# Patient Record
Sex: MALE | Race: BLACK OR AFRICAN AMERICAN | NOT HISPANIC OR LATINO | Employment: OTHER | URBAN - METROPOLITAN AREA
[De-identification: names, ages, dates, MRNs, and addresses within clinical notes are randomized per-mention and may not be internally consistent; named-entity substitution may affect disease eponyms.]

---

## 2017-01-06 ENCOUNTER — ANTI-COAG VISIT (OUTPATIENT)
Dept: CARDIOLOGY | Facility: CLINIC | Age: 62
End: 2017-01-06
Payer: COMMERCIAL

## 2017-01-06 DIAGNOSIS — I48.91 ATRIAL FIBRILLATION WITH RAPID VENTRICULAR RESPONSE: ICD-10-CM

## 2017-01-06 DIAGNOSIS — Z79.01 LONG TERM (CURRENT) USE OF ANTICOAGULANTS: ICD-10-CM

## 2017-01-06 LAB
CTP QC/QA: NORMAL
INR PPP: 2.3 (ref 2–3)

## 2017-01-06 PROCEDURE — 85610 PROTHROMBIN TIME: CPT | Mod: QW,S$GLB,,

## 2017-01-22 PROBLEM — R79.89 ELEVATED TROPONIN: Status: ACTIVE | Noted: 2017-01-22

## 2017-01-23 ENCOUNTER — OFFICE VISIT (OUTPATIENT)
Dept: INTERNAL MEDICINE | Facility: CLINIC | Age: 62
DRG: 872 | End: 2017-01-23
Payer: COMMERCIAL

## 2017-01-23 ENCOUNTER — HOSPITAL ENCOUNTER (INPATIENT)
Facility: HOSPITAL | Age: 62
LOS: 4 days | Discharge: HOME | DRG: 872 | End: 2017-01-27
Attending: EMERGENCY MEDICINE | Admitting: INTERNAL MEDICINE
Payer: COMMERCIAL

## 2017-01-23 VITALS
HEIGHT: 73 IN | WEIGHT: 250.44 LBS | DIASTOLIC BLOOD PRESSURE: 68 MMHG | HEART RATE: 98 BPM | SYSTOLIC BLOOD PRESSURE: 146 MMHG | OXYGEN SATURATION: 91 % | BODY MASS INDEX: 33.19 KG/M2

## 2017-01-23 DIAGNOSIS — I30.9 ACUTE PERICARDITIS: ICD-10-CM

## 2017-01-23 DIAGNOSIS — M54.6 THORACIC BACK PAIN, UNSPECIFIED BACK PAIN LATERALITY, UNSPECIFIED CHRONICITY: ICD-10-CM

## 2017-01-23 DIAGNOSIS — Z86.79 HISTORY OF PERICARDITIS: ICD-10-CM

## 2017-01-23 DIAGNOSIS — R06.02 SOB (SHORTNESS OF BREATH): ICD-10-CM

## 2017-01-23 DIAGNOSIS — J98.11 MILD BIBASILAR ATELECTASIS: ICD-10-CM

## 2017-01-23 DIAGNOSIS — D72.829 LEUKOCYTOSIS, UNSPECIFIED TYPE: ICD-10-CM

## 2017-01-23 DIAGNOSIS — R79.89 ELEVATED TROPONIN: ICD-10-CM

## 2017-01-23 DIAGNOSIS — R06.02 SHORTNESS OF BREATH: Primary | ICD-10-CM

## 2017-01-23 DIAGNOSIS — M89.8X9 BONE PAIN: ICD-10-CM

## 2017-01-23 DIAGNOSIS — N18.30 CKD (CHRONIC KIDNEY DISEASE) STAGE 3, GFR 30-59 ML/MIN: ICD-10-CM

## 2017-01-23 DIAGNOSIS — Z94.0 S/P KIDNEY TRANSPLANT: ICD-10-CM

## 2017-01-23 DIAGNOSIS — R07.9 CHEST PAIN, UNSPECIFIED TYPE: Primary | ICD-10-CM

## 2017-01-23 DIAGNOSIS — R07.9 CHEST PAIN: ICD-10-CM

## 2017-01-23 PROBLEM — A41.9 SEPSIS: Status: ACTIVE | Noted: 2017-01-23

## 2017-01-23 LAB
ALBUMIN SERPL BCP-MCNC: 3.4 G/DL
ALP SERPL-CCNC: 76 U/L
ALT SERPL W/O P-5'-P-CCNC: 16 U/L
ANION GAP SERPL CALC-SCNC: 12 MMOL/L
AST SERPL-CCNC: 18 U/L
BASOPHILS # BLD AUTO: 0.01 K/UL
BASOPHILS NFR BLD: 0.1 %
BILIRUB SERPL-MCNC: 0.6 MG/DL
BNP SERPL-MCNC: 126 PG/ML
BUN SERPL-MCNC: 16 MG/DL
CALCIUM SERPL-MCNC: 7.5 MG/DL
CHLORIDE SERPL-SCNC: 104 MMOL/L
CK SERPL-CCNC: 227 U/L
CO2 SERPL-SCNC: 21 MMOL/L
CREAT SERPL-MCNC: 1.8 MG/DL
CRP SERPL-MCNC: 171.4 MG/L
DIFFERENTIAL METHOD: ABNORMAL
EOSINOPHIL # BLD AUTO: 0 K/UL
EOSINOPHIL NFR BLD: 0.2 %
ERYTHROCYTE [DISTWIDTH] IN BLOOD BY AUTOMATED COUNT: 14 %
ERYTHROCYTE [SEDIMENTATION RATE] IN BLOOD BY WESTERGREN METHOD: 68 MM/HR
EST. GFR  (AFRICAN AMERICAN): 45.6 ML/MIN/1.73 M^2
EST. GFR  (NON AFRICAN AMERICAN): 39.5 ML/MIN/1.73 M^2
GLUCOSE SERPL-MCNC: 115 MG/DL
HCT VFR BLD AUTO: 38.3 %
HGB BLD-MCNC: 12.5 G/DL
INR PPP: 2.9
LYMPHOCYTES # BLD AUTO: 1.2 K/UL
LYMPHOCYTES NFR BLD: 9.3 %
MCH RBC QN AUTO: 26.4 PG
MCHC RBC AUTO-ENTMCNC: 32.6 %
MCV RBC AUTO: 81 FL
MONOCYTES # BLD AUTO: 1 K/UL
MONOCYTES NFR BLD: 7.4 %
NEUTROPHILS # BLD AUTO: 10.6 K/UL
NEUTROPHILS NFR BLD: 82.5 %
PLATELET # BLD AUTO: 159 K/UL
PMV BLD AUTO: 12 FL
POTASSIUM SERPL-SCNC: 4.2 MMOL/L
PROT SERPL-MCNC: 7.5 G/DL
PROTHROMBIN TIME: 29.3 SEC
RBC # BLD AUTO: 4.74 M/UL
SODIUM SERPL-SCNC: 137 MMOL/L
TROPONIN I SERPL DL<=0.01 NG/ML-MCNC: 0.03 NG/ML
TROPONIN I SERPL DL<=0.01 NG/ML-MCNC: 0.05 NG/ML
WBC # BLD AUTO: 12.8 K/UL

## 2017-01-23 PROCEDURE — 1159F MED LIST DOCD IN RCRD: CPT | Mod: S$GLB,,, | Performed by: INTERNAL MEDICINE

## 2017-01-23 PROCEDURE — 80053 COMPREHEN METABOLIC PANEL: CPT

## 2017-01-23 PROCEDURE — 85025 COMPLETE CBC W/AUTO DIFF WBC: CPT

## 2017-01-23 PROCEDURE — 99999 PR PBB SHADOW E&M-EST. PATIENT-LVL IV: CPT | Mod: PBBFAC,,, | Performed by: INTERNAL MEDICINE

## 2017-01-23 PROCEDURE — 87400 INFLUENZA A/B EACH AG IA: CPT | Mod: 59

## 2017-01-23 PROCEDURE — 99285 EMERGENCY DEPT VISIT HI MDM: CPT | Mod: ,,, | Performed by: EMERGENCY MEDICINE

## 2017-01-23 PROCEDURE — 63600175 PHARM REV CODE 636 W HCPCS: Performed by: STUDENT IN AN ORGANIZED HEALTH CARE EDUCATION/TRAINING PROGRAM

## 2017-01-23 PROCEDURE — 94760 N-INVAS EAR/PLS OXIMETRY 1: CPT

## 2017-01-23 PROCEDURE — 96365 THER/PROPH/DIAG IV INF INIT: CPT

## 2017-01-23 PROCEDURE — 3078F DIAST BP <80 MM HG: CPT | Mod: S$GLB,,, | Performed by: INTERNAL MEDICINE

## 2017-01-23 PROCEDURE — 99285 EMERGENCY DEPT VISIT HI MDM: CPT | Mod: 25

## 2017-01-23 PROCEDURE — 87040 BLOOD CULTURE FOR BACTERIA: CPT

## 2017-01-23 PROCEDURE — 25000003 PHARM REV CODE 250: Performed by: STUDENT IN AN ORGANIZED HEALTH CARE EDUCATION/TRAINING PROGRAM

## 2017-01-23 PROCEDURE — 96375 TX/PRO/DX INJ NEW DRUG ADDON: CPT

## 2017-01-23 PROCEDURE — 93005 ELECTROCARDIOGRAM TRACING: CPT

## 2017-01-23 PROCEDURE — 96361 HYDRATE IV INFUSION ADD-ON: CPT

## 2017-01-23 PROCEDURE — 25000242 PHARM REV CODE 250 ALT 637 W/ HCPCS: Performed by: NURSE PRACTITIONER

## 2017-01-23 PROCEDURE — 99215 OFFICE O/P EST HI 40 MIN: CPT | Mod: S$GLB,,, | Performed by: INTERNAL MEDICINE

## 2017-01-23 PROCEDURE — 12000002 HC ACUTE/MED SURGE SEMI-PRIVATE ROOM

## 2017-01-23 PROCEDURE — 82550 ASSAY OF CK (CPK): CPT

## 2017-01-23 PROCEDURE — 84484 ASSAY OF TROPONIN QUANT: CPT | Mod: 91

## 2017-01-23 PROCEDURE — 83880 ASSAY OF NATRIURETIC PEPTIDE: CPT

## 2017-01-23 PROCEDURE — 25500020 PHARM REV CODE 255: Performed by: EMERGENCY MEDICINE

## 2017-01-23 PROCEDURE — 94640 AIRWAY INHALATION TREATMENT: CPT

## 2017-01-23 PROCEDURE — 96366 THER/PROPH/DIAG IV INF ADDON: CPT | Mod: 59

## 2017-01-23 PROCEDURE — 86140 C-REACTIVE PROTEIN: CPT

## 2017-01-23 PROCEDURE — 27000221 HC OXYGEN, UP TO 24 HOURS

## 2017-01-23 PROCEDURE — 93010 ELECTROCARDIOGRAM REPORT: CPT | Mod: ,,, | Performed by: INTERNAL MEDICINE

## 2017-01-23 PROCEDURE — 25000003 PHARM REV CODE 250: Performed by: EMERGENCY MEDICINE

## 2017-01-23 PROCEDURE — 96376 TX/PRO/DX INJ SAME DRUG ADON: CPT

## 2017-01-23 PROCEDURE — 80197 ASSAY OF TACROLIMUS: CPT

## 2017-01-23 PROCEDURE — 3074F SYST BP LT 130 MM HG: CPT | Mod: S$GLB,,, | Performed by: INTERNAL MEDICINE

## 2017-01-23 PROCEDURE — 85651 RBC SED RATE NONAUTOMATED: CPT

## 2017-01-23 PROCEDURE — 85610 PROTHROMBIN TIME: CPT

## 2017-01-23 PROCEDURE — 63600175 PHARM REV CODE 636 W HCPCS: Performed by: EMERGENCY MEDICINE

## 2017-01-23 RX ORDER — PANTOPRAZOLE SODIUM 40 MG/1
40 TABLET, DELAYED RELEASE ORAL DAILY
Status: DISCONTINUED | OUTPATIENT
Start: 2017-01-24 | End: 2017-01-27 | Stop reason: HOSPADM

## 2017-01-23 RX ORDER — IBUPROFEN 200 MG
16 TABLET ORAL
Status: DISCONTINUED | OUTPATIENT
Start: 2017-01-23 | End: 2017-01-27 | Stop reason: HOSPADM

## 2017-01-23 RX ORDER — ASPIRIN 325 MG
325 TABLET ORAL ONCE
Status: COMPLETED | OUTPATIENT
Start: 2017-01-23 | End: 2017-01-23

## 2017-01-23 RX ORDER — IBUPROFEN 200 MG
24 TABLET ORAL
Status: DISCONTINUED | OUTPATIENT
Start: 2017-01-23 | End: 2017-01-27 | Stop reason: HOSPADM

## 2017-01-23 RX ORDER — PREDNISONE 5 MG/1
5 TABLET ORAL EVERY MORNING
Status: DISCONTINUED | OUTPATIENT
Start: 2017-01-24 | End: 2017-01-24

## 2017-01-23 RX ORDER — ONDANSETRON 2 MG/ML
4 INJECTION INTRAMUSCULAR; INTRAVENOUS EVERY 12 HOURS PRN
Status: DISCONTINUED | OUTPATIENT
Start: 2017-01-23 | End: 2017-01-27 | Stop reason: HOSPADM

## 2017-01-23 RX ORDER — GLUCAGON 1 MG
1 KIT INJECTION
Status: DISCONTINUED | OUTPATIENT
Start: 2017-01-23 | End: 2017-01-27 | Stop reason: HOSPADM

## 2017-01-23 RX ORDER — TACROLIMUS 1 MG/1
4 CAPSULE ORAL EVERY MORNING
Status: DISCONTINUED | OUTPATIENT
Start: 2017-01-24 | End: 2017-01-27

## 2017-01-23 RX ORDER — MOXIFLOXACIN HYDROCHLORIDE 400 MG/250ML
400 INJECTION, SOLUTION INTRAVENOUS
Status: COMPLETED | OUTPATIENT
Start: 2017-01-23 | End: 2017-01-23

## 2017-01-23 RX ORDER — RAMELTEON 8 MG/1
8 TABLET ORAL NIGHTLY PRN
Status: DISCONTINUED | OUTPATIENT
Start: 2017-01-23 | End: 2017-01-27 | Stop reason: HOSPADM

## 2017-01-23 RX ORDER — METOPROLOL TARTRATE 50 MG/1
50 TABLET ORAL 2 TIMES DAILY
Status: DISCONTINUED | OUTPATIENT
Start: 2017-01-23 | End: 2017-01-27 | Stop reason: HOSPADM

## 2017-01-23 RX ORDER — SODIUM CHLORIDE 9 MG/ML
INJECTION, SOLUTION INTRAVENOUS CONTINUOUS
Status: DISCONTINUED | OUTPATIENT
Start: 2017-01-24 | End: 2017-01-23

## 2017-01-23 RX ORDER — ONDANSETRON 8 MG/1
8 TABLET, ORALLY DISINTEGRATING ORAL EVERY 8 HOURS PRN
Status: DISCONTINUED | OUTPATIENT
Start: 2017-01-23 | End: 2017-01-27 | Stop reason: HOSPADM

## 2017-01-23 RX ORDER — MORPHINE SULFATE 2 MG/ML
8 INJECTION, SOLUTION INTRAMUSCULAR; INTRAVENOUS
Status: COMPLETED | OUTPATIENT
Start: 2017-01-23 | End: 2017-01-23

## 2017-01-23 RX ORDER — IBUPROFEN 200 MG
1 TABLET ORAL DAILY
Status: DISCONTINUED | OUTPATIENT
Start: 2017-01-24 | End: 2017-01-27 | Stop reason: HOSPADM

## 2017-01-23 RX ORDER — SODIUM CHLORIDE, SODIUM LACTATE, POTASSIUM CHLORIDE, CALCIUM CHLORIDE 600; 310; 30; 20 MG/100ML; MG/100ML; MG/100ML; MG/100ML
INJECTION, SOLUTION INTRAVENOUS CONTINUOUS
Status: DISCONTINUED | OUTPATIENT
Start: 2017-01-23 | End: 2017-01-24

## 2017-01-23 RX ORDER — IPRATROPIUM BROMIDE AND ALBUTEROL SULFATE 2.5; .5 MG/3ML; MG/3ML
3 SOLUTION RESPIRATORY (INHALATION) ONCE
Status: COMPLETED | OUTPATIENT
Start: 2017-01-23 | End: 2017-01-23

## 2017-01-23 RX ORDER — MYCOPHENOLATE MOFETIL 250 MG/1
250 CAPSULE ORAL 2 TIMES DAILY
Status: DISCONTINUED | OUTPATIENT
Start: 2017-01-23 | End: 2017-01-23

## 2017-01-23 RX ORDER — TACROLIMUS 1 MG/1
3 CAPSULE ORAL EVERY EVENING
Status: DISCONTINUED | OUTPATIENT
Start: 2017-01-23 | End: 2017-01-24

## 2017-01-23 RX ORDER — NIFEDIPINE 30 MG/1
60 TABLET, EXTENDED RELEASE ORAL DAILY
Status: DISCONTINUED | OUTPATIENT
Start: 2017-01-24 | End: 2017-01-27 | Stop reason: HOSPADM

## 2017-01-23 RX ORDER — FUROSEMIDE 20 MG/1
20 TABLET ORAL 2 TIMES DAILY
Status: DISCONTINUED | OUTPATIENT
Start: 2017-01-23 | End: 2017-01-23

## 2017-01-23 RX ORDER — WARFARIN SODIUM 5 MG/1
5 TABLET ORAL DAILY
Status: DISCONTINUED | OUTPATIENT
Start: 2017-01-24 | End: 2017-01-24

## 2017-01-23 RX ORDER — MORPHINE SULFATE 2 MG/ML
4 INJECTION, SOLUTION INTRAMUSCULAR; INTRAVENOUS EVERY 4 HOURS PRN
Status: DISCONTINUED | OUTPATIENT
Start: 2017-01-23 | End: 2017-01-24

## 2017-01-23 RX ORDER — IPRATROPIUM BROMIDE AND ALBUTEROL SULFATE 2.5; .5 MG/3ML; MG/3ML
3 SOLUTION RESPIRATORY (INHALATION) EVERY 4 HOURS PRN
Status: DISCONTINUED | OUTPATIENT
Start: 2017-01-24 | End: 2017-01-27 | Stop reason: HOSPADM

## 2017-01-23 RX ORDER — SPIRONOLACTONE 25 MG/1
25 TABLET ORAL DAILY
Status: DISCONTINUED | OUTPATIENT
Start: 2017-01-24 | End: 2017-01-24

## 2017-01-23 RX ORDER — MORPHINE SULFATE 2 MG/ML
4 INJECTION, SOLUTION INTRAMUSCULAR; INTRAVENOUS
Status: COMPLETED | OUTPATIENT
Start: 2017-01-23 | End: 2017-01-23

## 2017-01-23 RX ORDER — ATORVASTATIN CALCIUM 10 MG/1
10 TABLET, FILM COATED ORAL DAILY
Status: DISCONTINUED | OUTPATIENT
Start: 2017-01-24 | End: 2017-01-24

## 2017-01-23 RX ADMIN — MORPHINE SULFATE 8 MG: 2 INJECTION, SOLUTION INTRAMUSCULAR; INTRAVENOUS at 05:01

## 2017-01-23 RX ADMIN — TACROLIMUS 3 MG: 1 CAPSULE, GELATIN COATED ORAL at 11:01

## 2017-01-23 RX ADMIN — IOHEXOL 100 ML: 350 INJECTION, SOLUTION INTRAVENOUS at 05:01

## 2017-01-23 RX ADMIN — MYCOPHENOLATE MOFETIL 250 MG: 250 CAPSULE ORAL at 10:01

## 2017-01-23 RX ADMIN — ASPIRIN 325 MG ORAL TABLET 325 MG: 325 PILL ORAL at 11:01

## 2017-01-23 RX ADMIN — MORPHINE SULFATE 4 MG: 2 INJECTION, SOLUTION INTRAMUSCULAR; INTRAVENOUS at 03:01

## 2017-01-23 RX ADMIN — SODIUM CHLORIDE, SODIUM LACTATE, POTASSIUM CHLORIDE, AND CALCIUM CHLORIDE 1000 ML: .6; .31; .03; .02 INJECTION, SOLUTION INTRAVENOUS at 05:01

## 2017-01-23 RX ADMIN — METOPROLOL TARTRATE 50 MG: 50 TABLET, FILM COATED ORAL at 08:01

## 2017-01-23 RX ADMIN — SODIUM CHLORIDE, SODIUM LACTATE, POTASSIUM CHLORIDE, AND CALCIUM CHLORIDE: .6; .31; .03; .02 INJECTION, SOLUTION INTRAVENOUS at 08:01

## 2017-01-23 RX ADMIN — IPRATROPIUM BROMIDE AND ALBUTEROL SULFATE 3 ML: .5; 3 SOLUTION RESPIRATORY (INHALATION) at 09:01

## 2017-01-23 RX ADMIN — FUROSEMIDE 20 MG: 20 TABLET ORAL at 08:01

## 2017-01-23 RX ADMIN — MOXIFLOXACIN HYDROCHLORIDE 400 MG: 400 INJECTION, SOLUTION INTRAVENOUS at 08:01

## 2017-01-23 NOTE — IP AVS SNAPSHOT
[facility]  [address]  [facility] [address]  Phone: [phone]           Patient Discharge Instructions     Our goal is to set you up for success. This packet includes information on your condition, medications, and your home care. It will help you to care for yourself so you don't get sicker and need to go back to the hospital.     Please ask your nurse if you have any questions.        There are many details to remember when preparing to leave the hospital. Here is what you will need to do:    1. Take your medicine. If you are prescribed medications, review your Medication List in the following pages. You may have new medications to  at the pharmacy and others that you'll need to stop taking. Review the instructions for how and when to take your medications. Talk with your doctor or nurses if you are unsure of what to do.     2. Go to your follow-up appointments. Specific follow-up information is listed in the following pages. Your may be contacted by a transition nurse or clinical provider about future appointments. Be sure we have all of the phone numbers to reach you, if needed. Please contact your provider's office if you are unable to make an appointment.     3. Watch for warning signs. Your doctor or nurse will give you detailed warning signs to watch for and when to call for assistance. These instructions may also include educational information about your condition. If you experience any of warning signs to your health, call your doctor.               [facility]  Unless otherwise directed by your provider, please contact [facility], our nurse care line that is available for 24/7 assistance.     [phone] (toll-free)    Registered nurses in the [facility] provide clinical advisement, health education, appointment booking, and other advisory services.                    ** Verify the list of medication(s) below is accurate and up  to date. Carry this with you in case of emergency. If your medications have changed, please notify your healthcare provider.             Medication List      START taking these medications        Additional Info    Begin Date AM Noon PM Bedtime    colchicine 0.6 mg tablet   Quantity:  60 tablet   Refills:  2   Dose:  0.6 mg    Last time this was given:  0.6 mg on 1/26/2017  8:50 AM   Instructions:  Take 1 tablet (0.6 mg total) by mouth 2 (two) times daily.                                    CHANGE how you take these medications        Additional Info    Begin Date AM Noon PM Bedtime    atorvastatin 40 MG tablet   Commonly known as:  LIPITOR   Quantity:  90 tablet   Refills:  3   Dose:  40 mg   What changed:    - medication strength  - how much to take    Last time this was given:  40 mg on 1/27/2017  9:01 AM   Instructions:  Take 1 tablet (40 mg total) by mouth once daily.                               * predniSONE 5 MG tablet   Commonly known as:  DELTASONE   Quantity:  90 tablet   Refills:  3   What changed:  Another medication with the same name was added. Make sure you understand how and when to take each.    Last time this was given:  50 mg on 1/27/2017  9:02 AM   Instructions:  TAKE 1 TABLET BY MOUTH EVERY MORNING                               * predniSONE 10 MG tablet   Commonly known as:  DELTASONE   Quantity:  40 tablet   Refills:  0   Dose:  10 mg   What changed:  You were already taking a medication with the same name, and this prescription was added. Make sure you understand how and when to take each.    Last time this was given:  50 mg on 1/27/2017  9:02 AM   Instructions:  Take 1 tablet (10 mg total) by mouth once daily. Please take 4 Pills starting 1/28 to 1/31 Please take 3 Pills starting 2/1 to 2/4 Please take 2 Pills starting 2/5 to 2/8 Please take 1 Pill starting 2/9 to 2/12 Please continue home Prednisone 5 mg PO daily                            tacrolimus 1 MG Cap   Commonly known as:  PROGRAF    Quantity:  240 capsule   Refills:  2   Dose:  4 mg   What changed:    - how much to take  - how to take this  - when to take this  - additional instructions    Last time this was given:  4 mg on 1/27/2017  9:01 AM   Instructions:  Take 4 capsules (4 mg total) by mouth every 12 (twelve) hours.                                  * Notice:  This list has 2 medication(s) that are the same as other medications prescribed for you. Read the directions carefully, and ask your doctor or other care provider to review them with you.      CONTINUE taking these medications        Additional Info    Begin Date AM Noon PM Bedtime    ADULT ASPIRIN EC LOW STRENGTH 81 MG EC tablet   Refills:  0   Dose:  1 tablet   Generic drug:  aspirin    Instructions:  Take 1 tablet by mouth Daily.                               ALLEGRA 60 MG tablet   Refills:  0   Dose:  1 tablet   Generic drug:  fexofenadine    Instructions:  Take 1 tablet by mouth Twice daily as needed.                            cyclobenzaprine 5 MG tablet   Commonly known as:  FLEXERIL   Quantity:  30 tablet   Refills:  0   Dose:  5 mg    Instructions:  Take 1 tablet (5 mg total) by mouth 3 (three) times daily as needed for Muscle spasms.                            doxazosin 4 MG tablet   Commonly known as:  CARDURA   Refills:  0   Dose:  6 mg    Last time this was given:  6 mg on 1/27/2017  9:01 AM   Instructions:  Take 6 mg by mouth every 12 (twelve) hours.                                  fluticasone 50 mcg/actuation nasal spray   Commonly known as:  FLONASE   Quantity:  16 g   Refills:  6   Dose:  1 spray    Instructions:  1 spray by Each Nare route once daily.                               furosemide 40 MG tablet   Commonly known as:  LASIX   Quantity:  180 tablet   Refills:  2   Dose:  20 mg    Last time this was given:  20 mg on 1/23/2017  8:24 PM   Instructions:  Take 0.5 tablets (20 mg total) by mouth 2 (two) times daily.                                  metoprolol  tartrate 50 MG tablet   Commonly known as:  LOPRESSOR   Quantity:  180 tablet   Refills:  3   Dose:  50 mg    Last time this was given:  50 mg on 1/27/2017  9:02 AM   Instructions:  Take 1 tablet (50 mg total) by mouth 2 (two) times daily.                                  multivitamin per tablet   Commonly known as:  THERAGRAN   Refills:  0   Dose:  1 tablet    Instructions:  Take 1 tablet by mouth Daily.                               mycophenolate 250 mg Cap   Commonly known as:  CELLCEPT   Quantity:  360 capsule   Refills:  3    Last time this was given:  500 mg on 1/27/2017  9:01 AM   Instructions:  TAKE 2 CAPSULES BY MOUTH EVERY 12 HOURS TO PREVENT KIDNEY TRANSPLANT REJECTION                                  nifedipine 60 MG Tbsr   Commonly known as:  ADALAT CC   Quantity:  180 tablet   Refills:  1    Instructions:  TAKE 1 TABLET BY MOUTH TWICE DAILY.                               NTS STEP 1 21 mg/24 hr   Quantity:  28 patch   Refills:  6   Generic drug:  nicotine    Instructions:  PLACE 1 PATCH ONTO THE SKIN ONCE DAILY.                               pantoprazole 40 MG tablet   Commonly known as:  PROTONIX   Quantity:  90 tablet   Refills:  3    Last time this was given:  40 mg on 1/27/2017  9:01 AM   Instructions:  TAKE 1 TABLET BY MOUTH DAILY                               paricalcitol 1 MCG capsule   Commonly known as:  ZEMPLAR   Quantity:  90 capsule   Refills:  3    Last time this was given:  1 mcg on 1/27/2017  3:27 PM   Instructions:  One po MWF                            potassium chloride SA 20 MEQ tablet   Commonly known as:  K-DUR,KLOR-CON   Quantity:  90 tablet   Refills:  3    Instructions:  TAKE 1 TABLET BY MOUTH DAILY                               spironolactone 25 MG tablet   Commonly known as:  ALDACTONE   Quantity:  90 tablet   Refills:  3   Dose:  25 mg    Instructions:  Take 1 tablet (25 mg total) by mouth once daily.                               TUMS 200 mg calcium (500 mg) chewable tablet    Refills:  0   Dose:  4 tablet   Generic drug:  calcium carbonate    Instructions:  Take 4 tablets by mouth once daily.                               VITAMIN D-3 ORAL   Refills:  0   Dose:  1000 Units    Instructions:  Take 1,000 Units by mouth 2 (two) times daily.                                  warfarin 5 MG tablet   Commonly known as:  COUMADIN   Quantity:  40 tablet   Refills:  5   Comments:    - Refill request was faxed to [facility] pharmacy ph. # [phone], Fax # [phone]    -     - Dr. Shagufta Blunt    Last time this was given:  5 mg on 1/26/2017  5:41 PM   Instructions:  Take 1 tablet (5mg.) by mouth daily, except 1.5 (7.5mg) on Wednesday,  Or as directed by Coumadin Clinic                                 STOP taking these medications     hydrocodone-acetaminophen 5-325mg 5-325 mg per tablet   Commonly known as:  NORCO            Where to Get Your Medications      These medications were sent to [facility]/pharmacy #[id number] - [place] - [address] [facility]  [address] [facility] [address]     Phone:  [phone]     atorvastatin 40 MG tablet    colchicine 0.6 mg tablet    tacrolimus 1 MG Cap         You can get these medications from any pharmacy     Bring a paper prescription for each of these medications     predniSONE 10 MG tablet                  Please bring to all follow up appointments:    1. A copy of your discharge instructions.  2. All medicines you are currently taking in their original bottles.  3. Identification and insurance card.    Please arrive 15 minutes ahead of scheduled appointment time.    Please call 24 hours in advance if you must reschedule your appointment and/or time.        Your Scheduled Appointments     Feb 03, 2017  8:20 AM CST   Established Patient Visit with MD Rochelle Meyers - Nephrology ([facility]    [address] [facility] [address] [place] [address]   [phone]            Feb 10, 2017  8:00 AM CST   Anticoagulation with Jeffery Corbin, PharmD   [facility]  - Coumadin ([address] )    [address]  [facility] [address]   [phone]            Mar 03, 2017  8:00 AM CST   Physical with Shagufta Blunt MD   [facility] - Cardiology ([facility] )    [address]  [facility] [id number] [phone]              Follow-up Information     Follow up with [facility]- Transplant In 1 week.    Specialty:  Transplant    Why:  S/P kidney transplant 2005, increase in Prograf on discharge     Contact information:    [address]  [facility] [id number] [phone]    Additional information:    [facility] - 1st Floor, Located by clinic tower elevators        Follow up with Scott Miranda Jr, MD In 2 weeks.    Specialty:  Internal Medicine    Why:  Post Hospital Stay     Contact information:    [address]  [facility] [address]  [phone]          Follow up with Shagufta Blunt MD On 3/3/2017.    Specialty:  Cardiology    Why:  8:00am    Contact information:    [address]  [facility] [id number] [phone]          Discharge Instructions     Future Orders    Activity as tolerated     Call MD for:  severe uncontrolled pain     Call MD for:  temperature >100.4     Diet general     Questions:    Total calories:      Fat restriction, if any:      Protein restriction, if any:      Na restriction, if any:      Fluid restriction:      Additional restrictions:          Discharge Instructions       You were admitted to [facility] for Pericarditis.  From the Cardiology team, you are instructed to take the Colchicine 0.6 mg orally twice a day for up to 3 months.  In addition, we have a steroid taper, where your dose of prednisone will decrease to 40 for 4 days, then 30 for 4 days, then 20 for 4 days, then 10 for 4 days, then back to your original 5.  We will prescribe enough Prednisone for the taper with instructions on the bottle.  After you finish the medicines, you can continue to your  "original prednisone 5 daily.  You will also follow up with Cardiology in 4 weeks time.  In addition, we have increased your tacrolimus to 4 mg twice a day, and your Cellcept will remain the same.  You will follow up with them in 1 week to assess your levels.  Finally, we would like you to follow up with your primary care provider in 2 weeks to let him know you were in the hospital.  Thank you for choosing [facility] for your health care.  If you have any questions, please do not hesitate to call the on-call number listed above.        Primary Diagnosis     Your primary diagnosis was:  Inflammation Of The Covering Of The Heart      Admission Information     Date & Time Provider Department CSN    1/23/2017  2:48 PM Christina Campbell MD [facility] [id number]      Care Providers     Provider Role Specialty Primary office phone    Christina Campbell MD Attending Provider Hospitalist [phone]    Christina Campbell MD Team Attending  Hospitalist [phone]    Merlene Colin MD Consulting Physician  Nephrology [phone]    Nelson Webb MD Consulting Physician  Nephrology [phone]    Layne Chairez MD Consulting Physician  Transplant [phone]      Your Vitals Were     BP Pulse Temp Resp Height Weight    148/80 (BP Location: Left arm, Patient Position: Lying, BP Method: Automatic) 65 98.1 °F (36.7 °C) (Oral) 18 6' 1" (1.854 m) 117.6 kg (259 lb 4.2 oz)    SpO2 BMI             100% 34.21 kg/m2         Recent Lab Values        5/2/2006 4/20/2011 9/27/2014 4/30/2015 7/6/2015 2/25/2016 10/7/2016         9:45 AM  8:58 AM  8:20 AM 12:26 AM  8:39 AM 10:58 AM  7:44 AM     A1C 5.6 6.0 6.1 6.1 5.9 5.9 6.2     Comment for A1C at  7:44 AM on 10/7/2016:  According to ADA guidelines, hemoglobin A1C <7.0% represents  optimal control in non-pregnant diabetic patients.  Different  metrics may apply to specific populations.   Standards of Medical Care in Diabetes - 2016.  For the " purpose of screening for the presence of diabetes:  <5.7%     Consistent with the absence of diabetes  5.7-6.4%  Consistent with increasing risk for diabetes   (prediabetes)  >or=6.5%  Consistent with diabetes  Currently no consensus exists for use of hemoglobin A1C  for diagnosis of diabetes for children.        Pending Labs     Order Current Status    Protein electrophoresis, urine In process    Blood Culture #1 **CANNOT BE ORDERED STAT** Preliminary result    Blood Culture #2 **CANNOT BE ORDERED STAT** Preliminary result    Blood culture Preliminary result    Blood culture Preliminary result      Allergies as of 1/27/2017        Reactions    Ace Inhibitors     Other reaction(s): Swelling    Povidone-iodine     Other reaction(s): Itching  Other reaction(s): Itching    Verapamil Other (See Comments)    Other reaction(s): Unknown      Advance Directives     An advance directive is a document which, in the event you are no longer able to make decisions for yourself, tells your healthcare team what kind of treatment you do or do not want to receive, or who you would like to make those decisions for you.  If you do not currently have an advance directive, [facility] encourages you to create one.  For more information call:  [phone] ([phone]), [phone] ([phone]),  or log on to [email or web address].        Smoking Cessation     If you would like to quit smoking:   You may be eligible for free services if you are a [place] resident and started smoking cigarettes before September 1, 1988.  Call the Smoking Cessation Trust ([facility]) toll free at ([phone]) [phone] or [phone] [phone].   Call 4-950-QUIT-NOW if you do not meet the above criteria.            Language Assistance Services     ATTENTION: Language assistance services are available, free of charge. Please call [phone].      ATENCIÓN: Si habla español, tiene a chicas disposición servicios gratuitos de asistencia lingüística. Llame al  [phone].     MARLEY Ý: N?u b?n nói Ti?ng Vi?t, có các d?ch v? h? tr? ngôn ng? mi?n phí dành cho b?n. G?i s? [phone].        Coumadin Discharge Instructions                         Chronic Kindey Disease Education              [facility] complies with applicable Federal civil rights laws and does not discriminate on the basis of race, color, national origin, age, disability, or sex.

## 2017-01-23 NOTE — ED TRIAGE NOTES
Patient referred from PCP for increased SOB and chest pain. Reports being seen here for same but discharged with muscloskeletal assumption. Presents today with increased SOB, worse with deep inhale and lying flat. Pain mainly between shoulder blades, favorably to right, sharp and constant but worse at times

## 2017-01-23 NOTE — PROGRESS NOTES
Subjective:       Patient ID: Ibrahima Phelps is a 61 y.o. male.    Chief Complaint: Chest Pain and Back Pain    HPI   Patient is here after ER visits on Saturday and Sunday am for the same symptoms he comes in her today. He is very uncomfortable and with a severe back pain. This is all new in this Transplant patient. We did note the Wbc count was elevated compared to previous , the troponin slightly elevated and his CXR has bibasilar atelectasis. An old CXR had rib abnormalities? ( may need SPEP)  Review of Systems    Shortness of Breath  Severe Back Pain  Had to keep moving  No pain to touch  Objective:      Physical Exam   Constitutional: He is oriented to person, place, and time. He appears well-developed and well-nourished. He appears distressed.   Neck: Carotid bruit is not present. No thyromegaly present.   Cardiovascular: Normal rate, regular rhythm and normal heart sounds.  PMI is not displaced.    Pulmonary/Chest: Breath sounds normal. He is in respiratory distress.   [place] by his wife earlier and his wife gave him some respiratory treatments at home   Abdominal: Soft. Bowel sounds are normal. He exhibits no distension. There is no tenderness.   Musculoskeletal: He exhibits no edema.   Could not palpate a pain source and to the patient it felt deep   Neurological: He is alert and oriented to person, place, and time.       Assessment:       1. Shortness of breath    2. Bone pain    3. History of pericarditis    4. Elevated troponin    5. Leukocytosis, unspecified type    6. Thoracic back pain, unspecified back pain laterality, unspecified chronicity    7. Mild bibasilar atelectasis        Plan:     Would rule out PE, aneurysm, pneumonia and get a CT to confirm this is ruled out. May need a 2 D Echo for history of pericarditis. He should also have a SPEP with repeat lab. Because his pain has not been controlled if all is negative I would suspect at least OBS is needed.    Called ER, transported on 2 liters of  oxygen  Ibrahima was seen today for chest pain and back pain.    Diagnoses and all orders for this visit:    Shortness of breath  -     Refer to Emergency Dept.    Bone pain  -     Refer to Emergency Dept.    History of pericarditis  -     Refer to Emergency Dept.    Elevated troponin  -     Refer to Emergency Dept.    Leukocytosis, unspecified type  -     Refer to Emergency Dept.    Thoracic back pain, unspecified back pain laterality, unspecified chronicity  -     Refer to Emergency Dept.    Mild bibasilar atelectasis  -     Refer to Emergency Dept.      Will set up follow up    New Prescriptions    No medications on file       Modified Medications    No medications on file       Orders Placed This Encounter   Procedures    Refer to Emergency Dept.       Labs, studies and consults associated with this visit were reviewed

## 2017-01-23 NOTE — ED PROVIDER NOTES
"Encounter Date: 1/23/2017    SCRIBE #1 NOTE: I, Feli Stephens, am scribing for, and in the presence of, Dr. Richard.       History     Chief Complaint   Patient presents with    Chest Pain     seen here sat and sunday for same thing, sent home, sent here from IM clinic per dr Keith to rule out PE     Review of patient's allergies indicates:   Allergen Reactions    Ace inhibitors      Other reaction(s): Swelling    Povidone-iodine      Other reaction(s): Itching  Other reaction(s): Itching    Verapamil Other (See Comments)     Other reaction(s): Unknown     HPI Comments: Time seen by provider: 2:59 PM    This is a 61 y.o. male with history of HTN, HLD, CAD, pericarditis, A-fib, thyroid disease, s/p kidney transplant, coronary angioplasty with stent, cardioversion who presents with complaint of chest pain with associated SOB. The symptoms began two mornings ago waking him up from sleep. He describes soreness across the anterior chest and a "stabbing" pain between the shoulder blades. The pain waxes and wanes in intensity, but is constantly present. The pain worsens with movement and a deep breath. He has tried breath treatments at home without relief in pain or SOB. The pain he experienced with pericarditis was different in quality and more severe than this pain. He has not had pain like this in the past. He reports one episode of nausea today during the EKG and recent constipation. The patient was seen here the past two days for the same complaint. After the first visit, his symptoms were thought to be musculoskeletal pain. He was discharged with Flexeril, which didn't help much. Yesterday, he was seen again with a mildly elevated troponin at 0.034, which was negative at first visit, but cardiology felt he could be discharged. Today, pt followed up with his PCP, who was concerned about the patient's pain and SOB and referred him back to the emergency department. He denies fever, vomiting, abdominal pain, changes " in urinary habits, leg swelling, or leg cramps. He denies any recent complications from his kidney transplant in 2005. He sees cardiology for A-fib and coronary artery stents. He is on warfarin. He denies history of asthma, COPD. He is a current 0.5 pack/day smoker. He is allergic to betadine and ace inhibitors. He has not had a problem with IV contrast in th past. He has history of pre-diabetes, but is not taking any medication for this.    The history is provided by the patient.     Past Medical History   Diagnosis Date    Atrial fibrillation     CAD (coronary artery disease)     Diverticulosis     Hyperlipidemia     Hypertension     Immunodeficiency due to treatment with immunosuppressive medication     Metabolic bone disease     Pericarditis     S/P kidney transplant     Thrombocytopenia     Thyroid disease     Unspecified disorder of kidney and ureter      Stage IIICKD     No past medical history pertinent negatives.  Past Surgical History   Procedure Laterality Date    Cholecystectomy      Coronary angioplasty with stent placement  9/13/2006    Colonoscopy  April 20, 2011     Diverticulosis, repeat recommended in 3 yrs.    Cardioversion  04/30/15    Kidney transplant  2005    Parathyroidectomy       Family History   Problem Relation Age of Onset    No Known Problems Sister     No Known Problems Brother     Thyroid disease Mother      s/p surgery    Heart disease Father      had pacemaker    Heart disease Daughter      enlarged heart    No Known Problems Sister     Kidney failure Sister     Kidney disease Sister     No Known Problems Sister     Kidney disease Brother     Kidney failure Brother      s/p transplant    No Known Problems Brother     Diabetes Mellitus Neg Hx     Stroke Neg Hx     Heart attack Neg Hx     Cancer Neg Hx     Celiac disease Neg Hx     Cirrhosis Neg Hx     Colon cancer Neg Hx     Esophageal cancer Neg Hx     Inflammatory bowel disease Neg Hx      Rectal cancer Neg Hx     Stomach cancer Neg Hx     Ulcerative colitis Neg Hx     Liver disease Neg Hx     Liver cancer Neg Hx     Crohn's disease Neg Hx      Social History   Substance Use Topics    Smoking status: Current Every Day Smoker     Packs/day: 0.50     Years: 40.00     Types: Cigarettes    Smokeless tobacco: Never Used      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    Alcohol use No     Review of Systems   Constitutional: Negative for fever.   HENT: Negative for trouble swallowing.    Respiratory: Positive for shortness of breath. Negative for cough.    Cardiovascular: Positive for chest pain. Negative for leg swelling.   Gastrointestinal: Positive for constipation and nausea (one episode). Negative for abdominal pain and vomiting.   Genitourinary: Negative for decreased urine volume and difficulty urinating.   Musculoskeletal: Positive for back pain.        Negative for leg cramps.   Skin: Negative for rash.   Neurological: Negative for syncope.   Hematological: Bruises/bleeds easily.       Physical Exam   Initial Vitals   BP Pulse Resp Temp SpO2   01/23/17 1440 01/23/17 1440 01/23/17 1440 01/23/17 1440 01/23/17 1440   139/75 90 22 99.7 °F (37.6 °C) 94 %     Physical Exam    Nursing note and vitals reviewed.  Constitutional: He appears well-developed and well-nourished. He appears distressed (moderate).   Pt appears uncomfortable in respiratory distress.   HENT:   Head: Normocephalic and atraumatic.   Right Ear: External ear normal.   Left Ear: External ear normal.   Nose: Nose normal.   Mouth/Throat: Oropharynx is clear and moist.   Eyes: EOM are normal. Pupils are equal, round, and reactive to light.   Neck: Normal range of motion. Neck supple. No tracheal deviation present. No JVD present.   Cardiovascular: Normal rate. An irregular rhythm present.   No murmur heard.  Pulses:       Radial pulses are 2+ on the right side, and 2+ on the left side.    Pulmonary/Chest: No stridor. He is in respiratory distress. He has wheezes. He has rales.   Inspiratory and expiratory rales at both bases  Faint wheezes anteriorly on the left   Abdominal: Soft. He exhibits no distension. There is no tenderness.   Musculoskeletal: Normal range of motion. He exhibits no edema.   No calf tenderness   Neurological: He is alert and oriented to person, place, and time. He has normal strength. No cranial nerve deficit or sensory deficit.   Skin: Skin is warm and dry.   Psychiatric: His behavior is normal. Thought content normal.         ED Course   Procedures  Labs Reviewed   CBC W/ AUTO DIFFERENTIAL - Abnormal; Notable for the following:        Result Value    WBC 12.80 (*)     Hemoglobin 12.5 (*)     Hematocrit 38.3 (*)     MCV 81 (*)     MCH 26.4 (*)     Gran # 10.6 (*)     Gran% 82.5 (*)     Lymph% 9.3 (*)     All other components within normal limits   COMPREHENSIVE METABOLIC PANEL - Abnormal; Notable for the following:     CO2 21 (*)     Glucose 115 (*)     Creatinine 1.8 (*)     Calcium 7.5 (*)     Albumin 3.4 (*)     eGFR if  45.6 (*)     eGFR if non  39.5 (*)     All other components within normal limits   PROTIME-INR - Abnormal; Notable for the following:     Prothrombin Time 29.3 (*)     INR 2.9 (*)     All other components within normal limits   B-TYPE NATRIURETIC PEPTIDE - Abnormal; Notable for the following:      (*)     All other components within normal limits   CULTURE, BLOOD   CULTURE, BLOOD   TROPONIN I    Narrative:     PLEASE REVIEW ORDER START TIME BEFORE MARKING SPECIMEN  COLLECTED.   CK   SEDIMENTATION RATE, MANUAL   C-REACTIVE PROTEIN   TROPONIN I    Narrative:     PLEASE REVIEW ORDER START TIME BEFORE MARKING SPECIMEN  COLLECTED.   SEDIMENTATION RATE, MANUAL    Narrative:     add on sed rate per dr valentine order #[id number]  01/23/2017  18:28      EKG Readings: (Independently Interpreted)   Sinus at 92 with frequent  premature supraventricular complexes. He has T-wave inversions in III, AVF, V5, V6. Criteria for LVH. There is a Q-wave in lead III.          Medical Decision Making:   History:   Old Medical Records: I decided to obtain old medical records.  Old Records Summarized: other records.       <> Summary of Records: Here was seen in here in the emergency department on January 21. At that time, felt to have musculoskeletal pain. Discharged with Flexeril, which didn't help much. Seen in the emergency department yesterday and that time had mildly elevated troponin at 0.034. At first visit, it was negative. Cardiology felt pt could be discharged. Today, pt followed up with his PCP, who was concerned about the patient's pain and SOB and referred him back to the emergency department.  Initial Assessment:   61-year-old man with back pain, chest pain, SOB. Differential diagnosis includes: PE, pneumonia, aortic dissection, ACS. Will check a CT scan, PE protocol, give morphine for pain control. Will check a troponin. I did discuss his EKG with cardiology, as it did show some changes from previous, however, the Q-wave and inverted T-wave in lead III could also be worrisome for a PE.  Independently Interpreted Test(s):   I have ordered and independently interpreted EKG Reading(s) - see prior notes  Clinical Tests:   Lab Tests: Ordered and Reviewed  Radiological Study: Ordered and Reviewed  Medical Tests: Ordered and Reviewed  Other:   I have discussed this case with another health care provider.            Scribe Attestation:   Scribe #1: I performed the above scribed service and the documentation accurately describes the services I performed. I attest to the accuracy of the note.    Attending Attestation:           Physician Attestation for Scribe:  Physician Attestation Statement for Scribe #1: I, Dr. Richard, reviewed documentation, as scribed by Feli Stephens in my presence, and it is both accurate and complete.         Attending  ED Notes:   3:56 PM  Patient has a betadine allergy listed. I was contacted by radiology to question whether he was allergic to IV contrast. Pt denies having allergy to contrast. States that he believes that he has had CT scans with contrast in the past without any issue.    6:33 PM  CT scan, interpreted by radiology, and independently reviewed by me, showed no evidence of PE.  There is some bandlike atelectasis at both bases but no definite infiltrate.  Patient continues to have significant pleuritic chest pain requiring IV morphine for pain control.  The etiology of his chest pain and shortness of breath is unclear.  I discussed the case with Dr. Toro, hospitalist.  He suggested adding on a CPK, ESR, CRP.  I have done this.  We will treat with Avelox for the possibility of pulmonary infection given his elevated white count with a left shift and now a low-grade fever as well as his hypoxia.  Pulse ox has been as low as 91% here.  Blood cultures were sent.  I have advised the patient and his wife of the plan.  Troponin today is negative.  Pericarditis and pleuritis are possibility.          ED Course     Clinical Impression:   The encounter diagnosis was Chest pain, unspecified type.          Blanca Sutton MD  01/23/17 0257

## 2017-01-24 PROBLEM — I30.9 ACUTE PERICARDITIS: Status: ACTIVE | Noted: 2017-01-23

## 2017-01-24 LAB
ALBUMIN SERPL BCP-MCNC: 2.7 G/DL
ANION GAP SERPL CALC-SCNC: 11 MMOL/L
ANION GAP SERPL CALC-SCNC: 9 MMOL/L
ANISOCYTOSIS BLD QL SMEAR: SLIGHT
AORTIC VALVE REGURGITATION: ABNORMAL
APTT BLDCRRT: 33.3 SEC
BACTERIA #/AREA URNS AUTO: NORMAL /HPF
BASOPHILS # BLD AUTO: 0.01 K/UL
BASOPHILS NFR BLD: 0.1 %
BILIRUB UR QL STRIP: NEGATIVE
BUN SERPL-MCNC: 13 MG/DL
BUN SERPL-MCNC: 16 MG/DL
BURR CELLS BLD QL SMEAR: ABNORMAL
CALCIUM SERPL-MCNC: 6.6 MG/DL
CALCIUM SERPL-MCNC: 7 MG/DL
CHLORIDE SERPL-SCNC: 103 MMOL/L
CHLORIDE SERPL-SCNC: 104 MMOL/L
CLARITY UR REFRACT.AUTO: CLEAR
CO2 SERPL-SCNC: 16 MMOL/L
CO2 SERPL-SCNC: 22 MMOL/L
COLOR UR AUTO: YELLOW
CREAT SERPL-MCNC: 1.2 MG/DL
CREAT SERPL-MCNC: 1.9 MG/DL
CREAT UR-MCNC: 200 MG/DL
DIASTOLIC DYSFUNCTION: YES
DIFFERENTIAL METHOD: ABNORMAL
EOSINOPHIL # BLD AUTO: 0 K/UL
EOSINOPHIL NFR BLD: 0.2 %
ERYTHROCYTE [DISTWIDTH] IN BLOOD BY AUTOMATED COUNT: 14.2 %
ERYTHROCYTE [DISTWIDTH] IN BLOOD BY AUTOMATED COUNT: 14.7 %
EST. GFR  (AFRICAN AMERICAN): 42.7 ML/MIN/1.73 M^2
EST. GFR  (AFRICAN AMERICAN): >60 ML/MIN/1.73 M^2
EST. GFR  (NON AFRICAN AMERICAN): 37 ML/MIN/1.73 M^2
EST. GFR  (NON AFRICAN AMERICAN): >60 ML/MIN/1.73 M^2
ESTIMATED PA SYSTOLIC PRESSURE: 29
FACT X PPP CHRO-ACNC: 0.26 IU/ML
FLUAV AG SPEC QL IA: NEGATIVE
FLUBV AG SPEC QL IA: NEGATIVE
GLUCOSE SERPL-MCNC: 105 MG/DL
GLUCOSE SERPL-MCNC: 79 MG/DL
GLUCOSE UR QL STRIP: NEGATIVE
HCT VFR BLD AUTO: 29.8 %
HCT VFR BLD AUTO: 34.7 %
HGB BLD-MCNC: 11.4 G/DL
HGB BLD-MCNC: 9.9 G/DL
HGB UR QL STRIP: ABNORMAL
HYALINE CASTS UR QL AUTO: 0 /LPF
HYPOCHROMIA BLD QL SMEAR: ABNORMAL
INR PPP: 4.5
KETONES UR QL STRIP: NEGATIVE
LEUKOCYTE ESTERASE UR QL STRIP: NEGATIVE
LYMPHOCYTES # BLD AUTO: 1.2 K/UL
LYMPHOCYTES NFR BLD: 7.2 %
MCH RBC QN AUTO: 25.8 PG
MCH RBC QN AUTO: 26.3 PG
MCHC RBC AUTO-ENTMCNC: 32.9 %
MCHC RBC AUTO-ENTMCNC: 33.2 %
MCV RBC AUTO: 78 FL
MCV RBC AUTO: 80 FL
MICROSCOPIC COMMENT: NORMAL
MITRAL VALVE MOBILITY: NORMAL
MONOCYTES # BLD AUTO: 1.2 K/UL
MONOCYTES NFR BLD: 7.3 %
NEUTROPHILS # BLD AUTO: 14.2 K/UL
NEUTROPHILS NFR BLD: 84.7 %
NITRITE UR QL STRIP: NEGATIVE
OVALOCYTES BLD QL SMEAR: ABNORMAL
PH UR STRIP: 6 [PH] (ref 5–8)
PHOSPHATE SERPL-MCNC: 3.3 MG/DL
PHOSPHATE SERPL-MCNC: 4.5 MG/DL
PLATELET # BLD AUTO: 138 K/UL
PLATELET # BLD AUTO: 154 K/UL
PMV BLD AUTO: 12.5 FL
PMV BLD AUTO: ABNORMAL FL
POIKILOCYTOSIS BLD QL SMEAR: SLIGHT
POLYCHROMASIA BLD QL SMEAR: ABNORMAL
POTASSIUM SERPL-SCNC: 4.2 MMOL/L
POTASSIUM SERPL-SCNC: 5.5 MMOL/L
PROT UR QL STRIP: ABNORMAL
PROT UR-MCNC: 62 MG/DL
PROT/CREAT RATIO, UR: 0.31
PROTHROMBIN TIME: 45.8 SEC
RBC # BLD AUTO: 3.84 M/UL
RBC # BLD AUTO: 4.33 M/UL
RBC #/AREA URNS AUTO: 1 /HPF (ref 0–4)
RETIRED EF AND QEF - SEE NOTES: 55 (ref 55–65)
SODIUM SERPL-SCNC: 131 MMOL/L
SODIUM SERPL-SCNC: 134 MMOL/L
SP GR UR STRIP: >=1.03 (ref 1–1.03)
SPECIMEN SOURCE: NORMAL
TACROLIMUS BLD-MCNC: 4.6 NG/ML
TACROLIMUS BLD-MCNC: 5.7 NG/ML
TRICUSPID VALVE REGURGITATION: ABNORMAL
TROPONIN I SERPL DL<=0.01 NG/ML-MCNC: 0.03 NG/ML
TROPONIN I SERPL DL<=0.01 NG/ML-MCNC: 0.03 NG/ML
URN SPEC COLLECT METH UR: ABNORMAL
UROBILINOGEN UR STRIP-ACNC: NEGATIVE EU/DL
WBC # BLD AUTO: 16.74 K/UL
WBC # BLD AUTO: 19.23 K/UL
WBC #/AREA URNS AUTO: 1 /HPF (ref 0–5)

## 2017-01-24 PROCEDURE — 63600175 PHARM REV CODE 636 W HCPCS: Performed by: TRANSPLANT SURGERY

## 2017-01-24 PROCEDURE — 80048 BASIC METABOLIC PNL TOTAL CA: CPT

## 2017-01-24 PROCEDURE — 63600175 PHARM REV CODE 636 W HCPCS: Performed by: INTERNAL MEDICINE

## 2017-01-24 PROCEDURE — 99900035 HC TECH TIME PER 15 MIN (STAT)

## 2017-01-24 PROCEDURE — 85520 HEPARIN ASSAY: CPT

## 2017-01-24 PROCEDURE — 93306 TTE W/DOPPLER COMPLETE: CPT | Mod: 26,,, | Performed by: INTERNAL MEDICINE

## 2017-01-24 PROCEDURE — 25000242 PHARM REV CODE 250 ALT 637 W/ HCPCS: Performed by: INTERNAL MEDICINE

## 2017-01-24 PROCEDURE — 84484 ASSAY OF TROPONIN QUANT: CPT

## 2017-01-24 PROCEDURE — 27000221 HC OXYGEN, UP TO 24 HOURS

## 2017-01-24 PROCEDURE — 94640 AIRWAY INHALATION TREATMENT: CPT

## 2017-01-24 PROCEDURE — 93306 TTE W/DOPPLER COMPLETE: CPT

## 2017-01-24 PROCEDURE — 63600175 PHARM REV CODE 636 W HCPCS: Performed by: STUDENT IN AN ORGANIZED HEALTH CARE EDUCATION/TRAINING PROGRAM

## 2017-01-24 PROCEDURE — 99223 1ST HOSP IP/OBS HIGH 75: CPT | Mod: ,,, | Performed by: SURGERY

## 2017-01-24 PROCEDURE — 20600001 HC STEP DOWN PRIVATE ROOM

## 2017-01-24 PROCEDURE — 63600175 PHARM REV CODE 636 W HCPCS: Performed by: HOSPITALIST

## 2017-01-24 PROCEDURE — 82570 ASSAY OF URINE CREATININE: CPT

## 2017-01-24 PROCEDURE — 81001 URINALYSIS AUTO W/SCOPE: CPT

## 2017-01-24 PROCEDURE — 25000003 PHARM REV CODE 250: Performed by: EMERGENCY MEDICINE

## 2017-01-24 PROCEDURE — 25000003 PHARM REV CODE 250: Performed by: STUDENT IN AN ORGANIZED HEALTH CARE EDUCATION/TRAINING PROGRAM

## 2017-01-24 PROCEDURE — 87086 URINE CULTURE/COLONY COUNT: CPT

## 2017-01-24 PROCEDURE — 85025 COMPLETE CBC W/AUTO DIFF WBC: CPT

## 2017-01-24 PROCEDURE — 84165 PROTEIN E-PHORESIS SERUM: CPT

## 2017-01-24 PROCEDURE — 85610 PROTHROMBIN TIME: CPT

## 2017-01-24 PROCEDURE — 99233 SBSQ HOSP IP/OBS HIGH 50: CPT | Mod: ,,, | Performed by: INTERNAL MEDICINE

## 2017-01-24 PROCEDURE — 63600175 PHARM REV CODE 636 W HCPCS: Performed by: EMERGENCY MEDICINE

## 2017-01-24 PROCEDURE — 80197 ASSAY OF TACROLIMUS: CPT

## 2017-01-24 PROCEDURE — 84165 PROTEIN E-PHORESIS SERUM: CPT | Mod: 26,,, | Performed by: PATHOLOGY

## 2017-01-24 PROCEDURE — 94761 N-INVAS EAR/PLS OXIMETRY MLT: CPT

## 2017-01-24 PROCEDURE — 85730 THROMBOPLASTIN TIME PARTIAL: CPT

## 2017-01-24 PROCEDURE — 80069 RENAL FUNCTION PANEL: CPT

## 2017-01-24 PROCEDURE — 25000003 PHARM REV CODE 250: Performed by: INTERNAL MEDICINE

## 2017-01-24 PROCEDURE — 99222 1ST HOSP IP/OBS MODERATE 55: CPT | Mod: ,,, | Performed by: INTERNAL MEDICINE

## 2017-01-24 PROCEDURE — 25000003 PHARM REV CODE 250: Performed by: HOSPITALIST

## 2017-01-24 PROCEDURE — 36415 COLL VENOUS BLD VENIPUNCTURE: CPT

## 2017-01-24 PROCEDURE — 25000003 PHARM REV CODE 250

## 2017-01-24 PROCEDURE — 84100 ASSAY OF PHOSPHORUS: CPT

## 2017-01-24 PROCEDURE — 85027 COMPLETE CBC AUTOMATED: CPT

## 2017-01-24 RX ORDER — COLCHICINE 0.6 MG/1
0.6 TABLET, FILM COATED ORAL DAILY
Status: DISCONTINUED | OUTPATIENT
Start: 2017-01-24 | End: 2017-01-24

## 2017-01-24 RX ORDER — METHYLPREDNISOLONE SOD SUCC 125 MG
125 VIAL (EA) INJECTION ONCE
Status: COMPLETED | OUTPATIENT
Start: 2017-01-24 | End: 2017-01-24

## 2017-01-24 RX ORDER — CLOPIDOGREL BISULFATE 75 MG/1
75 TABLET ORAL DAILY
Status: DISCONTINUED | OUTPATIENT
Start: 2017-01-24 | End: 2017-01-24

## 2017-01-24 RX ORDER — ASPIRIN 325 MG
975 TABLET ORAL 4 TIMES DAILY
Status: DISCONTINUED | OUTPATIENT
Start: 2017-01-24 | End: 2017-01-24

## 2017-01-24 RX ORDER — DOXAZOSIN 4 MG/1
4 TABLET ORAL EVERY 12 HOURS
COMMUNITY
End: 2018-10-27 | Stop reason: SDUPTHER

## 2017-01-24 RX ORDER — IPRATROPIUM BROMIDE AND ALBUTEROL SULFATE 2.5; .5 MG/3ML; MG/3ML
3 SOLUTION RESPIRATORY (INHALATION) EVERY 4 HOURS
Status: COMPLETED | OUTPATIENT
Start: 2017-01-24 | End: 2017-01-24

## 2017-01-24 RX ORDER — ATORVASTATIN CALCIUM 20 MG/1
80 TABLET, FILM COATED ORAL DAILY
Status: DISCONTINUED | OUTPATIENT
Start: 2017-01-24 | End: 2017-01-24

## 2017-01-24 RX ORDER — HYDROMORPHONE HYDROCHLORIDE 1 MG/ML
1 INJECTION, SOLUTION INTRAMUSCULAR; INTRAVENOUS; SUBCUTANEOUS EVERY 4 HOURS PRN
Status: DISCONTINUED | OUTPATIENT
Start: 2017-01-24 | End: 2017-01-27 | Stop reason: HOSPADM

## 2017-01-24 RX ORDER — NITROGLYCERIN 0.4 MG/1
0.4 TABLET SUBLINGUAL
Status: COMPLETED | OUTPATIENT
Start: 2017-01-24 | End: 2017-01-24

## 2017-01-24 RX ORDER — TACROLIMUS 1 MG/1
3 CAPSULE ORAL EVERY EVENING
Status: DISCONTINUED | OUTPATIENT
Start: 2017-01-25 | End: 2017-01-27

## 2017-01-24 RX ORDER — NITROGLYCERIN 0.4 MG/1
TABLET SUBLINGUAL
Status: DISPENSED
Start: 2017-01-24 | End: 2017-01-24

## 2017-01-24 RX ORDER — COLCHICINE 0.6 MG/1
0.6 TABLET, FILM COATED ORAL 2 TIMES DAILY
Status: DISCONTINUED | OUTPATIENT
Start: 2017-01-24 | End: 2017-01-25

## 2017-01-24 RX ORDER — NAPROXEN SODIUM 220 MG/1
81 TABLET, FILM COATED ORAL DAILY
Status: DISCONTINUED | OUTPATIENT
Start: 2017-01-24 | End: 2017-01-24

## 2017-01-24 RX ORDER — MORPHINE SULFATE 2 MG/ML
1 INJECTION, SOLUTION INTRAMUSCULAR; INTRAVENOUS
Status: COMPLETED | OUTPATIENT
Start: 2017-01-24 | End: 2017-01-24

## 2017-01-24 RX ORDER — HEPARIN SODIUM,PORCINE/D5W 25000/250
17 INTRAVENOUS SOLUTION INTRAVENOUS CONTINUOUS
Status: DISCONTINUED | OUTPATIENT
Start: 2017-01-24 | End: 2017-01-24

## 2017-01-24 RX ORDER — ATORVASTATIN CALCIUM 20 MG/1
40 TABLET, FILM COATED ORAL DAILY
Status: DISCONTINUED | OUTPATIENT
Start: 2017-01-25 | End: 2017-01-27 | Stop reason: HOSPADM

## 2017-01-24 RX ORDER — CLOPIDOGREL 300 MG/1
300 TABLET, FILM COATED ORAL ONCE
Status: COMPLETED | OUTPATIENT
Start: 2017-01-24 | End: 2017-01-24

## 2017-01-24 RX ADMIN — METOPROLOL TARTRATE 50 MG: 50 TABLET, FILM COATED ORAL at 08:01

## 2017-01-24 RX ADMIN — SODIUM CHLORIDE, SODIUM LACTATE, POTASSIUM CHLORIDE, AND CALCIUM CHLORIDE: .6; .31; .03; .02 INJECTION, SOLUTION INTRAVENOUS at 08:01

## 2017-01-24 RX ADMIN — CLOPIDOGREL BISULFATE 300 MG: 300 TABLET, FILM COATED ORAL at 01:01

## 2017-01-24 RX ADMIN — MORPHINE SULFATE 4 MG: 2 INJECTION, SOLUTION INTRAMUSCULAR; INTRAVENOUS at 01:01

## 2017-01-24 RX ADMIN — DOXAZOSIN 6 MG: 4 TABLET ORAL at 08:01

## 2017-01-24 RX ADMIN — ATORVASTATIN CALCIUM 80 MG: 20 TABLET, FILM COATED ORAL at 08:01

## 2017-01-24 RX ADMIN — IPRATROPIUM BROMIDE AND ALBUTEROL SULFATE 3 ML: .5; 3 SOLUTION RESPIRATORY (INHALATION) at 05:01

## 2017-01-24 RX ADMIN — TACROLIMUS 4 MG: 1 CAPSULE, GELATIN COATED ORAL at 08:01

## 2017-01-24 RX ADMIN — MORPHINE SULFATE 1 MG: 2 INJECTION, SOLUTION INTRAMUSCULAR; INTRAVENOUS at 08:01

## 2017-01-24 RX ADMIN — MORPHINE SULFATE 4 MG: 2 INJECTION, SOLUTION INTRAMUSCULAR; INTRAVENOUS at 05:01

## 2017-01-24 RX ADMIN — HEPARIN SODIUM AND DEXTROSE 17 UNITS/KG/HR: 10000; 5 INJECTION INTRAVENOUS at 02:01

## 2017-01-24 RX ADMIN — METHYLPREDNISOLONE SODIUM SUCCINATE 125 MG: 125 INJECTION, POWDER, FOR SOLUTION INTRAMUSCULAR; INTRAVENOUS at 11:01

## 2017-01-24 RX ADMIN — NIFEDIPINE 60 MG: 30 TABLET, FILM COATED, EXTENDED RELEASE ORAL at 08:01

## 2017-01-24 RX ADMIN — ASPIRIN 325 MG ORAL TABLET 975 MG: 325 PILL ORAL at 01:01

## 2017-01-24 RX ADMIN — HYDROMORPHONE HYDROCHLORIDE 1 MG: 1 INJECTION, SOLUTION INTRAMUSCULAR; INTRAVENOUS; SUBCUTANEOUS at 08:01

## 2017-01-24 RX ADMIN — PANTOPRAZOLE SODIUM 40 MG: 40 TABLET, DELAYED RELEASE ORAL at 08:01

## 2017-01-24 RX ADMIN — ASPIRIN 81 MG CHEWABLE TABLET 81 MG: 81 TABLET CHEWABLE at 08:01

## 2017-01-24 RX ADMIN — HYDROMORPHONE HYDROCHLORIDE 1 MG: 1 INJECTION, SOLUTION INTRAMUSCULAR; INTRAVENOUS; SUBCUTANEOUS at 09:01

## 2017-01-24 RX ADMIN — IPRATROPIUM BROMIDE AND ALBUTEROL SULFATE 3 ML: .5; 3 SOLUTION RESPIRATORY (INHALATION) at 09:01

## 2017-01-24 RX ADMIN — NITROGLYCERIN 0.4 MG: 0.4 TABLET SUBLINGUAL at 10:01

## 2017-01-24 RX ADMIN — COLCHICINE 0.6 MG: 0.6 TABLET, FILM COATED ORAL at 01:01

## 2017-01-24 NOTE — PROGRESS NOTES
[facility] Hospital Medicine  Progress Note    Patient Name: Ibrahima Phelps  MRN: [id number]  Patient Class: IP- Inpatient   Admission Date: 1/23/2017  Length of Stay: 1 days  Attending Physician: Christina Campbell MD  Primary Care Provider: Scott Miranda Jr, MD    Hospital Medicine Team: [facility] HOSP MED 3 Jose Santacruz MD    Subjective:     Principal Problem:Acute pericarditis    HPI:  Ibrahima Phelps is a 60yo male with PMH of HTN, HLD, CAD, pericarditis, afib, thyroid disease, s/p kidney transplant 2005, coronary angioplasty with stent 2006, cardioversion, admitted via ED for complaint of chest pain and SOB.  Patient describes the chest pain as a soreness and difficulty moving his chest cavity that started 2 days ago on the R lower parasternal region that spread to the L lower parasternal region as well.  He also states that the pain starts in his back between his scapula and radiates forward.  He denies recent heavy exercise, no trauma.  He reports that the pain is constantly present but waxes and wanes in intensity.  His pain worsens with movement, taking deep breaths, and coughing.  He has breathing treatments at home that has not relieved his pain or his SOB.  He has a history of pericarditis but states that the pericarditis pain is not the same as his current pain.  He denies having had this pain before.  He states that he has had loss of appetite in the past 2 days and attributes it to coughing.  Patient denies fever/chills, headache, sore throat, nausea, vomiting, abd pain, dysuria, diarrhea, weakness/numbness.    Patient was at [facility] ED the past two days for the same complaint.  On the first visit, his symptoms appeared to be musculoskeletal and he was discharged with flexeril which he states helped a little.  On the second visit he had mildly elevated troponin at 0.034 and cardiology stated he was ok for discharge.  On 1/23 patient saw his PCP who sent the patient back to the  ED.      Interval History: Patient in distress this AM.  States that chest pain has remained the same since admission.  Denies fevers, chills, N/V, shortness of breath, or abdominal pains.      Review of Systems   Constitutional: Positive for appetite change. Negative for activity change, chills and fever.   HENT: Negative for sinus pressure, sneezing, sore throat and trouble swallowing.    Eyes: Negative for visual disturbance.   Respiratory: Positive for cough, shortness of breath and wheezing. Negative for choking and stridor.    Cardiovascular: Positive for chest pain. Negative for palpitations and leg swelling.   Gastrointestinal: Negative for abdominal distention, abdominal pain, constipation, diarrhea, nausea and vomiting.   Genitourinary: Negative for dysuria.   Musculoskeletal: Negative for back pain, myalgias, neck pain and neck stiffness.   Skin: Negative for color change, pallor, rash and wound.   Neurological: Negative for weakness, light-headedness, numbness and headaches.   All other systems reviewed and are negative.    Objective:     Vital Signs (Most Recent):  Temp: 98.8 °F (37.1 °C) (01/24/17 0758)  Pulse: 81 (01/24/17 1202)  Resp: (!) 25 (01/24/17 1034)  BP: 124/65 (01/24/17 1202)  SpO2: (!) 93 % (01/24/17 1202) Vital Signs (24h Range):  Temp:  [98.8 °F (37.1 °C)-100.2 °F (37.9 °C)] 98.8 °F (37.1 °C)  Pulse:  [] 81  Resp:  [22-32] 25  SpO2:  [92 %-100 %] 93 %  BP: (111-190)/(58-86) 124/65     Weight: 113.4 kg (250 lb)  Body mass index is 32.98 kg/(m^2).    Intake/Output Summary (Last 24 hours) at 01/24/17 1355  Last data filed at 01/24/17 1100   Gross per 24 hour   Intake                0 ml   Output              625 ml   Net             -625 ml      Physical Exam   Constitutional: He is oriented to person, place, and time. He appears well-developed and well-nourished. No distress.   HENT:   Head: Normocephalic and atraumatic.   Mouth/Throat: Oropharynx is clear and moist. No oropharyngeal  exudate.   Eyes: Conjunctivae and EOM are normal. Pupils are equal, round, and reactive to light. No scleral icterus.   Neck: Normal range of motion. Neck supple. No JVD present. No tracheal deviation present.   Cardiovascular: Regular rhythm, normal heart sounds and intact distal pulses.  Exam reveals no gallop and no friction rub.    No murmur heard.  Tachycardic   Pulmonary/Chest: No stridor. He has wheezes. He has no rales. He exhibits no tenderness.   Labored breathing, tachypneic, wheezing throughout lungs and audible in room, breath sounds decreased at R base.   Abdominal: Soft. Bowel sounds are normal. He exhibits no distension. There is no tenderness.   Musculoskeletal: Normal range of motion. He exhibits no edema or tenderness.   Neurological: He is alert and oriented to person, place, and time.   Skin: Skin is warm and dry. No rash noted. He is not diaphoretic. No erythema.   Psychiatric: He has a normal mood and affect. His behavior is normal. Thought content normal.       Significant Labs:     Recent Results (from the past 24 hour(s))   CBC auto differential    Collection Time: 01/23/17  3:13 PM   Result Value Ref Range    WBC 12.80 (H) 3.90 - 12.70 K/uL    RBC 4.74 4.60 - 6.20 M/uL    Hemoglobin 12.5 (L) 14.0 - 18.0 g/dL    Hematocrit 38.3 (L) 40.0 - 54.0 %    MCV 81 (L) 82 - 98 fL    MCH 26.4 (L) 27.0 - 31.0 pg    MCHC 32.6 32.0 - 36.0 %    RDW 14.0 11.5 - 14.5 %    Platelets 159 150 - 350 K/uL    MPV 12.0 9.2 - 12.9 fL    Gran # 10.6 (H) 1.8 - 7.7 K/uL    Lymph # 1.2 1.0 - 4.8 K/uL    Mono # 1.0 0.3 - 1.0 K/uL    Eos # 0.0 0.0 - 0.5 K/uL    Baso # 0.01 0.00 - 0.20 K/uL    Gran% 82.5 (H) 38.0 - 73.0 %    Lymph% 9.3 (L) 18.0 - 48.0 %    Mono% 7.4 4.0 - 15.0 %    Eosinophil% 0.2 0.0 - 8.0 %    Basophil% 0.1 0.0 - 1.9 %    Differential Method Automated    Comprehensive metabolic panel    Collection Time: 01/23/17  3:13 PM   Result Value Ref Range    Sodium 137 136 - 145 mmol/L    Potassium 4.2 3.5 - 5.1  mmol/L    Chloride 104 95 - 110 mmol/L    CO2 21 (L) 23 - 29 mmol/L    Glucose 115 (H) 70 - 110 mg/dL    BUN, Bld 16 8 - 23 mg/dL    Creatinine 1.8 (H) 0.5 - 1.4 mg/dL    Calcium 7.5 (L) 8.7 - 10.5 mg/dL    Total Protein 7.5 6.0 - 8.4 g/dL    Albumin 3.4 (L) 3.5 - 5.2 g/dL    Total Bilirubin 0.6 0.1 - 1.0 mg/dL    Alkaline Phosphatase 76 55 - 135 U/L    AST 18 10 - 40 U/L    ALT 16 10 - 44 U/L    Anion Gap 12 8 - 16 mmol/L    eGFR if African American 45.6 (A) >60 mL/min/1.73 m^2    eGFR if non  39.5 (A) >60 mL/min/1.73 m^2   Protime-INR    Collection Time: 01/23/17  3:13 PM   Result Value Ref Range    Prothrombin Time 29.3 (H) 9.0 - 12.5 sec    INR 2.9 (H) 0.8 - 1.2   B-Type natriuretic peptide (BNP)    Collection Time: 01/23/17  3:13 PM   Result Value Ref Range     (H) 0 - 99 pg/mL   Sedimentation rate, manual    Collection Time: 01/23/17  3:13 PM   Result Value Ref Range    Sed Rate 68 (H) 0 - 10 mm/Hr   Troponin I    Collection Time: 01/23/17  3:25 PM   Result Value Ref Range    Troponin I 0.026 0.000 - 0.026 ng/mL   Troponin I    Collection Time: 01/23/17  5:41 PM   Result Value Ref Range    Troponin I 0.050 (H) 0.000 - 0.026 ng/mL   CK    Collection Time: 01/23/17  5:41 PM   Result Value Ref Range     (H) 20 - 200 U/L   C-reactive protein    Collection Time: 01/23/17  5:41 PM   Result Value Ref Range    .4 (H) 0.0 - 8.2 mg/L   Blood Culture #1 **CANNOT BE ORDERED STAT**    Collection Time: 01/23/17  7:18 PM   Result Value Ref Range    Blood Culture, Routine No Growth to date    Blood Culture #2 **CANNOT BE ORDERED STAT**    Collection Time: 01/23/17  7:25 PM   Result Value Ref Range    Blood Culture, Routine No Growth to date    Tacrolimus level    Collection Time: 01/23/17 11:35 PM   Result Value Ref Range    Tacrolimus Lvl 4.6 (L) 5.0 - 15.0 ng/mL   Troponin I    Collection Time: 01/23/17 11:35 PM   Result Value Ref Range    Troponin I 0.032 (H) 0.000 - 0.026 ng/mL    Influenza antigen Nasopharyngeal Swab    Collection Time: 01/23/17 11:37 PM   Result Value Ref Range    Influenza A Ag, EIA Negative Negative    Influenza B Ag, EIA Negative Negative    Flu A & B Source Nasopharyngeal Swab    APTT    Collection Time: 01/24/17  1:01 AM   Result Value Ref Range    aPTT 33.3 (H) 21.0 - 32.0 sec   Urinalysis    Collection Time: 01/24/17  1:13 AM   Result Value Ref Range    Specimen UA Urine, Clean Catch     Color, UA Yellow Yellow, Straw, Lily    Appearance, UA Clear Clear    pH, UA 6.0 5.0 - 8.0    Specific Gravity, UA >=1.030 (A) 1.005 - 1.030    Protein, UA 1+ (A) Negative    Glucose, UA Negative Negative    Ketones, UA Negative Negative    Bilirubin (UA) Negative Negative    Occult Blood UA 1+ (A) Negative    Nitrite, UA Negative Negative    Urobilinogen, UA Negative <2.0 EU/dL    Leukocytes, UA Negative Negative   Urinalysis Microscopic    Collection Time: 01/24/17  1:13 AM   Result Value Ref Range    RBC, UA 1 0 - 4 /hpf    WBC, UA 1 0 - 5 /hpf    Bacteria, UA None None-Occ /hpf    Hyaline Casts, UA 0 0-1/lpf /lpf    Microscopic Comment SEE COMMENT    Phosphorus    Collection Time: 01/24/17  4:09 AM   Result Value Ref Range    Phosphorus 3.3 2.7 - 4.5 mg/dL   CBC with Automated Differential    Collection Time: 01/24/17  4:09 AM   Result Value Ref Range    WBC 16.74 (H) 3.90 - 12.70 K/uL    RBC 3.84 (L) 4.60 - 6.20 M/uL    Hemoglobin 9.9 (L) 14.0 - 18.0 g/dL    Hematocrit 29.8 (L) 40.0 - 54.0 %    MCV 78 (L) 82 - 98 fL    MCH 25.8 (L) 27.0 - 31.0 pg    MCHC 33.2 32.0 - 36.0 %    RDW 14.7 (H) 11.5 - 14.5 %    Platelets 138 (L) 150 - 350 K/uL    MPV SEE COMMENT 9.2 - 12.9 fL    Gran # 14.2 (H) 1.8 - 7.7 K/uL    Lymph # 1.2 1.0 - 4.8 K/uL    Mono # 1.2 (H) 0.3 - 1.0 K/uL    Eos # 0.0 0.0 - 0.5 K/uL    Baso # 0.01 0.00 - 0.20 K/uL    Gran% 84.7 (H) 38.0 - 73.0 %    Lymph% 7.2 (L) 18.0 - 48.0 %    Mono% 7.3 4.0 - 15.0 %    Eosinophil% 0.2 0.0 - 8.0 %    Basophil% 0.1 0.0 - 1.9 %     Aniso Slight     Poik Slight     Poly Occasional     Hypo Occasional     Ovalocytes Occasional     Qing Cells Occasional     Differential Method Automated    PT/INR    Collection Time: 01/24/17  4:09 AM   Result Value Ref Range    Prothrombin Time 45.8 (H) 9.0 - 12.5 sec    INR 4.5 (H) 0.8 - 1.2   Basic Metabolic Panel (BMP)    Collection Time: 01/24/17  4:09 AM   Result Value Ref Range    Sodium 131 (L) 136 - 145 mmol/L    Potassium 5.5 (H) 3.5 - 5.1 mmol/L    Chloride 104 95 - 110 mmol/L    CO2 16 (L) 23 - 29 mmol/L    Glucose 79 70 - 110 mg/dL    BUN, Bld 13 8 - 23 mg/dL    Creatinine 1.2 0.5 - 1.4 mg/dL    Calcium 6.6 (LL) 8.7 - 10.5 mg/dL    Anion Gap 11 8 - 16 mmol/L    eGFR if African American >60.0 >60 mL/min/1.73 m^2    eGFR if non African American >60.0 >60 mL/min/1.73 m^2   Tacrolimus level    Collection Time: 01/24/17  4:09 AM   Result Value Ref Range    Tacrolimus Lvl 5.7 5.0 - 15.0 ng/mL   Troponin I    Collection Time: 01/24/17  4:09 AM   Result Value Ref Range    Troponin I 0.028 (H) 0.000 - 0.026 ng/mL   Protein electrophoresis, serum    Collection Time: 01/24/17  9:46 AM   Result Value Ref Range    Protein, Serum 5.7 (L) 6.0 - 8.4 g/dL   Anti-Xa Heparin Monitoring    Collection Time: 01/24/17  9:46 AM   Result Value Ref Range    Heparin Anti-Xa 0.26 (L) 0.30 - 0.70 IU/mL   Renal function panel    Collection Time: 01/24/17  9:46 AM   Result Value Ref Range    Glucose 105 70 - 110 mg/dL    Sodium 134 (L) 136 - 145 mmol/L    Potassium 4.2 3.5 - 5.1 mmol/L    Chloride 103 95 - 110 mmol/L    CO2 22 (L) 23 - 29 mmol/L    BUN, Bld 16 8 - 23 mg/dL    Calcium 7.0 (L) 8.7 - 10.5 mg/dL    Creatinine 1.9 (H) 0.5 - 1.4 mg/dL    Albumin 2.7 (L) 3.5 - 5.2 g/dL    Phosphorus 4.5 2.7 - 4.5 mg/dL    eGFR if  42.7 (A) >60 mL/min/1.73 m^2    eGFR if non  37.0 (A) >60 mL/min/1.73 m^2    Anion Gap 9 8 - 16 mmol/L         Significant Imaging: I have reviewed and interpreted all pertinent  imaging results/findings within the past 24 hours.     CXR 1/24/2017    The cardiomediastinal silhouette is within normal limits.  Visualized airway is unremarkable.  There is no evidence of pneumothorax or large pleural effusion.  There is atelectatic change of the lower lung zones.  Overall, no significant interval change from prior study.    CT Chest non-coronary 1/23/2017    1.  No evidence of pulmonary thromboembolism to the level of the segmental arteries bilaterally.    2.  Dependent secretions in the trachea, suggesting potential aspiration.    3.  Coronary artery atherosclerosis.    4.  Bibasilar atelectasis.    Assessment/Plan:      * Acute pericarditis  -troponins (mildly elevated) and EKG with new T-wave inversions for possible acute coronary syndrome, re-assessed and was determined to have pericarditis   - troponemia likely 2/2 from pericarditis with myocardial inflammation   -CTA reveals no PE, pleural effusions, pneumonia.  Shows bibasilar atelectasis.  -BNP unremarkable, no signs of acute HF  -elevated ,   - per cardiology recommendations, will do aspirin 1000 mg PO QID, predisone 50 mg PO daily, solumedrol 125 mg IV once, and colchicine 0.6 mg PO BID    Sepsis  -SIRS 3/4 on admission  -afebrile throughout stay   -WBC 12.8 trending up since 1/21  -tachycardic, tachypneic on admission  -no clear source, no pneumonia on CTA, patient received IV avelox once in ED  - no indication for infectious etiology at this time, likely from pericarditis  -BCx show NGTD times 1. Urine culture pending       S/P kidney transplant  -transplant in 2005  -continue prograf, prednisone per pericarditis dosing   -daily prograf levels  -hold mycophenolate while admitted  -Cr 1.9, around baseline per previous measurements   -appreciate KMT recommendations     Hypertension  -continue metoprolol, spironolactone, nifedipine, doxazosin  -hold lasix in setting of transplant kidney and contrast    SOB (shortness of  breath)  -wheezing, improved with breathing treatments, continue duonebs q4H PRN  - likely 2/2 to pericarditis and pain, will continue NC oxygen and wean as tolerated       Atrial fibrillation with rapid ventricular response  -continue metoprolol, nifedipine  -1/23 INR 4.5, supratheraputic, hold warfarin for now   -continue to monitor INR daily     VTE Risk Mitigation         Ordered     Medium Risk of VTE  Once      01/23/17 2152     Place sequential compression device  Until discontinued      01/23/17 2152     Place RASHAD hose  Until discontinued      01/23/17 2152          Jose Santacruz MD  Department of Hospital Medicine   [facility]

## 2017-01-24 NOTE — MEDICAL/APP STUDENT
History     Chief Complaint   Patient presents with    Chest Pain     seen here sat and sunday for same thing, sent home, sent here from IM clinic per dr Keith to rule out PE     HPI Comments: 62 yo male h/o HTN, HLD, CAD, pericarditis, A-fib, kidney transplant, angioplasty w/ stent presents with substernal chest pain radiating to back & SOB. Pt seen by his PCP and referred to ED. Pain is sharp, non-radiating, unchanged with pressure, worse with inspiration, & worse with movement. 2 days ago pain began on R chest & gradually spread to left involving entire substernal region. Pain slightly relieved with hydrocodone & morphine in ED. Pt was seen in ED on Saturday & Sunday with same presentation- suspected MSK pathology, given flexaril for pain.        Chest Pain    This is a new problem. The current episode started in the past 7 days. The onset quality is gradual. The problem occurs constantly. The problem has been waxing and waning. The pain is present in the substernal region. The pain is moderate. The quality of the pain is described as sharp. The pain radiates to the mid back. Associated symptoms include shortness of breath. The pain is aggravated by breathing, deep breathing, exertion and movement. He has tried analgesics for the symptoms. Risk factors include male gender and smoking/tobacco exposure.   His past medical history is significant for arrhythmia, CAD, hyperlipidemia and hypertension.       Past Medical History   Diagnosis Date    Atrial fibrillation     CAD (coronary artery disease)     Diverticulosis     Hyperlipidemia     Hypertension     Immunodeficiency due to treatment with immunosuppressive medication     Metabolic bone disease     Pericarditis     S/P kidney transplant     Thrombocytopenia     Thyroid disease     Unspecified disorder of kidney and ureter      Stage IIICKD       Past Surgical History   Procedure Laterality Date    Cholecystectomy      Coronary angioplasty with  "stent placement  9/13/2006    Colonoscopy  April 20, 2011     Diverticulosis, repeat recommended in 3 yrs.    Cardioversion  04/30/15    Kidney transplant  2005    Parathyroidectomy         Family History   Problem Relation Age of Onset    No Known Problems Sister     No Known Problems Brother     Thyroid disease Mother      s/p surgery    Heart disease Father      had pacemaker    Heart disease Daughter      enlarged heart    No Known Problems Sister     Kidney failure Sister     Kidney disease Sister     No Known Problems Sister     Kidney disease Brother     Kidney failure Brother      s/p transplant    No Known Problems Brother     Diabetes Mellitus Neg Hx     Stroke Neg Hx     Heart attack Neg Hx     Cancer Neg Hx     Celiac disease Neg Hx     Cirrhosis Neg Hx     Colon cancer Neg Hx     Esophageal cancer Neg Hx     Inflammatory bowel disease Neg Hx     Rectal cancer Neg Hx     Stomach cancer Neg Hx     Ulcerative colitis Neg Hx     Liver disease Neg Hx     Liver cancer Neg Hx     Crohn's disease Neg Hx        Social History   Substance Use Topics    Smoking status: Current Every Day Smoker     Packs/day: 0.50     Years: 40.00     Types: Cigarettes    Smokeless tobacco: Never Used      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    Alcohol use No       Review of Systems   Respiratory: Positive for shortness of breath.    Cardiovascular: Positive for chest pain.       Physical Exam     Visit Vitals    BP (!) 178/74    Pulse 104    Temp 99.7 °F (37.6 °C) (Oral)    Resp (!) 32    Ht 6' 1" (1.854 m)    Wt 113.4 kg (250 lb)    SpO2 100%    BMI 32.98 kg/m2       Physical Exam    Constitutional: He appears well-developed and well-nourished.   Eyes: Conjunctivae are normal. Pupils are equal, round, and reactive to light.   Cardiovascular: Regular rhythm.   Pulmonary/Chest: He has wheezes. He exhibits no tenderness.   Abdominal: Soft. " There is no tenderness.   Musculoskeletal: He exhibits no tenderness.         ED Course

## 2017-01-24 NOTE — PROGRESS NOTES
Saw patient from cardiac standpoint for suspicion of aortic dissection  bp = in both arms, albeit high (169/75 L, 179/84 R), pulses = bilaterally  Ct chest pe protocol inconclusive (timing of dye)  Trop mildly elevated with inferior st changes suggestive more of acs in patient with prior stent than dissection  Spoke with ed fellow about this on phone and he will have cards consult fellow see patient and provide recs.

## 2017-01-24 NOTE — ASSESSMENT & PLAN NOTE
-transplant in 2005  -continue prograf, prednisone  -daily prograf levels  -hold mycophenolate while admitted  -Cr 1.8, appears to be baseline  -will inform KTM about patient in the morning  -continous IVF 125cc/hr due to patient having received contrast with PE study.

## 2017-01-24 NOTE — PROGRESS NOTES
Cardiology Consult Note    Reason for Consultation: NSTEMI  Attending Requesting: Shannan  SUBJECTIVE:     History of Present Illness:  Patient is a 61 y.o. male with CAD (prior DIEGO to LAD with Dr. Viveros in 2006), AF on coumadin, prior pericarditis, HLD, s/p kidney transplant 2005 and current smoker presents for second time in two days for chest pain. [place] [facility] [place] for chest discomfort, shortness of breath and back pain. He was working Friday and straining a bit more than usual with no difficulty but woke up Saturday morning with substernal chest discomfort, upper back pain and shortness of breath. Pain has not relented since its onset 3 days ago. Pain came on when he in bed, and is worsened with deep inspiration or positional changes such as sitting up in bed. This pain, again, does not relent, and then come back in a few hours with crushing substernal pain. Rather, this pain has been the same severity since its onset 3 days ago, constantly present, and sometimes radiating to the left side but mostly present in the chest and back.    He has not had much of an appetite since the symptoms started and also has not been very active because of the discomfort so he cannot discern whether the pain is worse with exertion. He was discharged from the ED as his pain was felt to be non-cardiac in origin. He saw his PCP with the same symptoms and was sent back to the ER. In the ER, he was given 325mg ASA. A trial of nitroglycerin sublingual did not significantly improve his chest pain ( given by cardiology at bedside). He still has significant discomfort in his chest and upper back.  Cardiology was consulted for evaluation of chest pain.     Specifically denies any recent fevers, malaise, sick contacts, fatigue, dizziness or syncope. He reports two prior episodes of pericarditis; he does not remember what year but notes it was after the kidney transplant. This pain is significantly different from the prior  episodes.     Review of patient's allergies indicates:   Allergen Reactions    Ace inhibitors      Other reaction(s): Swelling    Povidone-iodine      Other reaction(s): Itching  Other reaction(s): Itching    Verapamil Other (See Comments)     Other reaction(s): Unknown       Past Medical History   Diagnosis Date    Atrial fibrillation     CAD (coronary artery disease)     Diverticulosis     Hyperlipidemia     Hypertension     Immunodeficiency due to treatment with immunosuppressive medication     Metabolic bone disease     Pericarditis     S/P kidney transplant     Thrombocytopenia     Thyroid disease     Unspecified disorder of kidney and ureter      Stage IIICKD     Past Surgical History   Procedure Laterality Date    Cholecystectomy      Coronary angioplasty with stent placement  9/13/2006    Colonoscopy  April 20, 2011     Diverticulosis, repeat recommended in 3 yrs.    Cardioversion  04/30/15    Kidney transplant  2005    Parathyroidectomy       Family History   Problem Relation Age of Onset    No Known Problems Sister     No Known Problems Brother     Thyroid disease Mother      s/p surgery    Heart disease Father      had pacemaker    Heart disease Daughter      enlarged heart    No Known Problems Sister     Kidney failure Sister     Kidney disease Sister     No Known Problems Sister     Kidney disease Brother     Kidney failure Brother      s/p transplant    No Known Problems Brother     Diabetes Mellitus Neg Hx     Stroke Neg Hx     Heart attack Neg Hx     Cancer Neg Hx     Celiac disease Neg Hx     Cirrhosis Neg Hx     Colon cancer Neg Hx     Esophageal cancer Neg Hx     Inflammatory bowel disease Neg Hx     Rectal cancer Neg Hx     Stomach cancer Neg Hx     Ulcerative colitis Neg Hx     Liver disease Neg Hx     Liver cancer Neg Hx     Crohn's disease Neg Hx      Social History   Substance Use Topics    Smoking status: Current Every Day Smoker      Packs/day: 0.50     Years: 40.00     Types: Cigarettes    Smokeless tobacco: Never Used      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    Alcohol use No      Current Facility-Administered Medications   Medication Dose Route Frequency Provider Last Rate Last Dose    albuterol-ipratropium 2.5mg-0.5mg/3mL nebulizer solution 3 mL  3 mL Nebulization Q4H PRN Harjinder Cantrell MD        albuterol-ipratropium 2.5mg-0.5mg/3mL nebulizer solution 3 mL  3 mL Nebulization Q4H RUIZ To   3 mL at 01/24/17 0908    aspirin tablet 975 mg  975 mg Oral QID Jose Santacruz MD        [START ON 1/25/2017] atorvastatin tablet 40 mg  40 mg Oral Daily Jose Santacruz MD        colchicine capsule/tablet 0.6 mg  0.6 mg Oral BID RUIZ To        dextrose 50% injection 12.5 g  12.5 g Intravenous PRN Harjinder Cantrell MD        dextrose 50% injection 25 g  25 g Intravenous PRN Harjinder Cantrell MD        doxazosin tablet 6 mg  6 mg Oral Daily Blanca Sutton MD   6 mg at 01/24/17 0840    glucagon (human recombinant) injection 1 mg  1 mg Intramuscular PRN Harjinder Cantrell MD        glucose chewable tablet 16 g  16 g Oral PRN Harjinder Cantrell MD        glucose chewable tablet 24 g  24 g Oral PRN Harjinder Cantrell MD        hydromorphone (PF) injection 1 mg  1 mg Intravenous Q4H PRN RUIZ To   1 mg at 01/24/17 0940    metoprolol tartrate (LOPRESSOR) tablet 50 mg  50 mg Oral BID Blanca Sutton MD   50 mg at 01/24/17 0840    nicotine 14 mg/24 hr 1 patch  1 patch Transdermal Daily Harjinder Cantrell MD   1 patch at 01/24/17 0918    nifedipine 30 MG ORAL TR24 24 hr tablet 60 mg  60 mg Oral Daily Blanca Sutton MD   60 mg at 01/24/17 0840    ondansetron disintegrating tablet 8 mg  8 mg Oral Q8H PRN Harjinder Cantrell MD        ondansetron injection 4 mg  4 mg Intravenous Q12H PRN Harjinder  Estrella Cantrell MD        pantoprazole EC tablet 40 mg  40 mg Oral Daily Blanca WHITEHEADDayan Sutton MD   40 mg at 01/24/17 0839    [START ON 1/25/2017] predniSONE tablet 50 mg  50 mg Oral Daily Christina Campbell MD        ramelteon tablet 8 mg  8 mg Oral Nightly PRN Harjinder Cantrell MD        tacrolimus capsule 3 mg  3 mg Oral Daily Harjinder Cantrell MD   3 mg at 01/23/17 2327    tacrolimus capsule 4 mg  4 mg Oral Daily Harjinder Cantrell MD   4 mg at 01/24/17 0840     Current Outpatient Prescriptions   Medication Sig Dispense Refill    aspirin (ADULT ASPIRIN EC LOW STRENGTH) 81 MG EC tablet Take 1 tablet by mouth Daily.      atorvastatin (LIPITOR) 10 MG tablet Take 1 tablet (10 mg total) by mouth once daily. 90 tablet 3    calcium carbonate (TUMS) 200 mg calcium (500 mg) chewable tablet Take 4 tablets by mouth once daily.       CALCIUM CARBONATE/VITAMIN D3 (VITAMIN D-3 ORAL) Take 1,000 Units by mouth 2 (two) times daily.      cyclobenzaprine (FLEXERIL) 5 MG tablet Take 1 tablet (5 mg total) by mouth 3 (three) times daily as needed for Muscle spasms. 30 tablet 0    doxazosin (CARDURA) 4 MG tablet Take 1.5 tablets (6 mg total) by mouth 2 (two) times daily. 270 tablet 3    fexofenadine (ALLEGRA) 60 MG tablet Take 1 tablet by mouth Twice daily as needed.      fluticasone (FLONASE) 50 mcg/actuation nasal spray 1 spray by Each Nare route once daily. 16 g 6    furosemide (LASIX) 40 MG tablet Take 0.5 tablets (20 mg total) by mouth 2 (two) times daily. 180 tablet 2    hydrocodone-acetaminophen 5-325mg (NORCO) 5-325 mg per tablet Take 1 tablet by mouth every 4 (four) hours as needed. 10 tablet 0    metoprolol tartrate (LOPRESSOR) 50 MG tablet Take 1 tablet (50 mg total) by mouth 2 (two) times daily. 180 tablet 3    multivitamin (THERAGRAN) per tablet Take 1 tablet by mouth Daily.      mycophenolate (CELLCEPT) 250 mg Cap TAKE 2 CAPSULES BY MOUTH EVERY 12 HOURS TO PREVENT KIDNEY TRANSPLANT REJECTION  360 capsule 3    nifedipine (ADALAT CC) 60 MG TbSR TAKE 1 TABLET BY MOUTH TWICE DAILY. 180 tablet 1    NTS STEP 1 21 mg/24 hr PLACE 1 PATCH ONTO THE SKIN ONCE DAILY. 28 patch 6    pantoprazole (PROTONIX) 40 MG tablet TAKE 1 TABLET BY MOUTH DAILY 90 tablet 3    paricalcitol (ZEMPLAR) 1 MCG capsule One po MWF 90 capsule 3    potassium chloride SA (K-DUR,KLOR-CON) 20 MEQ tablet TAKE 1 TABLET BY MOUTH DAILY 90 tablet 3    predniSONE (DELTASONE) 5 MG tablet TAKE 1 TABLET BY MOUTH EVERY MORNING 90 tablet 3    spironolactone (ALDACTONE) 25 MG tablet Take 1 tablet (25 mg total) by mouth once daily. 90 tablet 3    tacrolimus (PROGRAF) 1 MG Cap TAKE 4 CAPSULES BY MOUTH IN THE MORNING AND 3 CAPSULES IN THE EVENING 630 capsule 4    warfarin (COUMADIN) 5 MG tablet Take 1 tablet (5mg.) by mouth daily, except 1.5 (7.5mg) on Wednesday,  Or as directed by [facility] 40 tablet 5     Review of Systems:  Gen: denies fever fatigue weight loss, +chills  HEENT: denies HA, blurry vision, tinnitus, dry mouth  Resp: denies cough, hemoptysis, +SOB  CV: Denies , palps, orthopnea, PND, edema, +CP  GI: denies abd pain, nausea/vomiting, diarrhea, melena, constipation, hematochezia  : denies dysuria, hematuria  MSK: denies arthralgia, myalgia, +back pain  Neuro: denies LOC, weakness, seizure, HA, sensory loss  Psych: denies depression, anxiety, AH/VH  Endo: denies heat or cold intolerance, goiter  Heme: denies bleeding, infection, petechiae       OBJECTIVE:     Vital Signs (Most Recent)  Temp: 98.8 °F (37.1 °C) (01/24/17 0758)  Pulse: 81 (01/24/17 1202)  Resp: (!) 25 (01/24/17 1034)  BP: 124/65 (01/24/17 1202)  SpO2: (!) 93 % (01/24/17 1202)    Vital Signs Range (Last 24H):  Temp:  [98.8 °F (37.1 °C)-100.2 °F (37.9 °C)]   Pulse:  []   Resp:  [22-32]   BP: (111-190)/(58-86)   SpO2:  [91 %-100 %]     Physical Exam:  Gen: in significant distress from pain  HEENT: normocephalic, atraumatic, extraocular movements intact, pupils  equal round reactive to light, moist mucous membranes  Neck: no JVD, no carotid bruits  Resp: tachypneic, in significant distress with shallow breathing due to pain; mild crackles on posterior lung base predominantly on right > left lung   CV: regular rate and rhythm, normal S1/2, no murmurs, rubs, gallops, no S3/4 appreciated; no heaves or lifts noted; no precordial tenderness appreciated   Ext: warm well perfused, no edema, no clubbing or cyanosis   Abdomen: soft, +BS, non-distended   Neuro: A&O x 3. Follows commands. No focal deficits noted.       Laboratory:  Chemistry:  Lab Results   Component Value Date     (L) 01/24/2017    K 4.2 01/24/2017     01/24/2017    CO2 22 (L) 01/24/2017    BUN 16 01/24/2017    CREATININE 1.9 (H) 01/24/2017    GLUCOSE 121 (H) 04/12/2005    CALCIUM 7.0 (L) 01/24/2017     (H) 01/23/2017     Cardiac Markers:  Lab Results   Component Value Date    TROPONINI 0.028 (H) 01/24/2017     Cardiac Markers (Last 3):  Lab Results   Component Value Date    TROPONINI 0.028 (H) 01/24/2017    TROPONINI 0.032 (H) 01/23/2017    TROPONINI 0.050 (H) 01/23/2017     CBC:   Lab Results   Component Value Date    WBC 16.74 (H) 01/24/2017    HGB 9.9 (L) 01/24/2017    HCT 29.8 (L) 01/24/2017    MCV 78 (L) 01/24/2017     (L) 01/24/2017     Lipids:  Lab Results   Component Value Date    CHOL 146 06/11/2015    TRIG 150 06/11/2015    HDL 36 (L) 06/11/2015     Coagulation:   Lab Results   Component Value Date    INR 4.5 (H) 01/24/2017    INR 2.3 01/06/2017    APTT 33.3 (H) 01/24/2017       Diagnostic Results:  EKG -- NSR with LVH and repol abnormalities, inferior TWI, lateral TWI more pronounced compared to prior EKGs    ASSESSMENT/PLAN:     Mr. Phelps is a 61 year old man with CAD s/p DIEGO to LAD 2006, hx of kidney transplant and post-transplant pericarditis presenting with unrelenting chest pain of 3 days duration. His exam and presentation is most consistent with acute pericarditis of  unclear etiology.         Recommendations:     1. Acute pericarditis   - chest pain most likely due to acute pericarditis with unclear etiology; pt has two prior episodes of pericarditis with at least one time also with pericardial effusion that was drained   - unlikely to be due to ACS given that this is constant chest pain worsened with inspiration; pain has never relented since Saturday 3 days ago when it first started   - given ASA 81 mg, Plavix 300 mg once, on heparin gtt; ok to discontinue heparin gtt now   - if considered safe by kidney transplant team, recommend ASA 1000 mg QID   - prednisone 50 mg daily   - solumedrol 125 mg IV once today   - colchicine 0.6 mg twice daily     2. Elevated troponin   - this is unlikely to be secondary to ACS, especially since troponin peaked at 0.05 and quickly trended down   - most likely due to acute pericarditis with possibly some myocardial inflammation   - not planning cardiac cath currently, ok to stop heparin gtt now since chest pain etiology ulikely to be ACS       Discussed with attending Dr. Hein. Staff recommendations to follow.     Ulisses Millan MD   PGY1 Internal Medicine   [facility]   Pager [phone]

## 2017-01-24 NOTE — SUBJECTIVE & OBJECTIVE
Interval History: Patient in distress this AM.  States that chest pain has remained the same since admission.  Denies fevers, chills, N/V, shortness of breath, or abdominal pains.      Review of Systems   Constitutional: Positive for appetite change. Negative for activity change, chills and fever.   HENT: Negative for sinus pressure, sneezing, sore throat and trouble swallowing.    Eyes: Negative for visual disturbance.   Respiratory: Positive for cough, shortness of breath and wheezing. Negative for choking and stridor.    Cardiovascular: Positive for chest pain. Negative for palpitations and leg swelling.   Gastrointestinal: Negative for abdominal distention, abdominal pain, constipation, diarrhea, nausea and vomiting.   Genitourinary: Negative for dysuria.   Musculoskeletal: Negative for back pain, myalgias, neck pain and neck stiffness.   Skin: Negative for color change, pallor, rash and wound.   Neurological: Negative for weakness, light-headedness, numbness and headaches.   All other systems reviewed and are negative.    Objective:     Vital Signs (Most Recent):  Temp: 98.8 °F (37.1 °C) (01/24/17 0758)  Pulse: 81 (01/24/17 1202)  Resp: (!) 25 (01/24/17 1034)  BP: 124/65 (01/24/17 1202)  SpO2: (!) 93 % (01/24/17 1202) Vital Signs (24h Range):  Temp:  [98.8 °F (37.1 °C)-100.2 °F (37.9 °C)] 98.8 °F (37.1 °C)  Pulse:  [] 81  Resp:  [22-32] 25  SpO2:  [92 %-100 %] 93 %  BP: (111-190)/(58-86) 124/65     Weight: 113.4 kg (250 lb)  Body mass index is 32.98 kg/(m^2).    Intake/Output Summary (Last 24 hours) at 01/24/17 1355  Last data filed at 01/24/17 1100   Gross per 24 hour   Intake                0 ml   Output              625 ml   Net             -625 ml      Physical Exam   Constitutional: He is oriented to person, place, and time. He appears well-developed and well-nourished. No distress.   HENT:   Head: Normocephalic and atraumatic.   Mouth/Throat: Oropharynx is clear and moist. No oropharyngeal exudate.    Eyes: Conjunctivae and EOM are normal. Pupils are equal, round, and reactive to light. No scleral icterus.   Neck: Normal range of motion. Neck supple. No JVD present. No tracheal deviation present.   Cardiovascular: Regular rhythm, normal heart sounds and intact distal pulses.  Exam reveals no gallop and no friction rub.    No murmur heard.  Tachycardic   Pulmonary/Chest: No stridor. He has wheezes. He has no rales. He exhibits no tenderness.   Labored breathing, tachypneic, wheezing throughout lungs and audible in room, breath sounds decreased at R base.   Abdominal: Soft. Bowel sounds are normal. He exhibits no distension. There is no tenderness.   Musculoskeletal: Normal range of motion. He exhibits no edema or tenderness.   Neurological: He is alert and oriented to person, place, and time.   Skin: Skin is warm and dry. No rash noted. He is not diaphoretic. No erythema.   Psychiatric: He has a normal mood and affect. His behavior is normal. Thought content normal.       Significant Labs:     Recent Results (from the past 24 hour(s))   CBC auto differential    Collection Time: 01/23/17  3:13 PM   Result Value Ref Range    WBC 12.80 (H) 3.90 - 12.70 K/uL    RBC 4.74 4.60 - 6.20 M/uL    Hemoglobin 12.5 (L) 14.0 - 18.0 g/dL    Hematocrit 38.3 (L) 40.0 - 54.0 %    MCV 81 (L) 82 - 98 fL    MCH 26.4 (L) 27.0 - 31.0 pg    MCHC 32.6 32.0 - 36.0 %    RDW 14.0 11.5 - 14.5 %    Platelets 159 150 - 350 K/uL    MPV 12.0 9.2 - 12.9 fL    Gran # 10.6 (H) 1.8 - 7.7 K/uL    Lymph # 1.2 1.0 - 4.8 K/uL    Mono # 1.0 0.3 - 1.0 K/uL    Eos # 0.0 0.0 - 0.5 K/uL    Baso # 0.01 0.00 - 0.20 K/uL    Gran% 82.5 (H) 38.0 - 73.0 %    Lymph% 9.3 (L) 18.0 - 48.0 %    Mono% 7.4 4.0 - 15.0 %    Eosinophil% 0.2 0.0 - 8.0 %    Basophil% 0.1 0.0 - 1.9 %    Differential Method Automated    Comprehensive metabolic panel    Collection Time: 01/23/17  3:13 PM   Result Value Ref Range    Sodium 137 136 - 145 mmol/L    Potassium 4.2 3.5 - 5.1 mmol/L     Chloride 104 95 - 110 mmol/L    CO2 21 (L) 23 - 29 mmol/L    Glucose 115 (H) 70 - 110 mg/dL    BUN, Bld 16 8 - 23 mg/dL    Creatinine 1.8 (H) 0.5 - 1.4 mg/dL    Calcium 7.5 (L) 8.7 - 10.5 mg/dL    Total Protein 7.5 6.0 - 8.4 g/dL    Albumin 3.4 (L) 3.5 - 5.2 g/dL    Total Bilirubin 0.6 0.1 - 1.0 mg/dL    Alkaline Phosphatase 76 55 - 135 U/L    AST 18 10 - 40 U/L    ALT 16 10 - 44 U/L    Anion Gap 12 8 - 16 mmol/L    eGFR if African American 45.6 (A) >60 mL/min/1.73 m^2    eGFR if non  39.5 (A) >60 mL/min/1.73 m^2   Protime-INR    Collection Time: 01/23/17  3:13 PM   Result Value Ref Range    Prothrombin Time 29.3 (H) 9.0 - 12.5 sec    INR 2.9 (H) 0.8 - 1.2   B-Type natriuretic peptide (BNP)    Collection Time: 01/23/17  3:13 PM   Result Value Ref Range     (H) 0 - 99 pg/mL   Sedimentation rate, manual    Collection Time: 01/23/17  3:13 PM   Result Value Ref Range    Sed Rate 68 (H) 0 - 10 mm/Hr   Troponin I    Collection Time: 01/23/17  3:25 PM   Result Value Ref Range    Troponin I 0.026 0.000 - 0.026 ng/mL   Troponin I    Collection Time: 01/23/17  5:41 PM   Result Value Ref Range    Troponin I 0.050 (H) 0.000 - 0.026 ng/mL   CK    Collection Time: 01/23/17  5:41 PM   Result Value Ref Range     (H) 20 - 200 U/L   C-reactive protein    Collection Time: 01/23/17  5:41 PM   Result Value Ref Range    .4 (H) 0.0 - 8.2 mg/L   Blood Culture #1 **CANNOT BE ORDERED STAT**    Collection Time: 01/23/17  7:18 PM   Result Value Ref Range    Blood Culture, Routine No Growth to date    Blood Culture #2 **CANNOT BE ORDERED STAT**    Collection Time: 01/23/17  7:25 PM   Result Value Ref Range    Blood Culture, Routine No Growth to date    Tacrolimus level    Collection Time: 01/23/17 11:35 PM   Result Value Ref Range    Tacrolimus Lvl 4.6 (L) 5.0 - 15.0 ng/mL   Troponin I    Collection Time: 01/23/17 11:35 PM   Result Value Ref Range    Troponin I 0.032 (H) 0.000 - 0.026 ng/mL   Influenza  antigen Nasopharyngeal Swab    Collection Time: 01/23/17 11:37 PM   Result Value Ref Range    Influenza A Ag, EIA Negative Negative    Influenza B Ag, EIA Negative Negative    Flu A & B Source Nasopharyngeal Swab    APTT    Collection Time: 01/24/17  1:01 AM   Result Value Ref Range    aPTT 33.3 (H) 21.0 - 32.0 sec   Urinalysis    Collection Time: 01/24/17  1:13 AM   Result Value Ref Range    Specimen UA Urine, Clean Catch     Color, UA Yellow Yellow, Straw, Lily    Appearance, UA Clear Clear    pH, UA 6.0 5.0 - 8.0    Specific Gravity, UA >=1.030 (A) 1.005 - 1.030    Protein, UA 1+ (A) Negative    Glucose, UA Negative Negative    Ketones, UA Negative Negative    Bilirubin (UA) Negative Negative    Occult Blood UA 1+ (A) Negative    Nitrite, UA Negative Negative    Urobilinogen, UA Negative <2.0 EU/dL    Leukocytes, UA Negative Negative   Urinalysis Microscopic    Collection Time: 01/24/17  1:13 AM   Result Value Ref Range    RBC, UA 1 0 - 4 /hpf    WBC, UA 1 0 - 5 /hpf    Bacteria, UA None None-Occ /hpf    Hyaline Casts, UA 0 0-1/lpf /lpf    Microscopic Comment SEE COMMENT    Phosphorus    Collection Time: 01/24/17  4:09 AM   Result Value Ref Range    Phosphorus 3.3 2.7 - 4.5 mg/dL   CBC with Automated Differential    Collection Time: 01/24/17  4:09 AM   Result Value Ref Range    WBC 16.74 (H) 3.90 - 12.70 K/uL    RBC 3.84 (L) 4.60 - 6.20 M/uL    Hemoglobin 9.9 (L) 14.0 - 18.0 g/dL    Hematocrit 29.8 (L) 40.0 - 54.0 %    MCV 78 (L) 82 - 98 fL    MCH 25.8 (L) 27.0 - 31.0 pg    MCHC 33.2 32.0 - 36.0 %    RDW 14.7 (H) 11.5 - 14.5 %    Platelets 138 (L) 150 - 350 K/uL    MPV SEE COMMENT 9.2 - 12.9 fL    Gran # 14.2 (H) 1.8 - 7.7 K/uL    Lymph # 1.2 1.0 - 4.8 K/uL    Mono # 1.2 (H) 0.3 - 1.0 K/uL    Eos # 0.0 0.0 - 0.5 K/uL    Baso # 0.01 0.00 - 0.20 K/uL    Gran% 84.7 (H) 38.0 - 73.0 %    Lymph% 7.2 (L) 18.0 - 48.0 %    Mono% 7.3 4.0 - 15.0 %    Eosinophil% 0.2 0.0 - 8.0 %    Basophil% 0.1 0.0 - 1.9 %    Aniso Slight      Poik Slight     Poly Occasional     Hypo Occasional     Ovalocytes Occasional     Qing Cells Occasional     Differential Method Automated    PT/INR    Collection Time: 01/24/17  4:09 AM   Result Value Ref Range    Prothrombin Time 45.8 (H) 9.0 - 12.5 sec    INR 4.5 (H) 0.8 - 1.2   Basic Metabolic Panel (BMP)    Collection Time: 01/24/17  4:09 AM   Result Value Ref Range    Sodium 131 (L) 136 - 145 mmol/L    Potassium 5.5 (H) 3.5 - 5.1 mmol/L    Chloride 104 95 - 110 mmol/L    CO2 16 (L) 23 - 29 mmol/L    Glucose 79 70 - 110 mg/dL    BUN, Bld 13 8 - 23 mg/dL    Creatinine 1.2 0.5 - 1.4 mg/dL    Calcium 6.6 (LL) 8.7 - 10.5 mg/dL    Anion Gap 11 8 - 16 mmol/L    eGFR if African American >60.0 >60 mL/min/1.73 m^2    eGFR if non African American >60.0 >60 mL/min/1.73 m^2   Tacrolimus level    Collection Time: 01/24/17  4:09 AM   Result Value Ref Range    Tacrolimus Lvl 5.7 5.0 - 15.0 ng/mL   Troponin I    Collection Time: 01/24/17  4:09 AM   Result Value Ref Range    Troponin I 0.028 (H) 0.000 - 0.026 ng/mL   Protein electrophoresis, serum    Collection Time: 01/24/17  9:46 AM   Result Value Ref Range    Protein, Serum 5.7 (L) 6.0 - 8.4 g/dL   Anti-Xa Heparin Monitoring    Collection Time: 01/24/17  9:46 AM   Result Value Ref Range    Heparin Anti-Xa 0.26 (L) 0.30 - 0.70 IU/mL   Renal function panel    Collection Time: 01/24/17  9:46 AM   Result Value Ref Range    Glucose 105 70 - 110 mg/dL    Sodium 134 (L) 136 - 145 mmol/L    Potassium 4.2 3.5 - 5.1 mmol/L    Chloride 103 95 - 110 mmol/L    CO2 22 (L) 23 - 29 mmol/L    BUN, Bld 16 8 - 23 mg/dL    Calcium 7.0 (L) 8.7 - 10.5 mg/dL    Creatinine 1.9 (H) 0.5 - 1.4 mg/dL    Albumin 2.7 (L) 3.5 - 5.2 g/dL    Phosphorus 4.5 2.7 - 4.5 mg/dL    eGFR if  42.7 (A) >60 mL/min/1.73 m^2    eGFR if non  37.0 (A) >60 mL/min/1.73 m^2    Anion Gap 9 8 - 16 mmol/L         Significant Imaging: I have reviewed and interpreted all pertinent imaging  results/findings within the past 24 hours.     CXR 1/24/2017    The cardiomediastinal silhouette is within normal limits.  Visualized airway is unremarkable.  There is no evidence of pneumothorax or large pleural effusion.  There is atelectatic change of the lower lung zones.  Overall, no significant interval change from prior study.    CT Chest non-coronary 1/23/2017    1.  No evidence of pulmonary thromboembolism to the level of the segmental arteries bilaterally.    2.  Dependent secretions in the trachea, suggesting potential aspiration.    3.  Coronary artery atherosclerosis.    4.  Bibasilar atelectasis.

## 2017-01-24 NOTE — ASSESSMENT & PLAN NOTE
-SIRS 3/4  -afebrile  -WBC 12.8 trending up since 1/21  -tachycardic, tachypneic  -no clear source, no pneumonia on CTA, patient received IV avelox once in ED.  -BCx drawn in ED, pending  -ordered UA and UCx

## 2017-01-24 NOTE — ASSESSMENT & PLAN NOTE
-troponins (mildly elevated) and EKG with new T-wave inversions for possible acute coronary syndrome, called cardiology, bedside echo in process  -CTA reveals no PE, pleural effusions, pneumonia.  Shows bibasilar atelectasis.  -BNP unremarkable, no signs of acute HF  -elevated ,   -appears to be musculoskeletal as patient states that pain is worsened by movement and appeared to have pain on palpation, possibly viral pleuritis.  -some concern for aortic dissection, hx of aortic atheroma, CTA PE study not adequate to rule out aortic dissection, BP equal in both arms, vascular surgery called, suggested BP control with ICU admission if truly concerning for aortic dissection.    -pain relieved with morphine in ED, morphine PRN  -ordered 1 time aspirin 325  -cardiac monitoring  -2d echo routine

## 2017-01-24 NOTE — ASSESSMENT & PLAN NOTE
-wheezing, improved with breathing treatments, continue duonebs q4H PRN  -satting at 94% on RA, 98% on 2L NC, will keep on NC for tonight

## 2017-01-24 NOTE — PROGRESS NOTES
Name: Ibrahima Phelps  Medical Record Number: [id number]  Date of Service: 01/24/2017  Note By: Brooks Chiu MD    RENAL TRANSPLANT INITIAL CONSULTATION NOTE    Reason for Consultation: immunosuppression management in patient with kidney transplant  Referring Provider: IM 3     History of Present Illness:  Ibrahima Phelps is a 61 y.o. male with history of ESRD requiring HD for 6 months prior to 1st kidney transplant (1992) which failed ~1996. He was back on RRT until undergoing second transplant / DDRT on 4/12/2005. He is on chronic IS with TAC, MMF, and prednisone. His baseline SCr is  ~ 1.5 - 1.8 mg/dL. He also has a history of HTN, CAD, Afib on anticoagulation with warfarin, HLD, and prior pericarditis. He is an active smoker as well. He came to the ED with chief complaints of substernal chest pain/discomfort with radiation to the back, worsened with inspiration and movements since last Saturday. Underwent CTA due to concerns of aortic dissection 1/23/17 (75 cc of Omnipaque 350 IV) but no evidence of the latter; seen by cardiology, ACS less likely (pain unresolved with SLNG, pain present for 3 days) and recommended therapy for pericarditis.       Past Medical History:  Past Medical History   Diagnosis Date    Atrial fibrillation     CAD (coronary artery disease)     Diverticulosis     Hyperlipidemia     Hypertension     Immunodeficiency due to treatment with immunosuppressive medication     Metabolic bone disease     Pericarditis     S/P kidney transplant     Thrombocytopenia     Thyroid disease     Unspecified disorder of kidney and ureter      Stage IIICKD       Past Surgical History:  Past Surgical History   Procedure Laterality Date    Cholecystectomy      Coronary angioplasty with stent placement  9/13/2006    Colonoscopy  April 20, 2011     Diverticulosis, repeat recommended in 3 yrs.    Cardioversion  04/30/15    Kidney transplant  2005    Parathyroidectomy         Family History:  Family  History   Problem Relation Age of Onset    No Known Problems Sister     No Known Problems Brother     Thyroid disease Mother      s/p surgery    Heart disease Father      had pacemaker    Heart disease Daughter      enlarged heart    No Known Problems Sister     Kidney failure Sister     Kidney disease Sister     No Known Problems Sister     Kidney disease Brother     Kidney failure Brother      s/p transplant    No Known Problems Brother     Diabetes Mellitus Neg Hx     Stroke Neg Hx     Heart attack Neg Hx     Cancer Neg Hx     Celiac disease Neg Hx     Cirrhosis Neg Hx     Colon cancer Neg Hx     Esophageal cancer Neg Hx     Inflammatory bowel disease Neg Hx     Rectal cancer Neg Hx     Stomach cancer Neg Hx     Ulcerative colitis Neg Hx     Liver disease Neg Hx     Liver cancer Neg Hx     Crohn's disease Neg Hx        Social History:  Social History     Social History    Marital status:      Spouse name: N/A    Number of children: N/A    Years of education: N/A     Occupational History    Not on file.     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    Alcohol use No    Drug use: No    Sexual activity: Yes     Partners: Female     Other Topics Concern    Not on file     Social History Narrative       Home Medications:     (Not in a hospital admission)    Inpatient Medications:  Scheduled Meds:   albuterol-ipratropium 2.5mg-0.5mg/3mL  3 mL Nebulization Q4H    aspirin  975 mg Oral QID    [START ON 1/25/2017] atorvastatin  40 mg Oral Daily    colchicine  0.6 mg Oral BID    doxazosin  6 mg Oral Daily    metoprolol tartrate  50 mg Oral BID    nicotine  1 patch Transdermal Daily    nifedipine  60 mg Oral Daily    pantoprazole  40 mg Oral Daily    [START ON  1/25/2017] predniSONE  50 mg Oral Daily    tacrolimus  3 mg Oral Daily    tacrolimus  4 mg Oral Daily       Continuous Infusions:       PRN Meds:.albuterol-ipratropium 2.5mg-0.5mg/3mL, dextrose 50%, dextrose 50%, glucagon (human recombinant), glucose, glucose, HYDROmorphone, ondansetron, ondansetron, ramelteon    Allergies:  Ace inhibitors; Povidone-iodine; and Verapamil    Review of Systems:  10 point review of systems was conducted and was negative except as mentioned in the HPI.    Physical Exam:  Vitals:  Vitals:    01/24/17 1202   BP: 124/65   Pulse: 81   Resp:    Temp:      Temp:  [98.8 °F (37.1 °C)-100.2 °F (37.9 °C)] 98.8 °F (37.1 °C)  Pulse:  [] 81  Resp:  [22-32] 25  SpO2:  [92 %-100 %] 93 %  BP: (111-190)/(58-86) 124/65    Exam  General: Mild distress due to chest pain.   HEENT: Normocephalic, atraumatic, EOM's intact bilaterally, moist mucous membranes, no oral ulcerations/lesions  Neck: Supple, symmetrical, trachea midline, no thyromegaly, no JVD  Respiratory: Clear to auscultation bilaterally, respirations unlabored, no rales/rhonchi/wheezing  Cardiovacular: Regular rate and rhythm, S1, S2 normal, KAILASH heard best over LSB  Gastrointestinal: Soft, non-tender, bowel sounds normal, no hepatosplenomegaly  Renal allograft exam: No tenderness, no bruits, normal exam  Musculoskeletal: No knee or ankle joint tenderness or swelling.   Extremities: No clubbing or cyanosis of bilateral upper extremities; no lower extremity edema bilaterally, radial pulses 2+ bilaterally, symmetric  Skin: warm and dry; no rash on exposed skin  Neurologic: CN grossly intact    Labs:  Lab Results   Component Value Date    WBC 16.74 (H) 01/24/2017    HGB 9.9 (L) 01/24/2017    HCT 29.8 (L) 01/24/2017     (L) 01/24/2017    K 4.2 01/24/2017     01/24/2017    CO2 22 (L) 01/24/2017    BUN 16 01/24/2017    CREATININE 1.9 (H) 01/24/2017    EGFRNONAA 37.0 (A) 01/24/2017    GLUCOSE 121 (H) 04/12/2005    CALCIUM 7.0 (L)  01/24/2017    PHOS 4.5 01/24/2017    MG 1.8 05/06/2016    ALBUMIN 2.7 (L) 01/24/2017    AST 18 01/23/2017    ALT 16 01/23/2017       Assessment/Plan:  61 y.o. male with:    # History of ESRD secondary to HTN s/p #2 DDRT on 4/12/2005:   - Baseline SCr ~1.5 - 1.8 mg/dL, currently around baseline. At risk of KATINA given iodinated contrast and now ASA use.   - Will recommend to use alternative to high dose ASA as feasible (DOUGLAS due to iodinated contrast and TAC will lead to renal vasoconstriction). Recommendations discussed with cardiology.   - Strict I/O's, daily weights.       # Immunosuppression Management:  - induction with THYMO and solumderol  - continue Prograf 4 mg am, 3 mg pm. Today's through level adequate. Will plan on holding tonight's dose to avoid further vasoconstriction. Will need to monitor for toxicities; goal [id number] trough level is 4 - 6.   - MMF on hold given concerns of infection. Was on 500 mg bid as outpatient.   - On high dose prednisone for pericarditis treatment; once finished, continue prednisone 5 mg daily.     # Anemia, acute on chronic: Hb currently in the 9 range; previously ~12g/dL.   - Consider iron studies.   - continue to monitor    # HTN: BPs stable  - continue to monitor    # Lytes:  - continue to monitor calcium, potassium, phos, and magnesium    Brooks Chiu M.D.   Transplant Nephrology Fellow   [phone]

## 2017-01-24 NOTE — ASSESSMENT & PLAN NOTE
-continue metoprolol, spironolactone, nifedipine, doxazosin  -hold lasix in setting of transplant kidney and contrast

## 2017-01-24 NOTE — ASSESSMENT & PLAN NOTE
-continue metoprolol, nifedipine  -1/23 INR 4.5, supratheraputic, hold warfarin for now   -continue to monitor INR daily

## 2017-01-24 NOTE — ASSESSMENT & PLAN NOTE
-transplant in 2005  -continue prograf, prednisone per pericarditis dosing   -daily prograf levels  -hold mycophenolate while admitted  -Cr 1.9, around baseline per previous measurements   -appreciate KMT recommendations

## 2017-01-24 NOTE — CONSULTS
INTERVENTIONAL CARDIOLOGY CONSULT      Referring Physician:  Christina Campbell MD  Indication: Chest pain     HPI:  Patient is a 61 y.o. male with CAD (prior DIEGO to LAD with Dr. Viveros in 2006), AF on coumadin, prior pericarditis, HLD, s/p kidney transplant 2005 and current smoker presents to the ER with a 3 day history of chest pain.     He was seen in [facility] ED Saturday for chest discomfort, shortness of breath and back pain. Reports he started having a dry cough that started about the same time that he started having chest discomfort. Reports pain is pleuritic, worse when he coughs or takes a deep breath. Pain does not change with positions. Pain does not worsen with exertion but reports his level of activity is minimal. Reports pain is different from his previous pain when he had his MI. He was working Friday and straining a bit more than usual with no difficulty but woke up Saturday morning with substernal chest discomfort, upper back pain and shortness of breath. He has not had much of an appetite since the symptoms started. He was discharged from the ED as his pain was felt to be non-cardiac in origin. He saw his PCP today with the same symptoms and was sent back to the ER. In the ER, he was given 325mg ASA and is currently still having chest pain when he takes deep breaths or coughs. He still has discomfort in his upper back.     Interventional cardiology consulted to evaluate for LHC.     Past Medical History   Diagnosis Date    Atrial fibrillation     CAD (coronary artery disease)     Diverticulosis     Hyperlipidemia     Hypertension     Immunodeficiency due to treatment with immunosuppressive medication     Metabolic bone disease     Pericarditis     S/P kidney transplant     Thrombocytopenia     Thyroid disease     Unspecified disorder of kidney and ureter      Stage IIICKD     Past Surgical History   Procedure Laterality Date    Cholecystectomy      Coronary angioplasty with stent placement   9/13/2006    Colonoscopy  April 20, 2011     Diverticulosis, repeat recommended in 3 yrs.    Cardioversion  04/30/15    Kidney transplant  2005    Parathyroidectomy       Family History   Problem Relation Age of Onset    No Known Problems Sister     No Known Problems Brother     Thyroid disease Mother      s/p surgery    Heart disease Father      had pacemaker    Heart disease Daughter      enlarged heart    No Known Problems Sister     Kidney failure Sister     Kidney disease Sister     No Known Problems Sister     Kidney disease Brother     Kidney failure Brother      s/p transplant    No Known Problems Brother     Diabetes Mellitus Neg Hx     Stroke Neg Hx     Heart attack Neg Hx     Cancer Neg Hx     Celiac disease Neg Hx     Cirrhosis Neg Hx     Colon cancer Neg Hx     Esophageal cancer Neg Hx     Inflammatory bowel disease Neg Hx     Rectal cancer Neg Hx     Stomach cancer Neg Hx     Ulcerative colitis Neg Hx     Liver disease Neg Hx     Liver cancer Neg Hx     Crohn's disease Neg Hx      Social History   Substance Use Topics    Smoking status: Current Every Day Smoker     Packs/day: 0.50     Years: 40.00     Types: Cigarettes    Smokeless tobacco: Never Used      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    Alcohol use No     Review of patient's allergies indicates:   Allergen Reactions    Ace inhibitors      Other reaction(s): Swelling    Povidone-iodine      Other reaction(s): Itching  Other reaction(s): Itching    Verapamil Other (See Comments)     Other reaction(s): Unknown      aspirin  81 mg Oral Daily    atorvastatin  80 mg Oral Daily    colchicine  0.6 mg Oral Daily    doxazosin  6 mg Oral Daily    metoprolol tartrate  50 mg Oral BID    morphine  1 mg Intravenous ED 1 Time    nicotine  1 patch Transdermal Daily    nifedipine  60 mg Oral Daily    pantoprazole  40 mg Oral Daily    predniSONE  5 mg Oral QAM     tacrolimus  3 mg Oral Daily    tacrolimus  4 mg Oral Daily     OBJECTIVE:   Physical Exam  General: Mild distress  Neuro: CN II - XII intact. No weakness or sensory loss.   HEENT: Visual fields and EOM intact, HIEU, no conjunctival injection, no pallor  Neck: No anterior or posterior lymphadenopathy. No JVD. No carotid bruits.   Cardiac: S1, S2, no murmurs, rubs, or gallops. Pulses 2+, regular. No evidence of pericardial friction rub.  Pulmonary: Diffuse wheezing in BL lungs  Abdominal: Soft, non-tender. No mass or hepatosplenomegaly. No rebound or rigidity. Bowel sounds present.   Extremities: No clubbing, cyanosis, or edema.   Skin: No bruising or rash    Labs:     Recent Labs  Lab 01/22/17  0405 01/23/17  1513 01/24/17  0409   WBC 12.30 12.80* 16.74*   HGB 13.0* 12.5* 9.9*   HCT 40.5 38.3* 29.8*    159 138*   LYMPH 9.9*  1.2 9.3*  1.2 7.2*  1.2   MONO 9.8  1.2* 7.4  1.0 7.3  1.2*   EOSINOPHIL 1.1 0.2 0.2         Recent Labs  Lab 01/21/17  1446 01/23/17  1513 01/24/17  0101 01/24/17  0409   APTT  --   --  33.3*  --    INR 2.6* 2.9*  --  4.5*        Recent Labs  Lab 01/21/17  1446 01/22/17  0405 01/23/17  1513 01/24/17  0409   * 113* 115* 79   CALCIUM 7.6* 7.5* 7.5* 6.6*   ALBUMIN 3.6 3.4* 3.4*  --    PROT 7.3 7.1 7.5  --     138 137 131*   K 4.2 3.7 4.2 5.5*   CO2 20* 22* 21* 16*    105 104 104   BUN 20 19 16 13   CREATININE 1.6* 1.6* 1.8* 1.2   ALKPHOS 86 78 76  --    ALT 21 18 16  --    AST 30 19 18  --    BILITOT 0.4 0.5 0.6  --    PHOS  --   --   --  3.3     Estimated Creatinine Clearance: 84.2 mL/min (based on Cr of 1.2).      Recent Labs  Lab 01/23/17  1513   *     IMAGING:   EKGs:  TWIs in inferior leads and lateral leads.    RODRIGO from 4/20/16  CONCLUSIONS     1 - Left atrium is enlarged.     2 - No visualized thrombus in the left atrial appendage.     3 - Trivial aortic regurgitation.     4 - Normal left ventricular systolic function (EF 60-65%).     5 - Trivial  mitral regurgitation.     6 - Mild tricuspid regurgitation.     7 - Grade 2 atheroma disease of aorta.     Prior Ischemic Evaluation:  Exercise Stress Echo (4/20/15)  CONCLUSIONS     1 - Normal left ventricular systolic function (EF 60-65%).     2 - Normal right ventricular systolic function .     3 - Grade I left ventricular diastolic dysfunction.     No echocardiographic evidence of stress induced myocardial ischemia.  Abnormal ECG response in the setting of a hypertensive response and with a mildly abnormal resting tracing    IMPRESSION:    Patient is a 61 y.o. male with CAD (prior DIEGO to LAD with Dr. Viveros in 2006), AF on coumadin, prior pericarditis, HLD, s/p kidney transplant 2005 and current smoker presents to the ER with a 3 day history of chest pain. Chest pain is pleuritic in nature and worsened with deep breathing and coughing. CRP is elevated. Troponin is minimally elevated and essentially flat., was mildly elevated when he presented a few days ago as well. Reports little relief with NTG, started on colchicine overnight by cardiology consult.   PLAN:   - Patient's sx and lab findings are most consistent with pericarditis rather than ACS  - Agree with continuing colchicine and checking 2D echo w/ CFD  - Would D/C' tx for ACS  - General cardiology following, please call with any questions or concerns.    Michael Sands MD, MPH  PGY-[id number]  Cardiovascular Disease Fellow

## 2017-01-24 NOTE — SUBJECTIVE & OBJECTIVE
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    Cardioversion  04/30/15    Kidney transplant  2005    Parathyroidectomy         Review of patient's allergies indicates:   Allergen Reactions    Ace inhibitors      Other reaction(s): Swelling    Povidone-iodine      Other reaction(s): Itching  Other reaction(s): Itching    Verapamil Other (See Comments)     Other reaction(s): Unknown       No current facility-administered medications on file prior to encounter.      Current Outpatient Prescriptions on File Prior to Encounter   Medication Sig    aspirin (ADULT ASPIRIN EC LOW STRENGTH) 81 MG EC tablet Take 1 tablet by mouth Daily.    atorvastatin (LIPITOR) 10 MG tablet Take 1 tablet (10 mg total) by mouth once daily.    calcium carbonate (TUMS) 200 mg calcium (500 mg) chewable tablet Take 4 tablets by mouth once daily.     CALCIUM CARBONATE/VITAMIN D3 (VITAMIN D-3 ORAL) Take 1,000 Units by mouth 2 (two) times daily.    cyclobenzaprine (FLEXERIL) 5 MG tablet Take 1 tablet (5 mg total) by mouth 3 (three) times daily as needed for Muscle spasms.    doxazosin (CARDURA) 4 MG tablet Take 1.5 tablets (6 mg total) by mouth 2 (two) times daily.    fexofenadine (ALLEGRA) 60 MG tablet Take 1 tablet by mouth Twice daily as needed.    fluticasone (FLONASE) 50 mcg/actuation nasal spray 1 spray by Each Nare route once daily.    furosemide (LASIX) 40  MG tablet Take 0.5 tablets (20 mg total) by mouth 2 (two) times daily.    hydrocodone-acetaminophen 5-325mg (NORCO) 5-325 mg per tablet Take 1 tablet by mouth every 4 (four) hours as needed.    metoprolol tartrate (LOPRESSOR) 50 MG tablet Take 1 tablet (50 mg total) by mouth 2 (two) times daily.    multivitamin (THERAGRAN) per tablet Take 1 tablet by mouth Daily.    mycophenolate (CELLCEPT) 250 mg Cap TAKE 2 CAPSULES BY MOUTH EVERY 12 HOURS TO PREVENT KIDNEY TRANSPLANT REJECTION    nifedipine (ADALAT CC) 60 MG TbSR TAKE 1 TABLET BY MOUTH TWICE DAILY.    NTS STEP 1 21 mg/24 hr PLACE 1 PATCH ONTO THE SKIN ONCE DAILY.    pantoprazole (PROTONIX) 40 MG tablet TAKE 1 TABLET BY MOUTH DAILY    paricalcitol (ZEMPLAR) 1 MCG capsule One po MWF    potassium chloride SA (K-DUR,KLOR-CON) 20 MEQ tablet TAKE 1 TABLET BY MOUTH DAILY    predniSONE (DELTASONE) 5 MG tablet TAKE 1 TABLET BY MOUTH EVERY MORNING    spironolactone (ALDACTONE) 25 MG tablet Take 1 tablet (25 mg total) by mouth once daily.    tacrolimus (PROGRAF) 1 MG Cap TAKE 4 CAPSULES BY MOUTH IN THE MORNING AND 3 CAPSULES IN THE EVENING    warfarin (COUMADIN) 5 MG tablet Take 1 tablet (5mg.) by mouth daily, except 1.5 (7.5mg) on Wednesday,  Or as directed by [facility]     Family History     Problem Relation (Age of Onset)    Heart disease Father, Daughter    Kidney disease Sister, Brother    Kidney failure Sister, Brother    No Known Problems Sister, Brother, Sister, Sister, Brother    Thyroid disease Mother        Social History Main Topics    Smoking status: Current Every Day Smoker     Packs/day: 0.50     Years: 40.00     Types: Cigarettes    Smokeless tobacco: Never Used      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    Alcohol use No    Drug use: No    Sexual activity: Yes     Partners: Female     Review of Systems   Constitutional: Positive for appetite change. Negative for activity change, chills  and fever.   HENT: Negative for sinus pressure, sneezing, sore throat and trouble swallowing.    Eyes: Negative for visual disturbance.   Respiratory: Positive for cough, shortness of breath and wheezing. Negative for choking and stridor.    Cardiovascular: Positive for chest pain. Negative for palpitations and leg swelling.   Gastrointestinal: Negative for abdominal distention, abdominal pain, constipation, diarrhea, nausea and vomiting.   Genitourinary: Negative for dysuria.   Musculoskeletal: Negative for back pain, myalgias, neck pain and neck stiffness.   Skin: Negative for color change, pallor, rash and wound.   Neurological: Negative for weakness, light-headedness, numbness and headaches.     Objective:     Vital Signs (Most Recent):  Temp: 99.7 °F (37.6 °C) (01/23/17 1440)  Pulse: 104 (01/23/17 2134)  Resp: (!) 32 (01/23/17 2126)  BP: (!) 178/74 (01/23/17 2134)  SpO2: 100 % (01/23/17 2134) Vital Signs (24h Range):  Temp:  [99.7 °F (37.6 °C)] 99.7 °F (37.6 °C)  Pulse:  [] 104  Resp:  [22-32] 32  SpO2:  [91 %-100 %] 100 %  BP: (139-190)/(68-86) 178/74     Weight: 113.4 kg (250 lb)  Body mass index is 32.98 kg/(m^2).    Physical Exam   Constitutional: He is oriented to person, place, and time. He appears well-developed and well-nourished. No distress.   HENT:   Head: Normocephalic and atraumatic.   Mouth/Throat: Oropharynx is clear and moist. No oropharyngeal exudate.   Eyes: Conjunctivae and EOM are normal. Pupils are equal, round, and reactive to light. No scleral icterus.   Neck: Normal range of motion. Neck supple. No JVD present. No tracheal deviation present.   Cardiovascular: Regular rhythm, normal heart sounds and intact distal pulses.  Exam reveals no gallop and no friction rub.    No murmur heard.  Tachycardic   Pulmonary/Chest: No stridor. He has wheezes. He has no rales. He exhibits no tenderness.   Labored breathing, tachypneic, wheezing throughout lungs and audible in room, breath sounds  "decreased at R base.   Abdominal: Soft. Bowel sounds are normal. He exhibits no distension. There is no tenderness.   Musculoskeletal: Normal range of motion. He exhibits no edema or tenderness.   On palpation of chest, patient said "ow" and jerked up, however denies pain changing with palpation.   Neurological: He is alert and oriented to person, place, and time.   Skin: Skin is warm and dry. No rash noted. He is not diaphoretic. No erythema.   Psychiatric: He has a normal mood and affect. His behavior is normal. Thought content normal.        Significant Labs:   Blood Culture: No results for input(s): LABBLOO in the last 48 hours.  BMP:   Recent Labs  Lab 01/23/17  1513   *      K 4.2      CO2 21*   BUN 16   CREATININE 1.8*   CALCIUM 7.5*     CBC:   Recent Labs  Lab 01/22/17  0405 01/23/17  1513   WBC 12.30 12.80*   HGB 13.0* 12.5*   HCT 40.5 38.3*    159     CMP:   Recent Labs  Lab 01/22/17  0405 01/23/17  1513    137   K 3.7 4.2    104   CO2 22* 21*   * 115*   BUN 19 16   CREATININE 1.6* 1.8*   CALCIUM 7.5* 7.5*   PROT 7.1 7.5   ALBUMIN 3.4* 3.4*   BILITOT 0.5 0.6   ALKPHOS 78 76   AST 19 18   ALT 18 16   ANIONGAP 11 12   EGFRNONAA 45.5* 39.5*     Cardiac Markers: No results for input(s): CKMB, TROPONINT, MYOGLOBIN in the last 48 hours.  Coagulation:   Recent Labs  Lab 01/23/17  1513   INR 2.9*     Lactic Acid: No results for input(s): LACTATE in the last 48 hours.  Lipase: No results for input(s): LIPASE in the last 48 hours.  Urine Studies: No results for input(s): COLORU, APPEARANCEUA, PHUR, SPECGRAV, PROTEINUA, GLUCUA, KETONESU, BILIRUBINUA, OCCULTUA, NITRITE, UROBILINOGEN, LEUKOCYTESUR, RBCUA, WBCUA, BACTERIA, SQUAMEPITHEL, HYALINECASTS in the last 48 hours.    Invalid input(s): WRIGHTSUR    Significant Imaging:   CT 1/23: 1.  No evidence of pulmonary thromboembolism to the level of the segmental arteries bilaterally.  2.  Dependent secretions in the trachea, " suggesting potential aspiration.  3.  Coronary artery atherosclerosis.  4.  Bibasilar atelectasis.  CXR 1/21: No acute intrathoracic process.  EKG: no ST elevations or depressions

## 2017-01-24 NOTE — MEDICAL/APP STUDENT
"Cardiology  Progress Note                                                              Team: [facility] [place], [place] HOSP MED 3     Patient Name: Ibrahima Phelps   YOB: 1955  Location: ED 10/10    Admit Date: 1/23/2017                     LOS: 1    SUBJECTIVE   Cardiology Consulted to assess chest discomfort    HPI:   " Patient is a 61 y.o. male with CAD (prior DIEGO to LAD with Dr. Viveros in 2006), AF on coumadin, prior pericarditis, HLD, s/p kidney transplant 2005 and current smoker presents for second time in two days for chest pain. He was seen in [facility] [place] [place] ED Saturday for chest discomfort, shortness of breath and back pain. He was working Friday and straining a bit more than usual with no difficulty but woke up Saturday morning with substernal chest discomfort, upper back pain and shortness of breath. The pain is pleuritic in nature but does not change with positions. He has not had much of an appetite since the symptoms started.. He was discharged from the ED as his pain was felt to be non-cardiac in origin. He saw his PCP today with the same symptoms and was sent back to the ER. ."      Primary Cardiologist:     INTERVAL HISTORY:   Pt reports pain has been constant for last 3 days, he has never felt this type of pain before, pain has not improved for worsened over this time, thought it is exacerbated by deep breaths.       REVIEW OF SYSTEMS:  General: No syncope, fatigue, fevers, chills, nausea, or vomiting  HEENT: No HAs; No change in vision or pallor  Cardiac: chest pain, not related to exertion, No angina, palpitations, orthopnea, or PND  Pulmonary: pain on respiration, no hemoptysis    MEDICATIONS:  Scheduled:   aspirin  81 mg Oral Daily    atorvastatin  80 mg Oral Daily    colchicine  0.6 mg Oral Daily    doxazosin  6 mg Oral Daily    metoprolol tartrate  50 mg Oral BID    nicotine  1 patch Transdermal Daily    nifedipine  60 mg Oral Daily    pantoprazole  40 mg Oral Daily    predniSONE  5 mg Oral QAM    " "tacrolimus  3 mg Oral Daily    tacrolimus  4 mg Oral Daily     Continuous:   heparin (porcine) in D5W 17 Units/kg/hr (01/24/17 0235)    lactated ringers 125 mL/hr at 01/23/17 2309     PRN:  albuterol-ipratropium 2.5mg-0.5mg/3mL, dextrose 50%, dextrose 50%, glucagon (human recombinant), glucose, glucose, heparin (PORCINE), heparin (PORCINE), morphine, ondansetron, ondansetron, ramelteon      OBJECTIVE:     VITALS  Most Recent Range (Last 24H)   Visit Vitals    BP (!) 173/81    Pulse 92    Temp 98.8 °F (37.1 °C)    Resp (!) 24    Ht 6' 1" (1.854 m)    Wt 113.4 kg (250 lb)    SpO2 99%  Comment: 4 L NC    BMI 32.98 kg/m2    Temp:  [98.8 °F (37.1 °C)-100.2 °F (37.9 °C)]   Pulse:  []   Resp:  [22-32]   BP: (111-190)/(58-86)   SpO2:  [91 %-100 %]      Intake and Output  BMI     Intake/Output Summary (Last 24 hours) at 01/24/17 0803  Last data filed at 01/23/17 2329   Gross per 24 hour   Intake                0 ml   Output              500 ml   Net             -500 ml       Net I/O since admission: Body mass index is 32.98 kg/(m^2).        PHYSICAL EXAM  General: AAOx3, pt in distress, labored breathing and wincing face  Neck:No JVD   Cardiac: RRR, no murmurs, no tenderness to palpation over chest    Pulmonary: expiritory wheeze diffusely  Extremities: no edema      LABS  CBC/Anemia Labs Coag Labs     Recent Labs  Lab 01/22/17  0405 01/23/17  1513 01/24/17  0409   WBC 12.30 12.80* 16.74*   HGB 13.0* 12.5* 9.9*   HCT 40.5 38.3* 29.8*   MCV 81* 81* 78*    159 138*    Lab Results   Component Value Date    INR 4.5 (H) 01/24/2017    INR 2.9 (H) 01/23/2017    INR 2.6 (H) 01/21/2017        BMP     Recent Labs  Lab 01/22/17  0405 01/23/17  1513 01/24/17  0409   * 115* 79    137 131*   K 3.7 4.2 5.5*    104 104   CO2 22* 21* 16*   BUN 19 16 13   CREATININE 1.6* 1.8* 1.2   CALCIUM 7.5* 7.5* 6.6*   albumin 3.4                    corected calcium 6.9   Mag & Phos LFTs     Recent Labs  Lab " 01/24/17  0409   PHOS 3.3      Recent Labs  Lab 01/21/17  1446 01/22/17  0405 01/23/17  1513   PROT 7.3 7.1 7.5   BILITOT 0.4 0.5 0.6   ALKPHOS 86 78 76   AST 30 19 18   ALT 21 18 16             Cardiac Enzymes Ejection Fractions/BNP     Recent Labs  Lab 01/23/17  1741 01/23/17  2335 01/24/17  0409   *  --   --    TROPONINI 0.050* 0.032* 0.028*    EF   Date Value Ref Range Status   04/30/2015 60 55 - 65    04/20/2015 65 55 - 65    02/12/2014 65         Recent Labs  Lab 01/21/17  1446 01/23/17  1513   * 126*        Lab Results   Component Value Date    HGBA1C 6.2 10/07/2016         Microbiology Results (last 7 days)     Procedure Component Value Units Date/Time    Blood Culture #1 **CANNOT BE ORDERED STAT** [[id number]] Collected:  01/23/17 1918    Order Status:  Completed Specimen:  Blood from Peripheral, Hand, Right Updated:  01/24/17 0315     Blood Culture, Routine No Growth to date    Blood Culture #2 **CANNOT BE ORDERED STAT** [[id number]] Collected:  01/23/17 1925    Order Status:  Completed Specimen:  Blood from Peripheral, Hand, Left Updated:  01/24/17 0315     Blood Culture, Routine No Growth to date    Urine culture [[id number]] Collected:  01/24/17 0113    Order Status:  Sent Specimen:  Urine from Urine, Clean Catch Updated:  01/24/17 0123          INVESTIGATION RESULTS    Imaging Results         X-Ray Chest 1 View (Final result) Result time:  01/24/17 07:23:45    Final result by Екатерина Gaspar MD (01/24/17 07:23:45)    Impression:      As above.      Electronically signed by: ЕКАТЕРИНА GASPAR  Date:     01/24/17  Time:    07:23     Narrative:    Chest AP portable    Indication:Shortness of breath.    Comparison:1/21/2017.    Findings:     The cardiomediastinal silhouette is within normal limits.  Visualized airway is unremarkable.  There is no evidence of pneumothorax or large pleural effusion.  There is atelectatic change of the lower lung zones.  Overall, no significant interval change from prior  study.            CTA Chest Non-Coronary (PE Study) (Final result) Result time:  01/23/17 17:51:31    Final result by Sean Carter MD (01/23/17 17:51:31)    Impression:      1.  No evidence of pulmonary thromboembolism to the level of the segmental arteries bilaterally.    2.  Dependent secretions in the trachea, suggesting potential aspiration.    3.  Coronary artery atherosclerosis.    4.  Bibasilar atelectasis.  ______________________________________     Electronically signed by resident: SEAN CARTER MD  Date:     01/23/17  Time:    17:46            As the supervising and teaching physician, I personally reviewed the images and resident's interpretation and I agree with the findings.          Electronically signed by: KATHYA CHOPRA MD  Date:     01/23/17  Time:    17:51     Narrative:    Technique: The chest was surveyed from the costophrenic angles through the lung apices at 3-mm increments after the administration of 75 cc of Omnipaque 350 intravenous contrast material according to the PE protocol.  Axial, sagittal and coronal maximum intensity projection images were reviewed.    Comparison:Radiograph 1/21/2017.    Findings:   The soft tissue vascular structures at the base of the neck appear unremarkable.    A left-sided aortic arch with 3 branch vessels is seen.  The thoracic aorta maintains normal caliber, contour, and course without significant atherosclerotic calcification throughout its course.  The heart is not enlarged.  No pericardial effusion is seen.  Coronary artery atherosclerosis is noted.    The pulmonary arteries distribute normally.  This study is satisfactory for the evaluation of pulmonary thromboembolism.  No evidence of an intraluminal filling defect is seen to the level of the segmental arteries bilaterally to suggest pulmonary thromboembolism.    The trachea and proximal airways are patent.  Dependent secretions are seen in the proximal trachea, suggesting potential aspiration.  The  lungs are symmetrically expanded.  Calcified granulomas are seen throughout the left upper lobe.  Bandlike opacities are noted at the bilateral lung bases, suggesting platelike atelectasis or scarring.  No mass, consolidation, significant pleural thickening, pneumothorax, or pleural effusion is seen.    No mediastinal or axillary lymph node enlargement is appreciated.    Limited views of the upper abdomen demonstrate no significant normality.    The osseous structures demonstrate no evidence of acute fracture or osseous destructive processes.                ASSESSMENT/PLAN:     Current Problems List:  Active Hospital Problems    Diagnosis  POA    *Chest pain [R07.9]  Yes    Sepsis [A41.9]  Yes    Elevated troponin [R79.89]  Yes    Atrial fibrillation with rapid ventricular response [I48.91]  Yes    SOB (shortness of breath) [R06.02]  Yes    CKD (chronic kidney disease) stage 3, GFR 30-59 ml/min [N18.3]  Yes    S/P kidney transplant [Z94.0]  Not Applicable    Hyperlipidemia [E78.5]  Yes    Hypertension [I10]  Yes    Immunodeficiency due to treatment with immunosuppressive medication [D89.9]  Yes      Resolved Hospital Problems    Diagnosis Date Resolved POA   No resolved problems to display.        ASSESSMENT:   61 y.o. year old male with    DDx:  PE, pericarditis, esophageal spasm, aortic dissection, costochondritis,     Recommendations:     Presumed Pericarditis  - Plavix 300mg once ( 300mg given earlier for a total of 600mg), Asprin 81mg  - prednisone 50 mg daily  - Solumedrol 125 mg IV once  - colchicine 0.6mg BID    Elevated troponin  - elevation is unlikely to be due to ACS due to minimal rise and when correlated with kidney function most probably due to decreased clearance.

## 2017-01-24 NOTE — ASSESSMENT & PLAN NOTE
-wheezing, improved with breathing treatments, continue duonebs q4H PRN  - likely 2/2 to pericarditis and pain, will continue NC oxygen and wean as tolerated

## 2017-01-24 NOTE — ASSESSMENT & PLAN NOTE
-troponins (mildly elevated) and EKG with new T-wave inversions for possible acute coronary syndrome, re-assessed and was determined to have pericarditis   - troponemia likely 2/2 from pericarditis with myocardial inflammation   -CTA reveals no PE, pleural effusions, pneumonia.  Shows bibasilar atelectasis.  -BNP unremarkable, no signs of acute HF  -elevated ,   - per cardiology recommendations, will do aspirin 1000 mg PO QID, predisone 50 mg PO daily, solumedrol 125 mg IV once, and colchicine 0.6 mg PO BID

## 2017-01-24 NOTE — CONSULTS
"Consult Note  Vascular Surgery    Consult Requested By: ED  Reason for Consult: Possible aortic dissection     SUBJECTIVE:     History of Present Illness:  Patient is a 61 y.o.  male with a PMhx of HTN, HLD, Afib (on Coumadin), Prior Kidney TXP (2005), and CAD s/p Coronary stent (2006) who presents with complaints of acute chest pain and shortness of breath. Patient states that the chest pain began two days ago, describes the pain as a chest pressure that radiates to the back. He also c/o new onset shortness of breath which he states is worse with activity and c/o coughing. Patient denies "tearing chest pain", denies abdominal pain, rest pain or claudication.     Upon evaluation in the ED the patient underwent labs noting elevated troponin and ST changes. He underwent a CTA PE protocol which demonstrated no evidence of PE or any evidence of aortic dissection. Cardiology was consulted for ACS and placed patient on heparin gtt. Vascular surgery is being consulted for evaluation of possible dissection despite CTA which is negative for evidence of dissection.     Scheduled Meds:   aspirin  81 mg Oral Daily    atorvastatin  80 mg Oral Daily    colchicine  0.6 mg Oral Daily    doxazosin  6 mg Oral Daily    metoprolol tartrate  50 mg Oral BID    nicotine  1 patch Transdermal Daily    nifedipine  60 mg Oral Daily    pantoprazole  40 mg Oral Daily    predniSONE  5 mg Oral QAM    tacrolimus  3 mg Oral Daily    tacrolimus  4 mg Oral Daily     Continuous Infusions:   heparin (porcine) in D5W 17 Units/kg/hr (01/24/17 0235)    lactated ringers 125 mL/hr at 01/23/17 2309     PRN Meds:albuterol-ipratropium 2.5mg-0.5mg/3mL, dextrose 50%, dextrose 50%, glucagon (human recombinant), glucose, glucose, heparin (PORCINE), heparin (PORCINE), morphine, ondansetron, ondansetron, ramelteon    Review of patient's allergies indicates:   Allergen Reactions    Ace inhibitors      Other reaction(s): Swelling    " Povidone-iodine      Other reaction(s): Itching  Other reaction(s): Itching    Verapamil Other (See Comments)     Other reaction(s): Unknown       Past Medical History   Diagnosis Date    Atrial fibrillation     CAD (coronary artery disease)     Diverticulosis     Hyperlipidemia     Hypertension     Immunodeficiency due to treatment with immunosuppressive medication     Metabolic bone disease     Pericarditis     S/P kidney transplant     Thrombocytopenia     Thyroid disease     Unspecified disorder of kidney and ureter      Stage IIICKD     Past Surgical History   Procedure Laterality Date    Cholecystectomy      Coronary angioplasty with stent placement  9/13/2006    Colonoscopy  April 20, 2011     Diverticulosis, repeat recommended in 3 yrs.    Cardioversion  04/30/15    Kidney transplant  2005    Parathyroidectomy       Family History   Problem Relation Age of Onset    No Known Problems Sister     No Known Problems Brother     Thyroid disease Mother      s/p surgery    Heart disease Father      had pacemaker    Heart disease Daughter      enlarged heart    No Known Problems Sister     Kidney failure Sister     Kidney disease Sister     No Known Problems Sister     Kidney disease Brother     Kidney failure Brother      s/p transplant    No Known Problems Brother     Diabetes Mellitus Neg Hx     Stroke Neg Hx     Heart attack Neg Hx     Cancer Neg Hx     Celiac disease Neg Hx     Cirrhosis Neg Hx     Colon cancer Neg Hx     Esophageal cancer Neg Hx     Inflammatory bowel disease Neg Hx     Rectal cancer Neg Hx     Stomach cancer Neg Hx     Ulcerative colitis Neg Hx     Liver disease Neg Hx     Liver cancer Neg Hx     Crohn's disease Neg Hx      Social History   Substance Use Topics    Smoking status: Current Every Day Smoker     Packs/day: 0.50     Years: 40.00     Types: Cigarettes    Smokeless tobacco: Never Used      Comment: 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 He is not exercising.    Alcohol use No        Review of Systems:  Peripheral Vascular: denies rest pain  Constitutional: no fever or chills  Eyes: no visual changes  Respiratory: + Cough, + Shortness of Breath + Dyspnea on Exertion x 2 days  Cardiovascular: + Chest pain on ED arrival (now resolved)  Gastrointestinal: no nausea or vomiting, negative for abdominal pain  Genitourinary: no hematuria or dysuria  Hematologic/Lymphatic: no easy bruising or lymphadenopathy  Musculoskeletal: no arthralgias or myalgias  Neurological: no seizures or tremors    OBJECTIVE:     Vital Signs (Most Recent)  Temp: 99.7 °F (37.6 °C) (01/23/17 1440)  Pulse: 104 (01/23/17 2134)  Resp: (!) 32 (01/23/17 2126)  BP: (!) 178/74 (01/23/17 2134)  SpO2: 100 % (01/23/17 2134)    Vital Signs Range (Last 24H):  Temp:  [99.7 °F (37.6 °C)-100.2 °F (37.9 °C)]   Pulse:  []   Resp:  [22-32]   BP: (111-190)/(58-86)   SpO2:  [91 %-100 %]     Physical Exam:  General: well developed, well nourished, mild distress  Head: normocephalic, atraumatic  Eyes:  PERRL, EOMI  Lungs:  labored breathing  Heart: + Afib  Abdomen: soft, obese abdomen, non-tender, non-distended, no evidence of AAA  Extremities: no cyanosis or edema, or clubbing and 2+ radial and femoral pulses b/l; nonpalpable right DP, 1+ right PT, 1+ left DP, nonpalpable left PT     Laboratory:  Recent Labs  Lab 01/23/17  1513   *      K 4.2      CO2 21*   BUN 16   CREATININE 1.8*   CALCIUM 7.5*      CBC:   Recent Labs  Lab 01/22/17  0405 01/23/17  1513   WBC 12.30 12.80*   HGB 13.0* 12.5*   HCT 40.5 38.3*    159      CMP:   Recent Labs  Lab 01/22/17  0405 01/23/17  1513    137   K 3.7 4.2    104   CO2 22* 21*   * 115*   BUN 19 16   CREATININE 1.6* 1.8*   CALCIUM 7.5* 7.5*   PROT 7.1 7.5   ALBUMIN 3.4* 3.4*   BILITOT 0.5 0.6   ALKPHOS 78 76   AST 19 18   ALT 18 16   ANIONGAP 11 12   EGFRNONAA 45.5* 39.5*         Diagnostic  Results:  Cta Chest Non-coronary (pe Study)  Result Date: 1/23/2017  Technique: The chest was surveyed from the costophrenic angles through the lung apices at 3-mm increments after the administration of 75 cc of Omnipaque 350 intravenous contrast material according to the PE protocol.  Axial, sagittal and coronal maximum intensity projection images were reviewed. Comparison:Radiograph 1/21/2017. Findings: The soft tissue vascular structures at the base of the neck appear unremarkable. A left-sided aortic arch with 3 branch vessels is seen.  The thoracic aorta maintains normal caliber, contour, and course without significant atherosclerotic calcification throughout its course.  The heart is not enlarged.  No pericardial effusion is seen.  Coronary artery atherosclerosis is noted. The pulmonary arteries distribute normally.  This study is satisfactory for the evaluation of pulmonary thromboembolism.  No evidence of an intraluminal filling defect is seen to the level of the segmental arteries bilaterally to suggest pulmonary thromboembolism. The trachea and proximal airways are patent.  Dependent secretions are seen in the proximal trachea, suggesting potential aspiration.  The lungs are symmetrically expanded.  Calcified granulomas are seen throughout the left upper lobe.  Bandlike opacities are noted at the bilateral lung bases, suggesting platelike atelectasis or scarring.  No mass, consolidation, significant pleural thickening, pneumothorax, or pleural effusion is seen. No mediastinal or axillary lymph node enlargement is appreciated. Limited views of the upper abdomen demonstrate no significant normality. The osseous structures demonstrate no evidence of acute fracture or osseous destructive processes.  1.  No evidence of pulmonary thromboembolism to the level of the segmental arteries bilaterally. 2.  Dependent secretions in the trachea, suggesting potential aspiration. 3.  Coronary artery atherosclerosis. 4.   Bibasilar atelectasis. ______________________________________ Electronically signed by resident: ADAIR ROONEY MD Date:     01/23/17 Time:    17:46 As the supervising and teaching physician, I personally reviewed the images and resident's interpretation and I agree with the findings. Electronically signed by: KATHYA CHOPRA MD Date:     01/23/17 Time:    17:51     X-ray Chest Ap Portable  Result Date: 1/21/2017  [id number] Accession # [id number] Study:  XR CHEST AP PORTABLE Indication: Chest Pain. Comparison: Chest radiograph from 04/30/2015. Findings: XR CHEST AP PORTABLE. Cardiac silhouette is normal in appearance. Pulmonary vasculature is within normal limits.  The lungs are well expanded.  Mild amount of atelectasis of the lower lung zones bilaterally.  A few granulomas in the left upper lung zone. No pleural effusion. Osseous structures demonstrate no significant abnormalities.   No acute intrathoracic process. Electronically signed by: KATHYA CHOPRA MD Date:     01/21/17 Time:    15:44       ASSESSMENT/PLAN:     61 y.o.  male with a PMhx of HTN, HLD, Afib (on Coumadin), Prior Kidney TXP (2005), and CAD s/p Coronary stent (2006) who presents with complaints of acute chest pain and shortness of breath.  - CTA reviewed noting no evidence of Aortic Dissection   - Patient with elevated troponins and inferior S-T changes consistent with ACS  - no acute vascular surgery intervention indicated at this time    Paulina Marc DO   Vascular Surgery Fellow  01/24/2017

## 2017-01-24 NOTE — ED NOTES
Pt resting quietly on stretcher; remains on continuous cardiac and pulse ox monitoring with non-invasive blood pressure to cycle every 30 minutes.  VS stable; NRS noted.  Pt replaced on 28% oxygen per Venturi mask.  Bed locked in lowest position; side rails up and locked x 2; call light, bedside table, and personal belongings within reach.  Pt denies needs or complaints at this time; will continue to monitor pt.

## 2017-01-24 NOTE — ED NOTES
Patient on stretcher, Bed placed in low/locked position, side rails up x 2, call light is within reach of patient and family. Patient placed into position of comfort. Patient in NAD and offers no complaints at this time. Will continue to monitor.

## 2017-01-24 NOTE — H&P
"[facility] Hospital Medicine  History & Physical    Patient Name: Ibrahima Phelps  MRN: [id number]  Admission Date: 1/23/2017  Attending Physician: Christina Campbell MD   Primary Care Provider: Scott Miranda Jr, MD    Hospital Medicine Team: [facility] MED 3 Harjinder Cantrell MD     Patient information was obtained from patient and ER records.     Subjective:     Principal Problem:Chest pain    Chief Complaint:  "chest soreness, hard to move my chest cavity"     HPI: Ibrahima Phelps is a 62yo male with PMH of HTN, HLD, CAD, pericarditis, afib, thyroid disease, s/p kidney transplant 2005, coronary angioplasty with stent 2006, cardioversion, admitted via ED for complaint of chest pain and SOB.  Patient describes the chest pain as a soreness and difficulty moving his chest cavity that started 2 days ago on the R lower parasternal region that spread to the L lower parasternal region as well.  He also states that the pain starts in his back between his scapula and radiates forward.  He denies recent heavy exercise, no trauma.  He reports that the pain is constantly present but waxes and wanes in intensity.  His pain worsens with movement, taking deep breaths, and coughing.  He has breathing treatments at home that has not relieved his pain or his SOB.  He has a history of pericarditis but states that the pericarditis pain is not the same as his current pain.  He denies having had this pain before.  He states that he has had loss of appetite in the past 2 days and attributes it to coughing.  Patient denies fever/chills, headache, sore throat, nausea, vomiting, abd pain, dysuria, diarrhea, weakness/numbness.    Patient was at [facility] ED the past two days for the same complaint.  On the first visit, his symptoms appeared to be musculoskeletal and he was discharged with flexeril which he states helped a little.  On the second visit he had mildly elevated troponin at 0.034 and cardiology stated he was ok for discharge.  " On 1/23 patient saw his PCP who sent the patient back to the ED.      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    Cardioversion  04/30/15    Kidney transplant  2005    Parathyroidectomy         Review of patient's allergies indicates:   Allergen Reactions    Ace inhibitors      Other reaction(s): Swelling    Povidone-iodine      Other reaction(s): Itching  Other reaction(s): Itching    Verapamil Other (See Comments)     Other reaction(s): Unknown       No current facility-administered medications on file prior to encounter.      Current Outpatient Prescriptions on File Prior to Encounter   Medication Sig    aspirin (ADULT ASPIRIN EC LOW STRENGTH) 81 MG EC tablet Take 1 tablet by mouth Daily.    atorvastatin (LIPITOR) 10 MG tablet Take 1 tablet (10 mg total) by mouth once daily.    calcium carbonate (TUMS) 200 mg calcium (500 mg) chewable tablet Take 4 tablets by mouth once daily.     CALCIUM CARBONATE/VITAMIN D3 (VITAMIN D-3 ORAL) Take 1,000 Units by mouth 2 (two) times daily.    cyclobenzaprine (FLEXERIL) 5 MG tablet Take 1 tablet (5 mg total) by mouth 3 (three) times daily as needed for Muscle spasms.    doxazosin (CARDURA) 4 MG tablet Take 1.5 tablets (6 mg total) by mouth 2 (two) times daily.    fexofenadine (ALLEGRA) 60 MG tablet Take 1 tablet by mouth Twice daily as needed.    fluticasone (FLONASE) 50 mcg/actuation nasal  spray 1 spray by Each Nare route once daily.    furosemide (LASIX) 40 MG tablet Take 0.5 tablets (20 mg total) by mouth 2 (two) times daily.    hydrocodone-acetaminophen 5-325mg (NORCO) 5-325 mg per tablet Take 1 tablet by mouth every 4 (four) hours as needed.    metoprolol tartrate (LOPRESSOR) 50 MG tablet Take 1 tablet (50 mg total) by mouth 2 (two) times daily.    multivitamin (THERAGRAN) per tablet Take 1 tablet by mouth Daily.    mycophenolate (CELLCEPT) 250 mg Cap TAKE 2 CAPSULES BY MOUTH EVERY 12 HOURS TO PREVENT KIDNEY TRANSPLANT REJECTION    nifedipine (ADALAT CC) 60 MG TbSR TAKE 1 TABLET BY MOUTH TWICE DAILY.    NTS STEP 1 21 mg/24 hr PLACE 1 PATCH ONTO THE SKIN ONCE DAILY.    pantoprazole (PROTONIX) 40 MG tablet TAKE 1 TABLET BY MOUTH DAILY    paricalcitol (ZEMPLAR) 1 MCG capsule One po MWF    potassium chloride SA (K-DUR,KLOR-CON) 20 MEQ tablet TAKE 1 TABLET BY MOUTH DAILY    predniSONE (DELTASONE) 5 MG tablet TAKE 1 TABLET BY MOUTH EVERY MORNING    spironolactone (ALDACTONE) 25 MG tablet Take 1 tablet (25 mg total) by mouth once daily.    tacrolimus (PROGRAF) 1 MG Cap TAKE 4 CAPSULES BY MOUTH IN THE MORNING AND 3 CAPSULES IN THE EVENING    warfarin (COUMADIN) 5 MG tablet Take 1 tablet (5mg.) by mouth daily, except 1.5 (7.5mg) on Wednesday,  Or as directed by [facility]mad[facility] Clinic     Family History     Problem Relation (Age of Onset)    Heart disease Father, Daughter    Kidney disease Sister, Brother    Kidney failure Sister, Brother    No Known Problems Sister, Brother, Sister, Sister, Brother    Thyroid disease Mother        Social History Main Topics    Smoking status: Current Every Day Smoker     Packs/day: 0.50     Years: 40.00     Types: Cigarettes    Smokeless tobacco: Never Used      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    Alcohol use No    Drug use: No    Sexual activity: Yes     Partners: Female     Review of Systems    Constitutional: Positive for appetite change. Negative for activity change, chills and fever.   HENT: Negative for sinus pressure, sneezing, sore throat and trouble swallowing.    Eyes: Negative for visual disturbance.   Respiratory: Positive for cough, shortness of breath and wheezing. Negative for choking and stridor.    Cardiovascular: Positive for chest pain. Negative for palpitations and leg swelling.   Gastrointestinal: Negative for abdominal distention, abdominal pain, constipation, diarrhea, nausea and vomiting.   Genitourinary: Negative for dysuria.   Musculoskeletal: Negative for back pain, myalgias, neck pain and neck stiffness.   Skin: Negative for color change, pallor, rash and wound.   Neurological: Negative for weakness, light-headedness, numbness and headaches.     Objective:     Vital Signs (Most Recent):  Temp: 99.7 °F (37.6 °C) (01/23/17 1440)  Pulse: 104 (01/23/17 2134)  Resp: (!) 32 (01/23/17 2126)  BP: (!) 178/74 (01/23/17 2134)  SpO2: 100 % (01/23/17 2134) Vital Signs (24h Range):  Temp:  [99.7 °F (37.6 °C)] 99.7 °F (37.6 °C)  Pulse:  [] 104  Resp:  [22-32] 32  SpO2:  [91 %-100 %] 100 %  BP: (139-190)/(68-86) 178/74     Weight: 113.4 kg (250 lb)  Body mass index is 32.98 kg/(m^2).    Physical Exam   Constitutional: He is oriented to person, place, and time. He appears well-developed and well-nourished. No distress.   HENT:   Head: Normocephalic and atraumatic.   Mouth/Throat: Oropharynx is clear and moist. No oropharyngeal exudate.   Eyes: Conjunctivae and EOM are normal. Pupils are equal, round, and reactive to light. No scleral icterus.   Neck: Normal range of motion. Neck supple. No JVD present. No tracheal deviation present.   Cardiovascular: Regular rhythm, normal heart sounds and intact distal pulses.  Exam reveals no gallop and no friction rub.    No murmur heard.  Tachycardic   Pulmonary/Chest: No stridor. He has wheezes. He has no rales. He exhibits no tenderness.   Labored  "breathing, tachypneic, wheezing throughout lungs and audible in room, breath sounds decreased at R base.   Abdominal: Soft. Bowel sounds are normal. He exhibits no distension. There is no tenderness.   Musculoskeletal: Normal range of motion. He exhibits no edema or tenderness.   On palpation of chest, patient said "ow" and jerked up, however denies pain changing with palpation.   Neurological: He is alert and oriented to person, place, and time.   Skin: Skin is warm and dry. No rash noted. He is not diaphoretic. No erythema.   Psychiatric: He has a normal mood and affect. His behavior is normal. Thought content normal.        Significant Labs:   Blood Culture: No results for input(s): LABBLOO in the last 48 hours.  BMP:   Recent Labs  Lab 01/23/17  1513   *      K 4.2      CO2 21*   BUN 16   CREATININE 1.8*   CALCIUM 7.5*     CBC:   Recent Labs  Lab 01/22/17  0405 01/23/17  1513   WBC 12.30 12.80*   HGB 13.0* 12.5*   HCT 40.5 38.3*    159     CMP:   Recent Labs  Lab 01/22/17  0405 01/23/17  1513    137   K 3.7 4.2    104   CO2 22* 21*   * 115*   BUN 19 16   CREATININE 1.6* 1.8*   CALCIUM 7.5* 7.5*   PROT 7.1 7.5   ALBUMIN 3.4* 3.4*   BILITOT 0.5 0.6   ALKPHOS 78 76   AST 19 18   ALT 18 16   ANIONGAP 11 12   EGFRNONAA 45.5* 39.5*     Cardiac Markers: No results for input(s): CKMB, TROPONINT, MYOGLOBIN in the last 48 hours.  Coagulation:   Recent Labs  Lab 01/23/17  1513   INR 2.9*     Lactic Acid: No results for input(s): LACTATE in the last 48 hours.  Lipase: No results for input(s): LIPASE in the last 48 hours.  Urine Studies: No results for input(s): COLORU, APPEARANCEUA, PHUR, SPECGRAV, PROTEINUA, GLUCUA, KETONESU, BILIRUBINUA, OCCULTUA, NITRITE, UROBILINOGEN, LEUKOCYTESUR, RBCUA, WBCUA, BACTERIA, SQUAMEPITHEL, HYALINECASTS in the last 48 hours.    Invalid input(s): UGO    Significant Imaging:   CT 1/23: 1.  No evidence of pulmonary thromboembolism to the level " of the segmental arteries bilaterally.  2.  Dependent secretions in the trachea, suggesting potential aspiration.  3.  Coronary artery atherosclerosis.  4.  Bibasilar atelectasis.  CXR 1/21: No acute intrathoracic process.  EKG: no ST elevations or depressions    Assessment/Plan:     * Chest pain  -troponins (mildly elevated) and EKG with new T-wave inversions for possible acute coronary syndrome, called cardiology, bedside echo in process  -CTA reveals no PE, pleural effusions, pneumonia.  Shows bibasilar atelectasis.  -BNP unremarkable, no signs of acute HF  -elevated ,   -appears to be musculoskeletal as patient states that pain is worsened by movement and appeared to have pain on palpation, possibly viral pleuritis.  -some concern for aortic dissection, hx of aortic atheroma, CTA PE study not adequate to rule out aortic dissection, BP equal in both arms, vascular surgery called, suggested BP control with ICU admission if truly concerning for aortic dissection.    -pain relieved with morphine in ED, morphine PRN  -ordered 1 time aspirin 325  -cardiac monitoring  -2d echo routine    Sepsis  -SIRS 3/4  -afebrile  -WBC 12.8 trending up since 1/21  -tachycardic, tachypneic  -no clear source, no pneumonia on CTA, patient received IV avelox once in ED.  -BCx drawn in ED, pending  -ordered UA and UCx    S/P kidney transplant  -transplant in 2005  -continue prograf, prednisone  -daily prograf levels  -hold mycophenolate while admitted  -Cr 1.8, appears to be baseline  -will inform KTM about patient in the morning  -continous IVF 125cc/hr due to patient having received contrast with PE study.    Hypertension  -continue metoprolol, spironolactone, nifedipine, doxazosin  -hold lasix in setting of transplant kidney and contrast    SOB (shortness of breath)  -wheezing, improved with breathing treatments, continue duonebs q4H PRN  -satting at 94% on RA, 98% on 2L NC, will keep on NC for tonight      Atrial  fibrillation with rapid ventricular response  -continue metoprolol, nifedipine  -1/23 INR 2.9  -continue to monitor INR    VTE Risk Mitigation         Ordered     Medium Risk of VTE  Once      01/23/17 2152     Place sequential compression device  Until discontinued      01/23/17 2152     Place RASHAD hose  Until discontinued      01/23/17 2152        Harjinder Cantrell MD  Department of Hospital Medicine   [facility]    I HAVE PERSONALLY TAKEN THE HISTORY, EXAMINED THIS PATIENT,  AND AGREE WITH THE RESIDENT'S NOTE AS STATED ABOVE WITH THE FOLLOWING EXCEPTIONS:  Patient seen and examined with the intern at 10:50pm on 1/23/17    Mr. Phelps is a 60yo man with a past medical history of renal xpl in 2005, atrial fibrillation, HTN, pericarditis, and CAD.  This is his 3rd visit to the ED for progressively worsening chest pain.  The pain is a constant sharp discomfort substernal below the nipple line.  The pain is worse with movement or inspiration.  The pain started in the morning of 1/21 and awoke him.  He denies any trauma.      Temp:  [99.7 °F (37.6 °C)]   Pulse:  []   Resp:  [22-32]   BP: (139-190)/(68-86)   SpO2:  [91 %-100 %]        Assessment and plan:  Chest pain radiating to back  -?Dissecting aortic aneurysm ?Pleuritis/Pericarditis ?D[place]ler ?Musculoskeletal ?Atypical lung infection ?ACS ?Occult PNA ?Myocarditis  -CTA was a good study for embolism only and negative for PTE  -Place onto telemetry  -Check 2D echo with cfd  -Check rapid flu screen  -I called the radiologist on call to discuss the CTA to see if it were adequate to rule out a dissection.  He reviewed it and felt it was NOT adequate to rule out dissection with the dye administration for PTE study.   -Will discuss with Cardiology to consider RODRIGO and will also check BP in each arm now and call Vascular surgery for evaluation    Abnormal EKG - LVH, flipped T's inferiorly  Elevated troponin level and mild elevation of CPK  -Though the  pain is atypical, given acute elevation of troponin will call cardiology for consult  - x 1 now  -Continue Beta blocker    SIRS  Shortness of breath  -Blood cultures  -Some wheezing, though negative CXR and CTA (except atelectasis) was negative for infiltrate or PTE    Kidney replaced by transplant  -KTM consult  -Given SIRS, hold mycophenolate x 24 hours  -Continue prednisone and Prograf  -Check UA    Essential hypertension  -Continue home medications    Microcytic anemia  -Fe studies    Atrial fibrillation  -Continue Lopressor   -Continue Coumadin and follow inr    Long term use of anticoagulation, Coumadin  -Continue Coumadin    EK2017 14:48:38 [facility]  Sinus rhythm with Premature supraventricular complexes  LVH with repolarization abnormality  Abnormal ECG  When compared with ECG of 2017 06:11,  Premature supraventricular complexes are now Present  Non-specific change in ST segment in Lateral leads  T wave inversion now evident in Inferior leads  T wave inversion less evident in Lateral leads  Confirmed by Melvina COTTO, Shamir ([id number]) on 2017 6:13:32 PM    Results for MARIA L SAUCEDO (MRN [id number]) as of 2017 22:58   Ref. Range 2017 14:46 2017 04:05 2017 15:13   Creatinine Latest Ref Range: 0.5 - 1.4 mg/dL 1.6 (H) 1.6 (H) 1.8 (H)   eGFR if non African American Latest Ref Range: >60 mL/min/1.73 m^2 45.5 (A) 45.5 (A) 39.5 (A)   eGFR if African American Latest Ref Range: >60 mL/min/1.73 m^2 52.6 (A) 52.6 (A) 45.6 (A)     Recent Results (from the past 24 hour(s))   CBC auto differential    Collection Time: 17  3:13 PM   Result Value Ref Range    WBC 12.80 (H) 3.90 - 12.70 K/uL    RBC 4.74 4.60 - 6.20 M/uL    Hemoglobin 12.5 (L) 14.0 - 18.0 g/dL    Hematocrit 38.3 (L) 40.0 - 54.0 %    MCV 81 (L) 82 - 98 fL    MCH 26.4 (L) 27.0 - 31.0 pg    MCHC 32.6 32.0 - 36.0 %    RDW 14.0 11.5 - 14.5 %    Platelets 159 150 - 350 K/uL    MPV 12.0 9.2 - 12.9 fL     Gran # 10.6 (H) 1.8 - 7.7 K/uL    Lymph # 1.2 1.0 - 4.8 K/uL    Mono # 1.0 0.3 - 1.0 K/uL    Eos # 0.0 0.0 - 0.5 K/uL    Baso # 0.01 0.00 - 0.20 K/uL    Gran% 82.5 (H) 38.0 - 73.0 %    Lymph% 9.3 (L) 18.0 - 48.0 %    Mono% 7.4 4.0 - 15.0 %    Eosinophil% 0.2 0.0 - 8.0 %    Basophil% 0.1 0.0 - 1.9 %    Differential Method Automated    Comprehensive metabolic panel    Collection Time: 01/23/17  3:13 PM   Result Value Ref Range    Sodium 137 136 - 145 mmol/L    Potassium 4.2 3.5 - 5.1 mmol/L    Chloride 104 95 - 110 mmol/L    CO2 21 (L) 23 - 29 mmol/L    Glucose 115 (H) 70 - 110 mg/dL    BUN, Bld 16 8 - 23 mg/dL    Creatinine 1.8 (H) 0.5 - 1.4 mg/dL    Calcium 7.5 (L) 8.7 - 10.5 mg/dL    Total Protein 7.5 6.0 - 8.4 g/dL    Albumin 3.4 (L) 3.5 - 5.2 g/dL    Total Bilirubin 0.6 0.1 - 1.0 mg/dL    Alkaline Phosphatase 76 55 - 135 U/L    AST 18 10 - 40 U/L    ALT 16 10 - 44 U/L    Anion Gap 12 8 - 16 mmol/L    eGFR if African American 45.6 (A) >60 mL/min/1.73 m^2    eGFR if non  39.5 (A) >60 mL/min/1.73 m^2   Protime-INR    Collection Time: 01/23/17  3:13 PM   Result Value Ref Range    Prothrombin Time 29.3 (H) 9.0 - 12.5 sec    INR 2.9 (H) 0.8 - 1.2   B-Type natriuretic peptide (BNP)    Collection Time: 01/23/17  3:13 PM   Result Value Ref Range     (H) 0 - 99 pg/mL   Troponin I    Collection Time: 01/23/17  3:25 PM   Result Value Ref Range    Troponin I 0.026 0.000 - 0.026 ng/mL   Troponin I    Collection Time: 01/23/17  5:41 PM   Result Value Ref Range    Troponin I 0.050 (H) 0.000 - 0.026 ng/mL   CK    Collection Time: 01/23/17  5:41 PM   Result Value Ref Range     (H) 20 - 200 U/L   C-reactive protein    Collection Time: 01/23/17  5:41 PM   Result Value Ref Range    .4 (H) 0.0 - 8.2 mg/L

## 2017-01-24 NOTE — ED NOTES
LOC: The patient is awake, alert, and oriented to place, time, situation. Affect is appropriate. Speech is appropriate and clear.       APPEARANCE: Patient resting comfortably in no acute distress. Patient is clean and well groomed.     SKIN: The skin is warm and dry; color consistent with ethnicity. Patient has normal skin turgor and moist mucus membranes. Skin intact; no breakdown or bruising noted.      MUSCULOSKELETAL: Patient moving upper and lower extremities without difficulty. Denies weakness.       RESPIRATORY: Airway is open and patent. Respirations spontaneous, labored. SOB reported. Tachypnic. accessory muscle use noted. Reports cough, auditory wheezing.      CARDIAC: Tachycardic. . No peripheral edema noted. No complaints of chest pain at this time.      ABDOMEN: Soft and non tender. No distention noted.       NEUROLOGIC: Eyes open spontaneously. Behavior appropriate to situation. Follows commands; facial expression symmetrical. Purposeful motor response noted; normal sensation in all extremities.

## 2017-01-24 NOTE — ED NOTES
Patient on stretcher, Bed placed in low/locked position, side rails up x 2, call light is within reach of patient and wife. Patient in NAD and offers no complaints at this time. Will continue to monitor.

## 2017-01-24 NOTE — CONSULTS
Cardiology Consult Note    Reason for Consultation: NSTEMI  Attending Requesting: Shannan  SUBJECTIVE:     History of Present Illness:  Patient is a 61 y.o. male with CAD (prior DIEGO to LAD with Dr. Viveros in 2006), AF on coumadin, prior pericarditis, HLD, s/p kidney transplant 2005 and current smoker presents for second time in two days for chest pain. He was seen in [facility] ED Saturday for chest discomfort, shortness of breath and back pain. He was working Friday and straining a bit more than usual with no difficulty but woke up Saturday morning with substernal chest discomfort, upper back pain and shortness of breath. The pain is pleuritic in nature but does not change with positions. He has not had much of an appetite since the symptoms started and also has not been very active because of the discomfort so he cannot discern whether the pain is worse with exertion. He was discharged from the ED as his pain was felt to be non-cardiac in origin. He saw his PCP today with the same symptoms and was sent back to the ER. In the ER, he was given 325mg ASA and is currently chest pain free. He still has discomfort in his upper back and is taking shallow breaths due to concern for the pain returning. Cardiology called to assess his chest discomfort.    Review of patient's allergies indicates:   Allergen Reactions    Ace inhibitors      Other reaction(s): Swelling    Povidone-iodine      Other reaction(s): Itching  Other reaction(s): Itching    Verapamil Other (See Comments)     Other reaction(s): Unknown       Past Medical History   Diagnosis Date    Atrial fibrillation     CAD (coronary artery disease)     Diverticulosis     Hyperlipidemia     Hypertension     Immunodeficiency due to treatment with immunosuppressive medication     Metabolic bone disease     Pericarditis     S/P kidney transplant     Thrombocytopenia     Thyroid disease     Unspecified disorder of kidney and ureter      Stage IIICKD     Past  Surgical History   Procedure Laterality Date    Cholecystectomy      Coronary angioplasty with stent placement  9/13/2006    Colonoscopy  April 20, 2011     Diverticulosis, repeat recommended in 3 yrs.    Cardioversion  04/30/15    Kidney transplant  2005    Parathyroidectomy       Family History   Problem Relation Age of Onset    No Known Problems Sister     No Known Problems Brother     Thyroid disease Mother      s/p surgery    Heart disease Father      had pacemaker    Heart disease Daughter      enlarged heart    No Known Problems Sister     Kidney failure Sister     Kidney disease Sister     No Known Problems Sister     Kidney disease Brother     Kidney failure Brother      s/p transplant    No Known Problems Brother     Diabetes Mellitus Neg Hx     Stroke Neg Hx     Heart attack Neg Hx     Cancer Neg Hx     Celiac disease Neg Hx     Cirrhosis Neg Hx     Colon cancer Neg Hx     Esophageal cancer Neg Hx     Inflammatory bowel disease Neg Hx     Rectal cancer Neg Hx     Stomach cancer Neg Hx     Ulcerative colitis Neg Hx     Liver disease Neg Hx     Liver cancer Neg Hx     Crohn's disease Neg Hx      Social History   Substance Use Topics    Smoking status: Current Every Day Smoker     Packs/day: 0.50     Years: 40.00     Types: Cigarettes    Smokeless tobacco: Never Used      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    Alcohol use No      Current Facility-Administered Medications   Medication Dose Route Frequency Provider Last Rate Last Dose    albuterol-ipratropium 2.5mg-0.5mg/3mL nebulizer solution 3 mL  3 mL Nebulization Q4H PRN Harjinder Cantrell MD        aspirin chewable tablet 81 mg  81 mg Oral Daily INDIGO Toro MD        atorvastatin tablet 10 mg  10 mg Oral Daily Blanca Sutton MD        dextrose 50% injection 12.5 g  12.5 g Intravenous PRN Harjinder Cantrell MD        dextrose 50% injection 25 g   25 g Intravenous PRN Harjinder Cantrell MD        doxazosin tablet 6 mg  6 mg Oral Daily Blanca ADayan Sutton MD        glucagon (human recombinant) injection 1 mg  1 mg Intramuscular PRN Harjinder Cantrell MD        glucose chewable tablet 16 g  16 g Oral PRN Harjinder Cantrell MD        glucose chewable tablet 24 g  24 g Oral PRN Harjinder Cantrell MD        heparin 25,000 units in dextrose 5% 250 mL (100 units/mL) bolus from bag; ADDITIONAL PRN BOLUS  30 Units/kg Intravenous PRN INDIGO Toro MD        heparin 25,000 units in dextrose 5% 250 mL (100 units/mL) bolus from bag; ADDITIONAL PRN BOLUS  15 Units/kg Intravenous PRN INDIGO Toro MD        heparin 25,000 units in dextrose 5% 250 mL (100 units/mL) bolus from bag; INITIAL BOLUS DOSE  70 Units/kg Intravenous Once INDIGO Toro MD        heparin 25,000 units in dextrose 5% 250 mL (100 units/mL) infusion  17 Units/kg/hr Intravenous Continuous INDIGO Toro MD        lactated ringers infusion   Intravenous Continuous Harjinder Cantrell  mL/hr at 01/23/17 2309      metoprolol tartrate (LOPRESSOR) tablet 50 mg  50 mg Oral BID Blanca WHITEHEAD. Chyna Richard MD   50 mg at 01/23/17 2024    morphine injection 4 mg  4 mg Intravenous Q4H PRN Blanca Sutton MD        nicotine 14 mg/24 hr 1 patch  1 patch Transdermal Daily Harjinder Cantrell MD        nifedipine 30 MG ORAL TR24 24 hr tablet 60 mg  60 mg Oral Daily Blanca ADayan Sutton MD        ondansetron disintegrating tablet 8 mg  8 mg Oral Q8H PRN Harjinder Cantrell MD        ondansetron injection 4 mg  4 mg Intravenous Q12H PRN Harjinder Cantrell MD        pantoprazole EC tablet 40 mg  40 mg Oral Daily Blanca ADayan Sutton MD        predniSONE tablet 5 mg  5 mg Oral QAM Harjinder Cantrell MD        ramelteon tablet 8 mg  8 mg Oral Nightly PRN Harjinder Cantrell MD        tacrolimus capsule 3 mg  3 mg Oral Daily Harjinder Cantrell MD   3 mg  at 01/23/17 2327    tacrolimus capsule 4 mg  4 mg Oral Daily Harjinder Cantrell MD         Current Outpatient Prescriptions   Medication Sig Dispense Refill    aspirin (ADULT ASPIRIN EC LOW STRENGTH) 81 MG EC tablet Take 1 tablet by mouth Daily.      atorvastatin (LIPITOR) 10 MG tablet Take 1 tablet (10 mg total) by mouth once daily. 90 tablet 3    calcium carbonate (TUMS) 200 mg calcium (500 mg) chewable tablet Take 4 tablets by mouth once daily.       CALCIUM CARBONATE/VITAMIN D3 (VITAMIN D-3 ORAL) Take 1,000 Units by mouth 2 (two) times daily.      cyclobenzaprine (FLEXERIL) 5 MG tablet Take 1 tablet (5 mg total) by mouth 3 (three) times daily as needed for Muscle spasms. 30 tablet 0    doxazosin (CARDURA) 4 MG tablet Take 1.5 tablets (6 mg total) by mouth 2 (two) times daily. 270 tablet 3    fexofenadine (ALLEGRA) 60 MG tablet Take 1 tablet by mouth Twice daily as needed.      fluticasone (FLONASE) 50 mcg/actuation nasal spray 1 spray by Each Nare route once daily. 16 g 6    furosemide (LASIX) 40 MG tablet Take 0.5 tablets (20 mg total) by mouth 2 (two) times daily. 180 tablet 2    hydrocodone-acetaminophen 5-325mg (NORCO) 5-325 mg per tablet Take 1 tablet by mouth every 4 (four) hours as needed. 10 tablet 0    metoprolol tartrate (LOPRESSOR) 50 MG tablet Take 1 tablet (50 mg total) by mouth 2 (two) times daily. 180 tablet 3    multivitamin (THERAGRAN) per tablet Take 1 tablet by mouth Daily.      mycophenolate (CELLCEPT) 250 mg Cap TAKE 2 CAPSULES BY MOUTH EVERY 12 HOURS TO PREVENT KIDNEY TRANSPLANT REJECTION 360 capsule 3    nifedipine (ADALAT CC) 60 MG TbSR TAKE 1 TABLET BY MOUTH TWICE DAILY. 180 tablet 1    NTS STEP 1 21 mg/24 hr PLACE 1 PATCH ONTO THE SKIN ONCE DAILY. 28 patch 6    pantoprazole (PROTONIX) 40 MG tablet TAKE 1 TABLET BY MOUTH DAILY 90 tablet 3    paricalcitol (ZEMPLAR) 1 MCG capsule One po MWF 90 capsule 3    potassium chloride SA (K-DUR,KLOR-CON) 20 MEQ tablet TAKE 1  TABLET BY MOUTH DAILY 90 tablet 3    predniSONE (DELTASONE) 5 MG tablet TAKE 1 TABLET BY MOUTH EVERY MORNING 90 tablet 3    spironolactone (ALDACTONE) 25 MG tablet Take 1 tablet (25 mg total) by mouth once daily. 90 tablet 3    tacrolimus (PROGRAF) 1 MG Cap TAKE 4 CAPSULES BY MOUTH IN THE MORNING AND 3 CAPSULES IN THE EVENING 630 capsule 4    warfarin (COUMADIN) 5 MG tablet Take 1 tablet (5mg.) by mouth daily, except 1.5 (7.5mg) on Wednesday,  Or as directed by [facility] 40 tablet 5     Review of Systems:  Gen: denies fever fatigue weight loss, +chills  HEENT: denies HA, blurry vision, tinnitus, dry mouth  Resp: denies cough, hemoptysis, +SOB  CV: Denies , palps, orthopnea, PND, edema, +CP  GI: denies abd pain, nausea/vomiting, diarrhea, melena, constipation, hematochezia  : denies dysuria, hematuria  MSK: denies arthralgia, myalgia, +back pain  Neuro: denies LOC, weakness, seizure, HA, sensory loss  Psych: denies depression, anxiety, AH/VH  Endo: denies heat or cold intolerance, goiter  Heme: denies bleeding, infection, petechiae       OBJECTIVE:     Vital Signs (Most Recent)  Temp: 99.7 °F (37.6 °C) (01/23/17 1440)  Pulse: 89 (01/23/17 2333)  Resp: (!) 32 (01/23/17 2126)  BP: (!) 166/72 (01/23/17 2333)  SpO2: 98 % (01/23/17 2333)    Vital Signs Range (Last 24H):  Temp:  [99.7 °F (37.6 °C)]   Pulse:  []   Resp:  [22-32]   BP: (118-190)/(58-86)   SpO2:  [91 %-100 %]     Physical Exam:  Gen: no acute distress, alert & oriented x 3  HEENT: normocephalic, atraumatic, extraocular movements intact, pupils equal round reactive to light, moist mucous membranes  Neck: no JVD, no carotid bruits  CV: regular rate and rhythm, normal S1/2, no murmurs, rubs, gallops  Resp: clear to auscultation bilaterally, slightly tachypneic  GI: soft, nontender nondistended, normal bowel sounds  Ext: warm well perfused, no edema, no clubbing or cyanosis      Laboratory:  Chemistry:  Lab Results   Component Value Date    NA  137 01/23/2017    K 4.2 01/23/2017     01/23/2017    CO2 21 (L) 01/23/2017    BUN 16 01/23/2017    CREATININE 1.8 (H) 01/23/2017    GLUCOSE 121 (H) 04/12/2005    CALCIUM 7.5 (L) 01/23/2017     (H) 01/23/2017     Cardiac Markers:  Lab Results   Component Value Date    TROPONINI 0.032 (H) 01/23/2017     Cardiac Markers (Last 3):  Lab Results   Component Value Date    TROPONINI 0.032 (H) 01/23/2017    TROPONINI 0.050 (H) 01/23/2017    TROPONINI 0.026 01/23/2017     CBC:   Lab Results   Component Value Date    WBC 12.80 (H) 01/23/2017    HGB 12.5 (L) 01/23/2017    HCT 38.3 (L) 01/23/2017    MCV 81 (L) 01/23/2017     01/23/2017     Lipids:  Lab Results   Component Value Date    CHOL 146 06/11/2015    TRIG 150 06/11/2015    HDL 36 (L) 06/11/2015     Coagulation:   Lab Results   Component Value Date    INR 2.9 (H) 01/23/2017    INR 2.3 01/06/2017    APTT 22.9 04/30/2015       Diagnostic Results:  EKG -- NSR with LVH and repol abnormalities, inferior TWI, lateral TWI more pronounced compared to prior EKGs    ASSESSMENT/PLAN:     Mr. Phelps is a 61 year old man with CAD s/p DIEGO to LAD 2006, prior KTx, AF on coumadin, HTN, HLD, current smoker and hsitory of pericarditis who presents with pleuritic chest discomfort most concerning for NSTEMI vs. Pericarditis. His chest pain is atypical but is associated with elevated troponin and ischemic changes on his EKG (most pronounced when comparing to EKG from 1/22 AM). He was given 325mg ASA in ED. Given his history, elevated troponin and EKG changes, I feel that an assessment from interventional cardiology is warranted with special attention of course given to his history of kidney transplant and Cr of 1.8. In the interim, renally dosed colchicine could be given to see whether this symptoms improve which would suggest this being purely pericarditis.      Plan:   --please keep NPO this morning  --load with ASA, Plavix 300mg  --hold coumadin, start heparin  gtt  --atorvastatin 80mg + continue home metoprolol 50mg BID  --interventional cardiology consult  --therapeutic trial of renally dosed colchicine  --2D echo with CFD    Thank you for this interesting consult. Further recommendations per attending addendum    Sd Reis MD  PGY-[phone])  Cardiology Fellow

## 2017-01-24 NOTE — ED NOTES
Report received. Pt resting comfortably on stretcher locked in lowest position, side rails upx2, call bell within reach. Family at bedisde. No needs at this time, will continue to monitor.

## 2017-01-24 NOTE — ED NOTES
Patient laying on stretcher, AAOX4. Patient is grunting with each breath and c/o pain in chest when he breathes. States he feels more SOB when he sits up.  VSS.  sats 99% on 4L NC, breathing at a rate of 24-26bpm currently.  Requesting more pain medication.  Patient informed that he is not due pain medication until around 0930. Will page Med team 3 regarding more pain medication and cellcept.  IV heparin infusing at a rate of 15 units/kg/hr and LR infusing at rate of 125ml/hr.  Side rails up X 1 per patient request.  Call bell in reach and wife at bedside.  Awaiting call back from med team 3.

## 2017-01-24 NOTE — ASSESSMENT & PLAN NOTE
-SIRS 3/4 on admission  -afebrile throughout stay   -WBC 12.8 trending up since 1/21  -tachycardic, tachypneic on admission  -no clear source, no pneumonia on CTA, patient received IV avelox once in ED  - no indication for infectious etiology at this time, likely from pericarditis  -BCx show NGTD times 1. Urine culture pending

## 2017-01-25 PROBLEM — N17.9 AKI (ACUTE KIDNEY INJURY): Status: ACTIVE | Noted: 2017-01-25

## 2017-01-25 LAB
ALBUMIN SERPL ELPH-MCNC: 2.79 G/DL
ALPHA1 GLOB SERPL ELPH-MCNC: 0.48 G/DL
ALPHA2 GLOB SERPL ELPH-MCNC: 0.95 G/DL
ANION GAP SERPL CALC-SCNC: 12 MMOL/L
ANION GAP SERPL CALC-SCNC: 13 MMOL/L
ANISOCYTOSIS BLD QL SMEAR: SLIGHT
B-GLOBULIN SERPL ELPH-MCNC: 0.74 G/DL
B2 MICROGLOB SERPL-MCNC: 4 UG/ML
BACTERIA UR CULT: NO GROWTH
BASOPHILS # BLD AUTO: 0 K/UL
BASOPHILS NFR BLD: 0 %
BUN SERPL-MCNC: 28 MG/DL
BUN SERPL-MCNC: 31 MG/DL
CALCIUM SERPL-MCNC: 7.1 MG/DL
CALCIUM SERPL-MCNC: 7.2 MG/DL
CHLORIDE SERPL-SCNC: 103 MMOL/L
CHLORIDE SERPL-SCNC: 105 MMOL/L
CO2 SERPL-SCNC: 19 MMOL/L
CO2 SERPL-SCNC: 20 MMOL/L
CREAT SERPL-MCNC: 2.2 MG/DL
CREAT SERPL-MCNC: 2.3 MG/DL
DIFFERENTIAL METHOD: ABNORMAL
EOSINOPHIL # BLD AUTO: 0 K/UL
EOSINOPHIL NFR BLD: 0 %
ERYTHROCYTE [DISTWIDTH] IN BLOOD BY AUTOMATED COUNT: 13.9 %
EST. GFR  (AFRICAN AMERICAN): 33.9 ML/MIN/1.73 M^2
EST. GFR  (AFRICAN AMERICAN): 35.8 ML/MIN/1.73 M^2
EST. GFR  (NON AFRICAN AMERICAN): 29.3 ML/MIN/1.73 M^2
EST. GFR  (NON AFRICAN AMERICAN): 31 ML/MIN/1.73 M^2
GAMMA GLOB SERPL ELPH-MCNC: 0.74 G/DL
GIANT PLATELETS BLD QL SMEAR: PRESENT
GLUCOSE SERPL-MCNC: 125 MG/DL
GLUCOSE SERPL-MCNC: 153 MG/DL
HCT VFR BLD AUTO: 32.8 %
HGB BLD-MCNC: 10.7 G/DL
INR PPP: 4.3
LYMPHOCYTES # BLD AUTO: 0.5 K/UL
LYMPHOCYTES NFR BLD: 2.8 %
MCH RBC QN AUTO: 25.4 PG
MCHC RBC AUTO-ENTMCNC: 32.6 %
MCV RBC AUTO: 78 FL
MONOCYTES # BLD AUTO: 1.2 K/UL
MONOCYTES NFR BLD: 6.5 %
NEUTROPHILS # BLD AUTO: 17 K/UL
NEUTROPHILS NFR BLD: 90.2 %
PLATELET # BLD AUTO: 179 K/UL
PLATELET BLD QL SMEAR: ABNORMAL
PMV BLD AUTO: 12.9 FL
POTASSIUM SERPL-SCNC: 4.1 MMOL/L
POTASSIUM SERPL-SCNC: 4.1 MMOL/L
PROT SERPL-MCNC: 5.7 G/DL
PROTHROMBIN TIME: 43.9 SEC
RBC # BLD AUTO: 4.21 M/UL
SODIUM SERPL-SCNC: 135 MMOL/L
SODIUM SERPL-SCNC: 137 MMOL/L
TACROLIMUS BLD-MCNC: 5.3 NG/ML
WBC # BLD AUTO: 18.8 K/UL

## 2017-01-25 PROCEDURE — 20600001 HC STEP DOWN PRIVATE ROOM

## 2017-01-25 PROCEDURE — 36415 COLL VENOUS BLD VENIPUNCTURE: CPT

## 2017-01-25 PROCEDURE — 63600175 PHARM REV CODE 636 W HCPCS: Performed by: INTERNAL MEDICINE

## 2017-01-25 PROCEDURE — 25000003 PHARM REV CODE 250: Performed by: INTERNAL MEDICINE

## 2017-01-25 PROCEDURE — 25000003 PHARM REV CODE 250

## 2017-01-25 PROCEDURE — 80197 ASSAY OF TACROLIMUS: CPT

## 2017-01-25 PROCEDURE — 85610 PROTHROMBIN TIME: CPT

## 2017-01-25 PROCEDURE — 63600175 PHARM REV CODE 636 W HCPCS: Performed by: STUDENT IN AN ORGANIZED HEALTH CARE EDUCATION/TRAINING PROGRAM

## 2017-01-25 PROCEDURE — 87040 BLOOD CULTURE FOR BACTERIA: CPT | Mod: 59

## 2017-01-25 PROCEDURE — 99232 SBSQ HOSP IP/OBS MODERATE 35: CPT | Mod: ,,, | Performed by: INTERNAL MEDICINE

## 2017-01-25 PROCEDURE — 80048 BASIC METABOLIC PNL TOTAL CA: CPT | Mod: 91

## 2017-01-25 PROCEDURE — 80048 BASIC METABOLIC PNL TOTAL CA: CPT

## 2017-01-25 PROCEDURE — 93010 ELECTROCARDIOGRAM REPORT: CPT | Mod: ,,, | Performed by: INTERNAL MEDICINE

## 2017-01-25 PROCEDURE — 99233 SBSQ HOSP IP/OBS HIGH 50: CPT | Mod: ,,, | Performed by: INTERNAL MEDICINE

## 2017-01-25 PROCEDURE — 83520 IMMUNOASSAY QUANT NOS NONAB: CPT

## 2017-01-25 PROCEDURE — 25000003 PHARM REV CODE 250: Performed by: EMERGENCY MEDICINE

## 2017-01-25 PROCEDURE — 85025 COMPLETE CBC W/AUTO DIFF WBC: CPT

## 2017-01-25 PROCEDURE — 93005 ELECTROCARDIOGRAM TRACING: CPT

## 2017-01-25 PROCEDURE — 25000003 PHARM REV CODE 250: Performed by: STUDENT IN AN ORGANIZED HEALTH CARE EDUCATION/TRAINING PROGRAM

## 2017-01-25 PROCEDURE — 82232 ASSAY OF BETA-2 PROTEIN: CPT

## 2017-01-25 PROCEDURE — 63600175 PHARM REV CODE 636 W HCPCS: Performed by: TRANSPLANT SURGERY

## 2017-01-25 RX ORDER — COLCHICINE 0.6 MG/1
0.6 TABLET, FILM COATED ORAL DAILY
Status: DISCONTINUED | OUTPATIENT
Start: 2017-01-26 | End: 2017-01-27 | Stop reason: HOSPADM

## 2017-01-25 RX ADMIN — ATORVASTATIN CALCIUM 40 MG: 20 TABLET, FILM COATED ORAL at 09:01

## 2017-01-25 RX ADMIN — TACROLIMUS 4 MG: 1 CAPSULE, GELATIN COATED ORAL at 09:01

## 2017-01-25 RX ADMIN — TACROLIMUS 3 MG: 1 CAPSULE, GELATIN COATED ORAL at 05:01

## 2017-01-25 RX ADMIN — DOXAZOSIN 6 MG: 4 TABLET ORAL at 09:01

## 2017-01-25 RX ADMIN — PREDNISONE 50 MG: 20 TABLET ORAL at 09:01

## 2017-01-25 RX ADMIN — VANCOMYCIN HYDROCHLORIDE 1500 MG: 1 INJECTION, POWDER, LYOPHILIZED, FOR SOLUTION INTRAVENOUS at 09:01

## 2017-01-25 RX ADMIN — RAMELTEON 8 MG: 8 TABLET, FILM COATED ORAL at 11:01

## 2017-01-25 RX ADMIN — SODIUM CHLORIDE 500 ML: 0.9 INJECTION, SOLUTION INTRAVENOUS at 02:01

## 2017-01-25 RX ADMIN — HYDROMORPHONE HYDROCHLORIDE 1 MG: 1 INJECTION, SOLUTION INTRAMUSCULAR; INTRAVENOUS; SUBCUTANEOUS at 11:01

## 2017-01-25 RX ADMIN — HYDROMORPHONE HYDROCHLORIDE 1 MG: 1 INJECTION, SOLUTION INTRAMUSCULAR; INTRAVENOUS; SUBCUTANEOUS at 10:01

## 2017-01-25 RX ADMIN — PANTOPRAZOLE SODIUM 40 MG: 40 TABLET, DELAYED RELEASE ORAL at 09:01

## 2017-01-25 RX ADMIN — NICOTINE 1 PATCH: 14 PATCH, EXTENDED RELEASE TRANSDERMAL at 09:01

## 2017-01-25 RX ADMIN — HYDROMORPHONE HYDROCHLORIDE 1 MG: 1 INJECTION, SOLUTION INTRAMUSCULAR; INTRAVENOUS; SUBCUTANEOUS at 05:01

## 2017-01-25 RX ADMIN — METOPROLOL TARTRATE 50 MG: 50 TABLET, FILM COATED ORAL at 10:01

## 2017-01-25 RX ADMIN — NIFEDIPINE 60 MG: 30 TABLET, FILM COATED, EXTENDED RELEASE ORAL at 09:01

## 2017-01-25 RX ADMIN — COLCHICINE 0.6 MG: 0.6 TABLET, FILM COATED ORAL at 09:01

## 2017-01-25 RX ADMIN — METOPROLOL TARTRATE 50 MG: 50 TABLET, FILM COATED ORAL at 09:01

## 2017-01-25 NOTE — ASSESSMENT & PLAN NOTE
-transplant in 2005  -continue prograf, prednisone per pericarditis dosing for now at 50mg but de-escalate likely tomorrow   -daily prograf levels  -hold mycophenolate while admitted and question for GPC bacteremia  -Cr 1.9 on admission today elevated as described in KATINA problem   -KMT following appreciate recommendations

## 2017-01-25 NOTE — PROGRESS NOTES
Cardiology Consult Note    Reason for Consultation: NSTEMI  Attending Requesting: Shannan  SUBJECTIVE:     History of Present Illness:  Patient is a 61 y.o. male with CAD (prior DIEGO to LAD with Dr. Viveros in 2006), AF on coumadin, prior pericarditis, HLD, s/p kidney transplant 2005 and current smoker presents for second time in two days for chest pain. He was seen in [facility] ED Saturday for chest discomfort, shortness of breath and back pain. He was working Friday and straining a bit more than usual with no difficulty but woke up Saturday morning with substernal chest discomfort, upper back pain and shortness of breath. Pain has not relented since its onset 3 days ago. Pain came on when he in bed, and is worsened with deep inspiration or positional changes such as sitting up in bed. This pain, again, does not relent, and then come back in a few hours with crushing substernal pain. Rather, this pain has been the same severity since its onset 3 days ago, constantly present, and sometimes radiating to the left side but mostly present in the chest and back.    He has not had much of an appetite since the symptoms started and also has not been very active because of the discomfort so he cannot discern whether the pain is worse with exertion. He was discharged from the ED as his pain was felt to be non-cardiac in origin. He saw his PCP with the same symptoms and was sent back to the ER. In the ER, he was given 325mg ASA. A trial of nitroglycerin sublingual did not significantly improve his chest pain ( given by cardiology at bedside). He still has significant discomfort in his chest and upper back.  Cardiology was consulted for evaluation of chest pain.     Specifically denies any recent fevers, malaise, sick contacts, fatigue, dizziness or syncope. He reports two prior episodes of pericarditis; he does not remember what year but notes it was after the kidney transplant. This pain is significantly different from the prior  episodes.     Review of patient's allergies indicates:   Allergen Reactions    Ace inhibitors      Other reaction(s): Swelling    Povidone-iodine      Other reaction(s): Itching  Other reaction(s): Itching    Verapamil Other (See Comments)     Other reaction(s): Unknown       Past Medical History   Diagnosis Date    Atrial fibrillation     CAD (coronary artery disease)     Diverticulosis     Hyperlipidemia     Hypertension     Immunodeficiency due to treatment with immunosuppressive medication     Metabolic bone disease     Pericarditis     S/P kidney transplant     Thrombocytopenia     Thyroid disease     Unspecified disorder of kidney and ureter      Stage IIICKD     Past Surgical History   Procedure Laterality Date    Cholecystectomy      Coronary angioplasty with stent placement  9/13/2006    Colonoscopy  April 20, 2011     Diverticulosis, repeat recommended in 3 yrs.    Cardioversion  04/30/15    Kidney transplant  2005    Parathyroidectomy       Family History   Problem Relation Age of Onset    No Known Problems Sister     No Known Problems Brother     Thyroid disease Mother      s/p surgery    Heart disease Father      had pacemaker    Heart disease Daughter      enlarged heart    No Known Problems Sister     Kidney failure Sister     Kidney disease Sister     No Known Problems Sister     Kidney disease Brother     Kidney failure Brother      s/p transplant    No Known Problems Brother     Diabetes Mellitus Neg Hx     Stroke Neg Hx     Heart attack Neg Hx     Cancer Neg Hx     Celiac disease Neg Hx     Cirrhosis Neg Hx     Colon cancer Neg Hx     Esophageal cancer Neg Hx     Inflammatory bowel disease Neg Hx     Rectal cancer Neg Hx     Stomach cancer Neg Hx     Ulcerative colitis Neg Hx     Liver disease Neg Hx     Liver cancer Neg Hx     Crohn's disease Neg Hx      Social History   Substance Use Topics    Smoking status: Current Every Day Smoker      Packs/day: 0.50     Years: 40.00     Types: Cigarettes    Smokeless tobacco: Never Used      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    Alcohol use No      Current Facility-Administered Medications   Medication Dose Route Frequency Provider Last Rate Last Dose    albuterol-ipratropium 2.5mg-0.5mg/3mL nebulizer solution 3 mL  3 mL Nebulization Q4H PRN Harjinder Cantrell MD   Stopped at 01/25/17 0756    atorvastatin tablet 40 mg  40 mg Oral Daily Jose Santacruz MD   40 mg at 01/25/17 0915    colchicine capsule/tablet 0.6 mg  0.6 mg Oral BID RUIZ To   0.6 mg at 01/25/17 0900    dextrose 50% injection 12.5 g  12.5 g Intravenous PRN Harjinder Cantrell MD        dextrose 50% injection 25 g  25 g Intravenous PRN Harjinder Cantrell MD        doxazosin tablet 6 mg  6 mg Oral Daily Blanca Sutton MD   6 mg at 01/25/17 0914    glucagon (human recombinant) injection 1 mg  1 mg Intramuscular PRN Harjinder Cantrell MD        glucose chewable tablet 16 g  16 g Oral PRN Harjinder Cantrell MD        glucose chewable tablet 24 g  24 g Oral PRN Harjinder Cantrell MD        hydromorphone (PF) injection 1 mg  1 mg Intravenous Q4H PRN RUIZ To   1 mg at 01/25/17 1127    influenza vaccine 60 mcg (15 mcg x 4)/0.5 mL (for patients > 36 months) injection  0.5 mL Intramuscular vaccine x 1 dose INDIGO Toro MD        metoprolol tartrate (LOPRESSOR) tablet 50 mg  50 mg Oral BID Blacna Sutton MD   50 mg at 01/25/17 0915    nicotine 14 mg/24 hr 1 patch  1 patch Transdermal Daily Harjinder Cantrell MD   1 patch at 01/25/17 0914    nifedipine 30 MG ORAL TR24 24 hr tablet 60 mg  60 mg Oral Daily lBanca Sutton MD   60 mg at 01/25/17 0915    ondansetron disintegrating tablet 8 mg  8 mg Oral Q8H PRN Harjinder Cantrell MD        ondansetron injection 4 mg  4 mg Intravenous Q12H PRN Harjinder De La Rosa  MD Óscar        pantoprazole EC tablet 40 mg  40 mg Oral Daily Blanca Sutton MD   40 mg at 01/25/17 0915    predniSONE tablet 50 mg  50 mg Oral Daily Christina Campbell MD   50 mg at 01/25/17 0915    ramelteon tablet 8 mg  8 mg Oral Nightly PRN Harjinder Cantrell MD        sodium chloride 0.9% bolus 500 mL  500 mL Intravenous Once RUIZ To        tacrolimus capsule 3 mg  3 mg Oral Daily Brooks Cardona MD        tacrolimus capsule 4 mg  4 mg Oral Daily Harjinder Cantrell MD   4 mg at 01/25/17 0914     Review of Systems:  Gen: denies fever fatigue weight loss, +chills  HEENT: denies HA, blurry vision, tinnitus, dry mouth  Resp: denies cough, hemoptysis, +SOB  CV: Denies , palps, orthopnea, PND, edema, +CP  GI: denies abd pain, nausea/vomiting, diarrhea, melena, constipation, hematochezia  : denies dysuria, hematuria  MSK: denies arthralgia, myalgia, +back pain  Neuro: denies LOC, weakness, seizure, HA, sensory loss  Psych: denies depression, anxiety, AH/VH  Endo: denies heat or cold intolerance, goiter  Heme: denies bleeding, infection, petechiae       OBJECTIVE:     Vital Signs (Most Recent)  Temp: 98 °F (36.7 °C) (01/25/17 1200)  Pulse: 89 (01/25/17 1200)  Resp: 20 (01/25/17 1200)  BP: 131/77 (01/25/17 1200)  SpO2: 95 % (01/25/17 1200)    Vital Signs Range (Last 24H):  Temp:  [97.8 °F (36.6 °C)-98.4 °F (36.9 °C)]   Pulse:  [67-96]   Resp:  [17-28]   BP: (127-155)/(57-86)   SpO2:  [88 %-96 %]     Physical Exam:  Gen: eating breakfast, in no severe distress   HEENT: normocephalic, atraumatic, extraocular movements intact, pupils equal round reactive to light, moist mucous membranes  Resp: no respiratory distress, no accessory mm of respiration used, no crackles/rhonchi/wheezing, CTAB    CV: regular rate and rhythm, normal S1/2, no murmurs, rubs, gallops, no S3/4 appreciated; no heaves or lifts noted; no precordial tenderness appreciated   Ext: warm well perfused, no  edema, no clubbing or cyanosis   Abdomen: soft, +BS, non-distended   Neuro: A&O x 3. Follows commands. No focal deficits noted.       Laboratory:  Chemistry:  Lab Results   Component Value Date     (L) 01/25/2017    K 4.1 01/25/2017     01/25/2017    CO2 20 (L) 01/25/2017    BUN 28 (H) 01/25/2017    CREATININE 2.3 (H) 01/25/2017    GLUCOSE 121 (H) 04/12/2005    CALCIUM 7.1 (L) 01/25/2017     (H) 01/23/2017     Cardiac Markers:  Lab Results   Component Value Date    TROPONINI 0.028 (H) 01/24/2017     Cardiac Markers (Last 3):  Lab Results   Component Value Date    TROPONINI 0.028 (H) 01/24/2017    TROPONINI 0.032 (H) 01/23/2017    TROPONINI 0.050 (H) 01/23/2017     CBC:   Lab Results   Component Value Date    WBC 18.80 (H) 01/25/2017    HGB 10.7 (L) 01/25/2017    HCT 32.8 (L) 01/25/2017    MCV 78 (L) 01/25/2017     01/25/2017     Lipids:  Lab Results   Component Value Date    CHOL 146 06/11/2015    TRIG 150 06/11/2015    HDL 36 (L) 06/11/2015     Coagulation:   Lab Results   Component Value Date    INR 4.3 (H) 01/25/2017    INR 2.3 01/06/2017    APTT 33.3 (H) 01/24/2017       Diagnostic Results:  EKG -- NSR with LVH and repol abnormalities, inferior TWI, lateral TWI more pronounced compared to prior EKGs    ASSESSMENT/PLAN:     Mr. Phelps is a 61 year old man with CAD s/p DIEGO to LAD 2006, hx of kidney transplant and post-transplant pericarditis presenting with unrelenting chest pain of 3 days duration. His exam and presentation is most consistent with acute pericarditis.         Recommendations:     1. Acute pericarditis   - prednisone 50 mg daily    - moderate dose, following by SLOW taper with decrements in prednisone if improving symptoms; -  - although steroids associated with recurrence of pericarditis, this is seen with treatment in INDEX case rather than in subsequent episodes; options are otherwise limited given pt cannot take NSAIDs due to nephrotoxicity     - colchicine 0.6 mg twice  daily for at least 3 months for treatment    - compliance is necessary; if gastrointestinal side effects, recommend sx treatment for side effects rather than discontinuing colchicine   - continue proton pump inhibitor   - f/u in cardiology clinic 1 month from now       Thank you for the consult. Discussed with attending Dr. Hein. Staff recommendations to follow.     Ulisses Millan MD   PGY1 Internal Medicine   [facility]   Pager [phone]

## 2017-01-25 NOTE — PLAN OF CARE
Problem: Patient Care Overview  Goal: Plan of Care Review  Outcome: Ongoing (interventions implemented as appropriate)  Plan of care reviewed with pt. VS stable, remains on 3.5lpm BNC. No acute events at this time. No complaints of SOB. Pt states pain controlled with PRN pain meds. Positive BC result called last night, MD notified, orders to repeat BCx2. Afebrile. Pt remains free from falls or injury. Bed low and locked, call light and personal belongings within reach. Will continue to monitor. Rebecca Valente RN

## 2017-01-25 NOTE — PROGRESS NOTES
[facility] Hospital Medicine  Progress Note    Patient Name: Ibrahima Phelps  MRN: [id number]  Patient Class: IP- Inpatient   Admission Date: 1/23/2017  Length of Stay: 2 days  Attending Physician: Christina Campbell MD  Primary Care Provider: Scott Miranda Jr, MD    Hospital Medicine Team: [facility] 3 RUIZ Siu    Subjective:     Principal Problem:Acute pericarditis    HPI:  Ibrahima Phelps is a 62yo male with PMH of HTN, HLD, CAD, pericarditis, afib, thyroid disease, s/p kidney transplant 2005, coronary angioplasty with stent 2006, cardioversion, admitted via ED for complaint of chest pain and SOB.  Patient describes the chest pain as a soreness and difficulty moving his chest cavity that started 2 days ago on the R lower parasternal region that spread to the L lower parasternal region as well.  He also states that the pain starts in his back between his scapula and radiates forward.  He denies recent heavy exercise, no trauma.  He reports that the pain is constantly present but waxes and wanes in intensity.  His pain worsens with movement, taking deep breaths, and coughing.  He has breathing treatments at home that has not relieved his pain or his SOB.  He has a history of pericarditis but states that the pericarditis pain is not the same as his current pain.  He denies having had this pain before.  He states that he has had loss of appetite in the past 2 days and attributes it to coughing.  Patient denies fever/chills, headache, sore throat, nausea, vomiting, abd pain, dysuria, diarrhea, weakness/numbness.    Patient was at [facility] [place] the past two days for the same complaint.  On the first visit, his symptoms appeared to be musculoskeletal and he was discharged with flexeril which he states helped a little.  On the second visit he had mildly elevated troponin at 0.034 and cardiology stated he was ok for discharge.  On 1/23 patient saw his PCP who sent the patient back to the  ED.      Hospital Course:       Interval History:   CORNELIA. Notes that his chest pain resolved and now only has left back pain. No SOB or palpitations.    He is frustrated with his care because of being started on vancomycin because is might hurt his transplanted kidney.     Review of Systems   Denies any fevers or chills. He denies any abdominal pain, n/v. No lower ext swelling or pain.    Objective:     Vital Signs (Most Recent):  Temp: 98 °F (36.7 °C) (01/25/17 1200)  Pulse: 89 (01/25/17 1200)  Resp: 20 (01/25/17 1200)  BP: 131/77 (01/25/17 1200)  SpO2: 95 % (01/25/17 1200) Vital Signs (24h Range):  Temp:  [97.8 °F (36.6 °C)-98.4 °F (36.9 °C)] 98 °F (36.7 °C)  Pulse:  [67-96] 89  Resp:  [17-28] 20  SpO2:  [88 %-96 %] 95 %  BP: (127-155)/(57-86) 131/77     Weight: 117.6 kg (259 lb 4.2 oz)  Body mass index is 34.21 kg/(m^2).    Intake/Output Summary (Last 24 hours) at 01/25/17 1439  Last data filed at 01/25/17 1127   Gross per 24 hour   Intake              433 ml   Output             1000 ml   Net             -567 ml      Physical Exam  General: well developed, well nourished, NAD  Neck: supple, symmetrical, trachea midline, no JVD  Cardiovascular: Heart: regular rate and rhythm, S1, S2 normal, no murmur, click, no rub or gallop.  Lungs: clear to auscultation bilaterally and normal respiratory effort  Chest Wall: no tenderness  Abdomen/Rectal: Abdomen: Non distended. Soft and lax.  Extremities: no edema, redness or tenderness in the calves or thighs. Pulses: 2+ and symmetric  Skin: Skin color, texture, turgor normal. No rashes or lesions  Musculoskeletal: full range of motion of joints  Lymph Nodes: No cervical or supraclavicular adenopathy  Psych/Behavioral: Normal. Alert and oriented, appropriate affect.    Significant Labs:   Blood Culture:   Recent Labs  Lab 01/23/17 1918 01/23/17 1925   LABBLOO No Growth to date  No Growth to date Gram stain aer bottle: Gram positive cocci in clusters resembling Staph   Results called to and read back by:Rebecca Valente RN 01/24/2017  22:17     CBC:   Recent Labs  Lab 01/24/17  0409 01/24/17  1430 01/25/17  0559   WBC 16.74* 19.23* 18.80*   HGB 9.9* 11.4* 10.7*   HCT 29.8* 34.7* 32.8*   * 154 179     CMP:   Recent Labs  Lab 01/23/17  1513 01/24/17  0409 01/24/17  0946 01/25/17  0559    131* 134* 135*   K 4.2 5.5* 4.2 4.1    104 103 103   CO2 21* 16* 22* 20*   * 79 105 125*   BUN 16 13 16 28*   CREATININE 1.8* 1.2 1.9* 2.3*   CALCIUM 7.5* 6.6* 7.0* 7.1*   PROT 7.5  --   --   --    ALBUMIN 3.4*  --  2.7*  --    BILITOT 0.6  --   --   --    ALKPHOS 76  --   --   --    AST 18  --   --   --    ALT 16  --   --   --    ANIONGAP 12 11 9 12   EGFRNONAA 39.5* >60.0 37.0* 29.3*         Assessment/Plan:      * Acute pericarditis  -troponins (mildly elevated) and EKG with new T-wave inversions for possible acute coronary syndrome, re-assessed and was determined to have pericarditis by cardiology who deferred C. EKG with no signs of pericarditis. Echo and CT scan with no significant pericardial fluid.   - pain much improved after receiving colchicine. Continue daily per renal dosing.  - troponemia likely 2/2 from pericarditis with myocardial inflammation that has down trended.  -CTA reveals no PE, pleural effusions, pericardial effusion, or pneumonia.  Shows bibasilar atelectasis.  -BNP unremarkable, no signs of acute HF  -elevated ,       S/P kidney transplant  -transplant in 2005  -continue prograf, prednisone per pericarditis dosing for now at 50mg but de-escalate likely tomorrow   -daily prograf levels  -hold mycophenolate while admitted and question for GPC bacteremia  -Cr 1.9 on admission today elevated as described in KATINA problem   -KMT following appreciate recommendations     KATINA (acute kidney injury)  Likely secondary to high dose ASA and contras induce nephropathy from CTA done on admission to rule out aortic dissection      Immunodeficiency due  to treatment with immunosuppressive medication  Continue monitoring for infection now on increased dosing of prednisone, holding MMF       Hypertension  -continue metoprolol, nifedipine, doxazosin, hold spironolactone for concurrent KATINA  -hold lasix in setting of transplant kidney and contrast induced nephropathy     Sepsis  -SIRS 3/4 on admission  -afebrile throughout stay   -WBC 12.8 and has been up trending up since 1/21today 18 from 19 with turn around in previous rising trajectory   -tachycardic, tachypneic on admission now resolved   -no clear source, no pneumonia on CTA, patient received IV avelox once in ED  - no indication for infectious etiology at this time, likely from pericarditis  - BCx showed GPC on admission suspicious for contamination but started on vancomycin today over concern for immunosuppressed state, repeat cultures sent today, obtaining vanc random tomorrow and following cultures with hope to stop with concurrent KATINA and transplanted status    CKD (chronic kidney disease) stage 3, GFR 30-59 ml/min  CKD III on transplanted status and now KATINA on top  nephro and cardiac diet  Hold ASA       Atrial fibrillation with rapid ventricular response  -continue metoprolol for rate control   -1/23 INR 4.5, supratheraputic, continue holding warfarin for now   -continue to monitor INR daily     Hyperlipidemia  Decreasing lipitor to 40mg daily      SOB (shortness of breath)  -wheezing, improved with breathing treatments, continue duonebs q4H PRN  - likely 2/2 to pericarditis and pain, will continue NC oxygen and wean as tolerated        VTE Risk Mitigation         Ordered     Medium Risk of VTE  Once      01/23/17 2152     Place sequential compression device  Until discontinued      01/23/17 2152     Place RASHAD hose  Until discontinued      01/23/17 2152          RUIZ Siu  Department of Hospital Medicine   [facility]

## 2017-01-25 NOTE — ASSESSMENT & PLAN NOTE
Likely secondary to high dose ASA and contras induce nephropathy from CTA done on admission to rule out aortic dissection

## 2017-01-25 NOTE — ASSESSMENT & PLAN NOTE
-continue metoprolol, nifedipine, doxazosin, hold spironolactone for concurrent KATINA  -hold lasix in setting of transplant kidney and contrast induced nephropathy

## 2017-01-25 NOTE — SUBJECTIVE & OBJECTIVE
Interval History:   CRISTOPHEREON. Notes that his chest pain resolved and now only has left back pain. No SOB or palpitations.    He is frustrated with his care because of being started on vancomycin because is might hurt his transplanted kidney.     Review of Systems   Denies any fevers or chills. He denies any abdominal pain, n/v. No lower ext swelling or pain.    Objective:     Vital Signs (Most Recent):  Temp: 98 °F (36.7 °C) (01/25/17 1200)  Pulse: 89 (01/25/17 1200)  Resp: 20 (01/25/17 1200)  BP: 131/77 (01/25/17 1200)  SpO2: 95 % (01/25/17 1200) Vital Signs (24h Range):  Temp:  [97.8 °F (36.6 °C)-98.4 °F (36.9 °C)] 98 °F (36.7 °C)  Pulse:  [67-96] 89  Resp:  [17-28] 20  SpO2:  [88 %-96 %] 95 %  BP: (127-155)/(57-86) 131/77     Weight: 117.6 kg (259 lb 4.2 oz)  Body mass index is 34.21 kg/(m^2).    Intake/Output Summary (Last 24 hours) at 01/25/17 1439  Last data filed at 01/25/17 1127   Gross per 24 hour   Intake              433 ml   Output             1000 ml   Net             -567 ml      Physical Exam  General: well developed, well nourished, NAD  Neck: supple, symmetrical, trachea midline, no JVD  Cardiovascular: Heart: regular rate and rhythm, S1, S2 normal, no murmur, click, no rub or gallop.  Lungs: clear to auscultation bilaterally and normal respiratory effort  Chest Wall: no tenderness  Abdomen/Rectal: Abdomen: Non distended. Soft and lax.  Extremities: no edema, redness or tenderness in the calves or thighs. Pulses: 2+ and symmetric  Skin: Skin color, texture, turgor normal. No rashes or lesions  Musculoskeletal: full range of motion of joints  Lymph Nodes: No cervical or supraclavicular adenopathy  Psych/Behavioral: Normal. Alert and oriented, appropriate affect.    Significant Labs:   Blood Culture:   Recent Labs  Lab 01/23/17 1918 01/23/17 1925   LABBLOO No Growth to date  No Growth to date Gram stain aer bottle: Gram positive cocci in clusters resembling Staph  Results called to and read back  by:Rebecca Valente RN 01/24/2017  22:17     CBC:   Recent Labs  Lab 01/24/17  0409 01/24/17  1430 01/25/17  0559   WBC 16.74* 19.23* 18.80*   HGB 9.9* 11.4* 10.7*   HCT 29.8* 34.7* 32.8*   * 154 179     CMP:   Recent Labs  Lab 01/23/17  1513 01/24/17  0409 01/24/17  0946 01/25/17  0559    131* 134* 135*   K 4.2 5.5* 4.2 4.1    104 103 103   CO2 21* 16* 22* 20*   * 79 105 125*   BUN 16 13 16 28*   CREATININE 1.8* 1.2 1.9* 2.3*   CALCIUM 7.5* 6.6* 7.0* 7.1*   PROT 7.5  --   --   --    ALBUMIN 3.4*  --  2.7*  --    BILITOT 0.6  --   --   --    ALKPHOS 76  --   --   --    AST 18  --   --   --    ALT 16  --   --   --    ANIONGAP 12 11 9 12   EGFRNONAA 39.5* >60.0 37.0* 29.3*

## 2017-01-25 NOTE — ASSESSMENT & PLAN NOTE
-SIRS 3/4 on admission  -afebrile throughout stay   -WBC 12.8 and has been up trending up since 1/21today 18 from 19 with turn around in previous rising trajectory   -tachycardic, tachypneic on admission now resolved   -no clear source, no pneumonia on CTA, patient received IV avelox once in ED  - no indication for infectious etiology at this time, likely from pericarditis  - BCx showed GPC on admission suspicious for contamination but started on vancomycin today over concern for immunosuppressed state, repeat cultures sent today, obtaining vanc random tomorrow and following cultures with hope to stop with concurrent KATINA and transplanted status

## 2017-01-25 NOTE — PROGRESS NOTES
Pt c/o of shortness of breath telemetry appears to be in afib, Notified Dr. Garcia. MD ordered EKG STAT, respiratory therapist notified for treatment. Will continue to monitor closely.

## 2017-01-25 NOTE — PROGRESS NOTES
Clarified with Dr. Campbell if she still wanted 24 hour urine; Per MD start collection now. Will continue to monitor.

## 2017-01-25 NOTE — PHYSICIAN QUERY
PT Name: Ibrahima Phelps  MR #: [id number]    Physician Query Form - Atrial Fibrillation and Flutter Specificity     Reviewer  Ext Willow Cote RN, CCDS  [email or web address]  This form is a permanent document in the medical record.     Query Date: January 25, 2017    Dear Provider,  By submitting this query, we are merely seeking further clarification of documentation to reflect the severity of illness of your patient. Please utilize your independent clinical judgment when addressing the question(s) below.     Indicators     Supporting Clinical Findings Location in Medical Record   X Atrial Fibrillation Documented Atrial fibrillation with rvr  Pt c/o of shortness of breath telemetry appears to be in afib, Notified Dr 1/23 h/p  1/25 nurse pn    Atrial Flutter Documented      EKG results     X Medication/Treatment Afib: supratherapeutic INR on coumadin, continue BB 1/24 pn    Other       Please clarify the type of atrial fibrillation:    [  ] Chronic  [  ] Paroxysmal  [  ] Permanent  [  ] Persistent  [  ] Other (please specify type) ____________________________  [x  ] Unspecified    Please clarify the type of atrial flutter:    [  ] Atypical  [  ] Type I  [  ] Type II  [  ] Typical  [  ] Other (please specify type) ____________________________  [x  ] Unspecified

## 2017-01-25 NOTE — ASSESSMENT & PLAN NOTE
-troponins (mildly elevated) and EKG with new T-wave inversions for possible acute coronary syndrome, re-assessed and was determined to have pericarditis by cardiology who deferred C. EKG with no signs of pericarditis. Echo and CT scan with no significant pericardial fluid.   - pain much improved after receiving colchicine. Continue daily per renal dosing.  - troponemia likely 2/2 from pericarditis with myocardial inflammation that has down trended.  -CTA reveals no PE, pleural effusions, pericardial effusion, or pneumonia.  Shows bibasilar atelectasis.  -BNP unremarkable, no signs of acute HF  -elevated ,

## 2017-01-25 NOTE — PLAN OF CARE
Problem: Patient Care Overview  Goal: Plan of Care Review  Outcome: Ongoing (interventions implemented as appropriate)  Pt free of falls/traumas/injuries. Skin remains clean, dry, and intact.  Pt started on IV Vanc, pt given 500 ml bolus. Pt re-educated on importance of measuring accurate intake and out put; pt verbalized and demonstrates understanding. Reviewed plan of care with pt; and pt verbalized understanding.  Pt VSS in no distress will continue to monitor.

## 2017-01-25 NOTE — PROGRESS NOTES
Name: Ibrahima Phelps  Medical Record Number: [id number]  Date of Service: 01/25/2017  Note By: Brooks Chiu MD    RENAL TRANSPLANT PROGRESS NOTE      Reason for Follow-up: Reassessment of kidney transplant recipient and management of immunosuppression.    Summary: 61 y.o. male with history of ESRD requiring HD for 6 months prior to 1st kidney transplant (1992) which failed ~1996. He was back on RRT until undergoing second transplant / DDRT on 4/12/2005. He is on chronic IS with TAC, MMF, and prednisone. His baseline SCr is ~ 1.5 - 1.8 mg/dL. He also has a history of HTN, CAD, Afib on anticoagulation with warfarin, HLD, and prior pericarditis (1st 1995, required window; 2nd 2010 did not require intervention). He is an active smoker as well. He came to the ED with chief complaints of substernal chest pain/discomfort with radiation to the back, worsened with inspiration and movements since last Saturday. Underwent CTA due to concerns of aortic dissection 1/23/17 (75 cc of Omnipaque 350 IV) but no evidence of the latter; seen by cardiology, ACS less likely (pain unresolved with SLNG, pain present for 3 days) and recommended therapy for pericarditis. Started on steroid and colchicine; ASA x 1 given.     Interval History: No acute events overnight. Pain much better / almost resolved today. UOP at least 1.1L/24h. Received NSS bolus today. Follow up BMP from this afternoon with SCr apparently reaching plateau.     PMHx:  Past Medical History   Diagnosis Date    Atrial fibrillation     CAD (coronary artery disease)     Diverticulosis     Hyperlipidemia     Hypertension     Immunodeficiency due to treatment with immunosuppressive medication     Metabolic bone disease     Pericarditis     S/P kidney transplant     Thrombocytopenia     Thyroid disease     Unspecified disorder of kidney and ureter      Stage IIICKD       Medications:   Scheduled Meds:   atorvastatin  40 mg Oral Daily    colchicine  0.6 mg Oral BID     doxazosin  6 mg Oral Daily    metoprolol tartrate  50 mg Oral BID    nicotine  1 patch Transdermal Daily    nifedipine  60 mg Oral Daily    pantoprazole  40 mg Oral Daily    predniSONE  50 mg Oral Daily    sodium chloride 0.9%  500 mL Intravenous Once    tacrolimus  3 mg Oral Daily    tacrolimus  4 mg Oral Daily       Continuous Infusions:     PRN Meds:.albuterol-ipratropium 2.5mg-0.5mg/3mL, dextrose 50%, dextrose 50%, glucagon (human recombinant), glucose, glucose, HYDROmorphone, flu vac [id number] 36mos up(PF), ondansetron, ondansetron, ramelteon    Review of Systems:   10 point review of systems was conducted and was negative except as mentioned in the HPI.    Physical Exam:  Vitals:  Vitals:    01/25/17 1200   BP: 131/77   Pulse: 89   Resp: 20   Temp: 98 °F (36.7 °C)       Temp:  [97.8 °F (36.6 °C)-98.4 °F (36.9 °C)] 98 °F (36.7 °C)  Pulse:  [67-96] 89  Resp:  [17-28] 20  SpO2:  [88 %-96 %] 95 %  BP: (127-155)/(57-86) 131/77        Exam  General: No acute distress. Speaking in full sentences.   HEENT: Normocephalic, atraumatic, EOM's intact bilaterally, moist mucous membranes, no oral ulcerations/lesions  Neck: Supple, symmetrical, trachea midline, no thyromegaly, no JVD  Respiratory: Clear to auscultation bilaterally, respirations unlabored, no rales/rhonchi/wheezing  Cardiovacular: Regular rate and rhythm, S1, S2 normal, KAILASH heard best over LSB  Gastrointestinal: Soft, non-tender, bowel sounds normal, no hepatosplenomegaly  Renal allograft exam: No tenderness, no bruits, normal exam  Musculoskeletal: No knee or ankle joint tenderness or swelling.   Extremities: No clubbing or cyanosis of bilateral upper extremities; no lower extremity edema bilaterally, radial pulses 2+ bilaterally, symmetric  Skin: warm and dry; no rash on exposed skin  Neurologic: CN grossly intact    Labs:  Lab Results   Component Value Date    WBC 18.80 (H) 01/25/2017    HGB 10.7 (L) 01/25/2017    HCT 32.8 (L) 01/25/2017    NA  135 (L) 01/25/2017    K 4.1 01/25/2017     01/25/2017    CO2 20 (L) 01/25/2017    BUN 28 (H) 01/25/2017    CREATININE 2.3 (H) 01/25/2017    EGFRNONAA 29.3 (A) 01/25/2017    GLUCOSE 121 (H) 04/12/2005    CALCIUM 7.1 (L) 01/25/2017    PHOS 4.5 01/24/2017    MG 1.8 05/06/2016    ALBUMIN 2.7 (L) 01/24/2017    AST 18 01/23/2017    ALT 16 01/23/2017       Assessment/Plan:  61 y.o. male with:    # History of ESRD secondary to HTN s/p #2 DDRT on 4/12/2005:   - Baseline SCr ~1.5 - 1.8 mg/dL, currently above baseline. Increase most likely from renal vasoconstriction (ASA, iodinated contrast, etc). Non oliguric with SCr apparently reaching plateau.   - Strict I/O's, daily weights.       # Immunosuppression Management:  - induction with THYMO and solumderol  - continue Prograf 4 mg am, 3 mg pm. Today's through level adequate. Will need to monitor for toxicities; goal [id number] trough level is 4 - 6.   - MMF on hold given concerns of infection. Was on 500 mg bid as outpatient. Will plan on resuming by tomorrow.   - On high dose prednisone for pericarditis treatment; once finished, continue prednisone 5 mg daily.      # Anemia, acute on chronic: Hb currently in the 9 range; previously ~12g/dL.   - Consider iron studies.   - continue to monitor     # HTN: BPs stable  - continue to monitor     # Lytes:  - continue to monitor calcium, potassium, phos, and magnesium     Brooks Chiu M.D.   Transplant Nephrology Fellow   [phone]

## 2017-01-25 NOTE — ASSESSMENT & PLAN NOTE
-continue metoprolol for rate control   -1/23 INR 4.5, supratheraputic, continue holding warfarin for now   -continue to monitor INR daily

## 2017-01-26 LAB
ANION GAP SERPL CALC-SCNC: 12 MMOL/L
BASOPHILS # BLD AUTO: 0 K/UL
BASOPHILS NFR BLD: 0 %
BUN SERPL-MCNC: 36 MG/DL
CA-I BLDV-SCNC: 0.8 MMOL/L
CALCIUM SERPL-MCNC: 6.9 MG/DL
CHLORIDE SERPL-SCNC: 108 MMOL/L
CO2 SERPL-SCNC: 19 MMOL/L
CREAT SERPL-MCNC: 2 MG/DL
CYTOMEGALOVIRUS DNA: NOT DETECTED
CYTOMEGALOVIRUS LOG (IU/ML): <2.4 LOG (10) COPIES/ML
CYTOMEGALOVIRUS PCR, QUANT: <250 COPIES/ML
CYTOMEGALOVIRUS SOURCE: NORMAL
DIFFERENTIAL METHOD: ABNORMAL
EOSINOPHIL # BLD AUTO: 0 K/UL
EOSINOPHIL NFR BLD: 0 %
ERYTHROCYTE [DISTWIDTH] IN BLOOD BY AUTOMATED COUNT: 13.9 %
EST. GFR  (AFRICAN AMERICAN): 40.2 ML/MIN/1.73 M^2
EST. GFR  (NON AFRICAN AMERICAN): 34.7 ML/MIN/1.73 M^2
GLUCOSE SERPL-MCNC: 117 MG/DL
HCT VFR BLD AUTO: 32.1 %
HGB BLD-MCNC: 10.8 G/DL
INR PPP: 2.2
KAPPA LC SER QL IA: 5.67 MG/DL
KAPPA LC/LAMBDA SER IA: 1.76
LAMBDA LC SER QL IA: 3.22 MG/DL
LYMPHOCYTES # BLD AUTO: 1.3 K/UL
LYMPHOCYTES NFR BLD: 7.9 %
MAGNESIUM SERPL-MCNC: 1.9 MG/DL
MCH RBC QN AUTO: 25.4 PG
MCHC RBC AUTO-ENTMCNC: 33.6 %
MCV RBC AUTO: 76 FL
MONOCYTES # BLD AUTO: 0.4 K/UL
MONOCYTES NFR BLD: 2.4 %
NEUTROPHILS # BLD AUTO: 14.8 K/UL
NEUTROPHILS NFR BLD: 89.2 %
PLATELET # BLD AUTO: 206 K/UL
PMV BLD AUTO: 12.2 FL
POCT GLUCOSE: 148 MG/DL (ref 70–110)
POTASSIUM SERPL-SCNC: 4 MMOL/L
PROTHROMBIN TIME: 22.1 SEC
RBC # BLD AUTO: 4.25 M/UL
SODIUM SERPL-SCNC: 139 MMOL/L
TACROLIMUS BLD-MCNC: 5 NG/ML
VANCOMYCIN SERPL-MCNC: 7.5 UG/ML
WBC # BLD AUTO: 16.53 K/UL

## 2017-01-26 PROCEDURE — 63600175 PHARM REV CODE 636 W HCPCS: Performed by: STUDENT IN AN ORGANIZED HEALTH CARE EDUCATION/TRAINING PROGRAM

## 2017-01-26 PROCEDURE — 36415 COLL VENOUS BLD VENIPUNCTURE: CPT

## 2017-01-26 PROCEDURE — 80197 ASSAY OF TACROLIMUS: CPT

## 2017-01-26 PROCEDURE — 63600175 PHARM REV CODE 636 W HCPCS: Performed by: TRANSPLANT SURGERY

## 2017-01-26 PROCEDURE — 99232 SBSQ HOSP IP/OBS MODERATE 35: CPT | Mod: ,,, | Performed by: INTERNAL MEDICINE

## 2017-01-26 PROCEDURE — 63600175 PHARM REV CODE 636 W HCPCS: Performed by: INTERNAL MEDICINE

## 2017-01-26 PROCEDURE — 99233 SBSQ HOSP IP/OBS HIGH 50: CPT | Mod: ,,, | Performed by: INTERNAL MEDICINE

## 2017-01-26 PROCEDURE — 25000003 PHARM REV CODE 250: Performed by: INTERNAL MEDICINE

## 2017-01-26 PROCEDURE — 20600001 HC STEP DOWN PRIVATE ROOM

## 2017-01-26 PROCEDURE — 80202 ASSAY OF VANCOMYCIN: CPT

## 2017-01-26 PROCEDURE — 82330 ASSAY OF CALCIUM: CPT

## 2017-01-26 PROCEDURE — 25000003 PHARM REV CODE 250

## 2017-01-26 PROCEDURE — 25000003 PHARM REV CODE 250: Performed by: STUDENT IN AN ORGANIZED HEALTH CARE EDUCATION/TRAINING PROGRAM

## 2017-01-26 PROCEDURE — 85025 COMPLETE CBC W/AUTO DIFF WBC: CPT

## 2017-01-26 PROCEDURE — 83735 ASSAY OF MAGNESIUM: CPT

## 2017-01-26 PROCEDURE — 85610 PROTHROMBIN TIME: CPT

## 2017-01-26 PROCEDURE — 25000003 PHARM REV CODE 250: Performed by: EMERGENCY MEDICINE

## 2017-01-26 PROCEDURE — 80048 BASIC METABOLIC PNL TOTAL CA: CPT

## 2017-01-26 RX ORDER — MYCOPHENOLATE MOFETIL 250 MG/1
500 CAPSULE ORAL 2 TIMES DAILY
Status: DISCONTINUED | OUTPATIENT
Start: 2017-01-26 | End: 2017-01-27 | Stop reason: HOSPADM

## 2017-01-26 RX ORDER — WARFARIN SODIUM 5 MG/1
5 TABLET ORAL DAILY
Status: DISCONTINUED | OUTPATIENT
Start: 2017-01-26 | End: 2017-01-27 | Stop reason: HOSPADM

## 2017-01-26 RX ADMIN — ATORVASTATIN CALCIUM 40 MG: 20 TABLET, FILM COATED ORAL at 08:01

## 2017-01-26 RX ADMIN — NICOTINE 1 PATCH: 14 PATCH, EXTENDED RELEASE TRANSDERMAL at 08:01

## 2017-01-26 RX ADMIN — MYCOPHENOLATE MOFETIL 500 MG: 250 CAPSULE ORAL at 09:01

## 2017-01-26 RX ADMIN — METOPROLOL TARTRATE 50 MG: 50 TABLET, FILM COATED ORAL at 09:01

## 2017-01-26 RX ADMIN — TACROLIMUS 4 MG: 1 CAPSULE, GELATIN COATED ORAL at 08:01

## 2017-01-26 RX ADMIN — COLCHICINE 0.6 MG: 0.6 TABLET, FILM COATED ORAL at 08:01

## 2017-01-26 RX ADMIN — PREDNISONE 50 MG: 20 TABLET ORAL at 08:01

## 2017-01-26 RX ADMIN — METOPROLOL TARTRATE 50 MG: 50 TABLET, FILM COATED ORAL at 08:01

## 2017-01-26 RX ADMIN — HYDROMORPHONE HYDROCHLORIDE 1 MG: 1 INJECTION, SOLUTION INTRAMUSCULAR; INTRAVENOUS; SUBCUTANEOUS at 09:01

## 2017-01-26 RX ADMIN — NIFEDIPINE 60 MG: 30 TABLET, FILM COATED, EXTENDED RELEASE ORAL at 08:01

## 2017-01-26 RX ADMIN — PANTOPRAZOLE SODIUM 40 MG: 40 TABLET, DELAYED RELEASE ORAL at 08:01

## 2017-01-26 RX ADMIN — DOXAZOSIN 6 MG: 4 TABLET ORAL at 08:01

## 2017-01-26 RX ADMIN — TACROLIMUS 3 MG: 1 CAPSULE, GELATIN COATED ORAL at 05:01

## 2017-01-26 RX ADMIN — WARFARIN SODIUM 5 MG: 5 TABLET ORAL at 05:01

## 2017-01-26 NOTE — PLAN OF CARE
Problem: Patient Care Overview  Goal: Plan of Care Review  Outcome: Ongoing (interventions implemented as appropriate)  Pt free of falls/traumas/injuries. Skin remains clean, dry, and intact.  Pt had a small run of NSVT asymptomatic. 24 hour urine due end 2300. Pt re-educated on importance of measuring accurate intake and out put; pt verbalized and demonstrates understanding. Reviewed plan of care with pt; and pt verbalized understanding.  Pt VSS in no distress will continue to monitor.

## 2017-01-26 NOTE — PROGRESS NOTES
Name: Ibrahima Phelps  Medical Record Number: [id number]  Date of Service: 01/26/2017  Note By: Brooks Chiu MD    RENAL TRANSPLANT PROGRESS NOTE      Reason for Follow-up: Reassessment of kidney transplant recipient and management of immunosuppression.    Summary: 61 y.o. male with history of ESRD requiring HD for 6 months prior to 1st kidney transplant (1992) which failed ~1996. He was back on RRT until undergoing second transplant / DDRT on 4/12/2005. He is on chronic IS with TAC, MMF, and prednisone. His baseline SCr is ~ 1.5 - 1.8 mg/dL. He also has a history of HTN, CAD, Afib on anticoagulation with warfarin, HLD, and prior pericarditis (1st 1995, required window; 2nd 2010 did not require intervention). He is an active smoker as well. He came to the ED with chief complaints of substernal chest pain/discomfort with radiation to the back, worsened with inspiration and movements since last Saturday. Underwent CTA due to concerns of aortic dissection 1/23/17 (75 cc of Omnipaque 350 IV) but no evidence of the latter; seen by cardiology, ACS less likely (pain unresolved with SLNG, pain present for 3 days) and recommended therapy for pericarditis. Started on steroid and colchicine; ASA x 1 given.     Interval History: No acute events overnight. Pain resolved. No SOB. SCr on decreasing trend. UOP adequate.     PMHx:  Past Medical History   Diagnosis Date    Atrial fibrillation     CAD (coronary artery disease)     Diverticulosis     Hyperlipidemia     Hypertension     Immunodeficiency due to treatment with immunosuppressive medication     Metabolic bone disease     Pericarditis     S/P kidney transplant     Thrombocytopenia     Thyroid disease     Unspecified disorder of kidney and ureter      Stage IIICKD       Medications:   Scheduled Meds:   atorvastatin  40 mg Oral Daily    colchicine  0.6 mg Oral Daily    doxazosin  6 mg Oral Daily    metoprolol tartrate  50 mg Oral BID    nicotine  1 patch  Transdermal Daily    nifedipine  60 mg Oral Daily    pantoprazole  40 mg Oral Daily    predniSONE  50 mg Oral Daily    tacrolimus  3 mg Oral Daily    tacrolimus  4 mg Oral Daily       Continuous Infusions:     PRN Meds:.albuterol-ipratropium 2.5mg-0.5mg/3mL, dextrose 50%, dextrose 50%, glucagon (human recombinant), glucose, glucose, HYDROmorphone, flu vac [id number] 36mos up(PF), ondansetron, ondansetron, ramelteon    Review of Systems:   10 point review of systems was conducted and was negative except as mentioned in the HPI.    Physical Exam:  Vitals:  Vitals:    01/26/17 0740   BP: (!) 155/78   Pulse: 84   Resp: 18   Temp: 97.9 °F (36.6 °C)       Temp:  [97.9 °F (36.6 °C)-98.2 °F (36.8 °C)] 97.9 °F (36.6 °C)  Pulse:  [] 84  Resp:  [18-22] 18  SpO2:  [92 %-95 %] 95 %  BP: (128-155)/(61-86) 155/78        Exam  General: No acute distress. Speaking in full sentences.   HEENT: Normocephalic, atraumatic, EOM's intact bilaterally, moist mucous membranes, no oral ulcerations/lesions  Neck: Supple, symmetrical, trachea midline, no thyromegaly, no JVD  Respiratory: Clear to auscultation bilaterally, respirations unlabored, no rales/rhonchi/wheezing  Cardiovacular: Regular rate and rhythm, S1, S2 normal, KAILASH heard best over LSB  Gastrointestinal: Soft, non-tender, bowel sounds normal, no hepatosplenomegaly  Renal allograft exam: No tenderness, no bruits, normal exam  Musculoskeletal: No knee or ankle joint tenderness or swelling.   Extremities: No clubbing or cyanosis of bilateral upper extremities; no lower extremity edema bilaterally, radial pulses 2+ bilaterally, symmetric  Skin: warm and dry; no rash on exposed skin  Neurologic: CN grossly intact    Labs:  Lab Results   Component Value Date    WBC 16.53 (H) 01/26/2017    HGB 10.8 (L) 01/26/2017    HCT 32.1 (L) 01/26/2017     01/26/2017    K 4.0 01/26/2017     01/26/2017    CO2 19 (L) 01/26/2017    BUN 36 (H) 01/26/2017    CREATININE 2.0 (H)  01/26/2017    EGFRNONAA 34.7 (A) 01/26/2017    GLUCOSE 121 (H) 04/12/2005    CALCIUM 6.9 (LL) 01/26/2017    PHOS 4.5 01/24/2017    MG 1.8 05/06/2016    ALBUMIN 2.7 (L) 01/24/2017    AST 18 01/23/2017    ALT 16 01/23/2017       Assessment/Plan:  61 y.o. male with:    # History of ESRD secondary to HTN s/p #2 DDRT on 4/12/2005:   - Baseline SCr ~1.5 - 1.8 mg/dL, currently above baseline. Increase most likely from renal vasoconstriction (ASA, iodinated contrast, etc). Non oliguric with SCr apparently reaching plateau.   - Strict I/O's, daily weights.       # Immunosuppression Management:  - induction with THYMO and solumderol  - continue Prograf 4 mg am, 3 mg pm. Today's through level adequate. Will need to monitor for toxicities; goal [id number] trough level is 4 - 6.   - MMF resumed today, 500 mg bid.   - On high dose prednisone for pericarditis treatment; once finished, continue prednisone 5 mg daily.      # Anemia, acute on chronic: Hb currently in the 9 range; previously ~12g/dL.   - Consider iron studies.   - continue to monitor     # HTN: BPs stable  - continue to monitor     # Lytes:  - For hypocalcemia, consider requesting ionized calcium and if still low replace accordingly.   - continue to monitor calcium, potassium, phos, and magnesium     Brooks Chiu M.D.   Transplant Nephrology Fellow   [phone]

## 2017-01-26 NOTE — PLAN OF CARE
Problem: Patient Care Overview  Goal: Plan of Care Review  Outcome: Ongoing (interventions implemented as appropriate)  24 hour urine collection started overnight. CBG monitored overnight and no PRN insulin indicated. Free of falls/trauma/injury. VSS. Denies any CP, SOB, palpitations, dizziness, pain and discomfort. Turning/repositioning independently in bed. Plan of care reviewed with patient and all questions answered, verbalizes understanding. No acute distress noted. Will continue to monitor.

## 2017-01-27 ENCOUNTER — TELEPHONE (OUTPATIENT)
Dept: NEPHROLOGY | Facility: CLINIC | Age: 62
End: 2017-01-27

## 2017-01-27 VITALS
WEIGHT: 259.25 LBS | BODY MASS INDEX: 34.36 KG/M2 | OXYGEN SATURATION: 100 % | RESPIRATION RATE: 18 BRPM | TEMPERATURE: 98 F | HEART RATE: 65 BPM | HEIGHT: 73 IN | DIASTOLIC BLOOD PRESSURE: 80 MMHG | SYSTOLIC BLOOD PRESSURE: 148 MMHG

## 2017-01-27 PROBLEM — N17.9 AKI (ACUTE KIDNEY INJURY): Status: RESOLVED | Noted: 2017-01-25 | Resolved: 2017-01-27

## 2017-01-27 LAB
ANION GAP SERPL CALC-SCNC: 8 MMOL/L
ANION GAP SERPL CALC-SCNC: 9 MMOL/L
BASOPHILS # BLD AUTO: 0.01 K/UL
BASOPHILS NFR BLD: 0.1 %
BUN SERPL-MCNC: 28 MG/DL
BUN SERPL-MCNC: 29 MG/DL
CALCIUM SERPL-MCNC: 6.5 MG/DL
CALCIUM SERPL-MCNC: 6.9 MG/DL
CHLORIDE SERPL-SCNC: 110 MMOL/L
CHLORIDE SERPL-SCNC: 111 MMOL/L
CO2 SERPL-SCNC: 22 MMOL/L
CO2 SERPL-SCNC: 23 MMOL/L
CREAT SERPL-MCNC: 1.6 MG/DL
CREAT SERPL-MCNC: 1.7 MG/DL
DIFFERENTIAL METHOD: ABNORMAL
EOSINOPHIL # BLD AUTO: 0 K/UL
EOSINOPHIL NFR BLD: 0.3 %
ERYTHROCYTE [DISTWIDTH] IN BLOOD BY AUTOMATED COUNT: 14 %
EST. GFR  (AFRICAN AMERICAN): 48.9 ML/MIN/1.73 M^2
EST. GFR  (AFRICAN AMERICAN): 52.6 ML/MIN/1.73 M^2
EST. GFR  (NON AFRICAN AMERICAN): 42.3 ML/MIN/1.73 M^2
EST. GFR  (NON AFRICAN AMERICAN): 45.5 ML/MIN/1.73 M^2
GLUCOSE SERPL-MCNC: 117 MG/DL
GLUCOSE SERPL-MCNC: 127 MG/DL
HCT VFR BLD AUTO: 31 %
HGB BLD-MCNC: 10.5 G/DL
INR PPP: 1.8
LYMPHOCYTES # BLD AUTO: 1.6 K/UL
LYMPHOCYTES NFR BLD: 13.7 %
MAGNESIUM SERPL-MCNC: 1.9 MG/DL
MCH RBC QN AUTO: 26 PG
MCHC RBC AUTO-ENTMCNC: 33.9 %
MCV RBC AUTO: 77 FL
MONOCYTES # BLD AUTO: 0.6 K/UL
MONOCYTES NFR BLD: 5.1 %
NEUTROPHILS # BLD AUTO: 9.3 K/UL
NEUTROPHILS NFR BLD: 80 %
PLATELET # BLD AUTO: 221 K/UL
PMV BLD AUTO: 12.3 FL
POCT GLUCOSE: 168 MG/DL (ref 70–110)
POTASSIUM SERPL-SCNC: 3.5 MMOL/L
POTASSIUM SERPL-SCNC: 4 MMOL/L
PROT 24H UR-MRATE: 660 MG/SPEC
PROT UR-MCNC: 33 MG/DL
PROTHROMBIN TIME: 17.7 SEC
RBC # BLD AUTO: 4.04 M/UL
SODIUM SERPL-SCNC: 141 MMOL/L
SODIUM SERPL-SCNC: 142 MMOL/L
TACROLIMUS BLD-MCNC: 3.6 NG/ML
URINE COLLECTION DURATION: 24 HR
URINE VOLUME: 2000 ML
WBC # BLD AUTO: 11.67 K/UL

## 2017-01-27 PROCEDURE — 63600175 PHARM REV CODE 636 W HCPCS: Performed by: TRANSPLANT SURGERY

## 2017-01-27 PROCEDURE — 25000003 PHARM REV CODE 250: Performed by: TRANSPLANT SURGERY

## 2017-01-27 PROCEDURE — 80048 BASIC METABOLIC PNL TOTAL CA: CPT

## 2017-01-27 PROCEDURE — 25000003 PHARM REV CODE 250

## 2017-01-27 PROCEDURE — 36415 COLL VENOUS BLD VENIPUNCTURE: CPT

## 2017-01-27 PROCEDURE — 25000003 PHARM REV CODE 250: Performed by: EMERGENCY MEDICINE

## 2017-01-27 PROCEDURE — 99232 SBSQ HOSP IP/OBS MODERATE 35: CPT | Mod: ,,, | Performed by: INTERNAL MEDICINE

## 2017-01-27 PROCEDURE — 63600175 PHARM REV CODE 636 W HCPCS: Performed by: INTERNAL MEDICINE

## 2017-01-27 PROCEDURE — 84156 ASSAY OF PROTEIN URINE: CPT

## 2017-01-27 PROCEDURE — 83735 ASSAY OF MAGNESIUM: CPT

## 2017-01-27 PROCEDURE — 84166 PROTEIN E-PHORESIS/URINE/CSF: CPT

## 2017-01-27 PROCEDURE — 63600175 PHARM REV CODE 636 W HCPCS: Performed by: STUDENT IN AN ORGANIZED HEALTH CARE EDUCATION/TRAINING PROGRAM

## 2017-01-27 PROCEDURE — 99238 HOSP IP/OBS DSCHRG MGMT 30/<: CPT | Mod: ,,, | Performed by: INTERNAL MEDICINE

## 2017-01-27 PROCEDURE — 85025 COMPLETE CBC W/AUTO DIFF WBC: CPT

## 2017-01-27 PROCEDURE — 85610 PROTHROMBIN TIME: CPT

## 2017-01-27 PROCEDURE — 25000003 PHARM REV CODE 250: Performed by: STUDENT IN AN ORGANIZED HEALTH CARE EDUCATION/TRAINING PROGRAM

## 2017-01-27 PROCEDURE — 80048 BASIC METABOLIC PNL TOTAL CA: CPT | Mod: 91

## 2017-01-27 PROCEDURE — 80197 ASSAY OF TACROLIMUS: CPT

## 2017-01-27 PROCEDURE — 84166 PROTEIN E-PHORESIS/URINE/CSF: CPT | Mod: 26,,, | Performed by: PATHOLOGY

## 2017-01-27 RX ORDER — PREDNISONE 10 MG/1
10 TABLET ORAL DAILY
Qty: 40 TABLET | Refills: 0 | Status: SHIPPED | OUTPATIENT
Start: 2017-01-27 | End: 2017-02-06

## 2017-01-27 RX ORDER — CALCIUM CARBONATE 1250 MG/5ML
1000 SUSPENSION ORAL 2 TIMES DAILY WITH MEALS
Status: DISCONTINUED | OUTPATIENT
Start: 2017-01-27 | End: 2017-01-27 | Stop reason: HOSPADM

## 2017-01-27 RX ORDER — CALCIUM CARBONATE 500(1250)
500 TABLET ORAL
Status: DISCONTINUED | OUTPATIENT
Start: 2017-01-27 | End: 2017-01-27

## 2017-01-27 RX ORDER — POTASSIUM CHLORIDE 750 MG/1
30 CAPSULE, EXTENDED RELEASE ORAL
Status: DISCONTINUED | OUTPATIENT
Start: 2017-01-27 | End: 2017-01-27 | Stop reason: SDUPTHER

## 2017-01-27 RX ORDER — CALCIUM CHLORIDE INJECTION 100 MG/ML
1 INJECTION, SOLUTION INTRAVENOUS ONCE
Status: DISCONTINUED | OUTPATIENT
Start: 2017-01-27 | End: 2017-01-27

## 2017-01-27 RX ORDER — PARICALCITOL 1 UG/1
1 CAPSULE, LIQUID FILLED ORAL DAILY
Status: DISCONTINUED | OUTPATIENT
Start: 2017-01-27 | End: 2017-01-27

## 2017-01-27 RX ORDER — ACETAMINOPHEN 325 MG/1
650 TABLET ORAL EVERY 6 HOURS PRN
Status: DISCONTINUED | OUTPATIENT
Start: 2017-01-27 | End: 2017-01-27 | Stop reason: HOSPADM

## 2017-01-27 RX ORDER — PARICALCITOL 1 UG/1
1 CAPSULE, LIQUID FILLED ORAL
Status: DISCONTINUED | OUTPATIENT
Start: 2017-01-27 | End: 2017-01-27 | Stop reason: HOSPADM

## 2017-01-27 RX ORDER — COLCHICINE 0.6 MG/1
0.6 TABLET ORAL 2 TIMES DAILY
Qty: 60 TABLET | Refills: 2 | Status: SHIPPED | OUTPATIENT
Start: 2017-01-27 | End: 2017-02-03 | Stop reason: SINTOL

## 2017-01-27 RX ORDER — ATORVASTATIN CALCIUM 40 MG/1
40 TABLET, FILM COATED ORAL DAILY
Qty: 90 TABLET | Refills: 3 | Status: SHIPPED | OUTPATIENT
Start: 2017-01-27 | End: 2017-02-03 | Stop reason: ALTCHOICE

## 2017-01-27 RX ORDER — TACROLIMUS 1 MG/1
4 CAPSULE ORAL 2 TIMES DAILY
Status: DISCONTINUED | OUTPATIENT
Start: 2017-01-27 | End: 2017-01-27 | Stop reason: HOSPADM

## 2017-01-27 RX ORDER — POTASSIUM CHLORIDE 750 MG/1
30 CAPSULE, EXTENDED RELEASE ORAL
Status: COMPLETED | OUTPATIENT
Start: 2017-01-27 | End: 2017-01-27

## 2017-01-27 RX ORDER — CALCIUM CARBONATE 1250 MG/5ML
1000 SUSPENSION ORAL 2 TIMES DAILY
COMMUNITY
Start: 2017-01-27 | End: 2017-02-03 | Stop reason: SDUPTHER

## 2017-01-27 RX ORDER — TACROLIMUS 1 MG/1
4 CAPSULE ORAL EVERY 12 HOURS
Qty: 240 CAPSULE | Refills: 2 | Status: SHIPPED | OUTPATIENT
Start: 2017-01-27 | End: 2017-03-20 | Stop reason: DRUGHIGH

## 2017-01-27 RX ADMIN — CALCIUM GLUCONATE 1000 MG: 94 INJECTION, SOLUTION INTRAVENOUS at 10:01

## 2017-01-27 RX ADMIN — TACROLIMUS 4 MG: 1 CAPSULE, GELATIN COATED ORAL at 09:01

## 2017-01-27 RX ADMIN — POTASSIUM CHLORIDE 30 MEQ: 750 CAPSULE, EXTENDED RELEASE ORAL at 11:01

## 2017-01-27 RX ADMIN — PANTOPRAZOLE SODIUM 40 MG: 40 TABLET, DELAYED RELEASE ORAL at 09:01

## 2017-01-27 RX ADMIN — DOXAZOSIN 6 MG: 4 TABLET ORAL at 09:01

## 2017-01-27 RX ADMIN — PARICALCITOL 1 MCG: 1 CAPSULE, LIQUID FILLED ORAL at 03:01

## 2017-01-27 RX ADMIN — MYCOPHENOLATE MOFETIL 500 MG: 250 CAPSULE ORAL at 09:01

## 2017-01-27 RX ADMIN — ACETAMINOPHEN 650 MG: 325 TABLET ORAL at 12:01

## 2017-01-27 RX ADMIN — NIFEDIPINE 60 MG: 30 TABLET, FILM COATED, EXTENDED RELEASE ORAL at 09:01

## 2017-01-27 RX ADMIN — Medication 1000 MG: at 03:01

## 2017-01-27 RX ADMIN — PREDNISONE 50 MG: 20 TABLET ORAL at 09:01

## 2017-01-27 RX ADMIN — POTASSIUM CHLORIDE 30 MEQ: 750 CAPSULE, EXTENDED RELEASE ORAL at 09:01

## 2017-01-27 RX ADMIN — ATORVASTATIN CALCIUM 40 MG: 20 TABLET, FILM COATED ORAL at 09:01

## 2017-01-27 RX ADMIN — NICOTINE 1 PATCH: 14 PATCH, EXTENDED RELEASE TRANSDERMAL at 09:01

## 2017-01-27 RX ADMIN — METOPROLOL TARTRATE 50 MG: 50 TABLET, FILM COATED ORAL at 09:01

## 2017-01-27 NOTE — PLAN OF CARE
Problem: Patient Care Overview  Goal: Plan of Care Review  Outcome: Revised  Plan of care discussed with patient. Patient is free of fall/trauma/injury. Denies CP, SOB, or pain/discomfort. All questions addressed. Will continue to monitor

## 2017-01-27 NOTE — PLAN OF CARE
Problem: Patient Care Overview  Goal: Plan of Care Review  Outcome: Ongoing (interventions implemented as appropriate)  24 hour urine collection completed overnight. CBG monitored overnight and no PRN insulin indicated. Free of falls/trauma/injury. VSS. Denies any CP, SOB, palpitations, and dizziness. Pt c/o pain that is relieved by PRN dilaudid. Turning/repositioning independently in bed. Plan of care reviewed with patient and all questions answered, verbalizes understanding. No acute distress noted. Will continue to monitor.

## 2017-01-27 NOTE — ASSESSMENT & PLAN NOTE
- CKD III on transplanted status and now KATINA on top, improving   - nephro and cardiac diet  - Hold ASA

## 2017-01-27 NOTE — PROGRESS NOTES
Patient is ready for discharge. Patient stable alert and oriented. IVs removed. No complaints of pain. Discussed discharge plan. Reviewed medications and side effects, appointments, and answered questions with patient and family. Prednisone RX given to patient.

## 2017-01-27 NOTE — DISCHARGE INSTRUCTIONS
You were admitted to [facility] for Pericarditis.  From the Cardiology team, you are instructed to take the Colchicine 0.6 mg orally twice a day for up to 3 months.  In addition, we have a steroid taper, where your dose of prednisone will decrease to 40 for 4 days, then 30 for 4 days, then 20 for 4 days, then 10 for 4 days, then back to your original 5.  We will prescribe enough Prednisone for the taper with instructions on the bottle.  After you finish the medicines, you can continue to your original prednisone 5 daily.  You will also follow up with Cardiology in 4 weeks time.  In addition, we have increased your tacrolimus to 4 mg twice a day, and your Cellcept will remain the same.  You will follow up with them in 1 week to assess your levels.  Finally, we would like you to follow up with your primary care provider in 2 weeks to let him know you were in the hospital.  Thank you for choosing [facility] for your health care.  If you have any questions, please do not hesitate to call the on-call number listed above.

## 2017-01-27 NOTE — ASSESSMENT & PLAN NOTE
-SIRS 3/4 on admission  -afebrile throughout stay   -WBC down to 16.5 on 1/26, likely 2/2 steroids   -tachycardic, tachypneic on admission now resolved   -no clear source, no pneumonia on CTA, patient received IV avelox once in ED  - no indication for infectious etiology at this time, likely from pericarditis  - BCx showed GPC on admission suspicious for contamination but started on vancomycin today over concern for immunosuppressed state, repeat cultures reveal NGTD, likely not infectious in etiology.  Will hold antibiotics at this time and continue monitoring

## 2017-01-27 NOTE — ASSESSMENT & PLAN NOTE
-transplant in 2005  -continue prograf at home dose, prednisone per pericarditis dosing for now at 50mg .  Per cardiology, plan to taper prednisone dose   -daily prograf levels  -hold mycophenolate while admitted and question for GPC bacteremia  -Cr elevated 2/2 either ASA or IV contrast, but decreasing, currently at 2.0.  Will continue to monitor with daily CMPs  -KMT following appreciate recommendations

## 2017-01-27 NOTE — PROGRESS NOTES
[facility] Hospital Medicine  Progress Note    Patient Name: Ibrahima Phelps  MRN: [id number]  Patient Class: IP- Inpatient   Admission Date: 1/23/2017  Length of Stay: 3 days  Attending Physician: Christina Campbell MD  Primary Care Provider: Scott Miranda Jr, MD    Hospital Medicine Team: [facility] HOSP MED 3 Jose Santacruz MD    Subjective:     Principal Problem:Acute pericarditis    HPI:  Ibrahima Phelps is a 62yo male with PMH of HTN, HLD, CAD, pericarditis, afib, thyroid disease, s/p kidney transplant 2005, coronary angioplasty with stent 2006, cardioversion, admitted via ED for complaint of chest pain and SOB.  Patient describes the chest pain as a soreness and difficulty moving his chest cavity that started 2 days ago on the R lower parasternal region that spread to the L lower parasternal region as well.  He also states that the pain starts in his back between his scapula and radiates forward.  He denies recent heavy exercise, no trauma.  He reports that the pain is constantly present but waxes and wanes in intensity.  His pain worsens with movement, taking deep breaths, and coughing.  He has breathing treatments at home that has not relieved his pain or his SOB.  He has a history of pericarditis but states that the pericarditis pain is not the same as his current pain.  He denies having had this pain before.  He states that he has had loss of appetite in the past 2 days and attributes it to coughing.  Patient denies fever/chills, headache, sore throat, nausea, vomiting, abd pain, dysuria, diarrhea, weakness/numbness.    Patient was at [facility] ED the past two days for the same complaint.  On the first visit, his symptoms appeared to be musculoskeletal and he was discharged with flexeril which he states helped a little.  On the second visit he had mildly elevated troponin at 0.034 and cardiology stated he was ok for discharge.  On 1/23 patient saw his PCP who sent the patient back to the  ED.      Interval History:   Patient reports improvement of chest pain from admission, but still notes pain level above his baseline.  Received dilaudid overnight for episode of pain which relieved pain.  Otherwise, denies fevers, chills, N/V, shortness of breath, or abdominal pain.      Review of Systems     Denies any fevers or chills. He denies any abdominal pain, n/v. No lower ext swelling or pain.    Objective:     Vital Signs (Most Recent):  Temp: 98.7 °F (37.1 °C) (01/26/17 1200)  Pulse: 85 (01/26/17 1700)  Resp: 20 (01/26/17 1200)  BP: 138/80 (01/26/17 1200)  SpO2: 97 % (01/26/17 1200) Vital Signs (24h Range):  Temp:  [97.9 °F (36.6 °C)-98.7 °F (37.1 °C)] 98.7 °F (37.1 °C)  Pulse:  [] 85  Resp:  [18-20] 20  SpO2:  [92 %-97 %] 97 %  BP: (128-155)/(61-80) 138/80     Weight: 117.6 kg (259 lb 4.2 oz)  Body mass index is 34.21 kg/(m^2).    Intake/Output Summary (Last 24 hours) at 01/26/17 1826  Last data filed at 01/26/17 1400   Gross per 24 hour   Intake              638 ml   Output             1400 ml   Net             -762 ml      Physical Exam    General: well developed, well nourished, NAD  Neck: supple, symmetrical, trachea midline, no JVD  Cardiovascular: Heart: regular rate and rhythm, S1, S2 normal, no murmur, click, no rub or gallop.  Lungs: clear to auscultation bilaterally and normal respiratory effort  Chest Wall: no tenderness  Abdomen/Rectal: Abdomen: Non distended. Soft and lax.  Extremities: no edema, redness or tenderness in the calves or thighs. Pulses: 2+ and symmetric  Skin: Skin color, texture, turgor normal. No rashes or lesions  Musculoskeletal: full range of motion of joints  Lymph Nodes: No cervical or supraclavicular adenopathy  Psych/Behavioral: Normal. Alert and oriented, appropriate affect.    Significant Labs:     Recent Results (from the past 24 hour(s))   POCT glucose    Collection Time: 01/25/17 10:30 PM   Result Value Ref Range    POCT Glucose 148 (H) 70 - 110 mg/dL   CBC  with Automated Differential    Collection Time: 01/26/17  4:27 AM   Result Value Ref Range    WBC 16.53 (H) 3.90 - 12.70 K/uL    RBC 4.25 (L) 4.60 - 6.20 M/uL    Hemoglobin 10.8 (L) 14.0 - 18.0 g/dL    Hematocrit 32.1 (L) 40.0 - 54.0 %    MCV 76 (L) 82 - 98 fL    MCH 25.4 (L) 27.0 - 31.0 pg    MCHC 33.6 32.0 - 36.0 %    RDW 13.9 11.5 - 14.5 %    Platelets 206 150 - 350 K/uL    MPV 12.2 9.2 - 12.9 fL    Gran # 14.8 (H) 1.8 - 7.7 K/uL    Lymph # 1.3 1.0 - 4.8 K/uL    Mono # 0.4 0.3 - 1.0 K/uL    Eos # 0.0 0.0 - 0.5 K/uL    Baso # 0.00 0.00 - 0.20 K/uL    Gran% 89.2 (H) 38.0 - 73.0 %    Lymph% 7.9 (L) 18.0 - 48.0 %    Mono% 2.4 (L) 4.0 - 15.0 %    Eosinophil% 0.0 0.0 - 8.0 %    Basophil% 0.0 0.0 - 1.9 %    Differential Method Automated    PT/INR    Collection Time: 01/26/17  4:27 AM   Result Value Ref Range    Prothrombin Time 22.1 (H) 9.0 - 12.5 sec    INR 2.2 (H) 0.8 - 1.2   Basic Metabolic Panel (BMP)    Collection Time: 01/26/17  4:27 AM   Result Value Ref Range    Sodium 139 136 - 145 mmol/L    Potassium 4.0 3.5 - 5.1 mmol/L    Chloride 108 95 - 110 mmol/L    CO2 19 (L) 23 - 29 mmol/L    Glucose 117 (H) 70 - 110 mg/dL    BUN, Bld 36 (H) 8 - 23 mg/dL    Creatinine 2.0 (H) 0.5 - 1.4 mg/dL    Calcium 6.9 (LL) 8.7 - 10.5 mg/dL    Anion Gap 12 8 - 16 mmol/L    eGFR if African American 40.2 (A) >60 mL/min/1.73 m^2    eGFR if non  34.7 (A) >60 mL/min/1.73 m^2   Tacrolimus level    Collection Time: 01/26/17  4:27 AM   Result Value Ref Range    Tacrolimus Lvl 5.0 5.0 - 15.0 ng/mL   Vancomycin, random    Collection Time: 01/26/17  4:27 AM   Result Value Ref Range    Vancomycin, Random 7.5 Not established ug/mL   Magnesium    Collection Time: 01/26/17 11:52 AM   Result Value Ref Range    Magnesium 1.9 1.6 - 2.6 mg/dL         Assessment/Plan:      * Acute pericarditis  -troponins (mildly elevated) and EKG with new T-wave inversions for possible acute coronary syndrome, re-assessed and was determined to have  pericarditis by cardiology who deferred LHC. EKG with no signs of pericarditis. Echo and CT scan with no significant pericardial fluid.   - pain much improved after receiving colchicine and steroids. Continue daily per renal dosing.  - troponemia likely 2/2 from pericarditis with myocardial inflammation that has down trended.  -CTA reveals no PE, pleural effusions, pericardial effusion, or pneumonia.  Shows bibasilar atelectasis.  -BNP unremarkable, no signs of acute HF  -elevated ,       Sepsis  -SIRS 3/4 on admission  -afebrile throughout stay   -WBC down to 16.5 on 1/26, likely 2/2 steroids   -tachycardic, tachypneic on admission now resolved   -no clear source, no pneumonia on CTA, patient received IV avelox once in ED  - no indication for infectious etiology at this time, likely from pericarditis  - BCx showed GPC on admission suspicious for contamination but started on vancomycin today over concern for immunosuppressed state, repeat cultures reveal NGTD, likely not infectious in etiology.  Will hold antibiotics at this time and continue monitoring     S/P kidney transplant  -transplant in 2005  -continue prograf at home dose, prednisone per pericarditis dosing for now at 50mg .  Per cardiology, plan to taper prednisone dose   -daily prograf levels  -hold mycophenolate while admitted and question for GPC bacteremia  -Cr elevated 2/2 either ASA or IV contrast, but decreasing, currently at 2.0.  Will continue to monitor with daily CMPs  -KMT following appreciate recommendations     Immunodeficiency due to treatment with immunosuppressive medication  Continue monitoring for infection now on increased dosing of prednisone, holding MMF       Hypertension  -continue metoprolol, nifedipine, doxazosin, hold spironolactone for concurrent KATINA  -hold lasix in setting of transplant kidney and contrast induced nephropathy     Hyperlipidemia  - Decreasing lipitor to 40mg daily      CKD (chronic kidney disease)  stage 3, GFR 30-59 ml/min  - CKD III on transplanted status and now KATINA on top, improving   - nephro and cardiac diet  - Hold ASA       SOB (shortness of breath)  -wheezing, improved with breathing treatments, continue duonebs q4H PRN  - likely 2/2 to pericarditis and pain, will continue NC oxygen and wean as tolerated        Atrial fibrillation with rapid ventricular response  - continue metoprolol for rate control   -1/26 INR 2.2, will resume warfarin 5 mg PO daily    -continue to monitor INR daily     KATINA (acute kidney injury)  - Likely secondary to high dose ASA and contras induce nephropathy from CTA done on admission to rule out aortic dissection  - resolving, continue daily CMPs      VTE Risk Mitigation         Ordered     Medium Risk of VTE  Once      01/23/17 2152     Place sequential compression device  Until discontinued      01/23/17 2152     Place RASHAD hose  Until discontinued      01/23/17 2152          Jose Santacruz MD  Department of Hospital Medicine   [facility]

## 2017-01-27 NOTE — ASSESSMENT & PLAN NOTE
- Likely secondary to high dose ASA and contras induce nephropathy from CTA done on admission to rule out aortic dissection  - resolving, continue daily CMPs

## 2017-01-27 NOTE — DISCHARGE SUMMARY
[facility] Hospital Medicine  Discharge Summary      Patient Name: Ibrahima Phelps  MRN: [id number]  Admission Date: 1/23/2017  Hospital Length of Stay: 4 days  Discharge Date and Time: 1/27/2017 4:25 PM  Attending Physician: Christina Campbell MD   Discharging Provider: Jose Santacruz MD  Primary Care Provider: Scott Mrianda Jr, MD  [facility] Medicine Team: [facility] 3 Jose Santacruz MD    HPI:   Ibrahima Phelps is a 60yo male with PMH of HTN, HLD, CAD, pericarditis, afib, thyroid disease, s/p kidney transplant 2005, coronary angioplasty with stent 2006, cardioversion, admitted via ED for complaint of chest pain and SOB.  Patient describes the chest pain as a soreness and difficulty moving his chest cavity that started 2 days ago on the R lower parasternal region that spread to the L lower parasternal region as well.  He also states that the pain starts in his back between his scapula and radiates forward.  He denies recent heavy exercise, no trauma.  He reports that the pain is constantly present but waxes and wanes in intensity.  His pain worsens with movement, taking deep breaths, and coughing.  He has breathing treatments at home that has not relieved his pain or his SOB.  He has a history of pericarditis but states that the pericarditis pain is not the same as his current pain.  He denies having had this pain before.  He states that he has had loss of appetite in the past 2 days and attributes it to coughing.  Patient denies fever/chills, headache, sore throat, nausea, vomiting, abd pain, dysuria, diarrhea, weakness/numbness.    Patient was at [facility] the past two days for the same complaint.  On the first visit, his symptoms appeared to be musculoskeletal and he was discharged with flexeril which he states helped a little.  On the second visit he had mildly elevated troponin at 0.034 and cardiology stated he was ok for discharge.  On 1/23 patient saw his PCP who sent the patient back to the  ED.      * No surgery found *      Indwelling Lines/Drains at time of discharge:   Lines/Drains/Airways          No matching active lines, drains, or airways        Hospital Course:   Patient was admitted on 1/23/2017.  Given chest pain, cardiology was consulted, where they started patient on ACS protocol.  It was eventually ruled that the patient has pericarditis, where he was then given colchicine and prednisone.  Patient with noted improvement of chest pain where the patient had resolution of said chest pain.  Per cardiology recommendations, they suggested colchicine 0.6 mg PO BID with a prednisone taper.  KTM was also consulted, where they increased the patients tacrolimus to 4 mg PO BID.  Patient was subsequently discharged on 1/27 in good condition with complete resolution of chest pain.  He will have follow up with KTM in 1 week for transplant follow up, follow up with cardiology in 1 month for pericarditis, and follow up with his PCP in 2 weeks for post hospital care stay.         Review of Systems      Denies any fevers or chills. He denies any abdominal pain, n/v. No lower ext swelling or pain.      Vitals:    01/27/17 1300 01/27/17 1515 01/27/17 1538 01/27/17 1600   BP:  (!) 148/80     BP Location:  Left arm     Patient Position:  Lying     BP Method:  Automatic     Pulse: 62 70 65 65   Resp:  18     Temp:  98.1 °F (36.7 °C)     TempSrc:  Oral     SpO2:  100%     Weight:       Height:           Physical Exam     General: well developed, well nourished, NAD  Neck: supple, symmetrical, trachea midline, no JVD  Cardiovascular: Heart: regular rate and rhythm, S1, S2 normal, no murmur, click, no rub or gallop.  Lungs: clear to auscultation bilaterally and normal respiratory effort  Chest Wall: no tenderness  Abdomen/Rectal: Abdomen: Non distended. Soft and lax.  Extremities: no edema, redness or tenderness in the calves or thighs. Pulses: 2+ and symmetric  Skin: Skin color, texture, turgor normal. No rashes or  lesions  Musculoskeletal: full range of motion of joints  Lymph Nodes: No cervical or supraclavicular adenopathy  Psych/Behavioral: Normal. Alert and oriented, appropriate affect.    Consults:   Consults         Status Ordering Provider     Inpatient consult to Cardiology  Once     Provider:  (Not yet assigned)    Final result STANISLAW NEVES     Inpatient consult to Interventional Cardiology  Once     Provider:  (Not yet assigned)    Final result INDIGO PEÑA     Inpatient consult to Kidney/Pancreas Transplant Medicine  Once     Provider:  (Not yet assigned)    Final result STANISLAW NEVES     Inpatient consult to Vascular Surgery  Once     Provider:  (Not yet assigned)    Final result STANISLAW NEVES          Significant Diagnostic Studies:     Recent Results (from the past 24 hour(s))   Calcium, ionized    Collection Time: 01/26/17  5:39 PM   Result Value Ref Range    Calcium, Ion 0.80 (L) 1.06 - 1.42 mmol/L   POCT glucose    Collection Time: 01/26/17  9:12 PM   Result Value Ref Range    POCT Glucose 168 (H) 70 - 110 mg/dL   Protein, urine, timed    Collection Time: 01/27/17 12:04 AM   Result Value Ref Range    Urine Volume 2000 mL    Urine Collection Duration 24 Hr    Protein, Urine 33 (H) 0 - 15 mg/dL    Urine Protein, Timed 660 (H) 0 - 100 mg/Spec   CBC with Automated Differential    Collection Time: 01/27/17  5:49 AM   Result Value Ref Range    WBC 11.67 3.90 - 12.70 K/uL    RBC 4.04 (L) 4.60 - 6.20 M/uL    Hemoglobin 10.5 (L) 14.0 - 18.0 g/dL    Hematocrit 31.0 (L) 40.0 - 54.0 %    MCV 77 (L) 82 - 98 fL    MCH 26.0 (L) 27.0 - 31.0 pg    MCHC 33.9 32.0 - 36.0 %    RDW 14.0 11.5 - 14.5 %    Platelets 221 150 - 350 K/uL    MPV 12.3 9.2 - 12.9 fL    Gran # 9.3 (H) 1.8 - 7.7 K/uL    Lymph # 1.6 1.0 - 4.8 K/uL    Mono # 0.6 0.3 - 1.0 K/uL    Eos # 0.0 0.0 - 0.5 K/uL    Baso # 0.01 0.00 - 0.20 K/uL    Gran% 80.0 (H) 38.0 - 73.0 %    Lymph% 13.7 (L) 18.0 - 48.0 %    Mono% 5.1 4.0 - 15.0 %     Eosinophil% 0.3 0.0 - 8.0 %    Basophil% 0.1 0.0 - 1.9 %    Differential Method Automated    PT/INR    Collection Time: 01/27/17  5:49 AM   Result Value Ref Range    Prothrombin Time 17.7 (H) 9.0 - 12.5 sec    INR 1.8 (H) 0.8 - 1.2   Basic Metabolic Panel (BMP)    Collection Time: 01/27/17  5:49 AM   Result Value Ref Range    Sodium 142 136 - 145 mmol/L    Potassium 3.5 3.5 - 5.1 mmol/L    Chloride 111 (H) 95 - 110 mmol/L    CO2 22 (L) 23 - 29 mmol/L    Glucose 127 (H) 70 - 110 mg/dL    BUN, Bld 29 (H) 8 - 23 mg/dL    Creatinine 1.6 (H) 0.5 - 1.4 mg/dL    Calcium 6.5 (LL) 8.7 - 10.5 mg/dL    Anion Gap 9 8 - 16 mmol/L    eGFR if African American 52.6 (A) >60 mL/min/1.73 m^2    eGFR if non African American 45.5 (A) >60 mL/min/1.73 m^2   Tacrolimus level    Collection Time: 01/27/17  5:49 AM   Result Value Ref Range    Tacrolimus Lvl 3.6 (L) 5.0 - 15.0 ng/mL   Magnesium    Collection Time: 01/27/17  5:49 AM   Result Value Ref Range    Magnesium 1.9 1.6 - 2.6 mg/dL   Basic metabolic panel    Collection Time: 01/27/17 12:56 PM   Result Value Ref Range    Sodium 141 136 - 145 mmol/L    Potassium 4.0 3.5 - 5.1 mmol/L    Chloride 110 95 - 110 mmol/L    CO2 23 23 - 29 mmol/L    Glucose 117 (H) 70 - 110 mg/dL    BUN, Bld 28 (H) 8 - 23 mg/dL    Creatinine 1.7 (H) 0.5 - 1.4 mg/dL    Calcium 6.9 (LL) 8.7 - 10.5 mg/dL    Anion Gap 8 8 - 16 mmol/L    eGFR if African American 48.9 (A) >60 mL/min/1.73 m^2    eGFR if non  42.3 (A) >60 mL/min/1.73 m^2         Pending Diagnostic Studies:     Procedure Component Value Units Date/Time    PTH, intact [[id number]]     Order Status:  Sent Lab Status:  No result     Specimen:  Blood from Blood     Protein electrophoresis, urine [[id number]] Collected:  01/27/17 0004    Order Status:  Sent Lab Status:  In process Updated:  01/27/17 0047    Specimen:  Urine from Urine, Clean Catch     Narrative:       Collection Start Time->11:37 PM   01/26/17  Collection Stop Time->11:37 PM    01/27/2017    Vitamin D [[id number]]     Order Status:  Sent Lab Status:  No result     Specimen:  Blood from Blood         Final Active Diagnoses:    Diagnosis Date Noted POA    PRINCIPAL PROBLEM:  Acute pericarditis [I30.9] 01/23/2017 Yes    Sepsis [A41.9] 01/23/2017 Yes    S/P kidney transplant [Z94.0]  Not Applicable    Elevated troponin [R79.89] 01/22/2017 Yes    Atrial fibrillation with rapid ventricular response [I48.91] 04/30/2015 Yes    SOB (shortness of breath) [R06.02] 04/30/2015 Yes    CKD (chronic kidney disease) stage 3, GFR 30-59 ml/min [N18.3] 01/17/2014 Yes    Hyperlipidemia [E78.5]  Yes    Hypertension [I10]  Yes    Immunodeficiency due to treatment with immunosuppressive medication [D89.9]  Yes      Problems Resolved During this Admission:    Diagnosis Date Noted Date Resolved POA    KATINA (acute kidney injury) [N17.9] 01/25/2017 01/27/2017 No      * Acute pericarditis  -troponins (mildly elevated) and EKG with new T-wave inversions for possible acute coronary syndrome, re-assessed and was determined to have pericarditis by cardiology who deferred LHC. EKG with no signs of pericarditis. Echo and CT scan with no significant pericardial fluid.   - pain much improved after receiving colchicine and steroids. Continue daily per renal dosing  - troponemia likely 2/2 from pericarditis with myocardial inflammation that has down trended.  -CTA reveals no PE, pleural effusions, pericardial effusion, or pneumonia.  Shows bibasilar atelectasis.  -BNP unremarkable, no signs of acute HF  - will discharge patient with prednisone taper.  Will get prednisone 40 for 4 days, then 30 for 4 days, then 20 for 4 days, then 10 for 4 days, then can resume home prednisone 5  - will prescribe for colchicine 0.6 mg PO BID per cardiology recommendations       Sepsis  -SIRS 3/4 on admission  -afebrile throughout stay   -WBC down to 16.5 on 1/26, likely 2/2 steroids   -tachycardic, tachypneic on admission now resolved    -no clear source, no pneumonia on CTA, patient received IV avelox once in ED  - no indication for infectious etiology at this time, likely from pericarditis  - BCx showed GPC on admission suspicious for contamination but started on vancomycin today over concern for immunosuppressed state, repeat cultures reveal NGTD, likely not infectious in etiology.  Will hold antibiotics at this time and continue monitoring     S/P kidney transplant  -transplant in 2005  -continue prograf at home dose, prednisone per pericarditis dosing for now at 50mg .  Per cardiology, plan to taper prednisone dose   -daily prograf levels  -hold mycophenolate while admitted and question for GPC bacteremia  -Cr elevated 2/2 either ASA or IV contrast, but decreasing, currently baseline at 1.6     -KMT following appreciate recommendations, will discharge on tacrolimus 4 mg PO BID and MMF as previous   - follow up with KMT in 1 week     Immunodeficiency due to treatment with immunosuppressive medication  - will restart prograf and MMF       Hypertension  - continue metoprolol, nifedipine, doxazosin, can restart spironolactone at home given resolution of KATINA  - hold lasix in setting of transplant kidney and contrast induced nephropathy, but restart once at home     Hyperlipidemia  - Decreasing lipitor to 40mg daily      CKD (chronic kidney disease) stage 3, GFR 30-59 ml/min  - CKD III on transplanted status and now KATINA on top, improving   - nephro and cardiac diet  - Hold ASA       SOB (shortness of breath)  -wheezing, improved with breathing treatments, continue duonebs q4H PRN  - likely 2/2 to pericarditis and pain, will continue NC oxygen and wean as tolerated        Atrial fibrillation with rapid ventricular response  - continue metoprolol for rate control   -1/26 INR 1.8, patient to take warfarin 7.5 mg PO dose tonight  -continue to monitoring at home     KATINA (acute kidney injury), resolved as of 1/27/2017  - Likely secondary to high dose ASA  and contras induce nephropathy from CTA done on admission to rule out aortic dissection  - resolving, continue daily CMPs        Discharged Condition: good    Disposition: Home or Self Care    Follow Up:  Follow-up Information     Follow up with Nagi Ameliabhanu- Transplant In 1 week.    Specialty:  Transplant    Why:  S/P kidney transplant 2005, increase in Prograf on discharge     Contact information:    [address]  [facility] [address] [phone]    Additional information:    [facility] - 1st Floor, Located by clinic tower elevators        Follow up with Scott Miranda Jr, MD In 2 weeks.    Specialty:  Internal Medicine    Why:  Post Hospital Stay     Contact information:    [address]  [facility] [address]  [phone]          Follow up with Shagufta Blunt MD On 3/3/2017.    Specialty:  Cardiology    Why:  8:00am    Contact information:    [address]  [facility] [address]  [phone]          Patient Instructions:     Diet general     Activity as tolerated     Call MD for:  severe uncontrolled pain     Call MD for:  temperature >100.4       Medications:  Reconciled Home Medications:   Current Discharge Medication List      START taking these medications    Details   colchicine 0.6 mg tablet Take 1 tablet (0.6 mg total) by mouth 2 (two) times daily.  Qty: 60 tablet, Refills: 2      !! predniSONE (DELTASONE) 10 MG tablet Take 1 tablet (10 mg total) by mouth once daily. Please take 4 Pills starting 1/28 to 1/31  Please take 3 Pills starting 2/1 to 2/4  Please take 2 Pills starting 2/5 to 2/8  Please take 1 Pill starting 2/9 to 2/12  Please continue home Prednisone 5 mg PO daily  Qty: 40 tablet, Refills: 0       !! - Potential duplicate medications found. Please discuss with provider.      CONTINUE these medications which have CHANGED    Details   atorvastatin (LIPITOR) 40 MG tablet Take 1 tablet (40 mg total) by mouth once daily.  Qty: 90 tablet,  Refills: 3      tacrolimus (PROGRAF) 1 MG Cap Take 4 capsules (4 mg total) by mouth every 12 (twelve) hours.  Qty: 240 capsule, Refills: 2         CONTINUE these medications which have NOT CHANGED    Details   aspirin (ADULT ASPIRIN EC LOW STRENGTH) 81 MG EC tablet Take 1 tablet by mouth Daily.      calcium carbonate (TUMS) 200 mg calcium (500 mg) chewable tablet Take 4 tablets by mouth once daily.       CALCIUM CARBONATE/VITAMIN D3 (VITAMIN D-3 ORAL) Take 1,000 Units by mouth 2 (two) times daily.      cyclobenzaprine (FLEXERIL) 5 MG tablet Take 1 tablet (5 mg total) by mouth 3 (three) times daily as needed for Muscle spasms.  Qty: 30 tablet, Refills: 0    Associated Diagnoses: Musculoskeletal strain      doxazosin (CARDURA) 4 MG tablet Take 6 mg by mouth every 12 (twelve) hours.      fexofenadine (ALLEGRA) 60 MG tablet Take 1 tablet by mouth Twice daily as needed.      fluticasone (FLONASE) 50 mcg/actuation nasal spray 1 spray by Each Nare route once daily.  Qty: 16 g, Refills: 6      furosemide (LASIX) 40 MG tablet Take 0.5 tablets (20 mg total) by mouth 2 (two) times daily.  Qty: 180 tablet, Refills: 2      metoprolol tartrate (LOPRESSOR) 50 MG tablet Take 1 tablet (50 mg total) by mouth 2 (two) times daily.  Qty: 180 tablet, Refills: 3      multivitamin (THERAGRAN) per tablet Take 1 tablet by mouth Daily.      mycophenolate (CELLCEPT) 250 mg Cap TAKE 2 CAPSULES BY MOUTH EVERY 12 HOURS TO PREVENT KIDNEY TRANSPLANT REJECTION  Qty: 360 capsule, Refills: 3    Associated Diagnoses: CKD (chronic kidney disease), stage III      nifedipine (ADALAT CC) 60 MG TbSR TAKE 1 TABLET BY MOUTH TWICE DAILY.  Qty: 180 tablet, Refills: 1      NTS STEP 1 21 mg/24 hr PLACE 1 PATCH ONTO THE SKIN ONCE DAILY.  Qty: 28 patch, Refills: 6      pantoprazole (PROTONIX) 40 MG tablet TAKE 1 TABLET BY MOUTH DAILY  Qty: 90 tablet, Refills: 3      paricalcitol (ZEMPLAR) 1 MCG capsule One po MWF  Qty: 90 capsule, Refills: 3      potassium chloride  SA (K-DUR,KLOR-CON) 20 MEQ tablet TAKE 1 TABLET BY MOUTH DAILY  Qty: 90 tablet, Refills: 3      !! predniSONE (DELTASONE) 5 MG tablet TAKE 1 TABLET BY MOUTH EVERY MORNING  Qty: 90 tablet, Refills: 3      spironolactone (ALDACTONE) 25 MG tablet Take 1 tablet (25 mg total) by mouth once daily.  Qty: 90 tablet, Refills: 3      warfarin (COUMADIN) 5 MG tablet Take 1 tablet (5mg.) by mouth daily, except 1.5 (7.5mg) on Wednesday,  Or as directed by [facility]  Qty: 40 tablet, Refills: 5    Comments: Refill request was faxed to [facility] pharmacy ph. # [phone], Fax # [phone]    Dr. Shagufta Blunt  Associated Diagnoses: Long-term (current) use of anticoagulants; Atrial fibrillation with rapid ventricular response       !! - Potential duplicate medications found. Please discuss with provider.      STOP taking these medications       hydrocodone-acetaminophen 5-325mg (NORCO) 5-325 mg per tablet Comments:   Reason for Stopping:             Time spent on the discharge of patient: 30 minutes    Jose Santacruz MD  Department of Hospital Medicine  [facility]

## 2017-01-27 NOTE — ASSESSMENT & PLAN NOTE
- continue metoprolol, nifedipine, doxazosin, can restart spironolactone at home given resolution of KATINA  - hold lasix in setting of transplant kidney and contrast induced nephropathy, but restart once at home

## 2017-01-27 NOTE — PROGRESS NOTES
Name: Ibrahima Phelps  Medical Record Number: [id number]  Date of Service: 01/27/2017  Note By: Brooks Chiu MD    RENAL TRANSPLANT PROGRESS NOTE      Reason for Follow-up: Reassessment of kidney transplant recipient and management of immunosuppression.    Summary: 61 y.o. male with history of ESRD requiring HD for 6 months prior to 1st kidney transplant (1992) which failed ~1996. He was back on RRT until undergoing second transplant / DDRT on 4/12/2005. He is on chronic IS with TAC, MMF, and prednisone. His baseline SCr is ~ 1.5 - 1.8 mg/dL. He also has a history of HTN, CAD, Afib on anticoagulation with warfarin, HLD, parathyroidectomy after 1st transplant, and prior pericarditis (1st 1995, required window; 2nd 2010 did not require intervention). He is an active smoker as well. He came to the ED with chief complaints of substernal chest pain/discomfort with radiation to the back, worsened with inspiration and movements since last Saturday. Underwent CTA due to concerns of aortic dissection 1/23/17 (75 cc of Omnipaque 350 IV) but no evidence of the latter; seen by cardiology, ACS less likely (pain unresolved with SLNG, pain present for 3 days) and recommended therapy for pericarditis. Started on steroid and colchicine; ASA x 1 given.     Interval History: No acute events overnight. Pain resolved. No SOB. SCr on decreasing trend. UOP adequate. Low calcium noted, IV replacement requested.     PMHx:  Past Medical History   Diagnosis Date    Atrial fibrillation     CAD (coronary artery disease)     Diverticulosis     Hyperlipidemia     Hypertension     Immunodeficiency due to treatment with immunosuppressive medication     Metabolic bone disease     Pericarditis     S/P kidney transplant     Thrombocytopenia     Thyroid disease     Unspecified disorder of kidney and ureter      Stage IIICKD       Medications:   Scheduled Meds:   atorvastatin  40 mg Oral Daily    calcium chloride  1 g Intravenous Once     colchicine  0.6 mg Oral Daily    doxazosin  6 mg Oral Daily    metoprolol tartrate  50 mg Oral BID    mycophenolate  500 mg Oral BID    nicotine  1 patch Transdermal Daily    nifedipine  60 mg Oral Daily    pantoprazole  40 mg Oral Daily    potassium chloride  30 mEq Oral Q4H    predniSONE  50 mg Oral Daily    tacrolimus  3 mg Oral Daily    tacrolimus  4 mg Oral Daily    warfarin  5 mg Oral Daily       Continuous Infusions:     PRN Meds:.albuterol-ipratropium 2.5mg-0.5mg/3mL, dextrose 50%, dextrose 50%, glucagon (human recombinant), glucose, glucose, HYDROmorphone, flu vac [id number] 36mos up(PF), ondansetron, ondansetron, ramelteon    Review of Systems:   10 point review of systems was conducted and was negative except as mentioned in the HPI.    Physical Exam:  Vitals:  Vitals:    01/27/17 0730   BP:    Pulse: 64   Resp: 18   Temp: 97.8 °F (36.6 °C)       Temp:  [97.7 °F (36.5 °C)-98.7 °F (37.1 °C)] 97.8 °F (36.6 °C)  Pulse:  [57-90] 64  Resp:  [18-20] 18  SpO2:  [96 %-100 %] 100 %  BP: (138-164)/(79-85) 163/81        Exam  General: No acute distress. Speaking in full sentences.   HEENT: Normocephalic, atraumatic, EOM's intact bilaterally, moist mucous membranes, no oral ulcerations/lesions  Neck: Supple, symmetrical, trachea midline, no thyromegaly, no JVD  Respiratory: Clear to auscultation bilaterally, respirations unlabored, no rales/rhonchi/wheezing  Cardiovacular: Regular rate and rhythm, S1, S2 normal, KAILASH heard best over LSB  Gastrointestinal: Soft, non-tender, bowel sounds normal, no hepatosplenomegaly  Renal allograft exam: No tenderness, no bruits, normal exam  Musculoskeletal: No knee or ankle joint tenderness or swelling.   Extremities: No clubbing or cyanosis of bilateral upper extremities; no lower extremity edema bilaterally, radial pulses 2+ bilaterally, symmetric  Skin: warm and dry; no rash on exposed skin  Neurologic: CN grossly intact    Labs:  Lab Results   Component Value Date     WBC 11.67 01/27/2017    HGB 10.5 (L) 01/27/2017    HCT 31.0 (L) 01/27/2017     01/27/2017    K 3.5 01/27/2017     (H) 01/27/2017    CO2 22 (L) 01/27/2017    BUN 29 (H) 01/27/2017    CREATININE 1.6 (H) 01/27/2017    EGFRNONAA 45.5 (A) 01/27/2017    GLUCOSE 121 (H) 04/12/2005    CALCIUM 6.5 (LL) 01/27/2017    PHOS 4.5 01/24/2017    MG 1.9 01/26/2017    ALBUMIN 2.7 (L) 01/24/2017    AST 18 01/23/2017    ALT 16 01/23/2017       Assessment/Plan:  61 y.o. male with:    # History of ESRD secondary to HTN s/p #2 DDRT on 4/12/2005:   - Baseline SCr ~1.5 - 1.8 mg/dL, currently above baseline. Increase most likely from renal vasoconstriction (ASA, iodinated contrast, etc). Non oliguric with SCr apparently reaching plateau.   - Strict I/O's, daily weights.       # Immunosuppression Management:  - induction with THYMO and solumderol  - continue Prograf 4 mg am, 3 mg pm. Today's through level adequate. Will need to monitor for toxicities; goal [id number] trough level is 4 - 6.   - MMF resumed today, 500 mg bid.   - On high dose prednisone for pericarditis treatment; once finished, continue prednisone 5 mg daily.      # Anemia, acute on chronic: Hb currently in the 9 range; previously ~12g/dL.   - Consider iron studies.   - continue to monitor     # HTN: BPs stable  - continue to monitor     # Lytes:  - For hypocalcemia, will resume outpatient supplements. He referred was not taking tums due to size; will switch to liquid preparation.   - continue to monitor calcium, potassium, phos, and magnesium     Brooks Chiu M.D.   Transplant Nephrology Fellow   [phone]

## 2017-01-27 NOTE — SUBJECTIVE & OBJECTIVE
Interval History:   Patient reports improvement of chest pain from admission, but still notes pain level above his baseline.  Received dilaudid overnight for episode of pain which relieved pain.  Otherwise, denies fevers, chills, N/V, shortness of breath, or abdominal pain.      Review of Systems     Denies any fevers or chills. He denies any abdominal pain, n/v. No lower ext swelling or pain.    Objective:     Vital Signs (Most Recent):  Temp: 98.7 °F (37.1 °C) (01/26/17 1200)  Pulse: 85 (01/26/17 1700)  Resp: 20 (01/26/17 1200)  BP: 138/80 (01/26/17 1200)  SpO2: 97 % (01/26/17 1200) Vital Signs (24h Range):  Temp:  [97.9 °F (36.6 °C)-98.7 °F (37.1 °C)] 98.7 °F (37.1 °C)  Pulse:  [] 85  Resp:  [18-20] 20  SpO2:  [92 %-97 %] 97 %  BP: (128-155)/(61-80) 138/80     Weight: 117.6 kg (259 lb 4.2 oz)  Body mass index is 34.21 kg/(m^2).    Intake/Output Summary (Last 24 hours) at 01/26/17 1826  Last data filed at 01/26/17 1400   Gross per 24 hour   Intake              638 ml   Output             1400 ml   Net             -762 ml      Physical Exam    General: well developed, well nourished, NAD  Neck: supple, symmetrical, trachea midline, no JVD  Cardiovascular: Heart: regular rate and rhythm, S1, S2 normal, no murmur, click, no rub or gallop.  Lungs: clear to auscultation bilaterally and normal respiratory effort  Chest Wall: no tenderness  Abdomen/Rectal: Abdomen: Non distended. Soft and lax.  Extremities: no edema, redness or tenderness in the calves or thighs. Pulses: 2+ and symmetric  Skin: Skin color, texture, turgor normal. No rashes or lesions  Musculoskeletal: full range of motion of joints  Lymph Nodes: No cervical or supraclavicular adenopathy  Psych/Behavioral: Normal. Alert and oriented, appropriate affect.    Significant Labs:     Recent Results (from the past 24 hour(s))   POCT glucose    Collection Time: 01/25/17 10:30 PM   Result Value Ref Range    POCT Glucose 148 (H) 70 - 110 mg/dL   CBC with  Automated Differential    Collection Time: 01/26/17  4:27 AM   Result Value Ref Range    WBC 16.53 (H) 3.90 - 12.70 K/uL    RBC 4.25 (L) 4.60 - 6.20 M/uL    Hemoglobin 10.8 (L) 14.0 - 18.0 g/dL    Hematocrit 32.1 (L) 40.0 - 54.0 %    MCV 76 (L) 82 - 98 fL    MCH 25.4 (L) 27.0 - 31.0 pg    MCHC 33.6 32.0 - 36.0 %    RDW 13.9 11.5 - 14.5 %    Platelets 206 150 - 350 K/uL    MPV 12.2 9.2 - 12.9 fL    Gran # 14.8 (H) 1.8 - 7.7 K/uL    Lymph # 1.3 1.0 - 4.8 K/uL    Mono # 0.4 0.3 - 1.0 K/uL    Eos # 0.0 0.0 - 0.5 K/uL    Baso # 0.00 0.00 - 0.20 K/uL    Gran% 89.2 (H) 38.0 - 73.0 %    Lymph% 7.9 (L) 18.0 - 48.0 %    Mono% 2.4 (L) 4.0 - 15.0 %    Eosinophil% 0.0 0.0 - 8.0 %    Basophil% 0.0 0.0 - 1.9 %    Differential Method Automated    PT/INR    Collection Time: 01/26/17  4:27 AM   Result Value Ref Range    Prothrombin Time 22.1 (H) 9.0 - 12.5 sec    INR 2.2 (H) 0.8 - 1.2   Basic Metabolic Panel (BMP)    Collection Time: 01/26/17  4:27 AM   Result Value Ref Range    Sodium 139 136 - 145 mmol/L    Potassium 4.0 3.5 - 5.1 mmol/L    Chloride 108 95 - 110 mmol/L    CO2 19 (L) 23 - 29 mmol/L    Glucose 117 (H) 70 - 110 mg/dL    BUN, Bld 36 (H) 8 - 23 mg/dL    Creatinine 2.0 (H) 0.5 - 1.4 mg/dL    Calcium 6.9 (LL) 8.7 - 10.5 mg/dL    Anion Gap 12 8 - 16 mmol/L    eGFR if African American 40.2 (A) >60 mL/min/1.73 m^2    eGFR if non  34.7 (A) >60 mL/min/1.73 m^2   Tacrolimus level    Collection Time: 01/26/17  4:27 AM   Result Value Ref Range    Tacrolimus Lvl 5.0 5.0 - 15.0 ng/mL   Vancomycin, random    Collection Time: 01/26/17  4:27 AM   Result Value Ref Range    Vancomycin, Random 7.5 Not established ug/mL   Magnesium    Collection Time: 01/26/17 11:52 AM   Result Value Ref Range    Magnesium 1.9 1.6 - 2.6 mg/dL

## 2017-01-27 NOTE — ASSESSMENT & PLAN NOTE
- continue metoprolol for rate control   -1/26 INR 1.8, patient to take warfarin 7.5 mg PO dose tonight  -continue to monitoring at home

## 2017-01-27 NOTE — ASSESSMENT & PLAN NOTE
- continue metoprolol for rate control   -1/26 INR 2.2, will resume warfarin 5 mg PO daily    -continue to monitor INR daily

## 2017-01-27 NOTE — ASSESSMENT & PLAN NOTE
-troponins (mildly elevated) and EKG with new T-wave inversions for possible acute coronary syndrome, re-assessed and was determined to have pericarditis by cardiology who deferred LHC. EKG with no signs of pericarditis. Echo and CT scan with no significant pericardial fluid.   - pain much improved after receiving colchicine and steroids. Continue daily per renal dosing  - troponemia likely 2/2 from pericarditis with myocardial inflammation that has down trended.  -CTA reveals no PE, pleural effusions, pericardial effusion, or pneumonia.  Shows bibasilar atelectasis.  -BNP unremarkable, no signs of acute HF  - will discharge patient with prednisone taper.  Will get prednisone 40 for 4 days, then 30 for 4 days, then 20 for 4 days, then 10 for 4 days, then can resume home prednisone 5  - will prescribe for colchicine 0.6 mg PO BID per cardiology recommendations

## 2017-01-27 NOTE — ASSESSMENT & PLAN NOTE
-transplant in 2005  -continue prograf at home dose, prednisone per pericarditis dosing for now at 50mg .  Per cardiology, plan to taper prednisone dose   -daily prograf levels  -hold mycophenolate while admitted and question for GPC bacteremia  -Cr elevated 2/2 either ASA or IV contrast, but decreasing, currently baseline at 1.6     -KMT following appreciate recommendations, will discharge on tacrolimus 4 mg PO BID and MMF as previous   - follow up with KMT in 1 week

## 2017-01-27 NOTE — ASSESSMENT & PLAN NOTE
-troponins (mildly elevated) and EKG with new T-wave inversions for possible acute coronary syndrome, re-assessed and was determined to have pericarditis by cardiology who deferred C. EKG with no signs of pericarditis. Echo and CT scan with no significant pericardial fluid.   - pain much improved after receiving colchicine and steroids. Continue daily per renal dosing.  - troponemia likely 2/2 from pericarditis with myocardial inflammation that has down trended.  -CTA reveals no PE, pleural effusions, pericardial effusion, or pneumonia.  Shows bibasilar atelectasis.  -BNP unremarkable, no signs of acute HF  -elevated ,

## 2017-01-28 LAB
BACTERIA BLD CULT: NORMAL

## 2017-01-30 ENCOUNTER — PATIENT OUTREACH (OUTPATIENT)
Dept: ADMINISTRATIVE | Facility: CLINIC | Age: 62
End: 2017-01-30
Payer: COMMERCIAL

## 2017-01-30 LAB
BACTERIA BLD CULT: NORMAL
BACTERIA BLD CULT: NORMAL
PATHOLOGIST INTERPRETATION SPE: NORMAL
PATHOLOGIST INTERPRETATION UPE: NORMAL
PROT PATTERN UR ELPH-IMP: NORMAL

## 2017-01-30 NOTE — PROGRESS NOTES
C3 nurse attempted to contact patient. No answer. The following message was left for the patient to return the call:  Good afternoon I am a nurse calling on behalf of [facility] from the [facility].  This is a Transitional Care Call for Ibrahima Phelps. When you have a moment please contact us at [phone] or [phone] Monday through Friday, between the hours of 8 am to 4 pm. We look forward to speaking with you. On behalf of [facility] have a nice day.    The patient does not have a scheduled HOSFU appointment within 7-14 days post hospital discharge date 01/27/17. Message sent to Physician staff to assist with HOSFU appointment scheduling.

## 2017-01-30 NOTE — PATIENT INSTRUCTIONS
Discharge Instructions for Angina  You have been diagnosed with a type of chest pain called angina. Angina occurs when not enough oxygen reaches the heart muscle. It is most often felt under your breastbone, in your left shoulder, or down your left arm. The pain may even spread to your jaw or back. Exercise, increased activity, emotional upset, or stress can trigger this pain. With proper treatment and lifestyle changes to reduce risk factors, most people with angina are able to maintain a full and active life.  Managing risk factors  Your healthcare provider will work with you to make lifestyle changes as needed. This can help prevent worsening of coronary artery disease, which is likely the cause of your angina.  Coronary artery disease is a narrowing of the blood vessels that supply oxygen and nutrients to the heart muscle. The blood vessels can also spasm and reduce the oxygen reaching the heart muscle from the narrowing inside of the artery. Managing your risk factors may prevent both of these causes of narrowing of your arteries.  Diet  Your healthcare provider will give you information on dietary changes that you may need to make, based on your situation. Your provider may recommend that you see a registered dietitian for help with diet changes. Try these changes to start:    · Reduce fat and cholesterol intake   · Reduce sodium (salt) intake, especially if you have high blood pressure   · Increase your intake of fresh vegetables and fruits   · Eat lean proteins, such as fish, poultry, and legumes (beans and peas) and eat less red meat and processed meats  · Use low-fat dairy products   · Use vegetable and nut oils in limited amounts   · Limit sweets and processed foods such as chips, cookies, and baked goods  · Limit sodas and high calorie drinks  · Limit greasy and fried foods, or those high in saturated fat  · Limit alcohol intake  Physical activity  Your healthcare provider may recommend that you  increase your physical activity if you have not been as active as possible. This may include moderate to vigorous intensity physical activity for at least 30 to 60 minutes each day for at least 5 to 7 days per week. A few examples of moderate to vigorous intensity physical activity include:   · Walking at a brisk pace, about 3 to 4 miles per hour  · Jogging or running  · Swimming or water aerobics  · Hiking  · Dancing  · Martial arts  · Tennis  · Riding a bike  Don't start or increase your activity level without first seeing your healthcare provider.  Weight management  If you are overweight, your healthcare provider will work with you to lose weight and lower your BMI (body mass index) to a normal or near-normal level. Making diet changes and increasing physical activity can help. A healthy and reasonable goal for weight loss is to lose 10% of your current weight per year.  Smoking  If you smoke, break the smoking habit. Enroll in a stop-smoking program to improve your chances of success. You can also join a support group. Talk to your healthcare provider about nicotine replacement products or medicines to help you quit.  Stress  Learn ways to manage stress to help you deal with stress in your home and work life. Your ability to prioritize your health depends on your mental health and focus. Feeling supported in the rest of your life is key to achieving success with your health.  Managing medicines  · Keep a record of your episodes of chest pain. Take these with you when you see your healthcare provider.  · Take your medicines exactly as directed. Dont skip doses. If you miss a dose, call your healthcare provider right away.  · If you have unwanted side effects from your medicine, tell your healthcare provider right away.  Taking nitroglycerin  · Keep your nitroglycerin with you at all times.  · If youre on nitroglycerin, dont take medicines used to treat erectile dysfunction (such as sildenafil) at all. These  can react with nitroglycerin and cause your blood pressure to drop to a dangerous or even life-threatening level.  · If you use nitroglycerin to prevent angina attacks, follow your healthcare providers instructions for your kind of nitroglycerin (pill, spray, or skin patch).  · If you use nitroglycerin to stop an angina attack, follow these steps:  ¨ Sit down (you may become dizzy).  ¨ Place 1 tablet under your tongue, or between your lip and gum, or between your cheek and gum. Let the tablet dissolve completely; do not chew or swallow the tablet.  ¨ If you use a spray, then spray once on or under your tongue. Do not inhale. Close your mouth. Wait a few seconds before you swallow and don't rinse your mouth for 5 to 10 minutes.  ¨ After taking 1 tablet or spraying once, continue sitting for 5 minutes.  ¨ If the angina goes away completely, rest awhile and follow your provider's orders about returning to your normal routine.  ¨ If the chest pain or pressure continues, CALL 911 immediately. Do NOT delay. You may be having a heart attack (acute myocardial infarction, or AMI)!  ¨ You may be told by your doctor to CALL 911 after taking 2 or 3 tables or sprays of nitroglycerin (spaced 5 minutes apart) and the chest pain or pressure is still present 5 minutes after the last dose. Do not take more than 3 tablets, or spray more than 3 times, within 15 minutes.   When to call your doctor  Call your doctor immediately if you have any of these:  · Severe headache  · Severe dizziness, or fainting  · Nausea or vomiting  · Fast heartbeat (higher than 100 beats per minute)  · Swollen ankles  · Weakness  · Angina attacks that last longer, occur more often, or are more severe than in the past  · Angina that occurs at rest, wakes you up out of sleep, or does not resolve   © 3098-4585 [employer]. [address] [place] [address], [place] [address]. All rights reserved. This information is not intended as a substitute for  professional medical care. Always follow your healthcare professional's instructions.

## 2017-02-01 ENCOUNTER — TELEPHONE (OUTPATIENT)
Dept: NEPHROLOGY | Facility: CLINIC | Age: 62
End: 2017-02-01

## 2017-02-01 DIAGNOSIS — Z94.0 S/P KIDNEY TRANSPLANT: Primary | ICD-10-CM

## 2017-02-01 NOTE — TELEPHONE ENCOUNTER
Pt scheduled for hospital f/up this Friday but needs labs prior, to include a Prograf level. Please schedule for him, orders in. Thank you

## 2017-02-02 ENCOUNTER — LAB VISIT (OUTPATIENT)
Dept: LAB | Facility: HOSPITAL | Age: 62
End: 2017-02-02
Payer: COMMERCIAL

## 2017-02-02 DIAGNOSIS — Z94.0 S/P KIDNEY TRANSPLANT: ICD-10-CM

## 2017-02-02 LAB
ALBUMIN SERPL BCP-MCNC: 3 G/DL
ANION GAP SERPL CALC-SCNC: 5 MMOL/L
ANISOCYTOSIS BLD QL SMEAR: SLIGHT
BASOPHILS # BLD AUTO: ABNORMAL K/UL
BASOPHILS NFR BLD: 0 %
BUN SERPL-MCNC: 20 MG/DL
CALCIUM SERPL-MCNC: 7.6 MG/DL
CHLORIDE SERPL-SCNC: 107 MMOL/L
CO2 SERPL-SCNC: 28 MMOL/L
CREAT SERPL-MCNC: 1.4 MG/DL
DIFFERENTIAL METHOD: ABNORMAL
EOSINOPHIL # BLD AUTO: ABNORMAL K/UL
EOSINOPHIL NFR BLD: 1 %
ERYTHROCYTE [DISTWIDTH] IN BLOOD BY AUTOMATED COUNT: 14.9 %
EST. GFR  (AFRICAN AMERICAN): >60 ML/MIN/1.73 M^2
EST. GFR  (NON AFRICAN AMERICAN): 53.5 ML/MIN/1.73 M^2
GLUCOSE SERPL-MCNC: 98 MG/DL
HCT VFR BLD AUTO: 40 %
HGB BLD-MCNC: 12.7 G/DL
LYMPHOCYTES # BLD AUTO: ABNORMAL K/UL
LYMPHOCYTES NFR BLD: 20 %
MCH RBC QN AUTO: 25.8 PG
MCHC RBC AUTO-ENTMCNC: 31.8 %
MCV RBC AUTO: 81 FL
METAMYELOCYTES NFR BLD MANUAL: 1 %
MONOCYTES # BLD AUTO: ABNORMAL K/UL
MONOCYTES NFR BLD: 4 %
MYELOCYTES NFR BLD MANUAL: 1 %
NEUTROPHILS NFR BLD: 72 %
NEUTS BAND NFR BLD MANUAL: 1 %
OVALOCYTES BLD QL SMEAR: ABNORMAL
PHOSPHATE SERPL-MCNC: 3.8 MG/DL
PLATELET # BLD AUTO: 246 K/UL
PMV BLD AUTO: 11.6 FL
POIKILOCYTOSIS BLD QL SMEAR: SLIGHT
POLYCHROMASIA BLD QL SMEAR: ABNORMAL
POTASSIUM SERPL-SCNC: 3.8 MMOL/L
PTH-INTACT SERPL-MCNC: 81 PG/ML
RBC # BLD AUTO: 4.93 M/UL
SODIUM SERPL-SCNC: 140 MMOL/L
TACROLIMUS BLD-MCNC: 4 NG/ML
WBC # BLD AUTO: 11.83 K/UL

## 2017-02-02 PROCEDURE — 36415 COLL VENOUS BLD VENIPUNCTURE: CPT

## 2017-02-02 PROCEDURE — 83970 ASSAY OF PARATHORMONE: CPT

## 2017-02-02 PROCEDURE — 80197 ASSAY OF TACROLIMUS: CPT

## 2017-02-02 PROCEDURE — 85007 BL SMEAR W/DIFF WBC COUNT: CPT

## 2017-02-02 PROCEDURE — 85027 COMPLETE CBC AUTOMATED: CPT

## 2017-02-02 PROCEDURE — 80069 RENAL FUNCTION PANEL: CPT

## 2017-02-03 ENCOUNTER — OFFICE VISIT (OUTPATIENT)
Dept: NEPHROLOGY | Facility: CLINIC | Age: 62
End: 2017-02-03
Payer: COMMERCIAL

## 2017-02-03 ENCOUNTER — HOSPITAL ENCOUNTER (OUTPATIENT)
Dept: CARDIOLOGY | Facility: CLINIC | Age: 62
Discharge: HOME | End: 2017-02-03
Payer: COMMERCIAL

## 2017-02-03 VITALS
BODY MASS INDEX: 32.46 KG/M2 | SYSTOLIC BLOOD PRESSURE: 146 MMHG | DIASTOLIC BLOOD PRESSURE: 78 MMHG | WEIGHT: 246.06 LBS | HEART RATE: 62 BPM | OXYGEN SATURATION: 99 % | TEMPERATURE: 98 F

## 2017-02-03 DIAGNOSIS — D84.821 IMMUNODEFICIENCY DUE TO TREATMENT WITH IMMUNOSUPPRESSIVE MEDICATION: ICD-10-CM

## 2017-02-03 DIAGNOSIS — I31.9 PERICARDITIS, UNSPECIFIED CHRONICITY, UNSPECIFIED TYPE: Primary | ICD-10-CM

## 2017-02-03 DIAGNOSIS — N18.30 CKD (CHRONIC KIDNEY DISEASE) STAGE 3, GFR 30-59 ML/MIN: ICD-10-CM

## 2017-02-03 DIAGNOSIS — Z72.0 TOBACCO USE: ICD-10-CM

## 2017-02-03 DIAGNOSIS — I25.83 CORONARY ARTERY DISEASE DUE TO LIPID RICH PLAQUE: ICD-10-CM

## 2017-02-03 DIAGNOSIS — I10 ESSENTIAL HYPERTENSION: ICD-10-CM

## 2017-02-03 DIAGNOSIS — Z94.0 S/P KIDNEY TRANSPLANT: ICD-10-CM

## 2017-02-03 DIAGNOSIS — I31.9 PERICARDITIS, UNSPECIFIED CHRONICITY, UNSPECIFIED TYPE: ICD-10-CM

## 2017-02-03 DIAGNOSIS — I30.9 ACUTE PERICARDITIS, UNSPECIFIED TYPE: ICD-10-CM

## 2017-02-03 DIAGNOSIS — I25.10 CORONARY ARTERY DISEASE DUE TO LIPID RICH PLAQUE: ICD-10-CM

## 2017-02-03 DIAGNOSIS — N25.81 SECONDARY RENAL HYPERPARATHYROIDISM: ICD-10-CM

## 2017-02-03 DIAGNOSIS — Z79.899 IMMUNODEFICIENCY DUE TO TREATMENT WITH IMMUNOSUPPRESSIVE MEDICATION: ICD-10-CM

## 2017-02-03 PROCEDURE — 99214 OFFICE O/P EST MOD 30 MIN: CPT | Mod: S$GLB,,, | Performed by: INTERNAL MEDICINE

## 2017-02-03 PROCEDURE — 99999 PR PBB SHADOW E&M-EST. PATIENT-LVL II: CPT | Mod: PBBFAC,,, | Performed by: INTERNAL MEDICINE

## 2017-02-03 PROCEDURE — 3078F DIAST BP <80 MM HG: CPT | Mod: S$GLB,,, | Performed by: INTERNAL MEDICINE

## 2017-02-03 PROCEDURE — 93000 ELECTROCARDIOGRAM COMPLETE: CPT | Mod: S$GLB,,, | Performed by: INTERNAL MEDICINE

## 2017-02-03 PROCEDURE — 3077F SYST BP >= 140 MM HG: CPT | Mod: S$GLB,,, | Performed by: INTERNAL MEDICINE

## 2017-02-03 RX ORDER — ATORVASTATIN CALCIUM 10 MG/1
10 TABLET, FILM COATED ORAL DAILY
COMMUNITY
End: 2018-02-12 | Stop reason: SDUPTHER

## 2017-02-03 RX ORDER — NIFEDIPINE 60 MG/1
TABLET, EXTENDED RELEASE ORAL
Qty: 180 TABLET | Refills: 3 | Status: SHIPPED | OUTPATIENT
Start: 2017-02-03 | End: 2018-01-15 | Stop reason: SDUPTHER

## 2017-02-03 RX ORDER — CALCIUM CARBONATE 1250 MG/5ML
1000 SUSPENSION ORAL 2 TIMES DAILY
Qty: 473 ML | Refills: 11 | Status: SHIPPED | OUTPATIENT
Start: 2017-02-03 | End: 2018-06-11

## 2017-02-03 NOTE — PROGRESS NOTES
Subjective:       Patient ID: Ibrahima Phelps is a 61 y.o. Black or  male who presents for follow-up evaluation of Chronic Kidney Disease    HPI     He was hospitalized for about a week for acute pericarditis. He self-discontinued the colchicine due to cramps. He feels great today, no CP and no SOB. No edema. He was given calcium supplementation while in the hospital. Since he has difficulty chewing Tums the liquid was prescribed. No fever. No allograft pain nor problems with urination. Still smoking    Review of Systems   Constitutional: Negative for activity change, appetite change, fatigue and unexpected weight change.   HENT: Negative for facial swelling.    Respiratory: Negative for shortness of breath.    Cardiovascular: Negative for chest pain and leg swelling.   Gastrointestinal: Positive for abdominal pain (from colchicine).   Genitourinary: Negative for difficulty urinating, dysuria and hematuria.   Musculoskeletal: Negative for arthralgias.   Neurological: Negative for weakness.       Objective:      Physical Exam   Constitutional: He is oriented to person, place, and time. He appears well-developed and well-nourished. No distress.   Neck: No JVD present.   Cardiovascular: S1 normal and S2 normal.  Exam reveals no friction rub.    Pulmonary/Chest: Breath sounds normal. He has no wheezes. He has no rales.   Abdominal: Soft.   Musculoskeletal: He exhibits no edema.   Neurological: He is alert and oriented to person, place, and time.   Skin: Skin is warm and dry.   Psychiatric: He has a normal mood and affect.   Vitals reviewed.      Assessment:       1. Pericarditis, unspecified chronicity, unspecified type    2. CKD (chronic kidney disease) stage 3, GFR 30-59 ml/min    3. Essential hypertension    4. S/P kidney transplant    5. Immunodeficiency due to treatment with immunosuppressive medication    6. Tobacco use    7. Secondary renal hyperparathyroidism    8. Coronary artery disease due to  lipid rich plaque    9. Acute pericarditis, unspecified type        Plan:             ESRD s/p kidney transplant and stable allograft function. No change to IS    HTN is controlled at home. 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

## 2017-02-10 ENCOUNTER — OFFICE VISIT (OUTPATIENT)
Dept: CARDIOLOGY | Facility: CLINIC | Age: 62
End: 2017-02-10
Payer: COMMERCIAL

## 2017-02-10 ENCOUNTER — ANTI-COAG VISIT (OUTPATIENT)
Dept: CARDIOLOGY | Facility: CLINIC | Age: 62
End: 2017-02-10
Payer: COMMERCIAL

## 2017-02-10 VITALS
SYSTOLIC BLOOD PRESSURE: 132 MMHG | DIASTOLIC BLOOD PRESSURE: 68 MMHG | HEART RATE: 65 BPM | WEIGHT: 245.13 LBS | HEIGHT: 73 IN | BODY MASS INDEX: 32.49 KG/M2

## 2017-02-10 DIAGNOSIS — I80.9 PHLEBITIS: ICD-10-CM

## 2017-02-10 DIAGNOSIS — Z79.01 LONG TERM (CURRENT) USE OF ANTICOAGULANTS: Primary | ICD-10-CM

## 2017-02-10 DIAGNOSIS — Z94.0 H/O KIDNEY TRANSPLANT: ICD-10-CM

## 2017-02-10 DIAGNOSIS — Z92.29 HX: LONG TERM ANTICOAGULANT USE: ICD-10-CM

## 2017-02-10 DIAGNOSIS — E78.2 MIXED HYPERLIPIDEMIA: ICD-10-CM

## 2017-02-10 DIAGNOSIS — I30.9 ACUTE PERICARDITIS, UNSPECIFIED TYPE: Primary | ICD-10-CM

## 2017-02-10 DIAGNOSIS — I25.10 CORONARY ARTERY DISEASE INVOLVING NATIVE CORONARY ARTERY OF NATIVE HEART WITHOUT ANGINA PECTORIS: ICD-10-CM

## 2017-02-10 DIAGNOSIS — I48.91 ATRIAL FIBRILLATION WITH RAPID VENTRICULAR RESPONSE: ICD-10-CM

## 2017-02-10 PROBLEM — R79.89 ELEVATED TROPONIN: Status: RESOLVED | Noted: 2017-01-22 | Resolved: 2017-02-10

## 2017-02-10 LAB — INR PPP: 3.7 (ref 2–3)

## 2017-02-10 PROCEDURE — 85610 PROTHROMBIN TIME: CPT | Mod: QW,S$GLB,, | Performed by: PHARMACIST

## 2017-02-10 PROCEDURE — 3078F DIAST BP <80 MM HG: CPT | Mod: S$GLB,,, | Performed by: INTERNAL MEDICINE

## 2017-02-10 PROCEDURE — 99211 OFF/OP EST MAY X REQ PHY/QHP: CPT | Mod: 25,S$GLB,, | Performed by: PHARMACIST

## 2017-02-10 PROCEDURE — 99999 PR PBB SHADOW E&M-EST. PATIENT-LVL III: CPT | Mod: PBBFAC,,, | Performed by: INTERNAL MEDICINE

## 2017-02-10 PROCEDURE — 3075F SYST BP GE 130 - 139MM HG: CPT | Mod: S$GLB,,, | Performed by: INTERNAL MEDICINE

## 2017-02-10 PROCEDURE — 99214 OFFICE O/P EST MOD 30 MIN: CPT | Mod: S$GLB,,, | Performed by: INTERNAL MEDICINE

## 2017-02-10 RX ORDER — COLCHICINE 0.6 MG/1
0.6 TABLET ORAL
COMMUNITY
End: 2017-10-02

## 2017-02-10 NOTE — MR AVS SNAPSHOT
[facility] - Cardiology  [address]  [place] [address]  Phone: [phone]                  Ibrahima Phelps   2/10/2017 8:30 AM   Office Visit    Description:  Male : 1955   Provider:  Shagufta Blunt MD   Department:  [facility] - Cardiology           Reason for Visit     Acute pericarditis     Blood Infection           Diagnoses this Visit        Comments    Acute pericarditis, unspecified type    -  Primary     Coronary artery disease involving native coronary artery of native heart without angina pectoris         Phlebitis         Mixed hyperlipidemia         H/O kidney transplant         HX: long term anticoagulant use                To Do List           Future Appointments        Provider Department Dept Phone    2017 9:40 AM Scott Miranda Jr., MD [facility] - Internal Medicine [phone]    2017 8:00 AM Jeffery Corbin, PharmD [facility] Coumadin [phone]    2017 7:00 AM SPECIMEN, MAIN CAMPUS [facility] [phone]    2017 7:05 AM SPECIMEN, MAIN CAMPUS [facility] [phone]    2017 8:20 AM Emre Latif MD [facility] Nephrology [phone]      Goals (5 Years of Data)     None      Follow-Up and Disposition     Return in about 3 months (around 5/10/2017).      [facility]     [facility] Care [facility] - 24/7 Assistance  Registered nurses in the [facility] provide clinical advisement, health education, appointment booking, and other advisory services.  Call for this free service at [phone].             Medications           Message regarding Medications     Verify the changes and/or additions to your medication regime listed below are the same as discussed with your clinician today.  If any of these changes or additions are incorrect, please notify your healthcare provider.             Verify that the below list of medications is an accurate representation of  the medications you are currently taking.  If none reported, the list may be blank. If incorrect, please contact your healthcare provider. Carry this list with you in case of emergency.           Current Medications     aspirin (ADULT ASPIRIN EC LOW STRENGTH) 81 MG EC tablet Take 1 tablet by mouth Daily.    atorvastatin (LIPITOR) 10 MG tablet Take 10 mg by mouth once daily.    calcium carbonate 500 mg/5 mL (1,250 mg/5 mL) Take 10 mLs (1,000 mg total) by mouth 2 (two) times daily.    colchicine 0.6 mg tablet Take 0.6 mg by mouth once daily.    doxazosin (CARDURA) 4 MG tablet Take 4 mg by mouth every 12 (twelve) hours. Patient takine one tablet twice daily    fexofenadine (ALLEGRA) 60 MG tablet Take 1 tablet by mouth Twice daily as needed.    fluticasone (FLONASE) 50 mcg/actuation nasal spray 1 spray by Each Nare route once daily.    furosemide (LASIX) 40 MG tablet Take 0.5 tablets (20 mg total) by mouth 2 (two) times daily.    metoprolol tartrate (LOPRESSOR) 50 MG tablet Take 1 tablet (50 mg total) by mouth 2 (two) times daily.    multivitamin (THERAGRAN) per tablet Take 1 tablet by mouth Daily.    mycophenolate (CELLCEPT) 250 mg Cap TAKE 2 CAPSULES BY MOUTH EVERY 12 HOURS TO PREVENT KIDNEY TRANSPLANT REJECTION    nifedipine (ADALAT CC) 60 MG TbSR TAKE 1 TABLET BY MOUTH TWICE DAILY.    NTS STEP 1 21 mg/24 hr PLACE 1 PATCH ONTO THE SKIN ONCE DAILY.    pantoprazole (PROTONIX) 40 MG tablet TAKE 1 TABLET BY MOUTH DAILY    paricalcitol (ZEMPLAR) 1 MCG capsule One po [facility]    potassium chloride SA (K-DUR,KLOR-CON) 20 MEQ tablet TAKE 1 TABLET BY MOUTH DAILY    predniSONE (DELTASONE) 5 MG tablet TAKE 1 TABLET BY MOUTH EVERY MORNING    spironolactone (ALDACTONE) 25 MG tablet Take 1 tablet (25 mg total) by mouth once daily.    tacrolimus (PROGRAF) 1 MG Cap Take 4 capsules (4 mg total) by mouth every 12 (twelve) hours.    vitamin D 1000 units Tab Take 370 mg by mouth 2 (two) times daily. 2 tablets BID    warfarin (COUMADIN) 5 MG  "tablet Take 1 tablet (5mg.) by mouth daily, except 1.5 (7.5mg) on Wednesday,  Or as directed by Coumadin Clinic           Clinical Reference Information           Your Vitals Were     BP Pulse Height Weight BMI    132/68 (BP Location: Left arm, Patient Position: Sitting, BP Method: Automatic) 65 6' 1" (1.854 m) 111.2 kg (245 lb 2.4 oz) 32.34 kg/m2      Blood Pressure          Most Recent Value    Right Arm BP - Sitting  129/60    Left Arm BP - Sitting  132/68    BP  132/68      Allergies as of 2/10/2017     Ace Inhibitors    Povidone-iodine    Verapamil      Immunizations Administered on Date of Encounter - 2/10/2017     None      Orders Placed During Today's Visit     Future Labs/Procedures Expected by Expires    Lipid panel  2/10/2017 2/10/2018      Maintenance Dialysis History     Patient has no recorded history of maintenance dialysis.      Transplant Information        Txp Date Organ Coordinator Care Team    4/12/2005 Kidney Shavon CAROLINA Olvera RN Surgeon:  Sd Meyer Jr., MD   Current Nephrologist:  Emre Latif MD         Instructions    Continue colchicine 0.6 mg daily.       Smoking Cessation     If you would like to quit smoking:   You may be eligible for free services if you are a [place] resident and started smoking cigarettes before September 1, 1988.  Call the Smoking Cessation Trust ([facility]) toll free at (878) 267-0299 or ([phone] 554-5274.   Call 1-800-QUIT-NOW if you do not meet the above criteria.            Language Assistance Services     ATTENTION: Language assistance services are available, free of charge. Please call [phone].      ATENCIÓN: Si habla español, tiene a chicas disposición servicios gratuitos de asistencia lingüística. Llame al [phone].     [facility] Ý: N?u b?n nói Ti?ng Vi?t, có các d?ch v? h? tr? ngôn ng? mi?n phí dành cho b?n. G?i s? [phone].         [facility] complies with applicable Federal civil rights laws and does not discriminate on the basis " of race, color, national origin, age, disability, or sex.

## 2017-02-10 NOTE — PROGRESS NOTES
Upon interviewing patient, he reports that he was admitted to [facility] on 1/23 through 1/27 for chest pain and shortness of breath. He was diagnosed with pericarditis and on 1/27 discharged with colchicine and prednisone taper (40mg daily x 4 days, 30mg daily x 4 days, 20mg daily x 4 days, 10mg daily x 4 days then 5mg daily. Currently he has one more dose of the 10mg prednisone to take then he starts his 5mg regimen). INR likely high due to prednisone taper. Patient was reminded to always call us when new medications are started. Since he was therapeutic on this dose prior to hospitalization, we will hold coumadin today then re challenge his previous coumadin dose.

## 2017-02-10 NOTE — PROGRESS NOTES
Subjective:   Patient ID:  Ibrahima Phelps is a 61 y.o. male who presents for follow-up of Acute pericarditis (Hospital d/c 17) and Blood Infection      HPI: He was admitted from  to  with acute onset chest pain and was found to have pericarditis. He was placed on a prednisone taper and colchicine which relieved his pain. He has not had any recurrence since his discharge. He did develop significant abdominal cramping with the colchicine and has only been taking one tablet every other day. An echo done during his admission showed an LVEF of 55% and no pericardial effusion. This is his third episode of pericarditis over the past 10 years. No precipitating event was identified.    EC/3/17: NSR, LHV with repolarization abnormality    ARNIE 4/15: No ischemia.    Past Medical History   Diagnosis Date    Atrial fibrillation     CAD (coronary artery disease)     Diverticulosis     Hyperlipidemia     Hypertension     Immunodeficiency due to treatment with immunosuppressive medication     Metabolic bone disease     Pericarditis     S/P kidney transplant     Thrombocytopenia     Thyroid disease     Unspecified disorder of kidney and ureter      Stage IIICKD       Past Surgical History   Procedure Laterality Date    Cholecystectomy      Colonoscopy  2011     Diverticulosis, repeat recommended in 3 yrs.   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    Alcohol use No    Drug use: No    Sexual activity:  Yes     Partners: Female     Other Topics Concern    None     Social History Narrative       Family History   Problem Relation Age of Onset    No Known Problems Sister     No Known Problems Brother     Thyroid disease Mother      s/p surgery    Heart disease Father      had pacemaker    Heart disease Daughter      enlarged heart    No Known Problems Sister     Kidney failure Sister     Kidney disease Sister     No Known Problems Sister     Kidney disease Brother     Kidney failure Brother      s/p transplant    No Known Problems Brother     Diabetes Mellitus Neg Hx     Stroke Neg Hx     Heart attack Neg Hx     Cancer Neg Hx     Celiac disease Neg Hx     Cirrhosis Neg Hx     Colon cancer Neg Hx     Esophageal cancer Neg Hx     Inflammatory bowel disease Neg Hx     Rectal cancer Neg Hx     Stomach cancer Neg Hx     Ulcerative colitis Neg Hx     Liver disease Neg Hx     Liver cancer Neg Hx     Crohn's disease Neg Hx        Patient's Medications   New Prescriptions    No medications on file   Previous Medications    ASPIRIN (ADULT ASPIRIN EC LOW STRENGTH) 81 MG EC TABLET    Take 1 tablet by mouth Daily.    ATORVASTATIN (LIPITOR) 10 MG TABLET    Take 10 mg by mouth once daily.    CALCIUM CARBONATE 500 MG/5 ML (1,250 MG/5 ML)    Take 10 mLs (1,000 mg total) by mouth 2 (two) times daily.    COLCHICINE 0.6 MG TABLET    Take 0.6 mg by mouth once daily.    DOXAZOSIN (CARDURA) 4 MG TABLET    Take 4 mg by mouth every 12 (twelve) hours. Patient takine one tablet twice daily    FEXOFENADINE (ALLEGRA) 60 MG TABLET    Take 1 tablet by mouth Twice daily as needed.    FLUTICASONE (FLONASE) 50 MCG/ACTUATION NASAL SPRAY    1 spray by Each Nare route once daily.    FUROSEMIDE (LASIX) 40 MG TABLET    Take 0.5 tablets (20 mg total) by mouth 2 (two) times daily.    METOPROLOL TARTRATE (LOPRESSOR) 50 MG TABLET    Take 1 tablet (50 mg total) by mouth 2 (two) times daily.    MULTIVITAMIN (THERAGRAN) PER TABLET     Take 1 tablet by mouth Daily.    MYCOPHENOLATE (CELLCEPT) 250 MG CAP    TAKE 2 CAPSULES BY MOUTH EVERY 12 HOURS TO PREVENT KIDNEY TRANSPLANT REJECTION    NIFEDIPINE (ADALAT CC) 60 MG TBSR    TAKE 1 TABLET BY MOUTH TWICE DAILY.    NTS STEP 1 21 MG/24 HR    PLACE 1 PATCH ONTO THE SKIN ONCE DAILY.    PANTOPRAZOLE (PROTONIX) 40 MG TABLET    TAKE 1 TABLET BY MOUTH DAILY    PARICALCITOL (ZEMPLAR) 1 MCG CAPSULE    One po MWF    POTASSIUM CHLORIDE SA (K-DUR,KLOR-CON) 20 MEQ TABLET    TAKE 1 TABLET BY MOUTH DAILY    PREDNISONE (DELTASONE) 5 MG TABLET    TAKE 1 TABLET BY MOUTH EVERY MORNING    SPIRONOLACTONE (ALDACTONE) 25 MG TABLET    Take 1 tablet (25 mg total) by mouth once daily.    TACROLIMUS (PROGRAF) 1 MG CAP    Take 4 capsules (4 mg total) by mouth every 12 (twelve) hours.    VITAMIN D 1000 UNITS TAB    Take 370 mg by mouth 2 (two) times daily. 2 tablets BID    WARFARIN (COUMADIN) 5 MG TABLET    Take 1 tablet (5mg.) by mouth daily, except 1.5 (7.5mg) on Wednesday,  Or as directed by Coumadin Clinic   Modified Medications    No medications on file   Discontinued Medications    No medications on file       Review of Systems   Constitution: Positive for malaise/fatigue. Negative for weakness and weight gain.   HENT: Negative for headaches and hearing loss.    Eyes: Negative for visual disturbance.   Cardiovascular: Positive for leg swelling. Negative for chest pain, claudication, dyspnea on exertion, near-syncope, orthopnea, palpitations, paroxysmal nocturnal dyspnea and syncope.   Respiratory: Negative for cough, shortness of breath, sleep disturbances due to breathing, snoring and wheezing.    Endocrine: Negative for cold intolerance, heat intolerance, polydipsia, polyphagia and polyuria.   Hematologic/Lymphatic: Negative for bleeding problem. Does not bruise/bleed easily.   Skin: Negative for rash and suspicious lesions.   Musculoskeletal: Negative for arthritis, falls, joint pain, muscle weakness and myalgias.  "  Gastrointestinal: Negative for abdominal pain, change in bowel habit, constipation, diarrhea, heartburn, hematochezia, melena and nausea.   Genitourinary: Negative for hematuria and nocturia.   Neurological: Negative for excessive daytime sleepiness, dizziness, light-headedness and loss of balance.   Psychiatric/Behavioral: Negative for depression. The patient is not nervous/anxious.    Allergic/Immunologic: Negative for environmental allergies.       Visit Vitals    /68 (BP Location: Left arm, Patient Position: Sitting, BP Method: Automatic)    Pulse 65    Ht 6' 1" (1.854 m)    Wt 111.2 kg (245 lb 2.4 oz)    BMI 32.34 kg/m2       Objective:   Physical Exam   Constitutional: He is oriented to person, place, and time. He appears well-developed and well-nourished.        HENT:   Head: Normocephalic and atraumatic.   Mouth/Throat: Oropharynx is clear and moist.   Eyes: Conjunctivae and EOM are normal. Pupils are equal, round, and reactive to light. No scleral icterus.   Neck: Normal range of motion. Neck supple. No hepatojugular reflux and no JVD present. No tracheal deviation present. No thyromegaly present.   Cardiovascular: Normal rate, regular rhythm, normal heart sounds and intact distal pulses.  PMI is not displaced.    Pulses:       Carotid pulses are 2+ on the right side, and 2+ on the left side.       Radial pulses are 2+ on the right side, and 1+ on the left side.        Dorsalis pedis pulses are 2+ on the right side, and 2+ on the left side.        Posterior tibial pulses are 2+ on the right side, and 2+ on the left side.   Pulmonary/Chest: Effort normal and breath sounds normal.   Abdominal: Soft. Bowel sounds are normal. He exhibits no distension and no mass. There is no hepatosplenomegaly. There is no tenderness.   Musculoskeletal: He exhibits edema. He exhibits no tenderness.   1+ leg edema   Lymphadenopathy:     He has no cervical adenopathy.   Neurological: He is alert and oriented to " person, place, and time.   Skin: Skin is warm and dry. No rash noted. No cyanosis or erythema. Nails show no clubbing.   Psychiatric: He has a normal mood and affect. His speech is normal and behavior is normal.       Lab Results   Component Value Date     02/02/2017    K 3.8 02/02/2017     02/02/2017    CO2 28 02/02/2017    BUN 20 02/02/2017    CREATININE 1.4 02/02/2017    GLU 98 02/02/2017    HGBA1C 6.2 10/07/2016    MG 1.9 01/27/2017    AST 18 01/23/2017    ALT 16 01/23/2017    ALBUMIN 3.0 (L) 02/02/2017    PROT 7.5 01/23/2017    BILITOT 0.6 01/23/2017    WBC 11.83 02/02/2017    HGB 12.7 (L) 02/02/2017    HCT 40.0 02/02/2017    MCV 81 (L) 02/02/2017     02/02/2017    INR 3.7 (A) 02/10/2017    INR 1.8 (H) 01/27/2017    TSH 1.119 06/11/2015    CHOL 146 06/11/2015    HDL 36 (L) 06/11/2015    LDLCALC 80.0 06/11/2015    TRIG 150 06/11/2015     (H) 01/23/2017       Assessment:     1. Acute pericarditis, unspecified type : His symptoms have resolved with anti-inflammatory treatment. I asked him to take colchicine 0.6 mg once daily for the next three months.   2. Coronary artery disease involving native coronary artery of native heart without angina pectoris : He is asymptomatic with preserved LVEF. I have ordered a FLP to see if I can increase his atorvastati for high intensity therapy. Continue ASA. DSE 4/15 negative for ischemia.   3. HTN: Blood pressure is at goal. I have made no changes.   4. Mixed hyperlipidemia : Continue atorvastatin.   5. H/O kidney transplant    6. HX: long term anticoagulant use        Plan:     Ibrahima was seen today for acute pericarditis and blood infection.    Diagnoses and all orders for this visit:    Acute pericarditis, unspecified type  -     Lipid panel; Future    Coronary artery disease involving native coronary artery of native heart without angina pectoris  -     Lipid panel; Future    Phlebitis  -     Lipid panel; Future    Mixed hyperlipidemia  -     Lipid  panel; Future    H/O kidney transplant  -     Lipid panel; Future    HX: long term anticoagulant use  -     Lipid panel; Future        Thank you for allowing me to participate in this patient's care. Please do not hesitate to contact me with any questions or concerns.

## 2017-02-13 ENCOUNTER — TELEPHONE (OUTPATIENT)
Dept: INTERNAL MEDICINE | Facility: CLINIC | Age: 62
End: 2017-02-13

## 2017-02-13 ENCOUNTER — TELEPHONE (OUTPATIENT)
Dept: CARDIOLOGY | Facility: CLINIC | Age: 62
End: 2017-02-13

## 2017-02-13 NOTE — TELEPHONE ENCOUNTER
----- Message from Nikunj Waterman sent at 2/13/2017  3:27 PM CST -----  Contact: Patti/Spouse   Like to know status of FMLA forms, has it been completed and when can it be pickup.    Please advise pt.

## 2017-02-14 NOTE — TELEPHONE ENCOUNTER
MD Melinda Rawls MA                   Thanks Mónica,     I asked him to take colchicine 0.6 mg daily for the next three as months to reduce his chance of recurrence.     Shagufta            Previous Messages       ----- Message -----      From: Melinda Hyde MA      Sent: 2/7/2017   4:45 PM        To: Shagufta Blunt MD         ----- Message -----      From: Emre Latif MD      Sent: 2/7/2017  12:54 PM        To: Shagufta Blunt MD, [facility] Staff     [facility],     I saw this pt Friday for hospital follow up for KATINA when he had pericarditis. He self stopped his colchicine from side effects. He feels great. I got an EKG Friday but he's not seeing you till early March. OK for him to stay off colchicine?     Thanks

## 2017-02-15 ENCOUNTER — LAB VISIT (OUTPATIENT)
Dept: LAB | Facility: HOSPITAL | Age: 62
End: 2017-02-15
Attending: INTERNAL MEDICINE
Payer: COMMERCIAL

## 2017-02-15 DIAGNOSIS — Z92.29 HX: LONG TERM ANTICOAGULANT USE: ICD-10-CM

## 2017-02-15 DIAGNOSIS — Z94.0 H/O KIDNEY TRANSPLANT: ICD-10-CM

## 2017-02-15 DIAGNOSIS — E78.2 MIXED HYPERLIPIDEMIA: ICD-10-CM

## 2017-02-15 DIAGNOSIS — I30.9 ACUTE PERICARDITIS, UNSPECIFIED TYPE: ICD-10-CM

## 2017-02-15 DIAGNOSIS — I25.10 CORONARY ARTERY DISEASE INVOLVING NATIVE CORONARY ARTERY OF NATIVE HEART WITHOUT ANGINA PECTORIS: ICD-10-CM

## 2017-02-15 DIAGNOSIS — I80.9 PHLEBITIS: ICD-10-CM

## 2017-02-15 LAB
CHOLEST/HDLC SERPL: 4 {RATIO}
HDL/CHOLESTEROL RATIO: 25 %
HDLC SERPL-MCNC: 148 MG/DL
HDLC SERPL-MCNC: 37 MG/DL
LDLC SERPL CALC-MCNC: 76.4 MG/DL
NONHDLC SERPL-MCNC: 111 MG/DL
TRIGL SERPL-MCNC: 173 MG/DL

## 2017-02-15 PROCEDURE — 36415 COLL VENOUS BLD VENIPUNCTURE: CPT

## 2017-02-15 PROCEDURE — 80061 LIPID PANEL: CPT

## 2017-02-16 ENCOUNTER — TELEPHONE (OUTPATIENT)
Dept: CARDIOLOGY | Facility: CLINIC | Age: 62
End: 2017-02-16

## 2017-02-16 NOTE — TELEPHONE ENCOUNTER
----- Message from Shagufta Blunt MD sent at 2/15/2017  1:38 PM CST -----  LDL is at goal. Triglycerides are slightly elevated.

## 2017-02-17 ENCOUNTER — TELEPHONE (OUTPATIENT)
Dept: INTERNAL MEDICINE | Facility: CLINIC | Age: 62
End: 2017-02-17

## 2017-02-17 NOTE — TELEPHONE ENCOUNTER
----- Message from Stella Lima sent at 2/17/2017  2:56 PM CST -----  Contact: Arturo or Stella -[phone] [facility]  Patient is needing clearance from Dr for an Extraction with fillings and if they should stop the Warfin or continue

## 2017-02-21 NOTE — TELEPHONE ENCOUNTER
Spoke with Dr. Rodarte, informed no need to hold Coumadin for teeth cleaning. Verbalized understanding.

## 2017-02-21 NOTE — TELEPHONE ENCOUNTER
Spoke with Dr. Magdaleno. Informed per Dr. Miranda patient should stop Coumadin 3 days before extraction and resume 12 hours after.  states she will contact patient.

## 2017-02-22 ENCOUNTER — TELEPHONE (OUTPATIENT)
Dept: INTERNAL MEDICINE | Facility: CLINIC | Age: 62
End: 2017-02-22

## 2017-02-22 ENCOUNTER — OFFICE VISIT (OUTPATIENT)
Dept: INTERNAL MEDICINE | Facility: CLINIC | Age: 62
End: 2017-02-22
Payer: COMMERCIAL

## 2017-02-22 ENCOUNTER — ANTI-COAG VISIT (OUTPATIENT)
Dept: CARDIOLOGY | Facility: CLINIC | Age: 62
End: 2017-02-22
Payer: COMMERCIAL

## 2017-02-22 ENCOUNTER — OFFICE VISIT (OUTPATIENT)
Dept: TRANSPLANT | Facility: CLINIC | Age: 62
End: 2017-02-22
Payer: COMMERCIAL

## 2017-02-22 VITALS
OXYGEN SATURATION: 100 % | WEIGHT: 245.38 LBS | BODY MASS INDEX: 32.52 KG/M2 | HEART RATE: 75 BPM | SYSTOLIC BLOOD PRESSURE: 145 MMHG | RESPIRATION RATE: 16 BRPM | HEIGHT: 73 IN | TEMPERATURE: 98 F | DIASTOLIC BLOOD PRESSURE: 78 MMHG

## 2017-02-22 VITALS
DIASTOLIC BLOOD PRESSURE: 91 MMHG | BODY MASS INDEX: 32.34 KG/M2 | WEIGHT: 244.06 LBS | SYSTOLIC BLOOD PRESSURE: 138 MMHG | HEART RATE: 67 BPM | HEIGHT: 73 IN

## 2017-02-22 DIAGNOSIS — N18.30 CKD (CHRONIC KIDNEY DISEASE) STAGE 3, GFR 30-59 ML/MIN: ICD-10-CM

## 2017-02-22 DIAGNOSIS — Z94.0 IMMUNOSUPPRESSIVE MANAGEMENT ENCOUNTER FOLLOWING KIDNEY TRANSPLANT: ICD-10-CM

## 2017-02-22 DIAGNOSIS — I48.91 ATRIAL FIBRILLATION WITH RAPID VENTRICULAR RESPONSE: ICD-10-CM

## 2017-02-22 DIAGNOSIS — Z94.0 H/O KIDNEY TRANSPLANT: ICD-10-CM

## 2017-02-22 DIAGNOSIS — Z79.01 LONG TERM (CURRENT) USE OF ANTICOAGULANTS: ICD-10-CM

## 2017-02-22 DIAGNOSIS — E07.9 THYROID DISEASE: ICD-10-CM

## 2017-02-22 DIAGNOSIS — Z92.29 HX: LONG TERM ANTICOAGULANT USE: ICD-10-CM

## 2017-02-22 DIAGNOSIS — I10 ESSENTIAL HYPERTENSION: ICD-10-CM

## 2017-02-22 DIAGNOSIS — Z79.899 IMMUNOSUPPRESSIVE MANAGEMENT ENCOUNTER FOLLOWING KIDNEY TRANSPLANT: ICD-10-CM

## 2017-02-22 DIAGNOSIS — Z79.899 IMMUNODEFICIENCY DUE TO TREATMENT WITH IMMUNOSUPPRESSIVE MEDICATION: ICD-10-CM

## 2017-02-22 DIAGNOSIS — D84.821 IMMUNODEFICIENCY DUE TO TREATMENT WITH IMMUNOSUPPRESSIVE MEDICATION: ICD-10-CM

## 2017-02-22 DIAGNOSIS — Z94.0 H/O KIDNEY TRANSPLANT: Primary | ICD-10-CM

## 2017-02-22 DIAGNOSIS — I10 ESSENTIAL HYPERTENSION: Primary | ICD-10-CM

## 2017-02-22 DIAGNOSIS — Z79.01 LONG TERM (CURRENT) USE OF ANTICOAGULANTS: Primary | ICD-10-CM

## 2017-02-22 DIAGNOSIS — E78.5 HYPERLIPIDEMIA, UNSPECIFIED HYPERLIPIDEMIA TYPE: ICD-10-CM

## 2017-02-22 DIAGNOSIS — J43.8 OTHER EMPHYSEMA: ICD-10-CM

## 2017-02-22 PROBLEM — J44.9 COPD (CHRONIC OBSTRUCTIVE PULMONARY DISEASE): Status: ACTIVE | Noted: 2017-02-22

## 2017-02-22 LAB — INR PPP: 3.9 (ref 2–3)

## 2017-02-22 PROCEDURE — 90670 PCV13 VACCINE IM: CPT | Mod: S$GLB,,, | Performed by: INTERNAL MEDICINE

## 2017-02-22 PROCEDURE — 3080F DIAST BP >= 90 MM HG: CPT | Mod: S$GLB,,, | Performed by: INTERNAL MEDICINE

## 2017-02-22 PROCEDURE — 99211 OFF/OP EST MAY X REQ PHY/QHP: CPT | Mod: 25,S$GLB,, | Performed by: PHARMACIST

## 2017-02-22 PROCEDURE — 3074F SYST BP LT 130 MM HG: CPT | Mod: S$GLB,,, | Performed by: INTERNAL MEDICINE

## 2017-02-22 PROCEDURE — 99999 PR PBB SHADOW E&M-EST. PATIENT-LVL III: CPT | Mod: PBBFAC,,, | Performed by: INTERNAL MEDICINE

## 2017-02-22 PROCEDURE — 3078F DIAST BP <80 MM HG: CPT | Mod: S$GLB,,, | Performed by: INTERNAL MEDICINE

## 2017-02-22 PROCEDURE — 1160F RVW MEDS BY RX/DR IN RCRD: CPT | Mod: S$GLB,,, | Performed by: INTERNAL MEDICINE

## 2017-02-22 PROCEDURE — 85610 PROTHROMBIN TIME: CPT | Mod: QW,S$GLB,, | Performed by: PHARMACIST

## 2017-02-22 PROCEDURE — 99215 OFFICE O/P EST HI 40 MIN: CPT | Mod: S$GLB,,, | Performed by: INTERNAL MEDICINE

## 2017-02-22 PROCEDURE — 99214 OFFICE O/P EST MOD 30 MIN: CPT | Mod: 25,S$GLB,, | Performed by: INTERNAL MEDICINE

## 2017-02-22 PROCEDURE — 90471 IMMUNIZATION ADMIN: CPT | Mod: S$GLB,,, | Performed by: INTERNAL MEDICINE

## 2017-02-22 PROCEDURE — 3075F SYST BP GE 130 - 139MM HG: CPT | Mod: S$GLB,,, | Performed by: INTERNAL MEDICINE

## 2017-02-22 NOTE — MR AVS SNAPSHOT
[facility]  [address]  [facility] [address]  Phone: [phone]                  Ibrahima Phelps   2017 3:00 PM   Office Visit    Description:  Male : 1955   Provider:  Layne Chairez MD   Department:  [facility]           Reason for Visit     Kidney Transplant Reassessment           Diagnoses this Visit        Comments    H/O kidney transplant    -  Primary     Essential hypertension         Atrial fibrillation with rapid ventricular response         Long term (current) use of anticoagulants         Immunodeficiency due to treatment with immunosuppressive medication         Hyperlipidemia, unspecified hyperlipidemia type         CKD (chronic kidney disease) stage 3, GFR 30-59 ml/min         Immunosuppressive management encounter following kidney transplant                To Do List           Future Appointments        Provider Department Dept Phone    3/1/2017 12:30 PM [place] Tee, PharmD [facility] Cou[facility] [phone]    3/1/2017 1:00 PM PULMONARY FUNCTION [facility]monary Lab [phone]    2017 7:00 AM SPECIMEN, MAIN CAMPUS [facility] [phone]    2017 7:05 AM SPE[facility]IMEN[facility] [phone]    2017 8:20 AM Emre Latif MD [facility] Nephrology [phone]      Goals (5 Years of Data)     None      [facility] On [facility]     [facility] Care [facility] - 24/ Assistance  Registered nurses in the [facility] provide clinical advisement, health education, appointment booking, and other advisory services.  Call for this free service at [phone].             Medications           Message regarding Medications     Verify the changes and/or additions to your medication regime listed below are the same as discussed with your clinician today.  If any of these changes or additions are incorrect, please notify your healthcare provider.             Verify that the below  list of medications is an accurate representation of the medications you are currently taking.  If none reported, the list may be blank. If incorrect, please contact your healthcare provider. Carry this list with you in case of emergency.           Current Medications     aspirin (ADULT ASPIRIN EC LOW STRENGTH) 81 MG EC tablet Take 1 tablet by mouth Daily.    atorvastatin (LIPITOR) 10 MG tablet Take 10 mg by mouth once daily.    calcium carbonate 500 mg/5 mL (1,250 mg/5 mL) Take 10 mLs (1,000 mg total) by mouth 2 (two) times daily.    colchicine 0.6 mg tablet Take 0.6 mg by mouth once daily.    doxazosin (CARDURA) 4 MG tablet Take 4 mg by mouth every 12 (twelve) hours. Patient takine one tablet twice daily    fexofenadine (ALLEGRA) 60 MG tablet Take 1 tablet by mouth Twice daily as needed.    fluticasone (FLONASE) 50 mcg/actuation nasal spray 1 spray by Each Nare route once daily.    furosemide (LASIX) 40 MG tablet Take 0.5 tablets (20 mg total) by mouth 2 (two) times daily.    metoprolol tartrate (LOPRESSOR) 50 MG tablet Take 1 tablet (50 mg total) by mouth 2 (two) times daily.    multivitamin (THERAGRAN) per tablet Take 1 tablet by mouth Daily.    mycophenolate (CELLCEPT) 250 mg Cap TAKE 2 CAPSULES BY MOUTH EVERY 12 HOURS TO PREVENT KIDNEY TRANSPLANT REJECTION    nifedipine (ADALAT CC) 60 MG TbSR TAKE 1 TABLET BY MOUTH TWICE DAILY.    NTS STEP 1 21 mg/24 hr PLACE 1 PATCH ONTO THE SKIN ONCE DAILY.    pantoprazole (PROTONIX) 40 MG tablet TAKE 1 TABLET BY MOUTH DAILY    paricalcitol (ZEMPLAR) 1 MCG capsule One po MWF    predniSONE (DELTASONE) 5 MG tablet TAKE 1 TABLET BY MOUTH EVERY MORNING    spironolactone (ALDACTONE) 25 MG tablet Take 1 tablet (25 mg total) by mouth once daily.    tacrolimus (PROGRAF) 1 MG Cap Take 4 capsules (4 mg total) by mouth every 12 (twelve) hours.    vitamin D 1000 units Tab Take 370 mg by mouth 2 (two) times daily. 2 tablets BID    warfarin (COUMADIN) 5 MG tablet Take 1 tablet (5mg.) by  "mouth daily, except 1.5 (7.5mg) on Wednesday,  Or as directed by Coumadin Clinic    potassium chloride SA (K-DUR,KLOR-CON) 20 MEQ tablet TAKE 1 TABLET BY MOUTH DAILY           Clinical Reference Information           Your Vitals Were     BP Pulse Temp Resp Height Weight    145/78 (BP Location: Right arm, Patient Position: Sitting, BP Method: Automatic) 75 98.2 °F (36.8 °C) (Oral) 16 6' 1" (1.854 m) 111.3 kg (245 lb 6 oz)    SpO2 BMI             100% 32.37 kg/m2         Blood Pressure          Most Recent Value    BP  (!)  145/78      Allergies as of 2/22/2017     Ace Inhibitors    Povidone-iodine    Verapamil      Immunizations Administered on Date of Encounter - 2/22/2017     Name Date Dose VIS Date Route    Pneumococcal Conjugate - 13 Valent 2/22/2017 0.5 mL 11/5/2015 Intramuscular      Maintenance Dialysis History     Patient has no recorded history of maintenance dialysis.      Transplant Information        Txp Date Organ Coordinator Care Team    4/12/2005 Kidney Shavon CAROLINA Olvera RN Surgeon:  Sd Meyer Jr., MD   Current Nephrologist:  Emre Latif MD         Instructions    1. Try to stay active   2. We will have dietician call you       Smoking Cessation     If you would like to quit smoking:   You may be eligible for free services if you are a [place] resident and started smoking cigarettes before September 1, 1988.  Call the Smoking Cessation Trust ([facility]) toll free at (354) 243-6850 or (526) 518-1231.   Call 1-800-QUIT-NOW if you do not meet the above criteria.            Language Assistance Services     ATTENTION: Language assistance services are available, free of charge. Please call [phone].      ATENCIÓN: Si habla español, tiene a chicas disposición servicios gratuitos de asistencia lingüística. Llame al [phone].     MARLEY Ý: N?u b?n nói Ti?ng Vi?t, có các d?ch v? h? tr? ngôn ng? mi?n phí dành cho b?n. G?i s? [phone].         [facility] complies with applicable " Federal civil rights laws and does not discriminate on the basis of race, color, national origin, age, disability, or sex.

## 2017-02-22 NOTE — MR AVS SNAPSHOT
[facility] - Internal Medicine  [address]  [facility] [address]  Phone: [phone]  Fax: [phone]                  Ibrahima Phelps   2017 9:40 AM   Office Visit    Description:  Male : 1955   Provider:  Scott Miranda Jr., MD   Department:  [facility] - [facility] Medicine           Reason for Visit     Follow-up           Diagnoses this Visit        Comments    Essential hypertension    -  Primary     Other emphysema         Thyroid disease                To Do List           Future Appointments        Provider Department Dept Phone    2017 10:45 AM Jeffery Corbin, PharmD [facility] Coumadin [phone]    2017 11:10 AM LAB, APPOINTMENT [place] [facility] [phone]    2017 3:00 PM MD [facility] Transplant [phone]    3/1/2017 1:00 PM PULMONARY FUNCTION [facility] Pulmonary Lab [phone]    2017 7:00 AM SPECIMEN, MAIN CAMPUS [facility] [phone]      Goals (5 Years of Data)     None      Follow-Up and Disposition     Return in about 1 year (around 2018).      [facility]     [facility] Care [facility] - / Assistance  Registered nurses in the [facility] provide clinical advisement, health education, appointment booking, and other advisory services.  Call for this free service at [phone].             Medications           Message regarding Medications     Verify the changes and/or additions to your medication regime listed below are the same as discussed with your clinician today.  If any of these changes or additions are incorrect, please notify your healthcare provider.             Verify that the below list of medications is an accurate representation of the medications you are currently taking.  If none reported, the list may be blank. If incorrect, please contact your healthcare provider. Carry this list with you in case of emergency.            Current Medications     aspirin (ADULT ASPIRIN EC LOW STRENGTH) 81 MG EC tablet Take 1 tablet by mouth Daily.    atorvastatin (LIPITOR) 10 MG tablet Take 10 mg by mouth once daily.    calcium carbonate 500 mg/5 mL (1,250 mg/5 mL) Take 10 mLs (1,000 mg total) by mouth 2 (two) times daily.    colchicine 0.6 mg tablet Take 0.6 mg by mouth once daily.    doxazosin (CARDURA) 4 MG tablet Take 4 mg by mouth every 12 (twelve) hours. Patient takine one tablet twice daily    fexofenadine (ALLEGRA) 60 MG tablet Take 1 tablet by mouth Twice daily as needed.    fluticasone (FLONASE) 50 mcg/actuation nasal spray 1 spray by Each Nare route once daily.    furosemide (LASIX) 40 MG tablet Take 0.5 tablets (20 mg total) by mouth 2 (two) times daily.    metoprolol tartrate (LOPRESSOR) 50 MG tablet Take 1 tablet (50 mg total) by mouth 2 (two) times daily.    multivitamin (THERAGRAN) per tablet Take 1 tablet by mouth Daily.    mycophenolate (CELLCEPT) 250 mg Cap TAKE 2 CAPSULES BY MOUTH EVERY 12 HOURS TO PREVENT KIDNEY TRANSPLANT REJECTION    nifedipine (ADALAT CC) 60 MG TbSR TAKE 1 TABLET BY MOUTH TWICE DAILY.    NTS STEP 1 21 mg/24 hr PLACE 1 PATCH ONTO THE SKIN ONCE DAILY.    pantoprazole (PROTONIX) 40 MG tablet TAKE 1 TABLET BY MOUTH DAILY    paricalcitol (ZEMPLAR) 1 MCG capsule One po MWF    predniSONE (DELTASONE) 5 MG tablet TAKE 1 TABLET BY MOUTH EVERY MORNING    spironolactone (ALDACTONE) 25 MG tablet Take 1 tablet (25 mg total) by mouth once daily.    tacrolimus (PROGRAF) 1 MG Cap Take 4 capsules (4 mg total) by mouth every 12 (twelve) hours.    vitamin D 1000 units Tab Take 370 mg by mouth 2 (two) times daily. 2 tablets BID    warfarin (COUMADIN) 5 MG tablet Take 1 tablet (5mg.) by mouth daily, except 1.5 (7.5mg) on Wednesday,  Or as directed by Coumadin Clinic    potassium chloride SA (K-DUR,KLOR-CON) 20 MEQ tablet TAKE 1 TABLET BY MOUTH DAILY           Clinical Reference Information           Your Vitals Were     BP  "Pulse Height Weight BMI    138/91 (BP Location: Right arm, Patient Position: Sitting, BP Method: Automatic) 67 6' 1" (1.854 m) 110.7 kg (244 lb 0.8 oz) 32.2 kg/m2      Blood Pressure          Most Recent Value    BP  (!)  138/91      Allergies as of 2/22/2017     Ace Inhibitors    Povidone-iodine    Verapamil      Immunizations Administered on Date of Encounter - 2/22/2017     Name Date Dose VIS Date Route    Pneumococcal Conjugate - 13 Valent 2/22/2017 0.5 mL 11/5/2015 Intramuscular      Orders Placed During Today's Visit      Normal Orders This Visit    Pneumococcal Conjugate Vaccine (13 Valent) (IM)     Future Labs/Procedures Expected by Expires    T4, free  2/22/2017 5/23/2017    TSH  2/22/2017 2/22/2018    Complete PFT with bronchodilator  As directed 5/23/2017      Maintenance Dialysis History     Patient has no recorded history of maintenance dialysis.      Transplant Information        Txp Date Organ Coordinator Care Team    4/12/2005 Kidney Shavon CAROLINA Olvera RN Surgeon:  Sd Meyer Jr., MD   Current Nephrologist:  Emre Latif MD         Smoking Cessation     If you would like to quit smoking:   You may be eligible for free services if you are a [place] resident and started smoking cigarettes before September 1, 1988.  Call the Smoking Cessation Trust ([facility]) toll free at (276) 725-0068 or (283) 498-7767.   Call 1-800-QUIT-NOW if you do not meet the above criteria.            Language Assistance Services     ATTENTION: Language assistance services are available, free of charge. Please call [phone].      ATENCIÓN: Si habla español, tiene a chicas disposición servicios gratuitos de asistencia lingüística. Llame al [phone].     CHÚ Ý: N?u b?n nói Ti?ng Vi?t, có các d?ch v? h? tr? ngôn ng? mi?n phí dành cho b?n. G?i s? [phone].         [facility] complies with applicable Federal civil rights laws and does not discriminate on the basis of race, color, national " origin, age, disability, or sex.

## 2017-02-22 NOTE — PROGRESS NOTES
Patient denies any changes in diet, medications, or health that would effect warfarin therapy.  No changes to explain why INR continues to hover high. I will have patient hold coumadin today then lower his weekly dose

## 2017-02-22 NOTE — TELEPHONE ENCOUNTER
----- Message from Yuni Powell sent at 2/22/2017 10:57 AM CST -----  Contact: Wife/Patti/[phone]  Pt's wife said that she is calling in regards to pt was seen in the office today and she needs the doctor to give him a note to return to work and a note stating that he needs to take antibiotics before any dental work he needs to have done she stated that they are at [facility] now doing lab work and wants to pick those notes up in a couple of minutes. Please call and advise            Thank you

## 2017-02-22 NOTE — PROGRESS NOTES
Subjective:       Patient ID: Ibrahima Phelps is a 61 y.o. male.    Chief Complaint: Follow-up    HPI the patient is a 61-year-old male comes in for follow-up and a general checkup.  He does request a note to return to work after recent episode of pericarditis.  He was out of work from January 22 and will return on March 6.  The patient reports that he continues to notice mild shortness of breath which was his presenting complaint for the pericarditis.  He denies much in the way of any chest pain.  He continues to smoke about a half a pack of cigarettes per day he denies any wheezing.  He does have occasional slight cough.  Review of Systems   Constitutional: Negative.  Negative for activity change and unexpected weight change.   Respiratory: Positive for shortness of breath. Negative for chest tightness and wheezing.    Cardiovascular: Negative for chest pain and palpitations.       Objective:      Physical Exam   Constitutional: He appears well-developed and well-nourished. No distress.   Neck: No tracheal deviation present. No thyromegaly present.   Cardiovascular: Normal rate, regular rhythm and normal heart sounds.  Exam reveals no gallop and no friction rub.    No murmur heard.  Pulmonary/Chest: Effort normal and breath sounds normal. No respiratory distress. He has no wheezes. He has no rales.   Skin: He is not diaphoretic.   Vitals reviewed.      Assessment:       1. Essential hypertension    2. Other emphysema    3. Thyroid disease    4. CKD (chronic kidney disease) stage 3, GFR 30-59 ml/min    5. H/O kidney transplant    6. HX: long term anticoagulant use    7. Long term (current) use of anticoagulants [V58.61]        Plan:       1.  Follow-up thyroid function studies given previous history of subclinical hyperthyroidism  2.  Pulmonary function studies to better characterize the severity of his COPD as a possible etiology of his shortness of breath  3.  Discontinue cigarettes  4.  Prevnar vaccine  5.  He will  be following up with his cardiologist and nephrologist regularly over the upcoming months  6.  Return to clinic to see me in 1 year

## 2017-02-22 NOTE — PROGRESS NOTES
Kidney Post-Transplant Assessment    Referring Physician:   Current Nephrologist: Emre Latif    ORGAN: LEFT KIDNEY  Donor Type: ?  PHS Increased Risk: ?  Cold Ischemia: 504 mins  Induction Medications: thymoglobulin    Subjective:     CC:  Reassessment of renal allograft function and management of chronic immunosuppression.    Kidney History:  Mr. Phelps is a 61 y.o. year old Black or  male with history of ESRD secondary to unclear etiology who was on dialysis started in ~Jan 1992 until receiving DD[place] in July [place] at [facility] which failed secondary to rejection and he resumed dialysis in April 1996 until receiving DDRT #2 (THYMO x3 induction, CMV D-/R+) on 4/12/05. He had transplant nephrectomy of DDRT #1 (possibly in 1997 vs 2005 around time of second transplant). He has CKD stage 2/3 - GFR 30-89 and his baseline creatinine is between 1.2 to 1.8. He takes mycophenolate mofetil, prednisone and tacrolimus for maintenance immunosuppression.     He has history of A-fib s/p DCCV in 4/2009 on warfarin, pericarditis x3 episodes (last episode in Jan 2017), gout, CAD s/p stent placement to LAD in 2006.    Interval History: Patient hasn't been seen in renal transplant clinic for >3 years but ws recently hospitalized from 1/23/17 to 1/27/17 with SOB and pericarditis. He is not very active; will occasionally go grocery shopping and laundry. Still is working as a . Checks BPs at home occasionally and they will be 130-140s/70-80s.     Review of Systems  Constitutional: +fatigue; Negative for fever, appetite change  HENT: +needs dental work; Negative for hearing loss, sore throat and mouth sores.   Eyes: Negative for photophobia, pain and visual disturbance.   Respiratory: +occasional DAUGHERTY - was able to walk from garage to clinic without symptoms but states at times will get DAUGHERTY with walking; improving since last hospitalization; Negative for cough and wheezing.   Cardiovascular: +occasional  palpitations associated with A-fib; Negative for chest pain and leg swelling.   Gastrointestinal: +intermittent abdominal pain and diarrhea which he reports occurs when he takes colchicine; Negative for nausea, vomiting, constipation, blood in stool and abdominal distention.   Genitourinary: Negative for dysuria, urgency, frequency, hematuria, decreased urine volume, difficulty urinating.   Musculoskeletal: +states he has very seldom gout flares; +arthritis  Skin: Negative for pallor, rash and wound.   Neurological: +right handed tremor for the last 30 years but much worse over the last 3-4 months; he is right handed and at times will drop things - states while placing a cup of coffee on the table he got to shaking so bad that he dropped coffee everywhere - offered referral to neurology for further evaluation since this does not seem to be consistent with Prograf toxicity since it is unilateral and pre-dated his first transplant even; +occasional sinus headaches - relieved with Tylenol;  Negative for dizziness, syncope, weakness, light-headedness  Hematological: +bruises easily - on warfarin for about the last year; Negative for adenopathy.  Psychiatric/Behavioral: Negative for confusion, sleep disturbance and dysphoric mood. The patient is not nervous/anxious.     Medications:  Current Outpatient Prescriptions   Medication Sig Dispense Refill    aspirin (ADULT ASPIRIN EC LOW STRENGTH) 81 MG EC tablet Take 1 tablet by mouth Daily.      atorvastatin (LIPITOR) 10 MG tablet Take 10 mg by mouth once daily.      calcium carbonate 500 mg/5 mL (1,250 mg/5 mL) Take 10 mLs (1,000 mg total) by mouth 2 (two) times daily. 473 mL 11    colchicine 0.6 mg tablet Take 0.6 mg by mouth once daily.      doxazosin (CARDURA) 4 MG tablet Take 4 mg by mouth every 12 (twelve) hours. Patient takine one tablet twice daily      fexofenadine (ALLEGRA) 60 MG tablet Take 1 tablet by mouth Twice daily as needed.      fluticasone (FLONASE)  "50 mcg/actuation nasal spray 1 spray by Each Nare route once daily. 16 g 6    furosemide (LASIX) 40 MG tablet Take 0.5 tablets (20 mg total) by mouth 2 (two) times daily. 180 tablet 2    metoprolol tartrate (LOPRESSOR) 50 MG tablet Take 1 tablet (50 mg total) by mouth 2 (two) times daily. 180 tablet 3    multivitamin (THERAGRAN) per tablet Take 1 tablet by mouth Daily.      mycophenolate (CELLCEPT) 250 mg Cap TAKE 2 CAPSULES BY MOUTH EVERY 12 HOURS TO PREVENT KIDNEY TRANSPLANT REJECTION 360 capsule 3    nifedipine (ADALAT CC) 60 MG TbSR TAKE 1 TABLET BY MOUTH TWICE DAILY. 180 tablet 3    NTS STEP 1 21 mg/24 hr PLACE 1 PATCH ONTO THE SKIN ONCE DAILY. 28 patch 6    pantoprazole (PROTONIX) 40 MG tablet TAKE 1 TABLET BY MOUTH DAILY 90 tablet 3    paricalcitol (ZEMPLAR) 1 MCG capsule One po MWF 90 capsule 3    potassium chloride SA (K-DUR,KLOR-CON) 20 MEQ tablet TAKE 1 TABLET BY MOUTH DAILY 90 tablet 3    predniSONE (DELTASONE) 5 MG tablet TAKE 1 TABLET BY MOUTH EVERY MORNING 90 tablet 3    spironolactone (ALDACTONE) 25 MG tablet Take 1 tablet (25 mg total) by mouth once daily. 90 tablet 3    tacrolimus (PROGRAF) 1 MG Cap Take 4 capsules (4 mg total) by mouth every 12 (twelve) hours. 240 capsule 2    vitamin D 1000 units Tab Take 370 mg by mouth 2 (two) times daily. 2 tablets BID      warfarin (COUMADIN) 5 MG tablet Take 1 tablet (5mg.) by mouth daily, except 1.5 (7.5mg) on Wednesday,  Or as directed by Coumadin Clinic 40 tablet 5     No current facility-administered medications for this visit.          Objective:   Blood pressure (!) 145/78, pulse 75, temperature 98.2 °F (36.8 °C), temperature source Oral, resp. rate 16, height 6' 1" (1.854 m), weight 111.3 kg (245 lb 6 oz), SpO2 100 %.body mass index is 32.37 kg/(m^2).    Physical Exam  General: No acute distress, well groomed, alert and oriented x 3  HEENT: Normocephalic, atraumatic, EOM's intact bilaterally, external inspection of ears and nose normal, " moist mucous membranes, no oral ulcerations/lesions  Neck: Supple, symmetrical, trachea midline, no thyromegaly, no JVD  Respiratory: Clear to auscultation bilaterally, respirations unlabored, no rales/rhonchi/wheezing  Cardiovacular: irregularly irregular, S1, S2 normal, no murmurs, rubs or gallops  Gastrointestinal: Soft, non-tender, bowel sounds normal, no hepatosplenomegaly  LLQ renal allograft exam: No tenderness, no bruits, normal exam  Musculoskeletal: No knee or ankle joint tenderness or swelling.   Extremities: No clubbing or cyanosis of bilateral upper extremities; no lower extremity edema bilaterally, radial pulses 2+ bilaterally, symmetric  Skin: warm and dry; no rash on exposed skin  Neurologic: CN grossly intact      Labs:  Lab Results   Component Value Date    WBC 11.83 02/02/2017    HGB 12.7 (L) 02/02/2017    HCT 40.0 02/02/2017     02/02/2017    K 3.8 02/02/2017     02/02/2017    CO2 28 02/02/2017    BUN 20 02/02/2017    CREATININE 1.4 02/02/2017    EGFRNONAA 53.5 (A) 02/02/2017    GLUCOSE 121 (H) 04/12/2005    CALCIUM 7.6 (L) 02/02/2017    PHOS 3.8 02/02/2017    MG 1.9 01/27/2017    ALBUMIN 3.0 (L) 02/02/2017    AST 18 01/23/2017    ALT 16 01/23/2017       No results found for: EXTANC, EXTWBC, EXTSEGS, EXTPLATELETS, EXTHEMOGLOBI, EXTHEMATOCRI, EXTCREATININ, EXTSODIUM, EXTPOTASSIUM, EXTBUN, EXTCO2, EXTCALCIUM, EXTPHOSPHORU, EXTGLUCOSE, EXTALBUMIN, EXTAST, EXTALT, EXTBILITOTAL, EXTLIPASE, EXTAMYLASE    No results found for: EXTCYCLOSLVL, EXTSIROLIMUS, EXTTACROLVL, EXTPROTCRE, EXTPTHINTACT, EXTPROTEINUA, EXTWBCUA, EXTRBCUA    Labs were reviewed with the patient.    Assessment/Plan:     1. H/O kidney transplant    2. Essential hypertension    3. Atrial fibrillation with rapid ventricular response    4. Long term (current) use of anticoagulants [V58.61]    5. Immunodeficiency due to treatment with immunosuppressive medication    6. Hyperlipidemia, unspecified hyperlipidemia type    7. CKD  (chronic kidney disease) stage 3, GFR 30-59 ml/min    8. Immunosuppressive management encounter following kidney transplant        Mr. Phelps is a 61 y.o. male with:     # History of ESRD secondary to unclear etiology who was on dialysis started in ~Jan 1992 until receiving DDRT in July 1992 at [facility] [place] which failed secondary to rejection and he resumed dialysis in April 1996 until receiving DDRT #2 (THYMO x3 induction, CMV D-/R+) on 4/12/05: baseline Cr 1.2  to 1.8  - last Cr within baseline at 1.4 from 2/2/17  - last UPC 0.71 gm from 2/2/17    # Immunosuppression:   - continue Prograf 4 mg BID, last FK-506 level 4.0 from 2/2/17  - continue  mg BID   - continue prednisone 5 mg daily   - will monitor closely for toxicities    # Infectious Surveillance:   - last CMV negative from 1/24/17  - last BK urine PCR negative from 12/6/12    # HTN: BPs well controlled at home  - continue current anti-hypertensive regimen  - continue with home blood pressure monitoring  - low salt and healthy life discussed with the patient    # Metabolic Bone Disease/Secondary Hyperparathyroidism: last calcium low but phos normal   - continue calcium carbonate, zemplar, and vitamin D - adjustment per general nephrologist   - check ionized calcium   - will monitor PTH, calcium, and phosphorus as per our center protocol    # Anemia of chronic disease: Hb stable at 12.7  - will continue monitoring as per our center guidelines. No indication for acute intervention today    # History of CAD s/p PCI to LAD in 2006  - continue ASA, beta blocker, statin     # History of Pericarditis: patient with 3 episodes of pericarditis; last one was in Jan 2017  - continue colchicine     # A-fib:   - continue metoprolol   - continue K-DUR and calcium supplements to optimized electrolytes   - continue warfarin     # Hypoalbuminemia: albumin low at 3.0   - encouraged patient to eat more protein   - would have dietician call the patient to  him on diet  (higher protein, higher potassium, higher calcium)    Follow-up:   Annual follow-up with kidney transplant clinic with repeat labs, including renal function panel with UA, urine protein/creatinine ratio, and drug trough level q3 months.  Patient also reminded to follow-up with general nephrologist.    Layne Chairez MD       Education:   Material provided to the patient.  Patient reminded to call with any health changes since these can be early signs of significant complications.  Also, I advised the patient to be sure any new medications or changes of old medications are discussed with either a pharmacist or physician knowledgeable with transplant to avoid rejection/drug toxicity related to significant drug interactions.    UNOS Patient Status  Functional Status: 100% - Normal, no complaints, no evidence of disease  Physical Capacity: No Limitations

## 2017-02-22 NOTE — LETTER
February 22, 2017        Emre Latif  [address]  [address]  [facility] [address]  Phone: [phone]  Fax: [phone]             [facility]  [address]  [facility] [address]  Phone: [phone]   Patient: Ibrahima Phelps   MR Number: [id number]   YOB: 1955   Date of Visit: 2/22/2017       Dear Dr. Emre Latif    Thank you for referring Ibrahima Phelps to me for evaluation. Attached you will find relevant portions of my assessment and plan of care.    If you have questions, please do not hesitate to call me. I look forward to following Ibrahima Phelps along with you.    Sincerely,    Layne Chairez MD    Enclosure    If you would like to receive this communication electronically, please contact [email or web address] or [phone] to request Video Passports Link access.    Video Passports Link is a tool which provides read-only access to select patient information with whom you have a relationship. Its easy to use and provides real time access to review your patients record including encounter summaries, notes, results, and demographic information.    If you feel you have received this communication in error or would no longer like to receive these types of communications, please e-mail [email or web address]

## 2017-02-23 ENCOUNTER — TELEPHONE (OUTPATIENT)
Dept: TRANSPLANT | Facility: CLINIC | Age: 62
End: 2017-02-23

## 2017-02-23 DIAGNOSIS — Z94.0 KIDNEY REPLACED BY TRANSPLANT: Primary | ICD-10-CM

## 2017-02-23 DIAGNOSIS — E83.51 HYPOCALCEMIA: ICD-10-CM

## 2017-02-23 NOTE — TELEPHONE ENCOUNTER
Contacted pt and discussed sources of protein and calcium in diet and encouraged pt to increase intake of same.  Pt currently does not consume much dairy but willing to try to consume more cheese.  States he has been eating well.  Provided contact info and encouraged pt to contact RD with any nutrition questions/concerns.    Mandie Lima, MS RD LDN      ----- Message from Shavon Olvera RN sent at 2/23/2017 11:38 AM CST -----  Regarding: call pt- dietary counseling  Please call pt per Dr Layne Chairez's 2/22/17 clinic instructions:  # Hypoalbuminemia: albumin low at 3.0   - encouraged patient to eat more protein   - would have dietician call the patient to  him on diet (higher protein, higher potassium, higher calcium)    Thank you,   Shavon

## 2017-02-27 ENCOUNTER — PATIENT MESSAGE (OUTPATIENT)
Dept: INTERNAL MEDICINE | Facility: CLINIC | Age: 62
End: 2017-02-27

## 2017-03-01 ENCOUNTER — HOSPITAL ENCOUNTER (OUTPATIENT)
Dept: PULMONOLOGY | Facility: CLINIC | Age: 62
Discharge: HOME | End: 2017-03-01
Payer: COMMERCIAL

## 2017-03-01 ENCOUNTER — ANTI-COAG VISIT (OUTPATIENT)
Dept: CARDIOLOGY | Facility: CLINIC | Age: 62
End: 2017-03-01
Payer: COMMERCIAL

## 2017-03-01 ENCOUNTER — LAB VISIT (OUTPATIENT)
Dept: LAB | Facility: HOSPITAL | Age: 62
End: 2017-03-01
Attending: INTERNAL MEDICINE
Payer: COMMERCIAL

## 2017-03-01 DIAGNOSIS — Z94.0 KIDNEY REPLACED BY TRANSPLANT: ICD-10-CM

## 2017-03-01 DIAGNOSIS — I10 ESSENTIAL HYPERTENSION: ICD-10-CM

## 2017-03-01 DIAGNOSIS — Z79.01 LONG TERM (CURRENT) USE OF ANTICOAGULANTS: ICD-10-CM

## 2017-03-01 DIAGNOSIS — E83.51 HYPOCALCEMIA: ICD-10-CM

## 2017-03-01 DIAGNOSIS — E07.9 THYROID DISEASE: ICD-10-CM

## 2017-03-01 DIAGNOSIS — J43.8 OTHER EMPHYSEMA: ICD-10-CM

## 2017-03-01 DIAGNOSIS — I48.91 ATRIAL FIBRILLATION WITH RAPID VENTRICULAR RESPONSE: ICD-10-CM

## 2017-03-01 LAB
ALBUMIN SERPL BCP-MCNC: 3.3 G/DL
ANION GAP SERPL CALC-SCNC: 7 MMOL/L
BUN SERPL-MCNC: 18 MG/DL
CA-I BLDV-SCNC: 0.93 MMOL/L
CALCIUM SERPL-MCNC: 7.6 MG/DL
CHLORIDE SERPL-SCNC: 105 MMOL/L
CO2 SERPL-SCNC: 27 MMOL/L
CREAT SERPL-MCNC: 2 MG/DL
EST. GFR  (AFRICAN AMERICAN): 40.2 ML/MIN/1.73 M^2
EST. GFR  (NON AFRICAN AMERICAN): 34.7 ML/MIN/1.73 M^2
GLUCOSE SERPL-MCNC: 104 MG/DL
INR PPP: 1.3 (ref 2–3)
PHOSPHATE SERPL-MCNC: 3.3 MG/DL
POST FEV1 FVC: 0.67
POST FEV1: 2.87
POST FVC: 4.29
POTASSIUM SERPL-SCNC: 4.4 MMOL/L
PRE FEV1 FVC: 71
PRE FEV1: 2.72
PRE FVC: 3.82
PREDICTED FEV1 FVC: 79
PREDICTED FEV1: 3.68
PREDICTED FVC: 4.58
SODIUM SERPL-SCNC: 139 MMOL/L

## 2017-03-01 PROCEDURE — 36415 COLL VENOUS BLD VENIPUNCTURE: CPT

## 2017-03-01 PROCEDURE — 80069 RENAL FUNCTION PANEL: CPT

## 2017-03-01 PROCEDURE — 82330 ASSAY OF CALCIUM: CPT

## 2017-03-01 PROCEDURE — 94729 DIFFUSING CAPACITY: CPT | Mod: S$GLB,,, | Performed by: INTERNAL MEDICINE

## 2017-03-01 PROCEDURE — 94060 EVALUATION OF WHEEZING: CPT | Mod: S$GLB,,, | Performed by: INTERNAL MEDICINE

## 2017-03-01 PROCEDURE — 99211 OFF/OP EST MAY X REQ PHY/QHP: CPT | Mod: 25,S$GLB,,

## 2017-03-01 PROCEDURE — 85610 PROTHROMBIN TIME: CPT | Mod: QW,S$GLB,,

## 2017-03-01 NOTE — PROGRESS NOTES
Pt confirms dose as 5mg daily. States no changes in diet or medications. INR trending down despite incorrect dose. Will boost and try new dose. I have reviewed the student's initial findings and agree with their assessment. I have personally spoken with and assessed the patient in clinic to devise care plan.

## 2017-03-01 NOTE — PROGRESS NOTES
Cr increased at 2.0 --> his baseline is 1.2 to 1.8; inquire regarding new medications, hypotension, diarrhea, vomiting; anything that can cause KATINA. Encourage hydration. Repeat labs early next week, including Prograf level

## 2017-03-02 ENCOUNTER — TELEPHONE (OUTPATIENT)
Dept: TRANSPLANT | Facility: CLINIC | Age: 62
End: 2017-03-02

## 2017-03-02 NOTE — TELEPHONE ENCOUNTER
Layne Chairez MD  [facility] Post-Kidney Transplant Clinical                     Cr increased at 2.0 --> his baseline is 1.2 to 1.8; inquire regarding new medications, hypotension, diarrhea, vomiting; anything that can cause KATINA. Encourage hydration. Repeat labs early next week, including Prograf level         Spoke to pt, reviewed lab results, pt denies any new medication or symptoms as above.  He reports that he is returning to work next week,  will repeat labs with his scheduled coumadin labs.

## 2017-03-07 ENCOUNTER — PATIENT MESSAGE (OUTPATIENT)
Dept: INTERNAL MEDICINE | Facility: CLINIC | Age: 62
End: 2017-03-07

## 2017-03-08 ENCOUNTER — TELEPHONE (OUTPATIENT)
Dept: INTERNAL MEDICINE | Facility: CLINIC | Age: 62
End: 2017-03-08

## 2017-03-08 NOTE — TELEPHONE ENCOUNTER
Unclear reason he needs antibiotics for Dental surgery. Does he have Valvular Heart Disease. No record in Problem List or last note from Dr. Miranda. Please call patient for clarification.

## 2017-03-08 NOTE — TELEPHONE ENCOUNTER
----- Message from Laurita Frost sent at 3/8/2017 12:49 PM CST -----  Contact: Shea-spouse  [phone]  Pt's spouse states the pt is having dental work done on Friday and he needs antibiotics to be called in to [facility] [phone] (Phone) or [phone] (Fax).

## 2017-03-09 NOTE — TELEPHONE ENCOUNTER
Hi, I am not aware of the practice of using antibiotics prior to dental cleaning in someone who has had pericarditis. We do give antibiotics prior to dental cleaning for patients who have artificial heart valves.  Thank you, Mina Cordon

## 2017-03-09 NOTE — TELEPHONE ENCOUNTER
Spoke to wife and states that they told patient he would need antibiotics prior to teeth cleaning because of pericarditis----pls advise

## 2017-03-10 ENCOUNTER — ANTI-COAG VISIT (OUTPATIENT)
Dept: CARDIOLOGY | Facility: CLINIC | Age: 62
End: 2017-03-10
Payer: COMMERCIAL

## 2017-03-10 ENCOUNTER — LAB VISIT (OUTPATIENT)
Dept: LAB | Facility: HOSPITAL | Age: 62
End: 2017-03-10
Payer: COMMERCIAL

## 2017-03-10 ENCOUNTER — TELEPHONE (OUTPATIENT)
Dept: CARDIOLOGY | Facility: CLINIC | Age: 62
End: 2017-03-10

## 2017-03-10 DIAGNOSIS — I48.91 ATRIAL FIBRILLATION WITH RAPID VENTRICULAR RESPONSE: ICD-10-CM

## 2017-03-10 DIAGNOSIS — Z79.01 LONG TERM (CURRENT) USE OF ANTICOAGULANTS: Primary | ICD-10-CM

## 2017-03-10 DIAGNOSIS — Z94.0 KIDNEY REPLACED BY TRANSPLANT: ICD-10-CM

## 2017-03-10 LAB
ALBUMIN SERPL BCP-MCNC: 3.2 G/DL
ANION GAP SERPL CALC-SCNC: 10 MMOL/L
BASOPHILS # BLD AUTO: 0.01 K/UL
BASOPHILS NFR BLD: 0.1 %
BUN SERPL-MCNC: 18 MG/DL
CALCIUM SERPL-MCNC: 7.4 MG/DL
CHLORIDE SERPL-SCNC: 109 MMOL/L
CO2 SERPL-SCNC: 23 MMOL/L
CREAT SERPL-MCNC: 1.8 MG/DL
DIFFERENTIAL METHOD: ABNORMAL
EOSINOPHIL # BLD AUTO: 0.4 K/UL
EOSINOPHIL NFR BLD: 5.6 %
ERYTHROCYTE [DISTWIDTH] IN BLOOD BY AUTOMATED COUNT: 14.5 %
EST. GFR  (AFRICAN AMERICAN): 45.6 ML/MIN/1.73 M^2
EST. GFR  (NON AFRICAN AMERICAN): 39.5 ML/MIN/1.73 M^2
GLUCOSE SERPL-MCNC: 105 MG/DL
HCT VFR BLD AUTO: 38.8 %
HGB BLD-MCNC: 12.5 G/DL
INR PPP: 2.1 (ref 2–3)
LYMPHOCYTES # BLD AUTO: 1.8 K/UL
LYMPHOCYTES NFR BLD: 27 %
MCH RBC QN AUTO: 25.6 PG
MCHC RBC AUTO-ENTMCNC: 32.2 %
MCV RBC AUTO: 79 FL
MONOCYTES # BLD AUTO: 0.5 K/UL
MONOCYTES NFR BLD: 7.9 %
NEUTROPHILS # BLD AUTO: 4 K/UL
NEUTROPHILS NFR BLD: 59.3 %
PHOSPHATE SERPL-MCNC: 3.8 MG/DL
PLATELET # BLD AUTO: 171 K/UL
PMV BLD AUTO: 11.6 FL
POTASSIUM SERPL-SCNC: 3.8 MMOL/L
RBC # BLD AUTO: 4.89 M/UL
SODIUM SERPL-SCNC: 142 MMOL/L
TACROLIMUS BLD-MCNC: 7.9 NG/ML
WBC # BLD AUTO: 6.75 K/UL

## 2017-03-10 PROCEDURE — 80197 ASSAY OF TACROLIMUS: CPT

## 2017-03-10 PROCEDURE — 36415 COLL VENOUS BLD VENIPUNCTURE: CPT

## 2017-03-10 PROCEDURE — 85025 COMPLETE CBC W/AUTO DIFF WBC: CPT

## 2017-03-10 PROCEDURE — 85610 PROTHROMBIN TIME: CPT | Mod: QW,S$GLB,, | Performed by: PHARMACIST

## 2017-03-10 PROCEDURE — 80069 RENAL FUNCTION PANEL: CPT

## 2017-03-10 PROCEDURE — 99211 OFF/OP EST MAY X REQ PHY/QHP: CPT | Mod: 25,S$GLB,, | Performed by: PHARMACIST

## 2017-03-10 RX ORDER — IBUPROFEN 200 MG
TABLET ORAL
Qty: 28 PATCH | Refills: 6 | Status: SHIPPED | OUTPATIENT
Start: 2017-03-10 | End: 2017-10-24 | Stop reason: SDUPTHER

## 2017-03-10 NOTE — TELEPHONE ENCOUNTER
----- Message from Shagufta Blunt MD sent at 3/10/2017 10:13 AM CST -----  Contact: Patti [phone] pt wife  He doesn't need any antibiotics for his heart.  ----- Message -----     From: Sharifa Tena MA     Sent: 3/10/2017   9:54 AM       To: Shagufta Blunt MD    I cannot tell if he needs it or not.  ----- Message -----     From: Opal Mohan     Sent: 3/9/2017   3:55 PM       To: [facility] Staff    Pt wife wants to know if the pt should take antibiotics before having dental work, please call pt wife.    Thanks

## 2017-03-10 NOTE — PROGRESS NOTES
Patient denies any changes in diet, medications, or health that would effect warfarin therapy.  Patient was re-educated on situations that would require placing a call to the coumadin clinic, including bleeding or unusual bruising issues, changes in health, diet or medications, upcoming procedures that require warfarin interruption, and missed coumadin dose(s). Patient expressed understanding that avoidance of consistency with these parameters could cause fluctuations in INR, leading to more frequent visits and increase risk of adverse events.     Patient refusing to return sooner than 3 weeks despite my recommendation to return sooner. He reportedly understands risks

## 2017-03-10 NOTE — PROGRESS NOTES
Prograf higher than goal - has there been a recent dose change? Let's repeat the level. Hypocalcemia --> please send to general nephrologist to manage

## 2017-03-14 ENCOUNTER — TELEPHONE (OUTPATIENT)
Dept: TRANSPLANT | Facility: CLINIC | Age: 62
End: 2017-03-14

## 2017-03-14 NOTE — TELEPHONE ENCOUNTER
Layne Chairez MD  [facility] Post-Kidney Transplant Clinical                     Prograf higher than goal - has there been a recent dose change? Let's repeat the level. Hypocalcemia --> please send to general nephrologist to manage         Left voicemail message with above information and request to return call to schedule repeat prograf level.

## 2017-03-14 NOTE — TELEPHONE ENCOUNTER
Layne Chairez MD  P [facility] Post-Kidney Transplant Clinical                     Prograf higher than goal - has there been a recent dose change? Let's repeat the level. Hypocalcemia --> please send to general nephrologist to manage              Spoke to pt's wife, she reports that pt has returned to work and cannot get labs during the week, confirmed repeat prograf level scheduled for Saturday 3/18/17.

## 2017-03-15 ENCOUNTER — TELEPHONE (OUTPATIENT)
Dept: NEPHROLOGY | Facility: CLINIC | Age: 62
End: 2017-03-15

## 2017-03-15 NOTE — TELEPHONE ENCOUNTER
I was notified by pt's kidney txp team for a low corrected calcium of 8. Is he feeling well? Also please verify his vitamin D3 dose of 1000u BID and calcium dosing.

## 2017-03-17 NOTE — TELEPHONE ENCOUNTER
Spoke with wife, Shea, who said he is feeling fine.      Is taking calcium liquid to ml BID (but she is not sure he takes it twice a day)  He takes centrum silver  And D3 1000 units once a day and he is talking Zemplar 1 mcg MWF.    He is going to have labs tomorrow to prograf level.

## 2017-03-18 ENCOUNTER — LAB VISIT (OUTPATIENT)
Dept: LAB | Facility: HOSPITAL | Age: 62
End: 2017-03-18
Attending: INTERNAL MEDICINE
Payer: COMMERCIAL

## 2017-03-18 DIAGNOSIS — Z94.0 KIDNEY REPLACED BY TRANSPLANT: ICD-10-CM

## 2017-03-18 LAB — TACROLIMUS BLD-MCNC: 8 NG/ML

## 2017-03-18 PROCEDURE — 80197 ASSAY OF TACROLIMUS: CPT

## 2017-03-18 PROCEDURE — 36415 COLL VENOUS BLD VENIPUNCTURE: CPT

## 2017-03-20 ENCOUNTER — TELEPHONE (OUTPATIENT)
Dept: TRANSPLANT | Facility: CLINIC | Age: 62
End: 2017-03-20

## 2017-03-20 RX ORDER — TACROLIMUS 1 MG/1
CAPSULE ORAL
Qty: 240 CAPSULE | Refills: 2 | Status: SHIPPED | OUTPATIENT
Start: 2017-03-20 | End: 2017-07-19 | Stop reason: SDUPTHER

## 2017-03-20 NOTE — PROGRESS NOTES
Prograf remains higher than goal of 4-6 --> if true 12 hour trough decrease Prograf from 4 mg BID to 4/3. Repeat level in 7-10 days

## 2017-03-20 NOTE — TELEPHONE ENCOUNTER
Layne Chairez MD  P [facility] Post-Kidney Transplant Clinical                     Prograf remains higher than goal of 4-6 --> if true 12 hour trough decrease Prograf from 4 mg BID to 4/3. Repeat level in 7-10 days         Spoke to pt's wife, she confirms that pt is working until 7pm, reviewed labs and above changes, she verbalized understanding and agreed.  He has apt on 3/31/17 with the coumadin clinic at 8:00, will repeat prograf level at 8:30.

## 2017-03-31 ENCOUNTER — LAB VISIT (OUTPATIENT)
Dept: LAB | Facility: HOSPITAL | Age: 62
End: 2017-03-31
Payer: COMMERCIAL

## 2017-03-31 ENCOUNTER — ANTI-COAG VISIT (OUTPATIENT)
Dept: CARDIOLOGY | Facility: CLINIC | Age: 62
End: 2017-03-31
Payer: COMMERCIAL

## 2017-03-31 DIAGNOSIS — Z79.01 LONG TERM (CURRENT) USE OF ANTICOAGULANTS: Primary | ICD-10-CM

## 2017-03-31 DIAGNOSIS — Z94.0 KIDNEY REPLACED BY TRANSPLANT: ICD-10-CM

## 2017-03-31 DIAGNOSIS — I48.91 ATRIAL FIBRILLATION WITH RAPID VENTRICULAR RESPONSE: ICD-10-CM

## 2017-03-31 LAB
INR PPP: 2.9 (ref 2–3)
TACROLIMUS BLD-MCNC: 5.9 NG/ML

## 2017-03-31 PROCEDURE — 85610 PROTHROMBIN TIME: CPT | Mod: QW,S$GLB,, | Performed by: PHARMACIST

## 2017-03-31 PROCEDURE — 36415 COLL VENOUS BLD VENIPUNCTURE: CPT

## 2017-03-31 PROCEDURE — 80197 ASSAY OF TACROLIMUS: CPT

## 2017-04-12 DIAGNOSIS — N18.30 CKD (CHRONIC KIDNEY DISEASE), STAGE III: ICD-10-CM

## 2017-04-12 RX ORDER — MYCOPHENOLATE MOFETIL 250 MG/1
CAPSULE ORAL
Qty: 360 CAPSULE | Refills: 2 | Status: CANCELLED | OUTPATIENT
Start: 2017-04-12

## 2017-04-21 ENCOUNTER — ANTI-COAG VISIT (OUTPATIENT)
Dept: CARDIOLOGY | Facility: CLINIC | Age: 62
End: 2017-04-21
Payer: COMMERCIAL

## 2017-04-21 DIAGNOSIS — I48.91 ATRIAL FIBRILLATION WITH RAPID VENTRICULAR RESPONSE: ICD-10-CM

## 2017-04-21 DIAGNOSIS — Z79.01 LONG TERM (CURRENT) USE OF ANTICOAGULANTS: Primary | ICD-10-CM

## 2017-04-21 LAB — INR PPP: 2.9 (ref 2–3)

## 2017-04-21 PROCEDURE — 85610 PROTHROMBIN TIME: CPT | Mod: QW,S$GLB,, | Performed by: PHARMACIST

## 2017-04-28 DIAGNOSIS — N18.30 CKD (CHRONIC KIDNEY DISEASE), STAGE III: ICD-10-CM

## 2017-04-28 RX ORDER — MYCOPHENOLATE MOFETIL 250 MG/1
CAPSULE ORAL
Qty: 360 CAPSULE | Refills: 3 | Status: SHIPPED | OUTPATIENT
Start: 2017-04-28 | End: 2018-05-04 | Stop reason: SDUPTHER

## 2017-04-28 NOTE — TELEPHONE ENCOUNTER
----- Message from Margarita Mendez sent at 4/28/2017  1:57 PM CDT -----  Contact: [facility] [phone]  Pt is requesting a prescription refill for mycophenolate (CELLCEPT) 250 mg Cap    [facility]  [facility] - [address]   [phone] (Phone)  [phone] (Fax)

## 2017-05-05 ENCOUNTER — HOSPITAL ENCOUNTER (EMERGENCY)
Facility: HOSPITAL | Age: 62
Discharge: HOME | End: 2017-05-06
Attending: EMERGENCY MEDICINE
Payer: COMMERCIAL

## 2017-05-05 DIAGNOSIS — R06.00 DYSPNEA: ICD-10-CM

## 2017-05-05 DIAGNOSIS — I10 ESSENTIAL HYPERTENSION: ICD-10-CM

## 2017-05-05 DIAGNOSIS — I48.91 ATRIAL FIBRILLATION WITH RAPID VENTRICULAR RESPONSE: Primary | ICD-10-CM

## 2017-05-05 DIAGNOSIS — E87.6 HYPOKALEMIA: ICD-10-CM

## 2017-05-05 PROCEDURE — 99285 EMERGENCY DEPT VISIT HI MDM: CPT | Mod: ,,, | Performed by: EMERGENCY MEDICINE

## 2017-05-05 PROCEDURE — 96374 THER/PROPH/DIAG INJ IV PUSH: CPT

## 2017-05-05 PROCEDURE — 99284 EMERGENCY DEPT VISIT MOD MDM: CPT | Mod: 25

## 2017-05-05 NOTE — ED AVS SNAPSHOT
[facility]  [address] [facility] [address]               Ibrahima Phelps   2017 11:52 PM   ED    Description:  Male : 1955   Department:  [facility]           Your Care was Coordinated By:     Provider Role From To    Hernandez Franco MD Attending Provider 17 0006 --      Reason for Visit     Palpitations           Diagnoses this Visit        Comments    Atrial fibrillation with rapid ventricular response    -  Primary     Dyspnea         Essential hypertension         Hypokalemia           ED Disposition     None           To Do List           Follow-up Information     Follow up with Shagufta Blunt MD. Call in 3 days.    Specialty:  Cardiology    Why:  for an appointment in clinic as soon as possible    Contact information:    [address] [facility] [address]  [phone]          [facility]    Specialty:  Emergency Medicine    Why:  If symptoms worsen    Contact information:    [address] [facility] [id number] [phone]      [facility]     [facility] Nurse Care Line -  Assistance  Unless otherwise directed by your provider, please contact [facility], our nurse care line that is available for  assistance.     Registered nurses in the [facility] provide: appointment scheduling, clinical advisement, health education, and other advisory services.  Call: [phone] (toll free)               Medications           Message regarding Medications     Verify the changes and/or additions to your medication regime listed below are the same as discussed with your clinician today.  If any of these changes or additions are incorrect, please notify your healthcare provider.        These medications were administered today        Dose Freq    metoprolol tartrate (LOPRESSOR) tablet 50 mg 50 mg ED 1 Time    Sig: Take 1 tablet (50 mg total) by  mouth ED 1 Time.    Class: Normal    Route: Oral    metoprolol injection 5 mg 5 mg ED 1 Time    Sig: Inject 5 mLs (5 mg total) into the vein ED 1 Time.    Class: Normal    Route: Intravenous    potassium chloride 10% solution 40 mEq 40 mEq ED 1 Time    Sig: Take 30 mLs (40 mEq total) by mouth ED 1 Time.    Class: Normal    Route: Oral           Verify that the below list of medications is an accurate representation of the medications you are currently taking.  If none reported, the list may be blank. If incorrect, please contact your healthcare provider. Carry this list with you in case of emergency.           Current Medications     aspirin (ADULT ASPIRIN EC LOW STRENGTH) 81 MG EC tablet Take 1 tablet by mouth Daily.    atorvastatin (LIPITOR) 10 MG tablet Take 10 mg by mouth once daily.    calcium carbonate 500 mg/5 mL (1,250 mg/5 mL) Take 10 mLs (1,000 mg total) by mouth 2 (two) times daily.    colchicine 0.6 mg tablet Take 0.6 mg by mouth once daily.    doxazosin (CARDURA) 4 MG tablet Take 4 mg by mouth every 12 (twelve) hours. Patient takine one tablet twice daily    fexofenadine (ALLEGRA) 60 MG tablet Take 1 tablet by mouth Twice daily as needed.    fluticasone (FLONASE) 50 mcg/actuation nasal spray 1 spray by Each Nare route once daily.    furosemide (LASIX) 40 MG tablet Take 0.5 tablets (20 mg total) by mouth 2 (two) times daily.    metoprolol tartrate (LOPRESSOR) 50 MG tablet Take 1 tablet (50 mg total) by mouth 2 (two) times daily.    multivitamin (THERAGRAN) per tablet Take 1 tablet by mouth Daily.    mycophenolate (CELLCEPT) 250 mg Cap TAKE 2 CAPSULES BY MOUTH EVERY 12 HOURS TO PREVENT KIDNEY TRANSPLANT REJECTION    nicotine (NTS STEP 1) 21 mg/24 hr PLACE 1 PATCH ONTO THE SKIN ONCE DAILY.    nifedipine (ADALAT CC) 60 MG TbSR TAKE 1 TABLET BY MOUTH TWICE DAILY.    pantoprazole (PROTONIX) 40 MG tablet TAKE 1 TABLET BY MOUTH DAILY    paricalcitol (ZEMPLAR) 1 MCG capsule One po MW    potassium chloride SA  (K-DUR,KLOR-CON) 20 MEQ tablet TAKE 1 TABLET BY MOUTH DAILY    predniSONE (DELTASONE) 5 MG tablet TAKE 1 TABLET BY MOUTH EVERY MORNING    spironolactone (ALDACTONE) 25 MG tablet Take 1 tablet (25 mg total) by mouth once daily.    tacrolimus (PROGRAF) 1 MG Cap Take 4mg every AM and 3mg every PM by mouth.  Z94.0 kidney transplant.    vitamin D 1000 units Tab Take 370 mg by mouth 2 (two) times daily. 2 tablets BID    warfarin (COUMADIN) 5 MG tablet Take 1 tablet (5mg.) by mouth daily, except 1.5 (7.5mg) on Wednesday,  Or as directed by [facility]    metoprolol injection 5 mg Inject 5 mLs (5 mg total) into the vein ED 1 Time.    metoprolol tartrate (LOPRESSOR) tablet 50 mg Take 1 tablet (50 mg total) by mouth ED 1 Time.           Clinical Reference Information           Your Vitals Were     BP Pulse Temp Resp Weight SpO2    124/65 85 98.6 °F (37 °C) 18 99.8 kg (220 lb) 96%    BMI                29.03 kg/m2          Allergies as of 5/6/2017        Reactions    Ace Inhibitors     Other reaction(s): Swelling    Povidone-iodine     Other reaction(s): Itching  Other reaction(s): Itching    Verapamil Other (See Comments)    Other reaction(s): Unknown      Immunizations Administered on Date of Encounter - 5/6/2017     None      ED Micro, Lab, POCT     Start Ordered       Status Ordering Provider    05/06/17 0025 05/06/17 0025  Comprehensive metabolic panel  STAT      Final result     05/06/17 0025 05/06/17 0025  Brain natriuretic peptide  STAT      Final result     05/06/17 0025 05/06/17 0025  CBC auto differential  STAT      Final result     05/06/17 0025 05/06/17 0025  Phosphorus  STAT      Final result     05/06/17 0025 05/06/17 0025  Protime-INR  STAT      Final result     05/06/17 0025 05/06/17 0025  Troponin I  STAT      Final result     05/06/17 0025 05/06/17 0025  Magnesium  STAT      Final result       ED Imaging Orders     Start Ordered       Status Ordering Provider    05/06/17 0026 05/06/17 0025  X-Ray Chest 1  View  1 time imaging      Final result       Discharge References/Attachments     ATRIAL FIBRILLATION, DISCHARGE INSTRUCTIONS FOR (ENGLISH)    HYPOKALEMIA (ENGLISH)      Your Scheduled Appointments     May 26, 2017  7:00 AM CDT   Non-Fasting Lab with SPECIMEN, MA[facility] ([facility])    [address] [facility] [address]   [phone]            May 26, 2017  7:05 AM CDT   Urine with SPECIMEN, MA[facility] ([facility])    [address] [facility] [address]   [phone]            May 26, 2017  8:00 AM CDT   Anticoagulation with Jeffery Corbin PharmD   [facility] - Coumadin ([facility] )    [facility]  [place] [address]   [phone]            Jun 09, 2017  8:20 AM CDT   Established Patient Visit with Emre Latif MD   [facility] ([facility] [place])    [address] [facility] [address] [place] [id number] [phone]              Smoking Cessation     If you would like to quit smoking:   You may be eligible for free services if you are a [place] resident and started smoking cigarettes before September 1, 1988.  Call the Smoking Cessation Trust ([facility]) toll free at (485) [phone] or ([phone].   Call 1-800-QUIT-NOW if you do not meet the above criteria.   Contact us via email: [email or web address]   View our website for more information: [email or web address]         [facility] complies with applicable Federal civil rights laws and does not discriminate on the basis of race, color, national origin, age, disability, or sex.        Language Assistance Services     ATTENTION: Language assistance services are available, free of charge. Please call [phone].      ATENCIÓN: Si habla español, tiene a chicas disposición servicios gratuitos de asistencia lingüística. Llame al [phone].     CHÚ Ý: N?u  b?n nói Ti?ng Vi?t, có các d?ch v? h? tr? ngôn ng? mi?n phí dành cho b?n. G?i s? [phone].

## 2017-05-06 VITALS
SYSTOLIC BLOOD PRESSURE: 154 MMHG | RESPIRATION RATE: 18 BRPM | DIASTOLIC BLOOD PRESSURE: 83 MMHG | TEMPERATURE: 99 F | WEIGHT: 220 LBS | OXYGEN SATURATION: 99 % | HEART RATE: 97 BPM | BODY MASS INDEX: 29.03 KG/M2

## 2017-05-06 LAB
ALBUMIN SERPL BCP-MCNC: 3.1 G/DL
ALP SERPL-CCNC: 76 U/L
ALT SERPL W/O P-5'-P-CCNC: 16 U/L
ANION GAP SERPL CALC-SCNC: 12 MMOL/L
AST SERPL-CCNC: 22 U/L
BASOPHILS # BLD AUTO: 0.02 K/UL
BASOPHILS NFR BLD: 0.3 %
BILIRUB SERPL-MCNC: 0.2 MG/DL
BNP SERPL-MCNC: 133 PG/ML
BUN SERPL-MCNC: 24 MG/DL
CALCIUM SERPL-MCNC: 7.3 MG/DL
CHLORIDE SERPL-SCNC: 108 MMOL/L
CO2 SERPL-SCNC: 21 MMOL/L
CREAT SERPL-MCNC: 1.9 MG/DL
DIFFERENTIAL METHOD: ABNORMAL
EOSINOPHIL # BLD AUTO: 0.2 K/UL
EOSINOPHIL NFR BLD: 3 %
ERYTHROCYTE [DISTWIDTH] IN BLOOD BY AUTOMATED COUNT: 14.6 %
EST. GFR  (AFRICAN AMERICAN): 42.7 ML/MIN/1.73 M^2
EST. GFR  (NON AFRICAN AMERICAN): 37 ML/MIN/1.73 M^2
GLUCOSE SERPL-MCNC: 132 MG/DL
HCT VFR BLD AUTO: 36.3 %
HGB BLD-MCNC: 12 G/DL
INR PPP: 2.9
LYMPHOCYTES # BLD AUTO: 1.9 K/UL
LYMPHOCYTES NFR BLD: 25.2 %
MAGNESIUM SERPL-MCNC: 1.9 MG/DL
MCH RBC QN AUTO: 25.8 PG
MCHC RBC AUTO-ENTMCNC: 33.1 %
MCV RBC AUTO: 78 FL
MONOCYTES # BLD AUTO: 0.6 K/UL
MONOCYTES NFR BLD: 8 %
NEUTROPHILS # BLD AUTO: 4.9 K/UL
NEUTROPHILS NFR BLD: 63.5 %
PHOSPHATE SERPL-MCNC: 3.2 MG/DL
PLATELET # BLD AUTO: 170 K/UL
PLATELET BLD QL SMEAR: ABNORMAL
PMV BLD AUTO: 11.4 FL
POTASSIUM SERPL-SCNC: 3.4 MMOL/L
PROT SERPL-MCNC: 6.2 G/DL
PROTHROMBIN TIME: 29 SEC
RBC # BLD AUTO: 4.65 M/UL
SODIUM SERPL-SCNC: 141 MMOL/L
TROPONIN I SERPL DL<=0.01 NG/ML-MCNC: 0.02 NG/ML
WBC # BLD AUTO: 7.67 K/UL

## 2017-05-06 PROCEDURE — 80053 COMPREHEN METABOLIC PANEL: CPT

## 2017-05-06 PROCEDURE — 83735 ASSAY OF MAGNESIUM: CPT

## 2017-05-06 PROCEDURE — 84484 ASSAY OF TROPONIN QUANT: CPT

## 2017-05-06 PROCEDURE — 25000003 PHARM REV CODE 250: Performed by: EMERGENCY MEDICINE

## 2017-05-06 PROCEDURE — 84100 ASSAY OF PHOSPHORUS: CPT

## 2017-05-06 PROCEDURE — 85610 PROTHROMBIN TIME: CPT

## 2017-05-06 PROCEDURE — 83880 ASSAY OF NATRIURETIC PEPTIDE: CPT

## 2017-05-06 PROCEDURE — 85025 COMPLETE CBC W/AUTO DIFF WBC: CPT

## 2017-05-06 RX ORDER — METOPROLOL TARTRATE 50 MG/1
50 TABLET ORAL
Status: COMPLETED | OUTPATIENT
Start: 2017-05-06 | End: 2017-05-06

## 2017-05-06 RX ORDER — POTASSIUM CHLORIDE 20 MEQ/15ML
40 SOLUTION ORAL
Status: COMPLETED | OUTPATIENT
Start: 2017-05-06 | End: 2017-05-06

## 2017-05-06 RX ORDER — METOPROLOL TARTRATE 1 MG/ML
5 INJECTION, SOLUTION INTRAVENOUS
Status: COMPLETED | OUTPATIENT
Start: 2017-05-06 | End: 2017-05-06

## 2017-05-06 RX ADMIN — POTASSIUM CHLORIDE 40 MEQ: 20 SOLUTION ORAL at 02:05

## 2017-05-06 RX ADMIN — METOPROLOL TARTRATE 50 MG: 50 TABLET, FILM COATED ORAL at 12:05

## 2017-05-06 RX ADMIN — METOPROLOL TARTRATE 5 MG: 5 INJECTION INTRAVENOUS at 12:05

## 2017-05-06 NOTE — ED PROVIDER NOTES
Encounter Date: 5/5/2017    SCRIBE #1 NOTE: I, Duncan Kerr, am scribing for, and in the presence of,  Dr. Franco. I have scribed the following portions of the note - Other sections scribed: HPI, ROS.       History     Chief Complaint   Patient presents with    Palpitations     pt presents to the ed with palpatations hx of afib      Review of patient's allergies indicates:   Allergen Reactions    Ace inhibitors      Other reaction(s): Swelling    Povidone-iodine      Other reaction(s): Itching  Other reaction(s): Itching    Verapamil Other (See Comments)     Other reaction(s): Unknown     HPI Comments: Time seen by provider: 12:18 AM    This is a 61 y.o. male with pertinent PMHx of s/p kidney transplant, CKD stage 3, HTN, metabolic bone disease, CAD, HLD, thrombocytopenia, diverticulosis, atrial fibrillation, and pericarditis, who presents to the ED with a chief complaint of palpitations with associated shortness of breath that started ~8:30 pm tonight. Pt states he had atrial fibrillation 1-1.5 years ago and states this feels similar to that. Pt states that at that time he was cardioverted successfully and was returned to normal rhythm. However his blood thinner was changed from Plavix to warfarin at that time (Plavix was due to being a kidney transplant recipient, unsure why placed on warfarin other than the afib). Pt also takes daily aspirin. Pt states he has been compliant with all medications though he has not yet taken his night medications. Pt denies chest pain, leg swelling, cough, cold symptoms, new medications, appetite loss, nausea, vomiting, diarrhea, abdominal pain, syncope, or recent travel. There are no other associated symptoms or mitigating/aggravating factors reported at this time.     The history is provided by the patient, medical records and the spouse.     Past Medical History:   Diagnosis Date    Atrial fibrillation     CAD (coronary artery disease)     Diverticulosis      Hyperlipidemia     Hypertension     Immunodeficiency due to treatment with immunosuppressive medication     Metabolic bone disease     Pericarditis     S/P kidney transplant     Thrombocytopenia     Thyroid disease     Unspecified disorder of kidney and ureter     Stage IIICKD     Past Surgical History:   Procedure Laterality Date    CARDIOVERSION  04/30/15    CHOLECYSTECTOMY      COLONOSCOPY  April 20, 2011    Diverticulosis, repeat recommended in 3 yrs., repeat colonoscopy 2014 revealed 2 polyps.  He should return in 5 years.    CORONARY ANGIOPLASTY WITH STENT PLACEMENT  9/13/2006    KIDNEY TRANSPLANT  2005    PARATHYROIDECTOMY       Family History   Problem Relation Age of Onset    No Known Problems Sister     No Known Problems Brother     Thyroid disease Mother      s/p surgery    Heart disease Father      had pacemaker    Heart disease Daughter      enlarged heart    No Known Problems Sister     Kidney failure Sister     Kidney disease Sister     No Known Problems Sister     Kidney disease Brother     Kidney failure Brother      s/p transplant    No Known Problems Brother     Diabetes Mellitus Neg Hx     Stroke Neg Hx     Heart attack Neg Hx     Cancer Neg Hx     Celiac disease Neg Hx     Cirrhosis Neg Hx     Colon cancer Neg Hx     Esophageal cancer Neg Hx     Inflammatory bowel disease Neg Hx     Rectal cancer Neg Hx     Stomach cancer Neg Hx     Ulcerative colitis Neg Hx     Liver disease Neg Hx     Liver cancer Neg Hx     Crohn's disease Neg Hx      Social History   Substance Use Topics    Smoking status: Current Every Day Smoker     Packs/day: 0.50     Years: 40.00     Types: Cigarettes    Smokeless tobacco: Never Used      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    Alcohol use No     Review of Systems   Constitutional: Negative for appetite change, chills and fever.   HENT: Negative for congestion, rhinorrhea,  sinus pressure and sneezing.    Eyes: Negative for pain.   Respiratory: Positive for shortness of breath. Negative for cough.    Cardiovascular: Positive for palpitations (and heart racing). Negative for chest pain and leg swelling.   Gastrointestinal: Negative for abdominal pain, diarrhea, nausea and vomiting.   Genitourinary: Negative for difficulty urinating and dysuria.   Musculoskeletal: Negative for back pain and neck pain.   Skin: Negative for rash.   Neurological: Negative for syncope, weakness and headaches.       Physical Exam   Initial Vitals   BP Pulse Resp Temp SpO2   05/05/17 2220 05/05/17 2220 05/05/17 2220 05/05/17 2220 05/05/17 2220   136/85 115 18 98.6 °F (37 °C) 98 %     Physical Exam    Nursing note and vitals reviewed.  Constitutional: He appears well-developed and well-nourished. He is not diaphoretic. No distress.   HENT:   Head: Normocephalic and atraumatic.   Right Ear: External ear normal.   Left Ear: External ear normal.   Mouth/Throat: Oropharynx is clear and moist.   Eyes: Conjunctivae are normal. Pupils are equal, round, and reactive to light. Right eye exhibits no discharge. Left eye exhibits no discharge. No scleral icterus.   Neck: Normal range of motion. Neck supple. No JVD present.   Cardiovascular: Normal heart sounds and intact distal pulses.   No murmur heard.  irreg irreg, rate 110s     Pulmonary/Chest: Breath sounds normal. No stridor. No respiratory distress. He has no wheezes. He has no rales.   Abdominal: Soft. Bowel sounds are normal. He exhibits no distension. There is no tenderness. There is no rebound.   Musculoskeletal: Normal range of motion. He exhibits no edema or tenderness.   Neurological: He is alert and oriented to person, place, and time.   Skin: Skin is warm and dry. No rash noted. No erythema.   Psychiatric: He has a normal mood and affect.         ED Course   Procedures  Labs Reviewed   COMPREHENSIVE METABOLIC PANEL - Abnormal; Notable for the following:         Result Value    Potassium 3.4 (*)     CO2 21 (*)     Glucose 132 (*)     BUN, Bld 24 (*)     Creatinine 1.9 (*)     Calcium 7.3 (*)     Albumin 3.1 (*)     eGFR if  42.7 (*)     eGFR if non  37.0 (*)     All other components within normal limits   B-TYPE NATRIURETIC PEPTIDE - Abnormal; Notable for the following:      (*)     All other components within normal limits   CBC W/ AUTO DIFFERENTIAL - Abnormal; Notable for the following:     Hemoglobin 12.0 (*)     Hematocrit 36.3 (*)     MCV 78 (*)     MCH 25.8 (*)     RDW 14.6 (*)     All other components within normal limits   PROTIME-INR - Abnormal; Notable for the following:     Prothrombin Time 29.0 (*)     INR 2.9 (*)     All other components within normal limits   PHOSPHORUS   TROPONIN I   MAGNESIUM     EKG Readings: (Independently Interpreted)   afib w/ rvr, no acute st elevation       X-Rays:   Independently Interpreted Readings:   Other Readings:  Cxr:  No acute infiltrate, consolidation or effusion.      Medical Decision Making:   History:   Old Medical Records: I decided to obtain old medical records.  Initial Assessment:   Dyspnea w/ palpitations, similar to past episodes of afib, previously converted after dccv.  Aside from the rapid afib, exam unremarkable.  Low suspicion for chf, acs, pna, ptx, pe.  Rate control w/ iv lopressor and evening dose of po meds, labs, cxr, monitor and re-eval.      Mild hypokalemia, repleted.  cxr ok.  Is back in sinus in the 80s and sxs have completely resolved.  Reviewed case with cards fellow - concurs that as already adequately anticoagulated and rate controlled can d/c to keep upcoming f/u in Dr Blunt's clinic in approx 2 wks.  Pt understands to return for new/worse sxs and to continue all meds as prescribed.    Clinical Tests:   Lab Tests: Ordered and Reviewed  Radiological Study: Ordered and Reviewed            Scribe Attestation:   Scribe #1: I performed the above scribed service  and the documentation accurately describes the services I performed. I attest to the accuracy of the note.    Attending Attestation:           Physician Attestation for Scribe:  Physician Attestation Statement for Scribe #1: I, Dr. Franco, reviewed documentation, as scribed by Duncan Kerr in my presence, and it is both accurate and complete.                 ED Course     Clinical Impression:   The primary encounter diagnosis was Atrial fibrillation with rapid ventricular response. Diagnoses of Dyspnea, Essential hypertension, and Hypokalemia were also pertinent to this visit.          Hernandez Franco MD  05/07/17 [id number]

## 2017-05-06 NOTE — ED NOTES
Patient identifiers for Ibrahima Phelps  verified.     APPEARANCE: Pt clean and well groomed with clothes appropriately fastened. No distress is noted.  NEURO: Pt is awake and alert x3, Pt follows commands and affect is appropriate. Pt is moving all extremities well and sensation is intact. Speech is clear.  RESPIRATORY: Respirations are even and unlabored on room air. No accessory muscle use noted. Normal rate and effort is noted. Pt placed on continuous pulse ox with O2 sats noted at 99% on room air.   CARDIAC: Pt placed on cardiac monitor. A-fib with RVR noted. Rate is abnormal. No abnormal heart sounds noted. Radial and dorsalis pedis pulses are palpable and +2. No edema noted. Cap refill is <3 seconds.   GASTRO: Pt denies abdominal pain/tenderness. States bowel movements have been regular. Bowel sounds are present and normal.   : Pt denies any pain or frequency with urination.  SKIN: Skin is warm, dry and intact. Skin color is appropriate for ethnicity. Normal skin turgor noted. Mucous membranes are moist.   MUSCULOSKELETAL: No abnormalities are noted.

## 2017-05-06 NOTE — ED TRIAGE NOTES
Pt presents to ED with c/o palpitations since 2000 tonight. Pt states he thought he might be in a-fib. Pt states last time he went in a-fib was over a year ago. Pt c/o SOB with exertion. Denies chest pain.

## 2017-05-26 ENCOUNTER — LAB VISIT (OUTPATIENT)
Dept: LAB | Facility: HOSPITAL | Age: 62
End: 2017-05-26
Payer: COMMERCIAL

## 2017-05-26 ENCOUNTER — ANTI-COAG VISIT (OUTPATIENT)
Dept: CARDIOLOGY | Facility: CLINIC | Age: 62
End: 2017-05-26
Payer: COMMERCIAL

## 2017-05-26 DIAGNOSIS — Z79.01 LONG TERM (CURRENT) USE OF ANTICOAGULANTS: ICD-10-CM

## 2017-05-26 DIAGNOSIS — I48.91 ATRIAL FIBRILLATION WITH RAPID VENTRICULAR RESPONSE: ICD-10-CM

## 2017-05-26 DIAGNOSIS — N18.30 CKD (CHRONIC KIDNEY DISEASE) STAGE 3, GFR 30-59 ML/MIN: ICD-10-CM

## 2017-05-26 LAB
ALBUMIN SERPL BCP-MCNC: 3.6 G/DL
ALBUMIN SERPL BCP-MCNC: 3.6 G/DL
ALP SERPL-CCNC: 78 U/L
ALT SERPL W/O P-5'-P-CCNC: 14 U/L
ANION GAP SERPL CALC-SCNC: 8 MMOL/L
ANISOCYTOSIS BLD QL SMEAR: SLIGHT
AST SERPL-CCNC: 21 U/L
BASOPHILS # BLD AUTO: 0.01 K/UL
BASOPHILS NFR BLD: 0.1 %
BILIRUB DIRECT SERPL-MCNC: 0.2 MG/DL
BILIRUB SERPL-MCNC: 0.5 MG/DL
BUN SERPL-MCNC: 20 MG/DL
BURR CELLS BLD QL SMEAR: ABNORMAL
CALCIUM SERPL-MCNC: 8.6 MG/DL
CHLORIDE SERPL-SCNC: 108 MMOL/L
CO2 SERPL-SCNC: 26 MMOL/L
CREAT SERPL-MCNC: 1.7 MG/DL
DIFFERENTIAL METHOD: ABNORMAL
EOSINOPHIL # BLD AUTO: 0.3 K/UL
EOSINOPHIL NFR BLD: 3.6 %
ERYTHROCYTE [DISTWIDTH] IN BLOOD BY AUTOMATED COUNT: 14.7 %
EST. GFR  (AFRICAN AMERICAN): 48.9 ML/MIN/1.73 M^2
EST. GFR  (NON AFRICAN AMERICAN): 42.3 ML/MIN/1.73 M^2
ESTIMATED AVG GLUCOSE: 131 MG/DL
GLUCOSE SERPL-MCNC: 97 MG/DL
HBA1C MFR BLD HPLC: 6.2 %
HCT VFR BLD AUTO: 40.7 %
HGB BLD-MCNC: 12.9 G/DL
INR PPP: 2.3 (ref 2–3)
LYMPHOCYTES # BLD AUTO: 2 K/UL
LYMPHOCYTES NFR BLD: 26.1 %
MCH RBC QN AUTO: 25.4 PG
MCHC RBC AUTO-ENTMCNC: 31.7 %
MCV RBC AUTO: 80 FL
MONOCYTES # BLD AUTO: 0.5 K/UL
MONOCYTES NFR BLD: 7.2 %
NEUTROPHILS # BLD AUTO: 4.7 K/UL
NEUTROPHILS NFR BLD: 63 %
PHOSPHATE SERPL-MCNC: 3.5 MG/DL
PLATELET # BLD AUTO: 184 K/UL
PLATELET BLD QL SMEAR: ABNORMAL
PMV BLD AUTO: 11.8 FL
POTASSIUM SERPL-SCNC: 3.7 MMOL/L
PROT SERPL-MCNC: 7.1 G/DL
PTH-INTACT SERPL-MCNC: 63 PG/ML
RBC # BLD AUTO: 5.07 M/UL
SODIUM SERPL-SCNC: 142 MMOL/L
TACROLIMUS BLD-MCNC: 4.9 NG/ML
WBC # BLD AUTO: 7.5 K/UL

## 2017-05-26 PROCEDURE — 85610 PROTHROMBIN TIME: CPT | Mod: QW,S$GLB,,

## 2017-05-26 PROCEDURE — 83036 HEMOGLOBIN GLYCOSYLATED A1C: CPT

## 2017-05-26 PROCEDURE — 80069 RENAL FUNCTION PANEL: CPT

## 2017-05-26 PROCEDURE — 83970 ASSAY OF PARATHORMONE: CPT

## 2017-05-26 PROCEDURE — 84075 ASSAY ALKALINE PHOSPHATASE: CPT

## 2017-05-26 PROCEDURE — 36415 COLL VENOUS BLD VENIPUNCTURE: CPT

## 2017-05-26 PROCEDURE — 80197 ASSAY OF TACROLIMUS: CPT

## 2017-05-26 PROCEDURE — 82247 BILIRUBIN TOTAL: CPT

## 2017-05-26 PROCEDURE — 85025 COMPLETE CBC W/AUTO DIFF WBC: CPT

## 2017-05-30 RX ORDER — PREDNISONE 5 MG/1
5 TABLET ORAL EVERY MORNING
Qty: 90 TABLET | Refills: 3 | Status: SHIPPED | OUTPATIENT
Start: 2017-05-30 | End: 2017-06-01 | Stop reason: SDUPTHER

## 2017-06-02 RX ORDER — PREDNISONE 5 MG/1
5 TABLET ORAL EVERY MORNING
Qty: 90 TABLET | Refills: 3 | Status: SHIPPED | OUTPATIENT
Start: 2017-06-02 | End: 2018-07-08 | Stop reason: SDUPTHER

## 2017-06-12 ENCOUNTER — OFFICE VISIT (OUTPATIENT)
Dept: NEPHROLOGY | Facility: CLINIC | Age: 62
End: 2017-06-12
Payer: COMMERCIAL

## 2017-06-12 VITALS
DIASTOLIC BLOOD PRESSURE: 68 MMHG | OXYGEN SATURATION: 98 % | WEIGHT: 238.13 LBS | HEART RATE: 62 BPM | TEMPERATURE: 97 F | SYSTOLIC BLOOD PRESSURE: 121 MMHG | HEIGHT: 73 IN | RESPIRATION RATE: 18 BRPM | BODY MASS INDEX: 31.56 KG/M2

## 2017-06-12 DIAGNOSIS — I48.91 ATRIAL FIBRILLATION WITH RAPID VENTRICULAR RESPONSE: ICD-10-CM

## 2017-06-12 DIAGNOSIS — N18.9 ANEMIA IN CHRONIC KIDNEY DISEASE(285.21): ICD-10-CM

## 2017-06-12 DIAGNOSIS — D63.1 ANEMIA IN CHRONIC KIDNEY DISEASE(285.21): ICD-10-CM

## 2017-06-12 DIAGNOSIS — Z72.0 TOBACCO USE: ICD-10-CM

## 2017-06-12 DIAGNOSIS — N18.30 CKD (CHRONIC KIDNEY DISEASE) STAGE 3, GFR 30-59 ML/MIN: Primary | ICD-10-CM

## 2017-06-12 DIAGNOSIS — N25.81 SECONDARY RENAL HYPERPARATHYROIDISM: ICD-10-CM

## 2017-06-12 DIAGNOSIS — M89.8X9 METABOLIC BONE DISEASE: ICD-10-CM

## 2017-06-12 DIAGNOSIS — R73.03 PREDIABETES: ICD-10-CM

## 2017-06-12 DIAGNOSIS — I10 ESSENTIAL HYPERTENSION: ICD-10-CM

## 2017-06-12 DIAGNOSIS — Z94.0 H/O KIDNEY TRANSPLANT: ICD-10-CM

## 2017-06-12 PROCEDURE — 99214 OFFICE O/P EST MOD 30 MIN: CPT | Mod: S$GLB,,, | Performed by: INTERNAL MEDICINE

## 2017-06-12 PROCEDURE — 99999 PR PBB SHADOW E&M-EST. PATIENT-LVL III: CPT | Mod: PBBFAC,,, | Performed by: INTERNAL MEDICINE

## 2017-06-20 NOTE — PROGRESS NOTES
Subjective:       Patient ID: Ibrahima Phelps is a 61 y.o. Black or  male who presents for follow-up evaluation of Chronic Kidney Disease and Kidney Transplant    HPI     He had an episode on 5/26 TIA vs ?seizures--he did not seek medical care. On 5/5 he went to ER for SOB, was in a-fib (with history of) but was in RVR. His HR decreased to the 80's and he saw cardiology for follow up.  He is more compliant with the liquid calcium. No allograft pain and no problems with urination. He continues to smoke, 2 packs per week. No new medications. Weight is similar. No recent infectious illness    Review of Systems   Constitutional: Negative for activity change, appetite change, fatigue and unexpected weight change.   HENT: Negative for facial swelling.    Respiratory: Negative for shortness of breath.    Cardiovascular: Negative for chest pain and leg swelling.   Gastrointestinal: Negative for abdominal pain, constipation and diarrhea.   Genitourinary: Negative for difficulty urinating, dysuria, frequency and hematuria.   Neurological: Negative for weakness and headaches.       Objective:      Physical Exam   Constitutional: He is oriented to person, place, and time. He appears well-developed and well-nourished.   Neck: No JVD present.   Cardiovascular: S1 normal and S2 normal.  Exam reveals no friction rub.    Pulmonary/Chest: Breath sounds normal. He has no wheezes. He has no rales.   Abdominal: Soft.   Musculoskeletal: He exhibits no edema.   Neurological: He is alert and oriented to person, place, and time.   Skin: Skin is warm and dry.   Psychiatric: He has a normal mood and affect.   Vitals reviewed.      Assessment:       1. CKD (chronic kidney disease) stage 3, GFR 30-59 ml/min    2. Essential hypertension    3. Secondary renal hyperparathyroidism    4. H/O kidney transplant    5. Prediabetes    6. Tobacco use    7. Anemia in chronic kidney disease    8. Metabolic bone disease    9. Atrial fibrillation  with rapid ventricular response        Plan:             ESRD s/p kidney transplant and resultant CKD stage 3 with stable allograft function. No change to IS    HTN is controlled    MBD--no changes in therapy. Continue liquid calcium    Tobacco use--counseled    PreDM--discussed    Mild anemia--monitor H&H      RTC 4 months with labs prior

## 2017-07-07 ENCOUNTER — ANTI-COAG VISIT (OUTPATIENT)
Dept: CARDIOLOGY | Facility: CLINIC | Age: 62
End: 2017-07-07
Payer: COMMERCIAL

## 2017-07-07 DIAGNOSIS — Z79.01 LONG TERM (CURRENT) USE OF ANTICOAGULANTS: ICD-10-CM

## 2017-07-07 DIAGNOSIS — I48.91 ATRIAL FIBRILLATION WITH RAPID VENTRICULAR RESPONSE: ICD-10-CM

## 2017-07-07 LAB — INR PPP: 2.9 (ref 2–3)

## 2017-07-07 PROCEDURE — 85610 PROTHROMBIN TIME: CPT | Mod: QW,S$GLB,,

## 2017-07-11 RX ORDER — POTASSIUM CHLORIDE 20 MEQ/1
TABLET, EXTENDED RELEASE ORAL
Qty: 90 TABLET | Refills: 3 | Status: ON HOLD | OUTPATIENT
Start: 2017-07-11 | End: 2017-09-25

## 2017-07-16 ENCOUNTER — OFFICE VISIT (OUTPATIENT)
Dept: URGENT CARE | Facility: CLINIC | Age: 62
End: 2017-07-16
Payer: COMMERCIAL

## 2017-07-16 VITALS
TEMPERATURE: 98 F | WEIGHT: 240 LBS | HEIGHT: 73 IN | OXYGEN SATURATION: 97 % | DIASTOLIC BLOOD PRESSURE: 79 MMHG | BODY MASS INDEX: 31.81 KG/M2 | HEART RATE: 57 BPM | RESPIRATION RATE: 16 BRPM | SYSTOLIC BLOOD PRESSURE: 151 MMHG

## 2017-07-16 DIAGNOSIS — R05.9 COUGH: ICD-10-CM

## 2017-07-16 DIAGNOSIS — J40 BRONCHITIS: Primary | ICD-10-CM

## 2017-07-16 PROCEDURE — 99214 OFFICE O/P EST MOD 30 MIN: CPT | Mod: 25,S$GLB,, | Performed by: FAMILY MEDICINE

## 2017-07-16 PROCEDURE — 96372 THER/PROPH/DIAG INJ SC/IM: CPT | Mod: S$GLB,,, | Performed by: FAMILY MEDICINE

## 2017-07-16 RX ORDER — AMOXICILLIN AND CLAVULANATE POTASSIUM 875; 125 MG/1; MG/1
1 TABLET, FILM COATED ORAL 2 TIMES DAILY
Qty: 20 TABLET | Refills: 0 | Status: SHIPPED | OUTPATIENT
Start: 2017-07-16 | End: 2017-07-26

## 2017-07-16 RX ORDER — BENZONATATE 200 MG/1
200 CAPSULE ORAL 3 TIMES DAILY PRN
Qty: 30 CAPSULE | Refills: 0 | Status: SHIPPED | OUTPATIENT
Start: 2017-07-16 | End: 2017-07-26

## 2017-07-16 RX ORDER — BETAMETHASONE SODIUM PHOSPHATE AND BETAMETHASONE ACETATE 3; 3 MG/ML; MG/ML
9 INJECTION, SUSPENSION INTRA-ARTICULAR; INTRALESIONAL; INTRAMUSCULAR; SOFT TISSUE
Status: COMPLETED | OUTPATIENT
Start: 2017-07-16 | End: 2017-07-16

## 2017-07-16 RX ORDER — ALBUTEROL SULFATE 90 UG/1
2 AEROSOL, METERED RESPIRATORY (INHALATION) EVERY 4 HOURS PRN
Qty: 1 INHALER | Refills: 0 | Status: SHIPPED | OUTPATIENT
Start: 2017-07-16 | End: 2017-10-20 | Stop reason: ALTCHOICE

## 2017-07-16 RX ORDER — CEFTRIAXONE 1 G/1
1 INJECTION, POWDER, FOR SOLUTION INTRAMUSCULAR; INTRAVENOUS
Status: COMPLETED | OUTPATIENT
Start: 2017-07-16 | End: 2017-07-16

## 2017-07-16 RX ADMIN — CEFTRIAXONE 1 G: 1 INJECTION, POWDER, FOR SOLUTION INTRAMUSCULAR; INTRAVENOUS at 01:07

## 2017-07-16 RX ADMIN — BETAMETHASONE SODIUM PHOSPHATE AND BETAMETHASONE ACETATE 9 MG: 3; 3 INJECTION, SUSPENSION INTRA-ARTICULAR; INTRALESIONAL; INTRAMUSCULAR; SOFT TISSUE at 01:07

## 2017-07-16 NOTE — PROGRESS NOTES
Subjective:       Patient ID: Ibrahima Phelps is a 61 y.o. male.    Chief Complaint: Chest Congestion and Cough    Cough   This is a new problem. The current episode started yesterday. The problem has been unchanged. The problem occurs constantly. The cough is non-productive. Associated symptoms include nasal congestion, postnasal drip, shortness of breath and wheezing. Pertinent negatives include no chest pain, chills, ear pain, eye redness, fever, headaches, myalgias or sore throat. Nothing aggravates the symptoms. He has tried nothing for the symptoms. The treatment provided no relief.     Review of Systems   Constitution: Negative for chills, fever and malaise/fatigue.   HENT: Positive for congestion, hoarse voice and postnasal drip. Negative for ear pain, headaches and sore throat.    Eyes: Negative for discharge and redness.   Cardiovascular: Negative for chest pain, dyspnea on exertion and leg swelling.   Respiratory: Positive for cough, shortness of breath and wheezing. Negative for sleep disturbances due to breathing and sputum production.    Musculoskeletal: Negative for myalgias.   Gastrointestinal: Negative for abdominal pain and nausea.       Objective:      Physical Exam   Constitutional: He is oriented to person, place, and time. He appears well-developed and well-nourished. He is cooperative.  Non-toxic appearance. He does not appear ill. No distress.   HENT:   Head: Normocephalic and atraumatic.   Right Ear: Hearing, tympanic membrane, external ear and ear canal normal.   Left Ear: Hearing, tympanic membrane, external ear and ear canal normal.   Nose: Nose normal. No mucosal edema, rhinorrhea or nasal deformity. No epistaxis. Right sinus exhibits no maxillary sinus tenderness and no frontal sinus tenderness. Left sinus exhibits no maxillary sinus tenderness and no frontal sinus tenderness.   Mouth/Throat: Uvula is midline, oropharynx is clear and moist and mucous membranes are normal. No trismus in the  jaw. Normal dentition. No uvula swelling. No posterior oropharyngeal erythema.   Eyes: Conjunctivae and lids are normal. No scleral icterus.   Sclera clear bilat   Neck: Trachea normal, full passive range of motion without pain and phonation normal. Neck supple.   Cardiovascular: Normal rate, regular rhythm, normal heart sounds, intact distal pulses and normal pulses.    Pulmonary/Chest: Effort normal and breath sounds normal. No respiratory distress.   Abdominal: Soft. Normal appearance and bowel sounds are normal. He exhibits no distension. There is no tenderness.   Musculoskeletal: Normal range of motion. He exhibits no edema or deformity.   Neurological: He is alert and oriented to person, place, and time. He exhibits normal muscle tone. Coordination normal.   Skin: Skin is warm, dry and intact. He is not diaphoretic. No pallor.   Psychiatric: He has a normal mood and affect. His speech is normal and behavior is normal. Judgment and thought content normal. Cognition and memory are normal.   Nursing note and vitals reviewed.      Assessment:       1. Bronchitis    2. Cough        Plan:         Bronchitis  -     betamethasone acetate-betamethasone sodium phosphate injection 9 mg; Inject 1.5 mLs (9 mg total) into the muscle one time.  -     cefTRIAXone injection 1 g; Inject 1 g into the muscle one time.  -     amoxicillin-clavulanate 875-125mg (AUGMENTIN) 875-125 mg per tablet; Take 1 tablet by mouth 2 (two) times daily.  Dispense: 20 tablet; Refill: 0  -     benzonatate (TESSALON) 200 MG capsule; Take 1 capsule (200 mg total) by mouth 3 (three) times daily as needed for Cough.  Dispense: 30 capsule; Refill: 0  -     albuterol 90 mcg/actuation inhaler; Inhale 2 puffs into the lungs every 4 (four) hours as needed for Wheezing. Rescue  Dispense: 1 Inhaler; Refill: 0    Cough      Follow up with your doctor in a few days as needed.  Return to the urgent care or go to the ER if symptoms get worse.    Joe Magdaleno,  MD

## 2017-07-16 NOTE — PATIENT INSTRUCTIONS
What Is Acute Bronchitis?  Acute or short-term bronchitis last for days or weeks. It occurs when the bronchial tubes (airways in the lungs) are irritated by a virus, bacteria, or allergen. This causes a cough that produces yellow or greenish mucus.  Inside healthy lungs    Air travels in and out of the lungs through the airways. The linings of these airways produce sticky mucus. This mucus traps particles that enter the lungs. Tiny structures called cilia then sweep the particles out of the airways.     Healthy airway: Airways are normally open. Air moves in and out easily.      Healthy cilia: Tiny, hairlike cilia sweep mucus and particles up and out of the airways.   Lungs with bronchitis  Bronchitis often occurs with a cold or the flu virus. The airways become inflamed (red and swollen). There is a deep hacking cough from the extra mucus. Other symptoms may include:  · Wheezing  · Chest discomfort  · Shortness of breath  · Mild fever  A second infection, this time due to bacteria, may then occur. And airways irritated by allergens or smoke are more likely to get infected.        Inflamed airway: Inflammation and extra mucus narrow the airway, causing shortness of breath.      Impaired cilia: Extra mucus impairs cilia, causing congestion and wheezing. Smoking makes the problem worse.   Making a diagnosis  A physical exam, health history, and certain tests help your healthcare provider make the diagnosis.  Health history  Your healthcare provider will ask you about your symptoms.  The exam  Your provider listens to your chest for signs of congestion. He or she may also check your ears, nose, and throat.  Possible tests  · A sputum test for bacteria. This requires a sample of mucus from the lungs.  · A nasal or throat swab for bacterial infection.  · A chest X-ray if your healthcare provider thinks you have pneumonia.  · Tests to check for an underlying condition, such as allergies, asthma, or COPD. You may need  to see a specialist for more lung function testing.  Treating a cough  The main treatment for bronchitis is easing symptoms. Avoiding smoke, allergens, and other things that trigger coughing can often help. If the infection is bacterial, you may be given antibiotics. During the illness, it's important to get plenty of sleep. To ease symptoms:  · Dont smoke, and avoid secondhand smoke.  · Use a humidifier, or breathe in steam from a hot shower. This may help loosen mucus.  · Drink a lot of water and juice. They can soothe the throat and may help thin mucus.  · Sit up or use extra pillows when in bed to help lessen coughing and congestion.  · Ask your provider about using cough medicine, pain and fever medicine, or a decongestant.  Antibiotics  Most cases of bronchitis are caused by cold or flu viruses. Antibiotics dont treat viral illness. Taking antibiotics when they are not needed increases your risk of getting an infection later that is antibiotic-resistant. Your provider will prescribe antibiotics if the infection is caused by bacteria. If they are prescribed:  · Take the medicine until it is used up, even if symptoms have improved. If you dont, the bronchitis may come back.  · Take them as directed. For instance, some medicines should be taken with food.  · Ask your provider or pharmacist what side effects are common, and what to do about them.  Follow-up care  You should see your provider again in 2 to 3 weeks. By this time, symptoms should have improved. An infection that lasts longer may mean you have a more serious problem.  Prevention  · Avoid tobacco smoke. If you smoke, quit. Stay away from smoky places. Ask friends and family not to smoke around you, or in your home or car.  · Get checked for allergies.  · Ask your provider about getting a yearly flu shot, and pneumococcal or pneumonia shots.  · Wash your hands often. This helps reduce the chance of picking up viruses that cause colds and flu.  Call  your healthcare provider if:  · Symptoms worsen, or new symptoms develop.  · Breathing problems worsen or  become severe.  · Symptoms dont get better within a week, or within 3 days of taking antibiotics.   Date Last Reviewed: 6/18/2014 © 2000-2016 [employer]. [address] [place] [id number]. All rights reserved. This information is not intended as a substitute for professional medical care. Always follow your healthcare professional's instructions.      Follow up with your doctor in a few days as needed.  Return to the urgent care or go to the ER if symptoms get worse.    Joe Magdaleno MD

## 2017-07-20 RX ORDER — TACROLIMUS 1 MG/1
CAPSULE ORAL
Qty: 240 CAPSULE | Refills: 11 | Status: SHIPPED | OUTPATIENT
Start: 2017-07-20 | End: 2018-07-23 | Stop reason: SDUPTHER

## 2017-08-05 RX ORDER — PANTOPRAZOLE SODIUM 40 MG/1
TABLET, DELAYED RELEASE ORAL
Qty: 90 TABLET | Refills: 3 | Status: SHIPPED | OUTPATIENT
Start: 2017-08-05 | End: 2018-01-19

## 2017-08-18 ENCOUNTER — ANTI-COAG VISIT (OUTPATIENT)
Dept: CARDIOLOGY | Facility: CLINIC | Age: 62
End: 2017-08-18
Payer: COMMERCIAL

## 2017-08-18 DIAGNOSIS — Z79.01 LONG TERM (CURRENT) USE OF ANTICOAGULANTS: ICD-10-CM

## 2017-08-18 DIAGNOSIS — I48.91 ATRIAL FIBRILLATION WITH RAPID VENTRICULAR RESPONSE: ICD-10-CM

## 2017-08-18 LAB — INR PPP: 2.8 (ref 2–3)

## 2017-08-18 PROCEDURE — 85610 PROTHROMBIN TIME: CPT | Mod: QW,S$GLB,,

## 2017-08-19 ENCOUNTER — OFFICE VISIT (OUTPATIENT)
Dept: URGENT CARE | Facility: CLINIC | Age: 62
End: 2017-08-19
Payer: COMMERCIAL

## 2017-08-19 VITALS
HEART RATE: 71 BPM | OXYGEN SATURATION: 100 % | SYSTOLIC BLOOD PRESSURE: 137 MMHG | TEMPERATURE: 99 F | WEIGHT: 240 LBS | RESPIRATION RATE: 18 BRPM | HEIGHT: 73 IN | DIASTOLIC BLOOD PRESSURE: 77 MMHG | BODY MASS INDEX: 31.81 KG/M2

## 2017-08-19 DIAGNOSIS — M77.11 LATERAL EPICONDYLITIS OF RIGHT ELBOW: Primary | ICD-10-CM

## 2017-08-19 PROCEDURE — 99214 OFFICE O/P EST MOD 30 MIN: CPT | Mod: 25,S$GLB,, | Performed by: PHYSICIAN ASSISTANT

## 2017-08-19 PROCEDURE — 3075F SYST BP GE 130 - 139MM HG: CPT | Mod: S$GLB,,, | Performed by: PHYSICIAN ASSISTANT

## 2017-08-19 PROCEDURE — 96372 THER/PROPH/DIAG INJ SC/IM: CPT | Mod: S$GLB,,, | Performed by: PHYSICIAN ASSISTANT

## 2017-08-19 PROCEDURE — 3008F BODY MASS INDEX DOCD: CPT | Mod: S$GLB,,, | Performed by: PHYSICIAN ASSISTANT

## 2017-08-19 PROCEDURE — 3078F DIAST BP <80 MM HG: CPT | Mod: S$GLB,,, | Performed by: PHYSICIAN ASSISTANT

## 2017-08-19 RX ORDER — ACETAMINOPHEN 500 MG
1000 TABLET ORAL EVERY 6 HOURS PRN
Qty: 60 TABLET | Refills: 1 | Status: SHIPPED | OUTPATIENT
Start: 2017-08-19 | End: 2017-09-18

## 2017-08-19 RX ORDER — BETAMETHASONE SODIUM PHOSPHATE AND BETAMETHASONE ACETATE 3; 3 MG/ML; MG/ML
6 INJECTION, SUSPENSION INTRA-ARTICULAR; INTRALESIONAL; INTRAMUSCULAR; SOFT TISSUE
Status: COMPLETED | OUTPATIENT
Start: 2017-08-19 | End: 2017-08-19

## 2017-08-19 RX ADMIN — BETAMETHASONE SODIUM PHOSPHATE AND BETAMETHASONE ACETATE 6 MG: 3; 3 INJECTION, SUSPENSION INTRA-ARTICULAR; INTRALESIONAL; INTRAMUSCULAR; SOFT TISSUE at 11:08

## 2017-08-19 NOTE — PROGRESS NOTES
"Subjective:       Patient ID: Ibrahima Phelps is a 61 y.o. male.    Vitals:  height is 6' 1" (1.854 m) and weight is 108.9 kg (240 lb). His oral temperature is 98.5 °F (36.9 °C). His blood pressure is 137/77 and his pulse is 71. His respiration is 18 and oxygen saturation is 100%.     Chief Complaint: Elbow Pain    Pt states his right elbow began to hurt on Thursday, pt has  pain and slight swelling, and some swelling in his fingers as well.      Elbow Pain   This is a new problem. The current episode started in the past 7 days. The problem occurs constantly. The problem has been gradually worsening. Associated symptoms include joint swelling. Pertinent negatives include no abdominal pain, chest pain, chills, fever, headaches, nausea, rash, sore throat or vomiting. Nothing aggravates the symptoms. He has tried heat for the symptoms. The treatment provided no relief.     Review of Systems   Constitution: Negative for chills and fever.   HENT: Negative for headaches and sore throat.    Eyes: Negative for blurred vision.   Cardiovascular: Negative for chest pain.   Respiratory: Negative for shortness of breath.    Skin: Negative for rash.   Musculoskeletal: Positive for joint pain and joint swelling. Negative for back pain.   Gastrointestinal: Negative for abdominal pain, diarrhea, nausea and vomiting.   Psychiatric/Behavioral: The patient is not nervous/anxious.        Objective:      Physical Exam   Constitutional: He is oriented to person, place, and time. He appears well-developed and well-nourished. He is cooperative.  Non-toxic appearance. He does not appear ill. No distress.   HENT:   Head: Normocephalic and atraumatic. Head is without abrasion, without contusion and without laceration.   Right Ear: Hearing, tympanic membrane, external ear and ear canal normal. No hemotympanum.   Left Ear: Hearing, tympanic membrane, external ear and ear canal normal. No hemotympanum.   Nose: Nose normal. No mucosal edema, " rhinorrhea or nasal deformity. No epistaxis. Right sinus exhibits no maxillary sinus tenderness and no frontal sinus tenderness. Left sinus exhibits no maxillary sinus tenderness and no frontal sinus tenderness.   Mouth/Throat: Uvula is midline, oropharynx is clear and moist and mucous membranes are normal. No trismus in the jaw. Normal dentition. No uvula swelling. No posterior oropharyngeal erythema.   Eyes: Conjunctivae, EOM and lids are normal. Pupils are equal, round, and reactive to light. Right eye exhibits no discharge. Left eye exhibits no discharge. No scleral icterus.   Sclera clear bilat   Neck: Trachea normal, normal range of motion, full passive range of motion without pain and phonation normal. Neck supple. No spinous process tenderness and no muscular tenderness present. No neck rigidity. No tracheal deviation present.   Cardiovascular: Normal rate, regular rhythm, normal heart sounds, intact distal pulses and normal pulses.    Pulmonary/Chest: Effort normal and breath sounds normal. No respiratory distress.   Abdominal: Soft. Normal appearance and bowel sounds are normal. He exhibits no distension, no pulsatile midline mass and no mass. There is no tenderness.   Musculoskeletal: Normal range of motion. He exhibits no edema or deformity.        Left forearm: He exhibits tenderness and bony tenderness. He exhibits no swelling, no edema, no deformity and no laceration.        Arms:  Neurological: He is alert and oriented to person, place, and time. He has normal strength. No cranial nerve deficit or sensory deficit. He exhibits normal muscle tone. He displays no seizure activity. Coordination normal. GCS eye subscore is 4. GCS verbal subscore is 5. GCS motor subscore is 6.   Skin: Skin is warm, dry and intact. Capillary refill takes less than 2 seconds. No abrasion, no bruising, no burn, no ecchymosis and no laceration noted. He is not diaphoretic. No pallor.   Psychiatric: He has a normal mood and  affect. His speech is normal and behavior is normal. Judgment and thought content normal. Cognition and memory are normal.   Nursing note and vitals reviewed.      Assessment:       1. Lateral epicondylitis of right elbow        Plan:         Lateral epicondylitis of right elbow  -     betamethasone acetate-betamethasone sodium phosphate injection 6 mg; Inject 1 mL (6 mg total) into the muscle one time.  -     acetaminophen (TYLENOL EXTRA STRENGTH) 500 MG tablet; Take 2 tablets (1,000 mg total) by mouth every 6 (six) hours as needed for Pain.  Dispense: 60 tablet; Refill: 1

## 2017-08-19 NOTE — PATIENT INSTRUCTIONS
Understanding Lateral Epicondylitis    Tendons are strong bands of tissue that connect muscles to bones. Lateral epicondylitis affects the tendons that connect muscles in the forearm to the lateral epicondyle. This is the bony knob on the outer side of the elbow. The condition occurs if the extensor tendons of the wrist become red and swollen (irritated). This can cause pain in the elbow, forearm, and wrist. Because the condition is sometimes caused by playing tennis, it is also known as tennis elbow.  How to say it  LA-tuhr-kayleigh [id number]-[id number]-LY-[id number]   What causes lateral epicondylitis?  The condition most often occurs because of overuse. This can be from any activity that repeatedly puts stress on the forearm extensor muscles or tendons and wrist. For instance, playing tennis, lifting weights, cutting meat, painting, and typing can all cause the condition. Wear and tear of the tendons from aging or an injury to the tendons can also cause the condition.  Symptoms of lateral epicondylitis  The most common symptom is pain. You may feel it on the outer side of the elbow and down the back of the forearm. It may be worse when moving or using the elbow, forearm, or wrist. You may also feel pain when gripping or lifting things.  Treatment for lateral epicondylitis  Treatments may include:  · Resting the elbow, forearm, and wrist. Youll need to avoid movements that can make your symptoms worse. You also may need to avoid certain sports and types of work for a time. This helps relieve symptoms and prevent further damage to the tendons.  · Changing the action that caused the problem. For instance, if the tendons were damaged from playing tennis, it may help to change your playing technique or use different equipment. This helps prevent further damage to the tendons.  · Using cold packs. Putting an ice pack on the injured area can help reduce pain and swelling.  · Taking pain medicines. Taking prescription or  over-the-counter pain medicines may help reduce pain and swelling.    · Wearing a brace. This helps reduce strain on the muscles and tendons in the forearm, which may relieve symptoms. It is very important to wear the brace properly.  · Doing exercises and physical therapy. These help improve strength and range of motion in the elbow, forearm, and wrist.  · Getting shots of medicine into the injured area. These may help relieve symptoms for a time.  · Having surgery. This may be an option if other treatments fail to relieve symptoms. In many cases, the surgeon removes the damaged tissue.  Possible complications of lateral epicondylitis  If the tendons involved dont heal properly, symptoms may return or get worse. To help prevent this, follow your treatment plan as directed.  When to call your healthcare provider  Call your healthcare provider right away if you have any of these:  · Fever of 100.4°F (38°C) or higher, or as directed  · Redness, swelling, or warmth in the elbow or forearm that gets worse  · Symptoms that dont get better with treatment, or get worse  · New symptoms   Date Last Reviewed: 3/10/2016  © 2860-0638 [employer]. [address] [place] [address]. All rights reserved. This information is not intended as a substitute for professional medical care. Always follow your healthcare professional's instructions.        Treating Tennis Elbow    Your treatment will depend on how inflamed your tendon is. The goal is to relieve your symptoms and help you regain full use of your elbow.  Rest and medicine  Wearing a tennis elbow splint allows the inflamed tendon to rest. It must be worn properly. It should be placed down the arm past the painful area of the elbow. If it is directly over the inflamed tendon, it can worsen the symptoms. This brace can help the tendon heal. Using your other hand or changing your  also takes stress off the tendon. Oral  nonsteroidal anti-inflammatory medicines (NSAIDs) and/or ice can relieve pain and reduce swelling.  Exercises and therapy  Your healthcare provider may give you an exercise program. He or she may refer you to a therapist. The therapist will teach you to gently stretch and then strengthen the muscles around your elbow.  Anti-inflammatory injections  Your healthcare provider may give you injections of an anti-inflammatory, such as cortisone. This helps reduce swelling. You may have more pain at first. But in a few days, your elbow should feel better.  If surgery is needed  If your symptoms persist for a long time, or other treatments dont work, your healthcare provider may recommend surgery. Surgery repairs the inflamed tendon.   Date Last Reviewed: 9/26/2015  © 7841-1122 [employer]. [address] [place] [address], [place] [address]. All rights reserved. This information is not intended as a substitute for professional medical care. Always follow your healthcare professional's instructions.      Please follow up with your Primary care provider within 2-5 days if your signs ans symptoms have not resolved or worsen.     If your condition worsens or fails to improve we recommend that you receive another evaluation at the emergency room immediately or contact your primary medical clinic to discuss your concerns.   You must understand that you have received an Urgent Care treatment only and that you may be released before all of your medical problems are known or treated. You, the patient, will arrange for follow up care as instructed.

## 2017-09-08 DIAGNOSIS — Z79.01 LONG-TERM (CURRENT) USE OF ANTICOAGULANTS: ICD-10-CM

## 2017-09-08 DIAGNOSIS — I48.91 ATRIAL FIBRILLATION WITH RAPID VENTRICULAR RESPONSE: ICD-10-CM

## 2017-09-08 RX ORDER — WARFARIN SODIUM 5 MG/1
TABLET ORAL
Qty: 96 TABLET | Refills: 2 | Status: SHIPPED | OUTPATIENT
Start: 2017-09-08 | End: 2018-05-07

## 2017-09-24 ENCOUNTER — HOSPITAL ENCOUNTER (OUTPATIENT)
Facility: HOSPITAL | Age: 62
Discharge: HOME | End: 2017-09-25
Attending: EMERGENCY MEDICINE | Admitting: HOSPITALIST
Payer: COMMERCIAL

## 2017-09-24 DIAGNOSIS — R06.02 SOB (SHORTNESS OF BREATH): Primary | ICD-10-CM

## 2017-09-24 DIAGNOSIS — I25.10 CORONARY ARTERY DISEASE INVOLVING NATIVE CORONARY ARTERY OF NATIVE HEART WITHOUT ANGINA PECTORIS: ICD-10-CM

## 2017-09-24 DIAGNOSIS — R07.9 CHEST PAIN: ICD-10-CM

## 2017-09-24 LAB
ALBUMIN SERPL BCP-MCNC: 2.8 G/DL
ALBUMIN SERPL BCP-MCNC: 2.8 G/DL
ALP SERPL-CCNC: 78 U/L
ALP SERPL-CCNC: 78 U/L
ALT SERPL W/O P-5'-P-CCNC: 12 U/L
ALT SERPL W/O P-5'-P-CCNC: 12 U/L
ANION GAP SERPL CALC-SCNC: 10 MMOL/L
ANION GAP SERPL CALC-SCNC: 10 MMOL/L
AST SERPL-CCNC: 18 U/L
AST SERPL-CCNC: 18 U/L
BASOPHILS # BLD AUTO: 0.01 K/UL
BASOPHILS NFR BLD: 0.1 %
BILIRUB SERPL-MCNC: 0.3 MG/DL
BILIRUB SERPL-MCNC: 0.3 MG/DL
BNP SERPL-MCNC: 221 PG/ML
BNP SERPL-MCNC: 221 PG/ML
BUN SERPL-MCNC: 17 MG/DL
BUN SERPL-MCNC: 17 MG/DL
CALCIUM SERPL-MCNC: 6.6 MG/DL
CALCIUM SERPL-MCNC: 6.6 MG/DL
CHLORIDE SERPL-SCNC: 108 MMOL/L
CHLORIDE SERPL-SCNC: 108 MMOL/L
CO2 SERPL-SCNC: 25 MMOL/L
CO2 SERPL-SCNC: 25 MMOL/L
CREAT SERPL-MCNC: 1.8 MG/DL
CREAT SERPL-MCNC: 1.8 MG/DL
DIFFERENTIAL METHOD: ABNORMAL
EOSINOPHIL # BLD AUTO: 0.3 K/UL
EOSINOPHIL NFR BLD: 3.2 %
ERYTHROCYTE [DISTWIDTH] IN BLOOD BY AUTOMATED COUNT: 14.3 %
EST. GFR  (AFRICAN AMERICAN): 45.6 ML/MIN/1.73 M^2
EST. GFR  (AFRICAN AMERICAN): 45.6 ML/MIN/1.73 M^2
EST. GFR  (NON AFRICAN AMERICAN): 39.5 ML/MIN/1.73 M^2
EST. GFR  (NON AFRICAN AMERICAN): 39.5 ML/MIN/1.73 M^2
GLUCOSE SERPL-MCNC: 106 MG/DL
GLUCOSE SERPL-MCNC: 106 MG/DL
HCT VFR BLD AUTO: 35.2 %
HGB BLD-MCNC: 11.3 G/DL
LYMPHOCYTES # BLD AUTO: 2 K/UL
LYMPHOCYTES NFR BLD: 19.8 %
MCH RBC QN AUTO: 25.8 PG
MCHC RBC AUTO-ENTMCNC: 32.1 G/DL
MCV RBC AUTO: 80 FL
MONOCYTES # BLD AUTO: 0.8 K/UL
MONOCYTES NFR BLD: 7.3 %
NEUTROPHILS # BLD AUTO: 7.1 K/UL
NEUTROPHILS NFR BLD: 69.3 %
PLATELET # BLD AUTO: 187 K/UL
PMV BLD AUTO: 11.1 FL
POTASSIUM SERPL-SCNC: 3.7 MMOL/L
POTASSIUM SERPL-SCNC: 3.7 MMOL/L
PROT SERPL-MCNC: 6.2 G/DL
PROT SERPL-MCNC: 6.2 G/DL
RBC # BLD AUTO: 4.38 M/UL
SODIUM SERPL-SCNC: 143 MMOL/L
SODIUM SERPL-SCNC: 143 MMOL/L
TROPONIN I SERPL DL<=0.01 NG/ML-MCNC: 0.02 NG/ML
WBC # BLD AUTO: 10.24 K/UL

## 2017-09-24 PROCEDURE — 80053 COMPREHEN METABOLIC PANEL: CPT

## 2017-09-24 PROCEDURE — 99282 EMERGENCY DEPT VISIT SF MDM: CPT | Mod: ,,, | Performed by: EMERGENCY MEDICINE

## 2017-09-24 PROCEDURE — 85025 COMPLETE CBC W/AUTO DIFF WBC: CPT

## 2017-09-24 PROCEDURE — 83880 ASSAY OF NATRIURETIC PEPTIDE: CPT

## 2017-09-24 PROCEDURE — 93010 ELECTROCARDIOGRAM REPORT: CPT | Mod: ,,, | Performed by: INTERNAL MEDICINE

## 2017-09-24 PROCEDURE — 93005 ELECTROCARDIOGRAM TRACING: CPT

## 2017-09-24 PROCEDURE — 25000003 PHARM REV CODE 250: Performed by: STUDENT IN AN ORGANIZED HEALTH CARE EDUCATION/TRAINING PROGRAM

## 2017-09-24 PROCEDURE — 84484 ASSAY OF TROPONIN QUANT: CPT

## 2017-09-24 PROCEDURE — 99284 EMERGENCY DEPT VISIT MOD MDM: CPT | Mod: 25

## 2017-09-24 PROCEDURE — 85610 PROTHROMBIN TIME: CPT

## 2017-09-24 RX ORDER — ASPIRIN 325 MG
325 TABLET ORAL
Status: COMPLETED | OUTPATIENT
Start: 2017-09-24 | End: 2017-09-24

## 2017-09-24 RX ADMIN — ASPIRIN 325 MG ORAL TABLET 325 MG: 325 PILL ORAL at 11:09

## 2017-09-25 VITALS
OXYGEN SATURATION: 98 % | WEIGHT: 237 LBS | HEIGHT: 73 IN | BODY MASS INDEX: 31.41 KG/M2 | DIASTOLIC BLOOD PRESSURE: 90 MMHG | HEART RATE: 56 BPM | RESPIRATION RATE: 20 BRPM | TEMPERATURE: 99 F | SYSTOLIC BLOOD PRESSURE: 154 MMHG

## 2017-09-25 PROBLEM — I50.30 (HFPEF) HEART FAILURE WITH PRESERVED EJECTION FRACTION: Status: ACTIVE | Noted: 2017-09-25

## 2017-09-25 PROBLEM — Z86.79 HISTORY OF PERICARDITIS: Status: ACTIVE | Noted: 2017-01-23

## 2017-09-25 PROBLEM — S46.811A STRAIN OF RIGHT TRAPEZIUS MUSCLE: Status: ACTIVE | Noted: 2017-09-25

## 2017-09-25 PROBLEM — I48.91 ATRIAL FIBRILLATION: Status: ACTIVE | Noted: 2017-09-25

## 2017-09-25 LAB
ANION GAP SERPL CALC-SCNC: 10 MMOL/L
BUN SERPL-MCNC: 17 MG/DL
CALCIUM SERPL-MCNC: 6.6 MG/DL
CHLORIDE SERPL-SCNC: 111 MMOL/L
CO2 SERPL-SCNC: 22 MMOL/L
CREAT SERPL-MCNC: 1.6 MG/DL
CRP SERPL-MCNC: 19.7 MG/L
EST. GFR  (AFRICAN AMERICAN): 52.6 ML/MIN/1.73 M^2
EST. GFR  (NON AFRICAN AMERICAN): 45.5 ML/MIN/1.73 M^2
GLUCOSE SERPL-MCNC: 86 MG/DL
INR PPP: 2.2
INR PPP: 2.2
MAGNESIUM SERPL-MCNC: 1.7 MG/DL
POTASSIUM SERPL-SCNC: 3.6 MMOL/L
PROTHROMBIN TIME: 21.9 SEC
PROTHROMBIN TIME: 22 SEC
SODIUM SERPL-SCNC: 143 MMOL/L
TROPONIN I SERPL DL<=0.01 NG/ML-MCNC: 0.02 NG/ML

## 2017-09-25 PROCEDURE — 84484 ASSAY OF TROPONIN QUANT: CPT

## 2017-09-25 PROCEDURE — S4991 NICOTINE PATCH NONLEGEND: HCPCS | Performed by: PHYSICIAN ASSISTANT

## 2017-09-25 PROCEDURE — 93306 TTE W/DOPPLER COMPLETE: CPT | Mod: 26,,, | Performed by: INTERNAL MEDICINE

## 2017-09-25 PROCEDURE — 86140 C-REACTIVE PROTEIN: CPT

## 2017-09-25 PROCEDURE — 83735 ASSAY OF MAGNESIUM: CPT

## 2017-09-25 PROCEDURE — 63600175 PHARM REV CODE 636 W HCPCS: Performed by: PHYSICIAN ASSISTANT

## 2017-09-25 PROCEDURE — 99220 PR INITIAL OBSERVATION CARE,LEVL III: CPT | Mod: ,,, | Performed by: PHYSICIAN ASSISTANT

## 2017-09-25 PROCEDURE — G0378 HOSPITAL OBSERVATION PER HR: HCPCS

## 2017-09-25 PROCEDURE — 93306 TTE W/DOPPLER COMPLETE: CPT

## 2017-09-25 PROCEDURE — 25000003 PHARM REV CODE 250: Performed by: PHYSICIAN ASSISTANT

## 2017-09-25 PROCEDURE — 80048 BASIC METABOLIC PNL TOTAL CA: CPT

## 2017-09-25 PROCEDURE — 36415 COLL VENOUS BLD VENIPUNCTURE: CPT

## 2017-09-25 PROCEDURE — 85610 PROTHROMBIN TIME: CPT

## 2017-09-25 RX ORDER — IBUPROFEN 200 MG
24 TABLET ORAL
Status: DISCONTINUED | OUTPATIENT
Start: 2017-09-25 | End: 2017-09-25 | Stop reason: HOSPADM

## 2017-09-25 RX ORDER — POTASSIUM CHLORIDE 20 MEQ/1
20 TABLET, EXTENDED RELEASE ORAL DAILY
Status: DISCONTINUED | OUTPATIENT
Start: 2017-09-25 | End: 2017-09-25 | Stop reason: HOSPADM

## 2017-09-25 RX ORDER — DOXAZOSIN 4 MG/1
4 TABLET ORAL EVERY 12 HOURS
Status: DISCONTINUED | OUTPATIENT
Start: 2017-09-25 | End: 2017-09-25 | Stop reason: HOSPADM

## 2017-09-25 RX ORDER — ONDANSETRON 2 MG/ML
4 INJECTION INTRAMUSCULAR; INTRAVENOUS EVERY 12 HOURS PRN
Status: DISCONTINUED | OUTPATIENT
Start: 2017-09-25 | End: 2017-09-25 | Stop reason: HOSPADM

## 2017-09-25 RX ORDER — NIFEDIPINE 60 MG/1
60 TABLET, EXTENDED RELEASE ORAL DAILY
Status: DISCONTINUED | OUTPATIENT
Start: 2017-09-25 | End: 2017-09-25 | Stop reason: HOSPADM

## 2017-09-25 RX ORDER — GLUCAGON 1 MG
1 KIT INJECTION
Status: DISCONTINUED | OUTPATIENT
Start: 2017-09-25 | End: 2017-09-25 | Stop reason: HOSPADM

## 2017-09-25 RX ORDER — HYDROCODONE BITARTRATE AND ACETAMINOPHEN 5; 325 MG/1; MG/1
1 TABLET ORAL EVERY 4 HOURS PRN
Status: DISCONTINUED | OUTPATIENT
Start: 2017-09-25 | End: 2017-09-25 | Stop reason: HOSPADM

## 2017-09-25 RX ORDER — LIDOCAINE 50 MG/G
1 PATCH TOPICAL
Status: DISCONTINUED | OUTPATIENT
Start: 2017-09-25 | End: 2017-09-25 | Stop reason: HOSPADM

## 2017-09-25 RX ORDER — ATORVASTATIN CALCIUM 10 MG/1
10 TABLET, FILM COATED ORAL DAILY
Status: DISCONTINUED | OUTPATIENT
Start: 2017-09-25 | End: 2017-09-25 | Stop reason: HOSPADM

## 2017-09-25 RX ORDER — ACETAMINOPHEN 325 MG/1
650 TABLET ORAL EVERY 4 HOURS PRN
Status: DISCONTINUED | OUTPATIENT
Start: 2017-09-25 | End: 2017-09-25 | Stop reason: HOSPADM

## 2017-09-25 RX ORDER — CYCLOBENZAPRINE HCL 5 MG
5 TABLET ORAL 3 TIMES DAILY PRN
Status: DISCONTINUED | OUTPATIENT
Start: 2017-09-25 | End: 2017-09-25 | Stop reason: HOSPADM

## 2017-09-25 RX ORDER — ONDANSETRON 4 MG/1
8 TABLET, ORALLY DISINTEGRATING ORAL EVERY 8 HOURS PRN
Status: DISCONTINUED | OUTPATIENT
Start: 2017-09-25 | End: 2017-09-25 | Stop reason: HOSPADM

## 2017-09-25 RX ORDER — FUROSEMIDE 20 MG/1
20 TABLET ORAL 2 TIMES DAILY
Status: DISCONTINUED | OUTPATIENT
Start: 2017-09-25 | End: 2017-09-25 | Stop reason: HOSPADM

## 2017-09-25 RX ORDER — AMOXICILLIN 250 MG
1 CAPSULE ORAL 2 TIMES DAILY
Status: DISCONTINUED | OUTPATIENT
Start: 2017-09-25 | End: 2017-09-25 | Stop reason: HOSPADM

## 2017-09-25 RX ORDER — IPRATROPIUM BROMIDE AND ALBUTEROL SULFATE 2.5; .5 MG/3ML; MG/3ML
3 SOLUTION RESPIRATORY (INHALATION) EVERY 4 HOURS PRN
Status: DISCONTINUED | OUTPATIENT
Start: 2017-09-25 | End: 2017-09-25 | Stop reason: HOSPADM

## 2017-09-25 RX ORDER — IBUPROFEN 200 MG
16 TABLET ORAL
Status: DISCONTINUED | OUTPATIENT
Start: 2017-09-25 | End: 2017-09-25 | Stop reason: HOSPADM

## 2017-09-25 RX ORDER — IBUPROFEN 200 MG
1 TABLET ORAL DAILY
Status: DISCONTINUED | OUTPATIENT
Start: 2017-09-25 | End: 2017-09-25 | Stop reason: HOSPADM

## 2017-09-25 RX ORDER — PREDNISONE 5 MG/1
5 TABLET ORAL EVERY MORNING
Status: DISCONTINUED | OUTPATIENT
Start: 2017-09-25 | End: 2017-09-25 | Stop reason: HOSPADM

## 2017-09-25 RX ORDER — ASPIRIN 81 MG/1
81 TABLET ORAL DAILY
Status: DISCONTINUED | OUTPATIENT
Start: 2017-09-25 | End: 2017-09-25 | Stop reason: HOSPADM

## 2017-09-25 RX ORDER — METOPROLOL TARTRATE 50 MG/1
50 TABLET ORAL 2 TIMES DAILY
Status: DISCONTINUED | OUTPATIENT
Start: 2017-09-25 | End: 2017-09-25 | Stop reason: HOSPADM

## 2017-09-25 RX ORDER — PARICALCITOL 1 UG/1
1 CAPSULE, LIQUID FILLED ORAL
Status: DISCONTINUED | OUTPATIENT
Start: 2017-09-25 | End: 2017-09-25 | Stop reason: HOSPADM

## 2017-09-25 RX ORDER — SPIRONOLACTONE 25 MG/1
25 TABLET ORAL DAILY
Status: DISCONTINUED | OUTPATIENT
Start: 2017-09-25 | End: 2017-09-25 | Stop reason: HOSPADM

## 2017-09-25 RX ORDER — CALCIUM CARBONATE 1250 MG/5ML
1000 SUSPENSION ORAL 2 TIMES DAILY
Status: DISCONTINUED | OUTPATIENT
Start: 2017-09-25 | End: 2017-09-25 | Stop reason: HOSPADM

## 2017-09-25 RX ORDER — TACROLIMUS 1 MG/1
4 CAPSULE ORAL EVERY MORNING
Status: DISCONTINUED | OUTPATIENT
Start: 2017-09-25 | End: 2017-09-25 | Stop reason: HOSPADM

## 2017-09-25 RX ORDER — RAMELTEON 8 MG/1
8 TABLET ORAL NIGHTLY PRN
Status: DISCONTINUED | OUTPATIENT
Start: 2017-09-25 | End: 2017-09-25 | Stop reason: HOSPADM

## 2017-09-25 RX ORDER — MYCOPHENOLATE MOFETIL 250 MG/1
500 CAPSULE ORAL 2 TIMES DAILY
Status: DISCONTINUED | OUTPATIENT
Start: 2017-09-25 | End: 2017-09-25 | Stop reason: HOSPADM

## 2017-09-25 RX ORDER — WARFARIN SODIUM 5 MG/1
5 TABLET ORAL DAILY
Status: DISCONTINUED | OUTPATIENT
Start: 2017-09-25 | End: 2017-09-25 | Stop reason: HOSPADM

## 2017-09-25 RX ORDER — PANTOPRAZOLE SODIUM 40 MG/1
40 TABLET, DELAYED RELEASE ORAL DAILY
Status: DISCONTINUED | OUTPATIENT
Start: 2017-09-25 | End: 2017-09-25 | Stop reason: HOSPADM

## 2017-09-25 RX ORDER — HYDRALAZINE HYDROCHLORIDE 25 MG/1
25 TABLET, FILM COATED ORAL EVERY 8 HOURS PRN
Status: DISCONTINUED | OUTPATIENT
Start: 2017-09-25 | End: 2017-09-25 | Stop reason: HOSPADM

## 2017-09-25 RX ADMIN — NICOTINE 1 PATCH: 14 PATCH, EXTENDED RELEASE TRANSDERMAL at 09:09

## 2017-09-25 RX ADMIN — ASPIRIN 81 MG: 81 TABLET, COATED ORAL at 09:09

## 2017-09-25 RX ADMIN — STANDARDIZED SENNA CONCENTRATE AND DOCUSATE SODIUM 1 TABLET: 8.6; 5 TABLET, FILM COATED ORAL at 09:09

## 2017-09-25 RX ADMIN — METOPROLOL TARTRATE 50 MG: 50 TABLET, FILM COATED ORAL at 09:09

## 2017-09-25 RX ADMIN — PARICALCITOL 1 MCG: 1 CAPSULE, LIQUID FILLED ORAL at 09:09

## 2017-09-25 RX ADMIN — PANTOPRAZOLE SODIUM 40 MG: 40 TABLET, DELAYED RELEASE ORAL at 09:09

## 2017-09-25 RX ADMIN — TACROLIMUS 4 MG: 1 CAPSULE ORAL at 09:09

## 2017-09-25 RX ADMIN — CALCIUM CARBONATE 1000 MG: 1250 SUSPENSION ORAL at 10:09

## 2017-09-25 RX ADMIN — ATORVASTATIN CALCIUM 10 MG: 10 TABLET, FILM COATED ORAL at 09:09

## 2017-09-25 RX ADMIN — PREDNISONE 5 MG: 5 TABLET ORAL at 06:09

## 2017-09-25 RX ADMIN — DOXAZOSIN 4 MG: 4 TABLET ORAL at 09:09

## 2017-09-25 RX ADMIN — FUROSEMIDE 20 MG: 20 TABLET ORAL at 09:09

## 2017-09-25 RX ADMIN — NIFEDIPINE 60 MG: 60 TABLET, FILM COATED, EXTENDED RELEASE ORAL at 09:09

## 2017-09-25 RX ADMIN — POTASSIUM CHLORIDE 20 MEQ: 1500 TABLET, EXTENDED RELEASE ORAL at 09:09

## 2017-09-25 RX ADMIN — SPIRONOLACTONE 25 MG: 25 TABLET, FILM COATED ORAL at 09:09

## 2017-09-25 RX ADMIN — MYCOPHENOLATE MOFETIL 500 MG: 250 CAPSULE ORAL at 09:09

## 2017-09-25 RX ADMIN — LIDOCAINE 1 PATCH: 50 PATCH TOPICAL at 02:09

## 2017-09-25 NOTE — PROGRESS NOTES
D/c instructions reviewed and given. Pt verbalized understanding. PIV removed with catheter intact. Pt waiting at bedside for hosp transport

## 2017-09-25 NOTE — ASSESSMENT & PLAN NOTE
- uncontrolled on admit, max 193/94  - continue home medications: doxazosin 4 mg BID, lasix 20 mg BID, lopressor 50 mg BID, nifedipine 60 mg BID, spironolactone 25 mg daily, add PRN hydralazine  - titrate as necessary

## 2017-09-25 NOTE — PLAN OF CARE
Date: 09/25/2017      Patient Name: Ibrahima Phelps  YOB: 1955  [address] [facility] [address]          Hospital Medicine Dept.  [facility] [address] [facility] [address] [phone] after hours  [phone] fax Ibrahima Phelps has been hospitalized at the [facility] since 9/24/2017.  Please excuse the patient from duties during this past hospitalization.  Patient may return on 9/26/17 with no restrictions.     Please contact me if you have any questions.      __________________________  Nadira Walters MD  09/25/2017

## 2017-09-25 NOTE — ASSESSMENT & PLAN NOTE
- reports similar symptoms when diagnosed with pericarditis and when in afib with RVR  - EKG: Sinus rhythm with occasional Premature ventricular complexes, Nonspecific T wave abnormality, no ST changes  - 100% on RA, no distress  - trend trops 0.020> , pending repeat echo, tele  - current daily smoker: no wheeze, no crackles, good air movement, CTAB  -  and last trip on 09/22: INR therapeutic, but can continue to consider PE  - does not appear in decompensated HF, CXR clear  - check CRP, previously elevated when diagnosed with pericarditis

## 2017-09-25 NOTE — DISCHARGE SUMMARY
DISCHARGE SUMMARY  HOSPITAL MEDICINE    Team: [facility]OSP MED O    Patient Name: Ibrahima Phelps  YOB: 1955    Admit Date: 9/24/2017    Discharge Date: 09/25/2017    LOS: 0    Discharge Attending Physician: Nadira Walters MD     Diagnoses:  Active Hospital Problems    Diagnosis  POA    *DAUGHERTY (dyspnea on exertion) [R06.09]  Yes    (HFpEF) heart failure with preserved ejection fraction [I50.30]  Yes    Strain of right trapezius muscle [S46.811A]  Yes    Atrial fibrillation [I48.91]  Yes    Chronic obstructive pulmonary disease [J44.9]  Yes    Essential hypertension [I10]  Yes    History of pericarditis [Z86.79]  Not Applicable    H/O kidney transplant [Z94.0]  Not Applicable    Long term (current) use of anticoagulants [V58.61] [Z79.01]  Not Applicable    Tobacco use [Z72.0]  Yes    CAD (coronary artery disease) [I25.10]  Yes     Negative stress echo 2009  ? Hx of pericarditis      Hyperlipidemia [E78.5]  Yes    Immunodeficiency due to treatment with immunosuppressive medication [Z79.899]  Not Applicable      Resolved Hospital Problems    Diagnosis Date Resolved POA   No resolved problems to display.       Discharged Condition: Stable    Hospital Course:    Initial Presentation:  Ibrahima Phelps is a 61M with PMHx of HTN, HLD, CAD s/p DIEGO 2006, pericarditis, afib on coumadin, s/p kidney transplant 2005, current tobacco user, COPD who presents for evaluation of acute onset DAUGHERTY associated with neck/scapular/back pain. Symptoms started AM of 09/24, improved with rest, worse when ambulatory. He describes his chest pain as a soreness and worsened with deep inspiration. He reports that his symptoms are similar to when he had pericarditis 01/2017 and about 2 years ago. He reports DAUGHERTY when ambulating around his house, which is new. He has not tried any of his home inhaler nor any pain medication for his back pain. He continues to smoke 4-5 cigarettes a day and he works as a  and his last trip  was on Friday. He denies any trauma to his neck/back or recent exercise.     On admit, trop negative, EKG in NSR without ischemic changes, pain improved s/p ASA, CXR clear.    Course of Principle Problem for Admission:  Dyspnea on exertion  - Reports similar symptoms when diagnosed with pericarditis and when in afib with RVR.  CRP 19, but not as elevated as with previous pericarditis  - EKG: Sinus rhythm with occasional Premature ventricular complexes, Nonspecific T wave abnormality, no ST changes  - Satting 100% on RA  - Troponin remained flat and negative  - 2D echo with no acute pathology  - Wells Score 1.5, low probability of PE and therapeutic on Warfarin  - At the time of discharge, patient says he felt better, although he was not sure why.  He is requesting to go home today.    Other Medical Problems Addressed in the Hospital:  (HFpEF) heart failure with preserved ejection fraction     - last echo 01/2017: EF 55%, +DD  - CXR without edema, BNP near baseline, euvolemic   - repeat echo normal       Chronic obstructive pulmonary disease     - duonebs, not on daily medications  - no wheeze on exam  - tobacco counseling, current smoker       History of pericarditis     - last episode 01/2017, see d/c summary       Atrial fibrillation     - rate controlled, NSR on admit  - continue coumadin and rate meds       Strain of right trapezius muscle     - supportive care       Essential hypertension     - uncontrolled on admit, max 193/94  - continue home medications: doxazosin 4 mg BID, lasix 20 mg BID, lopressor 50 mg BID, nifedipine 60 mg BID, spironolactone 25 mg daily, add PRN hydralazine       H/O kidney transplant     - continue prograf, mycophenolate, prednisone  - renal function at baseline       CAD (coronary artery disease)     - continue asa, statin  - reports DAUGHERTY, trops negative, EKG without ischemic changes         Constitutional: He is oriented to person, place, and time. He appears well-developed and  well-nourished. No distress.   HENT:   Head: Normocephalic and atraumatic.   Eyes: EOM are normal. Pupils are equal, round, and reactive to light.   Neck: Trachea normal, normal range of motion and full passive range of motion without pain. Neck supple. Muscular tenderness (R trap TTP) present.   Cardiovascular: Normal rate. Regular rhythm.  No murmur heard.  Pulmonary/Chest: Effort normal and breath sounds normal. No accessory muscle usage. No tachypnea. No respiratory distress. He has no decreased breath sounds. He has no wheezes. He has no rales.   No chest pain when asked to lean forward   Abdominal: Soft. Bowel sounds are normal. He exhibits no distension. There is no tenderness.   obese   Musculoskeletal: Normal range of motion. He exhibits no edema.   Neurological: He is alert and oriented to person, place, and time. He has normal strength. He displays no tremor.   Skin: He is not diaphoretic. No rashes.  Psychiatric: His behavior is normal. Judgment and thought content normal.   Nursing note and vitals reviewed.    Disposition:  Home    Future Scheduled Appointments:  Future Appointments  Date Time Provider Department Center   10/2/2017 8:30 AM Albania Tee, PharmD [facility] COUMAGNES [facility]   10/2/2017 9:00 AM LAB, SAME DAY [facility]   10/2/2017 11:40 AM Emre Latif MD [facility] NEPHRO [place]       Follow-up Plans from This Hospitalization:  PCP in 1 week    Discharge Medication List:  Reconciled Home Medications:   Discharge Medication List as of 9/25/2017 11:14 AM      CONTINUE these medications which have NOT CHANGED    Details   aspirin (ADULT ASPIRIN EC LOW STRENGTH) 81 MG EC tablet Take 1 tablet by mouth Daily., Starting 6/11/2012, Until Discontinued, Historical Med      atorvastatin (LIPITOR) 10 MG tablet Take 10 mg by mouth once daily., Until Discontinued, Historical Med      doxazosin (CARDURA) 4 MG tablet Take 4 mg by mouth every 12 (twelve) hours. Patient takine one tablet twice  daily, Until Discontinued, Historical Med      furosemide (LASIX) 40 MG tablet Take 0.5 tablets (20 mg total) by mouth 2 (two) times daily., Starting 11/14/2016, Until Discontinued, Normal      metoprolol tartrate (LOPRESSOR) 50 MG tablet Take 1 tablet (50 mg total) by mouth 2 (two) times daily., Starting 12/14/2016, Until Discontinued, Normal      mycophenolate (CELLCEPT) 250 mg Cap TAKE 2 CAPSULES BY MOUTH EVERY 12 HOURS TO PREVENT KIDNEY TRANSPLANT REJECTION, Normal      nifedipine (ADALAT CC) 60 MG TbSR TAKE 1 TABLET BY MOUTH TWICE DAILY., Normal      pantoprazole (PROTONIX) 40 MG tablet TAKE 1 TABLET BY MOUTH DAILY, Normal      paricalcitol (ZEMPLAR) 1 MCG capsule One po MWF, No Print      potassium chloride SA (K-DUR,KLOR-CON) 20 MEQ tablet TAKE 1 TABLET BY MOUTH DAILY, Normal      predniSONE (DELTASONE) 5 MG tablet Take 1 tablet (5 mg total) by mouth every morning., Starting Fri 6/2/2017, Normal      spironolactone (ALDACTONE) 25 MG tablet Take 1 tablet (25 mg total) by mouth once daily., Starting 11/15/2016, Until Discontinued, Normal      tacrolimus (PROGRAF) 1 MG Cap TAKE 4 CAPSULES BY MOUTH IN THE MORNING AND THEN TAKE 3 CAPSULES BY MOUTH IN THE EVENING, Normal      warfarin (COUMADIN) 5 MG tablet TAKE 1 TAB BY MOUTH DAILY EXCEPT ON WEDNESDAYS TAKE 1& 1/2 TABLETS BY MOUTH OR AS DIRECTED BY CLINIC, Normal      albuterol 90 mcg/actuation inhaler Inhale 2 puffs into the lungs every 4 (four) hours as needed for Wheezing. Rescue, Starting Sun 7/16/2017, Until Tue 8/15/2017, Normal      calcium carbonate 500 mg/5 mL (1,250 mg/5 mL) Take 10 mLs (1,000 mg total) by mouth 2 (two) times daily., Starting 2/3/2017, Until Sat 2/3/18, Normal      colchicine 0.6 mg tablet Take 0.6 mg by mouth once daily., Until Discontinued, Historical Med      fexofenadine (ALLEGRA) 60 MG tablet Take 1 tablet by mouth Twice daily as needed., Starting 6/11/2012, Until Discontinued, Historical Med      fluticasone (FLONASE) 50  mcg/actuation nasal spray 1 spray by Each Nare route once daily., Starting 10/7/2016, Until Discontinued, Normal      multivitamin (THERAGRAN) per tablet Take 1 tablet by mouth Daily., Starting 6/11/2012, Until Discontinued, Historical Med      nicotine (NTS STEP 1) 21 mg/24 hr PLACE 1 PATCH ONTO THE SKIN ONCE DAILY., Normal      vitamin D 1000 units Tab Take 370 mg by mouth 2 (two) times daily. 2 tablets BID, Until Discontinued, Historical Med             At the time of discharge, patient was advised to take all medications as directed, and to follow up with all future appointments.  Patient was informed to return to the ER if symptoms worsen or fail to resolve.      Signing Physician:  Nadira Walters MD  [facility]  Cell:  [phone]  Spectra:  [phone]  Pager:  [phone]

## 2017-09-25 NOTE — ED PROVIDER NOTES
Encounter Date: 9/24/2017       History     Chief Complaint   Patient presents with    Shortness of Breath     Since this morning     Patient is a 61-year-old male with PMHx of A. fib and CAD diverticulosis and hyperlipidemia hypertension and kidney transplant and coronary angioplasty with stent placement who presents to the ED complaining of shortness of breath since this morning patient states he awoke with shortness of breath with tightness that radiates from his right scapula to the anterior portion of his right chest patient states that it feels similar to episode when he had pericarditis patient occupation is a  and states his last trip was on Friday.  Patient denies any hemoptysis lower leg swelling dizziness and headache nausea vomiting abdominal pain back pain hematuria          Review of patient's allergies indicates:   Allergen Reactions    Ace inhibitors      Other reaction(s): Swelling    Povidone-iodine      Other reaction(s): Itching  Other reaction(s): Itching    Verapamil Other (See Comments)     Other reaction(s): Unknown     Past Medical History:   Diagnosis Date    Atrial fibrillation     CAD (coronary artery disease)     Diverticulosis     Hyperlipidemia     Hypertension     Immunodeficiency due to treatment with immunosuppressive medication     Metabolic bone disease     Pericarditis     S/P kidney transplant     Thrombocytopenia     Thyroid disease     Unspecified disorder of kidney and ureter     Stage IIICKD     Past Surgical History:   Procedure Laterality Date    CARDIOVERSION  04/30/15    CHOLECYSTECTOMY      COLONOSCOPY  April 20, 2011    Diverticulosis, repeat recommended in 3 yrs., repeat colonoscopy 2014 revealed 2 polyps.  He should return in 5 years.    CORONARY ANGIOPLASTY WITH STENT PLACEMENT  9/13/2006    KIDNEY TRANSPLANT  2005    PARATHYROIDECTOMY       Family History   Problem Relation Age of Onset    No Known Problems Sister     No Known  Problems Brother     Thyroid disease Mother      s/p surgery    Heart disease Father      had pacemaker    Heart disease Daughter      enlarged heart    No Known Problems Sister     Kidney failure Sister     Kidney disease Sister     No Known Problems Sister     Kidney disease Brother     Kidney failure Brother      s/p transplant    No Known Problems Brother     Diabetes Mellitus Neg Hx     Stroke Neg Hx     Heart attack Neg Hx     Cancer Neg Hx     Celiac disease Neg Hx     Cirrhosis Neg Hx     Colon cancer Neg Hx     Esophageal cancer Neg Hx     Inflammatory bowel disease Neg Hx     Rectal cancer Neg Hx     Stomach cancer Neg Hx     Ulcerative colitis Neg Hx     Liver disease Neg Hx     Liver cancer Neg Hx     Crohn's disease Neg Hx      Social History   Substance Use Topics    Smoking status: Current Every Day Smoker     Packs/day: 0.50     Years: 40.00     Types: Cigarettes    Smokeless tobacco: Never Used      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    Alcohol use No     Review of Systems   Constitutional: Negative for fever.   HENT: Negative for congestion.    Eyes: Negative for visual disturbance.   Respiratory: Positive for shortness of breath.    Cardiovascular: Positive for chest pain.   Gastrointestinal: Negative for abdominal distention.   Endocrine: Negative for polyuria.   Genitourinary: Negative for dysuria.     All systems were reviewed/examined and were negative except as noted in the HPI.      Physical Exam     Initial Vitals [09/24/17 2122]   BP Pulse Resp Temp SpO2   (!) 187/87 70 18 98.5 °F (36.9 °C) 100 %      MAP       120.33         Physical Exam    ED Course   Procedures  Labs Reviewed   CBC W/ AUTO DIFFERENTIAL   COMPREHENSIVE METABOLIC PANEL   TROPONIN I   B-TYPE NATRIURETIC PEPTIDE   COMPREHENSIVE METABOLIC PANEL   B-TYPE NATRIURETIC PEPTIDE                               ED Course      Clinical Impression:   The  primary encounter diagnosis was SOB (shortness of breath). Diagnoses of Chest pain and Coronary artery disease involving native coronary artery of native heart without angina pectoris were also pertinent to this visit.          ATTENDING PHYSICIAN ATTESTATION  I have repeated the key portions of the resident's history and physical, reviewed and agree with the resident medical documentation, and supervised and managed the medical care of the patient.  Additionally, I was present for the critical portion of any procedure(s) performed.    Ming Phelps MD, KATHIA, [facility]  Department of Emergency Medicine    All systems were reviewed/examined and were negative except as noted in the HPI.    Medical Decision Making:    This is an emergent evaluation of a patient presenting to the ED.  Nursing notes were reviewed.  I personally reviewed, read, and interpreted the ECG and any monitoring strips.  I reviewed radiology images personally along with interpretations.  I personally reviewed and interpreted the laboratory results.  Communicated with another physician regarding patient's care: hosp med    Ming Phelps MD, KATHIA                   Hernandez Phelps MD  09/26/17 [id number]

## 2017-09-25 NOTE — ASSESSMENT & PLAN NOTE
- rate controlled, NSR on admit  - continue coumadin and rate meds  - previous admissions with SOB, patient was in Afib with RVR

## 2017-09-25 NOTE — NURSING
Uneventful night. No complaints of dyspnea or sob. Denies pain or discomfort. Bed in lowest position, call light in reach.  Spouse at bedside.Notified NP AUTUMN Dominique of critical calcium of 6.6, no orders given.

## 2017-09-25 NOTE — H&P
[facility] Medicine  History & Physical    Patient Name: Ibrahima Phelps  MRN: [id number]  Admission Date: 9/24/2017  Attending Physician: Nadira Walters MD   Primary Care Provider: cSott Miranda Jr, MD    [facility] Medicine Team: Networked reference to record PCT  Otto James PA-C     Patient information was obtained from patient, spouse/SO, past medical records and ER records.     Subjective:     Principal Problem:DAUGHERTY (dyspnea on exertion)    Chief Complaint:   Chief Complaint   Patient presents with    Shortness of Breath     Since this morning        HPI: Ibrahima Phelps is a 61M with PMHx of HTN, HLD, CAD s/p DIEGO 2006, pericarditis, afib on coumadin, s/p kidney transplant 2005, current tobacco user, COPD who presents for evaluation of acute onset DAUGHERTY associated with neck/scapular/back pain. Symptoms started AM of 09/24, improved with rest, worse when ambulatory. He describes his chest pain as a soreness and worsened with deep inspiration. He reports that his symptoms are similar to when he had pericarditis 01/2017 and about 2 years ago. He reports DAUGHERTY when ambulating around his house, which is new. He has not tried any of his home inhaler nor any pain medication for his back pain. He continues to smoke 4-5 cigarettes a day and he works as a  and his last trip was on Friday. He denies any trauma to his neck/back or recent exercise.     On admit, trop negative, EKG in NSR without ischemic changes, pain improved s/p ASA, CXR clear.    Past Medical History:   Diagnosis Date    (HFpEF) heart failure with preserved ejection fraction 9/25/2017    Atrial fibrillation     CAD (coronary artery disease)     Diverticulosis     Hyperlipidemia     Hypertension     Immunodeficiency due to treatment with immunosuppressive medication     Metabolic bone disease     Pericarditis     S/P kidney transplant     Thrombocytopenia     Thyroid disease     Unspecified disorder of  kidney and ureter     Stage IIICKD       Past Surgical History:   Procedure Laterality Date    CARDIOVERSION  04/30/15    CHOLECYSTECTOMY      COLONOSCOPY  April 20, 2011    Diverticulosis, repeat recommended in 3 yrs., repeat colonoscopy 2014 revealed 2 polyps.  He should return in 5 years.    CORONARY ANGIOPLASTY WITH STENT PLACEMENT  9/13/2006    KIDNEY TRANSPLANT  2005    PARATHYROIDECTOMY         Review of patient's allergies indicates:   Allergen Reactions    Ace inhibitors      Other reaction(s): Swelling    Povidone-iodine      Other reaction(s): Itching  Other reaction(s): Itching    Verapamil Other (See Comments)     Other reaction(s): Unknown       No current facility-administered medications on file prior to encounter.      Current Outpatient Prescriptions on File Prior to Encounter   Medication Sig    aspirin (ADULT ASPIRIN EC LOW STRENGTH) 81 MG EC tablet Take 1 tablet by mouth Daily.    atorvastatin (LIPITOR) 10 MG tablet Take 10 mg by mouth once daily.    doxazosin (CARDURA) 4 MG tablet Take 4 mg by mouth every 12 (twelve) hours. Patient takine one tablet twice daily    furosemide (LASIX) 40 MG tablet Take 0.5 tablets (20 mg total) by mouth 2 (two) times daily.    metoprolol tartrate (LOPRESSOR) 50 MG tablet Take 1 tablet (50 mg total) by mouth 2 (two) times daily.    mycophenolate (CELLCEPT) 250 mg Cap TAKE 2 CAPSULES BY MOUTH EVERY 12 HOURS TO PREVENT KIDNEY TRANSPLANT REJECTION    nifedipine (ADALAT CC) 60 MG TbSR TAKE 1 TABLET BY MOUTH TWICE DAILY.    pantoprazole (PROTONIX) 40 MG tablet TAKE 1 TABLET BY MOUTH DAILY    paricalcitol (ZEMPLAR) 1 MCG capsule One po MWF    potassium chloride SA (K-DUR,KLOR-CON) 20 MEQ tablet TAKE 1 TABLET BY MOUTH DAILY    predniSONE (DELTASONE) 5 MG tablet Take 1 tablet (5 mg total) by mouth every morning.    spironolactone (ALDACTONE) 25 MG tablet Take 1 tablet (25 mg total) by mouth once daily.    tacrolimus (PROGRAF) 1 MG Cap TAKE 4 CAPSULES  BY MOUTH IN THE MORNING AND THEN TAKE 3 CAPSULES BY MOUTH IN THE EVENING    warfarin (COUMADIN) 5 MG tablet TAKE 1 TAB BY MOUTH DAILY EXCEPT ON WEDNESDAYS TAKE 1& 1/2 TABLETS BY MOUTH OR AS DIRECTED BY CLINIC    [DISCONTINUED] potassium chloride SA (K-DUR,KLOR-CON) 20 MEQ tablet TAKE 1 TABLET BY MOUTH DAILY    albuterol 90 mcg/actuation inhaler Inhale 2 puffs into the lungs every 4 (four) hours as needed for Wheezing. Rescue    calcium carbonate 500 mg/5 mL (1,250 mg/5 mL) Take 10 mLs (1,000 mg total) by mouth 2 (two) times daily.    colchicine 0.6 mg tablet Take 0.6 mg by mouth once daily.    fexofenadine (ALLEGRA) 60 MG tablet Take 1 tablet by mouth Twice daily as needed.    fluticasone (FLONASE) 50 mcg/actuation nasal spray 1 spray by Each Nare route once daily.    multivitamin (THERAGRAN) per tablet Take 1 tablet by mouth Daily.    nicotine (NTS STEP 1) 21 mg/24 hr PLACE 1 PATCH ONTO THE SKIN ONCE DAILY.    vitamin D 1000 units Tab Take 370 mg by mouth 2 (two) times daily. 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    Alcohol use No    Drug use: No    Sexual activity: Yes     Partners: Female     Review of Systems   Constitutional: Negative for chills, fatigue and fever.   HENT: Negative for sore throat and trouble swallowing.    Eyes: Negative for visual disturbance.   Respiratory: Positive for shortness of breath. Negative for cough, choking, wheezing and stridor.    Cardiovascular: Positive for chest pain. Negative for  palpitations and leg swelling.   Gastrointestinal: Negative for abdominal distention, abdominal pain, constipation, diarrhea, nausea and vomiting.   Genitourinary: Negative for difficulty urinating and dysuria.   Musculoskeletal: Positive for arthralgias, back pain, myalgias and neck pain. Negative for neck stiffness.   Skin: Negative for rash and wound.   Neurological: Negative for dizziness, tremors, weakness, light-headedness, numbness and headaches.   Psychiatric/Behavioral: Negative for agitation and confusion.     Objective:     Vital Signs (Most Recent):  Temp: 98.5 °F (36.9 °C) (09/25/17 0209)  Pulse: 66 (09/25/17 0225)  Resp: 18 (09/25/17 0209)  BP: (!) 180/89 (09/25/17 0133)  SpO2: 100 % (ra) (09/25/17 0129) Vital Signs (24h Range):  Temp:  [98.5 °F (36.9 °C)] 98.5 °F (36.9 °C)  Pulse:  [58-71] 66  Resp:  [14-22] 18  SpO2:  [99 %-100 %] 100 %  BP: (168-193)/(80-94) 180/89     Weight: 108.9 kg (240 lb)  Body mass index is 31.66 kg/m².    Physical Exam   Constitutional: He is oriented to person, place, and time. He appears well-developed and well-nourished. No distress.   HENT:   Head: Normocephalic and atraumatic.   Eyes: EOM are normal. Pupils are equal, round, and reactive to light.   Neck: Trachea normal, normal range of motion and full passive range of motion without pain. Neck supple. Muscular tenderness (R trap TTP) present.   Cardiovascular: Normal rate.   Extrasystoles are present.   No murmur heard.  Pulmonary/Chest: Effort normal and breath sounds normal. No accessory muscle usage. No tachypnea. No respiratory distress. He has no decreased breath sounds. He has no wheezes. He has no rales.   No chest pain when asked to lean forward   Abdominal: Soft. Bowel sounds are normal. He exhibits no distension. There is no tenderness.   obese   Musculoskeletal: Normal range of motion. He exhibits no edema.   Neurological: He is alert and oriented to person, place, and time. He has normal strength. He  displays no tremor. No cranial nerve deficit. GCS eye subscore is 4. GCS verbal subscore is 5. GCS motor subscore is 6.   No focal deficits   Skin: He is not diaphoretic.   Psychiatric: His behavior is normal. Judgment and thought content normal. His affect is blunt.   Nursing note and vitals reviewed.       Significant Labs:   CBC:   Recent Labs  Lab 09/24/17  2311   WBC 10.24   HGB 11.3*   HCT 35.2*        CMP:   Recent Labs  Lab 09/24/17  2311     143   K 3.7  3.7     108   CO2 25  25     106   BUN 17  17   CREATININE 1.8*  1.8*   CALCIUM 6.6*  6.6*   PROT 6.2  6.2   ALBUMIN 2.8*  2.8*   BILITOT 0.3  0.3   ALKPHOS 78  78   AST 18  18   ALT 12  12   ANIONGAP 10  10   EGFRNONAA 39.5*  39.5*     Cardiac Markers:   Recent Labs  Lab 09/24/17  2311   *  221*     Magnesium: No results for input(s): MG in the last 48 hours.  TSH: No results for input(s): TSH in the last 4320 hours.  Urine Studies: No results for input(s): COLORU, APPEARANCEUA, PHUR, SPECGRAV, PROTEINUA, GLUCUA, KETONESU, BILIRUBINUA, OCCULTUA, NITRITE, UROBILINOGEN, LEUKOCYTESUR, RBCUA, WBCUA, BACTERIA, SQUAMEPITHEL, HYALINECASTS in the last 48 hours.    Invalid input(s): WRIGHTSUR    Significant Imaging: CXR: I have reviewed all pertinent results/findings within the past 24 hours and my personal findings are:  no acute findings  EKG: I have reviewed all pertinent results/findings within the past 24 hours and my personal findings are: NSR no ischemic changes    Assessment/Plan:     * DAUGHERTY (dyspnea on exertion)    - reports similar symptoms when diagnosed with pericarditis and when in afib with RVR  - EKG: Sinus rhythm with occasional Premature ventricular complexes, Nonspecific T wave abnormality, no ST changes  - 100% on RA  - trend trops 0.020> , pending repeat echo, tele  - current daily smoker: no wheeze, no crackles, good air movement, CTAB  -  and last trip on 09/22: INR therapeutic, but  can continue to consider PE  - does not appear in decompensated HF, CXR clear  - check CRP, previously elevated when diagnosed with pericarditis         (HFpEF) heart failure with preserved ejection fraction    - last echo 01/2017: EF 55%, +DD  - CXR without edema, BNP near baseline, euvolemic   - repeat echo pending given new symptoms        Chronic obstructive pulmonary disease    - duonebs, not on daily medications  - no wheeze on exam  - tobacco counseling, current smoker        History of pericarditis    - last episode 01/2017, see d/c summary        Atrial fibrillation    - rate controlled, NSR on admit  - continue coumadin and rate meds  - previous admissions with SOB, patient was in Afib with RVR        Strain of right trapezius muscle    - supportive care        Essential hypertension    - uncontrolled on admit, max 193/94  - continue home medications: doxazosin 4 mg BID, lasix 20 mg BID, lopressor 50 mg BID, nifedipine 60 mg BID, spironolactone 25 mg daily, add PRN hydralazine  - titrate as necessary        H/O kidney transplant    - continue prograf, mycophenolate, prednisone  - daily prograf levels  - renal function at baseline        CAD (coronary artery disease)    - continue asa, statin  - reports DAUGHERTY, trops negative, EKG without ischemic changes          VTE Risk Mitigation         Ordered     warfarin (COUMADIN) tablet 5 mg  Daily     Route:  Oral        09/25/17 0226     Medium Risk of VTE  Once      09/25/17 0226     Reason for No Pharmacological VTE Prophylaxis  Once      09/25/17 0226             Otto James PA-C  Department of Hospital Medicine   [facility]

## 2017-09-25 NOTE — HPI
Ibrahima Phelps is a 61M with PMHx of HTN, HLD, CAD s/p DIEGO 2006, pericarditis, afib on coumadin, s/p kidney transplant 2005, current tobacco user, COPD who presents for evaluation of acute onset DAUGHERTY associated with neck/scapular/back pain. Symptoms started AM of 09/24, improved with rest, worse when ambulatory. He describes his chest pain as a soreness and worsened with deep inspiration. He reports that his symptoms are similar to when he had pericarditis 01/2017 and about 2 years ago. He reports DAUGHERTY when ambulating around his house, which is new. He has not tried any of his home inhaler nor any pain medication for his back pain. He continues to smoke 4-5 cigarettes a day and he works as a  and his last trip was on Friday. He denies any trauma to his neck/back or recent exercise.     On admit, trop negative, EKG in NSR without ischemic changes, pain improved s/p ASA, CXR clear.

## 2017-09-25 NOTE — ED NOTES
Patient complaining of SOB that started 730am when he woke up. Denies use of o2. Current smoker. Denies CP. Denies fevers, chills, dizziness.

## 2017-09-25 NOTE — SUBJECTIVE & OBJECTIVE
Past Medical History:   Diagnosis Date    (HFpEF) heart failure with preserved ejection fraction 9/25/2017    Atrial fibrillation     CAD (coronary artery disease)     Diverticulosis     Hyperlipidemia     Hypertension     Immunodeficiency due to treatment with immunosuppressive medication     Metabolic bone disease     Pericarditis     S/P kidney transplant     Thrombocytopenia     Thyroid disease     Unspecified disorder of kidney and ureter     Stage IIICKD       Past Surgical History:   Procedure Laterality Date    CARDIOVERSION  04/30/15    CHOLECYSTECTOMY      COLONOSCOPY  April 20, 2011    Diverticulosis, repeat recommended in 3 yrs., repeat colonoscopy 2014 revealed 2 polyps.  He should return in 5 years.    CORONARY ANGIOPLASTY WITH STENT PLACEMENT  9/13/2006    KIDNEY TRANSPLANT  2005    PARATHYROIDECTOMY         Review of patient's allergies indicates:   Allergen Reactions    Ace inhibitors      Other reaction(s): Swelling    Povidone-iodine      Other reaction(s): Itching  Other reaction(s): Itching    Verapamil Other (See Comments)     Other reaction(s): Unknown       No current facility-administered medications on file prior to encounter.      Current Outpatient Prescriptions on File Prior to Encounter   Medication Sig    aspirin (ADULT ASPIRIN EC LOW STRENGTH) 81 MG EC tablet Take 1 tablet by mouth Daily.    atorvastatin (LIPITOR) 10 MG tablet Take 10 mg by mouth once daily.    doxazosin (CARDURA) 4 MG tablet Take 4 mg by mouth every 12 (twelve) hours. Patient takine one tablet twice daily    furosemide (LASIX) 40 MG tablet Take 0.5 tablets (20 mg total) by mouth 2 (two) times daily.    metoprolol tartrate (LOPRESSOR) 50 MG tablet Take 1 tablet (50 mg total) by mouth 2 (two) times daily.    mycophenolate (CELLCEPT) 250 mg Cap TAKE 2 CAPSULES BY MOUTH EVERY 12 HOURS TO PREVENT KIDNEY TRANSPLANT REJECTION    nifedipine (ADALAT CC) 60 MG TbSR TAKE 1 TABLET BY MOUTH TWICE  DAILY.    pantoprazole (PROTONIX) 40 MG tablet TAKE 1 TABLET BY MOUTH DAILY    paricalcitol (ZEMPLAR) 1 MCG capsule One po MWF    potassium chloride SA (K-DUR,KLOR-CON) 20 MEQ tablet TAKE 1 TABLET BY MOUTH DAILY    predniSONE (DELTASONE) 5 MG tablet Take 1 tablet (5 mg total) by mouth every morning.    spironolactone (ALDACTONE) 25 MG tablet Take 1 tablet (25 mg total) by mouth once daily.    tacrolimus (PROGRAF) 1 MG Cap TAKE 4 CAPSULES BY MOUTH IN THE MORNING AND THEN TAKE 3 CAPSULES BY MOUTH IN THE EVENING    warfarin (COUMADIN) 5 MG tablet TAKE 1 TAB BY MOUTH DAILY EXCEPT ON WEDNESDAYS TAKE 1& 1/2 TABLETS BY MOUTH OR AS DIRECTED BY CLINIC    [DISCONTINUED] potassium chloride SA (K-DUR,KLOR-CON) 20 MEQ tablet TAKE 1 TABLET BY MOUTH DAILY    albuterol 90 mcg/actuation inhaler Inhale 2 puffs into the lungs every 4 (four) hours as needed for Wheezing. Rescue    calcium carbonate 500 mg/5 mL (1,250 mg/5 mL) Take 10 mLs (1,000 mg total) by mouth 2 (two) times daily.    colchicine 0.6 mg tablet Take 0.6 mg by mouth once daily.    fexofenadine (ALLEGRA) 60 MG tablet Take 1 tablet by mouth Twice daily as needed.    fluticasone (FLONASE) 50 mcg/actuation nasal spray 1 spray by Each Nare route once daily.    multivitamin (THERAGRAN) per tablet Take 1 tablet by mouth Daily.    nicotine (NTS STEP 1) 21 mg/24 hr PLACE 1 PATCH ONTO THE SKIN ONCE DAILY.    vitamin D 1000 units Tab Take 370 mg by mouth 2 (two) times daily. 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 He is not exercising.    Alcohol use No    Drug use: No    Sexual activity: Yes     Partners: Female     Review of Systems   Constitutional: Negative for chills, fatigue and fever.   HENT: Negative for sore throat and trouble swallowing.    Eyes: Negative for visual disturbance.   Respiratory: Positive for shortness of breath. Negative for cough, choking, wheezing and stridor.    Cardiovascular: Positive for chest pain. Negative for palpitations and leg swelling.   Gastrointestinal: Negative for abdominal distention, abdominal pain, constipation, diarrhea, nausea and vomiting.   Genitourinary: Negative for difficulty urinating and dysuria.   Musculoskeletal: Positive for arthralgias, back pain, myalgias and neck pain. Negative for neck stiffness.   Skin: Negative for rash and wound.   Neurological: Negative for dizziness, tremors, weakness, light-headedness, numbness and headaches.   Psychiatric/Behavioral: Negative for agitation and confusion.     Objective:     Vital Signs (Most Recent):  Temp: 98.5 °F (36.9 °C) (09/25/17 0209)  Pulse: 66 (09/25/17 0225)  Resp: 18 (09/25/17 0209)  BP: (!) 180/89 (09/25/17 0133)  SpO2: 100 % (ra) (09/25/17 0129) Vital Signs (24h Range):  Temp:  [98.5 °F (36.9 °C)] 98.5 °F (36.9 °C)  Pulse:  [58-71] 66  Resp:  [14-22] 18  SpO2:  [99 %-100 %] 100 %  BP: (168-193)/(80-94) 180/89     Weight: 108.9 kg (240 lb)  Body mass index is 31.66 kg/m².    Physical Exam   Constitutional: He is oriented to person, place, and time. He appears well-developed and well-nourished. No distress.   HENT:   Head: Normocephalic and atraumatic.   Eyes: EOM are normal. Pupils are equal, round, and reactive to light.   Neck: Trachea normal, normal range of motion and full passive range of motion without pain. Neck supple. Muscular tenderness (R trap TTP) present.   Cardiovascular: Normal rate.   Extrasystoles are present.   No murmur heard.  Pulmonary/Chest: Effort normal and breath sounds normal.  No accessory muscle usage. No tachypnea. No respiratory distress. He has no decreased breath sounds. He has no wheezes. He has no rales.   No chest pain when asked to lean forward   Abdominal: Soft. Bowel sounds are normal. He exhibits no distension. There is no tenderness.   obese   Musculoskeletal: Normal range of motion. He exhibits no edema.   Neurological: He is alert and oriented to person, place, and time. He has normal strength. He displays no tremor. No cranial nerve deficit. GCS eye subscore is 4. GCS verbal subscore is 5. GCS motor subscore is 6.   No focal deficits   Skin: He is not diaphoretic.   Psychiatric: His behavior is normal. Judgment and thought content normal. His affect is blunt.   Nursing note and vitals reviewed.       Significant Labs:   CBC:   Recent Labs  Lab 09/24/17 2311   WBC 10.24   HGB 11.3*   HCT 35.2*        CMP:   Recent Labs  Lab 09/24/17 2311     143   K 3.7  3.7     108   CO2 25  25     106   BUN 17  17   CREATININE 1.8*  1.8*   CALCIUM 6.6*  6.6*   PROT 6.2  6.2   ALBUMIN 2.8*  2.8*   BILITOT 0.3  0.3   ALKPHOS 78  78   AST 18  18   ALT 12  12   ANIONGAP 10  10   EGFRNONAA 39.5*  39.5*     Cardiac Markers:   Recent Labs  Lab 09/24/17  2311   *  221*     Magnesium: No results for input(s): MG in the last 48 hours.  TSH: No results for input(s): TSH in the last 4320 hours.  Urine Studies: No results for input(s): COLORU, APPEARANCEUA, PHUR, SPECGRAV, PROTEINUA, GLUCUA, KETONESU, BILIRUBINUA, OCCULTUA, NITRITE, UROBILINOGEN, LEUKOCYTESUR, RBCUA, WBCUA, BACTERIA, SQUAMEPITHEL, HYALINECASTS in the last 48 hours.    Invalid input(s): WRIGHTSUR    Significant Imaging: CXR: I have reviewed all pertinent results/findings within the past 24 hours and my personal findings are:  no acute findings  EKG: I have reviewed all pertinent results/findings within the past 24 hours and my personal findings are: NSR no ischemic changes

## 2017-09-25 NOTE — ASSESSMENT & PLAN NOTE
- last echo 01/2017: EF 55%, +DD  - CXR without edema, BNP near baseline, euvolemic   - repeat echo pending given new symptoms

## 2017-09-25 NOTE — ED NOTES
Patient identifiers verified and correct for Ibrahimastephanie Phelps.    LOC: The patient is awake, alert and aware of environment with an appropriate affect, the patient is oriented x 3 and speaking appropriately.  APPEARANCE: Patient resting comfortably and in no acute distress, patient is clean and well groomed, patient's clothing is properly fastened.  SKIN: The skin is warm and dry, color consistent with ethnicity, patient has normal skin turgor and moist mucus membranes, skin intact, no breakdown or bruising noted.  MUSCULOSKELETAL: Patient moving all extremities spontaneously, no obvious swelling or deformities noted.  RESPIRATORY: Airway is open and patent, respirations are spontaneous, patient respiratory rate, accessory muscle use noted, bilateral breath sounds clear  CARDIAC: Patient has a normal rate and regular rhythm, no periphreal edema noted, capillary refill < 3 seconds.  ABDOMEN: Soft and non tender to palpation, no distention noted, normoactive bowel sounds present in all four quadrants.  NEUROLOGIC: PERRL, 5mm bilaterally, eyes open spontaneously, behavior appropriate to situation, follows commands, facial expression symmetrical, bilateral hand grasp equal and even, purposeful motor response noted, normal sensation in all extremities when touched with a finger.

## 2017-09-25 NOTE — ASSESSMENT & PLAN NOTE
- continue prograf, mycophenolate, prednisone  - daily prograf levels  - renal function at baseline

## 2017-10-02 ENCOUNTER — ANTI-COAG VISIT (OUTPATIENT)
Dept: CARDIOLOGY | Facility: CLINIC | Age: 62
End: 2017-10-02
Payer: COMMERCIAL

## 2017-10-02 ENCOUNTER — LAB VISIT (OUTPATIENT)
Dept: LAB | Facility: HOSPITAL | Age: 62
End: 2017-10-02
Payer: COMMERCIAL

## 2017-10-02 ENCOUNTER — OFFICE VISIT (OUTPATIENT)
Dept: NEPHROLOGY | Facility: CLINIC | Age: 62
End: 2017-10-02
Payer: COMMERCIAL

## 2017-10-02 VITALS
OXYGEN SATURATION: 99 % | SYSTOLIC BLOOD PRESSURE: 136 MMHG | BODY MASS INDEX: 31.32 KG/M2 | WEIGHT: 236.31 LBS | HEART RATE: 63 BPM | DIASTOLIC BLOOD PRESSURE: 64 MMHG | HEIGHT: 73 IN

## 2017-10-02 DIAGNOSIS — N18.30 CKD (CHRONIC KIDNEY DISEASE) STAGE 3, GFR 30-59 ML/MIN: ICD-10-CM

## 2017-10-02 DIAGNOSIS — N25.81 SECONDARY RENAL HYPERPARATHYROIDISM: ICD-10-CM

## 2017-10-02 DIAGNOSIS — E66.9 OBESITY (BMI 30-39.9): ICD-10-CM

## 2017-10-02 DIAGNOSIS — I10 ESSENTIAL HYPERTENSION: ICD-10-CM

## 2017-10-02 DIAGNOSIS — Z94.89 TRANSPLANT RECIPIENT: Primary | ICD-10-CM

## 2017-10-02 DIAGNOSIS — I48.91 ATRIAL FIBRILLATION, UNSPECIFIED TYPE: ICD-10-CM

## 2017-10-02 DIAGNOSIS — Z86.79 HISTORY OF PERICARDITIS: ICD-10-CM

## 2017-10-02 DIAGNOSIS — Z94.0 H/O KIDNEY TRANSPLANT: ICD-10-CM

## 2017-10-02 DIAGNOSIS — R73.03 PREDIABETES: ICD-10-CM

## 2017-10-02 DIAGNOSIS — Z72.0 TOBACCO USE: ICD-10-CM

## 2017-10-02 DIAGNOSIS — E78.5 HYPERLIPIDEMIA, UNSPECIFIED HYPERLIPIDEMIA TYPE: ICD-10-CM

## 2017-10-02 DIAGNOSIS — Z79.01 LONG-TERM (CURRENT) USE OF ANTICOAGULANTS: Primary | ICD-10-CM

## 2017-10-02 PROBLEM — A41.9 SEPSIS: Status: RESOLVED | Noted: 2017-01-23 | Resolved: 2017-10-02

## 2017-10-02 LAB
ALBUMIN SERPL BCP-MCNC: 3.2 G/DL
ANION GAP SERPL CALC-SCNC: 10 MMOL/L
BASOPHILS # BLD AUTO: 0.01 K/UL
BASOPHILS NFR BLD: 0.1 %
BUN SERPL-MCNC: 20 MG/DL
CALCIUM SERPL-MCNC: 7.5 MG/DL
CHLORIDE SERPL-SCNC: 109 MMOL/L
CO2 SERPL-SCNC: 23 MMOL/L
CREAT SERPL-MCNC: 2 MG/DL
DIFFERENTIAL METHOD: ABNORMAL
EOSINOPHIL # BLD AUTO: 0.4 K/UL
EOSINOPHIL NFR BLD: 4.4 %
ERYTHROCYTE [DISTWIDTH] IN BLOOD BY AUTOMATED COUNT: 14.2 %
EST. GFR  (AFRICAN AMERICAN): 40.2 ML/MIN/1.73 M^2
EST. GFR  (NON AFRICAN AMERICAN): 34.7 ML/MIN/1.73 M^2
ESTIMATED AVG GLUCOSE: 120 MG/DL
GLUCOSE SERPL-MCNC: 101 MG/DL
HBA1C MFR BLD HPLC: 5.8 %
HCT VFR BLD AUTO: 38.4 %
HGB BLD-MCNC: 12.1 G/DL
INR PPP: 1.9 (ref 2–3)
LYMPHOCYTES # BLD AUTO: 2 K/UL
LYMPHOCYTES NFR BLD: 24.8 %
MCH RBC QN AUTO: 25.5 PG
MCHC RBC AUTO-ENTMCNC: 31.5 G/DL
MCV RBC AUTO: 81 FL
MONOCYTES # BLD AUTO: 0.6 K/UL
MONOCYTES NFR BLD: 7 %
NEUTROPHILS # BLD AUTO: 5.1 K/UL
NEUTROPHILS NFR BLD: 63.2 %
PHOSPHATE SERPL-MCNC: 2.8 MG/DL
PLATELET # BLD AUTO: 220 K/UL
PMV BLD AUTO: 11.8 FL
POTASSIUM SERPL-SCNC: 4.1 MMOL/L
PTH-INTACT SERPL-MCNC: 79 PG/ML
RBC # BLD AUTO: 4.74 M/UL
SODIUM SERPL-SCNC: 142 MMOL/L
TACROLIMUS BLD-MCNC: 5.6 NG/ML
TSH SERPL DL<=0.005 MIU/L-ACNC: 1.45 UIU/ML
WBC # BLD AUTO: 8.14 K/UL

## 2017-10-02 PROCEDURE — 80197 ASSAY OF TACROLIMUS: CPT

## 2017-10-02 PROCEDURE — 83036 HEMOGLOBIN GLYCOSYLATED A1C: CPT

## 2017-10-02 PROCEDURE — 99214 OFFICE O/P EST MOD 30 MIN: CPT | Mod: S$GLB,,, | Performed by: INTERNAL MEDICINE

## 2017-10-02 PROCEDURE — 83970 ASSAY OF PARATHORMONE: CPT

## 2017-10-02 PROCEDURE — 99211 OFF/OP EST MAY X REQ PHY/QHP: CPT | Mod: 25,S$GLB,,

## 2017-10-02 PROCEDURE — 99999 PR PBB SHADOW E&M-EST. PATIENT-LVL IV: CPT | Mod: PBBFAC,,, | Performed by: INTERNAL MEDICINE

## 2017-10-02 PROCEDURE — 85610 PROTHROMBIN TIME: CPT | Mod: QW,S$GLB,,

## 2017-10-02 PROCEDURE — 85025 COMPLETE CBC W/AUTO DIFF WBC: CPT

## 2017-10-02 PROCEDURE — 36415 COLL VENOUS BLD VENIPUNCTURE: CPT

## 2017-10-02 PROCEDURE — 84443 ASSAY THYROID STIM HORMONE: CPT

## 2017-10-02 PROCEDURE — 80069 RENAL FUNCTION PANEL: CPT

## 2017-10-02 RX ORDER — METHYLPREDNISOLONE 4 MG/1
TABLET ORAL
Qty: 1 PACKAGE | Refills: 6 | Status: SHIPPED | OUTPATIENT
Start: 2017-10-02 | End: 2017-10-20

## 2017-10-02 NOTE — PROGRESS NOTES
Patient confirms dose, but 1 missed dose per calendar. Recent ED visit for DAUGHERTY, no changes in medications.  Will boost and resume. I have reviewed the student's initial findings and agree with their assessment. I have personally spoken with and assessed the patient in clinic to devise care plan.

## 2017-10-03 ENCOUNTER — TELEPHONE (OUTPATIENT)
Dept: NEPHROLOGY | Facility: CLINIC | Age: 62
End: 2017-10-03

## 2017-10-03 ENCOUNTER — PATIENT MESSAGE (OUTPATIENT)
Dept: NEPHROLOGY | Facility: CLINIC | Age: 62
End: 2017-10-03

## 2017-10-03 DIAGNOSIS — N17.9 AKI (ACUTE KIDNEY INJURY): Primary | ICD-10-CM

## 2017-10-04 NOTE — TELEPHONE ENCOUNTER
No answer at either number.  Mailbox full at mobile and fax tone at home.   It appears they have not read the [employer] message.

## 2017-10-05 RX ORDER — PARICALCITOL 1 UG/1
CAPSULE, LIQUID FILLED ORAL
Qty: 90 CAPSULE | Refills: 3 | Status: SHIPPED | OUTPATIENT
Start: 2017-10-05 | End: 2018-02-12 | Stop reason: ALTCHOICE

## 2017-10-09 ENCOUNTER — TELEPHONE (OUTPATIENT)
Dept: NEPHROLOGY | Facility: CLINIC | Age: 62
End: 2017-10-09

## 2017-10-09 DIAGNOSIS — M79.673 PAIN OF FOOT, UNSPECIFIED LATERALITY: Primary | ICD-10-CM

## 2017-10-09 DIAGNOSIS — M54.9 BACK PAIN, UNSPECIFIED BACK LOCATION, UNSPECIFIED BACK PAIN LATERALITY, UNSPECIFIED CHRONICITY: ICD-10-CM

## 2017-10-09 NOTE — TELEPHONE ENCOUNTER
Please refer pt to   1. Podiatry for foot pain    2. Back and Spine for low back pain    Orders in. Thank you

## 2017-10-09 NOTE — PROGRESS NOTES
Subjective:       Patient ID: Ibrahima Phelps is a 61 y.o. Black or  male who presents for follow-up evaluation of Chronic Kidney Disease    HPI     He was hospitalized overnight for SOB and CP which both resolved spontaneously. No recent illness nor fever. No LUTS ot allograft pain. His main complaint to me is his chronic LBP which is worsening. He is considering the [facility] for evaluation. He had one episode of gout otherwise he is doing well    Review of Systems   Constitutional: Negative for activity change, appetite change, fatigue, fever and unexpected weight change.   HENT: Negative for facial swelling.    Respiratory: Negative for shortness of breath.    Cardiovascular: Negative for chest pain and leg swelling.   Gastrointestinal: Negative for abdominal pain.   Genitourinary: Negative for difficulty urinating, dysuria, frequency and hematuria.   Musculoskeletal: Negative for arthralgias.   Neurological: Negative for weakness and headaches.   Hematological: Does not bruise/bleed easily.   Psychiatric/Behavioral: Negative for decreased concentration.       Objective:      Physical Exam   Constitutional: He is oriented to person, place, and time. He appears well-developed and well-nourished.   Neck: No JVD present.   Cardiovascular: S1 normal and S2 normal.  Exam reveals no friction rub.    Pulmonary/Chest: He has wheezes. He has no rales.   Abdominal: Soft.   Musculoskeletal: He exhibits no edema.   Neurological: He is alert and oriented to person, place, and time.   Skin: Skin is warm and dry.   Psychiatric: He has a normal mood and affect.   Vitals reviewed.      Assessment:       1. Transplant recipient    2. CKD (chronic kidney disease) stage 3, GFR 30-59 ml/min    3. Secondary renal hyperparathyroidism    4. Tobacco use    5. Prediabetes    6. Obesity (BMI 30-39.9)    7. H/O kidney transplant    8. History of pericarditis    9. Atrial fibrillation, unspecified type    10.  Hyperlipidemia, unspecified hyperlipidemia type        Plan:             ESRD s/p kidney transplsant with resultant CKD. His creatinine is higher than baseline but prograf is pending. If Prograf not high will need to repeat chemistry to trend. Per hisotry he has no reason to have KATINA    HTN is controlled    PreDiabetes is better    Mineral and Bone disease--continue current treatment    Back pain--no NSAIDs, refer    Refer to podiatry    RTC 4 months with labs prior

## 2017-10-14 ENCOUNTER — LAB VISIT (OUTPATIENT)
Dept: LAB | Facility: HOSPITAL | Age: 62
End: 2017-10-14
Attending: INTERNAL MEDICINE
Payer: COMMERCIAL

## 2017-10-14 DIAGNOSIS — Z94.89 TRANSPLANT RECIPIENT: ICD-10-CM

## 2017-10-14 LAB
ALBUMIN SERPL BCP-MCNC: 3.2 G/DL
ALBUMIN SERPL BCP-MCNC: 3.2 G/DL
ALP SERPL-CCNC: 69 U/L
ALT SERPL W/O P-5'-P-CCNC: 18 U/L
ANION GAP SERPL CALC-SCNC: 12 MMOL/L
AST SERPL-CCNC: 18 U/L
BASOPHILS # BLD AUTO: 0.01 K/UL
BASOPHILS NFR BLD: 0.1 %
BILIRUB DIRECT SERPL-MCNC: 0.1 MG/DL
BILIRUB SERPL-MCNC: 0.3 MG/DL
BUN SERPL-MCNC: 21 MG/DL
CALCIUM SERPL-MCNC: 7.5 MG/DL
CHLORIDE SERPL-SCNC: 109 MMOL/L
CO2 SERPL-SCNC: 22 MMOL/L
CREAT SERPL-MCNC: 1.9 MG/DL
DIFFERENTIAL METHOD: ABNORMAL
EOSINOPHIL # BLD AUTO: 0.4 K/UL
EOSINOPHIL NFR BLD: 5.2 %
ERYTHROCYTE [DISTWIDTH] IN BLOOD BY AUTOMATED COUNT: 14.7 %
EST. GFR  (AFRICAN AMERICAN): 42.7 ML/MIN/1.73 M^2
EST. GFR  (NON AFRICAN AMERICAN): 37 ML/MIN/1.73 M^2
GLUCOSE SERPL-MCNC: 108 MG/DL
HCT VFR BLD AUTO: 38.1 %
HGB BLD-MCNC: 12.1 G/DL
LYMPHOCYTES # BLD AUTO: 1.8 K/UL
LYMPHOCYTES NFR BLD: 26.2 %
MCH RBC QN AUTO: 25.6 PG
MCHC RBC AUTO-ENTMCNC: 31.8 G/DL
MCV RBC AUTO: 81 FL
MONOCYTES # BLD AUTO: 0.7 K/UL
MONOCYTES NFR BLD: 9.8 %
NEUTROPHILS # BLD AUTO: 4 K/UL
NEUTROPHILS NFR BLD: 58.4 %
PHOSPHATE SERPL-MCNC: 3.7 MG/DL
PLATELET # BLD AUTO: 185 K/UL
PMV BLD AUTO: 11.8 FL
POTASSIUM SERPL-SCNC: 4.3 MMOL/L
PROT SERPL-MCNC: 6.7 G/DL
PTH-INTACT SERPL-MCNC: 75 PG/ML
RBC # BLD AUTO: 4.72 M/UL
SODIUM SERPL-SCNC: 143 MMOL/L
WBC # BLD AUTO: 6.87 K/UL

## 2017-10-14 PROCEDURE — 36415 COLL VENOUS BLD VENIPUNCTURE: CPT

## 2017-10-14 PROCEDURE — 84075 ASSAY ALKALINE PHOSPHATASE: CPT

## 2017-10-14 PROCEDURE — 85025 COMPLETE CBC W/AUTO DIFF WBC: CPT

## 2017-10-14 PROCEDURE — 80197 ASSAY OF TACROLIMUS: CPT

## 2017-10-14 PROCEDURE — 80069 RENAL FUNCTION PANEL: CPT

## 2017-10-14 PROCEDURE — 82247 BILIRUBIN TOTAL: CPT

## 2017-10-14 PROCEDURE — 83970 ASSAY OF PARATHORMONE: CPT

## 2017-10-15 LAB — TACROLIMUS BLD-MCNC: 5.3 NG/ML

## 2017-10-16 ENCOUNTER — PATIENT MESSAGE (OUTPATIENT)
Dept: NEPHROLOGY | Facility: CLINIC | Age: 62
End: 2017-10-16

## 2017-10-19 ENCOUNTER — TELEPHONE (OUTPATIENT)
Dept: SPINE | Facility: CLINIC | Age: 62
End: 2017-10-19

## 2017-10-19 DIAGNOSIS — M54.9 BACK PAIN, UNSPECIFIED BACK LOCATION, UNSPECIFIED BACK PAIN LATERALITY, UNSPECIFIED CHRONICITY: Primary | ICD-10-CM

## 2017-10-20 ENCOUNTER — HOSPITAL ENCOUNTER (OUTPATIENT)
Dept: RADIOLOGY | Facility: OTHER | Age: 62
Discharge: HOME | End: 2017-10-20
Attending: PHYSICIAN ASSISTANT
Payer: COMMERCIAL

## 2017-10-20 ENCOUNTER — OFFICE VISIT (OUTPATIENT)
Dept: SPINE | Facility: CLINIC | Age: 62
End: 2017-10-20
Payer: COMMERCIAL

## 2017-10-20 ENCOUNTER — TELEPHONE (OUTPATIENT)
Dept: NEPHROLOGY | Facility: CLINIC | Age: 62
End: 2017-10-20

## 2017-10-20 ENCOUNTER — OFFICE VISIT (OUTPATIENT)
Dept: PODIATRY | Facility: CLINIC | Age: 62
End: 2017-10-20
Payer: COMMERCIAL

## 2017-10-20 VITALS
DIASTOLIC BLOOD PRESSURE: 68 MMHG | SYSTOLIC BLOOD PRESSURE: 140 MMHG | BODY MASS INDEX: 30.75 KG/M2 | WEIGHT: 232 LBS | HEART RATE: 60 BPM | HEIGHT: 73 IN

## 2017-10-20 VITALS
HEIGHT: 73 IN | SYSTOLIC BLOOD PRESSURE: 156 MMHG | DIASTOLIC BLOOD PRESSURE: 80 MMHG | BODY MASS INDEX: 30.75 KG/M2 | HEART RATE: 52 BPM | RESPIRATION RATE: 18 BRPM | WEIGHT: 232 LBS

## 2017-10-20 DIAGNOSIS — M54.9 BACK PAIN, UNSPECIFIED BACK LOCATION, UNSPECIFIED BACK PAIN LATERALITY, UNSPECIFIED CHRONICITY: ICD-10-CM

## 2017-10-20 DIAGNOSIS — M51.37 DDD (DEGENERATIVE DISC DISEASE), LUMBOSACRAL: ICD-10-CM

## 2017-10-20 DIAGNOSIS — M47.819 SPONDYLOSIS WITHOUT MYELOPATHY: ICD-10-CM

## 2017-10-20 DIAGNOSIS — G89.29 CHRONIC LEFT-SIDED LOW BACK PAIN WITH LEFT-SIDED SCIATICA: ICD-10-CM

## 2017-10-20 DIAGNOSIS — M54.14 THORACIC AND LUMBOSACRAL NEURITIS: ICD-10-CM

## 2017-10-20 DIAGNOSIS — M54.17 THORACIC AND LUMBOSACRAL NEURITIS: ICD-10-CM

## 2017-10-20 DIAGNOSIS — M54.42 CHRONIC LEFT-SIDED LOW BACK PAIN WITH LEFT-SIDED SCIATICA: ICD-10-CM

## 2017-10-20 DIAGNOSIS — M72.2 PLANTAR FASCIITIS: Primary | ICD-10-CM

## 2017-10-20 DIAGNOSIS — M54.42 RIGHT-SIDED LOW BACK PAIN WITH LEFT-SIDED SCIATICA, UNSPECIFIED CHRONICITY: ICD-10-CM

## 2017-10-20 PROCEDURE — 99214 OFFICE O/P EST MOD 30 MIN: CPT | Mod: S$GLB,,, | Performed by: PODIATRIST

## 2017-10-20 PROCEDURE — 99999 PR PBB SHADOW E&M-EST. PATIENT-LVL V: CPT | Mod: PBBFAC,,, | Performed by: PHYSICIAN ASSISTANT

## 2017-10-20 PROCEDURE — 99204 OFFICE O/P NEW MOD 45 MIN: CPT | Mod: S$GLB,,, | Performed by: PHYSICIAN ASSISTANT

## 2017-10-20 PROCEDURE — 99999 PR PBB SHADOW E&M-EST. PATIENT-LVL III: CPT | Mod: PBBFAC,,, | Performed by: PODIATRIST

## 2017-10-20 PROCEDURE — 72120 X-RAY BEND ONLY L-S SPINE: CPT | Mod: 26,,, | Performed by: RADIOLOGY

## 2017-10-20 PROCEDURE — 72100 X-RAY EXAM L-S SPINE 2/3 VWS: CPT | Mod: TC

## 2017-10-20 PROCEDURE — 72100 X-RAY EXAM L-S SPINE 2/3 VWS: CPT | Mod: 26,,, | Performed by: RADIOLOGY

## 2017-10-20 NOTE — PROGRESS NOTES
"Subjective:      Patient ID: Ibrahima Phelps is a 61 y.o. male.    Chief Complaint: PCP (Scott Miranda Jr., MD 2/22/17); Foot Problem (left foot pain ); and Heel Pain    Ibrahima is a 61 y.o. male who presents to the clinic complaining of heel pain in the left foot, especially with the first step in the morning. The pain is described as Dull, Tight and Deep. The onset of the pain was sudden and has worsened over the past several days.  He denies a history of trauma. Prior treatments include none.    Review of Systems   Constitution: Negative for chills, decreased appetite and fever.   Cardiovascular: Negative for leg swelling.   Musculoskeletal: Negative for arthritis, joint pain, joint swelling and myalgias.   Gastrointestinal: Negative for nausea and vomiting.   Neurological: Negative for loss of balance, numbness and paresthesias.           Objective:       Vitals:    10/20/17 0940   BP: (!) 156/80   Pulse: (!) 52   Resp: 18   Weight: 105.2 kg (232 lb)   Height: 6' 1" (1.854 m)   PainSc: 0-No pain        Physical Exam   Constitutional: He is oriented to person, place, and time. He appears well-developed and well-nourished.   Cardiovascular: Intact distal pulses.    dorsalis pedis and posterior tibial pulses are palpable bilaterally. Capillary refill time is within normal limits. + pedal hair growth          Musculoskeletal: Normal range of motion. He exhibits no edema or tenderness.   Adequate joint range of motion without pain, limitation, nor crepitation Bilateral feet and ankle joints. Muscle strength is 5/5 in all groups bilaterally.         Pain on palpation of L  plantar heel consistent with location of patient's chief complaint. Negative Tinel's sign. No pain with lateral compression of heels.     Neurological: He is alert and oriented to person, place, and time. He has normal strength. No sensory deficit.   Sorrento-Nikki 5.07 monofilament is intact bilateral feet.      Skin: Skin is warm, dry and " intact. No lesion and no rash noted. No erythema.   Psychiatric: He has a normal mood and affect. His behavior is normal.   Vitals reviewed.            Assessment:       Encounter Diagnosis   Name Primary?    Plantar fasciitis - Left Foot Yes         Plan:       Ibrahima was seen today for pcp, foot problem and heel pain.    Diagnoses and all orders for this visit:    Plantar fasciitis - Left Foot      I counseled the patient on his conditions, their implications and medical management.    Discussed different treatment options for heel pain. including conservative and interventional.  I gave written and verbal instructions on heel cord stretching and this was demonstrated for the patient. Patient expressed understanding. Discussed wearing appropriate shoe gear and avoiding flats, slippers, sandals, barefoot, and sockfeet. Recommended arch supports. My recommendation for OTC supports is Spenco polysorb replacement insoles or patient may elect more aggressive treatment with prescription arch supports. We also discussed cortisone injections and NSAID therapy.    Patient instructed on adequate icing techniques. Patient should ice the affected area at least once per day x 10 minutes for 10 days . I advised the  patient that extra icing would also be beneficial to ensure adequate anti inflammatory effect     Stretching handout dispensed to patient. Instructions on adequate stretching reviewed in clinic      Will defer steroid injection 2/2 pending spine appt today. He would benefit from a medrol dose mihir but will defer pending back evaluation

## 2017-10-20 NOTE — LETTER
October 20, 2017      Emre Latif MD  [address] [facility] [address]  [address]  [facility] [address]           [facility]  [address]  [place] [address]  Phone: [phone]          Patient: Ibrahima Phelps   MR Number: [id number]   YOB: 1955   Date of Visit: 10/20/2017       Dear Dr. Emre Latif:    Thank you for referring Ibrahima Phelps to me for evaluation. Attached you will find relevant portions of my assessment and plan of care.    If you have questions, please do not hesitate to call me. I look forward to following Ibrahima Phelps along with you.    Sincerely,    Rosemary Carrington DPM    Enclosure  CC:  No Recipients    If you would like to receive this communication electronically, please contact [email or web address] or [phone] to request more information on [employer] Link access.    For providers and/or their staff who would like to refer a patient to [facility], please contact us through our one-stop-shop provider referral line, [facility], at [phone].    If you feel you have received this communication in error or would no longer like to receive these types of communications, please e-mail [email or web address]

## 2017-10-20 NOTE — PROGRESS NOTES
"Subjective:      Patient ID: Ibrahima Phelps is a 61 y.o. male.    Chief Complaint: Low-back Pain      HPI     History of ESRD s/p kidney transplsant with resultant CKD, afib, CAD, COPD, HTN, obesity, and prediabetes. Also with history of chronic LBP and history of "2 herniated discs since the 1990s."    Chronic intermittent LBP x years with 2 day history of constant left sided LBP with left lateral leg pain to the knee. No right LBP or right leg pain. LBP > left leg pain. Pain is better with hot shower. Pain is worse with twisting, bending, moving from sitting to standing, and walking. He has intermittent tingling in left leg. No numbness or weakness. He rates his pain as a 5 on a scale of 1-10. Pain is sharp in nature. Recent pain started after he was pulling on some chains.     No NSAIDs per renal. He did PT years ago for his back- he thinks it helped. No ESIs or surgery on his back. He is on chronic prednisone. He is on COUMADIN.     Patient denies chills, night sweats, nausea, vomiting, and weight loss. Patient also denies bowel/bladder dysfunction, sexual dysfunction, and any saddle anesthesia. He felt feverish in last week but did not take temp.       Review of Systems   Constitution: Negative for fever, weakness, malaise/fatigue, night sweats, weight gain and weight loss.   HENT: Positive for tinnitus. Negative for hearing loss, nosebleeds and odynophagia.    Eyes: Negative for blurred vision and double vision.   Cardiovascular: Positive for irregular heartbeat. Negative for chest pain and palpitations.   Respiratory: Positive for shortness of breath. Negative for cough, hemoptysis and wheezing.    Endocrine: Negative for cold intolerance and polydipsia.   Hematologic/Lymphatic: Does not bruise/bleed easily.   Skin: Positive for dry skin. Negative for poor wound healing, rash and suspicious lesions.   Musculoskeletal: Positive for back pain.        See HPI for pertinent positives.   Gastrointestinal: Negative " for bloating, abdominal pain, constipation, diarrhea, hematochezia, melena, nausea and vomiting.   Genitourinary: Negative for bladder incontinence, dysuria, hematuria, hesitancy and incomplete emptying.   Neurological: Positive for paresthesias. Negative for disturbances in coordination, dizziness, focal weakness, headaches, loss of balance, numbness and seizures.   Psychiatric/Behavioral: Negative for depression and hallucinations. The patient is not nervous/anxious.            Objective:        General: Ibrahima is well-developed, well-nourished, appears stated age, in no acute distress, alert and oriented to time, place and person.     General    Vitals reviewed.  Constitutional: He is oriented to person, place, and time. He appears well-developed and well-nourished.   Pulmonary/Chest: Effort normal.   Abdominal: He exhibits no distension.   Neurological: He is alert and oriented to person, place, and time.   Psychiatric: He has a normal mood and affect. His behavior is normal. Judgment and thought content normal.           Gait: normal, tandem walking is normal and he is able to heel/toe stand. He has pain with getting up for seated position.     On exam of the lumbar spine, Inspection of back is normal, mild central lower lumbar tenderness.     Skin in lumbar region is warm to the touch without visible rashes.     muscle tone normal without spasm, limited range of motion with pain  Pain in flexion.    Strength testing of the bilateral LEs shows  Right hip abduction:  +5/5  Left hip abduction:  +5/5  Right hip flexion:  +5/5   Left hip flexion:  +5/5  Right hip extensors:  +5/5  Left hip extensors:  +5/5  Right quadriceps:  +5/5  Left quadriceps:  +5/5  Right hamstring:  +5/5  Left hamstring:  +5/5  Right dorsiflexion:  +5/5  Left dorsiflexion:  +5/5  Right plantar flexion:  +5/5  Left plantar flexion:  +5/5   Right EHL:  +5/5   Left EHL:  +5/5    negative clonus of bilateral LEs.     negative straight leg raise  on bilateral LEs.     DTRs:  Right patellar:  +2     Left patellar:  +2  Right achilles:  +2   Left achilles:  +2    Sensation is grossly intact in L2, L3, L4, L5, and S1 distribution.    Right hip has no pain with IR/ER. Left hip has no pain with IR/ER.     On exam of bilateral UEs, patient has full painfree ROM with no signs of clubbing, laxity, cyanosis, edema, instability, weakness, or tenderness.       XRAY INTERPRETATION:  X-rays of lumbar spine (AP/lat/flex/ext) dated 10/20/17 are personally reviewed and show facet hypertrophy L4-S1 with DDD L5-S1.        Assessment:       1. Spondylosis without myelopathy    2. DDD (degenerative disc disease), lumbosacral    3. Thoracic and lumbosacral neuritis    4. Chronic left-sided low back pain with left-sided sciatica           Plan:       Orders Placed This Encounter    Ambulatory referral to Physical Therapy - Lumbar       Chronic intermittent LBP x years with 2 day history of constant left sided LBP with left lateral leg pain to the knee. No right LBP or right leg pain. LBP > left leg pain. Known facet hypertrophy L4-S1 with DDD L5-S1. Pain likely due to underlying degeneration. Treatment options reviewed with patient along with above lumbar XRs. Following plan made:     - Medications limited by renal transplant/ESRD and COUMADIN. No NSAIDs. He is on chronic prednisone.   - Message sent to renal (Bhavya)to see if we can start neurontin-  Can do either 300mg or 100mg. Will send him message once I hear back.   - PT for lumbar spine with good HEP. External script given.   - If no improvement with above, consider lumbar MRI and possible injections. He would need clearance to stop COUMADIN for any procedures.     Follow-up: Return in about 2 months (around 12/20/2017). If there are any questions prior to this, the patient was instructed to contact the office.

## 2017-10-20 NOTE — TELEPHONE ENCOUNTER
----- Message from Adilia Fernandez PA-C sent at 10/20/2017 11:29 AM CDT -----  I am seeing him for his back pain. Can he take neurontin from your standpoint?  I usually do 300mg tid, but can do 100mg as well.     Thank you,     Adilia Fernandez PA-C  [facility]

## 2017-10-20 NOTE — LETTER
October 20, 2017      Emre Latif MD  [address] [facility] [address]  [address]  [facility] [address]           [facility]  [address], [address]  [facility] [address]  Phone: [phone]  Fax: [phone]          Patient: Ibrahima Phelps   MR Number: [id number]   YOB: 1955   Date of Visit: 10/20/2017       Dear Dr. Emre Latif:    Thank you for referring Ibrahima Phelps to me for evaluation. Attached you will find relevant portions of my assessment and plan of care.    If you have questions, please do not hesitate to call me. I look forward to following Ibrahima Phelps along with you.    Sincerely,    Adilia Fernandez PA-C    Enclosure  CC:  No Recipients    If you would like to receive this communication electronically, please contact [email or web address] or [phone] to request more information on [employer] Link access.    For providers and/or their staff who would like to refer a patient to [facility], please contact us through our one-stop-shop provider referral line, [facility], at [phone].    If you feel you have received this communication in error or would no longer like to receive these types of communications, please e-mail [email or web address]

## 2017-10-23 RX ORDER — GABAPENTIN 300 MG/1
CAPSULE ORAL
Qty: 90 CAPSULE | Refills: 2 | Status: SHIPPED | OUTPATIENT
Start: 2017-10-23 | End: 2018-01-16 | Stop reason: SDUPTHER

## 2017-10-23 RX ORDER — ATORVASTATIN CALCIUM 10 MG/1
10 TABLET, FILM COATED ORAL DAILY
Qty: 90 TABLET | Refills: 3 | Status: SHIPPED | OUTPATIENT
Start: 2017-10-23 | End: 2018-10-04 | Stop reason: SDUPTHER

## 2017-10-24 RX ORDER — IBUPROFEN 200 MG
TABLET ORAL
Qty: 28 PATCH | Refills: 6 | Status: SHIPPED | OUTPATIENT
Start: 2017-10-24 | End: 2018-11-12

## 2017-10-27 ENCOUNTER — TELEPHONE (OUTPATIENT)
Dept: NEPHROLOGY | Facility: CLINIC | Age: 62
End: 2017-10-27

## 2017-11-02 RX ORDER — DOXAZOSIN 4 MG/1
6 TABLET ORAL 2 TIMES DAILY
Qty: 270 TABLET | Refills: 3 | Status: SHIPPED | OUTPATIENT
Start: 2017-11-02 | End: 2018-01-31

## 2017-11-03 ENCOUNTER — ANTI-COAG VISIT (OUTPATIENT)
Dept: CARDIOLOGY | Facility: CLINIC | Age: 62
End: 2017-11-03
Payer: COMMERCIAL

## 2017-11-03 DIAGNOSIS — Z79.01 LONG-TERM (CURRENT) USE OF ANTICOAGULANTS: Primary | ICD-10-CM

## 2017-11-03 LAB — INR PPP: 2.4 (ref 2–3)

## 2017-11-03 PROCEDURE — 85610 PROTHROMBIN TIME: CPT | Mod: QW,S$GLB,,

## 2017-11-06 ENCOUNTER — TELEPHONE (OUTPATIENT)
Dept: INTERNAL MEDICINE | Facility: CLINIC | Age: 62
End: 2017-11-06

## 2017-11-06 NOTE — TELEPHONE ENCOUNTER
----- Message from Michelle Cooper MA sent at 11/6/2017 10:09 AM CST -----  Patient is request to have [employer] paper filed out what's your fax number?

## 2017-11-13 ENCOUNTER — TELEPHONE (OUTPATIENT)
Dept: INTERNAL MEDICINE | Facility: CLINIC | Age: 62
End: 2017-11-13

## 2017-11-13 NOTE — TELEPHONE ENCOUNTER
----- Message from Zahra Camilo sent at 11/13/2017 10:05 AM CST -----  Contact: self/[phone]  Pt called in regard to checking the status of his back and spine paper work is done. The paper work needs to go back to [place].      Please advise

## 2017-11-14 NOTE — TELEPHONE ENCOUNTER
----- Message from Eduar Rutherford sent at 11/14/2017  8:58 AM CST -----  Contact: self/ [phone] cell  Type: Returning a call    Who left a message? Lynn    When did the practice call? 11/13/2017    Comments: Pt returned call and is waiting on a call back.  He would like a call back on the cell number above.  Please call and advise.    Thank you

## 2017-11-16 ENCOUNTER — TELEPHONE (OUTPATIENT)
Dept: SPINE | Facility: CLINIC | Age: 62
End: 2017-11-16

## 2017-11-16 NOTE — TELEPHONE ENCOUNTER
Patient will need to discuss this with work. I do not take patients out of work to do PT. Some PT facilities are open later and on weekends- that may be an option for him.

## 2017-11-16 NOTE — TELEPHONE ENCOUNTER
Called spoke with patient he stated that he been out off work since 10/20.During his office visit he stated that Adilia wanted him to do PT but he explain to her that he is a  and be on the road all the time and in order for him to complete therapy and help his back feel better he would have to be out of work he would not be able to drive for work and complete the necessary therapy that he needs please advise.  ----- Message from Carter Newell sent at 11/16/2017 11:01 AM CST -----  Contact: Ibrahima Phelps  X_  1st Request  _  2nd Request  _  3rd Request        Who: Ibrahima Phelps    Why: Patient returning phone call about [employer] paperwork    What Number to Call Back: [phone]    When to Expect a call back: (Within 24 hours)    Please return the call at earliest convenience. Thanks!

## 2017-11-27 ENCOUNTER — TELEPHONE (OUTPATIENT)
Dept: SPINE | Facility: CLINIC | Age: 62
End: 2017-11-27

## 2017-11-27 NOTE — TELEPHONE ENCOUNTER
Done.patient is requesting to speak with you.  ----- Message from Adilia Fernandez PA-C sent at 11/27/2017 12:04 PM CST -----  I received disability paperwork for him. As we discussed previously, I did not take him out of work and will not do any paperwork/restrictions. Please make sure he understands this.     Thanks.

## 2017-12-13 LAB
AORTIC VALVE REGURGITATION: NORMAL
DIASTOLIC DYSFUNCTION: NO
ESTIMATED PA SYSTOLIC PRESSURE: 39.24
RETIRED EF AND QEF - SEE NOTES: 55 (ref 55–65)
TRICUSPID VALVE REGURGITATION: NORMAL

## 2017-12-13 NOTE — PROGRESS NOTES
"Subjective:      Patient ID: Ibrahima Phelps is a 62 y.o. male.    Chief Complaint: Follow-up (8 weeks)      HPI  ([place])    History of ESRD s/p kidney transplsant with resultant CKD, afib, CAD, COPD, HTN, obesity, and prediabetes. Also with history of chronic LBP and history of "2 herniated discs since the 1990s."    Known facet hypertrophy L4-S1 with DDD L5-S1. Pain likely due to underlying degeneration. Sent to PT at his last visit. Medications limited by renal transplant/ESRD and COUMADIN. No NSAIDs. He is on chronic prednisone. Started on neurontin at last visit after getting clearance from renal ([place]). He is on COUMADIN.     He went to PT and had great relief of his pain with this and the neurontin. Currently he has no pain. He rates his pain as a 0 on a scale of 1-10. 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 He has not been at work since his last visit as he stayed out to go to PT. He needs a note releasing him back to work. He has intermittent twinges of back and left leg pain, but nothing severe. He has neurontin to take at night if needed. He asks for a refill of robaxin to have on hand as well.       Review of Systems   Constitution: Negative for chills, fever, night sweats and weight gain.   Gastrointestinal: Negative for bowel incontinence, nausea and vomiting.   Genitourinary: Negative for bladder incontinence.   Neurological: Negative for disturbances in coordination and loss of balance.           Objective:        General: Ibrahima is well-developed, well-nourished, appears stated age, in no acute distress, alert and oriented to time, place and person.     Ortho/SPM Exam     Patient sits comfortably in the exam room and answers questions appropriately. Grossly patient is able to move bilateral LEs without difficulty. Ambulates normally.         Assessment:       1. Spondylosis without myelopathy    2. DDD (degenerative disc disease), lumbosacral    3. Thoracic and " lumbosacral neuritis    4. Chronic left-sided low back pain with left-sided sciatica           Plan:       Orders Placed This Encounter    methocarbamol (ROBAXIN) 500 MG Tab       Improvement in LBP and left leg pain with PT and neurontin. Currently with no pain. Known facet hypertrophy L4-S1 with DDD L5-S1. Treatment options reviewed with patient along with and following plan made:     - Medications limited by renal transplant/ESRD and COUMADIN. No NSAIDs. He is on chronic prednisone.   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 Refill given on robaxin.   - Continue HEP from PT.   - Given note saying he has no work restrictions from my standpoint.   - If pain returns or becomes severe then, consider lumbar MRI and possible injections. He would need clearance to stop COUMADIN for any procedures.     Follow-up: Return if symptoms worsen or fail to improve. If there are any questions prior to this, the patient was instructed to contact the office.

## 2017-12-15 ENCOUNTER — OFFICE VISIT (OUTPATIENT)
Dept: SPINE | Facility: CLINIC | Age: 62
End: 2017-12-15
Payer: COMMERCIAL

## 2017-12-15 ENCOUNTER — ANTI-COAG VISIT (OUTPATIENT)
Dept: CARDIOLOGY | Facility: CLINIC | Age: 62
End: 2017-12-15
Payer: COMMERCIAL

## 2017-12-15 VITALS
HEART RATE: 73 BPM | WEIGHT: 232 LBS | SYSTOLIC BLOOD PRESSURE: 130 MMHG | HEIGHT: 73 IN | DIASTOLIC BLOOD PRESSURE: 57 MMHG | BODY MASS INDEX: 30.75 KG/M2

## 2017-12-15 DIAGNOSIS — M54.42 CHRONIC LEFT-SIDED LOW BACK PAIN WITH LEFT-SIDED SCIATICA: ICD-10-CM

## 2017-12-15 DIAGNOSIS — Z79.01 LONG-TERM (CURRENT) USE OF ANTICOAGULANTS: ICD-10-CM

## 2017-12-15 DIAGNOSIS — Z79.01 LONG TERM (CURRENT) USE OF ANTICOAGULANTS: Primary | ICD-10-CM

## 2017-12-15 DIAGNOSIS — M54.14 THORACIC AND LUMBOSACRAL NEURITIS: ICD-10-CM

## 2017-12-15 DIAGNOSIS — M51.37 DDD (DEGENERATIVE DISC DISEASE), LUMBOSACRAL: ICD-10-CM

## 2017-12-15 DIAGNOSIS — G89.29 CHRONIC LEFT-SIDED LOW BACK PAIN WITH LEFT-SIDED SCIATICA: ICD-10-CM

## 2017-12-15 DIAGNOSIS — M54.17 THORACIC AND LUMBOSACRAL NEURITIS: ICD-10-CM

## 2017-12-15 DIAGNOSIS — M47.819 SPONDYLOSIS WITHOUT MYELOPATHY: Primary | ICD-10-CM

## 2017-12-15 LAB — INR PPP: 2.5 (ref 2–3)

## 2017-12-15 PROCEDURE — 99999 PR PBB SHADOW E&M-EST. PATIENT-LVL IV: CPT | Mod: PBBFAC,,, | Performed by: PHYSICIAN ASSISTANT

## 2017-12-15 PROCEDURE — 99213 OFFICE O/P EST LOW 20 MIN: CPT | Mod: S$GLB,,, | Performed by: PHYSICIAN ASSISTANT

## 2017-12-15 PROCEDURE — 85610 PROTHROMBIN TIME: CPT | Mod: QW,S$GLB,,

## 2017-12-15 RX ORDER — METHOCARBAMOL 500 MG/1
500 TABLET, FILM COATED ORAL 3 TIMES DAILY PRN
Qty: 90 TABLET | Refills: 0 | Status: SHIPPED | OUTPATIENT
Start: 2017-12-15 | End: 2018-01-14

## 2017-12-20 RX ORDER — METOPROLOL TARTRATE 50 MG/1
50 TABLET ORAL 2 TIMES DAILY
Qty: 180 TABLET | Refills: 3 | Status: SHIPPED | OUTPATIENT
Start: 2017-12-20 | End: 2018-11-27 | Stop reason: SDUPTHER

## 2017-12-20 RX ORDER — SPIRONOLACTONE 25 MG/1
25 TABLET ORAL DAILY
Qty: 90 TABLET | Refills: 3 | Status: SHIPPED | OUTPATIENT
Start: 2017-12-20 | End: 2018-11-27 | Stop reason: SDUPTHER

## 2018-01-15 RX ORDER — NIFEDIPINE 60 MG/1
TABLET, EXTENDED RELEASE ORAL
Qty: 180 TABLET | Refills: 3 | Status: SHIPPED | OUTPATIENT
Start: 2018-01-15 | End: 2019-01-25 | Stop reason: SDUPTHER

## 2018-01-16 DIAGNOSIS — M54.14 THORACIC AND LUMBOSACRAL NEURITIS: ICD-10-CM

## 2018-01-16 DIAGNOSIS — M54.17 THORACIC AND LUMBOSACRAL NEURITIS: ICD-10-CM

## 2018-01-16 DIAGNOSIS — M51.37 DDD (DEGENERATIVE DISC DISEASE), LUMBOSACRAL: ICD-10-CM

## 2018-01-16 DIAGNOSIS — M54.42 RIGHT-SIDED LOW BACK PAIN WITH LEFT-SIDED SCIATICA, UNSPECIFIED CHRONICITY: ICD-10-CM

## 2018-01-16 DIAGNOSIS — M47.819 SPONDYLOSIS WITHOUT MYELOPATHY: ICD-10-CM

## 2018-01-16 RX ORDER — GABAPENTIN 300 MG/1
CAPSULE ORAL
Qty: 90 CAPSULE | Refills: 2 | Status: SHIPPED | OUTPATIENT
Start: 2018-01-16 | End: 2020-02-03 | Stop reason: SDUPTHER

## 2018-01-17 ENCOUNTER — TELEPHONE (OUTPATIENT)
Dept: NEPHROLOGY | Facility: CLINIC | Age: 63
End: 2018-01-17

## 2018-01-17 RX ORDER — FUROSEMIDE 40 MG/1
20 TABLET ORAL 2 TIMES DAILY
Qty: 180 TABLET | Refills: 1 | Status: SHIPPED | OUTPATIENT
Start: 2018-01-17 | End: 2019-02-24 | Stop reason: SDUPTHER

## 2018-01-19 RX ORDER — HYDROGEN PEROXIDE 3 %
20 SOLUTION, NON-ORAL MISCELLANEOUS
Qty: 30 CAPSULE | Refills: 4 | Status: SHIPPED | OUTPATIENT
Start: 2018-01-19 | End: 2018-02-12 | Stop reason: ALTCHOICE

## 2018-01-19 NOTE — TELEPHONE ENCOUNTER
Alternatice requested for pantoprazole from [facility] pharmacy. Insurance prefers, nexium, prilosec or prevacid.

## 2018-01-22 ENCOUNTER — PATIENT MESSAGE (OUTPATIENT)
Dept: NEPHROLOGY | Facility: CLINIC | Age: 63
End: 2018-01-22

## 2018-02-05 ENCOUNTER — PATIENT MESSAGE (OUTPATIENT)
Dept: CARDIOLOGY | Facility: CLINIC | Age: 63
End: 2018-02-05

## 2018-02-09 ENCOUNTER — ANTI-COAG VISIT (OUTPATIENT)
Dept: CARDIOLOGY | Facility: CLINIC | Age: 63
End: 2018-02-09

## 2018-02-09 ENCOUNTER — LAB VISIT (OUTPATIENT)
Dept: LAB | Facility: HOSPITAL | Age: 63
End: 2018-02-09
Attending: INTERNAL MEDICINE
Payer: COMMERCIAL

## 2018-02-09 DIAGNOSIS — Z79.01 LONG TERM (CURRENT) USE OF ANTICOAGULANTS: ICD-10-CM

## 2018-02-09 DIAGNOSIS — N17.9 AKI (ACUTE KIDNEY INJURY): ICD-10-CM

## 2018-02-09 LAB
ANION GAP SERPL CALC-SCNC: 10 MMOL/L
BUN SERPL-MCNC: 25 MG/DL
CALCIUM SERPL-MCNC: 7 MG/DL
CHLORIDE SERPL-SCNC: 108 MMOL/L
CO2 SERPL-SCNC: 24 MMOL/L
CREAT SERPL-MCNC: 1.8 MG/DL
EST. GFR  (AFRICAN AMERICAN): 45.3 ML/MIN/1.73 M^2
EST. GFR  (NON AFRICAN AMERICAN): 39.2 ML/MIN/1.73 M^2
GLUCOSE SERPL-MCNC: 97 MG/DL
INR PPP: 2.9
POTASSIUM SERPL-SCNC: 3.8 MMOL/L
PROTHROMBIN TIME: 28.2 SEC
SODIUM SERPL-SCNC: 142 MMOL/L

## 2018-02-09 PROCEDURE — 80048 BASIC METABOLIC PNL TOTAL CA: CPT

## 2018-02-09 PROCEDURE — 85610 PROTHROMBIN TIME: CPT

## 2018-02-09 PROCEDURE — 36415 COLL VENOUS BLD VENIPUNCTURE: CPT

## 2018-02-12 ENCOUNTER — TELEPHONE (OUTPATIENT)
Dept: NEPHROLOGY | Facility: CLINIC | Age: 63
End: 2018-02-12

## 2018-02-12 ENCOUNTER — OFFICE VISIT (OUTPATIENT)
Dept: NEPHROLOGY | Facility: CLINIC | Age: 63
End: 2018-02-12
Payer: COMMERCIAL

## 2018-02-12 VITALS
DIASTOLIC BLOOD PRESSURE: 76 MMHG | OXYGEN SATURATION: 100 % | HEART RATE: 52 BPM | SYSTOLIC BLOOD PRESSURE: 138 MMHG | BODY MASS INDEX: 31.54 KG/M2 | WEIGHT: 238 LBS | HEIGHT: 73 IN

## 2018-02-12 DIAGNOSIS — R73.03 PREDIABETES: ICD-10-CM

## 2018-02-12 DIAGNOSIS — Z72.0 TOBACCO USE: ICD-10-CM

## 2018-02-12 DIAGNOSIS — D84.821 IMMUNODEFICIENCY DUE TO TREATMENT WITH IMMUNOSUPPRESSIVE MEDICATION: ICD-10-CM

## 2018-02-12 DIAGNOSIS — Z94.0 H/O KIDNEY TRANSPLANT: ICD-10-CM

## 2018-02-12 DIAGNOSIS — Z94.0 KIDNEY TRANSPLANT RECIPIENT: Primary | ICD-10-CM

## 2018-02-12 DIAGNOSIS — I25.10 CORONARY ARTERY DISEASE INVOLVING NATIVE CORONARY ARTERY OF NATIVE HEART WITHOUT ANGINA PECTORIS: ICD-10-CM

## 2018-02-12 DIAGNOSIS — N18.30 CKD (CHRONIC KIDNEY DISEASE) STAGE 3, GFR 30-59 ML/MIN: ICD-10-CM

## 2018-02-12 DIAGNOSIS — E66.9 OBESITY (BMI 30-39.9): ICD-10-CM

## 2018-02-12 DIAGNOSIS — J43.8 OTHER EMPHYSEMA: ICD-10-CM

## 2018-02-12 DIAGNOSIS — N25.81 SECONDARY RENAL HYPERPARATHYROIDISM: ICD-10-CM

## 2018-02-12 DIAGNOSIS — I48.91 ATRIAL FIBRILLATION, UNSPECIFIED TYPE: ICD-10-CM

## 2018-02-12 DIAGNOSIS — I50.30 (HFPEF) HEART FAILURE WITH PRESERVED EJECTION FRACTION: ICD-10-CM

## 2018-02-12 DIAGNOSIS — Z79.899 IMMUNODEFICIENCY DUE TO TREATMENT WITH IMMUNOSUPPRESSIVE MEDICATION: ICD-10-CM

## 2018-02-12 DIAGNOSIS — I10 ESSENTIAL HYPERTENSION: ICD-10-CM

## 2018-02-12 PROCEDURE — 99214 OFFICE O/P EST MOD 30 MIN: CPT | Mod: S$GLB,,, | Performed by: INTERNAL MEDICINE

## 2018-02-12 PROCEDURE — 3008F BODY MASS INDEX DOCD: CPT | Mod: S$GLB,,, | Performed by: INTERNAL MEDICINE

## 2018-02-12 PROCEDURE — 99999 PR PBB SHADOW E&M-EST. PATIENT-LVL III: CPT | Mod: PBBFAC,,, | Performed by: INTERNAL MEDICINE

## 2018-02-25 NOTE — PROGRESS NOTES
Subjective:       Patient ID: Ibrahima Phelps is a 62 y.o. Black or  male who presents for follow-up evaluation of Chronic Kidney Disease and Kidney Transplant    HPI     He saw back and spine, is using gabapentin with some relief. He also reports flu like symptoms for 4-5 days, feels fine now. He continues to smoke a few cigarettes a day. Off PPI for several days and without dyspepsia. No LE edema and no SOB. He feels he stays hydrated but admits again that he is not very adherent with calcium. No allograft pain and no problems with urination    Review of Systems   Constitutional: Negative for activity change, appetite change, fatigue, fever and unexpected weight change.   HENT: Negative for facial swelling.    Respiratory: Negative for shortness of breath.    Cardiovascular: Negative for chest pain and leg swelling.   Gastrointestinal: Negative for abdominal pain.   Genitourinary: Negative for difficulty urinating, dysuria, frequency and hematuria.   Musculoskeletal: Negative for arthralgias.   Neurological: Negative for weakness and headaches.   Hematological: Does not bruise/bleed easily.   Psychiatric/Behavioral: Negative for decreased concentration.       Objective:      Physical Exam   Constitutional: He is oriented to person, place, and time. He appears well-developed and well-nourished.   Neck: No JVD present.   Cardiovascular: S1 normal and S2 normal.  Exam reveals no friction rub.    Pulmonary/Chest: He has wheezes. He has no rales.   Abdominal: Soft.   Musculoskeletal: He exhibits no edema.   Neurological: He is alert and oriented to person, place, and time.   Skin: Skin is warm and dry.   Psychiatric: He has a normal mood and affect.   Vitals reviewed.      Assessment:       1. Kidney transplant recipient    2. CKD (chronic kidney disease) stage 3, GFR 30-59 ml/min    3. H/O kidney transplant    4. Immunodeficiency due to treatment with immunosuppressive medication    5. Essential  hypertension    6. Coronary artery disease involving native coronary artery of native heart without angina pectoris    7. Other emphysema    8. Atrial fibrillation, unspecified type    9. (HFpEF) heart failure with preserved ejection fraction    10. Secondary renal hyperparathyroidism    11. Prediabetes    12. Obesity (BMI 30-39.9)    13. Tobacco use        Plan:             ESRD s/p kidney transplsant with resultant CKD.     HTN is controlled    PreDiabetes is better    Mineral and Bone disease--continue current treatment. Discussed tricks to remember calcium    Back pain--no NSAIDs, OK for gabapentin        RTC 4 months with labs prior

## 2018-03-05 ENCOUNTER — PATIENT MESSAGE (OUTPATIENT)
Dept: NEPHROLOGY | Facility: CLINIC | Age: 63
End: 2018-03-05

## 2018-03-05 ENCOUNTER — LAB VISIT (OUTPATIENT)
Dept: LAB | Facility: HOSPITAL | Age: 63
End: 2018-03-05
Attending: INTERNAL MEDICINE
Payer: COMMERCIAL

## 2018-03-05 DIAGNOSIS — Z94.0 KIDNEY TRANSPLANT RECIPIENT: ICD-10-CM

## 2018-03-05 LAB
ALBUMIN SERPL BCP-MCNC: 3.4 G/DL
ALBUMIN SERPL BCP-MCNC: 3.4 G/DL
ALP SERPL-CCNC: 78 U/L
ALT SERPL W/O P-5'-P-CCNC: 20 U/L
ANION GAP SERPL CALC-SCNC: 8 MMOL/L
AST SERPL-CCNC: 23 U/L
BILIRUB DIRECT SERPL-MCNC: 0.1 MG/DL
BILIRUB SERPL-MCNC: 0.3 MG/DL
BUN SERPL-MCNC: 17 MG/DL
CALCIUM SERPL-MCNC: 8 MG/DL
CHLORIDE SERPL-SCNC: 106 MMOL/L
CO2 SERPL-SCNC: 27 MMOL/L
CREAT SERPL-MCNC: 1.7 MG/DL
EST. GFR  (AFRICAN AMERICAN): 48.5 ML/MIN/1.73 M^2
EST. GFR  (NON AFRICAN AMERICAN): 42 ML/MIN/1.73 M^2
GLUCOSE SERPL-MCNC: 111 MG/DL
PHOSPHATE SERPL-MCNC: 3.4 MG/DL
POTASSIUM SERPL-SCNC: 4.1 MMOL/L
PROT SERPL-MCNC: 6.9 G/DL
PTH-INTACT SERPL-MCNC: 75 PG/ML
SODIUM SERPL-SCNC: 141 MMOL/L
TACROLIMUS BLD-MCNC: 6.1 NG/ML

## 2018-03-05 PROCEDURE — 84075 ASSAY ALKALINE PHOSPHATASE: CPT

## 2018-03-05 PROCEDURE — 83970 ASSAY OF PARATHORMONE: CPT

## 2018-03-05 PROCEDURE — 80197 ASSAY OF TACROLIMUS: CPT

## 2018-03-05 PROCEDURE — 80069 RENAL FUNCTION PANEL: CPT

## 2018-03-05 PROCEDURE — 82247 BILIRUBIN TOTAL: CPT

## 2018-03-05 PROCEDURE — 36415 COLL VENOUS BLD VENIPUNCTURE: CPT

## 2018-03-12 ENCOUNTER — OFFICE VISIT (OUTPATIENT)
Dept: INTERNAL MEDICINE | Facility: CLINIC | Age: 63
End: 2018-03-12
Payer: COMMERCIAL

## 2018-03-12 VITALS
WEIGHT: 236.31 LBS | DIASTOLIC BLOOD PRESSURE: 78 MMHG | HEIGHT: 73 IN | OXYGEN SATURATION: 99 % | HEART RATE: 62 BPM | BODY MASS INDEX: 31.32 KG/M2 | SYSTOLIC BLOOD PRESSURE: 130 MMHG

## 2018-03-12 DIAGNOSIS — I48.91 ATRIAL FIBRILLATION, UNSPECIFIED TYPE: ICD-10-CM

## 2018-03-12 DIAGNOSIS — N18.30 CKD (CHRONIC KIDNEY DISEASE) STAGE 3, GFR 30-59 ML/MIN: ICD-10-CM

## 2018-03-12 DIAGNOSIS — Z72.0 TOBACCO USE: Primary | ICD-10-CM

## 2018-03-12 DIAGNOSIS — I10 ESSENTIAL HYPERTENSION: ICD-10-CM

## 2018-03-12 PROCEDURE — 99999 PR PBB SHADOW E&M-EST. PATIENT-LVL III: CPT | Mod: PBBFAC,,, | Performed by: INTERNAL MEDICINE

## 2018-03-12 PROCEDURE — 90471 IMMUNIZATION ADMIN: CPT | Mod: 59,S$GLB,, | Performed by: INTERNAL MEDICINE

## 2018-03-12 PROCEDURE — 90715 TDAP VACCINE 7 YRS/> IM: CPT | Mod: S$GLB,,, | Performed by: INTERNAL MEDICINE

## 2018-03-12 PROCEDURE — 3075F SYST BP GE 130 - 139MM HG: CPT | Mod: CPTII,S$GLB,, | Performed by: INTERNAL MEDICINE

## 2018-03-12 PROCEDURE — 3078F DIAST BP <80 MM HG: CPT | Mod: CPTII,S$GLB,, | Performed by: INTERNAL MEDICINE

## 2018-03-12 PROCEDURE — 99214 OFFICE O/P EST MOD 30 MIN: CPT | Mod: 25,S$GLB,, | Performed by: INTERNAL MEDICINE

## 2018-03-12 NOTE — PROGRESS NOTES
Subjective:       Patient ID: Ibrahima Phelps is a 62 y.o. male.    Chief Complaint: Follow-up    HPI the patient is a 62-year-old male comes in for an annual checkup.  He reports blood pressures at home under good control around 130/70.  He is not having any chest pain.  He reports his shortness of breath has improved.  He is now smoking a half a pack per day and is using and NicoDerm patch.  I discussed the smoking cessation clinic, but he wishes to defer this at this time.  The patient is not exercising but has recently retired.  He does stay fairly active doing work around his home.  Review of Systems   Constitutional: Negative for activity change, appetite change, fatigue and unexpected weight change.   HENT: Negative for congestion, hearing loss, rhinorrhea, sore throat and trouble swallowing.    Eyes: Negative for discharge and visual disturbance.   Respiratory: Negative for cough, chest tightness, shortness of breath and wheezing.    Cardiovascular: Negative for chest pain and palpitations.   Gastrointestinal: Negative for abdominal distention, abdominal pain, blood in stool, constipation, diarrhea, nausea and vomiting.   Endocrine: Negative for polydipsia and polyuria.   Genitourinary: Negative for difficulty urinating, dysuria, frequency, hematuria and urgency.   Musculoskeletal: Negative for arthralgias, joint swelling, myalgias, neck pain and neck stiffness.   Skin: Negative for rash.   Neurological: Negative for dizziness, weakness and headaches.   Psychiatric/Behavioral: Negative for behavioral problems, confusion, dysphoric mood, hallucinations and self-injury.       Objective:      Physical Exam   Constitutional: He is oriented to person, place, and time. He appears well-developed. No distress.   HENT:   Head: Normocephalic.   Mouth/Throat: No oropharyngeal exudate.   Eyes: Conjunctivae and EOM are normal. Pupils are equal, round, and reactive to light. Right eye exhibits no discharge. Left eye exhibits  no discharge. No scleral icterus.   Fundi benign bilaterally.   Neck: Normal range of motion. No JVD present. No tracheal deviation present. No thyromegaly present.   Cardiovascular: Normal rate, regular rhythm and normal heart sounds.  Exam reveals no gallop and no friction rub.    No murmur heard.  Pulmonary/Chest: Effort normal and breath sounds normal. No respiratory distress. He has no wheezes. He has no rales. He exhibits no tenderness.   Abdominal: Soft. Bowel sounds are normal. He exhibits no distension and no mass. There is no tenderness. There is no rebound and no guarding.   Musculoskeletal: Normal range of motion. He exhibits no edema or tenderness.   Lymphadenopathy:     He has no cervical adenopathy.   Neurological: He is alert and oriented to person, place, and time. He has normal reflexes. He displays normal reflexes. No cranial nerve deficit. He exhibits normal muscle tone. Coordination normal.   Skin: Skin is warm and dry. No rash noted. He is not diaphoretic. No erythema. No pallor.   Psychiatric: He has a normal mood and affect. His behavior is normal. Thought content normal.       Assessment:       1. Tobacco use    2. CKD (chronic kidney disease) stage 3, GFR 30-59 ml/min    3. Essential hypertension    4. Atrial fibrillation, unspecified type        Plan:       1.  Adacel vaccine  2.  Return to clinic in 6 months  3.  Increase physical activity.  4.  Discontinue cigarettes

## 2018-03-14 ENCOUNTER — ANTI-COAG VISIT (OUTPATIENT)
Dept: CARDIOLOGY | Facility: CLINIC | Age: 63
End: 2018-03-14
Payer: COMMERCIAL

## 2018-03-14 DIAGNOSIS — Z79.01 LONG TERM (CURRENT) USE OF ANTICOAGULANTS: Primary | ICD-10-CM

## 2018-03-14 LAB — INR PPP: 2.5 (ref 2–3)

## 2018-03-14 PROCEDURE — 85610 PROTHROMBIN TIME: CPT | Mod: QW,S$GLB,, | Performed by: PHARMACIST

## 2018-03-14 RX ORDER — IBUPROFEN 200 MG
TABLET ORAL
Qty: 28 PATCH | Refills: 4 | Status: SHIPPED | OUTPATIENT
Start: 2018-03-14 | End: 2018-06-04 | Stop reason: SDUPTHER

## 2018-03-14 NOTE — PROGRESS NOTES
INR within normal range today. Patient with bruise on arm from use. Denies any bleeding or other changes. Patient is stable on this dose. He will continue this dose until follow-up in 5 weeks. I advised him to contact us with any changes or problems.

## 2018-03-14 NOTE — PROGRESS NOTES
Pt seen by Mary SHARP. I have reviewed her documentation and agree with her assessment and plan.

## 2018-04-18 ENCOUNTER — ANTI-COAG VISIT (OUTPATIENT)
Dept: CARDIOLOGY | Facility: CLINIC | Age: 63
End: 2018-04-18
Payer: COMMERCIAL

## 2018-04-18 DIAGNOSIS — Z79.01 LONG TERM (CURRENT) USE OF ANTICOAGULANTS: Primary | ICD-10-CM

## 2018-04-18 LAB — INR PPP: 2 (ref 2–3)

## 2018-04-18 PROCEDURE — 85610 PROTHROMBIN TIME: CPT | Mod: QW,S$GLB,, | Performed by: PHARMACIST

## 2018-04-20 ENCOUNTER — TELEPHONE (OUTPATIENT)
Dept: TRANSPLANT | Facility: CLINIC | Age: 63
End: 2018-04-20

## 2018-04-20 NOTE — TELEPHONE ENCOUNTER
Left voicemail message for pt to return call to schedule annual transplant labs and clinic apt due in April.

## 2018-04-25 DIAGNOSIS — Z94.0 KIDNEY REPLACED BY TRANSPLANT: Primary | ICD-10-CM

## 2018-04-30 ENCOUNTER — LAB VISIT (OUTPATIENT)
Dept: LAB | Facility: HOSPITAL | Age: 63
End: 2018-04-30
Payer: COMMERCIAL

## 2018-04-30 DIAGNOSIS — Z94.0 KIDNEY REPLACED BY TRANSPLANT: ICD-10-CM

## 2018-04-30 LAB
ALBUMIN SERPL BCP-MCNC: 3.4 G/DL
ALBUMIN SERPL BCP-MCNC: 3.4 G/DL
ALP SERPL-CCNC: 82 U/L
ALT SERPL W/O P-5'-P-CCNC: 19 U/L
ANION GAP SERPL CALC-SCNC: 8 MMOL/L
AST SERPL-CCNC: 22 U/L
BASOPHILS # BLD AUTO: 0.02 K/UL
BASOPHILS NFR BLD: 0.3 %
BILIRUB DIRECT SERPL-MCNC: 0.1 MG/DL
BILIRUB SERPL-MCNC: 0.4 MG/DL
BUN SERPL-MCNC: 17 MG/DL
CALCIUM SERPL-MCNC: 7.7 MG/DL
CHLORIDE SERPL-SCNC: 109 MMOL/L
CO2 SERPL-SCNC: 25 MMOL/L
CREAT SERPL-MCNC: 1.6 MG/DL
DIFFERENTIAL METHOD: ABNORMAL
EOSINOPHIL # BLD AUTO: 0.3 K/UL
EOSINOPHIL NFR BLD: 4.2 %
ERYTHROCYTE [DISTWIDTH] IN BLOOD BY AUTOMATED COUNT: 14.8 %
EST. GFR  (AFRICAN AMERICAN): 52.2 ML/MIN/1.73 M^2
EST. GFR  (NON AFRICAN AMERICAN): 45.2 ML/MIN/1.73 M^2
GLUCOSE SERPL-MCNC: 103 MG/DL
HCT VFR BLD AUTO: 41.1 %
HGB BLD-MCNC: 12.6 G/DL
IMM GRANULOCYTES # BLD AUTO: 0.03 K/UL
IMM GRANULOCYTES NFR BLD AUTO: 0.4 %
LYMPHOCYTES # BLD AUTO: 1.8 K/UL
LYMPHOCYTES NFR BLD: 25.7 %
MCH RBC QN AUTO: 25.3 PG
MCHC RBC AUTO-ENTMCNC: 30.7 G/DL
MCV RBC AUTO: 83 FL
MONOCYTES # BLD AUTO: 0.6 K/UL
MONOCYTES NFR BLD: 8.9 %
NEUTROPHILS # BLD AUTO: 4.2 K/UL
NEUTROPHILS NFR BLD: 60.5 %
NRBC BLD-RTO: 0 /100 WBC
PHOSPHATE SERPL-MCNC: 3 MG/DL
PLATELET # BLD AUTO: 155 K/UL
PMV BLD AUTO: 12 FL
POTASSIUM SERPL-SCNC: 3.5 MMOL/L
PROT SERPL-MCNC: 7 G/DL
RBC # BLD AUTO: 4.98 M/UL
SODIUM SERPL-SCNC: 142 MMOL/L
TACROLIMUS BLD-MCNC: 7 NG/ML
WBC # BLD AUTO: 6.93 K/UL

## 2018-04-30 PROCEDURE — 36415 COLL VENOUS BLD VENIPUNCTURE: CPT

## 2018-04-30 PROCEDURE — 85025 COMPLETE CBC W/AUTO DIFF WBC: CPT

## 2018-04-30 PROCEDURE — 84075 ASSAY ALKALINE PHOSPHATASE: CPT

## 2018-04-30 PROCEDURE — 80197 ASSAY OF TACROLIMUS: CPT

## 2018-04-30 PROCEDURE — 80069 RENAL FUNCTION PANEL: CPT

## 2018-04-30 NOTE — PROGRESS NOTES
Prograf at goal. Cr stable. Corrected calcium 8.18 - please send lab to his general nephrologist to optimize MBD

## 2018-05-04 DIAGNOSIS — N18.30 CKD (CHRONIC KIDNEY DISEASE), STAGE III: ICD-10-CM

## 2018-05-04 RX ORDER — MYCOPHENOLATE MOFETIL 250 MG/1
CAPSULE ORAL
Qty: 120 CAPSULE | Refills: 1 | Status: SHIPPED | OUTPATIENT
Start: 2018-05-04 | End: 2018-07-23 | Stop reason: SDUPTHER

## 2018-05-07 ENCOUNTER — OFFICE VISIT (OUTPATIENT)
Dept: TRANSPLANT | Facility: CLINIC | Age: 63
End: 2018-05-07
Payer: COMMERCIAL

## 2018-05-07 VITALS
SYSTOLIC BLOOD PRESSURE: 164 MMHG | BODY MASS INDEX: 30.97 KG/M2 | DIASTOLIC BLOOD PRESSURE: 78 MMHG | HEART RATE: 59 BPM | RESPIRATION RATE: 18 BRPM | TEMPERATURE: 98 F | WEIGHT: 233.69 LBS | HEIGHT: 73 IN | OXYGEN SATURATION: 100 %

## 2018-05-07 DIAGNOSIS — F17.200 SMOKING: ICD-10-CM

## 2018-05-07 DIAGNOSIS — M89.8X9 METABOLIC BONE DISEASE: ICD-10-CM

## 2018-05-07 DIAGNOSIS — Z94.0 H/O KIDNEY TRANSPLANT: ICD-10-CM

## 2018-05-07 DIAGNOSIS — Z94.0 IMMUNOSUPPRESSIVE MANAGEMENT ENCOUNTER FOLLOWING KIDNEY TRANSPLANT: ICD-10-CM

## 2018-05-07 DIAGNOSIS — F17.200 SMOKING ADDICTION: ICD-10-CM

## 2018-05-07 DIAGNOSIS — N25.81 SECONDARY RENAL HYPERPARATHYROIDISM: ICD-10-CM

## 2018-05-07 DIAGNOSIS — I50.30 (HFPEF) HEART FAILURE WITH PRESERVED EJECTION FRACTION: ICD-10-CM

## 2018-05-07 DIAGNOSIS — Z79.899 IMMUNOSUPPRESSIVE MANAGEMENT ENCOUNTER FOLLOWING KIDNEY TRANSPLANT: ICD-10-CM

## 2018-05-07 DIAGNOSIS — Z12.83 SKIN CANCER SCREENING: ICD-10-CM

## 2018-05-07 DIAGNOSIS — I10 ESSENTIAL HYPERTENSION: ICD-10-CM

## 2018-05-07 DIAGNOSIS — Z79.01 LONG TERM (CURRENT) USE OF ANTICOAGULANTS: ICD-10-CM

## 2018-05-07 DIAGNOSIS — N18.30 CKD (CHRONIC KIDNEY DISEASE) STAGE 3, GFR 30-59 ML/MIN: Primary | ICD-10-CM

## 2018-05-07 PROCEDURE — 99205 OFFICE O/P NEW HI 60 MIN: CPT | Mod: S$GLB,,, | Performed by: INTERNAL MEDICINE

## 2018-05-07 PROCEDURE — 3008F BODY MASS INDEX DOCD: CPT | Mod: CPTII,S$GLB,, | Performed by: INTERNAL MEDICINE

## 2018-05-07 PROCEDURE — 3078F DIAST BP <80 MM HG: CPT | Mod: CPTII,S$GLB,, | Performed by: INTERNAL MEDICINE

## 2018-05-07 PROCEDURE — 3077F SYST BP >= 140 MM HG: CPT | Mod: CPTII,S$GLB,, | Performed by: INTERNAL MEDICINE

## 2018-05-07 PROCEDURE — 99999 PR PBB SHADOW E&M-EST. PATIENT-LVL V: CPT | Mod: PBBFAC,,, | Performed by: INTERNAL MEDICINE

## 2018-05-07 RX ORDER — WARFARIN SODIUM 5 MG/1
5 TABLET ORAL DAILY
COMMUNITY
End: 2018-08-10 | Stop reason: SDUPTHER

## 2018-05-07 NOTE — PROGRESS NOTES
Kidney Post-Transplant Assessment    Referring Physician:   Current Nephrologist: Emre Latif    ORGAN: LEFT KIDNEY  Donor Type: ?  PHS Increased Risk: ?  Cold Ischemia: 504 mins  Induction Medications: thymoglobulin    Subjective:     CC:  Reassessment of renal allograft function and management of chronic immunosuppression.    Kidney History:  Mr. Phelps is a 62 y.o. year old Black or  male with history of ESRD secondary to unclear etiology who was on dialysis started in ~Jan 1992 until receiving DDRT in July 1992 at [facility] [place] which failed secondary to rejection and he resumed dialysis in April 1996 until receiving DDRT #2 (THYMO x3 induction, CMV D-/R+) on 4/12/05. He had transplant nephrectomy of DDRT #1 (possibly in 1997 vs 2005 around time of second transplant). He has CKD stage 2/3 - GFR 30-89 and his baseline creatinine is between 1.2 to 1.8. He takes mycophenolate mofetil, prednisone and tacrolimus for maintenance immunosuppression.     He has history of A-fib s/p DCCV in 4/2009 on warfarin, pericarditis x3 episodes (last episode in Jan 2017), gout, CAD s/p stent placement to LAD in 2006.    Interval History: Mr Phelps is here in Kidney transplant Clinic He endorses he feels well and has a good appetite nio as a follow. He was last seen in 21/2018 at [facility] transplant kidney. He has had recent hospitalization secondary to SOB episode with preserved EF but he has a Hx of Afib with RVR. His sCr remains stable at 1.6 mg/dL. Today BP is elevated in clinic today 151/89 but in recent clinic visit he has been controlled. He is still smoking and has decline smoking cessation clinic in the past.       Review of Systems  Constitutional: +fatigue; Negative for fever, appetite change  HENT: +needs dental work; Negative for hearing loss, sore throat and mouth sores.   Eyes: Negative for photophobia, pain and visual disturbance.   Respiratory: +occasional DAUGHERTY - was able to walk from garage to clinic  without symptoms but states at times will get DAUGHERTY with walking; improving since last hospitalization; Negative for cough and wheezing.   Cardiovascular: +occasional palpitations associated with A-fib; Negative for chest pain and leg swelling.   Gastrointestinal: +intermittent abdominal pain and diarrhea which he reports occurs when he takes colchicine; Negative for nausea, vomiting, constipation, blood in stool and abdominal distention.   Genitourinary: Negative for dysuria, urgency, frequency, hematuria, decreased urine volume, difficulty urinating.   Musculoskeletal: +states he has very seldom gout flares; +arthritis  Skin: Negative for pallor, rash and wound.   Neurological: +right handed tremor for the last 30 years but much worse over the last 3-4 months; he is right handed and at times will drop things - states while placing a cup of coffee on the table he got to shaking so bad that he dropped coffee everywhere - offered referral to neurology for further evaluation since this does not seem to be consistent with Prograf toxicity since it is unilateral and pre-dated his first transplant even; +occasional sinus headaches - relieved with Tylenol;  Negative for dizziness, syncope, weakness, light-headedness  Hematological: +bruises easily - on warfarin for about the last year; Negative for adenopathy.  Psychiatric/Behavioral: Negative for confusion, sleep disturbance and dysphoric mood. The patient is not nervous/anxious.     Medications:  Current Outpatient Prescriptions   Medication Sig Dispense Refill    aspirin (ADULT ASPIRIN EC LOW STRENGTH) 81 MG EC tablet Take 1 tablet by mouth Daily.      atorvastatin (LIPITOR) 10 MG tablet TAKE 1 TABLET (10 MG TOTAL) BY MOUTH ONCE DAILY. 90 tablet 3    calcium carbonate 500 mg/5 mL (1,250 mg/5 mL) Take 10 mLs (1,000 mg total) by mouth 2 (two) times daily. 473 mL 11    doxazosin (CARDURA) 4 MG tablet Take 4 mg by mouth every 12 (twelve) hours. Patient takine one tablet  "twice daily      fexofenadine (ALLEGRA) 60 MG tablet Take 1 tablet by mouth Twice daily as needed.      furosemide (LASIX) 40 MG tablet TAKE 0.5 TABLETS (20 MG TOTAL) BY MOUTH 2 (TWO) TIMES DAILY. 180 tablet 1    gabapentin (NEURONTIN) 300 MG capsule 1 po q hs x 3 days, then 1 po bid x 3 days, then 1 po tid. 90 capsule 2    metoprolol tartrate (LOPRESSOR) 50 MG tablet TAKE 1 TABLET (50 MG TOTAL) BY MOUTH 2 (TWO) TIMES DAILY. 180 tablet 3    multivitamin (THERAGRAN) per tablet Take 1 tablet by mouth Daily.      mycophenolate (CELLCEPT) 250 mg Cap TAKE 2 CAPSULES BY MOUTH EVERY 12 HOURS 120 capsule 1    nicotine (NICODERM CQ) 21 mg/24 hr PLACE 1 PATCH ONTO THE SKIN ONCE DAILY. 28 patch 4    nicotine (NTS STEP 1) 21 mg/24 hr PLACE 1 PATCH ONTO THE SKIN ONCE DAILY. 28 patch 6    NIFEdipine (ADALAT CC) 60 MG TbSR TAKE 1 TABLET BY MOUTH TWICE DAILY. 180 tablet 3    paricalcitol (ZEMPLAR) 1 MCG capsule One po MWF 90 capsule 3    potassium chloride SA (K-DUR,KLOR-CON) 20 MEQ tablet TAKE 1 TABLET BY MOUTH DAILY 90 tablet 3    predniSONE (DELTASONE) 5 MG tablet Take 1 tablet (5 mg total) by mouth every morning. 90 tablet 3    spironolactone (ALDACTONE) 25 MG tablet TAKE 1 TABLET (25 MG TOTAL) BY MOUTH ONCE DAILY. 90 tablet 3    tacrolimus (PROGRAF) 1 MG Cap TAKE 4 CAPSULES BY MOUTH IN THE MORNING AND THEN TAKE 3 CAPSULES BY MOUTH IN THE EVENING 240 capsule 11    vitamin D 1000 units Tab Take 370 mg by mouth 2 (two) times daily. 2 tablets BID      warfarin (COUMADIN) 5 MG tablet Take 5 mg by mouth Daily.       No current facility-administered medications for this visit.          Objective:   Blood pressure (!) 164/78, pulse (!) 59, temperature 98.3 °F (36.8 °C), temperature source Oral, resp. rate 18, height 6' 1" (1.854 m), weight 106 kg (233 lb 11 oz), SpO2 100 %.body mass index is 30.83 kg/m².    Physical Exam  General: No acute distress, well groomed, alert and oriented x 3  HEENT: Normocephalic, " atraumatic, EOM's intact bilaterally, external inspection of ears and nose normal, moist mucous membranes, no oral ulcerations/lesions  Neck: Supple, symmetrical, trachea midline, no thyromegaly, no JVD  Respiratory: Clear to auscultation bilaterally, respirations unlabored, no rales/rhonchi/wheezing  Cardiovacular: irregularly irregular, S1, S2 normal, no murmurs, rubs or gallops  Gastrointestinal: Soft, non-tender, bowel sounds normal, no hepatosplenomegaly  LLQ renal allograft exam: No tenderness, no bruits, normal exam  Musculoskeletal: No knee or ankle joint tenderness or swelling.   Extremities: No clubbing or cyanosis of bilateral upper extremities; no lower extremity edema bilaterally, radial pulses 2+ bilaterally, symmetric  Skin: warm and dry; no rash on exposed skin  Neurologic: CN grossly intact      Labs:  Lab Results   Component Value Date    WBC 6.93 04/30/2018    HGB 12.6 (L) 04/30/2018    HCT 41.1 04/30/2018     04/30/2018    K 3.5 04/30/2018     04/30/2018    CO2 25 04/30/2018    BUN 17 04/30/2018    CREATININE 1.6 (H) 04/30/2018    EGFRNONAA 45.2 (A) 04/30/2018    GLUCOSE 121 (H) 04/12/2005    CALCIUM 7.7 (L) 04/30/2018    PHOS 3.0 04/30/2018    MG 1.7 09/25/2017    ALBUMIN 3.4 (L) 04/30/2018    ALBUMIN 3.4 (L) 04/30/2018    AST 22 04/30/2018    ALT 19 04/30/2018       No results found for: EXTANC, EXTWBC, EXTSEGS, EXTPLATELETS, EXTHEMOGLOBI, EXTHEMATOCRI, EXTCREATININ, EXTSODIUM, EXTPOTASSIUM, EXTBUN, EXTCO2, EXTCALCIUM, EXTPHOSPHORU, EXTGLUCOSE, EXTALBUMIN, EXTAST, EXTALT, EXTBILITOTAL, EXTLIPASE, EXTAMYLASE    No results found for: EXTCYCLOSLVL, EXTSIROLIMUS, EXTTACROLVL, EXTPROTCRE, EXTPTHINTACT, EXTPROTEINUA, EXTWBCUA, EXTRBCUA    Labs were reviewed with the patient.    Assessment/Plan:     1. CKD (chronic kidney disease) stage 3, GFR 30-59 ml/min    2. (HFpEF) heart failure with preserved ejection fraction    3. Long term (current) use of anticoagulants [V58.61]    4. Secondary  renal hyperparathyroidism    5. Metabolic bone disease    6. H/O kidney transplant    7. Essential hypertension    8. Immunosuppressive management encounter following kidney transplant    9. Skin cancer screening    10. Smoking    11. Smoking addiction        Mr. Phelps is a 62 y.o. male with:     # History of ESRD secondary to unclear etiology who was on dialysis started in ~Jan 1992 until receiving DDRT in July 1992 at [facility] [place] [place] which failed secondary to rejection and he resumed dialysis in April 1996 until receiving DDRT #2 (THYMO x3 induction, CMV D-/R+) on 4/12/05: baseline Cr 1.2  to 1.8  - last Cr within baseline at 1.6 from 4/30/18  - last UPC 0.23 gm from 4/30/18    # Immunosuppression:   - continue Prograf 4/3, last FK-506 level 7.0 from 4/30/2018  - continue  mg BID   - continue prednisone 5 mg daily   - will monitor closely for toxicities    # Infectious Surveillance:   - last CMV negative from 1/24/17  - last BK urine PCR negative from 12/6/12    # HTN: BPs well controlled at home  - continue current anti-hypertensive regimen  - continue with home blood pressure monitoring  - low salt and healthy life discussed with the patient    # Metabolic Bone Disease/Secondary Hyperparathyroidism: last calcium low but phos normal   - continue calcium carbonate, zemplar, and vitamin D - adjustment per general nephrologist   - Co Calcium 8.18  - Defer adjustment to Primary Nephrologist Will cont to monitor per our center protocls.    # Anemia of chronic disease: Hb stable at 12.6  - will continue monitoring as per our center guidelines. No indication for acute intervention today    # History of CAD s/p PCI to LAD in 2006  - continue ASA, beta blocker, statin     # History of Pericarditis: patient with 3 episodes of pericarditis; last one was in Jan 2017  - continue colchicine     # A-fib:   - continue metoprolol   - continue K-DUR and calcium supplements to optimized electrolytes   - continue warfarin     #  Hypoalbuminemia: albumin low at 3.4 but improved from previous 3.0  - encouraged patient to eat more protein and/or protein/caloric drinks    Follow-up:   Annual follow-up with kidney transplant clinic with repeat labs, including renal function panel with UA, urine protein/creatinine ratio, and drug trough level q3 months.  Patient also reminded to follow-up with general nephrologist.    Case discussed with Dr. Contreras Bach MD   Nephrology Fellow    Staff Transplant Nephrology Addendum:    Patient was discussed with Dr. Jefe Bach as outlined in his note. I have personally evaluated Mr. Phelps and agree with the findings listed in Dr. Bach's attached note with additional comments/corrections as below:    62 y.o. AAM with history of ESRD secondary to unclear etiology who was on dialysis started in ~Jan 1992 until receiving DDRT in July 1992 at [facility] [place] which failed secondary to rejection and he resumed dialysis in April 1996 until receiving DDRT #2 (THYMO x3 induction, CMV D-/R+) on 4/12/05. He had transplant nephrectomy of DDRT #1 (possibly in 1997 vs 2005 around time of second transplant). He has CKD stage 2/3 - GFR 30-89 and his baseline creatinine is between 1.2 to 1.8. He has history of A-fib s/p DCCV in 4/2009 on warfarin, pericarditis x3 episodes (last episode in Jan 2017), gout, CAD s/p stent placement to LAD in 2006. He is here for follow-up. He was last seen in transplant nephrology clinic on 2/22/17. He was admitted with DAUGHERTY from 9/24/17 to 9/25/17. He is doing well overall. Cr at baseline at 1.6 from 4/30/18. UPC stable at 0.23 from 4/30/18. Prograf at goal at 7.0 from 4/30/18. Hypocalcemia --> defer to general nephrologist to manage MBD. Continue Prograf 4/3,  mg BID, and prednisone 5 mg daily. Continue to monitor for toxicities from immunosuppressive medications. Counseled patient on smoking cessation and annual dermatology evaluation.     Layne Chairez MD  Renal Transplant  Attending           Education:   Material provided to the patient.  Patient reminded to call with any health changes since these can be early signs of significant complications.  Also, I advised the patient to be sure any new medications or changes of old medications are discussed with either a pharmacist or physician knowledgeable with transplant to avoid rejection/drug toxicity related to significant drug interactions.    UNOS Patient Status  Functional Status: 100% - Normal, no complaints, no evidence of disease  Physical Capacity: No Limitations

## 2018-05-07 NOTE — LETTER
May 10, 2018        Emre Latif  [address]  [address]LO[address]  [facility] [address]  Phone: [phone]  Fax: [phone]             [address]  [address]  [facility] [address]  Phone: [phone]   Patient: Ibrahima Phelps   MR Number: [id number]   YOB: 1955   Date of Visit: 5/7/2018       Dear Dr. Emre Latif    Thank you for referring Ibrahima Phelps to me for evaluation. Attached you will find relevant portions of my assessment and plan of care.    If you have questions, please do not hesitate to call me. I look forward to following Ibrahima Phelps along with you.    Sincerely,    Layne Chairez MD    Enclosure    If you would like to receive this communication electronically, please contact [email or web address] or [phone] to request [employer] access.    [employer] is a tool which provides read-only access to select patient information with whom you have a relationship. Its easy to use and provides real time access to review your patients record including encounter summaries, notes, results, and demographic information.    If you feel you have received this communication in error or would no longer like to receive these types of communications, please e-mail [email or web address]

## 2018-05-07 NOTE — PATIENT INSTRUCTIONS
1. Stop smoking   2. Stay active   3. Stay hydrated   4. Monitor your blood pressures at home and take those values to your general nephrologist  5. See a dermatologist annually

## 2018-05-10 NOTE — PROGRESS NOTES
Staff Transplant Nephrology Addendum:    Patient was discussed with Dr. Jefe Bach as outlined in his note. I have personally evaluated Mr. Phelps and agree with the findings listed in Dr. Bach's attached note with additional comments/corrections as below:    62 y.o. AAM with history of ESRD secondary to unclear etiology who was on dialysis started in ~Jan 1992 until receiving DDRT in July 1992 at [facility] [place] which failed secondary to rejection and he resumed dialysis in April 1996 until receiving DDRT #2 (THYMO x3 induction, CMV D-/R+) on 4/12/05. He had transplant nephrectomy of DDRT #1 (possibly in 1997 vs 2005 around time of second transplant). He has CKD stage 2/3 - GFR 30-89 and his baseline creatinine is between 1.2 to 1.8. He has history of A-fib s/p DCCV in 4/2009 on warfarin, pericarditis x3 episodes (last episode in Jan 2017), gout, CAD s/p stent placement to LAD in 2006. He is here for follow-up. He was last seen in transplant nephrology clinic on 2/22/17. He was admitted with DAUGHERTY from 9/24/17 to 9/25/17. He is doing well overall. Cr at baseline at 1.6 from 4/30/18. UPC stable at 0.23 from 4/30/18. Prograf at goal at 7.0 from 4/30/18. Hypocalcemia --> defer to general nephrologist to manage MBD. Continue Prograf 4/3,  mg BID, and prednisone 5 mg daily. Continue to monitor for toxicities from immunosuppressive medications. Counseled patient on smoking cessation and annual dermatology evaluation.     Layne Chairez MD  Renal Transplant Attending

## 2018-06-04 ENCOUNTER — LAB VISIT (OUTPATIENT)
Dept: LAB | Facility: HOSPITAL | Age: 63
End: 2018-06-04
Attending: INTERNAL MEDICINE
Payer: COMMERCIAL

## 2018-06-04 ENCOUNTER — OFFICE VISIT (OUTPATIENT)
Dept: CARDIOLOGY | Facility: CLINIC | Age: 63
End: 2018-06-04
Payer: COMMERCIAL

## 2018-06-04 VITALS
BODY MASS INDEX: 31.59 KG/M2 | WEIGHT: 238.31 LBS | HEART RATE: 63 BPM | DIASTOLIC BLOOD PRESSURE: 77 MMHG | SYSTOLIC BLOOD PRESSURE: 162 MMHG | HEIGHT: 73 IN

## 2018-06-04 DIAGNOSIS — I48.0 PAROXYSMAL ATRIAL FIBRILLATION: Primary | ICD-10-CM

## 2018-06-04 DIAGNOSIS — Z86.79 HISTORY OF PERICARDITIS: ICD-10-CM

## 2018-06-04 DIAGNOSIS — N18.30 CKD (CHRONIC KIDNEY DISEASE) STAGE 3, GFR 30-59 ML/MIN: ICD-10-CM

## 2018-06-04 DIAGNOSIS — I10 ESSENTIAL HYPERTENSION: ICD-10-CM

## 2018-06-04 DIAGNOSIS — Z94.0 H/O KIDNEY TRANSPLANT: ICD-10-CM

## 2018-06-04 DIAGNOSIS — Z94.0 KIDNEY TRANSPLANT RECIPIENT: ICD-10-CM

## 2018-06-04 DIAGNOSIS — E78.2 MIXED HYPERLIPIDEMIA: ICD-10-CM

## 2018-06-04 DIAGNOSIS — Z72.0 TOBACCO USE: ICD-10-CM

## 2018-06-04 DIAGNOSIS — I25.10 CORONARY ARTERY DISEASE INVOLVING NATIVE CORONARY ARTERY OF NATIVE HEART WITHOUT ANGINA PECTORIS: ICD-10-CM

## 2018-06-04 LAB
ALBUMIN SERPL BCP-MCNC: 3.7 G/DL
ALBUMIN SERPL BCP-MCNC: 3.7 G/DL
ALP SERPL-CCNC: 77 U/L
ALT SERPL W/O P-5'-P-CCNC: 23 U/L
ANION GAP SERPL CALC-SCNC: 11 MMOL/L
AST SERPL-CCNC: 24 U/L
BILIRUB DIRECT SERPL-MCNC: 0.2 MG/DL
BILIRUB SERPL-MCNC: 0.3 MG/DL
BUN SERPL-MCNC: 18 MG/DL
CA-I BLDV-SCNC: 0.87 MMOL/L
CA-I BLDV-SCNC: 0.87 MMOL/L
CALCIUM SERPL-MCNC: 7.2 MG/DL
CHLORIDE SERPL-SCNC: 106 MMOL/L
CO2 SERPL-SCNC: 22 MMOL/L
CREAT SERPL-MCNC: 1.7 MG/DL
EST. GFR  (AFRICAN AMERICAN): 48.5 ML/MIN/1.73 M^2
EST. GFR  (NON AFRICAN AMERICAN): 42 ML/MIN/1.73 M^2
GLUCOSE SERPL-MCNC: 100 MG/DL
MAGNESIUM SERPL-MCNC: 2 MG/DL
PHOSPHATE SERPL-MCNC: 3.3 MG/DL
POTASSIUM SERPL-SCNC: 4 MMOL/L
PROT SERPL-MCNC: 7.2 G/DL
PTH-INTACT SERPL-MCNC: 81 PG/ML
SODIUM SERPL-SCNC: 139 MMOL/L
TACROLIMUS BLD-MCNC: 7 NG/ML

## 2018-06-04 PROCEDURE — 3008F BODY MASS INDEX DOCD: CPT | Mod: CPTII,S$GLB,, | Performed by: INTERNAL MEDICINE

## 2018-06-04 PROCEDURE — 99999 PR PBB SHADOW E&M-EST. PATIENT-LVL III: CPT | Mod: PBBFAC,,, | Performed by: INTERNAL MEDICINE

## 2018-06-04 PROCEDURE — 3077F SYST BP >= 140 MM HG: CPT | Mod: CPTII,S$GLB,, | Performed by: INTERNAL MEDICINE

## 2018-06-04 PROCEDURE — 82247 BILIRUBIN TOTAL: CPT

## 2018-06-04 PROCEDURE — 84075 ASSAY ALKALINE PHOSPHATASE: CPT

## 2018-06-04 PROCEDURE — 82330 ASSAY OF CALCIUM: CPT

## 2018-06-04 PROCEDURE — 83735 ASSAY OF MAGNESIUM: CPT

## 2018-06-04 PROCEDURE — 83970 ASSAY OF PARATHORMONE: CPT

## 2018-06-04 PROCEDURE — 80069 RENAL FUNCTION PANEL: CPT

## 2018-06-04 PROCEDURE — 99214 OFFICE O/P EST MOD 30 MIN: CPT | Mod: S$GLB,,, | Performed by: INTERNAL MEDICINE

## 2018-06-04 PROCEDURE — 36415 COLL VENOUS BLD VENIPUNCTURE: CPT

## 2018-06-04 PROCEDURE — 80197 ASSAY OF TACROLIMUS: CPT

## 2018-06-04 PROCEDURE — 3078F DIAST BP <80 MM HG: CPT | Mod: CPTII,S$GLB,, | Performed by: INTERNAL MEDICINE

## 2018-06-04 NOTE — PROGRESS NOTES
Subjective:   Patient ID:  Ibrahima Phelps is a 62 y.o. male who presents for follow-up of Acute pericarditis, unspecified type      HPI: He has not had any recurrence of his pericarditis. He does report occasional heart palpitations which occur maybe once or twice a month and last a few minutes. He also reports one episode of weakness while cutting the grass. He did not have any chest discomfort, palpitations or shortness of breath. He felt tired the rest of the day. He is still smoking and is not ready to quit. His last echo was 9/17 and showed an LVEF of 55-60% with a pasp of 39. His last ischemia evaluation was 4/15 and was normal.    ECG: (9/24/17): NSR 64 bpm. LVH with repolarization.    Past Medical History:   Diagnosis Date    (HFpEF) heart failure with preserved ejection fraction 9/25/2017    Atrial fibrillation     CAD (coronary artery disease)     Diverticulosis     Hyperlipidemia     Hypertension     Immunodeficiency due to treatment with immunosuppressive medication     Metabolic bone disease     Pericarditis     S/P kidney transplant     Thrombocytopenia     Thyroid disease     Unspecified disorder of kidney and ureter     Stage IIICKD       Past Surgical History:   Procedure Laterality Date    CARDIOVERSION  04/30/15    CHOLECYSTECTOMY      COLONOSCOPY  April 20, 2011    Diverticulosis, repeat recommended in 3 yrs., repeat colonoscopy 2014 revealed 2 polyps.  He should return in 5 years.   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 He is not  exercising.    Alcohol use No    Drug use: No    Sexual activity: Yes     Partners: Female     Other Topics Concern    None     Social History Narrative    None       Family History   Problem Relation Age of Onset    No Known Problems Sister     No Known Problems Brother     Thyroid disease Mother         s/p surgery    Heart disease Father         had pacemaker    Heart disease Daughter         enlarged heart    No Known Problems Sister     Kidney failure Sister     Kidney disease Sister     No Known Problems Sister     Kidney disease Brother     Kidney failure Brother         s/p transplant    No Known Problems Brother     Diabetes Mellitus Neg Hx     Stroke Neg Hx     Heart attack Neg Hx     Cancer Neg Hx     Celiac disease Neg Hx     Cirrhosis Neg Hx     Colon cancer Neg Hx     Esophageal cancer Neg Hx     Inflammatory bowel disease Neg Hx     Rectal cancer Neg Hx     Stomach cancer Neg Hx     Ulcerative colitis Neg Hx     Liver disease Neg Hx     Liver cancer Neg Hx     Crohn's disease Neg Hx        Patient's Medications   New Prescriptions    No medications on file   Previous Medications    ASPIRIN (ADULT ASPIRIN EC LOW STRENGTH) 81 MG EC TABLET    Take 1 tablet by mouth Daily.    ATORVASTATIN (LIPITOR) 10 MG TABLET    TAKE 1 TABLET (10 MG TOTAL) BY MOUTH ONCE DAILY.    CALCIUM CARBONATE 500 MG/5 ML (1,250 MG/5 ML)    Take 10 mLs (1,000 mg total) by mouth 2 (two) times daily.    DOXAZOSIN (CARDURA) 4 MG TABLET    Take 4 mg by mouth every 12 (twelve) hours. Patient takine one tablet twice daily    FEXOFENADINE (ALLEGRA) 60 MG TABLET    Take 1 tablet by mouth Twice daily as needed.    FUROSEMIDE (LASIX) 40 MG TABLET    TAKE 0.5 TABLETS (20 MG TOTAL) BY MOUTH 2 (TWO) TIMES DAILY.    GABAPENTIN (NEURONTIN) 300 MG CAPSULE    1 po q hs x 3 days, then 1 po bid x 3 days, then 1 po tid.    METOPROLOL TARTRATE (LOPRESSOR) 50 MG TABLET    TAKE 1 TABLET (50 MG TOTAL) BY MOUTH 2 (TWO) TIMES  DAILY.    MULTIVITAMIN (THERAGRAN) PER TABLET    Take 1 tablet by mouth Daily.    MYCOPHENOLATE (CELLCEPT) 250 MG CAP    TAKE 2 CAPSULES BY MOUTH EVERY 12 HOURS    NICOTINE (NTS STEP 1) 21 MG/24 HR    PLACE 1 PATCH ONTO THE SKIN ONCE DAILY.    NIFEDIPINE (ADALAT CC) 60 MG TBSR    TAKE 1 TABLET BY MOUTH TWICE DAILY.    PARICALCITOL (ZEMPLAR) 1 MCG CAPSULE    One po MWF    POTASSIUM CHLORIDE SA (K-DUR,KLOR-CON) 20 MEQ TABLET    TAKE 1 TABLET BY MOUTH DAILY    PREDNISONE (DELTASONE) 5 MG TABLET    Take 1 tablet (5 mg total) by mouth every morning.    SPIRONOLACTONE (ALDACTONE) 25 MG TABLET    TAKE 1 TABLET (25 MG TOTAL) BY MOUTH ONCE DAILY.    TACROLIMUS (PROGRAF) 1 MG CAP    TAKE 4 CAPSULES BY MOUTH IN THE MORNING AND THEN TAKE 3 CAPSULES BY MOUTH IN THE EVENING    VITAMIN D 1000 UNITS TAB    Take 370 mg by mouth 2 (two) times daily. 2 tablets BID    WARFARIN (COUMADIN) 5 MG TABLET    Take 5 mg by mouth Daily.   Modified Medications    No medications on file   Discontinued Medications    NICOTINE (NICODERM CQ) 21 MG/24 HR    PLACE 1 PATCH ONTO THE SKIN ONCE DAILY.       Review of Systems   Constitution: Negative for weakness, malaise/fatigue and weight gain.   HENT: Negative for hearing loss.    Eyes: Negative for visual disturbance.   Cardiovascular: Positive for palpitations. Negative for chest pain, claudication, dyspnea on exertion, leg swelling, near-syncope, orthopnea, paroxysmal nocturnal dyspnea and syncope.   Respiratory: Negative for cough, shortness of breath, sleep disturbances due to breathing, snoring and wheezing.    Endocrine: Negative for cold intolerance, heat intolerance, polydipsia, polyphagia and polyuria.   Hematologic/Lymphatic: Negative for bleeding problem. Does not bruise/bleed easily.   Skin: Positive for color change. Negative for rash and suspicious lesions.   Musculoskeletal: Negative for arthritis, falls, joint pain, muscle weakness and myalgias.   Gastrointestinal: Negative for  "abdominal pain, change in bowel habit, constipation, diarrhea, heartburn, hematochezia, melena and nausea.   Genitourinary: Negative for hematuria and nocturia.   Neurological: Negative for excessive daytime sleepiness, dizziness, headaches, light-headedness and loss of balance.   Psychiatric/Behavioral: Negative for depression. The patient is not nervous/anxious.    Allergic/Immunologic: Negative for environmental allergies.       BP (!) 162/77 (BP Location: Right arm, Patient Position: Sitting, BP Method: X-Large (Automatic))   Pulse 63   Ht 6' 1" (1.854 m)   Wt 108.1 kg (238 lb 5.1 oz)   BMI 31.44 kg/m²     Objective:   Physical Exam   Constitutional: He is oriented to person, place, and time. He appears well-developed and well-nourished.        HENT:   Head: Normocephalic and atraumatic.   Mouth/Throat: Oropharynx is clear and moist.   Eyes: Conjunctivae and EOM are normal. Pupils are equal, round, and reactive to light. No scleral icterus.   Neck: Normal range of motion. Neck supple. No hepatojugular reflux and no JVD present. No tracheal deviation present. No thyromegaly present.   Cardiovascular: Normal rate, regular rhythm, normal heart sounds and intact distal pulses.  PMI is not displaced.    Pulses:       Carotid pulses are 2+ on the right side, and 2+ on the left side.       Radial pulses are 2+ on the right side, and 2+ on the left side.        Dorsalis pedis pulses are 2+ on the right side, and 2+ on the left side.        Posterior tibial pulses are 2+ on the right side, and 2+ on the left side.   Pulmonary/Chest: Effort normal and breath sounds normal.   Abdominal: Soft. Bowel sounds are normal. He exhibits no distension and no mass. There is no hepatosplenomegaly. There is no tenderness.   Musculoskeletal: He exhibits no edema or tenderness.   Lymphadenopathy:     He has no cervical adenopathy.   Neurological: He is alert and oriented to person, place, and time.   Skin: Skin is warm and dry. No " rash noted. No cyanosis or erythema. Nails show no clubbing.   Psychiatric: He has a normal mood and affect. His speech is normal and behavior is normal.       Lab Results   Component Value Date     06/04/2018    K 4.0 06/04/2018     06/04/2018    CO2 22 (L) 06/04/2018    BUN 18 06/04/2018    CREATININE 1.7 (H) 06/04/2018     06/04/2018    HGBA1C 5.8 (H) 10/02/2017    MG 2.0 06/04/2018    AST 24 06/04/2018    ALT 23 06/04/2018    ALBUMIN 3.7 06/04/2018    ALBUMIN 3.7 06/04/2018    PROT 7.2 06/04/2018    BILITOT 0.3 06/04/2018    WBC 6.93 04/30/2018    HGB 12.6 (L) 04/30/2018    HCT 41.1 04/30/2018    MCV 83 04/30/2018     04/30/2018    INR 2.0 04/18/2018    INR 2.9 (H) 02/09/2018    TSH 1.454 10/02/2017    CHOL 148 02/15/2017    HDL 37 (L) 02/15/2017    LDLCALC 76.4 02/15/2017    TRIG 173 (H) 02/15/2017     (H) 09/24/2017     (H) 09/24/2017       Assessment:     1. Paroxysmal atrial fibrillation : He has been maintaining sinus rhythm. We discussed ordering an event monitor if his palpations increase or he has any recurrent weak spells. He is anticoagulated with coumadin and follows in coumadin clinic.   2. Coronary artery disease involving native coronary artery of native heart without angina pectoris : He is asymptomatic with preserved LVEF. Continue asa and statin therapy.   3. Essential hypertension: Blood pressure is above goal today. We discussed keeping a log of home blood pressures and notifying the office if it is consistently running above 130/80 mmHg. He has an upcoming appointment with Dr Barbour and will bring his readings in to the visit. We discussed the addition of clonidine patch if he is consistently above goal of < 130/80.   4. History of pericarditis    5. Mixed hyperlipidemia : FLP ordered. Continue pravastatin.   6. CKD (chronic kidney disease) stage 3, GFR 30-59 ml/min    7. H/O kidney transplant    8. Tobacco use : He is not currently motivated to quit.        Plan:     Ibrahima was seen today for acute pericarditis, unspecified type.    Diagnoses and all orders for this visit:    Paroxysmal atrial fibrillation    Coronary artery disease involving native coronary artery of native heart without angina pectoris    Essential hypertension    History of pericarditis    Mixed hyperlipidemia  -     Lipid panel; Future    CKD (chronic kidney disease) stage 3, GFR 30-59 ml/min    H/O kidney transplant    Tobacco use        Thank you for allowing me to participate in this patient's care. Please do not hesitate to contact me with any questions or concerns.

## 2018-06-04 NOTE — PATIENT INSTRUCTIONS
We discussed keeping a log of home blood pressures and notifying the office if it is consistently running above 130/80 mmHg. If blood pressure readings are consistently elevated, consider adding clonidine patch.

## 2018-06-11 ENCOUNTER — OFFICE VISIT (OUTPATIENT)
Dept: NEPHROLOGY | Facility: CLINIC | Age: 63
End: 2018-06-11
Payer: COMMERCIAL

## 2018-06-11 ENCOUNTER — TELEPHONE (OUTPATIENT)
Dept: NEPHROLOGY | Facility: CLINIC | Age: 63
End: 2018-06-11

## 2018-06-11 ENCOUNTER — PATIENT MESSAGE (OUTPATIENT)
Dept: NEPHROLOGY | Facility: CLINIC | Age: 63
End: 2018-06-11

## 2018-06-11 VITALS
OXYGEN SATURATION: 99 % | HEART RATE: 56 BPM | DIASTOLIC BLOOD PRESSURE: 78 MMHG | SYSTOLIC BLOOD PRESSURE: 148 MMHG | BODY MASS INDEX: 31.24 KG/M2 | WEIGHT: 235.69 LBS | HEIGHT: 73 IN

## 2018-06-11 DIAGNOSIS — Z94.0 H/O KIDNEY TRANSPLANT: ICD-10-CM

## 2018-06-11 DIAGNOSIS — I48.0 PAROXYSMAL ATRIAL FIBRILLATION: ICD-10-CM

## 2018-06-11 DIAGNOSIS — Z94.0 KIDNEY TRANSPLANT RECIPIENT: Primary | ICD-10-CM

## 2018-06-11 DIAGNOSIS — R21 RASH: ICD-10-CM

## 2018-06-11 DIAGNOSIS — Z86.79 HISTORY OF PERICARDITIS: ICD-10-CM

## 2018-06-11 DIAGNOSIS — Z79.899 IMMUNODEFICIENCY DUE TO TREATMENT WITH IMMUNOSUPPRESSIVE MEDICATION: ICD-10-CM

## 2018-06-11 DIAGNOSIS — N18.30 ANEMIA IN STAGE 3 CHRONIC KIDNEY DISEASE: ICD-10-CM

## 2018-06-11 DIAGNOSIS — R73.9 HYPERGLYCEMIA: ICD-10-CM

## 2018-06-11 DIAGNOSIS — K57.90 DIVERTICULOSIS OF INTESTINE WITHOUT BLEEDING, UNSPECIFIED INTESTINAL TRACT LOCATION: ICD-10-CM

## 2018-06-11 DIAGNOSIS — N25.81 SECONDARY RENAL HYPERPARATHYROIDISM: ICD-10-CM

## 2018-06-11 DIAGNOSIS — J43.8 OTHER EMPHYSEMA: ICD-10-CM

## 2018-06-11 DIAGNOSIS — R73.03 PREDIABETES: ICD-10-CM

## 2018-06-11 DIAGNOSIS — D84.821 IMMUNODEFICIENCY DUE TO TREATMENT WITH IMMUNOSUPPRESSIVE MEDICATION: ICD-10-CM

## 2018-06-11 DIAGNOSIS — I10 ESSENTIAL HYPERTENSION: ICD-10-CM

## 2018-06-11 DIAGNOSIS — N18.30 CKD (CHRONIC KIDNEY DISEASE) STAGE 3, GFR 30-59 ML/MIN: ICD-10-CM

## 2018-06-11 DIAGNOSIS — D63.1 ANEMIA IN STAGE 3 CHRONIC KIDNEY DISEASE: ICD-10-CM

## 2018-06-11 DIAGNOSIS — Z72.0 TOBACCO USE: ICD-10-CM

## 2018-06-11 DIAGNOSIS — M89.8X9 METABOLIC BONE DISEASE: ICD-10-CM

## 2018-06-11 DIAGNOSIS — Z79.01 LONG TERM (CURRENT) USE OF ANTICOAGULANTS: ICD-10-CM

## 2018-06-11 DIAGNOSIS — I25.10 CORONARY ARTERY DISEASE INVOLVING NATIVE CORONARY ARTERY OF NATIVE HEART WITHOUT ANGINA PECTORIS: ICD-10-CM

## 2018-06-11 PROCEDURE — 3078F DIAST BP <80 MM HG: CPT | Mod: CPTII,S$GLB,, | Performed by: INTERNAL MEDICINE

## 2018-06-11 PROCEDURE — 99999 PR PBB SHADOW E&M-EST. PATIENT-LVL III: CPT | Mod: PBBFAC,,, | Performed by: INTERNAL MEDICINE

## 2018-06-11 PROCEDURE — 3008F BODY MASS INDEX DOCD: CPT | Mod: CPTII,S$GLB,, | Performed by: INTERNAL MEDICINE

## 2018-06-11 PROCEDURE — 3077F SYST BP >= 140 MM HG: CPT | Mod: CPTII,S$GLB,, | Performed by: INTERNAL MEDICINE

## 2018-06-11 PROCEDURE — 99214 OFFICE O/P EST MOD 30 MIN: CPT | Mod: S$GLB,,, | Performed by: INTERNAL MEDICINE

## 2018-06-11 RX ORDER — CALCIUM CARBONATE 500(1250)
TABLET ORAL
Qty: 120 TABLET | Refills: 11 | Status: SHIPPED | OUTPATIENT
Start: 2018-06-11 | End: 2019-02-26 | Stop reason: SDUPTHER

## 2018-06-11 NOTE — TELEPHONE ENCOUNTER
Please link a Prograf level to 6/18 draw    Please refer pt to Derm at main campus    Orders in, thank you

## 2018-06-12 ENCOUNTER — PATIENT MESSAGE (OUTPATIENT)
Dept: NEPHROLOGY | Facility: CLINIC | Age: 63
End: 2018-06-12

## 2018-06-18 ENCOUNTER — PATIENT MESSAGE (OUTPATIENT)
Dept: CARDIOLOGY | Facility: CLINIC | Age: 63
End: 2018-06-18

## 2018-06-18 ENCOUNTER — LAB VISIT (OUTPATIENT)
Dept: LAB | Facility: HOSPITAL | Age: 63
End: 2018-06-18
Attending: INTERNAL MEDICINE
Payer: COMMERCIAL

## 2018-06-18 ENCOUNTER — TELEPHONE (OUTPATIENT)
Dept: NEPHROLOGY | Facility: CLINIC | Age: 63
End: 2018-06-18

## 2018-06-18 DIAGNOSIS — E78.2 MIXED HYPERLIPIDEMIA: ICD-10-CM

## 2018-06-18 DIAGNOSIS — Z94.0 KIDNEY TRANSPLANT RECIPIENT: ICD-10-CM

## 2018-06-18 LAB
CHOLEST SERPL-MCNC: 125 MG/DL
CHOLEST/HDLC SERPL: 3.3 {RATIO}
HDLC SERPL-MCNC: 38 MG/DL
HDLC SERPL: 30.4 %
LDLC SERPL CALC-MCNC: 63.6 MG/DL
NONHDLC SERPL-MCNC: 87 MG/DL
TACROLIMUS BLD-MCNC: 6.5 NG/ML
TRIGL SERPL-MCNC: 117 MG/DL

## 2018-06-18 PROCEDURE — 80061 LIPID PANEL: CPT

## 2018-06-18 PROCEDURE — 80197 ASSAY OF TACROLIMUS: CPT

## 2018-06-18 PROCEDURE — 36415 COLL VENOUS BLD VENIPUNCTURE: CPT

## 2018-06-22 NOTE — PROGRESS NOTES
Subjective:       Patient ID: Ibrahima Phelps is a 62 y.o. Black or  male who presents for follow-up evaluation of Chronic Kidney Disease and Kidney Transplant    HPI     . He continues to smoke a few cigarettes a day. Off PPI for several months now and without dyspepsia. No LE edema and no SOB. He feels he stays hydrated but admits again that he is not very adherent with calcium. No allograft pain and no problems with urination. His wife went back to work which is why she is not present today    Review of Systems   Constitutional: Negative for activity change, appetite change, fatigue, fever and unexpected weight change.   HENT: Negative for facial swelling.    Respiratory: Negative for shortness of breath.    Cardiovascular: Negative for chest pain and leg swelling.   Gastrointestinal: Negative for abdominal pain.   Genitourinary: Negative for difficulty urinating, dysuria, frequency and hematuria.   Musculoskeletal: Negative for arthralgias.   Neurological: Negative for weakness and headaches.   Hematological: Does not bruise/bleed easily.   Psychiatric/Behavioral: Negative for decreased concentration.       Objective:      Physical Exam   Constitutional: He is oriented to person, place, and time. He appears well-developed and well-nourished.   Neck: No JVD present.   Cardiovascular: S1 normal and S2 normal.  Exam reveals no friction rub.    Pulmonary/Chest: He has no wheezes. He has no rales.   Abdominal: Soft.   Musculoskeletal: He exhibits no edema.   Neurological: He is alert and oriented to person, place, and time.   Skin: Skin is warm and dry.   Psychiatric: He has a normal mood and affect.   Vitals reviewed.      Assessment:       1. Kidney transplant recipient    2. Hyperglycemia    3. CKD (chronic kidney disease) stage 3, GFR 30-59 ml/min    4. H/O kidney transplant    5. Secondary renal hyperparathyroidism    6. Metabolic bone disease    7. Prediabetes    8. Tobacco use    9. Coronary artery  disease involving native coronary artery of native heart without angina pectoris    10. Other emphysema    11. Paroxysmal atrial fibrillation    12. Essential hypertension    13. History of pericarditis    14. Immunodeficiency due to treatment with immunosuppressive medication    15. Long term (current) use of anticoagulants [V58.61]    16. Anemia in stage 3 chronic kidney disease    17. Diverticulosis of intestine without bleeding, unspecified intestinal tract location        Plan:             ESRD s/p kidney transplsant with resultant CKD. Stable kidney function. Tac level a little high, will repeat level when he gets his lipids next week    HTN is controlled    PreDiabetes is better and stable    Mineral and Bone disease--continue current treatment will change to calcium tablets to improve adherence (the liquid calcium tastes badly)    Back pain--no NSAIDs, OK for gabapentin    Tobacco use--counseled, again      RTC 4 months with labs prior

## 2018-06-26 ENCOUNTER — OFFICE VISIT (OUTPATIENT)
Dept: DERMATOLOGY | Facility: CLINIC | Age: 63
End: 2018-06-26
Payer: COMMERCIAL

## 2018-06-26 DIAGNOSIS — D22.9 MULTIPLE BENIGN NEVI: ICD-10-CM

## 2018-06-26 DIAGNOSIS — L30.8 ECZEMA CRAQUELE: ICD-10-CM

## 2018-06-26 DIAGNOSIS — L21.9 SEBORRHEIC DERMATITIS: ICD-10-CM

## 2018-06-26 DIAGNOSIS — Z12.83 SCREENING EXAM FOR SKIN CANCER: Primary | ICD-10-CM

## 2018-06-26 PROCEDURE — 99999 PR PBB SHADOW E&M-EST. PATIENT-LVL II: CPT | Mod: PBBFAC,,, | Performed by: DERMATOLOGY

## 2018-06-26 PROCEDURE — 99203 OFFICE O/P NEW LOW 30 MIN: CPT | Mod: S$GLB,,, | Performed by: DERMATOLOGY

## 2018-06-26 RX ORDER — TRIAMCINOLONE ACETONIDE 1 MG/G
CREAM TOPICAL
Qty: 60 G | Refills: 3 | Status: SHIPPED | OUTPATIENT
Start: 2018-06-26 | End: 2019-02-26 | Stop reason: SDUPTHER

## 2018-06-26 NOTE — PROGRESS NOTES
Subjective:       Patient ID:  Ibrahima Phelps is a 62 y.o. male who presents for   Chief Complaint   Patient presents with    Skin Check     UBSE     HPI  61 yo male, history of kidney transplant, here for first skin check.  This is a high risk patient here to check for the development of new lesions.  The patient denies any moles or growths of the skin that are rapidly growing, hurting, itching, bleeding, or changing colors.  Has dandruff of scalp.  Also has dry skin on ankles, itchy.  Has been told in past it is from his boots.    Review of Systems   Constitutional: Negative for fever, chills and fatigue.   Skin: Negative for itching, rash and daily sunscreen use.   Hematologic/Lymphatic: Bruises/bleeds easily.        Objective:    Physical Exam   Constitutional: He appears well-developed and well-nourished. No distress.   Neurological: He is alert and oriented to person, place, and time. He is not disoriented.   Psychiatric: He has a normal mood and affect.   Skin:   Areas Examined (abnormalities noted in diagram):   Scalp / Hair Palpated and Inspected  Head / Face Inspection Performed  Neck Inspection Performed  Chest / Axilla Inspection Performed  Abdomen Inspection Performed  Genitals / Buttocks / Groin Inspection Performed  Back Inspection Performed  RUE Inspected  LUE Inspection Performed  RLE Inspected  LLE Inspection Performed  Nails and Digits Inspection Performed                       Diagram Legend     Erythematous scaling macule/papule c/w actinic keratosis       Vascular papule c/w angioma      Pigmented verrucoid papule/plaque c/w seborrheic keratosis      Yellow umbilicated papule c/w sebaceous hyperplasia      Irregularly shaped tan macule c/w lentigo     1-2 mm smooth white papules consistent with Milia      Movable subcutaneous cyst with punctum c/w epidermal inclusion cyst      Subcutaneous movable cyst c/w pilar cyst      Firm pink to brown papule c/w dermatofibroma      Pedunculated fleshy  papule(s) c/w skin tag(s)      Evenly pigmented macule c/w junctional nevus     Mildly variegated pigmented, slightly irregular-bordered macule c/w mildly atypical nevus      Flesh colored to evenly pigmented papule c/w intradermal nevus       Pink pearly papule/plaque c/w basal cell carcinoma      Erythematous hyperkeratotic cursted plaque c/w SCC      Surgical scar with no sign of skin cancer recurrence      Open and closed comedones      Inflammatory papules and pustules      Verrucoid papule consistent consistent with wart     Erythematous eczematous patches and plaques     Dystrophic onycholytic nail with subungual debris c/w onychomycosis     Umbilicated papule    Erythematous-base heme-crusted tan verrucoid plaque consistent with inflamed seborrheic keratosis     Erythematous Silvery Scaling Plaque c/w Psoriasis     See annotation      Assessment / Plan:        Screening exam for skin cancer  Total body skin examination performed today including at least 12 points as noted in physical examination. No lesions suspicious for malignancy noted.    Patients that have undergone transplants have a significantly higher rate of skin cancer development over time due to immunosuppression.  Total body skin cancer screenings should be performed at least once a year.    We reviewed the importance of proper sun protection, including wearing a hat, sunblock of at least SPF 30, and sun protective clothing.  It is especially important to reapply sunblock for any prolonged sun exposure.      Eczema craquele - shins -   I advised changing to a fragrance free bar soap or body wash such as Dove, for sensitive skin.    -     triamcinolone acetonide 0.1% (KENALOG) 0.1 % cream; AAA bid to rash on ankles  Dispense: 60 g; Refill: 3    Multiple benign nevi  Discussed ABCDE's of nevi.  Monitor for new mole or moles that are becoming bigger, darker, irritated, or developing irregular borders. Brochure provided.    Seborrheic  dermatitis  Patient declines medicated shampoo           Follow-up in about 1 year (around 6/26/2019) for for TBSE.

## 2018-07-08 RX ORDER — PREDNISONE 5 MG/1
5 TABLET ORAL EVERY MORNING
Qty: 90 TABLET | Refills: 0 | Status: SHIPPED | OUTPATIENT
Start: 2018-07-08 | End: 2018-09-03 | Stop reason: SDUPTHER

## 2018-07-15 RX ORDER — POTASSIUM CHLORIDE 20 MEQ/1
TABLET, EXTENDED RELEASE ORAL
Qty: 90 TABLET | Refills: 3 | Status: SHIPPED | OUTPATIENT
Start: 2018-07-15 | End: 2018-07-30

## 2018-07-23 DIAGNOSIS — N18.30 CKD (CHRONIC KIDNEY DISEASE), STAGE III: ICD-10-CM

## 2018-07-23 RX ORDER — TACROLIMUS 1 MG/1
CAPSULE ORAL
Qty: 210 CAPSULE | Refills: 11 | Status: SHIPPED | OUTPATIENT
Start: 2018-07-23 | End: 2019-08-07 | Stop reason: SDUPTHER

## 2018-07-23 RX ORDER — MYCOPHENOLATE MOFETIL 250 MG/1
500 CAPSULE ORAL EVERY 12 HOURS
Qty: 120 CAPSULE | Refills: 1 | Status: SHIPPED | OUTPATIENT
Start: 2018-07-23 | End: 2018-09-10 | Stop reason: SDUPTHER

## 2018-07-23 RX ORDER — TACROLIMUS 1 MG/1
CAPSULE ORAL
Qty: 240 CAPSULE | Refills: 11 | Status: SHIPPED | OUTPATIENT
Start: 2018-07-23 | End: 2018-07-23 | Stop reason: CLARIF

## 2018-07-23 NOTE — TELEPHONE ENCOUNTER
COnfirmed with patient he takes 4 in am and 3 in pm for prograf 1mg    Spoke with Danielle Barriga at pharmacy to clarify # 210 for 30 day supply.

## 2018-07-23 NOTE — TELEPHONE ENCOUNTER
----- Message from Zacarias Barbosa sent at 7/23/2018 11:49 AM CDT -----  Type:  Pharmacy Calling to Clarify an RX    Name of Caller:  Pretty  Pharmacy Name:  [facility]  Prescription Name:  tacrolimus (PROGRAF) 1 MG Cap   What do they need to clarify?:  Directions and quantity don't match  Best Call Back Number:  [phone]  Additional Information:  Please call to advise.

## 2018-07-23 NOTE — TELEPHONE ENCOUNTER
----- Message from Annetta Newman sent at 7/23/2018  9:21 AM CDT -----  Contact: Dean with [facility]  Dean with [facility] pharmacy [phone] called regarding Tacrolimus 1 mg Mycophenolate 250 mgfor above patient. They are requesting one of the following: verbal refills. Thanks!

## 2018-07-30 ENCOUNTER — OFFICE VISIT (OUTPATIENT)
Dept: INTERNAL MEDICINE | Facility: CLINIC | Age: 63
End: 2018-07-30
Payer: COMMERCIAL

## 2018-07-30 VITALS
WEIGHT: 232.81 LBS | SYSTOLIC BLOOD PRESSURE: 134 MMHG | DIASTOLIC BLOOD PRESSURE: 62 MMHG | BODY MASS INDEX: 30.85 KG/M2 | HEIGHT: 73 IN | HEART RATE: 68 BPM | OXYGEN SATURATION: 99 %

## 2018-07-30 DIAGNOSIS — I48.0 PAROXYSMAL ATRIAL FIBRILLATION: ICD-10-CM

## 2018-07-30 DIAGNOSIS — N25.81 SECONDARY RENAL HYPERPARATHYROIDISM: ICD-10-CM

## 2018-07-30 DIAGNOSIS — Z72.0 TOBACCO USE: ICD-10-CM

## 2018-07-30 DIAGNOSIS — D84.821 IMMUNODEFICIENCY DUE TO TREATMENT WITH IMMUNOSUPPRESSIVE MEDICATION: ICD-10-CM

## 2018-07-30 DIAGNOSIS — N18.30 CKD (CHRONIC KIDNEY DISEASE) STAGE 3, GFR 30-59 ML/MIN: ICD-10-CM

## 2018-07-30 DIAGNOSIS — Z94.0 H/O KIDNEY TRANSPLANT: ICD-10-CM

## 2018-07-30 DIAGNOSIS — D69.6 THROMBOCYTOPENIA: ICD-10-CM

## 2018-07-30 DIAGNOSIS — G25.2 INTENTION TREMOR: ICD-10-CM

## 2018-07-30 DIAGNOSIS — Z79.01 LONG TERM (CURRENT) USE OF ANTICOAGULANTS: ICD-10-CM

## 2018-07-30 DIAGNOSIS — R73.03 PREDIABETES: ICD-10-CM

## 2018-07-30 DIAGNOSIS — I13.0 HYPERTENSIVE HEART AND RENAL DISEASE WITH RENAL FAILURE, STAGE 1 THROUGH STAGE 4 OR UNSPECIFIED CHRONIC KIDNEY DISEASE, WITH HEART FAILURE: ICD-10-CM

## 2018-07-30 DIAGNOSIS — I50.30 (HFPEF) HEART FAILURE WITH PRESERVED EJECTION FRACTION: ICD-10-CM

## 2018-07-30 DIAGNOSIS — Z79.899 IMMUNODEFICIENCY DUE TO TREATMENT WITH IMMUNOSUPPRESSIVE MEDICATION: ICD-10-CM

## 2018-07-30 DIAGNOSIS — I25.10 CORONARY ARTERY DISEASE INVOLVING NATIVE CORONARY ARTERY OF NATIVE HEART WITHOUT ANGINA PECTORIS: ICD-10-CM

## 2018-07-30 DIAGNOSIS — E78.2 MIXED HYPERLIPIDEMIA: ICD-10-CM

## 2018-07-30 DIAGNOSIS — Z00.00 VISIT FOR ANNUAL HEALTH EXAMINATION: Primary | ICD-10-CM

## 2018-07-30 DIAGNOSIS — J43.8 OTHER EMPHYSEMA: ICD-10-CM

## 2018-07-30 PROBLEM — I13.10 HYPERTENSIVE HEART AND KIDNEY DISEASE: Status: ACTIVE | Noted: 2017-02-22

## 2018-07-30 PROCEDURE — 3075F SYST BP GE 130 - 139MM HG: CPT | Mod: CPTII,S$GLB,, | Performed by: INTERNAL MEDICINE

## 2018-07-30 PROCEDURE — 99999 PR PBB SHADOW E&M-EST. PATIENT-LVL IV: CPT | Mod: PBBFAC,,, | Performed by: INTERNAL MEDICINE

## 2018-07-30 PROCEDURE — 3078F DIAST BP <80 MM HG: CPT | Mod: CPTII,S$GLB,, | Performed by: INTERNAL MEDICINE

## 2018-07-30 PROCEDURE — 99214 OFFICE O/P EST MOD 30 MIN: CPT | Mod: S$GLB,,, | Performed by: INTERNAL MEDICINE

## 2018-07-30 NOTE — PROGRESS NOTES
INTERNAL MEDICINE INITIAL VISIT NOTE      CHIEF COMPLAINT     Chief Complaint   Patient presents with    Establish Care       HPI     Ibrahima Phelps is a 62 y.o. AA male who presents with HTN c CKD3 and hx renal transplant x2 in 2002 and 2005 and on immunosuppressive meds and secondary hyperPTH, HLD, A fib, CAD c chronic HF c preserved EF, COPD c baseline DAUGHERTY and tob use, thrombocytopenia, anemia, preDM, GERD, former pt of Dr. Miranda, here today to establish care.    Has been taking all meds as rx'ed.  Renal issues managed by Dr. Latif and also sees Dr. Chairez once a year.  Denies issues c voiding.    Also sees Dr. Blunt/Cards.  Reports hx stent in 2006 and on ASA, statin, and BB.  Allergic to ACEi.    Denies cp or sob.  Denies LE edema, PND, or orthopenia.  Has mild DAUGHERTY at baseline which is unchanged.    Still smokes about 5 cig/day, more when under increased stress.  Has been to smoking cessation in the past and on nicotine patch but has not had recent f/u.    Today also c/o tremor in B hands for the past year.  On review of med list and further questioning, has been on Gabapentin for about 2 yrs but says he takes it prn, less than once a week and tremors occur regardless of meds.      Past Medical History:  Past Medical History:   Diagnosis Date    (HFpEF) heart failure with preserved ejection fraction 9/25/2017    Atrial fibrillation     CAD (coronary artery disease)     Diverticulosis     Fever blister     Heart attack 2006    Hyperlipidemia     Hypertension     Immunodeficiency due to treatment with immunosuppressive medication     Keloid cicatrix     Metabolic bone disease     Pericarditis     S/P kidney transplant     Thrombocytopenia     Thyroid disease     Unspecified disorder of kidney and ureter     Stage IIICKD       Past Surgical History:  Past Surgical History:   Procedure Laterality Date    CARDIOVERSION  04/30/15    CHOLECYSTECTOMY      COLONOSCOPY  April 20, 2011     Diverticulosis, repeat recommended in 3 yrs., repeat colonoscopy 2014 revealed 2 polyps.  He should return in 5 years.    CORONARY ANGIOPLASTY WITH STENT PLACEMENT  9/13/2006    KIDNEY TRANSPLANT  2005    PARATHYROIDECTOMY         Home Medications:  Prior to Admission medications    Medication Sig Start Date End Date Taking? Authorizing Provider   aspirin (ADULT ASPIRIN EC LOW STRENGTH) 81 MG EC tablet Take 1 tablet by mouth Daily. 6/11/12  Yes Historical Provider, MD   atorvastatin (LIPITOR) 10 MG tablet TAKE 1 TABLET (10 MG TOTAL) BY MOUTH ONCE DAILY. 10/23/17  Yes Emre Latif MD   calcium carbonate (OS-TRISH) 500 mg calcium (1,250 mg) tablet Two by mouth twice a day 6/11/18  Yes Emre Latif MD   doxazosin (CARDURA) 4 MG tablet Take 4 mg by mouth every 12 (twelve) hours. Patient takine one tablet twice daily   Yes Historical Provider, MD   fexofenadine (ALLEGRA) 60 MG tablet Take 1 tablet by mouth Twice daily as needed. 6/11/12  Yes Historical Provider, MD   furosemide (LASIX) 40 MG tablet TAKE 0.5 TABLETS (20 MG TOTAL) BY MOUTH 2 (TWO) TIMES DAILY. 1/17/18  Yes Emre Latif MD   gabapentin (NEURONTIN) 300 MG capsule 1 po q hs x 3 days, then 1 po bid x 3 days, then 1 po tid.  Patient taking differently: daily as needed. 1 po q hs x 3 days, then 1 po bid x 3 days, then 1 po tid. 1/16/18  Yes Adilia Fernandez PA-C   metoprolol tartrate (LOPRESSOR) 50 MG tablet TAKE 1 TABLET (50 MG TOTAL) BY MOUTH 2 (TWO) TIMES DAILY. 12/20/17  Yes Emre Latif MD   multivitamin (THERAGRAN) per tablet Take 1 tablet by mouth Daily. 6/11/12  Yes Historical Provider, MD   mycophenolate (CELLCEPT) 250 mg Cap Take 2 capsules (500 mg total) by mouth every 12 (twelve) hours. 7/23/18  Yes Emre Latif MD   nicotine (NTS STEP 1) 21 mg/24 hr PLACE 1 PATCH ONTO THE SKIN ONCE DAILY. 10/24/17  Yes Emre Latif MD   NIFEdipine (ADALAT CC) 60 MG TbSR TAKE 1 TABLET BY MOUTH TWICE DAILY. 1/15/18  Yes  Emre Latif MD   paricalcitol (ZEMPLAR) 1 MCG capsule One po MWF 6/6/16  Yes Emre Latif MD   potassium chloride SA (K-DUR,KLOR-CON) 20 MEQ tablet TAKE 1 TABLET BY MOUTH DAILY 6/22/15  Yes Emre Latif MD   predniSONE (DELTASONE) 5 MG tablet TAKE 1 TABLET (5 MG TOTAL) BY MOUTH EVERY MORNING. 7/8/18  Yes Emre Latif MD   ranitidine (ZANTAC) 150 MG tablet Take 1 tablet (150 mg total) by mouth 2 (two) times daily. 6/11/18 6/11/19 Yes Emre Laitf MD   spironolactone (ALDACTONE) 25 MG tablet TAKE 1 TABLET (25 MG TOTAL) BY MOUTH ONCE DAILY. 12/20/17  Yes Emre Latif MD   tacrolimus (PROGRAF) 1 MG Cap TAKE 4 CAPSULES BY MOUTH IN THE MORNING AND THEN TAKE 3 CAPSULES BY MOUTH IN THE EVENING 7/23/18  Yes Emre Latif MD   triamcinolone acetonide 0.1% (KENALOG) 0.1 % cream AAA bid to rash on ankles 6/26/18  Yes Lyric Jovel MD   vitamin D 1000 units Tab Take 370 mg by mouth 2 (two) times daily. 2 tablets BID   Yes Historical Provider, MD   warfarin (COUMADIN) 5 MG tablet Take 5 mg by mouth Daily.   Yes Historical Provider, MD   potassium chloride SA (K-DUR,KLOR-CON) 20 MEQ tablet TAKE 1 TABLET BY MOUTH DAILY 7/15/18 7/30/18  Emre Latif MD       Allergies:  Review of patient's allergies indicates:   Allergen Reactions    Ace inhibitors      Other reaction(s): Swelling    Povidone-iodine      Other reaction(s): Itching  Other reaction(s): Itching    Verapamil Other (See Comments)     Other reaction(s): Unknown       Family History:  Family History   Problem Relation Age of Onset    No Known Problems Sister     No Known Problems Brother     Thyroid disease Mother         s/p surgery    Heart disease Father         had pacemaker    No Known Problems Sister     Kidney failure Sister     Kidney disease Sister     No Known Problems Sister     Kidney disease Brother     Kidney failure Brother         s/p transplant    Diabetes Mellitus Neg Hx      "Stroke Neg Hx     Heart attack Neg Hx     Cancer Neg Hx     Celiac disease Neg Hx     Cirrhosis Neg Hx     Colon cancer Neg Hx     Esophageal cancer Neg Hx     Inflammatory bowel disease Neg Hx     Rectal cancer Neg Hx     Stomach cancer Neg Hx     Ulcerative colitis Neg Hx     Liver disease Neg Hx     Liver cancer Neg Hx     Crohn's disease Neg Hx     Melanoma Neg Hx        Social History:  Social History   Substance Use Topics    Smoking status: Current Every Day Smoker     Packs/day: 0.50     Years: 40.00     Types: Cigarettes    Smokeless tobacco: Never Used      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    Alcohol use No       Review of Systems:  Review of Systems   Constitutional: Negative for appetite change, chills, fatigue, fever and unexpected weight change.   HENT: Negative for congestion, hearing loss and rhinorrhea.    Eyes: Negative for pain and visual disturbance.   Respiratory: Negative for cough, chest tightness, shortness of breath and wheezing.    Cardiovascular: Negative for chest pain, palpitations and leg swelling.   Gastrointestinal: Negative for abdominal distention and abdominal pain.   Endocrine: Negative for polydipsia and polyuria.   Genitourinary: Negative for decreased urine volume, discharge, dysuria and frequency.   Neurological: Negative for dizziness, weakness, numbness and headaches.   Psychiatric/Behavioral: Negative for behavioral problems and confusion.       Health Maintenance:   Immunizations:   Influenza n/a  TDap is up to date 3/2018  Pneumovax 12/2016, Prevnar 13 2/2017, pvax repeat rec at 64  Shingrix rec once back in stock, avoid zostavax    Cancer Screening:  Colonoscopy: is up to date. 9/2014, polyps, hyperplastic and tubular adenoma, rec f/u in 5 yrs    PHYSICAL EXAM     /62 (BP Location: Right arm, Patient Position: Sitting, BP Method: Large (Manual))   Pulse 68   Ht 6' 1" (1.854 m)   Wt 105.6 kg " (232 lb 12.9 oz)   SpO2 99%   BMI 30.71 kg/m²     GEN - A+OX4, NAD   HEENT - PERRL, EOMI, OP clear  Neck - No thyromegaly or cervical LAD. No thyroid masses felt.  CV - RRR, systolic murmur noted at 2nd RIS and LLSB s radiation.  Chest - CTAB, no wheezing, crackles, or rhonchi  Abd - S/NT/ND/+BS.   Ext - 2+BDP and radial pulses. No C/C/E.  Neuro - 5/5 BUE and BLE strength.  Tremor noted in B hands, worse c movement.  LN - No LAD appreciated.  Skin - Normal color and texture, no rash, no skin lesions.      LABS     Lab Results   Component Value Date    WBC 6.93 04/30/2018    HGB 12.6 (L) 04/30/2018    HCT 41.1 04/30/2018    MCV 83 04/30/2018     04/30/2018     Lab Results   Component Value Date    IRON 53 11/06/2014    TIBC 249 (L) 11/06/2014    FERRITIN 834 (H) 11/06/2014     CMP  Sodium   Date Value Ref Range Status   06/04/2018 139 136 - 145 mmol/L Final     Potassium   Date Value Ref Range Status   06/04/2018 4.0 3.5 - 5.1 mmol/L Final     Chloride   Date Value Ref Range Status   06/04/2018 106 95 - 110 mmol/L Final     CO2   Date Value Ref Range Status   06/04/2018 22 (L) 23 - 29 mmol/L Final     Glucose   Date Value Ref Range Status   06/04/2018 100 70 - 110 mg/dL Final     BUN, Bld   Date Value Ref Range Status   06/04/2018 18 8 - 23 mg/dL Final     Creatinine   Date Value Ref Range Status   06/04/2018 1.7 (H) 0.5 - 1.4 mg/dL Final     Calcium   Date Value Ref Range Status   06/04/2018 7.2 (L) 8.7 - 10.5 mg/dL Final     Total Protein   Date Value Ref Range Status   06/04/2018 7.2 6.0 - 8.4 g/dL Final     Albumin   Date Value Ref Range Status   06/04/2018 3.7 3.5 - 5.2 g/dL Final   06/04/2018 3.7 3.5 - 5.2 g/dL Final     Total Bilirubin   Date Value Ref Range Status   06/04/2018 0.3 0.1 - 1.0 mg/dL Final     Comment:     For infants and newborns, interpretation of results should be based  on gestational age, weight and in agreement with clinical  observations.  Premature Infant recommended reference  ranges:  Up to 24 hours.............<8.0 mg/dL  Up to 48 hours............<12.0 mg/dL  3-5 days..................<15.0 mg/dL  6-29 days.................<15.0 mg/dL       Alkaline Phosphatase   Date Value Ref Range Status   06/04/2018 77 55 - 135 U/L Final     AST   Date Value Ref Range Status   06/04/2018 24 10 - 40 U/L Final     ALT   Date Value Ref Range Status   06/04/2018 23 10 - 44 U/L Final     Anion Gap   Date Value Ref Range Status   06/04/2018 11 8 - 16 mmol/L Final     eGFR if    Date Value Ref Range Status   06/04/2018 48.5 (A) >60 mL/min/1.73 m^2 Final     eGFR if non    Date Value Ref Range Status   06/04/2018 42.0 (A) >60 mL/min/1.73 m^2 Final     Comment:     Calculation used to obtain the estimated glomerular filtration  rate (eGFR) is the CKD-EPI equation.        Lab Results   Component Value Date    LDLCALC 63.6 06/18/2018     Lab Results   Component Value Date    HGBA1C 5.8 (H) 10/02/2017     Lab Results   Component Value Date    TSH 1.454 10/02/2017     Lab Results   Component Value Date    INR 2.0 04/18/2018    INR 2.5 03/14/2018    INR 2.9 (H) 02/09/2018         ASSESSMENT/PLAN     Ibrahima Phelps is a 62 y.o. male with  Ibrahima was seen today for establish care.    Diagnoses and all orders for this visit:    Visit for annual health examination  History and physical exam completed.  Health maintenance reviewed as above.    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  Cont meds.  Not on ACEi 2/2 allergy.  Cont f/u c nephro and transplant.    Mixed hyperlipidemia  Lab Results   Component Value Date    LDLCALC 63.6 06/18/2018   at goal on lipitor, cont  meds    (HFpEF) heart failure with preserved ejection fraction  Coronary artery disease involving native coronary artery of native heart without angina pectoris  Paroxysmal atrial fibrillation  Long term (current) use of anticoagulants [V58.61]  Followed by Dr. Merlene PABLO today on exam.  Cont meds as per Cards.  Due for f/u in Coumadin clinic, last INR at goal in April, will schedule at check out desk today.  Last EF wnl and currently euvolemic.    Other emphysema  Tobacco use  Mild obstruction on PFTs 3/2017  Currently asymptomatic.  Discussed potential sx and consider meds if future if needed.  Patient counseled on the need to stop smoking.  Will refer back to smoking cessation for f/u.  -     Ambulatory referral to Smoking Cessation Program    Thrombocytopenia  Normalized on last few checks, no acute issues.    Prediabetes  Lab Results   Component Value Date    HGBA1C 5.8 (H) 10/02/2017     Mild, likely due to prednisone use, cont diet modifications.    Intention tremor  Noted on exam, discussed potential s/e of Gabapentin but sx have occurred even when not on meds  -     Ambulatory referral to Neurology     as above    RTC in 6-12 months, sooner if needed and depending on labs.    Connie Magdaleno MD  Department of Internal Medicine - [facility]  07/30/2018

## 2018-07-31 ENCOUNTER — TELEPHONE (OUTPATIENT)
Dept: INTERNAL MEDICINE | Facility: CLINIC | Age: 63
End: 2018-07-31

## 2018-07-31 NOTE — TELEPHONE ENCOUNTER
----- Message from Arielle Ibanez sent at 7/31/2018 12:55 PM CDT -----  Contact: Self [phone]  Pt states, Do the doctor will like for him to start back taking potassium chloride SA (JORDAN,KLOR-CON) because he haven't take that medication in bout three years.

## 2018-07-31 NOTE — TELEPHONE ENCOUNTER
Spoke with pt and he states he have not taking potassium in over 3 years . Would you like to restart it or d/c

## 2018-08-09 DIAGNOSIS — Z79.01 LONG TERM CURRENT USE OF ANTICOAGULANT THERAPY: ICD-10-CM

## 2018-08-09 DIAGNOSIS — I48.91 ATRIAL FIBRILLATION WITH RAPID VENTRICULAR RESPONSE: ICD-10-CM

## 2018-08-10 RX ORDER — WARFARIN SODIUM 5 MG/1
5 TABLET ORAL DAILY
Qty: 7 TABLET | Refills: 0 | Status: SHIPPED | OUTPATIENT
Start: 2018-08-10 | End: 2018-11-12 | Stop reason: SDUPTHER

## 2018-08-13 ENCOUNTER — ANTI-COAG VISIT (OUTPATIENT)
Dept: CARDIOLOGY | Facility: CLINIC | Age: 63
End: 2018-08-13

## 2018-08-13 ENCOUNTER — LAB VISIT (OUTPATIENT)
Dept: LAB | Facility: HOSPITAL | Age: 63
End: 2018-08-13
Attending: INTERNAL MEDICINE
Payer: COMMERCIAL

## 2018-08-13 DIAGNOSIS — Z79.01 LONG TERM (CURRENT) USE OF ANTICOAGULANTS: ICD-10-CM

## 2018-08-13 LAB
INR PPP: 1.8
PROTHROMBIN TIME: 17.8 SEC

## 2018-08-13 PROCEDURE — 36415 COLL VENOUS BLD VENIPUNCTURE: CPT

## 2018-08-13 PROCEDURE — 85610 PROTHROMBIN TIME: CPT

## 2018-09-03 RX ORDER — PREDNISONE 5 MG/1
5 TABLET ORAL EVERY MORNING
Qty: 90 TABLET | Refills: 1 | Status: SHIPPED | OUTPATIENT
Start: 2018-09-03 | End: 2019-02-26 | Stop reason: SDUPTHER

## 2018-09-04 ENCOUNTER — ANTI-COAG VISIT (OUTPATIENT)
Dept: CARDIOLOGY | Facility: CLINIC | Age: 63
End: 2018-09-04
Payer: COMMERCIAL

## 2018-09-04 DIAGNOSIS — Z79.01 LONG TERM (CURRENT) USE OF ANTICOAGULANTS: Primary | ICD-10-CM

## 2018-09-04 LAB — INR PPP: 3.1 (ref 2–3)

## 2018-09-04 PROCEDURE — 85610 PROTHROMBIN TIME: CPT | Mod: QW,S$GLB,, | Performed by: INTERNAL MEDICINE

## 2018-09-04 PROCEDURE — 99211 OFF/OP EST MAY X REQ PHY/QHP: CPT | Mod: 25,S$GLB,, | Performed by: INTERNAL MEDICINE

## 2018-09-04 NOTE — PROGRESS NOTES
Quick follow up for subtherapeutic INR on 8/13. INR slightly elevated today for no apparent reason. Patient reports occasional bruising from use. He denies any other changes. Decreased dose advised for tonight then resume. Follow up in 3 weeks. Patient reminded to call with questions or concerns. Care plan made with A Mendez Pharm D

## 2018-09-10 DIAGNOSIS — N18.30 CKD (CHRONIC KIDNEY DISEASE), STAGE III: ICD-10-CM

## 2018-09-10 RX ORDER — MYCOPHENOLATE MOFETIL 250 MG/1
CAPSULE ORAL
Qty: 120 CAPSULE | Refills: 0 | Status: SHIPPED | OUTPATIENT
Start: 2018-09-10 | End: 2018-10-10 | Stop reason: SDUPTHER

## 2018-09-21 ENCOUNTER — ANTI-COAG VISIT (OUTPATIENT)
Dept: CARDIOLOGY | Facility: CLINIC | Age: 63
End: 2018-09-21
Payer: COMMERCIAL

## 2018-09-21 DIAGNOSIS — Z79.01 LONG TERM (CURRENT) USE OF ANTICOAGULANTS: Primary | ICD-10-CM

## 2018-09-21 LAB — INR PPP: 2.5 (ref 2–3)

## 2018-09-21 PROCEDURE — 85610 PROTHROMBIN TIME: CPT | Mod: QW,S$GLB,, | Performed by: INTERNAL MEDICINE

## 2018-09-21 NOTE — PROGRESS NOTES
Quick follow-up for elevated INR 9/4. INR within range today  Will maintain and follow up in 4 weeks at [facility] lab.  Patient with bruises from use, denies any bleeding or changes. Advised patient to call with any changes or concerns. BE Mcfarland D assisted with dosing

## 2018-10-04 RX ORDER — ATORVASTATIN CALCIUM 10 MG/1
10 TABLET, FILM COATED ORAL DAILY
Qty: 90 TABLET | Refills: 3 | Status: SHIPPED | OUTPATIENT
Start: 2018-10-04 | End: 2019-02-26 | Stop reason: SDUPTHER

## 2018-10-10 DIAGNOSIS — N18.30 CKD (CHRONIC KIDNEY DISEASE), STAGE III: ICD-10-CM

## 2018-10-10 RX ORDER — MYCOPHENOLATE MOFETIL 250 MG/1
CAPSULE ORAL
Qty: 120 CAPSULE | Refills: 11 | Status: SHIPPED | OUTPATIENT
Start: 2018-10-10 | End: 2018-10-13 | Stop reason: SDUPTHER

## 2018-10-13 DIAGNOSIS — N18.30 CKD (CHRONIC KIDNEY DISEASE), STAGE III: ICD-10-CM

## 2018-10-13 RX ORDER — MYCOPHENOLATE MOFETIL 250 MG/1
CAPSULE ORAL
Qty: 120 CAPSULE | Refills: 11 | Status: SHIPPED | OUTPATIENT
Start: 2018-10-13 | End: 2019-09-25 | Stop reason: SDUPTHER

## 2018-10-15 ENCOUNTER — ANTI-COAG VISIT (OUTPATIENT)
Dept: CARDIOLOGY | Facility: CLINIC | Age: 63
End: 2018-10-15

## 2018-10-15 ENCOUNTER — LAB VISIT (OUTPATIENT)
Dept: LAB | Facility: HOSPITAL | Age: 63
End: 2018-10-15
Attending: INTERNAL MEDICINE
Payer: COMMERCIAL

## 2018-10-15 DIAGNOSIS — Z94.0 KIDNEY TRANSPLANT RECIPIENT: ICD-10-CM

## 2018-10-15 DIAGNOSIS — Z79.01 LONG TERM (CURRENT) USE OF ANTICOAGULANTS: ICD-10-CM

## 2018-10-15 DIAGNOSIS — Z79.01 LONG TERM CURRENT USE OF ANTICOAGULANT THERAPY: ICD-10-CM

## 2018-10-15 DIAGNOSIS — I48.91 ATRIAL FIBRILLATION WITH RAPID VENTRICULAR RESPONSE: ICD-10-CM

## 2018-10-15 DIAGNOSIS — R73.9 HYPERGLYCEMIA: ICD-10-CM

## 2018-10-15 LAB
ALBUMIN SERPL BCP-MCNC: 3.5 G/DL
ALBUMIN SERPL BCP-MCNC: 3.5 G/DL
ALP SERPL-CCNC: 80 U/L
ALT SERPL W/O P-5'-P-CCNC: 19 U/L
ANION GAP SERPL CALC-SCNC: 11 MMOL/L
AST SERPL-CCNC: 23 U/L
BASOPHILS # BLD AUTO: 0.02 K/UL
BASOPHILS NFR BLD: 0.3 %
BILIRUB DIRECT SERPL-MCNC: 0.1 MG/DL
BILIRUB SERPL-MCNC: 0.3 MG/DL
BUN SERPL-MCNC: 21 MG/DL
CALCIUM SERPL-MCNC: 8.5 MG/DL
CHLORIDE SERPL-SCNC: 106 MMOL/L
CO2 SERPL-SCNC: 24 MMOL/L
CREAT SERPL-MCNC: 1.8 MG/DL
DIFFERENTIAL METHOD: ABNORMAL
EOSINOPHIL # BLD AUTO: 0.3 K/UL
EOSINOPHIL NFR BLD: 4.6 %
ERYTHROCYTE [DISTWIDTH] IN BLOOD BY AUTOMATED COUNT: 14.2 %
EST. GFR  (AFRICAN AMERICAN): 45.3 ML/MIN/1.73 M^2
EST. GFR  (NON AFRICAN AMERICAN): 39.2 ML/MIN/1.73 M^2
ESTIMATED AVG GLUCOSE: 117 MG/DL
GLUCOSE SERPL-MCNC: 106 MG/DL
HBA1C MFR BLD HPLC: 5.7 %
HCT VFR BLD AUTO: 40.8 %
HGB BLD-MCNC: 12.8 G/DL
IMM GRANULOCYTES # BLD AUTO: 0.03 K/UL
IMM GRANULOCYTES NFR BLD AUTO: 0.4 %
INR PPP: 2.7
LYMPHOCYTES # BLD AUTO: 1.7 K/UL
LYMPHOCYTES NFR BLD: 23.5 %
MCH RBC QN AUTO: 26.3 PG
MCHC RBC AUTO-ENTMCNC: 31.4 G/DL
MCV RBC AUTO: 84 FL
MONOCYTES # BLD AUTO: 0.7 K/UL
MONOCYTES NFR BLD: 9.3 %
NEUTROPHILS # BLD AUTO: 4.6 K/UL
NEUTROPHILS NFR BLD: 61.9 %
NRBC BLD-RTO: 0 /100 WBC
PHOSPHATE SERPL-MCNC: 2.9 MG/DL
PLATELET # BLD AUTO: 162 K/UL
PMV BLD AUTO: 12.5 FL
POTASSIUM SERPL-SCNC: 3.8 MMOL/L
PROT SERPL-MCNC: 7.3 G/DL
PROTHROMBIN TIME: 25.8 SEC
PTH-INTACT SERPL-MCNC: 54 PG/ML
RBC # BLD AUTO: 4.86 M/UL
SODIUM SERPL-SCNC: 141 MMOL/L
TACROLIMUS BLD-MCNC: 6.5 NG/ML
WBC # BLD AUTO: 7.35 K/UL

## 2018-10-15 PROCEDURE — 82247 BILIRUBIN TOTAL: CPT

## 2018-10-15 PROCEDURE — 36415 COLL VENOUS BLD VENIPUNCTURE: CPT

## 2018-10-15 PROCEDURE — 85025 COMPLETE CBC W/AUTO DIFF WBC: CPT

## 2018-10-15 PROCEDURE — 85610 PROTHROMBIN TIME: CPT

## 2018-10-15 PROCEDURE — 80069 RENAL FUNCTION PANEL: CPT

## 2018-10-15 PROCEDURE — 84155 ASSAY OF PROTEIN SERUM: CPT

## 2018-10-15 PROCEDURE — 80197 ASSAY OF TACROLIMUS: CPT

## 2018-10-15 PROCEDURE — 83970 ASSAY OF PARATHORMONE: CPT

## 2018-10-15 PROCEDURE — 83036 HEMOGLOBIN GLYCOSYLATED A1C: CPT

## 2018-10-27 RX ORDER — DOXAZOSIN 4 MG/1
TABLET ORAL
Qty: 270 TABLET | Refills: 3 | Status: SHIPPED | OUTPATIENT
Start: 2018-10-27 | End: 2019-02-26 | Stop reason: SDUPTHER

## 2018-11-12 ENCOUNTER — OFFICE VISIT (OUTPATIENT)
Dept: NEPHROLOGY | Facility: CLINIC | Age: 63
End: 2018-11-12
Payer: COMMERCIAL

## 2018-11-12 ENCOUNTER — ANTI-COAG VISIT (OUTPATIENT)
Dept: CARDIOLOGY | Facility: CLINIC | Age: 63
End: 2018-11-12
Payer: COMMERCIAL

## 2018-11-12 VITALS
WEIGHT: 229.94 LBS | SYSTOLIC BLOOD PRESSURE: 128 MMHG | BODY MASS INDEX: 30.47 KG/M2 | OXYGEN SATURATION: 98 % | HEART RATE: 66 BPM | DIASTOLIC BLOOD PRESSURE: 60 MMHG | HEIGHT: 73 IN

## 2018-11-12 DIAGNOSIS — D50.8 OTHER IRON DEFICIENCY ANEMIA: ICD-10-CM

## 2018-11-12 DIAGNOSIS — I13.0 HYPERTENSIVE HEART AND RENAL DISEASE WITH RENAL FAILURE, STAGE 1 THROUGH STAGE 4 OR UNSPECIFIED CHRONIC KIDNEY DISEASE, WITH HEART FAILURE: ICD-10-CM

## 2018-11-12 DIAGNOSIS — Z94.0 KIDNEY TRANSPLANT RECIPIENT: Primary | ICD-10-CM

## 2018-11-12 DIAGNOSIS — Z79.899 IMMUNODEFICIENCY DUE TO TREATMENT WITH IMMUNOSUPPRESSIVE MEDICATION: ICD-10-CM

## 2018-11-12 DIAGNOSIS — E55.9 VITAMIN D DEFICIENCY: ICD-10-CM

## 2018-11-12 DIAGNOSIS — K57.90 DIVERTICULOSIS OF INTESTINE WITHOUT BLEEDING, UNSPECIFIED INTESTINAL TRACT LOCATION: ICD-10-CM

## 2018-11-12 DIAGNOSIS — I25.10 CORONARY ARTERY DISEASE INVOLVING NATIVE CORONARY ARTERY OF NATIVE HEART WITHOUT ANGINA PECTORIS: ICD-10-CM

## 2018-11-12 DIAGNOSIS — I48.0 PAROXYSMAL ATRIAL FIBRILLATION: ICD-10-CM

## 2018-11-12 DIAGNOSIS — Z79.01 LONG TERM (CURRENT) USE OF ANTICOAGULANTS: Primary | ICD-10-CM

## 2018-11-12 DIAGNOSIS — J43.8 OTHER EMPHYSEMA: ICD-10-CM

## 2018-11-12 DIAGNOSIS — Z79.01 LONG TERM CURRENT USE OF ANTICOAGULANT THERAPY: ICD-10-CM

## 2018-11-12 DIAGNOSIS — I48.91 ATRIAL FIBRILLATION WITH RAPID VENTRICULAR RESPONSE: ICD-10-CM

## 2018-11-12 DIAGNOSIS — Z72.0 TOBACCO USE: ICD-10-CM

## 2018-11-12 DIAGNOSIS — E66.9 OBESITY (BMI 30-39.9): ICD-10-CM

## 2018-11-12 DIAGNOSIS — N18.30 CKD (CHRONIC KIDNEY DISEASE) STAGE 3, GFR 30-59 ML/MIN: ICD-10-CM

## 2018-11-12 DIAGNOSIS — M89.8X9 METABOLIC BONE DISEASE: ICD-10-CM

## 2018-11-12 DIAGNOSIS — N25.81 SECONDARY RENAL HYPERPARATHYROIDISM: ICD-10-CM

## 2018-11-12 DIAGNOSIS — Z79.01 LONG TERM (CURRENT) USE OF ANTICOAGULANTS: ICD-10-CM

## 2018-11-12 DIAGNOSIS — D84.821 IMMUNODEFICIENCY DUE TO TREATMENT WITH IMMUNOSUPPRESSIVE MEDICATION: ICD-10-CM

## 2018-11-12 DIAGNOSIS — Z94.0 H/O KIDNEY TRANSPLANT: ICD-10-CM

## 2018-11-12 DIAGNOSIS — E78.2 MIXED HYPERLIPIDEMIA: ICD-10-CM

## 2018-11-12 DIAGNOSIS — Z86.79 HISTORY OF PERICARDITIS: ICD-10-CM

## 2018-11-12 DIAGNOSIS — R73.03 PREDIABETES: ICD-10-CM

## 2018-11-12 LAB — INR PPP: 2.7 (ref 2–3)

## 2018-11-12 PROCEDURE — 99211 OFF/OP EST MAY X REQ PHY/QHP: CPT | Mod: 25,S$GLB,, | Performed by: INTERNAL MEDICINE

## 2018-11-12 PROCEDURE — 99214 OFFICE O/P EST MOD 30 MIN: CPT | Mod: S$GLB,,, | Performed by: INTERNAL MEDICINE

## 2018-11-12 PROCEDURE — 3078F DIAST BP <80 MM HG: CPT | Mod: CPTII,S$GLB,, | Performed by: INTERNAL MEDICINE

## 2018-11-12 PROCEDURE — 3008F BODY MASS INDEX DOCD: CPT | Mod: CPTII,S$GLB,, | Performed by: INTERNAL MEDICINE

## 2018-11-12 PROCEDURE — 99999 PR PBB SHADOW E&M-EST. PATIENT-LVL III: CPT | Mod: PBBFAC,,, | Performed by: INTERNAL MEDICINE

## 2018-11-12 PROCEDURE — 85610 PROTHROMBIN TIME: CPT | Mod: QW,S$GLB,, | Performed by: INTERNAL MEDICINE

## 2018-11-12 PROCEDURE — 3074F SYST BP LT 130 MM HG: CPT | Mod: CPTII,S$GLB,, | Performed by: INTERNAL MEDICINE

## 2018-11-12 RX ORDER — WARFARIN SODIUM 5 MG/1
5 TABLET ORAL DAILY
Qty: 7 TABLET | Refills: 0 | Status: CANCELLED | OUTPATIENT
Start: 2018-11-12

## 2018-11-12 RX ORDER — WARFARIN SODIUM 5 MG/1
5 TABLET ORAL DAILY
Qty: 30 TABLET | Refills: 2 | Status: SHIPPED | OUTPATIENT
Start: 2018-11-12 | End: 2019-02-25 | Stop reason: SDUPTHER

## 2018-11-12 RX ORDER — AMOXICILLIN AND CLAVULANATE POTASSIUM 250; 125 MG/1; MG/1
1 TABLET, FILM COATED ORAL EVERY 8 HOURS
Qty: 21 TABLET | Refills: 0 | Status: SHIPPED | OUTPATIENT
Start: 2018-11-12 | End: 2018-11-19

## 2018-11-12 NOTE — PROGRESS NOTES
Pt seen by Allyson SHARP. I have reviewed her initial findings and agree with her assessment and plan

## 2018-11-12 NOTE — PROGRESS NOTES
INR within therapeutic range today.Patient reports cold symptoms since Thursday. He will be seeing his PCP today regarding this change in health. He denies any bleeding, bruising or other changes that would affect warfarin therapy. Will maintain current dose and follow up in 5 weeks. Patient advised to call coumadin clinic with any changes or concerns. Care plan made with J Cordaro Pharm D.

## 2018-11-17 NOTE — PROGRESS NOTES
Subjective:       Patient ID: Ibrahima Phelps is a 63 y.o. Black or  male who presents for follow-up evaluation of Chronic Kidney Disease and Kidney Transplant    HPI     He is getting over an URI, has not 'gone to his chest' but now feeling better. He continues to smoke a few cigarettes a day. Off PPI for several months now and without dyspepsia. No LE edema and no SOB.  He's noticed some allograft 'twinges' but no overt pain but no problems with urination. His wife is still working at [employer] as a manager of housing for residents.     Review of Systems   Constitutional: Negative for activity change, appetite change, fatigue, fever and unexpected weight change.   HENT: Positive for congestion, postnasal drip and rhinorrhea. Negative for facial swelling.    Respiratory: Positive for cough. Negative for shortness of breath.    Cardiovascular: Negative for chest pain and leg swelling.   Gastrointestinal: Negative for abdominal pain (allograft 'twinges').   Genitourinary: Negative for decreased urine volume, difficulty urinating, dysuria, frequency and hematuria.   Musculoskeletal: Positive for arthralgias.   Skin: Negative for rash.   Neurological: Negative for weakness and headaches.   Hematological: Does not bruise/bleed easily.   Psychiatric/Behavioral: Negative for decreased concentration and sleep disturbance.       Objective:      Physical Exam   Constitutional: He is oriented to person, place, and time. He appears well-developed and well-nourished. No distress.   Eyes: No scleral icterus.   Neck: No JVD present.   Cardiovascular: Normal rate, S1 normal and S2 normal. Exam reveals no friction rub.   Pulmonary/Chest: Effort normal. He has no wheezes. He has no rales.   Abdominal: Soft. There is no tenderness.   Musculoskeletal: He exhibits no edema.   Neurological: He is alert and oriented to person, place, and time.   Skin: Skin is warm and dry.   Psychiatric: He has a normal mood and affect.    Nursing note and vitals reviewed.      Assessment:       1. Kidney transplant recipient    2. Vitamin D deficiency    3. Other iron deficiency anemia    4. CKD (chronic kidney disease) stage 3, GFR 30-59 ml/min    5. H/O kidney transplant    6. Immunodeficiency due to treatment with immunosuppressive medication    7. 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 Paroxysmal atrial fibrillation    9. Mixed hyperlipidemia    10. Other emphysema    11. Coronary artery disease involving native coronary artery of native heart without angina pectoris    12. History of pericarditis    13. Long term (current) use of anticoagulants [V58.61]    14. Secondary renal hyperparathyroidism    15. Prediabetes    16. Obesity (BMI 30-39.9)    17. Metabolic bone disease    18. Diverticulosis of intestine without bleeding, unspecified intestinal tract location    19. Tobacco use        Plan:             ESRD s/p kidney transplsant with resultant CKD. Stable kidney function. Tac level at goal. Schedule kidney txp u/s     HTN is controlled    PreDiabetes--better and stable    Mineral and Bone Disease--continue current treatment with calcium tablets, Zemplar and D3    Back pain--no NSAIDs, OK for gabapentin    URI--improving per symptoms. Discussed symptoms to call/urgent care/ER    Tobacco use--counseled, again      RTC 4 months with labs prior

## 2018-11-27 RX ORDER — SPIRONOLACTONE 25 MG/1
25 TABLET ORAL DAILY
Qty: 90 TABLET | Refills: 3 | Status: SHIPPED | OUTPATIENT
Start: 2018-11-27 | End: 2019-02-26 | Stop reason: SDUPTHER

## 2018-11-27 RX ORDER — METOPROLOL TARTRATE 50 MG/1
50 TABLET ORAL 2 TIMES DAILY
Qty: 180 TABLET | Refills: 3 | Status: SHIPPED | OUTPATIENT
Start: 2018-11-27 | End: 2019-02-26 | Stop reason: SDUPTHER

## 2018-12-14 ENCOUNTER — ANTI-COAG VISIT (OUTPATIENT)
Dept: CARDIOLOGY | Facility: CLINIC | Age: 63
End: 2018-12-14
Payer: COMMERCIAL

## 2018-12-14 ENCOUNTER — PATIENT MESSAGE (OUTPATIENT)
Dept: NEPHROLOGY | Facility: CLINIC | Age: 63
End: 2018-12-14

## 2018-12-14 ENCOUNTER — HOSPITAL ENCOUNTER (OUTPATIENT)
Dept: RADIOLOGY | Facility: HOSPITAL | Age: 63
Discharge: HOME | End: 2018-12-14
Attending: INTERNAL MEDICINE
Payer: COMMERCIAL

## 2018-12-14 DIAGNOSIS — Z79.01 LONG TERM (CURRENT) USE OF ANTICOAGULANTS: Primary | ICD-10-CM

## 2018-12-14 DIAGNOSIS — Z94.0 KIDNEY TRANSPLANT RECIPIENT: ICD-10-CM

## 2018-12-14 LAB — INR PPP: 2.2 (ref 2–3)

## 2018-12-14 PROCEDURE — 76776 US EXAM K TRANSPL W/DOPPLER: CPT | Mod: 26,,, | Performed by: RADIOLOGY

## 2018-12-14 PROCEDURE — 99211 OFF/OP EST MAY X REQ PHY/QHP: CPT | Mod: 25,S$GLB,, | Performed by: PHARMACIST

## 2018-12-14 PROCEDURE — 76776 US EXAM K TRANSPL W/DOPPLER: CPT | Mod: TC

## 2018-12-14 PROCEDURE — 85610 PROTHROMBIN TIME: CPT | Mod: QW,S$GLB,, | Performed by: INTERNAL MEDICINE

## 2018-12-14 NOTE — PROGRESS NOTES
INR within therapeutic range today. Patient reports taking Amoxicillin 11/2-11/19.  He denies any bleeding or other changes that would affect warfarin therapy. Will maintain current dose and follow up in 4 weeks. Patient advised to call coumadin clinic with any changes or concerns. Care plan made with J Cordaro Pharm D.

## 2019-01-11 ENCOUNTER — ANTI-COAG VISIT (OUTPATIENT)
Dept: CARDIOLOGY | Facility: CLINIC | Age: 64
End: 2019-01-11
Payer: COMMERCIAL

## 2019-01-11 ENCOUNTER — OFFICE VISIT (OUTPATIENT)
Dept: CARDIOLOGY | Facility: CLINIC | Age: 64
End: 2019-01-11
Payer: COMMERCIAL

## 2019-01-11 ENCOUNTER — HOSPITAL ENCOUNTER (OUTPATIENT)
Dept: CARDIOLOGY | Facility: CLINIC | Age: 64
Discharge: HOME | End: 2019-01-11
Payer: COMMERCIAL

## 2019-01-11 ENCOUNTER — LAB VISIT (OUTPATIENT)
Dept: LAB | Facility: HOSPITAL | Age: 64
End: 2019-01-11
Attending: INTERNAL MEDICINE
Payer: COMMERCIAL

## 2019-01-11 VITALS
DIASTOLIC BLOOD PRESSURE: 80 MMHG | WEIGHT: 228.63 LBS | SYSTOLIC BLOOD PRESSURE: 146 MMHG | BODY MASS INDEX: 30.3 KG/M2 | HEART RATE: 62 BPM | OXYGEN SATURATION: 99 % | HEIGHT: 73 IN

## 2019-01-11 DIAGNOSIS — Z79.899 IMMUNODEFICIENCY DUE TO TREATMENT WITH IMMUNOSUPPRESSIVE MEDICATION: ICD-10-CM

## 2019-01-11 DIAGNOSIS — Z72.0 TOBACCO USE: ICD-10-CM

## 2019-01-11 DIAGNOSIS — I50.30 (HFPEF) HEART FAILURE WITH PRESERVED EJECTION FRACTION: Primary | ICD-10-CM

## 2019-01-11 DIAGNOSIS — Z79.01 LONG TERM (CURRENT) USE OF ANTICOAGULANTS: Primary | ICD-10-CM

## 2019-01-11 DIAGNOSIS — Z94.0 H/O KIDNEY TRANSPLANT: ICD-10-CM

## 2019-01-11 DIAGNOSIS — I25.10 CORONARY ARTERY DISEASE INVOLVING NATIVE CORONARY ARTERY OF NATIVE HEART WITHOUT ANGINA PECTORIS: ICD-10-CM

## 2019-01-11 DIAGNOSIS — R61 NIGHT SWEATS: ICD-10-CM

## 2019-01-11 DIAGNOSIS — D84.821 IMMUNODEFICIENCY DUE TO TREATMENT WITH IMMUNOSUPPRESSIVE MEDICATION: ICD-10-CM

## 2019-01-11 DIAGNOSIS — E78.2 MIXED HYPERLIPIDEMIA: ICD-10-CM

## 2019-01-11 DIAGNOSIS — I48.91 ATRIAL FIBRILLATION WITH RAPID VENTRICULAR RESPONSE: ICD-10-CM

## 2019-01-11 DIAGNOSIS — I48.0 PAROXYSMAL ATRIAL FIBRILLATION: ICD-10-CM

## 2019-01-11 DIAGNOSIS — Z79.01 LONG TERM (CURRENT) USE OF ANTICOAGULANTS: ICD-10-CM

## 2019-01-11 DIAGNOSIS — Z79.01 LONG TERM CURRENT USE OF ANTICOAGULANT THERAPY: ICD-10-CM

## 2019-01-11 LAB — INR PPP: 3.1 (ref 2–3)

## 2019-01-11 PROCEDURE — 36415 COLL VENOUS BLD VENIPUNCTURE: CPT

## 2019-01-11 PROCEDURE — 99999 PR PBB SHADOW E&M-EST. PATIENT-LVL IV: CPT | Mod: PBBFAC,,, | Performed by: INTERNAL MEDICINE

## 2019-01-11 PROCEDURE — 99214 PR OFFICE/OUTPT VISIT, EST, LEVL IV, 30-39 MIN: ICD-10-PCS | Mod: S$GLB,,, | Performed by: INTERNAL MEDICINE

## 2019-01-11 PROCEDURE — 93005 ELECTROCARDIOGRAM TRACING: CPT | Mod: S$GLB,,, | Performed by: INTERNAL MEDICINE

## 2019-01-11 PROCEDURE — 3008F BODY MASS INDEX DOCD: CPT | Mod: CPTII,S$GLB,, | Performed by: INTERNAL MEDICINE

## 2019-01-11 PROCEDURE — 99211 PR OFFICE/OUTPT VISIT, EST, LEVL I: ICD-10-PCS | Mod: 25,S$GLB,, | Performed by: INTERNAL MEDICINE

## 2019-01-11 PROCEDURE — 85610 PROTHROMBIN TIME: CPT | Mod: QW,S$GLB,, | Performed by: INTERNAL MEDICINE

## 2019-01-11 PROCEDURE — 99214 OFFICE O/P EST MOD 30 MIN: CPT | Mod: S$GLB,,, | Performed by: INTERNAL MEDICINE

## 2019-01-11 PROCEDURE — 3077F SYST BP >= 140 MM HG: CPT | Mod: CPTII,S$GLB,, | Performed by: INTERNAL MEDICINE

## 2019-01-11 PROCEDURE — 3077F PR MOST RECENT SYSTOLIC BLOOD PRESSURE >= 140 MM HG: ICD-10-PCS | Mod: CPTII,S$GLB,, | Performed by: INTERNAL MEDICINE

## 2019-01-11 PROCEDURE — 3008F PR BODY MASS INDEX (BMI) DOCUMENTED: ICD-10-PCS | Mod: CPTII,S$GLB,, | Performed by: INTERNAL MEDICINE

## 2019-01-11 PROCEDURE — 93010 ELECTROCARDIOGRAM REPORT: CPT | Mod: S$GLB,,, | Performed by: INTERNAL MEDICINE

## 2019-01-11 PROCEDURE — 99211 OFF/OP EST MAY X REQ PHY/QHP: CPT | Mod: 25,S$GLB,, | Performed by: INTERNAL MEDICINE

## 2019-01-11 PROCEDURE — 93010 EKG 12-LEAD: ICD-10-PCS | Mod: S$GLB,,, | Performed by: INTERNAL MEDICINE

## 2019-01-11 PROCEDURE — 99999 PR PBB SHADOW E&M-EST. PATIENT-LVL IV: ICD-10-PCS | Mod: PBBFAC,,, | Performed by: INTERNAL MEDICINE

## 2019-01-11 PROCEDURE — 3079F DIAST BP 80-89 MM HG: CPT | Mod: CPTII,S$GLB,, | Performed by: INTERNAL MEDICINE

## 2019-01-11 PROCEDURE — 3079F PR MOST RECENT DIASTOLIC BLOOD PRESSURE 80-89 MM HG: ICD-10-PCS | Mod: CPTII,S$GLB,, | Performed by: INTERNAL MEDICINE

## 2019-01-11 PROCEDURE — 87040 BLOOD CULTURE FOR BACTERIA: CPT

## 2019-01-11 PROCEDURE — 93005 EKG 12-LEAD: ICD-10-PCS | Mod: S$GLB,,, | Performed by: INTERNAL MEDICINE

## 2019-01-11 PROCEDURE — 85610 POCT INR: ICD-10-PCS | Mod: QW,S$GLB,, | Performed by: INTERNAL MEDICINE

## 2019-01-11 NOTE — PROGRESS NOTES
Subjective:   Patient ID:  Ibrahima Phelps is a 63 y.o. male who presents for follow-up of Angioedema (fingers on right hand); Shaking (right hand); Headache; Coronary Artery Disease; and Hyperlipidemia      HPI: He is accompanied by his wife. He reports a 3-4 month history of night sweats which are soaking his clothes and bed sheets. He has had a concurrent 10 lb weight loss. His appetite is down. He has been eating one meal per day. He denies fevers or any localized pain. Ibrahima Phelps denies any chest pain, shortness of breath, PND, orthopnea, palpitations, leg edema, or syncope. He has nocturnal swelling in the pads of his first three fingers and reports worsening tremors of his right hand. He is still smoking and is not ready to quit. His last echo was 9/17 and showed an LVEF of 55-60% with a pasp of 39. His last ischemia evaluation was 4/15 and was normal.      Past Medical History:   Diagnosis Date    (HFpEF) heart failure with preserved ejection fraction 9/25/2017    Atrial fibrillation     CAD (coronary artery disease)     Diverticulosis     Fever blister     Heart attack 2006    Hyperlipidemia     Hypertension     Immunodeficiency due to treatment with immunosuppressive medication     Keloid cicatrix     Metabolic bone disease     Pericarditis     S/P kidney transplant     Thrombocytopenia     Thyroid disease     Unspecified disorder of kidney and ureter     Stage IIICKD       Past Surgical History:   Procedure Laterality Date    CARDIOVERSION  04/30/15    CHOLECYSTECTOMY      COLONOSCOPY  April 20, 2011    Diverticulosis, repeat recommended in 3 yrs., repeat colonoscopy 2014 revealed 2 polyps.  He should return in 5 years.    COLONOSCOPY N/A 9/29/2014    Performed by Chon Casper MD at [facility] ([address] [place] [address])    CORONARY ANGIOPLASTY WITH STENT PLACEMENT  9/13/2006    KIDNEY TRANSPLANT  2005    PARATHYROIDECTOMY         Social History     Socioeconomic History    Marital status:       Spouse name: None    Number of children: None    Years of education: None    Highest education level: None   Social Needs    Financial resource strain: None    Food insecurity - worry: None    Food insecurity - inability: None    Transportation needs - medical: None    Transportation needs - non-medical: None   Occupational History    Occupation:    Tobacco Use    Smoking status: Current Every Day Smoker     Packs/day: 0.50     Years: 40.00     Pack years: 20.00     Types: Cigarettes    Smokeless tobacco: Never Used    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   Substance and Sexual Activity    Alcohol use: No    Drug use: No    Sexual activity: Yes     Partners: Female   Other Topics Concern    None   Social History Narrative    Retired        Family History   Problem Relation Age of Onset    No Known Problems Sister     No Known Problems Brother     Thyroid disease Mother         s/p surgery    Heart disease Father         had pacemaker    No Known Problems Sister     Kidney failure Sister     Kidney disease Sister     No Known Problems Sister     Kidney disease Brother     Kidney failure Brother         s/p transplant    Diabetes Mellitus Neg Hx     Stroke Neg Hx     Heart attack Neg Hx     Cancer Neg Hx     Celiac disease Neg Hx     Cirrhosis Neg Hx     Colon cancer Neg Hx     Esophageal cancer Neg Hx     Inflammatory bowel disease Neg Hx     Rectal cancer Neg Hx     Stomach cancer Neg Hx     Ulcerative colitis Neg Hx     Liver disease Neg Hx     Liver cancer Neg Hx     Crohn's disease Neg Hx     Melanoma Neg Hx           Medication List           Accurate as of 1/11/19  2:10 PM. If you have any questions, ask your nurse or doctor.               CHANGE how you take these medications    gabapentin 300 MG capsule  Commonly known as:  NEURONTIN  1 po q hs x 3 days, then 1 po bid x 3 days, then  1 po tid.  What changed:    · when to take this  · reasons to take this  · additional instructions        CONTINUE taking these medications    ADULT ASPIRIN EC LOW STRENGTH 81 MG EC tablet  Generic drug:  aspirin     ALLEGRA 60 MG tablet  Generic drug:  fexofenadine     atorvastatin 10 MG tablet  Commonly known as:  LIPITOR  TAKE 1 TABLET (10 MG TOTAL) BY MOUTH ONCE DAILY.     calcium carbonate 500 mg calcium (1,250 mg) tablet  Commonly known as:  OS-TRISH  Two by mouth twice a day     doxazosin 4 MG tablet  Commonly known as:  CARDURA  TAKE 1.5 TABLETS (6 MG TOTAL) BY MOUTH 2 (TWO) TIMES DAILY.     furosemide 40 MG tablet  Commonly known as:  LASIX  TAKE 0.5 TABLETS (20 MG TOTAL) BY MOUTH 2 (TWO) TIMES DAILY.     metoprolol tartrate 50 MG tablet  Commonly known as:  LOPRESSOR  TAKE 1 TABLET (50 MG TOTAL) BY MOUTH 2 (TWO) TIMES DAILY.     multivitamin per tablet  Commonly known as:  THERAGRAN     mycophenolate 250 mg Cap  Commonly known as:  CELLCEPT  TAKE 2 CAPSULES BY MOUTH EVERY 12 HOURS     NIFEdipine 60 MG Tbsr  Commonly known as:  ADALAT CC  TAKE 1 TABLET BY MOUTH TWICE DAILY.     paricalcitol 1 MCG capsule  Commonly known as:  ZEMPLAR  One po MWF     predniSONE 5 MG tablet  Commonly known as:  DELTASONE  TAKE 1 TABLET (5 MG TOTAL) BY MOUTH EVERY MORNING.     ranitidine 150 MG tablet  Commonly known as:  ZANTAC  Take 1 tablet (150 mg total) by mouth 2 (two) times daily.     spironolactone 25 MG tablet  Commonly known as:  ALDACTONE  TAKE 1 TABLET (25 MG TOTAL) BY MOUTH ONCE DAILY.     tacrolimus 1 MG Cap  Commonly known as:  PROGRAF  TAKE 4 CAPSULES BY MOUTH IN THE MORNING AND THEN TAKE 3 CAPSULES BY MOUTH IN THE EVENING     triamcinolone acetonide 0.1% 0.1 % cream  Commonly known as:  KENALOG  AAA bid to rash on ankles     vitamin D 1000 units Tab  Commonly known as:  VITAMIN D3     warfarin 5 MG tablet  Commonly known as:  COUMADIN  Take 1 tablet (5 mg total) by mouth Daily.            Review of Systems  "  Constitution: Positive for night sweats and weight loss. Negative for weakness, malaise/fatigue and weight gain.   HENT: Negative for hearing loss.    Eyes: Negative for visual disturbance.   Cardiovascular: Negative for chest pain, claudication, dyspnea on exertion, leg swelling, near-syncope, orthopnea, palpitations, paroxysmal nocturnal dyspnea and syncope.   Respiratory: Negative for cough, shortness of breath, sleep disturbances due to breathing, snoring and wheezing.    Endocrine: Negative for cold intolerance, heat intolerance, polydipsia, polyphagia and polyuria.   Hematologic/Lymphatic: Negative for bleeding problem. Does not bruise/bleed easily.   Skin: Negative for rash and suspicious lesions.   Musculoskeletal: Negative for arthritis, falls, joint pain, muscle weakness and myalgias.   Gastrointestinal: Negative for abdominal pain, change in bowel habit, constipation, diarrhea, heartburn, hematochezia, melena and nausea.   Genitourinary: Negative for hematuria and nocturia.   Neurological: Positive for tremors. Negative for excessive daytime sleepiness, dizziness, headaches, light-headedness and loss of balance.   Psychiatric/Behavioral: Negative for depression. The patient is not nervous/anxious.    Allergic/Immunologic: Negative for environmental allergies.       BP (!) 146/80   Pulse 62   Ht 6' 1" (1.854 m)   Wt 103.7 kg (228 lb 9.9 oz)   SpO2 99%   BMI 30.16 kg/m²     Objective:   Physical Exam   Constitutional: He is oriented to person, place, and time. He appears well-developed and well-nourished.        HENT:   Head: Normocephalic and atraumatic.   Mouth/Throat: Oropharynx is clear and moist.   Eyes: Conjunctivae and EOM are normal. Pupils are equal, round, and reactive to light. No scleral icterus.   Neck: Normal range of motion. Neck supple. No hepatojugular reflux and no JVD present. No tracheal deviation present. No thyromegaly present.   Cardiovascular: Normal rate, regular rhythm, " normal heart sounds and intact distal pulses. PMI is not displaced.   Pulses:       Carotid pulses are 2+ on the right side, and 2+ on the left side.       Radial pulses are 2+ on the right side, and 2+ on the left side.        Dorsalis pedis pulses are 2+ on the right side, and 2+ on the left side.        Posterior tibial pulses are 2+ on the right side, and 2+ on the left side.   Pulmonary/Chest: Effort normal and breath sounds normal.   Abdominal: Soft. Bowel sounds are normal. He exhibits no distension and no mass. There is no hepatosplenomegaly. There is no tenderness.   Musculoskeletal: He exhibits no edema or tenderness.   Lymphadenopathy:     He has no cervical adenopathy.   Neurological: He is alert and oriented to person, place, and time. He displays tremor.   Skin: Skin is warm and dry. No rash noted. No cyanosis or erythema. Nails show no clubbing.   Psychiatric: He has a normal mood and affect. His speech is normal and behavior is normal.       Lab Results   Component Value Date     10/15/2018    K 3.8 10/15/2018     10/15/2018    CO2 24 10/15/2018    BUN 21 10/15/2018    CREATININE 1.8 (H) 10/15/2018     10/15/2018    HGBA1C 5.7 (H) 10/15/2018    MG 2.0 06/04/2018    AST 23 10/15/2018    ALT 19 10/15/2018    ALBUMIN 3.5 10/15/2018    ALBUMIN 3.5 10/15/2018    PROT 7.3 10/15/2018    BILITOT 0.3 10/15/2018    WBC 7.35 10/15/2018    HGB 12.8 (L) 10/15/2018    HCT 40.8 10/15/2018    MCV 84 10/15/2018     10/15/2018    INR 3.1 01/11/2019    INR 2.7 (H) 10/15/2018    TSH 1.454 10/02/2017    CHOL 125 06/18/2018    HDL 38 (L) 06/18/2018    LDLCALC 63.6 06/18/2018    TRIG 117 06/18/2018     (H) 09/24/2017     (H) 09/24/2017       Assessment:     1. (HFpEF) heart failure with preserved ejection fraction : He appears euvolemic and well compensated.   2. Paroxysmal atrial fibrillation : He has been maintaining sinus rhythm. He is anticoagulated with coumadin.   3. Coronary  artery disease involving native coronary artery of native heart without angina pectoris : He is asymptomatic with preserved LVEF. Continue asa and statin therapy.   4. Mixed hyperlipidemia : Lipids are at goal. Continue statin therapy.   5. H/O kidney transplant    6. Immunodeficiency due to treatment with immunosuppressive medication    7. Long term (current) use of anticoagulants [V58.61]    8. Tobacco use : Smoking cessation encouraged.   9. Night sweats : He has had night sweats and weight loss and is immunocompromised. I have ordered three sets of blood cultures and asked him to follow up with Minerva Magdaleno and Contreras for further evaluation.       Plan:     Ibrahima was seen today for angioedema, shaking, headache, coronary artery disease and hyperlipidemia.    Diagnoses and all orders for this visit:    (HFpEF) heart failure with preserved ejection fraction    Paroxysmal atrial fibrillation    Coronary artery disease involving native coronary artery of native heart without angina pectoris  -     EKG 12-lead  -     Blood culture; Future    Mixed hyperlipidemia    H/O kidney transplant    Immunodeficiency due to treatment with immunosuppressive medication    Long term (current) use of anticoagulants [V58.61]    Tobacco use    Night sweats        Thank you for allowing me to participate in this patient's care. Please do not hesitate to contact me with any questions or concerns.

## 2019-01-11 NOTE — PATIENT INSTRUCTIONS
We discussed keeping a log of home blood pressures and notifying the office if it is consistently running above 130/80 mmHg. I have asked him to send me his readings via [facility] in 1-2 weeks.

## 2019-01-11 NOTE — Clinical Note
He's been having three months of night sweats and weight loss. I asked him to follow up with you. I've ordered blood cultures.Shagufta

## 2019-01-11 NOTE — PROGRESS NOTES
INR slightly elevated today. Patient reports that his fingers have been swelling> He will be seeing his cardiologist regarding this changes. He denies any bleeding, bruising or other changes. We advised a decreased dose today then resume his weekly dose. Follow up in 4 weeks. Patient was re-educated on situations that would require placing a call to the [facility], including bleeding or unusual bruising issues, changes in health, diet or medications,upcoming procedures that require warfarin interruption, and missed Coumadin dose(s). Patient expressed understanding that avoidance of consistency with these parameters could cause fluctuations in INR, leading to more frequent visits and increase risk of adverse events. Care plan made with MEENA ISBELL.

## 2019-01-14 ENCOUNTER — TELEPHONE (OUTPATIENT)
Dept: INTERNAL MEDICINE | Facility: CLINIC | Age: 64
End: 2019-01-14

## 2019-01-14 NOTE — TELEPHONE ENCOUNTER
----- Message from Connie Magdaleno MD sent at 1/13/2019  9:25 PM CST -----   Please have pt schedule a f/u appt with me for sx of night sweats and weight loss.  Within the next 2 weeks if we have a slot.  Thanks.  ----- Message -----  From: Shagufta Blunt MD  Sent: 1/11/2019   2:16 PM  To: Connie Magdaleno MD    He's been having three months of night sweats and weight loss. I asked him to follow up with you. I've ordered blood cultures.    Shagufta

## 2019-01-15 ENCOUNTER — PATIENT MESSAGE (OUTPATIENT)
Dept: CARDIOLOGY | Facility: CLINIC | Age: 64
End: 2019-01-15

## 2019-01-15 ENCOUNTER — TELEPHONE (OUTPATIENT)
Dept: INTERNAL MEDICINE | Facility: CLINIC | Age: 64
End: 2019-01-15

## 2019-01-15 NOTE — TELEPHONE ENCOUNTER
----- Message from Rachelle Amado sent at 1/15/2019 12:50 PM CST -----  Contact: [phone]  Type: Returning a call    Who left a message? Mary     When did the practice call? 01/15    Comments: please call and advise, thanks

## 2019-01-16 LAB — BACTERIA BLD CULT: NORMAL

## 2019-01-25 RX ORDER — NIFEDIPINE 60 MG/1
TABLET, EXTENDED RELEASE ORAL
Qty: 180 TABLET | Refills: 3 | Status: SHIPPED | OUTPATIENT
Start: 2019-01-25 | End: 2019-02-26 | Stop reason: SDUPTHER

## 2019-01-29 ENCOUNTER — OFFICE VISIT (OUTPATIENT)
Dept: INTERNAL MEDICINE | Facility: CLINIC | Age: 64
End: 2019-01-29
Payer: COMMERCIAL

## 2019-01-29 VITALS
HEIGHT: 73 IN | BODY MASS INDEX: 29.98 KG/M2 | SYSTOLIC BLOOD PRESSURE: 142 MMHG | HEART RATE: 68 BPM | OXYGEN SATURATION: 98 % | TEMPERATURE: 98 F | WEIGHT: 226.19 LBS | DIASTOLIC BLOOD PRESSURE: 68 MMHG

## 2019-01-29 DIAGNOSIS — I25.10 CORONARY ARTERY DISEASE INVOLVING NATIVE CORONARY ARTERY OF NATIVE HEART WITHOUT ANGINA PECTORIS: ICD-10-CM

## 2019-01-29 DIAGNOSIS — R73.03 PREDIABETES: ICD-10-CM

## 2019-01-29 DIAGNOSIS — I48.0 PAROXYSMAL ATRIAL FIBRILLATION: ICD-10-CM

## 2019-01-29 DIAGNOSIS — N18.30 CKD (CHRONIC KIDNEY DISEASE) STAGE 3, GFR 30-59 ML/MIN: ICD-10-CM

## 2019-01-29 DIAGNOSIS — J43.8 OTHER EMPHYSEMA: ICD-10-CM

## 2019-01-29 DIAGNOSIS — D84.821 IMMUNODEFICIENCY DUE TO TREATMENT WITH IMMUNOSUPPRESSIVE MEDICATION: ICD-10-CM

## 2019-01-29 DIAGNOSIS — R61 NIGHT SWEATS: ICD-10-CM

## 2019-01-29 DIAGNOSIS — Z79.899 IMMUNODEFICIENCY DUE TO TREATMENT WITH IMMUNOSUPPRESSIVE MEDICATION: ICD-10-CM

## 2019-01-29 DIAGNOSIS — Z94.0 H/O KIDNEY TRANSPLANT: ICD-10-CM

## 2019-01-29 DIAGNOSIS — R63.4 WEIGHT LOSS: Primary | ICD-10-CM

## 2019-01-29 DIAGNOSIS — I50.30 (HFPEF) HEART FAILURE WITH PRESERVED EJECTION FRACTION: ICD-10-CM

## 2019-01-29 DIAGNOSIS — E78.2 MIXED HYPERLIPIDEMIA: ICD-10-CM

## 2019-01-29 DIAGNOSIS — I13.0 HYPERTENSIVE HEART AND RENAL DISEASE WITH RENAL FAILURE, STAGE 1 THROUGH STAGE 4 OR UNSPECIFIED CHRONIC KIDNEY DISEASE, WITH HEART FAILURE: ICD-10-CM

## 2019-01-29 DIAGNOSIS — Z12.5 SCREENING PSA (PROSTATE SPECIFIC ANTIGEN): ICD-10-CM

## 2019-01-29 DIAGNOSIS — N25.81 SECONDARY RENAL HYPERPARATHYROIDISM: ICD-10-CM

## 2019-01-29 DIAGNOSIS — Z72.0 TOBACCO USE: ICD-10-CM

## 2019-01-29 DIAGNOSIS — Z12.9 SCREENING FOR CANCER: ICD-10-CM

## 2019-01-29 PROBLEM — S46.811A STRAIN OF RIGHT TRAPEZIUS MUSCLE: Status: RESOLVED | Noted: 2017-09-25 | Resolved: 2019-01-29

## 2019-01-29 PROCEDURE — 3008F BODY MASS INDEX DOCD: CPT | Mod: CPTII,S$GLB,, | Performed by: INTERNAL MEDICINE

## 2019-01-29 PROCEDURE — 99999 PR PBB SHADOW E&M-EST. PATIENT-LVL IV: ICD-10-PCS | Mod: PBBFAC,,, | Performed by: INTERNAL MEDICINE

## 2019-01-29 PROCEDURE — 3077F PR MOST RECENT SYSTOLIC BLOOD PRESSURE >= 140 MM HG: ICD-10-PCS | Mod: CPTII,S$GLB,, | Performed by: INTERNAL MEDICINE

## 2019-01-29 PROCEDURE — 3078F DIAST BP <80 MM HG: CPT | Mod: CPTII,S$GLB,, | Performed by: INTERNAL MEDICINE

## 2019-01-29 PROCEDURE — 3008F PR BODY MASS INDEX (BMI) DOCUMENTED: ICD-10-PCS | Mod: CPTII,S$GLB,, | Performed by: INTERNAL MEDICINE

## 2019-01-29 PROCEDURE — 99214 OFFICE O/P EST MOD 30 MIN: CPT | Mod: S$GLB,,, | Performed by: INTERNAL MEDICINE

## 2019-01-29 PROCEDURE — 99214 PR OFFICE/OUTPT VISIT, EST, LEVL IV, 30-39 MIN: ICD-10-PCS | Mod: S$GLB,,, | Performed by: INTERNAL MEDICINE

## 2019-01-29 PROCEDURE — 3077F SYST BP >= 140 MM HG: CPT | Mod: CPTII,S$GLB,, | Performed by: INTERNAL MEDICINE

## 2019-01-29 PROCEDURE — 3078F PR MOST RECENT DIASTOLIC BLOOD PRESSURE < 80 MM HG: ICD-10-PCS | Mod: CPTII,S$GLB,, | Performed by: INTERNAL MEDICINE

## 2019-01-29 PROCEDURE — 99999 PR PBB SHADOW E&M-EST. PATIENT-LVL IV: CPT | Mod: PBBFAC,,, | Performed by: INTERNAL MEDICINE

## 2019-01-29 NOTE — PROGRESS NOTES
INTERNAL MEDICINE ESTABLISHED PATIENT VISIT NOTE    Subjective:     Chief Complaint: Chills; Tremors (hands); and Weight Loss       Patient ID: Ibrahima Phelps is a 63 y.o. male with  HTN c CKD3 and hx renal transplant x2 in 2002 and 2005 and on immunosuppressive meds and secondary hyperPTH, HLD, A fib, CAD c chronic HF c preserved EF, COPD c baseline DAUGHERTY and tob use, thrombocytopenia, anemia, preDM, GERD, last seen by me 7/2018 to [facility] care from Dr. Miranda, here today for f/u and c/o weight loss.    Has lost about 12 lbs since June.  Also c/o chills.  No fever but reports night sweats for the past 3 mos, not every night.  Reports appetite is unchanged but often has a tendency not to eat when he is busy.  Denies cough.  Denies abd pain.  Denies changes in bowel habits or shape of stools.  No blood in stools.  Denies hematuria or dysuria.   Does reports some increased urinary frequency.  Says he sometimes feels the need to void shortly after he has just voided and unsure if he is not emptying completely.  States his stream his good.  No headache, vision changes or focal deficits.  Denies dizziness or lightheadedness.  Denies chest pain.  Mild sob at baseline from COPD, pt states unchanged.      Past Medical History:  Past Medical History:   Diagnosis Date    (HFpEF) heart failure with preserved ejection fraction 9/25/2017    Atrial fibrillation     CAD (coronary artery disease)     Diverticulosis     Fever blister     Heart attack 2006    Hyperlipidemia     Hypertension     Immunodeficiency due to treatment with immunosuppressive medication     Keloid cicatrix     Metabolic bone disease     Pericarditis     S/P kidney transplant     Thrombocytopenia     Thyroid disease     Unspecified disorder of kidney and ureter     Stage IIICKD       Home Medications:  Prior to Admission medications    Medication Sig Start Date End Date Taking? Authorizing Provider   aspirin (ADULT ASPIRIN EC LOW STRENGTH) 81 MG EC  tablet Take 1 tablet by mouth Daily. 6/11/12  Yes Historical Provider, MD   atorvastatin (LIPITOR) 10 MG tablet TAKE 1 TABLET (10 MG TOTAL) BY MOUTH ONCE DAILY. 10/4/18  Yes Emre Latif MD   calcium carbonate (OS-TRISH) 500 mg calcium (1,250 mg) tablet Two by mouth twice a day 6/11/18  Yes Emre Latif MD   doxazosin (CARDURA) 4 MG tablet TAKE 1.5 TABLETS (6 MG TOTAL) BY MOUTH 2 (TWO) TIMES DAILY. 10/27/18  Yes Emre Latif MD   fexofenadine (ALLEGRA) 60 MG tablet Take 1 tablet by mouth Twice daily as needed. 6/11/12  Yes Historical Provider, MD   furosemide (LASIX) 40 MG tablet TAKE 0.5 TABLETS (20 MG TOTAL) BY MOUTH 2 (TWO) TIMES DAILY. 1/17/18  Yes Emre Latif MD   gabapentin (NEURONTIN) 300 MG capsule 1 po q hs x 3 days, then 1 po bid x 3 days, then 1 po tid.  Patient taking differently: daily as needed. 1 po q hs x 3 days, then 1 po bid x 3 days, then 1 po tid. 1/16/18  Yes Adilia Fernandez PA-C   metoprolol tartrate (LOPRESSOR) 50 MG tablet TAKE 1 TABLET (50 MG TOTAL) BY MOUTH 2 (TWO) TIMES DAILY. 11/27/18  Yes Emre Latif MD   multivitamin (THERAGRAN) per tablet Take 1 tablet by mouth Daily. 6/11/12  Yes Historical Provider, MD   mycophenolate (CELLCEPT) 250 mg Cap TAKE 2 CAPSULES BY MOUTH EVERY 12 HOURS 10/13/18  Yes Emre Latif MD   NIFEdipine (ADALAT CC) 60 MG TbSR TAKE 1 TABLET BY MOUTH TWICE DAILY. 1/25/19  Yes Emre Latif MD   paricalcitol (ZEMPLAR) 1 MCG capsule One po MWF 6/6/16  Yes Emre Latif MD   predniSONE (DELTASONE) 5 MG tablet TAKE 1 TABLET (5 MG TOTAL) BY MOUTH EVERY MORNING. 9/3/18  Yes Emre Latif MD   ranitidine (ZANTAC) 150 MG tablet Take 1 tablet (150 mg total) by mouth 2 (two) times daily. 6/11/18 6/11/19 Yes Emre Latif MD   spironolactone (ALDACTONE) 25 MG tablet TAKE 1 TABLET (25 MG TOTAL) BY MOUTH ONCE DAILY. 11/27/18  Yes Emre Latif MD   tacrolimus (PROGRAF) 1 MG Cap TAKE 4 CAPSULES BY  "MOUTH IN THE MORNING AND THEN TAKE 3 CAPSULES BY MOUTH IN THE EVENING 7/23/18  Yes Emre Latif MD   triamcinolone acetonide 0.1% (KENALOG) 0.1 % cream AAA bid to rash on ankles 6/26/18  Yes Lyric Jovel MD   vitamin D 1000 units Tab Take 370 mg by mouth 2 (two) times daily. 2 tablets BID   Yes Historical Provider, MD   warfarin (COUMADIN) 5 MG tablet Take 1 tablet (5 mg total) by mouth Daily. 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   Substance Use Topics    Alcohol use: No    Drug use: No        Review of Systems   Constitutional: Negative for chills, fatigue and fever.   Respiratory: Negative for cough, chest tightness and shortness of breath.    Cardiovascular: Negative for chest pain.   Gastrointestinal: Negative for abdominal pain and blood in stool.   Genitourinary: Negative for dysuria and frequency.         Health Maintenance:     Immunizations:   Influenza declined by pt, Risks and benefits were discussed with patient.  TDap is up to date 3/2018  Pneumovax 12/2016, Prevnar 13 2/2017, pvax repeat rec at 64  Shingrix rec once back in stock, avoid zostavax     Cancer Screening:  Colonoscopy: is up to date. 9/2014, polyps, hyperplastic and tubular adenoma, rec f/u in 5 yrs    Objective:   BP (!) 142/68   Pulse 68   Temp 98.3 °F (36.8 °C)   Ht 6' 1" (1.854 m)   Wt 102.6 kg (226 lb 3.1 oz)   SpO2 98%   BMI 29.84 kg/m²    "     General: AAO x3, no apparent distress  CV: RRR, no m/r/g  Pulm: Lungs CTAB, no crackles, no wheezes  Abd: s/NT/ND +BS  Extremities: no c/c/e    Labs:     Lab Results   Component Value Date    WBC 7.35 10/15/2018    HGB 12.8 (L) 10/15/2018    HCT 40.8 10/15/2018    MCV 84 10/15/2018     10/15/2018     Lab Results   Component Value Date    IRON 53 11/06/2014    TIBC 249 (L) 11/06/2014    FERRITIN 834 (H) 11/06/2014     CMP  Sodium   Date Value Ref Range Status   10/15/2018 141 136 - 145 mmol/L Final     Potassium   Date Value Ref Range Status   10/15/2018 3.8 3.5 - 5.1 mmol/L Final     Chloride   Date Value Ref Range Status   10/15/2018 106 95 - 110 mmol/L Final     CO2   Date Value Ref Range Status   10/15/2018 24 23 - 29 mmol/L Final     Glucose   Date Value Ref Range Status   10/15/2018 106 70 - 110 mg/dL Final     BUN, Bld   Date Value Ref Range Status   10/15/2018 21 8 - 23 mg/dL Final     Creatinine   Date Value Ref Range Status   10/15/2018 1.8 (H) 0.5 - 1.4 mg/dL Final     Calcium   Date Value Ref Range Status   10/15/2018 8.5 (L) 8.7 - 10.5 mg/dL Final     Total Protein   Date Value Ref Range Status   10/15/2018 7.3 6.0 - 8.4 g/dL Final     Albumin   Date Value Ref Range Status   10/15/2018 3.5 3.5 - 5.2 g/dL Final   10/15/2018 3.5 3.5 - 5.2 g/dL Final     Total Bilirubin   Date Value Ref Range Status   10/15/2018 0.3 0.1 - 1.0 mg/dL Final     Comment:     For infants and newborns, interpretation of results should be based  on gestational age, weight and in agreement with clinical  observations.  Premature Infant recommended reference ranges:  Up to 24 hours.............<8.0 mg/dL  Up to 48 hours............<12.0 mg/dL  3-5 days..................<15.0 mg/dL  6-29 days.................<15.0 mg/dL       Alkaline Phosphatase   Date Value Ref Range Status   10/15/2018 80 55 - 135 U/L Final     AST   Date Value Ref Range Status   10/15/2018 23 10 - 40 U/L Final     ALT   Date Value Ref Range Status    10/15/2018 19 10 - 44 U/L Final     Anion Gap   Date Value Ref Range Status   10/15/2018 11 8 - 16 mmol/L Final     eGFR if    Date Value Ref Range Status   10/15/2018 45.3 (A) >60 mL/min/1.73 m^2 Final     eGFR if non    Date Value Ref Range Status   10/15/2018 39.2 (A) >60 mL/min/1.73 m^2 Final     Comment:     Calculation used to obtain the estimated glomerular filtration  rate (eGFR) is the CKD-EPI equation.        Lab Results   Component Value Date    HGBA1C 5.7 (H) 10/15/2018     Lab Results   Component Value Date    LDLCALC 63.6 06/18/2018     Lab Results   Component Value Date    TSH 1.454 10/02/2017         Assessment/Plan     Ibrahima was seen today for chills, tremors and weight loss.    Diagnoses and all orders for this visit:    Weight loss  Night sweats  Sx x 3 mos  Review of most recent labs only significant for AoCD, will repeat all basic labs and also check CT chest based on smoking hx.  No pulm sx other than some baseline sob which is unchanged.  No GI sx.  No Neuro sx.  Recent blood cx from Cards neg.  Some uro sx, prostate slightly enlarged on exam, will check u/a and PSA  Advised close f/u c transplant based on immunocompromised state and risk for opportunistic infections.  Would also be due for f/u csc later this year based on hx polyps  Consider heme/onc eval if sx persist pending initial w/u.  -     CBC auto differential; Future  -     TSH; Future  -     HIV 1/2 Ag/Ab (4th Gen); Future  -     Hepatitis panel, acute; Future  -     Hemoglobin A1c; Future  -     Protein electrophoresis, serum; Future  -     Protein electrophoresis, timed urine; Future  -     BK virus, DNA, quantitative [facility]; Plasma; Future  -     URINALYSIS; Future  -     CT Chest Lung Screening Low Dose; Future    H/O kidney transplant  Immunodeficiency due to treatment with immunosuppressive medication  CKD (chronic kidney disease) stage 3, GFR 30-59 ml/min  Secondary renal  hyperparathyroidism  Cr at baseline on most recent labs in Oct  Cont f/u c Dr. Latif and Dr. Chairez  -     BK virus, DNA, quantitative [facility]; Plasma; Future    Mixed hyperlipidemia  Lab Results   Component Value Date    LDLCALC 63.6 06/18/2018     At goal on meds, can repeat labs at f/u    Hypertensive heart and renal disease with renal failure, stage 1 through stage 4 or unspecified chronic kidney disease, with heart failure  BP a little high today, would consider increase in Nifedipine at f/u if still high and HR will tolerate.        -     Comprehensive metabolic panel; Future  -     BK virus, DNA, quantitative [facility]; Plasma; Future    Screening for cancer  -     CT Chest Lung Screening Low Dose; Future    Screening PSA (prostate specific antigen)  -     PSA, Screening; Future    Prediabetes  Lab Results   Component Value Date    HGBA1C 5.7 (H) 10/15/2018     Will repeat now based on above wt loss.  Pt was counseled on the need to avoid intake of simple sugars and minimize carbohydrates.  Also recommended weight loss and daily exercise.    Tobacco use  Other emphysema  Sx stable  Patient counseled on the need to stop smoking.    Coronary artery disease involving native coronary artery of native heart without angina pectoris  Paroxysmal atrial fibrillation  HF c preserved EF  Followed by Dr. Blunt, no acute issues  On coumadin as per coumadin clinic for stroke prevention  Optimize bp control as above  ldl at goal.      HM as above  RTC in 2 mos for f/u wt loss.  Labs this Saturday.    Connie Magdaleno MD  Department of Internal Medicine - [facility]  01/29/2019

## 2019-02-02 ENCOUNTER — LAB VISIT (OUTPATIENT)
Dept: LAB | Facility: HOSPITAL | Age: 64
End: 2019-02-02
Attending: INTERNAL MEDICINE
Payer: COMMERCIAL

## 2019-02-02 DIAGNOSIS — I13.0 HYPERTENSIVE HEART AND RENAL DISEASE WITH RENAL FAILURE, STAGE 1 THROUGH STAGE 4 OR UNSPECIFIED CHRONIC KIDNEY DISEASE, WITH HEART FAILURE: ICD-10-CM

## 2019-02-02 DIAGNOSIS — Z12.5 SCREENING PSA (PROSTATE SPECIFIC ANTIGEN): ICD-10-CM

## 2019-02-02 DIAGNOSIS — Z79.899 IMMUNODEFICIENCY DUE TO TREATMENT WITH IMMUNOSUPPRESSIVE MEDICATION: ICD-10-CM

## 2019-02-02 DIAGNOSIS — R63.4 WEIGHT LOSS: ICD-10-CM

## 2019-02-02 DIAGNOSIS — D84.821 IMMUNODEFICIENCY DUE TO TREATMENT WITH IMMUNOSUPPRESSIVE MEDICATION: ICD-10-CM

## 2019-02-02 DIAGNOSIS — Z94.0 H/O KIDNEY TRANSPLANT: ICD-10-CM

## 2019-02-02 LAB
ALBUMIN SERPL BCP-MCNC: 3.5 G/DL
ALP SERPL-CCNC: 65 U/L
ALT SERPL W/O P-5'-P-CCNC: 18 U/L
ANION GAP SERPL CALC-SCNC: 10 MMOL/L
AST SERPL-CCNC: 23 U/L
BASOPHILS # BLD AUTO: 0.02 K/UL
BASOPHILS NFR BLD: 0.3 %
BILIRUB SERPL-MCNC: 0.4 MG/DL
BUN SERPL-MCNC: 18 MG/DL
CALCIUM SERPL-MCNC: 8.6 MG/DL
CHLORIDE SERPL-SCNC: 107 MMOL/L
CO2 SERPL-SCNC: 25 MMOL/L
COMPLEXED PSA SERPL-MCNC: 0.4 NG/ML
CREAT SERPL-MCNC: 1.9 MG/DL
DIFFERENTIAL METHOD: ABNORMAL
EOSINOPHIL # BLD AUTO: 0.3 K/UL
EOSINOPHIL NFR BLD: 4 %
ERYTHROCYTE [DISTWIDTH] IN BLOOD BY AUTOMATED COUNT: 14.9 %
EST. GFR  (AFRICAN AMERICAN): 42 ML/MIN/1.73 M^2
EST. GFR  (NON AFRICAN AMERICAN): 36 ML/MIN/1.73 M^2
ESTIMATED AVG GLUCOSE: 120 MG/DL
GLUCOSE SERPL-MCNC: 96 MG/DL
HBA1C MFR BLD HPLC: 5.8 %
HCT VFR BLD AUTO: 40.4 %
HGB BLD-MCNC: 12.3 G/DL
LYMPHOCYTES # BLD AUTO: 1.7 K/UL
LYMPHOCYTES NFR BLD: 25.9 %
MCH RBC QN AUTO: 24.8 PG
MCHC RBC AUTO-ENTMCNC: 30.4 G/DL
MCV RBC AUTO: 82 FL
MONOCYTES # BLD AUTO: 0.6 K/UL
MONOCYTES NFR BLD: 9.5 %
NEUTROPHILS # BLD AUTO: 3.9 K/UL
NEUTROPHILS NFR BLD: 59.8 %
PLATELET # BLD AUTO: 165 K/UL
PMV BLD AUTO: 13 FL
POTASSIUM SERPL-SCNC: 3.3 MMOL/L
PROT SERPL-MCNC: 6.6 G/DL
RBC # BLD AUTO: 4.96 M/UL
SODIUM SERPL-SCNC: 142 MMOL/L
TSH SERPL DL<=0.005 MIU/L-ACNC: 1.43 UIU/ML
WBC # BLD AUTO: 6.44 K/UL

## 2019-02-02 PROCEDURE — 80053 COMPREHEN METABOLIC PANEL: CPT

## 2019-02-02 PROCEDURE — 85025 COMPLETE CBC W/AUTO DIFF WBC: CPT

## 2019-02-02 PROCEDURE — 83036 HEMOGLOBIN GLYCOSYLATED A1C: CPT

## 2019-02-02 PROCEDURE — 80074 ACUTE HEPATITIS PANEL: CPT

## 2019-02-02 PROCEDURE — 84443 ASSAY THYROID STIM HORMONE: CPT

## 2019-02-02 PROCEDURE — 87799 DETECT AGENT NOS DNA QUANT: CPT

## 2019-02-02 PROCEDURE — 86703 HIV-1/HIV-2 1 RESULT ANTBDY: CPT

## 2019-02-02 PROCEDURE — 84153 ASSAY OF PSA TOTAL: CPT

## 2019-02-04 LAB
HAV IGM SERPL QL IA: NEGATIVE
HBV CORE IGM SERPL QL IA: NEGATIVE
HBV SURFACE AG SERPL QL IA: NEGATIVE
HCV AB SERPL QL IA: NEGATIVE
HIV 1+2 AB+HIV1 P24 AG SERPL QL IA: NEGATIVE

## 2019-02-05 ENCOUNTER — HOSPITAL ENCOUNTER (OUTPATIENT)
Dept: RADIOLOGY | Facility: HOSPITAL | Age: 64
Discharge: HOME | End: 2019-02-05
Attending: INTERNAL MEDICINE
Payer: COMMERCIAL

## 2019-02-05 ENCOUNTER — TELEPHONE (OUTPATIENT)
Dept: INTERNAL MEDICINE | Facility: CLINIC | Age: 64
End: 2019-02-05

## 2019-02-05 DIAGNOSIS — Z12.9 SCREENING FOR CANCER: ICD-10-CM

## 2019-02-05 DIAGNOSIS — R63.4 WEIGHT LOSS: ICD-10-CM

## 2019-02-05 DIAGNOSIS — E87.6 HYPOKALEMIA: Primary | ICD-10-CM

## 2019-02-05 PROCEDURE — G0297 CT CHEST LUNG SCREENING LOW DOSE: ICD-10-PCS | Mod: 26,,, | Performed by: RADIOLOGY

## 2019-02-05 PROCEDURE — G0297 LDCT FOR LUNG CA SCREEN: HCPCS | Mod: TC

## 2019-02-05 PROCEDURE — G0297 LDCT FOR LUNG CA SCREEN: HCPCS | Mod: 26,,, | Performed by: RADIOLOGY

## 2019-02-05 RX ORDER — POTASSIUM CHLORIDE 750 MG/1
10 TABLET, EXTENDED RELEASE ORAL DAILY
Qty: 90 TABLET | Refills: 3 | Status: SHIPPED | OUTPATIENT
Start: 2019-02-05 | End: 2019-02-26 | Stop reason: SDUPTHER

## 2019-02-06 LAB
BKV DNA SERPL NAA+PROBE-ACNC: <125 COPIES/ML
BKV DNA SERPL NAA+PROBE-LOG#: <2.1 LOG (10) COPIES/ML
BKV DNA SERPL QL NAA+PROBE: NOT DETECTED

## 2019-02-07 ENCOUNTER — TELEPHONE (OUTPATIENT)
Dept: TRANSPLANT | Facility: CLINIC | Age: 64
End: 2019-02-07

## 2019-02-07 NOTE — TELEPHONE ENCOUNTER
Spoke to pt's wife, she is requesting apt with Dr Chairez, pt is due for annual in march but she is requesting earlier apt. Scheduled 3/15/19 at 10:30, pt already scheduled for labs 3/11/19 per DR Latif.

## 2019-02-24 DIAGNOSIS — Z79.01 LONG TERM CURRENT USE OF ANTICOAGULANT THERAPY: ICD-10-CM

## 2019-02-24 DIAGNOSIS — I48.91 ATRIAL FIBRILLATION WITH RAPID VENTRICULAR RESPONSE: ICD-10-CM

## 2019-02-24 RX ORDER — FUROSEMIDE 40 MG/1
20 TABLET ORAL 2 TIMES DAILY
Qty: 180 TABLET | Refills: 0 | Status: SHIPPED | OUTPATIENT
Start: 2019-02-24 | End: 2019-02-26 | Stop reason: SDUPTHER

## 2019-02-24 RX ORDER — WARFARIN SODIUM 5 MG/1
TABLET ORAL
Qty: 30 TABLET | Refills: 2 | Status: CANCELLED | OUTPATIENT
Start: 2019-02-24

## 2019-02-24 RX ORDER — AMOXICILLIN AND CLAVULANATE POTASSIUM 250; 125 MG/1; MG/1
1 TABLET, FILM COATED ORAL EVERY 8 HOURS
Qty: 21 TABLET | Refills: 0 | OUTPATIENT
Start: 2019-02-24 | End: 2019-03-03

## 2019-02-25 RX ORDER — WARFARIN SODIUM 5 MG/1
5 TABLET ORAL DAILY
Qty: 30 TABLET | Refills: 5 | Status: SHIPPED | OUTPATIENT
Start: 2019-02-25 | End: 2019-07-08 | Stop reason: SDUPTHER

## 2019-02-26 DIAGNOSIS — L30.8 ECZEMA CRAQUELE: ICD-10-CM

## 2019-02-26 DIAGNOSIS — E87.6 HYPOKALEMIA: ICD-10-CM

## 2019-02-26 RX ORDER — FUROSEMIDE 40 MG/1
20 TABLET ORAL 2 TIMES DAILY
Qty: 180 TABLET | Refills: 1 | Status: ON HOLD | OUTPATIENT
Start: 2019-02-26 | End: 2019-06-17 | Stop reason: HOSPADM

## 2019-02-26 RX ORDER — PREDNISONE 5 MG/1
5 TABLET ORAL EVERY MORNING
Qty: 90 TABLET | Refills: 1 | Status: SHIPPED | OUTPATIENT
Start: 2019-02-26 | End: 2019-07-08 | Stop reason: SDUPTHER

## 2019-02-26 RX ORDER — AMOXICILLIN AND CLAVULANATE POTASSIUM 250; 125 MG/1; MG/1
1 TABLET, FILM COATED ORAL EVERY 8 HOURS
Qty: 21 TABLET | Refills: 0 | OUTPATIENT
Start: 2019-02-26 | End: 2019-03-05

## 2019-02-26 RX ORDER — METOPROLOL TARTRATE 50 MG/1
50 TABLET ORAL 2 TIMES DAILY
Qty: 180 TABLET | Refills: 3 | Status: SHIPPED | OUTPATIENT
Start: 2019-02-26 | End: 2019-07-01

## 2019-02-26 RX ORDER — SPIRONOLACTONE 25 MG/1
25 TABLET ORAL DAILY
Qty: 90 TABLET | Refills: 3 | Status: SHIPPED | OUTPATIENT
Start: 2019-02-26 | End: 2019-07-08 | Stop reason: SDUPTHER

## 2019-02-26 RX ORDER — ATORVASTATIN CALCIUM 10 MG/1
10 TABLET, FILM COATED ORAL DAILY
Qty: 90 TABLET | Refills: 3 | Status: SHIPPED | OUTPATIENT
Start: 2019-02-26 | End: 2019-07-08 | Stop reason: SDUPTHER

## 2019-02-26 RX ORDER — POTASSIUM CHLORIDE 750 MG/1
10 TABLET, EXTENDED RELEASE ORAL DAILY
Qty: 90 TABLET | Refills: 3 | Status: SHIPPED | OUTPATIENT
Start: 2019-02-26 | End: 2019-07-06 | Stop reason: SDUPTHER

## 2019-02-26 RX ORDER — CALCIUM CARBONATE 500(1250)
TABLET ORAL
Qty: 120 TABLET | Refills: 11 | Status: SHIPPED | OUTPATIENT
Start: 2019-02-26 | End: 2020-01-20 | Stop reason: SDUPTHER

## 2019-02-26 RX ORDER — TRIAMCINOLONE ACETONIDE 1 MG/G
CREAM TOPICAL
Qty: 60 G | Refills: 3 | Status: SHIPPED | OUTPATIENT
Start: 2019-02-26 | End: 2020-10-19

## 2019-02-26 RX ORDER — DOXAZOSIN 4 MG/1
4 TABLET ORAL EVERY 12 HOURS
Qty: 270 TABLET | Refills: 3 | Status: SHIPPED | OUTPATIENT
Start: 2019-02-26 | End: 2019-07-08 | Stop reason: SDUPTHER

## 2019-02-26 RX ORDER — NIFEDIPINE 60 MG/1
60 TABLET, EXTENDED RELEASE ORAL 2 TIMES DAILY
Qty: 180 TABLET | Refills: 3 | Status: ON HOLD | OUTPATIENT
Start: 2019-02-26 | End: 2019-06-17 | Stop reason: HOSPADM

## 2019-02-27 ENCOUNTER — TELEPHONE (OUTPATIENT)
Dept: TRANSPLANT | Facility: CLINIC | Age: 64
End: 2019-02-27

## 2019-03-04 RX ORDER — PARICALCITOL 1 UG/1
CAPSULE, LIQUID FILLED ORAL
Qty: 90 CAPSULE | Refills: 3
Start: 2019-03-04 | End: 2019-03-11 | Stop reason: SDUPTHER

## 2019-03-11 ENCOUNTER — OFFICE VISIT (OUTPATIENT)
Dept: NEPHROLOGY | Facility: CLINIC | Age: 64
End: 2019-03-11
Payer: COMMERCIAL

## 2019-03-11 ENCOUNTER — LAB VISIT (OUTPATIENT)
Dept: LAB | Facility: HOSPITAL | Age: 64
End: 2019-03-11
Attending: INTERNAL MEDICINE
Payer: COMMERCIAL

## 2019-03-11 VITALS
OXYGEN SATURATION: 98 % | HEIGHT: 73 IN | HEART RATE: 58 BPM | BODY MASS INDEX: 29.98 KG/M2 | WEIGHT: 226.19 LBS | DIASTOLIC BLOOD PRESSURE: 58 MMHG | SYSTOLIC BLOOD PRESSURE: 130 MMHG | RESPIRATION RATE: 18 BRPM

## 2019-03-11 DIAGNOSIS — Z79.01 LONG TERM (CURRENT) USE OF ANTICOAGULANTS: ICD-10-CM

## 2019-03-11 DIAGNOSIS — I13.0 HYPERTENSIVE HEART AND RENAL DISEASE WITH RENAL FAILURE, STAGE 1 THROUGH STAGE 4 OR UNSPECIFIED CHRONIC KIDNEY DISEASE, WITH HEART FAILURE: ICD-10-CM

## 2019-03-11 DIAGNOSIS — N18.30 CKD (CHRONIC KIDNEY DISEASE) STAGE 3, GFR 30-59 ML/MIN: ICD-10-CM

## 2019-03-11 DIAGNOSIS — Z72.0 TOBACCO USE: ICD-10-CM

## 2019-03-11 DIAGNOSIS — Z86.79 HISTORY OF PERICARDITIS: ICD-10-CM

## 2019-03-11 DIAGNOSIS — Z94.0 KIDNEY TRANSPLANT RECIPIENT: ICD-10-CM

## 2019-03-11 DIAGNOSIS — D50.8 OTHER IRON DEFICIENCY ANEMIA: ICD-10-CM

## 2019-03-11 DIAGNOSIS — N25.81 SECONDARY RENAL HYPERPARATHYROIDISM: ICD-10-CM

## 2019-03-11 DIAGNOSIS — I25.10 CORONARY ARTERY DISEASE INVOLVING NATIVE CORONARY ARTERY OF NATIVE HEART WITHOUT ANGINA PECTORIS: ICD-10-CM

## 2019-03-11 DIAGNOSIS — R73.03 PREDIABETES: ICD-10-CM

## 2019-03-11 DIAGNOSIS — E78.2 MIXED HYPERLIPIDEMIA: ICD-10-CM

## 2019-03-11 DIAGNOSIS — E55.9 VITAMIN D DEFICIENCY: ICD-10-CM

## 2019-03-11 DIAGNOSIS — I50.30 (HFPEF) HEART FAILURE WITH PRESERVED EJECTION FRACTION: ICD-10-CM

## 2019-03-11 DIAGNOSIS — M89.8X9 METABOLIC BONE DISEASE: ICD-10-CM

## 2019-03-11 DIAGNOSIS — Z94.0 H/O KIDNEY TRANSPLANT: ICD-10-CM

## 2019-03-11 DIAGNOSIS — Z94.0 KIDNEY TRANSPLANT RECIPIENT: Primary | ICD-10-CM

## 2019-03-11 DIAGNOSIS — I48.0 PAROXYSMAL ATRIAL FIBRILLATION: ICD-10-CM

## 2019-03-11 PROBLEM — J44.9 CHRONIC OBSTRUCTIVE PULMONARY DISEASE: Status: RESOLVED | Noted: 2017-02-22 | Resolved: 2019-03-11

## 2019-03-11 LAB
25(OH)D3+25(OH)D2 SERPL-MCNC: 71 NG/ML
ALBUMIN SERPL BCP-MCNC: 3.4 G/DL
ALBUMIN SERPL BCP-MCNC: 3.4 G/DL
ALP SERPL-CCNC: 90 U/L
ALT SERPL W/O P-5'-P-CCNC: 16 U/L
ANION GAP SERPL CALC-SCNC: 9 MMOL/L
AST SERPL-CCNC: 22 U/L
BASOPHILS # BLD AUTO: 0.03 K/UL
BASOPHILS NFR BLD: 0.4 %
BILIRUB DIRECT SERPL-MCNC: 0.1 MG/DL
BILIRUB SERPL-MCNC: 0.3 MG/DL
BUN SERPL-MCNC: 21 MG/DL
CALCIUM SERPL-MCNC: 8.5 MG/DL
CHLORIDE SERPL-SCNC: 107 MMOL/L
CO2 SERPL-SCNC: 26 MMOL/L
CREAT SERPL-MCNC: 1.8 MG/DL
DIFFERENTIAL METHOD: ABNORMAL
EOSINOPHIL # BLD AUTO: 0.3 K/UL
EOSINOPHIL NFR BLD: 3.9 %
ERYTHROCYTE [DISTWIDTH] IN BLOOD BY AUTOMATED COUNT: 14.7 %
EST. GFR  (AFRICAN AMERICAN): 45 ML/MIN/1.73 M^2
EST. GFR  (NON AFRICAN AMERICAN): 38.9 ML/MIN/1.73 M^2
FERRITIN SERPL-MCNC: 822 NG/ML
GLUCOSE SERPL-MCNC: 100 MG/DL
HCT VFR BLD AUTO: 41.7 %
HGB BLD-MCNC: 12.8 G/DL
IMM GRANULOCYTES # BLD AUTO: 0.04 K/UL
IMM GRANULOCYTES NFR BLD AUTO: 0.5 %
INR PPP: 2.3
IRON SERPL-MCNC: 66 UG/DL
LYMPHOCYTES # BLD AUTO: 2.2 K/UL
LYMPHOCYTES NFR BLD: 29.4 %
MCH RBC QN AUTO: 25.5 PG
MCHC RBC AUTO-ENTMCNC: 30.7 G/DL
MCV RBC AUTO: 83 FL
MONOCYTES # BLD AUTO: 0.8 K/UL
MONOCYTES NFR BLD: 10 %
NEUTROPHILS # BLD AUTO: 4.2 K/UL
NEUTROPHILS NFR BLD: 55.8 %
NRBC BLD-RTO: 0 /100 WBC
PHOSPHATE SERPL-MCNC: 2.9 MG/DL
PLATELET # BLD AUTO: 159 K/UL
PMV BLD AUTO: 12.6 FL
POTASSIUM SERPL-SCNC: 4.1 MMOL/L
PROT SERPL-MCNC: 6.9 G/DL
PROTHROMBIN TIME: 22.4 SEC
PTH-INTACT SERPL-MCNC: 43 PG/ML
RBC # BLD AUTO: 5.02 M/UL
SATURATED IRON: 26 %
SODIUM SERPL-SCNC: 142 MMOL/L
TACROLIMUS BLD-MCNC: 7.6 NG/ML
TOTAL IRON BINDING CAPACITY: 250 UG/DL
TRANSFERRIN SERPL-MCNC: 169 MG/DL
WBC # BLD AUTO: 7.49 K/UL

## 2019-03-11 PROCEDURE — 82728 ASSAY OF FERRITIN: CPT

## 2019-03-11 PROCEDURE — 36415 COLL VENOUS BLD VENIPUNCTURE: CPT

## 2019-03-11 PROCEDURE — 3008F BODY MASS INDEX DOCD: CPT | Mod: CPTII,S$GLB,, | Performed by: INTERNAL MEDICINE

## 2019-03-11 PROCEDURE — 99999 PR PBB SHADOW E&M-EST. PATIENT-LVL IV: CPT | Mod: PBBFAC,,, | Performed by: INTERNAL MEDICINE

## 2019-03-11 PROCEDURE — 80197 ASSAY OF TACROLIMUS: CPT

## 2019-03-11 PROCEDURE — 82306 VITAMIN D 25 HYDROXY: CPT

## 2019-03-11 PROCEDURE — 3075F PR MOST RECENT SYSTOLIC BLOOD PRESS GE 130-139MM HG: ICD-10-PCS | Mod: CPTII,S$GLB,, | Performed by: INTERNAL MEDICINE

## 2019-03-11 PROCEDURE — 99214 OFFICE O/P EST MOD 30 MIN: CPT | Mod: S$GLB,,, | Performed by: INTERNAL MEDICINE

## 2019-03-11 PROCEDURE — 82247 BILIRUBIN TOTAL: CPT

## 2019-03-11 PROCEDURE — 3078F PR MOST RECENT DIASTOLIC BLOOD PRESSURE < 80 MM HG: ICD-10-PCS | Mod: CPTII,S$GLB,, | Performed by: INTERNAL MEDICINE

## 2019-03-11 PROCEDURE — 84075 ASSAY ALKALINE PHOSPHATASE: CPT

## 2019-03-11 PROCEDURE — 99214 PR OFFICE/OUTPT VISIT, EST, LEVL IV, 30-39 MIN: ICD-10-PCS | Mod: S$GLB,,, | Performed by: INTERNAL MEDICINE

## 2019-03-11 PROCEDURE — 85610 PROTHROMBIN TIME: CPT

## 2019-03-11 PROCEDURE — 85025 COMPLETE CBC W/AUTO DIFF WBC: CPT

## 2019-03-11 PROCEDURE — 83970 ASSAY OF PARATHORMONE: CPT

## 2019-03-11 PROCEDURE — 3078F DIAST BP <80 MM HG: CPT | Mod: CPTII,S$GLB,, | Performed by: INTERNAL MEDICINE

## 2019-03-11 PROCEDURE — 99999 PR PBB SHADOW E&M-EST. PATIENT-LVL IV: ICD-10-PCS | Mod: PBBFAC,,, | Performed by: INTERNAL MEDICINE

## 2019-03-11 PROCEDURE — 3008F PR BODY MASS INDEX (BMI) DOCUMENTED: ICD-10-PCS | Mod: CPTII,S$GLB,, | Performed by: INTERNAL MEDICINE

## 2019-03-11 PROCEDURE — 83540 ASSAY OF IRON: CPT

## 2019-03-11 PROCEDURE — 3075F SYST BP GE 130 - 139MM HG: CPT | Mod: CPTII,S$GLB,, | Performed by: INTERNAL MEDICINE

## 2019-03-11 PROCEDURE — 80069 RENAL FUNCTION PANEL: CPT

## 2019-03-11 RX ORDER — PARICALCITOL 1 UG/1
CAPSULE, LIQUID FILLED ORAL
Qty: 90 CAPSULE | Refills: 3 | Status: SHIPPED | OUTPATIENT
Start: 2019-03-11 | End: 2019-07-08 | Stop reason: SDUPTHER

## 2019-03-11 RX ORDER — IBUPROFEN 200 MG
1 TABLET ORAL DAILY
Refills: 4 | COMMUNITY
Start: 2019-02-26 | End: 2019-05-06 | Stop reason: SDUPTHER

## 2019-03-18 ENCOUNTER — ANTI-COAG VISIT (OUTPATIENT)
Dept: CARDIOLOGY | Facility: CLINIC | Age: 64
End: 2019-03-18

## 2019-03-18 DIAGNOSIS — Z79.01 LONG TERM (CURRENT) USE OF ANTICOAGULANTS: ICD-10-CM

## 2019-03-21 NOTE — PROGRESS NOTES
Subjective:       Patient ID: Ibrahima Phelps is a 63 y.o. Black or  male who presents for follow-up evaluation of Chronic Kidney Disease and Kidney Transplant    HPI     He is getting over an URI, has not 'gone to his chest' but now feeling better. He continues to smoke a few cigarettes a day. Off PPI for several months now and without dyspepsia. No LE edema and no SOB.  He's noticed some allograft 'twinges' but no overt pain but no problems with urination. His wife is still working at [employer] as a manager of housing for residents.     Interval history March 2019: he has three concerns:  1. Worsening tremors. Spilling corn and trouble with writing  2. Increased nocturia  3. Cold intolerance    He denies recent illness. No allogarft pain and no LUTS other than nocturia. He has unintentionally lost weight. No new medications. He is doing well off PPI. He is smoking 3 cigarettes to 3/4ppd depending on the day.     Review of Systems   Constitutional: Positive for unexpected weight change. Negative for activity change, appetite change, fatigue and fever.   HENT: Negative for congestion, facial swelling, postnasal drip and rhinorrhea.    Respiratory: Negative for cough and shortness of breath.    Cardiovascular: Negative for chest pain and leg swelling.   Gastrointestinal: Negative for abdominal pain (allograft 'twinges').   Endocrine: Positive for cold intolerance.   Genitourinary: Positive for frequency. Negative for decreased urine volume, difficulty urinating, dysuria and hematuria.   Musculoskeletal: Positive for arthralgias and back pain.   Skin: Negative for rash.   Neurological: Positive for tremors. Negative for weakness and headaches.   Hematological: Does not bruise/bleed easily.   Psychiatric/Behavioral: Negative for decreased concentration and sleep disturbance.       Objective:      Physical Exam   Constitutional: He is oriented to person, place, and time. He appears well-developed and  well-nourished. No distress.   HENT:   Mouth/Throat: Oropharynx is clear and moist.   Eyes: No scleral icterus.   Neck: No JVD present.   Cardiovascular: Normal rate, S1 normal and S2 normal. Exam reveals no friction rub.   Pulmonary/Chest: Effort normal. He has no wheezes. He has no rales.   Abdominal: Soft. There is no tenderness.   Musculoskeletal: He exhibits no edema.   Neurological: He is alert and oriented to person, place, and time.   Skin: Skin is warm and dry.   Psychiatric: He has a normal mood and affect.   Nursing note and vitals reviewed.      Assessment:       1. Kidney transplant recipient    2. CKD (chronic kidney disease) stage 3, GFR 30-59 ml/min    3. H/O kidney transplant    4. Hypertensive heart and renal disease with renal failure, stage 1 through stage 4 or unspecified chronic kidney disease, with heart failure    5. Paroxysmal atrial fibrillation    6. Mixed hyperlipidemia    7. History of pericarditis    8. Coronary artery disease involving native coronary artery of native heart without angina pectoris    9. (HFpEF) heart failure with preserved ejection fraction    10. Secondary renal hyperparathyroidism    11. Prediabetes    12. Tobacco use    13. Metabolic bone disease        Plan:             ESRD s/p kidney transplsant with resultant CKD. Stable kidney function.     HTN is controlled    PreDiabetes--stable    Mineral and Bone Disease--continue current treatment with calcium tablets, Zemplar and D3    Back pain--no NSAIDs, OK for gabapentin    Tobacco use--counseled, again    Tremors--discussed changing to cyclosporine or rapamune. At this point pt prefers to continue with Prograf. I did discuss his trough does tend to run on higher side--will check with kidney transplant for clarification of his specific goal      RTC 4 months with labs prior

## 2019-04-02 ENCOUNTER — OFFICE VISIT (OUTPATIENT)
Dept: INTERNAL MEDICINE | Facility: CLINIC | Age: 64
End: 2019-04-02
Payer: COMMERCIAL

## 2019-04-02 VITALS
HEART RATE: 61 BPM | WEIGHT: 226.88 LBS | SYSTOLIC BLOOD PRESSURE: 150 MMHG | BODY MASS INDEX: 30.07 KG/M2 | DIASTOLIC BLOOD PRESSURE: 72 MMHG | OXYGEN SATURATION: 98 % | HEIGHT: 73 IN

## 2019-04-02 DIAGNOSIS — I25.10 CORONARY ARTERY DISEASE INVOLVING NATIVE CORONARY ARTERY OF NATIVE HEART WITHOUT ANGINA PECTORIS: ICD-10-CM

## 2019-04-02 DIAGNOSIS — I48.0 PAROXYSMAL ATRIAL FIBRILLATION: ICD-10-CM

## 2019-04-02 DIAGNOSIS — N18.30 CKD (CHRONIC KIDNEY DISEASE) STAGE 3, GFR 30-59 ML/MIN: ICD-10-CM

## 2019-04-02 DIAGNOSIS — R73.03 PREDIABETES: ICD-10-CM

## 2019-04-02 DIAGNOSIS — N25.81 SECONDARY RENAL HYPERPARATHYROIDISM: ICD-10-CM

## 2019-04-02 DIAGNOSIS — R61 NIGHT SWEATS: Primary | ICD-10-CM

## 2019-04-02 DIAGNOSIS — D84.821 IMMUNODEFICIENCY DUE TO TREATMENT WITH IMMUNOSUPPRESSIVE MEDICATION: ICD-10-CM

## 2019-04-02 DIAGNOSIS — Z94.0 H/O KIDNEY TRANSPLANT: ICD-10-CM

## 2019-04-02 DIAGNOSIS — Z79.01 LONG TERM (CURRENT) USE OF ANTICOAGULANTS: ICD-10-CM

## 2019-04-02 DIAGNOSIS — R63.4 WEIGHT LOSS: ICD-10-CM

## 2019-04-02 DIAGNOSIS — Z72.0 TOBACCO USE: ICD-10-CM

## 2019-04-02 DIAGNOSIS — Z79.899 IMMUNODEFICIENCY DUE TO TREATMENT WITH IMMUNOSUPPRESSIVE MEDICATION: ICD-10-CM

## 2019-04-02 DIAGNOSIS — I13.0 HYPERTENSIVE HEART AND RENAL DISEASE WITH RENAL FAILURE, STAGE 1 THROUGH STAGE 4 OR UNSPECIFIED CHRONIC KIDNEY DISEASE, WITH HEART FAILURE: ICD-10-CM

## 2019-04-02 PROCEDURE — 3008F PR BODY MASS INDEX (BMI) DOCUMENTED: ICD-10-PCS | Mod: CPTII,S$GLB,, | Performed by: INTERNAL MEDICINE

## 2019-04-02 PROCEDURE — 3077F PR MOST RECENT SYSTOLIC BLOOD PRESSURE >= 140 MM HG: ICD-10-PCS | Mod: CPTII,S$GLB,, | Performed by: INTERNAL MEDICINE

## 2019-04-02 PROCEDURE — 99214 PR OFFICE/OUTPT VISIT, EST, LEVL IV, 30-39 MIN: ICD-10-PCS | Mod: S$GLB,,, | Performed by: INTERNAL MEDICINE

## 2019-04-02 PROCEDURE — 99999 PR PBB SHADOW E&M-EST. PATIENT-LVL III: ICD-10-PCS | Mod: PBBFAC,,, | Performed by: INTERNAL MEDICINE

## 2019-04-02 PROCEDURE — 3008F BODY MASS INDEX DOCD: CPT | Mod: CPTII,S$GLB,, | Performed by: INTERNAL MEDICINE

## 2019-04-02 PROCEDURE — 3077F SYST BP >= 140 MM HG: CPT | Mod: CPTII,S$GLB,, | Performed by: INTERNAL MEDICINE

## 2019-04-02 PROCEDURE — 99214 OFFICE O/P EST MOD 30 MIN: CPT | Mod: S$GLB,,, | Performed by: INTERNAL MEDICINE

## 2019-04-02 PROCEDURE — 3078F PR MOST RECENT DIASTOLIC BLOOD PRESSURE < 80 MM HG: ICD-10-PCS | Mod: CPTII,S$GLB,, | Performed by: INTERNAL MEDICINE

## 2019-04-02 PROCEDURE — 99999 PR PBB SHADOW E&M-EST. PATIENT-LVL III: CPT | Mod: PBBFAC,,, | Performed by: INTERNAL MEDICINE

## 2019-04-02 PROCEDURE — 3078F DIAST BP <80 MM HG: CPT | Mod: CPTII,S$GLB,, | Performed by: INTERNAL MEDICINE

## 2019-04-02 NOTE — PROGRESS NOTES
INTERNAL MEDICINE ESTABLISHED PATIENT VISIT NOTE    Subjective:     Chief Complaint: Follow-up  HTN, wt loss     Patient ID: Ibrahima Phelps is a 63 y.o. male with  HTN c CKD3 and hx renal transplant x2 in 2002 and 2005 and on immunosuppressive meds and secondary hyperPTH, HLD, A fib, CAD c chronic HF c preserved EF, COPD c baseline DAUGHERTY and tob use, thrombocytopenia now resolved, anemia of chronic disease, preDM, GERD, last seen by me in Jan, here today for f/u.    Reports still c occasional night sweats.  No fever.  Sometimes c chills.  Weight down about 2 lbs since last seen in Jan but down about 10-12 lbs since last year.  Admits he often skips meals and describes appetite as average.  To review, had extensive w/u at last appt for evaluation of night sweats and wt loss:  Anemia of chronic disease stable.  preDM close to baseline  Normal TSH, neg HIV and hep panel  Neg blood cultures  PSA wnl  CT chest (hx tob use) unremarkable.  CKD stable.  SPEP and UPEP ordered but never completed.  Csc due later this year and asymptomatic from GI standpoint.    Denies cp or sob.  No cough or hemoptysis.  No h/a or vision changes.  Did not take BP meds yet today.  Still smoking and states he is not yet ready to quit.    Past Medical History:  Past Medical History:   Diagnosis Date    (HFpEF) heart failure with preserved ejection fraction 9/25/2017    Atrial fibrillation     CAD (coronary artery disease)     Diverticulosis     Fever blister     Heart attack 2006    Hyperlipidemia     Hypertension     Immunodeficiency due to treatment with immunosuppressive medication     Keloid cicatrix     Metabolic bone disease     Pericarditis     S/P kidney transplant     Thrombocytopenia     Thyroid disease     Unspecified disorder of kidney and ureter     Stage IIICKD       Home Medications:  Prior to Admission medications    Medication Sig Start Date End Date Taking? Authorizing Provider   aspirin (ADULT ASPIRIN EC LOW  STRENGTH) 81 MG EC tablet Take 1 tablet by mouth Daily. 6/11/12  Yes Historical Provider, MD   atorvastatin (LIPITOR) 10 MG tablet Take 1 tablet (10 mg total) by mouth once daily. 2/26/19  Yes Emre Latif MD   calcium carbonate (OS-TRISH) 500 mg calcium (1,250 mg) tablet Two by mouth twice a day 2/26/19  Yes Emre Latif MD   doxazosin (CARDURA) 4 MG tablet Take 1 tablet (4 mg total) by mouth every 12 (twelve) hours. 2/26/19  Yes Emre Latif MD   fexofenadine (ALLEGRA) 60 MG tablet Take 1 tablet by mouth Twice daily as needed. 6/11/12  Yes Historical Provider, MD   furosemide (LASIX) 40 MG tablet Take 0.5 tablets (20 mg total) by mouth 2 (two) times daily. 2/26/19  Yes Emre Latif MD   gabapentin (NEURONTIN) 300 MG capsule 1 po q hs x 3 days, then 1 po bid x 3 days, then 1 po tid.  Patient taking differently: daily as needed. 1 po q hs x 3 days, then 1 po bid x 3 days, then 1 po tid. 1/16/18  Yes Adilia Fernandez PA-C   metoprolol tartrate (LOPRESSOR) 50 MG tablet Take 1 tablet (50 mg total) by mouth 2 (two) times daily. 2/26/19  Yes Emre Latif MD   multivitamin (THERAGRAN) per tablet Take 1 tablet by mouth Daily. 6/11/12  Yes Historical Provider, MD   mycophenolate (CELLCEPT) 250 mg Cap TAKE 2 CAPSULES BY MOUTH EVERY 12 HOURS 10/13/18  Yes Emre Latif MD   nicotine (NICODERM CQ) 21 mg/24 hr Place 1 patch onto the skin once daily. 2/26/19  Yes Historical Provider, MD   NIFEdipine (ADALAT CC) 60 MG TbSR Take 1 tablet (60 mg total) by mouth 2 (two) times daily. 2/26/19  Yes Emre Latif MD   paricalcitol (ZEMPLAR) 1 MCG capsule One po MWF 3/11/19  Yes Emre Latif MD   potassium chloride (KLOR-CON) 10 MEQ TbSR Take 1 tablet (10 mEq total) by mouth once daily. 2/26/19  Yes Connie Magdaleno MD   predniSONE (DELTASONE) 5 MG tablet Take 1 tablet (5 mg total) by mouth every morning. 2/26/19  Yes Emre Latif MD   ranitidine (ZANTAC) 150 MG tablet Take 1  tablet (150 mg total) by mouth 2 (two) times daily. 6/11/18 6/11/19 Yes Emre Latif MD   spironolactone (ALDACTONE) 25 MG tablet Take 1 tablet (25 mg total) by mouth once daily. 2/26/19  Yes Emre Latif MD   tacrolimus (PROGRAF) 1 MG Cap TAKE 4 CAPSULES BY MOUTH IN THE MORNING AND THEN TAKE 3 CAPSULES BY MOUTH IN THE EVENING 7/23/18  Yes Emre Latif MD   triamcinolone acetonide 0.1% (KENALOG) 0.1 % cream AAA bid to rash on ankles 2/26/19  Yes Lyric Jovel MD   vitamin D 1000 units Tab Take 370 mg by mouth 2 (two) times daily. 2 tablets BID   Yes Historical MD Magnus   warfarin (COUMADIN) 5 MG tablet Take 1 tablet (5 mg total) by mouth Daily. 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   Substance Use Topics    Alcohol use: No    Drug use: No        Review of Systems   Constitutional: Positive for chills and unexpected weight change. Negative for fatigue and fever.   Respiratory: Negative for cough, chest tightness and shortness of breath.    Cardiovascular: Negative for chest pain.   Gastrointestinal: Negative for abdominal pain and blood in stool.   Genitourinary: Negative for dysuria and frequency.         Health Maintenance:     Immunizations:   Influenza declined by pt, Risks and benefits were discussed with patient.  TDap is up to date 3/2018  Pneumovax 12/2016, Prevnar 13  "2/2017, pvax repeat rec at 64  Shingrix rec once back in stock, avoid zostavax     Cancer Screening:  Colonoscopy: is up to date. 9/2014, polyps, hyperplastic and tubular adenoma, rec f/u in 5 yrs, will schedule at f/u    Objective:   BP (!) 150/72   Pulse 61   Ht 6' 1" (1.854 m)   Wt 102.9 kg (226 lb 13.7 oz)   SpO2 98%   BMI 29.93 kg/m²        General: AAO x3, no apparent distress  HEENT: PERRL, OP clear, no cervical LAD appreciated.  CV: irregularly irregular, no m/r/g  Pulm: Lungs CTAB, no crackles, no wheezes  Abd: s/NT/ND +BS  Extremities: no c/c/e    Labs:     Lab Results   Component Value Date    LDLCALC 63.6 06/18/2018     Lab Results   Component Value Date    WBC 7.49 03/11/2019    HGB 12.8 (L) 03/11/2019    HCT 41.7 03/11/2019    MCV 83 03/11/2019     03/11/2019     Lab Results   Component Value Date    IRON 66 03/11/2019    TIBC 250 03/11/2019    FERRITIN 822 (H) 03/11/2019     CMP  Sodium   Date Value Ref Range Status   03/11/2019 142 136 - 145 mmol/L Final     Potassium   Date Value Ref Range Status   03/11/2019 4.1 3.5 - 5.1 mmol/L Final     Chloride   Date Value Ref Range Status   03/11/2019 107 95 - 110 mmol/L Final     CO2   Date Value Ref Range Status   03/11/2019 26 23 - 29 mmol/L Final     Glucose   Date Value Ref Range Status   03/11/2019 100 70 - 110 mg/dL Final     BUN, Bld   Date Value Ref Range Status   03/11/2019 21 8 - 23 mg/dL Final     Creatinine   Date Value Ref Range Status   03/11/2019 1.8 (H) 0.5 - 1.4 mg/dL Final     Calcium   Date Value Ref Range Status   03/11/2019 8.5 (L) 8.7 - 10.5 mg/dL Final     Total Protein   Date Value Ref Range Status   03/11/2019 6.9 6.0 - 8.4 g/dL Final     Albumin   Date Value Ref Range Status   03/11/2019 3.4 (L) 3.5 - 5.2 g/dL Final   03/11/2019 3.4 (L) 3.5 - 5.2 g/dL Final     Total Bilirubin   Date Value Ref Range Status   03/11/2019 0.3 0.1 - 1.0 mg/dL Final     Comment:     For infants and newborns, interpretation of results should be " based  on gestational age, weight and in agreement with clinical  observations.  Premature Infant recommended reference ranges:  Up to 24 hours.............<8.0 mg/dL  Up to 48 hours............<12.0 mg/dL  3-5 days..................<15.0 mg/dL  6-29 days.................<15.0 mg/dL       Alkaline Phosphatase   Date Value Ref Range Status   03/11/2019 90 55 - 135 U/L Final     AST   Date Value Ref Range Status   03/11/2019 22 10 - 40 U/L Final     ALT   Date Value Ref Range Status   03/11/2019 16 10 - 44 U/L Final     Anion Gap   Date Value Ref Range Status   03/11/2019 9 8 - 16 mmol/L Final     eGFR if    Date Value Ref Range Status   03/11/2019 45.0 (A) >60 mL/min/1.73 m^2 Final     eGFR if non    Date Value Ref Range Status   03/11/2019 38.9 (A) >60 mL/min/1.73 m^2 Final     Comment:     Calculation used to obtain the estimated glomerular filtration  rate (eGFR) is the CKD-EPI equation.        Lab Results   Component Value Date    HGBA1C 5.8 (H) 02/02/2019     Lab Results   Component Value Date    TSH 1.434 02/02/2019         Assessment/Plan     Ibrahima was seen today for follow-up.    Diagnoses and all orders for this visit:    Night sweats  Weight loss  As per HPI  W/u neg thus far, need spep/upep done as previously ordered  Consider csc earlier than Sept as part of w/u of wt loss, pt declined at this time and would like to wait until later this year,Risks and benefits were discussed with patient..  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  Advised to cont meds as rx'ed and before all  appts.  Cr stable on last check.  Cont f/u c Nephrology    Coronary artery disease involving native coronary artery of native heart without angina pectoris  Asymptomatic, no issues, BP control as above.  Lab Results   Component Value Date    LDLCALC 63.6 06/18/2018     Chol at goal on lipitor.   -     Lipid panel; Future    Paroxysmal atrial fibrillation  Long term (current) use of anticoagulants [V58.61]  Rate controlled on current regimen.  INR therapeutic on last check, cont f/u c cards and coumadin clinic    Tobacco use  Patient counseled on the need to stop smoking.  Avoiding well      Immunodeficiency due to treatment with immunosuppressive medication  H/o Kidney transplant  No acute issues, cont f/u c transplant team, particularly regarding additional w/u if needed for wt loss and night sweats.  Will have spep and upep scheduled for labs as previously ordered.      HM as above  RTC in 3 mos for f/u, sooner if needed for above sx.    Connie Magdaleno MD  Department of Internal Medicine - [facility]  04/02/2019

## 2019-04-02 NOTE — Clinical Note
I ordered a serum and urine protein electrophoresis back in Jan that never got done.  Can we see if this can be set up with his upcoming lab appt and the urine is a 24 hr urine I believe.

## 2019-04-12 ENCOUNTER — OFFICE VISIT (OUTPATIENT)
Dept: TRANSPLANT | Facility: CLINIC | Age: 64
End: 2019-04-12
Payer: COMMERCIAL

## 2019-04-12 ENCOUNTER — LAB VISIT (OUTPATIENT)
Dept: LAB | Facility: HOSPITAL | Age: 64
End: 2019-04-12
Attending: INTERNAL MEDICINE
Payer: COMMERCIAL

## 2019-04-12 ENCOUNTER — ANTI-COAG VISIT (OUTPATIENT)
Dept: CARDIOLOGY | Facility: CLINIC | Age: 64
End: 2019-04-12
Payer: COMMERCIAL

## 2019-04-12 VITALS
TEMPERATURE: 98 F | RESPIRATION RATE: 20 BRPM | SYSTOLIC BLOOD PRESSURE: 149 MMHG | HEART RATE: 56 BPM | WEIGHT: 225.31 LBS | OXYGEN SATURATION: 99 % | DIASTOLIC BLOOD PRESSURE: 83 MMHG | HEIGHT: 72 IN | BODY MASS INDEX: 30.52 KG/M2

## 2019-04-12 DIAGNOSIS — Z79.899 IMMUNOSUPPRESSIVE MANAGEMENT ENCOUNTER FOLLOWING KIDNEY TRANSPLANT: ICD-10-CM

## 2019-04-12 DIAGNOSIS — I48.91 ATRIAL FIBRILLATION, UNSPECIFIED TYPE: ICD-10-CM

## 2019-04-12 DIAGNOSIS — N18.30 CKD (CHRONIC KIDNEY DISEASE) STAGE 3, GFR 30-59 ML/MIN: Primary | ICD-10-CM

## 2019-04-12 DIAGNOSIS — Z94.0 IMMUNOSUPPRESSIVE MANAGEMENT ENCOUNTER FOLLOWING KIDNEY TRANSPLANT: ICD-10-CM

## 2019-04-12 DIAGNOSIS — M89.8X9 METABOLIC BONE DISEASE: ICD-10-CM

## 2019-04-12 DIAGNOSIS — Z79.01 LONG TERM (CURRENT) USE OF ANTICOAGULANTS: ICD-10-CM

## 2019-04-12 DIAGNOSIS — E78.2 MIXED HYPERLIPIDEMIA: ICD-10-CM

## 2019-04-12 DIAGNOSIS — Z94.0 KIDNEY REPLACED BY TRANSPLANT: Primary | ICD-10-CM

## 2019-04-12 DIAGNOSIS — Z94.0 H/O KIDNEY TRANSPLANT: ICD-10-CM

## 2019-04-12 DIAGNOSIS — I25.10 CORONARY ARTERY DISEASE INVOLVING NATIVE CORONARY ARTERY OF NATIVE HEART WITHOUT ANGINA PECTORIS: ICD-10-CM

## 2019-04-12 DIAGNOSIS — Z94.0 KIDNEY REPLACED BY TRANSPLANT: ICD-10-CM

## 2019-04-12 DIAGNOSIS — Z79.01 LONG TERM (CURRENT) USE OF ANTICOAGULANTS: Primary | ICD-10-CM

## 2019-04-12 DIAGNOSIS — R63.4 WEIGHT LOSS: ICD-10-CM

## 2019-04-12 LAB
ALBUMIN SERPL BCP-MCNC: 3.4 G/DL (ref 3.5–5.2)
ALBUMIN SERPL BCP-MCNC: 3.4 G/DL (ref 3.5–5.2)
ALP SERPL-CCNC: 72 U/L (ref 55–135)
ALT SERPL W/O P-5'-P-CCNC: 15 U/L (ref 10–44)
ANION GAP SERPL CALC-SCNC: 8 MMOL/L (ref 8–16)
AST SERPL-CCNC: 21 U/L (ref 10–40)
BASOPHILS # BLD AUTO: 0.02 K/UL (ref 0–0.2)
BASOPHILS NFR BLD: 0.3 % (ref 0–1.9)
BILIRUB DIRECT SERPL-MCNC: 0.2 MG/DL (ref 0.1–0.3)
BILIRUB SERPL-MCNC: 0.4 MG/DL (ref 0.1–1)
BUN SERPL-MCNC: 23 MG/DL (ref 8–23)
CALCIUM SERPL-MCNC: 8.8 MG/DL (ref 8.7–10.5)
CHLORIDE SERPL-SCNC: 109 MMOL/L (ref 95–110)
CHOLEST SERPL-MCNC: 130 MG/DL (ref 120–199)
CHOLEST/HDLC SERPL: 3.3 {RATIO} (ref 2–5)
CO2 SERPL-SCNC: 26 MMOL/L (ref 23–29)
CREAT SERPL-MCNC: 2 MG/DL (ref 0.5–1.4)
DIFFERENTIAL METHOD: ABNORMAL
EOSINOPHIL # BLD AUTO: 0.2 K/UL (ref 0–0.5)
EOSINOPHIL NFR BLD: 3.4 % (ref 0–8)
ERYTHROCYTE [DISTWIDTH] IN BLOOD BY AUTOMATED COUNT: 14.6 % (ref 11.5–14.5)
EST. GFR  (AFRICAN AMERICAN): 39.6 ML/MIN/1.73 M^2
EST. GFR  (NON AFRICAN AMERICAN): 34.3 ML/MIN/1.73 M^2
GLUCOSE SERPL-MCNC: 97 MG/DL (ref 70–110)
HCT VFR BLD AUTO: 40.2 % (ref 40–54)
HDLC SERPL-MCNC: 39 MG/DL (ref 40–75)
HDLC SERPL: 30 % (ref 20–50)
HGB BLD-MCNC: 12.6 G/DL (ref 14–18)
IMM GRANULOCYTES # BLD AUTO: 0.03 K/UL (ref 0–0.04)
IMM GRANULOCYTES NFR BLD AUTO: 0.4 % (ref 0–0.5)
INR PPP: 2.6 (ref 2–3)
LDLC SERPL CALC-MCNC: 69.4 MG/DL (ref 63–159)
LYMPHOCYTES # BLD AUTO: 1.8 K/UL (ref 1–4.8)
LYMPHOCYTES NFR BLD: 26.6 % (ref 18–48)
MCH RBC QN AUTO: 25.9 PG (ref 27–31)
MCHC RBC AUTO-ENTMCNC: 31.3 G/DL (ref 32–36)
MCV RBC AUTO: 83 FL (ref 82–98)
MONOCYTES # BLD AUTO: 0.6 K/UL (ref 0.3–1)
MONOCYTES NFR BLD: 9.1 % (ref 4–15)
NEUTROPHILS # BLD AUTO: 4 K/UL (ref 1.8–7.7)
NEUTROPHILS NFR BLD: 60.2 % (ref 38–73)
NONHDLC SERPL-MCNC: 91 MG/DL
NRBC BLD-RTO: 0 /100 WBC
PHOSPHATE SERPL-MCNC: 3.4 MG/DL (ref 2.7–4.5)
PLATELET # BLD AUTO: 148 K/UL (ref 150–350)
PMV BLD AUTO: 13.2 FL (ref 9.2–12.9)
POTASSIUM SERPL-SCNC: 3.7 MMOL/L (ref 3.5–5.1)
PROT SERPL-MCNC: 6.8 G/DL (ref 6–8.4)
RBC # BLD AUTO: 4.87 M/UL (ref 4.6–6.2)
SODIUM SERPL-SCNC: 143 MMOL/L (ref 136–145)
TACROLIMUS BLD-MCNC: 25.9 NG/ML (ref 5–15)
TRIGL SERPL-MCNC: 108 MG/DL (ref 30–150)
WBC # BLD AUTO: 6.7 K/UL (ref 3.9–12.7)

## 2019-04-12 PROCEDURE — 99999 PR PBB SHADOW E&M-EST. PATIENT-LVL IV: ICD-10-PCS | Mod: PBBFAC,,, | Performed by: INTERNAL MEDICINE

## 2019-04-12 PROCEDURE — 84165 PROTEIN E-PHORESIS SERUM: CPT | Mod: 26,,, | Performed by: PATHOLOGY

## 2019-04-12 PROCEDURE — 85610 POCT INR: ICD-10-PCS | Mod: QW,S$GLB,, | Performed by: INTERNAL MEDICINE

## 2019-04-12 PROCEDURE — 3077F SYST BP >= 140 MM HG: CPT | Mod: CPTII,S$GLB,, | Performed by: INTERNAL MEDICINE

## 2019-04-12 PROCEDURE — 93793 ANTICOAG MGMT PT WARFARIN: CPT | Mod: S$GLB,,, | Performed by: PHARMACIST

## 2019-04-12 PROCEDURE — 99215 OFFICE O/P EST HI 40 MIN: CPT | Mod: S$GLB,,, | Performed by: INTERNAL MEDICINE

## 2019-04-12 PROCEDURE — 99999 PR PBB SHADOW E&M-EST. PATIENT-LVL IV: CPT | Mod: PBBFAC,,, | Performed by: INTERNAL MEDICINE

## 2019-04-12 PROCEDURE — 80197 ASSAY OF TACROLIMUS: CPT

## 2019-04-12 PROCEDURE — 80069 RENAL FUNCTION PANEL: CPT

## 2019-04-12 PROCEDURE — 84165 PROTEIN E-PHORESIS SERUM: CPT

## 2019-04-12 PROCEDURE — 3008F PR BODY MASS INDEX (BMI) DOCUMENTED: ICD-10-PCS | Mod: CPTII,S$GLB,, | Performed by: INTERNAL MEDICINE

## 2019-04-12 PROCEDURE — 85025 COMPLETE CBC W/AUTO DIFF WBC: CPT

## 2019-04-12 PROCEDURE — 36415 COLL VENOUS BLD VENIPUNCTURE: CPT

## 2019-04-12 PROCEDURE — 93793 PR ANTICOAGULANT MGMT FOR PT TAKING WARFARIN: ICD-10-PCS | Mod: S$GLB,,, | Performed by: PHARMACIST

## 2019-04-12 PROCEDURE — 82247 BILIRUBIN TOTAL: CPT

## 2019-04-12 PROCEDURE — 3008F BODY MASS INDEX DOCD: CPT | Mod: CPTII,S$GLB,, | Performed by: INTERNAL MEDICINE

## 2019-04-12 PROCEDURE — 80061 LIPID PANEL: CPT

## 2019-04-12 PROCEDURE — 84165 PATHOLOGIST INTERPRETATION SPE: ICD-10-PCS | Mod: 26,,, | Performed by: PATHOLOGY

## 2019-04-12 PROCEDURE — 84075 ASSAY ALKALINE PHOSPHATASE: CPT

## 2019-04-12 PROCEDURE — 3079F PR MOST RECENT DIASTOLIC BLOOD PRESSURE 80-89 MM HG: ICD-10-PCS | Mod: CPTII,S$GLB,, | Performed by: INTERNAL MEDICINE

## 2019-04-12 PROCEDURE — 3077F PR MOST RECENT SYSTOLIC BLOOD PRESSURE >= 140 MM HG: ICD-10-PCS | Mod: CPTII,S$GLB,, | Performed by: INTERNAL MEDICINE

## 2019-04-12 PROCEDURE — 85610 PROTHROMBIN TIME: CPT | Mod: QW,S$GLB,, | Performed by: INTERNAL MEDICINE

## 2019-04-12 PROCEDURE — 3079F DIAST BP 80-89 MM HG: CPT | Mod: CPTII,S$GLB,, | Performed by: INTERNAL MEDICINE

## 2019-04-12 PROCEDURE — 99215 PR OFFICE/OUTPT VISIT, EST, LEVL V, 40-54 MIN: ICD-10-PCS | Mod: S$GLB,,, | Performed by: INTERNAL MEDICINE

## 2019-04-12 NOTE — PATIENT INSTRUCTIONS
1. Try to exercise more   2. Check and record your blood pressure especially when you feel lightheaded and take the logs to Dr. Latif  3. Make sure to follow-up with Dr. Latif and Dr. Magdaleno  4. See a dermatologist at least once a year   5. Stop smoking   6. Get labs done tomorrow so we can check what your kidney function and Prograf are   7. Drink more water

## 2019-04-12 NOTE — LETTER
April 13, 2019        Emre Latif  [address]  [address]  [facility] [address]  Phone: [phone]  Fax: [phone]             [facility]  [address]  [facility] [address]  Phone: [phone]   Patient: Ibrahima Phelps   MR Number: [id number]   YOB: 1955   Date of Visit: 4/12/2019       Dear Dr. Emre Latif    Thank you for referring Ibrahima Phelps to me for evaluation. Attached you will find relevant portions of my assessment and plan of care.    If you have questions, please do not hesitate to call me. I look forward to following Ibrahima Phelps along with you.    Sincerely,    Layne Chairez MD    Enclosure    If you would like to receive this communication electronically, please contact [email or web address] or [phone] to request [employer] access.    [employer] is a tool which provides read-only access to select patient information with whom you have a relationship. Its easy to use and provides real time access to review your patients record including encounter summaries, notes, results, and demographic information.    If you feel you have received this communication in error or would no longer like to receive these types of communications, please e-mail [email or web address]

## 2019-04-12 NOTE — PROGRESS NOTES
Pt seen by Allyson SHARP . I have reviewed her initial findings and agree with her assessment and plan

## 2019-04-12 NOTE — PROGRESS NOTES
Patient states he took Prograf prior to labs this morning thus he will repeat tomorrow (states weekend lab is better than weekday lab but this does not need to be followed up by on-call coordinator and attending this weekend unless critical value gets reported)

## 2019-04-12 NOTE — PROGRESS NOTES
Kidney Post-Transplant Assessment    Referring Physician:   Current Nephrologist: Emre Latif    ORGAN: LEFT KIDNEY  Donor Type: ?  PHS Increased Risk: ?  Cold Ischemia: 504 mins  Induction Medications: thymoglobulin    Subjective:     CC:  Reassessment of renal allograft function and management of chronic immunosuppression.    Kidney History:  Mr. Phelps is a 63 y.o. year old Black or  male with history of ESRD secondary to unclear etiology who was on dialysis started in ~Jan 1992 until receiving DDRT in July 1992 at [facility] [place] which failed secondary to rejection and he resumed dialysis in April 1996 until receiving DDRT #2 (THYMO x3 induction, CMV D-/R+) on 4/12/05. He had transplant nephrectomy of DDRT #1 (possibly in 1997 vs 2005 around time of second transplant). He has CKD stage 2/3 - GFR 30-89 and his baseline creatinine is between 1.6 to 1.8. He takes mycophenolate mofetil, prednisone and tacrolimus for maintenance immunosuppression.     He has history of A-fib s/p DCCV in 4/2009 on warfarin, pericarditis x3 episodes (last episode in Jan 2017), gout, CAD s/p stent placement to LAD in 2006.    Interval History: Patient was last seen in transplant nephrology clinic on 5/7/18. Since last visit he denies any hospitalizations but states he went to the ED for A-fib but can't remember when it was. He doesn't exercise but is still is working as a . He hasn't been checking BPs at home. States he is smoking 5 cigs/day     Review of Systems  Constitutional: +stays cold; +poor appetite sometimes; +lost 8 lbs since last transplant clinic visit; Negative for fever  HENT: +needs dental work; Negative for hearing loss, sore throat and mouth sores.   Eyes: Negative for photophobia, pain and visual disturbance.   Respiratory: +occasional DAUGHERTY - was able to walk from garage to clinic without symptoms; Negative for cough and wheezing.   Cardiovascular: +occasional palpitations associated with  A-fib; Negative for chest pain and leg swelling.   Gastrointestinal: Negative for nausea, vomiting, abdominal pain, diarrhea, constipation, blood in stool and abdominal distention.   Genitourinary: +urinary frequency; Negative for dysuria, urgency, frequency, hematuria, decreased urine volume, difficulty urinating.   Musculoskeletal: +arthritis  Skin: Negative for pallor, rash and wound.   Neurological: +right handed tremor for the last 30 years but states it is worse; he is right handed - offered referral to neurology for further evaluation since this does not seem to be consistent with Prograf toxicity since it is unilateral and pre-dated his first transplant even; +occasional sinus headaches; +occasional lightheadedness;  Negative for syncope, weakness  Hematological: +bruises easily - on warfarin; Negative for adenopathy.  Psychiatric/Behavioral: +sleep disturbance; Negative for confusion and dysphoric mood. The patient is not nervous/anxious.     Medications:  Current Outpatient Medications   Medication Sig Dispense Refill    aspirin (ADULT ASPIRIN EC LOW STRENGTH) 81 MG EC tablet Take 1 tablet by mouth Daily.      atorvastatin (LIPITOR) 10 MG tablet Take 1 tablet (10 mg total) by mouth once daily. 90 tablet 3    calcium carbonate (OS-TRISH) 500 mg calcium (1,250 mg) tablet Two by mouth twice a day 120 tablet 11    doxazosin (CARDURA) 4 MG tablet Take 1 tablet (4 mg total) by mouth every 12 (twelve) hours. 270 tablet 3    fexofenadine (ALLEGRA) 60 MG tablet Take 1 tablet by mouth Twice daily as needed.      furosemide (LASIX) 40 MG tablet Take 0.5 tablets (20 mg total) by mouth 2 (two) times daily. 180 tablet 1    gabapentin (NEURONTIN) 300 MG capsule 1 po q hs x 3 days, then 1 po bid x 3 days, then 1 po tid. (Patient taking differently: daily as needed. 1 po q hs x 3 days, then 1 po bid x 3 days, then 1 po tid.) 90 capsule 2    metoprolol tartrate (LOPRESSOR) 50 MG tablet Take 1 tablet (50 mg total) by  "mouth 2 (two) times daily. 180 tablet 3    multivitamin (THERAGRAN) per tablet Take 1 tablet by mouth Daily.      mycophenolate (CELLCEPT) 250 mg Cap TAKE 2 CAPSULES BY MOUTH EVERY 12 HOURS 120 capsule 11    nicotine (NICODERM CQ) 21 mg/24 hr Place 1 patch onto the skin once daily.  4    NIFEdipine (ADALAT CC) 60 MG TbSR Take 1 tablet (60 mg total) by mouth 2 (two) times daily. 180 tablet 3    paricalcitol (ZEMPLAR) 1 MCG capsule One po MWF 90 capsule 3    potassium chloride (KLOR-CON) 10 MEQ TbSR Take 1 tablet (10 mEq total) by mouth once daily. 90 tablet 3    predniSONE (DELTASONE) 5 MG tablet Take 1 tablet (5 mg total) by mouth every morning. 90 tablet 1    ranitidine (ZANTAC) 150 MG tablet Take 1 tablet (150 mg total) by mouth 2 (two) times daily. 180 tablet 6    spironolactone (ALDACTONE) 25 MG tablet Take 1 tablet (25 mg total) by mouth once daily. 90 tablet 3    tacrolimus (PROGRAF) 1 MG Cap TAKE 4 CAPSULES BY MOUTH IN THE MORNING AND THEN TAKE 3 CAPSULES BY MOUTH IN THE EVENING 210 capsule 11    triamcinolone acetonide 0.1% (KENALOG) 0.1 % cream AAA bid to rash on ankles 60 g 3    vitamin D 1000 units Tab Take 370 mg by mouth 2 (two) times daily. 2 tablets BID      warfarin (COUMADIN) 5 MG tablet Take 1 tablet (5 mg total) by mouth Daily. Or as directed by [facility] 30 tablet 5     No current facility-administered medications for this visit.          Objective:   Blood pressure (!) 149/83, pulse (!) 56, temperature 98 °F (36.7 °C), temperature source Oral, resp. rate 20, height 6' 0.36" (1.838 m), weight 102.2 kg (225 lb 5 oz), SpO2 99 %.body mass index is 30.25 kg/m².    Physical Exam  General: No acute distress, well groomed, alert and oriented x 3  HEENT: Normocephalic, atraumatic, EOM's intact bilaterally, external inspection of ears and nose normal, moist mucous membranes, no oral ulcerations/lesions  Neck: Supple, symmetrical, trachea midline, no thyromegaly, no JVD  Respiratory: " Clear to auscultation bilaterally, respirations unlabored, no rales/rhonchi/wheezing  Cardiovacular: irregularly irregular, S1, S2 normal, no murmurs, rubs or gallops  Gastrointestinal: Soft, non-tender, bowel sounds normal, no hepatosplenomegaly  LLQ renal allograft exam: No tenderness, no bruits, normal exam  Musculoskeletal: No knee or ankle joint tenderness or swelling.   Extremities: No clubbing or cyanosis of bilateral upper extremities; no lower extremity edema bilaterally, radial pulses 2+ bilaterally, symmetric  Skin: warm and dry; no rash on exposed skin  Neurologic: CN grossly intact      Labs:  Lab Results   Component Value Date    WBC 6.70 04/12/2019    HGB 12.6 (L) 04/12/2019    HCT 40.2 04/12/2019     04/12/2019    K 3.7 04/12/2019     04/12/2019    CO2 26 04/12/2019    BUN 23 04/12/2019    CREATININE 2.0 (H) 04/12/2019    EGFRNONAA 34.3 (A) 04/12/2019    GLUCOSE 121 (H) 04/12/2005    CALCIUM 8.8 04/12/2019    PHOS 3.4 04/12/2019    MG 2.0 06/04/2018    ALBUMIN 3.4 (L) 04/12/2019    ALBUMIN 3.4 (L) 04/12/2019    AST 21 04/12/2019    ALT 15 04/12/2019       No results found for: EXTANC, EXTWBC, EXTSEGS, EXTPLATELETS, EXTHEMOGLOBI, EXTHEMATOCRI, EXTCREATININ, EXTSODIUM, EXTPOTASSIUM, EXTBUN, EXTCO2, EXTCALCIUM, EXTPHOSPHORU, EXTGLUCOSE, EXTALBUMIN, EXTAST, EXTALT, EXTBILITOTAL, EXTLIPASE, EXTAMYLASE    No results found for: EXTCYCLOSLVL, EXTSIROLIMUS, EXTTACROLVL, EXTPROTCRE, EXTPTHINTACT, EXTPROTEINUA, EXTWBCUA, EXTRBCUA    Labs were reviewed with the patient.    Assessment/Plan:     1. CKD (chronic kidney disease) stage 3, GFR 30-59 ml/min    2. H/O kidney transplant    3. Metabolic bone disease    4. Mixed hyperlipidemia    5. Atrial fibrillation, unspecified type    6. Long term (current) use of anticoagulants [V58.61]    7. Immunosuppressive management encounter following kidney transplant        Mr. Phelps is a 63 y.o. male with:     # History of ESRD secondary to unclear etiology who  was on dialysis started in ~Jan 1992 until receiving DDRT in July 1992 at [facility] [place] which failed secondary to rejection and he resumed dialysis in April 1996 until receiving DDRT #2 (THYMO x3 induction, CMV D-/R+) on 4/12/05: baseline Cr 1.6  to 1.8  - last Cr higher than baseline at 2.0 from 4/12/19 - could be related to CNI toxicity since do not know what his Prograf level truly is (he took meds prior to lab draw on 4/12/19) plus dehydration (serum sodium elevated at 143)  - last UPC 0.30 from 4/12/19  - encouraged patient to drink at least 2L a day  - patient to repeat labs on 4/13/19 - states weekend is better for him since he is still working as a    - advised him to continue following with his general nephrologist    # Immunosuppression:   - continue Prograf 4/3, last FK-506 level on higher end at 7.6 from 3/11/19 - level from 4/12/19 inaccurate since he took meds prior to lab draw  - continue  mg BID   - continue prednisone 5 mg daily   - will monitor closely for toxicities    # Infectious Surveillance:   - last CMV negative from 1/24/17  - last BK serum PCR negative from 2/2/19    # HTN: BP slightly elevated in clinic   - continue current anti-hypertensive regimen - defer adjustments to his general nephrologist   - advised patient to check and record home BPs especially when he feels lightheaded  - low salt and healthy life discussed with the patient    # Metabolic Bone Disease/Secondary Hyperparathyroidism: last calcium/phos normal   - continue calcium carbonate, zemplar, and vitamin D - adjustment per general nephrologist   - will monitor PTH, calcium, and phosphorus as per our center protocol    # Anemia of chronic disease: Hb stable at 12.6  - will continue monitoring as per our center guidelines. No indication for acute intervention today    # History of CAD s/p PCI to LAD in 2006  - continue ASA, beta blocker, statin     # A-fib:   - continue metoprolol   - continue K-DUR and calcium  supplements to optimized electrolytes   - continue warfarin     # Healthcare Maintenance:   - advised patient to see a dermatologist annually given increased risk of skin cancers with immunosuppressive medications  - counseled patient regarding smoking cessation    Follow-up:   Annual follow-up with kidney transplant clinic with repeat labs, including renal function panel with UA, urine protein/creatinine ratio, and drug trough level q3 months.  Patient also reminded to follow-up with general nephrologist.    Layne Chairez MD       Education:   Material provided to the patient.  Patient reminded to call with any health changes since these can be early signs of significant complications.  Also, I advised the patient to be sure any new medications or changes of old medications are discussed with either a pharmacist or physician knowledgeable with transplant to avoid rejection/drug toxicity related to significant drug interactions.    UNOS Patient Status  Functional Status: 100% - Normal, no complaints, no evidence of disease  Physical Capacity: No Limitations

## 2019-04-13 ENCOUNTER — LAB VISIT (OUTPATIENT)
Dept: LAB | Facility: HOSPITAL | Age: 64
End: 2019-04-13
Attending: INTERNAL MEDICINE
Payer: COMMERCIAL

## 2019-04-13 DIAGNOSIS — Z94.0 KIDNEY REPLACED BY TRANSPLANT: ICD-10-CM

## 2019-04-13 LAB
ALBUMIN SERPL BCP-MCNC: 3.5 G/DL (ref 3.5–5.2)
ANION GAP SERPL CALC-SCNC: 11 MMOL/L (ref 8–16)
BUN SERPL-MCNC: 21 MG/DL (ref 8–23)
CALCIUM SERPL-MCNC: 9 MG/DL (ref 8.7–10.5)
CHLORIDE SERPL-SCNC: 107 MMOL/L (ref 95–110)
CO2 SERPL-SCNC: 25 MMOL/L (ref 23–29)
CREAT SERPL-MCNC: 1.8 MG/DL (ref 0.5–1.4)
EST. GFR  (AFRICAN AMERICAN): 45 ML/MIN/1.73 M^2
EST. GFR  (NON AFRICAN AMERICAN): 38.9 ML/MIN/1.73 M^2
GLUCOSE SERPL-MCNC: 97 MG/DL (ref 70–110)
MAGNESIUM SERPL-MCNC: 1.7 MG/DL (ref 1.6–2.6)
PHOSPHATE SERPL-MCNC: 3.7 MG/DL (ref 2.7–4.5)
POTASSIUM SERPL-SCNC: 4 MMOL/L (ref 3.5–5.1)
SODIUM SERPL-SCNC: 143 MMOL/L (ref 136–145)

## 2019-04-13 PROCEDURE — 36415 COLL VENOUS BLD VENIPUNCTURE: CPT

## 2019-04-13 PROCEDURE — 83735 ASSAY OF MAGNESIUM: CPT

## 2019-04-13 PROCEDURE — 80069 RENAL FUNCTION PANEL: CPT

## 2019-04-13 PROCEDURE — 80197 ASSAY OF TACROLIMUS: CPT

## 2019-04-14 LAB — TACROLIMUS BLD-MCNC: 6 NG/ML (ref 5–15)

## 2019-04-15 ENCOUNTER — TELEPHONE (OUTPATIENT)
Dept: TRANSPLANT | Facility: CLINIC | Age: 64
End: 2019-04-15

## 2019-04-15 LAB
ALBUMIN SERPL ELPH-MCNC: 3.63 G/DL (ref 3.35–5.55)
ALPHA1 GLOB SERPL ELPH-MCNC: 0.33 G/DL (ref 0.17–0.41)
ALPHA2 GLOB SERPL ELPH-MCNC: 0.8 G/DL (ref 0.43–0.99)
B-GLOBULIN SERPL ELPH-MCNC: 0.76 G/DL (ref 0.5–1.1)
GAMMA GLOB SERPL ELPH-MCNC: 0.88 G/DL (ref 0.67–1.58)
PATHOLOGIST INTERPRETATION SPE: NORMAL
PROT SERPL-MCNC: 6.4 G/DL (ref 6–8.4)

## 2019-04-15 NOTE — TELEPHONE ENCOUNTER
Left voicemail message for pt, Dr Chairez has reviewed labs, prograf level in therapeutic range, no changes at present time.

## 2019-04-15 NOTE — TELEPHONE ENCOUNTER
----- Message from Layne Chairez MD sent at 4/15/2019  8:11 AM CDT -----  Available results reviewed and require no immediate action.

## 2019-05-06 RX ORDER — IBUPROFEN 200 MG
TABLET ORAL
Qty: 28 PATCH | Refills: 4 | Status: SHIPPED | OUTPATIENT
Start: 2019-05-06 | End: 2019-06-27 | Stop reason: SDUPTHER

## 2019-05-06 RX ORDER — AMOXICILLIN AND CLAVULANATE POTASSIUM 250; 125 MG/1; MG/1
1 TABLET, FILM COATED ORAL EVERY 8 HOURS
Qty: 21 TABLET | Refills: 0 | OUTPATIENT
Start: 2019-05-06 | End: 2019-05-13

## 2019-05-09 ENCOUNTER — ANTI-COAG VISIT (OUTPATIENT)
Dept: CARDIOLOGY | Facility: CLINIC | Age: 64
End: 2019-05-09
Payer: COMMERCIAL

## 2019-05-09 DIAGNOSIS — Z79.01 LONG TERM (CURRENT) USE OF ANTICOAGULANTS: Primary | ICD-10-CM

## 2019-05-09 LAB — INR PPP: 2.9 (ref 2–3)

## 2019-05-09 PROCEDURE — 85610 POCT INR: ICD-10-PCS | Mod: QW,S$GLB,, | Performed by: INTERNAL MEDICINE

## 2019-05-09 PROCEDURE — 85610 PROTHROMBIN TIME: CPT | Mod: QW,S$GLB,, | Performed by: INTERNAL MEDICINE

## 2019-05-09 PROCEDURE — 93793 PR ANTICOAGULANT MGMT FOR PT TAKING WARFARIN: ICD-10-PCS | Mod: S$GLB,,, | Performed by: PHARMACIST

## 2019-05-09 PROCEDURE — 93793 ANTICOAG MGMT PT WARFARIN: CPT | Mod: S$GLB,,, | Performed by: PHARMACIST

## 2019-05-24 RX ORDER — FUROSEMIDE 40 MG/1
20 TABLET ORAL 2 TIMES DAILY
Qty: 180 TABLET | Refills: 3 | Status: SHIPPED | OUTPATIENT
Start: 2019-05-24 | End: 2019-07-08 | Stop reason: SDUPTHER

## 2019-06-11 NOTE — PROGRESS NOTES
Patient cancelled 6/13/19 [facility] appointment [facility] and was rescheduled for 6/17/19 at clinic.

## 2019-06-15 ENCOUNTER — HOSPITAL ENCOUNTER (OUTPATIENT)
Facility: HOSPITAL | Age: 64
Discharge: HOME | End: 2019-06-17
Attending: EMERGENCY MEDICINE | Admitting: EMERGENCY MEDICINE
Payer: COMMERCIAL

## 2019-06-15 DIAGNOSIS — R07.9 CHEST PAIN AT REST: ICD-10-CM

## 2019-06-15 DIAGNOSIS — I50.30 (HFPEF) HEART FAILURE WITH PRESERVED EJECTION FRACTION: ICD-10-CM

## 2019-06-15 DIAGNOSIS — Z94.0 H/O KIDNEY TRANSPLANT: ICD-10-CM

## 2019-06-15 DIAGNOSIS — R07.9 CHEST PAIN: Primary | ICD-10-CM

## 2019-06-15 DIAGNOSIS — E78.2 MIXED HYPERLIPIDEMIA: ICD-10-CM

## 2019-06-15 DIAGNOSIS — E87.6 HYPOKALEMIA: ICD-10-CM

## 2019-06-15 DIAGNOSIS — N18.30 CKD (CHRONIC KIDNEY DISEASE) STAGE 3, GFR 30-59 ML/MIN: ICD-10-CM

## 2019-06-15 DIAGNOSIS — Z79.01 LONG TERM (CURRENT) USE OF ANTICOAGULANTS: ICD-10-CM

## 2019-06-15 DIAGNOSIS — I25.10 CORONARY ARTERY DISEASE INVOLVING NATIVE CORONARY ARTERY OF NATIVE HEART WITHOUT ANGINA PECTORIS: ICD-10-CM

## 2019-06-15 DIAGNOSIS — I48.0 PAROXYSMAL ATRIAL FIBRILLATION: ICD-10-CM

## 2019-06-15 PROCEDURE — 83880 ASSAY OF NATRIURETIC PEPTIDE: CPT

## 2019-06-15 PROCEDURE — 93005 ELECTROCARDIOGRAM TRACING: CPT

## 2019-06-15 PROCEDURE — 85610 PROTHROMBIN TIME: CPT

## 2019-06-15 PROCEDURE — 85025 COMPLETE CBC W/AUTO DIFF WBC: CPT

## 2019-06-15 PROCEDURE — 93010 EKG 12-LEAD: ICD-10-PCS | Mod: ,,, | Performed by: INTERNAL MEDICINE

## 2019-06-15 PROCEDURE — 84484 ASSAY OF TROPONIN QUANT: CPT

## 2019-06-15 PROCEDURE — 80053 COMPREHEN METABOLIC PANEL: CPT

## 2019-06-15 PROCEDURE — 99285 EMERGENCY DEPT VISIT HI MDM: CPT

## 2019-06-15 PROCEDURE — 93010 ELECTROCARDIOGRAM REPORT: CPT | Mod: ,,, | Performed by: INTERNAL MEDICINE

## 2019-06-15 PROCEDURE — 99285 PR EMERGENCY DEPT VISIT,LEVEL V: ICD-10-PCS | Mod: ,,, | Performed by: PHYSICIAN ASSISTANT

## 2019-06-15 PROCEDURE — 99285 EMERGENCY DEPT VISIT HI MDM: CPT | Mod: ,,, | Performed by: PHYSICIAN ASSISTANT

## 2019-06-16 PROBLEM — R07.9 CHEST PAIN: Status: ACTIVE | Noted: 2019-06-16

## 2019-06-16 PROBLEM — E87.6 HYPOKALEMIA: Status: ACTIVE | Noted: 2019-06-16

## 2019-06-16 LAB
ALBUMIN SERPL BCP-MCNC: 3.1 G/DL (ref 3.5–5.2)
ALP SERPL-CCNC: 68 U/L (ref 55–135)
ALT SERPL W/O P-5'-P-CCNC: 16 U/L (ref 10–44)
ANION GAP SERPL CALC-SCNC: 13 MMOL/L (ref 8–16)
ANION GAP SERPL CALC-SCNC: 9 MMOL/L (ref 8–16)
AST SERPL-CCNC: 21 U/L (ref 10–40)
BACTERIA #/AREA URNS AUTO: NORMAL /HPF
BASOPHILS # BLD AUTO: 0.02 K/UL (ref 0–0.2)
BASOPHILS # BLD AUTO: 0.02 K/UL (ref 0–0.2)
BASOPHILS NFR BLD: 0.2 % (ref 0–1.9)
BASOPHILS NFR BLD: 0.3 % (ref 0–1.9)
BILIRUB SERPL-MCNC: 0.3 MG/DL (ref 0.1–1)
BILIRUB UR QL STRIP: NEGATIVE
BNP SERPL-MCNC: 189 PG/ML (ref 0–99)
BUN SERPL-MCNC: 20 MG/DL (ref 8–23)
BUN SERPL-MCNC: 22 MG/DL (ref 8–23)
CALCIUM SERPL-MCNC: 7.7 MG/DL (ref 8.7–10.5)
CALCIUM SERPL-MCNC: 7.9 MG/DL (ref 8.7–10.5)
CHLORIDE SERPL-SCNC: 108 MMOL/L (ref 95–110)
CHLORIDE SERPL-SCNC: 111 MMOL/L (ref 95–110)
CLARITY UR REFRACT.AUTO: CLEAR
CO2 SERPL-SCNC: 20 MMOL/L (ref 23–29)
CO2 SERPL-SCNC: 24 MMOL/L (ref 23–29)
COLOR UR AUTO: ABNORMAL
CREAT SERPL-MCNC: 1.7 MG/DL (ref 0.5–1.4)
CREAT SERPL-MCNC: 1.8 MG/DL (ref 0.5–1.4)
DIFFERENTIAL METHOD: ABNORMAL
DIFFERENTIAL METHOD: ABNORMAL
EOSINOPHIL # BLD AUTO: 0.2 K/UL (ref 0–0.5)
EOSINOPHIL # BLD AUTO: 0.2 K/UL (ref 0–0.5)
EOSINOPHIL NFR BLD: 2.6 % (ref 0–8)
EOSINOPHIL NFR BLD: 2.6 % (ref 0–8)
ERYTHROCYTE [DISTWIDTH] IN BLOOD BY AUTOMATED COUNT: 14.5 % (ref 11.5–14.5)
ERYTHROCYTE [DISTWIDTH] IN BLOOD BY AUTOMATED COUNT: 14.5 % (ref 11.5–14.5)
EST. GFR  (AFRICAN AMERICAN): 45 ML/MIN/1.73 M^2
EST. GFR  (AFRICAN AMERICAN): 48.2 ML/MIN/1.73 M^2
EST. GFR  (NON AFRICAN AMERICAN): 38.9 ML/MIN/1.73 M^2
EST. GFR  (NON AFRICAN AMERICAN): 41.7 ML/MIN/1.73 M^2
GLUCOSE SERPL-MCNC: 103 MG/DL (ref 70–110)
GLUCOSE SERPL-MCNC: 90 MG/DL (ref 70–110)
GLUCOSE UR QL STRIP: NEGATIVE
HCT VFR BLD AUTO: 37.3 % (ref 40–54)
HCT VFR BLD AUTO: 37.7 % (ref 40–54)
HGB BLD-MCNC: 11.5 G/DL (ref 14–18)
HGB BLD-MCNC: 11.9 G/DL (ref 14–18)
HGB UR QL STRIP: NEGATIVE
HYALINE CASTS UR QL AUTO: 0 /LPF
IMM GRANULOCYTES # BLD AUTO: 0.04 K/UL (ref 0–0.04)
IMM GRANULOCYTES # BLD AUTO: 0.04 K/UL (ref 0–0.04)
IMM GRANULOCYTES NFR BLD AUTO: 0.5 % (ref 0–0.5)
IMM GRANULOCYTES NFR BLD AUTO: 0.6 % (ref 0–0.5)
INR PPP: 2 (ref 0.8–1.2)
INR PPP: 2.2 (ref 0.8–1.2)
KETONES UR QL STRIP: NEGATIVE
LEUKOCYTE ESTERASE UR QL STRIP: NEGATIVE
LYMPHOCYTES # BLD AUTO: 1.5 K/UL (ref 1–4.8)
LYMPHOCYTES # BLD AUTO: 1.7 K/UL (ref 1–4.8)
LYMPHOCYTES NFR BLD: 20.8 % (ref 18–48)
LYMPHOCYTES NFR BLD: 21.9 % (ref 18–48)
MAGNESIUM SERPL-MCNC: 1.7 MG/DL (ref 1.6–2.6)
MCH RBC QN AUTO: 25.5 PG (ref 27–31)
MCH RBC QN AUTO: 25.5 PG (ref 27–31)
MCHC RBC AUTO-ENTMCNC: 30.8 G/DL (ref 32–36)
MCHC RBC AUTO-ENTMCNC: 31.6 G/DL (ref 32–36)
MCV RBC AUTO: 81 FL (ref 82–98)
MCV RBC AUTO: 83 FL (ref 82–98)
MICROSCOPIC COMMENT: NORMAL
MONOCYTES # BLD AUTO: 0.6 K/UL (ref 0.3–1)
MONOCYTES # BLD AUTO: 0.8 K/UL (ref 0.3–1)
MONOCYTES NFR BLD: 8.1 % (ref 4–15)
MONOCYTES NFR BLD: 9.6 % (ref 4–15)
NEUTROPHILS # BLD AUTO: 4.7 K/UL (ref 1.8–7.7)
NEUTROPHILS # BLD AUTO: 5.4 K/UL (ref 1.8–7.7)
NEUTROPHILS NFR BLD: 66.3 % (ref 38–73)
NEUTROPHILS NFR BLD: 66.5 % (ref 38–73)
NITRITE UR QL STRIP: NEGATIVE
NRBC BLD-RTO: 0 /100 WBC
NRBC BLD-RTO: 0 /100 WBC
PH UR STRIP: 7 [PH] (ref 5–8)
PLATELET # BLD AUTO: 144 K/UL (ref 150–350)
PLATELET # BLD AUTO: 150 K/UL (ref 150–350)
PMV BLD AUTO: 12.5 FL (ref 9.2–12.9)
PMV BLD AUTO: 12.7 FL (ref 9.2–12.9)
POTASSIUM SERPL-SCNC: 2.9 MMOL/L (ref 3.5–5.1)
POTASSIUM SERPL-SCNC: 3.6 MMOL/L (ref 3.5–5.1)
PROT SERPL-MCNC: 6.1 G/DL (ref 6–8.4)
PROT UR QL STRIP: ABNORMAL
PROTHROMBIN TIME: 19.7 SEC (ref 9–12.5)
PROTHROMBIN TIME: 21.7 SEC (ref 9–12.5)
RBC # BLD AUTO: 4.51 M/UL (ref 4.6–6.2)
RBC # BLD AUTO: 4.66 M/UL (ref 4.6–6.2)
RBC #/AREA URNS AUTO: 0 /HPF (ref 0–4)
SODIUM SERPL-SCNC: 141 MMOL/L (ref 136–145)
SODIUM SERPL-SCNC: 144 MMOL/L (ref 136–145)
SP GR UR STRIP: 1.01 (ref 1–1.03)
TROPONIN I SERPL DL<=0.01 NG/ML-MCNC: 0.04 NG/ML (ref 0–0.03)
TROPONIN I SERPL DL<=0.01 NG/ML-MCNC: 0.24 NG/ML (ref 0–0.03)
TROPONIN I SERPL DL<=0.01 NG/ML-MCNC: 0.24 NG/ML (ref 0–0.03)
TROPONIN I SERPL DL<=0.01 NG/ML-MCNC: 0.31 NG/ML (ref 0–0.03)
URN SPEC COLLECT METH UR: ABNORMAL
WBC # BLD AUTO: 7.04 K/UL (ref 3.9–12.7)
WBC # BLD AUTO: 8.13 K/UL (ref 3.9–12.7)
WBC #/AREA URNS AUTO: 0 /HPF (ref 0–5)

## 2019-06-16 PROCEDURE — 36415 COLL VENOUS BLD VENIPUNCTURE: CPT

## 2019-06-16 PROCEDURE — 25000003 PHARM REV CODE 250: Performed by: PHYSICIAN ASSISTANT

## 2019-06-16 PROCEDURE — 83735 ASSAY OF MAGNESIUM: CPT

## 2019-06-16 PROCEDURE — 85025 COMPLETE CBC W/AUTO DIFF WBC: CPT

## 2019-06-16 PROCEDURE — 99220 PR INITIAL OBSERVATION CARE,LEVL III: CPT | Mod: ,,, | Performed by: PHYSICIAN ASSISTANT

## 2019-06-16 PROCEDURE — 80048 BASIC METABOLIC PNL TOTAL CA: CPT

## 2019-06-16 PROCEDURE — 84484 ASSAY OF TROPONIN QUANT: CPT | Mod: 91

## 2019-06-16 PROCEDURE — 85610 PROTHROMBIN TIME: CPT

## 2019-06-16 PROCEDURE — G0378 HOSPITAL OBSERVATION PER HR: HCPCS

## 2019-06-16 PROCEDURE — 84484 ASSAY OF TROPONIN QUANT: CPT

## 2019-06-16 PROCEDURE — 99220 PR INITIAL OBSERVATION CARE,LEVL III: ICD-10-PCS | Mod: ,,, | Performed by: PHYSICIAN ASSISTANT

## 2019-06-16 PROCEDURE — 25000003 PHARM REV CODE 250: Performed by: NURSE PRACTITIONER

## 2019-06-16 PROCEDURE — 81001 URINALYSIS AUTO W/SCOPE: CPT

## 2019-06-16 PROCEDURE — 63600175 PHARM REV CODE 636 W HCPCS: Performed by: PHYSICIAN ASSISTANT

## 2019-06-16 RX ORDER — TACROLIMUS 1 MG/1
3 CAPSULE ORAL 2 TIMES DAILY
Status: DISCONTINUED | OUTPATIENT
Start: 2019-06-16 | End: 2019-06-17 | Stop reason: HOSPADM

## 2019-06-16 RX ORDER — SPIRONOLACTONE 25 MG/1
25 TABLET ORAL DAILY
Status: DISCONTINUED | OUTPATIENT
Start: 2019-06-16 | End: 2019-06-17 | Stop reason: HOSPADM

## 2019-06-16 RX ORDER — FUROSEMIDE 20 MG/1
20 TABLET ORAL 2 TIMES DAILY
Status: DISCONTINUED | OUTPATIENT
Start: 2019-06-16 | End: 2019-06-17 | Stop reason: HOSPADM

## 2019-06-16 RX ORDER — ACETAMINOPHEN 325 MG/1
650 TABLET ORAL EVERY 4 HOURS PRN
Status: DISCONTINUED | OUTPATIENT
Start: 2019-06-16 | End: 2019-06-17 | Stop reason: HOSPADM

## 2019-06-16 RX ORDER — TACROLIMUS 1 MG/1
1 CAPSULE ORAL EVERY MORNING
Status: DISCONTINUED | OUTPATIENT
Start: 2019-06-16 | End: 2019-06-17 | Stop reason: HOSPADM

## 2019-06-16 RX ORDER — POTASSIUM CHLORIDE 750 MG/1
20 CAPSULE, EXTENDED RELEASE ORAL ONCE
Status: COMPLETED | OUTPATIENT
Start: 2019-06-16 | End: 2019-06-16

## 2019-06-16 RX ORDER — SODIUM CHLORIDE 0.9 % (FLUSH) 0.9 %
10 SYRINGE (ML) INJECTION
Status: DISCONTINUED | OUTPATIENT
Start: 2019-06-16 | End: 2019-06-17 | Stop reason: HOSPADM

## 2019-06-16 RX ORDER — PARICALCITOL 1 UG/1
1 CAPSULE, LIQUID FILLED ORAL
Status: DISCONTINUED | OUTPATIENT
Start: 2019-06-17 | End: 2019-06-17 | Stop reason: HOSPADM

## 2019-06-16 RX ORDER — NIFEDIPINE 30 MG/1
60 TABLET, EXTENDED RELEASE ORAL DAILY
Status: DISCONTINUED | OUTPATIENT
Start: 2019-06-16 | End: 2019-06-17

## 2019-06-16 RX ORDER — PREDNISONE 5 MG/1
5 TABLET ORAL EVERY MORNING
Status: DISCONTINUED | OUTPATIENT
Start: 2019-06-16 | End: 2019-06-17 | Stop reason: HOSPADM

## 2019-06-16 RX ORDER — ASPIRIN 81 MG/1
81 TABLET ORAL DAILY
Status: DISCONTINUED | OUTPATIENT
Start: 2019-06-16 | End: 2019-06-17 | Stop reason: HOSPADM

## 2019-06-16 RX ORDER — ATORVASTATIN CALCIUM 10 MG/1
10 TABLET, FILM COATED ORAL DAILY
Status: DISCONTINUED | OUTPATIENT
Start: 2019-06-16 | End: 2019-06-17 | Stop reason: HOSPADM

## 2019-06-16 RX ORDER — DOXAZOSIN 2 MG/1
4 TABLET ORAL EVERY 12 HOURS
Status: DISCONTINUED | OUTPATIENT
Start: 2019-06-16 | End: 2019-06-17 | Stop reason: HOSPADM

## 2019-06-16 RX ORDER — HYDROCODONE BITARTRATE AND ACETAMINOPHEN 5; 325 MG/1; MG/1
1 TABLET ORAL EVERY 4 HOURS PRN
Status: DISCONTINUED | OUTPATIENT
Start: 2019-06-16 | End: 2019-06-17 | Stop reason: HOSPADM

## 2019-06-16 RX ORDER — CARVEDILOL 3.12 MG/1
3.12 TABLET ORAL 2 TIMES DAILY
Status: DISCONTINUED | OUTPATIENT
Start: 2019-06-16 | End: 2019-06-17

## 2019-06-16 RX ORDER — WARFARIN SODIUM 5 MG/1
5 TABLET ORAL DAILY
Status: DISCONTINUED | OUTPATIENT
Start: 2019-06-16 | End: 2019-06-17 | Stop reason: HOSPADM

## 2019-06-16 RX ORDER — CHOLECALCIFEROL (VITAMIN D3) 25 MCG
2000 TABLET ORAL 2 TIMES DAILY
Status: DISCONTINUED | OUTPATIENT
Start: 2019-06-16 | End: 2019-06-16

## 2019-06-16 RX ORDER — MYCOPHENOLATE MOFETIL 250 MG/1
500 CAPSULE ORAL EVERY 12 HOURS
Status: DISCONTINUED | OUTPATIENT
Start: 2019-06-16 | End: 2019-06-17 | Stop reason: HOSPADM

## 2019-06-16 RX ORDER — ONDANSETRON 8 MG/1
8 TABLET, ORALLY DISINTEGRATING ORAL EVERY 8 HOURS PRN
Status: DISCONTINUED | OUTPATIENT
Start: 2019-06-16 | End: 2019-06-17 | Stop reason: HOSPADM

## 2019-06-16 RX ADMIN — CARVEDILOL 3.12 MG: 3.12 TABLET, FILM COATED ORAL at 09:06

## 2019-06-16 RX ADMIN — ASPIRIN 81 MG: 81 TABLET, COATED ORAL at 09:06

## 2019-06-16 RX ADMIN — NIFEDIPINE 60 MG: 30 TABLET, FILM COATED, EXTENDED RELEASE ORAL at 09:06

## 2019-06-16 RX ADMIN — DOXAZOSIN MESYLATE 4 MG: 2 TABLET ORAL at 09:06

## 2019-06-16 RX ADMIN — TACROLIMUS 3 MG: 1 CAPSULE ORAL at 06:06

## 2019-06-16 RX ADMIN — ATORVASTATIN CALCIUM 10 MG: 10 TABLET, FILM COATED ORAL at 09:06

## 2019-06-16 RX ADMIN — FUROSEMIDE 20 MG: 20 TABLET ORAL at 09:06

## 2019-06-16 RX ADMIN — CARVEDILOL 3.12 MG: 3.12 TABLET, FILM COATED ORAL at 11:06

## 2019-06-16 RX ADMIN — TACROLIMUS 1 MG: 1 CAPSULE ORAL at 09:06

## 2019-06-16 RX ADMIN — POTASSIUM CHLORIDE 20 MEQ: 750 CAPSULE, EXTENDED RELEASE ORAL at 02:06

## 2019-06-16 RX ADMIN — TACROLIMUS 3 MG: 1 CAPSULE ORAL at 09:06

## 2019-06-16 RX ADMIN — POTASSIUM BICARBONATE 50 MEQ: 977.5 TABLET, EFFERVESCENT ORAL at 01:06

## 2019-06-16 RX ADMIN — MYCOPHENOLATE MOFETIL 500 MG: 250 CAPSULE ORAL at 09:06

## 2019-06-16 RX ADMIN — MYCOPHENOLATE MOFETIL 500 MG: 250 CAPSULE ORAL at 08:06

## 2019-06-16 RX ADMIN — PREDNISONE 5 MG: 5 TABLET ORAL at 06:06

## 2019-06-16 NOTE — PROGRESS NOTES
[facility] Hospital Medicine  Progress Note    Patient Name: Ibrahima Phelps  MRN: [id number]  Patient Class: OP- Observation   Admission Date: 6/15/2019  Length of Stay: 0 days  Attending Physician: Lavon Nguyen MD  Primary Care Provider: Connie Magdaleno MD    [facility] Medicine Team: Networked reference to record PCT  Paula Emmanuel NP    Subjective:     Principal Problem:Chest pain      HPI:  Patient is a 63 year old male with PMHx of CAD, Afib on coumadin, HTN, HLD, thyroid disease, CKD stage 3, metabolic bone disease, and hx of kidney transplant in 2005 being admitted to observation for chest pain. He reports having sternal chest pain onset today at 10:00PM at rest. He describes the pain as constant and tightness and rates the pain 10/10. The pain does not radiate. He reports associated nausea and SOB. He has a history of MI with stent placement. Patient reports chest pain relieved with 2 NTG per EMS. Patient reports taking  mg PTA. He denies hx of PE or DVT, recent surgery, trauma, immobilization, initiation of hormone therapy, or active cancer. He denies fever,chills, vomiting, abd pain, dysuria, diarrhea, or constipation. He is a current everyday smoker and denies alcohol use.    In the ED, . Intake labs remarkable for troponin 0.042, , K 2.9, Cr 1.8 (baseline), INR 2.2. EKG with sinus rythym and PVCs. CXR clear.    Overview/Hospital Course:  Admitted to hospital medicine for chest pain rule out.  Troponin's elevated to 0.2-0.3, cards co managed feels he will not be able to walk effectively on the treadmill, and is requesting which ever nuclear exam is available for Monday.  B/p trending up to 189/, will add coreg as he has h/o CAD/ PCI in 2006 at time of his renal tx.     Dispo: f/u with cards to sign up for home digital htn / bp management program and med adjustment    Interval History: patient has been feeling more and more fatigued as of late.  No weight  changes. CP has resolved.  Lasted for about an hour then had NTG, which he does not have at home.     Review of Systems   Constitutional: Positive for fatigue. Negative for activity change and appetite change.        C/o feeling more and more fatigued lately.  No other distinctive qualities   HENT: Negative for congestion, sore throat and trouble swallowing.    Respiratory: Negative for cough and shortness of breath.    Cardiovascular: Negative for chest pain, palpitations and leg swelling.   Gastrointestinal: Negative for abdominal pain, constipation, diarrhea, nausea and vomiting.   Genitourinary: Negative for decreased urine volume, difficulty urinating and hematuria.   Musculoskeletal: Negative for back pain and myalgias.   Skin: Negative for color change, rash and wound.   Neurological: Negative for dizziness, weakness, light-headedness, numbness and headaches.   Hematological: Does not bruise/bleed easily.   Psychiatric/Behavioral: Negative for agitation, decreased concentration and sleep disturbance. The patient is not nervous/anxious.      Objective:     Vital Signs (Most Recent):  Temp: 98.2 °F (36.8 °C) (06/16/19 1107)  Pulse: 63 (06/16/19 1107)  Resp: 18 (06/16/19 1107)  BP: (!) 189/86 (06/16/19 1107)  SpO2: 100 % (06/16/19 1107) Vital Signs (24h Range):  Temp:  [96.6 °F (35.9 °C)-98.2 °F (36.8 °C)] 98.2 °F (36.8 °C)  Pulse:  [] 63  Resp:  [12-22] 18  SpO2:  [98 %-100 %] 100 %  BP: (127-189)/(68-92) 189/86     Weight: 102.5 kg (225 lb 15.5 oz)  Body mass index is 30.65 kg/m².    Intake/Output Summary (Last 24 hours) at 6/16/2019 1147  Last data filed at 6/16/2019 1130  Gross per 24 hour   Intake 90 ml   Output 1600 ml   Net -1510 ml      Physical Exam   Constitutional: He is oriented to person, place, and time. He appears well-developed and well-nourished. No distress.   HENT:   Head: Normocephalic and atraumatic.   Right Ear: External ear normal.   Left Ear: External ear normal.   Nose: Nose  normal.   Eyes: Conjunctivae are normal.   Neck: Normal range of motion. Neck supple. No JVD present.   Cardiovascular: Normal rate, regular rhythm, normal heart sounds and intact distal pulses.   No murmur heard.  Pulmonary/Chest: Effort normal and breath sounds normal. No respiratory distress. He has no wheezes. He has no rales.   Abdominal: Soft. Bowel sounds are normal. He exhibits no distension and no mass. There is no tenderness. There is no guarding.   Musculoskeletal: Normal range of motion. He exhibits no edema.   Neurological: He is alert and oriented to person, place, and time. No cranial nerve deficit or sensory deficit. Coordination normal.   Skin: Skin is warm and dry. No rash noted. He is not diaphoretic. No erythema.   Psychiatric: He has a normal mood and affect. His behavior is normal. Judgment and thought content normal.   Nursing note and vitals reviewed.      Significant Labs:   BMP:   Recent Labs   Lab 06/16/19  0358   GLU 90      K 3.6      CO2 24   BUN 20   CREATININE 1.7*   CALCIUM 7.9*   MG 1.7     CBC:   Recent Labs   Lab 06/15/19  2350 06/16/19  0358   WBC 7.04 8.13   HGB 11.9* 11.5*   HCT 37.7* 37.3*   * 150     Troponin:   Recent Labs   Lab 06/15/19  2350 06/16/19  0358 06/16/19  0659   TROPONINI 0.042* 0.241*  0.237* 0.313*       Significant Imaging: CXR: I have reviewed all pertinent results/findings within the past 24 hours and my personal findings are:  no evidence of pulm process      Assessment/Plan:      * Chest pain  CAD    Patient with history of PCI with stent to LAD in 2006 presented with onset of chest pain around 10pm. EMS noted to be in SVT at 200. He was given 2 SL NTG with improvement in chest pain. HR normalized on arrival to ED.  - EKG without ischemic changes  - initial troponin 0.042, will trend for 3  - continue asa and statin  - NTG PRN  - NPO at MN    Hypokalemia  K of 2.9 on admission now up to 3.6  - replete as needed  - daily BMP    Atrial  fibrillation  Long term use of anticoagulants    - daily INR  - continue coumadin 5mg daily  - consult PharmD    (HFpEF) heart failure with preserved ejection fraction  - appears compensated  -   - continue lasix 20mg BID  - continue spironolactone 25mg daily  - continue metoprolol 50mg BID  - strict Is/Os, daily weights    H/O kidney transplant  - continue prograf 4mg am and 3mg pm  - continue prednisone 5mg daily  - continue cellcept 500mg q12    Long term (current) use of anticoagulants [V58.61]  - daily INR currently 2.0  - continue coumadin 5mg daily  - consult PharmD      CKD (chronic kidney disease) stage 3, GFR 30-59 ml/min  - Baseline Cr 1.8 -1.7   - stable  - daily BMP    Mixed hyperlipidemia  - as above    CAD (coronary artery disease)  Patient with history of PCI in 2006 at time of his renal tx.  He cannot recall what his sx's were but he was in the hospital at the time.    - He is still actively smoking and is refusing to quit  - Presented with onset of chest pain around 10pm. EMS noted to be in SVT at 200. He was given 2 SL NTG with improvement in chest pain. HR normalized on arrival to ED.  - EKG without ischemic changes  - initial troponin 0.042, peaked at 0.3  - cards co managed agreed no LHC, most likely demand but to be on conservative side best to have nuc spect or PET whichever is available  - continue asa and statin  - NTG PRN  - NPO at MN to have nuc stress in am, whichever we are able to schedule in am        VTE Risk Mitigation (From admission, onward)        Ordered     warfarin (COUMADIN) tablet 5 mg  Daily      06/16/19 0212     Place sequential compression device  Until discontinued      06/16/19 0147     Place RASHAD hose  Until discontinued      06/16/19 0147     IP VTE HIGH RISK PATIENT  Once      06/16/19 0147                Paula Emmanuel NP  Department of Hospital Medicine   [facility]

## 2019-06-16 NOTE — HOSPITAL COURSE
Admitted to hospital medicine for chest pain rule out.  Troponin's elevated to 0.2-0.3, cards co managed feels he would not be able to walk effectively on the treadmill, so he had nuclear Spect, which was found to be negative.  During admit his SBP trended up to 189/; coreg added as he has h/o CAD/ PCI in 2006 at time of his renal tx (does not remember if he had symptoms or not).  B/p resolved with addition of medications.  Plan of care reviewed, he will need to enroll in Dig htn program via pcp or other provider.      Dispo: f/u with cards to sign up for home digital htn / bp management program and med adjustment

## 2019-06-16 NOTE — ASSESSMENT & PLAN NOTE
CAD    Patient with history of PCI with stent to LAD in 2006 presented with onset of chest pain around 10pm. EMS noted to be in SVT at 200. He was given 2 SL NTG with improvement in chest pain. HR normalized on arrival to ED.  - EKG without ischemic changes  - initial troponin 0.042, will trend for 3  - continue asa and statin  - NTG PRN  - NPO at MN

## 2019-06-16 NOTE — HPI
Patient is a 63 year old male with PMHx of CAD, Afib on coumadin, HTN, HLD, thyroid disease, CKD stage 3, metabolic bone disease, and hx of kidney transplant in 2005 being admitted to observation for chest pain. He reports having sternal chest pain onset today at 10:00PM at rest. He describes the pain as constant and tightness and rates the pain 10/10. The pain does not radiate. He reports associated nausea and SOB. He has a history of MI with stent placement. Patient reports chest pain relieved with 2 NTG per EMS. Patient reports taking  mg PTA. He denies hx of PE or DVT, recent surgery, trauma, immobilization, initiation of hormone therapy, or active cancer. He denies fever,chills, vomiting, abd pain, dysuria, diarrhea, or constipation. He is a current everyday smoker and denies alcohol use.    In the ED, . Intake labs remarkable for troponin 0.042, , K 2.9, Cr 1.8 (baseline), INR 2.2. EKG with sinus rythym and PVCs. CXR clear.

## 2019-06-16 NOTE — NURSING TRANSFER
Nursing Transfer Note  Pt arrived from the er via stretcher accompanied by wife and transporter.arrived on telemetry and tele maintained,SB at this time.denies chest pain.vss.safety precautions implemented.

## 2019-06-16 NOTE — ASSESSMENT & PLAN NOTE
- appears compensated  -   - continue lasix 20mg BID  - continue spironolactone 25mg daily  - continue metoprolol 50mg BID  - strict Is/Os, daily weights

## 2019-06-16 NOTE — PLAN OF CARE
Problem: Adult Inpatient Plan of Care  Goal: Plan of Care Review  Pt admitted and care plan initiated.denies chest pain.remains on telemetry SB to SR,Troponin elevated at 0.241.will obtain next troponin at 7am.safety precautions implemented.pt free of falls or injuries.NPO.continue plan of care.

## 2019-06-16 NOTE — SUBJECTIVE & OBJECTIVE
Interval History: patient has been feeling more and more fatigued as of late.  No weight changes. CP has resolved.  Lasted for about an hour then had NTG, which he does not have at home.     Review of Systems   Constitutional: Positive for fatigue. Negative for activity change and appetite change.        C/o feeling more and more fatigued lately.  No other distinctive qualities   HENT: Negative for congestion, sore throat and trouble swallowing.    Respiratory: Negative for cough and shortness of breath.    Cardiovascular: Negative for chest pain, palpitations and leg swelling.   Gastrointestinal: Negative for abdominal pain, constipation, diarrhea, nausea and vomiting.   Genitourinary: Negative for decreased urine volume, difficulty urinating and hematuria.   Musculoskeletal: Negative for back pain and myalgias.   Skin: Negative for color change, rash and wound.   Neurological: Negative for dizziness, weakness, light-headedness, numbness and headaches.   Hematological: Does not bruise/bleed easily.   Psychiatric/Behavioral: Negative for agitation, decreased concentration and sleep disturbance. The patient is not nervous/anxious.      Objective:     Vital Signs (Most Recent):  Temp: 98.2 °F (36.8 °C) (06/16/19 1107)  Pulse: 63 (06/16/19 1107)  Resp: 18 (06/16/19 1107)  BP: (!) 189/86 (06/16/19 1107)  SpO2: 100 % (06/16/19 1107) Vital Signs (24h Range):  Temp:  [96.6 °F (35.9 °C)-98.2 °F (36.8 °C)] 98.2 °F (36.8 °C)  Pulse:  [] 63  Resp:  [12-22] 18  SpO2:  [98 %-100 %] 100 %  BP: (127-189)/(68-92) 189/86     Weight: 102.5 kg (225 lb 15.5 oz)  Body mass index is 30.65 kg/m².    Intake/Output Summary (Last 24 hours) at 6/16/2019 1147  Last data filed at 6/16/2019 1130  Gross per 24 hour   Intake 90 ml   Output 1600 ml   Net -1510 ml      Physical Exam   Constitutional: He is oriented to person, place, and time. He appears well-developed and well-nourished. No distress.   HENT:   Head: Normocephalic and  atraumatic.   Right Ear: External ear normal.   Left Ear: External ear normal.   Nose: Nose normal.   Eyes: Conjunctivae are normal.   Neck: Normal range of motion. Neck supple. No JVD present.   Cardiovascular: Normal rate, regular rhythm, normal heart sounds and intact distal pulses.   No murmur heard.  Pulmonary/Chest: Effort normal and breath sounds normal. No respiratory distress. He has no wheezes. He has no rales.   Abdominal: Soft. Bowel sounds are normal. He exhibits no distension and no mass. There is no tenderness. There is no guarding.   Musculoskeletal: Normal range of motion. He exhibits no edema.   Neurological: He is alert and oriented to person, place, and time. No cranial nerve deficit or sensory deficit. Coordination normal.   Skin: Skin is warm and dry. No rash noted. He is not diaphoretic. No erythema.   Psychiatric: He has a normal mood and affect. His behavior is normal. Judgment and thought content normal.   Nursing note and vitals reviewed.      Significant Labs:   BMP:   Recent Labs   Lab 06/16/19  0358   GLU 90      K 3.6      CO2 24   BUN 20   CREATININE 1.7*   CALCIUM 7.9*   MG 1.7     CBC:   Recent Labs   Lab 06/15/19  2350 06/16/19  0358   WBC 7.04 8.13   HGB 11.9* 11.5*   HCT 37.7* 37.3*   * 150     Troponin:   Recent Labs   Lab 06/15/19  2350 06/16/19  0358 06/16/19  0659   TROPONINI 0.042* 0.241*  0.237* 0.313*       Significant Imaging: CXR: I have reviewed all pertinent results/findings within the past 24 hours and my personal findings are:  no evidence of pulm process

## 2019-06-16 NOTE — H&P
[facility] Hospital Medicine  History & Physical    Patient Name: Ibrahima Phelps  MRN: [id number]  Admission Date: 6/15/2019  Attending Physician: Noam Obrien III, MD   Primary Care Provider: Connie Magdaleno MD    [facility] Medicine Team: [facility] MED C Brittany Jerry PA-C     Patient information was obtained from patient, past medical records and ER records.     Subjective:     Principal Problem:Chest pain    Chief Complaint:   Chief Complaint   Patient presents with    Chest Pain     EMS reports patient called due to chest pain 10/10 onset 20 minutes prior to arrival. Pain Free after 2 Nitro. Pt. took 324 Aspirin prior to EMS arrival. Patient HR in 200s, SVT when EMS arrived, converted to A Fib 127.         HPI: Patient is a 63 year old male with PMHx of CAD, Afib on coumadin, HTN, HLD, thyroid disease, CKD stage 3, metabolic bone disease, and hx of kidney transplant in 2005 being admitted to observation for chest pain. He reports having sternal chest pain onset today at 10:00PM at rest. He describes the pain as constant and tightness and rates the pain 10/10. The pain does not radiate. He reports associated nausea and SOB. He has a history of MI with stent placement. Patient reports chest pain relieved with 2 NTG per EMS. Patient reports taking  mg PTA. He denies hx of PE or DVT, recent surgery, trauma, immobilization, initiation of hormone therapy, or active cancer. He denies fever,chills, vomiting, abd pain, dysuria, diarrhea, or constipation. He is a current everyday smoker and denies alcohol use.    In the ED, . Intake labs remarkable for troponin 0.042, , K 2.9, Cr 1.8 (baseline), INR 2.2. EKG with sinus rythym and PVCs. CXR clear.    Past Medical History:   Diagnosis Date    (HFpEF) heart failure with preserved ejection fraction 9/25/2017    Atrial fibrillation     CAD (coronary artery disease)     Diverticulosis     Fever blister     Heart attack 2006     Hyperlipidemia     Hypertension     Immunodeficiency due to treatment with immunosuppressive medication     Keloid cicatrix     Metabolic bone disease     Pericarditis     S/P kidney transplant     Thrombocytopenia     Thyroid disease     Unspecified disorder of kidney and ureter     Stage IIICKD       Past Surgical History:   Procedure Laterality Date    CARDIOVERSION  04/30/15    CHOLECYSTECTOMY      COLONOSCOPY  April 20, 2011    Diverticulosis, repeat recommended in 3 yrs., repeat colonoscopy 2014 revealed 2 polyps.  He should return in 5 years.    COLONOSCOPY N/A 9/29/2014    Performed by Chon Casper [facility] at [facility] ([address] [place] [address])    CORONARY ANGIOPLASTY WITH STENT PLACEMENT  9/13/2006    KIDNEY TRANSPLANT  2005    PARATHYROIDECTOMY         Review of patient's allergies indicates:   Allergen Reactions    Ace inhibitors      Other reaction(s): Swelling    Povidone-iodine      Other reaction(s): Itching  Other reaction(s): Itching    Verapamil Other (See Comments)     Other reaction(s): Unknown       No current facility-administered medications on file prior to encounter.      Current Outpatient Medications on File Prior to Encounter   Medication Sig    aspirin (ADULT ASPIRIN EC LOW STRENGTH) 81 MG EC tablet Take 1 tablet by mouth Daily.    atorvastatin (LIPITOR) 10 MG tablet Take 1 tablet (10 mg total) by mouth once daily.    calcium carbonate (OS-TRISH) 500 mg calcium (1,250 mg) tablet Two by mouth twice a day    doxazosin (CARDURA) 4 MG tablet Take 1 tablet (4 mg total) by mouth every 12 (twelve) hours.    furosemide (LASIX) 40 MG tablet Take 0.5 tablets (20 mg total) by mouth 2 (two) times daily.    metoprolol tartrate (LOPRESSOR) 50 MG tablet Take 1 tablet (50 mg total) by mouth 2 (two) times daily.    multivitamin (THERAGRAN) per tablet Take 1 tablet by mouth Daily.    mycophenolate (CELLCEPT) 250 mg Cap TAKE 2 CAPSULES BY MOUTH EVERY 12 HOURS    NIFEdipine (ADALAT CC) 60 MG TbSR  Take 1 tablet (60 mg total) by mouth 2 (two) times daily.    paricalcitol (ZEMPLAR) 1 MCG capsule One po MWF    potassium chloride (KLOR-CON) 10 MEQ TbSR Take 1 tablet (10 mEq total) by mouth once daily.    predniSONE (DELTASONE) 5 MG tablet Take 1 tablet (5 mg total) by mouth every morning.    spironolactone (ALDACTONE) 25 MG tablet Take 1 tablet (25 mg total) by mouth once daily.    tacrolimus (PROGRAF) 1 MG Cap TAKE 4 CAPSULES BY MOUTH IN THE MORNING AND THEN TAKE 3 CAPSULES BY MOUTH IN THE EVENING    vitamin D 1000 units Tab Take 370 mg by mouth 2 (two) times daily. 2 tablets BID    warfarin (COUMADIN) 5 MG tablet Take 1 tablet (5 mg total) by mouth Daily. Or as directed by [facility]    fexofenadine (ALLEGRA) 60 MG tablet Take 1 tablet by mouth Twice daily as needed.    furosemide (LASIX) 40 MG tablet TAKE 0.5 TABLETS (20 MG TOTAL) BY MOUTH 2 (TWO) TIMES DAILY.    gabapentin (NEURONTIN) 300 MG capsule 1 po q hs x 3 days, then 1 po bid x 3 days, then 1 po tid. (Patient taking differently: daily as needed. 1 po q hs x 3 days, then 1 po bid x 3 days, then 1 po tid.)    nicotine (NICODERM CQ) 21 mg/24 hr PLACE 1 PATCH ONTO THE SKIN ONCE DAILY.*NOT COVERED*    ranitidine (ZANTAC) 150 MG tablet Take 1 tablet (150 mg total) by mouth 2 (two) times daily.   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   Substance and Sexual Activity    Alcohol use: No    Drug use: No     Sexual activity: Yes     Partners: Female     Review of Systems   Constitutional: Negative for activity change, chills and fever.   HENT: Negative for trouble swallowing.    Eyes: Negative for photophobia and visual disturbance.   Respiratory: Positive for chest tightness and shortness of breath. Negative for wheezing.    Cardiovascular: Negative for chest pain, palpitations and leg swelling.   Gastrointestinal: Positive for nausea. Negative for abdominal pain, constipation, diarrhea and vomiting.   Genitourinary: Negative for dysuria, frequency, hematuria and urgency.   Musculoskeletal: Negative for arthralgias, back pain and gait problem.   Skin: Negative for color change and rash.   Neurological: Negative for dizziness, syncope, weakness, light-headedness, numbness and headaches.   Psychiatric/Behavioral: Negative for agitation and confusion. The patient is not nervous/anxious.      Objective:     Vital Signs (Most Recent):  Temp: 96.6 °F (35.9 °C) (06/16/19 0244)  Pulse: 65 (06/16/19 0244)  Resp: 16 (06/16/19 0244)  BP: (!) 174/80 (06/16/19 0244)  SpO2: 99 % (06/16/19 0244) Vital Signs (24h Range):  Temp:  [96.6 °F (35.9 °C)-98.2 °F (36.8 °C)] 96.6 °F (35.9 °C)  Pulse:  [] 65  Resp:  [12-22] 16  SpO2:  [98 %-99 %] 99 %  BP: (127-178)/(77-92) 174/80     Weight: 102.5 kg (225 lb 15.5 oz)  Body mass index is 30.65 kg/m².    Physical Exam   Constitutional: He is oriented to person, place, and time. He appears well-developed and well-nourished. No distress.   HENT:   Head: Normocephalic and atraumatic.   Mouth/Throat: No oropharyngeal exudate.   Eyes: Pupils are equal, round, and reactive to light. Conjunctivae and EOM are normal.   Neck: Normal range of motion. Neck supple.   Cardiovascular: Normal rate, regular rhythm, normal heart sounds and intact distal pulses.   Pulmonary/Chest: Effort normal and breath sounds normal. No respiratory distress. He has no wheezes.   Abdominal: Soft. Bowel sounds are normal.  He exhibits no distension. There is no tenderness.   Musculoskeletal: Normal range of motion. He exhibits no edema or tenderness.   Lymphadenopathy:     He has no cervical adenopathy.   Neurological: He is alert and oriented to person, place, and time. No cranial nerve deficit or sensory deficit. Coordination normal.   Skin: Skin is warm and dry. Capillary refill takes less than 2 seconds. No rash noted.   Psychiatric: He has a normal mood and affect. His behavior is normal. Judgment and thought content normal.   Nursing note and vitals reviewed.        CRANIAL NERVES     CN III, IV, VI   Pupils are equal, round, and reactive to light.  Extraocular motions are normal.        Significant Labs:   BMP:   Recent Labs   Lab 06/15/19  2350         K 2.9*   *   CO2 20*   BUN 22   CREATININE 1.8*   CALCIUM 7.7*     CBC:   Recent Labs   Lab 06/15/19  2350   WBC 7.04   HGB 11.9*   HCT 37.7*   *     Troponin:   Recent Labs   Lab 06/15/19  2350   TROPONINI 0.042*     All pertinent labs within the past 24 hours have been reviewed.    Significant Imaging: I have reviewed all pertinent imaging results/findings within the past 24 hours.    Assessment/Plan:     * Chest pain  CAD    Patient with history of stent in 2006 presented with onset of chest pain around 10pm. EMS noted to be in SVT at 200. He was given 2 SL NTG with improvement in chest pain. HR normalized on arrival to ED.  - EKG without ischemic changes  - initial troponin 0.042, will trend for 3  - continue asa and statin  - NTG PRN  - NPO at MN    (HFpEF) heart failure with preserved ejection fraction  - appears compensated  -   - continue lasix 20mg BID  - continue spironolactone 25mg daily  - continue metoprolol 50mg BID  - strict Is/Os, daily weights    Atrial fibrillation  Long term use of anticoagulants    - daily INR  - continue coumadin 5mg daily  - consult PharmD    H/O kidney transplant  - continue prograf 4mg am and 3mg pm  -  continue prednisone 5mg daily  - continue cellcept 500mg q12    CKD (chronic kidney disease) stage 3, GFR 30-59 ml/min  Baseline Cr 1.8  - stable  - daily BMP    Mixed hyperlipidemia  - as above      VTE Risk Mitigation (From admission, onward)        Ordered     warfarin (COUMADIN) tablet 5 mg  Daily      06/16/19 0212     Place sequential compression device  Until discontinued      06/16/19 0147     Place RASHAD hose  Until discontinued      06/16/19 0147     IP VTE HIGH RISK PATIENT  Once      06/16/19 0147             Brittany Jerry PA-C  Department of Hospital Medicine   [facility]

## 2019-06-16 NOTE — ASSESSMENT & PLAN NOTE
Patient with history of PCI in 2006 at time of his renal tx.  He cannot recall what his sx's were but he was in the hospital at the time.    - He is still actively smoking and is refusing to quit  - Presented with onset of chest pain around 10pm. EMS noted to be in SVT at 200. He was given 2 SL NTG with improvement in chest pain. HR normalized on arrival to ED.  - EKG without ischemic changes  - initial troponin 0.042, peaked at 0.3  - cards co managed agreed no LHC, most likely demand but to be on conservative side best to have nuc spect or PET whichever is available  - continue asa and statin  - NTG PRN  - NPO at MN to have nuc stress in am, whichever we are able to schedule in am

## 2019-06-16 NOTE — ASSESSMENT & PLAN NOTE
- continue prograf 4mg am and 3mg pm  - continue prednisone 5mg daily  - continue cellcept 500mg q12

## 2019-06-16 NOTE — ED NOTES
Telemetry Verification   Patient placed on Telemetry Box  Verified with War Room  Box # [id number]   Monitor Tech chinyere   Rate 60   Rhythm NSR

## 2019-06-16 NOTE — PLAN OF CARE
Discussed case and saw patient this am with CCU staff Dr. Couch regarding whether he can be cleared for an exercise stress test. Given that he does not exercise recommend patient get adenosine stress test or pet stress test.       Vitals:    06/16/19 0909   BP: (!) 152/68   Pulse:    Resp:    Temp:      General:Not distressed,  well developed male, oriented to person, place, and time.     HEENT: Normocephalic and atraumatic.    Cardiovascular: RRR, no murmurs rubs or gallops. DP pulses 2+  Pulmonary: Effort normal and breath sounds normal. No respiratory distress. No wheezes, no rales, no rhonchi.  Abdominal: Normoactive bowel sounds. Exhibits no distension. There is no tenderness. There is no rebound and no guarding.   Extremities: No clubbing, cyanosis, or edema  Skin: Warm and dry, No bruising, rash, ulceration, or jaundice  Neurological: No obvious focal motor or sensory defects, normal muscle tone.    Psychiatric: Normal mood and affect. behavior is normal. Thought content normal.     Vitals , EKG, labs reviewed and patient seen by staff and myself  at 9:45 this am    Please contact us if you have any questions.         Rebecca Rogers MD  [id number]  CCU resident

## 2019-06-16 NOTE — NURSING
Mr. Phelps had a repeat troponin at 0700 and result was 0.313 which I messaged to [facility] PRINCE Michel. The patient is awake alert, no complaints except for hunger, he remains NPO.

## 2019-06-16 NOTE — SUBJECTIVE & OBJECTIVE
Past Medical History:   Diagnosis Date    (HFpEF) heart failure with preserved ejection fraction 9/25/2017    Atrial fibrillation     CAD (coronary artery disease)     Diverticulosis     Fever blister     Heart attack 2006    Hyperlipidemia     Hypertension     Immunodeficiency due to treatment with immunosuppressive medication     Keloid cicatrix     Metabolic bone disease     Pericarditis     S/P kidney transplant     Thrombocytopenia     Thyroid disease     Unspecified disorder of kidney and ureter     Stage IIICKD       Past Surgical History:   Procedure Laterality Date    CARDIOVERSION  04/30/15    CHOLECYSTECTOMY      COLONOSCOPY  April 20, 2011    Diverticulosis, repeat recommended in 3 yrs., repeat colonoscopy 2014 revealed 2 polyps.  He should return in 5 years.    COLONOSCOPY N/A 9/29/2014    [facility]formed by [facility] at [facility] ([facility]    CORONARY ANGIOPLASTY WITH STENT PLACEMENT  9/13/2006    KIDNEY TRANSPLANT  2005    PARATHYROIDECTOMY         Review of patient's allergies indicates:   Allergen Reactions    Ace inhibitors      Other reaction(s): Swelling    Povidone-iodine      Other reaction(s): Itching  Other reaction(s): Itching    Verapamil Other (See Comments)     Other reaction(s): Unknown       No current facility-administered medications on file prior to encounter.      Current Outpatient Medications on File Prior to Encounter   Medication Sig    aspirin (ADULT ASPIRIN EC LOW STRENGTH) 81 MG EC tablet Take 1 tablet by mouth Daily.    atorvastatin (LIPITOR) 10 MG tablet Take 1 tablet (10 mg total) by mouth once daily.    calcium carbonate (OS-TRISH) 500 mg calcium (1,250 mg) tablet Two by mouth twice a day    doxazosin (CARDURA) 4 MG tablet Take 1 tablet (4 mg total) by mouth every 12 (twelve) hours.    furosemide (LASIX) 40 MG tablet Take 0.5 tablets (20 mg total) by mouth 2 (two) times daily.    metoprolol tartrate (LOPRESSOR) 50 MG tablet Take 1 tablet  (50 mg total) by mouth 2 (two) times daily.    multivitamin (THERAGRAN) per tablet Take 1 tablet by mouth Daily.    mycophenolate (CELLCEPT) 250 mg Cap TAKE 2 CAPSULES BY MOUTH EVERY 12 HOURS    NIFEdipine (ADALAT CC) 60 MG TbSR Take 1 tablet (60 mg total) by mouth 2 (two) times daily.    paricalcitol (ZEMPLAR) 1 MCG capsule One po MWF    potassium chloride (KLOR-CON) 10 MEQ TbSR Take 1 tablet (10 mEq total) by mouth once daily.    predniSONE (DELTASONE) 5 MG tablet Take 1 tablet (5 mg total) by mouth every morning.    spironolactone (ALDACTONE) 25 MG tablet Take 1 tablet (25 mg total) by mouth once daily.    tacrolimus (PROGRAF) 1 MG Cap TAKE 4 CAPSULES BY MOUTH IN THE MORNING AND THEN TAKE 3 CAPSULES BY MOUTH IN THE EVENING    vitamin D 1000 units Tab Take 370 mg by mouth 2 (two) times daily. 2 tablets BID    warfarin (COUMADIN) 5 MG tablet Take 1 tablet (5 mg total) by mouth Daily. Or as directed by [facility]    fexofenadine (ALLEGRA) 60 MG tablet Take 1 tablet by mouth Twice daily as needed.    furosemide (LASIX) 40 MG tablet TAKE 0.5 TABLETS (20 MG TOTAL) BY MOUTH 2 (TWO) TIMES DAILY.    gabapentin (NEURONTIN) 300 MG capsule 1 po q hs x 3 days, then 1 po bid x 3 days, then 1 po tid. (Patient taking differently: daily as needed. 1 po q hs x 3 days, then 1 po bid x 3 days, then 1 po tid.)    nicotine (NICODERM CQ) 21 mg/24 hr PLACE 1 PATCH ONTO THE SKIN ONCE DAILY.*NOT COVERED*    ranitidine (ZANTAC) 150 MG tablet Take 1 tablet (150 mg total) by mouth 2 (two) times daily.   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   Substance and Sexual Activity    Alcohol use: No    Drug use: No    Sexual activity: Yes     Partners: Female     Review of Systems   Constitutional: Negative for activity change, chills and fever.   HENT: Negative for trouble swallowing.    Eyes: Negative for photophobia and visual disturbance.   Respiratory: Positive for chest tightness and shortness of breath. Negative for wheezing.    Cardiovascular: Negative for chest pain, palpitations and leg swelling.   Gastrointestinal: Positive for nausea. Negative for abdominal pain, constipation, diarrhea and vomiting.   Genitourinary: Negative for dysuria, frequency, hematuria and urgency.   Musculoskeletal: Negative for arthralgias, back pain and gait problem.   Skin: Negative for color change and rash.   Neurological: Negative for dizziness, syncope, weakness, light-headedness, numbness and headaches.   Psychiatric/Behavioral: Negative for agitation and confusion. The patient is not nervous/anxious.      Objective:     Vital Signs (Most Recent):  Temp: 96.6 °F (35.9 °C) (06/16/19 0244)  Pulse: 65 (06/16/19 0244)  Resp: 16 (06/16/19 0244)  BP: (!) 174/80 (06/16/19 0244)  SpO2: 99 % (06/16/19 0244) Vital Signs (24h Range):  Temp:  [96.6 °F (35.9 °C)-98.2 °F (36.8 °C)] 96.6 °F (35.9 °C)  Pulse:  [] 65  Resp:  [12-22] 16  SpO2:  [98 %-99 %] 99 %  BP: (127-178)/(77-92) 174/80     Weight: 102.5 kg (225 lb 15.5 oz)  Body mass index is 30.65 kg/m².    Physical Exam   Constitutional: He is oriented to person, place, and time. He appears well-developed and well-nourished. No distress.   HENT:   Head: Normocephalic and atraumatic.   Mouth/Throat: No oropharyngeal exudate.   Eyes: Pupils are equal, round, and reactive to light. Conjunctivae and EOM are normal.   Neck: Normal range of motion. Neck supple.    Cardiovascular: Normal rate, regular rhythm, normal heart sounds and intact distal pulses.   Pulmonary/Chest: Effort normal and breath sounds normal. No respiratory distress. He has no wheezes.   Abdominal: Soft. Bowel sounds are normal. He exhibits no distension. There is no tenderness.   Musculoskeletal: Normal range of motion. He exhibits no edema or tenderness.   Lymphadenopathy:     He has no cervical adenopathy.   Neurological: He is alert and oriented to person, place, and time. No cranial nerve deficit or sensory deficit. Coordination normal.   Skin: Skin is warm and dry. Capillary refill takes less than 2 seconds. No rash noted.   Psychiatric: He has a normal mood and affect. His behavior is normal. Judgment and thought content normal.   Nursing note and vitals reviewed.        CRANIAL NERVES     CN III, IV, VI   Pupils are equal, round, and reactive to light.  Extraocular motions are normal.        Significant Labs:   BMP:   Recent Labs   Lab 06/15/19  2350         K 2.9*   *   CO2 20*   BUN 22   CREATININE 1.8*   CALCIUM 7.7*     CBC:   Recent Labs   Lab 06/15/19  2350   WBC 7.04   HGB 11.9*   HCT 37.7*   *     Troponin:   Recent Labs   Lab 06/15/19  2350   TROPONINI 0.042*     All pertinent labs within the past 24 hours have been reviewed.    Significant Imaging: I have reviewed all pertinent imaging results/findings within the past 24 hours.

## 2019-06-16 NOTE — ED PROVIDER NOTES
Encounter Date: 6/15/2019       History     Chief Complaint   Patient presents with    Chest Pain     EMS reports patient called due to chest pain 10/10 onset 20 minutes prior to arrival. Pain Free after 2 Nitro. Pt. took 324 Aspirin prior to EMS arrival. Patient HR in 200s, SVT when EMS arrived, converted to A Fib 127.      Patient is a 63 year old male with PMHx of CAD, Afib on coumadin, HTN, HLD, thyroid disease, CKD stage 3, metabolic bone disease, and hx of kidney transplant in 2005. He presents to the ED via EMS for chest pain. He reports having sternal chest pain onset today at 10:00PM at rest. Describes pain as constant and tightness. Rates pain 10/10. Denies pain radiation. Reports associated nausea and SOB. Reports hx of MI and cardiac stent. Denies home oxygen use. Patient reports chest pain relieved with 2 NTG per EMS. Patient reports taking  mg PTA. He denies hx of PE or DVT, recent surgery, trauma, immobilization, initiation of hormone therapy, or active cancer. He denies fever,chills, vomiting, abd pain, dysuria, diarrhea, or constipation. He is a current everyday smoker and denies alcohol use.    The history is provided by the patient and medical records. No  was used.     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  He should return in 5 years.    COLONOSCOPY N/A 9/29/2014    Performed by Chon [facility]per [facility] at [facility]    CORONARY ANGIOPLASTY WITH STENT PLACEMENT  9/13/2006    KIDNEY TRANSPLANT  2005    PARATHYROIDECTOMY       Family History   Problem Relation Age of Onset    No Known Problems Sister     No Known Problems Brother     Thyroid disease Mother         s/p surgery    Heart disease Father         had pacemaker    No Known Problems Sister     Kidney failure Sister     Kidney disease Sister     No Known Problems Sister     Kidney disease Brother     Kidney failure Brother         s/p transplant    Diabetes Mellitus Neg Hx     Stroke Neg Hx     Heart attack Neg Hx     Cancer Neg Hx     Celiac disease Neg Hx     Cirrhosis Neg Hx     Colon cancer Neg Hx     Esophageal cancer Neg Hx     Inflammatory bowel disease Neg Hx     Rectal cancer Neg Hx     Stomach cancer Neg Hx     Ulcerative colitis Neg Hx     Liver disease Neg Hx     Liver cancer Neg Hx     Crohn's disease Neg Hx     Melanoma Neg Hx      Social History     Tobacco Use    Smoking status: Current Every Day Smoker     Packs/day: 0.50     Years: 40.00     Pack years: 20.00     Types: Cigarettes    Smokeless tobacco: Never Used    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   Substance Use Topics    Alcohol use: No    Drug use: No     Review of Systems   Constitutional: Negative for fever.   HENT: Negative for sore throat.    Respiratory: Positive for shortness of breath.    Cardiovascular: Positive for chest pain.   Gastrointestinal: Positive  for nausea. Negative for abdominal pain and vomiting.   Genitourinary: Negative for dysuria.   Musculoskeletal: Negative for back pain.   Skin: Negative for rash.   Neurological: Negative for weakness.   Hematological: Does not bruise/bleed easily.       Physical Exam     Initial Vitals [06/15/19 2330]   BP Pulse Resp Temp SpO2   127/77 (!) 127 (!) 22 98.2 °F (36.8 °C) 99 %      MAP       --         Physical Exam    Vitals reviewed.  Constitutional: He appears well-developed and well-nourished. No distress.   HENT:   Head: Normocephalic.   Eyes: Conjunctivae are normal.   Neck: Normal range of motion.   Cardiovascular: Normal rate and regular rhythm.   No murmur heard.  Pulses:       Dorsalis pedis pulses are 2+ on the right side, and 2+ on the left side.   Pulmonary/Chest: Breath sounds normal. No respiratory distress. He has no wheezes. He has no rales.   Abdominal: Soft. Bowel sounds are normal. He exhibits no distension. There is no tenderness.   Musculoskeletal: Normal range of motion. He exhibits no edema.   Neurological: He is alert and oriented to person, place, and time.   Skin: Skin is warm and dry. No erythema.         ED Course   Procedures  Labs Reviewed   CBC W/ AUTO DIFFERENTIAL - Abnormal; Notable for the following components:       Result Value    Hemoglobin 11.9 (*)     Hematocrit 37.7 (*)     Mean Corpuscular Volume 81 (*)     Mean Corpuscular Hemoglobin 25.5 (*)     Mean Corpuscular Hemoglobin Conc 31.6 (*)     Platelets 144 (*)     Immature Granulocytes 0.6 (*)     All other components within normal limits   COMPREHENSIVE METABOLIC PANEL - Abnormal; Notable for the following components:    Potassium 2.9 (*)     Chloride 111 (*)     CO2 20 (*)     Creatinine 1.8 (*)     Calcium 7.7 (*)     Albumin 3.1 (*)     eGFR if  45.0 (*)     eGFR if non  38.9 (*)     All other components within normal limits   TROPONIN I - Abnormal; Notable for the following components:     "Troponin I 0.042 (*)     All other components within normal limits   B-TYPE NATRIURETIC PEPTIDE - Abnormal; Notable for the following components:     (*)     All other components within normal limits   PROTIME-INR - Abnormal; Notable for the following components:    Prothrombin Time 21.7 (*)     INR 2.2 (*)     All other components within normal limits     EKG Readings: (Independently Interpreted)   6/15/19 2343    Sinus rhythm with frequent PVCs.  Ventricular rate 105 beats per minute. Normal axis.  Normal QRS and QT intervals.  No ST segment elevation.  Inferolateral ST segment depression.  Normal T-wave morphology.       Imaging Results          X-Ray Chest AP Portable (Final result)  Result time 06/16/19 00:37:05    Final result by Sd Altamirano MD (06/16/19 00:37:05)                 Impression:      No acute cardiopulmonary finding identified on this single view.      Electronically signed by: Sd Altamirano MD  Date:    06/16/2019  Time:    00:37             Narrative:    EXAMINATION:  XR CHEST AP PORTABLE    CLINICAL HISTORY:  Provided history is "Chest Pain;  ".    TECHNIQUE:  One view of the chest.    COMPARISON:  CT chest, 02/05/2019.  Chest radiograph, 09/24/2017.    FINDINGS:  Cardiac wires overlie the chest.  Cardiac silhouette is magnified by portable technique but not felt to be enlarged.  Lungs demonstrate no focal area of consolidation.  No sizable pleural effusion.  No pneumothorax.                                 Medical Decision Making:   History:   Old Medical Records: I decided to obtain old medical records.  Independently Interpreted Test(s):   I have ordered and independently interpreted EKG Reading(s) - see prior notes  Clinical Tests:   Lab Tests: Ordered and Reviewed  Radiological Study: Ordered and Reviewed  Medical Tests: Ordered and Reviewed  Other:   I have discussed this case with another health care provider.       <> Summary of the Discussion: Case discussed with " hospital medicine for admission.    Additional MDM:   Heart Score:    History:          Highly suspicious.  ECG:             Normal  Age:               >65 years  Risk factors: >= 3 risk factors or history of atherosclerotic disease  Troponin:       1-2x normal limit  Final Score: 7        APC / Resident Notes:   Patient is a 63 year old male presents to the ED for emergent evaluation of chest pain.     Will order labs and imaging. Will continue to monitor.     Differential diagnoses include, but are not limited to: ACS, pleural effusion, pneumothorax, cardiac arrhythmia, peptic ulcer disease, or electrolyte imbalance.     No leukocytosis. Hemodynamically stable. Thrombocytopenia. Hypokalemia 2.9. Will replete PO while in ED. Initial troponin elevated 0.042. Minimally elevated . CXR found to have no acute cardiopulmonary process.     Will admit to medicine for observation. Serial troponin pending.     I have discussed and reviewed with my supervising physician.         Attending Attestation:             Attending ED Notes:   I evaluated this patient with the midlevel provider.  I had a face-to-face encounter during which I obtained history from the patient performed a physical examination. The patient presented with chest pain that resolved prior to ED arrival.  Patient has history of cardiovascular disease.  Patient was afebrile.  Blood pressure was elevated.  No tachycardia.  No respiratory distress. Clear breath sounds. EKG negative for acute ischemic changes.  Chest radiograph negative for acute processes.  I agree with the history, examination, assessment, and plan as documented by the MLP.  Noam Obrien III, M.D. - Attending    Clinical Impression:       ICD-10-CM ICD-9-CM   1. Chest pain R07.9 786.50   2. Hypokalemia E87.6 276.8         Disposition:   Disposition: Placed in Observation  Condition: Meera Pollock PA-C  06/16/19 0103       Noam Obrien III,  MD  06/17/19 034

## 2019-06-16 NOTE — NURSING
Troponin 0.241.pt denies chest pain.Dr Mojica notified.will draw next troponin at 7am.remains on tele,NSR.

## 2019-06-17 VITALS
BODY MASS INDEX: 29.82 KG/M2 | DIASTOLIC BLOOD PRESSURE: 63 MMHG | HEIGHT: 73 IN | TEMPERATURE: 98 F | OXYGEN SATURATION: 98 % | HEART RATE: 67 BPM | WEIGHT: 225 LBS | SYSTOLIC BLOOD PRESSURE: 135 MMHG | RESPIRATION RATE: 20 BRPM

## 2019-06-17 PROBLEM — R07.9 CHEST PAIN: Status: RESOLVED | Noted: 2019-06-16 | Resolved: 2019-06-17

## 2019-06-17 LAB
ANION GAP SERPL CALC-SCNC: 11 MMOL/L (ref 8–16)
AV INDEX (PROSTH): 0.61
AV MEAN GRADIENT: 7.63 MMHG
AV PEAK GRADIENT: 13.4 MMHG
AV VALVE AREA: 1.94 CM2
AV VELOCITY RATIO: 0.52
BASOPHILS # BLD AUTO: 0.03 K/UL (ref 0–0.2)
BASOPHILS NFR BLD: 0.5 % (ref 0–1.9)
BSA FOR ECHO PROCEDURE: 2.26 M2
BUN SERPL-MCNC: 20 MG/DL (ref 8–23)
CALCIUM SERPL-MCNC: 7.8 MG/DL (ref 8.7–10.5)
CHLORIDE SERPL-SCNC: 109 MMOL/L (ref 95–110)
CO2 SERPL-SCNC: 22 MMOL/L (ref 23–29)
CREAT SERPL-MCNC: 1.5 MG/DL (ref 0.5–1.4)
CV ECHO LV RWT: 0.32 CM
CV PHARM DOSE: 4 MG
CV STRESS BASE HR: 50 BPM
DIASTOLIC BLOOD PRESSURE: 74 MMHG
DIFFERENTIAL METHOD: ABNORMAL
DOP CALC AO PEAK VEL: 1.83 M/S
DOP CALC AO VTI: 44.93 CM
DOP CALC LVOT AREA: 3.2 CM2
DOP CALC LVOT DIAMETER: 2.02 CM
DOP CALC LVOT PEAK VEL: 0.95 M/S
DOP CALC LVOT STROKE VOLUME: 87.16 CM3
DOP CALCLVOT PEAK VEL VTI: 27.21 CM
E WAVE DECELERATION TIME: 216.4 MSEC
E/A RATIO: 2.06
E/E' RATIO: 11
ECHO LV POSTERIOR WALL: 0.92 CM (ref 0.6–1.1)
EOSINOPHIL # BLD AUTO: 0.4 K/UL (ref 0–0.5)
EOSINOPHIL NFR BLD: 5.5 % (ref 0–8)
ERYTHROCYTE [DISTWIDTH] IN BLOOD BY AUTOMATED COUNT: 14.6 % (ref 11.5–14.5)
EST. GFR  (AFRICAN AMERICAN): 56.1 ML/MIN/1.73 M^2
EST. GFR  (NON AFRICAN AMERICAN): 48.5 ML/MIN/1.73 M^2
FRACTIONAL SHORTENING: 37 % (ref 28–44)
GLUCOSE SERPL-MCNC: 82 MG/DL (ref 70–110)
HCT VFR BLD AUTO: 37 % (ref 40–54)
HGB BLD-MCNC: 11.5 G/DL (ref 14–18)
IMM GRANULOCYTES # BLD AUTO: 0.04 K/UL (ref 0–0.04)
IMM GRANULOCYTES NFR BLD AUTO: 0.6 % (ref 0–0.5)
INR PPP: 2 (ref 0.8–1.2)
INTERVENTRICULAR SEPTUM: 1.03 CM (ref 0.6–1.1)
LA MAJOR: 5.26 CM
LA MINOR: 5.36 CM
LA WIDTH: 4.75 CM
LEFT ATRIUM SIZE: 3.26 CM
LEFT ATRIUM VOLUME INDEX: 31.4 ML/M2
LEFT ATRIUM VOLUME: 69.89 CM3
LEFT INTERNAL DIMENSION IN SYSTOLE: 3.66 CM (ref 2.1–4)
LEFT VENTRICLE DIASTOLIC VOLUME INDEX: 75.15 ML/M2
LEFT VENTRICLE DIASTOLIC VOLUME: 167.36 ML
LEFT VENTRICLE MASS INDEX: 101.6 G/M2
LEFT VENTRICLE SYSTOLIC VOLUME INDEX: 25.5 ML/M2
LEFT VENTRICLE SYSTOLIC VOLUME: 56.7 ML
LEFT VENTRICULAR INTERNAL DIMENSION IN DIASTOLE: 5.81 CM (ref 3.5–6)
LEFT VENTRICULAR MASS: 226.21 G
LV LATERAL E/E' RATIO: 9.43
LV SEPTAL E/E' RATIO: 13.2
LYMPHOCYTES # BLD AUTO: 1.8 K/UL (ref 1–4.8)
LYMPHOCYTES NFR BLD: 27.8 % (ref 18–48)
MAGNESIUM SERPL-MCNC: 1.7 MG/DL (ref 1.6–2.6)
MCH RBC QN AUTO: 26 PG (ref 27–31)
MCHC RBC AUTO-ENTMCNC: 31.1 G/DL (ref 32–36)
MCV RBC AUTO: 84 FL (ref 82–98)
MONOCYTES # BLD AUTO: 0.6 K/UL (ref 0.3–1)
MONOCYTES NFR BLD: 9.1 % (ref 4–15)
MV PEAK A VEL: 0.32 M/S
MV PEAK E VEL: 0.66 M/S
NEUTROPHILS # BLD AUTO: 3.6 K/UL (ref 1.8–7.7)
NEUTROPHILS NFR BLD: 56.5 % (ref 38–73)
NRBC BLD-RTO: 0 /100 WBC
OHS CV CPX 1 MINUTE RECOVERY HEART RATE: 65 BPM
OHS CV CPX 85 PERCENT MAX PREDICTED HEART RATE MALE: 133
OHS CV CPX MAX PREDICTED HEART RATE: 156
OHS CV CPX PATIENT IS FEMALE: 0
OHS CV CPX PATIENT IS MALE: 1
OHS CV CPX PEAK DIASTOLIC BLOOD PRESSURE: 80 MMHG
OHS CV CPX PEAK HEAR RATE: 55 BPM
OHS CV CPX PEAK RATE PRESSURE PRODUCT: 8580
OHS CV CPX PEAK SYSTOLIC BLOOD PRESSURE: 156 MMHG
OHS CV CPX PERCENT MAX PREDICTED HEART RATE ACHIEVED: 35
OHS CV CPX RATE PRESSURE PRODUCT PRESENTING: 6800
PISA TR MAX VEL: 2.79 M/S
PLATELET # BLD AUTO: 153 K/UL (ref 150–350)
PMV BLD AUTO: 12.7 FL (ref 9.2–12.9)
POTASSIUM SERPL-SCNC: 3.9 MMOL/L (ref 3.5–5.1)
PROTHROMBIN TIME: 19.7 SEC (ref 9–12.5)
PULM VEIN S/D RATIO: 1.47
PV PEAK D VEL: 0.45 M/S
PV PEAK S VEL: 0.66 M/S
RA MAJOR: 5.08 CM
RA PRESSURE: 3 MMHG
RA WIDTH: 3.77 CM
RBC # BLD AUTO: 4.43 M/UL (ref 4.6–6.2)
RIGHT VENTRICULAR END-DIASTOLIC DIMENSION: 2.62 CM
SINUS: 3.62 CM
SODIUM SERPL-SCNC: 142 MMOL/L (ref 136–145)
STJ: 3.51 CM
STRESS ECHO TARGET HR: 132.6 BPM
SYSTOLIC BLOOD PRESSURE: 136 MMHG
TDI LATERAL: 0.07
TDI SEPTAL: 0.05
TDI: 0.06
TR MAX PG: 31.14 MMHG
TRICUSPID ANNULAR PLANE SYSTOLIC EXCURSION: 2.26 CM
TROPONIN I SERPL DL<=0.01 NG/ML-MCNC: 0.1 NG/ML (ref 0–0.03)
TV REST PULMONARY ARTERY PRESSURE: 34 MMHG
WBC # BLD AUTO: 6.4 K/UL (ref 3.9–12.7)

## 2019-06-17 PROCEDURE — 94761 N-INVAS EAR/PLS OXIMETRY MLT: CPT

## 2019-06-17 PROCEDURE — 85610 PROTHROMBIN TIME: CPT

## 2019-06-17 PROCEDURE — 36415 COLL VENOUS BLD VENIPUNCTURE: CPT

## 2019-06-17 PROCEDURE — 83735 ASSAY OF MAGNESIUM: CPT

## 2019-06-17 PROCEDURE — 84484 ASSAY OF TROPONIN QUANT: CPT

## 2019-06-17 PROCEDURE — 80048 BASIC METABOLIC PNL TOTAL CA: CPT

## 2019-06-17 PROCEDURE — 85025 COMPLETE CBC W/AUTO DIFF WBC: CPT

## 2019-06-17 PROCEDURE — 99217 PR OBSERVATION CARE DISCHARGE: ICD-10-PCS | Mod: ,,, | Performed by: NURSE PRACTITIONER

## 2019-06-17 PROCEDURE — 63600175 PHARM REV CODE 636 W HCPCS: Performed by: HOSPITALIST

## 2019-06-17 PROCEDURE — 25000003 PHARM REV CODE 250: Performed by: NURSE PRACTITIONER

## 2019-06-17 PROCEDURE — 25000003 PHARM REV CODE 250: Performed by: PHYSICIAN ASSISTANT

## 2019-06-17 PROCEDURE — 63600175 PHARM REV CODE 636 W HCPCS: Performed by: PHYSICIAN ASSISTANT

## 2019-06-17 PROCEDURE — G0378 HOSPITAL OBSERVATION PER HR: HCPCS

## 2019-06-17 PROCEDURE — 99217 PR OBSERVATION CARE DISCHARGE: CPT | Mod: ,,, | Performed by: NURSE PRACTITIONER

## 2019-06-17 RX ORDER — ACETAMINOPHEN 325 MG/1
650 TABLET ORAL EVERY 4 HOURS PRN
Refills: 0 | Status: ON HOLD | COMMUNITY
Start: 2019-06-17 | End: 2020-03-24 | Stop reason: HOSPADM

## 2019-06-17 RX ORDER — REGADENOSON 0.08 MG/ML
0.4 INJECTION, SOLUTION INTRAVENOUS
Status: COMPLETED | OUTPATIENT
Start: 2019-06-17 | End: 2019-06-17

## 2019-06-17 RX ORDER — NIFEDIPINE 90 MG/1
90 TABLET, EXTENDED RELEASE ORAL DAILY
Qty: 90 TABLET | Refills: 0 | Status: SHIPPED | OUTPATIENT
Start: 2019-06-18 | End: 2019-07-01 | Stop reason: SDUPTHER

## 2019-06-17 RX ORDER — NIFEDIPINE 30 MG/1
90 TABLET, EXTENDED RELEASE ORAL DAILY
Status: DISCONTINUED | OUTPATIENT
Start: 2019-06-17 | End: 2019-06-17 | Stop reason: HOSPADM

## 2019-06-17 RX ADMIN — ATORVASTATIN CALCIUM 10 MG: 10 TABLET, FILM COATED ORAL at 07:06

## 2019-06-17 RX ADMIN — DOXAZOSIN MESYLATE 4 MG: 2 TABLET ORAL at 07:06

## 2019-06-17 RX ADMIN — REGADENOSON 0.4 MG: 0.08 INJECTION, SOLUTION INTRAVENOUS at 12:06

## 2019-06-17 RX ADMIN — MYCOPHENOLATE MOFETIL 500 MG: 250 CAPSULE ORAL at 07:06

## 2019-06-17 RX ADMIN — FUROSEMIDE 20 MG: 20 TABLET ORAL at 07:06

## 2019-06-17 RX ADMIN — TACROLIMUS 1 MG: 1 CAPSULE ORAL at 07:06

## 2019-06-17 RX ADMIN — TACROLIMUS 3 MG: 1 CAPSULE ORAL at 07:06

## 2019-06-17 RX ADMIN — PREDNISONE 5 MG: 5 TABLET ORAL at 06:06

## 2019-06-17 RX ADMIN — SPIRONOLACTONE 25 MG: 25 TABLET, FILM COATED ORAL at 07:06

## 2019-06-17 RX ADMIN — ASPIRIN 81 MG: 81 TABLET, COATED ORAL at 07:06

## 2019-06-17 RX ADMIN — NIFEDIPINE 90 MG: 30 TABLET, FILM COATED, EXTENDED RELEASE ORAL at 07:06

## 2019-06-17 RX ADMIN — PARICALCITOL 1 MCG: 1 CAPSULE, LIQUID FILLED ORAL at 07:06

## 2019-06-17 NOTE — PLAN OF CARE
06/17/19 1438   Discharge Assessment   Assessment Type Discharge Planning Assessment   Confirmed/corrected address and phone number on facesheet? Yes   Assessment information obtained from? Patient;Caregiver   Communicated expected length of stay with patient/caregiver no   Prior to hospitilization cognitive status: Alert/Oriented   Prior to hospitalization functional status: Independent   Current cognitive status: Alert/Oriented   Current Functional Status: Independent   Facility Arrived From: home   Lives With spouse   Able to Return to Prior Arrangements yes   Is patient able to care for self after discharge? Yes   Who are your caregiver(s) and their phone number(s)? Shea Phelps(spouse)[phone]   Patient's perception of discharge disposition admitted as an inpatient   Readmission Within the Last 30 Days unable to assess   Patient currently being followed by outpatient case management? No   Patient currently receives any other outside agency services? No   Equipment Currently Used at Home none   Do you have any problems affording any of your prescribed medications? No   Is the patient taking medications as prescribed? yes   Does the patient have transportation home? Yes   Transportation Anticipated family or friend will provide  (spouse)   Does the patient receive services at the [facility]? No   Discharge Plan A Home   Discharge Plan B Home;Home Health   DME Needed Upon Discharge  none   Patient/Family in Agreement with Plan yes   PCP: Dr. Voss    [facility]  [id number] [facility]

## 2019-06-17 NOTE — PLAN OF CARE
Problem: Adult Inpatient Plan of Care  Goal: Plan of Care Review  Outcome: Ongoing (interventions implemented as appropriate)  Discharge summary reviewed with pt and pt's wife. Both verbalized understanding and all questions were answered. IV catheter and heart monitor d/c. Pt waiting on prescription to be delivered to the bedside. Refused transportation. Will ambulate off unit with wife.

## 2019-06-17 NOTE — PROGRESS NOTES
Wife called 06/17/19 to inform us that patient was admitted in ([facility] on 06/15/19. C/S

## 2019-06-17 NOTE — PLAN OF CARE
Problem: Adult Inpatient Plan of Care  Goal: Plan of Care Review  Pt safety maintained this shift. Strict I&O in place. Denies pain or discomfort. C/o restlessness. NPO for scheduled procedure this date. Cardiac monitoring in place. Encouraged to call for assistance as needed. Bed in low position. Call bell in reach. Side rails up x2. Will continue to monitor.

## 2019-06-18 ENCOUNTER — ANTI-COAG VISIT (OUTPATIENT)
Dept: CARDIOLOGY | Facility: CLINIC | Age: 64
End: 2019-06-18
Payer: COMMERCIAL

## 2019-06-18 DIAGNOSIS — Z79.01 LONG TERM (CURRENT) USE OF ANTICOAGULANTS: ICD-10-CM

## 2019-06-18 PROCEDURE — 93793 ANTICOAG MGMT PT WARFARIN: CPT | Mod: S$GLB,,,

## 2019-06-18 PROCEDURE — 93793 PR ANTICOAGULANT MGMT FOR PT TAKING WARFARIN: ICD-10-PCS | Mod: S$GLB,,,

## 2019-06-18 NOTE — PLAN OF CARE
06/18/19 0655   Final Note   Assessment Type Final Discharge Note   Anticipated Discharge Disposition Home   Hospital Follow Up  Appt(s) scheduled? Yes

## 2019-06-19 NOTE — PROGRESS NOTES
Admitted to observation 6/15-6/17 for chest pain. Calendar updated with inpatient warfarin doses/INRs. See calendar for dose and f/u plan.  
Advised pt due to recent hospital d/c [facility] would like a f/u sooner; pt refused f/u appt on 6/24 & request f/u scheduled on 7/1 in [facility] instead.   
PA made aware
PA made aware.

## 2019-06-20 NOTE — ASSESSMENT & PLAN NOTE
Patient with history of PCI in 2006 at time of his renal tx.  He cannot recall what his sx's were but he was in the hospital at the time.    - He is still actively smoking and is refusing to quit  - Presented with onset of chest pain around 10pm. EMS noted to be in SVT at 200. He was given 2 SL NTG with improvement in chest pain. HR normalized on arrival to ED.  - EKG without ischemic changes  - initial troponin 0.042, peaked at 0.3  - cards co managed agreed no LHC, most likely demand but to be on conservative side best to have nuc spect or PET whichever is available  - continue asa and statin  - NTG PRN  - s/p nuc stress negative

## 2019-06-20 NOTE — ASSESSMENT & PLAN NOTE
CAD    Patient with history of PCI with stent to LAD in 2006 presented with onset of chest pain around 10pm. EMS noted to be in SVT at 200. He was given 2 SL NTG with improvement in chest pain. HR normalized on arrival to ED.  - EKG without ischemic changes  - initial troponin 0.042, will trend for 3  - continue asa and statin  - NTG PRN  - Nuc spect negative for ischemia

## 2019-06-20 NOTE — ASSESSMENT & PLAN NOTE
- appears compensated  -   - continue lasix 20mg BID  - continue spironolactone 25mg daily  - continue metoprolol 50mg BID  - strict Is/Os, daily weights   Patient

## 2019-06-20 NOTE — DISCHARGE SUMMARY
[facility] Hospital Medicine  Discharge Summary      Patient Name: Ibrahima Phelps  MRN: [id number]  Admission Date: 6/15/2019  Hospital Length of Stay: 0 days  Discharge Date and Time: 6/17/2019  5:10 PM  Attending Physician: No att. providers found   Discharging Provider: Paula Emmanuel NP  Primary Care Provider: Connie Magdaleno MD  [facility] Medicine Team: [facility] MED  Paula Emmanuel NP    HPI:   Patient is a 63 year old male with PMHx of CAD, Afib on coumadin, HTN, HLD, thyroid disease, CKD stage 3, metabolic bone disease, and hx of kidney transplant in 2005 being admitted to observation for chest pain. He reports having sternal chest pain onset today at 10:00PM at rest. He describes the pain as constant and tightness and rates the pain 10/10. The pain does not radiate. He reports associated nausea and SOB. He has a history of MI with stent placement. Patient reports chest pain relieved with 2 NTG per EMS. Patient reports taking  mg PTA. He denies hx of PE or DVT, recent surgery, trauma, immobilization, initiation of hormone therapy, or active cancer. He denies fever,chills, vomiting, abd pain, dysuria, diarrhea, or constipation. He is a current everyday smoker and denies alcohol use.    In the ED, . Intake labs remarkable for troponin 0.042, , K 2.9, Cr 1.8 (baseline), INR 2.2. EKG with sinus rythym and PVCs. CXR clear.    * No surgery found *      Hospital Course:   Admitted to hospital medicine for chest pain rule out.  Troponin's elevated to 0.2-0.3, cards co managed feels he would not be able to walk effectively on the treadmill, so he had nuclear Spect, which was found to be negative.  During admit his SBP trended up to 189/; Nifedipine increased to 90 and low dose coreg 3.125 bid added as he has h/o CAD/ PCI in 2006 at time of his renal tx (does not remember if he had symptoms or not).  B/p resolved with addition of medications.  Plan of care reviewed, he  will need to enroll in Dig htn program via pcp or other provider.      Dispo: f/u with cards to sign up for home digital htn / bp management program and med adjustment     Consults:     Review of Systems   Constitutional: Positive for fatigue. Negative for activity change and appetite change.        C/o feeling more and more fatigued lately.  No other distinctive qualities   HENT: Negative for congestion, sore throat and trouble swallowing.    Respiratory: Negative for cough and shortness of breath.    Cardiovascular: Negative for chest pain, palpitations and leg swelling.   Gastrointestinal: Negative for abdominal pain, constipation, diarrhea, nausea and vomiting.   Genitourinary: Negative for decreased urine volume, difficulty urinating and hematuria.   Musculoskeletal: Negative for back pain and myalgias.   Skin: Negative for color change, rash and wound.   Neurological: Negative for dizziness, weakness, light-headedness, numbness and headaches.   Hematological: Does not bruise/bleed easily.   Psychiatric/Behavioral: Negative for agitation, decreased concentration and sleep disturbance. The patient is not nervous/anxious.      Physical Exam   Constitutional: He is oriented to person, place, and time. He appears well-developed and well-nourished. No distress.   HENT:   Head: Normocephalic and atraumatic.   Right Ear: External ear normal.   Left Ear: External ear normal.   Nose: Nose normal.   Eyes: Conjunctivae are normal.   Neck: Normal range of motion. Neck supple. No JVD present.   Cardiovascular: Normal rate, regular rhythm, normal heart sounds and intact distal pulses.   No murmur heard.  Pulmonary/Chest: Effort normal and breath sounds normal. No respiratory distress. He has no wheezes. He has no rales.   Abdominal: Soft. Bowel sounds are normal. He exhibits no distension and no mass. There is no tenderness. There is no guarding.   Musculoskeletal: Normal range of motion. He exhibits no edema.    Neurological: He is alert and oriented to person, place, and time. No cranial nerve deficit or sensory deficit. Coordination normal.   Skin: Skin is warm and dry. No rash noted. He is not diaphoretic. No erythema.   Psychiatric: He has a normal mood and affect. His behavior is normal. Judgment and thought content normal.     * Chest pain-resolved as of 6/17/2019  CAD    Patient with history of PCI with stent to LAD in 2006 presented with onset of chest pain around 10pm. EMS noted to be in SVT at 200. He was given 2 SL NTG with improvement in chest pain. HR normalized on arrival to ED.  - EKG without ischemic changes  - initial troponin 0.042, will trend for 3  - continue asa and statin  - NTG PRN  - Nuc spect negative for ischemia    Hypokalemia  K of 2.9 on admission now up to 3.9  - replete as needed  - daily BMP    Atrial fibrillation  Long term use of anticoagulants    - daily INR  - continue coumadin 5mg daily  - consult PharmD    (HFpEF) heart failure with preserved ejection fraction  - appears compensated  -   - continue lasix 20mg BID  - continue spironolactone 25mg daily  - continue metoprolol 50mg BID  - strict Is/Os, daily weights    H/O kidney transplant  - continue prograf 4mg am and 3mg pm  - continue prednisone 5mg daily  - continue cellcept 500mg q12    Long term (current) use of anticoagulants [V58.61]  - daily INR currently 2.0  - continue coumadin 5mg daily  - consult PharmD      CKD (chronic kidney disease) stage 3, GFR 30-59 ml/min  - Baseline Cr 1.8 -1.7, decreased to 1.5 on day of discharge  - stable  - daily BMP    Mixed hyperlipidemia  - as above    CAD (coronary artery disease)  Patient with history of PCI in 2006 at time of his renal tx.  He cannot recall what his sx's were but he was in the hospital at the time.    - He is still actively smoking and is refusing to quit  - Presented with onset of chest pain around 10pm. EMS noted to be in SVT at 200. He was given 2 SL NTG with  improvement in chest pain. HR normalized on arrival to ED.  - EKG without ischemic changes  - initial troponin 0.042, peaked at 0.3  - cards co managed agreed no LHC, most likely demand but to be on conservative side best to have nuc spect or PET whichever is available  - continue asa and statin  - NTG PRN  - s/p nuc stress negative      HTN:  Nifedipine increased to 90 mg and coreg added 3.125 bid, with instructions to hold.  Needs to enroll in digital htn program    Final Active Diagnoses:    Diagnosis Date Noted POA    Hypokalemia [E87.6] 06/16/2019 Yes    Atrial fibrillation [I48.91] 09/25/2017 Yes    (HFpEF) heart failure with preserved ejection fraction [I50.30] 09/25/2017 Yes    H/O kidney transplant [Z94.0] 07/10/2015 Not Applicable    Long term (current) use of anticoagulants [V58.61] [Z79.01] 05/04/2015 Not Applicable    CKD (chronic kidney disease) stage 3, GFR 30-59 ml/min [N18.3] 01/17/2014 Yes    Mixed hyperlipidemia [E78.2]  Yes    CAD (coronary artery disease) [I25.10]  Yes      Problems Resolved During this Admission:    Diagnosis Date Noted Date Resolved POA    PRINCIPAL PROBLEM:  Chest pain [R07.9] 06/16/2019 06/17/2019 Yes       Discharged Condition: good    Disposition: Home or Self Care    Follow Up:  Follow-up Information     Shagufta Blunt MD.    Specialty:  Cardiology  Why:  Dr Blunt's office will contact you to schedule follow up care. Call them on Thursday if you have not heard from them.  Contact information:  [address] [facility] [facility] [address] [phone]                 Patient Instructions:      Ambulatory referral to Cardiology   Referral Priority: Routine Referral Type: Consultation   Referral Reason: Specialty Services Required   Requested Specialty: Cardiology   Number of Visits Requested: 1     Diet Cardiac     Notify your health care provider if you experience any of the following:  temperature >100.4     Notify your health care provider if you experience any  of the following:  persistent nausea and vomiting or diarrhea     Notify your health care provider if you experience any of the following:  severe uncontrolled pain     Notify your health care provider if you experience any of the following:  redness, tenderness, or signs of infection (pain, swelling, redness, odor or green/yellow discharge around incision site)     Notify your health care provider if you experience any of the following:  difficulty breathing or increased cough     Notify your health care provider if you experience any of the following:  severe persistent headache     Notify your health care provider if you experience any of the following:  worsening rash     Notify your health care provider if you experience any of the following:  persistent dizziness, light-headedness, or visual disturbances     Notify your health care provider if you experience any of the following:  increased confusion or weakness     Activity as tolerated       Significant Diagnostic Studies: Labs: All labs within the past 24 hours have been reviewed    Pending Diagnostic Studies:     None         Medications:  Reconciled Home Medications:      Medication List      START taking these medications    acetaminophen 325 MG tablet  Commonly known as:  TYLENOL  Take 2 tablets (650 mg total) by mouth every 4 (four) hours as needed.     NIFEdipine 90 MG (OSM) 24 hr tablet  Commonly known as:  PROCARDIA-XL  Take 1 tablet (90 mg total) by mouth once daily.  Replaces:  NIFEdipine 60 MG Tbsr        CHANGE how you take these medications    furosemide 40 MG tablet  Commonly known as:  LASIX  TAKE 0.5 TABLETS (20 MG TOTAL) BY MOUTH 2 (TWO) TIMES DAILY.  What changed:  Another medication with the same name was removed. Continue taking this medication, and follow the directions you see here.     gabapentin 300 MG capsule  Commonly known as:  NEURONTIN  1 po q hs x 3 days, then 1 po bid x 3 days, then 1 po tid.  What changed:    · when to take  this  · reasons to take this  · additional instructions        CONTINUE taking these medications    ADULT ASPIRIN EC LOW STRENGTH 81 MG EC tablet  Generic drug:  aspirin  Take 1 tablet by mouth Daily.     ALLEGRA 60 MG tablet  Generic drug:  fexofenadine  Take 1 tablet by mouth Twice daily as needed.     atorvastatin 10 MG tablet  Commonly known as:  LIPITOR  Take 1 tablet (10 mg total) by mouth once daily.     calcium carbonate 500 mg calcium (1,250 mg) tablet  Commonly known as:  OS-TRISH  Two by mouth twice a day     doxazosin 4 MG tablet  Commonly known as:  CARDURA  Take 1 tablet (4 mg total) by mouth every 12 (twelve) hours.     metoprolol tartrate 50 MG tablet  Commonly known as:  LOPRESSOR  Take 1 tablet (50 mg total) by mouth 2 (two) times daily.     multivitamin per tablet  Commonly known as:  THERAGRAN  Take 1 tablet by mouth Daily.     mycophenolate 250 mg Cap  Commonly known as:  CELLCEPT  TAKE 2 CAPSULES BY MOUTH EVERY 12 HOURS     nicotine 21 mg/24 hr  Commonly known as:  NICODERM CQ  PLACE 1 PATCH ONTO THE SKIN ONCE DAILY.*NOT COVERED*     paricalcitol 1 MCG capsule  Commonly known as:  ZEMPLAR  One po MWF     potassium chloride 10 MEQ Tbsr  Commonly known as:  KLOR-CON  Take 1 tablet (10 mEq total) by mouth once daily.     predniSONE 5 MG tablet  Commonly known as:  DELTASONE  Take 1 tablet (5 mg total) by mouth every morning.     ranitidine 150 MG tablet  Commonly known as:  ZANTAC  Take 1 tablet (150 mg total) by mouth 2 (two) times daily.     spironolactone 25 MG tablet  Commonly known as:  ALDACTONE  Take 1 tablet (25 mg total) by mouth once daily.     tacrolimus 1 MG Cap  Commonly known as:  PROGRAF  TAKE 4 CAPSULES BY MOUTH IN THE MORNING AND THEN TAKE 3 CAPSULES BY MOUTH IN THE EVENING     triamcinolone acetonide 0.1% 0.1 % cream  Commonly known as:  KENALOG  AAA bid to rash on ankles     vitamin D 1000 units Tab  Commonly known as:  VITAMIN D3  Take 370 mg by mouth 2 (two) times daily. 2  tablets BID     warfarin 5 MG tablet  Commonly known as:  COUMADIN  Take 1 tablet (5 mg total) by mouth Daily. Or as directed by [facility] Clinic        STOP taking these medications    NIFEdipine 60 MG Tbsr  Commonly known as:  ADALAT CC  Replaced by:  NIFEdipine 90 MG (OSM) 24 hr tablet            Indwelling Lines/Drains at time of discharge:   Lines/Drains/Airways          None          Time spent on the discharge of patient: 45 minutes  Patient was seen and examined on the date of discharge and determined to be suitable for discharge.         Paula Emmanuel NP  Department of Hospital Medicine  [facility]

## 2019-06-27 RX ORDER — IBUPROFEN 200 MG
1 TABLET ORAL DAILY
Qty: 28 PATCH | Refills: 4 | Status: SHIPPED | OUTPATIENT
Start: 2019-06-27 | End: 2019-07-08 | Stop reason: SDUPTHER

## 2019-07-01 ENCOUNTER — ANTI-COAG VISIT (OUTPATIENT)
Dept: CARDIOLOGY | Facility: CLINIC | Age: 64
End: 2019-07-01
Payer: COMMERCIAL

## 2019-07-01 ENCOUNTER — OFFICE VISIT (OUTPATIENT)
Dept: CARDIOLOGY | Facility: CLINIC | Age: 64
End: 2019-07-01
Payer: COMMERCIAL

## 2019-07-01 ENCOUNTER — OFFICE VISIT (OUTPATIENT)
Dept: INTERNAL MEDICINE | Facility: CLINIC | Age: 64
End: 2019-07-01
Payer: COMMERCIAL

## 2019-07-01 ENCOUNTER — LAB VISIT (OUTPATIENT)
Dept: LAB | Facility: HOSPITAL | Age: 64
End: 2019-07-01
Attending: INTERNAL MEDICINE
Payer: COMMERCIAL

## 2019-07-01 VITALS
BODY MASS INDEX: 30.21 KG/M2 | HEIGHT: 73 IN | DIASTOLIC BLOOD PRESSURE: 70 MMHG | HEART RATE: 55 BPM | SYSTOLIC BLOOD PRESSURE: 144 MMHG | WEIGHT: 227.94 LBS

## 2019-07-01 DIAGNOSIS — D63.1 ANEMIA DUE TO STAGE 3 CHRONIC KIDNEY DISEASE: ICD-10-CM

## 2019-07-01 DIAGNOSIS — R63.4 WEIGHT LOSS: ICD-10-CM

## 2019-07-01 DIAGNOSIS — Z94.0 KIDNEY TRANSPLANT RECIPIENT: ICD-10-CM

## 2019-07-01 DIAGNOSIS — I48.0 PAROXYSMAL ATRIAL FIBRILLATION: ICD-10-CM

## 2019-07-01 DIAGNOSIS — N25.81 SECONDARY RENAL HYPERPARATHYROIDISM: ICD-10-CM

## 2019-07-01 DIAGNOSIS — Z79.01 LONG TERM (CURRENT) USE OF ANTICOAGULANTS: ICD-10-CM

## 2019-07-01 DIAGNOSIS — Z79.01 LONG TERM (CURRENT) USE OF ANTICOAGULANTS: Primary | ICD-10-CM

## 2019-07-01 DIAGNOSIS — R73.03 PREDIABETES: ICD-10-CM

## 2019-07-01 DIAGNOSIS — I48.91 ATRIAL FIBRILLATION WITH RAPID VENTRICULAR RESPONSE: ICD-10-CM

## 2019-07-01 DIAGNOSIS — R61 NIGHT SWEATS: ICD-10-CM

## 2019-07-01 DIAGNOSIS — I10 HYPERTENSION, UNSPECIFIED TYPE: Primary | ICD-10-CM

## 2019-07-01 DIAGNOSIS — Z79.01 LONG TERM CURRENT USE OF ANTICOAGULANT THERAPY: ICD-10-CM

## 2019-07-01 DIAGNOSIS — I13.0 HYPERTENSIVE HEART AND RENAL DISEASE WITH RENAL FAILURE, STAGE 1 THROUGH STAGE 4 OR UNSPECIFIED CHRONIC KIDNEY DISEASE, WITH HEART FAILURE: Primary | ICD-10-CM

## 2019-07-01 DIAGNOSIS — N18.30 ANEMIA DUE TO STAGE 3 CHRONIC KIDNEY DISEASE: ICD-10-CM

## 2019-07-01 DIAGNOSIS — E78.2 MIXED HYPERLIPIDEMIA: ICD-10-CM

## 2019-07-01 DIAGNOSIS — N18.30 CKD (CHRONIC KIDNEY DISEASE) STAGE 3, GFR 30-59 ML/MIN: ICD-10-CM

## 2019-07-01 DIAGNOSIS — I25.10 CORONARY ARTERY DISEASE INVOLVING NATIVE CORONARY ARTERY OF NATIVE HEART WITHOUT ANGINA PECTORIS: ICD-10-CM

## 2019-07-01 DIAGNOSIS — Z79.899 IMMUNODEFICIENCY DUE TO TREATMENT WITH IMMUNOSUPPRESSIVE MEDICATION: ICD-10-CM

## 2019-07-01 DIAGNOSIS — D84.821 IMMUNODEFICIENCY DUE TO TREATMENT WITH IMMUNOSUPPRESSIVE MEDICATION: ICD-10-CM

## 2019-07-01 DIAGNOSIS — Z94.0 H/O KIDNEY TRANSPLANT: ICD-10-CM

## 2019-07-01 LAB
ALBUMIN SERPL BCP-MCNC: 3.3 G/DL (ref 3.5–5.2)
ANION GAP SERPL CALC-SCNC: 12 MMOL/L (ref 8–16)
BASOPHILS # BLD AUTO: 0.04 K/UL (ref 0–0.2)
BASOPHILS NFR BLD: 0.5 % (ref 0–1.9)
BUN SERPL-MCNC: 26 MG/DL (ref 8–23)
CALCIUM SERPL-MCNC: 8.2 MG/DL (ref 8.7–10.5)
CHLORIDE SERPL-SCNC: 108 MMOL/L (ref 95–110)
CO2 SERPL-SCNC: 22 MMOL/L (ref 23–29)
CREAT SERPL-MCNC: 1.9 MG/DL (ref 0.5–1.4)
DIFFERENTIAL METHOD: ABNORMAL
EOSINOPHIL # BLD AUTO: 0.3 K/UL (ref 0–0.5)
EOSINOPHIL NFR BLD: 3.4 % (ref 0–8)
ERYTHROCYTE [DISTWIDTH] IN BLOOD BY AUTOMATED COUNT: 14.5 % (ref 11.5–14.5)
EST. GFR  (AFRICAN AMERICAN): 42.1 ML/MIN/1.73 M^2
EST. GFR  (NON AFRICAN AMERICAN): 36.4 ML/MIN/1.73 M^2
GLUCOSE SERPL-MCNC: 105 MG/DL (ref 70–110)
HCT VFR BLD AUTO: 40.1 % (ref 40–54)
HGB BLD-MCNC: 12.7 G/DL (ref 14–18)
IMM GRANULOCYTES # BLD AUTO: 0.04 K/UL (ref 0–0.04)
IMM GRANULOCYTES NFR BLD AUTO: 0.5 % (ref 0–0.5)
INR PPP: 2 (ref 2–3)
LYMPHOCYTES # BLD AUTO: 2 K/UL (ref 1–4.8)
LYMPHOCYTES NFR BLD: 26.5 % (ref 18–48)
MAGNESIUM SERPL-MCNC: 1.7 MG/DL (ref 1.6–2.6)
MCH RBC QN AUTO: 26 PG (ref 27–31)
MCHC RBC AUTO-ENTMCNC: 31.7 G/DL (ref 32–36)
MCV RBC AUTO: 82 FL (ref 82–98)
MONOCYTES # BLD AUTO: 0.7 K/UL (ref 0.3–1)
MONOCYTES NFR BLD: 8.9 % (ref 4–15)
NEUTROPHILS # BLD AUTO: 4.6 K/UL (ref 1.8–7.7)
NEUTROPHILS NFR BLD: 60.2 % (ref 38–73)
NRBC BLD-RTO: 0 /100 WBC
PHOSPHATE SERPL-MCNC: 3.1 MG/DL (ref 2.7–4.5)
PLATELET # BLD AUTO: 156 K/UL (ref 150–350)
PMV BLD AUTO: 13.3 FL (ref 9.2–12.9)
POTASSIUM SERPL-SCNC: 3.8 MMOL/L (ref 3.5–5.1)
RBC # BLD AUTO: 4.89 M/UL (ref 4.6–6.2)
SODIUM SERPL-SCNC: 142 MMOL/L (ref 136–145)
TACROLIMUS BLD-MCNC: 11.5 NG/ML (ref 5–15)
WBC # BLD AUTO: 7.62 K/UL (ref 3.9–12.7)

## 2019-07-01 PROCEDURE — 99214 PR OFFICE/OUTPT VISIT, EST, LEVL IV, 30-39 MIN: ICD-10-PCS | Mod: S$GLB,,, | Performed by: INTERNAL MEDICINE

## 2019-07-01 PROCEDURE — 3078F PR MOST RECENT DIASTOLIC BLOOD PRESSURE < 80 MM HG: ICD-10-PCS | Mod: CPTII,S$GLB,, | Performed by: INTERNAL MEDICINE

## 2019-07-01 PROCEDURE — 3077F SYST BP >= 140 MM HG: CPT | Mod: CPTII,S$GLB,, | Performed by: INTERNAL MEDICINE

## 2019-07-01 PROCEDURE — 3008F PR BODY MASS INDEX (BMI) DOCUMENTED: ICD-10-PCS | Mod: CPTII,S$GLB,, | Performed by: INTERNAL MEDICINE

## 2019-07-01 PROCEDURE — 3078F DIAST BP <80 MM HG: CPT | Mod: CPTII,S$GLB,, | Performed by: INTERNAL MEDICINE

## 2019-07-01 PROCEDURE — 3077F PR MOST RECENT SYSTOLIC BLOOD PRESSURE >= 140 MM HG: ICD-10-PCS | Mod: CPTII,S$GLB,, | Performed by: INTERNAL MEDICINE

## 2019-07-01 PROCEDURE — 99999 PR PBB SHADOW E&M-EST. PATIENT-LVL III: ICD-10-PCS | Mod: PBBFAC,,, | Performed by: INTERNAL MEDICINE

## 2019-07-01 PROCEDURE — 99999 PR PBB SHADOW E&M-EST. PATIENT-LVL III: CPT | Mod: PBBFAC,,, | Performed by: INTERNAL MEDICINE

## 2019-07-01 PROCEDURE — 3008F BODY MASS INDEX DOCD: CPT | Mod: CPTII,S$GLB,, | Performed by: INTERNAL MEDICINE

## 2019-07-01 PROCEDURE — 85025 COMPLETE CBC W/AUTO DIFF WBC: CPT

## 2019-07-01 PROCEDURE — 85610 PROTHROMBIN TIME: CPT | Mod: QW,S$GLB,, | Performed by: INTERNAL MEDICINE

## 2019-07-01 PROCEDURE — 85610 POCT INR: ICD-10-PCS | Mod: QW,S$GLB,, | Performed by: INTERNAL MEDICINE

## 2019-07-01 PROCEDURE — 99214 OFFICE O/P EST MOD 30 MIN: CPT | Mod: S$GLB,,, | Performed by: INTERNAL MEDICINE

## 2019-07-01 PROCEDURE — 80197 ASSAY OF TACROLIMUS: CPT

## 2019-07-01 PROCEDURE — 83735 ASSAY OF MAGNESIUM: CPT

## 2019-07-01 PROCEDURE — 36415 COLL VENOUS BLD VENIPUNCTURE: CPT

## 2019-07-01 PROCEDURE — 80069 RENAL FUNCTION PANEL: CPT

## 2019-07-01 RX ORDER — NIFEDIPINE 90 MG/1
90 TABLET, EXTENDED RELEASE ORAL DAILY
Qty: 90 TABLET | Refills: 3 | Status: SHIPPED | OUTPATIENT
Start: 2019-07-01 | End: 2019-07-06 | Stop reason: SDUPTHER

## 2019-07-01 RX ORDER — CARVEDILOL 12.5 MG/1
12.5 TABLET ORAL 2 TIMES DAILY WITH MEALS
Qty: 180 TABLET | Refills: 3 | Status: SHIPPED | OUTPATIENT
Start: 2019-07-01 | End: 2019-07-08 | Stop reason: SDUPTHER

## 2019-07-01 NOTE — PROGRESS NOTES
INR at goal. Medications and chart reviewed. No changes noted to necessitate adjustment of warfarin or follow-up plan. See calendar.  Pt reported to ED related to CAD w/ complaints of chest pain.  He has a cardiology follow up today. Pt denies any other changes.  Will maintain current regimen & follow up w/in 3 weeks.   Patient was re-educated on situations that would require placing a call to the Coumadin Clinic, including bleeding or unusual bruising issues, changes in health, diet or medications,upcoming procedures that require warfarin interruption, and missed Coumadin dose(s). Patient expressed understanding that avoidance of consistency with these parameters could cause fluctuations in INR, leading to more frequent visits and increase risk of adverse events.

## 2019-07-01 NOTE — PROGRESS NOTES
"    Cardiology Clinic Note  Reason for Visit: HTN    HPI:   62 y/o with PMH of CAD s/p PCI in 2006, AF on warfarin, HTN, HLD, CKD stage III, and a h/o kidney transplant in 2005 who was admitted 6/19 for chest pain. An MPI was negative. Supposedly, Coreg 3.125 mg BID was started for CAD. However, the medication was not prescribed, and the patient continues to take metoprolol tartrate 50 mg BID instead. The patient denies SOB, CP and performing exercise. He occasionally has DAUGHERTY at one block, but his DAUGHERTY depends on "what pep I have when I start walking". The patient denies CP on ambulation. He sleeps with one pillow beneath his head and denies PND. The patient denies LE swelling and palpitations.     MPI 6/17/19  1. Scintigraphically negative for ischemia or infarct.  2. The global left ventricular systolic function is normal with an LV ejection fraction of 55 % and mild LV dilatation.  Wall motion is normal.    TTE 6/17/19  · Normal left ventricular systolic function. The estimated ejection fraction is 55%. There is a suggestion of wall motion abnormalities  · Normal right ventricular systolic function.  · Mild tricuspid regurgitation.  · The estimated PA systolic pressure is 34 mm Hg  · Normal LV diastolic function.  · Normal central venous pressure (3 mm Hg).    Review of Systems   Constitution: Negative for chills and fever.   HENT: Negative for ear discharge.    Eyes: Negative for pain and visual disturbance.   Cardiovascular: Positive for dyspnea on exertion. Negative for chest pain, irregular heartbeat, leg swelling, orthopnea, palpitations, paroxysmal nocturnal dyspnea and syncope.   Respiratory: Negative for hemoptysis, shortness of breath and wheezing.    Skin: Negative for rash and suspicious lesions.   Musculoskeletal: Negative for joint pain and muscle weakness.   Gastrointestinal: Negative for abdominal pain, diarrhea, nausea and vomiting.   Genitourinary: Negative for dysuria and frequency. "   Neurological: Negative for focal weakness, headaches and tremors.       PMH:     Past Medical History:   Diagnosis Date    (HFpEF) heart failure with preserved ejection fraction 9/25/2017    Atrial fibrillation     CAD (coronary artery disease)     Diverticulosis     Fever blister     Heart attack 2006    Hyperlipidemia     Hypertension     Immunodeficiency due to treatment with immunosuppressive medication     Keloid cicatrix     Metabolic bone disease     Pericarditis     S/P kidney transplant     Thrombocytopenia     Thyroid disease     Unspecified disorder of kidney and ureter     Stage IIICKD     Past Surgical History:   Procedure Laterality Date    CARDIOVERSION  04/30/15    CHOLECYSTECTOMY      COLONOSCOPY  April 20, 2011    Diverticulosis, repeat recommended in 3 yrs., repeat colonoscopy 2014 revealed 2 polyps.  He should return in 5 years.    COLONOSCOPY N/A 9/29/2014    Performed by [facility] ([facility]    CORONARY ANGIOPLASTY WITH STENT PLACEMENT  9/13/2006    KIDNEY TRANSPLANT  2005    PARATHYROIDECTOMY       Allergies:     Review of patient's allergies indicates:   Allergen Reactions    Ace inhibitors Swelling    Verapamil Other (See Comments)     Other reaction(s): Unknown    Povidone-iodine Itching     Medications:     Current Outpatient Medications on File Prior to Visit   Medication Sig Dispense Refill    acetaminophen (TYLENOL) 325 MG tablet Take 2 tablets (650 mg total) by mouth every 4 (four) hours as needed.  0    aspirin (ADULT ASPIRIN EC LOW STRENGTH) 81 MG EC tablet Take 1 tablet by mouth Daily.      atorvastatin (LIPITOR) 10 MG tablet Take 1 tablet (10 mg total) by mouth once daily. 90 tablet 3    calcium carbonate (OS-TRISH) 500 mg calcium (1,250 mg) tablet Two by mouth twice a day 120 tablet 11    doxazosin (CARDURA) 4 MG tablet Take 1 tablet (4 mg total) by mouth every 12 (twelve) hours. 270 tablet 3    fexofenadine (ALLEGRA) 60 MG  tablet Take 1 tablet by mouth Twice daily as needed.      furosemide (LASIX) 40 MG tablet TAKE 0.5 TABLETS (20 MG TOTAL) BY MOUTH 2 (TWO) TIMES DAILY. 180 tablet 3    gabapentin (NEURONTIN) 300 MG capsule 1 po q hs x 3 days, then 1 po bid x 3 days, then 1 po tid. (Patient taking differently: daily as needed. 1 po q hs x 3 days, then 1 po bid x 3 days, then 1 po tid.) 90 capsule 2    metoprolol tartrate (LOPRESSOR) 50 MG tablet Take 1 tablet (50 mg total) by mouth 2 (two) times daily. 180 tablet 3    multivitamin (THERAGRAN) per tablet Take 1 tablet by mouth Daily.      mycophenolate (CELLCEPT) 250 mg Cap TAKE 2 CAPSULES BY MOUTH EVERY 12 HOURS 120 capsule 11    nicotine (NICODERM CQ) 21 mg/24 hr Place 1 patch onto the skin once daily. 28 patch 4    NIFEdipine (PROCARDIA-XL) 90 MG (OSM) 24 hr tablet Take 1 tablet (90 mg total) by mouth once daily. 90 tablet 3    paricalcitol (ZEMPLAR) 1 MCG capsule One po MWF 90 capsule 3    potassium chloride (KLOR-CON) 10 MEQ TbSR Take 1 tablet (10 mEq total) by mouth once daily. 90 tablet 3    predniSONE (DELTASONE) 5 MG tablet Take 1 tablet (5 mg total) by mouth every morning. 90 tablet 1    spironolactone (ALDACTONE) 25 MG tablet Take 1 tablet (25 mg total) by mouth once daily. 90 tablet 3    tacrolimus (PROGRAF) 1 MG Cap TAKE 4 CAPSULES BY MOUTH IN THE MORNING AND THEN TAKE 3 CAPSULES BY MOUTH IN THE EVENING 210 capsule 11    triamcinolone acetonide 0.1% (KENALOG) 0.1 % cream AAA bid to rash on ankles 60 g 3    vitamin D 1000 units Tab Take 370 mg by mouth 2 (two) times daily. 2 tablets BID      warfarin (COUMADIN) 5 MG tablet Take 1 tablet (5 mg total) by mouth Daily. Or as directed by Coumadin Clinic 30 tablet 5    ranitidine (ZANTAC) 150 MG tablet Take 1 tablet (150 mg total) by mouth 2 (two) times daily. 180 tablet 6    [DISCONTINUED] NIFEdipine (PROCARDIA-XL) 90 MG (OSM) 24 hr tablet Take 1 tablet (90 mg total) by mouth once daily. 90 tablet 0     No  current facility-administered medications on file prior to visit.      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   Substance Use Topics    Alcohol use: No     Family History:     Family History   Problem Relation Age of Onset    No Known Problems Sister     No Known Problems Brother     Thyroid disease Mother         s/p surgery    Heart disease Father         had pacemaker    No Known Problems Sister     Kidney failure Sister     Kidney disease Sister     No Known Problems Sister     Kidney disease Brother     Kidney failure Brother         s/p transplant    Diabetes Mellitus Neg Hx     Stroke Neg Hx     Heart attack Neg Hx     Cancer Neg Hx     Celiac disease Neg Hx     Cirrhosis Neg Hx     Colon cancer Neg Hx     Esophageal cancer Neg Hx     Inflammatory bowel disease Neg Hx     Rectal cancer Neg Hx     Stomach cancer Neg Hx     Ulcerative colitis Neg Hx     Liver disease Neg Hx     Liver cancer Neg Hx     Crohn's disease Neg Hx     Melanoma Neg Hx        Physical Exam   Constitutional: He is oriented to person, place, and time. He appears well-developed and well-nourished.   HENT:   Head: Normocephalic and atraumatic.   Eyes: EOM are normal.   Cardiovascular: Regular rhythm. Bradycardia present. Exam reveals no gallop and no friction rub.   Pulmonary/Chest: Effort normal and breath sounds normal. No stridor. He has no wheezes. He has no rales.   Abdominal: Soft. Bowel sounds are normal. There is no rebound and no guarding.   Musculoskeletal: He exhibits no edema.   Neurological: He is alert and oriented to person, place, and time. No cranial nerve deficit.   Skin: Skin is warm and dry.   Psychiatric: He has a normal mood  "and affect. His behavior is normal.     BP (!) 144/70 (BP Location: Left arm, Patient Position: Sitting, BP Method: Medium (Automatic))   Pulse (!) 55   Ht 6' 1" (1.854 m)   Wt 103.4 kg (227 lb 15.3 oz)   BMI 30.08 kg/m²          Labs:     Lab Results   Component Value Date     07/01/2019    K 3.8 07/01/2019     07/01/2019    CO2 22 (L) 07/01/2019    BUN 26 (H) 07/01/2019    CREATININE 1.9 (H) 07/01/2019    GLUCOSE 121 (H) 04/12/2005    ANIONGAP 12 07/01/2019     Lab Results   Component Value Date    HGBA1C 5.8 (H) 02/02/2019     Lab Results   Component Value Date     (H) 06/15/2019     (H) 09/24/2017     (H) 09/24/2017    Lab Results   Component Value Date    WBC 7.62 07/01/2019    HGB 12.7 (L) 07/01/2019    HCT 40.1 07/01/2019     07/01/2019    GRAN 4.6 07/01/2019    GRAN 60.2 07/01/2019     Lab Results   Component Value Date    CHOL 130 04/12/2019    HDL 39 (L) 04/12/2019    LDLCALC 69.4 04/12/2019    TRIG 108 04/12/2019          No results found for: EF    Assessment and Plan  Ibrahima Phelps is a 64 y/o with PMH of CAD s/p PCI in 2006, AF on warfarin, HTN, HLD, CKD stage III, and a h/o kidney transplant in 2005 who presents for HTN.    HTN  - switch metoprolol to Coreg 12.5 mg BID  - c/w nifedipine and aldactone    HLD  - c/w statin    AF  - on warfarin    Signed:    Lawson Alaniz  "

## 2019-07-01 NOTE — PROGRESS NOTES
INTERNAL MEDICINE ESTABLISHED PATIENT VISIT NOTE    Subjective:     Chief Complaint: Follow-up  discharge f/u  F/u HTN, CAD     Patient ID: Ibrahima Phelps is a 63 y.o. male with HTN c CKD3 and hx renal transplant x2 in 2002 and 2005 and on immunosuppressive meds and secondary hyperPTH, HLD, A fib, CAD c chronic HF c preserved EF, COPD c baseline DAUGHERTY and tob use, thrombocytopenia now resolved, anemia of chronic disease, preDM, GERD, last seen by me in April, here today for f/u.    Has been w/u by me over the past few mos for night sweats and wt loss (see last note for details) c normal labs, neg blood cx, etc.    More recently, was admitted 6/15-6/17 p presented c chest pain.  Per discharge summary, pt had  c EMS, was given NTG and SVT had resolved on arrival to ED.  Had nuclear stress test which was neg for ischemia and was discharged home c higher dose of Nifedipine from 60 to 90 mg daily.    Discharge summary also states to start Coreg 3.125 bid; however, rx was not changed on med list and pt is still taking lopressor 50 bid.    Has not had recurrence of chest pain.  Denies sob or palpitations.    Past Medical History:  Past Medical History:   Diagnosis Date    (HFpEF) heart failure with preserved ejection fraction 9/25/2017    Atrial fibrillation     CAD (coronary artery disease)     Diverticulosis     Fever blister     Heart attack 2006    Hyperlipidemia     Hypertension     Immunodeficiency due to treatment with immunosuppressive medication     Keloid cicatrix     Metabolic bone disease     Pericarditis     S/P kidney transplant     Thrombocytopenia     Thyroid disease     Unspecified disorder of kidney and ureter     Stage IIICKD       Home Medications:  Prior to Admission medications    Medication Sig Start Date End Date Taking? Authorizing Provider   aspirin (ADULT ASPIRIN EC LOW STRENGTH) 81 MG EC tablet Take 1 tablet by mouth Daily. 6/11/12  Yes Historical Provider, MD   atorvastatin  (LIPITOR) 10 MG tablet Take 1 tablet (10 mg total) by mouth once daily. 2/26/19  Yes Emre Latif MD   calcium carbonate (OS-TRISH) 500 mg calcium (1,250 mg) tablet Two by mouth twice a day 2/26/19  Yes Emre Latif MD   doxazosin (CARDURA) 4 MG tablet Take 1 tablet (4 mg total) by mouth every 12 (twelve) hours. 2/26/19  Yes Emre Latif MD   fexofenadine (ALLEGRA) 60 MG tablet Take 1 tablet by mouth Twice daily as needed. 6/11/12  Yes Historical Provider, MD   furosemide (LASIX) 40 MG tablet TAKE 0.5 TABLETS (20 MG TOTAL) BY MOUTH 2 (TWO) TIMES DAILY. 5/24/19  Yes Emre Latif MD   gabapentin (NEURONTIN) 300 MG capsule 1 po q hs x 3 days, then 1 po bid x 3 days, then 1 po tid.  Patient taking differently: daily as needed. 1 po q hs x 3 days, then 1 po bid x 3 days, then 1 po tid. 1/16/18  Yes Adilia Fernandez PA-C   metoprolol tartrate (LOPRESSOR) 50 MG tablet Take 1 tablet (50 mg total) by mouth 2 (two) times daily. 2/26/19  Yes Emre Latif MD   multivitamin (THERAGRAN) per tablet Take 1 tablet by mouth Daily. 6/11/12  Yes Historical Provider, MD   mycophenolate (CELLCEPT) 250 mg Cap TAKE 2 CAPSULES BY MOUTH EVERY 12 HOURS 10/13/18  Yes Emre Latif MD   nicotine (NICODERM CQ) 21 mg/24 hr Place 1 patch onto the skin once daily. 6/27/19  Yes Emre Latif MD   NIFEdipine (PROCARDIA-XL) 90 MG (OSM) 24 hr tablet Take 1 tablet (90 mg total) by mouth once daily. 6/18/19 6/17/20 Yes Paula Emmanuel NP   paricalcitol (ZEMPLAR) 1 MCG capsule One po MWF 3/11/19  Yes Emre Latif MD   potassium chloride (KLOR-CON) 10 MEQ TbSR Take 1 tablet (10 mEq total) by mouth once daily. 2/26/19  Yes Connie Magdaleno MD   spironolactone (ALDACTONE) 25 MG tablet Take 1 tablet (25 mg total) by mouth once daily. 2/26/19  Yes Emre Latif MD   tacrolimus (PROGRAF) 1 MG Cap TAKE 4 CAPSULES BY MOUTH IN THE MORNING AND THEN TAKE 3 CAPSULES BY MOUTH IN THE EVENING 7/23/18  Yes  Emre Latif MD   triamcinolone acetonide 0.1% (KENALOG) 0.1 % cream AAA bid to rash on ankles 2/26/19  Yes Lyric Jovel MD   vitamin D 1000 units Tab Take 370 mg by mouth 2 (two) times daily. 2 tablets BID   Yes Historical Provider, MD   warfarin (COUMADIN) 5 MG tablet Take 1 tablet (5 mg total) by mouth Daily. Or as directed by [facility]mad[facility] Clinic 2/25/19  Yes Shagufta Blunt MD   acetaminophen (TYLENOL) 325 MG tablet Take 2 tablets (650 mg total) by mouth every 4 (four) hours as needed. 6/17/19   Paula Emmanuel NP   predniSONE (DELTASONE) 5 MG tablet Take 1 tablet (5 mg total) by mouth every morning. 2/26/19   Emre Latif MD   ranitidine (ZANTAC) 150 MG tablet Take 1 tablet (150 mg total) by mouth 2 (two) times daily. 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   Substance Use Topics    Alcohol use: No    Drug use: No        Review of Systems   Constitutional: Positive for unexpected weight change. Negative for chills, fatigue and fever.   Respiratory: Negative for cough, chest tightness and shortness of breath.    Cardiovascular: Negative for chest pain.   Gastrointestinal: Negative for abdominal pain and blood in stool.   Genitourinary: Negative for dysuria and frequency.         Health Maintenance:     Immunizations:   Influenza declined by pt, Risks and benefits were discussed with patient.  TDap is up to date 3/2018  Pneumovax 12/2016, Prevnar 13 2/2017, pvax repeat rec at  "65  Shingrix rec once back in stock, avoid zostavax     Cancer Screening:  Colonoscopy: is up to date. 9/2014, polyps, hyperplastic and tubular adenoma, rec f/u in 5 yrs, will schedule at f/u    Objective:   BP (!) 142/78   Pulse 66   Ht 6' 1" (1.854 m)   Wt 103.8 kg (228 lb 13.4 oz)   SpO2 99%   BMI 30.19 kg/m²        General: AAO x3, no apparent distress  CV: RRR, no m/r/g  Pulm: Lungs CTAB, no crackles, no wheezes  Abd: s/NT/ND +BS  Extremities: no c/c/e    Labs:     Lab Results   Component Value Date    HGBA1C 5.8 (H) 02/02/2019     Lab Results   Component Value Date    WBC 7.62 07/01/2019    HGB 12.7 (L) 07/01/2019    HCT 40.1 07/01/2019    MCV 82 07/01/2019     07/01/2019     Lab Results   Component Value Date    IRON 66 03/11/2019    TIBC 250 03/11/2019    FERRITIN 822 (H) 03/11/2019     CMP  Sodium   Date Value Ref Range Status   07/01/2019 142 136 - 145 mmol/L Final     Potassium   Date Value Ref Range Status   07/01/2019 3.8 3.5 - 5.1 mmol/L Final     Chloride   Date Value Ref Range Status   07/01/2019 108 95 - 110 mmol/L Final     CO2   Date Value Ref Range Status   07/01/2019 22 (L) 23 - 29 mmol/L Final     Glucose   Date Value Ref Range Status   07/01/2019 105 70 - 110 mg/dL Final     BUN, Bld   Date Value Ref Range Status   07/01/2019 26 (H) 8 - 23 mg/dL Final     Creatinine   Date Value Ref Range Status   07/01/2019 1.9 (H) 0.5 - 1.4 mg/dL Final     Calcium   Date Value Ref Range Status   07/01/2019 8.2 (L) 8.7 - 10.5 mg/dL Final     Total Protein   Date Value Ref Range Status   06/15/2019 6.1 6.0 - 8.4 g/dL Final     Albumin   Date Value Ref Range Status   07/01/2019 3.3 (L) 3.5 - 5.2 g/dL Final     Total Bilirubin   Date Value Ref Range Status   06/15/2019 0.3 0.1 - 1.0 mg/dL Final     Comment:     For infants and newborns, interpretation of results should be based  on gestational age, weight and in agreement with clinical  observations.  Premature Infant recommended reference " ranges:  Up to 24 hours.............<8.0 mg/dL  Up to 48 hours............<12.0 mg/dL  3-5 days..................<15.0 mg/dL  6-29 days.................<15.0 mg/dL       Alkaline Phosphatase   Date Value Ref Range Status   06/15/2019 68 55 - 135 U/L Final     AST   Date Value Ref Range Status   06/15/2019 21 10 - 40 U/L Final     ALT   Date Value Ref Range Status   06/15/2019 16 10 - 44 U/L Final     Anion Gap   Date Value Ref Range Status   07/01/2019 12 8 - 16 mmol/L Final     eGFR if    Date Value Ref Range Status   07/01/2019 42.1 (A) >60 mL/min/1.73 m^2 Final     eGFR if non    Date Value Ref Range Status   07/01/2019 36.4 (A) >60 mL/min/1.73 m^2 Final     Comment:     Calculation used to obtain the estimated glomerular filtration  rate (eGFR) is the CKD-EPI equation.           Lab Results   Component Value Date    LDLCALC 69.4 04/12/2019     Lab Results   Component Value Date    TSH 1.434 02/02/2019         Assessment/Plan     Ibrahima was seen today for follow-up.    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  Never started coreg as recommended while inpatient.  Has been on Metoprolol 50 bid.  Has f/u c Cards today to discuss which med they prefer him to be on.  Cont nifedipine as per Cards, also on aldactone and lasix.  Keep f/u c Cards today.    Paroxysmal atrial fibrillation  Long term (current) use of anticoagulants [V58.61]  Rate and rhythm controlled on current regimen.  On coumadin for stroke prevention managed by Cards/coumadin clinic.  Cont meds as per Cards.    Night sweats  Weight loss  Unclear etiology.  Now reports night sweats have significantly improved and wt has come up a little .  Will monitor.  W/u thus far has been neg.    H/O kidney transplant  Immunodeficiency due to  treatment with immunosuppressive medication  CKD (chronic kidney disease) stage 3, GFR 30-59 ml/min  Secondary renal hyperparathyroidism  Followed by transplant clinic, Cr stable on last check    Prediabetes  Lab Results   Component Value Date    HGBA1C 5.8 (H) 02/02/2019     Mild, Pt was counseled on the need to avoid intake of simple sugars and minimize carbohydrates.  Also recommended weight loss and daily exercise.    Anemia due to stage 3 chronic kidney disease  Stable, no issues    Mixed hyperlipidemia  Lab Results   Component Value Date    LDLCALC 69.4 04/12/2019     Well controlled on meds, no issues    Coronary artery disease involving native coronary artery of native heart without angina pectoris  As per HPI  Recent stress test neg  no issues, has f/u c Cards today.    HM as above  RTC in 3 mos for f/u    Connie Magdaleno MD  Department of Internal Medicine - [facility]  07/10/2019

## 2019-07-05 DIAGNOSIS — I13.0 HYPERTENSIVE HEART AND RENAL DISEASE WITH RENAL FAILURE, STAGE 1 THROUGH STAGE 4 OR UNSPECIFIED CHRONIC KIDNEY DISEASE, WITH HEART FAILURE: ICD-10-CM

## 2019-07-06 DIAGNOSIS — E87.6 HYPOKALEMIA: ICD-10-CM

## 2019-07-06 DIAGNOSIS — I13.0 HYPERTENSIVE HEART AND RENAL DISEASE WITH RENAL FAILURE, STAGE 1 THROUGH STAGE 4 OR UNSPECIFIED CHRONIC KIDNEY DISEASE, WITH HEART FAILURE: ICD-10-CM

## 2019-07-08 DIAGNOSIS — I50.30 (HFPEF) HEART FAILURE WITH PRESERVED EJECTION FRACTION: ICD-10-CM

## 2019-07-08 DIAGNOSIS — E78.2 MIXED HYPERLIPIDEMIA: ICD-10-CM

## 2019-07-08 DIAGNOSIS — Z72.0 TOBACCO USE: Primary | ICD-10-CM

## 2019-07-08 DIAGNOSIS — I48.91 ATRIAL FIBRILLATION WITH RAPID VENTRICULAR RESPONSE: ICD-10-CM

## 2019-07-08 DIAGNOSIS — I13.0 HYPERTENSIVE HEART AND RENAL DISEASE WITH RENAL FAILURE, STAGE 1 THROUGH STAGE 4 OR UNSPECIFIED CHRONIC KIDNEY DISEASE, WITH HEART FAILURE: ICD-10-CM

## 2019-07-08 DIAGNOSIS — Z79.01 LONG TERM CURRENT USE OF ANTICOAGULANT THERAPY: ICD-10-CM

## 2019-07-08 RX ORDER — WARFARIN SODIUM 5 MG/1
5 TABLET ORAL DAILY
Qty: 30 TABLET | Refills: 5 | Status: SHIPPED | OUTPATIENT
Start: 2019-07-08 | End: 2020-01-21 | Stop reason: SDUPTHER

## 2019-07-08 RX ORDER — PARICALCITOL 1 UG/1
CAPSULE, LIQUID FILLED ORAL
Qty: 90 CAPSULE | Refills: 3 | Status: SHIPPED | OUTPATIENT
Start: 2019-07-08 | End: 2020-01-20 | Stop reason: SDUPTHER

## 2019-07-08 RX ORDER — IBUPROFEN 200 MG
1 TABLET ORAL DAILY
Qty: 28 PATCH | Refills: 4 | Status: SHIPPED | OUTPATIENT
Start: 2019-07-08 | End: 2022-02-08 | Stop reason: SDUPTHER

## 2019-07-08 RX ORDER — POTASSIUM CHLORIDE 750 MG/1
10 TABLET, EXTENDED RELEASE ORAL DAILY
Qty: 90 TABLET | Refills: 3 | Status: SHIPPED | OUTPATIENT
Start: 2019-07-08 | End: 2020-01-20 | Stop reason: SDUPTHER

## 2019-07-08 RX ORDER — NIFEDIPINE 90 MG/1
90 TABLET, EXTENDED RELEASE ORAL DAILY
Qty: 90 TABLET | Refills: 3 | Status: SHIPPED | OUTPATIENT
Start: 2019-07-08 | End: 2020-01-20 | Stop reason: SDUPTHER

## 2019-07-08 RX ORDER — CARVEDILOL 12.5 MG/1
12.5 TABLET ORAL 2 TIMES DAILY WITH MEALS
Qty: 180 TABLET | Refills: 3 | Status: SHIPPED | OUTPATIENT
Start: 2019-07-08 | End: 2019-10-01 | Stop reason: SDUPTHER

## 2019-07-08 RX ORDER — FUROSEMIDE 40 MG/1
20 TABLET ORAL 2 TIMES DAILY
Qty: 180 TABLET | Refills: 3 | Status: SHIPPED | OUTPATIENT
Start: 2019-07-08 | End: 2020-01-20 | Stop reason: SDUPTHER

## 2019-07-08 RX ORDER — NIFEDIPINE 90 MG/1
90 TABLET, EXTENDED RELEASE ORAL DAILY
Qty: 90 TABLET | Refills: 3 | Status: SHIPPED | OUTPATIENT
Start: 2019-07-08 | End: 2019-07-08

## 2019-07-08 RX ORDER — PREDNISONE 5 MG/1
5 TABLET ORAL EVERY MORNING
Qty: 90 TABLET | Refills: 3 | Status: SHIPPED | OUTPATIENT
Start: 2019-07-08 | End: 2020-01-20 | Stop reason: SDUPTHER

## 2019-07-08 RX ORDER — DOXAZOSIN 4 MG/1
4 TABLET ORAL EVERY 12 HOURS
Qty: 270 TABLET | Refills: 3 | Status: SHIPPED | OUTPATIENT
Start: 2019-07-08 | End: 2020-01-20 | Stop reason: SDUPTHER

## 2019-07-08 RX ORDER — WARFARIN SODIUM 5 MG/1
5 TABLET ORAL DAILY
Qty: 30 TABLET | Refills: 5 | Status: CANCELLED | OUTPATIENT
Start: 2019-07-08

## 2019-07-08 RX ORDER — SPIRONOLACTONE 25 MG/1
25 TABLET ORAL DAILY
Qty: 90 TABLET | Refills: 3 | Status: SHIPPED | OUTPATIENT
Start: 2019-07-08 | End: 2020-01-20 | Stop reason: SDUPTHER

## 2019-07-08 RX ORDER — ATORVASTATIN CALCIUM 10 MG/1
10 TABLET, FILM COATED ORAL DAILY
Qty: 90 TABLET | Refills: 3 | Status: SHIPPED | OUTPATIENT
Start: 2019-07-08 | End: 2020-01-20 | Stop reason: SDUPTHER

## 2019-07-08 NOTE — PROGRESS NOTES
Ibrahima Phelps would like a refill of the following medications:         warfarin (COUMADIN) 5 MG tablet [Shagufta Blunt MD]     Preferred pharmacy: Other - [facility]   Delivery method: Pickup

## 2019-07-10 VITALS
WEIGHT: 228.81 LBS | HEIGHT: 73 IN | DIASTOLIC BLOOD PRESSURE: 78 MMHG | OXYGEN SATURATION: 99 % | BODY MASS INDEX: 30.33 KG/M2 | HEART RATE: 66 BPM | SYSTOLIC BLOOD PRESSURE: 142 MMHG

## 2019-07-19 ENCOUNTER — OFFICE VISIT (OUTPATIENT)
Dept: NEPHROLOGY | Facility: CLINIC | Age: 64
End: 2019-07-19
Payer: COMMERCIAL

## 2019-07-19 ENCOUNTER — ANTI-COAG VISIT (OUTPATIENT)
Dept: CARDIOLOGY | Facility: CLINIC | Age: 64
End: 2019-07-19
Payer: COMMERCIAL

## 2019-07-19 VITALS
OXYGEN SATURATION: 99 % | WEIGHT: 231.69 LBS | HEIGHT: 73 IN | BODY MASS INDEX: 30.71 KG/M2 | SYSTOLIC BLOOD PRESSURE: 144 MMHG | HEART RATE: 57 BPM | DIASTOLIC BLOOD PRESSURE: 68 MMHG

## 2019-07-19 DIAGNOSIS — N25.81 SECONDARY RENAL HYPERPARATHYROIDISM: ICD-10-CM

## 2019-07-19 DIAGNOSIS — E11.9 TYPE 2 DIABETES MELLITUS WITHOUT COMPLICATION, WITHOUT LONG-TERM CURRENT USE OF INSULIN: ICD-10-CM

## 2019-07-19 DIAGNOSIS — I50.30 (HFPEF) HEART FAILURE WITH PRESERVED EJECTION FRACTION: ICD-10-CM

## 2019-07-19 DIAGNOSIS — E78.2 MIXED HYPERLIPIDEMIA: ICD-10-CM

## 2019-07-19 DIAGNOSIS — N18.30 CKD (CHRONIC KIDNEY DISEASE) STAGE 3, GFR 30-59 ML/MIN: Primary | ICD-10-CM

## 2019-07-19 DIAGNOSIS — I48.91 ATRIAL FIBRILLATION WITH RAPID VENTRICULAR RESPONSE: ICD-10-CM

## 2019-07-19 DIAGNOSIS — I13.0 HYPERTENSIVE HEART AND RENAL DISEASE WITH RENAL FAILURE, STAGE 1 THROUGH STAGE 4 OR UNSPECIFIED CHRONIC KIDNEY DISEASE, WITH HEART FAILURE: ICD-10-CM

## 2019-07-19 DIAGNOSIS — I25.10 CORONARY ARTERY DISEASE INVOLVING NATIVE CORONARY ARTERY OF NATIVE HEART WITHOUT ANGINA PECTORIS: ICD-10-CM

## 2019-07-19 DIAGNOSIS — Z72.0 TOBACCO USE: ICD-10-CM

## 2019-07-19 DIAGNOSIS — Z94.0 H/O KIDNEY TRANSPLANT: ICD-10-CM

## 2019-07-19 DIAGNOSIS — Z94.0 KIDNEY TRANSPLANT RECIPIENT: ICD-10-CM

## 2019-07-19 DIAGNOSIS — D63.1 ANEMIA DUE TO STAGE 3 CHRONIC KIDNEY DISEASE: ICD-10-CM

## 2019-07-19 DIAGNOSIS — Z86.79 HISTORY OF PERICARDITIS: ICD-10-CM

## 2019-07-19 DIAGNOSIS — I48.0 PAROXYSMAL ATRIAL FIBRILLATION: ICD-10-CM

## 2019-07-19 DIAGNOSIS — Z79.01 LONG TERM (CURRENT) USE OF ANTICOAGULANTS: Primary | ICD-10-CM

## 2019-07-19 DIAGNOSIS — N18.30 ANEMIA DUE TO STAGE 3 CHRONIC KIDNEY DISEASE: ICD-10-CM

## 2019-07-19 LAB — INR PPP: 2.8 (ref 2–3)

## 2019-07-19 PROCEDURE — 85610 PROTHROMBIN TIME: CPT | Mod: QW,S$GLB,, | Performed by: INTERNAL MEDICINE

## 2019-07-19 PROCEDURE — 3077F PR MOST RECENT SYSTOLIC BLOOD PRESSURE >= 140 MM HG: ICD-10-PCS | Mod: CPTII,S$GLB,, | Performed by: INTERNAL MEDICINE

## 2019-07-19 PROCEDURE — 3044F PR MOST RECENT HEMOGLOBIN A1C LEVEL <7.0%: ICD-10-PCS | Mod: CPTII,S$GLB,, | Performed by: INTERNAL MEDICINE

## 2019-07-19 PROCEDURE — 3077F SYST BP >= 140 MM HG: CPT | Mod: CPTII,S$GLB,, | Performed by: INTERNAL MEDICINE

## 2019-07-19 PROCEDURE — 99999 PR PBB SHADOW E&M-EST. PATIENT-LVL IV: CPT | Mod: PBBFAC,,, | Performed by: INTERNAL MEDICINE

## 2019-07-19 PROCEDURE — 3044F HG A1C LEVEL LT 7.0%: CPT | Mod: CPTII,S$GLB,, | Performed by: INTERNAL MEDICINE

## 2019-07-19 PROCEDURE — 99214 OFFICE O/P EST MOD 30 MIN: CPT | Mod: S$GLB,,, | Performed by: INTERNAL MEDICINE

## 2019-07-19 PROCEDURE — 3078F DIAST BP <80 MM HG: CPT | Mod: CPTII,S$GLB,, | Performed by: INTERNAL MEDICINE

## 2019-07-19 PROCEDURE — 85610 POCT INR: ICD-10-PCS | Mod: QW,S$GLB,, | Performed by: INTERNAL MEDICINE

## 2019-07-19 PROCEDURE — 93793 PR ANTICOAGULANT MGMT FOR PT TAKING WARFARIN: ICD-10-PCS | Mod: S$GLB,,,

## 2019-07-19 PROCEDURE — 93793 ANTICOAG MGMT PT WARFARIN: CPT | Mod: S$GLB,,,

## 2019-07-19 PROCEDURE — 99999 PR PBB SHADOW E&M-EST. PATIENT-LVL IV: ICD-10-PCS | Mod: PBBFAC,,, | Performed by: INTERNAL MEDICINE

## 2019-07-19 PROCEDURE — 3078F PR MOST RECENT DIASTOLIC BLOOD PRESSURE < 80 MM HG: ICD-10-PCS | Mod: CPTII,S$GLB,, | Performed by: INTERNAL MEDICINE

## 2019-07-19 PROCEDURE — 3008F BODY MASS INDEX DOCD: CPT | Mod: CPTII,S$GLB,, | Performed by: INTERNAL MEDICINE

## 2019-07-19 PROCEDURE — 99214 PR OFFICE/OUTPT VISIT, EST, LEVL IV, 30-39 MIN: ICD-10-PCS | Mod: S$GLB,,, | Performed by: INTERNAL MEDICINE

## 2019-07-19 PROCEDURE — 3008F PR BODY MASS INDEX (BMI) DOCUMENTED: ICD-10-PCS | Mod: CPTII,S$GLB,, | Performed by: INTERNAL MEDICINE

## 2019-07-19 NOTE — PROGRESS NOTES
Subjective:       Patient ID: Ibrahima Phelps is a 63 y.o. Black or  male who presents for follow-up evaluation of Chronic Kidney Disease and Kidney Transplant    HPI     He is getting over an URI, has not 'gone to his chest' but now feeling better. He continues to smoke a few cigarettes a day. Off PPI for several months now and without dyspepsia. No LE edema and no SOB.  He's noticed some allograft 'twinges' but no overt pain but no problems with urination. His wife is still working at [employer] as a manager of housing for [employer].     Interval history March 2019: he has three concerns:  1. Worsening tremors. Spilling corn and trouble with writing  2. Increased nocturia  3. Cold intolerance    He denies recent illness. No allogarft pain and no LUTS other than nocturia. He has unintentionally lost weight. No new medications. He is doing well off PPI. He is smoking 3 cigarettes to 3/4ppd depending on the day.     Interval history July 2019: he was hospitalized for CP and was found to be in SVT, his potassium was low in ER. He was CP free in ER after X 1 SL NTG and after SVT broke. Since then he feels well. He has lost a little bit of weight. Still smoking     Review of Systems   Constitutional: Positive for unexpected weight change. Negative for activity change, appetite change, fatigue and fever.   HENT: Negative for congestion, facial swelling, postnasal drip and rhinorrhea.    Respiratory: Negative for cough and shortness of breath.    Cardiovascular: Negative for chest pain and leg swelling.   Gastrointestinal: Negative for abdominal pain (allograft 'twinges').   Endocrine: Positive for cold intolerance.   Genitourinary: Positive for frequency. Negative for decreased urine volume, difficulty urinating, dysuria and hematuria.   Musculoskeletal: Positive for arthralgias and back pain.   Skin: Negative for rash.   Neurological: Positive for tremors. Negative for weakness and headaches.   Hematological:  Does not bruise/bleed easily.   Psychiatric/Behavioral: Negative for decreased concentration and sleep disturbance.       Objective:      Physical Exam   Constitutional: He is oriented to person, place, and time. He appears well-developed and well-nourished. No distress.   HENT:   Mouth/Throat: Oropharynx is clear and moist.   Eyes: No scleral icterus.   Neck: No JVD present.   Cardiovascular: Normal rate, S1 normal and S2 normal. Exam reveals no friction rub.   Pulmonary/Chest: Effort normal. He has no wheezes. He has no rales.   Abdominal: Soft. There is no tenderness.   Musculoskeletal: He exhibits no edema.   Neurological: He is alert and oriented to person, place, and time.   Skin: Skin is warm and dry.   Psychiatric: He has a normal mood and affect.   Nursing note and vitals reviewed.      Assessment:       1. CKD (chronic kidney disease) stage 3, GFR 30-59 ml/min    2. Type 2 diabetes mellitus without complication, without long-term current use of insulin    3. Kidney transplant recipient    4. H/O kidney transplant    5. 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 Paroxysmal atrial fibrillation    7. Mixed hyperlipidemia    8. History of pericarditis    9. (HFpEF) heart failure with preserved ejection fraction    10. Coronary artery disease involving native coronary artery of native heart without angina pectoris    11. Secondary renal hyperparathyroidism    12. Anemia due to stage 3 chronic kidney disease    13. Tobacco use        Plan:             ESRD s/p kidney transplsant with resultant CKD. Stable allograft function. Repeat tac level    HTN is controlled    PreDiabetes--stable    Mineral and Bone Disease--continue current treatment with calcium tablets, Zemplar and D3    Back pain--no NSAIDs, OK for gabapentin    Tobacco use--counseled, again      RTC 4 months with labs prior

## 2019-07-26 ENCOUNTER — OFFICE VISIT (OUTPATIENT)
Dept: URGENT CARE | Facility: CLINIC | Age: 64
End: 2019-07-26
Payer: COMMERCIAL

## 2019-07-26 VITALS
TEMPERATURE: 99 F | DIASTOLIC BLOOD PRESSURE: 77 MMHG | BODY MASS INDEX: 30.09 KG/M2 | SYSTOLIC BLOOD PRESSURE: 152 MMHG | OXYGEN SATURATION: 98 % | HEIGHT: 73 IN | RESPIRATION RATE: 19 BRPM | HEART RATE: 64 BPM | WEIGHT: 227 LBS

## 2019-07-26 DIAGNOSIS — J02.9 SORE THROAT: ICD-10-CM

## 2019-07-26 DIAGNOSIS — J06.9 UPPER RESPIRATORY VIRUS: Primary | ICD-10-CM

## 2019-07-26 DIAGNOSIS — H61.23 BILATERAL IMPACTED CERUMEN: ICD-10-CM

## 2019-07-26 LAB
CTP QC/QA: YES
S PYO RRNA THROAT QL PROBE: NEGATIVE

## 2019-07-26 PROCEDURE — 3008F PR BODY MASS INDEX (BMI) DOCUMENTED: ICD-10-PCS | Mod: CPTII,S$GLB,, | Performed by: PHYSICIAN ASSISTANT

## 2019-07-26 PROCEDURE — 87880 STREP A ASSAY W/OPTIC: CPT | Mod: QW,S$GLB,, | Performed by: PHYSICIAN ASSISTANT

## 2019-07-26 PROCEDURE — 3078F DIAST BP <80 MM HG: CPT | Mod: CPTII,S$GLB,, | Performed by: PHYSICIAN ASSISTANT

## 2019-07-26 PROCEDURE — 3077F PR MOST RECENT SYSTOLIC BLOOD PRESSURE >= 140 MM HG: ICD-10-PCS | Mod: CPTII,S$GLB,, | Performed by: PHYSICIAN ASSISTANT

## 2019-07-26 PROCEDURE — 87880 POCT RAPID STREP A: ICD-10-PCS | Mod: QW,S$GLB,, | Performed by: PHYSICIAN ASSISTANT

## 2019-07-26 PROCEDURE — 99214 PR OFFICE/OUTPT VISIT, EST, LEVL IV, 30-39 MIN: ICD-10-PCS | Mod: S$GLB,,, | Performed by: PHYSICIAN ASSISTANT

## 2019-07-26 PROCEDURE — 3078F PR MOST RECENT DIASTOLIC BLOOD PRESSURE < 80 MM HG: ICD-10-PCS | Mod: CPTII,S$GLB,, | Performed by: PHYSICIAN ASSISTANT

## 2019-07-26 PROCEDURE — 3008F BODY MASS INDEX DOCD: CPT | Mod: CPTII,S$GLB,, | Performed by: PHYSICIAN ASSISTANT

## 2019-07-26 PROCEDURE — 99214 OFFICE O/P EST MOD 30 MIN: CPT | Mod: S$GLB,,, | Performed by: PHYSICIAN ASSISTANT

## 2019-07-26 PROCEDURE — 3077F SYST BP >= 140 MM HG: CPT | Mod: CPTII,S$GLB,, | Performed by: PHYSICIAN ASSISTANT

## 2019-07-26 RX ORDER — FLUTICASONE PROPIONATE 50 MCG
1 SPRAY, SUSPENSION (ML) NASAL DAILY
Qty: 18.2 ML | Refills: 0 | Status: ON HOLD | OUTPATIENT
Start: 2019-07-26 | End: 2023-10-20 | Stop reason: SDUPTHER

## 2019-07-26 RX ORDER — LORATADINE 10 MG/1
10 TABLET ORAL DAILY
Qty: 30 TABLET | Refills: 0 | Status: SHIPPED | OUTPATIENT
Start: 2019-07-26 | End: 2019-10-01 | Stop reason: CLARIF

## 2019-07-26 NOTE — PATIENT INSTRUCTIONS
PLEASE READ YOUR DISCHARGE INSTRUCTIONS ENTIRELY AS IT CONTAINS IMPORTANT INFORMATION.  - Rest.    - Drink plenty of fluids.    - Tylenol or Ibuprofen as directed as needed for fever/pain.    - Follow up with your PCP or specialty clinic as directed in the next 1-2 weeks if not improved or as needed.  You can call [phone] to schedule an appointment with the appropriate provider.    - If you were prescribed antibiotics, please take them to completion.  - If you were prescribed a narcotic medication, do not drive or operate heavy equipment or machinery while taking these medications.  - If you  smoke, please stop smoking.  -You must understand that you've received an Urgent Care treatment only and that you may be released before all your medical problems are known or treated. You, the patient, will    arrange for follow up care as instructed.  - Please return to Urgent Care or to the Emergency Department if your symptoms worsen.    Patient aware and verbalized understanding.    Earwax (Treated)    Everyone produces earwax from the lining of the ear canal. It lubricates and protects the ear. The wax that forms in the canal slowly moves toward the outside of the ear and falls out. Sometimes wax can build up in the ear canal. This can cause a blockage and loss of hearing. A buildup of earwax was removed from your ear today.  Home care  If you have a tendency to build up wax in the ear canal, you should clear the wax at home regularly, before it causes discomfort. This should be about once every six months.  · Unless a medicine was prescribed, you may use an over-the-counter product made for clearing earwax. These contain carbamide peroxide and are available over-the-counter in a kit with a small bulb syringe.  · Lie down with the blocked ear facing upward. Apply one dropper full of medicine and wait a few minutes. Grasp the outer ear and wiggle it to help the solution enter the canal.  · Lean over a sink or basin  with the blocked ear turned downward. Use a rubber bulb syringe filled with warm (not hot or cold) water to rinse the ear several times. Use gentle pressure only. You may need to repeat the irrigation several times before the wax flows out.  · If you are having trouble draining all the water out of your ear canal, put a few drops of rubbing alcohol into the ear canal. This will help remove the remaining water.  Don'ts  · Dont use cold water to rinse the ear. This will make you dizzy.  · Dont do this procedure if you have an ear infection. Symptoms include ear pain, fever, or fluid draining from the ear.  · Dont do this procedure if you have a punctured eardrum.  · Dont use cotton swabs, matches, hairpins, keys, or other objects to clean the ear canal. This can cause infection of the ear canal or rupture of the eardrum. Because of their size and shape, cotton swabs can push the earwax deeper into the ear canal instead of removing it.  Follow-up care  Follow up with your healthcare provider, or as advised.  When to seek medical advice  Call your healthcare provider right away if any of these occur:  · Worsening ear pain  · Fever of 100.4°F (38°C) or higher, or as directed by your healthcare provider  · Hearing does not return to normal after three days of treatment  · Fluid drainage or bleeding from the ear canal  · Swelling, redness, or tenderness of the outer ear  · Headache, neck pain, or stiff neck  Date Last Reviewed: 3/22/2015  © 8036-0069 [employer]. [address] [place] [address], [place] [address]. All rights reserved. This information is not intended as a substitute for professional medical care. Always follow your healthcare professional's instructions.        Viral Upper Respiratory Illness (Adult)  You have a viral upper respiratory illness (URI), which is another term for the common cold. This illness is contagious during the first few days. It is spread through the air by coughing and  sneezing. It may also be spread by direct contact (touching the sick person and then touching your own eyes, nose, or mouth). Frequent handwashing will decrease risk of spread. Most viral illnesses go away within 7 to 10 days with rest and simple home remedies. Sometimes the illness may last for several weeks. Antibiotics will not kill a virus, and they are generally not prescribed for this condition.    Home care  · If symptoms are severe, rest at home for the first 2 to 3 days. When you resume activity, don't let yourself get too tired.  · Avoid being exposed to cigarette smoke (yours or others).  · You may use acetaminophen or ibuprofen to control pain and fever, unless another medicine was prescribed. (Note: If you have chronic liver or kidney disease, have ever had a stomach ulcer or gastrointestinal bleeding, or are taking blood-thinning medicines, talk with your healthcare provider before using these medicines.) Aspirin should never be given to anyone under 18 years of age who is ill with a viral infection or fever. It may cause severe liver or brain damage.  · Your appetite may be poor, so a light diet is fine. Avoid dehydration by drinking 6 to 8 glasses of fluids per day (water, soft drinks, juices, tea, or soup). Extra fluids will help loosen secretions in the nose and lungs.  · Over-the-counter cold medicines will not shorten the length of time youre sick, but they may be helpful for the following symptoms: cough, sore throat, and nasal and sinus congestion. (Note: Do not use decongestants if you have high blood pressure.)  Follow-up care  Follow up with your healthcare provider, or as advised.  When to seek medical advice  Call your healthcare provider right away if any of these occur:  · Cough with lots of colored sputum (mucus)  · Severe headache; face, neck, or ear pain  · Difficulty swallowing due to throat pain  · Fever of 100.4°F (38°C)  Call 911, or get immediate medical care  Call emergency  services right away if any of these occur:  · Chest pain, shortness of breath, wheezing, or difficulty breathing  · Coughing up blood  · Inability to swallow due to throat pain  Date Last Reviewed: 9/13/2015  © 5923-4865 [employer]. [address] [place] [address], [place] [address]. All rights reserved. This information is not intended as a substitute for professional medical care. Always follow your healthcare professional's instructions.

## 2019-07-26 NOTE — PROGRESS NOTES
"Subjective:       Patient ID: Ibrahima Phelps is a 63 y.o. male.    Vitals:  height is 6' 1" (1.854 m) and weight is 103 kg (227 lb). His oral temperature is 98.7 °F (37.1 °C). His blood pressure is 152/77 (abnormal) and his pulse is 64. His respiration is 19 and oxygen saturation is 98%.     Chief Complaint: Sore Throat    Mr. Phelps presents for evaluation of 2 days of sore throat, sneezing, PND, cough, congestion, and ear fullness.  He denies any ear pain, fevers, chills, N/V.  He did try debrox for the ear fullness.   He also tried chloraseptic spray for the sore throat.      URI    This is a new problem. The current episode started in the past 7 days (2 days). The problem has been gradually worsening. There has been no fever. Associated symptoms include congestion, sinus pain, sneezing and a sore throat. Pertinent negatives include no abdominal pain, chest pain, coughing, diarrhea, dysuria, ear pain, headaches, joint pain, joint swelling, nausea, neck pain, plugged ear sensation, rash, rhinorrhea, swollen glands, vomiting or wheezing. Associated symptoms comments: Trouble swallowing. Treatments tried: Chloraseptic spray. The treatment provided mild relief.       Constitution: Negative for chills, fatigue and fever.   HENT: Positive for congestion, sinus pain and sore throat. Negative for ear pain.    Neck: Negative for neck pain and painful lymph nodes.   Cardiovascular: Negative for chest pain and leg swelling.   Eyes: Negative for double vision and blurred vision.   Respiratory: Negative for cough, shortness of breath and wheezing.    Gastrointestinal: Negative for abdominal pain, nausea, vomiting and diarrhea.   Genitourinary: Negative for dysuria, frequency and urgency.   Musculoskeletal: Negative for joint pain, joint swelling, muscle cramps and muscle ache.   Skin: Negative for color change, pale and rash.   Allergic/Immunologic: Positive for sneezing. Negative for seasonal allergies.   Neurological: " Negative for dizziness, history of vertigo, light-headedness, passing out and headaches.   Hematologic/Lymphatic: Negative for swollen lymph nodes, easy bruising/bleeding and history of blood clots. Does not bruise/bleed easily.   Psychiatric/Behavioral: Negative for nervous/anxious, sleep disturbance and depression. The patient is not nervous/anxious.        Objective:      Physical Exam   Constitutional: He is oriented to person, place, and time. He appears well-developed and well-nourished. He is cooperative.  Non-toxic appearance. He does not appear ill. No distress.   HENT:   Head: Normocephalic and atraumatic.   Nose: Mucosal edema and rhinorrhea present. No nasal deformity. No epistaxis. Right sinus exhibits no maxillary sinus tenderness and no frontal sinus tenderness. Left sinus exhibits no maxillary sinus tenderness and no frontal sinus tenderness.   Mouth/Throat: Uvula is midline and mucous membranes are normal. No trismus in the jaw. Normal dentition. No uvula swelling. Posterior oropharyngeal erythema present. No tonsillar abscesses. No tonsillar exudate.   Bilateral cerumen impaction.    Eyes: Conjunctivae and lids are normal. No scleral icterus.   Sclera clear bilat   Neck: Trachea normal, full passive range of motion without pain and phonation normal. Neck supple.   Cardiovascular: Normal rate, regular rhythm, normal heart sounds, intact distal pulses and normal pulses.   Pulmonary/Chest: Effort normal and breath sounds normal. No respiratory distress. He has no decreased breath sounds. He has no wheezes. He has no rhonchi. He has no rales.   Abdominal: Soft. Normal appearance and bowel sounds are normal. He exhibits no distension. There is no tenderness.   Musculoskeletal: Normal range of motion. He exhibits no edema or deformity.   Lymphadenopathy:     He has no cervical adenopathy.   Neurological: He is alert and oriented to person, place, and time. He exhibits normal muscle tone. Coordination  normal.   Skin: Skin is warm, dry and intact. He is not diaphoretic. No pallor.   Psychiatric: He has a normal mood and affect. His speech is normal and behavior is normal. Judgment and thought content normal. Cognition and memory are normal.   Nursing note and vitals reviewed.      After cerumen disimpaction, right ear remained impacted after multiple attempts, unable to visualize TM.  Left TM intact without erythema or bulging. +effusion.    Results for orders placed or performed in visit on 07/26/19   POCT rapid strep A   Result Value Ref Range    Rapid Strep A Screen Negative Negative     Acceptable Yes        Assessment:       1. Upper respiratory virus    2. Sore throat    3. Bilateral impacted cerumen        Plan:         Upper respiratory virus    Sore throat  -     POCT rapid strep A    Bilateral impacted cerumen  -     Ear wax removal  -     Ambulatory referral to ENT    Other orders  -     loratadine (CLARITIN) 10 mg tablet; Take 1 tablet (10 mg total) by mouth once daily.  Dispense: 30 tablet; Refill: 0  -     fluticasone propionate (FLONASE) 50 mcg/actuation nasal spray; 1 spray (50 mcg total) by Each Nostril route once daily.  Dispense: 18.2 mL; Refill: 0    Will refer to ENT for cerumen removal.   He is not currently taking Allegra, will try claritin.     Patient Instructions   PLEASE READ YOUR DISCHARGE INSTRUCTIONS ENTIRELY AS IT CONTAINS IMPORTANT INFORMATION.  - Rest.    - Drink plenty of fluids.    - Tylenol or Ibuprofen as directed as needed for fever/pain.    - Follow up with your PCP or specialty clinic as directed in the next 1-2 weeks if not improved or as needed.  You can call [phone] to schedule an appointment with the appropriate provider.    - If you were prescribed antibiotics, please take them to completion.  - If you were prescribed a narcotic medication, do not drive or operate heavy equipment or machinery while taking these medications.  - If you  smoke, please  stop smoking.  -You must understand that you've received an Urgent Care treatment only and that you may be released before all your medical problems are known or treated. You, the patient, will    arrange for follow up care as instructed.  - Please return to Urgent Care or to the Emergency Department if your symptoms worsen.    Patient aware and verbalized understanding.    Earwax (Treated)    Everyone produces earwax from the lining of the ear canal. It lubricates and protects the ear. The wax that forms in the canal slowly moves toward the outside of the ear and falls out. Sometimes wax can build up in the ear canal. This can cause a blockage and loss of hearing. A buildup of earwax was removed from your ear today.  Home care  If you have a tendency to build up wax in the ear canal, you should clear the wax at home regularly, before it causes discomfort. This should be about once every six months.  · Unless a medicine was prescribed, you may use an over-the-counter product made for clearing earwax. These contain carbamide peroxide and are available over-the-counter in a kit with a small bulb syringe.  · Lie down with the blocked ear facing upward. Apply one dropper full of medicine and wait a few minutes. Grasp the outer ear and wiggle it to help the solution enter the canal.  · Lean over a sink or basin with the blocked ear turned downward. Use a rubber bulb syringe filled with warm (not hot or cold) water to rinse the ear several times. Use gentle pressure only. You may need to repeat the irrigation several times before the wax flows out.  · If you are having trouble draining all the water out of your ear canal, put a few drops of rubbing alcohol into the ear canal. This will help remove the remaining water.  Don'ts  · Dont use cold water to rinse the ear. This will make you dizzy.  · Dont do this procedure if you have an ear infection. Symptoms include ear pain, fever, or fluid draining from the ear.  · Dont  do this procedure if you have a punctured eardrum.  · Dont use cotton swabs, matches, hairpins, keys, or other objects to clean the ear canal. This can cause infection of the ear canal or rupture of the eardrum. Because of their size and shape, cotton swabs can push the earwax deeper into the ear canal instead of removing it.  Follow-up care  Follow up with your healthcare provider, or as advised.  When to seek medical advice  Call your healthcare provider right away if any of these occur:  · Worsening ear pain  · Fever of 100.4°F (38°C) or higher, or as directed by your healthcare provider  · Hearing does not return to normal after three days of treatment  · Fluid drainage or bleeding from the ear canal  · Swelling, redness, or tenderness of the outer ear  · Headache, neck pain, or stiff neck  Date Last Reviewed: 3/22/2015  © 3232-3393 [employer]. [address] [place] [address]. All rights reserved. This information is not intended as a substitute for professional medical care. Always follow your healthcare professional's instructions.        Viral Upper Respiratory Illness (Adult)  You have a viral upper respiratory illness (URI), which is another term for the common cold. This illness is contagious during the first few days. It is spread through the air by coughing and sneezing. It may also be spread by direct contact (touching the sick person and then touching your own eyes, nose, or mouth). Frequent handwashing will decrease risk of spread. Most viral illnesses go away within 7 to 10 days with rest and simple home remedies. Sometimes the illness may last for several weeks. Antibiotics will not kill a virus, and they are generally not prescribed for this condition.    Home care  · If symptoms are severe, rest at home for the first 2 to 3 days. When you resume activity, don't let yourself get too tired.  · Avoid being exposed to cigarette smoke (yours or others).  · You may use  acetaminophen or ibuprofen to control pain and fever, unless another medicine was prescribed. (Note: If you have chronic liver or kidney disease, have ever had a stomach ulcer or gastrointestinal bleeding, or are taking blood-thinning medicines, talk with your healthcare provider before using these medicines.) Aspirin should never be given to anyone under 18 years of age who is ill with a viral infection or fever. It may cause severe liver or brain damage.  · Your appetite may be poor, so a light diet is fine. Avoid dehydration by drinking 6 to 8 glasses of fluids per day (water, soft drinks, juices, tea, or soup). Extra fluids will help loosen secretions in the nose and lungs.  · Over-the-counter cold medicines will not shorten the length of time youre sick, but they may be helpful for the following symptoms: cough, sore throat, and nasal and sinus congestion. (Note: Do not use decongestants if you have high blood pressure.)  Follow-up care  Follow up with your healthcare provider, or as advised.  When to seek medical advice  Call your healthcare provider right away if any of these occur:  · Cough with lots of colored sputum (mucus)  · Severe headache; face, neck, or ear pain  · Difficulty swallowing due to throat pain  · Fever of 100.4°F (38°C)  Call 911, or get immediate medical care  Call emergency services right away if any of these occur:  · Chest pain, shortness of breath, wheezing, or difficulty breathing  · Coughing up blood  · Inability to swallow due to throat pain  Date Last Reviewed: 9/13/2015  © 2479-1621 [employer]. [address] [place] [address], [place] [address]. All rights reserved. This information is not intended as a substitute for professional medical care. Always follow your healthcare professional's instructions.

## 2019-08-07 RX ORDER — TACROLIMUS 1 MG/1
CAPSULE ORAL
Qty: 210 CAPSULE | Refills: 6 | Status: SHIPPED | OUTPATIENT
Start: 2019-08-07 | End: 2019-11-27 | Stop reason: SDUPTHER

## 2019-08-09 NOTE — PROGRESS NOTES
Patient called to reschedule today's missed coumadin clinic appointment, it was rescheduled to 8/13/19

## 2019-08-13 ENCOUNTER — OFFICE VISIT (OUTPATIENT)
Dept: OTOLARYNGOLOGY | Facility: CLINIC | Age: 64
End: 2019-08-13
Payer: COMMERCIAL

## 2019-08-13 ENCOUNTER — ANTI-COAG VISIT (OUTPATIENT)
Dept: CARDIOLOGY | Facility: CLINIC | Age: 64
End: 2019-08-13
Payer: COMMERCIAL

## 2019-08-13 VITALS
DIASTOLIC BLOOD PRESSURE: 78 MMHG | BODY MASS INDEX: 30.21 KG/M2 | HEART RATE: 59 BPM | WEIGHT: 229 LBS | SYSTOLIC BLOOD PRESSURE: 155 MMHG

## 2019-08-13 DIAGNOSIS — I48.91 ATRIAL FIBRILLATION WITH RAPID VENTRICULAR RESPONSE: ICD-10-CM

## 2019-08-13 DIAGNOSIS — Z79.01 LONG TERM (CURRENT) USE OF ANTICOAGULANTS: Primary | ICD-10-CM

## 2019-08-13 DIAGNOSIS — H61.21 IMPACTED CERUMEN OF RIGHT EAR: Primary | ICD-10-CM

## 2019-08-13 LAB — INR PPP: 4.1 (ref 2–3)

## 2019-08-13 PROCEDURE — 69210 EAR CERUMEN REMOVAL: ICD-10-PCS | Mod: S$GLB,,, | Performed by: NURSE PRACTITIONER

## 2019-08-13 PROCEDURE — 99499 UNLISTED E&M SERVICE: CPT | Mod: S$GLB,,, | Performed by: NURSE PRACTITIONER

## 2019-08-13 PROCEDURE — 85610 PROTHROMBIN TIME: CPT | Mod: QW,S$GLB,, | Performed by: INTERNAL MEDICINE

## 2019-08-13 PROCEDURE — 93793 ANTICOAG MGMT PT WARFARIN: CPT | Mod: S$GLB,,,

## 2019-08-13 PROCEDURE — 69210 REMOVE IMPACTED EAR WAX UNI: CPT | Mod: S$GLB,,, | Performed by: NURSE PRACTITIONER

## 2019-08-13 PROCEDURE — 99999 PR PBB SHADOW E&M-EST. PATIENT-LVL III: ICD-10-PCS | Mod: PBBFAC,,, | Performed by: NURSE PRACTITIONER

## 2019-08-13 PROCEDURE — 99999 PR PBB SHADOW E&M-EST. PATIENT-LVL III: CPT | Mod: PBBFAC,,, | Performed by: NURSE PRACTITIONER

## 2019-08-13 PROCEDURE — 93793 PR ANTICOAGULANT MGMT FOR PT TAKING WARFARIN: ICD-10-PCS | Mod: S$GLB,,,

## 2019-08-13 PROCEDURE — 85610 POCT INR: ICD-10-PCS | Mod: QW,S$GLB,, | Performed by: INTERNAL MEDICINE

## 2019-08-13 PROCEDURE — 99499 NO LOS: ICD-10-PCS | Mod: S$GLB,,, | Performed by: NURSE PRACTITIONER

## 2019-08-13 NOTE — PROGRESS NOTES
INR not at goal. Medications, chart, and patient findings reviewed. See calendar for adjustments to dose and follow up plan.  Pt findings: Pt started vit b12 about 3 weeks ago & denies any other changes.  Will instruct pt to hold coumadin 8/13 & resume maintenance regimen.  Plan to re-assess in 2 weeks.

## 2019-08-13 NOTE — LETTER
August 13, 2019      Trish Worthy PA-C  [address]  [facility] [address]           [facility] - Otorhinolaryngology  [address]  [facility] [address]  Phone: [phone]  Fax: [phone]          Patient: Ibrahima Phelps   MR Number: [id number]   YOB: 1955   Date of Visit: 8/13/2019       Dear Trish Worthy:    Thank you for referring Ibrahima Phepls to me for evaluation. Attached you will find relevant portions of my assessment and plan of care.    If you have questions, please do not hesitate to call me. I look forward to following Ibrahima Phelps along with you.    Sincerely,    Maris Watson, NP    Enclosure  CC:  No Recipients    If you would like to receive this communication electronically, please contact [email or web address] or [phone] to request more information on [facility] access.    For providers and/or their staff who would like to refer a patient to [facility], please contact us through our one-stop-shop provider referral line, [facility], at [phone].    If you feel you have received this communication in error or would no longer like to receive these types of communications, please e-mail [email or web address]

## 2019-08-13 NOTE — PROCEDURES
Ear Cerumen Removal  Date/Time: 8/13/2019 2:20 PM  Performed by: Maris Watson NP  Authorized by: Maris Watson NP     Location details:  Right ear  Procedure type: curette    Cerumen  Removal Results:  Cerumen completely removed  Patient tolerance:  Patient tolerated the procedure well with no immediate complications     Procedure Note:    Patient was brought to the minor procedure room and using the operating microscope of the right ear canal which was cleaned of ceruminous debris. There was a significant cerumen impaction.  Using a combination of suction, curettes and cup forceps the patient's cerumen impaction was removed. Tympanic membrane intact. Pt tolerated well. There were no complications.

## 2019-08-27 ENCOUNTER — TELEPHONE (OUTPATIENT)
Dept: INTERNAL MEDICINE | Facility: CLINIC | Age: 64
End: 2019-08-27

## 2019-08-27 DIAGNOSIS — M10.379 ACUTE GOUT DUE TO RENAL IMPAIRMENT INVOLVING FOOT, UNSPECIFIED LATERALITY: Primary | ICD-10-CM

## 2019-08-27 RX ORDER — COLCHICINE 0.6 MG/1
TABLET ORAL
Qty: 2 TABLET | Refills: 0 | Status: SHIPPED | OUTPATIENT
Start: 2019-08-27 | End: 2019-11-22

## 2019-08-27 NOTE — TELEPHONE ENCOUNTER
----- Message from Steph Castro sent at 8/27/2019  9:21 AM CDT -----  Contact: self/[phone]  .1 Patient would like to get medical advice.  Symptoms (please be specific): gout  How long has patient had these symptoms: 08/26/19  Pharmacy name and phone#:[id number] - [place], [place] [address] [facility] [phone] (Phone) [phone] (Fax)  Any drug allergies: onfile  Comments: Patient would like to get medical advice.

## 2019-08-27 NOTE — TELEPHONE ENCOUNTER
Spoke with pt, he's having pain in the left ankle,started yesterday. thinks its gout, stated that he got Colchicine for it in the past. Please, send medication.

## 2019-08-27 NOTE — PROGRESS NOTES
Patient called to reschedule today's appointment, reports Gout in L-foot is acting up, can barely walk, reports not taking any medicines for it, has not contacted his Dr., advised patient to call his Dr and if any antibiotics or new medicines are started to call coumadin clinic since last lab was already a bit high, patient verbalized understanding, appointment was rescheduled to 8/29/19

## 2019-08-27 NOTE — TELEPHONE ENCOUNTER
Spoke to pt who reports pain and swelling in his ankle and foot, states it feels similar to previous gout attacks.  Denies trauma or recent rash/insect bites.  States he was rx'ed colchicine in the past which helped.  Based on CrCl, ok to use but c caution and will give at slightly lower dose.  Will give 0.6 mg x1 and then 0.6 mg one hour later.  Risks and benefits discussed c pt.  Discussed increased hydration as well.  If sx fail to improve c tx will need to be assessed c appt.

## 2019-08-29 ENCOUNTER — ANTI-COAG VISIT (OUTPATIENT)
Dept: CARDIOLOGY | Facility: CLINIC | Age: 64
End: 2019-08-29
Payer: COMMERCIAL

## 2019-08-29 DIAGNOSIS — Z79.01 LONG TERM (CURRENT) USE OF ANTICOAGULANTS: Primary | ICD-10-CM

## 2019-08-29 DIAGNOSIS — I48.91 ATRIAL FIBRILLATION WITH RAPID VENTRICULAR RESPONSE: ICD-10-CM

## 2019-08-29 LAB — INR PPP: 4.1 (ref 2–3)

## 2019-08-29 PROCEDURE — 93793 PR ANTICOAGULANT MGMT FOR PT TAKING WARFARIN: ICD-10-PCS | Mod: S$GLB,,,

## 2019-08-29 PROCEDURE — 93793 ANTICOAG MGMT PT WARFARIN: CPT | Mod: S$GLB,,,

## 2019-08-29 PROCEDURE — 85610 POCT INR: ICD-10-PCS | Mod: QW,S$GLB,, | Performed by: INTERNAL MEDICINE

## 2019-08-29 PROCEDURE — 85610 PROTHROMBIN TIME: CPT | Mod: QW,S$GLB,, | Performed by: INTERNAL MEDICINE

## 2019-09-12 ENCOUNTER — ANTI-COAG VISIT (OUTPATIENT)
Dept: CARDIOLOGY | Facility: CLINIC | Age: 64
End: 2019-09-12
Payer: COMMERCIAL

## 2019-09-12 DIAGNOSIS — I48.91 ATRIAL FIBRILLATION WITH RAPID VENTRICULAR RESPONSE: ICD-10-CM

## 2019-09-12 DIAGNOSIS — Z79.01 LONG TERM (CURRENT) USE OF ANTICOAGULANTS: Primary | ICD-10-CM

## 2019-09-12 LAB — INR PPP: 1.2 (ref 2–3)

## 2019-09-12 PROCEDURE — 93793 ANTICOAG MGMT PT WARFARIN: CPT | Mod: S$GLB,,,

## 2019-09-12 PROCEDURE — 85610 PROTHROMBIN TIME: CPT | Mod: QW,S$GLB,, | Performed by: INTERNAL MEDICINE

## 2019-09-12 PROCEDURE — 93793 PR ANTICOAGULANT MGMT FOR PT TAKING WARFARIN: ICD-10-PCS | Mod: S$GLB,,,

## 2019-09-12 PROCEDURE — 85610 POCT INR: ICD-10-PCS | Mod: QW,S$GLB,, | Performed by: INTERNAL MEDICINE

## 2019-09-12 NOTE — PROGRESS NOTES
INR not at goal. Medications, chart, and patient findings reviewed. See calendar for adjustments to dose and follow up plan.  Findings: Pt indicated that he did not follow instructions until he looked at his card this week.  He only took 2.5mg Mon/Thurs & skipped all doses until this week.  See calendar.  He denies any other changes.  Will instruct pt to take 7.5mg 9/12 & 9/13.  Pt to resume reduced dose.  Plan to re-assess in 2 weeks.

## 2019-09-24 ENCOUNTER — PATIENT MESSAGE (OUTPATIENT)
Dept: INTERNAL MEDICINE | Facility: CLINIC | Age: 64
End: 2019-09-24

## 2019-09-25 DIAGNOSIS — N18.30 CKD (CHRONIC KIDNEY DISEASE), STAGE III: ICD-10-CM

## 2019-09-25 RX ORDER — MYCOPHENOLATE MOFETIL 250 MG/1
CAPSULE ORAL
Qty: 120 CAPSULE | Refills: 11 | Status: SHIPPED | OUTPATIENT
Start: 2019-09-25 | End: 2020-01-20 | Stop reason: SDUPTHER

## 2019-09-26 ENCOUNTER — ANTI-COAG VISIT (OUTPATIENT)
Dept: CARDIOLOGY | Facility: CLINIC | Age: 64
End: 2019-09-26
Payer: COMMERCIAL

## 2019-09-26 DIAGNOSIS — Z79.01 LONG TERM (CURRENT) USE OF ANTICOAGULANTS: Primary | ICD-10-CM

## 2019-09-26 DIAGNOSIS — I48.91 ATRIAL FIBRILLATION WITH RAPID VENTRICULAR RESPONSE: ICD-10-CM

## 2019-09-26 LAB — INR PPP: 2.6 (ref 2–3)

## 2019-09-26 PROCEDURE — 85610 PROTHROMBIN TIME: CPT | Mod: QW,S$GLB,, | Performed by: INTERNAL MEDICINE

## 2019-09-26 PROCEDURE — 93793 PR ANTICOAGULANT MGMT FOR PT TAKING WARFARIN: ICD-10-PCS | Mod: S$GLB,,,

## 2019-09-26 PROCEDURE — 85610 POCT INR: ICD-10-PCS | Mod: QW,S$GLB,, | Performed by: INTERNAL MEDICINE

## 2019-09-26 PROCEDURE — 93793 ANTICOAG MGMT PT WARFARIN: CPT | Mod: S$GLB,,,

## 2019-09-26 NOTE — PROGRESS NOTES
INR at goal. Medications and chart reviewed. No changes noted to necessitate adjustment of warfarin or follow-up plan. See calendar.  Findings: Pt states he has been having some congestion & flu like symptoms for which he has been taking Mucinex.  Maintain current regimen & re-assess in 2 weeks.   Patient was re-educated on situations that would require placing a call to the Coumadin Clinic, including bleeding or unusual bruising issues, changes in health, diet or medications,upcoming procedures that require warfarin interruption, and missed Coumadin dose(s). Patient expressed understanding that avoidance of consistency with these parameters could cause fluctuations in INR, leading to more frequent visits and increase risk of adverse events.

## 2019-10-01 ENCOUNTER — OFFICE VISIT (OUTPATIENT)
Dept: INTERNAL MEDICINE | Facility: CLINIC | Age: 64
End: 2019-10-01
Payer: COMMERCIAL

## 2019-10-01 VITALS
DIASTOLIC BLOOD PRESSURE: 82 MMHG | HEART RATE: 62 BPM | SYSTOLIC BLOOD PRESSURE: 140 MMHG | OXYGEN SATURATION: 99 % | TEMPERATURE: 98 F | BODY MASS INDEX: 30.74 KG/M2 | HEIGHT: 73 IN | WEIGHT: 231.94 LBS

## 2019-10-01 DIAGNOSIS — N25.81 SECONDARY RENAL HYPERPARATHYROIDISM: ICD-10-CM

## 2019-10-01 DIAGNOSIS — R63.4 WEIGHT LOSS: ICD-10-CM

## 2019-10-01 DIAGNOSIS — Z12.11 SCREEN FOR COLON CANCER: ICD-10-CM

## 2019-10-01 DIAGNOSIS — N18.30 ANEMIA DUE TO STAGE 3 CHRONIC KIDNEY DISEASE: ICD-10-CM

## 2019-10-01 DIAGNOSIS — R73.03 PREDIABETES: ICD-10-CM

## 2019-10-01 DIAGNOSIS — E78.2 MIXED HYPERLIPIDEMIA: ICD-10-CM

## 2019-10-01 DIAGNOSIS — Z94.0 H/O KIDNEY TRANSPLANT: ICD-10-CM

## 2019-10-01 DIAGNOSIS — D84.821 IMMUNODEFICIENCY DUE TO TREATMENT WITH IMMUNOSUPPRESSIVE MEDICATION: ICD-10-CM

## 2019-10-01 DIAGNOSIS — Z72.0 TOBACCO USE: ICD-10-CM

## 2019-10-01 DIAGNOSIS — D63.1 ANEMIA DUE TO STAGE 3 CHRONIC KIDNEY DISEASE: ICD-10-CM

## 2019-10-01 DIAGNOSIS — Z79.01 LONG TERM (CURRENT) USE OF ANTICOAGULANTS: ICD-10-CM

## 2019-10-01 DIAGNOSIS — I48.0 PAROXYSMAL ATRIAL FIBRILLATION: ICD-10-CM

## 2019-10-01 DIAGNOSIS — N18.30 CKD (CHRONIC KIDNEY DISEASE) STAGE 3, GFR 30-59 ML/MIN: ICD-10-CM

## 2019-10-01 DIAGNOSIS — I50.30 (HFPEF) HEART FAILURE WITH PRESERVED EJECTION FRACTION: ICD-10-CM

## 2019-10-01 DIAGNOSIS — J44.1 COPD WITH ACUTE EXACERBATION: Primary | ICD-10-CM

## 2019-10-01 DIAGNOSIS — I25.10 CORONARY ARTERY DISEASE INVOLVING NATIVE CORONARY ARTERY OF NATIVE HEART WITHOUT ANGINA PECTORIS: ICD-10-CM

## 2019-10-01 DIAGNOSIS — I13.0 HYPERTENSIVE HEART AND RENAL DISEASE WITH RENAL FAILURE, STAGE 1 THROUGH STAGE 4 OR UNSPECIFIED CHRONIC KIDNEY DISEASE, WITH HEART FAILURE: ICD-10-CM

## 2019-10-01 DIAGNOSIS — Z79.899 IMMUNODEFICIENCY DUE TO TREATMENT WITH IMMUNOSUPPRESSIVE MEDICATION: ICD-10-CM

## 2019-10-01 PROBLEM — R61 NIGHT SWEATS: Status: RESOLVED | Noted: 2019-01-11 | Resolved: 2019-10-01

## 2019-10-01 PROCEDURE — 99999 PR PBB SHADOW E&M-EST. PATIENT-LVL III: ICD-10-PCS | Mod: PBBFAC,,, | Performed by: INTERNAL MEDICINE

## 2019-10-01 PROCEDURE — 3077F SYST BP >= 140 MM HG: CPT | Mod: CPTII,S$GLB,, | Performed by: INTERNAL MEDICINE

## 2019-10-01 PROCEDURE — 3079F PR MOST RECENT DIASTOLIC BLOOD PRESSURE 80-89 MM HG: ICD-10-PCS | Mod: CPTII,S$GLB,, | Performed by: INTERNAL MEDICINE

## 2019-10-01 PROCEDURE — 3077F PR MOST RECENT SYSTOLIC BLOOD PRESSURE >= 140 MM HG: ICD-10-PCS | Mod: CPTII,S$GLB,, | Performed by: INTERNAL MEDICINE

## 2019-10-01 PROCEDURE — 99214 OFFICE O/P EST MOD 30 MIN: CPT | Mod: S$GLB,,, | Performed by: INTERNAL MEDICINE

## 2019-10-01 PROCEDURE — 99999 PR PBB SHADOW E&M-EST. PATIENT-LVL III: CPT | Mod: PBBFAC,,, | Performed by: INTERNAL MEDICINE

## 2019-10-01 PROCEDURE — 3079F DIAST BP 80-89 MM HG: CPT | Mod: CPTII,S$GLB,, | Performed by: INTERNAL MEDICINE

## 2019-10-01 PROCEDURE — 99214 PR OFFICE/OUTPT VISIT, EST, LEVL IV, 30-39 MIN: ICD-10-PCS | Mod: S$GLB,,, | Performed by: INTERNAL MEDICINE

## 2019-10-01 PROCEDURE — 3008F BODY MASS INDEX DOCD: CPT | Mod: CPTII,S$GLB,, | Performed by: INTERNAL MEDICINE

## 2019-10-01 PROCEDURE — 3008F PR BODY MASS INDEX (BMI) DOCUMENTED: ICD-10-PCS | Mod: CPTII,S$GLB,, | Performed by: INTERNAL MEDICINE

## 2019-10-01 RX ORDER — CARVEDILOL 25 MG/1
25 TABLET ORAL 2 TIMES DAILY WITH MEALS
Qty: 180 TABLET | Refills: 3 | Status: SHIPPED | OUTPATIENT
Start: 2019-10-01 | End: 2020-01-20 | Stop reason: SDUPTHER

## 2019-10-01 RX ORDER — DOXYCYCLINE 100 MG/1
100 CAPSULE ORAL 2 TIMES DAILY
Qty: 14 CAPSULE | Refills: 0 | Status: SHIPPED | OUTPATIENT
Start: 2019-10-01 | End: 2019-10-08

## 2019-10-01 RX ORDER — ALBUTEROL SULFATE 1.25 MG/3ML
1.25 SOLUTION RESPIRATORY (INHALATION) EVERY 6 HOURS PRN
Qty: 1 BOX | Refills: 3 | Status: SHIPPED | OUTPATIENT
Start: 2019-10-01 | End: 2022-03-28

## 2019-10-01 NOTE — PROGRESS NOTES
"INTERNAL MEDICINE ESTABLISHED PATIENT VISIT NOTE    Subjective:     Chief Complaint: Follow-up; Cough; Shortness of Breath; and Nasal Congestion       Patient ID: Ibrahima Phelps is a 63 y.o. male with HTN c CKD3 and hx renal transplant x2 in 2002 and 2005 and on immunosuppressive meds and secondary hyperPTH, HLD, A fib, hx SVT, CAD c chronic HF c preserved EF, COPD c baseline DAUGHERTY and tob use, thrombocytopenia now resolved, anemia of chronic disease, preDM, GERD, last seen by me in July, here today for f/u.    Renal issues have been followed by Dr. Latif.  Had recent issues c gout and tx'ed c Colchicine which helped.  INR has been followed by Coumadin clinic which was therapeutic as of 9/26.    Today c/o cough x 2 weeks.  Was cutting his grass 2 weeks ago and started sweating and felt very tired and that night started c cough and congestion and felt "rattling" in his chest when he coughs.  Feels like cough is productive but nothing actually coming out.  No fever.  +SOB and wheezing.  Had similar sx in July.    Had taken allegra for allergies in the past but off meds for about a week.  Has been rx'ed flonase in the past but has not been taking it as he was only taking prn in the past and felt it was not helping.      Has also been using prn Alb nebs at home once daily which has really helped and requesting refills.      Past Medical History:  Past Medical History:   Diagnosis Date    (HFpEF) heart failure with preserved ejection fraction 9/25/2017    Atrial fibrillation     CAD (coronary artery disease)     CKD (chronic kidney disease) stage 3, GFR 30-59 ml/min     Dr. Latif    Diverticulosis     Fever blister     Heart attack 2006    Hyperlipidemia     Hypertension     Immunodeficiency due to treatment with immunosuppressive medication     Keloid cicatrix     Metabolic bone disease     Pericarditis     S/P kidney transplant     Thrombocytopenia     Thyroid disease     Unspecified disorder of " kidney and ureter     Stage IIICKD       Home Medications:  Prior to Admission medications    Medication Sig Start Date End Date Taking? Authorizing Provider   acetaminophen (TYLENOL) 325 MG tablet Take 2 tablets (650 mg total) by mouth every 4 (four) hours as needed. 6/17/19   Paula Emmanuel NP   aspirin (ADULT ASPIRIN EC LOW STRENGTH) 81 MG EC tablet Take 1 tablet by mouth Daily. 6/11/12   Historical Provider, MD   atorvastatin (LIPITOR) 10 MG tablet Take 1 tablet (10 mg total) by mouth once daily. 7/8/19   Emre Latif MD   calcium carbonate (OS-TRISH) 500 mg calcium (1,250 mg) tablet Two by mouth twice a day 2/26/19   Emre Latif MD   carvedilol (COREG) 12.5 MG tablet Take 1 tablet (12.5 mg total) by mouth 2 (two) times daily with meals. 7/8/19 7/7/20  Lawson Alaniz MD   colchicine (COLCRYS) 0.6 mg tablet Take one tablet by mouth and then take one tablet my mouth one hour later. 8/27/19   Connie Magdaleno MD   doxazosin (CARDURA) 4 MG tablet Take 1 tablet (4 mg total) by mouth every 12 (twelve) hours. 7/8/19   Emre Latif MD   fexofenadine (ALLEGRA) 60 MG tablet Take 1 tablet by mouth Twice daily as needed. 6/11/12   Historical Provider, MD   fluticasone propionate (FLONASE) 50 mcg/actuation nasal spray 1 spray (50 mcg total) by Each Nostril route once daily. 7/26/19   Trish Worthy PA-C   furosemide (LASIX) 40 MG tablet Take 0.5 tablets (20 mg total) by mouth 2 (two) times daily. 7/8/19   Emre Latif MD   gabapentin (NEURONTIN) 300 MG capsule 1 po q hs x 3 days, then 1 po bid x 3 days, then 1 po tid.  Patient taking differently: daily as needed. 1 po q hs x 3 days, then 1 po bid x 3 days, then 1 po tid. 1/16/18   Adilia Fernandez PA-C   loratadine (CLARITIN) 10 mg tablet Take 1 tablet (10 mg total) by mouth once daily. 7/26/19 8/25/19  Trish Worthy PA-C   multivitamin (THERAGRAN) per tablet Take 1 tablet by mouth Daily. 6/11/12   Historical Provider, MD   mycophenolate  (CELLCEPT) 250 mg Cap TAKE 2 CAPSULES BY MOUTH  EVERY 12 HOURS 9/25/19   Emre Latif MD   nicotine (NICODERM CQ) 21 mg/24 hr Place 1 patch onto the skin once daily. 7/8/19   Emre Latif MD   NIFEdipine (PROCARDIA-XL) 90 MG (OSM) 24 hr tablet Take 1 tablet (90 mg total) by mouth once daily. 7/8/19 7/7/20  Lawson Alnaiz MD   paricalcitol (ZEMPLAR) 1 MCG capsule One po MWF 7/8/19   Emre Latif MD   potassium chloride (KLOR-CON) 10 MEQ TbSR Take 1 tablet (10 mEq total) by mouth once daily. 7/8/19   Connie Magdaleno MD   predniSONE (DELTASONE) 5 MG tablet Take 1 tablet (5 mg total) by mouth every morning. 7/8/19   Emre Latif MD   ranitidine (ZANTAC) 150 MG tablet Take 1 tablet (150 mg total) by mouth 2 (two) times daily. 6/11/18 7/19/19  Emre Latif MD   spironolactone (ALDACTONE) 25 MG tablet Take 1 tablet (25 mg total) by mouth once daily. 7/8/19   Emre Latif MD   tacrolimus (PROGRAF) 1 MG Cap TAKE 4 CAPSULES BY MOUTH EVERY MORNING AND 3 CAPSULES EVERY EVENING 8/7/19   Emre Latif MD   triamcinolone acetonide 0.1% (KENALOG) 0.1 % cream AAA bid to rash on ankles 2/26/19   Lyric Jovel MD   vitamin D 1000 units Tab Take 370 mg by mouth 2 (two) times daily. 2 tablets BID    Historical Provider, MD   warfarin (COUMADIN) 5 MG tablet Take 1 tablet (5 mg total) by mouth Daily. 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   Substance Use Topics  "   Alcohol use: No    Drug use: No        Review of Systems   Constitutional: Negative for chills, fatigue and fever.   Respiratory: Positive for cough, shortness of breath and wheezing. Negative for chest tightness.    Cardiovascular: Negative for chest pain.   Gastrointestinal: Negative for abdominal pain and blood in stool.   Genitourinary: Negative for dysuria and frequency.         Health Maintenance:     Immunizations:   Influenza declined by pt, Risks and benefits were discussed with patient.  TDap is up to date 3/2018  Pneumovax 12/2016, Prevnar 13 2/2017, pvax repeat rec at 64  Shingrix rec once back in stock, avoid zostavax     Cancer Screening:  Colonoscopy: is up to date. 9/2014, polyps, hyperplastic and tubular adenoma, rec f/u in 5 yrs, will schedule at f/u    Objective:   BP (!) 140/82   Pulse 62   Temp 98.1 °F (36.7 °C)   Ht 6' 1" (1.854 m)   Wt 105.2 kg (231 lb 14.8 oz)   SpO2 99%   BMI 30.60 kg/m²        General: AAO x3, no apparent distress  HEENT: PERRL, OP clear  CV: RRR, no m/r/g  Pulm: Lungs CTAB, no crackles, no wheezes  Abd: s/NT/ND +BS  Extremities: no c/c/e    Labs:     Lab Results   Component Value Date    WBC 7.62 07/01/2019    HGB 12.7 (L) 07/01/2019    HCT 40.1 07/01/2019    MCV 82 07/01/2019     07/01/2019     Sodium   Date Value Ref Range Status   07/01/2019 142 136 - 145 mmol/L Final     Potassium   Date Value Ref Range Status   07/01/2019 3.8 3.5 - 5.1 mmol/L Final     Chloride   Date Value Ref Range Status   07/01/2019 108 95 - 110 mmol/L Final     CO2   Date Value Ref Range Status   07/01/2019 22 (L) 23 - 29 mmol/L Final     Glucose   Date Value Ref Range Status   07/01/2019 105 70 - 110 mg/dL Final     BUN, Bld   Date Value Ref Range Status   07/01/2019 26 (H) 8 - 23 mg/dL Final     Creatinine   Date Value Ref Range Status   07/01/2019 1.9 (H) 0.5 - 1.4 mg/dL Final     Calcium   Date Value Ref Range Status   07/01/2019 8.2 (L) 8.7 - 10.5 mg/dL Final     Total " Protein   Date Value Ref Range Status   06/15/2019 6.1 6.0 - 8.4 g/dL Final     Albumin   Date Value Ref Range Status   07/01/2019 3.3 (L) 3.5 - 5.2 g/dL Final     Total Bilirubin   Date Value Ref Range Status   06/15/2019 0.3 0.1 - 1.0 mg/dL Final     Comment:     For infants and newborns, interpretation of results should be based  on gestational age, weight and in agreement with clinical  observations.  Premature Infant recommended reference ranges:  Up to 24 hours.............<8.0 mg/dL  Up to 48 hours............<12.0 mg/dL  3-5 days..................<15.0 mg/dL  6-29 days.................<15.0 mg/dL       Alkaline Phosphatase   Date Value Ref Range Status   06/15/2019 68 55 - 135 U/L Final     AST   Date Value Ref Range Status   06/15/2019 21 10 - 40 U/L Final     ALT   Date Value Ref Range Status   06/15/2019 16 10 - 44 U/L Final     Anion Gap   Date Value Ref Range Status   07/01/2019 12 8 - 16 mmol/L Final     eGFR if    Date Value Ref Range Status   07/01/2019 42.1 (A) >60 mL/min/1.73 m^2 Final     eGFR if non    Date Value Ref Range Status   07/01/2019 36.4 (A) >60 mL/min/1.73 m^2 Final     Comment:     Calculation used to obtain the estimated glomerular filtration  rate (eGFR) is the CKD-EPI equation.        Lab Results   Component Value Date    HGBA1C 5.8 (H) 02/02/2019     Lab Results   Component Value Date    LDLCALC 69.4 04/12/2019     Lab Results   Component Value Date    TSH 1.434 02/02/2019         Assessment/Plan     Ibrahima was seen today for follow-up, cough, shortness of breath and nasal congestion.    Diagnoses and all orders for this visit:    COPD with acute exacerbation  Tobacco use  Will tx exacerbation with doxy, do not feel additional steroids needed at this time.  Refill on albuterol given, improving overall.  Discussed possible need to add spiriva vs advair at f/u depending on sx of DAUGHERTY and cough.  Of note, has also had stress test in June which was neg  for ischemia.  Also advised to resume his home allergy meds: allegra and flonase, discussed use of meds daily  -     doxycycline (VIBRAMYCIN) 100 MG Cap; Take 1 capsule (100 mg total) by mouth 2 (two) times daily. for 7 days  -     albuterol (ACCUNEB) 1.25 mg/3 mL Nebu; Take 3 mLs (1.25 mg total) by nebulization every 6 (six) hours as needed. 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    CKD (chronic kidney disease) stage 3, GFR 30-59 ml/min  H/O kidney transplant  Immunodeficiency due to treatment with immunosuppressive medication  Secondary renal hyperparathyroidism  Cr close to baseline on last check  Sees Dr. Latif, has f/u c all labs in Nov    Coronary artery disease involving native coronary artery of native heart without angina pectoris  Paroxysmal atrial fibrillation  (HFpEF) heart failure with preserved ejection fraction  Long term (current) use of anticoagulants [V58.61]  Euvolemic, followed by Dr. Blunt, cont f/u, rate and rhythm controlled and on coumadin for stroke prevention    Anemia due to stage 3 chronic kidney disease  H/h slightly improved on last check, f/u labs next mo via Nephro    Prediabetes  Mild, cont lifestyle modifications    Mixed hyperlipidemia  LDL 69 at goal on last check in April, can repeat at next visit    Screen for colon cancer  -     Case request GI: COLONOSCOPY    Weight loss  Resolved over last few checks, w/u neg as per last note.      HM as above  RTC in 4 mos for f/u, sooner if needed.    Connie Magdaleno MD  Department of Internal Medicine - [facility]  10/01/2019

## 2019-10-05 ENCOUNTER — HOSPITAL ENCOUNTER (EMERGENCY)
Facility: HOSPITAL | Age: 64
Discharge: HOME | End: 2019-10-05
Attending: EMERGENCY MEDICINE
Payer: COMMERCIAL

## 2019-10-05 VITALS
SYSTOLIC BLOOD PRESSURE: 151 MMHG | DIASTOLIC BLOOD PRESSURE: 70 MMHG | RESPIRATION RATE: 15 BRPM | TEMPERATURE: 98 F | BODY MASS INDEX: 31.81 KG/M2 | WEIGHT: 240 LBS | HEART RATE: 65 BPM | HEIGHT: 73 IN | OXYGEN SATURATION: 100 %

## 2019-10-05 DIAGNOSIS — M79.673 FOOT PAIN: Primary | ICD-10-CM

## 2019-10-05 PROCEDURE — 99283 EMERGENCY DEPT VISIT LOW MDM: CPT | Mod: ,,, | Performed by: EMERGENCY MEDICINE

## 2019-10-05 PROCEDURE — 99283 PR EMERGENCY DEPT VISIT,LEVEL III: ICD-10-PCS | Mod: ,,, | Performed by: EMERGENCY MEDICINE

## 2019-10-05 PROCEDURE — 99283 EMERGENCY DEPT VISIT LOW MDM: CPT | Mod: 25

## 2019-10-05 NOTE — ED PROVIDER NOTES
I received checkout at 7:00 a.m..  Plan is to followup x-ray and discharge with Podiatry follow-up.  No sign of infection      I reviewed x-ray.  No acute fractures or foreign bodies.  Will discharge home.  Recommend follow up with Podiatry     Dario Reyes MD  10/05/19 0733

## 2019-10-05 NOTE — ED PROVIDER NOTES
"Source of History:  Patient    Chief complaint:  Insect Bite (Patient reports that he got bit by a spider a couple of weeks ago on his left ankle. States that his ankle started swelling tonight. Reports "sharp" pain in his ankle. No swelling noted in triage. )    HPI:  Ibrahima Phelps is a 63 y.o. male with history of remote kidney transplant, CAD, pre diabetes, gout presenting to emergency department with complaint of pain and swelling of his left foot.  Patient states that the pain began approximately 2 weeks ago after he thought a bug bit him on his left foot.  The swelling and pain is in the left instep.  Patient states that the foot swelled up slightly, and then it resolved.  This evening the swelling returned.  He does not have pain when he is not moving it.  He has been taking Tylenol as needed for pain with some relief.  He has never had similar issues in the past.  He denies any trauma.  There has been no fever or skin change.  He did not actually see a bug bite him.  He has no pain elsewhere.  The ankle does not hurt.  This does not feel like his previous episodes of gout.    ROS: As per HPI and below:  Review of Systems   Constitutional: Negative for fever.   Musculoskeletal: Negative for falls and joint pain.   Skin: Negative for rash.     Review of patient's allergies indicates:   Allergen Reactions    Ace inhibitors Swelling    Verapamil Other (See Comments)     Other reaction(s): Unknown    Povidone-iodine Itching       No current facility-administered medications on file prior to encounter.      Current Outpatient Medications on File Prior to Encounter   Medication Sig Dispense Refill    acetaminophen (TYLENOL) 325 MG tablet Take 2 tablets (650 mg total) by mouth every 4 (four) hours as needed.  0    albuterol (ACCUNEB) 1.25 mg/3 mL Nebu Take 3 mLs (1.25 mg total) by nebulization every 6 (six) hours as needed. Rescue 1 Box 3    aspirin (ADULT ASPIRIN EC LOW STRENGTH) 81 MG EC tablet Take 1 tablet " by mouth Daily.      atorvastatin (LIPITOR) 10 MG tablet Take 1 tablet (10 mg total) by mouth once daily. 90 tablet 3    calcium carbonate (OS-TRISH) 500 mg calcium (1,250 mg) tablet Two by mouth twice a day 120 tablet 11    carvedilol (COREG) 25 MG tablet Take 1 tablet (25 mg total) by mouth 2 (two) times daily with meals. 180 tablet 3    colchicine (COLCRYS) 0.6 mg tablet Take one tablet by mouth and then take one tablet my mouth one hour later. (Patient not taking: Reported on 10/1/2019) 2 tablet 0    doxazosin (CARDURA) 4 MG tablet Take 1 tablet (4 mg total) by mouth every 12 (twelve) hours. 270 tablet 3    doxycycline (VIBRAMYCIN) 100 MG Cap Take 1 capsule (100 mg total) by mouth 2 (two) times daily. for 7 days 14 capsule 0    fexofenadine (ALLEGRA) 60 MG tablet Take 1 tablet by mouth Twice daily as needed.      fluticasone propionate (FLONASE) 50 mcg/actuation nasal spray 1 spray (50 mcg total) by Each Nostril route once daily. 18.2 mL 0    furosemide (LASIX) 40 MG tablet Take 0.5 tablets (20 mg total) by mouth 2 (two) times daily. 180 tablet 3    gabapentin (NEURONTIN) 300 MG capsule 1 po q hs x 3 days, then 1 po bid x 3 days, then 1 po tid. (Patient taking differently: daily as needed. 1 po q hs x 3 days, then 1 po bid x 3 days, then 1 po tid.) 90 capsule 2    multivitamin (THERAGRAN) per tablet Take 1 tablet by mouth Daily.      mycophenolate (CELLCEPT) 250 mg Cap TAKE 2 CAPSULES BY MOUTH  EVERY 12 HOURS 120 capsule 11    nicotine (NICODERM CQ) 21 mg/24 hr Place 1 patch onto the skin once daily. 28 patch 4    NIFEdipine (PROCARDIA-XL) 90 MG (OSM) 24 hr tablet Take 1 tablet (90 mg total) by mouth once daily. 90 tablet 3    paricalcitol (ZEMPLAR) 1 MCG capsule One po MWF 90 capsule 3    potassium chloride (KLOR-CON) 10 MEQ TbSR Take 1 tablet (10 mEq total) by mouth once daily. 90 tablet 3    predniSONE (DELTASONE) 5 MG tablet Take 1 tablet (5 mg total) by mouth every morning. 90 tablet 3     ranitidine (ZANTAC) 150 MG tablet Take 1 tablet (150 mg total) by mouth 2 (two) times daily. 180 tablet 6    spironolactone (ALDACTONE) 25 MG tablet Take 1 tablet (25 mg total) by mouth once daily. 90 tablet 3    tacrolimus (PROGRAF) 1 MG Cap TAKE 4 CAPSULES BY MOUTH EVERY MORNING AND 3 CAPSULES EVERY EVENING 210 capsule 6    triamcinolone acetonide 0.1% (KENALOG) 0.1 % cream AAA bid to rash on ankles 60 g 3    vitamin D 1000 units Tab Take 370 mg by mouth 2 (two) times daily. 2 tablets BID      warfarin (COUMADIN) 5 MG tablet Take 1 tablet (5 mg total) by mouth Daily. Or as directed by Coumadin Clinic 30 tablet 5       PMH:  As per HPI and below:  Past Medical History:   Diagnosis Date    (HFpEF) heart failure with preserved ejection fraction 9/25/2017    Atrial fibrillation     CAD (coronary artery disease)     CKD (chronic kidney disease) stage 3, GFR 30-59 ml/min     Dr. Latif    Diverticulosis     Fever blister     Heart attack 2006    Hyperlipidemia     Hypertension     Immunodeficiency due to treatment with immunosuppressive medication     Keloid cicatrix     Metabolic bone disease     Pericarditis     S/P kidney transplant     Thrombocytopenia     Thyroid disease     Unspecified disorder of kidney and ureter     Stage IIICKD     Past Surgical History:   Procedure Laterality Date    CARDIOVERSION  04/30/15    CHOLECYSTECTOMY      COLONOSCOPY  April 20, 2011    Diverticulosis, repeat recommended in 3 yrs., repeat colonoscopy 2014 revealed 2 polyps.  He should return in 5 years.    CORONARY ANGIOPLASTY WITH STENT PLACEMENT  9/13/2006    KIDNEY TRANSPLANT  2005    PARATHYROIDECTOMY         Social History     Socioeconomic History    Marital status:      Spouse name: Not on file    Number of children: Not on file    Years of education: Not on file    Highest education level: Not on file   Occupational History    Occupation:    Social Needs    Financial  resource strain: Not on file    Food insecurity:     Worry: Not on file     Inability: Not on file    Transportation needs:     Medical: Not on file     Non-medical: Not on file   Tobacco Use    Smoking status: Current Some Day Smoker     Packs/day: 0.50     Years: 40.00     Pack years: 20.00     Types: Cigarettes    Smokeless tobacco: Never Used    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   Substance and Sexual Activity    Alcohol use: No    Drug use: No    Sexual activity: Yes     Partners: Female   Lifestyle    Physical activity:     Days per week: Not on file     Minutes per session: Not on file    Stress: Not on file   Relationships    Social connections:     Talks on phone: Not on file     Gets together: Not on file     Attends Restorationism service: Not on file     Active member of club or organization: Not on file     Attends meetings of clubs or organizations: Not on file     Relationship status: Not on file   Other Topics Concern    Not on file   Social History Narrative    Retired        Family History   Problem Relation Age of Onset    No Known Problems Sister     No Known Problems Brother     Thyroid disease Mother         s/p surgery    Heart disease Father         had pacemaker    No Known Problems Sister     Kidney failure Sister     Kidney disease Sister     No Known Problems Sister     Kidney disease Brother     Kidney failure Brother         s/p transplant    Diabetes Mellitus Neg Hx     Stroke Neg Hx     Heart attack Neg Hx     Cancer Neg Hx     Celiac disease Neg Hx     Cirrhosis Neg Hx     Colon cancer Neg Hx     Esophageal cancer Neg Hx     Inflammatory bowel disease Neg Hx     Rectal cancer Neg Hx     Stomach cancer Neg Hx     Ulcerative colitis Neg Hx     Liver disease Neg Hx     Liver cancer Neg Hx     Crohn's disease Neg Hx     Melanoma Neg Hx        Physical Exam:    Vitals:    10/05/19  0443   BP: (!) 179/79   Pulse: 62   Resp: 18   Temp: 97.7 °F (36.5 °C)     Gen: No acute distress.  Mental Status:  Alert and oriented x 3.  Appropriate, conversant.  Skin: Warm, dry. No rashes seen.  ENT: Oropharynx clear.    MSK:  No left calf tenderness to palpation.  Full active and passive range of motion of the left knee without tenderness to palpation.  Full active and passive range of motion of the left ankle without tenderness to palpation. No swelling of the joints.  There is a an area of tenderness palpation in the left instep without overlying skin change, fluctuance.  Distal neurovascular exam is intact, patient has intact sensation.  Neuro:  No focal neuro deficit    Laboratory Studies:  Labs Reviewed - No data to display    X-rays (independently interpreted by me):  No fracture noted. No foreign body noted.    Chart reviewed.     Imaging Results          X-Ray Foot Complete Left (In process)                 Medications Given:  Medications - No data to display    MDM:    63 y.o. male with complaint of pain and swelling of the left instep of his foot, which resolved and then recurred.  He is afebrile, stable, nontoxic.  There is no evidence of cellulitis.  No fluctuance consistent with abscess.  There is point tenderness to palpation, will obtain an x-ray to evaluate for any fracture.    There is no ankle involvement.  He has full range of motion, no effusion noted, no overlying skin change.    I do not believe this is consistent with septic joint or gout as it does not seem to actually be affecting the left ankle, but rather a focal area of the left instep.    My review of the x-ray is unremarkable.  Plan for patient to follow up with Podiatry.  Signed out to oncoming physician pending results of x-ray interpretation for final disposition, likely home.    Diagnostic Impression:    1. Foot pain      Patient and/or family understands the plan and is in agreement, verbalized understanding, questions  answered    Osiris Poon MD  Emergency Medicine           Osiris Poon MD  10/05/19 [id number]

## 2019-10-05 NOTE — DISCHARGE INSTRUCTIONS
Xray did not show any broken bones or any other problem.     Take acetaminophen (also called Tylenol) for your pain.   - You may take up to 1,000 mg every 6 hours as needed  - Do not take more than 4,000 mg in 24 hours (1 day) as this may cause liver damage  - Many other medicines include acetaminophen (Tylenol) such as: Norco, Vicodin, Tylenol #3, many cold medicines, etc.  - Please read all labels carefully and do not combine medicines that include acetaminophen.  - If you have a history of liver disease or drink alcohol heavily, do not take acetaminophen (Tylenol) since it can damage your liver    Follow-Up Plan:  - Follow-up with podiatry doctor within 3 - 5 days  - Additional testing and/or evaluation as directed by your primary doctor or podiatrist    Return to the Emergency Department for symptoms including but not limited to: worsening symptoms, shortness of breath or chest pain, vomiting with inability to hold down fluids, fevers greater than 100.4°F, passing out/fainting/unconsciousness, or other concerning symptoms.

## 2019-10-05 NOTE — ED TRIAGE NOTES
Pt thinks he was bitten 2 weeks ago  The foot was fine until tonight increased pain warm tender to touch left foot

## 2019-10-10 RX ORDER — ATORVASTATIN CALCIUM 10 MG/1
10 TABLET, FILM COATED ORAL DAILY
Qty: 30 TABLET | Refills: 0 | Status: SHIPPED | OUTPATIENT
Start: 2019-10-10 | End: 2020-01-21

## 2019-10-11 ENCOUNTER — ANTI-COAG VISIT (OUTPATIENT)
Dept: CARDIOLOGY | Facility: CLINIC | Age: 64
End: 2019-10-11
Payer: COMMERCIAL

## 2019-10-11 DIAGNOSIS — Z79.01 LONG TERM (CURRENT) USE OF ANTICOAGULANTS: Primary | ICD-10-CM

## 2019-10-11 DIAGNOSIS — I48.91 ATRIAL FIBRILLATION WITH RAPID VENTRICULAR RESPONSE: ICD-10-CM

## 2019-10-11 LAB — INR PPP: 2.7 (ref 2–3)

## 2019-10-11 PROCEDURE — 93793 PR ANTICOAGULANT MGMT FOR PT TAKING WARFARIN: ICD-10-PCS | Mod: S$GLB,,,

## 2019-10-11 PROCEDURE — 93793 ANTICOAG MGMT PT WARFARIN: CPT | Mod: S$GLB,,,

## 2019-10-11 PROCEDURE — 85610 PROTHROMBIN TIME: CPT | Mod: QW,S$GLB,, | Performed by: INTERNAL MEDICINE

## 2019-10-11 PROCEDURE — 85610 POCT INR: ICD-10-PCS | Mod: QW,S$GLB,, | Performed by: INTERNAL MEDICINE

## 2019-10-11 NOTE — PROGRESS NOTES
INR at goal. Medications and chart reviewed. No changes noted to necessitate adjustment of warfarin or follow-up plan. See calendar.  Findings: Pt continues to have swelling in his foot after having a spider bite.  He presented to the ED this past weekend where it was x-rayed.  He has been referred to pediatry.  Maintain current regimen & re-assess in 2 weeks.   Patient was re-educated on situations that would require placing a call to the Coumadin Clinic, including bleeding or unusual bruising issues, changes in health, diet or medications,upcoming procedures that require warfarin interruption, and missed Coumadin dose(s). Patient expressed understanding that avoidance of consistency with these parameters could cause fluctuations in INR, leading to more frequent visits and increase risk of adverse events.

## 2019-10-25 ENCOUNTER — ANTI-COAG VISIT (OUTPATIENT)
Dept: CARDIOLOGY | Facility: CLINIC | Age: 64
End: 2019-10-25
Payer: COMMERCIAL

## 2019-10-25 DIAGNOSIS — I48.91 ATRIAL FIBRILLATION WITH RAPID VENTRICULAR RESPONSE: ICD-10-CM

## 2019-10-25 DIAGNOSIS — Z79.01 LONG TERM (CURRENT) USE OF ANTICOAGULANTS: Primary | ICD-10-CM

## 2019-10-25 LAB — INR PPP: 2.2 (ref 2–3)

## 2019-10-25 PROCEDURE — 93793 ANTICOAG MGMT PT WARFARIN: CPT | Mod: S$GLB,,,

## 2019-10-25 PROCEDURE — 85610 PROTHROMBIN TIME: CPT | Mod: QW,S$GLB,, | Performed by: INTERNAL MEDICINE

## 2019-10-25 PROCEDURE — 93793 PR ANTICOAGULANT MGMT FOR PT TAKING WARFARIN: ICD-10-PCS | Mod: S$GLB,,,

## 2019-10-25 PROCEDURE — 85610 POCT INR: ICD-10-PCS | Mod: QW,S$GLB,, | Performed by: INTERNAL MEDICINE

## 2019-10-25 NOTE — PROGRESS NOTES
INR at goal. Medications and chart reviewed. No changes noted to necessitate adjustment of warfarin or follow-up plan. See calendar.  Pt denies any changes.  Maintain current regimen & re-assess in 3 weeks.   Patient was re-educated on situations that would require placing a call to the [facility], including bleeding or unusual bruising issues, changes in health, diet or medications,upcoming procedures that require warfarin interruption, and missed Coumadin dose(s). Patient expressed understanding that avoidance of consistency with these parameters could cause fluctuations in INR, leading to more frequent visits and increase risk of adverse events.

## 2019-11-15 ENCOUNTER — ANTI-COAG VISIT (OUTPATIENT)
Dept: CARDIOLOGY | Facility: CLINIC | Age: 64
End: 2019-11-15
Payer: COMMERCIAL

## 2019-11-15 ENCOUNTER — LAB VISIT (OUTPATIENT)
Dept: LAB | Facility: HOSPITAL | Age: 64
End: 2019-11-15
Attending: INTERNAL MEDICINE
Payer: COMMERCIAL

## 2019-11-15 DIAGNOSIS — I48.91 ATRIAL FIBRILLATION WITH RAPID VENTRICULAR RESPONSE: ICD-10-CM

## 2019-11-15 DIAGNOSIS — Z79.01 LONG TERM (CURRENT) USE OF ANTICOAGULANTS: ICD-10-CM

## 2019-11-15 DIAGNOSIS — E11.9 TYPE 2 DIABETES MELLITUS WITHOUT COMPLICATION, WITHOUT LONG-TERM CURRENT USE OF INSULIN: ICD-10-CM

## 2019-11-15 DIAGNOSIS — N18.30 CKD (CHRONIC KIDNEY DISEASE) STAGE 3, GFR 30-59 ML/MIN: ICD-10-CM

## 2019-11-15 LAB
ALBUMIN SERPL BCP-MCNC: 3.7 G/DL (ref 3.5–5.2)
ANION GAP SERPL CALC-SCNC: 11 MMOL/L (ref 8–16)
BASOPHILS # BLD AUTO: 0.01 K/UL (ref 0–0.2)
BASOPHILS NFR BLD: 0.2 % (ref 0–1.9)
BUN SERPL-MCNC: 28 MG/DL (ref 8–23)
CALCIUM SERPL-MCNC: 9.7 MG/DL (ref 8.7–10.5)
CHLORIDE SERPL-SCNC: 107 MMOL/L (ref 95–110)
CO2 SERPL-SCNC: 25 MMOL/L (ref 23–29)
CREAT SERPL-MCNC: 2.4 MG/DL (ref 0.5–1.4)
DIFFERENTIAL METHOD: ABNORMAL
EOSINOPHIL # BLD AUTO: 0.3 K/UL (ref 0–0.5)
EOSINOPHIL NFR BLD: 4 % (ref 0–8)
ERYTHROCYTE [DISTWIDTH] IN BLOOD BY AUTOMATED COUNT: 14 % (ref 11.5–14.5)
EST. GFR  (AFRICAN AMERICAN): 31.5 ML/MIN/1.73 M^2
EST. GFR  (NON AFRICAN AMERICAN): 27.3 ML/MIN/1.73 M^2
ESTIMATED AVG GLUCOSE: 123 MG/DL (ref 68–131)
GLUCOSE SERPL-MCNC: 101 MG/DL (ref 70–110)
HBA1C MFR BLD HPLC: 5.9 % (ref 4–5.6)
HCT VFR BLD AUTO: 41.3 % (ref 40–54)
HGB BLD-MCNC: 12.2 G/DL (ref 14–18)
IMM GRANULOCYTES # BLD AUTO: 0.03 K/UL (ref 0–0.04)
IMM GRANULOCYTES NFR BLD AUTO: 0.5 % (ref 0–0.5)
INR PPP: 2.1 (ref 0.8–1.2)
LYMPHOCYTES # BLD AUTO: 1.6 K/UL (ref 1–4.8)
LYMPHOCYTES NFR BLD: 23.9 % (ref 18–48)
MAGNESIUM SERPL-MCNC: 1.9 MG/DL (ref 1.6–2.6)
MCH RBC QN AUTO: 25.1 PG (ref 27–31)
MCHC RBC AUTO-ENTMCNC: 29.5 G/DL (ref 32–36)
MCV RBC AUTO: 85 FL (ref 82–98)
MONOCYTES # BLD AUTO: 0.7 K/UL (ref 0.3–1)
MONOCYTES NFR BLD: 9.9 % (ref 4–15)
NEUTROPHILS # BLD AUTO: 4.1 K/UL (ref 1.8–7.7)
NEUTROPHILS NFR BLD: 61.5 % (ref 38–73)
NRBC BLD-RTO: 0 /100 WBC
PHOSPHATE SERPL-MCNC: 4 MG/DL (ref 2.7–4.5)
PLATELET # BLD AUTO: 184 K/UL (ref 150–350)
PMV BLD AUTO: 12.4 FL (ref 9.2–12.9)
POTASSIUM SERPL-SCNC: 4.4 MMOL/L (ref 3.5–5.1)
PROTHROMBIN TIME: 20.4 SEC (ref 9–12.5)
PTH-INTACT SERPL-MCNC: 35 PG/ML (ref 9–77)
RBC # BLD AUTO: 4.86 M/UL (ref 4.6–6.2)
SODIUM SERPL-SCNC: 143 MMOL/L (ref 136–145)
TACROLIMUS BLD-MCNC: 11.4 NG/ML (ref 5–15)
WBC # BLD AUTO: 6.57 K/UL (ref 3.9–12.7)

## 2019-11-15 PROCEDURE — 36415 COLL VENOUS BLD VENIPUNCTURE: CPT

## 2019-11-15 PROCEDURE — 80069 RENAL FUNCTION PANEL: CPT

## 2019-11-15 PROCEDURE — 80197 ASSAY OF TACROLIMUS: CPT

## 2019-11-15 PROCEDURE — 93793 ANTICOAG MGMT PT WARFARIN: CPT | Mod: S$GLB,,, | Performed by: PHARMACIST

## 2019-11-15 PROCEDURE — 85610 PROTHROMBIN TIME: CPT

## 2019-11-15 PROCEDURE — 83735 ASSAY OF MAGNESIUM: CPT

## 2019-11-15 PROCEDURE — 83970 ASSAY OF PARATHORMONE: CPT

## 2019-11-15 PROCEDURE — 85025 COMPLETE CBC W/AUTO DIFF WBC: CPT

## 2019-11-15 PROCEDURE — 83036 HEMOGLOBIN GLYCOSYLATED A1C: CPT

## 2019-11-15 PROCEDURE — 93793 PR ANTICOAGULANT MGMT FOR PT TAKING WARFARIN: ICD-10-PCS | Mod: S$GLB,,, | Performed by: PHARMACIST

## 2019-11-16 ENCOUNTER — TELEPHONE (OUTPATIENT)
Dept: TRANSPLANT | Facility: HOSPITAL | Age: 64
End: 2019-11-16

## 2019-11-16 ENCOUNTER — HOSPITAL ENCOUNTER (EMERGENCY)
Facility: HOSPITAL | Age: 64
Discharge: HOME | End: 2019-11-16
Attending: EMERGENCY MEDICINE
Payer: COMMERCIAL

## 2019-11-16 VITALS
HEART RATE: 50 BPM | HEIGHT: 73 IN | TEMPERATURE: 98 F | RESPIRATION RATE: 20 BRPM | WEIGHT: 240 LBS | BODY MASS INDEX: 31.81 KG/M2 | DIASTOLIC BLOOD PRESSURE: 68 MMHG | SYSTOLIC BLOOD PRESSURE: 135 MMHG | OXYGEN SATURATION: 97 %

## 2019-11-16 DIAGNOSIS — Z94.0 KIDNEY TRANSPLANT RECIPIENT: ICD-10-CM

## 2019-11-16 DIAGNOSIS — M10.9 GOUTY ARTHRITIS: Primary | ICD-10-CM

## 2019-11-16 DIAGNOSIS — N18.9 CHRONIC KIDNEY DISEASE, UNSPECIFIED CKD STAGE: ICD-10-CM

## 2019-11-16 DIAGNOSIS — M79.671 RIGHT FOOT PAIN: ICD-10-CM

## 2019-11-16 LAB
ALBUMIN SERPL BCP-MCNC: 3.7 G/DL (ref 3.5–5.2)
ALP SERPL-CCNC: 75 U/L (ref 55–135)
ALT SERPL W/O P-5'-P-CCNC: 17 U/L (ref 10–44)
ANION GAP SERPL CALC-SCNC: 9 MMOL/L (ref 8–16)
APPEARANCE FLD: NORMAL
AST SERPL-CCNC: 19 U/L (ref 10–40)
BACTERIA #/AREA URNS AUTO: NORMAL /HPF
BASOPHILS # BLD AUTO: 0.02 K/UL (ref 0–0.2)
BASOPHILS NFR BLD: 0.2 % (ref 0–1.9)
BILIRUB SERPL-MCNC: 0.3 MG/DL (ref 0.1–1)
BILIRUB UR QL STRIP: NEGATIVE
BODY FLD TYPE: NORMAL
BODY FLD TYPE: NORMAL
BUN SERPL-MCNC: 32 MG/DL (ref 8–23)
CALCIUM SERPL-MCNC: 8.7 MG/DL (ref 8.7–10.5)
CHLORIDE SERPL-SCNC: 108 MMOL/L (ref 95–110)
CLARITY UR REFRACT.AUTO: CLEAR
CO2 SERPL-SCNC: 25 MMOL/L (ref 23–29)
COLOR FLD: NORMAL
COLOR UR AUTO: YELLOW
CREAT SERPL-MCNC: 2.2 MG/DL (ref 0.5–1.4)
CRP SERPL-MCNC: 7.8 MG/L (ref 0–8.2)
CRYSTALS FLD MICRO: NEGATIVE
DIFFERENTIAL METHOD: ABNORMAL
EOSINOPHIL # BLD AUTO: 0.3 K/UL (ref 0–0.5)
EOSINOPHIL NFR BLD: 3.6 % (ref 0–8)
EOSINOPHIL NFR FLD MANUAL: 6 %
ERYTHROCYTE [DISTWIDTH] IN BLOOD BY AUTOMATED COUNT: 14 % (ref 11.5–14.5)
ERYTHROCYTE [SEDIMENTATION RATE] IN BLOOD BY WESTERGREN METHOD: 65 MM/HR (ref 0–23)
EST. GFR  (AFRICAN AMERICAN): 35 ML/MIN/1.73 M^2
EST. GFR  (NON AFRICAN AMERICAN): 30.3 ML/MIN/1.73 M^2
GLUCOSE SERPL-MCNC: 109 MG/DL (ref 70–110)
GLUCOSE UR QL STRIP: NEGATIVE
GRAM STN SPEC: NORMAL
GRAM STN SPEC: NORMAL
HCT VFR BLD AUTO: 37.5 % (ref 40–54)
HGB BLD-MCNC: 11.5 G/DL (ref 14–18)
HGB UR QL STRIP: NEGATIVE
HYALINE CASTS UR QL AUTO: 0 /LPF
IMM GRANULOCYTES # BLD AUTO: 0.03 K/UL (ref 0–0.04)
IMM GRANULOCYTES NFR BLD AUTO: 0.4 % (ref 0–0.5)
INR PPP: 1.7 (ref 0.8–1.2)
KETONES UR QL STRIP: NEGATIVE
LEUKOCYTE ESTERASE UR QL STRIP: NEGATIVE
LYMPHOCYTES # BLD AUTO: 1.3 K/UL (ref 1–4.8)
LYMPHOCYTES NFR BLD: 16 % (ref 18–48)
MCH RBC QN AUTO: 25.6 PG (ref 27–31)
MCHC RBC AUTO-ENTMCNC: 30.7 G/DL (ref 32–36)
MCV RBC AUTO: 84 FL (ref 82–98)
MICROSCOPIC COMMENT: NORMAL
MONOCYTES # BLD AUTO: 0.7 K/UL (ref 0.3–1)
MONOCYTES NFR BLD: 8.3 % (ref 4–15)
NEUTROPHILS # BLD AUTO: 6 K/UL (ref 1.8–7.7)
NEUTROPHILS NFR BLD: 71.5 % (ref 38–73)
NEUTROPHILS NFR FLD MANUAL: 94 %
NITRITE UR QL STRIP: NEGATIVE
NRBC BLD-RTO: 0 /100 WBC
PATH INTERP FLD-IMP: NORMAL
PH UR STRIP: 5 [PH] (ref 5–8)
PLATELET # BLD AUTO: 184 K/UL (ref 150–350)
PMV BLD AUTO: 12.6 FL (ref 9.2–12.9)
POTASSIUM SERPL-SCNC: 4.4 MMOL/L (ref 3.5–5.1)
PROT SERPL-MCNC: 7.3 G/DL (ref 6–8.4)
PROT UR QL STRIP: ABNORMAL
PROTHROMBIN TIME: 16.2 SEC (ref 9–12.5)
RBC # BLD AUTO: 4.49 M/UL (ref 4.6–6.2)
RBC #/AREA URNS AUTO: 0 /HPF (ref 0–4)
SODIUM SERPL-SCNC: 142 MMOL/L (ref 136–145)
SP GR UR STRIP: 1.01 (ref 1–1.03)
SQUAMOUS #/AREA URNS AUTO: 0 /HPF
URATE SERPL-MCNC: 10 MG/DL (ref 3.4–7)
URN SPEC COLLECT METH UR: ABNORMAL
WBC # BLD AUTO: 8.32 K/UL (ref 3.9–12.7)
WBC # FLD: 4585 /CU MM
WBC #/AREA URNS AUTO: 0 /HPF (ref 0–5)

## 2019-11-16 PROCEDURE — 25000003 PHARM REV CODE 250: Performed by: PHYSICIAN ASSISTANT

## 2019-11-16 PROCEDURE — 86140 C-REACTIVE PROTEIN: CPT

## 2019-11-16 PROCEDURE — 87205 SMEAR GRAM STAIN: CPT

## 2019-11-16 PROCEDURE — 99283 EMERGENCY DEPT VISIT LOW MDM: CPT | Mod: 25

## 2019-11-16 PROCEDURE — 87015 SPECIMEN INFECT AGNT CONCNTJ: CPT

## 2019-11-16 PROCEDURE — 99285 PR EMERGENCY DEPT VISIT,LEVEL V: ICD-10-PCS | Mod: ,,, | Performed by: EMERGENCY MEDICINE

## 2019-11-16 PROCEDURE — 87206 SMEAR FLUORESCENT/ACID STAI: CPT

## 2019-11-16 PROCEDURE — 63600175 PHARM REV CODE 636 W HCPCS: Performed by: PHYSICIAN ASSISTANT

## 2019-11-16 PROCEDURE — 85610 PROTHROMBIN TIME: CPT

## 2019-11-16 PROCEDURE — 89051 BODY FLUID CELL COUNT: CPT

## 2019-11-16 PROCEDURE — 99285 EMERGENCY DEPT VISIT HI MDM: CPT | Mod: ,,, | Performed by: EMERGENCY MEDICINE

## 2019-11-16 PROCEDURE — 20605 DRAIN/INJ JOINT/BURSA W/O US: CPT

## 2019-11-16 PROCEDURE — 80053 COMPREHEN METABOLIC PANEL: CPT

## 2019-11-16 PROCEDURE — 87116 MYCOBACTERIA CULTURE: CPT

## 2019-11-16 PROCEDURE — 85652 RBC SED RATE AUTOMATED: CPT

## 2019-11-16 PROCEDURE — 87075 CULTR BACTERIA EXCEPT BLOOD: CPT

## 2019-11-16 PROCEDURE — 89060 EXAM SYNOVIAL FLUID CRYSTALS: CPT

## 2019-11-16 PROCEDURE — 85025 COMPLETE CBC W/AUTO DIFF WBC: CPT

## 2019-11-16 PROCEDURE — 81001 URINALYSIS AUTO W/SCOPE: CPT

## 2019-11-16 PROCEDURE — 87102 FUNGUS ISOLATION CULTURE: CPT

## 2019-11-16 PROCEDURE — 84550 ASSAY OF BLOOD/URIC ACID: CPT

## 2019-11-16 PROCEDURE — 87070 CULTURE OTHR SPECIMN AEROBIC: CPT

## 2019-11-16 RX ORDER — MORPHINE SULFATE 15 MG/1
15 TABLET ORAL
Status: COMPLETED | OUTPATIENT
Start: 2019-11-16 | End: 2019-11-16

## 2019-11-16 RX ORDER — PREDNISONE 20 MG/1
40 TABLET ORAL DAILY
Qty: 4 TABLET | Refills: 0 | Status: SHIPPED | OUTPATIENT
Start: 2019-11-16 | End: 2019-11-18

## 2019-11-16 RX ORDER — ACETAMINOPHEN 500 MG
1000 TABLET ORAL
Status: COMPLETED | OUTPATIENT
Start: 2019-11-16 | End: 2019-11-16

## 2019-11-16 RX ORDER — PREDNISONE 20 MG/1
40 TABLET ORAL
Status: COMPLETED | OUTPATIENT
Start: 2019-11-16 | End: 2019-11-16

## 2019-11-16 RX ORDER — MORPHINE SULFATE 15 MG/1
15 TABLET ORAL EVERY 4 HOURS PRN
Qty: 7 TABLET | Refills: 0 | Status: SHIPPED | OUTPATIENT
Start: 2019-11-16 | End: 2019-11-19

## 2019-11-16 RX ADMIN — PREDNISONE 40 MG: 20 TABLET ORAL at 03:11

## 2019-11-16 RX ADMIN — ACETAMINOPHEN 1000 MG: 500 TABLET ORAL at 09:11

## 2019-11-16 RX ADMIN — MORPHINE SULFATE 15 MG: 15 TABLET ORAL at 03:11

## 2019-11-16 NOTE — ED PROVIDER NOTES
Encounter Date: 11/16/2019    SCRIBE #1 NOTE: I, Tere Rinaldi, am scribing for, and in the presence of,  Dr. Richard. I have scribed the following portions of the note - the APC attestation.       History     Chief Complaint   Patient presents with    Foot Pain     right foot pain, denies injury- states he has noticed some redness across top of foot- also complains of right ankle pain     64 year old male with medical history of HTN, CKD3, Hx of Renal Tx x2 in 2002 and 2005 and on immunosuppressive meds and secondary hyperPTH, HLD, AFib on Coumadin, CAD, HFrEF, COPD, Tobacco use (1/4 PPD), Anemia of chronic disease, preDM, GERD, Hx of Gout presenting to the ED with the chief complaint of right foot pain. Patient reports having atraumatic intermittent episodes of right foot pain for the past week. He locates the pain over his medial malleolus and the base of his first MTP joint. He reports lightly touching the surface of his skin is more painful than firmly grabbing his foot. He reports worsening pain with walking and has been using a crutch for ambulation assistance. He reports history of gout in his left wrist and elbow. He denies history of gout in his foot, although chart review shows he was recently seen in the ED on 10/5/2019 for left foot pain and sent home with an RX for colchicine. He states the pain has never been this severe or lasted as long with previous episodes of gout. He denies fever, chest pain, SOB, abdominal pain, nausea, vomiting, urinary or bowel movement changes.     The history is provided by the spouse and the patient.     Review of patient's allergies indicates:   Allergen Reactions    Ace inhibitors Swelling    Verapamil Other (See Comments)     Other reaction(s): Unknown    Povidone-iodine Itching     Past Medical History:   Diagnosis Date    (HFpEF) heart failure with preserved ejection fraction 9/25/2017    Atrial fibrillation     CAD (coronary artery disease)     CKD (chronic  kidney disease) stage 3, GFR 30-59 ml/min     Dr. Latif    Diverticulosis     Fever blister     Heart attack 2006    Hyperlipidemia     Hypertension     Immunodeficiency due to treatment with immunosuppressive medication     Keloid cicatrix     Metabolic bone disease     Pericarditis     S/P kidney transplant     Thrombocytopenia     Thyroid disease     Unspecified disorder of kidney and ureter     Stage IIICKD     Past Surgical History:   Procedure Laterality Date    CARDIOVERSION  04/30/15    CHOLECYSTECTOMY      COLONOSCOPY  April 20, 2011    Diverticulosis, repeat recommended in 3 yrs., repeat colonoscopy 2014 revealed 2 polyps.  He should return in 5 years.    CORONARY ANGIOPLASTY WITH STENT PLACEMENT  9/13/2006    KIDNEY TRANSPLANT  2005    PARATHYROIDECTOMY       Family History   Problem Relation Age of Onset    No Known Problems Sister     No Known Problems Brother     Thyroid disease Mother         s/p surgery    Heart disease Father         had pacemaker    No Known Problems Sister     Kidney failure Sister     Kidney disease Sister     No Known Problems Sister     Kidney disease Brother     Kidney failure Brother         s/p transplant    Diabetes Mellitus Neg Hx     Stroke Neg Hx     Heart attack Neg Hx     Cancer Neg Hx     Celiac disease Neg Hx     Cirrhosis Neg Hx     Colon cancer Neg Hx     Esophageal cancer Neg Hx     Inflammatory bowel disease Neg Hx     Rectal cancer Neg Hx     Stomach cancer Neg Hx     Ulcerative colitis Neg Hx     Liver disease Neg Hx     Liver cancer Neg Hx     Crohn's disease Neg Hx     Melanoma Neg Hx      Social History     Tobacco Use    Smoking status: Current Some Day Smoker     Packs/day: 0.50     Years: 40.00     Pack years: 20.00     Types: Cigarettes    Smokeless tobacco: Never Used    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   Substance Use Topics    Alcohol  use: No    Drug use: No     Review of Systems   Constitutional: Negative for chills, diaphoresis and fever.   HENT: Negative for congestion, sore throat and trouble swallowing.    Eyes: Negative for pain and redness.   Respiratory: Negative for cough and shortness of breath.    Cardiovascular: Negative for chest pain.   Gastrointestinal: Negative for abdominal pain, nausea and vomiting.   Genitourinary: Negative for dysuria and hematuria.   Musculoskeletal: Positive for arthralgias. Negative for back pain, neck pain and neck stiffness.   Skin: Negative for rash and wound.   Allergic/Immunologic: Positive for immunocompromised state.   Neurological: Negative for weakness.   Hematological: Does not bruise/bleed easily.       Physical Exam     Initial Vitals [11/16/19 0839]   BP Pulse Resp Temp SpO2   (!) 162/77 77 15 98.1 °F (36.7 °C) 98 %      MAP       --         Physical Exam    Constitutional: He appears well-developed and well-nourished. He is not diaphoretic. No distress.   HENT:   Head: Normocephalic and atraumatic.   Mouth/Throat: Oropharynx is clear and moist. No oropharyngeal exudate.   Eyes: EOM are normal. Pupils are equal, round, and reactive to light.   Neck: Normal range of motion. Neck supple.   Cardiovascular: Normal rate, regular rhythm and intact distal pulses.   +2 DP pulses bilaterally   Pulmonary/Chest: Breath sounds normal. No respiratory distress. He has no wheezes.   Abdominal: Soft. There is no tenderness.   Musculoskeletal:   Erythema and mild edema along medial midfoot of R foot with tenderness to palpation. Limited ROM of R ankle 2/2 pain. No tenderness along MTP joints.   Neurological: He is alert and oriented to person, place, and time. No cranial nerve deficit or sensory deficit.   Skin: Skin is warm and dry.       ED Course   Procedures  Labs Reviewed   CBC W/ AUTO DIFFERENTIAL - Abnormal; Notable for the following components:       Result Value    RBC 4.49 (*)     Hemoglobin 11.5 (*)      Hematocrit 37.5 (*)     Mean Corpuscular Hemoglobin 25.6 (*)     Mean Corpuscular Hemoglobin Conc 30.7 (*)     Lymph% 16.0 (*)     All other components within normal limits   COMPREHENSIVE METABOLIC PANEL - Abnormal; Notable for the following components:    BUN, Bld 32 (*)     Creatinine 2.2 (*)     eGFR if  35.0 (*)     eGFR if non  30.3 (*)     All other components within normal limits   URIC ACID - Abnormal; Notable for the following components:    Uric Acid 10.0 (*)     All other components within normal limits   SEDIMENTATION RATE - Abnormal; Notable for the following components:    Sed Rate 65 (*)     All other components within normal limits   URINALYSIS, REFLEX TO URINE CULTURE - Abnormal; Notable for the following components:    Protein, UA 1+ (*)     All other components within normal limits    Narrative:     Preferred Collection Type->Urine, Clean Catch   PROTIME-INR - Abnormal; Notable for the following components:    Prothrombin Time 16.2 (*)     INR 1.7 (*)     All other components within normal limits   GRAM STAIN   CULTURE, AEROBIC  (SPECIFY SOURCE)   AFB CULTURE & SMEAR   CULTURE, FUNGUS   CULTURE, ANAEROBIC   C-REACTIVE PROTEIN   WBC & DIFF,BODY FLUID   BODY FLUID CRYSTAL   URINALYSIS MICROSCOPIC    Narrative:     Preferred Collection Type->Urine, Clean Catch   PATHOLOGIST REVIEW, BODY FLUID          Imaging Results          X-Ray Foot Complete Right (Final result)  Result time 11/16/19 10:59:16    Final result by Ha Llanes MD (11/16/19 10:59:16)                 Impression:      No acute abnormality.  Probable old injury at tip of medial malleolus.  Mild DJD and calcaneal enthesopathy.      Electronically signed by: Ha Llanes MD  Date:    11/16/2019  Time:    10:59             Narrative:    EXAMINATION:  XR ANKLE COMPLETE 3 VIEW RIGHT; XR FOOT COMPLETE 3 VIEW RIGHT    CLINICAL HISTORY:  Pain in right foot    TECHNIQUE:  AP, lateral, and oblique  images of the right ankle and foot were performed.    COMPARISON:  None    FINDINGS:  Skeletal structures show no dislocation or definite acute fracture.  Small  ossification at the tip of the medial malleolus at the ankle has sclerotic margin indicating this is likely old.  Ankle joint space is satisfactory.  Enthesophytes are present at the posterior and plantar aspects of the calcaneus.  Mild DJD and bunion are present at the 1st MTP joint in the foot.  Fifth PIP joint looks to have been partially resected suggesting hammertoe release.  No erosions are detected.  Vascular calcifications are noted in the soft tissues.  No focal soft tissue swelling is noted.                               X-Ray Ankle Complete Right (Final result)  Result time 11/16/19 10:59:16    Final result by Ha Llanes MD (11/16/19 10:59:16)                 Impression:      No acute abnormality.  Probable old injury at tip of medial malleolus.  Mild DJD and calcaneal enthesopathy.      Electronically signed by: Ha Llanes MD  Date:    11/16/2019  Time:    10:59             Narrative:    EXAMINATION:  XR ANKLE COMPLETE 3 VIEW RIGHT; XR FOOT COMPLETE 3 VIEW RIGHT    CLINICAL HISTORY:  Pain in right foot    TECHNIQUE:  AP, lateral, and oblique images of the right ankle and foot were performed.    COMPARISON:  None    FINDINGS:  Skeletal structures show no dislocation or definite acute fracture.  Small  ossification at the tip of the medial malleolus at the ankle has sclerotic margin indicating this is likely old.  Ankle joint space is satisfactory.  Enthesophytes are present at the posterior and plantar aspects of the calcaneus.  Mild DJD and bunion are present at the 1st MTP joint in the foot.  Fifth PIP joint looks to have been partially resected suggesting hammertoe release.  No erosions are detected.  Vascular calcifications are noted in the soft tissues.  No focal soft tissue swelling is noted.                                  Medical Decision Making:   History:   Old Medical Records: I decided to obtain old medical records.  Old Records Summarized: records from clinic visits.  Clinical Tests:   Lab Tests: Ordered and Reviewed  Radiological Study: Ordered and Reviewed  Other:   I have discussed this case with another health care provider.       <> Summary of the Discussion: Ortho       APC / Resident Notes:   64 year old male with medical history of HTN, CKD3, Hx of Renal Tx x2 in 2002 and 2005 and on immunosuppressive meds and secondary hyperPTH, HLD, AFib on Coumadin, CAD, HFrEF, COPD, Tobacco use (1/4 PPD), Anemia of chronic disease, preDM, GERD, Hx of Gout presenting to the ED c/o atraumatic right foot and ankle pain for 1 week. DDx includes but not limited to gout, septic joint, cellulitis, transplant rejection, osteoarthritis. Will check labs and x-rays.     Work-up shows WBC 8.3, H/H 11/37, Uric acid 10 (BL 7.4-9), ESR 65, CRP 7.8, Crt elevated 2.2 (1.5-1.9 baseline), GFR 35 (42-56 baseline)  X-rays negative for fracture or dislocation    Discussed with ortho who performed arthrocentesis of ankle at bedside. Fluid analysis negative for infection and negative for crystal formation. Presentation most likely gout given his previous history. Discussed patient with KTM regarding recent gout flares and up-trending GFR and Crt. Advised quick outpatient follow-up with Nephrology or hospitalization if patient does not believe he can obtain a timely appointment. Patient states he can call his nephrologist on Monday and should not have problems obtaining an appointment. Will give RX for Prednisone over the next 3 days with short RX for MSIR for breakthrough pain. Advised continuing Tylenol. Patient expresses understanding and agreeable to the plan. Return to ED precautions given for new, worsening, or concerning symptoms. I have discussed the care of this patient with my supervising physician.          Scribe Attestation:    Scribe #1: I performed the above scribed service and the documentation accurately describes the services I performed. I attest to the accuracy of the note.    Attending Attestation:     Physician Attestation Statement for NP/PA:   I have conducted a face to face encounter with this patient in addition to the NP/PA, due to Medical Complexity    Other NP/PA Attestation Additions:    History of Present Illness: 64 y.o. male s/p kidney transplant in  presents complaining of right foot and ankle pain that has been gradually worsening over the past several days. He denies injury. He began walking with crutches yesterday. The pain is worse with weight bearing.    Physical Exam: There is edema of the ankle and mid foot. No tenderness of the MTP joints or pain with movement of these. There is tenderness and erythema of the medial mid foot. There is warmth and tenderness of the ankle, mostly medially and anteriorly. Patient has pain with passive ROM of the ankle.    Medical Decision Makin y.o.transplant patient, also on metformin for diabetes, presents with right ankle swelling, redness, and pain.    Differential diagnoses includes septic ankle, gout, cellulitis, sprain, fracture. This could represent gout; however, my concern is a possibility of septic joint in this immunocompromised patient.     Plan to check inflammatory marker, xray, and discuss with Orthopedics.         Attending ED Notes:   Inflammatory markers were mildly elevated as was uric acid.  Patient was evaluated by Orthopedics who performed an arthrocentesis of the ankle.  Joint fluid was sent for analysis.  This showed no evidence of organisms or significantly elevated cell count concerning for septic joint.  It also did not show any evidence of crystals however orthopedics recommended presumptively treating for gout given this patient's history of frequent gout flares.  We also discussed the case with [facility] staff, Dr. Sampson, who recommended  treating the patient with prednisone for possible gout flare as well as as an anti-inflammatory for nonspecific inflammatory arthritis.  I discussed with the patient the possibility of being admitted to the hospital for further treatment versus being discharged.  Patient prefers to go home and states he has an appointment with his nephrologist already scheduled for November 22nd.  I did recommend he contact his nephrologist in 2 days (today is Saturday) to discuss this ED visit and the fact that his GFR and creatinine have been worsening.  Patient and his wife expressed understanding.                        Clinical Impression:       ICD-10-CM ICD-9-CM   1. Gouty arthritis M10.9 274.00   2. Right foot pain M79.671 729.5   3. Kidney transplant recipient Z94.0 V42.0   4. Chronic kidney disease, unspecified CKD stage N18.9 585.9         Disposition:   Disposition: Discharged  Condition: Stable                     Kalia Manuel PA-C  11/16/19 1747       Blanca Sutton MD  11/17/19 [id number]

## 2019-11-16 NOTE — TELEPHONE ENCOUNTER
Notified by ED physician that he presented with swollen painful joints and difficulty walking.  Seen by Orthopedics, who aspirated the joint and suspect gout.  However, no crystals noted.      I advised if he has gout without evidence of infection, it would be appropriate to give Solu-Medrol or prednisone 40 mg now.  He could take that up to 3 days.      Additionally, somewhat worsened renal function noted.  I asked him to encourage the patient to hydrate well and will notify transplant coordinator to schedule patient for lab ( renal panel, tacrolimus all, CBC) Tuesday 11/19.

## 2019-11-16 NOTE — DISCHARGE INSTRUCTIONS
Take the prescribed Prednisone for the next 2 days  You may use Tylenol for pain control. Use the prescribed Morphine tablets only as needed for breakthrough pain  Call your nephrologist on Monday for further evaluation and management    Our goal in the emergency department is to always give you outstanding care and exceptional service. You may receive a survey by mail or e-mail in the next week regarding your experience in our ED. We would greatly appreciate your completing and returning the survey. Your feedback provides us with a way to recognize our staff who give very good care and it helps us learn how to improve when your experience was below our aspiration of excellence.

## 2019-11-16 NOTE — CONSULTS
Orthopedic Surgery  Consult Note    Ibrahima Phelps  11/16/2019    CC: right ankle pain    HPI: Ibrahima Phelps is a 64 y.o. male with PMHx significant for kidney transplant, HTN, MI, afib, gout who presents with right ankle pain. Patients states that for the past week he has had on and off pain in his right great toe. He states that the pain has moved to his R ankle 1 day ago making it difficult to bear weight on it. Denies any fevers at home On coumadin for blood thinners. Denies other MSK pains or paresthesias.       Past Medical History:   Diagnosis Date    (HFpEF) heart failure with preserved ejection fraction 9/25/2017    Atrial fibrillation     CAD (coronary artery disease)     CKD (chronic kidney disease) stage 3, GFR 30-59 ml/min     Dr. Latif    Diverticulosis     Fever blister     Heart attack 2006    Hyperlipidemia     Hypertension     Immunodeficiency due to treatment with immunosuppressive medication     Keloid cicatrix     Metabolic bone disease     Pericarditis     S/P kidney transplant     Thrombocytopenia     Thyroid disease     Unspecified disorder of kidney and ureter     Stage IIICKD     Past Surgical History:   Procedure Laterality Date    CARDIOVERSION  04/30/15    CHOLECYSTECTOMY      COLONOSCOPY  April 20, 2011    Diverticulosis, repeat recommended in 3 yrs., repeat colonoscopy 2014 revealed 2 polyps.  He should return in 5 years.    CORONARY ANGIOPLASTY WITH STENT PLACEMENT  9/13/2006    KIDNEY TRANSPLANT  2005    PARATHYROIDECTOMY       Family History   Problem Relation Age of Onset    No Known Problems Sister     No Known Problems Brother     Thyroid disease Mother         s/p surgery    Heart disease Father         had pacemaker    No Known Problems Sister     Kidney failure Sister     Kidney disease Sister     No Known Problems Sister     Kidney disease Brother     Kidney failure Brother         s/p transplant    Diabetes Mellitus Neg Hx     Stroke Neg  Hx     Heart attack Neg Hx     Cancer Neg Hx     Celiac disease Neg Hx     Cirrhosis Neg Hx     Colon cancer Neg Hx     Esophageal cancer Neg Hx     Inflammatory bowel disease Neg Hx     Rectal cancer Neg Hx     Stomach cancer Neg Hx     Ulcerative colitis Neg Hx     Liver disease Neg Hx     Liver cancer Neg Hx     Crohn's disease Neg Hx     Melanoma Neg Hx      Social History     Socioeconomic History    Marital status:      Spouse name: Not on file    Number of children: Not on file    Years of education: Not on file    Highest education level: Not on file   Occupational History    Occupation:    Social Needs    Financial resource strain: Not on file    Food insecurity:     Worry: Not on file     Inability: Not on file    Transportation needs:     Medical: Not on file     Non-medical: Not on file   Tobacco Use    Smoking status: Current Some Day Smoker     Packs/day: 0.50     Years: 40.00     Pack years: 20.00     Types: Cigarettes    Smokeless tobacco: Never Used    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   Substance and Sexual Activity    Alcohol use: No    Drug use: No    Sexual activity: Yes     Partners: Female   Lifestyle    Physical activity:     Days per week: Not on file     Minutes per session: Not on file    Stress: Not on file   Relationships    Social connections:     Talks on phone: Not on file     Gets together: Not on file     Attends [employer] service: Not on file     Active member of club or organization: Not on file     Attends meetings of clubs or organizations: Not on file     Relationship status: Not on file   Other Topics Concern    Not on file   Social History Narrative    Retired      No current facility-administered medications on file prior to encounter.      Current Outpatient Medications on File Prior to Encounter   Medication Sig    acetaminophen (TYLENOL) 325 MG  tablet Take 2 tablets (650 mg total) by mouth every 4 (four) hours as needed.    albuterol (ACCUNEB) 1.25 mg/3 mL Nebu Take 3 mLs (1.25 mg total) by nebulization every 6 (six) hours as needed. Rescue    aspirin (ADULT ASPIRIN EC LOW STRENGTH) 81 MG EC tablet Take 1 tablet by mouth Daily.    atorvastatin (LIPITOR) 10 MG tablet Take 1 tablet (10 mg total) by mouth once daily.    atorvastatin (LIPITOR) 10 MG tablet TAKE 1 TABLET (10 MG TOTAL) BY MOUTH ONCE DAILY.    calcium carbonate (OS-TRISH) 500 mg calcium (1,250 mg) tablet Two by mouth twice a day    carvedilol (COREG) 25 MG tablet Take 1 tablet (25 mg total) by mouth 2 (two) times daily with meals.    colchicine (COLCRYS) 0.6 mg tablet Take one tablet by mouth and then take one tablet my mouth one hour later. (Patient not taking: Reported on 10/1/2019)    doxazosin (CARDURA) 4 MG tablet Take 1 tablet (4 mg total) by mouth every 12 (twelve) hours.    fexofenadine (ALLEGRA) 60 MG tablet Take 1 tablet by mouth Twice daily as needed.    fluticasone propionate (FLONASE) 50 mcg/actuation nasal spray 1 spray (50 mcg total) by Each Nostril route once daily.    furosemide (LASIX) 40 MG tablet Take 0.5 tablets (20 mg total) by mouth 2 (two) times daily.    gabapentin (NEURONTIN) 300 MG capsule 1 po q hs x 3 days, then 1 po bid x 3 days, then 1 po tid. (Patient taking differently: daily as needed. 1 po q hs x 3 days, then 1 po bid x 3 days, then 1 po tid.)    multivitamin (THERAGRAN) per tablet Take 1 tablet by mouth Daily.    mycophenolate (CELLCEPT) 250 mg Cap TAKE 2 CAPSULES BY MOUTH  EVERY 12 HOURS    nicotine (NICODERM CQ) 21 mg/24 hr Place 1 patch onto the skin once daily.    NIFEdipine (PROCARDIA-XL) 90 MG (OSM) 24 hr tablet Take 1 tablet (90 mg total) by mouth once daily.    paricalcitol (ZEMPLAR) 1 MCG capsule One po MWF    potassium chloride (KLOR-CON) 10 MEQ TbSR Take 1 tablet (10 mEq total) by mouth once daily.    predniSONE (DELTASONE) 5 MG  tablet Take 1 tablet (5 mg total) by mouth every morning.    ranitidine (ZANTAC) 150 MG tablet Take 1 tablet (150 mg total) by mouth 2 (two) times daily.    spironolactone (ALDACTONE) 25 MG tablet Take 1 tablet (25 mg total) by mouth once daily.    tacrolimus (PROGRAF) 1 MG Cap TAKE 4 CAPSULES BY MOUTH EVERY MORNING AND 3 CAPSULES EVERY EVENING    triamcinolone acetonide 0.1% (KENALOG) 0.1 % cream AAA bid to rash on ankles    vitamin D 1000 units Tab Take 370 mg by mouth 2 (two) times daily. 2 tablets BID    warfarin (COUMADIN) 5 MG tablet Take 1 tablet (5 mg total) by mouth Daily. Or as directed by [facility]         Review of Systems:  Constitutional: negative for fevers  Eyes: negative visual changes  ENT: negative for hearing loss  Respiratory: negative for dyspnea  Cardiovascular: negative for chest pain  Gastrointestinal: negative for abdominal pain  Genitourinary: negative for dysuria  Neurological: negative for headaches  Behavioral/Psych: negative for hallucinations  Endocrine: negative for temperature intolerance      Physical Exam:    Temp:  [98.1 °F (36.7 °C)-98.2 °F (36.8 °C)] 98.2 °F (36.8 °C)  Pulse:  [60-77] 60  Resp:  [15-16] 16  SpO2:  [98 %-99 %] 99 %  BP: (134-162)/(64-77) 134/64    Vitals: Afebrile.  Vital signs stable.  General: No acute distress.  HEENT: Normocephalic. Atraumatic. Sclera anicteric. No tracheal deviation.  Cardio: Regular rate.  Chest: No increased work of breathing.  Abdominal: Nondistended.  Extremities: No cyanosis.  No clubbing.    Skin: No generalized rash.  Neuro: Awake. Alert. Oriented to person, place, time, and situation.  Psych: Normal appearance. Cooperative.  Appropriate mood.  Appropriate affect.      MSK:  RLE:  Skin intact throughout  mild swelling around ankle  No ecchymosis, erythema, signs of cellulitis  TTP globally around  the ankle and dorsum of the foot  No tenderness to palpation of proximal, middle, or distal aspects of femur  No tenderness to  palpation of proximal or middle aspects tibia or fibula  ROM at ankle limited due to pain  Full painless ROM of hip and knee  Compartments soft  SILT Sa/Doe/DP/SP/T  Motor intact EHL/FHL/TA/Gastroc  2+ DP/PT pulses  Brisk capillary refill    All other joints (shoulder/elbow/wrist/hip/knee/ankle) were examined and had full ROM and were non-tender to palpation.        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 No new fracture/dislocaion    Procedure Note: right ankle aspiration  Patient was explained risks, benefits, and alternatives to treatment and verbalized consent to proceed. Time out was performed and patient name, , site, and procedure were confirmed. Skin was sterilely prepared with alcohol solution. 18 gauge needle on a 60 cc syringe was used for aspiration through anterior approach. 2 cc of bloody/straw colored fluid collected. Fluid and cultures were obtained and sent for analysis. Aspiration site was covered with a bandaid.    Joint Fluid Analysis:  WBC: 4585  Segs: 94%  Crystals: neg  Gram Stain: neg        Assessment/Plan:  Ibrahima Phelps is a 64 y.o. male with ankle pain   - R ankle aspirated in ED, fluid analysis returned not suspicious for any infection. Crystals were negative.  - uric acid elevated to 10  - ESR 65, CRP 7.8, WBC 8.3    No orthopedic surgery intervention needed. Recommend empirically treating for gout due to strong history and high uric acid. Recommend Rheumatology f/u. No orthopedic f/u needed.        Kee Mccain MD  Orthopedic Surgery Resident  2019

## 2019-11-16 NOTE — ED TRIAGE NOTES
Pt presents with right foot pain that has been intermittent for the last week and has worsened over the last 24 hours. Pt noticed redness to the anterior right foot yesterday. Pt using a crutch for support.

## 2019-11-18 ENCOUNTER — TELEPHONE (OUTPATIENT)
Dept: NEPHROLOGY | Facility: CLINIC | Age: 64
End: 2019-11-18

## 2019-11-18 DIAGNOSIS — Z94.0 KIDNEY REPLACED BY TRANSPLANT: Primary | ICD-10-CM

## 2019-11-18 NOTE — TELEPHONE ENCOUNTER
----- Message from Jose Manuel Vazquez sent at 11/18/2019  3:45 PM CST -----  Contact: pt  Type:  Patient Returning Call    Who Called:  pt    Best Call Back Number:  [phone]  Additional Information:  Pt was told to call office today by EFREM Figueroa at [facility] on Sat to see if she wanted to move appt sooner than current date of 11/22/19.

## 2019-11-19 ENCOUNTER — LAB VISIT (OUTPATIENT)
Dept: LAB | Facility: HOSPITAL | Age: 64
End: 2019-11-19
Payer: COMMERCIAL

## 2019-11-19 ENCOUNTER — TELEPHONE (OUTPATIENT)
Dept: TRANSPLANT | Facility: CLINIC | Age: 64
End: 2019-11-19

## 2019-11-19 DIAGNOSIS — Z94.0 KIDNEY REPLACED BY TRANSPLANT: ICD-10-CM

## 2019-11-19 LAB
ALBUMIN SERPL BCP-MCNC: 3.5 G/DL (ref 3.5–5.2)
ANION GAP SERPL CALC-SCNC: 10 MMOL/L (ref 8–16)
BACTERIA SPEC AEROBE CULT: NO GROWTH
BASOPHILS # BLD AUTO: 0.01 K/UL (ref 0–0.2)
BASOPHILS NFR BLD: 0.1 % (ref 0–1.9)
BUN SERPL-MCNC: 41 MG/DL (ref 8–23)
CALCIUM SERPL-MCNC: 8.5 MG/DL (ref 8.7–10.5)
CHLORIDE SERPL-SCNC: 109 MMOL/L (ref 95–110)
CO2 SERPL-SCNC: 22 MMOL/L (ref 23–29)
CREAT SERPL-MCNC: 2.2 MG/DL (ref 0.5–1.4)
DIFFERENTIAL METHOD: ABNORMAL
EOSINOPHIL # BLD AUTO: 0 K/UL (ref 0–0.5)
EOSINOPHIL NFR BLD: 0.1 % (ref 0–8)
ERYTHROCYTE [DISTWIDTH] IN BLOOD BY AUTOMATED COUNT: 14.2 % (ref 11.5–14.5)
EST. GFR  (AFRICAN AMERICAN): 35 ML/MIN/1.73 M^2
EST. GFR  (NON AFRICAN AMERICAN): 30.3 ML/MIN/1.73 M^2
GLUCOSE SERPL-MCNC: 115 MG/DL (ref 70–110)
HCT VFR BLD AUTO: 38 % (ref 40–54)
HGB BLD-MCNC: 11.3 G/DL (ref 14–18)
IMM GRANULOCYTES # BLD AUTO: 0.09 K/UL (ref 0–0.04)
IMM GRANULOCYTES NFR BLD AUTO: 0.8 % (ref 0–0.5)
LYMPHOCYTES # BLD AUTO: 1.1 K/UL (ref 1–4.8)
LYMPHOCYTES NFR BLD: 9 % (ref 18–48)
MAGNESIUM SERPL-MCNC: 2 MG/DL (ref 1.6–2.6)
MCH RBC QN AUTO: 25.4 PG (ref 27–31)
MCHC RBC AUTO-ENTMCNC: 29.7 G/DL (ref 32–36)
MCV RBC AUTO: 85 FL (ref 82–98)
MONOCYTES # BLD AUTO: 0.8 K/UL (ref 0.3–1)
MONOCYTES NFR BLD: 6.8 % (ref 4–15)
NEUTROPHILS # BLD AUTO: 9.8 K/UL (ref 1.8–7.7)
NEUTROPHILS NFR BLD: 83.2 % (ref 38–73)
NRBC BLD-RTO: 0 /100 WBC
PHOSPHATE SERPL-MCNC: 4.3 MG/DL (ref 2.7–4.5)
PLATELET # BLD AUTO: 166 K/UL (ref 150–350)
PMV BLD AUTO: 13.8 FL (ref 9.2–12.9)
POTASSIUM SERPL-SCNC: 4.6 MMOL/L (ref 3.5–5.1)
RBC # BLD AUTO: 4.45 M/UL (ref 4.6–6.2)
SODIUM SERPL-SCNC: 141 MMOL/L (ref 136–145)
TACROLIMUS BLD-MCNC: 12.4 NG/ML (ref 5–15)
WBC # BLD AUTO: 11.74 K/UL (ref 3.9–12.7)

## 2019-11-19 PROCEDURE — 80197 ASSAY OF TACROLIMUS: CPT

## 2019-11-19 PROCEDURE — 85025 COMPLETE CBC W/AUTO DIFF WBC: CPT

## 2019-11-19 PROCEDURE — 36415 COLL VENOUS BLD VENIPUNCTURE: CPT

## 2019-11-19 PROCEDURE — 83735 ASSAY OF MAGNESIUM: CPT

## 2019-11-19 PROCEDURE — 80069 RENAL FUNCTION PANEL: CPT

## 2019-11-19 NOTE — PROGRESS NOTES
Cr on higher end but Prograf level elevated --> if true 12 hour trough decrease Prograf from 4/3 to 3/2. Repeat labs next week.    If not true trough then please encourage hydration and arrange for repeat labs this week

## 2019-11-19 NOTE — TELEPHONE ENCOUNTER
Spoke to pt, reviewed labs and Dr Chairez's instructions, pt will decrease to 3/2, pt verbalized understanding and agreed.   Repeat labs scheduled for 11/26/19.  ----- Message from Layne Chairez MD sent at 11/19/2019  4:03 PM CST -----  Cr on higher end but Prograf level elevated --> if true 12 hour trough decrease Prograf from 4/3 to 3/2. Repeat labs next week.    If not true trough then please encourage hydration and arrange for repeat labs this week

## 2019-11-22 ENCOUNTER — OFFICE VISIT (OUTPATIENT)
Dept: NEPHROLOGY | Facility: CLINIC | Age: 64
End: 2019-11-22
Payer: COMMERCIAL

## 2019-11-22 VITALS
WEIGHT: 227.5 LBS | OXYGEN SATURATION: 99 % | HEIGHT: 73 IN | HEART RATE: 58 BPM | DIASTOLIC BLOOD PRESSURE: 78 MMHG | BODY MASS INDEX: 30.15 KG/M2 | SYSTOLIC BLOOD PRESSURE: 136 MMHG

## 2019-11-22 DIAGNOSIS — Z94.0 KIDNEY TRANSPLANT RECIPIENT: ICD-10-CM

## 2019-11-22 DIAGNOSIS — I50.30 HEART FAILURE WITH PRESERVED EJECTION FRACTION, UNSPECIFIED HF CHRONICITY: ICD-10-CM

## 2019-11-22 DIAGNOSIS — Z86.79 HISTORY OF PERICARDITIS: ICD-10-CM

## 2019-11-22 DIAGNOSIS — M10.379 ACUTE GOUT DUE TO RENAL IMPAIRMENT INVOLVING FOOT, UNSPECIFIED LATERALITY: ICD-10-CM

## 2019-11-22 DIAGNOSIS — I13.0 HYPERTENSIVE HEART AND RENAL DISEASE WITH RENAL FAILURE, STAGE 1 THROUGH STAGE 4 OR UNSPECIFIED CHRONIC KIDNEY DISEASE, WITH HEART FAILURE: ICD-10-CM

## 2019-11-22 DIAGNOSIS — I48.0 PAROXYSMAL ATRIAL FIBRILLATION: ICD-10-CM

## 2019-11-22 DIAGNOSIS — N18.30 CKD (CHRONIC KIDNEY DISEASE) STAGE 3, GFR 30-59 ML/MIN: Primary | ICD-10-CM

## 2019-11-22 DIAGNOSIS — I25.10 CORONARY ARTERY DISEASE INVOLVING NATIVE CORONARY ARTERY OF NATIVE HEART WITHOUT ANGINA PECTORIS: ICD-10-CM

## 2019-11-22 DIAGNOSIS — N25.81 SECONDARY RENAL HYPERPARATHYROIDISM: ICD-10-CM

## 2019-11-22 DIAGNOSIS — Z94.0 H/O KIDNEY TRANSPLANT: ICD-10-CM

## 2019-11-22 DIAGNOSIS — E11.9 TYPE 2 DIABETES MELLITUS WITHOUT COMPLICATION, WITHOUT LONG-TERM CURRENT USE OF INSULIN: ICD-10-CM

## 2019-11-22 DIAGNOSIS — E78.2 MIXED HYPERLIPIDEMIA: ICD-10-CM

## 2019-11-22 PROCEDURE — 3078F PR MOST RECENT DIASTOLIC BLOOD PRESSURE < 80 MM HG: ICD-10-PCS | Mod: CPTII,S$GLB,, | Performed by: INTERNAL MEDICINE

## 2019-11-22 PROCEDURE — 3075F PR MOST RECENT SYSTOLIC BLOOD PRESS GE 130-139MM HG: ICD-10-PCS | Mod: CPTII,S$GLB,, | Performed by: INTERNAL MEDICINE

## 2019-11-22 PROCEDURE — 3078F DIAST BP <80 MM HG: CPT | Mod: CPTII,S$GLB,, | Performed by: INTERNAL MEDICINE

## 2019-11-22 PROCEDURE — 1101F PT FALLS ASSESS-DOCD LE1/YR: CPT | Mod: CPTII,S$GLB,, | Performed by: INTERNAL MEDICINE

## 2019-11-22 PROCEDURE — 3008F BODY MASS INDEX DOCD: CPT | Mod: CPTII,S$GLB,, | Performed by: INTERNAL MEDICINE

## 2019-11-22 PROCEDURE — 3044F PR MOST RECENT HEMOGLOBIN A1C LEVEL <7.0%: ICD-10-PCS | Mod: CPTII,S$GLB,, | Performed by: INTERNAL MEDICINE

## 2019-11-22 PROCEDURE — 3008F PR BODY MASS INDEX (BMI) DOCUMENTED: ICD-10-PCS | Mod: CPTII,S$GLB,, | Performed by: INTERNAL MEDICINE

## 2019-11-22 PROCEDURE — 99999 PR PBB SHADOW E&M-EST. PATIENT-LVL III: CPT | Mod: PBBFAC,,, | Performed by: INTERNAL MEDICINE

## 2019-11-22 PROCEDURE — 99214 OFFICE O/P EST MOD 30 MIN: CPT | Mod: S$GLB,,, | Performed by: INTERNAL MEDICINE

## 2019-11-22 PROCEDURE — 3075F SYST BP GE 130 - 139MM HG: CPT | Mod: CPTII,S$GLB,, | Performed by: INTERNAL MEDICINE

## 2019-11-22 PROCEDURE — 3044F HG A1C LEVEL LT 7.0%: CPT | Mod: CPTII,S$GLB,, | Performed by: INTERNAL MEDICINE

## 2019-11-22 PROCEDURE — 99214 PR OFFICE/OUTPT VISIT, EST, LEVL IV, 30-39 MIN: ICD-10-PCS | Mod: S$GLB,,, | Performed by: INTERNAL MEDICINE

## 2019-11-22 PROCEDURE — 1101F PR PT FALLS ASSESS DOC 0-1 FALLS W/OUT INJ PAST YR: ICD-10-PCS | Mod: CPTII,S$GLB,, | Performed by: INTERNAL MEDICINE

## 2019-11-22 PROCEDURE — 99999 PR PBB SHADOW E&M-EST. PATIENT-LVL III: ICD-10-PCS | Mod: PBBFAC,,, | Performed by: INTERNAL MEDICINE

## 2019-11-22 RX ORDER — COLCHICINE 0.6 MG/1
TABLET ORAL
Qty: 30 TABLET | Refills: 11 | Status: SHIPPED | OUTPATIENT
Start: 2019-11-22 | End: 2020-10-19

## 2019-11-22 RX ORDER — AMOXICILLIN 500 MG/1
500 CAPSULE ORAL 2 TIMES DAILY WITH MEALS
Qty: 14 CAPSULE | Refills: 0 | Status: SHIPPED | OUTPATIENT
Start: 2019-11-22 | End: 2019-11-29

## 2019-11-25 LAB — BACTERIA SPEC ANAEROBE CULT: NORMAL

## 2019-11-26 ENCOUNTER — LAB VISIT (OUTPATIENT)
Dept: LAB | Facility: HOSPITAL | Age: 64
End: 2019-11-26
Attending: INTERNAL MEDICINE
Payer: COMMERCIAL

## 2019-11-26 DIAGNOSIS — Z94.0 KIDNEY REPLACED BY TRANSPLANT: ICD-10-CM

## 2019-11-26 LAB
ALBUMIN SERPL BCP-MCNC: 3.3 G/DL (ref 3.5–5.2)
ANION GAP SERPL CALC-SCNC: 8 MMOL/L (ref 8–16)
BASOPHILS # BLD AUTO: 0.03 K/UL (ref 0–0.2)
BASOPHILS NFR BLD: 0.4 % (ref 0–1.9)
BUN SERPL-MCNC: 24 MG/DL (ref 8–23)
CALCIUM SERPL-MCNC: 8.1 MG/DL (ref 8.7–10.5)
CHLORIDE SERPL-SCNC: 108 MMOL/L (ref 95–110)
CO2 SERPL-SCNC: 27 MMOL/L (ref 23–29)
CREAT SERPL-MCNC: 2.3 MG/DL (ref 0.5–1.4)
DIFFERENTIAL METHOD: ABNORMAL
EOSINOPHIL # BLD AUTO: 0.3 K/UL (ref 0–0.5)
EOSINOPHIL NFR BLD: 3.7 % (ref 0–8)
ERYTHROCYTE [DISTWIDTH] IN BLOOD BY AUTOMATED COUNT: 14 % (ref 11.5–14.5)
EST. GFR  (AFRICAN AMERICAN): 33.2 ML/MIN/1.73 M^2
EST. GFR  (NON AFRICAN AMERICAN): 28.7 ML/MIN/1.73 M^2
GLUCOSE SERPL-MCNC: 105 MG/DL (ref 70–110)
HCT VFR BLD AUTO: 36.9 % (ref 40–54)
HGB BLD-MCNC: 11 G/DL (ref 14–18)
IMM GRANULOCYTES # BLD AUTO: 0.07 K/UL (ref 0–0.04)
IMM GRANULOCYTES NFR BLD AUTO: 0.9 % (ref 0–0.5)
LYMPHOCYTES # BLD AUTO: 1.6 K/UL (ref 1–4.8)
LYMPHOCYTES NFR BLD: 21.5 % (ref 18–48)
MCH RBC QN AUTO: 25.5 PG (ref 27–31)
MCHC RBC AUTO-ENTMCNC: 29.8 G/DL (ref 32–36)
MCV RBC AUTO: 85 FL (ref 82–98)
MONOCYTES # BLD AUTO: 0.9 K/UL (ref 0.3–1)
MONOCYTES NFR BLD: 11.9 % (ref 4–15)
NEUTROPHILS # BLD AUTO: 4.7 K/UL (ref 1.8–7.7)
NEUTROPHILS NFR BLD: 61.6 % (ref 38–73)
NRBC BLD-RTO: 0 /100 WBC
PHOSPHATE SERPL-MCNC: 3.4 MG/DL (ref 2.7–4.5)
PLATELET # BLD AUTO: 147 K/UL (ref 150–350)
PMV BLD AUTO: 13.2 FL (ref 9.2–12.9)
POTASSIUM SERPL-SCNC: 4.1 MMOL/L (ref 3.5–5.1)
RBC # BLD AUTO: 4.32 M/UL (ref 4.6–6.2)
SODIUM SERPL-SCNC: 143 MMOL/L (ref 136–145)
TACROLIMUS BLD-MCNC: 7.4 NG/ML (ref 5–15)
WBC # BLD AUTO: 7.63 K/UL (ref 3.9–12.7)

## 2019-11-26 PROCEDURE — 85025 COMPLETE CBC W/AUTO DIFF WBC: CPT

## 2019-11-26 PROCEDURE — 36415 COLL VENOUS BLD VENIPUNCTURE: CPT

## 2019-11-26 PROCEDURE — 80197 ASSAY OF TACROLIMUS: CPT

## 2019-11-26 PROCEDURE — 80069 RENAL FUNCTION PANEL: CPT

## 2019-11-27 ENCOUNTER — TELEPHONE (OUTPATIENT)
Dept: TRANSPLANT | Facility: CLINIC | Age: 64
End: 2019-11-27

## 2019-11-27 DIAGNOSIS — Z94.0 KIDNEY REPLACED BY TRANSPLANT: Primary | ICD-10-CM

## 2019-11-27 RX ORDER — TACROLIMUS 1 MG/1
CAPSULE ORAL
Qty: 150 CAPSULE | Refills: 11 | Status: SHIPPED | OUTPATIENT
Start: 2019-11-27 | End: 2019-12-09 | Stop reason: SDUPTHER

## 2019-11-27 NOTE — TELEPHONE ENCOUNTER
Spoke to pt, reviewed labs and Dr Chairez's instructions, pt confirms 12hr level, also confirms that he is already taking 3/3, will decrease to 3/2 and repeat labs on weds 12/04/19.    ----- Message from Layne Chairez MD sent at 11/27/2019  1:30 PM CST -----  Prograf still on higher end --> if true 12 hour trough decrease Prograf from 4/3 to 3 mg BID. Cr remains on higher end - query whether related to CNI effect. Repeat labs next week and if Cr remains higher than baseline would arrange for renal allograft doppler

## 2019-11-27 NOTE — PROGRESS NOTES
Prograf still on higher end --> if true 12 hour trough decrease Prograf from 4/3 to 3 mg BID. Cr remains on higher end - query whether related to CNI effect. Repeat labs next week and if Cr remains higher than baseline would arrange for renal allograft doppler

## 2019-12-01 ENCOUNTER — PATIENT OUTREACH (OUTPATIENT)
Dept: ADMINISTRATIVE | Facility: OTHER | Age: 64
End: 2019-12-01

## 2019-12-04 ENCOUNTER — OFFICE VISIT (OUTPATIENT)
Dept: CARDIOLOGY | Facility: CLINIC | Age: 64
End: 2019-12-04
Payer: COMMERCIAL

## 2019-12-04 ENCOUNTER — LAB VISIT (OUTPATIENT)
Dept: LAB | Facility: HOSPITAL | Age: 64
End: 2019-12-04
Attending: INTERNAL MEDICINE
Payer: COMMERCIAL

## 2019-12-04 ENCOUNTER — TELEPHONE (OUTPATIENT)
Dept: TRANSPLANT | Facility: CLINIC | Age: 64
End: 2019-12-04

## 2019-12-04 VITALS
WEIGHT: 224.63 LBS | HEART RATE: 58 BPM | SYSTOLIC BLOOD PRESSURE: 151 MMHG | BODY MASS INDEX: 29.77 KG/M2 | HEIGHT: 73 IN | DIASTOLIC BLOOD PRESSURE: 71 MMHG

## 2019-12-04 DIAGNOSIS — Z94.0 H/O KIDNEY TRANSPLANT: ICD-10-CM

## 2019-12-04 DIAGNOSIS — E78.2 MIXED HYPERLIPIDEMIA: ICD-10-CM

## 2019-12-04 DIAGNOSIS — I47.10 SUPRAVENTRICULAR TACHYCARDIA: ICD-10-CM

## 2019-12-04 DIAGNOSIS — I25.10 CORONARY ARTERY DISEASE INVOLVING NATIVE CORONARY ARTERY OF NATIVE HEART WITHOUT ANGINA PECTORIS: ICD-10-CM

## 2019-12-04 DIAGNOSIS — Z94.0 KIDNEY REPLACED BY TRANSPLANT: ICD-10-CM

## 2019-12-04 DIAGNOSIS — Z72.0 TOBACCO USE: ICD-10-CM

## 2019-12-04 DIAGNOSIS — I48.0 PAROXYSMAL ATRIAL FIBRILLATION: ICD-10-CM

## 2019-12-04 DIAGNOSIS — I50.32 CHRONIC HEART FAILURE WITH PRESERVED EJECTION FRACTION: Primary | ICD-10-CM

## 2019-12-04 DIAGNOSIS — N18.30 CKD (CHRONIC KIDNEY DISEASE) STAGE 3, GFR 30-59 ML/MIN: ICD-10-CM

## 2019-12-04 LAB
ALBUMIN SERPL BCP-MCNC: 3.4 G/DL (ref 3.5–5.2)
ANION GAP SERPL CALC-SCNC: 8 MMOL/L (ref 8–16)
BASOPHILS # BLD AUTO: 0.04 K/UL (ref 0–0.2)
BASOPHILS NFR BLD: 0.5 % (ref 0–1.9)
BUN SERPL-MCNC: 29 MG/DL (ref 8–23)
CALCIUM SERPL-MCNC: 8.9 MG/DL (ref 8.7–10.5)
CHLORIDE SERPL-SCNC: 109 MMOL/L (ref 95–110)
CO2 SERPL-SCNC: 26 MMOL/L (ref 23–29)
CREAT SERPL-MCNC: 2 MG/DL (ref 0.5–1.4)
DIFFERENTIAL METHOD: ABNORMAL
EOSINOPHIL # BLD AUTO: 0.7 K/UL (ref 0–0.5)
EOSINOPHIL NFR BLD: 8.3 % (ref 0–8)
ERYTHROCYTE [DISTWIDTH] IN BLOOD BY AUTOMATED COUNT: 14.1 % (ref 11.5–14.5)
EST. GFR  (AFRICAN AMERICAN): 39.3 ML/MIN/1.73 M^2
EST. GFR  (NON AFRICAN AMERICAN): 34 ML/MIN/1.73 M^2
GLUCOSE SERPL-MCNC: 106 MG/DL (ref 70–110)
HCT VFR BLD AUTO: 38.4 % (ref 40–54)
HGB BLD-MCNC: 11.3 G/DL (ref 14–18)
IMM GRANULOCYTES # BLD AUTO: 0.06 K/UL (ref 0–0.04)
IMM GRANULOCYTES NFR BLD AUTO: 0.8 % (ref 0–0.5)
LYMPHOCYTES # BLD AUTO: 1.5 K/UL (ref 1–4.8)
LYMPHOCYTES NFR BLD: 18.7 % (ref 18–48)
MCH RBC QN AUTO: 25.3 PG (ref 27–31)
MCHC RBC AUTO-ENTMCNC: 29.4 G/DL (ref 32–36)
MCV RBC AUTO: 86 FL (ref 82–98)
MONOCYTES # BLD AUTO: 0.7 K/UL (ref 0.3–1)
MONOCYTES NFR BLD: 8.7 % (ref 4–15)
NEUTROPHILS # BLD AUTO: 5 K/UL (ref 1.8–7.7)
NEUTROPHILS NFR BLD: 63 % (ref 38–73)
NRBC BLD-RTO: 0 /100 WBC
PHOSPHATE SERPL-MCNC: 4.3 MG/DL (ref 2.7–4.5)
PLATELET # BLD AUTO: 137 K/UL (ref 150–350)
PMV BLD AUTO: 13.3 FL (ref 9.2–12.9)
POTASSIUM SERPL-SCNC: 3.7 MMOL/L (ref 3.5–5.1)
RBC # BLD AUTO: 4.46 M/UL (ref 4.6–6.2)
SODIUM SERPL-SCNC: 143 MMOL/L (ref 136–145)
TACROLIMUS BLD-MCNC: 5.8 NG/ML (ref 5–15)
WBC # BLD AUTO: 7.97 K/UL (ref 3.9–12.7)

## 2019-12-04 PROCEDURE — 3078F DIAST BP <80 MM HG: CPT | Mod: CPTII,S$GLB,, | Performed by: INTERNAL MEDICINE

## 2019-12-04 PROCEDURE — 3078F PR MOST RECENT DIASTOLIC BLOOD PRESSURE < 80 MM HG: ICD-10-PCS | Mod: CPTII,S$GLB,, | Performed by: INTERNAL MEDICINE

## 2019-12-04 PROCEDURE — 99214 OFFICE O/P EST MOD 30 MIN: CPT | Mod: S$GLB,,, | Performed by: INTERNAL MEDICINE

## 2019-12-04 PROCEDURE — 99999 PR PBB SHADOW E&M-EST. PATIENT-LVL III: CPT | Mod: PBBFAC,,, | Performed by: INTERNAL MEDICINE

## 2019-12-04 PROCEDURE — 3077F PR MOST RECENT SYSTOLIC BLOOD PRESSURE >= 140 MM HG: ICD-10-PCS | Mod: CPTII,S$GLB,, | Performed by: INTERNAL MEDICINE

## 2019-12-04 PROCEDURE — 36415 COLL VENOUS BLD VENIPUNCTURE: CPT

## 2019-12-04 PROCEDURE — 3008F PR BODY MASS INDEX (BMI) DOCUMENTED: ICD-10-PCS | Mod: CPTII,S$GLB,, | Performed by: INTERNAL MEDICINE

## 2019-12-04 PROCEDURE — 1101F PR PT FALLS ASSESS DOC 0-1 FALLS W/OUT INJ PAST YR: ICD-10-PCS | Mod: CPTII,S$GLB,, | Performed by: INTERNAL MEDICINE

## 2019-12-04 PROCEDURE — 3077F SYST BP >= 140 MM HG: CPT | Mod: CPTII,S$GLB,, | Performed by: INTERNAL MEDICINE

## 2019-12-04 PROCEDURE — 3008F BODY MASS INDEX DOCD: CPT | Mod: CPTII,S$GLB,, | Performed by: INTERNAL MEDICINE

## 2019-12-04 PROCEDURE — 99214 PR OFFICE/OUTPT VISIT, EST, LEVL IV, 30-39 MIN: ICD-10-PCS | Mod: S$GLB,,, | Performed by: INTERNAL MEDICINE

## 2019-12-04 PROCEDURE — 99999 PR PBB SHADOW E&M-EST. PATIENT-LVL III: ICD-10-PCS | Mod: PBBFAC,,, | Performed by: INTERNAL MEDICINE

## 2019-12-04 PROCEDURE — 80197 ASSAY OF TACROLIMUS: CPT

## 2019-12-04 PROCEDURE — 85025 COMPLETE CBC W/AUTO DIFF WBC: CPT

## 2019-12-04 PROCEDURE — 80069 RENAL FUNCTION PANEL: CPT

## 2019-12-04 PROCEDURE — 1101F PT FALLS ASSESS-DOCD LE1/YR: CPT | Mod: CPTII,S$GLB,, | Performed by: INTERNAL MEDICINE

## 2019-12-04 NOTE — PROGRESS NOTES
Subjective:   Patient ID:  Ibrahima Phelps is a 64 y.o. male who presents for follow-up of Hypertension, unspecified type (6 months fu)      HPI: He is getting over a cold. He has not had any recurrence of SVT. Ibrahima Phelps denies any chest pain, shortness of breath, PND, orthopnea, palpitations, leg edema, or syncope. He is still smoking. He is active, walking the mall with his wife. He had a SPECT 6/19 which was negative for ischemia. An echo done 6/19 showed an LVEF of 55%.    ECG: (6/17/19) Sinus bradycardia 50 bpm    Past Medical History:   Diagnosis Date    (HFpEF) heart failure with preserved ejection fraction 9/25/2017    Atrial fibrillation     CAD (coronary artery disease)     CKD (chronic kidney disease) stage 3, GFR 30-59 ml/min     Dr. Latif    Diverticulosis     Fever blister     Heart attack 2006    Hyperlipidemia     Hypertension     Immunodeficiency due to treatment with immunosuppressive medication     Keloid cicatrix     Metabolic bone disease     Pericarditis     S/P kidney transplant     Supraventricular tachycardia 06/2019    Thrombocytopenia     Thyroid disease     Unspecified disorder of kidney and ureter     Stage IIICKD       Past Surgical History:   Procedure Laterality Date    CARDIOVERSION  04/30/15    CHOLECYSTECTOMY      COLONOSCOPY  April 20, 2011    Diverticulosis, repeat recommended in 3 yrs., repeat colonoscopy 2014 revealed 2 polyps.  He should return in 5 years.    CORONARY ANGIOPLASTY WITH STENT PLACEMENT  9/13/2006    KIDNEY TRANSPLANT  2005    PARATHYROIDECTOMY         Social History     Socioeconomic History    Marital status:      Spouse name: Not on file    Number of children: Not on file    Years of education: Not on file    Highest education level: Not on file   Occupational History    Occupation:    Social Needs    Financial resource strain: Not on file    Food insecurity:     Worry: Not on file     Inability: Not on  file    Transportation needs:     Medical: Not on file     Non-medical: Not on file   Tobacco Use    Smoking status: Current Some Day Smoker     Packs/day: 0.50     Years: 40.00     Pack years: 20.00     Types: Cigarettes    Smokeless tobacco: Never Used    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   Substance and Sexual Activity    Alcohol use: No    Drug use: No    Sexual activity: Yes     Partners: Female   Lifestyle    Physical activity:     Days per week: Not on file     Minutes per session: Not on file    Stress: Not on file   Relationships    Social connections:     Talks on phone: Not on file     Gets together: Not on file     Attends [employer] service: Not on file     Active member of club or organization: Not on file     Attends meetings of clubs or organizations: Not on file     Relationship status: Not on file   Other Topics Concern    Not on file   Social History Narrative    Retired        Family History   Problem Relation Age of Onset    No Known Problems Sister     No Known Problems Brother     Thyroid disease Mother         s/p surgery    Heart disease Father         had pacemaker    No Known Problems Sister     Kidney failure Sister     Kidney disease Sister     No Known Problems Sister     Kidney disease Brother     Kidney failure Brother         s/p transplant    Diabetes Mellitus Neg Hx     Stroke Neg Hx     Heart attack Neg Hx     Cancer Neg Hx     Celiac disease Neg Hx     Cirrhosis Neg Hx     Colon cancer Neg Hx     Esophageal cancer Neg Hx     Inflammatory bowel disease Neg Hx     Rectal cancer Neg Hx     Stomach cancer Neg Hx     Ulcerative colitis Neg Hx     Liver disease Neg Hx     Liver cancer Neg Hx     Crohn's disease Neg Hx     Melanoma Neg Hx        Patient's Medications   New Prescriptions    No medications on file   Previous Medications    ACETAMINOPHEN (TYLENOL) 325 MG TABLET     Take 2 tablets (650 mg total) by mouth every 4 (four) hours as needed.    ALBUTEROL (ACCUNEB) 1.25 MG/3 ML NEBU    Take 3 mLs (1.25 mg total) by nebulization every 6 (six) hours as needed. Rescue    ASPIRIN (ADULT ASPIRIN EC LOW STRENGTH) 81 MG EC TABLET    Take 1 tablet by mouth Daily.    ATORVASTATIN (LIPITOR) 10 MG TABLET    Take 1 tablet (10 mg total) by mouth once daily.    ATORVASTATIN (LIPITOR) 10 MG TABLET    TAKE 1 TABLET (10 MG TOTAL) BY MOUTH ONCE DAILY.    CALCIUM CARBONATE (OS-TRISH) 500 MG CALCIUM (1,250 MG) TABLET    Two by mouth twice a day    CARVEDILOL (COREG) 25 MG TABLET    Take 1 tablet (25 mg total) by mouth 2 (two) times daily with meals.    COLCHICINE (COLCRYS) 0.6 MG TABLET    One po BID up to three days prn gout flare    DOXAZOSIN (CARDURA) 4 MG TABLET    Take 1 tablet (4 mg total) by mouth every 12 (twelve) hours.    FEXOFENADINE (ALLEGRA) 60 MG TABLET    Take 1 tablet by mouth Twice daily as needed.    FLUTICASONE PROPIONATE (FLONASE) 50 MCG/ACTUATION NASAL SPRAY    1 spray (50 mcg total) by Each Nostril route once daily.    FUROSEMIDE (LASIX) 40 MG TABLET    Take 0.5 tablets (20 mg total) by mouth 2 (two) times daily.    GABAPENTIN (NEURONTIN) 300 MG CAPSULE    1 po q hs x 3 days, then 1 po bid x 3 days, then 1 po tid.    MULTIVITAMIN (THERAGRAN) PER TABLET    Take 1 tablet by mouth Daily.    MYCOPHENOLATE (CELLCEPT) 250 MG CAP    TAKE 2 CAPSULES BY MOUTH  EVERY 12 HOURS    NICOTINE (NICODERM CQ) 21 MG/24 HR    Place 1 patch onto the skin once daily.    NIFEDIPINE (PROCARDIA-XL) 90 MG (OSM) 24 HR TABLET    Take 1 tablet (90 mg total) by mouth once daily.    PARICALCITOL (ZEMPLAR) 1 MCG CAPSULE    One po MWF    POTASSIUM CHLORIDE (KLOR-CON) 10 MEQ TBSR    Take 1 tablet (10 mEq total) by mouth once daily.    PREDNISONE (DELTASONE) 5 MG TABLET    Take 1 tablet (5 mg total) by mouth every morning.    RANITIDINE (ZANTAC) 150 MG TABLET    Take 1 tablet (150 mg total) by mouth 2 (two) times  daily.    SPIRONOLACTONE (ALDACTONE) 25 MG TABLET    Take 1 tablet (25 mg total) by mouth once daily.    TACROLIMUS (PROGRAF) 1 MG CAP    TAKE 3 CAPSULES BY MOUTH EVERY MORNING AND 2 CAPSULES EVERY EVENING    TRIAMCINOLONE ACETONIDE 0.1% (KENALOG) 0.1 % CREAM    AAA bid to rash on ankles    VITAMIN D 1000 UNITS TAB    Take 370 mg by mouth 2 (two) times daily. 2 tablets BID    WARFARIN (COUMADIN) 5 MG TABLET    Take 1 tablet (5 mg total) by mouth Daily. Or as directed by [facility]   Modified Medications    No medications on file   Discontinued Medications    No medications on file       Review of Systems   Constitution: Negative for malaise/fatigue and weight gain.   HENT: Positive for congestion. Negative for hearing loss.    Eyes: Negative for visual disturbance.   Cardiovascular: Negative for chest pain, claudication, dyspnea on exertion, leg swelling, near-syncope, orthopnea, palpitations, paroxysmal nocturnal dyspnea and syncope.   Respiratory: Negative for cough, shortness of breath, sleep disturbances due to breathing, snoring and wheezing.    Endocrine: Negative for cold intolerance, heat intolerance, polydipsia, polyphagia and polyuria.   Hematologic/Lymphatic: Negative for bleeding problem. Does not bruise/bleed easily.   Skin: Negative for rash and suspicious lesions.   Musculoskeletal: Negative for arthritis, falls, joint pain, muscle weakness and myalgias.   Gastrointestinal: Negative for abdominal pain, change in bowel habit, constipation, diarrhea, heartburn, hematochezia, melena and nausea.   Genitourinary: Negative for hematuria and nocturia.   Neurological: Negative for excessive daytime sleepiness, dizziness, headaches, light-headedness, loss of balance and weakness.   Psychiatric/Behavioral: Negative for depression. The patient is not nervous/anxious.    Allergic/Immunologic: Negative for environmental allergies.       BP (!) 151/71 (BP Location: Left arm, Patient Position: Sitting, BP  "Method: Large (Automatic))   Pulse (!) 58   Ht 6' 1" (1.854 m)   Wt 101.9 kg (224 lb 10.4 oz)   BMI 29.64 kg/m²     Objective:   Physical Exam   Constitutional: He is oriented to person, place, and time. He appears well-developed and well-nourished.        HENT:   Head: Normocephalic and atraumatic.   Mouth/Throat: Oropharynx is clear and moist.   Eyes: Pupils are equal, round, and reactive to light. Conjunctivae and EOM are normal. No scleral icterus.   Neck: Normal range of motion. Neck supple. No hepatojugular reflux and no JVD present. No tracheal deviation present. No thyromegaly present.   Cardiovascular: Normal rate, regular rhythm, normal heart sounds and intact distal pulses. Frequent extrasystoles are present. PMI is not displaced.   Pulses:       Carotid pulses are 2+ on the right side, and 2+ on the left side.       Radial pulses are 2+ on the right side, and 0 on the left side.        Dorsalis pedis pulses are 2+ on the right side, and 2+ on the left side.        Posterior tibial pulses are 2+ on the right side, and 2+ on the left side.   Pulmonary/Chest: Effort normal and breath sounds normal.   Abdominal: Soft. Bowel sounds are normal. He exhibits no distension and no mass. There is no hepatosplenomegaly. There is no tenderness.   Musculoskeletal: He exhibits no edema or tenderness.   Lymphadenopathy:     He has no cervical adenopathy.   Neurological: He is alert and oriented to person, place, and time.   Skin: Skin is warm and dry. No rash noted. No cyanosis or erythema. Nails show no clubbing.   Psychiatric: He has a normal mood and affect. His speech is normal and behavior is normal.       Lab Results   Component Value Date     12/04/2019    K 3.7 12/04/2019     12/04/2019    CO2 26 12/04/2019    BUN 29 (H) 12/04/2019    CREATININE 2.0 (H) 12/04/2019     12/04/2019    HGBA1C 5.9 (H) 11/15/2019    MG 2.0 11/19/2019    AST 19 11/16/2019    ALT 17 11/16/2019    ALBUMIN 3.4 (L) " 12/04/2019    PROT 7.3 11/16/2019    BILITOT 0.3 11/16/2019    WBC 7.97 12/04/2019    HGB 11.3 (L) 12/04/2019    HCT 38.4 (L) 12/04/2019    MCV 86 12/04/2019     (L) 12/04/2019    INR 1.7 (H) 11/16/2019    TSH 1.434 02/02/2019    CHOL 130 04/12/2019    HDL 39 (L) 04/12/2019    LDLCALC 69.4 04/12/2019    TRIG 108 04/12/2019     (H) 06/15/2019       Assessment:     1. Chronic heart failure with preserved ejection fraction : He appears well compensated and euvolemic.   2. Paroxysmal atrial fibrillation : CYP1Hb5-Kgzd is 4. He has been maintaining sinus rhythm. He is anticoagulated with coumadin.   3. Coronary artery disease involving native coronary artery of native heart without angina pectoris : He is asymptomatic with preserved LVEF. Continue asa and high intensity statin therapy. Recent stress test was negative for ischemia.   4. Mixed hyperlipidemia : Lipids are at goal. Continue statin therapy.   5. Supraventricular tachycardia : No recurrence. Continue carvedilol.   6. H/O kidney transplant    7. Tobacco use : Smoking cessation recommended.   8. CKD (chronic kidney disease) stage 3, GFR 30-59 ml/min        Plan:     Ibrahima was seen today for hypertension, unspecified type.    Diagnoses and all orders for this visit:    Chronic heart failure with preserved ejection fraction  -     Comprehensive metabolic panel; Future  -     Lipid panel; Future    Paroxysmal atrial fibrillation  -     Comprehensive metabolic panel; Future  -     Lipid panel; Future    Coronary artery disease involving native coronary artery of native heart without angina pectoris  -     Comprehensive metabolic panel; Future  -     Lipid panel; Future    Mixed hyperlipidemia  -     Comprehensive metabolic panel; Future  -     Lipid panel; Future    Supraventricular tachycardia  -     Comprehensive metabolic panel; Future  -     Lipid panel; Future    H/O kidney transplant    Tobacco use    CKD (chronic kidney disease) stage 3, GFR  30-59 ml/min        Thank you for allowing me to participate in this patient's care. Please do not hesitate to contact me with any questions or concerns.

## 2019-12-04 NOTE — TELEPHONE ENCOUNTER
Spoke to pt, reviewed labs, no changes at present time.  Pt confirms that he is taking prograf 2mg BID.  ----- Message from Layne Chairez MD sent at 12/4/2019  2:37 PM CST -----  Available results reviewed and require no immediate action.

## 2019-12-05 ENCOUNTER — PATIENT MESSAGE (OUTPATIENT)
Dept: NEPHROLOGY | Facility: CLINIC | Age: 64
End: 2019-12-05

## 2019-12-09 DIAGNOSIS — Z94.0 KIDNEY REPLACED BY TRANSPLANT: ICD-10-CM

## 2019-12-09 RX ORDER — AZITHROMYCIN 250 MG/1
TABLET, FILM COATED ORAL
Qty: 6 TABLET | Refills: 0 | Status: SHIPPED | OUTPATIENT
Start: 2019-12-09 | End: 2019-12-14

## 2019-12-09 RX ORDER — TACROLIMUS 1 MG/1
2 CAPSULE ORAL EVERY 12 HOURS
Qty: 120 CAPSULE | Refills: 11 | Status: SHIPPED | OUTPATIENT
Start: 2019-12-09 | End: 2020-01-21 | Stop reason: SDUPTHER

## 2019-12-09 NOTE — TELEPHONE ENCOUNTER
Received phone call from patient C/O out of Tacrolimus took his last dose last night and has not received his mail order yet. He sates he spoke with [facility] and his mail orders should arrive tomorrow.  3 day supply of Tacrolimus phone into [facility] per patient request.  Patient advise to keep coordinator posted.

## 2019-12-09 NOTE — TELEPHONE ENCOUNTER
----- Message from Bryn Garcia RN sent at 12/9/2019  8:52 AM CST -----      ----- Message -----  From: Mariel De León  Sent: 12/9/2019   8:22 AM CST  To: [facility] Trans[facility] Clinical    Pt is calling about the status of his medication pt stated where he receives his mail order stating he was suppose to receive the medication Saturday pt still has yet to receive medication       tacrolimus (PROGRAF) 1 MG Cap    Pt contact [phone]

## 2019-12-10 ENCOUNTER — TELEPHONE (OUTPATIENT)
Dept: NEPHROLOGY | Facility: CLINIC | Age: 64
End: 2019-12-10

## 2019-12-10 NOTE — TELEPHONE ENCOUNTER
----- Message from Angela Boyd sent at 12/10/2019  9:31 AM CST -----  Contact: patient  Type: Needs Medical Advice  Who Called:  patient  Best Call Back Number: [phone]  Additional Information: Patient states he dropped off papers to office and would like to know if office has faxed the completed forms to number provided.  Please call to advise.Thanks!

## 2019-12-11 NOTE — TELEPHONE ENCOUNTER
Yes, at his clinic visit I filled out a form declaring he is no longer on dialysis. It was then given to staff to be faxed

## 2019-12-13 ENCOUNTER — ANTI-COAG VISIT (OUTPATIENT)
Dept: CARDIOLOGY | Facility: CLINIC | Age: 64
End: 2019-12-13
Payer: COMMERCIAL

## 2019-12-13 DIAGNOSIS — I47.10 SUPRAVENTRICULAR TACHYCARDIA: ICD-10-CM

## 2019-12-13 DIAGNOSIS — Z79.01 LONG TERM (CURRENT) USE OF ANTICOAGULANTS: Primary | ICD-10-CM

## 2019-12-13 LAB — INR PPP: 1.5 (ref 2–3)

## 2019-12-13 PROCEDURE — 93793 ANTICOAG MGMT PT WARFARIN: CPT | Mod: S$GLB,,,

## 2019-12-13 PROCEDURE — 93793 PR ANTICOAGULANT MGMT FOR PT TAKING WARFARIN: ICD-10-PCS | Mod: S$GLB,,,

## 2019-12-13 PROCEDURE — 85610 POCT INR: ICD-10-PCS | Mod: QW,S$GLB,, | Performed by: INTERNAL MEDICINE

## 2019-12-13 PROCEDURE — 85610 PROTHROMBIN TIME: CPT | Mod: QW,S$GLB,, | Performed by: INTERNAL MEDICINE

## 2019-12-13 NOTE — PROGRESS NOTES
INR not at goal. Medications, chart, and patient findings reviewed. See calendar for adjustments to dose and follow up plan.  Findings: Pt states he missed a dose yesterday; he's had cold-like symptoms for 2 weeks for which he was prescribed Amoxil (course finished); he is still congested; & denies any other changes.  Will instruct pt to take 10mg 12/13 & resume maintenance dose.  Plan to re-assess in 3 weeks.   Patient was re-educated on situations that would require placing a call to the Coumadin Clinic, including bleeding or unusual bruising issues, changes in health, diet or medications,upcoming procedures that require warfarin interruption, and missed Coumadin dose(s). Patient expressed understanding that avoidance of consistency with these parameters could cause fluctuations in INR, leading to more frequent visits and increase risk of adverse events.

## 2019-12-19 LAB — FUNGUS SPEC CULT: NORMAL

## 2020-01-03 ENCOUNTER — ANTI-COAG VISIT (OUTPATIENT)
Dept: CARDIOLOGY | Facility: CLINIC | Age: 65
End: 2020-01-03
Payer: MEDICARE

## 2020-01-03 DIAGNOSIS — I47.10 SUPRAVENTRICULAR TACHYCARDIA: ICD-10-CM

## 2020-01-03 DIAGNOSIS — Z79.01 LONG TERM (CURRENT) USE OF ANTICOAGULANTS: Primary | ICD-10-CM

## 2020-01-03 DIAGNOSIS — Z79.01 LONG TERM CURRENT USE OF ANTICOAGULANT THERAPY: ICD-10-CM

## 2020-01-03 DIAGNOSIS — I48.91 ATRIAL FIBRILLATION WITH RAPID VENTRICULAR RESPONSE: ICD-10-CM

## 2020-01-03 LAB — INR PPP: 2 (ref 2–3)

## 2020-01-03 PROCEDURE — 93793 PR ANTICOAGULANT MGMT FOR PT TAKING WARFARIN: ICD-10-PCS | Mod: S$GLB,,,

## 2020-01-03 PROCEDURE — 93793 ANTICOAG MGMT PT WARFARIN: CPT | Mod: S$GLB,,,

## 2020-01-03 PROCEDURE — 85610 PROTHROMBIN TIME: CPT | Mod: PBBFAC

## 2020-01-18 LAB
ACID FAST MOD KINY STN SPEC: NORMAL
MYCOBACTERIUM SPEC QL CULT: NORMAL

## 2020-01-20 ENCOUNTER — PATIENT MESSAGE (OUTPATIENT)
Dept: NEPHROLOGY | Facility: CLINIC | Age: 65
End: 2020-01-20

## 2020-01-20 DIAGNOSIS — Z94.0 KIDNEY REPLACED BY TRANSPLANT: ICD-10-CM

## 2020-01-20 DIAGNOSIS — E78.2 MIXED HYPERLIPIDEMIA: ICD-10-CM

## 2020-01-20 DIAGNOSIS — I50.30 (HFPEF) HEART FAILURE WITH PRESERVED EJECTION FRACTION: ICD-10-CM

## 2020-01-20 DIAGNOSIS — I13.0 HYPERTENSIVE HEART AND RENAL DISEASE WITH RENAL FAILURE, STAGE 1 THROUGH STAGE 4 OR UNSPECIFIED CHRONIC KIDNEY DISEASE, WITH HEART FAILURE: ICD-10-CM

## 2020-01-20 DIAGNOSIS — E87.6 HYPOKALEMIA: ICD-10-CM

## 2020-01-20 DIAGNOSIS — N18.30 CKD (CHRONIC KIDNEY DISEASE), STAGE III: ICD-10-CM

## 2020-01-20 RX ORDER — CARVEDILOL 25 MG/1
25 TABLET ORAL 2 TIMES DAILY WITH MEALS
Qty: 60 TABLET | Refills: 0 | Status: SHIPPED | OUTPATIENT
Start: 2020-01-20 | End: 2020-02-03 | Stop reason: SDUPTHER

## 2020-01-20 RX ORDER — CARVEDILOL 25 MG/1
25 TABLET ORAL 2 TIMES DAILY WITH MEALS
Qty: 180 TABLET | Refills: 3 | Status: SHIPPED | OUTPATIENT
Start: 2020-01-20 | End: 2020-02-03 | Stop reason: SDUPTHER

## 2020-01-20 RX ORDER — POTASSIUM CHLORIDE 750 MG/1
10 TABLET, EXTENDED RELEASE ORAL DAILY
Qty: 90 TABLET | Refills: 3 | Status: SHIPPED | OUTPATIENT
Start: 2020-01-20 | End: 2020-04-22

## 2020-01-20 NOTE — TELEPHONE ENCOUNTER
----- Message from Lynn Rosen sent at 1/20/2020  2:30 PM CST -----  Type:  RX Refill Request    Who Called:  Patti Phelps  Refill or New Rx:  New due to change in insurance and pharmacy  RX Name and Strength:    furosemide (LASIX) 40 MG tablet  mycophenolate (CELLCEPT) 250 mg Cap  paricalcitol (ZEMPLAR) 1 MCG capsule  predniSONE (DELTASONE) 5 MG tablet  spironolactone (ALDACTONE) 25 MG tablet  doxazosin (CARDURA) 4 MG tablet  Is this a 30 day or 90 day RX:  90  Preferred Pharmacy with phone number:    [id number] [address]  [address] [facility] [place] [address]  Phone: [phone] Fax: [phone]  Local or Mail Order:  local  Ordering Provider:  Mónica Miller Call Back Number:  [phone]  Additional Information:

## 2020-01-20 NOTE — TELEPHONE ENCOUNTER
----- Message from Lynn Rosen sent at 1/20/2020  2:46 PM CST -----  Type:  RX Refill Request    Who Called:  Patti - spouse  Refill or New Rx:  New due to new pharamcy  RX Name and Strength: carvedilol (COREG) 25 MG tablet  How is the patient currently taking it? (ex. 1XDay):  1 x 2 x day  Is this a 30 day or 90 day RX:  90  Preferred Pharmacy with phone number:    [id number] [address]  [address] [place] [address]  Phone: [phone] Fax: [phone]  Local or Mail Order:  Local  Ordering Provider:  Shagufta Miller Call Back Number:  [phone] (home)    Additional Information:

## 2020-01-21 ENCOUNTER — TELEPHONE (OUTPATIENT)
Dept: TRANSPLANT | Facility: CLINIC | Age: 65
End: 2020-01-21

## 2020-01-21 DIAGNOSIS — Z94.0 KIDNEY REPLACED BY TRANSPLANT: ICD-10-CM

## 2020-01-21 RX ORDER — TACROLIMUS 1 MG/1
2 CAPSULE ORAL EVERY 12 HOURS
Qty: 360 CAPSULE | Refills: 3 | Status: SHIPPED | OUTPATIENT
Start: 2020-01-21 | End: 2020-01-28 | Stop reason: SDUPTHER

## 2020-01-21 RX ORDER — PREDNISONE 5 MG/1
5 TABLET ORAL EVERY MORNING
Qty: 90 TABLET | Refills: 3 | Status: SHIPPED | OUTPATIENT
Start: 2020-01-21 | End: 2021-01-27 | Stop reason: SDUPTHER

## 2020-01-21 RX ORDER — MYCOPHENOLATE MOFETIL 250 MG/1
500 CAPSULE ORAL EVERY 12 HOURS
Qty: 120 CAPSULE | Refills: 11 | Status: SHIPPED | OUTPATIENT
Start: 2020-01-21 | End: 2020-01-28 | Stop reason: SDUPTHER

## 2020-01-21 RX ORDER — PARICALCITOL 1 UG/1
CAPSULE, LIQUID FILLED ORAL
Qty: 90 CAPSULE | Refills: 3 | Status: SHIPPED | OUTPATIENT
Start: 2020-01-21 | End: 2020-05-19 | Stop reason: SDUPTHER

## 2020-01-21 RX ORDER — TACROLIMUS 1 MG/1
2 CAPSULE ORAL EVERY 12 HOURS
Qty: 120 CAPSULE | Refills: 11 | Status: CANCELLED | OUTPATIENT
Start: 2020-01-21 | End: 2021-01-20

## 2020-01-21 RX ORDER — FUROSEMIDE 40 MG/1
20 TABLET ORAL 2 TIMES DAILY
Qty: 180 TABLET | Refills: 3 | Status: SHIPPED | OUTPATIENT
Start: 2020-01-21 | End: 2021-01-27 | Stop reason: SDUPTHER

## 2020-01-21 RX ORDER — DOXAZOSIN 4 MG/1
4 TABLET ORAL EVERY 12 HOURS
Qty: 180 TABLET | Refills: 3 | Status: SHIPPED | OUTPATIENT
Start: 2020-01-21 | End: 2021-01-27 | Stop reason: SDUPTHER

## 2020-01-21 RX ORDER — SPIRONOLACTONE 25 MG/1
25 TABLET ORAL DAILY
Qty: 90 TABLET | Refills: 3 | Status: SHIPPED | OUTPATIENT
Start: 2020-01-21 | End: 2021-01-27 | Stop reason: SDUPTHER

## 2020-01-21 RX ORDER — NIFEDIPINE 90 MG/1
90 TABLET, EXTENDED RELEASE ORAL DAILY
Qty: 90 TABLET | Refills: 3 | Status: SHIPPED | OUTPATIENT
Start: 2020-01-21 | End: 2021-01-27 | Stop reason: SDUPTHER

## 2020-01-21 RX ORDER — CALCIUM CARBONATE 500(1250)
TABLET ORAL
Qty: 120 TABLET | Refills: 11 | Status: SHIPPED | OUTPATIENT
Start: 2020-01-21 | End: 2021-01-21

## 2020-01-21 RX ORDER — ATORVASTATIN CALCIUM 10 MG/1
10 TABLET, FILM COATED ORAL DAILY
Qty: 90 TABLET | Refills: 3 | Status: SHIPPED | OUTPATIENT
Start: 2020-01-21 | End: 2021-01-27 | Stop reason: SDUPTHER

## 2020-01-21 RX ORDER — WARFARIN SODIUM 5 MG/1
TABLET ORAL
Qty: 75 TABLET | Refills: 1 | Status: SHIPPED | OUTPATIENT
Start: 2020-01-21 | End: 2020-06-11

## 2020-01-21 RX ORDER — ATORVASTATIN CALCIUM 10 MG/1
10 TABLET, FILM COATED ORAL DAILY
Qty: 90 TABLET | Refills: 3 | Status: SHIPPED | OUTPATIENT
Start: 2020-01-21 | End: 2020-02-03

## 2020-01-21 NOTE — TELEPHONE ENCOUNTER
----- Message from Radha Onofre sent at 1/20/2020  6:15 PM CST -----      ----- Message -----  From: Emigdio Bonner RN  Sent: 1/20/2020   2:57 PM CST  To: [facility] Transplant Clinical        ----- Message -----  From: Lynn Rosen  Sent: 1/20/2020   2:49 PM CST  To: [facility] Staff    Type:  RX Refill Request    Who Called:  Patti - spouse  Refill or New Rx:  New due to new pharamcy  RX Name and Strength:tacrolimus (PROGRAF) 1 MG Cap  How is the patient currently taking it? (ex. 1XDay):  2 every 12 hours  Is this a 30 day or 90 day RX:  90  Preferred Pharmacy with phone number:    [id number] [address]  [address]  [place] [address]  Phone: [phone] Fax: [phone]  Local or Mail Order:  Local  Ordering Provider:  Shagufta Miller Call Back Number:  [phone] (home)    Additional Information:

## 2020-01-23 ENCOUNTER — ANTI-COAG VISIT (OUTPATIENT)
Dept: CARDIOLOGY | Facility: CLINIC | Age: 65
End: 2020-01-23
Payer: MEDICARE

## 2020-01-23 DIAGNOSIS — Z79.01 LONG TERM (CURRENT) USE OF ANTICOAGULANTS: Primary | ICD-10-CM

## 2020-01-23 DIAGNOSIS — I47.10 SUPRAVENTRICULAR TACHYCARDIA: ICD-10-CM

## 2020-01-23 LAB — INR PPP: 2.4 (ref 2–3)

## 2020-01-23 PROCEDURE — 93793 ANTICOAG MGMT PT WARFARIN: CPT | Mod: ,,,

## 2020-01-23 PROCEDURE — 93793 PR ANTICOAGULANT MGMT FOR PT TAKING WARFARIN: ICD-10-PCS | Mod: ,,,

## 2020-01-23 PROCEDURE — 85610 PROTHROMBIN TIME: CPT | Mod: PBBFAC

## 2020-01-23 NOTE — PROGRESS NOTES
INR at goal. Medications and chart reviewed. No changes noted to necessitate adjustment of warfarin or follow-up plan. See calendar.  Findings: Pt has little bruising on his arm due to use & denies any other changes.  Maintain current regimen & re-assess in 4 weeks.   Patient was re-educated on situations that would require placing a call to the Coumadin Clinic, including bleeding or unusual bruising issues, changes in health, diet or medications,upcoming procedures that require warfarin interruption, and missed Coumadin dose(s). Patient expressed understanding that avoidance of consistency with these parameters could cause fluctuations in INR, leading to more frequent visits and increase risk of adverse events.

## 2020-01-28 RX ORDER — MYCOPHENOLATE MOFETIL 250 MG/1
500 CAPSULE ORAL 2 TIMES DAILY
Qty: 360 CAPSULE | Refills: 3 | Status: SHIPPED | OUTPATIENT
Start: 2020-01-28 | End: 2021-01-27 | Stop reason: SDUPTHER

## 2020-01-28 RX ORDER — TACROLIMUS 1 MG/1
2 CAPSULE ORAL EVERY 12 HOURS
Qty: 360 CAPSULE | Refills: 3 | Status: SHIPPED | OUTPATIENT
Start: 2020-01-28 | End: 2021-01-27 | Stop reason: SDUPTHER

## 2020-01-28 NOTE — TELEPHONE ENCOUNTER
I spoke with the patient and he is asking for you to authorize the [employer]cept and prograf for [employer] mail order to see if it's more affordable there.

## 2020-02-03 ENCOUNTER — OFFICE VISIT (OUTPATIENT)
Dept: INTERNAL MEDICINE | Facility: CLINIC | Age: 65
End: 2020-02-03
Payer: MEDICARE

## 2020-02-03 ENCOUNTER — IMMUNIZATION (OUTPATIENT)
Dept: PHARMACY | Facility: CLINIC | Age: 65
End: 2020-02-03
Payer: MEDICARE

## 2020-02-03 VITALS
HEIGHT: 73 IN | SYSTOLIC BLOOD PRESSURE: 122 MMHG | HEART RATE: 59 BPM | OXYGEN SATURATION: 99 % | WEIGHT: 226.88 LBS | BODY MASS INDEX: 30.07 KG/M2 | DIASTOLIC BLOOD PRESSURE: 68 MMHG

## 2020-02-03 DIAGNOSIS — I48.0 PAROXYSMAL ATRIAL FIBRILLATION: ICD-10-CM

## 2020-02-03 DIAGNOSIS — Z94.0 H/O KIDNEY TRANSPLANT: ICD-10-CM

## 2020-02-03 DIAGNOSIS — D69.6 THROMBOCYTOPENIA: ICD-10-CM

## 2020-02-03 DIAGNOSIS — N18.30 ANEMIA DUE TO STAGE 3 CHRONIC KIDNEY DISEASE: ICD-10-CM

## 2020-02-03 DIAGNOSIS — M54.42 RIGHT-SIDED LOW BACK PAIN WITH LEFT-SIDED SCIATICA, UNSPECIFIED CHRONICITY: ICD-10-CM

## 2020-02-03 DIAGNOSIS — F17.210 NICOTINE DEPENDENCE, CIGARETTES, UNCOMPLICATED: ICD-10-CM

## 2020-02-03 DIAGNOSIS — D84.821 IMMUNODEFICIENCY DUE TO TREATMENT WITH IMMUNOSUPPRESSIVE MEDICATION: ICD-10-CM

## 2020-02-03 DIAGNOSIS — N25.81 SECONDARY RENAL HYPERPARATHYROIDISM: ICD-10-CM

## 2020-02-03 DIAGNOSIS — E78.2 MIXED HYPERLIPIDEMIA: Primary | ICD-10-CM

## 2020-02-03 DIAGNOSIS — M54.14 THORACIC AND LUMBOSACRAL NEURITIS: ICD-10-CM

## 2020-02-03 DIAGNOSIS — D63.1 ANEMIA DUE TO STAGE 3 CHRONIC KIDNEY DISEASE: ICD-10-CM

## 2020-02-03 DIAGNOSIS — J43.8 OTHER EMPHYSEMA: ICD-10-CM

## 2020-02-03 DIAGNOSIS — I13.0 HYPERTENSIVE HEART AND RENAL DISEASE WITH RENAL FAILURE, STAGE 1 THROUGH STAGE 4 OR UNSPECIFIED CHRONIC KIDNEY DISEASE, WITH HEART FAILURE: ICD-10-CM

## 2020-02-03 DIAGNOSIS — R73.03 PREDIABETES: ICD-10-CM

## 2020-02-03 DIAGNOSIS — I50.32 CHRONIC HEART FAILURE WITH PRESERVED EJECTION FRACTION: ICD-10-CM

## 2020-02-03 DIAGNOSIS — E87.6 HYPOKALEMIA: ICD-10-CM

## 2020-02-03 DIAGNOSIS — Z72.0 TOBACCO USE: ICD-10-CM

## 2020-02-03 DIAGNOSIS — Z79.899 IMMUNODEFICIENCY DUE TO TREATMENT WITH IMMUNOSUPPRESSIVE MEDICATION: ICD-10-CM

## 2020-02-03 DIAGNOSIS — M54.12 CERVICAL RADICULOPATHY: ICD-10-CM

## 2020-02-03 DIAGNOSIS — R63.4 WEIGHT LOSS: ICD-10-CM

## 2020-02-03 DIAGNOSIS — Z12.9 SCREENING FOR CANCER: ICD-10-CM

## 2020-02-03 DIAGNOSIS — M54.17 THORACIC AND LUMBOSACRAL NEURITIS: ICD-10-CM

## 2020-02-03 DIAGNOSIS — I25.10 CORONARY ARTERY DISEASE INVOLVING NATIVE CORONARY ARTERY OF NATIVE HEART WITHOUT ANGINA PECTORIS: ICD-10-CM

## 2020-02-03 DIAGNOSIS — M51.37 DDD (DEGENERATIVE DISC DISEASE), LUMBOSACRAL: ICD-10-CM

## 2020-02-03 DIAGNOSIS — N18.30 CKD (CHRONIC KIDNEY DISEASE) STAGE 3, GFR 30-59 ML/MIN: ICD-10-CM

## 2020-02-03 DIAGNOSIS — M47.819 SPONDYLOSIS WITHOUT MYELOPATHY: ICD-10-CM

## 2020-02-03 PROCEDURE — 99214 OFFICE O/P EST MOD 30 MIN: CPT | Mod: HCNC,S$GLB,, | Performed by: INTERNAL MEDICINE

## 2020-02-03 PROCEDURE — 99499 UNLISTED E&M SERVICE: CPT | Mod: HCNC,S$GLB,, | Performed by: INTERNAL MEDICINE

## 2020-02-03 PROCEDURE — 3008F BODY MASS INDEX DOCD: CPT | Mod: HCNC,CPTII,S$GLB, | Performed by: INTERNAL MEDICINE

## 2020-02-03 PROCEDURE — 99999 PR PBB SHADOW E&M-EST. PATIENT-LVL IV: CPT | Mod: PBBFAC,HCNC,, | Performed by: INTERNAL MEDICINE

## 2020-02-03 PROCEDURE — 3074F PR MOST RECENT SYSTOLIC BLOOD PRESSURE < 130 MM HG: ICD-10-PCS | Mod: HCNC,CPTII,S$GLB, | Performed by: INTERNAL MEDICINE

## 2020-02-03 PROCEDURE — 1101F PT FALLS ASSESS-DOCD LE1/YR: CPT | Mod: HCNC,CPTII,S$GLB, | Performed by: INTERNAL MEDICINE

## 2020-02-03 PROCEDURE — 1101F PR PT FALLS ASSESS DOC 0-1 FALLS W/OUT INJ PAST YR: ICD-10-PCS | Mod: HCNC,CPTII,S$GLB, | Performed by: INTERNAL MEDICINE

## 2020-02-03 PROCEDURE — 3074F SYST BP LT 130 MM HG: CPT | Mod: HCNC,CPTII,S$GLB, | Performed by: INTERNAL MEDICINE

## 2020-02-03 PROCEDURE — 3078F PR MOST RECENT DIASTOLIC BLOOD PRESSURE < 80 MM HG: ICD-10-PCS | Mod: HCNC,CPTII,S$GLB, | Performed by: INTERNAL MEDICINE

## 2020-02-03 PROCEDURE — 3008F PR BODY MASS INDEX (BMI) DOCUMENTED: ICD-10-PCS | Mod: HCNC,CPTII,S$GLB, | Performed by: INTERNAL MEDICINE

## 2020-02-03 PROCEDURE — 99214 PR OFFICE/OUTPT VISIT, EST, LEVL IV, 30-39 MIN: ICD-10-PCS | Mod: HCNC,S$GLB,, | Performed by: INTERNAL MEDICINE

## 2020-02-03 PROCEDURE — 3078F DIAST BP <80 MM HG: CPT | Mod: HCNC,CPTII,S$GLB, | Performed by: INTERNAL MEDICINE

## 2020-02-03 PROCEDURE — 99499 RISK ADDL DX/OHS AUDIT: ICD-10-PCS | Mod: HCNC,S$GLB,, | Performed by: INTERNAL MEDICINE

## 2020-02-03 PROCEDURE — 99999 PR PBB SHADOW E&M-EST. PATIENT-LVL IV: ICD-10-PCS | Mod: PBBFAC,HCNC,, | Performed by: INTERNAL MEDICINE

## 2020-02-03 RX ORDER — GABAPENTIN 300 MG/1
300 CAPSULE ORAL 2 TIMES DAILY
Qty: 180 CAPSULE | Refills: 2 | Status: SHIPPED | OUTPATIENT
Start: 2020-02-03 | End: 2020-10-26

## 2020-02-03 RX ORDER — CARVEDILOL 25 MG/1
25 TABLET ORAL 2 TIMES DAILY WITH MEALS
Qty: 180 TABLET | Refills: 3 | Status: SHIPPED | OUTPATIENT
Start: 2020-02-03 | End: 2021-01-19

## 2020-02-03 NOTE — PROGRESS NOTES
2/3/2020 received request to clear patient for c-scope. With low CHADSvasc score and no h/o CVA will clear patient to hold warfarin x5 days without bridge. Procedure team and Dr. Blunt notified of plan.

## 2020-02-03 NOTE — PATIENT INSTRUCTIONS
Please call Dr. Latif's office to schedule an appt for April with labs prior.  Please make sure Dr. Latif's clinic adds the labs Dr. Magdaleno ordered (HbA1c and lipid profile).    A referral for a colonoscopy has been placed.  Please call [phone] to schedule.

## 2020-02-03 NOTE — PROGRESS NOTES
INTERNAL MEDICINE ESTABLISHED PATIENT VISIT NOTE    Subjective:     Chief Complaint: Follow-up  HTN, preDM     Patient ID: Ibrahima Phelps is a 64 y.o. male with HTN c CKD3 and hx renal transplant x2 in 2002 and 2005 and on immunosuppressive meds and secondary hyperPTH, HLD, A fib, hx SVT, CAD c chronic HF c preserved EF, COPD c baseline DAUGHERTY and tob use, thrombocytopenia now resolved, anemia of chronic disease, preDM, GERD, last seen by me in Oct, here today for f/u.    At last visit was treated for COPD exacerbation.  Several days later was seen in ED for possible insect bite and R ankle pain.      Went back to the ED in Nov and had Xrays done which showed non-union of medial malleolus c/w past films but no new fx.  Was consulted to ortho and had aspiration of the ankle done which was neg for infection or crystal formation and was treated for prednisone.  Final cx all neg.    Today c c/o R arm pain.  Says pain is on superior aspect of R shoulder and radiating down his upper arm, stopping just above the elbow.  Feels like a toothache, intermittent.  Had issues c his neck in the past requiring injections but states he does not want to do injections at this time.    Also c/o intermittent crampy abdominal pain.  No fever, no diarrhea.  Sx diffuse and not affected by meals.    Past Medical History:  Past Medical History:   Diagnosis Date    (HFpEF) heart failure with preserved ejection fraction 9/25/2017    Atrial fibrillation     CAD (coronary artery disease)     CKD (chronic kidney disease) stage 3, GFR 30-59 ml/min     Dr. Latif    Diverticulosis     Fever blister     Heart attack 2006    Hyperlipidemia     Hypertension     Immunodeficiency due to treatment with immunosuppressive medication     Keloid cicatrix     Metabolic bone disease     Pericarditis     S/P kidney transplant     Supraventricular tachycardia 06/2019    Thrombocytopenia     Thyroid disease     Unspecified disorder of kidney and  ureter     Stage IIICKD       Home Medications:  Prior to Admission medications    Medication Sig Start Date End Date Taking? Authorizing Provider   acetaminophen (TYLENOL) 325 MG tablet Take 2 tablets (650 mg total) by mouth every 4 (four) hours as needed. 6/17/19  Yes Paula Emmanuel NP   albuterol (ACCUNEB) 1.25 mg/3 mL Nebu Take 3 mLs (1.25 mg total) by nebulization every 6 (six) hours as needed. Rescue 10/1/19 9/30/20 Yes Connie Magdaleno MD   aspirin (ADULT ASPIRIN EC LOW STRENGTH) 81 MG EC tablet Take 1 tablet by mouth Daily. 6/11/12  Yes Historical Provider, MD   atorvastatin (LIPITOR) 10 MG tablet Take 1 tablet (10 mg total) by mouth once daily. 1/21/20  Yes Emre Latif MD   atorvastatin (LIPITOR) 10 MG tablet Take 1 tablet (10 mg total) by mouth once daily. 1/21/20  Yes Shagufta Blunt MD   calcium carbonate (OS-TRISH) 500 mg calcium (1,250 mg) tablet Two by mouth twice a day 1/21/20  Yes Emre Latif MD   carvedilol (COREG) 25 MG tablet Take 1 tablet (25 mg total) by mouth 2 (two) times daily with meals. 1/20/20 1/19/21 Yes Connie Magdaleno MD   colchicine (COLCRYS) 0.6 mg tablet One po BID up to three days prn gout flare 11/22/19  Yes Emre Latif MD   doxazosin (CARDURA) 4 MG tablet Take 1 tablet (4 mg total) by mouth every 12 (twelve) hours. 1/21/20  Yes Emre Latif MD   fexofenadine (ALLEGRA) 60 MG tablet Take 1 tablet by mouth Twice daily as needed. 6/11/12  Yes Historical Provider, MD   fluticasone propionate (FLONASE) 50 mcg/actuation nasal spray 1 spray (50 mcg total) by Each Nostril route once daily. 7/26/19  Yes VINICIUS RamírezC   furosemide (LASIX) 40 MG tablet Take 0.5 tablets (20 mg total) by mouth 2 (two) times daily. 1/21/20  Yes Emre Latif MD   gabapentin (NEURONTIN) 300 MG capsule 1 po q hs x 3 days, then 1 po bid x 3 days, then 1 po tid.  Patient taking differently: daily as needed. 1 po q hs x 3 days, then 1 po bid x 3 days, then 1 po tid.  1/16/18  Yes Adilia Fernandez PA-C   multivitamin (THERAGRAN) per tablet Take 1 tablet by mouth Daily. 6/11/12  Yes Historical Provider, MD   mycophenolate (CELLCEPT) 250 mg Cap Take 2 capsules (500 mg total) by mouth 2 (two) times daily. 1/28/20 1/22/21 Yes Emre Latif MD   nicotine (NICODERM CQ) 21 mg/24 hr Place 1 patch onto the skin once daily. 7/8/19  Yes Emre Latif MD   NIFEdipine (PROCARDIA-XL) 90 MG (OSM) 24 hr tablet Take 1 tablet (90 mg total) by mouth once daily. 1/21/20 1/20/21 Yes Shagufta Blunt MD   paricalcitol (ZEMPLAR) 1 MCG capsule One po MWF 1/21/20  Yes Emre Latif MD   potassium chloride (KLOR-CON) 10 MEQ TbSR Take 1 tablet (10 mEq total) by mouth once daily. 1/20/20  Yes Connie Magdaleno MD   predniSONE (DELTASONE) 5 MG tablet Take 1 tablet (5 mg total) by mouth every morning. 1/21/20  Yes Emre Latif MD   ranitidine (ZANTAC) 150 MG tablet Take 1 tablet (150 mg total) by mouth 2 (two) times daily. 6/11/18 11/22/20 Yes Emre Latif MD   spironolactone (ALDACTONE) 25 MG tablet Take 1 tablet (25 mg total) by mouth once daily. 1/21/20  Yes Emre Latif MD   tacrolimus (PROGRAF) 1 MG Cap Take 2 capsules (2 mg total) by mouth every 12 (twelve) hours. TAKE 2 CAPSULES BY MOUTH EVERY MORNING AND 2 CAPSULES EVERY EVENING.  Z94.0 kidney transplant 1/28/20 1/27/21 Yes Emre Latif MD   triamcinolone acetonide 0.1% (KENALOG) 0.1 % cream AAA bid to rash on ankles 2/26/19  Yes Lyric Jovel MD   vitamin D 1000 units Tab Take 370 mg by mouth 2 (two) times daily. 2 tablets BID   Yes Historical Provider, MD   warfarin (COUMADIN) 5 MG tablet Take 0.5-1 pill by mouth daily Or as directed by Coumadin Clinic; #75 pills = 3-month supply 1/21/20  Yes Shagufta Blunt MD   carvedilol (COREG) 25 MG tablet Take 1 tablet (25 mg total) by mouth 2 (two) times daily with meals.  Patient not taking: Reported on 2/3/2020 1/20/20 1/19/21  Connie Magdaleno MD   tacrolimus  "(PROGRAF) 1 MG Cap Take 2 capsules (2 mg total) by mouth every 12 (twelve) hours.  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   Substance Use Topics    Alcohol use: No    Drug use: No        Review of Systems   Constitutional: Negative for chills, fatigue and fever.   Respiratory: Negative for cough, chest tightness and shortness of breath.    Cardiovascular: Negative for chest pain.   Gastrointestinal: Negative for abdominal pain and blood in stool.   Genitourinary: Negative for dysuria and frequency.         Health Maintenance:     Immunizations:   Influenza declined by pt, Risks and benefits were discussed with patient.  TDap is up to date 3/2018  Pneumovax 12/2016, Prevnar 13 2/2017, pvax booster today  Shingrix rec today     Cancer Screening:  Colonoscopy: is up to date. 9/2014, polyps, hyperplastic and tubular adenoma, rec f/u in 5 yrs, will schedule now    Objective:   /68 (BP Location: Right arm, Patient Position: Sitting, BP Method: Large (Manual))   Pulse (!) 59   Ht 6' 1" (1.854 m)   Wt 102.9 kg (226 lb 13.7 oz)   SpO2 99%   BMI 29.93 kg/m²        General: AAO x3, no apparent distress  HEENT: PERRL, OP clear  CV: RRR, no m/r/g  Pulm: Lungs CTAB, no crackles, no wheezes  Abd: s/NT/ND +BS  Extremities: no c/c/e    Labs:     Lab Results   Component Value Date    WBC 7.97 12/04/2019    HGB 11.3 (L) 12/04/2019    HCT 38.4 (L) 12/04/2019    MCV 86 12/04/2019     " (L) 12/04/2019     Sodium   Date Value Ref Range Status   12/04/2019 143 136 - 145 mmol/L Final     Potassium   Date Value Ref Range Status   12/04/2019 3.7 3.5 - 5.1 mmol/L Final     Chloride   Date Value Ref Range Status   12/04/2019 109 95 - 110 mmol/L Final     CO2   Date Value Ref Range Status   12/04/2019 26 23 - 29 mmol/L Final     Glucose   Date Value Ref Range Status   12/04/2019 106 70 - 110 mg/dL Final     BUN, Bld   Date Value Ref Range Status   12/04/2019 29 (H) 8 - 23 mg/dL Final     Creatinine   Date Value Ref Range Status   12/04/2019 2.0 (H) 0.5 - 1.4 mg/dL Final     Calcium   Date Value Ref Range Status   12/04/2019 8.9 8.7 - 10.5 mg/dL Final     Total Protein   Date Value Ref Range Status   11/16/2019 7.3 6.0 - 8.4 g/dL Final     Albumin   Date Value Ref Range Status   12/04/2019 3.4 (L) 3.5 - 5.2 g/dL Final     Total Bilirubin   Date Value Ref Range Status   11/16/2019 0.3 0.1 - 1.0 mg/dL Final     Comment:     For infants and newborns, interpretation of results should be based  on gestational age, weight and in agreement with clinical  observations.  Premature Infant recommended reference ranges:  Up to 24 hours.............<8.0 mg/dL  Up to 48 hours............<12.0 mg/dL  3-5 days..................<15.0 mg/dL  6-29 days.................<15.0 mg/dL       Alkaline Phosphatase   Date Value Ref Range Status   11/16/2019 75 55 - 135 U/L Final     AST   Date Value Ref Range Status   11/16/2019 19 10 - 40 U/L Final     ALT   Date Value Ref Range Status   11/16/2019 17 10 - 44 U/L Final     Anion Gap   Date Value Ref Range Status   12/04/2019 8 8 - 16 mmol/L Final     eGFR if    Date Value Ref Range Status   12/04/2019 39.3 (A) >60 mL/min/1.73 m^2 Final     eGFR if non    Date Value Ref Range Status   12/04/2019 34.0 (A) >60 mL/min/1.73 m^2 Final     Comment:     Calculation used to obtain the estimated glomerular filtration  rate (eGFR) is the CKD-EPI equation.         Lab Results   Component Value Date    HGBA1C 5.9 (H) 11/15/2019     Lab Results   Component Value Date    LDLCALC 69.4 04/12/2019     Lab Results   Component Value Date    TSH 1.434 02/02/2019         Assessment/Plan     Ibrahima was seen today for follow-up.    Diagnoses and all orders for this visit:    Mixed hyperlipidemia  Lab Results   Component Value Date    LDLCALC 69.4 04/12/2019     Well controlled on lipitor, cont meds  -     Lipid panel; Future    Prediabetes  Lab Results   Component Value Date    HGBA1C 5.9 (H) 11/15/2019     Stable on Nov labs. No acute issues, cont lifestyle modifications.       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 Chairez for renal issues c most recent Cr improved.  -     carvediloL (COREG) 25 MG tablet; Take 1 tablet (25 mg total) by mouth 2 (two) times daily with meals.    Spondylosis without myelopathy  DDD (degenerative disc disease), lumbosacral  Thoracic and lumbosacral neuritis  Right-sided low back pain with left-sided sciatica, unspecified chronicity  Cervical radiculopathy  Today c R sided shoulder pain c radiation down.  okk to increase gabapentin to BID  Offered xray c spine and referral to spine clinic which pt declined at this time and would like to try taking meds regularly first.  -     gabapentin (NEURONTIN) 300 MG capsule; Take 1 capsule (300 mg total) by mouth 2 (two) times daily.    Screening for cancer  Nicotine dependence, cigarettes, uncomplicated   Other emphysema  Tobacco use  Due for annual CT, will order.  COPD stable and improved from last visit, cont meds  -     CT Chest Lung Screening Low Dose; Future    Hypokalemia  Chronic, has been on K from Nephrology, ok to fill when needed.    Weight loss  Weight fluctuating some but overall stable since last visit  Will monitor    Paroxysmal atrial  fibrillation  Chronic heart failure with preserved ejection fraction  No acute issues  euvolemic today  Rate controlled.    H/O kidney transplant  Anemia due to stage 3 chronic kidney disease  Secondary renal hyperparathyroidism  CKD (chronic kidney disease) stage 3, GFR 30-59 ml/min  Immunodeficiency due to treatment with immunosuppressive medication  Managed by transplant and Dr. Chairez, cont routine labs and f/u    Coronary artery disease involving native coronary artery of native heart without angina pectoris  Stable, no acute issues    Thrombocytopenia  Likely 2/2 transplant meds, will monitor.    Diffuse Abd pain  rec getting csc which is due  If sx persist after csc, could consider CT      HM as above  RTC in 4 mos for f/u, sooner if needed.  Have have labs done c Dr. Latif's next lab draw    Connie Magdaleno MD  Department of Internal Medicine - [facility]  02/06/2020

## 2020-02-04 ENCOUNTER — TELEPHONE (OUTPATIENT)
Dept: ENDOSCOPY | Facility: HOSPITAL | Age: 65
End: 2020-02-04

## 2020-02-04 DIAGNOSIS — Z12.11 SPECIAL SCREENING FOR MALIGNANT NEOPLASMS, COLON: Primary | ICD-10-CM

## 2020-02-04 RX ORDER — SODIUM, POTASSIUM,MAG SULFATES 17.5-3.13G
1 SOLUTION, RECONSTITUTED, ORAL ORAL DAILY
Qty: 1 KIT | Refills: 0 | Status: SHIPPED | OUTPATIENT
Start: 2020-02-04 | End: 2020-02-06

## 2020-02-04 NOTE — TELEPHONE ENCOUNTER
Sloane Booker, PharmD  Nayla Mathew, [place]; [facility] Staff             We have cleared this patient to hold warfarin x5 days for upcoming c-scope. We are not planning to bridge. Please let us know if you have any questions or concerns otherwise we will proceed as planned.     Thanks   [facility]in Clinic   Ph [phone]

## 2020-02-06 PROBLEM — J43.8 OTHER EMPHYSEMA: Status: ACTIVE | Noted: 2019-10-01

## 2020-02-11 ENCOUNTER — HOSPITAL ENCOUNTER (OUTPATIENT)
Dept: RADIOLOGY | Facility: HOSPITAL | Age: 65
Discharge: HOME | End: 2020-02-11
Attending: INTERNAL MEDICINE
Payer: MEDICARE

## 2020-02-11 DIAGNOSIS — Z12.9 SCREENING FOR CANCER: ICD-10-CM

## 2020-02-11 DIAGNOSIS — F17.210 NICOTINE DEPENDENCE, CIGARETTES, UNCOMPLICATED: ICD-10-CM

## 2020-02-11 PROCEDURE — G0297 CT CHEST LUNG SCREENING LOW DOSE: ICD-10-PCS | Mod: 26,HCNC,, | Performed by: RADIOLOGY

## 2020-02-11 PROCEDURE — G0297 LDCT FOR LUNG CA SCREEN: HCPCS | Mod: 26,HCNC,, | Performed by: RADIOLOGY

## 2020-02-11 PROCEDURE — G0297 LDCT FOR LUNG CA SCREEN: HCPCS | Mod: TC,HCNC

## 2020-02-21 ENCOUNTER — ANTI-COAG VISIT (OUTPATIENT)
Dept: CARDIOLOGY | Facility: CLINIC | Age: 65
End: 2020-02-21
Payer: MEDICARE

## 2020-02-21 DIAGNOSIS — Z79.01 LONG TERM (CURRENT) USE OF ANTICOAGULANTS: Primary | ICD-10-CM

## 2020-02-21 DIAGNOSIS — I47.10 SUPRAVENTRICULAR TACHYCARDIA: ICD-10-CM

## 2020-02-21 LAB — INR PPP: 2.6 (ref 2–3)

## 2020-02-21 PROCEDURE — 85610 PROTHROMBIN TIME: CPT | Mod: QW,HCNC,S$GLB, | Performed by: INTERNAL MEDICINE

## 2020-02-21 PROCEDURE — 93793 ANTICOAG MGMT PT WARFARIN: CPT | Mod: HCNC,S$GLB,,

## 2020-02-21 PROCEDURE — 85610 POCT INR: ICD-10-PCS | Mod: QW,HCNC,S$GLB, | Performed by: INTERNAL MEDICINE

## 2020-02-21 PROCEDURE — 93793 PR ANTICOAGULANT MGMT FOR PT TAKING WARFARIN: ICD-10-PCS | Mod: HCNC,S$GLB,,

## 2020-02-21 NOTE — PROGRESS NOTES
INR at goal. Medications and chart reviewed. No changes noted to necessitate adjustment of warfarin or follow-up plan. See calendar.  Maintain current regimen & re-assess in 2 weeks prior to c-scope.   Patient was re-educated on situations that would require placing a call to the Coumadin Clinic, including bleeding or unusual bruising issues, changes in health, diet or medications,upcoming procedures that require warfarin interruption, and missed Coumadin dose(s). Patient expressed understanding that avoidance of consistency with these parameters could cause fluctuations in INR, leading to more frequent visits and increase risk of adverse events.

## 2020-03-03 ENCOUNTER — ANTI-COAG VISIT (OUTPATIENT)
Dept: CARDIOLOGY | Facility: CLINIC | Age: 65
End: 2020-03-03
Payer: MEDICARE

## 2020-03-03 DIAGNOSIS — Z79.01 LONG TERM (CURRENT) USE OF ANTICOAGULANTS: Primary | ICD-10-CM

## 2020-03-03 DIAGNOSIS — I47.10 SUPRAVENTRICULAR TACHYCARDIA: ICD-10-CM

## 2020-03-03 LAB — INR PPP: 3.2 (ref 2–3)

## 2020-03-03 PROCEDURE — 93793 PR ANTICOAGULANT MGMT FOR PT TAKING WARFARIN: ICD-10-PCS | Mod: HCNC,S$GLB,,

## 2020-03-03 PROCEDURE — 93793 ANTICOAG MGMT PT WARFARIN: CPT | Mod: HCNC,S$GLB,,

## 2020-03-03 PROCEDURE — 85610 PROTHROMBIN TIME: CPT | Mod: QW,HCNC,S$GLB, | Performed by: INTERNAL MEDICINE

## 2020-03-03 PROCEDURE — 85610 POCT INR: ICD-10-PCS | Mod: QW,HCNC,S$GLB, | Performed by: INTERNAL MEDICINE

## 2020-03-03 NOTE — PROGRESS NOTES
INR not at goal. Medications, chart, and patient findings reviewed. See calendar for adjustments to dose and follow up plan.  Pt denies any changes & has c-scope scheduled 3/13.  Instructed to have greens 3/3, hold 5 days prior to procedure (per calendar) & will re-assess 1 week after procedure (unless instructed otherwise by MD).   Patient was re-educated on situations that would require placing a call to the Coumadin Clinic, including bleeding or unusual bruising issues, changes in health, diet or medications,upcoming procedures that require warfarin interruption, and missed Coumadin dose(s). Patient expressed understanding that avoidance of consistency with these parameters could cause fluctuations in INR, leading to more frequent visits and increase risk of adverse events.

## 2020-03-13 ENCOUNTER — ANESTHESIA (OUTPATIENT)
Dept: ENDOSCOPY | Facility: HOSPITAL | Age: 65
End: 2020-03-13
Payer: MEDICARE

## 2020-03-13 ENCOUNTER — HOSPITAL ENCOUNTER (OUTPATIENT)
Facility: HOSPITAL | Age: 65
Discharge: HOME | End: 2020-03-13
Attending: INTERNAL MEDICINE | Admitting: INTERNAL MEDICINE
Payer: MEDICARE

## 2020-03-13 ENCOUNTER — ANESTHESIA EVENT (OUTPATIENT)
Dept: ENDOSCOPY | Facility: HOSPITAL | Age: 65
End: 2020-03-13
Payer: MEDICARE

## 2020-03-13 ENCOUNTER — TELEPHONE (OUTPATIENT)
Dept: ENDOSCOPY | Facility: HOSPITAL | Age: 65
End: 2020-03-13

## 2020-03-13 VITALS
RESPIRATION RATE: 18 BRPM | HEIGHT: 73 IN | TEMPERATURE: 98 F | SYSTOLIC BLOOD PRESSURE: 150 MMHG | DIASTOLIC BLOOD PRESSURE: 63 MMHG | HEART RATE: 56 BPM | WEIGHT: 237 LBS | BODY MASS INDEX: 31.41 KG/M2 | OXYGEN SATURATION: 99 %

## 2020-03-13 DIAGNOSIS — D12.6 COLON ADENOMAS: Primary | ICD-10-CM

## 2020-03-13 DIAGNOSIS — Z86.010 HISTORY OF ADENOMATOUS POLYP OF COLON: ICD-10-CM

## 2020-03-13 PROCEDURE — G0105 COLORECTAL SCRN; HI RISK IND: ICD-10-PCS | Mod: HCNC,,, | Performed by: INTERNAL MEDICINE

## 2020-03-13 PROCEDURE — 37000009 HC ANESTHESIA EA ADD 15 MINS: Mod: HCNC | Performed by: INTERNAL MEDICINE

## 2020-03-13 PROCEDURE — 37000008 HC ANESTHESIA 1ST 15 MINUTES: Mod: HCNC | Performed by: INTERNAL MEDICINE

## 2020-03-13 PROCEDURE — E9220 PRA ENDO ANESTHESIA: ICD-10-PCS | Mod: HCNC,,, | Performed by: NURSE ANESTHETIST, CERTIFIED REGISTERED

## 2020-03-13 PROCEDURE — G0105 COLORECTAL SCRN; HI RISK IND: HCPCS | Mod: HCNC,,, | Performed by: INTERNAL MEDICINE

## 2020-03-13 PROCEDURE — 63600175 PHARM REV CODE 636 W HCPCS: Mod: HCNC | Performed by: NURSE ANESTHETIST, CERTIFIED REGISTERED

## 2020-03-13 PROCEDURE — 63600175 PHARM REV CODE 636 W HCPCS: Mod: HCNC | Performed by: INTERNAL MEDICINE

## 2020-03-13 PROCEDURE — E9220 PRA ENDO ANESTHESIA: HCPCS | Mod: HCNC,,, | Performed by: NURSE ANESTHETIST, CERTIFIED REGISTERED

## 2020-03-13 PROCEDURE — G0105 COLORECTAL SCRN; HI RISK IND: HCPCS | Mod: HCNC | Performed by: INTERNAL MEDICINE

## 2020-03-13 RX ORDER — PROPOFOL 10 MG/ML
VIAL (ML) INTRAVENOUS CONTINUOUS PRN
Status: DISCONTINUED | OUTPATIENT
Start: 2020-03-13 | End: 2020-03-13

## 2020-03-13 RX ORDER — SODIUM CHLORIDE 9 MG/ML
INJECTION, SOLUTION INTRAVENOUS CONTINUOUS
Status: DISCONTINUED | OUTPATIENT
Start: 2020-03-13 | End: 2020-03-13 | Stop reason: HOSPADM

## 2020-03-13 RX ORDER — SODIUM CHLORIDE 0.9 % (FLUSH) 0.9 %
10 SYRINGE (ML) INJECTION
Status: DISCONTINUED | OUTPATIENT
Start: 2020-03-13 | End: 2020-03-13 | Stop reason: HOSPADM

## 2020-03-13 RX ORDER — LIDOCAINE HCL/PF 100 MG/5ML
SYRINGE (ML) INTRAVENOUS
Status: DISCONTINUED | OUTPATIENT
Start: 2020-03-13 | End: 2020-03-13

## 2020-03-13 RX ORDER — PROPOFOL 10 MG/ML
VIAL (ML) INTRAVENOUS
Status: DISCONTINUED | OUTPATIENT
Start: 2020-03-13 | End: 2020-03-13

## 2020-03-13 RX ADMIN — PROPOFOL 80 MG: 10 INJECTION, EMULSION INTRAVENOUS at 02:03

## 2020-03-13 RX ADMIN — PROPOFOL 150 MCG/KG/MIN: 10 INJECTION, EMULSION INTRAVENOUS at 02:03

## 2020-03-13 RX ADMIN — SODIUM CHLORIDE: 0.9 INJECTION, SOLUTION INTRAVENOUS at 02:03

## 2020-03-13 RX ADMIN — Medication 100 MG: at 02:03

## 2020-03-13 NOTE — TRANSFER OF CARE
"Anesthesia Transfer of Care Note    Patient: Ibrahima Phelps    Procedure(s) Performed: Procedure(s) (LRB):  COLONOSCOPY (N/A)    Patient location: GI    Anesthesia Type: general    Transport from OR: Transported from OR on room air with adequate spontaneous ventilation    Post pain: adequate analgesia    Post assessment: no apparent anesthetic complications and tolerated procedure well    Post vital signs: stable    Level of consciousness: awake and alert    Nausea/Vomiting: no nausea/vomiting    Complications: none    Transfer of care protocol was followed      Last vitals:   Visit Vitals  BP (!) 190/94 (BP Location: Right arm, Patient Position: Lying)   Pulse 63   Temp 36.7 °C (98.1 °F) (Temporal)   Resp 18   Ht 6' 1" (1.854 m)   Wt 107.5 kg (237 lb)   SpO2 98%   BMI 31.27 kg/m²     "

## 2020-03-13 NOTE — H&P
[facility]  History & Physical    Subjective:      Chief Complaint/Reason for Admission:     Colonoscopy    Ibrahima Phelps is a 64 y.o. male.    Past Medical History:   Diagnosis Date    (HFpEF) heart failure with preserved ejection fraction 9/25/2017    Atrial fibrillation     CAD (coronary artery disease)     CKD (chronic kidney disease) stage 3, GFR 30-59 ml/min     Dr. Latif    Diverticulosis     Fever blister     Heart attack 2006    Hyperlipidemia     Hypertension     Immunodeficiency due to treatment with immunosuppressive medication     Keloid cicatrix     Metabolic bone disease     Pericarditis     S/P kidney transplant     Supraventricular tachycardia 06/2019    Thrombocytopenia     Thyroid disease     Unspecified disorder of kidney and ureter     Stage IIICKD     Past Surgical History:   Procedure Laterality Date    CARDIOVERSION  04/30/15    CHOLECYSTECTOMY      COLONOSCOPY  April 20, 2011    Diverticulosis, repeat recommended in 3 yrs., repeat colonoscopy 2014 revealed 2 polyps.  He should return in 5 years.    CORONARY ANGIOPLASTY WITH STENT PLACEMENT  9/13/2006    KIDNEY TRANSPLANT  2005    PARATHYROIDECTOMY       Family History   Problem Relation Age of Onset    No Known Problems Sister     No Known Problems Brother     Thyroid disease Mother         s/p surgery    Heart disease Father         had pacemaker    No Known Problems Sister     Kidney failure Sister     Kidney disease Sister     No Known Problems Sister     Kidney disease Brother     Kidney failure Brother         s/p transplant    Diabetes Mellitus Neg Hx     Stroke Neg Hx     Heart attack Neg Hx     Cancer Neg Hx     Celiac disease Neg Hx     Cirrhosis Neg Hx     Colon cancer Neg Hx     Esophageal cancer Neg Hx     Inflammatory bowel disease Neg Hx     Rectal cancer Neg Hx     Stomach cancer Neg Hx     Ulcerative colitis Neg Hx     Liver disease Neg Hx     Liver cancer  Neg Hx     Crohn's disease Neg Hx     Melanoma Neg Hx      Social History     Tobacco Use    Smoking status: Current Some Day Smoker     Packs/day: 0.50     Years: 40.00     Pack years: 20.00     Types: Cigarettes    Smokeless tobacco: Never Used    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   Substance Use Topics    Alcohol use: No    Drug use: No       PTA Medications   Medication Sig    acetaminophen (TYLENOL) 325 MG tablet Take 2 tablets (650 mg total) by mouth every 4 (four) hours as needed.    aspirin (ADULT ASPIRIN EC LOW STRENGTH) 81 MG EC tablet Take 1 tablet by mouth Daily.    atorvastatin (LIPITOR) 10 MG tablet Take 1 tablet (10 mg total) by mouth once daily.    calcium carbonate (OS-TRISH) 500 mg calcium (1,250 mg) tablet Two by mouth twice a day    carvediloL (COREG) 25 MG tablet Take 1 tablet (25 mg total) by mouth 2 (two) times daily with meals.    doxazosin (CARDURA) 4 MG tablet Take 1 tablet (4 mg total) by mouth every 12 (twelve) hours.    furosemide (LASIX) 40 MG tablet Take 0.5 tablets (20 mg total) by mouth 2 (two) times daily.    gabapentin (NEURONTIN) 300 MG capsule Take 1 capsule (300 mg total) by mouth 2 (two) times daily.    multivitamin (THERAGRAN) per tablet Take 1 tablet by mouth Daily.    mycophenolate (CELLCEPT) 250 mg Cap Take 2 capsules (500 mg total) by mouth 2 (two) times daily.    nicotine (NICODERM CQ) 21 mg/24 hr Place 1 patch onto the skin once daily.    NIFEdipine (PROCARDIA-XL) 90 MG (OSM) 24 hr tablet Take 1 tablet (90 mg total) by mouth once daily.    paricalcitol (ZEMPLAR) 1 MCG capsule One po MWF    potassium chloride (KLOR-CON) 10 MEQ TbSR Take 1 tablet (10 mEq total) by mouth once daily.    predniSONE (DELTASONE) 5 MG tablet Take 1 tablet (5 mg total) by mouth every morning.    spironolactone (ALDACTONE) 25 MG tablet Take 1 tablet (25 mg total) by mouth once daily.    tacrolimus (PROGRAF) 1 MG  Cap Take 2 capsules (2 mg total) by mouth every 12 (twelve) hours. TAKE 2 CAPSULES BY MOUTH EVERY MORNING AND 2 CAPSULES EVERY EVENING.  Z94.0 kidney transplant    triamcinolone acetonide 0.1% (KENALOG) 0.1 % cream AAA bid to rash on ankles    vitamin D 1000 units Tab Take 370 mg by mouth 2 (two) times daily. 2 tablets BID    warfarin (COUMADIN) 5 MG tablet Take 0.5-1 pill by mouth daily Or as directed by Coumadin Clinic; #75 pills = 3-month supply    albuterol (ACCUNEB) 1.25 mg/3 mL Nebu Take 3 mLs (1.25 mg total) by nebulization every 6 (six) hours as needed. Rescue    colchicine (COLCRYS) 0.6 mg tablet One po BID up to three days prn gout flare    fexofenadine (ALLEGRA) 60 MG tablet Take 1 tablet by mouth Twice daily as needed.    fluticasone propionate (FLONASE) 50 mcg/actuation nasal spray 1 spray (50 mcg total) by Each Nostril route once daily.    ranitidine (ZANTAC) 150 MG tablet Take 1 tablet (150 mg total) by mouth 2 (two) times daily.     Review of patient's allergies indicates:   Allergen Reactions    Ace inhibitors Swelling    Verapamil Other (See Comments)     Other reaction(s): Unknown    Povidone-iodine Itching        Review of Systems   Constitutional: Negative for chills, fever and weight loss.   Respiratory: Negative for shortness of breath and wheezing.    Cardiovascular: Negative for chest pain.   Gastrointestinal: Negative for abdominal pain, blood in stool and melena.       Objective:      Vital Signs (Most Recent)  Temp: 98.1 °F (36.7 °C) (03/13/20 1352)  Pulse: 63 (03/13/20 1352)  Resp: 18 (03/13/20 1352)  BP: (!) 190/94 (03/13/20 1352)  SpO2: 98 % (03/13/20 1352)    Vital Signs Range (Last 24H):  Temp:  [98.1 °F (36.7 °C)]   Pulse:  [63]   Resp:  [18]   BP: (190)/(94)   SpO2:  [98 %]     Physical Exam   Constitutional: He is oriented to person, place, and time. He appears well-developed and well-nourished.   Cardiovascular: Normal rate.   Pulmonary/Chest: Effort normal.    Abdominal: He exhibits no distension.   Neurological: He is alert and oriented to person, place, and time.   Skin: Skin is warm and dry.   Psychiatric: He has a normal mood and affect. His behavior is normal. Judgment and thought content normal.           Assessment:      Active Hospital Problems    Diagnosis  POA    History of adenomatous polyp of colon [Z86.010]  Not Applicable      Resolved Hospital Problems   No resolved problems to display.       Plan:    Colonoscopy for history of colon adenomas

## 2020-03-13 NOTE — [employer] PATIENT INSTRUCTIONS
Discharge Summary/Instructions after an Endoscopic Procedure  Patient Name: Ibrahima Phelps  Patient MRN: [id number]  Patient YOB: 1955  Friday, March 13, 2020  Chon Casper MD  RESTRICTIONS:  During your procedure today, you received medications for sedation.  These   medications may affect your judgment, balance and coordination.  Therefore,   for 24 hours, you have the following restrictions:   - DO NOT drive a car, operate machinery, make legal/financial decisions,   sign important papers or drink alcohol.    ACTIVITY:  Today: no heavy lifting, straining or running due to procedural   sedation/anesthesia.  The following day: return to full activity including work.  DIET:  Eat and drink normally unless instructed otherwise.     TREATMENT FOR COMMON SIDE EFFECTS:  - Mild abdominal pain, nausea, belching, bloating or excessive gas:  rest,   eat lightly and use a heating pad.  - Sore Throat: treat with throat lozenges and/or gargle with warm salt   water.  - Because air was used during the procedure, expelling large amounts of air   from your rectum or belching is normal.  - If a bowel prep was taken, you may not have a bowel movement for 1-3 days.    This is normal.  SYMPTOMS TO WATCH FOR AND REPORT TO YOUR PHYSICIAN:  1. Abdominal pain or bloating, other than gas cramps.  2. Chest pain.  3. Back pain.  4. Signs of infection such as: chills or fever occurring within 24 hours   after the procedure.  5. Rectal bleeding, which would show as bright red, maroon, or black stools.   (A tablespoon of blood from the rectum is not serious, especially if   hemorrhoids are present.)  6. Vomiting.  7. Weakness or dizziness.  GO DIRECTLY TO THE NEAREST EMERGENCY ROOM IF YOU HAVE ANY OF THE FOLLOWING:      Difficulty breathing              Chills and/or fever over 101 F   Persistent vomiting and/or vomiting blood   Severe abdominal pain   Severe chest pain   Black, tarry stools   Bleeding- more than one  tablespoon   Any other symptom or condition that you feel may need urgent attention  Your doctor recommends these additional instructions:  If any biopsies were taken, your doctors clinic will contact you in 1 to 2   weeks with any results.  - Discharge patient to home.   - Resume Coumadin (warfarin) at prior dose today.  Refer to Coumadin Clinic   for further adjustment of therapy.   - Repeat colonoscopy at the next available appointment because the bowel   preparation was poor.   - The findings and recommendations were discussed with the patient.   - Return to referring physician.  For questions, problems or results please call your physician - Chon Casper MD at Work:  [phone].  [facility], EMERGENCY ROOM PHONE NUMBER: [phone]  IF A COMPLICATION OR EMERGENCY SITUATION ARISES AND YOU ARE UNABLE TO REACH   YOUR PHYSICIAN - GO DIRECTLY TO THE EMERGENCY ROOM.  Chon Casper MD  3/13/2020 3:11:00 PM  This report has been verified and signed electronically.  [employer]

## 2020-03-13 NOTE — ANESTHESIA PREPROCEDURE EVALUATION
03/13/2020  Ibrahima Phelps is a 64 y.o., male with PMH kidney tx 2/2 HTN 2005, CAD, HLD, AFib       NEGATIVE stress test 6/2019    Anesthesia Evaluation    I have reviewed the Patient Summary Reports.    I have reviewed the Nursing Notes.   I have reviewed the Medications.     Review of Systems  Anesthesia Hx:  No problems with previous Anesthesia  History of prior surgery of interest to airway management or planning: Previous anesthesia: General Denies Family Hx of Anesthesia complications.   Denies Personal Hx of Anesthesia complications.   Hematology/Oncology:     Oncology Normal    -- Denies Anemia:   Cardiovascular:   Hypertension CAD asymptomatic Dysrhythmias atrial fibrillation  Denies Angina.    Pulmonary:  Pulmonary Normal    Renal/:   Renal transplant Kidney Function/Disease, Chronic Kidney Disease (CKD) (hx renal transplant x2 in 2002 and 2005 ) , CKD Stage III (GFR 30-59)    Musculoskeletal:  Musculoskeletal Normal    Neurological:  Neurology Normal        Physical Exam  General:  Obesity    Airway/Jaw/Neck:  Airway Findings: Mouth Opening: Normal General Airway Assessment: Adult       Chest/Lungs:  Chest/Lungs Findings: Clear to auscultation, Normal Respiratory Rate     Heart/Vascular:  Heart Findings: Rate: Normal  Rhythm: Regular Rhythm  Sounds: Normal        Mental Status:  Mental Status Findings:  Cooperative, Alert and Oriented         Anesthesia Plan  Type of Anesthesia, risks & benefits discussed:  Anesthesia Type:  general  Patient's Preference: ga  Intra-op Monitoring Plan: standard ASA monitors  Intra-op Monitoring Plan Comments:   Post Op Pain Control Plan: per primary service following discharge from PACU  Post Op Pain Control Plan Comments:   Induction:   IV  Beta Blocker:  Patient is on a Beta-Blocker and has received one dose within the past 24 hours (No further documentation  required).       Informed Consent: Patient understands risks and agrees with Anesthesia plan.  Questions answered. Anesthesia consent signed with patient.  ASA Score: 3     Day of Surgery Review of History & Physical:    H&P update referred to the provider.         Ready For Surgery From Anesthesia Perspective.

## 2020-03-13 NOTE — DISCHARGE INSTRUCTIONS
Colonoscopy     A camera attached to a flexible tube with a viewing lens is used to take video pictures.     Colonoscopy is a test to view the inside of your lower digestive tract (colon and rectum). Sometimes it can show the last part of the small intestine (ileum). During the test, small pieces of tissue may be removed for testing. This is called a biopsy. Small growths, such as polyps, may also be removed.   Why is colonoscopy done?  The test is done to help look for colon cancer. And it can help find the source of abdominal pain, bleeding, and changes in bowel habits. It may be needed once a year, depending on factors such as your:  · Age  · Health history  · Family health history  · Symptoms  · Results from any prior colonoscopy  Risks and possible complications  These include:  · Bleeding               · A puncture or tear in the colon   · Risks of anesthesia  · A cancer lesion not being seen  Getting ready   To prepare for the test:  · Talk with your healthcare provider about the risks of the test (see below). Also ask your healthcare provider about alternatives to the test.  · Tell your healthcare provider about any medicines you take. Also tell him or her about any health conditions you may have.  · Make sure your rectum and colon are empty for the test. Follow the diet and bowel prep instructions exactly. If you dont, the test may need to be rescheduled.  · Plan for a friend or family member to drive you home after the test.     Colonoscopy provides an inside view of the entire colon.     You may discuss the results with your doctor right away or at a future visit.  During the test   The test is usually done in the hospital on an outpatient basis. This means you go home the same day. The procedure takes about 30 minutes. During that time:  · You are given relaxing (sedating) medicine through an IV line. You may be drowsy, or fully asleep.  · The healthcare provider will first give you a physical exam to  check for anal and rectal problems.  · Then the anus is lubricated and the scope inserted.  · If you are awake, you may have a feeling similar to needing to have a bowel movement. You may also feel pressure as air is pumped into the colon. Its OK to pass gas during the procedure.  · Biopsy, polyp removal, or other treatments may be done during the test.  After the test   You may have gas right after the test. It can help to try to pass it to help prevent later bloating. Your healthcare provider may discuss the results with you right away. Or you may need to schedule a follow-up visit to talk about the results. After the test, you can go back to your normal eating and other activities. You may be tired from the sedation and need to rest for a few hours.  Date Last Reviewed: 11/1/2016 © 2000-2017 [employer] [address] [place] [address], [place] [address]. All rights reserved. This information is not intended as a substitute for professional medical care. Always follow your healthcare professional's instructions.

## 2020-03-16 ENCOUNTER — TELEPHONE (OUTPATIENT)
Dept: ENDOSCOPY | Facility: HOSPITAL | Age: 65
End: 2020-03-16

## 2020-03-16 NOTE — TELEPHONE ENCOUNTER
Denia Hooks RN  P [facility] Coumad Provider   Caller: Unspecified (Today, 10:58 AM)             Coumadin Clinic,     Pt needs to be rescheduled for colonoscopy due to poor prep. Pt to be rescheduled May 2020.     Is it ok for Pt to hold coumadin x 5 days prior to procedure per endoscopy protocol?     Thank you          Will await response prior to rescheduling Pt.

## 2020-03-19 ENCOUNTER — TELEPHONE (OUTPATIENT)
Dept: ENDOSCOPY | Facility: HOSPITAL | Age: 65
End: 2020-03-19

## 2020-03-19 NOTE — TELEPHONE ENCOUNTER
Sloane Booker, PharmD 2 days ago         Yes - patient may hold x5 days prior as per previous clearance.         Documentation       You 3 days ago             Denia Hooks RN  P [facility] Coumad Provider    Caller: Unspecified (Today, 10:58 AM)               Coumadin Clinic,     Pt needs to be rescheduled for colonoscopy due to poor prep. Pt to be rescheduled May 2020.     Is it ok for Pt to hold coumadin x 5 days prior to procedure per endoscopy protocol?     Thank you              Will await response prior to rescheduling Pt.          Documentation

## 2020-03-20 ENCOUNTER — TELEPHONE (OUTPATIENT)
Dept: ENDOSCOPY | Facility: HOSPITAL | Age: 65
End: 2020-03-20

## 2020-03-20 ENCOUNTER — ANTI-COAG VISIT (OUTPATIENT)
Dept: CARDIOLOGY | Facility: CLINIC | Age: 65
End: 2020-03-20
Payer: MEDICARE

## 2020-03-20 DIAGNOSIS — Z79.01 LONG TERM (CURRENT) USE OF ANTICOAGULANTS: ICD-10-CM

## 2020-03-20 DIAGNOSIS — I47.10 SUPRAVENTRICULAR TACHYCARDIA: ICD-10-CM

## 2020-03-20 PROCEDURE — 93793 PR ANTICOAGULANT MGMT FOR PT TAKING WARFARIN: ICD-10-PCS | Mod: S$GLB,,, | Performed by: PHARMACIST

## 2020-03-20 PROCEDURE — 93793 ANTICOAG MGMT PT WARFARIN: CPT | Mod: S$GLB,,, | Performed by: PHARMACIST

## 2020-03-23 ENCOUNTER — HOSPITAL ENCOUNTER (INPATIENT)
Facility: HOSPITAL | Age: 65
LOS: 1 days | Discharge: HOME | DRG: 315 | End: 2020-03-24
Attending: EMERGENCY MEDICINE | Admitting: EMERGENCY MEDICINE
Payer: MEDICARE

## 2020-03-23 DIAGNOSIS — R07.9 CHEST PAIN, UNSPECIFIED TYPE: ICD-10-CM

## 2020-03-23 DIAGNOSIS — I30.9 ACUTE PERICARDITIS, UNSPECIFIED TYPE: ICD-10-CM

## 2020-03-23 DIAGNOSIS — R79.1 SUBTHERAPEUTIC INTERNATIONAL NORMALIZED RATIO (INR): ICD-10-CM

## 2020-03-23 DIAGNOSIS — M94.0 COSTOCHONDRITIS: Primary | ICD-10-CM

## 2020-03-23 DIAGNOSIS — Z94.0 IMMUNOSUPPRESSIVE MANAGEMENT ENCOUNTER FOLLOWING KIDNEY TRANSPLANT: ICD-10-CM

## 2020-03-23 DIAGNOSIS — N18.9 CHRONIC KIDNEY DISEASE, UNSPECIFIED CKD STAGE: ICD-10-CM

## 2020-03-23 DIAGNOSIS — Z94.0 HX OF KIDNEY TRANSPLANT: ICD-10-CM

## 2020-03-23 DIAGNOSIS — Z79.899 IMMUNOSUPPRESSIVE MANAGEMENT ENCOUNTER FOLLOWING KIDNEY TRANSPLANT: ICD-10-CM

## 2020-03-23 DIAGNOSIS — I50.30 (HFPEF) HEART FAILURE WITH PRESERVED EJECTION FRACTION: ICD-10-CM

## 2020-03-23 DIAGNOSIS — N18.30 CKD (CHRONIC KIDNEY DISEASE) STAGE 3, GFR 30-59 ML/MIN: ICD-10-CM

## 2020-03-23 DIAGNOSIS — R79.89 ELEVATED TROPONIN: ICD-10-CM

## 2020-03-23 DIAGNOSIS — R07.9 CHEST PAIN: ICD-10-CM

## 2020-03-23 DIAGNOSIS — Z94.0 H/O KIDNEY TRANSPLANT: ICD-10-CM

## 2020-03-23 PROBLEM — I16.1 HYPERTENSIVE EMERGENCY: Status: ACTIVE | Noted: 2020-03-23

## 2020-03-23 LAB
ALBUMIN SERPL BCP-MCNC: 3.5 G/DL (ref 3.5–5.2)
ALP SERPL-CCNC: 68 U/L (ref 55–135)
ALT SERPL W/O P-5'-P-CCNC: 21 U/L (ref 10–44)
ANION GAP SERPL CALC-SCNC: 12 MMOL/L (ref 8–16)
ANISOCYTOSIS BLD QL SMEAR: SLIGHT
ASCENDING AORTA: 3.86 CM
AST SERPL-CCNC: 23 U/L (ref 10–40)
AV INDEX (PROSTH): 0.59
AV MEAN GRADIENT: 8 MMHG
AV PEAK GRADIENT: 13 MMHG
AV VALVE AREA: 1.97 CM2
AV VELOCITY RATIO: 0.48
BACTERIA #/AREA URNS AUTO: NORMAL /HPF
BASOPHILS # BLD AUTO: 0.02 K/UL (ref 0–0.2)
BASOPHILS NFR BLD: 0.2 % (ref 0–1.9)
BILIRUB SERPL-MCNC: 0.3 MG/DL (ref 0.1–1)
BILIRUB UR QL STRIP: NEGATIVE
BNP SERPL-MCNC: 456 PG/ML (ref 0–99)
BSA FOR ECHO PROCEDURE: 2.3 M2
BUN SERPL-MCNC: 25 MG/DL (ref 8–23)
CA-I BLDV-SCNC: 1.06 MMOL/L (ref 1.06–1.42)
CALCIUM SERPL-MCNC: 7.9 MG/DL (ref 8.7–10.5)
CHLORIDE SERPL-SCNC: 107 MMOL/L (ref 95–110)
CLARITY UR REFRACT.AUTO: CLEAR
CO2 SERPL-SCNC: 24 MMOL/L (ref 23–29)
COLOR UR AUTO: ABNORMAL
CREAT SERPL-MCNC: 1.9 MG/DL (ref 0.5–1.4)
CRP SERPL-MCNC: 6.7 MG/L (ref 0–8.2)
CV ECHO LV RWT: 0.49 CM
DIFFERENTIAL METHOD: ABNORMAL
DOP CALC AO PEAK VEL: 1.8 M/S
DOP CALC AO VTI: 37.35 CM
DOP CALC LVOT AREA: 3.3 CM2
DOP CALC LVOT DIAMETER: 2.06 CM
DOP CALC LVOT PEAK VEL: 0.86 M/S
DOP CALC LVOT STROKE VOLUME: 73.69 CM3
DOP CALCLVOT PEAK VEL VTI: 22.12 CM
E WAVE DECELERATION TIME: 262.72 MSEC
E/A RATIO: 1.21
E/E' RATIO: 10.25 M/S
ECHO LV POSTERIOR WALL: 1.15 CM (ref 0.6–1.1)
EOSINOPHIL # BLD AUTO: 0.3 K/UL (ref 0–0.5)
EOSINOPHIL NFR BLD: 3.2 % (ref 0–8)
ERYTHROCYTE [DISTWIDTH] IN BLOOD BY AUTOMATED COUNT: 14.6 % (ref 11.5–14.5)
ERYTHROCYTE [SEDIMENTATION RATE] IN BLOOD BY WESTERGREN METHOD: 23 MM/HR (ref 0–23)
EST. GFR  (AFRICAN AMERICAN): 41.8 ML/MIN/1.73 M^2
EST. GFR  (NON AFRICAN AMERICAN): 36.2 ML/MIN/1.73 M^2
FRACTIONAL SHORTENING: 31 % (ref 28–44)
GLUCOSE SERPL-MCNC: 103 MG/DL (ref 70–110)
GLUCOSE UR QL STRIP: NEGATIVE
HCT VFR BLD AUTO: 39.8 % (ref 40–54)
HGB BLD-MCNC: 12 G/DL (ref 14–18)
HGB UR QL STRIP: NEGATIVE
HYALINE CASTS UR QL AUTO: 1 /LPF
IMM GRANULOCYTES # BLD AUTO: 0.03 K/UL (ref 0–0.04)
IMM GRANULOCYTES NFR BLD AUTO: 0.3 % (ref 0–0.5)
INR PPP: 1.6 (ref 0.8–1.2)
INTERVENTRICULAR SEPTUM: 1.14 CM (ref 0.6–1.1)
KETONES UR QL STRIP: NEGATIVE
LA MAJOR: 6.27 CM
LA MINOR: 4.87 CM
LA WIDTH: 4.25 CM
LEFT ATRIUM SIZE: 3.34 CM
LEFT ATRIUM VOLUME INDEX: 29.2 ML/M2
LEFT ATRIUM VOLUME: 66.14 CM3
LEFT INTERNAL DIMENSION IN SYSTOLE: 3.21 CM (ref 2.1–4)
LEFT VENTRICLE DIASTOLIC VOLUME INDEX: 44.59 ML/M2
LEFT VENTRICLE DIASTOLIC VOLUME: 101.06 ML
LEFT VENTRICLE MASS INDEX: 87 G/M2
LEFT VENTRICLE SYSTOLIC VOLUME INDEX: 18.2 ML/M2
LEFT VENTRICLE SYSTOLIC VOLUME: 41.18 ML
LEFT VENTRICULAR INTERNAL DIMENSION IN DIASTOLE: 4.67 CM (ref 3.5–6)
LEFT VENTRICULAR MASS: 196.37 G
LEUKOCYTE ESTERASE UR QL STRIP: NEGATIVE
LV LATERAL E/E' RATIO: 8.2 M/S
LV SEPTAL E/E' RATIO: 13.67 M/S
LYMPHOCYTES # BLD AUTO: 1.4 K/UL (ref 1–4.8)
LYMPHOCYTES NFR BLD: 16.5 % (ref 18–48)
MAGNESIUM SERPL-MCNC: 1.7 MG/DL (ref 1.6–2.6)
MCH RBC QN AUTO: 25.2 PG (ref 27–31)
MCHC RBC AUTO-ENTMCNC: 30.2 G/DL (ref 32–36)
MCV RBC AUTO: 84 FL (ref 82–98)
MICROSCOPIC COMMENT: NORMAL
MONOCYTES # BLD AUTO: 0.9 K/UL (ref 0.3–1)
MONOCYTES NFR BLD: 9.8 % (ref 4–15)
MV PEAK A VEL: 0.68 M/S
MV PEAK E VEL: 0.82 M/S
NEUTROPHILS # BLD AUTO: 6.1 K/UL (ref 1.8–7.7)
NEUTROPHILS NFR BLD: 70 % (ref 38–73)
NITRITE UR QL STRIP: NEGATIVE
NRBC BLD-RTO: 0 /100 WBC
PH UR STRIP: 5 [PH] (ref 5–8)
PLATELET # BLD AUTO: 139 K/UL (ref 150–350)
PLATELET BLD QL SMEAR: ABNORMAL
PMV BLD AUTO: 12.6 FL (ref 9.2–12.9)
POTASSIUM SERPL-SCNC: 3.6 MMOL/L (ref 3.5–5.1)
PROT SERPL-MCNC: 6.6 G/DL (ref 6–8.4)
PROT UR QL STRIP: ABNORMAL
PROTHROMBIN TIME: 15.6 SEC (ref 9–12.5)
RA MAJOR: 5.16 CM
RA PRESSURE: 8 MMHG
RA WIDTH: 4.49 CM
RBC # BLD AUTO: 4.76 M/UL (ref 4.6–6.2)
RBC #/AREA URNS AUTO: 0 /HPF (ref 0–4)
RIGHT VENTRICULAR END-DIASTOLIC DIMENSION: 3.42 CM
SINUS: 2.89 CM
SODIUM SERPL-SCNC: 143 MMOL/L (ref 136–145)
SP GR UR STRIP: 1.01 (ref 1–1.03)
STJ: 3.31 CM
TACROLIMUS BLD-MCNC: 4.7 NG/ML (ref 5–15)
TDI LATERAL: 0.1 M/S
TDI SEPTAL: 0.06 M/S
TDI: 0.08 M/S
TRICUSPID ANNULAR PLANE SYSTOLIC EXCURSION: 2.46 CM
TROPONIN I SERPL DL<=0.01 NG/ML-MCNC: 0.02 NG/ML (ref 0–0.03)
TROPONIN I SERPL DL<=0.01 NG/ML-MCNC: 0.03 NG/ML (ref 0–0.03)
TROPONIN I SERPL DL<=0.01 NG/ML-MCNC: 0.05 NG/ML (ref 0–0.03)
URN SPEC COLLECT METH UR: ABNORMAL
WBC # BLD AUTO: 8.68 K/UL (ref 3.9–12.7)
WBC #/AREA URNS AUTO: 0 /HPF (ref 0–5)

## 2020-03-23 PROCEDURE — 84484 ASSAY OF TROPONIN QUANT: CPT | Mod: 91,HCNC

## 2020-03-23 PROCEDURE — 81001 URINALYSIS AUTO W/SCOPE: CPT | Mod: HCNC

## 2020-03-23 PROCEDURE — 99285 EMERGENCY DEPT VISIT HI MDM: CPT | Mod: 25,HCNC

## 2020-03-23 PROCEDURE — 25000003 PHARM REV CODE 250: Mod: HCNC | Performed by: PHYSICIAN ASSISTANT

## 2020-03-23 PROCEDURE — 80053 COMPREHEN METABOLIC PANEL: CPT | Mod: HCNC

## 2020-03-23 PROCEDURE — 99285 PR EMERGENCY DEPT VISIT,LEVEL V: ICD-10-PCS | Mod: ,,, | Performed by: EMERGENCY MEDICINE

## 2020-03-23 PROCEDURE — 86140 C-REACTIVE PROTEIN: CPT | Mod: HCNC

## 2020-03-23 PROCEDURE — 93005 ELECTROCARDIOGRAM TRACING: CPT | Mod: HCNC

## 2020-03-23 PROCEDURE — 93010 ELECTROCARDIOGRAM REPORT: CPT | Mod: HCNC,,, | Performed by: INTERNAL MEDICINE

## 2020-03-23 PROCEDURE — 93010 ELECTROCARDIOGRAM REPORT: CPT | Mod: 76,HCNC,, | Performed by: INTERNAL MEDICINE

## 2020-03-23 PROCEDURE — 63700000 PHARM REV CODE 250 ALT 637 W/O HCPCS: Mod: HCNC | Performed by: STUDENT IN AN ORGANIZED HEALTH CARE EDUCATION/TRAINING PROGRAM

## 2020-03-23 PROCEDURE — 83880 ASSAY OF NATRIURETIC PEPTIDE: CPT | Mod: HCNC

## 2020-03-23 PROCEDURE — 85025 COMPLETE CBC W/AUTO DIFF WBC: CPT | Mod: HCNC

## 2020-03-23 PROCEDURE — 63600175 PHARM REV CODE 636 W HCPCS: Mod: HCNC | Performed by: STUDENT IN AN ORGANIZED HEALTH CARE EDUCATION/TRAINING PROGRAM

## 2020-03-23 PROCEDURE — 84484 ASSAY OF TROPONIN QUANT: CPT | Mod: HCNC

## 2020-03-23 PROCEDURE — 96374 THER/PROPH/DIAG INJ IV PUSH: CPT | Mod: HCNC

## 2020-03-23 PROCEDURE — 93005 ELECTROCARDIOGRAM TRACING: CPT | Mod: HCNC | Performed by: INTERNAL MEDICINE

## 2020-03-23 PROCEDURE — 85652 RBC SED RATE AUTOMATED: CPT | Mod: HCNC

## 2020-03-23 PROCEDURE — 93010 EKG 12-LEAD: ICD-10-PCS | Mod: 76,HCNC,, | Performed by: INTERNAL MEDICINE

## 2020-03-23 PROCEDURE — 85610 PROTHROMBIN TIME: CPT | Mod: HCNC

## 2020-03-23 PROCEDURE — 83735 ASSAY OF MAGNESIUM: CPT | Mod: HCNC

## 2020-03-23 PROCEDURE — 99223 PR INITIAL HOSPITAL CARE,LEVL III: ICD-10-PCS | Mod: HCNC,AI,GC, | Performed by: HOSPITALIST

## 2020-03-23 PROCEDURE — 82330 ASSAY OF CALCIUM: CPT | Mod: HCNC

## 2020-03-23 PROCEDURE — 63600175 PHARM REV CODE 636 W HCPCS: Mod: HCNC | Performed by: PHYSICIAN ASSISTANT

## 2020-03-23 PROCEDURE — 93010 EKG 12-LEAD: ICD-10-PCS | Mod: HCNC,,, | Performed by: INTERNAL MEDICINE

## 2020-03-23 PROCEDURE — 99223 1ST HOSP IP/OBS HIGH 75: CPT | Mod: HCNC,AI,GC, | Performed by: HOSPITALIST

## 2020-03-23 PROCEDURE — 80197 ASSAY OF TACROLIMUS: CPT | Mod: HCNC

## 2020-03-23 PROCEDURE — 20600001 HC STEP DOWN PRIVATE ROOM: Mod: HCNC

## 2020-03-23 PROCEDURE — 25000242 PHARM REV CODE 250 ALT 637 W/ HCPCS: Mod: HCNC | Performed by: STUDENT IN AN ORGANIZED HEALTH CARE EDUCATION/TRAINING PROGRAM

## 2020-03-23 PROCEDURE — 25000003 PHARM REV CODE 250: Mod: HCNC | Performed by: STUDENT IN AN ORGANIZED HEALTH CARE EDUCATION/TRAINING PROGRAM

## 2020-03-23 PROCEDURE — 99223 PR INITIAL HOSPITAL CARE,LEVL III: ICD-10-PCS | Mod: HCNC,,, | Performed by: INTERNAL MEDICINE

## 2020-03-23 PROCEDURE — 99223 1ST HOSP IP/OBS HIGH 75: CPT | Mod: HCNC,,, | Performed by: INTERNAL MEDICINE

## 2020-03-23 PROCEDURE — 99285 EMERGENCY DEPT VISIT HI MDM: CPT | Mod: ,,, | Performed by: EMERGENCY MEDICINE

## 2020-03-23 PROCEDURE — 36415 COLL VENOUS BLD VENIPUNCTURE: CPT | Mod: HCNC

## 2020-03-23 RX ORDER — SODIUM CHLORIDE 0.9 % (FLUSH) 0.9 %
10 SYRINGE (ML) INJECTION
Status: DISCONTINUED | OUTPATIENT
Start: 2020-03-23 | End: 2020-03-24 | Stop reason: HOSPADM

## 2020-03-23 RX ORDER — ONDANSETRON 8 MG/1
8 TABLET, ORALLY DISINTEGRATING ORAL EVERY 8 HOURS PRN
Status: DISCONTINUED | OUTPATIENT
Start: 2020-03-23 | End: 2020-03-24 | Stop reason: HOSPADM

## 2020-03-23 RX ORDER — DOXAZOSIN 2 MG/1
4 TABLET ORAL EVERY 12 HOURS
Status: DISCONTINUED | OUTPATIENT
Start: 2020-03-23 | End: 2020-03-24 | Stop reason: HOSPADM

## 2020-03-23 RX ORDER — SENNOSIDES 8.6 MG/1
8.6 TABLET ORAL DAILY PRN
Status: DISCONTINUED | OUTPATIENT
Start: 2020-03-23 | End: 2020-03-24 | Stop reason: HOSPADM

## 2020-03-23 RX ORDER — TACROLIMUS 1 MG/1
2 CAPSULE ORAL 2 TIMES DAILY
Status: DISCONTINUED | OUTPATIENT
Start: 2020-03-23 | End: 2020-03-24 | Stop reason: HOSPADM

## 2020-03-23 RX ORDER — NITROGLYCERIN 0.4 MG/1
0.4 TABLET SUBLINGUAL EVERY 5 MIN PRN
Status: DISCONTINUED | OUTPATIENT
Start: 2020-03-23 | End: 2020-03-24 | Stop reason: HOSPADM

## 2020-03-23 RX ORDER — SODIUM CHLORIDE 0.9 % (FLUSH) 0.9 %
10 SYRINGE (ML) INJECTION
Status: CANCELLED | OUTPATIENT
Start: 2020-03-23

## 2020-03-23 RX ORDER — SPIRONOLACTONE 25 MG/1
25 TABLET ORAL DAILY
Status: DISCONTINUED | OUTPATIENT
Start: 2020-03-23 | End: 2020-03-23

## 2020-03-23 RX ORDER — ASPIRIN 81 MG/1
81 TABLET ORAL DAILY
Status: DISCONTINUED | OUTPATIENT
Start: 2020-03-23 | End: 2020-03-23

## 2020-03-23 RX ORDER — GLUCAGON 1 MG
1 KIT INJECTION
Status: DISCONTINUED | OUTPATIENT
Start: 2020-03-23 | End: 2020-03-24 | Stop reason: HOSPADM

## 2020-03-23 RX ORDER — FUROSEMIDE 10 MG/ML
40 INJECTION INTRAMUSCULAR; INTRAVENOUS ONCE
Status: COMPLETED | OUTPATIENT
Start: 2020-03-23 | End: 2020-03-23

## 2020-03-23 RX ORDER — CALCIUM CARBONATE 500(1250)
1000 TABLET ORAL 2 TIMES DAILY
Status: DISCONTINUED | OUTPATIENT
Start: 2020-03-23 | End: 2020-03-24 | Stop reason: HOSPADM

## 2020-03-23 RX ORDER — ASPIRIN 325 MG
325 TABLET, DELAYED RELEASE (ENTERIC COATED) ORAL
Status: COMPLETED | OUTPATIENT
Start: 2020-03-23 | End: 2020-03-23

## 2020-03-23 RX ORDER — ALBUTEROL SULFATE 90 UG/1
1 AEROSOL, METERED RESPIRATORY (INHALATION) EVERY 6 HOURS PRN
Status: DISCONTINUED | OUTPATIENT
Start: 2020-03-23 | End: 2020-03-24 | Stop reason: HOSPADM

## 2020-03-23 RX ORDER — IBUPROFEN 200 MG
16 TABLET ORAL
Status: DISCONTINUED | OUTPATIENT
Start: 2020-03-23 | End: 2020-03-24 | Stop reason: HOSPADM

## 2020-03-23 RX ORDER — WARFARIN SODIUM 5 MG/1
5 TABLET ORAL DAILY
Status: DISCONTINUED | OUTPATIENT
Start: 2020-03-23 | End: 2020-03-24

## 2020-03-23 RX ORDER — MORPHINE SULFATE 4 MG/ML
4 INJECTION, SOLUTION INTRAMUSCULAR; INTRAVENOUS
Status: COMPLETED | OUTPATIENT
Start: 2020-03-23 | End: 2020-03-23

## 2020-03-23 RX ORDER — TALC
6 POWDER (GRAM) TOPICAL NIGHTLY PRN
Status: DISCONTINUED | OUTPATIENT
Start: 2020-03-23 | End: 2020-03-24 | Stop reason: HOSPADM

## 2020-03-23 RX ORDER — ACETAMINOPHEN 325 MG/1
650 TABLET ORAL EVERY 8 HOURS PRN
Status: DISCONTINUED | OUTPATIENT
Start: 2020-03-23 | End: 2020-03-24 | Stop reason: HOSPADM

## 2020-03-23 RX ORDER — FAMOTIDINE 20 MG/1
20 TABLET, FILM COATED ORAL 2 TIMES DAILY
Status: DISCONTINUED | OUTPATIENT
Start: 2020-03-23 | End: 2020-03-23

## 2020-03-23 RX ORDER — HYDRALAZINE HYDROCHLORIDE 25 MG/1
25 TABLET, FILM COATED ORAL EVERY 8 HOURS
Status: DISCONTINUED | OUTPATIENT
Start: 2020-03-23 | End: 2020-03-24

## 2020-03-23 RX ORDER — COLCHICINE 0.6 MG/1
0.6 TABLET, FILM COATED ORAL
Status: COMPLETED | OUTPATIENT
Start: 2020-03-23 | End: 2020-03-23

## 2020-03-23 RX ORDER — ASPIRIN 81 MG/1
81 TABLET ORAL DAILY
Status: DISCONTINUED | OUTPATIENT
Start: 2020-03-24 | End: 2020-03-24 | Stop reason: HOSPADM

## 2020-03-23 RX ORDER — ACETAMINOPHEN 325 MG/1
650 TABLET ORAL EVERY 4 HOURS PRN
Status: DISCONTINUED | OUTPATIENT
Start: 2020-03-23 | End: 2020-03-24 | Stop reason: HOSPADM

## 2020-03-23 RX ORDER — FLUTICASONE PROPIONATE 50 MCG
1 SPRAY, SUSPENSION (ML) NASAL DAILY
Status: DISCONTINUED | OUTPATIENT
Start: 2020-03-23 | End: 2020-03-24 | Stop reason: HOSPADM

## 2020-03-23 RX ORDER — CARVEDILOL 25 MG/1
25 TABLET ORAL 2 TIMES DAILY WITH MEALS
Status: DISCONTINUED | OUTPATIENT
Start: 2020-03-23 | End: 2020-03-24 | Stop reason: HOSPADM

## 2020-03-23 RX ORDER — NIFEDIPINE 30 MG/1
90 TABLET, EXTENDED RELEASE ORAL DAILY
Status: DISCONTINUED | OUTPATIENT
Start: 2020-03-23 | End: 2020-03-24 | Stop reason: HOSPADM

## 2020-03-23 RX ORDER — MORPHINE SULFATE 2 MG/ML
2 INJECTION, SOLUTION INTRAMUSCULAR; INTRAVENOUS EVERY 6 HOURS PRN
Status: DISCONTINUED | OUTPATIENT
Start: 2020-03-23 | End: 2020-03-24 | Stop reason: HOSPADM

## 2020-03-23 RX ORDER — IBUPROFEN 200 MG
24 TABLET ORAL
Status: DISCONTINUED | OUTPATIENT
Start: 2020-03-23 | End: 2020-03-24 | Stop reason: HOSPADM

## 2020-03-23 RX ORDER — PREDNISONE 5 MG/1
5 TABLET ORAL EVERY MORNING
Status: DISCONTINUED | OUTPATIENT
Start: 2020-03-23 | End: 2020-03-24 | Stop reason: HOSPADM

## 2020-03-23 RX ORDER — ATORVASTATIN CALCIUM 10 MG/1
10 TABLET, FILM COATED ORAL DAILY
Status: DISCONTINUED | OUTPATIENT
Start: 2020-03-23 | End: 2020-03-24 | Stop reason: HOSPADM

## 2020-03-23 RX ORDER — POLYETHYLENE GLYCOL 3350 17 G/17G
17 POWDER, FOR SOLUTION ORAL DAILY PRN
Status: DISCONTINUED | OUTPATIENT
Start: 2020-03-23 | End: 2020-03-24 | Stop reason: HOSPADM

## 2020-03-23 RX ORDER — MYCOPHENOLATE MOFETIL 250 MG/1
500 CAPSULE ORAL 2 TIMES DAILY
Status: DISCONTINUED | OUTPATIENT
Start: 2020-03-23 | End: 2020-03-24 | Stop reason: HOSPADM

## 2020-03-23 RX ORDER — FAMOTIDINE 20 MG/1
20 TABLET, FILM COATED ORAL DAILY
Status: DISCONTINUED | OUTPATIENT
Start: 2020-03-23 | End: 2020-03-24 | Stop reason: HOSPADM

## 2020-03-23 RX ORDER — GABAPENTIN 300 MG/1
300 CAPSULE ORAL 2 TIMES DAILY
Status: DISCONTINUED | OUTPATIENT
Start: 2020-03-23 | End: 2020-03-24 | Stop reason: HOSPADM

## 2020-03-23 RX ORDER — MAGNESIUM SULFATE HEPTAHYDRATE 40 MG/ML
2 INJECTION, SOLUTION INTRAVENOUS ONCE
Status: COMPLETED | OUTPATIENT
Start: 2020-03-23 | End: 2020-03-23

## 2020-03-23 RX ADMIN — TACROLIMUS 2 MG: 1 CAPSULE ORAL at 04:03

## 2020-03-23 RX ADMIN — MYCOPHENOLATE MOFETIL 500 MG: 250 CAPSULE ORAL at 08:03

## 2020-03-23 RX ADMIN — WARFARIN SODIUM 5 MG: 5 TABLET ORAL at 04:03

## 2020-03-23 RX ADMIN — PREDNISONE 5 MG: 5 TABLET ORAL at 09:03

## 2020-03-23 RX ADMIN — FUROSEMIDE 40 MG: 10 INJECTION, SOLUTION INTRAVENOUS at 03:03

## 2020-03-23 RX ADMIN — DOXAZOSIN 4 MG: 2 TABLET ORAL at 10:03

## 2020-03-23 RX ADMIN — CALCIUM 1000 MG: 500 TABLET ORAL at 08:03

## 2020-03-23 RX ADMIN — MAGNESIUM SULFATE HEPTAHYDRATE 2 G: 40 INJECTION, SOLUTION INTRAVENOUS at 09:03

## 2020-03-23 RX ADMIN — COLCHICINE 0.6 MG: 0.6 TABLET, FILM COATED ORAL at 05:03

## 2020-03-23 RX ADMIN — GABAPENTIN 300 MG: 300 CAPSULE ORAL at 10:03

## 2020-03-23 RX ADMIN — GABAPENTIN 300 MG: 300 CAPSULE ORAL at 08:03

## 2020-03-23 RX ADMIN — HYDRALAZINE HYDROCHLORIDE 25 MG: 25 TABLET, FILM COATED ORAL at 08:03

## 2020-03-23 RX ADMIN — FAMOTIDINE 20 MG: 20 TABLET, FILM COATED ORAL at 11:03

## 2020-03-23 RX ADMIN — FLUTICASONE PROPIONATE 50 MCG: 50 SPRAY, METERED NASAL at 09:03

## 2020-03-23 RX ADMIN — NITROGLYCERIN 0.4 MG: 0.4 TABLET SUBLINGUAL at 05:03

## 2020-03-23 RX ADMIN — ATORVASTATIN CALCIUM 10 MG: 10 TABLET, FILM COATED ORAL at 09:03

## 2020-03-23 RX ADMIN — MORPHINE SULFATE 4 MG: 4 INJECTION, SOLUTION INTRAMUSCULAR; INTRAVENOUS at 03:03

## 2020-03-23 RX ADMIN — MORPHINE SULFATE 2 MG: 2 INJECTION, SOLUTION INTRAMUSCULAR; INTRAVENOUS at 08:03

## 2020-03-23 RX ADMIN — CALCIUM 1000 MG: 500 TABLET ORAL at 10:03

## 2020-03-23 RX ADMIN — DOXAZOSIN 4 MG: 2 TABLET ORAL at 08:03

## 2020-03-23 RX ADMIN — ASPIRIN 325 MG: 325 TABLET, COATED ORAL at 03:03

## 2020-03-23 RX ADMIN — NIFEDIPINE 90 MG: 30 TABLET, FILM COATED, EXTENDED RELEASE ORAL at 09:03

## 2020-03-23 RX ADMIN — CARVEDILOL 25 MG: 25 TABLET, FILM COATED ORAL at 09:03

## 2020-03-23 RX ADMIN — MORPHINE SULFATE 4 MG: 4 INJECTION, SOLUTION INTRAMUSCULAR; INTRAVENOUS at 06:03

## 2020-03-23 RX ADMIN — TACROLIMUS 2 MG: 1 CAPSULE ORAL at 09:03

## 2020-03-23 RX ADMIN — CARVEDILOL 25 MG: 25 TABLET, FILM COATED ORAL at 04:03

## 2020-03-23 NOTE — HOSPITAL COURSE
Patient admitted to hospital medicine on 3/23 for chest pain most likely 2/2 HTN emergency vs costochondritis (reproducible)- repeat EKG stable and formal echo demonstrating concentric left ventricular remodeling, EF of 60%, normal right ventricular systolic function, normal LV diastolic function, aortic sclerosis without significant stenosis, and Intermediate central venous pressure (8 mmHg). Patient was diuresed with 40 mg IV Lasix- with good urine output. Re demonstration of chest pain at 1700 on 3/23- negative troponin and repeat EKG wnl. Patient clinically dry on exam on 3/24. Patient with chest pain intermittently throughout the night between 3/23-3/24- continues to be reproducible- well controlled with tylenol and morphine for severe pain. Transitioned to topical Lidoderm patch and discontinued morphine.  KTM consulted in setting of kidney transplant and elevated Cr. UA wnl. KTM agrees that kidney function is at baseline for this patient. Patient's home immunosuppressive prograf, Cellcept and prednisone resumed. HTN not well controlled with home medications. Hydralazine initiated on 3/23 at 25 mg TID. Titrated up to 50 mg TID with better control.     Patient medically stable and ready to DC home. Plan for hospital follow up with PCP and for BP check.

## 2020-03-23 NOTE — SUBJECTIVE & OBJECTIVE
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 The CP has been present since yesterday, is 10/10, and stabbing in nature and associated with SOB. Denies cough, fevers, chills, sick contacts, or recent travel. Pt reports CP is similar to past episodes of CP 2/2 pericarditis. In the ED, EKG and CXR without significant findings and Tn 0.05, trended down to 0.03. Pt received a dose of colchicine in ED for treatment of presumed pericarditis.    Additional KTM PMH:  He had transplant nephrectomy of DDRT #1 (possibly in 1997 vs 2005 around time of second transplant). He has CKD stage 2/3 - GFR 30-89 and his baseline creatinine is between 1.6 to 1.8. He takes mycophenolate mofetil, prednisone and tacrolimus for maintenance immunosuppression.     Past Medical and Surgical History: Mr. Phelps has a past medical history of (HFpEF) heart failure with preserved ejection fraction (9/25/2017), Atrial fibrillation, CAD (coronary artery disease), CKD (chronic kidney disease) stage 3, GFR 30-59 ml/min, Diverticulosis, Fever blister, Heart attack (2006), Hyperlipidemia, Hypertension, Immunodeficiency due to treatment with immunosuppressive medication, Keloid cicatrix, Metabolic bone disease, Pericarditis, S/P kidney transplant, Supraventricular tachycardia (06/2019), Thrombocytopenia, Thyroid disease, and Unspecified disorder of kidney and ureter.  He has a past surgical history that includes Cholecystectomy; Cardioversion (04/30/15); Kidney transplant (2005); Parathyroidectomy; Colonoscopy (April 20, 2011); Coronary angioplasty with stent  (9/13/2006); and Colonoscopy (N/A, 3/13/2020).    Past Social and Family History: Mr. Phelps reports that he has been smoking cigarettes. He has a 20.00 pack-year smoking history. He has never used smokeless tobacco. He reports that he does not drink alcohol or use drugs.His family history includes Heart disease in his father; Kidney disease in his brother and sister; Kidney failure in his brother and sister; No Known Problems in his brother, sister, sister, and sister; Thyroid disease in his mother.    Intake/Output - Last 3 Shifts     None           Review of Systems   Constitutional: Negative for chills and fever.   HENT: Negative for congestion and rhinorrhea.    Eyes: Negative for pain and visual disturbance.   Respiratory: Positive for shortness of breath. Negative for cough.    Cardiovascular: Positive for chest pain. Negative for palpitations and leg swelling.   Gastrointestinal: Negative for abdominal pain, constipation and diarrhea.   Endocrine: Negative for polydipsia and polyuria.   Genitourinary: Negative for dysuria and hematuria.   Musculoskeletal: Negative for arthralgias and myalgias.   Skin: Negative for rash and wound.   Neurological: Negative for dizziness, weakness and headaches.   Psychiatric/Behavioral: Negative for agitation and behavioral problems.     Objective:     Vital Signs (Most Recent):  Temp: 97 °F (36.1 °C) (03/23/20 0742)  Pulse: 63 (03/23/20 0742)  Resp: 18 (03/23/20 0742)  BP: (!) 182/88 (03/23/20 0744)  SpO2: 98 % (03/23/20 0742) Vital Signs (24h Range):  Temp:  [97 °F (36.1 °C)-98.5 °F (36.9 °C)] 97 °F (36.1 °C)  Pulse:  [58-63] 63  Resp:  [17-21] 18  SpO2:  [98 %-100 %] 98 %  BP: (180-212)/(77-90) 182/88     Weight: 102.7 kg (226 lb 8.4 oz)     Body mass index is 29.89 kg/m².    Physical Exam   Constitutional: He is oriented to person, place, and time. He appears well-developed and well-nourished.   HENT:   Head: Normocephalic and atraumatic.   Eyes: Pupils are equal, round,  "and reactive to light. EOM are normal. No scleral icterus.   Neck: Neck supple. No thyromegaly present.   Cardiovascular: Normal rate, regular rhythm and normal heart sounds.   No murmur heard.  L chest wall TTP   Pulmonary/Chest: Effort normal and breath sounds normal. No respiratory distress. He has no wheezes. He has no rales.   Abdominal: Soft. Bowel sounds are normal. He exhibits no distension. There is no tenderness.   Musculoskeletal: He exhibits no edema or deformity.   Lymphadenopathy:     He has no cervical adenopathy.   Neurological: He is alert and oriented to person, place, and time.   Skin: Skin is warm and dry.       Significant Labs:  CBC:   Recent Labs   Lab 03/23/20 0338   WBC 8.68   RBC 4.76   HGB 12.0*   HCT 39.8*   *   MCV 84   MCH 25.2*   MCHC 30.2*     CMP:   Recent Labs   Lab 03/23/20 0338      CALCIUM 7.9*   ALBUMIN 3.5   PROT 6.6      K 3.6   CO2 24      BUN 25*   CREATININE 1.9*   ALKPHOS 68   ALT 21   AST 23       Diagnostics:  Imaging Results          X-Ray Chest AP Portable (Final result)  Result time 03/23/20 04:00:58    Final result by Sd Altamirano MD (03/23/20 04:00:58)                 Impression:      No acute cardiopulmonary finding identified on this single view.      Electronically signed by: Sd Altamirano MD  Date:    03/23/2020  Time:    04:00             Narrative:    EXAMINATION:  XR CHEST AP PORTABLE    CLINICAL HISTORY:  Provided history is "Chest Pain;  ".    TECHNIQUE:  One view of the chest.    COMPARISON:  CT chest, 02/11/2020.  Chest radiograph, 06/16/2019 and 09/24/2017.    FINDINGS:  Cardiac wires overlie the chest.  Lung volumes are relatively low.  Bilateral costophrenic angles are partially excluded from the field of view.  Scattered calcified granulomata are present.  There is no focal consolidation.  No sizable pleural effusion.  No pneumothorax.                                "

## 2020-03-23 NOTE — ED TRIAGE NOTES
Ibrahima Phelps, a 64 y.o. male presents to the ED w/ complaint of chest pain that started at 1400 yesterday. Pt states that pain starts on left side and radiates to the right. Pt states that this has happen to him before, pt has PMH of pericarditis and this feels exactly the same. Pt also having some sob. Pt denies nausea denies vomiting.      Triage note:  Chief Complaint   Patient presents with    Chest Pain     chest soreness radiates to his back onset 3:30pm. denies taking pain medicine. hx of pericarditis and states it feels the same. +sob     Review of patient's allergies indicates:   Allergen Reactions    Ace inhibitors Swelling    Verapamil Other (See Comments)     Other reaction(s): Unknown    Povidone-iodine Itching     Past Medical History:   Diagnosis Date    (HFpEF) heart failure with preserved ejection fraction 9/25/2017    Atrial fibrillation     CAD (coronary artery disease)     CKD (chronic kidney disease) stage 3, GFR 30-59 ml/min     Dr. Latif    Diverticulosis     Fever blister     Heart attack 2006    Hyperlipidemia     Hypertension     Immunodeficiency due to treatment with immunosuppressive medication     Keloid cicatrix     Metabolic bone disease     Pericarditis     S/P kidney transplant     Supraventricular tachycardia 06/2019    Thrombocytopenia     Thyroid disease     Unspecified disorder of kidney and ureter     Stage IIICKD     Patient identifiers verified and correct for Ibrahima Phelps     LOC: The patient is awake, alert and aware of environment with an appropriate affect, the patient is oriented x 3 and speaking appropriately.   APPEARANCE: Patient appears comfortable and in no acute distress, patient is clean and well groomed.  SKIN: The skin is warm and dry, color consistent with ethnicity, patient has normal skin turgor and moist mucus membranes, skin intact, no breakdown or bruising noted.   MUSCULOSKELETAL: Patient moving all extremities spontaneously, no  swelling noted.  RESPIRATORY: Airway is open and patent, respirations are spontaneous, patient has a normal effort and rate, no accessory muscle use noted, pt placed on continuous pulse ox with O2 sats noted at 97% on room air.  CARDIAC: Pt placed on cardiac monitor. Patient has a normal rate and regular rhythm, no edema noted, capillary refill < 3 seconds. Pt has left sided chest pain 10/10 that radiates to right side. Pt also says chest pain worsens while breathing   GASTRO: Soft and non tender to palpation, no distention noted, normoactive bowel sounds present in all four quadrants. Pt states bowel movements have been regular.  : Pt denies any pain or frequency with urination.  NEURO: Pt opens eyes spontaneously, behavior appropriate to situation, follows commands, facial expression symmetrical, bilateral hand grasp equal and even, purposeful motor response noted, normal sensation in all extremities when touched with a finger.

## 2020-03-23 NOTE — PLAN OF CARE
A A O x 4. Free of falls, traumas and injuries. Skin intact. Denies shortness of breath, chest pain, nausea, vomiting. Echo done today. Plan of care reviewed with patient. Vital signs stable. Pain monitored. Will continue to monitor.

## 2020-03-23 NOTE — ASSESSMENT & PLAN NOTE
- Resume home nifedipine, carvedilol and doxazosin  - Held spironolactone in setting of creatinine rise

## 2020-03-23 NOTE — CONSULTS
[facility]  Kidney Transplant  Consult Note    Inpatient consult to Kidney/Pancreas Transplant Medicine  Consult performed by: Adrianna Cummings MD  Consult ordered by: Cherelle Cho DO            Subjective:     History of Present Illness:   63 yro M with PMH of ESRD 2/2 unclear etiology (started RRT~Jan 1992, received DDRT 6/1992 at [facility] [place] which failed 2/2 rejection, resumed RRT 4/1996 until receiving DDRT #2 CMV D-/R+ 4/2005), atrial fibrillation (s/p DCCV in 4/2009 on warfarin), gout, CAD s/p stenting 2006, tobacco abuse, and pericarditis x3 episodes (last episode in Jan 2017) who presents to the ED 3/23/20 with the complaint of CP. The CP has been present since yesterday, is 10/10, and stabbing in nature and associated with SOB. Denies cough, fevers, chills, sick contacts, or recent travel. Pt reports CP is similar to past episodes of CP 2/2 pericarditis. In the ED, EKG and CXR without significant findings and Tn 0.05, trended down to 0.03. Pt received a dose of colchicine in ED for treatment of presumed pericarditis.    Additional KTM PMH:  He had transplant nephrectomy of DDRT #1 (possibly in 1997 vs 2005 around time of second transplant). He has CKD stage 2/3 - GFR 30-89 and his baseline creatinine is between 1.6 to 1.8. He takes mycophenolate mofetil, prednisone and tacrolimus for maintenance immunosuppression.     Past Medical and Surgical History: Mr. Phelps has a past medical history of (HFpEF) heart failure with preserved ejection fraction (9/25/2017), Atrial fibrillation, CAD (coronary artery disease), CKD (chronic kidney disease) stage 3, GFR 30-59 ml/min, Diverticulosis, Fever blister, Heart attack (2006), Hyperlipidemia, Hypertension, Immunodeficiency due to treatment with immunosuppressive medication, Keloid cicatrix, Metabolic bone disease, Pericarditis, S/P kidney transplant, Supraventricular tachycardia (06/2019), Thrombocytopenia, Thyroid disease, and Unspecified  disorder of kidney and ureter.  He has a past surgical history that includes Cholecystectomy; Cardioversion (04/30/15); Kidney transplant (2005); Parathyroidectomy; Colonoscopy (April 20, 2011); Coronary angioplasty with stent (9/13/2006); and Colonoscopy (N/A, 3/13/2020).    Past Social and Family History: Mr. Phelps reports that he has been smoking cigarettes. He has a 20.00 pack-year smoking history. He has never used smokeless tobacco. He reports that he does not drink alcohol or use drugs.His family history includes Heart disease in his father; Kidney disease in his brother and sister; Kidney failure in his brother and sister; No Known Problems in his brother, sister, sister, and sister; Thyroid disease in his mother.    Intake/Output - Last 3 Shifts     None           Review of Systems   Constitutional: Negative for chills and fever.   HENT: Negative for congestion and rhinorrhea.    Eyes: Negative for pain and visual disturbance.   Respiratory: Positive for shortness of breath. Negative for cough.    Cardiovascular: Positive for chest pain. Negative for palpitations and leg swelling.   Gastrointestinal: Negative for abdominal pain, constipation and diarrhea.   Endocrine: Negative for polydipsia and polyuria.   Genitourinary: Negative for dysuria and hematuria.   Musculoskeletal: Negative for arthralgias and myalgias.   Skin: Negative for rash and wound.   Neurological: Negative for dizziness, weakness and headaches.   Psychiatric/Behavioral: Negative for agitation and behavioral problems.     Objective:     Vital Signs (Most Recent):  Temp: 97 °F (36.1 °C) (03/23/20 0742)  Pulse: 63 (03/23/20 0742)  Resp: 18 (03/23/20 0742)  BP: (!) 182/88 (03/23/20 0744)  SpO2: 98 % (03/23/20 0742) Vital Signs (24h Range):  Temp:  [97 °F (36.1 °C)-98.5 °F (36.9 °C)] 97 °F (36.1 °C)  Pulse:  [58-63] 63  Resp:  [17-21] 18  SpO2:  [98 %-100 %] 98 %  BP: (180-212)/(77-90) 182/88     Weight: 102.7 kg (226 lb 8.4 oz)     Body mass  "index is 29.89 kg/m².    Physical Exam   Constitutional: He is oriented to person, place, and time. He appears well-developed and well-nourished.   HENT:   Head: Normocephalic and atraumatic.   Eyes: Pupils are equal, round, and reactive to light. EOM are normal. No scleral icterus.   Neck: Neck supple. No thyromegaly present.   Cardiovascular: Normal rate, regular rhythm and normal heart sounds.   No murmur heard.  L chest wall TTP   Pulmonary/Chest: Effort normal and breath sounds normal. No respiratory distress. He has no wheezes. He has no rales.   Abdominal: Soft. Bowel sounds are normal. He exhibits no distension. There is no tenderness.   Musculoskeletal: He exhibits no edema or deformity.   Lymphadenopathy:     He has no cervical adenopathy.   Neurological: He is alert and oriented to person, place, and time.   Skin: Skin is warm and dry.       Significant Labs:  CBC:   Recent Labs   Lab 03/23/20  0338   WBC 8.68   RBC 4.76   HGB 12.0*   HCT 39.8*   *   MCV 84   MCH 25.2*   MCHC 30.2*     CMP:   Recent Labs   Lab 03/23/20  0338      CALCIUM 7.9*   ALBUMIN 3.5   PROT 6.6      K 3.6   CO2 24      BUN 25*   CREATININE 1.9*   ALKPHOS 68   ALT 21   AST 23       Diagnostics:  Imaging Results          X-Ray Chest AP Portable (Final result)  Result time 03/23/20 04:00:58    Final result by Sd Altamirano MD (03/23/20 04:00:58)                 Impression:      No acute cardiopulmonary finding identified on this single view.      Electronically signed by: Sd Altamirano MD  Date:    03/23/2020  Time:    04:00             Narrative:    EXAMINATION:  XR CHEST AP PORTABLE    CLINICAL HISTORY:  Provided history is "Chest Pain;  ".    TECHNIQUE:  One view of the chest.    COMPARISON:  CT chest, 02/11/2020.  Chest radiograph, 06/16/2019 and 09/24/2017.    FINDINGS:  Cardiac wires overlie the chest.  Lung volumes are relatively low.  Bilateral costophrenic angles are partially excluded from the " field of view.  Scattered calcified granulomata are present.  There is no focal consolidation.  No sizable pleural effusion.  No pneumothorax.                                  Assessment/Plan:     * Chest pain  -suspected pericarditis  -plan per primary team      Immunosuppressive management encounter following kidney transplant  -continue home  BID  -continue home prograf 2/2  -continue home prednisone 5mg   -daily prograf levels  -will monitor for toxicities associated with immunosuppressive              H/O kidney transplant  63 yro M s/p kidney transplant 2015 (ESRD 2/2 unclear etiology; started RRT~Jan 1992, received DDRT 6/1992 at [facility] [place] which failed 2/2 rejection, resumed RRT 4/1996 until receiving DDRT #2 CMV D-/R+ 4/2005), atrial fibrillation, CAD s/p stenting 2006, and pericarditis (last episode in Jan 2017) who presents to the ED 3/23/20 with the complaint of CP similar in quality to past episodes of pericarditis, received colchicine in ED.     -baseline Cr 1.6 - 2.0  -Cr on admission 1.9  -continue home  BID  -continue home prograf 2/2  -continue home prednisone 5mg   -daily prograf levels  -will monitor for toxicities associated with immunosuppressive agents  -Trend RFPs, chart IOs, renally dose medications, avoid nephrotoxic agents  -will continue to follow patient  -Please see attending attestation from Dr Sampson to follow             Adrianna Cummings MD  Kidney Transplant  [facility]

## 2020-03-23 NOTE — ASSESSMENT & PLAN NOTE
- Consult to KTM placed- appreciate assistance   - Resume home prednisone and prograf   - Held cellcept- will check with KTM  - Daily tacro levels ordered

## 2020-03-23 NOTE — HPI
63 yro M with PMH of ESRD 2/2 unclear etiology (started RRT~Jan 1992, received DDRT 6/1992 at [facility] [place] [place] which failed 2/2 rejection, resumed RRT 4/1996 until receiving DDRT #2 CMV D-/R+ 4/2005), atrial fibrillation (s/p DCCV in 4/2009 on warfarin), gout, CAD s/p stenting 2006, tobacco abuse, and pericarditis x3 episodes (last episode in Jan 2017) who presents to the ED 3/23/20 with the complaint of CP. The CP has been present since yesterday, is 10/10, and stabbing in nature and associated with SOB. Denies cough, fevers, chills, sick contacts, or recent travel. Pt reports CP is similar to past episodes of CP 2/2 pericarditis. In the ED, EKG and CXR without significant findings and Tn 0.05, trended down to 0.03. Pt received a dose of colchicine in ED for treatment of presumed pericarditis.    Additional KTM PMH:  He had transplant nephrectomy of DDRT #1 (possibly in 1997 vs 2005 around time of second transplant). He has CKD stage 2/3 - GFR 30-89 and his baseline creatinine is between 1.6 to 1.8. He takes mycophenolate mofetil, prednisone and tacrolimus for maintenance immunosuppression.

## 2020-03-23 NOTE — ASSESSMENT & PLAN NOTE
-continue home  BID  -continue home prograf 2/2  -continue home prednisone 5mg   -daily prograf levels  -will monitor for toxicities associated with immunosuppressive

## 2020-03-23 NOTE — ED PROVIDER NOTES
Encounter Date: 3/23/2020       History     Chief Complaint   Patient presents with    Chest Pain     chest soreness radiates to his back onset 3:30pm. denies taking pain medicine. hx of pericarditis and states it feels the same. +sob     Patient is a 64 year old male with PMHX of CAD, Afib on coumadin, hx of pericarditis, HTN, HLD, CKD stage 3, hx of kidney transplant 2005, and thyroid disease. He presents to the ED for chest pain. He reports having sternal chest pain onset today at 2:00PM at rest. Describes pain as constant and stabbing. Rates pain 10/10. Denies OTC medication use. Reports pain radiation through to back. Hx of similar pain in past with pericarditis. Reports hx of MI and cardiac stents. Reports associated SOB. Denies fever or cough. Denies recent travel out of the state or [place]. Denies known COVID exposures. Denies home oxygen use. He denies hx of PE or DVT, recent surgery, trauma, immobilization, initiation of hormone therapy, or active cancer. He denies fever,chills, nausea, vomiting, abd pain, dysuria, diarrhea, or constipation. He is a current everyday smoker and denies alcohol use.    The history is provided by the patient and medical records. No  was used.     Review of patient's allergies indicates:   Allergen Reactions    Ace inhibitors Swelling    Verapamil Other (See Comments)     Other reaction(s): Unknown    Povidone-iodine Itching     Past Medical History:   Diagnosis Date    (HFpEF) heart failure with preserved ejection fraction 9/25/2017    Atrial fibrillation     CAD (coronary artery disease)     CKD (chronic kidney disease) stage 3, GFR 30-59 ml/min     Dr. Latif    Diverticulosis     Fever blister     Heart attack 2006    Hyperlipidemia     Hypertension     Immunodeficiency due to treatment with immunosuppressive medication     Keloid cicatrix     Metabolic bone disease     Pericarditis     S/P kidney transplant     Supraventricular  tachycardia 06/2019    Thrombocytopenia     Thyroid disease     Unspecified disorder of kidney and ureter     Stage IIICKD     Past Surgical History:   Procedure Laterality Date    CARDIOVERSION  04/30/15    CHOLECYSTECTOMY      COLONOSCOPY  April 20, 2011    Diverticulosis, repeat recommended in 3 yrs., repeat colonoscopy 2014 revealed 2 polyps.  He should return in 5 years.    COLONOSCOPY N/A 3/13/2020    Procedure: COLONOSCOPY;  Surgeon: Chon Casper MD;  Location: [facility] ([address] [place] [address]);  Service: Endoscopy;  Laterality: N/A;  ok to hold coumadin x5days-see telephone encounter 2/4/20-tb    CORONARY ANGIOPLASTY WITH STENT PLACEMENT  9/13/2006    KIDNEY TRANSPLANT  2005    PARATHYROIDECTOMY       Family History   Problem Relation Age of Onset    No Known Problems Sister     No Known Problems Brother     Thyroid disease Mother         s/p surgery    Heart disease Father         had pacemaker    No Known Problems Sister     Kidney failure Sister     Kidney disease Sister     No Known Problems Sister     Kidney disease Brother     Kidney failure Brother         s/p transplant    Diabetes Mellitus Neg Hx     Stroke Neg Hx     Heart attack Neg Hx     Cancer Neg Hx     Celiac disease Neg Hx     Cirrhosis Neg Hx     Colon cancer Neg Hx     Esophageal cancer Neg Hx     Inflammatory bowel disease Neg Hx     Rectal cancer Neg Hx     Stomach cancer Neg Hx     Ulcerative colitis Neg Hx     Liver disease Neg Hx     Liver cancer Neg Hx     Crohn's disease Neg Hx     Melanoma Neg Hx      Social History     Tobacco Use    Smoking status: Current Some Day Smoker     Packs/day: 0.50     Years: 40.00     Pack years: 20.00     Types: Cigarettes    Smokeless tobacco: Never Used    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   Substance Use Topics    Alcohol use: No    Drug use: No     Review of Systems   Constitutional: Negative for fever.    HENT: Negative for sore throat.    Respiratory: Positive for shortness of breath.    Cardiovascular: Positive for chest pain.   Gastrointestinal: Negative for abdominal pain, nausea and vomiting.   Genitourinary: Negative for dysuria.   Musculoskeletal: Negative for back pain.   Skin: Negative for rash.   Neurological: Negative for weakness.   Hematological: Does not bruise/bleed easily.       Physical Exam     Initial Vitals [03/23/20 0311]   BP Pulse Resp Temp SpO2   (!) 184/85 62 (!) 21 98.5 °F (36.9 °C) 98 %      MAP       --         Physical Exam    Vitals reviewed.  Constitutional: He appears well-developed and well-nourished. No distress.   HENT:   Head: Normocephalic.   Eyes: Conjunctivae are normal.   Neck: Normal range of motion.   Cardiovascular: Normal rate and regular rhythm.   No murmur heard.  Pulses:       Radial pulses are 2+ on the right side, and 2+ on the left side.        Dorsalis pedis pulses are 2+ on the right side, and 2+ on the left side.   Pulmonary/Chest: No respiratory distress. He has no wheezes. He has rales.   Pain reproducible with palpation over left chest.   Abdominal: Soft. Bowel sounds are normal. He exhibits no distension. There is no tenderness.   Musculoskeletal: Normal range of motion. He exhibits no edema.   B/l LE symmetric in size without TTP   Neurological: He is alert and oriented to person, place, and time.   Skin: Skin is warm and dry. No erythema.         ED Course   ED US Echo  Date/Time: 3/23/2020 4:06 AM  Performed by: Yudith Pollock PA-C  Authorized by: Wilber Coleman MD   Comments: Small trace pericardial effusion. Negative McConnel's sign. Normal LV output.        Labs Reviewed   CBC W/ AUTO DIFFERENTIAL - Abnormal; Notable for the following components:       Result Value    Hemoglobin 12.0 (*)     Hematocrit 39.8 (*)     Mean Corpuscular Hemoglobin 25.2 (*)     Mean Corpuscular Hemoglobin Conc 30.2 (*)     RDW 14.6 (*)     Platelets 139 (*)      "Lymph% 16.5 (*)     Platelet Estimate Decreased (*)     All other components within normal limits   COMPREHENSIVE METABOLIC PANEL - Abnormal; Notable for the following components:    BUN, Bld 25 (*)     Creatinine 1.9 (*)     Calcium 7.9 (*)     eGFR if  41.8 (*)     eGFR if non  36.2 (*)     All other components within normal limits   TROPONIN I - Abnormal; Notable for the following components:    Troponin I 0.053 (*)     All other components within normal limits   B-TYPE NATRIURETIC PEPTIDE - Abnormal; Notable for the following components:     (*)     All other components within normal limits   PROTIME-INR - Abnormal; Notable for the following components:    Prothrombin Time 15.6 (*)     INR 1.6 (*)     All other components within normal limits   MAGNESIUM   SEDIMENTATION RATE   C-REACTIVE PROTEIN   TROPONIN I   ISTAT CHEM8          Imaging Results          X-Ray Chest AP Portable (Final result)  Result time 03/23/20 04:00:58    Final result by Sd Altamirano MD (03/23/20 04:00:58)                 Impression:      No acute cardiopulmonary finding identified on this single view.      Electronically signed by: Sd Altamirano MD  Date:    03/23/2020  Time:    04:00             Narrative:    EXAMINATION:  XR CHEST AP PORTABLE    CLINICAL HISTORY:  Provided history is "Chest Pain;  ".    TECHNIQUE:  One view of the chest.    COMPARISON:  CT chest, 02/11/2020.  Chest radiograph, 06/16/2019 and 09/24/2017.    FINDINGS:  Cardiac wires overlie the chest.  Lung volumes are relatively low.  Bilateral costophrenic angles are partially excluded from the field of view.  Scattered calcified granulomata are present.  There is no focal consolidation.  No sizable pleural effusion.  No pneumothorax.                                 Medical Decision Making:   History:   Old Medical Records: I decided to obtain old medical records.  Independently Interpreted Test(s):   I have ordered and " independently interpreted X-rays - see summary below.  Clinical Tests:   Lab Tests: Ordered and Reviewed  Radiological Study: Ordered and Reviewed  Medical Tests: Ordered and Reviewed  Other:   I have discussed this case with another health care provider.       <> Summary of the Discussion: Case discussed with hospital medicine for admission       APC / Resident Notes:   Patient is a 64 year old male presents to the ED for emergent evaluation of chest pain.     Will order labs and imaging. Will order ASA and pain medication for symptomatic relief. Will continue to monitor.     Differential diagnoses include, but are not limited to: ACS, pericarditis, pericardial effusion, cardiac arrhythmia, or electrolyte imbalance.     No leukocytosis. Hemodynamically stable. Thrombocytopenia. Elevated BUN 25 and Cr 1.9 patient with known hx of CKD stage 3 s/p kidney transplant. Elevated troponin 0.053. Subtherapeutic INR 1.6. CXR found to have no acute cardiopulmonary process. Will treat as pericarditis with colchicine.     Will admit to medicine for further management given patient hx of immunosuppression, chronic anticoagulation, and elevated cardiac enzyme. Serial troponin pending. Patient to benefit from cardiology and KTM consults and echo during admission.     I have discussed and reviewed with my supervising physician.        Clinical Impression:       ICD-10-CM ICD-9-CM   1. Chest pain, unspecified type R07.9 786.50   2. Chest pain R07.9 786.50   3. Hx of kidney transplant Z94.0 V42.0   4. Subtherapeutic international normalized ratio (INR) R79.1 790.92         Disposition:   Disposition: Admitted  Condition: Fair     ED Disposition Condition    Admit                           Yudith Pollock PA-C  03/23/20 0655

## 2020-03-23 NOTE — PROGRESS NOTES
Pt CO CP; notified team; EKG and Trop performed; NGT X1 given; CP subsided; VSS; Will cont to monitor.

## 2020-03-23 NOTE — ASSESSMENT & PLAN NOTE
63 yro M s/p kidney transplant 2015 (ESRD 2/2 unclear etiology; started RRT~Jan 1992, received DDRT 6/1992 at [facility] [place] which failed 2/2 rejection, resumed RRT 4/1996 until receiving DDRT #2 CMV D-/R+ 4/2005), atrial fibrillation, CAD s/p stenting 2006, and pericarditis (last episode in Jan 2017) who presents to the ED 3/23/20 with the complaint of CP similar in quality to past episodes of pericarditis, received colchicine in ED.     -baseline Cr 1.6 - 2.0  -Cr on admission 1.9  -continue home  BID  -continue home prograf 2/2  -continue home prednisone 5mg   -daily prograf levels  -will monitor for toxicities associated with immunosuppressive agents  -Trend RFPs, chart IOs, renally dose medications, avoid nephrotoxic agents  -will continue to follow patient  -Please see attending attestation from Dr Sampson to follow

## 2020-03-23 NOTE — ASSESSMENT & PLAN NOTE
DDX: HTN Emergency vs  Costochondritis vs ACS/UA vs Shingles (less likely as not dermatomal on exam) vs Aortic Dissection/AAA vs Pericarditis (formal echo without evidence of effusion, inflammatory markers wnl)     - Pending repeat EKG- unclear ST depressions in leads V5 and V6 on 1/2 EKG's in ED   - Troponin down trending on repeat  - Echo completed on 3/23- Concentric left ventricular remodeling. EF 60%. Normal LV diastolic function. Aortic sclerosis without significant stenosis. Intermediate central venous pressure (8 mmHg). No pericardial effusion noted.   - Pain control with prn Tylenol for moderate pain and morphine for severe pain for reproducible chest pain   - Will check blood pressure in both arms

## 2020-03-23 NOTE — H&P
[facility] Hospital Medicine  History & Physical    Patient Name: Ibrahima Phelps  MRN: [id number]  Admission Date: 3/23/2020  Attending Physician: Cielo Hernandez MD   Primary Care Provider: Connie Magdaleno MD    Hospital Medicine Team: [facility] MED 3 Cherelle Cho DO     Patient information was obtained from patient and ER records.     Subjective:     Principal Problem:Chest pain    Chief Complaint:   Chief Complaint   Patient presents with    Chest Pain     chest soreness radiates to his back onset 3:30pm. denies taking pain medicine. hx of pericarditis and states it feels the same. +sob        HPI: The pt is a 63 yo AA M with CAD (s/p stent 2005), paroxysmal atrial fibrillation (on coumadin 5 mg QD except M,Th 2.5 mg), history of pericarditis (2 prior episodes, remote, unrelated to MI), HTN, HLD, CKD III (s/p renal transplant in 2005, on Prograf and Cellcept), MI, and SVT (06/2019) that presents for evaluation of acute chest pain. The patient reports a sternal chest pain which he qualifies as sharp and stabbing in quality- rated 10/10 at onset with radiation to the back between his shoulder blades. Reports chest pain began on 3/22 around noon and was unrelenting throughout the day, unrelated to activity or position, but worse with deep inspiration. Reports associated SOB, no history of lung disease or home oxygen use. He was able to go to bed, but the pain woke him up- which prompted his ED visit. He states it feels similar to prior episode of pericarditis. Denies recent fevers, chills, sick contacts, or GI upset. Reports he has been taking all his home medications as prescribed, no missed doses. Denies history of heart failure, though reports swelling in his legs is greater than usual. Denies any unilateral leg pain, swelling, or skin changes to LE's. He denies any trauma. Recently washed car, unsure if he strained muscles doing so. Has had chickenpox, no shingles vaccination or episodes.  Denies any skin changes over chest.     In the ED, he had EKG's with NSR with PAC's, no STEMI, though unclear ST depressions in leads V5 and V6 on 1/2 EKG's. Troponin at 0.053, repeat down to 0.031. Hgb 12, BUN 25, Cr 1.9 (BL unclear- 1.5-2), INR 1.6 and . CRP and ESR wnl. CXR with no acute cardiopulmonary findings, no consolidation, PTX or pleural effusion. Bedside echo performed with showed small trace pericardial effusion- negative White's sign and normal LV output.     Past Medical History:   Diagnosis Date    (HFpEF) heart failure with preserved ejection fraction 9/25/2017    Atrial fibrillation     CAD (coronary artery disease)     CKD (chronic kidney disease) stage 3, GFR 30-59 ml/min     Dr. Latif    Diverticulosis     Fever blister     Heart attack 2006    Hyperlipidemia     Hypertension     Immunodeficiency due to treatment with immunosuppressive medication     Keloid cicatrix     Metabolic bone disease     Pericarditis     S/P kidney transplant     Supraventricular tachycardia 06/2019    Thrombocytopenia     Thyroid disease     Unspecified disorder of kidney and ureter     Stage IIICKD       Past Surgical History:   Procedure Laterality Date    CARDIOVERSION  04/30/15    CHOLECYSTECTOMY      COLONOSCOPY  April 20, 2011    Diverticulosis, repeat recommended in 3 yrs., repeat colonoscopy 2014 revealed 2 polyps.  He should return in 5 years.    COLONOSCOPY N/A 3/13/2020    Procedure: COLONOSCOPY;  Surgeon: Chon Casper MD;  Location: [facility] ([address] [place] [address]);  Service: Endoscopy;  Laterality: N/A;  ok to hold coumadin x5days-see telephone encounter 2/4/20-tb    CORONARY ANGIOPLASTY WITH STENT PLACEMENT  9/13/2006    KIDNEY TRANSPLANT  2005    PARATHYROIDECTOMY         Review of patient's allergies indicates:   Allergen Reactions    Ace inhibitors Swelling    Verapamil Other (See Comments)     Other reaction(s): Unknown    Povidone-iodine Itching       No current  facility-administered medications on file prior to encounter.      Current Outpatient Medications on File Prior to Encounter   Medication Sig    acetaminophen (TYLENOL) 325 MG tablet Take 2 tablets (650 mg total) by mouth every 4 (four) hours as needed.    albuterol (ACCUNEB) 1.25 mg/3 mL Nebu Take 3 mLs (1.25 mg total) by nebulization every 6 (six) hours as needed. Rescue    aspirin (ADULT ASPIRIN EC LOW STRENGTH) 81 MG EC tablet Take 1 tablet by mouth Daily.    atorvastatin (LIPITOR) 10 MG tablet Take 1 tablet (10 mg total) by mouth once daily.    calcium carbonate (OS-TRISH) 500 mg calcium (1,250 mg) tablet Two by mouth twice a day    carvediloL (COREG) 25 MG tablet Take 1 tablet (25 mg total) by mouth 2 (two) times daily with meals.    colchicine (COLCRYS) 0.6 mg tablet One po BID up to three days prn gout flare    doxazosin (CARDURA) 4 MG tablet Take 1 tablet (4 mg total) by mouth every 12 (twelve) hours.    fexofenadine (ALLEGRA) 60 MG tablet Take 1 tablet by mouth Twice daily as needed.    fluticasone propionate (FLONASE) 50 mcg/actuation nasal spray 1 spray (50 mcg total) by Each Nostril route once daily.    furosemide (LASIX) 40 MG tablet Take 0.5 tablets (20 mg total) by mouth 2 (two) times daily.    gabapentin (NEURONTIN) 300 MG capsule Take 1 capsule (300 mg total) by mouth 2 (two) times daily.    multivitamin (THERAGRAN) per tablet Take 1 tablet by mouth Daily.    mycophenolate (CELLCEPT) 250 mg Cap Take 2 capsules (500 mg total) by mouth 2 (two) times daily.    nicotine (NICODERM CQ) 21 mg/24 hr Place 1 patch onto the skin once daily.    NIFEdipine (PROCARDIA-XL) 90 MG (OSM) 24 hr tablet Take 1 tablet (90 mg total) by mouth once daily.    paricalcitol (ZEMPLAR) 1 MCG capsule One po MW    potassium chloride (KLOR-CON) 10 MEQ TbSR Take 1 tablet (10 mEq total) by mouth once daily.    predniSONE (DELTASONE) 5 MG tablet Take 1 tablet (5 mg total) by mouth every morning.    ranitidine  (ZANTAC) 150 MG tablet Take 1 tablet (150 mg total) by mouth 2 (two) times daily.    spironolactone (ALDACTONE) 25 MG tablet Take 1 tablet (25 mg total) by mouth once daily.    tacrolimus (PROGRAF) 1 MG Cap Take 2 capsules (2 mg total) by mouth every 12 (twelve) hours. TAKE 2 CAPSULES BY MOUTH EVERY MORNING AND 2 CAPSULES EVERY EVENING.  Z94.0 kidney transplant    triamcinolone acetonide 0.1% (KENALOG) 0.1 % cream AAA bid to rash on ankles    vitamin D 1000 units Tab Take 370 mg by mouth 2 (two) times daily. 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   Substance and Sexual Activity    Alcohol use: No    Drug use: No    Sexual activity: Yes     Partners: Female     Review of Systems   Constitutional: Negative for chills and fever.   HENT: Negative for congestion and rhinorrhea.    Respiratory: Positive for shortness of breath. Negative for cough and wheezing.    Cardiovascular: Positive for chest pain and leg swelling. Negative for palpitations.   Gastrointestinal: Negative for abdominal distention, abdominal pain and diarrhea.   Genitourinary: Negative for dysuria, frequency and urgency.   Musculoskeletal: Positive for back pain. Negative for arthralgias.   Skin: Negative for pallor and rash.   Neurological: Negative for dizziness, weakness and light-headedness.   Psychiatric/Behavioral: Negative for agitation and  confusion.     Objective:     Vital Signs (Most Recent):  Temp: 97 °F (36.1 °C) (03/23/20 0742)  Pulse: 63 (03/23/20 0742)  Resp: 18 (03/23/20 0742)  BP: (!) 182/88 (03/23/20 0744)  SpO2: 99 % (03/23/20 0958) Vital Signs (24h Range):  Temp:  [97 °F (36.1 °C)-98.5 °F (36.9 °C)] 97 °F (36.1 °C)  Pulse:  [58-63] 63  Resp:  [17-21] 18  SpO2:  [98 %-100 %] 99 %  BP: (180-212)/(77-90) 182/88     Weight: 102.5 kg (226 lb)  Body mass index is 29.82 kg/m².    Physical Exam   Constitutional: He is oriented to person, place, and time. He appears well-developed and well-nourished. No distress.   HENT:   Head: Normocephalic and atraumatic.   Right Ear: External ear normal.   Left Ear: External ear normal.   Nose: Nose normal.   Eyes: Pupils are equal, round, and reactive to light. EOM are normal. Right eye exhibits no discharge. Left eye exhibits no discharge. No scleral icterus.   Neck: Normal range of motion. Neck supple. No tracheal deviation present.   Cardiovascular: Normal rate, regular rhythm, normal heart sounds and intact distal pulses. Exam reveals no friction rub.   Pulses:       Radial pulses are 2+ on the right side, and 2+ on the left side.   Reproducible chest pain along anterior right costal margin     Pulmonary/Chest: Effort normal. No stridor. No respiratory distress. He has rales (R >L, bibasilar).   On RA    Abdominal: Soft. Bowel sounds are normal. He exhibits no distension and no mass. There is no tenderness. There is no guarding.   No pulsatile abdominal mass    Musculoskeletal: Normal range of motion. He exhibits edema (2+ BLE R>L ).   Neurological: He is alert and oriented to person, place, and time.   Skin: Skin is warm and dry. No rash noted. He is not diaphoretic. No erythema. No pallor.   No clubbing noted to digits    Psychiatric: He has a normal mood and affect. His behavior is normal.   Nursing note and vitals reviewed.        CRANIAL NERVES     CN III, IV, VI   Pupils are equal, round, and  reactive to light.  Extraocular motions are normal.        Significant Labs: All pertinent labs within the past 24 hours have been reviewed.    Significant Imaging: I have reviewed and interpreted all pertinent imaging results/findings within the past 24 hours.    Assessment/Plan:     * Chest pain  DDX: HTN Emergency vs  Costochondritis vs ACS/UA vs Shingles (less likely as not dermatomal on exam) vs Aortic Dissection/AAA vs Pericarditis (formal echo without evidence of effusion, inflammatory markers wnl)     - Pending repeat EKG- unclear ST depressions in leads V5 and V6 on 1/2 EKG's in ED   - Troponin down trending on repeat  - Echo completed on 3/23- Concentric left ventricular remodeling. EF 60%. Normal LV diastolic function. Aortic sclerosis without significant stenosis. Intermediate central venous pressure (8 mmHg). No pericardial effusion noted.   - Pain control with prn Tylenol for moderate pain and morphine for severe pain for reproducible chest pain   - Will check blood pressure in both arms       Hypertensive emergency  - Resume home nifedipine, carvedilol and doxazosin  - Held spironolactone in setting of creatinine rise       Subtherapeutic international normalized ratio (INR)  - Daily INR  - Resume warfarin at 5 mg QD- home dose is 5 mg QD, except M/Th 2.5 mg       Immunosuppressive management encounter following kidney transplant  - See h/o kidney transplant       Atrial fibrillation  - Currently in NSR  - Continue coumadin       H/O kidney transplant  - Consult to KTM placed- appreciate assistance   - Resume home prednisone and prograf   - Held cellcept- will check with KTM  - Daily tacro levels ordered       Long term (current) use of anticoagulants [V58.61]  - INR subtherapeutic  - Coumadin 5 mg QD ordered  - Daily INR       Tobacco use  - Smoking cessation counseling to be provided       Acute on CKD (chronic kidney disease) stage 3, GFR 30-59 ml/min  - Unclear baseline- Cr at 1.9 (as low as 1.5  previously)- could be ATN vs cardiorenal vs 2/2 HTN emergency   - Pending UA   - Held spironolactone       Mixed hyperlipidemia  - Resume home statin       CAD (coronary artery disease)  - Resume statin  - Given loading dose ASA in ED, resume ASA 81 mg QD on 3/24     Immunodeficiency due to treatment with immunosuppressive medication          VTE Risk Mitigation (From admission, onward)         Ordered     warfarin (COUMADIN) tablet 5 mg  Daily      03/23/20 0854     Reason for No Pharmacological VTE Prophylaxis  Once     Question:  Reasons:  Answer:  Physician Provided (leave comment)  Comment:  Will resume home coumadin     03/23/20 0845     IP VTE HIGH RISK PATIENT  Once      03/23/20 0845                   Cherelle Cho DO  Department of Hospital Medicine   [facility]

## 2020-03-23 NOTE — NURSING
Patient transferred to room. Oriented to surroundings. /87, rechecked 182/88. No BP medications ordered. MD paged. Will continue to monitor.

## 2020-03-23 NOTE — SUBJECTIVE & OBJECTIVE
Past Medical History:   Diagnosis Date    (HFpEF) heart failure with preserved ejection fraction 9/25/2017    Atrial fibrillation     CAD (coronary artery disease)     CKD (chronic kidney disease) stage 3, GFR 30-59 ml/min     Dr. Latif    Diverticulosis     Fever blister     Heart attack 2006    Hyperlipidemia     Hypertension     Immunodeficiency due to treatment with immunosuppressive medication     Keloid cicatrix     Metabolic bone disease     Pericarditis     S/P kidney transplant     Supraventricular tachycardia 06/2019    Thrombocytopenia     Thyroid disease     Unspecified disorder of kidney and ureter     Stage IIICKD       Past Surgical History:   Procedure Laterality Date    CARDIOVERSION  04/30/15    CHOLECYSTECTOMY      COLONOSCOPY  April 20, 2011    Diverticulosis, repeat recommended in 3 yrs., repeat colonoscopy 2014 revealed 2 polyps.  He should return in 5 years.    COLONOSCOPY N/A 3/13/2020    Procedure: COLONOSCOPY;  Surgeon: Chon Casper MD;  Location: [facility] ([address] [place] [address]);  Service: Endoscopy;  Laterality: N/A;  ok to hold coumadin x5days-see telephone encounter 2/4/20-tb    CORONARY ANGIOPLASTY WITH STENT PLACEMENT  9/13/2006    KIDNEY TRANSPLANT  2005    PARATHYROIDECTOMY         Review of patient's allergies indicates:   Allergen Reactions    Ace inhibitors Swelling    Verapamil Other (See Comments)     Other reaction(s): Unknown    Povidone-iodine Itching       No current facility-administered medications on file prior to encounter.      Current Outpatient Medications on File Prior to Encounter   Medication Sig    acetaminophen (TYLENOL) 325 MG tablet Take 2 tablets (650 mg total) by mouth every 4 (four) hours as needed.    albuterol (ACCUNEB) 1.25 mg/3 mL Nebu Take 3 mLs (1.25 mg total) by nebulization every 6 (six) hours as needed. Rescue    aspirin (ADULT ASPIRIN EC LOW STRENGTH) 81 MG EC tablet Take 1 tablet by mouth Daily.    atorvastatin  (LIPITOR) 10 MG tablet Take 1 tablet (10 mg total) by mouth once daily.    calcium carbonate (OS-TRISH) 500 mg calcium (1,250 mg) tablet Two by mouth twice a day    carvediloL (COREG) 25 MG tablet Take 1 tablet (25 mg total) by mouth 2 (two) times daily with meals.    colchicine (COLCRYS) 0.6 mg tablet One po BID up to three days prn gout flare    doxazosin (CARDURA) 4 MG tablet Take 1 tablet (4 mg total) by mouth every 12 (twelve) hours.    fexofenadine (ALLEGRA) 60 MG tablet Take 1 tablet by mouth Twice daily as needed.    fluticasone propionate (FLONASE) 50 mcg/actuation nasal spray 1 spray (50 mcg total) by Each Nostril route once daily.    furosemide (LASIX) 40 MG tablet Take 0.5 tablets (20 mg total) by mouth 2 (two) times daily.    gabapentin (NEURONTIN) 300 MG capsule Take 1 capsule (300 mg total) by mouth 2 (two) times daily.    multivitamin (THERAGRAN) per tablet Take 1 tablet by mouth Daily.    mycophenolate (CELLCEPT) 250 mg Cap Take 2 capsules (500 mg total) by mouth 2 (two) times daily.    nicotine (NICODERM CQ) 21 mg/24 hr Place 1 patch onto the skin once daily.    NIFEdipine (PROCARDIA-XL) 90 MG (OSM) 24 hr tablet Take 1 tablet (90 mg total) by mouth once daily.    paricalcitol (ZEMPLAR) 1 MCG capsule One po MWF    potassium chloride (KLOR-CON) 10 MEQ TbSR Take 1 tablet (10 mEq total) by mouth once daily.    predniSONE (DELTASONE) 5 MG tablet Take 1 tablet (5 mg total) by mouth every morning.    ranitidine (ZANTAC) 150 MG tablet Take 1 tablet (150 mg total) by mouth 2 (two) times daily.    spironolactone (ALDACTONE) 25 MG tablet Take 1 tablet (25 mg total) by mouth once daily.    tacrolimus (PROGRAF) 1 MG Cap Take 2 capsules (2 mg total) by mouth every 12 (twelve) hours. TAKE 2 CAPSULES BY MOUTH EVERY MORNING AND 2 CAPSULES EVERY EVENING.  Z94.0 kidney transplant    triamcinolone acetonide 0.1% (KENALOG) 0.1 % cream AAA bid to rash on ankles    vitamin D 1000 units Tab Take 370 mg  by mouth 2 (two) times daily. 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   Substance and Sexual Activity    Alcohol use: No    Drug use: No    Sexual activity: Yes     Partners: Female     Review of Systems   Constitutional: Negative for chills and fever.   HENT: Negative for congestion and rhinorrhea.    Respiratory: Positive for shortness of breath. Negative for cough and wheezing.    Cardiovascular: Positive for chest pain and leg swelling. Negative for palpitations.   Gastrointestinal: Negative for abdominal distention, abdominal pain and diarrhea.   Genitourinary: Negative for dysuria, frequency and urgency.   Musculoskeletal: Positive for back pain. Negative for arthralgias.   Skin: Negative for pallor and rash.   Neurological: Negative for dizziness, weakness and light-headedness.   Psychiatric/Behavioral: Negative for agitation and confusion.     Objective:     Vital Signs (Most Recent):  Temp: 97 °F (36.1 °C) (03/23/20 0742)  Pulse: 63 (03/23/20 0742)  Resp: 18 (03/23/20 0742)  BP: (!) 182/88 (03/23/20 0744)  SpO2: 99 % (03/23/20 0958) Vital Signs (24h Range):  Temp:  [97 °F (36.1 °C)-98.5 °F (36.9 °C)] 97 °F (36.1 °C)  Pulse:  [58-63] 63  Resp:  [17-21] 18  SpO2:  [98 %-100 %] 99 %  BP: (180-212)/(77-90) 182/88     Weight: 102.5 kg (226 lb)  Body mass index is 29.82 kg/m².    Physical Exam   Constitutional: He  is oriented to person, place, and time. He appears well-developed and well-nourished. No distress.   HENT:   Head: Normocephalic and atraumatic.   Right Ear: External ear normal.   Left Ear: External ear normal.   Nose: Nose normal.   Eyes: Pupils are equal, round, and reactive to light. EOM are normal. Right eye exhibits no discharge. Left eye exhibits no discharge. No scleral icterus.   Neck: Normal range of motion. Neck supple. No tracheal deviation present.   Cardiovascular: Normal rate, regular rhythm, normal heart sounds and intact distal pulses. Exam reveals no friction rub.   Pulses:       Radial pulses are 2+ on the right side, and 2+ on the left side.   Reproducible chest pain along anterior right costal margin     Pulmonary/Chest: Effort normal. No stridor. No respiratory distress. He has rales (R >L, bibasilar).   On RA    Abdominal: Soft. Bowel sounds are normal. He exhibits no distension and no mass. There is no tenderness. There is no guarding.   No pulsatile abdominal mass    Musculoskeletal: Normal range of motion. He exhibits edema (2+ BLE R>L ).   Neurological: He is alert and oriented to person, place, and time.   Skin: Skin is warm and dry. No rash noted. He is not diaphoretic. No erythema. No pallor.   No clubbing noted to digits    Psychiatric: He has a normal mood and affect. His behavior is normal.   Nursing note and vitals reviewed.        CRANIAL NERVES     CN III, IV, VI   Pupils are equal, round, and reactive to light.  Extraocular motions are normal.        Significant Labs: All pertinent labs within the past 24 hours have been reviewed.    Significant Imaging: I have reviewed and interpreted all pertinent imaging results/findings within the past 24 hours.

## 2020-03-23 NOTE — ASSESSMENT & PLAN NOTE
- Unclear baseline- Cr at 1.9 (as low as 1.5 previously)- could be ATN vs cardiorenal vs 2/2 HTN emergency   - Pending UA   - Held spironolactone

## 2020-03-23 NOTE — HPI
The pt is a 63 yo AA M with CAD (s/p stent 2005), paroxysmal atrial fibrillation (on coumadin 5 mg QD except M,Th 2.5 mg), history of pericarditis (2 prior episodes, remote, unrelated to MI), HTN, HLD, CKD III (s/p renal transplant in 2005, on Prograf and Cellcept), MI, and SVT (06/2019) that presents for evaluation of acute chest pain. The patient reports a sternal chest pain which he qualifies as sharp and stabbing in quality- rated 10/10 at onset with radiation to the back between his shoulder blades. Reports chest pain began on 3/22 around noon and was unrelenting throughout the day, unrelated to activity or position, but worse with deep inspiration. Reports associated SOB, no history of lung disease or home oxygen use. He was able to go to bed, but the pain woke him up- which prompted his ED visit. He states it feels similar to prior episode of pericarditis. Denies recent fevers, chills, sick contacts, or GI upset. Reports he has been taking all his home medications as prescribed, no missed doses. Denies history of heart failure, though reports swelling in his legs is greater than usual. Denies any unilateral leg pain, swelling, or skin changes to LE's. He denies any trauma. Recently washed car, unsure if he strained muscles doing so. Has had chickenpox, no shingles vaccination or episodes. Denies any skin changes over chest.     In the ED, he had EKG's with NSR with PAC's, no STEMI, though unclear ST depressions in leads V5 and V6 on 1/2 EKG's. Troponin at 0.053, repeat down to 0.031. Hgb 12, BUN 25, Cr 1.9 (BL unclear- 1.5-2), INR 1.6 and . CRP and ESR wnl. CXR with no acute cardiopulmonary findings, no consolidation, PTX or pleural effusion. Bedside echo performed with showed small trace pericardial effusion- negative White's sign and normal LV output.

## 2020-03-24 VITALS
WEIGHT: 224.19 LBS | BODY MASS INDEX: 29.71 KG/M2 | DIASTOLIC BLOOD PRESSURE: 72 MMHG | HEART RATE: 68 BPM | TEMPERATURE: 98 F | RESPIRATION RATE: 18 BRPM | HEIGHT: 73 IN | OXYGEN SATURATION: 93 % | SYSTOLIC BLOOD PRESSURE: 131 MMHG

## 2020-03-24 LAB
ALBUMIN SERPL BCP-MCNC: 3 G/DL (ref 3.5–5.2)
ALP SERPL-CCNC: 53 U/L (ref 55–135)
ALT SERPL W/O P-5'-P-CCNC: 16 U/L (ref 10–44)
ANION GAP SERPL CALC-SCNC: 12 MMOL/L (ref 8–16)
APTT BLDCRRT: 38 SEC (ref 21–32)
AST SERPL-CCNC: 17 U/L (ref 10–40)
BASOPHILS # BLD AUTO: 0.01 K/UL (ref 0–0.2)
BASOPHILS NFR BLD: 0.1 % (ref 0–1.9)
BILIRUB SERPL-MCNC: 0.3 MG/DL (ref 0.1–1)
BUN SERPL-MCNC: 25 MG/DL (ref 8–23)
CALCIUM SERPL-MCNC: 7.4 MG/DL (ref 8.7–10.5)
CHLORIDE SERPL-SCNC: 104 MMOL/L (ref 95–110)
CO2 SERPL-SCNC: 25 MMOL/L (ref 23–29)
CREAT SERPL-MCNC: 1.9 MG/DL (ref 0.5–1.4)
DIFFERENTIAL METHOD: ABNORMAL
DIGOXIN SERPL-MCNC: <0.1 NG/ML (ref 0.8–2)
EOSINOPHIL # BLD AUTO: 0.2 K/UL (ref 0–0.5)
EOSINOPHIL NFR BLD: 2.1 % (ref 0–8)
ERYTHROCYTE [DISTWIDTH] IN BLOOD BY AUTOMATED COUNT: 14.6 % (ref 11.5–14.5)
EST. GFR  (AFRICAN AMERICAN): 41.8 ML/MIN/1.73 M^2
EST. GFR  (NON AFRICAN AMERICAN): 36.2 ML/MIN/1.73 M^2
GLUCOSE SERPL-MCNC: 92 MG/DL (ref 70–110)
HCT VFR BLD AUTO: 35.4 % (ref 40–54)
HGB BLD-MCNC: 10.5 G/DL (ref 14–18)
IMM GRANULOCYTES # BLD AUTO: 0.03 K/UL (ref 0–0.04)
IMM GRANULOCYTES NFR BLD AUTO: 0.3 % (ref 0–0.5)
INR PPP: 2 (ref 0.8–1.2)
LYMPHOCYTES # BLD AUTO: 1.4 K/UL (ref 1–4.8)
LYMPHOCYTES NFR BLD: 14.2 % (ref 18–48)
MAGNESIUM SERPL-MCNC: 2 MG/DL (ref 1.6–2.6)
MCH RBC QN AUTO: 24.6 PG (ref 27–31)
MCHC RBC AUTO-ENTMCNC: 29.7 G/DL (ref 32–36)
MCV RBC AUTO: 83 FL (ref 82–98)
MONOCYTES # BLD AUTO: 1 K/UL (ref 0.3–1)
MONOCYTES NFR BLD: 10.6 % (ref 4–15)
NEUTROPHILS # BLD AUTO: 7.1 K/UL (ref 1.8–7.7)
NEUTROPHILS NFR BLD: 72.7 % (ref 38–73)
NRBC BLD-RTO: 0 /100 WBC
PHOSPHATE SERPL-MCNC: 3.9 MG/DL (ref 2.7–4.5)
PLATELET # BLD AUTO: 119 K/UL (ref 150–350)
PMV BLD AUTO: ABNORMAL FL (ref 9.2–12.9)
POTASSIUM SERPL-SCNC: 3.5 MMOL/L (ref 3.5–5.1)
PROT SERPL-MCNC: 6 G/DL (ref 6–8.4)
PROTHROMBIN TIME: 19.4 SEC (ref 9–12.5)
RBC # BLD AUTO: 4.26 M/UL (ref 4.6–6.2)
SODIUM SERPL-SCNC: 141 MMOL/L (ref 136–145)
TACROLIMUS BLD-MCNC: 5 NG/ML (ref 5–15)
WBC # BLD AUTO: 9.79 K/UL (ref 3.9–12.7)

## 2020-03-24 PROCEDURE — 85730 THROMBOPLASTIN TIME PARTIAL: CPT | Mod: HCNC

## 2020-03-24 PROCEDURE — 85610 PROTHROMBIN TIME: CPT | Mod: HCNC

## 2020-03-24 PROCEDURE — 36415 COLL VENOUS BLD VENIPUNCTURE: CPT | Mod: HCNC

## 2020-03-24 PROCEDURE — 25000003 PHARM REV CODE 250: Mod: HCNC | Performed by: STUDENT IN AN ORGANIZED HEALTH CARE EDUCATION/TRAINING PROGRAM

## 2020-03-24 PROCEDURE — 99232 PR SUBSEQUENT HOSPITAL CARE,LEVL II: ICD-10-PCS | Mod: HCNC,,, | Performed by: INTERNAL MEDICINE

## 2020-03-24 PROCEDURE — 99238 PR HOSPITAL DISCHARGE DAY,<30 MIN: ICD-10-PCS | Mod: HCNC,GC,, | Performed by: HOSPITALIST

## 2020-03-24 PROCEDURE — 80053 COMPREHEN METABOLIC PANEL: CPT | Mod: HCNC

## 2020-03-24 PROCEDURE — 99238 HOSP IP/OBS DSCHRG MGMT 30/<: CPT | Mod: HCNC,GC,, | Performed by: HOSPITALIST

## 2020-03-24 PROCEDURE — 25000242 PHARM REV CODE 250 ALT 637 W/ HCPCS: Mod: HCNC | Performed by: STUDENT IN AN ORGANIZED HEALTH CARE EDUCATION/TRAINING PROGRAM

## 2020-03-24 PROCEDURE — 85025 COMPLETE CBC W/AUTO DIFF WBC: CPT | Mod: HCNC

## 2020-03-24 PROCEDURE — 99232 SBSQ HOSP IP/OBS MODERATE 35: CPT | Mod: HCNC,,, | Performed by: INTERNAL MEDICINE

## 2020-03-24 PROCEDURE — 63600175 PHARM REV CODE 636 W HCPCS: Mod: HCNC | Performed by: STUDENT IN AN ORGANIZED HEALTH CARE EDUCATION/TRAINING PROGRAM

## 2020-03-24 PROCEDURE — 84100 ASSAY OF PHOSPHORUS: CPT | Mod: HCNC

## 2020-03-24 PROCEDURE — 83735 ASSAY OF MAGNESIUM: CPT | Mod: HCNC

## 2020-03-24 PROCEDURE — 80197 ASSAY OF TACROLIMUS: CPT | Mod: HCNC

## 2020-03-24 PROCEDURE — 80162 ASSAY OF DIGOXIN TOTAL: CPT | Mod: HCNC

## 2020-03-24 RX ORDER — WARFARIN 2.5 MG/1
2.5 TABLET ORAL ONCE
Status: DISCONTINUED | OUTPATIENT
Start: 2020-03-24 | End: 2020-03-24 | Stop reason: HOSPADM

## 2020-03-24 RX ORDER — WARFARIN SODIUM 5 MG/1
5 TABLET ORAL DAILY
Status: DISCONTINUED | OUTPATIENT
Start: 2020-03-25 | End: 2020-03-24 | Stop reason: HOSPADM

## 2020-03-24 RX ORDER — HYDRALAZINE HYDROCHLORIDE 50 MG/1
50 TABLET, FILM COATED ORAL EVERY 8 HOURS
Qty: 90 TABLET | Refills: 2 | Status: SHIPPED | OUTPATIENT
Start: 2020-03-24 | End: 2020-05-19 | Stop reason: SDUPTHER

## 2020-03-24 RX ORDER — KETOROLAC TROMETHAMINE 15 MG/ML
15 INJECTION, SOLUTION INTRAMUSCULAR; INTRAVENOUS ONCE AS NEEDED
Status: DISCONTINUED | OUTPATIENT
Start: 2020-03-24 | End: 2020-03-24 | Stop reason: HOSPADM

## 2020-03-24 RX ORDER — LIDOCAINE 50 MG/G
1 PATCH TOPICAL
Status: DISCONTINUED | OUTPATIENT
Start: 2020-03-24 | End: 2020-03-24 | Stop reason: HOSPADM

## 2020-03-24 RX ORDER — POTASSIUM CHLORIDE 20 MEQ/1
20 TABLET, EXTENDED RELEASE ORAL
Status: COMPLETED | OUTPATIENT
Start: 2020-03-24 | End: 2020-03-24

## 2020-03-24 RX ORDER — HYDRALAZINE HYDROCHLORIDE 50 MG/1
50 TABLET, FILM COATED ORAL EVERY 8 HOURS
Status: DISCONTINUED | OUTPATIENT
Start: 2020-03-24 | End: 2020-03-24 | Stop reason: HOSPADM

## 2020-03-24 RX ADMIN — ATORVASTATIN CALCIUM 10 MG: 10 TABLET, FILM COATED ORAL at 08:03

## 2020-03-24 RX ADMIN — POTASSIUM CHLORIDE 20 MEQ: 1500 TABLET, EXTENDED RELEASE ORAL at 08:03

## 2020-03-24 RX ADMIN — POTASSIUM CHLORIDE 20 MEQ: 1500 TABLET, EXTENDED RELEASE ORAL at 11:03

## 2020-03-24 RX ADMIN — GABAPENTIN 300 MG: 300 CAPSULE ORAL at 08:03

## 2020-03-24 RX ADMIN — NIFEDIPINE 90 MG: 30 TABLET, FILM COATED, EXTENDED RELEASE ORAL at 08:03

## 2020-03-24 RX ADMIN — ACETAMINOPHEN 650 MG: 325 TABLET ORAL at 10:03

## 2020-03-24 RX ADMIN — CARVEDILOL 25 MG: 25 TABLET, FILM COATED ORAL at 08:03

## 2020-03-24 RX ADMIN — HYDRALAZINE HYDROCHLORIDE 25 MG: 25 TABLET, FILM COATED ORAL at 05:03

## 2020-03-24 RX ADMIN — FLUTICASONE PROPIONATE 50 MCG: 50 SPRAY, METERED NASAL at 08:03

## 2020-03-24 RX ADMIN — DOXAZOSIN 4 MG: 2 TABLET ORAL at 08:03

## 2020-03-24 RX ADMIN — ASPIRIN 81 MG: 81 TABLET, COATED ORAL at 08:03

## 2020-03-24 RX ADMIN — HYDRALAZINE HYDROCHLORIDE 50 MG: 50 TABLET, FILM COATED ORAL at 01:03

## 2020-03-24 RX ADMIN — ACETAMINOPHEN 650 MG: 325 TABLET ORAL at 04:03

## 2020-03-24 RX ADMIN — CALCIUM 1000 MG: 500 TABLET ORAL at 08:03

## 2020-03-24 RX ADMIN — ALBUTEROL SULFATE 1 PUFF: 90 AEROSOL, METERED RESPIRATORY (INHALATION) at 08:03

## 2020-03-24 RX ADMIN — PREDNISONE 5 MG: 5 TABLET ORAL at 06:03

## 2020-03-24 RX ADMIN — TACROLIMUS 2 MG: 1 CAPSULE ORAL at 08:03

## 2020-03-24 RX ADMIN — LIDOCAINE 1 PATCH: 50 PATCH TOPICAL at 11:03

## 2020-03-24 RX ADMIN — POTASSIUM CHLORIDE 20 MEQ: 1500 TABLET, EXTENDED RELEASE ORAL at 10:03

## 2020-03-24 RX ADMIN — FAMOTIDINE 20 MG: 20 TABLET, FILM COATED ORAL at 08:03

## 2020-03-24 RX ADMIN — MYCOPHENOLATE MOFETIL 500 MG: 250 CAPSULE ORAL at 10:03

## 2020-03-24 NOTE — PLAN OF CARE
Patient remained free from falls and injury throughout shift. No acute events overnight. Patient alert and oriented x4; vital signs stable. Patient denies chest pain and shortness of breath. Safety measures addressed --bed locked and in low position with siderails up x2 and call light/personal items within reach. Plan of care reviewed with patient; all questions and concerns addressed. Patient verbalizes understanding. Will continue to monitor.

## 2020-03-24 NOTE — SUBJECTIVE & OBJECTIVE
Interval History: Pt seen and examined at bedside, reports ongoing CP today. Denies SOB, fevers, URI symptoms. Cr today stable at 1.9 and pt reports good UO. Prograf level today 5.     Subjective:     History of Present Illness:   63 yro M with PMH of ESRD 2/2 unclear etiology (started RRT~Jan 1992, [place] [facility] [place], resumed RRT 4/1996 until receiving DDRT #2 CMV D-/R+ 4/2005), atrial fibrillation (s/p DCCV in 4/2009 on warfarin), gout, CAD s/p stenting 2006, tobacco abuse, and pericarditis x3 episodes (last episode in Jan 2017) who presents to the ED 3/23/20 with the complaint of CP. The CP has been present since yesterday, is 10/10, and stabbing in nature and associated with SOB. Denies cough, fevers, chills, sick contacts, or recent travel. Pt reports CP is similar to past episodes of CP 2/2 pericarditis. In the ED, EKG and CXR without significant findings and Tn 0.05, trended down to 0.03. Pt received a dose of colchicine in ED for treatment of presumed pericarditis.    Additional KTM PMH:  He had transplant nephrectomy of DDRT #1 (possibly in 1997 vs 2005 around time of second transplant). He has CKD stage 2/3 - GFR 30-89 and his baseline creatinine is between 1.6 to 1.8. He takes mycophenolate mofetil, prednisone and tacrolimus for maintenance immunosuppression.     Past Medical and Surgical History: Mr. Phelps has a past medical history of (HFpEF) heart failure with preserved ejection fraction (9/25/2017), Atrial fibrillation, CAD (coronary artery disease), CKD (chronic kidney disease) stage 3, GFR 30-59 ml/min, Diverticulosis, Fever blister, Heart attack (2006), Hyperlipidemia, Hypertension, Immunodeficiency due to treatment with immunosuppressive medication, Keloid cicatrix, Metabolic bone disease, Pericarditis, S/P kidney transplant, Supraventricular tachycardia (06/2019), Thrombocytopenia, Thyroid disease, and Unspecified disorder of kidney and ureter.  He has a past  surgical history that includes Cholecystectomy; Cardioversion (04/30/15); Kidney transplant (2005); Parathyroidectomy; Colonoscopy (April 20, 2011); Coronary angioplasty with stent (9/13/2006); and Colonoscopy (N/A, 3/13/2020).    Past Social and Family History: Mr. Phelps reports that he has been smoking cigarettes. He has a 20.00 pack-year smoking history. He has never used smokeless tobacco. He reports that he does not drink alcohol or use drugs.His family history includes Heart disease in his father; Kidney disease in his brother and sister; Kidney failure in his brother and sister; No Known Problems in his brother, sister, sister, and sister; Thyroid disease in his mother.    Intake/Output - Last 3 Shifts       03/22 0700 - 03/23 0659 03/23 0700 - 03/24 0659 03/24 0700 - 03/25 0659    P.O.  960 240    Total Intake(mL/kg)  960 (9.4) 240 (2.4)    Urine (mL/kg/hr)  2000 (0.8) 400 (1.8)    Total Output  2000 400    Net  -1040 -160                  Review of Systems   Constitutional: Negative for chills and fever.   HENT: Negative for congestion and rhinorrhea.    Eyes: Negative for pain and visual disturbance.   Respiratory: Positive for shortness of breath. Negative for cough.    Cardiovascular: Positive for chest pain. Negative for palpitations and leg swelling.   Gastrointestinal: Negative for abdominal pain, constipation and diarrhea.   Endocrine: Negative for polydipsia and polyuria.   Genitourinary: Negative for dysuria and hematuria.   Musculoskeletal: Negative for arthralgias and myalgias.   Skin: Negative for rash and wound.   Neurological: Negative for dizziness, weakness and headaches.   Psychiatric/Behavioral: Negative for agitation and behavioral problems.     Objective:     Vital Signs (Most Recent):  Temp: 97.9 °F (36.6 °C) (03/24/20 0723)  Pulse: 70 (03/24/20 0844)  Resp: 19 (03/24/20 0844)  BP: (!) 175/79 (03/24/20 0723)  SpO2: 96 % (03/24/20 0844) Vital Signs (24h Range):  Temp:  [97.7 °F (36.5  "°C)-98.6 °F (37 °C)] 97.9 °F (36.6 °C)  Pulse:  [55-74] 70  Resp:  [16-22] 19  SpO2:  [95 %-99 %] 96 %  BP: (142-196)/(64-91) 175/79     Weight: 101.7 kg (224 lb 3.3 oz)  Height: 6' 1" (185.4 cm)  Body mass index is 29.58 kg/m².    Physical Exam   Constitutional: He is oriented to person, place, and time. He appears well-developed and well-nourished.   HENT:   Head: Normocephalic and atraumatic.   Eyes: Pupils are equal, round, and reactive to light. EOM are normal. No scleral icterus.   Neck: Neck supple. No thyromegaly present.   Cardiovascular: Normal rate, regular rhythm and normal heart sounds.   No murmur heard.  L chest wall TTP   Pulmonary/Chest: Effort normal and breath sounds normal. No respiratory distress. He has no wheezes. He has no rales.   Abdominal: Soft. Bowel sounds are normal. He exhibits no distension. There is no tenderness.   Musculoskeletal: He exhibits no edema or deformity.   Lymphadenopathy:     He has no cervical adenopathy.   Neurological: He is alert and oriented to person, place, and time.   Skin: Skin is warm and dry.       Significant Labs:  CBC:   Recent Labs   Lab 03/23/20  0338 03/24/20 0313   WBC 8.68 9.79   RBC 4.76 4.26*   HGB 12.0* 10.5*   HCT 39.8* 35.4*   * 119*   MCV 84 83   MCH 25.2* 24.6*   MCHC 30.2* 29.7*     CMP:   Recent Labs   Lab 03/23/20  0338 03/24/20  0313    92   CALCIUM 7.9* 7.4*   ALBUMIN 3.5 3.0*   PROT 6.6 6.0    141   K 3.6 3.5   CO2 24 25    104   BUN 25* 25*   CREATININE 1.9* 1.9*   ALKPHOS 68 53*   ALT 21 16   AST 23 17       Diagnostics:  Imaging Results          X-Ray Chest AP Portable (Final result)  Result time 03/23/20 04:00:58    Final result by Sd Altamirano MD (03/23/20 04:00:58)                 Impression:      No acute cardiopulmonary finding identified on this single view.      Electronically signed by: Sd Altamirano MD  Date:    03/23/2020  Time:    04:00             Narrative:    EXAMINATION:  XR CHEST AP " "PORTABLE    CLINICAL HISTORY:  Provided history is "Chest Pain;  ".    TECHNIQUE:  One view of the chest.    COMPARISON:  CT chest, 02/11/2020.  Chest radiograph, 06/16/2019 and 09/24/2017.    FINDINGS:  Cardiac wires overlie the chest.  Lung volumes are relatively low.  Bilateral costophrenic angles are partially excluded from the field of view.  Scattered calcified granulomata are present.  There is no focal consolidation.  No sizable pleural effusion.  No pneumothorax.                                "

## 2020-03-24 NOTE — PROGRESS NOTES
[facility]  Kidney Transplant  Progress Note      Reason for Follow-up: Reassessment of Kidney Transplant - 4/12/2005  (#2) recipient and management of immunosuppression.    ORGAN: LEFT KIDNEY      Interval History: Pt seen and examined at bedside, reports ongoing CP today. Denies SOB, fevers, URI symptoms. Cr today stable at 1.9 and pt reports good UO. Prograf level today 5.     Subjective:     History of Present Illness:   63 yro M with PMH of ESRD 2/2 unclear etiology (started RRT~Jan 1992, received DDRT 6/1992 at [facility] which failed 2/2 rejection, resumed RRT 4/1996 until receiving DDRT #2 CMV D-/R+ 4/2005), atrial fibrillation (s/p DCCV in 4/2009 on warfarin), gout, CAD s/p stenting 2006, tobacco abuse, and pericarditis x3 episodes (last episode in Jan 2017) who presents to the ED 3/23/20 with the complaint of CP. The CP has been present since yesterday, is 10/10, and stabbing in nature and associated with SOB. Denies cough, fevers, chills, sick contacts, or recent travel. Pt reports CP is similar to past episodes of CP 2/2 pericarditis. In the ED, EKG and CXR without significant findings and Tn 0.05, trended down to 0.03. Pt received a dose of colchicine in ED for treatment of presumed pericarditis.    Additional KTM PMH:  He had transplant nephrectomy of DDRT #1 (possibly in 1997 vs 2005 around time of second transplant). He has CKD stage 2/3 - GFR 30-89 and his baseline creatinine is between 1.6 to 1.8. He takes mycophenolate mofetil, prednisone and tacrolimus for maintenance immunosuppression.     Past Medical and Surgical History: Mr. Phelps has a past medical history of (HFpEF) heart failure with preserved ejection fraction (9/25/2017), Atrial fibrillation, CAD (coronary artery disease), CKD (chronic kidney disease) stage 3, GFR 30-59 ml/min, Diverticulosis, Fever blister, Heart attack (2006), Hyperlipidemia, Hypertension, Immunodeficiency due to treatment with immunosuppressive  medication, Keloid cicatrix, Metabolic bone disease, Pericarditis, S/P kidney transplant, Supraventricular tachycardia (06/2019), Thrombocytopenia, Thyroid disease, and Unspecified disorder of kidney and ureter.  He has a past surgical history that includes Cholecystectomy; Cardioversion (04/30/15); Kidney transplant (2005); Parathyroidectomy; Colonoscopy (April 20, 2011); Coronary angioplasty with stent (9/13/2006); and Colonoscopy (N/A, 3/13/2020).    Past Social and Family History: Mr. Phelps reports that he has been smoking cigarettes. He has a 20.00 pack-year smoking history. He has never used smokeless tobacco. He reports that he does not drink alcohol or use drugs.His family history includes Heart disease in his father; Kidney disease in his brother and sister; Kidney failure in his brother and sister; No Known Problems in his brother, sister, sister, and sister; Thyroid disease in his mother.    Intake/Output - Last 3 Shifts       03/22 0700 - 03/23 0659 03/23 0700 - 03/24 0659 03/24 0700 - 03/25 0659    P.O.  960 240    Total Intake(mL/kg)  960 (9.4) 240 (2.4)    Urine (mL/kg/hr)  2000 (0.8) 400 (1.8)    Total Output  2000 400    Net  -1040 -160                  Review of Systems   Constitutional: Negative for chills and fever.   HENT: Negative for congestion and rhinorrhea.    Eyes: Negative for pain and visual disturbance.   Respiratory: Positive for shortness of breath. Negative for cough.    Cardiovascular: Positive for chest pain. Negative for palpitations and leg swelling.   Gastrointestinal: Negative for abdominal pain, constipation and diarrhea.   Endocrine: Negative for polydipsia and polyuria.   Genitourinary: Negative for dysuria and hematuria.   Musculoskeletal: Negative for arthralgias and myalgias.   Skin: Negative for rash and wound.   Neurological: Negative for dizziness, weakness and headaches.   Psychiatric/Behavioral: Negative for agitation and behavioral problems.     Objective:     Vital  "Signs (Most Recent):  Temp: 97.9 °F (36.6 °C) (03/24/20 0723)  Pulse: 70 (03/24/20 0844)  Resp: 19 (03/24/20 0844)  BP: (!) 175/79 (03/24/20 0723)  SpO2: 96 % (03/24/20 0844) Vital Signs (24h Range):  Temp:  [97.7 °F (36.5 °C)-98.6 °F (37 °C)] 97.9 °F (36.6 °C)  Pulse:  [55-74] 70  Resp:  [16-22] 19  SpO2:  [95 %-99 %] 96 %  BP: (142-196)/(64-91) 175/79     Weight: 101.7 kg (224 lb 3.3 oz)  Height: 6' 1" (185.4 cm)  Body mass index is 29.58 kg/m².    Physical Exam   Constitutional: He is oriented to person, place, and time. He appears well-developed and well-nourished.   HENT:   Head: Normocephalic and atraumatic.   Eyes: Pupils are equal, round, and reactive to light. EOM are normal. No scleral icterus.   Neck: Neck supple. No thyromegaly present.   Cardiovascular: Normal rate, regular rhythm and normal heart sounds.   No murmur heard.  L chest wall TTP   Pulmonary/Chest: Effort normal and breath sounds normal. No respiratory distress. He has no wheezes. He has no rales.   Abdominal: Soft. Bowel sounds are normal. He exhibits no distension. There is no tenderness.   Musculoskeletal: He exhibits no edema or deformity.   Lymphadenopathy:     He has no cervical adenopathy.   Neurological: He is alert and oriented to person, place, and time.   Skin: Skin is warm and dry.       Significant Labs:  CBC:   Recent Labs   Lab 03/23/20 0338 03/24/20 0313   WBC 8.68 9.79   RBC 4.76 4.26*   HGB 12.0* 10.5*   HCT 39.8* 35.4*   * 119*   MCV 84 83   MCH 25.2* 24.6*   MCHC 30.2* 29.7*     CMP:   Recent Labs   Lab 03/23/20 0338 03/24/20 0313    92   CALCIUM 7.9* 7.4*   ALBUMIN 3.5 3.0*   PROT 6.6 6.0    141   K 3.6 3.5   CO2 24 25    104   BUN 25* 25*   CREATININE 1.9* 1.9*   ALKPHOS 68 53*   ALT 21 16   AST 23 17       Diagnostics:  Imaging Results          X-Ray Chest AP Portable (Final result)  Result time 03/23/20 04:00:58    Final result by Sd Altamirano MD (03/23/20 04:00:58)                 " "Impression:      No acute cardiopulmonary finding identified on this single view.      Electronically signed by: Sd Altamirano MD  Date:    03/23/2020  Time:    04:00             Narrative:    EXAMINATION:  XR CHEST AP PORTABLE    CLINICAL HISTORY:  Provided history is "Chest Pain;  ".    TECHNIQUE:  One view of the chest.    COMPARISON:  CT chest, 02/11/2020.  Chest radiograph, 06/16/2019 and 09/24/2017.    FINDINGS:  Cardiac wires overlie the chest.  Lung volumes are relatively low.  Bilateral costophrenic angles are partially excluded from the field of view.  Scattered calcified granulomata are present.  There is no focal consolidation.  No sizable pleural effusion.  No pneumothorax.                                  Assessment/Plan:     * Chest pain  -ddx includes chostochondritis vs pericarditis  -plan per primary team      Immunosuppressive management encounter following kidney transplant  -continue home  BID  -continue home prograf 2/2  -continue home prednisone 5mg   -daily prograf levels  -will monitor for toxicities associated with immunosuppressive              H/O kidney transplant  63 yro M s/p kidney transplant 2015 (ESRD 2/2 unclear etiology; started RRT~Jan 1992, [place] 6/1992 [place] [facility] [place] which failed 2/2 rejection, resumed RRT 4/1996 until receiving DDRT #2 CMV D-/R+ 4/2005), atrial fibrillation, CAD s/p stenting 2006, and pericarditis (last episode in Jan 2017) who presents to the ED 3/23/20 with the complaint of CP similar in quality to past episodes of pericarditis, received colchicine in ED. CP ddx includes costochondritis, pericarditis.    -baseline Cr 1.6 - 2.0  -Cr on admission 1.9, today stable at 1.9  -continue home  BID  -continue home prograf 2/2  -continue home prednisone 5mg   -daily prograf levels  -will monitor for toxicities associated with immunosuppressive agents  -Trend RFPs, chart IOs, renally dose medications, avoid nephrotoxic agents  -will continue to follow " patient  -Please see attending attestation from Dr Sampson to follow             Adrianna Cummings MD  Kidney Transplant  [facility]

## 2020-03-24 NOTE — NURSING
Discharged to home. Instructions re; diet and medication changes given. Instructed to f/u as scheduled. Sitting at bedside awaiting spouse to get off work no distress noted.

## 2020-03-24 NOTE — NURSING
IM3 paged. To confirm that its  Ok for pt to confirm that pt may take Tylenol for pain. Explained to MD  That AVS stated that pt should stop taking Tylenol. Pt informed that Tylenol can be taken q 6 hours prn. Verbalized understanding.

## 2020-03-24 NOTE — PLAN OF CARE
03/24/20 1033   Discharge Assessment   Assessment Type Discharge Planning Assessment   Confirmed/corrected address and phone number on facesheet? Yes   Assessment information obtained from? Patient   Expected Length of Stay (days) 2   Communicated expected length of stay with patient/caregiver yes   Prior to hospitilization cognitive status: Alert/Oriented   Prior to hospitalization functional status: Independent   Current cognitive status: Alert/Oriented   Current Functional Status: Independent   Lives With spouse   Able to Return to Prior Arrangements yes   Is patient able to care for self after discharge? Yes   Who are your caregiver(s) and their phone number(s)? Shea Phelps, spouse  [phone]   Patient's perception of discharge disposition admitted as an inpatient   Readmission Within the Last 30 Days no previous admission in last 30 days   Patient currently being followed by outpatient case management? No   Patient currently receives any other outside agency services? No   Equipment Currently Used at Home none   Do you have any problems affording any of your prescribed medications? TBD   Is the patient taking medications as prescribed? yes   Does the patient have transportation home? Yes   Transportation Anticipated family or friend will provide   Does the patient receive services at the [facility]? No   Discharge Plan A Home   Discharge Plan B Home;Home Health   DME Needed Upon Discharge  none   Patient/Family in Agreement with Plan yes   Admitted with CP, HTN. Lives with wife and is independent in his ADLs. Plan is to DC home. No DC needs identified.

## 2020-03-24 NOTE — ASSESSMENT & PLAN NOTE
63 yro M s/p kidney transplant 2015 (ESRD 2/2 unclear etiology; started RRT~Jan 1992, [place] 6/[place] [facility] [place] which failed 2/2 rejection, resumed RRT 4/1996 until receiving DDRT #2 CMV D-/R+ 4/2005), atrial fibrillation, CAD s/p stenting 2006, and pericarditis (last episode in Jan 2017) who presents to the ED 3/23/20 with the complaint of CP similar in quality to past episodes of pericarditis, received colchicine in ED. CP ddx includes costochondritis, pericarditis.    -baseline Cr 1.6 - 2.0  -Cr on admission 1.9, today stable at 1.9  -continue home  BID  -continue home prograf 2/2  -continue home prednisone 5mg   -daily prograf levels  -will monitor for toxicities associated with immunosuppressive agents  -Trend RFPs, chart IOs, renally dose medications, avoid nephrotoxic agents  -will continue to follow patient  -Please see attending attestation from Dr Sampson to follow

## 2020-03-24 NOTE — DISCHARGE SUMMARY
[facility] Hospital Medicine  Discharge Summary      Patient Name: Ibrahima Phelps  MRN: [id number]  Admission Date: 3/23/2020  Hospital Length of Stay: 1 days  Discharge Date and Time:  03/24/2020 12:53 PM  Attending Physician: Cielo Hernandez MD   Discharging Provider: Cherelle Cho DO  Primary Care Provider: Connie Magdaleno MD  [facility] Medicine Team: [facility] HOSP MED 3 Cherelle Cho DO    HPI:   The pt is a 65 yo AA M with CAD (s/p stent 2005), paroxysmal atrial fibrillation (on coumadin 5 mg QD except M,Th 2.5 mg), history of pericarditis (2 prior episodes, remote, unrelated to MI), HTN, HLD, CKD III (s/p renal transplant in 2005, on Prograf and Cellcept), MI, and SVT (06/2019) that presents for evaluation of acute chest pain. The patient reports a sternal chest pain which he qualifies as sharp and stabbing in quality- rated 10/10 at onset with radiation to the back between his shoulder blades. Reports chest pain began on 3/22 around noon and was unrelenting throughout the day, unrelated to activity or position, but worse with deep inspiration. Reports associated SOB, no history of lung disease or home oxygen use. He was able to go to bed, but the pain woke him up- which prompted his ED visit. He states it feels similar to prior episode of pericarditis. Denies recent fevers, chills, sick contacts, or GI upset. Reports he has been taking all his home medications as prescribed, no missed doses. Denies history of heart failure, though reports swelling in his legs is greater than usual. Denies any unilateral leg pain, swelling, or skin changes to LE's. He denies any trauma. Recently washed car, unsure if he strained muscles doing so. Has had chickenpox, no shingles vaccination or episodes. Denies any skin changes over chest.     In the ED, he had EKG's with NSR with PAC's, no STEMI, though unclear ST depressions in leads V5 and V6 on 1/2 EKG's. Troponin at 0.053, repeat down to 0.031. Hgb 12, BUN  25, Cr 1.9 (BL unclear- 1.5-2), INR 1.6 and . CRP and ESR wnl. CXR with no acute cardiopulmonary findings, no consolidation, PTX or pleural effusion. Bedside echo performed with showed small trace pericardial effusion- negative White's sign and normal LV output.     * No surgery found *      Hospital Course:   Patient admitted to hospital medicine on 3/23 for chest pain most likely 2/2 HTN emergency vs costochondritis (reproducible)- repeat EKG stable and formal echo demonstrating concentric left ventricular remodeling, EF of 60%, normal right ventricular systolic function, normal LV diastolic function, aortic sclerosis without significant stenosis, and Intermediate central venous pressure (8 mmHg). Patient was diuresed with 40 mg IV Lasix- with good urine output. Re demonstration of chest pain at 1700 on 3/23- negative troponin and repeat EKG wnl. Patient clinically dry on exam on 3/24. Patient with chest pain intermittently throughout the night between 3/23-3/24- continues to be reproducible- well controlled with tylenol and morphine for severe pain. Transitioned to topical Lidoderm patch and discontinued morphine.  KTM consulted in setting of kidney transplant and elevated Cr. UA wnl. KTM agrees that kidney function is at baseline for this patient. Patient's home immunosuppressive prograf, Cellcept and prednisone resumed. HTN not well controlled with home medications. Hydralazine initiated on 3/23 at 25 mg TID. Titrated up to 50 mg TID with better control.     Patient medically stable and ready to DC home. Plan for hospital follow up with PCP and for BP check.  Follow up with coumadin clinic.       Review of Systems   Constitutional: Negative for chills and fever.   HENT: Negative for congestion and rhinorrhea.    Respiratory: Negative for cough and wheezing.    Cardiovascular: Positive for chest pain. Negative for palpitations.   Gastrointestinal: Negative for abdominal distention, abdominal pain and  diarrhea.   Genitourinary: Negative for dysuria, frequency and urgency.   Musculoskeletal: Negative for arthralgias.   Skin: Negative for pallor and rash.   Neurological: Negative for dizziness, weakness and light-headedness.   Psychiatric/Behavioral: Negative for agitation and confusion.      Objective:      Vitals:    03/24/20 1212   BP: (!) 150/74   Pulse: 61   Resp: 19   Temp: 98.2 °F (36.8 °C)        Physical Exam   Constitutional: He is oriented to person, place, and time. He appears well-developed and well-nourished. No distress.   HENT:   Head: Normocephalic and atraumatic.   Right Ear: External ear normal.   Left Ear: External ear normal.   Nose: Nose normal.   Eyes: Pupils are equal, round, and reactive to light. EOM are normal. Right eye exhibits no discharge. Left eye exhibits no discharge. No scleral icterus.   Neck: Normal range of motion. Neck supple. No tracheal deviation present.   Cardiovascular: Normal rate, regular rhythm, normal heart sounds and intact distal pulses. Exam reveals no friction rub.   Pulses:       Radial pulses are 2+ on the right side, and 2+ on the left side.   Reproducible chest pain along anterior right costal margin- radiating to left costal margin as well      Pulmonary/Chest: Effort normal. No stridor. No respiratory distress. No rales.  On RA saturating well   Abdominal: Soft. Bowel sounds are normal. He exhibits no distension and no mass. There is no tenderness. There is no guarding.   No pulsatile abdominal mass    Musculoskeletal: Normal range of motion. He exhibits no edema.   Neurological: He is alert and oriented to person, place, and time.   Skin: Skin is warm and dry. No rash noted. He is not diaphoretic. No erythema. No pallor.   No clubbing noted to digits    Psychiatric: He has a normal mood and affect. His behavior is normal.   Nursing note and vitals reviewed.      Consults:   Consults (From admission, onward)        Status Ordering Provider     Inpatient  consult to Kidney/Pancreas Transplant Medicine  Once     Provider:  (Not yet assigned)    Completed BON QUINTERO          No new Assessment & Plan notes have been filed under this hospital service since the last note was generated.  Service: Hospital Medicine    Final Active Diagnoses:    Diagnosis Date Noted POA    PRINCIPAL PROBLEM:  Chest pain [R07.9] 03/23/2020 Yes    Hypertensive emergency [I16.1] 03/23/2020 Unknown    Subtherapeutic international normalized ratio (INR) [R79.1] 03/23/2020 Unknown    Immunosuppressive management encounter following kidney transplant [Z79.899, Z94.0] 03/23/2020 Not Applicable    Acute pericarditis [I30.9]  Yes    Atrial fibrillation [I48.91] 09/25/2017 Yes    H/O kidney transplant [Z94.0] 07/10/2015 Not Applicable    Long term (current) use of anticoagulants [V58.61] [Z79.01] 05/04/2015 Not Applicable    Tobacco use [Z72.0] 09/05/2014 Yes    Acute on CKD (chronic kidney disease) stage 3, GFR 30-59 ml/min [N18.3] 01/17/2014 Yes    CAD (coronary artery disease) [I25.10]  Yes    Mixed hyperlipidemia [E78.2]  Yes      Problems Resolved During this Admission:       Discharged Condition: stable    Disposition: Home or Self Care    Follow Up:    Patient Instructions:   No discharge procedures on file.    Significant Diagnostic Studies: Labs: All labs within the past 24 hours have been reviewed    Pending Diagnostic Studies:     None         Medications:  Reconciled Home Medications:      Medication List      START taking these medications    hydrALAZINE 50 MG tablet  Commonly known as:  APRESOLINE  Take 1 tablet (50 mg total) by mouth every 8 (eight) hours.        CONTINUE taking these medications    ADULT ASPIRIN EC LOW STRENGTH 81 MG EC tablet  Generic drug:  aspirin  Take 1 tablet by mouth Daily.     albuterol 1.25 mg/3 mL Nebu  Commonly known as:  ACCUNEB  Take 3 mLs (1.25 mg total) by nebulization every 6 (six) hours as needed. Rescue     ALLEGRA 60 MG  tablet  Generic drug:  fexofenadine  Take 1 tablet by mouth Twice daily as needed.     atorvastatin 10 MG tablet  Commonly known as:  LIPITOR  Take 1 tablet (10 mg total) by mouth once daily.     calcium carbonate 500 mg calcium (1,250 mg) tablet  Commonly known as:  OS-TRISH  Two by mouth twice a day     carvediloL 25 MG tablet  Commonly known as:  COREG  Take 1 tablet (25 mg total) by mouth 2 (two) times daily with meals.     colchicine 0.6 mg tablet  Commonly known as:  COLCRYS  One po BID up to three days prn gout flare     doxazosin 4 MG tablet  Commonly known as:  CARDURA  Take 1 tablet (4 mg total) by mouth every 12 (twelve) hours.     fluticasone propionate 50 mcg/actuation nasal spray  Commonly known as:  FLONASE  1 spray (50 mcg total) by Each Nostril route once daily.     furosemide 40 MG tablet  Commonly known as:  LASIX  Take 0.5 tablets (20 mg total) by mouth 2 (two) times daily.     gabapentin 300 MG capsule  Commonly known as:  NEURONTIN  Take 1 capsule (300 mg total) by mouth 2 (two) times daily.     multivitamin per tablet  Commonly known as:  THERAGRAN  Take 1 tablet by mouth Daily.     mycophenolate 250 mg Cap  Commonly known as:  CELLCEPT  Take 2 capsules (500 mg total) by mouth 2 (two) times daily.     nicotine 21 mg/24 hr  Commonly known as:  NICODERM CQ  Place 1 patch onto the skin once daily.     NIFEdipine 90 MG (OSM) 24 hr tablet  Commonly known as:  PROCARDIA-XL  Take 1 tablet (90 mg total) by mouth once daily.     paricalcitoL 1 MCG capsule  Commonly known as:  ZEMPLAR  One po MWF     potassium chloride 10 MEQ Tbsr  Commonly known as:  KLOR-CON  Take 1 tablet (10 mEq total) by mouth once daily.     predniSONE 5 MG tablet  Commonly known as:  DELTASONE  Take 1 tablet (5 mg total) by mouth every morning.     ranitidine 150 MG tablet  Commonly known as:  ZANTAC  Take 1 tablet (150 mg total) by mouth 2 (two) times daily.     spironolactone 25 MG tablet  Commonly known as:  ALDACTONE  Take 1  tablet (25 mg total) by mouth once daily.     tacrolimus 1 MG Cap  Commonly known as:  PROGRAF  Take 2 capsules (2 mg total) by mouth every 12 (twelve) hours. TAKE 2 CAPSULES BY MOUTH EVERY MORNING AND 2 CAPSULES EVERY EVENING.  Z94.0 kidney transplant     triamcinolone acetonide 0.1% 0.1 % cream  Commonly known as:  KENALOG  AAA bid to rash on ankles     vitamin D 1000 units Tab  Commonly known as:  VITAMIN D3  Take 370 mg by mouth 2 (two) times daily. 2 tablets BID     warfarin 5 MG tablet  Commonly known as:  COUMADIN  Take 0.5-1 pill by mouth daily Or as directed by Coumadin Clinic; #75 pills = 3-month supply        STOP taking these medications    acetaminophen 325 MG tablet  Commonly known as:  TYLENOL            Indwelling Lines/Drains at time of discharge:   Lines/Drains/Airways     None                 Time spent on the discharge of patient: 35 minutes  Patient was seen and examined on the date of discharge and determined to be suitable for discharge.         Cherelle Cho DO  Department of Hospital Medicine  [facility]

## 2020-03-24 NOTE — PROGRESS NOTES
03/24/20 0528   Vital Signs   BP (!) 196/91       Hospital medicine 3 paged to notify team of patient BP. In addition to high BP, patient reports headache 7/10. Spoke with Dr. oGrdon--will administer 0600 hydralazine now and reassess.     0615: Reassessed patient 30 minutes after administering 25mg hydralazine, /77 (111).

## 2020-03-25 NOTE — PLAN OF CARE
03/25/20 0640   Final Note   Assessment Type Final Discharge Note   Anticipated Discharge Disposition Home   Discharge planning education complete? Yes

## 2020-03-27 ENCOUNTER — PATIENT OUTREACH (OUTPATIENT)
Dept: ADMINISTRATIVE | Facility: CLINIC | Age: 65
End: 2020-03-27

## 2020-03-27 ENCOUNTER — TELEPHONE (OUTPATIENT)
Dept: INTERNAL MEDICINE | Facility: CLINIC | Age: 65
End: 2020-03-27

## 2020-03-27 NOTE — PATIENT INSTRUCTIONS
Uncertain Causes of Chest Pain    Chest pain can happen for a number of reasons. Sometimes the cause can't be determined. If your condition does not seem serious, and your pain does not appear to be coming from your heart, your healthcare provider may recommend watching it closely. Sometimes the signs of a serious problem take more time to appear. Many problems not related to your heart can cause chest pain.These include:  · Musculoskeletal. Costochondritis, an inflammation of the tissues around the ribs that can occur from trauma or overuse injuries  · Respiratory. Pneumonia, pneumothorax, or pneumonitis (inflammation of the lining of the chest and lungs)  · Gastrointestinal. Esophageal reflux, heartburn, or gallbladder disease  · Anxiety and panic disorders  · Nerve compression and neuritis  · Miscellaneous problems such as aortic aneurysm or pulmonary embolism (a blood clot in the lungs)  Home care  After your visit, follow these recommendations:  · Rest today and avoid strenuous activity.  · Take any prescribed medicine as directed.  · Be aware of any recurrent chest pain and notice any changes  Follow-up care  Follow up with your healthcare provider if you do not start to feel better within 24 hours, or as advised.  Call 911  Call 911 if any of these occur:  · A change in the type of pain: if it feels different, becomes more severe, lasts longer, or begins to spread into your shoulder, arm, neck, jaw or back  · Shortness of breath or increased pain with breathing  · Weakness, dizziness, or fainting  · Rapid heart beat  · Crushing sensation in your chest  When to seek medical advice  Call your healthcare provider right away if any of the following occur:  · Cough with dark colored sputum (phlegm) or blood  · Fever of 100.4ºF (38ºC) or higher, or as directed by your healthcare provider  · Swelling, pain or redness in one leg  · Shortness of breath  Date Last Reviewed: 12/30/2015  © 8642-3343 [facility]  [email or web address]. [address] [place] [address], [place] [address]. All rights reserved. This information is not intended as a substitute for professional medical care. Always follow your healthcare professional's instructions.

## 2020-04-01 ENCOUNTER — OFFICE VISIT (OUTPATIENT)
Dept: URGENT CARE | Facility: CLINIC | Age: 65
End: 2020-04-01
Payer: MEDICARE

## 2020-04-01 VITALS
DIASTOLIC BLOOD PRESSURE: 60 MMHG | WEIGHT: 237 LBS | HEIGHT: 73 IN | OXYGEN SATURATION: 97 % | HEART RATE: 72 BPM | TEMPERATURE: 99 F | SYSTOLIC BLOOD PRESSURE: 126 MMHG | BODY MASS INDEX: 31.41 KG/M2

## 2020-04-01 DIAGNOSIS — M54.2 NECK PAIN: Primary | ICD-10-CM

## 2020-04-01 PROCEDURE — 99214 PR OFFICE/OUTPT VISIT, EST, LEVL IV, 30-39 MIN: ICD-10-PCS | Mod: S$GLB,,, | Performed by: NURSE PRACTITIONER

## 2020-04-01 PROCEDURE — 72040 XR CERVICAL SPINE 2 OR 3 VIEWS: ICD-10-PCS | Mod: FY,S$GLB,, | Performed by: RADIOLOGY

## 2020-04-01 PROCEDURE — 72040 X-RAY EXAM NECK SPINE 2-3 VW: CPT | Mod: FY,S$GLB,, | Performed by: RADIOLOGY

## 2020-04-01 PROCEDURE — 99214 OFFICE O/P EST MOD 30 MIN: CPT | Mod: S$GLB,,, | Performed by: NURSE PRACTITIONER

## 2020-04-01 RX ORDER — METHYLPREDNISOLONE 4 MG/1
TABLET ORAL
Qty: 1 PACKAGE | Refills: 0 | Status: SHIPPED | OUTPATIENT
Start: 2020-04-01 | End: 2020-04-22

## 2020-04-01 NOTE — PROGRESS NOTES
"Subjective:       Patient ID: Ibrahima Phelps is a 64 y.o. male.    Vitals:  height is 6' 1.2" (1.859 m) and weight is 107.5 kg (237 lb). His temperature is 98.9 °F (37.2 °C). His blood pressure is 126/60 and his pulse is 72. His oxygen saturation is 97%.     Chief Complaint: Neck Pain    Neck Pain    This is a new problem. The current episode started yesterday (last night). The problem occurs constantly. The problem has been gradually improving. The pain is associated with nothing. The pain is present in the left side and right side. The quality of the pain is described as aching. The pain is at a severity of 5/10. The pain is mild. Exacerbated by: breathing. The pain is worse during the night. Stiffness is present at night. Pertinent negatives include no chest pain, fever, headaches, leg pain, numbness, pain with swallowing, paresis, photophobia, syncope, tingling, trouble swallowing, visual change, weakness or weight loss. He has tried heat (morphine) for the symptoms. The treatment provided mild relief.       Constitution: Negative for chills, fatigue and fever.   HENT: Negative for congestion, sore throat and trouble swallowing.    Neck: Positive for neck pain. Negative for painful lymph nodes.   Cardiovascular: Negative for chest pain, leg swelling and passing out.   Eyes: Negative for photophobia, double vision and blurred vision.   Respiratory: Negative for cough and shortness of breath.    Gastrointestinal: Negative for nausea, vomiting and diarrhea.   Genitourinary: Negative for dysuria, frequency and urgency.   Musculoskeletal: Negative for joint pain, joint swelling, muscle cramps and muscle ache.   Skin: Negative for color change, pale and rash.   Allergic/Immunologic: Negative for seasonal allergies.   Neurological: Negative for dizziness, history of vertigo, light-headedness, passing out, headaches and numbness.   Hematologic/Lymphatic: Negative for swollen lymph nodes, easy bruising/bleeding and " "history of blood clots. Does not bruise/bleed easily.   Psychiatric/Behavioral: Negative for nervous/anxious, sleep disturbance and depression. The patient is not nervous/anxious.        Objective:      Physical Exam   Constitutional: He is oriented to person, place, and time. Vital signs are normal. He appears well-developed and well-nourished. He is active and cooperative. No distress.   HENT:   Head: Normocephalic and atraumatic.   Nose: Nose normal.   Mouth/Throat: Oropharynx is clear and moist and mucous membranes are normal.   Eyes: Conjunctivae and lids are normal.   Neck: Trachea normal, normal range of motion, full passive range of motion without pain and phonation normal. Neck supple.   Cardiovascular: Normal rate, regular rhythm, normal heart sounds, intact distal pulses and normal pulses.   Pulmonary/Chest: Effort normal and breath sounds normal. No accessory muscle usage or stridor. No apnea, no tachypnea and no bradypnea. No respiratory distress.   Abdominal: Soft. Normal appearance and bowel sounds are normal. He exhibits no abdominal bruit, no pulsatile midline mass and no mass.   Musculoskeletal: Normal range of motion. He exhibits tenderness. He exhibits no edema or deformity.        Cervical back: He exhibits tenderness and pain. He exhibits normal range of motion, no swelling, no edema, no deformity, no laceration, no spasm and normal pulse.   Neurological: He is alert and oriented to person, place, and time. He has normal strength and normal reflexes. No sensory deficit.   Skin: Skin is warm, dry, intact and not diaphoretic.   Psychiatric: He has a normal mood and affect. His speech is normal and behavior is normal. Judgment and thought content normal. Cognition and memory are normal.   Nursing note and vitals reviewed.      X-ray Chest Ap Portable    Result Date: 3/23/2020  EXAMINATION: XR CHEST AP PORTABLE CLINICAL HISTORY: Provided history is "Chest Pain;  ". TECHNIQUE: One view of the chest. " COMPARISON: CT chest, 02/11/2020.  Chest radiograph, 06/16/2019 and 09/24/2017. FINDINGS: Cardiac wires overlie the chest.  Lung volumes are relatively low.  Bilateral costophrenic angles are partially excluded from the field of view.  Scattered calcified granulomata are present.  There is no focal consolidation.  No sizable pleural effusion.  No pneumothorax.     No acute cardiopulmonary finding identified on this single view. Electronically signed by: Sd Altamirano MD Date:    03/23/2020 Time:    04:00    X-ray Cervical Spine 2 Or 3 Views    Result Date: 4/1/2020  EXAMINATION: XR CERVICAL SPINE 2 OR 3 VIEWS CLINICAL HISTORY: Cervicalgia TECHNIQUE: AP, lateral and open mouth views of the cervical spine were performed. COMPARISON: None. FINDINGS: Three views. C7 is partially obscured by soft tissues on lateral view.  Otherwise, lateral imaging demonstrates adequate alignment of the cervical spine without significant vertebral body height loss.  There is multilevel disc space height loss primarily involving C3-C4 and C4-C5..  There is prominent anterior marginal osteophytosis arising from C3, C4, and C5 with marked calcific density anterolateral to the C4 vertebral body.  The facets are aligned.  There is multilevel facet arthropathy.  AP spinal alignment is grossly unremarkable noting patient's head is tilted to the right.  The odontoid is intact.  The lateral masses of C1 are in anatomic relationship with C2.  The visualized upper lung zones are grossly clear noting there may be calcified granulomas projected over the left lung apex.  The paraspinous and hypopharyngeal soft tissues are unremarkable.  The airway is patent.     1. No acute displaced fracture or dislocation of the cervical spine noting prominent degenerative changes as described above. Electronically signed by: Noe Hammond MD Date:    04/01/2020 Time:    12:54  Assessment:       1. Neck pain        Plan:     Reviewed and discussed XR, discussed  plan of care with .     Neck pain  -     X-Ray Cervical Spine 2 or 3 Views; Future; Expected date: 04/01/2020  -     methylPREDNISolone (MEDROL DOSEPACK) 4 mg tablet; use as directed  Dispense: 1 Package; Refill: 0  -     Ambulatory referral/consult to Neurosurgery          Patient Instructions                                Orthopedic Follow up Discharge Instructions    If your condition worsens or fails to improve we recommend that you receive another evaluation at the ER immediately or contact your PCP to discuss your concerns or return here. You must understand that you've received an urgent care treatment only and that you may be released before all your medical problems are known or treated. You the patient will arrange for follouwp care as instructed.   If you were prescribed a narcotic or muscle relaxant do not drive or operate heavy machinery while taking these medications   Tylenol or ibuprofen can also be used as directed for pain unless you have an allergy to them or medical condition such as stomach ulcers, kidney or liver disease or blood thinners etc for which you should not be taking these type of medications.   If you were given a prescription NSAID here do not also take any over the counter NSAID like ibuprofen, aleve, advil, motrin etc   RICE which means rest, ice compression and elevation are helpful.   If you have Low Back Pain and develop bowel or bladder symptoms or increase pain going down your legs go to the ER immediately.   If you were given a splint wear it at all times.   If you were given crutches use them as we instructed. Do not rest your armpits on the foam pad or you risk compressing the nerves and the vessels there   Follow up with the orthopedist in 1 week if you continue with pain.   Sometimes it can take up to 1 week for stress fractures to show up on an X-ray, and may need reimaging or a CT or MRI of the affected area.

## 2020-04-03 ENCOUNTER — PATIENT MESSAGE (OUTPATIENT)
Dept: TRANSPLANT | Facility: CLINIC | Age: 65
End: 2020-04-03

## 2020-04-03 ENCOUNTER — TELEPHONE (OUTPATIENT)
Dept: TRANSPLANT | Facility: CLINIC | Age: 65
End: 2020-04-03

## 2020-04-03 ENCOUNTER — PATIENT MESSAGE (OUTPATIENT)
Dept: INTERNAL MEDICINE | Facility: CLINIC | Age: 65
End: 2020-04-03

## 2020-04-03 ENCOUNTER — LAB VISIT (OUTPATIENT)
Dept: LAB | Facility: HOSPITAL | Age: 65
End: 2020-04-03
Attending: INTERNAL MEDICINE
Payer: MEDICARE

## 2020-04-03 ENCOUNTER — PATIENT MESSAGE (OUTPATIENT)
Dept: NEPHROLOGY | Facility: CLINIC | Age: 65
End: 2020-04-03

## 2020-04-03 ENCOUNTER — ANTI-COAG VISIT (OUTPATIENT)
Dept: CARDIOLOGY | Facility: CLINIC | Age: 65
End: 2020-04-03
Payer: MEDICARE

## 2020-04-03 DIAGNOSIS — I47.10 SUPRAVENTRICULAR TACHYCARDIA: ICD-10-CM

## 2020-04-03 DIAGNOSIS — Z79.01 LONG TERM (CURRENT) USE OF ANTICOAGULANTS: ICD-10-CM

## 2020-04-03 DIAGNOSIS — I48.91 ATRIAL FIBRILLATION WITH RAPID VENTRICULAR RESPONSE: ICD-10-CM

## 2020-04-03 LAB
INR PPP: 2.7 (ref 0.8–1.2)
PROTHROMBIN TIME: 26 SEC (ref 9–12.5)

## 2020-04-03 PROCEDURE — 85610 PROTHROMBIN TIME: CPT | Mod: HCNC

## 2020-04-03 PROCEDURE — 93793 PR ANTICOAGULANT MGMT FOR PT TAKING WARFARIN: ICD-10-PCS | Mod: S$GLB,,, | Performed by: PHARMACIST

## 2020-04-03 PROCEDURE — 36415 COLL VENOUS BLD VENIPUNCTURE: CPT | Mod: HCNC

## 2020-04-03 PROCEDURE — 93793 ANTICOAG MGMT PT WARFARIN: CPT | Mod: S$GLB,,, | Performed by: PHARMACIST

## 2020-04-07 ENCOUNTER — PATIENT OUTREACH (OUTPATIENT)
Dept: ADMINISTRATIVE | Facility: OTHER | Age: 65
End: 2020-04-07

## 2020-04-07 ENCOUNTER — TELEPHONE (OUTPATIENT)
Dept: CARDIOLOGY | Facility: CLINIC | Age: 65
End: 2020-04-07

## 2020-04-07 DIAGNOSIS — E11.9 DIABETES MELLITUS WITHOUT COMPLICATION: Primary | ICD-10-CM

## 2020-04-08 ENCOUNTER — LAB VISIT (OUTPATIENT)
Dept: LAB | Facility: HOSPITAL | Age: 65
End: 2020-04-08
Payer: MEDICARE

## 2020-04-08 ENCOUNTER — TELEPHONE (OUTPATIENT)
Dept: NEPHROLOGY | Facility: CLINIC | Age: 65
End: 2020-04-08

## 2020-04-08 ENCOUNTER — ANTI-COAG VISIT (OUTPATIENT)
Dept: CARDIOLOGY | Facility: CLINIC | Age: 65
End: 2020-04-08
Payer: MEDICARE

## 2020-04-08 ENCOUNTER — OFFICE VISIT (OUTPATIENT)
Dept: CARDIOLOGY | Facility: CLINIC | Age: 65
End: 2020-04-08
Payer: MEDICARE

## 2020-04-08 DIAGNOSIS — I48.0 PAROXYSMAL ATRIAL FIBRILLATION: Primary | ICD-10-CM

## 2020-04-08 DIAGNOSIS — E78.2 MIXED HYPERLIPIDEMIA: ICD-10-CM

## 2020-04-08 DIAGNOSIS — I47.10 SUPRAVENTRICULAR TACHYCARDIA: ICD-10-CM

## 2020-04-08 DIAGNOSIS — I48.91 ATRIAL FIBRILLATION WITH RAPID VENTRICULAR RESPONSE: ICD-10-CM

## 2020-04-08 DIAGNOSIS — Z79.01 LONG TERM (CURRENT) USE OF ANTICOAGULANTS: ICD-10-CM

## 2020-04-08 DIAGNOSIS — R07.9 CHEST PAIN, UNSPECIFIED TYPE: ICD-10-CM

## 2020-04-08 DIAGNOSIS — I13.0 HYPERTENSIVE HEART AND RENAL DISEASE WITH RENAL FAILURE, STAGE 1 THROUGH STAGE 4 OR UNSPECIFIED CHRONIC KIDNEY DISEASE, WITH HEART FAILURE: ICD-10-CM

## 2020-04-08 DIAGNOSIS — I25.10 CORONARY ARTERY DISEASE INVOLVING NATIVE CORONARY ARTERY OF NATIVE HEART WITHOUT ANGINA PECTORIS: ICD-10-CM

## 2020-04-08 DIAGNOSIS — I50.32 CHRONIC DIASTOLIC HEART FAILURE: ICD-10-CM

## 2020-04-08 DIAGNOSIS — Z94.0 KIDNEY REPLACED BY TRANSPLANT: ICD-10-CM

## 2020-04-08 DIAGNOSIS — Z72.0 TOBACCO USE: ICD-10-CM

## 2020-04-08 DIAGNOSIS — Z94.0 H/O KIDNEY TRANSPLANT: ICD-10-CM

## 2020-04-08 LAB
ALBUMIN SERPL BCP-MCNC: 3.2 G/DL (ref 3.5–5.2)
ALBUMIN SERPL BCP-MCNC: 3.2 G/DL (ref 3.5–5.2)
ALP SERPL-CCNC: 72 U/L (ref 55–135)
ALT SERPL W/O P-5'-P-CCNC: 51 U/L (ref 10–44)
ANION GAP SERPL CALC-SCNC: 12 MMOL/L (ref 8–16)
AST SERPL-CCNC: 25 U/L (ref 10–40)
BASOPHILS # BLD AUTO: 0.02 K/UL (ref 0–0.2)
BASOPHILS NFR BLD: 0.2 % (ref 0–1.9)
BILIRUB DIRECT SERPL-MCNC: 0.2 MG/DL (ref 0.1–0.3)
BILIRUB SERPL-MCNC: 0.3 MG/DL (ref 0.1–1)
BUN SERPL-MCNC: 30 MG/DL (ref 8–23)
CALCIUM SERPL-MCNC: 7.1 MG/DL (ref 8.7–10.5)
CHLORIDE SERPL-SCNC: 106 MMOL/L (ref 95–110)
CO2 SERPL-SCNC: 22 MMOL/L (ref 23–29)
CREAT SERPL-MCNC: 2 MG/DL (ref 0.5–1.4)
DIFFERENTIAL METHOD: ABNORMAL
EOSINOPHIL # BLD AUTO: 0.3 K/UL (ref 0–0.5)
EOSINOPHIL NFR BLD: 2.8 % (ref 0–8)
ERYTHROCYTE [DISTWIDTH] IN BLOOD BY AUTOMATED COUNT: 14.7 % (ref 11.5–14.5)
EST. GFR  (AFRICAN AMERICAN): 39.3 ML/MIN/1.73 M^2
EST. GFR  (NON AFRICAN AMERICAN): 34 ML/MIN/1.73 M^2
GLUCOSE SERPL-MCNC: 98 MG/DL (ref 70–110)
HCT VFR BLD AUTO: 35.5 % (ref 40–54)
HGB BLD-MCNC: 10.6 G/DL (ref 14–18)
IMM GRANULOCYTES # BLD AUTO: 0.05 K/UL (ref 0–0.04)
IMM GRANULOCYTES NFR BLD AUTO: 0.5 % (ref 0–0.5)
INR PPP: 2.7 (ref 0.8–1.2)
LYMPHOCYTES # BLD AUTO: 1.6 K/UL (ref 1–4.8)
LYMPHOCYTES NFR BLD: 16.8 % (ref 18–48)
MAGNESIUM SERPL-MCNC: 2.2 MG/DL (ref 1.6–2.6)
MCH RBC QN AUTO: 24.5 PG (ref 27–31)
MCHC RBC AUTO-ENTMCNC: 29.9 G/DL (ref 32–36)
MCV RBC AUTO: 82 FL (ref 82–98)
MONOCYTES # BLD AUTO: 0.7 K/UL (ref 0.3–1)
MONOCYTES NFR BLD: 7.1 % (ref 4–15)
NEUTROPHILS # BLD AUTO: 6.7 K/UL (ref 1.8–7.7)
NEUTROPHILS NFR BLD: 72.6 % (ref 38–73)
NRBC BLD-RTO: 0 /100 WBC
PHOSPHATE SERPL-MCNC: 4.2 MG/DL (ref 2.7–4.5)
PLATELET # BLD AUTO: 244 K/UL (ref 150–350)
PMV BLD AUTO: 12.4 FL (ref 9.2–12.9)
POTASSIUM SERPL-SCNC: 3.8 MMOL/L (ref 3.5–5.1)
PROT SERPL-MCNC: 6.7 G/DL (ref 6–8.4)
PROTHROMBIN TIME: 25.8 SEC (ref 9–12.5)
RBC # BLD AUTO: 4.32 M/UL (ref 4.6–6.2)
SODIUM SERPL-SCNC: 140 MMOL/L (ref 136–145)
TACROLIMUS BLD-MCNC: 4 NG/ML (ref 5–15)
WBC # BLD AUTO: 9.26 K/UL (ref 3.9–12.7)

## 2020-04-08 PROCEDURE — 85025 COMPLETE CBC W/AUTO DIFF WBC: CPT | Mod: HCNC

## 2020-04-08 PROCEDURE — 99499 UNLISTED E&M SERVICE: CPT | Mod: HCNC,95,, | Performed by: INTERNAL MEDICINE

## 2020-04-08 PROCEDURE — 1101F PT FALLS ASSESS-DOCD LE1/YR: CPT | Mod: HCNC,CPTII,95, | Performed by: INTERNAL MEDICINE

## 2020-04-08 PROCEDURE — 99214 PR OFFICE/OUTPT VISIT, EST, LEVL IV, 30-39 MIN: ICD-10-PCS | Mod: HCNC,95,, | Performed by: INTERNAL MEDICINE

## 2020-04-08 PROCEDURE — 80069 RENAL FUNCTION PANEL: CPT | Mod: HCNC

## 2020-04-08 PROCEDURE — 83735 ASSAY OF MAGNESIUM: CPT | Mod: HCNC

## 2020-04-08 PROCEDURE — 85610 PROTHROMBIN TIME: CPT | Mod: HCNC

## 2020-04-08 PROCEDURE — 1101F PR PT FALLS ASSESS DOC 0-1 FALLS W/OUT INJ PAST YR: ICD-10-PCS | Mod: HCNC,CPTII,95, | Performed by: INTERNAL MEDICINE

## 2020-04-08 PROCEDURE — 99499 RISK ADDL DX/OHS AUDIT: ICD-10-PCS | Mod: HCNC,95,, | Performed by: INTERNAL MEDICINE

## 2020-04-08 PROCEDURE — 80197 ASSAY OF TACROLIMUS: CPT | Mod: HCNC

## 2020-04-08 PROCEDURE — 36415 COLL VENOUS BLD VENIPUNCTURE: CPT | Mod: HCNC

## 2020-04-08 PROCEDURE — 84075 ASSAY ALKALINE PHOSPHATASE: CPT | Mod: HCNC

## 2020-04-08 PROCEDURE — 99214 OFFICE O/P EST MOD 30 MIN: CPT | Mod: HCNC,95,, | Performed by: INTERNAL MEDICINE

## 2020-04-08 NOTE — TELEPHONE ENCOUNTER
Pt's labs were drawn today and Prograf will not be resulted until this evening. Please move appt to tomorrow, or when convenient for pt. These labs were ordered by Dr. Chairez. Thank you

## 2020-04-08 NOTE — PROGRESS NOTES
Subjective:   Patient ID:  Ibrahima Phelps is a 64 y.o. male who presents for follow-up of No chief complaint on file.      HPI: The patient location is: home  The chief complaint leading to consultation is: Hospital follow up  Visit type: Virtual visit with synchronous audio and video  Total time spent with patient: 18 minutes  Each patient to whom he or she provides medical services by telemedicine is:  (1) informed of the relationship between the physician and patient and the respective role of any other health care provider with respect to management of the patient; and (2) notified that he or she may decline to receive medical services by telemedicine and may withdraw from such care at any time.    Notes:  Admitted 3/24 for sharp reproducible chest pain and shortness of breath, similar to previous bouts of pericarditis thought to be secondary to costochondritis. Peak troponin 0.5. BNP > 400. Normal inflammatory markers. Echo without pericardial effusion. ECG unchanged. Given 40 mg IV lasix and lidocaine patch for pain. Since discharge, his symptoms have continued. He reports DAUGHERTY, PND, orthopnea and mild LE edema. He does not weigh himself. He completed a steroid taper yesterday given to him by Urgent Care for neck pain. While on steroids, his chest pain did not improve. He denies fever or cough. He was started on hydralazine 50 mg tid and reports symptomatic hypotension since starting the medication.    Echo 3/20:Conclusion     · Concentric left ventricular remodeling.  · Normal left ventricular systolic function. The estimated ejection fraction is 60%.  · Normal right ventricular systolic function.  · Normal LV diastolic function.  · Aortic sclerosis without significant stenosis.  · Intermediate central venous pressure (8 mmHg).     SPECT 6/19:     Impression       1. Scintigraphically negative for ischemia or infarct.  2. The global left ventricular systolic function is normal with an LV ejection fraction of 55  % and mild LV dilatation.  Wall motion is normal.         Past Medical History:   Diagnosis Date    (HFpEF) heart failure with preserved ejection fraction 9/25/2017    Atrial fibrillation     CAD (coronary artery disease)     Chronic diastolic heart failure 4/8/2020    CKD (chronic kidney disease) stage 3, GFR 30-59 ml/min     Dr. Latif    Diverticulosis     Fever blister     Heart attack 2006    Hyperlipidemia     Hypertension     Immunodeficiency due to treatment with immunosuppressive medication     Keloid cicatrix     Metabolic bone disease     Pericarditis     S/P kidney transplant     Supraventricular tachycardia 06/2019    Thrombocytopenia     Thyroid disease     Unspecified disorder of kidney and ureter     Stage IIICKD       Past Surgical History:   Procedure Laterality Date    CARDIOVERSION  04/30/15    CHOLECYSTECTOMY      COLONOSCOPY  April 20, 2011    Diverticulosis, repeat recommended in 3 yrs., repeat colonoscopy 2014 revealed 2 polyps.  He should return in 5 years.    COLONOSCOPY N/A 3/13/2020    Procedure: COLONOSCOPY;  Surgeon: Chon Casper MD;  Location: [facility] ([address] [place] [address]);  Service: Endoscopy;  Laterality: N/A;  ok to hold coumadin x5days-see telephone encounter 2/4/20-tb    CORONARY ANGIOPLASTY WITH STENT PLACEMENT  9/13/2006    KIDNEY TRANSPLANT  2005    PARATHYROIDECTOMY         Social History     Socioeconomic History    Marital status:      Spouse name: Not on file    Number of children: Not on file    Years of education: Not on file    Highest education level: Not on file   Occupational History    Occupation:    Social Needs    Financial resource strain: Not on file    Food insecurity:     Worry: Not on file     Inability: Not on file    Transportation needs:     Medical: Not on file     Non-medical: Not on file   Tobacco Use    Smoking status: Current Some Day Smoker     Packs/day: 0.50     Years: 40.00     Pack years: 20.00      Types: Cigarettes    Smokeless tobacco: Never Used    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   Substance and Sexual Activity    Alcohol use: No    Drug use: No    Sexual activity: Yes     Partners: Female   Lifestyle    Physical activity:     Days per week: Not on file     Minutes per session: Not on file    Stress: Not on file   Relationships    Social connections:     Talks on phone: Not on file     Gets together: Not on file     Attends Voodoo service: Not on file     Active member of club or organization: Not on file     Attends meetings of clubs or organizations: Not on file     Relationship status: Not on file   Other Topics Concern    Not on file   Social History Narrative    Retired        Family History   Problem Relation Age of Onset    No Known Problems Sister     No Known Problems Brother     Thyroid disease Mother         s/p surgery    Heart disease Father         had pacemaker    No Known Problems Sister     Kidney failure Sister     Kidney disease Sister     No Known Problems Sister     Kidney disease Brother     Kidney failure Brother         s/p transplant    Diabetes Mellitus Neg Hx     Stroke Neg Hx     Heart attack Neg Hx     Cancer Neg Hx     Celiac disease Neg Hx     Cirrhosis Neg Hx     Colon cancer Neg Hx     Esophageal cancer Neg Hx     Inflammatory bowel disease Neg Hx     Rectal cancer Neg Hx     Stomach cancer Neg Hx     Ulcerative colitis Neg Hx     Liver disease Neg Hx     Liver cancer Neg Hx     Crohn's disease Neg Hx     Melanoma Neg Hx        Patient's Medications   New Prescriptions    No medications on file   Previous Medications    ALBUTEROL (ACCUNEB) 1.25 MG/3 ML NEBU    Take 3 mLs (1.25 mg total) by nebulization every 6 (six) hours as needed. Rescue    ASPIRIN (ADULT ASPIRIN EC LOW STRENGTH) 81 MG EC TABLET    Take 1 tablet by mouth Daily.    ATORVASTATIN (LIPITOR) 10 MG  TABLET    Take 1 tablet (10 mg total) by mouth once daily.    CALCIUM CARBONATE (OS-TRISH) 500 MG CALCIUM (1,250 MG) TABLET    Two by mouth twice a day    CARVEDILOL (COREG) 25 MG TABLET    Take 1 tablet (25 mg total) by mouth 2 (two) times daily with meals.    COLCHICINE (COLCRYS) 0.6 MG TABLET    One po BID up to three days prn gout flare    DOXAZOSIN (CARDURA) 4 MG TABLET    Take 1 tablet (4 mg total) by mouth every 12 (twelve) hours.    FEXOFENADINE (ALLEGRA) 60 MG TABLET    Take 1 tablet by mouth Twice daily as needed.    FLUTICASONE PROPIONATE (FLONASE) 50 MCG/ACTUATION NASAL SPRAY    1 spray (50 mcg total) by Each Nostril route once daily.    FUROSEMIDE (LASIX) 40 MG TABLET    Take 0.5 tablets (20 mg total) by mouth 2 (two) times daily.    GABAPENTIN (NEURONTIN) 300 MG CAPSULE    Take 1 capsule (300 mg total) by mouth 2 (two) times daily.    HYDRALAZINE (APRESOLINE) 50 MG TABLET    Take 1 tablet (50 mg total) by mouth every 8 (eight) hours.    METHYLPREDNISOLONE (MEDROL DOSEPACK) 4 MG TABLET    use as directed    MULTIVITAMIN (THERAGRAN) PER TABLET    Take 1 tablet by mouth Daily.    MYCOPHENOLATE (CELLCEPT) 250 MG CAP    Take 2 capsules (500 mg total) by mouth 2 (two) times daily.    NICOTINE (NICODERM CQ) 21 MG/24 HR    Place 1 patch onto the skin once daily.    NIFEDIPINE (PROCARDIA-XL) 90 MG (OSM) 24 HR TABLET    Take 1 tablet (90 mg total) by mouth once daily.    PARICALCITOL (ZEMPLAR) 1 MCG CAPSULE    One po MWF    POTASSIUM CHLORIDE (KLOR-CON) 10 MEQ TBSR    Take 1 tablet (10 mEq total) by mouth once daily.    PREDNISONE (DELTASONE) 5 MG TABLET    Take 1 tablet (5 mg total) by mouth every morning.    RANITIDINE (ZANTAC) 150 MG TABLET    Take 1 tablet (150 mg total) by mouth 2 (two) times daily.    SPIRONOLACTONE (ALDACTONE) 25 MG TABLET    Take 1 tablet (25 mg total) by mouth once daily.    TACROLIMUS (PROGRAF) 1 MG CAP    Take 2 capsules (2 mg total) by mouth every 12 (twelve) hours. TAKE 2 CAPSULES BY  MOUTH EVERY MORNING AND 2 CAPSULES EVERY EVENING.  Z94.0 kidney transplant    TRIAMCINOLONE ACETONIDE 0.1% (KENALOG) 0.1 % CREAM    AAA bid to rash on ankles    VITAMIN D 1000 UNITS TAB    Take 370 mg by mouth 2 (two) times daily. 2 tablets BID    WARFARIN (COUMADIN) 5 MG TABLET    Take 0.5-1 pill by mouth daily Or as directed by [facility]; #75 pills = 3-month supply   Modified Medications    No medications on file   Discontinued Medications    No medications on file       ROS    There were no vitals taken for this visit.    Objective:   Physical Exam    Lab Results   Component Value Date     04/08/2020    K 3.8 04/08/2020     04/08/2020    CO2 22 (L) 04/08/2020    BUN 30 (H) 04/08/2020    CREATININE 2.0 (H) 04/08/2020    GLU 98 04/08/2020    HGBA1C 5.9 (H) 11/15/2019    MG 2.2 04/08/2020    AST 25 04/08/2020    ALT 51 (H) 04/08/2020    ALBUMIN 3.2 (L) 04/08/2020    ALBUMIN 3.2 (L) 04/08/2020    PROT 6.7 04/08/2020    BILITOT 0.3 04/08/2020    WBC 9.26 04/08/2020    HGB 10.6 (L) 04/08/2020    HCT 35.5 (L) 04/08/2020    MCV 82 04/08/2020     04/08/2020    INR 2.7 (H) 04/08/2020    TSH 1.434 02/02/2019    CHOL 130 04/12/2019    HDL 39 (L) 04/12/2019    LDLCALC 69.4 04/12/2019    TRIG 108 04/12/2019     (H) 03/23/2020       Assessment:     1. Paroxysmal atrial fibrillation : Continue coumadin.   2. Coronary artery disease involving native coronary artery of native heart without angina pectoris : Continue asa and statin therapy.   3. Mixed hyperlipidemia : Lipids are at goal. Continue statin therapy.   4. 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 Recommend decreasing dose to 25 mg tid.   5. H/O kidney transplant    6. Tobacco use    7. Chronic diastolic heart failure : It sounds like he is still volume overloaded. Recommend  increasing lasix to 40 mg twice daily. BMP next week.   8. Chest pain, unspecified type : No improvement with steroids. Doubt ACS. Will see if symptoms improve with diuresis.       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 Please do not hesitate to contact me with any questions or concerns.

## 2020-04-09 ENCOUNTER — TELEPHONE (OUTPATIENT)
Dept: TRANSPLANT | Facility: CLINIC | Age: 65
End: 2020-04-09

## 2020-04-09 ENCOUNTER — OFFICE VISIT (OUTPATIENT)
Dept: NEPHROLOGY | Facility: CLINIC | Age: 65
End: 2020-04-09
Payer: MEDICARE

## 2020-04-09 DIAGNOSIS — K57.90 DIVERTICULOSIS OF INTESTINE WITHOUT BLEEDING, UNSPECIFIED INTESTINAL TRACT LOCATION: ICD-10-CM

## 2020-04-09 DIAGNOSIS — J43.8 OTHER EMPHYSEMA: ICD-10-CM

## 2020-04-09 DIAGNOSIS — E55.9 VITAMIN D DEFICIENCY: ICD-10-CM

## 2020-04-09 DIAGNOSIS — I13.0 HYPERTENSIVE HEART AND RENAL DISEASE WITH RENAL FAILURE, STAGE 1 THROUGH STAGE 4 OR UNSPECIFIED CHRONIC KIDNEY DISEASE, WITH HEART FAILURE: ICD-10-CM

## 2020-04-09 DIAGNOSIS — D63.1 ANEMIA IN STAGE 3 CHRONIC KIDNEY DISEASE: ICD-10-CM

## 2020-04-09 DIAGNOSIS — E66.9 OBESITY (BMI 30-39.9): ICD-10-CM

## 2020-04-09 DIAGNOSIS — D84.821 IMMUNODEFICIENCY DUE TO TREATMENT WITH IMMUNOSUPPRESSIVE MEDICATION: ICD-10-CM

## 2020-04-09 DIAGNOSIS — N25.81 SECONDARY RENAL HYPERPARATHYROIDISM: ICD-10-CM

## 2020-04-09 DIAGNOSIS — I50.32 CHRONIC HEART FAILURE WITH PRESERVED EJECTION FRACTION: ICD-10-CM

## 2020-04-09 DIAGNOSIS — D50.8 OTHER IRON DEFICIENCY ANEMIA: ICD-10-CM

## 2020-04-09 DIAGNOSIS — I25.10 CORONARY ARTERY DISEASE INVOLVING NATIVE CORONARY ARTERY OF NATIVE HEART WITHOUT ANGINA PECTORIS: ICD-10-CM

## 2020-04-09 DIAGNOSIS — D63.1 ANEMIA DUE TO STAGE 3 CHRONIC KIDNEY DISEASE: ICD-10-CM

## 2020-04-09 DIAGNOSIS — Z94.0 H/O KIDNEY TRANSPLANT: ICD-10-CM

## 2020-04-09 DIAGNOSIS — E11.9 TYPE 2 DIABETES MELLITUS WITHOUT COMPLICATION, WITHOUT LONG-TERM CURRENT USE OF INSULIN: ICD-10-CM

## 2020-04-09 DIAGNOSIS — Z94.0 IMMUNOSUPPRESSIVE MANAGEMENT ENCOUNTER FOLLOWING KIDNEY TRANSPLANT: ICD-10-CM

## 2020-04-09 DIAGNOSIS — N18.30 ANEMIA IN STAGE 3 CHRONIC KIDNEY DISEASE: ICD-10-CM

## 2020-04-09 DIAGNOSIS — N18.30 ANEMIA DUE TO STAGE 3 CHRONIC KIDNEY DISEASE: ICD-10-CM

## 2020-04-09 DIAGNOSIS — N18.30 CKD (CHRONIC KIDNEY DISEASE) STAGE 3, GFR 30-59 ML/MIN: Primary | ICD-10-CM

## 2020-04-09 DIAGNOSIS — Z94.0 KIDNEY TRANSPLANT RECIPIENT: ICD-10-CM

## 2020-04-09 DIAGNOSIS — Z72.0 TOBACCO USE: ICD-10-CM

## 2020-04-09 DIAGNOSIS — I48.0 PAROXYSMAL ATRIAL FIBRILLATION: ICD-10-CM

## 2020-04-09 DIAGNOSIS — Z79.899 IMMUNOSUPPRESSIVE MANAGEMENT ENCOUNTER FOLLOWING KIDNEY TRANSPLANT: ICD-10-CM

## 2020-04-09 DIAGNOSIS — Z86.79 HISTORY OF PERICARDITIS: ICD-10-CM

## 2020-04-09 DIAGNOSIS — E78.2 MIXED HYPERLIPIDEMIA: ICD-10-CM

## 2020-04-09 DIAGNOSIS — I50.30 HEART FAILURE WITH PRESERVED EJECTION FRACTION, UNSPECIFIED HF CHRONICITY: ICD-10-CM

## 2020-04-09 DIAGNOSIS — I10 ESSENTIAL HYPERTENSION: ICD-10-CM

## 2020-04-09 DIAGNOSIS — R73.9 HYPERGLYCEMIA: ICD-10-CM

## 2020-04-09 DIAGNOSIS — R73.03 PREDIABETES: ICD-10-CM

## 2020-04-09 DIAGNOSIS — Z79.899 IMMUNODEFICIENCY DUE TO TREATMENT WITH IMMUNOSUPPRESSIVE MEDICATION: ICD-10-CM

## 2020-04-09 DIAGNOSIS — Z79.01 LONG TERM (CURRENT) USE OF ANTICOAGULANTS: ICD-10-CM

## 2020-04-09 DIAGNOSIS — M89.8X9 METABOLIC BONE DISEASE: ICD-10-CM

## 2020-04-09 DIAGNOSIS — R21 RASH: ICD-10-CM

## 2020-04-09 PROCEDURE — 99499 RISK ADDL DX/OHS AUDIT: ICD-10-PCS | Mod: HCNC,95,, | Performed by: INTERNAL MEDICINE

## 2020-04-09 PROCEDURE — 3044F HG A1C LEVEL LT 7.0%: CPT | Mod: HCNC,CPTII,95, | Performed by: INTERNAL MEDICINE

## 2020-04-09 PROCEDURE — 3044F PR MOST RECENT HEMOGLOBIN A1C LEVEL <7.0%: ICD-10-PCS | Mod: HCNC,CPTII,95, | Performed by: INTERNAL MEDICINE

## 2020-04-09 PROCEDURE — 1101F PT FALLS ASSESS-DOCD LE1/YR: CPT | Mod: HCNC,CPTII,95, | Performed by: INTERNAL MEDICINE

## 2020-04-09 PROCEDURE — 99214 PR OFFICE/OUTPT VISIT, EST, LEVL IV, 30-39 MIN: ICD-10-PCS | Mod: HCNC,95,, | Performed by: INTERNAL MEDICINE

## 2020-04-09 PROCEDURE — 99499 UNLISTED E&M SERVICE: CPT | Mod: HCNC,95,, | Performed by: INTERNAL MEDICINE

## 2020-04-09 PROCEDURE — 1101F PR PT FALLS ASSESS DOC 0-1 FALLS W/OUT INJ PAST YR: ICD-10-PCS | Mod: HCNC,CPTII,95, | Performed by: INTERNAL MEDICINE

## 2020-04-09 PROCEDURE — 99214 OFFICE O/P EST MOD 30 MIN: CPT | Mod: HCNC,95,, | Performed by: INTERNAL MEDICINE

## 2020-04-09 NOTE — TELEPHONE ENCOUNTER
Sent to Dr Latif.    ----- Message from Layne Chairez MD sent at 4/9/2020 11:01 AM CDT -----  Defer management of hypocalcemia to his general nephrologist

## 2020-04-13 ENCOUNTER — PATIENT MESSAGE (OUTPATIENT)
Dept: NEPHROLOGY | Facility: CLINIC | Age: 65
End: 2020-04-13

## 2020-04-13 ENCOUNTER — OFFICE VISIT (OUTPATIENT)
Dept: URGENT CARE | Facility: CLINIC | Age: 65
End: 2020-04-13
Payer: MEDICARE

## 2020-04-13 VITALS
HEIGHT: 73 IN | OXYGEN SATURATION: 100 % | WEIGHT: 237 LBS | RESPIRATION RATE: 19 BRPM | SYSTOLIC BLOOD PRESSURE: 168 MMHG | HEART RATE: 60 BPM | BODY MASS INDEX: 31.41 KG/M2 | DIASTOLIC BLOOD PRESSURE: 74 MMHG | TEMPERATURE: 97 F

## 2020-04-13 DIAGNOSIS — M25.521 RIGHT ELBOW PAIN: ICD-10-CM

## 2020-04-13 DIAGNOSIS — M70.31 BURSITIS OF RIGHT ELBOW, UNSPECIFIED BURSA: Primary | ICD-10-CM

## 2020-04-13 PROCEDURE — 73080 X-RAY EXAM OF ELBOW: CPT | Mod: FY,RT,S$GLB, | Performed by: RADIOLOGY

## 2020-04-13 PROCEDURE — 99214 OFFICE O/P EST MOD 30 MIN: CPT | Mod: 25,S$GLB,, | Performed by: NURSE PRACTITIONER

## 2020-04-13 PROCEDURE — 96372 THER/PROPH/DIAG INJ SC/IM: CPT | Mod: S$GLB,,, | Performed by: NURSE PRACTITIONER

## 2020-04-13 PROCEDURE — 99214 PR OFFICE/OUTPT VISIT, EST, LEVL IV, 30-39 MIN: ICD-10-PCS | Mod: 25,S$GLB,, | Performed by: NURSE PRACTITIONER

## 2020-04-13 PROCEDURE — 96372 PR INJECTION,THERAP/PROPH/DIAG2ST, IM OR SUBCUT: ICD-10-PCS | Mod: S$GLB,,, | Performed by: NURSE PRACTITIONER

## 2020-04-13 PROCEDURE — 73080 XR ELBOW COMPLETE 3 VIEW RIGHT: ICD-10-PCS | Mod: FY,RT,S$GLB, | Performed by: RADIOLOGY

## 2020-04-13 RX ORDER — BETAMETHASONE SODIUM PHOSPHATE AND BETAMETHASONE ACETATE 3; 3 MG/ML; MG/ML
6 INJECTION, SUSPENSION INTRA-ARTICULAR; INTRALESIONAL; INTRAMUSCULAR; SOFT TISSUE
Status: COMPLETED | OUTPATIENT
Start: 2020-04-13 | End: 2020-04-13

## 2020-04-13 RX ORDER — METHYLPREDNISOLONE 4 MG/1
TABLET ORAL
Qty: 1 PACKAGE | Refills: 0 | Status: SHIPPED | OUTPATIENT
Start: 2020-04-13 | End: 2020-04-13

## 2020-04-13 RX ORDER — BETAMETHASONE SODIUM PHOSPHATE AND BETAMETHASONE ACETATE 3; 3 MG/ML; MG/ML
6 INJECTION, SUSPENSION INTRA-ARTICULAR; INTRALESIONAL; INTRAMUSCULAR; SOFT TISSUE ONCE
Qty: 1 ML | Refills: 0 | Status: SHIPPED | OUTPATIENT
Start: 2020-04-13 | End: 2020-04-13 | Stop reason: CLARIF

## 2020-04-13 RX ADMIN — BETAMETHASONE SODIUM PHOSPHATE AND BETAMETHASONE ACETATE 6 MG: 3; 3 INJECTION, SUSPENSION INTRA-ARTICULAR; INTRALESIONAL; INTRAMUSCULAR; SOFT TISSUE at 09:04

## 2020-04-13 NOTE — PROGRESS NOTES
"Subjective:       Patient ID: Ibrahima Phelps is a 64 y.o. male.    Vitals:  height is 6' 1.2" (1.859 m) and weight is 107.5 kg (237 lb). His oral temperature is 97.2 °F (36.2 °C). His blood pressure is 168/74 (abnormal) and his pulse is 60. His respiration is 19 and oxygen saturation is 100%.     Chief Complaint: Elbow Pain    Patient's right elbow pain began Friday night. Patient describes pain as stabbing.    Elbow Pain   This is a new problem. The current episode started in the past 7 days (3 days). The problem occurs constantly. The problem has been unchanged. Pertinent negatives include no abdominal pain, anorexia, arthralgias, change in bowel habit, chest pain, chills, congestion, coughing, diaphoresis, fatigue, fever, headaches, joint swelling, myalgias, nausea, neck pain, numbness, rash, sore throat, swollen glands, urinary symptoms, vertigo, visual change, vomiting or weakness. The symptoms are aggravated by bending. Treatments tried: Steroid cream. The treatment provided no relief.       Constitution: Negative for chills, sweating, fatigue and fever.   HENT: Negative for congestion and sore throat.    Neck: Negative for neck pain and painful lymph nodes.   Cardiovascular: Negative for chest pain and leg swelling.   Eyes: Negative for double vision and blurred vision.   Respiratory: Negative for cough and shortness of breath.    Gastrointestinal: Negative for abdominal pain, nausea, vomiting and diarrhea.   Genitourinary: Negative for dysuria, frequency and urgency.   Musculoskeletal: Negative for joint pain, joint swelling, muscle cramps and muscle ache.   Skin: Negative for color change, pale and rash.   Allergic/Immunologic: Negative for seasonal allergies.   Neurological: Negative for dizziness, history of vertigo, light-headedness, passing out, headaches and numbness.   Hematologic/Lymphatic: Negative for swollen lymph nodes, easy bruising/bleeding and history of blood clots. Does not bruise/bleed " easily.   Psychiatric/Behavioral: Negative for nervous/anxious, sleep disturbance and depression. The patient is not nervous/anxious.        Objective:      Physical Exam   Constitutional: He is oriented to person, place, and time. He appears well-developed and well-nourished.   HENT:   Head: Normocephalic.   Right Ear: External ear normal.   Left Ear: External ear normal.   Nose: Nose normal.   Mouth/Throat: Oropharynx is clear and moist.   Eyes: Pupils are equal, round, and reactive to light. Lids are normal. Right conjunctiva is injected.   Cardiovascular: Normal rate, regular rhythm and normal heart sounds.   Pulmonary/Chest: Effort normal and breath sounds normal.   Musculoskeletal: He exhibits edema and tenderness. He exhibits no deformity.        Right elbow: He exhibits decreased range of motion and swelling. He exhibits no effusion, no deformity and no laceration. Tenderness found. No radial head, no medial epicondyle, no lateral epicondyle and no olecranon process tenderness noted.   Neurological: He is alert and oriented to person, place, and time.   Nursing note and vitals reviewed.      Xr Elbow Complete 3 View Right    Result Date: 4/13/2020  EXAMINATION: XR ELBOW COMPLETE 3 VIEW RIGHT CLINICAL HISTORY: . Pain in right elbow TECHNIQUE: AP, lateral, and oblique views of the right elbow were performed. COMPARISON: None FINDINGS: No acute fracture or bony destructive process is seen.  There is no elevation of the fat pads about the elbow to suggest joint effusion.  There are degenerative changes with narrowing of the articulation between the proximal radius and ulna and the distal humerus.  There is spur formation.  There are some soft tissue calcifications anterior to the distal humerus.  Alignment is preserved.     As above. Electronically signed by: Mandie Zapata MD Date:    04/13/2020 Time:    09:38    X-ray Chest Ap Portable    Result Date: 3/23/2020  EXAMINATION: XR CHEST AP PORTABLE CLINICAL  "HISTORY: Provided history is "Chest Pain;  ". TECHNIQUE: One view of the chest. COMPARISON: CT chest, 02/11/2020.  Chest radiograph, 06/16/2019 and 09/24/2017. FINDINGS: Cardiac wires overlie the chest.  Lung volumes are relatively low.  Bilateral costophrenic angles are partially excluded from the field of view.  Scattered calcified granulomata are present.  There is no focal consolidation.  No sizable pleural effusion.  No pneumothorax.     No acute cardiopulmonary finding identified on this single view. Electronically signed by: Sd Altamirano MD Date:    03/23/2020 Time:    04:00    X-ray Cervical Spine 2 Or 3 Views    Result Date: 4/1/2020  EXAMINATION: XR CERVICAL SPINE 2 OR 3 VIEWS CLINICAL HISTORY: Cervicalgia TECHNIQUE: AP, lateral and open mouth views of the cervical spine were performed. COMPARISON: None. FINDINGS: Three views. C7 is partially obscured by soft tissues on lateral view.  Otherwise, lateral imaging demonstrates adequate alignment of the cervical spine without significant vertebral body height loss.  There is multilevel disc space height loss primarily involving C3-C4 and C4-C5..  There is prominent anterior marginal osteophytosis arising from C3, C4, and C5 with marked calcific density anterolateral to the C4 vertebral body.  The facets are aligned.  There is multilevel facet arthropathy.  AP spinal alignment is grossly unremarkable noting patient's head is tilted to the right.  The odontoid is intact.  The lateral masses of C1 are in anatomic relationship with C2.  The visualized upper lung zones are grossly clear noting there may be calcified granulomas projected over the left lung apex.  The paraspinous and hypopharyngeal soft tissues are unremarkable.  The airway is patent.     1. No acute displaced fracture or dislocation of the cervical spine noting prominent degenerative changes as described above. Electronically signed by: Noe Hammond MD Date:    04/01/2020 " Time:    12:54  Assessment:       1. Bursitis of right elbow, unspecified bursa    2. Right elbow pain        Plan:     Reviewed and discussed XR with patient.   Pt requesting steroid injection instead of oral. Reviewed side effects. Pt understands and would like to proceed. Discussed patient and plan of care with Dr. Abdi who agrees with plan.     Bursitis of right elbow, unspecified bursa  -     Discontinue: methylPREDNISolone (MEDROL DOSEPACK) 4 mg tablet; use as directed  Dispense: 1 Package; Refill: 0  -     Discontinue: betamethasone acetate-betamethasone sodium phosphate (CELESTONE) 6 mg/mL injection; Inject 1 mL (6 mg total) into the muscle once. for 1 dose  Dispense: 1 mL; Refill: 0  -     Ambulatory referral/consult to Orthopedics  -     betamethasone acetate-betamethasone sodium phosphate injection 6 mg    Right elbow pain  -     XR ELBOW COMPLETE 3 VIEW RIGHT; Future; Expected date: 04/13/2020  -     Discontinue: betamethasone acetate-betamethasone sodium phosphate (CELESTONE) 6 mg/mL injection; Inject 1 mL (6 mg total) into the muscle once. for 1 dose  Dispense: 1 mL; Refill: 0  -     Ambulatory referral/consult to Orthopedics  -     betamethasone acetate-betamethasone sodium phosphate injection 6 mg         Patient Instructions                                Orthopedic Follow up Discharge Instructions    If your condition worsens or fails to improve we recommend that you receive another evaluation at the ER immediately or contact your PCP to discuss your concerns or return here. You must understand that you've received an urgent care treatment only and that you may be released before all your medical problems are known or treated. You the patient will arrange for follouwp care as instructed.   If you were prescribed a narcotic or muscle relaxant do not drive or operate heavy machinery while taking these medications   Tylenol or ibuprofen can also be used as directed for pain unless you have an  allergy to them or medical condition such as stomach ulcers, kidney or liver disease or blood thinners etc for which you should not be taking these type of medications.   If you were given a prescription NSAID here do not also take any over the counter NSAID like ibuprofen, aleve, advil, motrin etc   RICE which means rest, ice compression and elevation are helpful.   If you have Low Back Pain and develop bowel or bladder symptoms or increase pain going down your legs go to the ER immediately.   If you were given a splint wear it at all times.   If you were given crutches use them as we instructed. Do not rest your armpits on the foam pad or you risk compressing the nerves and the vessels there   Follow up with the orthopedist in 1 week if you continue with pain.   Sometimes it can take up to 1 week for stress fractures to show up on an X-ray, and may need reimaging or a CT or MRI of the affected area.

## 2020-04-14 ENCOUNTER — TELEPHONE (OUTPATIENT)
Dept: TRANSPLANT | Facility: CLINIC | Age: 65
End: 2020-04-14

## 2020-04-14 RX ORDER — FAMOTIDINE 20 MG/1
20 TABLET, FILM COATED ORAL 2 TIMES DAILY
Qty: 180 TABLET | Refills: 1 | Status: SHIPPED | OUTPATIENT
Start: 2020-04-14 | End: 2020-10-05 | Stop reason: SDUPTHER

## 2020-04-14 NOTE — TELEPHONE ENCOUNTER
Please advise, what is best for this pt to take for heart burn. He states he can not get Ranitidine or Zantac.

## 2020-04-14 NOTE — TELEPHONE ENCOUNTER
Spoke to pt, reviewed labs and instructions below, p/c ratio added to next labs scheduled 5/13/20.    ----- Message from Merlene Colin MD sent at 4/13/2020  5:14 PM CDT -----  Results reviewed.  UA shows more proteinuria than in past.  Please repeat p/c ratio in a couple of weeks.

## 2020-04-15 ENCOUNTER — OFFICE VISIT (OUTPATIENT)
Dept: TRANSPLANT | Facility: CLINIC | Age: 65
End: 2020-04-15
Payer: MEDICARE

## 2020-04-15 DIAGNOSIS — Z79.899 IMMUNOSUPPRESSIVE MANAGEMENT ENCOUNTER FOLLOWING KIDNEY TRANSPLANT: ICD-10-CM

## 2020-04-15 DIAGNOSIS — M89.8X9 METABOLIC BONE DISEASE: ICD-10-CM

## 2020-04-15 DIAGNOSIS — Z94.0 H/O KIDNEY TRANSPLANT: Primary | ICD-10-CM

## 2020-04-15 DIAGNOSIS — D63.1 ANEMIA DUE TO STAGE 3 CHRONIC KIDNEY DISEASE: ICD-10-CM

## 2020-04-15 DIAGNOSIS — N18.30 CKD (CHRONIC KIDNEY DISEASE) STAGE 3, GFR 30-59 ML/MIN: ICD-10-CM

## 2020-04-15 DIAGNOSIS — Z79.01 LONG TERM (CURRENT) USE OF ANTICOAGULANTS: ICD-10-CM

## 2020-04-15 DIAGNOSIS — Z94.0 IMMUNOSUPPRESSIVE MANAGEMENT ENCOUNTER FOLLOWING KIDNEY TRANSPLANT: ICD-10-CM

## 2020-04-15 DIAGNOSIS — N18.30 ANEMIA DUE TO STAGE 3 CHRONIC KIDNEY DISEASE: ICD-10-CM

## 2020-04-15 PROCEDURE — 99499 RISK ADDL DX/OHS AUDIT: ICD-10-PCS | Mod: HCNC,95,, | Performed by: INTERNAL MEDICINE

## 2020-04-15 PROCEDURE — 1101F PT FALLS ASSESS-DOCD LE1/YR: CPT | Mod: HCNC,CPTII,95, | Performed by: INTERNAL MEDICINE

## 2020-04-15 PROCEDURE — 99499 UNLISTED E&M SERVICE: CPT | Mod: HCNC,95,, | Performed by: INTERNAL MEDICINE

## 2020-04-15 PROCEDURE — 99215 OFFICE O/P EST HI 40 MIN: CPT | Mod: HCNC,95,, | Performed by: INTERNAL MEDICINE

## 2020-04-15 PROCEDURE — 1101F PR PT FALLS ASSESS DOC 0-1 FALLS W/OUT INJ PAST YR: ICD-10-PCS | Mod: HCNC,CPTII,95, | Performed by: INTERNAL MEDICINE

## 2020-04-15 PROCEDURE — 99215 PR OFFICE/OUTPT VISIT, EST, LEVL V, 40-54 MIN: ICD-10-PCS | Mod: HCNC,95,, | Performed by: INTERNAL MEDICINE

## 2020-04-15 NOTE — PROGRESS NOTES
Kidney Post-Transplant Assessment    The patient location is: his home  The chief complaint leading to consultation is: post-kidney transplant follow-up visit  Visit type: audiovisual  Total time spent with patient: ~22 minutes  Each patient to whom he or she provides medical services by telemedicine is:  (1) informed of the relationship between the physician and patient and the respective role of any other health care provider with respect to management of the patient; and (2) notified that he or she may decline to receive medical services by telemedicine and may withdraw from such care at any time.    Notes: as below    Referring Physician:   Current Nephrologist: Emre Latif    ORGAN: LEFT KIDNEY  Donor Type: ?  PHS Increased Risk: ?  Cold Ischemia: 504 mins  Induction Medications: thymoglobulin    Subjective:     CC:  Reassessment of renal allograft function and management of chronic immunosuppression.    Kidney History:  Mr. Phelps is a 64 y.o. year old Black or  male with history of ESRD secondary to unclear etiology who was on dialysis started in ~Jan 1992 until receiving DDRT in July 1992 at [facility] which failed secondary to rejection and he resumed dialysis in April 1996 until receiving DDRT #2 (THYMO x3 induction, CMV D-/R+) on 4/12/05. He had transplant nephrectomy of DDRT #1 (possibly in 1997 vs 2005 around time of second transplant). He has CKD stage 3 - GFR 30-59 and his baseline creatinine is between 1.8 to 2.2. He takes mycophenolate mofetil, prednisone and tacrolimus for maintenance immunosuppression.     He has history of A-fib s/p DCCV in 4/2009 on warfarin, recurrent issues with pericarditis, gout, CAD s/p stent placement to LAD in 2006.    Interval History: Patient was last seen in transplant nephrology clinic on 4/12/19. He was recently admitted from 3/23/20 to 3/24/20 for chest pain felt secondary to hypertensive crisis vs costochondritis. States he denies any chest pain  since discharge but breathing didn't improve until about a week ago - states TRISHA Blunt and Dr. Latif are adjusting his diuretics. States home BPs are in the 130-140s/70-80s. States he is smoking 3-5 cigs/day     Review of Systems  Constitutional: +poor appetite sometimes - states it is his normal; Negative for fever, fatigue  HENT: +chronic tinnitus; Negative for hearing loss, sore throat and mouth sores.   Eyes: Negative for photophobia, pain and visual disturbance.   Respiratory: Negative for cough, SOB, and wheezing.   Cardiovascular: Negative for chest pain, palpitations, and leg swelling.   Gastrointestinal: Negative for nausea, vomiting, abdominal pain, diarrhea, constipation, blood in stool and abdominal distention.   Genitourinary: +urinary frequency when he was taking lasix 40 mg BID but states when he decreased to 40/20 alternating with 40 mg BID it is much better; Negative for dysuria, urgency, frequency, hematuria, decreased urine volume, difficulty urinating.   Musculoskeletal: +arthritis  Skin: Negative for pallor, rash and wound.   Neurological: +right handed tremor for the last 30 years but states it is actually better; +occasional lightheadedness;  Negative for syncope, weakness, headaches, dizziness/lightheadedness  Hematological: Negative for adenopathy or easy bruising  Psychiatric/Behavioral: Negative for confusion, sleep disturbance, and dysphoric mood. The patient is not nervous/anxious.     Medications:  Current Outpatient Medications   Medication Sig Dispense Refill    albuterol (ACCUNEB) 1.25 mg/3 mL Nebu Take 3 mLs (1.25 mg total) by nebulization every 6 (six) hours as needed. Rescue 1 Box 3    aspirin (ADULT ASPIRIN EC LOW STRENGTH) 81 MG EC tablet Take 1 tablet by mouth Daily.      atorvastatin (LIPITOR) 10 MG tablet Take 1 tablet (10 mg total) by mouth once daily. 90 tablet 3    calcium carbonate (OS-TRISH) 500 mg calcium (1,250 mg) tablet Two by mouth twice a day 120 tablet 11     carvediloL (COREG) 25 MG tablet Take 1 tablet (25 mg total) by mouth 2 (two) times daily with meals. 180 tablet 3    colchicine (COLCRYS) 0.6 mg tablet One po BID up to three days prn gout flare 30 tablet 11    doxazosin (CARDURA) 4 MG tablet Take 1 tablet (4 mg total) by mouth every 12 (twelve) hours. 180 tablet 3    famotidine (PEPCID) 20 MG tablet Take 1 tablet (20 mg total) by mouth 2 (two) times daily. 180 tablet 1    fexofenadine (ALLEGRA) 60 MG tablet Take 1 tablet by mouth Twice daily as needed.      fluticasone propionate (FLONASE) 50 mcg/actuation nasal spray 1 spray (50 mcg total) by Each Nostril route once daily. 18.2 mL 0    furosemide (LASIX) 40 MG tablet Take 0.5 tablets (20 mg total) by mouth 2 (two) times daily. 180 tablet 3    gabapentin (NEURONTIN) 300 MG capsule Take 1 capsule (300 mg total) by mouth 2 (two) times daily. 180 capsule 2    hydrALAZINE (APRESOLINE) 50 MG tablet Take 1 tablet (50 mg total) by mouth every 8 (eight) hours. (Patient taking differently: Take 25 mg by mouth every 8 (eight) hours. taking half of 50mg hydralazine tablet TID) 90 tablet 2    methylPREDNISolone (MEDROL DOSEPACK) 4 mg tablet use as directed (Patient not taking: Reported on 4/8/2020) 1 Package 0    multivitamin (THERAGRAN) per tablet Take 1 tablet by mouth Daily.      mycophenolate (CELLCEPT) 250 mg Cap Take 2 capsules (500 mg total) by mouth 2 (two) times daily. 360 capsule 3    nicotine (NICODERM CQ) 21 mg/24 hr Place 1 patch onto the skin once daily. 28 patch 4    NIFEdipine (PROCARDIA-XL) 90 MG (OSM) 24 hr tablet Take 1 tablet (90 mg total) by mouth once daily. 90 tablet 3    paricalcitol (ZEMPLAR) 1 MCG capsule One po MWF 90 capsule 3    potassium chloride (KLOR-CON) 10 MEQ TbSR Take 1 tablet (10 mEq total) by mouth once daily. 90 tablet 3    predniSONE (DELTASONE) 5 MG tablet Take 1 tablet (5 mg total) by mouth every morning. 90 tablet 3    spironolactone (ALDACTONE) 25 MG tablet Take 1  tablet (25 mg total) by mouth once daily. 90 tablet 3    tacrolimus (PROGRAF) 1 MG Cap Take 2 capsules (2 mg total) by mouth every 12 (twelve) hours. TAKE 2 CAPSULES BY MOUTH EVERY MORNING AND 2 CAPSULES EVERY EVENING.  Z94.0 kidney transplant 360 capsule 3    triamcinolone acetonide 0.1% (KENALOG) 0.1 % cream AAA bid to rash on ankles (Patient not taking: Reported on 4/13/2020) 60 g 3    vitamin D 1000 units Tab Take 370 mg by mouth 2 (two) times daily. 2 tablets BID      warfarin (COUMADIN) 5 MG tablet Take 0.5-1 pill by mouth daily Or as directed by [facility]; #75 pills = 3-month supply 75 tablet 1     No current facility-administered medications for this visit.          Objective:   There were no vitals taken for this visit.body mass index is unknown because there is no height or weight on file.    Physical Exam  Unable to perform physical exam secondary to being a video visit       Labs:  Lab Results   Component Value Date    WBC 9.26 04/08/2020    HGB 10.6 (L) 04/08/2020    HCT 35.5 (L) 04/08/2020     04/08/2020    K 3.8 04/08/2020     04/08/2020    CO2 22 (L) 04/08/2020    BUN 30 (H) 04/08/2020    CREATININE 2.0 (H) 04/08/2020    EGFRNONAA 34.0 (A) 04/08/2020    GLUCOSE 121 (H) 04/12/2005    CALCIUM 7.1 (L) 04/08/2020    PHOS 4.2 04/08/2020    MG 2.2 04/08/2020    ALBUMIN 3.2 (L) 04/08/2020    ALBUMIN 3.2 (L) 04/08/2020    AST 25 04/08/2020    ALT 51 (H) 04/08/2020       No results found for: EXTANC, EXTWBC, EXTSEGS, EXTPLATELETS, EXTHEMOGLOBI, EXTHEMATOCRI, EXTCREATININ, EXTSODIUM, EXTPOTASSIUM, EXTBUN, EXTCO2, EXTCALCIUM, EXTPHOSPHORU, EXTGLUCOSE, EXTALBUMIN, EXTAST, EXTALT, EXTBILITOTAL, EXTLIPASE, EXTAMYLASE    No results found for: EXTCYCLOSLVL, EXTSIROLIMUS, EXTTACROLVL, EXTPROTCRE, EXTPTHINTACT, EXTPROTEINUA, EXTWBCUA, EXTRBCUA    Labs were reviewed with the patient.    Assessment/Plan:     1. H/O kidney transplant    2. Metabolic bone disease    3. Immunosuppressive management  encounter following kidney transplant    4. Anemia due to stage 3 chronic kidney disease    5. CKD (chronic kidney disease) stage 3, GFR 30-59 ml/min    6. Long term (current) use of anticoagulants [V58.61]        Mr. Phelps is a 64 y.o. male with:     # History of ESRD secondary to unclear etiology who was on dialysis started in ~Jan 1992 until receiving DDRT in July 1992 at [facility] which failed secondary to rejection and he resumed dialysis in April 1996 until receiving DDRT #2 (THYMO x3 induction, CMV D-/R+) on 4/12/05: baseline Cr 1.8  to 2.2  - last Cr within his more recent baseline at 2.0 from 4/8/20  - discussed with patient regarding how his baseline Cr has slightly increased over the years and that given his transplant was from April 2005 this is likely progression of CKD   - last UPC increased from 0.39 (7/1/19) --> 1.29 (4/8/20) - the rise in proteinuria could be related to CAN vs other etiology --> would arrange for repeat UA and UPC   - depending on repeat value will determine need for further work-up investigation vs optimization of CKD and BP control by his general nephrologist   - encouraged patient to drink at least 2L a day  - advised him to continue following with his general nephrologist    # Immunosuppression:   - continue Prograf 2 mg BID, last FK-506 level 4.0 from 4/8/20; goal trough 4-6  - continue  mg BID   - continue prednisone 5 mg daily   - will monitor closely for toxicities    # Infectious Surveillance:   - last CMV negative from 1/24/17  - last BK serum PCR negative from 2/2/19    # HTN: BP stable per patient   - continue current anti-hypertensive regimen - defer adjustments to his general nephrologist   - advised patient to check and record home BPs especially when he feels lightheaded  - low salt and healthy life discussed with the patient    # Metabolic Bone Disease/Secondary Hyperparathyroidism: last calcium low but phos normal   - continue calcium carbonate, zemplar, and  vitamin D - adjustment per general nephrologist   - will monitor PTH, calcium, and phosphorus as per our center protocol    # Anemia of chronic disease: Hb 10.6 from 4/8/20  - will continue monitoring as per our center guidelines. No indication for acute intervention today    # History of CAD s/p PCI to LAD in 2006  - continue ASA, beta blocker, statin     # A-fib:   - continue metoprolol   - continue K-DUR and calcium supplements to optimized electrolytes   - continue warfarin     # Healthcare Maintenance:   - advised patient to see a dermatologist annually given increased risk of skin cancers with immunosuppressive medications  - counseled patient regarding smoking cessation    Follow-up:   Annual follow-up with kidney transplant clinic with repeat labs, including renal function panel with UA, urine protein/creatinine ratio, and drug trough level q3 months.  Patient also reminded to follow-up with general nephrologist.    Layne Chairez MD       Education:   Material provided to the patient.  Patient reminded to call with any health changes since these can be early signs of significant complications.  Also, I advised the patient to be sure any new medications or changes of old medications are discussed with either a pharmacist or physician knowledgeable with transplant to avoid rejection/drug toxicity related to significant drug interactions.    UNOS Patient Status  Functional Status: 100% - Normal, no complaints, no evidence of disease  Physical Capacity: No Limitations

## 2020-04-15 NOTE — LETTER
April 15, 2020        Emre Latif  [address]  [address]LO[address]  [facility] [address]  Phone: [phone]  Fax: [phone]             [address]  [address]  [place] [address]  Phone: [phone]   Patient: Ibrahima Phelps   MR Number: [id number]   YOB: 1955   Date of Visit: 4/15/2020       Dear Dr. Emre Latif    Thank you for referring Ibrahima Phelps to me for evaluation. Attached you will find relevant portions of my assessment and plan of care.    If you have questions, please do not hesitate to call me. I look forward to following Ibrahima Phelps along with you.    Sincerely,    Layne Chairez MD    Enclosure    If you would like to receive this communication electronically, please contact [email or web address] or [phone] to request [employer] access.    [employer] is a tool which provides read-only access to select patient information with whom you have a relationship. Its easy to use and provides real time access to review your patients record including encounter summaries, notes, results, and demographic information.    If you feel you have received this communication in error or would no longer like to receive these types of communications, please e-mail [email or web address]

## 2020-04-16 ENCOUNTER — TELEPHONE (OUTPATIENT)
Dept: URGENT CARE | Facility: CLINIC | Age: 65
End: 2020-04-16

## 2020-04-16 ENCOUNTER — TELEPHONE (OUTPATIENT)
Dept: ORTHOPEDICS | Facility: CLINIC | Age: 65
End: 2020-04-16

## 2020-04-16 NOTE — TELEPHONE ENCOUNTER
Spoke to patient in regards to scheduling his referral appointment. Patient stated that he would like to cancel his appointment. He do not want to reschedule. Stated to patient that I will cancel the appointment. Stated thank you.

## 2020-04-16 NOTE — TELEPHONE ENCOUNTER
4/16/20 Spoke with patient regarding status of his right elbow bursitis. He realized today from Pharmacist that a Prednisone 4 mg pack had been prescribed. His symptoms are resolving and he needed to know if he should take these. He had been given a Celestone 6 mg injection and he is on a daily regiment of steroids 5 mg. I contacted Nicolette Kimball at [facility] who recommended NOT to begin the new pack. Apply cool compresses and an ace if needed. He may contact original provider Lianne Smalls NP on Saturday for any further questions.

## 2020-04-16 NOTE — PATIENT INSTRUCTIONS
1. Stop smoking   2. We will arrange for repeat urine studies   3. Continue working with Dr. Latif to optimize your chronic kidney disease management as well as blood pressure management   4. Practice good hand hygiene and social distancing   5. See a dermatologist annually when safe to do so  6. Check and records your blood pressures at home especially when you feel lightheaded and take the logs to your kidney doctor, PCP, or cardiologist

## 2020-04-18 NOTE — PROGRESS NOTES
Subjective:       Patient ID: Ibrahima Phelps is a 64 y.o. Black or  male who presents for follow-up evaluation of Chronic Kidney Disease and Kidney Transplant    HPI     He is getting over an URI, has not 'gone to his chest' but now feeling better. He continues to smoke a few cigarettes a day. Off PPI for several months now and without dyspepsia. No LE edema and no SOB.  He's noticed some allograft 'twinges' but no overt pain but no problems with urination. His wife is still working at [employer] as a manager of housing for [employer].     Interval history March 2019: he has three concerns:  1. Worsening tremors. Spilling corn and trouble with writing  2. Increased nocturia  3. Cold intolerance    He denies recent illness. No allogarft pain and no LUTS other than nocturia. He has unintentionally lost weight. No new medications. He is doing well off PPI. He is smoking 3 cigarettes to 3/4ppd depending on the day.     Interval history July 2019: he was hospitalized for CP and was found to be in SVT, his potassium was low in ER. He was CP free in ER after X 1 SL NTG and after SVT broke. Since then he feels well. He has lost a little bit of weight. Still smoking     Interval history Nov 2019: feels poorly--has URI and lost voice, no sick contacts. Kidney txp is managing Prograf--Cr is higher than baseline but Prograf is running high. No LUTS and no allograft pain.     Interval history April 2020:  The patient location is: Home  The chief complaint leading to consultation is: CKD and kidney transplant recioient  Visit type: audiovisual  Total time spent with patient:  28 minutes  Each patient to whom he or she provides medical services by telemedicine is:  (1) informed of the relationship between the physician and patient and the respective role of any other health care provider with respect to management of the patient; and (2) notified that he or she may decline to receive medical services by telemedicine and  may withdraw from such care at any time.    Notes: He is doing well. He stays home, his wife does the shopping and errands. He was admitted to the hospital in late March for chest pain, felt to be musculoskeletal. They started hydralazine for BP, lowered too much at home with symptoms and Cards advised to cut in half. No LE edema. No allograft pain and no LUTS. He still takes his calcium as instructed.       Review of Systems   Constitutional: Positive for unexpected weight change. Negative for activity change, appetite change, fatigue and fever.   HENT: Negative for congestion, facial swelling, postnasal drip and rhinorrhea.    Respiratory: Negative for cough and shortness of breath.    Cardiovascular: Negative for chest pain and leg swelling.   Gastrointestinal: Negative for abdominal pain (allograft 'twinges').   Endocrine: Positive for cold intolerance.   Genitourinary: Positive for frequency. Negative for decreased urine volume, difficulty urinating, dysuria and hematuria.   Musculoskeletal: Positive for arthralgias and back pain.   Skin: Negative for rash.   Neurological: Positive for tremors. Negative for weakness and headaches.   Hematological: Does not bruise/bleed easily.   Psychiatric/Behavioral: Negative for decreased concentration and sleep disturbance.       Objective:      Physical Exam   Constitutional: He is oriented to person, place, and time. He appears well-nourished. No distress.   HENT:   Mouth/Throat: Oropharynx is clear and moist.   Eyes: No scleral icterus.   Pulmonary/Chest: No respiratory distress.   Neurological: He is alert and oriented to person, place, and time.   Psychiatric: He has a normal mood and affect.   Nursing note and vitals reviewed.      Assessment:       1. CKD (chronic kidney disease) stage 3, GFR 30-59 ml/min    2. H/O kidney transplant    3. Immunosuppressive management encounter following kidney transplant    4. Type 2 diabetes mellitus without complication, without  long-term current use of insulin    5. 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 Kidney transplant recipient    7. Paroxysmal atrial fibrillation    8. Heart failure with preserved ejection fraction, unspecified HF chronicity    9. Mixed hyperlipidemia    10. Secondary renal hyperparathyroidism    11. Coronary artery disease involving native coronary artery of native heart without angina pectoris    12. History of pericarditis    13. Anemia due to stage 3 chronic kidney disease    14. Chronic heart failure with preserved ejection fraction    15. Prediabetes    16. Metabolic bone disease    17. Vitamin D deficiency    18. Tobacco use    19. Other iron deficiency anemia    20. Immunodeficiency due to treatment with immunosuppressive medication    21. Other emphysema    22. Obesity (BMI 30-39.9)    23. Diverticulosis of intestine without bleeding, unspecified intestinal tract location    24. Essential hypertension    25. Hyperglycemia    26. Rash    27. Long term (current) use of anticoagulants [V58.61]    28. Anemia in stage 3 chronic kidney disease        Plan:             ESRD s/p kidney transplsant with resultant CKD. Stable allograft function. Tac level at goal (4-6)    CKD with subnephrotic proteinuria--increased level, may be due to increased BP. Will have him recheck when 'safe'     HTN is controlled, continue to monitor on addition of hydralazine    DM2 post transplant, steroid induced/PreDiabetes--stable    Mineral and Bone Disease--continue current treatment with calcium supplements, Zemplar and D3    Back pain--no NSAIDs, OK for gabapentin    Tobacco use--counseled          RTC 4 months with labs prior

## 2020-04-21 ENCOUNTER — PATIENT MESSAGE (OUTPATIENT)
Dept: INTERNAL MEDICINE | Facility: CLINIC | Age: 65
End: 2020-04-21

## 2020-04-21 DIAGNOSIS — E87.6 HYPOKALEMIA: ICD-10-CM

## 2020-04-22 ENCOUNTER — TELEPHONE (OUTPATIENT)
Dept: CARDIOLOGY | Facility: CLINIC | Age: 65
End: 2020-04-22

## 2020-04-22 ENCOUNTER — LAB VISIT (OUTPATIENT)
Dept: LAB | Facility: HOSPITAL | Age: 65
End: 2020-04-22
Attending: INTERNAL MEDICINE
Payer: MEDICARE

## 2020-04-22 ENCOUNTER — ANTI-COAG VISIT (OUTPATIENT)
Dept: CARDIOLOGY | Facility: CLINIC | Age: 65
End: 2020-04-22
Payer: MEDICARE

## 2020-04-22 DIAGNOSIS — I50.32 CHRONIC DIASTOLIC HEART FAILURE: ICD-10-CM

## 2020-04-22 DIAGNOSIS — Z79.01 LONG TERM (CURRENT) USE OF ANTICOAGULANTS: ICD-10-CM

## 2020-04-22 DIAGNOSIS — I48.91 ATRIAL FIBRILLATION WITH RAPID VENTRICULAR RESPONSE: ICD-10-CM

## 2020-04-22 DIAGNOSIS — I47.10 SUPRAVENTRICULAR TACHYCARDIA: ICD-10-CM

## 2020-04-22 LAB
ANION GAP SERPL CALC-SCNC: 12 MMOL/L (ref 8–16)
BUN SERPL-MCNC: 33 MG/DL (ref 8–23)
CALCIUM SERPL-MCNC: 8.3 MG/DL (ref 8.7–10.5)
CHLORIDE SERPL-SCNC: 108 MMOL/L (ref 95–110)
CO2 SERPL-SCNC: 21 MMOL/L (ref 23–29)
CREAT SERPL-MCNC: 2.4 MG/DL (ref 0.5–1.4)
EST. GFR  (AFRICAN AMERICAN): 31.5 ML/MIN/1.73 M^2
EST. GFR  (NON AFRICAN AMERICAN): 27.3 ML/MIN/1.73 M^2
GLUCOSE SERPL-MCNC: 112 MG/DL (ref 70–110)
INR PPP: 2.4 (ref 0.8–1.2)
POTASSIUM SERPL-SCNC: 3.7 MMOL/L (ref 3.5–5.1)
PROTHROMBIN TIME: 22.5 SEC (ref 9–12.5)
SODIUM SERPL-SCNC: 141 MMOL/L (ref 136–145)

## 2020-04-22 PROCEDURE — 80048 BASIC METABOLIC PNL TOTAL CA: CPT | Mod: HCNC

## 2020-04-22 PROCEDURE — 85610 PROTHROMBIN TIME: CPT | Mod: HCNC

## 2020-04-22 PROCEDURE — 36415 COLL VENOUS BLD VENIPUNCTURE: CPT | Mod: HCNC

## 2020-04-22 PROCEDURE — 93793 ANTICOAG MGMT PT WARFARIN: CPT | Mod: S$GLB,,, | Performed by: PHARMACIST

## 2020-04-22 PROCEDURE — 93793 PR ANTICOAGULANT MGMT FOR PT TAKING WARFARIN: ICD-10-PCS | Mod: S$GLB,,, | Performed by: PHARMACIST

## 2020-04-22 RX ORDER — POTASSIUM CHLORIDE 750 MG/1
TABLET, FILM COATED, EXTENDED RELEASE ORAL
Qty: 90 TABLET | Refills: 3 | Status: SHIPPED | OUTPATIENT
Start: 2020-04-22 | End: 2021-03-17 | Stop reason: SDUPTHER

## 2020-04-22 NOTE — TELEPHONE ENCOUNTER
----- Message from Shagufta Blunt MD sent at 4/22/2020  8:28 AM CDT -----  Creatinine mildly increased on lasix 40 mg bid dose. Recommend decreasing back down to 40 mg once daily and taking a second dose for weight gain > 3 lbs in 24 hours or 5 lbs in one week.

## 2020-05-13 ENCOUNTER — LAB VISIT (OUTPATIENT)
Dept: LAB | Facility: HOSPITAL | Age: 65
End: 2020-05-13
Attending: INTERNAL MEDICINE
Payer: MEDICARE

## 2020-05-13 ENCOUNTER — ANTI-COAG VISIT (OUTPATIENT)
Dept: CARDIOLOGY | Facility: CLINIC | Age: 65
End: 2020-05-13
Payer: MEDICARE

## 2020-05-13 DIAGNOSIS — Z79.01 LONG TERM (CURRENT) USE OF ANTICOAGULANTS: ICD-10-CM

## 2020-05-13 DIAGNOSIS — I48.91 ATRIAL FIBRILLATION WITH RAPID VENTRICULAR RESPONSE: ICD-10-CM

## 2020-05-13 DIAGNOSIS — I47.10 SUPRAVENTRICULAR TACHYCARDIA: ICD-10-CM

## 2020-05-13 LAB
INR PPP: 1.9 (ref 0.8–1.2)
PROTHROMBIN TIME: 18.7 SEC (ref 9–12.5)

## 2020-05-13 PROCEDURE — 93793 PR ANTICOAGULANT MGMT FOR PT TAKING WARFARIN: ICD-10-PCS | Mod: S$GLB,,, | Performed by: PHARMACIST

## 2020-05-13 PROCEDURE — 36415 COLL VENOUS BLD VENIPUNCTURE: CPT | Mod: HCNC

## 2020-05-13 PROCEDURE — 93793 ANTICOAG MGMT PT WARFARIN: CPT | Mod: S$GLB,,, | Performed by: PHARMACIST

## 2020-05-13 PROCEDURE — 85610 PROTHROMBIN TIME: CPT | Mod: HCNC

## 2020-05-18 ENCOUNTER — PATIENT MESSAGE (OUTPATIENT)
Dept: NEPHROLOGY | Facility: CLINIC | Age: 65
End: 2020-05-18

## 2020-05-19 RX ORDER — PARICALCITOL 1 UG/1
CAPSULE, LIQUID FILLED ORAL
Qty: 90 CAPSULE | Refills: 3 | Status: SHIPPED | OUTPATIENT
Start: 2020-05-19 | End: 2021-07-03

## 2020-06-03 ENCOUNTER — LAB VISIT (OUTPATIENT)
Dept: LAB | Facility: HOSPITAL | Age: 65
End: 2020-06-03
Attending: INTERNAL MEDICINE
Payer: MEDICARE

## 2020-06-03 ENCOUNTER — ANTI-COAG VISIT (OUTPATIENT)
Dept: CARDIOLOGY | Facility: CLINIC | Age: 65
End: 2020-06-03
Payer: MEDICARE

## 2020-06-03 DIAGNOSIS — I48.91 ATRIAL FIBRILLATION WITH RAPID VENTRICULAR RESPONSE: ICD-10-CM

## 2020-06-03 DIAGNOSIS — I47.10 SUPRAVENTRICULAR TACHYCARDIA: ICD-10-CM

## 2020-06-03 DIAGNOSIS — Z79.01 LONG TERM (CURRENT) USE OF ANTICOAGULANTS: ICD-10-CM

## 2020-06-03 LAB
INR PPP: 1.4 (ref 0.8–1.2)
PROTHROMBIN TIME: 13.8 SEC (ref 9–12.5)

## 2020-06-03 PROCEDURE — 93793 PR ANTICOAGULANT MGMT FOR PT TAKING WARFARIN: ICD-10-PCS | Mod: S$GLB,,, | Performed by: PHARMACIST

## 2020-06-03 PROCEDURE — 85610 PROTHROMBIN TIME: CPT | Mod: HCNC

## 2020-06-03 PROCEDURE — 36415 COLL VENOUS BLD VENIPUNCTURE: CPT | Mod: HCNC

## 2020-06-03 PROCEDURE — 93793 ANTICOAG MGMT PT WARFARIN: CPT | Mod: S$GLB,,, | Performed by: PHARMACIST

## 2020-06-09 ENCOUNTER — PATIENT OUTREACH (OUTPATIENT)
Dept: ADMINISTRATIVE | Facility: OTHER | Age: 65
End: 2020-06-09

## 2020-06-09 ENCOUNTER — OFFICE VISIT (OUTPATIENT)
Dept: INTERNAL MEDICINE | Facility: CLINIC | Age: 65
End: 2020-06-09
Payer: MEDICARE

## 2020-06-09 VITALS
SYSTOLIC BLOOD PRESSURE: 130 MMHG | HEART RATE: 59 BPM | OXYGEN SATURATION: 99 % | DIASTOLIC BLOOD PRESSURE: 68 MMHG | HEIGHT: 73 IN | BODY MASS INDEX: 29.31 KG/M2 | WEIGHT: 221.13 LBS

## 2020-06-09 DIAGNOSIS — Z79.899 IMMUNOSUPPRESSIVE MANAGEMENT ENCOUNTER FOLLOWING KIDNEY TRANSPLANT: ICD-10-CM

## 2020-06-09 DIAGNOSIS — R79.9 ABNORMAL FINDING OF BLOOD CHEMISTRY, UNSPECIFIED: ICD-10-CM

## 2020-06-09 DIAGNOSIS — D63.1 ANEMIA DUE TO STAGE 3 CHRONIC KIDNEY DISEASE: ICD-10-CM

## 2020-06-09 DIAGNOSIS — R63.4 WEIGHT LOSS: ICD-10-CM

## 2020-06-09 DIAGNOSIS — N25.81 SECONDARY RENAL HYPERPARATHYROIDISM: ICD-10-CM

## 2020-06-09 DIAGNOSIS — R73.03 PREDIABETES: ICD-10-CM

## 2020-06-09 DIAGNOSIS — N18.30 CKD (CHRONIC KIDNEY DISEASE) STAGE 3, GFR 30-59 ML/MIN: ICD-10-CM

## 2020-06-09 DIAGNOSIS — Z79.899 IMMUNODEFICIENCY DUE TO TREATMENT WITH IMMUNOSUPPRESSIVE MEDICATION: ICD-10-CM

## 2020-06-09 DIAGNOSIS — D84.821 IMMUNODEFICIENCY DUE TO TREATMENT WITH IMMUNOSUPPRESSIVE MEDICATION: ICD-10-CM

## 2020-06-09 DIAGNOSIS — Z79.01 LONG TERM (CURRENT) USE OF ANTICOAGULANTS: ICD-10-CM

## 2020-06-09 DIAGNOSIS — I50.32 CHRONIC HEART FAILURE WITH PRESERVED EJECTION FRACTION: ICD-10-CM

## 2020-06-09 DIAGNOSIS — I13.0 HYPERTENSIVE HEART AND RENAL DISEASE WITH RENAL FAILURE, STAGE 1 THROUGH STAGE 4 OR UNSPECIFIED CHRONIC KIDNEY DISEASE, WITH HEART FAILURE: Primary | ICD-10-CM

## 2020-06-09 DIAGNOSIS — Z13.220 LIPID SCREENING: ICD-10-CM

## 2020-06-09 DIAGNOSIS — J43.8 OTHER EMPHYSEMA: ICD-10-CM

## 2020-06-09 DIAGNOSIS — R91.8 MULTIPLE LUNG NODULES ON CT: ICD-10-CM

## 2020-06-09 DIAGNOSIS — I48.0 PAROXYSMAL ATRIAL FIBRILLATION: ICD-10-CM

## 2020-06-09 DIAGNOSIS — Z94.0 IMMUNOSUPPRESSIVE MANAGEMENT ENCOUNTER FOLLOWING KIDNEY TRANSPLANT: ICD-10-CM

## 2020-06-09 DIAGNOSIS — E78.2 MIXED HYPERLIPIDEMIA: ICD-10-CM

## 2020-06-09 DIAGNOSIS — R53.83 FATIGUE, UNSPECIFIED TYPE: ICD-10-CM

## 2020-06-09 DIAGNOSIS — N18.30 ANEMIA DUE TO STAGE 3 CHRONIC KIDNEY DISEASE: ICD-10-CM

## 2020-06-09 DIAGNOSIS — Z86.010 HISTORY OF ADENOMATOUS POLYP OF COLON: ICD-10-CM

## 2020-06-09 DIAGNOSIS — Z72.0 TOBACCO USE: ICD-10-CM

## 2020-06-09 DIAGNOSIS — Z94.0 H/O KIDNEY TRANSPLANT: ICD-10-CM

## 2020-06-09 PROCEDURE — 3075F PR MOST RECENT SYSTOLIC BLOOD PRESS GE 130-139MM HG: ICD-10-PCS | Mod: HCNC,CPTII,S$GLB, | Performed by: INTERNAL MEDICINE

## 2020-06-09 PROCEDURE — 99999 PR PBB SHADOW E&M-EST. PATIENT-LVL III: CPT | Mod: PBBFAC,HCNC,, | Performed by: INTERNAL MEDICINE

## 2020-06-09 PROCEDURE — 99999 PR PBB SHADOW E&M-EST. PATIENT-LVL III: ICD-10-PCS | Mod: PBBFAC,HCNC,, | Performed by: INTERNAL MEDICINE

## 2020-06-09 PROCEDURE — 1101F PR PT FALLS ASSESS DOC 0-1 FALLS W/OUT INJ PAST YR: ICD-10-PCS | Mod: HCNC,CPTII,S$GLB, | Performed by: INTERNAL MEDICINE

## 2020-06-09 PROCEDURE — 99214 PR OFFICE/OUTPT VISIT, EST, LEVL IV, 30-39 MIN: ICD-10-PCS | Mod: HCNC,S$GLB,, | Performed by: INTERNAL MEDICINE

## 2020-06-09 PROCEDURE — 3008F PR BODY MASS INDEX (BMI) DOCUMENTED: ICD-10-PCS | Mod: HCNC,CPTII,S$GLB, | Performed by: INTERNAL MEDICINE

## 2020-06-09 PROCEDURE — 1101F PT FALLS ASSESS-DOCD LE1/YR: CPT | Mod: HCNC,CPTII,S$GLB, | Performed by: INTERNAL MEDICINE

## 2020-06-09 PROCEDURE — 3078F PR MOST RECENT DIASTOLIC BLOOD PRESSURE < 80 MM HG: ICD-10-PCS | Mod: HCNC,CPTII,S$GLB, | Performed by: INTERNAL MEDICINE

## 2020-06-09 PROCEDURE — 99214 OFFICE O/P EST MOD 30 MIN: CPT | Mod: HCNC,S$GLB,, | Performed by: INTERNAL MEDICINE

## 2020-06-09 PROCEDURE — 3078F DIAST BP <80 MM HG: CPT | Mod: HCNC,CPTII,S$GLB, | Performed by: INTERNAL MEDICINE

## 2020-06-09 PROCEDURE — 3075F SYST BP GE 130 - 139MM HG: CPT | Mod: HCNC,CPTII,S$GLB, | Performed by: INTERNAL MEDICINE

## 2020-06-09 PROCEDURE — 3008F BODY MASS INDEX DOCD: CPT | Mod: HCNC,CPTII,S$GLB, | Performed by: INTERNAL MEDICINE

## 2020-06-09 NOTE — PROGRESS NOTES
Chart reviewed.   Immunizations: updated  Orders placed: n/a  Upcoming appts to satisfy MARTINEZ topics: Hemoglobin A1c 6/17

## 2020-06-09 NOTE — PROGRESS NOTES
INTERNAL MEDICINE ESTABLISHED PATIENT VISIT NOTE    Subjective:     Chief Complaint: Follow-up  HTN, CKD     Patient ID: Ibrahima Phelps is a 64 y.o. male with HTN c CKD3 and hx renal transplant x2 in 2002 and 2005 and on immunosuppressive meds and secondary hyperPTH, HLD, A fib, hx SVT, CAD c chronic HF c preserved EF, COPD c baseline DAUGHERTY and tob use, thrombocytopenia now resolved, anemia of chronic disease, preDM, GERD, lung nodule on CT 2/2020 c plan to repeat screening CT in one year, last seen by me in Feb, here today for f/u.    At last appt c/o abd pain (see last note for details) so was seen by GI and scheduled for csc but apparently had poor prep so was told he needed to schedule repeat at next available but has not done this yet.  States he currently does not have any abd pain.    Was also admitted overnight in March for chest pain.  Had some subtle EKG findings on one of his EKGs while inpt (ST dep V5 and V6 per discharge summary) and echo c small trace pericardial effusion.  Was apparently diuresed and lasix dose increased to 40 bid at his virtual visit c Dr. Blunt in Feb but states they have since decreased the dose to half a tablet bid.    At that discharge, was also started on Hydralazine to optimize BP control but states the dose had to be decreased due to low bp and weakness (currently taking 25 mg bid, although rx says TID).    Currently c c/o feeling tired intermittently over the past month and a half.  Also feels cold at times which pt states is only over his L chest and L arm but again is intermittent.    Denies LE edema or sob.  Denies cp currently.    Past Medical History:  Past Medical History:   Diagnosis Date    (HFpEF) heart failure with preserved ejection fraction 9/25/2017    Atrial fibrillation     CAD (coronary artery disease)     Chronic diastolic heart failure 4/8/2020    CKD (chronic kidney disease) stage 3, GFR 30-59 ml/min     Dr. Latif    Diverticulosis     Fever blister      Heart attack 2006    Hyperlipidemia     Hypertension     Immunodeficiency due to treatment with immunosuppressive medication     Keloid cicatrix     Metabolic bone disease     Pericarditis     S/P kidney transplant     Supraventricular tachycardia 06/2019    Thrombocytopenia     Thyroid disease     Unspecified disorder of kidney and ureter     Stage IIICKD       Home Medications:  Prior to Admission medications    Medication Sig Start Date End Date Taking? Authorizing Provider   albuterol (ACCUNEB) 1.25 mg/3 mL Nebu Take 3 mLs (1.25 mg total) by nebulization every 6 (six) hours as needed. Rescue 10/1/19 9/30/20 Yes Connie Magdaleno MD   aspirin (ADULT ASPIRIN EC LOW STRENGTH) 81 MG EC tablet Take 1 tablet by mouth Daily. 6/11/12  Yes Historical Provider, MD   atorvastatin (LIPITOR) 10 MG tablet Take 1 tablet (10 mg total) by mouth once daily. 1/21/20  Yes Shagufta Blunt MD   calcium carbonate (OS-TRISH) 500 mg calcium (1,250 mg) tablet Two by mouth twice a day 1/21/20  Yes Emre Latif MD   carvediloL (COREG) 25 MG tablet Take 1 tablet (25 mg total) by mouth 2 (two) times daily with meals. 2/3/20 2/2/21 Yes Connie Magdaleno MD   colchicine (COLCRYS) 0.6 mg tablet One po BID up to three days prn gout flare 11/22/19  Yes Emre Latif MD   doxazosin (CARDURA) 4 MG tablet Take 1 tablet (4 mg total) by mouth every 12 (twelve) hours. 1/21/20  Yes Emre Latif MD   famotidine (PEPCID) 20 MG tablet Take 1 tablet (20 mg total) by mouth 2 (two) times daily. 4/14/20 4/14/21 Yes Emre Latif MD   fexofenadine (ALLEGRA) 60 MG tablet Take 1 tablet by mouth Twice daily as needed. 6/11/12  Yes Historical Provider, MD   fluticasone propionate (FLONASE) 50 mcg/actuation nasal spray 1 spray (50 mcg total) by Each Nostril route once daily. 7/26/19  Yes Trish Worthy PA-C   furosemide (LASIX) 40 MG tablet Take 0.5 tablets (20 mg total) by mouth 2 (two) times daily. 1/21/20  Yes Emre Latif,  MD   gabapentin (NEURONTIN) 300 MG capsule Take 1 capsule (300 mg total) by mouth 2 (two) times daily. 2/3/20  Yes Connie Magdaleno MD   INV hydrALAZINE (APRESOLINE) 25 MG Tab Take 1 tablet (25 mg total) by mouth 3 (three) times daily. 5/19/20 5/19/21 Yes Emre Latif MD   KLOR-CON 10 10 mEq TbSR TAKE 1 TABLET (10 MEQ TOTAL) BY MOUTH ONCE DAILY. 4/22/20  Yes Kerri Norwood MD   multivitamin (THERAGRAN) per tablet Take 1 tablet by mouth Daily. 6/11/12  Yes Rob Agudelo MD   mycophenolate (CELLCEPT) 250 mg Cap Take 2 capsules (500 mg total) by mouth 2 (two) times daily. 1/28/20 1/22/21 Yes Emre Latif MD   nicotine (NICODERM CQ) 21 mg/24 hr Place 1 patch onto the skin once daily. 7/8/19  Yes Emre Latif MD   NIFEdipine (PROCARDIA-XL) 90 MG (OSM) 24 hr tablet Take 1 tablet (90 mg total) by mouth once daily. 1/21/20 1/20/21 Yes Shagufta Blunt MD   paricalcitoL (ZEMPLAR) 1 MCG capsule One po MWF 5/19/20  Yes Emre Latif MD   predniSONE (DELTASONE) 5 MG tablet Take 1 tablet (5 mg total) by mouth every morning. 1/21/20  Yes Emre Latif MD   spironolactone (ALDACTONE) 25 MG tablet Take 1 tablet (25 mg total) by mouth once daily. 1/21/20  Yes Emre Latif MD   tacrolimus (PROGRAF) 1 MG Cap Take 2 capsules (2 mg total) by mouth every 12 (twelve) hours. TAKE 2 CAPSULES BY MOUTH EVERY MORNING AND 2 CAPSULES EVERY EVENING.  Z94.0 kidney transplant 1/28/20 1/27/21 Yes Emre Latif MD   triamcinolone acetonide 0.1% (KENALOG) 0.1 % cream AAA bid to rash on ankles 2/26/19  Yes Lyric Jovel MD   vitamin D 1000 units Tab Take 370 mg by mouth 2 (two) times daily. 2 tablets BID   Yes Historical Provider, MD   warfarin (COUMADIN) 5 MG tablet Take 0.5-1 pill by mouth daily Or as directed by [facility]; #75 pills = 3-month supply 1/21/20  Yes Shagufta Blunt MD       Allergies:  Review of patient's allergies indicates:   Allergen Reactions    Ace inhibitors  "Swelling    Verapamil Other (See Comments)     Other reaction(s): Unknown    Povidone-iodine Itching       Social History:  Social History     Tobacco Use    Smoking status: Current Some Day Smoker     Packs/day: 0.50     Years: 40.00     Pack years: 20.00     Types: Cigarettes    Smokeless tobacco: Never Used    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   Substance Use Topics    Alcohol use: No    Drug use: No        Review of Systems   Constitutional: Negative for activity change and unexpected weight change.   HENT: Negative for hearing loss, rhinorrhea and trouble swallowing.    Eyes: Negative for discharge and visual disturbance.   Respiratory: Negative for chest tightness.    Cardiovascular: Negative for chest pain.   Gastrointestinal: Negative for blood in stool, constipation, diarrhea and vomiting.   Endocrine: Negative for polydipsia and polyuria.   Genitourinary: Positive for urgency. Negative for difficulty urinating and hematuria.   Musculoskeletal: Positive for joint swelling and neck pain. Negative for arthralgias.   Neurological: Negative for weakness and headaches.   Psychiatric/Behavioral: Negative for confusion and dysphoric mood.         Health Maintenance:     Immunizations:   Influenza declined by pt, Risks and benefits were discussed with patient.  TDap is up to date 3/2018  Pneumovax 12/2016, Prevnar 13 2/2017, pvax booster 2/2020  Shingrix rec today but declined by pt, can reassess at f/u     Cancer Screening:  Colonoscopy: is up to date. 9/2014, polyps, hyperplastic and tubular adenoma, rec f/u in 5 yrs, repeat csc attempted 3/2020 but had poor prep, advised to reschedule.  Can discuss AAA screen at f/u    Objective:   /68 (BP Location: Right arm, Patient Position: Sitting, BP Method: Large (Manual))   Pulse (!) 59   Ht 6' 1" (1.854 m)   Wt 100.3 kg (221 lb 1.9 oz)   SpO2 99%   BMI 29.17 kg/m²        General: AAO x3, no " apparent distress  HEENT: PERRL, OP clear  CV: RRR, no m/r/g  Pulm: Lungs CTAB, no crackles, no wheezes  Abd: s/NT/ND +BS  Extremities: no c/c/e    Labs:     Lab Results   Component Value Date    WBC 9.26 04/08/2020    HGB 10.6 (L) 04/08/2020    HCT 35.5 (L) 04/08/2020    MCV 82 04/08/2020     04/08/2020     Sodium   Date Value Ref Range Status   04/22/2020 141 136 - 145 mmol/L Final     Potassium   Date Value Ref Range Status   04/22/2020 3.7 3.5 - 5.1 mmol/L Final     Chloride   Date Value Ref Range Status   04/22/2020 108 95 - 110 mmol/L Final     CO2   Date Value Ref Range Status   04/22/2020 21 (L) 23 - 29 mmol/L Final     Glucose   Date Value Ref Range Status   04/22/2020 112 (H) 70 - 110 mg/dL Final     BUN, Bld   Date Value Ref Range Status   04/22/2020 33 (H) 8 - 23 mg/dL Final     Creatinine   Date Value Ref Range Status   04/22/2020 2.4 (H) 0.5 - 1.4 mg/dL Final     Calcium   Date Value Ref Range Status   04/22/2020 8.3 (L) 8.7 - 10.5 mg/dL Final     Total Protein   Date Value Ref Range Status   04/08/2020 6.7 6.0 - 8.4 g/dL Final     Albumin   Date Value Ref Range Status   04/08/2020 3.2 (L) 3.5 - 5.2 g/dL Final   04/08/2020 3.2 (L) 3.5 - 5.2 g/dL Final     Total Bilirubin   Date Value Ref Range Status   04/08/2020 0.3 0.1 - 1.0 mg/dL Final     Comment:     For infants and newborns, interpretation of results should be based  on gestational age, weight and in agreement with clinical  observations.  Premature Infant recommended reference ranges:  Up to 24 hours.............<8.0 mg/dL  Up to 48 hours............<12.0 mg/dL  3-5 days..................<15.0 mg/dL  6-29 days.................<15.0 mg/dL       Alkaline Phosphatase   Date Value Ref Range Status   04/08/2020 72 55 - 135 U/L Final     AST   Date Value Ref Range Status   04/08/2020 25 10 - 40 U/L Final     ALT   Date Value Ref Range Status   04/08/2020 51 (H) 10 - 44 U/L Final     Anion Gap   Date Value Ref Range Status   04/22/2020 12 8 - 16  mmol/L Final     eGFR if    Date Value Ref Range Status   04/22/2020 31.5 (A) >60 mL/min/1.73 m^2 Final     eGFR if non    Date Value Ref Range Status   04/22/2020 27.3 (A) >60 mL/min/1.73 m^2 Final     Comment:     Calculation used to obtain the estimated glomerular filtration  rate (eGFR) is the CKD-EPI equation.        Lab Results   Component Value Date    HGBA1C 5.9 (H) 11/15/2019     Lab Results   Component Value Date    LDLCALC 69.4 04/12/2019     Lab Results   Component Value Date    TSH 1.434 02/02/2019         Assessment/Plan     Ibrahima was seen today for follow-up.    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  -     Comprehensive metabolic panel; Future    Chronic heart failure with preserved ejection fraction  Currently euvolemic, lasix mgmt as per Dr. Blunt    Paroxysmal atrial fibrillation  Long term (current) use of anticoagulants [V58.61]  Sinus loan today, on coumadin as per Cards, no acute issues.      Anemia due to stage 3 chronic kidney disease  Last two checks slightly lower than baseline, will repeat now c repeat iron levels.  Lab Results   Component Value Date    WBC 9.26 04/08/2020    HGB 10.6 (L) 04/08/2020    HCT 35.5 (L) 04/08/2020    MCV 82 04/08/2020     04/08/2020       Lab Results   Component Value Date    IRON 66 03/11/2019    TIBC 250 03/11/2019    FERRITIN 822 (H) 03/11/2019     -     CBC auto differential; Future  -     Iron and TIBC; Future  -     Ferritin; Future    Prediabetes  Lab Results    Component Value Date    HGBA1C 5.9 (H) 11/15/2019     Close to baseline on last check in Nov, will repeat now.  -     Hemoglobin A1C; Future  -     TSH; Future    Fatigue, unspecified type  Unclear etiology, repeat basic labs c TSH and A1c  Also snores at night; if initial w/u unrevealing would consider sleep study.  meds also reviewed, gabapentin could be contributing but states he only takes it at bedtime and gets a sufficient amount of sleep each night.    Other emphysema  Baseline mild cough  CT scan done c nodules that did not look concerning, will monitor    Mixed hyperlipidemia  Stable on lipitor, cont meds  Lab Results   Component Value Date    LDLCALC 69.4 04/12/2019       History of adenomatous polyp of colon  Due for f/u csc due to recent poor prep, pt advised to reschedule.    H/O kidney transplant  Immunodeficiency due to treatment with immunosuppressive medication  Immunosuppressive management encounter following kidney transplant  Secondary renal hyperparathyroidism  Cont f/u in Transplant clinic as well as c nephrology, cr as above, repeating today.    Tobacco use  Patient counseled on the need to stop smoking.    Weight loss  Fluctuating, w/u unrevealing thus far and has been fluctuating, will monitor.    Multiple lung nodules on CT  Does not appear concerning per report, plan for f/u screen in one year.      HM as above  RTC in 4 mos, sooner if needed.    Connie Magdaleno MD  Department of Internal Medicine - [facility]  06/09/2020

## 2020-06-11 ENCOUNTER — OFFICE VISIT (OUTPATIENT)
Dept: CARDIOLOGY | Facility: CLINIC | Age: 65
End: 2020-06-11
Payer: MEDICARE

## 2020-06-11 VITALS
OXYGEN SATURATION: 99 % | SYSTOLIC BLOOD PRESSURE: 145 MMHG | BODY MASS INDEX: 29.69 KG/M2 | DIASTOLIC BLOOD PRESSURE: 70 MMHG | HEIGHT: 73 IN | HEART RATE: 59 BPM | WEIGHT: 224 LBS

## 2020-06-11 DIAGNOSIS — I25.10 CORONARY ARTERY DISEASE INVOLVING NATIVE CORONARY ARTERY OF NATIVE HEART WITHOUT ANGINA PECTORIS: ICD-10-CM

## 2020-06-11 DIAGNOSIS — N18.30 CKD (CHRONIC KIDNEY DISEASE) STAGE 3, GFR 30-59 ML/MIN: ICD-10-CM

## 2020-06-11 DIAGNOSIS — I50.32 CHRONIC DIASTOLIC HEART FAILURE: ICD-10-CM

## 2020-06-11 DIAGNOSIS — Z94.0 H/O KIDNEY TRANSPLANT: ICD-10-CM

## 2020-06-11 DIAGNOSIS — E78.2 MIXED HYPERLIPIDEMIA: ICD-10-CM

## 2020-06-11 DIAGNOSIS — I48.0 PAROXYSMAL ATRIAL FIBRILLATION: ICD-10-CM

## 2020-06-11 DIAGNOSIS — R06.09 DOE (DYSPNEA ON EXERTION): ICD-10-CM

## 2020-06-11 DIAGNOSIS — I50.32 CHRONIC HEART FAILURE WITH PRESERVED EJECTION FRACTION: Primary | ICD-10-CM

## 2020-06-11 DIAGNOSIS — Z72.0 TOBACCO USE: ICD-10-CM

## 2020-06-11 PROBLEM — I16.1 HYPERTENSIVE EMERGENCY: Status: RESOLVED | Noted: 2020-03-23 | Resolved: 2020-06-11

## 2020-06-11 PROBLEM — I50.30 (HFPEF) HEART FAILURE WITH PRESERVED EJECTION FRACTION: Status: RESOLVED | Noted: 2017-09-25 | Resolved: 2020-06-11

## 2020-06-11 PROCEDURE — 3078F DIAST BP <80 MM HG: CPT | Mod: HCNC,CPTII,S$GLB, | Performed by: INTERNAL MEDICINE

## 2020-06-11 PROCEDURE — 99214 OFFICE O/P EST MOD 30 MIN: CPT | Mod: HCNC,S$GLB,, | Performed by: INTERNAL MEDICINE

## 2020-06-11 PROCEDURE — 99999 PR PBB SHADOW E&M-EST. PATIENT-LVL IV: ICD-10-PCS | Mod: PBBFAC,HCNC,, | Performed by: INTERNAL MEDICINE

## 2020-06-11 PROCEDURE — 3077F PR MOST RECENT SYSTOLIC BLOOD PRESSURE >= 140 MM HG: ICD-10-PCS | Mod: HCNC,CPTII,S$GLB, | Performed by: INTERNAL MEDICINE

## 2020-06-11 PROCEDURE — 3008F BODY MASS INDEX DOCD: CPT | Mod: HCNC,CPTII,S$GLB, | Performed by: INTERNAL MEDICINE

## 2020-06-11 PROCEDURE — 1101F PT FALLS ASSESS-DOCD LE1/YR: CPT | Mod: HCNC,CPTII,S$GLB, | Performed by: INTERNAL MEDICINE

## 2020-06-11 PROCEDURE — 99999 PR PBB SHADOW E&M-EST. PATIENT-LVL IV: CPT | Mod: PBBFAC,HCNC,, | Performed by: INTERNAL MEDICINE

## 2020-06-11 PROCEDURE — 99214 PR OFFICE/OUTPT VISIT, EST, LEVL IV, 30-39 MIN: ICD-10-PCS | Mod: HCNC,S$GLB,, | Performed by: INTERNAL MEDICINE

## 2020-06-11 PROCEDURE — 1101F PR PT FALLS ASSESS DOC 0-1 FALLS W/OUT INJ PAST YR: ICD-10-PCS | Mod: HCNC,CPTII,S$GLB, | Performed by: INTERNAL MEDICINE

## 2020-06-11 PROCEDURE — 3078F PR MOST RECENT DIASTOLIC BLOOD PRESSURE < 80 MM HG: ICD-10-PCS | Mod: HCNC,CPTII,S$GLB, | Performed by: INTERNAL MEDICINE

## 2020-06-11 PROCEDURE — 3077F SYST BP >= 140 MM HG: CPT | Mod: HCNC,CPTII,S$GLB, | Performed by: INTERNAL MEDICINE

## 2020-06-11 PROCEDURE — 3008F PR BODY MASS INDEX (BMI) DOCUMENTED: ICD-10-PCS | Mod: HCNC,CPTII,S$GLB, | Performed by: INTERNAL MEDICINE

## 2020-06-11 NOTE — LETTER
June 11, 2020      Aniya Carvajal MD  [address] [facility]  8th [address]  [facility] [address]           [facility]  [address]  [place] [address]  Phone: [phone]          Patient: Ibrahima Phelps   MR Number: [id number]   YOB: 1955   Date of Visit: 6/11/2020       Dear Dr. Aniya Carvajal:    Thank you for referring Ibrahima Phelps to me for evaluation. Attached you will find relevant portions of my assessment and plan of care.    If you have questions, please do not hesitate to call me. I look forward to following Ibrahima Phelps along with you.    Sincerely,    Shagufta Blunt MD    Enclosure  CC:  No Recipients    If you would like to receive this communication electronically, please contact [email or web address] or [phone] to request more information on [employer] Link access.    For providers and/or their staff who would like to refer a patient to [facility], please contact us through our one-stop-shop provider referral line, [facility], at [phone].    If you feel you have received this communication in error or would no longer like to receive these types of communications, please e-mail [email or web address]

## 2020-06-11 NOTE — PROGRESS NOTES
Subjective:   Patient ID:  Ibrahima Phelps is a 64 y.o. male who presents for follow-up of Hypertension      HPI: His chest discomfort has resolved. He does report worsening DAUGHERTY with his normal activities over the past few months. No PND or orthopnea. His weight has been stable. No improvement when we tried increasing his lasix dose. He is still smoking. He cut his hydralazine dose down to 25 mg bid due to symptomatic hypotension. He would like to change his coumadin to a DOAC. He has intermittent palpitations, mainly at night, but nothing sustained.  His last echo was 3/20 and showed his LVEF was 60%. Last spect was 6/19 and was negative for ischemia.    ECG 3/23/20): NSR with PAC and PVC's. LVH    Past Medical History:   Diagnosis Date    (HFpEF) heart failure with preserved ejection fraction 9/25/2017    Atrial fibrillation     CAD (coronary artery disease)     CHF (congestive heart failure)     Chronic diastolic heart failure 4/8/2020    CKD (chronic kidney disease) stage 3, GFR 30-59 ml/min     Dr. Latif    Diverticulosis     Fever blister     Heart attack 2006    Hyperlipidemia     Hypertension     Immunodeficiency due to treatment with immunosuppressive medication     Keloid cicatrix     Metabolic bone disease     Pericarditis     S/P kidney transplant     Supraventricular tachycardia 06/2019    Thrombocytopenia     Thyroid disease     Unspecified disorder of kidney and ureter     Stage IIICKD       Past Surgical History:   Procedure Laterality Date    CARDIOVERSION  04/30/15    CHOLECYSTECTOMY      COLONOSCOPY  April 20, 2011    Diverticulosis, repeat recommended in 3 yrs., repeat colonoscopy 2014 revealed 2 polyps.  He should return in 5 years.    COLONOSCOPY N/A 3/13/2020    Procedure: COLONOSCOPY;  Surgeon: Chon Casper MD;  Location: [facility] ([address] [place] [address]);  Service: Endoscopy;  Laterality: N/A;  ok to hold coumadin x5days-see telephone encounter 2/4/20-tb    CORONARY  ANGIOPLASTY WITH STENT PLACEMENT  9/13/2006    KIDNEY TRANSPLANT  2005    PARATHYROIDECTOMY         Social History     Socioeconomic History    Marital status:      Spouse name: Not on file    Number of children: Not on file    Years of education: Not on file    Highest education level: Not on file   Occupational History    Occupation:    Social Needs    Financial resource strain: Not on file    Food insecurity:     Worry: Not on file     Inability: Not on file    Transportation needs:     Medical: Not on file     Non-medical: Not on file   Tobacco Use    Smoking status: Current Some Day Smoker     Packs/day: 0.50     Years: 40.00     Pack years: 20.00     Types: Cigarettes    Smokeless tobacco: Never Used    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   Substance and Sexual Activity    Alcohol use: No    Drug use: No    Sexual activity: Yes     Partners: Female   Lifestyle    Physical activity:     Days per week: Not on file     Minutes per session: Not on file    Stress: Not on file   Relationships    Social connections:     Talks on phone: Not on file     Gets together: Not on file     Attends Sikh service: Not on file     Active member of club or organization: Not on file     Attends meetings of clubs or organizations: Not on file     Relationship status: Not on file   Other Topics Concern    Not on file   Social History Narrative    Retired        Family History   Problem Relation Age of Onset    No Known Problems Sister     No Known Problems Brother     Thyroid disease Mother         s/p surgery    Heart disease Father         had pacemaker    No Known Problems Sister     Kidney failure Sister     Kidney disease Sister     No Known Problems Sister     Kidney disease Brother     Kidney failure Brother         s/p transplant    Diabetes Mellitus Neg Hx     Stroke Neg Hx     Heart attack Neg Hx      Cancer Neg Hx     Celiac disease Neg Hx     Cirrhosis Neg Hx     Colon cancer Neg Hx     Esophageal cancer Neg Hx     Inflammatory bowel disease Neg Hx     Rectal cancer Neg Hx     Stomach cancer Neg Hx     Ulcerative colitis Neg Hx     Liver disease Neg Hx     Liver cancer Neg Hx     Crohn's disease Neg Hx     Melanoma Neg Hx        Patient's Medications   New Prescriptions    APIXABAN (ELIQUIS) 5 MG TAB    Take 1 tablet (5 mg total) by mouth 2 (two) times daily.   Previous Medications    ALBUTEROL (ACCUNEB) 1.25 MG/3 ML NEBU    Take 3 mLs (1.25 mg total) by nebulization every 6 (six) hours as needed. Rescue    ASPIRIN (ADULT ASPIRIN EC LOW STRENGTH) 81 MG EC TABLET    Take 1 tablet by mouth Daily.    ATORVASTATIN (LIPITOR) 10 MG TABLET    Take 1 tablet (10 mg total) by mouth once daily.    CALCIUM CARBONATE (OS-TRISH) 500 MG CALCIUM (1,250 MG) TABLET    Two by mouth twice a day    CARVEDILOL (COREG) 25 MG TABLET    Take 1 tablet (25 mg total) by mouth 2 (two) times daily with meals.    COLCHICINE (COLCRYS) 0.6 MG TABLET    One po BID up to three days prn gout flare    DOXAZOSIN (CARDURA) 4 MG TABLET    Take 1 tablet (4 mg total) by mouth every 12 (twelve) hours.    FAMOTIDINE (PEPCID) 20 MG TABLET    Take 1 tablet (20 mg total) by mouth 2 (two) times daily.    FEXOFENADINE (ALLEGRA) 60 MG TABLET    Take 1 tablet by mouth Twice daily as needed.    FLUTICASONE PROPIONATE (FLONASE) 50 MCG/ACTUATION NASAL SPRAY    1 spray (50 mcg total) by Each Nostril route once daily.    FUROSEMIDE (LASIX) 40 MG TABLET    Take 0.5 tablets (20 mg total) by mouth 2 (two) times daily.    GABAPENTIN (NEURONTIN) 300 MG CAPSULE    Take 1 capsule (300 mg total) by mouth 2 (two) times daily.    INV HYDRALAZINE (APRESOLINE) 25 MG TAB    Take 1 tablet (25 mg total) by mouth 3 (three) times daily.    KLOR-CON 10 10 MEQ TBSR    TAKE 1 TABLET (10 MEQ TOTAL) BY MOUTH ONCE DAILY.    MULTIVITAMIN (THERAGRAN) PER TABLET    Take 1  tablet by mouth Daily.    MYCOPHENOLATE (CELLCEPT) 250 MG CAP    Take 2 capsules (500 mg total) by mouth 2 (two) times daily.    NICOTINE (NICODERM CQ) 21 MG/24 HR    Place 1 patch onto the skin once daily.    NIFEDIPINE (PROCARDIA-XL) 90 MG (OSM) 24 HR TABLET    Take 1 tablet (90 mg total) by mouth once daily.    PARICALCITOL (ZEMPLAR) 1 MCG CAPSULE    One po MWF    PREDNISONE (DELTASONE) 5 MG TABLET    Take 1 tablet (5 mg total) by mouth every morning.    SPIRONOLACTONE (ALDACTONE) 25 MG TABLET    Take 1 tablet (25 mg total) by mouth once daily.    TACROLIMUS (PROGRAF) 1 MG CAP    Take 2 capsules (2 mg total) by mouth every 12 (twelve) hours. TAKE 2 CAPSULES BY MOUTH EVERY MORNING AND 2 CAPSULES EVERY EVENING.  Z94.0 kidney transplant    TRIAMCINOLONE ACETONIDE 0.1% (KENALOG) 0.1 % CREAM    AAA bid to rash on ankles    VITAMIN D 1000 UNITS TAB    Take 370 mg by mouth 2 (two) times daily. 2 tablets BID   Modified Medications    No medications on file   Discontinued Medications    WARFARIN (COUMADIN) 5 MG TABLET    Take 0.5-1 pill by mouth daily Or as directed by [facility]; #75 pills = 3-month supply       Review of Systems   Constitution: Negative for malaise/fatigue and weight gain.   HENT: Negative for hearing loss.    Eyes: Negative for visual disturbance.   Cardiovascular: Positive for dyspnea on exertion and palpitations. Negative for chest pain, claudication, leg swelling, near-syncope, orthopnea, paroxysmal nocturnal dyspnea and syncope.   Respiratory: Negative for cough, shortness of breath, sleep disturbances due to breathing, snoring and wheezing.    Endocrine: Negative for cold intolerance, heat intolerance, polydipsia, polyphagia and polyuria.   Hematologic/Lymphatic: Negative for bleeding problem. Does not bruise/bleed easily.   Skin: Negative for rash and suspicious lesions.   Musculoskeletal: Negative for arthritis, falls, joint pain, muscle weakness and myalgias.   Gastrointestinal: Negative  "for abdominal pain, change in bowel habit, constipation, diarrhea, heartburn, hematochezia, melena and nausea.   Genitourinary: Negative for hematuria and nocturia.   Neurological: Negative for excessive daytime sleepiness, dizziness, headaches, light-headedness, loss of balance and weakness.   Psychiatric/Behavioral: Negative for depression. The patient is not nervous/anxious.    Allergic/Immunologic: Negative for environmental allergies.       BP (!) 145/70   Pulse (!) 59   Ht 6' 1" (1.854 m)   Wt 101.6 kg (223 lb 15.8 oz)   SpO2 99%   BMI 29.55 kg/m²     Objective:   Physical Exam   Constitutional: He is oriented to person, place, and time. He appears well-developed and well-nourished.        HENT:   Head: Normocephalic and atraumatic.   Mouth/Throat: Oropharynx is clear and moist.   Eyes: Pupils are equal, round, and reactive to light. Conjunctivae and EOM are normal. No scleral icterus.   Neck: Normal range of motion. Neck supple. No hepatojugular reflux and no JVD present. No tracheal deviation present. No thyromegaly present.   Cardiovascular: Regular rhythm, normal heart sounds and intact distal pulses. Frequent extrasystoles are present. Bradycardia present. PMI is not displaced.   Pulses:       Carotid pulses are 2+ on the right side, and 2+ on the left side.       Radial pulses are 2+ on the right side, and 2+ on the left side.        Dorsalis pedis pulses are 2+ on the right side, and 2+ on the left side.        Posterior tibial pulses are 2+ on the right side, and 2+ on the left side.   Pulmonary/Chest: Effort normal and breath sounds normal.   Abdominal: Soft. Bowel sounds are normal. He exhibits no distension and no mass. There is no hepatosplenomegaly. There is no tenderness.   Musculoskeletal: He exhibits no edema or tenderness.   Lymphadenopathy:     He has no cervical adenopathy.   Neurological: He is alert and oriented to person, place, and time.   Skin: Skin is warm and dry. No rash noted. " No cyanosis or erythema. Nails show no clubbing.   Psychiatric: He has a normal mood and affect. His speech is normal and behavior is normal.       Lab Results   Component Value Date     04/22/2020    K 3.7 04/22/2020     04/22/2020    CO2 21 (L) 04/22/2020    BUN 33 (H) 04/22/2020    CREATININE 2.4 (H) 04/22/2020     (H) 04/22/2020    HGBA1C 5.9 (H) 11/15/2019    MG 2.2 04/08/2020    AST 25 04/08/2020    ALT 51 (H) 04/08/2020    ALBUMIN 3.2 (L) 04/08/2020    ALBUMIN 3.2 (L) 04/08/2020    PROT 6.7 04/08/2020    BILITOT 0.3 04/08/2020    WBC 9.26 04/08/2020    HGB 10.6 (L) 04/08/2020    HCT 35.5 (L) 04/08/2020    MCV 82 04/08/2020     04/08/2020    INR 1.4 (H) 06/03/2020    TSH 1.434 02/02/2019    CHOL 130 04/12/2019    HDL 39 (L) 04/12/2019    LDLCALC 69.4 04/12/2019    TRIG 108 04/12/2019     (H) 03/23/2020       Assessment:     1. Chronic heart failure with preserved ejection fraction : He appears well compensated and euvolemic.   2. Paroxysmal atrial fibrillation : Will change coumadin to eliquis 5 mg bid to start once his INR < 2.0. Continue carvedilol.   3. Coronary artery disease involving native coronary artery of native heart without angina pectoris : Continue atorvastatin. SPECT ordered for worsening DAUGHERTY.   4. Chronic diastolic heart failure    5. Mixed hyperlipidemia : Continue atorvastatin. FLP ordered.   6. CKD (chronic kidney disease) stage 3, GFR 30-59 ml/min    7. H/O kidney transplant    8. DAUGHERTY (dyspnea on exertion)    9. Tobacco use : Smoking cessation encouraged.       Plan:     Ibrahima was seen today for hypertension.    Diagnoses and all orders for this visit:    Chronic heart failure with preserved ejection fraction    Paroxysmal atrial fibrillation  -     Protime-INR; Future    Coronary artery disease involving native coronary artery of native heart without angina pectoris  -     Nuclear Stress - Cardiology Interpreted; Future    Chronic diastolic heart failure  -      Nuclear Stress - Cardiology Interpreted; Future    Mixed hyperlipidemia    CKD (chronic kidney disease) stage 3, GFR 30-59 ml/min    H/O kidney transplant    DAUGHERTY (dyspnea on exertion)  -     Nuclear Stress - Cardiology Interpreted; Future    Tobacco use    Other orders  -     apixaban (ELIQUIS) 5 mg Tab; Take 1 tablet (5 mg total) by mouth 2 (two) times daily.        Thank you for allowing me to participate in this patient's care. Please do not hesitate to contact me with any questions or concerns.

## 2020-06-11 NOTE — PATIENT INSTRUCTIONS
Increase hydralazine to 25 mg three times daily.    Start eliquis 5 mg twice daily once your INR is less than 2.0.

## 2020-06-12 NOTE — PROGRESS NOTES
Patient reports he has not yet picked up his eliquis prescription but plans to do so in the next few days. He already has an INR scheduled for 6/17. He may start his Eliquis when his INR is less than 2. He will hold coumadin 6/15-6/16 and based on 6/17 INR we will instruct him on the transition of coumadin to eliquis.

## 2020-06-17 ENCOUNTER — ANTI-COAG VISIT (OUTPATIENT)
Dept: CARDIOLOGY | Facility: CLINIC | Age: 65
End: 2020-06-17
Payer: MEDICARE

## 2020-06-17 ENCOUNTER — LAB VISIT (OUTPATIENT)
Dept: LAB | Facility: HOSPITAL | Age: 65
End: 2020-06-17
Attending: INTERNAL MEDICINE
Payer: MEDICARE

## 2020-06-17 DIAGNOSIS — Z79.01 LONG TERM (CURRENT) USE OF ANTICOAGULANTS: ICD-10-CM

## 2020-06-17 DIAGNOSIS — I48.91 ATRIAL FIBRILLATION WITH RAPID VENTRICULAR RESPONSE: ICD-10-CM

## 2020-06-17 DIAGNOSIS — R73.03 PREDIABETES: ICD-10-CM

## 2020-06-17 DIAGNOSIS — R53.83 FATIGUE, UNSPECIFIED TYPE: ICD-10-CM

## 2020-06-17 DIAGNOSIS — D63.1 ANEMIA DUE TO STAGE 3 CHRONIC KIDNEY DISEASE: ICD-10-CM

## 2020-06-17 DIAGNOSIS — I13.0 HYPERTENSIVE HEART AND RENAL DISEASE WITH RENAL FAILURE, STAGE 1 THROUGH STAGE 4 OR UNSPECIFIED CHRONIC KIDNEY DISEASE, WITH HEART FAILURE: ICD-10-CM

## 2020-06-17 DIAGNOSIS — I47.10 SUPRAVENTRICULAR TACHYCARDIA: ICD-10-CM

## 2020-06-17 DIAGNOSIS — N18.30 ANEMIA DUE TO STAGE 3 CHRONIC KIDNEY DISEASE: ICD-10-CM

## 2020-06-17 DIAGNOSIS — N18.30 CKD (CHRONIC KIDNEY DISEASE) STAGE 3, GFR 30-59 ML/MIN: ICD-10-CM

## 2020-06-17 DIAGNOSIS — I48.0 PAROXYSMAL ATRIAL FIBRILLATION: ICD-10-CM

## 2020-06-17 DIAGNOSIS — E78.2 MIXED HYPERLIPIDEMIA: ICD-10-CM

## 2020-06-17 LAB
ALBUMIN SERPL BCP-MCNC: 3.4 G/DL (ref 3.5–5.2)
ALP SERPL-CCNC: 70 U/L (ref 55–135)
ALT SERPL W/O P-5'-P-CCNC: 13 U/L (ref 10–44)
ANION GAP SERPL CALC-SCNC: 10 MMOL/L (ref 8–16)
AST SERPL-CCNC: 20 U/L (ref 10–40)
BASOPHILS # BLD AUTO: 0.03 K/UL (ref 0–0.2)
BASOPHILS NFR BLD: 0.5 % (ref 0–1.9)
BILIRUB SERPL-MCNC: 0.3 MG/DL (ref 0.1–1)
BUN SERPL-MCNC: 26 MG/DL (ref 8–23)
CALCIUM SERPL-MCNC: 8.7 MG/DL (ref 8.7–10.5)
CHLORIDE SERPL-SCNC: 109 MMOL/L (ref 95–110)
CHOLEST SERPL-MCNC: 137 MG/DL (ref 120–199)
CHOLEST/HDLC SERPL: 2.8 {RATIO} (ref 2–5)
CO2 SERPL-SCNC: 25 MMOL/L (ref 23–29)
CREAT SERPL-MCNC: 2.1 MG/DL (ref 0.5–1.4)
DIFFERENTIAL METHOD: ABNORMAL
EOSINOPHIL # BLD AUTO: 0.3 K/UL (ref 0–0.5)
EOSINOPHIL NFR BLD: 4.4 % (ref 0–8)
ERYTHROCYTE [DISTWIDTH] IN BLOOD BY AUTOMATED COUNT: 16 % (ref 11.5–14.5)
EST. GFR  (AFRICAN AMERICAN): 37.1 ML/MIN/1.73 M^2
EST. GFR  (NON AFRICAN AMERICAN): 32.1 ML/MIN/1.73 M^2
ESTIMATED AVG GLUCOSE: 114 MG/DL (ref 68–131)
FERRITIN SERPL-MCNC: 777 NG/ML (ref 20–300)
GLUCOSE SERPL-MCNC: 113 MG/DL (ref 70–110)
HBA1C MFR BLD HPLC: 5.6 % (ref 4–5.6)
HCT VFR BLD AUTO: 37.5 % (ref 40–54)
HDLC SERPL-MCNC: 49 MG/DL (ref 40–75)
HDLC SERPL: 35.8 % (ref 20–50)
HGB BLD-MCNC: 11.4 G/DL (ref 14–18)
IMM GRANULOCYTES # BLD AUTO: 0.03 K/UL (ref 0–0.04)
IMM GRANULOCYTES NFR BLD AUTO: 0.5 % (ref 0–0.5)
INR PPP: 1.2 (ref 0.8–1.2)
INR PPP: 1.2 (ref 0.8–1.2)
IRON SERPL-MCNC: 62 UG/DL (ref 45–160)
LDLC SERPL CALC-MCNC: 59.4 MG/DL (ref 63–159)
LYMPHOCYTES # BLD AUTO: 1.6 K/UL (ref 1–4.8)
LYMPHOCYTES NFR BLD: 25 % (ref 18–48)
MCH RBC QN AUTO: 24.9 PG (ref 27–31)
MCHC RBC AUTO-ENTMCNC: 30.4 G/DL (ref 32–36)
MCV RBC AUTO: 82 FL (ref 82–98)
MONOCYTES # BLD AUTO: 0.7 K/UL (ref 0.3–1)
MONOCYTES NFR BLD: 11.1 % (ref 4–15)
NEUTROPHILS # BLD AUTO: 3.8 K/UL (ref 1.8–7.7)
NEUTROPHILS NFR BLD: 58.5 % (ref 38–73)
NONHDLC SERPL-MCNC: 88 MG/DL
NRBC BLD-RTO: 0 /100 WBC
PLATELET # BLD AUTO: 158 K/UL (ref 150–350)
PMV BLD AUTO: 13.2 FL (ref 9.2–12.9)
POTASSIUM SERPL-SCNC: 3.9 MMOL/L (ref 3.5–5.1)
PROT SERPL-MCNC: 6.8 G/DL (ref 6–8.4)
PROTHROMBIN TIME: 12.2 SEC (ref 9–12.5)
PROTHROMBIN TIME: 12.2 SEC (ref 9–12.5)
RBC # BLD AUTO: 4.57 M/UL (ref 4.6–6.2)
SATURATED IRON: 24 % (ref 20–50)
SODIUM SERPL-SCNC: 144 MMOL/L (ref 136–145)
TOTAL IRON BINDING CAPACITY: 256 UG/DL (ref 250–450)
TRANSFERRIN SERPL-MCNC: 173 MG/DL (ref 200–375)
TRIGL SERPL-MCNC: 143 MG/DL (ref 30–150)
TSH SERPL DL<=0.005 MIU/L-ACNC: 1.69 UIU/ML (ref 0.4–4)
WBC # BLD AUTO: 6.4 K/UL (ref 3.9–12.7)

## 2020-06-17 PROCEDURE — 83540 ASSAY OF IRON: CPT | Mod: HCNC

## 2020-06-17 PROCEDURE — 83036 HEMOGLOBIN GLYCOSYLATED A1C: CPT | Mod: HCNC

## 2020-06-17 PROCEDURE — 80061 LIPID PANEL: CPT | Mod: HCNC

## 2020-06-17 PROCEDURE — 93793 PR ANTICOAGULANT MGMT FOR PT TAKING WARFARIN: ICD-10-PCS | Mod: S$GLB,,, | Performed by: PHARMACIST

## 2020-06-17 PROCEDURE — 80053 COMPREHEN METABOLIC PANEL: CPT | Mod: HCNC

## 2020-06-17 PROCEDURE — 85610 PROTHROMBIN TIME: CPT | Mod: HCNC

## 2020-06-17 PROCEDURE — 36415 COLL VENOUS BLD VENIPUNCTURE: CPT | Mod: HCNC

## 2020-06-17 PROCEDURE — 93793 ANTICOAG MGMT PT WARFARIN: CPT | Mod: S$GLB,,, | Performed by: PHARMACIST

## 2020-06-17 PROCEDURE — 85025 COMPLETE CBC W/AUTO DIFF WBC: CPT | Mod: HCNC

## 2020-06-17 PROCEDURE — 82728 ASSAY OF FERRITIN: CPT | Mod: HCNC

## 2020-06-17 PROCEDURE — 84443 ASSAY THYROID STIM HORMONE: CPT | Mod: HCNC

## 2020-06-17 NOTE — PROGRESS NOTES
DR Blunt advised pt to hold SUN-TUES to start Eliquis;  Pt already pick meds up  [place] MON  NO other changes    Patient will be discharged from the coumadin clinic

## 2020-06-25 ENCOUNTER — TELEPHONE (OUTPATIENT)
Dept: CARDIOLOGY | Facility: CLINIC | Age: 65
End: 2020-06-25

## 2020-06-29 ENCOUNTER — CLINICAL SUPPORT (OUTPATIENT)
Dept: CARDIOLOGY | Facility: CLINIC | Age: 65
End: 2020-06-29
Attending: INTERNAL MEDICINE
Payer: MEDICARE

## 2020-06-29 VITALS — HEIGHT: 73 IN | WEIGHT: 223 LBS | BODY MASS INDEX: 29.55 KG/M2

## 2020-06-29 DIAGNOSIS — R06.09 DOE (DYSPNEA ON EXERTION): ICD-10-CM

## 2020-06-29 DIAGNOSIS — I50.32 CHRONIC DIASTOLIC HEART FAILURE: ICD-10-CM

## 2020-06-29 DIAGNOSIS — I25.10 CORONARY ARTERY DISEASE INVOLVING NATIVE CORONARY ARTERY OF NATIVE HEART WITHOUT ANGINA PECTORIS: ICD-10-CM

## 2020-06-29 LAB
CV PHARM DOSE: 0.4 MG
CV STRESS BASE HR: 53 BPM
DIASTOLIC BLOOD PRESSURE: 65 MMHG
END DIASTOLIC INDEX-HIGH: 170 ML/M2
END SYSTOLIC INDEX-HIGH: 70 ML/M2
NUC REST DIASTOLIC VOLUME INDEX: 125
NUC REST EJECTION FRACTION: 63
NUC REST SYSTOLIC VOLUME INDEX: 46
NUC STRESS DIASTOLIC VOLUME INDEX: 153
NUC STRESS EJECTION FRACTION: 67 %
NUC STRESS SYSTOLIC VOLUME INDEX: 50
OHS CV CPX 85 PERCENT MAX PREDICTED HEART RATE MALE: 132
OHS CV CPX MAX PREDICTED HEART RATE: 155
OHS CV CPX PATIENT IS FEMALE: 0
OHS CV CPX PATIENT IS MALE: 1
OHS CV CPX PEAK DIASTOLIC BLOOD PRESSURE: 74 MMHG
OHS CV CPX PEAK HEAR RATE: 63 BPM
OHS CV CPX PEAK RATE PRESSURE PRODUCT: 9387
OHS CV CPX PEAK SYSTOLIC BLOOD PRESSURE: 149 MMHG
OHS CV CPX PERCENT MAX PREDICTED HEART RATE ACHIEVED: 41
OHS CV CPX RATE PRESSURE PRODUCT PRESENTING: 7579
RETIRED EF AND QEF - SEE NOTES: 51 %
SYSTOLIC BLOOD PRESSURE: 143 MMHG

## 2020-06-29 PROCEDURE — 78452 HT MUSCLE IMAGE SPECT MULT: CPT | Mod: HCNC,S$GLB,, | Performed by: INTERNAL MEDICINE

## 2020-06-29 PROCEDURE — 93015 STRESS TEST WITH MYOCARDIAL PERFUSION (CUPID ONLY): ICD-10-PCS | Mod: HCNC,S$GLB,, | Performed by: INTERNAL MEDICINE

## 2020-06-29 PROCEDURE — 78452 STRESS TEST WITH MYOCARDIAL PERFUSION (CUPID ONLY): ICD-10-PCS | Mod: HCNC,S$GLB,, | Performed by: INTERNAL MEDICINE

## 2020-06-29 PROCEDURE — A9502 TC99M TETROFOSMIN: HCPCS | Mod: HCNC,S$GLB,, | Performed by: INTERNAL MEDICINE

## 2020-06-29 PROCEDURE — A9502 STRESS TEST WITH MYOCARDIAL PERFUSION (CUPID ONLY): ICD-10-PCS | Mod: HCNC,S$GLB,, | Performed by: INTERNAL MEDICINE

## 2020-06-29 PROCEDURE — 99999 PR PBB SHADOW E&M-EST. PATIENT-LVL II: CPT | Mod: PBBFAC,HCNC,,

## 2020-06-29 PROCEDURE — 93015 CV STRESS TEST SUPVJ I&R: CPT | Mod: HCNC,S$GLB,, | Performed by: INTERNAL MEDICINE

## 2020-06-29 PROCEDURE — 99999 PR PBB SHADOW E&M-EST. PATIENT-LVL II: ICD-10-PCS | Mod: PBBFAC,HCNC,,

## 2020-06-29 RX ORDER — REGADENOSON 0.08 MG/ML
0.4 INJECTION, SOLUTION INTRAVENOUS
Status: COMPLETED | OUTPATIENT
Start: 2020-06-29 | End: 2020-06-29

## 2020-06-29 RX ADMIN — REGADENOSON 0.4 MG: 0.08 INJECTION, SOLUTION INTRAVENOUS at 09:06

## 2020-07-17 ENCOUNTER — TELEPHONE (OUTPATIENT)
Dept: ENDOSCOPY | Facility: HOSPITAL | Age: 65
End: 2020-07-17

## 2020-07-17 NOTE — TELEPHONE ENCOUNTER
Hi,    Patient needs to be scheduled for an Colonoscopy. He is currently on Eliquis. Can hold his Eliquis prior to procedure per our Endoscopy Protocol? Please advise.    Thanks,  RUSH Nguyen

## 2020-07-22 ENCOUNTER — TELEPHONE (OUTPATIENT)
Dept: ENDOSCOPY | Facility: HOSPITAL | Age: 65
End: 2020-07-22

## 2020-08-03 ENCOUNTER — PATIENT OUTREACH (OUTPATIENT)
Dept: ADMINISTRATIVE | Facility: OTHER | Age: 65
End: 2020-08-03

## 2020-08-03 NOTE — PROGRESS NOTES
Chart reviewed.   Immunizations: updated  Orders placed: n/a  Upcoming appts to satisfy MARTINEZ topics: Optometry 8/05

## 2020-08-05 ENCOUNTER — OFFICE VISIT (OUTPATIENT)
Dept: OPTOMETRY | Facility: CLINIC | Age: 65
End: 2020-08-05
Payer: COMMERCIAL

## 2020-08-05 DIAGNOSIS — H52.4 HYPEROPIA OF BOTH EYES WITH REGULAR ASTIGMATISM AND PRESBYOPIA: ICD-10-CM

## 2020-08-05 DIAGNOSIS — H52.03 HYPEROPIA OF BOTH EYES WITH REGULAR ASTIGMATISM AND PRESBYOPIA: ICD-10-CM

## 2020-08-05 DIAGNOSIS — H52.223 HYPEROPIA OF BOTH EYES WITH REGULAR ASTIGMATISM AND PRESBYOPIA: ICD-10-CM

## 2020-08-05 DIAGNOSIS — Z01.00 EYE EXAM, ROUTINE: Primary | ICD-10-CM

## 2020-08-05 PROCEDURE — 99999 PR PBB SHADOW E&M-EST. PATIENT-LVL IV: ICD-10-PCS | Mod: PBBFAC,,, | Performed by: OPTOMETRIST

## 2020-08-05 PROCEDURE — 92015 PR REFRACTION: ICD-10-PCS | Mod: S$GLB,,, | Performed by: OPTOMETRIST

## 2020-08-05 PROCEDURE — 92004 PR EYE EXAM, NEW PATIENT,COMPREHESV: ICD-10-PCS | Mod: S$GLB,,, | Performed by: OPTOMETRIST

## 2020-08-05 PROCEDURE — 99999 PR PBB SHADOW E&M-EST. PATIENT-LVL IV: CPT | Mod: PBBFAC,,, | Performed by: OPTOMETRIST

## 2020-08-05 PROCEDURE — 92004 COMPRE OPH EXAM NEW PT 1/>: CPT | Mod: S$GLB,,, | Performed by: OPTOMETRIST

## 2020-08-05 PROCEDURE — 92015 DETERMINE REFRACTIVE STATE: CPT | Mod: S$GLB,,, | Performed by: OPTOMETRIST

## 2020-08-05 NOTE — PROGRESS NOTES
HPI     Pt states his last eye exam was sometime last year. Pt states at night   time while watching tv his OS would burn some. Pt denies any floaters,   flashes or diplopia. Pt states he only wears his prescription glasses to   read.     [facility]  Meds:  No GTTS    Last edited by Tim Fernandez, OD on 8/5/2020  9:52 AM. (History)            Assessment /Plan     For exam results, see Encounter Report.    Eye exam, routine  -[facility]    Hyperopia of both eyes with regular astigmatism and presbyopia  Eyeglass Final Rx     Eyeglass Final Rx       Sphere Cylinder Axis Dist VA Add    Right +0.50 +1.00 180 20/20 +2.50    Left +0.50 +1.25 175 20/20 +2.50    Type: PAL    Expiration Date: 8/6/2021                  RTC 1 yr

## 2020-08-18 ENCOUNTER — LAB VISIT (OUTPATIENT)
Dept: LAB | Facility: HOSPITAL | Age: 65
End: 2020-08-18
Attending: INTERNAL MEDICINE
Payer: MEDICARE

## 2020-08-18 DIAGNOSIS — E55.9 VITAMIN D DEFICIENCY: ICD-10-CM

## 2020-08-18 DIAGNOSIS — R73.03 PREDIABETES: ICD-10-CM

## 2020-08-18 DIAGNOSIS — Z94.0 H/O KIDNEY TRANSPLANT: ICD-10-CM

## 2020-08-18 DIAGNOSIS — N18.30 CKD (CHRONIC KIDNEY DISEASE) STAGE 3, GFR 30-59 ML/MIN: ICD-10-CM

## 2020-08-18 LAB
25(OH)D3+25(OH)D2 SERPL-MCNC: 57 NG/ML (ref 30–96)
ALBUMIN SERPL BCP-MCNC: 3.6 G/DL (ref 3.5–5.2)
ALBUMIN SERPL BCP-MCNC: 3.6 G/DL (ref 3.5–5.2)
ALP SERPL-CCNC: 63 U/L (ref 55–135)
ALT SERPL W/O P-5'-P-CCNC: 15 U/L (ref 10–44)
ANION GAP SERPL CALC-SCNC: 10 MMOL/L (ref 8–16)
AST SERPL-CCNC: 20 U/L (ref 10–40)
BASOPHILS # BLD AUTO: 0.04 K/UL (ref 0–0.2)
BASOPHILS NFR BLD: 0.5 % (ref 0–1.9)
BILIRUB DIRECT SERPL-MCNC: 0.1 MG/DL (ref 0.1–0.3)
BILIRUB SERPL-MCNC: 0.2 MG/DL (ref 0.1–1)
BUN SERPL-MCNC: 22 MG/DL (ref 8–23)
CALCIUM SERPL-MCNC: 8.4 MG/DL (ref 8.7–10.5)
CHLORIDE SERPL-SCNC: 108 MMOL/L (ref 95–110)
CO2 SERPL-SCNC: 24 MMOL/L (ref 23–29)
CREAT SERPL-MCNC: 2 MG/DL (ref 0.5–1.4)
DIFFERENTIAL METHOD: ABNORMAL
EOSINOPHIL # BLD AUTO: 0.4 K/UL (ref 0–0.5)
EOSINOPHIL NFR BLD: 4.7 % (ref 0–8)
ERYTHROCYTE [DISTWIDTH] IN BLOOD BY AUTOMATED COUNT: 14.7 % (ref 11.5–14.5)
EST. GFR  (AFRICAN AMERICAN): 39.3 ML/MIN/1.73 M^2
EST. GFR  (NON AFRICAN AMERICAN): 34 ML/MIN/1.73 M^2
ESTIMATED AVG GLUCOSE: 111 MG/DL (ref 68–131)
GLUCOSE SERPL-MCNC: 98 MG/DL (ref 70–110)
HBA1C MFR BLD HPLC: 5.5 % (ref 4–5.6)
HCT VFR BLD AUTO: 39 % (ref 40–54)
HGB BLD-MCNC: 11.6 G/DL (ref 14–18)
IMM GRANULOCYTES # BLD AUTO: 0.02 K/UL (ref 0–0.04)
IMM GRANULOCYTES NFR BLD AUTO: 0.3 % (ref 0–0.5)
LYMPHOCYTES # BLD AUTO: 2 K/UL (ref 1–4.8)
LYMPHOCYTES NFR BLD: 26.7 % (ref 18–48)
MAGNESIUM SERPL-MCNC: 2.1 MG/DL (ref 1.6–2.6)
MCH RBC QN AUTO: 25.1 PG (ref 27–31)
MCHC RBC AUTO-ENTMCNC: 29.7 G/DL (ref 32–36)
MCV RBC AUTO: 84 FL (ref 82–98)
MONOCYTES # BLD AUTO: 0.8 K/UL (ref 0.3–1)
MONOCYTES NFR BLD: 10.3 % (ref 4–15)
NEUTROPHILS # BLD AUTO: 4.3 K/UL (ref 1.8–7.7)
NEUTROPHILS NFR BLD: 57.5 % (ref 38–73)
NRBC BLD-RTO: 0 /100 WBC
PHOSPHATE SERPL-MCNC: 3.5 MG/DL (ref 2.7–4.5)
PLATELET # BLD AUTO: 166 K/UL (ref 150–350)
PMV BLD AUTO: 12.9 FL (ref 9.2–12.9)
POTASSIUM SERPL-SCNC: 4.1 MMOL/L (ref 3.5–5.1)
PROT SERPL-MCNC: 6.7 G/DL (ref 6–8.4)
PTH-INTACT SERPL-MCNC: 66 PG/ML (ref 9–77)
RBC # BLD AUTO: 4.63 M/UL (ref 4.6–6.2)
SODIUM SERPL-SCNC: 142 MMOL/L (ref 136–145)
TACROLIMUS BLD-MCNC: 7.9 NG/ML (ref 5–15)
URATE SERPL-MCNC: 8.3 MG/DL (ref 3.4–7)
WBC # BLD AUTO: 7.45 K/UL (ref 3.9–12.7)

## 2020-08-18 PROCEDURE — 85025 COMPLETE CBC W/AUTO DIFF WBC: CPT | Mod: HCNC

## 2020-08-18 PROCEDURE — 84550 ASSAY OF BLOOD/URIC ACID: CPT | Mod: HCNC

## 2020-08-18 PROCEDURE — 80069 RENAL FUNCTION PANEL: CPT | Mod: HCNC

## 2020-08-18 PROCEDURE — 82306 VITAMIN D 25 HYDROXY: CPT | Mod: HCNC

## 2020-08-18 PROCEDURE — 80197 ASSAY OF TACROLIMUS: CPT | Mod: HCNC

## 2020-08-18 PROCEDURE — 84075 ASSAY ALKALINE PHOSPHATASE: CPT | Mod: HCNC

## 2020-08-18 PROCEDURE — 83970 ASSAY OF PARATHORMONE: CPT | Mod: HCNC

## 2020-08-18 PROCEDURE — 83735 ASSAY OF MAGNESIUM: CPT | Mod: HCNC

## 2020-08-18 PROCEDURE — 36415 COLL VENOUS BLD VENIPUNCTURE: CPT | Mod: HCNC

## 2020-08-18 PROCEDURE — 83036 HEMOGLOBIN GLYCOSYLATED A1C: CPT | Mod: HCNC

## 2020-08-19 ENCOUNTER — OFFICE VISIT (OUTPATIENT)
Dept: NEPHROLOGY | Facility: CLINIC | Age: 65
End: 2020-08-19
Payer: MEDICARE

## 2020-08-19 VITALS
DIASTOLIC BLOOD PRESSURE: 70 MMHG | SYSTOLIC BLOOD PRESSURE: 138 MMHG | WEIGHT: 233.69 LBS | OXYGEN SATURATION: 97 % | BODY MASS INDEX: 30.83 KG/M2 | HEART RATE: 64 BPM

## 2020-08-19 DIAGNOSIS — Z86.79 HISTORY OF PERICARDITIS: ICD-10-CM

## 2020-08-19 DIAGNOSIS — R73.03 PREDIABETES: ICD-10-CM

## 2020-08-19 DIAGNOSIS — I10 ESSENTIAL HYPERTENSION: ICD-10-CM

## 2020-08-19 DIAGNOSIS — I25.10 CORONARY ARTERY DISEASE INVOLVING NATIVE CORONARY ARTERY OF NATIVE HEART WITHOUT ANGINA PECTORIS: ICD-10-CM

## 2020-08-19 DIAGNOSIS — J43.8 OTHER EMPHYSEMA: ICD-10-CM

## 2020-08-19 DIAGNOSIS — Z72.0 TOBACCO USE: ICD-10-CM

## 2020-08-19 DIAGNOSIS — D84.821 IMMUNODEFICIENCY DUE TO TREATMENT WITH IMMUNOSUPPRESSIVE MEDICATION: ICD-10-CM

## 2020-08-19 DIAGNOSIS — I13.0 HYPERTENSIVE HEART AND RENAL DISEASE WITH RENAL FAILURE, STAGE 1 THROUGH STAGE 4 OR UNSPECIFIED CHRONIC KIDNEY DISEASE, WITH HEART FAILURE: ICD-10-CM

## 2020-08-19 DIAGNOSIS — Z94.0 H/O KIDNEY TRANSPLANT: ICD-10-CM

## 2020-08-19 DIAGNOSIS — N18.30 ANEMIA IN STAGE 3 CHRONIC KIDNEY DISEASE: ICD-10-CM

## 2020-08-19 DIAGNOSIS — K57.90 DIVERTICULOSIS OF INTESTINE WITHOUT BLEEDING, UNSPECIFIED INTESTINAL TRACT LOCATION: ICD-10-CM

## 2020-08-19 DIAGNOSIS — R73.9 HYPERGLYCEMIA: ICD-10-CM

## 2020-08-19 DIAGNOSIS — D50.8 OTHER IRON DEFICIENCY ANEMIA: ICD-10-CM

## 2020-08-19 DIAGNOSIS — N25.81 SECONDARY RENAL HYPERPARATHYROIDISM: ICD-10-CM

## 2020-08-19 DIAGNOSIS — I48.0 PAROXYSMAL ATRIAL FIBRILLATION: ICD-10-CM

## 2020-08-19 DIAGNOSIS — R91.8 MULTIPLE LUNG NODULES ON CT: ICD-10-CM

## 2020-08-19 DIAGNOSIS — E66.9 OBESITY (BMI 30-39.9): ICD-10-CM

## 2020-08-19 DIAGNOSIS — M89.8X9 METABOLIC BONE DISEASE: ICD-10-CM

## 2020-08-19 DIAGNOSIS — I50.30 HEART FAILURE WITH PRESERVED EJECTION FRACTION, UNSPECIFIED HF CHRONICITY: ICD-10-CM

## 2020-08-19 DIAGNOSIS — R21 RASH: ICD-10-CM

## 2020-08-19 DIAGNOSIS — D63.1 ANEMIA DUE TO STAGE 3 CHRONIC KIDNEY DISEASE: ICD-10-CM

## 2020-08-19 DIAGNOSIS — E78.2 MIXED HYPERLIPIDEMIA: ICD-10-CM

## 2020-08-19 DIAGNOSIS — E11.9 TYPE 2 DIABETES MELLITUS WITHOUT COMPLICATION, WITHOUT LONG-TERM CURRENT USE OF INSULIN: ICD-10-CM

## 2020-08-19 DIAGNOSIS — Z79.899 IMMUNOSUPPRESSIVE MANAGEMENT ENCOUNTER FOLLOWING KIDNEY TRANSPLANT: ICD-10-CM

## 2020-08-19 DIAGNOSIS — E55.9 VITAMIN D DEFICIENCY: ICD-10-CM

## 2020-08-19 DIAGNOSIS — R09.89 CAROTID BRUIT, UNSPECIFIED LATERALITY: ICD-10-CM

## 2020-08-19 DIAGNOSIS — N18.30 CKD (CHRONIC KIDNEY DISEASE) STAGE 3, GFR 30-59 ML/MIN: Primary | ICD-10-CM

## 2020-08-19 DIAGNOSIS — Z94.0 KIDNEY TRANSPLANT RECIPIENT: ICD-10-CM

## 2020-08-19 DIAGNOSIS — Z79.899 IMMUNODEFICIENCY DUE TO TREATMENT WITH IMMUNOSUPPRESSIVE MEDICATION: ICD-10-CM

## 2020-08-19 DIAGNOSIS — N18.30 ANEMIA DUE TO STAGE 3 CHRONIC KIDNEY DISEASE: ICD-10-CM

## 2020-08-19 DIAGNOSIS — I50.32 CHRONIC HEART FAILURE WITH PRESERVED EJECTION FRACTION: ICD-10-CM

## 2020-08-19 DIAGNOSIS — D63.1 ANEMIA IN STAGE 3 CHRONIC KIDNEY DISEASE: ICD-10-CM

## 2020-08-19 DIAGNOSIS — Z79.01 LONG TERM (CURRENT) USE OF ANTICOAGULANTS: ICD-10-CM

## 2020-08-19 DIAGNOSIS — Z94.0 IMMUNOSUPPRESSIVE MANAGEMENT ENCOUNTER FOLLOWING KIDNEY TRANSPLANT: ICD-10-CM

## 2020-08-19 PROCEDURE — 99499 UNLISTED E&M SERVICE: CPT | Mod: HCNC,S$GLB,, | Performed by: INTERNAL MEDICINE

## 2020-08-19 PROCEDURE — 3008F PR BODY MASS INDEX (BMI) DOCUMENTED: ICD-10-PCS | Mod: HCNC,CPTII,S$GLB, | Performed by: INTERNAL MEDICINE

## 2020-08-19 PROCEDURE — 99999 PR PBB SHADOW E&M-EST. PATIENT-LVL IV: CPT | Mod: PBBFAC,HCNC,, | Performed by: INTERNAL MEDICINE

## 2020-08-19 PROCEDURE — 3008F BODY MASS INDEX DOCD: CPT | Mod: HCNC,CPTII,S$GLB, | Performed by: INTERNAL MEDICINE

## 2020-08-19 PROCEDURE — 3078F DIAST BP <80 MM HG: CPT | Mod: HCNC,CPTII,S$GLB, | Performed by: INTERNAL MEDICINE

## 2020-08-19 PROCEDURE — 3078F PR MOST RECENT DIASTOLIC BLOOD PRESSURE < 80 MM HG: ICD-10-PCS | Mod: HCNC,CPTII,S$GLB, | Performed by: INTERNAL MEDICINE

## 2020-08-19 PROCEDURE — 3044F PR MOST RECENT HEMOGLOBIN A1C LEVEL <7.0%: ICD-10-PCS | Mod: HCNC,CPTII,S$GLB, | Performed by: INTERNAL MEDICINE

## 2020-08-19 PROCEDURE — 3075F SYST BP GE 130 - 139MM HG: CPT | Mod: HCNC,CPTII,S$GLB, | Performed by: INTERNAL MEDICINE

## 2020-08-19 PROCEDURE — 99214 OFFICE O/P EST MOD 30 MIN: CPT | Mod: HCNC,S$GLB,, | Performed by: INTERNAL MEDICINE

## 2020-08-19 PROCEDURE — 99214 PR OFFICE/OUTPT VISIT, EST, LEVL IV, 30-39 MIN: ICD-10-PCS | Mod: HCNC,S$GLB,, | Performed by: INTERNAL MEDICINE

## 2020-08-19 PROCEDURE — 3075F PR MOST RECENT SYSTOLIC BLOOD PRESS GE 130-139MM HG: ICD-10-PCS | Mod: HCNC,CPTII,S$GLB, | Performed by: INTERNAL MEDICINE

## 2020-08-19 PROCEDURE — 99999 PR PBB SHADOW E&M-EST. PATIENT-LVL IV: ICD-10-PCS | Mod: PBBFAC,HCNC,, | Performed by: INTERNAL MEDICINE

## 2020-08-19 PROCEDURE — 99499 RISK ADDL DX/OHS AUDIT: ICD-10-PCS | Mod: HCNC,S$GLB,, | Performed by: INTERNAL MEDICINE

## 2020-08-19 PROCEDURE — 1101F PT FALLS ASSESS-DOCD LE1/YR: CPT | Mod: HCNC,CPTII,S$GLB, | Performed by: INTERNAL MEDICINE

## 2020-08-19 PROCEDURE — 1101F PR PT FALLS ASSESS DOC 0-1 FALLS W/OUT INJ PAST YR: ICD-10-PCS | Mod: HCNC,CPTII,S$GLB, | Performed by: INTERNAL MEDICINE

## 2020-08-19 PROCEDURE — 3044F HG A1C LEVEL LT 7.0%: CPT | Mod: HCNC,CPTII,S$GLB, | Performed by: INTERNAL MEDICINE

## 2020-08-19 NOTE — PROGRESS NOTES
Subjective:       Patient ID: Ibrahima Phelps is a 64 y.o. Black or  male who presents for follow-up evaluation of Chronic Kidney Disease    HPI     He is getting over an URI, has not 'gone to his chest' but now feeling better. He continues to smoke a few cigarettes a day. Off PPI for several months now and without dyspepsia. No LE edema and no SOB.  He's noticed some allograft 'twinges' but no overt pain but no problems with urination. His wife is still working at [employer] as a manager of housing for [employer].     Interval history March 2019: he has three concerns:  1. Worsening tremors. Spilling corn and trouble with writing  2. Increased nocturia  3. Cold intolerance    He denies recent illness. No allogarft pain and no LUTS other than nocturia. He has unintentionally lost weight. No new medications. He is doing well off PPI. He is smoking 3 cigarettes to 3/4ppd depending on the day.     Interval history July 2019: he was hospitalized for CP and was found to be in SVT, his potassium was low in ER. He was CP free in ER after X 1 SL NTG and after SVT broke. Since then he feels well. He has lost a little bit of weight. Still smoking     Interval history Nov 2019: feels poorly--has URI and lost voice, no sick contacts. Kidney txp is managing Prograf--Cr is higher than baseline but Prograf is running high. No LUTS and no allograft pain.     Interval history April 2020:  The patient location is: Home  The chief complaint leading to consultation is: CKD and kidney transplant recioient  Visit type: audiovisual  Total time spent with patient:  28 minutes  Each patient to whom he or she provides medical services by telemedicine is:  (1) informed of the relationship between the physician and patient and the respective role of any other health care provider with respect to management of the patient; and (2) notified that he or she may decline to receive medical services by telemedicine and may withdraw from such  care at any time.  Notes: He is doing well. He stays home, his wife does the shopping and errands. He was admitted to the hospital in late March for chest pain, felt to be musculoskeletal. They started hydralazine for BP, lowered too much at home with symptoms and Cards advised to cut in half. No LE edema. No allograft pain and no LUTS. He still takes his calcium as instructed.     Interval history Aug 2020:  He feels well. No hospital stays since last visit. He notes a skin lesion to right wrist, not pruritic, he noted similar spots when he was on coumadin but not as many when he was on Eliquis, it is actually improving. It  doesn't justice. He had one day of allograft tenderness but no LUTS associated with it and no recurrence. No new medications.       Review of Systems   Constitutional: Positive for unexpected weight change. Negative for activity change, appetite change, fatigue and fever.   HENT: Negative for congestion, facial swelling, postnasal drip, rhinorrhea and voice change.    Eyes: Negative for photophobia, itching and visual disturbance.   Respiratory: Negative for cough and shortness of breath.    Cardiovascular: Negative for chest pain and leg swelling.   Gastrointestinal: Negative for abdominal distention, abdominal pain (allograft discomfort as in HPI), blood in stool, constipation, diarrhea, nausea and vomiting.   Endocrine: Positive for cold intolerance. Negative for heat intolerance, polydipsia and polyuria.   Genitourinary: Positive for frequency. Negative for decreased urine volume, difficulty urinating, dysuria, hematuria and urgency.   Musculoskeletal: Positive for arthralgias and back pain. Negative for joint swelling.   Skin: Negative for rash.   Allergic/Immunologic: Positive for environmental allergies and immunocompromised state.   Neurological: Positive for tremors. Negative for dizziness, seizures, speech difficulty, weakness, light-headedness and headaches.   Hematological: Does not  bruise/bleed easily.   Psychiatric/Behavioral: Negative for decreased concentration and sleep disturbance. The patient is not nervous/anxious.        Objective:      Physical Exam  Vitals signs and nursing note reviewed.   Constitutional:       General: He is not in acute distress.     Appearance: Normal appearance. He is normal weight.   HENT:      Head: Atraumatic.      Nose: No congestion.   Eyes:      General: No scleral icterus.  Cardiovascular:      Rate and Rhythm: Normal rate.      Heart sounds: No friction rub.   Pulmonary:      Effort: No respiratory distress.      Breath sounds: No wheezing or rales.   Abdominal:      Palpations: Abdomen is soft.   Musculoskeletal:      Right lower leg: No edema.      Left lower leg: No edema.   Skin:     General: Skin is warm and dry.      Coloration: Skin is not jaundiced.   Neurological:      General: No focal deficit present.      Mental Status: He is alert and oriented to person, place, and time. Mental status is at baseline.   Psychiatric:         Mood and Affect: Mood normal.         Behavior: Behavior normal.         Thought Content: Thought content normal.         Assessment:       1. CKD (chronic kidney disease) stage 3, GFR 30-59 ml/min    2. H/O kidney transplant    3. Immunosuppressive management encounter following kidney transplant    4. Metabolic bone disease    5. Secondary renal hyperparathyroidism    6. Prediabetes    7. Type 2 diabetes mellitus without complication, without long-term current use of insulin    8. Hypertensive heart and renal disease with renal failure, stage 1 through stage 4 or unspecified chronic kidney disease, with heart failure    9. Kidney transplant recipient    10. Heart failure with preserved ejection fraction, unspecified HF chronicity    11. Paroxysmal atrial fibrillation    12. Mixed hyperlipidemia    13. Coronary artery disease involving native coronary artery of native heart without angina pectoris    14. Anemia due to  stage 3 chronic kidney disease    15. History of pericarditis    16. Chronic heart failure with preserved ejection fraction    17. Vitamin D deficiency    18. Tobacco use    19. Immunodeficiency due to treatment with immunosuppressive medication    20. Other iron deficiency anemia    21. Other emphysema    22. Obesity (BMI 30-39.9)    23. Essential hypertension    24. Diverticulosis of intestine without bleeding, unspecified intestinal tract location    25. Hyperglycemia    26. Rash    27. Anemia in stage 3 chronic kidney disease    28. Long term (current) use of anticoagulants [V58.61]    29. Carotid bruit, unspecified laterality    30. Multiple lung nodules on CT        Plan:             ESRD s/p kidney transplsant with resultant CKD. Stable allograft function. Tac level at goal (4-6) Repeat level    CKD with subnephrotic proteinuria--increased level, may be due to increased BP. Will have him recheck when 'safe'     HTN is controlled, continue to monitor on addition of hydralazine    DM2 post transplant, steroid induced/PreDiabetes--stable/improved    Mineral and Bone Disease--continue current treatment with calcium supplements, Zemplar and D3    Back pain--no NSAIDs, OK for gabapentin    Tobacco use--counseled          RTC 4 months with labs prior

## 2020-08-29 ENCOUNTER — TELEPHONE (OUTPATIENT)
Dept: NEPHROLOGY | Facility: CLINIC | Age: 65
End: 2020-08-29

## 2020-08-29 DIAGNOSIS — N18.30 CKD (CHRONIC KIDNEY DISEASE) STAGE 3, GFR 30-59 ML/MIN: Primary | ICD-10-CM

## 2020-08-29 DIAGNOSIS — Z94.0 H/O KIDNEY TRANSPLANT: ICD-10-CM

## 2020-08-29 PROBLEM — R79.1 SUBTHERAPEUTIC INTERNATIONAL NORMALIZED RATIO (INR): Status: RESOLVED | Noted: 2020-03-23 | Resolved: 2020-08-29

## 2020-08-29 NOTE — TELEPHONE ENCOUNTER
Please schedule urine and Prograf/tacrolimus labs for pt--he will let us know time and day. Thank you

## 2020-09-02 ENCOUNTER — LAB VISIT (OUTPATIENT)
Dept: LAB | Facility: HOSPITAL | Age: 65
End: 2020-09-02
Attending: INTERNAL MEDICINE
Payer: MEDICARE

## 2020-09-02 DIAGNOSIS — N18.30 CKD (CHRONIC KIDNEY DISEASE) STAGE 3, GFR 30-59 ML/MIN: ICD-10-CM

## 2020-09-02 DIAGNOSIS — Z94.0 H/O KIDNEY TRANSPLANT: ICD-10-CM

## 2020-09-02 LAB — TACROLIMUS BLD-MCNC: 9.6 NG/ML (ref 5–15)

## 2020-09-02 PROCEDURE — 36415 COLL VENOUS BLD VENIPUNCTURE: CPT | Mod: HCNC

## 2020-09-02 PROCEDURE — 80197 ASSAY OF TACROLIMUS: CPT | Mod: HCNC

## 2020-09-05 ENCOUNTER — TELEPHONE (OUTPATIENT)
Dept: NEPHROLOGY | Facility: CLINIC | Age: 65
End: 2020-09-05

## 2020-09-05 DIAGNOSIS — Z79.899 IMMUNOSUPPRESSIVE MANAGEMENT ENCOUNTER FOLLOWING KIDNEY TRANSPLANT: ICD-10-CM

## 2020-09-05 DIAGNOSIS — Z94.0 IMMUNOSUPPRESSIVE MANAGEMENT ENCOUNTER FOLLOWING KIDNEY TRANSPLANT: ICD-10-CM

## 2020-09-05 DIAGNOSIS — Z94.0 H/O KIDNEY TRANSPLANT: Primary | ICD-10-CM

## 2020-09-05 NOTE — TELEPHONE ENCOUNTER
Pt's last two Prograf levels are too high, 7.9 and 8.6. It should be 4-6. Please instruct him to decrease Prograf from 2/2 to 2/1. (2mg am & 2mg pm to 2mg am & 1mg pm) Will repeat Prograf trough in one week, please schedule his Prograf level in morning 12 hours after his evening dose. Order in, thank you

## 2020-09-14 ENCOUNTER — LAB VISIT (OUTPATIENT)
Dept: LAB | Facility: HOSPITAL | Age: 65
End: 2020-09-14
Attending: INTERNAL MEDICINE
Payer: MEDICARE

## 2020-09-14 DIAGNOSIS — Z94.0 H/O KIDNEY TRANSPLANT: ICD-10-CM

## 2020-09-14 DIAGNOSIS — Z94.0 IMMUNOSUPPRESSIVE MANAGEMENT ENCOUNTER FOLLOWING KIDNEY TRANSPLANT: ICD-10-CM

## 2020-09-14 DIAGNOSIS — Z79.899 IMMUNOSUPPRESSIVE MANAGEMENT ENCOUNTER FOLLOWING KIDNEY TRANSPLANT: ICD-10-CM

## 2020-09-14 LAB — TACROLIMUS BLD-MCNC: 4.9 NG/ML (ref 5–15)

## 2020-09-14 PROCEDURE — 80197 ASSAY OF TACROLIMUS: CPT | Mod: HCNC

## 2020-09-14 PROCEDURE — 36415 COLL VENOUS BLD VENIPUNCTURE: CPT | Mod: HCNC

## 2020-09-17 ENCOUNTER — TELEPHONE (OUTPATIENT)
Dept: NEPHROLOGY | Facility: CLINIC | Age: 65
End: 2020-09-17

## 2020-10-08 ENCOUNTER — IMMUNIZATION (OUTPATIENT)
Dept: PHARMACY | Facility: CLINIC | Age: 65
End: 2020-10-08
Payer: MEDICARE

## 2020-10-08 ENCOUNTER — IMMUNIZATION (OUTPATIENT)
Dept: PHARMACY | Facility: CLINIC | Age: 65
End: 2020-10-08

## 2020-10-13 ENCOUNTER — PATIENT MESSAGE (OUTPATIENT)
Dept: CARDIOLOGY | Facility: CLINIC | Age: 65
End: 2020-10-13

## 2020-10-19 ENCOUNTER — OFFICE VISIT (OUTPATIENT)
Dept: INTERNAL MEDICINE | Facility: CLINIC | Age: 65
End: 2020-10-19
Payer: MEDICARE

## 2020-10-19 VITALS
BODY MASS INDEX: 31.18 KG/M2 | OXYGEN SATURATION: 99 % | SYSTOLIC BLOOD PRESSURE: 132 MMHG | DIASTOLIC BLOOD PRESSURE: 80 MMHG | WEIGHT: 235.25 LBS | HEART RATE: 59 BPM | HEIGHT: 73 IN

## 2020-10-19 DIAGNOSIS — I48.0 PAROXYSMAL ATRIAL FIBRILLATION: ICD-10-CM

## 2020-10-19 DIAGNOSIS — D84.821 IMMUNODEFICIENCY DUE TO TREATMENT WITH IMMUNOSUPPRESSIVE MEDICATION: ICD-10-CM

## 2020-10-19 DIAGNOSIS — D63.1 ANEMIA DUE TO STAGE 3 CHRONIC KIDNEY DISEASE, UNSPECIFIED WHETHER STAGE 3A OR 3B CKD: ICD-10-CM

## 2020-10-19 DIAGNOSIS — Z94.0 H/O KIDNEY TRANSPLANT: ICD-10-CM

## 2020-10-19 DIAGNOSIS — Z72.0 TOBACCO USE: ICD-10-CM

## 2020-10-19 DIAGNOSIS — E78.2 MIXED HYPERLIPIDEMIA: ICD-10-CM

## 2020-10-19 DIAGNOSIS — I50.32 CHRONIC DIASTOLIC HEART FAILURE: ICD-10-CM

## 2020-10-19 DIAGNOSIS — I25.10 CORONARY ARTERY DISEASE INVOLVING NATIVE CORONARY ARTERY OF NATIVE HEART WITHOUT ANGINA PECTORIS: ICD-10-CM

## 2020-10-19 DIAGNOSIS — N18.30 ANEMIA DUE TO STAGE 3 CHRONIC KIDNEY DISEASE, UNSPECIFIED WHETHER STAGE 3A OR 3B CKD: ICD-10-CM

## 2020-10-19 DIAGNOSIS — Z12.11 SCREEN FOR COLON CANCER: ICD-10-CM

## 2020-10-19 DIAGNOSIS — R91.8 MULTIPLE LUNG NODULES ON CT: ICD-10-CM

## 2020-10-19 DIAGNOSIS — Z79.01 LONG TERM (CURRENT) USE OF ANTICOAGULANTS: ICD-10-CM

## 2020-10-19 DIAGNOSIS — Z13.6 SCREENING FOR AAA (ABDOMINAL AORTIC ANEURYSM): ICD-10-CM

## 2020-10-19 DIAGNOSIS — Z79.899 IMMUNOSUPPRESSIVE MANAGEMENT ENCOUNTER FOLLOWING KIDNEY TRANSPLANT: ICD-10-CM

## 2020-10-19 DIAGNOSIS — I13.0 HYPERTENSIVE HEART AND RENAL DISEASE WITH RENAL FAILURE, STAGE 1 THROUGH STAGE 4 OR UNSPECIFIED CHRONIC KIDNEY DISEASE, WITH HEART FAILURE: Primary | ICD-10-CM

## 2020-10-19 DIAGNOSIS — Z94.0 IMMUNOSUPPRESSIVE MANAGEMENT ENCOUNTER FOLLOWING KIDNEY TRANSPLANT: ICD-10-CM

## 2020-10-19 DIAGNOSIS — Z79.899 IMMUNODEFICIENCY DUE TO TREATMENT WITH IMMUNOSUPPRESSIVE MEDICATION: ICD-10-CM

## 2020-10-19 DIAGNOSIS — J43.8 OTHER EMPHYSEMA: ICD-10-CM

## 2020-10-19 DIAGNOSIS — Z12.5 SCREENING PSA (PROSTATE SPECIFIC ANTIGEN): ICD-10-CM

## 2020-10-19 DIAGNOSIS — R73.03 PREDIABETES: ICD-10-CM

## 2020-10-19 PROBLEM — R63.4 WEIGHT LOSS: Status: RESOLVED | Noted: 2019-04-02 | Resolved: 2020-10-19

## 2020-10-19 PROBLEM — R07.9 CHEST PAIN: Status: RESOLVED | Noted: 2020-03-23 | Resolved: 2020-10-19

## 2020-10-19 PROCEDURE — 99999 PR PBB SHADOW E&M-EST. PATIENT-LVL V: ICD-10-PCS | Mod: PBBFAC,HCNC,, | Performed by: INTERNAL MEDICINE

## 2020-10-19 PROCEDURE — 99214 OFFICE O/P EST MOD 30 MIN: CPT | Mod: HCNC,S$GLB,, | Performed by: INTERNAL MEDICINE

## 2020-10-19 PROCEDURE — 3008F PR BODY MASS INDEX (BMI) DOCUMENTED: ICD-10-PCS | Mod: HCNC,CPTII,S$GLB, | Performed by: INTERNAL MEDICINE

## 2020-10-19 PROCEDURE — 99499 UNLISTED E&M SERVICE: CPT | Mod: S$GLB,,, | Performed by: INTERNAL MEDICINE

## 2020-10-19 PROCEDURE — 3079F PR MOST RECENT DIASTOLIC BLOOD PRESSURE 80-89 MM HG: ICD-10-PCS | Mod: HCNC,CPTII,S$GLB, | Performed by: INTERNAL MEDICINE

## 2020-10-19 PROCEDURE — 99499 RISK ADDL DX/OHS AUDIT: ICD-10-PCS | Mod: S$GLB,,, | Performed by: INTERNAL MEDICINE

## 2020-10-19 PROCEDURE — 1101F PT FALLS ASSESS-DOCD LE1/YR: CPT | Mod: HCNC,CPTII,S$GLB, | Performed by: INTERNAL MEDICINE

## 2020-10-19 PROCEDURE — 3075F SYST BP GE 130 - 139MM HG: CPT | Mod: HCNC,CPTII,S$GLB, | Performed by: INTERNAL MEDICINE

## 2020-10-19 PROCEDURE — 99214 PR OFFICE/OUTPT VISIT, EST, LEVL IV, 30-39 MIN: ICD-10-PCS | Mod: HCNC,S$GLB,, | Performed by: INTERNAL MEDICINE

## 2020-10-19 PROCEDURE — 99999 PR PBB SHADOW E&M-EST. PATIENT-LVL V: CPT | Mod: PBBFAC,HCNC,, | Performed by: INTERNAL MEDICINE

## 2020-10-19 PROCEDURE — 3075F PR MOST RECENT SYSTOLIC BLOOD PRESS GE 130-139MM HG: ICD-10-PCS | Mod: HCNC,CPTII,S$GLB, | Performed by: INTERNAL MEDICINE

## 2020-10-19 PROCEDURE — 1101F PR PT FALLS ASSESS DOC 0-1 FALLS W/OUT INJ PAST YR: ICD-10-PCS | Mod: HCNC,CPTII,S$GLB, | Performed by: INTERNAL MEDICINE

## 2020-10-19 PROCEDURE — 3079F DIAST BP 80-89 MM HG: CPT | Mod: HCNC,CPTII,S$GLB, | Performed by: INTERNAL MEDICINE

## 2020-10-19 PROCEDURE — 3008F BODY MASS INDEX DOCD: CPT | Mod: HCNC,CPTII,S$GLB, | Performed by: INTERNAL MEDICINE

## 2020-10-19 NOTE — PROGRESS NOTES
INTERNAL MEDICINE ESTABLISHED PATIENT VISIT NOTE    Subjective:     Chief Complaint: Follow-up  HTN, CKD     Patient ID: Ibrahima Phelps is a 64 y.o. male with HTN c CKD3 and hx renal transplant x2 in 2002 and 2005 and on immunosuppressive meds and secondary hyperPTH, HLD, A fib, hx SVT, CAD c chronic HF c preserved EF, COPD c baseline DAUGHERTY and tob use, thrombocytopenia now resolved, anemia of chronic disease, preDM, GERD, lung nodule on CT 2/2020 c plan to repeat screening CT in one year, last seen by me in June, here today for f/u.    Today s complaints.  Taking all meds as rx'ed.  Presents c his wife Patti.  Denies cp or sob.  Hx intermittent LE edema but none currently.  Denies blood in stools.    Past Medical History:  Past Medical History:   Diagnosis Date    (HFpEF) heart failure with preserved ejection fraction 9/25/2017    Atrial fibrillation     CAD (coronary artery disease)     CHF (congestive heart failure)     Chronic diastolic heart failure 4/8/2020    CKD (chronic kidney disease) stage 3, GFR 30-59 ml/min     Dr. Latif    Diverticulosis     Fever blister     Heart attack 2006    Hyperlipidemia     Hypertension     Immunodeficiency due to treatment with immunosuppressive medication     Keloid cicatrix     Metabolic bone disease     Pericarditis     S/P kidney transplant     Supraventricular tachycardia 06/2019    Thrombocytopenia     Thyroid disease     Unspecified disorder of kidney and ureter     Stage IIICKD       Home Medications:  Prior to Admission medications    Medication Sig Start Date End Date Taking? Authorizing Provider   apixaban (ELIQUIS) 5 mg Tab Take 1 tablet (5 mg total) by mouth 2 (two) times daily. 6/11/20  Yes Shagufta Blunt MD   aspirin (ADULT ASPIRIN EC LOW STRENGTH) 81 MG EC tablet Take 1 tablet by mouth Daily. 6/11/12  Yes Historical Provider   atorvastatin (LIPITOR) 10 MG tablet Take 1 tablet (10 mg total) by mouth once daily. 1/21/20  Yes Shagufta PENG  MD Merlene   calcium carbonate (OS-TRISH) 500 mg calcium (1,250 mg) tablet Two by mouth twice a day 1/21/20  Yes Emre Latif MD   carvediloL (COREG) 25 MG tablet Take 1 tablet (25 mg total) by mouth 2 (two) times daily with meals. 2/3/20 2/2/21 Yes Connie Magdaleno MD   doxazosin (CARDURA) 4 MG tablet Take 1 tablet (4 mg total) by mouth every 12 (twelve) hours. 1/21/20  Yes Emre Latif MD   famotidine (PEPCID) 20 MG tablet TAKE 1 TABLET BY MOUTH TWICE A DAY 10/6/20  Yes Emre Latif MD   fexofenadine (ALLEGRA) 60 MG tablet Take 1 tablet by mouth Twice daily as needed. 6/11/12  Yes Historical Provider   fluticasone propionate (FLONASE) 50 mcg/actuation nasal spray 1 spray (50 mcg total) by Each Nostril route once daily. 7/26/19  Yes Trish Worthy PA-C   furosemide (LASIX) 40 MG tablet Take 0.5 tablets (20 mg total) by mouth 2 (two) times daily. 1/21/20  Yes Emre Latif MD   gabapentin (NEURONTIN) 300 MG capsule Take 1 capsule (300 mg total) by mouth 2 (two) times daily. 2/3/20  Yes Connie Magdaleno MD   hydrALAZINE (APRESOLINE) 25 MG tablet Take 1 tablet (25 mg total) by mouth every 12 (twelve) hours. 8/12/20  Yes Emre Latif MD   KLOR-CON 10 10 mEq TbSR TAKE 1 TABLET (10 MEQ TOTAL) BY MOUTH ONCE DAILY. 4/22/20  Yes Kerri Norwood MD   multivitamin (THERAGRAN) per tablet Take 1 tablet by mouth Daily. 6/11/12  Yes Historical Provider   mycophenolate (CELLCEPT) 250 mg Cap Take 2 capsules (500 mg total) by mouth 2 (two) times daily. 1/28/20 1/22/21 Yes Emre Latif MD   nicotine (NICODERM CQ) 21 mg/24 hr Place 1 patch onto the skin once daily. 7/8/19  Yes Emre Latif MD   NIFEdipine (PROCARDIA-XL) 90 MG (OSM) 24 hr tablet Take 1 tablet (90 mg total) by mouth once daily. 1/21/20 1/20/21 Yes Shagufta Blunt MD   paricalcitoL (ZEMPLAR) 1 MCG capsule One po MWF 5/19/20  Yes Emre Latif MD   predniSONE (DELTASONE) 5 MG tablet Take 1 tablet (5 mg total) by mouth  every morning. 1/21/20  Yes Emre Latif MD   spironolactone (ALDACTONE) 25 MG tablet Take 1 tablet (25 mg total) by mouth once daily. 1/21/20  Yes Emre Latif MD   tacrolimus (PROGRAF) 1 MG Cap Take 2 capsules (2 mg total) by mouth every 12 (twelve) hours. TAKE 2 CAPSULES BY MOUTH EVERY MORNING AND 2 CAPSULES EVERY EVENING.  Z94.0 kidney transplant 1/28/20 1/27/21 Yes Emre Latif MD   vitamin D 1000 units Tab Take 370 mg by mouth 2 (two) times daily. 2 tablets BID   Yes Historical Provider   albuterol (ACCUNEB) 1.25 mg/3 mL Nebu Take 3 mLs (1.25 mg total) by nebulization every 6 (six) hours as needed. Rescue 10/1/19 9/30/20  Connie Magdaleno MD   colchicine (COLCRYS) 0.6 mg tablet One po BID up to three days prn gout flare 11/22/19   Emre Latif MD   famotidine (PEPCID) 20 MG tablet Take 1 tablet (20 mg total) by mouth 2 (two) times daily.  Patient not taking: Reported on 10/19/2020 10/6/20 10/6/21  Emre Latif MD   triamcinolone acetonide 0.1% (KENALOG) 0.1 % cream AAA bid to rash on ankles 2/26/19   Lyric Jovel MD       Allergies:  Review of patient's allergies indicates:   Allergen Reactions    Ace inhibitors Swelling    Verapamil Other (See Comments)     Other reaction(s): Unknown    Povidone-iodine Itching       Social History:  Social History     Tobacco Use    Smoking status: Current Some Day Smoker     Packs/day: 0.50     Years: 40.00     Pack years: 20.00     Types: Cigarettes    Smokeless tobacco: Never Used    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   Substance Use Topics    Alcohol use: No    Drug use: No        Review of Systems   Constitutional: Negative for appetite change, chills, fatigue, fever and unexpected weight change.   HENT: Negative for congestion, hearing loss and rhinorrhea.    Eyes: Negative for pain and visual disturbance.   Respiratory: Negative for cough, chest tightness, shortness  "of breath and wheezing.    Cardiovascular: Negative for chest pain, palpitations and leg swelling.   Gastrointestinal: Negative for abdominal distention and abdominal pain.   Endocrine: Negative for polydipsia and polyuria.   Genitourinary: Negative for decreased urine volume, discharge, dysuria and frequency.   Neurological: Negative for dizziness, weakness, numbness and headaches.   Psychiatric/Behavioral: Negative for behavioral problems and confusion.         Health Maintenance:     Immunizations:   Influenza 10/2020  TDap is up to date 3/2018  Pneumovax 12/2016, Prevnar 13 2/2017, pvax booster 2/2020  Shingrix 10/8/2020, repeat in 2-6 mos.     Cancer Screening:  Colonoscopy: is up to date. 9/2014, polyps, hyperplastic and tubular adenoma, rec f/u in 5 yrs, repeat csc attempted 3/2020 but had poor prep, advised to reschedule.  PSA now, Risks and benefits were discussed with patient.  AAA u/s now, Risks and benefits were discussed with patient.       Objective:   /80   Pulse (!) 59   Ht 6' 1" (1.854 m)   Wt 106.7 kg (235 lb 3.7 oz)   SpO2 99%   BMI 31.03 kg/m²        General: AAO x3, no apparent distress  HEENT: PERRL, OP clear  CV: RRR, no m/r/g  Pulm: Lungs CTAB, no crackles, no wheezes  Abd: s/NT/ND +BS  Extremities: no c/c/e    Labs:     Lab Results   Component Value Date    WBC 7.45 08/18/2020    HGB 11.6 (L) 08/18/2020    HCT 39.0 (L) 08/18/2020    MCV 84 08/18/2020     08/18/2020     Sodium   Date Value Ref Range Status   08/18/2020 142 136 - 145 mmol/L Final     Potassium   Date Value Ref Range Status   08/18/2020 4.1 3.5 - 5.1 mmol/L Final     Chloride   Date Value Ref Range Status   08/18/2020 108 95 - 110 mmol/L Final     CO2   Date Value Ref Range Status   08/18/2020 24 23 - 29 mmol/L Final     Glucose   Date Value Ref Range Status   08/18/2020 98 70 - 110 mg/dL Final     BUN, Bld   Date Value Ref Range Status   08/18/2020 22 8 - 23 mg/dL Final     Creatinine   Date Value Ref Range " Status   08/18/2020 2.0 (H) 0.5 - 1.4 mg/dL Final     Calcium   Date Value Ref Range Status   08/18/2020 8.4 (L) 8.7 - 10.5 mg/dL Final     Total Protein   Date Value Ref Range Status   08/18/2020 6.7 6.0 - 8.4 g/dL Final     Albumin   Date Value Ref Range Status   08/18/2020 3.6 3.5 - 5.2 g/dL Final   08/18/2020 3.6 3.5 - 5.2 g/dL Final     Total Bilirubin   Date Value Ref Range Status   08/18/2020 0.2 0.1 - 1.0 mg/dL Final     Comment:     For infants and newborns, interpretation of results should be based  on gestational age, weight and in agreement with clinical  observations.  Premature Infant recommended reference ranges:  Up to 24 hours.............<8.0 mg/dL  Up to 48 hours............<12.0 mg/dL  3-5 days..................<15.0 mg/dL  6-29 days.................<15.0 mg/dL       Alkaline Phosphatase   Date Value Ref Range Status   08/18/2020 63 55 - 135 U/L Final     AST   Date Value Ref Range Status   08/18/2020 20 10 - 40 U/L Final     ALT   Date Value Ref Range Status   08/18/2020 15 10 - 44 U/L Final     Anion Gap   Date Value Ref Range Status   08/18/2020 10 8 - 16 mmol/L Final     eGFR if    Date Value Ref Range Status   08/18/2020 39.3 (A) >60 mL/min/1.73 m^2 Final     eGFR if non    Date Value Ref Range Status   08/18/2020 34.0 (A) >60 mL/min/1.73 m^2 Final     Comment:     Calculation used to obtain the estimated glomerular filtration  rate (eGFR) is the CKD-EPI equation.        Lab Results   Component Value Date    HGBA1C 5.5 08/18/2020     Lab Results   Component Value Date    LDLCALC 59.4 (L) 06/17/2020     Lab Results   Component Value Date    TSH 1.690 06/17/2020         Assessment/Plan     Ibrahima was seen today for follow-up.    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  Sees Dr. Latif for transplant/renal issues, rec routine f/u in Dec c labs prior, pt to call Dr. Latif's office to see if additional labs can be ordered.  -     Comprehensive Metabolic Panel; Future    Paroxysmal atrial fibrillation  Long term (current) use of anticoagulants [V58.61]  No acute issues, rate and rhythm controlled  Now on Eliquis instead of coumadin.    Anemia due to stage 3 chronic kidney disease, unspecified whether stage 3a or 3b CKD  Stable, no issues  -     CBC auto differential; Future    Coronary artery disease involving native coronary artery of native heart without angina pectoris  Chronic diastolic heart failure  Asymptomatic  Euvolemic, on lasix    Mixed hyperlipidemia  Lab Results   Component Value Date    LDLCALC 59.4 (L) 06/17/2020     Well controlled, no issues  -     Lipid Panel; Future    Prediabetes  Lab Results   Component Value Date    HGBA1C 5.5 08/18/2020     Normalized on last check, can repeat annually    Other emphysema  Tobacco use  Patient counseled on the need to stop smoking.  Asymptomatic  -     CV AAA Screening; Future    Multiple lung nodules on CT  Repeat CT due in Feb, can order at f/u    Screening PSA (prostate specific antigen)  -     PSA, Screening; Future    Screening for AAA (abdominal aortic aneurysm)  -     CV AAA Screening; Future    Screen for colon cancer  Advised to schedule, pt requesting covid testing be done at [facility], pt can ask Endoscopy  -     Case request GI: COLONOSCOPY      HM as above  RTC in 4 mos, sooner if needed.    Connie Magdaleno MD  Department of Internal Medicine - [facility]  10/19/2020

## 2020-11-02 ENCOUNTER — PATIENT MESSAGE (OUTPATIENT)
Dept: NEPHROLOGY | Facility: CLINIC | Age: 65
End: 2020-11-02

## 2020-11-03 ENCOUNTER — PATIENT MESSAGE (OUTPATIENT)
Dept: NEPHROLOGY | Facility: CLINIC | Age: 65
End: 2020-11-03

## 2020-11-03 RX ORDER — FAMOTIDINE 20 MG/1
20 TABLET, FILM COATED ORAL 2 TIMES DAILY
Qty: 180 TABLET | Refills: 1 | Status: SHIPPED | OUTPATIENT
Start: 2020-11-03 | End: 2021-03-17 | Stop reason: SDUPTHER

## 2020-11-28 ENCOUNTER — OFFICE VISIT (OUTPATIENT)
Dept: URGENT CARE | Facility: CLINIC | Age: 65
End: 2020-11-28
Payer: MEDICARE

## 2020-11-28 VITALS
HEIGHT: 73 IN | SYSTOLIC BLOOD PRESSURE: 159 MMHG | OXYGEN SATURATION: 97 % | HEART RATE: 64 BPM | BODY MASS INDEX: 31.14 KG/M2 | DIASTOLIC BLOOD PRESSURE: 64 MMHG | TEMPERATURE: 98 F | WEIGHT: 235 LBS

## 2020-11-28 DIAGNOSIS — M25.521 RIGHT ELBOW PAIN: ICD-10-CM

## 2020-11-28 DIAGNOSIS — M25.421 ELBOW EFFUSION, RIGHT: Primary | ICD-10-CM

## 2020-11-28 PROCEDURE — 73080 X-RAY EXAM OF ELBOW: CPT | Mod: FY,RT,S$GLB, | Performed by: RADIOLOGY

## 2020-11-28 PROCEDURE — 99214 PR OFFICE/OUTPT VISIT, EST, LEVL IV, 30-39 MIN: ICD-10-PCS | Mod: S$GLB,,, | Performed by: NURSE PRACTITIONER

## 2020-11-28 PROCEDURE — 99214 OFFICE O/P EST MOD 30 MIN: CPT | Mod: S$GLB,,, | Performed by: NURSE PRACTITIONER

## 2020-11-28 PROCEDURE — 3008F BODY MASS INDEX DOCD: CPT | Mod: CPTII,S$GLB,, | Performed by: NURSE PRACTITIONER

## 2020-11-28 PROCEDURE — 3008F PR BODY MASS INDEX (BMI) DOCUMENTED: ICD-10-PCS | Mod: CPTII,S$GLB,, | Performed by: NURSE PRACTITIONER

## 2020-11-28 PROCEDURE — 73080 XR ELBOW COMPLETE 3 VIEW RIGHT: ICD-10-PCS | Mod: FY,RT,S$GLB, | Performed by: RADIOLOGY

## 2020-11-28 NOTE — PATIENT INSTRUCTIONS
General Discharge Instructions   If you were prescribed a narcotic or controlled medication, do not drive or operate heavy equipment or machinery while taking these medications.  If you were prescribed antibiotics, please take them to completion.  You must understand that you've received an Urgent Care treatment only and that you may be released before all your medical problems are known or treated. You, the patient, will arrange for follow up care as instructed.  Follow up with your PCP or specialty clinic as directed in the next 1-2 weeks if not improved or as needed.  You can call [phone] to schedule an appointment with the appropriate provider.  If your condition worsens we recommend that you receive another evaluation at the emergency room immediately or contact your primary medical clinics after hours call service to discuss your concerns.  Please return here or go to the Emergency Department for any concerns or worsening of condition.

## 2020-11-28 NOTE — PROGRESS NOTES
"Subjective:       Patient ID: Ibrahiam Phelps is a 65 y.o. male.    Vitals:  height is 6' 1" (1.854 m) and weight is 106.6 kg (235 lb). His temperature is 97.6 °F (36.4 °C). His blood pressure is 159/64 (abnormal) and his pulse is 64. His oxygen saturation is 97%.     Chief Complaint: Elbow Pain    Pt denies injury to the limb. Reports moderate pain to his right elbow. Pt reports that the pain radiates down to his finger tips and up to his shoulder. Pt states reports inability to move elbow due to pain.    Elbow Pain  This is a new problem. The current episode started in the past 7 days (thursday). The problem occurs constantly. The problem has been unchanged. Pertinent negatives include no abdominal pain, anorexia, arthralgias, change in bowel habit, chest pain, chills, congestion, coughing, diaphoresis, fatigue, fever, headaches, joint swelling, myalgias, nausea, neck pain, numbness, rash, sore throat, swollen glands, urinary symptoms, vertigo, visual change, vomiting or weakness. Nothing aggravates the symptoms. He has tried acetaminophen for the symptoms. The treatment provided mild relief.       Constitution: Negative for chills, sweating, fatigue and fever.   HENT: Negative for congestion and sore throat.    Neck: Negative for neck pain and painful lymph nodes.   Cardiovascular: Negative for chest pain and leg swelling.   Eyes: Negative for double vision and blurred vision.   Respiratory: Negative for cough and shortness of breath.    Gastrointestinal: Negative for abdominal pain, nausea, vomiting and diarrhea.   Genitourinary: Negative for dysuria, frequency and urgency.   Musculoskeletal: Positive for pain and arthritis. Negative for joint pain, joint swelling, gout, muscle cramps and muscle ache.   Skin: Negative for color change, pale and rash.   Allergic/Immunologic: Negative for seasonal allergies.   Neurological: Negative for dizziness, history of vertigo, light-headedness, passing out, headaches and " numbness.   Hematologic/Lymphatic: Negative for swollen lymph nodes, easy bruising/bleeding and history of blood clots. Does not bruise/bleed easily.   Psychiatric/Behavioral: Negative for nervous/anxious, sleep disturbance and depression. The patient is not nervous/anxious.        Objective:      Physical Exam   Constitutional: normal  HENT:   Head: Normocephalic.   Ears:   Right Ear: External ear normal.   Left Ear: External ear normal.   Cardiovascular: Normal pulses.   Pulmonary/Chest: Effort normal.   Abdominal: Normal appearance.   Musculoskeletal:         General: Swelling and tenderness present. No deformity or signs of injury.      Comments: Mild swelling and tenderness  Pain with extension  Denies injury   Neurological: He is alert.   Skin: Skin is warm.   Nursing note and vitals reviewed.      Xr Elbow Complete 3 View Right    Result Date: 11/28/2020  EXAMINATION: XR ELBOW COMPLETE 3 VIEW RIGHT CLINICAL HISTORY: . Pain in right elbow TECHNIQUE: AP, lateral, and oblique views of the right elbow were performed. COMPARISON: 04/13/2020 FINDINGS: Three views right elbow. There is displacement of the anterior and posterior elbow fat pads.  The anterior humeral line and radiocapitellar line are in appropriate orientation.  Degenerative changes are noted of the elbow.  There is vascular calcification.  No convincing acute displaced fracture dislocation of the elbow.     1. Joint effusion, nonspecific, no convincing radiographically apparent acute displaced fracture or dislocation of the elbow noting significant degenerative change.  Correlation is advised. Electronically signed by: Noe aHmmond MD Date:    11/28/2020 Time:    14:33  Assessment:       1. Elbow effusion, right    2. Right elbow pain        Plan:       Right joint effusion of elbow. No fracture or dislocation. Pt on anticoagulation therapy so NSAIDS contraindicated. Right elbow wrapped and compressed with ace bandage. ICE and Tylenol. Follow up  as needed.   Elbow effusion, right  -     BANDAGE ELASTIC 4IN ACE NS    Right elbow pain  -     XR ELBOW COMPLETE 3 VIEW RIGHT; Future; Expected date: 11/28/2020         Patient Instructions   General Discharge Instructions   If you were prescribed a narcotic or controlled medication, do not drive or operate heavy equipment or machinery while taking these medications.  If you were prescribed antibiotics, please take them to completion.  You must understand that you've received an Urgent Care treatment only and that you may be released before all your medical problems are known or treated. You, the patient, will arrange for follow up care as instructed.  Follow up with your PCP or specialty clinic as directed in the next 1-2 weeks if not improved or as needed.  You can call [phone] to schedule an appointment with the appropriate provider.  If your condition worsens we recommend that you receive another evaluation at the emergency room immediately or contact your primary medical clinics after hours call service to discuss your concerns.  Please return here or go to the Emergency Department for any concerns or worsening of condition.

## 2020-11-29 ENCOUNTER — HOSPITAL ENCOUNTER (EMERGENCY)
Facility: HOSPITAL | Age: 65
Discharge: HOME | End: 2020-11-29
Attending: EMERGENCY MEDICINE
Payer: MEDICARE

## 2020-11-29 VITALS
WEIGHT: 235 LBS | BODY MASS INDEX: 31.14 KG/M2 | HEIGHT: 73 IN | SYSTOLIC BLOOD PRESSURE: 147 MMHG | OXYGEN SATURATION: 97 % | HEART RATE: 70 BPM | RESPIRATION RATE: 18 BRPM | DIASTOLIC BLOOD PRESSURE: 67 MMHG | TEMPERATURE: 98 F

## 2020-11-29 DIAGNOSIS — M10.9 ACUTE GOUT OF RIGHT ELBOW, UNSPECIFIED CAUSE: Primary | ICD-10-CM

## 2020-11-29 PROBLEM — M25.521 RIGHT ELBOW PAIN: Status: ACTIVE | Noted: 2020-11-29

## 2020-11-29 LAB
ALBUMIN SERPL BCP-MCNC: 3.4 G/DL (ref 3.5–5.2)
ALP SERPL-CCNC: 60 U/L (ref 55–135)
ALT SERPL W/O P-5'-P-CCNC: 12 U/L (ref 10–44)
ANION GAP SERPL CALC-SCNC: 10 MMOL/L (ref 8–16)
APPEARANCE FLD: NORMAL
AST SERPL-CCNC: 17 U/L (ref 10–40)
BASOPHILS # BLD AUTO: 0.01 K/UL (ref 0–0.2)
BASOPHILS NFR BLD: 0.1 % (ref 0–1.9)
BILIRUB SERPL-MCNC: 0.4 MG/DL (ref 0.1–1)
BODY FLD TYPE: ABNORMAL
BODY FLD TYPE: NORMAL
BUN SERPL-MCNC: 20 MG/DL (ref 8–23)
CALCIUM SERPL-MCNC: 9.1 MG/DL (ref 8.7–10.5)
CHLORIDE SERPL-SCNC: 105 MMOL/L (ref 95–110)
CO2 SERPL-SCNC: 26 MMOL/L (ref 23–29)
COLOR FLD: YELLOW
CREAT SERPL-MCNC: 2.5 MG/DL (ref 0.5–1.4)
CRP SERPL-MCNC: 86.2 MG/L (ref 0–8.2)
CRYSTALS FLD MICRO: POSITIVE
CTP QC/QA: YES
DIFFERENTIAL METHOD: ABNORMAL
EOSINOPHIL # BLD AUTO: 0.3 K/UL (ref 0–0.5)
EOSINOPHIL NFR BLD: 4.5 % (ref 0–8)
ERYTHROCYTE [DISTWIDTH] IN BLOOD BY AUTOMATED COUNT: 14.6 % (ref 11.5–14.5)
ERYTHROCYTE [SEDIMENTATION RATE] IN BLOOD BY WESTERGREN METHOD: 83 MM/HR (ref 0–23)
EST. GFR  (AFRICAN AMERICAN): 29.8 ML/MIN/1.73 M^2
EST. GFR  (NON AFRICAN AMERICAN): 25.8 ML/MIN/1.73 M^2
GLUCOSE SERPL-MCNC: 107 MG/DL (ref 70–110)
GRAM STN SPEC: NORMAL
GRAM STN SPEC: NORMAL
HCT VFR BLD AUTO: 38.3 % (ref 40–54)
HGB BLD-MCNC: 11.7 G/DL (ref 14–18)
IMM GRANULOCYTES # BLD AUTO: 0.04 K/UL (ref 0–0.04)
IMM GRANULOCYTES NFR BLD AUTO: 0.5 % (ref 0–0.5)
LACTATE SERPL-SCNC: 1.2 MMOL/L (ref 0.5–2.2)
LYMPHOCYTES # BLD AUTO: 1.2 K/UL (ref 1–4.8)
LYMPHOCYTES NFR BLD: 16.1 % (ref 18–48)
LYMPHOCYTES NFR FLD MANUAL: 1 %
MCH RBC QN AUTO: 25.1 PG (ref 27–31)
MCHC RBC AUTO-ENTMCNC: 30.5 G/DL (ref 32–36)
MCV RBC AUTO: 82 FL (ref 82–98)
MONOCYTES # BLD AUTO: 1.1 K/UL (ref 0.3–1)
MONOCYTES NFR BLD: 13.9 % (ref 4–15)
MONOS+MACROS NFR FLD MANUAL: 6 %
NEUTROPHILS # BLD AUTO: 4.9 K/UL (ref 1.8–7.7)
NEUTROPHILS NFR BLD: 64.9 % (ref 38–73)
NEUTROPHILS NFR FLD MANUAL: 93 %
NRBC BLD-RTO: 0 /100 WBC
PLATELET # BLD AUTO: 146 K/UL (ref 150–350)
PMV BLD AUTO: ABNORMAL FL (ref 9.2–12.9)
POTASSIUM SERPL-SCNC: 4.6 MMOL/L (ref 3.5–5.1)
PROT SERPL-MCNC: 7.1 G/DL (ref 6–8.4)
RBC # BLD AUTO: 4.66 M/UL (ref 4.6–6.2)
SARS-COV-2 RDRP RESP QL NAA+PROBE: NEGATIVE
SODIUM SERPL-SCNC: 141 MMOL/L (ref 136–145)
WBC # BLD AUTO: 7.62 K/UL (ref 3.9–12.7)
WBC # FLD: NORMAL /CU MM

## 2020-11-29 PROCEDURE — 85025 COMPLETE CBC W/AUTO DIFF WBC: CPT | Mod: HCNC

## 2020-11-29 PROCEDURE — 89051 BODY FLUID CELL COUNT: CPT | Mod: HCNC

## 2020-11-29 PROCEDURE — 87205 SMEAR GRAM STAIN: CPT | Mod: HCNC

## 2020-11-29 PROCEDURE — 89060 EXAM SYNOVIAL FLUID CRYSTALS: CPT | Mod: HCNC

## 2020-11-29 PROCEDURE — 83605 ASSAY OF LACTIC ACID: CPT | Mod: HCNC

## 2020-11-29 PROCEDURE — 99284 EMERGENCY DEPT VISIT MOD MDM: CPT | Mod: 25,HCNC

## 2020-11-29 PROCEDURE — 87102 FUNGUS ISOLATION CULTURE: CPT | Mod: HCNC

## 2020-11-29 PROCEDURE — 63600175 PHARM REV CODE 636 W HCPCS: Mod: HCNC | Performed by: PHYSICIAN ASSISTANT

## 2020-11-29 PROCEDURE — 20605 DRAIN/INJ JOINT/BURSA W/O US: CPT | Mod: HCNC,RT

## 2020-11-29 PROCEDURE — 85652 RBC SED RATE AUTOMATED: CPT | Mod: HCNC

## 2020-11-29 PROCEDURE — 86140 C-REACTIVE PROTEIN: CPT | Mod: HCNC

## 2020-11-29 PROCEDURE — 87070 CULTURE OTHR SPECIMN AEROBIC: CPT | Mod: HCNC,59

## 2020-11-29 PROCEDURE — 87040 BLOOD CULTURE FOR BACTERIA: CPT | Mod: HCNC

## 2020-11-29 PROCEDURE — 99285 PR EMERGENCY DEPT VISIT,LEVEL V: ICD-10-PCS | Mod: HCNC,,, | Performed by: PHYSICIAN ASSISTANT

## 2020-11-29 PROCEDURE — 99285 EMERGENCY DEPT VISIT HI MDM: CPT | Mod: HCNC,,, | Performed by: PHYSICIAN ASSISTANT

## 2020-11-29 PROCEDURE — 87116 MYCOBACTERIA CULTURE: CPT | Mod: HCNC

## 2020-11-29 PROCEDURE — U0002 COVID-19 LAB TEST NON-CDC: HCPCS | Mod: HCNC | Performed by: EMERGENCY MEDICINE

## 2020-11-29 PROCEDURE — 80053 COMPREHEN METABOLIC PANEL: CPT | Mod: HCNC

## 2020-11-29 PROCEDURE — 87075 CULTR BACTERIA EXCEPT BLOOD: CPT | Mod: HCNC

## 2020-11-29 PROCEDURE — 87206 SMEAR FLUORESCENT/ACID STAI: CPT | Mod: HCNC

## 2020-11-29 PROCEDURE — 96374 THER/PROPH/DIAG INJ IV PUSH: CPT | Mod: HCNC

## 2020-11-29 RX ORDER — MORPHINE SULFATE 4 MG/ML
4 INJECTION, SOLUTION INTRAMUSCULAR; INTRAVENOUS
Status: COMPLETED | OUTPATIENT
Start: 2020-11-29 | End: 2020-11-29

## 2020-11-29 RX ORDER — TRAMADOL HYDROCHLORIDE 50 MG/1
50 TABLET ORAL EVERY 6 HOURS PRN
Qty: 12 TABLET | Refills: 0 | Status: SHIPPED | OUTPATIENT
Start: 2020-11-29 | End: 2020-12-02

## 2020-11-29 RX ADMIN — MORPHINE SULFATE 4 MG: 4 INJECTION, SOLUTION INTRAMUSCULAR; INTRAVENOUS at 03:11

## 2020-11-29 NOTE — HPI
Ibrahima Phelps is a RHD 65 y.o. male with PMH significant for HF, CAD (on Eliquis), Kidney transplant in 2005 (on Cellcept, Pred, and Tacro) presenting with R elbow pain. Atraumatic pain for 4 days over posterolateral aspect. Endorses bug bite that began to become more erythematous. ROM decreased of R elbow and pain w any ROM. Endorses fever of 100.4 when pain began, but has been afebrile since. Presented to urgent care yesterday, Xray was negative and sent home w/out any intervention. Presents to ER today bc it hasn't improved. Symptoms haven't worsened also. Patient denies numbness and tingling. Denies any other musculoskeletal pain or injuries. No known history of prior RUE injury or surgery. Walks w/out assisted devices at baseline. Endorses no hx of infected joints, but does endorse hx of gout in both ankles diagnosed clinically (unkown when last flare was) that doesn't feel like this.  Never had gout in elbow. Last ate had black coffee and honeybun at 10:45 this AM.  Patient is retired.

## 2020-11-29 NOTE — ED TRIAGE NOTES
"Pt reports to ED today w/ complaints of " bug bite" to right elbow. Pt reports going to UC yesterday and having X-rays taken and sent home w/ ace bandage. Pt reports pain w/ movement and difficulty moving extremity   "

## 2020-11-29 NOTE — SUBJECTIVE & OBJECTIVE
Past Medical History:   Diagnosis Date    (HFpEF) heart failure with preserved ejection fraction 9/25/2017    Atrial fibrillation     CAD (coronary artery disease)     CHF (congestive heart failure)     Chronic diastolic heart failure 4/8/2020    CKD (chronic kidney disease) stage 3, GFR 30-59 ml/min     Dr. Latif    Diverticulosis     Fever blister     Heart attack 2006    Hyperlipidemia     Hypertension     Immunodeficiency due to treatment with immunosuppressive medication     Keloid cicatrix     Metabolic bone disease     Pericarditis     S/P kidney transplant     Supraventricular tachycardia 06/2019    Thrombocytopenia     Thyroid disease     Unspecified disorder of kidney and ureter     Stage IIICKD       Past Surgical History:   Procedure Laterality Date    CARDIOVERSION  04/30/15    CHOLECYSTECTOMY      COLONOSCOPY  April 20, 2011    Diverticulosis, repeat recommended in 3 yrs., repeat colonoscopy 2014 revealed 2 polyps.  He should return in 5 years.    COLONOSCOPY N/A 3/13/2020    Procedure: COLONOSCOPY;  Surgeon: Chon Casper MD;  Location: [address]);  Service: Endoscopy;  Laterality: N/A;  ok to hold coumadin x5days-see telephone encounter 2/4/20-tb    CORONARY ANGIOPLASTY WITH STENT PLACEMENT  9/13/2006    KIDNEY TRANSPLANT  2005    PARATHYROIDECTOMY         Review of patient's allergies indicates:   Allergen Reactions    Ace inhibitors Swelling    Verapamil Other (See Comments)     Other reaction(s): Unknown    Povidone-iodine Itching       No current facility-administered medications for this encounter.      Current Outpatient Medications   Medication Sig    albuterol (ACCUNEB) 1.25 mg/3 mL Nebu Take 3 mLs (1.25 mg total) by nebulization every 6 (six) hours as needed. Rescue    apixaban (ELIQUIS) 5 mg Tab Take 1 tablet (5 mg total) by mouth 2 (two) times daily.    aspirin (ADULT ASPIRIN EC LOW STRENGTH) 81 MG EC tablet Take 1 tablet by mouth Daily.     atorvastatin (LIPITOR) 10 MG tablet Take 1 tablet (10 mg total) by mouth once daily.    calcium carbonate (OS-TRISH) 500 mg calcium (1,250 mg) tablet Two by mouth twice a day    carvediloL (COREG) 25 MG tablet Take 1 tablet (25 mg total) by mouth 2 (two) times daily with meals.    doxazosin (CARDURA) 4 MG tablet Take 1 tablet (4 mg total) by mouth every 12 (twelve) hours.    famotidine (PEPCID) 20 MG tablet Take 1 tablet (20 mg total) by mouth 2 (two) times daily.    fexofenadine (ALLEGRA) 60 MG tablet Take 1 tablet by mouth Twice daily as needed.    fluticasone propionate (FLONASE) 50 mcg/actuation nasal spray 1 spray (50 mcg total) by Each Nostril route once daily.    furosemide (LASIX) 40 MG tablet Take 0.5 tablets (20 mg total) by mouth 2 (two) times daily.    gabapentin (NEURONTIN) 300 MG capsule TAKE 1 CAPSULE BY MOUTH TWICE A DAY    hydrALAZINE (APRESOLINE) 25 MG tablet Take 1 tablet (25 mg total) by mouth every 12 (twelve) hours.    KLOR-CON 10 10 mEq TbSR TAKE 1 TABLET (10 MEQ TOTAL) BY MOUTH ONCE DAILY.    multivitamin (THERAGRAN) per tablet Take 1 tablet by mouth Daily.    mycophenolate (CELLCEPT) 250 mg Cap Take 2 capsules (500 mg total) by mouth 2 (two) times daily.    nicotine (NICODERM CQ) 21 mg/24 hr Place 1 patch onto the skin once daily.    NIFEdipine (PROCARDIA-XL) 90 MG (OSM) 24 hr tablet Take 1 tablet (90 mg total) by mouth once daily.    paricalcitoL (ZEMPLAR) 1 MCG capsule One po MWF    predniSONE (DELTASONE) 5 MG tablet Take 1 tablet (5 mg total) by mouth every morning.    spironolactone (ALDACTONE) 25 MG tablet Take 1 tablet (25 mg total) by mouth once daily.    tacrolimus (PROGRAF) 1 MG Cap Take 2 capsules (2 mg total) by mouth every 12 (twelve) hours. TAKE 2 CAPSULES BY MOUTH EVERY MORNING AND 2 CAPSULES EVERY EVENING.  Z94.0 kidney transplant    vitamin D 1000 units Tab Take 370 mg by mouth 2 (two) times daily. 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   Substance and Sexual Activity    Alcohol use: No    Drug use: No    Sexual activity: Yes     Partners: Female     ROS   Per ER 11/29/20  Objective:     Vital Signs (Most Recent):  Temp: 98.3 °F (36.8 °C) (11/29/20 1625)  Pulse: 70 (11/29/20 1625)  Resp: 18 (11/29/20 1625)  BP: (!) 147/67 (11/29/20 1625)  SpO2: 97 % (11/29/20 1625) Vital Signs (24h Range):  Temp:  [98.3 °F (36.8 °C)-99.1 °F (37.3 °C)] 98.3 °F (36.8 °C)  Pulse:  [70-71] 70  Resp:  [18] 18  SpO2:  [97 %-99 %] 97 %  BP: (147-149)/(63-67) 147/67     Weight: 106.6 kg (235 lb)  Height: 6' 1" (185.4 cm)  Body mass index is 31 kg/m².    No intake or output data in the 24 hours ending 11/29/20 1640    Ortho/SPM Exam   Gen:  No acute distress  CV:  Peripherally well-perfused.    Lungs:  Normal respiratory effort.  Head/Neck:  Normocephalic.  Atraumatic.    MSK:  RUE:  - No open wounds/abrasions  - Swelling, erythema and warmth over posterolateral elbow. This area is TTP and includes proximal forearm  - TTP over lateral and medial epicondyle of humerus  - NonTTP over olecranon or anterior elbow joint line  - NonTTP throughout rest of extremity  - ROM of elbow limited from  deg flex/ext 2/2 to pain  - No pain from 30 deg pronation to 30 deg supination, but pain over posterolateral elbow with extremes past that point  - AROM and PROM of the shoulder, wrist, and hand intact without pain  - Axillary/AIN/PIN/Radial/Median/Ulnar Nerves assessed in isolation without deficit  - SILT throughout  - Compartments " soft  - Radial artery palpated   - Capillary Refill <3s    All other joints (shoulder/elbow/wrist/hip/knee/ankle) were examined and had full ROM and were non-tender to palpation.    Significant Labs:   CBC:   Recent Labs   Lab 11/29/20  1443   WBC 7.62   HGB 11.7*   HCT 38.3*   *     CMP:   Recent Labs   Lab 11/29/20  1602      K 4.6      CO2 26      BUN 20   CREATININE 2.5*   CALCIUM 9.1   PROT 7.1   ALBUMIN 3.4*   BILITOT 0.4   ALKPHOS 60   AST 17   ALT 12   ANIONGAP 10   EGFRNONAA 25.8*     CRP:   Recent Labs   Lab 11/29/20  1602   CRP 86.2*      ESR: 83    All pertinent labs within the past 24 hours have been reviewed.    Significant Imaging: I have reviewed all pertinent imaging results/findings.   Xray right elbow without any acute fxs or dislocations or without any posterior/anterior fat pad sign

## 2020-11-29 NOTE — ASSESSMENT & PLAN NOTE
Ibrahima Phelps is a 65 y.o. male with complex PMH sig for immunosuppression presents w R elbow pain. ESR/CRP elevated, but no leukocytosis. Endorses fever 4 days ago, but afebrile here. Ortho consulted for septic elbow. Joint was aspiration at the bedside.   - Aspirate results: [id number] (93% segs), negative GS, and positive crystals, further labs pending and will f/u  - No concern for joint infection and patient has gout. Gout can mimic joint infection characteristics clinically.   - Offered patient interarticular CSI n patient deferred.   - [facility] medical treatment for gout (complicated 2/2 to renal transplant)  - No acute orthopedic intervention at this time.     Procedure Note: Right elbow aspiration  Patient was explained risks, benefits, and alternatives to treatment and verbalized consent to proceed. Time out was performed and patient name, , site, and procedure were confirmed. Skin was sterilely prepared with alcohol solution. 22 gauge needle on a 60 cc syringe was used for aspiration through lateral approach. Interarticular placement of needle was confirmed under C-arm. 7 cc of straw colored fluid collected. Fluid and cultures were obtained and sent for analysis. Aspiration site was covered with a bandaid.

## 2020-11-29 NOTE — ED PROVIDER NOTES
Encounter Date: 11/29/2020       History     Chief Complaint   Patient presents with    Elbow Pain     pain and swelling to r elbow [facility] at  [facility], kidney xplant 2005     Mr Phelps is a 65yoM who presents with painful right elbow swelling from:  Pertinent PMH history of kidney transplant on chronic immunosuppression, gout, osteoarthritis, CAD, heart failure, AFib, HLD, HTN.  Patient reports onset 4 days ago of painful swelling in right elbow with associated erythema and decreased range of motion.  Symptoms started about 2 days after he felt a bug bite on his right elbow.  He was seen in urgent care yesterday, where an x-ray was taken that was unremarkable, he was given an Ace wrap and sent home.  Reports continued significant pain despite over-the-counter pain medication at home.  The first day of swelling, he had fever of 100.4 at home.  Does have history of gout, though states this is much more painful, and he has never experienced gout in his elbows.  No trauma.  Denies chills, decreased appetite, nausea/vomiting, numbness or weakness in right upper extremity.  The patients available PMH, PSH, Social History, medications, allergies, and triage vital signs were reviewed just prior to their medical evaluation.  A ten point review of systems was completed and is negative except as documented above.  Patient denies any other acute medical complaint.    Please be advised this text was dictated with [employer] software and may contain errors due to translation.           Review of patient's allergies indicates:   Allergen Reactions    Ace inhibitors Swelling    Verapamil Other (See Comments)     Other reaction(s): Unknown    Povidone-iodine Itching     Past Medical History:   Diagnosis Date    (HFpEF) heart failure with preserved ejection fraction 9/25/2017    Atrial fibrillation     CAD (coronary artery disease)     CHF (congestive heart failure)     Chronic diastolic heart failure 4/8/2020    CKD (chronic  kidney disease) stage 3, GFR 30-59 ml/min     Dr. Latif    Diverticulosis     Fever blister     Heart attack 2006    Hyperlipidemia     Hypertension     Immunodeficiency due to treatment with immunosuppressive medication     Keloid cicatrix     Metabolic bone disease     Pericarditis     S/P kidney transplant     Supraventricular tachycardia 06/2019    Thrombocytopenia     Thyroid disease     Unspecified disorder of kidney and ureter     Stage IIICKD     Past Surgical History:   Procedure Laterality Date    CARDIOVERSION  04/30/15    CHOLECYSTECTOMY      COLONOSCOPY  April 20, 2011    Diverticulosis, repeat recommended in 3 yrs., repeat colonoscopy 2014 revealed 2 polyps.  He should return in 5 years.    COLONOSCOPY N/A 3/13/2020    Procedure: COLONOSCOPY;  Surgeon: Chon Casper MD;  Location: [facility] ([address] [place] [address]);  Service: Endoscopy;  Laterality: N/A;  ok to hold coumadin x5days-see telephone encounter 2/4/20-tb    CORONARY ANGIOPLASTY WITH STENT PLACEMENT  9/13/2006    KIDNEY TRANSPLANT  2005    PARATHYROIDECTOMY       Family History   Problem Relation Age of Onset    No Known Problems Sister     No Known Problems Brother     Thyroid disease Mother         s/p surgery    Heart disease Father         had pacemaker    No Known Problems Sister     Kidney failure Sister     Kidney disease Sister     No Known Problems Sister     Kidney disease Brother     Kidney failure Brother         s/p transplant    Diabetes Mellitus Neg Hx     Stroke Neg Hx     Heart attack Neg Hx     Cancer Neg Hx     Celiac disease Neg Hx     Cirrhosis Neg Hx     Colon cancer Neg Hx     Esophageal cancer Neg Hx     Inflammatory bowel disease Neg Hx     Rectal cancer Neg Hx     Stomach cancer Neg Hx     Ulcerative colitis Neg Hx     Liver disease Neg Hx     Liver cancer Neg Hx     Crohn's disease Neg Hx     Melanoma Neg Hx      Social History     Tobacco Use    Smoking status: Current Some  Day Smoker     Packs/day: 0.50     Years: 40.00     Pack years: 20.00     Types: Cigarettes    Smokeless tobacco: Never Used    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   Substance Use Topics    Alcohol use: No    Drug use: No     Review of Systems   Constitutional: Positive for fever (day 1 of symptoms). Negative for chills.   HENT: Negative for sore throat.    Respiratory: Negative for shortness of breath.    Cardiovascular: Negative for chest pain.   Gastrointestinal: Negative for abdominal pain and nausea.   Genitourinary: Negative for dysuria.   Musculoskeletal: Positive for arthralgias. Negative for back pain and myalgias.   Skin: Positive for color change. Negative for rash.   Neurological: Negative for weakness and numbness.   Hematological: Does not bruise/bleed easily.       Physical Exam     Initial Vitals [11/29/20 1320]   BP Pulse Resp Temp SpO2   (!) 149/63 71 18 99.1 °F (37.3 °C) 99 %      MAP       --         Physical Exam    Nursing note and vitals reviewed.  Constitutional: He appears well-developed and well-nourished. He is not diaphoretic. No distress.   HENT:   Head: Normocephalic and atraumatic.   Right Ear: External ear normal.   Left Ear: External ear normal.   Mouth/Throat: Oropharynx is clear and moist.   Eyes: Conjunctivae and EOM are normal. No scleral icterus.   Cardiovascular: Normal rate, regular rhythm and intact distal pulses.   Pulmonary/Chest: Breath sounds normal. No respiratory distress. He has no wheezes. He has no rhonchi. He has no rales.   Abdominal: Soft. Bowel sounds are normal. There is no abdominal tenderness. There is no rebound and no guarding.   Musculoskeletal: Normal range of motion. Tenderness and edema present.      Comments: Moderate right elbow effusion that is warm, overlying erythema, decreased range of motion secondary to severe pain  Pain reproducible with supination pronation of forearm  Distal RUE  NV intact   Neurological: He is alert and oriented to person, place, and time.   Skin: Skin is warm and dry. Capillary refill takes less than 2 seconds.   Psychiatric: He has a normal mood and affect. His behavior is normal. Judgment and thought content normal.         ED Course   Procedures  Labs Reviewed   CBC W/ AUTO DIFFERENTIAL - Abnormal; Notable for the following components:       Result Value    Hemoglobin 11.7 (*)     Hematocrit 38.3 (*)     MCH 25.1 (*)     MCHC 30.5 (*)     RDW 14.6 (*)     Platelets 146 (*)     Mono # 1.1 (*)     Lymph % 16.1 (*)     All other components within normal limits   SEDIMENTATION RATE - Abnormal; Notable for the following components:    Sed Rate 83 (*)     All other components within normal limits   BODY FLUID CRYSTAL - Abnormal; Notable for the following components:    Body Fluid Crystal Positive (*)     All other components within normal limits   COMPREHENSIVE METABOLIC PANEL - Abnormal; Notable for the following components:    Creatinine 2.5 (*)     Albumin 3.4 (*)     eGFR if  29.8 (*)     eGFR if non  25.8 (*)     All other components within normal limits   C-REACTIVE PROTEIN - Abnormal; Notable for the following components:    CRP 86.2 (*)     All other components within normal limits   CULTURE, BLOOD   CULTURE, BLOOD   AFB CULTURE & SMEAR    Narrative:     RIGHT ELBOW   CULTURE, FUNGUS    Narrative:     RIGHT ELBOW   GRAM STAIN    Narrative:     RIGHT ELBOW   CULTURE, ANAEROBIC    Narrative:     RIGHT ELBOW   CULTURE, AEROBIC  (SPECIFY SOURCE)    Narrative:     RIGHT ELBOW   WBC & DIFF,BODY FLUID    Narrative:     RIGHT ELBOW   LACTIC ACID, PLASMA   PATHOLOGIST REVIEW, BODY FLUID   SARS-COV-2 RDRP GENE    Narrative:     This test utilizes isothermal nucleic acid amplification   technology to detect the SARS-CoV-2 RdRp nucleic acid segment.   The analytical sensitivity (limit of detection) is 125 genome   equivalents/mL.   A POSITIVE  result implies infection with the SARS-CoV-2 virus;   the patient is presumed to be contagious.     A NEGATIVE result means that SARS-CoV-2 nucleic acids are not   present above the limit of detection. A NEGATIVE result should be   treated as presumptive. It does not rule out the possibility of   COVID-19 and should not be the sole basis for treatment decisions.   If COVID-19 is strongly suspected based on clinical and exposure   history, re-testing using an alternate molecular assay should be   considered.   This test is only for use under the Food and Drug   Administration s Emergency Use Authorization (EUA).   Commercial kits are provided by [facility].   Performance characteristics of the EUA have been independently   verified by [facility] Department of   Pathology and Laboratory Medicine.   _________________________________________________________________   The authorized Fact Sheet for Healthcare Providers and the authorized Fact   Sheet for Patients of the ID NOW COVID-19 are available on the FDA   website:     https://www.fda.gov/media/033834/download  https://www.fda.gov/media/219471/download              Imaging Results    None          Medical Decision Making:   History:   Old Medical Records: I decided to obtain old medical records.  Old Records Summarized: records from clinic visits and records from previous admission(s).  Initial Assessment:   Patient presents with painful right swelling of right elbow, chronic immunosuppression, fever during first day of symptoms.  VSS, afebrile  Differential Diagnosis:   DDx septic arthritis, gouty arthritis, OA. Physical exam and history taking lower clinical suspicion for septic shock, bursitis.  Clinical Tests:   Lab Tests: Ordered  Radiological Study: Ordered  ED Management:  Joint will require arthrocentesis, discussed with orthopedics who will see patient at bedside.  Signed out at shift change pending workup and orthopedic recommendations.  Patient agreed to plan of care and voiced understanding.    Melinda Bunch PA-C  12/02/2020    I discussed the following case, diagnosis and plan of care with attending physician.    Other:   I have discussed this case with another health care provider.              Attending Attestation:     Physician Attestation Statement for NP/PA:   I discussed this assessment and plan of this patient with the NP/PA, but I did not personally examine the patient. The face to face encounter was performed by the NP/PA.    Other NP/PA Attestation Additions:    History of Present Illness: The patient is a 65 y.o male with PMHx of chronic immunosuppression s/p kidney transplant who presents with worsening right elbow pain with associated swelling.                             Clinical Impression:     ICD-10-CM ICD-9-CM   1. Acute gout of right elbow, unspecified cause  M10.9 274.01                          ED Disposition Condition    Discharge Stable        ED Prescriptions     Medication Sig Dispense Start Date End Date Auth. Provider    traMADoL (ULTRAM) 50 mg tablet (Expires today) Take 1 tablet (50 mg total) by mouth every 6 (six) hours as needed for Pain. 12 tablet 11/29/2020 12/2/2020 Amy Briceño PA-C        Follow-up Information     Follow up With Specialties Details Why Contact Info    [facility] Emergency Medicine Go to  If symptoms worsen [address] [facility] [id number] [phone]    Connie Magdaleno MD Internal Medicine Schedule an appointment as soon as possible for a visit in 3 days  [address]  [place] [address]  [phone]                                         Melinda Bunch PA-C  12/02/20 0904

## 2020-11-30 ENCOUNTER — PATIENT MESSAGE (OUTPATIENT)
Dept: INTERNAL MEDICINE | Facility: CLINIC | Age: 65
End: 2020-11-30

## 2020-11-30 LAB — PATH INTERP FLD-IMP: NORMAL

## 2020-11-30 NOTE — DISCHARGE INSTRUCTIONS
You have a gout infection of your elbow.  Please take medications as needed for pain.  Do not take anti-inflammatories due to your kidney transplant.  Your creatinine is slightly elevated today.  Please contact your transplant coordinator for repeat labs in the next 3 days.  Drink plenty of fluids at home and maintain hydration.  Follow-up with your PCP or return to the emergency department for any new or worsening symptoms.

## 2020-11-30 NOTE — CONSULTS
[facility]  Orthopedics  Consult Note    Patient Name: Ibrahima Phelps  MRN: [id number]  Admission Date: 11/29/2020  Hospital Length of Stay: 0 days  Attending Provider: Martin Kramer MD  Primary Care Provider: Connie Magdaleno MD    Inpatient consult to Orthopedic Surgery  Consult performed by: Lukas Ricks MD  Consult ordered by: Melinda Bunch PA-C        Subjective:     Principal Problem:Right elbow pain    Chief Complaint:   Chief Complaint   Patient presents with    Elbow Pain     pain and swelling to r elbow seen at  [facility], kidney xplant 2005        HPI: Ibrahima Phelps is a RHD 65 y.o. male with PMH significant for HF, CAD (on Eliquis), Kidney transplant in 2005 (on Cellcept, Pred, and Tacro) presenting with R elbow pain. Atraumatic pain for 4 days over posterolateral aspect. Endorses bug bite that began to become more erythematous. ROM decreased of R elbow and pain w any ROM. Endorses fever of 100.4 when pain began, but has been afebrile since. Presented to urgent care yesterday, Xray was negative and sent home w/out any intervention. Presents to ER today bc it hasn't improved. Symptoms haven't worsened also. Patient denies numbness and tingling. Denies any other musculoskeletal pain or injuries. No known history of prior RUE injury or surgery. Walks w/out assisted devices at baseline. Endorses no hx of infected joints, but does endorse hx of gout in both ankles diagnosed clinically (unkown when last flare was) that doesn't feel like this.  Never had gout in elbow. Last ate had black coffee and honeybun at 10:45 this AM.  Patient is retired.     Past Medical History:   Diagnosis Date    (HFpEF) heart failure with preserved ejection fraction 9/25/2017    Atrial fibrillation     CAD (coronary artery disease)     CHF (congestive heart failure)     Chronic diastolic heart failure 4/8/2020    CKD (chronic kidney disease) stage 3, GFR 30-59 ml/min     Dr. Latif     Diverticulosis     Fever blister     Heart attack 2006    Hyperlipidemia     Hypertension     Immunodeficiency due to treatment with immunosuppressive medication     Keloid cicatrix     Metabolic bone disease     Pericarditis     S/P kidney transplant     Supraventricular tachycardia 06/2019    Thrombocytopenia     Thyroid disease     Unspecified disorder of kidney and ureter     Stage IIICKD       Past Surgical History:   Procedure Laterality Date    CARDIOVERSION  04/30/15    CHOLECYSTECTOMY      COLONOSCOPY  April 20, 2011    Diverticulosis, repeat recommended in 3 yrs., repeat colonoscopy 2014 revealed 2 polyps.  He should return in 5 years.    COLONOSCOPY N/A 3/13/2020    Procedure: COLONOSCOPY;  Surgeon: Chon Casper MD;  Location: [facility] ([address] [place] [address]);  Service: Endoscopy;  Laterality: N/A;  ok to hold coumadin x5days-see telephone encounter 2/4/20-tb    CORONARY ANGIOPLASTY WITH STENT PLACEMENT  9/13/2006    KIDNEY TRANSPLANT  2005    PARATHYROIDECTOMY         Review of patient's allergies indicates:   Allergen Reactions    Ace inhibitors Swelling    Verapamil Other (See Comments)     Other reaction(s): Unknown    Povidone-iodine Itching       No current facility-administered medications for this encounter.      Current Outpatient Medications   Medication Sig    albuterol (ACCUNEB) 1.25 mg/3 mL Nebu Take 3 mLs (1.25 mg total) by nebulization every 6 (six) hours as needed. Rescue    apixaban (ELIQUIS) 5 mg Tab Take 1 tablet (5 mg total) by mouth 2 (two) times daily.    aspirin (ADULT ASPIRIN EC LOW STRENGTH) 81 MG EC tablet Take 1 tablet by mouth Daily.    atorvastatin (LIPITOR) 10 MG tablet Take 1 tablet (10 mg total) by mouth once daily.    calcium carbonate (OS-TRISH) 500 mg calcium (1,250 mg) tablet Two by mouth twice a day    carvediloL (COREG) 25 MG tablet Take 1 tablet (25 mg total) by mouth 2 (two) times daily with meals.    doxazosin (CARDURA) 4 MG tablet Take 1 tablet  (4 mg total) by mouth every 12 (twelve) hours.    famotidine (PEPCID) 20 MG tablet Take 1 tablet (20 mg total) by mouth 2 (two) times daily.    fexofenadine (ALLEGRA) 60 MG tablet Take 1 tablet by mouth Twice daily as needed.    fluticasone propionate (FLONASE) 50 mcg/actuation nasal spray 1 spray (50 mcg total) by Each Nostril route once daily.    furosemide (LASIX) 40 MG tablet Take 0.5 tablets (20 mg total) by mouth 2 (two) times daily.    gabapentin (NEURONTIN) 300 MG capsule TAKE 1 CAPSULE BY MOUTH TWICE A DAY    hydrALAZINE (APRESOLINE) 25 MG tablet Take 1 tablet (25 mg total) by mouth every 12 (twelve) hours.    KLOR-CON 10 10 mEq TbSR TAKE 1 TABLET (10 MEQ TOTAL) BY MOUTH ONCE DAILY.    multivitamin (THERAGRAN) per tablet Take 1 tablet by mouth Daily.    mycophenolate (CELLCEPT) 250 mg Cap Take 2 capsules (500 mg total) by mouth 2 (two) times daily.    nicotine (NICODERM CQ) 21 mg/24 hr Place 1 patch onto the skin once daily.    NIFEdipine (PROCARDIA-XL) 90 MG (OSM) 24 hr tablet Take 1 tablet (90 mg total) by mouth once daily.    paricalcitoL (ZEMPLAR) 1 MCG capsule One po MWF    predniSONE (DELTASONE) 5 MG tablet Take 1 tablet (5 mg total) by mouth every morning.    spironolactone (ALDACTONE) 25 MG tablet Take 1 tablet (25 mg total) by mouth once daily.    tacrolimus (PROGRAF) 1 MG Cap Take 2 capsules (2 mg total) by mouth every 12 (twelve) hours. TAKE 2 CAPSULES BY MOUTH EVERY MORNING AND 2 CAPSULES EVERY EVENING.  Z94.0 kidney transplant    vitamin D 1000 units Tab Take 370 mg by mouth 2 (two) times daily. 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   Substance and Sexual Activity    Alcohol use: No    Drug use: No    Sexual activity: Yes     Partners: Female     ROS   Per ER 11/29/20  Objective:     Vital Signs (Most Recent):  Temp: 98.3 °F (36.8 °C) (11/29/20 1625)  Pulse: 70 (11/29/20 1625)  Resp: 18 (11/29/20 1625)  BP: (!) 147/67 (11/29/20 1625)  SpO2: 97 % (11/29/20 1625) Vital Signs (24h Range):  Temp:  [98.3 °F (36.8 °C)-99.1 °F (37.3 °C)] 98.3 °F (36.8 °C)  Pulse:  [70-71] 70  Resp:  [18] 18  SpO2:  [97 %-99 %] 97 %  BP: (147-149)/(63-67) 147/67     Weight: 106.6 kg (235 lb)  Height: 6' 1" (185.4 cm)  Body mass index is 31 kg/m².    No intake or output data in the 24 hours ending 11/29/20 1640    Ortho/SPM Exam   Gen:  No acute distress  CV:  Peripherally well-perfused.    Lungs:  Normal respiratory effort.  Head/Neck:  Normocephalic.  Atraumatic.    MSK:  RUE:  - No open wounds/abrasions  - Swelling, erythema and warmth over posterolateral elbow. This area is TTP and includes proximal forearm  - TTP over lateral and medial epicondyle of humerus  - NonTTP over olecranon or anterior elbow joint line  - NonTTP throughout rest of extremity  - ROM of elbow limited from  deg flex/ext 2/2 to pain  - No pain from 30 deg pronation to 30 deg supination, but pain over posterolateral elbow with extremes past that point  - AROM and PROM of the shoulder, wrist, and hand intact without pain  - Axillary/AIN/PIN/Radial/Median/Ulnar Nerves assessed in isolation without deficit  - SILT throughout  - Compartments soft  - Radial artery palpated   - Capillary Refill <3s    All other joints (shoulder/elbow/wrist/hip/knee/ankle) were examined and had full ROM and were non-tender to palpation.    Significant Labs:   CBC:   Recent Labs   Lab 11/29/20  1443   WBC 7.62   HGB 11.7*   HCT 38.3*   *     CMP:   Recent Labs   Lab " 20  1602      K 4.6      CO2 26      BUN 20   CREATININE 2.5*   CALCIUM 9.1   PROT 7.1   ALBUMIN 3.4*   BILITOT 0.4   ALKPHOS 60   AST 17   ALT 12   ANIONGAP 10   EGFRNONAA 25.8*     CRP:   Recent Labs   Lab 20  1602   CRP 86.2*      ESR: 83    All pertinent labs within the past 24 hours have been reviewed.    Significant Imaging: I have reviewed all pertinent imaging results/findings.   Xray right elbow without any acute fxs or dislocations or without any posterior/anterior fat pad sign    Assessment/Plan:     * Right elbow pain  Ibrahima Phelps is a 65 y.o. male with complex PMH sig for immunosuppression presents w R elbow pain. ESR/CRP elevated, but no leukocytosis. Endorses fever 4 days ago, but afebrile here. Ortho consulted for septic elbow. Joint was aspiration at the bedside.   - Aspirate results: [id number] (93% segs), negative GS, and positive crystals, further labs pending and will f/u  - No concern for joint infection and patient has gout. Gout can mimic joint infection characteristics clinically.   - Offered patient interarticular CSI n patient deferred.   - [facility] medical treatment for gout (complicated 2/2 to renal transplant)  - No acute orthopedic intervention at this time.     Procedure Note: Right elbow aspiration  Patient was explained risks, benefits, and alternatives to treatment and verbalized consent to proceed. Time out was performed and patient name, , site, and procedure were confirmed. Skin was sterilely prepared with alcohol solution. 22 gauge needle on a 60 cc syringe was used for aspiration through lateral approach. Interarticular placement of needle was confirmed under C-arm. 7 cc of straw colored fluid collected. Fluid and cultures were obtained and sent for analysis. Aspiration site was covered with a bandaid.          Lukas Ricks MD  Orthopedics  [facility]

## 2020-11-30 NOTE — PROVIDER PROGRESS NOTES - EMERGENCY DEPT.
Encounter Date: 11/29/2020    ED Physician Progress Notes           ED Physician Hand-off Note:    ED Course: I assumed care of patient from off-going ED physician team. Briefly, Patient is a 65-year-old male with past medical history of kidney transplant 2005 who presents to the ED with complaints of right elbow pain and swelling that began four days ago. Reports initially having a fever when swelling started but has been afebrile since. Has a history of gout however never with gout to his elbow.     At the time of signout plan was pending orthopedic consult for septic arthritis.    Medications given in the ED:    Medications   morphine injection 4 mg (4 mg Intravenous Given 11/29/20 1531)       Imaging Results    None         Disposition: discharge   Orthopedic surgery has evaluated the patient and performed aspiration. Labs consistent with gout. No evidence of septic joint. No recommendations for admission, antibiotics.   Patient denies any pain at this time. Creatinine slightly elevated from baseline. Recommend oral hydration and for patient to contact transplant coordinator to schedule repeat labs within 3 days. Will discharge with tramadol for pain. Avoid NSAIDs. Strict return precautions given. Patient voices understanding. All questions answered.   Patient comfortable with discharge. Patient counseled regarding exam, results, diagnosis, treatment, and plan.    Impression:     Final diagnoses:  [M10.9] Acute gout of right elbow, unspecified cause (Primary)

## 2020-12-01 ENCOUNTER — TELEPHONE (OUTPATIENT)
Dept: ENDOSCOPY | Facility: HOSPITAL | Age: 65
End: 2020-12-01

## 2020-12-02 ENCOUNTER — TELEPHONE (OUTPATIENT)
Dept: ENDOSCOPY | Facility: HOSPITAL | Age: 65
End: 2020-12-02

## 2020-12-02 NOTE — TELEPHONE ENCOUNTER
Shagufta Blunt MD Yesterday (3:55 PM)        Eliquis can be held for 48 hours prior to the planned procedure and restarted asap.         Documentation        You routed conversation to Shagufta Blunt MD Yesterday (1:25 PM)      You Yesterday (1:25 PM)        Can Pt hold Eliquis x 2 days prior to colonoscopy  per endoscopy protocol?   Please advise.     Thank you            Documentation

## 2020-12-03 LAB — BACTERIA SPEC AEROBE CULT: NO GROWTH

## 2020-12-04 LAB
BACTERIA BLD CULT: NORMAL
BACTERIA BLD CULT: NORMAL
BACTERIA SPEC ANAEROBE CULT: NORMAL

## 2020-12-09 ENCOUNTER — HOSPITAL ENCOUNTER (OUTPATIENT)
Dept: CARDIOLOGY | Facility: HOSPITAL | Age: 65
Discharge: HOME | End: 2020-12-09
Attending: INTERNAL MEDICINE
Payer: MEDICARE

## 2020-12-09 ENCOUNTER — IMMUNIZATION (OUTPATIENT)
Dept: PHARMACY | Facility: CLINIC | Age: 65
End: 2020-12-09
Payer: MEDICARE

## 2020-12-09 DIAGNOSIS — Z72.0 TOBACCO USE: ICD-10-CM

## 2020-12-09 DIAGNOSIS — Z13.6 SCREENING FOR AAA (ABDOMINAL AORTIC ANEURYSM): ICD-10-CM

## 2020-12-09 LAB
ABDOMINAL IMA AP: 1.78 CM
ABDOMINAL IMA ED VEL: 0 CM/S
ABDOMINAL IMA PS VEL: 104 CM/S
ABDOMINAL IMA TRANS: 1.73 CM
ABDOMINAL INFRARENAL AORTA AP: 1.82 CM
ABDOMINAL INFRARENAL AORTA ED VEL: 0 CM/S
ABDOMINAL INFRARENAL AORTA PS VEL: 73 CM/S
ABDOMINAL INFRARENAL AORTA TRANS: 1.82 CM
ABDOMINAL JUXTARENAL AORTA AP: 1.96 CM
ABDOMINAL JUXTARENAL AORTA ED VEL: 0 CM/S
ABDOMINAL JUXTARENAL AORTA PS VEL: 58 CM/S
ABDOMINAL JUXTARENAL AORTA TRANS: 2.14 CM
ABDOMINAL LT COM ILIAC AP: 1 CM
ABDOMINAL LT COM ILIAC TRANS: 1.3 CM
ABDOMINAL LT COM ILIAC VEL: 78 CM/S
ABDOMINAL LT COM ILLIAC ED VEL: 0 CM/S
ABDOMINAL RT COM ILIAC AP: 1 CM
ABDOMINAL RT COM ILIAC TRANS: 1 CM
ABDOMINAL RT COM ILIAC VEL: 95 CM/S
ABDOMINAL RT COM ILLIAC ED VEL: 0 CM/S
ABDOMINAL SUPRARENAL AORTA AP: 2.59 CM
ABDOMINAL SUPRARENAL AORTA ED VEL: 0 CM/S
ABDOMINAL SUPRARENAL AORTA PS VEL: 69 CM/S
ABDOMINAL SUPRARENAL AORTA TRANS: 2.19 CM

## 2020-12-09 PROCEDURE — 93978 VASCULAR STUDY: CPT | Mod: 26,HCNC,, | Performed by: INTERNAL MEDICINE

## 2020-12-09 PROCEDURE — 93978 CV US ABDOMINAL AORTA EVALUATION (CUPID ONLY): ICD-10-PCS | Mod: 26,HCNC,, | Performed by: INTERNAL MEDICINE

## 2020-12-09 PROCEDURE — 93978 VASCULAR STUDY: CPT | Mod: HCNC

## 2020-12-11 ENCOUNTER — PATIENT MESSAGE (OUTPATIENT)
Dept: INTERNAL MEDICINE | Facility: CLINIC | Age: 65
End: 2020-12-11

## 2020-12-11 ENCOUNTER — PATIENT MESSAGE (OUTPATIENT)
Dept: NEPHROLOGY | Facility: CLINIC | Age: 65
End: 2020-12-11

## 2020-12-11 DIAGNOSIS — R51.9 HEAD ACHE: ICD-10-CM

## 2020-12-11 RX ORDER — AZITHROMYCIN 250 MG/1
TABLET, FILM COATED ORAL
Qty: 6 TABLET | Refills: 0 | Status: SHIPPED | OUTPATIENT
Start: 2020-12-11 | End: 2020-12-16

## 2020-12-13 ENCOUNTER — TELEPHONE (OUTPATIENT)
Dept: NEPHROLOGY | Facility: CLINIC | Age: 65
End: 2020-12-13

## 2020-12-13 ENCOUNTER — PATIENT MESSAGE (OUTPATIENT)
Dept: NEPHROLOGY | Facility: CLINIC | Age: 65
End: 2020-12-13

## 2020-12-13 DIAGNOSIS — N18.32 STAGE 3B CHRONIC KIDNEY DISEASE: ICD-10-CM

## 2020-12-13 DIAGNOSIS — Z79.899 IMMUNOSUPPRESSIVE MANAGEMENT ENCOUNTER FOLLOWING KIDNEY TRANSPLANT: ICD-10-CM

## 2020-12-13 DIAGNOSIS — Z94.0 IMMUNOSUPPRESSIVE MANAGEMENT ENCOUNTER FOLLOWING KIDNEY TRANSPLANT: ICD-10-CM

## 2020-12-13 DIAGNOSIS — Z94.0 H/O KIDNEY TRANSPLANT: Primary | ICD-10-CM

## 2020-12-13 NOTE — TELEPHONE ENCOUNTER
Please reschedule pt--needs labs for his appt. Orders in to include 12 hour trough Prograf level.  Thank you

## 2020-12-15 ENCOUNTER — PATIENT MESSAGE (OUTPATIENT)
Dept: OTHER | Facility: OTHER | Age: 65
End: 2020-12-15

## 2020-12-23 ENCOUNTER — TELEPHONE (OUTPATIENT)
Dept: ORTHOPEDICS | Facility: CLINIC | Age: 65
End: 2020-12-23

## 2020-12-23 NOTE — TELEPHONE ENCOUNTER
----- Message from Abhinav Phelps sent at 12/23/2020  8:19 AM CST -----  Regarding: My Chart Request  Message    Appointment Request From: Ibrahima Phelps    With Provider: dr. Adilia Fernandez    Preferred Date Range: 12/23/2020 - 12/28/2020    Preferred Times: Any Time    Reason for visit: Severe Back Pain    Comments:  The severe pain in my back

## 2020-12-28 LAB — FUNGUS SPEC CULT: NORMAL

## 2021-01-05 ENCOUNTER — LAB VISIT (OUTPATIENT)
Dept: LAB | Facility: HOSPITAL | Age: 66
End: 2021-01-05
Attending: INTERNAL MEDICINE
Payer: MEDICARE

## 2021-01-05 DIAGNOSIS — Z94.0 IMMUNOSUPPRESSIVE MANAGEMENT ENCOUNTER FOLLOWING KIDNEY TRANSPLANT: ICD-10-CM

## 2021-01-05 DIAGNOSIS — Z79.899 IMMUNOSUPPRESSIVE MANAGEMENT ENCOUNTER FOLLOWING KIDNEY TRANSPLANT: ICD-10-CM

## 2021-01-05 DIAGNOSIS — N18.32 STAGE 3B CHRONIC KIDNEY DISEASE: ICD-10-CM

## 2021-01-05 DIAGNOSIS — Z94.0 H/O KIDNEY TRANSPLANT: ICD-10-CM

## 2021-01-05 LAB
ALBUMIN SERPL BCP-MCNC: 3.7 G/DL (ref 3.5–5.2)
ALBUMIN SERPL BCP-MCNC: 3.7 G/DL (ref 3.5–5.2)
ALP SERPL-CCNC: 75 U/L (ref 55–135)
ALT SERPL W/O P-5'-P-CCNC: 14 U/L (ref 10–44)
ANION GAP SERPL CALC-SCNC: 12 MMOL/L (ref 8–16)
AST SERPL-CCNC: 23 U/L (ref 10–40)
BASOPHILS # BLD AUTO: 0.02 K/UL (ref 0–0.2)
BASOPHILS NFR BLD: 0.3 % (ref 0–1.9)
BILIRUB DIRECT SERPL-MCNC: 0.2 MG/DL (ref 0.1–0.3)
BILIRUB SERPL-MCNC: 0.3 MG/DL (ref 0.1–1)
BUN SERPL-MCNC: 30 MG/DL (ref 8–23)
CALCIUM SERPL-MCNC: 9.2 MG/DL (ref 8.7–10.5)
CHLORIDE SERPL-SCNC: 104 MMOL/L (ref 95–110)
CO2 SERPL-SCNC: 24 MMOL/L (ref 23–29)
CREAT SERPL-MCNC: 2.5 MG/DL (ref 0.5–1.4)
DIFFERENTIAL METHOD: ABNORMAL
EOSINOPHIL # BLD AUTO: 0.5 K/UL (ref 0–0.5)
EOSINOPHIL NFR BLD: 7.1 % (ref 0–8)
ERYTHROCYTE [DISTWIDTH] IN BLOOD BY AUTOMATED COUNT: 14.5 % (ref 11.5–14.5)
EST. GFR  (AFRICAN AMERICAN): 29.8 ML/MIN/1.73 M^2
EST. GFR  (NON AFRICAN AMERICAN): 25.8 ML/MIN/1.73 M^2
GLUCOSE SERPL-MCNC: 100 MG/DL (ref 70–110)
HCT VFR BLD AUTO: 40.1 % (ref 40–54)
HGB BLD-MCNC: 11.8 G/DL (ref 14–18)
IMM GRANULOCYTES # BLD AUTO: 0.04 K/UL (ref 0–0.04)
IMM GRANULOCYTES NFR BLD AUTO: 0.6 % (ref 0–0.5)
LYMPHOCYTES # BLD AUTO: 1.6 K/UL (ref 1–4.8)
LYMPHOCYTES NFR BLD: 21.5 % (ref 18–48)
MAGNESIUM SERPL-MCNC: 1.8 MG/DL (ref 1.6–2.6)
MCH RBC QN AUTO: 24.6 PG (ref 27–31)
MCHC RBC AUTO-ENTMCNC: 29.4 G/DL (ref 32–36)
MCV RBC AUTO: 84 FL (ref 82–98)
MONOCYTES # BLD AUTO: 0.8 K/UL (ref 0.3–1)
MONOCYTES NFR BLD: 11.1 % (ref 4–15)
NEUTROPHILS # BLD AUTO: 4.3 K/UL (ref 1.8–7.7)
NEUTROPHILS NFR BLD: 59.4 % (ref 38–73)
NRBC BLD-RTO: 0 /100 WBC
PHOSPHATE SERPL-MCNC: 4.9 MG/DL (ref 2.7–4.5)
PLATELET # BLD AUTO: 154 K/UL (ref 150–350)
PMV BLD AUTO: 13.5 FL (ref 9.2–12.9)
POTASSIUM SERPL-SCNC: 4.4 MMOL/L (ref 3.5–5.1)
PROT SERPL-MCNC: 7.1 G/DL (ref 6–8.4)
PTH-INTACT SERPL-MCNC: 23 PG/ML (ref 9–77)
RBC # BLD AUTO: 4.79 M/UL (ref 4.6–6.2)
SODIUM SERPL-SCNC: 140 MMOL/L (ref 136–145)
TACROLIMUS BLD-MCNC: 5.4 NG/ML (ref 5–15)
WBC # BLD AUTO: 7.22 K/UL (ref 3.9–12.7)

## 2021-01-05 PROCEDURE — 83970 ASSAY OF PARATHORMONE: CPT

## 2021-01-05 PROCEDURE — 36415 COLL VENOUS BLD VENIPUNCTURE: CPT

## 2021-01-05 PROCEDURE — 85025 COMPLETE CBC W/AUTO DIFF WBC: CPT

## 2021-01-05 PROCEDURE — 80197 ASSAY OF TACROLIMUS: CPT

## 2021-01-05 PROCEDURE — 84450 TRANSFERASE (AST) (SGOT): CPT

## 2021-01-05 PROCEDURE — 80069 RENAL FUNCTION PANEL: CPT

## 2021-01-05 PROCEDURE — 83735 ASSAY OF MAGNESIUM: CPT

## 2021-01-05 PROCEDURE — 84075 ASSAY ALKALINE PHOSPHATASE: CPT

## 2021-01-07 ENCOUNTER — HOSPITAL ENCOUNTER (EMERGENCY)
Facility: HOSPITAL | Age: 66
Discharge: HOME | End: 2021-01-07
Attending: EMERGENCY MEDICINE
Payer: MEDICARE

## 2021-01-07 VITALS
RESPIRATION RATE: 16 BRPM | HEIGHT: 73 IN | HEART RATE: 55 BPM | TEMPERATURE: 98 F | SYSTOLIC BLOOD PRESSURE: 176 MMHG | DIASTOLIC BLOOD PRESSURE: 88 MMHG | BODY MASS INDEX: 31.41 KG/M2 | OXYGEN SATURATION: 98 % | WEIGHT: 237 LBS

## 2021-01-07 DIAGNOSIS — M54.32 SCIATICA OF LEFT SIDE: Primary | ICD-10-CM

## 2021-01-07 DIAGNOSIS — M54.31 SCIATICA OF RIGHT SIDE: ICD-10-CM

## 2021-01-07 LAB
ANION GAP SERPL CALC-SCNC: 11 MMOL/L (ref 8–16)
BASOPHILS # BLD AUTO: 0.02 K/UL (ref 0–0.2)
BASOPHILS NFR BLD: 0.3 % (ref 0–1.9)
BILIRUB UR QL STRIP: NEGATIVE
BUN SERPL-MCNC: 26 MG/DL (ref 8–23)
CALCIUM SERPL-MCNC: 9 MG/DL (ref 8.7–10.5)
CHLORIDE SERPL-SCNC: 104 MMOL/L (ref 95–110)
CLARITY UR REFRACT.AUTO: CLEAR
CO2 SERPL-SCNC: 25 MMOL/L (ref 23–29)
COLOR UR AUTO: YELLOW
CREAT SERPL-MCNC: 2.6 MG/DL (ref 0.5–1.4)
DIFFERENTIAL METHOD: ABNORMAL
EOSINOPHIL # BLD AUTO: 0.4 K/UL (ref 0–0.5)
EOSINOPHIL NFR BLD: 5.6 % (ref 0–8)
ERYTHROCYTE [DISTWIDTH] IN BLOOD BY AUTOMATED COUNT: 14.6 % (ref 11.5–14.5)
EST. GFR  (AFRICAN AMERICAN): 28.4 ML/MIN/1.73 M^2
EST. GFR  (NON AFRICAN AMERICAN): 24.6 ML/MIN/1.73 M^2
GLUCOSE SERPL-MCNC: 110 MG/DL (ref 70–110)
GLUCOSE UR QL STRIP: NEGATIVE
HCT VFR BLD AUTO: 39 % (ref 40–54)
HGB BLD-MCNC: 11.8 G/DL (ref 14–18)
HGB UR QL STRIP: NEGATIVE
IMM GRANULOCYTES # BLD AUTO: 0.02 K/UL (ref 0–0.04)
IMM GRANULOCYTES NFR BLD AUTO: 0.3 % (ref 0–0.5)
KETONES UR QL STRIP: NEGATIVE
LEUKOCYTE ESTERASE UR QL STRIP: NEGATIVE
LYMPHOCYTES # BLD AUTO: 1.4 K/UL (ref 1–4.8)
LYMPHOCYTES NFR BLD: 23.1 % (ref 18–48)
MCH RBC QN AUTO: 25.5 PG (ref 27–31)
MCHC RBC AUTO-ENTMCNC: 30.3 G/DL (ref 32–36)
MCV RBC AUTO: 84 FL (ref 82–98)
MONOCYTES # BLD AUTO: 0.7 K/UL (ref 0.3–1)
MONOCYTES NFR BLD: 11.1 % (ref 4–15)
NEUTROPHILS # BLD AUTO: 3.7 K/UL (ref 1.8–7.7)
NEUTROPHILS NFR BLD: 59.6 % (ref 38–73)
NITRITE UR QL STRIP: NEGATIVE
NRBC BLD-RTO: 0 /100 WBC
PH UR STRIP: 6 [PH] (ref 5–8)
PLATELET # BLD AUTO: 147 K/UL (ref 150–350)
PMV BLD AUTO: 13.5 FL (ref 9.2–12.9)
POTASSIUM SERPL-SCNC: 4.4 MMOL/L (ref 3.5–5.1)
PROT UR QL STRIP: NEGATIVE
RBC # BLD AUTO: 4.62 M/UL (ref 4.6–6.2)
SODIUM SERPL-SCNC: 140 MMOL/L (ref 136–145)
SP GR UR STRIP: 1.01 (ref 1–1.03)
URN SPEC COLLECT METH UR: NORMAL
WBC # BLD AUTO: 6.23 K/UL (ref 3.9–12.7)

## 2021-01-07 PROCEDURE — 96374 THER/PROPH/DIAG INJ IV PUSH: CPT

## 2021-01-07 PROCEDURE — 85025 COMPLETE CBC W/AUTO DIFF WBC: CPT

## 2021-01-07 PROCEDURE — 81003 URINALYSIS AUTO W/O SCOPE: CPT

## 2021-01-07 PROCEDURE — 99285 EMERGENCY DEPT VISIT HI MDM: CPT | Mod: ,,, | Performed by: EMERGENCY MEDICINE

## 2021-01-07 PROCEDURE — 99285 PR EMERGENCY DEPT VISIT,LEVEL V: ICD-10-PCS | Mod: ,,, | Performed by: EMERGENCY MEDICINE

## 2021-01-07 PROCEDURE — 99285 EMERGENCY DEPT VISIT HI MDM: CPT | Mod: 25

## 2021-01-07 PROCEDURE — 63600175 PHARM REV CODE 636 W HCPCS: Performed by: EMERGENCY MEDICINE

## 2021-01-07 PROCEDURE — 80048 BASIC METABOLIC PNL TOTAL CA: CPT

## 2021-01-07 RX ORDER — PREDNISONE 50 MG/1
50 TABLET ORAL DAILY
Qty: 5 TABLET | Refills: 0 | Status: SHIPPED | OUTPATIENT
Start: 2021-01-07 | End: 2021-03-23

## 2021-01-07 RX ORDER — PREDNISONE 50 MG/1
50 TABLET ORAL DAILY
Qty: 5 TABLET | Refills: 0 | Status: SHIPPED | OUTPATIENT
Start: 2021-01-07 | End: 2021-01-07 | Stop reason: SDUPTHER

## 2021-01-07 RX ORDER — MORPHINE SULFATE 4 MG/ML
4 INJECTION, SOLUTION INTRAMUSCULAR; INTRAVENOUS
Status: COMPLETED | OUTPATIENT
Start: 2021-01-07 | End: 2021-01-07

## 2021-01-07 RX ADMIN — MORPHINE SULFATE 4 MG: 4 INJECTION INTRAVENOUS at 01:01

## 2021-01-08 ENCOUNTER — TELEPHONE (OUTPATIENT)
Dept: ORTHOPEDICS | Facility: CLINIC | Age: 66
End: 2021-01-08
Payer: MEDICARE

## 2021-01-08 ENCOUNTER — PATIENT MESSAGE (OUTPATIENT)
Dept: TRANSPLANT | Facility: CLINIC | Age: 66
End: 2021-01-08

## 2021-01-08 DIAGNOSIS — Z12.11 SPECIAL SCREENING FOR MALIGNANT NEOPLASMS, COLON: Primary | ICD-10-CM

## 2021-01-08 DIAGNOSIS — Z01.818 PRE-OP TESTING: ICD-10-CM

## 2021-01-08 DIAGNOSIS — M51.36 DDD (DEGENERATIVE DISC DISEASE), LUMBAR: Primary | ICD-10-CM

## 2021-01-08 RX ORDER — POLYETHYLENE GLYCOL 3350, SODIUM SULFATE ANHYDROUS, SODIUM BICARBONATE, SODIUM CHLORIDE, POTASSIUM CHLORIDE 236; 22.74; 6.74; 5.86; 2.97 G/4L; G/4L; G/4L; G/4L; G/4L
4 POWDER, FOR SOLUTION ORAL ONCE
Qty: 4000 ML | Refills: 0 | Status: SHIPPED | OUTPATIENT
Start: 2021-01-08 | End: 2021-01-12

## 2021-01-12 ENCOUNTER — OFFICE VISIT (OUTPATIENT)
Dept: NEPHROLOGY | Facility: CLINIC | Age: 66
End: 2021-01-12
Payer: MEDICARE

## 2021-01-12 DIAGNOSIS — Z94.0 IMMUNOSUPPRESSIVE MANAGEMENT ENCOUNTER FOLLOWING KIDNEY TRANSPLANT: ICD-10-CM

## 2021-01-12 DIAGNOSIS — N25.81 SECONDARY RENAL HYPERPARATHYROIDISM: ICD-10-CM

## 2021-01-12 DIAGNOSIS — Z94.0 KIDNEY TRANSPLANT RECIPIENT: ICD-10-CM

## 2021-01-12 DIAGNOSIS — Z79.899 IMMUNODEFICIENCY DUE TO TREATMENT WITH IMMUNOSUPPRESSIVE MEDICATION: ICD-10-CM

## 2021-01-12 DIAGNOSIS — Z79.01 LONG TERM (CURRENT) USE OF ANTICOAGULANTS: ICD-10-CM

## 2021-01-12 DIAGNOSIS — K57.90 DIVERTICULOSIS: ICD-10-CM

## 2021-01-12 DIAGNOSIS — N18.32 STAGE 3B CHRONIC KIDNEY DISEASE: Primary | ICD-10-CM

## 2021-01-12 DIAGNOSIS — R73.9 HYPERGLYCEMIA: ICD-10-CM

## 2021-01-12 DIAGNOSIS — R09.89 CAROTID BRUIT, UNSPECIFIED LATERALITY: ICD-10-CM

## 2021-01-12 DIAGNOSIS — I50.30 HEART FAILURE WITH PRESERVED EJECTION FRACTION, UNSPECIFIED HF CHRONICITY: ICD-10-CM

## 2021-01-12 DIAGNOSIS — E66.9 OBESITY (BMI 30-39.9): ICD-10-CM

## 2021-01-12 DIAGNOSIS — I50.32 CHRONIC DIASTOLIC HEART FAILURE: ICD-10-CM

## 2021-01-12 DIAGNOSIS — Z79.899 IMMUNOSUPPRESSIVE MANAGEMENT ENCOUNTER FOLLOWING KIDNEY TRANSPLANT: ICD-10-CM

## 2021-01-12 DIAGNOSIS — R73.03 PREDIABETES: ICD-10-CM

## 2021-01-12 DIAGNOSIS — I47.10 SUPRAVENTRICULAR TACHYCARDIA: ICD-10-CM

## 2021-01-12 DIAGNOSIS — Z94.0 H/O KIDNEY TRANSPLANT: ICD-10-CM

## 2021-01-12 DIAGNOSIS — E55.9 VITAMIN D DEFICIENCY: ICD-10-CM

## 2021-01-12 DIAGNOSIS — E78.2 MIXED HYPERLIPIDEMIA: ICD-10-CM

## 2021-01-12 DIAGNOSIS — Z72.0 TOBACCO USE: ICD-10-CM

## 2021-01-12 DIAGNOSIS — Z86.79 HISTORY OF PERICARDITIS: ICD-10-CM

## 2021-01-12 DIAGNOSIS — R91.8 MULTIPLE LUNG NODULES ON CT: ICD-10-CM

## 2021-01-12 DIAGNOSIS — I48.0 PAROXYSMAL ATRIAL FIBRILLATION: ICD-10-CM

## 2021-01-12 DIAGNOSIS — I25.10 CORONARY ARTERY DISEASE INVOLVING NATIVE CORONARY ARTERY OF NATIVE HEART WITHOUT ANGINA PECTORIS: ICD-10-CM

## 2021-01-12 DIAGNOSIS — M89.8X9 METABOLIC BONE DISEASE: ICD-10-CM

## 2021-01-12 DIAGNOSIS — D84.821 IMMUNODEFICIENCY DUE TO TREATMENT WITH IMMUNOSUPPRESSIVE MEDICATION: ICD-10-CM

## 2021-01-12 DIAGNOSIS — I13.0 HYPERTENSIVE HEART AND RENAL DISEASE WITH RENAL FAILURE, STAGE 1 THROUGH STAGE 4 OR UNSPECIFIED CHRONIC KIDNEY DISEASE, WITH HEART FAILURE: ICD-10-CM

## 2021-01-12 PROCEDURE — 99215 PR OFFICE/OUTPT VISIT, EST, LEVL V, 40-54 MIN: ICD-10-PCS | Mod: 95,,, | Performed by: INTERNAL MEDICINE

## 2021-01-12 PROCEDURE — 99499 RISK ADDL DX/OHS AUDIT: ICD-10-PCS | Mod: 95,,, | Performed by: INTERNAL MEDICINE

## 2021-01-12 PROCEDURE — 99215 OFFICE O/P EST HI 40 MIN: CPT | Mod: 95,,, | Performed by: INTERNAL MEDICINE

## 2021-01-12 PROCEDURE — 99499 UNLISTED E&M SERVICE: CPT | Mod: 95,,, | Performed by: INTERNAL MEDICINE

## 2021-01-12 PROCEDURE — 1159F PR MEDICATION LIST DOCUMENTED IN MEDICAL RECORD: ICD-10-PCS | Mod: 95,,, | Performed by: INTERNAL MEDICINE

## 2021-01-12 PROCEDURE — 1159F MED LIST DOCD IN RCRD: CPT | Mod: 95,,, | Performed by: INTERNAL MEDICINE

## 2021-01-15 ENCOUNTER — PATIENT MESSAGE (OUTPATIENT)
Dept: NEPHROLOGY | Facility: CLINIC | Age: 66
End: 2021-01-15

## 2021-01-19 ENCOUNTER — HOSPITAL ENCOUNTER (OUTPATIENT)
Dept: RADIOLOGY | Facility: HOSPITAL | Age: 66
Discharge: HOME | End: 2021-01-19
Attending: ORTHOPAEDIC SURGERY
Payer: MEDICARE

## 2021-01-19 ENCOUNTER — OFFICE VISIT (OUTPATIENT)
Dept: ORTHOPEDICS | Facility: CLINIC | Age: 66
End: 2021-01-19
Payer: MEDICARE

## 2021-01-19 ENCOUNTER — OFFICE VISIT (OUTPATIENT)
Dept: CARDIOLOGY | Facility: CLINIC | Age: 66
End: 2021-01-19
Payer: MEDICARE

## 2021-01-19 VITALS — BODY MASS INDEX: 30.71 KG/M2 | HEIGHT: 73 IN | WEIGHT: 231.69 LBS

## 2021-01-19 VITALS
WEIGHT: 231.69 LBS | HEART RATE: 64 BPM | HEIGHT: 73 IN | DIASTOLIC BLOOD PRESSURE: 74 MMHG | SYSTOLIC BLOOD PRESSURE: 166 MMHG | BODY MASS INDEX: 30.71 KG/M2

## 2021-01-19 DIAGNOSIS — E78.2 MIXED HYPERLIPIDEMIA: ICD-10-CM

## 2021-01-19 DIAGNOSIS — Z72.0 TOBACCO USE: ICD-10-CM

## 2021-01-19 DIAGNOSIS — M51.36 DDD (DEGENERATIVE DISC DISEASE), LUMBAR: Primary | ICD-10-CM

## 2021-01-19 DIAGNOSIS — I50.32 CHRONIC DIASTOLIC HEART FAILURE: ICD-10-CM

## 2021-01-19 DIAGNOSIS — I48.0 PAROXYSMAL ATRIAL FIBRILLATION: Primary | ICD-10-CM

## 2021-01-19 DIAGNOSIS — N18.32 STAGE 3B CHRONIC KIDNEY DISEASE: ICD-10-CM

## 2021-01-19 DIAGNOSIS — M51.36 DDD (DEGENERATIVE DISC DISEASE), LUMBAR: ICD-10-CM

## 2021-01-19 DIAGNOSIS — I25.10 CORONARY ARTERY DISEASE INVOLVING NATIVE CORONARY ARTERY OF NATIVE HEART WITHOUT ANGINA PECTORIS: ICD-10-CM

## 2021-01-19 DIAGNOSIS — I13.0 HYPERTENSIVE HEART AND RENAL DISEASE WITH RENAL FAILURE, STAGE 1 THROUGH STAGE 4 OR UNSPECIFIED CHRONIC KIDNEY DISEASE, WITH HEART FAILURE: ICD-10-CM

## 2021-01-19 DIAGNOSIS — M54.16 LUMBAR RADICULOPATHY: ICD-10-CM

## 2021-01-19 DIAGNOSIS — Z94.0 H/O KIDNEY TRANSPLANT: ICD-10-CM

## 2021-01-19 PROCEDURE — 1125F AMNT PAIN NOTED PAIN PRSNT: CPT | Mod: S$GLB,,, | Performed by: PHYSICIAN ASSISTANT

## 2021-01-19 PROCEDURE — 99999 PR PBB SHADOW E&M-EST. PATIENT-LVL V: CPT | Mod: PBBFAC,,, | Performed by: INTERNAL MEDICINE

## 2021-01-19 PROCEDURE — 72120 X-RAY BEND ONLY L-S SPINE: CPT | Mod: TC

## 2021-01-19 PROCEDURE — 1126F PR PAIN SEVERITY QUANTIFIED, NO PAIN PRESENT: ICD-10-PCS | Mod: S$GLB,,, | Performed by: INTERNAL MEDICINE

## 2021-01-19 PROCEDURE — 99999 PR PBB SHADOW E&M-EST. PATIENT-LVL V: ICD-10-PCS | Mod: PBBFAC,,, | Performed by: INTERNAL MEDICINE

## 2021-01-19 PROCEDURE — 3078F DIAST BP <80 MM HG: CPT | Mod: CPTII,S$GLB,, | Performed by: INTERNAL MEDICINE

## 2021-01-19 PROCEDURE — 72120 X-RAY BEND ONLY L-S SPINE: CPT | Mod: 26,,, | Performed by: RADIOLOGY

## 2021-01-19 PROCEDURE — 99204 PR OFFICE/OUTPT VISIT, NEW, LEVL IV, 45-59 MIN: ICD-10-PCS | Mod: S$GLB,,, | Performed by: PHYSICIAN ASSISTANT

## 2021-01-19 PROCEDURE — 1159F MED LIST DOCD IN RCRD: CPT | Mod: S$GLB,,, | Performed by: INTERNAL MEDICINE

## 2021-01-19 PROCEDURE — 72100 X-RAY EXAM L-S SPINE 2/3 VWS: CPT | Mod: 26,,, | Performed by: RADIOLOGY

## 2021-01-19 PROCEDURE — 99204 OFFICE O/P NEW MOD 45 MIN: CPT | Mod: S$GLB,,, | Performed by: PHYSICIAN ASSISTANT

## 2021-01-19 PROCEDURE — 3008F PR BODY MASS INDEX (BMI) DOCUMENTED: ICD-10-PCS | Mod: CPTII,S$GLB,, | Performed by: PHYSICIAN ASSISTANT

## 2021-01-19 PROCEDURE — 3008F PR BODY MASS INDEX (BMI) DOCUMENTED: ICD-10-PCS | Mod: CPTII,S$GLB,, | Performed by: INTERNAL MEDICINE

## 2021-01-19 PROCEDURE — 99214 PR OFFICE/OUTPT VISIT, EST, LEVL IV, 30-39 MIN: ICD-10-PCS | Mod: S$GLB,,, | Performed by: INTERNAL MEDICINE

## 2021-01-19 PROCEDURE — 1159F MED LIST DOCD IN RCRD: CPT | Mod: S$GLB,,, | Performed by: PHYSICIAN ASSISTANT

## 2021-01-19 PROCEDURE — 3008F BODY MASS INDEX DOCD: CPT | Mod: CPTII,S$GLB,, | Performed by: PHYSICIAN ASSISTANT

## 2021-01-19 PROCEDURE — 1159F PR MEDICATION LIST DOCUMENTED IN MEDICAL RECORD: ICD-10-PCS | Mod: S$GLB,,, | Performed by: INTERNAL MEDICINE

## 2021-01-19 PROCEDURE — 3077F PR MOST RECENT SYSTOLIC BLOOD PRESSURE >= 140 MM HG: ICD-10-PCS | Mod: CPTII,S$GLB,, | Performed by: INTERNAL MEDICINE

## 2021-01-19 PROCEDURE — 3008F BODY MASS INDEX DOCD: CPT | Mod: CPTII,S$GLB,, | Performed by: INTERNAL MEDICINE

## 2021-01-19 PROCEDURE — 72120 XR LUMBAR SPINE AP AND LAT WITH FLEX/EXT: ICD-10-PCS | Mod: 26,,, | Performed by: RADIOLOGY

## 2021-01-19 PROCEDURE — 99214 OFFICE O/P EST MOD 30 MIN: CPT | Mod: S$GLB,,, | Performed by: INTERNAL MEDICINE

## 2021-01-19 PROCEDURE — 99999 PR PBB SHADOW E&M-EST. PATIENT-LVL IV: CPT | Mod: PBBFAC,,, | Performed by: PHYSICIAN ASSISTANT

## 2021-01-19 PROCEDURE — 72100 XR LUMBAR SPINE AP AND LAT WITH FLEX/EXT: ICD-10-PCS | Mod: 26,,, | Performed by: RADIOLOGY

## 2021-01-19 PROCEDURE — 99999 PR PBB SHADOW E&M-EST. PATIENT-LVL IV: ICD-10-PCS | Mod: PBBFAC,,, | Performed by: PHYSICIAN ASSISTANT

## 2021-01-19 PROCEDURE — 3077F SYST BP >= 140 MM HG: CPT | Mod: CPTII,S$GLB,, | Performed by: INTERNAL MEDICINE

## 2021-01-19 PROCEDURE — 1126F AMNT PAIN NOTED NONE PRSNT: CPT | Mod: S$GLB,,, | Performed by: INTERNAL MEDICINE

## 2021-01-19 PROCEDURE — 3078F PR MOST RECENT DIASTOLIC BLOOD PRESSURE < 80 MM HG: ICD-10-PCS | Mod: CPTII,S$GLB,, | Performed by: INTERNAL MEDICINE

## 2021-01-19 PROCEDURE — 1125F PR PAIN SEVERITY QUANTIFIED, PAIN PRESENT: ICD-10-PCS | Mod: S$GLB,,, | Performed by: PHYSICIAN ASSISTANT

## 2021-01-19 PROCEDURE — 1159F PR MEDICATION LIST DOCUMENTED IN MEDICAL RECORD: ICD-10-PCS | Mod: S$GLB,,, | Performed by: PHYSICIAN ASSISTANT

## 2021-01-21 ENCOUNTER — TELEPHONE (OUTPATIENT)
Dept: NEPHROLOGY | Facility: CLINIC | Age: 66
End: 2021-01-21

## 2021-01-21 RX ORDER — CALCIUM CARBONATE 500(1250)
TABLET ORAL
Qty: 360 TABLET | Refills: 3 | Status: SHIPPED | OUTPATIENT
Start: 2021-01-21 | End: 2021-01-27 | Stop reason: SDUPTHER

## 2021-01-22 ENCOUNTER — PATIENT MESSAGE (OUTPATIENT)
Dept: NEPHROLOGY | Facility: CLINIC | Age: 66
End: 2021-01-22

## 2021-01-26 ENCOUNTER — PATIENT MESSAGE (OUTPATIENT)
Dept: NEPHROLOGY | Facility: CLINIC | Age: 66
End: 2021-01-26

## 2021-01-27 DIAGNOSIS — I13.0 HYPERTENSIVE HEART AND RENAL DISEASE WITH RENAL FAILURE, STAGE 1 THROUGH STAGE 4 OR UNSPECIFIED CHRONIC KIDNEY DISEASE, WITH HEART FAILURE: ICD-10-CM

## 2021-01-27 RX ORDER — CARVEDILOL 25 MG/1
25 TABLET ORAL 2 TIMES DAILY WITH MEALS
Qty: 180 TABLET | Refills: 3 | Status: SHIPPED | OUTPATIENT
Start: 2021-01-27 | End: 2021-03-17 | Stop reason: SDUPTHER

## 2021-01-31 LAB
ACID FAST MOD KINY STN SPEC: NORMAL
MYCOBACTERIUM SPEC QL CULT: NORMAL

## 2021-02-01 ENCOUNTER — LAB VISIT (OUTPATIENT)
Dept: INTERNAL MEDICINE | Facility: CLINIC | Age: 66
End: 2021-02-01
Payer: MEDICARE

## 2021-02-01 DIAGNOSIS — Z01.818 PRE-OP TESTING: ICD-10-CM

## 2021-02-01 LAB — SARS-COV-2 RNA RESP QL NAA+PROBE: NOT DETECTED

## 2021-02-01 PROCEDURE — U0003 INFECTIOUS AGENT DETECTION BY NUCLEIC ACID (DNA OR RNA); SEVERE ACUTE RESPIRATORY SYNDROME CORONAVIRUS 2 (SARS-COV-2) (CORONAVIRUS DISEASE [COVID-19]), AMPLIFIED PROBE TECHNIQUE, MAKING USE OF HIGH THROUGHPUT TECHNOLOGIES AS DESCRIBED BY CMS-2020-01-R: HCPCS

## 2021-02-04 ENCOUNTER — HOSPITAL ENCOUNTER (OUTPATIENT)
Facility: HOSPITAL | Age: 66
Discharge: HOME | End: 2021-02-04
Attending: INTERNAL MEDICINE | Admitting: INTERNAL MEDICINE
Payer: MEDICARE

## 2021-02-04 ENCOUNTER — TELEPHONE (OUTPATIENT)
Dept: ENDOSCOPY | Facility: HOSPITAL | Age: 66
End: 2021-02-04

## 2021-02-04 ENCOUNTER — ANESTHESIA EVENT (OUTPATIENT)
Dept: ENDOSCOPY | Facility: HOSPITAL | Age: 66
End: 2021-02-04
Payer: MEDICARE

## 2021-02-04 ENCOUNTER — ANESTHESIA (OUTPATIENT)
Dept: ENDOSCOPY | Facility: HOSPITAL | Age: 66
End: 2021-02-04
Payer: MEDICARE

## 2021-02-04 VITALS
WEIGHT: 235 LBS | TEMPERATURE: 98 F | DIASTOLIC BLOOD PRESSURE: 82 MMHG | RESPIRATION RATE: 18 BRPM | OXYGEN SATURATION: 99 % | SYSTOLIC BLOOD PRESSURE: 180 MMHG | HEART RATE: 56 BPM | BODY MASS INDEX: 31.14 KG/M2 | HEIGHT: 73 IN

## 2021-02-04 DIAGNOSIS — Z01.818 PRE-OP TESTING: Primary | ICD-10-CM

## 2021-02-04 DIAGNOSIS — D12.6 COLON ADENOMA: ICD-10-CM

## 2021-02-04 DIAGNOSIS — Z86.010 HISTORY OF ADENOMATOUS POLYP OF COLON: Primary | ICD-10-CM

## 2021-02-04 DIAGNOSIS — Z12.11 COLON CANCER SCREENING: Primary | ICD-10-CM

## 2021-02-04 DIAGNOSIS — Z79.01 CURRENT USE OF LONG TERM ANTICOAGULATION: ICD-10-CM

## 2021-02-04 PROCEDURE — G0105 COLORECTAL SCRN; HI RISK IND: HCPCS | Performed by: INTERNAL MEDICINE

## 2021-02-04 PROCEDURE — 37000008 HC ANESTHESIA 1ST 15 MINUTES: Performed by: INTERNAL MEDICINE

## 2021-02-04 PROCEDURE — 25000003 PHARM REV CODE 250: Performed by: NURSE ANESTHETIST, CERTIFIED REGISTERED

## 2021-02-04 PROCEDURE — 25000003 PHARM REV CODE 250: Performed by: INTERNAL MEDICINE

## 2021-02-04 PROCEDURE — G0105 COLORECTAL SCRN; HI RISK IND: HCPCS | Mod: ,,, | Performed by: INTERNAL MEDICINE

## 2021-02-04 PROCEDURE — G0105 COLORECTAL SCRN; HI RISK IND: ICD-10-PCS | Mod: ,,, | Performed by: INTERNAL MEDICINE

## 2021-02-04 PROCEDURE — E9220 PRA ENDO ANESTHESIA: ICD-10-PCS | Mod: ,,, | Performed by: NURSE ANESTHETIST, CERTIFIED REGISTERED

## 2021-02-04 PROCEDURE — 63600175 PHARM REV CODE 636 W HCPCS: Performed by: NURSE ANESTHETIST, CERTIFIED REGISTERED

## 2021-02-04 PROCEDURE — 37000009 HC ANESTHESIA EA ADD 15 MINS: Performed by: INTERNAL MEDICINE

## 2021-02-04 PROCEDURE — E9220 PRA ENDO ANESTHESIA: HCPCS | Mod: ,,, | Performed by: NURSE ANESTHETIST, CERTIFIED REGISTERED

## 2021-02-04 RX ORDER — POLYETHYLENE GLYCOL 3350, SODIUM SULFATE ANHYDROUS, SODIUM BICARBONATE, SODIUM CHLORIDE, POTASSIUM CHLORIDE 236; 22.74; 6.74; 5.86; 2.97 G/4L; G/4L; G/4L; G/4L; G/4L
4 POWDER, FOR SOLUTION ORAL ONCE
Qty: 4000 ML | Refills: 0 | Status: SHIPPED | OUTPATIENT
Start: 2021-02-04 | End: 2021-02-05

## 2021-02-04 RX ORDER — LIDOCAINE HYDROCHLORIDE 20 MG/ML
INJECTION INTRAVENOUS
Status: DISCONTINUED | OUTPATIENT
Start: 2021-02-04 | End: 2021-02-04

## 2021-02-04 RX ORDER — SODIUM CHLORIDE 9 MG/ML
INJECTION, SOLUTION INTRAVENOUS CONTINUOUS
Status: DISCONTINUED | OUTPATIENT
Start: 2021-02-04 | End: 2021-02-04 | Stop reason: HOSPADM

## 2021-02-04 RX ORDER — PROPOFOL 10 MG/ML
VIAL (ML) INTRAVENOUS
Status: DISCONTINUED | OUTPATIENT
Start: 2021-02-04 | End: 2021-02-04

## 2021-02-04 RX ADMIN — PROPOFOL 100 MG: 10 INJECTION, EMULSION INTRAVENOUS at 10:02

## 2021-02-04 RX ADMIN — PROPOFOL 50 MG: 10 INJECTION, EMULSION INTRAVENOUS at 10:02

## 2021-02-04 RX ADMIN — SODIUM CHLORIDE: 0.9 INJECTION, SOLUTION INTRAVENOUS at 09:02

## 2021-02-04 RX ADMIN — LIDOCAINE HYDROCHLORIDE 50 MG: 20 INJECTION, SOLUTION INTRAVENOUS at 10:02

## 2021-02-10 ENCOUNTER — PATIENT MESSAGE (OUTPATIENT)
Dept: INTERNAL MEDICINE | Facility: CLINIC | Age: 66
End: 2021-02-10

## 2021-02-28 ENCOUNTER — LAB VISIT (OUTPATIENT)
Dept: URGENT CARE | Facility: CLINIC | Age: 66
End: 2021-02-28
Payer: MEDICARE

## 2021-02-28 DIAGNOSIS — Z01.818 PRE-OP TESTING: ICD-10-CM

## 2021-02-28 PROCEDURE — U0003 INFECTIOUS AGENT DETECTION BY NUCLEIC ACID (DNA OR RNA); SEVERE ACUTE RESPIRATORY SYNDROME CORONAVIRUS 2 (SARS-COV-2) (CORONAVIRUS DISEASE [COVID-19]), AMPLIFIED PROBE TECHNIQUE, MAKING USE OF HIGH THROUGHPUT TECHNOLOGIES AS DESCRIBED BY CMS-2020-01-R: HCPCS

## 2021-02-28 PROCEDURE — U0005 INFEC AGEN DETEC AMPLI PROBE: HCPCS

## 2021-03-01 LAB — SARS-COV-2 RNA RESP QL NAA+PROBE: NOT DETECTED

## 2021-03-03 ENCOUNTER — ANESTHESIA EVENT (OUTPATIENT)
Dept: ENDOSCOPY | Facility: HOSPITAL | Age: 66
End: 2021-03-03
Payer: MEDICARE

## 2021-03-03 ENCOUNTER — HOSPITAL ENCOUNTER (OUTPATIENT)
Facility: HOSPITAL | Age: 66
Discharge: HOME | End: 2021-03-03
Attending: INTERNAL MEDICINE | Admitting: INTERNAL MEDICINE
Payer: MEDICARE

## 2021-03-03 ENCOUNTER — ANESTHESIA (OUTPATIENT)
Dept: ENDOSCOPY | Facility: HOSPITAL | Age: 66
End: 2021-03-03
Payer: MEDICARE

## 2021-03-03 VITALS
TEMPERATURE: 98 F | HEART RATE: 63 BPM | OXYGEN SATURATION: 97 % | BODY MASS INDEX: 30.75 KG/M2 | DIASTOLIC BLOOD PRESSURE: 78 MMHG | HEIGHT: 73 IN | RESPIRATION RATE: 17 BRPM | WEIGHT: 232 LBS | SYSTOLIC BLOOD PRESSURE: 161 MMHG

## 2021-03-03 DIAGNOSIS — K63.5 COLON POLYPS: ICD-10-CM

## 2021-03-03 PROCEDURE — 88305 TISSUE EXAM BY PATHOLOGIST: CPT | Mod: 26,,, | Performed by: PATHOLOGY

## 2021-03-03 PROCEDURE — 37000009 HC ANESTHESIA EA ADD 15 MINS: Performed by: INTERNAL MEDICINE

## 2021-03-03 PROCEDURE — 37000008 HC ANESTHESIA 1ST 15 MINUTES: Performed by: INTERNAL MEDICINE

## 2021-03-03 PROCEDURE — 88305 TISSUE EXAM BY PATHOLOGIST: ICD-10-PCS | Mod: 26,,, | Performed by: PATHOLOGY

## 2021-03-03 PROCEDURE — G0105 COLORECTAL SCRN; HI RISK IND: HCPCS | Performed by: INTERNAL MEDICINE

## 2021-03-03 PROCEDURE — E9220 PRA ENDO ANESTHESIA: HCPCS | Mod: ,,, | Performed by: NURSE ANESTHETIST, CERTIFIED REGISTERED

## 2021-03-03 PROCEDURE — 88305 TISSUE EXAM BY PATHOLOGIST: CPT | Performed by: PATHOLOGY

## 2021-03-03 PROCEDURE — E9220 PRA ENDO ANESTHESIA: ICD-10-PCS | Mod: ,,, | Performed by: NURSE ANESTHETIST, CERTIFIED REGISTERED

## 2021-03-03 PROCEDURE — G0105 COLORECTAL SCRN; HI RISK IND: HCPCS | Mod: ,,, | Performed by: INTERNAL MEDICINE

## 2021-03-03 PROCEDURE — 63600175 PHARM REV CODE 636 W HCPCS: Performed by: NURSE ANESTHETIST, CERTIFIED REGISTERED

## 2021-03-03 PROCEDURE — G0105 COLORECTAL SCRN; HI RISK IND: ICD-10-PCS | Mod: ,,, | Performed by: INTERNAL MEDICINE

## 2021-03-03 PROCEDURE — 25000003 PHARM REV CODE 250: Performed by: NURSE ANESTHETIST, CERTIFIED REGISTERED

## 2021-03-03 PROCEDURE — 25000003 PHARM REV CODE 250: Performed by: INTERNAL MEDICINE

## 2021-03-03 RX ORDER — SODIUM CHLORIDE 9 MG/ML
INJECTION, SOLUTION INTRAVENOUS CONTINUOUS
Status: DISCONTINUED | OUTPATIENT
Start: 2021-03-03 | End: 2021-03-03 | Stop reason: HOSPADM

## 2021-03-03 RX ORDER — LIDOCAINE HYDROCHLORIDE 20 MG/ML
INJECTION INTRAVENOUS
Status: DISCONTINUED | OUTPATIENT
Start: 2021-03-03 | End: 2021-03-03

## 2021-03-03 RX ORDER — PROPOFOL 10 MG/ML
VIAL (ML) INTRAVENOUS
Status: DISCONTINUED | OUTPATIENT
Start: 2021-03-03 | End: 2021-03-03

## 2021-03-03 RX ORDER — PROPOFOL 10 MG/ML
VIAL (ML) INTRAVENOUS CONTINUOUS PRN
Status: DISCONTINUED | OUTPATIENT
Start: 2021-03-03 | End: 2021-03-03

## 2021-03-03 RX ADMIN — LIDOCAINE HYDROCHLORIDE 100 MG: 20 INJECTION, SOLUTION INTRAVENOUS at 01:03

## 2021-03-03 RX ADMIN — GLYCOPYRROLATE 0.1 MG: 0.2 INJECTION, SOLUTION INTRAMUSCULAR; INTRAVITREAL at 01:03

## 2021-03-03 RX ADMIN — SODIUM CHLORIDE: 0.9 INJECTION, SOLUTION INTRAVENOUS at 12:03

## 2021-03-03 RX ADMIN — PROPOFOL 200 MCG/KG/MIN: 10 INJECTION, EMULSION INTRAVENOUS at 01:03

## 2021-03-03 RX ADMIN — PROPOFOL 80 MG: 10 INJECTION, EMULSION INTRAVENOUS at 01:03

## 2021-03-05 LAB
FINAL PATHOLOGIC DIAGNOSIS: NORMAL
GROSS: NORMAL
Lab: NORMAL

## 2021-03-09 ENCOUNTER — PES CALL (OUTPATIENT)
Dept: ADMINISTRATIVE | Facility: CLINIC | Age: 66
End: 2021-03-09

## 2021-03-17 ENCOUNTER — PATIENT MESSAGE (OUTPATIENT)
Dept: INTERNAL MEDICINE | Facility: CLINIC | Age: 66
End: 2021-03-17

## 2021-03-17 DIAGNOSIS — I13.0 HYPERTENSIVE HEART AND RENAL DISEASE WITH RENAL FAILURE, STAGE 1 THROUGH STAGE 4 OR UNSPECIFIED CHRONIC KIDNEY DISEASE, WITH HEART FAILURE: ICD-10-CM

## 2021-03-17 DIAGNOSIS — E87.6 HYPOKALEMIA: ICD-10-CM

## 2021-03-17 RX ORDER — POTASSIUM CHLORIDE 750 MG/1
10 TABLET, EXTENDED RELEASE ORAL DAILY
Qty: 30 TABLET | Refills: 0 | Status: SHIPPED | OUTPATIENT
Start: 2021-03-17 | End: 2021-06-25

## 2021-03-18 RX ORDER — CARVEDILOL 25 MG/1
25 TABLET ORAL 2 TIMES DAILY WITH MEALS
Qty: 180 TABLET | Refills: 3 | Status: SHIPPED | OUTPATIENT
Start: 2021-03-18 | End: 2021-09-16

## 2021-03-23 ENCOUNTER — OFFICE VISIT (OUTPATIENT)
Dept: INTERNAL MEDICINE | Facility: CLINIC | Age: 66
End: 2021-03-23
Payer: MEDICARE

## 2021-03-23 VITALS
DIASTOLIC BLOOD PRESSURE: 68 MMHG | BODY MASS INDEX: 29.68 KG/M2 | OXYGEN SATURATION: 99 % | SYSTOLIC BLOOD PRESSURE: 122 MMHG | WEIGHT: 223.94 LBS | HEIGHT: 73 IN | HEART RATE: 52 BPM

## 2021-03-23 DIAGNOSIS — R73.03 PREDIABETES: ICD-10-CM

## 2021-03-23 DIAGNOSIS — Z12.2 ENCOUNTER FOR SCREENING FOR MALIGNANT NEOPLASM OF RESPIRATORY ORGANS: ICD-10-CM

## 2021-03-23 DIAGNOSIS — N18.32 STAGE 3B CHRONIC KIDNEY DISEASE: ICD-10-CM

## 2021-03-23 DIAGNOSIS — Z72.0 TOBACCO USE: ICD-10-CM

## 2021-03-23 DIAGNOSIS — R91.8 MULTIPLE LUNG NODULES ON CT: ICD-10-CM

## 2021-03-23 DIAGNOSIS — I25.10 CORONARY ARTERY DISEASE INVOLVING NATIVE CORONARY ARTERY OF NATIVE HEART WITHOUT ANGINA PECTORIS: ICD-10-CM

## 2021-03-23 DIAGNOSIS — E66.9 OBESITY (BMI 30-39.9): ICD-10-CM

## 2021-03-23 DIAGNOSIS — Z94.0 H/O KIDNEY TRANSPLANT: ICD-10-CM

## 2021-03-23 DIAGNOSIS — Z94.0 IMMUNOSUPPRESSIVE MANAGEMENT ENCOUNTER FOLLOWING KIDNEY TRANSPLANT: ICD-10-CM

## 2021-03-23 DIAGNOSIS — I50.32 CHRONIC DIASTOLIC HEART FAILURE: ICD-10-CM

## 2021-03-23 DIAGNOSIS — J43.8 OTHER EMPHYSEMA: ICD-10-CM

## 2021-03-23 DIAGNOSIS — I77.9 CAROTID ARTERY DISEASE, UNSPECIFIED LATERALITY, UNSPECIFIED TYPE: ICD-10-CM

## 2021-03-23 DIAGNOSIS — N25.81 SECONDARY RENAL HYPERPARATHYROIDISM: ICD-10-CM

## 2021-03-23 DIAGNOSIS — I48.0 PAROXYSMAL ATRIAL FIBRILLATION: ICD-10-CM

## 2021-03-23 DIAGNOSIS — Z79.899 IMMUNOSUPPRESSIVE MANAGEMENT ENCOUNTER FOLLOWING KIDNEY TRANSPLANT: ICD-10-CM

## 2021-03-23 DIAGNOSIS — I13.0 HYPERTENSIVE HEART AND RENAL DISEASE WITH RENAL FAILURE, STAGE 1 THROUGH STAGE 4 OR UNSPECIFIED CHRONIC KIDNEY DISEASE, WITH HEART FAILURE: Primary | ICD-10-CM

## 2021-03-23 DIAGNOSIS — I47.10 SUPRAVENTRICULAR TACHYCARDIA: ICD-10-CM

## 2021-03-23 DIAGNOSIS — D63.1 ANEMIA DUE TO STAGE 3B CHRONIC KIDNEY DISEASE: ICD-10-CM

## 2021-03-23 DIAGNOSIS — E78.2 MIXED HYPERLIPIDEMIA: ICD-10-CM

## 2021-03-23 DIAGNOSIS — N18.32 ANEMIA DUE TO STAGE 3B CHRONIC KIDNEY DISEASE: ICD-10-CM

## 2021-03-23 PROCEDURE — 3074F SYST BP LT 130 MM HG: CPT | Mod: CPTII,S$GLB,, | Performed by: INTERNAL MEDICINE

## 2021-03-23 PROCEDURE — 99214 PR OFFICE/OUTPT VISIT, EST, LEVL IV, 30-39 MIN: ICD-10-PCS | Mod: S$GLB,,, | Performed by: INTERNAL MEDICINE

## 2021-03-23 PROCEDURE — 3008F BODY MASS INDEX DOCD: CPT | Mod: CPTII,S$GLB,, | Performed by: INTERNAL MEDICINE

## 2021-03-23 PROCEDURE — 1159F PR MEDICATION LIST DOCUMENTED IN MEDICAL RECORD: ICD-10-PCS | Mod: S$GLB,,, | Performed by: INTERNAL MEDICINE

## 2021-03-23 PROCEDURE — 1159F MED LIST DOCD IN RCRD: CPT | Mod: S$GLB,,, | Performed by: INTERNAL MEDICINE

## 2021-03-23 PROCEDURE — 3074F PR MOST RECENT SYSTOLIC BLOOD PRESSURE < 130 MM HG: ICD-10-PCS | Mod: CPTII,S$GLB,, | Performed by: INTERNAL MEDICINE

## 2021-03-23 PROCEDURE — 99214 OFFICE O/P EST MOD 30 MIN: CPT | Mod: S$GLB,,, | Performed by: INTERNAL MEDICINE

## 2021-03-23 PROCEDURE — 1126F AMNT PAIN NOTED NONE PRSNT: CPT | Mod: S$GLB,,, | Performed by: INTERNAL MEDICINE

## 2021-03-23 PROCEDURE — 99999 PR PBB SHADOW E&M-EST. PATIENT-LVL V: CPT | Mod: PBBFAC,,, | Performed by: INTERNAL MEDICINE

## 2021-03-23 PROCEDURE — 1126F PR PAIN SEVERITY QUANTIFIED, NO PAIN PRESENT: ICD-10-PCS | Mod: S$GLB,,, | Performed by: INTERNAL MEDICINE

## 2021-03-23 PROCEDURE — 3078F PR MOST RECENT DIASTOLIC BLOOD PRESSURE < 80 MM HG: ICD-10-PCS | Mod: CPTII,S$GLB,, | Performed by: INTERNAL MEDICINE

## 2021-03-23 PROCEDURE — 3008F PR BODY MASS INDEX (BMI) DOCUMENTED: ICD-10-PCS | Mod: CPTII,S$GLB,, | Performed by: INTERNAL MEDICINE

## 2021-03-23 PROCEDURE — 99999 PR PBB SHADOW E&M-EST. PATIENT-LVL V: ICD-10-PCS | Mod: PBBFAC,,, | Performed by: INTERNAL MEDICINE

## 2021-03-23 PROCEDURE — 99499 UNLISTED E&M SERVICE: CPT | Mod: S$GLB,,, | Performed by: INTERNAL MEDICINE

## 2021-03-23 PROCEDURE — 3078F DIAST BP <80 MM HG: CPT | Mod: CPTII,S$GLB,, | Performed by: INTERNAL MEDICINE

## 2021-03-23 PROCEDURE — 99499 RISK ADDL DX/OHS AUDIT: ICD-10-PCS | Mod: S$GLB,,, | Performed by: INTERNAL MEDICINE

## 2021-03-25 DIAGNOSIS — Z94.0 KIDNEY REPLACED BY TRANSPLANT: Primary | ICD-10-CM

## 2021-04-05 ENCOUNTER — PATIENT MESSAGE (OUTPATIENT)
Dept: ADMINISTRATIVE | Facility: HOSPITAL | Age: 66
End: 2021-04-05

## 2021-04-05 NOTE — TELEPHONE ENCOUNTER
Dr. Milena Magdaleno called back, states that patient needs a lot of work done. Would like to know if patient also needs to stop Coumadin for teeth cleaning. Please advise.   no

## 2021-04-19 ENCOUNTER — TELEPHONE (OUTPATIENT)
Dept: TRANSPLANT | Facility: CLINIC | Age: 66
End: 2021-04-19

## 2021-04-19 ENCOUNTER — LAB VISIT (OUTPATIENT)
Dept: LAB | Facility: HOSPITAL | Age: 66
End: 2021-04-19
Attending: INTERNAL MEDICINE
Payer: MEDICARE

## 2021-04-19 DIAGNOSIS — Z94.0 KIDNEY REPLACED BY TRANSPLANT: ICD-10-CM

## 2021-04-19 LAB
ALBUMIN SERPL BCP-MCNC: 3.3 G/DL (ref 3.5–5.2)
ALBUMIN SERPL BCP-MCNC: 3.3 G/DL (ref 3.5–5.2)
ALP SERPL-CCNC: 71 U/L (ref 55–135)
ALT SERPL W/O P-5'-P-CCNC: 15 U/L (ref 10–44)
ANION GAP SERPL CALC-SCNC: 7 MMOL/L (ref 8–16)
AST SERPL-CCNC: 20 U/L (ref 10–40)
BASOPHILS # BLD AUTO: 0.02 K/UL (ref 0–0.2)
BASOPHILS NFR BLD: 0.3 % (ref 0–1.9)
BILIRUB DIRECT SERPL-MCNC: 0.1 MG/DL (ref 0.1–0.3)
BILIRUB SERPL-MCNC: 0.2 MG/DL (ref 0.1–1)
BUN SERPL-MCNC: 21 MG/DL (ref 8–23)
CALCIUM SERPL-MCNC: 8.8 MG/DL (ref 8.7–10.5)
CHLORIDE SERPL-SCNC: 106 MMOL/L (ref 95–110)
CO2 SERPL-SCNC: 25 MMOL/L (ref 23–29)
CREAT SERPL-MCNC: 2.4 MG/DL (ref 0.5–1.4)
DIFFERENTIAL METHOD: ABNORMAL
EOSINOPHIL # BLD AUTO: 0.5 K/UL (ref 0–0.5)
EOSINOPHIL NFR BLD: 7 % (ref 0–8)
ERYTHROCYTE [DISTWIDTH] IN BLOOD BY AUTOMATED COUNT: 14.7 % (ref 11.5–14.5)
EST. GFR  (AFRICAN AMERICAN): 31.3 ML/MIN/1.73 M^2
EST. GFR  (NON AFRICAN AMERICAN): 27.1 ML/MIN/1.73 M^2
GLUCOSE SERPL-MCNC: 105 MG/DL (ref 70–110)
HCT VFR BLD AUTO: 34.1 % (ref 40–54)
HGB BLD-MCNC: 10.5 G/DL (ref 14–18)
IMM GRANULOCYTES # BLD AUTO: 0.02 K/UL (ref 0–0.04)
IMM GRANULOCYTES NFR BLD AUTO: 0.3 % (ref 0–0.5)
LYMPHOCYTES # BLD AUTO: 1.3 K/UL (ref 1–4.8)
LYMPHOCYTES NFR BLD: 19.7 % (ref 18–48)
MAGNESIUM SERPL-MCNC: 1.8 MG/DL (ref 1.6–2.6)
MCH RBC QN AUTO: 25.1 PG (ref 27–31)
MCHC RBC AUTO-ENTMCNC: 30.8 G/DL (ref 32–36)
MCV RBC AUTO: 81 FL (ref 82–98)
MONOCYTES # BLD AUTO: 0.7 K/UL (ref 0.3–1)
MONOCYTES NFR BLD: 11.1 % (ref 4–15)
NEUTROPHILS # BLD AUTO: 4 K/UL (ref 1.8–7.7)
NEUTROPHILS NFR BLD: 61.6 % (ref 38–73)
NRBC BLD-RTO: 0 /100 WBC
PHOSPHATE SERPL-MCNC: 4 MG/DL (ref 2.7–4.5)
PLATELET # BLD AUTO: 159 K/UL (ref 150–450)
PMV BLD AUTO: 13.3 FL (ref 9.2–12.9)
POTASSIUM SERPL-SCNC: 4 MMOL/L (ref 3.5–5.1)
PROT SERPL-MCNC: 6.8 G/DL (ref 6–8.4)
RBC # BLD AUTO: 4.19 M/UL (ref 4.6–6.2)
SODIUM SERPL-SCNC: 138 MMOL/L (ref 136–145)
TACROLIMUS BLD-MCNC: 5.6 NG/ML (ref 5–15)
WBC # BLD AUTO: 6.41 K/UL (ref 3.9–12.7)

## 2021-04-19 PROCEDURE — 80197 ASSAY OF TACROLIMUS: CPT | Performed by: NURSE PRACTITIONER

## 2021-04-19 PROCEDURE — 84075 ASSAY ALKALINE PHOSPHATASE: CPT | Performed by: NURSE PRACTITIONER

## 2021-04-19 PROCEDURE — 36415 COLL VENOUS BLD VENIPUNCTURE: CPT | Performed by: NURSE PRACTITIONER

## 2021-04-19 PROCEDURE — 80069 RENAL FUNCTION PANEL: CPT | Performed by: NURSE PRACTITIONER

## 2021-04-19 PROCEDURE — 84460 ALANINE AMINO (ALT) (SGPT): CPT | Performed by: NURSE PRACTITIONER

## 2021-04-19 PROCEDURE — 83735 ASSAY OF MAGNESIUM: CPT | Performed by: NURSE PRACTITIONER

## 2021-04-19 PROCEDURE — 85025 COMPLETE CBC W/AUTO DIFF WBC: CPT | Performed by: NURSE PRACTITIONER

## 2021-04-26 ENCOUNTER — OFFICE VISIT (OUTPATIENT)
Dept: TRANSPLANT | Facility: CLINIC | Age: 66
End: 2021-04-26
Payer: MEDICARE

## 2021-04-26 VITALS
HEART RATE: 61 BPM | DIASTOLIC BLOOD PRESSURE: 65 MMHG | OXYGEN SATURATION: 100 % | RESPIRATION RATE: 16 BRPM | HEIGHT: 73 IN | SYSTOLIC BLOOD PRESSURE: 136 MMHG | BODY MASS INDEX: 29.95 KG/M2 | TEMPERATURE: 99 F | WEIGHT: 226 LBS

## 2021-04-26 DIAGNOSIS — Z94.0 KIDNEY REPLACED BY TRANSPLANT: Primary | ICD-10-CM

## 2021-04-26 DIAGNOSIS — I48.91 ATRIAL FIBRILLATION, UNSPECIFIED TYPE: ICD-10-CM

## 2021-04-26 DIAGNOSIS — R25.1 TREMOR OF RIGHT HAND: ICD-10-CM

## 2021-04-26 DIAGNOSIS — N25.81 SECONDARY RENAL HYPERPARATHYROIDISM: ICD-10-CM

## 2021-04-26 DIAGNOSIS — I25.10 CORONARY ARTERY DISEASE INVOLVING NATIVE CORONARY ARTERY OF NATIVE HEART WITHOUT ANGINA PECTORIS: ICD-10-CM

## 2021-04-26 DIAGNOSIS — D63.1 ANEMIA DUE TO STAGE 3 CHRONIC KIDNEY DISEASE, UNSPECIFIED WHETHER STAGE 3A OR 3B CKD: ICD-10-CM

## 2021-04-26 DIAGNOSIS — E66.9 OBESITY (BMI 30-39.9): ICD-10-CM

## 2021-04-26 DIAGNOSIS — Z94.0 IMMUNOSUPPRESSIVE MANAGEMENT ENCOUNTER FOLLOWING KIDNEY TRANSPLANT: ICD-10-CM

## 2021-04-26 DIAGNOSIS — Z79.899 IMMUNOSUPPRESSIVE MANAGEMENT ENCOUNTER FOLLOWING KIDNEY TRANSPLANT: ICD-10-CM

## 2021-04-26 DIAGNOSIS — G62.9 NEUROPATHY: ICD-10-CM

## 2021-04-26 DIAGNOSIS — N18.30 ANEMIA DUE TO STAGE 3 CHRONIC KIDNEY DISEASE, UNSPECIFIED WHETHER STAGE 3A OR 3B CKD: ICD-10-CM

## 2021-04-26 DIAGNOSIS — I13.0 HYPERTENSIVE HEART AND RENAL DISEASE WITH RENAL FAILURE, STAGE 1 THROUGH STAGE 4 OR UNSPECIFIED CHRONIC KIDNEY DISEASE, WITH HEART FAILURE: ICD-10-CM

## 2021-04-26 PROCEDURE — 1126F PR PAIN SEVERITY QUANTIFIED, NO PAIN PRESENT: ICD-10-PCS | Mod: S$GLB,,, | Performed by: NURSE PRACTITIONER

## 2021-04-26 PROCEDURE — 99215 OFFICE O/P EST HI 40 MIN: CPT | Mod: S$GLB,,, | Performed by: NURSE PRACTITIONER

## 2021-04-26 PROCEDURE — 1159F MED LIST DOCD IN RCRD: CPT | Mod: S$GLB,,, | Performed by: NURSE PRACTITIONER

## 2021-04-26 PROCEDURE — 1126F AMNT PAIN NOTED NONE PRSNT: CPT | Mod: S$GLB,,, | Performed by: NURSE PRACTITIONER

## 2021-04-26 PROCEDURE — 3075F SYST BP GE 130 - 139MM HG: CPT | Mod: CPTII,S$GLB,, | Performed by: NURSE PRACTITIONER

## 2021-04-26 PROCEDURE — 99215 PR OFFICE/OUTPT VISIT, EST, LEVL V, 40-54 MIN: ICD-10-PCS | Mod: S$GLB,,, | Performed by: NURSE PRACTITIONER

## 2021-04-26 PROCEDURE — 3008F PR BODY MASS INDEX (BMI) DOCUMENTED: ICD-10-PCS | Mod: CPTII,S$GLB,, | Performed by: NURSE PRACTITIONER

## 2021-04-26 PROCEDURE — 99999 PR PBB SHADOW E&M-EST. PATIENT-LVL V: CPT | Mod: PBBFAC,,, | Performed by: NURSE PRACTITIONER

## 2021-04-26 PROCEDURE — 3075F PR MOST RECENT SYSTOLIC BLOOD PRESS GE 130-139MM HG: ICD-10-PCS | Mod: CPTII,S$GLB,, | Performed by: NURSE PRACTITIONER

## 2021-04-26 PROCEDURE — 99499 UNLISTED E&M SERVICE: CPT | Mod: S$GLB,,, | Performed by: NURSE PRACTITIONER

## 2021-04-26 PROCEDURE — 99999 PR PBB SHADOW E&M-EST. PATIENT-LVL V: ICD-10-PCS | Mod: PBBFAC,,, | Performed by: NURSE PRACTITIONER

## 2021-04-26 PROCEDURE — 3078F PR MOST RECENT DIASTOLIC BLOOD PRESSURE < 80 MM HG: ICD-10-PCS | Mod: CPTII,S$GLB,, | Performed by: NURSE PRACTITIONER

## 2021-04-26 PROCEDURE — 3078F DIAST BP <80 MM HG: CPT | Mod: CPTII,S$GLB,, | Performed by: NURSE PRACTITIONER

## 2021-04-26 PROCEDURE — 99499 RISK ADDL DX/OHS AUDIT: ICD-10-PCS | Mod: S$GLB,,, | Performed by: NURSE PRACTITIONER

## 2021-04-26 PROCEDURE — 1159F PR MEDICATION LIST DOCUMENTED IN MEDICAL RECORD: ICD-10-PCS | Mod: S$GLB,,, | Performed by: NURSE PRACTITIONER

## 2021-04-26 PROCEDURE — 3008F BODY MASS INDEX DOCD: CPT | Mod: CPTII,S$GLB,, | Performed by: NURSE PRACTITIONER

## 2021-05-27 ENCOUNTER — PATIENT MESSAGE (OUTPATIENT)
Dept: NEPHROLOGY | Facility: CLINIC | Age: 66
End: 2021-05-27

## 2021-06-25 DIAGNOSIS — E87.6 HYPOKALEMIA: ICD-10-CM

## 2021-06-25 RX ORDER — POTASSIUM CHLORIDE 750 MG/1
TABLET, FILM COATED, EXTENDED RELEASE ORAL
Qty: 90 TABLET | Refills: 4 | Status: SHIPPED | OUTPATIENT
Start: 2021-06-25 | End: 2022-03-09 | Stop reason: SDUPTHER

## 2021-07-06 ENCOUNTER — PATIENT MESSAGE (OUTPATIENT)
Dept: ADMINISTRATIVE | Facility: HOSPITAL | Age: 66
End: 2021-07-06

## 2021-07-13 ENCOUNTER — LAB VISIT (OUTPATIENT)
Dept: LAB | Facility: HOSPITAL | Age: 66
End: 2021-07-13
Attending: INTERNAL MEDICINE
Payer: MEDICARE

## 2021-07-13 DIAGNOSIS — N18.32 STAGE 3B CHRONIC KIDNEY DISEASE: ICD-10-CM

## 2021-07-13 DIAGNOSIS — Z94.0 H/O KIDNEY TRANSPLANT: ICD-10-CM

## 2021-07-13 DIAGNOSIS — E55.9 VITAMIN D DEFICIENCY: ICD-10-CM

## 2021-07-13 DIAGNOSIS — R73.9 HYPERGLYCEMIA: ICD-10-CM

## 2021-07-13 LAB
25(OH)D3+25(OH)D2 SERPL-MCNC: 73 NG/ML (ref 30–96)
ALBUMIN SERPL BCP-MCNC: 3.9 G/DL (ref 3.5–5.2)
ALBUMIN SERPL BCP-MCNC: 3.9 G/DL (ref 3.5–5.2)
ALP SERPL-CCNC: 78 U/L (ref 55–135)
ALT SERPL W/O P-5'-P-CCNC: 20 U/L (ref 10–44)
ANION GAP SERPL CALC-SCNC: 15 MMOL/L (ref 8–16)
AST SERPL-CCNC: 22 U/L (ref 10–40)
BASOPHILS # BLD AUTO: 0.04 K/UL (ref 0–0.2)
BASOPHILS NFR BLD: 0.5 % (ref 0–1.9)
BILIRUB DIRECT SERPL-MCNC: 0.2 MG/DL (ref 0.1–0.3)
BILIRUB SERPL-MCNC: 0.4 MG/DL (ref 0.1–1)
BUN SERPL-MCNC: 26 MG/DL (ref 8–23)
CALCIUM SERPL-MCNC: 9.4 MG/DL (ref 8.7–10.5)
CHLORIDE SERPL-SCNC: 105 MMOL/L (ref 95–110)
CO2 SERPL-SCNC: 25 MMOL/L (ref 23–29)
CREAT SERPL-MCNC: 2.3 MG/DL (ref 0.5–1.4)
DIFFERENTIAL METHOD: ABNORMAL
EOSINOPHIL # BLD AUTO: 0.3 K/UL (ref 0–0.5)
EOSINOPHIL NFR BLD: 4.3 % (ref 0–8)
ERYTHROCYTE [DISTWIDTH] IN BLOOD BY AUTOMATED COUNT: 15.4 % (ref 11.5–14.5)
EST. GFR  (AFRICAN AMERICAN): 33 ML/MIN/1.73 M^2
EST. GFR  (NON AFRICAN AMERICAN): 28.5 ML/MIN/1.73 M^2
ESTIMATED AVG GLUCOSE: 111 MG/DL (ref 68–131)
GLUCOSE SERPL-MCNC: 93 MG/DL (ref 70–110)
HBA1C MFR BLD: 5.5 % (ref 4–5.6)
HCT VFR BLD AUTO: 41.8 % (ref 40–54)
HGB BLD-MCNC: 12.9 G/DL (ref 14–18)
IMM GRANULOCYTES # BLD AUTO: 0.03 K/UL (ref 0–0.04)
IMM GRANULOCYTES NFR BLD AUTO: 0.4 % (ref 0–0.5)
LYMPHOCYTES # BLD AUTO: 1.9 K/UL (ref 1–4.8)
LYMPHOCYTES NFR BLD: 23.9 % (ref 18–48)
MAGNESIUM SERPL-MCNC: 2 MG/DL (ref 1.6–2.6)
MCH RBC QN AUTO: 25.4 PG (ref 27–31)
MCHC RBC AUTO-ENTMCNC: 30.9 G/DL (ref 32–36)
MCV RBC AUTO: 82 FL (ref 82–98)
MONOCYTES # BLD AUTO: 0.6 K/UL (ref 0.3–1)
MONOCYTES NFR BLD: 8.2 % (ref 4–15)
NEUTROPHILS # BLD AUTO: 4.9 K/UL (ref 1.8–7.7)
NEUTROPHILS NFR BLD: 62.7 % (ref 38–73)
NRBC BLD-RTO: 0 /100 WBC
PHOSPHATE SERPL-MCNC: 4.1 MG/DL (ref 2.7–4.5)
PLATELET # BLD AUTO: 152 K/UL (ref 150–450)
PMV BLD AUTO: 13.5 FL (ref 9.2–12.9)
POTASSIUM SERPL-SCNC: 3.7 MMOL/L (ref 3.5–5.1)
PROT SERPL-MCNC: 7.8 G/DL (ref 6–8.4)
PTH-INTACT SERPL-MCNC: 41 PG/ML (ref 9–77)
RBC # BLD AUTO: 5.08 M/UL (ref 4.6–6.2)
SODIUM SERPL-SCNC: 145 MMOL/L (ref 136–145)
TACROLIMUS BLD-MCNC: 4.5 NG/ML (ref 5–15)
TACROLIMUS, NORMALIZED: 4.2 NG/ML (ref 5–15)
URATE SERPL-MCNC: 9.3 MG/DL (ref 3.4–7)
WBC # BLD AUTO: 7.82 K/UL (ref 3.9–12.7)

## 2021-07-13 PROCEDURE — 83735 ASSAY OF MAGNESIUM: CPT | Performed by: INTERNAL MEDICINE

## 2021-07-13 PROCEDURE — 84550 ASSAY OF BLOOD/URIC ACID: CPT | Performed by: INTERNAL MEDICINE

## 2021-07-13 PROCEDURE — 85025 COMPLETE CBC W/AUTO DIFF WBC: CPT | Performed by: INTERNAL MEDICINE

## 2021-07-13 PROCEDURE — 82306 VITAMIN D 25 HYDROXY: CPT | Performed by: INTERNAL MEDICINE

## 2021-07-13 PROCEDURE — 83970 ASSAY OF PARATHORMONE: CPT | Performed by: INTERNAL MEDICINE

## 2021-07-13 PROCEDURE — 80069 RENAL FUNCTION PANEL: CPT | Performed by: INTERNAL MEDICINE

## 2021-07-13 PROCEDURE — 82247 BILIRUBIN TOTAL: CPT | Performed by: INTERNAL MEDICINE

## 2021-07-13 PROCEDURE — 84460 ALANINE AMINO (ALT) (SGPT): CPT | Performed by: INTERNAL MEDICINE

## 2021-07-13 PROCEDURE — 83036 HEMOGLOBIN GLYCOSYLATED A1C: CPT | Performed by: INTERNAL MEDICINE

## 2021-07-13 PROCEDURE — 80197 ASSAY OF TACROLIMUS: CPT | Performed by: INTERNAL MEDICINE

## 2021-07-13 PROCEDURE — 36415 COLL VENOUS BLD VENIPUNCTURE: CPT | Performed by: INTERNAL MEDICINE

## 2021-07-13 PROCEDURE — 84075 ASSAY ALKALINE PHOSPHATASE: CPT | Performed by: INTERNAL MEDICINE

## 2021-07-20 ENCOUNTER — OFFICE VISIT (OUTPATIENT)
Dept: NEPHROLOGY | Facility: CLINIC | Age: 66
End: 2021-07-20
Payer: MEDICARE

## 2021-07-20 VITALS
WEIGHT: 233.44 LBS | SYSTOLIC BLOOD PRESSURE: 138 MMHG | HEIGHT: 73 IN | HEART RATE: 55 BPM | BODY MASS INDEX: 30.94 KG/M2 | OXYGEN SATURATION: 98 % | DIASTOLIC BLOOD PRESSURE: 78 MMHG

## 2021-07-20 DIAGNOSIS — E55.9 VITAMIN D DEFICIENCY: ICD-10-CM

## 2021-07-20 DIAGNOSIS — N18.32 ANEMIA DUE TO STAGE 3B CHRONIC KIDNEY DISEASE: ICD-10-CM

## 2021-07-20 DIAGNOSIS — R73.9 HYPERGLYCEMIA: ICD-10-CM

## 2021-07-20 DIAGNOSIS — Z94.0 H/O KIDNEY TRANSPLANT: ICD-10-CM

## 2021-07-20 DIAGNOSIS — N25.81 SECONDARY RENAL HYPERPARATHYROIDISM: ICD-10-CM

## 2021-07-20 DIAGNOSIS — K57.90 DIVERTICULOSIS: ICD-10-CM

## 2021-07-20 DIAGNOSIS — R73.03 PREDIABETES: ICD-10-CM

## 2021-07-20 DIAGNOSIS — Z94.0 KIDNEY REPLACED BY TRANSPLANT: ICD-10-CM

## 2021-07-20 DIAGNOSIS — E66.9 OBESITY (BMI 30-39.9): ICD-10-CM

## 2021-07-20 DIAGNOSIS — I48.0 PAROXYSMAL ATRIAL FIBRILLATION: ICD-10-CM

## 2021-07-20 DIAGNOSIS — I50.32 CHRONIC HEART FAILURE WITH PRESERVED EJECTION FRACTION: ICD-10-CM

## 2021-07-20 DIAGNOSIS — I50.30 HEART FAILURE WITH PRESERVED EJECTION FRACTION, UNSPECIFIED HF CHRONICITY: ICD-10-CM

## 2021-07-20 DIAGNOSIS — D50.8 OTHER IRON DEFICIENCY ANEMIA: ICD-10-CM

## 2021-07-20 DIAGNOSIS — R09.89 CAROTID BRUIT, UNSPECIFIED LATERALITY: ICD-10-CM

## 2021-07-20 DIAGNOSIS — R91.8 MULTIPLE LUNG NODULES ON CT: ICD-10-CM

## 2021-07-20 DIAGNOSIS — Z86.79 HISTORY OF PERICARDITIS: ICD-10-CM

## 2021-07-20 DIAGNOSIS — I50.32 CHRONIC DIASTOLIC HEART FAILURE: ICD-10-CM

## 2021-07-20 DIAGNOSIS — N18.32 STAGE 3B CHRONIC KIDNEY DISEASE: Primary | ICD-10-CM

## 2021-07-20 DIAGNOSIS — I25.10 CORONARY ARTERY DISEASE INVOLVING NATIVE CORONARY ARTERY OF NATIVE HEART WITHOUT ANGINA PECTORIS: ICD-10-CM

## 2021-07-20 DIAGNOSIS — M89.8X9 METABOLIC BONE DISEASE: ICD-10-CM

## 2021-07-20 DIAGNOSIS — E78.2 MIXED HYPERLIPIDEMIA: ICD-10-CM

## 2021-07-20 DIAGNOSIS — Z94.0 IMMUNOSUPPRESSIVE MANAGEMENT ENCOUNTER FOLLOWING KIDNEY TRANSPLANT: ICD-10-CM

## 2021-07-20 DIAGNOSIS — D63.1 ANEMIA DUE TO STAGE 3B CHRONIC KIDNEY DISEASE: ICD-10-CM

## 2021-07-20 DIAGNOSIS — I47.10 SUPRAVENTRICULAR TACHYCARDIA: ICD-10-CM

## 2021-07-20 DIAGNOSIS — Z79.01 LONG TERM (CURRENT) USE OF ANTICOAGULANTS: ICD-10-CM

## 2021-07-20 DIAGNOSIS — Z79.899 IMMUNOSUPPRESSIVE MANAGEMENT ENCOUNTER FOLLOWING KIDNEY TRANSPLANT: ICD-10-CM

## 2021-07-20 DIAGNOSIS — I13.0 HYPERTENSIVE HEART AND RENAL DISEASE WITH RENAL FAILURE, STAGE 1 THROUGH STAGE 4 OR UNSPECIFIED CHRONIC KIDNEY DISEASE, WITH HEART FAILURE: ICD-10-CM

## 2021-07-20 DIAGNOSIS — E11.9 TYPE 2 DIABETES MELLITUS WITHOUT COMPLICATION, WITHOUT LONG-TERM CURRENT USE OF INSULIN: ICD-10-CM

## 2021-07-20 DIAGNOSIS — Z72.0 TOBACCO USE: ICD-10-CM

## 2021-07-20 PROCEDURE — 1159F PR MEDICATION LIST DOCUMENTED IN MEDICAL RECORD: ICD-10-PCS | Mod: CPTII,S$GLB,, | Performed by: INTERNAL MEDICINE

## 2021-07-20 PROCEDURE — 99999 PR PBB SHADOW E&M-EST. PATIENT-LVL V: CPT | Mod: PBBFAC,,, | Performed by: INTERNAL MEDICINE

## 2021-07-20 PROCEDURE — 3008F BODY MASS INDEX DOCD: CPT | Mod: CPTII,S$GLB,, | Performed by: INTERNAL MEDICINE

## 2021-07-20 PROCEDURE — 1125F AMNT PAIN NOTED PAIN PRSNT: CPT | Mod: CPTII,S$GLB,, | Performed by: INTERNAL MEDICINE

## 2021-07-20 PROCEDURE — 99215 OFFICE O/P EST HI 40 MIN: CPT | Mod: S$GLB,,, | Performed by: INTERNAL MEDICINE

## 2021-07-20 PROCEDURE — 3075F SYST BP GE 130 - 139MM HG: CPT | Mod: CPTII,S$GLB,, | Performed by: INTERNAL MEDICINE

## 2021-07-20 PROCEDURE — 99215 PR OFFICE/OUTPT VISIT, EST, LEVL V, 40-54 MIN: ICD-10-PCS | Mod: S$GLB,,, | Performed by: INTERNAL MEDICINE

## 2021-07-20 PROCEDURE — 3044F HG A1C LEVEL LT 7.0%: CPT | Mod: CPTII,S$GLB,, | Performed by: INTERNAL MEDICINE

## 2021-07-20 PROCEDURE — 1159F MED LIST DOCD IN RCRD: CPT | Mod: CPTII,S$GLB,, | Performed by: INTERNAL MEDICINE

## 2021-07-20 PROCEDURE — 99499 RISK ADDL DX/OHS AUDIT: ICD-10-PCS | Mod: S$GLB,,, | Performed by: INTERNAL MEDICINE

## 2021-07-20 PROCEDURE — 3008F PR BODY MASS INDEX (BMI) DOCUMENTED: ICD-10-PCS | Mod: CPTII,S$GLB,, | Performed by: INTERNAL MEDICINE

## 2021-07-20 PROCEDURE — 3078F DIAST BP <80 MM HG: CPT | Mod: CPTII,S$GLB,, | Performed by: INTERNAL MEDICINE

## 2021-07-20 PROCEDURE — 1125F PR PAIN SEVERITY QUANTIFIED, PAIN PRESENT: ICD-10-PCS | Mod: CPTII,S$GLB,, | Performed by: INTERNAL MEDICINE

## 2021-07-20 PROCEDURE — 3044F PR MOST RECENT HEMOGLOBIN A1C LEVEL <7.0%: ICD-10-PCS | Mod: CPTII,S$GLB,, | Performed by: INTERNAL MEDICINE

## 2021-07-20 PROCEDURE — 3288F FALL RISK ASSESSMENT DOCD: CPT | Mod: CPTII,S$GLB,, | Performed by: INTERNAL MEDICINE

## 2021-07-20 PROCEDURE — 3078F PR MOST RECENT DIASTOLIC BLOOD PRESSURE < 80 MM HG: ICD-10-PCS | Mod: CPTII,S$GLB,, | Performed by: INTERNAL MEDICINE

## 2021-07-20 PROCEDURE — 99999 PR PBB SHADOW E&M-EST. PATIENT-LVL V: ICD-10-PCS | Mod: PBBFAC,,, | Performed by: INTERNAL MEDICINE

## 2021-07-20 PROCEDURE — 1101F PT FALLS ASSESS-DOCD LE1/YR: CPT | Mod: CPTII,S$GLB,, | Performed by: INTERNAL MEDICINE

## 2021-07-20 PROCEDURE — 99499 UNLISTED E&M SERVICE: CPT | Mod: S$GLB,,, | Performed by: INTERNAL MEDICINE

## 2021-07-20 PROCEDURE — 3075F PR MOST RECENT SYSTOLIC BLOOD PRESS GE 130-139MM HG: ICD-10-PCS | Mod: CPTII,S$GLB,, | Performed by: INTERNAL MEDICINE

## 2021-07-20 PROCEDURE — 3288F PR FALLS RISK ASSESSMENT DOCUMENTED: ICD-10-PCS | Mod: CPTII,S$GLB,, | Performed by: INTERNAL MEDICINE

## 2021-07-20 PROCEDURE — 1101F PR PT FALLS ASSESS DOC 0-1 FALLS W/OUT INJ PAST YR: ICD-10-PCS | Mod: CPTII,S$GLB,, | Performed by: INTERNAL MEDICINE

## 2021-07-20 RX ORDER — TACROLIMUS 1 MG/1
CAPSULE ORAL
Qty: 270 CAPSULE | Refills: 3 | Status: SHIPPED | OUTPATIENT
Start: 2021-07-20 | End: 2022-02-24

## 2021-07-20 RX ORDER — TACROLIMUS 1 MG/1
CAPSULE ORAL
Qty: 270 CAPSULE | Refills: 3
Start: 2021-07-20 | End: 2021-07-20 | Stop reason: SDUPTHER

## 2021-07-20 RX ORDER — HYDRALAZINE HYDROCHLORIDE 50 MG/1
50 TABLET, FILM COATED ORAL 3 TIMES DAILY
Qty: 270 TABLET | Refills: 1 | Status: SHIPPED | OUTPATIENT
Start: 2021-07-20 | End: 2022-02-01

## 2021-08-04 ENCOUNTER — PATIENT MESSAGE (OUTPATIENT)
Dept: NEPHROLOGY | Facility: CLINIC | Age: 66
End: 2021-08-04

## 2021-08-15 ENCOUNTER — HOSPITAL ENCOUNTER (EMERGENCY)
Facility: HOSPITAL | Age: 66
Discharge: HOME | End: 2021-08-15
Attending: EMERGENCY MEDICINE
Payer: MEDICARE

## 2021-08-15 VITALS
WEIGHT: 240 LBS | OXYGEN SATURATION: 100 % | RESPIRATION RATE: 15 BRPM | SYSTOLIC BLOOD PRESSURE: 175 MMHG | HEART RATE: 56 BPM | BODY MASS INDEX: 31.66 KG/M2 | TEMPERATURE: 98 F | DIASTOLIC BLOOD PRESSURE: 81 MMHG

## 2021-08-15 DIAGNOSIS — R06.09 DYSPNEA ON EXERTION: ICD-10-CM

## 2021-08-15 DIAGNOSIS — R79.89 ELEVATED TROPONIN: Primary | ICD-10-CM

## 2021-08-15 DIAGNOSIS — I10 HYPERTENSION, UNSPECIFIED TYPE: ICD-10-CM

## 2021-08-15 DIAGNOSIS — R06.02 SOB (SHORTNESS OF BREATH): ICD-10-CM

## 2021-08-15 DIAGNOSIS — I13.0 HYPERTENSIVE HEART AND RENAL DISEASE WITH RENAL FAILURE, STAGE 1 THROUGH STAGE 4 OR UNSPECIFIED CHRONIC KIDNEY DISEASE, WITH HEART FAILURE: ICD-10-CM

## 2021-08-15 LAB
ALBUMIN SERPL BCP-MCNC: 3.2 G/DL (ref 3.5–5.2)
ALP SERPL-CCNC: 63 U/L (ref 55–135)
ALT SERPL W/O P-5'-P-CCNC: 16 U/L (ref 10–44)
ANION GAP SERPL CALC-SCNC: 9 MMOL/L (ref 8–16)
AST SERPL-CCNC: 20 U/L (ref 10–40)
BASOPHILS # BLD AUTO: 0.02 K/UL (ref 0–0.2)
BASOPHILS NFR BLD: 0.3 % (ref 0–1.9)
BILIRUB SERPL-MCNC: 0.3 MG/DL (ref 0.1–1)
BNP SERPL-MCNC: 227 PG/ML (ref 0–99)
BUN SERPL-MCNC: 19 MG/DL (ref 8–23)
CALCIUM SERPL-MCNC: 8.2 MG/DL (ref 8.7–10.5)
CHLORIDE SERPL-SCNC: 106 MMOL/L (ref 95–110)
CO2 SERPL-SCNC: 25 MMOL/L (ref 23–29)
CREAT SERPL-MCNC: 2 MG/DL (ref 0.5–1.4)
CTP QC/QA: YES
DIFFERENTIAL METHOD: ABNORMAL
EOSINOPHIL # BLD AUTO: 0.3 K/UL (ref 0–0.5)
EOSINOPHIL NFR BLD: 5.2 % (ref 0–8)
ERYTHROCYTE [DISTWIDTH] IN BLOOD BY AUTOMATED COUNT: 14.8 % (ref 11.5–14.5)
EST. GFR  (AFRICAN AMERICAN): 39 ML/MIN/1.73 M^2
EST. GFR  (NON AFRICAN AMERICAN): 33.8 ML/MIN/1.73 M^2
GLUCOSE SERPL-MCNC: 92 MG/DL (ref 70–110)
HCT VFR BLD AUTO: 37.3 % (ref 40–54)
HGB BLD-MCNC: 11.6 G/DL (ref 14–18)
IMM GRANULOCYTES # BLD AUTO: 0.03 K/UL (ref 0–0.04)
IMM GRANULOCYTES NFR BLD AUTO: 0.5 % (ref 0–0.5)
LYMPHOCYTES # BLD AUTO: 1.4 K/UL (ref 1–4.8)
LYMPHOCYTES NFR BLD: 22.1 % (ref 18–48)
MCH RBC QN AUTO: 25.1 PG (ref 27–31)
MCHC RBC AUTO-ENTMCNC: 31.1 G/DL (ref 32–36)
MCV RBC AUTO: 81 FL (ref 82–98)
MONOCYTES # BLD AUTO: 0.7 K/UL (ref 0.3–1)
MONOCYTES NFR BLD: 10.7 % (ref 4–15)
NEUTROPHILS # BLD AUTO: 3.9 K/UL (ref 1.8–7.7)
NEUTROPHILS NFR BLD: 61.2 % (ref 38–73)
NRBC BLD-RTO: 0 /100 WBC
PLATELET # BLD AUTO: 178 K/UL (ref 150–450)
PMV BLD AUTO: 12.2 FL (ref 9.2–12.9)
POTASSIUM SERPL-SCNC: 3.7 MMOL/L (ref 3.5–5.1)
PROT SERPL-MCNC: 6.2 G/DL (ref 6–8.4)
RBC # BLD AUTO: 4.63 M/UL (ref 4.6–6.2)
SARS-COV-2 RDRP RESP QL NAA+PROBE: NEGATIVE
SODIUM SERPL-SCNC: 140 MMOL/L (ref 136–145)
TROPONIN I SERPL DL<=0.01 NG/ML-MCNC: 0.05 NG/ML (ref 0–0.03)
TROPONIN I SERPL DL<=0.01 NG/ML-MCNC: 0.06 NG/ML (ref 0–0.03)
WBC # BLD AUTO: 6.34 K/UL (ref 3.9–12.7)

## 2021-08-15 PROCEDURE — 25000003 PHARM REV CODE 250: Performed by: EMERGENCY MEDICINE

## 2021-08-15 PROCEDURE — 93010 ELECTROCARDIOGRAM REPORT: CPT | Mod: ,,, | Performed by: INTERNAL MEDICINE

## 2021-08-15 PROCEDURE — 80053 COMPREHEN METABOLIC PANEL: CPT | Performed by: STUDENT IN AN ORGANIZED HEALTH CARE EDUCATION/TRAINING PROGRAM

## 2021-08-15 PROCEDURE — 99285 PR EMERGENCY DEPT VISIT,LEVEL V: ICD-10-PCS | Mod: GC,CS,, | Performed by: EMERGENCY MEDICINE

## 2021-08-15 PROCEDURE — 93005 ELECTROCARDIOGRAM TRACING: CPT

## 2021-08-15 PROCEDURE — U0002 COVID-19 LAB TEST NON-CDC: HCPCS | Performed by: EMERGENCY MEDICINE

## 2021-08-15 PROCEDURE — 85025 COMPLETE CBC W/AUTO DIFF WBC: CPT | Performed by: STUDENT IN AN ORGANIZED HEALTH CARE EDUCATION/TRAINING PROGRAM

## 2021-08-15 PROCEDURE — 83880 ASSAY OF NATRIURETIC PEPTIDE: CPT | Performed by: STUDENT IN AN ORGANIZED HEALTH CARE EDUCATION/TRAINING PROGRAM

## 2021-08-15 PROCEDURE — 63600175 PHARM REV CODE 636 W HCPCS: Performed by: STUDENT IN AN ORGANIZED HEALTH CARE EDUCATION/TRAINING PROGRAM

## 2021-08-15 PROCEDURE — 93010 EKG 12-LEAD: ICD-10-PCS | Mod: ,,, | Performed by: INTERNAL MEDICINE

## 2021-08-15 PROCEDURE — 99285 EMERGENCY DEPT VISIT HI MDM: CPT

## 2021-08-15 PROCEDURE — 84484 ASSAY OF TROPONIN QUANT: CPT | Mod: 91 | Performed by: STUDENT IN AN ORGANIZED HEALTH CARE EDUCATION/TRAINING PROGRAM

## 2021-08-15 PROCEDURE — 99285 EMERGENCY DEPT VISIT HI MDM: CPT | Mod: GC,CS,, | Performed by: EMERGENCY MEDICINE

## 2021-08-15 RX ORDER — NIFEDIPINE 60 MG/1
120 TABLET, EXTENDED RELEASE ORAL DAILY
Qty: 60 TABLET | Refills: 11 | Status: SHIPPED | OUTPATIENT
Start: 2021-08-15 | End: 2022-04-21 | Stop reason: SDUPTHER

## 2021-08-15 RX ORDER — TACROLIMUS 1 MG/1
2 CAPSULE ORAL ONCE
Status: COMPLETED | OUTPATIENT
Start: 2021-08-15 | End: 2021-08-15

## 2021-08-15 RX ORDER — ASPIRIN 325 MG
325 TABLET ORAL
Status: COMPLETED | OUTPATIENT
Start: 2021-08-15 | End: 2021-08-15

## 2021-08-15 RX ORDER — MYCOPHENOLATE MOFETIL 250 MG/1
500 CAPSULE ORAL
Status: COMPLETED | OUTPATIENT
Start: 2021-08-15 | End: 2021-08-15

## 2021-08-15 RX ADMIN — MYCOPHENOLATE MOFETIL 500 MG: 250 CAPSULE ORAL at 12:08

## 2021-08-15 RX ADMIN — ASPIRIN 325 MG ORAL TABLET 325 MG: 325 PILL ORAL at 05:08

## 2021-08-15 RX ADMIN — TACROLIMUS 2 MG: 1 CAPSULE ORAL at 12:08

## 2021-08-26 ENCOUNTER — PATIENT MESSAGE (OUTPATIENT)
Dept: INTERNAL MEDICINE | Facility: CLINIC | Age: 66
End: 2021-08-26

## 2021-09-16 ENCOUNTER — OFFICE VISIT (OUTPATIENT)
Dept: NEUROLOGY | Facility: CLINIC | Age: 66
End: 2021-09-16
Payer: MEDICARE

## 2021-09-16 ENCOUNTER — LAB VISIT (OUTPATIENT)
Dept: LAB | Facility: HOSPITAL | Age: 66
End: 2021-09-16
Attending: PSYCHIATRY & NEUROLOGY
Payer: MEDICARE

## 2021-09-16 VITALS — HEIGHT: 73 IN | WEIGHT: 233.69 LBS | BODY MASS INDEX: 30.97 KG/M2

## 2021-09-16 DIAGNOSIS — G60.3 IDIOPATHIC PROGRESSIVE NEUROPATHY: ICD-10-CM

## 2021-09-16 DIAGNOSIS — G62.9 POLYNEUROPATHY: ICD-10-CM

## 2021-09-16 DIAGNOSIS — G25.2 POSTURAL TREMOR: Primary | ICD-10-CM

## 2021-09-16 DIAGNOSIS — Z79.899 IMMUNOSUPPRESSIVE MANAGEMENT ENCOUNTER FOLLOWING KIDNEY TRANSPLANT: ICD-10-CM

## 2021-09-16 DIAGNOSIS — G25.2 POSTURAL TREMOR: ICD-10-CM

## 2021-09-16 DIAGNOSIS — Z94.0 KIDNEY REPLACED BY TRANSPLANT: ICD-10-CM

## 2021-09-16 DIAGNOSIS — Z94.0 IMMUNOSUPPRESSIVE MANAGEMENT ENCOUNTER FOLLOWING KIDNEY TRANSPLANT: ICD-10-CM

## 2021-09-16 DIAGNOSIS — G62.9 NEUROPATHY: ICD-10-CM

## 2021-09-16 DIAGNOSIS — R25.1 TREMOR OF RIGHT HAND: ICD-10-CM

## 2021-09-16 LAB
FOLATE SERPL-MCNC: 13.4 NG/ML (ref 4–24)
T4 FREE SERPL-MCNC: 0.96 NG/DL (ref 0.71–1.51)
TSH SERPL DL<=0.005 MIU/L-ACNC: 1.26 UIU/ML (ref 0.4–4)
VIT B12 SERPL-MCNC: 407 PG/ML (ref 210–950)

## 2021-09-16 PROCEDURE — 3288F PR FALLS RISK ASSESSMENT DOCUMENTED: ICD-10-PCS | Mod: CPTII,S$GLB,, | Performed by: PSYCHIATRY & NEUROLOGY

## 2021-09-16 PROCEDURE — 3066F NEPHROPATHY DOC TX: CPT | Mod: CPTII,S$GLB,, | Performed by: PSYCHIATRY & NEUROLOGY

## 2021-09-16 PROCEDURE — 1101F PR PT FALLS ASSESS DOC 0-1 FALLS W/OUT INJ PAST YR: ICD-10-PCS | Mod: CPTII,S$GLB,, | Performed by: PSYCHIATRY & NEUROLOGY

## 2021-09-16 PROCEDURE — 82746 ASSAY OF FOLIC ACID SERUM: CPT | Performed by: PSYCHIATRY & NEUROLOGY

## 2021-09-16 PROCEDURE — 1159F MED LIST DOCD IN RCRD: CPT | Mod: CPTII,S$GLB,, | Performed by: PSYCHIATRY & NEUROLOGY

## 2021-09-16 PROCEDURE — 99999 PR PBB SHADOW E&M-EST. PATIENT-LVL III: CPT | Mod: PBBFAC,,, | Performed by: PSYCHIATRY & NEUROLOGY

## 2021-09-16 PROCEDURE — 3044F PR MOST RECENT HEMOGLOBIN A1C LEVEL <7.0%: ICD-10-PCS | Mod: CPTII,S$GLB,, | Performed by: PSYCHIATRY & NEUROLOGY

## 2021-09-16 PROCEDURE — 3288F FALL RISK ASSESSMENT DOCD: CPT | Mod: CPTII,S$GLB,, | Performed by: PSYCHIATRY & NEUROLOGY

## 2021-09-16 PROCEDURE — 84439 ASSAY OF FREE THYROXINE: CPT | Performed by: PSYCHIATRY & NEUROLOGY

## 2021-09-16 PROCEDURE — 1159F PR MEDICATION LIST DOCUMENTED IN MEDICAL RECORD: ICD-10-PCS | Mod: CPTII,S$GLB,, | Performed by: PSYCHIATRY & NEUROLOGY

## 2021-09-16 PROCEDURE — 3008F PR BODY MASS INDEX (BMI) DOCUMENTED: ICD-10-PCS | Mod: CPTII,S$GLB,, | Performed by: PSYCHIATRY & NEUROLOGY

## 2021-09-16 PROCEDURE — 36415 COLL VENOUS BLD VENIPUNCTURE: CPT | Performed by: PSYCHIATRY & NEUROLOGY

## 2021-09-16 PROCEDURE — 1126F AMNT PAIN NOTED NONE PRSNT: CPT | Mod: CPTII,S$GLB,, | Performed by: PSYCHIATRY & NEUROLOGY

## 2021-09-16 PROCEDURE — 99499 UNLISTED E&M SERVICE: CPT | Mod: S$GLB,,, | Performed by: PSYCHIATRY & NEUROLOGY

## 2021-09-16 PROCEDURE — 99205 OFFICE O/P NEW HI 60 MIN: CPT | Mod: S$GLB,,, | Performed by: PSYCHIATRY & NEUROLOGY

## 2021-09-16 PROCEDURE — 99205 PR OFFICE/OUTPT VISIT, NEW, LEVL V, 60-74 MIN: ICD-10-PCS | Mod: S$GLB,,, | Performed by: PSYCHIATRY & NEUROLOGY

## 2021-09-16 PROCEDURE — 1126F PR PAIN SEVERITY QUANTIFIED, NO PAIN PRESENT: ICD-10-PCS | Mod: CPTII,S$GLB,, | Performed by: PSYCHIATRY & NEUROLOGY

## 2021-09-16 PROCEDURE — 82607 VITAMIN B-12: CPT | Performed by: PSYCHIATRY & NEUROLOGY

## 2021-09-16 PROCEDURE — 99499 RISK ADDL DX/OHS AUDIT: ICD-10-PCS | Mod: S$GLB,,, | Performed by: PSYCHIATRY & NEUROLOGY

## 2021-09-16 PROCEDURE — 84443 ASSAY THYROID STIM HORMONE: CPT | Performed by: PSYCHIATRY & NEUROLOGY

## 2021-09-16 PROCEDURE — 3066F PR DOCUMENTATION OF TREATMENT FOR NEPHROPATHY: ICD-10-PCS | Mod: CPTII,S$GLB,, | Performed by: PSYCHIATRY & NEUROLOGY

## 2021-09-16 PROCEDURE — 3008F BODY MASS INDEX DOCD: CPT | Mod: CPTII,S$GLB,, | Performed by: PSYCHIATRY & NEUROLOGY

## 2021-09-16 PROCEDURE — 1101F PT FALLS ASSESS-DOCD LE1/YR: CPT | Mod: CPTII,S$GLB,, | Performed by: PSYCHIATRY & NEUROLOGY

## 2021-09-16 PROCEDURE — 99999 PR PBB SHADOW E&M-EST. PATIENT-LVL III: ICD-10-PCS | Mod: PBBFAC,,, | Performed by: PSYCHIATRY & NEUROLOGY

## 2021-09-16 PROCEDURE — 3044F HG A1C LEVEL LT 7.0%: CPT | Mod: CPTII,S$GLB,, | Performed by: PSYCHIATRY & NEUROLOGY

## 2021-09-16 PROCEDURE — 83921 ORGANIC ACID SINGLE QUANT: CPT | Performed by: PSYCHIATRY & NEUROLOGY

## 2021-09-21 ENCOUNTER — PATIENT MESSAGE (OUTPATIENT)
Dept: NEUROLOGY | Facility: CLINIC | Age: 66
End: 2021-09-21

## 2021-09-21 LAB — METHYLMALONATE SERPL-SCNC: 0.38 UMOL/L

## 2021-10-04 ENCOUNTER — PATIENT MESSAGE (OUTPATIENT)
Dept: ADMINISTRATIVE | Facility: HOSPITAL | Age: 66
End: 2021-10-04

## 2021-10-13 DIAGNOSIS — E11.9 TYPE 2 DIABETES MELLITUS WITHOUT COMPLICATION: ICD-10-CM

## 2021-10-15 ENCOUNTER — TELEPHONE (OUTPATIENT)
Dept: NEPHROLOGY | Facility: CLINIC | Age: 66
End: 2021-10-15

## 2021-10-20 NOTE — PROGRESS NOTES
INR not at goal. Medications, chart, and patient findings reviewed. See calendar for adjustments to dose and follow up plan.  Findings: Pt continues to have a little pain from his gout & denies any other changes.  Pt remains supratherapuetic this week.  Will instruct pt to hold coumadin 8/29 & decrease maintenance regimen.  Plan to re-assess in 2 weeks.   Patient was re-educated on situations that would require placing a call to the Coumadin Clinic, including bleeding or unusual bruising issues, changes in health, diet or medications,upcoming procedures that require warfarin interruption, and missed Coumadin dose(s). Patient expressed understanding that avoidance of consistency with these parameters could cause fluctuations in INR, leading to more frequent visits and increase risk of adverse events.        
7 to 9

## 2021-10-25 ENCOUNTER — PATIENT MESSAGE (OUTPATIENT)
Dept: NEPHROLOGY | Facility: CLINIC | Age: 66
End: 2021-10-25
Payer: MEDICARE

## 2021-10-28 RX ORDER — FAMOTIDINE 20 MG/1
20 TABLET, FILM COATED ORAL 2 TIMES DAILY
Qty: 180 TABLET | Refills: 1 | Status: SHIPPED | OUTPATIENT
Start: 2021-10-28 | End: 2022-02-01

## 2021-11-10 ENCOUNTER — TELEPHONE (OUTPATIENT)
Dept: NEPHROLOGY | Facility: CLINIC | Age: 66
End: 2021-11-10
Payer: MEDICARE

## 2021-11-10 ENCOUNTER — PATIENT MESSAGE (OUTPATIENT)
Dept: NEPHROLOGY | Facility: CLINIC | Age: 66
End: 2021-11-10
Payer: MEDICARE

## 2021-11-23 ENCOUNTER — IMMUNIZATION (OUTPATIENT)
Dept: INTERNAL MEDICINE | Facility: CLINIC | Age: 66
End: 2021-11-23
Payer: MEDICARE

## 2021-11-23 PROCEDURE — 90694 FLU VACCINE - QUADRIVALENT - ADJUVANTED: ICD-10-PCS | Mod: S$GLB,,, | Performed by: INTERNAL MEDICINE

## 2021-11-23 PROCEDURE — G0008 FLU VACCINE - QUADRIVALENT - ADJUVANTED: ICD-10-PCS | Mod: S$GLB,,, | Performed by: INTERNAL MEDICINE

## 2021-11-23 PROCEDURE — 90694 VACC AIIV4 NO PRSRV 0.5ML IM: CPT | Mod: S$GLB,,, | Performed by: INTERNAL MEDICINE

## 2021-11-23 PROCEDURE — G0008 ADMIN INFLUENZA VIRUS VAC: HCPCS | Mod: S$GLB,,, | Performed by: INTERNAL MEDICINE

## 2022-01-03 ENCOUNTER — LAB VISIT (OUTPATIENT)
Dept: LAB | Facility: HOSPITAL | Age: 67
End: 2022-01-03
Attending: INTERNAL MEDICINE
Payer: MEDICARE

## 2022-01-03 ENCOUNTER — PATIENT MESSAGE (OUTPATIENT)
Dept: NEPHROLOGY | Facility: CLINIC | Age: 67
End: 2022-01-03
Payer: MEDICARE

## 2022-01-03 DIAGNOSIS — Z94.0 KIDNEY REPLACED BY TRANSPLANT: ICD-10-CM

## 2022-01-03 DIAGNOSIS — R73.03 PREDIABETES: ICD-10-CM

## 2022-01-03 LAB
ALBUMIN SERPL BCP-MCNC: 3.4 G/DL (ref 3.5–5.2)
ALBUMIN SERPL BCP-MCNC: 3.4 G/DL (ref 3.5–5.2)
ALP SERPL-CCNC: 64 U/L (ref 55–135)
ALT SERPL W/O P-5'-P-CCNC: 24 U/L (ref 10–44)
ANION GAP SERPL CALC-SCNC: 10 MMOL/L (ref 8–16)
AST SERPL-CCNC: 25 U/L (ref 10–40)
BASOPHILS # BLD AUTO: 0.03 K/UL (ref 0–0.2)
BASOPHILS NFR BLD: 0.5 % (ref 0–1.9)
BILIRUB DIRECT SERPL-MCNC: 0.2 MG/DL (ref 0.1–0.3)
BILIRUB SERPL-MCNC: 0.5 MG/DL (ref 0.1–1)
BUN SERPL-MCNC: 25 MG/DL (ref 8–23)
CALCIUM SERPL-MCNC: 9 MG/DL (ref 8.7–10.5)
CHLORIDE SERPL-SCNC: 106 MMOL/L (ref 95–110)
CO2 SERPL-SCNC: 25 MMOL/L (ref 23–29)
CREAT SERPL-MCNC: 2.3 MG/DL (ref 0.5–1.4)
DIFFERENTIAL METHOD: ABNORMAL
EOSINOPHIL # BLD AUTO: 0.4 K/UL (ref 0–0.5)
EOSINOPHIL NFR BLD: 5.8 % (ref 0–8)
ERYTHROCYTE [DISTWIDTH] IN BLOOD BY AUTOMATED COUNT: 15.3 % (ref 11.5–14.5)
EST. GFR  (AFRICAN AMERICAN): 32.7 ML/MIN/1.73 M^2
EST. GFR  (NON AFRICAN AMERICAN): 28.3 ML/MIN/1.73 M^2
ESTIMATED AVG GLUCOSE: 105 MG/DL (ref 68–131)
GLUCOSE SERPL-MCNC: 94 MG/DL (ref 70–110)
HBA1C MFR BLD: 5.3 % (ref 4–5.6)
HCT VFR BLD AUTO: 40.3 % (ref 40–54)
HGB BLD-MCNC: 12.3 G/DL (ref 14–18)
IMM GRANULOCYTES # BLD AUTO: 0.02 K/UL (ref 0–0.04)
IMM GRANULOCYTES NFR BLD AUTO: 0.3 % (ref 0–0.5)
LYMPHOCYTES # BLD AUTO: 1.5 K/UL (ref 1–4.8)
LYMPHOCYTES NFR BLD: 25.2 % (ref 18–48)
MCH RBC QN AUTO: 24.9 PG (ref 27–31)
MCHC RBC AUTO-ENTMCNC: 30.5 G/DL (ref 32–36)
MCV RBC AUTO: 82 FL (ref 82–98)
MONOCYTES # BLD AUTO: 0.7 K/UL (ref 0.3–1)
MONOCYTES NFR BLD: 10.7 % (ref 4–15)
NEUTROPHILS # BLD AUTO: 3.5 K/UL (ref 1.8–7.7)
NEUTROPHILS NFR BLD: 57.5 % (ref 38–73)
NRBC BLD-RTO: 0 /100 WBC
PHOSPHATE SERPL-MCNC: 4.3 MG/DL (ref 2.7–4.5)
PLATELET # BLD AUTO: 169 K/UL (ref 150–450)
PMV BLD AUTO: 12.2 FL (ref 9.2–12.9)
POTASSIUM SERPL-SCNC: 4.2 MMOL/L (ref 3.5–5.1)
PROT SERPL-MCNC: 6.2 G/DL (ref 6–8.4)
PTH-INTACT SERPL-MCNC: 43 PG/ML (ref 9–77)
RBC # BLD AUTO: 4.93 M/UL (ref 4.6–6.2)
SODIUM SERPL-SCNC: 141 MMOL/L (ref 136–145)
TACROLIMUS BLD-MCNC: 4.4 NG/ML (ref 5–15)
URATE SERPL-MCNC: 8.7 MG/DL (ref 3.4–7)
WBC # BLD AUTO: 6.07 K/UL (ref 3.9–12.7)

## 2022-01-03 PROCEDURE — 83036 HEMOGLOBIN GLYCOSYLATED A1C: CPT | Performed by: INTERNAL MEDICINE

## 2022-01-03 PROCEDURE — 36415 COLL VENOUS BLD VENIPUNCTURE: CPT | Performed by: INTERNAL MEDICINE

## 2022-01-03 PROCEDURE — 85025 COMPLETE CBC W/AUTO DIFF WBC: CPT | Performed by: INTERNAL MEDICINE

## 2022-01-03 PROCEDURE — 84550 ASSAY OF BLOOD/URIC ACID: CPT | Performed by: INTERNAL MEDICINE

## 2022-01-03 PROCEDURE — 80069 RENAL FUNCTION PANEL: CPT | Performed by: INTERNAL MEDICINE

## 2022-01-03 PROCEDURE — 84075 ASSAY ALKALINE PHOSPHATASE: CPT | Performed by: INTERNAL MEDICINE

## 2022-01-03 PROCEDURE — 80197 ASSAY OF TACROLIMUS: CPT | Performed by: INTERNAL MEDICINE

## 2022-01-03 PROCEDURE — 83970 ASSAY OF PARATHORMONE: CPT | Performed by: INTERNAL MEDICINE

## 2022-01-18 ENCOUNTER — PATIENT MESSAGE (OUTPATIENT)
Dept: ADMINISTRATIVE | Facility: HOSPITAL | Age: 67
End: 2022-01-18
Payer: MEDICARE

## 2022-01-24 ENCOUNTER — OFFICE VISIT (OUTPATIENT)
Dept: NEPHROLOGY | Facility: CLINIC | Age: 67
End: 2022-01-24
Payer: MEDICARE

## 2022-01-24 VITALS
OXYGEN SATURATION: 99 % | BODY MASS INDEX: 31.44 KG/M2 | SYSTOLIC BLOOD PRESSURE: 140 MMHG | HEIGHT: 73 IN | DIASTOLIC BLOOD PRESSURE: 78 MMHG | WEIGHT: 237.19 LBS | HEART RATE: 82 BPM

## 2022-01-24 DIAGNOSIS — I13.0 HYPERTENSIVE HEART AND RENAL DISEASE WITH RENAL FAILURE, STAGE 1 THROUGH STAGE 4 OR UNSPECIFIED CHRONIC KIDNEY DISEASE, WITH HEART FAILURE: ICD-10-CM

## 2022-01-24 DIAGNOSIS — I50.30 HEART FAILURE WITH PRESERVED EJECTION FRACTION, UNSPECIFIED HF CHRONICITY: ICD-10-CM

## 2022-01-24 DIAGNOSIS — E55.9 VITAMIN D DEFICIENCY: ICD-10-CM

## 2022-01-24 DIAGNOSIS — N18.32 ANEMIA DUE TO STAGE 3B CHRONIC KIDNEY DISEASE: ICD-10-CM

## 2022-01-24 DIAGNOSIS — K57.90 DIVERTICULOSIS: ICD-10-CM

## 2022-01-24 DIAGNOSIS — R91.8 MULTIPLE LUNG NODULES ON CT: ICD-10-CM

## 2022-01-24 DIAGNOSIS — Z94.0 IMMUNOSUPPRESSIVE MANAGEMENT ENCOUNTER FOLLOWING KIDNEY TRANSPLANT: ICD-10-CM

## 2022-01-24 DIAGNOSIS — D63.1 ANEMIA DUE TO STAGE 3B CHRONIC KIDNEY DISEASE: ICD-10-CM

## 2022-01-24 DIAGNOSIS — Z94.0 H/O KIDNEY TRANSPLANT: ICD-10-CM

## 2022-01-24 DIAGNOSIS — E11.9 TYPE 2 DIABETES MELLITUS WITHOUT COMPLICATION, WITHOUT LONG-TERM CURRENT USE OF INSULIN: ICD-10-CM

## 2022-01-24 DIAGNOSIS — I48.0 PAROXYSMAL ATRIAL FIBRILLATION: ICD-10-CM

## 2022-01-24 DIAGNOSIS — E78.2 MIXED HYPERLIPIDEMIA: ICD-10-CM

## 2022-01-24 DIAGNOSIS — R73.03 PREDIABETES: ICD-10-CM

## 2022-01-24 DIAGNOSIS — Z86.79 HISTORY OF PERICARDITIS: ICD-10-CM

## 2022-01-24 DIAGNOSIS — I50.32 CHRONIC DIASTOLIC HEART FAILURE: ICD-10-CM

## 2022-01-24 DIAGNOSIS — R73.9 HYPERGLYCEMIA: ICD-10-CM

## 2022-01-24 DIAGNOSIS — I47.10 SUPRAVENTRICULAR TACHYCARDIA: ICD-10-CM

## 2022-01-24 DIAGNOSIS — M89.8X9 METABOLIC BONE DISEASE: ICD-10-CM

## 2022-01-24 DIAGNOSIS — E66.9 OBESITY (BMI 30-39.9): ICD-10-CM

## 2022-01-24 DIAGNOSIS — I25.10 CORONARY ARTERY DISEASE INVOLVING NATIVE CORONARY ARTERY OF NATIVE HEART WITHOUT ANGINA PECTORIS: ICD-10-CM

## 2022-01-24 DIAGNOSIS — N25.81 SECONDARY RENAL HYPERPARATHYROIDISM: ICD-10-CM

## 2022-01-24 DIAGNOSIS — N18.32 STAGE 3B CHRONIC KIDNEY DISEASE: Primary | ICD-10-CM

## 2022-01-24 DIAGNOSIS — D50.8 OTHER IRON DEFICIENCY ANEMIA: ICD-10-CM

## 2022-01-24 DIAGNOSIS — D84.821 IMMUNODEFICIENCY DUE TO TREATMENT WITH IMMUNOSUPPRESSIVE MEDICATION: ICD-10-CM

## 2022-01-24 DIAGNOSIS — Z79.899 IMMUNODEFICIENCY DUE TO TREATMENT WITH IMMUNOSUPPRESSIVE MEDICATION: ICD-10-CM

## 2022-01-24 DIAGNOSIS — Z94.0 KIDNEY REPLACED BY TRANSPLANT: ICD-10-CM

## 2022-01-24 DIAGNOSIS — Z79.899 IMMUNOSUPPRESSIVE MANAGEMENT ENCOUNTER FOLLOWING KIDNEY TRANSPLANT: ICD-10-CM

## 2022-01-24 PROCEDURE — 99499 RISK ADDL DX/OHS AUDIT: ICD-10-PCS | Mod: S$GLB,,, | Performed by: INTERNAL MEDICINE

## 2022-01-24 PROCEDURE — 3288F FALL RISK ASSESSMENT DOCD: CPT | Mod: CPTII,S$GLB,, | Performed by: INTERNAL MEDICINE

## 2022-01-24 PROCEDURE — 3078F DIAST BP <80 MM HG: CPT | Mod: CPTII,S$GLB,, | Performed by: INTERNAL MEDICINE

## 2022-01-24 PROCEDURE — 3044F HG A1C LEVEL LT 7.0%: CPT | Mod: CPTII,S$GLB,, | Performed by: INTERNAL MEDICINE

## 2022-01-24 PROCEDURE — 1101F PT FALLS ASSESS-DOCD LE1/YR: CPT | Mod: CPTII,S$GLB,, | Performed by: INTERNAL MEDICINE

## 2022-01-24 PROCEDURE — 1101F PR PT FALLS ASSESS DOC 0-1 FALLS W/OUT INJ PAST YR: ICD-10-PCS | Mod: CPTII,S$GLB,, | Performed by: INTERNAL MEDICINE

## 2022-01-24 PROCEDURE — 1126F AMNT PAIN NOTED NONE PRSNT: CPT | Mod: CPTII,S$GLB,, | Performed by: INTERNAL MEDICINE

## 2022-01-24 PROCEDURE — 99999 PR PBB SHADOW E&M-EST. PATIENT-LVL III: ICD-10-PCS | Mod: PBBFAC,,, | Performed by: INTERNAL MEDICINE

## 2022-01-24 PROCEDURE — 99999 PR PBB SHADOW E&M-EST. PATIENT-LVL III: CPT | Mod: PBBFAC,,, | Performed by: INTERNAL MEDICINE

## 2022-01-24 PROCEDURE — 3078F PR MOST RECENT DIASTOLIC BLOOD PRESSURE < 80 MM HG: ICD-10-PCS | Mod: CPTII,S$GLB,, | Performed by: INTERNAL MEDICINE

## 2022-01-24 PROCEDURE — 3077F SYST BP >= 140 MM HG: CPT | Mod: CPTII,S$GLB,, | Performed by: INTERNAL MEDICINE

## 2022-01-24 PROCEDURE — 3066F NEPHROPATHY DOC TX: CPT | Mod: CPTII,S$GLB,, | Performed by: INTERNAL MEDICINE

## 2022-01-24 PROCEDURE — 3008F PR BODY MASS INDEX (BMI) DOCUMENTED: ICD-10-PCS | Mod: CPTII,S$GLB,, | Performed by: INTERNAL MEDICINE

## 2022-01-24 PROCEDURE — 99499 UNLISTED E&M SERVICE: CPT | Mod: S$GLB,,, | Performed by: INTERNAL MEDICINE

## 2022-01-24 PROCEDURE — 99215 OFFICE O/P EST HI 40 MIN: CPT | Mod: S$GLB,,, | Performed by: INTERNAL MEDICINE

## 2022-01-24 PROCEDURE — 3288F PR FALLS RISK ASSESSMENT DOCUMENTED: ICD-10-PCS | Mod: CPTII,S$GLB,, | Performed by: INTERNAL MEDICINE

## 2022-01-24 PROCEDURE — 3008F BODY MASS INDEX DOCD: CPT | Mod: CPTII,S$GLB,, | Performed by: INTERNAL MEDICINE

## 2022-01-24 PROCEDURE — 3044F PR MOST RECENT HEMOGLOBIN A1C LEVEL <7.0%: ICD-10-PCS | Mod: CPTII,S$GLB,, | Performed by: INTERNAL MEDICINE

## 2022-01-24 PROCEDURE — 3066F PR DOCUMENTATION OF TREATMENT FOR NEPHROPATHY: ICD-10-PCS | Mod: CPTII,S$GLB,, | Performed by: INTERNAL MEDICINE

## 2022-01-24 PROCEDURE — 99215 PR OFFICE/OUTPT VISIT, EST, LEVL V, 40-54 MIN: ICD-10-PCS | Mod: S$GLB,,, | Performed by: INTERNAL MEDICINE

## 2022-01-24 PROCEDURE — 1126F PR PAIN SEVERITY QUANTIFIED, NO PAIN PRESENT: ICD-10-PCS | Mod: CPTII,S$GLB,, | Performed by: INTERNAL MEDICINE

## 2022-01-24 PROCEDURE — 3077F PR MOST RECENT SYSTOLIC BLOOD PRESSURE >= 140 MM HG: ICD-10-PCS | Mod: CPTII,S$GLB,, | Performed by: INTERNAL MEDICINE

## 2022-01-24 RX ORDER — INFLUENZA VACCINE, ADJUVANTED 15; 15; 15; 15 UG/.5ML; UG/.5ML; UG/.5ML; UG/.5ML
INJECTION, SUSPENSION INTRAMUSCULAR
COMMUNITY
Start: 2021-11-23 | End: 2022-04-21

## 2022-01-24 NOTE — PROGRESS NOTES
Subjective:       Patient ID: Ibrahima Phelps is a 66 y.o. Black or  male who presents for follow-up evaluation of Chronic Renal Failure and Kidney Transplant    HPI   He is getting over an URI, has not 'gone to his chest' but now feeling better. He continues to smoke a few cigarettes a day. Off PPI for several months now and without dyspepsia. No LE edema and no SOB.  He's noticed some allograft 'twinges' but no overt pain but no problems with urination. His wife is still working at [employer] as a manager of housing for [employer].     Interval history March 2019: he has three concerns:  1. Worsening tremors. Spilling corn and trouble with writing  2. Increased nocturia  3. Cold intolerance    He denies recent illness. No allogarft pain and no LUTS other than nocturia. He has unintentionally lost weight. No new medications. He is doing well off PPI. He is smoking 3 cigarettes to 3/4ppd depending on the day.     Interval history July 2019: he was hospitalized for CP and was found to be in SVT, his potassium was low in ER. He was CP free in ER after X 1 SL NTG and after SVT broke. Since then he feels well. He has lost a little bit of weight. Still smoking     Interval history Nov 2019: feels poorly--has URI and lost voice, no sick contacts. Kidney txp is managing Prograf--Cr is higher than baseline but Prograf is running high. No LUTS and no allograft pain.     Interval history April 2020:  The patient location is: Home  The chief complaint leading to consultation is: CKD and kidney transplant recioient  Visit type: audiovisual  Total time spent with patient:  28 minutes  Each patient to whom he or she provides medical services by telemedicine is:  (1) informed of the relationship between the physician and patient and the respective role of any other health care provider with respect to management of the patient; and (2) notified that he or she may decline to receive medical services by telemedicine and may  withdraw from such care at any time.  Notes: He is doing well. He stays home, his wife does the shopping and errands. He was admitted to the hospital in late March for chest pain, felt to be musculoskeletal. They started hydralazine for BP, lowered too much at home with symptoms and Cards advised to cut in half. No LE edema. No allograft pain and no LUTS. He still takes his calcium as instructed.   Interval lhistory Aug 2020:  He feels well. No hospital stays since last visit. He notes a skin lesion to right wrist, not pruritic, he noted similar spots when he was on coumadin but not as many when he was on Eliquis, it is actually improving. It  doesn't justice. He had one day of allograft tenderness but no LUTS associated with it and no recurrence. No new medications.     Interval history Jan 2021:   The patient location is: Home  The chief complaint leading to consultation is: CKD and kidney transplant   Visit type: audiovisual  Face to Face time with patient: 32 minutes  51 minutes of total time spent on the encounter, which includes face to face time and non-face to face time preparing to see the patient (eg, review of tests), Obtaining and/or reviewing separately obtained history, Documenting clinical information in the electronic or other health record, Independently interpreting results (not separately reported) and communicating results to the patient/family/caregiver, or Care coordination (not separately reported).   Each patient to whom he or she provides medical services by telemedicine is:  (1) informed of the relationship between the physician and patient and the respective role of any other health care provider with respect to management of the patient; and (2) notified that he or she may decline to receive medical services by telemedicine and may withdraw from such care at any time.  Notes: He notes BP is high. He feels well. He remains feeling cold most of the time. No new medications.     Interval  history July 2021: He is doing well. Brings a BP log with the average above goal. No CP     Interval History Jan 2022: He is feeling well. No allograft pain. No recent ER visits. Tremors about the same--saw Neuro       Review of Systems   Constitutional: Negative for activity change, appetite change, fatigue, fever and unexpected weight change.   HENT: Negative for facial swelling.    Respiratory: Negative for cough and shortness of breath.    Cardiovascular: Negative for chest pain and leg swelling.   Gastrointestinal: Negative for abdominal pain.   Endocrine: Negative for polyuria.   Genitourinary: Positive for frequency. Negative for decreased urine volume, difficulty urinating, dysuria and hematuria.   Musculoskeletal: Positive for arthralgias and back pain. Negative for joint swelling.   Allergic/Immunologic: Positive for environmental allergies and immunocompromised state.   Neurological: Positive for tremors. Negative for dizziness and seizures.   Hematological: Does not bruise/bleed easily.   Psychiatric/Behavioral: Negative for confusion, decreased concentration and sleep disturbance. The patient is not nervous/anxious.        Objective:      Physical Exam  Vitals and nursing note reviewed.   Constitutional:       General: He is not in acute distress.     Appearance: Normal appearance. He is normal weight. He is not ill-appearing.   HENT:      Head: Atraumatic.      Nose: No congestion.   Eyes:      General: No scleral icterus.  Cardiovascular:      Rate and Rhythm: Normal rate.      Heart sounds: No friction rub.   Pulmonary:      Effort: No respiratory distress.   Abdominal:      General: There is distension.   Musculoskeletal:      Right lower leg: No edema.      Left lower leg: No edema.   Skin:     Coloration: Skin is not jaundiced.   Neurological:      General: No focal deficit present.      Mental Status: He is alert and oriented to person, place, and time. Mental status is at baseline.    Psychiatric:         Mood and Affect: Mood normal.         Behavior: Behavior normal.         Thought Content: Thought content normal.         Judgment: Judgment normal.         Assessment:       1. Stage 3b chronic kidney disease    2. H/O kidney transplant    3. Kidney replaced by transplant    4. Vitamin D deficiency    5. Secondary renal hyperparathyroidism    6. Immunosuppressive management encounter following kidney transplant    7. Metabolic bone disease    8. Prediabetes    9. Heart failure with preserved ejection fraction, unspecified HF chronicity    10. Diverticulosis    11. Mixed hyperlipidemia    12. Hypertensive heart and renal disease with renal failure, stage 1 through stage 4 or unspecified chronic kidney disease, with heart failure    13. Supraventricular tachycardia    14. Coronary artery disease involving native coronary artery of native heart without angina pectoris    15. Paroxysmal atrial fibrillation    16. Chronic diastolic heart failure    17. Type 2 diabetes mellitus without complication, without long-term current use of insulin    18. Multiple lung nodules on CT    19. Anemia due to stage 3b chronic kidney disease    20. Other iron deficiency anemia    21. Obesity (BMI 30-39.9)    22. Hyperglycemia    23. History of pericarditis    24. Immunodeficiency due to treatment with immunosuppressive medication        Plan:             ESRD s/p kidney transplsant with resultant CKD. Stable allograft function with new baseline creatinine 2.5. Tac level at goal (4-6) Repeat level    CKD with subnephrotic proteinuria--stable    HTN--controlled    DM2 post transplant, steroid induced/PreDiabetes--stable/improved    Mineral and Bone Disease--continue current treatment with calcium supplements, Zemplar and D3      RTC 4 months with labs prior    51 minutes of total time spent on the encounter, which includes face to face time and non-face to face time preparing to see the patient (eg, review of  tests), Obtaining and/or reviewing separately obtained history, Documenting clinical information in the electronic or other health record, Independently interpreting results (not separately reported) and communicating results to the patient/family/caregiver, or Care coordination (not separately reported).

## 2022-01-26 DIAGNOSIS — E11.9 TYPE 2 DIABETES MELLITUS WITHOUT COMPLICATION, UNSPECIFIED WHETHER LONG TERM INSULIN USE: ICD-10-CM

## 2022-01-31 ENCOUNTER — OFFICE VISIT (OUTPATIENT)
Dept: INTERNAL MEDICINE | Facility: CLINIC | Age: 67
End: 2022-01-31
Payer: MEDICARE

## 2022-01-31 VITALS
SYSTOLIC BLOOD PRESSURE: 124 MMHG | BODY MASS INDEX: 31.3 KG/M2 | HEART RATE: 96 BPM | HEIGHT: 73 IN | DIASTOLIC BLOOD PRESSURE: 70 MMHG | OXYGEN SATURATION: 98 % | WEIGHT: 236.13 LBS

## 2022-01-31 DIAGNOSIS — I50.32 CHRONIC HEART FAILURE WITH PRESERVED EJECTION FRACTION: ICD-10-CM

## 2022-01-31 DIAGNOSIS — I48.0 PAROXYSMAL ATRIAL FIBRILLATION: ICD-10-CM

## 2022-01-31 DIAGNOSIS — E78.2 MIXED HYPERLIPIDEMIA: ICD-10-CM

## 2022-01-31 DIAGNOSIS — Z12.5 SCREENING PSA (PROSTATE SPECIFIC ANTIGEN): ICD-10-CM

## 2022-01-31 DIAGNOSIS — J43.8 OTHER EMPHYSEMA: ICD-10-CM

## 2022-01-31 DIAGNOSIS — R91.8 MULTIPLE LUNG NODULES ON CT: Primary | ICD-10-CM

## 2022-01-31 DIAGNOSIS — L98.9 FOOT LESION: ICD-10-CM

## 2022-01-31 DIAGNOSIS — N18.4 CHRONIC KIDNEY DISEASE, STAGE 4 (SEVERE): ICD-10-CM

## 2022-01-31 DIAGNOSIS — R06.09 DOE (DYSPNEA ON EXERTION): ICD-10-CM

## 2022-01-31 DIAGNOSIS — Z72.0 TOBACCO USE: ICD-10-CM

## 2022-01-31 DIAGNOSIS — I13.0 HYPERTENSIVE HEART AND RENAL DISEASE WITH RENAL FAILURE, STAGE 1 THROUGH STAGE 4 OR UNSPECIFIED CHRONIC KIDNEY DISEASE, WITH HEART FAILURE: ICD-10-CM

## 2022-01-31 DIAGNOSIS — I77.9 CAROTID ARTERY DISEASE, UNSPECIFIED LATERALITY, UNSPECIFIED TYPE: ICD-10-CM

## 2022-01-31 DIAGNOSIS — Z87.891 PERSONAL HISTORY OF NICOTINE DEPENDENCE: ICD-10-CM

## 2022-01-31 PROCEDURE — 3074F PR MOST RECENT SYSTOLIC BLOOD PRESSURE < 130 MM HG: ICD-10-PCS | Mod: CPTII,S$GLB,, | Performed by: INTERNAL MEDICINE

## 2022-01-31 PROCEDURE — 1159F PR MEDICATION LIST DOCUMENTED IN MEDICAL RECORD: ICD-10-PCS | Mod: CPTII,S$GLB,, | Performed by: INTERNAL MEDICINE

## 2022-01-31 PROCEDURE — 99214 OFFICE O/P EST MOD 30 MIN: CPT | Mod: S$GLB,,, | Performed by: INTERNAL MEDICINE

## 2022-01-31 PROCEDURE — 3078F PR MOST RECENT DIASTOLIC BLOOD PRESSURE < 80 MM HG: ICD-10-PCS | Mod: CPTII,S$GLB,, | Performed by: INTERNAL MEDICINE

## 2022-01-31 PROCEDURE — 3008F PR BODY MASS INDEX (BMI) DOCUMENTED: ICD-10-PCS | Mod: CPTII,S$GLB,, | Performed by: INTERNAL MEDICINE

## 2022-01-31 PROCEDURE — 1126F AMNT PAIN NOTED NONE PRSNT: CPT | Mod: CPTII,S$GLB,, | Performed by: INTERNAL MEDICINE

## 2022-01-31 PROCEDURE — 1101F PT FALLS ASSESS-DOCD LE1/YR: CPT | Mod: CPTII,S$GLB,, | Performed by: INTERNAL MEDICINE

## 2022-01-31 PROCEDURE — 3044F PR MOST RECENT HEMOGLOBIN A1C LEVEL <7.0%: ICD-10-PCS | Mod: CPTII,S$GLB,, | Performed by: INTERNAL MEDICINE

## 2022-01-31 PROCEDURE — 3066F PR DOCUMENTATION OF TREATMENT FOR NEPHROPATHY: ICD-10-PCS | Mod: CPTII,S$GLB,, | Performed by: INTERNAL MEDICINE

## 2022-01-31 PROCEDURE — 1159F MED LIST DOCD IN RCRD: CPT | Mod: CPTII,S$GLB,, | Performed by: INTERNAL MEDICINE

## 2022-01-31 PROCEDURE — 3288F FALL RISK ASSESSMENT DOCD: CPT | Mod: CPTII,S$GLB,, | Performed by: INTERNAL MEDICINE

## 2022-01-31 PROCEDURE — 1126F PR PAIN SEVERITY QUANTIFIED, NO PAIN PRESENT: ICD-10-PCS | Mod: CPTII,S$GLB,, | Performed by: INTERNAL MEDICINE

## 2022-01-31 PROCEDURE — 3044F HG A1C LEVEL LT 7.0%: CPT | Mod: CPTII,S$GLB,, | Performed by: INTERNAL MEDICINE

## 2022-01-31 PROCEDURE — 1101F PR PT FALLS ASSESS DOC 0-1 FALLS W/OUT INJ PAST YR: ICD-10-PCS | Mod: CPTII,S$GLB,, | Performed by: INTERNAL MEDICINE

## 2022-01-31 PROCEDURE — 3078F DIAST BP <80 MM HG: CPT | Mod: CPTII,S$GLB,, | Performed by: INTERNAL MEDICINE

## 2022-01-31 PROCEDURE — 3066F NEPHROPATHY DOC TX: CPT | Mod: CPTII,S$GLB,, | Performed by: INTERNAL MEDICINE

## 2022-01-31 PROCEDURE — 99499 UNLISTED E&M SERVICE: CPT | Mod: S$GLB,,, | Performed by: INTERNAL MEDICINE

## 2022-01-31 PROCEDURE — 3288F PR FALLS RISK ASSESSMENT DOCUMENTED: ICD-10-PCS | Mod: CPTII,S$GLB,, | Performed by: INTERNAL MEDICINE

## 2022-01-31 PROCEDURE — 99499 RISK ADDL DX/OHS AUDIT: ICD-10-PCS | Mod: S$GLB,,, | Performed by: INTERNAL MEDICINE

## 2022-01-31 PROCEDURE — 99999 PR PBB SHADOW E&M-EST. PATIENT-LVL V: ICD-10-PCS | Mod: PBBFAC,,, | Performed by: INTERNAL MEDICINE

## 2022-01-31 PROCEDURE — 1160F RVW MEDS BY RX/DR IN RCRD: CPT | Mod: CPTII,S$GLB,, | Performed by: INTERNAL MEDICINE

## 2022-01-31 PROCEDURE — 99214 PR OFFICE/OUTPT VISIT, EST, LEVL IV, 30-39 MIN: ICD-10-PCS | Mod: S$GLB,,, | Performed by: INTERNAL MEDICINE

## 2022-01-31 PROCEDURE — 3008F BODY MASS INDEX DOCD: CPT | Mod: CPTII,S$GLB,, | Performed by: INTERNAL MEDICINE

## 2022-01-31 PROCEDURE — 1160F PR REVIEW ALL MEDS BY PRESCRIBER/CLIN PHARMACIST DOCUMENTED: ICD-10-PCS | Mod: CPTII,S$GLB,, | Performed by: INTERNAL MEDICINE

## 2022-01-31 PROCEDURE — 3074F SYST BP LT 130 MM HG: CPT | Mod: CPTII,S$GLB,, | Performed by: INTERNAL MEDICINE

## 2022-01-31 PROCEDURE — 99999 PR PBB SHADOW E&M-EST. PATIENT-LVL V: CPT | Mod: PBBFAC,,, | Performed by: INTERNAL MEDICINE

## 2022-01-31 RX ORDER — DOXAZOSIN 4 MG/1
4 TABLET ORAL EVERY 12 HOURS
Qty: 180 TABLET | Refills: 3 | Status: SHIPPED | OUTPATIENT
Start: 2022-01-31 | End: 2022-04-08

## 2022-01-31 RX ORDER — FUROSEMIDE 40 MG/1
20 TABLET ORAL 2 TIMES DAILY
Qty: 180 TABLET | Refills: 3 | Status: SHIPPED | OUTPATIENT
Start: 2022-01-31 | End: 2022-07-30 | Stop reason: SDUPTHER

## 2022-01-31 RX ORDER — ATORVASTATIN CALCIUM 10 MG/1
10 TABLET, FILM COATED ORAL DAILY
Qty: 90 TABLET | Refills: 3 | Status: SHIPPED | OUTPATIENT
Start: 2022-01-31 | End: 2022-03-08

## 2022-01-31 RX ORDER — SPIRONOLACTONE 25 MG/1
25 TABLET ORAL DAILY
Qty: 90 TABLET | Refills: 3 | Status: SHIPPED | OUTPATIENT
Start: 2022-01-31 | End: 2022-10-11

## 2022-01-31 NOTE — PROGRESS NOTES
INTERNAL MEDICINE ESTABLISHED PATIENT VISIT NOTE    Subjective:     Chief Complaint: Follow-up  HTN, CAD     Patient ID: Ibrahima Phelps is a 66 y.o. male with HTN c CKD3 and hx renal transplant x2 in 2002 and 2005 and on immunosuppressive meds and secondary hyperPTH, HLD, A fib, hx SVT, CAD c chronic HF c preserved EF, COPD c baseline DAUGHERTY and tob use, thrombocytopenia now resolved, anemia of chronic disease, preDM, GERD, lung nodule on CT 2/2020 c plan to repeat screening CT in one year, last seen by me 3/2021, here today for f/u.    Has had some baseline SOB but states sx are chronic.  Denies cp.  Some productive cough but sx intermittent.  Denies wheezing.    Past Medical History:  Past Medical History:   Diagnosis Date    (HFpEF) heart failure with preserved ejection fraction 9/25/2017    Atrial fibrillation     CAD (coronary artery disease)     CHF (congestive heart failure)     Chronic diastolic heart failure 4/8/2020    CKD (chronic kidney disease) stage 3, GFR 30-59 ml/min     Dr. Latif    Diverticulosis     Fever blister     Heart attack 2006    Hyperlipidemia     Hypertension     Immunodeficiency due to treatment with immunosuppressive medication     Keloid cicatrix     Metabolic bone disease     Pericarditis     S/P kidney transplant     Supraventricular tachycardia 06/2019    Thrombocytopenia     Thyroid disease     Unspecified disorder of kidney and ureter     Stage IIICKD       Home Medications:  Prior to Admission medications    Medication Sig Start Date End Date Taking? Authorizing Provider   albuterol (ACCUNEB) 1.25 mg/3 mL Nebu Take 3 mLs (1.25 mg total) by nebulization every 6 (six) hours as needed. Rescue 10/1/19 9/16/21 Yes Connie Magdaleno MD   apixaban (ELIQUIS) 5 mg Tab Take 1 tablet (5 mg total) by mouth 2 (two) times daily. 7/1/21  Yes Shagufta Blunt MD   aspirin (ECOTRIN) 81 MG EC tablet Take 1 tablet by mouth Daily. 6/11/12  Yes Historical Provider   atorvastatin  (LIPITOR) 10 MG tablet Take 1 tablet (10 mg total) by mouth once daily. 3/17/21  Yes Emre Latif MD   calcium carbonate (OS-TRISH) 500 mg calcium (1,250 mg) tablet TAKE 2 TABLETS BY MOUTH TWICE A DAY 1/18/22  Yes Emre Latif MD   doxazosin (CARDURA) 4 MG tablet Take 1 tablet (4 mg total) by mouth every 12 (twelve) hours. 3/17/21  Yes Emre Latif MD   famotidine (PEPCID) 20 MG tablet Take 1 tablet (20 mg total) by mouth 2 (two) times daily. 10/28/21  Yes Emre Latif MD   fexofenadine (ALLEGRA) 60 MG tablet Take 1 tablet by mouth Twice daily as needed. 6/11/12  Yes Historical Provider   fluticasone propionate (FLONASE) 50 mcg/actuation nasal spray 1 spray (50 mcg total) by Each Nostril route once daily. 7/26/19  Yes Trish Worthy PA-C   furosemide (LASIX) 40 MG tablet Take 0.5 tablets (20 mg total) by mouth 2 (two) times daily. 1/27/21  Yes Emre Latif MD   gabapentin (NEURONTIN) 300 MG capsule TAKE 1 CAPSULE BY MOUTH TWICE A DAY 10/25/21  Yes Connie Magdaleno MD   hydrALAZINE (APRESOLINE) 50 MG tablet Take 1 tablet (50 mg total) by mouth 3 (three) times daily. 7/20/21  Yes Emre Latif MD   KLOR-CON 10 10 mEq TbSR TAKE 1 TABLET BY MOUTH EVERY DAY 6/25/21  Yes Alberta Loredo NP   multivitamin (THERAGRAN) per tablet Take 1 tablet by mouth Daily. 6/11/12  Yes Historical Provider   nicotine (NICODERM CQ) 21 mg/24 hr Place 1 patch onto the skin once daily. 7/8/19  Yes Emre Latif MD   NIFEdipine (PROCARDIA-XL) 60 MG (OSM) 24 hr tablet Take 2 tablets (120 mg total) by mouth once daily.  Patient taking differently: Take 120 mg by mouth 2 (two) times a day. 8/15/21 8/15/22 Yes Indra Lopez MD   paricalcitoL (ZEMPLAR) 1 MCG capsule TAKE 1 TABLET BY MOUTH ON MONDAY, WEDNESDAY, & FRIDAY 7/3/21  Yes Emre Latif MD   predniSONE (DELTASONE) 5 MG tablet Take 1 tablet (5 mg total) by mouth every morning. 3/17/21  Yes Emre Latif MD   spironolactone  (ALDACTONE) 25 MG tablet Take 1 tablet (25 mg total) by mouth once daily. 3/17/21  Yes Emre Latif MD   tacrolimus (PROGRAF) 1 MG Cap TAKE 2 CAPSULES BY MOUTH EVERY MORNING AND 1 CAPSULE EVERY EVENING.  Z94.0 kidney transplant 7/20/21  Yes Emre Latif MD   vitamin D 1000 units Tab Take 370 mg by mouth 2 (two) times daily. 2 tablets BID   Yes Historical Provider   FLUAD QUAD 2021-22,65Y UP,,PF, 60 mcg (15 mcg x 4)/0.5 mL Syrg  11/23/21   Historical Provider   mycophenolate (CELLCEPT) 250 mg Cap Take 2 capsules (500 mg total) by mouth 2 (two) times daily. 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   Substance Use Topics    Alcohol use: No    Drug use: No        Review of Systems   Constitutional: Negative for appetite change, chills, fatigue, fever and unexpected weight change.   HENT: Negative for congestion, hearing loss and rhinorrhea.    Eyes: Negative for pain and visual disturbance.   Respiratory: Positive for shortness of breath. Negative for cough, chest tightness and wheezing.    Cardiovascular: Negative for chest pain, palpitations and leg swelling.   Gastrointestinal: Negative for abdominal distention and abdominal pain.   Endocrine: Negative for polydipsia and polyuria.   Genitourinary: Negative for decreased urine volume, dysuria, frequency and penile discharge.   Neurological: Negative for dizziness, weakness, numbness and headaches.   Psychiatric/Behavioral: Negative for  "behavioral problems and confusion.         Health Maintenance:     Immunizations:   Influenza 11/2021  TDap is up to date 3/2018  Pneumovax 12/2016, Prevnar 13 2/2017, pvax booster 2/2020  Shingrix 10/8/2020, 12/2020  COVID vaccine 2/11/21 Pfizer, 3/5/21, 8/2021     Cancer Screening:  Colonoscopy: is up to date. 9/2014, polyps, hyperplastic and tubular adenoma, rec f/u in 5 yrs, most recent csc done 3/2021 c area of patchy erythema, neg for colitis or dysplasia, f/u 2 yrs per report.  PSA 12/2020 wnl, will schedule repeat.  AAA u/s 12/2020 no AAA    Objective:   BP (!) 140/68 (BP Location: Left arm, Patient Position: Sitting, BP Method: Large (Manual))   Pulse 96   Ht 6' 1" (1.854 m)   Wt 107.1 kg (236 lb 1.8 oz)   SpO2 98%   BMI 31.15 kg/m²        General: AAO x3, no apparent distress  HEENT: PERRL, OP clear  CV:  irregularly irregular.  Pulm: Lungs CTAB, no crackles, no wheezes  Abd: s/NT/ND +BS  Extremities: no c/c/e  Foot: small lesion on lateral aspect of foot, rounded, no surrounded erythema.    Labs:     Lab Results   Component Value Date    WBC 6.07 01/03/2022    HGB 12.3 (L) 01/03/2022    HCT 40.3 01/03/2022    MCV 82 01/03/2022     01/03/2022     Sodium   Date Value Ref Range Status   01/03/2022 141 136 - 145 mmol/L Final     Potassium   Date Value Ref Range Status   01/03/2022 4.2 3.5 - 5.1 mmol/L Final     Chloride   Date Value Ref Range Status   01/03/2022 106 95 - 110 mmol/L Final     CO2   Date Value Ref Range Status   01/03/2022 25 23 - 29 mmol/L Final     Glucose   Date Value Ref Range Status   01/03/2022 94 70 - 110 mg/dL Final     BUN   Date Value Ref Range Status   01/03/2022 25 (H) 8 - 23 mg/dL Final     Creatinine   Date Value Ref Range Status   01/03/2022 2.3 (H) 0.5 - 1.4 mg/dL Final     Calcium   Date Value Ref Range Status   01/03/2022 9.0 8.7 - 10.5 mg/dL Final     Total Protein   Date Value Ref Range Status   01/03/2022 6.2 6.0 - 8.4 g/dL Final     Albumin   Date Value Ref " Range Status   01/03/2022 3.4 (L) 3.5 - 5.2 g/dL Final   01/03/2022 3.4 (L) 3.5 - 5.2 g/dL Final     Total Bilirubin   Date Value Ref Range Status   01/03/2022 0.5 0.1 - 1.0 mg/dL Final     Comment:     For infants and newborns, interpretation of results should be based  on gestational age, weight and in agreement with clinical  observations.    Premature Infant recommended reference ranges:  Up to 24 hours.............<8.0 mg/dL  Up to 48 hours............<12.0 mg/dL  3-5 days..................<15.0 mg/dL  6-29 days.................<15.0 mg/dL       Alkaline Phosphatase   Date Value Ref Range Status   01/03/2022 64 55 - 135 U/L Final     AST   Date Value Ref Range Status   01/03/2022 25 10 - 40 U/L Final     ALT   Date Value Ref Range Status   01/03/2022 24 10 - 44 U/L Final     Anion Gap   Date Value Ref Range Status   01/03/2022 10 8 - 16 mmol/L Final     eGFR if    Date Value Ref Range Status   01/03/2022 32.7 (A) >60 mL/min/1.73 m^2 Final     eGFR if non    Date Value Ref Range Status   01/03/2022 28.3 (A) >60 mL/min/1.73 m^2 Final     Comment:     Calculation used to obtain the estimated glomerular filtration  rate (eGFR) is the CKD-EPI equation.        Lab Results   Component Value Date    HGBA1C 5.3 01/03/2022     Lab Results   Component Value Date    LDLCALC 54.2 (L) 12/09/2020     Lab Results   Component Value Date    TSH 1.260 09/16/2021     PSA, Screen (ng/mL)   Date Value   12/09/2020 0.51         Assessment/Plan     Ibrahima was seen today for follow-up.    Diagnoses and all orders for this visit:    Multiple lung nodules on CT  Stable on last scan in 2020, due for f/u CT, will order.    Mixed hyperlipidemia  Lab Results   Component Value Date    LDLCALC 54.2 (L) 12/09/2020     Needs updated labs  Well controlled on last check  Cont lipitor.  -     atorvastatin (LIPITOR) 10 MG tablet; Take 1 tablet (10 mg total) by mouth once daily.  -     Lipid Panel; Future    (HFpEF)  heart failure with preserved ejection fraction  -     furosemide (LASIX) 40 MG tablet; Take 0.5 tablets (20 mg total) by mouth 2 (two) times daily.  -     Ambulatory referral/consult to Cardiology; Future    Hypertensive heart and renal disease with renal failure, stage 1 through stage 4 or unspecified chronic kidney disease, with heart failure  Euvolemic  Due for annual f/u c Cards, will refer.  bp at goal on repeat manual  -     doxazosin (CARDURA) 4 MG tablet; Take 1 tablet (4 mg total) by mouth every 12 (twelve) hours.    Tobacco use  Patient counseled on the need to stop smoking.  Declined referral for smoking cessation at this time.  Risks discussed.  -     CT Chest Lung Screening Low Dose; Future    DAUGHERTY (dyspnea on exertion)  Will repeat PFTs since none recent, CT chest  Could also be 2/2 A fib, see mgmt below.  -     Complete PFT w/ bronchodilator; Future    Screening PSA (prostate specific antigen)  -     PSA, Screening; Future    Foot lesion  Suspect wart  -     Ambulatory referral/consult to Podiatry; Future    Paroxysmal atrial fibrillation  rec eval by EP  Rate controlled  Cont Eliquis for now.       Ambulatory referral/consult to Electrophysiology; Future    Personal history of nicotine dependence   -     CT Chest Lung Screening Low Dose; Future    Other orders  -     spironolactone (ALDACTONE) 25 MG tablet; Take 1 tablet (25 mg total) by mouth once daily.      HM as above  RTC in 6 mos, sooner if needed.  Labs in May c Transplant labs, recent labs reviewed and acceptable.      Connie Magdaleno MD  Department of Internal Medicine - [facility]  01/31/2022

## 2022-02-01 ENCOUNTER — OFFICE VISIT (OUTPATIENT)
Dept: PODIATRY | Facility: CLINIC | Age: 67
End: 2022-02-01
Payer: MEDICARE

## 2022-02-01 VITALS
BODY MASS INDEX: 31.38 KG/M2 | WEIGHT: 236.75 LBS | HEART RATE: 110 BPM | HEIGHT: 73 IN | DIASTOLIC BLOOD PRESSURE: 81 MMHG | SYSTOLIC BLOOD PRESSURE: 144 MMHG | RESPIRATION RATE: 18 BRPM

## 2022-02-01 DIAGNOSIS — L98.8 ACQUIRED POROKERATOSIS: Primary | ICD-10-CM

## 2022-02-01 DIAGNOSIS — L98.9 FOOT LESION: ICD-10-CM

## 2022-02-01 DIAGNOSIS — M79.671 FOOT PAIN, BILATERAL: ICD-10-CM

## 2022-02-01 DIAGNOSIS — M79.672 FOOT PAIN, BILATERAL: ICD-10-CM

## 2022-02-01 DIAGNOSIS — L84 CORN OR CALLUS: ICD-10-CM

## 2022-02-01 PROCEDURE — 99999 PR PBB SHADOW E&M-EST. PATIENT-LVL V: ICD-10-PCS | Mod: PBBFAC,,, | Performed by: PODIATRIST

## 2022-02-01 PROCEDURE — 3044F HG A1C LEVEL LT 7.0%: CPT | Mod: CPTII,S$GLB,, | Performed by: PODIATRIST

## 2022-02-01 PROCEDURE — 3077F PR MOST RECENT SYSTOLIC BLOOD PRESSURE >= 140 MM HG: ICD-10-PCS | Mod: CPTII,S$GLB,, | Performed by: PODIATRIST

## 2022-02-01 PROCEDURE — 99203 PR OFFICE/OUTPT VISIT, NEW, LEVL III, 30-44 MIN: ICD-10-PCS | Mod: S$GLB,,, | Performed by: PODIATRIST

## 2022-02-01 PROCEDURE — 3008F PR BODY MASS INDEX (BMI) DOCUMENTED: ICD-10-PCS | Mod: CPTII,S$GLB,, | Performed by: PODIATRIST

## 2022-02-01 PROCEDURE — 1126F PR PAIN SEVERITY QUANTIFIED, NO PAIN PRESENT: ICD-10-PCS | Mod: CPTII,S$GLB,, | Performed by: PODIATRIST

## 2022-02-01 PROCEDURE — 3008F BODY MASS INDEX DOCD: CPT | Mod: CPTII,S$GLB,, | Performed by: PODIATRIST

## 2022-02-01 PROCEDURE — 3079F PR MOST RECENT DIASTOLIC BLOOD PRESSURE 80-89 MM HG: ICD-10-PCS | Mod: CPTII,S$GLB,, | Performed by: PODIATRIST

## 2022-02-01 PROCEDURE — 99203 OFFICE O/P NEW LOW 30 MIN: CPT | Mod: S$GLB,,, | Performed by: PODIATRIST

## 2022-02-01 PROCEDURE — 3079F DIAST BP 80-89 MM HG: CPT | Mod: CPTII,S$GLB,, | Performed by: PODIATRIST

## 2022-02-01 PROCEDURE — 1159F MED LIST DOCD IN RCRD: CPT | Mod: CPTII,S$GLB,, | Performed by: PODIATRIST

## 2022-02-01 PROCEDURE — 3077F SYST BP >= 140 MM HG: CPT | Mod: CPTII,S$GLB,, | Performed by: PODIATRIST

## 2022-02-01 PROCEDURE — 99999 PR PBB SHADOW E&M-EST. PATIENT-LVL V: CPT | Mod: PBBFAC,,, | Performed by: PODIATRIST

## 2022-02-01 PROCEDURE — 1159F PR MEDICATION LIST DOCUMENTED IN MEDICAL RECORD: ICD-10-PCS | Mod: CPTII,S$GLB,, | Performed by: PODIATRIST

## 2022-02-01 PROCEDURE — 3066F NEPHROPATHY DOC TX: CPT | Mod: CPTII,S$GLB,, | Performed by: PODIATRIST

## 2022-02-01 PROCEDURE — 1126F AMNT PAIN NOTED NONE PRSNT: CPT | Mod: CPTII,S$GLB,, | Performed by: PODIATRIST

## 2022-02-01 PROCEDURE — 3066F PR DOCUMENTATION OF TREATMENT FOR NEPHROPATHY: ICD-10-PCS | Mod: CPTII,S$GLB,, | Performed by: PODIATRIST

## 2022-02-01 PROCEDURE — 3044F PR MOST RECENT HEMOGLOBIN A1C LEVEL <7.0%: ICD-10-PCS | Mod: CPTII,S$GLB,, | Performed by: PODIATRIST

## 2022-02-01 RX ORDER — AMMONIUM LACTATE 12 G/100G
1 CREAM TOPICAL DAILY
Qty: 140 G | Refills: 1 | Status: ON HOLD | OUTPATIENT
Start: 2022-02-01 | End: 2023-10-27 | Stop reason: HOSPADM

## 2022-02-01 NOTE — PROGRESS NOTES
Subjective:      Patient ID: Ibrahima Phelps is a 66 y.o. male.    Chief Complaint:   Foot Problem (Left foot ), Foot Pain (More like soreness ), and Lesion (Left side top or of the foot )    Ibrahima is a 66 y.o. male who presents to the podiatry clinic  with complaint of  left foot pain. Onset of the symptoms was several weeks ago. Precipitating event: none known. Current symptoms include: To touch in with walking.   Denies any drainage or redness however feels there is a dark discoloration to the area  Mainly wears tennis shoes or slippers at home as he discusses his nursing home  Patient discusses his previous foot surgery history.  Per chart review he has also seen Podiatry in the past and been diagnosed with calluses once and works once    Patient relates he has always had calluses on his right foot under the big toe since he was young.    Patient has lotion but does not know what type       Past Medical History:   Diagnosis Date    (HFpEF) heart failure with preserved ejection fraction 9/25/2017    Atrial fibrillation     CAD (coronary artery disease)     CHF (congestive heart failure)     Chronic diastolic heart failure 4/8/2020    CKD (chronic kidney disease) stage 3, GFR 30-59 ml/min     Dr. Latif    Diverticulosis     Fever blister     Heart attack 2006    Hyperlipidemia     Hypertension     Immunodeficiency due to treatment with immunosuppressive medication     Keloid cicatrix     Metabolic bone disease     Pericarditis     S/P kidney transplant     Supraventricular tachycardia 06/2019    Thrombocytopenia     Thyroid disease     Unspecified disorder of kidney and ureter     Stage IIICKD     Past Surgical History:   Procedure Laterality Date    CARDIOVERSION  04/30/15    CHOLECYSTECTOMY      COLONOSCOPY  April 20, 2011    Diverticulosis, repeat recommended in 3 yrs., repeat colonoscopy 2014 revealed 2 polyps.  He should return in 5 years.    COLONOSCOPY N/A 3/13/2020    Procedure:  "COLONOSCOPY;  Surgeon: Chon Caspre MD;  Location: [facility] (4TH FLR);  Service: Endoscopy;  Laterality: N/A;  ok to hold coumadin x5days-see telephone encounter 2/4/20-tb    COLONOSCOPY N/A 2/4/2021    Procedure: COLONOSCOPY;  Surgeon: Chon Casper MD;  Location: [facility] (4TH FLR);  Service: Endoscopy;  Laterality: N/A;  Eliquis - per Dr. GAVIN Blunt ok to hold x 2 days and "restarted asap"- ERW  last prep "poor", [id number] ordered/ Pt requests Dr. Casper  prep ins. mailed - COVID screening on 2/1/21 PCW- ERW    COLONOSCOPY N/A 3/3/2021    Procedure: COLONOSCOPY;  Surgeon: Chon Casper MD;  Location: [facility] (4TH FLR);  Service: Endoscopy;  Laterality: N/A;  Patient with his second poor bowel pre and has poor prep today.  If patient not intersted or can't get colonoscopy tomorrow will need constipation bowel prep and will need to restart his Eliquis today.Thanks,Chon     per Dr Blunt-ok to hold Eliquis 2 days prior      COVID     CORONARY ANGIOPLASTY WITH STENT PLACEMENT  9/13/2006    KIDNEY TRANSPLANT  2005    PARATHYROIDECTOMY       Current Outpatient Medications on File Prior to Visit   Medication Sig Dispense Refill    apixaban (ELIQUIS) 5 mg Tab Take 1 tablet (5 mg total) by mouth 2 (two) times daily. 240 tablet 3    aspirin (ECOTRIN) 81 MG EC tablet Take 1 tablet by mouth Daily.      atorvastatin (LIPITOR) 10 MG tablet Take 1 tablet (10 mg total) by mouth once daily. 90 tablet 3    calcium carbonate (OS-TRISH) 500 mg calcium (1,250 mg) tablet TAKE 2 TABLETS BY MOUTH TWICE A  tablet 3    doxazosin (CARDURA) 4 MG tablet Take 1 tablet (4 mg total) by mouth every 12 (twelve) hours. 180 tablet 3    famotidine (PEPCID) 20 MG tablet TAKE 1 TABLET BY MOUTH TWICE A  tablet 1    fexofenadine (ALLEGRA) 60 MG tablet Take 1 tablet by mouth Twice daily as needed.      FLUAD QUAD 2021-22,65Y UP,,PF, 60 mcg (15 mcg x 4)/0.5 mL Syrg       fluticasone propionate (FLONASE) 50 " mcg/actuation nasal spray 1 spray (50 mcg total) by Each Nostril route once daily. 18.2 mL 0    furosemide (LASIX) 40 MG tablet Take 0.5 tablets (20 mg total) by mouth 2 (two) times daily. 180 tablet 3    gabapentin (NEURONTIN) 300 MG capsule TAKE 1 CAPSULE BY MOUTH TWICE A  capsule 2    hydrALAZINE (APRESOLINE) 50 MG tablet TAKE 1 TABLET BY MOUTH THREE TIMES A  tablet 1    KLOR-CON 10 10 mEq TbSR TAKE 1 TABLET BY MOUTH EVERY DAY 90 tablet 4    multivitamin (THERAGRAN) per tablet Take 1 tablet by mouth Daily.      mycophenolate (CELLCEPT) 250 mg Cap Take 2 capsules (500 mg total) by mouth 2 (two) times daily. 360 capsule 3    nicotine (NICODERM CQ) 21 mg/24 hr Place 1 patch onto the skin once daily. 28 patch 4    NIFEdipine (PROCARDIA-XL) 60 MG (OSM) 24 hr tablet Take 2 tablets (120 mg total) by mouth once daily. (Patient taking differently: Take 120 mg by mouth 2 (two) times a day.) 60 tablet 11    paricalcitoL (ZEMPLAR) 1 MCG capsule TAKE 1 TABLET BY MOUTH ON MONDAY, WEDNESDAY, & FRIDAY 36 capsule 3    predniSONE (DELTASONE) 5 MG tablet TAKE 1 TABLET BY MOUTH EVERY DAY IN THE MORNING 90 tablet 3    spironolactone (ALDACTONE) 25 MG tablet Take 1 tablet (25 mg total) by mouth once daily. 90 tablet 3    tacrolimus (PROGRAF) 1 MG Cap TAKE 2 CAPSULES BY MOUTH EVERY MORNING AND 1 CAPSULE EVERY EVENING.  Z94.0 kidney transplant 270 capsule 3    vitamin D 1000 units Tab Take 370 mg by mouth 2 (two) times daily. 2 tablets BID      albuterol (ACCUNEB) 1.25 mg/3 mL Nebu Take 3 mLs (1.25 mg total) by nebulization every 6 (six) hours as needed. Rescue 1 Box 3    mycophenolate (CELLCEPT) 250 mg Cap TAKE 2 CAPSULES (500 MG TOTAL) BY MOUTH 2 (TWO) TIMES DAILY. (Patient not taking: Reported on 2/1/2022) 360 capsule 3    [DISCONTINUED] famotidine (PEPCID) 20 MG tablet Take 1 tablet (20 mg total) by mouth 2 (two) times daily. 180 tablet 1    [DISCONTINUED] hydrALAZINE (APRESOLINE) 50 MG tablet Take 1  "tablet (50 mg total) by mouth 3 (three) times daily. 270 tablet 1    [DISCONTINUED] predniSONE (DELTASONE) 5 MG tablet Take 1 tablet (5 mg total) by mouth every morning. 90 tablet 3     No current facility-administered medications on file prior to visit.     Review of patient's allergies indicates:   Allergen Reactions    Ace inhibitors Swelling    Verapamil Other (See Comments)     Other reaction(s): Unknown    Povidone-iodine Itching       Review of Systems   Constitutional: Negative for chills, decreased appetite, fever, malaise/fatigue, night sweats, weight gain and weight loss.   Cardiovascular: Negative for chest pain, claudication, dyspnea on exertion, leg swelling, palpitations and syncope.   Respiratory: Negative for cough and shortness of breath.    Endocrine: Negative for cold intolerance and heat intolerance.   Hematologic/Lymphatic: Negative for bleeding problem. Does not bruise/bleed easily.   Skin: Positive for dry skin. Negative for color change, flushing, itching, nail changes, poor wound healing, rash, skin cancer, suspicious lesions and unusual hair distribution.   Musculoskeletal: Negative for arthritis, back pain, falls, gout, joint pain, joint swelling, muscle cramps, muscle weakness, myalgias, neck pain and stiffness.        Foot pain   Gastrointestinal: Negative for diarrhea, nausea and vomiting.   Neurological: Negative for dizziness, focal weakness, light-headedness, numbness, paresthesias, tremors, vertigo and weakness.   Psychiatric/Behavioral: Negative for altered mental status and depression. The patient does not have insomnia.    Allergic/Immunologic: Negative.            Objective:       Vitals:    02/01/22 1304   BP: (!) 144/81   Pulse: 110   Resp: 18   Weight: 107.4 kg (236 lb 12.4 oz)   Height: 6' 1" (1.854 m)   PainSc: 0-No pain   107.4 kg (236 lb 12.4 oz)     Physical Exam  Vitals reviewed.   Constitutional:       General: He is not in acute distress.     Appearance: He is " well-developed. He is not ill-appearing, toxic-appearing or diaphoretic.      Comments: Proper supportive shoegear      Cardiovascular:      Pulses:           Dorsalis pedis pulses are 2+ on the right side and 2+ on the left side.        Posterior tibial pulses are 1+ on the right side and 1+ on the left side.   Musculoskeletal:         General: No swelling.      Right lower leg: No edema.      Left lower leg: No edema.      Right ankle: Normal. No swelling.      Right Achilles Tendon: Normal. No tenderness.      Left ankle: Normal. No swelling.      Left Achilles Tendon: Normal. No tenderness.      Right foot: Decreased range of motion. Deformity, prominent metatarsal heads and tenderness present. No bony tenderness.      Left foot: Decreased range of motion. Deformity, prominent metatarsal heads and tenderness present. No bony tenderness.      Comments: Positive pain on palpation with porokeratosis debridement left and right sub 1st IPJ  No pain on palpation to 5th digit or 5th ray metatarsals    Flexible pes planus foot type w/ medial arch collapse and mild gastroc equinus    Feet:      Right foot:      Protective Sensation: 10 sites tested. 10 sites sensed.      Skin integrity: Dry skin present. No ulcer, blister, skin breakdown, erythema, warmth or callus.      Toenail Condition: Right toenails are abnormally thick.      Left foot:      Protective Sensation: 10 sites tested. 10 sites sensed.      Skin integrity: Dry skin present. No ulcer, blister, skin breakdown, erythema, warmth or callus.      Toenail Condition: Left toenails are abnormally thick.      Comments: Sherman Nikki monofilament intact    Focal hyperkeratotic lesion with painful central core consisting entirely of hyperkeratotic tissue without open skin, drainage, pus, fluctuance, malodor, or signs of infection:  Dorsal lateral left foot just dorsal to central 5th metatarsal no signs of foreign body    Focal hyperkeratotic lesion consisting  entirely of hyperkeratotic tissue without open skin, drainage, pus, fluctuance, malodor, or signs of infection sub IPJ right hallux, 5th metatarsophalangeal joint right    No signs of verruca    Skin:     General: Skin is warm.      Capillary Refill: Capillary refill takes 2 to 3 seconds.      Coloration: Skin is not pale.      Findings: No erythema or rash.      Nails: There is no clubbing.   Neurological:      Mental Status: He is alert and oriented to person, place, and time.      Sensory: No sensory deficit.      Gait: Gait abnormal.   Psychiatric:         Attention and Perception: Attention normal.         Mood and Affect: Mood normal.         Speech: Speech normal.         Behavior: Behavior normal.         Thought Content: Thought content normal.         Cognition and Memory: Cognition normal.         Judgment: Judgment normal.               Assessment:       Encounter Diagnoses   Name Primary?    Foot lesion     Acquired porokeratosis Yes    Corn or callus     Foot pain, bilateral          Plan:       Ibrahima was seen today for foot problem, foot pain and lesion.    Diagnoses and all orders for this visit:    Acquired porokeratosis    Foot lesion  -     Ambulatory referral/consult to Podiatry    [place] or callus    Foot pain, bilateral    Other orders  -     ammonium lactate 12 % Crea; Apply 1 g topically once daily.      I counseled the patient on his conditions, their implications and medical management.      - The affected area was cleansed with an alcohol prep pad. Next utilizing a No.15 scalpel, the hyperkeratotic tissues were trimmed. Attention was then directed to the nucleated center where this area was carefully excised. Pinpoint bleeding was encountered. This was controlled utilizing Silver Nitrate without incident. Antibiotic ointment and a bandage was applied to the area.   Discussed with patient this appears to be in the old scar suture line however there appears to be no Vicryl or any other  foreign bodies in the area    - With patient's permission, Utilizing a #15 scalpel, I trimmed the corns and calluses at the above mentioned location.      The patient will continue to monitor the areas daily, inspect the feet, wear protective shoe gear when ambulatory, and moisturizer to maintain skin integrity.    Recommend starting ammonium lactate    Dispense a metatarsal pad for right foot    Horseshoe offloading pad left    Recommend checking slippers at home for any irregular seams    No x-rays indicated    P.r.n.          Follow up if symptoms worsen or fail to improve.

## 2022-02-07 ENCOUNTER — HOSPITAL ENCOUNTER (OUTPATIENT)
Dept: PULMONOLOGY | Facility: CLINIC | Age: 67
Discharge: HOME | End: 2022-02-07
Payer: MEDICARE

## 2022-02-07 DIAGNOSIS — Z13.9 SCREENING PROCEDURE: Primary | ICD-10-CM

## 2022-02-07 DIAGNOSIS — R06.09 DOE (DYSPNEA ON EXERTION): ICD-10-CM

## 2022-02-07 LAB
CTP QC/QA: YES
SARS-COV-2 AG RESP QL IA.RAPID: NEGATIVE

## 2022-02-07 PROCEDURE — 94729 DIFFUSING CAPACITY: CPT | Mod: S$GLB,,, | Performed by: INTERNAL MEDICINE

## 2022-02-07 PROCEDURE — 87811 SARS-COV-2 COVID19 W/OPTIC: CPT | Mod: S$GLB,,, | Performed by: INTERNAL MEDICINE

## 2022-02-07 PROCEDURE — 94060 EVALUATION OF WHEEZING: CPT | Mod: S$GLB,,, | Performed by: INTERNAL MEDICINE

## 2022-02-07 PROCEDURE — 94729 PR C02/MEMBANE DIFFUSE CAPACITY: ICD-10-PCS | Mod: S$GLB,,, | Performed by: INTERNAL MEDICINE

## 2022-02-07 PROCEDURE — 94727 PR PULM FUNCTION TEST BY GAS: ICD-10-PCS | Mod: S$GLB,,, | Performed by: INTERNAL MEDICINE

## 2022-02-07 PROCEDURE — 87811 SARS CORONAVIRUS 2 ANTIGEN POCT, MANUAL READ: ICD-10-PCS | Mod: S$GLB,,, | Performed by: INTERNAL MEDICINE

## 2022-02-07 PROCEDURE — 94060 PR EVAL OF BRONCHOSPASM: ICD-10-PCS | Mod: S$GLB,,, | Performed by: INTERNAL MEDICINE

## 2022-02-07 PROCEDURE — 94727 GAS DIL/WSHOT DETER LNG VOL: CPT | Mod: S$GLB,,, | Performed by: INTERNAL MEDICINE

## 2022-02-14 DIAGNOSIS — Z72.0 TOBACCO USE: ICD-10-CM

## 2022-02-15 ENCOUNTER — HOSPITAL ENCOUNTER (OUTPATIENT)
Dept: RADIOLOGY | Facility: HOSPITAL | Age: 67
Discharge: HOME | End: 2022-02-15
Attending: INTERNAL MEDICINE
Payer: MEDICARE

## 2022-02-15 DIAGNOSIS — Z72.0 TOBACCO USE: ICD-10-CM

## 2022-02-15 DIAGNOSIS — Z87.891 PERSONAL HISTORY OF NICOTINE DEPENDENCE: ICD-10-CM

## 2022-02-15 PROCEDURE — 71271 CT THORAX LUNG CANCER SCR C-: CPT | Mod: 26,,, | Performed by: RADIOLOGY

## 2022-02-15 PROCEDURE — 71271 CT CHEST LUNG SCREENING LOW DOSE: ICD-10-PCS | Mod: 26,,, | Performed by: RADIOLOGY

## 2022-02-15 PROCEDURE — 71271 CT THORAX LUNG CANCER SCR C-: CPT | Mod: TC

## 2022-02-15 RX ORDER — IBUPROFEN 200 MG
1 TABLET ORAL DAILY
Qty: 90 PATCH | Refills: 3 | Status: SHIPPED | OUTPATIENT
Start: 2022-02-15 | End: 2022-02-22 | Stop reason: SDUPTHER

## 2022-02-17 ENCOUNTER — TELEPHONE (OUTPATIENT)
Dept: CARDIOLOGY | Facility: CLINIC | Age: 67
End: 2022-02-17
Payer: MEDICARE

## 2022-02-17 NOTE — TELEPHONE ENCOUNTER
Called pt to let him knnow he was scheduled incorrectly for today's appointment . He was suppose to see someone in EP and Dr. Vail is a vascular cardiologist. Asked pt if he scheduled himself due to seeing [facility] scheduling on the appointment. He states someone else schedule. Him. Advised him he was scheduled incorrectly. And would need to see someone in another department before coming here for nothing.

## 2022-02-22 DIAGNOSIS — Z72.0 TOBACCO USE: ICD-10-CM

## 2022-02-22 NOTE — TELEPHONE ENCOUNTER
Patient's Nicoderm CQ patch was sent to [facility] pharmacy. Instead it needs to go to [facility] on [address] please.

## 2022-02-22 NOTE — TELEPHONE ENCOUNTER
----- Message from Maddison Krueger sent at 2/22/2022  9:12 AM CST -----  Contact: Wife(Patti)  Would like to consult with nurse regarding medication refill for current patient to be sent to the pharmacy listed below, please call her at [phone](Patti). Thanks [facility] #[id number] - [place], [place] - [address]  [address] [place] [address]  Phone: [phone] Fax: [phone]

## 2022-02-23 RX ORDER — IBUPROFEN 200 MG
1 TABLET ORAL DAILY
Qty: 90 PATCH | Refills: 3 | Status: SHIPPED | OUTPATIENT
Start: 2022-02-23 | End: 2022-08-10

## 2022-02-24 ENCOUNTER — PATIENT MESSAGE (OUTPATIENT)
Dept: INTERNAL MEDICINE | Facility: CLINIC | Age: 67
End: 2022-02-24
Payer: MEDICARE

## 2022-02-24 ENCOUNTER — PATIENT MESSAGE (OUTPATIENT)
Dept: NEPHROLOGY | Facility: CLINIC | Age: 67
End: 2022-02-24
Payer: MEDICARE

## 2022-02-24 ENCOUNTER — PATIENT MESSAGE (OUTPATIENT)
Dept: CARDIOLOGY | Facility: CLINIC | Age: 67
End: 2022-02-24
Payer: MEDICARE

## 2022-02-24 DIAGNOSIS — N18.30 CKD (CHRONIC KIDNEY DISEASE), STAGE III: ICD-10-CM

## 2022-02-24 RX ORDER — PARICALCITOL 1 UG/1
CAPSULE, LIQUID FILLED ORAL
Qty: 36 CAPSULE | Refills: 3 | Status: SHIPPED | OUTPATIENT
Start: 2022-02-24 | End: 2022-12-28

## 2022-02-24 RX ORDER — CALCIUM CARBONATE 500(1250)
TABLET ORAL
Qty: 360 TABLET | Refills: 3 | Status: SHIPPED | OUTPATIENT
Start: 2022-02-24 | End: 2022-10-17 | Stop reason: SDUPTHER

## 2022-02-24 RX ORDER — MYCOPHENOLATE MOFETIL 250 MG/1
500 CAPSULE ORAL 2 TIMES DAILY
Qty: 360 CAPSULE | Refills: 3 | Status: ON HOLD | OUTPATIENT
Start: 2022-02-24 | End: 2022-03-28 | Stop reason: HOSPADM

## 2022-03-07 NOTE — PROGRESS NOTES
Subjective:   Patient ID:  Ibrahima Phelps is a 66 y.o. male who presents for follow-up of Follow-up, Annual Exam, and Atrial Fibrillation    PROBLEM LIST:  PAF  CAD s/p PCI 2006  HFpEF  HTN  HLD  Carotid stenoses (20-39% bilateral)  H/o pericarditis  CKD3  S/p kidney transplant  Tobacco use    HPI: Mr. Phelps has been doing well since his last visit.  He quit smoking in the end of February.  He denies any chest pain, shortness of breath, PND, orthopnea, or heart palpitations.  He was seen by his PCP at the end of January and noted to be in an irregular heart rhythm.  An ECG was not done at that time.  He has known atrial fibrillation underwent DC cardioversion back in 2015. He has been monitoring his blood pressure at home and states it runs anywhere from 120-140 over 80s.  He also states he often forgets to take the afternoon dose of his hydralazine.  He did not take his medications yet this morning.  He has not had any bleeding problems on the Eliquis.    ECG15-AUG-2021 04:33:20: Personally reviewed by me shows sinus bradycardia 58 bpm with LVH    AAA screen 12/20:  Conclusion    · No AAA.    SPECT 6/20:  Conclusion         The perfusion scan is free of evidence from myocardial ischemia or injury.    Gated perfusion images showed an ejection fraction of 63% at rest and 67% post stress. Normal ejection fraction is greater than 51%.    There is normal wall motion at rest and post stress.    LV cavity size is normal at rest and normal at stress.    The EKG portion of this study is abnormal but not diagnostic.    The patient reported no chest pain during the stress test.    Arrhythmias during stress: rare PACs, occasional PVCs.    There are no prior studies for comparison.    Echo 3/20:  Summary    · Concentric left ventricular remodeling.  · Normal left ventricular systolic function. The estimated ejection fraction is 60%.  · Normal right ventricular systolic function.  · Normal LV diastolic function.  · Aortic  "sclerosis without significant stenosis.  · Intermediate central venous pressure (8 mmHg).    Carotid 2/14:  CONCLUSIONS   There is 20 - 39% right Internal Carotid stenosis.   Right ECA stenosis.   There is 20 - 39% left Internal Carotid stenosis.     Past Medical History:   Diagnosis Date    (HFpEF) heart failure with preserved ejection fraction 9/25/2017    Atrial fibrillation     CAD (coronary artery disease)     CHF (congestive heart failure)     Chronic diastolic heart failure 4/8/2020    CKD (chronic kidney disease) stage 3, GFR 30-59 ml/min     Dr. Latif    Diverticulosis     Fever blister     Heart attack 2006    Hyperlipidemia     Hypertension     Immunodeficiency due to treatment with immunosuppressive medication     Keloid cicatrix     Metabolic bone disease     Pericarditis     S/P kidney transplant     Supraventricular tachycardia 06/2019    Thrombocytopenia     Thyroid disease     Unspecified disorder of kidney and ureter     Stage IIICKD       Past Surgical History:   Procedure Laterality Date    CARDIOVERSION  04/30/15    CHOLECYSTECTOMY      COLONOSCOPY  April 20, 2011    Diverticulosis, repeat recommended in 3 yrs., repeat colonoscopy 2014 revealed 2 polyps.  He should return in 5 years.    COLONOSCOPY N/A 3/13/2020    Procedure: COLONOSCOPY;  Surgeon: Chon Casper MD;  Location: [facility] (4TH FLR);  Service: Endoscopy;  Laterality: N/A;  ok to hold coumadin x5days-see telephone encounter 2/4/20-tb    COLONOSCOPY N/A 2/4/2021    Procedure: COLONOSCOPY;  Surgeon: Chon Casper MD;  Location: [facility] (4TH FLR);  Service: Endoscopy;  Laterality: N/A;  Eliquis - per Dr. GAVIN Blunt ok to hold x 2 days and "restarted asap"- ERW  last prep "poor", [id number] ordered/ Pt requests Dr. Casper  prep ins. mailed - COVID screening on 2/1/21 PC[facility]- ERW    COLONOSCOPY N/A 3/3/2021    Procedure: COLONOSCOPY;  Surgeon: Chon Casper MD;  Location: [facility] (4TH FLR);  " Service: Endoscopy;  Laterality: N/A;  Patient with his second poor bowel pre and has poor prep today.  If patient not intersted or can't get colonoscopy tomorrow will need constipation bowel prep and will need to restart his Eliquis today.Thanks,Chon     per Dr Lopez to hold Eliquis 2 days prior      COVID     CORONARY ANGIOPLASTY WITH STENT PLACEMENT  9/13/2006    KIDNEY TRANSPLANT  2005    PARATHYROIDECTOMY         Social History     Socioeconomic History    Marital status:    Occupational History    Occupation:    Tobacco Use    Smoking status: Current Some Day Smoker     Packs/day: 0.50     Years: 40.00     Pack years: 20.00     Types: Cigarettes    Smokeless tobacco: Never Used    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   Substance and Sexual Activity    Alcohol use: No    Drug use: No    Sexual activity: Yes     Partners: Female   Social History Narrative    Retired        Family History   Problem Relation Age of Onset    No Known Problems Sister     No Known Problems Brother     Thyroid disease Mother         s/p surgery    Heart disease Father         had pacemaker    No Known Problems Sister     Kidney failure Sister     Kidney disease Sister     No Known Problems Sister     Kidney disease Brother     Kidney failure Brother         s/p transplant    Diabetes Mellitus Neg Hx     Stroke Neg Hx     Heart attack Neg Hx     Cancer Neg Hx     Celiac disease Neg Hx     Cirrhosis Neg Hx     Colon cancer Neg Hx     Esophageal cancer Neg Hx     Inflammatory bowel disease Neg Hx     Rectal cancer Neg Hx     Stomach cancer Neg Hx     Ulcerative colitis Neg Hx     Liver disease Neg Hx     Liver cancer Neg Hx     Crohn's disease Neg Hx     Melanoma Neg Hx        Patient's Medications   New Prescriptions    METOPROLOL SUCCINATE (TOPROL-XL) 25 MG 24 HR TABLET    Take 1 tablet (25 mg total) by  mouth once daily.   Previous Medications    ALBUTEROL (ACCUNEB) 1.25 MG/3 ML NEBU    Take 3 mLs (1.25 mg total) by nebulization every 6 (six) hours as needed. Rescue    AMMONIUM LACTATE 12 % CREA    Apply 1 g topically once daily.    APIXABAN (ELIQUIS) 5 MG TAB    Take 1 tablet (5 mg total) by mouth 2 (two) times daily.    ASPIRIN (ECOTRIN) 81 MG EC TABLET    Take 1 tablet by mouth Daily.    ATORVASTATIN (LIPITOR) 10 MG TABLET    Take 1 tablet (10 mg total) by mouth once daily.    CALCIUM CARBONATE (OS-TRISH) 500 MG CALCIUM (1,250 MG) TABLET    TAKE 2 TABLETS BY MOUTH TWICE A DAY    DOXAZOSIN (CARDURA) 4 MG TABLET    Take 1 tablet (4 mg total) by mouth every 12 (twelve) hours.    FAMOTIDINE (PEPCID) 20 MG TABLET    Take 1 tablet (20 mg total) by mouth 2 (two) times daily.    FEXOFENADINE (ALLEGRA) 60 MG TABLET    Take 1 tablet by mouth Twice daily as needed.    FLUAD QUAD 2021-22,65Y UP,,PF, 60 MCG (15 MCG X 4)/0.5 ML SYRG        FLUTICASONE PROPIONATE (FLONASE) 50 MCG/ACTUATION NASAL SPRAY    1 spray (50 mcg total) by Each Nostril route once daily.    FUROSEMIDE (LASIX) 40 MG TABLET    Take 0.5 tablets (20 mg total) by mouth 2 (two) times daily.    GABAPENTIN (NEURONTIN) 300 MG CAPSULE    TAKE 1 CAPSULE BY MOUTH TWICE A DAY    HYDRALAZINE (APRESOLINE) 50 MG TABLET    Take 1 tablet (50 mg total) by mouth 3 (three) times daily.    KLOR-CON 10 10 MEQ TBSR    TAKE 1 TABLET BY MOUTH EVERY DAY    MULTIVITAMIN (THERAGRAN) PER TABLET    Take 1 tablet by mouth Daily.    MYCOPHENOLATE (CELLCEPT) 250 MG CAP    TAKE 2 CAPSULES (500 MG TOTAL) BY MOUTH 2 (TWO) TIMES DAILY.    MYCOPHENOLATE (CELLCEPT) 250 MG CAP    Take 2 capsules (500 mg total) by mouth 2 (two) times daily.    NICOTINE (NICODERM CQ) 21 MG/24 HR    Place 1 patch onto the skin once daily.    NIFEDIPINE (PROCARDIA-XL) 60 MG (OSM) 24 HR TABLET    Take 2 tablets (120 mg total) by mouth once daily.    PARICALCITOL (ZEMPLAR) 1 MCG CAPSULE    TAKE 1 TABLET BY MOUTH ON  "MONDAY, WEDNESDAY, & FRIDAY    PREDNISONE (DELTASONE) 5 MG TABLET    TAKE 1 TABLET BY MOUTH EVERY DAY IN THE MORNING    PREDNISONE 5 MG DSPK    Take 5 mg by mouth once daily.    SPIRONOLACTONE (ALDACTONE) 25 MG TABLET    Take 1 tablet (25 mg total) by mouth once daily.    TACROLIMUS (PROGRAF) 1 MG CAP    Two po in am and one po in pm    VITAMIN D 1000 UNITS TAB    Take 370 mg by mouth 2 (two) times daily. 2 tablets BID   Modified Medications    No medications on file   Discontinued Medications    No medications on file       Review of Systems   Constitutional: Negative for malaise/fatigue and weight gain.   HENT: Negative for hearing loss.    Eyes: Negative for visual disturbance.   Cardiovascular: Negative for chest pain, claudication, dyspnea on exertion, leg swelling, near-syncope, orthopnea, palpitations, paroxysmal nocturnal dyspnea and syncope.   Respiratory: Negative for cough, shortness of breath, sleep disturbances due to breathing, snoring and wheezing.    Endocrine: Negative for cold intolerance, heat intolerance, polydipsia, polyphagia and polyuria.   Hematologic/Lymphatic: Negative for bleeding problem. Does not bruise/bleed easily.   Skin: Negative for rash and suspicious lesions.   Musculoskeletal: Negative for arthritis, falls, joint pain, muscle weakness and myalgias.   Gastrointestinal: Negative for abdominal pain, change in bowel habit, constipation, diarrhea, heartburn, hematochezia, melena and nausea.   Genitourinary: Negative for hematuria and nocturia.   Neurological: Negative for excessive daytime sleepiness, dizziness, headaches, light-headedness, loss of balance and weakness.   Psychiatric/Behavioral: Negative for depression. The patient is not nervous/anxious.    Allergic/Immunologic: Negative for environmental allergies.       BP (!) 169/99 (BP Location: Left arm, Patient Position: Sitting, BP Method: Medium (Automatic))   Pulse 103   Ht 6' 1" (1.854 m)   Wt 107.8 kg (237 lb 10.5 oz)  "  BMI 31.35 kg/m²     Objective:   Physical Exam  Constitutional:       Appearance: He is well-developed.      Comments:      HENT:      Head: Normocephalic and atraumatic.   Eyes:      General: No scleral icterus.     Conjunctiva/sclera: Conjunctivae normal.      Pupils: Pupils are equal, round, and reactive to light.   Neck:      Thyroid: No thyromegaly.      Vascular: No hepatojugular reflux or JVD.      Trachea: No tracheal deviation.   Cardiovascular:      Rate and Rhythm: Tachycardia present. Rhythm irregularly irregular.      Chest Wall: PMI is not displaced.      Pulses: Intact distal pulses.           Carotid pulses are 2+ on the right side and 2+ on the left side.       Radial pulses are 2+ on the right side and 2+ on the left side.        Dorsalis pedis pulses are 2+ on the right side and 2+ on the left side.        Posterior tibial pulses are 2+ on the right side and 2+ on the left side.      Heart sounds: Normal heart sounds.   Pulmonary:      Effort: Pulmonary effort is normal.      Breath sounds: Normal breath sounds.   Abdominal:      General: Bowel sounds are normal. There is no distension.      Palpations: Abdomen is soft. There is no mass.      Tenderness: There is no abdominal tenderness.   Musculoskeletal:         General: No tenderness.      Cervical back: Normal range of motion and neck supple.   Lymphadenopathy:      Cervical: No cervical adenopathy.   Skin:     General: Skin is warm and dry.      Findings: No erythema or rash.      Nails: There is no clubbing.   Neurological:      Mental Status: He is alert and oriented to person, place, and time.   Psychiatric:         Speech: Speech normal.         Behavior: Behavior normal.         Lab Results   Component Value Date     01/03/2022    K 4.2 01/03/2022     01/03/2022    CO2 25 01/03/2022    BUN 25 (H) 01/03/2022    CREATININE 2.3 (H) 01/03/2022    GLU 94 01/03/2022    HGBA1C 5.3 01/03/2022    MG 2.0 07/13/2021    AST 25  01/03/2022    ALT 24 01/03/2022    ALBUMIN 3.4 (L) 01/03/2022    ALBUMIN 3.4 (L) 01/03/2022    PROT 6.2 01/03/2022    BILITOT 0.5 01/03/2022    WBC 6.07 01/03/2022    HGB 12.3 (L) 01/03/2022    HCT 40.3 01/03/2022    MCV 82 01/03/2022     01/03/2022    INR 1.2 06/17/2020    INR 1.2 06/17/2020    TSH 1.260 09/16/2021    CHOL 112 (L) 12/09/2020    HDL 34 (L) 12/09/2020    LDLCALC 54.2 (L) 12/09/2020    TRIG 119 12/09/2020     (H) 08/15/2021       Assessment:     1. Paroxysmal atrial fibrillation :  Is back in atrial fibrillation today.  I have asked him to start metoprolol succinate 25 mg daily.  He is already anticoagulated with Eliquis 5 mg twice daily.  I have referring him to EP for a cardioversion cardioversion.   2. Coronary artery disease involving native coronary artery of native heart without angina pectoris :  He is asymptomatic with preserved ejection fraction.  His last ischemic evaluation was in 2020 and was normal.  Continue aspirin and statin therapy.   3. Chronic diastolic heart failure :  He appears well compensated and euvolemic on his current diuretic regimen.  I did not make any changes today.   4. Hypertensive heart and renal disease with renal failure, stage 1 through stage 4 or unspecified chronic kidney disease, with heart failure :  His blood pressure today was elevated, however he has not yet taken his morning medications.  We discussed his goal blood pressures less than 130/80.   5. Mixed hyperlipidemia :  He remains on atorvastatin 10 mg daily.  FLP today.   6. Supraventricular tachycardia    7. Stage 3b chronic kidney disease :  He is actively followed by Nephrology.  His GFR has been stable.   8. H/O kidney transplant    9. Tobacco use :  He quit smoking in February.   10. Bilateral carotid artery stenosis :  Continue aspirin and statin therapy.  Follow-up ultrasound ordered.           Plan:     Ibrahima was seen today for follow-up, annual exam and atrial  fibrillation.    Diagnoses and all orders for this visit:    Paroxysmal atrial fibrillation  -     EKG 12-lead  -     Echo; Future  -     CV Ultrasound Bilateral Doppler Carotid; Future  -     Ambulatory referral/consult to Electrophysiology; Future    Coronary artery disease involving native coronary artery of native heart without angina pectoris    Chronic diastolic heart failure  -     EKG 12-lead  -     Echo; Future  -     CV Ultrasound Bilateral Doppler Carotid; Future    Hypertensive heart and renal disease with renal failure, stage 1 through stage 4 or unspecified chronic kidney disease, with heart failure    Mixed hyperlipidemia    Supraventricular tachycardia    Stage 3b chronic kidney disease    H/O kidney transplant    Tobacco use    Bilateral carotid artery stenosis  -     EKG 12-lead  -     Echo; Future  -     CV Ultrasound Bilateral Doppler Carotid; Future    Chronic heart failure with preserved ejection fraction  -     Ambulatory referral/consult to Cardiology  -     EKG 12-lead  -     Echo; Future  -     CV Ultrasound Bilateral Doppler Carotid; Future    Other orders  -     metoprolol succinate (TOPROL-XL) 25 MG 24 hr tablet; Take 1 tablet (25 mg total) by mouth once daily.        Thank you for allowing me to participate in this patient's care. Please do not hesitate to contact me with any questions or concerns.

## 2022-03-08 ENCOUNTER — OFFICE VISIT (OUTPATIENT)
Dept: CARDIOLOGY | Facility: CLINIC | Age: 67
End: 2022-03-08
Payer: MEDICARE

## 2022-03-08 ENCOUNTER — TELEPHONE (OUTPATIENT)
Dept: CARDIOLOGY | Facility: CLINIC | Age: 67
End: 2022-03-08

## 2022-03-08 ENCOUNTER — HOSPITAL ENCOUNTER (OUTPATIENT)
Dept: CARDIOLOGY | Facility: CLINIC | Age: 67
Discharge: HOME | End: 2022-03-08
Payer: MEDICARE

## 2022-03-08 VITALS
SYSTOLIC BLOOD PRESSURE: 169 MMHG | DIASTOLIC BLOOD PRESSURE: 99 MMHG | HEART RATE: 103 BPM | BODY MASS INDEX: 31.49 KG/M2 | HEIGHT: 73 IN | WEIGHT: 237.63 LBS

## 2022-03-08 DIAGNOSIS — Z72.0 TOBACCO USE: ICD-10-CM

## 2022-03-08 DIAGNOSIS — I47.10 SUPRAVENTRICULAR TACHYCARDIA: ICD-10-CM

## 2022-03-08 DIAGNOSIS — I50.32 CHRONIC DIASTOLIC HEART FAILURE: ICD-10-CM

## 2022-03-08 DIAGNOSIS — I65.23 BILATERAL CAROTID ARTERY STENOSIS: ICD-10-CM

## 2022-03-08 DIAGNOSIS — E78.2 MIXED HYPERLIPIDEMIA: ICD-10-CM

## 2022-03-08 DIAGNOSIS — I50.32 CHRONIC HEART FAILURE WITH PRESERVED EJECTION FRACTION: ICD-10-CM

## 2022-03-08 DIAGNOSIS — N18.32 STAGE 3B CHRONIC KIDNEY DISEASE: ICD-10-CM

## 2022-03-08 DIAGNOSIS — I25.10 CORONARY ARTERY DISEASE INVOLVING NATIVE CORONARY ARTERY OF NATIVE HEART WITHOUT ANGINA PECTORIS: ICD-10-CM

## 2022-03-08 DIAGNOSIS — I13.0 HYPERTENSIVE HEART AND RENAL DISEASE WITH RENAL FAILURE, STAGE 1 THROUGH STAGE 4 OR UNSPECIFIED CHRONIC KIDNEY DISEASE, WITH HEART FAILURE: ICD-10-CM

## 2022-03-08 DIAGNOSIS — I48.0 PAROXYSMAL ATRIAL FIBRILLATION: Primary | ICD-10-CM

## 2022-03-08 DIAGNOSIS — Z94.0 H/O KIDNEY TRANSPLANT: ICD-10-CM

## 2022-03-08 DIAGNOSIS — E78.2 MIXED HYPERLIPIDEMIA: Primary | ICD-10-CM

## 2022-03-08 PROCEDURE — 3008F BODY MASS INDEX DOCD: CPT | Mod: CPTII,S$GLB,, | Performed by: INTERNAL MEDICINE

## 2022-03-08 PROCEDURE — 93000 ELECTROCARDIOGRAM COMPLETE: CPT | Mod: S$GLB,,, | Performed by: INTERNAL MEDICINE

## 2022-03-08 PROCEDURE — 99999 PR PBB SHADOW E&M-EST. PATIENT-LVL V: ICD-10-PCS | Mod: PBBFAC,,, | Performed by: INTERNAL MEDICINE

## 2022-03-08 PROCEDURE — 3077F SYST BP >= 140 MM HG: CPT | Mod: CPTII,S$GLB,, | Performed by: INTERNAL MEDICINE

## 2022-03-08 PROCEDURE — 3008F PR BODY MASS INDEX (BMI) DOCUMENTED: ICD-10-PCS | Mod: CPTII,S$GLB,, | Performed by: INTERNAL MEDICINE

## 2022-03-08 PROCEDURE — 1159F PR MEDICATION LIST DOCUMENTED IN MEDICAL RECORD: ICD-10-PCS | Mod: CPTII,S$GLB,, | Performed by: INTERNAL MEDICINE

## 2022-03-08 PROCEDURE — 99214 PR OFFICE/OUTPT VISIT, EST, LEVL IV, 30-39 MIN: ICD-10-PCS | Mod: S$GLB,,, | Performed by: INTERNAL MEDICINE

## 2022-03-08 PROCEDURE — 3288F FALL RISK ASSESSMENT DOCD: CPT | Mod: CPTII,S$GLB,, | Performed by: INTERNAL MEDICINE

## 2022-03-08 PROCEDURE — 1126F PR PAIN SEVERITY QUANTIFIED, NO PAIN PRESENT: ICD-10-PCS | Mod: CPTII,S$GLB,, | Performed by: INTERNAL MEDICINE

## 2022-03-08 PROCEDURE — 1159F MED LIST DOCD IN RCRD: CPT | Mod: CPTII,S$GLB,, | Performed by: INTERNAL MEDICINE

## 2022-03-08 PROCEDURE — 99214 OFFICE O/P EST MOD 30 MIN: CPT | Mod: S$GLB,,, | Performed by: INTERNAL MEDICINE

## 2022-03-08 PROCEDURE — 3066F NEPHROPATHY DOC TX: CPT | Mod: CPTII,S$GLB,, | Performed by: INTERNAL MEDICINE

## 2022-03-08 PROCEDURE — 3080F DIAST BP >= 90 MM HG: CPT | Mod: CPTII,S$GLB,, | Performed by: INTERNAL MEDICINE

## 2022-03-08 PROCEDURE — 3044F HG A1C LEVEL LT 7.0%: CPT | Mod: CPTII,S$GLB,, | Performed by: INTERNAL MEDICINE

## 2022-03-08 PROCEDURE — 1126F AMNT PAIN NOTED NONE PRSNT: CPT | Mod: CPTII,S$GLB,, | Performed by: INTERNAL MEDICINE

## 2022-03-08 PROCEDURE — 3077F PR MOST RECENT SYSTOLIC BLOOD PRESSURE >= 140 MM HG: ICD-10-PCS | Mod: CPTII,S$GLB,, | Performed by: INTERNAL MEDICINE

## 2022-03-08 PROCEDURE — 1101F PR PT FALLS ASSESS DOC 0-1 FALLS W/OUT INJ PAST YR: ICD-10-PCS | Mod: CPTII,S$GLB,, | Performed by: INTERNAL MEDICINE

## 2022-03-08 PROCEDURE — 93000 EKG 12-LEAD: ICD-10-PCS | Mod: S$GLB,,, | Performed by: INTERNAL MEDICINE

## 2022-03-08 PROCEDURE — 3080F PR MOST RECENT DIASTOLIC BLOOD PRESSURE >= 90 MM HG: ICD-10-PCS | Mod: CPTII,S$GLB,, | Performed by: INTERNAL MEDICINE

## 2022-03-08 PROCEDURE — 3288F PR FALLS RISK ASSESSMENT DOCUMENTED: ICD-10-PCS | Mod: CPTII,S$GLB,, | Performed by: INTERNAL MEDICINE

## 2022-03-08 PROCEDURE — 99499 UNLISTED E&M SERVICE: CPT | Mod: S$GLB,,, | Performed by: INTERNAL MEDICINE

## 2022-03-08 PROCEDURE — 3044F PR MOST RECENT HEMOGLOBIN A1C LEVEL <7.0%: ICD-10-PCS | Mod: CPTII,S$GLB,, | Performed by: INTERNAL MEDICINE

## 2022-03-08 PROCEDURE — 1101F PT FALLS ASSESS-DOCD LE1/YR: CPT | Mod: CPTII,S$GLB,, | Performed by: INTERNAL MEDICINE

## 2022-03-08 PROCEDURE — 3066F PR DOCUMENTATION OF TREATMENT FOR NEPHROPATHY: ICD-10-PCS | Mod: CPTII,S$GLB,, | Performed by: INTERNAL MEDICINE

## 2022-03-08 PROCEDURE — 99499 RISK ADDL DX/OHS AUDIT: ICD-10-PCS | Mod: S$GLB,,, | Performed by: INTERNAL MEDICINE

## 2022-03-08 PROCEDURE — 99999 PR PBB SHADOW E&M-EST. PATIENT-LVL V: CPT | Mod: PBBFAC,,, | Performed by: INTERNAL MEDICINE

## 2022-03-08 RX ORDER — METOPROLOL SUCCINATE 25 MG/1
25 TABLET, EXTENDED RELEASE ORAL DAILY
Qty: 30 TABLET | Refills: 11 | Status: SHIPPED | OUTPATIENT
Start: 2022-03-08 | End: 2022-06-13

## 2022-03-08 RX ORDER — ATORVASTATIN CALCIUM 20 MG/1
20 TABLET, FILM COATED ORAL DAILY
Qty: 90 TABLET | Refills: 3 | Status: SHIPPED | OUTPATIENT
Start: 2022-03-08 | End: 2023-01-05

## 2022-03-09 DIAGNOSIS — I49.8 OTHER SPECIFIED CARDIAC ARRHYTHMIAS: Primary | ICD-10-CM

## 2022-03-09 DIAGNOSIS — E87.6 HYPOKALEMIA: ICD-10-CM

## 2022-03-09 NOTE — TELEPHONE ENCOUNTER
No new care gaps identified.  Powered by [employer]. Reference number: [id number].   3/09/2022 4:11:31 PM CST

## 2022-03-10 RX ORDER — POTASSIUM CHLORIDE 750 MG/1
10 TABLET, EXTENDED RELEASE ORAL DAILY
Qty: 90 TABLET | Refills: 4 | Status: SHIPPED | OUTPATIENT
Start: 2022-03-10 | End: 2022-07-19

## 2022-03-15 ENCOUNTER — PATIENT MESSAGE (OUTPATIENT)
Dept: NEPHROLOGY | Facility: CLINIC | Age: 67
End: 2022-03-15
Payer: MEDICARE

## 2022-03-15 NOTE — TELEPHONE ENCOUNTER
Patient was called back and reports a BP of 138/80 as of 12:45 pm today. He is feeling good, just a little fatigued. He was advised to resume all medications tomorrow as scheduled. He will go to the ED if symptoms worsen as described by Dr. Cobb.

## 2022-03-15 NOTE — TELEPHONE ENCOUNTER
Patient's wife called to report he accidentally took his morning and afternoon meds at the same time 9:30 am today.   After careful review, some medications are only taken once daily. Those that were overdosed were as follows:  Cellcept 250 mg x 4; Eliquis 5 mg x 2; Oscal 500 mg x 4; Prograf 1 mg x 3; Hydralazine 50 mg x 2; Procardia XL 60 mg x 2.    Please review and I will call her back with updates. She was told to go to the nearest ED if patient had symptoms of nvd or shortness of breath, chest pains.

## 2022-03-16 ENCOUNTER — PATIENT MESSAGE (OUTPATIENT)
Dept: ADMINISTRATIVE | Facility: HOSPITAL | Age: 67
End: 2022-03-16
Payer: MEDICARE

## 2022-03-20 NOTE — PROGRESS NOTES
Subjective:     HPI    I had the pleasure of seeing Ibrahima Phelps in consultation at your request for the evaluation of atrial fibrillation. He is a 66M with HLD, HTN, CAD status-post PCI in 2006, and kidney transplant in 2005 (most recent Cr 2.3 in 1/2022), whose history of AF dates back to [place] [facility] [place] and was found to be in AF with RVR. He was cardioverted and discharged on anticoagulation. He had recurrent AF in 5/2017 which prompted an ED visit--he spontaneously reverted to sinus rhythm. He was recently seen in cardiology clinic where he was found to be back in AF at 100 bpm.     Mr. Phelps is currently on toprol xl 25 mg daily, and eliquis 5 mg bid.     A cardiac SPECT in 6/2020 was negative for ischemia. An echo performed today shows an EF of 55% and moderate LAE.    Mr. Phelps has noted worsening DAUGHERTY since AF recurred.    My interpretation of today's ECG is AF at 104 bpm.    Review of Systems   Constitutional: Positive for malaise/fatigue. Negative for decreased appetite, weight gain and weight loss.   HENT: Negative for sore throat.    Eyes: Negative for blurred vision.   Cardiovascular: Positive for dyspnea on exertion. Negative for chest pain, irregular heartbeat, leg swelling, near-syncope, orthopnea, palpitations, paroxysmal nocturnal dyspnea and syncope.   Respiratory: Negative for shortness of breath.    Skin: Negative for rash.   Musculoskeletal: Negative for arthritis.   Gastrointestinal: Negative for abdominal pain.   Neurological: Negative for focal weakness.   Psychiatric/Behavioral: Negative for altered mental status.        Objective:    Physical Exam  Vitals and nursing note reviewed.   Constitutional:       General: He is not in acute distress.     Appearance: He is well-developed.   HENT:      Head: Normocephalic and atraumatic.   Eyes:      General: No scleral icterus.     Pupils: Pupils are equal, round, and reactive to light.   Neck:       Thyroid: No thyromegaly.   Cardiovascular:      Rate and Rhythm: Rhythm irregular.      Pulses: Normal pulses.      Heart sounds: Normal heart sounds. No murmur heard.    No friction rub. No gallop.   Pulmonary:      Effort: Pulmonary effort is normal.      Breath sounds: Normal breath sounds.   Abdominal:      General: Bowel sounds are normal. There is no distension.      Palpations: Abdomen is soft.      Tenderness: There is no abdominal tenderness.   Musculoskeletal:      Cervical back: Neck supple.   Skin:     General: Skin is warm and dry.      Findings: No rash.   Neurological:      Mental Status: He is alert and oriented to person, place, and time.   Psychiatric:         Behavior: Behavior normal.           Assessment:       1. Supraventricular tachycardia    2. Atrial fibrillation, unspecified type    3. H/O kidney transplant    4. Mixed hyperlipidemia         Plan:       In summary, Ibrahima Phelps is a 66M with a history of PAF. His [id number] Score is 3 (age, HTN, CAD) so he should continue eliquis at current dosing.    At this time antiarrhythmic options are limited in light of ongoing tacrolimus/MMF use as well as baseline CRI with recent Cr of 2.3 (CrCl 40). Plan is to start flecainide 100 mg bid, and perform DCCV 3 days later. Risks/benefits discussed and he has agreed to proceed.    Thank you for allowing me to participate in the care of this patient. Please do not hesitate to call me with any questions or concerns.

## 2022-03-21 ENCOUNTER — HOSPITAL ENCOUNTER (OUTPATIENT)
Dept: CARDIOLOGY | Facility: CLINIC | Age: 67
Discharge: HOME | End: 2022-03-21
Payer: MEDICARE

## 2022-03-21 ENCOUNTER — HOSPITAL ENCOUNTER (OUTPATIENT)
Dept: CARDIOLOGY | Facility: HOSPITAL | Age: 67
Discharge: HOME | End: 2022-03-21
Attending: INTERNAL MEDICINE
Payer: MEDICARE

## 2022-03-21 ENCOUNTER — OFFICE VISIT (OUTPATIENT)
Dept: ELECTROPHYSIOLOGY | Facility: CLINIC | Age: 67
End: 2022-03-21
Payer: MEDICARE

## 2022-03-21 VITALS
DIASTOLIC BLOOD PRESSURE: 82 MMHG | HEART RATE: 104 BPM | DIASTOLIC BLOOD PRESSURE: 90 MMHG | BODY MASS INDEX: 31.41 KG/M2 | HEIGHT: 73 IN | SYSTOLIC BLOOD PRESSURE: 130 MMHG | BODY MASS INDEX: 31.12 KG/M2 | WEIGHT: 234.81 LBS | WEIGHT: 237 LBS | HEART RATE: 94 BPM | SYSTOLIC BLOOD PRESSURE: 128 MMHG | HEIGHT: 73 IN

## 2022-03-21 DIAGNOSIS — I48.0 PAROXYSMAL ATRIAL FIBRILLATION: ICD-10-CM

## 2022-03-21 DIAGNOSIS — I50.32 CHRONIC DIASTOLIC HEART FAILURE: ICD-10-CM

## 2022-03-21 DIAGNOSIS — I48.91 ATRIAL FIBRILLATION, UNSPECIFIED TYPE: Primary | ICD-10-CM

## 2022-03-21 DIAGNOSIS — Z94.0 H/O KIDNEY TRANSPLANT: ICD-10-CM

## 2022-03-21 DIAGNOSIS — I49.8 OTHER SPECIFIED CARDIAC ARRHYTHMIAS: ICD-10-CM

## 2022-03-21 DIAGNOSIS — I47.10 SUPRAVENTRICULAR TACHYCARDIA: Primary | ICD-10-CM

## 2022-03-21 DIAGNOSIS — I48.20 CHRONIC ATRIAL FIBRILLATION, UNSPECIFIED: ICD-10-CM

## 2022-03-21 DIAGNOSIS — Z01.818 PRE-OP TESTING: ICD-10-CM

## 2022-03-21 DIAGNOSIS — I50.32 CHRONIC HEART FAILURE WITH PRESERVED EJECTION FRACTION: ICD-10-CM

## 2022-03-21 DIAGNOSIS — I65.23 BILATERAL CAROTID ARTERY STENOSIS: ICD-10-CM

## 2022-03-21 DIAGNOSIS — I48.91 ATRIAL FIBRILLATION, UNSPECIFIED TYPE: ICD-10-CM

## 2022-03-21 DIAGNOSIS — E78.2 MIXED HYPERLIPIDEMIA: ICD-10-CM

## 2022-03-21 LAB
ASCENDING AORTA: 3.15 CM
AV INDEX (PROSTH): 0.47
AV MEAN GRADIENT: 13 MMHG
AV PEAK GRADIENT: 27 MMHG
AV REGURGITATION PRESSURE HALF TIME: 675 MS
AV VALVE AREA: 1.84 CM2
AV VELOCITY RATIO: 0.41
BSA FOR ECHO PROCEDURE: 2.35 M2
CV ECHO LV RWT: 0.47 CM
DOP CALC AO PEAK VEL: 2.6 M/S
DOP CALC AO VTI: 48.57 CM
DOP CALC LVOT AREA: 3.9 CM2
DOP CALC LVOT DIAMETER: 2.22 CM
DOP CALC LVOT PEAK VEL: 1.06 M/S
DOP CALC LVOT STROKE VOLUME: 89.21 CM3
DOP CALCLVOT PEAK VEL VTI: 23.06 CM
E WAVE DECELERATION TIME: 162.11 MSEC
E/A RATIO: 4.36
E/E' RATIO: 12 M/S
ECHO LV POSTERIOR WALL: 1.07 CM (ref 0.6–1.1)
EJECTION FRACTION: 55 %
FRACTIONAL SHORTENING: 21 % (ref 28–44)
INTERVENTRICULAR SEPTUM: 1.3 CM (ref 0.6–1.1)
LA MAJOR: 5.77 CM
LA MINOR: 5.7 CM
LA WIDTH: 4.79 CM
LEFT ATRIUM SIZE: 4.14 CM
LEFT ATRIUM VOLUME INDEX MOD: 36.3 ML/M2
LEFT ATRIUM VOLUME INDEX: 42 ML/M2
LEFT ATRIUM VOLUME MOD: 83.52 CM3
LEFT ATRIUM VOLUME: 96.67 CM3
LEFT CBA DIAS: 8 CM/S
LEFT CBA SYS: 43 CM/S
LEFT CCA DIST DIAS: 10 CM/S
LEFT CCA DIST SYS: 52 CM/S
LEFT CCA MID DIAS: 14 CM/S
LEFT CCA MID SYS: 47 CM/S
LEFT CCA PROX DIAS: 10 CM/S
LEFT CCA PROX SYS: 62 CM/S
LEFT ECA DIAS: 10 CM/S
LEFT ECA SYS: 127 CM/S
LEFT ICA DIST DIAS: 18 CM/S
LEFT ICA DIST SYS: 62 CM/S
LEFT ICA MID DIAS: 15 CM/S
LEFT ICA MID SYS: 56 CM/S
LEFT ICA PROX DIAS: 25 CM/S
LEFT ICA PROX SYS: 84 CM/S
LEFT INTERNAL DIMENSION IN SYSTOLE: 3.64 CM (ref 2.1–4)
LEFT VENTRICLE DIASTOLIC VOLUME INDEX: 46.34 ML/M2
LEFT VENTRICLE DIASTOLIC VOLUME: 106.59 ML
LEFT VENTRICLE MASS INDEX: 88 G/M2
LEFT VENTRICLE SYSTOLIC VOLUME INDEX: 24.3 ML/M2
LEFT VENTRICLE SYSTOLIC VOLUME: 55.9 ML
LEFT VENTRICULAR INTERNAL DIMENSION IN DIASTOLE: 4.6 CM (ref 3.5–6)
LEFT VENTRICULAR MASS: 201.34 G
LEFT VERTEBRAL DIAS: 7 CM/S
LEFT VERTEBRAL SYS: 33 CM/S
LV LATERAL E/E' RATIO: 9.6 M/S
LV SEPTAL E/E' RATIO: 16 M/S
MV PEAK A VEL: 0.22 M/S
MV PEAK E VEL: 0.96 M/S
MV STENOSIS PRESSURE HALF TIME: 47.01 MS
MV VALVE AREA P 1/2 METHOD: 4.68 CM2
OHS CV CAROTID RIGHT ICA EDV HIGHEST: 26
OHS CV CAROTID ULTRASOUND LEFT ICA/CCA RATIO: 1.62
OHS CV CAROTID ULTRASOUND RIGHT ICA/CCA RATIO: 0.95
OHS CV PV CAROTID LEFT HIGHEST CCA: 62
OHS CV PV CAROTID LEFT HIGHEST ICA: 84
OHS CV PV CAROTID RIGHT HIGHEST CCA: 85
OHS CV PV CAROTID RIGHT HIGHEST ICA: 81
OHS CV US CAROTID LEFT HIGHEST EDV: 25
PISA TR MAX VEL: 2.35 M/S
PULM VEIN S/D RATIO: 0.52
PV PEAK D VEL: 0.52 M/S
PV PEAK S VEL: 0.27 M/S
RA MAJOR: 5.77 CM
RA PRESSURE: 3 MMHG
RA WIDTH: 4.5 CM
RIGHT ARM DIASTOLIC BLOOD PRESSURE: 82 MMHG
RIGHT ARM SYSTOLIC BLOOD PRESSURE: 130 MMHG
RIGHT CBA DIAS: 9 CM/S
RIGHT CBA SYS: 75 CM/S
RIGHT CCA DIST DIAS: 14 CM/S
RIGHT CCA DIST SYS: 85 CM/S
RIGHT CCA MID DIAS: 8 CM/S
RIGHT CCA MID SYS: 43 CM/S
RIGHT CCA PROX DIAS: 8 CM/S
RIGHT CCA PROX SYS: 65 CM/S
RIGHT ECA DIAS: 6 CM/S
RIGHT ECA SYS: 180 CM/S
RIGHT ICA DIST DIAS: 26 CM/S
RIGHT ICA DIST SYS: 81 CM/S
RIGHT ICA MID DIAS: 19 CM/S
RIGHT ICA MID SYS: 59 CM/S
RIGHT ICA PROX DIAS: 12 CM/S
RIGHT ICA PROX SYS: 50 CM/S
RIGHT VENTRICULAR END-DIASTOLIC DIMENSION: 4 CM
RIGHT VERTEBRAL DIAS: 11 CM/S
RIGHT VERTEBRAL SYS: 35 CM/S
RV TISSUE DOPPLER FREE WALL SYSTOLIC VELOCITY 1 (APICAL 4 CHAMBER VIEW): 9.84 CM/S
SINUS: 3.35 CM
STJ: 2.9 CM
TDI LATERAL: 0.1 M/S
TDI SEPTAL: 0.06 M/S
TDI: 0.08 M/S
TR MAX PG: 22 MMHG
TRICUSPID ANNULAR PLANE SYSTOLIC EXCURSION: 1.49 CM
TV REST PULMONARY ARTERY PRESSURE: 25 MMHG

## 2022-03-21 PROCEDURE — 3066F NEPHROPATHY DOC TX: CPT | Mod: CPTII,S$GLB,, | Performed by: INTERNAL MEDICINE

## 2022-03-21 PROCEDURE — 3008F BODY MASS INDEX DOCD: CPT | Mod: CPTII,S$GLB,, | Performed by: INTERNAL MEDICINE

## 2022-03-21 PROCEDURE — 93005 ELECTROCARDIOGRAM TRACING: CPT | Mod: S$GLB,,, | Performed by: INTERNAL MEDICINE

## 2022-03-21 PROCEDURE — 3080F DIAST BP >= 90 MM HG: CPT | Mod: CPTII,S$GLB,, | Performed by: INTERNAL MEDICINE

## 2022-03-21 PROCEDURE — 3044F PR MOST RECENT HEMOGLOBIN A1C LEVEL <7.0%: ICD-10-PCS | Mod: CPTII,S$GLB,, | Performed by: INTERNAL MEDICINE

## 2022-03-21 PROCEDURE — 93010 ELECTROCARDIOGRAM REPORT: CPT | Mod: S$GLB,,, | Performed by: INTERNAL MEDICINE

## 2022-03-21 PROCEDURE — 3044F HG A1C LEVEL LT 7.0%: CPT | Mod: CPTII,S$GLB,, | Performed by: INTERNAL MEDICINE

## 2022-03-21 PROCEDURE — 3080F PR MOST RECENT DIASTOLIC BLOOD PRESSURE >= 90 MM HG: ICD-10-PCS | Mod: CPTII,S$GLB,, | Performed by: INTERNAL MEDICINE

## 2022-03-21 PROCEDURE — 93880 EXTRACRANIAL BILAT STUDY: CPT | Mod: 26,,, | Performed by: INTERNAL MEDICINE

## 2022-03-21 PROCEDURE — 3008F PR BODY MASS INDEX (BMI) DOCUMENTED: ICD-10-PCS | Mod: CPTII,S$GLB,, | Performed by: INTERNAL MEDICINE

## 2022-03-21 PROCEDURE — 1159F MED LIST DOCD IN RCRD: CPT | Mod: CPTII,S$GLB,, | Performed by: INTERNAL MEDICINE

## 2022-03-21 PROCEDURE — 99999 PR PBB SHADOW E&M-EST. PATIENT-LVL IV: ICD-10-PCS | Mod: PBBFAC,,, | Performed by: INTERNAL MEDICINE

## 2022-03-21 PROCEDURE — 1159F PR MEDICATION LIST DOCUMENTED IN MEDICAL RECORD: ICD-10-PCS | Mod: CPTII,S$GLB,, | Performed by: INTERNAL MEDICINE

## 2022-03-21 PROCEDURE — 3074F PR MOST RECENT SYSTOLIC BLOOD PRESSURE < 130 MM HG: ICD-10-PCS | Mod: CPTII,S$GLB,, | Performed by: INTERNAL MEDICINE

## 2022-03-21 PROCEDURE — 1126F PR PAIN SEVERITY QUANTIFIED, NO PAIN PRESENT: ICD-10-PCS | Mod: CPTII,S$GLB,, | Performed by: INTERNAL MEDICINE

## 2022-03-21 PROCEDURE — 93306 TTE W/DOPPLER COMPLETE: CPT

## 2022-03-21 PROCEDURE — 1126F AMNT PAIN NOTED NONE PRSNT: CPT | Mod: CPTII,S$GLB,, | Performed by: INTERNAL MEDICINE

## 2022-03-21 PROCEDURE — 93306 TTE W/DOPPLER COMPLETE: CPT | Mod: 26,,, | Performed by: INTERNAL MEDICINE

## 2022-03-21 PROCEDURE — 93880 EXTRACRANIAL BILAT STUDY: CPT

## 2022-03-21 PROCEDURE — 1101F PT FALLS ASSESS-DOCD LE1/YR: CPT | Mod: CPTII,S$GLB,, | Performed by: INTERNAL MEDICINE

## 2022-03-21 PROCEDURE — 99205 PR OFFICE/OUTPT VISIT, NEW, LEVL V, 60-74 MIN: ICD-10-PCS | Mod: S$GLB,,, | Performed by: INTERNAL MEDICINE

## 2022-03-21 PROCEDURE — 3066F PR DOCUMENTATION OF TREATMENT FOR NEPHROPATHY: ICD-10-PCS | Mod: CPTII,S$GLB,, | Performed by: INTERNAL MEDICINE

## 2022-03-21 PROCEDURE — 99999 PR PBB SHADOW E&M-EST. PATIENT-LVL IV: CPT | Mod: PBBFAC,,, | Performed by: INTERNAL MEDICINE

## 2022-03-21 PROCEDURE — 93306 ECHO (CUPID ONLY): ICD-10-PCS | Mod: 26,,, | Performed by: INTERNAL MEDICINE

## 2022-03-21 PROCEDURE — 1101F PR PT FALLS ASSESS DOC 0-1 FALLS W/OUT INJ PAST YR: ICD-10-PCS | Mod: CPTII,S$GLB,, | Performed by: INTERNAL MEDICINE

## 2022-03-21 PROCEDURE — 99205 OFFICE O/P NEW HI 60 MIN: CPT | Mod: S$GLB,,, | Performed by: INTERNAL MEDICINE

## 2022-03-21 PROCEDURE — 93005 RHYTHM STRIP: ICD-10-PCS | Mod: S$GLB,,, | Performed by: INTERNAL MEDICINE

## 2022-03-21 PROCEDURE — 93010 RHYTHM STRIP: ICD-10-PCS | Mod: S$GLB,,, | Performed by: INTERNAL MEDICINE

## 2022-03-21 PROCEDURE — 3288F PR FALLS RISK ASSESSMENT DOCUMENTED: ICD-10-PCS | Mod: CPTII,S$GLB,, | Performed by: INTERNAL MEDICINE

## 2022-03-21 PROCEDURE — 93880 CV US DOPPLER CAROTID (CUPID ONLY): ICD-10-PCS | Mod: 26,,, | Performed by: INTERNAL MEDICINE

## 2022-03-21 PROCEDURE — 3074F SYST BP LT 130 MM HG: CPT | Mod: CPTII,S$GLB,, | Performed by: INTERNAL MEDICINE

## 2022-03-21 PROCEDURE — 3288F FALL RISK ASSESSMENT DOCD: CPT | Mod: CPTII,S$GLB,, | Performed by: INTERNAL MEDICINE

## 2022-03-21 RX ORDER — FLECAINIDE ACETATE 100 MG/1
100 TABLET ORAL EVERY 12 HOURS
Qty: 60 TABLET | Refills: 11 | Status: SHIPPED | OUTPATIENT
Start: 2022-03-21 | End: 2022-06-13

## 2022-03-21 RX ORDER — FLECAINIDE ACETATE 100 MG/1
100 TABLET ORAL EVERY 12 HOURS
Qty: 60 TABLET | Refills: 11 | Status: ON HOLD | OUTPATIENT
Start: 2022-03-21 | End: 2022-03-28 | Stop reason: HOSPADM

## 2022-03-22 ENCOUNTER — PATIENT MESSAGE (OUTPATIENT)
Dept: ELECTROPHYSIOLOGY | Facility: CLINIC | Age: 67
End: 2022-03-22
Payer: MEDICARE

## 2022-03-22 DIAGNOSIS — I48.91 ATRIAL FIBRILLATION, UNSPECIFIED TYPE: Primary | ICD-10-CM

## 2022-03-25 ENCOUNTER — TELEPHONE (OUTPATIENT)
Dept: ELECTROPHYSIOLOGY | Facility: CLINIC | Age: 67
End: 2022-03-25
Payer: MEDICARE

## 2022-03-25 NOTE — TELEPHONE ENCOUNTER
Attempted to reach pt , no answer. LVM CONFIRMing procedure arrival time of 3/28/2022 at 10:00am    Reiterated instructions including:  -Directions to check in desk  -NPO after midnight night prior to procedure  -Advised Flecainide needed to be started today.  -Pre-procedure LABS completed and reviewed. Baseline creatinine at 2.5  -COVID vaccines documented.  -Reviewed current visitor policy    Left number for pt to return call to with any questions.    Patient verbalizes understanding of above and appreciates call.

## 2022-03-28 ENCOUNTER — ANESTHESIA EVENT (OUTPATIENT)
Dept: MEDSURG UNIT | Facility: HOSPITAL | Age: 67
End: 2022-03-28
Payer: MEDICARE

## 2022-03-28 ENCOUNTER — ANESTHESIA (OUTPATIENT)
Dept: MEDSURG UNIT | Facility: HOSPITAL | Age: 67
End: 2022-03-28
Payer: MEDICARE

## 2022-03-28 ENCOUNTER — HOSPITAL ENCOUNTER (OUTPATIENT)
Facility: HOSPITAL | Age: 67
Discharge: HOME | End: 2022-03-28
Attending: INTERNAL MEDICINE | Admitting: INTERNAL MEDICINE
Payer: MEDICARE

## 2022-03-28 VITALS
HEART RATE: 56 BPM | DIASTOLIC BLOOD PRESSURE: 75 MMHG | SYSTOLIC BLOOD PRESSURE: 137 MMHG | OXYGEN SATURATION: 100 % | BODY MASS INDEX: 31.14 KG/M2 | TEMPERATURE: 98 F | HEIGHT: 73 IN | WEIGHT: 235 LBS | RESPIRATION RATE: 24 BRPM

## 2022-03-28 DIAGNOSIS — I48.91 ATRIAL FIBRILLATION: ICD-10-CM

## 2022-03-28 DIAGNOSIS — I48.0 PAF (PAROXYSMAL ATRIAL FIBRILLATION): ICD-10-CM

## 2022-03-28 PROCEDURE — D9220A PRA ANESTHESIA: Mod: CRNA,,, | Performed by: NURSE ANESTHETIST, CERTIFIED REGISTERED

## 2022-03-28 PROCEDURE — 25000003 PHARM REV CODE 250: Performed by: NURSE ANESTHETIST, CERTIFIED REGISTERED

## 2022-03-28 PROCEDURE — 92960 CARDIOVERSION ELECTRIC EXT: CPT | Performed by: INTERNAL MEDICINE

## 2022-03-28 PROCEDURE — 37000008 HC ANESTHESIA 1ST 15 MINUTES: Performed by: INTERNAL MEDICINE

## 2022-03-28 PROCEDURE — 92960 CARDIOVERSION ELECTRIC EXT: CPT | Mod: ,,, | Performed by: INTERNAL MEDICINE

## 2022-03-28 PROCEDURE — 93005 ELECTROCARDIOGRAM TRACING: CPT

## 2022-03-28 PROCEDURE — D9220A PRA ANESTHESIA: ICD-10-PCS | Mod: ANES,,, | Performed by: ANESTHESIOLOGY

## 2022-03-28 PROCEDURE — 92960 PR CARDIOVERSION, ELECTIVE;EXTERN: ICD-10-PCS | Mod: ,,, | Performed by: INTERNAL MEDICINE

## 2022-03-28 PROCEDURE — 37000009 HC ANESTHESIA EA ADD 15 MINS: Performed by: INTERNAL MEDICINE

## 2022-03-28 PROCEDURE — 93005 ELECTROCARDIOGRAM TRACING: CPT | Mod: 59

## 2022-03-28 PROCEDURE — 93010 ELECTROCARDIOGRAM REPORT: CPT | Mod: ,,, | Performed by: INTERNAL MEDICINE

## 2022-03-28 PROCEDURE — 93010 EKG 12-LEAD: ICD-10-PCS | Mod: ,,, | Performed by: INTERNAL MEDICINE

## 2022-03-28 PROCEDURE — D9220A PRA ANESTHESIA: ICD-10-PCS | Mod: CRNA,,, | Performed by: NURSE ANESTHETIST, CERTIFIED REGISTERED

## 2022-03-28 PROCEDURE — 25000003 PHARM REV CODE 250: Performed by: NURSE PRACTITIONER

## 2022-03-28 PROCEDURE — 63600175 PHARM REV CODE 636 W HCPCS: Performed by: NURSE ANESTHETIST, CERTIFIED REGISTERED

## 2022-03-28 PROCEDURE — D9220A PRA ANESTHESIA: Mod: ANES,,, | Performed by: ANESTHESIOLOGY

## 2022-03-28 PROCEDURE — 27000221 HC OXYGEN, UP TO 24 HOURS

## 2022-03-28 RX ORDER — FENTANYL CITRATE 50 UG/ML
25 INJECTION, SOLUTION INTRAMUSCULAR; INTRAVENOUS EVERY 5 MIN PRN
Status: DISCONTINUED | OUTPATIENT
Start: 2022-03-28 | End: 2022-03-28 | Stop reason: HOSPADM

## 2022-03-28 RX ORDER — LIDOCAINE HYDROCHLORIDE 20 MG/ML
INJECTION INTRAVENOUS
Status: DISCONTINUED | OUTPATIENT
Start: 2022-03-28 | End: 2022-03-28

## 2022-03-28 RX ORDER — FENTANYL CITRATE 50 UG/ML
25 INJECTION, SOLUTION INTRAMUSCULAR; INTRAVENOUS EVERY 5 MIN PRN
Status: CANCELLED | OUTPATIENT
Start: 2022-03-28

## 2022-03-28 RX ORDER — HYDROMORPHONE HYDROCHLORIDE 1 MG/ML
0.2 INJECTION, SOLUTION INTRAMUSCULAR; INTRAVENOUS; SUBCUTANEOUS EVERY 5 MIN PRN
Status: DISCONTINUED | OUTPATIENT
Start: 2022-03-28 | End: 2022-03-28 | Stop reason: HOSPADM

## 2022-03-28 RX ORDER — SODIUM CHLORIDE 0.9 % (FLUSH) 0.9 %
3 SYRINGE (ML) INJECTION
Status: CANCELLED | OUTPATIENT
Start: 2022-03-28

## 2022-03-28 RX ORDER — PROCHLORPERAZINE EDISYLATE 5 MG/ML
5 INJECTION INTRAMUSCULAR; INTRAVENOUS EVERY 30 MIN PRN
Status: CANCELLED | OUTPATIENT
Start: 2022-03-28

## 2022-03-28 RX ORDER — HYDROMORPHONE HYDROCHLORIDE 1 MG/ML
0.2 INJECTION, SOLUTION INTRAMUSCULAR; INTRAVENOUS; SUBCUTANEOUS EVERY 5 MIN PRN
Status: CANCELLED | OUTPATIENT
Start: 2022-03-28

## 2022-03-28 RX ORDER — HALOPERIDOL 5 MG/ML
0.5 INJECTION INTRAMUSCULAR EVERY 10 MIN PRN
Status: CANCELLED | OUTPATIENT
Start: 2022-03-28

## 2022-03-28 RX ORDER — PHENYLEPHRINE HYDROCHLORIDE 10 MG/ML
INJECTION INTRAVENOUS
Status: DISCONTINUED | OUTPATIENT
Start: 2022-03-28 | End: 2022-03-28

## 2022-03-28 RX ORDER — PROPOFOL 10 MG/ML
VIAL (ML) INTRAVENOUS
Status: DISCONTINUED | OUTPATIENT
Start: 2022-03-28 | End: 2022-03-28

## 2022-03-28 RX ORDER — DIPHENHYDRAMINE HYDROCHLORIDE 50 MG/ML
25 INJECTION INTRAMUSCULAR; INTRAVENOUS EVERY 6 HOURS PRN
Status: CANCELLED | OUTPATIENT
Start: 2022-03-28

## 2022-03-28 RX ORDER — ONDANSETRON 2 MG/ML
4 INJECTION INTRAMUSCULAR; INTRAVENOUS ONCE AS NEEDED
Status: DISCONTINUED | OUTPATIENT
Start: 2022-03-28 | End: 2022-03-28 | Stop reason: HOSPADM

## 2022-03-28 RX ADMIN — LIDOCAINE HYDROCHLORIDE 50 MG: 20 INJECTION, SOLUTION INTRAVENOUS at 12:03

## 2022-03-28 RX ADMIN — SODIUM CHLORIDE: 0.9 INJECTION, SOLUTION INTRAVENOUS at 12:03

## 2022-03-28 RX ADMIN — PROPOFOL 90 MG: 10 INJECTION, EMULSION INTRAVENOUS at 12:03

## 2022-03-28 RX ADMIN — PHENYLEPHRINE HYDROCHLORIDE 100 MCG: 10 INJECTION, SOLUTION INTRAMUSCULAR; INTRAVENOUS; SUBCUTANEOUS at 12:03

## 2022-03-28 NOTE — ASSESSMENT & PLAN NOTE
- Anticoagulated on eliquis. Denies any missed doses. Defer RODRIGO.  - Proceed with DCCV only.     - Anesthesia for sedation prior to procedure.   - Extensive discussion with patient regarding risks and benefits of DCCV, and patient would like to proceed.  The patient voices understanding and all questions have been answered. No further questions/concerns voiced at this time.

## 2022-03-28 NOTE — HOSPITAL COURSE
Mr. Phelps underwent DCCV 200 J x 1 shock, converting from AF to sinus rhythm. He tolerated the procedure without any acute complications.  Post-DCCV ECG revealing of sinus bradycardia. Plan to continue PTA medications including flecainide and eliquis.Instructed to return in 1 week for ECG and in 4 weeks for clinic follow up.   Mr. Phelps was assessed at bedside prior to discharge. He reported feeling well and denied chest discomfort, shortness of breath, palpitations, lightheadedness, or any other acute symptoms. Discharge instructions were discussed and all questions were answered. Mr. Phelps was discharged home in stable condition.

## 2022-03-28 NOTE — PLAN OF CARE
Patient arrived to room. PIV placed. Admit assessment completed. Plan of care discussed with patient. Will monitor. Call light within reach, instructed in use.

## 2022-03-28 NOTE — HPI
Ibrahima Phelps presents to short stay cardiac unit on 03/28/2022 for planned cardioversion with Dr. Blunt.     Mr. Phelps is a 66 y.o. male with HLD, HTN, CAD status-post PCI in 2006, and kidney transplant in 2005 (most recent Cr 2.3 in 1/2022), whose history of AF dates back to 4/2015 when he presented to [facility] with sudden onset palpitations and was found to be in AF with RVR. He was cardioverted and discharged on anticoagulation. He had recurrent AF in 5/2017 which prompted an ED visit--he spontaneously reverted to sinus rhythm. He was recently seen in cardiology clinic where he was found to be back in AF at 100 bpm.      Mr. Phelps is treated with toprol xl 25 mg daily, and eliquis 5 mg bid. A cardiac SPECT in 6/2020 was negative for ischemia. An echo from 3/2022 showed an EF of 55% and moderate LAE. Mr. Phelps reports worsening DAUGHERTY since AF recurred. At visit with Dr. Blunt he remained in AF with RVR. Given ongoing treatment with tacrolimus/MMF, as well as baseline renal impairment with Cr 2.3 (CrCl 40), AAD therapy options are limited. Recommend starting flecainide 100 mg BID and then perform DCCV 3 days later. Mr. Phelps is anticoagulated on eliquis and he denies any missed doses.     EKG:  My independent visualization of most recent EKG is AF at 77bpm

## 2022-03-28 NOTE — TRANSFER OF CARE
"Anesthesia Transfer of Care Note    Patient: Ibrahima Phelps    Procedure(s) Performed: Procedure(s) (LRB):  CARDIOVERSION (N/A)    Patient location: Other: EP PACU    Anesthesia Type: general    Transport from OR: Transported from OR on 6-10 L/min O2 by face mask with adequate spontaneous ventilation    Post pain: adequate analgesia    Post assessment: no apparent anesthetic complications and tolerated procedure well    Post vital signs: stable    Level of consciousness: lethargic and responds to stimulation    Nausea/Vomiting: no nausea/vomiting    Complications: none    Transfer of care protocol was followed      Last vitals:   Visit Vitals  BP (!) 179/81 (BP Location: Right arm, Patient Position: Lying)   Pulse 78   Temp 36.7 °C (98.1 °F) (Temporal)   Resp 16   Ht 6' 1" (1.854 m)   Wt 106.6 kg (235 lb)   SpO2 96%   BMI 31.00 kg/m²     "

## 2022-03-28 NOTE — PROGRESS NOTES
Pt discharged to home. Discharge instructions given to pt and spouse who voiced understanding.  IV removed catheter intact. Denies pain or discomfort. Respirations even and unlabored. No distress noted. Pt stable.  Left unit in wheelchair with staff at side.

## 2022-03-28 NOTE — SUBJECTIVE & OBJECTIVE
"Past Medical History:   Diagnosis Date    (HFpEF) heart failure with preserved ejection fraction 9/25/2017    Atrial fibrillation     CAD (coronary artery disease)     CHF (congestive heart failure)     Chronic diastolic heart failure 4/8/2020    CKD (chronic kidney disease) stage 3, GFR 30-59 ml/min     Dr. Latif    Diverticulosis     Fever blister     Heart attack 2006    Hyperlipidemia     Hypertension     Immunodeficiency due to treatment with immunosuppressive medication     Keloid cicatrix     Metabolic bone disease     Pericarditis     S/P kidney transplant     Supraventricular tachycardia 06/2019    Thrombocytopenia     Thyroid disease     Unspecified disorder of kidney and ureter     Stage IIICKD       Past Surgical History:   Procedure Laterality Date    CARDIOVERSION  04/30/15    CHOLECYSTECTOMY      COLONOSCOPY  April 20, 2011    Diverticulosis, repeat recommended in 3 yrs., repeat colonoscopy 2014 revealed 2 polyps.  He should return in 5 years.    COLONOSCOPY N/A 3/13/2020    Procedure: COLONOSCOPY;  Surgeon: Chon Casper MD;  [facility]  Service: Endoscopy;  Laterality: N/A;  ok to hold coumadin x5days-see telephone encounter 2/4/20-tb    COLONOSCOPY N/A 2/4/2021    Procedure: COLONOSCOPY;  Surgeon: Chon Casper MD;  Location: [facility] (4TH FLR);  Service: Endoscopy;  Laterality: N/A;  Eliquis - per Dr. GAVIN chua to hold x 2 days and "restarted asap"- ERW  last prep "poor", [id number] ordered/ Pt requests Dr. Casper  prep ins. mailed - COVID screening on 2/1/21 PCW- ERW    COLONOSCOPY N/A 3/3/2021    Procedure: COLONOSCOPY;  Surgeon: Chon Casper MD;  [facility]  Service: Endoscopy;  Laterality: N/A;  Patient with his second poor bowel pre and has poor prep today.  If patient not intersted or can't get colonoscopy tomorrow will need constipation bowel prep and will need to restart his Eliquis today.Thanks,Chon Blunt-ok to hold " Eliquis 2 days prior      COVID     CORONARY ANGIOPLASTY WITH STENT PLACEMENT  9/13/2006    KIDNEY TRANSPLANT  2005    PARATHYROIDECTOMY         Review of patient's allergies indicates:   Allergen Reactions    Ace inhibitors Swelling    Verapamil Other (See Comments)     Other reaction(s): Unknown    Povidone-iodine Itching       No current facility-administered medications on file prior to encounter.     Current Outpatient Medications on File Prior to Encounter   Medication Sig    albuterol (ACCUNEB) 1.25 mg/3 mL Nebu Take 3 mLs (1.25 mg total) by nebulization every 6 (six) hours as needed. Rescue    ammonium lactate 12 % Crea Apply 1 g topically once daily.    apixaban (ELIQUIS) 5 mg Tab Take 1 tablet (5 mg total) by mouth 2 (two) times daily.    aspirin (ECOTRIN) 81 MG EC tablet Take 1 tablet by mouth Daily.    atorvastatin (LIPITOR) 20 MG tablet Take 1 tablet (20 mg total) by mouth once daily.    calcium carbonate (OS-TRISH) 500 mg calcium (1,250 mg) tablet TAKE 2 TABLETS BY MOUTH TWICE A DAY    doxazosin (CARDURA) 4 MG tablet Take 1 tablet (4 mg total) by mouth every 12 (twelve) hours.    famotidine (PEPCID) 20 MG tablet Take 1 tablet (20 mg total) by mouth 2 (two) times daily.    fexofenadine (ALLEGRA) 60 MG tablet Take 1 tablet by mouth Twice daily as needed.    flecainide (TAMBOCOR) 100 MG Tab Take 1 tablet (100 mg total) by mouth every 12 (twelve) hours. Start 3 days before cardioversion    flecainide (TAMBOCOR) 100 MG Tab Take 1 tablet (100 mg total) by mouth every 12 (twelve) hours.    FLUAD QUAD 2021-22,65Y UP,,PF, 60 mcg (15 mcg x 4)/0.5 mL Syrg     fluticasone propionate (FLONASE) 50 mcg/actuation nasal spray 1 spray (50 mcg total) by Each Nostril route once daily.    furosemide (LASIX) 40 MG tablet Take 0.5 tablets (20 mg total) by mouth 2 (two) times daily.    gabapentin (NEURONTIN) 300 MG capsule TAKE 1 CAPSULE BY MOUTH TWICE A DAY    hydrALAZINE (APRESOLINE) 50 MG tablet Take 1 tablet (50 mg total)  by mouth 3 (three) times daily.    metoprolol succinate (TOPROL-XL) 25 MG 24 hr tablet Take 1 tablet (25 mg total) by mouth once daily.    multivitamin (THERAGRAN) per tablet Take 1 tablet by mouth Daily.    mycophenolate (CELLCEPT) 250 mg Cap TAKE 2 CAPSULES (500 MG TOTAL) BY MOUTH 2 (TWO) TIMES DAILY.    mycophenolate (CELLCEPT) 250 mg Cap Take 2 capsules (500 mg total) by mouth 2 (two) times daily.    nicotine (NICODERM CQ) 21 mg/24 hr Place 1 patch onto the skin once daily.    NIFEdipine (PROCARDIA-XL) 60 MG (OSM) 24 hr tablet Take 2 tablets (120 mg total) by mouth once daily. (Patient taking differently: Take 120 mg by mouth 2 (two) times a day.)    paricalcitoL (ZEMPLAR) 1 MCG capsule TAKE 1 TABLET BY MOUTH ON MONDAY, WEDNESDAY, & FRIDAY    potassium chloride (KLOR-CON 10) 10 MEQ TbSR Take 1 tablet (10 mEq total) by mouth once daily.    predniSONE (DELTASONE) 5 MG tablet TAKE 1 TABLET BY MOUTH EVERY DAY IN THE MORNING    prednisone 5 mg DsPk Take 5 mg by mouth once daily.    spironolactone (ALDACTONE) 25 MG tablet Take 1 tablet (25 mg total) by mouth once daily.    tacrolimus (PROGRAF) 1 MG Cap Two po in am and one po in pm    vitamin D 1000 units Tab Take 370 mg by mouth 2 (two) times daily. 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   Substance and Sexual Activity    Alcohol use: No    Drug use: No    Sexual activity: Yes     Partners: Female     Review of Systems   Constitutional: Positive for malaise/fatigue.   Cardiovascular:  Positive  for dyspnea on exertion. Negative for chest pain, irregular heartbeat, leg swelling, palpitations and syncope.   Hematologic/Lymphatic: Negative for bleeding problem.   Neurological:  Negative for light-headedness.   Objective:     Vital Signs (Most Recent):    Vital Signs (24h Range):             There is no height or weight on file to calculate BMI.            Physical Exam  Vitals reviewed.   Constitutional:       Appearance: He is well-developed.   Cardiovascular:      Rate and Rhythm: Normal rate. Rhythm irregularly irregular.      Pulses: Intact distal pulses.      Heart sounds: Normal heart sounds, S1 normal and S2 normal.   Pulmonary:      Effort: Pulmonary effort is normal.      Breath sounds: Normal breath sounds.   Musculoskeletal:         General: Normal range of motion.   Skin:     General: Skin is warm and dry.   Neurological:      Mental Status: He is alert and oriented to person, place, and time.

## 2022-03-28 NOTE — H&P
[facility] - Short Stay Cardiac Unit  Cardiac Electrophysiology  History and Physical     Admission Date: 3/28/2022  Code Status: Prior   Attending Provider: Migue Blunt MD   Principal Problem:<principal problem not specified>    Subjective:     Chief Complaint:  Persistent AF     HPI:  Ibrahima Phelps presents to short stay cardiac unit on 03/28/2022 for planned cardioversion with Dr. Blunt.     Mr. Phelps is a 66 y.o. male with HLD, HTN, CAD status-post PCI in 2006, and kidney transplant in 2005 (most recent Cr 2.3 in 1/2022), whose history of AF dates back to 4/2015 when he presented to [facility] with sudden onset palpitations and was found to be in AF with RVR. He was cardioverted and discharged on anticoagulation. He had recurrent AF in 5/2017 which prompted an ED visit--he spontaneously reverted to sinus rhythm. He was recently seen in cardiology clinic where he was found to be back in AF at 100 bpm.      Mr. Phelps is treated with toprol xl 25 mg daily, and eliquis 5 mg bid. A cardiac SPECT in 6/2020 was negative for ischemia. An echo from 3/2022 showed an EF of 55% and moderate LAE. Mr. Phelps reports worsening DAUGHERTY since AF recurred. At visit with Dr. Blunt he remained in AF with RVR. Given ongoing treatment with tacrolimus/MMF, as well as baseline renal impairment with Cr 2.3 (CrCl 40), AAD therapy options are limited. Recommend starting flecainide 100 mg BID and then perform DCCV 3 days later. Mr. Phelps is anticoagulated on eliquis and he denies any missed doses.     EKG:  My independent visualization of most recent EKG is AF at 77bpm          Past Medical History:   Diagnosis Date    (HFpEF) heart failure with preserved ejection fraction 9/25/2017    Atrial fibrillation     CAD (coronary artery disease)     CHF (congestive heart failure)     Chronic diastolic heart failure 4/8/2020    CKD (chronic kidney disease) stage 3, GFR 30-59 ml/min     Dr. Latif    Diverticulosis     Fever blister     Heart  "attack 2006    Hyperlipidemia     Hypertension     Immunodeficiency due to treatment with immunosuppressive medication     Keloid cicatrix     Metabolic bone disease     Pericarditis     S/P kidney transplant     Supraventricular tachycardia 06/2019    Thrombocytopenia     Thyroid disease     Unspecified disorder of kidney and ureter     Stage IIICKD       Past Surgical History:   Procedure Laterality Date    CARDIOVERSION  04/30/15    CHOLECYSTECTOMY      COLONOSCOPY  April 20, 2011    Diverticulosis, repeat recommended in 3 yrs., repeat colonoscopy 2014 revealed 2 polyps.  He should return in 5 years.    COLONOSCOPY N/A 3/13/2020    Procedure: COLONOSCOPY;  Surgeon: Chon Casper MD;  Location: [facility] (4TH FLR);  Service: Endoscopy;  Laterality: N/A;  ok to hold coumadin x5days-see telephone encounter 2/4/20-tb    COLONOSCOPY N/A 2/4/2021    Procedure: COLONOSCOPY;  Surgeon: Chon Casper MD;  Location: [facility] (4TH FLR);  Service: Endoscopy;  Laterality: N/A;  Eliquis - per Dr. GAVIN Blunt ok to hold x 2 days and "restarted asap"- ERW  last prep "poor", [id number] ordered/ Pt requests Dr. Casper  prep ins. mailed - COVID screening on 2/1/21 PCW- ERW    COLONOSCOPY N/A 3/3/2021    Procedure: COLONOSCOPY;  Surgeon: Chon Casper MD;  Location: [facility] (4TH FLR);  Service: Endoscopy;  Laterality: N/A;  Patient with his second poor bowel pre and has poor prep today.  If patient not intersted or can't get colonoscopy tomorrow will need constipation bowel prep and will need to restart his Eliquis today.Thanks,Chon Blunt-ok to hold Eliquis 2 days prior      COVID     CORONARY ANGIOPLASTY WITH STENT PLACEMENT  9/13/2006    KIDNEY TRANSPLANT  2005    PARATHYROIDECTOMY         Review of patient's allergies indicates:   Allergen Reactions    Ace inhibitors Swelling    Verapamil Other (See Comments)     Other reaction(s): Unknown    Povidone-iodine Itching       No current " facility-administered medications on file prior to encounter.     Current Outpatient Medications on File Prior to Encounter   Medication Sig    albuterol (ACCUNEB) 1.25 mg/3 mL Nebu Take 3 mLs (1.25 mg total) by nebulization every 6 (six) hours as needed. Rescue    ammonium lactate 12 % Crea Apply 1 g topically once daily.    apixaban (ELIQUIS) 5 mg Tab Take 1 tablet (5 mg total) by mouth 2 (two) times daily.    aspirin (ECOTRIN) 81 MG EC tablet Take 1 tablet by mouth Daily.    atorvastatin (LIPITOR) 20 MG tablet Take 1 tablet (20 mg total) by mouth once daily.    calcium carbonate (OS-TRISH) 500 mg calcium (1,250 mg) tablet TAKE 2 TABLETS BY MOUTH TWICE A DAY    doxazosin (CARDURA) 4 MG tablet Take 1 tablet (4 mg total) by mouth every 12 (twelve) hours.    famotidine (PEPCID) 20 MG tablet Take 1 tablet (20 mg total) by mouth 2 (two) times daily.    fexofenadine (ALLEGRA) 60 MG tablet Take 1 tablet by mouth Twice daily as needed.    flecainide (TAMBOCOR) 100 MG Tab Take 1 tablet (100 mg total) by mouth every 12 (twelve) hours. Start 3 days before cardioversion    flecainide (TAMBOCOR) 100 MG Tab Take 1 tablet (100 mg total) by mouth every 12 (twelve) hours.    FLUAD QUAD 2021-22,65Y UP,,PF, 60 mcg (15 mcg x 4)/0.5 mL Syrg     fluticasone propionate (FLONASE) 50 mcg/actuation nasal spray 1 spray (50 mcg total) by Each Nostril route once daily.    furosemide (LASIX) 40 MG tablet Take 0.5 tablets (20 mg total) by mouth 2 (two) times daily.    gabapentin (NEURONTIN) 300 MG capsule TAKE 1 CAPSULE BY MOUTH TWICE A DAY    hydrALAZINE (APRESOLINE) 50 MG tablet Take 1 tablet (50 mg total) by mouth 3 (three) times daily.    metoprolol succinate (TOPROL-XL) 25 MG 24 hr tablet Take 1 tablet (25 mg total) by mouth once daily.    multivitamin (THERAGRAN) per tablet Take 1 tablet by mouth Daily.    mycophenolate (CELLCEPT) 250 mg Cap TAKE 2 CAPSULES (500 MG TOTAL) BY MOUTH 2 (TWO) TIMES DAILY.    mycophenolate  (CELLCEPT) 250 mg Cap Take 2 capsules (500 mg total) by mouth 2 (two) times daily.    nicotine (NICODERM CQ) 21 mg/24 hr Place 1 patch onto the skin once daily.    NIFEdipine (PROCARDIA-XL) 60 MG (OSM) 24 hr tablet Take 2 tablets (120 mg total) by mouth once daily. (Patient taking differently: Take 120 mg by mouth 2 (two) times a day.)    paricalcitoL (ZEMPLAR) 1 MCG capsule TAKE 1 TABLET BY MOUTH ON MONDAY, WEDNESDAY, & FRIDAY    potassium chloride (KLOR-CON 10) 10 MEQ TbSR Take 1 tablet (10 mEq total) by mouth once daily.    predniSONE (DELTASONE) 5 MG tablet TAKE 1 TABLET BY MOUTH EVERY DAY IN THE MORNING    prednisone 5 mg DsPk Take 5 mg by mouth once daily.    spironolactone (ALDACTONE) 25 MG tablet Take 1 tablet (25 mg total) by mouth once daily.    tacrolimus (PROGRAF) 1 MG Cap Two po in am and one po in pm    vitamin D 1000 units Tab Take 370 mg by mouth 2 (two) times daily. 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   Substance and Sexual Activity    Alcohol use: No    Drug use: No    Sexual activity: Yes     Partners: Female     Review of Systems   Constitutional: Positive for malaise/fatigue.   Cardiovascular:  Positive for dyspnea on exertion. Negative for chest pain, irregular heartbeat, leg swelling, palpitations and syncope.   Hematologic/Lymphatic: Negative for bleeding problem.   Neurological:  Negative for light-headedness.   Objective:     Vital Signs (Most Recent):    Vital Signs (24h Range):             There is  no height or weight on file to calculate BMI.            Physical Exam  Vitals reviewed.   Constitutional:       Appearance: He is well-developed.   Cardiovascular:      Rate and Rhythm: Normal rate. Rhythm irregularly irregular.      Pulses: Intact distal pulses.      Heart sounds: Normal heart sounds, S1 normal and S2 normal.   Pulmonary:      Effort: Pulmonary effort is normal.      Breath sounds: Normal breath sounds.   Musculoskeletal:         General: Normal range of motion.   Skin:     General: Skin is warm and dry.   Neurological:      Mental Status: He is alert and oriented to person, place, and time.       Assessment and Plan:     Atrial fibrillation  - Anticoagulated on eliquis. Denies any missed doses. Defer RODRIGO.  - Proceed with DCCV only.     - Anesthesia for sedation prior to procedure.   - Extensive discussion with patient regarding risks and benefits of DCCV, and patient would like to proceed.  The patient voices understanding and all questions have been answered. No further questions/concerns voiced at this time.                Tere Dillon NP  Cardiac Electrophysiology  [facility] - Short Stay Cardiac Unit

## 2022-03-28 NOTE — ANESTHESIA POSTPROCEDURE EVALUATION
Anesthesia Post Evaluation    Patient: Ibrahima Phelps    Procedure(s) Performed: Procedure(s) (LRB):  CARDIOVERSION (N/A)    Final Anesthesia Type: general      Patient location during evaluation: PACU  Patient participation: Yes- Able to Participate  Level of consciousness: awake and alert  Post-procedure vital signs: reviewed and stable  Pain management: adequate  Airway patency: patent    PONV status at discharge: No PONV  Anesthetic complications: no      Cardiovascular status: hemodynamically stable  Respiratory status: unassisted  Follow-up not needed.          Vitals Value Taken Time   /75 03/28/22 1316   Temp 36.7 °C (98.1 °F) 03/28/22 1315   Pulse 64 03/28/22 1331   Resp 30 03/28/22 1330   SpO2 100 % 03/28/22 1331   Vitals shown include unvalidated device data.      No case tracking events are documented in the log.      Pain/Stephane Score: Stephane Score: 10 (3/28/2022  1:15 PM)

## 2022-03-28 NOTE — ANESTHESIA PREPROCEDURE EVALUATION
03/28/2022  Pre-operative evaluation for Procedure(s) (LRB):  CARDIOVERSION (N/A)    Ibrahima Phelps is a 66 y.o. male     Patient Active Problem List   Diagnosis    Immunodeficiency due to treatment with immunosuppressive medication    Metabolic bone disease    CAD (coronary artery disease)    Mixed hyperlipidemia    Diverticulosis    CKD (chronic kidney disease) stage 3, GFR 30-59 ml/min    Carotid artery bruit    Secondary renal hyperparathyroidism    Anemia due to stage 3 chronic kidney disease    Tobacco use    History of adenomatous polyp of colon    Calcific tendinitis of left shoulder    Left shoulder pain    Left rotator cuff tear    Obesity (BMI 30-39.9)    Impingement syndrome of left shoulder    DAUGHERTY (dyspnea on exertion)    Supraventricular tachycardia    Prediabetes    Long term (current) use of anticoagulants [V58.61]    H/O kidney transplant    Elevated troponin    History of pericarditis    Hypertensive heart and kidney disease    Atrial fibrillation    Hypokalemia    Other emphysema    Immunosuppressive management encounter following kidney transplant    Chronic diastolic heart failure    Multiple lung nodules on CT    Right elbow pain    Colon adenoma    Colon polyps    Carotid artery disease    Kidney replaced by transplant    Postural tremor    Polyneuropathy    Chronic kidney disease, stage 4 (severe)    Screening procedure       Review of patient's allergies indicates:   Allergen Reactions    Ace inhibitors Swelling    Verapamil Other (See Comments)     Other reaction(s): Unknown    Povidone-iodine Itching       No current facility-administered medications on file prior to encounter.     Current Outpatient Medications on File Prior to Encounter   Medication Sig Dispense Refill    apixaban (ELIQUIS) 5 mg Tab Take 1 tablet (5 mg total) by mouth 2  (two) times daily. 180 tablet 0    aspirin (ECOTRIN) 81 MG EC tablet Take 1 tablet by mouth Daily.      atorvastatin (LIPITOR) 20 MG tablet Take 1 tablet (20 mg total) by mouth once daily. 90 tablet 3    calcium carbonate (OS-TRISH) 500 mg calcium (1,250 mg) tablet TAKE 2 TABLETS BY MOUTH TWICE A  tablet 3    doxazosin (CARDURA) 4 MG tablet Take 1 tablet (4 mg total) by mouth every 12 (twelve) hours. 180 tablet 3    famotidine (PEPCID) 20 MG tablet Take 1 tablet (20 mg total) by mouth 2 (two) times daily. 180 tablet 3    fexofenadine (ALLEGRA) 60 MG tablet Take 1 tablet by mouth Twice daily as needed.      flecainide (TAMBOCOR) 100 MG Tab Take 1 tablet (100 mg total) by mouth every 12 (twelve) hours. 60 tablet 11    fluticasone propionate (FLONASE) 50 mcg/actuation nasal spray 1 spray (50 mcg total) by Each Nostril route once daily. 18.2 mL 0    furosemide (LASIX) 40 MG tablet Take 0.5 tablets (20 mg total) by mouth 2 (two) times daily. 180 tablet 3    gabapentin (NEURONTIN) 300 MG capsule TAKE 1 CAPSULE BY MOUTH TWICE A  capsule 2    hydrALAZINE (APRESOLINE) 50 MG tablet Take 1 tablet (50 mg total) by mouth 3 (three) times daily. 270 tablet 3    metoprolol succinate (TOPROL-XL) 25 MG 24 hr tablet Take 1 tablet (25 mg total) by mouth once daily. 30 tablet 11    multivitamin (THERAGRAN) per tablet Take 1 tablet by mouth Daily.      mycophenolate (CELLCEPT) 250 mg Cap TAKE 2 CAPSULES (500 MG TOTAL) BY MOUTH 2 (TWO) TIMES DAILY. 360 capsule 3    nicotine (NICODERM CQ) 21 mg/24 hr Place 1 patch onto the skin once daily. 90 patch 3    NIFEdipine (PROCARDIA-XL) 60 MG (OSM) 24 hr tablet Take 2 tablets (120 mg total) by mouth once daily. (Patient taking differently: Take 120 mg by mouth 2 (two) times a day.) 60 tablet 11    paricalcitoL (ZEMPLAR) 1 MCG capsule TAKE 1 TABLET BY MOUTH ON MONDAY, WEDNESDAY, & FRIDAY 36 capsule 3    potassium chloride (KLOR-CON 10) 10 MEQ TbSR Take 1 tablet (10 mEq  "total) by mouth once daily. 90 tablet 4    predniSONE (DELTASONE) 5 MG tablet TAKE 1 TABLET BY MOUTH EVERY DAY IN THE MORNING 90 tablet 3    spironolactone (ALDACTONE) 25 MG tablet Take 1 tablet (25 mg total) by mouth once daily. 90 tablet 3    tacrolimus (PROGRAF) 1 MG Cap Two po in am and one po in pm 270 capsule 3    vitamin D 1000 units Tab Take 370 mg by mouth 2 (two) times daily. 2 tablets BID      ammonium lactate 12 % Crea Apply 1 g topically once daily. 140 g 1    FLUAD QUAD 2021-22,65Y UP,,PF, 60 mcg (15 mcg x 4)/0.5 mL Syrg       [DISCONTINUED] albuterol (ACCUNEB) 1.25 mg/3 mL Nebu Take 3 mLs (1.25 mg total) by nebulization every 6 (six) hours as needed. Rescue 1 Box 3       Past Surgical History:   Procedure Laterality Date    CARDIOVERSION  04/30/15    CHOLECYSTECTOMY      COLONOSCOPY  April 20, 2011    Diverticulosis, repeat recommended in 3 yrs., repeat colonoscopy 2014 revealed 2 polyps.  He should return in 5 years.    COLONOSCOPY N/A 3/13/2020    Procedure: COLONOSCOPY;  Surgeon: Chon Casper MD;  Location: [facility] (4TH FLR);  Service: Endoscopy;  Laterality: N/A;  ok to hold coumadin x5days-see telephone encounter 2/4/20-tb    COLONOSCOPY N/A 2/4/2021    Procedure: COLONOSCOPY;  Surgeon: Chon Casper MD;  Location: [facility] (4TH FLR);  Service: Endoscopy;  Laterality: N/A;  Eliquis - per Dr. GAVIN Blunt ok to hold x 2 days and "restarted asap"- ERW  last prep "poor", [id number] ordered/ Pt requests Dr. Casper  prep ins. mailed - COVID screening on 2/1/21 PCW- ERW    COLONOSCOPY N/A 3/3/2021    Procedure: COLONOSCOPY;  Surgeon: Chon Casper MD;  Location: [facility] (4TH FLR);  Service: Endoscopy;  Laterality: N/A;  Patient with his second poor bowel pre and has poor prep today.  If patient not intersted or can't get colonoscopy tomorrow will need constipation bowel prep and will need to restart his Eliquis today.Thanks,Chon     per Dr Blunt-ok to hold Eliquis 2 days " prior      COVID     CORONARY ANGIOPLASTY WITH STENT PLACEMENT  2006    KIDNEY TRANSPLANT  2005    PARATHYROIDECTOMY         Social History     Socioeconomic History    Marital status:    Occupational History    Occupation:    Tobacco Use    Smoking status: Former Smoker     Packs/day: 0.50     Years: 40.00     Pack years: 20.00     Types: Cigarettes     Quit date: 2022     Years since quittin.0    Smokeless tobacco: Never Used    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   Substance and Sexual Activity    Alcohol use: No    Drug use: No    Sexual activity: Yes     Partners: Female   Social History Narrative    Retired            2D Echo:  Results for orders placed or performed during the hospital encounter of 17   2D echo with color flow doppler   Result Value Ref Range    EF + QEF 55 55 - 65    Diastolic Dysfunction No     Aortic Valve Regurgitation TRIVIAL     Est. PA Systolic Pressure 39.24     Tricuspid Valve Regurgitation MILD            Pre-op Assessment    I have reviewed the Patient Summary Reports.     I have reviewed the Nursing Notes. I have reviewed the NPO Status.      Review of Systems  Anesthesia Hx:  No problems with previous Anesthesia    Cardiovascular:   Hypertension CAD   CHF DAUGHERTY    Pulmonary:   COPD    Renal/:   Chronic Renal Disease, CRI    Neurological:   Neuromuscular Disease,        Physical Exam  General: Well nourished    Airway:  Mallampati: II   Mouth Opening: Normal  Tongue: Normal    Chest/Lungs:  Normal Respiratory Rate    Heart:  Rate: Normal        Anesthesia Plan  Type of Anesthesia, risks & benefits discussed:    Anesthesia Type: Gen Natural Airway  Intra-op Monitoring Plan: Standard ASA Monitors  Post Op Pain Control Plan: multimodal analgesia  Informed Consent: Informed consent signed with the Patient and all parties understand the risks and agree with  anesthesia plan.  All questions answered.   ASA Score: 3    Ready For Surgery From Anesthesia Perspective.     .

## 2022-03-28 NOTE — PROGRESS NOTES
Report received from EP lab. Patient s/p DCCV. Vss. Patient connected to bedside monitor and cont tele. Vss.

## 2022-03-28 NOTE — ASSESSMENT & PLAN NOTE
s/p DCCV with restoration of sinus rhythm    Plan:  - Continue PTA medications  - AAD: continue flecainide 100 mg BID  - Follow up with ECG in 1 week (4/4/22)  - Follow up with Dr. PRASAD Blunt ~ 4 weeks  - Discharge plans/instructions discussed with patient who verbalized understanding and agreement of plans of care. No further questions or concerns  voiced at this time.

## 2022-04-04 ENCOUNTER — HOSPITAL ENCOUNTER (OUTPATIENT)
Dept: CARDIOLOGY | Facility: CLINIC | Age: 67
Discharge: HOME | End: 2022-04-04
Payer: MEDICARE

## 2022-04-04 DIAGNOSIS — I48.91 ATRIAL FIBRILLATION, UNSPECIFIED TYPE: ICD-10-CM

## 2022-04-04 PROCEDURE — 93005 ELECTROCARDIOGRAM TRACING: CPT | Mod: S$GLB,,, | Performed by: INTERNAL MEDICINE

## 2022-04-04 PROCEDURE — 93010 EKG 12-LEAD: ICD-10-PCS | Mod: S$GLB,,, | Performed by: INTERNAL MEDICINE

## 2022-04-04 PROCEDURE — 93010 ELECTROCARDIOGRAM REPORT: CPT | Mod: S$GLB,,, | Performed by: INTERNAL MEDICINE

## 2022-04-04 PROCEDURE — 93005 EKG 12-LEAD: ICD-10-PCS | Mod: S$GLB,,, | Performed by: INTERNAL MEDICINE

## 2022-04-07 ENCOUNTER — PATIENT MESSAGE (OUTPATIENT)
Dept: INTERNAL MEDICINE | Facility: CLINIC | Age: 67
End: 2022-04-07
Payer: MEDICARE

## 2022-04-18 ENCOUNTER — PATIENT MESSAGE (OUTPATIENT)
Dept: INTERNAL MEDICINE | Facility: CLINIC | Age: 67
End: 2022-04-18
Payer: MEDICARE

## 2022-04-18 DIAGNOSIS — Z94.0 KIDNEY REPLACED BY TRANSPLANT: Primary | ICD-10-CM

## 2022-04-18 DIAGNOSIS — J43.8 OTHER EMPHYSEMA: Primary | ICD-10-CM

## 2022-04-18 RX ORDER — ALBUTEROL SULFATE 90 UG/1
2 AEROSOL, METERED RESPIRATORY (INHALATION) EVERY 6 HOURS PRN
Qty: 18 G | Refills: 3 | Status: SHIPPED | OUTPATIENT
Start: 2022-04-18

## 2022-04-19 ENCOUNTER — PATIENT MESSAGE (OUTPATIENT)
Dept: INTERNAL MEDICINE | Facility: CLINIC | Age: 67
End: 2022-04-19
Payer: MEDICARE

## 2022-04-19 ENCOUNTER — TELEPHONE (OUTPATIENT)
Dept: TRANSPLANT | Facility: CLINIC | Age: 67
End: 2022-04-19
Payer: MEDICARE

## 2022-04-21 ENCOUNTER — OFFICE VISIT (OUTPATIENT)
Dept: INTERNAL MEDICINE | Facility: CLINIC | Age: 67
End: 2022-04-21
Payer: MEDICARE

## 2022-04-21 VITALS
DIASTOLIC BLOOD PRESSURE: 70 MMHG | SYSTOLIC BLOOD PRESSURE: 136 MMHG | HEART RATE: 52 BPM | BODY MASS INDEX: 32.34 KG/M2 | OXYGEN SATURATION: 99 % | WEIGHT: 244.06 LBS | HEIGHT: 73 IN

## 2022-04-21 DIAGNOSIS — N18.4 CHRONIC KIDNEY DISEASE, STAGE 4 (SEVERE): ICD-10-CM

## 2022-04-21 DIAGNOSIS — R73.03 PREDIABETES: ICD-10-CM

## 2022-04-21 DIAGNOSIS — Z94.0 H/O KIDNEY TRANSPLANT: ICD-10-CM

## 2022-04-21 DIAGNOSIS — I13.0 HYPERTENSIVE HEART AND RENAL DISEASE WITH RENAL FAILURE, STAGE 1 THROUGH STAGE 4 OR UNSPECIFIED CHRONIC KIDNEY DISEASE, WITH HEART FAILURE: ICD-10-CM

## 2022-04-21 DIAGNOSIS — N18.32 ANEMIA DUE TO STAGE 3B CHRONIC KIDNEY DISEASE: ICD-10-CM

## 2022-04-21 DIAGNOSIS — I50.32 CHRONIC DIASTOLIC HEART FAILURE: ICD-10-CM

## 2022-04-21 DIAGNOSIS — D84.821 IMMUNODEFICIENCY DUE TO TREATMENT WITH IMMUNOSUPPRESSIVE MEDICATION: ICD-10-CM

## 2022-04-21 DIAGNOSIS — I48.0 PAROXYSMAL ATRIAL FIBRILLATION: ICD-10-CM

## 2022-04-21 DIAGNOSIS — Z79.01 LONG TERM (CURRENT) USE OF ANTICOAGULANTS: ICD-10-CM

## 2022-04-21 DIAGNOSIS — J43.8 OTHER EMPHYSEMA: ICD-10-CM

## 2022-04-21 DIAGNOSIS — R91.8 MULTIPLE LUNG NODULES ON CT: ICD-10-CM

## 2022-04-21 DIAGNOSIS — Z79.899 IMMUNODEFICIENCY DUE TO TREATMENT WITH IMMUNOSUPPRESSIVE MEDICATION: ICD-10-CM

## 2022-04-21 DIAGNOSIS — R06.09 DOE (DYSPNEA ON EXERTION): Primary | ICD-10-CM

## 2022-04-21 DIAGNOSIS — E78.2 MIXED HYPERLIPIDEMIA: ICD-10-CM

## 2022-04-21 DIAGNOSIS — Z12.5 SCREENING PSA (PROSTATE SPECIFIC ANTIGEN): ICD-10-CM

## 2022-04-21 DIAGNOSIS — D63.1 ANEMIA DUE TO STAGE 3B CHRONIC KIDNEY DISEASE: ICD-10-CM

## 2022-04-21 PROCEDURE — 1101F PT FALLS ASSESS-DOCD LE1/YR: CPT | Mod: CPTII,S$GLB,, | Performed by: INTERNAL MEDICINE

## 2022-04-21 PROCEDURE — 3044F HG A1C LEVEL LT 7.0%: CPT | Mod: CPTII,S$GLB,, | Performed by: INTERNAL MEDICINE

## 2022-04-21 PROCEDURE — 1126F PR PAIN SEVERITY QUANTIFIED, NO PAIN PRESENT: ICD-10-PCS | Mod: CPTII,S$GLB,, | Performed by: INTERNAL MEDICINE

## 2022-04-21 PROCEDURE — 1159F PR MEDICATION LIST DOCUMENTED IN MEDICAL RECORD: ICD-10-PCS | Mod: CPTII,S$GLB,, | Performed by: INTERNAL MEDICINE

## 2022-04-21 PROCEDURE — 99999 PR PBB SHADOW E&M-EST. PATIENT-LVL V: ICD-10-PCS | Mod: PBBFAC,,, | Performed by: INTERNAL MEDICINE

## 2022-04-21 PROCEDURE — 1126F AMNT PAIN NOTED NONE PRSNT: CPT | Mod: CPTII,S$GLB,, | Performed by: INTERNAL MEDICINE

## 2022-04-21 PROCEDURE — 99214 PR OFFICE/OUTPT VISIT, EST, LEVL IV, 30-39 MIN: ICD-10-PCS | Mod: S$GLB,,, | Performed by: INTERNAL MEDICINE

## 2022-04-21 PROCEDURE — 1101F PR PT FALLS ASSESS DOC 0-1 FALLS W/OUT INJ PAST YR: ICD-10-PCS | Mod: CPTII,S$GLB,, | Performed by: INTERNAL MEDICINE

## 2022-04-21 PROCEDURE — 3288F PR FALLS RISK ASSESSMENT DOCUMENTED: ICD-10-PCS | Mod: CPTII,S$GLB,, | Performed by: INTERNAL MEDICINE

## 2022-04-21 PROCEDURE — 1159F MED LIST DOCD IN RCRD: CPT | Mod: CPTII,S$GLB,, | Performed by: INTERNAL MEDICINE

## 2022-04-21 PROCEDURE — 1160F PR REVIEW ALL MEDS BY PRESCRIBER/CLIN PHARMACIST DOCUMENTED: ICD-10-PCS | Mod: CPTII,S$GLB,, | Performed by: INTERNAL MEDICINE

## 2022-04-21 PROCEDURE — 3078F PR MOST RECENT DIASTOLIC BLOOD PRESSURE < 80 MM HG: ICD-10-PCS | Mod: CPTII,S$GLB,, | Performed by: INTERNAL MEDICINE

## 2022-04-21 PROCEDURE — 3078F DIAST BP <80 MM HG: CPT | Mod: CPTII,S$GLB,, | Performed by: INTERNAL MEDICINE

## 2022-04-21 PROCEDURE — 1160F RVW MEDS BY RX/DR IN RCRD: CPT | Mod: CPTII,S$GLB,, | Performed by: INTERNAL MEDICINE

## 2022-04-21 PROCEDURE — 3066F NEPHROPATHY DOC TX: CPT | Mod: CPTII,S$GLB,, | Performed by: INTERNAL MEDICINE

## 2022-04-21 PROCEDURE — 3066F PR DOCUMENTATION OF TREATMENT FOR NEPHROPATHY: ICD-10-PCS | Mod: CPTII,S$GLB,, | Performed by: INTERNAL MEDICINE

## 2022-04-21 PROCEDURE — 3044F PR MOST RECENT HEMOGLOBIN A1C LEVEL <7.0%: ICD-10-PCS | Mod: CPTII,S$GLB,, | Performed by: INTERNAL MEDICINE

## 2022-04-21 PROCEDURE — 3008F PR BODY MASS INDEX (BMI) DOCUMENTED: ICD-10-PCS | Mod: CPTII,S$GLB,, | Performed by: INTERNAL MEDICINE

## 2022-04-21 PROCEDURE — 3008F BODY MASS INDEX DOCD: CPT | Mod: CPTII,S$GLB,, | Performed by: INTERNAL MEDICINE

## 2022-04-21 PROCEDURE — 3075F PR MOST RECENT SYSTOLIC BLOOD PRESS GE 130-139MM HG: ICD-10-PCS | Mod: CPTII,S$GLB,, | Performed by: INTERNAL MEDICINE

## 2022-04-21 PROCEDURE — 3288F FALL RISK ASSESSMENT DOCD: CPT | Mod: CPTII,S$GLB,, | Performed by: INTERNAL MEDICINE

## 2022-04-21 PROCEDURE — 99214 OFFICE O/P EST MOD 30 MIN: CPT | Mod: S$GLB,,, | Performed by: INTERNAL MEDICINE

## 2022-04-21 PROCEDURE — 3075F SYST BP GE 130 - 139MM HG: CPT | Mod: CPTII,S$GLB,, | Performed by: INTERNAL MEDICINE

## 2022-04-21 PROCEDURE — 99999 PR PBB SHADOW E&M-EST. PATIENT-LVL V: CPT | Mod: PBBFAC,,, | Performed by: INTERNAL MEDICINE

## 2022-04-21 RX ORDER — NIFEDIPINE 60 MG/1
60 TABLET, EXTENDED RELEASE ORAL 2 TIMES DAILY
Qty: 60 TABLET | Refills: 0 | Status: SHIPPED | OUTPATIENT
Start: 2022-04-21 | End: 2022-05-02 | Stop reason: SDUPTHER

## 2022-04-21 NOTE — Clinical Note
Vikas Blunt, I saw Mr. Phelps today for c/o DAUGHERTY, seems chronic but he finds that lately he has to take a deep breath and gets winded when he exerts himself.   We ordered PFTs in Feb: sprirometry normal.  Not much relief clinically c Albuterol. CT chest: stable findings, calcified granulomas, nothing remarkable. On Flecainide c scheduled CV for A fib done in March with f/u EKG showing SR, his rhythm still sounds normal today. Vitals look good with exception of mild bradycardia but he's on BB and CCB. Euvolemic on exam. His last nuclear stress test was in 2020, neg for ischemia. Let me know if you have any other thoughts regarding his symptoms. I did say this may be multifactorial but that I would run it by you to see if you have any other thoughts. Thanks, Connie

## 2022-04-21 NOTE — PROGRESS NOTES
INTERNAL MEDICINE ESTABLISHED PATIENT VISIT NOTE    Subjective:     Chief Complaint: Shortness of Breath       Patient ID: Ibrahima Phelps is a 66 y.o. male with HTN c CKD3 and hx renal transplant x2 in 2002 and 2005 and on immunosuppressive meds and secondary hyperPTH, HLD, A fib, hx SVT, CAD c chronic HF c preserved EF, COPD c baseline DAUGHERTY and tob use, thrombocytopenia now resolved, anemia of chronic disease, preDM, GERD, lung nodule on CT 2/2020 c plan to repeat screening CT in one year, last seen by me in Jan, here today for f/u SOB.    Has been followed by Cardiology and has had A fib c RVR but medical tx options limited due to his transplant meds and renal impairment so was started on Flecainide and underwent scheduled CV on 3/28 and was back in sinus rhythm post procedure.  Is still on both Flecainide and Eliquis as rx'ed.    Had f/u EKG done on 4/4 which showed SR c PACs.    Today on further questioning, states SOB has been chronic but feels he has had more exertional dyspnea.  Denies cp.  Denies cough.  Occasional wheezing.  No PND or LE edema.  Has been on Lasix and Aldactone which has been unchanged.  Denies palpitations.  At visit c me in Jan, CT chest done for lung CA screen c lung opacities previously noted, likely calcified granulomas.  No findings that required further evaluation.  Also had PFTs done 2/2022 that showed normal spirometry but improved c bronchodilator.  Taking all meds as rx'ed.    Past Medical History:  Past Medical History:   Diagnosis Date    (HFpEF) heart failure with preserved ejection fraction 9/25/2017    Atrial fibrillation     CAD (coronary artery disease)     CHF (congestive heart failure)     Chronic diastolic heart failure 4/8/2020    CKD (chronic kidney disease) stage 3, GFR 30-59 ml/min     Dr. Latif    Diverticulosis     Fever blister     Heart attack 2006    Hyperlipidemia     Hypertension     Immunodeficiency due to treatment with immunosuppressive  medication     Keloid cicatrix     Metabolic bone disease     Pericarditis     S/P kidney transplant     Supraventricular tachycardia 06/2019    Thrombocytopenia     Thyroid disease     Unspecified disorder of kidney and ureter     Stage IIICKD       Home Medications:  Prior to Admission medications    Medication Sig Start Date End Date Taking? Authorizing Provider   albuterol (VENTOLIN HFA) 90 mcg/actuation inhaler Inhale 2 puffs into the lungs every 6 (six) hours as needed for Wheezing or Shortness of Breath. Rescue 4/18/22  Yes Connie Magdaleno MD   ammonium lactate 12 % Crea Apply 1 g topically once daily. 2/1/22  Yes Denia Kauffman DPM   apixaban (ELIQUIS) 5 mg Tab Take 1 tablet (5 mg total) by mouth 2 (two) times daily. 2/24/22  Yes Shagufta Blunt MD   aspirin (ECOTRIN) 81 MG EC tablet Take 1 tablet by mouth Daily. 6/11/12  Yes Historical Provider   atorvastatin (LIPITOR) 20 MG tablet Take 1 tablet (20 mg total) by mouth once daily. 3/8/22  Yes Shagufta Blunt MD   calcium carbonate (OS-TRISH) 500 mg calcium (1,250 mg) tablet TAKE 2 TABLETS BY MOUTH TWICE A DAY 2/24/22  Yes Emre Latif MD   doxazosin (CARDURA) 4 MG tablet TAKE 1 TABLET (4 MG TOTAL) BY MOUTH EVERY 12 (TWELVE) HOURS. 4/8/22  Yes Emre Latif MD   famotidine (PEPCID) 20 MG tablet Take 1 tablet (20 mg total) by mouth 2 (two) times daily. 2/24/22  Yes Emre Latif MD   fexofenadine (ALLEGRA) 60 MG tablet Take 1 tablet by mouth Twice daily as needed. 6/11/12  Yes Historical Provider   flecainide (TAMBOCOR) 100 MG Tab Take 1 tablet (100 mg total) by mouth every 12 (twelve) hours. 3/21/22 3/21/23 Yes Migue Blunt MD   FLUAD QUAD 2021-22,65Y UP,,PF, 60 mcg (15 mcg x 4)/0.5 mL Syrg  11/23/21  Yes Historical Provider   fluticasone propionate (FLONASE) 50 mcg/actuation nasal spray 1 spray (50 mcg total) by Each Nostril route once daily. 7/26/19  Yes Trish Worthy PA-C   furosemide (LASIX) 40 MG tablet Take 0.5 tablets (20  mg total) by mouth 2 (two) times daily. 1/31/22  Yes Connie Magdaleno MD   gabapentin (NEURONTIN) 300 MG capsule TAKE 1 CAPSULE BY MOUTH TWICE A DAY 10/25/21  Yes Connie Magdaleno MD   hydrALAZINE (APRESOLINE) 50 MG tablet Take 1 tablet (50 mg total) by mouth 3 (three) times daily. 2/24/22  Yes Emre Latif MD   metoprolol succinate (TOPROL-XL) 25 MG 24 hr tablet Take 1 tablet (25 mg total) by mouth once daily. 3/8/22 3/8/23 Yes Shagufta Blunt MD   multivitamin (THERAGRAN) per tablet Take 1 tablet by mouth Daily. 6/11/12  Yes Historical Provider   mycophenolate (CELLCEPT) 250 mg Cap Take 2 capsules (500 mg total) by mouth 2 (two) times daily. 4/7/22 4/2/23 Yes Emre Latif MD   nicotine (NICODERM CQ) 21 mg/24 hr Place 1 patch onto the skin once daily. 2/23/22  Yes Emre Latif MD   NIFEdipine (PROCARDIA-XL) 60 MG (OSM) 24 hr tablet Take 2 tablets (120 mg total) by mouth once daily.  Patient taking differently: Take 120 mg by mouth 2 (two) times a day. 8/15/21 8/15/22 Yes Indra Lopez MD   paricalcitoL (ZEMPLAR) 1 MCG capsule TAKE 1 TABLET BY MOUTH ON MONDAY, WEDNESDAY, & FRIDAY 2/24/22  Yes Emre Latif MD   potassium chloride (KLOR-CON 10) 10 MEQ TbSR Take 1 tablet (10 mEq total) by mouth once daily. 3/10/22  Yes Connie Magdaleno MD   predniSONE (DELTASONE) 5 MG tablet TAKE 1 TABLET BY MOUTH EVERY DAY IN THE MORNING 2/1/22  Yes Emre Latif MD   spironolactone (ALDACTONE) 25 MG tablet Take 1 tablet (25 mg total) by mouth once daily. 1/31/22  Yes Connie Magdaleno MD   tacrolimus (PROGRAF) 1 MG Cap Two po in am and one po in pm 2/24/22  Yes Emre Latif MD   vitamin D 1000 units Tab Take 370 mg by mouth 2 (two) times daily. 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   Substance Use Topics    Alcohol use: No    Drug use: No        Review of Systems   Constitutional: Negative for chills, fatigue and fever.   Respiratory: Positive for shortness of breath. Negative for cough and chest tightness.    Cardiovascular: Negative for chest pain.   Gastrointestinal: Negative for abdominal pain and blood in stool.   Genitourinary: Negative for dysuria and frequency.         Health Maintenance:     Immunizations:   Influenza 2021  TDap is up to date 3/2018  Pneumovax 2016, Prevnar 13 2017, pvax booster 2020  Shingrix 10/8/2020, 2020  COVID vaccine 21 Pfizer, 3/5/21, 2021     Cancer Screening:  Colonoscopy: is up to date. 2014, polyps, hyperplastic and tubular adenoma, rec f/u in 5 yrs, most recent csc done 3/2021 c area of patchy erythema, neg for colitis or dysplasia, f/u 2 yrs per report.  PSA 2020 wnl, will schedule repeat.  AAA u/s 2020 no AAA    Objective:   /70 (BP Location: Right arm, Patient Position: Sitting, BP Method: Large (Manual))   Pulse (!) 52   Ht 6' 1" (1.854 m)   Wt 110.7 kg (244 lb 0.8 oz)   SpO2 99%   BMI 32.20 kg/m²        General: AAO x3, no apparent distress  HEENT: PERRL, OP clear  CV: RRR, no m/r/g  Pulm: Lungs CTAB, no crackles, no wheezes  Abd: s/NT/ND +BS  Extremities: no c/c/e     Labs:     Lab Results   Component Value Date    WBC 6.36 2022    HGB 12.8 (L) 2022    HCT 43.0 2022    MCV 83 2022     2022     Sodium   Date Value Ref Range Status   2022 144 136 - 145 mmol/L Final     Potassium   Date Value Ref Range Status   2022 4.3 3.5 - " 5.1 mmol/L Final     Chloride   Date Value Ref Range Status   03/21/2022 105 95 - 110 mmol/L Final     CO2   Date Value Ref Range Status   03/21/2022 28 23 - 29 mmol/L Final     Glucose   Date Value Ref Range Status   03/21/2022 95 70 - 110 mg/dL Final     BUN   Date Value Ref Range Status   03/21/2022 27 (H) 8 - 23 mg/dL Final     Creatinine   Date Value Ref Range Status   03/21/2022 2.5 (H) 0.5 - 1.4 mg/dL Final     Calcium   Date Value Ref Range Status   03/21/2022 9.6 8.7 - 10.5 mg/dL Final     Total Protein   Date Value Ref Range Status   01/03/2022 6.2 6.0 - 8.4 g/dL Final     Albumin   Date Value Ref Range Status   01/03/2022 3.4 (L) 3.5 - 5.2 g/dL Final   01/03/2022 3.4 (L) 3.5 - 5.2 g/dL Final     Total Bilirubin   Date Value Ref Range Status   01/03/2022 0.5 0.1 - 1.0 mg/dL Final     Comment:     For infants and newborns, interpretation of results should be based  on gestational age, weight and in agreement with clinical  observations.    Premature Infant recommended reference ranges:  Up to 24 hours.............<8.0 mg/dL  Up to 48 hours............<12.0 mg/dL  3-5 days..................<15.0 mg/dL  6-29 days.................<15.0 mg/dL       Alkaline Phosphatase   Date Value Ref Range Status   01/03/2022 64 55 - 135 U/L Final     AST   Date Value Ref Range Status   01/03/2022 25 10 - 40 U/L Final     ALT   Date Value Ref Range Status   01/03/2022 24 10 - 44 U/L Final     Anion Gap   Date Value Ref Range Status   03/21/2022 11 8 - 16 mmol/L Final     eGFR if    Date Value Ref Range Status   03/21/2022 29.6 (A) >60 mL/min/1.73 m^2 Final     eGFR if non    Date Value Ref Range Status   03/21/2022 25.6 (A) >60 mL/min/1.73 m^2 Final     Comment:     Calculation used to obtain the estimated glomerular filtration  rate (eGFR) is the CKD-EPI equation.        Lab Results   Component Value Date    HGBA1C 5.3 01/03/2022     Lab Results   Component Value Date    LDLCALC 70.2  03/08/2022     Lab Results   Component Value Date    TSH 1.260 09/16/2021     PSA, Screen (ng/mL)   Date Value   12/09/2020 0.51         Assessment/Plan     Ibrahima was seen today for shortness of breath.    Diagnoses and all orders for this visit:    DAUGHERTY (dyspnea on exertion)  Likely multifactorial.  Hx tobacco use c emphysematous changes noted on CT but recent PFTs c normal spirometry.  Mult cardiac issues including A fib and HF could be contributing but currently clinically euvolemic and has remained in SR since CV (rhythm normal on exam today).  Additionally, had nuclear stress test in 2020 that was neg for ischemia.  Has f/u c Cardiology and EP in June which he was advised to keep, sooner if DAUGHERTY progresses.  Can use Albuterol prn sparingly if wheezing.    Paroxysmal atrial fibrillation  Long term (current) use of anticoagulants [V58.61]  SR since CV and on Flecainide  Pt to discuss sx and meds c Cards.  Cont current regimen for now.    Multiple lung nodules on CT  Stable on last CT in Feb, c/w calcified granulomas, can repeat CT next year for routine lung CA screening based on smoking hx    Other emphysema  As above, no acute issues, can cont Albuterol prn    Hypertensive heart and renal disease with renal failure, stage 1 through stage 4 or unspecified chronic kidney disease, with heart failure  At goal, okay to refill procardia  Cont meds  -     NIFEdipine (PROCARDIA-XL) 60 MG (OSM) 24 hr tablet; Take 1 tablet (60 mg total) by mouth 2 (two) times a day.    Chronic kidney disease, stage 4 (severe)  H/O kidney transplant  Immunodeficiency due to treatment with immunosuppressive medication  Stable on last check, cont f/u c transplant    Anemia due to stage 3b chronic kidney disease  Stable, no issues    Chronic diastolic heart failure  No acute issues, appears euvolemic on current regimen.    Prediabetes  Lab Results   Component Value Date    HGBA1C 5.3 01/03/2022     Normal on last check, can repeat annually, no  issues    Mixed hyperlipidemia  Lab Results   Component Value Date    LDLCALC 70.2 03/08/2022     at goal.  Cont current medications.    Screening PSA (prostate specific antigen)  -     PSA, Screening; Future      HM as above  RTC in Aug as previously scheduled, sooner if needed.    Connie Magdaleno MD  Department of Internal Medicine - [facility]  04/21/2022

## 2022-04-25 ENCOUNTER — TELEPHONE (OUTPATIENT)
Dept: CARDIOLOGY | Facility: CLINIC | Age: 67
End: 2022-04-25
Payer: MEDICARE

## 2022-04-25 DIAGNOSIS — R06.02 SHORTNESS OF BREATH: ICD-10-CM

## 2022-04-25 DIAGNOSIS — I25.10 CORONARY ARTERY DISEASE INVOLVING NATIVE CORONARY ARTERY OF NATIVE HEART WITHOUT ANGINA PECTORIS: Primary | ICD-10-CM

## 2022-04-25 NOTE — TELEPHONE ENCOUNTER
----- Message from Connie Magdaleno MD sent at 4/21/2022 11:51 AM CDT -----  Hi Dr. Blunt,  I saw Mr. Phelps today for c/o DAUGHERTY, seems chronic but he finds that lately he has to take a deep breath and gets winded when he exerts himself.    We ordered PFTs in Feb: sprirometry normal.  Not much relief clinically c Albuterol.  CT chest: stable findings, calcified granulomas, nothing remarkable.  On Flecainide c scheduled CV for A fib done in March with f/u EKG showing SR, his rhythm still sounds normal today.  Vitals look good with exception of mild bradycardia but he's on BB and CCB.  Euvolemic on exam.  His last nuclear stress test was in 2020, neg for ischemia.  Let me know if you have any other thoughts regarding his symptoms.  I did say this may be multifactorial but that I would run it by you to see if you have any other thoughts.  Thanks,  Connie

## 2022-04-25 NOTE — TELEPHONE ENCOUNTER
I was hoping he would feel better after his cardioversion. His LVEF and filling pressures were normal on his recent echo, so if he is still having DAUGHERTY, I will set him up for a repeat ischemic evaluation with a pharm SPECT.

## 2022-04-26 ENCOUNTER — LAB VISIT (OUTPATIENT)
Dept: LAB | Facility: HOSPITAL | Age: 67
End: 2022-04-26
Payer: MEDICARE

## 2022-04-26 DIAGNOSIS — Z94.0 KIDNEY REPLACED BY TRANSPLANT: ICD-10-CM

## 2022-04-26 LAB
ALBUMIN SERPL BCP-MCNC: 3.5 G/DL (ref 3.5–5.2)
ALBUMIN SERPL BCP-MCNC: 3.5 G/DL (ref 3.5–5.2)
ALP SERPL-CCNC: 58 U/L (ref 55–135)
ALT SERPL W/O P-5'-P-CCNC: 20 U/L (ref 10–44)
ANION GAP SERPL CALC-SCNC: 13 MMOL/L (ref 8–16)
AST SERPL-CCNC: 17 U/L (ref 10–40)
BASOPHILS # BLD AUTO: 0.02 K/UL (ref 0–0.2)
BASOPHILS NFR BLD: 0.3 % (ref 0–1.9)
BILIRUB DIRECT SERPL-MCNC: 0.3 MG/DL (ref 0.1–0.3)
BILIRUB SERPL-MCNC: 0.6 MG/DL (ref 0.1–1)
BUN SERPL-MCNC: 38 MG/DL (ref 8–23)
CALCIUM SERPL-MCNC: 9 MG/DL (ref 8.7–10.5)
CHLORIDE SERPL-SCNC: 103 MMOL/L (ref 95–110)
CO2 SERPL-SCNC: 26 MMOL/L (ref 23–29)
CREAT SERPL-MCNC: 3.5 MG/DL (ref 0.5–1.4)
DIFFERENTIAL METHOD: ABNORMAL
EOSINOPHIL # BLD AUTO: 0.3 K/UL (ref 0–0.5)
EOSINOPHIL NFR BLD: 4 % (ref 0–8)
ERYTHROCYTE [DISTWIDTH] IN BLOOD BY AUTOMATED COUNT: 14.9 % (ref 11.5–14.5)
EST. GFR  (AFRICAN AMERICAN): 19.7 ML/MIN/1.73 M^2
EST. GFR  (NON AFRICAN AMERICAN): 17 ML/MIN/1.73 M^2
GLUCOSE SERPL-MCNC: 103 MG/DL (ref 70–110)
HCT VFR BLD AUTO: 36.5 % (ref 40–54)
HGB BLD-MCNC: 11.1 G/DL (ref 14–18)
IMM GRANULOCYTES # BLD AUTO: 0.03 K/UL (ref 0–0.04)
IMM GRANULOCYTES NFR BLD AUTO: 0.4 % (ref 0–0.5)
LYMPHOCYTES # BLD AUTO: 1.2 K/UL (ref 1–4.8)
LYMPHOCYTES NFR BLD: 16.5 % (ref 18–48)
MAGNESIUM SERPL-MCNC: 2 MG/DL (ref 1.6–2.6)
MCH RBC QN AUTO: 24.6 PG (ref 27–31)
MCHC RBC AUTO-ENTMCNC: 30.4 G/DL (ref 32–36)
MCV RBC AUTO: 81 FL (ref 82–98)
MONOCYTES # BLD AUTO: 0.8 K/UL (ref 0.3–1)
MONOCYTES NFR BLD: 11 % (ref 4–15)
NEUTROPHILS # BLD AUTO: 5 K/UL (ref 1.8–7.7)
NEUTROPHILS NFR BLD: 67.8 % (ref 38–73)
NRBC BLD-RTO: 0 /100 WBC
PHOSPHATE SERPL-MCNC: 4.5 MG/DL (ref 2.7–4.5)
PLATELET # BLD AUTO: 167 K/UL (ref 150–450)
PMV BLD AUTO: 12.7 FL (ref 9.2–12.9)
POTASSIUM SERPL-SCNC: 4 MMOL/L (ref 3.5–5.1)
PROT SERPL-MCNC: 6.5 G/DL (ref 6–8.4)
RBC # BLD AUTO: 4.52 M/UL (ref 4.6–6.2)
SODIUM SERPL-SCNC: 142 MMOL/L (ref 136–145)
TACROLIMUS BLD-MCNC: 2.7 NG/ML (ref 5–15)
WBC # BLD AUTO: 7.44 K/UL (ref 3.9–12.7)

## 2022-04-26 PROCEDURE — 83735 ASSAY OF MAGNESIUM: CPT | Performed by: NURSE PRACTITIONER

## 2022-04-26 PROCEDURE — 80197 ASSAY OF TACROLIMUS: CPT | Performed by: NURSE PRACTITIONER

## 2022-04-26 PROCEDURE — 84075 ASSAY ALKALINE PHOSPHATASE: CPT | Performed by: NURSE PRACTITIONER

## 2022-04-26 PROCEDURE — 36415 COLL VENOUS BLD VENIPUNCTURE: CPT | Performed by: NURSE PRACTITIONER

## 2022-04-26 PROCEDURE — 85025 COMPLETE CBC W/AUTO DIFF WBC: CPT | Performed by: NURSE PRACTITIONER

## 2022-04-26 PROCEDURE — 80069 RENAL FUNCTION PANEL: CPT | Performed by: NURSE PRACTITIONER

## 2022-04-27 DIAGNOSIS — Z94.0 KIDNEY REPLACED BY TRANSPLANT: ICD-10-CM

## 2022-04-27 NOTE — TELEPHONE ENCOUNTER
Spoke with patient.  Confirmed Tacrolimus level from 4/26/22 is a true 12 hour trough.  Advised patient to then increase Tacrolimus dosing to 2mg in am and 2mg in pm.  Verbalized understanding.  Also emphasized need to increase hydration.  Also verbalized understanding.  Attempted to schedule repeat labs.  Patient stated he would prefer for us to contact him back next week after he has a stress test in am (scheduled by Dr. Blunt) because he wants to see how that turns out.  Advised he would be making his appointments next week.    ----- Message from Mandie Lomeli NP sent at 4/26/2022  1:23 PM CDT -----  Result reviewed, Action needed.   Confirm 12 hour trough , level is subtherapeutic, should be taking prograf 2/1  If a true trough increase prograf 2/2  Encourage adequate hydration,  and scr elevated from baseline ~ 2.2  Repeat labs ~ 2 weeks  Should be following with general nephrology for CKD care   If no improvement with repeat labs--schedule kidney allograft US

## 2022-04-28 ENCOUNTER — HOSPITAL ENCOUNTER (OUTPATIENT)
Dept: CARDIOLOGY | Facility: HOSPITAL | Age: 67
Discharge: HOME | End: 2022-04-28
Attending: INTERNAL MEDICINE
Payer: MEDICARE

## 2022-04-28 VITALS
WEIGHT: 244 LBS | BODY MASS INDEX: 32.34 KG/M2 | HEIGHT: 73 IN | DIASTOLIC BLOOD PRESSURE: 59 MMHG | HEART RATE: 58 BPM | SYSTOLIC BLOOD PRESSURE: 163 MMHG

## 2022-04-28 DIAGNOSIS — I25.10 CORONARY ARTERY DISEASE INVOLVING NATIVE CORONARY ARTERY OF NATIVE HEART WITHOUT ANGINA PECTORIS: ICD-10-CM

## 2022-04-28 DIAGNOSIS — R06.02 SHORTNESS OF BREATH: ICD-10-CM

## 2022-04-28 LAB
CV STRESS BASE HR: 55 BPM
DIASTOLIC BLOOD PRESSURE: 65 MMHG
EJECTION FRACTION- HIGH: 65 %
END DIASTOLIC INDEX-HIGH: 153 ML/M2
END DIASTOLIC INDEX-LOW: 93 ML/M2
END SYSTOLIC INDEX-HIGH: 71 ML/M2
END SYSTOLIC INDEX-LOW: 31 ML/M2
OHS CV CPX 1 MINUTE RECOVERY HEART RATE: 61 BPM
OHS CV CPX 85 PERCENT MAX PREDICTED HEART RATE MALE: 130
OHS CV CPX MAX PREDICTED HEART RATE: 153
OHS CV CPX PATIENT IS FEMALE: 0
OHS CV CPX PATIENT IS MALE: 1
OHS CV CPX PEAK HEAR RATE: 57 BPM
OHS CV CPX PERCENT MAX PREDICTED HEART RATE ACHIEVED: 37
OHS CV CPX RATE PRESSURE PRODUCT PRESENTING: 6160
RETIRED EF AND QEF - SEE NOTES: 53 %
SYSTOLIC BLOOD PRESSURE: 112 MMHG

## 2022-04-28 PROCEDURE — 93018 CV STRESS TEST I&R ONLY: CPT | Mod: ,,, | Performed by: INTERNAL MEDICINE

## 2022-04-28 PROCEDURE — 63600175 PHARM REV CODE 636 W HCPCS: Performed by: INTERNAL MEDICINE

## 2022-04-28 PROCEDURE — 78452 STRESS TEST WITH MYOCARDIAL PERFUSION (CUPID ONLY): ICD-10-PCS | Mod: 26,,, | Performed by: INTERNAL MEDICINE

## 2022-04-28 PROCEDURE — 78452 HT MUSCLE IMAGE SPECT MULT: CPT | Mod: 26,,, | Performed by: INTERNAL MEDICINE

## 2022-04-28 PROCEDURE — A9502 TC99M TETROFOSMIN: HCPCS

## 2022-04-28 PROCEDURE — 93016 CV STRESS TEST SUPVJ ONLY: CPT | Mod: ,,, | Performed by: INTERNAL MEDICINE

## 2022-04-28 PROCEDURE — 93018 STRESS TEST WITH MYOCARDIAL PERFUSION (CUPID ONLY): ICD-10-PCS | Mod: ,,, | Performed by: INTERNAL MEDICINE

## 2022-04-28 PROCEDURE — 93016 STRESS TEST WITH MYOCARDIAL PERFUSION (CUPID ONLY): ICD-10-PCS | Mod: ,,, | Performed by: INTERNAL MEDICINE

## 2022-04-28 RX ORDER — REGADENOSON 0.08 MG/ML
0.4 INJECTION, SOLUTION INTRAVENOUS ONCE
Status: COMPLETED | OUTPATIENT
Start: 2022-04-28 | End: 2022-04-28

## 2022-04-28 RX ORDER — AMINOPHYLLINE 25 MG/ML
75 INJECTION, SOLUTION INTRAVENOUS ONCE
Status: COMPLETED | OUTPATIENT
Start: 2022-04-28 | End: 2022-04-28

## 2022-04-28 RX ORDER — TACROLIMUS 1 MG/1
CAPSULE ORAL
Qty: 270 CAPSULE | Refills: 3
Start: 2022-04-28 | End: 2022-10-11

## 2022-04-28 RX ADMIN — AMINOPHYLLINE 75 MG: 25 INJECTION, SOLUTION INTRAVENOUS at 09:04

## 2022-04-28 RX ADMIN — REGADENOSON 0.4 MG: 0.08 INJECTION, SOLUTION INTRAVENOUS at 09:04

## 2022-05-02 DIAGNOSIS — I13.0 HYPERTENSIVE HEART AND RENAL DISEASE WITH RENAL FAILURE, STAGE 1 THROUGH STAGE 4 OR UNSPECIFIED CHRONIC KIDNEY DISEASE, WITH HEART FAILURE: ICD-10-CM

## 2022-05-02 NOTE — TELEPHONE ENCOUNTER
No new care gaps identified.  Powered by [employer]. Reference number: [id number].   5/02/2022 4:40:38 PM CDT

## 2022-05-03 RX ORDER — NIFEDIPINE 60 MG/1
60 TABLET, EXTENDED RELEASE ORAL 2 TIMES DAILY
Qty: 180 TABLET | Refills: 3 | Status: SHIPPED | OUTPATIENT
Start: 2022-05-03 | End: 2022-07-20 | Stop reason: SDUPTHER

## 2022-05-04 ENCOUNTER — OFFICE VISIT (OUTPATIENT)
Dept: TRANSPLANT | Facility: CLINIC | Age: 67
End: 2022-05-04
Payer: MEDICARE

## 2022-05-04 VITALS
RESPIRATION RATE: 16 BRPM | SYSTOLIC BLOOD PRESSURE: 179 MMHG | HEIGHT: 73 IN | OXYGEN SATURATION: 96 % | HEART RATE: 65 BPM | BODY MASS INDEX: 32.29 KG/M2 | TEMPERATURE: 97 F | WEIGHT: 243.63 LBS | DIASTOLIC BLOOD PRESSURE: 81 MMHG

## 2022-05-04 DIAGNOSIS — E78.2 MIXED HYPERLIPIDEMIA: ICD-10-CM

## 2022-05-04 DIAGNOSIS — I25.10 CORONARY ARTERY DISEASE INVOLVING NATIVE CORONARY ARTERY OF NATIVE HEART WITHOUT ANGINA PECTORIS: ICD-10-CM

## 2022-05-04 DIAGNOSIS — Z79.01 LONG TERM (CURRENT) USE OF ANTICOAGULANTS: ICD-10-CM

## 2022-05-04 DIAGNOSIS — Z79.899 IMMUNODEFICIENCY DUE TO TREATMENT WITH IMMUNOSUPPRESSIVE MEDICATION: ICD-10-CM

## 2022-05-04 DIAGNOSIS — I48.0 PAROXYSMAL ATRIAL FIBRILLATION: ICD-10-CM

## 2022-05-04 DIAGNOSIS — Z94.0 KIDNEY REPLACED BY TRANSPLANT: Primary | ICD-10-CM

## 2022-05-04 DIAGNOSIS — D84.821 IMMUNODEFICIENCY DUE TO TREATMENT WITH IMMUNOSUPPRESSIVE MEDICATION: ICD-10-CM

## 2022-05-04 PROCEDURE — 1101F PT FALLS ASSESS-DOCD LE1/YR: CPT | Mod: CPTII,S$GLB,, | Performed by: NURSE PRACTITIONER

## 2022-05-04 PROCEDURE — 99999 PR PBB SHADOW E&M-EST. PATIENT-LVL V: ICD-10-PCS | Mod: PBBFAC,,, | Performed by: NURSE PRACTITIONER

## 2022-05-04 PROCEDURE — 1159F MED LIST DOCD IN RCRD: CPT | Mod: CPTII,S$GLB,, | Performed by: NURSE PRACTITIONER

## 2022-05-04 PROCEDURE — 3044F PR MOST RECENT HEMOGLOBIN A1C LEVEL <7.0%: ICD-10-PCS | Mod: CPTII,S$GLB,, | Performed by: NURSE PRACTITIONER

## 2022-05-04 PROCEDURE — 3079F PR MOST RECENT DIASTOLIC BLOOD PRESSURE 80-89 MM HG: ICD-10-PCS | Mod: CPTII,S$GLB,, | Performed by: NURSE PRACTITIONER

## 2022-05-04 PROCEDURE — 99215 OFFICE O/P EST HI 40 MIN: CPT | Mod: S$GLB,,, | Performed by: NURSE PRACTITIONER

## 2022-05-04 PROCEDURE — 1126F AMNT PAIN NOTED NONE PRSNT: CPT | Mod: CPTII,S$GLB,, | Performed by: NURSE PRACTITIONER

## 2022-05-04 PROCEDURE — 3079F DIAST BP 80-89 MM HG: CPT | Mod: CPTII,S$GLB,, | Performed by: NURSE PRACTITIONER

## 2022-05-04 PROCEDURE — 3288F PR FALLS RISK ASSESSMENT DOCUMENTED: ICD-10-PCS | Mod: CPTII,S$GLB,, | Performed by: NURSE PRACTITIONER

## 2022-05-04 PROCEDURE — 99999 PR PBB SHADOW E&M-EST. PATIENT-LVL V: CPT | Mod: PBBFAC,,, | Performed by: NURSE PRACTITIONER

## 2022-05-04 PROCEDURE — 3008F BODY MASS INDEX DOCD: CPT | Mod: CPTII,S$GLB,, | Performed by: NURSE PRACTITIONER

## 2022-05-04 PROCEDURE — 1126F PR PAIN SEVERITY QUANTIFIED, NO PAIN PRESENT: ICD-10-PCS | Mod: CPTII,S$GLB,, | Performed by: NURSE PRACTITIONER

## 2022-05-04 PROCEDURE — 3077F PR MOST RECENT SYSTOLIC BLOOD PRESSURE >= 140 MM HG: ICD-10-PCS | Mod: CPTII,S$GLB,, | Performed by: NURSE PRACTITIONER

## 2022-05-04 PROCEDURE — 3008F PR BODY MASS INDEX (BMI) DOCUMENTED: ICD-10-PCS | Mod: CPTII,S$GLB,, | Performed by: NURSE PRACTITIONER

## 2022-05-04 PROCEDURE — 3077F SYST BP >= 140 MM HG: CPT | Mod: CPTII,S$GLB,, | Performed by: NURSE PRACTITIONER

## 2022-05-04 PROCEDURE — 99215 PR OFFICE/OUTPT VISIT, EST, LEVL V, 40-54 MIN: ICD-10-PCS | Mod: S$GLB,,, | Performed by: NURSE PRACTITIONER

## 2022-05-04 PROCEDURE — 3066F PR DOCUMENTATION OF TREATMENT FOR NEPHROPATHY: ICD-10-PCS | Mod: CPTII,S$GLB,, | Performed by: NURSE PRACTITIONER

## 2022-05-04 PROCEDURE — 3066F NEPHROPATHY DOC TX: CPT | Mod: CPTII,S$GLB,, | Performed by: NURSE PRACTITIONER

## 2022-05-04 PROCEDURE — 3044F HG A1C LEVEL LT 7.0%: CPT | Mod: CPTII,S$GLB,, | Performed by: NURSE PRACTITIONER

## 2022-05-04 PROCEDURE — 1159F PR MEDICATION LIST DOCUMENTED IN MEDICAL RECORD: ICD-10-PCS | Mod: CPTII,S$GLB,, | Performed by: NURSE PRACTITIONER

## 2022-05-04 PROCEDURE — 3288F FALL RISK ASSESSMENT DOCD: CPT | Mod: CPTII,S$GLB,, | Performed by: NURSE PRACTITIONER

## 2022-05-04 PROCEDURE — 1101F PR PT FALLS ASSESS DOC 0-1 FALLS W/OUT INJ PAST YR: ICD-10-PCS | Mod: CPTII,S$GLB,, | Performed by: NURSE PRACTITIONER

## 2022-05-04 NOTE — PROGRESS NOTES
Kidney Post-Transplant Assessment    Referring Physician:   Current Nephrologist: Emre Latif    ORGAN: LEFT KIDNEY  Donor Type:    PHS Increased Risk:    Cold Ischemia: 504 mins  Induction Medications: thymoglobulin    Subjective:     CC:  Reassessment of renal allograft function and management of chronic immunosuppression.     Kidney History:  Mr. Phelps is a 64 y.o. year old Black or  male with history of ESRD secondary to unclear etiology who was on dialysis started in ~Jan 1992 [place] [facility] [place] to [place] and he resumed dialysis in April 1996 until receiving DDRT #2 (THYMO x3 induction, CMV D-/R+) on 4/12/05. He had transplant nephrectomy of DDRT #1 (possibly in 1997 vs 2005 around time of second transplant). He has CKD stage 3 - GFR 30-59 and his baseline creatinine is between 2-2.5. He takes mycophenolate mofetil, prednisone and tacrolimus for maintenance immunosuppression.      He has history of A-fib s/p DCCV in 4/2009 on warfarin, recurrent issues with pericarditis, gout, CAD s/p stent placement to LAD in 2006.  Last cardioversion 3/28/22    Interval HX:  Last cardioversion 3/28/22. Has been following with Dr. Loera--patient reports progression of CKD was discussed with her. Only new medication was flecainide. He is drinking 2 quart of water and reports good UOP.       Lab /diagnostic results reviewed with patient today.   All questions answered    Review of Systems   Constitutional: Negative for activity change, appetite change, chills, fatigue, fever and unexpected weight change.   HENT: Negative for congestion, facial swelling, postnasal drip, rhinorrhea, sinus pressure, sore throat and trouble swallowing.          +chronic tinnitus   Eyes: Negative for pain, redness and visual disturbance.   Respiratory: Negative for cough, chest tightness, shortness of breath and wheezing.    Cardiovascular: Positive for palpitations and  "leg swelling. Negative for chest pain.        Hx Afib   Gastrointestinal: Negative for abdominal pain, diarrhea, nausea and vomiting.   Genitourinary: Negative for dysuria, flank pain and urgency.   Musculoskeletal: Negative for gait problem, neck pain and neck stiffness.   Skin: Negative for rash.   Allergic/Immunologic: Positive for immunocompromised state. Negative for environmental allergies and food allergies.   Neurological: Positive for tremors. Negative for dizziness, weakness, light-headedness and headaches.         +right handed tremor for the last 30 years   Hematological: Bruises/bleeds easily.        Eliquis and ASA   Psychiatric/Behavioral: Negative for agitation and confusion. The patient is not nervous/anxious.        Objective:   Blood pressure (!) 179/81, pulse 65, temperature 97.3 °F (36.3 °C), temperature source Temporal, resp. rate 16, height 6' 1" (1.854 m), weight 110.5 kg (243 lb 9.7 oz), SpO2 96 %.body mass index is 32.14 kg/m².    Physical Exam  Vitals reviewed.   Constitutional:       Appearance: Normal appearance. He is well-developed.   HENT:      Head: Normocephalic.   Eyes:      Pupils: Pupils are equal, round, and reactive to light.   Cardiovascular:      Rate and Rhythm: Normal rate and regular rhythm.      Heart sounds: Murmur heard.    Systolic murmur is present with a grade of 1/6.   No diastolic murmur is present.  Pulmonary:      Effort: Pulmonary effort is normal.      Breath sounds: Normal breath sounds.   Abdominal:      General: Bowel sounds are normal.      Palpations: Abdomen is soft.       Musculoskeletal:         General: Normal range of motion.      Cervical back: Normal range of motion and neck supple.   Skin:     General: Skin is warm and dry.   Neurological:      Mental Status: He is alert and oriented to person, place, and time.      Motor: No abnormal muscle tone.   Psychiatric:         Behavior: Behavior normal.         Labs:  Lab Results   Component Value Date    " WBC 7.44 04/26/2022    HGB 11.1 (L) 04/26/2022    HCT 36.5 (L) 04/26/2022     04/26/2022    K 4.0 04/26/2022     04/26/2022    CO2 26 04/26/2022    BUN 38 (H) 04/26/2022    CREATININE 3.5 (H) 04/26/2022    EGFRNONAA 17.0 (A) 04/26/2022    GLUCOSE 121 (H) 04/12/2005    CALCIUM 9.0 04/26/2022    PHOS 4.5 04/26/2022    MG 2.0 04/26/2022    ALBUMIN 3.5 04/26/2022    ALBUMIN 3.5 04/26/2022    AST 17 04/26/2022    ALT 20 04/26/2022       No results found for: EXTANC, EXTWBC, EXTSEGS, EXTPLATELETS, EXTHEMOGLOBI, EXTHEMATOCRI, EXTCREATININ, EXTSODIUM, EXTPOTASSIUM, EXTBUN, EXTCO2, EXTCALCIUM, EXTPHOSPHORU, EXTGLUCOSE, EXTALBUMIN, EXTAST, EXTALT, EXTBILITOTAL, EXTLIPASE, EXTAMYLASE    No results found for: EXTCYCLOSLVL, EXTSIROLIMUS, EXTTACROLVL, EXTPROTCRE, EXTPTHINTACT, EXTPROTEINUA, EXTWBCUA, EXTRBCUA    Labs were reviewed with the patient.    Assessment:     1. Kidney replaced by transplant    2. Immunodeficiency due to treatment with immunosuppressive medication    3. Coronary artery disease involving native coronary artery of native heart without angina pectoris    4. Mixed hyperlipidemia    5. Paroxysmal atrial fibrillation    6. Long term (current) use of anticoagulants [V58.61]        Plan:     Continue to follow with PCP (health maintenance and health screening) and General Nephrologist (CKD care)  Continue current IS therapy regimen  Needs repeat labs and renal TXP US        Allograft function assessment  -CKD , remains stable.   Recent Labs   Lab 01/03/22  0834 03/21/22  1059 04/26/22  0815   Creatinine 2.3 H 2.5 H 3.5 H   eGFR if non  28.3 A 25.6 A 17.0 A   eGFR if  32.7 A 29.6 A 19.7 A      Lab Results   Component Value Date    GLUCOSE 121 (H) 04/12/2005    AMYLASE 76 05/13/2010    LIPASE 39 05/13/2010         Encounter for Monitoring Immunosuppression post Transplant  Recent Labs   Lab 07/13/21  0723 01/03/22  0834 04/26/22  0815   Tacrolimus Lvl 4.5 L 4.4 L 2.7 L    -Target level for Ibrahima is 4-6  -Recheck as per guidelines.  -Monitor for side effects and toxicities, given narrow therapeutic window and significant risk of AE       Evaluation for bone marrow suppression (r/t immunosuppression toxicity or infection)  Lab Results   Component Value Date    WBC 7.44 04/26/2022    HGB 11.1 (L) 04/26/2022    HCT 36.5 (L) 04/26/2022     04/26/2022       Renal hypertension--within target, watch  BP Readings from Last 3 Encounters:   05/04/22 (!) 179/81   04/28/22 (!) 163/59   04/21/22 136/70        Metabolic bone disease [Secondary hyperparathyroidism, Phosphorus metabolism disorders]  Recent Labs   Lab 01/05/21  0808 01/07/21  1300 04/19/21  0755 07/13/21  0723 08/15/21  0455 01/03/22  0834 03/21/22  1059 04/26/22  0815   Albumin 3.7  3.7  --  3.3 L  3.3 L 3.9  3.9 3.2 L 3.4 L  3.4 L  --  3.5  3.5   Calcium 9.2   < > 8.8 9.4 8.2 L 9.0 9.6 9.0   Phosphorus 4.9 H  --  4.0 4.1  --  4.3  --  4.5   Magnesium 1.8  --  1.8 2.0  --   --   --  2.0   PTH, Intact 23.0  --   --  41.0  --  43.0  --   --     < > = values in this interval not displayed.        Assessment of electrolytes  Lab Results   Component Value Date     04/26/2022    K 4.0 04/26/2022     04/26/2022    CO2 26 04/26/2022    MG 2.0 04/26/2022     Screening for proteinuria & urinary abnormalities  Recent Labs   Lab 07/13/21  0730 01/03/22  0821 04/26/22  0801   Protein, UA 1+ A  --  2+ A   Glucose, UA Negative  --  Negative   Occult Blood UA Negative  --  Negative   Leukocytes, UA Negative  --  Negative   Prot/Creat Ratio, Urine 0.47 H 1.55 H 0.58 H   - Urine p/c ratio acceptable      Management of kidney disease and related issues (HTN, anemia, SPTH, metabolic bone disease) should continue by community nephrologist.     Follow-up:   Annual follow-up with kidney transplant clinic per written guidelines.  Patient also reminded to follow-up with general nephrologist.    Labs: since patient remains at high  risk for rejection and drug-related complications that warrant close monitoring, labs will be ordered as follows: continue twice weekly CBC, renal panel, and drug level for first month; then same labs once weekly through 3rd month post-transplant.  Urine for UA and protein/creatinine ratio monthly.  Serum BK - PCR at 1-, 3-, 6-, 9-, 12-, 18-, 24-, 36-, 48-, and 60 months post-transplant.  Hepatic panel at 1-, 2-, 3-, 6-, 9-, 12-, 18-, 24-, and 36- months post-transplant.    Mary Kidd NP       Education:   Material provided to the patient.  Patient reminded to call with any health changes since these can be early signs of significant complications.  Also, I advised the patient to be sure any new medications or changes of old medications are discussed with either a pharmacist or physician knowledgeable with transplant to avoid rejection/drug toxicity related to significant drug interactions.    UNOS Patient Status  Functional Status: 80% - Normal activity with effort: some symptoms of disease  Physical Capacity: No Limitations

## 2022-05-04 NOTE — LETTER
May 4, 2022        Emre Latif  [address] [place] [address]  Phone: [phone]  Fax: [phone]             [facility] Trans[facility] 1st Fl  [address]  [facility] [address]  Phone: [phone]   Patient: Ibrahima Phelps   MR Number: [id number]   YOB: 1955   Date of Visit: 5/4/2022       Dear Dr. Emre Latif    Thank you for referring Ibrahima Phelps to me for evaluation. Attached you will find relevant portions of my assessment and plan of care.    If you have questions, please do not hesitate to call me. I look forward to following Ibrahima Phelps along with you.    Sincerely,    Mary Kidd, NP    Enclosure    If you would like to receive this communication electronically, please contact [email or web address] or [phone] to request [facility] [employer] access.    [facility] [employer] is a tool which provides read-only access to select patient information with whom you have a relationship. Its easy to use and provides real time access to review your patients record including encounter summaries, notes, results, and demographic information.    If you feel you have received this communication in error or would no longer like to receive these types of communications, please e-mail [email or web address]

## 2022-05-05 DIAGNOSIS — Z94.0 KIDNEY REPLACED BY TRANSPLANT: Primary | ICD-10-CM

## 2022-05-09 ENCOUNTER — TELEPHONE (OUTPATIENT)
Dept: TRANSPLANT | Facility: CLINIC | Age: 67
End: 2022-05-09
Payer: MEDICARE

## 2022-05-11 ENCOUNTER — PATIENT MESSAGE (OUTPATIENT)
Dept: RESEARCH | Facility: CLINIC | Age: 67
End: 2022-05-11
Payer: MEDICARE

## 2022-05-16 ENCOUNTER — PATIENT MESSAGE (OUTPATIENT)
Dept: NEPHROLOGY | Facility: CLINIC | Age: 67
End: 2022-05-16
Payer: MEDICARE

## 2022-05-16 DIAGNOSIS — Z94.0 KIDNEY REPLACED BY TRANSPLANT: Primary | ICD-10-CM

## 2022-05-20 ENCOUNTER — HOSPITAL ENCOUNTER (OUTPATIENT)
Dept: RADIOLOGY | Facility: HOSPITAL | Age: 67
Discharge: HOME | End: 2022-05-20
Attending: NURSE PRACTITIONER
Payer: MEDICARE

## 2022-05-20 ENCOUNTER — PATIENT MESSAGE (OUTPATIENT)
Dept: NEPHROLOGY | Facility: CLINIC | Age: 67
End: 2022-05-20
Payer: MEDICARE

## 2022-05-20 DIAGNOSIS — Z94.0 KIDNEY REPLACED BY TRANSPLANT: ICD-10-CM

## 2022-05-20 PROCEDURE — 76776 US TRANSPLANT KIDNEY WITH DOPPLER: ICD-10-PCS | Mod: 26,,, | Performed by: STUDENT IN AN ORGANIZED HEALTH CARE EDUCATION/TRAINING PROGRAM

## 2022-05-20 PROCEDURE — 76776 US EXAM K TRANSPL W/DOPPLER: CPT | Mod: TC

## 2022-05-20 PROCEDURE — 76776 US EXAM K TRANSPL W/DOPPLER: CPT | Mod: 26,,, | Performed by: STUDENT IN AN ORGANIZED HEALTH CARE EDUCATION/TRAINING PROGRAM

## 2022-05-20 NOTE — TELEPHONE ENCOUNTER
Patient is scheduled for June 29. He was called and reported being on a new medication called  Flecainide 100 mg after his cardioversion in March. He had his second covid booster and had mild shortness of breath after. Otherwise, he denies any illnesses.  He is scheduled for labs today for Dr. Lomeli. Please advise if new labs are needed before his return visit.

## 2022-05-30 ENCOUNTER — PATIENT MESSAGE (OUTPATIENT)
Dept: ADMINISTRATIVE | Facility: HOSPITAL | Age: 67
End: 2022-05-30
Payer: MEDICARE

## 2022-06-04 ENCOUNTER — HOSPITAL ENCOUNTER (OUTPATIENT)
Facility: HOSPITAL | Age: 67
Discharge: HOME | End: 2022-06-05
Attending: EMERGENCY MEDICINE | Admitting: EMERGENCY MEDICINE
Payer: MEDICARE

## 2022-06-04 DIAGNOSIS — R10.12 LUQ ABDOMINAL PAIN: Primary | ICD-10-CM

## 2022-06-04 DIAGNOSIS — Z94.0 H/O KIDNEY TRANSPLANT: ICD-10-CM

## 2022-06-04 DIAGNOSIS — D84.821 IMMUNODEFICIENCY DUE TO TREATMENT WITH IMMUNOSUPPRESSIVE MEDICATION: ICD-10-CM

## 2022-06-04 DIAGNOSIS — R07.9 CHEST PAIN: ICD-10-CM

## 2022-06-04 DIAGNOSIS — R79.89 ELEVATED TROPONIN: ICD-10-CM

## 2022-06-04 DIAGNOSIS — Z79.899 IMMUNODEFICIENCY DUE TO TREATMENT WITH IMMUNOSUPPRESSIVE MEDICATION: ICD-10-CM

## 2022-06-04 LAB
ALBUMIN SERPL BCP-MCNC: 3.5 G/DL (ref 3.5–5.2)
ALP SERPL-CCNC: 78 U/L (ref 55–135)
ALT SERPL W/O P-5'-P-CCNC: 20 U/L (ref 10–44)
ANION GAP SERPL CALC-SCNC: 15 MMOL/L (ref 8–16)
AST SERPL-CCNC: 19 U/L (ref 10–40)
BASOPHILS # BLD AUTO: 0.02 K/UL (ref 0–0.2)
BASOPHILS NFR BLD: 0.2 % (ref 0–1.9)
BILIRUB SERPL-MCNC: 0.4 MG/DL (ref 0.1–1)
BUN SERPL-MCNC: 33 MG/DL (ref 6–30)
BUN SERPL-MCNC: 35 MG/DL (ref 8–23)
CALCIUM SERPL-MCNC: 8.6 MG/DL (ref 8.7–10.5)
CHLORIDE SERPL-SCNC: 103 MMOL/L (ref 95–110)
CHLORIDE SERPL-SCNC: 104 MMOL/L (ref 95–110)
CO2 SERPL-SCNC: 22 MMOL/L (ref 23–29)
CREAT SERPL-MCNC: 2.9 MG/DL (ref 0.5–1.4)
CREAT SERPL-MCNC: 3.3 MG/DL (ref 0.5–1.4)
DIFFERENTIAL METHOD: ABNORMAL
EOSINOPHIL # BLD AUTO: 0.1 K/UL (ref 0–0.5)
EOSINOPHIL NFR BLD: 1.2 % (ref 0–8)
ERYTHROCYTE [DISTWIDTH] IN BLOOD BY AUTOMATED COUNT: 14.5 % (ref 11.5–14.5)
EST. GFR  (AFRICAN AMERICAN): 24.7 ML/MIN/1.73 M^2
EST. GFR  (NON AFRICAN AMERICAN): 21.4 ML/MIN/1.73 M^2
GLUCOSE SERPL-MCNC: 119 MG/DL (ref 70–110)
GLUCOSE SERPL-MCNC: 121 MG/DL (ref 70–110)
HCT VFR BLD AUTO: 31.1 % (ref 40–54)
HCT VFR BLD CALC: 29 %PCV (ref 36–54)
HGB BLD-MCNC: 9.7 G/DL (ref 14–18)
IMM GRANULOCYTES # BLD AUTO: 0.04 K/UL (ref 0–0.04)
IMM GRANULOCYTES NFR BLD AUTO: 0.4 % (ref 0–0.5)
LACTATE SERPL-SCNC: 1.1 MMOL/L (ref 0.5–2.2)
LIPASE SERPL-CCNC: 22 U/L (ref 4–60)
LYMPHOCYTES # BLD AUTO: 1.1 K/UL (ref 1–4.8)
LYMPHOCYTES NFR BLD: 10.1 % (ref 18–48)
MAGNESIUM SERPL-MCNC: 2.1 MG/DL (ref 1.6–2.6)
MCH RBC QN AUTO: 24.4 PG (ref 27–31)
MCHC RBC AUTO-ENTMCNC: 31.2 G/DL (ref 32–36)
MCV RBC AUTO: 78 FL (ref 82–98)
MONOCYTES # BLD AUTO: 1 K/UL (ref 0.3–1)
MONOCYTES NFR BLD: 9 % (ref 4–15)
NEUTROPHILS # BLD AUTO: 8.4 K/UL (ref 1.8–7.7)
NEUTROPHILS NFR BLD: 79.1 % (ref 38–73)
NRBC BLD-RTO: 0 /100 WBC
PHOSPHATE SERPL-MCNC: 3.6 MG/DL (ref 2.7–4.5)
PLATELET # BLD AUTO: 176 K/UL (ref 150–450)
PMV BLD AUTO: 12.2 FL (ref 9.2–12.9)
POC IONIZED CALCIUM: 1 MMOL/L (ref 1.06–1.42)
POC TCO2 (MEASURED): 26 MMOL/L (ref 23–29)
POTASSIUM BLD-SCNC: 4.5 MMOL/L (ref 3.5–5.1)
POTASSIUM SERPL-SCNC: 3.9 MMOL/L (ref 3.5–5.1)
PROT SERPL-MCNC: 7.2 G/DL (ref 6–8.4)
RBC # BLD AUTO: 3.98 M/UL (ref 4.6–6.2)
SAMPLE: ABNORMAL
SODIUM BLD-SCNC: 142 MMOL/L (ref 136–145)
SODIUM SERPL-SCNC: 141 MMOL/L (ref 136–145)
TROPONIN I SERPL DL<=0.01 NG/ML-MCNC: 0.03 NG/ML (ref 0–0.03)
WBC # BLD AUTO: 10.66 K/UL (ref 3.9–12.7)

## 2022-06-04 PROCEDURE — 63600175 PHARM REV CODE 636 W HCPCS: Performed by: STUDENT IN AN ORGANIZED HEALTH CARE EDUCATION/TRAINING PROGRAM

## 2022-06-04 PROCEDURE — 80053 COMPREHEN METABOLIC PANEL: CPT | Performed by: STUDENT IN AN ORGANIZED HEALTH CARE EDUCATION/TRAINING PROGRAM

## 2022-06-04 PROCEDURE — 86803 HEPATITIS C AB TEST: CPT | Performed by: EMERGENCY MEDICINE

## 2022-06-04 PROCEDURE — 83690 ASSAY OF LIPASE: CPT | Performed by: STUDENT IN AN ORGANIZED HEALTH CARE EDUCATION/TRAINING PROGRAM

## 2022-06-04 PROCEDURE — 83605 ASSAY OF LACTIC ACID: CPT | Performed by: STUDENT IN AN ORGANIZED HEALTH CARE EDUCATION/TRAINING PROGRAM

## 2022-06-04 PROCEDURE — 25000003 PHARM REV CODE 250: Performed by: STUDENT IN AN ORGANIZED HEALTH CARE EDUCATION/TRAINING PROGRAM

## 2022-06-04 PROCEDURE — 96375 TX/PRO/DX INJ NEW DRUG ADDON: CPT

## 2022-06-04 PROCEDURE — 84100 ASSAY OF PHOSPHORUS: CPT | Performed by: STUDENT IN AN ORGANIZED HEALTH CARE EDUCATION/TRAINING PROGRAM

## 2022-06-04 PROCEDURE — 83735 ASSAY OF MAGNESIUM: CPT | Performed by: STUDENT IN AN ORGANIZED HEALTH CARE EDUCATION/TRAINING PROGRAM

## 2022-06-04 PROCEDURE — 99285 PR EMERGENCY DEPT VISIT,LEVEL V: ICD-10-PCS | Mod: GC,CS,, | Performed by: EMERGENCY MEDICINE

## 2022-06-04 PROCEDURE — 82330 ASSAY OF CALCIUM: CPT

## 2022-06-04 PROCEDURE — 84484 ASSAY OF TROPONIN QUANT: CPT | Performed by: STUDENT IN AN ORGANIZED HEALTH CARE EDUCATION/TRAINING PROGRAM

## 2022-06-04 PROCEDURE — 99285 EMERGENCY DEPT VISIT HI MDM: CPT | Mod: 25,CS

## 2022-06-04 PROCEDURE — 80047 BASIC METABLC PNL IONIZED CA: CPT

## 2022-06-04 PROCEDURE — 85025 COMPLETE CBC W/AUTO DIFF WBC: CPT | Performed by: STUDENT IN AN ORGANIZED HEALTH CARE EDUCATION/TRAINING PROGRAM

## 2022-06-04 PROCEDURE — 96374 THER/PROPH/DIAG INJ IV PUSH: CPT

## 2022-06-04 PROCEDURE — 80197 ASSAY OF TACROLIMUS: CPT | Performed by: STUDENT IN AN ORGANIZED HEALTH CARE EDUCATION/TRAINING PROGRAM

## 2022-06-04 PROCEDURE — 99285 EMERGENCY DEPT VISIT HI MDM: CPT | Mod: GC,CS,, | Performed by: EMERGENCY MEDICINE

## 2022-06-04 RX ORDER — MORPHINE SULFATE 2 MG/ML
6 INJECTION, SOLUTION INTRAMUSCULAR; INTRAVENOUS
Status: COMPLETED | OUTPATIENT
Start: 2022-06-04 | End: 2022-06-04

## 2022-06-04 RX ORDER — ACETAMINOPHEN 500 MG
1000 TABLET ORAL
Status: COMPLETED | OUTPATIENT
Start: 2022-06-04 | End: 2022-06-04

## 2022-06-04 RX ORDER — ONDANSETRON 2 MG/ML
4 INJECTION INTRAMUSCULAR; INTRAVENOUS
Status: COMPLETED | OUTPATIENT
Start: 2022-06-04 | End: 2022-06-04

## 2022-06-04 RX ADMIN — ONDANSETRON 4 MG: 2 INJECTION, SOLUTION INTRAMUSCULAR; INTRAVENOUS at 09:06

## 2022-06-04 RX ADMIN — MORPHINE SULFATE 6 MG: 2 INJECTION, SOLUTION INTRAMUSCULAR; INTRAVENOUS at 09:06

## 2022-06-04 RX ADMIN — ACETAMINOPHEN 1000 MG: 500 TABLET ORAL at 09:06

## 2022-06-05 VITALS
WEIGHT: 242.94 LBS | BODY MASS INDEX: 32.2 KG/M2 | TEMPERATURE: 97 F | RESPIRATION RATE: 18 BRPM | HEART RATE: 55 BPM | HEIGHT: 73 IN | OXYGEN SATURATION: 95 % | SYSTOLIC BLOOD PRESSURE: 109 MMHG | DIASTOLIC BLOOD PRESSURE: 56 MMHG

## 2022-06-05 PROBLEM — R10.32 LEFT LOWER QUADRANT ABDOMINAL PAIN: Status: ACTIVE | Noted: 2022-06-05

## 2022-06-05 PROBLEM — D63.1 ANEMIA DUE TO STAGE 4 CHRONIC KIDNEY DISEASE: Status: ACTIVE | Noted: 2022-06-05

## 2022-06-05 PROBLEM — N18.4 ANEMIA DUE TO STAGE 4 CHRONIC KIDNEY DISEASE: Status: ACTIVE | Noted: 2022-06-05

## 2022-06-05 LAB
ALBUMIN SERPL BCP-MCNC: 3.5 G/DL (ref 3.5–5.2)
ALP SERPL-CCNC: 75 U/L (ref 55–135)
ALT SERPL W/O P-5'-P-CCNC: 23 U/L (ref 10–44)
ANION GAP SERPL CALC-SCNC: 13 MMOL/L (ref 8–16)
AST SERPL-CCNC: 22 U/L (ref 10–40)
BACTERIA #/AREA URNS AUTO: NORMAL /HPF
BASOPHILS # BLD AUTO: 0.02 K/UL (ref 0–0.2)
BASOPHILS NFR BLD: 0.2 % (ref 0–1.9)
BILIRUB SERPL-MCNC: 0.4 MG/DL (ref 0.1–1)
BILIRUB UR QL STRIP: NEGATIVE
BUN SERPL-MCNC: 35 MG/DL (ref 8–23)
CALCIUM SERPL-MCNC: 8.4 MG/DL (ref 8.7–10.5)
CHLORIDE SERPL-SCNC: 107 MMOL/L (ref 95–110)
CLARITY UR REFRACT.AUTO: CLEAR
CO2 SERPL-SCNC: 21 MMOL/L (ref 23–29)
COLOR UR AUTO: YELLOW
CREAT SERPL-MCNC: 2.8 MG/DL (ref 0.5–1.4)
CTP QC/QA: YES
DIFFERENTIAL METHOD: ABNORMAL
EOSINOPHIL # BLD AUTO: 0.1 K/UL (ref 0–0.5)
EOSINOPHIL NFR BLD: 0.9 % (ref 0–8)
ERYTHROCYTE [DISTWIDTH] IN BLOOD BY AUTOMATED COUNT: 14.8 % (ref 11.5–14.5)
EST. GFR  (AFRICAN AMERICAN): 25.8 ML/MIN/1.73 M^2
EST. GFR  (NON AFRICAN AMERICAN): 22.3 ML/MIN/1.73 M^2
ESTIMATED AVG GLUCOSE: 123 MG/DL (ref 68–131)
GLUCOSE SERPL-MCNC: 109 MG/DL (ref 70–110)
GLUCOSE UR QL STRIP: NEGATIVE
HBA1C MFR BLD: 5.9 % (ref 4–5.6)
HCT VFR BLD AUTO: 34.5 % (ref 40–54)
HGB BLD-MCNC: 10.8 G/DL (ref 14–18)
HGB UR QL STRIP: NEGATIVE
HYALINE CASTS UR QL AUTO: 0 /LPF
IMM GRANULOCYTES # BLD AUTO: 0.03 K/UL (ref 0–0.04)
IMM GRANULOCYTES NFR BLD AUTO: 0.3 % (ref 0–0.5)
KETONES UR QL STRIP: NEGATIVE
LEUKOCYTE ESTERASE UR QL STRIP: NEGATIVE
LYMPHOCYTES # BLD AUTO: 1.5 K/UL (ref 1–4.8)
LYMPHOCYTES NFR BLD: 12.9 % (ref 18–48)
MAGNESIUM SERPL-MCNC: 2.5 MG/DL (ref 1.6–2.6)
MCH RBC QN AUTO: 25.1 PG (ref 27–31)
MCHC RBC AUTO-ENTMCNC: 31.3 G/DL (ref 32–36)
MCV RBC AUTO: 80 FL (ref 82–98)
MICROSCOPIC COMMENT: NORMAL
MONOCYTES # BLD AUTO: 1 K/UL (ref 0.3–1)
MONOCYTES NFR BLD: 8.6 % (ref 4–15)
NEUTROPHILS # BLD AUTO: 8.8 K/UL (ref 1.8–7.7)
NEUTROPHILS NFR BLD: 77.1 % (ref 38–73)
NITRITE UR QL STRIP: NEGATIVE
NRBC BLD-RTO: 0 /100 WBC
PH UR STRIP: 6 [PH] (ref 5–8)
PHOSPHATE SERPL-MCNC: 4.5 MG/DL (ref 2.7–4.5)
PLATELET # BLD AUTO: 161 K/UL (ref 150–450)
PMV BLD AUTO: 13 FL (ref 9.2–12.9)
POCT GLUCOSE: 104 MG/DL (ref 70–110)
POCT GLUCOSE: 105 MG/DL (ref 70–110)
POCT GLUCOSE: 107 MG/DL (ref 70–110)
POTASSIUM SERPL-SCNC: 4.3 MMOL/L (ref 3.5–5.1)
PROT SERPL-MCNC: 6.9 G/DL (ref 6–8.4)
PROT UR QL STRIP: ABNORMAL
RBC # BLD AUTO: 4.3 M/UL (ref 4.6–6.2)
RBC #/AREA URNS AUTO: 2 /HPF (ref 0–4)
SARS-COV-2 RDRP RESP QL NAA+PROBE: NEGATIVE
SODIUM SERPL-SCNC: 141 MMOL/L (ref 136–145)
SP GR UR STRIP: 1.01 (ref 1–1.03)
TACROLIMUS BLD-MCNC: 4.2 NG/ML (ref 5–15)
TACROLIMUS BLD-MCNC: 5.1 NG/ML (ref 5–15)
TROPONIN I SERPL DL<=0.01 NG/ML-MCNC: 0.04 NG/ML (ref 0–0.03)
TROPONIN I SERPL DL<=0.01 NG/ML-MCNC: 0.05 NG/ML (ref 0–0.03)
URN SPEC COLLECT METH UR: ABNORMAL
WBC # BLD AUTO: 11.36 K/UL (ref 3.9–12.7)
WBC #/AREA URNS AUTO: 1 /HPF (ref 0–5)

## 2022-06-05 PROCEDURE — 93005 ELECTROCARDIOGRAM TRACING: CPT

## 2022-06-05 PROCEDURE — 84484 ASSAY OF TROPONIN QUANT: CPT | Mod: 91 | Performed by: PHYSICIAN ASSISTANT

## 2022-06-05 PROCEDURE — G0378 HOSPITAL OBSERVATION PER HR: HCPCS

## 2022-06-05 PROCEDURE — 81001 URINALYSIS AUTO W/SCOPE: CPT | Performed by: STUDENT IN AN ORGANIZED HEALTH CARE EDUCATION/TRAINING PROGRAM

## 2022-06-05 PROCEDURE — 84100 ASSAY OF PHOSPHORUS: CPT | Performed by: PHYSICIAN ASSISTANT

## 2022-06-05 PROCEDURE — 25000003 PHARM REV CODE 250: Performed by: PHYSICIAN ASSISTANT

## 2022-06-05 PROCEDURE — U0002 COVID-19 LAB TEST NON-CDC: HCPCS | Performed by: STUDENT IN AN ORGANIZED HEALTH CARE EDUCATION/TRAINING PROGRAM

## 2022-06-05 PROCEDURE — 80053 COMPREHEN METABOLIC PANEL: CPT | Performed by: PHYSICIAN ASSISTANT

## 2022-06-05 PROCEDURE — 83735 ASSAY OF MAGNESIUM: CPT | Performed by: PHYSICIAN ASSISTANT

## 2022-06-05 PROCEDURE — 80197 ASSAY OF TACROLIMUS: CPT | Performed by: PHYSICIAN ASSISTANT

## 2022-06-05 PROCEDURE — 93010 EKG 12-LEAD: ICD-10-PCS | Mod: ,,, | Performed by: INTERNAL MEDICINE

## 2022-06-05 PROCEDURE — 63600175 PHARM REV CODE 636 W HCPCS: Performed by: PHYSICIAN ASSISTANT

## 2022-06-05 PROCEDURE — 85025 COMPLETE CBC W/AUTO DIFF WBC: CPT | Performed by: PHYSICIAN ASSISTANT

## 2022-06-05 PROCEDURE — 83036 HEMOGLOBIN GLYCOSYLATED A1C: CPT | Performed by: PHYSICIAN ASSISTANT

## 2022-06-05 PROCEDURE — 99236 HOSP IP/OBS SAME DATE HI 85: CPT | Mod: ,,, | Performed by: PHYSICIAN ASSISTANT

## 2022-06-05 PROCEDURE — 63600175 PHARM REV CODE 636 W HCPCS: Performed by: EMERGENCY MEDICINE

## 2022-06-05 PROCEDURE — 36415 COLL VENOUS BLD VENIPUNCTURE: CPT | Performed by: PHYSICIAN ASSISTANT

## 2022-06-05 PROCEDURE — 96376 TX/PRO/DX INJ SAME DRUG ADON: CPT

## 2022-06-05 PROCEDURE — 93010 ELECTROCARDIOGRAM REPORT: CPT | Mod: ,,, | Performed by: INTERNAL MEDICINE

## 2022-06-05 PROCEDURE — 99236 PR OBSERV/HOSP SAME DATE,LEVL V: ICD-10-PCS | Mod: ,,, | Performed by: PHYSICIAN ASSISTANT

## 2022-06-05 RX ORDER — GABAPENTIN 300 MG/1
300 CAPSULE ORAL 2 TIMES DAILY
Status: DISCONTINUED | OUTPATIENT
Start: 2022-06-05 | End: 2022-06-05 | Stop reason: HOSPADM

## 2022-06-05 RX ORDER — ONDANSETRON 2 MG/ML
4 INJECTION INTRAMUSCULAR; INTRAVENOUS EVERY 8 HOURS PRN
Status: DISCONTINUED | OUTPATIENT
Start: 2022-06-05 | End: 2022-06-05 | Stop reason: HOSPADM

## 2022-06-05 RX ORDER — DOXAZOSIN 2 MG/1
4 TABLET ORAL EVERY 12 HOURS
Status: DISCONTINUED | OUTPATIENT
Start: 2022-06-05 | End: 2022-06-05 | Stop reason: HOSPADM

## 2022-06-05 RX ORDER — METOPROLOL SUCCINATE 25 MG/1
25 TABLET, EXTENDED RELEASE ORAL DAILY
Status: DISCONTINUED | OUTPATIENT
Start: 2022-06-05 | End: 2022-06-05 | Stop reason: HOSPADM

## 2022-06-05 RX ORDER — SODIUM CHLORIDE 0.9 % (FLUSH) 0.9 %
10 SYRINGE (ML) INJECTION
Status: DISCONTINUED | OUTPATIENT
Start: 2022-06-05 | End: 2022-06-05 | Stop reason: HOSPADM

## 2022-06-05 RX ORDER — TALC
6 POWDER (GRAM) TOPICAL NIGHTLY PRN
Status: DISCONTINUED | OUTPATIENT
Start: 2022-06-05 | End: 2022-06-05 | Stop reason: HOSPADM

## 2022-06-05 RX ORDER — PROCHLORPERAZINE EDISYLATE 5 MG/ML
5 INJECTION INTRAMUSCULAR; INTRAVENOUS EVERY 6 HOURS PRN
Status: DISCONTINUED | OUTPATIENT
Start: 2022-06-05 | End: 2022-06-05 | Stop reason: HOSPADM

## 2022-06-05 RX ORDER — MORPHINE SULFATE 2 MG/ML
6 INJECTION, SOLUTION INTRAMUSCULAR; INTRAVENOUS
Status: COMPLETED | OUTPATIENT
Start: 2022-06-05 | End: 2022-06-05

## 2022-06-05 RX ORDER — HYDRALAZINE HYDROCHLORIDE 50 MG/1
50 TABLET, FILM COATED ORAL 3 TIMES DAILY
Status: DISCONTINUED | OUTPATIENT
Start: 2022-06-05 | End: 2022-06-05 | Stop reason: HOSPADM

## 2022-06-05 RX ORDER — METHOCARBAMOL 500 MG/1
500 TABLET, FILM COATED ORAL 3 TIMES DAILY PRN
Status: DISCONTINUED | OUTPATIENT
Start: 2022-06-05 | End: 2022-06-05 | Stop reason: HOSPADM

## 2022-06-05 RX ORDER — FAMOTIDINE 20 MG/1
20 TABLET, FILM COATED ORAL 2 TIMES DAILY
Status: DISCONTINUED | OUTPATIENT
Start: 2022-06-05 | End: 2022-06-05 | Stop reason: HOSPADM

## 2022-06-05 RX ORDER — FLECAINIDE ACETATE 100 MG/1
100 TABLET ORAL EVERY 12 HOURS
Status: DISCONTINUED | OUTPATIENT
Start: 2022-06-05 | End: 2022-06-05 | Stop reason: HOSPADM

## 2022-06-05 RX ORDER — NIFEDIPINE 30 MG/1
60 TABLET, EXTENDED RELEASE ORAL 2 TIMES DAILY
Status: DISCONTINUED | OUTPATIENT
Start: 2022-06-05 | End: 2022-06-05 | Stop reason: HOSPADM

## 2022-06-05 RX ORDER — IBUPROFEN 200 MG
16 TABLET ORAL
Status: DISCONTINUED | OUTPATIENT
Start: 2022-06-05 | End: 2022-06-05 | Stop reason: HOSPADM

## 2022-06-05 RX ORDER — IPRATROPIUM BROMIDE AND ALBUTEROL SULFATE 2.5; .5 MG/3ML; MG/3ML
3 SOLUTION RESPIRATORY (INHALATION) EVERY 4 HOURS PRN
Status: DISCONTINUED | OUTPATIENT
Start: 2022-06-05 | End: 2022-06-05 | Stop reason: HOSPADM

## 2022-06-05 RX ORDER — HYDROCODONE BITARTRATE AND ACETAMINOPHEN 5; 325 MG/1; MG/1
1 TABLET ORAL EVERY 6 HOURS PRN
Status: DISCONTINUED | OUTPATIENT
Start: 2022-06-05 | End: 2022-06-05 | Stop reason: HOSPADM

## 2022-06-05 RX ORDER — MYCOPHENOLATE MOFETIL 250 MG/1
500 CAPSULE ORAL 2 TIMES DAILY
Status: DISCONTINUED | OUTPATIENT
Start: 2022-06-05 | End: 2022-06-05 | Stop reason: HOSPADM

## 2022-06-05 RX ORDER — TACROLIMUS 1 MG/1
2 CAPSULE ORAL 2 TIMES DAILY
Status: DISCONTINUED | OUTPATIENT
Start: 2022-06-05 | End: 2022-06-05 | Stop reason: HOSPADM

## 2022-06-05 RX ORDER — PARICALCITOL 1 UG/1
1 CAPSULE, LIQUID FILLED ORAL
Status: DISCONTINUED | OUTPATIENT
Start: 2022-06-06 | End: 2022-06-05 | Stop reason: HOSPADM

## 2022-06-05 RX ORDER — POLYETHYLENE GLYCOL 3350 17 G/17G
17 POWDER, FOR SOLUTION ORAL DAILY PRN
Status: DISCONTINUED | OUTPATIENT
Start: 2022-06-05 | End: 2022-06-05 | Stop reason: HOSPADM

## 2022-06-05 RX ORDER — SPIRONOLACTONE 25 MG/1
25 TABLET ORAL DAILY
Status: DISCONTINUED | OUTPATIENT
Start: 2022-06-05 | End: 2022-06-05

## 2022-06-05 RX ORDER — IBUPROFEN 200 MG
24 TABLET ORAL
Status: DISCONTINUED | OUTPATIENT
Start: 2022-06-05 | End: 2022-06-05 | Stop reason: HOSPADM

## 2022-06-05 RX ORDER — BISACODYL 10 MG
10 SUPPOSITORY, RECTAL RECTAL DAILY PRN
Status: DISCONTINUED | OUTPATIENT
Start: 2022-06-05 | End: 2022-06-05 | Stop reason: HOSPADM

## 2022-06-05 RX ORDER — PREDNISONE 5 MG/1
5 TABLET ORAL DAILY
Status: DISCONTINUED | OUTPATIENT
Start: 2022-06-05 | End: 2022-06-05 | Stop reason: HOSPADM

## 2022-06-05 RX ORDER — ACETAMINOPHEN 325 MG/1
650 TABLET ORAL EVERY 4 HOURS PRN
Status: DISCONTINUED | OUTPATIENT
Start: 2022-06-05 | End: 2022-06-05 | Stop reason: HOSPADM

## 2022-06-05 RX ORDER — GLUCAGON 1 MG
1 KIT INJECTION
Status: DISCONTINUED | OUTPATIENT
Start: 2022-06-05 | End: 2022-06-05 | Stop reason: HOSPADM

## 2022-06-05 RX ORDER — HEPARIN SODIUM 5000 [USP'U]/ML
5000 INJECTION, SOLUTION INTRAVENOUS; SUBCUTANEOUS EVERY 8 HOURS
Status: DISCONTINUED | OUTPATIENT
Start: 2022-06-05 | End: 2022-06-05

## 2022-06-05 RX ORDER — HYDROCODONE BITARTRATE AND ACETAMINOPHEN 7.5; 325 MG/1; MG/1
1 TABLET ORAL EVERY 6 HOURS PRN
Status: DISCONTINUED | OUTPATIENT
Start: 2022-06-05 | End: 2022-06-05 | Stop reason: HOSPADM

## 2022-06-05 RX ORDER — ATORVASTATIN CALCIUM 20 MG/1
20 TABLET, FILM COATED ORAL DAILY
Status: DISCONTINUED | OUTPATIENT
Start: 2022-06-05 | End: 2022-06-05 | Stop reason: HOSPADM

## 2022-06-05 RX ADMIN — MORPHINE SULFATE 6 MG: 2 INJECTION, SOLUTION INTRAMUSCULAR; INTRAVENOUS at 01:06

## 2022-06-05 RX ADMIN — APIXABAN 5 MG: 5 TABLET, FILM COATED ORAL at 08:06

## 2022-06-05 RX ADMIN — TACROLIMUS 2 MG: 1 CAPSULE ORAL at 08:06

## 2022-06-05 RX ADMIN — METHOCARBAMOL 500 MG: 500 TABLET ORAL at 04:06

## 2022-06-05 RX ADMIN — FAMOTIDINE 20 MG: 20 TABLET, FILM COATED ORAL at 08:06

## 2022-06-05 RX ADMIN — METOPROLOL SUCCINATE 25 MG: 25 TABLET, EXTENDED RELEASE ORAL at 08:06

## 2022-06-05 RX ADMIN — DOXAZOSIN 4 MG: 2 TABLET ORAL at 08:06

## 2022-06-05 RX ADMIN — NIFEDIPINE 60 MG: 30 TABLET, FILM COATED, EXTENDED RELEASE ORAL at 03:06

## 2022-06-05 RX ADMIN — MYCOPHENOLATE MOFETIL 500 MG: 250 CAPSULE ORAL at 08:06

## 2022-06-05 RX ADMIN — PREDNISONE 5 MG: 5 TABLET ORAL at 08:06

## 2022-06-05 RX ADMIN — ONDANSETRON 4 MG: 2 INJECTION INTRAMUSCULAR; INTRAVENOUS at 03:06

## 2022-06-05 RX ADMIN — ATORVASTATIN CALCIUM 20 MG: 20 TABLET, FILM COATED ORAL at 08:06

## 2022-06-05 RX ADMIN — GABAPENTIN 300 MG: 300 CAPSULE ORAL at 08:06

## 2022-06-05 RX ADMIN — NIFEDIPINE 60 MG: 30 TABLET, FILM COATED, EXTENDED RELEASE ORAL at 08:06

## 2022-06-05 RX ADMIN — FLECAINIDE ACETATE 100 MG: 100 TABLET ORAL at 08:06

## 2022-06-05 RX ADMIN — HYDROCODONE BITARTRATE AND ACETAMINOPHEN 1 TABLET: 7.5; 325 TABLET ORAL at 04:06

## 2022-06-05 RX ADMIN — HYDRALAZINE HYDROCHLORIDE 50 MG: 50 TABLET ORAL at 08:06

## 2022-06-05 NOTE — ASSESSMENT & PLAN NOTE
Chronic kidney disease, stage 4 (severe)  - Cr 2.9, baseline ~2.5,butrecently uptrending and around 3.3. suspect progression of CKD  - gentle IVFs  - follow up UA  - avoid nephrotoxins, renally dose meds

## 2022-06-05 NOTE — ASSESSMENT & PLAN NOTE
- Trop 0.029, improved from baseline ~0.05  - no reported chest pain  - EKG without acute ST/T wave changes  - recent nuclear stress (4/2022) negative for ischemia  - trend trop

## 2022-06-05 NOTE — ASSESSMENT & PLAN NOTE
- Trop 0.029> 0.05,baseline ~0.05  - no reported chest pain  - EKG without acute ST/T wave changes  - recent nuclear stress (4/2022) negative for ischemia  - no evidence of ACS

## 2022-06-05 NOTE — RESPIRATORY THERAPY
RAPID RESPONSE RESPIRATORY CHART CHECK       Chart check completed, call [phone] for further concerns or assistance.

## 2022-06-05 NOTE — HOSPITAL COURSE
Mr. Phelps was admitted to observation for LLQ abdominal pain. Afebrile without leukocytosis. Cr 2.9 on admit, given small IVF with repeat Cr 2.8, appears close to baseline and likely progression of CKD. KTM consulted, no evidence of transplant rejection. Abdominal pain resolved without intervention, unclear cause. Troponin 0.02>0.05, at baseline, EKG without acute ischemia. Patient endorsing fatigue and DAUGHERTY since starting flecainide. No evidence of hypervolemia, CXR clear, no oxygen requirements. Has follow up with cardiology in 3 days. Patient discharged, return instructions given. Patient and wife verbalized understanding. All questions answered.

## 2022-06-05 NOTE — ASSESSMENT & PLAN NOTE
- afebrile without leukocytosis  - LFTs, lipase, lactate WNL  - CTRSS without etiology of symptoms  - presentation possible due to transplant rejection?  - KTS consulted; appreciate recs  - follow UA  - IVFs  - pain control  - PRN anti-emetics

## 2022-06-05 NOTE — DISCHARGE SUMMARY
"[facility] - Telemetry Stepdown ([place] [address])  [facility] Medicine  Discharge Summary      Patient Name: Ibrahima Phelps  MRN: [id number]  Patient Class: OP- Observation  Admission Date: 6/4/2022  Hospital Length of Stay: 0 days  Discharge Date and Time: 6/5/2022  4:58 PM  Attending Physician: Nadira Walters MD   Discharging Provider: Elvia Snell PA-C  Primary Care Provider: Connie Magdaleno MD  [facility] Medicine Team: [facility] E Elvia Snell PA-C    HPI:   Ibrahima Phelps is a 66 y.o. male with a PMHx of kidney transplant (2005), atrial fibrillation, CHF, CAD s/p stents who presented to the ED complaining of abdominal pain. Patient reports constant left upper and lower "achy" abdominal pain began earlier today. Pain does not radiate. No aggravating or alleviating factors. Denies fever, chills, nausea, vomiting, diarrhea, constipation, dysuria, hematura, or dark/bloody stools. His last bowel movement was earlier today and normal. Endorses compliance with tacrolimus. He also reports decreased urine output today without the urge to urinate.     ED: AFVSS. No leukocytosis. Labs notable for KATINA (Cr 2.9 from baseline ~2.5). CTRSS without etiology of symptoms. US transplanted kidney shows elevated arterial resistive indices may be seen in setting of transplant rejection, drug toxicity or medical renal disease.          * No surgery found *      Hospital Course:   Mr. Phelps was admitted to observation for LLQ abdominal pain. Afebrile without leukocytosis. Cr 2.9 on admit, given small IVF with repeat Cr 2.8, appears close to baseline and likely progression of CKD. KTM consulted, no evidence of transplant rejection. Abdominal pain resolved without intervention, unclear cause. Troponin 0.02>0.05, at baseline, EKG without acute ischemia. Patient endorsing fatigue and DAUGHERTY since starting flecainide. No evidence of hypervolemia, CXR clear, no oxygen requirements. Has follow up with cardiology in 3 days. Patient discharged, " return instructions given. Patient and wife verbalized understanding. All questions answered.       Goals of Care Treatment Preferences:  Code Status: Full Code      Consults:   Consults (From admission, onward)          Status Ordering Provider     Inpatient consult to Kidney/Pancreas Transplant Medicine  Once        Provider:  (Not yet assigned)    Acknowledged MACO BRIONES            * Left lower quadrant abdominal pain  - afebrile without leukocytosis  - LFTs, lipase, lactate WNL  - CTRSS without etiology of symptoms  - presentation possible due to transplant rejection?  - KTS consulted; appreciate recs--> no evidence of transplant rejection   - UA negative for infection  - IVFs  - abdominal pain resolved, unclear etiology  - return precautions given    KATINA (acute kidney injury)  Chronic kidney disease, stage 4 (severe)  - Cr 2.9, baseline ~2.5,butrecently uptrending and around 3.3. suspect progression of CKD  - gentle IVFs  - follow up UA  - avoid nephrotoxins, renally dose meds    Elevated troponin  - Trop 0.029> 0.05,baseline ~0.05  - no reported chest pain  - EKG without acute ST/T wave changes  - recent nuclear stress (4/2022) negative for ischemia  - no evidence of ACS      Final Active Diagnoses:    Diagnosis Date Noted POA    PRINCIPAL PROBLEM:  Left lower quadrant abdominal pain [R10.32] 06/05/2022 Yes    Anemia due to stage 4 chronic kidney disease [N18.4, D63.1] 06/05/2022 Yes    Chronic kidney disease, stage 4 (severe) [N18.4] 01/31/2022 Yes    Kidney replaced by transplant [Z94.0] 04/26/2021 Not Applicable    Chronic diastolic heart failure [I50.32] 04/08/2020 Yes    Atrial fibrillation [I48.91] 09/25/2017 Yes    KATINA (acute kidney injury) [N17.9] 01/25/2017 Yes    Elevated troponin [R77.8] 01/22/2017 Yes    Immunodeficiency due to treatment with immunosuppressive medication [D84.821, Z79.899]  Not Applicable    Mixed hyperlipidemia [E78.2]  Yes    Hypertension [I10]  Yes      Problems  Resolved During this Admission:       Discharged Condition: good    Disposition: Home or Self Care    Follow Up:   Follow-up Information       Connie Magdaleno MD Follow up in 1 week(s).    Specialty: Internal Medicine  Contact information:  [address]  [facility] [address] [phone]                           Patient Instructions:      Ambulatory referral/consult to Neurology   Standing Status: Future   Referral Priority: Routine Referral Type: Consultation   Referral Reason: Specialty Services Required   Requested Specialty: Neurology   Number of Visits Requested: 1     Diet Cardiac     Notify your health care provider if you experience any of the following:  temperature >100.4     Notify your health care provider if you experience any of the following:  severe uncontrolled pain     Notify your health care provider if you experience any of the following:  persistent nausea and vomiting or diarrhea     Activity as tolerated       Significant Diagnostic Studies: Labs:   CMP   Recent Labs   Lab 06/04/22 2112 06/05/22 0310    141   K 3.9 4.3    107   CO2 22* 21*   * 109   BUN 35* 35*   CREATININE 2.9* 2.8*   CALCIUM 8.6* 8.4*   PROT 7.2 6.9   ALBUMIN 3.5 3.5   BILITOT 0.4 0.4   ALKPHOS 78 75   AST 19 22   ALT 20 23   ANIONGAP 15 13   ESTGFRAFRICA 24.7* 25.8*   EGFRNONAA 21.4* 22.3*    and CBC   Recent Labs   Lab 06/04/22 2112 06/04/22 2120 06/05/22 0310   WBC 10.66  --  11.36   HGB 9.7*  --  10.8*   HCT 31.1*   < > 34.5*     --  161    < > = values in this interval not displayed.       Pending Diagnostic Studies:       Procedure Component Value Units Date/Time    Hepatitis C Antibody [[id number]] Collected: 06/04/22 2112    Order Status: Sent Lab Status: In process Updated: 06/04/22 2119    Specimen: Blood            Medications:  Reconciled Home Medications:      Medication List        CONTINUE taking these medications      albuterol 90 mcg/actuation inhaler  Commonly known as:  VENTOLIN HFA  Inhale 2 puffs into the lungs every 6 (six) hours as needed for Wheezing or Shortness of Breath. Rescue     ammonium lactate 12 % Crea  Apply 1 g topically once daily.     apixaban 5 mg Tab  Commonly known as: ELIQUIS  Take 1 tablet (5 mg total) by mouth 2 (two) times daily.     aspirin 81 MG EC tablet  Commonly known as: ECOTRIN  Take 1 tablet by mouth Daily.     atorvastatin 20 MG tablet  Commonly known as: LIPITOR  Take 1 tablet (20 mg total) by mouth once daily.     calcium carbonate 500 mg calcium (1,250 mg) tablet  Commonly known as: OS-TRISH  TAKE 2 TABLETS BY MOUTH TWICE A DAY     doxazosin 4 MG tablet  Commonly known as: CARDURA  TAKE 1 TABLET (4 MG TOTAL) BY MOUTH EVERY 12 (TWELVE) HOURS.     famotidine 20 MG tablet  Commonly known as: PEPCID  Take 1 tablet (20 mg total) by mouth 2 (two) times daily.     fexofenadine 60 MG tablet  Commonly known as: ALLEGRA  Take 1 tablet by mouth Twice daily as needed.     flecainide 100 MG Tab  Commonly known as: TAMBOCOR  Take 1 tablet (100 mg total) by mouth every 12 (twelve) hours.     fluticasone propionate 50 mcg/actuation nasal spray  Commonly known as: FLONASE  1 spray (50 mcg total) by Each Nostril route once daily.     furosemide 40 MG tablet  Commonly known as: LASIX  Take 0.5 tablets (20 mg total) by mouth 2 (two) times daily.     gabapentin 300 MG capsule  Commonly known as: NEURONTIN  TAKE 1 CAPSULE BY MOUTH TWICE A DAY     hydrALAZINE 50 MG tablet  Commonly known as: APRESOLINE  Take 1 tablet (50 mg total) by mouth 3 (three) times daily.     metoprolol succinate 25 MG 24 hr tablet  Commonly known as: TOPROL-XL  Take 1 tablet (25 mg total) by mouth once daily.     multivitamin per tablet  Commonly known as: THERAGRAN  Take 1 tablet by mouth Daily.     mycophenolate 250 mg Cap  Commonly known as: CELLCEPT  Take 2 capsules (500 mg total) by mouth 2 (two) times daily.     nicotine 21 mg/24 hr  Commonly known as: NICODERM CQ  Place 1 patch onto the  skin once daily.     NIFEdipine 60 MG (OSM) 24 hr tablet  Commonly known as: PROCARDIA-XL  Take 1 tablet (60 mg total) by mouth 2 (two) times a day.     paricalcitoL 1 MCG capsule  Commonly known as: ZEMPLAR  TAKE 1 TABLET BY MOUTH ON MONDAY, WEDNESDAY, & FRIDAY     potassium chloride 10 MEQ Tbsr  Commonly known as: KLOR-CON 10  Take 1 tablet (10 mEq total) by mouth once daily.     predniSONE 5 MG tablet  Commonly known as: DELTASONE  TAKE 1 TABLET BY MOUTH EVERY DAY IN THE MORNING     spironolactone 25 MG tablet  Commonly known as: ALDACTONE  Take 1 tablet (25 mg total) by mouth once daily.     tacrolimus 1 MG Cap  Commonly known as: PROGRAF  Two po in am and two po in pm     vitamin D 1000 units Tab  Commonly known as: VITAMIN D3  Take 370 mg by mouth 2 (two) times daily. 2 tablets BID              Indwelling Lines/Drains at time of discharge:   Lines/Drains/Airways       None                   Time spent on the discharge of patient: 36 minutes         Elvia Snell PA-C  Department of Hospital Medicine  [facility] - Telemetry Stepdown ([place]-[facility])

## 2022-06-05 NOTE — SUBJECTIVE & OBJECTIVE
"Past Medical History:   Diagnosis Date    (HFpEF) heart failure with preserved ejection fraction 9/25/2017    Atrial fibrillation     CAD (coronary artery disease)     CHF (congestive heart failure)     Chronic diastolic heart failure 4/8/2020    CKD (chronic kidney disease) stage 3, GFR 30-59 ml/min     Dr. Latif    Diverticulosis     Fever blister     Heart attack 2006    Hyperlipidemia     Hypertension     Immunodeficiency due to treatment with immunosuppressive medication     Keloid cicatrix     Metabolic bone disease     Pericarditis     S/P kidney transplant     Supraventricular tachycardia 06/2019    Thrombocytopenia     Thyroid disease     Unspecified disorder of kidney and ureter     Stage IIICKD       Past Surgical History:   Procedure Laterality Date    CARDIOVERSION  04/30/15    CHOLECYSTECTOMY      COLONOSCOPY  April 20, 2011    Diverticulosis, repeat recommended in 3 yrs., repeat colonoscopy 2014 revealed 2 polyps.  He should return in 5 years.    COLONOSCOPY N/A 3/13/2020    Procedure: COLONOSCOPY;  Surgeon: Chon Casper MD;  [facility]  Service: Endoscopy;  Laterality: N/A;  ok to hold coumadin x5days-see telephone encounter 2/4/20-tb    COLONOSCOPY N/A 2/4/2021    Procedure: COLONOSCOPY;  Surgeon: Chon Casper MD;  Location: [facility] (4TH FLR);  Service: Endoscopy;  Laterality: N/A;  Eliquis - per Dr. GAVIN chua to hold x 2 days and "restarted asap"- ERW  last prep "poor", [id number] ordered/ Pt requests Dr. Casper  prep ins. mailed - COVID screening on 2/1/21 PCW- ERW    COLONOSCOPY N/A 3/3/2021    Procedure: COLONOSCOPY;  Surgeon: Chon Casper MD;  [facility]  Service: Endoscopy;  Laterality: N/A;  Patient with his second poor bowel pre and has poor prep today.  If patient not intersted or can't get colonoscopy tomorrow will need constipation bowel prep and will need to restart his Eliquis today.Thanks,Chon Blunt-ok to hold " Eliquis 2 days prior      COVID     CORONARY ANGIOPLASTY WITH STENT PLACEMENT  9/13/2006    KIDNEY TRANSPLANT  2005    PARATHYROIDECTOMY      TREATMENT OF CARDIAC ARRHYTHMIA N/A 3/28/2022    Procedure: CARDIOVERSION;  Surgeon: Migue Blunt MD;  Location[facility];  Service: Cardiology;  Laterality: N/A;  AF, DCC, MAC, GP, 3 PREP*RODRIGO deferred pt 100% compliant*       Review of patient's allergies indicates:   Allergen Reactions    Ace inhibitors Swelling    Verapamil Other (See Comments)     Other reaction(s): Unknown    Povidone-iodine Itching       No current facility-administered medications on file prior to encounter.     Current Outpatient Medications on File Prior to Encounter   Medication Sig    albuterol (VENTOLIN HFA) 90 mcg/actuation inhaler Inhale 2 puffs into the lungs every 6 (six) hours as needed for Wheezing or Shortness of Breath. Rescue    ammonium lactate 12 % Crea Apply 1 g topically once daily.    apixaban (ELIQUIS) 5 mg Tab Take 1 tablet (5 mg total) by mouth 2 (two) times daily.    aspirin (ECOTRIN) 81 MG EC tablet Take 1 tablet by mouth Daily.    atorvastatin (LIPITOR) 20 MG tablet Take 1 tablet (20 mg total) by mouth once daily.    calcium carbonate (OS-TRISH) 500 mg calcium (1,250 mg) tablet TAKE 2 TABLETS BY MOUTH TWICE A DAY    doxazosin (CARDURA) 4 MG tablet TAKE 1 TABLET (4 MG TOTAL) BY MOUTH EVERY 12 (TWELVE) HOURS.    famotidine (PEPCID) 20 MG tablet Take 1 tablet (20 mg total) by mouth 2 (two) times daily.    fexofenadine (ALLEGRA) 60 MG tablet Take 1 tablet by mouth Twice daily as needed.    flecainide (TAMBOCOR) 100 MG Tab Take 1 tablet (100 mg total) by mouth every 12 (twelve) hours.    fluticasone propionate (FLONASE) 50 mcg/actuation nasal spray 1 spray (50 mcg total) by Each Nostril route once daily.    furosemide (LASIX) 40 MG tablet Take 0.5 tablets (20 mg total) by mouth 2 (two) times daily.    gabapentin (NEURONTIN) 300 MG capsule TAKE 1 CAPSULE BY MOUTH TWICE A DAY     hydrALAZINE (APRESOLINE) 50 MG tablet Take 1 tablet (50 mg total) by mouth 3 (three) times daily.    metoprolol succinate (TOPROL-XL) 25 MG 24 hr tablet Take 1 tablet (25 mg total) by mouth once daily.    multivitamin (THERAGRAN) per tablet Take 1 tablet by mouth Daily.    mycophenolate (CELLCEPT) 250 mg Cap Take 2 capsules (500 mg total) by mouth 2 (two) times daily.    nicotine (NICODERM CQ) 21 mg/24 hr Place 1 patch onto the skin once daily.    NIFEdipine (PROCARDIA-XL) 60 MG (OSM) 24 hr tablet Take 1 tablet (60 mg total) by mouth 2 (two) times a day.    paricalcitoL (ZEMPLAR) 1 MCG capsule TAKE 1 TABLET BY MOUTH ON MONDAY, WEDNESDAY, & FRIDAY    potassium chloride (KLOR-CON 10) 10 MEQ TbSR Take 1 tablet (10 mEq total) by mouth once daily.    predniSONE (DELTASONE) 5 MG tablet TAKE 1 TABLET BY MOUTH EVERY DAY IN THE MORNING    spironolactone (ALDACTONE) 25 MG tablet Take 1 tablet (25 mg total) by mouth once daily.    tacrolimus (PROGRAF) 1 MG Cap Two po in am and two po in pm    vitamin D 1000 units Tab Take 370 mg by mouth 2 (two) times daily. 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   Substance and Sexual Activity    Alcohol use: No    Drug use: No    Sexual activity: Yes     Partners: Female     Review of Systems   Constitutional:  Negative for activity change, chills and fever.   HENT:  Negative for congestion, trouble swallowing and voice change.    Eyes:  Negative for photophobia and visual  disturbance.   Respiratory:  Negative for chest tightness, shortness of breath and wheezing.    Cardiovascular:  Negative for chest pain, palpitations and leg swelling.   Gastrointestinal:  Positive for abdominal pain. Negative for constipation, diarrhea, nausea and vomiting.   Genitourinary:  Positive for flank pain. Negative for dysuria, frequency, hematuria and urgency.   Musculoskeletal:  Negative for arthralgias, back pain and gait problem.   Skin:  Negative for color change and rash.   Neurological:  Negative for dizziness, syncope, weakness, light-headedness, numbness and headaches.   Psychiatric/Behavioral:  Negative for agitation and confusion. The patient is not nervous/anxious.    Objective:     Vital Signs (Most Recent):  Temp: 98.5 °F (36.9 °C) (06/04/22 2044)  Pulse: (!) 59 (06/04/22 2300)  Resp: 17 (06/05/22 0104)  BP: (!) 145/67 (06/04/22 2300)  SpO2: 95 % (06/04/22 2300)   Vital Signs (24h Range):  Temp:  [98.5 °F (36.9 °C)] 98.5 °F (36.9 °C)  Pulse:  [59-66] 59  Resp:  [17-20] 17  SpO2:  [95 %-99 %] 95 %  BP: (145-148)/(67) 145/67     Weight: 110.2 kg (243 lb)  Body mass index is 32.06 kg/m².    Physical Exam  Vitals and nursing note reviewed.   Constitutional:       General: He is not in acute distress.     Appearance: He is well-developed. He is obese.   HENT:      Head: Normocephalic and atraumatic.      Mouth/Throat:      Pharynx: No oropharyngeal exudate.   Eyes:      General: No scleral icterus.     Extraocular Movements: Extraocular movements intact.      Conjunctiva/sclera: Conjunctivae normal.   Cardiovascular:      Rate and Rhythm: Normal rate and regular rhythm.      Heart sounds: Normal heart sounds.   Pulmonary:      Effort: Pulmonary effort is normal. No respiratory distress.      Breath sounds: Normal breath sounds. No wheezing.   Abdominal:      General: Bowel sounds are normal. There is no distension.      Palpations: Abdomen is soft.      Tenderness: There is abdominal tenderness  in the left lower quadrant.   Musculoskeletal:         General: No tenderness. Normal range of motion.      Cervical back: Normal range of motion and neck supple.   Lymphadenopathy:      Cervical: No cervical adenopathy.   Skin:     General: Skin is warm and dry.      Capillary Refill: Capillary refill takes less than 2 seconds.      Findings: No rash.   Neurological:      Mental Status: He is alert and oriented to person, place, and time.      Cranial Nerves: No cranial nerve deficit.      Sensory: No sensory deficit.      Coordination: Coordination normal.   Psychiatric:         Behavior: Behavior normal.         Thought Content: Thought content normal.         Judgment: Judgment normal.           Significant Labs: All pertinent labs within the past 24 hours have been reviewed.  BMP:   Recent Labs   Lab 06/04/22 2112   *      K 3.9      CO2 22*   BUN 35*   CREATININE 2.9*   CALCIUM 8.6*   MG 2.1     CBC:   Recent Labs   Lab 06/04/22 2112 06/04/22 2120   WBC 10.66  --    HGB 9.7*  --    HCT 31.1* 29*     --        Significant Imaging: I have reviewed all pertinent imaging results/findings within the past 24 hours.  US Transplant Kidney With Doppler  Narrative: EXAMINATION:  US TRANSPLANT KIDNEY WITH DOPPLER    CLINICAL HISTORY:  LUQ/LLQ severe pain, hx transplant;    TECHNIQUE:  Real time gray scale and doppler ultrasound was performed over the patient's renal allograft.    COMPARISON:  CT renal stone study 06/04/2022    Renal transplant ultrasound 05/20/2022    FINDINGS:  Renal allograft in the left lower quadrant.  The allograft measures 11.5 cm. Normal perfusion. No hydronephrosis.    No fluid collections.    Vasculature:    Resistive indices are as follow:    Interlobar artery: 1.0 (previously 0.8)    Upper segmental: 0.83 (previously 0.81)    Mid segmental: 0.83 (previously 0.88)    Lower segmental: 0.84 (previously 0.83)    Main renal artery peak systolic velocity: 173cm/sec with  normal waveform, (previously 200cm/sec).    Renal artery/iliac ratio: 1.0.    The main renal vein is patent.  Impression: Elevated arterial resistive indices may be seen in setting of transplant rejection, drug toxicity or medical renal disease.    Electronically signed by resident: Afsaneh Hanna  Date:    06/04/2022  Time:    23:59    Electronically signed by: Chilo Ward MD  Date:    06/05/2022  Time:    00:40

## 2022-06-05 NOTE — ASSESSMENT & PLAN NOTE
Chronic kidney disease, stage 4 (severe)  - Cr 2.9, baseline ~2.5  - likely prerenal from hypovolemia vs transplant rejection  - gentle IVFs  - follow up UA  - avoid nephrotoxins, renally dose meds

## 2022-06-05 NOTE — ASSESSMENT & PLAN NOTE
- afebrile without leukocytosis  - LFTs, lipase, lactate WNL  - CTRSS without etiology of symptoms  - presentation possible due to transplant rejection?  - KTS consulted; appreciate recs--> no evidence of transplant rejection   - UA negative for infection  - IVFs  - abdominal pain resolved, unclear etiology  - return precautions given

## 2022-06-05 NOTE — HPI
"Ibrahima Phelps is a 66 y.o. male with a PMHx of kidney transplant (2005), atrial fibrillation, CHF, CAD s/p stents who presented to the ED complaining of abdominal pain. Patient reports constant left upper and lower "achy" abdominal pain began earlier today. Pain does not radiate. No aggravating or alleviating factors. Denies fever, chills, nausea, vomiting, diarrhea, constipation, dysuria, hematura, or dark/bloody stools. His last bowel movement was earlier today and normal. Endorses compliance with tacrolimus. He also reports decreased urine output today without the urge to urinate.     ED: AFVSS. No leukocytosis. Labs notable for KATINA (Cr 2.9 from baseline ~2.5). CTRSS without etiology of symptoms. US transplanted kidney shows elevated arterial resistive indices may be seen in setting of transplant rejection, drug toxicity or medical renal disease.      "

## 2022-06-05 NOTE — ASSESSMENT & PLAN NOTE
- hold aldactone given KATINA  - continue nifedipine 60mg BID, hydralazine 50mg TID, and MTP 25mg daily

## 2022-06-05 NOTE — PLAN OF CARE
Problem: Infection  Goal: Absence of Infection Signs and Symptoms  Outcome: Ongoing, Progressing     Problem: Adult Inpatient Plan of Care  Goal: Plan of Care Review  Outcome: Ongoing, Progressing  Goal: Patient-Specific Goal (Individualized)  Outcome: Ongoing, Progressing  Goal: Absence of Hospital-Acquired Illness or Injury  Outcome: Ongoing, Progressing  Goal: Optimal Comfort and Wellbeing  Outcome: Ongoing, Progressing  Goal: Readiness for Transition of Care  Outcome: Ongoing, Progressing     Problem: Fluid and Electrolyte Imbalance (Acute Kidney Injury/Impairment)  Goal: Fluid and Electrolyte Balance  Outcome: Ongoing, Progressing     Problem: Oral Intake Inadequate (Acute Kidney Injury/Impairment)  Goal: Optimal Nutrition Intake  Outcome: Ongoing, Progressing     Problem: Renal Function Impairment (Acute Kidney Injury/Impairment)  Goal: Effective Renal Function  Outcome: Ongoing, Progressing

## 2022-06-05 NOTE — ED PROVIDER NOTES
"Encounter Date: 6/4/2022       History     Chief Complaint   Patient presents with    Abdominal Pain     Pt reports abdominal pain that began this morning. Pt states the pain is in his LLQ. Hx of kidney transplant in 2005. Pt states he feels that the pain is where his transplant is.      66-year-old male with PMH of kidney transplant (2005), atrial fibrillation, CHF, CAD s/p stents presents with abdominal pain. Abdominal pain is LUQ/LLQ, 8/10, "achey", constant, progressive, without known aggravating or relieving factors, and without associated nausea, vomiting, fever, chills, diarrhea, constipation, dysuria, hematuria, and black/bloody stools. Pt did not take anything for pain PTA. He denies associated symptoms. He denies tearing/ripping pain. It does not radiate to back. Denies alcohol and tobacco use. Had an US 2 weeks ago but hasn't been updated with the results. ROS otherwise negative. Reports compliance with tacrolimus. Last BM was earlier today and normal.     The history is provided by the patient.     Review of patient's allergies indicates:   Allergen Reactions    Ace inhibitors Swelling    Verapamil Other (See Comments)     Other reaction(s): Unknown    Povidone-iodine Itching     Past Medical History:   Diagnosis Date    (HFpEF) heart failure with preserved ejection fraction 9/25/2017    Atrial fibrillation     CAD (coronary artery disease)     CHF (congestive heart failure)     Chronic diastolic heart failure 4/8/2020    CKD (chronic kidney disease) stage 3, GFR 30-59 ml/min     Dr. Latif    Diverticulosis     Fever blister     Heart attack 2006    Hyperlipidemia     Hypertension     Immunodeficiency due to treatment with immunosuppressive medication     Keloid cicatrix     Metabolic bone disease     Pericarditis     S/P kidney transplant     Supraventricular tachycardia 06/2019    Thrombocytopenia     Thyroid disease     Unspecified disorder of kidney and ureter     Stage " "IIICKD     Past Surgical History:   Procedure Laterality Date    CARDIOVERSION  04/30/15    CHOLECYSTECTOMY      COLONOSCOPY  April 20, 2011    Diverticulosis, repeat recommended in 3 yrs., repeat colonoscopy 2014 revealed 2 polyps.  He should return in 5 years.    COLONOSCOPY N/A 3/13/2020    Procedure: COLONOSCOPY;  Surgeon: Chon Casper MD;  Location: [facility] (4TH FLR);  Service: Endoscopy;  Laterality: N/A;  ok to hold coumadin x5days-see telephone encounter 2/4/20-tb    COLONOSCOPY N/A 2/4/2021    Procedure: COLONOSCOPY;  Surgeon: Chon Casper MD;  Location: [facility] (4TH FLR);  Service: Endoscopy;  Laterality: N/A;  Eliquis - per Dr. GAVIN Blunt ok to hold x 2 days and "restarted asap"- ERW  last prep "poor", [id number] ordered/ Pt requests Dr. Casper  prep ins. mailed - COVID screening on 2/1/21 PCW- ERW    COLONOSCOPY N/A 3/3/2021    Procedure: COLONOSCOPY;  Surgeon: Chon Casper MD;  Location: [facility] (4TH FLR);  Service: Endoscopy;  Laterality: N/A;  Patient with his second poor bowel pre and has poor prep today.  If patient not intersted or can't get colonoscopy tomorrow will need constipation bowel prep and will need to restart his Eliquis today.Thanks,Chon Blunt-ok to hold Eliquis 2 days prior      COVID     CORONARY ANGIOPLASTY WITH STENT PLACEMENT  9/13/2006    KIDNEY TRANSPLANT  2005    PARATHYROIDECTOMY      TREATMENT OF CARDIAC ARRHYTHMIA N/A 3/28/2022    Procedure: CARDIOVERSION;  Surgeon: Migue Blunt MD;  Location: [facility] EP LAB;  Service: Cardiology;  Laterality: N/A;  AF, DCC, MAC, GP, 3 PREP*RODRIGO deferred pt 100% compliant*     Family History   Problem Relation Age of Onset    No Known Problems Sister     No Known Problems Brother     Thyroid disease Mother         s/p surgery    Heart disease Father         had pacemaker    No Known Problems Sister     Kidney failure Sister     Kidney disease Sister     No Known Problems Sister     Kidney disease " Brother     Kidney failure Brother         s/p transplant    Diabetes Mellitus Neg Hx     Stroke Neg Hx     Heart attack Neg Hx     Cancer Neg Hx     Celiac disease Neg Hx     Cirrhosis Neg Hx     Colon cancer Neg Hx     Esophageal cancer Neg Hx     Inflammatory bowel disease Neg Hx     Rectal cancer Neg Hx     Stomach cancer Neg Hx     Ulcerative colitis Neg Hx     Liver disease Neg Hx     Liver cancer Neg Hx     Crohn's disease Neg Hx     Melanoma Neg Hx      Social History     Tobacco Use    Smoking status: Former Smoker     Packs/day: 0.50     Years: 40.00     Pack years: 20.00     Types: Cigarettes     Quit date: 2022     Years since quittin.2    Smokeless tobacco: Never Used    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   Substance Use Topics    Alcohol use: No    Drug use: No     Review of Systems   Constitutional: Negative for chills and fever.   HENT: Negative for congestion and sore throat.    Eyes: Negative for pain and visual disturbance.   Respiratory: Negative for cough and shortness of breath.    Cardiovascular: Negative for chest pain and leg swelling.   Gastrointestinal: Positive for abdominal pain. Negative for blood in stool, constipation, diarrhea, nausea and vomiting.   Endocrine: Negative for polydipsia and polyuria.   Genitourinary: Negative for dysuria and hematuria.   Musculoskeletal: Negative for arthralgias and back pain.   Skin: Negative for color change and rash.   Neurological: Negative for dizziness, syncope, weakness and headaches.       Physical Exam     Initial Vitals [22]   BP Pulse Resp Temp SpO2   (!) 148/67 66 18 98.5 °F (36.9 °C) 99 %      MAP       --         Physical Exam    Nursing note and vitals reviewed.  Constitutional: He appears well-developed and well-nourished. He is not diaphoretic. No distress.   Appears slightly uncomfortable but is in no acute distress   HENT:   Head:  Normocephalic and atraumatic.   Eyes: Conjunctivae and EOM are normal. Pupils are equal, round, and reactive to light.   Neck: Neck supple.   Normal range of motion.  Cardiovascular: Normal rate, regular rhythm, normal heart sounds and intact distal pulses.   No murmur heard.  Pulmonary/Chest: Breath sounds normal. No respiratory distress. He has no wheezes. He has no rhonchi. He has no rales.   No increased WOB or tachypnea   Abdominal:   Soft but distended abdomen with notable TTP that is worst in the LUQ and LLQ, no rebound or guarding.    Musculoskeletal:         General: No tenderness or edema.      Cervical back: Normal range of motion and neck supple.     Neurological: He is alert and oriented to person, place, and time.   Skin: Skin is warm and dry. Capillary refill takes less than 2 seconds.         ED Course   Procedures  Labs Reviewed   CBC W/ AUTO DIFFERENTIAL - Abnormal; Notable for the following components:       Result Value    RBC 3.98 (*)     Hemoglobin 9.7 (*)     Hematocrit 31.1 (*)     MCV 78 (*)     MCH 24.4 (*)     MCHC 31.2 (*)     Gran # (ANC) 8.4 (*)     Gran % 79.1 (*)     Lymph % 10.1 (*)     All other components within normal limits    Narrative:     Release to patient->Immediate   COMPREHENSIVE METABOLIC PANEL - Abnormal; Notable for the following components:    CO2 22 (*)     Glucose 119 (*)     BUN 35 (*)     Creatinine 2.9 (*)     Calcium 8.6 (*)     eGFR if  24.7 (*)     eGFR if non  21.4 (*)     All other components within normal limits    Narrative:     Release to patient->Immediate   URINALYSIS, REFLEX TO URINE CULTURE - Abnormal; Notable for the following components:    Protein, UA 2+ (*)     All other components within normal limits    Narrative:     Specimen Source->Urine   TROPONIN I - Abnormal; Notable for the following components:    Troponin I 0.029 (*)     All other components within normal limits    Narrative:     Release to  patient->Immediate   ISTAT PROCEDURE - Abnormal; Notable for the following components:    POC Glucose 121 (*)     POC BUN 33 (*)     POC Creatinine 3.3 (*)     POC Ionized Calcium 1.00 (*)     POC Hematocrit 29 (*)     All other components within normal limits   LIPASE    Narrative:     Release to patient->Immediate   LACTIC ACID, PLASMA    Narrative:     Release to patient->Immediate   MAGNESIUM    Narrative:     Release to patient->Immediate   PHOSPHORUS    Narrative:     Release to patient->Immediate   URINALYSIS MICROSCOPIC    Narrative:     Specimen Source->Urine   HEPATITIS C ANTIBODY   TACROLIMUS LEVEL   TROPONIN I   TACROLIMUS LEVEL   SARS-COV-2 RDRP GENE   ISTAT CHEM8          Imaging Results          US Transplant Kidney With Doppler (Final result)  Result time 06/05/22 00:40:46    Final result by Chilo Ward MD (06/05/22 00:40:46)                 Impression:      Elevated arterial resistive indices may be seen in setting of transplant rejection, drug toxicity or medical renal disease.    Electronically signed by resident: Afsaneh Hanna  Date:    06/04/2022  Time:    23:59    Electronically signed by: Chilo Ward MD  Date:    06/05/2022  Time:    00:40             Narrative:    EXAMINATION:  US TRANSPLANT KIDNEY WITH DOPPLER    CLINICAL HISTORY:  LUQ/LLQ severe pain, hx transplant;    TECHNIQUE:  Real time gray scale and doppler ultrasound was performed over the patient's renal allograft.    COMPARISON:  CT renal stone study 06/04/2022    Renal transplant ultrasound 05/20/2022    FINDINGS:  Renal allograft in the left lower quadrant.  The allograft measures 11.5 cm. Normal perfusion. No hydronephrosis.    No fluid collections.    Vasculature:    Resistive indices are as follow:    Interlobar artery: 1.0 (previously 0.8)    Upper segmental: 0.83 (previously 0.81)    Mid segmental: 0.83 (previously 0.88)    Lower segmental: 0.84 (previously 0.83)    Main renal artery peak systolic velocity:  173cm/sec with normal waveform, (previously 200cm/sec).    Renal artery/iliac ratio: 1.0.    The main renal vein is patent.                                CT Renal Stone Study ABD Pelvis WO (Final result)  Result time 06/04/22 23:32:10    Final result by Chilo Ward MD (06/04/22 23:32:10)                 Impression:      1. No evidence of obstructive nephrolithiasis involving the native kidneys or renal allograft.  Multi-cystic atrophic native kidneys bilaterally.  Left lower quadrant renal transplant.  2. Hepatomegaly.  3. Right anterolateral bladder diverticulum.  4. Colonic diverticulosis without evidence of diverticulitis.  5. Stable 0.3 cm pulmonary micronodule in the right middle lobe.  See prior CT chest lung screening report 02/15/2022 for recommendations regarding lung nodule follow-up.  6. Prostatomegaly.  Suggest correlation with PSA.  7. Additional findings as above.  This report was flagged in Epic as abnormal.    Electronically signed by resident: Afsaneh Hanna  Date:    06/04/2022  Time:    22:15    Electronically signed by: Chilo Ward MD  Date:    06/04/2022  Time:    23:32             Narrative:    EXAMINATION:  CT RENAL STONE STUDY ABD PELVIS WO    CLINICAL HISTORY:  Flank pain, kidney stone suspected;    TECHNIQUE:  5.0 mm axial CT images of the abdomen and pelvis were obtained via helical, multi detector CT technique.  No intravenous contrast material was administered.    COMPARISON:  Renal transplant Doppler ultrasound 05/20/2022    CT chest lung screen 02/15/2022    Lumbar spine radiograph 01/19/2022    FINDINGS:  Heart: Coronary calcific atherosclerosis in a multi vessel distribution.  Partially visualized LAD stent.  Aortic leaflet calcification.    Lung Bases: Centrilobular emphysematous changes.  Bibasilar linear bandlike atelectasis.    Stable 0.3 cm pulmonary micronodule in the right middle lobe (series 2 image 23).    Liver: Liver is enlarged measuring 19 cm.  No focal  hepatic abnormality.    Gallbladder: Surgically absent.    Bile Ducts: No intra or extrahepatic biliary ductal dilation.    Pancreas: Mild fatty atrophy.  No pancreatic mass lesion or peripancreatic inflammatory change.    Spleen: Unremarkable.    Adrenals: Unremarkable.    Genitourinary: Multi-cystic atrophic native kidneys are in the expected location.  Numerous cortical calcifications bilaterally.  Left peripelvic cysts.    Renal allograft in the left lower abdomen.  Normal in size.  No evidence of nephrolithiasis or hydroureteronephrosis.    Bladder demonstrates smooth contours without bladder wall thickening.  Right anterolateral bladder diverticulum.    Reproductive organs: Prostate is enlarged.    GI tract/Mesentery: Small hiatal hernia.  Visualized loops of small and large bowel are normal in caliber without evidence for inflammation or obstruction.  Colonic diverticulosis without evidence of diverticulitis.    No abdominopelvic ascites or intraperitoneal free air.    No significant adenopathy.    Abdominal wall: Unremarkable.    Vasculature: Abdominal aorta is normal in caliber with mild calcific atherosclerosis.    Bones: Stable degenerative changes.  No acute fracture or bony destructive process.                                 Medications   sodium chloride 0.9% flush 10 mL (has no administration in time range)   melatonin tablet 6 mg (has no administration in time range)   albuterol-ipratropium 2.5 mg-0.5 mg/3 mL nebulizer solution 3 mL (has no administration in time range)   polyethylene glycol packet 17 g (has no administration in time range)   bisacodyL suppository 10 mg (has no administration in time range)   acetaminophen tablet 650 mg (has no administration in time range)   glucose chewable tablet 16 g (has no administration in time range)   glucose chewable tablet 24 g (has no administration in time range)   glucagon (human recombinant) injection 1 mg (has no administration in time range)    dextrose 10% bolus 125 mL (has no administration in time range)   dextrose 10% bolus 250 mL (has no administration in time range)   ondansetron injection 4 mg (4 mg Intravenous Given 6/5/22 0302)   prochlorperazine injection Soln 5 mg (has no administration in time range)   apixaban tablet 5 mg (has no administration in time range)   atorvastatin tablet 20 mg (has no administration in time range)   doxazosin tablet 4 mg (has no administration in time range)   famotidine tablet 20 mg (has no administration in time range)   flecainide tablet 100 mg (has no administration in time range)   gabapentin capsule 300 mg (has no administration in time range)   hydrALAZINE tablet 50 mg (has no administration in time range)   metoprolol succinate (TOPROL-XL) 24 hr tablet 25 mg (has no administration in time range)   mycophenolate capsule 500 mg (has no administration in time range)   NIFEdipine 24 hr tablet 60 mg (60 mg Oral Given 6/5/22 0301)   paricalcitoL capsule 1 mcg (has no administration in time range)   predniSONE tablet 5 mg (has no administration in time range)   tacrolimus capsule 2 mg (has no administration in time range)   HYDROcodone-acetaminophen 5-325 mg per tablet 1 tablet (has no administration in time range)   HYDROcodone-acetaminophen 7.5-325 mg per tablet 1 tablet (has no administration in time range)   methocarbamoL tablet 500 mg (has no administration in time range)   morphine injection 6 mg (6 mg Intravenous Given 6/4/22 2113)   ondansetron injection 4 mg (4 mg Intravenous Given 6/4/22 2115)   acetaminophen tablet 1,000 mg (1,000 mg Oral Given 6/4/22 2142)   morphine injection 6 mg (6 mg Intravenous Given 6/5/22 0104)     Medical Decision Making:   History:   Old Medical Records: I decided to obtain old medical records.  Initial Assessment:   66M hx of kidney tx ('05) w/1-day of abdominal pain without associated symptoms  - HDS and afebrile  - labs  - US  - morphine for pain  Differential Diagnosis:    Transplant rejection, nephrolithiasis, diverticulitis, PUD/gastritis, pancreatitis, UTI, pyelonephritis, ACS  Clinical Tests:   Lab Tests: Ordered and Reviewed  Radiological Study: Ordered and Reviewed  Medical Tests: Ordered and Reviewed  ED Management:  See ED course            Attending Attestation:   Physician Attestation Statement for Resident:  As the supervising MD   Physician Attestation Statement: I have personally seen and examined this patient.   I agree with the above history. -:   As the supervising MD I agree with the above PE.    As the supervising MD I agree with the above treatment, course, plan, and disposition.            Attending ED Notes:   66-year-old gentleman presenting with left lower quadrant abdominal pain.  Pain localized over the transplanted kidney.  Reports the pain is fairly severe.  He reports having similar severity of pain the last time he had rejection of a prior kidney.  Emergency department workup largely unremarkable.  CT scan does not indicate a possible etiology for the patient's pain.  An ultrasound of the transplanted kidney is concerning for possible rejection.  Kidney transplant medicine is recommending admission to the hospital for further evaluation.  The at the time of admission, a urinalysis is pending to evaluate for infection.  The patient reports that he has been having difficulty with urinating for the last 2 weeks.  He states that he does does not have the urge to go and has had decreased urine output.      ED Course as of 06/05/22 0405   Sat Jun 04, 2022 2138 CBC W/ AUTO DIFFERENTIAL(!)  CBC largely unremarkable without leukocytosis or decreased platelets. Mild anemia with hgb 9.7 [BD]   2138 ISTAT PROCEDURE(!)  Chem8 shows baseline Cr of 3.3 without significant electrolyte derangement [BD]   2145 Lactate, Horace: 1.1  Sepsis and/or mesenteric ischemia are unlikely [BD]   2158 Troponin I(!): 0.029  Elevated but less than prior. Low suspicion for for ACS [BD]    2159 Comp. Metabolic Panel(!)  CMP appears baseline with Cr 2.9 and without electrolyte abnormality or elevated LFTs. Transplant rejection is less likely [BD]   2159 Lipase: 22  Pancreatitis is unlikely [BD]   2200 Magnesium: 2.1 [BD]   2200 Phosphorus: 3.6 [BD]   2200 Resident patient care handed off to Dr. Maciel. Imaging and UA still pending. Dispo also pending results.  [BD]      ED Course User Index  [BD] Indra Lopez MD             Clinical Impression:   Final diagnoses:  [R10.12] LUQ abdominal pain (Primary)  [D84.821, Z79.899] Immunodeficiency due to treatment with immunosuppressive medication  [Z94.0] H/O kidney transplant          ED Disposition Condition    Observation               Indra Lopez MD  Resident  06/04/22 2201       Sonja Navarro MD  06/05/22 9113

## 2022-06-05 NOTE — ASSESSMENT & PLAN NOTE
- Hgb 9.7, baseline ~11-12  - slight drop in Hgb likely due to KATINA on CKD 4  - iron/anemia panel in AM  - trend CBC  - consider GI consult in AM if H/H drops

## 2022-06-05 NOTE — H&P
"[facility] - Emergency Dept  [facility] Medicine  History & Physical    Patient Name: Ibrahima Phelps  MRN: [id number]  Patient Class: OP- Observation  Admission Date: 6/4/2022  Attending Physician: Sonja Navarro MD   Primary Care Provider: Connie Magdaleno MD         Patient information was obtained from patient, past medical records and ER records.     Subjective:     Principal Problem:Left lower quadrant abdominal pain    Chief Complaint:   Chief Complaint   Patient presents with    Abdominal Pain     Pt reports abdominal pain that began this morning. Pt states the pain is in his LLQ. Hx of kidney transplant in 2005. Pt states he feels that the pain is where his transplant is.         HPI: Ibrahima Phelps is a 66 y.o. male with a PMHx of kidney transplant (2005), atrial fibrillation, CHF, CAD s/p stents who presented to the ED complaining of abdominal pain. Patient reports constant left upper and lower "achy" abdominal pain began earlier today. Pain does not radiate. No aggravating or alleviating factors. Denies fever, chills, nausea, vomiting, diarrhea, constipation, dysuria, hematura, or dark/bloody stools. His last bowel movement was earlier today and normal. Endorses compliance with tacrolimus. He also reports decreased urine output today without the urge to urinate.     ED: AFVSS. No leukocytosis. Labs notable for KATINA (Cr 2.9 from baseline ~2.5). CTRSS without etiology of symptoms. US transplanted kidney shows elevated arterial resistive indices may be seen in setting of transplant rejection, drug toxicity or medical renal disease.          Past Medical History:   Diagnosis Date    (HFpEF) heart failure with preserved ejection fraction 9/25/2017    Atrial fibrillation     CAD (coronary artery disease)     CHF (congestive heart failure)     Chronic diastolic heart failure 4/8/2020    CKD (chronic kidney disease) stage 3, GFR 30-59 ml/min     Dr. Latif    Diverticulosis     Fever blister     Heart attack " "2006    Hyperlipidemia     Hypertension     Immunodeficiency due to treatment with immunosuppressive medication     Keloid cicatrix     Metabolic bone disease     Pericarditis     S/P kidney transplant     Supraventricular tachycardia 06/2019    Thrombocytopenia     Thyroid disease     Unspecified disorder of kidney and ureter     Stage IIICKD       Past Surgical History:   Procedure Laterality Date    CARDIOVERSION  04/30/15    CHOLECYSTECTOMY      COLONOSCOPY  April 20, 2011    Diverticulosis, repeat recommended in 3 yrs., repeat colonoscopy 2014 revealed 2 polyps.  He should return in 5 years.    COLONOSCOPY N/A 3/13/2020    Procedure: COLONOSCOPY;  Surgeon: Chon Casper MD;  Location: [facility] (4TH FLR);  Service: Endoscopy;  Laterality: N/A;  ok to hold coumadin x5days-see telephone encounter 2/4/20-tb    COLONOSCOPY N/A 2/4/2021    Procedure: COLONOSCOPY;  Surgeon: Chon Casper MD;  Location: [facility] (4TH FLR);  Service: Endoscopy;  Laterality: N/A;  Eliquis - per Dr. GAVIN Blunt ok to hold x 2 days and "restarted asap"- ERW  last prep "poor", [id number] ordered/ Pt requests Dr. Casper  prep ins. mailed - COVID screening on 2/1/21 PCW- ERW    COLONOSCOPY N/A 3/3/2021    Procedure: COLONOSCOPY;  Surgeon: Chon Casper MD;  Location: [facility] (4TH FLR);  Service: Endoscopy;  Laterality: N/A;  Patient with his second poor bowel pre and has poor prep today.  If patient not intersted or can't get colonoscopy tomorrow will need constipation bowel prep and will need to restart his Eliquis today.Thanks,Chon     per Dr Blunt-ok to hold Eliquis 2 days prior      COVID     CORONARY ANGIOPLASTY WITH STENT PLACEMENT  9/13/2006    KIDNEY TRANSPLANT  2005    PARATHYROIDECTOMY      TREATMENT OF CARDIAC ARRHYTHMIA N/A 3/28/2022    Procedure: CARDIOVERSION;  Surgeon: Migue Blunt MD;  Location: [facility] EP LAB;  Service: Cardiology;  Laterality: N/A;  AF, DCC, MAC, GP, 3 PREP*RODRIGO deferred pt " 100% compliant*       Review of patient's allergies indicates:   Allergen Reactions    Ace inhibitors Swelling    Verapamil Other (See Comments)     Other reaction(s): Unknown    Povidone-iodine Itching       No current facility-administered medications on file prior to encounter.     Current Outpatient Medications on File Prior to Encounter   Medication Sig    albuterol (VENTOLIN HFA) 90 mcg/actuation inhaler Inhale 2 puffs into the lungs every 6 (six) hours as needed for Wheezing or Shortness of Breath. Rescue    ammonium lactate 12 % Crea Apply 1 g topically once daily.    apixaban (ELIQUIS) 5 mg Tab Take 1 tablet (5 mg total) by mouth 2 (two) times daily.    aspirin (ECOTRIN) 81 MG EC tablet Take 1 tablet by mouth Daily.    atorvastatin (LIPITOR) 20 MG tablet Take 1 tablet (20 mg total) by mouth once daily.    calcium carbonate (OS-TRISH) 500 mg calcium (1,250 mg) tablet TAKE 2 TABLETS BY MOUTH TWICE A DAY    doxazosin (CARDURA) 4 MG tablet TAKE 1 TABLET (4 MG TOTAL) BY MOUTH EVERY 12 (TWELVE) HOURS.    famotidine (PEPCID) 20 MG tablet Take 1 tablet (20 mg total) by mouth 2 (two) times daily.    fexofenadine (ALLEGRA) 60 MG tablet Take 1 tablet by mouth Twice daily as needed.    flecainide (TAMBOCOR) 100 MG Tab Take 1 tablet (100 mg total) by mouth every 12 (twelve) hours.    fluticasone propionate (FLONASE) 50 mcg/actuation nasal spray 1 spray (50 mcg total) by Each Nostril route once daily.    furosemide (LASIX) 40 MG tablet Take 0.5 tablets (20 mg total) by mouth 2 (two) times daily.    gabapentin (NEURONTIN) 300 MG capsule TAKE 1 CAPSULE BY MOUTH TWICE A DAY    hydrALAZINE (APRESOLINE) 50 MG tablet Take 1 tablet (50 mg total) by mouth 3 (three) times daily.    metoprolol succinate (TOPROL-XL) 25 MG 24 hr tablet Take 1 tablet (25 mg total) by mouth once daily.    multivitamin (THERAGRAN) per tablet Take 1 tablet by mouth Daily.    mycophenolate (CELLCEPT) 250 mg Cap Take 2 capsules (500 mg  total) by mouth 2 (two) times daily.    nicotine (NICODERM CQ) 21 mg/24 hr Place 1 patch onto the skin once daily.    NIFEdipine (PROCARDIA-XL) 60 MG (OSM) 24 hr tablet Take 1 tablet (60 mg total) by mouth 2 (two) times a day.    paricalcitoL (ZEMPLAR) 1 MCG capsule TAKE 1 TABLET BY MOUTH ON MONDAY, WEDNESDAY, & FRIDAY    potassium chloride (KLOR-CON 10) 10 MEQ TbSR Take 1 tablet (10 mEq total) by mouth once daily.    predniSONE (DELTASONE) 5 MG tablet TAKE 1 TABLET BY MOUTH EVERY DAY IN THE MORNING    spironolactone (ALDACTONE) 25 MG tablet Take 1 tablet (25 mg total) by mouth once daily.    tacrolimus (PROGRAF) 1 MG Cap Two po in am and two po in pm    vitamin D 1000 units Tab Take 370 mg by mouth 2 (two) times daily. 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   Substance and Sexual Activity    Alcohol use: No    Drug use: No    Sexual activity: Yes     Partners: Female     Review of Systems   Constitutional:  Negative for activity change, chills and fever.   HENT:  Negative for congestion, trouble swallowing and voice change.    Eyes:  Negative for photophobia and visual disturbance.   Respiratory:  Negative for chest tightness, shortness of breath and wheezing.    Cardiovascular:  Negative for chest pain, palpitations and leg swelling.   Gastrointestinal:  Positive for abdominal pain. Negative for constipation, diarrhea, nausea and vomiting.   Genitourinary:  Positive for flank pain.  Negative for dysuria, frequency, hematuria and urgency.   Musculoskeletal:  Negative for arthralgias, back pain and gait problem.   Skin:  Negative for color change and rash.   Neurological:  Negative for dizziness, syncope, weakness, light-headedness, numbness and headaches.   Psychiatric/Behavioral:  Negative for agitation and confusion. The patient is not nervous/anxious.    Objective:     Vital Signs (Most Recent):  Temp: 98.5 °F (36.9 °C) (06/04/22 2044)  Pulse: (!) 59 (06/04/22 2300)  Resp: 17 (06/05/22 0104)  BP: (!) 145/67 (06/04/22 2300)  SpO2: 95 % (06/04/22 2300)   Vital Signs (24h Range):  Temp:  [98.5 °F (36.9 °C)] 98.5 °F (36.9 °C)  Pulse:  [59-66] 59  Resp:  [17-20] 17  SpO2:  [95 %-99 %] 95 %  BP: (145-148)/(67) 145/67     Weight: 110.2 kg (243 lb)  Body mass index is 32.06 kg/m².    Physical Exam  Vitals and nursing note reviewed.   Constitutional:       General: He is not in acute distress.     Appearance: He is well-developed. He is obese.   HENT:      Head: Normocephalic and atraumatic.      Mouth/Throat:      Pharynx: No oropharyngeal exudate.   Eyes:      General: No scleral icterus.     Extraocular Movements: Extraocular movements intact.      Conjunctiva/sclera: Conjunctivae normal.   Cardiovascular:      Rate and Rhythm: Normal rate and regular rhythm.      Heart sounds: Normal heart sounds.   Pulmonary:      Effort: Pulmonary effort is normal. No respiratory distress.      Breath sounds: Normal breath sounds. No wheezing.   Abdominal:      General: Bowel sounds are normal. There is no distension.      Palpations: Abdomen is soft.      Tenderness: There is abdominal tenderness in the left lower quadrant.   Musculoskeletal:         General: No tenderness. Normal range of motion.      Cervical back: Normal range of motion and neck supple.   Lymphadenopathy:      Cervical: No cervical adenopathy.   Skin:     General: Skin is warm and dry.      Capillary Refill: Capillary refill takes less than  2 seconds.      Findings: No rash.   Neurological:      Mental Status: He is alert and oriented to person, place, and time.      Cranial Nerves: No cranial nerve deficit.      Sensory: No sensory deficit.      Coordination: Coordination normal.   Psychiatric:         Behavior: Behavior normal.         Thought Content: Thought content normal.         Judgment: Judgment normal.           Significant Labs: All pertinent labs within the past 24 hours have been reviewed.  BMP:   Recent Labs   Lab 06/04/22 2112   *      K 3.9      CO2 22*   BUN 35*   CREATININE 2.9*   CALCIUM 8.6*   MG 2.1     CBC:   Recent Labs   Lab 06/04/22 2112 06/04/22 2120   WBC 10.66  --    HGB 9.7*  --    HCT 31.1* 29*     --        Significant Imaging: I have reviewed all pertinent imaging results/findings within the past 24 hours.  US Transplant Kidney With Doppler  Narrative: EXAMINATION:  US TRANSPLANT KIDNEY WITH DOPPLER    CLINICAL HISTORY:  LUQ/LLQ severe pain, hx transplant;    TECHNIQUE:  Real time gray scale and doppler ultrasound was performed over the patient's renal allograft.    COMPARISON:  CT renal stone study 06/04/2022    Renal transplant ultrasound 05/20/2022    FINDINGS:  Renal allograft in the left lower quadrant.  The allograft measures 11.5 cm. Normal perfusion. No hydronephrosis.    No fluid collections.    Vasculature:    Resistive indices are as follow:    Interlobar artery: 1.0 (previously 0.8)    Upper segmental: 0.83 (previously 0.81)    Mid segmental: 0.83 (previously 0.88)    Lower segmental: 0.84 (previously 0.83)    Main renal artery peak systolic velocity: 173cm/sec with normal waveform, (previously 200cm/sec).    Renal artery/iliac ratio: 1.0.    The main renal vein is patent.  Impression: Elevated arterial resistive indices may be seen in setting of transplant rejection, drug toxicity or medical renal disease.    Electronically signed by resident: Afsaneh  Cyndi  Date:    06/04/2022  Time:    23:59    Electronically signed by: Chilo Ward MD  Date:    06/05/2022  Time:    00:40      Assessment/Plan:     * Left lower quadrant abdominal pain  - afebrile without leukocytosis  - LFTs, lipase, lactate WNL  - CTRSS without etiology of symptoms  - presentation possible due to transplant rejection?  - KTS consulted; appreciate recs  - follow UA  - IVFs  - pain control  - PRN anti-emetics    Anemia due to stage 4 chronic kidney disease  - Hgb 9.7, baseline ~11-12  - slight drop in Hgb likely due to KATINA on CKD 4  - iron/anemia panel in AM  - trend CBC  - consider GI consult in AM if H/H drops    Chronic diastolic heart failure  - appears euvolemic vs dry  - hold lasix  - monitor volume status closely s/p IVFs    Atrial fibrillation  - continue flecainide, MTP, and eliquis     KATINA (acute kidney injury)  Chronic kidney disease, stage 4 (severe)  - Cr 2.9, baseline ~2.5  - likely prerenal from hypovolemia vs transplant rejection  - gentle IVFs  - follow up UA  - avoid nephrotoxins, renally dose meds    Elevated troponin  - Trop 0.029, improved from baseline ~0.05  - no reported chest pain  - EKG without acute ST/T wave changes  - recent nuclear stress (4/2022) negative for ischemia  - trend trop    Mixed hyperlipidemia  - continue ASA, statin     Hypertension  - hold aldactone given KATINA  - continue nifedipine 60mg BID, hydralazine 50mg TID, and MTP 25mg daily     Immunodeficiency due to treatment with immunosuppressive medication  Kidney replaced by transplant  - continue tacro and cellcept  - appreciate KTS assistance      VTE Risk Mitigation (From admission, onward)         Ordered     apixaban tablet 5 mg  2 times daily         06/05/22 0209     IP VTE HIGH RISK PATIENT  Once         06/05/22 0200     Place sequential compression device  Until discontinued         06/05/22 0200                   Verenice La PA-C  Department of Hospital Medicine   [facility] -  Emergency Dept

## 2022-06-05 NOTE — HPI
"Ibrahima is a 66 y.o. male with kidney transplant 2005 who was admitted via ED after presented with abdominal pain and found to have some KATINA (2.5-->2.9). Patient reported LUQ and LLQ "achy" non radiatingabdominal pain began earlier on day of admission. No fever or LUTS reported, but decreased UO noted.     Creatinine trend reviewed:    Creat appear labile, but baseline seems to be low-mid 2s, higher since April.      Imaging from 6/5/22:   US allograft:  allograft measures 11.5 cm. Normal perfusion. No hydronephrosis. Elevated RIs, nonspecific.  CT a/p:no left sided findings to explain LUQ and LLQ pain. Renal allograft in the left lower abdomen.  Normal in size.  No evidence of nephrolithiasis or hydroureteronephrosis.  "

## 2022-06-06 LAB — HCV AB SERPL QL IA: NEGATIVE

## 2022-06-07 ENCOUNTER — PATIENT MESSAGE (OUTPATIENT)
Dept: INTERNAL MEDICINE | Facility: CLINIC | Age: 67
End: 2022-06-07
Payer: MEDICARE

## 2022-06-07 DIAGNOSIS — R10.84 GENERALIZED ABDOMINAL PAIN: Primary | ICD-10-CM

## 2022-06-08 ENCOUNTER — OFFICE VISIT (OUTPATIENT)
Dept: CARDIOLOGY | Facility: CLINIC | Age: 67
End: 2022-06-08
Payer: MEDICARE

## 2022-06-08 VITALS
OXYGEN SATURATION: 96 % | DIASTOLIC BLOOD PRESSURE: 70 MMHG | WEIGHT: 240.31 LBS | BODY MASS INDEX: 31.85 KG/M2 | HEIGHT: 73 IN | SYSTOLIC BLOOD PRESSURE: 150 MMHG | HEART RATE: 66 BPM

## 2022-06-08 DIAGNOSIS — I10 PRIMARY HYPERTENSION: Primary | ICD-10-CM

## 2022-06-08 DIAGNOSIS — E78.2 MIXED HYPERLIPIDEMIA: ICD-10-CM

## 2022-06-08 DIAGNOSIS — I50.32 CHRONIC DIASTOLIC HEART FAILURE: ICD-10-CM

## 2022-06-08 DIAGNOSIS — R53.83 FATIGUE, UNSPECIFIED TYPE: ICD-10-CM

## 2022-06-08 DIAGNOSIS — I48.0 PAROXYSMAL ATRIAL FIBRILLATION: ICD-10-CM

## 2022-06-08 DIAGNOSIS — I25.10 CORONARY ARTERY DISEASE INVOLVING NATIVE CORONARY ARTERY OF NATIVE HEART WITHOUT ANGINA PECTORIS: ICD-10-CM

## 2022-06-08 DIAGNOSIS — Z94.0 H/O KIDNEY TRANSPLANT: ICD-10-CM

## 2022-06-08 PROCEDURE — 99999 PR PBB SHADOW E&M-EST. PATIENT-LVL V: CPT | Mod: PBBFAC,GC,, | Performed by: STUDENT IN AN ORGANIZED HEALTH CARE EDUCATION/TRAINING PROGRAM

## 2022-06-08 PROCEDURE — 99999 PR PBB SHADOW E&M-EST. PATIENT-LVL V: ICD-10-PCS | Mod: PBBFAC,GC,, | Performed by: STUDENT IN AN ORGANIZED HEALTH CARE EDUCATION/TRAINING PROGRAM

## 2022-06-08 PROCEDURE — 99214 OFFICE O/P EST MOD 30 MIN: CPT | Mod: GC,S$GLB,, | Performed by: STUDENT IN AN ORGANIZED HEALTH CARE EDUCATION/TRAINING PROGRAM

## 2022-06-08 PROCEDURE — 99214 PR OFFICE/OUTPT VISIT, EST, LEVL IV, 30-39 MIN: ICD-10-PCS | Mod: GC,S$GLB,, | Performed by: STUDENT IN AN ORGANIZED HEALTH CARE EDUCATION/TRAINING PROGRAM

## 2022-06-08 NOTE — PROGRESS NOTES
PCP - Connie Magdaleno MD  Referring Physician:     Subjective:   Patient ID:    Ibrahima Phelps is a 66 y.o. male who presents for follow-up of Atrial Fibrillation     PROBLEM LIST:  PAF  CAD s/p PCI   HFpEF  HTN  HLD  Carotid stenoses (20-39% bilateral)  H/o pericarditis  CKD3  S/p kidney transplant  Former smoker     HPI: Patient was in AF in his last clinic visit 3/8/22 and has since been cardioverted back to NSR. After DCCV, he was started on Flecainide and reports increased fatigue, daytime sleepiness and Insomnia. He comes today requesting a change in medication to help with symptoms. He quit smoking in the end of February.  He denies any chest pain, shortness of breath, PND, orthopnea, or heart palpitations.      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   Substance Use Topics    Alcohol use: No     Family History   Problem Relation Age of Onset    No Known Problems Sister     No Known Problems Brother     Thyroid disease Mother         s/p surgery    Heart disease Father         had pacemaker    No Known Problems Sister     Kidney failure Sister     Kidney disease Sister     No Known Problems Sister     Kidney disease Brother     Kidney failure Brother         s/p transplant    Diabetes Mellitus Neg Hx     Stroke Neg Hx     Heart attack Neg Hx     Cancer Neg Hx     Celiac disease Neg Hx     Cirrhosis Neg Hx     Colon cancer Neg Hx     Esophageal cancer Neg Hx     Inflammatory bowel disease Neg Hx     Rectal cancer Neg Hx     Stomach cancer Neg Hx     Ulcerative colitis Neg Hx     Liver disease Neg Hx     Liver cancer Neg Hx     Crohn's disease Neg Hx     Melanoma Neg Hx         Meds:     Review of patient's allergies indicates:   Allergen Reactions    Ace inhibitors Swelling    Verapamil Other (See Comments)     Other reaction(s): Unknown    Povidone-iodine Itching       Current Outpatient Medications:     albuterol (VENTOLIN HFA) 90 mcg/actuation inhaler, Inhale 2 puffs into the lungs every 6 (six) hours as needed for Wheezing or Shortness of Breath. Rescue, Disp: 18 g, Rfl: 3    ammonium lactate 12 % Crea, Apply 1 g topically once daily., Disp: 140 g, Rfl: 1    apixaban (ELIQUIS) 5 mg Tab, Take 1 tablet (5 mg total) by mouth 2 (two) times daily., Disp: 180 tablet, Rfl: 0    aspirin (ECOTRIN) 81 MG EC tablet, Take 1 tablet by mouth Daily., Disp: , Rfl:     atorvastatin (LIPITOR) 20 MG tablet, Take 1 tablet (20 mg total) by mouth once daily., Disp: 90 tablet, Rfl: 3    calcium carbonate (OS-TRISH) 500 mg calcium (1,250 mg) tablet, TAKE 2 TABLETS BY MOUTH TWICE A DAY, Disp: 360 tablet, Rfl: 3    doxazosin (CARDURA) 4 MG tablet, TAKE 1 TABLET (4 MG TOTAL) BY MOUTH EVERY 12 (TWELVE) HOURS., Disp: 180 tablet, Rfl: 3    famotidine (PEPCID) 20 MG tablet, Take 1 tablet (20 mg total) by mouth 2 (two) times daily., Disp: 180 tablet, Rfl: 3    fexofenadine (ALLEGRA) 60 MG tablet, Take 1 tablet by mouth Twice daily as needed., Disp: , Rfl:     flecainide (TAMBOCOR) 100 MG Tab, Take 1 tablet (100 mg total) by mouth every 12 (twelve) hours., Disp: 60 tablet, Rfl: 11    fluticasone propionate (FLONASE) 50 mcg/actuation nasal spray, 1 spray (50 mcg total) by Each Nostril route once daily., Disp: 18.2 mL, Rfl: 0    furosemide (LASIX) 40 MG tablet, Take 0.5 tablets (20 mg total) by mouth 2 (two) times daily., Disp: 180 tablet, Rfl: 3    gabapentin (NEURONTIN) 300 MG capsule, TAKE 1 CAPSULE BY MOUTH TWICE A DAY, Disp: 180 capsule, Rfl: 2    hydrALAZINE (APRESOLINE) 50 MG tablet, Take 1 tablet (50 mg total) by mouth 3 (three) times daily., Disp: 270 tablet, Rfl: 3    metoprolol  "succinate (TOPROL-XL) 25 MG 24 hr tablet, Take 1 tablet (25 mg total) by mouth once daily., Disp: 30 tablet, Rfl: 11    multivitamin (THERAGRAN) per tablet, Take 1 tablet by mouth Daily., Disp: , Rfl:     mycophenolate (CELLCEPT) 250 mg Cap, Take 2 capsules (500 mg total) by mouth 2 (two) times daily., Disp: 360 capsule, Rfl: 3    nicotine (NICODERM CQ) 21 mg/24 hr, Place 1 patch onto the skin once daily., Disp: 90 patch, Rfl: 3    NIFEdipine (PROCARDIA-XL) 60 MG (OSM) 24 hr tablet, Take 1 tablet (60 mg total) by mouth 2 (two) times a day., Disp: 180 tablet, Rfl: 3    paricalcitoL (ZEMPLAR) 1 MCG capsule, TAKE 1 TABLET BY MOUTH ON MONDAY, WEDNESDAY, & FRIDAY, Disp: 36 capsule, Rfl: 3    potassium chloride (KLOR-CON 10) 10 MEQ TbSR, Take 1 tablet (10 mEq total) by mouth once daily., Disp: 90 tablet, Rfl: 4    predniSONE (DELTASONE) 5 MG tablet, TAKE 1 TABLET BY MOUTH EVERY DAY IN THE MORNING, Disp: 90 tablet, Rfl: 3    spironolactone (ALDACTONE) 25 MG tablet, Take 1 tablet (25 mg total) by mouth once daily., Disp: 90 tablet, Rfl: 3    tacrolimus (PROGRAF) 1 MG Cap, Two po in am and two po in pm, Disp: 270 capsule, Rfl: 3    vitamin D 1000 units Tab, Take 370 mg by mouth 2 (two) times daily. 2 tablets BID, Disp: , Rfl:     Review of Systems   Constitutional: Positive for malaise/fatigue.   Eyes: Negative.    Respiratory: Negative for cough and shortness of breath.    Cardiovascular: Negative for chest pain, palpitations, orthopnea, claudication, leg swelling and PND.   Gastrointestinal: Negative.    Genitourinary: Negative.    Neurological: Negative.    Psychiatric/Behavioral: The patient has insomnia.    All other systems reviewed and are negative.      Objective:   BP (!) 150/70 (BP Location: Left arm, Patient Position: Sitting, BP Method: Large (Automatic))   Pulse 66   Ht 6' 1" (1.854 m)   Wt 109 kg (240 lb 4.8 oz)   SpO2 96%   BMI 31.70 kg/m²   Physical Exam  Vitals and nursing note reviewed. "   Constitutional:       Appearance: Normal appearance.   HENT:      Head: Normocephalic.   Cardiovascular:      Rate and Rhythm: Normal rate and regular rhythm.      Pulses: Normal pulses.      Heart sounds: Normal heart sounds. No murmur heard.  Pulmonary:      Effort: Pulmonary effort is normal. No respiratory distress.      Breath sounds: Normal breath sounds.   Musculoskeletal:      Cervical back: Normal range of motion and neck supple.      Right lower leg: No edema.      Left lower leg: No edema.   Skin:     General: Skin is warm.   Neurological:      General: No focal deficit present.      Mental Status: He is alert.         Labs:     Lab Results   Component Value Date     06/05/2022    K 4.3 06/05/2022     06/05/2022    CO2 21 (L) 06/05/2022    BUN 35 (H) 06/05/2022    CREATININE 2.8 (H) 06/05/2022    GLUCOSE 121 (H) 04/12/2005    ANIONGAP 13 06/05/2022     Lab Results   Component Value Date    HGBA1C 5.9 (H) 06/05/2022     Lab Results   Component Value Date     (H) 08/15/2021     (H) 03/23/2020     (H) 06/15/2019       Lab Results   Component Value Date    WBC 11.36 06/05/2022    HGB 10.8 (L) 06/05/2022    HCT 34.5 (L) 06/05/2022    HCT 29 (L) 06/04/2022     06/05/2022    GRAN 8.8 (H) 06/05/2022    GRAN 77.1 (H) 06/05/2022     Lab Results   Component Value Date    CHOL 145 03/08/2022    HDL 56 03/08/2022    LDLCALC 70.2 03/08/2022    TRIG 94 03/08/2022       Lab Results   Component Value Date     06/05/2022    K 4.3 06/05/2022     06/05/2022    CO2 21 (L) 06/05/2022    BUN 35 (H) 06/05/2022    CREATININE 2.8 (H) 06/05/2022    GLUCOSE 121 (H) 04/12/2005    ANIONGAP 13 06/05/2022     Lab Results   Component Value Date    HGBA1C 5.9 (H) 06/05/2022     Lab Results   Component Value Date     (H) 08/15/2021     (H) 03/23/2020     (H) 06/15/2019    Lab Results   Component Value Date    WBC 11.36 06/05/2022    HGB 10.8 (L) 06/05/2022    HCT  34.5 (L) 06/05/2022    HCT 29 (L) 06/04/2022     06/05/2022    GRAN 8.8 (H) 06/05/2022    GRAN 77.1 (H) 06/05/2022     Lab Results   Component Value Date    CHOL 145 03/08/2022    HDL 56 03/08/2022    LDLCALC 70.2 03/08/2022    TRIG 94 03/08/2022                Cardiovascular Imaging:     spect 4/25/22    Normal myocardial perfusion scan. There is no evidence of myocardial ischemia or infarction.    The visually estimated ejection fraction is normal at rest and normal during stress.    There is normal wall motion at rest and post stress.    LV cavity size is normal at rest and normal at stress.    The EKG portion of this study is abnormal but not diagnostic.    The patient reported no chest pain during the stress test.    There were no arrhythmias during stress.    When compared to the previous study from 6/29/2020, there are no significant changes.    Echo 3/21/22  · Moderate left atrial enlargement.  · The left ventricle is normal in size with concentric remodeling and normal systolic function.  · The estimated ejection fraction is 55%.  · There are segmental left ventricular wall motion abnormalities : akinetic basal inferior wall.  · Indeterminate left ventricular diastolic function.  · Mild right atrial enlargement.  · Normal right ventricular size with normal right ventricular systolic function.  · There is mild aortic valve stenosis.  · Aortic valve area is 1.84 cm2; peak velocity is 2.60 m/s; mean gradient is 13 mmHg.  · Mild aortic regurgitation.  · Mild pulmonic regurgitation.  · The estimated PA systolic pressure is 25 mmHg.  · Normal central venous pressure (3 mmHg).    Assessment & Plan:   Mr. Ibrahima Phelps is a 66 y.o. male with a history of PAD, CAD s/p PCI 2006, HFpEF, S/P kidney transplant on immunosuppressive therapy with Tacrolimus, mycophenolate and prednisone recently started on AAD with Flecainide after undergoing a DCCV, now presenting with fatigue, daytime sleepiness and  insomnia which I suspect may be a side effect of AAD. Unfortunately, given his history of CAD and immunosuppressive therapy, AAD options are limited. Sotalol and Amiodarone appear to be 2 other options that can be used; however given the side effect profile patient decided to hold off on changing medications at this time. We discussed extensively the option of a PVI ablation however patient was not enthusiastic about the idea and would also like to defer at this time.        1. Primary hypertension    2. Paroxysmal atrial fibrillation    3. Coronary artery disease involving native coronary artery of native heart without angina pectoris    4. Mixed hyperlipidemia    5. Chronic diastolic heart failure    6. H/O kidney transplant    7. Fatigue, unspecified type      #PAF  - continue Eliquis 5mg bid for cardioembolic protection  - continue Flecainide for now  - Continue Toprol XL 25 daily for rate control  - he does have a follow up appt with Dr. Migue Blunt in a few days where he can discuss further options at that time.     #CAD  - He is asymptomatic with preserved ejection fraction.    - His last ischemic evaluation was in 2020 and was normal.  Continue aspirin and statin therapy.    #HFpEF  - He appears well compensated and euvolemic on his current diuretic regimen.      #HLD  - He remains on atorvastatin 10 mg daily.    #History of kidney transplant  - on prednisone, tacrolimus and mycophenolate    #Bilateral carotid artery stenosis :    - Continue aspirin and statin therapy.  Follow-up ultrasound ordered.    #Hypertensive   - continue current regimen    Signed:  Anuja Valladares M.D.  Page # [phone]  Cardiovascular Fellow  [facility]

## 2022-06-08 NOTE — PROGRESS NOTES
I have reviewed the notes, assessments, and/or procedures performed by the fellow, I concur with Dr. Valladares's documentation of Ibrahima Phelps.

## 2022-06-09 NOTE — PROGRESS NOTES
Subjective:     HPI    I had the pleasure of seeing Ibrahima Phelps in follow-up for his history of atrial fibrillation. He last saw me in 3/2022. He is a 66M with HLD, HTN, CAD status-post PCI in 2006, and kidney transplant in 2005 (most recent Cr 2.3 in 1/2022), whose history of AF dates back to 4/2015 when he presented to [facility] [place] with sudden onset palpitations and was found to be in AF with RVR. He was cardioverted and discharged on anticoagulation. He had recurrent AF in 5/2017 which prompted an ED visit--he spontaneously reverted to sinus rhythm. He was recently seen in cardiology clinic where he was found to be back in AF at 100 bpm.     Mr. Phelps is currently on toprol xl 25 mg daily, and eliquis 5 mg bid.     A cardiac SPECT in 6/2020 was negative for ischemia. An echo performed in 3/2022 showed an EF of 55% and moderate LAE.    At his 3/2022 office visit Mr. Phelps noted worsening DAUGHERTY since AF recurred. At that time antiarrhythmic options were limited in light of ongoing tacrolimus/MMF use as well as baseline CRI with recent Cr of 2.3 (CrCl 40). Flecainide 100 mg bid was started, and DCCV performed 3 days later.    Mr. Phelps is complaining of worsening fatigue which started after his cardioversion.     My interpretation of today's ECG is sinus rhythm at 78 bpm with QRSd 114 ms.    Review of Systems   Constitutional: Positive for malaise/fatigue. Negative for decreased appetite, weight gain and weight loss.   HENT: Negative for sore throat.    Eyes: Negative for blurred vision.   Cardiovascular: Positive for dyspnea on exertion. Negative for chest pain, irregular heartbeat, leg swelling, near-syncope, orthopnea, palpitations, paroxysmal nocturnal dyspnea and syncope.   Respiratory: Negative for shortness of breath.    Skin: Negative for rash.   Musculoskeletal: Negative for arthritis.   Gastrointestinal: Negative for abdominal pain.   Neurological: Negative for focal weakness.   Psychiatric/Behavioral: Negative for  altered mental status.        Objective:    Physical Exam  Vitals and nursing note reviewed.   Constitutional:       General: He is not in acute distress.     Appearance: He is well-developed.   HENT:      Head: Normocephalic and atraumatic.   Eyes:      General: No scleral icterus.     Pupils: Pupils are equal, round, and reactive to light.   Neck:      Thyroid: No thyromegaly.   Cardiovascular:      Rate and Rhythm: Regular rhythm.      Pulses: Normal pulses.      Heart sounds: Normal heart sounds. No murmur heard.    No friction rub. No gallop.   Pulmonary:      Effort: Pulmonary effort is normal.      Breath sounds: Normal breath sounds.   Abdominal:      General: Bowel sounds are normal. There is no distension.      Palpations: Abdomen is soft.      Tenderness: There is no abdominal tenderness.   Musculoskeletal:      Cervical back: Neck supple.   Skin:     General: Skin is warm and dry.      Findings: No rash.   Neurological:      Mental Status: He is alert and oriented to person, place, and time.   Psychiatric:         Behavior: Behavior normal.           Assessment:       1. Supraventricular tachycardia    2. Atrial fibrillation, unspecified type    3. Primary hypertension    4. H/O kidney transplant         Plan:       In summary, Ibrahima Phelps is a 66M with a history of PAF. His [id number] Score is 3 (age, HTN, CAD) so he should continue eliquis at current dosing.    Mr. Phelps is complaining of worsening fatigue and DAUGHERTY. Plan is to reduce flecainide to 50 mg bid (CrCl <35), reduce toprol xl to 12.5 mg daily, follow-up 6 months.    Thank you for allowing me to participate in the care of this patient. Please do not hesitate to call me with any questions or concerns.

## 2022-06-13 ENCOUNTER — OFFICE VISIT (OUTPATIENT)
Dept: ELECTROPHYSIOLOGY | Facility: CLINIC | Age: 67
End: 2022-06-13
Payer: MEDICARE

## 2022-06-13 ENCOUNTER — HOSPITAL ENCOUNTER (OUTPATIENT)
Dept: CARDIOLOGY | Facility: CLINIC | Age: 67
Discharge: HOME | End: 2022-06-13
Payer: MEDICARE

## 2022-06-13 VITALS
DIASTOLIC BLOOD PRESSURE: 50 MMHG | BODY MASS INDEX: 31.3 KG/M2 | WEIGHT: 236.13 LBS | HEART RATE: 78 BPM | SYSTOLIC BLOOD PRESSURE: 115 MMHG | HEIGHT: 73 IN

## 2022-06-13 DIAGNOSIS — I48.91 ATRIAL FIBRILLATION, UNSPECIFIED TYPE: ICD-10-CM

## 2022-06-13 DIAGNOSIS — I47.10 SUPRAVENTRICULAR TACHYCARDIA: Primary | ICD-10-CM

## 2022-06-13 DIAGNOSIS — Z94.0 H/O KIDNEY TRANSPLANT: ICD-10-CM

## 2022-06-13 DIAGNOSIS — I49.8 OTHER SPECIFIED CARDIAC ARRHYTHMIAS: ICD-10-CM

## 2022-06-13 DIAGNOSIS — I10 PRIMARY HYPERTENSION: ICD-10-CM

## 2022-06-13 PROCEDURE — 99999 PR PBB SHADOW E&M-EST. PATIENT-LVL III: CPT | Mod: PBBFAC,,, | Performed by: INTERNAL MEDICINE

## 2022-06-13 PROCEDURE — 3078F DIAST BP <80 MM HG: CPT | Mod: CPTII,S$GLB,, | Performed by: INTERNAL MEDICINE

## 2022-06-13 PROCEDURE — 3078F PR MOST RECENT DIASTOLIC BLOOD PRESSURE < 80 MM HG: ICD-10-PCS | Mod: CPTII,S$GLB,, | Performed by: INTERNAL MEDICINE

## 2022-06-13 PROCEDURE — 3074F SYST BP LT 130 MM HG: CPT | Mod: CPTII,S$GLB,, | Performed by: INTERNAL MEDICINE

## 2022-06-13 PROCEDURE — 93010 ELECTROCARDIOGRAM REPORT: CPT | Mod: S$GLB,,, | Performed by: INTERNAL MEDICINE

## 2022-06-13 PROCEDURE — 99214 OFFICE O/P EST MOD 30 MIN: CPT | Mod: S$GLB,,, | Performed by: INTERNAL MEDICINE

## 2022-06-13 PROCEDURE — 1101F PR PT FALLS ASSESS DOC 0-1 FALLS W/OUT INJ PAST YR: ICD-10-PCS | Mod: CPTII,S$GLB,, | Performed by: INTERNAL MEDICINE

## 2022-06-13 PROCEDURE — 3074F PR MOST RECENT SYSTOLIC BLOOD PRESSURE < 130 MM HG: ICD-10-PCS | Mod: CPTII,S$GLB,, | Performed by: INTERNAL MEDICINE

## 2022-06-13 PROCEDURE — 1159F MED LIST DOCD IN RCRD: CPT | Mod: CPTII,S$GLB,, | Performed by: INTERNAL MEDICINE

## 2022-06-13 PROCEDURE — 3288F FALL RISK ASSESSMENT DOCD: CPT | Mod: CPTII,S$GLB,, | Performed by: INTERNAL MEDICINE

## 2022-06-13 PROCEDURE — 3044F HG A1C LEVEL LT 7.0%: CPT | Mod: CPTII,S$GLB,, | Performed by: INTERNAL MEDICINE

## 2022-06-13 PROCEDURE — 93005 ELECTROCARDIOGRAM TRACING: CPT | Mod: S$GLB,,, | Performed by: INTERNAL MEDICINE

## 2022-06-13 PROCEDURE — 1101F PT FALLS ASSESS-DOCD LE1/YR: CPT | Mod: CPTII,S$GLB,, | Performed by: INTERNAL MEDICINE

## 2022-06-13 PROCEDURE — 3288F PR FALLS RISK ASSESSMENT DOCUMENTED: ICD-10-PCS | Mod: CPTII,S$GLB,, | Performed by: INTERNAL MEDICINE

## 2022-06-13 PROCEDURE — 3008F PR BODY MASS INDEX (BMI) DOCUMENTED: ICD-10-PCS | Mod: CPTII,S$GLB,, | Performed by: INTERNAL MEDICINE

## 2022-06-13 PROCEDURE — 3008F BODY MASS INDEX DOCD: CPT | Mod: CPTII,S$GLB,, | Performed by: INTERNAL MEDICINE

## 2022-06-13 PROCEDURE — 1159F PR MEDICATION LIST DOCUMENTED IN MEDICAL RECORD: ICD-10-PCS | Mod: CPTII,S$GLB,, | Performed by: INTERNAL MEDICINE

## 2022-06-13 PROCEDURE — 3066F NEPHROPATHY DOC TX: CPT | Mod: CPTII,S$GLB,, | Performed by: INTERNAL MEDICINE

## 2022-06-13 PROCEDURE — 1126F PR PAIN SEVERITY QUANTIFIED, NO PAIN PRESENT: ICD-10-PCS | Mod: CPTII,S$GLB,, | Performed by: INTERNAL MEDICINE

## 2022-06-13 PROCEDURE — 93005 RHYTHM STRIP: ICD-10-PCS | Mod: S$GLB,,, | Performed by: INTERNAL MEDICINE

## 2022-06-13 PROCEDURE — 99214 PR OFFICE/OUTPT VISIT, EST, LEVL IV, 30-39 MIN: ICD-10-PCS | Mod: S$GLB,,, | Performed by: INTERNAL MEDICINE

## 2022-06-13 PROCEDURE — 3044F PR MOST RECENT HEMOGLOBIN A1C LEVEL <7.0%: ICD-10-PCS | Mod: CPTII,S$GLB,, | Performed by: INTERNAL MEDICINE

## 2022-06-13 PROCEDURE — 1126F AMNT PAIN NOTED NONE PRSNT: CPT | Mod: CPTII,S$GLB,, | Performed by: INTERNAL MEDICINE

## 2022-06-13 PROCEDURE — 3066F PR DOCUMENTATION OF TREATMENT FOR NEPHROPATHY: ICD-10-PCS | Mod: CPTII,S$GLB,, | Performed by: INTERNAL MEDICINE

## 2022-06-13 PROCEDURE — 93010 RHYTHM STRIP: ICD-10-PCS | Mod: S$GLB,,, | Performed by: INTERNAL MEDICINE

## 2022-06-13 PROCEDURE — 99999 PR PBB SHADOW E&M-EST. PATIENT-LVL III: ICD-10-PCS | Mod: PBBFAC,,, | Performed by: INTERNAL MEDICINE

## 2022-06-13 RX ORDER — METOPROLOL SUCCINATE 25 MG/1
12.5 TABLET, EXTENDED RELEASE ORAL DAILY
Qty: 45 TABLET | Refills: 3 | Status: SHIPPED | OUTPATIENT
Start: 2022-06-13 | End: 2022-08-10 | Stop reason: SDUPTHER

## 2022-06-13 RX ORDER — FLECAINIDE ACETATE 50 MG/1
50 TABLET ORAL EVERY 12 HOURS
Qty: 180 TABLET | Refills: 3 | Status: ON HOLD | OUTPATIENT
Start: 2022-06-13 | End: 2023-03-06 | Stop reason: HOSPADM

## 2022-06-13 RX ORDER — BNT162B2 0.23 MG/2.25ML
INJECTION, SUSPENSION INTRAMUSCULAR
COMMUNITY
Start: 2022-04-11 | End: 2022-11-17 | Stop reason: ALTCHOICE

## 2022-06-15 ENCOUNTER — PATIENT MESSAGE (OUTPATIENT)
Dept: CARDIOLOGY | Facility: CLINIC | Age: 67
End: 2022-06-15
Payer: MEDICARE

## 2022-06-16 ENCOUNTER — TELEPHONE (OUTPATIENT)
Dept: NEUROLOGY | Facility: CLINIC | Age: 67
End: 2022-06-16

## 2022-06-16 NOTE — TELEPHONE ENCOUNTER
Spoke with pt and cancelled appt on 7/7 with Lily. He is aware our nurse navigator will reach out to schedule him with general neurology.

## 2022-06-17 ENCOUNTER — TELEPHONE (OUTPATIENT)
Dept: NEUROLOGY | Facility: CLINIC | Age: 67
End: 2022-06-17

## 2022-06-20 ENCOUNTER — OFFICE VISIT (OUTPATIENT)
Dept: OTOLARYNGOLOGY | Facility: CLINIC | Age: 67
End: 2022-06-20
Payer: MEDICARE

## 2022-06-20 DIAGNOSIS — H81.11 BENIGN PAROXYSMAL POSITIONAL VERTIGO OF RIGHT EAR: Primary | ICD-10-CM

## 2022-06-20 DIAGNOSIS — H61.23 BILATERAL IMPACTED CERUMEN: ICD-10-CM

## 2022-06-20 PROCEDURE — 3066F PR DOCUMENTATION OF TREATMENT FOR NEPHROPATHY: ICD-10-PCS | Mod: CPTII,S$GLB,ICN, | Performed by: NURSE PRACTITIONER

## 2022-06-20 PROCEDURE — 1159F MED LIST DOCD IN RCRD: CPT | Mod: CPTII,S$GLB,ICN, | Performed by: NURSE PRACTITIONER

## 2022-06-20 PROCEDURE — 99999 PR PBB SHADOW E&M-EST. PATIENT-LVL IV: CPT | Mod: PBBFAC,,, | Performed by: NURSE PRACTITIONER

## 2022-06-20 PROCEDURE — 1126F PR PAIN SEVERITY QUANTIFIED, NO PAIN PRESENT: ICD-10-PCS | Mod: CPTII,S$GLB,ICN, | Performed by: NURSE PRACTITIONER

## 2022-06-20 PROCEDURE — 1160F PR REVIEW ALL MEDS BY PRESCRIBER/CLIN PHARMACIST DOCUMENTED: ICD-10-PCS | Mod: CPTII,S$GLB,ICN, | Performed by: NURSE PRACTITIONER

## 2022-06-20 PROCEDURE — 95992 CANALITH REPOSITIONING PROC: CPT | Mod: S$GLB,ICN,, | Performed by: NURSE PRACTITIONER

## 2022-06-20 PROCEDURE — 3288F FALL RISK ASSESSMENT DOCD: CPT | Mod: CPTII,S$GLB,ICN, | Performed by: NURSE PRACTITIONER

## 2022-06-20 PROCEDURE — 99214 OFFICE O/P EST MOD 30 MIN: CPT | Mod: 25,S$GLB,ICN, | Performed by: NURSE PRACTITIONER

## 2022-06-20 PROCEDURE — 99214 PR OFFICE/OUTPT VISIT, EST, LEVL IV, 30-39 MIN: ICD-10-PCS | Mod: 25,S$GLB,ICN, | Performed by: NURSE PRACTITIONER

## 2022-06-20 PROCEDURE — 1101F PT FALLS ASSESS-DOCD LE1/YR: CPT | Mod: CPTII,S$GLB,ICN, | Performed by: NURSE PRACTITIONER

## 2022-06-20 PROCEDURE — 1126F AMNT PAIN NOTED NONE PRSNT: CPT | Mod: CPTII,S$GLB,ICN, | Performed by: NURSE PRACTITIONER

## 2022-06-20 PROCEDURE — 1160F RVW MEDS BY RX/DR IN RCRD: CPT | Mod: CPTII,S$GLB,ICN, | Performed by: NURSE PRACTITIONER

## 2022-06-20 PROCEDURE — 69210 EAR CERUMEN REMOVAL: ICD-10-PCS | Mod: S$GLB,ICN,, | Performed by: NURSE PRACTITIONER

## 2022-06-20 PROCEDURE — 95992 PR CANALITH REPOSITIONING PROCEDURE, PER DAY: ICD-10-PCS | Mod: S$GLB,ICN,, | Performed by: NURSE PRACTITIONER

## 2022-06-20 PROCEDURE — 3044F PR MOST RECENT HEMOGLOBIN A1C LEVEL <7.0%: ICD-10-PCS | Mod: CPTII,S$GLB,ICN, | Performed by: NURSE PRACTITIONER

## 2022-06-20 PROCEDURE — 99999 PR PBB SHADOW E&M-EST. PATIENT-LVL IV: ICD-10-PCS | Mod: PBBFAC,,, | Performed by: NURSE PRACTITIONER

## 2022-06-20 PROCEDURE — 69210 REMOVE IMPACTED EAR WAX UNI: CPT | Mod: S$GLB,ICN,, | Performed by: NURSE PRACTITIONER

## 2022-06-20 PROCEDURE — 3044F HG A1C LEVEL LT 7.0%: CPT | Mod: CPTII,S$GLB,ICN, | Performed by: NURSE PRACTITIONER

## 2022-06-20 PROCEDURE — 1159F PR MEDICATION LIST DOCUMENTED IN MEDICAL RECORD: ICD-10-PCS | Mod: CPTII,S$GLB,ICN, | Performed by: NURSE PRACTITIONER

## 2022-06-20 PROCEDURE — 1101F PR PT FALLS ASSESS DOC 0-1 FALLS W/OUT INJ PAST YR: ICD-10-PCS | Mod: CPTII,S$GLB,ICN, | Performed by: NURSE PRACTITIONER

## 2022-06-20 PROCEDURE — 3288F PR FALLS RISK ASSESSMENT DOCUMENTED: ICD-10-PCS | Mod: CPTII,S$GLB,ICN, | Performed by: NURSE PRACTITIONER

## 2022-06-20 PROCEDURE — 3066F NEPHROPATHY DOC TX: CPT | Mod: CPTII,S$GLB,ICN, | Performed by: NURSE PRACTITIONER

## 2022-06-20 NOTE — PROCEDURES
Ear Cerumen Removal    Date/Time: 6/20/2022 1:30 PM  Performed by: Maris Watson DNP, FNP-C  Authorized by: Maris Watson DNP, FNP-C     Consent Done?:  Yes (Verbal)    Local anesthetic:  None  Location details:  Both ears  Procedure type: curette    Cerumen  Removal Results:  Cerumen completely removed  Patient tolerance:  Patient tolerated the procedure well with no immediate complications     Procedure Note:    The patient was brought to the minor procedure room and placed under the operating microscope of the left ear canal which was cleaned of ceruminous debris. Using a combination of suction, curettes and cup forceps the patient's cerumen impaction was removed. The tympanic membrane was evaluated and was unremarkable. The patient tolerated the procedure well. There were no complications.    Procedure Note:    Patient was brought to the minor procedure room and using the operating microscope of the right ear canal which was cleaned of ceruminous debris. There was a significant cerumen impaction.  Using a combination of suction, curettes and cup forceps the patient's cerumen impaction was removed. Tympanic membrane intact. Pt tolerated well. There were no complications.

## 2022-06-20 NOTE — PATIENT INSTRUCTIONS
"Patient Instruction After Office Treatments (Epley or Semont maneuvers)    After the maneuver is performed wait 10 minutes before going home to avoid quick spins. It takes time for the debris to settle in the correct location (think of a snow globe). Avoid driving yourself home.        Sleep semi-recumbent for the next 24-48 hours.  Sleep at a 45 degree angle (eg. Chair).  Stay vertical during the day.  Do not got to the dentist or hairdresser.       No exercise for a week.    For 24-48 hours, avoid up and down head movements (this includes nodding) and avoid bending over or bending down. If you drop something and are unable to squat down to reach it, then have someone pick it up for you or leave it there. You may turn your head left and right slowly, but avoid sudden head movements left or right for 24-48 hours.  Use a couple of pillows when you sleep for the next 24-48 hours.  Avoid sleeping on the "bad" side.    Wait at least 2 days before doing the Bautista-Tara exercise or home epley maneuver unless otherwise instructed by your physician. If you are symptomatic, you may do the exercise 3 times to try to alleviate symptoms. If you are corrected today in office and are no longer having vertigo with head movements, then do the exercise once 2 times daily for 1 week prophylactically. Complete the exercises 3 times before bed that way if you are dizzy symptoms can resolve while sleeping. Repeat every night for a week.    At one week post-treatment, put yourself in the position that usually makes you dizzy under conditions in which you can't fall or hurt yourself. Let your doctor know how you did.      Helpful Websites and Links:    Home Epley Maneuver for BPPV    Video Link:  [email or web address]  ([facility])            "

## 2022-06-20 NOTE — PROGRESS NOTES
Subjective:      Ibrahima Phelps is a 66 y.o. male who was self-referred for aural fullness.    Mr. Phelps reports having fulness in both ears. He denies ear pain or drainage. He also reports having vertigo that he describes as spinning which occurs when he turns his head to the right when laying down. He denies hearing loss or change in hearing. No change in vision or speech.  There is not a family history of hearing loss at a young age.  There is not a prior history of ear surgery.  There is not a prior history of ear infections .  He denies a history of significant noise exposure.  He does not wear hearing aids currently.  He has not had a hearing test recently.     Past Medical History  He has a past medical history of (HFpEF) heart failure with preserved ejection fraction, Atrial fibrillation, CAD (coronary artery disease), CHF (congestive heart failure), Chronic diastolic heart failure, CKD (chronic kidney disease) stage 3, GFR 30-59 ml/min, Diverticulosis, Fever blister, Heart attack, Hyperlipidemia, Hypertension, Immunodeficiency due to treatment with immunosuppressive medication, Keloid cicatrix, Metabolic bone disease, Pericarditis, S/P kidney transplant, Supraventricular tachycardia, Thrombocytopenia, Thyroid disease, and Unspecified disorder of kidney and ureter.    Past Surgical History  He has a past surgical history that includes Cholecystectomy; Cardioversion (04/30/15); Kidney transplant (2005); Parathyroidectomy; Colonoscopy (April 20, 2011); Colonoscopy (N/A, 3/13/2020); Colonoscopy (N/A, 2/4/2021); Colonoscopy (N/A, 3/3/2021); Coronary angioplasty with stent (9/13/2006); and Treatment of cardiac arrhythmia (N/A, 3/28/2022).    Family History  His family history includes Heart disease in his father; Kidney disease in his brother and sister; Kidney failure in his brother and sister; No Known Problems in his brother, sister, sister, and sister; Thyroid disease in his mother.    Social History  He  reports that he quit smoking about 3 months ago. His smoking use included cigarettes. He has a 20.00 pack-year smoking history. He has never used smokeless tobacco. He reports that he does not drink alcohol and does not use drugs.    Allergies  He is allergic to ace inhibitors, verapamil, and povidone-iodine.    Medications  He has a current medication list which includes the following prescription(s): albuterol, ammonium lactate, apixaban, aspirin, atorvastatin, calcium carbonate, doxazosin, famotidine, fexofenadine, flecainide, fluticasone propionate, furosemide, gabapentin, hydralazine, metoprolol succinate, multivitamin, mycophenolate, nicotine, nifedipine, paricalcitol, pfizer covid-19 seymour vaccn(pf), potassium chloride, prednisone, spironolactone, tacrolimus, and vitamin d.    Review of Systems   Constitutional: Positive for chills. Negative for fatigue and fever.   HENT: Positive for sinus pressure and voice change. Negative for congestion, facial swelling, nosebleeds, postnasal drip, rhinorrhea, sinus pain, sneezing, sore throat and tinnitus.    Eyes: Positive for itching. Negative for photophobia, redness and visual disturbance.   Respiratory: Positive for shortness of breath and wheezing. Negative for apnea, cough and stridor.    Cardiovascular: Negative.  Negative for chest pain and palpitations.   Gastrointestinal: Positive for constipation. Negative for diarrhea, nausea and vomiting.   Endocrine: Negative.    Genitourinary: Negative.  Negative for decreased urine volume, dysuria and frequency.   Musculoskeletal: Positive for back pain. Negative for arthralgias, myalgias and neck stiffness.   Skin: Negative.  Negative for rash and wound.   Allergic/Immunologic: Negative.  Negative for environmental allergies, food allergies and immunocompromised state.   Neurological: Positive for dizziness and headaches. Negative for syncope, weakness and light-headedness.   Hematological: Negative for adenopathy.  Bruises/bleeds easily.   Psychiatric/Behavioral: Negative.  Negative for confusion, decreased concentration and sleep disturbance.          Objective:     There were no vitals taken for this visit.     Constitutional:   Vital signs are normal. He appears well-developed and well-nourished.     Head:  Normocephalic and atraumatic.     Ears:    Right Ear: No lacerations. No drainage, swelling or tenderness. No foreign bodies. No mastoid tenderness. Tympanic membrane is not injected, not scarred, not perforated, not erythematous, not retracted and not bulging. Tympanic membrane mobility is normal. No middle ear effusion. No hemotympanum.   Left Ear: No lacerations. No drainage, swelling or tenderness. No foreign bodies. No mastoid tenderness. Tympanic membrane is not injected, not scarred, not perforated, not erythematous, not retracted and not bulging. Tympanic membrane mobility is normal.  No middle ear effusion. No hemotympanum.   Ears:    [place]-hallpike test left: negative  Blaise-hallpike test right: positive      Nose:  Nose normal including turbinates, nasal mucosa, sinuses and nasal septum.     Mouth/Throat  Lips, teeth, and gums normal and oropharynx normal.     Neck:  Neck normal without thyromegaly masses, asymmetry, normal tracheal structure, crepitus, and tenderness and no adenopathy.     Psychiatric:   He has a normal mood and affect.       Procedure    Cerumen removal performed.  See procedure note.        Data Reviewed    WBC (K/uL)   Date Value   06/05/2022 11.36     Platelets (K/uL)   Date Value   06/05/2022 161      Creatinine (mg/dL)   Date Value   06/05/2022 2.8 (H)     TSH (uIU/mL)   Date Value   09/16/2021 1.260     Glucose (mg/dL)   Date Value   06/05/2022 109     Hemoglobin A1C (%)   Date Value   06/05/2022 5.9 (H)              Assessment:     1. Benign paroxysmal positional vertigo of right ear    2. 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  We discussed the pathology of BPPV including the displacement of crystals (canaliths) within the inner ear.  Epley maneuver can be performed in-office, but may require multiple treatments for success.         -     Right Canalith Repositioning procedure performed in clinic.        -     Provided him with at home Epley maneuver instructions.        -     2 week follow-up if vertigo persistents.    Bilateral impacted cerumen  -     Ear Cerumen Removal      Follow up if symptoms worsen or fail to improve.      36 minutes of total time spent on the encounter, which includes face to face time and non-face to face time preparing to see the patient (eg, review of tests), Obtaining and/or reviewing separately obtained history, Documenting clinical information in the electronic or other health record, Independently interpreting results (not separately reported) and communicating results to the patient/family/caregiver, or Care coordination (not separately reported).

## 2022-06-21 ENCOUNTER — TELEPHONE (OUTPATIENT)
Dept: TRANSPLANT | Facility: CLINIC | Age: 67
End: 2022-06-21
Payer: MEDICARE

## 2022-06-21 DIAGNOSIS — Z94.0 KIDNEY REPLACED BY TRANSPLANT: Primary | ICD-10-CM

## 2022-06-21 NOTE — TELEPHONE ENCOUNTER
[employer] message sent with below instructions.     ----- Message from Amy Bryant MD sent at 6/21/2022  3:08 PM CDT -----  Regarding: RE: greater than 5 year patient  I agree with CKD management:    Please check lab one more time in 2 weeks.   continue good hydration and good BP control.     Any proteinuria?    ----- Message -----  From: Mandie Lomeli NP  Sent: 6/21/2022   2:16 PM CDT  To: Amy Bryant MD, Siobhan Kidd NP, #  Subject: RE: greater than 5 year patient                    Amy please review and see what else may be needed.     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 He recently had a slight elevation. Scr increased to ~ 3.0 but after repeat and hydration he is now  scr 2.8    5/2022 allograft US   Impression:  Elevated arterial resistive indices which may represent transplant rejection, calcineurin inhibitor toxicity, or medical renal disease    I'm leaning more t/w CKD care and Gen nephrology f/u  since its been 17 + years ....  or biopsy?     Mandie     ----- Message -----  From: Bella Carrasco RN  Sent: 6/21/2022   1:47 PM CDT  To: Siobhan Kidd NP, Mandie Lomeli NP  Subject: greater than 5 year patient                      Ghanshyam de la torre,   I just got Mr. Phelps from [facility]. He saw Siobhan in may and it looks like his creatinine had gone up to the high 2s low 3s. He got a transplant ultrasound after his visit with siobhan and repeat labs. What other follow up do we need to do for him? Or do we just make sure he's following with general nephrology and close his transplant episode.     Thank you,  Bella

## 2022-06-22 ENCOUNTER — PATIENT MESSAGE (OUTPATIENT)
Dept: TRANSPLANT | Facility: CLINIC | Age: 67
End: 2022-06-22
Payer: MEDICARE

## 2022-06-27 ENCOUNTER — TELEPHONE (OUTPATIENT)
Dept: NEPHROLOGY | Facility: CLINIC | Age: 67
End: 2022-06-27
Payer: MEDICARE

## 2022-06-27 RX ORDER — COLCHICINE 0.6 MG/1
CAPSULE ORAL
Qty: 15 CAPSULE | Refills: 11 | Status: SHIPPED | OUTPATIENT
Start: 2022-06-27 | End: 2023-07-11

## 2022-06-27 NOTE — TELEPHONE ENCOUNTER
----- Message from Kurtis Perez sent at 6/27/2022  8:39 AM CDT -----  Contact: wife/ ms young  Type: Needs Medical Advice  Who Called: Mrs. Young/wife  Pharmacy name and phone #:   [id number] - [place] - [address]  [address]  [place] [address]  Phone: [phone] Fax: [phone]  Best Call Back Number: [phone] (home)   Additional Information: Patients wife wants to know could she get a call back from nurse mcdaniel about Pt refill on a prescription called Colcrys pertaining to his Gout. Pt wife just wants half refilled. The medication is no longer on his medication chart that's why she's calling to get that request in. Plz call pt back to get confirmed.

## 2022-06-27 NOTE — TELEPHONE ENCOUNTER
Patient was called back and reported having seafood last weekend. He has a bad flare up of gout and would like Colcrys. He cannot afford a 30 day supply so please only send a 1/2 Rx.

## 2022-06-28 ENCOUNTER — LAB VISIT (OUTPATIENT)
Dept: PRIMARY CARE CLINIC | Facility: CLINIC | Age: 67
End: 2022-06-28
Attending: NURSE PRACTITIONER
Payer: MEDICARE

## 2022-06-28 DIAGNOSIS — Z94.0 KIDNEY REPLACED BY TRANSPLANT: ICD-10-CM

## 2022-06-28 LAB
ALBUMIN SERPL BCP-MCNC: 3.2 G/DL (ref 3.5–5.2)
ANION GAP SERPL CALC-SCNC: 8 MMOL/L (ref 8–16)
BUN SERPL-MCNC: 32 MG/DL (ref 8–23)
CALCIUM SERPL-MCNC: 8.9 MG/DL (ref 8.7–10.5)
CHLORIDE SERPL-SCNC: 105 MMOL/L (ref 95–110)
CO2 SERPL-SCNC: 26 MMOL/L (ref 23–29)
CREAT SERPL-MCNC: 2.8 MG/DL (ref 0.5–1.4)
EST. GFR  (AFRICAN AMERICAN): 25.8 ML/MIN/1.73 M^2
EST. GFR  (NON AFRICAN AMERICAN): 22.3 ML/MIN/1.73 M^2
GLUCOSE SERPL-MCNC: 97 MG/DL (ref 70–110)
PHOSPHATE SERPL-MCNC: 4.3 MG/DL (ref 2.7–4.5)
POTASSIUM SERPL-SCNC: 4.4 MMOL/L (ref 3.5–5.1)
SODIUM SERPL-SCNC: 139 MMOL/L (ref 136–145)

## 2022-06-28 PROCEDURE — 80069 RENAL FUNCTION PANEL: CPT | Performed by: NURSE PRACTITIONER

## 2022-06-29 ENCOUNTER — TELEPHONE (OUTPATIENT)
Dept: NEPHROLOGY | Facility: CLINIC | Age: 67
End: 2022-06-29
Payer: MEDICARE

## 2022-06-29 ENCOUNTER — OFFICE VISIT (OUTPATIENT)
Dept: NEPHROLOGY | Facility: CLINIC | Age: 67
End: 2022-06-29
Payer: MEDICARE

## 2022-06-29 ENCOUNTER — TELEPHONE (OUTPATIENT)
Dept: TRANSPLANT | Facility: CLINIC | Age: 67
End: 2022-06-29
Payer: MEDICARE

## 2022-06-29 VITALS
BODY MASS INDEX: 31.28 KG/M2 | HEART RATE: 59 BPM | WEIGHT: 236 LBS | SYSTOLIC BLOOD PRESSURE: 122 MMHG | DIASTOLIC BLOOD PRESSURE: 60 MMHG | HEIGHT: 73 IN | OXYGEN SATURATION: 96 %

## 2022-06-29 DIAGNOSIS — Z94.0 KIDNEY REPLACED BY TRANSPLANT: ICD-10-CM

## 2022-06-29 DIAGNOSIS — R06.02 SOB (SHORTNESS OF BREATH): Primary | ICD-10-CM

## 2022-06-29 DIAGNOSIS — Z87.891 FORMER SMOKER: ICD-10-CM

## 2022-06-29 DIAGNOSIS — Z94.0 IMMUNOSUPPRESSIVE MANAGEMENT ENCOUNTER FOLLOWING KIDNEY TRANSPLANT: ICD-10-CM

## 2022-06-29 DIAGNOSIS — D50.8 OTHER IRON DEFICIENCY ANEMIA: ICD-10-CM

## 2022-06-29 DIAGNOSIS — M89.8X9 METABOLIC BONE DISEASE: ICD-10-CM

## 2022-06-29 DIAGNOSIS — D63.1 ANEMIA DUE TO STAGE 3B CHRONIC KIDNEY DISEASE: ICD-10-CM

## 2022-06-29 DIAGNOSIS — M79.671 RIGHT FOOT PAIN: ICD-10-CM

## 2022-06-29 DIAGNOSIS — E66.9 OBESITY (BMI 30-39.9): ICD-10-CM

## 2022-06-29 DIAGNOSIS — Z86.79 HISTORY OF PERICARDITIS: ICD-10-CM

## 2022-06-29 DIAGNOSIS — I48.0 PAROXYSMAL ATRIAL FIBRILLATION: ICD-10-CM

## 2022-06-29 DIAGNOSIS — I73.9 PAD (PERIPHERAL ARTERY DISEASE): ICD-10-CM

## 2022-06-29 DIAGNOSIS — M79.671 RIGHT FOOT PAIN: Primary | ICD-10-CM

## 2022-06-29 DIAGNOSIS — E78.2 MIXED HYPERLIPIDEMIA: ICD-10-CM

## 2022-06-29 DIAGNOSIS — R60.0 BILATERAL LEG EDEMA: ICD-10-CM

## 2022-06-29 DIAGNOSIS — N25.81 SECONDARY RENAL HYPERPARATHYROIDISM: ICD-10-CM

## 2022-06-29 DIAGNOSIS — I13.0 HYPERTENSIVE HEART AND RENAL DISEASE WITH RENAL FAILURE, STAGE 1 THROUGH STAGE 4 OR UNSPECIFIED CHRONIC KIDNEY DISEASE, WITH HEART FAILURE: ICD-10-CM

## 2022-06-29 DIAGNOSIS — I25.10 CORONARY ARTERY DISEASE INVOLVING NATIVE CORONARY ARTERY OF NATIVE HEART WITHOUT ANGINA PECTORIS: ICD-10-CM

## 2022-06-29 DIAGNOSIS — R73.03 PREDIABETES: ICD-10-CM

## 2022-06-29 DIAGNOSIS — I47.10 SUPRAVENTRICULAR TACHYCARDIA: ICD-10-CM

## 2022-06-29 DIAGNOSIS — I50.30 HEART FAILURE WITH PRESERVED EJECTION FRACTION, UNSPECIFIED HF CHRONICITY: ICD-10-CM

## 2022-06-29 DIAGNOSIS — I50.32 CHRONIC DIASTOLIC HEART FAILURE: ICD-10-CM

## 2022-06-29 DIAGNOSIS — R60.0 BILATERAL LEG EDEMA: Primary | ICD-10-CM

## 2022-06-29 DIAGNOSIS — N18.32 ANEMIA DUE TO STAGE 3B CHRONIC KIDNEY DISEASE: ICD-10-CM

## 2022-06-29 DIAGNOSIS — Z79.899 IMMUNOSUPPRESSIVE MANAGEMENT ENCOUNTER FOLLOWING KIDNEY TRANSPLANT: ICD-10-CM

## 2022-06-29 DIAGNOSIS — E55.9 VITAMIN D DEFICIENCY: ICD-10-CM

## 2022-06-29 DIAGNOSIS — N18.4 CKD (CHRONIC KIDNEY DISEASE) STAGE 4, GFR 15-29 ML/MIN: Primary | ICD-10-CM

## 2022-06-29 PROCEDURE — 99499 RISK ADDL DX/OHS AUDIT: ICD-10-PCS | Mod: S$GLB,,, | Performed by: INTERNAL MEDICINE

## 2022-06-29 PROCEDURE — 3008F PR BODY MASS INDEX (BMI) DOCUMENTED: ICD-10-PCS | Mod: CPTII,S$GLB,, | Performed by: INTERNAL MEDICINE

## 2022-06-29 PROCEDURE — 1159F PR MEDICATION LIST DOCUMENTED IN MEDICAL RECORD: ICD-10-PCS | Mod: CPTII,S$GLB,, | Performed by: INTERNAL MEDICINE

## 2022-06-29 PROCEDURE — 3288F FALL RISK ASSESSMENT DOCD: CPT | Mod: CPTII,S$GLB,, | Performed by: INTERNAL MEDICINE

## 2022-06-29 PROCEDURE — 1126F AMNT PAIN NOTED NONE PRSNT: CPT | Mod: CPTII,S$GLB,, | Performed by: INTERNAL MEDICINE

## 2022-06-29 PROCEDURE — 3044F HG A1C LEVEL LT 7.0%: CPT | Mod: CPTII,S$GLB,, | Performed by: INTERNAL MEDICINE

## 2022-06-29 PROCEDURE — 1101F PT FALLS ASSESS-DOCD LE1/YR: CPT | Mod: CPTII,S$GLB,, | Performed by: INTERNAL MEDICINE

## 2022-06-29 PROCEDURE — 3078F PR MOST RECENT DIASTOLIC BLOOD PRESSURE < 80 MM HG: ICD-10-PCS | Mod: CPTII,S$GLB,, | Performed by: INTERNAL MEDICINE

## 2022-06-29 PROCEDURE — 3008F BODY MASS INDEX DOCD: CPT | Mod: CPTII,S$GLB,, | Performed by: INTERNAL MEDICINE

## 2022-06-29 PROCEDURE — 1101F PR PT FALLS ASSESS DOC 0-1 FALLS W/OUT INJ PAST YR: ICD-10-PCS | Mod: CPTII,S$GLB,, | Performed by: INTERNAL MEDICINE

## 2022-06-29 PROCEDURE — 99215 PR OFFICE/OUTPT VISIT, EST, LEVL V, 40-54 MIN: ICD-10-PCS | Mod: S$GLB,,, | Performed by: INTERNAL MEDICINE

## 2022-06-29 PROCEDURE — 3066F PR DOCUMENTATION OF TREATMENT FOR NEPHROPATHY: ICD-10-PCS | Mod: CPTII,S$GLB,, | Performed by: INTERNAL MEDICINE

## 2022-06-29 PROCEDURE — 3074F PR MOST RECENT SYSTOLIC BLOOD PRESSURE < 130 MM HG: ICD-10-PCS | Mod: CPTII,S$GLB,, | Performed by: INTERNAL MEDICINE

## 2022-06-29 PROCEDURE — 99215 OFFICE O/P EST HI 40 MIN: CPT | Mod: S$GLB,,, | Performed by: INTERNAL MEDICINE

## 2022-06-29 PROCEDURE — 1159F MED LIST DOCD IN RCRD: CPT | Mod: CPTII,S$GLB,, | Performed by: INTERNAL MEDICINE

## 2022-06-29 PROCEDURE — 99499 UNLISTED E&M SERVICE: CPT | Mod: S$GLB,,, | Performed by: INTERNAL MEDICINE

## 2022-06-29 PROCEDURE — 3288F PR FALLS RISK ASSESSMENT DOCUMENTED: ICD-10-PCS | Mod: CPTII,S$GLB,, | Performed by: INTERNAL MEDICINE

## 2022-06-29 PROCEDURE — 3066F NEPHROPATHY DOC TX: CPT | Mod: CPTII,S$GLB,, | Performed by: INTERNAL MEDICINE

## 2022-06-29 PROCEDURE — 3074F SYST BP LT 130 MM HG: CPT | Mod: CPTII,S$GLB,, | Performed by: INTERNAL MEDICINE

## 2022-06-29 PROCEDURE — 99999 PR PBB SHADOW E&M-EST. PATIENT-LVL V: CPT | Mod: PBBFAC,,, | Performed by: INTERNAL MEDICINE

## 2022-06-29 PROCEDURE — 3078F DIAST BP <80 MM HG: CPT | Mod: CPTII,S$GLB,, | Performed by: INTERNAL MEDICINE

## 2022-06-29 PROCEDURE — 3044F PR MOST RECENT HEMOGLOBIN A1C LEVEL <7.0%: ICD-10-PCS | Mod: CPTII,S$GLB,, | Performed by: INTERNAL MEDICINE

## 2022-06-29 PROCEDURE — 99999 PR PBB SHADOW E&M-EST. PATIENT-LVL V: ICD-10-PCS | Mod: PBBFAC,,, | Performed by: INTERNAL MEDICINE

## 2022-06-29 PROCEDURE — 1126F PR PAIN SEVERITY QUANTIFIED, NO PAIN PRESENT: ICD-10-PCS | Mod: CPTII,S$GLB,, | Performed by: INTERNAL MEDICINE

## 2022-06-29 NOTE — LETTER
June 29, 2022    Ibrahima Phelps  [address] [facility] [address] [facility] [address]      Congratulations on the continued success of your kidney transplant!   Your transplant team has determined that the care and monitoring of your kidney can now be maintained by your local nephrologist and health care team.     Please Continue to:  ? Drink adequate amounts of water  ? Keep your prescriptions current  ? Take medication on time and dont miss any doses  ? See your local providers routinely  ? Keep up with health maintenance that your primary care provider recommends  ? Report problems to your local doctors in a timely manner  ? Follow your providers recommendations for any health concerns or emergencies    Medication:  ? All Prescriptions, including your transplant medication, will be filled by your local provider     Lab and Office Visits:  ? Routine lab and office visits with transplant are no longer needed  ? We recommend that your local nephrologist continue routine monitoring of your kidney function by checking your bloodwork regularly, including immunosuppression levels  ? Continue to see your local nephrologist, primary care and other providers regularly to quickly address any problems which may arise    We are here for you:  ? The health of you and your kidney are very important to us  ? We are always available for questions that you or your providers may have    Wishing you many more years of health and wellness!  Sincerely,    [facility]  [facility] [place] [address] [phone]

## 2022-06-29 NOTE — TELEPHONE ENCOUNTER
done    ----- Message from Amy Bryant MD sent at 6/21/2022  3:10 PM CDT -----  Regarding: RE: greater than 5 year patient  Agree. One more time lab to make sure it will not be KATINA on CKD.   ----- Message -----  From: Siobhan Kidd NP  Sent: 6/21/2022   2:29 PM CDT  To: Amy Bryant MD, Mandie Lomeli NP, #  Subject: RE: greater than 5 year patient                  Dr. Latif has been following and has upcoming appointment on 6/29/22. This past year he has been in Afib and SVT. Needing cardioversion and now on flecainide. Since then his baseline Cr is 2.5. When I saw him in May his Cr was 3.5. We obtain repeat labs and TXP US. US was acceptable. He most recent Cr 2.8. I would say this is progression of CKD.      ----- Message -----  From: Mandie Lomeli NP  Sent: 6/21/2022   2:16 PM CDT  To: Amy Bryant MD, Siobhan Kidd NP, #  Subject: RE: greater than 5 year patient                    Amy please review and see what else may be needed.     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 He recently had a slight elevation. Scr increased to ~ 3.0 but after repeat and hydration he is now  scr 2.8    5/2022 allograft US   Impression:  Elevated arterial resistive indices which may represent transplant rejection, calcineurin inhibitor toxicity, or medical renal disease    I'm leaning more t/w CKD care and Gen nephrology f/u  since its been 17 + years ....  or biopsy?     Mandie     ----- Message -----  From: Bella Carrasco RN  Sent: 6/21/2022   1:47 PM CDT  To: Siobhan Kidd NP, Mandie Lomeli NP  Subject: greater than 5 year patient                      Ghanshyam de la torre,   I just got Mr. Phelps from [facility]. He saw Siobhan in may and it looks like his creatinine had gone up to the high 2s low 3s. He got a transplant ultrasound after his visit with siobhan and repeat labs. What other follow up do we need to do for him? Or do we  just make sure he's following with general nephrology and close his transplant episode.     Thank you,  eBlla

## 2022-06-29 NOTE — PROGRESS NOTES
Subjective:       Patient ID: Ibrahima Phelps is a 66 y.o. Black or  male who presents for follow-up evaluation of Chronic Kidney Disease and Kidney Transplant    HPI   He is getting over an URI, has not 'gone to his chest' but now feeling better. He continues to smoke a few cigarettes a day. Off PPI for several months now and without dyspepsia. No LE edema and no SOB.  He's noticed some allograft 'twinges' but no overt pain but no problems with urination. His wife is still working at [employer] as a manager of housing for [employer].     Interval history March 2019: he has three concerns:  1. Worsening tremors. Spilling corn and trouble with writing  2. Increased nocturia  3. Cold intolerance    He denies recent illness. No allogarft pain and no LUTS other than nocturia. He has unintentionally lost weight. No new medications. He is doing well off PPI. He is smoking 3 cigarettes to 3/4ppd depending on the day.     Interval history July 2019: he was hospitalized for CP and was found to be in SVT, his potassium was low in ER. He was CP free in ER after X 1 SL NTG and after SVT broke. Since then he feels well. He has lost a little bit of weight. Still smoking     Interval history Nov 2019: feels poorly--has URI and lost voice, no sick contacts. Kidney txp is managing Prograf--Cr is higher than baseline but Prograf is running high. No LUTS and no allograft pain.     Interval history April 2020:  The patient location is: Home  The chief complaint leading to consultation is: CKD and kidney transplant recioient  Visit type: audiovisual  Total time spent with patient:  28 minutes  Each patient to whom he or she provides medical services by telemedicine is:  (1) informed of the relationship between the physician and patient and the respective role of any other health care provider with respect to management of the patient; and (2) notified that he or she may decline to receive medical services by telemedicine and  may withdraw from such care at any time.  Notes: He is doing well. He stays home, his wife does the shopping and errands. He was admitted to the hospital in late March for chest pain, felt to be musculoskeletal. They started hydralazine for BP, lowered too much at home with symptoms and Cards advised to cut in half. No LE edema. No allograft pain and no LUTS. He still takes his calcium as instructed.   Interval lhistory Aug 2020:  He feels well. No hospital stays since last visit. He notes a skin lesion to right wrist, not pruritic, he noted similar spots when he was on coumadin but not as many when he was on Eliquis, it is actually improving. It  doesn't justice. He had one day of allograft tenderness but no LUTS associated with it and no recurrence. No new medications.     Interval history Jan 2021:   The patient location is: Home  The chief complaint leading to consultation is: CKD and kidney transplant   Visit type: audiovisual  Face to Face time with patient: 32 minutes  51 minutes of total time spent on the encounter, which includes face to face time and non-face to face time preparing to see the patient (eg, review of tests), Obtaining and/or reviewing separately obtained history, Documenting clinical information in the electronic or other health record, Independently interpreting results (not separately reported) and communicating results to the patient/family/caregiver, or Care coordination (not separately reported).   Each patient to whom he or she provides medical services by telemedicine is:  (1) informed of the relationship between the physician and patient and the respective role of any other health care provider with respect to management of the patient; and (2) notified that he or she may decline to receive medical services by telemedicine and may withdraw from such care at any time.  Notes: He notes BP is high. He feels well. He remains feeling cold most of the time. No new medications.     Interval  history July 2021: He is doing well. Brings a BP log with the average above goal. No CP     Interval History Jan 2022: He is feeling well. No allograft pain. No recent ER visits. Tremors about the same--saw Neuro     Interval History June 2022: He saw ENT and diagnosed with BPV, iproving. New baseline allograft function, has seen txp very closely. RLE edema. Has history of smoking, QUIT in Feb 2022       Review of Systems   Constitutional: Negative for activity change, appetite change, fatigue, fever and unexpected weight change.   HENT: Negative for facial swelling.    Respiratory: Negative for cough and shortness of breath.    Cardiovascular: Negative for chest pain and leg swelling.   Gastrointestinal: Negative for abdominal pain.   Endocrine: Negative for polyuria.   Genitourinary: Positive for frequency. Negative for decreased urine volume, difficulty urinating, dysuria and hematuria.   Musculoskeletal: Positive for arthralgias and back pain. Negative for joint swelling.   Allergic/Immunologic: Positive for environmental allergies and immunocompromised state.   Neurological: Positive for tremors. Negative for dizziness and seizures.   Hematological: Does not bruise/bleed easily.   Psychiatric/Behavioral: Negative for confusion, decreased concentration and sleep disturbance. The patient is not nervous/anxious.        Objective:      Physical Exam  Vitals and nursing note reviewed.   Constitutional:       General: He is not in acute distress.     Appearance: Normal appearance. He is normal weight. He is not ill-appearing.   HENT:      Head: Atraumatic.      Nose: No congestion.   Eyes:      General: No scleral icterus.  Cardiovascular:      Rate and Rhythm: Normal rate.      Heart sounds:     No friction rub.   Pulmonary:      Effort: No respiratory distress.   Abdominal:      General: There is distension.   Musculoskeletal:      Right lower leg: No edema.      Left lower leg: No edema.   Skin:     Coloration:  Skin is not jaundiced.   Neurological:      General: No focal deficit present.      Mental Status: He is alert and oriented to person, place, and time. Mental status is at baseline.   Psychiatric:         Mood and Affect: Mood normal.         Behavior: Behavior normal.         Thought Content: Thought content normal.         Judgment: Judgment normal.         Assessment:       1. CKD (chronic kidney disease) stage 4, GFR 15-29 ml/min    2. Kidney replaced by transplant    3. Bilateral leg edema    4. Right foot pain    5. Former smoker    6. Immunosuppressive management encounter following kidney transplant    7. Vitamin D deficiency    8. Prediabetes    9. Metabolic bone disease    10. Secondary renal hyperparathyroidism    11. Heart failure with preserved ejection fraction, unspecified HF chronicity    12. Supraventricular tachycardia    13. Chronic diastolic heart failure    14. Mixed hyperlipidemia    15. Paroxysmal atrial fibrillation    16. Coronary artery disease involving native coronary artery of native heart without angina pectoris    17. Anemia due to stage 3b chronic kidney disease    18. History of pericarditis    19. Other iron deficiency anemia    20. Obesity (BMI 30-39.9)    21. 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 Stable allograft function with new baseline creatinine 2.5-2.9mg/dL. Tac level at goal (4-6)     CKD with subnephrotic proteinuria--stable    HTN--controlled    Hyperglycemia post transplant, steroid induced/PreDiabetes--stable/improved    Mineral and Bone Disease--continue current treatment with calcium supplements, Zemplar and D3    Refer to Pulmonary for SOB in former smoker  Refer to Podiatry for painful R ankle, get u/s and XRAY    RTC 4 months with labs prior    59 minutes of total time spent on the encounter,  which includes face to face time and non-face to face time preparing to see the patient (eg, review of tests), Obtaining and/or reviewing separately obtained history, Documenting clinical information in the electronic or other health record, Independently interpreting results (not separately reported) and communicating results to the patient/family/caregiver, or Care coordination (not separately reported).

## 2022-06-29 NOTE — TELEPHONE ENCOUNTER
Letter sent. Transplant episode closed      ----- Message from Lorena Baron NP sent at 6/28/2022  3:43 PM CDT -----  17 years post transplant. Ensure he is following with general nephrology for CKD care

## 2022-06-30 ENCOUNTER — PATIENT MESSAGE (OUTPATIENT)
Dept: NEPHROLOGY | Facility: CLINIC | Age: 67
End: 2022-06-30
Payer: MEDICARE

## 2022-06-30 ENCOUNTER — TELEPHONE (OUTPATIENT)
Dept: PULMONOLOGY | Facility: CLINIC | Age: 67
End: 2022-06-30
Payer: MEDICARE

## 2022-06-30 ENCOUNTER — PATIENT MESSAGE (OUTPATIENT)
Dept: PULMONOLOGY | Facility: CLINIC | Age: 67
End: 2022-06-30
Payer: MEDICARE

## 2022-06-30 DIAGNOSIS — R06.02 SOB (SHORTNESS OF BREATH): Primary | ICD-10-CM

## 2022-06-30 NOTE — TELEPHONE ENCOUNTER
Patient needs an appointment for pulmonology for shortness of breath. Please contact due to private schedule.

## 2022-06-30 NOTE — TELEPHONE ENCOUNTER
Patient scheduled in August for Pulmonology. Podiatry for 7/12. All other testing ordered as well.

## 2022-06-30 NOTE — TELEPHONE ENCOUNTER
Patient scheduled for podiatry next month. All ultrasound and xrays are scheduled as well. Pulmonology appointment pending due to private schedule.

## 2022-06-30 NOTE — TELEPHONE ENCOUNTER
Please schedule 3 radiology exams: LE us of arteries, LE of veins and Xray of R foot, orders in. Please refer to Podiatry, Ty

## 2022-07-04 PROBLEM — Z87.891 FORMER SMOKER: Status: ACTIVE | Noted: 2022-07-04

## 2022-07-05 ENCOUNTER — HOSPITAL ENCOUNTER (OUTPATIENT)
Dept: RADIOLOGY | Facility: OTHER | Age: 67
Discharge: HOME | End: 2022-07-05
Attending: INTERNAL MEDICINE
Payer: MEDICARE

## 2022-07-05 DIAGNOSIS — M79.671 RIGHT FOOT PAIN: ICD-10-CM

## 2022-07-05 DIAGNOSIS — R60.0 BILATERAL LEG EDEMA: ICD-10-CM

## 2022-07-05 PROCEDURE — 93970 US LOWER EXTREMITY VEINS BILATERAL: ICD-10-PCS | Mod: 26,,, | Performed by: RADIOLOGY

## 2022-07-05 PROCEDURE — 73630 X-RAY EXAM OF FOOT: CPT | Mod: 26,RT,, | Performed by: RADIOLOGY

## 2022-07-05 PROCEDURE — 93970 EXTREMITY STUDY: CPT | Mod: TC

## 2022-07-05 PROCEDURE — 73630 X-RAY EXAM OF FOOT: CPT | Mod: TC,FY,RT

## 2022-07-05 PROCEDURE — 73630 XR FOOT COMPLETE 3 VIEW RIGHT: ICD-10-PCS | Mod: 26,RT,, | Performed by: RADIOLOGY

## 2022-07-05 PROCEDURE — 93970 EXTREMITY STUDY: CPT | Mod: 26,,, | Performed by: RADIOLOGY

## 2022-07-12 ENCOUNTER — OFFICE VISIT (OUTPATIENT)
Dept: PODIATRY | Facility: CLINIC | Age: 67
End: 2022-07-12
Payer: MEDICARE

## 2022-07-12 VITALS
HEART RATE: 59 BPM | SYSTOLIC BLOOD PRESSURE: 165 MMHG | HEIGHT: 73 IN | RESPIRATION RATE: 18 BRPM | BODY MASS INDEX: 31.28 KG/M2 | WEIGHT: 236 LBS | DIASTOLIC BLOOD PRESSURE: 71 MMHG

## 2022-07-12 DIAGNOSIS — M79.671 RIGHT FOOT PAIN: ICD-10-CM

## 2022-07-12 DIAGNOSIS — M10.9 GOUT OF RIGHT ANKLE, UNSPECIFIED CAUSE, UNSPECIFIED CHRONICITY: Primary | ICD-10-CM

## 2022-07-12 PROCEDURE — 1159F PR MEDICATION LIST DOCUMENTED IN MEDICAL RECORD: ICD-10-PCS | Mod: CPTII,S$GLB,, | Performed by: PODIATRIST

## 2022-07-12 PROCEDURE — 1160F PR REVIEW ALL MEDS BY PRESCRIBER/CLIN PHARMACIST DOCUMENTED: ICD-10-PCS | Mod: CPTII,S$GLB,, | Performed by: PODIATRIST

## 2022-07-12 PROCEDURE — 1159F MED LIST DOCD IN RCRD: CPT | Mod: CPTII,S$GLB,, | Performed by: PODIATRIST

## 2022-07-12 PROCEDURE — 99999 PR PBB SHADOW E&M-EST. PATIENT-LVL V: ICD-10-PCS | Mod: PBBFAC,,, | Performed by: PODIATRIST

## 2022-07-12 PROCEDURE — 3044F HG A1C LEVEL LT 7.0%: CPT | Mod: CPTII,S$GLB,, | Performed by: PODIATRIST

## 2022-07-12 PROCEDURE — 3066F PR DOCUMENTATION OF TREATMENT FOR NEPHROPATHY: ICD-10-PCS | Mod: CPTII,S$GLB,, | Performed by: PODIATRIST

## 2022-07-12 PROCEDURE — 99999 PR PBB SHADOW E&M-EST. PATIENT-LVL V: CPT | Mod: PBBFAC,,, | Performed by: PODIATRIST

## 2022-07-12 PROCEDURE — 3078F DIAST BP <80 MM HG: CPT | Mod: CPTII,S$GLB,, | Performed by: PODIATRIST

## 2022-07-12 PROCEDURE — 1126F AMNT PAIN NOTED NONE PRSNT: CPT | Mod: CPTII,S$GLB,, | Performed by: PODIATRIST

## 2022-07-12 PROCEDURE — 3008F PR BODY MASS INDEX (BMI) DOCUMENTED: ICD-10-PCS | Mod: CPTII,S$GLB,, | Performed by: PODIATRIST

## 2022-07-12 PROCEDURE — 3077F SYST BP >= 140 MM HG: CPT | Mod: CPTII,S$GLB,, | Performed by: PODIATRIST

## 2022-07-12 PROCEDURE — 1160F RVW MEDS BY RX/DR IN RCRD: CPT | Mod: CPTII,S$GLB,, | Performed by: PODIATRIST

## 2022-07-12 PROCEDURE — 3078F PR MOST RECENT DIASTOLIC BLOOD PRESSURE < 80 MM HG: ICD-10-PCS | Mod: CPTII,S$GLB,, | Performed by: PODIATRIST

## 2022-07-12 PROCEDURE — 1126F PR PAIN SEVERITY QUANTIFIED, NO PAIN PRESENT: ICD-10-PCS | Mod: CPTII,S$GLB,, | Performed by: PODIATRIST

## 2022-07-12 PROCEDURE — 99213 OFFICE O/P EST LOW 20 MIN: CPT | Mod: S$GLB,,, | Performed by: PODIATRIST

## 2022-07-12 PROCEDURE — 3066F NEPHROPATHY DOC TX: CPT | Mod: CPTII,S$GLB,, | Performed by: PODIATRIST

## 2022-07-12 PROCEDURE — 3077F PR MOST RECENT SYSTOLIC BLOOD PRESSURE >= 140 MM HG: ICD-10-PCS | Mod: CPTII,S$GLB,, | Performed by: PODIATRIST

## 2022-07-12 PROCEDURE — 99213 PR OFFICE/OUTPT VISIT, EST, LEVL III, 20-29 MIN: ICD-10-PCS | Mod: S$GLB,,, | Performed by: PODIATRIST

## 2022-07-12 PROCEDURE — 3044F PR MOST RECENT HEMOGLOBIN A1C LEVEL <7.0%: ICD-10-PCS | Mod: CPTII,S$GLB,, | Performed by: PODIATRIST

## 2022-07-12 PROCEDURE — 3008F BODY MASS INDEX DOCD: CPT | Mod: CPTII,S$GLB,, | Performed by: PODIATRIST

## 2022-07-12 NOTE — PROGRESS NOTES
"Subjective:      Patient ID: Ibrahima Phelps is a 66 y.o. male.    Chief Complaint:   Foot Problem (Foot swelling no more pain , had GOUT last week  )    Ibrahima is a 66 y.o. male who presents to the podiatry clinic  with complaint of  right foot issue. States he has hx of gout that flared up about 1 year ago and 3 weeks ago he ate boiled seafood which caused a severe flare up of redness, pain and his ankle/foot felt like a taser was being used on it. He had Colcrys at home and took 3 tablets and the swelling and pain went away completely. Has had steroid injections in the past as well for gout. Overall states he is doing better except for his skin is darker and dry where he had most swelling. No other complaints at this time.     Vitals:    07/12/22 1547   BP: (!) 165/71   Pulse: (!) 59   Resp: 18   Weight: 107 kg (236 lb)   Height: 6' 1" (1.854 m)   PainSc: 0-No pain        Past Medical History:   Diagnosis Date    (HFpEF) heart failure with preserved ejection fraction 9/25/2017    Atrial fibrillation     CAD (coronary artery disease)     CHF (congestive heart failure)     Chronic diastolic heart failure 4/8/2020    CKD (chronic kidney disease) stage 3, GFR 30-59 ml/min     Dr. Latif    CKD (chronic kidney disease) stage 3, GFR 30-59 ml/min     Diverticulosis     Fever blister     Heart attack 2006    Hyperlipidemia     Hypertension     Immunodeficiency due to treatment with immunosuppressive medication     Keloid cicatrix     Metabolic bone disease     Pericarditis     S/P kidney transplant     Supraventricular tachycardia 06/2019    Thrombocytopenia     Thyroid disease     Tobacco use 9/5/2014    Unspecified disorder of kidney and ureter     Stage IIICKD     Past Surgical History:   Procedure Laterality Date    CARDIOVERSION  04/30/15    CHOLECYSTECTOMY      COLONOSCOPY  April 20, 2011    Diverticulosis, repeat recommended in 3 yrs., repeat colonoscopy 2014 revealed 2 polyps.  He should " "return in 5 years.    COLONOSCOPY N/A 3/13/2020    Procedure: COLONOSCOPY;  Surgeon: Chon Casper MD;  Location: [facility] (4TH FLR);  Service: Endoscopy;  Laterality: N/A;  ok to hold coumadin x5days-see telephone encounter 2/4/20-tb    COLONOSCOPY N/A 2/4/2021    Procedure: COLONOSCOPY;  Surgeon: Chon Casper MD;  Location: [facility] (4TH FLR);  Service: Endoscopy;  Laterality: N/A;  Eliquis - per Dr. GAVIN Blunt ok to hold x 2 days and "restarted asap"- ERW  last prep "poor", [id number] ordered/ Pt requests Dr. Casper  prep ins. mailed - COVID screening on 2/1/21 PCW- ERW    COLONOSCOPY N/A 3/3/2021    Procedure: COLONOSCOPY;  Surgeon: Chon Casper MD;  Location: [facility] (4TH FLR);  Service: Endoscopy;  Laterality: N/A;  Patient with his second poor bowel pre and has poor prep today.  If patient not intersted or can't get colonoscopy tomorrow will need constipation bowel prep and will need to restart his Eliquis today.Thanks,Chon     per Dr Blunt-ok to hold Eliquis 2 days prior      COVID     CORONARY ANGIOPLASTY WITH STENT PLACEMENT  9/13/2006    KIDNEY TRANSPLANT  2005    PARATHYROIDECTOMY      TREATMENT OF CARDIAC ARRHYTHMIA N/A 3/28/2022    Procedure: CARDIOVERSION;  Surgeon: Migue Blunt MD;  Location: [facility] EP LAB;  Service: Cardiology;  Laterality: N/A;  AF, DCC, MAC, GP, 3 PREP*RODRIGO deferred pt 100% compliant*     Current Outpatient Medications on File Prior to Visit   Medication Sig Dispense Refill    albuterol (VENTOLIN HFA) 90 mcg/actuation inhaler Inhale 2 puffs into the lungs every 6 (six) hours as needed for Wheezing or Shortness of Breath. Rescue 18 g 3    ammonium lactate 12 % Crea Apply 1 g topically once daily. 140 g 1    apixaban (ELIQUIS) 5 mg Tab Take 1 tablet (5 mg total) by mouth 2 (two) times daily. 180 tablet 0    aspirin (ECOTRIN) 81 MG EC tablet Take 1 tablet by mouth Daily.      atorvastatin (LIPITOR) 20 MG tablet Take 1 tablet (20 mg total) by mouth once " daily. 90 tablet 3    calcium carbonate (OS-TRISH) 500 mg calcium (1,250 mg) tablet TAKE 2 TABLETS BY MOUTH TWICE A  tablet 3    colchicine (MITIGARE) 0.6 mg Cap One po BID prn gout flare up to 3 days 15 capsule 11    doxazosin (CARDURA) 4 MG tablet TAKE 1 TABLET (4 MG TOTAL) BY MOUTH EVERY 12 (TWELVE) HOURS. 180 tablet 3    famotidine (PEPCID) 20 MG tablet Take 1 tablet (20 mg total) by mouth 2 (two) times daily. 180 tablet 3    fexofenadine (ALLEGRA) 60 MG tablet Take 1 tablet by mouth Twice daily as needed.      flecainide (TAMBOCOR) 50 MG Tab Take 1 tablet (50 mg total) by mouth every 12 (twelve) hours. 180 tablet 3    fluticasone propionate (FLONASE) 50 mcg/actuation nasal spray 1 spray (50 mcg total) by Each Nostril route once daily. 18.2 mL 0    furosemide (LASIX) 40 MG tablet Take 0.5 tablets (20 mg total) by mouth 2 (two) times daily. 180 tablet 3    gabapentin (NEURONTIN) 300 MG capsule TAKE 1 CAPSULE TWICE DAILY 180 capsule 2    hydrALAZINE (APRESOLINE) 50 MG tablet Take 1 tablet (50 mg total) by mouth 3 (three) times daily. 270 tablet 3    metoprolol succinate (TOPROL-XL) 25 MG 24 hr tablet Take 0.5 tablets (12.5 mg total) by mouth once daily. 45 tablet 3    multivitamin (THERAGRAN) per tablet Take 1 tablet by mouth Daily.      mycophenolate (CELLCEPT) 250 mg Cap Take 2 capsules (500 mg total) by mouth 2 (two) times daily. 360 capsule 3    nicotine (NICODERM CQ) 21 mg/24 hr Place 1 patch onto the skin once daily. 90 patch 3    NIFEdipine (PROCARDIA-XL) 60 MG (OSM) 24 hr tablet Take 1 tablet (60 mg total) by mouth 2 (two) times a day. 180 tablet 3    paricalcitoL (ZEMPLAR) 1 MCG capsule TAKE 1 TABLET BY MOUTH ON MONDAY, WEDNESDAY, & FRIDAY 36 capsule 3    PFIZER COVID-19 FREDO VACCN,PF, 30 mcg/0.3 mL injection       potassium chloride (KLOR-CON 10) 10 MEQ TbSR Take 1 tablet (10 mEq total) by mouth once daily. 90 tablet 4    predniSONE (DELTASONE) 5 MG tablet TAKE 1 TABLET BY MOUTH  "EVERY DAY IN THE MORNING 90 tablet 3    spironolactone (ALDACTONE) 25 MG tablet Take 1 tablet (25 mg total) by mouth once daily. 90 tablet 3    tacrolimus (PROGRAF) 1 MG Cap Two po in am and two po in pm 270 capsule 3    vitamin D 1000 units Tab Take 370 mg by mouth 2 (two) times daily. 2 tablets BID       No current facility-administered medications on file prior to visit.     Review of patient's allergies indicates:   Allergen Reactions    Ace inhibitors Swelling    Verapamil Other (See Comments)     Other reaction(s): Unknown    Povidone-iodine Itching       Review of Systems   Constitutional: Negative for chills and fever.   Cardiovascular: Negative for chest pain, claudication and leg swelling.   Respiratory: Negative for cough and shortness of breath.    Skin: Positive for color change and dry skin. Negative for nail changes.   Musculoskeletal: Positive for gout.   Gastrointestinal: Negative for nausea and vomiting.   Neurological: Negative for numbness and paresthesias.   Psychiatric/Behavioral: Negative for altered mental status.           Objective:       Vitals:    07/12/22 1547   BP: (!) 165/71   Pulse: (!) 59   Resp: 18   Weight: 107 kg (236 lb)   Height: 6' 1" (1.854 m)   PainSc: 0-No pain   107 kg (236 lb)     Physical Exam  Vitals reviewed.   Constitutional:       General: He is not in acute distress.     Appearance: He is well-developed. He is not ill-appearing, toxic-appearing or diaphoretic.      Comments: Proper supportive shoegear      Cardiovascular:      Pulses:           Dorsalis pedis pulses are 2+ on the right side and 2+ on the left side.        Posterior tibial pulses are 1+ on the right side and 1+ on the left side.   Musculoskeletal:         General: No swelling.      Right lower leg: No edema.      Left lower leg: No edema.      Right ankle: Normal. No swelling.      Right Achilles Tendon: Normal. No tenderness.      Left ankle: Normal. No swelling.      Left Achilles Tendon: " Normal. No tenderness.      Right foot: Normal range of motion. Tenderness present. No deformity, prominent metatarsal heads or bony tenderness.      Left foot: Normal range of motion. Tenderness present. No deformity, prominent metatarsal heads or bony tenderness.      Comments: No pain on palpation or ROM of R ankle/foot joints. No swelling or deformity noted to R ankle/foot.    Feet:      Right foot:      Skin integrity: Dry skin present. No ulcer, blister, skin breakdown, erythema, warmth or callus.      Toenail Condition: Right toenails are normal.      Left foot:      Skin integrity: Dry skin present. No ulcer, blister, skin breakdown, erythema, warmth or callus.      Toenail Condition: Left toenails are normal.      Comments: [place] monofilament intact    Focal hyperkeratotic lesion with painful central core consisting entirely of hyperkeratotic tissue without open skin, drainage, pus, fluctuance, malodor, or signs of infection:  Dorsal lateral left foot just dorsal to central 5th metatarsal no signs of foreign body    Focal hyperkeratotic lesion consisting entirely of hyperkeratotic tissue without open skin, drainage, pus, fluctuance, malodor, or signs of infection sub IPJ right hallux, 5th metatarsophalangeal joint right    No signs of verruca    Skin:     General: Skin is warm.      Capillary Refill: Capillary refill takes 2 to 3 seconds.      Coloration: Skin is not pale.      Findings: No erythema or rash.      Nails: There is no clubbing.      Comments: Hyperpigmentation noted to anterior ankle R with associated xerosis. No blistering, open wound, ecchymosis or bruising noted to R foot.    Neurological:      Mental Status: He is alert and oriented to person, place, and time.      Sensory: No sensory deficit.      Gait: Gait abnormal.   Psychiatric:         Attention and Perception: Attention normal.         Mood and Affect: Mood normal.         Speech: Speech normal.         Behavior: Behavior  normal.         Thought Content: Thought content normal.         Cognition and Memory: Cognition normal.         Judgment: Judgment normal.               Assessment:       Encounter Diagnoses   Name Primary?    Right foot pain     Gout of right ankle, unspecified cause, unspecified chronicity Yes         Plan:       Ibrahima was seen today for foot problem.    Diagnoses and all orders for this visit:    Gout of right ankle, unspecified cause, unspecified chronicity    Right foot pain  -     Ambulatory referral/consult to Podiatry      I counseled the patient on his conditions, their implications and medical management.    The nature of gout is fully explained, including dietary relationship, acute and interval phase and treatment of both. Long term complications such as kidney stones, tophi and arthritis are discussed. Avoidance of alcohol recommended, and written literature is given along with a low purine diet. Indications for the use of allopurinol for prophylaxis and the use of colchicine to prevent or treat flare-ups is also discussed. Proper use of indomethacin for acute attacks discussed, and its side effects. Call if further attacks occur, or this one does not resolve promptly.    Ok to take Colcrys as needed/directed for flare ups.     Gout diet discussed.     Discussed regular and routine moisturizer to skin of both feet to help improve dry skin. Advised to apply twice daily until resolution of symptoms. Avoid between toes.     Follow up as needed. Consider steroid if flare up occurs.

## 2022-07-14 ENCOUNTER — HOSPITAL ENCOUNTER (OUTPATIENT)
Dept: RADIOLOGY | Facility: OTHER | Age: 67
Discharge: HOME | End: 2022-07-14
Attending: INTERNAL MEDICINE
Payer: MEDICARE

## 2022-07-14 ENCOUNTER — HOSPITAL ENCOUNTER (EMERGENCY)
Facility: OTHER | Age: 67
Discharge: HOME | End: 2022-07-14
Attending: EMERGENCY MEDICINE
Payer: MEDICARE

## 2022-07-14 VITALS
HEIGHT: 73 IN | RESPIRATION RATE: 20 BRPM | TEMPERATURE: 99 F | WEIGHT: 235 LBS | BODY MASS INDEX: 31.14 KG/M2 | OXYGEN SATURATION: 100 % | DIASTOLIC BLOOD PRESSURE: 71 MMHG | HEART RATE: 58 BPM | SYSTOLIC BLOOD PRESSURE: 150 MMHG

## 2022-07-14 DIAGNOSIS — R60.0 BILATERAL LEG EDEMA: ICD-10-CM

## 2022-07-14 DIAGNOSIS — M79.671 RIGHT FOOT PAIN: ICD-10-CM

## 2022-07-14 DIAGNOSIS — M10.9 ACUTE GOUT OF RIGHT ELBOW, UNSPECIFIED CAUSE: Primary | ICD-10-CM

## 2022-07-14 DIAGNOSIS — I73.9 PAD (PERIPHERAL ARTERY DISEASE): ICD-10-CM

## 2022-07-14 PROCEDURE — 93925 LOWER EXTREMITY STUDY: CPT | Mod: TC

## 2022-07-14 PROCEDURE — 63600175 PHARM REV CODE 636 W HCPCS: Performed by: EMERGENCY MEDICINE

## 2022-07-14 PROCEDURE — 93925 LOWER EXTREMITY STUDY: CPT | Mod: 26,,, | Performed by: RADIOLOGY

## 2022-07-14 PROCEDURE — 93925 US LOWER EXTREMITY ARTERIES BILATERAL: ICD-10-PCS | Mod: 26,,, | Performed by: RADIOLOGY

## 2022-07-14 PROCEDURE — 96372 THER/PROPH/DIAG INJ SC/IM: CPT | Performed by: EMERGENCY MEDICINE

## 2022-07-14 PROCEDURE — 99284 EMERGENCY DEPT VISIT MOD MDM: CPT | Mod: 25

## 2022-07-14 RX ORDER — PREDNISONE 50 MG/1
50 TABLET ORAL DAILY
Qty: 5 TABLET | Refills: 0 | Status: SHIPPED | OUTPATIENT
Start: 2022-07-14 | End: 2022-08-10

## 2022-07-14 RX ORDER — TRIAMCINOLONE ACETONIDE 40 MG/ML
40 INJECTION, SUSPENSION INTRA-ARTICULAR; INTRAMUSCULAR
Status: COMPLETED | OUTPATIENT
Start: 2022-07-14 | End: 2022-07-14

## 2022-07-14 RX ORDER — COLCHICINE 0.6 MG/1
TABLET ORAL
Qty: 3 TABLET | Refills: 0 | Status: SHIPPED | OUTPATIENT
Start: 2022-07-14 | End: 2022-08-10

## 2022-07-14 RX ADMIN — TRIAMCINOLONE ACETONIDE 40 MG: 40 INJECTION, SUSPENSION INTRA-ARTICULAR; INTRAMUSCULAR at 09:07

## 2022-07-14 NOTE — ED TRIAGE NOTES
Pt A&O x4. Even and unlabored respirations. Ambulatory with steady gait. Pt reports right elbow pain 10/10 hx of gout. Swelling noted to right elbow. GCS 15. VSS.

## 2022-07-14 NOTE — DISCHARGE INSTRUCTIONS
We have prescribed you medication for gout. Please fill and take as directed.    Please return to the ER if you have chest pain, difficulty breathing, fevers, altered mental status, dizziness, weakness, or any other concerns.      Follow up with your primary care physician.

## 2022-07-14 NOTE — ED PROVIDER NOTES
Encounter Date: 7/14/2022    SCRIBE #1 NOTE: I, Savannah Uribe, am scribing for, and in the presence of, Kemi Pryor MD.       History     Chief Complaint   Patient presents with    Gout     Pt c/o gout to R elbow, X 1 day, full ROM, +radial pulses.      Time seen by provider: 9:13 AM    This is a 66 y.o. male with PMHx of CAD, HTN, HLD, A-Fib, CHF, CKD who presents with complaint of right elbow pain x 1 day. Patient states that he has a hx of gout and has previously had gout flare ups in the elbow that this feels similar to.  Denies redness.  Denies numbness/tingling.  He admits that he ate boiled seafood last week. He denies taking any gout preventative medications. He denies PMHx of DM. This is the extent of the patient's complaints at this time.    The history is provided by the patient.     Review of patient's allergies indicates:   Allergen Reactions    Ace inhibitors Swelling    Verapamil Other (See Comments)     Other reaction(s): Unknown    Povidone-iodine Itching     Past Medical History:   Diagnosis Date    (HFpEF) heart failure with preserved ejection fraction 9/25/2017    Atrial fibrillation     CAD (coronary artery disease)     CHF (congestive heart failure)     Chronic diastolic heart failure 4/8/2020    CKD (chronic kidney disease) stage 3, GFR 30-59 ml/min     Dr. Latif    CKD (chronic kidney disease) stage 3, GFR 30-59 ml/min     Diverticulosis     Fever blister     Heart attack 2006    Hyperlipidemia     Hypertension     Immunodeficiency due to treatment with immunosuppressive medication     Keloid cicatrix     Metabolic bone disease     Pericarditis     S/P kidney transplant     Supraventricular tachycardia 06/2019    Thrombocytopenia     Thyroid disease     Tobacco use 9/5/2014    Unspecified disorder of kidney and ureter     Stage IIICKD     Past Surgical History:   Procedure Laterality Date    CARDIOVERSION  04/30/15    CHOLECYSTECTOMY      COLONOSCOPY  April  "20, 2011    Diverticulosis, repeat recommended in 3 yrs., repeat colonoscopy 2014 revealed 2 polyps.  He should return in 5 years.    COLONOSCOPY N/A 3/13/2020    Procedure: COLONOSCOPY;  Surgeon: Chon Casper MD;  Location: [facility] (4TH FLR);  Service: Endoscopy;  Laterality: N/A;  ok to hold coumadin x5days-see telephone encounter 2/4/20-tb    COLONOSCOPY N/A 2/4/2021    Procedure: COLONOSCOPY;  Surgeon: Chon Casper MD;  Location: [facility] (4TH FLR);  Service: Endoscopy;  Laterality: N/A;  Eliquis - per Dr. GAVIN Blunt ok to hold x 2 days and "restarted asap"- ERW  last prep "poor", [id number] ordered/ Pt requests Dr. Casper  prep ins. mailed - COVID screening on 2/1/21 PCW- ERW    COLONOSCOPY N/A 3/3/2021    Procedure: COLONOSCOPY;  Surgeon: Chon Casper MD;  [facility]  Service: Endoscopy;  Laterality: N/A;  Patient with his second poor bowel pre and has poor prep today.  If patient not intersted or can't get colonoscopy tomorrow will need constipation bowel prep and will need to restart his Eliquis today.Thanks,Chon Blunt-ok to hold Eliquis 2 days prior      COVID     CORONARY ANGIOPLASTY WITH STENT PLACEMENT  9/13/2006    KIDNEY TRANSPLANT  2005    PARATHYROIDECTOMY      TREATMENT OF CARDIAC ARRHYTHMIA N/A 3/28/2022    Procedure: CARDIOVERSION;  Surgeon: Migue Blunt MD;  Location: [facility] EP LAB;  Service: Cardiology;  Laterality: N/A;  AF, DCC, MAC, GP, 3 PREP*RODRIGO deferred pt 100% compliant*     Family History   Problem Relation Age of Onset    No Known Problems Sister     No Known Problems Brother     Thyroid disease Mother         s/p surgery    Heart disease Father         had pacemaker    No Known Problems Sister     Kidney failure Sister     Kidney disease Sister     No Known Problems Sister     Kidney disease Brother     Kidney failure Brother         s/p transplant    Diabetes Mellitus Neg Hx     Stroke Neg Hx     Heart attack Neg Hx  "    Cancer Neg Hx     Celiac disease Neg Hx     Cirrhosis Neg Hx     Colon cancer Neg Hx     Esophageal cancer Neg Hx     Inflammatory bowel disease Neg Hx     Rectal cancer Neg Hx     Stomach cancer Neg Hx     Ulcerative colitis Neg Hx     Liver disease Neg Hx     Liver cancer Neg Hx     Crohn's disease Neg Hx     Melanoma Neg Hx      Social History     Tobacco Use    Smoking status: Former Smoker     Packs/day: 0.50     Years: 40.00     Pack years: 20.00     Types: Cigarettes     Quit date: 2022     Years since quittin.3    Smokeless tobacco: Never Used    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   Substance Use Topics    Alcohol use: No    Drug use: No     Review of Systems   Constitutional: Negative for chills and fever.   Gastrointestinal: Negative for nausea and vomiting.   Musculoskeletal: Positive for joint swelling. Negative for back pain and neck pain.        +Right elbow pain.   Skin: Positive for color change. Negative for wound.   Neurological: Negative for dizziness and headaches.       Physical Exam     Initial Vitals [22 0837]   BP Pulse Resp Temp SpO2   (!) 167/72 74 (!) 24 98.1 °F (36.7 °C) 100 %      MAP       --         Physical Exam    Nursing note and vitals reviewed.  Constitutional: He appears well-developed and well-nourished.   HENT:   Head: Normocephalic and atraumatic.   Eyes: Conjunctivae are normal.   Neck:   Normal range of motion.  Pulmonary/Chest: No respiratory distress.   Musculoskeletal:         General: Tenderness and edema present.      Cervical back: Normal range of motion.      Comments: Right elbow: Swollen, warm to touch, no erythema noted.  Diffusely tender. Difficulty with full extension secondary to pain. 2+ radial pulse.      Neurological: He is alert and oriented to person, place, and time.   Ambulatory with a steady gait.    Skin: Skin is warm and dry. Capillary refill takes less  than 2 seconds.         ED Course   Procedures  Labs Reviewed - No data to display       Imaging Results    None          Medications   triamcinolone acetonide injection 40 mg (40 mg Intramuscular Given 7/14/22 0939)     Medical Decision Making:   History:   Old Medical Records: I decided to obtain old medical records.  Old Records Summarized: other records and records from clinic visits.  Initial Assessment:   9:13AM:  Patient is a 66-year-old male who presents to the emergency department with right elbow pain.  Patient appears well, nontoxic.  Patient has diffuse swelling, warmth to touch but no erythema.  Patient states that this feels like his typical gout flare.  Patient does not have diabetes.  However he is a renal transplant patient.  Therefore will hold off on NSAIDs and plan for colchicine and steroids.  I do not feel that further work up in the ED is indicated at this time.  I updated pt regarding results and I counseled pt regarding supportive care measures.  I have discussed with the pt ED return warnings and need for close PCP f/u.  Pt agreeable to plan and all questions answered.  I feel that pt is stable for discharge and management as an outpatient and no further intervention is needed at this time.  Pt is comfortable returning to the ED if needed.  Will DC home in stable condition.            Scribe Attestation:   Scribe #1: I performed the above scribed service and the documentation accurately describes the services I performed. I attest to the accuracy of the note.              Physician Attestation for Scribe: I, Kemi Pryor, reviewed documentation as scribed in my presence, which is both accurate and complete.      Clinical Impression:   Final diagnoses:  [M10.9] Acute gout of right elbow, unspecified cause (Primary)          ED Disposition Condition    Discharge Stable        ED Prescriptions     Medication Sig Dispense Start Date End Date Auth. Provider    predniSONE (DELTASONE) 50 MG Tab  Take 1 tablet (50 mg total) by mouth once daily. 5 tablet 7/14/2022  Kemi Pryor MD    colchicine (COLCRYS) 0.6 mg tablet Take 2 tablets at once. Take 1 tablet one hour later. 3 tablet 7/14/2022  Kemi Pryor MD        Follow-up Information     Follow up With Specialties Details Why Contact Info    Connie Magdaleno MD Internal Medicine   [address] [place] [id number]  [phone]             Kemi Pryor MD  07/14/22 0207

## 2022-07-20 DIAGNOSIS — Z94.0 KIDNEY REPLACED BY TRANSPLANT: ICD-10-CM

## 2022-07-20 RX ORDER — TACROLIMUS 1 MG/1
CAPSULE ORAL
Qty: 270 CAPSULE | Refills: 3 | Status: CANCELLED
Start: 2022-07-20

## 2022-07-22 DIAGNOSIS — Z94.0 KIDNEY REPLACED BY TRANSPLANT: ICD-10-CM

## 2022-07-22 RX ORDER — TACROLIMUS 1 MG/1
CAPSULE ORAL
Qty: 270 CAPSULE | Refills: 0 | OUTPATIENT
Start: 2022-07-22

## 2022-07-30 ENCOUNTER — HOSPITAL ENCOUNTER (EMERGENCY)
Facility: HOSPITAL | Age: 67
Discharge: HOME | End: 2022-07-30
Attending: STUDENT IN AN ORGANIZED HEALTH CARE EDUCATION/TRAINING PROGRAM
Payer: MEDICARE

## 2022-07-30 VITALS
DIASTOLIC BLOOD PRESSURE: 84 MMHG | OXYGEN SATURATION: 100 % | HEART RATE: 63 BPM | WEIGHT: 240 LBS | RESPIRATION RATE: 22 BRPM | BODY MASS INDEX: 31.81 KG/M2 | TEMPERATURE: 98 F | SYSTOLIC BLOOD PRESSURE: 191 MMHG | HEIGHT: 73 IN

## 2022-07-30 DIAGNOSIS — I50.32 CHRONIC HEART FAILURE WITH PRESERVED EJECTION FRACTION: ICD-10-CM

## 2022-07-30 DIAGNOSIS — R06.02 SHORTNESS OF BREATH: ICD-10-CM

## 2022-07-30 LAB
ALBUMIN SERPL BCP-MCNC: 3.5 G/DL (ref 3.5–5.2)
ALLENS TEST: ABNORMAL
ALP SERPL-CCNC: 65 U/L (ref 55–135)
ALT SERPL W/O P-5'-P-CCNC: 28 U/L (ref 10–44)
ANION GAP SERPL CALC-SCNC: 11 MMOL/L (ref 8–16)
AST SERPL-CCNC: 21 U/L (ref 10–40)
BASOPHILS # BLD AUTO: 0.01 K/UL (ref 0–0.2)
BASOPHILS NFR BLD: 0.1 % (ref 0–1.9)
BILIRUB SERPL-MCNC: 0.5 MG/DL (ref 0.1–1)
BNP SERPL-MCNC: 577 PG/ML (ref 0–99)
BUN SERPL-MCNC: 34 MG/DL (ref 8–23)
CALCIUM SERPL-MCNC: 7.9 MG/DL (ref 8.7–10.5)
CHLORIDE SERPL-SCNC: 108 MMOL/L (ref 95–110)
CO2 SERPL-SCNC: 20 MMOL/L (ref 23–29)
CREAT SERPL-MCNC: 2.8 MG/DL (ref 0.5–1.4)
DIFFERENTIAL METHOD: ABNORMAL
EOSINOPHIL # BLD AUTO: 0.1 K/UL (ref 0–0.5)
EOSINOPHIL NFR BLD: 1.4 % (ref 0–8)
ERYTHROCYTE [DISTWIDTH] IN BLOOD BY AUTOMATED COUNT: 15.7 % (ref 11.5–14.5)
EST. GFR  (AFRICAN AMERICAN): 25.8 ML/MIN/1.73 M^2
EST. GFR  (NON AFRICAN AMERICAN): 22.3 ML/MIN/1.73 M^2
GLUCOSE SERPL-MCNC: 107 MG/DL (ref 70–110)
HCO3 UR-SCNC: 22.6 MMOL/L (ref 24–28)
HCT VFR BLD AUTO: 28.7 % (ref 40–54)
HGB BLD-MCNC: 8.8 G/DL (ref 14–18)
IMM GRANULOCYTES # BLD AUTO: 0.03 K/UL (ref 0–0.04)
IMM GRANULOCYTES NFR BLD AUTO: 0.4 % (ref 0–0.5)
LYMPHOCYTES # BLD AUTO: 0.8 K/UL (ref 1–4.8)
LYMPHOCYTES NFR BLD: 9.6 % (ref 18–48)
MCH RBC QN AUTO: 25 PG (ref 27–31)
MCHC RBC AUTO-ENTMCNC: 30.7 G/DL (ref 32–36)
MCV RBC AUTO: 82 FL (ref 82–98)
MONOCYTES # BLD AUTO: 0.7 K/UL (ref 0.3–1)
MONOCYTES NFR BLD: 7.9 % (ref 4–15)
NEUTROPHILS # BLD AUTO: 6.7 K/UL (ref 1.8–7.7)
NEUTROPHILS NFR BLD: 80.6 % (ref 38–73)
NRBC BLD-RTO: 0 /100 WBC
PCO2 BLDA: 37.8 MMHG (ref 35–45)
PH SMN: 7.38 [PH] (ref 7.35–7.45)
PLATELET # BLD AUTO: 187 K/UL (ref 150–450)
PMV BLD AUTO: 12 FL (ref 9.2–12.9)
PO2 BLDA: 36 MMHG (ref 40–60)
POC BE: -2 MMOL/L
POC SATURATED O2: 69 % (ref 95–100)
POC TCO2: 24 MMOL/L (ref 24–29)
POTASSIUM SERPL-SCNC: 4.5 MMOL/L (ref 3.5–5.1)
PROT SERPL-MCNC: 7.1 G/DL (ref 6–8.4)
RBC # BLD AUTO: 3.52 M/UL (ref 4.6–6.2)
SAMPLE: ABNORMAL
SARS-COV-2 RDRP RESP QL NAA+PROBE: NEGATIVE
SITE: ABNORMAL
SODIUM SERPL-SCNC: 139 MMOL/L (ref 136–145)
TROPONIN I SERPL DL<=0.01 NG/ML-MCNC: 0.03 NG/ML (ref 0–0.03)
WBC # BLD AUTO: 8.33 K/UL (ref 3.9–12.7)

## 2022-07-30 PROCEDURE — 99900035 HC TECH TIME PER 15 MIN (STAT)

## 2022-07-30 PROCEDURE — 96375 TX/PRO/DX INJ NEW DRUG ADDON: CPT | Mod: 59

## 2022-07-30 PROCEDURE — 80053 COMPREHEN METABOLIC PANEL: CPT | Performed by: EMERGENCY MEDICINE

## 2022-07-30 PROCEDURE — 25000003 PHARM REV CODE 250: Performed by: STUDENT IN AN ORGANIZED HEALTH CARE EDUCATION/TRAINING PROGRAM

## 2022-07-30 PROCEDURE — 82803 BLOOD GASES ANY COMBINATION: CPT

## 2022-07-30 PROCEDURE — 83880 ASSAY OF NATRIURETIC PEPTIDE: CPT | Performed by: EMERGENCY MEDICINE

## 2022-07-30 PROCEDURE — 96374 THER/PROPH/DIAG INJ IV PUSH: CPT

## 2022-07-30 PROCEDURE — U0002 COVID-19 LAB TEST NON-CDC: HCPCS | Performed by: STUDENT IN AN ORGANIZED HEALTH CARE EDUCATION/TRAINING PROGRAM

## 2022-07-30 PROCEDURE — 99285 PR EMERGENCY DEPT VISIT,LEVEL V: ICD-10-PCS | Mod: CS,,, | Performed by: STUDENT IN AN ORGANIZED HEALTH CARE EDUCATION/TRAINING PROGRAM

## 2022-07-30 PROCEDURE — 93010 ELECTROCARDIOGRAM REPORT: CPT | Mod: ,,, | Performed by: INTERNAL MEDICINE

## 2022-07-30 PROCEDURE — 63600175 PHARM REV CODE 636 W HCPCS: Performed by: STUDENT IN AN ORGANIZED HEALTH CARE EDUCATION/TRAINING PROGRAM

## 2022-07-30 PROCEDURE — 99285 EMERGENCY DEPT VISIT HI MDM: CPT | Mod: 25

## 2022-07-30 PROCEDURE — 99285 EMERGENCY DEPT VISIT HI MDM: CPT | Mod: CS,,, | Performed by: STUDENT IN AN ORGANIZED HEALTH CARE EDUCATION/TRAINING PROGRAM

## 2022-07-30 PROCEDURE — 84484 ASSAY OF TROPONIN QUANT: CPT | Performed by: EMERGENCY MEDICINE

## 2022-07-30 PROCEDURE — 93010 EKG 12-LEAD: ICD-10-PCS | Mod: ,,, | Performed by: INTERNAL MEDICINE

## 2022-07-30 PROCEDURE — 63600175 PHARM REV CODE 636 W HCPCS: Performed by: EMERGENCY MEDICINE

## 2022-07-30 PROCEDURE — 85025 COMPLETE CBC W/AUTO DIFF WBC: CPT | Performed by: EMERGENCY MEDICINE

## 2022-07-30 PROCEDURE — 93005 ELECTROCARDIOGRAM TRACING: CPT

## 2022-07-30 RX ORDER — FUROSEMIDE 40 MG/1
40 TABLET ORAL 2 TIMES DAILY
Qty: 10 TABLET | Refills: 0 | Status: SHIPPED | OUTPATIENT
Start: 2022-07-30 | End: 2023-02-06

## 2022-07-30 RX ORDER — HYDRALAZINE HYDROCHLORIDE 20 MG/ML
10 INJECTION INTRAMUSCULAR; INTRAVENOUS
Status: COMPLETED | OUTPATIENT
Start: 2022-07-30 | End: 2022-07-30

## 2022-07-30 RX ORDER — NIFEDIPINE 30 MG/1
60 TABLET, EXTENDED RELEASE ORAL DAILY
Status: DISCONTINUED | OUTPATIENT
Start: 2022-07-30 | End: 2022-07-30 | Stop reason: HOSPADM

## 2022-07-30 RX ORDER — FUROSEMIDE 10 MG/ML
80 INJECTION INTRAMUSCULAR; INTRAVENOUS
Status: COMPLETED | OUTPATIENT
Start: 2022-07-30 | End: 2022-07-30

## 2022-07-30 RX ADMIN — HYDRALAZINE HYDROCHLORIDE 10 MG: 20 INJECTION, SOLUTION INTRAMUSCULAR; INTRAVENOUS at 06:07

## 2022-07-30 RX ADMIN — NIFEDIPINE 60 MG: 30 TABLET, FILM COATED, EXTENDED RELEASE ORAL at 07:07

## 2022-07-30 RX ADMIN — FUROSEMIDE 80 MG: 10 INJECTION, SOLUTION INTRAMUSCULAR; INTRAVENOUS at 07:07

## 2022-07-30 NOTE — ED NOTES
LOC: The patient is awake, alert and aware of environment with an appropriate affect, the patient is oriented x 3 and speaking appropriately.  APPEARANCE: patient is clean and well groomed, patient's clothing is properly fastened.  SKIN: The skin is warm and dry, color consistent with ethnicity  MUSCULOSKELETAL: Patient moving all extremities spontaneously, no obvious swelling or deformities noted.  RESPIRATORY: Airway is open and patent, respirations are spontaneous, patient has a increased effort and rate  ABDOMEN: Soft and non tender to palpation, no distention noted  NEUROLOGIC:  facial expression is symmetrical, patient moving all extremities spontaneously, normal sensation in all extremities when touched with a finger.  Follows all commands appropriately.      Patient arrives from home with the complaint of sob. Patient has auditory wheezing noted with expiratory, patient states he has been sob for the last two days. Patient placed on cardiac monitoring, no acute d distress noted, will continue to monitor

## 2022-07-30 NOTE — ED PROVIDER NOTES
"Encounter Date: 7/30/2022       History     Chief Complaint   Patient presents with    Shortness of Breath     "Short winded and wheezing" x 2 days. Kidney txp in 1992 and 2005.      66-year-old male with history of congestive heart failure, CAD, CHF, kidney transplant on Prograf and CellCept, presents to the ED for shortness of breath.  Patient reports that he started having worsening shortness of breath for last 2 days, associated with cough and wheezing.  Patient feels congested but unable to cough up anything.  He thinks his legs getting bigger, even though, he has been taking his Lasix as normal.  Patient has history of AFib, on Eliquis, no history of DVT or PE. Patient denies fever, chill, chest pain, abdominal pain or dysuria.        Review of patient's allergies indicates:   Allergen Reactions    Ace inhibitors Swelling    Verapamil Other (See Comments)     Other reaction(s): Unknown    Povidone-iodine Itching     Past Medical History:   Diagnosis Date    (HFpEF) heart failure with preserved ejection fraction 9/25/2017    Atrial fibrillation     CAD (coronary artery disease)     CHF (congestive heart failure)     Chronic diastolic heart failure 4/8/2020    CKD (chronic kidney disease) stage 3, GFR 30-59 ml/min     Dr. Latif    CKD (chronic kidney disease) stage 3, GFR 30-59 ml/min     Diverticulosis     Fever blister     Heart attack 2006    Hyperlipidemia     Hypertension     Immunodeficiency due to treatment with immunosuppressive medication     Keloid cicatrix     Metabolic bone disease     Pericarditis     S/P kidney transplant     Supraventricular tachycardia 06/2019    Thrombocytopenia     Thyroid disease     Tobacco use 9/5/2014    Unspecified disorder of kidney and ureter     Stage IIICKD     Past Surgical History:   Procedure Laterality Date    CARDIOVERSION  04/30/15    CHOLECYSTECTOMY      COLONOSCOPY  April 20, 2011    Diverticulosis, repeat recommended in 3 yrs., " "repeat colonoscopy 2014 revealed 2 polyps.  He should return in 5 years.    COLONOSCOPY N/A 3/13/2020    Procedure: COLONOSCOPY;  Surgeon: Chon Casper MD;  Location: [facility] (4TH FLR);  Service: Endoscopy;  Laterality: N/A;  ok to hold coumadin x5days-see telephone encounter 2/4/20-tb    COLONOSCOPY N/A 2/4/2021    Procedure: COLONOSCOPY;  Surgeon: Chon Casper MD;  Location: [facility] (4TH FLR);  Service: Endoscopy;  Laterality: N/A;  Eliquis - per Dr. GAVIN Blunt ok to hold x 2 days and "restarted asap"- ERW  last prep "poor", [id number] ordered/ Pt requests Dr. Casper  prep ins. mailed - COVID screening on 2/1/21 PCW- ERW    COLONOSCOPY N/A 3/3/2021    Procedure: COLONOSCOPY;  Surgeon: Chon Casper MD;  Location: [facility] (4TH FLR);  Service: Endoscopy;  Laterality: N/A;  Patient with his second poor bowel pre and has poor prep today.  If patient not intersted or can't get colonoscopy tomorrow will need constipation bowel prep and will need to restart his Eliquis today.Thanks,Chon     per Dr Blunt-ok to hold Eliquis 2 days prior      COVID     CORONARY ANGIOPLASTY WITH STENT PLACEMENT  9/13/2006    KIDNEY TRANSPLANT  2005    PARATHYROIDECTOMY      TREATMENT OF CARDIAC ARRHYTHMIA N/A 3/28/2022    Procedure: CARDIOVERSION;  Surgeon: Migue Blunt MD;  Location: [facility];  Service: Cardiology;  Laterality: N/A;  AF, DCC, MAC, GP, 3 PREP*RODRIGO deferred pt 100% compliant*     Family History   Problem Relation Age of Onset    No Known Problems Sister     No Known Problems Brother     Thyroid disease Mother         s/p surgery    Heart disease Father         had pacemaker    No Known Problems Sister     Kidney failure Sister     Kidney disease Sister     No Known Problems Sister     Kidney disease Brother     Kidney failure Brother         s/p transplant    Diabetes Mellitus Neg Hx     Stroke Neg Hx     Heart attack Neg Hx     Cancer Neg Hx     Celiac disease Neg Hx     Cirrhosis " Neg Hx     Colon cancer Neg Hx     Esophageal cancer Neg Hx     Inflammatory bowel disease Neg Hx     Rectal cancer Neg Hx     Stomach cancer Neg Hx     Ulcerative colitis Neg Hx     Liver disease Neg Hx     Liver cancer Neg Hx     Crohn's disease Neg Hx     Melanoma Neg Hx      Social History     Tobacco Use    Smoking status: Former Smoker     Packs/day: 0.50     Years: 40.00     Pack years: 20.00     Types: Cigarettes     Quit date: 2022     Years since quittin.4    Smokeless tobacco: Never Used    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   Substance Use Topics    Alcohol use: No    Drug use: No     Review of Systems   Constitutional: Negative for chills and fever.   HENT: Positive for congestion. Negative for sore throat.    Eyes: Negative for discharge and redness.   Respiratory: Positive for cough and shortness of breath.    Cardiovascular: Positive for leg swelling. Negative for chest pain.   Gastrointestinal: Negative for abdominal pain, nausea and vomiting.   Genitourinary: Negative for dysuria and flank pain.   Musculoskeletal: Negative for back pain and neck pain.   Skin: Negative for rash.   Neurological: Negative for weakness and headaches.   Psychiatric/Behavioral: Negative for behavioral problems and confusion.       Physical Exam     Initial Vitals [22 1750]   BP Pulse Resp Temp SpO2   (!) 178/81 72 (!) 24 98 °F (36.7 °C) 98 %      MAP       --         Physical Exam    Constitutional: He appears well-developed. No distress.   HENT:   Head: Normocephalic and atraumatic.   Mouth/Throat: Oropharynx is clear and moist.   Eyes: Conjunctivae and EOM are normal.   Neck: No JVD present.   Normal range of motion.  Cardiovascular: Normal rate, regular rhythm, normal heart sounds and intact distal pulses.   Pulmonary/Chest: Breath sounds normal. No respiratory distress.   Tachypnea   Abdominal: Abdomen is soft. He exhibits no  distension.   Musculoskeletal:         General: Edema (Trace bilateral lower extremity edema) present. No tenderness. Normal range of motion.      Cervical back: Normal range of motion.     Neurological: He is alert and oriented to person, place, and time. He has normal strength.   Skin: Skin is warm and dry. Capillary refill takes less than 2 seconds.         ED Course   Procedures  Labs Reviewed   CBC W/ AUTO DIFFERENTIAL - Abnormal; Notable for the following components:       Result Value    RBC 3.52 (*)     Hemoglobin 8.8 (*)     Hematocrit 28.7 (*)     MCH 25.0 (*)     MCHC 30.7 (*)     RDW 15.7 (*)     Lymph # 0.8 (*)     Gran % 80.6 (*)     Lymph % 9.6 (*)     All other components within normal limits   COMPREHENSIVE METABOLIC PANEL - Abnormal; Notable for the following components:    CO2 20 (*)     BUN 34 (*)     Creatinine 2.8 (*)     Calcium 7.9 (*)     eGFR if  25.8 (*)     eGFR if non  22.3 (*)     All other components within normal limits   B-TYPE NATRIURETIC PEPTIDE - Abnormal; Notable for the following components:     (*)     All other components within normal limits   TROPONIN I - Abnormal; Notable for the following components:    Troponin I 0.034 (*)     All other components within normal limits   ISTAT PROCEDURE - Abnormal; Notable for the following components:    POC PO2 36 (*)     POC HCO3 22.6 (*)     POC SATURATED O2 69 (*)     All other components within normal limits   SARS-COV-2 RNA AMPLIFICATION, QUAL     EKG Readings: (Independently Interpreted)   Initial Reading: No STEMI. Rhythm: Normal Sinus Rhythm. Heart Rate: 62. Ectopy: No Ectopy. Conduction: Normal. ST Segments: Normal ST Segments. T Waves: Normal. Axis: Normal. Clinical Impression: Normal Sinus Rhythm with PVCs       Imaging Results          X-Ray Chest 1 View (Final result)  Result time 07/30/22 19:22:41    Final result by Alton Carranza DO (07/30/22 19:22:41)                 Impression:       Mild perihilar and bibasilar interstitial opacities which may represent edema or an atypical infectious process.      Electronically signed by: Alton Carranza  Date:    07/30/2022  Time:    19:22             Narrative:    EXAMINATION:  XR CHEST 1 VIEW    CLINICAL HISTORY:  shortness of breath;    TECHNIQUE:  Single frontal view of the chest was performed.    COMPARISON:  Chest radiograph from 06/05/2022.    FINDINGS:  The lungs are hyperexpanded.    There is mild perihilar and bibasilar interstitial opacities which may represent edema or an atypical infectious process.  The pleural spaces are clear.  There is no large focal consolidation.    The cardiac silhouette is enlarged, unchanged.  There are atherosclerotic calcifications of the aortic arch.    The visualized osseous structures demonstrate degenerative changes.                                 Medications   NIFEdipine 24 hr tablet 60 mg (60 mg Oral Given 7/30/22 1947)   hydrALAZINE injection 10 mg (10 mg Intravenous Given 7/30/22 1844)   furosemide injection 80 mg (80 mg Intravenous Given 7/30/22 1947)     Medical Decision Making:   History:   Old Medical Records: I decided to obtain old medical records.  Old Records Summarized: records from clinic visits.       <> Summary of Records: Echo on 3/8/22  · Moderate left atrial enlargement.  · The left ventricle is normal in size with concentric remodeling and normal systolic function.  · The estimated ejection fraction is 55%.  · There are segmental left ventricular wall motion abnormalities : akinetic basal inferior wall.  · Indeterminate left ventricular diastolic function.  · Mild right atrial enlargement.  · Normal right ventricular size with normal right ventricular systolic function.  · There is mild aortic valve stenosis.  · Aortic valve area is 1.84 cm2; peak velocity is 2.60 m/s; mean gradient is 13 mmHg.  · Mild aortic regurgitation.  · Mild pulmonic regurgitation.  · The estimated PA systolic  pressure is 25 mmHg.  · Normal central venous pressure (3 mmHg).      Initial Assessment:   66 y.o. with history of CHF, CAD, kidney transplant, presents for shortness of breath. Patient is alert and oriented, afebrile, tachypnea but no hypoxia, trace bilateral lower extremities edema.  Point of care ultrasound, no diffuse B lines or pleural effusion.  Differential Diagnosis:   CHF exacerbation, fluid retention, pneumonia, ACS, hypertensive emergency.  Clinical Tests:   Lab Tests: Ordered and Reviewed  Radiological Study: Ordered and Reviewed  Medical Tests: Ordered and Reviewed             ED Course as of 07/30/22 2113   Sat Jul 30, 2022 1922 Troponin I(!): 0.034 [NC]   1923 BNP(!): 577 [NC]   1923 WBC: 8.33 [NC]   1923 Hemoglobin(!): 8.8 [NC]   1923 Platelets: 187 [NC]   1923 Sodium: 139 [NC]   1923 Potassium: 4.5 [NC]   1923 BUN(!): 34 [NC]   1923 Creatinine(!): 2.8 [NC]   2110 Update: patient is feeling improved after one dose of lasix. He urinated 500cc. The patient no longer has SOB. Labs including trop and cr are at the patients baseline. BNP is mildly elevated. Hgb is mildly low. Patient denies any BRBPR or dark tarry stools. We discussed admission for continued diuresis and BP management. The patient and his wife discussed and would prefer to go home and increase lasix dose at home. They will call his PCP Monday to see them in clinic for adjustments to medications. We discussed strict return precautions including worsening SOB, CP, evidence of GI bleeding, increasing BP or any other concerning symptoms. The patient and wife agree.     Gilda Gilbert MD  Emergency Medicine Staff   Critical Care Fellow  9:13 PM   [AE]      ED Course User Index  [AE] Gilda Gilbert MD  [NC] Abilio Shaffer MD          ATTENDING PHYSICIAN ATTESTATION   I have repeated the key portions of the resident's history and physical face to face with the patient, reviewed and agree with the resident medical documentation, and  supervised and managed the medical care of the patient.   I independently reviewed the triage note, vitals, and all ordered labs/rads.  Agree with resident note and plan of care. The resident and I discussed the case and disposition.      Gilda Gilbert MD  Emergency Medicine Staff   Critical Care Fellow  9:13 PM      Clinical Impression:   Final diagnoses:  [R06.02] Shortness of breath          ED Disposition Condition    Discharge Stable        ED Prescriptions     Medication Sig Dispense Start Date End Date Auth. Provider    furosemide (LASIX) 40 MG tablet Take 1 tablet (40 mg total) by mouth 2 (two) times daily. 10 tablet 7/30/2022  Gilda Gilbert MD        Follow-up Information     Follow up With Specialties Details Why Contact Info    Connie Magdaleno MD Internal Medicine Schedule an appointment as soon as possible for a visit in 2 days to discuss your visit today- your blood pressure [address]  [place] [address]  [phone]      [facility] - Emergency Dept Emergency Medicine  As needed, If symptoms worsen [address] [facility] [id number] [phone]           Gilda Gilbert MD  07/30/22 0105

## 2022-07-31 NOTE — DISCHARGE INSTRUCTIONS
"Please take 40mg of lasix twice daily for the next 5 days until you are at your "dry weight". Please call your primary care doctor on Monday to discuss this plan and to address your blood pressure. You may need to increase your doses.     If you develop any chest pain, worsening shortness of breath, or any other concerning symptoms please return to the ED.  "

## 2022-08-10 ENCOUNTER — OFFICE VISIT (OUTPATIENT)
Dept: INTERNAL MEDICINE | Facility: CLINIC | Age: 67
End: 2022-08-10
Payer: MEDICARE

## 2022-08-10 ENCOUNTER — LAB VISIT (OUTPATIENT)
Dept: LAB | Facility: HOSPITAL | Age: 67
End: 2022-08-10
Attending: INTERNAL MEDICINE
Payer: MEDICARE

## 2022-08-10 VITALS
WEIGHT: 238.13 LBS | HEIGHT: 73 IN | BODY MASS INDEX: 31.56 KG/M2 | DIASTOLIC BLOOD PRESSURE: 64 MMHG | SYSTOLIC BLOOD PRESSURE: 140 MMHG | OXYGEN SATURATION: 98 % | HEART RATE: 58 BPM

## 2022-08-10 DIAGNOSIS — Z94.0 H/O KIDNEY TRANSPLANT: ICD-10-CM

## 2022-08-10 DIAGNOSIS — R73.03 PREDIABETES: ICD-10-CM

## 2022-08-10 DIAGNOSIS — Z94.0 KIDNEY REPLACED BY TRANSPLANT: ICD-10-CM

## 2022-08-10 DIAGNOSIS — R06.09 DOE (DYSPNEA ON EXERTION): ICD-10-CM

## 2022-08-10 DIAGNOSIS — D84.821 IMMUNODEFICIENCY DUE TO TREATMENT WITH IMMUNOSUPPRESSIVE MEDICATION: ICD-10-CM

## 2022-08-10 DIAGNOSIS — Z79.01 LONG TERM (CURRENT) USE OF ANTICOAGULANTS: ICD-10-CM

## 2022-08-10 DIAGNOSIS — R41.3 MEMORY CHANGES: ICD-10-CM

## 2022-08-10 DIAGNOSIS — N25.81 SECONDARY RENAL HYPERPARATHYROIDISM: ICD-10-CM

## 2022-08-10 DIAGNOSIS — I13.0 HYPERTENSIVE HEART AND RENAL DISEASE WITH RENAL FAILURE, STAGE 1 THROUGH STAGE 4 OR UNSPECIFIED CHRONIC KIDNEY DISEASE, WITH HEART FAILURE: ICD-10-CM

## 2022-08-10 DIAGNOSIS — I50.32 CHRONIC DIASTOLIC HEART FAILURE: ICD-10-CM

## 2022-08-10 DIAGNOSIS — I50.32 CHRONIC DIASTOLIC HEART FAILURE: Primary | ICD-10-CM

## 2022-08-10 DIAGNOSIS — I25.10 CORONARY ARTERY DISEASE INVOLVING NATIVE CORONARY ARTERY OF NATIVE HEART WITHOUT ANGINA PECTORIS: ICD-10-CM

## 2022-08-10 DIAGNOSIS — E78.2 MIXED HYPERLIPIDEMIA: ICD-10-CM

## 2022-08-10 DIAGNOSIS — Z79.899 IMMUNODEFICIENCY DUE TO TREATMENT WITH IMMUNOSUPPRESSIVE MEDICATION: ICD-10-CM

## 2022-08-10 DIAGNOSIS — N18.4 ANEMIA DUE TO STAGE 4 CHRONIC KIDNEY DISEASE: ICD-10-CM

## 2022-08-10 DIAGNOSIS — D63.1 ANEMIA DUE TO STAGE 4 CHRONIC KIDNEY DISEASE: ICD-10-CM

## 2022-08-10 DIAGNOSIS — I48.0 PAROXYSMAL ATRIAL FIBRILLATION: ICD-10-CM

## 2022-08-10 PROBLEM — N17.9 AKI (ACUTE KIDNEY INJURY): Status: RESOLVED | Noted: 2017-01-25 | Resolved: 2022-08-10

## 2022-08-10 LAB
ANION GAP SERPL CALC-SCNC: 13 MMOL/L (ref 8–16)
BASOPHILS # BLD AUTO: 0.02 K/UL (ref 0–0.2)
BASOPHILS NFR BLD: 0.3 % (ref 0–1.9)
BNP SERPL-MCNC: 358 PG/ML (ref 0–99)
BUN SERPL-MCNC: 32 MG/DL (ref 8–23)
CALCIUM SERPL-MCNC: 8.4 MG/DL (ref 8.7–10.5)
CHLORIDE SERPL-SCNC: 105 MMOL/L (ref 95–110)
CO2 SERPL-SCNC: 23 MMOL/L (ref 23–29)
CREAT SERPL-MCNC: 2.8 MG/DL (ref 0.5–1.4)
DIFFERENTIAL METHOD: ABNORMAL
EOSINOPHIL # BLD AUTO: 0.1 K/UL (ref 0–0.5)
EOSINOPHIL NFR BLD: 1.1 % (ref 0–8)
ERYTHROCYTE [DISTWIDTH] IN BLOOD BY AUTOMATED COUNT: 15.2 % (ref 11.5–14.5)
EST. GFR  (NO RACE VARIABLE): 24 ML/MIN/1.73 M^2
GLUCOSE SERPL-MCNC: 107 MG/DL (ref 70–110)
HCT VFR BLD AUTO: 32.9 % (ref 40–54)
HGB BLD-MCNC: 10 G/DL (ref 14–18)
IMM GRANULOCYTES # BLD AUTO: 0.03 K/UL (ref 0–0.04)
IMM GRANULOCYTES NFR BLD AUTO: 0.4 % (ref 0–0.5)
LYMPHOCYTES # BLD AUTO: 0.9 K/UL (ref 1–4.8)
LYMPHOCYTES NFR BLD: 12.6 % (ref 18–48)
MCH RBC QN AUTO: 25.1 PG (ref 27–31)
MCHC RBC AUTO-ENTMCNC: 30.4 G/DL (ref 32–36)
MCV RBC AUTO: 83 FL (ref 82–98)
MONOCYTES # BLD AUTO: 0.5 K/UL (ref 0.3–1)
MONOCYTES NFR BLD: 7.1 % (ref 4–15)
NEUTROPHILS # BLD AUTO: 5.6 K/UL (ref 1.8–7.7)
NEUTROPHILS NFR BLD: 78.5 % (ref 38–73)
NRBC BLD-RTO: 0 /100 WBC
PLATELET # BLD AUTO: 218 K/UL (ref 150–450)
PMV BLD AUTO: 11.8 FL (ref 9.2–12.9)
POTASSIUM SERPL-SCNC: 4.6 MMOL/L (ref 3.5–5.1)
RBC # BLD AUTO: 3.98 M/UL (ref 4.6–6.2)
SODIUM SERPL-SCNC: 141 MMOL/L (ref 136–145)
TSH SERPL DL<=0.005 MIU/L-ACNC: 1.21 UIU/ML (ref 0.4–4)
VIT B12 SERPL-MCNC: 494 PG/ML (ref 210–950)
WBC # BLD AUTO: 7.08 K/UL (ref 3.9–12.7)

## 2022-08-10 PROCEDURE — 3288F FALL RISK ASSESSMENT DOCD: CPT | Mod: CPTII,S$GLB,, | Performed by: INTERNAL MEDICINE

## 2022-08-10 PROCEDURE — 3066F PR DOCUMENTATION OF TREATMENT FOR NEPHROPATHY: ICD-10-PCS | Mod: CPTII,S$GLB,, | Performed by: INTERNAL MEDICINE

## 2022-08-10 PROCEDURE — 3008F PR BODY MASS INDEX (BMI) DOCUMENTED: ICD-10-PCS | Mod: CPTII,S$GLB,, | Performed by: INTERNAL MEDICINE

## 2022-08-10 PROCEDURE — 3066F NEPHROPATHY DOC TX: CPT | Mod: CPTII,S$GLB,, | Performed by: INTERNAL MEDICINE

## 2022-08-10 PROCEDURE — 84443 ASSAY THYROID STIM HORMONE: CPT | Performed by: INTERNAL MEDICINE

## 2022-08-10 PROCEDURE — 1101F PT FALLS ASSESS-DOCD LE1/YR: CPT | Mod: CPTII,S$GLB,, | Performed by: INTERNAL MEDICINE

## 2022-08-10 PROCEDURE — 1159F MED LIST DOCD IN RCRD: CPT | Mod: CPTII,S$GLB,, | Performed by: INTERNAL MEDICINE

## 2022-08-10 PROCEDURE — 3077F PR MOST RECENT SYSTOLIC BLOOD PRESSURE >= 140 MM HG: ICD-10-PCS | Mod: CPTII,S$GLB,, | Performed by: INTERNAL MEDICINE

## 2022-08-10 PROCEDURE — 99999 PR PBB SHADOW E&M-EST. PATIENT-LVL V: ICD-10-PCS | Mod: PBBFAC,,, | Performed by: INTERNAL MEDICINE

## 2022-08-10 PROCEDURE — 1126F PR PAIN SEVERITY QUANTIFIED, NO PAIN PRESENT: ICD-10-PCS | Mod: CPTII,S$GLB,, | Performed by: INTERNAL MEDICINE

## 2022-08-10 PROCEDURE — 85025 COMPLETE CBC W/AUTO DIFF WBC: CPT | Performed by: INTERNAL MEDICINE

## 2022-08-10 PROCEDURE — 3078F DIAST BP <80 MM HG: CPT | Mod: CPTII,S$GLB,, | Performed by: INTERNAL MEDICINE

## 2022-08-10 PROCEDURE — 36415 COLL VENOUS BLD VENIPUNCTURE: CPT | Performed by: INTERNAL MEDICINE

## 2022-08-10 PROCEDURE — 82607 VITAMIN B-12: CPT | Performed by: INTERNAL MEDICINE

## 2022-08-10 PROCEDURE — 99214 PR OFFICE/OUTPT VISIT, EST, LEVL IV, 30-39 MIN: ICD-10-PCS | Mod: S$GLB,,, | Performed by: INTERNAL MEDICINE

## 2022-08-10 PROCEDURE — 3044F HG A1C LEVEL LT 7.0%: CPT | Mod: CPTII,S$GLB,, | Performed by: INTERNAL MEDICINE

## 2022-08-10 PROCEDURE — 83880 ASSAY OF NATRIURETIC PEPTIDE: CPT | Performed by: INTERNAL MEDICINE

## 2022-08-10 PROCEDURE — 1126F AMNT PAIN NOTED NONE PRSNT: CPT | Mod: CPTII,S$GLB,, | Performed by: INTERNAL MEDICINE

## 2022-08-10 PROCEDURE — 1159F PR MEDICATION LIST DOCUMENTED IN MEDICAL RECORD: ICD-10-PCS | Mod: CPTII,S$GLB,, | Performed by: INTERNAL MEDICINE

## 2022-08-10 PROCEDURE — 99999 PR PBB SHADOW E&M-EST. PATIENT-LVL V: CPT | Mod: PBBFAC,,, | Performed by: INTERNAL MEDICINE

## 2022-08-10 PROCEDURE — 1101F PR PT FALLS ASSESS DOC 0-1 FALLS W/OUT INJ PAST YR: ICD-10-PCS | Mod: CPTII,S$GLB,, | Performed by: INTERNAL MEDICINE

## 2022-08-10 PROCEDURE — 3078F PR MOST RECENT DIASTOLIC BLOOD PRESSURE < 80 MM HG: ICD-10-PCS | Mod: CPTII,S$GLB,, | Performed by: INTERNAL MEDICINE

## 2022-08-10 PROCEDURE — 99214 OFFICE O/P EST MOD 30 MIN: CPT | Mod: S$GLB,,, | Performed by: INTERNAL MEDICINE

## 2022-08-10 PROCEDURE — 99499 UNLISTED E&M SERVICE: CPT | Mod: S$GLB,,, | Performed by: INTERNAL MEDICINE

## 2022-08-10 PROCEDURE — 3077F SYST BP >= 140 MM HG: CPT | Mod: CPTII,S$GLB,, | Performed by: INTERNAL MEDICINE

## 2022-08-10 PROCEDURE — 1160F RVW MEDS BY RX/DR IN RCRD: CPT | Mod: CPTII,S$GLB,, | Performed by: INTERNAL MEDICINE

## 2022-08-10 PROCEDURE — 3008F BODY MASS INDEX DOCD: CPT | Mod: CPTII,S$GLB,, | Performed by: INTERNAL MEDICINE

## 2022-08-10 PROCEDURE — 80048 BASIC METABOLIC PNL TOTAL CA: CPT | Performed by: INTERNAL MEDICINE

## 2022-08-10 PROCEDURE — 3288F PR FALLS RISK ASSESSMENT DOCUMENTED: ICD-10-PCS | Mod: CPTII,S$GLB,, | Performed by: INTERNAL MEDICINE

## 2022-08-10 PROCEDURE — 1160F PR REVIEW ALL MEDS BY PRESCRIBER/CLIN PHARMACIST DOCUMENTED: ICD-10-PCS | Mod: CPTII,S$GLB,, | Performed by: INTERNAL MEDICINE

## 2022-08-10 PROCEDURE — 99499 RISK ADDL DX/OHS AUDIT: ICD-10-PCS | Mod: S$GLB,,, | Performed by: INTERNAL MEDICINE

## 2022-08-10 PROCEDURE — 3044F PR MOST RECENT HEMOGLOBIN A1C LEVEL <7.0%: ICD-10-PCS | Mod: CPTII,S$GLB,, | Performed by: INTERNAL MEDICINE

## 2022-08-10 RX ORDER — METOPROLOL SUCCINATE 25 MG/1
25 TABLET, EXTENDED RELEASE ORAL DAILY
Qty: 90 TABLET | Refills: 3 | Status: SHIPPED | OUTPATIENT
Start: 2022-08-10 | End: 2023-08-19 | Stop reason: SDUPTHER

## 2022-08-10 NOTE — PROGRESS NOTES
INTERNAL MEDICINE ESTABLISHED PATIENT VISIT NOTE    Subjective:     Chief Complaint: Follow-up  HTN, HF     Patient ID: Ibrahima Phelps is a 66 y.o. male with HTN c CKD3 and hx renal transplant x2 in 2002 and 2005 and on immunosuppressive meds and secondary hyperPTH, HLD, A fib, hx SVT, CAD c chronic HF c preserved EF, COPD c baseline DAUGHERTY and tob use, thrombocytopenia now resolved, anemia of chronic disease, preDM, GERD, lung nodule on CT 2/2020 c plan to repeat screening CT in one year, last seen by me in April, here today for f/u.    Was admitted at [facility] in early June p presenting c sx of LLQ abd pain.  Noted to have KATINA and treated c IVFs.      CT renal study showed stable pulm micronodule, coronary athersclerosis and prostatomegaly; otherwise unremarkable.      Abd pain resolved s intervention and pt discharged home.    Also had some dyspnea (which he has had at baseline) and CXR showed now acute findings.    Still followed by Cardiology clinic for A fib, still on Flecainide and Toprol XL.  At his recent visit c Dr. Blunt, since he had sx of progressive fatigue and DAUGHERTY, Flecainide was reduced to 50 bid and Toprol was also reduced to 12.5 daily c plan to f/u in 6 mos.  However, pt states he only decreased the Flecainide.  Still taking a full 25 mg tablet of Toprol as he states he didn't know he was supposed to back down on the dose.    Seen by Nephrology in late June who referred him to Pulm for sob.  Of note, PFTs done 2/2022 c normal spirometry but improved c bronchodilator.  CT chest in Jan c stable likely calcified granulomas.    Had ED visit last mo for foot pain/suspected gout.  Also had gout in his R elbow which was treated c Colchicine and steroids.  States that has since resolved.    Had a separate ED visit in late July for SOB/wheezing x 2 days.  CXR at that time showed mild perihilar and bibasilar interstitial opacities suggestive or edema or atypical infectious process.  BNP at that time elevated  at 577 and weight up 5 lbs from his previous baseline.  Was treated c one dose of Lasix and discharged home on Lasix bid for 5 days which he completed and is feeling overall better.  Not as SOB as previous but expresses concern regarding recent memory issues.    Says he has been more forgetful and the other night woke up in the middle of the night and felt disoriented and couldn't remember who his wife was, which scared him.  Had a neuro appt scheduled previously but states it was canceled by their office and pt was unsure why.    Presents today c his wife Shea.      Past Medical History:  Past Medical History:   Diagnosis Date    (HFpEF) heart failure with preserved ejection fraction 9/25/2017    Atrial fibrillation     CAD (coronary artery disease)     CHF (congestive heart failure)     Chronic diastolic heart failure 4/8/2020    CKD (chronic kidney disease) stage 3, GFR 30-59 ml/min     Dr. Latif    CKD (chronic kidney disease) stage 3, GFR 30-59 ml/min     Diverticulosis     Fever blister     Heart attack 2006    Hyperlipidemia     Hypertension     Immunodeficiency due to treatment with immunosuppressive medication     Keloid cicatrix     Metabolic bone disease     Pericarditis     S/P kidney transplant     Supraventricular tachycardia 06/2019    Thrombocytopenia     Thyroid disease     Tobacco use 9/5/2014    Unspecified disorder of kidney and ureter     Stage IIICKD       Home Medications:  Prior to Admission medications    Medication Sig Start Date End Date Taking? Authorizing Provider   albuterol (VENTOLIN HFA) 90 mcg/actuation inhaler Inhale 2 puffs into the lungs every 6 (six) hours as needed for Wheezing or Shortness of Breath. Rescue 4/18/22   Connie Magdaleno MD   ammonium lactate 12 % Crea Apply 1 g topically once daily. 2/1/22   Denia Kauffman DPM   apixaban (ELIQUIS) 5 mg Tab Take 1 tablet (5 mg total) by mouth 2 (two) times daily. 6/16/22   Shagufta Blunt MD   aspirin  (ECOTRIN) 81 MG EC tablet Take 1 tablet by mouth Daily. 6/11/12   Historical Provider   atorvastatin (LIPITOR) 20 MG tablet Take 1 tablet (20 mg total) by mouth once daily. 3/8/22   Shagufta Blunt MD   calcium carbonate (OS-TRISH) 500 mg calcium (1,250 mg) tablet TAKE 2 TABLETS BY MOUTH TWICE A DAY 2/24/22   Emre Latif MD   colchicine (COLCRYS) 0.6 mg tablet Take 2 tablets at once. Take 1 tablet one hour later. 7/14/22   Kemi Pryor MD   colchicine (MITIGARE) 0.6 mg Cap One po BID prn gout flare up to 3 days 6/27/22   Emre Latif MD   doxazosin (CARDURA) 4 MG tablet TAKE 1 TABLET (4 MG TOTAL) BY MOUTH EVERY 12 (TWELVE) HOURS. 4/8/22   Emre Latif MD   famotidine (PEPCID) 20 MG tablet Take 1 tablet (20 mg total) by mouth 2 (two) times daily. 2/24/22   Emre Latif MD   fexofenadine (ALLEGRA) 60 MG tablet Take 1 tablet by mouth Twice daily as needed. 6/11/12   Historical Provider   flecainide (TAMBOCOR) 50 MG Tab Take 1 tablet (50 mg total) by mouth every 12 (twelve) hours. 6/13/22 6/13/23  Migue Blunt MD   fluticasone propionate (FLONASE) 50 mcg/actuation nasal spray 1 spray (50 mcg total) by Each Nostril route once daily. 7/26/19   Trish Worthy PA-C   furosemide (LASIX) 40 MG tablet Take 1 tablet (40 mg total) by mouth 2 (two) times daily. 7/30/22   Gilda Gilbert MD   gabapentin (NEURONTIN) 300 MG capsule TAKE 1 CAPSULE TWICE DAILY 6/30/22   Alberta Loredo NP   hydrALAZINE (APRESOLINE) 50 MG tablet Take 1 tablet (50 mg total) by mouth 3 (three) times daily. 2/24/22   Emre Latif MD   metoprolol succinate (TOPROL-XL) 25 MG 24 hr tablet Take 0.5 tablets (12.5 mg total) by mouth once daily. 6/13/22 6/13/23  Migue Blunt MD   multivitamin (THERAGRAN) per tablet Take 1 tablet by mouth Daily. 6/11/12   Historical Provider   mycophenolate (CELLCEPT) 250 mg Cap Take 2 capsules (500 mg total) by mouth 2 (two) times daily. 7/21/22 7/16/23  Emre FIGUEROA  MD Bhavya   nicotine (NICODERM CQ) 21 mg/24 hr Place 1 patch onto the skin once daily. 22   Emre Latif MD   NIFEdipine (PROCARDIA-XL) 60 MG (OSM) 24 hr tablet Take 1 tablet (60 mg total) by mouth 2 (two) times a day. 22  Connie Magdaleno MD   paricalcitoL (ZEMPLAR) 1 MCG capsule TAKE 1 TABLET BY MOUTH ON MONDAY, WEDNESDAY, & 22   Emre Latif MD   PFIZER COVID-19 FREDO VACCN,PF, 30 mcg/0.3 mL injection  22   Historical Provider   potassium chloride (KLOR-CON) 10 MEQ TbSR TAKE 1 TABLET EVERY DAY 22   Alberta Loredo NP   predniSONE (DELTASONE) 5 MG tablet TAKE 1 TABLET BY MOUTH EVERY DAY IN THE MORNING 22   Emre Latif MD   predniSONE (DELTASONE) 50 MG Tab Take 1 tablet (50 mg total) by mouth once daily. 22   Kemi Pryor MD   spironolactone (ALDACTONE) 25 MG tablet Take 1 tablet (25 mg total) by mouth once daily. 22   Connie Magdaleno MD   tacrolimus (PROGRAF) 1 MG Cap Two po in am and two po in pm 22   Mandie Lomeli NP   vitamin D 1000 units Tab Take 370 mg by mouth 2 (two) times daily. 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   Substance Use Topics    Alcohol use: No    Drug use: No        Review of Systems   Constitutional: Negative for chills, fatigue and fever.   Respiratory: Negative for cough, chest tightness and shortness of breath.    Cardiovascular: Negative for chest  "pain.   Gastrointestinal: Negative for abdominal pain and blood in stool.   Genitourinary: Negative for dysuria and frequency.         Health Maintenance:     Immunizations:   Influenza 11/2021  TDap is up to date 3/2018  Pneumovax 12/2016, Prevnar 13 2/2017, pvax booster 2/2020  Shingrix 10/8/2020, 12/2020  COVID vaccine 2/11/21 Pfizer, 3/5/21, 8/2021, 4/11/22      Cancer Screening:  Colonoscopy: is up to date. 9/2014, polyps, hyperplastic and tubular adenoma, rec f/u in 5 yrs, most recent csc done 3/2021 c area of patchy erythema, neg for colitis or dysplasia, f/u 2 yrs per report (3/2023)  PSA 12/2020 wnl, can order repeat at f/u (previously ordered but not completed)  AAA u/s 12/2020 no AAA    Objective:   BP (!) 140/64   Pulse (!) 58   Ht 6' 1" (1.854 m)   Wt 108 kg (238 lb 1.6 oz)   SpO2 98%   BMI 31.41 kg/m²        General: AAO x3, no apparent distress  HEENT: PERRL, OP clear  CV: RRR c occasional irregular beats, no m/r/g  Pulm: Lungs CTAB, no crackles, no wheezes  Abd: s/NT/ND +BS  Extremities: no c/c/e    Labs:     Lab Results   Component Value Date    WBC 8.33 07/30/2022    HGB 8.8 (L) 07/30/2022    HCT 28.7 (L) 07/30/2022    MCV 82 07/30/2022     07/30/2022     Lab Results   Component Value Date    IRON 62 06/17/2020    TIBC 256 06/17/2020    FERRITIN 777 (H) 06/17/2020       Sodium   Date Value Ref Range Status   07/30/2022 139 136 - 145 mmol/L Final     Potassium   Date Value Ref Range Status   07/30/2022 4.5 3.5 - 5.1 mmol/L Final     Chloride   Date Value Ref Range Status   07/30/2022 108 95 - 110 mmol/L Final     CO2   Date Value Ref Range Status   07/30/2022 20 (L) 23 - 29 mmol/L Final     Glucose   Date Value Ref Range Status   07/30/2022 107 70 - 110 mg/dL Final     BUN   Date Value Ref Range Status   07/30/2022 34 (H) 8 - 23 mg/dL Final     Creatinine   Date Value Ref Range Status   07/30/2022 2.8 (H) 0.5 - 1.4 mg/dL Final     Calcium   Date Value Ref Range Status   07/30/2022 7.9 " (L) 8.7 - 10.5 mg/dL Final     Total Protein   Date Value Ref Range Status   07/30/2022 7.1 6.0 - 8.4 g/dL Final     Albumin   Date Value Ref Range Status   07/30/2022 3.5 3.5 - 5.2 g/dL Final     Total Bilirubin   Date Value Ref Range Status   07/30/2022 0.5 0.1 - 1.0 mg/dL Final     Comment:     For infants and newborns, interpretation of results should be based  on gestational age, weight and in agreement with clinical  observations.    Premature Infant recommended reference ranges:  Up to 24 hours.............<8.0 mg/dL  Up to 48 hours............<12.0 mg/dL  3-5 days..................<15.0 mg/dL  6-29 days.................<15.0 mg/dL       Alkaline Phosphatase   Date Value Ref Range Status   07/30/2022 65 55 - 135 U/L Final     AST   Date Value Ref Range Status   07/30/2022 21 10 - 40 U/L Final     ALT   Date Value Ref Range Status   07/30/2022 28 10 - 44 U/L Final     Anion Gap   Date Value Ref Range Status   07/30/2022 11 8 - 16 mmol/L Final     eGFR if    Date Value Ref Range Status   07/30/2022 25.8 (A) >60 mL/min/1.73 m^2 Final     eGFR if non    Date Value Ref Range Status   07/30/2022 22.3 (A) >60 mL/min/1.73 m^2 Final     Comment:     Calculation used to obtain the estimated glomerular filtration  rate (eGFR) is the CKD-EPI equation.        Lab Results   Component Value Date    HGBA1C 5.9 (H) 06/05/2022     Lab Results   Component Value Date    LDLCALC 70.2 03/08/2022     Lab Results   Component Value Date    TSH 1.260 09/16/2021         Assessment/Plan     Ibrahima was seen today for follow-up.    Diagnoses and all orders for this visit:    Chronic diastolic heart failure  Appears euvolemic on clinical exam today, weight still up 2-3 lbs from previous baseline but down since last ER visit.  Completed course of Furosemide from the ED.  Still on Aldactone (old med).  Will cont current regimen and repeat labs today.  Will not resume Furosemide unless recurrent volume  overload.      metoprolol succinate (TOPROL-XL) 25 MG 24 hr tablet; Take 1 tablet (25 mg total) by mouth once daily.  -     B-TYPE NATRIURETIC PEPTIDE; Future    DAUGHERTY (dyspnea on exertion)  Suspect multifactorial, most recently due to volume overload which has since been treated c improvement of sx.  If recurrent fluid, could consider low dose Lasix daily  Will monitor.  Also hx smoker but last PFTs c normal spirometry, referred to pulm by Dr. Latif    Paroxysmal atrial fibrillation  Long term (current) use of anticoagulants [V58.61]  No acute issues, will cont meds as per Cards, Flecainide decreased by Cards.  Pt was still taking a whole tablet of Metoprolol.  Based on today's bp readings, will cont a whole tablet daily.    Memory changes  As per HPI  Unsure if worse p recent dose of increased prednisone for gout.  Was supposed to be evaluated by Neuro but appt was canceled.  Will have checkout reschedule.  Add B12, TSH to labs.       TSH; Future  -     Vitamin B12; Future  -     Ambulatory referral/consult to Neurology; Future    Hypertensive heart and renal disease with renal failure, stage 1 through stage 4 or unspecified chronic kidney disease, with heart failure  Close to goal, consider increase in Hydralazine if elevation persists        Basic Metabolic Panel; Future    Coronary artery disease involving native coronary artery of native heart without angina pectoris  No issues, followed by Cardiology    H/O kidney transplant  Kidney replaced by transplant  Immunodeficiency due to treatment with immunosuppressive medication  Secondary renal hyperparathyroidism  Will repeat BMP since on recent diuretics.  Cont routine f/u    Anemia due to stage 4 chronic kidney diseaseRecent drop in h/h on last labs, will repeat now.  -     CBC Auto Differential; Future    Prediabetes  Lab Results   Component Value Date    HGBA1C 5.9 (H) 06/05/2022     Mild  Pt was counseled on the need to avoid intake of simple sugars and  minimize carbohydrates.     Mixed hyperlipidemia  Lab Results   Component Value Date    LDLCALC 70.2 03/08/2022     at goal.  Cont current medications.        HM as above  RTC in 3-4 mos, sooner if needed.    Connie Magdaleno MD  Department of Internal Medicine - [facility]  08/10/2022

## 2022-08-24 ENCOUNTER — PATIENT MESSAGE (OUTPATIENT)
Dept: PULMONOLOGY | Facility: CLINIC | Age: 67
End: 2022-08-24
Payer: MEDICARE

## 2022-08-25 ENCOUNTER — PATIENT OUTREACH (OUTPATIENT)
Dept: ADMINISTRATIVE | Facility: HOSPITAL | Age: 67
End: 2022-08-25
Payer: MEDICARE

## 2022-09-08 ENCOUNTER — LAB VISIT (OUTPATIENT)
Dept: LAB | Facility: HOSPITAL | Age: 67
End: 2022-09-08
Attending: INTERNAL MEDICINE
Payer: MEDICARE

## 2022-09-08 ENCOUNTER — PATIENT MESSAGE (OUTPATIENT)
Dept: NEPHROLOGY | Facility: CLINIC | Age: 67
End: 2022-09-08
Payer: MEDICARE

## 2022-09-08 DIAGNOSIS — Z94.0 KIDNEY REPLACED BY TRANSPLANT: ICD-10-CM

## 2022-09-08 DIAGNOSIS — Z94.0 KIDNEY REPLACED BY TRANSPLANT: Primary | ICD-10-CM

## 2022-09-08 DIAGNOSIS — Z79.899 IMMUNOSUPPRESSIVE MANAGEMENT ENCOUNTER FOLLOWING KIDNEY TRANSPLANT: ICD-10-CM

## 2022-09-08 DIAGNOSIS — N18.4 CKD (CHRONIC KIDNEY DISEASE) STAGE 4, GFR 15-29 ML/MIN: ICD-10-CM

## 2022-09-08 DIAGNOSIS — E55.9 VITAMIN D DEFICIENCY: ICD-10-CM

## 2022-09-08 DIAGNOSIS — Z94.0 IMMUNOSUPPRESSIVE MANAGEMENT ENCOUNTER FOLLOWING KIDNEY TRANSPLANT: ICD-10-CM

## 2022-09-08 LAB
25(OH)D3+25(OH)D2 SERPL-MCNC: 75 NG/ML (ref 30–96)
ALBUMIN SERPL BCP-MCNC: 3.5 G/DL (ref 3.5–5.2)
ALBUMIN SERPL BCP-MCNC: 3.5 G/DL (ref 3.5–5.2)
ALP SERPL-CCNC: 70 U/L (ref 55–135)
ALT SERPL W/O P-5'-P-CCNC: 19 U/L (ref 10–44)
ANION GAP SERPL CALC-SCNC: 11 MMOL/L (ref 8–16)
AST SERPL-CCNC: 20 U/L (ref 10–40)
BASOPHILS # BLD AUTO: 0.04 K/UL (ref 0–0.2)
BASOPHILS NFR BLD: 0.5 % (ref 0–1.9)
BILIRUB DIRECT SERPL-MCNC: 0.2 MG/DL (ref 0.1–0.3)
BILIRUB SERPL-MCNC: 0.4 MG/DL (ref 0.1–1)
BUN SERPL-MCNC: 27 MG/DL (ref 8–23)
CALCIUM SERPL-MCNC: 8.4 MG/DL (ref 8.7–10.5)
CHLORIDE SERPL-SCNC: 106 MMOL/L (ref 95–110)
CO2 SERPL-SCNC: 24 MMOL/L (ref 23–29)
CREAT SERPL-MCNC: 2.5 MG/DL (ref 0.5–1.4)
DIFFERENTIAL METHOD: ABNORMAL
EOSINOPHIL # BLD AUTO: 0.3 K/UL (ref 0–0.5)
EOSINOPHIL NFR BLD: 3.3 % (ref 0–8)
ERYTHROCYTE [DISTWIDTH] IN BLOOD BY AUTOMATED COUNT: 14.8 % (ref 11.5–14.5)
EST. GFR  (NO RACE VARIABLE): 27.5 ML/MIN/1.73 M^2
ESTIMATED AVG GLUCOSE: 108 MG/DL (ref 68–131)
GLUCOSE SERPL-MCNC: 95 MG/DL (ref 70–110)
HBA1C MFR BLD: 5.4 % (ref 4–5.6)
HCT VFR BLD AUTO: 34.6 % (ref 40–54)
HGB BLD-MCNC: 10.4 G/DL (ref 14–18)
IMM GRANULOCYTES # BLD AUTO: 0.04 K/UL (ref 0–0.04)
IMM GRANULOCYTES NFR BLD AUTO: 0.5 % (ref 0–0.5)
LYMPHOCYTES # BLD AUTO: 1.5 K/UL (ref 1–4.8)
LYMPHOCYTES NFR BLD: 19.1 % (ref 18–48)
MAGNESIUM SERPL-MCNC: 2 MG/DL (ref 1.6–2.6)
MCH RBC QN AUTO: 24.6 PG (ref 27–31)
MCHC RBC AUTO-ENTMCNC: 30.1 G/DL (ref 32–36)
MCV RBC AUTO: 82 FL (ref 82–98)
MONOCYTES # BLD AUTO: 0.9 K/UL (ref 0.3–1)
MONOCYTES NFR BLD: 11.5 % (ref 4–15)
NEUTROPHILS # BLD AUTO: 5 K/UL (ref 1.8–7.7)
NEUTROPHILS NFR BLD: 65.1 % (ref 38–73)
NRBC BLD-RTO: 0 /100 WBC
PHOSPHATE SERPL-MCNC: 4.2 MG/DL (ref 2.7–4.5)
PLATELET # BLD AUTO: 230 K/UL (ref 150–450)
PMV BLD AUTO: 12.9 FL (ref 9.2–12.9)
POTASSIUM SERPL-SCNC: 4 MMOL/L (ref 3.5–5.1)
PROT SERPL-MCNC: 7 G/DL (ref 6–8.4)
PTH-INTACT SERPL-MCNC: 71.8 PG/ML (ref 9–77)
RBC # BLD AUTO: 4.22 M/UL (ref 4.6–6.2)
SODIUM SERPL-SCNC: 141 MMOL/L (ref 136–145)
TACROLIMUS BLD-MCNC: 2.8 NG/ML (ref 5–15)
WBC # BLD AUTO: 7.63 K/UL (ref 3.9–12.7)

## 2022-09-08 PROCEDURE — 36415 COLL VENOUS BLD VENIPUNCTURE: CPT | Performed by: INTERNAL MEDICINE

## 2022-09-08 PROCEDURE — 82306 VITAMIN D 25 HYDROXY: CPT | Performed by: INTERNAL MEDICINE

## 2022-09-08 PROCEDURE — 80069 RENAL FUNCTION PANEL: CPT | Performed by: INTERNAL MEDICINE

## 2022-09-08 PROCEDURE — 84075 ASSAY ALKALINE PHOSPHATASE: CPT | Performed by: INTERNAL MEDICINE

## 2022-09-08 PROCEDURE — 83735 ASSAY OF MAGNESIUM: CPT | Performed by: INTERNAL MEDICINE

## 2022-09-08 PROCEDURE — 85025 COMPLETE CBC W/AUTO DIFF WBC: CPT | Performed by: INTERNAL MEDICINE

## 2022-09-08 PROCEDURE — 80197 ASSAY OF TACROLIMUS: CPT | Performed by: INTERNAL MEDICINE

## 2022-09-08 PROCEDURE — 83036 HEMOGLOBIN GLYCOSYLATED A1C: CPT | Performed by: INTERNAL MEDICINE

## 2022-09-08 PROCEDURE — 83970 ASSAY OF PARATHORMONE: CPT | Performed by: INTERNAL MEDICINE

## 2022-09-09 ENCOUNTER — PATIENT MESSAGE (OUTPATIENT)
Dept: NEPHROLOGY | Facility: CLINIC | Age: 67
End: 2022-09-09
Payer: MEDICARE

## 2022-09-12 ENCOUNTER — PATIENT MESSAGE (OUTPATIENT)
Dept: NEPHROLOGY | Facility: CLINIC | Age: 67
End: 2022-09-12
Payer: MEDICARE

## 2022-09-12 ENCOUNTER — LAB VISIT (OUTPATIENT)
Dept: LAB | Facility: HOSPITAL | Age: 67
End: 2022-09-12
Attending: INTERNAL MEDICINE
Payer: MEDICARE

## 2022-09-12 DIAGNOSIS — Z94.0 KIDNEY REPLACED BY TRANSPLANT: ICD-10-CM

## 2022-09-12 DIAGNOSIS — Z94.0 KIDNEY REPLACED BY TRANSPLANT: Primary | ICD-10-CM

## 2022-09-12 LAB — TACROLIMUS BLD-MCNC: 3.8 NG/ML (ref 5–15)

## 2022-09-12 PROCEDURE — 80197 ASSAY OF TACROLIMUS: CPT | Performed by: INTERNAL MEDICINE

## 2022-09-12 PROCEDURE — 36415 COLL VENOUS BLD VENIPUNCTURE: CPT | Performed by: INTERNAL MEDICINE

## 2022-09-13 ENCOUNTER — PATIENT MESSAGE (OUTPATIENT)
Dept: NEPHROLOGY | Facility: CLINIC | Age: 67
End: 2022-09-13
Payer: MEDICARE

## 2022-09-13 RX ORDER — TACROLIMUS 0.5 MG/1
CAPSULE ORAL
Qty: 30 CAPSULE | Refills: 11 | Status: SHIPPED | OUTPATIENT
Start: 2022-09-13 | End: 2022-10-07

## 2022-09-15 ENCOUNTER — OFFICE VISIT (OUTPATIENT)
Dept: NEPHROLOGY | Facility: CLINIC | Age: 67
End: 2022-09-15
Payer: MEDICARE

## 2022-09-15 VITALS
WEIGHT: 244 LBS | HEART RATE: 66 BPM | SYSTOLIC BLOOD PRESSURE: 122 MMHG | BODY MASS INDEX: 32.34 KG/M2 | DIASTOLIC BLOOD PRESSURE: 60 MMHG | HEIGHT: 73 IN | OXYGEN SATURATION: 98 %

## 2022-09-15 DIAGNOSIS — E78.2 MIXED HYPERLIPIDEMIA: ICD-10-CM

## 2022-09-15 DIAGNOSIS — Z94.0 KIDNEY REPLACED BY TRANSPLANT: ICD-10-CM

## 2022-09-15 DIAGNOSIS — Z94.0 H/O KIDNEY TRANSPLANT: ICD-10-CM

## 2022-09-15 DIAGNOSIS — D63.1 ANEMIA DUE TO STAGE 3B CHRONIC KIDNEY DISEASE: ICD-10-CM

## 2022-09-15 DIAGNOSIS — R73.03 PREDIABETES: ICD-10-CM

## 2022-09-15 DIAGNOSIS — I47.10 SUPRAVENTRICULAR TACHYCARDIA: ICD-10-CM

## 2022-09-15 DIAGNOSIS — I73.9 PAD (PERIPHERAL ARTERY DISEASE): ICD-10-CM

## 2022-09-15 DIAGNOSIS — I48.0 PAROXYSMAL ATRIAL FIBRILLATION: ICD-10-CM

## 2022-09-15 DIAGNOSIS — Z87.891 FORMER SMOKER: ICD-10-CM

## 2022-09-15 DIAGNOSIS — E66.9 OBESITY (BMI 30-39.9): ICD-10-CM

## 2022-09-15 DIAGNOSIS — N25.81 SECONDARY RENAL HYPERPARATHYROIDISM: ICD-10-CM

## 2022-09-15 DIAGNOSIS — I25.10 CORONARY ARTERY DISEASE INVOLVING NATIVE CORONARY ARTERY OF NATIVE HEART WITHOUT ANGINA PECTORIS: ICD-10-CM

## 2022-09-15 DIAGNOSIS — K57.90 DIVERTICULOSIS: ICD-10-CM

## 2022-09-15 DIAGNOSIS — D63.1 ANEMIA DUE TO STAGE 4 CHRONIC KIDNEY DISEASE: ICD-10-CM

## 2022-09-15 DIAGNOSIS — D84.821 IMMUNODEFICIENCY DUE TO TREATMENT WITH IMMUNOSUPPRESSIVE MEDICATION: ICD-10-CM

## 2022-09-15 DIAGNOSIS — R06.02 SOB (SHORTNESS OF BREATH): ICD-10-CM

## 2022-09-15 DIAGNOSIS — Z94.0 IMMUNOSUPPRESSIVE MANAGEMENT ENCOUNTER FOLLOWING KIDNEY TRANSPLANT: ICD-10-CM

## 2022-09-15 DIAGNOSIS — N18.4 CKD (CHRONIC KIDNEY DISEASE) STAGE 4, GFR 15-29 ML/MIN: Primary | ICD-10-CM

## 2022-09-15 DIAGNOSIS — N18.4 ANEMIA DUE TO STAGE 4 CHRONIC KIDNEY DISEASE: ICD-10-CM

## 2022-09-15 DIAGNOSIS — I50.32 CHRONIC DIASTOLIC HEART FAILURE: ICD-10-CM

## 2022-09-15 DIAGNOSIS — M89.8X9 METABOLIC BONE DISEASE: ICD-10-CM

## 2022-09-15 DIAGNOSIS — Z79.899 IMMUNODEFICIENCY DUE TO TREATMENT WITH IMMUNOSUPPRESSIVE MEDICATION: ICD-10-CM

## 2022-09-15 DIAGNOSIS — I13.0 HYPERTENSIVE HEART AND RENAL DISEASE WITH RENAL FAILURE, STAGE 1 THROUGH STAGE 4 OR UNSPECIFIED CHRONIC KIDNEY DISEASE, WITH HEART FAILURE: ICD-10-CM

## 2022-09-15 DIAGNOSIS — Z79.899 IMMUNOSUPPRESSIVE MANAGEMENT ENCOUNTER FOLLOWING KIDNEY TRANSPLANT: ICD-10-CM

## 2022-09-15 DIAGNOSIS — R91.8 MULTIPLE LUNG NODULES ON CT: ICD-10-CM

## 2022-09-15 DIAGNOSIS — I50.30 HEART FAILURE WITH PRESERVED EJECTION FRACTION, UNSPECIFIED HF CHRONICITY: ICD-10-CM

## 2022-09-15 DIAGNOSIS — N18.32 ANEMIA DUE TO STAGE 3B CHRONIC KIDNEY DISEASE: ICD-10-CM

## 2022-09-15 DIAGNOSIS — R73.9 HYPERGLYCEMIA: ICD-10-CM

## 2022-09-15 DIAGNOSIS — D50.8 OTHER IRON DEFICIENCY ANEMIA: ICD-10-CM

## 2022-09-15 DIAGNOSIS — E55.9 VITAMIN D DEFICIENCY: ICD-10-CM

## 2022-09-15 PROCEDURE — 3044F PR MOST RECENT HEMOGLOBIN A1C LEVEL <7.0%: ICD-10-PCS | Mod: CPTII,S$GLB,, | Performed by: INTERNAL MEDICINE

## 2022-09-15 PROCEDURE — 3074F SYST BP LT 130 MM HG: CPT | Mod: CPTII,S$GLB,, | Performed by: INTERNAL MEDICINE

## 2022-09-15 PROCEDURE — 1159F MED LIST DOCD IN RCRD: CPT | Mod: CPTII,S$GLB,, | Performed by: INTERNAL MEDICINE

## 2022-09-15 PROCEDURE — 3288F PR FALLS RISK ASSESSMENT DOCUMENTED: ICD-10-PCS | Mod: CPTII,S$GLB,, | Performed by: INTERNAL MEDICINE

## 2022-09-15 PROCEDURE — 1125F AMNT PAIN NOTED PAIN PRSNT: CPT | Mod: CPTII,S$GLB,, | Performed by: INTERNAL MEDICINE

## 2022-09-15 PROCEDURE — 99215 PR OFFICE/OUTPT VISIT, EST, LEVL V, 40-54 MIN: ICD-10-PCS | Mod: S$GLB,,, | Performed by: INTERNAL MEDICINE

## 2022-09-15 PROCEDURE — 99499 RISK ADDL DX/OHS AUDIT: ICD-10-PCS | Mod: S$GLB,,, | Performed by: INTERNAL MEDICINE

## 2022-09-15 PROCEDURE — 3066F NEPHROPATHY DOC TX: CPT | Mod: CPTII,S$GLB,, | Performed by: INTERNAL MEDICINE

## 2022-09-15 PROCEDURE — 3066F PR DOCUMENTATION OF TREATMENT FOR NEPHROPATHY: ICD-10-PCS | Mod: CPTII,S$GLB,, | Performed by: INTERNAL MEDICINE

## 2022-09-15 PROCEDURE — 3008F PR BODY MASS INDEX (BMI) DOCUMENTED: ICD-10-PCS | Mod: CPTII,S$GLB,, | Performed by: INTERNAL MEDICINE

## 2022-09-15 PROCEDURE — 1125F PR PAIN SEVERITY QUANTIFIED, PAIN PRESENT: ICD-10-PCS | Mod: CPTII,S$GLB,, | Performed by: INTERNAL MEDICINE

## 2022-09-15 PROCEDURE — 3288F FALL RISK ASSESSMENT DOCD: CPT | Mod: CPTII,S$GLB,, | Performed by: INTERNAL MEDICINE

## 2022-09-15 PROCEDURE — 99999 PR PBB SHADOW E&M-EST. PATIENT-LVL IV: ICD-10-PCS | Mod: PBBFAC,,, | Performed by: INTERNAL MEDICINE

## 2022-09-15 PROCEDURE — 3078F DIAST BP <80 MM HG: CPT | Mod: CPTII,S$GLB,, | Performed by: INTERNAL MEDICINE

## 2022-09-15 PROCEDURE — 99499 UNLISTED E&M SERVICE: CPT | Mod: S$GLB,,, | Performed by: INTERNAL MEDICINE

## 2022-09-15 PROCEDURE — 99215 OFFICE O/P EST HI 40 MIN: CPT | Mod: S$GLB,,, | Performed by: INTERNAL MEDICINE

## 2022-09-15 PROCEDURE — 1159F PR MEDICATION LIST DOCUMENTED IN MEDICAL RECORD: ICD-10-PCS | Mod: CPTII,S$GLB,, | Performed by: INTERNAL MEDICINE

## 2022-09-15 PROCEDURE — 3074F PR MOST RECENT SYSTOLIC BLOOD PRESSURE < 130 MM HG: ICD-10-PCS | Mod: CPTII,S$GLB,, | Performed by: INTERNAL MEDICINE

## 2022-09-15 PROCEDURE — 99999 PR PBB SHADOW E&M-EST. PATIENT-LVL IV: CPT | Mod: PBBFAC,,, | Performed by: INTERNAL MEDICINE

## 2022-09-15 PROCEDURE — 3008F BODY MASS INDEX DOCD: CPT | Mod: CPTII,S$GLB,, | Performed by: INTERNAL MEDICINE

## 2022-09-15 PROCEDURE — 3078F PR MOST RECENT DIASTOLIC BLOOD PRESSURE < 80 MM HG: ICD-10-PCS | Mod: CPTII,S$GLB,, | Performed by: INTERNAL MEDICINE

## 2022-09-15 PROCEDURE — 1101F PR PT FALLS ASSESS DOC 0-1 FALLS W/OUT INJ PAST YR: ICD-10-PCS | Mod: CPTII,S$GLB,, | Performed by: INTERNAL MEDICINE

## 2022-09-15 PROCEDURE — 3044F HG A1C LEVEL LT 7.0%: CPT | Mod: CPTII,S$GLB,, | Performed by: INTERNAL MEDICINE

## 2022-09-15 PROCEDURE — 1101F PT FALLS ASSESS-DOCD LE1/YR: CPT | Mod: CPTII,S$GLB,, | Performed by: INTERNAL MEDICINE

## 2022-09-18 NOTE — PROGRESS NOTES
Subjective:       Patient ID: Ibrahima Phelps is a 66 y.o. Black or  male who presents for follow-up evaluation of Chronic Kidney Disease    HPI   He is getting over an URI, has not 'gone to his chest' but now feeling better. He continues to smoke a few cigarettes a day. Off PPI for several months now and without dyspepsia. No LE edema and no SOB.  He's noticed some allograft 'twinges' but no overt pain but no problems with urination. His wife is still working at [employer] as a manager of housing for [employer].     Interval history March 2019: he has three concerns:  1. Worsening tremors. Spilling corn and trouble with writing  2. Increased nocturia  3. Cold intolerance    He denies recent illness. No allogarft pain and no LUTS other than nocturia. He has unintentionally lost weight. No new medications. He is doing well off PPI. He is smoking 3 cigarettes to 3/4ppd depending on the day.     Interval history July 2019: he was hospitalized for CP and was found to be in SVT, his potassium was low in ER. He was CP free in ER after X 1 SL NTG and after SVT broke. Since then he feels well. He has lost a little bit of weight. Still smoking     Interval history Nov 2019: feels poorly--has URI and lost voice, no sick contacts. Kidney txp is managing Prograf--Cr is higher than baseline but Prograf is running high. No LUTS and no allograft pain.     Interval history April 2020:  The patient location is: Home  The chief complaint leading to consultation is: CKD and kidney transplant recioient  Visit type: audiovisual  Total time spent with patient:  28 minutes  Each patient to whom he or she provides medical services by telemedicine is:  (1) informed of the relationship between the physician and patient and the respective role of any other health care provider with respect to management of the patient; and (2) notified that he or she may decline to receive medical services by telemedicine and may withdraw from such  care at any time.  Notes: He is doing well. He stays home, his wife does the shopping and errands. He was admitted to the hospital in late March for chest pain, felt to be musculoskeletal. They started hydralazine for BP, lowered too much at home with symptoms and Cards advised to cut in half. No LE edema. No allograft pain and no LUTS. He still takes his calcium as instructed.   Interval lhistory Aug 2020:  He feels well. No hospital stays since last visit. He notes a skin lesion to right wrist, not pruritic, he noted similar spots when he was on coumadin but not as many when he was on Eliquis, it is actually improving. It  doesn't justice. He had one day of allograft tenderness but no LUTS associated with it and no recurrence. No new medications.     Interval history Jan 2021:   The patient location is: Home  The chief complaint leading to consultation is: CKD and kidney transplant   Visit type: audiovisual  Face to Face time with patient: 32 minutes  51 minutes of total time spent on the encounter, which includes face to face time and non-face to face time preparing to see the patient (eg, review of tests), Obtaining and/or reviewing separately obtained history, Documenting clinical information in the electronic or other health record, Independently interpreting results (not separately reported) and communicating results to the patient/family/caregiver, or Care coordination (not separately reported).   Each patient to whom he or she provides medical services by telemedicine is:  (1) informed of the relationship between the physician and patient and the respective role of any other health care provider with respect to management of the patient; and (2) notified that he or she may decline to receive medical services by telemedicine and may withdraw from such care at any time.  Notes: He notes BP is high. He feels well. He remains feeling cold most of the time. No new medications.     Interval history July 2021: He  is doing well. Brings a BP log with the average above goal. No CP     Interval History Jan 2022: He is feeling well. No allograft pain. No recent ER visits. Tremors about the same--saw Neuro     Interval History June 2022: He saw ENT and diagnosed with BPV, iproving. New baseline allograft function, has seen txp very closely. RLE edema. Has history of smoking, QUIT in Feb 2022     Sep 2022: He is feeling well. ER visit for gout of elbow and ER visit for SOB improved with Lasix IV dose.       Review of Systems   Constitutional:  Negative for activity change, appetite change, fatigue, fever and unexpected weight change.   HENT:  Negative for facial swelling.    Respiratory:  Negative for cough and shortness of breath.    Cardiovascular:  Negative for chest pain and leg swelling.   Gastrointestinal:  Negative for abdominal pain.   Endocrine: Negative for polyuria.   Genitourinary:  Positive for frequency. Negative for decreased urine volume, difficulty urinating, dysuria and hematuria.   Musculoskeletal:  Positive for arthralgias and back pain. Negative for joint swelling.   Allergic/Immunologic: Positive for environmental allergies and immunocompromised state.   Neurological:  Positive for tremors. Negative for dizziness and seizures.   Hematological:  Does not bruise/bleed easily.   Psychiatric/Behavioral:  Negative for confusion, decreased concentration and sleep disturbance. The patient is not nervous/anxious.      Objective:      Physical Exam  Vitals and nursing note reviewed.   Constitutional:       General: He is not in acute distress.     Appearance: Normal appearance. He is normal weight. He is not ill-appearing.   HENT:      Head: Atraumatic.      Nose: No congestion.   Eyes:      General: No scleral icterus.  Cardiovascular:      Rate and Rhythm: Normal rate.      Heart sounds:     No friction rub.   Pulmonary:      Effort: No respiratory distress.   Abdominal:      General: There is no distension.    Musculoskeletal:      Right lower leg: No edema.      Left lower leg: No edema.   Skin:     Coloration: Skin is not jaundiced.   Neurological:      General: No focal deficit present.      Mental Status: He is alert and oriented to person, place, and time. Mental status is at baseline.   Psychiatric:         Mood and Affect: Mood normal.         Behavior: Behavior normal.         Thought Content: Thought content normal.         Judgment: Judgment normal.       Assessment:       1. CKD (chronic kidney disease) stage 4, GFR 15-29 ml/min    2. Kidney replaced by transplant    3. Immunosuppressive management encounter following kidney transplant    4. Anemia due to stage 4 chronic kidney disease    5. Prediabetes    6. Secondary renal hyperparathyroidism    7. Former smoker    8. SOB (shortness of breath)    9. Vitamin D deficiency    10. Metabolic bone disease    11. Supraventricular tachycardia    12. PAD (peripheral artery disease)    13. Mixed hyperlipidemia    14. Chronic diastolic heart failure    15. Heart failure with preserved ejection fraction, unspecified HF chronicity    16. Paroxysmal atrial fibrillation    17. Coronary artery disease involving native coronary artery of native heart without angina pectoris    18. Anemia due to stage 3b chronic kidney disease    19. Other iron deficiency anemia    20. Obesity (BMI 30-39.9)    21. 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 Hyperglycemia    23. H/O kidney transplant    24. Diverticulosis    25. Multiple lung nodules on CT    26. Immunodeficiency due to treatment with immunosuppressive medication          Plan:             ESRD s/p kidney transplsant with resultant CKD. Stable allograft function with new baseline creatinine 2.5-2.9mg/dL. Repeat tac level pending, added 0.5mg for pm dose    CKD with subnephrotic  proteinuria--stable    HTN--controlled    Hyperglycemia post transplant, steroid induced/PreDiabetes--stable/improved    Mineral and Bone Disease--continue current treatment with calcium supplements, Zemplar and D3      RTC 4 months with labs prior    59 minutes of total time spent on the encounter, which includes face to face time and non-face to face time preparing to see the patient (eg, review of tests), Obtaining and/or reviewing separately obtained history, Documenting clinical information in the electronic or other health record, Independently interpreting results (not separately reported) and communicating results to the patient/family/caregiver, or Care coordination (not separately reported).

## 2022-09-20 ENCOUNTER — LAB VISIT (OUTPATIENT)
Dept: PRIMARY CARE CLINIC | Facility: CLINIC | Age: 67
End: 2022-09-20
Attending: INTERNAL MEDICINE
Payer: MEDICARE

## 2022-09-20 ENCOUNTER — PATIENT MESSAGE (OUTPATIENT)
Dept: NEPHROLOGY | Facility: CLINIC | Age: 67
End: 2022-09-20
Payer: MEDICARE

## 2022-09-20 DIAGNOSIS — Z94.0 KIDNEY REPLACED BY TRANSPLANT: ICD-10-CM

## 2022-09-20 DIAGNOSIS — Z94.0 KIDNEY REPLACED BY TRANSPLANT: Primary | ICD-10-CM

## 2022-09-20 LAB — TACROLIMUS BLD-MCNC: 3.8 NG/ML (ref 5–15)

## 2022-09-20 PROCEDURE — 80197 ASSAY OF TACROLIMUS: CPT | Performed by: INTERNAL MEDICINE

## 2022-09-29 DIAGNOSIS — J43.8 OTHER EMPHYSEMA: ICD-10-CM

## 2022-09-29 DIAGNOSIS — R91.8 MULTIPLE LUNG NODULES ON CT: Primary | ICD-10-CM

## 2022-09-30 ENCOUNTER — HOSPITAL ENCOUNTER (OUTPATIENT)
Dept: PULMONOLOGY | Facility: CLINIC | Age: 67
Discharge: HOME | End: 2022-09-30
Payer: MEDICARE

## 2022-09-30 ENCOUNTER — OFFICE VISIT (OUTPATIENT)
Dept: PULMONOLOGY | Facility: CLINIC | Age: 67
End: 2022-09-30
Payer: MEDICARE

## 2022-09-30 VITALS
BODY MASS INDEX: 32.63 KG/M2 | WEIGHT: 240.94 LBS | OXYGEN SATURATION: 98 % | DIASTOLIC BLOOD PRESSURE: 70 MMHG | HEART RATE: 65 BPM | SYSTOLIC BLOOD PRESSURE: 130 MMHG | HEIGHT: 72 IN

## 2022-09-30 DIAGNOSIS — I70.0 AORTIC ATHEROSCLEROSIS: ICD-10-CM

## 2022-09-30 DIAGNOSIS — J43.8 OTHER EMPHYSEMA: Primary | ICD-10-CM

## 2022-09-30 DIAGNOSIS — R06.02 SOB (SHORTNESS OF BREATH): ICD-10-CM

## 2022-09-30 DIAGNOSIS — R06.83 SNORING: ICD-10-CM

## 2022-09-30 DIAGNOSIS — R91.8 MULTIPLE LUNG NODULES ON CT: ICD-10-CM

## 2022-09-30 PROCEDURE — 1126F PR PAIN SEVERITY QUANTIFIED, NO PAIN PRESENT: ICD-10-PCS | Mod: CPTII,S$GLB,, | Performed by: INTERNAL MEDICINE

## 2022-09-30 PROCEDURE — 99499 UNLISTED E&M SERVICE: CPT | Mod: S$GLB,,, | Performed by: INTERNAL MEDICINE

## 2022-09-30 PROCEDURE — 94727 GAS DIL/WSHOT DETER LNG VOL: CPT | Mod: S$GLB,,, | Performed by: INTERNAL MEDICINE

## 2022-09-30 PROCEDURE — 1101F PR PT FALLS ASSESS DOC 0-1 FALLS W/OUT INJ PAST YR: ICD-10-PCS | Mod: CPTII,S$GLB,, | Performed by: INTERNAL MEDICINE

## 2022-09-30 PROCEDURE — 3044F PR MOST RECENT HEMOGLOBIN A1C LEVEL <7.0%: ICD-10-PCS | Mod: CPTII,S$GLB,, | Performed by: INTERNAL MEDICINE

## 2022-09-30 PROCEDURE — 94729 PR C02/MEMBANE DIFFUSE CAPACITY: ICD-10-PCS | Mod: S$GLB,,, | Performed by: INTERNAL MEDICINE

## 2022-09-30 PROCEDURE — 99204 PR OFFICE/OUTPT VISIT, NEW, LEVL IV, 45-59 MIN: ICD-10-PCS | Mod: 25,S$GLB,, | Performed by: INTERNAL MEDICINE

## 2022-09-30 PROCEDURE — 99204 OFFICE O/P NEW MOD 45 MIN: CPT | Mod: 25,S$GLB,, | Performed by: INTERNAL MEDICINE

## 2022-09-30 PROCEDURE — 94010 BREATHING CAPACITY TEST: CPT | Mod: S$GLB,,, | Performed by: INTERNAL MEDICINE

## 2022-09-30 PROCEDURE — 1126F AMNT PAIN NOTED NONE PRSNT: CPT | Mod: CPTII,S$GLB,, | Performed by: INTERNAL MEDICINE

## 2022-09-30 PROCEDURE — 3288F PR FALLS RISK ASSESSMENT DOCUMENTED: ICD-10-PCS | Mod: CPTII,S$GLB,, | Performed by: INTERNAL MEDICINE

## 2022-09-30 PROCEDURE — 1101F PT FALLS ASSESS-DOCD LE1/YR: CPT | Mod: CPTII,S$GLB,, | Performed by: INTERNAL MEDICINE

## 2022-09-30 PROCEDURE — 3066F PR DOCUMENTATION OF TREATMENT FOR NEPHROPATHY: ICD-10-PCS | Mod: CPTII,S$GLB,, | Performed by: INTERNAL MEDICINE

## 2022-09-30 PROCEDURE — 99999 PR PBB SHADOW E&M-EST. PATIENT-LVL V: CPT | Mod: PBBFAC,,, | Performed by: INTERNAL MEDICINE

## 2022-09-30 PROCEDURE — 94010 BREATHING CAPACITY TEST: ICD-10-PCS | Mod: S$GLB,,, | Performed by: INTERNAL MEDICINE

## 2022-09-30 PROCEDURE — 99999 PR PBB SHADOW E&M-EST. PATIENT-LVL V: ICD-10-PCS | Mod: PBBFAC,,, | Performed by: INTERNAL MEDICINE

## 2022-09-30 PROCEDURE — 3078F PR MOST RECENT DIASTOLIC BLOOD PRESSURE < 80 MM HG: ICD-10-PCS | Mod: CPTII,S$GLB,, | Performed by: INTERNAL MEDICINE

## 2022-09-30 PROCEDURE — 3078F DIAST BP <80 MM HG: CPT | Mod: CPTII,S$GLB,, | Performed by: INTERNAL MEDICINE

## 2022-09-30 PROCEDURE — 3075F PR MOST RECENT SYSTOLIC BLOOD PRESS GE 130-139MM HG: ICD-10-PCS | Mod: CPTII,S$GLB,, | Performed by: INTERNAL MEDICINE

## 2022-09-30 PROCEDURE — 3288F FALL RISK ASSESSMENT DOCD: CPT | Mod: CPTII,S$GLB,, | Performed by: INTERNAL MEDICINE

## 2022-09-30 PROCEDURE — 94729 DIFFUSING CAPACITY: CPT | Mod: S$GLB,,, | Performed by: INTERNAL MEDICINE

## 2022-09-30 PROCEDURE — 3075F SYST BP GE 130 - 139MM HG: CPT | Mod: CPTII,S$GLB,, | Performed by: INTERNAL MEDICINE

## 2022-09-30 PROCEDURE — 3066F NEPHROPATHY DOC TX: CPT | Mod: CPTII,S$GLB,, | Performed by: INTERNAL MEDICINE

## 2022-09-30 PROCEDURE — 94727 PR PULM FUNCTION TEST BY GAS: ICD-10-PCS | Mod: S$GLB,,, | Performed by: INTERNAL MEDICINE

## 2022-09-30 PROCEDURE — 3044F HG A1C LEVEL LT 7.0%: CPT | Mod: CPTII,S$GLB,, | Performed by: INTERNAL MEDICINE

## 2022-09-30 RX ORDER — FLUTICASONE PROPIONATE AND SALMETEROL XINAFOATE 115; 21 UG/1; UG/1
2 AEROSOL, METERED RESPIRATORY (INHALATION) EVERY 12 HOURS
Qty: 12 G | Refills: 5 | Status: ON HOLD | OUTPATIENT
Start: 2022-09-30 | End: 2023-11-26

## 2022-09-30 NOTE — PROGRESS NOTES
Subjective:       Patient ID: Ibrahima Phelps is a 66 y.o. male.    Chief Complaint: Shortness of Breath    HPI:   Ibrahima Phelps is a 66 y.o. male new to me w/ a h/o ESRD s/p kid tx who presents for evaluation of dyspnea.     Dyspnea- noticed it after the cardioversion. Notices it when he bends over and then stands back up. Does not feel limited with walking. Feels dyspneic with a flight of stairs. Improves w/ albuterol. Recurrent URI.     Reports chronic coryza, denies rhinorrhea. Uses afrin.     Denies chest pain, orthopnea, PND, LE edema, palpitations, syncope/presyncope    Denies GERD sx, dysphagia. Uses famotidine nightly    Denies f/c/ns, weight loss, malaise,    + snoring, daytime fatigue and witnessed apneas.     Denies rashes, myalgias, arthralgias, hair/nail changes,eye changes, raynauds, mucosal ulcerations    Exposures:  Work-   Tobacco- Smoked up to 1ppd for 40 years, quit 2/2022  Inhalational Agents- Denies  Mold- Denies  Pets- Denies  Birds- Denies    Childhood history of Lung Disease: Yes, denies hospitalizations, grew out of it.    Review of Systems   Constitutional:  Positive for fatigue. Negative for fever, chills, weight loss, activity change, appetite change, night sweats and weakness.   HENT:  Positive for congestion. Negative for postnasal drip, sinus pressure and voice change.    Eyes:  Negative for redness.   Respiratory:  Positive for shortness of breath and dyspnea on extertion. Negative for cough, sputum production, wheezing, previous hospitialization due to pulmonary problems, asthma nighttime symptoms and somnolence.    Cardiovascular:  Negative for chest pain, palpitations and leg swelling.   Musculoskeletal:  Negative for joint swelling and myalgias.   Skin:  Negative for rash.   Gastrointestinal:  Positive for acid reflux.   Neurological:  Negative for syncope and weakness.   Psychiatric/Behavioral:  Negative for sleep disturbance.        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     Patient is currently retired   Substance Use Topics    Alcohol use: No       Review of patient's allergies indicates:   Allergen Reactions    Ace inhibitors Swelling    Verapamil Other (See Comments)     Other reaction(s): Unknown    Povidone-iodine Itching     Past Medical History:   Diagnosis Date    (HFpEF) heart failure with preserved ejection fraction 2017    Atrial fibrillation     CAD (coronary artery disease)     CHF (congestive heart failure)     Chronic diastolic heart failure 2020    CKD (chronic kidney disease) stage 3, GFR 30-59 ml/min     Dr. Latif    CKD (chronic kidney disease) stage 3, GFR 30-59 ml/min     Diverticulosis     Fever blister     Heart attack     Hyperlipidemia     Hypertension     Immunodeficiency due to treatment with immunosuppressive medication     Keloid cicatrix     Metabolic bone disease     Pericarditis     S/P kidney transplant     Supraventricular tachycardia 2019    Thrombocytopenia     Thyroid disease     Tobacco use 2014    Unspecified disorder of kidney and ureter     Stage IIICKD     Past Surgical History:   Procedure Laterality Date    CARDIOVERSION  04/30/15    CHOLECYSTECTOMY      COLONOSCOPY  2011    Diverticulosis, repeat recommended in 3 yrs., repeat colonoscopy  revealed 2 polyps.  He should return in 5 years.    COLONOSCOPY N/A 3/13/2020    Procedure: COLONOSCOPY;  Surgeon: Chon Casper MD;  Location: [facility] ([address] [place] [address]);  Service: Endoscopy;  Laterality: N/A;  ok to hold coumadin x5days-see telephone encounter 20-tb    COLONOSCOPY N/A 2021    Procedure: COLONOSCOPY;  Surgeon: Chon Casper MD;  Location: [facility] ([facility]  "FLR);  Service: Endoscopy;  Laterality: N/A;  Eliquis - per Dr. GAVIN Blunt ok to hold x 2 days and "restarted asap"- ERW  last prep "poor", [id number] ordered/ Pt requests Dr. Casper  prep ins. mailed - COVID screening on 2/1/21 PCW- ERW    COLONOSCOPY N/A 3/3/2021    Procedure: COLONOSCOPY;  Surgeon: Chon Casper MD;  Location: [facility] (4TH FLR);  Service: Endoscopy;  Laterality: N/A;  Patient with his second poor bowel pre and has poor prep today.  If patient not intersted or can't get colonoscopy tomorrow will need constipation bowel prep and will need to restart his Eliquis today.Thanks,Chon Lopez to hold Eliquis 2 days prior      COVID     CORONARY ANGIOPLASTY WITH STENT PLACEMENT  9/13/2006    KIDNEY TRANSPLANT  2005    PARATHYROIDECTOMY      TREATMENT OF CARDIAC ARRHYTHMIA N/A 3/28/2022    Procedure: CARDIOVERSION;  Surgeon: Migue Blunt MD;  Location: [facility] EP [facility];  Service: Cardiology;  Laterality: N/A;  AF, DCC, MAC, GP, 3 PREP*RODRIGO deferred pt 100% compliant*     Current Outpatient Medications on File Prior to Visit   Medication Sig    albuterol (VENTOLIN HFA) 90 mcg/actuation inhaler Inhale 2 puffs into the lungs every 6 (six) hours as needed for Wheezing or Shortness of Breath. Rescue    ammonium lactate 12 % Crea Apply 1 g topically once daily.    aspirin (ECOTRIN) 81 MG EC tablet Take 1 tablet by mouth Daily.    atorvastatin (LIPITOR) 20 MG tablet Take 1 tablet (20 mg total) by mouth once daily.    colchicine (MITIGARE) 0.6 mg Cap One po BID prn gout flare up to 3 days    doxazosin (CARDURA) 4 MG tablet TAKE 1 TABLET (4 MG TOTAL) BY MOUTH EVERY 12 (TWELVE) HOURS.    famotidine (PEPCID) 20 MG tablet Take 1 tablet (20 mg total) by mouth 2 (two) times daily.    fexofenadine (ALLEGRA) 60 MG tablet Take 1 tablet by mouth Twice daily as needed.    flecainide (TAMBOCOR) 50 MG Tab Take 1 tablet (50 mg total) by mouth every 12 (twelve) hours.    fluticasone propionate (FLONASE) 50 " mcg/actuation nasal spray 1 spray (50 mcg total) by Each Nostril route once daily.    furosemide (LASIX) 40 MG tablet Take 1 tablet (40 mg total) by mouth 2 (two) times daily.    hydrALAZINE (APRESOLINE) 50 MG tablet Take 1 tablet (50 mg total) by mouth 3 (three) times daily.    metoprolol succinate (TOPROL-XL) 25 MG 24 hr tablet Take 1 tablet (25 mg total) by mouth once daily.    multivitamin (THERAGRAN) per tablet Take 1 tablet by mouth Daily.    mycophenolate (CELLCEPT) 250 mg Cap Take 2 capsules (500 mg total) by mouth 2 (two) times daily.    NIFEdipine (PROCARDIA-XL) 60 MG (OSM) 24 hr tablet Take 1 tablet (60 mg total) by mouth 2 (two) times a day.    paricalcitoL (ZEMPLAR) 1 MCG capsule TAKE 1 TABLET BY MOUTH ON MONDAY, WEDNESDAY, & FRIDAY    PFIZER COVID-19 FREDO VACCN,PF, 30 mcg/0.3 mL injection     predniSONE (DELTASONE) 5 MG tablet TAKE 1 TABLET BY MOUTH EVERY DAY IN THE MORNING    vitamin D 1000 units Tab Take 370 mg by mouth 2 (two) times daily. 2 tablets BID     No current facility-administered medications on file prior to visit.       Objective:      Vitals:    09/30/22 1334   BP: 130/70   BP Location: Right arm   Patient Position: Sitting   Pulse: 65   SpO2: 98%   Weight: 109.3 kg (240 lb 15.4 oz)   Height: 6' (1.829 m)     Physical Exam   Constitutional: He is oriented to person, place, and time. He appears well-developed and well-nourished.   HENT:   Nose: No mucosal edema.   Mouth/Throat: Oropharynx is clear and moist. Mallampati Score: I.   Neck: No tracheal deviation present.   Cardiovascular: Normal rate, regular rhythm and intact distal pulses.   Pulmonary/Chest: Normal expansion, symmetric chest wall expansion, effort normal and breath sounds normal. No respiratory distress. He has no wheezes. He has no rhonchi. He has no rales.   Abdominal: He exhibits no distension.   Musculoskeletal:         General: No edema.      Cervical back: Neck supple.   Lymphadenopathy: No supraclavicular adenopathy  is present.     He has no cervical adenopathy.   Neurological: He is alert and oriented to person, place, and time.   Skin: Skin is warm and dry. No cyanosis or erythema. Nails show no clubbing.   Psychiatric: He has a normal mood and affect.   Nursing note and vitals reviewed.    Personal Diagnostic Review    Laboratory:  9/8/22   Eosinophils- 0.3  Bicarb- 24    CT Chest 2/15/22- Images personally reviewed and compared to priors. I agree w/ the radiologist who notes;  Lungs: There are no abnormal opacities that require further evaluation.  The largest opacity in the right lung appears solid and measures 0.3 cm on series 4, image 154.  There are no new left-sided opacities.  The lungs show mild upper lobe predominant centrilobular emphysema.  Multiple bilateral calcified nodules, likely calcified granulomas.     Pleura:   No effusion..     Heart and pericardium: LAD stent.  Moderate bilateral calcification of the coronary arteries. Normal size without effusion.     Aorta and vasculature: Aortic valve calcification.  Severe calcific disease of the coronary arteries and mild calcific disease of the aorta noted..     Chest wall and skeletal structures: Surgical material in the thyroid bed.  Mild bilateral gynecomastia. Calcified hilar lymph nodes. Unremarkable except age-appropriate degenerative changes.    CXR 7/30/22- Images personally reviewed. I agree w/ the radiologist who notes  Mild perihilar and bibasilar interstitial opacities which may represent edema or an atypical infectious process.     PFTs 9/30/22- pending    Nuclear Stress Test 4/28/22    Normal myocardial perfusion scan. There is no evidence of myocardial ischemia or infarction.    The visually estimated ejection fraction is normal at rest and normal during stress.    There is normal wall motion at rest and post stress.    LV cavity size is normal at rest and normal at stress.    The EKG portion of this study is abnormal but not diagnostic.    The  patient reported no chest pain during the stress test.    There were no arrhythmias during stress.    When compared to the previous study from 6/29/2020, there are no significant changes.    TTE 3/21/22  Moderate left atrial enlargement.  The left ventricle is normal in size with concentric remodeling and normal systolic function.  The estimated ejection fraction is 55%.  There are segmental left ventricular wall motion abnormalities : akinetic basal inferior wall.  Indeterminate left ventricular diastolic function.  Mild right atrial enlargement.  Normal right ventricular size with normal right ventricular systolic function.  There is mild aortic valve stenosis.  Aortic valve area is 1.84 cm2; peak velocity is 2.60 m/s; mean gradient is 13 mmHg.  Mild aortic regurgitation.  Mild pulmonic regurgitation.  The estimated PA systolic pressure is 25 mmHg.  Normal central venous pressure (3 mmHg).  No flowsheet data found.        Assessment:     Problem List Items Addressed This Visit          Pulmonary    Other emphysema - Primary     COPD could be contributing to dyspnea. Trial of ICS/LABA, ANKITA         Relevant Medications    fluticasone propion-salmeterol 115-21 mcg/dose (ADVAIR HFA) 115-21 mcg/actuation HFAA inhaler    Multiple lung nodules on CT     Stable. Cont yearly CTs to screen for lung ca given his high risk status. Next is 2/2023            Cardiac/Vascular    Aortic atherosclerosis     Noted on CT Chest. On a statin. Mnmt per PCP            Other    Snoring     +snoring w/ daytime fatigue and BMI 32. Referral to sleep placed         Relevant Orders    Ambulatory referral/consult to Sleep Disorders       45 minutes of total time spent on the encounter, which includes face to face time and non-face to face time preparing to see the patient (eg, review of tests), Obtaining and/or reviewing separately obtained history, Documenting clinical information in the electronic or other health record, Independently  interpreting results (not separately reported) and communicating results to the patient/family/caregiver, or Care coordination (not separately reported).

## 2022-10-19 ENCOUNTER — PATIENT MESSAGE (OUTPATIENT)
Dept: NEPHROLOGY | Facility: CLINIC | Age: 67
End: 2022-10-19
Payer: MEDICARE

## 2022-10-23 PROBLEM — I70.0 AORTIC ATHEROSCLEROSIS: Status: ACTIVE | Noted: 2022-10-23

## 2022-10-23 PROBLEM — R06.83 SNORING: Status: ACTIVE | Noted: 2022-10-23

## 2022-10-31 ENCOUNTER — OFFICE VISIT (OUTPATIENT)
Dept: NEUROLOGY | Facility: CLINIC | Age: 67
End: 2022-10-31
Payer: MEDICARE

## 2022-10-31 VITALS
HEART RATE: 88 BPM | SYSTOLIC BLOOD PRESSURE: 170 MMHG | BODY MASS INDEX: 30.94 KG/M2 | DIASTOLIC BLOOD PRESSURE: 77 MMHG | TEMPERATURE: 97 F | WEIGHT: 233.44 LBS | HEIGHT: 73 IN | OXYGEN SATURATION: 99 %

## 2022-10-31 DIAGNOSIS — G25.2 POSTURAL TREMOR: ICD-10-CM

## 2022-10-31 DIAGNOSIS — R41.3 MEMORY CHANGES: ICD-10-CM

## 2022-10-31 DIAGNOSIS — R25.1 TREMOR OF RIGHT HAND: Primary | ICD-10-CM

## 2022-10-31 PROCEDURE — 99215 PR OFFICE/OUTPT VISIT, EST, LEVL V, 40-54 MIN: ICD-10-PCS | Mod: S$GLB,,, | Performed by: NEUROLOGICAL SURGERY

## 2022-10-31 PROCEDURE — 1159F PR MEDICATION LIST DOCUMENTED IN MEDICAL RECORD: ICD-10-PCS | Mod: CPTII,S$GLB,, | Performed by: NEUROLOGICAL SURGERY

## 2022-10-31 PROCEDURE — 3066F PR DOCUMENTATION OF TREATMENT FOR NEPHROPATHY: ICD-10-PCS | Mod: CPTII,S$GLB,, | Performed by: NEUROLOGICAL SURGERY

## 2022-10-31 PROCEDURE — 99999 PR PBB SHADOW E&M-EST. PATIENT-LVL V: ICD-10-PCS | Mod: PBBFAC,,, | Performed by: NEUROLOGICAL SURGERY

## 2022-10-31 PROCEDURE — 3288F PR FALLS RISK ASSESSMENT DOCUMENTED: ICD-10-PCS | Mod: CPTII,S$GLB,, | Performed by: NEUROLOGICAL SURGERY

## 2022-10-31 PROCEDURE — 3066F NEPHROPATHY DOC TX: CPT | Mod: CPTII,S$GLB,, | Performed by: NEUROLOGICAL SURGERY

## 2022-10-31 PROCEDURE — 1101F PR PT FALLS ASSESS DOC 0-1 FALLS W/OUT INJ PAST YR: ICD-10-PCS | Mod: CPTII,S$GLB,, | Performed by: NEUROLOGICAL SURGERY

## 2022-10-31 PROCEDURE — 3044F PR MOST RECENT HEMOGLOBIN A1C LEVEL <7.0%: ICD-10-PCS | Mod: CPTII,S$GLB,, | Performed by: NEUROLOGICAL SURGERY

## 2022-10-31 PROCEDURE — 1159F MED LIST DOCD IN RCRD: CPT | Mod: CPTII,S$GLB,, | Performed by: NEUROLOGICAL SURGERY

## 2022-10-31 PROCEDURE — 1101F PT FALLS ASSESS-DOCD LE1/YR: CPT | Mod: CPTII,S$GLB,, | Performed by: NEUROLOGICAL SURGERY

## 2022-10-31 PROCEDURE — 3077F PR MOST RECENT SYSTOLIC BLOOD PRESSURE >= 140 MM HG: ICD-10-PCS | Mod: CPTII,S$GLB,, | Performed by: NEUROLOGICAL SURGERY

## 2022-10-31 PROCEDURE — 3078F DIAST BP <80 MM HG: CPT | Mod: CPTII,S$GLB,, | Performed by: NEUROLOGICAL SURGERY

## 2022-10-31 PROCEDURE — 1126F AMNT PAIN NOTED NONE PRSNT: CPT | Mod: CPTII,S$GLB,, | Performed by: NEUROLOGICAL SURGERY

## 2022-10-31 PROCEDURE — 3077F SYST BP >= 140 MM HG: CPT | Mod: CPTII,S$GLB,, | Performed by: NEUROLOGICAL SURGERY

## 2022-10-31 PROCEDURE — 1126F PR PAIN SEVERITY QUANTIFIED, NO PAIN PRESENT: ICD-10-PCS | Mod: CPTII,S$GLB,, | Performed by: NEUROLOGICAL SURGERY

## 2022-10-31 PROCEDURE — 3078F PR MOST RECENT DIASTOLIC BLOOD PRESSURE < 80 MM HG: ICD-10-PCS | Mod: CPTII,S$GLB,, | Performed by: NEUROLOGICAL SURGERY

## 2022-10-31 PROCEDURE — 99215 OFFICE O/P EST HI 40 MIN: CPT | Mod: S$GLB,,, | Performed by: NEUROLOGICAL SURGERY

## 2022-10-31 PROCEDURE — 3044F HG A1C LEVEL LT 7.0%: CPT | Mod: CPTII,S$GLB,, | Performed by: NEUROLOGICAL SURGERY

## 2022-10-31 PROCEDURE — 3288F FALL RISK ASSESSMENT DOCD: CPT | Mod: CPTII,S$GLB,, | Performed by: NEUROLOGICAL SURGERY

## 2022-10-31 PROCEDURE — 99999 PR PBB SHADOW E&M-EST. PATIENT-LVL V: CPT | Mod: PBBFAC,,, | Performed by: NEUROLOGICAL SURGERY

## 2022-10-31 RX ORDER — CLONAZEPAM 0.5 MG/1
0.5 TABLET ORAL 2 TIMES DAILY PRN
Qty: 60 TABLET | Refills: 0 | Status: ON HOLD | OUTPATIENT
Start: 2022-10-31 | End: 2023-03-06 | Stop reason: HOSPADM

## 2022-10-31 NOTE — PROGRESS NOTES
Chief Complaint   Patient presents with    Tremors    Memory Loss     Ibrahima Phelps is a 66 y.o. male with a history of multiple medical diagnoses as listed below that presents for evaluation management of tremor and memory loss.  He is accompanied by his wife who helps provide some history.  He has had a longstanding history of tremor that has been present mainly in his right arm.  He says that this has been increasingly bothersome.  Recently after having something to drink he was trying to set his cup on to a table.  The tremors became so intense that she was unable to control his head arm causing all of the ice from the cup to fall out of the glass and onto the floor.  He says that he would like to discuss medication to keep this under control.  His wife also comments that he has been having some short-term memory loss mainly at identified as word-finding difficulties.  The level of functioning around the home seems to be relatively well preserved.  His wife is always care for most of the cooking, housekeeping, and finances.  He does maintain the lawn and does most of the driving as his wife is currently without a car.  He has not had any difficulty with any of his activities of daily living.     PAST MEDICAL HISTORY:  Past Medical History:   Diagnosis Date    (HFpEF) heart failure with preserved ejection fraction 9/25/2017    Atrial fibrillation     CAD (coronary artery disease)     CHF (congestive heart failure)     Chronic diastolic heart failure 4/8/2020    CKD (chronic kidney disease) stage 3, GFR 30-59 ml/min     Dr. Latif    CKD (chronic kidney disease) stage 3, GFR 30-59 ml/min     Diverticulosis     Fever blister     Heart attack 2006    Hyperlipidemia     Hypertension     Immunodeficiency due to treatment with immunosuppressive medication     Keloid cicatrix     Metabolic bone disease     Pericarditis     S/P kidney transplant     Supraventricular tachycardia 06/2019    Thrombocytopenia     Thyroid  "disease     Tobacco use 9/5/2014    Unspecified disorder of kidney and ureter     Stage IIICKD       PAST SURGICAL HISTORY:  Past Surgical History:   Procedure Laterality Date    CARDIOVERSION  04/30/15    CHOLECYSTECTOMY      COLONOSCOPY  April 20, 2011    Diverticulosis, repeat recommended in 3 yrs., repeat colonoscopy 2014 revealed 2 polyps.  He should return in 5 years.    COLONOSCOPY N/A 3/13/2020    Procedure: COLONOSCOPY;  Surgeon: Chon Casper MD;  Location: [facility] (4TH FLR);  Service: Endoscopy;  Laterality: N/A;  ok to hold coumadin x5days-see telephone encounter 2/4/20-tb    COLONOSCOPY N/A 2/4/2021    Procedure: COLONOSCOPY;  Surgeon: Chon Casper MD;  Location: [facility] (4TH FLR);  Service: Endoscopy;  Laterality: N/A;  Eliquis - per Dr. GAVIN Blunt ok to hold x 2 days and "restarted asap"- ERW  last prep "poor", [id number] ordered/ Pt requests Dr. Casper  prep ins. mailed - COVID screening on 2/1/21 PCW- ERW    COLONOSCOPY N/A 3/3/2021    Procedure: COLONOSCOPY;  Surgeon: Chon Casper MD;  Location: [facility] (4TH FLR);  Service: Endoscopy;  Laterality: N/A;  Patient with his second poor bowel pre and has poor prep today.  If patient not intersted or can't get colonoscopy tomorrow will need constipation bowel prep and will need to restart his Eliquis today.Thanks,Chon Blunt-ok to hold Eliquis 2 days prior      COVID     CORONARY ANGIOPLASTY WITH STENT PLACEMENT  9/13/2006    KIDNEY TRANSPLANT  2005    PARATHYROIDECTOMY      TREATMENT OF CARDIAC ARRHYTHMIA N/A 3/28/2022    Procedure: CARDIOVERSION;  Surgeon: Migue Blunt MD;  Location: [facility] EP LAB;  Service: Cardiology;  Laterality: N/A;  AF, DCC, MAC, GP, 3 PREP*RODRIGO deferred pt 100% compliant*       SOCIAL HISTORY:  Social History     Socioeconomic History    Marital status:    Occupational History    Occupation:    Tobacco Use    Smoking status: Former     Packs/day: 0.50     Years: 40.00     Pack years: " 20.00     Types: Cigarettes     Quit date: 2022     Years since quittin.6    Smokeless tobacco: Never    Tobacco comments:     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     Patient is currently retired   Substance and Sexual Activity    Alcohol use: No    Drug use: No    Sexual activity: Yes     Partners: Female   Social History Narrative    Retired        FAMILY HISTORY:  Family History   Problem Relation Age of Onset    No Known Problems Sister     No Known Problems Brother     Thyroid disease Mother         s/p surgery    Heart disease Father         had pacemaker    No Known Problems Sister     Kidney failure Sister     Kidney disease Sister     No Known Problems Sister     Kidney disease Brother     Kidney failure Brother         s/p transplant    Diabetes Mellitus Neg Hx     Stroke Neg Hx     Heart attack Neg Hx     Cancer Neg Hx     Celiac disease Neg Hx     Cirrhosis Neg Hx     Colon cancer Neg Hx     Esophageal cancer Neg Hx     Inflammatory bowel disease Neg Hx     Rectal cancer Neg Hx     Stomach cancer Neg Hx     Ulcerative colitis Neg Hx     Liver disease Neg Hx     Liver cancer Neg Hx     Crohn's disease Neg Hx     Melanoma Neg Hx        ALLERGIES AND MEDICATIONS: updated and reviewed.  Review of patient's allergies indicates:   Allergen Reactions    Ace inhibitors Swelling    Verapamil Other (See Comments)     Other reaction(s): Unknown    Povidone-iodine Itching     Current Outpatient Medications   Medication Sig Dispense Refill    ammonium lactate 12 % Crea Apply 1 g topically once daily. 140 g 1    aspirin (ECOTRIN) 81 MG EC tablet Take 1 tablet by mouth Daily.      atorvastatin (LIPITOR) 20 MG tablet Take 1 tablet (20 mg total) by mouth once daily. 90 tablet 3    colchicine (MITIGARE) 0.6 mg Cap One po BID prn gout flare up to 3 days 15 capsule 11    doxazosin (CARDURA) 4 MG tablet TAKE 1 TABLET (4 MG TOTAL) BY MOUTH EVERY 12 (TWELVE)  HOURS. 180 tablet 3    ELIQUIS 5 mg Tab TAKE 1 TABLET BY MOUTH TWICE A  tablet 3    famotidine (PEPCID) 20 MG tablet Take 1 tablet (20 mg total) by mouth 2 (two) times daily. 180 tablet 3    fluticasone propion-salmeterol 115-21 mcg/dose (ADVAIR HFA) 115-21 mcg/actuation HFAA inhaler Inhale 2 puffs into the lungs every 12 (twelve) hours. Controller 12 g 5    fluticasone propionate (FLONASE) 50 mcg/actuation nasal spray 1 spray (50 mcg total) by Each Nostril route once daily. 18.2 mL 0    furosemide (LASIX) 40 MG tablet Take 1 tablet (40 mg total) by mouth 2 (two) times daily. 10 tablet 0    gabapentin (NEURONTIN) 300 MG capsule TAKE 1 CAPSULE BY MOUTH TWICE A  capsule 2    hydrALAZINE (APRESOLINE) 50 MG tablet TAKE 1 TABLET (50 MG TOTAL) BY MOUTH 3 (THREE) TIMES DAILY. 270 tablet 3    KLOR-CON 10 10 mEq TbSR TAKE 1 TABLET BY MOUTH EVERY DAY 90 tablet 4    metoprolol succinate (TOPROL-XL) 25 MG 24 hr tablet Take 1 tablet (25 mg total) by mouth once daily. 90 tablet 3    multivitamin (THERAGRAN) per tablet Take 1 tablet by mouth Daily.      mycophenolate (CELLCEPT) 250 mg Cap Take 2 capsules (500 mg total) by mouth 2 (two) times daily. 360 capsule 3    NIFEdipine (PROCARDIA-XL) 60 MG (OSM) 24 hr tablet Take 1 tablet (60 mg total) by mouth 2 (two) times a day. 14 tablet 0    paricalcitoL (ZEMPLAR) 1 MCG capsule TAKE 1 TABLET BY MOUTH ON MONDAY, WEDNESDAY, & FRIDAY 36 capsule 3    predniSONE (DELTASONE) 5 MG tablet TAKE 1 TABLET BY MOUTH EVERY DAY IN THE MORNING 90 tablet 3    spironolactone (ALDACTONE) 25 MG tablet TAKE 1 TABLET BY MOUTH EVERY DAY 90 tablet 3    tacrolimus (PROGRAF) 1 MG Cap TAKE 2 CAPSULES BY MOUTH EVERY MORNING AND 1 CAPSULE EVERY EVENING. Z94.0 KIDNEY TRANSPLANT 270 capsule 3    vitamin D 1000 units Tab Take 370 mg by mouth 2 (two) times daily. 2 tablets BID      albuterol (VENTOLIN HFA) 90 mcg/actuation inhaler Inhale 2 puffs into the lungs every 6 (six) hours as needed for Wheezing  or Shortness of Breath. Rescue 18 g 3    atorvastatin (LIPITOR) 10 MG tablet TAKE 1 TABLET BY MOUTH EVERY DAY (Patient not taking: Reported on 10/31/2022) 90 tablet 1    calcium carbonate (OS-TRISH) 500 mg calcium (1,250 mg) tablet TAKE 2 TABLETS BY MOUTH TWICE A DAY (Patient not taking: Reported on 10/31/2022) 360 tablet 3    clonazePAM (KLONOPIN) 0.5 MG tablet Take 1 tablet (0.5 mg total) by mouth 2 (two) times daily as needed for Anxiety. 60 tablet 0    fexofenadine (ALLEGRA) 60 MG tablet Take 1 tablet by mouth Twice daily as needed.      flecainide (TAMBOCOR) 50 MG Tab Take 1 tablet (50 mg total) by mouth every 12 (twelve) hours. 180 tablet 3    PFIZER COVID-19 FREDO VACCN,PF, 30 mcg/0.3 mL injection       tacrolimus (PROGRAF) 0.5 MG Cap TAKE 1 CAPSULE BY MOUTH EVERY EVENING. (TAKE WITH 2 1MG CAPSULES) (Patient not taking: Reported on 10/31/2022) 90 capsule 3    tacrolimus (PROGRAF) 1 MG Cap Two po in am and two po in pm 270 capsule 3     No current facility-administered medications for this visit.       Review of Systems   Constitutional:  Negative for activity change, fatigue and unexpected weight change.   HENT:  Negative for trouble swallowing and voice change.    Eyes:  Negative for photophobia, pain and visual disturbance.   Respiratory:  Negative for apnea and shortness of breath.    Cardiovascular:  Negative for chest pain and palpitations.   Gastrointestinal:  Negative for constipation, nausea and vomiting.   Genitourinary:  Negative for difficulty urinating.   Musculoskeletal:  Negative for arthralgias, back pain, gait problem, myalgias and neck pain.   Skin:  Negative for color change and rash.   Neurological:  Positive for tremors. Negative for dizziness, seizures, syncope, speech difficulty, weakness, light-headedness, numbness and headaches.   Psychiatric/Behavioral:  Positive for decreased concentration and dysphoric mood. Negative for agitation, behavioral problems and confusion.      Neurologic  Exam     Mental Status   Oriented to person, place, and time.   Registration: recalls 3 of 3 objects.   Attention: normal. Concentration: normal.   Speech: speech is normal   Level of consciousness: alert  Knowledge: good.     Cranial Nerves     CN II   Right visual field deficit: none  Left visual field deficit: none     CN III, IV, VI   Pupils are equal, round, and reactive to light.  Extraocular motions are normal.   Right pupil: Size: 3 mm. Shape: regular.   Left pupil: Size: 3 mm. Shape: regular.   CN III: no CN III palsy  CN VI: no CN VI palsy  Nystagmus: none   Diplopia: none  Ophthalmoparesis: none  Upgaze: normal  Downgaze: normal  Conjugate gaze: present    CN VII   Facial expression full, symmetric.   Right facial weakness: none  Left facial weakness: none    CN VIII   CN VIII normal.     CN XI   CN XI normal.     CN XII   CN XII normal.   Tongue deviation: none    Motor Exam   Muscle bulk: normal  Overall muscle tone: normal  Right arm tone: normal  Left arm tone: normal  Right leg tone: normal  Left leg tone: normal    Gait, Coordination, and Reflexes     Gait  Gait: normal    Coordination   Finger to nose coordination: normal    Tremor   Resting tremor: absent  Intention tremor: present  Action tremor: right arm    Physical Exam  Constitutional:       Appearance: He is well-developed.   HENT:      Head: Normocephalic and atraumatic.   Eyes:      Extraocular Movements: EOM normal.      Pupils: Pupils are equal, round, and reactive to light.   Pulmonary:      Effort: Pulmonary effort is normal. No respiratory distress.   Musculoskeletal:         General: Normal range of motion.   Neurological:      Mental Status: He is alert and oriented to person, place, and time.      Coordination: Finger-Nose-Finger Test normal.      Gait: Gait is intact.   Psychiatric:         Speech: Speech normal.         Behavior: Behavior normal.       Vitals:    10/31/22 0826   BP: (!) 170/77   BP Location: Right arm   Patient  "Position: Sitting   BP Method: Large (Automatic)   Pulse: 88   Temp: 97 °F (36.1 °C)   SpO2: 99%   Weight: 105.9 kg (233 lb 7.5 oz)   Height: 6' 1" (1.854 m)       Assessment & Plan:    Problem List Items Addressed This Visit       Postural tremor    Overview     Worsening tremor over the last several months.  Patient would like to discuss medication options to keep his symptoms under control.  No evidence of parkinsonism causing his symptoms.         Relevant Medications    clonazePAM (KLONOPIN) 0.5 MG tablet    Memory changes    Overview     Memory changes possibly reflect changes from mild cognitive impairment however given the patient's multiple medical problems very likely this could relate to underlying depression.          Other Visit Diagnoses       Tremor of right hand    -  Primary    Relevant Medications    clonazePAM (KLONOPIN) 0.5 MG tablet          More than 50% of this 50 minute encounter was spent in counseling and coordinating care of tremor and memory loss.    Follow-up: No follow-ups on file.    This note was done with the assistance of voice recognition software. Some errors may be present after proofreading.          "

## 2022-11-03 ENCOUNTER — HOSPITAL ENCOUNTER (EMERGENCY)
Facility: HOSPITAL | Age: 67
Discharge: HOME | End: 2022-11-03
Attending: EMERGENCY MEDICINE
Payer: MEDICARE

## 2022-11-03 VITALS
BODY MASS INDEX: 31.81 KG/M2 | HEIGHT: 73 IN | SYSTOLIC BLOOD PRESSURE: 150 MMHG | DIASTOLIC BLOOD PRESSURE: 67 MMHG | WEIGHT: 240 LBS | OXYGEN SATURATION: 99 % | HEART RATE: 63 BPM | TEMPERATURE: 98 F | RESPIRATION RATE: 16 BRPM

## 2022-11-03 DIAGNOSIS — E87.20 METABOLIC ACIDOSIS: ICD-10-CM

## 2022-11-03 DIAGNOSIS — E83.51 HYPOCALCEMIA: ICD-10-CM

## 2022-11-03 DIAGNOSIS — R10.9 ABDOMINAL CRAMPING: Primary | ICD-10-CM

## 2022-11-03 LAB
ALBUMIN SERPL BCP-MCNC: 3 G/DL (ref 3.5–5.2)
ALP SERPL-CCNC: 75 U/L (ref 55–135)
ALT SERPL W/O P-5'-P-CCNC: 18 U/L (ref 10–44)
ANION GAP SERPL CALC-SCNC: 15 MMOL/L (ref 8–16)
AST SERPL-CCNC: 21 U/L (ref 10–40)
BACTERIA #/AREA URNS AUTO: NORMAL /HPF
BASOPHILS # BLD AUTO: 0.02 K/UL (ref 0–0.2)
BASOPHILS NFR BLD: 0.2 % (ref 0–1.9)
BILIRUB SERPL-MCNC: 0.2 MG/DL (ref 0.1–1)
BILIRUB UR QL STRIP: NEGATIVE
BUN SERPL-MCNC: 25 MG/DL (ref 8–23)
BUN SERPL-MCNC: 31 MG/DL (ref 6–30)
CALCIUM SERPL-MCNC: 6.5 MG/DL (ref 8.7–10.5)
CHLORIDE SERPL-SCNC: 109 MMOL/L (ref 95–110)
CHLORIDE SERPL-SCNC: 110 MMOL/L (ref 95–110)
CLARITY UR REFRACT.AUTO: CLEAR
CO2 SERPL-SCNC: 17 MMOL/L (ref 23–29)
COLOR UR AUTO: YELLOW
CREAT SERPL-MCNC: 2.8 MG/DL (ref 0.5–1.4)
CREAT SERPL-MCNC: 2.9 MG/DL (ref 0.5–1.4)
DIFFERENTIAL METHOD: ABNORMAL
EOSINOPHIL # BLD AUTO: 0.2 K/UL (ref 0–0.5)
EOSINOPHIL NFR BLD: 2.7 % (ref 0–8)
ERYTHROCYTE [DISTWIDTH] IN BLOOD BY AUTOMATED COUNT: 14.6 % (ref 11.5–14.5)
EST. GFR  (NO RACE VARIABLE): 24 ML/MIN/1.73 M^2
GLUCOSE SERPL-MCNC: 103 MG/DL (ref 70–110)
GLUCOSE SERPL-MCNC: 108 MG/DL (ref 70–110)
GLUCOSE UR QL STRIP: NEGATIVE
HCT VFR BLD AUTO: 35.6 % (ref 40–54)
HCT VFR BLD CALC: 34 %PCV (ref 36–54)
HGB BLD-MCNC: 11.1 G/DL (ref 14–18)
HGB UR QL STRIP: NEGATIVE
HIV 1+2 AB+HIV1 P24 AG SERPL QL IA: NORMAL
HYALINE CASTS UR QL AUTO: 1 /LPF
IMM GRANULOCYTES # BLD AUTO: 0.11 K/UL (ref 0–0.04)
IMM GRANULOCYTES NFR BLD AUTO: 1.3 % (ref 0–0.5)
KETONES UR QL STRIP: NEGATIVE
LEUKOCYTE ESTERASE UR QL STRIP: NEGATIVE
LIPASE SERPL-CCNC: 36 U/L (ref 4–60)
LYMPHOCYTES # BLD AUTO: 0.9 K/UL (ref 1–4.8)
LYMPHOCYTES NFR BLD: 11.5 % (ref 18–48)
MAGNESIUM SERPL-MCNC: 2.2 MG/DL (ref 1.6–2.6)
MCH RBC QN AUTO: 25.1 PG (ref 27–31)
MCHC RBC AUTO-ENTMCNC: 31.2 G/DL (ref 32–36)
MCV RBC AUTO: 80 FL (ref 82–98)
MICROSCOPIC COMMENT: NORMAL
MONOCYTES # BLD AUTO: 0.9 K/UL (ref 0.3–1)
MONOCYTES NFR BLD: 11.1 % (ref 4–15)
NEUTROPHILS # BLD AUTO: 6 K/UL (ref 1.8–7.7)
NEUTROPHILS NFR BLD: 73.2 % (ref 38–73)
NITRITE UR QL STRIP: NEGATIVE
NRBC BLD-RTO: 0 /100 WBC
PH UR STRIP: 5 [PH] (ref 5–8)
PLATELET # BLD AUTO: 249 K/UL (ref 150–450)
PMV BLD AUTO: 11 FL (ref 9.2–12.9)
POC IONIZED CALCIUM: 0.77 MMOL/L (ref 1.06–1.42)
POC TCO2 (MEASURED): 21 MMOL/L (ref 23–29)
POTASSIUM BLD-SCNC: 4.3 MMOL/L (ref 3.5–5.1)
POTASSIUM SERPL-SCNC: 3.8 MMOL/L (ref 3.5–5.1)
PROT SERPL-MCNC: 6.3 G/DL (ref 6–8.4)
PROT UR QL STRIP: ABNORMAL
RBC # BLD AUTO: 4.43 M/UL (ref 4.6–6.2)
RBC #/AREA URNS AUTO: 2 /HPF (ref 0–4)
SAMPLE: ABNORMAL
SODIUM BLD-SCNC: 141 MMOL/L (ref 136–145)
SODIUM SERPL-SCNC: 142 MMOL/L (ref 136–145)
SP GR UR STRIP: 1.01 (ref 1–1.03)
SQUAMOUS #/AREA URNS AUTO: 0 /HPF
TACROLIMUS BLD-MCNC: 5.8 NG/ML (ref 5–15)
URN SPEC COLLECT METH UR: ABNORMAL
WBC # BLD AUTO: 8.18 K/UL (ref 3.9–12.7)
WBC #/AREA URNS AUTO: 3 /HPF (ref 0–5)

## 2022-11-03 PROCEDURE — 99284 EMERGENCY DEPT VISIT MOD MDM: CPT | Mod: ,,, | Performed by: PHYSICIAN ASSISTANT

## 2022-11-03 PROCEDURE — 81001 URINALYSIS AUTO W/SCOPE: CPT | Performed by: PHYSICIAN ASSISTANT

## 2022-11-03 PROCEDURE — 85025 COMPLETE CBC W/AUTO DIFF WBC: CPT | Performed by: PHYSICIAN ASSISTANT

## 2022-11-03 PROCEDURE — 99284 PR EMERGENCY DEPT VISIT,LEVEL IV: ICD-10-PCS | Mod: ,,, | Performed by: PHYSICIAN ASSISTANT

## 2022-11-03 PROCEDURE — 83690 ASSAY OF LIPASE: CPT | Performed by: PHYSICIAN ASSISTANT

## 2022-11-03 PROCEDURE — 80053 COMPREHEN METABOLIC PANEL: CPT | Performed by: PHYSICIAN ASSISTANT

## 2022-11-03 PROCEDURE — 80197 ASSAY OF TACROLIMUS: CPT | Performed by: PHYSICIAN ASSISTANT

## 2022-11-03 PROCEDURE — 25000003 PHARM REV CODE 250: Performed by: PHYSICIAN ASSISTANT

## 2022-11-03 PROCEDURE — 83735 ASSAY OF MAGNESIUM: CPT | Performed by: PHYSICIAN ASSISTANT

## 2022-11-03 PROCEDURE — 96365 THER/PROPH/DIAG IV INF INIT: CPT

## 2022-11-03 PROCEDURE — 99284 EMERGENCY DEPT VISIT MOD MDM: CPT | Mod: 25

## 2022-11-03 PROCEDURE — 87389 HIV-1 AG W/HIV-1&-2 AB AG IA: CPT | Performed by: PHYSICIAN ASSISTANT

## 2022-11-03 RX ORDER — DICYCLOMINE HYDROCHLORIDE 10 MG/1
20 CAPSULE ORAL
Status: COMPLETED | OUTPATIENT
Start: 2022-11-03 | End: 2022-11-03

## 2022-11-03 RX ORDER — CALCIUM CHLORIDE INJECTION 100 MG/ML
1 INJECTION, SOLUTION INTRAVENOUS ONCE
Status: DISCONTINUED | OUTPATIENT
Start: 2022-11-03 | End: 2022-11-03

## 2022-11-03 RX ORDER — CALCIUM GLUCONATE 20 MG/ML
1 INJECTION, SOLUTION INTRAVENOUS
Status: COMPLETED | OUTPATIENT
Start: 2022-11-03 | End: 2022-11-03

## 2022-11-03 RX ORDER — DICYCLOMINE HYDROCHLORIDE 20 MG/1
20 TABLET ORAL 2 TIMES DAILY
Qty: 20 TABLET | Refills: 0 | Status: SHIPPED | OUTPATIENT
Start: 2022-11-03 | End: 2022-12-03

## 2022-11-03 RX ADMIN — CALCIUM GLUCONATE 1 G: 20 INJECTION, SOLUTION INTRAVENOUS at 03:11

## 2022-11-03 RX ADMIN — DICYCLOMINE HYDROCHLORIDE 20 MG: 10 CAPSULE ORAL at 01:11

## 2022-11-03 NOTE — ED PROVIDER NOTES
Encounter Date: 11/3/2022       History     Chief Complaint   Patient presents with    Abdominal Cramping    Diarrhea     Sx for 2 weeks, no antibiotic use     This is a 66 y.o. year old male with a PMH of HTN, CKD3 and hx renal transplant x 2 in 2002 and 2005 and on immunosuppressive meds and secondary hyperparathyroidism, HLD, A fib, hx SVT, CAD with chronic HF and preserved EF, COPD with baseline DAUGHERTY and tob use, thrombocytopenia now resolved, anemia of chronic disease, prediabetes, GERD who presents to the ED with a chief complaint of abdominal cramping. Patient reports a 14 day history of symptoms including diarrhea.  Diarrhea x 3 days a week ago described as black and loose.  The pain is described as intermittent cramping.  Attempted treatment includes pepto and kaopectate with improvement.  Pt reports similar episode after first kidney transplant.  No recent antibiotic usage. Pt denies fever, chills, nasal congestion, cough, chest pain, shortness of breath, nausea, vomiting, melena, blood in stool, dysuria, hematuria, flank pain, joint swelling or rashes. Patient is vaccinated against COVID and denies known exposure to a COVID-19 patient.  No recent travel.  Pt denies NSAID use.  Pt does not drink alcohol.  No sick contacts.      Review of patient's allergies indicates:   Allergen Reactions    Ace inhibitors Swelling    Verapamil Other (See Comments)     Other reaction(s): Unknown    Povidone-iodine Itching     Past Medical History:   Diagnosis Date    (HFpEF) heart failure with preserved ejection fraction 9/25/2017    Atrial fibrillation     CAD (coronary artery disease)     CHF (congestive heart failure)     Chronic diastolic heart failure 4/8/2020    CKD (chronic kidney disease) stage 3, GFR 30-59 ml/min     Dr. Latif    CKD (chronic kidney disease) stage 3, GFR 30-59 ml/min     Diverticulosis     Fever blister     Heart attack 2006    Hyperlipidemia     Hypertension     Immunodeficiency due to  "treatment with immunosuppressive medication     Keloid cicatrix     Metabolic bone disease     Pericarditis     S/P kidney transplant     Supraventricular tachycardia 06/2019    Thrombocytopenia     Thyroid disease     Tobacco use 9/5/2014    Unspecified disorder of kidney and ureter     Stage IIICKD     Past Surgical History:   Procedure Laterality Date    CARDIOVERSION  04/30/15    CHOLECYSTECTOMY      COLONOSCOPY  April 20, 2011    Diverticulosis, repeat recommended in 3 yrs., repeat colonoscopy 2014 revealed 2 polyps.  He should return in 5 years.    COLONOSCOPY N/A 3/13/2020    Procedure: COLONOSCOPY;  Surgeon: Chon Casper MD;  Location: [facility] (4TH FLR);  Service: Endoscopy;  Laterality: N/A;  ok to hold coumadin x5days-see telephone encounter 2/4/20-tb    COLONOSCOPY N/A 2/4/2021    Procedure: COLONOSCOPY;  Surgeon: Chon Casper MD;  [facility]  Service: Endoscopy;  Laterality: N/A;  Eliquis - per Dr. GAVIN Blunt ok to hold x 2 days and "restarted asap"- ERW  last prep "poor", [id number] ordered/ Pt requests Dr. Casper  prep ins. mailed - COVID screening on 2/1/21 PCW- ERW    COLONOSCOPY N/A 3/3/2021    Procedure: COLONOSCOPY;  Surgeon: Chon Capser MD;  [facility]  Service: Endoscopy;  Laterality: N/A;  Patient with his second poor bowel pre and has poor prep today.  If patient not intersted or can't get colonoscopy tomorrow will need constipation bowel prep and will need to restart his Eliquis today.Thanks,Chon     per Dr Blunt-ok to hold Eliquis 2 days prior      COVID     CORONARY ANGIOPLASTY WITH STENT PLACEMENT  9/13/2006    KIDNEY TRANSPLANT  2005    PARATHYROIDECTOMY      TREATMENT OF CARDIAC ARRHYTHMIA N/A 3/28/2022    Procedure: CARDIOVERSION;  Surgeon: Migue Blunt MD;  Location: [facility] EP LAB;  Service: Cardiology;  Laterality: N/A;  AF, DCC, MAC, GP, 3 PREP*RODRIGO deferred pt 100% compliant*     Family History   Problem Relation Age of Onset    " No Known Problems Sister     No Known Problems Brother     Thyroid disease Mother         s/p surgery    Heart disease Father         had pacemaker    No Known Problems Sister     Kidney failure Sister     Kidney disease Sister     No Known Problems Sister     Kidney disease Brother     Kidney failure Brother         s/p transplant    Diabetes Mellitus Neg Hx     Stroke Neg Hx     Heart attack Neg Hx     Cancer Neg Hx     Celiac disease Neg Hx     Cirrhosis Neg Hx     Colon cancer Neg Hx     Esophageal cancer Neg Hx     Inflammatory bowel disease Neg Hx     Rectal cancer Neg Hx     Stomach cancer Neg Hx     Ulcerative colitis Neg Hx     Liver disease Neg Hx     Liver cancer Neg Hx     Crohn's disease Neg Hx     Melanoma Neg Hx      Social History     Tobacco Use    Smoking status: Former     Packs/day: 0.50     Years: 40.00     Pack years: 20.00     Types: Cigarettes     Quit date: 2022     Years since quittin.6    Smokeless tobacco: Never    Tobacco comments:     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     Patient is currently retired   Substance Use Topics    Alcohol use: No    Drug use: No     Review of Systems   Constitutional:  Negative for diaphoresis, fatigue, fever and unexpected weight change.   HENT:  Negative for sore throat, trouble swallowing and voice change.    Eyes:  Negative for photophobia, redness and visual disturbance.   Respiratory:  Negative for cough, shortness of breath and wheezing.    Cardiovascular:  Negative for chest pain, palpitations and leg swelling.   Gastrointestinal:  Positive for abdominal pain and diarrhea. Negative for anal bleeding, blood in stool, constipation, nausea and vomiting.   Endocrine: Negative for polydipsia, polyphagia and polyuria.   Genitourinary:  Negative for dysuria, flank pain and frequency.   Musculoskeletal:  Negative for arthralgias, back pain and myalgias.   Skin:  Negative for rash and wound.    Neurological:  Negative for dizziness, tremors, syncope, weakness, light-headedness and headaches.   Hematological:  Negative for adenopathy. Does not bruise/bleed easily.   Psychiatric/Behavioral:  Negative for confusion and dysphoric mood. The patient is not nervous/anxious.      Physical Exam     Initial Vitals [11/03/22 1028]   BP Pulse Resp Temp SpO2   131/60 80 18 98.3 °F (36.8 °C) 96 %      MAP       --         Physical Exam    Nursing note and vitals reviewed.  Constitutional: He appears well-developed and well-nourished. He is not diaphoretic. No distress.   HENT:   Head: Normocephalic and atraumatic.   Right Ear: External ear normal.   Left Ear: External ear normal.   Eyes: Conjunctivae and EOM are normal. Pupils are equal, round, and reactive to light.   Neck: Neck supple.   Normal range of motion.  Cardiovascular:  Normal rate and regular rhythm.           Pulmonary/Chest: Breath sounds normal. No stridor.   Abdominal: Abdomen is soft. Bowel sounds are normal. There is abdominal tenderness in the right upper quadrant and left upper quadrant.   No right CVA tenderness.  No left CVA tenderness. There is no rebound and no guarding.   Genitourinary: Rectum:      Guaiac result negative.   Guaiac negative stool.   Musculoskeletal:         General: No tenderness or edema. Normal range of motion.      Cervical back: Normal range of motion and neck supple.     Neurological: He is alert and oriented to person, place, and time.   Negative Chvostek and trousseau's sign   Skin: Skin is warm and dry.   Psychiatric: He has a normal mood and affect.       ED Course   Procedures  Labs Reviewed   CBC W/ AUTO DIFFERENTIAL - Abnormal; Notable for the following components:       Result Value    RBC 4.43 (*)     Hemoglobin 11.1 (*)     Hematocrit 35.6 (*)     MCV 80 (*)     MCH 25.1 (*)     MCHC 31.2 (*)     RDW 14.6 (*)     Immature Granulocytes 1.3 (*)     Immature Grans (Abs) 0.11 (*)     Lymph # 0.9 (*)     Gran % 73.2  (*)     Lymph % 11.5 (*)     All other components within normal limits   COMPREHENSIVE METABOLIC PANEL - Abnormal; Notable for the following components:    CO2 17 (*)     BUN 25 (*)     Creatinine 2.8 (*)     Calcium 6.5 (*)     Albumin 3.0 (*)     eGFR 24.0 (*)     All other components within normal limits   URINALYSIS, REFLEX TO URINE CULTURE - Abnormal; Notable for the following components:    Protein, UA 1+ (*)     All other components within normal limits    Narrative:     Specimen Source->Urine   ISTAT PROCEDURE - Abnormal; Notable for the following components:    POC BUN 31 (*)     POC Creatinine 2.9 (*)     POC TCO2 (MEASURED) 21 (*)     POC Ionized Calcium 0.77 (*)     POC Hematocrit 34 (*)     All other components within normal limits   HIV 1 / 2 ANTIBODY    Narrative:     Release to patient->Immediate   LIPASE   TACROLIMUS LEVEL    Narrative:     ADD ON MG ORD#[id number] PER MD 11/03/22 @1322   MAGNESIUM   MAGNESIUM    Narrative:     ADD ON MG ORD#[id number] PER MD 11/03/22 @1322   URINALYSIS MICROSCOPIC    Narrative:     Specimen Source->Urine   ISTAT CHEM8          Imaging Results    None          Medications   calcium gluconate 1 g in NS IVPB (premixed) (1 g Intravenous New Bag 11/3/22 1511)   dicyclomine capsule 20 mg (20 mg Oral Given 11/3/22 1353)     Medical Decision Making:   Clinical Tests:   Lab Tests: Ordered  ED Management:  66 y.o. year old male presenting with abdominal cramping with intermittent diarrhea (now resolved).  He had similar compliant during admission 06/2022.  On exam patient is afebrile and nontoxic. HEENT exam normal. Heart rate and rhythm are regular. Lungs with clear breath sounds throughout. Abdomen is soft, mild tenderness RUQ, LUQ, no guadring, no CVA tenderness.  No edema.    DDx includes but is not limited to electrolyte abnormalities, gastroenteritis, KATINA.  No suspicion for C Diff at this time.    ED workup reveals Cr 2.8 (baseline 2.5-2.8), hypocalcemia corrected to  7.3, mild acidosis.  Lipase WNL.  Hgb at baseline and no leukocytosis.    Plan discussed case with nephrology and kidney function has been stable, Cr 2.8 is new baseline.  Recommended calcium chloride IV (pharmacy recommended changing to calcium gluconate due to side effects, staff agreeable).  Continue bicarb and paricalcitol at home.  Contact transplant coordinator and repeat CMP in 3 days.  Follow up nephrology outpatient.  Placed referral again to GI.  Discharge with bentyl.    Discussed findings and plan with patient who verbalized understanding and agrees with the plan and course of treatment. Return to ED precautions discussed. Patient is stable for discharge. I discussed the care of this patient with my supervising physician.                           Clinical Impression:   Final diagnoses:  [R10.9] Abdominal cramping (Primary)  [E83.51] Hypocalcemia  [E87.20] Metabolic acidosis      ED Disposition Condition    Discharge Stable          ED Prescriptions       Medication Sig Dispense Start Date End Date Auth. Provider    dicyclomine (BENTYL) 20 mg tablet Take 1 tablet (20 mg total) by mouth 2 (two) times daily. 20 tablet 11/3/2022 12/3/2022 Ashlee Neal PA-C          Follow-up Information    None          Ashlee Neal PA-C  11/03/22 1532

## 2022-11-03 NOTE — ED NOTES
Patient reports spasms and cramping in abdomen x 1 week, states got better then worse, kidney transplant 2005

## 2022-11-03 NOTE — ED NOTES
Patient identifiers verified and correct for Mr Phelps  C/C:Abd pain SEE NN  APPEARANCE: awake and alert in NAD.  SKIN: warm, dry and intact. No breakdown or bruising.  MUSCULOSKELETAL: Patient moving all extremities spontaneously, no obvious swelling or deformities noted. Ambulates independently.  RESPIRATORY: Denies shortness of breath.Respirations unlabored. Denies fevers  CARDIAC: Denies CP, 2+ distal pulses; no peripheral edema  ABDOMEN: ABdomen soft, paint o upper abdomen, denies nausea, vomiting, diarrhea x3 yeaterday   : voids spontaneously, denies difficulty  Neurologic: AAO x 4; follows commands equal strength in all extremities; denies numbness/tingling. Denies dizziness  Denies new weakness

## 2022-11-03 NOTE — ED NOTES
I-STAT Chem-8+ Results:   Value Reference Range   Sodium 141 136-145 mmol/L   Potassium  4.3 3.5-5.1 mmol/L   Chloride 109  mmol/L   Ionized Calcium 0.77 1.06-1.42 mmol/L   CO2 (measured) 21 23-29 mmol/L   Glucose 108  mg/dL   BUN 31 6-30 mg/dL   Creatinine 2.9 0.5-1.4 mg/dL   Hematocrit 34 36-54%

## 2022-11-05 ENCOUNTER — PATIENT MESSAGE (OUTPATIENT)
Dept: INTERNAL MEDICINE | Facility: CLINIC | Age: 67
End: 2022-11-05
Payer: MEDICARE

## 2022-11-06 ENCOUNTER — HOSPITAL ENCOUNTER (EMERGENCY)
Facility: HOSPITAL | Age: 67
Discharge: HOME | End: 2022-11-06
Attending: EMERGENCY MEDICINE
Payer: MEDICARE

## 2022-11-06 VITALS
SYSTOLIC BLOOD PRESSURE: 140 MMHG | RESPIRATION RATE: 16 BRPM | BODY MASS INDEX: 31.81 KG/M2 | HEIGHT: 73 IN | DIASTOLIC BLOOD PRESSURE: 66 MMHG | OXYGEN SATURATION: 98 % | TEMPERATURE: 98 F | HEART RATE: 68 BPM | WEIGHT: 240 LBS

## 2022-11-06 DIAGNOSIS — R10.9 ABDOMINAL CRAMPING: Primary | ICD-10-CM

## 2022-11-06 LAB
ALBUMIN SERPL BCP-MCNC: 3 G/DL (ref 3.5–5.2)
ALP SERPL-CCNC: 63 U/L (ref 55–135)
ALT SERPL W/O P-5'-P-CCNC: 20 U/L (ref 10–44)
ANION GAP SERPL CALC-SCNC: 15 MMOL/L (ref 8–16)
AST SERPL-CCNC: 26 U/L (ref 10–40)
BACTERIA #/AREA URNS AUTO: NORMAL /HPF
BASOPHILS # BLD AUTO: 0.02 K/UL (ref 0–0.2)
BASOPHILS NFR BLD: 0.2 % (ref 0–1.9)
BILIRUB SERPL-MCNC: 0.3 MG/DL (ref 0.1–1)
BILIRUB UR QL STRIP: NEGATIVE
BUN SERPL-MCNC: 28 MG/DL (ref 8–23)
CALCIUM SERPL-MCNC: 7.3 MG/DL (ref 8.7–10.5)
CHLORIDE SERPL-SCNC: 107 MMOL/L (ref 95–110)
CLARITY UR REFRACT.AUTO: CLEAR
CO2 SERPL-SCNC: 21 MMOL/L (ref 23–29)
COLOR UR AUTO: YELLOW
CREAT SERPL-MCNC: 2.5 MG/DL (ref 0.5–1.4)
DIFFERENTIAL METHOD: ABNORMAL
EOSINOPHIL # BLD AUTO: 0.1 K/UL (ref 0–0.5)
EOSINOPHIL NFR BLD: 1.3 % (ref 0–8)
ERYTHROCYTE [DISTWIDTH] IN BLOOD BY AUTOMATED COUNT: 14.4 % (ref 11.5–14.5)
EST. GFR  (NO RACE VARIABLE): 27.5 ML/MIN/1.73 M^2
GLUCOSE SERPL-MCNC: 100 MG/DL (ref 70–110)
GLUCOSE UR QL STRIP: NEGATIVE
HCT VFR BLD AUTO: 38.6 % (ref 40–54)
HGB BLD-MCNC: 11.5 G/DL (ref 14–18)
HGB UR QL STRIP: NEGATIVE
HYALINE CASTS UR QL AUTO: 1 /LPF
IMM GRANULOCYTES # BLD AUTO: 0.04 K/UL (ref 0–0.04)
IMM GRANULOCYTES NFR BLD AUTO: 0.4 % (ref 0–0.5)
KETONES UR QL STRIP: NEGATIVE
LEUKOCYTE ESTERASE UR QL STRIP: NEGATIVE
LIPASE SERPL-CCNC: 21 U/L (ref 4–60)
LYMPHOCYTES # BLD AUTO: 0.7 K/UL (ref 1–4.8)
LYMPHOCYTES NFR BLD: 7.4 % (ref 18–48)
MCH RBC QN AUTO: 24.1 PG (ref 27–31)
MCHC RBC AUTO-ENTMCNC: 29.8 G/DL (ref 32–36)
MCV RBC AUTO: 81 FL (ref 82–98)
MICROSCOPIC COMMENT: NORMAL
MONOCYTES # BLD AUTO: 0.8 K/UL (ref 0.3–1)
MONOCYTES NFR BLD: 8.4 % (ref 4–15)
NEUTROPHILS # BLD AUTO: 8.1 K/UL (ref 1.8–7.7)
NEUTROPHILS NFR BLD: 82.3 % (ref 38–73)
NITRITE UR QL STRIP: NEGATIVE
NRBC BLD-RTO: 0 /100 WBC
PH UR STRIP: 5 [PH] (ref 5–8)
PLATELET # BLD AUTO: 282 K/UL (ref 150–450)
PMV BLD AUTO: 11.1 FL (ref 9.2–12.9)
POTASSIUM SERPL-SCNC: 4.4 MMOL/L (ref 3.5–5.1)
PROT SERPL-MCNC: 6.5 G/DL (ref 6–8.4)
PROT UR QL STRIP: ABNORMAL
RBC # BLD AUTO: 4.77 M/UL (ref 4.6–6.2)
RBC #/AREA URNS AUTO: 2 /HPF (ref 0–4)
SODIUM SERPL-SCNC: 143 MMOL/L (ref 136–145)
SP GR UR STRIP: 1.01 (ref 1–1.03)
URN SPEC COLLECT METH UR: ABNORMAL
WBC # BLD AUTO: 9.81 K/UL (ref 3.9–12.7)
WBC #/AREA URNS AUTO: 4 /HPF (ref 0–5)

## 2022-11-06 PROCEDURE — 99284 EMERGENCY DEPT VISIT MOD MDM: CPT | Mod: ,,, | Performed by: PHYSICIAN ASSISTANT

## 2022-11-06 PROCEDURE — 99284 PR EMERGENCY DEPT VISIT,LEVEL IV: ICD-10-PCS | Mod: ,,, | Performed by: PHYSICIAN ASSISTANT

## 2022-11-06 PROCEDURE — 99284 EMERGENCY DEPT VISIT MOD MDM: CPT | Mod: 25

## 2022-11-06 PROCEDURE — 80053 COMPREHEN METABOLIC PANEL: CPT | Performed by: PHYSICIAN ASSISTANT

## 2022-11-06 PROCEDURE — 85025 COMPLETE CBC W/AUTO DIFF WBC: CPT | Performed by: PHYSICIAN ASSISTANT

## 2022-11-06 PROCEDURE — 81001 URINALYSIS AUTO W/SCOPE: CPT | Performed by: PHYSICIAN ASSISTANT

## 2022-11-06 PROCEDURE — 36000 PLACE NEEDLE IN VEIN: CPT

## 2022-11-06 PROCEDURE — 83690 ASSAY OF LIPASE: CPT | Performed by: PHYSICIAN ASSISTANT

## 2022-11-06 RX ORDER — HYOSCYAMINE SULFATE 0.12 MG/1
0.12 TABLET SUBLINGUAL EVERY 4 HOURS PRN
Qty: 12 TABLET | Refills: 0 | Status: SHIPPED | OUTPATIENT
Start: 2022-11-06 | End: 2022-11-17 | Stop reason: ALTCHOICE

## 2022-11-06 RX ORDER — SUCRALFATE 1 G/1
1 TABLET ORAL 4 TIMES DAILY PRN
Qty: 20 TABLET | Refills: 0 | Status: SHIPPED | OUTPATIENT
Start: 2022-11-06 | End: 2022-12-17

## 2022-11-06 RX ORDER — OMEPRAZOLE 20 MG/1
20 CAPSULE, DELAYED RELEASE ORAL DAILY
Qty: 14 CAPSULE | Refills: 0 | Status: ON HOLD | OUTPATIENT
Start: 2022-11-06 | End: 2023-10-20 | Stop reason: SDUPTHER

## 2022-11-06 NOTE — ED TRIAGE NOTES
Pt reports having abdominal spasms in the umbilical region for two weeks. Pt states he was seen here Thursday and was given dicyclomine to help with the stomach spasms but has not been having any relief. Pt reports hx of A-fib, HTN, and Kidney disease. Pt AAOx4.

## 2022-11-06 NOTE — ED PROVIDER NOTES
"Encounter Date: 11/6/2022       History     Chief Complaint   Patient presents with    Abdominal Pain    Abdominal Cramping     Seen thurs, still having bad cramps     66-year-old male with history of kidney transplant x2 (1992 and 2005), hypertension, hyperlipidemia, CAD, AFib on Eliquis, CHF presents to the ED complaining of abdominal pain.  The pain has been intermittent, present for a few weeks "cramping", worse with any sort of pressure on the abdomen.  He has tried Pepto-Bismol, Kaopectate, Gas-X with no improvement of his symptoms.  He denies any current pain but on the drive over here had to pull over on the side of the road due to severe abdominal pain.  He reports nausea, occasional vomiting, lightheadedness, decreased appetite.  He did have diarrhea at the onset of symptoms for a week but that has since resolved.  He denies fever, chills, chest pain, shortness of breath, dysuria, headache.    The history is provided by the patient.   Review of patient's allergies indicates:   Allergen Reactions    Ace inhibitors Swelling    Verapamil Other (See Comments)     Other reaction(s): Unknown    Povidone-iodine Itching     Past Medical History:   Diagnosis Date    (HFpEF) heart failure with preserved ejection fraction 9/25/2017    Atrial fibrillation     CAD (coronary artery disease)     CHF (congestive heart failure)     Chronic diastolic heart failure 4/8/2020    CKD (chronic kidney disease) stage 3, GFR 30-59 ml/min     Dr. Latif    CKD (chronic kidney disease) stage 3, GFR 30-59 ml/min     Diverticulosis     Fever blister     Heart attack 2006    Hyperlipidemia     Hypertension     Immunodeficiency due to treatment with immunosuppressive medication     Keloid cicatrix     Metabolic bone disease     Pericarditis     S/P kidney transplant     Supraventricular tachycardia 06/2019    Thrombocytopenia     Thyroid disease     Tobacco use 9/5/2014    Unspecified disorder of kidney and ureter     Stage IIICKD " "    Past Surgical History:   Procedure Laterality Date    CARDIOVERSION  04/30/15    CHOLECYSTECTOMY      COLONOSCOPY  April 20, 2011    Diverticulosis, repeat recommended in 3 yrs., repeat colonoscopy 2014 revealed 2 polyps.  He should return in 5 years.    COLONOSCOPY N/A 3/13/2020    Procedure: COLONOSCOPY;  Surgeon: Chon Casper MD;  Location: [facility] (4TH FLR);  Service: Endoscopy;  Laterality: N/A;  ok to hold coumadin x5days-see telephone encounter 2/4/20-tb    COLONOSCOPY N/A 2/4/2021    Procedure: COLONOSCOPY;  Surgeon: Chon Casper MD;  Location: [facility] (4TH FLR);  Service: Endoscopy;  Laterality: N/A;  Eliquis - per Dr. GAVIN Blunt ok to hold x 2 days and "restarted asap"- ERW  last prep "poor", [id number] ordered/ Pt requests Dr. Casper  prep ins. mailed - COVID screening on 2/1/21 PCW- ERW    COLONOSCOPY N/A 3/3/2021    Procedure: COLONOSCOPY;  Surgeon: Chon Casper MD;  Location: [facility] (4TH FLR);  Service: Endoscopy;  Laterality: N/A;  Patient with his second poor bowel pre and has poor prep today.  If patient not intersted or can't get colonoscopy tomorrow will need constipation bowel prep and will need to restart his Eliquis today.Thanks,Chon Blunt-ok to hold Eliquis 2 days prior      COVID     CORONARY ANGIOPLASTY WITH STENT PLACEMENT  9/13/2006    KIDNEY TRANSPLANT  2005    PARATHYROIDECTOMY      TREATMENT OF CARDIAC ARRHYTHMIA N/A 3/28/2022    Procedure: CARDIOVERSION;  Surgeon: Migue Blunt MD;  Location: [facility] EP LAB;  Service: Cardiology;  Laterality: N/A;  AF, DCC, MAC, GP, 3 PREP*RODRIGO deferred pt 100% compliant*     Family History   Problem Relation Age of Onset    No Known Problems Sister     No Known Problems Brother     Thyroid disease Mother         s/p surgery    Heart disease Father         had pacemaker    No Known Problems Sister     Kidney failure Sister     Kidney disease Sister     No Known Problems Sister     Kidney disease Brother     Kidney " failure Brother         s/p transplant    Diabetes Mellitus Neg Hx     Stroke Neg Hx     Heart attack Neg Hx     Cancer Neg Hx     Celiac disease Neg Hx     Cirrhosis Neg Hx     Colon cancer Neg Hx     Esophageal cancer Neg Hx     Inflammatory bowel disease Neg Hx     Rectal cancer Neg Hx     Stomach cancer Neg Hx     Ulcerative colitis Neg Hx     Liver disease Neg Hx     Liver cancer Neg Hx     Crohn's disease Neg Hx     Melanoma Neg Hx      Social History     Tobacco Use    Smoking status: Former     Packs/day: 0.50     Years: 40.00     Pack years: 20.00     Types: Cigarettes     Quit date: 2022     Years since quittin.6    Smokeless tobacco: Never    Tobacco comments:     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     Patient is currently retired   Substance Use Topics    Alcohol use: No    Drug use: No     Review of Systems   Constitutional:  Positive for appetite change (decreased). Negative for chills and fever.   HENT:  Negative for congestion and sore throat.    Respiratory:  Negative for cough and shortness of breath.    Cardiovascular:  Negative for chest pain.   Gastrointestinal:  Positive for abdominal pain, diarrhea, nausea and vomiting. Negative for constipation.   Genitourinary:  Negative for dysuria and hematuria.   Musculoskeletal:  Negative for back pain.   Skin:  Negative for rash.   Neurological:  Positive for light-headedness. Negative for weakness, numbness and headaches.   Psychiatric/Behavioral:  Negative for confusion.      Physical Exam     Initial Vitals [22 1049]   BP Pulse Resp Temp SpO2   130/60 74 18 98.3 °F (36.8 °C) 99 %      MAP       --         Physical Exam    Nursing note and vitals reviewed.  Constitutional: He appears well-developed and well-nourished. He is not diaphoretic. No distress.   HENT:   Head: Normocephalic and atraumatic.   Neck: Neck supple.   Normal range of motion.  Cardiovascular:  Normal rate, regular rhythm and  normal heart sounds.     Exam reveals no gallop and no friction rub.       No murmur heard.  No LE edema   Pulmonary/Chest: Breath sounds normal. He has no wheezes. He has no rhonchi. He has no rales.   Abdominal: Abdomen is soft. Bowel sounds are normal. There is abdominal tenderness (R mid abdomen and periumbilical region).   Multiple abdominal surgical scars noted.  No tenderness in the left lower quadrant over transplanted kidney. There is no rebound and no guarding.   Musculoskeletal:         General: Normal range of motion.      Cervical back: Normal range of motion and neck supple.     Neurological: He is alert and oriented to person, place, and time.   Skin: Skin is warm and dry. No rash noted. No erythema.   Psychiatric: He has a normal mood and affect.       ED Course   Procedures  Labs Reviewed   CBC W/ AUTO DIFFERENTIAL - Abnormal; Notable for the following components:       Result Value    Hemoglobin 11.5 (*)     Hematocrit 38.6 (*)     MCV 81 (*)     MCH 24.1 (*)     MCHC 29.8 (*)     Gran # (ANC) 8.1 (*)     Lymph # 0.7 (*)     Gran % 82.3 (*)     Lymph % 7.4 (*)     All other components within normal limits   COMPREHENSIVE METABOLIC PANEL - Abnormal; Notable for the following components:    CO2 21 (*)     BUN 28 (*)     Creatinine 2.5 (*)     Calcium 7.3 (*)     Albumin 3.0 (*)     eGFR 27.5 (*)     All other components within normal limits   URINALYSIS, REFLEX TO URINE CULTURE - Abnormal; Notable for the following components:    Protein, UA 2+ (*)     All other components within normal limits    Narrative:     Specimen Source->Urine   LIPASE   URINALYSIS MICROSCOPIC    Narrative:     Specimen Source->Urine          Imaging Results              CT Abdomen Pelvis  Without Contrast (Final result)  Result time 11/06/22 13:09:51      Final result by Scott Damico MD (11/06/22 13:09:51)                   Impression:      No acute abdominopelvic abnormality.    Hepatomegaly.    Multicystic atrophic  native kidneys bilaterally.  Left lower quadrant renal transplant    Diverticulosis without evidence of diverticulitis.    Additional findings as above.    Electronically signed by resident: Julian Recinos  Date:    11/06/2022  Time:    12:44    Electronically signed by: Scott Damico MD  Date:    11/06/2022  Time:    13:09               Narrative:    EXAMINATION:  CT ABDOMEN PELVIS WITHOUT CONTRAST    CLINICAL HISTORY:  Abdominal pain, acute, nonlocalized;    TECHNIQUE:  Low dose axial images, sagittal and coronal reformations were obtained from the lung bases to the pubic symphysis, Oral contrast was not administered.    COMPARISON:  CT 06/04/2022    FINDINGS:  Heart: Normal in size. No pericardial effusion.    Lung Bases: Left lower lobe subsegmental atelectasis.    Liver: Enlarged measuring 19.6 cm.  No focal lesions.    Gallbladder: Surgically absent    Bile Ducts: No evidence of dilated ducts.    Pancreas: No mass or peripancreatic fat stranding.    Spleen: Unremarkable.    Adrenals: Nodular thickening of the left adrenal gland.    Kidneys/ Ureters: Multicystic atrophic native kidneys with numerous parenchymal calcifications.  Some of cysts measure slightly higher than expected for simple cyst.  No hydronephrosis    Left lower quadrant allograft.  There is vascular calcifications.  No hydronephrosis or nephrolithiasis.    Bladder: No evidence of wall thickening.    Reproductive organs: Unremarkable.    GI Tract/Mesentery: Small-sized hiatal hernia.  Colonic diverticulosis without evidence of diverticulitis.  Appendix is unremarkable.  No evidence of high-grade obstruction or inflammation.    Peritoneal Space: No ascites. No free air.    Retroperitoneum: No significant adenopathy.    Abdominal wall: Unremarkable.    Vasculature: Moderate atherosclerotic calcifications of the infrarenal aorta and iliacs.    Bones: Stable degenerative spine changes.  No acute fracture.                                        Medications - No data to display  Medical Decision Making:   History:   Old Medical Records: I decided to obtain old medical records.  Old Records Summarized: records from clinic visits and records from previous admission(s).       <> Summary of Records: ED visit 11/3 with the same complaint.  Labs done.  Discharged with a prescription for Bentyl  Clinical Tests:   Lab Tests: Ordered and Reviewed  Radiological Study: Reviewed and Ordered     APC / Resident Notes:   66-year-old male with history of kidney transplant x2 (1992 and 2005), hypertension, hyperlipidemia, CAD, AFib on Eliquis, CHF presents to the ED complaining of abdominal pain.  Vital signs stable.  Well-appearing.  Abdomen is soft, tender to the right mid abdomen and periumbilical region.  There is no tenderness in the left lower quadrant over the transplanted kidney.  No lower extremity edema.  He denies any current abdominal pain.  Differential diagnosis includes but is not limited to dehydration, acute kidney injury, SBO, colitis, gastritis, intra-abdominal mass.  Will get labs, CT abdomen and pelvis.  Since he has a kidney transplant patient, will get dry CT.    UA with no infection. No leukocytosis. Cr at baseline. Ca improved. Lipase normal.    CT abdomen/pelvis with no acute abnormalities.    Unclear etiology of abdominal pain. Denies any current pain. I do not feel that he needs any further labs or imaging at this time. Stable for discharge.    He was discharged with prescriptions for prilosec, carafate, levsin.  He will follow up with his PCP and GI.  Strict ED return precautions given.  All of the patient's questions were answered.  I reviewed the patient's chart, labs, and imaging and discussed the case with my supervising physician.          Attending Attestation:     Physician Attestation Statement for NP/PA:   I discussed this assessment and plan of this patient with the NP/PA, but I did not personally examine the patient. The face to  face encounter was performed by the NP/PA.                         Clinical Impression:   Final diagnoses:  [R10.9] Abdominal cramping (Primary)      ED Disposition Condition    Discharge Stable          ED Prescriptions       Medication Sig Dispense Start Date End Date Auth. Provider    omeprazole (PRILOSEC) 20 MG capsule Take 1 capsule (20 mg total) by mouth once daily. 14 capsule 11/6/2022 11/6/2023 Nora Hawkins PA-C    sucralfate (CARAFATE) 1 gram tablet Take 1 tablet (1 g total) by mouth 4 (four) times daily as needed (abdominal pain). 20 tablet 11/6/2022 -- Nora Hawkins PA-C    hyoscyamine (LEVSIN/SL) 0.125 mg Subl Place 1 tablet (0.125 mg total) under the tongue every 4 (four) hours as needed (abdominal cramping). 12 tablet 11/6/2022 -- Nora Hawkins PA-C          Follow-up Information       Follow up With Specialties Details Why Contact Info Additional Information    Connie Magdaleno MD Internal Medicine   [address]  [place] [address]  [phone]       [facility] [address] Gastroenter[facility]   [address] [facility] [id number] [phone] [facility] - Main Building, 4th Floor Please park in [facility] and take [facility] elevator             Nora Hawkins PA-C  11/06/22 [id number]       Jerome Stallworth Jr., MD  11/06/22 [id number]

## 2022-11-07 ENCOUNTER — TELEPHONE (OUTPATIENT)
Dept: INTERNAL MEDICINE | Facility: CLINIC | Age: 67
End: 2022-11-07
Payer: MEDICARE

## 2022-11-07 NOTE — TELEPHONE ENCOUNTER
Left pt a voicemail instructing him to call back so that he can be assisted with scheduling.    Keegan RICO

## 2022-11-07 NOTE — TELEPHONE ENCOUNTER
----- Message from Misael Worthy sent at 11/5/2022  1:56 PM CDT -----  Regarding: Appt  Contact: [phone]  Patient is calling to schedule appointment for Hospital Follow up no dates in epic.please contact pt

## 2022-11-08 ENCOUNTER — TELEPHONE (OUTPATIENT)
Dept: GASTROENTEROLOGY | Facility: CLINIC | Age: 67
End: 2022-11-08
Payer: MEDICARE

## 2022-11-08 ENCOUNTER — PATIENT MESSAGE (OUTPATIENT)
Dept: INTERNAL MEDICINE | Facility: CLINIC | Age: 67
End: 2022-11-08
Payer: MEDICARE

## 2022-11-08 NOTE — TELEPHONE ENCOUNTER
----- Message from Bassam Ramirez sent at 11/8/2022 12:40 PM CST -----  Contact: Patti-wife  Pt wife requesting call back RE: would like to schedule appt from referral for Abdominal cramping, nothing available until 1/17. And pt would like to be seen sooner. Please call      Confirmed contact below:  Contact Name:Ibrahima Phelps  Phone Number: [phone]

## 2022-11-08 NOTE — TELEPHONE ENCOUNTER
Spoke with patient.  Scheduled to see  on 1/9/2023 for 3:00.   Confirmation mailed.   Patient has also been placed on the cancellation list.   Crystal

## 2022-11-17 ENCOUNTER — OFFICE VISIT (OUTPATIENT)
Dept: INTERNAL MEDICINE | Facility: CLINIC | Age: 67
End: 2022-11-17
Attending: FAMILY MEDICINE
Payer: MEDICARE

## 2022-11-17 VITALS
BODY MASS INDEX: 31.14 KG/M2 | SYSTOLIC BLOOD PRESSURE: 118 MMHG | DIASTOLIC BLOOD PRESSURE: 60 MMHG | HEIGHT: 73 IN | WEIGHT: 235 LBS | HEART RATE: 65 BPM | OXYGEN SATURATION: 98 %

## 2022-11-17 DIAGNOSIS — I25.10 CORONARY ARTERY DISEASE INVOLVING NATIVE CORONARY ARTERY OF NATIVE HEART WITHOUT ANGINA PECTORIS: ICD-10-CM

## 2022-11-17 DIAGNOSIS — N18.4 ANEMIA DUE TO STAGE 4 CHRONIC KIDNEY DISEASE: ICD-10-CM

## 2022-11-17 DIAGNOSIS — Z94.0 IMMUNOSUPPRESSIVE MANAGEMENT ENCOUNTER FOLLOWING KIDNEY TRANSPLANT: ICD-10-CM

## 2022-11-17 DIAGNOSIS — R19.7 DIARRHEA, UNSPECIFIED TYPE: ICD-10-CM

## 2022-11-17 DIAGNOSIS — N18.4 CKD (CHRONIC KIDNEY DISEASE) STAGE 4, GFR 15-29 ML/MIN: ICD-10-CM

## 2022-11-17 DIAGNOSIS — Z79.01 LONG TERM (CURRENT) USE OF ANTICOAGULANTS: ICD-10-CM

## 2022-11-17 DIAGNOSIS — R06.09 DOE (DYSPNEA ON EXERTION): ICD-10-CM

## 2022-11-17 DIAGNOSIS — R53.83 FATIGUE, UNSPECIFIED TYPE: Primary | ICD-10-CM

## 2022-11-17 DIAGNOSIS — D63.1 ANEMIA DUE TO STAGE 4 CHRONIC KIDNEY DISEASE: ICD-10-CM

## 2022-11-17 DIAGNOSIS — Z94.0 H/O KIDNEY TRANSPLANT: ICD-10-CM

## 2022-11-17 DIAGNOSIS — I48.91 ATRIAL FIBRILLATION, UNSPECIFIED TYPE: ICD-10-CM

## 2022-11-17 DIAGNOSIS — R10.9 ABDOMINAL PAIN, UNSPECIFIED ABDOMINAL LOCATION: ICD-10-CM

## 2022-11-17 DIAGNOSIS — Z79.899 IMMUNOSUPPRESSIVE MANAGEMENT ENCOUNTER FOLLOWING KIDNEY TRANSPLANT: ICD-10-CM

## 2022-11-17 PROBLEM — R10.32 LEFT LOWER QUADRANT ABDOMINAL PAIN: Status: RESOLVED | Noted: 2022-06-05 | Resolved: 2022-11-17

## 2022-11-17 PROCEDURE — 1126F PR PAIN SEVERITY QUANTIFIED, NO PAIN PRESENT: ICD-10-PCS | Mod: CPTII,S$GLB,, | Performed by: FAMILY MEDICINE

## 2022-11-17 PROCEDURE — 3078F PR MOST RECENT DIASTOLIC BLOOD PRESSURE < 80 MM HG: ICD-10-PCS | Mod: CPTII,S$GLB,, | Performed by: FAMILY MEDICINE

## 2022-11-17 PROCEDURE — 3008F PR BODY MASS INDEX (BMI) DOCUMENTED: ICD-10-PCS | Mod: CPTII,S$GLB,, | Performed by: FAMILY MEDICINE

## 2022-11-17 PROCEDURE — 1126F AMNT PAIN NOTED NONE PRSNT: CPT | Mod: CPTII,S$GLB,, | Performed by: FAMILY MEDICINE

## 2022-11-17 PROCEDURE — 99499 UNLISTED E&M SERVICE: CPT | Mod: S$GLB,,, | Performed by: FAMILY MEDICINE

## 2022-11-17 PROCEDURE — 1159F PR MEDICATION LIST DOCUMENTED IN MEDICAL RECORD: ICD-10-PCS | Mod: CPTII,S$GLB,, | Performed by: FAMILY MEDICINE

## 2022-11-17 PROCEDURE — 3044F HG A1C LEVEL LT 7.0%: CPT | Mod: CPTII,S$GLB,, | Performed by: FAMILY MEDICINE

## 2022-11-17 PROCEDURE — 99214 PR OFFICE/OUTPT VISIT, EST, LEVL IV, 30-39 MIN: ICD-10-PCS | Mod: S$GLB,,, | Performed by: FAMILY MEDICINE

## 2022-11-17 PROCEDURE — 3066F PR DOCUMENTATION OF TREATMENT FOR NEPHROPATHY: ICD-10-PCS | Mod: CPTII,S$GLB,, | Performed by: FAMILY MEDICINE

## 2022-11-17 PROCEDURE — 1160F PR REVIEW ALL MEDS BY PRESCRIBER/CLIN PHARMACIST DOCUMENTED: ICD-10-PCS | Mod: CPTII,S$GLB,, | Performed by: FAMILY MEDICINE

## 2022-11-17 PROCEDURE — 1101F PT FALLS ASSESS-DOCD LE1/YR: CPT | Mod: CPTII,S$GLB,, | Performed by: FAMILY MEDICINE

## 2022-11-17 PROCEDURE — 99999 PR PBB SHADOW E&M-EST. PATIENT-LVL V: CPT | Mod: PBBFAC,,, | Performed by: FAMILY MEDICINE

## 2022-11-17 PROCEDURE — 1101F PR PT FALLS ASSESS DOC 0-1 FALLS W/OUT INJ PAST YR: ICD-10-PCS | Mod: CPTII,S$GLB,, | Performed by: FAMILY MEDICINE

## 2022-11-17 PROCEDURE — 3008F BODY MASS INDEX DOCD: CPT | Mod: CPTII,S$GLB,, | Performed by: FAMILY MEDICINE

## 2022-11-17 PROCEDURE — 3288F PR FALLS RISK ASSESSMENT DOCUMENTED: ICD-10-PCS | Mod: CPTII,S$GLB,, | Performed by: FAMILY MEDICINE

## 2022-11-17 PROCEDURE — 3044F PR MOST RECENT HEMOGLOBIN A1C LEVEL <7.0%: ICD-10-PCS | Mod: CPTII,S$GLB,, | Performed by: FAMILY MEDICINE

## 2022-11-17 PROCEDURE — 3074F SYST BP LT 130 MM HG: CPT | Mod: CPTII,S$GLB,, | Performed by: FAMILY MEDICINE

## 2022-11-17 PROCEDURE — 1159F MED LIST DOCD IN RCRD: CPT | Mod: CPTII,S$GLB,, | Performed by: FAMILY MEDICINE

## 2022-11-17 PROCEDURE — 3078F DIAST BP <80 MM HG: CPT | Mod: CPTII,S$GLB,, | Performed by: FAMILY MEDICINE

## 2022-11-17 PROCEDURE — 99999 PR PBB SHADOW E&M-EST. PATIENT-LVL V: ICD-10-PCS | Mod: PBBFAC,,, | Performed by: FAMILY MEDICINE

## 2022-11-17 PROCEDURE — 3066F NEPHROPATHY DOC TX: CPT | Mod: CPTII,S$GLB,, | Performed by: FAMILY MEDICINE

## 2022-11-17 PROCEDURE — 3074F PR MOST RECENT SYSTOLIC BLOOD PRESSURE < 130 MM HG: ICD-10-PCS | Mod: CPTII,S$GLB,, | Performed by: FAMILY MEDICINE

## 2022-11-17 PROCEDURE — 1160F RVW MEDS BY RX/DR IN RCRD: CPT | Mod: CPTII,S$GLB,, | Performed by: FAMILY MEDICINE

## 2022-11-17 PROCEDURE — 3288F FALL RISK ASSESSMENT DOCD: CPT | Mod: CPTII,S$GLB,, | Performed by: FAMILY MEDICINE

## 2022-11-17 PROCEDURE — 99214 OFFICE O/P EST MOD 30 MIN: CPT | Mod: S$GLB,,, | Performed by: FAMILY MEDICINE

## 2022-11-17 PROCEDURE — 99499 RISK ADDL DX/OHS AUDIT: ICD-10-PCS | Mod: S$GLB,,, | Performed by: FAMILY MEDICINE

## 2022-11-17 NOTE — PROGRESS NOTES
"Subjective:       Patient ID: Ibrahima Phelps is a 67 y.o. male.    Chief Complaint: Follow-up (Not feeling well x3 weeks)    New patient, same day urgent care presents - presents with wife concerns for fatigue for several weeks or longer, abdominal cramping that has resolved.  Primary complaint today is fatigue.  States he turns 67 earlier in the week and could not get out of bed that day.  Denied chest pain or new or worsening dyspnea, stating that he has chronic dyspnea that has not changed.  States this fatigue started after cardioversion which was approximately March 21, 2022.    Two emergency room visit follow-ups, November 3rd and November 6th.  Had laboratory performed and a CT scan on 11/06.  No acute findings on CT scan.  History of renal transplant, multiple medical diagnoses including kidney transplant x2 (1992 and 2005), hypertension, hyperlipidemia, CAD, AFib on Eliquis. Abdominal pain had been intermittent, present for a few weeks "cramping", worse with any sort of pressure on the abdomen.  He has tried Pepto-Bismol, Kaopectate, Gas-X with no improvement of his symptoms.  He denies any current pain but on the drive over here had to pull over on the side of the road due to severe abdominal pain.  He reports nausea, occasional vomiting, lightheadedness, decreased appetite.  He did have diarrhea at the onset of symptoms for a week but that has since resolved.  He denies fever, chills, chest pain, shortness of breath, dysuria, headache.    He has a follow-up GI appointment scheduled in January.  No follow-up scheduled with nephrologist, transplant.  PCP next month.    Review of Systems   Constitutional:  Positive for fatigue. Negative for appetite change, chills, diaphoresis and fever.   HENT:  Negative for congestion, postnasal drip, rhinorrhea, sore throat and trouble swallowing.    Eyes:  Negative for visual disturbance.   Respiratory:  Positive for shortness of breath. Negative for cough, choking, chest " tightness and wheezing.    Cardiovascular:  Negative for chest pain and leg swelling.   Gastrointestinal:  Negative for abdominal distention, abdominal pain, diarrhea, nausea and vomiting.   Genitourinary:  Negative for difficulty urinating and hematuria.   Musculoskeletal:  Negative for arthralgias and myalgias.   Skin:  Negative for rash.   Neurological:  Negative for weakness, light-headedness and headaches.   Psychiatric/Behavioral:  Positive for decreased concentration. Negative for confusion and dysphoric mood.      Objective:      Physical Exam  Vitals and nursing note reviewed.   Constitutional:       General: He is not in acute distress.     Appearance: Normal appearance. He is well-developed. He is not ill-appearing, toxic-appearing or diaphoretic.   HENT:      Head: Normocephalic and atraumatic.   Eyes:      General: No scleral icterus.     Conjunctiva/sclera: Conjunctivae normal.   Neck:      Vascular: No carotid bruit.   Cardiovascular:      Rate and Rhythm: Normal rate and regular rhythm.      Heart sounds: Heart sounds are distant. No murmur heard.    No friction rub. No gallop.   Pulmonary:      Effort: Pulmonary effort is normal. No respiratory distress.      Breath sounds: Normal breath sounds. No wheezing or rales.   Abdominal:      General: There is no distension.      Tenderness: There is no abdominal tenderness. There is no guarding or rebound.   Musculoskeletal:         General: No deformity.      Cervical back: Normal range of motion and neck supple.   Skin:     General: Skin is warm and dry.      Findings: No erythema or rash.   Neurological:      Mental Status: He is alert and oriented to person, place, and time.      Cranial Nerves: No cranial nerve deficit.      Motor: No tremor.      Coordination: Coordination normal.      Gait: Gait normal.   Psychiatric:         Behavior: Behavior normal.         Thought Content: Thought content normal.         Judgment: Judgment normal.        Assessment:       1. Fatigue, unspecified type    2. Abdominal pain, unspecified abdominal location    3. Diarrhea, unspecified type    4. Anemia due to stage 4 chronic kidney disease    5. CKD (chronic kidney disease) stage 4, GFR 15-29 ml/min    6. Atrial fibrillation, unspecified type    7. Coronary artery disease involving native coronary artery of native heart without angina pectoris    8. DAUGHERTY (dyspnea on exertion)    9. H/O kidney transplant    10. Immunosuppressive management encounter following kidney transplant    11. Long term (current) use of anticoagulants [V58.61]          Plan:     Medication List with Changes/Refills   Current Medications    ALBUTEROL (VENTOLIN HFA) 90 MCG/ACTUATION INHALER    Inhale 2 puffs into the lungs every 6 (six) hours as needed for Wheezing or Shortness of Breath. Rescue    AMMONIUM LACTATE 12 % CREA    Apply 1 g topically once daily.    ASPIRIN (ECOTRIN) 81 MG EC TABLET    Take 1 tablet by mouth Daily.    ATORVASTATIN (LIPITOR) 20 MG TABLET    Take 1 tablet (20 mg total) by mouth once daily.    CALCIUM CARBONATE (OS-TRISH) 500 MG CALCIUM (1,250 MG) TABLET    TAKE 2 TABLETS BY MOUTH TWICE A DAY    CLONAZEPAM (KLONOPIN) 0.5 MG TABLET    Take 1 tablet (0.5 mg total) by mouth 2 (two) times daily as needed for Anxiety.    COLCHICINE (MITIGARE) 0.6 MG CAP    One po BID prn gout flare up to 3 days    DICYCLOMINE (BENTYL) 20 MG TABLET    Take 1 tablet (20 mg total) by mouth 2 (two) times daily.    DOXAZOSIN (CARDURA) 4 MG TABLET    TAKE 1 TABLET (4 MG TOTAL) BY MOUTH EVERY 12 (TWELVE) HOURS.    ELIQUIS 5 MG TAB    TAKE 1 TABLET BY MOUTH TWICE A DAY    FAMOTIDINE (PEPCID) 20 MG TABLET    Take 1 tablet (20 mg total) by mouth 2 (two) times daily.    FEXOFENADINE (ALLEGRA) 60 MG TABLET    Take 1 tablet by mouth Twice daily as needed.    FLECAINIDE (TAMBOCOR) 50 MG TAB    Take 1 tablet (50 mg total) by mouth every 12 (twelve) hours.    FLUTICASONE PROPION-SALMETEROL 115-21 MCG/DOSE  (ADVAIR HFA) 115-21 MCG/ACTUATION HFAA INHALER    Inhale 2 puffs into the lungs every 12 (twelve) hours. Controller    FLUTICASONE PROPIONATE (FLONASE) 50 MCG/ACTUATION NASAL SPRAY    1 spray (50 mcg total) by Each Nostril route once daily.    FUROSEMIDE (LASIX) 40 MG TABLET    Take 1 tablet (40 mg total) by mouth 2 (two) times daily.    GABAPENTIN (NEURONTIN) 300 MG CAPSULE    TAKE 1 CAPSULE BY MOUTH TWICE A DAY    HYDRALAZINE (APRESOLINE) 50 MG TABLET    TAKE 1 TABLET (50 MG TOTAL) BY MOUTH 3 (THREE) TIMES DAILY.    KLOR-CON 10 10 MEQ TBSR    TAKE 1 TABLET BY MOUTH EVERY DAY    METOPROLOL SUCCINATE (TOPROL-XL) 25 MG 24 HR TABLET    Take 1 tablet (25 mg total) by mouth once daily.    MULTIVITAMIN (THERAGRAN) PER TABLET    Take 1 tablet by mouth Daily.    MYCOPHENOLATE (CELLCEPT) 250 MG CAP    Take 2 capsules (500 mg total) by mouth 2 (two) times daily.    NIFEDIPINE (PROCARDIA-XL) 60 MG (OSM) 24 HR TABLET    Take 1 tablet (60 mg total) by mouth 2 (two) times a day.    OMEPRAZOLE (PRILOSEC) 20 MG CAPSULE    Take 1 capsule (20 mg total) by mouth once daily.    PARICALCITOL (ZEMPLAR) 1 MCG CAPSULE    TAKE 1 TABLET BY MOUTH ON MONDAY, WEDNESDAY, & FRIDAY    PREDNISONE (DELTASONE) 5 MG TABLET    TAKE 1 TABLET BY MOUTH EVERY DAY IN THE MORNING    SPIRONOLACTONE (ALDACTONE) 25 MG TABLET    TAKE 1 TABLET BY MOUTH EVERY DAY    SUCRALFATE (CARAFATE) 1 GRAM TABLET    Take 1 tablet (1 g total) by mouth 4 (four) times daily as needed (abdominal pain).    TACROLIMUS (PROGRAF) 0.5 MG CAP    TAKE 1 CAPSULE BY MOUTH EVERY EVENING. (TAKE WITH 2 1MG CAPSULES)    TACROLIMUS (PROGRAF) 1 MG CAP    Two po in am and two po in pm    VITAMIN D 1000 UNITS TAB    Take 370 mg by mouth 2 (two) times daily. 2 tablets BID   Discontinued Medications    ATORVASTATIN (LIPITOR) 10 MG TABLET    TAKE 1 TABLET BY MOUTH EVERY DAY    HYOSCYAMINE (LEVSIN/SL) 0.125 MG SUBL    Place 1 tablet (0.125 mg total) under the tongue every 4 (four) hours as needed  (abdominal cramping).    PFIZER COVID-19 FREDO VACCN,PF, 30 MCG/0.3 ML INJECTION        TACROLIMUS (PROGRAF) 1 MG CAP    TAKE 2 CAPSULES BY MOUTH EVERY MORNING AND 1 CAPSULE EVERY EVENING. Z94.0 KIDNEY TRANSPLANT     1. Fatigue, unspecified type  -     CBC Auto Differential; Future; Expected date: 11/17/2022  -     Basic Metabolic Panel; Future; Expected date: 11/17/2022  -     TSH; Future; Expected date: 11/17/2022  -     Ambulatory referral/consult to Nephrology; Future; Expected date: 11/24/2022    2. Abdominal pain, unspecified abdominal location  Comments:  improved/resolved    3. Diarrhea, unspecified type  Comments:  improved/resolved    4. Anemia due to stage 4 chronic kidney disease    5. CKD (chronic kidney disease) stage 4, GFR 15-29 ml/min  -     Ambulatory referral/consult to Nephrology; Future; Expected date: 11/24/2022    6. Atrial fibrillation, unspecified type    7. Coronary artery disease involving native coronary artery of native heart without angina pectoris  Overview:  Negative stress echo 2009  ? Hx of pericarditis      8. DAUGHERTY (dyspnea on exertion)  Assessment & Plan:  Chronic and not changed      9. H/O kidney transplant  -     Ambulatory referral/consult to Nephrology; Future; Expected date: 11/24/2022    10. Immunosuppressive management encounter following kidney transplant    11. Long term (current) use of anticoagulants [V58.61]    See meds, orders, follow up, routing and instructions sections of encounter and AVS. Discussed with patient and provided on AVS.    Lab Results   Component Value Date     11/06/2022    K 4.4 11/06/2022     11/06/2022    BUN 28 (H) 11/06/2022    CREATININE 2.5 (H) 11/06/2022     11/06/2022    HGBA1C 5.4 09/08/2022    MG 2.2 11/03/2022    AST 26 11/06/2022    ALT 20 11/06/2022    ALBUMIN 3.0 (L) 11/06/2022    PROT 6.5 11/06/2022    BILITOT 0.3 11/06/2022    CHOL 145 03/08/2022    HDL 56 03/08/2022    LDLCALC 70.2 03/08/2022    TRIG 94 03/08/2022     WBC 9.81 11/06/2022    HGB 11.5 (L) 11/06/2022    HCT 38.6 (L) 11/06/2022    HCT 34 (L) 11/03/2022     11/06/2022    PSA 0.51 12/09/2020    TSH 1.212 08/10/2022     (H) 08/10/2022    URICACID 8.7 (H) 01/03/2022           Diabetes Management Status    Statin: Taking  ACE/ARB: Not taking    Screening or Prevention Patient's value Goal Complete/Controlled?   HgA1C Testing and Control   Lab Results   Component Value Date    HGBA1C 5.4 09/08/2022      Annually/Less than 8% Yes     Lipid profile : 03/08/2022 Annually Yes     LDL control Lab Results   Component Value Date    LDLCALC 70.2 03/08/2022    Annually/Less than 100 mg/dl  Yes     Nephropathy screening No results found for: LABMICR  Lab Results   Component Value Date    PROTEINUA 2+ (A) 11/06/2022     Lab Results   Component Value Date    UTPCR 2.12 (H) 09/08/2022      Annually Yes     Blood pressure BP Readings from Last 1 Encounters:   11/17/22 118/60    Less than 140/90 Yes     Dilated retinal exam : 08/05/2020 Annually No     Foot exam   : 02/01/2022 Annually Yes           Patient does not appear ill today, reports no fever.  Reports resolution of abdominal pain.  Fatigue is chronic.  Check basic laboratory, courtesy copy transplant and PCP.  Schedule urgent care symptoms recur.  Considerations include medication effect, chronic illness.  Nothing on exam or history today to suggest an acute infectious disease complication at this time.

## 2022-11-23 ENCOUNTER — TELEPHONE (OUTPATIENT)
Dept: GASTROENTEROLOGY | Facility: CLINIC | Age: 67
End: 2022-11-23
Payer: MEDICARE

## 2022-11-23 NOTE — TELEPHONE ENCOUNTER
----- Message from Monique Dumont MA sent at 11/23/2022  2:54 PM CST -----  Regarding: FW: Sooner appointment  Contact: Shea/wife [phone]    ----- Message -----  From: Nereyda Bravo  Sent: 11/23/2022   1:50 PM CST  To: [facility] Staff  Subject: Sooner appointment                               Calling to schedule a sooner appointment due to patient's increased abdominal pains. Please call and schedule.

## 2022-11-23 NOTE — TELEPHONE ENCOUNTER
Return call and spoke with patient wife Shea, inform of the provider schedule. Offered to go to another location Shea declined. Will check the patient portal for appointment details.

## 2022-11-30 ENCOUNTER — OFFICE VISIT (OUTPATIENT)
Dept: ORTHOPEDICS | Facility: CLINIC | Age: 67
End: 2022-11-30
Payer: MEDICARE

## 2022-11-30 ENCOUNTER — HOSPITAL ENCOUNTER (OUTPATIENT)
Dept: RADIOLOGY | Facility: HOSPITAL | Age: 67
Discharge: HOME | End: 2022-11-30
Attending: NURSE PRACTITIONER
Payer: MEDICARE

## 2022-11-30 VITALS — WEIGHT: 235 LBS | BODY MASS INDEX: 31.14 KG/M2 | HEIGHT: 73 IN

## 2022-11-30 DIAGNOSIS — M79.671 BILATERAL FOOT PAIN: ICD-10-CM

## 2022-11-30 DIAGNOSIS — M79.671 BILATERAL FOOT PAIN: Primary | ICD-10-CM

## 2022-11-30 DIAGNOSIS — M79.672 BILATERAL FOOT PAIN: ICD-10-CM

## 2022-11-30 DIAGNOSIS — M79.672 FOOT PAIN, BILATERAL: Primary | ICD-10-CM

## 2022-11-30 DIAGNOSIS — M79.671 FOOT PAIN, BILATERAL: Primary | ICD-10-CM

## 2022-11-30 DIAGNOSIS — M79.672 BILATERAL FOOT PAIN: Primary | ICD-10-CM

## 2022-11-30 PROCEDURE — 3008F BODY MASS INDEX DOCD: CPT | Mod: CPTII,S$GLB,, | Performed by: NURSE PRACTITIONER

## 2022-11-30 PROCEDURE — 3288F FALL RISK ASSESSMENT DOCD: CPT | Mod: CPTII,S$GLB,, | Performed by: NURSE PRACTITIONER

## 2022-11-30 PROCEDURE — 73630 X-RAY EXAM OF FOOT: CPT | Mod: TC,50

## 2022-11-30 PROCEDURE — 73630 XR FOOT COMPLETE 3 VIEW BILATERAL: ICD-10-PCS | Mod: 26,50,, | Performed by: RADIOLOGY

## 2022-11-30 PROCEDURE — 73630 X-RAY EXAM OF FOOT: CPT | Mod: 26,50,, | Performed by: RADIOLOGY

## 2022-11-30 PROCEDURE — 3066F PR DOCUMENTATION OF TREATMENT FOR NEPHROPATHY: ICD-10-PCS | Mod: CPTII,S$GLB,, | Performed by: NURSE PRACTITIONER

## 2022-11-30 PROCEDURE — 1101F PR PT FALLS ASSESS DOC 0-1 FALLS W/OUT INJ PAST YR: ICD-10-PCS | Mod: CPTII,S$GLB,, | Performed by: NURSE PRACTITIONER

## 2022-11-30 PROCEDURE — 3288F PR FALLS RISK ASSESSMENT DOCUMENTED: ICD-10-PCS | Mod: CPTII,S$GLB,, | Performed by: NURSE PRACTITIONER

## 2022-11-30 PROCEDURE — 1125F AMNT PAIN NOTED PAIN PRSNT: CPT | Mod: CPTII,S$GLB,, | Performed by: NURSE PRACTITIONER

## 2022-11-30 PROCEDURE — 99999 PR PBB SHADOW E&M-EST. PATIENT-LVL IV: CPT | Mod: PBBFAC,,, | Performed by: NURSE PRACTITIONER

## 2022-11-30 PROCEDURE — 99214 OFFICE O/P EST MOD 30 MIN: CPT | Mod: S$GLB,,, | Performed by: NURSE PRACTITIONER

## 2022-11-30 PROCEDURE — 99214 PR OFFICE/OUTPT VISIT, EST, LEVL IV, 30-39 MIN: ICD-10-PCS | Mod: S$GLB,,, | Performed by: NURSE PRACTITIONER

## 2022-11-30 PROCEDURE — 1159F MED LIST DOCD IN RCRD: CPT | Mod: CPTII,S$GLB,, | Performed by: NURSE PRACTITIONER

## 2022-11-30 PROCEDURE — 1160F RVW MEDS BY RX/DR IN RCRD: CPT | Mod: CPTII,S$GLB,, | Performed by: NURSE PRACTITIONER

## 2022-11-30 PROCEDURE — 1160F PR REVIEW ALL MEDS BY PRESCRIBER/CLIN PHARMACIST DOCUMENTED: ICD-10-PCS | Mod: CPTII,S$GLB,, | Performed by: NURSE PRACTITIONER

## 2022-11-30 PROCEDURE — 99999 PR PBB SHADOW E&M-EST. PATIENT-LVL IV: ICD-10-PCS | Mod: PBBFAC,,, | Performed by: NURSE PRACTITIONER

## 2022-11-30 PROCEDURE — 1159F PR MEDICATION LIST DOCUMENTED IN MEDICAL RECORD: ICD-10-PCS | Mod: CPTII,S$GLB,, | Performed by: NURSE PRACTITIONER

## 2022-11-30 PROCEDURE — 1101F PT FALLS ASSESS-DOCD LE1/YR: CPT | Mod: CPTII,S$GLB,, | Performed by: NURSE PRACTITIONER

## 2022-11-30 PROCEDURE — 3044F HG A1C LEVEL LT 7.0%: CPT | Mod: CPTII,S$GLB,, | Performed by: NURSE PRACTITIONER

## 2022-11-30 PROCEDURE — 3008F PR BODY MASS INDEX (BMI) DOCUMENTED: ICD-10-PCS | Mod: CPTII,S$GLB,, | Performed by: NURSE PRACTITIONER

## 2022-11-30 PROCEDURE — 3044F PR MOST RECENT HEMOGLOBIN A1C LEVEL <7.0%: ICD-10-PCS | Mod: CPTII,S$GLB,, | Performed by: NURSE PRACTITIONER

## 2022-11-30 PROCEDURE — 1125F PR PAIN SEVERITY QUANTIFIED, PAIN PRESENT: ICD-10-PCS | Mod: CPTII,S$GLB,, | Performed by: NURSE PRACTITIONER

## 2022-11-30 PROCEDURE — 3066F NEPHROPATHY DOC TX: CPT | Mod: CPTII,S$GLB,, | Performed by: NURSE PRACTITIONER

## 2022-11-30 NOTE — PROGRESS NOTES
SUBJECTIVE:     Chief Complaint & History of Present Illness:  Ibrahima Phelps is a New 67 y.o. year old male patient here for intermittent bilateral foot/ankle pain which has been present for 1 week.  There is a history of injury.  He reports he fell from a sitting position to the floor and hurt his left pinky toe.  He reports he had part of his bone removed in his left foot years ago due to callus formation.  On the right, he reports intermittent shooting pain through his ankle to his foot.  Additionally he states his right foot is slipping when walking.  He denies any foot drop.  He states this slapping seems to come and go.  States the pain wakes him up at night.  He is currently on Neurontin but was told by his PCP not to use the medication.  He is also a kidney transplant patient currently on immunosuppressants followed by the kidney transplant team.    Patient states he comes in today hoping that x-rays which showed that he did not fracture anything in either foot.    Chart review he was seen by Podiatry back in July for suspected gout in the right ankle.    Review of patient's allergies indicates:   Allergen Reactions    Ace inhibitors Swelling    Verapamil Other (See Comments)     Other reaction(s): Unknown    Povidone-iodine Itching         Current Outpatient Medications   Medication Sig Dispense Refill    albuterol (VENTOLIN HFA) 90 mcg/actuation inhaler Inhale 2 puffs into the lungs every 6 (six) hours as needed for Wheezing or Shortness of Breath. Rescue 18 g 3    ammonium lactate 12 % Crea Apply 1 g topically once daily. 140 g 1    aspirin (ECOTRIN) 81 MG EC tablet Take 1 tablet by mouth Daily.      atorvastatin (LIPITOR) 20 MG tablet Take 1 tablet (20 mg total) by mouth once daily. 90 tablet 3    calcium carbonate (OS-TRISH) 500 mg calcium (1,250 mg) tablet TAKE 2 TABLETS BY MOUTH TWICE A  tablet 3    clonazePAM (KLONOPIN) 0.5 MG tablet Take 1 tablet (0.5 mg total) by mouth 2 (two) times  daily as needed for Anxiety. 60 tablet 0    colchicine (MITIGARE) 0.6 mg Cap One po BID prn gout flare up to 3 days 15 capsule 11    dicyclomine (BENTYL) 20 mg tablet Take 1 tablet (20 mg total) by mouth 2 (two) times daily. 20 tablet 0    doxazosin (CARDURA) 4 MG tablet TAKE 1 TABLET (4 MG TOTAL) BY MOUTH EVERY 12 (TWELVE) HOURS. 180 tablet 3    ELIQUIS 5 mg Tab TAKE 1 TABLET BY MOUTH TWICE A  tablet 3    famotidine (PEPCID) 20 MG tablet Take 1 tablet (20 mg total) by mouth 2 (two) times daily. 180 tablet 3    fexofenadine (ALLEGRA) 60 MG tablet Take 1 tablet by mouth Twice daily as needed.      flecainide (TAMBOCOR) 50 MG Tab Take 1 tablet (50 mg total) by mouth every 12 (twelve) hours. 180 tablet 3    fluticasone propion-salmeterol 115-21 mcg/dose (ADVAIR HFA) 115-21 mcg/actuation HFAA inhaler Inhale 2 puffs into the lungs every 12 (twelve) hours. Controller 12 g 5    fluticasone propionate (FLONASE) 50 mcg/actuation nasal spray 1 spray (50 mcg total) by Each Nostril route once daily. 18.2 mL 0    furosemide (LASIX) 40 MG tablet Take 1 tablet (40 mg total) by mouth 2 (two) times daily. 10 tablet 0    gabapentin (NEURONTIN) 300 MG capsule TAKE 1 CAPSULE BY MOUTH TWICE A  capsule 2    hydrALAZINE (APRESOLINE) 50 MG tablet TAKE 1 TABLET (50 MG TOTAL) BY MOUTH 3 (THREE) TIMES DAILY. 270 tablet 3    KLOR-CON 10 10 mEq TbSR TAKE 1 TABLET BY MOUTH EVERY DAY 90 tablet 4    metoprolol succinate (TOPROL-XL) 25 MG 24 hr tablet Take 1 tablet (25 mg total) by mouth once daily. 90 tablet 3    multivitamin (THERAGRAN) per tablet Take 1 tablet by mouth Daily.      mycophenolate (CELLCEPT) 250 mg Cap Take 2 capsules (500 mg total) by mouth 2 (two) times daily. 360 capsule 3    NIFEdipine (PROCARDIA-XL) 60 MG (OSM) 24 hr tablet Take 1 tablet (60 mg total) by mouth 2 (two) times a day. 14 tablet 0    omeprazole (PRILOSEC) 20 MG capsule Take 1 capsule (20 mg total) by mouth once daily. 14 capsule 0    paricalcitoL  (ZEMPLAR) 1 MCG capsule TAKE 1 TABLET BY MOUTH ON MONDAY, WEDNESDAY, & FRIDAY 36 capsule 3    predniSONE (DELTASONE) 5 MG tablet TAKE 1 TABLET BY MOUTH EVERY DAY IN THE MORNING 90 tablet 3    spironolactone (ALDACTONE) 25 MG tablet TAKE 1 TABLET EVERY DAY 90 tablet 3    sucralfate (CARAFATE) 1 gram tablet Take 1 tablet (1 g total) by mouth 4 (four) times daily as needed (abdominal pain). 20 tablet 0    tacrolimus (PROGRAF) 0.5 MG Cap TAKE 1 CAPSULE BY MOUTH EVERY EVENING. (TAKE WITH 2 1MG CAPSULES) 90 capsule 3    tacrolimus (PROGRAF) 1 MG Cap Two po in am and two po in pm 270 capsule 3    vitamin D 1000 units Tab Take 370 mg by mouth 2 (two) times daily. 2 tablets BID       No current facility-administered medications for this visit.       Past Medical History:   Diagnosis Date    (HFpEF) heart failure with preserved ejection fraction 9/25/2017    Atrial fibrillation     CAD (coronary artery disease)     CHF (congestive heart failure)     Chronic diastolic heart failure 4/8/2020    CKD (chronic kidney disease) stage 3, GFR 30-59 ml/min     Dr. Latif    CKD (chronic kidney disease) stage 3, GFR 30-59 ml/min     Diverticulosis     Fever blister     Heart attack 2006    Hyperlipidemia     Hypertension     Immunodeficiency due to treatment with immunosuppressive medication     Keloid cicatrix     Left lower quadrant abdominal pain 6/5/2022    Metabolic bone disease     Pericarditis     S/P kidney transplant     Supraventricular tachycardia 06/2019    Thrombocytopenia     Thyroid disease     Tobacco use 9/5/2014    Unspecified disorder of kidney and ureter     Stage IIICKD       Past Surgical History:   Procedure Laterality Date    CARDIOVERSION  04/30/15    CHOLECYSTECTOMY      COLONOSCOPY  April 20, 2011    Diverticulosis, repeat recommended in 3 yrs., repeat colonoscopy 2014 revealed 2 polyps.  He should return in 5 years.    COLONOSCOPY N/A 3/13/2020    Procedure: COLONOSCOPY;  Surgeon: Chon Casper MD;   "[facility]  Service: Endoscopy;  Laterality: N/A;  ok to hold coumadin x5days-see telephone encounter 2/4/20-tb    COLONOSCOPY N/A 2/4/2021    Procedure: COLONOSCOPY;  Surgeon: Chon Casper MD;  [facility]  Service: Endoscopy;  Laterality: N/A;  Eliquis - per Dr. GAVIN Blunt ok to hold x 2 days and "restarted asap"- ERW  last prep "poor", [id number] ordered/ Pt requests Dr. Casper  prep ins. mailed - COVID screening on 2/1/21 PCW- ERW    COLONOSCOPY N/A 3/3/2021    Procedure: COLONOSCOPY;  Surgeon: Chon Casper MD;  Location: [facility] ([address] [place] [address]);  Service: Endoscopy;  Laterality: N/A;  Patient with his second poor bowel pre and has poor prep today.  If patient not intersted or can't get colonoscopy tomorrow will need constipation bowel prep and will need to restart his Eliquis today.Thanks,Chon     per Dr Blunt-ok to hold Eliquis 2 days prior      COVID     CORONARY ANGIOPLASTY WITH STENT PLACEMENT  9/13/2006    KIDNEY TRANSPLANT  2005    PARATHYROIDECTOMY      TREATMENT OF CARDIAC ARRHYTHMIA N/A 3/28/2022    Procedure: CARDIOVERSION;  Surgeon: Migue Blunt MD;  Location: [facility];  Service: Cardiology;  Laterality: N/A;  AF, DCC, MAC, GP, 3 PREP*RODRIGO deferred pt 100% compliant*       Family History   Problem Relation Age of Onset    No Known Problems Sister     No Known Problems Brother     Thyroid disease Mother         s/p surgery    Heart disease Father         had pacemaker    No Known Problems Sister     Kidney failure Sister     Kidney disease Sister     No Known Problems Sister     Kidney disease Brother     Kidney failure Brother         s/p transplant    Diabetes Mellitus Neg Hx     Stroke Neg Hx     Heart attack Neg Hx     Cancer Neg Hx     Celiac disease Neg Hx     Cirrhosis Neg Hx     Colon cancer Neg Hx     Esophageal cancer Neg Hx     Inflammatory bowel disease Neg Hx     Rectal cancer Neg Hx     Stomach cancer Neg Hx     Ulcerative colitis Neg Hx     " "Liver disease Neg Hx     Liver cancer Neg Hx     Crohn's disease Neg Hx     Melanoma Neg Hx        Review of Systems:  ROS:  Constitutional: no fever or chills  Eyes: no visual changes  ENT: no nasal congestion or sore throat  Respiratory: no cough or shortness of breath  Cardiovascular: no chest pain or palpitations  Gastrointestinal: no nausea or vomiting, tolerating diet  Genitourinary: no hematuria or dysuria  Integument/Breast: no rash or pruritis  Hematologic/Lymphatic: no easy bruising or lymphadenopathy  Musculoskeletal:  Bilateral feet pain  Neurological: no seizures or tremors  Behavioral/Psych: no auditory or visual hallucinations  Endocrine: no heat or cold intolerance      OBJECTIVE:     PHYSICAL EXAM:  Vital Signs (Most Recent)  There were no vitals filed for this visit.  Height: 6' 1" (185.4 cm) Weight: 106.6 kg (235 lb 0.2 oz),   Estimated body mass index is 31.01 kg/m² as calculated from the following:    Height as of this encounter: 6' 1" (1.854 m).    Weight as of this encounter: 106.6 kg (235 lb 0.2 oz).   General Appearance: Well nourished, well developed, in no acute distress.  HENT: Normal cephalic, oropharynx pink and moist  Eyes: PERRLA bilaterally and EOM x 4  Respiratory: Even and unlabored  Skin: Warm and Dry.   Psychiatric: AAO x 4, Mood & affect are normal.    bilateral  Foot/Ankle    General appearance: no acute distress, alert/oriented x3, appropriate mood and affect, looks stated age, and well nourished  The examination was performed out of splint/cast  Skin: normal  Swelling: none  Warmth: no warmth  Tenderness: diffuse  ROM: normal  Strength: normal  Gait: normal  Stability: stable to testing and Cotton test: negative  Crepitus: no  Neurological Exam: normal  Vascular Exam: normal and pulse present    normal exam, no swelling, tenderness, instability; ligaments intact, full range of motion of all ankle/foot joints    On the right patient is able to curl and flex his toes.  " Bilateral feet show normal range of motion in dorsiflexion plantar flexion and with internal and external rotation.    RADIOGRAPHS:  X-ray of his bilateral feet were obtained, findings show no acute fractures.  On the left there is a partial osteotomy at the 5th proximal metatarsal.  He has calcaneus spurs on both feet with left foot being worse than right.  And he has flatfoot deformity.  All radiographs were personally reviewed by me.    ASSESSMENT/PLAN:       ICD-10-CM ICD-9-CM   1. Foot pain, bilateral  M79.671 729.5    M79.672        Plan:  -Ibrahima Phelps presents to clinic today with c/c bilaterally foot/ankle pain for the past week.  -X-ray as above.  -Recommend RICE therapy.  -I recommend he continue to weight bear as tolerated range of motion as tolerated.  I discussed the x-ray findings with the patient.  Given his shooting pain in his right foot this appears to be neuropathic.  He is currently prescribed gabapentin 300 mg twice a day.  He can discuss with his PCP or the transplant nephrologist regarding additional treatment plans or he can follow-up with podiatrist.  I do not believe he has a drop foot on the right as he has normal range of motion on the right.  -Follow up PRN.

## 2022-12-12 ENCOUNTER — OFFICE VISIT (OUTPATIENT)
Dept: INTERNAL MEDICINE | Facility: CLINIC | Age: 67
End: 2022-12-12
Payer: MEDICARE

## 2022-12-12 VITALS
DIASTOLIC BLOOD PRESSURE: 60 MMHG | SYSTOLIC BLOOD PRESSURE: 130 MMHG | HEIGHT: 73 IN | HEART RATE: 69 BPM | BODY MASS INDEX: 30.21 KG/M2 | WEIGHT: 227.94 LBS | OXYGEN SATURATION: 98 %

## 2022-12-12 DIAGNOSIS — E78.2 MIXED HYPERLIPIDEMIA: ICD-10-CM

## 2022-12-12 DIAGNOSIS — I10 PRIMARY HYPERTENSION: ICD-10-CM

## 2022-12-12 DIAGNOSIS — N25.81 SECONDARY RENAL HYPERPARATHYROIDISM: ICD-10-CM

## 2022-12-12 DIAGNOSIS — Z94.0 IMMUNOSUPPRESSIVE MANAGEMENT ENCOUNTER FOLLOWING KIDNEY TRANSPLANT: ICD-10-CM

## 2022-12-12 DIAGNOSIS — Z12.5 SCREENING PSA (PROSTATE SPECIFIC ANTIGEN): ICD-10-CM

## 2022-12-12 DIAGNOSIS — D84.821 IMMUNODEFICIENCY DUE TO TREATMENT WITH IMMUNOSUPPRESSIVE MEDICATION: ICD-10-CM

## 2022-12-12 DIAGNOSIS — R91.8 MULTIPLE LUNG NODULES ON CT: ICD-10-CM

## 2022-12-12 DIAGNOSIS — Z94.0 KIDNEY REPLACED BY TRANSPLANT: ICD-10-CM

## 2022-12-12 DIAGNOSIS — Z94.0 H/O KIDNEY TRANSPLANT: ICD-10-CM

## 2022-12-12 DIAGNOSIS — N18.4 ANEMIA DUE TO STAGE 4 CHRONIC KIDNEY DISEASE: ICD-10-CM

## 2022-12-12 DIAGNOSIS — I70.0 AORTIC ATHEROSCLEROSIS: ICD-10-CM

## 2022-12-12 DIAGNOSIS — I25.10 CORONARY ARTERY DISEASE INVOLVING NATIVE CORONARY ARTERY OF NATIVE HEART WITHOUT ANGINA PECTORIS: ICD-10-CM

## 2022-12-12 DIAGNOSIS — R41.3 MEMORY CHANGES: ICD-10-CM

## 2022-12-12 DIAGNOSIS — J43.8 OTHER EMPHYSEMA: ICD-10-CM

## 2022-12-12 DIAGNOSIS — Z79.899 IMMUNODEFICIENCY DUE TO TREATMENT WITH IMMUNOSUPPRESSIVE MEDICATION: ICD-10-CM

## 2022-12-12 DIAGNOSIS — I13.0 HYPERTENSIVE HEART AND RENAL DISEASE WITH RENAL FAILURE, STAGE 1 THROUGH STAGE 4 OR UNSPECIFIED CHRONIC KIDNEY DISEASE, WITH HEART FAILURE: ICD-10-CM

## 2022-12-12 DIAGNOSIS — Z87.891 FORMER SMOKER: ICD-10-CM

## 2022-12-12 DIAGNOSIS — Z79.01 LONG TERM (CURRENT) USE OF ANTICOAGULANTS: ICD-10-CM

## 2022-12-12 DIAGNOSIS — Z79.899 IMMUNOSUPPRESSIVE MANAGEMENT ENCOUNTER FOLLOWING KIDNEY TRANSPLANT: ICD-10-CM

## 2022-12-12 DIAGNOSIS — N18.4 CKD (CHRONIC KIDNEY DISEASE) STAGE 4, GFR 15-29 ML/MIN: ICD-10-CM

## 2022-12-12 DIAGNOSIS — I48.0 PAROXYSMAL ATRIAL FIBRILLATION: ICD-10-CM

## 2022-12-12 DIAGNOSIS — G25.2 POSTURAL TREMOR: ICD-10-CM

## 2022-12-12 DIAGNOSIS — M10.321 ACUTE GOUT DUE TO RENAL IMPAIRMENT INVOLVING RIGHT ELBOW: ICD-10-CM

## 2022-12-12 DIAGNOSIS — R73.03 PREDIABETES: ICD-10-CM

## 2022-12-12 DIAGNOSIS — I65.23 BILATERAL CAROTID ARTERY STENOSIS: ICD-10-CM

## 2022-12-12 DIAGNOSIS — R06.09 DYSPNEA ON EXERTION: Primary | ICD-10-CM

## 2022-12-12 DIAGNOSIS — I50.32 CHRONIC DIASTOLIC HEART FAILURE: ICD-10-CM

## 2022-12-12 DIAGNOSIS — D63.1 ANEMIA DUE TO STAGE 4 CHRONIC KIDNEY DISEASE: ICD-10-CM

## 2022-12-12 PROCEDURE — 99214 PR OFFICE/OUTPT VISIT, EST, LEVL IV, 30-39 MIN: ICD-10-PCS | Mod: S$GLB,,, | Performed by: INTERNAL MEDICINE

## 2022-12-12 PROCEDURE — 3078F PR MOST RECENT DIASTOLIC BLOOD PRESSURE < 80 MM HG: ICD-10-PCS | Mod: CPTII,S$GLB,, | Performed by: INTERNAL MEDICINE

## 2022-12-12 PROCEDURE — 99999 PR PBB SHADOW E&M-EST. PATIENT-LVL V: CPT | Mod: PBBFAC,,, | Performed by: INTERNAL MEDICINE

## 2022-12-12 PROCEDURE — 99214 OFFICE O/P EST MOD 30 MIN: CPT | Mod: S$GLB,,, | Performed by: INTERNAL MEDICINE

## 2022-12-12 PROCEDURE — 1126F PR PAIN SEVERITY QUANTIFIED, NO PAIN PRESENT: ICD-10-PCS | Mod: CPTII,S$GLB,, | Performed by: INTERNAL MEDICINE

## 2022-12-12 PROCEDURE — 1159F MED LIST DOCD IN RCRD: CPT | Mod: CPTII,S$GLB,, | Performed by: INTERNAL MEDICINE

## 2022-12-12 PROCEDURE — 99999 PR PBB SHADOW E&M-EST. PATIENT-LVL V: ICD-10-PCS | Mod: PBBFAC,,, | Performed by: INTERNAL MEDICINE

## 2022-12-12 PROCEDURE — 1160F PR REVIEW ALL MEDS BY PRESCRIBER/CLIN PHARMACIST DOCUMENTED: ICD-10-PCS | Mod: CPTII,S$GLB,, | Performed by: INTERNAL MEDICINE

## 2022-12-12 PROCEDURE — 3288F FALL RISK ASSESSMENT DOCD: CPT | Mod: CPTII,S$GLB,, | Performed by: INTERNAL MEDICINE

## 2022-12-12 PROCEDURE — 3288F PR FALLS RISK ASSESSMENT DOCUMENTED: ICD-10-PCS | Mod: CPTII,S$GLB,, | Performed by: INTERNAL MEDICINE

## 2022-12-12 PROCEDURE — 3075F SYST BP GE 130 - 139MM HG: CPT | Mod: CPTII,S$GLB,, | Performed by: INTERNAL MEDICINE

## 2022-12-12 PROCEDURE — 1126F AMNT PAIN NOTED NONE PRSNT: CPT | Mod: CPTII,S$GLB,, | Performed by: INTERNAL MEDICINE

## 2022-12-12 PROCEDURE — 1160F RVW MEDS BY RX/DR IN RCRD: CPT | Mod: CPTII,S$GLB,, | Performed by: INTERNAL MEDICINE

## 2022-12-12 PROCEDURE — 1101F PT FALLS ASSESS-DOCD LE1/YR: CPT | Mod: CPTII,S$GLB,, | Performed by: INTERNAL MEDICINE

## 2022-12-12 PROCEDURE — 3075F PR MOST RECENT SYSTOLIC BLOOD PRESS GE 130-139MM HG: ICD-10-PCS | Mod: CPTII,S$GLB,, | Performed by: INTERNAL MEDICINE

## 2022-12-12 PROCEDURE — 3008F PR BODY MASS INDEX (BMI) DOCUMENTED: ICD-10-PCS | Mod: CPTII,S$GLB,, | Performed by: INTERNAL MEDICINE

## 2022-12-12 PROCEDURE — 3078F DIAST BP <80 MM HG: CPT | Mod: CPTII,S$GLB,, | Performed by: INTERNAL MEDICINE

## 2022-12-12 PROCEDURE — 3008F BODY MASS INDEX DOCD: CPT | Mod: CPTII,S$GLB,, | Performed by: INTERNAL MEDICINE

## 2022-12-12 PROCEDURE — 1101F PR PT FALLS ASSESS DOC 0-1 FALLS W/OUT INJ PAST YR: ICD-10-PCS | Mod: CPTII,S$GLB,, | Performed by: INTERNAL MEDICINE

## 2022-12-12 PROCEDURE — 1159F PR MEDICATION LIST DOCUMENTED IN MEDICAL RECORD: ICD-10-PCS | Mod: CPTII,S$GLB,, | Performed by: INTERNAL MEDICINE

## 2022-12-12 RX ORDER — PREDNISONE 20 MG/1
40 TABLET ORAL DAILY
Qty: 10 TABLET | Refills: 0 | Status: SHIPPED | OUTPATIENT
Start: 2022-12-12 | End: 2022-12-17

## 2022-12-12 NOTE — PROGRESS NOTES
"INTERNAL MEDICINE ESTABLISHED PATIENT VISIT NOTE    Subjective:     Chief Complaint: Gout, Shortness of Breath, and Abdominal Pain       Patient ID: Ibrahima Phelps is a 67 y.o. male with HTN c CKD3 and hx renal transplant x2 in 2002 and 2005 and on immunosuppressive meds and secondary hyperPTH, HLD, A fib, hx SVT, CAD c chronic HF c preserved EF, COPD c baseline DAUGHERTY and tob use, thrombocytopenia now resolved, anemia of chronic disease, preDM, GERD, lung nodule, last seen by me 8/2022, here today for f/u.    Has had issues c chronic lightheadedness and DAUGHERTY since prior to last visit in Aug.  At that time, Cardiology had decreased his Flecainide and Toprol but sx had persisted.  States he was unhappy with his last visit c Dr. Nick Blunt and wanted to est c a new provider.    Was also referred to pulm for DAUGHERTY given smoking hx.  Of note, PFTs c normal spirometry.  CT chest 2/2022 c lung opacities c rec to repeat in a year.  Was seen by pulm who added Advair which pt only took for two weeks and stopped meds since he did not feel like they helped.    Still c DAUGHERTY and intermittent lightheadedness.    Had ED visit in Nov x2 for abd pain and cramping c diarrhea.  Had CT abd/pelvis which was unremarkable.  States diarrhea resolved but still gets occasional cramping.  Of note, csc utd c repeat due in March.  Has appt c GI pending for Jan.    No recent issues c LE edema.    Today also c/o recurrent gout in his R elbow.  Took a Colchicine last night which he thinks helped a little.  Did not take anything this morning and states elbow feels very tender to touch.    Was also seen by Neuro for a tremor and memory issues and was Klonopin which pt states "made him feel like a zombie" and states he no longer wishes to take it.    Past Medical History:  Past Medical History:   Diagnosis Date    (HFpEF) heart failure with preserved ejection fraction 9/25/2017    Atrial fibrillation     CAD (coronary artery disease)     CHF (congestive " heart failure)     Chronic diastolic heart failure 4/8/2020    CKD (chronic kidney disease) stage 3, GFR 30-59 ml/min     Dr. Latif    CKD (chronic kidney disease) stage 3, GFR 30-59 ml/min     Diverticulosis     Fever blister     Heart attack 2006    Hyperlipidemia     Hypertension     Immunodeficiency due to treatment with immunosuppressive medication     Keloid cicatrix     Left lower quadrant abdominal pain 6/5/2022    Metabolic bone disease     Pericarditis     S/P kidney transplant     Supraventricular tachycardia 06/2019    Thrombocytopenia     Thyroid disease     Tobacco use 9/5/2014    Unspecified disorder of kidney and ureter     Stage IIICKD       Home Medications:  Prior to Admission medications    Medication Sig Start Date End Date Taking? Authorizing Provider   albuterol (VENTOLIN HFA) 90 mcg/actuation inhaler Inhale 2 puffs into the lungs every 6 (six) hours as needed for Wheezing or Shortness of Breath. Rescue 4/18/22   Connie Magdaleno MD   ammonium lactate 12 % Crea Apply 1 g topically once daily. 2/1/22   Denia Kauffman DPM   aspirin (ECOTRIN) 81 MG EC tablet Take 1 tablet by mouth Daily. 6/11/12   Historical Provider   atorvastatin (LIPITOR) 20 MG tablet Take 1 tablet (20 mg total) by mouth once daily. 3/8/22   Shagufta Blunt MD   calcium carbonate (OS-TRISH) 500 mg calcium (1,250 mg) tablet TAKE 2 TABLETS BY MOUTH TWICE A DAY 10/17/22   Emre Latif MD   clonazePAM (KLONOPIN) 0.5 MG tablet Take 1 tablet (0.5 mg total) by mouth 2 (two) times daily as needed for Anxiety. 10/31/22 10/31/23  Darien Bernard MD   colchicine (MITIGARE) 0.6 mg Cap One po BID prn gout flare up to 3 days 6/27/22   Emre Latif MD   doxazosin (CARDURA) 4 MG tablet TAKE 1 TABLET (4 MG TOTAL) BY MOUTH EVERY 12 (TWELVE) HOURS. 4/8/22   Emre Latif MD   ELIQUIS 5 mg Tab TAKE 1 TABLET BY MOUTH TWICE A DAY 10/10/22   James Huerta III, MD   famotidine (PEPCID) 20 MG tablet Take 1 tablet (20 mg total)  by mouth 2 (two) times daily. 2/24/22   Emre Latif MD   fexofenadine (ALLEGRA) 60 MG tablet Take 1 tablet by mouth Twice daily as needed. 6/11/12   Historical Provider   flecainide (TAMBOCOR) 50 MG Tab Take 1 tablet (50 mg total) by mouth every 12 (twelve) hours. 6/13/22 6/13/23  Migue Blunt MD   fluticasone propion-salmeterol 115-21 mcg/dose (ADVAIR HFA) 115-21 mcg/actuation HFAA inhaler Inhale 2 puffs into the lungs every 12 (twelve) hours. Controller 9/30/22 9/30/23  Sean Hdez MD   fluticasone propionate (FLONASE) 50 mcg/actuation nasal spray 1 spray (50 mcg total) by Each Nostril route once daily. 7/26/19   Trish Worthy PA-C   furosemide (LASIX) 40 MG tablet Take 1 tablet (40 mg total) by mouth 2 (two) times daily. 7/30/22   Gilda Gilbert MD   gabapentin (NEURONTIN) 300 MG capsule TAKE 1 CAPSULE BY MOUTH TWICE A DAY 10/11/22   Connie Magdaleno MD   hydrALAZINE (APRESOLINE) 50 MG tablet TAKE 1 TABLET (50 MG TOTAL) BY MOUTH 3 (THREE) TIMES DAILY. 10/24/22   Emre Latif MD   KLOR-CON 10 10 mEq TbSR TAKE 1 TABLET BY MOUTH EVERY DAY 10/11/22   Alberta Loredo NP   metoprolol succinate (TOPROL-XL) 25 MG 24 hr tablet Take 1 tablet (25 mg total) by mouth once daily. 8/10/22 8/10/23  Connie Magdaleno MD   multivitamin (THERAGRAN) per tablet Take 1 tablet by mouth Daily. 6/11/12   Historical Provider   mycophenolate (CELLCEPT) 250 mg Cap Take 2 capsules (500 mg total) by mouth 2 (two) times daily. 7/21/22 7/16/23  Emre Latif MD   NIFEdipine (PROCARDIA-XL) 60 MG (OSM) 24 hr tablet Take 1 tablet (60 mg total) by mouth 2 (two) times a day. 7/21/22 7/21/23  Connie Magdaleno MD   omeprazole (PRILOSEC) 20 MG capsule Take 1 capsule (20 mg total) by mouth once daily. 11/6/22 11/6/23  Nora Hawkins PA-C   paricalcitoL (ZEMPLAR) 1 MCG capsule TAKE 1 TABLET BY MOUTH ON MONDAY, WEDNESDAY, & FRIDAY 2/24/22   Emre Latif MD   predniSONE (DELTASONE) 5 MG tablet TAKE 1 TABLET BY MOUTH  EVERY DAY IN THE MORNING 22   Emre Latif MD   spironolactone (ALDACTONE) 25 MG tablet TAKE 1 TABLET EVERY DAY 22   Alberta Loredo NP   sucralfate (CARAFATE) 1 gram tablet Take 1 tablet (1 g total) by mouth 4 (four) times daily as needed (abdominal pain). 22   Nora Hawkins PA-C   tacrolimus (PROGRAF) 0.5 MG Cap TAKE 1 CAPSULE BY MOUTH EVERY EVENING. (TAKE WITH 2 1MG CAPSULES) 10/7/22   Emre Latif MD   tacrolimus (PROGRAF) 1 MG Cap Two po in am and two po in pm 10/14/22   Emre Latif MD   vitamin D 1000 units Tab Take 370 mg by mouth 2 (two) times daily. 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     Patient is currently retired   Substance Use Topics    Alcohol use: No    Drug use: No        Review of Systems   Constitutional:  Negative for chills, fatigue and fever.   Respiratory:  Positive for shortness of breath. Negative for cough and chest tightness.    Cardiovascular:  Negative for chest pain.   Gastrointestinal:  Negative for abdominal pain and blood in stool.   Genitourinary:  Negative for dysuria and frequency.   Musculoskeletal:  Positive for arthralgias and joint swelling.   Neurological:  Positive for light-headedness.       Health Maintenance:     Immunizations:   Influenza 10/2022  TDap is up to date 3/2018  Pneumovax 2016, Prevnar 13 2017, pvax booster 2020  Shingrix 10/8/2020, 2020  COVID vaccine 21  "Pfizer, 3/5/21, 8/2021, 4/11/22, 10/2022     Cancer Screening:  Colonoscopy: is up to date. 9/2014, polyps, hyperplastic and tubular adenoma, rec f/u in 5 yrs, most recent csc done 3/2021 c area of patchy erythema, neg for colitis or dysplasia, f/u 2 yrs per report (3/2023)  PSA 12/2020 wnl, can order repeat  AAA u/s 12/2020 no AAA    Objective:   /60 (BP Location: Left arm, Patient Position: Sitting, BP Method: Medium (Manual))   Pulse 69   Ht 6' 1" (1.854 m)   Wt 103.4 kg (227 lb 15.3 oz)   SpO2 98%   BMI 30.08 kg/m²        General: AAO x3, no apparent distress  HEENT: PERRL, OP clear  CV: RRR, no m/r/g  Pulm: Lungs CTAB, no crackles, no wheezes  Abd: s/NT/ND +BS  Extremities: no c/c/e on LE, R elbow ttp and slightly warm to touch, minimal swelling, no erythema.    Labs:     Lab Results   Component Value Date    WBC 9.81 11/06/2022    HGB 11.5 (L) 11/06/2022    HCT 31 (L) 12/25/2022    MCV 81 (L) 11/06/2022     11/06/2022     Lab Results   Component Value Date    IRON 62 06/17/2020    TRANSFERRIN 173 (L) 06/17/2020    TIBC 256 06/17/2020    FESATURATED 24 06/17/2020      Lab Results   Component Value Date    FERRITIN 777 (H) 06/17/2020       Sodium   Date Value Ref Range Status   11/06/2022 143 136 - 145 mmol/L Final     Potassium   Date Value Ref Range Status   11/06/2022 4.4 3.5 - 5.1 mmol/L Final     Chloride   Date Value Ref Range Status   11/06/2022 107 95 - 110 mmol/L Final     CO2   Date Value Ref Range Status   11/06/2022 21 (L) 23 - 29 mmol/L Final     Glucose   Date Value Ref Range Status   11/06/2022 100 70 - 110 mg/dL Final     BUN   Date Value Ref Range Status   11/06/2022 28 (H) 8 - 23 mg/dL Final     Creatinine   Date Value Ref Range Status   11/06/2022 2.5 (H) 0.5 - 1.4 mg/dL Final     Calcium   Date Value Ref Range Status   11/06/2022 7.3 (L) 8.7 - 10.5 mg/dL Final     Total Protein   Date Value Ref Range Status   11/06/2022 6.5 6.0 - 8.4 g/dL Final     Albumin   Date Value Ref " Range Status   11/06/2022 3.0 (L) 3.5 - 5.2 g/dL Final     Total Bilirubin   Date Value Ref Range Status   11/06/2022 0.3 0.1 - 1.0 mg/dL Final     Comment:     For infants and newborns, interpretation of results should be based  on gestational age, weight and in agreement with clinical  observations.    Premature Infant recommended reference ranges:  Up to 24 hours.............<8.0 mg/dL  Up to 48 hours............<12.0 mg/dL  3-5 days..................<15.0 mg/dL  6-29 days.................<15.0 mg/dL       Alkaline Phosphatase   Date Value Ref Range Status   11/06/2022 63 55 - 135 U/L Final     AST   Date Value Ref Range Status   11/06/2022 26 10 - 40 U/L Final     Comment:     *Result may be interfered by visible hemolysis     ALT   Date Value Ref Range Status   11/06/2022 20 10 - 44 U/L Final     Anion Gap   Date Value Ref Range Status   11/06/2022 15 8 - 16 mmol/L Final     eGFR if    Date Value Ref Range Status   07/30/2022 25.8 (A) >60 mL/min/1.73 m^2 Final     eGFR if non    Date Value Ref Range Status   07/30/2022 22.3 (A) >60 mL/min/1.73 m^2 Final     Comment:     Calculation used to obtain the estimated glomerular filtration  rate (eGFR) is the CKD-EPI equation.        Lab Results   Component Value Date    HGBA1C 5.4 09/08/2022     Lab Results   Component Value Date    LDLCALC 70.2 03/08/2022     Lab Results   Component Value Date    TSH 1.212 08/10/2022         Assessment/Plan     Ibrahima was seen today for gout, shortness of breath and abdominal pain.    Diagnoses and all orders for this visit:    Dyspnea on exertion  As per HPI  Likely multifactorial  Unsure if related to possible cardiac etiology vs med s/e.  Rec f/u c Cardiology and EP  Seen by Pulm for similar sx in Sept who noted COPD could also be contributing but did not have improvement in sx c additional of Advair.  Last PFTs normal.  Can f/u c Pulm as well.  Next CT lung due in Feb for f/u of mult lung  nodules.  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    Acute gout due to renal impairment involving right elbow  Will avoid NSAIDS due to CKD issues  -     predniSONE (DELTASONE) 20 MG tablet; Take 2 tablets (40 mg total) by mouth once daily. for 5 days    Prediabetes  Lab Results   Component Value Date    HGBA1C 5.4 09/08/2022     Normal on recent labs.  Can repeat annually  -     Microalbumin/Creatinine Ratio, Urine; Future    Mixed hyperlipidemia  Lab Results   Component Value Date    LDLCALC 70.2 03/08/2022     at goal.  Cont current medications.    Postural tremor  Memory changes  Seen by Neuro as per HPI  Did not tolerate s/e of Klonopin okay to hold    Multiple lung nodules on CT  Former smoker  Plan for f/u CT in Feb.    Bilateral carotid artery stenosis  Last carotid u/s in March c less than 50% stenosis B.  Cont bp and lipid control    Aortic atherosclerosis  Bp and lipid control as above    Kidney replaced by transplant  H/O kidney transplant  Immunosuppressive management encounter following kidney transplant  Immunodeficiency due to treatment with immunosuppressive  medication  Anemia due to stage 4 chronic kidney disease  Secondary renal hyperparathyroidism  Cr stable, cont f/u c transplant and Nephrology  Anemia stable    Screening PSA (prostate specific antigen)  -     PSA, SCREENING; Future    HM as above  RTC in 3 mos, sooner if needed.  Labs scheduled in Jan.    Connie Magdaleno MD  Department of Internal Medicine - [facility]  01/02/2023

## 2022-12-15 ENCOUNTER — OFFICE VISIT (OUTPATIENT)
Dept: ELECTROPHYSIOLOGY | Facility: CLINIC | Age: 67
End: 2022-12-15
Payer: MEDICARE

## 2022-12-15 VITALS
DIASTOLIC BLOOD PRESSURE: 60 MMHG | WEIGHT: 231.25 LBS | HEART RATE: 65 BPM | HEIGHT: 73 IN | BODY MASS INDEX: 30.65 KG/M2 | SYSTOLIC BLOOD PRESSURE: 110 MMHG

## 2022-12-15 DIAGNOSIS — E66.9 OBESITY (BMI 30-39.9): ICD-10-CM

## 2022-12-15 DIAGNOSIS — Z94.0 H/O KIDNEY TRANSPLANT: ICD-10-CM

## 2022-12-15 DIAGNOSIS — Z79.01 LONG TERM (CURRENT) USE OF ANTICOAGULANTS: ICD-10-CM

## 2022-12-15 DIAGNOSIS — E03.9 HYPOTHYROIDISM, UNSPECIFIED TYPE: ICD-10-CM

## 2022-12-15 DIAGNOSIS — Z87.891 HISTORY OF TOBACCO USE: ICD-10-CM

## 2022-12-15 DIAGNOSIS — I70.0 AORTIC ATHEROSCLEROSIS: ICD-10-CM

## 2022-12-15 DIAGNOSIS — Z79.899 IMMUNODEFICIENCY DUE TO TREATMENT WITH IMMUNOSUPPRESSIVE MEDICATION: ICD-10-CM

## 2022-12-15 DIAGNOSIS — I25.10 CORONARY ARTERY DISEASE INVOLVING NATIVE CORONARY ARTERY OF NATIVE HEART WITHOUT ANGINA PECTORIS: ICD-10-CM

## 2022-12-15 DIAGNOSIS — D84.821 IMMUNODEFICIENCY DUE TO TREATMENT WITH IMMUNOSUPPRESSIVE MEDICATION: ICD-10-CM

## 2022-12-15 DIAGNOSIS — I48.0 PAROXYSMAL ATRIAL FIBRILLATION: Primary | ICD-10-CM

## 2022-12-15 DIAGNOSIS — N25.81 SECONDARY RENAL HYPERPARATHYROIDISM: ICD-10-CM

## 2022-12-15 DIAGNOSIS — E78.2 MIXED HYPERLIPIDEMIA: ICD-10-CM

## 2022-12-15 DIAGNOSIS — K21.9 GASTROESOPHAGEAL REFLUX DISEASE, UNSPECIFIED WHETHER ESOPHAGITIS PRESENT: ICD-10-CM

## 2022-12-15 DIAGNOSIS — I50.32 CHRONIC HEART FAILURE WITH PRESERVED EJECTION FRACTION: ICD-10-CM

## 2022-12-15 DIAGNOSIS — K57.90 DIVERTICULOSIS: ICD-10-CM

## 2022-12-15 DIAGNOSIS — I10 PRIMARY HYPERTENSION: ICD-10-CM

## 2022-12-15 DIAGNOSIS — I48.91 ATRIAL FIBRILLATION, UNSPECIFIED TYPE: ICD-10-CM

## 2022-12-15 DIAGNOSIS — Z95.5 HISTORY OF CORONARY ANGIOPLASTY WITH INSERTION OF STENT: ICD-10-CM

## 2022-12-15 DIAGNOSIS — R06.09 DYSPNEA ON EXERTION: ICD-10-CM

## 2022-12-15 DIAGNOSIS — E74.39 GLUCOSE INTOLERANCE: ICD-10-CM

## 2022-12-15 DIAGNOSIS — J44.9 CHRONIC OBSTRUCTIVE PULMONARY DISEASE, UNSPECIFIED COPD TYPE: ICD-10-CM

## 2022-12-15 DIAGNOSIS — I48.91 ATRIAL FIBRILLATION, UNSPECIFIED TYPE: Primary | ICD-10-CM

## 2022-12-15 DIAGNOSIS — N18.4 CKD (CHRONIC KIDNEY DISEASE), STAGE IV: ICD-10-CM

## 2022-12-15 PROCEDURE — 1126F AMNT PAIN NOTED NONE PRSNT: CPT | Mod: CPTII,S$GLB,, | Performed by: STUDENT IN AN ORGANIZED HEALTH CARE EDUCATION/TRAINING PROGRAM

## 2022-12-15 PROCEDURE — 3008F BODY MASS INDEX DOCD: CPT | Mod: CPTII,S$GLB,, | Performed by: STUDENT IN AN ORGANIZED HEALTH CARE EDUCATION/TRAINING PROGRAM

## 2022-12-15 PROCEDURE — 3288F FALL RISK ASSESSMENT DOCD: CPT | Mod: CPTII,S$GLB,, | Performed by: STUDENT IN AN ORGANIZED HEALTH CARE EDUCATION/TRAINING PROGRAM

## 2022-12-15 PROCEDURE — 1159F MED LIST DOCD IN RCRD: CPT | Mod: CPTII,S$GLB,, | Performed by: STUDENT IN AN ORGANIZED HEALTH CARE EDUCATION/TRAINING PROGRAM

## 2022-12-15 PROCEDURE — 3008F PR BODY MASS INDEX (BMI) DOCUMENTED: ICD-10-PCS | Mod: CPTII,S$GLB,, | Performed by: STUDENT IN AN ORGANIZED HEALTH CARE EDUCATION/TRAINING PROGRAM

## 2022-12-15 PROCEDURE — 99999 PR PBB SHADOW E&M-EST. PATIENT-LVL II: ICD-10-PCS | Mod: PBBFAC,,, | Performed by: STUDENT IN AN ORGANIZED HEALTH CARE EDUCATION/TRAINING PROGRAM

## 2022-12-15 PROCEDURE — 99999 PR PBB SHADOW E&M-EST. PATIENT-LVL II: CPT | Mod: PBBFAC,,, | Performed by: STUDENT IN AN ORGANIZED HEALTH CARE EDUCATION/TRAINING PROGRAM

## 2022-12-15 PROCEDURE — 3044F PR MOST RECENT HEMOGLOBIN A1C LEVEL <7.0%: ICD-10-PCS | Mod: CPTII,S$GLB,, | Performed by: STUDENT IN AN ORGANIZED HEALTH CARE EDUCATION/TRAINING PROGRAM

## 2022-12-15 PROCEDURE — 93005 ELECTROCARDIOGRAM TRACING: CPT | Mod: S$GLB,,, | Performed by: STUDENT IN AN ORGANIZED HEALTH CARE EDUCATION/TRAINING PROGRAM

## 2022-12-15 PROCEDURE — 3078F PR MOST RECENT DIASTOLIC BLOOD PRESSURE < 80 MM HG: ICD-10-PCS | Mod: CPTII,S$GLB,, | Performed by: STUDENT IN AN ORGANIZED HEALTH CARE EDUCATION/TRAINING PROGRAM

## 2022-12-15 PROCEDURE — 1101F PT FALLS ASSESS-DOCD LE1/YR: CPT | Mod: CPTII,S$GLB,, | Performed by: STUDENT IN AN ORGANIZED HEALTH CARE EDUCATION/TRAINING PROGRAM

## 2022-12-15 PROCEDURE — 1159F PR MEDICATION LIST DOCUMENTED IN MEDICAL RECORD: ICD-10-PCS | Mod: CPTII,S$GLB,, | Performed by: STUDENT IN AN ORGANIZED HEALTH CARE EDUCATION/TRAINING PROGRAM

## 2022-12-15 PROCEDURE — 93010 ELECTROCARDIOGRAM REPORT: CPT | Mod: S$GLB,,, | Performed by: INTERNAL MEDICINE

## 2022-12-15 PROCEDURE — 3074F PR MOST RECENT SYSTOLIC BLOOD PRESSURE < 130 MM HG: ICD-10-PCS | Mod: CPTII,S$GLB,, | Performed by: STUDENT IN AN ORGANIZED HEALTH CARE EDUCATION/TRAINING PROGRAM

## 2022-12-15 PROCEDURE — 3288F PR FALLS RISK ASSESSMENT DOCUMENTED: ICD-10-PCS | Mod: CPTII,S$GLB,, | Performed by: STUDENT IN AN ORGANIZED HEALTH CARE EDUCATION/TRAINING PROGRAM

## 2022-12-15 PROCEDURE — 1101F PR PT FALLS ASSESS DOC 0-1 FALLS W/OUT INJ PAST YR: ICD-10-PCS | Mod: CPTII,S$GLB,, | Performed by: STUDENT IN AN ORGANIZED HEALTH CARE EDUCATION/TRAINING PROGRAM

## 2022-12-15 PROCEDURE — 3066F PR DOCUMENTATION OF TREATMENT FOR NEPHROPATHY: ICD-10-PCS | Mod: CPTII,S$GLB,, | Performed by: STUDENT IN AN ORGANIZED HEALTH CARE EDUCATION/TRAINING PROGRAM

## 2022-12-15 PROCEDURE — 3044F HG A1C LEVEL LT 7.0%: CPT | Mod: CPTII,S$GLB,, | Performed by: STUDENT IN AN ORGANIZED HEALTH CARE EDUCATION/TRAINING PROGRAM

## 2022-12-15 PROCEDURE — 93010 RHYTHM STRIP: ICD-10-PCS | Mod: S$GLB,,, | Performed by: INTERNAL MEDICINE

## 2022-12-15 PROCEDURE — 99214 OFFICE O/P EST MOD 30 MIN: CPT | Mod: S$GLB,,, | Performed by: STUDENT IN AN ORGANIZED HEALTH CARE EDUCATION/TRAINING PROGRAM

## 2022-12-15 PROCEDURE — 1126F PR PAIN SEVERITY QUANTIFIED, NO PAIN PRESENT: ICD-10-PCS | Mod: CPTII,S$GLB,, | Performed by: STUDENT IN AN ORGANIZED HEALTH CARE EDUCATION/TRAINING PROGRAM

## 2022-12-15 PROCEDURE — 3078F DIAST BP <80 MM HG: CPT | Mod: CPTII,S$GLB,, | Performed by: STUDENT IN AN ORGANIZED HEALTH CARE EDUCATION/TRAINING PROGRAM

## 2022-12-15 PROCEDURE — 3066F NEPHROPATHY DOC TX: CPT | Mod: CPTII,S$GLB,, | Performed by: STUDENT IN AN ORGANIZED HEALTH CARE EDUCATION/TRAINING PROGRAM

## 2022-12-15 PROCEDURE — 93005 RHYTHM STRIP: ICD-10-PCS | Mod: S$GLB,,, | Performed by: STUDENT IN AN ORGANIZED HEALTH CARE EDUCATION/TRAINING PROGRAM

## 2022-12-15 PROCEDURE — 3074F SYST BP LT 130 MM HG: CPT | Mod: CPTII,S$GLB,, | Performed by: STUDENT IN AN ORGANIZED HEALTH CARE EDUCATION/TRAINING PROGRAM

## 2022-12-15 PROCEDURE — 99214 PR OFFICE/OUTPT VISIT, EST, LEVL IV, 30-39 MIN: ICD-10-PCS | Mod: S$GLB,,, | Performed by: STUDENT IN AN ORGANIZED HEALTH CARE EDUCATION/TRAINING PROGRAM

## 2022-12-15 NOTE — PROGRESS NOTES
Electrophysiology Clinic Note    Reason for follow-up patient visit: Ongoing evaluation and recommendations regarding paroxysmal atrial fibrillation.     PRESENTING HISTORY:     History of Present Illness:  Mr. Ibrahima Phelps is a darshan 67-year-old gentleman who returns to clinic today for ongoing evaluation and recommendations regarding paroxysmal atrial fibrillation. He has a past medical history significant for paroxysmal atrial fibrillation, heart failure with preserved ejection fraction with most recent LVEF 55%, coronary artery disease with PCI in 2006, hypertension, hyperlipidemia, aortic atherosclerosis, glucose intolerance, CKD stage IV with a history of prior renal transplant x2 in 2002 and 2005 with chronic immunosuppressive medication, secondary hyperparathyroidism, diverticulosis, COPD, GERD, hypothyroidism, gout, a history of tobacco use, and obesity.     He was previously followed in EP with Dr. Blunt in regards to his atrial fibrillation, but requests to transition his care to a new provider. He was previously diagnosed with paroxysmal atrial fibrillation with RVR and underwent a successful cardioversion on 3/28/2022. He was the initiated on antiarrhythmic therapy with flecainide 50mg po BID - this dose was decreased to the 50mg po BID dosing in the setting of reported fatigue. He has remained on oral anticoagulation with apixaban with no adverse bleeding events reported.     Mr. Phelps presents to clinic today with his wife. In discussion with Mr. Phelps today, he tells me that he is feeling overall quite well. He denies any episodes of dizziness, lightheadedness, syncope/presyncope, chest pain or chest discomfort, palpitations, nausea or vomiting, orthopnea, or PND. He reports that occasionally he has lower extremity edema, and has remained compliant with his home lasix dose. He has not been weighing himself, but he was encouraged to frequently weigh himself in an effort to monitor his fluid status.  He has started wearing compression stockings and reports that this is helpful in preventing dependent edema. He reports baseline mild shortness of breath and dyspnea with exertion. He can climb one flight of stairs, but is limited based on hip, knee, and back osteoarthritis. He denies any recurrent atrial fibrillation per his report.     Review of Systems:  Review of Systems   Constitutional:  Negative for activity change.   HENT:  Positive for postnasal drip and rhinorrhea. Negative for nasal congestion, nosebleeds, sinus pressure/congestion, sneezing and sore throat.    Respiratory:  Positive for cough and shortness of breath. Negative for apnea, chest tightness and wheezing.    Cardiovascular:  Positive for leg swelling. Negative for chest pain and palpitations.   Gastrointestinal:  Negative for abdominal distention, abdominal pain, blood in stool, change in bowel habit, constipation, diarrhea, nausea, vomiting and change in bowel habit.   Genitourinary:  Negative for dysuria and hematuria.   Musculoskeletal:  Positive for arthralgias and back pain. Negative for gait problem.   Neurological:  Negative for dizziness, seizures, syncope, weakness, light-headedness, headaches, coordination difficulties and coordination difficulties.      PAST HISTORY:     Past Medical History:   Diagnosis Date    (HFpEF) heart failure with preserved ejection fraction 9/25/2017    Atrial fibrillation     CAD (coronary artery disease)     CHF (congestive heart failure)     Chronic diastolic heart failure 4/8/2020    CKD (chronic kidney disease) stage 3, GFR 30-59 ml/min     Dr. 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  He should return in 5 years.    COLONOSCOPY N/A 3/13/2020    Procedure: COLONOSCOPY;  Surgeon: Chon Casper MD;  Location: [facility] (4TH FLR);  Service: Endoscopy;  Laterality: N/A;  ok to hold coumadin x5days-see telephone encounter 2/4/20-tb    COLONOSCOPY N/A 2/4/2021    Procedure: COLONOSCOPY;  Surgeon: Chon Casper MD;  Location: [facility] (4TH FLR);  Service: Endoscopy;  Laterality: N/A;  Eliquis - per Dr. GAVIN Blunt ok to hold x 2 days and "restarted asap"- ERW  last prep "poor", [id number] ordered/ Pt requests Dr. Casper  prep ins. mailed - COVID screening on 2/1/21 PCW- ERW    COLONOSCOPY N/A 3/3/2021    Procedure: COLONOSCOPY;  Surgeon: Chon Casper MD;  Location: [facility] (4TH FLR);  Service: Endoscopy;  Laterality: N/A;  Patient with his second poor bowel pre and has poor prep today.  If patient not intersted or can't get colonoscopy tomorrow will need constipation bowel prep and will need to restart his Eliquis today.Thanks,Chon Blunt-ok to hold Eliquis 2 days prior      COVID     CORONARY ANGIOPLASTY WITH STENT PLACEMENT  9/13/2006    KIDNEY TRANSPLANT  2005    PARATHYROIDECTOMY      TREATMENT OF CARDIAC ARRHYTHMIA N/A 3/28/2022    Procedure: CARDIOVERSION;  Surgeon: Migue Blunt MD;  Location: [facility] EP LAB;  Service: Cardiology;  Laterality: N/A;  AF, DCC, MAC, GP, 3 PREP*RODRIGO deferred pt 100% compliant*       Family History:  Family History   Problem Relation Age of Onset    No Known Problems Sister     No Known Problems Brother     Thyroid disease Mother         s/p surgery "    Heart disease Father         had pacemaker    No Known Problems Sister     Kidney failure Sister     Kidney disease Sister     No Known Problems Sister     Kidney disease Brother     Kidney failure Brother         s/p transplant    Diabetes Mellitus Neg Hx     Stroke Neg Hx     Heart attack Neg Hx     Cancer Neg Hx     Celiac disease Neg Hx     Cirrhosis Neg Hx     Colon cancer Neg Hx     Esophageal cancer Neg Hx     Inflammatory bowel disease Neg Hx     Rectal cancer Neg Hx     Stomach cancer Neg Hx     Ulcerative colitis Neg Hx     Liver disease Neg Hx     Liver cancer Neg Hx     Crohn's disease Neg Hx     Melanoma Neg Hx        Social History:  He  reports that he quit smoking about 9 months ago. His smoking use included cigarettes. He has a 20.00 pack-year smoking history. He has never used smokeless tobacco. He reports that he does not drink alcohol and does not use drugs.      MEDICATIONS & ALLERGIES:     Review of patient's allergies indicates:   Allergen Reactions    Ace inhibitors Swelling    Verapamil Other (See Comments)     Other reaction(s): Unknown    Povidone-iodine Itching       Current Outpatient Medications on File Prior to Visit   Medication Sig Dispense Refill    albuterol (VENTOLIN HFA) 90 mcg/actuation inhaler Inhale 2 puffs into the lungs every 6 (six) hours as needed for Wheezing or Shortness of Breath. Rescue 18 g 3    ammonium lactate 12 % Crea Apply 1 g topically once daily. 140 g 1    aspirin (ECOTRIN) 81 MG EC tablet Take 1 tablet by mouth Daily.      atorvastatin (LIPITOR) 20 MG tablet Take 1 tablet (20 mg total) by mouth once daily. 90 tablet 3    calcium carbonate (OS-TRISH) 500 mg calcium (1,250 mg) tablet TAKE 2 TABLETS BY MOUTH TWICE A  tablet 3    clonazePAM (KLONOPIN) 0.5 MG tablet Take 1 tablet (0.5 mg total) by mouth 2 (two) times daily as needed for Anxiety. (Patient not taking: Reported on 12/12/2022) 60 tablet 0    colchicine (MITIGARE) 0.6 mg Cap One po BID prn gout  flare up to 3 days 15 capsule 11    doxazosin (CARDURA) 4 MG tablet TAKE 1 TABLET (4 MG TOTAL) BY MOUTH EVERY 12 (TWELVE) HOURS. 180 tablet 3    ELIQUIS 5 mg Tab TAKE 1 TABLET BY MOUTH TWICE A  tablet 3    famotidine (PEPCID) 20 MG tablet Take 1 tablet (20 mg total) by mouth 2 (two) times daily. 180 tablet 3    fexofenadine (ALLEGRA) 60 MG tablet Take 1 tablet by mouth Twice daily as needed.      flecainide (TAMBOCOR) 50 MG Tab Take 1 tablet (50 mg total) by mouth every 12 (twelve) hours. 180 tablet 3    fluticasone propion-salmeterol 115-21 mcg/dose (ADVAIR HFA) 115-21 mcg/actuation HFAA inhaler Inhale 2 puffs into the lungs every 12 (twelve) hours. Controller 12 g 5    fluticasone propionate (FLONASE) 50 mcg/actuation nasal spray 1 spray (50 mcg total) by Each Nostril route once daily. 18.2 mL 0    furosemide (LASIX) 40 MG tablet Take 1 tablet (40 mg total) by mouth 2 (two) times daily. 10 tablet 0    gabapentin (NEURONTIN) 300 MG capsule TAKE 1 CAPSULE BY MOUTH TWICE A  capsule 2    hydrALAZINE (APRESOLINE) 50 MG tablet TAKE 1 TABLET (50 MG TOTAL) BY MOUTH 3 (THREE) TIMES DAILY. 270 tablet 3    KLOR-CON 10 10 mEq TbSR TAKE 1 TABLET BY MOUTH EVERY DAY 90 tablet 4    metoprolol succinate (TOPROL-XL) 25 MG 24 hr tablet Take 1 tablet (25 mg total) by mouth once daily. 90 tablet 3    multivitamin (THERAGRAN) per tablet Take 1 tablet by mouth Daily.      mycophenolate (CELLCEPT) 250 mg Cap Take 2 capsules (500 mg total) by mouth 2 (two) times daily. 360 capsule 3    NIFEdipine (PROCARDIA-XL) 60 MG (OSM) 24 hr tablet Take 1 tablet (60 mg total) by mouth 2 (two) times a day. 14 tablet 0    omeprazole (PRILOSEC) 20 MG capsule Take 1 capsule (20 mg total) by mouth once daily. 14 capsule 0    paricalcitoL (ZEMPLAR) 1 MCG capsule TAKE 1 TABLET BY MOUTH ON MONDAY, WEDNESDAY, & FRIDAY 36 capsule 3    predniSONE (DELTASONE) 20 MG tablet Take 2 tablets (40 mg total) by mouth once daily. for 5 days 10 tablet 0     "predniSONE (DELTASONE) 5 MG tablet TAKE 1 TABLET BY MOUTH EVERY DAY IN THE MORNING 90 tablet 3    spironolactone (ALDACTONE) 25 MG tablet TAKE 1 TABLET EVERY DAY 90 tablet 3    sucralfate (CARAFATE) 1 gram tablet Take 1 tablet (1 g total) by mouth 4 (four) times daily as needed (abdominal pain). 20 tablet 0    tacrolimus (PROGRAF) 0.5 MG Cap TAKE 1 CAPSULE BY MOUTH EVERY EVENING. (TAKE WITH 2 1MG CAPSULES) 90 capsule 3    tacrolimus (PROGRAF) 1 MG Cap Two po in am and two po in pm 270 capsule 3    vitamin D 1000 units Tab Take 370 mg by mouth 2 (two) times daily. 2 tablets BID       No current facility-administered medications on file prior to visit.        OBJECTIVE:     Vital Signs:  /60   Pulse 65   Ht 6' 1" (1.854 m)   Wt 104.9 kg (231 lb 4.2 oz)   BMI 30.51 kg/m²     Physical Exam:  Physical Exam  Constitutional:       General: He is not in acute distress.     Appearance: Normal appearance. He is obese. He is not ill-appearing or diaphoretic.      Comments: Well-appearing man in NAD.   HENT:      Head: Normocephalic and atraumatic.      Comments: Mask worn in clinic in the setting of COVID precautions.   Eyes:      Pupils: Pupils are equal, round, and reactive to light.   Cardiovascular:      Rate and Rhythm: Normal rate and regular rhythm.      Pulses: Normal pulses.      Heart sounds: Normal heart sounds. No murmur heard.    No friction rub. No gallop.   Pulmonary:      Effort: Pulmonary effort is normal. No respiratory distress.      Breath sounds: Normal breath sounds. No wheezing, rhonchi or rales.   Chest:      Chest wall: No tenderness.   Abdominal:      General: There is no distension.      Palpations: Abdomen is soft.      Tenderness: There is no abdominal tenderness.   Musculoskeletal:         General: No swelling or tenderness.      Cervical back: Normal range of motion.      Right lower leg: Edema present.      Left lower leg: Edema present.      Comments: Trace bilateral pedal edema, " wearing compression stockings in clinic.    Skin:     General: Skin is warm and dry.      Findings: No erythema, lesion or rash.   Neurological:      General: No focal deficit present.      Mental Status: He is alert and oriented to person, place, and time. Mental status is at baseline.      Motor: No weakness.      Gait: Gait normal.   Psychiatric:         Mood and Affect: Mood normal.         Behavior: Behavior normal.        Laboratory Data:  Lab Results   Component Value Date    WBC 9.81 11/06/2022    HGB 11.5 (L) 11/06/2022    HCT 38.6 (L) 11/06/2022    MCV 81 (L) 11/06/2022     11/06/2022     Lab Results   Component Value Date     11/06/2022     11/06/2022    K 4.4 11/06/2022     11/06/2022    CO2 21 (L) 11/06/2022    BUN 28 (H) 11/06/2022    CREATININE 2.5 (H) 11/06/2022    CALCIUM 7.3 (L) 11/06/2022    MG 2.2 11/03/2022     Lab Results   Component Value Date    INR 1.1 03/21/2022    INR 1.2 06/17/2020    INR 1.2 06/17/2020       Pertinent Cardiac Data:  ECG: Normal sinus rhythm with rate of 65 bpm,  ms, LVH with repolarization changes  ms, QT/QTc 458/476 ms.     Resting 2D Transthoracic Echocardiogram - 3/23/2020:  Concentric left ventricular remodeling.  Normal left ventricular systolic function. The estimated ejection fraction is 60%.  Normal right ventricular systolic function.  Normal LV diastolic function.  Aortic sclerosis without significant stenosis.  Intermediate central venous pressure (8 mmHg).    Pharmacologic Nuclear Cardiac Stress Test - SPECT - 6/29/2020:    The perfusion scan is free of evidence from myocardial ischemia or injury.    Gated perfusion images showed an ejection fraction of 63% at rest and 67% post stress. Normal ejection fraction is greater than 51%.    There is normal wall motion at rest and post stress.    LV cavity size is normal at rest and normal at stress.    The EKG portion of this study is abnormal but not diagnostic.    The patient  reported no chest pain during the stress test.    Arrhythmias during stress: rare PACs, occasional PVCs.    There are no prior studies for comparison.    Resting 2D Transthoracic Echocardiogram - 3/21/2022:  Moderate left atrial enlargement.  The left ventricle is normal in size with concentric remodeling and normal systolic function.  The estimated ejection fraction is 55%.  There are segmental left ventricular wall motion abnormalities : akinetic basal inferior wall.  Indeterminate left ventricular diastolic function.  Mild right atrial enlargement.  Normal right ventricular size with normal right ventricular systolic function.  There is mild aortic valve stenosis.  Aortic valve area is 1.84 cm2; peak velocity is 2.60 m/s; mean gradient is 13 mmHg.  Mild aortic regurgitation.  Mild pulmonic regurgitation.  The estimated PA systolic pressure is 25 mmHg.  Normal central venous pressure (3 mmHg).    Pharmacologic Nuclear Cardiac Stress Test - SPECT - 4/28/2022:    Normal myocardial perfusion scan. There is no evidence of myocardial ischemia or infarction.    The visually estimated ejection fraction is normal at rest and normal during stress.    There is normal wall motion at rest and post stress.    LV cavity size is normal at rest and normal at stress.    The EKG portion of this study is abnormal but not diagnostic.    The patient reported no chest pain during the stress test.    There were no arrhythmias during stress.    When compared to the previous study from 6/29/2020, there are no significant changes.      ASSESSMENT & PLAN:   Mr. Ibrahima Phelps is a darshan 67-year-old gentleman who returns to clinic today for ongoing evaluation and recommendations regarding paroxysmal atrial fibrillation. He has a past medical history significant for paroxysmal atrial fibrillation, heart failure with preserved ejection fraction with most recent LVEF 55%, coronary artery disease with PCI in 2006, hypertension, hyperlipidemia,  aortic atherosclerosis, glucose intolerance, CKD stage IV with a history of prior renal transplant x2 in 2002 and 2005 with chronic immunosuppressive medication, secondary hyperparathyroidism, diverticulosis, COPD, GERD, hypothyroidism, gout, a history of tobacco use, and obesity.     - We discussed the pathophysiology of atrial fibrillation; specifically, we discussed paroxysmal atrial fibrillation and the concept that some patients may experience paroxysms of atrial fibrillation interrupting periods of sinus rhythm. We reviewed that there is an increased risk of CVA with atrial fibrillation.  - He remains on flecainide 50mg po BID with excellent control of his atrial fibrillation. He remains in sinus rhythm today and reports that he may have had two brief episodes of palpitations over the prior year. We reviewed the results of his prior pharmacologic nuclear stress SPECT that revealed no evidence of ischemia, with no evidence of structural heart disease appreciated on echocardiogram.   - Flecainide displays use-dependency, and he remains on metoprolol succinate 25mg po daily.  - His [id number] is 4 (HFpEF, HTN, aortic atherosclerosis, male gender, age 65-74), portending an annual adjusted risk of CVA of 4%. He remains on uninterrupted apixaban therapy with no bleeding events reported.   - We discussed the possibility of catheter ablation with pulmonary vein isolation should he continue to have symptoms and AF despite AAD therapy. He voices understanding, although he would like to remain on rhythm control with medication at this time.   - Possible underlying drivers of atrial fibrillation were addressed at this appointment, including recommendations for weight loss - now a class I recommendation. Review of laboratory data revealed stable and acceptable TSH at 1.212. A request for a sleep study was deferred by the patient today.       This patient will return to clinic with me in six months with continued  evaluation with his nephrologist, PCP, and general cardiology team in the interim. All questions and concerns were addressed at this encounter.     Signing Physician:       CHARLEY Parmar MD  Electrophysiology Attending

## 2022-12-17 PROBLEM — I48.0 PAROXYSMAL ATRIAL FIBRILLATION: Status: ACTIVE | Noted: 2017-09-25

## 2022-12-25 ENCOUNTER — HOSPITAL ENCOUNTER (EMERGENCY)
Facility: HOSPITAL | Age: 67
Discharge: HOME | End: 2022-12-25
Attending: EMERGENCY MEDICINE
Payer: MEDICARE

## 2022-12-25 VITALS
TEMPERATURE: 98 F | OXYGEN SATURATION: 98 % | HEART RATE: 60 BPM | WEIGHT: 237 LBS | RESPIRATION RATE: 18 BRPM | BODY MASS INDEX: 31.27 KG/M2 | SYSTOLIC BLOOD PRESSURE: 142 MMHG | DIASTOLIC BLOOD PRESSURE: 65 MMHG

## 2022-12-25 DIAGNOSIS — M54.41 ACUTE RIGHT-SIDED LOW BACK PAIN WITH RIGHT-SIDED SCIATICA: Primary | ICD-10-CM

## 2022-12-25 LAB
BUN SERPL-MCNC: 29 MG/DL (ref 6–30)
CHLORIDE SERPL-SCNC: 105 MMOL/L (ref 95–110)
CREAT SERPL-MCNC: 3 MG/DL (ref 0.5–1.4)
GLUCOSE SERPL-MCNC: 109 MG/DL (ref 70–110)
HCT VFR BLD CALC: 31 %PCV (ref 36–54)
POC IONIZED CALCIUM: 0.77 MMOL/L (ref 1.06–1.42)
POC TCO2 (MEASURED): 22 MMOL/L (ref 23–29)
POTASSIUM BLD-SCNC: 3.9 MMOL/L (ref 3.5–5.1)
SAMPLE: ABNORMAL
SODIUM BLD-SCNC: 139 MMOL/L (ref 136–145)

## 2022-12-25 PROCEDURE — 80047 BASIC METABLC PNL IONIZED CA: CPT

## 2022-12-25 PROCEDURE — 96372 THER/PROPH/DIAG INJ SC/IM: CPT | Performed by: EMERGENCY MEDICINE

## 2022-12-25 PROCEDURE — 63600175 PHARM REV CODE 636 W HCPCS: Performed by: EMERGENCY MEDICINE

## 2022-12-25 PROCEDURE — 99285 PR EMERGENCY DEPT VISIT,LEVEL V: ICD-10-PCS | Mod: ,,, | Performed by: EMERGENCY MEDICINE

## 2022-12-25 PROCEDURE — 25000003 PHARM REV CODE 250: Performed by: EMERGENCY MEDICINE

## 2022-12-25 PROCEDURE — 99284 EMERGENCY DEPT VISIT MOD MDM: CPT

## 2022-12-25 PROCEDURE — 99285 EMERGENCY DEPT VISIT HI MDM: CPT | Mod: ,,, | Performed by: EMERGENCY MEDICINE

## 2022-12-25 RX ORDER — ACETAMINOPHEN 500 MG
1000 TABLET ORAL
Status: COMPLETED | OUTPATIENT
Start: 2022-12-25 | End: 2022-12-25

## 2022-12-25 RX ORDER — LIDOCAINE 50 MG/G
1 PATCH TOPICAL DAILY PRN
Qty: 15 PATCH | Refills: 0 | Status: SHIPPED | OUTPATIENT
Start: 2022-12-25 | End: 2023-06-20

## 2022-12-25 RX ORDER — LIDOCAINE 50 MG/G
1 PATCH TOPICAL
Status: DISCONTINUED | OUTPATIENT
Start: 2022-12-25 | End: 2022-12-25 | Stop reason: HOSPADM

## 2022-12-25 RX ORDER — MORPHINE SULFATE 2 MG/ML
2 INJECTION, SOLUTION INTRAMUSCULAR; INTRAVENOUS
Status: COMPLETED | OUTPATIENT
Start: 2022-12-25 | End: 2022-12-25

## 2022-12-25 RX ORDER — ACETAMINOPHEN 500 MG
500 TABLET ORAL EVERY 6 HOURS PRN
Qty: 20 TABLET | Refills: 0 | Status: SHIPPED | OUTPATIENT
Start: 2022-12-25

## 2022-12-25 RX ADMIN — LIDOCAINE 1 PATCH: 50 PATCH CUTANEOUS at 10:12

## 2022-12-25 RX ADMIN — MORPHINE SULFATE 2 MG: 2 INJECTION, SOLUTION INTRAMUSCULAR; INTRAVENOUS at 11:12

## 2022-12-25 RX ADMIN — ACETAMINOPHEN 1000 MG: 500 TABLET ORAL at 10:12

## 2022-12-25 NOTE — ED NOTES
"Increasing lumbar back pain starting yesterday. RX Gabapentin at home, not relieving pain, last took yesterday.     Patient identifiers for Ibrahima Phelps 67 y.o. male checked and correct.  Chief Complaint   Patient presents with    Back Pain     "Inflamed disc" in lower lumbar area. Pt ambulatory in triage. Took Gabapentin without relief      Past Medical History:   Diagnosis Date    (HFpEF) heart failure with preserved ejection fraction 9/25/2017    Atrial fibrillation     CAD (coronary artery disease)     CHF (congestive heart failure)     Chronic diastolic heart failure 4/8/2020    CKD (chronic kidney disease) stage 3, GFR 30-59 ml/min     Dr. Latif    CKD (chronic kidney disease) stage 3, GFR 30-59 ml/min     Diverticulosis     Fever blister     Heart attack 2006    Hyperlipidemia     Hypertension     Immunodeficiency due to treatment with immunosuppressive medication     Keloid cicatrix     Left lower quadrant abdominal pain 6/5/2022    Metabolic bone disease     Pericarditis     S/P kidney transplant     Supraventricular tachycardia 06/2019    Thrombocytopenia     Thyroid disease     Tobacco use 9/5/2014    Unspecified disorder of kidney and ureter     Stage IIICKD     Allergies reported:   Review of patient's allergies indicates:   Allergen Reactions    Ace inhibitors Swelling    Verapamil Other (See Comments)     Other reaction(s): Unknown    Povidone-iodine Itching       Appearance: Pt awake, alert & oriented to person, place & time. Pt in no acute distress at present time. Pt is clean and well groomed with clothes appropriately fastened.   Skin: Skin warm, dry & intact. Color consistent with ethnicity. Mucous membranes moist. No breakdown or brusing noted.   Musculoskeletal: Patient moving all extremities well, no obvious swelling or deformities noted.   Respiratory: Respirations spontaneous, even, and non-labored. Visible chest rise noted. Airway is open and patent. No accessory muscle use noted. "   Neurologic: Sensation is intact. Speech is clear and appropriate. Eyes open spontaneously, behavior appropriate to situation, follows commands, facial expression symmetrical, bilateral hand grasp equal and even, purposeful motor response noted.  Cardiac: All peripheral pulses present. No Bilateral lower extremity edema. Cap refill is <3 seconds.  Abdomen: Abdomen soft, non distended, non tender to palpation.   : Pt voids independently, denies dysuria, hematuria, frequency.

## 2022-12-25 NOTE — ED PROVIDER NOTES
"Encounter Date: 12/25/2022       History     Chief Complaint   Patient presents with    Back Pain     "Inflamed disc" in lower lumbar area. Pt ambulatory in triage. Took Gabapentin without relief      68 yo male presenting with right low back pain.    PMH:  CHF, CAD, CKD, status post kidney transplant on immunosuppressant therapy, hyperlipidemia, AFib on Eliquis, SVT, anemia of chronic disease    Context:  Patient reports ongoing issues with his back and that he has herniated discs.  He reports pain began yesterday.  He denies any trauma or heavy lifting.  The pain is in his right lower back and radiates down his right leg.  Onset:  Gradual   Location: right low back, radiates down right leg when he lies flat   Duration:  Since yesterday   Modifiers:  Did not try OTC medications prior to arrival   Associated Symptoms:  Denies fevers, incontinence, urinary retention, weakness, numbness (including SA), h/o cancer       The history is provided by the patient and medical records. No  was used.   Review of patient's allergies indicates:   Allergen Reactions    Ace inhibitors Swelling    Verapamil Other (See Comments)     Other reaction(s): Unknown    Povidone-iodine Itching     Past Medical History:   Diagnosis Date    (HFpEF) heart failure with preserved ejection fraction 9/25/2017    Atrial fibrillation     CAD (coronary artery disease)     CHF (congestive heart failure)     Chronic diastolic heart failure 4/8/2020    CKD (chronic kidney disease) stage 3, GFR 30-59 ml/min     Dr. Latif    CKD (chronic kidney disease) stage 3, GFR 30-59 ml/min     Diverticulosis     Fever blister     Heart attack 2006    Hyperlipidemia     Hypertension     Immunodeficiency due to treatment with immunosuppressive medication     Keloid cicatrix     Left lower quadrant abdominal pain 6/5/2022    Metabolic bone disease     Pericarditis     S/P kidney transplant     Supraventricular tachycardia 06/2019    " "Thrombocytopenia     Thyroid disease     Tobacco use 9/5/2014    Unspecified disorder of kidney and ureter     Stage IIICKD     Past Surgical History:   Procedure Laterality Date    CARDIOVERSION  04/30/15    CHOLECYSTECTOMY      COLONOSCOPY  April 20, 2011    Diverticulosis, repeat recommended in 3 yrs., repeat colonoscopy 2014 revealed 2 polyps.  He should return in 5 years.    COLONOSCOPY N/A 3/13/2020    Procedure: COLONOSCOPY;  Surgeon: Chon Casper MD;  Location: [facility] ENDO (4TH FLR);  Service: Endoscopy;  Laterality: N/A;  ok to hold coumadin x5days-see telephone encounter 2/4/20-tb    COLONOSCOPY N/A 2/4/2021    Procedure: COLONOSCOPY;  Surgeon: Chon Casper MD;  Location: [facility] ENDO (4TH FLR);  Service: Endoscopy;  Laterality: N/A;  Eliquis - per Dr. GAVIN Blunt ok to hold x 2 days and "restarted asap"- ERW  last prep "poor", [id number] ordered/ Pt requests Dr. Casper  prep ins. mailed - COVID screening on 2/1/21 PCW- ERW    COLONOSCOPY N/A 3/3/2021    Procedure: COLONOSCOPY;  Surgeon: Chon Casper MD;  Location: [facility] ENDO (4TH FLR);  Service: Endoscopy;  Laterality: N/A;  Patient with his second poor bowel pre and has poor prep today.  If patient not intersted or can't get colonoscopy tomorrow will need constipation bowel prep and will need to restart his Eliquis today.Thanks,Chon Blunt-ok to hold Eliquis 2 days prior      COVID     CORONARY ANGIOPLASTY WITH STENT PLACEMENT  9/13/2006    KIDNEY TRANSPLANT  2005    PARATHYROIDECTOMY      TREATMENT OF CARDIAC ARRHYTHMIA N/A 3/28/2022    Procedure: CARDIOVERSION;  Surgeon: Migue Blunt MD;  Location: [facility] EP LAB;  Service: Cardiology;  Laterality: N/A;  AF, DCC, MAC, GP, 3 PREP*RODRIGO deferred pt 100% compliant*     Family History   Problem Relation Age of Onset    No Known Problems Sister     No Known Problems Brother     Thyroid disease Mother         s/p surgery    Heart disease Father         had pacemaker    No Known Problems Sister  "    Kidney failure Sister     Kidney disease Sister     No Known Problems Sister     Kidney disease Brother     Kidney failure Brother         s/p transplant    Diabetes Mellitus Neg Hx     Stroke Neg Hx     Heart attack Neg Hx     Cancer Neg Hx     Celiac disease Neg Hx     Cirrhosis Neg Hx     Colon cancer Neg Hx     Esophageal cancer Neg Hx     Inflammatory bowel disease Neg Hx     Rectal cancer Neg Hx     Stomach cancer Neg Hx     Ulcerative colitis Neg Hx     Liver disease Neg Hx     Liver cancer Neg Hx     Crohn's disease Neg Hx     Melanoma Neg Hx      Social History     Tobacco Use    Smoking status: Former     Packs/day: 0.50     Years: 40.00     Pack years: 20.00     Types: Cigarettes     Quit date: 2022     Years since quittin.8    Smokeless tobacco: Never    Tobacco comments:     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     Patient is currently retired   Substance Use Topics    Alcohol use: No    Drug use: No     Review of Systems   Constitutional:  Negative for fever.   HENT:  Negative for trouble swallowing.    Respiratory:  Negative for shortness of breath.    Cardiovascular:  Negative for chest pain.   Gastrointestinal:  Negative for abdominal pain.   Genitourinary:  Negative for dysuria.   Musculoskeletal:  Positive for back pain.   Skin:  Negative for rash.   Allergic/Immunologic: Positive for immunocompromised state.   Hematological:  Bruises/bleeds easily.     Physical Exam     Initial Vitals [22 0939]   BP Pulse Resp Temp SpO2   (!) 170/81 92 20 98 °F (36.7 °C) 99 %      MAP       --         Physical Exam    Nursing note and vitals reviewed.  Constitutional: He is not diaphoretic. No distress.   Appears uncomfortable 2/2 pain    HENT:   Head: Normocephalic and atraumatic.   Eyes: Right eye exhibits no discharge. Left eye exhibits no discharge.   Neck: Neck supple. No tracheal deviation present.   Cardiovascular:  Normal rate and regular rhythm.            Pulmonary/Chest: Breath sounds normal. No respiratory distress.   Abdominal: Abdomen is soft. There is no abdominal tenderness.   Musculoskeletal:      Cervical back: Neck supple.      Comments: No lumbar spinal TTP  Right lumbar paraspinal TTP with muscle spasm     5/5 hip flexion, knee extension/flexion, dorsi/plantar flexion bilaterally       Neurological: He is alert and oriented to person, place, and time.   Skin: Skin is warm. No rash noted.   Psychiatric: He has a normal mood and affect. His behavior is normal.       ED Course   Procedures  Labs Reviewed   ISTAT PROCEDURE - Abnormal; Notable for the following components:       Result Value    POC Creatinine 3.0 (*)     POC TCO2 (MEASURED) 22 (*)     POC Ionized Calcium 0.77 (*)     POC Hematocrit 31 (*)     All other components within normal limits          Imaging Results    None          Medications   acetaminophen tablet 1,000 mg (1,000 mg Oral Given 12/25/22 1037)   morphine injection 2 mg (2 mg Intramuscular Given 12/25/22 1120)     Medical Decision Making:   History:   Old Medical Records: I decided to obtain old medical records.  Old Records Summarized: other records.       <> Summary of Records: MRI from 1/2021:  Multilevel lumbar spondylosis as detailed above.  Of note, there is a right extraforaminal zone disc protrusion with adjacent edema, likely contacting the L3 nerve root.     Initial Assessment:   68 yo male presenting with atraumatic back pain.  On exam, he appears uncomfortable 2/2 pain.  Afebrile, nonfocal neurologic exam.   Differential Diagnosis:   Including, but not limited to:    Sciatica  Lumbar radiculopathy  No s/sx concerning for cauda equina, doubt spinal abscess or hematoma  Muscle spasm   Osteomyelitis/discitis     Clinical Tests:   Lab Tests: Ordered and Reviewed       <> Summary of Lab: Creatinine 3, at approximate baseline   ED Management:  Favor muscle spasm with sciatica. No spinal TTP.   Doubt infectious  pathology, no fevers.  Pain improved significantly in the ED and patient ambulatory without assistance.  Do not believe advanced spinal imaging is indicated today (and patient would like to leave given the holiday), but we discussed strict return precautions, including fever, urinary retention, incontinence, numbness, weakness, any new or concerning sx (kimberly given his risk factors of immune compromise).  Advised PCP f/u 3 days for repeat blood pressure check, f/u today's visit.  Spine referral placed.  All questions answered prior to discharge.  Return precautions given.  Patient understands and agrees with the plan.            ED Course as of 12/25/22 1500   Sun Dec 25, 2022   1059 Reassessment:  appears more comfortable, able to sit in recliner. Requesting a shot of some pain medication. [AB]      ED Course User Index  [AB] Nato Portillo MD                 Clinical Impression:   Final diagnoses:  [M54.41] Acute right-sided low back pain with right-sided sciatica (Primary)        ED Disposition Condition    Discharge Stable          ED Prescriptions       Medication Sig Dispense Start Date End Date Auth. Provider    acetaminophen (TYLENOL) 500 MG tablet Take 1 tablet (500 mg total) by mouth every 6 (six) hours as needed for Pain. 20 tablet 12/25/2022 -- Nato Portillo MD    LIDOcaine (LIDODERM) 5 % Place 1 patch onto the skin daily as needed (back pain). Remove & Discard patch within 12 hours or as directed by MD 15 patch 12/25/2022 -- Nato Portillo MD          Follow-up Information       Follow up With Specialties Details Why Contact Info Additional Information    Connie Magdaleno MD Internal Medicine In 1 week  [address]  [place] [address]  [phone]       Connie Magdaleno MD Internal Medicine In 3 days  [address]  [place] [address]  [phone]       [facility] - Emergency Dept Emergency Medicine  As needed, If symptoms worsen [address] [facility]  [id number] [phone]     [id number] week  [address] [facility] [id number] [phone] [facility] - Main Building, 5th Floor [facility] in [facility] and use Atrium elevator                        Nato Portillo MD  12/26/22 0182

## 2022-12-25 NOTE — ED NOTES
Patient identifiers verified and correct for Ibrahima Phelps  LOC: The patient is awake, alert and aware of environment with an appropriate affect, the patient is oriented x 3 and speaking appropriately.   APPEARANCE: Patient appears comfortable and in no acute distress, patient is clean and well groomed.  SKIN: The skin is warm and dry, color consistent with ethnicity, patient has normal skin turgor and moist mucus membranes, skin intact, no breakdown or bruising noted.   MUSCULOSKELETAL: Patient moving all extremities spontaneously, no swelling noted. Pt c/o back pain  RESPIRATORY: Airway is open and patent, respirations are spontaneous, patient has a normal effort and rate, no accessory muscle use noted, pt placed on continuous pulse ox with O2 sats noted at 98% on room air.  CARDIAC: Patient has a normal rate, no edema noted, capillary refill < 3 seconds.   GASTRO: Soft and non tender to palpation, no distention noted, normoactive bowel sounds present in all four quadrants. Pt states bowel movements have been regular.  : Pt denies any pain or frequency with urination.  NEURO: Pt opens eyes spontaneously, behavior appropriate to situation, follows commands, facial expression symmetrical, bilateral hand grasp equal and even, purposeful motor response noted, normal sensation in all extremities when touched with a finger.

## 2022-12-25 NOTE — ED NOTES
I-STAT Chem-8+ Results:   Value Reference Range   Sodium 139 136-145 mmol/L   Potassium  3.9 3.5-5.1 mmol/L   Chloride 105  mmol/L   Ionized Calcium 0.77 1.06-1.42 mmol/L   CO2 (measured) 22 23-29 mmol/L   Glucose 109  mg/dL   BUN 29 6-30 mg/dL   Creatinine 3.0 0.5-1.4 mg/dL   Hematocrit 31 36-54%

## 2022-12-28 ENCOUNTER — PATIENT MESSAGE (OUTPATIENT)
Dept: INTERNAL MEDICINE | Facility: CLINIC | Age: 67
End: 2022-12-28
Payer: MEDICARE

## 2022-12-28 DIAGNOSIS — M54.50 ACUTE LOW BACK PAIN, UNSPECIFIED BACK PAIN LATERALITY, UNSPECIFIED WHETHER SCIATICA PRESENT: Primary | ICD-10-CM

## 2022-12-28 RX ORDER — TIZANIDINE 4 MG/1
4 TABLET ORAL EVERY 8 HOURS PRN
Qty: 30 TABLET | Refills: 0 | Status: SHIPPED | OUTPATIENT
Start: 2022-12-28 | End: 2023-01-05

## 2023-01-03 NOTE — PROGRESS NOTES
Patient, Ibrahima Phelps (MRN #[id number]), presented with a recent Estimated Glumerular Filtration Rate (EGFR) between 15 and 29 consistent with the definition of chronic kidney disease stage 4 (ICD10 - N18.4).    eGFR if    Date Value Ref Range Status   07/30/2022 25.8 (A) >60 mL/min/1.73 m^2 Final       The patient's chronic kidney disease stage 4 was monitored, evaluated, addressed and/or treated. This addendum to the medical record is made on 01/02/2023.

## 2023-01-04 ENCOUNTER — LAB VISIT (OUTPATIENT)
Dept: PRIMARY CARE CLINIC | Facility: CLINIC | Age: 68
End: 2023-01-04
Payer: MEDICARE

## 2023-01-04 DIAGNOSIS — Z12.5 SCREENING PSA (PROSTATE SPECIFIC ANTIGEN): ICD-10-CM

## 2023-01-04 DIAGNOSIS — Z94.0 KIDNEY REPLACED BY TRANSPLANT: ICD-10-CM

## 2023-01-04 DIAGNOSIS — E78.2 MIXED HYPERLIPIDEMIA: ICD-10-CM

## 2023-01-04 DIAGNOSIS — R73.03 PREDIABETES: ICD-10-CM

## 2023-01-04 DIAGNOSIS — N18.4 CKD (CHRONIC KIDNEY DISEASE) STAGE 4, GFR 15-29 ML/MIN: ICD-10-CM

## 2023-01-04 LAB
ALBUMIN SERPL BCP-MCNC: 3.2 G/DL (ref 3.5–5.2)
ANION GAP SERPL CALC-SCNC: 14 MMOL/L (ref 8–16)
BACTERIA #/AREA URNS AUTO: ABNORMAL /HPF
BASOPHILS # BLD AUTO: 0.01 K/UL (ref 0–0.2)
BASOPHILS NFR BLD: 0.1 % (ref 0–1.9)
BILIRUB UR QL STRIP: NEGATIVE
BUN SERPL-MCNC: 31 MG/DL (ref 8–23)
CALCIUM SERPL-MCNC: 7.7 MG/DL (ref 8.7–10.5)
CHLORIDE SERPL-SCNC: 107 MMOL/L (ref 95–110)
CHOLEST SERPL-MCNC: 152 MG/DL (ref 120–199)
CHOLEST/HDLC SERPL: 3.2 {RATIO} (ref 2–5)
CLARITY UR REFRACT.AUTO: CLEAR
CO2 SERPL-SCNC: 21 MMOL/L (ref 23–29)
COLOR UR AUTO: YELLOW
COMPLEXED PSA SERPL-MCNC: 0.69 NG/ML (ref 0–4)
CREAT SERPL-MCNC: 2.6 MG/DL (ref 0.5–1.4)
CREAT UR-MCNC: 109 MG/DL (ref 23–375)
DIFFERENTIAL METHOD: ABNORMAL
EOSINOPHIL # BLD AUTO: 0.2 K/UL (ref 0–0.5)
EOSINOPHIL NFR BLD: 2.7 % (ref 0–8)
ERYTHROCYTE [DISTWIDTH] IN BLOOD BY AUTOMATED COUNT: 15.5 % (ref 11.5–14.5)
EST. GFR  (NO RACE VARIABLE): 26 ML/MIN/1.73 M^2
ESTIMATED AVG GLUCOSE: 111 MG/DL (ref 68–131)
GLUCOSE SERPL-MCNC: 89 MG/DL (ref 70–110)
GLUCOSE UR QL STRIP: NEGATIVE
HBA1C MFR BLD: 5.5 % (ref 4–5.6)
HCT VFR BLD AUTO: 32.8 % (ref 40–54)
HDLC SERPL-MCNC: 47 MG/DL (ref 40–75)
HDLC SERPL: 30.9 % (ref 20–50)
HGB BLD-MCNC: 9.7 G/DL (ref 14–18)
HGB UR QL STRIP: NEGATIVE
HYALINE CASTS UR QL AUTO: 5 /LPF
HYALINE CASTS UR QL AUTO: 7 /LPF
IMM GRANULOCYTES # BLD AUTO: 0.04 K/UL (ref 0–0.04)
IMM GRANULOCYTES NFR BLD AUTO: 0.6 % (ref 0–0.5)
KETONES UR QL STRIP: NEGATIVE
LDLC SERPL CALC-MCNC: 87.4 MG/DL (ref 63–159)
LEUKOCYTE ESTERASE UR QL STRIP: NEGATIVE
LYMPHOCYTES # BLD AUTO: 1.3 K/UL (ref 1–4.8)
LYMPHOCYTES NFR BLD: 18.6 % (ref 18–48)
MCH RBC QN AUTO: 24.4 PG (ref 27–31)
MCHC RBC AUTO-ENTMCNC: 29.6 G/DL (ref 32–36)
MCV RBC AUTO: 82 FL (ref 82–98)
MICROSCOPIC COMMENT: ABNORMAL
MICROSCOPIC COMMENT: ABNORMAL
MONOCYTES # BLD AUTO: 0.7 K/UL (ref 0.3–1)
MONOCYTES NFR BLD: 10.8 % (ref 4–15)
NEUTROPHILS # BLD AUTO: 4.5 K/UL (ref 1.8–7.7)
NEUTROPHILS NFR BLD: 67.2 % (ref 38–73)
NITRITE UR QL STRIP: NEGATIVE
NON-SQ EPI CELLS #/AREA URNS AUTO: <1 /HPF
NONHDLC SERPL-MCNC: 105 MG/DL
NRBC BLD-RTO: 0 /100 WBC
PH UR STRIP: 6 [PH] (ref 5–8)
PHOSPHATE SERPL-MCNC: 5.1 MG/DL (ref 2.7–4.5)
PLATELET # BLD AUTO: 230 K/UL (ref 150–450)
PMV BLD AUTO: 12.8 FL (ref 9.2–12.9)
POTASSIUM SERPL-SCNC: 4.3 MMOL/L (ref 3.5–5.1)
PROT UR QL STRIP: ABNORMAL
PROT UR-MCNC: 109 MG/DL (ref 0–15)
PROT/CREAT UR: 1 MG/G{CREAT} (ref 0–0.2)
PTH-INTACT SERPL-MCNC: 90.7 PG/ML (ref 9–77)
RBC # BLD AUTO: 3.98 M/UL (ref 4.6–6.2)
RBC #/AREA URNS AUTO: 1 /HPF (ref 0–4)
RBC #/AREA URNS AUTO: 2 /HPF (ref 0–4)
SODIUM SERPL-SCNC: 142 MMOL/L (ref 136–145)
SP GR UR STRIP: 1.01 (ref 1–1.03)
SQUAMOUS #/AREA URNS AUTO: 0 /HPF
SQUAMOUS #/AREA URNS AUTO: 2 /HPF
TRIGL SERPL-MCNC: 88 MG/DL (ref 30–150)
URN SPEC COLLECT METH UR: ABNORMAL
WBC # BLD AUTO: 6.73 K/UL (ref 3.9–12.7)
WBC #/AREA URNS AUTO: 6 /HPF (ref 0–5)
WBC #/AREA URNS AUTO: 9 /HPF (ref 0–5)

## 2023-01-04 PROCEDURE — 83970 ASSAY OF PARATHORMONE: CPT | Performed by: INTERNAL MEDICINE

## 2023-01-04 PROCEDURE — 80197 ASSAY OF TACROLIMUS: CPT | Performed by: INTERNAL MEDICINE

## 2023-01-04 PROCEDURE — 83036 HEMOGLOBIN GLYCOSYLATED A1C: CPT | Performed by: INTERNAL MEDICINE

## 2023-01-04 PROCEDURE — 85025 COMPLETE CBC W/AUTO DIFF WBC: CPT | Performed by: INTERNAL MEDICINE

## 2023-01-04 PROCEDURE — 80061 LIPID PANEL: CPT | Performed by: INTERNAL MEDICINE

## 2023-01-04 PROCEDURE — 80069 RENAL FUNCTION PANEL: CPT | Performed by: INTERNAL MEDICINE

## 2023-01-04 PROCEDURE — 82570 ASSAY OF URINE CREATININE: CPT | Performed by: INTERNAL MEDICINE

## 2023-01-04 PROCEDURE — 81001 URINALYSIS AUTO W/SCOPE: CPT | Performed by: INTERNAL MEDICINE

## 2023-01-04 PROCEDURE — 84153 ASSAY OF PSA TOTAL: CPT | Performed by: INTERNAL MEDICINE

## 2023-01-04 PROCEDURE — 36415 COLL VENOUS BLD VENIPUNCTURE: CPT | Performed by: INTERNAL MEDICINE

## 2023-01-05 ENCOUNTER — OFFICE VISIT (OUTPATIENT)
Dept: NEPHROLOGY | Facility: CLINIC | Age: 68
End: 2023-01-05
Payer: MEDICARE

## 2023-01-05 VITALS
SYSTOLIC BLOOD PRESSURE: 134 MMHG | OXYGEN SATURATION: 98 % | BODY MASS INDEX: 30.75 KG/M2 | HEART RATE: 65 BPM | HEIGHT: 73 IN | DIASTOLIC BLOOD PRESSURE: 60 MMHG | WEIGHT: 232 LBS

## 2023-01-05 DIAGNOSIS — R53.83 FATIGUE, UNSPECIFIED TYPE: ICD-10-CM

## 2023-01-05 DIAGNOSIS — R73.03 PREDIABETES: ICD-10-CM

## 2023-01-05 DIAGNOSIS — Z94.0 KIDNEY REPLACED BY TRANSPLANT: ICD-10-CM

## 2023-01-05 DIAGNOSIS — Z79.899 IMMUNOSUPPRESSIVE MANAGEMENT ENCOUNTER FOLLOWING KIDNEY TRANSPLANT: ICD-10-CM

## 2023-01-05 DIAGNOSIS — E55.9 VITAMIN D DEFICIENCY: ICD-10-CM

## 2023-01-05 DIAGNOSIS — N18.4 CKD (CHRONIC KIDNEY DISEASE) STAGE 4, GFR 15-29 ML/MIN: Primary | ICD-10-CM

## 2023-01-05 DIAGNOSIS — D63.1 ANEMIA DUE TO STAGE 4 CHRONIC KIDNEY DISEASE: ICD-10-CM

## 2023-01-05 DIAGNOSIS — Z94.0 H/O KIDNEY TRANSPLANT: ICD-10-CM

## 2023-01-05 DIAGNOSIS — I25.10 CORONARY ARTERY DISEASE INVOLVING NATIVE CORONARY ARTERY OF NATIVE HEART WITHOUT ANGINA PECTORIS: ICD-10-CM

## 2023-01-05 DIAGNOSIS — N25.81 SECONDARY RENAL HYPERPARATHYROIDISM: ICD-10-CM

## 2023-01-05 DIAGNOSIS — I10 PRIMARY HYPERTENSION: ICD-10-CM

## 2023-01-05 DIAGNOSIS — I48.0 PAROXYSMAL ATRIAL FIBRILLATION: ICD-10-CM

## 2023-01-05 DIAGNOSIS — N18.4 ANEMIA DUE TO STAGE 4 CHRONIC KIDNEY DISEASE: ICD-10-CM

## 2023-01-05 DIAGNOSIS — I50.32 CHRONIC DIASTOLIC HEART FAILURE: ICD-10-CM

## 2023-01-05 DIAGNOSIS — Z94.0 IMMUNOSUPPRESSIVE MANAGEMENT ENCOUNTER FOLLOWING KIDNEY TRANSPLANT: ICD-10-CM

## 2023-01-05 LAB — TACROLIMUS BLD-MCNC: 11.2 NG/ML (ref 5–15)

## 2023-01-05 PROCEDURE — 3075F PR MOST RECENT SYSTOLIC BLOOD PRESS GE 130-139MM HG: ICD-10-PCS | Mod: CPTII,S$GLB,, | Performed by: INTERNAL MEDICINE

## 2023-01-05 PROCEDURE — 3044F HG A1C LEVEL LT 7.0%: CPT | Mod: CPTII,S$GLB,, | Performed by: INTERNAL MEDICINE

## 2023-01-05 PROCEDURE — 1101F PR PT FALLS ASSESS DOC 0-1 FALLS W/OUT INJ PAST YR: ICD-10-PCS | Mod: CPTII,S$GLB,, | Performed by: INTERNAL MEDICINE

## 2023-01-05 PROCEDURE — 1125F PR PAIN SEVERITY QUANTIFIED, PAIN PRESENT: ICD-10-PCS | Mod: CPTII,S$GLB,, | Performed by: INTERNAL MEDICINE

## 2023-01-05 PROCEDURE — 3288F FALL RISK ASSESSMENT DOCD: CPT | Mod: CPTII,S$GLB,, | Performed by: INTERNAL MEDICINE

## 2023-01-05 PROCEDURE — 99215 PR OFFICE/OUTPT VISIT, EST, LEVL V, 40-54 MIN: ICD-10-PCS | Mod: S$GLB,,, | Performed by: INTERNAL MEDICINE

## 2023-01-05 PROCEDURE — 3066F NEPHROPATHY DOC TX: CPT | Mod: CPTII,S$GLB,, | Performed by: INTERNAL MEDICINE

## 2023-01-05 PROCEDURE — 1159F MED LIST DOCD IN RCRD: CPT | Mod: CPTII,S$GLB,, | Performed by: INTERNAL MEDICINE

## 2023-01-05 PROCEDURE — 99499 UNLISTED E&M SERVICE: CPT | Mod: S$GLB,,, | Performed by: INTERNAL MEDICINE

## 2023-01-05 PROCEDURE — 3078F PR MOST RECENT DIASTOLIC BLOOD PRESSURE < 80 MM HG: ICD-10-PCS | Mod: CPTII,S$GLB,, | Performed by: INTERNAL MEDICINE

## 2023-01-05 PROCEDURE — 3008F PR BODY MASS INDEX (BMI) DOCUMENTED: ICD-10-PCS | Mod: CPTII,S$GLB,, | Performed by: INTERNAL MEDICINE

## 2023-01-05 PROCEDURE — 1159F PR MEDICATION LIST DOCUMENTED IN MEDICAL RECORD: ICD-10-PCS | Mod: CPTII,S$GLB,, | Performed by: INTERNAL MEDICINE

## 2023-01-05 PROCEDURE — 3075F SYST BP GE 130 - 139MM HG: CPT | Mod: CPTII,S$GLB,, | Performed by: INTERNAL MEDICINE

## 2023-01-05 PROCEDURE — 99215 OFFICE O/P EST HI 40 MIN: CPT | Mod: S$GLB,,, | Performed by: INTERNAL MEDICINE

## 2023-01-05 PROCEDURE — 3066F PR DOCUMENTATION OF TREATMENT FOR NEPHROPATHY: ICD-10-PCS | Mod: CPTII,S$GLB,, | Performed by: INTERNAL MEDICINE

## 2023-01-05 PROCEDURE — 3044F PR MOST RECENT HEMOGLOBIN A1C LEVEL <7.0%: ICD-10-PCS | Mod: CPTII,S$GLB,, | Performed by: INTERNAL MEDICINE

## 2023-01-05 PROCEDURE — 3008F BODY MASS INDEX DOCD: CPT | Mod: CPTII,S$GLB,, | Performed by: INTERNAL MEDICINE

## 2023-01-05 PROCEDURE — 99499 RISK ADDL DX/OHS AUDIT: ICD-10-PCS | Mod: S$GLB,,, | Performed by: INTERNAL MEDICINE

## 2023-01-05 PROCEDURE — 1101F PT FALLS ASSESS-DOCD LE1/YR: CPT | Mod: CPTII,S$GLB,, | Performed by: INTERNAL MEDICINE

## 2023-01-05 PROCEDURE — 99999 PR PBB SHADOW E&M-EST. PATIENT-LVL V: CPT | Mod: PBBFAC,,, | Performed by: INTERNAL MEDICINE

## 2023-01-05 PROCEDURE — 3288F PR FALLS RISK ASSESSMENT DOCUMENTED: ICD-10-PCS | Mod: CPTII,S$GLB,, | Performed by: INTERNAL MEDICINE

## 2023-01-05 PROCEDURE — 3078F DIAST BP <80 MM HG: CPT | Mod: CPTII,S$GLB,, | Performed by: INTERNAL MEDICINE

## 2023-01-05 PROCEDURE — 1125F AMNT PAIN NOTED PAIN PRSNT: CPT | Mod: CPTII,S$GLB,, | Performed by: INTERNAL MEDICINE

## 2023-01-05 PROCEDURE — 99999 PR PBB SHADOW E&M-EST. PATIENT-LVL V: ICD-10-PCS | Mod: PBBFAC,,, | Performed by: INTERNAL MEDICINE

## 2023-01-05 RX ORDER — ATORVASTATIN CALCIUM 10 MG/1
10 TABLET, FILM COATED ORAL
COMMUNITY
Start: 2022-12-21 | End: 2023-06-05

## 2023-01-05 RX ORDER — BNT162B2 ORIGINAL AND OMICRON BA.4/BA.5 .1125; .1125 MG/2.25ML; MG/2.25ML
INJECTION, SUSPENSION INTRAMUSCULAR
Status: ON HOLD | COMMUNITY
Start: 2022-10-13 | End: 2023-02-10 | Stop reason: HOSPADM

## 2023-01-05 RX ORDER — INFLUENZA A VIRUS A/VICTORIA/2570/2019 IVR-215 (H1N1) ANTIGEN (FORMALDEHYDE INACTIVATED), INFLUENZA A VIRUS A/DARWIN/6/2021 IVR-227 (H3N2) ANTIGEN (FORMALDEHYDE INACTIVATED), INFLUENZA B VIRUS B/AUSTRIA/1359417/2021 BVR-26 ANTIGEN (FORMALDEHYDE INACTIVATED), INFLUENZA B VIRUS B/PHUKET/3073/2013 BVR-1B ANTIGEN (FORMALDEHYDE INACTIVATED) 15; 15; 15; 15 UG/.5ML; UG/.5ML; UG/.5ML; UG/.5ML
INJECTION, SUSPENSION INTRAMUSCULAR
Status: ON HOLD | COMMUNITY
Start: 2022-10-13 | End: 2023-02-10 | Stop reason: HOSPADM

## 2023-01-05 RX ORDER — CYCLOBENZAPRINE HCL 5 MG
5 TABLET ORAL 2 TIMES DAILY PRN
Qty: 28 TABLET | Refills: 1 | Status: SHIPPED | OUTPATIENT
Start: 2023-01-05 | End: 2023-01-15

## 2023-01-06 ENCOUNTER — HOSPITAL ENCOUNTER (OUTPATIENT)
Dept: CARDIOLOGY | Facility: CLINIC | Age: 68
Discharge: HOME | End: 2023-01-06
Payer: MEDICARE

## 2023-01-06 ENCOUNTER — PATIENT MESSAGE (OUTPATIENT)
Dept: NEPHROLOGY | Facility: CLINIC | Age: 68
End: 2023-01-06
Payer: MEDICARE

## 2023-01-06 ENCOUNTER — OFFICE VISIT (OUTPATIENT)
Dept: ELECTROPHYSIOLOGY | Facility: CLINIC | Age: 68
End: 2023-01-06
Payer: MEDICARE

## 2023-01-06 ENCOUNTER — LAB VISIT (OUTPATIENT)
Dept: LAB | Facility: HOSPITAL | Age: 68
End: 2023-01-06
Attending: INTERNAL MEDICINE
Payer: MEDICARE

## 2023-01-06 VITALS
SYSTOLIC BLOOD PRESSURE: 194 MMHG | WEIGHT: 232.38 LBS | HEIGHT: 73 IN | DIASTOLIC BLOOD PRESSURE: 83 MMHG | HEART RATE: 58 BPM | BODY MASS INDEX: 30.8 KG/M2

## 2023-01-06 DIAGNOSIS — D84.821 IMMUNODEFICIENCY DUE TO TREATMENT WITH IMMUNOSUPPRESSIVE MEDICATION: ICD-10-CM

## 2023-01-06 DIAGNOSIS — I47.10 SUPRAVENTRICULAR TACHYCARDIA: ICD-10-CM

## 2023-01-06 DIAGNOSIS — R53.83 FATIGUE, UNSPECIFIED TYPE: ICD-10-CM

## 2023-01-06 DIAGNOSIS — I10 PRIMARY HYPERTENSION: ICD-10-CM

## 2023-01-06 DIAGNOSIS — Z79.899 IMMUNODEFICIENCY DUE TO TREATMENT WITH IMMUNOSUPPRESSIVE MEDICATION: ICD-10-CM

## 2023-01-06 DIAGNOSIS — E66.9 OBESITY (BMI 30-39.9): ICD-10-CM

## 2023-01-06 DIAGNOSIS — I25.10 CORONARY ARTERY DISEASE INVOLVING NATIVE CORONARY ARTERY OF NATIVE HEART WITHOUT ANGINA PECTORIS: ICD-10-CM

## 2023-01-06 DIAGNOSIS — E03.9 HYPOTHYROIDISM, UNSPECIFIED TYPE: ICD-10-CM

## 2023-01-06 DIAGNOSIS — K21.9 GASTROESOPHAGEAL REFLUX DISEASE, UNSPECIFIED WHETHER ESOPHAGITIS PRESENT: ICD-10-CM

## 2023-01-06 DIAGNOSIS — I50.32 CHRONIC HEART FAILURE WITH PRESERVED EJECTION FRACTION: ICD-10-CM

## 2023-01-06 DIAGNOSIS — Z94.0 H/O KIDNEY TRANSPLANT: ICD-10-CM

## 2023-01-06 DIAGNOSIS — N25.81 SECONDARY RENAL HYPERPARATHYROIDISM: ICD-10-CM

## 2023-01-06 DIAGNOSIS — I48.91 ATRIAL FIBRILLATION, UNSPECIFIED TYPE: ICD-10-CM

## 2023-01-06 DIAGNOSIS — N18.4 CKD (CHRONIC KIDNEY DISEASE), STAGE IV: ICD-10-CM

## 2023-01-06 DIAGNOSIS — K57.90 DIVERTICULOSIS: ICD-10-CM

## 2023-01-06 DIAGNOSIS — N18.4 CKD (CHRONIC KIDNEY DISEASE) STAGE 4, GFR 15-29 ML/MIN: ICD-10-CM

## 2023-01-06 DIAGNOSIS — Z95.5 HISTORY OF CORONARY ANGIOPLASTY WITH INSERTION OF STENT: ICD-10-CM

## 2023-01-06 DIAGNOSIS — R07.89 MUSCULOSKELETAL CHEST PAIN: ICD-10-CM

## 2023-01-06 DIAGNOSIS — Z79.01 LONG TERM (CURRENT) USE OF ANTICOAGULANTS: ICD-10-CM

## 2023-01-06 DIAGNOSIS — E78.2 MIXED HYPERLIPIDEMIA: ICD-10-CM

## 2023-01-06 DIAGNOSIS — J44.9 CHRONIC OBSTRUCTIVE PULMONARY DISEASE, UNSPECIFIED COPD TYPE: ICD-10-CM

## 2023-01-06 DIAGNOSIS — Z94.0 H/O KIDNEY TRANSPLANT: Primary | ICD-10-CM

## 2023-01-06 DIAGNOSIS — Z87.891 HISTORY OF TOBACCO USE: ICD-10-CM

## 2023-01-06 DIAGNOSIS — I48.0 PAROXYSMAL ATRIAL FIBRILLATION: Primary | ICD-10-CM

## 2023-01-06 DIAGNOSIS — I70.0 AORTIC ATHEROSCLEROSIS: ICD-10-CM

## 2023-01-06 DIAGNOSIS — E74.39 GLUCOSE INTOLERANCE: ICD-10-CM

## 2023-01-06 LAB
BASOPHILS # BLD AUTO: 0.03 K/UL (ref 0–0.2)
BASOPHILS NFR BLD: 0.4 % (ref 0–1.9)
DIFFERENTIAL METHOD: ABNORMAL
EOSINOPHIL # BLD AUTO: 0.2 K/UL (ref 0–0.5)
EOSINOPHIL NFR BLD: 3.2 % (ref 0–8)
ERYTHROCYTE [DISTWIDTH] IN BLOOD BY AUTOMATED COUNT: 15 % (ref 11.5–14.5)
FERRITIN SERPL-MCNC: 952 NG/ML (ref 20–300)
HCT VFR BLD AUTO: 34.3 % (ref 40–54)
HGB BLD-MCNC: 9.8 G/DL (ref 14–18)
IMM GRANULOCYTES # BLD AUTO: 0.05 K/UL (ref 0–0.04)
IMM GRANULOCYTES NFR BLD AUTO: 0.7 % (ref 0–0.5)
IRON SERPL-MCNC: 43 UG/DL (ref 45–160)
LYMPHOCYTES # BLD AUTO: 1.4 K/UL (ref 1–4.8)
LYMPHOCYTES NFR BLD: 19.3 % (ref 18–48)
MCH RBC QN AUTO: 23.8 PG (ref 27–31)
MCHC RBC AUTO-ENTMCNC: 28.6 G/DL (ref 32–36)
MCV RBC AUTO: 83 FL (ref 82–98)
MONOCYTES # BLD AUTO: 0.6 K/UL (ref 0.3–1)
MONOCYTES NFR BLD: 8.6 % (ref 4–15)
NEUTROPHILS # BLD AUTO: 4.9 K/UL (ref 1.8–7.7)
NEUTROPHILS NFR BLD: 67.8 % (ref 38–73)
NRBC BLD-RTO: 0 /100 WBC
PLATELET # BLD AUTO: 248 K/UL (ref 150–450)
PMV BLD AUTO: 11.6 FL (ref 9.2–12.9)
RBC # BLD AUTO: 4.12 M/UL (ref 4.6–6.2)
SATURATED IRON: 18 % (ref 20–50)
TACROLIMUS BLD-MCNC: 2.8 NG/ML (ref 5–15)
TOTAL IRON BINDING CAPACITY: 234 UG/DL (ref 250–450)
TRANSFERRIN SERPL-MCNC: 158 MG/DL (ref 200–375)
WBC # BLD AUTO: 7.25 K/UL (ref 3.9–12.7)

## 2023-01-06 PROCEDURE — 93005 RHYTHM STRIP: ICD-10-PCS | Mod: S$GLB,,, | Performed by: STUDENT IN AN ORGANIZED HEALTH CARE EDUCATION/TRAINING PROGRAM

## 2023-01-06 PROCEDURE — 1101F PT FALLS ASSESS-DOCD LE1/YR: CPT | Mod: CPTII,S$GLB,, | Performed by: STUDENT IN AN ORGANIZED HEALTH CARE EDUCATION/TRAINING PROGRAM

## 2023-01-06 PROCEDURE — 3288F FALL RISK ASSESSMENT DOCD: CPT | Mod: CPTII,S$GLB,, | Performed by: STUDENT IN AN ORGANIZED HEALTH CARE EDUCATION/TRAINING PROGRAM

## 2023-01-06 PROCEDURE — 3288F PR FALLS RISK ASSESSMENT DOCUMENTED: ICD-10-PCS | Mod: CPTII,S$GLB,, | Performed by: STUDENT IN AN ORGANIZED HEALTH CARE EDUCATION/TRAINING PROGRAM

## 2023-01-06 PROCEDURE — 3044F PR MOST RECENT HEMOGLOBIN A1C LEVEL <7.0%: ICD-10-PCS | Mod: CPTII,S$GLB,, | Performed by: STUDENT IN AN ORGANIZED HEALTH CARE EDUCATION/TRAINING PROGRAM

## 2023-01-06 PROCEDURE — 99499 UNLISTED E&M SERVICE: CPT | Mod: S$GLB,,, | Performed by: STUDENT IN AN ORGANIZED HEALTH CARE EDUCATION/TRAINING PROGRAM

## 2023-01-06 PROCEDURE — 93010 RHYTHM STRIP: ICD-10-PCS | Mod: S$GLB,,, | Performed by: INTERNAL MEDICINE

## 2023-01-06 PROCEDURE — 3066F PR DOCUMENTATION OF TREATMENT FOR NEPHROPATHY: ICD-10-PCS | Mod: CPTII,S$GLB,, | Performed by: STUDENT IN AN ORGANIZED HEALTH CARE EDUCATION/TRAINING PROGRAM

## 2023-01-06 PROCEDURE — 1125F AMNT PAIN NOTED PAIN PRSNT: CPT | Mod: CPTII,S$GLB,, | Performed by: STUDENT IN AN ORGANIZED HEALTH CARE EDUCATION/TRAINING PROGRAM

## 2023-01-06 PROCEDURE — 36415 COLL VENOUS BLD VENIPUNCTURE: CPT | Performed by: INTERNAL MEDICINE

## 2023-01-06 PROCEDURE — 3044F HG A1C LEVEL LT 7.0%: CPT | Mod: CPTII,S$GLB,, | Performed by: STUDENT IN AN ORGANIZED HEALTH CARE EDUCATION/TRAINING PROGRAM

## 2023-01-06 PROCEDURE — 85025 COMPLETE CBC W/AUTO DIFF WBC: CPT | Performed by: INTERNAL MEDICINE

## 2023-01-06 PROCEDURE — 3079F PR MOST RECENT DIASTOLIC BLOOD PRESSURE 80-89 MM HG: ICD-10-PCS | Mod: CPTII,S$GLB,, | Performed by: STUDENT IN AN ORGANIZED HEALTH CARE EDUCATION/TRAINING PROGRAM

## 2023-01-06 PROCEDURE — 82728 ASSAY OF FERRITIN: CPT | Performed by: INTERNAL MEDICINE

## 2023-01-06 PROCEDURE — 3077F PR MOST RECENT SYSTOLIC BLOOD PRESSURE >= 140 MM HG: ICD-10-PCS | Mod: CPTII,S$GLB,, | Performed by: STUDENT IN AN ORGANIZED HEALTH CARE EDUCATION/TRAINING PROGRAM

## 2023-01-06 PROCEDURE — 99214 OFFICE O/P EST MOD 30 MIN: CPT | Mod: S$GLB,,, | Performed by: STUDENT IN AN ORGANIZED HEALTH CARE EDUCATION/TRAINING PROGRAM

## 2023-01-06 PROCEDURE — 93010 ELECTROCARDIOGRAM REPORT: CPT | Mod: S$GLB,,, | Performed by: INTERNAL MEDICINE

## 2023-01-06 PROCEDURE — 3066F NEPHROPATHY DOC TX: CPT | Mod: CPTII,S$GLB,, | Performed by: STUDENT IN AN ORGANIZED HEALTH CARE EDUCATION/TRAINING PROGRAM

## 2023-01-06 PROCEDURE — 99999 PR PBB SHADOW E&M-EST. PATIENT-LVL II: ICD-10-PCS | Mod: PBBFAC,,, | Performed by: STUDENT IN AN ORGANIZED HEALTH CARE EDUCATION/TRAINING PROGRAM

## 2023-01-06 PROCEDURE — 84466 ASSAY OF TRANSFERRIN: CPT | Performed by: INTERNAL MEDICINE

## 2023-01-06 PROCEDURE — 99499 RISK ADDL DX/OHS AUDIT: ICD-10-PCS | Mod: S$GLB,,, | Performed by: STUDENT IN AN ORGANIZED HEALTH CARE EDUCATION/TRAINING PROGRAM

## 2023-01-06 PROCEDURE — 99999 PR PBB SHADOW E&M-EST. PATIENT-LVL II: CPT | Mod: PBBFAC,,, | Performed by: STUDENT IN AN ORGANIZED HEALTH CARE EDUCATION/TRAINING PROGRAM

## 2023-01-06 PROCEDURE — 3077F SYST BP >= 140 MM HG: CPT | Mod: CPTII,S$GLB,, | Performed by: STUDENT IN AN ORGANIZED HEALTH CARE EDUCATION/TRAINING PROGRAM

## 2023-01-06 PROCEDURE — 3079F DIAST BP 80-89 MM HG: CPT | Mod: CPTII,S$GLB,, | Performed by: STUDENT IN AN ORGANIZED HEALTH CARE EDUCATION/TRAINING PROGRAM

## 2023-01-06 PROCEDURE — 99214 PR OFFICE/OUTPT VISIT, EST, LEVL IV, 30-39 MIN: ICD-10-PCS | Mod: S$GLB,,, | Performed by: STUDENT IN AN ORGANIZED HEALTH CARE EDUCATION/TRAINING PROGRAM

## 2023-01-06 PROCEDURE — 3008F PR BODY MASS INDEX (BMI) DOCUMENTED: ICD-10-PCS | Mod: CPTII,S$GLB,, | Performed by: STUDENT IN AN ORGANIZED HEALTH CARE EDUCATION/TRAINING PROGRAM

## 2023-01-06 PROCEDURE — 1101F PR PT FALLS ASSESS DOC 0-1 FALLS W/OUT INJ PAST YR: ICD-10-PCS | Mod: CPTII,S$GLB,, | Performed by: STUDENT IN AN ORGANIZED HEALTH CARE EDUCATION/TRAINING PROGRAM

## 2023-01-06 PROCEDURE — 1125F PR PAIN SEVERITY QUANTIFIED, PAIN PRESENT: ICD-10-PCS | Mod: CPTII,S$GLB,, | Performed by: STUDENT IN AN ORGANIZED HEALTH CARE EDUCATION/TRAINING PROGRAM

## 2023-01-06 PROCEDURE — 3008F BODY MASS INDEX DOCD: CPT | Mod: CPTII,S$GLB,, | Performed by: STUDENT IN AN ORGANIZED HEALTH CARE EDUCATION/TRAINING PROGRAM

## 2023-01-06 PROCEDURE — 80197 ASSAY OF TACROLIMUS: CPT | Performed by: INTERNAL MEDICINE

## 2023-01-06 PROCEDURE — 93005 ELECTROCARDIOGRAM TRACING: CPT | Mod: S$GLB,,, | Performed by: STUDENT IN AN ORGANIZED HEALTH CARE EDUCATION/TRAINING PROGRAM

## 2023-01-06 NOTE — PROGRESS NOTES
Electrophysiology Clinic Note    Reason for follow-up patient visit: Ongoing evaluation and recommendations regarding paroxysmal atrial fibrillation with a recent episode of musculoskeletal chest pain.    PRESENTING HISTORY:     History of Present Illness:  Mr. Ibrahima Phelps is a darshan 67-year-old gentleman who returns to clinic today for ongoing evaluation and recommendations regarding paroxysmal atrial fibrillation with a recent episode of musculoskeletal chest pain. He has a past medical history significant for paroxysmal atrial fibrillation, heart failure with preserved ejection fraction with most recent LVEF 55%, coronary artery disease with PCI in 2006, hypertension, hyperlipidemia, aortic atherosclerosis, glucose intolerance, CKD stage IV with a history of prior renal transplant x2 in 2002 and 2005 with chronic immunosuppressive medication, secondary hyperparathyroidism, diverticulosis, COPD, GERD, hypothyroidism, gout, a history of tobacco use, and obesity.     He was previously followed in EP with Dr. Blunt in regards to his atrial fibrillation, but requests to transition his care to a new provider. He was previously diagnosed with paroxysmal atrial fibrillation with RVR and underwent a successful cardioversion on 3/28/2022. He was the initiated on antiarrhythmic therapy with flecainide 50mg po BID - this dose was decreased to the 50mg po BID dosing in the setting of reported fatigue. He has remained on oral anticoagulation with apixaban with no adverse bleeding events reported. He recently reported an episode of musculoskeletal chest pain with arm movement of the left arm that lasted approximately 5 seconds before spontaneously resolving.     Mr. Phelps presents to clinic today with his wife. In discussion with Mr. Phelps today, he tells me that he is feeling overall quite well. He denies any episodes of dizziness, lightheadedness, syncope/presyncope, recurrent chest pain or chest discomfort, palpitations,  nausea or vomiting, orthopnea, or PND. He reports that occasionally he has lower extremity edema, and has remained compliant with his home lasix dose. He has not been weighing himself, but he was encouraged to frequently weigh himself in an effort to monitor his fluid status, and was recently evaluated by his nephrology team earlier this week. He has started wearing compression stockings and reports that this is helpful in preventing dependent edema. He reports baseline mild shortness of breath and dyspnea with exertion. He can climb one flight of stairs, but is limited based on hip, knee, and back osteoarthritis. He denies any recurrent atrial fibrillation per his report.     Review of Systems:  Review of Systems   Constitutional:  Negative for activity change.   HENT:  Positive for postnasal drip and rhinorrhea. Negative for nasal congestion, nosebleeds, sinus pressure/congestion, sneezing and sore throat.    Respiratory:  Positive for cough and shortness of breath. Negative for apnea, chest tightness and wheezing.    Cardiovascular:  Positive for leg swelling. Negative for chest pain and palpitations.   Gastrointestinal:  Negative for abdominal distention, abdominal pain, blood in stool, change in bowel habit, constipation, diarrhea, nausea, vomiting and change in bowel habit.   Genitourinary:  Negative for dysuria and hematuria.   Musculoskeletal:  Positive for arthralgias and back pain. Negative for gait problem.   Neurological:  Negative for dizziness, seizures, syncope, weakness, light-headedness, headaches, coordination difficulties and coordination difficulties.      PAST HISTORY:     Past Medical History:   Diagnosis Date    (HFpEF) heart failure with preserved ejection fraction 9/25/2017    Atrial fibrillation     CAD (coronary artery disease)     CHF (congestive heart failure)     Chronic diastolic heart failure 4/8/2020    CKD (chronic kidney disease) stage 3, GFR 30-59 ml/min     Dr. Latif    CKD  "(chronic kidney disease) stage 3, GFR 30-59 ml/min     Diverticulosis     Fever blister     Heart attack 2006    Hyperlipidemia     Hypertension     Immunodeficiency due to treatment with immunosuppressive medication     Keloid cicatrix     Left lower quadrant abdominal pain 6/5/2022    Metabolic bone disease     Pericarditis     S/P kidney transplant     Supraventricular tachycardia 06/2019    Thrombocytopenia     Thyroid disease     Tobacco use 9/5/2014    Unspecified disorder of kidney and ureter     Stage IIICKD       Past Surgical History:   Procedure Laterality Date    CARDIOVERSION  04/30/15    CHOLECYSTECTOMY      COLONOSCOPY  April 20, 2011    Diverticulosis, repeat recommended in 3 yrs., repeat colonoscopy 2014 revealed 2 polyps.  He should return in 5 years.    COLONOSCOPY N/A 3/13/2020    Procedure: COLONOSCOPY;  Surgeon: Chon Casper MD;  Location: [facility] (4TH FLR);  Service: Endoscopy;  Laterality: N/A;  ok to hold coumadin x5days-see telephone encounter 2/4/20-tb    COLONOSCOPY N/A 2/4/2021    Procedure: COLONOSCOPY;  Surgeon: Chon Casper MD;  Location: [facility] (4TH FLR);  Service: Endoscopy;  Laterality: N/A;  Eliquis - per Dr. GAVIN Blunt ok to hold x 2 days and "restarted asap"- ERW  last prep "poor", [id number] ordered/ Pt requests Dr. Casper  prep ins. mailed - COVID screening on 2/1/21 PCW- ERW    COLONOSCOPY N/A 3/3/2021    Procedure: COLONOSCOPY;  Surgeon: Chon Casper MD;  Location: [facility] (4TH FLR);  Service: Endoscopy;  Laterality: N/A;  Patient with his second poor bowel pre and has poor prep today.  If patient not intersted or can't get colonoscopy tomorrow will need constipation bowel prep and will need to restart his Eliquis today.Thanks,Chon     per Dr Blunt-ok to hold Eliquis 2 days prior      COVID     CORONARY ANGIOPLASTY WITH STENT PLACEMENT  9/13/2006    KIDNEY TRANSPLANT  2005    PARATHYROIDECTOMY      TREATMENT OF CARDIAC ARRHYTHMIA N/A 3/28/2022    " Procedure: CARDIOVERSION;  Surgeon: Migue Blunt MD;  Location: [facility] EP LAB;  Service: Cardiology;  Laterality: N/A;  AF, DCC, MAC, GP, 3 PREP*RODRIGO deferred pt 100% compliant*       Family History:  Family History   Problem Relation Age of Onset    No Known Problems Sister     No Known Problems Brother     Thyroid disease Mother         s/p surgery    Heart disease Father         had pacemaker    No Known Problems Sister     Kidney failure Sister     Kidney disease Sister     No Known Problems Sister     Kidney disease Brother     Kidney failure Brother         s/p transplant    Diabetes Mellitus Neg Hx     Stroke Neg Hx     Heart attack Neg Hx     Cancer Neg Hx     Celiac disease Neg Hx     Cirrhosis Neg Hx     Colon cancer Neg Hx     Esophageal cancer Neg Hx     Inflammatory bowel disease Neg Hx     Rectal cancer Neg Hx     Stomach cancer Neg Hx     Ulcerative colitis Neg Hx     Liver disease Neg Hx     Liver cancer Neg Hx     Crohn's disease Neg Hx     Melanoma Neg Hx        Social History:  He  reports that he quit smoking about 10 months ago. His smoking use included cigarettes. He has a 20.00 pack-year smoking history. He has never used smokeless tobacco. He reports that he does not drink alcohol and does not use drugs.      MEDICATIONS & ALLERGIES:     Review of patient's allergies indicates:   Allergen Reactions    Ace inhibitors Swelling    Verapamil Other (See Comments)     Other reaction(s): Unknown    Povidone-iodine Itching       Current Outpatient Medications on File Prior to Visit   Medication Sig Dispense Refill    acetaminophen (TYLENOL) 500 MG tablet Take 1 tablet (500 mg total) by mouth every 6 (six) hours as needed for Pain. 20 tablet 0    albuterol (VENTOLIN HFA) 90 mcg/actuation inhaler Inhale 2 puffs into the lungs every 6 (six) hours as needed for Wheezing or Shortness of Breath. Rescue 18 g 3    ammonium lactate 12 % Crea Apply 1 g topically once daily. 140 g 1    aspirin (ECOTRIN) 81 MG  EC tablet Take 1 tablet by mouth Daily.      atorvastatin (LIPITOR) 10 MG tablet Take 10 mg by mouth.      calcium carbonate (CALCIUM 600 ORAL) Take by mouth. Pt taking 1200 daily      clonazePAM (KLONOPIN) 0.5 MG tablet Take 1 tablet (0.5 mg total) by mouth 2 (two) times daily as needed for Anxiety. (Patient not taking: Reported on 1/5/2023) 60 tablet 0    colchicine (MITIGARE) 0.6 mg Cap One po BID prn gout flare up to 3 days 15 capsule 11    cyclobenzaprine (FLEXERIL) 5 MG tablet Take 1 tablet (5 mg total) by mouth 2 (two) times daily as needed for Muscle spasms. 28 tablet 1    doxazosin (CARDURA) 4 MG tablet TAKE 1 TABLET (4 MG TOTAL) BY MOUTH EVERY 12 (TWELVE) HOURS. 180 tablet 3    ELIQUIS 5 mg Tab TAKE 1 TABLET BY MOUTH TWICE A  tablet 3    famotidine (PEPCID) 20 MG tablet Take 1 tablet (20 mg total) by mouth 2 (two) times daily. 180 tablet 3    fexofenadine (ALLEGRA) 60 MG tablet Take 1 tablet by mouth Twice daily as needed.      flecainide (TAMBOCOR) 50 MG Tab Take 1 tablet (50 mg total) by mouth every 12 (twelve) hours. 180 tablet 3    FLUAD QUAD 2022-23,65Y UP,,PF, 60 mcg (15 mcg x 4)/0.5 mL Syrg       fluticasone propion-salmeterol 115-21 mcg/dose (ADVAIR HFA) 115-21 mcg/actuation HFAA inhaler Inhale 2 puffs into the lungs every 12 (twelve) hours. Controller 12 g 5    fluticasone propionate (FLONASE) 50 mcg/actuation nasal spray 1 spray (50 mcg total) by Each Nostril route once daily. 18.2 mL 0    furosemide (LASIX) 40 MG tablet Take 1 tablet (40 mg total) by mouth 2 (two) times daily. 10 tablet 0    gabapentin (NEURONTIN) 300 MG capsule TAKE 1 CAPSULE BY MOUTH TWICE A  capsule 2    hydrALAZINE (APRESOLINE) 50 MG tablet TAKE 1 TABLET (50 MG TOTAL) BY MOUTH 3 (THREE) TIMES DAILY. 270 tablet 3    KLOR-CON 10 10 mEq TbSR TAKE 1 TABLET BY MOUTH EVERY DAY 90 tablet 4    LIDOcaine (LIDODERM) 5 % Place 1 patch onto the skin daily as needed (back pain). Remove & Discard patch within 12 hours or as  "directed by MD 15 patch 0    metoprolol succinate (TOPROL-XL) 25 MG 24 hr tablet Take 1 tablet (25 mg total) by mouth once daily. 90 tablet 3    multivitamin (THERAGRAN) per tablet Take 1 tablet by mouth Daily.      mycophenolate (CELLCEPT) 250 mg Cap Take 2 capsules (500 mg total) by mouth 2 (two) times daily. 360 capsule 3    NIFEdipine (PROCARDIA-XL) 60 MG (OSM) 24 hr tablet Take 1 tablet (60 mg total) by mouth 2 (two) times a day. 14 tablet 0    omeprazole (PRILOSEC) 20 MG capsule Take 1 capsule (20 mg total) by mouth once daily. 14 capsule 0    paricalcitoL (ZEMPLAR) 1 MCG capsule TAKE 1 CAPSULE ON MONDAY, WEDNESDAY AND FRIDAY 36 capsule 3    PFIZER COVID BIVAL,12Y UP,,PF, 30 mcg/0.3 mL injection       predniSONE (DELTASONE) 5 MG tablet TAKE 1 TABLET BY MOUTH EVERY DAY IN THE MORNING 90 tablet 3    spironolactone (ALDACTONE) 25 MG tablet TAKE 1 TABLET EVERY DAY 90 tablet 3    tacrolimus (PROGRAF) 1 MG Cap Two po in am and two po in pm 270 capsule 3    vitamin D 1000 units Tab Take 370 mg by mouth 2 (two) times daily. 2 tablets BID       No current facility-administered medications on file prior to visit.        OBJECTIVE:     Vital Signs:  BP (!) 194/83   Pulse (!) 58   Ht 6' 1" (1.854 m)   Wt 105.4 kg (232 lb 5.8 oz)   BMI 30.66 kg/m²     Physical Exam:  Physical Exam  Constitutional:       General: He is not in acute distress.     Appearance: Normal appearance. He is obese. He is not ill-appearing or diaphoretic.      Comments: Well-appearing man in NAD.   HENT:      Head: Normocephalic and atraumatic.      Comments: Mask worn in clinic in the setting of COVID precautions.   Eyes:      Pupils: Pupils are equal, round, and reactive to light.   Cardiovascular:      Rate and Rhythm: Regular rhythm. Bradycardia present.      Pulses: Normal pulses.      Heart sounds: Normal heart sounds. No murmur heard.    No friction rub. No gallop.   Pulmonary:      Effort: Pulmonary effort is normal. No respiratory " distress.      Breath sounds: Normal breath sounds. No wheezing, rhonchi or rales.   Chest:      Chest wall: No tenderness.   Abdominal:      General: There is no distension.      Palpations: Abdomen is soft.      Tenderness: There is no abdominal tenderness.   Musculoskeletal:         General: No swelling or tenderness.      Cervical back: Normal range of motion.      Right lower leg: Edema present.      Left lower leg: Edema present.      Comments: Trace bilateral pedal edema, wearing compression stockings in clinic.    Skin:     General: Skin is warm and dry.      Findings: No erythema, lesion or rash.   Neurological:      General: No focal deficit present.      Mental Status: He is alert and oriented to person, place, and time. Mental status is at baseline.      Motor: No weakness.      Gait: Gait normal.   Psychiatric:         Mood and Affect: Mood normal.         Behavior: Behavior normal.        Laboratory Data:  Lab Results   Component Value Date    WBC 6.73 01/04/2023    HGB 9.7 (L) 01/04/2023    HCT 32.8 (L) 01/04/2023    MCV 82 01/04/2023     01/04/2023     Lab Results   Component Value Date    GLU 89 01/04/2023     01/04/2023    K 4.3 01/04/2023     01/04/2023    CO2 21 (L) 01/04/2023    BUN 31 (H) 01/04/2023    CREATININE 2.6 (H) 01/04/2023    CALCIUM 7.7 (L) 01/04/2023    MG 2.2 11/03/2022     Lab Results   Component Value Date    INR 1.1 03/21/2022    INR 1.2 06/17/2020    INR 1.2 06/17/2020       Pertinent Cardiac Data:  ECG: Sinus bradycardia with rate of 58 bpm,  ms, LVH with repolarization changes  ms, QT/QTc 460/451 ms.     Resting 2D Transthoracic Echocardiogram - 3/23/2020:  Concentric left ventricular remodeling.  Normal left ventricular systolic function. The estimated ejection fraction is 60%.  Normal right ventricular systolic function.  Normal LV diastolic function.  Aortic sclerosis without significant stenosis.  Intermediate central venous pressure (8  mmHg).    Pharmacologic Nuclear Cardiac Stress Test - SPECT - 6/29/2020:    The perfusion scan is free of evidence from myocardial ischemia or injury.    Gated perfusion images showed an ejection fraction of 63% at rest and 67% post stress. Normal ejection fraction is greater than 51%.    There is normal wall motion at rest and post stress.    LV cavity size is normal at rest and normal at stress.    The EKG portion of this study is abnormal but not diagnostic.    The patient reported no chest pain during the stress test.    Arrhythmias during stress: rare PACs, occasional PVCs.    There are no prior studies for comparison.    Resting 2D Transthoracic Echocardiogram - 3/21/2022:  Moderate left atrial enlargement.  The left ventricle is normal in size with concentric remodeling and normal systolic function.  The estimated ejection fraction is 55%.  There are segmental left ventricular wall motion abnormalities : akinetic basal inferior wall.  Indeterminate left ventricular diastolic function.  Mild right atrial enlargement.  Normal right ventricular size with normal right ventricular systolic function.  There is mild aortic valve stenosis.  Aortic valve area is 1.84 cm2; peak velocity is 2.60 m/s; mean gradient is 13 mmHg.  Mild aortic regurgitation.  Mild pulmonic regurgitation.  The estimated PA systolic pressure is 25 mmHg.  Normal central venous pressure (3 mmHg).    Pharmacologic Nuclear Cardiac Stress Test - SPECT - 4/28/2022:    Normal myocardial perfusion scan. There is no evidence of myocardial ischemia or infarction.    The visually estimated ejection fraction is normal at rest and normal during stress.    There is normal wall motion at rest and post stress.    LV cavity size is normal at rest and normal at stress.    The EKG portion of this study is abnormal but not diagnostic.    The patient reported no chest pain during the stress test.    There were no arrhythmias during stress.    When compared to the  previous study from 6/29/2020, there are no significant changes.      ASSESSMENT & PLAN:   Mr. Ibrahima Phelps is a darshan 67-year-old gentleman who returns to clinic today for ongoing evaluation and recommendations regarding paroxysmal atrial fibrillation with a recent episode of musculoskeletal chest pain. He has a past medical history significant for paroxysmal atrial fibrillation, heart failure with preserved ejection fraction with most recent LVEF 55%, coronary artery disease with PCI in 2006, hypertension, hyperlipidemia, aortic atherosclerosis, glucose intolerance, CKD stage IV with a history of prior renal transplant x2 in 2002 and 2005 with chronic immunosuppressive medication, secondary hyperparathyroidism, diverticulosis, COPD, GERD, hypothyroidism, gout, a history of tobacco use, and obesity.     - We discussed the pathophysiology of atrial fibrillation; specifically, we discussed paroxysmal atrial fibrillation and the concept that some patients may experience paroxysms of atrial fibrillation interrupting periods of sinus rhythm. We reviewed that there is an increased risk of CVA with atrial fibrillation.  - He remains on flecainide 50mg po BID with excellent control of his atrial fibrillation. He remains in sinus rhythm today and reports that he may have had two brief episodes of palpitations over the prior year. We reviewed the results of his prior pharmacologic nuclear stress SPECT that revealed no evidence of ischemia, with no evidence of structural heart disease appreciated on echocardiogram.   - Flecainide displays use-dependency, and he remains on metoprolol succinate 25mg po daily.  - His [id number] is 4 (HFpEF, HTN, aortic atherosclerosis, male gender, age 65-74), portending an annual adjusted risk of CVA of 4%. He remains on uninterrupted apixaban therapy with no bleeding events reported.   - We discussed the possibility of catheter ablation with pulmonary vein isolation should he continue to  have symptoms and AF despite AAD therapy. He voices understanding, although he would like to remain on rhythm control with medication at this time.   - Possible underlying drivers of atrial fibrillation were addressed at this appointment, including recommendations for weight loss - now a class I recommendation. Review of laboratory data revealed stable and acceptable TSH at 1.212. A request for a sleep study was deferred by the patient today.       This patient will return to clinic with me in six months with continued evaluation with his nephrologist, PCP, and general cardiology team in the interim. All questions and concerns were addressed at this encounter.     Signing Physician:       CHARLEY Parmar MD  Electrophysiology Attending

## 2023-01-09 ENCOUNTER — OFFICE VISIT (OUTPATIENT)
Dept: GASTROENTEROLOGY | Facility: CLINIC | Age: 68
End: 2023-01-09
Payer: MEDICARE

## 2023-01-09 VITALS
HEART RATE: 69 BPM | BODY MASS INDEX: 30.27 KG/M2 | DIASTOLIC BLOOD PRESSURE: 81 MMHG | WEIGHT: 228.38 LBS | HEIGHT: 73 IN | SYSTOLIC BLOOD PRESSURE: 156 MMHG

## 2023-01-09 DIAGNOSIS — R10.9 ABDOMINAL CRAMPING: ICD-10-CM

## 2023-01-09 DIAGNOSIS — R10.84 GENERALIZED ABDOMINAL PAIN: ICD-10-CM

## 2023-01-09 PROCEDURE — 99999 PR PBB SHADOW E&M-EST. PATIENT-LVL V: CPT | Mod: PBBFAC,GC,, | Performed by: STUDENT IN AN ORGANIZED HEALTH CARE EDUCATION/TRAINING PROGRAM

## 2023-01-09 PROCEDURE — 3288F FALL RISK ASSESSMENT DOCD: CPT | Mod: CPTII,GC,S$GLB, | Performed by: STUDENT IN AN ORGANIZED HEALTH CARE EDUCATION/TRAINING PROGRAM

## 2023-01-09 PROCEDURE — 3008F BODY MASS INDEX DOCD: CPT | Mod: CPTII,GC,S$GLB, | Performed by: STUDENT IN AN ORGANIZED HEALTH CARE EDUCATION/TRAINING PROGRAM

## 2023-01-09 PROCEDURE — 3079F PR MOST RECENT DIASTOLIC BLOOD PRESSURE 80-89 MM HG: ICD-10-PCS | Mod: CPTII,GC,S$GLB, | Performed by: STUDENT IN AN ORGANIZED HEALTH CARE EDUCATION/TRAINING PROGRAM

## 2023-01-09 PROCEDURE — 3079F DIAST BP 80-89 MM HG: CPT | Mod: CPTII,GC,S$GLB, | Performed by: STUDENT IN AN ORGANIZED HEALTH CARE EDUCATION/TRAINING PROGRAM

## 2023-01-09 PROCEDURE — 3066F PR DOCUMENTATION OF TREATMENT FOR NEPHROPATHY: ICD-10-PCS | Mod: CPTII,GC,S$GLB, | Performed by: STUDENT IN AN ORGANIZED HEALTH CARE EDUCATION/TRAINING PROGRAM

## 2023-01-09 PROCEDURE — 1101F PR PT FALLS ASSESS DOC 0-1 FALLS W/OUT INJ PAST YR: ICD-10-PCS | Mod: CPTII,GC,S$GLB, | Performed by: STUDENT IN AN ORGANIZED HEALTH CARE EDUCATION/TRAINING PROGRAM

## 2023-01-09 PROCEDURE — 3044F HG A1C LEVEL LT 7.0%: CPT | Mod: CPTII,GC,S$GLB, | Performed by: STUDENT IN AN ORGANIZED HEALTH CARE EDUCATION/TRAINING PROGRAM

## 2023-01-09 PROCEDURE — 3288F PR FALLS RISK ASSESSMENT DOCUMENTED: ICD-10-PCS | Mod: CPTII,GC,S$GLB, | Performed by: STUDENT IN AN ORGANIZED HEALTH CARE EDUCATION/TRAINING PROGRAM

## 2023-01-09 PROCEDURE — 3066F NEPHROPATHY DOC TX: CPT | Mod: CPTII,GC,S$GLB, | Performed by: STUDENT IN AN ORGANIZED HEALTH CARE EDUCATION/TRAINING PROGRAM

## 2023-01-09 PROCEDURE — 1101F PT FALLS ASSESS-DOCD LE1/YR: CPT | Mod: CPTII,GC,S$GLB, | Performed by: STUDENT IN AN ORGANIZED HEALTH CARE EDUCATION/TRAINING PROGRAM

## 2023-01-09 PROCEDURE — 3077F SYST BP >= 140 MM HG: CPT | Mod: CPTII,GC,S$GLB, | Performed by: STUDENT IN AN ORGANIZED HEALTH CARE EDUCATION/TRAINING PROGRAM

## 2023-01-09 PROCEDURE — 99999 PR PBB SHADOW E&M-EST. PATIENT-LVL V: ICD-10-PCS | Mod: PBBFAC,GC,, | Performed by: STUDENT IN AN ORGANIZED HEALTH CARE EDUCATION/TRAINING PROGRAM

## 2023-01-09 PROCEDURE — 1159F PR MEDICATION LIST DOCUMENTED IN MEDICAL RECORD: ICD-10-PCS | Mod: CPTII,GC,S$GLB, | Performed by: STUDENT IN AN ORGANIZED HEALTH CARE EDUCATION/TRAINING PROGRAM

## 2023-01-09 PROCEDURE — 1159F MED LIST DOCD IN RCRD: CPT | Mod: CPTII,GC,S$GLB, | Performed by: STUDENT IN AN ORGANIZED HEALTH CARE EDUCATION/TRAINING PROGRAM

## 2023-01-09 PROCEDURE — 99214 OFFICE O/P EST MOD 30 MIN: CPT | Mod: GC,S$GLB,, | Performed by: STUDENT IN AN ORGANIZED HEALTH CARE EDUCATION/TRAINING PROGRAM

## 2023-01-09 PROCEDURE — 3008F PR BODY MASS INDEX (BMI) DOCUMENTED: ICD-10-PCS | Mod: CPTII,GC,S$GLB, | Performed by: STUDENT IN AN ORGANIZED HEALTH CARE EDUCATION/TRAINING PROGRAM

## 2023-01-09 PROCEDURE — 99214 PR OFFICE/OUTPT VISIT, EST, LEVL IV, 30-39 MIN: ICD-10-PCS | Mod: GC,S$GLB,, | Performed by: STUDENT IN AN ORGANIZED HEALTH CARE EDUCATION/TRAINING PROGRAM

## 2023-01-09 PROCEDURE — 3044F PR MOST RECENT HEMOGLOBIN A1C LEVEL <7.0%: ICD-10-PCS | Mod: CPTII,GC,S$GLB, | Performed by: STUDENT IN AN ORGANIZED HEALTH CARE EDUCATION/TRAINING PROGRAM

## 2023-01-09 PROCEDURE — 3077F PR MOST RECENT SYSTOLIC BLOOD PRESSURE >= 140 MM HG: ICD-10-PCS | Mod: CPTII,GC,S$GLB, | Performed by: STUDENT IN AN ORGANIZED HEALTH CARE EDUCATION/TRAINING PROGRAM

## 2023-01-09 RX ORDER — HYOSCYAMINE SULFATE 0.12 MG/1
0.12 TABLET SUBLINGUAL EVERY 4 HOURS PRN
Qty: 90 TABLET | Refills: 0 | Status: ON HOLD | OUTPATIENT
Start: 2023-01-09 | End: 2023-03-06 | Stop reason: HOSPADM

## 2023-01-09 NOTE — PROGRESS NOTES
[facility]    Reason for visit: Diagnoses of Generalized abdominal pain and Abdominal cramping were pertinent to this visit.  Referring Provider/PCP: Connie Magdaleno MD    History of Present Illness:  Ibrahima Phelps is a 67 y.o. male with a history of kidney transplant (on tacro, MMF, prednisone) HTN, HLD, CAD, paroxysmal Afib (on eliquis) who is presenting for initial evaluation of abdominal pain. Describes bilateral periumbilical, sharp cramping abdominal pain starting in Nov 2022. Associated with some constipation at the time. Was present for a few days before going to ED twice within 3 days in Nov 2022. CT and labs were unremarkable. Was given PPI, carafate, and levsin. Reports the levsin helped dramatically and helped pain resolve the next day. Also noted he had some loose stools when pain did resolve. Now back at baseline daily, formed stools. Not taking PPI or carafate. Has 1-2 pills left levsin but has not needed them as pain has not recurred since. Denies blood in stool. Denies heartburn, regurgitation. Takes daily H2 blocker for years. Denies any prior episodes of abdominal pain.    Had abnormal MRI/MRCP in 2014, done for abdominal pain, showing indeterminate intraluminal lesion within duodenum in region of ampulla with mild dilation of duodenum proximally. EGD/EUS was recommended but patient declined. He has since had CT A/P w contrast in 11/2021 that showed normal biliary ducts, pancreas, unremarkable small bowel.      PEndoHx:  Colonoscopy 03/2021  Impression:           - The examined portion of the ileum was normal.                         - Non-bleeding internal hemorrhoids.                         - Diverticulosis in the recto-sigmoid colon, in the                         sigmoid colon, in the descending colon, in the                         transverse colon and in the ascending colon.                         - Erythematous mucosa in the recto-sigmoid colon,                          in the sigmoid colon and in the descending colon.                         - No specimens collected.     EGD 2008 - medium sized hiatal hernia            Review of Systems   Constitutional:  Negative for chills and fever.   HENT:  Negative for congestion and sore throat.    Eyes:  Negative for blurred vision and double vision.   Respiratory:  Negative for cough and shortness of breath.    Cardiovascular:  Negative for chest pain and palpitations.   Gastrointestinal:         See HPI   Genitourinary:  Negative for frequency and urgency.   Musculoskeletal:  Negative for joint pain and myalgias.   Skin:  Negative for itching and rash.   Neurological:  Negative for sensory change and focal weakness.     Medical History:  Past Medical History:   Diagnosis Date    (HFpEF) heart failure with preserved ejection fraction 9/25/2017    Atrial fibrillation     CAD (coronary artery disease)     CHF (congestive heart failure)     Chronic diastolic heart failure 4/8/2020    CKD (chronic kidney disease) stage 3, GFR 30-59 ml/min     Dr. Latif    CKD (chronic kidney disease) stage 3, GFR 30-59 ml/min     Diverticulosis     Fever blister     Heart attack 2006    Hyperlipidemia     Hypertension     Immunodeficiency due to treatment with immunosuppressive medication     Keloid cicatrix     Left lower quadrant abdominal pain 6/5/2022    Metabolic bone disease     Pericarditis     S/P kidney transplant     Supraventricular tachycardia 06/2019    Thrombocytopenia     Thyroid disease     Tobacco use 9/5/2014    Unspecified disorder of kidney and ureter     Stage IIICKD       Past Surgical History:   Procedure Laterality Date    CARDIOVERSION  04/30/15    CHOLECYSTECTOMY      COLONOSCOPY  April 20, 2011    Diverticulosis, repeat recommended in 3 yrs., repeat colonoscopy 2014 revealed 2 polyps.  He should return in 5 years.    COLONOSCOPY N/A 3/13/2020    Procedure: COLONOSCOPY;  Surgeon: Chon Casper MD;  Location: [address]  "[facility]);  Service: Endoscopy;  Laterality: N/A;  ok to hold coumadin x5days-see telephone encounter 2/4/20-tb    COLONOSCOPY N/A 2/4/2021    Procedure: COLONOSCOPY;  Surgeon: Chon Casper MD;  Location: [facility] ([address] [place] [address]);  Service: Endoscopy;  Laterality: N/A;  Eliquis - per Dr. GAVIN Blunt ok to hold x 2 days and "restarted asap"- ERW  last prep "poor", [id number] ordered/ Pt requests Dr. Casper  prep ins. mailed - COVID screening on 2/1/21 PCW- ERW    COLONOSCOPY N/A 3/3/2021    Procedure: COLONOSCOPY;  Surgeon: Chon Casper MD;  Location: [facility] ([address] [place] [address]);  Service: Endoscopy;  Laterality: N/A;  Patient with his second poor bowel pre and has poor prep today.  If patient not intersted or can't get colonoscopy tomorrow will need constipation bowel prep and will need to restart his Eliquis today.Thanks,Chon     per Dr Blunt-ok to hold Eliquis 2 days prior      COVID     CORONARY ANGIOPLASTY WITH STENT PLACEMENT  9/13/2006    KIDNEY TRANSPLANT  2005    PARATHYROIDECTOMY      TREATMENT OF CARDIAC ARRHYTHMIA N/A 3/28/2022    Procedure: CARDIOVERSION;  Surgeon: Migue Blunt MD;  Location: [facility];  Service: Cardiology;  Laterality: N/A;  AF, DCC, MAC, GP, 3 PREP*RODRIGO deferred pt 100% compliant*       Family History   Problem Relation Age of Onset    No Known Problems Sister     No Known Problems Brother     Thyroid disease Mother         s/p surgery    Heart disease Father         had pacemaker    No Known Problems Sister     Kidney failure Sister     Kidney disease Sister     No Known Problems Sister     Kidney disease Brother     Kidney failure Brother         s/p transplant    Diabetes Mellitus Neg Hx     Stroke Neg Hx     Heart attack Neg Hx     Cancer Neg Hx     Celiac disease Neg Hx     Cirrhosis Neg Hx     Colon cancer Neg Hx     Esophageal cancer Neg Hx     Inflammatory bowel disease Neg Hx     Rectal cancer Neg Hx     Stomach cancer Neg Hx     Ulcerative colitis Neg Hx     Liver disease Neg Hx     " Liver cancer Neg Hx     Crohn's disease Neg Hx     Melanoma Neg Hx        Social History     Socioeconomic History    Marital status:    Occupational History    Occupation:    Tobacco Use    Smoking status: Former     Packs/day: 0.50     Years: 40.00     Pack years: 20.00     Types: Cigarettes     Quit date: 2022     Years since quittin.8    Smokeless tobacco: Never    Tobacco comments:     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     Patient is currently retired   Substance and Sexual Activity    Alcohol use: No    Drug use: No    Sexual activity: Yes     Partners: Female   Social History Narrative    Retired        Current Outpatient Medications on File Prior to Visit   Medication Sig Dispense Refill    acetaminophen (TYLENOL) 500 MG tablet Take 1 tablet (500 mg total) by mouth every 6 (six) hours as needed for Pain. 20 tablet 0    albuterol (VENTOLIN HFA) 90 mcg/actuation inhaler Inhale 2 puffs into the lungs every 6 (six) hours as needed for Wheezing or Shortness of Breath. Rescue 18 g 3    ammonium lactate 12 % Crea Apply 1 g topically once daily. 140 g 1    aspirin (ECOTRIN) 81 MG EC tablet Take 1 tablet by mouth Daily.      atorvastatin (LIPITOR) 10 MG tablet Take 10 mg by mouth.      calcium carbonate (CALCIUM 600 ORAL) Take by mouth. Pt taking 1200 daily      colchicine (MITIGARE) 0.6 mg Cap One po BID prn gout flare up to 3 days 15 capsule 11    cyclobenzaprine (FLEXERIL) 5 MG tablet Take 1 tablet (5 mg total) by mouth 2 (two) times daily as needed for Muscle spasms. 28 tablet 1    doxazosin (CARDURA) 4 MG tablet TAKE 1 TABLET (4 MG TOTAL) BY MOUTH EVERY 12 (TWELVE) HOURS. 180 tablet 3    ELIQUIS 5 mg Tab TAKE 1 TABLET BY MOUTH TWICE A  tablet 3    famotidine (PEPCID) 20 MG tablet Take 1 tablet (20 mg total) by mouth 2 (two) times daily. 180 tablet 3    fexofenadine (ALLEGRA) 60 MG tablet Take 1 tablet by mouth Twice daily  as needed.      flecainide (TAMBOCOR) 50 MG Tab Take 1 tablet (50 mg total) by mouth every 12 (twelve) hours. 180 tablet 3    fluticasone propionate (FLONASE) 50 mcg/actuation nasal spray 1 spray (50 mcg total) by Each Nostril route once daily. 18.2 mL 0    furosemide (LASIX) 40 MG tablet Take 1 tablet (40 mg total) by mouth 2 (two) times daily. 10 tablet 0    gabapentin (NEURONTIN) 300 MG capsule TAKE 1 CAPSULE BY MOUTH TWICE A  capsule 2    hydrALAZINE (APRESOLINE) 50 MG tablet TAKE 1 TABLET (50 MG TOTAL) BY MOUTH 3 (THREE) TIMES DAILY. 270 tablet 3    LIDOcaine (LIDODERM) 5 % Place 1 patch onto the skin daily as needed (back pain). Remove & Discard patch within 12 hours or as directed by MD 15 patch 0    metoprolol succinate (TOPROL-XL) 25 MG 24 hr tablet Take 1 tablet (25 mg total) by mouth once daily. 90 tablet 3    multivitamin (THERAGRAN) per tablet Take 1 tablet by mouth Daily.      mycophenolate (CELLCEPT) 250 mg Cap Take 2 capsules (500 mg total) by mouth 2 (two) times daily. 360 capsule 3    NIFEdipine (PROCARDIA-XL) 60 MG (OSM) 24 hr tablet Take 1 tablet (60 mg total) by mouth 2 (two) times a day. 14 tablet 0    omeprazole (PRILOSEC) 20 MG capsule Take 1 capsule (20 mg total) by mouth once daily. 14 capsule 0    paricalcitoL (ZEMPLAR) 1 MCG capsule TAKE 1 CAPSULE ON MONDAY, WEDNESDAY AND FRIDAY 36 capsule 3    PFIZER COVID BIVAL,12Y UP,,PF, 30 mcg/0.3 mL injection       predniSONE (DELTASONE) 5 MG tablet TAKE 1 TABLET BY MOUTH EVERY DAY IN THE MORNING 90 tablet 3    spironolactone (ALDACTONE) 25 MG tablet TAKE 1 TABLET EVERY DAY 90 tablet 3    tacrolimus (PROGRAF) 1 MG Cap Two po in am and two po in pm 270 capsule 3    vitamin D 1000 units Tab Take 370 mg by mouth 2 (two) times daily. 2 tablets BID      clonazePAM (KLONOPIN) 0.5 MG tablet Take 1 tablet (0.5 mg total) by mouth 2 (two) times daily as needed for Anxiety. (Patient not taking: Reported on 1/5/2023) 60 tablet 0    FLUAD QUAD 2022-23,65Y  UP,,PF, 60 mcg (15 mcg x 4)/0.5 mL Syrg       fluticasone propion-salmeterol 115-21 mcg/dose (ADVAIR HFA) 115-21 mcg/actuation HFAA inhaler Inhale 2 puffs into the lungs every 12 (twelve) hours. Controller 12 g 5    KLOR-CON 10 10 mEq TbSR TAKE 1 TABLET BY MOUTH EVERY DAY 90 tablet 4     No current facility-administered medications on file prior to visit.       Review of patient's allergies indicates:   Allergen Reactions    Ace inhibitors Swelling    Verapamil Other (See Comments)     Other reaction(s): Unknown    Povidone-iodine Itching       Physical Exam:  Constitutional:  not in acute distress and well developed  HENT: Head: Normal, normocephalic, atraumatic.  Eyes: conjunctiva clear and sclera nonicteric  Cardiovascular: regular rate and rhythm and no murmur  Respiratory: normal chest expansion & respiratory effort   and no accessory muscle use  GI: soft, non-tender, without masses or organomegaly  Musculoskeletal: no muscular tenderness noted  Skin: normal color  Neurological: alert, oriented x3  Psychiatric: mood and affect are within normal limits, pt is a good historian; no memory problems were noted    Laboratory:  Reviewed labs ordered by another provider: CBC, CMP, lipase, iron studies. Notable for baseline Cr, normal LFTs, chronic normocytic anemia. Iron studies c/w AoCD/inflammation.    Imaging:  Imaging reviewed: CT A/P w contrast. Interpretation: no acute abnormalities, normal pancreas, normal bile ducts, small hiatal hernia, colonic diverticulosis          Assessment:  Ibrahima Phelps is a 67 y.o. male who is presenting for initial evaluation of acute periumbilical abdominal pain, since resolved with levsin, did have some association with bowel habit changes. No prior history of IBS but may be playing a role. Up to date on colonoscopy. Noted that he did have abnormal MRI/MRCP in 2014 showing possible intraluminal duodenal lesion near ampulla, EGD/EUS recommended at that time but declined. Not seen on  recent CT. LFTs have been wnl. Will evaluate with EGD as patient is now amenable.    Problems:  Abdominal pain, resolved  Abnormal MRI/MRCP (2014)      Plan:  Continue levsin 0.125 SL q4h prn  Schedule for EGD to evaluate for possible duodenal/ampullary lesion. If abnormal, may require EUS at later date.  Follow up if symptoms worsen or fail to improve.    Ruy Lyons MD  Gastroenterology Fellow    Orders Placed This Encounter   Procedures    Ambulatory referral/consult to Endo Procedure

## 2023-01-12 ENCOUNTER — HOSPITAL ENCOUNTER (EMERGENCY)
Facility: HOSPITAL | Age: 68
Discharge: HOME | End: 2023-01-12
Attending: EMERGENCY MEDICINE
Payer: MEDICARE

## 2023-01-12 VITALS
HEIGHT: 73 IN | DIASTOLIC BLOOD PRESSURE: 67 MMHG | HEART RATE: 66 BPM | SYSTOLIC BLOOD PRESSURE: 151 MMHG | BODY MASS INDEX: 30.27 KG/M2 | TEMPERATURE: 99 F | RESPIRATION RATE: 22 BRPM | WEIGHT: 228.38 LBS | OXYGEN SATURATION: 97 %

## 2023-01-12 DIAGNOSIS — U07.1 COVID-19 VIRUS INFECTION: Primary | ICD-10-CM

## 2023-01-12 DIAGNOSIS — U07.1 COVID-19 VIRUS DETECTED: ICD-10-CM

## 2023-01-12 DIAGNOSIS — R09.81 NASAL CONGESTION WITH RHINORRHEA: ICD-10-CM

## 2023-01-12 DIAGNOSIS — R06.02 SHORTNESS OF BREATH: ICD-10-CM

## 2023-01-12 DIAGNOSIS — J06.9 VIRAL UPPER RESPIRATORY TRACT INFECTION: ICD-10-CM

## 2023-01-12 DIAGNOSIS — J34.89 NASAL CONGESTION WITH RHINORRHEA: ICD-10-CM

## 2023-01-12 LAB
ALBUMIN SERPL BCP-MCNC: 3.1 G/DL (ref 3.5–5.2)
ALBUMIN SERPL BCP-MCNC: 3.1 G/DL (ref 3.5–5.2)
ALP SERPL-CCNC: 69 U/L (ref 55–135)
ALT SERPL W/O P-5'-P-CCNC: 17 U/L (ref 10–44)
ANION GAP SERPL CALC-SCNC: 10 MMOL/L (ref 8–16)
ANION GAP SERPL CALC-SCNC: 10 MMOL/L (ref 8–16)
AST SERPL-CCNC: 21 U/L (ref 10–40)
BASOPHILS # BLD AUTO: 0.01 K/UL (ref 0–0.2)
BASOPHILS NFR BLD: 0.2 % (ref 0–1.9)
BILIRUB SERPL-MCNC: 0.3 MG/DL (ref 0.1–1)
BNP SERPL-MCNC: 619 PG/ML (ref 0–99)
BUN SERPL-MCNC: 26 MG/DL (ref 8–23)
BUN SERPL-MCNC: 26 MG/DL (ref 8–23)
CALCIUM SERPL-MCNC: 7.5 MG/DL (ref 8.7–10.5)
CALCIUM SERPL-MCNC: 7.5 MG/DL (ref 8.7–10.5)
CHLORIDE SERPL-SCNC: 105 MMOL/L (ref 95–110)
CHLORIDE SERPL-SCNC: 105 MMOL/L (ref 95–110)
CO2 SERPL-SCNC: 22 MMOL/L (ref 23–29)
CO2 SERPL-SCNC: 22 MMOL/L (ref 23–29)
CREAT SERPL-MCNC: 2.8 MG/DL (ref 0.5–1.4)
CREAT SERPL-MCNC: 2.8 MG/DL (ref 0.5–1.4)
DIFFERENTIAL METHOD: ABNORMAL
EOSINOPHIL # BLD AUTO: 0 K/UL (ref 0–0.5)
EOSINOPHIL NFR BLD: 0.3 % (ref 0–8)
ERYTHROCYTE [DISTWIDTH] IN BLOOD BY AUTOMATED COUNT: 15 % (ref 11.5–14.5)
EST. GFR  (NO RACE VARIABLE): 23.8 ML/MIN/1.73 M^2
EST. GFR  (NO RACE VARIABLE): 23.8 ML/MIN/1.73 M^2
GLUCOSE SERPL-MCNC: 102 MG/DL (ref 70–110)
GLUCOSE SERPL-MCNC: 102 MG/DL (ref 70–110)
HCT VFR BLD AUTO: 30.8 % (ref 40–54)
HCV AB SERPL QL IA: NORMAL
HGB BLD-MCNC: 9.1 G/DL (ref 14–18)
IMM GRANULOCYTES # BLD AUTO: 0.07 K/UL (ref 0–0.04)
IMM GRANULOCYTES NFR BLD AUTO: 1.1 % (ref 0–0.5)
INFLUENZA A, MOLECULAR: NOT DETECTED
INFLUENZA B, MOLECULAR: NOT DETECTED
LYMPHOCYTES # BLD AUTO: 0.5 K/UL (ref 1–4.8)
LYMPHOCYTES NFR BLD: 7.6 % (ref 18–48)
MCH RBC QN AUTO: 23.9 PG (ref 27–31)
MCHC RBC AUTO-ENTMCNC: 29.5 G/DL (ref 32–36)
MCV RBC AUTO: 81 FL (ref 82–98)
MONOCYTES # BLD AUTO: 1.1 K/UL (ref 0.3–1)
MONOCYTES NFR BLD: 16.8 % (ref 4–15)
NEUTROPHILS # BLD AUTO: 4.9 K/UL (ref 1.8–7.7)
NEUTROPHILS NFR BLD: 74 % (ref 38–73)
NRBC BLD-RTO: 0 /100 WBC
PHOSPHATE SERPL-MCNC: 4.2 MG/DL (ref 2.7–4.5)
PLATELET # BLD AUTO: 246 K/UL (ref 150–450)
PMV BLD AUTO: 12.5 FL (ref 9.2–12.9)
POTASSIUM SERPL-SCNC: 4.4 MMOL/L (ref 3.5–5.1)
POTASSIUM SERPL-SCNC: 4.4 MMOL/L (ref 3.5–5.1)
PROT SERPL-MCNC: 6.7 G/DL (ref 6–8.4)
RBC # BLD AUTO: 3.81 M/UL (ref 4.6–6.2)
RSV AG BY MOLECULAR METHOD: NOT DETECTED
SARS-COV-2 RNA RESP QL NAA+PROBE: DETECTED
SODIUM SERPL-SCNC: 137 MMOL/L (ref 136–145)
SODIUM SERPL-SCNC: 137 MMOL/L (ref 136–145)
TROPONIN I SERPL DL<=0.01 NG/ML-MCNC: 0.07 NG/ML (ref 0–0.03)
TROPONIN I SERPL DL<=0.01 NG/ML-MCNC: 0.07 NG/ML (ref 0–0.03)
WBC # BLD AUTO: 6.61 K/UL (ref 3.9–12.7)

## 2023-01-12 PROCEDURE — 63600175 PHARM REV CODE 636 W HCPCS: Performed by: EMERGENCY MEDICINE

## 2023-01-12 PROCEDURE — 93005 ELECTROCARDIOGRAM TRACING: CPT

## 2023-01-12 PROCEDURE — 99284 EMERGENCY DEPT VISIT MOD MDM: CPT | Mod: CR,,, | Performed by: EMERGENCY MEDICINE

## 2023-01-12 PROCEDURE — 93010 EKG 12-LEAD: ICD-10-PCS | Mod: ,,, | Performed by: INTERNAL MEDICINE

## 2023-01-12 PROCEDURE — 84484 ASSAY OF TROPONIN QUANT: CPT | Performed by: EMERGENCY MEDICINE

## 2023-01-12 PROCEDURE — 99284 PR EMERGENCY DEPT VISIT,LEVEL IV: ICD-10-PCS | Mod: CR,,, | Performed by: EMERGENCY MEDICINE

## 2023-01-12 PROCEDURE — 85025 COMPLETE CBC W/AUTO DIFF WBC: CPT | Performed by: EMERGENCY MEDICINE

## 2023-01-12 PROCEDURE — 86803 HEPATITIS C AB TEST: CPT | Performed by: PHYSICIAN ASSISTANT

## 2023-01-12 PROCEDURE — 80197 ASSAY OF TACROLIMUS: CPT | Performed by: EMERGENCY MEDICINE

## 2023-01-12 PROCEDURE — 93010 ELECTROCARDIOGRAM REPORT: CPT | Mod: ,,, | Performed by: INTERNAL MEDICINE

## 2023-01-12 PROCEDURE — 84100 ASSAY OF PHOSPHORUS: CPT | Performed by: EMERGENCY MEDICINE

## 2023-01-12 PROCEDURE — 80053 COMPREHEN METABOLIC PANEL: CPT | Performed by: EMERGENCY MEDICINE

## 2023-01-12 PROCEDURE — 83880 ASSAY OF NATRIURETIC PEPTIDE: CPT | Performed by: EMERGENCY MEDICINE

## 2023-01-12 PROCEDURE — 0241U SARS-COV2 (COVID) WITH FLU/RSV BY PCR: CPT | Performed by: EMERGENCY MEDICINE

## 2023-01-12 PROCEDURE — 99285 EMERGENCY DEPT VISIT HI MDM: CPT | Mod: 25

## 2023-01-12 PROCEDURE — 96374 THER/PROPH/DIAG INJ IV PUSH: CPT

## 2023-01-12 RX ORDER — DEXAMETHASONE SODIUM PHOSPHATE 4 MG/ML
6 INJECTION, SOLUTION INTRA-ARTICULAR; INTRALESIONAL; INTRAMUSCULAR; INTRAVENOUS; SOFT TISSUE
Status: COMPLETED | OUTPATIENT
Start: 2023-01-12 | End: 2023-01-12

## 2023-01-12 RX ADMIN — DEXAMETHASONE SODIUM PHOSPHATE 6 MG: 4 INJECTION INTRA-ARTICULAR; INTRALESIONAL; INTRAMUSCULAR; INTRAVENOUS; SOFT TISSUE at 04:01

## 2023-01-12 NOTE — DISCHARGE INSTRUCTIONS
Future Appointments   Date Time Provider Department Center   1/19/2023  9:30 AM LAB, [facility]   3/13/2023  2:30 PM PRE-ADMIT, END[facility]

## 2023-01-12 NOTE — ED PROVIDER NOTES
Encounter Date: 1/12/2023       History     Chief Complaint   Patient presents with    Multiple Complaints     Kidney transplant pt. Decrease in urine output. Also reports cold, body aches and cough     HPI  66 Y/O immunocompromised male several years status post kidney transplant C/O several days of gradual onset generalized malaise, nasal congestion and cough productive of clear sputum with no associated HA, dizziness, neck pain/stiffnes, dyspnea or DAUGHERTY. He affirms nasal congestion with non-purulent discharge and pressure to maxillary and frontal region. Despite mild sore throat initially, he denies change in voice, drooling or dysphagia to solids or liquids and reports a sore throat practically gone. He assures no hemoptysis, sick contacts, recent travel and no Hx of TB or exposure to TB. No N/V, abd/back/chest pain despite intermittent cough.  No chest pain on exertion, dyspnea on exertion, orthopnea decreased exercise tolerance reported.  At triage she reported decreased urine output, which he did report slight decreased, but still existing.  He did report decreased p.o. intake fluids and solids secondary to general malaise.  Otherwise, no change in PO intake and no diarrhea/change in BMs.      Review of patient's allergies indicates:   Allergen Reactions    Ace inhibitors Swelling    Verapamil Other (See Comments)     Other reaction(s): Unknown    Povidone-iodine Itching     Past Medical History:   Diagnosis Date    (HFpEF) heart failure with preserved ejection fraction 9/25/2017    Atrial fibrillation     CAD (coronary artery disease)     CHF (congestive heart failure)     Chronic diastolic heart failure 4/8/2020    CKD (chronic kidney disease) stage 3, GFR 30-59 ml/min     Dr. Latif    CKD (chronic kidney disease) stage 3, GFR 30-59 ml/min     Diverticulosis     Fever blister     Heart attack 2006    Hyperlipidemia     Hypertension     Immunodeficiency due to treatment with immunosuppressive medication   "   Keloid cicatrix     Left lower quadrant abdominal pain 6/5/2022    Metabolic bone disease     Pericarditis     S/P kidney transplant     Supraventricular tachycardia 06/2019    Thrombocytopenia     Thyroid disease     Tobacco use 9/5/2014    Unspecified disorder of kidney and ureter     Stage IIICKD     Past Surgical History:   Procedure Laterality Date    CARDIOVERSION  04/30/15    CHOLECYSTECTOMY      COLONOSCOPY  April 20, 2011    Diverticulosis, repeat recommended in 3 yrs., repeat colonoscopy 2014 revealed 2 polyps.  He should return in 5 years.    COLONOSCOPY N/A 3/13/2020    Procedure: COLONOSCOPY;  Surgeon: Chon Casper MD;  Location: [facility] (4TH FLR);  Service: Endoscopy;  Laterality: N/A;  ok to hold coumadin x5days-see telephone encounter 2/4/20-tb    COLONOSCOPY N/A 2/4/2021    Procedure: COLONOSCOPY;  Surgeon: Chon Casper MD;  Location: [facility] (4TH FLR);  Service: Endoscopy;  Laterality: N/A;  Eliquis - per Dr. GAVIN Blunt ok to hold x 2 days and "restarted asap"- ERW  last prep "poor", [id number] ordered/ Pt requests Dr. Casper  prep ins. mailed - COVID screening on 2/1/21 [facility]- [facility]    COLONOSCOPY N/A 3/3/2021    Procedure: COLONOSCOPY;  Surgeon: Chon Casper MD;  Location: [facility] (4TH FLR);  Service: Endoscopy;  Laterality: N/A;  Patient with his second poor bowel pre and has poor prep today.  If patient not intersted or can't get colonoscopy tomorrow will need constipation bowel prep and will need to restart his Eliquis today.Thanks,Chon     per Dr Blunt-ok to hold Eliquis 2 days prior      COVID     CORONARY ANGIOPLASTY WITH STENT PLACEMENT  9/13/2006    KIDNEY TRANSPLANT  2005    PARATHYROIDECTOMY      TREATMENT OF CARDIAC ARRHYTHMIA N/A 3/28/2022    Procedure: CARDIOVERSION;  Surgeon: Migue Blunt MD;  Location: [facility] EP LAB;  Service: Cardiology;  Laterality: N/A;  AF, DCC, MAC, GP, 3 PREP*RODRIGO deferred pt 100% compliant*     Family History   Problem Relation Age of Onset "    No Known Problems Sister     No Known Problems Brother     Thyroid disease Mother         s/p surgery    Heart disease Father         had pacemaker    No Known Problems Sister     Kidney failure Sister     Kidney disease Sister     No Known Problems Sister     Kidney disease Brother     Kidney failure Brother         s/p transplant    Diabetes Mellitus Neg Hx     Stroke Neg Hx     Heart attack Neg Hx     Cancer Neg Hx     Celiac disease Neg Hx     Cirrhosis Neg Hx     Colon cancer Neg Hx     Esophageal cancer Neg Hx     Inflammatory bowel disease Neg Hx     Rectal cancer Neg Hx     Stomach cancer Neg Hx     Ulcerative colitis Neg Hx     Liver disease Neg Hx     Liver cancer Neg Hx     Crohn's disease Neg Hx     Melanoma Neg Hx      Social History     Tobacco Use    Smoking status: Former     Packs/day: 0.50     Years: 40.00     Pack years: 20.00     Types: Cigarettes     Quit date: 2022     Years since quittin.9    Smokeless tobacco: Never    Tobacco comments:     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     Patient is currently retired   Substance Use Topics    Alcohol use: No    Drug use: No     Review of Systems  HEENT: + Sore throat, ear pain and non-purulent rhinorrhea, but no headache, blurry vision or change in vision, eye pain, otorrhea, tooth pain, swelling, or voice changes.  NECK: No pain or stiffness, masses, trauma, or redness.  HEART: No pain, palpitations, diaphoresis, nausea, vomiting, or radiation of any pain symptoms.  LUNG: + Cough, but no SOB, DAUGHERTY or other complaints.  ABDOMEN: No pain, nausea, vomiting, diarrhea, constipation, or flank pain.  : + dec output. No discharge, dysuria, urgency, hesitancy, frequency, lesions, rashes, masses, or sores.  EXTREMITIES: FROM and no swelling, redness, trauma, lesions, sores, weakness, numbness, or tingling.  SKIN: No lesions, rashes, trauma or other complaints.  NEURO: No dizziness, weakness, fatigue,  tremors, nystagmus, headache, change in vision or disturbances of balance or coordination.    Physical Exam     Initial Vitals [01/12/23 1048]   BP Pulse Resp Temp SpO2   (!) 107/58 76 18 98.8 °F (37.1 °C) 98 %      MAP       --         Physical Exam  CONSTITUTIONAL: Calm. Cooperative. Non-toxic appearance. Well-developed and nourished. Sitting on chair/stretcher in no acute distress.  HEAD: NC/AT.  Right EAR: External ear normal. No TTP of Pinna/Tragus. Hearing and external ear normal. No lacerations. No drainage, swelling or tenderness. No foreign bodies.No mastoid TTPercussion. No middle ear effusion. No decreased hearing is noted.  Left EAR: External ear normal. No TTP of Pinna/Tragus. Hearing and external ear normal. No lacerations. No drainage, swelling or tenderness. No foreign bodies. No mastoid TTPercussion. No middle ear effusion. No decreased hearing is noted.  NOSE: + B/L Edematous Mucosal Turbinates with clear non-purulent rhinorrhea. No nose lacerations, nasal deformity, septal deviation or nasal septal hematoma. No epistaxis. No foreign bodies. Right sinus exhibits no maxillary or frontal sinus tenderness to percussion. Left sinus exhibits no maxillary or frontal sinus tenderness to percussion.  MOUTH/THROAT: Speaking Full Sentences with no drooling, hoarseness/muffled voice. Oropharynx is clear and moist and mucous membranes are normal. No uvular swelling or deviation. + Mild posterior oropharyngeal erythema, but No oropharyngeal asymmetry, exudates, posterior oropharyngeal edema, or tonsillar/peritonsilar abscesses. No trismus in the jaw. No sublingual elevation, rigidity or TTP.  EYES: Anicteric. PERRL. Conjunctivae normal and EOM are normal. Right eye exhibits  no chemosis, no discharge and no exudate. Left eye exhibits no chemosis, no discharge  and no exudate. No scleral icterus.  NECK: Supple with Trachea normal, normal range of motion, full passive range of motion without pain and phonation  normal. No muscular tenderness present. No rigidity. No tracheal deviation and normal range of motion present.  HEART: RRR, normal heart sounds, no murmurs and normal pulses.  PULMONARY/CHEST: No Tachypnea, Effort normal and breath sounds normal. No accessory muscle usage. No respiratory distress. Lungs CTA B/L with No W/R/R.  ABDOMEN: + fully healed scars, Soft. ND. No TTP to all quadrants no rigidity or involuntary guarding.  MUSCULOSKELETAL: FROM.  NEURO: AAOx3. Answering Questions Appropriately.  SKIN: Skin is warm, dry and intact. No rash noted.    ED Course   Procedures  Labs Reviewed   SARS-COV2 (COVID) WITH FLU/RSV BY PCR - Abnormal; Notable for the following components:       Result Value    SARS-CoV2 (COVID-19) Qualitative PCR Detected (*)     All other components within normal limits   CBC W/ AUTO DIFFERENTIAL - Abnormal; Notable for the following components:    RBC 3.81 (*)     Hemoglobin 9.1 (*)     Hematocrit 30.8 (*)     MCV 81 (*)     MCH 23.9 (*)     MCHC 29.5 (*)     RDW 15.0 (*)     Immature Granulocytes 1.1 (*)     Immature Grans (Abs) 0.07 (*)     Lymph # 0.5 (*)     Mono # 1.1 (*)     Gran % 74.0 (*)     Lymph % 7.6 (*)     Mono % 16.8 (*)     All other components within normal limits   COMPREHENSIVE METABOLIC PANEL - Abnormal; Notable for the following components:    CO2 22 (*)     BUN 26 (*)     Creatinine 2.8 (*)     Calcium 7.5 (*)     Albumin 3.1 (*)     eGFR 23.8 (*)     All other components within normal limits   TROPONIN I - Abnormal; Notable for the following components:    Troponin I 0.071 (*)     All other components within normal limits   B-TYPE NATRIURETIC PEPTIDE - Abnormal; Notable for the following components:     (*)     All other components within normal limits   RENAL FUNCTION PANEL - Abnormal; Notable for the following components:    CO2 22 (*)     BUN 26 (*)     Calcium 7.5 (*)     Creatinine 2.8 (*)     Albumin 3.1 (*)     eGFR 23.8 (*)     All other components  within normal limits   TROPONIN I - Abnormal; Notable for the following components:    Troponin I 0.075 (*)     All other components within normal limits   HEPATITIS C ANTIBODY    Narrative:     Release to patient->Immediate   TACROLIMUS LEVEL        ECG Results              EKG 12-lead (Final result)  Result time 01/12/23 14:05:19      Final result by Interface, Lab In [facility] (01/12/23 14:05:19)                   Narrative:    Test Reason : R06.02,    Vent. Rate : 063 BPM     Atrial Rate : 063 BPM     P-R Int : 180 ms          QRS Dur : 098 ms      QT Int : 426 ms       P-R-T Axes : 056 064 -07 degrees     QTc Int : 435 ms    Normal sinus rhythm  Nonspecific ST and T wave abnormality  Abnormal ECG  When compared with ECG of 06-JAN-2023 08:26,  T wave inversion now evident in Inferior leads  T wave inversion less evident in Lateral leads  Confirmed by Hernandez Millan MD ([id number]) on 1/12/2023 2:05:11 PM    Referred By: [email or web address]   SELF           Confirmed By:Hernandez Millan MD                                  Imaging Results              X-Ray Chest AP Portable (Final result)  Result time 01/12/23 14:26:50      Final result by Antonio Maya MD (01/12/23 14:26:50)                   Impression:      No active CHF process or acute change.      Electronically signed by: Antonio Maya MD  Date:    01/12/2023  Time:    14:26               Narrative:    EXAMINATION:  XR CHEST AP PORTABLE    CLINICAL HISTORY:  CHF;    TECHNIQUE:  Single frontal view of the chest was performed.    COMPARISON:  07/30/2022, 06/05/2022 chest and CT chest abdomen 11/16/2022    FINDINGS:  Top-normal size heart.  Lungs remain clear.  Pulmonary vascular tree intact.  3 tiny calcific granulomas lateral segment left upper lobe.                                       Medications   dexAMETHasone injection 6 mg (6 mg Intravenous Given 1/12/23 1620)     Medical Decision Making:   History:   Old Medical Records: I decided to obtain old medical  records.  Old Records Summarized: records from previous admission(s).       <> Summary of Records: · Moderate left atrial enlargement.  · The left ventricle is normal in size with concentric remodeling and normal systolic function.  · The estimated ejection fraction is 55%.  · There are segmental left ventricular wall motion abnormalities : akinetic basal inferior wall.  · Indeterminate left ventricular diastolic function.  · Mild right atrial enlargement.  · Normal right ventricular size with normal right ventricular systolic function.  · There is mild aortic valve stenosis.  · Aortic valve area is 1.84 cm2; peak velocity is 2.60 m/s; mean gradient is 13 mmHg.  · Mild aortic regurgitation.  · Mild pulmonic regurgitation.  · The estimated PA systolic pressure is 25 mmHg.  · Normal central venous pressure (3 mmHg).  Initial Assessment:   Afebrile here in the emergency department, but reporting fever T-max 101° at home, atraumatic and hemodynamically stable immunocompromised male 17 years status post last renal transplant presents with signs and symptoms of viral upper respiratory tract infection and decreased urine output warranting renal function studies and electrolyte evaluation.  No airway or respiratory instability appreciated with lungs clear to auscultation despite distant breath sounds likely secondary to his long-time smoking history, for which he stopped approximately 1 year ago.  Will obtain chest x-ray labs and closely monitor while in the emergency department.  No reported chest pain, chest pain on exertion, decreased exercise tolerance or other symptoms that would indicate acute coronary syndrome in the last week and consistent with no evidence of right heart failure on exam.  ____________________  Reji Presley MD, FAAEM  Emergency Medicine Staff  12:09 PM 1/12/2023    F/U:  Patient has continued with no respiratory stress distress, hypoxia or up appreciated work of breathing.  Ambulated/road tested on  multiple occasions with no concerning work of breathing or respiratory distress.  Troponin flat unlikely myocardial infarction.  Discussed benefit: Harm of Paxlovid and other COVID antiviral medications, which patient refused.  Discussed symptoms warranting ED return, which he understood.  Patient and wife, who also has COVID already have a pulse oximeter at home.  ____________________  Reji Presley MD, [facility]  Emergency Medicine Staff  5:41 PM 1/12/2023    Clinical Tests:   Lab Tests: Ordered and Reviewed  Radiological Study: Ordered and Reviewed  Medical Tests: Ordered and Reviewed                        Clinical Impression:   Final diagnoses:  [R06.02] Shortness of breath  [U07.1] COVID-19 virus infection (Primary)  [R09.81, J34.89] Nasal congestion with rhinorrhea  [J06.9] Viral upper respiratory tract infection        ED Disposition Condition    Discharge Stable          ED Prescriptions    None       Follow-up Information       Follow up With Specialties Details Why Contact Info    [facility] - Emergency Dept Emergency Medicine  If you develop any shortness of breath, persistent vomiting and unable to drink or eat, chest pain or any other concerns. [address]  [facility] [id number]  [phone]             Kumar Presley MD  01/26/23 0549

## 2023-01-13 ENCOUNTER — PATIENT MESSAGE (OUTPATIENT)
Dept: NEPHROLOGY | Facility: CLINIC | Age: 68
End: 2023-01-13
Payer: MEDICARE

## 2023-01-13 LAB — TACROLIMUS BLD-MCNC: 6 NG/ML (ref 5–15)

## 2023-01-18 ENCOUNTER — PATIENT MESSAGE (OUTPATIENT)
Dept: NEPHROLOGY | Facility: CLINIC | Age: 68
End: 2023-01-18
Payer: MEDICARE

## 2023-01-18 DIAGNOSIS — E11.9 TYPE 2 DIABETES MELLITUS WITHOUT COMPLICATION: ICD-10-CM

## 2023-01-26 ENCOUNTER — PATIENT MESSAGE (OUTPATIENT)
Dept: NEPHROLOGY | Facility: CLINIC | Age: 68
End: 2023-01-26
Payer: MEDICARE

## 2023-01-26 DIAGNOSIS — N18.4 CKD (CHRONIC KIDNEY DISEASE) STAGE 4, GFR 15-29 ML/MIN: Primary | ICD-10-CM

## 2023-01-26 DIAGNOSIS — E55.9 VITAMIN D DEFICIENCY: ICD-10-CM

## 2023-01-27 ENCOUNTER — PATIENT MESSAGE (OUTPATIENT)
Dept: INTERNAL MEDICINE | Facility: CLINIC | Age: 68
End: 2023-01-27
Payer: MEDICARE

## 2023-01-27 ENCOUNTER — LAB VISIT (OUTPATIENT)
Dept: LAB | Facility: HOSPITAL | Age: 68
End: 2023-01-27
Attending: INTERNAL MEDICINE
Payer: MEDICARE

## 2023-01-27 DIAGNOSIS — N18.4 CKD (CHRONIC KIDNEY DISEASE) STAGE 4, GFR 15-29 ML/MIN: ICD-10-CM

## 2023-01-27 LAB
25(OH)D3+25(OH)D2 SERPL-MCNC: 72 NG/ML (ref 30–96)
ALBUMIN SERPL BCP-MCNC: 3 G/DL (ref 3.5–5.2)
ANION GAP SERPL CALC-SCNC: 9 MMOL/L (ref 8–16)
BASOPHILS # BLD AUTO: 0.01 K/UL (ref 0–0.2)
BASOPHILS NFR BLD: 0.2 % (ref 0–1.9)
BUN SERPL-MCNC: 27 MG/DL (ref 8–23)
CALCIUM SERPL-MCNC: 7 MG/DL (ref 8.7–10.5)
CHLORIDE SERPL-SCNC: 110 MMOL/L (ref 95–110)
CO2 SERPL-SCNC: 20 MMOL/L (ref 23–29)
CREAT SERPL-MCNC: 2.7 MG/DL (ref 0.5–1.4)
DIFFERENTIAL METHOD: ABNORMAL
EOSINOPHIL # BLD AUTO: 0.1 K/UL (ref 0–0.5)
EOSINOPHIL NFR BLD: 1.4 % (ref 0–8)
ERYTHROCYTE [DISTWIDTH] IN BLOOD BY AUTOMATED COUNT: 15.4 % (ref 11.5–14.5)
EST. GFR  (NO RACE VARIABLE): 24.9 ML/MIN/1.73 M^2
GLUCOSE SERPL-MCNC: 97 MG/DL (ref 70–110)
HCT VFR BLD AUTO: 32.7 % (ref 40–54)
HGB BLD-MCNC: 9.6 G/DL (ref 14–18)
IMM GRANULOCYTES # BLD AUTO: 0.03 K/UL (ref 0–0.04)
IMM GRANULOCYTES NFR BLD AUTO: 0.7 % (ref 0–0.5)
LYMPHOCYTES # BLD AUTO: 0.6 K/UL (ref 1–4.8)
LYMPHOCYTES NFR BLD: 15 % (ref 18–48)
MAGNESIUM SERPL-MCNC: 2.1 MG/DL (ref 1.6–2.6)
MCH RBC QN AUTO: 24 PG (ref 27–31)
MCHC RBC AUTO-ENTMCNC: 29.4 G/DL (ref 32–36)
MCV RBC AUTO: 82 FL (ref 82–98)
MONOCYTES # BLD AUTO: 0.4 K/UL (ref 0.3–1)
MONOCYTES NFR BLD: 9.4 % (ref 4–15)
NEUTROPHILS # BLD AUTO: 3.1 K/UL (ref 1.8–7.7)
NEUTROPHILS NFR BLD: 73.3 % (ref 38–73)
NRBC BLD-RTO: 0 /100 WBC
PHOSPHATE SERPL-MCNC: 5.4 MG/DL (ref 2.7–4.5)
PLATELET # BLD AUTO: 167 K/UL (ref 150–450)
PMV BLD AUTO: 12 FL (ref 9.2–12.9)
POTASSIUM SERPL-SCNC: 4.1 MMOL/L (ref 3.5–5.1)
PTH-INTACT SERPL-MCNC: 72.1 PG/ML (ref 9–77)
RBC # BLD AUTO: 4 M/UL (ref 4.6–6.2)
SODIUM SERPL-SCNC: 139 MMOL/L (ref 136–145)
WBC # BLD AUTO: 4.26 K/UL (ref 3.9–12.7)

## 2023-01-27 PROCEDURE — 82306 VITAMIN D 25 HYDROXY: CPT | Performed by: INTERNAL MEDICINE

## 2023-01-27 PROCEDURE — 36415 COLL VENOUS BLD VENIPUNCTURE: CPT | Performed by: INTERNAL MEDICINE

## 2023-01-27 PROCEDURE — 80069 RENAL FUNCTION PANEL: CPT | Performed by: INTERNAL MEDICINE

## 2023-01-27 PROCEDURE — 80197 ASSAY OF TACROLIMUS: CPT | Performed by: INTERNAL MEDICINE

## 2023-01-27 PROCEDURE — 83735 ASSAY OF MAGNESIUM: CPT | Performed by: INTERNAL MEDICINE

## 2023-01-27 PROCEDURE — 83970 ASSAY OF PARATHORMONE: CPT | Performed by: INTERNAL MEDICINE

## 2023-01-27 PROCEDURE — 85025 COMPLETE CBC W/AUTO DIFF WBC: CPT | Performed by: INTERNAL MEDICINE

## 2023-01-28 LAB — TACROLIMUS BLD-MCNC: 3.2 NG/ML (ref 5–15)

## 2023-01-30 ENCOUNTER — PATIENT MESSAGE (OUTPATIENT)
Dept: INTERNAL MEDICINE | Facility: CLINIC | Age: 68
End: 2023-01-30
Payer: MEDICARE

## 2023-01-30 RX ORDER — ALBUTEROL SULFATE 0.83 MG/ML
2.5 SOLUTION RESPIRATORY (INHALATION) EVERY 6 HOURS PRN
Qty: 9 ML | Refills: 6 | Status: ON HOLD | OUTPATIENT
Start: 2023-01-30 | End: 2023-11-26 | Stop reason: HOSPADM

## 2023-01-30 NOTE — TELEPHONE ENCOUNTER
No new care gaps identified.  [facility] Embedded Care Gaps. Reference number: [id number]. 1/30/2023   9:38:53 AM CST

## 2023-02-06 ENCOUNTER — PATIENT MESSAGE (OUTPATIENT)
Dept: INTERNAL MEDICINE | Facility: CLINIC | Age: 68
End: 2023-02-06
Payer: MEDICARE

## 2023-02-07 ENCOUNTER — HOSPITAL ENCOUNTER (INPATIENT)
Facility: HOSPITAL | Age: 68
LOS: 3 days | Discharge: HOME | DRG: 178 | End: 2023-02-10
Attending: EMERGENCY MEDICINE | Admitting: STUDENT IN AN ORGANIZED HEALTH CARE EDUCATION/TRAINING PROGRAM
Payer: MEDICARE

## 2023-02-07 ENCOUNTER — PATIENT MESSAGE (OUTPATIENT)
Dept: NEPHROLOGY | Facility: CLINIC | Age: 68
End: 2023-02-07
Payer: MEDICARE

## 2023-02-07 DIAGNOSIS — N17.9 ACUTE RENAL FAILURE SUPERIMPOSED ON STAGE 4 CHRONIC KIDNEY DISEASE, UNSPECIFIED ACUTE RENAL FAILURE TYPE: ICD-10-CM

## 2023-02-07 DIAGNOSIS — R06.09 DOE (DYSPNEA ON EXERTION): ICD-10-CM

## 2023-02-07 DIAGNOSIS — E87.6 HYPOKALEMIA: ICD-10-CM

## 2023-02-07 DIAGNOSIS — Z00.00 ENCOUNTER FOR MEDICARE ANNUAL WELLNESS EXAM: ICD-10-CM

## 2023-02-07 DIAGNOSIS — R53.1 GENERALIZED WEAKNESS: ICD-10-CM

## 2023-02-07 DIAGNOSIS — D63.1 ANEMIA DUE TO STAGE 4 CHRONIC KIDNEY DISEASE: ICD-10-CM

## 2023-02-07 DIAGNOSIS — E87.5 HYPERKALEMIA: ICD-10-CM

## 2023-02-07 DIAGNOSIS — U07.1 COVID-19 VIRUS INFECTION: ICD-10-CM

## 2023-02-07 DIAGNOSIS — N18.4 ANEMIA DUE TO STAGE 4 CHRONIC KIDNEY DISEASE: ICD-10-CM

## 2023-02-07 DIAGNOSIS — I50.32 CHRONIC HEART FAILURE WITH PRESERVED EJECTION FRACTION: ICD-10-CM

## 2023-02-07 DIAGNOSIS — R68.89 MULTIPLE COMPLAINTS: ICD-10-CM

## 2023-02-07 DIAGNOSIS — R06.02 SHORTNESS OF BREATH: ICD-10-CM

## 2023-02-07 DIAGNOSIS — N18.4 ACUTE RENAL FAILURE SUPERIMPOSED ON STAGE 4 CHRONIC KIDNEY DISEASE, UNSPECIFIED ACUTE RENAL FAILURE TYPE: ICD-10-CM

## 2023-02-07 DIAGNOSIS — I50.32 CHRONIC DIASTOLIC HEART FAILURE: Primary | ICD-10-CM

## 2023-02-07 DIAGNOSIS — N17.9 AKI (ACUTE KIDNEY INJURY): ICD-10-CM

## 2023-02-07 LAB
ALBUMIN SERPL BCP-MCNC: 3 G/DL (ref 3.5–5.2)
ALP SERPL-CCNC: 73 U/L (ref 55–135)
ALT SERPL W/O P-5'-P-CCNC: 25 U/L (ref 10–44)
ANION GAP SERPL CALC-SCNC: 14 MMOL/L (ref 8–16)
APTT BLDCRRT: 32.5 SEC (ref 21–32)
AST SERPL-CCNC: 29 U/L (ref 10–40)
BACTERIA #/AREA URNS AUTO: ABNORMAL /HPF
BASOPHILS # BLD AUTO: 0.01 K/UL (ref 0–0.2)
BASOPHILS NFR BLD: 0.2 % (ref 0–1.9)
BILIRUB SERPL-MCNC: 0.3 MG/DL (ref 0.1–1)
BILIRUB UR QL STRIP: NEGATIVE
BNP SERPL-MCNC: 184 PG/ML (ref 0–99)
BUN SERPL-MCNC: 37 MG/DL (ref 8–23)
CALCIUM SERPL-MCNC: 7.2 MG/DL (ref 8.7–10.5)
CHLORIDE SERPL-SCNC: 110 MMOL/L (ref 95–110)
CK SERPL-CCNC: 113 U/L (ref 20–200)
CLARITY UR REFRACT.AUTO: CLEAR
CO2 SERPL-SCNC: 15 MMOL/L (ref 23–29)
COLOR UR AUTO: YELLOW
CREAT SERPL-MCNC: 3.5 MG/DL (ref 0.5–1.4)
D DIMER PPP IA.FEU-MCNC: 0.4 MG/L FEU
DIFFERENTIAL METHOD: ABNORMAL
EOSINOPHIL # BLD AUTO: 0 K/UL (ref 0–0.5)
EOSINOPHIL NFR BLD: 0.9 % (ref 0–8)
ERYTHROCYTE [DISTWIDTH] IN BLOOD BY AUTOMATED COUNT: 15.5 % (ref 11.5–14.5)
EST. GFR  (NO RACE VARIABLE): 18.2 ML/MIN/1.73 M^2
FERRITIN SERPL-MCNC: 2130 NG/ML (ref 20–300)
GLUCOSE SERPL-MCNC: 107 MG/DL (ref 70–110)
GLUCOSE UR QL STRIP: NEGATIVE
HCT VFR BLD AUTO: 32.3 % (ref 40–54)
HGB BLD-MCNC: 9.7 G/DL (ref 14–18)
HGB UR QL STRIP: NEGATIVE
HYALINE CASTS UR QL AUTO: 3 /LPF
IMM GRANULOCYTES # BLD AUTO: 0.06 K/UL (ref 0–0.04)
IMM GRANULOCYTES NFR BLD AUTO: 1.4 % (ref 0–0.5)
INFLUENZA A, MOLECULAR: NOT DETECTED
INFLUENZA B, MOLECULAR: NOT DETECTED
INR PPP: 1.2 (ref 0.8–1.2)
KETONES UR QL STRIP: NEGATIVE
LACTATE SERPL-SCNC: 1.3 MMOL/L (ref 0.5–2.2)
LEUKOCYTE ESTERASE UR QL STRIP: ABNORMAL
LYMPHOCYTES # BLD AUTO: 0.3 K/UL (ref 1–4.8)
LYMPHOCYTES NFR BLD: 7.4 % (ref 18–48)
MCH RBC QN AUTO: 23.4 PG (ref 27–31)
MCHC RBC AUTO-ENTMCNC: 30 G/DL (ref 32–36)
MCV RBC AUTO: 78 FL (ref 82–98)
MICROSCOPIC COMMENT: ABNORMAL
MONOCYTES # BLD AUTO: 0.4 K/UL (ref 0.3–1)
MONOCYTES NFR BLD: 8.1 % (ref 4–15)
NEUTROPHILS # BLD AUTO: 3.6 K/UL (ref 1.8–7.7)
NEUTROPHILS NFR BLD: 82 % (ref 38–73)
NITRITE UR QL STRIP: NEGATIVE
NRBC BLD-RTO: 0 /100 WBC
PH UR STRIP: 5 [PH] (ref 5–8)
PLATELET # BLD AUTO: 152 K/UL (ref 150–450)
PMV BLD AUTO: 11.1 FL (ref 9.2–12.9)
POTASSIUM SERPL-SCNC: 5 MMOL/L (ref 3.5–5.1)
PROT SERPL-MCNC: 6.5 G/DL (ref 6–8.4)
PROT UR QL STRIP: ABNORMAL
PROTHROMBIN TIME: 12.4 SEC (ref 9–12.5)
RBC # BLD AUTO: 4.15 M/UL (ref 4.6–6.2)
RBC #/AREA URNS AUTO: 1 /HPF (ref 0–4)
RSV AG BY MOLECULAR METHOD: NOT DETECTED
SARS-COV-2 RNA RESP QL NAA+PROBE: DETECTED
SODIUM SERPL-SCNC: 139 MMOL/L (ref 136–145)
SP GR UR STRIP: 1.01 (ref 1–1.03)
TROPONIN I SERPL DL<=0.01 NG/ML-MCNC: 0.04 NG/ML (ref 0–0.03)
TROPONIN I SERPL DL<=0.01 NG/ML-MCNC: 0.06 NG/ML (ref 0–0.03)
URN SPEC COLLECT METH UR: ABNORMAL
WBC # BLD AUTO: 4.44 K/UL (ref 3.9–12.7)
WBC #/AREA URNS AUTO: 17 /HPF (ref 0–5)

## 2023-02-07 PROCEDURE — 99223 PR INITIAL HOSPITAL CARE,LEVL III: ICD-10-PCS | Mod: ,,,

## 2023-02-07 PROCEDURE — 93005 ELECTROCARDIOGRAM TRACING: CPT

## 2023-02-07 PROCEDURE — 83605 ASSAY OF LACTIC ACID: CPT | Performed by: EMERGENCY MEDICINE

## 2023-02-07 PROCEDURE — 84484 ASSAY OF TROPONIN QUANT: CPT | Mod: 91 | Performed by: EMERGENCY MEDICINE

## 2023-02-07 PROCEDURE — 63600175 PHARM REV CODE 636 W HCPCS: Performed by: EMERGENCY MEDICINE

## 2023-02-07 PROCEDURE — 25000003 PHARM REV CODE 250: Performed by: EMERGENCY MEDICINE

## 2023-02-07 PROCEDURE — 96361 HYDRATE IV INFUSION ADD-ON: CPT

## 2023-02-07 PROCEDURE — 93010 EKG 12-LEAD: ICD-10-PCS | Mod: ,,, | Performed by: INTERNAL MEDICINE

## 2023-02-07 PROCEDURE — 85025 COMPLETE CBC W/AUTO DIFF WBC: CPT | Performed by: EMERGENCY MEDICINE

## 2023-02-07 PROCEDURE — 63600175 PHARM REV CODE 636 W HCPCS

## 2023-02-07 PROCEDURE — 83880 ASSAY OF NATRIURETIC PEPTIDE: CPT | Performed by: EMERGENCY MEDICINE

## 2023-02-07 PROCEDURE — 85379 FIBRIN DEGRADATION QUANT: CPT

## 2023-02-07 PROCEDURE — 0241U SARS-COV2 (COVID) WITH FLU/RSV BY PCR: CPT | Performed by: EMERGENCY MEDICINE

## 2023-02-07 PROCEDURE — 96365 THER/PROPH/DIAG IV INF INIT: CPT

## 2023-02-07 PROCEDURE — 82728 ASSAY OF FERRITIN: CPT

## 2023-02-07 PROCEDURE — 25000003 PHARM REV CODE 250

## 2023-02-07 PROCEDURE — 12000002 HC ACUTE/MED SURGE SEMI-PRIVATE ROOM

## 2023-02-07 PROCEDURE — 82550 ASSAY OF CK (CPK): CPT

## 2023-02-07 PROCEDURE — 87086 URINE CULTURE/COLONY COUNT: CPT

## 2023-02-07 PROCEDURE — 99285 EMERGENCY DEPT VISIT HI MDM: CPT | Mod: 25

## 2023-02-07 PROCEDURE — 27000207 HC ISOLATION

## 2023-02-07 PROCEDURE — 99223 1ST HOSP IP/OBS HIGH 75: CPT | Mod: ,,,

## 2023-02-07 PROCEDURE — 81001 URINALYSIS AUTO W/SCOPE: CPT

## 2023-02-07 PROCEDURE — 80053 COMPREHEN METABOLIC PANEL: CPT | Performed by: EMERGENCY MEDICINE

## 2023-02-07 PROCEDURE — 25000242 PHARM REV CODE 250 ALT 637 W/ HCPCS: Performed by: EMERGENCY MEDICINE

## 2023-02-07 PROCEDURE — 99285 PR EMERGENCY DEPT VISIT,LEVEL V: ICD-10-PCS | Mod: CR,CS,, | Performed by: EMERGENCY MEDICINE

## 2023-02-07 PROCEDURE — 94761 N-INVAS EAR/PLS OXIMETRY MLT: CPT

## 2023-02-07 PROCEDURE — 87040 BLOOD CULTURE FOR BACTERIA: CPT | Performed by: EMERGENCY MEDICINE

## 2023-02-07 PROCEDURE — 84484 ASSAY OF TROPONIN QUANT: CPT

## 2023-02-07 PROCEDURE — 99285 EMERGENCY DEPT VISIT HI MDM: CPT | Mod: CR,CS,, | Performed by: EMERGENCY MEDICINE

## 2023-02-07 PROCEDURE — 85730 THROMBOPLASTIN TIME PARTIAL: CPT

## 2023-02-07 PROCEDURE — 93010 ELECTROCARDIOGRAM REPORT: CPT | Mod: ,,, | Performed by: INTERNAL MEDICINE

## 2023-02-07 PROCEDURE — 85610 PROTHROMBIN TIME: CPT

## 2023-02-07 PROCEDURE — 94640 AIRWAY INHALATION TREATMENT: CPT

## 2023-02-07 RX ORDER — CYCLOBENZAPRINE HCL 5 MG
5 TABLET ORAL 3 TIMES DAILY PRN
Status: DISCONTINUED | OUTPATIENT
Start: 2023-02-07 | End: 2023-02-10 | Stop reason: HOSPADM

## 2023-02-07 RX ORDER — ALBUTEROL SULFATE 90 UG/1
2 AEROSOL, METERED RESPIRATORY (INHALATION) EVERY 6 HOURS
Status: DISCONTINUED | OUTPATIENT
Start: 2023-02-07 | End: 2023-02-08

## 2023-02-07 RX ORDER — FLECAINIDE ACETATE 50 MG/1
50 TABLET ORAL EVERY 12 HOURS
Status: DISCONTINUED | OUTPATIENT
Start: 2023-02-07 | End: 2023-02-10 | Stop reason: HOSPADM

## 2023-02-07 RX ORDER — CYCLOBENZAPRINE HCL 5 MG
5 TABLET ORAL 2 TIMES DAILY PRN
Status: DISCONTINUED | OUTPATIENT
Start: 2023-02-07 | End: 2023-02-07

## 2023-02-07 RX ORDER — TACROLIMUS 1 MG/1
2 CAPSULE ORAL 2 TIMES DAILY
Status: DISCONTINUED | OUTPATIENT
Start: 2023-02-07 | End: 2023-02-10 | Stop reason: HOSPADM

## 2023-02-07 RX ORDER — DOXAZOSIN 2 MG/1
4 TABLET ORAL EVERY 12 HOURS
Status: DISCONTINUED | OUTPATIENT
Start: 2023-02-07 | End: 2023-02-10 | Stop reason: HOSPADM

## 2023-02-07 RX ORDER — HYDRALAZINE HYDROCHLORIDE 25 MG/1
50 TABLET, FILM COATED ORAL 3 TIMES DAILY
Status: DISCONTINUED | OUTPATIENT
Start: 2023-02-07 | End: 2023-02-07

## 2023-02-07 RX ORDER — CALCIUM CARBONATE 200(500)MG
500 TABLET,CHEWABLE ORAL DAILY PRN
Status: DISCONTINUED | OUTPATIENT
Start: 2023-02-07 | End: 2023-02-10 | Stop reason: HOSPADM

## 2023-02-07 RX ORDER — ASCORBIC ACID 500 MG
500 TABLET ORAL 2 TIMES DAILY
Status: DISCONTINUED | OUTPATIENT
Start: 2023-02-07 | End: 2023-02-10 | Stop reason: HOSPADM

## 2023-02-07 RX ORDER — FLUTICASONE FUROATE AND VILANTEROL 100; 25 UG/1; UG/1
1 POWDER RESPIRATORY (INHALATION) DAILY
Status: DISCONTINUED | OUTPATIENT
Start: 2023-02-08 | End: 2023-02-10 | Stop reason: HOSPADM

## 2023-02-07 RX ORDER — CHOLECALCIFEROL (VITAMIN D3) 25 MCG
1000 TABLET ORAL 2 TIMES DAILY
Status: DISCONTINUED | OUTPATIENT
Start: 2023-02-07 | End: 2023-02-07

## 2023-02-07 RX ORDER — GABAPENTIN 300 MG/1
300 CAPSULE ORAL 2 TIMES DAILY
Status: DISCONTINUED | OUTPATIENT
Start: 2023-02-07 | End: 2023-02-10 | Stop reason: HOSPADM

## 2023-02-07 RX ORDER — FLUTICASONE PROPIONATE 50 MCG
1 SPRAY, SUSPENSION (ML) NASAL DAILY
Status: DISCONTINUED | OUTPATIENT
Start: 2023-02-08 | End: 2023-02-10 | Stop reason: HOSPADM

## 2023-02-07 RX ORDER — TALC
6 POWDER (GRAM) TOPICAL NIGHTLY PRN
Status: DISCONTINUED | OUTPATIENT
Start: 2023-02-07 | End: 2023-02-10 | Stop reason: HOSPADM

## 2023-02-07 RX ORDER — TACROLIMUS 0.5 MG/1
CAPSULE ORAL
Status: ON HOLD | COMMUNITY
Start: 2023-01-21 | End: 2023-03-06 | Stop reason: HOSPADM

## 2023-02-07 RX ORDER — IBUPROFEN 200 MG
16 TABLET ORAL
Status: DISCONTINUED | OUTPATIENT
Start: 2023-02-07 | End: 2023-02-10 | Stop reason: HOSPADM

## 2023-02-07 RX ORDER — SODIUM CHLORIDE 0.9 % (FLUSH) 0.9 %
10 SYRINGE (ML) INJECTION
Status: DISCONTINUED | OUTPATIENT
Start: 2023-02-07 | End: 2023-02-10 | Stop reason: HOSPADM

## 2023-02-07 RX ORDER — CETIRIZINE HYDROCHLORIDE 10 MG/1
10 TABLET ORAL DAILY
Status: DISCONTINUED | OUTPATIENT
Start: 2023-02-08 | End: 2023-02-08

## 2023-02-07 RX ORDER — IPRATROPIUM BROMIDE AND ALBUTEROL SULFATE 2.5; .5 MG/3ML; MG/3ML
3 SOLUTION RESPIRATORY (INHALATION)
Status: COMPLETED | OUTPATIENT
Start: 2023-02-07 | End: 2023-02-07

## 2023-02-07 RX ORDER — GLUCAGON 1 MG
1 KIT INJECTION
Status: DISCONTINUED | OUTPATIENT
Start: 2023-02-07 | End: 2023-02-10 | Stop reason: HOSPADM

## 2023-02-07 RX ORDER — ATORVASTATIN CALCIUM 10 MG/1
10 TABLET, FILM COATED ORAL DAILY
Status: DISCONTINUED | OUTPATIENT
Start: 2023-02-07 | End: 2023-02-10 | Stop reason: HOSPADM

## 2023-02-07 RX ORDER — ASPIRIN 81 MG/1
81 TABLET ORAL DAILY
Status: DISCONTINUED | OUTPATIENT
Start: 2023-02-07 | End: 2023-02-10 | Stop reason: HOSPADM

## 2023-02-07 RX ORDER — CYCLOBENZAPRINE HCL 5 MG
5 TABLET ORAL 2 TIMES DAILY PRN
Status: ON HOLD | COMMUNITY
Start: 2023-01-25 | End: 2023-03-06 | Stop reason: HOSPADM

## 2023-02-07 RX ORDER — SPIRONOLACTONE 25 MG/1
25 TABLET ORAL DAILY
Status: DISCONTINUED | OUTPATIENT
Start: 2023-02-08 | End: 2023-02-08

## 2023-02-07 RX ORDER — FAMOTIDINE 20 MG/1
20 TABLET, FILM COATED ORAL DAILY
Status: DISCONTINUED | OUTPATIENT
Start: 2023-02-08 | End: 2023-02-10 | Stop reason: HOSPADM

## 2023-02-07 RX ORDER — NIFEDIPINE 30 MG/1
60 TABLET, EXTENDED RELEASE ORAL 2 TIMES DAILY
Status: DISCONTINUED | OUTPATIENT
Start: 2023-02-07 | End: 2023-02-10 | Stop reason: HOSPADM

## 2023-02-07 RX ORDER — PREDNISONE 5 MG/1
5 TABLET ORAL DAILY
Status: DISCONTINUED | OUTPATIENT
Start: 2023-02-08 | End: 2023-02-07

## 2023-02-07 RX ORDER — IBUPROFEN 200 MG
24 TABLET ORAL
Status: DISCONTINUED | OUTPATIENT
Start: 2023-02-07 | End: 2023-02-10 | Stop reason: HOSPADM

## 2023-02-07 RX ORDER — HYOSCYAMINE SULFATE 0.12 MG/1
0.12 TABLET SUBLINGUAL EVERY 4 HOURS PRN
Status: DISCONTINUED | OUTPATIENT
Start: 2023-02-07 | End: 2023-02-10 | Stop reason: HOSPADM

## 2023-02-07 RX ORDER — PARICALCITOL 1 UG/1
1 CAPSULE, LIQUID FILLED ORAL
Status: DISCONTINUED | OUTPATIENT
Start: 2023-02-08 | End: 2023-02-08

## 2023-02-07 RX ORDER — BENZONATATE 100 MG/1
100 CAPSULE ORAL 3 TIMES DAILY PRN
Status: DISCONTINUED | OUTPATIENT
Start: 2023-02-07 | End: 2023-02-10 | Stop reason: HOSPADM

## 2023-02-07 RX ORDER — PROCHLORPERAZINE MALEATE 10 MG
10 TABLET ORAL EVERY 6 HOURS PRN
Status: DISCONTINUED | OUTPATIENT
Start: 2023-02-07 | End: 2023-02-10 | Stop reason: HOSPADM

## 2023-02-07 RX ORDER — SODIUM CHLORIDE 0.9 % (FLUSH) 0.9 %
10 SYRINGE (ML) INJECTION
Status: DISCONTINUED | OUTPATIENT
Start: 2023-02-07 | End: 2023-02-07

## 2023-02-07 RX ORDER — TALC
6 POWDER (GRAM) TOPICAL NIGHTLY PRN
Status: DISCONTINUED | OUTPATIENT
Start: 2023-02-07 | End: 2023-02-07

## 2023-02-07 RX ORDER — ACETAMINOPHEN 325 MG/1
650 TABLET ORAL EVERY 6 HOURS PRN
Status: DISCONTINUED | OUTPATIENT
Start: 2023-02-07 | End: 2023-02-10 | Stop reason: HOSPADM

## 2023-02-07 RX ORDER — FUROSEMIDE 20 MG/1
20 TABLET ORAL 2 TIMES DAILY
Status: DISCONTINUED | OUTPATIENT
Start: 2023-02-07 | End: 2023-02-08

## 2023-02-07 RX ORDER — POLYETHYLENE GLYCOL 3350 17 G/17G
17 POWDER, FOR SOLUTION ORAL DAILY
Status: DISCONTINUED | OUTPATIENT
Start: 2023-02-07 | End: 2023-02-10 | Stop reason: HOSPADM

## 2023-02-07 RX ADMIN — FUROSEMIDE 20 MG: 20 TABLET ORAL at 08:02

## 2023-02-07 RX ADMIN — Medication 500 MG: at 08:02

## 2023-02-07 RX ADMIN — SODIUM CHLORIDE 2397 ML: 9 INJECTION, SOLUTION INTRAVENOUS at 12:02

## 2023-02-07 RX ADMIN — TACROLIMUS 2 MG: 1 CAPSULE ORAL at 08:02

## 2023-02-07 RX ADMIN — IPRATROPIUM BROMIDE AND ALBUTEROL SULFATE 3 ML: .5; 3 SOLUTION RESPIRATORY (INHALATION) at 12:02

## 2023-02-07 RX ADMIN — ASPIRIN 81 MG: 81 TABLET, COATED ORAL at 04:02

## 2023-02-07 RX ADMIN — PIPERACILLIN SODIUM AND TAZOBACTAM SODIUM 4.5 G: 4; .5 INJECTION, POWDER, FOR SOLUTION INTRAVENOUS at 12:02

## 2023-02-07 RX ADMIN — DOXAZOSIN 4 MG: 2 TABLET ORAL at 08:02

## 2023-02-07 RX ADMIN — DEXAMETHASONE 6 MG: 4 TABLET ORAL at 04:02

## 2023-02-07 RX ADMIN — NIFEDIPINE 60 MG: 30 TABLET, FILM COATED, EXTENDED RELEASE ORAL at 08:02

## 2023-02-07 RX ADMIN — GABAPENTIN 300 MG: 300 CAPSULE ORAL at 08:02

## 2023-02-07 RX ADMIN — APIXABAN 5 MG: 5 TABLET, FILM COATED ORAL at 08:02

## 2023-02-07 NOTE — ED PROVIDER NOTES
Encounter Date: 2/7/2023       History     Chief Complaint   Patient presents with    Shortness of Breath     Covid last month, hx afib     The patient is a 67-year-old male who presents to the emergency department with shortness of breath.  He states that he had COVID 1 month ago.  Since then, he states that his shortness of breath has been constant.  It is associated with some coughing.  He has also had some intermittent fevers, although none recently.  He denies any associated chest pain or palpitations.  He does report associated generalized weakness in a decrease in appetite.  He is unclear of the etiology.  The wife does report that nebulizer treatments have been successful in improving his symptoms periodically.  He has no other complaints.    Review of patient's allergies indicates:   Allergen Reactions    Ace inhibitors Swelling    Verapamil Other (See Comments)     Other reaction(s): Unknown    Povidone-iodine Itching     Past Medical History:   Diagnosis Date    (HFpEF) heart failure with preserved ejection fraction 9/25/2017    Atrial fibrillation     CAD (coronary artery disease)     CHF (congestive heart failure)     Chronic diastolic heart failure 4/8/2020    CKD (chronic kidney disease) stage 3, GFR 30-59 ml/min     Dr. Latif    CKD (chronic kidney disease) stage 3, GFR 30-59 ml/min     Diverticulosis     Fever blister     Heart attack 2006    Hyperlipidemia     Hypertension     Immunodeficiency due to treatment with immunosuppressive medication     Keloid cicatrix     Left lower quadrant abdominal pain 6/5/2022    Metabolic bone disease     Pericarditis     S/P kidney transplant     Supraventricular tachycardia 06/2019    Thrombocytopenia     Thyroid disease     Tobacco use 9/5/2014    Unspecified disorder of kidney and ureter     Stage IIICKD     Past Surgical History:   Procedure Laterality Date    CARDIOVERSION  04/30/15    CHOLECYSTECTOMY      COLONOSCOPY  April 20, 2011    Diverticulosis,  "repeat recommended in 3 yrs., repeat colonoscopy 2014 revealed 2 polyps.  He should return in 5 years.    COLONOSCOPY N/A 3/13/2020    Procedure: COLONOSCOPY;  Surgeon: Chon Casper MD;  Location: [facility] (4TH FLR);  Service: Endoscopy;  Laterality: N/A;  ok to hold coumadin x5days-see telephone encounter 2/4/20-tb    COLONOSCOPY N/A 2/4/2021    Procedure: COLONOSCOPY;  Surgeon: Chon Casper MD;  Location: [facility] (4TH FLR);  Service: Endoscopy;  Laterality: N/A;  Eliquis - per Dr. GAVIN Blunt ok to hold x 2 days and "restarted asap"- ERW  last prep "poor", [id number] ordered/ Pt requests Dr. Casper  prep ins. mailed - COVID screening on 2/1/21 PCW- ERW    COLONOSCOPY N/A 3/3/2021    Procedure: COLONOSCOPY;  Surgeon: Chon Casper MD;  Location: [facility] (4TH FLR);  Service: Endoscopy;  Laterality: N/A;  Patient with his second poor bowel pre and has poor prep today.  If patient not intersted or can't get colonoscopy tomorrow will need constipation bowel prep and will need to restart his Eliquis today.Thanks,Chon Blunt-ok to hold Eliquis 2 days prior      COVID     CORONARY ANGIOPLASTY WITH STENT PLACEMENT  9/13/2006    KIDNEY TRANSPLANT  2005    PARATHYROIDECTOMY      TREATMENT OF CARDIAC ARRHYTHMIA N/A 3/28/2022    Procedure: CARDIOVERSION;  Surgeon: Migue Blunt MD;  Location: [facility];  Service: Cardiology;  Laterality: N/A;  AF, DCC, MAC, GP, 3 PREP*RODRIGO deferred pt 100% compliant*     Family History   Problem Relation Age of Onset    No Known Problems Sister     No Known Problems Brother     Thyroid disease Mother         s/p surgery    Heart disease Father         had pacemaker    No Known Problems Sister     Kidney failure Sister     Kidney disease Sister     No Known Problems Sister     Kidney disease Brother     Kidney failure Brother         s/p transplant    Diabetes Mellitus Neg Hx     Stroke Neg Hx     Heart attack Neg Hx     Cancer Neg Hx     Celiac disease Neg Hx     " Cirrhosis Neg Hx     Colon cancer Neg Hx     Esophageal cancer Neg Hx     Inflammatory bowel disease Neg Hx     Rectal cancer Neg Hx     Stomach cancer Neg Hx     Ulcerative colitis Neg Hx     Liver disease Neg Hx     Liver cancer Neg Hx     Crohn's disease Neg Hx     Melanoma Neg Hx      Social History     Tobacco Use    Smoking status: Former     Packs/day: 0.50     Years: 40.00     Pack years: 20.00     Types: Cigarettes     Quit date: 2022     Years since quittin.9    Smokeless tobacco: Never    Tobacco comments:     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     Patient is currently retired   Substance Use Topics    Alcohol use: No    Drug use: No     Review of Systems  General:  Reports intermittent fever.  Denies chills.  Reports generalized weakness.  HENT: Denies sore throat.  Denies earache.  Denies rhinorrhea.  Eyes: Denies visual changes.  Denies eye pain.  Denies drainage.  Cardiovascular: Denies chest pain.  Reports shortness of breath.  Denies orthopnea.  Reports dyspnea on exertion.  Respiratory:  Reports shortness of breath.  Denies wheezing.  Reports coughing.  GI: Denies abdominal pain.  Denies nausea.  Denies vomiting.  Denies diarrhea.  Denies constipation.  Denies melena.  Denies hematochezia.  Reports decrease in appetite.  : Denies dysuria.  Denies hematuria.  Denies pelvic pain.  Denies swelling.  Skin: Denies rashes.  Denies lesions.  Denies pallor.  Neuro: Denies headache.  Denies head trauma.  Denies numbness.  Denies focal weakness.  Musculoskeletal: Denies neck pain.  Denies back pain.  Denies extremity pain.  Denies extremity swelling.        Physical Exam     Initial Vitals [23 1102]   BP Pulse Resp Temp SpO2   (!) 105/58 101 (!) 32 98.3 °F (36.8 °C) 95 %      MAP       --         Physical Exam  General: No apparent distress.  Well-nourished.  Well-developed.  Alert and oriented x3.  HENT:  Dry mucous membranes.  Normocephalic  atraumatic.  Oropharynx clear.    Eyes: Pupils equally round and reactive to light.  Extraocular movements intact.  No scleral icterus.  No conjunctival pallor.  Cardiovascular:  Mild tachycardia noted.  No murmurs, rubs, or gallops.  Brisk capillary fill.  2+ distal pulses.  Respiratory: Clear to auscultation bilaterally.  No wheezes, rales, or rhonchi.  Mild respiratory distress with tachypnea.  No accessory muscle use noted.  Abdomen: Soft.  Nontender.  Nondistended.  No guarding.  No rebound.  No masses.  No abdominal bruit auscultated.  Skin: No rashes.  No lesions.  No pallor.  No jaundice.  Neuro: Cranial nerves II through XII grossly intact.  Moving all extremities equally.  No sensory deficits.  Strength 5 out of 5 in all 4 extremities.  Musculoskeletal: Neck supple.  No extremity tenderness.  Moving all extremities without pain.  No back tenderness.  No neck tenderness.  Negative Homans sign.  No palpable cord.  No calf tenderness.      ED Course   Procedures  Labs Reviewed   CBC W/ AUTO DIFFERENTIAL - Abnormal; Notable for the following components:       Result Value    RBC 4.15 (*)     Hemoglobin 9.7 (*)     Hematocrit 32.3 (*)     MCV 78 (*)     MCH 23.4 (*)     MCHC 30.0 (*)     RDW 15.5 (*)     Immature Granulocytes 1.4 (*)     Immature Grans (Abs) 0.06 (*)     Lymph # 0.3 (*)     Gran % 82.0 (*)     Lymph % 7.4 (*)     All other components within normal limits   COMPREHENSIVE METABOLIC PANEL - Abnormal; Notable for the following components:    CO2 15 (*)     BUN 37 (*)     Creatinine 3.5 (*)     Calcium 7.2 (*)     Albumin 3.0 (*)     eGFR 18.2 (*)     All other components within normal limits   TROPONIN I - Abnormal; Notable for the following components:    Troponin I 0.062 (*)     All other components within normal limits   B-TYPE NATRIURETIC PEPTIDE - Abnormal; Notable for the following components:     (*)     All other components within normal limits   SARS-COV2 (COVID) WITH FLU/RSV BY PCR  - Abnormal; Notable for the following components:    SARS-CoV2 (COVID-19) Qualitative PCR Detected (*)     All other components within normal limits   CULTURE, BLOOD   CULTURE, BLOOD   LACTIC ACID, PLASMA        ECG Results              EKG 12-lead (Final result)  Result time 02/07/23 12:21:18      Final result by Interface, Lab In [facility] (02/07/23 12:21:18)                   Narrative:    Test Reason : R68.89,    Vent. Rate : 083 BPM     Atrial Rate : 083 BPM     P-R Int : 194 ms          QRS Dur : 096 ms      QT Int : 406 ms       P-R-T Axes : 080 046 202 degrees     QTc Int : 477 ms    Sinus rhythm with Premature atrial complexes  LVH with repolarization abnormality ( [place] product )  Abnormal ECG  When compared with ECG of 12-JAN-2023 12:15,  Premature atrial complexes are now Present  QT has lengthened  Confirmed by FELY ZEPEDA MD ([id number]) on 2/7/2023 12:21:10 PM    Referred By:             Confirmed By:FELY ZEPEDA MD                                  Imaging Results              X-Ray Chest PA And Lateral (Final result)  Result time 02/07/23 12:55:06      Final result by Bryon Leroy MD (02/07/23 12:55:06)                   Impression:      No significant intrathoracic abnormality.  Allowing for a poorer inspiratory depth level on the current examination, there has been no significant detrimental interval change in the appearance of the chest since the examinations referenced above.      Electronically signed by: Bryon Leroy MD  Date:    02/07/2023  Time:    12:55               Narrative:    EXAMINATION:  XR CHEST PA AND LATERAL    CLINICAL HISTORY:  Chest Pain;    TECHNIQUE:  Two views of the chest were obtained, with PA and lateral projections submitted.    COMPARISON:  Comparison is made to 01/12/2023 and 06/05/2022.  Clinical information of shortness of breath.    FINDINGS:  Heart size is no larger than upper limit of normal, and the appearance of the cardiomediastinal silhouette has not changed  appreciably since the examinations referenced above.  Pulmonary vascularity is within normal limits, and there are no findings indicating current cardiac decompensation.  Lung zones appear clear, and are free of significant airspace consolidation or volume loss.  No pleural fluid of any substantial volume is seen on either side.  No abnormal mediastinal widening.  No pneumothorax.  Minor deformity relating to the lateral aspect of the left 7th rib has the appearance of an old healed fracture.                                       Medications   albuterol-ipratropium 2.5 mg-0.5 mg/3 mL nebulizer solution 3 mL (3 mLs Nebulization Given 2/7/23 1235)   sodium chloride 0.9% bolus 2,397 mL 2,397 mL (0 mLs Intravenous Stopped 2/7/23 1434)     Medical Decision Making:   Initial Assessment:   This is an emergent evaluation of a critically ill patient.  The patient is tachypneic, tachycardic, and mildly hypotensive with intermittent fever and shortness of breath.  Although he has no fever currently, I do have concern for pneumonia and sepsis.  Laboratory studies, chest x-ray, EKG, and tests for COVID, RSV, and flu have been ordered.  Because of the patient's vital sign abnormalities and symptoms, I will also provide broad-spectrum antibiotics and IV fluid resuscitation.  Nebulizer treatment has been ordered as well.  I have significant concern for the patient's clinical status.  I will reassess.  ED Management:  1:41 p.m.  The patient states that his shortness of breath improved some with the nebulizer treatment.  He is noted to have acute kidney injury.  He is receiving IV fluids.  I believe that this may be due to dehydration.  He also has a mildly elevated troponin.  However, it is consistent with previous troponin results.  The BNP is mildly elevated.  I do not know the significance of either of these lab results.  COVID, RSV, and flu are pending.  Because of the patient's significant generalized weakness and KATINA, I do  feel that he will benefit from observation in the hospital.  Troponin can also be trended during his stay.  Once his laboratory studies fully result, I will discuss with Hospital Medicine.    2:32 p.m.  The patient is COVID positive.  Broad-spectrum antibiotics have been stopped.  IV fluids have also been stopped after the patient received a large amount of it.  The patient continues to appear very weak.  I will discuss with Hospital Medicine.    2:44 p.m.   The kidney transplant medicine service has been paged to discuss whether the patient meets criteria for antivirals secondary to his acute kidney injury on top of his chronic renal insufficiency and previous history of kidney transplant.  Hospital Medicine has agreed to evaluate and admit the patient.    2:51 p.m.  I discussed this case with kidney transplant medicine.  They state that they would like Infectious Disease to be consulted to make a decision on antivirals, as the patient is very complicated and has had COVID for over 1 month.                        Clinical Impression:   Final diagnoses:  [R68.89] Multiple complaints  [R06.02] Shortness of breath  [U07.1] COVID-19 virus infection  [N17.9] KATINA (acute kidney injury) (Primary)  [R53.1] Generalized weakness        ED Disposition Condition    Admit Fair                Cristopher Martin MD  02/07/23 [id number]

## 2023-02-07 NOTE — ASSESSMENT & PLAN NOTE
- continue daily asa, statin  - trop 0.062 (baseline); no c/o chest pain   - EKG without concern for ischemia

## 2023-02-07 NOTE — ASSESSMENT & PLAN NOTE
Cr 3.5, baseline ~2.8   · Likely pre-renal from dehydration and volume losses in setting of COVID and diarrhea  · If no improvement with conservative measures overnight can further work up  · Consider urine lytes and renal ultrasound  · Strict I&Os  ·  IVF  · Avoid Nephrotoxic agents  · KTM consulted, appreciate assistance

## 2023-02-07 NOTE — ASSESSMENT & PLAN NOTE
Long term AC     Patient with Paroxysmal (<7 days) atrial fibrillation which is controlled currently with Beta Blocker and flecainide. Patient is currently in sinus rhythm.[id number] Score: 3.  Anticoagulation indicated. Anticoagulation done with eliquis; will continue  - tele

## 2023-02-07 NOTE — ASSESSMENT & PLAN NOTE
Immunosuppression therapy     - KTM consulted, appreciate assistance   - continue tacrolimus  - hold prednisone while on dexamethasone. Hold cellcept.   - check tacro level

## 2023-02-07 NOTE — ASSESSMENT & PLAN NOTE
Endorses diarrhea since covid infection 1 month ago   - check stool studies   - fluid repletion in ED  - check daily bmp, pohs and mag

## 2023-02-07 NOTE — SUBJECTIVE & OBJECTIVE
"Past Medical History:   Diagnosis Date    (HFpEF) heart failure with preserved ejection fraction 9/25/2017    Atrial fibrillation     CAD (coronary artery disease)     CHF (congestive heart failure)     Chronic diastolic heart failure 4/8/2020    CKD (chronic kidney disease) stage 3, GFR 30-59 ml/min     Dr. Latif    CKD (chronic kidney disease) stage 3, GFR 30-59 ml/min     Diverticulosis     Fever blister     Heart attack 2006    Hyperlipidemia     Hypertension     Immunodeficiency due to treatment with immunosuppressive medication     Keloid cicatrix     Left lower quadrant abdominal pain 6/5/2022    Metabolic bone disease     Pericarditis     S/P kidney transplant     Supraventricular tachycardia 06/2019    Thrombocytopenia     Thyroid disease     Tobacco use 9/5/2014    Unspecified disorder of kidney and ureter     Stage IIICKD       Past Surgical History:   Procedure Laterality Date    CARDIOVERSION  04/30/15    CHOLECYSTECTOMY      COLONOSCOPY  April 20, 2011    Diverticulosis, repeat recommended in 3 yrs., repeat colonoscopy 2014 revealed 2 polyps.  He should return in 5 years.    COLONOSCOPY N/A 3/13/2020    Procedure: COLONOSCOPY;  Surgeon: Chon Casper MD;  Location: [facility] (4TH FLR);  Service: Endoscopy;  Laterality: N/A;  ok to hold coumadin x5days-see telephone encounter 2/4/20-tb    COLONOSCOPY N/A 2/4/2021    Procedure: COLONOSCOPY;  Surgeon: Chon Casper MD;  Location: [facility] (4TH FLR);  Service: Endoscopy;  Laterality: N/A;  Eliquis - per Dr. GAVIN Blunt ok to hold x 2 days and "restarted asap"- ERW  last prep "poor", [id number] ordered/ Pt requests Dr. Casper  prep ins. mailed - COVID screening on 2/1/21 PCW- ERW    COLONOSCOPY N/A 3/3/2021    Procedure: COLONOSCOPY;  Surgeon: Chon Casper MD;  Location: [facility] (4TH FLR);  Service: Endoscopy;  Laterality: N/A;  Patient with his second poor bowel pre and has poor prep today.  If patient not intersted or can't get colonoscopy " tomorrow will need constipation bowel prep and will need to restart his Eliquis today.Thanks,Chon     per Dr Blunt-ok to hold Eliquis 2 days prior      COVID     CORONARY ANGIOPLASTY WITH STENT PLACEMENT  9/13/2006    KIDNEY TRANSPLANT  2005    PARATHYROIDECTOMY      TREATMENT OF CARDIAC ARRHYTHMIA N/A 3/28/2022    Procedure: CARDIOVERSION;  Surgeon: Migue Blunt MD;  Location: [facility];  Service: Cardiology;  Laterality: N/A;  AF, DCC, MAC, GP, 3 PREP*RODRIGO deferred pt 100% compliant*       Review of patient's allergies indicates:   Allergen Reactions    Ace inhibitors Swelling    Verapamil Other (See Comments)     Other reaction(s): Unknown    Povidone-iodine Itching       No current facility-administered medications on file prior to encounter.     Current Outpatient Medications on File Prior to Encounter   Medication Sig    acetaminophen (TYLENOL) 500 MG tablet Take 1 tablet (500 mg total) by mouth every 6 (six) hours as needed for Pain.    albuterol (PROVENTIL) 2.5 mg /3 mL (0.083 %) nebulizer solution Take 3 mLs (2.5 mg total) by nebulization every 6 (six) hours as needed for Wheezing. Rescue    albuterol (VENTOLIN HFA) 90 mcg/actuation inhaler Inhale 2 puffs into the lungs every 6 (six) hours as needed for Wheezing or Shortness of Breath. Rescue    ammonium lactate 12 % Crea Apply 1 g topically once daily.    aspirin (ECOTRIN) 81 MG EC tablet Take 1 tablet by mouth Daily.    atorvastatin (LIPITOR) 10 MG tablet Take 10 mg by mouth.    calcium carbonate (CALCIUM 600 ORAL) Take by mouth. Pt taking 1200 daily    clonazePAM (KLONOPIN) 0.5 MG tablet Take 1 tablet (0.5 mg total) by mouth 2 (two) times daily as needed for Anxiety. (Patient not taking: Reported on 1/5/2023)    colchicine (MITIGARE) 0.6 mg Cap One po BID prn gout flare up to 3 days    cyclobenzaprine (FLEXERIL) 5 MG tablet Take 5 mg by mouth 2 (two) times daily as needed.    doxazosin (CARDURA) 4 MG tablet TAKE 1 TABLET (4 MG TOTAL) BY MOUTH EVERY  12 (TWELVE) HOURS.    ELIQUIS 5 mg Tab TAKE 1 TABLET BY MOUTH TWICE A DAY    famotidine (PEPCID) 20 MG tablet TAKE 1 TABLET TWICE DAILY    fexofenadine (ALLEGRA) 60 MG tablet Take 1 tablet by mouth Twice daily as needed.    flecainide (TAMBOCOR) 50 MG Tab Take 1 tablet (50 mg total) by mouth every 12 (twelve) hours.    FLUAD QUAD 2022-23,65Y UP,,PF, 60 mcg (15 mcg x 4)/0.5 mL Syrg     fluticasone propion-salmeterol 115-21 mcg/dose (ADVAIR HFA) 115-21 mcg/actuation HFAA inhaler Inhale 2 puffs into the lungs every 12 (twelve) hours. Controller    fluticasone propionate (FLONASE) 50 mcg/actuation nasal spray 1 spray (50 mcg total) by Each Nostril route once daily.    furosemide (LASIX) 40 MG tablet TAKE 1/2 TABLET TWICE DAILY    gabapentin (NEURONTIN) 300 MG capsule TAKE 1 CAPSULE BY MOUTH TWICE A DAY    hydrALAZINE (APRESOLINE) 50 MG tablet TAKE 1 TABLET (50 MG TOTAL) BY MOUTH 3 (THREE) TIMES DAILY.    hyoscyamine (LEVSIN/SL) 0.125 mg Subl Place 1 tablet (0.125 mg total) under the tongue every 4 (four) hours as needed.    KLOR-CON 10 10 mEq TbSR TAKE 1 TABLET BY MOUTH EVERY DAY    LIDOcaine (LIDODERM) 5 % Place 1 patch onto the skin daily as needed (back pain). Remove & Discard patch within 12 hours or as directed by MD    metoprolol succinate (TOPROL-XL) 25 MG 24 hr tablet Take 1 tablet (25 mg total) by mouth once daily.    multivitamin (THERAGRAN) per tablet Take 1 tablet by mouth Daily.    mycophenolate (CELLCEPT) 250 mg Cap Take 2 capsules (500 mg total) by mouth 2 (two) times daily.    NIFEdipine (PROCARDIA-XL) 60 MG (OSM) 24 hr tablet Take 1 tablet (60 mg total) by mouth 2 (two) times a day.    omeprazole (PRILOSEC) 20 MG capsule Take 1 capsule (20 mg total) by mouth once daily.    paricalcitoL (ZEMPLAR) 1 MCG capsule TAKE 1 CAPSULE ON MONDAY, WEDNESDAY AND FRIDAY    PFIZER COVID BIVAL,12Y UP,,PF, 30 mcg/0.3 mL injection     predniSONE (DELTASONE) 5 MG tablet TAKE 1 TABLET BY MOUTH EVERY DAY IN THE MORNING     spironolactone (ALDACTONE) 25 MG tablet TAKE 1 TABLET EVERY DAY    tacrolimus (PROGRAF) 0.5 MG Cap Take by mouth.    tacrolimus (PROGRAF) 1 MG Cap Two po in am and two po in pm    vitamin D 1000 units Tab Take 370 mg by mouth 2 (two) times daily. 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     Patient is currently retired   Substance and Sexual Activity    Alcohol use: No    Drug use: No    Sexual activity: Yes     Partners: Female     Review of Systems   Constitutional:  Positive for activity change, appetite change and fever. Negative for chills, diaphoresis and fatigue.   HENT:  Positive for voice change. Negative for congestion, facial swelling, rhinorrhea, sore throat and trouble swallowing.    Eyes:  Negative for photophobia, itching and visual disturbance.   Respiratory:  Positive for shortness of breath. Negative for cough, chest tightness and wheezing.    Cardiovascular:  Negative for chest pain, palpitations and leg swelling.   Gastrointestinal:  Positive for diarrhea. Negative for abdominal distention, abdominal pain, blood in stool, constipation, nausea and vomiting.   Genitourinary:  Negative for difficulty urinating, dysuria, frequency, hematuria and urgency.   Musculoskeletal:  Positive for back pain. Negative for arthralgias, gait problem and neck stiffness.   Skin:  Negative for color change and rash.   Neurological:  Negative for dizziness, tremors, seizures, syncope, weakness, light-headedness,  numbness and headaches.   Psychiatric/Behavioral:  Negative for agitation, confusion and hallucinations. The patient is not nervous/anxious.    Objective:     Vital Signs (Most Recent):  Temp: 98.3 °F (36.8 °C) (02/07/23 1102)  Pulse: 79 (02/07/23 1505)  Resp: 16 (02/07/23 1505)  BP: 139/66 (02/07/23 1505)  SpO2: 99 % (02/07/23 1505) Vital Signs (24h Range):  Temp:  [98.3 °F (36.8 °C)] 98.3 °F (36.8 °C)  Pulse:  [] 79  Resp:  [10-32] 16  SpO2:  [95 %-99 %] 99 %  BP: (105-147)/(58-68) 139/66     Weight: 107.5 kg (237 lb)  Body mass index is 31.27 kg/m².    Physical Exam  Vitals and nursing note reviewed.   Constitutional:       General: He is not in acute distress.     Appearance: He is well-developed. He is ill-appearing.   HENT:      Head: Normocephalic and atraumatic.      Nose: No congestion or rhinorrhea.      Mouth/Throat:      Mouth: Mucous membranes are moist.      Pharynx: Oropharynx is clear. No oropharyngeal exudate.   Eyes:      Conjunctiva/sclera: Conjunctivae normal.      Pupils: Pupils are equal, round, and reactive to light.   Cardiovascular:      Rate and Rhythm: Normal rate and regular rhythm.      Heart sounds: Normal heart sounds.   Pulmonary:      Effort: Pulmonary effort is normal. No respiratory distress.      Breath sounds: Normal breath sounds. No wheezing.      Comments: Lungs clear, comfortable on room air at rest   Abdominal:      General: Bowel sounds are normal. There is no distension.      Palpations: Abdomen is soft.      Tenderness: There is no abdominal tenderness.   Musculoskeletal:         General: No tenderness. Normal range of motion.      Cervical back: Normal range of motion and neck supple.      Right lower leg: No edema.      Left lower leg: No edema.   Lymphadenopathy:      Cervical: No cervical adenopathy.   Skin:     General: Skin is warm and dry.      Capillary Refill: Capillary refill takes less than 2 seconds.      Findings: No rash.   Neurological:      Mental  Status: He is alert and oriented to person, place, and time. Mental status is at baseline.      Cranial Nerves: No cranial nerve deficit.      Sensory: No sensory deficit.      Coordination: Coordination normal.      Comments: tremulous   Psychiatric:         Behavior: Behavior normal.         Thought Content: Thought content normal.         Judgment: Judgment normal.         CRANIAL NERVES     CN III, IV, VI   Pupils are equal, round, and reactive to light.     Significant Labs: All pertinent labs within the past 24 hours have been reviewed.  CBC:   Recent Labs   Lab 02/07/23  1217   WBC 4.44   HGB 9.7*   HCT 32.3*        CMP:   Recent Labs   Lab 02/07/23  1217      K 5.0      CO2 15*      BUN 37*   CREATININE 3.5*   CALCIUM 7.2*   PROT 6.5   ALBUMIN 3.0*   BILITOT 0.3   ALKPHOS 73   AST 29   ALT 25   ANIONGAP 14     Lactic Acid:   Recent Labs   Lab 02/07/23  1217   LACTATE 1.3       Significant Imaging: I have reviewed all pertinent imaging results/findings within the past 24 hours.    Imaging Results              X-Ray Chest PA And Lateral (Final result)  Result time 02/07/23 12:55:06      Final result by Bryon Leroy MD (02/07/23 12:55:06)                   Impression:      No significant intrathoracic abnormality.  Allowing for a poorer inspiratory depth level on the current examination, there has been no significant detrimental interval change in the appearance of the chest since the examinations referenced above.      Electronically signed by: Bryon Leroy MD  Date:    02/07/2023  Time:    12:55               Narrative:    EXAMINATION:  XR CHEST PA AND LATERAL    CLINICAL HISTORY:  Chest Pain;    TECHNIQUE:  Two views of the chest were obtained, with PA and lateral projections submitted.    COMPARISON:  Comparison is made to 01/12/2023 and 06/05/2022.  Clinical information of shortness of breath.    FINDINGS:  Heart size is no larger than upper limit of normal, and the appearance of  the cardiomediastinal silhouette has not changed appreciably since the examinations referenced above.  Pulmonary vascularity is within normal limits, and there are no findings indicating current cardiac decompensation.  Lung zones appear clear, and are free of significant airspace consolidation or volume loss.  No pleural fluid of any substantial volume is seen on either side.  No abnormal mediastinal widening.  No pneumothorax.  Minor deformity relating to the lateral aspect of the left 7th rib has the appearance of an old healed fracture.

## 2023-02-07 NOTE — ASSESSMENT & PLAN NOTE
Patient's anemia is currently controlled. Baseline.   Current CBC reviewed-   Lab Results   Component Value Date    HGB 9.7 (L) 02/07/2023    HCT 32.3 (L) 02/07/2023     Monitor serial CBC and transfuse if patient becomes hemodynamically unstable, symptomatic or H/H drops below 7/21.

## 2023-02-07 NOTE — H&P
[facility] - Emergency Dept  [facility] Medicine  History & Physical    Patient Name: Ibrahima Phelps  MRN: [id number]  Patient Class: IP- Inpatient  Admission Date: 2/7/2023  Attending Physician: Viola Portillo MD   Primary Care Provider: Connie Magdaleno MD         Patient information was obtained from patient, spouse/SO and ER records.     Subjective:     Principal Problem:Acute renal failure superimposed on stage 4 chronic kidney disease    Chief Complaint:   Chief Complaint   Patient presents with    Shortness of Breath     Covid last month, hx afib        HPI: Ibrahima Phelps is a 68 yo male with HTN, HLD, CAD, CHF, CKD4 s/o kidney transplant on chronic immunosuppressive therapy, who presents to the emergency department with persistent DAUGHERTY since having COVID 1 month ago. He came to the ED at that time, but was discharge home. Since then, he becomes severely fatigued and SOB after walking minimal distances. He endorses associated coughing, intermittent headaches, intermittent hoarseness.  Endorses daily diarrhea for the past month, and decreased appetite. He has also had some intermittent fevers, although none recently.  He denies any associated chest pain or palpitations, wheezing, congestion, abdominal pain, N/V, lightheadedness, weakness, falls, urinary symptoms. The wife does report that nebulizer treatments have been successful in improving his symptoms.     In the ED: , otherwise VSS. Maintaining sats on room air. CBC with stable chronic anemia. CMP with bicarb 15 (anion gap wnl), BUN 37/ Cr 3.5 (baseline 2.8). , trop 0.062 - both baseline. EKG without ischemia. Lactate 1.3. Positive for covid. CXR without acute process. Given duo-nebs x1, 30cc/kg NS resuscitation and IV zosyn.       Past Medical History:   Diagnosis Date    (HFpEF) heart failure with preserved ejection fraction 9/25/2017    Atrial fibrillation     CAD (coronary artery disease)     CHF (congestive heart failure)     Chronic diastolic  "heart failure 4/8/2020    CKD (chronic kidney disease) stage 3, GFR 30-59 ml/min     Dr. Latif    CKD (chronic kidney disease) stage 3, GFR 30-59 ml/min     Diverticulosis     Fever blister     Heart attack 2006    Hyperlipidemia     Hypertension     Immunodeficiency due to treatment with immunosuppressive medication     Keloid cicatrix     Left lower quadrant abdominal pain 6/5/2022    Metabolic bone disease     Pericarditis     S/P kidney transplant     Supraventricular tachycardia 06/2019    Thrombocytopenia     Thyroid disease     Tobacco use 9/5/2014    Unspecified disorder of kidney and ureter     Stage IIICKD       Past Surgical History:   Procedure Laterality Date    CARDIOVERSION  04/30/15    CHOLECYSTECTOMY      COLONOSCOPY  April 20, 2011    Diverticulosis, repeat recommended in 3 yrs., repeat colonoscopy 2014 revealed 2 polyps.  He should return in 5 years.    COLONOSCOPY N/A 3/13/2020    Procedure: COLONOSCOPY;  Surgeon: Chon Casper MD;  Location: [facility] (4TH FLR);  Service: Endoscopy;  Laterality: N/A;  ok to hold coumadin x5days-see telephone encounter 2/4/20-tb    COLONOSCOPY N/A 2/4/2021    Procedure: COLONOSCOPY;  Surgeon: Chon Casper MD;  Location: [facility] (4TH FLR);  Service: Endoscopy;  Laterality: N/A;  Eliquis - per Dr. GAVIN Blunt ok to hold x 2 days and "restarted asap"- ERW  last prep "poor", [id number] ordered/ Pt requests Dr. Casper  prep ins. mailed - COVID screening on 2/1/21 PCW- ERW    COLONOSCOPY N/A 3/3/2021    Procedure: COLONOSCOPY;  Surgeon: Chon Casper MD;  Location: [facility] (4TH FLR);  Service: Endoscopy;  Laterality: N/A;  Patient with his second poor bowel pre and has poor prep today.  If patient not intersted or can't get colonoscopy tomorrow will need constipation bowel prep and will need to restart his Eliquis today.Thanks,Chon     per Dr Blunt-ok to hold Eliquis 2 days prior      COVID     CORONARY ANGIOPLASTY WITH STENT " PLACEMENT  9/13/2006    KIDNEY TRANSPLANT  2005    PARATHYROIDECTOMY      TREATMENT OF CARDIAC ARRHYTHMIA N/A 3/28/2022    Procedure: CARDIOVERSION;  Surgeon: Migue Blunt MD;  Location: [facility];  Service: Cardiology;  Laterality: N/A;  AF, DCC, MAC, GP, 3 PREP*RODRIGO deferred pt 100% compliant*       Review of patient's allergies indicates:   Allergen Reactions    Ace inhibitors Swelling    Verapamil Other (See Comments)     Other reaction(s): Unknown    Povidone-iodine Itching       No current facility-administered medications on file prior to encounter.     Current Outpatient Medications on File Prior to Encounter   Medication Sig    acetaminophen (TYLENOL) 500 MG tablet Take 1 tablet (500 mg total) by mouth every 6 (six) hours as needed for Pain.    albuterol (PROVENTIL) 2.5 mg /3 mL (0.083 %) nebulizer solution Take 3 mLs (2.5 mg total) by nebulization every 6 (six) hours as needed for Wheezing. Rescue    albuterol (VENTOLIN HFA) 90 mcg/actuation inhaler Inhale 2 puffs into the lungs every 6 (six) hours as needed for Wheezing or Shortness of Breath. Rescue    ammonium lactate 12 % Crea Apply 1 g topically once daily.    aspirin (ECOTRIN) 81 MG EC tablet Take 1 tablet by mouth Daily.    atorvastatin (LIPITOR) 10 MG tablet Take 10 mg by mouth.    calcium carbonate (CALCIUM 600 ORAL) Take by mouth. Pt taking 1200 daily    clonazePAM (KLONOPIN) 0.5 MG tablet Take 1 tablet (0.5 mg total) by mouth 2 (two) times daily as needed for Anxiety. (Patient not taking: Reported on 1/5/2023)    colchicine (MITIGARE) 0.6 mg Cap One po BID prn gout flare up to 3 days    cyclobenzaprine (FLEXERIL) 5 MG tablet Take 5 mg by mouth 2 (two) times daily as needed.    doxazosin (CARDURA) 4 MG tablet TAKE 1 TABLET (4 MG TOTAL) BY MOUTH EVERY 12 (TWELVE) HOURS.    ELIQUIS 5 mg Tab TAKE 1 TABLET BY MOUTH TWICE A DAY    famotidine (PEPCID) 20 MG tablet TAKE 1 TABLET TWICE DAILY    fexofenadine (ALLEGRA) 60 MG tablet  Take 1 tablet by mouth Twice daily as needed.    flecainide (TAMBOCOR) 50 MG Tab Take 1 tablet (50 mg total) by mouth every 12 (twelve) hours.    FLUAD QUAD 2022-23,65Y UP,,PF, 60 mcg (15 mcg x 4)/0.5 mL Syrg     fluticasone propion-salmeterol 115-21 mcg/dose (ADVAIR HFA) 115-21 mcg/actuation HFAA inhaler Inhale 2 puffs into the lungs every 12 (twelve) hours. Controller    fluticasone propionate (FLONASE) 50 mcg/actuation nasal spray 1 spray (50 mcg total) by Each Nostril route once daily.    furosemide (LASIX) 40 MG tablet TAKE 1/2 TABLET TWICE DAILY    gabapentin (NEURONTIN) 300 MG capsule TAKE 1 CAPSULE BY MOUTH TWICE A DAY    hydrALAZINE (APRESOLINE) 50 MG tablet TAKE 1 TABLET (50 MG TOTAL) BY MOUTH 3 (THREE) TIMES DAILY.    hyoscyamine (LEVSIN/SL) 0.125 mg Subl Place 1 tablet (0.125 mg total) under the tongue every 4 (four) hours as needed.    KLOR-CON 10 10 mEq TbSR TAKE 1 TABLET BY MOUTH EVERY DAY    LIDOcaine (LIDODERM) 5 % Place 1 patch onto the skin daily as needed (back pain). Remove & Discard patch within 12 hours or as directed by MD    metoprolol succinate (TOPROL-XL) 25 MG 24 hr tablet Take 1 tablet (25 mg total) by mouth once daily.    multivitamin (THERAGRAN) per tablet Take 1 tablet by mouth Daily.    mycophenolate (CELLCEPT) 250 mg Cap Take 2 capsules (500 mg total) by mouth 2 (two) times daily.    NIFEdipine (PROCARDIA-XL) 60 MG (OSM) 24 hr tablet Take 1 tablet (60 mg total) by mouth 2 (two) times a day.    omeprazole (PRILOSEC) 20 MG capsule Take 1 capsule (20 mg total) by mouth once daily.    paricalcitoL (ZEMPLAR) 1 MCG capsule TAKE 1 CAPSULE ON MONDAY, WEDNESDAY AND FRIDAY    PFIZER COVID BIVAL,12Y UP,,PF, 30 mcg/0.3 mL injection     predniSONE (DELTASONE) 5 MG tablet TAKE 1 TABLET BY MOUTH EVERY DAY IN THE MORNING    spironolactone (ALDACTONE) 25 MG tablet TAKE 1 TABLET EVERY DAY    tacrolimus (PROGRAF) 0.5 MG Cap Take by mouth.    tacrolimus (PROGRAF) 1 MG Cap Two po in  am and two po in pm    vitamin D 1000 units Tab Take 370 mg by mouth 2 (two) times daily. 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     Patient is currently retired   Substance and Sexual Activity    Alcohol use: No    Drug use: No    Sexual activity: Yes     Partners: Female     Review of Systems   Constitutional:  Positive for activity change, appetite change and fever. Negative for chills, diaphoresis and fatigue.   HENT:  Positive for voice change. Negative for congestion, facial swelling, rhinorrhea, sore throat and trouble swallowing.    Eyes:  Negative for photophobia, itching and visual disturbance.   Respiratory:  Positive for shortness of breath. Negative for cough, chest tightness and wheezing.    Cardiovascular:  Negative for chest pain, palpitations and leg swelling.   Gastrointestinal:  Positive for diarrhea. Negative for abdominal distention, abdominal pain, blood in stool, constipation, nausea and vomiting.   Genitourinary:  Negative for difficulty urinating, dysuria, frequency, hematuria and urgency.   Musculoskeletal:  Positive for back pain. Negative for arthralgias, gait problem and neck stiffness.   Skin:  Negative for color change and rash.   Neurological:  Negative for dizziness, tremors, seizures, syncope, weakness, light-headedness, numbness and headaches.   Psychiatric/Behavioral:  Negative for agitation, confusion and hallucinations. The patient is not nervous/anxious.     Objective:     Vital Signs (Most Recent):  Temp: 98.3 °F (36.8 °C) (02/07/23 1102)  Pulse: 79 (02/07/23 1505)  Resp: 16 (02/07/23 1505)  BP: 139/66 (02/07/23 1505)  SpO2: 99 % (02/07/23 1505) Vital Signs (24h Range):  Temp:  [98.3 °F (36.8 °C)] 98.3 °F (36.8 °C)  Pulse:  [] 79  Resp:  [10-32] 16  SpO2:  [95 %-99 %] 99 %  BP: (105-147)/(58-68) 139/66     Weight: 107.5 kg (237 lb)  Body mass index is 31.27 kg/m².    Physical Exam  Vitals and nursing note reviewed.   Constitutional:       General: He is not in acute distress.     Appearance: He is well-developed. He is ill-appearing.   HENT:      Head: Normocephalic and atraumatic.      Nose: No congestion or rhinorrhea.      Mouth/Throat:      Mouth: Mucous membranes are moist.      Pharynx: Oropharynx is clear. No oropharyngeal exudate.   Eyes:      Conjunctiva/sclera: Conjunctivae normal.      Pupils: Pupils are equal, round, and reactive to light.   Cardiovascular:      Rate and Rhythm: Normal rate and regular rhythm.      Heart sounds: Normal heart sounds.   Pulmonary:      Effort: Pulmonary effort is normal. No respiratory distress.      Breath sounds: Normal breath sounds. No wheezing.      Comments: Lungs clear, comfortable on room air at rest   Abdominal:      General: Bowel sounds are normal. There is no distension.      Palpations: Abdomen is soft.      Tenderness: There is no abdominal tenderness.   Musculoskeletal:         General: No tenderness. Normal range of motion.      Cervical back: Normal range of motion and neck supple.      Right lower leg: No edema.      Left lower leg: No edema.   Lymphadenopathy:      Cervical: No cervical adenopathy.   Skin:     General: Skin is warm and dry.      Capillary Refill: Capillary refill takes less than 2 seconds.      Findings: No rash.   Neurological:      Mental Status: He is alert and oriented to person, place, and time. Mental status is at baseline.      Cranial Nerves: No cranial nerve deficit.       Sensory: No sensory deficit.      Coordination: Coordination normal.      Comments: tremulous   Psychiatric:         Behavior: Behavior normal.         Thought Content: Thought content normal.         Judgment: Judgment normal.         CRANIAL NERVES     CN III, IV, VI   Pupils are equal, round, and reactive to light.     Significant Labs: All pertinent labs within the past 24 hours have been reviewed.  CBC:   Recent Labs   Lab 02/07/23  1217   WBC 4.44   HGB 9.7*   HCT 32.3*        CMP:   Recent Labs   Lab 02/07/23  1217      K 5.0      CO2 15*      BUN 37*   CREATININE 3.5*   CALCIUM 7.2*   PROT 6.5   ALBUMIN 3.0*   BILITOT 0.3   ALKPHOS 73   AST 29   ALT 25   ANIONGAP 14     Lactic Acid:   Recent Labs   Lab 02/07/23  1217   LACTATE 1.3       Significant Imaging: I have reviewed all pertinent imaging results/findings within the past 24 hours.    Imaging Results              X-Ray Chest PA And Lateral (Final result)  Result time 02/07/23 12:55:06      Final result by Bryon Leroy MD (02/07/23 12:55:06)                   Impression:      No significant intrathoracic abnormality.  Allowing for a poorer inspiratory depth level on the current examination, there has been no significant detrimental interval change in the appearance of the chest since the examinations referenced above.      Electronically signed by: Bryon Leroy MD  Date:    02/07/2023  Time:    12:55               Narrative:    EXAMINATION:  XR CHEST PA AND LATERAL    CLINICAL HISTORY:  Chest Pain;    TECHNIQUE:  Two views of the chest were obtained, with PA and lateral projections submitted.    COMPARISON:  Comparison is made to 01/12/2023 and 06/05/2022.  Clinical information of shortness of breath.    FINDINGS:  Heart size is no larger than upper limit of normal, and the appearance of the cardiomediastinal silhouette has not changed appreciably since the examinations referenced above.  Pulmonary vascularity is within normal  limits, and there are no findings indicating current cardiac decompensation.  Lung zones appear clear, and are free of significant airspace consolidation or volume loss.  No pleural fluid of any substantial volume is seen on either side.  No abnormal mediastinal widening.  No pneumothorax.  Minor deformity relating to the lateral aspect of the left 7th rib has the appearance of an old healed fracture.                                        Assessment/Plan:     * Acute renal failure superimposed on stage 4 chronic kidney disease   Cr 3.5, baseline ~2.8   · Likely pre-renal from dehydration and volume losses in setting of COVID and diarrhea  · If no improvement with conservative measures overnight can further work up  · Consider urine lytes and renal ultrasound  · Strict I&Os  ·  IVF  · Avoid Nephrotoxic agents  · KTM consulted, appreciate assistance               COVID-19  Patient is identified as High risk for severe complications of COVID 19 based on COVID risk score of 9   Initiate standard COVID protocols; COVID-19 testing ,Infection Control notification  and isolation- respiratory, contact and droplet per protocol    Diagnostics: (leukopenia, hyponatremia, hyperferritinemia, elevated troponin, elevated d-dimer, age, and comorbidities are significant predictors of poor clinical outcome)  CBC, CMP, Ferritin, CRP, Portable CXR, UA and culture, PTT and INR   - CXR reviewed - clear     Management: Initiate targeted therapy with Remdesivir, 200mg IV x1, followed by 100mg IV daily x5 days total and Dexamethasone PO/IV 6mg daily x10 days, Inhaled bronchodilators as needed for shortness of breath. and Continuous cardiac monitoring.   - Patient tested positive for covid 1/12/23, and tested positive again today 2/7/23.   - ID consulted for assistance with management given immunosuppression and prolonged symptoms.   - concern for sepsis in ED - given IVF and zosyn x1. Will hold off on continuing abx for now.   - check UA    - follow blood cultures     Anemia due to stage 4 chronic kidney disease  Patient's anemia is currently controlled. Baseline.   Current CBC reviewed-   Lab Results   Component Value Date    HGB 9.7 (L) 02/07/2023    HCT 32.3 (L) 02/07/2023     Monitor serial CBC and transfuse if patient becomes hemodynamically unstable, symptomatic or H/H drops below 7/21.         Kidney replaced by transplant  Immunosuppression therapy     - KTM consulted, appreciate assistance   - continue tacrolimus  - hold prednisone while on dexamethasone. Hold cellcept.   - check tacro level       Chronic diastolic heart failure  -  (baseline). No evidence of volume overload on exam. CXR clear.   - last echo reviewed    Results for orders placed during the hospital encounter of 03/21/22    Echo    Interpretation Summary  · Moderate left atrial enlargement.  · The left ventricle is normal in size with concentric remodeling and normal systolic function.  · The estimated ejection fraction is 55%.  · There are segmental left ventricular wall motion abnormalities : akinetic basal inferior wall.  · Indeterminate left ventricular diastolic function.  · Mild right atrial enlargement.  · Normal right ventricular size with normal right ventricular systolic function.  · There is mild aortic valve stenosis.  · Aortic valve area is 1.84 cm2; peak velocity is 2.60 m/s; mean gradient is 13 mmHg.  · Mild aortic regurgitation.  · Mild pulmonic regurgitation.  · The estimated PA systolic pressure is 25 mmHg.  · Normal central venous pressure (3 mmHg).        Paroxysmal atrial fibrillation  Long term AC     Patient with Paroxysmal (<7 days) atrial fibrillation which is controlled currently with Beta Blocker and flecainide. Patient is currently in sinus rhythm.[id number] Score: 3.  Anticoagulation indicated. Anticoagulation done with eliquis; will continue  - tele     Obesity (BMI 30-39.9)  Body mass index is 31.27 kg/m². Morbid obesity complicates all  aspects of disease management from diagnostic modalities to treatment. Weight loss encouraged and health benefits explained to patient.         Diarrhea  Endorses diarrhea since covid infection 1 month ago   - check stool studies   - fluid repletion in ED  - check daily bmp, pohs and mag     Mixed hyperlipidemia  - continue statin    CAD (coronary artery disease)  - continue daily asa, statin  - trop 0.062 (baseline); no c/o chest pain   - EKG without concern for ischemia       Hypertension  - BP stable   - continue toprol, aldactone, lasix, nifedipine, hydralazine         VTE Risk Mitigation (From admission, onward)         Ordered     apixaban tablet 5 mg  2 times daily         02/07/23 1505     IP VTE HIGH RISK PATIENT  Once         02/07/23 1505     Place sequential compression device  Until discontinued         02/07/23 1505     Place RASHAD hose  Until discontinued         02/07/23 1505                   Catherine Sarmiento PA-C  Department of Hospital Medicine   [facility] - Emergency Dept

## 2023-02-07 NOTE — HPI
Ibrahima Phelps is a 66 yo male with HTN, HLD, CAD, CHF, CKD4 s/o kidney transplant on chronic immunosuppressive therapy, who presents to the emergency department with persistent DAUGHERTY since having COVID 1 month ago. He came to the ED at that time, but was discharge home. Since then, he becomes severely fatigued and SOB after walking minimal distances. He endorses associated coughing, intermittent headaches, intermittent hoarseness.  Endorses daily diarrhea for the past month, and decreased appetite. He has also had some intermittent fevers, although none recently.  He denies any associated chest pain or palpitations, wheezing, congestion, abdominal pain, N/V, lightheadedness, weakness, falls, urinary symptoms. The wife does report that nebulizer treatments have been successful in improving his symptoms.     In the ED: , otherwise VSS. Maintaining sats on room air. CBC with stable chronic anemia. CMP with bicarb 15 (anion gap wnl), BUN 37/ Cr 3.5 (baseline 2.8). , trop 0.062 - both baseline. EKG without ischemia. Lactate 1.3. Positive for covid. CXR without acute process. Given duo-nebs x1, 30cc/kg NS resuscitation and IV zosyn.

## 2023-02-07 NOTE — ASSESSMENT & PLAN NOTE
Patient is identified as High risk for severe complications of COVID 19 based on COVID risk score of 9   Initiate standard COVID protocols; COVID-19 testing ,Infection Control notification  and isolation- respiratory, contact and droplet per protocol    Diagnostics: (leukopenia, hyponatremia, hyperferritinemia, elevated troponin, elevated d-dimer, age, and comorbidities are significant predictors of poor clinical outcome)  CBC, CMP, Ferritin, CRP, Portable CXR, UA and culture, PTT and INR   - CXR reviewed - clear     Management: Initiate targeted therapy with Remdesivir, 200mg IV x1, followed by 100mg IV daily x5 days total and Dexamethasone PO/IV 6mg daily x10 days, Inhaled bronchodilators as needed for shortness of breath. and Continuous cardiac monitoring.   - Patient tested positive for covid 1/12/23, and tested positive again today 2/7/23.   - ID consulted for assistance with management given immunosuppression and prolonged symptoms.   - concern for sepsis in ED - given IVF and zosyn x1. Will hold off on continuing abx for now.   - check UA   - follow blood cultures

## 2023-02-07 NOTE — ASSESSMENT & PLAN NOTE
-  (baseline). No evidence of volume overload on exam. CXR clear.   - last echo reviewed    Results for orders placed during the hospital encounter of 03/21/22    Echo    Interpretation Summary  · Moderate left atrial enlargement.  · The left ventricle is normal in size with concentric remodeling and normal systolic function.  · The estimated ejection fraction is 55%.  · There are segmental left ventricular wall motion abnormalities : akinetic basal inferior wall.  · Indeterminate left ventricular diastolic function.  · Mild right atrial enlargement.  · Normal right ventricular size with normal right ventricular systolic function.  · There is mild aortic valve stenosis.  · Aortic valve area is 1.84 cm2; peak velocity is 2.60 m/s; mean gradient is 13 mmHg.  · Mild aortic regurgitation.  · Mild pulmonic regurgitation.  · The estimated PA systolic pressure is 25 mmHg.  · Normal central venous pressure (3 mmHg).

## 2023-02-07 NOTE — ASSESSMENT & PLAN NOTE
Body mass index is 31.27 kg/m². Morbid obesity complicates all aspects of disease management from diagnostic modalities to treatment. Weight loss encouraged and health benefits explained to patient.

## 2023-02-07 NOTE — Clinical Note
Diagnosis: COVID-19 virus infection [[id number]]  Admitting Provider:: JULIANO PICKENS [[id number]]  Future Attending Provider: JULIANO PICKENS [[id number]]  Reason for IP Medical Treatment  (Clinical interventions that can only be accomplished in the IP setting?  ) :: COVID infection with KATINA s/p kidney transplant  Estimated Length of Stay:: 2 midnights  I certify that Inpatient services for greater than or equal to 2 midnights are medically necessary:: Yes  Plans for Post-Acute care--if anticipated (pick the  single best option):: A. No post acute care anticipated at this time

## 2023-02-07 NOTE — ED NOTES
Patient identifiers for Ibrahima Phelps 67 y.o. male checked and correct.  Chief Complaint   Patient presents with    Shortness of Breath     Covid last month, hx afib     Past Medical History:   Diagnosis Date    (HFpEF) heart failure with preserved ejection fraction 9/25/2017    Atrial fibrillation     CAD (coronary artery disease)     CHF (congestive heart failure)     Chronic diastolic heart failure 4/8/2020    CKD (chronic kidney disease) stage 3, GFR 30-59 ml/min     Dr. Latif    CKD (chronic kidney disease) stage 3, GFR 30-59 ml/min     Diverticulosis     Fever blister     Heart attack 2006    Hyperlipidemia     Hypertension     Immunodeficiency due to treatment with immunosuppressive medication     Keloid cicatrix     Left lower quadrant abdominal pain 6/5/2022    Metabolic bone disease     Pericarditis     S/P kidney transplant     Supraventricular tachycardia 06/2019    Thrombocytopenia     Thyroid disease     Tobacco use 9/5/2014    Unspecified disorder of kidney and ureter     Stage IIICKD     Allergies reported:   Review of patient's allergies indicates:   Allergen Reactions    Ace inhibitors Swelling    Verapamil Other (See Comments)     Other reaction(s): Unknown    Povidone-iodine Itching       Appearance: Pt awake, alert & oriented to person, place & time. Pt in no acute distress at present time. Pt is clean and well groomed with clothes appropriately fastened.   Skin: Skin warm, dry & intact. Color consistent with ethnicity. Mucous membranes dry. No breakdown or brusing noted.   Musculoskeletal: Patient moving all extremities well, no obvious swelling or deformities noted.   Respiratory: Respirations spontaneous and labored. Visible chest rise noted. Airway is open and patent. Some accessory muscle use noted.   Neurologic: Sensation is intact. Speech is clear and appropriate. Eyes open spontaneously, behavior appropriate to situation, follows commands, facial expression symmetrical, bilateral hand  grasp equal and even, purposeful motor response noted.  Cardiac: All peripheral pulses present. No Bilateral lower extremity edema. Cap refill is <3 seconds. Denies CP or palpitations.  Abdomen: Abdomen soft, non distended, non tender to palpation.   : Pt voids independently, denies dysuria, hematuria, frequency.

## 2023-02-07 NOTE — PROGRESS NOTES
Pharmacist Renal Dose Adjustment Note    Ibrahima Phelps is a 67 y.o. male being treated with famotidine 20 mg twice daily (home med)    Patient Data:    Vital Signs (Most Recent):  Temp: 98.3 °F (36.8 °C) (02/07/23 1102)  Pulse: 79 (02/07/23 1505)  Resp: 16 (02/07/23 1505)  BP: 139/66 (02/07/23 1505)  SpO2: 99 % (02/07/23 1505) Vital Signs (72h Range):  Temp:  [98.3 °F (36.8 °C)]   Pulse:  []   Resp:  [10-32]   BP: (105-147)/(58-68)   SpO2:  [95 %-99 %]      Recent Labs   Lab 02/07/23  1217   CREATININE 3.5*     Serum creatinine: 3.5 mg/dL (H) 02/07/23 1217  Estimated creatinine clearance: 26 mL/min (A)    Adjusted to   Famotidine 20 mg every 24 hours, per pharmacy protocol     Pharmacist's Name: Naz Wolf  Pharmacist's Extension: [phone]

## 2023-02-08 PROBLEM — E83.51 HYPOCALCEMIA: Status: ACTIVE | Noted: 2023-02-08

## 2023-02-08 PROBLEM — Z29.89 PROPHYLACTIC IMMUNOTHERAPY: Status: ACTIVE | Noted: 2023-02-08

## 2023-02-08 PROBLEM — E87.20 METABOLIC ACIDOSIS: Status: ACTIVE | Noted: 2023-02-08

## 2023-02-08 LAB
ALBUMIN SERPL BCP-MCNC: 2.5 G/DL (ref 3.5–5.2)
ALBUMIN/CREAT UR: 517.5 UG/MG (ref 0–30)
ALP SERPL-CCNC: 63 U/L (ref 55–135)
ALT SERPL W/O P-5'-P-CCNC: 20 U/L (ref 10–44)
ANION GAP SERPL CALC-SCNC: 11 MMOL/L (ref 8–16)
ANION GAP SERPL CALC-SCNC: 12 MMOL/L (ref 8–16)
ANION GAP SERPL CALC-SCNC: 15 MMOL/L (ref 8–16)
AST SERPL-CCNC: 23 U/L (ref 10–40)
BASOPHILS # BLD AUTO: 0 K/UL (ref 0–0.2)
BASOPHILS # BLD AUTO: 0 K/UL (ref 0–0.2)
BASOPHILS NFR BLD: 0 % (ref 0–1.9)
BASOPHILS NFR BLD: 0 % (ref 0–1.9)
BILIRUB SERPL-MCNC: 0.2 MG/DL (ref 0.1–1)
BUN SERPL-MCNC: 37 MG/DL (ref 8–23)
BUN SERPL-MCNC: 39 MG/DL (ref 8–23)
BUN SERPL-MCNC: 39 MG/DL (ref 8–23)
CA-I BLDV-SCNC: 0.83 MMOL/L (ref 1.06–1.42)
CALCIUM SERPL-MCNC: 6.6 MG/DL (ref 8.7–10.5)
CALCIUM SERPL-MCNC: 6.8 MG/DL (ref 8.7–10.5)
CALCIUM SERPL-MCNC: 6.8 MG/DL (ref 8.7–10.5)
CHLORIDE SERPL-SCNC: 109 MMOL/L (ref 95–110)
CHLORIDE SERPL-SCNC: 113 MMOL/L (ref 95–110)
CHLORIDE SERPL-SCNC: 113 MMOL/L (ref 95–110)
CHLORIDE UR-SCNC: 34 MMOL/L (ref 25–200)
CO2 SERPL-SCNC: 13 MMOL/L (ref 23–29)
CO2 SERPL-SCNC: 13 MMOL/L (ref 23–29)
CO2 SERPL-SCNC: 15 MMOL/L (ref 23–29)
CREAT SERPL-MCNC: 3.2 MG/DL (ref 0.5–1.4)
CREAT SERPL-MCNC: 3.2 MG/DL (ref 0.5–1.4)
CREAT SERPL-MCNC: 3.4 MG/DL (ref 0.5–1.4)
CREAT UR-MCNC: 137 MG/DL (ref 23–375)
D DIMER PPP IA.FEU-MCNC: 0.34 MG/L FEU
DIFFERENTIAL METHOD: ABNORMAL
DIFFERENTIAL METHOD: ABNORMAL
EOSINOPHIL # BLD AUTO: 0 K/UL (ref 0–0.5)
EOSINOPHIL # BLD AUTO: 0 K/UL (ref 0–0.5)
EOSINOPHIL NFR BLD: 0 % (ref 0–8)
EOSINOPHIL NFR BLD: 0 % (ref 0–8)
ERYTHROCYTE [DISTWIDTH] IN BLOOD BY AUTOMATED COUNT: 15.3 % (ref 11.5–14.5)
ERYTHROCYTE [DISTWIDTH] IN BLOOD BY AUTOMATED COUNT: 15.4 % (ref 11.5–14.5)
EST. GFR  (NO RACE VARIABLE): 18.9 ML/MIN/1.73 M^2
EST. GFR  (NO RACE VARIABLE): 20.3 ML/MIN/1.73 M^2
EST. GFR  (NO RACE VARIABLE): 20.3 ML/MIN/1.73 M^2
GLUCOSE SERPL-MCNC: 107 MG/DL (ref 70–110)
GLUCOSE SERPL-MCNC: 127 MG/DL (ref 70–110)
GLUCOSE SERPL-MCNC: 137 MG/DL (ref 70–110)
HCT VFR BLD AUTO: 26.5 % (ref 40–54)
HCT VFR BLD AUTO: 28.7 % (ref 40–54)
HGB BLD-MCNC: 8.1 G/DL (ref 14–18)
HGB BLD-MCNC: 8.6 G/DL (ref 14–18)
IMM GRANULOCYTES # BLD AUTO: 0.06 K/UL (ref 0–0.04)
IMM GRANULOCYTES # BLD AUTO: 0.07 K/UL (ref 0–0.04)
IMM GRANULOCYTES NFR BLD AUTO: 1.9 % (ref 0–0.5)
IMM GRANULOCYTES NFR BLD AUTO: 2.7 % (ref 0–0.5)
LYMPHOCYTES # BLD AUTO: 0.3 K/UL (ref 1–4.8)
LYMPHOCYTES # BLD AUTO: 0.4 K/UL (ref 1–4.8)
LYMPHOCYTES NFR BLD: 12.1 % (ref 18–48)
LYMPHOCYTES NFR BLD: 9.6 % (ref 18–48)
MCH RBC QN AUTO: 24 PG (ref 27–31)
MCH RBC QN AUTO: 24.2 PG (ref 27–31)
MCHC RBC AUTO-ENTMCNC: 30 G/DL (ref 32–36)
MCHC RBC AUTO-ENTMCNC: 30.6 G/DL (ref 32–36)
MCV RBC AUTO: 78 FL (ref 82–98)
MCV RBC AUTO: 81 FL (ref 82–98)
MICROALBUMIN UR DL<=1MG/L-MCNC: 709 UG/ML
MONOCYTES # BLD AUTO: 0.1 K/UL (ref 0.3–1)
MONOCYTES # BLD AUTO: 0.2 K/UL (ref 0.3–1)
MONOCYTES NFR BLD: 4.2 % (ref 4–15)
MONOCYTES NFR BLD: 7.7 % (ref 4–15)
NEUTROPHILS # BLD AUTO: 2.2 K/UL (ref 1.8–7.7)
NEUTROPHILS # BLD AUTO: 2.5 K/UL (ref 1.8–7.7)
NEUTROPHILS NFR BLD: 78.3 % (ref 38–73)
NEUTROPHILS NFR BLD: 83.5 % (ref 38–73)
NRBC BLD-RTO: 0 /100 WBC
NRBC BLD-RTO: 0 /100 WBC
OSMOLALITY UR: 349 MOSM/KG (ref 50–1200)
PLATELET # BLD AUTO: 141 K/UL (ref 150–450)
PLATELET # BLD AUTO: 163 K/UL (ref 150–450)
PMV BLD AUTO: 11.3 FL (ref 9.2–12.9)
PMV BLD AUTO: 12 FL (ref 9.2–12.9)
POTASSIUM SERPL-SCNC: 4.6 MMOL/L (ref 3.5–5.1)
POTASSIUM SERPL-SCNC: 5.5 MMOL/L (ref 3.5–5.1)
POTASSIUM SERPL-SCNC: 6.4 MMOL/L (ref 3.5–5.1)
PROT SERPL-MCNC: 5.7 G/DL (ref 6–8.4)
PROT UR-MCNC: 98 MG/DL (ref 0–15)
PROT/CREAT UR: 0.72 MG/G{CREAT} (ref 0–0.2)
RBC # BLD AUTO: 3.38 M/UL (ref 4.6–6.2)
RBC # BLD AUTO: 3.56 M/UL (ref 4.6–6.2)
SODIUM SERPL-SCNC: 137 MMOL/L (ref 136–145)
SODIUM SERPL-SCNC: 138 MMOL/L (ref 136–145)
SODIUM SERPL-SCNC: 139 MMOL/L (ref 136–145)
SODIUM UR-SCNC: 30 MMOL/L (ref 20–250)
TACROLIMUS BLD-MCNC: 7 NG/ML (ref 5–15)
UUN UR-MCNC: 474 MG/DL (ref 140–1050)
WBC # BLD AUTO: 2.6 K/UL (ref 3.9–12.7)
WBC # BLD AUTO: 3.13 K/UL (ref 3.9–12.7)

## 2023-02-08 PROCEDURE — 12000002 HC ACUTE/MED SURGE SEMI-PRIVATE ROOM

## 2023-02-08 PROCEDURE — 85025 COMPLETE CBC W/AUTO DIFF WBC: CPT | Mod: 91 | Performed by: STUDENT IN AN ORGANIZED HEALTH CARE EDUCATION/TRAINING PROGRAM

## 2023-02-08 PROCEDURE — 25000003 PHARM REV CODE 250: Performed by: STUDENT IN AN ORGANIZED HEALTH CARE EDUCATION/TRAINING PROGRAM

## 2023-02-08 PROCEDURE — 85025 COMPLETE CBC W/AUTO DIFF WBC: CPT

## 2023-02-08 PROCEDURE — 85379 FIBRIN DEGRADATION QUANT: CPT | Performed by: STUDENT IN AN ORGANIZED HEALTH CARE EDUCATION/TRAINING PROGRAM

## 2023-02-08 PROCEDURE — 84156 ASSAY OF PROTEIN URINE: CPT | Performed by: STUDENT IN AN ORGANIZED HEALTH CARE EDUCATION/TRAINING PROGRAM

## 2023-02-08 PROCEDURE — 99900035 HC TECH TIME PER 15 MIN (STAT)

## 2023-02-08 PROCEDURE — 93010 EKG 12-LEAD: ICD-10-PCS | Mod: ,,, | Performed by: INTERNAL MEDICINE

## 2023-02-08 PROCEDURE — 82330 ASSAY OF CALCIUM: CPT | Performed by: STUDENT IN AN ORGANIZED HEALTH CARE EDUCATION/TRAINING PROGRAM

## 2023-02-08 PROCEDURE — 63600175 PHARM REV CODE 636 W HCPCS: Performed by: STUDENT IN AN ORGANIZED HEALTH CARE EDUCATION/TRAINING PROGRAM

## 2023-02-08 PROCEDURE — 84300 ASSAY OF URINE SODIUM: CPT | Performed by: STUDENT IN AN ORGANIZED HEALTH CARE EDUCATION/TRAINING PROGRAM

## 2023-02-08 PROCEDURE — 36415 COLL VENOUS BLD VENIPUNCTURE: CPT | Performed by: STUDENT IN AN ORGANIZED HEALTH CARE EDUCATION/TRAINING PROGRAM

## 2023-02-08 PROCEDURE — 80053 COMPREHEN METABOLIC PANEL: CPT | Performed by: EMERGENCY MEDICINE

## 2023-02-08 PROCEDURE — 27000207 HC ISOLATION

## 2023-02-08 PROCEDURE — 84540 ASSAY OF URINE/UREA-N: CPT | Performed by: STUDENT IN AN ORGANIZED HEALTH CARE EDUCATION/TRAINING PROGRAM

## 2023-02-08 PROCEDURE — 94640 AIRWAY INHALATION TREATMENT: CPT

## 2023-02-08 PROCEDURE — 99223 1ST HOSP IP/OBS HIGH 75: CPT | Mod: ,,, | Performed by: INTERNAL MEDICINE

## 2023-02-08 PROCEDURE — 80048 BASIC METABOLIC PNL TOTAL CA: CPT | Mod: 91,XB | Performed by: STUDENT IN AN ORGANIZED HEALTH CARE EDUCATION/TRAINING PROGRAM

## 2023-02-08 PROCEDURE — 82570 ASSAY OF URINE CREATININE: CPT | Performed by: STUDENT IN AN ORGANIZED HEALTH CARE EDUCATION/TRAINING PROGRAM

## 2023-02-08 PROCEDURE — 99232 PR SUBSEQUENT HOSPITAL CARE,LEVL II: ICD-10-PCS | Mod: ,,, | Performed by: STUDENT IN AN ORGANIZED HEALTH CARE EDUCATION/TRAINING PROGRAM

## 2023-02-08 PROCEDURE — 94664 DEMO&/EVAL PT USE INHALER: CPT

## 2023-02-08 PROCEDURE — 63600175 PHARM REV CODE 636 W HCPCS

## 2023-02-08 PROCEDURE — 94761 N-INVAS EAR/PLS OXIMETRY MLT: CPT

## 2023-02-08 PROCEDURE — 99223 PR INITIAL HOSPITAL CARE,LEVL III: ICD-10-PCS | Mod: ,,, | Performed by: INTERNAL MEDICINE

## 2023-02-08 PROCEDURE — 25000003 PHARM REV CODE 250

## 2023-02-08 PROCEDURE — 27000646 HC AEROBIKA DEVICE

## 2023-02-08 PROCEDURE — 36415 COLL VENOUS BLD VENIPUNCTURE: CPT | Performed by: EMERGENCY MEDICINE

## 2023-02-08 PROCEDURE — 63600175 PHARM REV CODE 636 W HCPCS: Mod: TB | Performed by: STUDENT IN AN ORGANIZED HEALTH CARE EDUCATION/TRAINING PROGRAM

## 2023-02-08 PROCEDURE — 82436 ASSAY OF URINE CHLORIDE: CPT | Performed by: STUDENT IN AN ORGANIZED HEALTH CARE EDUCATION/TRAINING PROGRAM

## 2023-02-08 PROCEDURE — 80197 ASSAY OF TACROLIMUS: CPT | Performed by: STUDENT IN AN ORGANIZED HEALTH CARE EDUCATION/TRAINING PROGRAM

## 2023-02-08 PROCEDURE — 83935 ASSAY OF URINE OSMOLALITY: CPT | Performed by: STUDENT IN AN ORGANIZED HEALTH CARE EDUCATION/TRAINING PROGRAM

## 2023-02-08 PROCEDURE — 93010 ELECTROCARDIOGRAM REPORT: CPT | Mod: ,,, | Performed by: INTERNAL MEDICINE

## 2023-02-08 PROCEDURE — 93005 ELECTROCARDIOGRAM TRACING: CPT

## 2023-02-08 PROCEDURE — 25000242 PHARM REV CODE 250 ALT 637 W/ HCPCS

## 2023-02-08 PROCEDURE — 99232 SBSQ HOSP IP/OBS MODERATE 35: CPT | Mod: ,,, | Performed by: STUDENT IN AN ORGANIZED HEALTH CARE EDUCATION/TRAINING PROGRAM

## 2023-02-08 RX ORDER — MUPIROCIN 20 MG/G
OINTMENT TOPICAL 2 TIMES DAILY
Status: DISCONTINUED | OUTPATIENT
Start: 2023-02-08 | End: 2023-02-10 | Stop reason: HOSPADM

## 2023-02-08 RX ORDER — ALBUTEROL SULFATE 90 UG/1
2 AEROSOL, METERED RESPIRATORY (INHALATION)
Status: DISCONTINUED | OUTPATIENT
Start: 2023-02-09 | End: 2023-02-10 | Stop reason: HOSPADM

## 2023-02-08 RX ORDER — FUROSEMIDE 10 MG/ML
80 INJECTION INTRAMUSCULAR; INTRAVENOUS ONCE
Status: COMPLETED | OUTPATIENT
Start: 2023-02-08 | End: 2023-02-08

## 2023-02-08 RX ORDER — FUROSEMIDE 10 MG/ML
20 INJECTION INTRAMUSCULAR; INTRAVENOUS 2 TIMES DAILY
Status: DISCONTINUED | OUTPATIENT
Start: 2023-02-08 | End: 2023-02-09

## 2023-02-08 RX ORDER — CETIRIZINE HYDROCHLORIDE 5 MG/1
5 TABLET ORAL DAILY
Status: DISCONTINUED | OUTPATIENT
Start: 2023-02-08 | End: 2023-02-10 | Stop reason: HOSPADM

## 2023-02-08 RX ORDER — PARICALCITOL 1 UG/1
1 CAPSULE, LIQUID FILLED ORAL DAILY
Status: DISCONTINUED | OUTPATIENT
Start: 2023-02-09 | End: 2023-02-10 | Stop reason: HOSPADM

## 2023-02-08 RX ORDER — ALBUTEROL SULFATE 90 UG/1
2 AEROSOL, METERED RESPIRATORY (INHALATION)
Status: DISCONTINUED | OUTPATIENT
Start: 2023-02-09 | End: 2023-02-08

## 2023-02-08 RX ORDER — CALCIUM CARBONATE 200(500)MG
1000 TABLET,CHEWABLE ORAL 2 TIMES DAILY
Status: DISCONTINUED | OUTPATIENT
Start: 2023-02-08 | End: 2023-02-10 | Stop reason: HOSPADM

## 2023-02-08 RX ADMIN — REMDESIVIR 200 MG: 100 INJECTION, POWDER, LYOPHILIZED, FOR SOLUTION INTRAVENOUS at 12:02

## 2023-02-08 RX ADMIN — GABAPENTIN 300 MG: 300 CAPSULE ORAL at 08:02

## 2023-02-08 RX ADMIN — DOXAZOSIN 4 MG: 2 TABLET ORAL at 08:02

## 2023-02-08 RX ADMIN — TACROLIMUS 2 MG: 1 CAPSULE ORAL at 05:02

## 2023-02-08 RX ADMIN — FUROSEMIDE 20 MG: 10 INJECTION, SOLUTION INTRAMUSCULAR; INTRAVENOUS at 08:02

## 2023-02-08 RX ADMIN — CHLOROTHIAZIDE SODIUM 250 MG: 500 INJECTION, POWDER, LYOPHILIZED, FOR SOLUTION INTRAVENOUS at 09:02

## 2023-02-08 RX ADMIN — GABAPENTIN 300 MG: 300 CAPSULE ORAL at 09:02

## 2023-02-08 RX ADMIN — FLECAINIDE ACETATE 50 MG: 50 TABLET ORAL at 08:02

## 2023-02-08 RX ADMIN — FLECAINIDE ACETATE 50 MG: 50 TABLET ORAL at 09:02

## 2023-02-08 RX ADMIN — DEXAMETHASONE 6 MG: 4 TABLET ORAL at 09:02

## 2023-02-08 RX ADMIN — ATORVASTATIN CALCIUM 10 MG: 10 TABLET, FILM COATED ORAL at 09:02

## 2023-02-08 RX ADMIN — DOXAZOSIN 4 MG: 2 TABLET ORAL at 09:02

## 2023-02-08 RX ADMIN — FLUTICASONE FUROATE AND VILANTEROL TRIFENATATE 1 PUFF: 100; 25 POWDER RESPIRATORY (INHALATION) at 09:02

## 2023-02-08 RX ADMIN — CALCIUM CARBONATE (ANTACID) CHEW TAB 500 MG 1000 MG: 500 CHEW TAB at 08:02

## 2023-02-08 RX ADMIN — TACROLIMUS 2 MG: 1 CAPSULE ORAL at 09:02

## 2023-02-08 RX ADMIN — ALBUTEROL SULFATE 2 PUFF: 108 INHALANT RESPIRATORY (INHALATION) at 02:02

## 2023-02-08 RX ADMIN — NIFEDIPINE 60 MG: 30 TABLET, FILM COATED, EXTENDED RELEASE ORAL at 08:02

## 2023-02-08 RX ADMIN — MUPIROCIN: 20 OINTMENT TOPICAL at 08:02

## 2023-02-08 RX ADMIN — Medication 500 MG: at 09:02

## 2023-02-08 RX ADMIN — APIXABAN 5 MG: 5 TABLET, FILM COATED ORAL at 09:02

## 2023-02-08 RX ADMIN — METOPROLOL SUCCINATE 25 MG: 25 TABLET, EXTENDED RELEASE ORAL at 09:02

## 2023-02-08 RX ADMIN — APIXABAN 5 MG: 5 TABLET, FILM COATED ORAL at 08:02

## 2023-02-08 RX ADMIN — MUPIROCIN: 20 OINTMENT TOPICAL at 09:02

## 2023-02-08 RX ADMIN — PARICALCITOL 1 MCG: 1 CAPSULE, LIQUID FILLED ORAL at 09:02

## 2023-02-08 RX ADMIN — POLYETHYLENE GLYCOL 3350 17 G: 17 POWDER, FOR SOLUTION ORAL at 09:02

## 2023-02-08 RX ADMIN — CETIRIZINE HYDROCHLORIDE 5 MG: 10 TABLET, FILM COATED ORAL at 09:02

## 2023-02-08 RX ADMIN — SODIUM BICARBONATE: 84 INJECTION, SOLUTION INTRAVENOUS at 10:02

## 2023-02-08 RX ADMIN — FAMOTIDINE 20 MG: 20 TABLET ORAL at 09:02

## 2023-02-08 RX ADMIN — Medication 500 MG: at 08:02

## 2023-02-08 RX ADMIN — NIFEDIPINE 60 MG: 30 TABLET, FILM COATED, EXTENDED RELEASE ORAL at 09:02

## 2023-02-08 RX ADMIN — FLUTICASONE PROPIONATE 50 MCG: 50 SPRAY, METERED NASAL at 09:02

## 2023-02-08 RX ADMIN — THERA TABS 1 TABLET: TAB at 09:02

## 2023-02-08 RX ADMIN — FUROSEMIDE 80 MG: 10 INJECTION, SOLUTION INTRAMUSCULAR; INTRAVENOUS at 09:02

## 2023-02-08 RX ADMIN — ALBUTEROL SULFATE 2 PUFF: 108 INHALANT RESPIRATORY (INHALATION) at 12:02

## 2023-02-08 RX ADMIN — ASPIRIN 81 MG: 81 TABLET, COATED ORAL at 09:02

## 2023-02-08 RX ADMIN — ALBUTEROL SULFATE 2 PUFF: 108 INHALANT RESPIRATORY (INHALATION) at 08:02

## 2023-02-08 RX ADMIN — ALBUTEROL SULFATE 2 PUFF: 108 INHALANT RESPIRATORY (INHALATION) at 09:02

## 2023-02-08 NOTE — ASSESSMENT & PLAN NOTE
- BP stable   - continue toprol, nifedipine,   -Will continue to monitor blood pressure trend closely and adjust antihypertensive regimen as clinically indicated and tolerated.

## 2023-02-08 NOTE — NURSING
Nurses Note -- 4 Eyes      2/8/2023   1:05 AM      Skin assessed during: Admit      [x] No Pressure Injuries Present    []Prevention Measures Documented      [] Yes- Altered Skin Integrity Present or Discovered   [] LDA Added if Not in Epic (Describe Wound)   [] New Altered Skin Integrity was Present on Admit and Documented in LDA   [] Wound Image Taken    Wound Care Consulted? No    Attending Nurse:  Ayo Shoemaker LPN     Second RN/Staff Member:  Renetta Devries RN

## 2023-02-08 NOTE — ASSESSMENT & PLAN NOTE
Most likely due to historical parathyroidectomy  While here increased paricalcitol to daily and calcium carbonate 1000 BID  Resume home doses when he is discharged for Dr. Latif to manage

## 2023-02-08 NOTE — ASSESSMENT & PLAN NOTE
Endorses diarrhea since covid infection 1 month ago   - check stool studies if return; patient notes no further diarrhea in ED or hospital

## 2023-02-08 NOTE — AI DETERIORATION ALERT
Evaluation of Early Detection of Sepsis Risk     Patient Name: Ibrahima Phelps  MRN:  [id number]  [place] [facility]  Date of Admission:  2/7/2023     Principal Problem:  Acute renal failure superimposed on stage 4 chronic kidney disease   Date of Review: 02/08/2023    Patient reviewed for elevated sepsis risk score. Sepsis Screen Negative  Will continue to monitor for signs and symptoms of new or worsening infection and screen as needed. Please notify Rapid Response Team for any hypotension or decompensation.    COVID +    Sepsis Screen Results     IP Sepsis Screen (most recent)       Sepsis Screen (IP) - 02/08/23 0838       Is the patient's history or complaint suggestive of a possible infection? Yes  -SS    Are there at least two of the following signs and symptoms present? No  -SS    Sepsis signs/symptoms - WBC WBC < 4,000 or WBC > 12,000  -SS    Are any of the following organ dysfunction criteria present and not considered to be due to a chronic condition? Yes  -SS    Organ Dysfunction Criteria Creatinine > 2.0  -SS    Initiate Sepsis Protocol No  -SS              User Key  (r) = Recorded By, (t) = Taken By, (c) = Cosigned By      Initials Name    SS CORI Chávez PA-C  Sepsis

## 2023-02-08 NOTE — PLAN OF CARE
SW unable to complete assessment at this time.  Pt off floor for ultrasound.  SW will attempt to visit pt later to complete assessment.  SW attempted to telephone pt's wife, Shea Phelps, at [phone]. Number disconnected.      Meredith Marks LMSW  PRN-  [facility]  Ext. [phone]

## 2023-02-08 NOTE — ASSESSMENT & PLAN NOTE
At home on tac 2/2, /500, pred 5  Hold MMF for covid infection  Continue home tac 2/2 and pred 5  Monitor for toxicity

## 2023-02-08 NOTE — ASSESSMENT & PLAN NOTE
Hyperkalemia   Metabolic acidosis  Hypocalcemia     Cr 3.5 on admission, baseline ~2.8   · Potentially pre-renal from dehydration and volume losses in setting of COVID and diarrhea at home  · Per KTM recommendations, continue IV fluids with isotonic bicarb; IV Lasix and IV thiazide  · urine lytes and transplant renal ultrasound  · Trend metabolic panel  · Strict I&Os  · Avoid Nephrotoxic agents  · KTM consulted, appreciate assistance

## 2023-02-08 NOTE — SUBJECTIVE & OBJECTIVE
Interval History:   Patient seen and examined at bedside.    No acute events overnight.  Patient has no acute complaints this morning.  Remains with shortness of breath on exertion.  Cough is intermittent and dry.  Notes ongoing malaise.  Also endorses decreasing urine output at home.  Notes no diarrhea since being at home.  Patient updated regarding care plan.    Review of Systems   Constitutional:  Positive for appetite change, chills and fatigue. Negative for fever.   HENT:  Positive for congestion (Nasal). Negative for sneezing, sore throat and trouble swallowing.    Eyes:  Negative for visual disturbance.   Respiratory:  Positive for cough and shortness of breath (on exertion).    Cardiovascular:  Negative for chest pain, palpitations and leg swelling.   Gastrointestinal:  Negative for abdominal pain, constipation, diarrhea, nausea and vomiting.   Genitourinary:  Positive for decreased urine volume. Negative for dysuria.   Musculoskeletal:  Negative for arthralgias and myalgias.   Skin:  Negative for rash and wound.   Neurological:  Positive for dizziness, tremors, weakness and light-headedness. Negative for headaches.   Psychiatric/Behavioral:  Negative for confusion and decreased concentration.    Objective:     Vital Signs (Most Recent):  Temp: 98.2 °F (36.8 °C) (02/08/23 1132)  Pulse: 73 (02/08/23 1437)  Resp: 20 (02/08/23 1410)  BP: (!) 146/68 (02/08/23 1132)  SpO2: 96 % (02/08/23 1132)   Vital Signs (24h Range):  Temp:  [97.5 °F (36.4 °C)-98.9 °F (37.2 °C)] 98.2 °F (36.8 °C)  Pulse:  [62-85] 73  Resp:  [15-20] 20  SpO2:  [95 %-100 %] 96 %  BP: (124-148)/(60-75) 146/68     Weight: 101.9 kg (224 lb 10.4 oz)  Body mass index is 29.64 kg/m².  No intake or output data in the 24 hours ending 02/08/23 1452     Physical Exam  Vitals and nursing note reviewed.   Constitutional:       General: He is not in acute distress.     Appearance: He is well-developed. He is not toxic-appearing or diaphoretic.   HENT:       Head: Normocephalic and atraumatic.      Right Ear: External ear normal.      Left Ear: External ear normal.      Nose: No congestion or rhinorrhea.      Mouth/Throat:      Mouth: Mucous membranes are moist.      Pharynx: Oropharynx is clear. No oropharyngeal exudate.   Eyes:      General: No scleral icterus.        Right eye: No discharge.         Left eye: No discharge.   Cardiovascular:      Rate and Rhythm: Normal rate and regular rhythm.      Heart sounds: Normal heart sounds.   Pulmonary:      Effort: Pulmonary effort is normal. No respiratory distress.      Breath sounds: Normal breath sounds. No wheezing.      Comments: Lungs clear, comfortable on room air at rest   Abdominal:      General: Bowel sounds are normal. There is no distension.      Palpations: Abdomen is soft.      Tenderness: There is no abdominal tenderness.   Musculoskeletal:         General: No tenderness. Normal range of motion.      Cervical back: Normal range of motion and neck supple.      Right lower leg: No edema.      Left lower leg: No edema.   Lymphadenopathy:      Cervical: No cervical adenopathy.   Skin:     General: Skin is warm and dry.      Capillary Refill: Capillary refill takes less than 2 seconds.      Findings: No rash.   Neurological:      Mental Status: He is alert and oriented to person, place, and time.   Psychiatric:         Behavior: Behavior normal.       Significant Labs: All pertinent labs within the past 24 hours have been reviewed.    Recent Results (from the past 24 hour(s))   PT/INR    Collection Time: 02/07/23  3:16 PM   Result Value Ref Range    Prothrombin Time 12.4 9.0 - 12.5 sec    INR 1.2 0.8 - 1.2   PTT    Collection Time: 02/07/23  3:16 PM   Result Value Ref Range    aPTT 32.5 (H) 21.0 - 32.0 sec   CK    Collection Time: 02/07/23  3:16 PM   Result Value Ref Range     20 - 200 U/L   Ferritin    Collection Time: 02/07/23  3:16 PM   Result Value Ref Range    Ferritin 2,130 (H) 20.0 - 300.0 ng/mL    Troponin I    Collection Time: 02/07/23  3:16 PM   Result Value Ref Range    Troponin I 0.043 (H) 0.000 - 0.026 ng/mL   D-Dimer, Quantitative    Collection Time: 02/07/23  3:16 PM   Result Value Ref Range    D-Dimer 0.40 <0.50 mg/L FEU   Urinalysis, Reflex to Urine Culture Urine, Clean Catch    Collection Time: 02/07/23  5:00 PM    Specimen: Urine   Result Value Ref Range    Specimen UA Urine, Clean Catch     Color, UA Yellow Yellow, Straw, Illy    Appearance, UA Clear Clear    pH, UA 5.0 5.0 - 8.0    Specific Gravity, UA 1.015 1.005 - 1.030    Protein, UA 2+ (A) Negative    Glucose, UA Negative Negative    Ketones, UA Negative Negative    Bilirubin (UA) Negative Negative    Occult Blood UA Negative Negative    Nitrite, UA Negative Negative    Leukocytes, UA Trace (A) Negative   Urinalysis Microscopic    Collection Time: 02/07/23  5:00 PM   Result Value Ref Range    RBC, UA 1 0 - 4 /hpf    WBC, UA 17 (H) 0 - 5 /hpf    Bacteria Rare None-Occ /hpf    Hyaline Casts, UA 3 (A) 0-1/lpf /lpf    Microscopic Comment SEE COMMENT    Comprehensive metabolic panel    Collection Time: 02/08/23  3:31 AM   Result Value Ref Range    Sodium 138 136 - 145 mmol/L    Potassium 6.4 (HH) 3.5 - 5.1 mmol/L    Chloride 113 (H) 95 - 110 mmol/L    CO2 13 (L) 23 - 29 mmol/L    Glucose 127 (H) 70 - 110 mg/dL    BUN 37 (H) 8 - 23 mg/dL    Creatinine 3.4 (H) 0.5 - 1.4 mg/dL    Calcium 6.8 (LL) 8.7 - 10.5 mg/dL    Total Protein 5.7 (L) 6.0 - 8.4 g/dL    Albumin 2.5 (L) 3.5 - 5.2 g/dL    Total Bilirubin 0.2 0.1 - 1.0 mg/dL    Alkaline Phosphatase 63 55 - 135 U/L    AST 23 10 - 40 U/L    ALT 20 10 - 44 U/L    Anion Gap 12 8 - 16 mmol/L    eGFR 18.9 (A) >60 mL/min/1.73 m^2   CBC with Automated Differential    Collection Time: 02/08/23  3:31 AM   Result Value Ref Range    WBC 2.60 (L) 3.90 - 12.70 K/uL    RBC 3.56 (L) 4.60 - 6.20 M/uL    Hemoglobin 8.6 (L) 14.0 - 18.0 g/dL    Hematocrit 28.7 (L) 40.0 - 54.0 %    MCV 81 (L) 82 - 98 fL    MCH 24.2 (L)  27.0 - 31.0 pg    MCHC 30.0 (L) 32.0 - 36.0 g/dL    RDW 15.3 (H) 11.5 - 14.5 %    Platelets 163 150 - 450 K/uL    MPV 12.0 9.2 - 12.9 fL    Immature Granulocytes 2.7 (H) 0.0 - 0.5 %    Gran # (ANC) 2.2 1.8 - 7.7 K/uL    Immature Grans (Abs) 0.07 (H) 0.00 - 0.04 K/uL    Lymph # 0.3 (L) 1.0 - 4.8 K/uL    Mono # 0.1 (L) 0.3 - 1.0 K/uL    Eos # 0.0 0.0 - 0.5 K/uL    Baso # 0.00 0.00 - 0.20 K/uL    nRBC 0 0 /100 WBC    Gran % 83.5 (H) 38.0 - 73.0 %    Lymph % 9.6 (L) 18.0 - 48.0 %    Mono % 4.2 4.0 - 15.0 %    Eosinophil % 0.0 0.0 - 8.0 %    Basophil % 0.0 0.0 - 1.9 %    Differential Method Automated    CBC auto differential    Collection Time: 02/08/23  7:02 AM   Result Value Ref Range    WBC 3.13 (L) 3.90 - 12.70 K/uL    RBC 3.38 (L) 4.60 - 6.20 M/uL    Hemoglobin 8.1 (L) 14.0 - 18.0 g/dL    Hematocrit 26.5 (L) 40.0 - 54.0 %    MCV 78 (L) 82 - 98 fL    MCH 24.0 (L) 27.0 - 31.0 pg    MCHC 30.6 (L) 32.0 - 36.0 g/dL    RDW 15.4 (H) 11.5 - 14.5 %    Platelets 141 (L) 150 - 450 K/uL    MPV 11.3 9.2 - 12.9 fL    Immature Granulocytes 1.9 (H) 0.0 - 0.5 %    Gran # (ANC) 2.5 1.8 - 7.7 K/uL    Immature Grans (Abs) 0.06 (H) 0.00 - 0.04 K/uL    Lymph # 0.4 (L) 1.0 - 4.8 K/uL    Mono # 0.2 (L) 0.3 - 1.0 K/uL    Eos # 0.0 0.0 - 0.5 K/uL    Baso # 0.00 0.00 - 0.20 K/uL    nRBC 0 0 /100 WBC    Gran % 78.3 (H) 38.0 - 73.0 %    Lymph % 12.1 (L) 18.0 - 48.0 %    Mono % 7.7 4.0 - 15.0 %    Eosinophil % 0.0 0.0 - 8.0 %    Basophil % 0.0 0.0 - 1.9 %    Differential Method Automated    D dimer, quantitative    Collection Time: 02/08/23  7:02 AM   Result Value Ref Range    D-Dimer 0.34 <0.50 mg/L FEU   Tacrolimus Level    Collection Time: 02/08/23  7:02 AM   Result Value Ref Range    Tacrolimus Lvl 7.0 5.0 - 15.0 ng/mL   Basic metabolic panel    Collection Time: 02/08/23  8:05 AM   Result Value Ref Range    Sodium 137 136 - 145 mmol/L    Potassium 5.5 (H) 3.5 - 5.1 mmol/L    Chloride 113 (H) 95 - 110 mmol/L    CO2 13 (L) 23 - 29 mmol/L     Glucose 107 70 - 110 mg/dL    BUN 39 (H) 8 - 23 mg/dL    Creatinine 3.2 (H) 0.5 - 1.4 mg/dL    Calcium 6.8 (LL) 8.7 - 10.5 mg/dL    Anion Gap 11 8 - 16 mmol/L    eGFR 20.3 (A) >60 mL/min/1.73 m^2   Microalbumin/Creatinine Ratio, Urine    Collection Time: 02/08/23 10:52 AM   Result Value Ref Range    Microalbumin, Urine 709.0 ug/mL    Creatinine, Urine 137.0 23.0 - 375.0 mg/dL    Microalb/Creat Ratio 517.5 (H) 0.0 - 30.0 ug/mg   Osmolality, Urine    Collection Time: 02/08/23 10:52 AM   Result Value Ref Range    Osmolality, Urine 349 50 - 1200 mOsm/kg   Chloride, Random Urine    Collection Time: 02/08/23 10:52 AM   Result Value Ref Range    Chloride, Urine 34 25 - 200 mmol/L   Urea Nitrogen, Random Urine    Collection Time: 02/08/23 10:52 AM   Result Value Ref Range    Urine Urea Nitrogen 474 140 - 1050 mg/dL   Sodium, Random Urine    Collection Time: 02/08/23 10:52 AM   Result Value Ref Range    Sodium, Urine 30 20 - 250 mmol/L   Creatinine, Random Urine    Collection Time: 02/08/23 10:52 AM   Result Value Ref Range    Creatinine, Urine 137.0 23.0 - 375.0 mg/dL   Protein/Creatinine Ratio, Urine    Collection Time: 02/08/23 10:52 AM   Result Value Ref Range    Protein, Urine Random 98 (H) 0 - 15 mg/dL    Creatinine, Urine 137.0 23.0 - 375.0 mg/dL    Prot/Creat Ratio, Urine 0.72 (H) 0.00 - 0.20         Significant Imaging: I have reviewed all pertinent imaging results/findings within the past 24 hours.    Imaging Results              X-Ray Chest PA And Lateral (Final result)  Result time 02/07/23 12:55:06      Final result by Bryon Leroy MD (02/07/23 12:55:06)                   Impression:      No significant intrathoracic abnormality.  Allowing for a poorer inspiratory depth level on the current examination, there has been no significant detrimental interval change in the appearance of the chest since the examinations referenced above.      Electronically signed by: Bryon Leroy MD  Date:    02/07/2023  Time:    12:55                Narrative:    EXAMINATION:  XR CHEST PA AND LATERAL    CLINICAL HISTORY:  Chest Pain;    TECHNIQUE:  Two views of the chest were obtained, with PA and lateral projections submitted.    COMPARISON:  Comparison is made to 01/12/2023 and 06/05/2022.  Clinical information of shortness of breath.    FINDINGS:  Heart size is no larger than upper limit of normal, and the appearance of the cardiomediastinal silhouette has not changed appreciably since the examinations referenced above.  Pulmonary vascularity is within normal limits, and there are no findings indicating current cardiac decompensation.  Lung zones appear clear, and are free of significant airspace consolidation or volume loss.  No pleural fluid of any substantial volume is seen on either side.  No abnormal mediastinal widening.  No pneumothorax.  Minor deformity relating to the lateral aspect of the left 7th rib has the appearance of an old healed fracture.

## 2023-02-08 NOTE — ASSESSMENT & PLAN NOTE
Most likely due to volume depletion from diarrhea and decreased oral intake  BL sCr 2.2-2.7  Admit sCr 3.5, with hyperkalemia and hypocalcemia and metabolic acidosis  UA with no nitrites and trace leuk est, 17 WBC  UPCR 0.7 and at baseline  KUS with no hydro, adequate velocity, no collections  Please give IV fluids with isotonic bicarb; added furo and thiazide for kaliuresis

## 2023-02-08 NOTE — ASSESSMENT & PLAN NOTE
Per KTM, likely due to historical parathyroidectomy  Increase paricalcitol and calcium carbonate as per KTM  Resume home doses when he is discharged for Dr. Latif to manage

## 2023-02-08 NOTE — ASSESSMENT & PLAN NOTE
Immunosuppression therapy     - KTM consulted, appreciate assistance   - continue tacrolimus  - hold prednisone while on dexamethasone. Hold cellcept.   - check tacro level daily

## 2023-02-08 NOTE — HPI
67 year old male with KT 2005 who presented to the ED from home after not having improvement of SOB, diarrhea from diagnosis of covid 1 month ago. Reports visiting our ED and testing positive for covid, but being stable enough to go home. No interventions were provided at that time. He remained on all immunosuppressants. After going home he did not improve and did home testing kits were negative. He had periodic every other day large volume diarrhea, poor appetite, fatigue, SBOE. Fevers after initial diagnosis resolved relatively quickly. Also reports worsening tremor.   Denies syncope, chest pain, abdominal pain, nausea, vomiting, changes in urination, hematuria, hematochezia, melena

## 2023-02-08 NOTE — PROGRESS NOTES
[facility] - [facility] ([address] Medicine  Progress Note    Patient Name: Ibrahima Phelps  MRN: [id number]  Patient Class: IP- Inpatient   Admission Date: 2/7/2023  Length of Stay: 1 days  Attending Physician: Viola Portillo MD  Primary Care Provider: Connie Magdaleno MD        Subjective:     Principal Problem:Acute renal failure superimposed on stage 4 chronic kidney disease        HPI:  Ibrahima Phelps is a 66 yo male with HTN, HLD, CAD, CHF, CKD4 s/o kidney transplant on chronic immunosuppressive therapy, who presents to the emergency department with persistent DAUGHERTY since having COVID 1 month ago. He came to the ED at that time, but was discharge home. Since then, he becomes severely fatigued and SOB after walking minimal distances. He endorses associated coughing, intermittent headaches, intermittent hoarseness.  Endorses daily diarrhea for the past month, and decreased appetite. He has also had some intermittent fevers, although none recently.  He denies any associated chest pain or palpitations, wheezing, congestion, abdominal pain, N/V, lightheadedness, weakness, falls, urinary symptoms. The wife does report that nebulizer treatments have been successful in improving his symptoms.     In the ED: , otherwise VSS. Maintaining sats on room air. CBC with stable chronic anemia. CMP with bicarb 15 (anion gap wnl), BUN 37/ Cr 3.5 (baseline 2.8). , trop 0.062 - both baseline. EKG without ischemia. Lactate 1.3. Positive for covid. CXR without acute process. Given duo-nebs x1, 30cc/kg NS resuscitation and IV zosyn.       Overview/Hospital Course:  Transplant nephrology consulted on admission.  KATINA with little improvement following IV fluid resuscitation.  Patient developed hyperkalemia 2/8.  Patient initiated on IV fluids with isotonic bicarb in addition to IV Lasix and IV thiazide for kaliuresis.  Given patient high risk for COVID progression, initiated on remdesivir.      Interval History:    Patient seen and examined at bedside.    No acute events overnight.  Patient has no acute complaints this morning.  Remains with shortness of breath on exertion.  Cough is intermittent and dry.  Notes ongoing malaise.  Also endorses decreasing urine output at home.  Notes no diarrhea since being at home.  Patient updated regarding care plan.    Review of Systems   Constitutional:  Positive for appetite change, chills and fatigue. Negative for fever.   HENT:  Positive for congestion (Nasal). Negative for sneezing, sore throat and trouble swallowing.    Eyes:  Negative for visual disturbance.   Respiratory:  Positive for cough and shortness of breath (on exertion).    Cardiovascular:  Negative for chest pain, palpitations and leg swelling.   Gastrointestinal:  Negative for abdominal pain, constipation, diarrhea, nausea and vomiting.   Genitourinary:  Positive for decreased urine volume. Negative for dysuria.   Musculoskeletal:  Negative for arthralgias and myalgias.   Skin:  Negative for rash and wound.   Neurological:  Positive for dizziness, tremors, weakness and light-headedness. Negative for headaches.   Psychiatric/Behavioral:  Negative for confusion and decreased concentration.    Objective:     Vital Signs (Most Recent):  Temp: 98.2 °F (36.8 °C) (02/08/23 1132)  Pulse: 73 (02/08/23 1437)  Resp: 20 (02/08/23 1410)  BP: (!) 146/68 (02/08/23 1132)  SpO2: 96 % (02/08/23 1132)   Vital Signs (24h Range):  Temp:  [97.5 °F (36.4 °C)-98.9 °F (37.2 °C)] 98.2 °F (36.8 °C)  Pulse:  [62-85] 73  Resp:  [15-20] 20  SpO2:  [95 %-100 %] 96 %  BP: (124-148)/(60-75) 146/68     Weight: 101.9 kg (224 lb 10.4 oz)  Body mass index is 29.64 kg/m².  No intake or output data in the 24 hours ending 02/08/23 1452     Physical Exam  Vitals and nursing note reviewed.   Constitutional:       General: He is not in acute distress.     Appearance: He is well-developed. He is not toxic-appearing or diaphoretic.   HENT:      Head: Normocephalic  and atraumatic.      Right Ear: External ear normal.      Left Ear: External ear normal.      Nose: No congestion or rhinorrhea.      Mouth/Throat:      Mouth: Mucous membranes are moist.      Pharynx: Oropharynx is clear. No oropharyngeal exudate.   Eyes:      General: No scleral icterus.        Right eye: No discharge.         Left eye: No discharge.   Cardiovascular:      Rate and Rhythm: Normal rate and regular rhythm.      Heart sounds: Normal heart sounds.   Pulmonary:      Effort: Pulmonary effort is normal. No respiratory distress.      Breath sounds: Normal breath sounds. No wheezing.      Comments: Lungs clear, comfortable on room air at rest   Abdominal:      General: Bowel sounds are normal. There is no distension.      Palpations: Abdomen is soft.      Tenderness: There is no abdominal tenderness.   Musculoskeletal:         General: No tenderness. Normal range of motion.      Cervical back: Normal range of motion and neck supple.      Right lower leg: No edema.      Left lower leg: No edema.   Lymphadenopathy:      Cervical: No cervical adenopathy.   Skin:     General: Skin is warm and dry.      Capillary Refill: Capillary refill takes less than 2 seconds.      Findings: No rash.   Neurological:      Mental Status: He is alert and oriented to person, place, and time.   Psychiatric:         Behavior: Behavior normal.       Significant Labs: All pertinent labs within the past 24 hours have been reviewed.    Recent Results (from the past 24 hour(s))   PT/INR    Collection Time: 02/07/23  3:16 PM   Result Value Ref Range    Prothrombin Time 12.4 9.0 - 12.5 sec    INR 1.2 0.8 - 1.2   PTT    Collection Time: 02/07/23  3:16 PM   Result Value Ref Range    aPTT 32.5 (H) 21.0 - 32.0 sec   CK    Collection Time: 02/07/23  3:16 PM   Result Value Ref Range     20 - 200 U/L   Ferritin    Collection Time: 02/07/23  3:16 PM   Result Value Ref Range    Ferritin 2,130 (H) 20.0 - 300.0 ng/mL   Troponin I     Collection Time: 02/07/23  3:16 PM   Result Value Ref Range    Troponin I 0.043 (H) 0.000 - 0.026 ng/mL   D-Dimer, Quantitative    Collection Time: 02/07/23  3:16 PM   Result Value Ref Range    D-Dimer 0.40 <0.50 mg/L FEU   Urinalysis, Reflex to Urine Culture Urine, Clean Catch    Collection Time: 02/07/23  5:00 PM    Specimen: Urine   Result Value Ref Range    Specimen UA Urine, Clean Catch     Color, UA Yellow Yellow, Straw, Lily    Appearance, UA Clear Clear    pH, UA 5.0 5.0 - 8.0    Specific Gravity, UA 1.015 1.005 - 1.030    Protein, UA 2+ (A) Negative    Glucose, UA Negative Negative    Ketones, UA Negative Negative    Bilirubin (UA) Negative Negative    Occult Blood UA Negative Negative    Nitrite, UA Negative Negative    Leukocytes, UA Trace (A) Negative   Urinalysis Microscopic    Collection Time: 02/07/23  5:00 PM   Result Value Ref Range    RBC, UA 1 0 - 4 /hpf    WBC, UA 17 (H) 0 - 5 /hpf    Bacteria Rare None-Occ /hpf    Hyaline Casts, UA 3 (A) 0-1/lpf /lpf    Microscopic Comment SEE COMMENT    Comprehensive metabolic panel    Collection Time: 02/08/23  3:31 AM   Result Value Ref Range    Sodium 138 136 - 145 mmol/L    Potassium 6.4 (HH) 3.5 - 5.1 mmol/L    Chloride 113 (H) 95 - 110 mmol/L    CO2 13 (L) 23 - 29 mmol/L    Glucose 127 (H) 70 - 110 mg/dL    BUN 37 (H) 8 - 23 mg/dL    Creatinine 3.4 (H) 0.5 - 1.4 mg/dL    Calcium 6.8 (LL) 8.7 - 10.5 mg/dL    Total Protein 5.7 (L) 6.0 - 8.4 g/dL    Albumin 2.5 (L) 3.5 - 5.2 g/dL    Total Bilirubin 0.2 0.1 - 1.0 mg/dL    Alkaline Phosphatase 63 55 - 135 U/L    AST 23 10 - 40 U/L    ALT 20 10 - 44 U/L    Anion Gap 12 8 - 16 mmol/L    eGFR 18.9 (A) >60 mL/min/1.73 m^2   CBC with Automated Differential    Collection Time: 02/08/23  3:31 AM   Result Value Ref Range    WBC 2.60 (L) 3.90 - 12.70 K/uL    RBC 3.56 (L) 4.60 - 6.20 M/uL    Hemoglobin 8.6 (L) 14.0 - 18.0 g/dL    Hematocrit 28.7 (L) 40.0 - 54.0 %    MCV 81 (L) 82 - 98 fL    MCH 24.2 (L) 27.0 - 31.0 pg     MCHC 30.0 (L) 32.0 - 36.0 g/dL    RDW 15.3 (H) 11.5 - 14.5 %    Platelets 163 150 - 450 K/uL    MPV 12.0 9.2 - 12.9 fL    Immature Granulocytes 2.7 (H) 0.0 - 0.5 %    Gran # (ANC) 2.2 1.8 - 7.7 K/uL    Immature Grans (Abs) 0.07 (H) 0.00 - 0.04 K/uL    Lymph # 0.3 (L) 1.0 - 4.8 K/uL    Mono # 0.1 (L) 0.3 - 1.0 K/uL    Eos # 0.0 0.0 - 0.5 K/uL    Baso # 0.00 0.00 - 0.20 K/uL    nRBC 0 0 /100 WBC    Gran % 83.5 (H) 38.0 - 73.0 %    Lymph % 9.6 (L) 18.0 - 48.0 %    Mono % 4.2 4.0 - 15.0 %    Eosinophil % 0.0 0.0 - 8.0 %    Basophil % 0.0 0.0 - 1.9 %    Differential Method Automated    CBC auto differential    Collection Time: 02/08/23  7:02 AM   Result Value Ref Range    WBC 3.13 (L) 3.90 - 12.70 K/uL    RBC 3.38 (L) 4.60 - 6.20 M/uL    Hemoglobin 8.1 (L) 14.0 - 18.0 g/dL    Hematocrit 26.5 (L) 40.0 - 54.0 %    MCV 78 (L) 82 - 98 fL    MCH 24.0 (L) 27.0 - 31.0 pg    MCHC 30.6 (L) 32.0 - 36.0 g/dL    RDW 15.4 (H) 11.5 - 14.5 %    Platelets 141 (L) 150 - 450 K/uL    MPV 11.3 9.2 - 12.9 fL    Immature Granulocytes 1.9 (H) 0.0 - 0.5 %    Gran # (ANC) 2.5 1.8 - 7.7 K/uL    Immature Grans (Abs) 0.06 (H) 0.00 - 0.04 K/uL    Lymph # 0.4 (L) 1.0 - 4.8 K/uL    Mono # 0.2 (L) 0.3 - 1.0 K/uL    Eos # 0.0 0.0 - 0.5 K/uL    Baso # 0.00 0.00 - 0.20 K/uL    nRBC 0 0 /100 WBC    Gran % 78.3 (H) 38.0 - 73.0 %    Lymph % 12.1 (L) 18.0 - 48.0 %    Mono % 7.7 4.0 - 15.0 %    Eosinophil % 0.0 0.0 - 8.0 %    Basophil % 0.0 0.0 - 1.9 %    Differential Method Automated    D dimer, quantitative    Collection Time: 02/08/23  7:02 AM   Result Value Ref Range    D-Dimer 0.34 <0.50 mg/L FEU   Tacrolimus Level    Collection Time: 02/08/23  7:02 AM   Result Value Ref Range    Tacrolimus Lvl 7.0 5.0 - 15.0 ng/mL   Basic metabolic panel    Collection Time: 02/08/23  8:05 AM   Result Value Ref Range    Sodium 137 136 - 145 mmol/L    Potassium 5.5 (H) 3.5 - 5.1 mmol/L    Chloride 113 (H) 95 - 110 mmol/L    CO2 13 (L) 23 - 29 mmol/L    Glucose 107 70 -  110 mg/dL    BUN 39 (H) 8 - 23 mg/dL    Creatinine 3.2 (H) 0.5 - 1.4 mg/dL    Calcium 6.8 (LL) 8.7 - 10.5 mg/dL    Anion Gap 11 8 - 16 mmol/L    eGFR 20.3 (A) >60 mL/min/1.73 m^2   Microalbumin/Creatinine Ratio, Urine    Collection Time: 02/08/23 10:52 AM   Result Value Ref Range    Microalbumin, Urine 709.0 ug/mL    Creatinine, Urine 137.0 23.0 - 375.0 mg/dL    Microalb/Creat Ratio 517.5 (H) 0.0 - 30.0 ug/mg   Osmolality, Urine    Collection Time: 02/08/23 10:52 AM   Result Value Ref Range    Osmolality, Urine 349 50 - 1200 mOsm/kg   Chloride, Random Urine    Collection Time: 02/08/23 10:52 AM   Result Value Ref Range    Chloride, Urine 34 25 - 200 mmol/L   Urea Nitrogen, Random Urine    Collection Time: 02/08/23 10:52 AM   Result Value Ref Range    Urine Urea Nitrogen 474 140 - 1050 mg/dL   Sodium, Random Urine    Collection Time: 02/08/23 10:52 AM   Result Value Ref Range    Sodium, Urine 30 20 - 250 mmol/L   Creatinine, Random Urine    Collection Time: 02/08/23 10:52 AM   Result Value Ref Range    Creatinine, Urine 137.0 23.0 - 375.0 mg/dL   Protein/Creatinine Ratio, Urine    Collection Time: 02/08/23 10:52 AM   Result Value Ref Range    Protein, Urine Random 98 (H) 0 - 15 mg/dL    Creatinine, Urine 137.0 23.0 - 375.0 mg/dL    Prot/Creat Ratio, Urine 0.72 (H) 0.00 - 0.20         Significant Imaging: I have reviewed all pertinent imaging results/findings within the past 24 hours.    Imaging Results              X-Ray Chest PA And Lateral (Final result)  Result time 02/07/23 12:55:06      Final result by Bryon Leroy MD (02/07/23 12:55:06)                   Impression:      No significant intrathoracic abnormality.  Allowing for a poorer inspiratory depth level on the current examination, there has been no significant detrimental interval change in the appearance of the chest since the examinations referenced above.      Electronically signed by: Bryon Leroy MD  Date:    02/07/2023  Time:    12:55                Narrative:    EXAMINATION:  XR CHEST PA AND LATERAL    CLINICAL HISTORY:  Chest Pain;    TECHNIQUE:  Two views of the chest were obtained, with PA and lateral projections submitted.    COMPARISON:  Comparison is made to 01/12/2023 and 06/05/2022.  Clinical information of shortness of breath.    FINDINGS:  Heart size is no larger than upper limit of normal, and the appearance of the cardiomediastinal silhouette has not changed appreciably since the examinations referenced above.  Pulmonary vascularity is within normal limits, and there are no findings indicating current cardiac decompensation.  Lung zones appear clear, and are free of significant airspace consolidation or volume loss.  No pleural fluid of any substantial volume is seen on either side.  No abnormal mediastinal widening.  No pneumothorax.  Minor deformity relating to the lateral aspect of the left 7th rib has the appearance of an old healed fracture.                                          Assessment/Plan:      * Acute renal failure superimposed on stage 4 chronic kidney disease  Hyperkalemia   Metabolic acidosis  Hypocalcemia     Cr 3.5 on admission, baseline ~2.8   · Potentially pre-renal from dehydration and volume losses in setting of COVID and diarrhea at home  · Per KTM recommendations, continue IV fluids with isotonic bicarb; IV Lasix and IV thiazide  · urine lytes and transplant renal ultrasound  · Trend metabolic panel  · Strict I&Os  · Avoid Nephrotoxic agents  · KTM consulted, appreciate assistance       COVID-19  Patient is identified as High risk for severe complications of COVID 19 based on COVID risk score of 9   Initiate standard COVID protocols; COVID-19 testing ,Infection Control notification  and isolation- respiratory, contact and droplet per protocol    Diagnostics: (leukopenia, hyponatremia, hyperferritinemia, elevated troponin, elevated d-dimer, age, and comorbidities are significant predictors of poor clinical outcome)  CBC,  CMP, Ferritin, CRP, Portable CXR, UA and culture, PTT and INR   - CXR reviewed - clear     Management: Initiate targeted therapy with Remdesivir, 200mg IV x1, followed by 100mg IV daily x5 days total and Dexamethasone PO/IV 6mg daily x10 days, Inhaled bronchodilators as needed for shortness of breath. and Continuous cardiac monitoring.   - Patient tested positive for covid 1/12/23, and tested positive again today 2/7/23.   -on room air    Kidney replaced by transplant  Immunosuppression therapy     - KTM consulted, appreciate assistance   - continue tacrolimus  - hold prednisone while on dexamethasone. Hold cellcept.   - check tacro level daily      Diarrhea  Endorses diarrhea since covid infection 1 month ago   - check stool studies if return; patient notes no further diarrhea in ED or hospital    Hypocalcemia  Per KTM, likely due to historical parathyroidectomy  Increase paricalcitol and calcium carbonate as per KTM  Resume home doses when he is discharged for Dr. Latif to manage    Anemia due to stage 4 chronic kidney disease  Patient's anemia is currently controlled. Baseline.   Current CBC reviewed-   Lab Results   Component Value Date    HGB 8.1 (L) 02/08/2023    HCT 26.5 (L) 02/08/2023     Monitor serial CBC and transfuse if patient becomes hemodynamically unstable, symptomatic or H/H drops below 7/21.   No signs to suggest acute GI bleed.        Chronic diastolic heart failure  -  (baseline). No evidence of volume overload on exam. CXR clear.   - last echo reviewed    Results for orders placed during the hospital encounter of 03/21/22    Echo    Interpretation Summary  · Moderate left atrial enlargement.  · The left ventricle is normal in size with concentric remodeling and normal systolic function.  · The estimated ejection fraction is 55%.  · There are segmental left ventricular wall motion abnormalities : akinetic basal inferior wall.  · Indeterminate left ventricular diastolic function.  · Mild  right atrial enlargement.  · Normal right ventricular size with normal right ventricular systolic function.  · There is mild aortic valve stenosis.  · Aortic valve area is 1.84 cm2; peak velocity is 2.60 m/s; mean gradient is 13 mmHg.  · Mild aortic regurgitation.  · Mild pulmonic regurgitation.  · The estimated PA systolic pressure is 25 mmHg.  · Normal central venous pressure (3 mmHg).        Paroxysmal atrial fibrillation  Long term AC     Patient with Paroxysmal (<7 days) atrial fibrillation which is controlled currently with Beta Blocker and flecainide. Patient is currently in sinus rhythm.[id number] Score: 3.  Anticoagulation indicated. Anticoagulation done with eliquis; will continue  - tele     Obesity (BMI 30-39.9)  Body mass index is 31.27 kg/m². Morbid obesity complicates all aspects of disease management from diagnostic modalities to treatment. Weight loss encouraged and health benefits explained to patient.         Mixed hyperlipidemia  - continue statin    CAD (coronary artery disease)  - continue daily asa, statin  - trop 0.062 (baseline); no c/o chest pain   - EKG without concern for ischemia       Hypertension  - BP stable   - continue toprol, nifedipine,   -Will continue to monitor blood pressure trend closely and adjust antihypertensive regimen as clinically indicated and tolerated.          VTE Risk Mitigation (From admission, onward)         Ordered     apixaban tablet 5 mg  2 times daily         02/07/23 1505     IP VTE HIGH RISK PATIENT  Once         02/07/23 1505     Place sequential compression device  Until discontinued         02/07/23 1505     Place RASHAD hose  Until discontinued         02/07/23 1505                Discharge Planning   RAMU: 2/10/2023     Code Status: Full Code   Is the patient medically ready for discharge?: No    Reason for patient still in hospital (select all that apply): Patient trending condition, Laboratory test, Treatment, Imaging, Consult recommendations and  Pending disposition                     Viola Portillo MD  Department of Hospital Medicine   [facility] - Intensive Care ([place]-[facility])

## 2023-02-08 NOTE — ASSESSMENT & PLAN NOTE
Patient is identified as High risk for severe complications of COVID 19 based on COVID risk score of 9   Initiate standard COVID protocols; COVID-19 testing ,Infection Control notification  and isolation- respiratory, contact and droplet per protocol    Diagnostics: (leukopenia, hyponatremia, hyperferritinemia, elevated troponin, elevated d-dimer, age, and comorbidities are significant predictors of poor clinical outcome)  CBC, CMP, Ferritin, CRP, Portable CXR, UA and culture, PTT and INR   - CXR reviewed - clear     Management: Initiate targeted therapy with Remdesivir, 200mg IV x1, followed by 100mg IV daily x5 days total and Dexamethasone PO/IV 6mg daily x10 days, Inhaled bronchodilators as needed for shortness of breath. and Continuous cardiac monitoring.   - Patient tested positive for covid 1/12/23, and tested positive again today 2/7/23.   -on room air

## 2023-02-08 NOTE — CONSULTS
[facility] - [facility] ([place] [address])  Kidney Transplant  Consult Note    Inpatient consult to Kidney/Pancreas Transplant Medicine  Consult performed by: Jeff Diaz Jr., MD  Consult ordered by: Catherine Sarmiento PA-C  Reason for consult: ktm  Assessment/Recommendations: See note for details            Subjective:     History of Present Illness:   67 year old male with KT 2005 who presented to the ED from home after not having improvement of SOB, diarrhea from diagnosis of covid 1 month ago. Reports visiting our ED and testing positive for covid, but being stable enough to go home. No interventions were provided at that time. He remained on all immunosuppressants. After going home he did not improve and did home testing kits were negative. He had periodic every other day large volume diarrhea, poor appetite, fatigue, SBOE. Fevers after initial diagnosis resolved relatively quickly. Also reports worsening tremor.   Denies syncope, chest pain, abdominal pain, nausea, vomiting, changes in urination, hematuria, hematochezia, melena        Past Medical and Surgical History: Mr. Phelps has a past medical history of (HFpEF) heart failure with preserved ejection fraction (9/25/2017), Atrial fibrillation, CAD (coronary artery disease), CHF (congestive heart failure), Chronic diastolic heart failure (4/8/2020), CKD (chronic kidney disease) stage 3, GFR 30-59 ml/min, CKD (chronic kidney disease) stage 3, GFR 30-59 ml/min, Diverticulosis, Fever blister, Heart attack (2006), Hyperlipidemia, Hypertension, Immunodeficiency due to treatment with immunosuppressive medication, Keloid cicatrix, Left lower quadrant abdominal pain (6/5/2022), Metabolic bone disease, Pericarditis, S/P kidney transplant, Supraventricular tachycardia (06/2019), Thrombocytopenia, Thyroid disease, Tobacco use (9/5/2014), and Unspecified disorder of kidney and ureter.  He has a past surgical history that includes Cholecystectomy; Cardioversion  "(04/30/15); Kidney transplant (2005); Parathyroidectomy; Colonoscopy (April 20, 2011); Colonoscopy (N/A, 3/13/2020); Colonoscopy (N/A, 2/4/2021); Colonoscopy (N/A, 3/3/2021); Coronary angioplasty with stent (9/13/2006); and Treatment of cardiac arrhythmia (N/A, 3/28/2022).    Past Social and Family History: Mr. Phelps reports that he quit smoking about a year ago. His smoking use included cigarettes. He has a 20.00 pack-year smoking history. He has never used smokeless tobacco. He reports that he does not drink alcohol and does not use drugs.His family history includes Heart disease in his father; Kidney disease in his brother and sister; Kidney failure in his brother and sister; No Known Problems in his brother, sister, sister, and sister; Thyroid disease in his mother.    Intake/Output - Last 3 Shifts         02/06 0700  02/07 0659 02/07 0700  02/08 0659 02/08 0700  02/09 0659           Urine Occurrence   2 x             Review of Systems   Constitutional:  Positive for fatigue.   HENT: Negative.     Eyes: Negative.    Respiratory:  Positive for cough and shortness of breath.    Cardiovascular: Negative.    Gastrointestinal:  Positive for diarrhea.   Endocrine: Negative.    Genitourinary: Negative.    Musculoskeletal: Negative.    Skin: Negative.    Neurological:  Positive for tremors and weakness.   Psychiatric/Behavioral: Negative.     Objective:     Vital Signs (Most Recent):  Temp: 98.2 °F (36.8 °C) (02/08/23 1132)  Pulse: 72 (02/08/23 1132)  Resp: 15 (02/08/23 1132)  BP: (!) 146/68 (02/08/23 1132)  SpO2: 96 % (02/08/23 1132)   Vital Signs (24h Range):  Temp:  [97.5 °F (36.4 °C)-98.9 °F (37.2 °C)] 98.2 °F (36.8 °C)  Pulse:  [62-85] 72  Resp:  [15-20] 15  SpO2:  [95 %-100 %] 96 %  BP: (124-148)/(60-75) 146/68     Weight: 101.9 kg (224 lb 10.4 oz)  Height: 6' 1" (185.4 cm)  Body mass index is 29.64 kg/m².    Physical Exam  Vitals and nursing note reviewed.   Constitutional:       General: He is not in acute " distress.     Appearance: He is ill-appearing.   Eyes:      General: No scleral icterus.  Cardiovascular:      Rate and Rhythm: Normal rate.   Pulmonary:      Effort: Pulmonary effort is normal. No respiratory distress.   Abdominal:      General: There is no distension.      Palpations: Abdomen is soft.   Musculoskeletal:      Right lower leg: No edema.      Left lower leg: No edema.   Skin:     General: Skin is dry.      Coloration: Skin is not jaundiced.   Neurological:      Mental Status: He is alert.       Significant Labs:  Labs within the past 24 hours have been reviewed.    Diagnostics:  Chest X-Ray: No results found. However, due to the size of the patient record, not all encounters were searched. Please check Results Review for a complete set of results.  US - Kidney: Results for orders placed during the hospital encounter of 06/04/22    US Transplant Kidney With Doppler    Narrative  EXAMINATION:  US TRANSPLANT KIDNEY WITH DOPPLER    CLINICAL HISTORY:  LUQ/LLQ severe pain, hx transplant;    TECHNIQUE:  Real time gray scale and doppler ultrasound was performed over the patient's renal allograft.    COMPARISON:  CT renal stone study 06/04/2022    Renal transplant ultrasound 05/20/2022    FINDINGS:  Renal allograft in the left lower quadrant.  The allograft measures 11.5 cm. Normal perfusion. No hydronephrosis.    No fluid collections.    Vasculature:    Resistive indices are as follow:    Interlobar artery: 1.0 (previously 0.8)    Upper segmental: 0.83 (previously 0.81)    Mid segmental: 0.83 (previously 0.88)    Lower segmental: 0.84 (previously 0.83)    Main renal artery peak systolic velocity: 173cm/sec with normal waveform, (previously 200cm/sec).    Renal artery/iliac ratio: 1.0.    The main renal vein is patent.    Impression  Elevated arterial resistive indices may be seen in setting of transplant rejection, drug toxicity or medical renal disease.    Electronically signed by resident: Afsaneh  "Cyndi  Date:    06/04/2022  Time:    23:59    Electronically signed by: Chilo Ward MD  Date:    06/05/2022  Time:    00:40    Assessment/Plan:     * Acute renal failure superimposed on stage 4 chronic kidney disease  Most likely due to volume depletion from diarrhea and decreased oral intake  BL sCr 2.2-2.7  Admit sCr 3.5, with hyperkalemia and hypocalcemia and metabolic acidosis  UA with no nitrites and trace leuk est, 17 WBC  UPCR 0.7 and at baseline  KUS with no hydro, adequate velocity, no collections  Please give IV fluids with isotonic bicarb; added furo and thiazide for kaliuresis     Prophylactic immunotherapy  Continue immunosuppression as per problem "Long-term use of immunosuppressant medication"        Hypocalcemia  Most likely due to historical parathyroidectomy  While here increased paricalcitol to daily and calcium carbonate 1000 BID  Resume home doses when he is discharged for Dr. 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 Latif  BL sCr 2.2-2.7  Continue immunosuppression as per problem "Long-term use of immunosuppressant medication"      Diarrhea  Agree with infectious stool studies      Long-term use of immunosuppressant medication  At home on tac 2/2, /500, pred 5  Hold MMF for covid infection  Continue home tac 2/2 and pred 5  Monitor for toxicity              Jeff Diaz Jr., MD  Kidney Transplant  [facility] - Intensive Care ([place] [address])  "

## 2023-02-08 NOTE — ASSESSMENT & PLAN NOTE
Patient's anemia is currently controlled. Baseline.   Current CBC reviewed-   Lab Results   Component Value Date    HGB 8.1 (L) 02/08/2023    HCT 26.5 (L) 02/08/2023     Monitor serial CBC and transfuse if patient becomes hemodynamically unstable, symptomatic or H/H drops below 7/21.   No signs to suggest acute GI bleed.

## 2023-02-08 NOTE — HOSPITAL COURSE
Transplant nephrology consulted on admission.  KATINA with slow improvement following IV fluid resuscitation, ultimately back to baseline which is 2.2-2.7 per nephrology.  Patient developed hyperkalemia on 2/8.  Patient initiated on IV fluids with isotonic bicarb in addition to IV Lasix and IV thiazide for kaliuresis. Potassium normalized. Given continued dyspnea on exertion and patient high risk for COVID progression, initiated on remdesivir. Pt remained on room air so decadron discontinued and home prednisone started. Echo revealed preserved EF, grade II diastolic dysfunction, LAE, mild AS, and pulmonary HTN. Since previous echo, pulmonary HTN is a new finding and LAE/diastolic dysfunction seems to have progressed. These new findings may be related to recent COVID infection. Pt also has symptoms consistent with TONYA and should undergo outpatient sleep study. Pt complained of some head and facial congestion with some tenderness to maxillary sinus. CT sinuses revealed sinusitis and due to immunocompromised state, ENT consulted. Endoscopy performed with no evidence of allergic fungal or invasive fungal. Augmentin started per ENT recs. The DAUGHERTY and overall fatigue/malaise likely multifactorial due to COVID, pHTN, possible TONYA, acute on possibly chronic sinusitis and anemia of chronic disease with Hgb lower than previous baseline. At this time, pt has medically improved and has reached maximum benefit of inpatient hospitalization. Walking O2 saturations checked and pt did not qualify for home oxygen. Pt is stable for discharge to follow up with repeat labs, PCP, Nephrology, ENT and Cardiology.     Of note, Pt with history of atrial fibrillation but underwent ablation last year and as far as he knows has remained in NSR since. Telemetry reviewed with no irregular rhythms reported. Discontinued telemetry during hospitalization at pt request.

## 2023-02-08 NOTE — SUBJECTIVE & OBJECTIVE
Subjective:     History of Present Illness:   67 year old male with KT 2005 who presented to the ED from home after not having improvement of SOB, diarrhea from diagnosis of covid 1 month ago. Reports visiting our ED and testing positive for covid, but being stable enough to go home. No interventions were provided at that time. He remained on all immunosuppressants. After going home he did not improve and did home testing kits were negative. He had periodic every other day large volume diarrhea, poor appetite, fatigue, SBOE. Fevers after initial diagnosis resolved relatively quickly. Also reports worsening tremor.   Denies syncope, chest pain, abdominal pain, nausea, vomiting, changes in urination, hematuria, hematochezia, melena        Past Medical and Surgical History: Mr. Phelps has a past medical history of (HFpEF) heart failure with preserved ejection fraction (9/25/2017), Atrial fibrillation, CAD (coronary artery disease), CHF (congestive heart failure), Chronic diastolic heart failure (4/8/2020), CKD (chronic kidney disease) stage 3, GFR 30-59 ml/min, CKD (chronic kidney disease) stage 3, GFR 30-59 ml/min, Diverticulosis, Fever blister, Heart attack (2006), Hyperlipidemia, Hypertension, Immunodeficiency due to treatment with immunosuppressive medication, Keloid cicatrix, Left lower quadrant abdominal pain (6/5/2022), Metabolic bone disease, Pericarditis, S/P kidney transplant, Supraventricular tachycardia (06/2019), Thrombocytopenia, Thyroid disease, Tobacco use (9/5/2014), and Unspecified disorder of kidney and ureter.  He has a past surgical history that includes Cholecystectomy; Cardioversion (04/30/15); Kidney transplant (2005); Parathyroidectomy; Colonoscopy (April 20, 2011); Colonoscopy (N/A, 3/13/2020); Colonoscopy (N/A, 2/4/2021); Colonoscopy (N/A, 3/3/2021); Coronary angioplasty with stent (9/13/2006); and Treatment of cardiac arrhythmia (N/A, 3/28/2022).    Past Social and Family History:   "Lenin reports that he quit smoking about a year ago. His smoking use included cigarettes. He has a 20.00 pack-year smoking history. He has never used smokeless tobacco. He reports that he does not drink alcohol and does not use drugs.His family history includes Heart disease in his father; Kidney disease in his brother and sister; Kidney failure in his brother and sister; No Known Problems in his brother, sister, sister, and sister; Thyroid disease in his mother.    Intake/Output - Last 3 Shifts         02/06 0700  02/07 0659 02/07 0700  02/08 0659 02/08 0700  02/09 0659           Urine Occurrence   2 x             Review of Systems   Constitutional:  Positive for fatigue.   HENT: Negative.     Eyes: Negative.    Respiratory:  Positive for cough and shortness of breath.    Cardiovascular: Negative.    Gastrointestinal:  Positive for diarrhea.   Endocrine: Negative.    Genitourinary: Negative.    Musculoskeletal: Negative.    Skin: Negative.    Neurological:  Positive for tremors and weakness.   Psychiatric/Behavioral: Negative.     Objective:     Vital Signs (Most Recent):  Temp: 98.2 °F (36.8 °C) (02/08/23 1132)  Pulse: 72 (02/08/23 1132)  Resp: 15 (02/08/23 1132)  BP: (!) 146/68 (02/08/23 1132)  SpO2: 96 % (02/08/23 1132)   Vital Signs (24h Range):  Temp:  [97.5 °F (36.4 °C)-98.9 °F (37.2 °C)] 98.2 °F (36.8 °C)  Pulse:  [62-85] 72  Resp:  [15-20] 15  SpO2:  [95 %-100 %] 96 %  BP: (124-148)/(60-75) 146/68     Weight: 101.9 kg (224 lb 10.4 oz)  Height: 6' 1" (185.4 cm)  Body mass index is 29.64 kg/m².    Physical Exam  Vitals and nursing note reviewed.   Constitutional:       General: He is not in acute distress.     Appearance: He is ill-appearing.   Eyes:      General: No scleral icterus.  Cardiovascular:      Rate and Rhythm: Normal rate.   Pulmonary:      Effort: Pulmonary effort is normal. No respiratory distress.   Abdominal:      General: There is no distension.      Palpations: Abdomen is soft. "   Musculoskeletal:      Right lower leg: No edema.      Left lower leg: No edema.   Skin:     General: Skin is dry.      Coloration: Skin is not jaundiced.   Neurological:      Mental Status: He is alert.       Significant Labs:  Labs within the past 24 hours have been reviewed.    Diagnostics:  Chest X-Ray: No results found. However, due to the size of the patient record, not all encounters were searched. Please check Results Review for a complete set of results.  US - Kidney: Results for orders placed during the hospital encounter of 06/04/22    US Transplant Kidney With Doppler    Narrative  EXAMINATION:  US TRANSPLANT KIDNEY WITH DOPPLER    CLINICAL HISTORY:  LUQ/LLQ severe pain, hx transplant;    TECHNIQUE:  Real time gray scale and doppler ultrasound was performed over the patient's renal allograft.    COMPARISON:  CT renal stone study 06/04/2022    Renal transplant ultrasound 05/20/2022    FINDINGS:  Renal allograft in the left lower quadrant.  The allograft measures 11.5 cm. Normal perfusion. No hydronephrosis.    No fluid collections.    Vasculature:    Resistive indices are as follow:    Interlobar artery: 1.0 (previously 0.8)    Upper segmental: 0.83 (previously 0.81)    Mid segmental: 0.83 (previously 0.88)    Lower segmental: 0.84 (previously 0.83)    Main renal artery peak systolic velocity: 173cm/sec with normal waveform, (previously 200cm/sec).    Renal artery/iliac ratio: 1.0.    The main renal vein is patent.    Impression  Elevated arterial resistive indices may be seen in setting of transplant rejection, drug toxicity or medical renal disease.    Electronically signed by resident: Afsaneh Hanna  Date:    06/04/2022  Time:    23:59    Electronically signed by: Chilo Ward MD  Date:    06/05/2022  Time:    00:40

## 2023-02-08 NOTE — ASSESSMENT & PLAN NOTE
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 Latif  BL sCr 2.2-2.7  Continue immunosuppression as per problem "Long-term use of immunosuppressant medication"    "

## 2023-02-09 DIAGNOSIS — Z00.00 ENCOUNTER FOR MEDICARE ANNUAL WELLNESS EXAM: ICD-10-CM

## 2023-02-09 PROBLEM — R09.81 SINUS CONGESTION: Status: ACTIVE | Noted: 2023-02-09

## 2023-02-09 LAB
ABO + RH BLD: NORMAL
ABO + RH BLD: NORMAL
ANION GAP SERPL CALC-SCNC: 13 MMOL/L (ref 8–16)
BACTERIA UR CULT: NORMAL
BACTERIA UR CULT: NORMAL
BASOPHILS # BLD AUTO: 0 K/UL (ref 0–0.2)
BASOPHILS # BLD AUTO: 0 K/UL (ref 0–0.2)
BASOPHILS NFR BLD: 0 % (ref 0–1.9)
BASOPHILS NFR BLD: 0 % (ref 0–1.9)
BLD GP AB SCN CELLS X3 SERPL QL: NORMAL
BUN SERPL-MCNC: 43 MG/DL (ref 8–23)
CALCIUM SERPL-MCNC: 6.7 MG/DL (ref 8.7–10.5)
CHLORIDE SERPL-SCNC: 104 MMOL/L (ref 95–110)
CO2 SERPL-SCNC: 23 MMOL/L (ref 23–29)
CREAT SERPL-MCNC: 2.9 MG/DL (ref 0.5–1.4)
D DIMER PPP IA.FEU-MCNC: 0.39 MG/L FEU
DIFFERENTIAL METHOD: ABNORMAL
DIFFERENTIAL METHOD: ABNORMAL
EOSINOPHIL # BLD AUTO: 0 K/UL (ref 0–0.5)
EOSINOPHIL # BLD AUTO: 0 K/UL (ref 0–0.5)
EOSINOPHIL NFR BLD: 0 % (ref 0–8)
EOSINOPHIL NFR BLD: 0 % (ref 0–8)
ERYTHROCYTE [DISTWIDTH] IN BLOOD BY AUTOMATED COUNT: 15.2 % (ref 11.5–14.5)
ERYTHROCYTE [DISTWIDTH] IN BLOOD BY AUTOMATED COUNT: 15.3 % (ref 11.5–14.5)
EST. GFR  (NO RACE VARIABLE): 22.8 ML/MIN/1.73 M^2
FERRITIN SERPL-MCNC: 2586 NG/ML (ref 20–300)
GLUCOSE SERPL-MCNC: 120 MG/DL (ref 70–110)
HCT VFR BLD AUTO: 21.5 % (ref 40–54)
HCT VFR BLD AUTO: 26.7 % (ref 40–54)
HGB BLD-MCNC: 6.7 G/DL (ref 14–18)
HGB BLD-MCNC: 8.2 G/DL (ref 14–18)
IMM GRANULOCYTES # BLD AUTO: 0.1 K/UL (ref 0–0.04)
IMM GRANULOCYTES # BLD AUTO: 0.12 K/UL (ref 0–0.04)
IMM GRANULOCYTES NFR BLD AUTO: 2 % (ref 0–0.5)
IMM GRANULOCYTES NFR BLD AUTO: 2.8 % (ref 0–0.5)
LYMPHOCYTES # BLD AUTO: 0.4 K/UL (ref 1–4.8)
LYMPHOCYTES # BLD AUTO: 0.6 K/UL (ref 1–4.8)
LYMPHOCYTES NFR BLD: 11.2 % (ref 18–48)
LYMPHOCYTES NFR BLD: 8.2 % (ref 18–48)
MCH RBC QN AUTO: 23.8 PG (ref 27–31)
MCH RBC QN AUTO: 23.9 PG (ref 27–31)
MCHC RBC AUTO-ENTMCNC: 30.7 G/DL (ref 32–36)
MCHC RBC AUTO-ENTMCNC: 31.2 G/DL (ref 32–36)
MCV RBC AUTO: 77 FL (ref 82–98)
MCV RBC AUTO: 77 FL (ref 82–98)
MONOCYTES # BLD AUTO: 0.4 K/UL (ref 0.3–1)
MONOCYTES # BLD AUTO: 0.5 K/UL (ref 0.3–1)
MONOCYTES NFR BLD: 10.2 % (ref 4–15)
MONOCYTES NFR BLD: 8.7 % (ref 4–15)
NEUTROPHILS # BLD AUTO: 3.4 K/UL (ref 1.8–7.7)
NEUTROPHILS # BLD AUTO: 3.8 K/UL (ref 1.8–7.7)
NEUTROPHILS NFR BLD: 76.6 % (ref 38–73)
NEUTROPHILS NFR BLD: 80.3 % (ref 38–73)
NRBC BLD-RTO: 0 /100 WBC
NRBC BLD-RTO: 0 /100 WBC
PLATELET # BLD AUTO: 138 K/UL (ref 150–450)
PLATELET # BLD AUTO: 170 K/UL (ref 150–450)
PMV BLD AUTO: 11.2 FL (ref 9.2–12.9)
PMV BLD AUTO: 12 FL (ref 9.2–12.9)
POTASSIUM SERPL-SCNC: 3.9 MMOL/L (ref 3.5–5.1)
RBC # BLD AUTO: 2.8 M/UL (ref 4.6–6.2)
RBC # BLD AUTO: 3.45 M/UL (ref 4.6–6.2)
SODIUM SERPL-SCNC: 140 MMOL/L (ref 136–145)
TACROLIMUS BLD-MCNC: 6.4 NG/ML (ref 5–15)
WBC # BLD AUTO: 4.25 K/UL (ref 3.9–12.7)
WBC # BLD AUTO: 4.99 K/UL (ref 3.9–12.7)
WBC #/AREA STL HPF: NORMAL /[HPF]

## 2023-02-09 PROCEDURE — 80048 BASIC METABOLIC PNL TOTAL CA: CPT | Performed by: FAMILY MEDICINE

## 2023-02-09 PROCEDURE — 63600175 PHARM REV CODE 636 W HCPCS: Performed by: STUDENT IN AN ORGANIZED HEALTH CARE EDUCATION/TRAINING PROGRAM

## 2023-02-09 PROCEDURE — 63600175 PHARM REV CODE 636 W HCPCS: Mod: TB | Performed by: STUDENT IN AN ORGANIZED HEALTH CARE EDUCATION/TRAINING PROGRAM

## 2023-02-09 PROCEDURE — 25000003 PHARM REV CODE 250: Performed by: FAMILY MEDICINE

## 2023-02-09 PROCEDURE — 87046 STOOL CULTR AEROBIC BACT EA: CPT | Mod: 59

## 2023-02-09 PROCEDURE — 80197 ASSAY OF TACROLIMUS: CPT | Performed by: STUDENT IN AN ORGANIZED HEALTH CARE EDUCATION/TRAINING PROGRAM

## 2023-02-09 PROCEDURE — 94664 DEMO&/EVAL PT USE INHALER: CPT

## 2023-02-09 PROCEDURE — 85025 COMPLETE CBC W/AUTO DIFF WBC: CPT

## 2023-02-09 PROCEDURE — 25000003 PHARM REV CODE 250: Performed by: STUDENT IN AN ORGANIZED HEALTH CARE EDUCATION/TRAINING PROGRAM

## 2023-02-09 PROCEDURE — 36415 COLL VENOUS BLD VENIPUNCTURE: CPT | Performed by: STUDENT IN AN ORGANIZED HEALTH CARE EDUCATION/TRAINING PROGRAM

## 2023-02-09 PROCEDURE — 85379 FIBRIN DEGRADATION QUANT: CPT

## 2023-02-09 PROCEDURE — 87045 FECES CULTURE AEROBIC BACT: CPT

## 2023-02-09 PROCEDURE — 87427 SHIGA-LIKE TOXIN AG IA: CPT

## 2023-02-09 PROCEDURE — 63600175 PHARM REV CODE 636 W HCPCS

## 2023-02-09 PROCEDURE — 63600175 PHARM REV CODE 636 W HCPCS: Performed by: FAMILY MEDICINE

## 2023-02-09 PROCEDURE — 99233 PR SUBSEQUENT HOSPITAL CARE,LEVL III: ICD-10-PCS | Mod: CR,,, | Performed by: FAMILY MEDICINE

## 2023-02-09 PROCEDURE — 86900 BLOOD TYPING SEROLOGIC ABO: CPT | Mod: 91 | Performed by: FAMILY MEDICINE

## 2023-02-09 PROCEDURE — 25000242 PHARM REV CODE 250 ALT 637 W/ HCPCS: Performed by: STUDENT IN AN ORGANIZED HEALTH CARE EDUCATION/TRAINING PROGRAM

## 2023-02-09 PROCEDURE — 89055 LEUKOCYTE ASSESSMENT FECAL: CPT

## 2023-02-09 PROCEDURE — 85025 COMPLETE CBC W/AUTO DIFF WBC: CPT | Mod: 91 | Performed by: FAMILY MEDICINE

## 2023-02-09 PROCEDURE — 27000207 HC ISOLATION

## 2023-02-09 PROCEDURE — 25000003 PHARM REV CODE 250

## 2023-02-09 PROCEDURE — 36415 COLL VENOUS BLD VENIPUNCTURE: CPT | Performed by: FAMILY MEDICINE

## 2023-02-09 PROCEDURE — 82728 ASSAY OF FERRITIN: CPT

## 2023-02-09 PROCEDURE — 94761 N-INVAS EAR/PLS OXIMETRY MLT: CPT

## 2023-02-09 PROCEDURE — 12000002 HC ACUTE/MED SURGE SEMI-PRIVATE ROOM

## 2023-02-09 PROCEDURE — 99233 SBSQ HOSP IP/OBS HIGH 50: CPT | Mod: CR,,, | Performed by: FAMILY MEDICINE

## 2023-02-09 PROCEDURE — 83993 ASSAY FOR CALPROTECTIN FECAL: CPT

## 2023-02-09 PROCEDURE — 99233 PR SUBSEQUENT HOSPITAL CARE,LEVL III: ICD-10-PCS | Mod: ,,, | Performed by: INTERNAL MEDICINE

## 2023-02-09 PROCEDURE — 99900035 HC TECH TIME PER 15 MIN (STAT)

## 2023-02-09 PROCEDURE — 94640 AIRWAY INHALATION TREATMENT: CPT

## 2023-02-09 PROCEDURE — 27100098 HC SPACER

## 2023-02-09 PROCEDURE — 86900 BLOOD TYPING SEROLOGIC ABO: CPT | Performed by: FAMILY MEDICINE

## 2023-02-09 PROCEDURE — 99233 SBSQ HOSP IP/OBS HIGH 50: CPT | Mod: ,,, | Performed by: INTERNAL MEDICINE

## 2023-02-09 PROCEDURE — 36415 COLL VENOUS BLD VENIPUNCTURE: CPT

## 2023-02-09 PROCEDURE — 87425 ROTAVIRUS AG IA: CPT

## 2023-02-09 RX ORDER — SODIUM CHLORIDE 9 MG/ML
INJECTION, SOLUTION INTRAVENOUS CONTINUOUS
Status: DISCONTINUED | OUTPATIENT
Start: 2023-02-09 | End: 2023-02-10 | Stop reason: HOSPADM

## 2023-02-09 RX ORDER — PREDNISONE 5 MG/1
5 TABLET ORAL DAILY
Status: DISCONTINUED | OUTPATIENT
Start: 2023-02-09 | End: 2023-02-10 | Stop reason: HOSPADM

## 2023-02-09 RX ADMIN — ASPIRIN 81 MG: 81 TABLET, COATED ORAL at 09:02

## 2023-02-09 RX ADMIN — TACROLIMUS 2 MG: 1 CAPSULE ORAL at 09:02

## 2023-02-09 RX ADMIN — Medication 500 MG: at 09:02

## 2023-02-09 RX ADMIN — ALBUTEROL SULFATE 2 PUFF: 108 INHALANT RESPIRATORY (INHALATION) at 03:02

## 2023-02-09 RX ADMIN — REMDESIVIR 100 MG: 100 INJECTION, POWDER, LYOPHILIZED, FOR SOLUTION INTRAVENOUS at 08:02

## 2023-02-09 RX ADMIN — FLUTICASONE PROPIONATE 50 MCG: 50 SPRAY, METERED NASAL at 09:02

## 2023-02-09 RX ADMIN — MUPIROCIN: 20 OINTMENT TOPICAL at 09:02

## 2023-02-09 RX ADMIN — PARICALCITOL 1 MCG: 1 CAPSULE, LIQUID FILLED ORAL at 09:02

## 2023-02-09 RX ADMIN — TACROLIMUS 2 MG: 1 CAPSULE ORAL at 06:02

## 2023-02-09 RX ADMIN — NIFEDIPINE 60 MG: 30 TABLET, FILM COATED, EXTENDED RELEASE ORAL at 09:02

## 2023-02-09 RX ADMIN — APIXABAN 5 MG: 5 TABLET, FILM COATED ORAL at 09:02

## 2023-02-09 RX ADMIN — CALCIUM CARBONATE (ANTACID) CHEW TAB 500 MG 1000 MG: 500 CHEW TAB at 09:02

## 2023-02-09 RX ADMIN — ALBUTEROL SULFATE 2 PUFF: 108 INHALANT RESPIRATORY (INHALATION) at 08:02

## 2023-02-09 RX ADMIN — METOPROLOL SUCCINATE 25 MG: 25 TABLET, EXTENDED RELEASE ORAL at 09:02

## 2023-02-09 RX ADMIN — ATORVASTATIN CALCIUM 10 MG: 10 TABLET, FILM COATED ORAL at 09:02

## 2023-02-09 RX ADMIN — DOXAZOSIN 4 MG: 2 TABLET ORAL at 09:02

## 2023-02-09 RX ADMIN — GABAPENTIN 300 MG: 300 CAPSULE ORAL at 09:02

## 2023-02-09 RX ADMIN — FAMOTIDINE 20 MG: 20 TABLET ORAL at 09:02

## 2023-02-09 RX ADMIN — SODIUM CHLORIDE: 9 INJECTION, SOLUTION INTRAVENOUS at 05:02

## 2023-02-09 RX ADMIN — PREDNISONE 5 MG: 5 TABLET ORAL at 09:02

## 2023-02-09 RX ADMIN — FUROSEMIDE 20 MG: 10 INJECTION, SOLUTION INTRAMUSCULAR; INTRAVENOUS at 09:02

## 2023-02-09 RX ADMIN — CETIRIZINE HYDROCHLORIDE 5 MG: 10 TABLET, FILM COATED ORAL at 09:02

## 2023-02-09 RX ADMIN — FLUTICASONE FUROATE AND VILANTEROL TRIFENATATE 1 PUFF: 100; 25 POWDER RESPIRATORY (INHALATION) at 09:02

## 2023-02-09 RX ADMIN — POLYETHYLENE GLYCOL 3350 17 G: 17 POWDER, FOR SOLUTION ORAL at 09:02

## 2023-02-09 RX ADMIN — THERA TABS 1 TABLET: TAB at 09:02

## 2023-02-09 RX ADMIN — FLECAINIDE ACETATE 50 MG: 50 TABLET ORAL at 09:02

## 2023-02-09 RX ADMIN — SODIUM BICARBONATE: 84 INJECTION, SOLUTION INTRAVENOUS at 08:02

## 2023-02-09 NOTE — PLAN OF CARE
[facility] - [facility] ([place] [address])  Initial Discharge Assessment       Primary Care Provider: Connie Magdaleno MD    Admission Diagnosis: Shortness of breath [R06.02]  Multiple complaints [R68.89]  KATINA (acute kidney injury) [N17.9]  Generalized weakness [R53.1]  COVID-19 virus infection [U07.1]    Admission Date: 2/7/2023  Expected Discharge Date: 2/10/2023    Discharge Barriers Identified: None    Payor: [employer] MANAGED MEDICARE / Plan: [employer] MEDICARE HMO / Product Type: Capitation /     Extended Emergency Contact Information  Primary Emergency Contact: Shea Phelps  Address: [address] [place] [address] [facility]  Home Phone: [phone]  Mobile Phone: [phone]  Relation: Spouse  Preferred language: English    Discharge Plan A: Home, Home with family, Other (referred for OPCM)  Discharge Plan B: Home, Home with family      [id number] - [place], [place] - [address]  [address]  [facility] [address]  Phone: [phone] Fax: [phone]    [facility] - [facility] [address]  [address] [facility] [address]  Phone: [phone] Fax: [phone]      Initial Assessment (most recent)       Adult Discharge Assessment - 02/09/23 1506          Discharge Assessment    Assessment Type Discharge Planning Assessment     Confirmed/corrected address, phone number and insurance Yes     Confirmed Demographics Correct on Facesheet     Source of Information patient     When was your last doctors appointment? --   One month ago.    Communicated RAMU with patient/caregiver Yes     Reason For Admission Acute renal failure superimposed on stage 4 chronic kidney disease     People in Home spouse     Do you expect to return to your current living situation? Yes     Do you have help at home or someone to help you manage your care at home? Yes     Who are your caregiver(s) and their phone number(s)? Shea  [phone]     Prior to hospitilization cognitive status: Alert/Oriented     Current cognitive status: Alert/Oriented     Equipment Currently Used at Home none     Readmission within 30 days? No     Patient currently being followed by outpatient case management? No     Do you currently have service(s) that help you manage your care at home? No     Do you take prescription medications? Yes     Do you have prescription coverage? Yes     Coverage [employer]     Do you have any problems affording any of your prescribed medications? No     Is the patient taking medications as prescribed? yes     Who is going to help you get home at discharge? Pt's wife will provide  transportation home.     How do you get to doctors appointments? car, drives self     Are you on dialysis? No     Do you take coumadin? No     Discharge Plan A Home;Home with family;Other   referred for OPCM    Discharge Plan B Home;Home with family     DME Needed Upon Discharge  none     Discharge Plan discussed with: Patient     Discharge Barriers Identified None        Financial Resource Strain    How hard is it for you to pay for the very basics like food, housing, medical care, and heating? Not hard at all        Housing Stability    In the last 12 months, was there a time when you were not able to pay the mortgage or rent on time? No     In the last 12 months, was there a time when you did not have a steady place to sleep or slept in a shelter (including now)? No        Transportation Needs    In the past 12 months, has lack of transportation kept you from medical appointments or from getting medications? No     In the past 12 months, has lack of transportation kept you from meetings, work, or from getting things needed for daily living? No        Food Insecurity    Within the past 12 months, the food you bought just didn't last and you didn't have money to get more. Never true        Stress    Do you feel stress - tense,  restless, nervous, or anxious, or unable to sleep at night because your mind is troubled all the time - these days? Only a little        Social Connections    In a typical week, how many times do you talk on the phone with family, friends, or neighbors? More than three times a week     How often do you get together with friends or relatives? More than three times a week        OTHER    Name(s) of People in Home Shea Phelps-Spouse.                      SW completed assessment with pt.  No hospital readmissions within the last 30 days. Pt's wife will provide transportation home.  Referral to OPCM.    Pt lives with his wife in a [facility] with one step for entry.  Pt reported he is independent with ADLs and mobility.  Pt reported he does not use any DME at home.  Pt reported his wife is available to provide help at home.      Pt does not receive dialysis, HH, or coumadin.  Pt reported he currently takes Eliquis.  Pt reported he is compliant with his medications and does not have an issue paying for meds.      Disp:  Home with referral fo OPCM.    Meredith Marks LMSW  PRN-  [facility]  Ext. [phone]

## 2023-02-09 NOTE — ASSESSMENT & PLAN NOTE
-Patient's Hb is currently stable  -Monitor serial CBC and transfuse if patient becomes hemodynamically unstable, symptomatic or H/H drops below 7/21.   -No signs to suggest acute GI bleed.

## 2023-02-09 NOTE — SUBJECTIVE & OBJECTIVE
Interval History: Pt seen and examined resting comfortably in bed. Pt just took a shower and does report continued dyspnea on exertion. Denies any SOB at rest, chest pain, fevers/chills. Cough has resolved. He complains of frontal head/sinus congestion with mild maxillary sinus tenderness. He denies rhinorrhea, ear pain/fullness/discharge, or sore throat. Mr. Phelps mentions that he was diagnosed with COVID 1 month ago and DAUGHERTY has persisted since then. Pt has chronic rt hand intention tremor that has worsened since COVID diagnosis.     Review of Systems   Constitutional:  Positive for fatigue. Negative for chills and fever.   HENT:  Positive for congestion and sinus pain. Negative for ear discharge, ear pain, rhinorrhea and sore throat.    Respiratory:  Negative for cough, chest tightness and shortness of breath (on exertion).    Cardiovascular:  Negative for chest pain, palpitations and leg swelling.   Gastrointestinal:  Negative for abdominal pain and nausea.   Neurological:  Positive for tremors (chronic, right hand). Negative for dizziness and weakness.   Objective:     Vital Signs (Most Recent):  Temp: 97.8 °F (36.6 °C) (02/09/23 1154)  Pulse: 62 (02/09/23 1447)  Resp: 18 (02/09/23 1154)  BP: (!) 150/70 (02/09/23 1154)  SpO2: (!) 93 % (02/09/23 1154)   Vital Signs (24h Range):  Temp:  [97.5 °F (36.4 °C)-98.5 °F (36.9 °C)] 97.8 °F (36.6 °C)  Pulse:  [60-84] 62  Resp:  [15-20] 18  SpO2:  [93 %-97 %] 93 %  BP: (130-150)/(64-70) 150/70     Weight: 101.9 kg (224 lb 10.4 oz)  Body mass index is 29.64 kg/m².    Intake/Output Summary (Last 24 hours) at 2/9/2023 1506  Last data filed at 2/9/2023 0725  Gross per 24 hour   Intake 120 ml   Output 1900 ml   Net -1780 ml      Physical Exam  Vitals and nursing note reviewed.   Constitutional:       General: He is not in acute distress.     Appearance: He is well-developed.   HENT:      Head:      Comments: Mild TTP of BL maxillary sinus     Mouth/Throat:      Mouth: Mucous  membranes are moist.   Eyes:      Conjunctiva/sclera: Conjunctivae normal.   Cardiovascular:      Rate and Rhythm: Normal rate and regular rhythm.   Pulmonary:      Effort: Pulmonary effort is normal.      Breath sounds: Normal breath sounds.   Abdominal:      Palpations: Abdomen is soft.      Tenderness: There is no abdominal tenderness.   Skin:     General: Skin is warm and dry.   Neurological:      Mental Status: He is alert.      Motor: Tremor (intermittent rt hand tremor noted) present.   Psychiatric:         Behavior: Behavior normal.       Significant Labs: All pertinent labs within the past 24 hours have been reviewed.  Recent Lab Results         02/09/23  1015   02/09/23  0853   02/09/23  0218   02/08/23  1551        Anion Gap 13       15       Baso # 0.00     0.00         Basophil % 0.0     0.0         BUN 43       39       Calcium 6.7  Comment: *Critical value notification by __ with confirmation of receipt to  paul velasquez rn___ at  Date_02/09___Time_11:17am___         6.6  Comment: *Critical value notification by   with confirmation of receipt to  Simba Guan RN  at  [id number]:20         Ionized Calcium       0.83       Chloride 104       109       CO2 23       15       Creatinine 2.9       3.2       Differential Method Automated     Automated         eGFR 22.8       20.3       Eos # 0.0     0.0         Eosinophil % 0.0     0.0         Glucose 120       137       Gran # (ANC) 3.8     3.4         Gran % 76.6     80.3         Group & Rh B POS   B POS           Hematocrit 26.7     21.5         Hemoglobin 8.2     6.7         Immature Grans (Abs) 0.10  Comment: Mild elevation in immature granulocytes is non specific and   can be seen in a variety of conditions including stress response,   acute inflammation, trauma and pregnancy. Correlation with other   laboratory and clinical findings is essential.       0.12  Comment: Mild elevation in immature granulocytes is non specific and   can be seen  in a variety of conditions including stress response,   acute inflammation, trauma and pregnancy. Correlation with other   laboratory and clinical findings is essential.           Immature Granulocytes 2.0     2.8         INDIRECT HONORIO   NEG           Lymph # 0.6     0.4         Lymph % 11.2     8.2         MCH 23.8     23.9         MCHC 30.7     31.2         MCV 77     77         Mono # 0.5     0.4         Mono % 10.2     8.7         MPV 11.2     12.0         nRBC 0     0         Platelets 170     138         Potassium 3.9       4.6       RBC 3.45     2.80         RDW 15.3     15.2         Sodium 140       139       Tacrolimus Lvl     6.4  Comment: Testing performed by a chemiluminescent microparticle   immunoassay on the [employer].    CAUTION: No firm therapeutic range exists for tacrolimus in whole   blood. The   complexity of the clinical state, individual differences in   sensitivity to   immunosuppressive and nephrotoxic effects of tacrolimus,   co-administration   of other immunosuppressants, type of transplant, time post-transplant   and a   number of other factors contribute to different requirements for   optimal   blood levels of tacrolimus. Therefore, individual tacrolimus values   cannot   be used as the sole indicator for making changes in treatment regimen   and   each patient should be thoroughly evaluated clinically before changes   in   treatment regimens are made. Each user must establish his or her own   ranges   based on clinical experience.  Therapeutic ranges vary according to the commercial test used, and   therefore   should be established for each commercial test. Values obtained with   different assay methods cannot be used interchangeably due to   differences in   assay methods and cross-reactivity with metabolites, nor should   correction   factors be applied. Therefore, consistent use of one assay for   individual   patients is recommended.           WBC 4.99     4.25                  Significant Imaging: I have reviewed all pertinent imaging results/findings within the past 24 hours.    Renal US with doppler 2/8/23:  Impression:       Elevated arterial resistive indices, overall similar to prior, nonspecific finding which may reflect rejection, drug toxicity, medical renal disease.     Otherwise, satisfactory gray scale and doppler evaluation of renal allograft.

## 2023-02-09 NOTE — PLAN OF CARE
Problem: Fluid and Electrolyte Imbalance (Acute Kidney Injury/Impairment)  Goal: Fluid and Electrolyte Balance  Outcome: Ongoing, Progressing     Problem: Oral Intake Inadequate (Acute Kidney Injury/Impairment)  Goal: Optimal Nutrition Intake  Outcome: Ongoing, Progressing   No BM noted. Receiving cont Sodium bicarb at 100mL/hr. Iv abx given. IV diuril administered. Free from pain. Bed locked and in lowest position. Call light in reach.

## 2023-02-09 NOTE — ASSESSMENT & PLAN NOTE
-  (baseline). No evidence of volume overload on exam. CXR clear.   - last echo reviewed, repeat echo pending    Results for orders placed during the hospital encounter of 03/21/22    Echo    Interpretation Summary  · Moderate left atrial enlargement.  · The left ventricle is normal in size with concentric remodeling and normal systolic function.  · The estimated ejection fraction is 55%.  · There are segmental left ventricular wall motion abnormalities : akinetic basal inferior wall.  · Indeterminate left ventricular diastolic function.  · Mild right atrial enlargement.  · Normal right ventricular size with normal right ventricular systolic function.  · There is mild aortic valve stenosis.  · Aortic valve area is 1.84 cm2; peak velocity is 2.60 m/s; mean gradient is 13 mmHg.  · Mild aortic regurgitation.  · Mild pulmonic regurgitation.  · The estimated PA systolic pressure is 25 mmHg.  · Normal central venous pressure (3 mmHg).

## 2023-02-09 NOTE — PROGRESS NOTES
[facility] - Intensive Care ([address] Medicine  Progress Note    Patient Name: Ibrahima Phelps  MRN: [id number]  Patient Class: IP- Inpatient   Admission Date: 2/7/2023  Length of Stay: 2 days  Attending Physician: Kt Luna III,*  Primary Care Provider: Connie Magdaleno MD        Subjective:     Principal Problem:Acute renal failure superimposed on stage 4 chronic kidney disease        HPI:  Ibrahima Phelps is a 66 yo male with HTN, HLD, CAD, CHF, CKD4 s/o kidney transplant on chronic immunosuppressive therapy, who presents to the emergency department with persistent DAUGHERTY since having COVID 1 month ago. He came to the ED at that time, but was discharge home. Since then, he becomes severely fatigued and SOB after walking minimal distances. He endorses associated coughing, intermittent headaches, intermittent hoarseness.  Endorses daily diarrhea for the past month, and decreased appetite. He has also had some intermittent fevers, although none recently.  He denies any associated chest pain or palpitations, wheezing, congestion, abdominal pain, N/V, lightheadedness, weakness, falls, urinary symptoms. The wife does report that nebulizer treatments have been successful in improving his symptoms.     In the ED: , otherwise VSS. Maintaining sats on room air. CBC with stable chronic anemia. CMP with bicarb 15 (anion gap wnl), BUN 37/ Cr 3.5 (baseline 2.8). , trop 0.062 - both baseline. EKG without ischemia. Lactate 1.3. Positive for covid. CXR without acute process. Given duo-nebs x1, 30cc/kg NS resuscitation and IV zosyn.       Overview/Hospital Course:  Transplant nephrology consulted on admission.  KATINA with slow improvement following IV fluid resuscitation, previous Cr baseline 2.2-2.7 per nephrology.  Patient developed hyperkalemia 2/8.  Patient initiated on IV fluids with isotonic bicarb in addition to IV Lasix and IV thiazide for kaliuresis. Potassium normalized. Given patient high risk for  COVID progression, initiated on remdesivir. Pt has remained on room air so decadron discontinued and home prednisone started. Pt complained of some head and facial congestion with some tenderness to maxillary sinus, CT sinuses ordered. Pt does have history of atrial fibrillation but underwent ablation last year and as far as he knows has remained in NSR since. Telemetry reviewed with no irregular rhythms reported. Discontinued telemetry at pt request.        Interval History: Pt seen and examined resting comfortably in bed. Pt just took a shower and does report continued dyspnea on exertion. Denies any SOB at rest, chest pain, fevers/chills. Cough has resolved. He complains of frontal head/sinus congestion with mild maxillary sinus tenderness. He denies rhinorrhea, ear pain/fullness/discharge, or sore throat. Mr. Phelps mentions that he was diagnosed with COVID 1 month ago and DAUGHERTY has persisted since then. Pt has chronic rt hand intention tremor that has worsened since COVID diagnosis.     Review of Systems   Constitutional:  Positive for fatigue. Negative for chills and fever.   HENT:  Positive for congestion and sinus pain. Negative for ear discharge, ear pain, rhinorrhea and sore throat.    Respiratory:  Negative for cough, chest tightness and shortness of breath (on exertion).    Cardiovascular:  Negative for chest pain, palpitations and leg swelling.   Gastrointestinal:  Negative for abdominal pain and nausea.   Neurological:  Positive for tremors (chronic, right hand). Negative for dizziness and weakness.   Objective:     Vital Signs (Most Recent):  Temp: 97.8 °F (36.6 °C) (02/09/23 1154)  Pulse: 62 (02/09/23 1447)  Resp: 18 (02/09/23 1154)  BP: (!) 150/70 (02/09/23 1154)  SpO2: (!) 93 % (02/09/23 1154)   Vital Signs (24h Range):  Temp:  [97.5 °F (36.4 °C)-98.5 °F (36.9 °C)] 97.8 °F (36.6 °C)  Pulse:  [60-84] 62  Resp:  [15-20] 18  SpO2:  [93 %-97 %] 93 %  BP: (130-150)/(64-70) 150/70     Weight: 101.9 kg (224  lb 10.4 oz)  Body mass index is 29.64 kg/m².    Intake/Output Summary (Last 24 hours) at 2/9/2023 1506  Last data filed at 2/9/2023 0725  Gross per 24 hour   Intake 120 ml   Output 1900 ml   Net -1780 ml      Physical Exam  Vitals and nursing note reviewed.   Constitutional:       General: He is not in acute distress.     Appearance: He is well-developed.   HENT:      Head:      Comments: Mild TTP of BL maxillary sinus     Mouth/Throat:      Mouth: Mucous membranes are moist.   Eyes:      Conjunctiva/sclera: Conjunctivae normal.   Cardiovascular:      Rate and Rhythm: Normal rate and regular rhythm.   Pulmonary:      Effort: Pulmonary effort is normal.      Breath sounds: Normal breath sounds.   Abdominal:      Palpations: Abdomen is soft.      Tenderness: There is no abdominal tenderness.   Skin:     General: Skin is warm and dry.   Neurological:      Mental Status: He is alert.      Motor: Tremor (intermittent rt hand tremor noted) present.   Psychiatric:         Behavior: Behavior normal.       Significant Labs: All pertinent labs within the past 24 hours have been reviewed.  Recent Lab Results         02/09/23  1015   02/09/23  0853   02/09/23  0218   02/08/23  1551        Anion Gap 13       15       Baso # 0.00     0.00         Basophil % 0.0     0.0         BUN 43       39       Calcium 6.7  Comment: *Critical value notification by __ with confirmation of receipt to  paul velasquez rn___ at  Date_02/09___Time_11:17am___         6.6  Comment: *Critical value notification by   with confirmation of receipt to  Simba Guan RN  at  [id number]:20         Ionized Calcium       0.83       Chloride 104       109       CO2 23       15       Creatinine 2.9       3.2       Differential Method Automated     Automated         eGFR 22.8       20.3       Eos # 0.0     0.0         Eosinophil % 0.0     0.0         Glucose 120       137       Gran # (ANC) 3.8     3.4         Gran % 76.6     80.3         Group & Rh B  POS   B POS           Hematocrit 26.7     21.5         Hemoglobin 8.2     6.7         Immature Grans (Abs) 0.10  Comment: Mild elevation in immature granulocytes is non specific and   can be seen in a variety of conditions including stress response,   acute inflammation, trauma and pregnancy. Correlation with other   laboratory and clinical findings is essential.       0.12  Comment: Mild elevation in immature granulocytes is non specific and   can be seen in a variety of conditions including stress response,   acute inflammation, trauma and pregnancy. Correlation with other   laboratory and clinical findings is essential.           Immature Granulocytes 2.0     2.8         INDIRECT HONORIO   NEG           Lymph # 0.6     0.4         Lymph % 11.2     8.2         MCH 23.8     23.9         MCHC 30.7     31.2         MCV 77     77         Mono # 0.5     0.4         Mono % 10.2     8.7         MPV 11.2     12.0         nRBC 0     0         Platelets 170     138         Potassium 3.9       4.6       RBC 3.45     2.80         RDW 15.3     15.2         Sodium 140       139       Tacrolimus Lvl     6.4  Comment: Testing performed by a chemiluminescent microparticle   immunoassay on the [employer] System.    CAUTION: No firm therapeutic range exists for tacrolimus in whole   blood. The   complexity of the clinical state, individual differences in   sensitivity to   immunosuppressive and nephrotoxic effects of tacrolimus,   co-administration   of other immunosuppressants, type of transplant, time post-transplant   and a   number of other factors contribute to different requirements for   optimal   blood levels of tacrolimus. Therefore, individual tacrolimus values   cannot   be used as the sole indicator for making changes in treatment regimen   and   each patient should be thoroughly evaluated clinically before changes   in   treatment regimens are made. Each user must establish his or her own   ranges   based on  clinical experience.  Therapeutic ranges vary according to the commercial test used, and   therefore   should be established for each commercial test. Values obtained with   different assay methods cannot be used interchangeably due to   differences in   assay methods and cross-reactivity with metabolites, nor should   correction   factors be applied. Therefore, consistent use of one assay for   individual   patients is recommended.           WBC 4.99     4.25                 Significant Imaging: I have reviewed all pertinent imaging results/findings within the past 24 hours.    Renal US with doppler 2/8/23:  Impression:       Elevated arterial resistive indices, overall similar to prior, nonspecific finding which may reflect rejection, drug toxicity, medical renal disease.     Otherwise, satisfactory gray scale and doppler evaluation of renal allograft.        Assessment/Plan:      * Acute renal failure superimposed on stage 4 chronic kidney disease  Hyperkalemia, resolved   Metabolic acidosis, resolved     Cr 3.5 on admission, baseline ~2.2-2.7 per nephrology; improving  Potentially pre-renal from dehydration and volume losses in setting of COVID and diarrhea at home  Appreciate KTM assistance;  Discussed with transplant neph and bicarb dc'd in favor of NS. IV lasix discontinued.  Renal US reviewed  HyperK+ and metabolic acidosis resolved with diuresis and bicarb.  Trend metabolic panel  Strict I&Os  Avoid Nephrotoxic agents       COVID-19  DYSPNEA ON EXERTION    - Patient tested positive for covid 1/12/23 and tested positive again on admission 2/7/23. Unclear if this is new infection or residual positive from previous infection.    -DAUGHERTY possibly long COVID syndrome  -Initiate standard COVID protocols and isolation. Cont to intermittently monitor inflammatory markers. Cont Remdesivir. Decadron initially started but has been discontinued as pt remains on room air. Home prednisone started. Bronchodilators as needed  for SOB.   - CXR reviewed - clear   -repeat echo pending  -six min walk to assess ambulatory o2 sats pending.      Sinus congestion  -CT sinus pending  -cont Flonase/antihistamines      Hypocalcemia  -Per KTM, likely due to historical parathyroidectomy  -Increase paricalcitol and calcium carbonate as per KTM  -Resume home doses when he is discharged for Dr. Latif to manage    Anemia due to stage 4 chronic kidney disease  -Patient's Hb is currently stable  -Monitor serial CBC and transfuse if patient becomes hemodynamically unstable, symptomatic or H/H drops below 7/21.   -No signs to suggest acute GI bleed.        Kidney replaced by transplant  Immunosuppression therapy     - KTM consulted, appreciate assistance   - continue home tacrolimus and pred; hold MMF  - check tacro level daily      Chronic diastolic heart failure  -  (baseline). No evidence of volume overload on exam. CXR clear.   - last echo reviewed, repeat echo pending    Results for orders placed during the hospital encounter of 03/21/22    Echo    Interpretation Summary  · Moderate left atrial enlargement.  · The left ventricle is normal in size with concentric remodeling and normal systolic function.  · The estimated ejection fraction is 55%.  · There are segmental left ventricular wall motion abnormalities : akinetic basal inferior wall.  · Indeterminate left ventricular diastolic function.  · Mild right atrial enlargement.  · Normal right ventricular size with normal right ventricular systolic function.  · There is mild aortic valve stenosis.  · Aortic valve area is 1.84 cm2; peak velocity is 2.60 m/s; mean gradient is 13 mmHg.  · Mild aortic regurgitation.  · Mild pulmonic regurgitation.  · The estimated PA systolic pressure is 25 mmHg.  · Normal central venous pressure (3 mmHg).        Paroxysmal atrial fibrillation  Long term AC     -Patient with paroxysmal atrial fibrillation which is controlled currently with Beta Blocker and  flecainide. S/P ablation in 2022. Patient is currently in sinus rhythm.[id number] Score: 3.  Anticoagulation with Eilquis  - tele reviewed with no dysrhythmias reported. Tele discontinued at pt request.  -Resume tele if pt experiences tachyc, palpitations, irreg rhythm    Obesity (BMI 30-39.9)  Body mass index is 31.27 kg/m². Morbid obesity complicates all aspects of disease management from diagnostic modalities to treatment. Weight loss encouraged and health benefits explained to patient.         Diarrhea  -Diarrhea resolved since admission  -stool studies pending    Mixed hyperlipidemia  - continue statin    CAD (coronary artery disease)  - continue daily asa, statin  - trop 0.062 (baseline); no c/o chest pain   - EKG without concern for ischemia       Hypertension  - BP stable   - continue toprol, nifedipine,   -Will continue to monitor blood pressure trend closely and adjust antihypertensive regimen as clinically indicated and tolerated.        Long-term use of immunosuppressant medication  -holding MMF per KTM with pos COVID.  -Cont home tac and home prednisone        VTE Risk Mitigation (From admission, onward)           Ordered     apixaban tablet 5 mg  2 times daily         02/07/23 1505     IP VTE HIGH RISK PATIENT  Once         02/07/23 1505     Place sequential compression device  Until discontinued         02/07/23 1505     Place RASHAD hose  Until discontinued         02/07/23 1505                    Discharge Planning   RAMU: 2/10/2023     Code Status: Full Code   Is the patient medically ready for discharge?: No    Reason for patient still in hospital (select all that apply): Patient trending condition  Discharge Plan A: Home, Home with family, Other (referred for OPCM)        ADDENDUM:    Right hand tremor, chronic  -seems worse with movement, after exertion and since covid diagnosis  -Limit caffeine and meds that could worsen. Pt is on chronic prednisone s/p kidney tranplant.  -follow up 6 min  walk  -currently on metoprolol for par AFib, could consider propranolol. Will defer to PCP/cardiology.  -Avoid primidone due to interaction with lukas Luna III, MD  Department of Hospital Medicine   [facility] - Intensive Care ([place]-)

## 2023-02-09 NOTE — ASSESSMENT & PLAN NOTE
DYSPNEA ON EXERTION    - Patient tested positive for covid 1/12/23 and tested positive again on admission 2/7/23. Unclear if this is new infection or residual positive from previous infection.    -DAUGHERTY possibly long COVID syndrome  -Initiate standard COVID protocols and isolation. Cont to intermittently monitor inflammatory markers. Cont Remdesivir. Decadron initially started but has been discontinued as pt remains on room air. Home prednisone started. Bronchodilators as needed for SOB.   - CXR reviewed - clear   -repeat echo pending  -six min walk to assess ambulatory o2 sats pending.

## 2023-02-09 NOTE — PLAN OF CARE
Problem: Adult Inpatient Plan of Care  Goal: Plan of Care Review  Outcome: Ongoing, Progressing  Goal: Patient-Specific Goal (Individualized)  Outcome: Ongoing, Progressing  Goal: Absence of Hospital-Acquired Illness or Injury  Outcome: Ongoing, Progressing  Goal: Optimal Comfort and Wellbeing  Outcome: Ongoing, Progressing  Goal: Readiness for Transition of Care  Outcome: Ongoing, Progressing     Problem: Skin Injury Risk Increased  Goal: Skin Health and Integrity  Outcome: Ongoing, Progressing     Problem: Fluid and Electrolyte Imbalance (Acute Kidney Injury/Impairment)  Goal: Fluid and Electrolyte Balance  Outcome: Ongoing, Progressing    Pt aaox4. Remains on isolation for Covid. Continues with sodium bicarb infusion. No c/o pain/discomfort. He remains free of any falls/injuries. Pt turns and ambulates independently. Safety measures maintained.

## 2023-02-09 NOTE — ASSESSMENT & PLAN NOTE
-Per KTM, likely due to historical parathyroidectomy  -Increase paricalcitol and calcium carbonate as per KTM  -Resume home doses when he is discharged for Dr. Latif to manage

## 2023-02-09 NOTE — ASSESSMENT & PLAN NOTE
Immunosuppression therapy     - KTM consulted, appreciate assistance   - continue home tacrolimus and pred; hold MMF  - check tacro level daily

## 2023-02-09 NOTE — ASSESSMENT & PLAN NOTE
Hyperkalemia, resolved   Metabolic acidosis, resolved     Cr 3.5 on admission, baseline ~2.2-2.7 per nephrology; improving  · Potentially pre-renal from dehydration and volume losses in setting of COVID and diarrhea at home  · Appreciate KTM assistance;  Discussed with transplant neph and bicarb dc'd in favor of NS. IV lasix discontinued.  · Renal US reviewed  · HyperK+ and metabolic acidosis resolved with diuresis and bicarb.  · Trend metabolic panel  · Strict I&Os  · Avoid Nephrotoxic agents

## 2023-02-09 NOTE — ASSESSMENT & PLAN NOTE
Long term AC     -Patient with paroxysmal atrial fibrillation which is controlled currently with Beta Blocker and flecainide. S/P ablation in 2022. Patient is currently in sinus rhythm.[id number] Score: 3.  Anticoagulation with Eilquis  - tele reviewed with no dysrhythmias reported. Tele discontinued at pt request.  -Resume tele if pt experiences tachyc, palpitations, irreg rhythm

## 2023-02-10 ENCOUNTER — PATIENT MESSAGE (OUTPATIENT)
Dept: ADMINISTRATIVE | Facility: CLINIC | Age: 68
End: 2023-02-10
Payer: MEDICARE

## 2023-02-10 VITALS
HEIGHT: 73 IN | RESPIRATION RATE: 18 BRPM | OXYGEN SATURATION: 96 % | HEART RATE: 73 BPM | SYSTOLIC BLOOD PRESSURE: 121 MMHG | BODY MASS INDEX: 29.69 KG/M2 | WEIGHT: 224 LBS | TEMPERATURE: 98 F | DIASTOLIC BLOOD PRESSURE: 69 MMHG

## 2023-02-10 PROBLEM — N17.9 ACUTE RENAL FAILURE SUPERIMPOSED ON STAGE 4 CHRONIC KIDNEY DISEASE: Status: RESOLVED | Noted: 2022-01-31 | Resolved: 2023-02-10

## 2023-02-10 PROBLEM — E87.20 METABOLIC ACIDOSIS: Status: RESOLVED | Noted: 2023-02-08 | Resolved: 2023-02-10

## 2023-02-10 PROBLEM — J01.90 ACUTE SINUSITIS: Status: ACTIVE | Noted: 2023-02-09

## 2023-02-10 PROBLEM — I27.20 PULMONARY HTN: Status: ACTIVE | Noted: 2023-02-10

## 2023-02-10 PROBLEM — N18.4 ACUTE RENAL FAILURE SUPERIMPOSED ON STAGE 4 CHRONIC KIDNEY DISEASE: Status: RESOLVED | Noted: 2022-01-31 | Resolved: 2023-02-10

## 2023-02-10 LAB
ANION GAP SERPL CALC-SCNC: 12 MMOL/L (ref 8–16)
ASCENDING AORTA: 3.44 CM
AV INDEX (PROSTH): 0.44
AV MEAN GRADIENT: 15 MMHG
AV PEAK GRADIENT: 33 MMHG
AV VALVE AREA: 1.84 CM2
AV VELOCITY RATIO: 0.38
BASOPHILS # BLD AUTO: 0.02 K/UL (ref 0–0.2)
BASOPHILS NFR BLD: 0.3 % (ref 0–1.9)
BSA FOR ECHO PROCEDURE: 2.29 M2
BUN SERPL-MCNC: 49 MG/DL (ref 8–23)
CALCIUM SERPL-MCNC: 6.5 MG/DL (ref 8.7–10.5)
CHLORIDE SERPL-SCNC: 106 MMOL/L (ref 95–110)
CO2 SERPL-SCNC: 20 MMOL/L (ref 23–29)
CREAT SERPL-MCNC: 2.6 MG/DL (ref 0.5–1.4)
CV ECHO LV RWT: 0.4 CM
DIFFERENTIAL METHOD: ABNORMAL
DOP CALC AO PEAK VEL: 2.87 M/S
DOP CALC AO VTI: 61.34 CM
DOP CALC LVOT AREA: 4.2 CM2
DOP CALC LVOT DIAMETER: 2.3 CM
DOP CALC LVOT PEAK VEL: 1.08 M/S
DOP CALC LVOT STROKE VOLUME: 113.12 CM3
DOP CALCLVOT PEAK VEL VTI: 27.24 CM
E COLI SXT1 STL QL IA: NEGATIVE
E COLI SXT2 STL QL IA: NEGATIVE
E WAVE DECELERATION TIME: 166.8 MSEC
E/A RATIO: 0.97
E/E' RATIO: 13.29 M/S
ECHO LV POSTERIOR WALL: 1.1 CM (ref 0.6–1.1)
EJECTION FRACTION: 65 %
EOSINOPHIL # BLD AUTO: 0 K/UL (ref 0–0.5)
EOSINOPHIL NFR BLD: 0.5 % (ref 0–8)
ERYTHROCYTE [DISTWIDTH] IN BLOOD BY AUTOMATED COUNT: 15.3 % (ref 11.5–14.5)
EST. GFR  (NO RACE VARIABLE): 26 ML/MIN/1.73 M^2
FRACTIONAL SHORTENING: 32 % (ref 28–44)
GLUCOSE SERPL-MCNC: 94 MG/DL (ref 70–110)
HCT VFR BLD AUTO: 27 % (ref 40–54)
HGB BLD-MCNC: 8.2 G/DL (ref 14–18)
IMM GRANULOCYTES # BLD AUTO: 0.21 K/UL (ref 0–0.04)
IMM GRANULOCYTES NFR BLD AUTO: 3.3 % (ref 0–0.5)
INTERVENTRICULAR SEPTUM: 1.07 CM (ref 0.6–1.1)
IRON SERPL-MCNC: 54 UG/DL (ref 45–160)
LA MAJOR: 6.67 CM
LA MINOR: 6.84 CM
LA WIDTH: 4.68 CM
LEFT ATRIUM SIZE: 4.27 CM
LEFT ATRIUM VOLUME INDEX MOD: 49.8 ML/M2
LEFT ATRIUM VOLUME INDEX: 50.8 ML/M2
LEFT ATRIUM VOLUME MOD: 112.61 CM3
LEFT ATRIUM VOLUME: 114.72 CM3
LEFT INTERNAL DIMENSION IN SYSTOLE: 3.79 CM (ref 2.1–4)
LEFT VENTRICLE DIASTOLIC VOLUME INDEX: 66.62 ML/M2
LEFT VENTRICLE DIASTOLIC VOLUME: 150.56 ML
LEFT VENTRICLE MASS INDEX: 107 G/M2
LEFT VENTRICLE SYSTOLIC VOLUME INDEX: 27.2 ML/M2
LEFT VENTRICLE SYSTOLIC VOLUME: 61.41 ML
LEFT VENTRICULAR INTERNAL DIMENSION IN DIASTOLE: 5.55 CM (ref 3.5–6)
LEFT VENTRICULAR MASS: 241.17 G
LV LATERAL E/E' RATIO: 11.63 M/S
LV SEPTAL E/E' RATIO: 15.5 M/S
LYMPHOCYTES # BLD AUTO: 0.8 K/UL (ref 1–4.8)
LYMPHOCYTES NFR BLD: 12.1 % (ref 18–48)
MAGNESIUM SERPL-MCNC: 1.8 MG/DL (ref 1.6–2.6)
MCH RBC QN AUTO: 23.8 PG (ref 27–31)
MCHC RBC AUTO-ENTMCNC: 30.4 G/DL (ref 32–36)
MCV RBC AUTO: 79 FL (ref 82–98)
MONOCYTES # BLD AUTO: 0.6 K/UL (ref 0.3–1)
MONOCYTES NFR BLD: 10.2 % (ref 4–15)
MV A" WAVE DURATION": 13.32 MSEC
MV PEAK A VEL: 0.96 M/S
MV PEAK E VEL: 0.93 M/S
MV STENOSIS PRESSURE HALF TIME: 48.37 MS
MV VALVE AREA P 1/2 METHOD: 4.55 CM2
NEUTROPHILS # BLD AUTO: 4.6 K/UL (ref 1.8–7.7)
NEUTROPHILS NFR BLD: 73.6 % (ref 38–73)
NRBC BLD-RTO: 0 /100 WBC
PISA MRMAX VEL: 0.06 M/S
PISA TR MAX VEL: 3.2 M/S
PLATELET # BLD AUTO: 166 K/UL (ref 150–450)
PMV BLD AUTO: 11.4 FL (ref 9.2–12.9)
POTASSIUM SERPL-SCNC: 4.1 MMOL/L (ref 3.5–5.1)
PULM VEIN S/D RATIO: 1.11
PV PEAK D VEL: 0.47 M/S
PV PEAK S VEL: 0.52 M/S
RA MAJOR: 6.49 CM
RA WIDTH: 4.05 CM
RBC # BLD AUTO: 3.44 M/UL (ref 4.6–6.2)
RIGHT VENTRICULAR END-DIASTOLIC DIMENSION: 4.07 CM
RV AG STL QL IA.RAPID: NEGATIVE
SATURATED IRON: 38 % (ref 20–50)
SINUS: 3.56 CM
SODIUM SERPL-SCNC: 138 MMOL/L (ref 136–145)
STJ: 2.93 CM
TACROLIMUS BLD-MCNC: 5.9 NG/ML (ref 5–15)
TDI LATERAL: 0.08 M/S
TDI SEPTAL: 0.06 M/S
TDI: 0.07 M/S
TOTAL IRON BINDING CAPACITY: 142 UG/DL (ref 250–450)
TR MAX PG: 41 MMHG
TRANSFERRIN SERPL-MCNC: 96 MG/DL (ref 200–375)
TRICUSPID ANNULAR PLANE SYSTOLIC EXCURSION: 2.31 CM
WBC # BLD AUTO: 6.29 K/UL (ref 3.9–12.7)

## 2023-02-10 PROCEDURE — 94640 AIRWAY INHALATION TREATMENT: CPT

## 2023-02-10 PROCEDURE — 94761 N-INVAS EAR/PLS OXIMETRY MLT: CPT

## 2023-02-10 PROCEDURE — 99900035 HC TECH TIME PER 15 MIN (STAT)

## 2023-02-10 PROCEDURE — 84466 ASSAY OF TRANSFERRIN: CPT | Performed by: FAMILY MEDICINE

## 2023-02-10 PROCEDURE — 83735 ASSAY OF MAGNESIUM: CPT | Performed by: FAMILY MEDICINE

## 2023-02-10 PROCEDURE — 85025 COMPLETE CBC W/AUTO DIFF WBC: CPT

## 2023-02-10 PROCEDURE — 99233 PR SUBSEQUENT HOSPITAL CARE,LEVL III: ICD-10-PCS | Mod: ,,, | Performed by: INTERNAL MEDICINE

## 2023-02-10 PROCEDURE — 99233 SBSQ HOSP IP/OBS HIGH 50: CPT | Mod: CR,,, | Performed by: FAMILY MEDICINE

## 2023-02-10 PROCEDURE — 94664 DEMO&/EVAL PT USE INHALER: CPT

## 2023-02-10 PROCEDURE — 25000003 PHARM REV CODE 250: Performed by: STUDENT IN AN ORGANIZED HEALTH CARE EDUCATION/TRAINING PROGRAM

## 2023-02-10 PROCEDURE — 63600175 PHARM REV CODE 636 W HCPCS: Performed by: FAMILY MEDICINE

## 2023-02-10 PROCEDURE — 25000003 PHARM REV CODE 250

## 2023-02-10 PROCEDURE — 99233 PR SUBSEQUENT HOSPITAL CARE,LEVL III: ICD-10-PCS | Mod: CR,,, | Performed by: FAMILY MEDICINE

## 2023-02-10 PROCEDURE — 80197 ASSAY OF TACROLIMUS: CPT | Performed by: STUDENT IN AN ORGANIZED HEALTH CARE EDUCATION/TRAINING PROGRAM

## 2023-02-10 PROCEDURE — 99233 SBSQ HOSP IP/OBS HIGH 50: CPT | Mod: ,,, | Performed by: INTERNAL MEDICINE

## 2023-02-10 PROCEDURE — 80048 BASIC METABOLIC PNL TOTAL CA: CPT | Performed by: FAMILY MEDICINE

## 2023-02-10 PROCEDURE — 94799 UNLISTED PULMONARY SVC/PX: CPT

## 2023-02-10 PROCEDURE — 63600175 PHARM REV CODE 636 W HCPCS

## 2023-02-10 PROCEDURE — 36415 COLL VENOUS BLD VENIPUNCTURE: CPT | Performed by: FAMILY MEDICINE

## 2023-02-10 PROCEDURE — 63600175 PHARM REV CODE 636 W HCPCS: Mod: TB | Performed by: STUDENT IN AN ORGANIZED HEALTH CARE EDUCATION/TRAINING PROGRAM

## 2023-02-10 PROCEDURE — 25000003 PHARM REV CODE 250: Performed by: FAMILY MEDICINE

## 2023-02-10 RX ORDER — AMOXICILLIN AND CLAVULANATE POTASSIUM 875; 125 MG/1; MG/1
1 TABLET, FILM COATED ORAL EVERY 12 HOURS
Status: DISCONTINUED | OUTPATIENT
Start: 2023-02-10 | End: 2023-02-10 | Stop reason: HOSPADM

## 2023-02-10 RX ORDER — AMOXICILLIN AND CLAVULANATE POTASSIUM 875; 125 MG/1; MG/1
1 TABLET, FILM COATED ORAL EVERY 12 HOURS
Qty: 13 TABLET | Refills: 0 | Status: SHIPPED | OUTPATIENT
Start: 2023-02-10 | End: 2023-02-17

## 2023-02-10 RX ORDER — FUROSEMIDE 40 MG/1
20 TABLET ORAL DAILY PRN
Qty: 90 TABLET | Refills: 0 | Status: ON HOLD | OUTPATIENT
Start: 2023-02-10 | End: 2023-03-06 | Stop reason: HOSPADM

## 2023-02-10 RX ORDER — HYDRALAZINE HYDROCHLORIDE 25 MG/1
50 TABLET, FILM COATED ORAL EVERY 8 HOURS
Status: DISCONTINUED | OUTPATIENT
Start: 2023-02-10 | End: 2023-02-10 | Stop reason: HOSPADM

## 2023-02-10 RX ORDER — POTASSIUM CHLORIDE 750 MG/1
10 TABLET, EXTENDED RELEASE ORAL DAILY PRN
Qty: 90 TABLET | Refills: 0 | Status: ON HOLD | OUTPATIENT
Start: 2023-02-10 | End: 2023-03-06 | Stop reason: HOSPADM

## 2023-02-10 RX ADMIN — CETIRIZINE HYDROCHLORIDE 5 MG: 10 TABLET, FILM COATED ORAL at 08:02

## 2023-02-10 RX ADMIN — PREDNISONE 5 MG: 5 TABLET ORAL at 08:02

## 2023-02-10 RX ADMIN — FLECAINIDE ACETATE 50 MG: 50 TABLET ORAL at 08:02

## 2023-02-10 RX ADMIN — PARICALCITOL 1 MCG: 1 CAPSULE, LIQUID FILLED ORAL at 08:02

## 2023-02-10 RX ADMIN — FAMOTIDINE 20 MG: 20 TABLET ORAL at 08:02

## 2023-02-10 RX ADMIN — CALCIUM CARBONATE (ANTACID) CHEW TAB 500 MG 1000 MG: 500 CHEW TAB at 08:02

## 2023-02-10 RX ADMIN — ALBUTEROL SULFATE 2 PUFF: 108 INHALANT RESPIRATORY (INHALATION) at 09:02

## 2023-02-10 RX ADMIN — ATORVASTATIN CALCIUM 10 MG: 10 TABLET, FILM COATED ORAL at 08:02

## 2023-02-10 RX ADMIN — AMOXICILLIN AND CLAVULANATE POTASSIUM 1 TABLET: 875; 125 TABLET, FILM COATED ORAL at 08:02

## 2023-02-10 RX ADMIN — SODIUM CHLORIDE: 9 INJECTION, SOLUTION INTRAVENOUS at 03:02

## 2023-02-10 RX ADMIN — REMDESIVIR 100 MG: 100 INJECTION, POWDER, LYOPHILIZED, FOR SOLUTION INTRAVENOUS at 09:02

## 2023-02-10 RX ADMIN — APIXABAN 5 MG: 5 TABLET, FILM COATED ORAL at 08:02

## 2023-02-10 RX ADMIN — DOXAZOSIN 4 MG: 2 TABLET ORAL at 10:02

## 2023-02-10 RX ADMIN — TACROLIMUS 2 MG: 1 CAPSULE ORAL at 08:02

## 2023-02-10 RX ADMIN — HYDRALAZINE HYDROCHLORIDE 50 MG: 25 TABLET, FILM COATED ORAL at 09:02

## 2023-02-10 RX ADMIN — GABAPENTIN 300 MG: 300 CAPSULE ORAL at 08:02

## 2023-02-10 RX ADMIN — FLUTICASONE PROPIONATE 50 MCG: 50 SPRAY, METERED NASAL at 09:02

## 2023-02-10 RX ADMIN — Medication 500 MG: at 08:02

## 2023-02-10 RX ADMIN — FLUTICASONE FUROATE AND VILANTEROL TRIFENATATE 1 PUFF: 100; 25 POWDER RESPIRATORY (INHALATION) at 09:02

## 2023-02-10 RX ADMIN — ASPIRIN 81 MG: 81 TABLET, COATED ORAL at 08:02

## 2023-02-10 RX ADMIN — MUPIROCIN: 20 OINTMENT TOPICAL at 09:02

## 2023-02-10 RX ADMIN — NIFEDIPINE 60 MG: 30 TABLET, FILM COATED, EXTENDED RELEASE ORAL at 08:02

## 2023-02-10 RX ADMIN — POLYETHYLENE GLYCOL 3350 17 G: 17 POWDER, FOR SOLUTION ORAL at 08:02

## 2023-02-10 RX ADMIN — THERA TABS 1 TABLET: TAB at 08:02

## 2023-02-10 RX ADMIN — METOPROLOL SUCCINATE 25 MG: 25 TABLET, EXTENDED RELEASE ORAL at 08:02

## 2023-02-10 RX ADMIN — ALBUTEROL SULFATE 2 PUFF: 108 INHALANT RESPIRATORY (INHALATION) at 01:02

## 2023-02-10 NOTE — ASSESSMENT & PLAN NOTE
-  (baseline). No evidence of volume overload on exam. CXR clear.   - last echo reviewed, repeat echo as in hospital course    Results for orders placed during the hospital encounter of 03/21/22    Echo    Interpretation Summary  · Moderate left atrial enlargement.  · The left ventricle is normal in size with concentric remodeling and normal systolic function.  · The estimated ejection fraction is 55%.  · There are segmental left ventricular wall motion abnormalities : akinetic basal inferior wall.  · Indeterminate left ventricular diastolic function.  · Mild right atrial enlargement.  · Normal right ventricular size with normal right ventricular systolic function.  · There is mild aortic valve stenosis.  · Aortic valve area is 1.84 cm2; peak velocity is 2.60 m/s; mean gradient is 13 mmHg.  · Mild aortic regurgitation.  · Mild pulmonic regurgitation.  · The estimated PA systolic pressure is 25 mmHg.  · Normal central venous pressure (3 mmHg).

## 2023-02-10 NOTE — ASSESSMENT & PLAN NOTE
CT scan and endoscopy suggestive of sinusitis  On augmentin and will follow up with ENT  He is to hold his MMF while on abx  Discussed he should notify us or his nephrologist for further plan if he is to remain on ABX long term

## 2023-02-10 NOTE — PLAN OF CARE
SSC met with patient/family at bedside. Patient experience rounding completed and reviewed the following.     Do you know your discharge plan? Yes  If yes, what is the plan? (Home, w/spouse)    If you are discharging home, do you have help at home? Yes spouse  Do you think you will need help at home at discharge? No     Have you discussed your needs and preferences with your SW/CM? Yes     Assigned SW/CM notified of any patient/family needs or concerns. No needs/concerns

## 2023-02-10 NOTE — SUBJECTIVE & OBJECTIVE
Subjective:   History of Present Illness:  67 year old male with KT 2005 who presented to the ED from home after not having improvement of SOB, diarrhea from diagnosis of covid 1 month ago. Reports visiting our ED and testing positive for covid, but being stable enough to go home. No interventions were provided at that time. He remained on all immunosuppressants. After going home he did not improve and did home testing kits were negative. He had periodic every other day large volume diarrhea, poor appetite, fatigue, SBOE. Fevers after initial diagnosis resolved relatively quickly. Also reports worsening tremor.   Denies syncope, chest pain, abdominal pain, nausea, vomiting, changes in urination, hematuria, hematochezia, melena      Mr. Phelps is a 67 y.o. year old male who is status post Kidney Transplant - 4/12/2005 - Not Followed  (#2).    His maintenance immunosuppression consists of:   Immunosuppressants (From admission, onward)      Start     Stop Route Frequency Ordered    02/07/23 1800  tacrolimus capsule 2 mg         -- Oral 2 times daily 02/07/23 1505            Hospital Course:  No notes on file    Interval History:  Patient says he feels better. Had endoscopy last night and CT scan showing sinusitis. Now on augmentin.     Past Medical, Surgical, Family, and Social History:   Unchanged from H&P.    Scheduled Meds:   albuterol  2 puff Inhalation Q6H WAKE    amoxicillin-clavulanate 875-125mg  1 tablet Oral Q12H    apixaban  5 mg Oral BID    ascorbic acid (vitamin C)  500 mg Oral BID    aspirin  81 mg Oral Daily    atorvastatin  10 mg Oral Daily    calcium carbonate  1,000 mg Oral BID    cetirizine  5 mg Oral Daily    doxazosin  4 mg Oral Q12H    famotidine  20 mg Oral Daily    flecainide  50 mg Oral Q12H    fluticasone furoate-vilanteroL  1 puff Inhalation Daily    fluticasone propionate  1 spray Each Nostril Daily    gabapentin  300 mg Oral BID    hydrALAZINE  50 mg Oral Q8H    metoprolol succinate  25 mg  "Oral Daily    multivitamin  1 tablet Oral Daily    mupirocin   Nasal BID    NIFEdipine  60 mg Oral BID    paricalcitoL  1 mcg Oral Daily    polyethylene glycol  17 g Oral Daily    predniSONE  5 mg Oral Daily    tacrolimus  2 mg Oral BID     Continuous Infusions:   sodium chloride 0.9% 20 mL/hr at 02/10/23 1350     PRN Meds:acetaminophen, benzonatate, calcium carbonate, cyclobenzaprine, dextrose 10%, dextrose 10%, glucagon (human recombinant), glucose, glucose, hyoscyamine, melatonin, prochlorperazine, sodium chloride 0.9%    Intake/Output - Last 3 Shifts         02/08 0700 02/09 0659 02/09 0700  02/10 0659 02/10 0700 02/11 0659    P.O. 120  480    I.V. (mL/kg)  3402.6 (33.5) 761.6 (7.5)    IV Piggyback  241.8 247.8    Total Intake(mL/kg) 120 (1.2) 3644.3 (35.9) 1489.4 (14.7)    Urine (mL/kg/hr) 1400 (0.6) 500 (0.2) 1750 (1.8)    Emesis/NG output   0    Other   0    Stool   0    Total Output [phone]    Net -1280 +3144.3 -260.6           Urine Occurrence 2 x  2 x    Stool Occurrence  1 x 0 x    Emesis Occurrence   0 x             Review of Systems   Constitutional:  Positive for fatigue.   HENT:  Positive for postnasal drip and sinus pressure.    Eyes: Negative.    Respiratory:  Positive for cough and shortness of breath.    Cardiovascular: Negative.    Gastrointestinal:  Positive for diarrhea.   Endocrine: Negative.    Genitourinary: Negative.    Musculoskeletal: Negative.    Skin: Negative.    Neurological:  Positive for tremors and weakness.   Psychiatric/Behavioral: Negative.      Objective:     Vital Signs (Most Recent):  Temp: 97.7 °F (36.5 °C) (02/10/23 1403)  Pulse: 82 (02/10/23 1437)  Resp: 18 (02/10/23 1403)  BP: (!) 111/59 (02/10/23 1437)  SpO2: 96 % (02/10/23 1403)   Vital Signs (24h Range):  Temp:  [97.7 °F (36.5 °C)-98.3 °F (36.8 °C)] 97.7 °F (36.5 °C)  Pulse:  [63-82] 82  Resp:  [18-20] 18  SpO2:  [82 %-98 %] 96 %  BP: (111-174)/(59-79) 111/59     Weight: 101.6 kg (224 lb)  Height: 6' 1" (185.4 " cm)  Body mass index is 29.55 kg/m².    Physical Exam  Vitals and nursing note reviewed.   Constitutional:       General: He is not in acute distress.     Appearance: He is ill-appearing.   Eyes:      General: No scleral icterus.  Cardiovascular:      Rate and Rhythm: Normal rate.   Pulmonary:      Effort: Pulmonary effort is normal. No respiratory distress.   Abdominal:      General: There is no distension.      Palpations: Abdomen is soft.   Musculoskeletal:      Right lower leg: No edema.      Left lower leg: No edema.   Skin:     General: Skin is dry.      Coloration: Skin is not jaundiced.   Neurological:      Mental Status: He is alert.       Laboratory:  Labs within the past 24 hours have been reviewed.    Diagnostic Results:  None   66

## 2023-02-10 NOTE — SUBJECTIVE & OBJECTIVE
Medications:  Continuous Infusions:   sodium chloride 0.9% 100 mL/hr at 02/09/23 1810     Scheduled Meds:   albuterol  2 puff Inhalation Q6H WAKE    apixaban  5 mg Oral BID    ascorbic acid (vitamin C)  500 mg Oral BID    aspirin  81 mg Oral Daily    atorvastatin  10 mg Oral Daily    calcium carbonate  1,000 mg Oral BID    cetirizine  5 mg Oral Daily    doxazosin  4 mg Oral Q12H    famotidine  20 mg Oral Daily    flecainide  50 mg Oral Q12H    fluticasone furoate-vilanteroL  1 puff Inhalation Daily    fluticasone propionate  1 spray Each Nostril Daily    gabapentin  300 mg Oral BID    metoprolol succinate  25 mg Oral Daily    multivitamin  1 tablet Oral Daily    mupirocin   Nasal BID    NIFEdipine  60 mg Oral BID    paricalcitoL  1 mcg Oral Daily    polyethylene glycol  17 g Oral Daily    predniSONE  5 mg Oral Daily    remdesivir infusion  100 mg Intravenous Daily    tacrolimus  2 mg Oral BID     PRN Meds:acetaminophen, benzonatate, calcium carbonate, cyclobenzaprine, dextrose 10%, dextrose 10%, glucagon (human recombinant), glucose, glucose, hyoscyamine, melatonin, prochlorperazine, sodium chloride 0.9%     No current facility-administered medications on file prior to encounter.     Current Outpatient Medications on File Prior to Encounter   Medication Sig    aspirin (ECOTRIN) 81 MG EC tablet Take 1 tablet by mouth Daily.    atorvastatin (LIPITOR) 10 MG tablet Take 10 mg by mouth.    colchicine (MITIGARE) 0.6 mg Cap One po BID prn gout flare up to 3 days    cyclobenzaprine (FLEXERIL) 5 MG tablet Take 5 mg by mouth 2 (two) times daily as needed.    doxazosin (CARDURA) 4 MG tablet TAKE 1 TABLET (4 MG TOTAL) BY MOUTH EVERY 12 (TWELVE) HOURS.    ELIQUIS 5 mg Tab TAKE 1 TABLET BY MOUTH TWICE A DAY    famotidine (PEPCID) 20 MG tablet TAKE 1 TABLET TWICE DAILY    flecainide (TAMBOCOR) 50 MG Tab Take 1 tablet (50 mg total) by mouth every 12 (twelve) hours.    furosemide (LASIX) 40 MG tablet TAKE 1/2 TABLET TWICE DAILY     gabapentin (NEURONTIN) 300 MG capsule TAKE 1 CAPSULE BY MOUTH TWICE A DAY    hydrALAZINE (APRESOLINE) 50 MG tablet TAKE 1 TABLET (50 MG TOTAL) BY MOUTH 3 (THREE) TIMES DAILY.    hyoscyamine (LEVSIN/SL) 0.125 mg Subl Place 1 tablet (0.125 mg total) under the tongue every 4 (four) hours as needed.    metoprolol succinate (TOPROL-XL) 25 MG 24 hr tablet Take 1 tablet (25 mg total) by mouth once daily.    multivitamin (THERAGRAN) per tablet Take 1 tablet by mouth Daily.    mycophenolate (CELLCEPT) 250 mg Cap Take 2 capsules (500 mg total) by mouth 2 (two) times daily.    NIFEdipine (PROCARDIA-XL) 60 MG (OSM) 24 hr tablet Take 1 tablet (60 mg total) by mouth 2 (two) times a day.    paricalcitoL (ZEMPLAR) 1 MCG capsule TAKE 1 CAPSULE ON MONDAY, WEDNESDAY AND FRIDAY    predniSONE (DELTASONE) 5 MG tablet TAKE 1 TABLET BY MOUTH EVERY DAY IN THE MORNING    spironolactone (ALDACTONE) 25 MG tablet TAKE 1 TABLET EVERY DAY    tacrolimus (PROGRAF) 0.5 MG Cap Take by mouth.    acetaminophen (TYLENOL) 500 MG tablet Take 1 tablet (500 mg total) by mouth every 6 (six) hours as needed for Pain.    albuterol (PROVENTIL) 2.5 mg /3 mL (0.083 %) nebulizer solution Take 3 mLs (2.5 mg total) by nebulization every 6 (six) hours as needed for Wheezing. Rescue    albuterol (VENTOLIN HFA) 90 mcg/actuation inhaler Inhale 2 puffs into the lungs every 6 (six) hours as needed for Wheezing or Shortness of Breath. Rescue    ammonium lactate 12 % Crea Apply 1 g topically once daily.    calcium carbonate (CALCIUM 600 ORAL) Take by mouth. Pt taking 1200 daily    clonazePAM (KLONOPIN) 0.5 MG tablet Take 1 tablet (0.5 mg total) by mouth 2 (two) times daily as needed for Anxiety. (Patient not taking: Reported on 1/5/2023)    fexofenadine (ALLEGRA) 60 MG tablet Take 1 tablet by mouth Twice daily as needed.    FLUAD QUAD 2022-23,65Y UP,,PF, 60 mcg (15 mcg x 4)/0.5 mL Syrg     fluticasone propion-salmeterol 115-21 mcg/dose (ADVAIR HFA) 115-21 mcg/actuation HFAA  inhaler Inhale 2 puffs into the lungs every 12 (twelve) hours. Controller    fluticasone propionate (FLONASE) 50 mcg/actuation nasal spray 1 spray (50 mcg total) by Each Nostril route once daily.    KLOR-CON 10 10 mEq TbSR TAKE 1 TABLET BY MOUTH EVERY DAY    LIDOcaine (LIDODERM) 5 % Place 1 patch onto the skin daily as needed (back pain). Remove & Discard patch within 12 hours or as directed by MD    omeprazole (PRILOSEC) 20 MG capsule Take 1 capsule (20 mg total) by mouth once daily.    PFIZER COVID BIVAL,12Y UP,,PF, 30 mcg/0.3 mL injection     tacrolimus (PROGRAF) 1 MG Cap Two po in am and two po in pm    vitamin D 1000 units Tab Take 370 mg by mouth 2 (two) times daily. 2 tablets BID       Review of patient's allergies indicates:   Allergen Reactions    Ace inhibitors Swelling    Verapamil Other (See Comments)     Other reaction(s): Unknown    Povidone-iodine Itching       Past Medical History:   Diagnosis Date    (HFpEF) heart failure with preserved ejection fraction 9/25/2017    Atrial fibrillation     CAD (coronary artery disease)     CHF (congestive heart failure)     Chronic diastolic heart failure 4/8/2020    CKD (chronic kidney disease) stage 3, GFR 30-59 ml/min     Dr. Latif    CKD (chronic kidney disease) stage 3, GFR 30-59 ml/min     Diverticulosis     Fever blister     Heart attack 2006    Hyperlipidemia     Hypertension     Immunodeficiency due to treatment with immunosuppressive medication     Keloid cicatrix     Left lower quadrant abdominal pain 6/5/2022    Metabolic bone disease     Pericarditis     S/P kidney transplant     Supraventricular tachycardia 06/2019    Thrombocytopenia     Thyroid disease     Tobacco use 9/5/2014    Unspecified disorder of kidney and ureter     Stage IIICKD     Past Surgical History:   Procedure Laterality Date    CARDIOVERSION  04/30/15    CHOLECYSTECTOMY      COLONOSCOPY  April 20, 2011    Diverticulosis, repeat recommended in 3 yrs., repeat colonoscopy 2014  "revealed 2 polyps.  He should return in 5 years.    COLONOSCOPY N/A 3/13/2020    Procedure: COLONOSCOPY;  Surgeon: Chon Casper MD;  [facility]  Service: Endoscopy;  Laterality: N/A;  ok to hold coumadin x5days-see telephone encounter 20-tb    COLONOSCOPY N/A 2021    Procedure: COLONOSCOPY;  Surgeon: Chon Casper MD;  [facility]  Service: Endoscopy;  Laterality: N/A;  Eliquis - per Dr. GAVIN Blunt ok to hold x 2 days and "restarted asap"- ERW  last prep "poor", [id number] ordered/ Pt requests Dr. Casper  prep ins. mailed - COVID screening on 21 PCW- ERW    COLONOSCOPY N/A 3/3/2021    Procedure: COLONOSCOPY;  Surgeon: Chon Casper MD;  [facility]  Service: Endoscopy;  Laterality: N/A;  Patient with his second poor bowel pre and has poor prep today.  If patient not intersted or can't get colonoscopy tomorrow will need constipation bowel prep and will need to restart his Eliquis today.Thanks,Chon     per Dr Blunt-ok to hold Eliquis 2 days prior      COVID     CORONARY ANGIOPLASTY WITH STENT PLACEMENT  2006    KIDNEY TRANSPLANT  2005    PARATHYROIDECTOMY      TREATMENT OF CARDIAC ARRHYTHMIA N/A 3/28/2022    Procedure: CARDIOVERSION;  Surgeon: Migue Blunt MD;  Location: [facility];  Service: Cardiology;  Laterality: N/A;  AF, DCC, MAC, GP, 3 PREP*RODRIGO deferred pt 100% compliant*     Family History       Problem Relation (Age of Onset)    Heart disease Father    Kidney disease Sister, Brother    Kidney failure Sister, Brother    No Known Problems Sister, Brother, Sister, Sister    Thyroid disease Mother          Tobacco Use    Smoking status: Former     Packs/day: 0.50     Years: 40.00     Pack years: 20.00     Types: Cigarettes     Quit date: 2022     Years since quittin.9    Smokeless tobacco: Never    Tobacco comments:     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     Patient is " currently retired   Substance and Sexual Activity    Alcohol use: No    Drug use: No    Sexual activity: Yes     Partners: Female     Review of Systems  Objective:     Vital Signs (Most Recent):  Temp: 98.3 °F (36.8 °C) (02/09/23 1629)  Pulse: 67 (02/09/23 1629)  Resp: 18 (02/09/23 1629)  BP: 136/67 (02/09/23 1629)  SpO2: 97 % (02/09/23 1629) Vital Signs (24h Range):  Temp:  [97.5 °F (36.4 °C)-98.5 °F (36.9 °C)] 98.3 °F (36.8 °C)  Pulse:  [60-84] 67  Resp:  [16-20] 18  SpO2:  [93 %-97 %] 97 %  BP: (130-150)/(64-70) 136/67     Weight: 101.9 kg (224 lb 10.4 oz)  Body mass index is 29.64 kg/m².    Date 02/09/23 0700 - 02/10/23 0659   Shift 5590-1140 5958-6515 1394-4779 24 Hour Total   INTAKE   I.V.(mL/kg)  2224.7(21.8)  2224.7(21.8)   IV Piggyback  241.8  241.8   Shift Total(mL/kg)  2466.5(24.2)  2466.5(24.2)   OUTPUT   Urine(mL/kg/hr) 500(0.6)   500   Shift Total(mL/kg) 500(4.9)   500(4.9)   Weight (kg) 101.9 101.9 101.9 101.9       Physical Exam  Constitutional:       General: He is not in acute distress.     Appearance: Normal appearance.   HENT:      Head: Normocephalic and atraumatic.      Nose:      Comments: See rhinoscopy  Eyes:      Extraocular Movements: Extraocular movements intact.   Neurological:      Mental Status: He is alert.     Procedure: Flexible laryngoscopy  After explaining the procedure and obtaining verbal consent,the flexible laryngoscope was inserted into the nare and advanced to visualize the nasal cavity bilaterally.    OVERALL FINDINGS  Left- Middle turbinate with healthy appearing tissue. No obvious drainage from middle meatus. No polyps or polypoid changes.   Right- Middle turbinate with healthy appearing tissue. No obvious drainage from middle meatus. No polyps or polypoid changes.     Significant Labs:  CBC:   Recent Labs   Lab 02/09/23  1015   WBC 4.99   RBC 3.45*   HGB 8.2*   HCT 26.7*      MCV 77*   MCH 23.8*   MCHC 30.7*       Significant Diagnostics:  CT: I have reviewed all  pertinent results/findings within the past 24 hours and my personal findings are:  Bilateral maxillary, ethmoid, frontal and sphenoid opacification. Heterogeneity on soft tissue window.

## 2023-02-10 NOTE — ASSESSMENT & PLAN NOTE
DYSPNEA ON EXERTION    - Patient tested positive for covid 1/12/23 and tested positive again on admission 2/7/23. Unclear if this is new infection or residual positive from previous infection.    -DAUGHERTY possibly long COVID syndrome  -Initiated standard COVID protocols and isolation. Cont to intermittently monitor inflammatory markers. Cont Remdesivir. Decadron initially started but has been discontinued as pt remains on room air. Home prednisone started. Bronchodilators as needed for SOB.   - CXR reviewed - clear   -repeat echo reviewed and mentioned in hospital course  -six min walk to assess ambulatory o2 sats neg

## 2023-02-10 NOTE — DISCHARGE INSTRUCTIONS
Seek medical attention for worsening sob on exertion or other developing sx such as chest pain, shortness of breath, palpitations, abdominal pain, nausea/vomiting, fevers/chills.     Avoid tobacco products, alcohol, illicit substances.    Obtain a pulse oximeter. If oxygen saturation remains continuously less than 90%, seek medical attention.     Stop taking lasix and potassium as previously prescribed for the time being. Proceed with a 2 L fluid restriction, 2 g sodium restriction, and check your weight daily. If you notice 3 or more lb weight gain in 24 hours, or 5 or more lb weight gain in 1 week, take 20mg of Lasix. Take potassium 10mEq if and/or when you do need the lasix. Hold the Spironolactone for now. This is because dehydration due to diarrhea and diuretics likely led to kidney dysfunction. I have ordered for labs to be drawn 2/15 to monitor kidney function and electrolytes. Schedule appt with PCP in 1 week to evaluate meds and labs.    The echocardiogram was the study done of your heart that shows how the heart functions overall and the health of your valves. There was a new finding of pulmonary HTN which is increased pressure in the vessels of the lungs. This is probably due to recent COVID infection and possible TONYA (see below). Additionally there is narrowing of the aortic valve which has been seen on previous echo. Recommend follow up with cardiologist for continued monitoring and treatment.     The symptoms of loud snoring and gasping for air at night could be an indication that you have a condition called obstructive sleep apnea (TONYA). Untreated TONYA can lead to resistant systemic HTN and pulmonary HTN. Recommend a sleep study which will need to be scheduled by PCP.     The CT done of the sinuses showed significant sinusitis possibly representing a bacterial infection. Augmentin has been started. Complete 7 day course and follow up with PCP for resolution. While you are on Augmentin, hold CellCept.  Discuss further with PCP/kidney transplant doctor. Continue Prograf and prednisone. Management of these medications was discussed with on call kidney transplant physician.    The tremors you are experiencing in the right hand possibly represent essential tremor. Discuss medication adjustments with PCP/Cardiology as Propranolol could be considered in place of metoprolol.     You tested positive for orthostatic hypotension which would explain the dizziness you experience when standing too quickly sometimes. This means that your blood pressure dropped significantly when you change positions from lying or sitting to standing. This can be caused by multiple medications that you are prescribed or could be related to dehydration. If symptoms persist, recommend discussing meds with PCP/cardiology to see if any of these can be held. When you stand up, recommend being in a position where you can hold on to something and you can sit back down if you feel dizzy or faint. Wait 3-5 minutes before moving forward. Recommend appropriate hydration. Recommend compression stockings as this intervention has been shown to decrease the symptoms of orthostatic hypotension.     The shortness of breath on exertion and overall fatigue/malaise you have been experiencing is likely caused by multitude of conditions. COVID has been shown to cause lasting issues with dyspnea and fatigue. It likely has contributed to pulmonary HTN while there is also a possibility of sleep apnea as an underlying pathology. These issues plus acute on chronic sinusitis (possibly ongoing since COVID) and lower blood levels than you have had in the past (anemia of chronic disease), have ultimately been what has brought you to the hospital. In addition to the dehydration from the frequent diarrhea that you experienced prior to admission. The post transplant medications you are on inhibit your body's ability to fight off infections. It is important to follow up with  repeat labs, PCP, Nephrology, ENT and Cardiology to be sure these issues continue to be managed appropriately and continue to improve.

## 2023-02-10 NOTE — SUBJECTIVE & OBJECTIVE
Subjective:   History of Present Illness:  67 year old male with KT 2005 who presented to the ED from home after not having improvement of SOB, diarrhea from diagnosis of covid 1 month ago. Reports visiting our ED and testing positive for covid, but being stable enough to go home. No interventions were provided at that time. He remained on all immunosuppressants. After going home he did not improve and did home testing kits were negative. He had periodic every other day large volume diarrhea, poor appetite, fatigue, SBOE. Fevers after initial diagnosis resolved relatively quickly. Also reports worsening tremor.   Denies syncope, chest pain, abdominal pain, nausea, vomiting, changes in urination, hematuria, hematochezia, melena      Mr. Phelps is a 67 y.o. year old male who is status post Kidney Transplant - 4/12/2005 - Not Followed  (#2).    His maintenance immunosuppression consists of:   Immunosuppressants (From admission, onward)      Start     Stop Route Frequency Ordered    02/07/23 1800  tacrolimus capsule 2 mg         -- Oral 2 times daily 02/07/23 1505            Hospital Course:  No notes on file    Interval History:  Laying flat wihtout shortness of breath . sCr increased. Better clarifies symptoms as sinus related.    Past Medical, Surgical, Family, and Social History:   Unchanged from H&P.    Scheduled Meds:   albuterol  2 puff Inhalation Q6H WAKE    apixaban  5 mg Oral BID    ascorbic acid (vitamin C)  500 mg Oral BID    aspirin  81 mg Oral Daily    atorvastatin  10 mg Oral Daily    calcium carbonate  1,000 mg Oral BID    cetirizine  5 mg Oral Daily    doxazosin  4 mg Oral Q12H    famotidine  20 mg Oral Daily    flecainide  50 mg Oral Q12H    fluticasone furoate-vilanteroL  1 puff Inhalation Daily    fluticasone propionate  1 spray Each Nostril Daily    gabapentin  300 mg Oral BID    metoprolol succinate  25 mg Oral Daily    multivitamin  1 tablet Oral Daily    mupirocin   Nasal BID    NIFEdipine  60  "mg Oral BID    paricalcitoL  1 mcg Oral Daily    polyethylene glycol  17 g Oral Daily    predniSONE  5 mg Oral Daily    remdesivir infusion  100 mg Intravenous Daily    tacrolimus  2 mg Oral BID     Continuous Infusions:   sodium chloride 0.9% 100 mL/hr at 02/09/23 1727     PRN Meds:acetaminophen, benzonatate, calcium carbonate, cyclobenzaprine, dextrose 10%, dextrose 10%, glucagon (human recombinant), glucose, glucose, hyoscyamine, melatonin, prochlorperazine, sodium chloride 0.9%    Intake/Output - Last 3 Shifts         02/07 0700  02/08 0659 02/08 0700 02/09 0659 02/09 0700  02/10 0659    P.O.  120     Total Intake(mL/kg)  120 (1.2)     Urine (mL/kg/hr)  1400 (0.6) 500 (0.4)    Total Output  1400 500    Net  -1280 -500           Urine Occurrence  2 x              Review of Systems   Constitutional:  Positive for fatigue.   HENT: Negative.     Eyes: Negative.    Respiratory:  Positive for cough and shortness of breath.    Cardiovascular: Negative.    Gastrointestinal:  Positive for diarrhea.   Endocrine: Negative.    Genitourinary: Negative.    Musculoskeletal: Negative.    Skin: Negative.    Neurological:  Positive for tremors and weakness.   Psychiatric/Behavioral: Negative.      Objective:     Vital Signs (Most Recent):  Temp: 98.3 °F (36.8 °C) (02/09/23 1629)  Pulse: 67 (02/09/23 1629)  Resp: 18 (02/09/23 1629)  BP: 136/67 (02/09/23 1629)  SpO2: 97 % (02/09/23 1629) Vital Signs (24h Range):  Temp:  [97.5 °F (36.4 °C)-98.5 °F (36.9 °C)] 98.3 °F (36.8 °C)  Pulse:  [60-84] 67  Resp:  [16-20] 18  SpO2:  [93 %-97 %] 97 %  BP: (130-150)/(64-70) 136/67     Weight: 101.9 kg (224 lb 10.4 oz)  Height: 6' 1" (185.4 cm)  Body mass index is 29.64 kg/m².    Physical Exam  Vitals and nursing note reviewed.   Constitutional:       General: He is not in acute distress.     Appearance: He is ill-appearing.   Eyes:      General: No scleral icterus.  Cardiovascular:      Rate and Rhythm: Normal rate.   Pulmonary:      Effort: " Pulmonary effort is normal. No respiratory distress.   Abdominal:      General: There is no distension.      Palpations: Abdomen is soft.   Musculoskeletal:      Right lower leg: No edema.      Left lower leg: No edema.   Skin:     General: Skin is dry.      Coloration: Skin is not jaundiced.   Neurological:      Mental Status: He is alert.       Laboratory:  Labs within the past 24 hours have been reviewed.    Diagnostic Results:  Chest X-Ray: No results found. However, due to the size of the patient record, not all encounters were searched. Please check Results Review for a complete set of results.

## 2023-02-10 NOTE — DISCHARGE SUMMARY
[facility] - Intensive Care ([place] [address])  [facility] Medicine  Discharge Summary      Patient Name: Ibrahima Phelps  MRN: [id number]  [facility] [place]: [id number]  Patient Class: IP- Inpatient  Admission Date: 2/7/2023  Hospital Length of Stay: 3 days  Discharge Date and Time:  02/10/2023 11:38 AM  Attending Physician: Kt Luna III,*   Discharging Provider: Kt Luna III, MD  Primary Care Provider: Connie Magdaleno MD  [facility] Medicine Team: [facility] MED D Kt Luna III, MD  Primary Care Team: [facility] D    HPI:   Ibrahima Phelps is a 68 yo male with HTN, HLD, CAD, CHF, CKD4 s/o kidney transplant on chronic immunosuppressive therapy, who presents to the emergency department with persistent DAUGHERTY since having COVID 1 month ago. He came to the ED at that time, but was discharge home. Since then, he becomes severely fatigued and SOB after walking minimal distances. He endorses associated coughing, intermittent headaches, intermittent hoarseness.  Endorses daily diarrhea for the past month, and decreased appetite. He has also had some intermittent fevers, although none recently.  He denies any associated chest pain or palpitations, wheezing, congestion, abdominal pain, N/V, lightheadedness, weakness, falls, urinary symptoms. The wife does report that nebulizer treatments have been successful in improving his symptoms.     In the ED: , otherwise VSS. Maintaining sats on room air. CBC with stable chronic anemia. CMP with bicarb 15 (anion gap wnl), BUN 37/ Cr 3.5 (baseline 2.8). , trop 0.062 - both baseline. EKG without ischemia. Lactate 1.3. Positive for covid. CXR without acute process. Given duo-nebs x1, 30cc/kg NS resuscitation and IV zosyn.       * No surgery found *      Hospital Course:   Transplant nephrology consulted on admission.  KATINA with slow improvement following IV fluid resuscitation, ultimately back to baseline which is 2.2-2.7 per nephrology.  Patient developed hyperkalemia on 2/8.   Patient initiated on IV fluids with isotonic bicarb in addition to IV Lasix and IV thiazide for kaliuresis. Potassium normalized. Given continued dyspnea on exertion and patient high risk for COVID progression, initiated on remdesivir. Pt remained on room air so decadron discontinued and home prednisone started. Echo revealed preserved EF, grade II diastolic dysfunction, LAE, mild AS, and pulmonary HTN. Since previous echo, pulmonary HTN is a new finding and LAE/diastolic dysfunction seems to have progressed. These new findings may be related to recent COVID infection. Pt also has symptoms consistent with TONYA and should undergo outpatient sleep study. Pt complained of some head and facial congestion with some tenderness to maxillary sinus. CT sinuses revealed sinusitis and due to immunocompromised state, ENT consulted. Endoscopy performed with no evidence of allergic fungal or invasive fungal. Augmentin started per ENT recs. The DAUGHERTY and overall fatigue/malaise likely multifactorial due to COVID, pHTN, possible TONYA, acute on possibly chronic sinusitis and anemia of chronic disease with Hgb lower than previous baseline. At this time, pt has medically improved and has reached maximum benefit of inpatient hospitalization. Walking O2 saturations checked and pt did not qualify for home oxygen. Pt is stable for discharge to follow up with repeat labs, PCP, Nephrology, ENT and Cardiology.     Of note, Pt with history of atrial fibrillation but underwent ablation last year and as far as he knows has remained in NSR since. Telemetry reviewed with no irregular rhythms reported. Discontinued telemetry during hospitalization at pt request.         Goals of Care Treatment Preferences:  Code Status: Full Code      Consults:   Consults (From admission, onward)        Status Ordering Provider     Inpatient consult to ENT  Once        Provider:  Nickie John MD    Completed LLOYD MEYERS III     Inpatient consult to  Kidney/Pancreas Transplant Medicine  Once        Provider:  (Not yet assigned)    Completed KRISTEL GUERRERO          COVID-19  DYSPNEA ON EXERTION    - Patient tested positive for covid 1/12/23 and tested positive again on admission 2/7/23. Unclear if this is new infection or residual positive from previous infection.    -DAUGHERTY possibly long COVID syndrome  -Initiated standard COVID protocols and isolation. Cont to intermittently monitor inflammatory markers. Cont Remdesivir. Decadron initially started but has been discontinued as pt remains on room air. Home prednisone started. Bronchodilators as needed for SOB.   - CXR reviewed - clear   -repeat echo reviewed and mentioned in hospital course  -six min walk to assess ambulatory o2 sats neg      Acute sinusitis  -Augmentin as recommended by ENT. Follow up with ENT outpt  -cont Flonase/antihistamines      Kidney replaced by transplant  Immunosuppression therapy     - KTM consulted, appreciate assistance. Follow up outpatient  - continue home tacrolimus and pred; hold MMF  - check tacro level daily while inpt    Chronic diastolic heart failure  -  (baseline). No evidence of volume overload on exam. CXR clear.   - last echo reviewed, repeat echo as in hospital course    Results for orders placed during the hospital encounter of 03/21/22    Echo    Interpretation Summary  · Moderate left atrial enlargement.  · The left ventricle is normal in size with concentric remodeling and normal systolic function.  · The estimated ejection fraction is 55%.  · There are segmental left ventricular wall motion abnormalities : akinetic basal inferior wall.  · Indeterminate left ventricular diastolic function.  · Mild right atrial enlargement.  · Normal right ventricular size with normal right ventricular systolic function.  · There is mild aortic valve stenosis.  · Aortic valve area is 1.84 cm2; peak velocity is 2.60 m/s; mean gradient is 13 mmHg.  · Mild aortic  regurgitation.  · Mild pulmonic regurgitation.  · The estimated PA systolic pressure is 25 mmHg.  · Normal central venous pressure (3 mmHg).          Final Active Diagnoses:    Diagnosis Date Noted POA    DAUGHERTY (dyspnea on exertion) [R06.09] 04/30/2015 Yes    COVID-19 [U07.1] 02/07/2023 Yes    Acute sinusitis [J01.90] 02/09/2023 Clinically Undetermined    Pulmonary HTN [I27.20] 02/10/2023 Clinically Undetermined    Hypocalcemia [E83.51] 02/08/2023 Yes    Prophylactic immunotherapy [Z29.8] 02/08/2023 Not Applicable    Anemia due to stage 4 chronic kidney disease [N18.4, D63.1] 06/05/2022 Yes    Kidney replaced by transplant [Z94.0] 04/26/2021 Not Applicable    Chronic diastolic heart failure [I50.32] 04/08/2020 Yes    Paroxysmal atrial fibrillation [I48.0] 09/25/2017 Yes    Long term (current) use of anticoagulants [V58.61] [Z79.01] 05/04/2015 Not Applicable    Obesity (BMI 30-39.9) [E66.9] 01/22/2015 Yes    Long-term use of immunosuppressant medication [Z79.60]  Not Applicable    Hypertension [I10]  Yes    CAD (coronary artery disease) [I25.10]  Yes    Mixed hyperlipidemia [E78.2]  Yes      Problems Resolved During this Admission:    Diagnosis Date Noted Date Resolved POA    PRINCIPAL PROBLEM:  Acute renal failure superimposed on stage 4 chronic kidney disease [N17.9, N18.4] 01/31/2022 02/10/2023 Yes    Metabolic acidosis [E87.20] 02/08/2023 02/10/2023 Yes    Diarrhea [R19.7] 11/06/2014 02/10/2023 Yes       Discharged Condition: stable    Disposition:     Follow Up:   Follow-up Information     Dalton Valentino MD Follow up in 4 week(s).    Specialty: Otolaryngology  Why: For evaluation of your sinuses  Contact information:  [address]  [facility] [address]  [phone]             Connie Magdaleno MD. Schedule an appointment as soon as possible for a visit in 1 week(s).    Specialty: Internal Medicine  Why: for evaluation of kidney function and review medication adjustments  Contact  information:  [address]  [facility] [address]  [phone]             Emre Latif MD. Schedule an appointment as soon as possible for a visit in 2 week(s).    Specialty: Nephrology  Why: Follow up KATINA on CKD and further mgmt of hypocalcemia  Contact information:  [address]  [place] [id number] [phone]                       Patient Instructions:      BASIC METABOLIC PANEL   Standing Status: Future Standing Exp. Date: 04/10/24     CBC Without Differential   Standing Status: Future Standing Exp. Date: 04/10/24     Ambulatory referral/consult to Outpatient Case Management   Referral Priority: Routine Referral Type: Consultation   Referral Reason: Specialty Services Required   Number of Visits Requested: 1       Significant Diagnostic Studies: Labs:    Pending Diagnostic Studies:     Procedure Component Value Units Date/Time    Calprotectin, Stool [[id number]] Collected: 02/09/23 1422    Order Status: Sent Lab Status: In process Updated: 02/09/23 1655    Specimen: Stool     Echo [[id number]]     Order Status: Sent Lab Status: No result     Lactate dehydrogenase [[id number]]     Order Status: Sent Lab Status: No result     Specimen: Blood     Rotavirus antigen, stool [[id number]] Collected: 02/09/23 1422    Order Status: Sent Lab Status: In process Updated: 02/09/23 1656    Specimen: Stool     Tacrolimus level [[id number]]     Order Status: Sent Lab Status: No result     Specimen: Blood          Medications:  Reconciled Home Medications:      Medication List      START taking these medications    amoxicillin-clavulanate 875-125mg 875-125 mg per tablet  Commonly known as: AUGMENTIN  Take 1 tablet by mouth every 12 (twelve) hours. for 7 days        CHANGE how you take these medications    furosemide 40 MG tablet  Commonly known as: LASIX  Take 0.5 tablets (20 mg total) by mouth daily as needed (wt gain of 3 or more pounds in 1 day or 5 or more pounds in a week).  What changed:   · when to take  this  · reasons to take this     potassium chloride 10 MEQ Tbsr  Commonly known as: KLOR-CON 10  Take 1 tablet (10 mEq total) by mouth daily as needed (to be taken with lasix).  What changed:   · how much to take  · when to take this  · reasons to take this        CONTINUE taking these medications    acetaminophen 500 MG tablet  Commonly known as: TYLENOL  Take 1 tablet (500 mg total) by mouth every 6 (six) hours as needed for Pain.     ADVAIR -21 mcg/actuation Hfaa inhaler  Generic drug: fluticasone propion-salmeterol 115-21 mcg/dose  Inhale 2 puffs into the lungs every 12 (twelve) hours. Controller     * albuterol 90 mcg/actuation inhaler  Commonly known as: VENTOLIN HFA  Inhale 2 puffs into the lungs every 6 (six) hours as needed for Wheezing or Shortness of Breath. Rescue     * albuterol 2.5 mg /3 mL (0.083 %) nebulizer solution  Commonly known as: PROVENTIL  Take 3 mLs (2.5 mg total) by nebulization every 6 (six) hours as needed for Wheezing. Rescue     ammonium lactate 12 % Crea  Apply 1 g topically once daily.     aspirin 81 MG EC tablet  Commonly known as: ECOTRIN  Take 1 tablet by mouth Daily.     atorvastatin 10 MG tablet  Commonly known as: LIPITOR  Take 10 mg by mouth.     CALCIUM 600 ORAL  Take by mouth. Pt taking 1200 daily     colchicine 0.6 mg Cap  Commonly known as: MITIGARE  One po BID prn gout flare up to 3 days     cyclobenzaprine 5 MG tablet  Commonly known as: FLEXERIL  Take 5 mg by mouth 2 (two) times daily as needed.     doxazosin 4 MG tablet  Commonly known as: CARDURA  TAKE 1 TABLET (4 MG TOTAL) BY MOUTH EVERY 12 (TWELVE) HOURS.     ELIQUIS 5 mg Tab  Generic drug: apixaban  TAKE 1 TABLET BY MOUTH TWICE A DAY     famotidine 20 MG tablet  Commonly known as: PEPCID  TAKE 1 TABLET TWICE DAILY     fexofenadine 60 MG tablet  Commonly known as: ALLEGRA  Take 1 tablet by mouth Twice daily as needed.     flecainide 50 MG Tab  Commonly known as: TAMBOCOR  Take 1 tablet (50 mg total) by mouth  every 12 (twelve) hours.     fluticasone propionate 50 mcg/actuation nasal spray  Commonly known as: FLONASE  1 spray (50 mcg total) by Each Nostril route once daily.     gabapentin 300 MG capsule  Commonly known as: NEURONTIN  TAKE 1 CAPSULE BY MOUTH TWICE A DAY     hydrALAZINE 50 MG tablet  Commonly known as: APRESOLINE  TAKE 1 TABLET (50 MG TOTAL) BY MOUTH 3 (THREE) TIMES DAILY.     hyoscyamine 0.125 mg Subl  Commonly known as: LEVSIN/SL  Place 1 tablet (0.125 mg total) under the tongue every 4 (four) hours as needed.     LIDOcaine 5 %  Commonly known as: LIDODERM  Place 1 patch onto the skin daily as needed (back pain). Remove & Discard patch within 12 hours or as directed by MD     metoprolol succinate 25 MG 24 hr tablet  Commonly known as: TOPROL-XL  Take 1 tablet (25 mg total) by mouth once daily.     multivitamin per tablet  Commonly known as: THERAGRAN  Take 1 tablet by mouth Daily.     NIFEdipine 60 MG (OSM) 24 hr tablet  Commonly known as: PROCARDIA-XL  Take 1 tablet (60 mg total) by mouth 2 (two) times a day.     omeprazole 20 MG capsule  Commonly known as: PRILOSEC  Take 1 capsule (20 mg total) by mouth once daily.     paricalcitoL 1 MCG capsule  Commonly known as: ZEMPLAR  TAKE 1 CAPSULE ON MONDAY, WEDNESDAY AND FRIDAY     predniSONE 5 MG tablet  Commonly known as: DELTASONE  TAKE 1 TABLET BY MOUTH EVERY DAY IN THE MORNING     * tacrolimus 1 MG Cap  Commonly known as: PROGRAF  Two po in am and two po in pm     * tacrolimus 0.5 MG Cap  Commonly known as: PROGRAF  Take by mouth.     vitamin D 1000 units Tab  Commonly known as: VITAMIN D3  Take 370 mg by mouth 2 (two) times daily. 2 tablets BID         * This list has 4 medication(s) that are the same as other medications prescribed for you. Read the directions carefully, and ask your doctor or other care provider to review them with you.            STOP taking these medications    FLUAD QUAD 2022-23(65Y UP)(PF) 60 mcg (15 mcg x 4)/0.5 mL Syrg  Generic  drug: flu vac 2022 [id number](PF)     mycophenolate 250 mg Cap  Commonly known as: CELLCEPT     PFIZER COVID BIVAL(12Y UP)(PF) 30 mcg/0.3 mL injection  Generic drug: sars-cov-2 (covid-19 pfizer omicron)     spironolactone 25 MG tablet  Commonly known as: ALDACTONE        ASK your doctor about these medications    clonazePAM 0.5 MG tablet  Commonly known as: KlonoPIN  Take 1 tablet (0.5 mg total) by mouth 2 (two) times daily as needed for Anxiety.            Indwelling Lines/Drains at time of discharge:   Lines/Drains/Airways     None                 Time spent on the discharge of patient: 60 minutes         Kt uLna III, MD  Department of Hospital Medicine  [facility] - Intensive Care ([place])

## 2023-02-10 NOTE — PROGRESS NOTES
Home Oxygen Evaluation    Date Performed: 2/10/2023    1) Patient's Home O2 Sat on room air, while at rest: 98%        If O2 sats on room air at rest are 88% or below, patient qualifies. No additional testing needed. Document N/A in steps 2 and 3. If 89% or above, complete steps 2.      2) Patient's O2 Sat on room air while exercisin%        If O2 sats on room air while exercising remain 89% or above patient does not qualify, no further testing needed Document N/A in step 3. If O2 sats on room air while exercising are 88% or below, continue to step 3.      3) Patient's O2 Sat while exercising on O2:  at  LPM. N/A         (Must show improvement from #2 for patients to qualify)    If O2 sats improve on oxygen, patient qualifies for portable oxygen. If not, the patient does not qualify.

## 2023-02-10 NOTE — PLAN OF CARE
[facility] - Intensive Care ([place] [address])  Discharge Final Note    Primary Care Provider: Connie Magdaleno MD    Expected Discharge Date: 2/10/2023      Future Appointments   Date Time Provider Department Center   2/24/2023 10:00 AM Alberta Loredo NP [facility] IM [facility]   3/13/2023  2:30 PM PRE-ADMIT, ENDO -[facility]   6/20/2023  9:40 AM Marcos Brunson MD [facility] PAM EPI [facility]          Final Discharge Note (most recent)       Final Note - 02/10/23 1345          Final Note    Assessment Type Final Discharge Note     Anticipated Discharge Disposition Home or Self Care     What phone number can be called within the next 1-3 days to see how you are doing after discharge? [phone]     Hospital Resources/Appts/Education Provided Appointments scheduled and added to AVS   Ambulatory referral sent to [facility].       Post-Acute Status    Post-Acute Authorization Other     Other Status No Post-Acute Service Needs     Discharge Delays None known at this time                     Important Message from Medicare             Contact Info       Dalton Valentino MD   Specialty: Otolaryngology    [address] [address]   Phone: [phone]       Next Steps: Follow up in 4 week(s)    Instructions: For evaluation of your sinuses    Connie Magdaleno MD   Specialty: Internal Medicine   Relationship: PCP - General    [address] [id number]   Phone: [phone]       Next Steps: Schedule an appointment as soon as possible for a visit in 1 week(s)    Instructions: for evaluation of kidney function and review medication adjustments    Emre Latif MD   Specialty: Nephrology   Relationship: Consulting Physician    [address] [place] [address]   Phone: [phone]       Next Steps: Schedule an appointment as soon as possible for a visit in 2 week(s)    Instructions: Follow up KATINA on CKD and further mgmt of hypocalcemia          Chica Young RN  Ext  [phone]

## 2023-02-10 NOTE — CONSULTS
[facility] - Intensive Care ([place] [address])  Otorhinolaryngology-Head & Neck Surgery  Consult Note    Patient Name: Ibrahima Phelps  MRN: [id number]  Code Status: Full Code  Admission Date: 2/7/2023  Hospital Length of Stay: 2 days  Attending Physician: Kt Luna III,*  Primary Care Provider: Connie Magdaleno MD    Patient information was obtained from patient and ER records.     Inpatient consult to ENT  Consult performed by: Nickie John MD  Consult ordered by: Kt Luna III, MD        Subjective:     Chief Complaint/Reason for Admission:  Sinusitis     History of Present Illness: 67 y.o M with hx of CKD and hx of kidney transplant on immunosuppressive therapy admitted for SOB since COVID infection. Patient was also reporting sinus pressure/pain per primary team and CT sinus was ordered which showed pansinus opacification prompting ENT consultation. Patient denies any issues with his sinuses but reports difficulty breathing through his nose from prior nasal fracture. Denies prior sinonasal surgery. Denies vision changes or numbness.       Medications:  Continuous Infusions:   sodium chloride 0.9% 100 mL/hr at 02/09/23 1810     Scheduled Meds:   albuterol  2 puff Inhalation Q6H WAKE    apixaban  5 mg Oral BID    ascorbic acid (vitamin C)  500 mg Oral BID    aspirin  81 mg Oral Daily    atorvastatin  10 mg Oral Daily    calcium carbonate  1,000 mg Oral BID    cetirizine  5 mg Oral Daily    doxazosin  4 mg Oral Q12H    famotidine  20 mg Oral Daily    flecainide  50 mg Oral Q12H    fluticasone furoate-vilanteroL  1 puff Inhalation Daily    fluticasone propionate  1 spray Each Nostril Daily    gabapentin  300 mg Oral BID    metoprolol succinate  25 mg Oral Daily    multivitamin  1 tablet Oral Daily    mupirocin   Nasal BID    NIFEdipine  60 mg Oral BID    paricalcitoL  1 mcg Oral Daily    polyethylene glycol  17 g Oral Daily    predniSONE  5 mg Oral Daily    remdesivir infusion   100 mg Intravenous Daily    tacrolimus  2 mg Oral BID     PRN Meds:acetaminophen, benzonatate, calcium carbonate, cyclobenzaprine, dextrose 10%, dextrose 10%, glucagon (human recombinant), glucose, glucose, hyoscyamine, melatonin, prochlorperazine, sodium chloride 0.9%     No current facility-administered medications on file prior to encounter.     Current Outpatient Medications on File Prior to Encounter   Medication Sig    aspirin (ECOTRIN) 81 MG EC tablet Take 1 tablet by mouth Daily.    atorvastatin (LIPITOR) 10 MG tablet Take 10 mg by mouth.    colchicine (MITIGARE) 0.6 mg Cap One po BID prn gout flare up to 3 days    cyclobenzaprine (FLEXERIL) 5 MG tablet Take 5 mg by mouth 2 (two) times daily as needed.    doxazosin (CARDURA) 4 MG tablet TAKE 1 TABLET (4 MG TOTAL) BY MOUTH EVERY 12 (TWELVE) HOURS.    ELIQUIS 5 mg Tab TAKE 1 TABLET BY MOUTH TWICE A DAY    famotidine (PEPCID) 20 MG tablet TAKE 1 TABLET TWICE DAILY    flecainide (TAMBOCOR) 50 MG Tab Take 1 tablet (50 mg total) by mouth every 12 (twelve) hours.    furosemide (LASIX) 40 MG tablet TAKE 1/2 TABLET TWICE DAILY    gabapentin (NEURONTIN) 300 MG capsule TAKE 1 CAPSULE BY MOUTH TWICE A DAY    hydrALAZINE (APRESOLINE) 50 MG tablet TAKE 1 TABLET (50 MG TOTAL) BY MOUTH 3 (THREE) TIMES DAILY.    hyoscyamine (LEVSIN/SL) 0.125 mg Subl Place 1 tablet (0.125 mg total) under the tongue every 4 (four) hours as needed.    metoprolol succinate (TOPROL-XL) 25 MG 24 hr tablet Take 1 tablet (25 mg total) by mouth once daily.    multivitamin (THERAGRAN) per tablet Take 1 tablet by mouth Daily.    mycophenolate (CELLCEPT) 250 mg Cap Take 2 capsules (500 mg total) by mouth 2 (two) times daily.    NIFEdipine (PROCARDIA-XL) 60 MG (OSM) 24 hr tablet Take 1 tablet (60 mg total) by mouth 2 (two) times a day.    paricalcitoL (ZEMPLAR) 1 MCG capsule TAKE 1 CAPSULE ON MONDAY, WEDNESDAY AND FRIDAY    predniSONE (DELTASONE) 5 MG tablet TAKE 1 TABLET BY MOUTH  EVERY DAY IN THE MORNING    spironolactone (ALDACTONE) 25 MG tablet TAKE 1 TABLET EVERY DAY    tacrolimus (PROGRAF) 0.5 MG Cap Take by mouth.    acetaminophen (TYLENOL) 500 MG tablet Take 1 tablet (500 mg total) by mouth every 6 (six) hours as needed for Pain.    albuterol (PROVENTIL) 2.5 mg /3 mL (0.083 %) nebulizer solution Take 3 mLs (2.5 mg total) by nebulization every 6 (six) hours as needed for Wheezing. Rescue    albuterol (VENTOLIN HFA) 90 mcg/actuation inhaler Inhale 2 puffs into the lungs every 6 (six) hours as needed for Wheezing or Shortness of Breath. Rescue    ammonium lactate 12 % Crea Apply 1 g topically once daily.    calcium carbonate (CALCIUM 600 ORAL) Take by mouth. Pt taking 1200 daily    clonazePAM (KLONOPIN) 0.5 MG tablet Take 1 tablet (0.5 mg total) by mouth 2 (two) times daily as needed for Anxiety. (Patient not taking: Reported on 1/5/2023)    fexofenadine (ALLEGRA) 60 MG tablet Take 1 tablet by mouth Twice daily as needed.    FLUAD QUAD 2022-23,65Y UP,,PF, 60 mcg (15 mcg x 4)/0.5 mL Syrg     fluticasone propion-salmeterol 115-21 mcg/dose (ADVAIR HFA) 115-21 mcg/actuation HFAA inhaler Inhale 2 puffs into the lungs every 12 (twelve) hours. Controller    fluticasone propionate (FLONASE) 50 mcg/actuation nasal spray 1 spray (50 mcg total) by Each Nostril route once daily.    KLOR-CON 10 10 mEq TbSR TAKE 1 TABLET BY MOUTH EVERY DAY    LIDOcaine (LIDODERM) 5 % Place 1 patch onto the skin daily as needed (back pain). Remove & Discard patch within 12 hours or as directed by MD    omeprazole (PRILOSEC) 20 MG capsule Take 1 capsule (20 mg total) by mouth once daily.    PFIZER COVID BIVAL,12Y UP,,PF, 30 mcg/0.3 mL injection     tacrolimus (PROGRAF) 1 MG Cap Two po in am and two po in pm    vitamin D 1000 units Tab Take 370 mg by mouth 2 (two) times daily. 2 tablets BID       Review of patient's allergies indicates:   Allergen Reactions    Ace inhibitors Swelling    Verapamil Other  "(See Comments)     Other reaction(s): Unknown    Povidone-iodine Itching       Past Medical History:   Diagnosis Date    (HFpEF) heart failure with preserved ejection fraction 9/25/2017    Atrial fibrillation     CAD (coronary artery disease)     CHF (congestive heart failure)     Chronic diastolic heart failure 4/8/2020    CKD (chronic kidney disease) stage 3, GFR 30-59 ml/min     Dr. Latif    CKD (chronic kidney disease) stage 3, GFR 30-59 ml/min     Diverticulosis     Fever blister     Heart attack 2006    Hyperlipidemia     Hypertension     Immunodeficiency due to treatment with immunosuppressive medication     Keloid cicatrix     Left lower quadrant abdominal pain 6/5/2022    Metabolic bone disease     Pericarditis     S/P kidney transplant     Supraventricular tachycardia 06/2019    Thrombocytopenia     Thyroid disease     Tobacco use 9/5/2014    Unspecified disorder of kidney and ureter     Stage IIICKD     Past Surgical History:   Procedure Laterality Date    CARDIOVERSION  04/30/15    CHOLECYSTECTOMY      COLONOSCOPY  April 20, 2011    Diverticulosis, repeat recommended in 3 yrs., repeat colonoscopy 2014 revealed 2 polyps.  He should return in 5 years.    COLONOSCOPY N/A 3/13/2020    Procedure: COLONOSCOPY;  Surgeon: Chon Casper MD;  Location: [facility] ([address] [place] [address]);  Service: Endoscopy;  Laterality: N/A;  ok to hold coumadin x5days-see telephone encounter 2/4/20-tb    COLONOSCOPY N/A 2/4/2021    Procedure: COLONOSCOPY;  Surgeon: Chon Casper MD;  [facility]  Service: Endoscopy;  Laterality: N/A;  Eliquis - per Dr. GAVIN Blunt ok to hold x 2 days and "restarted asap"- ERW  last prep "poor", [id number] ordered/ Pt requests Dr. Casper  prep ins. mailed - COVID screening on 2/1/21 PCW- ERW    COLONOSCOPY N/A 3/3/2021    Procedure: COLONOSCOPY;  Surgeon: Chon Casper MD;  Location: [facility] (4TH FLR);  Service: Endoscopy;  Laterality: N/A;  Patient " with his second poor bowel pre and has poor prep today.  If patient not intersted or can't get colonoscopy tomorrow will need constipation bowel prep and will need to restart his Eliquis today.Thanks,Chon     per Dr Blunt-ok to hold Eliquis 2 days prior      COVID     CORONARY ANGIOPLASTY WITH STENT PLACEMENT  2006    KIDNEY TRANSPLANT  2005    PARATHYROIDECTOMY      TREATMENT OF CARDIAC ARRHYTHMIA N/A 3/28/2022    Procedure: CARDIOVERSION;  Surgeon: Migue Blunt MD;  Location: [facility] EP LAB;  Service: Cardiology;  Laterality: N/A;  AF, DCC, MAC, GP, 3 PREP*RODRIGO deferred pt 100% compliant*     Family History       Problem Relation (Age of Onset)    Heart disease Father    Kidney disease Sister, Brother    Kidney failure Sister, Brother    No Known Problems Sister, Brother, Sister, Sister    Thyroid disease Mother          Tobacco Use    Smoking status: Former     Packs/day: 0.50     Years: 40.00     Pack years: 20.00     Types: Cigarettes     Quit date: 2022     Years since quittin.9    Smokeless tobacco: Never    Tobacco comments:     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     Patient is currently retired   Substance and Sexual Activity    Alcohol use: No    Drug use: No    Sexual activity: Yes     Partners: Female     Review of Systems  Objective:     Vital Signs (Most Recent):  Temp: 98.3 °F (36.8 °C) (23 162)  Pulse: 67 (23)  Resp: 18 (23)  BP: 136/67 (23)  SpO2: 97 % (23) Vital Signs (24h Range):  Temp:  [97.5 °F (36.4 °C)-98.5 °F (36.9 °C)] 98.3 °F (36.8 °C)  Pulse:  [60-84] 67  Resp:  [16-20] 18  SpO2:  [93 %-97 %] 97 %  BP: (130-150)/(64-70) 136/67     Weight: 101.9 kg (224 lb 10.4 oz)  Body mass index is 29.64 kg/m².    Date 23 0700 - 02/10/23 0659   Shift 6908-9470 3608-8428 0950-6382 24 Hour Total   INTAKE   I.V.(mL/kg)  2224.7(21.8)  2224.7(21.8)   IV Piggyback  241.8  241.8   Shift  Total(mL/kg)  2466.5(24.2)  2466.5(24.2)   OUTPUT   Urine(mL/kg/hr) 500(0.6)   500   Shift Total(mL/kg) 500(4.9)   500(4.9)   Weight (kg) 101.9 101.9 101.9 101.9       Physical Exam  Constitutional:       General: He is not in acute distress.     Appearance: Normal appearance.   HENT:      Head: Normocephalic and atraumatic.      Nose:      Comments: See rhinoscopy  Eyes:      Extraocular Movements: Extraocular movements intact.   Neurological:      Mental Status: He is alert.     Procedure: Flexible laryngoscopy  After explaining the procedure and obtaining verbal consent,the flexible laryngoscope was inserted into the nare and advanced to visualize the nasal cavity bilaterally.    OVERALL FINDINGS  Left- Middle turbinate with healthy appearing tissue. No obvious drainage from middle meatus. No polyps or polypoid changes.   Right- Middle turbinate with healthy appearing tissue. No obvious drainage from middle meatus. No polyps or polypoid changes.     Significant Labs:  CBC:   Recent Labs   Lab 02/09/23  1015   WBC 4.99   RBC 3.45*   HGB 8.2*   HCT 26.7*      MCV 77*   MCH 23.8*   MCHC 30.7*       Significant Diagnostics:  CT: I have reviewed all pertinent results/findings within the past 24 hours and my personal findings are:  Bilateral maxillary, ethmoid, frontal and sphenoid opacification. Heterogeneity on soft tissue window.        Assessment/Plan:     Sinus congestion  67 y.o M with hx of kidney transplant currently on immunosuppressive therapy with sinusitis. Based on CT findings, likely allergic fungal but no obvious evidence of this on endoscopy. No concern for invasive component. No drainage to culture.     - Recommend initiation of augmentin   - Sinus rinses TID while inpatient   - If unavailable, have patient use saline flushes   - Could consider steroids but given medical history, unclear if benefit worth side effects. Will defer to primary.   - No other ENT interventiosn necessary  - Patient can  follow up with Dr. Valentino on outpatient basis for evaluation once medically stable       VTE Risk Mitigation (From admission, onward)         Ordered     apixaban tablet 5 mg  2 times daily         02/07/23 1505     IP VTE HIGH RISK PATIENT  Once         02/07/23 1505     Place sequential compression device  Until discontinued         02/07/23 1505     Place RASHAD hose  Until discontinued         02/07/23 1505              Nickie John MD  Otorhinolaryngology-Head & Neck Surgery  [facility] - Intensive Care ([place]-16)

## 2023-02-10 NOTE — ASSESSMENT & PLAN NOTE
Improving   Most likely due to volume depletion from diarrhea and decreased oral intake  BL sCr 2.2-2.7  Admit sCr 3.5, with hyperkalemia and hypocalcemia and metabolic acidosis  UA with no nitrites and trace leuk est, 17 WBC  UPCR 0.7 and at baseline  KUS with no hydro, adequate velocity, no collections  Please give IV fluids

## 2023-02-10 NOTE — PROGRESS NOTES
[facility] - [facility]ensive [facility] ([facility]  Kidney Transplant  Progress Note      Reason for Follow-up: Reassessment of Kidney Transplant - 4/12/2005 - Not Followed  (#2) recipient and management of immunosuppression.    ORGAN: LEFT KIDNEY    Donor Type:           Subjective:   History of Present Illness:  67 year old male with KT 2005 who presented to the ED from home after not having improvement of SOB, diarrhea from diagnosis of covid 1 month ago. Reports visiting our ED and testing positive for covid, but being stable enough to go home. No interventions were provided at that time. He remained on all immunosuppressants. After going home he did not improve and did home testing kits were negative. He had periodic every other day large volume diarrhea, poor appetite, fatigue, SBOE. Fevers after initial diagnosis resolved relatively quickly. Also reports worsening tremor.   Denies syncope, chest pain, abdominal pain, nausea, vomiting, changes in urination, hematuria, hematochezia, melena      Mr. Phelps is a 67 y.o. year old male who is status post Kidney Transplant - 4/12/2005 - Not Followed  (#2).    His maintenance immunosuppression consists of:   Immunosuppressants (From admission, onward)      Start     Stop Route Frequency Ordered    02/07/23 1800  tacrolimus capsule 2 mg         -- Oral 2 times daily 02/07/23 1505            Hospital Course:  No notes on file    Interval History:  Patient says he feels better. Had endoscopy last night and CT scan showing sinusitis. Now on augmentin.     Past Medical, Surgical, Family, and Social History:   Unchanged from H&P.    Scheduled Meds:   albuterol  2 puff Inhalation Q6H WAKE    amoxicillin-clavulanate 875-125mg  1 tablet Oral Q12H    apixaban  5 mg Oral BID    ascorbic acid (vitamin C)  500 mg Oral BID    aspirin  81 mg Oral Daily    atorvastatin  10 mg Oral Daily    calcium carbonate  1,000 mg Oral BID    cetirizine  5 mg Oral Daily    doxazosin  4 mg Oral  Q12H    famotidine  20 mg Oral Daily    flecainide  50 mg Oral Q12H    fluticasone furoate-vilanteroL  1 puff Inhalation Daily    fluticasone propionate  1 spray Each Nostril Daily    gabapentin  300 mg Oral BID    hydrALAZINE  50 mg Oral Q8H    metoprolol succinate  25 mg Oral Daily    multivitamin  1 tablet Oral Daily    mupirocin   Nasal BID    NIFEdipine  60 mg Oral BID    paricalcitoL  1 mcg Oral Daily    polyethylene glycol  17 g Oral Daily    predniSONE  5 mg Oral Daily    tacrolimus  2 mg Oral BID     Continuous Infusions:   sodium chloride 0.9% 20 mL/hr at 02/10/23 1350     PRN Meds:acetaminophen, benzonatate, calcium carbonate, cyclobenzaprine, dextrose 10%, dextrose 10%, glucagon (human recombinant), glucose, glucose, hyoscyamine, melatonin, prochlorperazine, sodium chloride 0.9%    Intake/Output - Last 3 Shifts         02/08 0700  02/09 0659 02/09 0700  02/10 0659 02/10 0700  02/11 0659    P.O. 120  480    I.V. (mL/kg)  3402.6 (33.5) 761.6 (7.5)    IV Piggyback  241.8 247.8    Total Intake(mL/kg) 120 (1.2) 3644.3 (35.9) 1489.4 (14.7)    Urine (mL/kg/hr) 1400 (0.6) 500 (0.2) 1750 (1.8)    Emesis/NG output   0    Other   0    Stool   0    Total Output 9648 384 2872    Net -1280 +3144.3 -260.6           Urine Occurrence 2 x  2 x    Stool Occurrence  1 x 0 x    Emesis Occurrence   0 x             Review of Systems   Constitutional:  Positive for fatigue.   HENT:  Positive for postnasal drip and sinus pressure.    Eyes: Negative.    Respiratory:  Positive for cough and shortness of breath.    Cardiovascular: Negative.    Gastrointestinal:  Positive for diarrhea.   Endocrine: Negative.    Genitourinary: Negative.    Musculoskeletal: Negative.    Skin: Negative.    Neurological:  Positive for tremors and weakness.   Psychiatric/Behavioral: Negative.      Objective:     Vital Signs (Most Recent):  Temp: 97.7 °F (36.5 °C) (02/10/23 1403)  Pulse: 82 (02/10/23 1437)  Resp: 18 (02/10/23 1403)  BP: (!)  "111/59 (02/10/23 1437)  SpO2: 96 % (02/10/23 1403)   Vital Signs (24h Range):  Temp:  [97.7 °F (36.5 °C)-98.3 °F (36.8 °C)] 97.7 °F (36.5 °C)  Pulse:  [63-82] 82  Resp:  [18-20] 18  SpO2:  [82 %-98 %] 96 %  BP: (111-174)/(59-79) 111/59     Weight: 101.6 kg (224 lb)  Height: 6' 1" (185.4 cm)  Body mass index is 29.55 kg/m².    Physical Exam  Vitals and nursing note reviewed.   Constitutional:       General: He is not in acute distress.     Appearance: He is ill-appearing.   Eyes:      General: No scleral icterus.  Cardiovascular:      Rate and Rhythm: Normal rate.   Pulmonary:      Effort: Pulmonary effort is normal. No respiratory distress.   Abdominal:      General: There is no distension.      Palpations: Abdomen is soft.   Musculoskeletal:      Right lower leg: No edema.      Left lower leg: No edema.   Skin:     General: Skin is dry.      Coloration: Skin is not jaundiced.   Neurological:      Mental Status: He is alert.       Laboratory:  Labs within the past 24 hours have been reviewed.    Diagnostic Results:  None    Assessment/Plan:     Acute sinusitis  CT scan and endoscopy suggestive of sinusitis  On augmentin and will follow up with ENT  He is to hold his MMF while on abx  Discussed he should notify us or his nephrologist for further plan if he is to remain on ABX long term      Prophylactic immunotherapy  Continue immunosuppression as per problem "Long-term use of immunosuppressant medication"        Hypocalcemia  Most likely due to historical parathyroidectomy  While here increased paricalcitol to daily and calcium carbonate 1000 BID  Resume home doses when he is discharged for Dr. 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 Latif  BL sCr 2.2-2.7  Continue immunosuppression as per problem "Long-term use of immunosuppressant medication"      Long-term use of immunosuppressant medication  At home on tac 2/2, MMF " 500/500, pred 5  Hold MMF for sinusitis  He is to hold his MMF while on abx  Discussed he should notify us or his nephrologist for further plan if he is to remain on ABX longer term (more than 2-3 weeks)  Continue home tac 2/2 and pred 5  Monitor for toxicity            Jeff Diaz Jr., MD  Kidney Transplant  [facility] - Intensive Care ([place]-[facility])

## 2023-02-10 NOTE — ASSESSMENT & PLAN NOTE
Immunosuppression therapy     - KTM consulted, appreciate assistance. Follow up outpatient  - continue home tacrolimus and pred; hold MMF  - check tacro level daily while inpt

## 2023-02-10 NOTE — PROGRESS NOTES
[facility] - Intensive Care ([facility])  Kidney Transplant  Progress Note      Reason for Follow-up: Reassessment of Kidney Transplant - 4/12/2005 - Not Followed  (#2) recipient and management of immunosuppression.    ORGAN: LEFT KIDNEY    Donor Type:         Subjective:   History of Present Illness:  67 year old male with KT 2005 who presented to the ED from home after not having improvement of SOB, diarrhea from diagnosis of covid 1 month ago. Reports visiting our ED and testing positive for covid, but being stable enough to go home. No interventions were provided at that time. He remained on all immunosuppressants. After going home he did not improve and did home testing kits were negative. He had periodic every other day large volume diarrhea, poor appetite, fatigue, SBOE. Fevers after initial diagnosis resolved relatively quickly. Also reports worsening tremor.   Denies syncope, chest pain, abdominal pain, nausea, vomiting, changes in urination, hematuria, hematochezia, melena      Mr. Phelps is a 67 y.o. year old male who is status post Kidney Transplant - 4/12/2005 - Not Followed  (#2).    His maintenance immunosuppression consists of:   Immunosuppressants (From admission, onward)      Start     Stop Route Frequency Ordered    02/07/23 1800  tacrolimus capsule 2 mg         -- Oral 2 times daily 02/07/23 1505            Hospital Course:  No notes on file    Interval History:  Laying flat wihtout shortness of breath . sCr increased. Better clarifies symptoms as sinus related.    Past Medical, Surgical, Family, and Social History:   Unchanged from H&P.    Scheduled Meds:   albuterol  2 puff Inhalation Q6H WAKE    apixaban  5 mg Oral BID    ascorbic acid (vitamin C)  500 mg Oral BID    aspirin  81 mg Oral Daily    atorvastatin  10 mg Oral Daily    calcium carbonate  1,000 mg Oral BID    cetirizine  5 mg Oral Daily    doxazosin  4 mg Oral Q12H    famotidine  20 mg Oral Daily    flecainide  50 mg Oral  "Q12H    fluticasone furoate-vilanteroL  1 puff Inhalation Daily    fluticasone propionate  1 spray Each Nostril Daily    gabapentin  300 mg Oral BID    metoprolol succinate  25 mg Oral Daily    multivitamin  1 tablet Oral Daily    mupirocin   Nasal BID    NIFEdipine  60 mg Oral BID    paricalcitoL  1 mcg Oral Daily    polyethylene glycol  17 g Oral Daily    predniSONE  5 mg Oral Daily    remdesivir infusion  100 mg Intravenous Daily    tacrolimus  2 mg Oral BID     Continuous Infusions:   sodium chloride 0.9% 100 mL/hr at 02/09/23 1727     PRN Meds:acetaminophen, benzonatate, calcium carbonate, cyclobenzaprine, dextrose 10%, dextrose 10%, glucagon (human recombinant), glucose, glucose, hyoscyamine, melatonin, prochlorperazine, sodium chloride 0.9%    Intake/Output - Last 3 Shifts         02/07 0700  02/08 0659 02/08 0700 02/09 0659 02/09 0700  02/10 0659    P.O.  120     Total Intake(mL/kg)  120 (1.2)     Urine (mL/kg/hr)  1400 (0.6) 500 (0.4)    Total Output  1400 500    Net  -1280 -500           Urine Occurrence  2 x              Review of Systems   Constitutional:  Positive for fatigue.   HENT: Negative.     Eyes: Negative.    Respiratory:  Positive for cough and shortness of breath.    Cardiovascular: Negative.    Gastrointestinal:  Positive for diarrhea.   Endocrine: Negative.    Genitourinary: Negative.    Musculoskeletal: Negative.    Skin: Negative.    Neurological:  Positive for tremors and weakness.   Psychiatric/Behavioral: Negative.      Objective:     Vital Signs (Most Recent):  Temp: 98.3 °F (36.8 °C) (02/09/23 1629)  Pulse: 67 (02/09/23 1629)  Resp: 18 (02/09/23 1629)  BP: 136/67 (02/09/23 1629)  SpO2: 97 % (02/09/23 1629) Vital Signs (24h Range):  Temp:  [97.5 °F (36.4 °C)-98.5 °F (36.9 °C)] 98.3 °F (36.8 °C)  Pulse:  [60-84] 67  Resp:  [16-20] 18  SpO2:  [93 %-97 %] 97 %  BP: (130-150)/(64-70) 136/67     Weight: 101.9 kg (224 lb 10.4 oz)  Height: 6' 1" (185.4 cm)  Body mass index is " "29.64 kg/m².    Physical Exam  Vitals and nursing note reviewed.   Constitutional:       General: He is not in acute distress.     Appearance: He is ill-appearing.   Eyes:      General: No scleral icterus.  Cardiovascular:      Rate and Rhythm: Normal rate.   Pulmonary:      Effort: Pulmonary effort is normal. No respiratory distress.   Abdominal:      General: There is no distension.      Palpations: Abdomen is soft.   Musculoskeletal:      Right lower leg: No edema.      Left lower leg: No edema.   Skin:     General: Skin is dry.      Coloration: Skin is not jaundiced.   Neurological:      Mental Status: He is alert.       Laboratory:  Labs within the past 24 hours have been reviewed.    Diagnostic Results:  Chest X-Ray: No results found. However, due to the size of the patient record, not all encounters were searched. Please check Results Review for a complete set of results.    Assessment/Plan:     * Acute renal failure superimposed on stage 4 chronic kidney disease  Improving   Most likely due to volume depletion from diarrhea and decreased oral intake  BL sCr 2.2-2.7  Admit sCr 3.5, with hyperkalemia and hypocalcemia and metabolic acidosis  UA with no nitrites and trace leuk est, 17 WBC  UPCR 0.7 and at baseline  KUS with no hydro, adequate velocity, no collections  Please give IV fluids    Sinus congestion  CT scan       Prophylactic immunotherapy  Continue immunosuppression as per problem "Long-term use of immunosuppressant medication"        Hypocalcemia  Most likely due to historical parathyroidectomy  While here increased paricalcitol to daily and calcium carbonate 1000 BID  Resume home doses when he is discharged for Dr. 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 Latif  BL sCr 2.2-2.7  Continue immunosuppression as per problem "Long-term use of immunosuppressant medication"      Diarrhea  Improved   Agree with " infectious stool studies      Long-term use of immunosuppressant medication  At home on tac 2/2, /500, pred 5  Hold MMF for covid infection  Continue home tac 2/2 and pred 5  Monitor for toxicity            Jeff Diaz Jr., MD  Kidney Transplant  [facility] - Intensive Care ([facility]

## 2023-02-10 NOTE — PROGRESS NOTES
Patient requested six minute walk test to be done in the AM; ENT at the bedside and patient states he is tired.

## 2023-02-10 NOTE — HPI
67 y.o M with hx of CKD and hx of kidney transplant on immunosuppressive therapy admitted for SOB since COVID infection. Patient was also reporting sinus pressure/pain per primary team and CT sinus was ordered which showed pansinus opacification prompting ENT consultation. Patient denies any issues with his sinuses but reports difficulty breathing through his nose from prior nasal fracture. Denies prior sinonasal surgery. Denies vision changes or numbness.

## 2023-02-10 NOTE — PLAN OF CARE
Problem: Adult Inpatient Plan of Care  Goal: Plan of Care Review  Outcome: Ongoing, Progressing  Flowsheets (Taken 2/9/2023 1841)  Plan of Care Reviewed With: patient  Goal: Patient-Specific Goal (Individualized)  Outcome: Ongoing, Progressing  Goal: Absence of Hospital-Acquired Illness or Injury  Outcome: Ongoing, Progressing  Intervention: Identify and Manage Fall Risk  Flowsheets (Taken 2/9/2023 1841)  Safety Promotion/Fall Prevention:   assistive device/personal item within reach   side rails raised x 2  Intervention: Prevent Skin Injury  Flowsheets (Taken 2/9/2023 1841)  Body Position: sitting up in bed  Skin Protection: tubing/devices free from skin contact  Intervention: Prevent and Manage VTE (Venous Thromboembolism) Risk  Flowsheets (Taken 2/9/2023 1841)  Activity Management:   Ambulated to bathroom - L4   Ambulated in gray - L4  VTE Prevention/Management: bleeding risk assessed     Patient alert and oriented x4; care plan discussed with patient; free from falls/injuries during the shift; call light and personal belongings within reach; patient showered and bed linens changed; sodium bicarbonate order d/c'd; NS at 100 mL/hr initiated; tele order d/c'd; 6 MWT pending, pt asked that this be done in the AM; patient able to express needs, no needs at this time; will continue with poc.

## 2023-02-10 NOTE — NURSING
Patient given d/c instructions; IV removed with catheter intact; RX delivered to bedside; patient transported via wheelchair to private vehicle.

## 2023-02-10 NOTE — ASSESSMENT & PLAN NOTE
67 y.o M with hx of kidney transplant currently on immunosuppressive therapy with sinusitis. Based on CT findings, likely allergic fungal but no obvious evidence of this on endoscopy. No concern for invasive component. No drainage to culture.     - Recommend initiation of augmentin   - Sinus rinses TID while inpatient   - If unavailable, have patient use saline flushes   - Could consider steroids but given medical history, unclear if benefit worth side effects. Will defer to primary.   - No other ENT interventiosn necessary  - Patient can follow up with Dr. Valentino on outpatient basis for evaluation once medically stable    Plan   1. CBC WITH AUTOMATED DIFFERENTIAL; Status:Active; Requested for:[id number];    2. SGOT/AST; Status:Active; Requested for:[id number];    3. SGPT/ALT; Status:Active; Requested for:[id number];    4. Leflunomide 10 MG Oral Tablet; TAKE 1 TABLET DAILY    Discussion/Summary  Discuss labs with patient.. ALT normalizes, AST only minimally elevated, improved a lot.   Will start Leflunomide as discussed.  Blood test in 3 weeks to monitor for toxicity.  Patient expressed understanding and agreed.    Follow up in 2 months      Signatures   Electronically signed by : VIKI HOFFMAN M.D.; Jun 29 2017  2:47PM CST

## 2023-02-10 NOTE — ASSESSMENT & PLAN NOTE
At home on tac 2/2, /500, pred 5  Hold MMF for sinusitis  He is to hold his MMF while on abx  Discussed he should notify us or his nephrologist for further plan if he is to remain on ABX longer term (more than 2-3 weeks)  Continue home tac 2/2 and pred 5  Monitor for toxicity

## 2023-02-11 ENCOUNTER — NURSE TRIAGE (OUTPATIENT)
Dept: ADMINISTRATIVE | Facility: CLINIC | Age: 68
End: 2023-02-11
Payer: MEDICARE

## 2023-02-11 NOTE — TELEPHONE ENCOUNTER
Pt called for covid surveillance enrollment #1 and no answer I did leave VM and sent previous my chart message and told to call back as needed. Pt will be tried again this afternoon. Pt left OOC number to reach out if needed.            Reason for Disposition   Message left on unidentified voice mail.  Phone number verified.    Protocols used: No Contact or Duplicate Contact Call-A-AH

## 2023-02-12 ENCOUNTER — NURSE TRIAGE (OUTPATIENT)
Dept: ADMINISTRATIVE | Facility: CLINIC | Age: 68
End: 2023-02-12
Payer: MEDICARE

## 2023-02-12 ENCOUNTER — PATIENT MESSAGE (OUTPATIENT)
Dept: ADMINISTRATIVE | Facility: CLINIC | Age: 68
End: 2023-02-12
Payer: MEDICARE

## 2023-02-12 LAB
BACTERIA BLD CULT: NORMAL
BACTERIA BLD CULT: NORMAL

## 2023-02-12 NOTE — TELEPHONE ENCOUNTER
Expert [employer] message sent.    2nd attempt to reach Pt for enrollment into cc program conducted.    VM left for Pt.    Another attempt will be made to contact Pt for enrollment into cc program at another time.    Encounter routed to pcp/staff.  Reason for Disposition   Message left on unidentified voice mail.  Phone number verified.    Protocols used: No Contact or Duplicate Contact Call-A-AH

## 2023-02-12 NOTE — TELEPHONE ENCOUNTER
Covid Surveillance Program Enrollment Attempt #3:     Pt called for covid surveillance enrollment. Program overview provided. Pt expressed interest in program but is resting and now is not a good time to go through enrollment. Pt requests additional call back later. One additional attempt to be made. Pt advised directions are in his my chart for him to try to self enroll and if he does not someone will reach out tomorrow. Pt advised to reach out to OOC with concerns or worsening symptoms.       Reason for Disposition   [1] Follow-up call to recent contact AND [2] information only call, no triage required    Additional Information   Negative: RN needs further essential information from caller in order to complete triage   Negative: Requesting regular office appointment   Negative: [1] Caller requesting NON-URGENT health information AND [2] PCP's office is the best resource   Negative: Health Information question, no triage required and triager able to answer question    Protocols used: Information Only Call - No Triage-A-

## 2023-02-13 ENCOUNTER — PATIENT MESSAGE (OUTPATIENT)
Dept: ADMINISTRATIVE | Facility: OTHER | Age: 68
End: 2023-02-13
Payer: MEDICARE

## 2023-02-13 ENCOUNTER — NURSE TRIAGE (OUTPATIENT)
Dept: ADMINISTRATIVE | Facility: CLINIC | Age: 68
End: 2023-02-13
Payer: MEDICARE

## 2023-02-13 ENCOUNTER — PATIENT OUTREACH (OUTPATIENT)
Dept: ADMINISTRATIVE | Facility: CLINIC | Age: 68
End: 2023-02-13
Payer: MEDICARE

## 2023-02-13 LAB — BACTERIA STL CULT: NORMAL

## 2023-02-13 NOTE — TELEPHONE ENCOUNTER
"Kidney transplant pt.Pt. Escalated in COVID-19 Surveillance Program due to reporting 2/5 SOB at rest 3/5 SOB with activity. Pt declined triage this time. ,"I dont need any help at this time." Pt advised to call back with further concerns.  Reason for Disposition   Information only question and nurse able to answer    Protocols used: Information Only Call - No Triage-A-OH    "

## 2023-02-13 NOTE — TELEPHONE ENCOUNTER
Spoke to pt and he stated he as in the hospital last week on the 10 for covid.   Patient stated he's doing fine declined SOB or any other symptoms.

## 2023-02-13 NOTE — PROGRESS NOTES
C3 nurse spoke with Ibrahima SUKHI Phelps and wife for a TCC post hospital discharge follow up call. The patient has a scheduled HOSFU appointment with Alberta Loredo NP on 2/24/23 @ 10am. Routed message to please contact pt if sooner appt becomes available.

## 2023-02-14 ENCOUNTER — PATIENT MESSAGE (OUTPATIENT)
Dept: ADMINISTRATIVE | Facility: OTHER | Age: 68
End: 2023-02-14
Payer: MEDICARE

## 2023-02-14 LAB — CALPROTECTIN STL-MCNT: 98.1 MCG/G

## 2023-02-15 ENCOUNTER — NURSE TRIAGE (OUTPATIENT)
Dept: ADMINISTRATIVE | Facility: CLINIC | Age: 68
End: 2023-02-15
Payer: MEDICARE

## 2023-02-15 ENCOUNTER — PATIENT MESSAGE (OUTPATIENT)
Dept: ADMINISTRATIVE | Facility: OTHER | Age: 68
End: 2023-02-15
Payer: MEDICARE

## 2023-02-15 ENCOUNTER — PATIENT MESSAGE (OUTPATIENT)
Dept: ADMINISTRATIVE | Facility: CLINIC | Age: 68
End: 2023-02-15
Payer: MEDICARE

## 2023-02-15 DIAGNOSIS — E11.9 TYPE 2 DIABETES MELLITUS WITHOUT COMPLICATION, UNSPECIFIED WHETHER LONG TERM INSULIN USE: ICD-10-CM

## 2023-02-15 NOTE — TELEPHONE ENCOUNTER
[email or web address] message sent for no response to cc push for vs.    Pt called for no response to cc push for vs.    Foot swollen on left side, left foot is blue and swollen compared to the other side, Pt said he just woke up this morning and it was like that. Ankle swelling protocol used and Pt instructed to go to the ER now for immediate medical attention and if he cannot make it there for any reason or he becomes worse to call 911 now instead. Alert and oriented, Pt agrees to follow dispo.  Reason for Disposition   Entire foot is cool or blue in comparison to other side    Additional Information   Negative: Followed an ankle injury   Negative: Chest pain   Negative: Followed a bee sting and has localized swelling (e.g., small area of puffy or swollen skin)   Negative: Followed an insect bite and has localized swelling (e.g., small area of puffy or swollen skin)   Negative: Swelling of both ankles (i.e., pedal edema)   Negative: Ankle pain is main symptom   Negative: Swelling of calf or leg is main symptom   Negative: Difficulty breathing    Protocols used: Ankle Swelling-A-OH

## 2023-02-15 NOTE — TELEPHONE ENCOUNTER
[employer] message sent for nor response to cc push.    Pt called to put in vs for cc push. Pt said he did put his vs in. Education provided on need for another set. Reinforced to Pt that he needs to go into the ER now for prior dispo of ER now for swollen foot blue compared to the other. Pt said he did not go into the ER earlier to get his foot looked at. Reinforced Pt to go in to the ER now to get his foot examined. Pt agreed to going to the ER now.     Encounter routed to pcp/staff as high priority.  Reason for Disposition   Entire foot is cool or blue in comparison to other side    Protocols used: Ankle Swelling-A-OH

## 2023-02-16 ENCOUNTER — PATIENT MESSAGE (OUTPATIENT)
Dept: ADMINISTRATIVE | Facility: CLINIC | Age: 68
End: 2023-02-16
Payer: MEDICARE

## 2023-02-16 ENCOUNTER — NURSE TRIAGE (OUTPATIENT)
Dept: ADMINISTRATIVE | Facility: CLINIC | Age: 68
End: 2023-02-16
Payer: MEDICARE

## 2023-02-16 ENCOUNTER — HOSPITAL ENCOUNTER (EMERGENCY)
Facility: HOSPITAL | Age: 68
Discharge: HOME | End: 2023-02-16
Attending: EMERGENCY MEDICINE
Payer: MEDICARE

## 2023-02-16 VITALS
SYSTOLIC BLOOD PRESSURE: 127 MMHG | RESPIRATION RATE: 20 BRPM | TEMPERATURE: 98 F | OXYGEN SATURATION: 98 % | HEART RATE: 73 BPM | DIASTOLIC BLOOD PRESSURE: 74 MMHG

## 2023-02-16 DIAGNOSIS — L03.90 CELLULITIS, UNSPECIFIED CELLULITIS SITE: ICD-10-CM

## 2023-02-16 DIAGNOSIS — M79.672 FOOT PAIN, LEFT: Primary | ICD-10-CM

## 2023-02-16 PROCEDURE — 99283 EMERGENCY DEPT VISIT LOW MDM: CPT

## 2023-02-16 PROCEDURE — 99284 PR EMERGENCY DEPT VISIT,LEVEL IV: ICD-10-PCS | Mod: ,,, | Performed by: EMERGENCY MEDICINE

## 2023-02-16 PROCEDURE — 99284 EMERGENCY DEPT VISIT MOD MDM: CPT | Mod: ,,, | Performed by: EMERGENCY MEDICINE

## 2023-02-16 RX ORDER — CEPHALEXIN 500 MG/1
500 CAPSULE ORAL 4 TIMES DAILY
Qty: 28 CAPSULE | Refills: 0 | Status: ON HOLD | OUTPATIENT
Start: 2023-02-17 | End: 2023-03-06 | Stop reason: HOSPADM

## 2023-02-16 RX ORDER — CEPHALEXIN 500 MG/1
500 CAPSULE ORAL
Status: DISCONTINUED | OUTPATIENT
Start: 2023-02-16 | End: 2023-02-16

## 2023-02-16 NOTE — TELEPHONE ENCOUNTER
Spoke to wife and readvised  of need to present to the ed as this can be a serious problem. Wife verbalized understanding.

## 2023-02-16 NOTE — DISCHARGE INSTRUCTIONS
Please take antibiotic as instructed starting tomorrow February 17th.  Apply warm compresses to the left foot.  Elevate foot as much as possible.      Please return to the emergency department if you experience increased pain, coolness to the touch, or blue discoloration.

## 2023-02-16 NOTE — ED PROVIDER NOTES
Encounter Date: 2/16/2023       History     Chief Complaint   Patient presents with    Foot Pain     Hard to move, started yesterday      67-year-old male with past medical history of hypertension, hyperlipidemia, CAD, AFib on Eliquis, CHF, CKD 4 status post kidney transplant on immunosuppressive therapy who presents with left foot pain.  Of note, patient recently hospitalized and treated for COVID pneumonia.  He states that he woke up yesterday morning with pain to the dorsal aspect of his left foot.  Patient reports that the pain is worse with walking but manageable at rest.  He denies trauma but has not taken medications to alleviate his pain.  He spoke with his physician who advised him to come to the ED due to concern for blood clot.  He denies radiation of pain and numbness/paresthesias.  He denies chest pain, shortness of breath, nausea, vomiting, diarrhea, headaches, lightheadedness, abdominal pain.    Review of patient's allergies indicates:   Allergen Reactions    Ace inhibitors Swelling    Verapamil Other (See Comments)     Other reaction(s): Unknown    Povidone-iodine Itching     Past Medical History:   Diagnosis Date    (HFpEF) heart failure with preserved ejection fraction 9/25/2017    Atrial fibrillation     CAD (coronary artery disease)     CHF (congestive heart failure)     Chronic diastolic heart failure 4/8/2020    CKD (chronic kidney disease) stage 3, GFR 30-59 ml/min     Dr. Latif    CKD (chronic kidney disease) stage 3, GFR 30-59 ml/min     Diverticulosis     Fever blister     Heart attack 2006    Hyperlipidemia     Hypertension     Immunodeficiency due to treatment with immunosuppressive medication     Keloid cicatrix     Left lower quadrant abdominal pain 6/5/2022    Metabolic bone disease     Pericarditis     S/P kidney transplant     Supraventricular tachycardia 06/2019    Thrombocytopenia     Thyroid disease     Tobacco use 9/5/2014    Unspecified disorder of kidney and ureter     Stage  "IIICKD     Past Surgical History:   Procedure Laterality Date    CARDIOVERSION  04/30/15    CHOLECYSTECTOMY      COLONOSCOPY  April 20, 2011    Diverticulosis, repeat recommended in 3 yrs., repeat colonoscopy 2014 revealed 2 polyps.  He should return in 5 years.    COLONOSCOPY N/A 3/13/2020    Procedure: COLONOSCOPY;  Surgeon: Chon Casper MD;  Location: [facility] (4TH FLR);  Service: Endoscopy;  Laterality: N/A;  ok to hold coumadin x5days-see telephone encounter 2/4/20-tb    COLONOSCOPY N/A 2/4/2021    Procedure: COLONOSCOPY;  Surgeon: Chon Casper MD;  Location: [facility] (4TH FLR);  Service: Endoscopy;  Laterality: N/A;  Eliquis - per Dr. GAVIN Blunt ok to hold x 2 days and "restarted asap"- ERW  last prep "poor", [id number] ordered/ Pt requests Dr. Casper  prep ins. mailed - COVID screening on 2/1/21 PCW- ERW    COLONOSCOPY N/A 3/3/2021    Procedure: COLONOSCOPY;  Surgeon: Chon Casper MD;  Location: [facility] (4TH FLR);  Service: Endoscopy;  Laterality: N/A;  Patient with his second poor bowel pre and has poor prep today.  If patient not intersted or can't get colonoscopy tomorrow will need constipation bowel prep and will need to restart his Eliquis today.Thanks,Chon Blunt-ok to hold Eliquis 2 days prior      COVID     CORONARY ANGIOPLASTY WITH STENT PLACEMENT  9/13/2006    KIDNEY TRANSPLANT  2005    PARATHYROIDECTOMY      TREATMENT OF CARDIAC ARRHYTHMIA N/A 3/28/2022    Procedure: CARDIOVERSION;  Surgeon: Migue Blunt MD;  Location: [facility] EP LAB;  Service: Cardiology;  Laterality: N/A;  AF, DCC, MAC, GP, 3 PREP*RODRIGO deferred pt 100% compliant*     Family History   Problem Relation Age of Onset    No Known Problems Sister     No Known Problems Brother     Thyroid disease Mother         s/p surgery    Heart disease Father         had pacemaker    No Known Problems Sister     Kidney failure Sister     Kidney disease Sister     No Known Problems Sister     Kidney disease Brother     Kidney " failure Brother         s/p transplant    Diabetes Mellitus Neg Hx     Stroke Neg Hx     Heart attack Neg Hx     Cancer Neg Hx     Celiac disease Neg Hx     Cirrhosis Neg Hx     Colon cancer Neg Hx     Esophageal cancer Neg Hx     Inflammatory bowel disease Neg Hx     Rectal cancer Neg Hx     Stomach cancer Neg Hx     Ulcerative colitis Neg Hx     Liver disease Neg Hx     Liver cancer Neg Hx     Crohn's disease Neg Hx     Melanoma Neg Hx      Social History     Tobacco Use    Smoking status: Former     Packs/day: 0.50     Years: 40.00     Pack years: 20.00     Types: Cigarettes     Quit date: 2022     Years since quittin.9    Smokeless tobacco: Never    Tobacco comments:     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     Patient is currently retired   Substance Use Topics    Alcohol use: No    Drug use: No     Review of Systems   Constitutional:  Positive for fatigue. Negative for chills and fever.   HENT:  Negative for congestion and sinus pain.    Eyes:  Negative for photophobia and visual disturbance.   Respiratory:  Negative for chest tightness.    Cardiovascular:  Negative for chest pain and leg swelling.   Gastrointestinal:  Negative for abdominal distention, abdominal pain, constipation, diarrhea, nausea and vomiting.   Genitourinary:  Negative for difficulty urinating and dysuria.   Musculoskeletal:  Positive for arthralgias (Foot pain). Negative for neck pain.   Skin:  Positive for rash and wound (Questionable wound on dorsal aspect of left foot).   Neurological:  Positive for weakness. Negative for dizziness, light-headedness and headaches.   Psychiatric/Behavioral:  Negative for confusion and decreased concentration.      Physical Exam     Initial Vitals [23 1227]   BP Pulse Resp Temp SpO2   (!) 155/67 85 18 98 °F (36.7 °C) 98 %      MAP       --         Physical Exam    Constitutional: He appears well-developed and well-nourished. He is not diaphoretic.  No distress.   HENT:   Head: Normocephalic and atraumatic.   Eyes: EOM are normal.   Neck: Neck supple.   Normal range of motion.  Cardiovascular:  Normal rate, regular rhythm, normal heart sounds and intact distal pulses.           No murmur heard.  Dorsalis pedis and posterior tibial pulses palpable bilateral lower extremity.   Pulmonary/Chest: Breath sounds normal. No respiratory distress.   Abdominal: Abdomen is soft. Bowel sounds are normal. He exhibits no distension. There is no abdominal tenderness. There is no rebound and no guarding.   Musculoskeletal:         General: Tenderness (Tenderness and pain with movement and palpation of the left foot.) present.      Cervical back: Normal range of motion and neck supple.     Neurological: He is alert and oriented to person, place, and time.   Skin: Skin is warm and dry. There is erythema (Mild erythema noted to dorsal aspect left foot.).   Psychiatric: He has a normal mood and affect. His behavior is normal. Judgment and thought content normal.       ED Course   Procedures  Labs Reviewed - No data to display       Imaging Results    None          Medications - No data to display  Medical Decision Making:   Initial Assessment:   67-year-old male with extensive past medical history presenting with pain of left foot.  Patient is mildly hypertensive but otherwise afebrile and hemodynamically stable.  Patient is on immunosuppressive therapy status post kidney transplant, but he does not appear to be in apparent distress.  Differential Diagnosis:   Cellulitis   PAD   Superficial venous clot  PVD  DVT   Spider bite    ED Management:  Patient determined to have intact distal pulses on physical exam.  Patient afebrile and hemodynamically stable.  Patient will be sent home with 1 week of Keflex.  He was instructed to use warm compresses and elevate his leg above his butt.  Patient instructed to return to the emergency department if he experiences worsening pain, blue  discoloration, or cool extremities  Other:   I have discussed this case with another health care provider.       <> Summary of the Discussion: Discussed case with Dr. Medina and patient to be discharged with 1 week of Keflex.  Strict return precautions given to patient.                        Clinical Impression:   Final diagnoses:  [M79.672] Foot pain, left (Primary)  [L03.90] Cellulitis, unspecified cellulitis site        ED Disposition Condition    Discharge Stable          ED Prescriptions       Medication Sig Dispense Start Date End Date Auth. Provider    cephALEXin (KEFLEX) 500 MG capsule Take 1 capsule (500 mg total) by mouth 4 (four) times daily. for 7 days 28 capsule 2/17/2023 2/24/2023 Rudy Ohara MD          Follow-up Information       Follow up With Specialties Details Why Contact Info    Connie Magdaleno MD Internal Medicine   [address]  [place] [address]  [phone]               Rudy Ohara MD  Resident  02/16/23 [id number]

## 2023-02-16 NOTE — TELEPHONE ENCOUNTER
"[employer] message sent for no response to cc push.    Pt called for no response to cc push for vs.    Person who answered said she was "trying to get him ready because we are going to the hospital."    Encounter routed to pcp/staff as high priority.  Reason for Disposition   Patient already left for the hospital/clinic    Protocols used: No Contact or Duplicate Contact Call-A-OH    "

## 2023-02-17 ENCOUNTER — NURSE TRIAGE (OUTPATIENT)
Dept: ADMINISTRATIVE | Facility: CLINIC | Age: 68
End: 2023-02-17
Payer: MEDICARE

## 2023-02-17 ENCOUNTER — PATIENT MESSAGE (OUTPATIENT)
Dept: ADMINISTRATIVE | Facility: CLINIC | Age: 68
End: 2023-02-17
Payer: MEDICARE

## 2023-02-17 NOTE — TELEPHONE ENCOUNTER
Pt called for no response for covid surveillance and hasnt put in vitals in several days and will put in [id number] and remove orders. I left Vm to reach out to oOc if any problems or concerns.       Reason for Disposition   Message left on unidentified voice mail. Phone number verified.    Protocols used: No Contact or Duplicate Contact Call-A-OH

## 2023-02-18 ENCOUNTER — PATIENT MESSAGE (OUTPATIENT)
Dept: ADMINISTRATIVE | Facility: CLINIC | Age: 68
End: 2023-02-18
Payer: MEDICARE

## 2023-02-18 ENCOUNTER — PATIENT MESSAGE (OUTPATIENT)
Dept: ADMINISTRATIVE | Facility: OTHER | Age: 68
End: 2023-02-18
Payer: MEDICARE

## 2023-02-18 ENCOUNTER — NURSE TRIAGE (OUTPATIENT)
Dept: ADMINISTRATIVE | Facility: CLINIC | Age: 68
End: 2023-02-18
Payer: MEDICARE

## 2023-02-18 NOTE — TELEPHONE ENCOUNTER
Pt called in response to message that he was unable to find place to submit vitals. On first chart review- appears pt had not submitted consent, therefore would not have been able to submit. However, pt had previously been submitting. Pt called- on chart review, consent has just been singed and submitted and states he is now able to see where to submit. Will submit shortly. Pt states he has no concerns or questions at this time. Advised to call OOC with needs. Verbalized understanding. Will monitor for VS.   Reason for Disposition   [1] Follow-up call to recent contact AND [2] information only call, no triage required    Additional Information   Negative: RN needs further essential information from caller in order to complete triage   Negative: Requesting regular office appointment   Negative: [1] Caller requesting NON-URGENT health information AND [2] PCP's office is the best resource    Protocols used: Information Only [facility] - [facility]

## 2023-02-19 ENCOUNTER — NURSE TRIAGE (OUTPATIENT)
Dept: ADMINISTRATIVE | Facility: CLINIC | Age: 68
End: 2023-02-19
Payer: MEDICARE

## 2023-02-19 ENCOUNTER — PATIENT MESSAGE (OUTPATIENT)
Dept: ADMINISTRATIVE | Facility: OTHER | Age: 68
End: 2023-02-19
Payer: MEDICARE

## 2023-02-19 NOTE — TELEPHONE ENCOUNTER
"Pt contacted for Surveillance escalation - Kidney transplant X 2 1996 / 2005. PO2=94%. On retake PO2=93% after deep breathing. Pt reports feeling increase sob with ambulation. Denies any swelling to lower ext. Covid 19 protocol used and pt advised to see provider with in 4 hours. Pt refused Nt calling PCP,   Dr. Voss. [facility] offered and declined.   NT did speak with Dr. PAMELA Bryant on call transplant provider. States " I am not the surveillance doctor I am the kidney doctor. If pt is having sob he needs to go to ED. "  Pt advised as recommended per provider.   Pt reports he will continue to monitor and if SOB increases he will go to ED.   Pt instructed to call OOC for worsening of symptoms or health questions.   Reason for Disposition   MILD difficulty breathing (e.g., minimal/no SOB at rest, SOB with walking, pulse <100)    Additional Information   Negative: SEVERE difficulty breathing (e.g., struggling for each breath, speaks in single words)   Negative: Difficult to awaken or acting confused (e.g., disoriented, slurred speech)   Negative: Bluish (or gray) lips or face now   Negative: Shock suspected (e.g., cold/pale/clammy skin, too weak to stand, low BP, rapid pulse)   Negative: Sounds like a life-threatening emergency to the triager   Negative: SEVERE or constant chest pain or pressure  (Exception: Mild central chest pain, present only when coughing.)   Negative: MODERATE difficulty breathing (e.g., speaks in phrases, SOB even at rest, pulse 100-120)   Negative: [1] Headache AND [2] stiff neck (can't touch chin to chest)   Negative: Oxygen level (e.g., pulse oximetry) 90 percent or lower   Negative: Chest pain or pressure  (Exception: MILD central chest pain, present only when coughing)   Negative: Patient sounds very sick or weak to the triager    Protocols used: Coronavirus (COVID-19) Diagnosed or [id number]    "

## 2023-02-20 ENCOUNTER — PATIENT MESSAGE (OUTPATIENT)
Dept: ADMINISTRATIVE | Facility: HOSPITAL | Age: 68
End: 2023-02-20
Payer: MEDICARE

## 2023-02-20 ENCOUNTER — PATIENT MESSAGE (OUTPATIENT)
Dept: ADMINISTRATIVE | Facility: OTHER | Age: 68
End: 2023-02-20
Payer: MEDICARE

## 2023-02-20 ENCOUNTER — HOSPITAL ENCOUNTER (INPATIENT)
Facility: HOSPITAL | Age: 68
LOS: 14 days | Discharge: HOME | DRG: 698 | End: 2023-03-06
Attending: EMERGENCY MEDICINE | Admitting: HOSPITALIST
Payer: MEDICARE

## 2023-02-20 ENCOUNTER — TELEPHONE (OUTPATIENT)
Dept: PULMONOLOGY | Facility: CLINIC | Age: 68
End: 2023-02-20
Payer: MEDICARE

## 2023-02-20 ENCOUNTER — TELEPHONE (OUTPATIENT)
Dept: INTERNAL MEDICINE | Facility: CLINIC | Age: 68
End: 2023-02-20
Payer: MEDICARE

## 2023-02-20 ENCOUNTER — NURSE TRIAGE (OUTPATIENT)
Dept: ADMINISTRATIVE | Facility: CLINIC | Age: 68
End: 2023-02-20
Payer: MEDICARE

## 2023-02-20 DIAGNOSIS — Z71.89 ADVANCE CARE PLANNING: ICD-10-CM

## 2023-02-20 DIAGNOSIS — I48.91 ATRIAL FIBRILLATION: ICD-10-CM

## 2023-02-20 DIAGNOSIS — Z51.5 PALLIATIVE CARE ENCOUNTER: ICD-10-CM

## 2023-02-20 DIAGNOSIS — I47.20 V-TACH: ICD-10-CM

## 2023-02-20 DIAGNOSIS — R09.02 HYPOXIA: ICD-10-CM

## 2023-02-20 DIAGNOSIS — F17.210 CIGARETTE NICOTINE DEPENDENCE WITHOUT COMPLICATION: Primary | ICD-10-CM

## 2023-02-20 DIAGNOSIS — Z94.0 KIDNEY REPLACED BY TRANSPLANT: ICD-10-CM

## 2023-02-20 DIAGNOSIS — U07.1 COVID-19: ICD-10-CM

## 2023-02-20 DIAGNOSIS — R53.81 DEBILITY: ICD-10-CM

## 2023-02-20 DIAGNOSIS — Z71.89 GOALS OF CARE, COUNSELING/DISCUSSION: ICD-10-CM

## 2023-02-20 DIAGNOSIS — N17.9 ACUTE KIDNEY INJURY SUPERIMPOSED ON CKD: ICD-10-CM

## 2023-02-20 DIAGNOSIS — I50.32 CHRONIC HEART FAILURE WITH PRESERVED EJECTION FRACTION: ICD-10-CM

## 2023-02-20 DIAGNOSIS — Z94.0 H/O KIDNEY TRANSPLANT: ICD-10-CM

## 2023-02-20 DIAGNOSIS — R06.02 SOB (SHORTNESS OF BREATH): ICD-10-CM

## 2023-02-20 DIAGNOSIS — R06.02 SHORTNESS OF BREATH: ICD-10-CM

## 2023-02-20 DIAGNOSIS — Z79.60 LONG-TERM USE OF IMMUNOSUPPRESSANT MEDICATION: Primary | ICD-10-CM

## 2023-02-20 DIAGNOSIS — N18.9 ACUTE KIDNEY INJURY SUPERIMPOSED ON CKD: ICD-10-CM

## 2023-02-20 PROBLEM — L03.119 CELLULITIS OF FOOT: Status: ACTIVE | Noted: 2023-02-20

## 2023-02-20 LAB
ALBUMIN SERPL BCP-MCNC: 2.4 G/DL (ref 3.5–5.2)
ALP SERPL-CCNC: 72 U/L (ref 55–135)
ALT SERPL W/O P-5'-P-CCNC: 22 U/L (ref 10–44)
ANION GAP SERPL CALC-SCNC: 13 MMOL/L (ref 8–16)
AST SERPL-CCNC: 22 U/L (ref 10–40)
BASOPHILS # BLD AUTO: 0.01 K/UL (ref 0–0.2)
BASOPHILS NFR BLD: 0.1 % (ref 0–1.9)
BILIRUB SERPL-MCNC: 0.5 MG/DL (ref 0.1–1)
BNP SERPL-MCNC: 759 PG/ML (ref 0–99)
BUN SERPL-MCNC: 34 MG/DL (ref 8–23)
CALCIUM SERPL-MCNC: 7.3 MG/DL (ref 8.7–10.5)
CHLORIDE SERPL-SCNC: 104 MMOL/L (ref 95–110)
CK SERPL-CCNC: 150 U/L (ref 20–200)
CO2 SERPL-SCNC: 18 MMOL/L (ref 23–29)
CREAT SERPL-MCNC: 3.8 MG/DL (ref 0.5–1.4)
CRP SERPL-MCNC: 108 MG/L (ref 0–8.2)
DIFFERENTIAL METHOD: ABNORMAL
EOSINOPHIL # BLD AUTO: 0.1 K/UL (ref 0–0.5)
EOSINOPHIL NFR BLD: 1.2 % (ref 0–8)
ERYTHROCYTE [DISTWIDTH] IN BLOOD BY AUTOMATED COUNT: 15.6 % (ref 11.5–14.5)
EST. GFR  (NO RACE VARIABLE): 16.5 ML/MIN/1.73 M^2
FERRITIN SERPL-MCNC: 1911 NG/ML (ref 20–300)
GLUCOSE SERPL-MCNC: 110 MG/DL (ref 70–110)
HCT VFR BLD AUTO: 25 % (ref 40–54)
HGB BLD-MCNC: 7.6 G/DL (ref 14–18)
IMM GRANULOCYTES # BLD AUTO: 0.08 K/UL (ref 0–0.04)
IMM GRANULOCYTES NFR BLD AUTO: 1 % (ref 0–0.5)
LACTATE SERPL-SCNC: 1.9 MMOL/L (ref 0.5–2.2)
LYMPHOCYTES # BLD AUTO: 0.7 K/UL (ref 1–4.8)
LYMPHOCYTES NFR BLD: 8.4 % (ref 18–48)
MCH RBC QN AUTO: 25.2 PG (ref 27–31)
MCHC RBC AUTO-ENTMCNC: 30.4 G/DL (ref 32–36)
MCV RBC AUTO: 83 FL (ref 82–98)
MONOCYTES # BLD AUTO: 0.9 K/UL (ref 0.3–1)
MONOCYTES NFR BLD: 10.4 % (ref 4–15)
NEUTROPHILS # BLD AUTO: 6.6 K/UL (ref 1.8–7.7)
NEUTROPHILS NFR BLD: 78.9 % (ref 38–73)
NRBC BLD-RTO: 0 /100 WBC
PLATELET # BLD AUTO: 213 K/UL (ref 150–450)
PMV BLD AUTO: 11.4 FL (ref 9.2–12.9)
POTASSIUM SERPL-SCNC: 4.5 MMOL/L (ref 3.5–5.1)
PROT SERPL-MCNC: 7 G/DL (ref 6–8.4)
RBC # BLD AUTO: 3.01 M/UL (ref 4.6–6.2)
SODIUM SERPL-SCNC: 135 MMOL/L (ref 136–145)
TROPONIN I SERPL DL<=0.01 NG/ML-MCNC: 0.05 NG/ML (ref 0–0.03)
WBC # BLD AUTO: 8.34 K/UL (ref 3.9–12.7)

## 2023-02-20 PROCEDURE — 93010 EKG 12-LEAD: ICD-10-PCS | Mod: ,,, | Performed by: INTERNAL MEDICINE

## 2023-02-20 PROCEDURE — 25000003 PHARM REV CODE 250: Performed by: HOSPITALIST

## 2023-02-20 PROCEDURE — 86140 C-REACTIVE PROTEIN: CPT | Performed by: EMERGENCY MEDICINE

## 2023-02-20 PROCEDURE — 82550 ASSAY OF CK (CPK): CPT | Performed by: EMERGENCY MEDICINE

## 2023-02-20 PROCEDURE — 99222 1ST HOSP IP/OBS MODERATE 55: CPT | Mod: AI,,, | Performed by: HOSPITALIST

## 2023-02-20 PROCEDURE — 27000207 HC ISOLATION

## 2023-02-20 PROCEDURE — 82728 ASSAY OF FERRITIN: CPT | Performed by: EMERGENCY MEDICINE

## 2023-02-20 PROCEDURE — 93010 ELECTROCARDIOGRAM REPORT: CPT | Mod: ,,, | Performed by: INTERNAL MEDICINE

## 2023-02-20 PROCEDURE — 12000002 HC ACUTE/MED SURGE SEMI-PRIVATE ROOM

## 2023-02-20 PROCEDURE — 83605 ASSAY OF LACTIC ACID: CPT | Performed by: EMERGENCY MEDICINE

## 2023-02-20 PROCEDURE — 85025 COMPLETE CBC W/AUTO DIFF WBC: CPT | Performed by: EMERGENCY MEDICINE

## 2023-02-20 PROCEDURE — 99285 EMERGENCY DEPT VISIT HI MDM: CPT | Mod: 25

## 2023-02-20 PROCEDURE — 93005 ELECTROCARDIOGRAM TRACING: CPT

## 2023-02-20 PROCEDURE — 99222 PR INITIAL HOSPITAL CARE,LEVL II: ICD-10-PCS | Mod: AI,,, | Performed by: HOSPITALIST

## 2023-02-20 PROCEDURE — 80053 COMPREHEN METABOLIC PANEL: CPT | Performed by: EMERGENCY MEDICINE

## 2023-02-20 PROCEDURE — 83880 ASSAY OF NATRIURETIC PEPTIDE: CPT | Performed by: EMERGENCY MEDICINE

## 2023-02-20 PROCEDURE — 99285 PR EMERGENCY DEPT VISIT,LEVEL V: ICD-10-PCS | Mod: ,,, | Performed by: EMERGENCY MEDICINE

## 2023-02-20 PROCEDURE — 84484 ASSAY OF TROPONIN QUANT: CPT | Performed by: EMERGENCY MEDICINE

## 2023-02-20 PROCEDURE — 99285 EMERGENCY DEPT VISIT HI MDM: CPT | Mod: ,,, | Performed by: EMERGENCY MEDICINE

## 2023-02-20 PROCEDURE — 63600175 PHARM REV CODE 636 W HCPCS: Performed by: HOSPITALIST

## 2023-02-20 RX ORDER — ACETAMINOPHEN 500 MG
1000 TABLET ORAL ONCE
Status: COMPLETED | OUTPATIENT
Start: 2023-02-20 | End: 2023-02-20

## 2023-02-20 RX ORDER — NIFEDIPINE 30 MG/1
60 TABLET, EXTENDED RELEASE ORAL 2 TIMES DAILY
Status: DISCONTINUED | OUTPATIENT
Start: 2023-02-20 | End: 2023-03-06 | Stop reason: HOSPADM

## 2023-02-20 RX ORDER — PARICALCITOL 1 UG/1
1 CAPSULE, LIQUID FILLED ORAL DAILY
Status: DISCONTINUED | OUTPATIENT
Start: 2023-02-21 | End: 2023-03-06 | Stop reason: HOSPADM

## 2023-02-20 RX ORDER — CLONAZEPAM 0.5 MG/1
0.5 TABLET ORAL 2 TIMES DAILY PRN
Status: DISCONTINUED | OUTPATIENT
Start: 2023-02-20 | End: 2023-03-06 | Stop reason: HOSPADM

## 2023-02-20 RX ORDER — SODIUM CHLORIDE 9 MG/ML
INJECTION, SOLUTION INTRAVENOUS CONTINUOUS
Status: DISCONTINUED | OUTPATIENT
Start: 2023-02-20 | End: 2023-02-22

## 2023-02-20 RX ORDER — SODIUM CHLORIDE 0.9 % (FLUSH) 0.9 %
10 SYRINGE (ML) INJECTION
Status: CANCELLED | OUTPATIENT
Start: 2023-02-20

## 2023-02-20 RX ORDER — TACROLIMUS 1 MG/1
2 CAPSULE ORAL 2 TIMES DAILY
Status: DISCONTINUED | OUTPATIENT
Start: 2023-02-20 | End: 2023-02-25

## 2023-02-20 RX ORDER — CEPHALEXIN 500 MG/1
500 CAPSULE ORAL EVERY 8 HOURS
Status: DISCONTINUED | OUTPATIENT
Start: 2023-02-20 | End: 2023-02-27

## 2023-02-20 RX ORDER — ASPIRIN 81 MG/1
81 TABLET ORAL DAILY
Status: DISCONTINUED | OUTPATIENT
Start: 2023-02-21 | End: 2023-03-06 | Stop reason: HOSPADM

## 2023-02-20 RX ORDER — DOXAZOSIN 2 MG/1
4 TABLET ORAL EVERY 12 HOURS
Status: DISCONTINUED | OUTPATIENT
Start: 2023-02-20 | End: 2023-03-06 | Stop reason: HOSPADM

## 2023-02-20 RX ORDER — FLECAINIDE ACETATE 50 MG/1
50 TABLET ORAL EVERY 12 HOURS
Status: DISCONTINUED | OUTPATIENT
Start: 2023-02-20 | End: 2023-02-26

## 2023-02-20 RX ORDER — TALC
6 POWDER (GRAM) TOPICAL NIGHTLY PRN
Status: CANCELLED | OUTPATIENT
Start: 2023-02-20

## 2023-02-20 RX ORDER — ALBUTEROL SULFATE 90 UG/1
2 AEROSOL, METERED RESPIRATORY (INHALATION) EVERY 6 HOURS PRN
Status: DISCONTINUED | OUTPATIENT
Start: 2023-02-20 | End: 2023-03-06 | Stop reason: HOSPADM

## 2023-02-20 RX ORDER — PREDNISONE 5 MG/1
5 TABLET ORAL DAILY
Status: DISCONTINUED | OUTPATIENT
Start: 2023-02-21 | End: 2023-03-06 | Stop reason: HOSPADM

## 2023-02-20 RX ORDER — ATORVASTATIN CALCIUM 20 MG/1
40 TABLET, FILM COATED ORAL NIGHTLY
Status: DISCONTINUED | OUTPATIENT
Start: 2023-02-20 | End: 2023-03-06 | Stop reason: HOSPADM

## 2023-02-20 RX ORDER — GABAPENTIN 300 MG/1
300 CAPSULE ORAL 2 TIMES DAILY
Status: DISCONTINUED | OUTPATIENT
Start: 2023-02-20 | End: 2023-02-27

## 2023-02-20 RX ORDER — HYDRALAZINE HYDROCHLORIDE 25 MG/1
50 TABLET, FILM COATED ORAL 3 TIMES DAILY
Status: DISCONTINUED | OUTPATIENT
Start: 2023-02-20 | End: 2023-03-06 | Stop reason: HOSPADM

## 2023-02-20 RX ORDER — FLUTICASONE FUROATE AND VILANTEROL 100; 25 UG/1; UG/1
1 POWDER RESPIRATORY (INHALATION) DAILY
Status: DISCONTINUED | OUTPATIENT
Start: 2023-02-21 | End: 2023-03-06 | Stop reason: HOSPADM

## 2023-02-20 RX ORDER — FAMOTIDINE 20 MG/1
20 TABLET, FILM COATED ORAL DAILY
Status: DISCONTINUED | OUTPATIENT
Start: 2023-02-21 | End: 2023-03-06 | Stop reason: HOSPADM

## 2023-02-20 RX ADMIN — SODIUM CHLORIDE: 9 INJECTION, SOLUTION INTRAVENOUS at 06:02

## 2023-02-20 RX ADMIN — GABAPENTIN 300 MG: 300 CAPSULE ORAL at 09:02

## 2023-02-20 RX ADMIN — ATORVASTATIN CALCIUM 40 MG: 20 TABLET, FILM COATED ORAL at 09:02

## 2023-02-20 RX ADMIN — HYDRALAZINE HYDROCHLORIDE 50 MG: 25 TABLET, FILM COATED ORAL at 09:02

## 2023-02-20 RX ADMIN — TACROLIMUS 2 MG: 1 CAPSULE ORAL at 06:02

## 2023-02-20 RX ADMIN — APIXABAN 5 MG: 5 TABLET, FILM COATED ORAL at 09:02

## 2023-02-20 RX ADMIN — NIFEDIPINE 60 MG: 30 TABLET, FILM COATED, EXTENDED RELEASE ORAL at 09:02

## 2023-02-20 RX ADMIN — ACETAMINOPHEN 1000 MG: 500 TABLET ORAL at 09:02

## 2023-02-20 RX ADMIN — DOXAZOSIN 4 MG: 2 TABLET ORAL at 09:02

## 2023-02-20 RX ADMIN — FLECAINIDE ACETATE 50 MG: 50 TABLET ORAL at 09:02

## 2023-02-20 RX ADMIN — CEPHALEXIN 500 MG: 500 CAPSULE ORAL at 09:02

## 2023-02-20 NOTE — HPI
67-year-old black male transplant kidney patient being admitted for KATINA.  Patient was just discharged from a hospitalization for COVID pneumonia on 02/10/2023.  He also paid in ED visit a few days ago for what could have been a mild cellulitis/folliculitis on his foot for which he is almost done with his Keflex regimen.    Patient was instructed to come to the ED this time by the COVID surveillance nurses due to desaturation down to 88%.  When I talked with the patient and his wife in detail, they affirmed that he was not having any new or worsening shortness of breath since his discharge.  His wife did report that once the patient woke up/sat up his desaturation spontaneously resolved.  Patient denies any steven current shortness of breath or chest pain.  No nausea vomiting or flu fever like feeling.  In my discussion with the ED provider who was noted that the patient was complaining of shortness of breath but there was a concern that this likely was anxiety triggered.  Patient seems to be saturating well on room air here in the ED and chest x-ray which I personally reviewed is clear bilaterally.  Wife reports patient has been compliant with his medications.    The blood work did demonstrate an acutely elevated creatinine of 3.8 whereas his baseline is in the 2s.

## 2023-02-20 NOTE — ED TRIAGE NOTES
+ COVID 14 days ago. Reports SOB w/exertion. Denies c/p, abdominal pain, HA.     LOC: The patient is awake, alert and aware of environment with an appropriate affect, the patient is oriented x 3 and speaking appropriately.  APPEARANCE: Patient resting comfortably and in no acute distress, patient is clean and well groomed, patient's clothing is properly fastened.  SKIN: The skin is warm and dry, color consistent with ethnicity, patient has normal skin turgor and moist mucus membranes, skin intact, no breakdown or bruising noted.  MUSCULOSKELETAL: Patient moving all extremities spontaneously, no obvious swelling or deformities noted.  RESPIRATORY: Airway is open and patent, respirations are spontaneous, patient has a normal effort and rate, no accessory muscle use noted,  CARDIAC: Patient has a normal rate and regular rhythm, no periphreal edema noted, capillary refill < 3 seconds.  ABDOMEN: Soft and non tender to palpation, no distention noted, normoactive bowel sounds present in all four quadrants.  NEUROLOGIC:  facial expression is symmetrical, patient moving all extremities spontaneously, normal sensation in all extremities when touched with a finger.  Follows all commands appropriately.

## 2023-02-20 NOTE — ED PROVIDER NOTES
Chief Complaint   Shortness of Breath (Reports home O2 sat 88% COVID + 13 days ago.)      History Of Present Illness   Ibrahima Phelps is a 67 y.o. male presenting with shortness of breath that worsened this morning but has been present for the last few days.  The patient was diagnosed with COVID-19 on the 7th of this month, approximately 2 weeks ago.  His sats have been in the 94's for the past few days.  The COVID monitoring program told the patient and his wife that he needed to come to the hospital.    History obtained from: Patient and wife    Review of patient's allergies indicates:   Allergen Reactions    Ace inhibitors Swelling    Verapamil Other (See Comments)     Other reaction(s): Unknown    Povidone-iodine Itching       No current facility-administered medications on file prior to encounter.     Current Outpatient Medications on File Prior to Encounter   Medication Sig Dispense Refill    acetaminophen (TYLENOL) 500 MG tablet Take 1 tablet (500 mg total) by mouth every 6 (six) hours as needed for Pain. 20 tablet 0    albuterol (PROVENTIL) 2.5 mg /3 mL (0.083 %) nebulizer solution Take 3 mLs (2.5 mg total) by nebulization every 6 (six) hours as needed for Wheezing. Rescue 9 mL 6    albuterol (VENTOLIN HFA) 90 mcg/actuation inhaler Inhale 2 puffs into the lungs every 6 (six) hours as needed for Wheezing or Shortness of Breath. Rescue 18 g 3    ammonium lactate 12 % Crea Apply 1 g topically once daily. 140 g 1    aspirin (ECOTRIN) 81 MG EC tablet Take 1 tablet by mouth Daily.      atorvastatin (LIPITOR) 10 MG tablet Take 10 mg by mouth.      calcium carbonate (CALCIUM 600 ORAL) Take by mouth. Pt taking 1200 daily      cephALEXin (KEFLEX) 500 MG capsule Take 1 capsule (500 mg total) by mouth 4 (four) times daily. for 7 days 28 capsule 0    clonazePAM (KLONOPIN) 0.5 MG tablet Take 1 tablet (0.5 mg total) by mouth 2 (two) times daily as needed for Anxiety. (Patient not taking: Reported on 1/5/2023) 60 tablet 0     colchicine (MITIGARE) 0.6 mg Cap One po BID prn gout flare up to 3 days 15 capsule 11    cyclobenzaprine (FLEXERIL) 5 MG tablet Take 5 mg by mouth 2 (two) times daily as needed.      doxazosin (CARDURA) 4 MG tablet TAKE 1 TABLET EVERY 12 HOURS 180 tablet 3    ELIQUIS 5 mg Tab TAKE 1 TABLET BY MOUTH TWICE A  tablet 3    famotidine (PEPCID) 20 MG tablet TAKE 1 TABLET TWICE DAILY 180 tablet 3    fexofenadine (ALLEGRA) 60 MG tablet Take 1 tablet by mouth Twice daily as needed.      flecainide (TAMBOCOR) 50 MG Tab Take 1 tablet (50 mg total) by mouth every 12 (twelve) hours. 180 tablet 3    fluticasone propion-salmeterol 115-21 mcg/dose (ADVAIR HFA) 115-21 mcg/actuation HFAA inhaler Inhale 2 puffs into the lungs every 12 (twelve) hours. Controller 12 g 5    fluticasone propionate (FLONASE) 50 mcg/actuation nasal spray 1 spray (50 mcg total) by Each Nostril route once daily. 18.2 mL 0    furosemide (LASIX) 40 MG tablet Take 0.5 tablets (20 mg total) by mouth daily as needed (wt gain of 3 or more pounds in 1 day or 5 or more pounds in a week). 90 tablet 0    gabapentin (NEURONTIN) 300 MG capsule TAKE 1 CAPSULE BY MOUTH TWICE A  capsule 2    hydrALAZINE (APRESOLINE) 50 MG tablet TAKE 1 TABLET (50 MG TOTAL) BY MOUTH 3 (THREE) TIMES DAILY. 270 tablet 3    hyoscyamine (LEVSIN/SL) 0.125 mg Subl Place 1 tablet (0.125 mg total) under the tongue every 4 (four) hours as needed. (Patient not taking: Reported on 2/13/2023) 90 tablet 0    LIDOcaine (LIDODERM) 5 % Place 1 patch onto the skin daily as needed (back pain). Remove & Discard patch within 12 hours or as directed by MD 15 patch 0    metoprolol succinate (TOPROL-XL) 25 MG 24 hr tablet Take 1 tablet (25 mg total) by mouth once daily. 90 tablet 3    multivitamin (THERAGRAN) per tablet Take 1 tablet by mouth Daily.      NIFEdipine (PROCARDIA-XL) 60 MG (OSM) 24 hr tablet Take 1 tablet (60 mg total) by mouth 2 (two) times a day. 14 tablet 0    omeprazole (PRILOSEC) 20  MG capsule Take 1 capsule (20 mg total) by mouth once daily. 14 capsule 0    paricalcitoL (ZEMPLAR) 1 MCG capsule TAKE 1 CAPSULE ON MONDAY, WEDNESDAY AND FRIDAY 36 capsule 3    potassium chloride (KLOR-CON 10) 10 MEQ TbSR Take 1 tablet (10 mEq total) by mouth daily as needed (to be taken with lasix). 90 tablet 0    predniSONE (DELTASONE) 5 MG tablet TAKE 1 TABLET BY MOUTH EVERY DAY IN THE MORNING 90 tablet 3    pulse oximeter (PULSE OXIMETER) device by Apply Externally route 2 (two) times a day. Use twice daily at 8 AM and 3 PM and record the value in [employer] [place] [employer] as directed. 1 each 0    tacrolimus (PROGRAF) 0.5 MG Cap Take by mouth.      tacrolimus (PROGRAF) 1 MG Cap Two po in am and two po in pm 270 capsule 3    vitamin D 1000 units Tab Take 370 mg by mouth 2 (two) times daily. 2 tablets BID         Past History   As per HPI and below:  Past Medical History:   Diagnosis Date    (HFpEF) heart failure with preserved ejection fraction 9/25/2017    Atrial fibrillation     CAD (coronary artery disease)     CHF (congestive heart failure)     Chronic diastolic heart failure 4/8/2020    CKD (chronic kidney disease) stage 3, GFR 30-59 ml/min     Dr. Latif    CKD (chronic kidney disease) stage 3, GFR 30-59 ml/min     Diverticulosis     Fever blister     Heart attack 2006    Hyperlipidemia     Hypertension     Immunodeficiency due to treatment with immunosuppressive medication     Keloid cicatrix     Left lower quadrant abdominal pain 6/5/2022    Metabolic bone disease     Pericarditis     S/P kidney transplant     Supraventricular tachycardia 06/2019    Thrombocytopenia     Thyroid disease     Tobacco use 9/5/2014    Unspecified disorder of kidney and ureter     Stage IIICKD     Past Surgical History:   Procedure Laterality Date    CARDIOVERSION  04/30/15    CHOLECYSTECTOMY      COLONOSCOPY  April 20, 2011    Diverticulosis, repeat recommended in 3 yrs., repeat colonoscopy 2014 revealed 2 polyps.  He should return in 5  "years.    COLONOSCOPY N/A 3/13/2020    Procedure: COLONOSCOPY;  Surgeon: Chon Casper MD;  Location: [facility] (4TH FLR);  Service: Endoscopy;  Laterality: N/A;  ok to hold coumadin x5days-see telephone encounter 20-tb    COLONOSCOPY N/A 2021    Procedure: COLONOSCOPY;  Surgeon: Chon Casper MD;  Location: [facility] (4TH FLR);  Service: Endoscopy;  Laterality: N/A;  Eliquis - per Dr. GAVIN Blunt ok to hold x 2 days and "restarted asap"- ERW  last prep "poor", [id number] ordered/ Pt requests Dr. Casper  prep ins. mailed - COVID screening on 21 PCW- ERW    COLONOSCOPY N/A 3/3/2021    Procedure: COLONOSCOPY;  Surgeon: Chon Casper MD;  [facility]  Service: Endoscopy;  Laterality: N/A;  Patient with his second poor bowel pre and has poor prep today.  If patient not intersted or can't get colonoscopy tomorrow will need constipation bowel prep and will need to restart his Eliquis today.Thanks,Chon Blunt-ok to hold Eliquis 2 days prior      COVID     CORONARY ANGIOPLASTY WITH STENT PLACEMENT  2006    KIDNEY TRANSPLANT  2005    PARATHYROIDECTOMY      TREATMENT OF CARDIAC ARRHYTHMIA N/A 3/28/2022    Procedure: CARDIOVERSION;  Surgeon: Migue Blunt MD;  Location: 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     Patient is currently retired   Substance and Sexual Activity    Alcohol use: No    Drug use: No    Sexual activity: Yes     Partners: Female   Social History Narrative " "   Retired      Social Determinants of Health     Financial Resource Strain: Low Risk     Difficulty of Paying Living Expenses: Not hard at all   Food Insecurity: Unknown    Ran Out of Food in the Last Year: Never true   Transportation Needs: No Transportation Needs    Lack of Transportation (Medical): No    Lack of Transportation (Non-Medical): No   Stress: No Stress Concern Present    Feeling of Stress : Only a little   Social Connections: Unknown    Frequency of Communication with Friends and Family: More than three times a week    Frequency of Social Gatherings with Friends and Family: More than three times a week   Housing Stability: Unknown    Unable to Pay for Housing in the Last Year: No    Unstable Housing in the Last Year: No       Family History   Problem Relation Age of Onset    No Known Problems Sister     No Known Problems Brother     Thyroid disease Mother         s/p surgery    Heart disease Father         had pacemaker    No Known Problems Sister     Kidney failure Sister     Kidney disease Sister     No Known Problems Sister     Kidney disease Brother     Kidney failure Brother         s/p transplant    Diabetes Mellitus Neg Hx     Stroke Neg Hx     Heart attack Neg Hx     Cancer Neg Hx     Celiac disease Neg Hx     Cirrhosis Neg Hx     Colon cancer Neg Hx     Esophageal cancer Neg Hx     Inflammatory bowel disease Neg Hx     Rectal cancer Neg Hx     Stomach cancer Neg Hx     Ulcerative colitis Neg Hx     Liver disease Neg Hx     Liver cancer Neg Hx     Crohn's disease Neg Hx     Melanoma Neg Hx        Physical Exam     Vitals:    02/20/23 1158   BP: 137/77   Pulse: 87   Resp: (!) 26   Temp: 98.2 °F (36.8 °C)   TempSrc: Oral   SpO2: 95%  Comment: ambulatory   Weight: 101.6 kg (224 lb)   Height: 6' 1" (1.854 m)     Appearance:  Breathing heavily.  Skin:  No rashes.  Good turgor.  Head: Normocephalic.  Atraumatic.    Eyes: No conjunctival injection.  No swelling.  Chest: Increased work of " breathing.  No retractions.  No stridor.  Cardiovascular: Regular rhythm.  No appearance of shock.  No edema.  Abdomen:  No distention.    Musculoskeletal:  No deformities.  Normal range of motion.    Neurologic:  Normal movement and ambulation.  Mental Status:  Alert and oriented x 3.  Appropriate, conversant.      Initial MDM   COVID-19, 13 days since diagnosis, now with worsening shortness of breath.  Sats are 100 in the room when on room air when I saw him, but he was 88 earlier today.  Will do shortness of breath workup, given sats, will likely admit.    Medications Given   Medications - No data to display    Results and Course     Labs Reviewed   CBC W/ AUTO DIFFERENTIAL - Abnormal; Notable for the following components:       Result Value    RBC 3.01 (*)     Hemoglobin 7.6 (*)     Hematocrit 25.0 (*)     MCH 25.2 (*)     MCHC 30.4 (*)     RDW 15.6 (*)     Immature Granulocytes 1.0 (*)     Immature Grans (Abs) 0.08 (*)     Lymph # 0.7 (*)     Gran % 78.9 (*)     Lymph % 8.4 (*)     All other components within normal limits   COMPREHENSIVE METABOLIC PANEL - Abnormal; Notable for the following components:    Sodium 135 (*)     CO2 18 (*)     BUN 34 (*)     Creatinine 3.8 (*)     Calcium 7.3 (*)     Albumin 2.4 (*)     eGFR 16.5 (*)     All other components within normal limits    Narrative:     add on tacro per dr lee ann tabares /order#[id number] @ 02/20/2023  13:56    C-REACTIVE PROTEIN - Abnormal; Notable for the following components:    .0 (*)     All other components within normal limits   FERRITIN - Abnormal; Notable for the following components:    Ferritin 1,911 (*)     All other components within normal limits    Narrative:     add on tacro per dr lee ann tabares /order#[id number] @ 02/20/2023  13:56    TROPONIN I - Abnormal; Notable for the following components:    Troponin I 0.049 (*)     All other components within normal limits   B-TYPE NATRIURETIC PEPTIDE - Abnormal; Notable for the following  components:     (*)     All other components within normal limits   CK   LACTIC ACID, PLASMA   TACROLIMUS LEVEL   TACROLIMUS LEVEL       Imaging Results              X-Ray Chest AP Portable (Final result)  Result time 02/20/23 12:35:47      Final result by Bryon Nguyen MD (02/20/23 12:35:47)                   Impression:      See above      Electronically signed by: Bryon Nguyen MD  Date:    02/20/2023  Time:    12:35               Narrative:    EXAMINATION:  XR CHEST AP PORTABLE    CLINICAL HISTORY:  COVID-19;    TECHNIQUE:  Single frontal view of the chest was performed.    COMPARISON:  No 02/07/2023 ne    FINDINGS:  Heart size normal.  No significant airspace consolidation or pleural effusion identified                                      ED Course as of 02/20/23 1540   Mon Feb 20, 2023   1349 Lactate, Horace: 1.9 [DC]   1349 BNP(!): 759 [DC]   1349 WBC: 8.34 [DC]   1349 Hemoglobin(!): 7.6  Similar to previous [DC]   1349 Platelets: 213 [DC]   1407 Troponin I(!): 0.049  Similar to previous [DC]   1407 CRP(!): 108.0 [DC]   1408 Creatinine(!): 3.8  Increased from previous [DC]   1453 Ferritin(!): 1,911 [DC]      ED Course User Index  [DC] Lavon Disla MD           MDM, Impression and Plan   67 y.o. male with shortness of breath and hypoxia at home in the setting of ongoing COVID infection.  Also with KATINA, history of renal transplant.  Discussed with hospital medicine for admission.         Final diagnoses:  [R06.02] SOB (shortness of breath)  [U07.1] COVID-19        ED Disposition Condition    Admit Stable                  Lavon Disla MD  02/20/23 1540

## 2023-02-20 NOTE — TELEPHONE ENCOUNTER
----- Message from Amberly Bashir RN sent at 2/16/2023  3:52 PM CST -----  Regarding: LDCT  Hi,  This patient is due for annual LDCT scan.  Please place an order so pt can be scheduled.        Thanks  Amberly Bashir  LDCT Navigator

## 2023-02-20 NOTE — TELEPHONE ENCOUNTER
Called to schedule follow-up LDCT, spoke with pt's wife and confirmed schedulling of appointment 2/22 @ 3:30 pm.  Instructions given on where to go with verbalized understanding.

## 2023-02-20 NOTE — TELEPHONE ENCOUNTER
Escalation call, sat 94% listed in questionnaire. -       VM left for Pt explaining need to reach Pt for triage for abnormal vs listed on questionnaire and to call ooc at 8  for triage or if having a medical emergency to call 911 or go to the nearest ER.    [employer] message sent.    Pt was called back and he answered then put spouse on phone.  Pt c/o dizziness, sat 88%. Wife said he was really dizzy. Instructed her to have him sit up and call 911 at this time for sat 88% and dizziness. Covid protocol followed and pt's spouse advised to call 911 now for immediate medical attention. Spouse agrees to call 911 now at this time. Encounter routed to PCP/staff as high priority.   Reason for Disposition   Sounds like a life-threatening emergency to the triager    Protocols used: Coronavirus (COVID-19) Diagnosed or [id number]

## 2023-02-20 NOTE — H&P
[facility] - Emergency Dept  [facility] Medicine  History & Physical    Patient Name: Ibrahima Phelps  MRN: [id number]  Patient Class: IP- Inpatient  Admission Date: 2/20/2023  Attending Physician: Russ Vizcaino MD   Primary Care Provider: Connie Magdaleno MD         Patient information was obtained from patient and ER records.     Subjective:     Principal Problem:Acute-on-chronic kidney injury    Chief Complaint:   Chief Complaint   Patient presents with    Shortness of Breath     Reports home O2 sat 88% COVID + 13 days ago.        HPI: 67-year-old black male transplant kidney patient being admitted for KATINA.  Patient was just discharged from a hospitalization for COVID pneumonia on 02/10/2023.  He also paid in ED visit a few days ago for what could have been a mild cellulitis/folliculitis on his foot for which he is almost done with his Keflex regimen.    Patient was instructed to come to the ED this time by the COVID surveillance nurses due to desaturation down to 88%.  When I talked with the patient and his wife in detail, they affirmed that he was not having any new or worsening shortness of breath since his discharge.  His wife did report that once the patient woke up/sat up his desaturation spontaneously resolved.  Patient denies any steven current shortness of breath or chest pain.  No nausea vomiting or flu fever like feeling.  In my discussion with the ED provider who was noted that the patient was complaining of shortness of breath but there was a concern that this likely was anxiety triggered.  Patient seems to be saturating well on room air here in the ED and chest x-ray which I personally reviewed is clear bilaterally.  Wife reports patient has been compliant with his medications.    The blood work did demonstrate an acutely elevated creatinine of 3.8 whereas his baseline is in the 2s.      Past Medical History:   Diagnosis Date    (HFpEF) heart failure with preserved ejection fraction 9/25/2017    Atrial  "fibrillation     CAD (coronary artery disease)     CHF (congestive heart failure)     Chronic diastolic heart failure 4/8/2020    CKD (chronic kidney disease) stage 3, GFR 30-59 ml/min     Dr. Latif    CKD (chronic kidney disease) stage 3, GFR 30-59 ml/min     Diverticulosis     Fever blister     Heart attack 2006    Hyperlipidemia     Hypertension     Immunodeficiency due to treatment with immunosuppressive medication     Keloid cicatrix     Left lower quadrant abdominal pain 6/5/2022    Metabolic bone disease     Pericarditis     S/P kidney transplant     Supraventricular tachycardia 06/2019    Thrombocytopenia     Thyroid disease     Tobacco use 9/5/2014    Unspecified disorder of kidney and ureter     Stage IIICKD       Past Surgical History:   Procedure Laterality Date    CARDIOVERSION  04/30/15    CHOLECYSTECTOMY      COLONOSCOPY  April 20, 2011    Diverticulosis, repeat recommended in 3 yrs., repeat colonoscopy 2014 revealed 2 polyps.  He should return in 5 years.    COLONOSCOPY N/A 3/13/2020    Procedure: COLONOSCOPY;  Surgeon: Chon Casper MD;  Location: [facility] ([facility] FLR);  Service: Endoscopy;  Laterality: N/A;  ok to hold coumadin x5days-see telephone encounter 2/4/20-tb    COLONOSCOPY N/A 2/4/2021    Procedure: COLONOSCOPY;  Surgeon: Chon Casper MD;  Location: [facility] (4TH FLR);  Service: Endoscopy;  Laterality: N/A;  Eliquis - per Dr. GAVIN Blunt ok to hold x 2 days and "restarted asap"- ERW  last prep "poor", [id number] ordered/ Pt requests Dr. Casper  prep ins. mailed - COVID screening on 2/1/21 PCW- ERW    COLONOSCOPY N/A 3/3/2021    Procedure: COLONOSCOPY;  Surgeon: Chon Casper MD;  Location: [facility] (4TH FLR);  Service: Endoscopy;  Laterality: N/A;  Patient with his second poor bowel pre and has poor prep today.  If patient not intersted or can't get colonoscopy tomorrow will need constipation bowel prep and will need to restart his Eliquis " today.Thanks,Chon     per Dr Blunt-ok to hold Eliquis 2 days prior      COVID     CORONARY ANGIOPLASTY WITH STENT PLACEMENT  9/13/2006    KIDNEY TRANSPLANT  2005    PARATHYROIDECTOMY      TREATMENT OF CARDIAC ARRHYTHMIA N/A 3/28/2022    Procedure: CARDIOVERSION;  Surgeon: Migue Blunt MD;  Location: [facility] EP LAB;  Service: Cardiology;  Laterality: N/A;  AF, DCC, MAC, GP, 3 PREP*RODRIGO deferred pt 100% compliant*       Review of patient's allergies indicates:   Allergen Reactions    Ace inhibitors Swelling    Verapamil Other (See Comments)     Other reaction(s): Unknown    Povidone-iodine Itching       No current facility-administered medications on file prior to encounter.     Current Outpatient Medications on File Prior to Encounter   Medication Sig    acetaminophen (TYLENOL) 500 MG tablet Take 1 tablet (500 mg total) by mouth every 6 (six) hours as needed for Pain.    albuterol (PROVENTIL) 2.5 mg /3 mL (0.083 %) nebulizer solution Take 3 mLs (2.5 mg total) by nebulization every 6 (six) hours as needed for Wheezing. Rescue    albuterol (VENTOLIN HFA) 90 mcg/actuation inhaler Inhale 2 puffs into the lungs every 6 (six) hours as needed for Wheezing or Shortness of Breath. Rescue    ammonium lactate 12 % Crea Apply 1 g topically once daily.    aspirin (ECOTRIN) 81 MG EC tablet Take 1 tablet by mouth Daily.    atorvastatin (LIPITOR) 10 MG tablet Take 10 mg by mouth.    calcium carbonate (CALCIUM 600 ORAL) Take by mouth. Pt taking 1200 daily    cephALEXin (KEFLEX) 500 MG capsule Take 1 capsule (500 mg total) by mouth 4 (four) times daily. for 7 days    clonazePAM (KLONOPIN) 0.5 MG tablet Take 1 tablet (0.5 mg total) by mouth 2 (two) times daily as needed for Anxiety. (Patient not taking: Reported on 1/5/2023)    colchicine (MITIGARE) 0.6 mg Cap One po BID prn gout flare up to 3 days    cyclobenzaprine (FLEXERIL) 5 MG tablet Take 5 mg by mouth 2 (two) times daily as needed.    doxazosin (CARDURA) 4 MG  tablet TAKE 1 TABLET EVERY 12 HOURS    ELIQUIS 5 mg Tab TAKE 1 TABLET BY MOUTH TWICE A DAY    famotidine (PEPCID) 20 MG tablet TAKE 1 TABLET TWICE DAILY    fexofenadine (ALLEGRA) 60 MG tablet Take 1 tablet by mouth Twice daily as needed.    flecainide (TAMBOCOR) 50 MG Tab Take 1 tablet (50 mg total) by mouth every 12 (twelve) hours.    fluticasone propion-salmeterol 115-21 mcg/dose (ADVAIR HFA) 115-21 mcg/actuation HFAA inhaler Inhale 2 puffs into the lungs every 12 (twelve) hours. Controller    fluticasone propionate (FLONASE) 50 mcg/actuation nasal spray 1 spray (50 mcg total) by Each Nostril route once daily.    furosemide (LASIX) 40 MG tablet Take 0.5 tablets (20 mg total) by mouth daily as needed (wt gain of 3 or more pounds in 1 day or 5 or more pounds in a week).    gabapentin (NEURONTIN) 300 MG capsule TAKE 1 CAPSULE BY MOUTH TWICE A DAY    hydrALAZINE (APRESOLINE) 50 MG tablet TAKE 1 TABLET (50 MG TOTAL) BY MOUTH 3 (THREE) TIMES DAILY.    hyoscyamine (LEVSIN/SL) 0.125 mg Subl Place 1 tablet (0.125 mg total) under the tongue every 4 (four) hours as needed. (Patient not taking: Reported on 2/13/2023)    LIDOcaine (LIDODERM) 5 % Place 1 patch onto the skin daily as needed (back pain). Remove & Discard patch within 12 hours or as directed by MD    metoprolol succinate (TOPROL-XL) 25 MG 24 hr tablet Take 1 tablet (25 mg total) by mouth once daily.    multivitamin (THERAGRAN) per tablet Take 1 tablet by mouth Daily.    NIFEdipine (PROCARDIA-XL) 60 MG (OSM) 24 hr tablet Take 1 tablet (60 mg total) by mouth 2 (two) times a day.    omeprazole (PRILOSEC) 20 MG capsule Take 1 capsule (20 mg total) by mouth once daily.    paricalcitoL (ZEMPLAR) 1 MCG capsule TAKE 1 CAPSULE ON MONDAY, WEDNESDAY AND FRIDAY    potassium chloride (KLOR-CON 10) 10 MEQ TbSR Take 1 tablet (10 mEq total) by mouth daily as needed (to be taken with lasix).    predniSONE (DELTASONE) 5 MG tablet TAKE 1 TABLET BY MOUTH EVERY DAY  IN THE MORNING    pulse oximeter (PULSE OXIMETER) device by Apply Externally route 2 (two) times a day. Use twice daily at 8 AM and 3 PM and record the value in MyChart as directed.    tacrolimus (PROGRAF) 0.5 MG Cap Take by mouth.    tacrolimus (PROGRAF) 1 MG Cap Two po in am and two po in pm    vitamin D 1000 units Tab Take 370 mg by mouth 2 (two) times daily. 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     Patient is currently retired   Substance and Sexual Activity    Alcohol use: No    Drug use: No    Sexual activity: Yes     Partners: Female     Review of Systems  Objective:     Vital Signs (Most Recent):  Temp: 98 °F (36.7 °C) (23 1632)  Pulse: 69 (23 1632)  Resp: (!) 22 (23 1632)  BP: 118/64 (23 1632)  SpO2: 95 % (23 1632)   Vital Signs (24h Range):  Temp:  [98 °F (36.7 °C)-98.2 °F (36.8 °C)] 98 °F (36.7 °C)  Pulse:  [69-87] 69  Resp:  [22-26] 22  SpO2:  [95 %] 95 %  BP: (118-137)/(64-77) 118/64     Weight: 101.6 kg (224 lb)  Body mass index is 29.55 kg/m².    Physical Exam  Vitals reviewed.          General: well nourished, nontoxic appearing  Skin: Warm and dry  Eyes: No conjunctivitis, no scleral icterus  ENT: No nasal bleeding, no obvious discharge  Cardiovascular: Regular rate and rhythm. No obvious JVD  Pulmonary/Chest: No accessory muscle use. Clear to auscultation bilaterally, N95 mask in place, no cyanosis  Gastrointestinal: Abdomen soft, NT,  ND.  Extremities: no clubbing, cyanosis or edema  Musculoskeletal: appropriate muscle bulk, intact movement of extremities  Neurological: no facial droop, no slurred speech  Psychiatric: appropriate mood and affect, insight intact          Assessment/Plan:     * Acute-on-chronic kidney injury  Baseline seems to be a creatinine in the 2s   Gentle IV fluids, recheck in a.m.  See below for transplant treatment      H/O kidney transplant  K transplant service consulted   Taking Prograf level in a.m.   Continue home Prograf 2 mg b.i.d.  Continue home prednisone      Cellulitis of foot  Finish regimen of cephalex in a few days      Anemia due to stage 4 chronic kidney disease  Stable, monitor      Chronic diastolic heart failure  Clinically euvolemic   Hold off on home dosing of p.r.n. Lasix given KATINA    Paroxysmal atrial fibrillation  Continue home Eliquis, flecainide, metoprolol        CAD (coronary artery disease)  History of stenting years ago   Continue home aspirin and statin, Toprol      Hypertension  Continue home metoprolol, nifedipine, hydralazine      VTE Risk Mitigation (From admission, onward)         Ordered     apixaban tablet 5 mg  2 times daily         02/20/23 1720              I don't suspect acute covid; patient has no sustained ongoing respiratory symptoms; no need for isolation. 13 days out.      Russ Vizcaino MD  Department of Hospital Medicine   [facility] - Emergency Dept

## 2023-02-20 NOTE — ASSESSMENT & PLAN NOTE
Baseline seems to be a creatinine in the 2s   Gentle IV fluids, recheck in a.m.  See below for transplant treatment

## 2023-02-20 NOTE — ASSESSMENT & PLAN NOTE
K transplant service consulted   Taking Prograf level in a.m.   Continue home Prograf 2 mg b.i.d.  Continue home prednisone

## 2023-02-20 NOTE — SUBJECTIVE & OBJECTIVE
"Past Medical History:   Diagnosis Date    (HFpEF) heart failure with preserved ejection fraction 9/25/2017    Atrial fibrillation     CAD (coronary artery disease)     CHF (congestive heart failure)     Chronic diastolic heart failure 4/8/2020    CKD (chronic kidney disease) stage 3, GFR 30-59 ml/min     Dr. Latif    CKD (chronic kidney disease) stage 3, GFR 30-59 ml/min     Diverticulosis     Fever blister     Heart attack 2006    Hyperlipidemia     Hypertension     Immunodeficiency due to treatment with immunosuppressive medication     Keloid cicatrix     Left lower quadrant abdominal pain 6/5/2022    Metabolic bone disease     Pericarditis     S/P kidney transplant     Supraventricular tachycardia 06/2019    Thrombocytopenia     Thyroid disease     Tobacco use 9/5/2014    Unspecified disorder of kidney and ureter     Stage IIICKD       Past Surgical History:   Procedure Laterality Date    CARDIOVERSION  04/30/15    CHOLECYSTECTOMY      COLONOSCOPY  April 20, 2011    Diverticulosis, repeat recommended in 3 yrs., repeat colonoscopy 2014 revealed 2 polyps.  He should return in 5 years.    COLONOSCOPY N/A 3/13/2020    Procedure: COLONOSCOPY;  Surgeon: Chon Casper MD;  Location: [facility] (4TH FLR);  Service: Endoscopy;  Laterality: N/A;  ok to hold coumadin x5days-see telephone encounter 2/4/20-tb    COLONOSCOPY N/A 2/4/2021    Procedure: COLONOSCOPY;  Surgeon: Chon Casper MD;  Location: [facility] (4TH FLR);  Service: Endoscopy;  Laterality: N/A;  Eliquis - per Dr. GAVIN Blunt ok to hold x 2 days and "restarted asap"- ERW  last prep "poor", [id number] ordered/ Pt requests Dr. Casper  prep ins. mailed - COVID screening on 2/1/21 PCW- ERW    COLONOSCOPY N/A 3/3/2021    Procedure: COLONOSCOPY;  Surgeon: Chon Casper MD;  Location: [facility] (4TH FLR);  Service: Endoscopy;  Laterality: N/A;  Patient with his second poor bowel pre and has poor prep today.  If patient not intersted or can't get colonoscopy " tomorrow will need constipation bowel prep and will need to restart his Eliquis today.Thanks,Chon     per Dr Blunt-ok to hold Eliquis 2 days prior      COVID     CORONARY ANGIOPLASTY WITH STENT PLACEMENT  9/13/2006    KIDNEY TRANSPLANT  2005    PARATHYROIDECTOMY      TREATMENT OF CARDIAC ARRHYTHMIA N/A 3/28/2022    Procedure: CARDIOVERSION;  Surgeon: Migue Blunt MD;  Location: [facility] LAB;  Service: Cardiology;  Laterality: N/A;  AF, DCC, MAC, GP, 3 PREP*RODRIGO deferred pt 100% compliant*       Review of patient's allergies indicates:   Allergen Reactions    Ace inhibitors Swelling    Verapamil Other (See Comments)     Other reaction(s): Unknown    Povidone-iodine Itching       No current facility-administered medications on file prior to encounter.     Current Outpatient Medications on File Prior to Encounter   Medication Sig    acetaminophen (TYLENOL) 500 MG tablet Take 1 tablet (500 mg total) by mouth every 6 (six) hours as needed for Pain.    albuterol (PROVENTIL) 2.5 mg /3 mL (0.083 %) nebulizer solution Take 3 mLs (2.5 mg total) by nebulization every 6 (six) hours as needed for Wheezing. Rescue    albuterol (VENTOLIN HFA) 90 mcg/actuation inhaler Inhale 2 puffs into the lungs every 6 (six) hours as needed for Wheezing or Shortness of Breath. Rescue    ammonium lactate 12 % Crea Apply 1 g topically once daily.    aspirin (ECOTRIN) 81 MG EC tablet Take 1 tablet by mouth Daily.    atorvastatin (LIPITOR) 10 MG tablet Take 10 mg by mouth.    calcium carbonate (CALCIUM 600 ORAL) Take by mouth. Pt taking 1200 daily    cephALEXin (KEFLEX) 500 MG capsule Take 1 capsule (500 mg total) by mouth 4 (four) times daily. for 7 days    clonazePAM (KLONOPIN) 0.5 MG tablet Take 1 tablet (0.5 mg total) by mouth 2 (two) times daily as needed for Anxiety. (Patient not taking: Reported on 1/5/2023)    colchicine (MITIGARE) 0.6 mg Cap One po BID prn gout flare up to 3 days    cyclobenzaprine (FLEXERIL) 5 MG tablet Take 5 mg by mouth  2 (two) times daily as needed.    doxazosin (CARDURA) 4 MG tablet TAKE 1 TABLET EVERY 12 HOURS    ELIQUIS 5 mg Tab TAKE 1 TABLET BY MOUTH TWICE A DAY    famotidine (PEPCID) 20 MG tablet TAKE 1 TABLET TWICE DAILY    fexofenadine (ALLEGRA) 60 MG tablet Take 1 tablet by mouth Twice daily as needed.    flecainide (TAMBOCOR) 50 MG Tab Take 1 tablet (50 mg total) by mouth every 12 (twelve) hours.    fluticasone propion-salmeterol 115-21 mcg/dose (ADVAIR HFA) 115-21 mcg/actuation HFAA inhaler Inhale 2 puffs into the lungs every 12 (twelve) hours. Controller    fluticasone propionate (FLONASE) 50 mcg/actuation nasal spray 1 spray (50 mcg total) by Each Nostril route once daily.    furosemide (LASIX) 40 MG tablet Take 0.5 tablets (20 mg total) by mouth daily as needed (wt gain of 3 or more pounds in 1 day or 5 or more pounds in a week).    gabapentin (NEURONTIN) 300 MG capsule TAKE 1 CAPSULE BY MOUTH TWICE A DAY    hydrALAZINE (APRESOLINE) 50 MG tablet TAKE 1 TABLET (50 MG TOTAL) BY MOUTH 3 (THREE) TIMES DAILY.    hyoscyamine (LEVSIN/SL) 0.125 mg Subl Place 1 tablet (0.125 mg total) under the tongue every 4 (four) hours as needed. (Patient not taking: Reported on 2/13/2023)    LIDOcaine (LIDODERM) 5 % Place 1 patch onto the skin daily as needed (back pain). Remove & Discard patch within 12 hours or as directed by MD    metoprolol succinate (TOPROL-XL) 25 MG 24 hr tablet Take 1 tablet (25 mg total) by mouth once daily.    multivitamin (THERAGRAN) per tablet Take 1 tablet by mouth Daily.    NIFEdipine (PROCARDIA-XL) 60 MG (OSM) 24 hr tablet Take 1 tablet (60 mg total) by mouth 2 (two) times a day.    omeprazole (PRILOSEC) 20 MG capsule Take 1 capsule (20 mg total) by mouth once daily.    paricalcitoL (ZEMPLAR) 1 MCG capsule TAKE 1 CAPSULE ON MONDAY, WEDNESDAY AND FRIDAY    potassium chloride (KLOR-CON 10) 10 MEQ TbSR Take 1 tablet (10 mEq total) by mouth daily as needed (to be taken with lasix).    predniSONE (DELTASONE) 5 MG  tablet TAKE 1 TABLET BY MOUTH EVERY DAY IN THE MORNING    pulse oximeter (PULSE OXIMETER) device by Apply Externally route 2 (two) times a day. Use twice daily at 8 AM and 3 PM and record the value in MyChart as directed.    tacrolimus (PROGRAF) 0.5 MG Cap Take by mouth.    tacrolimus (PROGRAF) 1 MG Cap Two po in am and two po in pm    vitamin D 1000 units Tab Take 370 mg by mouth 2 (two) times daily. 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     Patient is currently retired   Substance and Sexual Activity    Alcohol use: No    Drug use: No    Sexual activity: Yes     Partners: Female     Review of Systems  Objective:     Vital Signs (Most Recent):  Temp: 98 °F (36.7 °C) (23 1632)  Pulse: 69 (23 1632)  Resp: (!) 22 (23 1632)  BP: 118/64 (23 1632)  SpO2: 95 % (23 1632)   Vital Signs (24h Range):  Temp:  [98 °F (36.7 °C)-98.2 °F (36.8 °C)] 98 °F (36.7 °C)  Pulse:  [69-87] 69  Resp:  [22-26] 22  SpO2:  [95 %] 95 %  BP: (118-137)/(64-77) 118/64     Weight: 101.6 kg (224 lb)  Body mass index is 29.55 kg/m².    Physical Exam  Vitals reviewed.          General: well nourished, nontoxic appearing  Skin: Warm and dry  Eyes: No conjunctivitis, no scleral icterus  ENT: No nasal bleeding, no obvious discharge  Cardiovascular: Regular rate and rhythm. No obvious JVD  Pulmonary/Chest: No accessory muscle use. Clear to auscultation bilaterally, N95 mask in place, no cyanosis  Gastrointestinal:  Abdomen soft, NT, ND.  Extremities: no clubbing, cyanosis or edema  Musculoskeletal: appropriate muscle bulk, intact movement of extremities  Neurological: no facial droop, no slurred speech  Psychiatric: appropriate mood and affect, insight intact

## 2023-02-20 NOTE — PROGRESS NOTES
Pharmacist Renal Dose Adjustment Note    Ibrahima Phelps is a 67 y.o. male being treated with famotidine 20 mg daily ( home med)    Patient Data:    Vital Signs (Most Recent):  Temp: 98 °F (36.7 °C) (02/20/23 1632)  Pulse: 69 (02/20/23 1632)  Resp: (!) 22 (02/20/23 1632)  BP: 118/64 (02/20/23 1632)  SpO2: 95 % (02/20/23 1632)   Vital Signs (72h Range):  Temp:  [98 °F (36.7 °C)-98.2 °F (36.8 °C)]   Pulse:  [69-87]   Resp:  [22-26]   BP: (118-137)/(64-77)   SpO2:  [95 %]      Recent Labs   Lab 02/20/23  1233   CREATININE 3.8*     Serum creatinine: 3.8 mg/dL (H) 02/20/23 1233  Estimated creatinine clearance: 23.3 mL/min (A)    Adjusted to   Famotidine 20 mg daily, per pharmacy protocol    Pharmacist's Name: Naz Wolf  Pharmacist's Extension: [id number]

## 2023-02-21 PROBLEM — R06.09 CHRONIC DYSPNEA: Status: ACTIVE | Noted: 2023-02-21

## 2023-02-21 LAB
ANION GAP SERPL CALC-SCNC: 10 MMOL/L (ref 8–16)
BACTERIA #/AREA URNS AUTO: NORMAL /HPF
BASOPHILS # BLD AUTO: 0.01 K/UL (ref 0–0.2)
BASOPHILS NFR BLD: 0.1 % (ref 0–1.9)
BILIRUB UR QL STRIP: NEGATIVE
BUN SERPL-MCNC: 38 MG/DL (ref 8–23)
CALCIUM SERPL-MCNC: 6.9 MG/DL (ref 8.7–10.5)
CHLORIDE SERPL-SCNC: 107 MMOL/L (ref 95–110)
CLARITY UR REFRACT.AUTO: CLEAR
CO2 SERPL-SCNC: 20 MMOL/L (ref 23–29)
COLOR UR AUTO: YELLOW
CREAT SERPL-MCNC: 3.7 MG/DL (ref 0.5–1.4)
DIFFERENTIAL METHOD: ABNORMAL
EOSINOPHIL # BLD AUTO: 0.1 K/UL (ref 0–0.5)
EOSINOPHIL NFR BLD: 1.7 % (ref 0–8)
ERYTHROCYTE [DISTWIDTH] IN BLOOD BY AUTOMATED COUNT: 15.4 % (ref 11.5–14.5)
EST. GFR  (NO RACE VARIABLE): 17.1 ML/MIN/1.73 M^2
GLUCOSE SERPL-MCNC: 85 MG/DL (ref 70–110)
GLUCOSE UR QL STRIP: NEGATIVE
HCT VFR BLD AUTO: 25.2 % (ref 40–54)
HGB BLD-MCNC: 7.6 G/DL (ref 14–18)
HGB UR QL STRIP: NEGATIVE
HYALINE CASTS UR QL AUTO: 0 /LPF
IMM GRANULOCYTES # BLD AUTO: 0.06 K/UL (ref 0–0.04)
IMM GRANULOCYTES NFR BLD AUTO: 0.8 % (ref 0–0.5)
KETONES UR QL STRIP: NEGATIVE
LEUKOCYTE ESTERASE UR QL STRIP: NEGATIVE
LYMPHOCYTES # BLD AUTO: 0.7 K/UL (ref 1–4.8)
LYMPHOCYTES NFR BLD: 10.4 % (ref 18–48)
MCH RBC QN AUTO: 24.1 PG (ref 27–31)
MCHC RBC AUTO-ENTMCNC: 30.2 G/DL (ref 32–36)
MCV RBC AUTO: 80 FL (ref 82–98)
MICROSCOPIC COMMENT: NORMAL
MONOCYTES # BLD AUTO: 0.7 K/UL (ref 0.3–1)
MONOCYTES NFR BLD: 9.1 % (ref 4–15)
NEUTROPHILS # BLD AUTO: 5.5 K/UL (ref 1.8–7.7)
NEUTROPHILS NFR BLD: 77.9 % (ref 38–73)
NITRITE UR QL STRIP: NEGATIVE
NRBC BLD-RTO: 0 /100 WBC
PH UR STRIP: 6 [PH] (ref 5–8)
PLATELET # BLD AUTO: 198 K/UL (ref 150–450)
PMV BLD AUTO: 11.3 FL (ref 9.2–12.9)
POTASSIUM SERPL-SCNC: 4.3 MMOL/L (ref 3.5–5.1)
PROT UR QL STRIP: ABNORMAL
RBC # BLD AUTO: 3.16 M/UL (ref 4.6–6.2)
RBC #/AREA URNS AUTO: 1 /HPF (ref 0–4)
SODIUM SERPL-SCNC: 137 MMOL/L (ref 136–145)
SP GR UR STRIP: 1.01 (ref 1–1.03)
SQUAMOUS #/AREA URNS AUTO: 0 /HPF
TACROLIMUS BLD-MCNC: 6.7 NG/ML (ref 5–15)
TACROLIMUS BLD-MCNC: 6.8 NG/ML (ref 5–15)
URN SPEC COLLECT METH UR: ABNORMAL
WBC # BLD AUTO: 7.11 K/UL (ref 3.9–12.7)
WBC #/AREA URNS AUTO: 1 /HPF (ref 0–5)

## 2023-02-21 PROCEDURE — 25000242 PHARM REV CODE 250 ALT 637 W/ HCPCS: Performed by: HOSPITALIST

## 2023-02-21 PROCEDURE — 80197 ASSAY OF TACROLIMUS: CPT | Mod: 91 | Performed by: HOSPITALIST

## 2023-02-21 PROCEDURE — 25000003 PHARM REV CODE 250: Performed by: HOSPITALIST

## 2023-02-21 PROCEDURE — 80048 BASIC METABOLIC PNL TOTAL CA: CPT | Performed by: HOSPITALIST

## 2023-02-21 PROCEDURE — 85025 COMPLETE CBC W/AUTO DIFF WBC: CPT | Performed by: HOSPITALIST

## 2023-02-21 PROCEDURE — 99233 SBSQ HOSP IP/OBS HIGH 50: CPT | Mod: 95,,, | Performed by: HOSPITALIST

## 2023-02-21 PROCEDURE — 12000002 HC ACUTE/MED SURGE SEMI-PRIVATE ROOM

## 2023-02-21 PROCEDURE — 36415 COLL VENOUS BLD VENIPUNCTURE: CPT | Performed by: HOSPITALIST

## 2023-02-21 PROCEDURE — 99233 PR SUBSEQUENT HOSPITAL CARE,LEVL III: ICD-10-PCS | Mod: 95,,, | Performed by: HOSPITALIST

## 2023-02-21 PROCEDURE — 80197 ASSAY OF TACROLIMUS: CPT | Performed by: EMERGENCY MEDICINE

## 2023-02-21 PROCEDURE — 81001 URINALYSIS AUTO W/SCOPE: CPT | Performed by: INTERNAL MEDICINE

## 2023-02-21 PROCEDURE — 63600175 PHARM REV CODE 636 W HCPCS: Performed by: HOSPITALIST

## 2023-02-21 RX ADMIN — HYDRALAZINE HYDROCHLORIDE 50 MG: 25 TABLET, FILM COATED ORAL at 09:02

## 2023-02-21 RX ADMIN — FLECAINIDE ACETATE 50 MG: 50 TABLET ORAL at 09:02

## 2023-02-21 RX ADMIN — GABAPENTIN 300 MG: 300 CAPSULE ORAL at 09:02

## 2023-02-21 RX ADMIN — PARICALCITOL 1 MCG: 1 CAPSULE, LIQUID FILLED ORAL at 09:02

## 2023-02-21 RX ADMIN — ATORVASTATIN CALCIUM 40 MG: 20 TABLET, FILM COATED ORAL at 09:02

## 2023-02-21 RX ADMIN — HYDRALAZINE HYDROCHLORIDE 50 MG: 25 TABLET, FILM COATED ORAL at 03:02

## 2023-02-21 RX ADMIN — CLONAZEPAM 0.5 MG: 0.5 TABLET ORAL at 09:02

## 2023-02-21 RX ADMIN — DOXAZOSIN 4 MG: 2 TABLET ORAL at 09:02

## 2023-02-21 RX ADMIN — APIXABAN 5 MG: 5 TABLET, FILM COATED ORAL at 09:02

## 2023-02-21 RX ADMIN — NIFEDIPINE 60 MG: 30 TABLET, FILM COATED, EXTENDED RELEASE ORAL at 09:02

## 2023-02-21 RX ADMIN — CEPHALEXIN 500 MG: 500 CAPSULE ORAL at 03:02

## 2023-02-21 RX ADMIN — TACROLIMUS 2 MG: 1 CAPSULE ORAL at 09:02

## 2023-02-21 RX ADMIN — METOPROLOL SUCCINATE 25 MG: 25 TABLET, EXTENDED RELEASE ORAL at 09:02

## 2023-02-21 RX ADMIN — FAMOTIDINE 20 MG: 20 TABLET ORAL at 09:02

## 2023-02-21 RX ADMIN — CEPHALEXIN 500 MG: 500 CAPSULE ORAL at 05:02

## 2023-02-21 RX ADMIN — CEPHALEXIN 500 MG: 500 CAPSULE ORAL at 09:02

## 2023-02-21 RX ADMIN — SODIUM CHLORIDE: 9 INJECTION, SOLUTION INTRAVENOUS at 10:02

## 2023-02-21 RX ADMIN — FLUTICASONE FUROATE AND VILANTEROL TRIFENATATE 1 PUFF: 100; 25 POWDER RESPIRATORY (INHALATION) at 09:02

## 2023-02-21 RX ADMIN — ASPIRIN 81 MG: 81 TABLET, COATED ORAL at 09:02

## 2023-02-21 RX ADMIN — PREDNISONE 5 MG: 5 TABLET ORAL at 09:02

## 2023-02-21 RX ADMIN — SODIUM CHLORIDE: 9 INJECTION, SOLUTION INTRAVENOUS at 08:02

## 2023-02-21 RX ADMIN — TACROLIMUS 2 MG: 1 CAPSULE ORAL at 07:02

## 2023-02-21 NOTE — NURSING
Pt arrived to the unit at 1800 via stretcher. Wife is at bedside. Pt is able to transfer from stretcher to bed indep. Pt is A&Ox4 and able to make needs known. VSS. Oriented to the room. Dinner tray arrived and pt ate dinner. C/o headache. RN notified MD to debora fpr analgesics. Tyl to be ordered per MD.

## 2023-02-21 NOTE — ASSESSMENT & PLAN NOTE
K transplant service following  Routine prograf levels  Continue home Prograf 2 mg b.i.d.  Continue home prednisone

## 2023-02-21 NOTE — HOSPITAL COURSE
67-year-old renal transplant black male was admitted for acute on chronic kidney injury.  Patient was just discharged from hospitalization for COVID pneumonia on 02/10/2023.  Presented to the ED per recommendations of the COVID surveillance team.  Did not have any definitive acute respiratory findings to indicate acute ongoing infectious COVID, chest x-ray was clear.  However creatinine was significantly elevated up to 3.8 whereas baseline was in the 2s.  Was started on IV fluids with trivial improvement.  CT chest was obtained due to his persistent unchanged severe dyspnea which showed some small bilateral effusions, not enough to drain via thoracentesis.  Patient was started on diuresis with improvement in shortness of breath.  Creatinine seemed to plateau around 3.9.  Patient did get delirious and was started on Depakote. Switched from GGT to IV lasix; and then switched to po lasix. Cr flatlined around 3.9-4.2. Pt did initially pass voiding trial but had some partial retention upwards of 600+mls. Hamilton was re-inserted to be maintained at discharge to minimize any worsening renal failure from any obstructive uropathy with a f/u recommended with urology outpt for a voiding trial.  Patient did have some intermittent delirium including the day of discharge but he was calm and cooperative and following instructions.  He was able to demonstrate ambulation on his own without assist.  There were no acute neuro focal deficits throughout his hospital stay including the day of discharge.  Wife verbalized understanding my explanation that his confusion may be intermittent and may actually become more prevalent in the near future due to his worsening renal function. Patient has met maximum benefit from hospitalization and is clinically stable for discharge.      Patient had affirmed during his hospital stay that he will not go on dialysis. Family was connected with palliative Care to follow up with them as an outpatient.   I  informed the patient's wife to have the patient come back should there be any concerns of any stroke-like deficits which she was educated on.

## 2023-02-21 NOTE — ASSESSMENT & PLAN NOTE
Baseline seems to be a creatinine in the 2s   Minimal improvement w/ IVFs, continue  transplant ordering US  Recheck levels in AM

## 2023-02-21 NOTE — PLAN OF CARE
Problem: Adult Inpatient Plan of Care  Goal: Plan of Care Review  Outcome: Ongoing, Progressing     Problem: Fluid and Electrolyte Imbalance (Acute Kidney Injury/Impairment)  Goal: Fluid and Electrolyte Balance  Outcome: Ongoing, Progressing     Problem: Oral Intake Inadequate (Acute Kidney Injury/Impairment)  Goal: Optimal Nutrition Intake  Outcome: Ongoing, Progressing     Problem: Renal Function Impairment (Acute Kidney Injury/Impairment)  Goal: Effective Renal Function  Outcome: Ongoing, Progressing    Pt AOx4. No acute events noted during shift. Vitals remained stable. No c/o pain or discomfort noted, scheduled meds given per MD orders. Q1-2hr safety checks completed. Bed remained low, locked, with all personal items in reach.

## 2023-02-21 NOTE — AI DETERIORATION ALERT
"RAPID RESPONSE NURSE AI ALERT       AI alert received.    Chart Reviewed: 02/21/2023, 3:55 PM    MRN: [id number]  Bed: 23253/67229 A    Dx: Acute-on-chronic kidney injury    Ibrahima Phelps has a past medical history of (HFpEF) heart failure with preserved ejection fraction, Atrial fibrillation, CAD (coronary artery disease), CHF (congestive heart failure), Chronic diastolic heart failure, CKD (chronic kidney disease) stage 3, GFR 30-59 ml/min, CKD (chronic kidney disease) stage 3, GFR 30-59 ml/min, Diverticulosis, Fever blister, Heart attack, Hyperlipidemia, Hypertension, Immunodeficiency due to treatment with immunosuppressive medication, Keloid cicatrix, Left lower quadrant abdominal pain, Metabolic bone disease, Pericarditis, S/P kidney transplant, Supraventricular tachycardia, Thrombocytopenia, Thyroid disease, Tobacco use, and Unspecified disorder of kidney and ureter.    Last VS: BP (!) 140/67 (BP Location: Left arm)   Pulse 74   Temp 98.4 °F (36.9 °C) (Oral)   Resp 18   Ht 6' 1" (1.854 m)   Wt 101.6 kg (224 lb)   SpO2 97%   BMI 29.55 kg/m²     24H Vital Sign Range:  Temp:  [96 °F (35.6 °C)-98.8 °F (37.1 °C)]   Pulse:  [69-85]   Resp:  [16-22]   BP: (118-163)/(64-72)   SpO2:  [88 %-97 %]     Level of Consciousness (AVPU): alert    Recent Labs     02/20/23  1233 02/21/23  0938   WBC 8.34 7.11   HGB 7.6* 7.6*   HCT 25.0* 25.2*    198       Recent Labs     02/20/23  1233 02/21/23  0938   * 137   K 4.5 4.3    107   CO2 18* 20*   CREATININE 3.8* 3.7*    85        No results for input(s): PH, PCO2, PO2, HCO3, POCSATURATED, BE in the last 72 hours.     OXYGEN:  Flow (L/min): 2          MEWS score: 1    Patient screened for sepsis; screen negative. Charge RNBryan  contacted. No concerns verbalized at this time. Instructed to call [phone] for further concerns or assistance.    Natalia Ahn RN        "

## 2023-02-21 NOTE — SUBJECTIVE & OBJECTIVE
Interval History:  Patient denies any changes to his shortness of breath, reports/affirms that this is chronic and unchanged since his COVID hospitalization.  No chest pain, tolerating p.o. intake.  Nurse reports patient is saturating in the high 80s at night presumably when he sleeping.  Minimal creatinine improvement from 3.8-3.6.    Review of Systems  Objective:     Vital Signs (Most Recent):  Temp: 98.4 °F (36.9 °C) (02/21/23 1536)  Pulse: 74 (02/21/23 1536)  Resp: 18 (02/21/23 1536)  BP: (!) 140/67 (02/21/23 1536)  SpO2: 97 % (02/21/23 1536)   Vital Signs (24h Range):  Temp:  [96 °F (35.6 °C)-98.8 °F (37.1 °C)] 98.4 °F (36.9 °C)  Pulse:  [69-85] 74  Resp:  [16-22] 18  SpO2:  [88 %-97 %] 97 %  BP: (118-163)/(64-72) 140/67     Weight: 101.6 kg (224 lb)  Body mass index is 29.55 kg/m².    Intake/Output Summary (Last 24 hours) at 2/21/2023 1614  Last data filed at 2/21/2023 0900  Gross per 24 hour   Intake 240 ml   Output 350 ml   Net -110 ml      Physical Exam    Clear lungs bilaterally, unlabored breathing   Laying down, on nasal cannula, no cyanosis  No obvious lower extremity edema  Sleeping, easily woken, no acute distress  Does have some chest wall tenderness to palpation diffusely which he affirms is some of his present a chest discomfort, denies any deep chest pain

## 2023-02-21 NOTE — PROGRESS NOTES
[facility] - [facility] Care ([address] Medicine  Progress Note    Patient Name: Ibrahima Phelps  MRN: [id number]  Patient Class: IP- Inpatient   Admission Date: 2/20/2023  Length of Stay: 1 days  Attending Physician: Russ Vizcaino MD  Primary Care Provider: Connie Magdaleno MD        Subjective:     Principal Problem:Acute-on-chronic kidney injury        HPI:  67-year-old black male transplant kidney patient being admitted for KATINA.  Patient was just discharged from a hospitalization for COVID pneumonia on 02/10/2023.  He also paid in ED visit a few days ago for what could have been a mild cellulitis/folliculitis on his foot for which he is almost done with his Keflex regimen.    Patient was instructed to come to the ED this time by the COVID surveillance nurses due to desaturation down to 88%.  When I talked with the patient and his wife in detail, they affirmed that he was not having any new or worsening shortness of breath since his discharge.  His wife did report that once the patient woke up/sat up his desaturation spontaneously resolved.  Patient denies any steven current shortness of breath or chest pain.  No nausea vomiting or flu fever like feeling.  In my discussion with the ED provider who was noted that the patient was complaining of shortness of breath but there was a concern that this likely was anxiety triggered.  Patient seems to be saturating well on room air here in the ED and chest x-ray which I personally reviewed is clear bilaterally.  Wife reports patient has been compliant with his medications.    The blood work did demonstrate an acutely elevated creatinine of 3.8 whereas his baseline is in the 2s.      Overview/Hospital Course:  67-year-old renal transplant black male was admitted for acute on chronic kidney injury.  Patient was just discharged from hospitalization for COVID pneumonia on 02/10/2023.  Presented to the ED per recommendations of the COVID surveillance team.  Did not  have any definitive acute respiratory findings to indicate acute ongoing infectious COVID, chest x-ray was clear.  However creatinine was significantly elevated up to 3.8 whereas baseline was in the 2s.  Was started on IV fluids with trivial improvement.      Interval History:  Patient denies any changes to his shortness of breath, reports/affirms that this is chronic and unchanged since his COVID hospitalization.  No chest pain, tolerating p.o. intake.  Nurse reports patient is saturating in the high 80s at night presumably when he sleeping.  Minimal creatinine improvement from 3.8-3.6.    Review of Systems  Objective:     Vital Signs (Most Recent):  Temp: 98.4 °F (36.9 °C) (02/21/23 1536)  Pulse: 74 (02/21/23 1536)  Resp: 18 (02/21/23 1536)  BP: (!) 140/67 (02/21/23 1536)  SpO2: 97 % (02/21/23 1536)   Vital Signs (24h Range):  Temp:  [96 °F (35.6 °C)-98.8 °F (37.1 °C)] 98.4 °F (36.9 °C)  Pulse:  [69-85] 74  Resp:  [16-22] 18  SpO2:  [88 %-97 %] 97 %  BP: (118-163)/(64-72) 140/67     Weight: 101.6 kg (224 lb)  Body mass index is 29.55 kg/m².    Intake/Output Summary (Last 24 hours) at 2/21/2023 1614  Last data filed at 2/21/2023 0900  Gross per 24 hour   Intake 240 ml   Output 350 ml   Net -110 ml      Physical Exam    Clear lungs bilaterally, unlabored breathing   Laying down, on nasal cannula, no cyanosis  No obvious lower extremity edema  Sleeping, easily woken, no acute distress  Does have some chest wall tenderness to palpation diffusely which he affirms is some of his present a chest discomfort, denies any deep chest pain          Assessment/Plan:      67-year-old black male admitted for acute on chronic transplant kidney injury    * Acute-on-chronic kidney injury  Baseline seems to be a creatinine in the 2s   Minimal improvement w/ IVFs, continue  transplant ordering US  Recheck levels in AM      H/O kidney transplant  K transplant service following  Routine prograf levels  Continue home Prograf 2 mg  b.i.d.  Continue home prednisone      Chronic dyspnea  Unchanged  CXR neg  CT chest w/o contrast ordered  Don't suspect acute covid      Cellulitis of foot  Finish regimen of cephalex in a few days      Anemia due to stage 4 chronic kidney disease  Stable, monitor      Chronic diastolic heart failure  Clinically euvolemic   Hold off on home dosing of p.r.n. Lasix given KATINA    Paroxysmal atrial fibrillation  Continue home Eliquis, flecainide, metoprolol        CAD (coronary artery disease)  History of stenting years ago   Continue home aspirin and statin, Toprol      Hypertension  Continue home metoprolol, nifedipine, hydralazine        VTE Risk Mitigation (From admission, onward)         Ordered     apixaban tablet 5 mg  2 times daily         02/20/23 1720                Discharge Planning   RAMU:      Code Status: Prior   Is the patient medically ready for discharge?:     Reason for patient still in hospital (select all that apply): Treatment                     Russ Vizcaino MD  Department of Hospital Medicine   [facility] - Intensive Care ([place]-16)

## 2023-02-21 NOTE — PROGRESS NOTES
Transplant Nephrology Note    Consult for transplant nehrology received. Attempted to see patient but unable to as patient was at US. Care discussed with wife as well as primary. Full note to follow tomorrow    Tasia Conroy DO  Transplant Nephrology

## 2023-02-22 PROBLEM — R06.2 WHEEZING: Status: ACTIVE | Noted: 2023-02-22

## 2023-02-22 PROBLEM — D63.1 ANEMIA OF RENAL DISEASE: Status: ACTIVE | Noted: 2023-02-22

## 2023-02-22 PROBLEM — N18.9 ANEMIA OF RENAL DISEASE: Status: ACTIVE | Noted: 2023-02-22

## 2023-02-22 LAB
ABO + RH BLD: NORMAL
ANION GAP SERPL CALC-SCNC: 12 MMOL/L (ref 8–16)
BASOPHILS # BLD AUTO: 0.01 K/UL (ref 0–0.2)
BASOPHILS NFR BLD: 0.1 % (ref 0–1.9)
BLD GP AB SCN CELLS X3 SERPL QL: NORMAL
BLD PROD TYP BPU: NORMAL
BLOOD UNIT EXPIRATION DATE: NORMAL
BLOOD UNIT TYPE CODE: 7300
BLOOD UNIT TYPE: NORMAL
BUN SERPL-MCNC: 39 MG/DL (ref 8–23)
CA-I BLDV-SCNC: 0.81 MMOL/L (ref 1.06–1.42)
CALCIUM SERPL-MCNC: 6.5 MG/DL (ref 8.7–10.5)
CHLORIDE SERPL-SCNC: 107 MMOL/L (ref 95–110)
CMV DNA SPEC QL NAA+PROBE: NOT DETECTED
CO2 SERPL-SCNC: 16 MMOL/L (ref 23–29)
CODING SYSTEM: NORMAL
CREAT SERPL-MCNC: 3.8 MG/DL (ref 0.5–1.4)
CREAT UR-MCNC: 162 MG/DL (ref 23–375)
CROSSMATCH INTERPRETATION: NORMAL
CYTOMEGALOVIRUS LOG (IU/ML): NOT DETECTED LOGIU/ML
CYTOMEGALOVIRUS PCR, QUANT: NOT DETECTED IU/ML
DIFFERENTIAL METHOD: ABNORMAL
DISPENSE STATUS: NORMAL
EOSINOPHIL # BLD AUTO: 0.1 K/UL (ref 0–0.5)
EOSINOPHIL NFR BLD: 1.5 % (ref 0–8)
ERYTHROCYTE [DISTWIDTH] IN BLOOD BY AUTOMATED COUNT: 15.7 % (ref 11.5–14.5)
EST. GFR  (NO RACE VARIABLE): 16.5 ML/MIN/1.73 M^2
GLUCOSE SERPL-MCNC: 75 MG/DL (ref 70–110)
HCT VFR BLD AUTO: 21.7 % (ref 40–54)
HGB BLD-MCNC: 6.5 G/DL (ref 14–18)
IMM GRANULOCYTES # BLD AUTO: 0.09 K/UL (ref 0–0.04)
IMM GRANULOCYTES NFR BLD AUTO: 1 % (ref 0–0.5)
LACTATE SERPL-SCNC: 1.5 MMOL/L (ref 0.5–2.2)
LYMPHOCYTES # BLD AUTO: 0.7 K/UL (ref 1–4.8)
LYMPHOCYTES NFR BLD: 8.6 % (ref 18–48)
MCH RBC QN AUTO: 24.6 PG (ref 27–31)
MCHC RBC AUTO-ENTMCNC: 30 G/DL (ref 32–36)
MCV RBC AUTO: 82 FL (ref 82–98)
MONOCYTES # BLD AUTO: 0.7 K/UL (ref 0.3–1)
MONOCYTES NFR BLD: 7.9 % (ref 4–15)
NEUTROPHILS # BLD AUTO: 7 K/UL (ref 1.8–7.7)
NEUTROPHILS NFR BLD: 80.9 % (ref 38–73)
NRBC BLD-RTO: 0 /100 WBC
NUM UNITS TRANS PACKED RBC: NORMAL
PLATELET # BLD AUTO: 200 K/UL (ref 150–450)
PMV BLD AUTO: 12 FL (ref 9.2–12.9)
POTASSIUM SERPL-SCNC: 4.4 MMOL/L (ref 3.5–5.1)
PROT UR-MCNC: 40 MG/DL (ref 0–15)
PROT/CREAT UR: 0.25 MG/G{CREAT} (ref 0–0.2)
RBC # BLD AUTO: 2.64 M/UL (ref 4.6–6.2)
SODIUM SERPL-SCNC: 135 MMOL/L (ref 136–145)
TACROLIMUS BLD-MCNC: 5.7 NG/ML (ref 5–15)
WBC # BLD AUTO: 8.6 K/UL (ref 3.9–12.7)

## 2023-02-22 PROCEDURE — 86900 BLOOD TYPING SEROLOGIC ABO: CPT | Performed by: HOSPITALIST

## 2023-02-22 PROCEDURE — 86833 HLA CLASS II HIGH DEFIN QUAL: CPT | Performed by: INTERNAL MEDICINE

## 2023-02-22 PROCEDURE — 86920 COMPATIBILITY TEST SPIN: CPT | Performed by: HOSPITALIST

## 2023-02-22 PROCEDURE — 99232 SBSQ HOSP IP/OBS MODERATE 35: CPT | Mod: ,,, | Performed by: INTERNAL MEDICINE

## 2023-02-22 PROCEDURE — 85025 COMPLETE CBC W/AUTO DIFF WBC: CPT | Performed by: HOSPITALIST

## 2023-02-22 PROCEDURE — 36430 TRANSFUSION BLD/BLD COMPNT: CPT

## 2023-02-22 PROCEDURE — 99233 PR SUBSEQUENT HOSPITAL CARE,LEVL III: ICD-10-PCS | Mod: 95,,, | Performed by: HOSPITALIST

## 2023-02-22 PROCEDURE — 99900035 HC TECH TIME PER 15 MIN (STAT)

## 2023-02-22 PROCEDURE — 86832 HLA CLASS I HIGH DEFIN QUAL: CPT | Performed by: INTERNAL MEDICINE

## 2023-02-22 PROCEDURE — P9016 RBC LEUKOCYTES REDUCED: HCPCS | Performed by: HOSPITALIST

## 2023-02-22 PROCEDURE — 80048 BASIC METABOLIC PNL TOTAL CA: CPT | Performed by: HOSPITALIST

## 2023-02-22 PROCEDURE — 27000221 HC OXYGEN, UP TO 24 HOURS

## 2023-02-22 PROCEDURE — 94761 N-INVAS EAR/PLS OXIMETRY MLT: CPT

## 2023-02-22 PROCEDURE — 80197 ASSAY OF TACROLIMUS: CPT | Performed by: HOSPITALIST

## 2023-02-22 PROCEDURE — 25000003 PHARM REV CODE 250: Performed by: INTERNAL MEDICINE

## 2023-02-22 PROCEDURE — 25000242 PHARM REV CODE 250 ALT 637 W/ HCPCS: Performed by: HOSPITALIST

## 2023-02-22 PROCEDURE — 12000002 HC ACUTE/MED SURGE SEMI-PRIVATE ROOM

## 2023-02-22 PROCEDURE — 99233 SBSQ HOSP IP/OBS HIGH 50: CPT | Mod: 95,,, | Performed by: HOSPITALIST

## 2023-02-22 PROCEDURE — 83605 ASSAY OF LACTIC ACID: CPT | Performed by: HOSPITALIST

## 2023-02-22 PROCEDURE — 99232 PR SUBSEQUENT HOSPITAL CARE,LEVL II: ICD-10-PCS | Mod: ,,, | Performed by: INTERNAL MEDICINE

## 2023-02-22 PROCEDURE — 63600175 PHARM REV CODE 636 W HCPCS: Performed by: HOSPITALIST

## 2023-02-22 PROCEDURE — 94640 AIRWAY INHALATION TREATMENT: CPT

## 2023-02-22 PROCEDURE — 25000003 PHARM REV CODE 250: Performed by: STUDENT IN AN ORGANIZED HEALTH CARE EDUCATION/TRAINING PROGRAM

## 2023-02-22 PROCEDURE — 86977 RBC SERUM PRETX INCUBJ/INHIB: CPT | Performed by: INTERNAL MEDICINE

## 2023-02-22 PROCEDURE — 82330 ASSAY OF CALCIUM: CPT | Performed by: INTERNAL MEDICINE

## 2023-02-22 PROCEDURE — 82570 ASSAY OF URINE CREATININE: CPT | Performed by: INTERNAL MEDICINE

## 2023-02-22 PROCEDURE — 25000003 PHARM REV CODE 250: Performed by: HOSPITALIST

## 2023-02-22 PROCEDURE — 36415 COLL VENOUS BLD VENIPUNCTURE: CPT | Performed by: HOSPITALIST

## 2023-02-22 RX ORDER — SODIUM BICARBONATE 650 MG/1
1300 TABLET ORAL EVERY 8 HOURS
Status: DISCONTINUED | OUTPATIENT
Start: 2023-02-22 | End: 2023-03-01

## 2023-02-22 RX ORDER — MUPIROCIN 20 MG/G
OINTMENT TOPICAL 2 TIMES DAILY
Status: COMPLETED | OUTPATIENT
Start: 2023-02-22 | End: 2023-02-26

## 2023-02-22 RX ORDER — TALC
6 POWDER (GRAM) TOPICAL NIGHTLY PRN
Status: DISCONTINUED | OUTPATIENT
Start: 2023-02-22 | End: 2023-03-06 | Stop reason: HOSPADM

## 2023-02-22 RX ORDER — HYDROCODONE BITARTRATE AND ACETAMINOPHEN 500; 5 MG/1; MG/1
TABLET ORAL
Status: DISCONTINUED | OUTPATIENT
Start: 2023-02-22 | End: 2023-03-04

## 2023-02-22 RX ADMIN — NIFEDIPINE 60 MG: 30 TABLET, FILM COATED, EXTENDED RELEASE ORAL at 08:02

## 2023-02-22 RX ADMIN — FAMOTIDINE 20 MG: 20 TABLET ORAL at 08:02

## 2023-02-22 RX ADMIN — HYDRALAZINE HYDROCHLORIDE 50 MG: 25 TABLET, FILM COATED ORAL at 08:02

## 2023-02-22 RX ADMIN — ATORVASTATIN CALCIUM 40 MG: 20 TABLET, FILM COATED ORAL at 08:02

## 2023-02-22 RX ADMIN — CLONAZEPAM 0.5 MG: 0.5 TABLET ORAL at 03:02

## 2023-02-22 RX ADMIN — Medication 6 MG: at 08:02

## 2023-02-22 RX ADMIN — PREDNISONE 5 MG: 5 TABLET ORAL at 08:02

## 2023-02-22 RX ADMIN — MUPIROCIN: 20 OINTMENT TOPICAL at 08:02

## 2023-02-22 RX ADMIN — METOPROLOL SUCCINATE 25 MG: 25 TABLET, EXTENDED RELEASE ORAL at 08:02

## 2023-02-22 RX ADMIN — APIXABAN 5 MG: 5 TABLET, FILM COATED ORAL at 08:02

## 2023-02-22 RX ADMIN — FLUTICASONE FUROATE AND VILANTEROL TRIFENATATE 1 PUFF: 100; 25 POWDER RESPIRATORY (INHALATION) at 08:02

## 2023-02-22 RX ADMIN — DOXAZOSIN 4 MG: 2 TABLET ORAL at 08:02

## 2023-02-22 RX ADMIN — ASPIRIN 81 MG: 81 TABLET, COATED ORAL at 08:02

## 2023-02-22 RX ADMIN — GABAPENTIN 300 MG: 300 CAPSULE ORAL at 08:02

## 2023-02-22 RX ADMIN — FLECAINIDE ACETATE 50 MG: 50 TABLET ORAL at 08:02

## 2023-02-22 RX ADMIN — TACROLIMUS 2 MG: 1 CAPSULE ORAL at 06:02

## 2023-02-22 RX ADMIN — GABAPENTIN 300 MG: 300 CAPSULE ORAL at 07:02

## 2023-02-22 RX ADMIN — CEPHALEXIN 500 MG: 500 CAPSULE ORAL at 03:02

## 2023-02-22 RX ADMIN — SODIUM BICARBONATE 1300 MG: 650 TABLET ORAL at 08:02

## 2023-02-22 RX ADMIN — CEPHALEXIN 500 MG: 500 CAPSULE ORAL at 06:02

## 2023-02-22 RX ADMIN — HYDRALAZINE HYDROCHLORIDE 50 MG: 25 TABLET, FILM COATED ORAL at 03:02

## 2023-02-22 RX ADMIN — SODIUM BICARBONATE 1300 MG: 650 TABLET ORAL at 03:02

## 2023-02-22 RX ADMIN — Medication 6 MG: at 03:02

## 2023-02-22 RX ADMIN — CLONAZEPAM 0.5 MG: 0.5 TABLET ORAL at 08:02

## 2023-02-22 RX ADMIN — TACROLIMUS 2 MG: 1 CAPSULE ORAL at 08:02

## 2023-02-22 RX ADMIN — PARICALCITOL 1 MCG: 1 CAPSULE, LIQUID FILLED ORAL at 08:02

## 2023-02-22 RX ADMIN — ALBUTEROL SULFATE 2 PUFF: 108 INHALANT RESPIRATORY (INHALATION) at 08:02

## 2023-02-22 RX ADMIN — CEPHALEXIN 500 MG: 500 CAPSULE ORAL at 08:02

## 2023-02-22 NOTE — CONSULTS
[facility] - Intensive Care ([place] [address])  Kidney Transplant  Consult Note    Consults      Subjective:     History of Present Illness:   67 year old man with past medical history of kidney transplant times 2 first one in 1992 and then again in 2005. Has CKD with baseline creatinine high 2s. Recently admitted earlier this month with COVID. Was admitted with KATINA with cr 3.8 on admit. Last admission when he was admitted with COVID his cr did peak at 3.8 but when he was on 2/10 his cr was 2.6 at baseline. Patient denies any diarrhea after discharge from hospital. Says that he has a hard time urinating but no dysuria, no hematuria , no flank pain , no fevers. Was on keflex for mild cellulitis. Denies NSAID use. This morning patient was wheezing and had some SOB. He was started on IVF NS with no improvement in scr this morning was 3.8    Mr. Phelps is a 67 y.o. year old male who is status post Kidney Transplant - 4/12/2005 - Not Followed  (#2).      Immunosuppressants (From admission, onward)      Start     Stop Route Frequency Ordered    02/20/23 1830  tacrolimus capsule 2 mg         -- Oral 2 times daily 02/20/23 1725                Past Medical and Surgical History: Mr. Phelps has a past medical history of (HFpEF) heart failure with preserved ejection fraction (9/25/2017), Atrial fibrillation, CAD (coronary artery disease), CHF (congestive heart failure), Chronic diastolic heart failure (4/8/2020), CKD (chronic kidney disease) stage 3, GFR 30-59 ml/min, CKD (chronic kidney disease) stage 3, GFR 30-59 ml/min, Diverticulosis, Fever blister, Heart attack (2006), Hyperlipidemia, Hypertension, Immunodeficiency due to treatment with immunosuppressive medication, Keloid cicatrix, Left lower quadrant abdominal pain (6/5/2022), Metabolic bone disease, Pericarditis, S/P kidney transplant, Supraventricular tachycardia (06/2019), Thrombocytopenia, Thyroid disease, Tobacco use (9/5/2014), and Unspecified disorder of kidney and  "ureter.  He has a past surgical history that includes Cholecystectomy; Cardioversion (04/30/15); Kidney transplant (2005); Parathyroidectomy; Colonoscopy (April 20, 2011); Colonoscopy (N/A, 3/13/2020); Colonoscopy (N/A, 2/4/2021); Colonoscopy (N/A, 3/3/2021); Coronary angioplasty with stent (9/13/2006); and Treatment of cardiac arrhythmia (N/A, 3/28/2022).    Past Social and Family History: Mr. Phelps reports that he quit smoking about a year ago. His smoking use included cigarettes. He has a 20.00 pack-year smoking history. He has never used smokeless tobacco. He reports that he does not drink alcohol and does not use drugs.His family history includes Heart disease in his father; Kidney disease in his brother and sister; Kidney failure in his brother and sister; No Known Problems in his brother, sister, sister, and sister; Thyroid disease in his mother.    Intake/Output - Last 3 Shifts         02/20 0700  02/21 0659 02/21 0700 02/22 0659 02/22 0700 02/23 0659    P.O.  720     I.V. (mL/kg)   2190 (21.6)    Total Intake(mL/kg)  720 (7.1) 2190 (21.6)    Urine (mL/kg/hr)  650 (0.3) 200 (1.1)    Total Output  650 200    Net  +70 +1990           Urine Occurrence 2 x               Review of Systems   All other systems reviewed and are negative.  Objective:     Vital Signs (Most Recent):  Temp: 98.9 °F (37.2 °C) (02/22/23 0749)  Pulse: 81 (02/22/23 0749)  Resp: 18 (02/22/23 0749)  BP: 139/64 (02/22/23 0749)  SpO2: 95 % (02/22/23 0749) Vital Signs (24h Range):  Temp:  [98.4 °F (36.9 °C)-98.9 °F (37.2 °C)] 98.9 °F (37.2 °C)  Pulse:  [74-85] 81  Resp:  [18-20] 18  SpO2:  [84 %-97 %] 95 %  BP: (137-150)/(64-72) 139/64     Weight: 101.6 kg (224 lb)  Height: 6' 1" (185.4 cm)  Body mass index is 29.55 kg/m².    Physical Exam  Vitals reviewed.   Constitutional:       Appearance: Normal appearance.   HENT:      Head: Normocephalic and atraumatic.      Mouth/Throat:      Mouth: Mucous membranes are moist.   Eyes:      " Conjunctiva/sclera: Conjunctivae normal.      Pupils: Pupils are equal, round, and reactive to light.   Cardiovascular:      Rate and Rhythm: Normal rate and regular rhythm.      Pulses: Normal pulses.      Heart sounds: Normal heart sounds.   Pulmonary:      Breath sounds: Wheezing present. No rales.   Abdominal:      General: Bowel sounds are normal.      Palpations: Abdomen is soft.   Musculoskeletal:         General: Normal range of motion.   Skin:     General: Skin is warm.      Capillary Refill: Capillary refill takes less than 2 seconds.   Neurological:      General: No focal deficit present.      Mental Status: He is alert and oriented to person, place, and time.   Psychiatric:         Mood and Affect: Mood normal.       Significant Labs:  CBC:   Recent Labs   Lab 02/20/23  1233 02/21/23  0938 02/22/23  0448   WBC 8.34 7.11 8.60   RBC 3.01* 3.16* 2.64*   HGB 7.6* 7.6* 6.5*   HCT 25.0* 25.2* 21.7*    198 200   MCV 83 80* 82   MCH 25.2* 24.1* 24.6*   MCHC 30.4* 30.2* 30.0*     BMP:   Recent Labs   Lab 02/20/23  1233 02/21/23  0938 02/22/23  0448    85 75   * 137 135*   K 4.5 4.3 4.4    107 107   CO2 18* 20* 16*   BUN 34* 38* 39*   CREATININE 3.8* 3.7* 3.8*   CALCIUM 7.3* 6.9* 6.5*     CMP:   Recent Labs   Lab 02/20/23  1233 02/21/23  0938 02/22/23  0448    85 75   CALCIUM 7.3* 6.9* 6.5*   ALBUMIN 2.4*  --   --    PROT 7.0  --   --    * 137 135*   K 4.5 4.3 4.4   CO2 18* 20* 16*    107 107   BUN 34* 38* 39*   CREATININE 3.8* 3.7* 3.8*   ALKPHOS 72  --   --    ALT 22  --   --    AST 22  --   --        Diagnostics:  Reviewed     Assessment/Plan:     Pulmonary  Wheezing And SOB   CT chest ordered by primary team   Management per primary ??Nebs       * Acute-on-chronic kidney injury  See kidney transplant       Hypocalcemia  Ordered ionized calcium   Replete calcium per primary       H/O kidney transplant  trasnsplant kidney times 2 first one was in 1992 and second one  was 2005 patient on prograf 2 mg BID and prednisone 5 mg po qday   KATINA on CKD scr at 3.8 and baseline cr is high 2.6-2.8   Non oliguric   UA bland   Transplant renal US no hydro   Patient having difficult time urinating   No improvement with IVF cr same today at 3.8   Bicarb 16 c/w acidosis in the setting of KATINA   Euvolemic on exam   Can not rule out underlying rejection will order DSA  Unlikely ATN no hemodynamic instability nor nephrotoxic agents to cause ATN . Also since didn't improve with IVF unlikely prerenal. Possible obstructive uropathy patient having difficult time emptying bladder.     Recs:   Stop IVF   [place] for possible retention  Ordered DSA  Check UPCR   Check RFP daily   Monitor prograf levels goal 4-5 pending today    Start po bicarb 1300 TID   Avoid nephrotoxins   Renally dose all meds           Cellulitis of foot  On keflex per primary team         Anemia of renal disease  hgb 6.5 today   Patient denies any bleeding no melena nor bleeding per rectum   Being transfused today per primary team   Management per primary team       Long-term use of immunosuppressant medication  On FK 2 mg bid   Levels pending today goal 4-5   On prednisone 5 mg po qday               Billy Sarabia MD  Kidney Transplant  [facility] - Intensive Care ([place]-16)

## 2023-02-22 NOTE — ASSESSMENT & PLAN NOTE
trasnsplant kidney times 2 first one was in 1992 and second one was 2005 patient on prograf 2 mg BID and prednisone 5 mg po qday   KATINA on CKD scr at 3.8 and baseline cr is high 2.6-2.8   Non oliguric   UA bland   Transplant renal US no hydro   Patient having difficult time urinating   No improvement with IVF cr same today at 3.8   Bicarb 16 c/w acidosis in the setting of KATINA   Euvolemic on exam   Can not rule out underlying rejection   Unlikely ATN no hemodynamic instability nor nephrotoxic agents to cause ATN . Also since didn't improve with IVF unlikely prerenal. Possible obstructive uropathy     Recs:   Stop IVF   Hamilton for possible retention  Check UPCR   Check RFP daily   Monitor prograf levels goal 4-5 pending today    Start po bicarb 1300 TID   Avoid nephrotoxins   Renally dose all meds

## 2023-02-22 NOTE — PROGRESS NOTES
[facility] - [facility] ([address] Medicine  Progress Note    Patient Name: Ibrahima Phelps  MRN: [id number]  Patient Class: IP- Inpatient   Admission Date: 2/20/2023  Length of Stay: 2 days  Attending Physician: Russ Vizcaino MD  Primary Care Provider: Connie Magdaleno MD        Subjective:     Principal Problem:Acute-on-chronic kidney injury        HPI:  67-year-old black male transplant kidney patient being admitted for KATINA.  Patient was just discharged from a hospitalization for COVID pneumonia on 02/10/2023.  He also paid in ED visit a few days ago for what could have been a mild cellulitis/folliculitis on his foot for which he is almost done with his Keflex regimen.    Patient was instructed to come to the ED this time by the COVID surveillance nurses due to desaturation down to 88%.  When I talked with the patient and his wife in detail, they affirmed that he was not having any new or worsening shortness of breath since his discharge.  His wife did report that once the patient woke up/sat up his desaturation spontaneously resolved.  Patient denies any steven current shortness of breath or chest pain.  No nausea vomiting or flu fever like feeling.  In my discussion with the ED provider who was noted that the patient was complaining of shortness of breath but there was a concern that this likely was anxiety triggered.  Patient seems to be saturating well on room air here in the ED and chest x-ray which I personally reviewed is clear bilaterally.  Wife reports patient has been compliant with his medications.    The blood work did demonstrate an acutely elevated creatinine of 3.8 whereas his baseline is in the 2s.      Overview/Hospital Course:  67-year-old renal transplant black male was admitted for acute on chronic kidney injury.  Patient was just discharged from hospitalization for COVID pneumonia on 02/10/2023.  Presented to the ED per recommendations of the COVID surveillance team.  Did not  have any definitive acute respiratory findings to indicate acute ongoing infectious COVID, chest x-ray was clear.  However creatinine was significantly elevated up to 3.8 whereas baseline was in the 2s.  Was started on IV fluids with trivial improvement.      Interval History:  Patient reports unchanged shortness of breath.  When asked in detail he denies any worsening of shortness of breath, no new or worsening chest pain.  It was reported that he was wheezing but when I see him he is just breathing heavily, on auscultation he has no wheezing.  His saturations were 92% on room air yesterday when sitting up.  Hemoglobin dropped down into the 60s today.  No obvious source of bleeding.  Did have some fair amount of urinary retention yesterday and urinated last night with a significant delay.  Creatinine still holding elevated at 3.8.    Review of Systems  Objective:     Vital Signs (Most Recent):  Temp: 98 °F (36.7 °C) (02/22/23 1549)  Pulse: 72 (02/22/23 1549)  Resp: 18 (02/22/23 1549)  BP: (!) 148/72 (02/22/23 1549)  SpO2: (!) 91 % (02/22/23 1549)   Vital Signs (24h Range):  Temp:  [98 °F (36.7 °C)-98.9 °F (37.2 °C)] 98 °F (36.7 °C)  Pulse:  [71-82] 72  Resp:  [18-20] 18  SpO2:  [84 %-95 %] 91 %  BP: (137-148)/(64-72) 148/72     Weight: 101.6 kg (224 lb)  Body mass index is 29.55 kg/m².    Intake/Output Summary (Last 24 hours) at 2/22/2023 1604  Last data filed at 2/22/2023 1242  Gross per 24 hour   Intake 2429.99 ml   Output 800 ml   Net 1629.99 ml      Physical Exam  Slightly diminished breath sounds in the bases, otherwise unlabored breathing, clear lungs bilaterally, mouth breathing, no steven wheezing heard on lung fields   Heart sounds indicate a regular rate and rhythm   No obvious lower extremity edema   Sleeping, easily woken, no acute distress, on nasal cannula, no cyanosis          Assessment/Plan:      68 y/o BM admitted for w/ Acute on chronic transplanted kidney injury    * Acute-on-chronic kidney  injury  Baseline seems to be a creatinine in the 2s   Cr still elevated unchanged at 3.8 despite IVs  US no overly revealing; Transplant working up rejection        H/O kidney transplant  K transplant service following  Routine prograf levels  Continue home Prograf 2 mg b.i.d.  Continue home prednisone      Chronic dyspnea  -Unchanged prior to admission; Don't suspect acute covid  -CT chest w/o contrast showing BL pleural effusions; d/w IR no need to hold anticoagulants; thoracentesis order; shows infiltrates but I believe these are older post covid inflammatory findings  -tx underlying anemia - see below  -covid + on 2/7/23; believe isolation is done; no steven hypoxia when pt sits up, 92 on RA on day of admission and on day 2 of hospital stay    Anemia of renal disease  Acute anemia  1 unit prbc ordered      Cellulitis of foot  Finish regimen of cephalex in a few days      Anemia due to stage 4 chronic kidney disease  Stable, monitor      Chronic diastolic heart failure  Clinically euvolemic   Hold off on home dosing of p.r.n. Lasix given KATINA    Paroxysmal atrial fibrillation  Continue home Eliquis, flecainide, metoprolol        CAD (coronary artery disease)  History of stenting years ago   Continue home aspirin and statin, Toprol      Hypertension  Continue home metoprolol, nifedipine, hydralazine      VTE Risk Mitigation (From admission, onward)         Ordered     apixaban tablet 5 mg  2 times daily         02/22/23 1607                Discharge Planning   RAMU:      Code Status: Prior   Is the patient medically ready for discharge?:     Reason for patient still in hospital (select all that apply): Treatment                     Russ Vizcaino MD  Department of Hospital Medicine   [facility] - Intensive Care ([place]-[facility])

## 2023-02-22 NOTE — SUBJECTIVE & OBJECTIVE
Subjective:     History of Present Illness:   67 year old man with past medical history of kidney transplant times 2 first one in 1992 and then again in 2005. Has CKD with baseline creatinine high 2s. Recently admitted earlier this month with COVID. Was admitted with KATINA with cr 3.8 on admit. Last admission when he was admitted with COVID his cr did peak at 3.8 but when he was on 2/10 his cr was 2.6 at baseline. Patient denies any diarrhea after discharge from hospital. Says that he has a hard time urinating but no dysuria, no hematuria , no flank pain , no fevers. Was on keflex for mild cellulitis. Denies NSAID use. This morning patient was wheezing and had some SOB. He was started on IVF NS with no improvement in scr this morning was 3.8    Mr. Phelps is a 67 y.o. year old male who is status post Kidney Transplant - 4/12/2005 - Not Followed  (#2).      Immunosuppressants (From admission, onward)      Start     Stop Route Frequency Ordered    02/20/23 1830  tacrolimus capsule 2 mg         -- Oral 2 times daily 02/20/23 1725                Past Medical and Surgical History: Mr. Phelps has a past medical history of (HFpEF) heart failure with preserved ejection fraction (9/25/2017), Atrial fibrillation, CAD (coronary artery disease), CHF (congestive heart failure), Chronic diastolic heart failure (4/8/2020), CKD (chronic kidney disease) stage 3, GFR 30-59 ml/min, CKD (chronic kidney disease) stage 3, GFR 30-59 ml/min, Diverticulosis, Fever blister, Heart attack (2006), Hyperlipidemia, Hypertension, Immunodeficiency due to treatment with immunosuppressive medication, Keloid cicatrix, Left lower quadrant abdominal pain (6/5/2022), Metabolic bone disease, Pericarditis, S/P kidney transplant, Supraventricular tachycardia (06/2019), Thrombocytopenia, Thyroid disease, Tobacco use (9/5/2014), and Unspecified disorder of kidney and ureter.  He has a past surgical history that includes Cholecystectomy; Cardioversion  "(04/30/15); Kidney transplant (2005); Parathyroidectomy; Colonoscopy (April 20, 2011); Colonoscopy (N/A, 3/13/2020); Colonoscopy (N/A, 2/4/2021); Colonoscopy (N/A, 3/3/2021); Coronary angioplasty with stent (9/13/2006); and Treatment of cardiac arrhythmia (N/A, 3/28/2022).    Past Social and Family History: Mr. Phelps reports that he quit smoking about a year ago. His smoking use included cigarettes. He has a 20.00 pack-year smoking history. He has never used smokeless tobacco. He reports that he does not drink alcohol and does not use drugs.His family history includes Heart disease in his father; Kidney disease in his brother and sister; Kidney failure in his brother and sister; No Known Problems in his brother, sister, sister, and sister; Thyroid disease in his mother.    Intake/Output - Last 3 Shifts         02/20 0700  02/21 0659 02/21 0700 02/22 0659 02/22 0700 02/23 0659    P.O.  720     I.V. (mL/kg)   2190 (21.6)    Total Intake(mL/kg)  720 (7.1) 2190 (21.6)    Urine (mL/kg/hr)  650 (0.3) 200 (1.1)    Total Output  650 200    Net  +70 +1990           Urine Occurrence 2 x               Review of Systems   All other systems reviewed and are negative.  Objective:     Vital Signs (Most Recent):  Temp: 98.9 °F (37.2 °C) (02/22/23 0749)  Pulse: 81 (02/22/23 0749)  Resp: 18 (02/22/23 0749)  BP: 139/64 (02/22/23 0749)  SpO2: 95 % (02/22/23 0749) Vital Signs (24h Range):  Temp:  [98.4 °F (36.9 °C)-98.9 °F (37.2 °C)] 98.9 °F (37.2 °C)  Pulse:  [74-85] 81  Resp:  [18-20] 18  SpO2:  [84 %-97 %] 95 %  BP: (137-150)/(64-72) 139/64     Weight: 101.6 kg (224 lb)  Height: 6' 1" (185.4 cm)  Body mass index is 29.55 kg/m².    Physical Exam  Vitals reviewed.   Constitutional:       Appearance: Normal appearance.   HENT:      Head: Normocephalic and atraumatic.      Mouth/Throat:      Mouth: Mucous membranes are moist.   Eyes:      Conjunctiva/sclera: Conjunctivae normal.      Pupils: Pupils are equal, round, and reactive to " light.   Cardiovascular:      Rate and Rhythm: Normal rate and regular rhythm.      Pulses: Normal pulses.      Heart sounds: Normal heart sounds.   Pulmonary:      Breath sounds: Wheezing present. No rales.   Abdominal:      General: Bowel sounds are normal.      Palpations: Abdomen is soft.   Musculoskeletal:         General: Normal range of motion.   Skin:     General: Skin is warm.      Capillary Refill: Capillary refill takes less than 2 seconds.   Neurological:      General: No focal deficit present.      Mental Status: He is alert and oriented to person, place, and time.   Psychiatric:         Mood and Affect: Mood normal.       Significant Labs:  CBC:   Recent Labs   Lab 02/20/23  1233 02/21/23  0938 02/22/23 0448   WBC 8.34 7.11 8.60   RBC 3.01* 3.16* 2.64*   HGB 7.6* 7.6* 6.5*   HCT 25.0* 25.2* 21.7*    198 200   MCV 83 80* 82   MCH 25.2* 24.1* 24.6*   MCHC 30.4* 30.2* 30.0*     BMP:   Recent Labs   Lab 02/20/23  1233 02/21/23  0938 02/22/23  0448    85 75   * 137 135*   K 4.5 4.3 4.4    107 107   CO2 18* 20* 16*   BUN 34* 38* 39*   CREATININE 3.8* 3.7* 3.8*   CALCIUM 7.3* 6.9* 6.5*     CMP:   Recent Labs   Lab 02/20/23  1233 02/21/23  0938 02/22/23  0448    85 75   CALCIUM 7.3* 6.9* 6.5*   ALBUMIN 2.4*  --   --    PROT 7.0  --   --    * 137 135*   K 4.5 4.3 4.4   CO2 18* 20* 16*    107 107   BUN 34* 38* 39*   CREATININE 3.8* 3.7* 3.8*   ALKPHOS 72  --   --    ALT 22  --   --    AST 22  --   --        Diagnostics:  Reviewed

## 2023-02-22 NOTE — PROGRESS NOTES
Patient bladder scan volume was 418 mL. Patient refused pearson, states he will attempt to void. WCTM.

## 2023-02-22 NOTE — ASSESSMENT & PLAN NOTE
-Unchanged prior to admission; Don't suspect acute covid  -CT chest w/o contrast showing BL pleural effusions; d/w IR no need to hold anticoagulants; thoracentesis order; shows infiltrates but I believe these are older post covid inflammatory findings  -tx underlying anemia - see below  -covid + on 2/7/23; believe isolation is done; no steven hypoxia when pt sits up, 92 on RA on day of admission and on day 2 of hospital stay

## 2023-02-22 NOTE — ASSESSMENT & PLAN NOTE
On FK 2 mg bid   Levels pending today goal 4-5   On prednisone 5 mg po qday      We hope that we have addressed all of your medical concerns. The examination and treatment you received in the Emergency Department were for an emergent problem and were not intended as complete care. It is important that you follow up with your healthcare provider(s) for ongoing care. If your symptoms worsen or do not improve as expected, and you are unable to reach your usual health care provider(s), you should return to the Emergency Department. Today's healthcare is undergoing tremendous change, and patient satisfaction surveys are one of the many tools to assess the quality of medical care. You may receive a survey from the [employer] regarding your experience in the Emergency Department. I hope that your experience has been completely positive, particularly the medical care that I provided. As such, please participate in the survey; anything less than excellent does not meet my expectations or intentions. Thank you for allowing us to provide you with medical care today. We realize that you have many choices for your emergency care needs. Please choose us in the future for any continued health care needs. Georgia Llamas, [facility]: [phone]            No results found for this or any previous visit (from the past 24 hour(s)). Xr Knee Lt 3 V    Result Date: 11/28/2017  EXAM:  XR KNEE LT 3 V Clinical history: Knee pain post trauma INDICATION:   knee pain. COMPARISON: None. FINDINGS: Three views of the left knee demonstrate no fracture or other acute osseous or articular abnormality. There is a moderate joint effusion. IMPRESSION:  Moderate joint effusion. No fracture. Tevin Alston

## 2023-02-22 NOTE — PLAN OF CARE
Problem: Adult Inpatient Plan of Care  Goal: Plan of Care Review  Outcome: Ongoing, Progressing     Problem: Fluid and Electrolyte Imbalance (Acute Kidney Injury/Impairment)  Goal: Fluid and Electrolyte Balance  Outcome: Ongoing, Progressing     Problem: Oral Intake Inadequate (Acute Kidney Injury/Impairment)  Goal: Optimal Nutrition Intake  Outcome: Ongoing, Progressing     Problem: Renal Function Impairment (Acute Kidney Injury/Impairment)  Goal: Effective Renal Function  Outcome: Ongoing, Progressing     Pt AOx4. No acute events noted during shift. Vitals remained stable. No c/o pain or discomfort noted, scheduled meds given per MD orders. Pt remains on 3L of o2 at 91-93%. Q1-2hr safety checks completed. Bed remained low, locked, with all personal items in reach.

## 2023-02-22 NOTE — ASSESSMENT & PLAN NOTE
hgb 6.5 today   Patient denies any bleeding no melena nor bleeding per rectum   Being transfused today per primary team   Management per primary team

## 2023-02-22 NOTE — ASSESSMENT & PLAN NOTE
Baseline seems to be a creatinine in the 2s   Cr still elevated unchanged at 3.8 despite IVs  US no overly revealing; Transplant working up rejection

## 2023-02-22 NOTE — HPI
67 year old man with past medical history of kidney transplant times 2 first one in 1992 and then again in 2005. Has CKD with baseline creatinine high 2s. Recently admitted earlier this month with COVID. Was admitted with KATINA with cr 3.8 on admit. Last admission when he was admitted with COVID his cr did peak at 3.8 but when he was on 2/10 his cr was 2.6 at baseline. Patient denies any diarrhea after discharge from hospital. Says that he has a hard time urinating but no dysuria, no hematuria , no flank pain , no fevers. Was on keflex for mild cellulitis. Denies NSAID use. This morning patient was wheezing and had some SOB. He was started on IVF NS with no improvement in scr this morning was 3.8

## 2023-02-22 NOTE — SUBJECTIVE & OBJECTIVE
Interval History:  Patient reports unchanged shortness of breath.  When asked in detail he denies any worsening of shortness of breath, no new or worsening chest pain.  It was reported that he was wheezing but when I see him he is just breathing heavily, on auscultation he has no wheezing.  His saturations were 92% on room air yesterday when sitting up.  Hemoglobin dropped down into the 60s today.  No obvious source of bleeding.  Did have some fair amount of urinary retention yesterday and urinated last night with a significant delay.  Creatinine still holding elevated at 3.8.    Review of Systems  Objective:     Vital Signs (Most Recent):  Temp: 98 °F (36.7 °C) (02/22/23 1549)  Pulse: 72 (02/22/23 1549)  Resp: 18 (02/22/23 1549)  BP: (!) 148/72 (02/22/23 1549)  SpO2: (!) 91 % (02/22/23 1549)   Vital Signs (24h Range):  Temp:  [98 °F (36.7 °C)-98.9 °F (37.2 °C)] 98 °F (36.7 °C)  Pulse:  [71-82] 72  Resp:  [18-20] 18  SpO2:  [84 %-95 %] 91 %  BP: (137-148)/(64-72) 148/72     Weight: 101.6 kg (224 lb)  Body mass index is 29.55 kg/m².    Intake/Output Summary (Last 24 hours) at 2/22/2023 1604  Last data filed at 2/22/2023 1242  Gross per 24 hour   Intake 2429.99 ml   Output 800 ml   Net 1629.99 ml      Physical Exam  Slightly diminished breath sounds in the bases, otherwise unlabored breathing, clear lungs bilaterally, mouth breathing, no steven wheezing heard on lung fields   Heart sounds indicate a regular rate and rhythm   No obvious lower extremity edema   Sleeping, easily woken, no acute distress, on nasal cannula, no cyanosis

## 2023-02-22 NOTE — PLAN OF CARE
Problem: Adult Inpatient Plan of Care  Goal: Plan of Care Review  Outcome: Ongoing, Progressing  Flowsheets (Taken 2/21/2023 1856)  Plan of Care Reviewed With:   patient   spouse  Goal: Patient-Specific Goal (Individualized)  Outcome: Ongoing, Progressing  Goal: Absence of Hospital-Acquired Illness or Injury  Outcome: Ongoing, Progressing  Intervention: Identify and Manage Fall Risk  Flowsheets (Taken 2/21/2023 1856)  Safety Promotion/Fall Prevention:   assistive device/personal item within reach   side rails raised x 2  Intervention: Prevent Skin Injury  Flowsheets (Taken 2/21/2023 1856)  Body Position: position maintained  Intervention: Prevent and Manage VTE (Venous Thromboembolism) Risk  Flowsheets (Taken 2/21/2023 1856)  VTE Prevention/Management:   bleeding risk assessed   bleeding precations maintained

## 2023-02-23 LAB
ALBUMIN SERPL BCP-MCNC: 2.1 G/DL (ref 3.5–5.2)
ANION GAP SERPL CALC-SCNC: 11 MMOL/L (ref 8–16)
BASOPHILS # BLD AUTO: 0.01 K/UL (ref 0–0.2)
BASOPHILS NFR BLD: 0.1 % (ref 0–1.9)
BUN SERPL-MCNC: 41 MG/DL (ref 8–23)
CALCIUM SERPL-MCNC: 6.7 MG/DL (ref 8.7–10.5)
CHLORIDE SERPL-SCNC: 106 MMOL/L (ref 95–110)
CO2 SERPL-SCNC: 18 MMOL/L (ref 23–29)
CREAT SERPL-MCNC: 4 MG/DL (ref 0.5–1.4)
DIFFERENTIAL METHOD: ABNORMAL
EOSINOPHIL # BLD AUTO: 0.2 K/UL (ref 0–0.5)
EOSINOPHIL NFR BLD: 2 % (ref 0–8)
ERYTHROCYTE [DISTWIDTH] IN BLOOD BY AUTOMATED COUNT: 15.7 % (ref 11.5–14.5)
EST. GFR  (NO RACE VARIABLE): 15.5 ML/MIN/1.73 M^2
GLUCOSE SERPL-MCNC: 109 MG/DL (ref 70–110)
HCT VFR BLD AUTO: 24 % (ref 40–54)
HGB BLD-MCNC: 7.3 G/DL (ref 14–18)
IMM GRANULOCYTES # BLD AUTO: 0.07 K/UL (ref 0–0.04)
IMM GRANULOCYTES NFR BLD AUTO: 0.9 % (ref 0–0.5)
LYMPHOCYTES # BLD AUTO: 0.7 K/UL (ref 1–4.8)
LYMPHOCYTES NFR BLD: 9.3 % (ref 18–48)
MCH RBC QN AUTO: 24 PG (ref 27–31)
MCHC RBC AUTO-ENTMCNC: 30.4 G/DL (ref 32–36)
MCV RBC AUTO: 79 FL (ref 82–98)
MONOCYTES # BLD AUTO: 0.6 K/UL (ref 0.3–1)
MONOCYTES NFR BLD: 8.1 % (ref 4–15)
NEUTROPHILS # BLD AUTO: 6 K/UL (ref 1.8–7.7)
NEUTROPHILS NFR BLD: 79.6 % (ref 38–73)
NRBC BLD-RTO: 0 /100 WBC
PHOSPHATE SERPL-MCNC: 6.1 MG/DL (ref 2.7–4.5)
PLATELET # BLD AUTO: 190 K/UL (ref 150–450)
PMV BLD AUTO: 10.3 FL (ref 9.2–12.9)
POTASSIUM SERPL-SCNC: 4.3 MMOL/L (ref 3.5–5.1)
RBC # BLD AUTO: 3.04 M/UL (ref 4.6–6.2)
SODIUM SERPL-SCNC: 135 MMOL/L (ref 136–145)
TACROLIMUS BLD-MCNC: 6.1 NG/ML (ref 5–15)
WBC # BLD AUTO: 7.56 K/UL (ref 3.9–12.7)

## 2023-02-23 PROCEDURE — 99233 PR SUBSEQUENT HOSPITAL CARE,LEVL III: ICD-10-PCS | Mod: ,,, | Performed by: INTERNAL MEDICINE

## 2023-02-23 PROCEDURE — 12000002 HC ACUTE/MED SURGE SEMI-PRIVATE ROOM

## 2023-02-23 PROCEDURE — 85025 COMPLETE CBC W/AUTO DIFF WBC: CPT | Performed by: INTERNAL MEDICINE

## 2023-02-23 PROCEDURE — 63600175 PHARM REV CODE 636 W HCPCS: Performed by: INTERNAL MEDICINE

## 2023-02-23 PROCEDURE — 25000242 PHARM REV CODE 250 ALT 637 W/ HCPCS: Performed by: HOSPITALIST

## 2023-02-23 PROCEDURE — 51798 US URINE CAPACITY MEASURE: CPT

## 2023-02-23 PROCEDURE — 25000003 PHARM REV CODE 250: Performed by: HOSPITALIST

## 2023-02-23 PROCEDURE — 25000003 PHARM REV CODE 250: Performed by: INTERNAL MEDICINE

## 2023-02-23 PROCEDURE — 63600175 PHARM REV CODE 636 W HCPCS: Performed by: HOSPITALIST

## 2023-02-23 PROCEDURE — 94640 AIRWAY INHALATION TREATMENT: CPT

## 2023-02-23 PROCEDURE — 36415 COLL VENOUS BLD VENIPUNCTURE: CPT | Performed by: HOSPITALIST

## 2023-02-23 PROCEDURE — 99233 SBSQ HOSP IP/OBS HIGH 50: CPT | Mod: ,,, | Performed by: INTERNAL MEDICINE

## 2023-02-23 PROCEDURE — 94761 N-INVAS EAR/PLS OXIMETRY MLT: CPT

## 2023-02-23 PROCEDURE — 27000221 HC OXYGEN, UP TO 24 HOURS

## 2023-02-23 PROCEDURE — 80069 RENAL FUNCTION PANEL: CPT | Performed by: INTERNAL MEDICINE

## 2023-02-23 PROCEDURE — 80197 ASSAY OF TACROLIMUS: CPT | Performed by: HOSPITALIST

## 2023-02-23 PROCEDURE — 99900035 HC TECH TIME PER 15 MIN (STAT)

## 2023-02-23 PROCEDURE — 25000003 PHARM REV CODE 250: Performed by: STUDENT IN AN ORGANIZED HEALTH CARE EDUCATION/TRAINING PROGRAM

## 2023-02-23 PROCEDURE — 36415 COLL VENOUS BLD VENIPUNCTURE: CPT | Performed by: INTERNAL MEDICINE

## 2023-02-23 PROCEDURE — 99231 SBSQ HOSP IP/OBS SF/LOW 25: CPT | Mod: ,,, | Performed by: INTERNAL MEDICINE

## 2023-02-23 PROCEDURE — 99231 PR SUBSEQUENT HOSPITAL CARE,LEVL I: ICD-10-PCS | Mod: ,,, | Performed by: INTERNAL MEDICINE

## 2023-02-23 RX ORDER — FUROSEMIDE 10 MG/ML
40 INJECTION INTRAMUSCULAR; INTRAVENOUS
Status: COMPLETED | OUTPATIENT
Start: 2023-02-23 | End: 2023-02-23

## 2023-02-23 RX ADMIN — CALCIUM GLUCONATE 1 G: 98 INJECTION, SOLUTION INTRAVENOUS at 11:02

## 2023-02-23 RX ADMIN — GABAPENTIN 300 MG: 300 CAPSULE ORAL at 08:02

## 2023-02-23 RX ADMIN — ATORVASTATIN CALCIUM 40 MG: 20 TABLET, FILM COATED ORAL at 08:02

## 2023-02-23 RX ADMIN — FLECAINIDE ACETATE 50 MG: 50 TABLET ORAL at 08:02

## 2023-02-23 RX ADMIN — FLUTICASONE FUROATE AND VILANTEROL TRIFENATATE 1 PUFF: 100; 25 POWDER RESPIRATORY (INHALATION) at 09:02

## 2023-02-23 RX ADMIN — FAMOTIDINE 20 MG: 20 TABLET ORAL at 09:02

## 2023-02-23 RX ADMIN — CLONAZEPAM 0.5 MG: 0.5 TABLET ORAL at 08:02

## 2023-02-23 RX ADMIN — HYDRALAZINE HYDROCHLORIDE 50 MG: 25 TABLET, FILM COATED ORAL at 04:02

## 2023-02-23 RX ADMIN — ASPIRIN 81 MG: 81 TABLET, COATED ORAL at 09:02

## 2023-02-23 RX ADMIN — MUPIROCIN: 20 OINTMENT TOPICAL at 09:02

## 2023-02-23 RX ADMIN — SODIUM BICARBONATE 1300 MG: 650 TABLET ORAL at 04:02

## 2023-02-23 RX ADMIN — CEPHALEXIN 500 MG: 500 CAPSULE ORAL at 04:02

## 2023-02-23 RX ADMIN — FUROSEMIDE 40 MG: 10 INJECTION, SOLUTION INTRAMUSCULAR; INTRAVENOUS at 02:02

## 2023-02-23 RX ADMIN — PARICALCITOL 1 MCG: 1 CAPSULE, LIQUID FILLED ORAL at 09:02

## 2023-02-23 RX ADMIN — FLECAINIDE ACETATE 50 MG: 50 TABLET ORAL at 09:02

## 2023-02-23 RX ADMIN — FUROSEMIDE 40 MG: 10 INJECTION, SOLUTION INTRAMUSCULAR; INTRAVENOUS at 11:02

## 2023-02-23 RX ADMIN — HYDRALAZINE HYDROCHLORIDE 50 MG: 25 TABLET, FILM COATED ORAL at 08:02

## 2023-02-23 RX ADMIN — TACROLIMUS 2 MG: 1 CAPSULE ORAL at 09:02

## 2023-02-23 RX ADMIN — DOXAZOSIN 4 MG: 2 TABLET ORAL at 08:02

## 2023-02-23 RX ADMIN — CALCIUM GLUCONATE 1 G: 98 INJECTION, SOLUTION INTRAVENOUS at 09:02

## 2023-02-23 RX ADMIN — SODIUM BICARBONATE 1300 MG: 650 TABLET ORAL at 05:02

## 2023-02-23 RX ADMIN — TACROLIMUS 2 MG: 1 CAPSULE ORAL at 06:02

## 2023-02-23 RX ADMIN — CEPHALEXIN 500 MG: 500 CAPSULE ORAL at 05:02

## 2023-02-23 RX ADMIN — APIXABAN 5 MG: 5 TABLET, FILM COATED ORAL at 08:02

## 2023-02-23 RX ADMIN — PREDNISONE 5 MG: 5 TABLET ORAL at 09:02

## 2023-02-23 RX ADMIN — METOPROLOL SUCCINATE 25 MG: 25 TABLET, EXTENDED RELEASE ORAL at 09:02

## 2023-02-23 RX ADMIN — DOXAZOSIN 4 MG: 2 TABLET ORAL at 09:02

## 2023-02-23 RX ADMIN — SODIUM BICARBONATE 1300 MG: 650 TABLET ORAL at 09:02

## 2023-02-23 RX ADMIN — NIFEDIPINE 60 MG: 30 TABLET, FILM COATED, EXTENDED RELEASE ORAL at 08:02

## 2023-02-23 RX ADMIN — CEPHALEXIN 500 MG: 500 CAPSULE ORAL at 09:02

## 2023-02-23 RX ADMIN — Medication 6 MG: at 08:02

## 2023-02-23 RX ADMIN — APIXABAN 5 MG: 5 TABLET, FILM COATED ORAL at 09:02

## 2023-02-23 RX ADMIN — NIFEDIPINE 60 MG: 30 TABLET, FILM COATED, EXTENDED RELEASE ORAL at 09:02

## 2023-02-23 RX ADMIN — HYDRALAZINE HYDROCHLORIDE 50 MG: 25 TABLET, FILM COATED ORAL at 09:02

## 2023-02-23 RX ADMIN — GABAPENTIN 300 MG: 300 CAPSULE ORAL at 09:02

## 2023-02-23 NOTE — PROGRESS NOTES
[facility] - Intensive Care ([facility]-)  Kidney Transplant  Progress Note      Reason for Follow-up: Reassessment of Kidney Transplant - 4/12/2005 - Not Followed  (#2) recipient and management of immunosuppression.        Subjective:   History of Present Illness:  67 year old man with past medical history of kidney transplant times 2 first one in 1992 and then again in 2005. Has CKD with baseline creatinine high 2s. Recently admitted earlier this month with COVID. Was admitted with KATINA with cr 3.8 on admit. Last admission when he was admitted with COVID his cr did peak at 3.8 but when he was on 2/10 his cr was 2.6 at baseline. Patient denies any diarrhea after discharge from hospital. Says that he has a hard time urinating but no dysuria, no hematuria , no flank pain , no fevers. Was on keflex for mild cellulitis. Denies NSAID use. This morning patient was wheezing and had some SOB. He was started on IVF NS with no improvement in scr this morning was 3.8    Mr. Phelps is a 67 y.o. year old male who is status post Kidney Transplant - 4/12/2005 - Not Followed  (#2).    His maintenance immunosuppression consists of:   Immunosuppressants (From admission, onward)      Start     Stop Route Frequency Ordered    02/20/23 1830  tacrolimus capsule 2 mg         -- Oral 2 times daily 02/20/23 1725            Interval History:patient feel better today. Wheezing resolved, had pearson in yesterday but was painful and removed per patient request.       Past Medical, Surgical, Family, and Social History:   Unchanged from H&P.    Scheduled Meds:   apixaban  5 mg Oral BID    aspirin  81 mg Oral Daily    atorvastatin  40 mg Oral QHS    calcium gluconate IVPB  1 g Intravenous Q1H    cephALEXin  500 mg Oral Q8H    doxazosin  4 mg Oral Q12H    famotidine  20 mg Oral Daily    flecainide  50 mg Oral Q12H    fluticasone furoate-vilanteroL  1 puff Inhalation Daily    furosemide (LASIX) injection  40 mg Intravenous Q12H    gabapentin  " 300 mg Oral BID    hydrALAZINE  50 mg Oral TID    metoprolol succinate  25 mg Oral Daily    mupirocin   Nasal BID    NIFEdipine  60 mg Oral BID    paricalcitoL  1 mcg Oral Daily    predniSONE  5 mg Oral Daily    sodium bicarbonate  1,300 mg Oral Q8H    tacrolimus  2 mg Oral BID     Continuous Infusions:  PRN Meds:sodium chloride, albuterol, clonazePAM, melatonin    Intake/Output - Last 3 Shifts         02/21 0700 02/22 0659 02/22 0700 02/23 0659 02/23 0700 02/24 0659    P.O. 720 150     I.V. (mL/kg)  2190 (21.6)     Blood  298.8     Total Intake(mL/kg) 720 (7.1) 2638.7 (26)     Urine (mL/kg/hr) 650 (0.3) 600 (0.2)     Total Output 650 600     Net +70 +2038.7            Urine Occurrence  1 x              Review of Systems   All other systems reviewed and are negative.   Objective:     Vital Signs (Most Recent):  Temp: 98.7 °F (37.1 °C) (02/23/23 0830)  Pulse: 84 (02/23/23 0914)  Resp: 20 (02/23/23 0914)  BP: (!) 157/75 (02/23/23 0830)  SpO2: 97 % (02/23/23 0914)   Vital Signs (24h Range):  Temp:  [97.8 °F (36.6 °C)-98.7 °F (37.1 °C)] 98.7 °F (37.1 °C)  Pulse:  [69-84] 84  Resp:  [18-20] 20  SpO2:  [89 %-97 %] 97 %  BP: (133-157)/(65-75) 157/75     Weight: 101.6 kg (224 lb)  Height: 6' 1" (185.4 cm)  Body mass index is 29.55 kg/m².    Physical Exam  HENT:      Head: Normocephalic and atraumatic.      Mouth/Throat:      Mouth: Mucous membranes are moist.   Eyes:      Conjunctiva/sclera: Conjunctivae normal.      Pupils: Pupils are equal, round, and reactive to light.   Cardiovascular:      Rate and Rhythm: Normal rate and regular rhythm.      Pulses: Normal pulses.      Heart sounds: Normal heart sounds.   Pulmonary:      Effort: Pulmonary effort is normal.      Breath sounds: No wheezing or rales.      Comments: Prolonged expiratory phase   Abdominal:      General: Bowel sounds are normal.      Palpations: Abdomen is soft.   Musculoskeletal:         General: Normal range of motion.   Skin:     General: " Skin is warm.      Capillary Refill: Capillary refill takes less than 2 seconds.   Neurological:      General: No focal deficit present.      Mental Status: He is alert and oriented to person, place, and time.   Psychiatric:         Mood and Affect: Mood normal.       Laboratory:  CBC:   Recent Labs   Lab 02/21/23  0938 02/22/23 0448 02/23/23  0808   WBC 7.11 8.60 7.56   RBC 3.16* 2.64* 3.04*   HGB 7.6* 6.5* 7.3*   HCT 25.2* 21.7* 24.0*    200 190   MCV 80* 82 79*   MCH 24.1* 24.6* 24.0*   MCHC 30.2* 30.0* 30.4*     BMP:   Recent Labs   Lab 02/21/23 0938 02/22/23 0448 02/23/23  0808   GLU 85 75 109    135* 135*   K 4.3 4.4 4.3    107 106   CO2 20* 16* 18*   BUN 38* 39* 41*   CREATININE 3.7* 3.8* 4.0*   CALCIUM 6.9* 6.5* 6.7*       Diagnostic Results:  Reviewed     Assessment/Plan:     * Acute-on-chronic kidney injury  See kidney transplant       Anemia of renal disease  hgb 6.5 today   Patient denies any bleeding no melena nor bleeding per rectum   Being transfused today per primary team   Management per primary team       Wheezing  CT chest ordered by primary team   Management per primary ??Nebs       Cellulitis of foot  On keflex per primary team       Hypocalcemia  Replete calcium per primary       H/O kidney transplant  trasnsplant kidney times 2 first one was in 1992 and second one was 2005 patient on prograf 2 mg BID and prednisone 5 mg po qday   KATINA on CKD scr at 40 today worsening from 3.8 yesterday and baseline cr is high 2.6-2.8.   Non oliguric   UA bland   Transplant renal US no hydro   Patient having difficult time urinating   No improvement with IVF cr same today at 3.8   Bicarb 16 c/w acidosis in the setting of KATINA   Euvolemic on exam   Can not rule out underlying rejection   Unlikely ATN no hemodynamic instability nor nephrotoxic agents to cause ATN . Also since didn't improve with IVF unlikely prerenal. CT chest with B/L pleural effusions     Recs:   IV lasix 40 mg BID today  then will reassess tomorrow given has B/L effusions on ct chest. Also patient was taking lasix at home and was held here   Check RFP daily   po bicarb 1300 TID bicarb 18   Avoid nephrotoxins   Renally dose all meds         Long-term use of immunosuppressant medication  On FK 2 mg bid. Level 6.1 today at goal    On prednisone 5 mg po qday               Billy Sarabia MD  Kidney Transplant  [facility] - Intensive Care ([place]-)

## 2023-02-23 NOTE — ASSESSMENT & PLAN NOTE
Clinically euvolemic   Initially held home dosing of p.r.n. Lasix given KATINA and did trial of IVF but no improvement in Scr  - nephrology following   - started trial of IV lasix on 2/13. followup BMP

## 2023-02-23 NOTE — PLAN OF CARE
Provider assessed patient and found no fluid for thoracentesis and/or labs. Patient AAOx3, no distress noted, respirations even and unlabored. Allergies reviewed. Patient to return to room. Patient stable for transfer.   Vitals:    02/23/23 1103   BP: (!) 147/65   Pulse: 78   Resp: 18   Temp:

## 2023-02-23 NOTE — PLAN OF CARE
[facility] - Intensive Care ([place] [address])  Initial Discharge Assessment       Primary Care Provider: Connie Magdaleno MD    Admission Diagnosis: SOB (shortness of breath) [R06.02]  COVID-19 [U07.1]    Admission Date: 2/20/2023  Expected Discharge Date: 2/24/2023         Payor: [employer] MANAGED MEDICARE / Plan: [employer] MEDICARE HMO / Product Type: Capitation /     Extended Emergency Contact Information  Primary Emergency Contact: PhelpsShea  Address: [address] [place] [address] [address]  Home Phone: [phone]  Mobile Phone: [phone]  Relation: Spouse  Preferred language: English    Discharge Plan A: (P) Home with family         [id number] - [place], [place] - [address]  [address]  [facility] [address]  Phone: [phone] Fax: [phone]    [facility] - [facility] [address] [facility] [facility]  [address] [facility] [address]  Phone: [phone] Fax: [phone]      Initial Assessment (most recent)       Adult Discharge Assessment - 02/23/23 1255          Discharge Assessment    Assessment Type Discharge Planning Assessment (P)      Confirmed/corrected address, phone number and insurance Yes (P)      Confirmed Demographics Correct on Facesheet (P)      Source of Information patient;family (P)      Reason For Admission sob (P)      People in Home spouse (P)      Do you expect to return to your current living situation? Yes (P)      Do you have help at home or someone to help you manage your care at home? Yes (P)      Who are your caregiver(s) and their phone number(s)? spouse (P)      Prior to hospitilization cognitive status: Alert/Oriented (P)      Current cognitive status: Alert/Oriented (P)      Equipment Currently Used at Home none (P)      Readmission within 30 days? No (P)      Patient currently being followed by outpatient case management? No (P)      Do you currently have service(s) that help you manage your care at  home? No (P)      Do you take prescription medications? Yes (P)      Do you have prescription coverage? Yes (P)      Coverage [employer] (P)      Do you have any problems affording any of your prescribed medications? No (P)      Is the patient taking medications as prescribed? yes (P)      Who is going to help you get home at discharge? wife (P)      How do you get to doctors appointments? car, drives self;family or friend will provide (P)      Are you on dialysis? No (P)      Do you take coumadin? No (P)      Discharge Plan A Home with family (P)      DME Needed Upon Discharge  none (P)      Discharge Plan discussed with: Spouse/sig other;Patient (P)                         Spoke with pt and wife at bedside pt states he is going home at discharge he does not use any DME no HH, house is one level with a single step into the house, wife transports,

## 2023-02-23 NOTE — PLAN OF CARE
Pt arrived to MPU 2 for thoracentesis.  Name verified using two identifiers.  Allergies verified. VSS.  Will continue to monitor.

## 2023-02-23 NOTE — ASSESSMENT & PLAN NOTE
trasnsplant kidney times 2 first one was in 1992 and second one was 2005 patient on prograf 2 mg BID and prednisone 5 mg po qday   KATINA on CKD scr at 40 today worsening from 3.8 yesterday and baseline cr is high 2.6-2.8.   Non oliguric   UA bland   Transplant renal US no hydro   Patient having difficult time urinating   No improvement with IVF cr same today at 3.8   Bicarb 16 c/w acidosis in the setting of KATINA   Euvolemic on exam   Can not rule out underlying rejection   Unlikely ATN no hemodynamic instability nor nephrotoxic agents to cause ATN . Also since didn't improve with IVF unlikely prerenal. CT chest with B/L pleural effusions     Recs:   IV lasix 40 mg BID today then will reassess tomorrow given has B/L effusions on ct chest. Also patient was taking lasix at home and was held here   Check RFP daily   po bicarb 1300 TID bicarb 18   Avoid nephrotoxins   Renally dose all meds

## 2023-02-23 NOTE — PROGRESS NOTES
During ultrasound evaluation, insufficient pleural effusion identified for safe thoracentesis No thoracentesis performed.     Kayla Burgess PA-C  Interventional Radiology  [phone]

## 2023-02-23 NOTE — PROGRESS NOTES
Patient c/o pain with pearson catheter insertion - attempted to use a smaller catheter - coude, still patient states it was too painful and requested that it be removed. Catheter removed; MD notified. Monitor patients U/O, encourage patient to stand and void.

## 2023-02-23 NOTE — ASSESSMENT & PLAN NOTE
-Unchanged prior to admission; Don't suspect acute covid  -CT chest w/o contrast showing BL pleural effusions; d/w IR no need to hold anticoagulants; thoracentesis order; shows infiltrates but I believe these are older post covid inflammatory findings  -tx underlying anemia - see below  - weaned from 4 L NC to 2 L NC  -covid + on 2/7/23; believe isolation is done; no steven hypoxia when pt sits up, 92 on RA on day of admission and on day 2 of hospital stay  - IMPROVING

## 2023-02-23 NOTE — SUBJECTIVE & OBJECTIVE
Interval History: Patient lying in bed, no acute distress. Wife at bedside. No acute events overnight. Patient reports mild improvement in SOB and has occasional cough. Weaned from 4 L NC to 2 L NC. Patient also notes urinary incontinence since indwelling pearson was attempted. No other complaints. Patient evaluated by pulmonary for possible thoracentesis but it could not be performed due to no safe fluid pocket being identified. Per nephrology, following up DSA and performing trial of IV lasix to continue management of KATINA.       Review of Systems   Constitutional:  Positive for activity change and fatigue. Negative for chills and fever.   HENT:  Positive for voice change. Negative for congestion and trouble swallowing.    Eyes:  Negative for visual disturbance.   Respiratory:  Positive for cough and shortness of breath.    Cardiovascular:  Negative for chest pain and leg swelling.   Gastrointestinal:  Negative for abdominal distention, abdominal pain, nausea and vomiting.   Endocrine: Negative for cold intolerance, heat intolerance, polydipsia and polyuria.   Genitourinary:  Negative for difficulty urinating and dysuria.        Urinary incontinence   Musculoskeletal:  Negative for back pain and myalgias.   Skin:  Negative for rash and wound.   Neurological:  Positive for weakness. Negative for dizziness and light-headedness.   Hematological:  Negative for adenopathy. Does not bruise/bleed easily.   Psychiatric/Behavioral:  Negative for confusion and sleep disturbance.      Objective:     Vital Signs (Most Recent):  Temp: 98 °F (36.7 °C) (02/23/23 1604)  Pulse: 69 (02/23/23 1604)  Resp: 20 (02/23/23 1604)  BP: (!) 144/72 (02/23/23 1604)  SpO2: 95 % (02/23/23 1630)   Vital Signs (24h Range):  Temp:  [97.8 °F (36.6 °C)-98.7 °F (37.1 °C)] 98 °F (36.7 °C)  Pulse:  [69-84] 69  Resp:  [18-20] 20  SpO2:  [90 %-97 %] 95 %  BP: (141-157)/(65-75) 144/72     Weight: 101.6 kg (224 lb)  Body mass index is 29.55  kg/m².    Intake/Output Summary (Last 24 hours) at 2/23/2023 1750  Last data filed at 2/23/2023 1718  Gross per 24 hour   Intake 788.75 ml   Output 100 ml   Net 688.75 ml      Physical Exam  Constitutional:       Appearance: He is well-developed.   HENT:      Head: Normocephalic and atraumatic.      Mouth/Throat:      Mouth: Mucous membranes are moist.      Comments: hoarseness  Eyes:      General: No scleral icterus.     Extraocular Movements: Extraocular movements intact.      Pupils: Pupils are equal, round, and reactive to light.   Neck:      Vascular: No JVD.   Cardiovascular:      Rate and Rhythm: Normal rate and regular rhythm.      Heart sounds: No murmur heard.    No friction rub. No gallop.   Pulmonary:      Effort: Pulmonary effort is normal. No respiratory distress.      Breath sounds: Wheezing and rhonchi present. No rales.   Abdominal:      General: Bowel sounds are normal. There is no distension.      Palpations: Abdomen is soft.      Tenderness: There is no abdominal tenderness.   Musculoskeletal:         General: No deformity. Normal range of motion.      Cervical back: Neck supple.   Lymphadenopathy:      Cervical: No cervical adenopathy.   Skin:     General: Skin is warm and dry.      Capillary Refill: Capillary refill takes less than 2 seconds.      Findings: No erythema or rash.   Neurological:      General: No focal deficit present.      Mental Status: He is alert and oriented to person, place, and time.      Cranial Nerves: No cranial nerve deficit.      Sensory: No sensory deficit.   Psychiatric:         Mood and Affect: Mood normal.       Significant Labs: A1C:   Recent Labs   Lab 09/08/22  0909 01/04/23  0819   HGBA1C 5.4 5.5     CBC:   Recent Labs   Lab 02/22/23  0448 02/23/23  0808   WBC 8.60 7.56   HGB 6.5* 7.3*   HCT 21.7* 24.0*    190     CMP:   Recent Labs   Lab 02/22/23  0448 02/23/23  0808   * 135*   K 4.4 4.3    106   CO2 16* 18*   GLU 75 109   BUN 39* 41*    CREATININE 3.8* 4.0*   CALCIUM 6.5* 6.7*   ALBUMIN  --  2.1*   ANIONGAP 12 11     Coagulation: No results for input(s): PT, INR, APTT in the last 48 hours.  Lactic Acid:   Recent Labs   Lab 02/22/23 2031   LACTATE 1.5       Urine Studies:   Recent Labs   Lab 02/21/23  1854   COLORU Yellow   APPEARANCEUA Clear   PHUR 6.0   SPECGRAV 1.015   PROTEINUA 1+*   GLUCUA Negative   KETONESU Negative   BILIRUBINUA Negative   OCCULTUA Negative   NITRITE Negative   LEUKOCYTESUR Negative   RBCUA 1   WBCUA 1   BACTERIA Rare   SQUAMEPITHEL 0   HYALINECASTS 0       Significant Imaging: I have reviewed all pertinent imaging results/findings within the past 24 hours.

## 2023-02-23 NOTE — ASSESSMENT & PLAN NOTE
Baseline seems to be a creatinine in the 2s   Cr still elevated unchanged at 3.8 despite IVs  US no overly revealing; Transplant working up rejection  - nephrology consulted. apprec recs  -- followup DSA  -- off IVF. Ordered IV lasix 40 mg BID x 2 doses on 2/23  -- BMP daily

## 2023-02-23 NOTE — SUBJECTIVE & OBJECTIVE
Subjective:   History of Present Illness:  67 year old man with past medical history of kidney transplant times 2 first one in 1992 and then again in 2005. Has CKD with baseline creatinine high 2s. Recently admitted earlier this month with COVID. Was admitted with KATINA with cr 3.8 on admit. Last admission when he was admitted with COVID his cr did peak at 3.8 but when he was on 2/10 his cr was 2.6 at baseline. Patient denies any diarrhea after discharge from hospital. Says that he has a hard time urinating but no dysuria, no hematuria , no flank pain , no fevers. Was on keflex for mild cellulitis. Denies NSAID use. This morning patient was wheezing and had some SOB. He was started on IVF NS with no improvement in scr this morning was 3.8    Mr. Phelps is a 67 y.o. year old male who is status post Kidney Transplant - 4/12/2005 - Not Followed  (#2).    His maintenance immunosuppression consists of:   Immunosuppressants (From admission, onward)      Start     Stop Route Frequency Ordered    02/20/23 1830  tacrolimus capsule 2 mg         -- Oral 2 times daily 02/20/23 1725            Interval History:patient feel better today. Wheezing resolved, had pearson in yesterday but was painful and removed per patient request.       Past Medical, Surgical, Family, and Social History:   Unchanged from H&P.    Scheduled Meds:   apixaban  5 mg Oral BID    aspirin  81 mg Oral Daily    atorvastatin  40 mg Oral QHS    calcium gluconate IVPB  1 g Intravenous Q1H    cephALEXin  500 mg Oral Q8H    doxazosin  4 mg Oral Q12H    famotidine  20 mg Oral Daily    flecainide  50 mg Oral Q12H    fluticasone furoate-vilanteroL  1 puff Inhalation Daily    furosemide (LASIX) injection  40 mg Intravenous Q12H    gabapentin  300 mg Oral BID    hydrALAZINE  50 mg Oral TID    metoprolol succinate  25 mg Oral Daily    mupirocin   Nasal BID    NIFEdipine  60 mg Oral BID    paricalcitoL  1 mcg Oral Daily    predniSONE  5 mg Oral Daily    sodium bicarbonate   "1,300 mg Oral Q8H    tacrolimus  2 mg Oral BID     Continuous Infusions:  PRN Meds:sodium chloride, albuterol, clonazePAM, melatonin    Intake/Output - Last 3 Shifts         02/21 0700 02/22 0659 02/22 0700 02/23 0659 02/23 0700 02/24 0659    P.O. 720 150     I.V. (mL/kg)  2190 (21.6)     Blood  298.8     Total Intake(mL/kg) 720 (7.1) 2638.7 (26)     Urine (mL/kg/hr) 650 (0.3) 600 (0.2)     Total Output 650 600     Net +70 +2038.7            Urine Occurrence  1 x              Review of Systems   All other systems reviewed and are negative.   Objective:     Vital Signs (Most Recent):  Temp: 98.7 °F (37.1 °C) (02/23/23 0830)  Pulse: 84 (02/23/23 0914)  Resp: 20 (02/23/23 0914)  BP: (!) 157/75 (02/23/23 0830)  SpO2: 97 % (02/23/23 0914)   Vital Signs (24h Range):  Temp:  [97.8 °F (36.6 °C)-98.7 °F (37.1 °C)] 98.7 °F (37.1 °C)  Pulse:  [69-84] 84  Resp:  [18-20] 20  SpO2:  [89 %-97 %] 97 %  BP: (133-157)/(65-75) 157/75     Weight: 101.6 kg (224 lb)  Height: 6' 1" (185.4 cm)  Body mass index is 29.55 kg/m².    Physical Exam  HENT:      Head: Normocephalic and atraumatic.      Mouth/Throat:      Mouth: Mucous membranes are moist.   Eyes:      Conjunctiva/sclera: Conjunctivae normal.      Pupils: Pupils are equal, round, and reactive to light.   Cardiovascular:      Rate and Rhythm: Normal rate and regular rhythm.      Pulses: Normal pulses.      Heart sounds: Normal heart sounds.   Pulmonary:      Effort: Pulmonary effort is normal.      Breath sounds: No wheezing or rales.      Comments: Prolonged expiratory phase   Abdominal:      General: Bowel sounds are normal.      Palpations: Abdomen is soft.   Musculoskeletal:         General: Normal range of motion.   Skin:     General: Skin is warm.      Capillary Refill: Capillary refill takes less than 2 seconds.   Neurological:      General: No focal deficit present.      Mental Status: He is alert and oriented to person, place, and time.   Psychiatric:         Mood and " Affect: Mood normal.       Laboratory:  CBC:   Recent Labs   Lab 02/21/23  0938 02/22/23  0448 02/23/23  0808   WBC 7.11 8.60 7.56   RBC 3.16* 2.64* 3.04*   HGB 7.6* 6.5* 7.3*   HCT 25.2* 21.7* 24.0*    200 190   MCV 80* 82 79*   MCH 24.1* 24.6* 24.0*   MCHC 30.2* 30.0* 30.4*     BMP:   Recent Labs   Lab 02/21/23  0938 02/22/23  0448 02/23/23  0808   GLU 85 75 109    135* 135*   K 4.3 4.4 4.3    107 106   CO2 20* 16* 18*   BUN 38* 39* 41*   CREATININE 3.7* 3.8* 4.0*   CALCIUM 6.9* 6.5* 6.7*       Diagnostic Results:  Reviewed

## 2023-02-23 NOTE — PROGRESS NOTES
[facility] - Intensive Care ([address] Medicine  Progress Note    Patient Name: Ibrahima Phelps  MRN: [id number]  Patient Class: IP- Inpatient   Admission Date: 2/20/2023  Length of Stay: 3 days  Attending Physician: Morris Payan MD  Primary Care Provider: Connie Magdaleno MD        Subjective:     Principal Problem:Acute-on-chronic kidney injury        HPI:  67-year-old black male transplant kidney patient being admitted for KATINA.  Patient was just discharged from a hospitalization for COVID pneumonia on 02/10/2023.  He also paid in ED visit a few days ago for what could have been a mild cellulitis/folliculitis on his foot for which he is almost done with his Keflex regimen.    Patient was instructed to come to the ED this time by the COVID surveillance nurses due to desaturation down to 88%.  When I talked with the patient and his wife in detail, they affirmed that he was not having any new or worsening shortness of breath since his discharge.  His wife did report that once the patient woke up/sat up his desaturation spontaneously resolved.  Patient denies any steven current shortness of breath or chest pain.  No nausea vomiting or flu fever like feeling.  In my discussion with the ED provider who was noted that the patient was complaining of shortness of breath but there was a concern that this likely was anxiety triggered.  Patient seems to be saturating well on room air here in the ED and chest x-ray which I personally reviewed is clear bilaterally.  Wife reports patient has been compliant with his medications.    The blood work did demonstrate an acutely elevated creatinine of 3.8 whereas his baseline is in the 2s.      Overview/Hospital Course:  67-year-old renal transplant black male was admitted for acute on chronic kidney injury.  Patient was just discharged from hospitalization for COVID pneumonia on 02/10/2023.  Presented to the ED per recommendations of the COVID surveillance team.  Did not  have any definitive acute respiratory findings to indicate acute ongoing infectious COVID, chest x-ray was clear.  However creatinine was significantly elevated up to 3.8 whereas baseline was in the 2s.  Was started on IV fluids with trivial improvement.      Interval History: Patient lying in bed, no acute distress. Wife at bedside. No acute events overnight. Patient reports mild improvement in SOB and has occasional cough. Weaned from 4 L NC to 2 L NC. Patient also notes urinary incontinence since indwelling pearson was attempted. No other complaints. Patient evaluated by pulmonary for possible thoracentesis but it could not be performed due to no safe fluid pocket being identified. Per nephrology, following up DSA and performing trial of IV lasix to continue management of KATINA.       Review of Systems   Constitutional:  Positive for activity change and fatigue. Negative for chills and fever.   HENT:  Positive for voice change. Negative for congestion and trouble swallowing.    Eyes:  Negative for visual disturbance.   Respiratory:  Positive for cough and shortness of breath.    Cardiovascular:  Negative for chest pain and leg swelling.   Gastrointestinal:  Negative for abdominal distention, abdominal pain, nausea and vomiting.   Endocrine: Negative for cold intolerance, heat intolerance, polydipsia and polyuria.   Genitourinary:  Negative for difficulty urinating and dysuria.        Urinary incontinence   Musculoskeletal:  Negative for back pain and myalgias.   Skin:  Negative for rash and wound.   Neurological:  Positive for weakness. Negative for dizziness and light-headedness.   Hematological:  Negative for adenopathy. Does not bruise/bleed easily.   Psychiatric/Behavioral:  Negative for confusion and sleep disturbance.      Objective:     Vital Signs (Most Recent):  Temp: 98 °F (36.7 °C) (02/23/23 1604)  Pulse: 69 (02/23/23 1604)  Resp: 20 (02/23/23 1604)  BP: (!) 144/72 (02/23/23 1604)  SpO2: 95 % (02/23/23  1630)   Vital Signs (24h Range):  Temp:  [97.8 °F (36.6 °C)-98.7 °F (37.1 °C)] 98 °F (36.7 °C)  Pulse:  [69-84] 69  Resp:  [18-20] 20  SpO2:  [90 %-97 %] 95 %  BP: (141-157)/(65-75) 144/72     Weight: 101.6 kg (224 lb)  Body mass index is 29.55 kg/m².    Intake/Output Summary (Last 24 hours) at 2/23/2023 1750  Last data filed at 2/23/2023 1718  Gross per 24 hour   Intake 788.75 ml   Output 100 ml   Net 688.75 ml      Physical Exam  Constitutional:       Appearance: He is well-developed.   HENT:      Head: Normocephalic and atraumatic.      Mouth/Throat:      Mouth: Mucous membranes are moist.      Comments: hoarseness  Eyes:      General: No scleral icterus.     Extraocular Movements: Extraocular movements intact.      Pupils: Pupils are equal, round, and reactive to light.   Neck:      Vascular: No JVD.   Cardiovascular:      Rate and Rhythm: Normal rate and regular rhythm.      Heart sounds: No murmur heard.    No friction rub. No gallop.   Pulmonary:      Effort: Pulmonary effort is normal. No respiratory distress.      Breath sounds: Wheezing and rhonchi present. No rales.   Abdominal:      General: Bowel sounds are normal. There is no distension.      Palpations: Abdomen is soft.      Tenderness: There is no abdominal tenderness.   Musculoskeletal:         General: No deformity. Normal range of motion.      Cervical back: Neck supple.   Lymphadenopathy:      Cervical: No cervical adenopathy.   Skin:     General: Skin is warm and dry.      Capillary Refill: Capillary refill takes less than 2 seconds.      Findings: No erythema or rash.   Neurological:      General: No focal deficit present.      Mental Status: He is alert and oriented to person, place, and time.      Cranial Nerves: No cranial nerve deficit.      Sensory: No sensory deficit.   Psychiatric:         Mood and Affect: Mood normal.       Significant Labs: A1C:   Recent Labs   Lab 09/08/22  0909 01/04/23  0819   HGBA1C 5.4 5.5     CBC:   Recent Labs    Lab 02/22/23  0448 02/23/23  0808   WBC 8.60 7.56   HGB 6.5* 7.3*   HCT 21.7* 24.0*    190     CMP:   Recent Labs   Lab 02/22/23  0448 02/23/23  0808   * 135*   K 4.4 4.3    106   CO2 16* 18*   GLU 75 109   BUN 39* 41*   CREATININE 3.8* 4.0*   CALCIUM 6.5* 6.7*   ALBUMIN  --  2.1*   ANIONGAP 12 11     Coagulation: No results for input(s): PT, INR, APTT in the last 48 hours.  Lactic Acid:   Recent Labs   Lab 02/22/23  2031   LACTATE 1.5       Urine Studies:   Recent Labs   Lab 02/21/23  1854   COLORU Yellow   APPEARANCEUA Clear   PHUR 6.0   SPECGRAV 1.015   PROTEINUA 1+*   GLUCUA Negative   KETONESU Negative   BILIRUBINUA Negative   OCCULTUA Negative   NITRITE Negative   LEUKOCYTESUR Negative   RBCUA 1   WBCUA 1   BACTERIA Rare   SQUAMEPITHEL 0   HYALINECASTS 0       Significant Imaging: I have reviewed all pertinent imaging results/findings within the past 24 hours.      Assessment/Plan:      * Acute-on-chronic kidney injury  Baseline seems to be a creatinine in the 2s   Cr still elevated unchanged at 3.8 despite IVs  US no overly revealing; Transplant working up rejection  - nephrology consulted. apprec recs  -- followup DSA  -- off IVF. Ordered IV lasix 40 mg BID x 2 doses on 2/23  -- BMP daily           Anemia of renal disease  Acute anemia  1 unit prbc ordered on 2/22  - H/H stable      Wheezing  - albuterol inhaler prn      Chronic dyspnea  -Unchanged prior to admission; Don't suspect acute covid  -CT chest w/o contrast showing BL pleural effusions; d/w IR no need to hold anticoagulants; thoracentesis order; shows infiltrates but I believe these are older post covid inflammatory findings  -tx underlying anemia - see below  - weaned from 4 L NC to 2 L NC  -covid + on 2/7/23; believe isolation is done; no steven hypoxia when pt sits up, 92 on RA on day of admission and on day 2 of hospital stay  - IMPROVING    Cellulitis of foot  Finish regimen of cephalex in a few days      Hypocalcemia  -  replete prn      Anemia due to stage 4 chronic kidney disease  Stable, monitor      Chronic diastolic heart failure  Clinically euvolemic   Initially held home dosing of p.r.n. Lasix given KATINA and did trial of IVF but no improvement in Scr  - nephrology following   - started trial of IV lasix on 2/13. followup BMP    Paroxysmal atrial fibrillation  Continue home Eliquis, flecainide, metoprolol        H/O kidney transplant  [facility] transplant service following  Routine prograf levels  Continue home Prograf 2 mg b.i.d.  Continue home prednisone      CAD (coronary artery disease)  History of stenting years ago   Continue home aspirin and statin, Toprol      Hypertension  Continue home metoprolol, nifedipine, hydralazine        VTE Risk Mitigation (From admission, onward)         Ordered     apixaban tablet 5 mg  2 times daily         02/22/23 1607                Discharge Planning   RAMU: 2/24/2023     Code Status: Prior   Is the patient medically ready for discharge?: No    Reason for patient still in hospital (select all that apply): Patient trending condition, Laboratory test, Treatment and Consult recommendations  Discharge Plan A: Home with family          Time spent in care of patient: > 35 minutes           Morris Payan MD  Department of Hospital Medicine   [facility] - Intensive Care ([place])

## 2023-02-24 LAB
ALBUMIN SERPL BCP-MCNC: 2.2 G/DL (ref 3.5–5.2)
ANION GAP SERPL CALC-SCNC: 12 MMOL/L (ref 8–16)
BASOPHILS # BLD AUTO: 0.01 K/UL (ref 0–0.2)
BASOPHILS NFR BLD: 0.1 % (ref 0–1.9)
BUN SERPL-MCNC: 41 MG/DL (ref 8–23)
CALCIUM SERPL-MCNC: 7.2 MG/DL (ref 8.7–10.5)
CHLORIDE SERPL-SCNC: 106 MMOL/L (ref 95–110)
CO2 SERPL-SCNC: 19 MMOL/L (ref 23–29)
CREAT SERPL-MCNC: 3.8 MG/DL (ref 0.5–1.4)
DIFFERENTIAL METHOD: ABNORMAL
EOSINOPHIL # BLD AUTO: 0.2 K/UL (ref 0–0.5)
EOSINOPHIL NFR BLD: 2.8 % (ref 0–8)
ERYTHROCYTE [DISTWIDTH] IN BLOOD BY AUTOMATED COUNT: 15.8 % (ref 11.5–14.5)
EST. GFR  (NO RACE VARIABLE): 16.5 ML/MIN/1.73 M^2
GLUCOSE SERPL-MCNC: 95 MG/DL (ref 70–110)
HCT VFR BLD AUTO: 24.6 % (ref 40–54)
HGB BLD-MCNC: 7.6 G/DL (ref 14–18)
IMM GRANULOCYTES # BLD AUTO: 0.08 K/UL (ref 0–0.04)
IMM GRANULOCYTES NFR BLD AUTO: 1 % (ref 0–0.5)
LYMPHOCYTES # BLD AUTO: 0.7 K/UL (ref 1–4.8)
LYMPHOCYTES NFR BLD: 9 % (ref 18–48)
MCH RBC QN AUTO: 25.2 PG (ref 27–31)
MCHC RBC AUTO-ENTMCNC: 30.9 G/DL (ref 32–36)
MCV RBC AUTO: 82 FL (ref 82–98)
MONOCYTES # BLD AUTO: 0.7 K/UL (ref 0.3–1)
MONOCYTES NFR BLD: 8.7 % (ref 4–15)
NEUTROPHILS # BLD AUTO: 6.2 K/UL (ref 1.8–7.7)
NEUTROPHILS NFR BLD: 78.4 % (ref 38–73)
NRBC BLD-RTO: 0 /100 WBC
PHOSPHATE SERPL-MCNC: 6.4 MG/DL (ref 2.7–4.5)
PLATELET # BLD AUTO: 207 K/UL (ref 150–450)
PMV BLD AUTO: 10.5 FL (ref 9.2–12.9)
POTASSIUM SERPL-SCNC: 4.1 MMOL/L (ref 3.5–5.1)
RBC # BLD AUTO: 3.01 M/UL (ref 4.6–6.2)
SODIUM SERPL-SCNC: 137 MMOL/L (ref 136–145)
TACROLIMUS BLD-MCNC: 5.6 NG/ML (ref 5–15)
WBC # BLD AUTO: 7.93 K/UL (ref 3.9–12.7)

## 2023-02-24 PROCEDURE — 12000002 HC ACUTE/MED SURGE SEMI-PRIVATE ROOM

## 2023-02-24 PROCEDURE — 99231 SBSQ HOSP IP/OBS SF/LOW 25: CPT | Mod: ,,, | Performed by: INTERNAL MEDICINE

## 2023-02-24 PROCEDURE — 36415 COLL VENOUS BLD VENIPUNCTURE: CPT | Performed by: INTERNAL MEDICINE

## 2023-02-24 PROCEDURE — 25000003 PHARM REV CODE 250: Performed by: STUDENT IN AN ORGANIZED HEALTH CARE EDUCATION/TRAINING PROGRAM

## 2023-02-24 PROCEDURE — 94640 AIRWAY INHALATION TREATMENT: CPT

## 2023-02-24 PROCEDURE — 99231 PR SUBSEQUENT HOSPITAL CARE,LEVL I: ICD-10-PCS | Mod: ,,, | Performed by: INTERNAL MEDICINE

## 2023-02-24 PROCEDURE — 63600175 PHARM REV CODE 636 W HCPCS: Performed by: HOSPITALIST

## 2023-02-24 PROCEDURE — 25000003 PHARM REV CODE 250: Performed by: HOSPITALIST

## 2023-02-24 PROCEDURE — 80197 ASSAY OF TACROLIMUS: CPT | Performed by: HOSPITALIST

## 2023-02-24 PROCEDURE — 36415 COLL VENOUS BLD VENIPUNCTURE: CPT | Performed by: HOSPITALIST

## 2023-02-24 PROCEDURE — 99900035 HC TECH TIME PER 15 MIN (STAT)

## 2023-02-24 PROCEDURE — 51798 US URINE CAPACITY MEASURE: CPT

## 2023-02-24 PROCEDURE — 85025 COMPLETE CBC W/AUTO DIFF WBC: CPT | Performed by: INTERNAL MEDICINE

## 2023-02-24 PROCEDURE — 99233 SBSQ HOSP IP/OBS HIGH 50: CPT | Mod: ,,, | Performed by: INTERNAL MEDICINE

## 2023-02-24 PROCEDURE — 99233 PR SUBSEQUENT HOSPITAL CARE,LEVL III: ICD-10-PCS | Mod: ,,, | Performed by: INTERNAL MEDICINE

## 2023-02-24 PROCEDURE — 94761 N-INVAS EAR/PLS OXIMETRY MLT: CPT

## 2023-02-24 PROCEDURE — 27000221 HC OXYGEN, UP TO 24 HOURS

## 2023-02-24 PROCEDURE — 80069 RENAL FUNCTION PANEL: CPT | Performed by: INTERNAL MEDICINE

## 2023-02-24 PROCEDURE — 25000003 PHARM REV CODE 250: Performed by: INTERNAL MEDICINE

## 2023-02-24 RX ORDER — FUROSEMIDE 20 MG/1
40 TABLET ORAL 2 TIMES DAILY
Status: DISCONTINUED | OUTPATIENT
Start: 2023-02-24 | End: 2023-02-26

## 2023-02-24 RX ORDER — OXYBUTYNIN CHLORIDE 5 MG/1
5 TABLET ORAL ONCE
Status: COMPLETED | OUTPATIENT
Start: 2023-02-24 | End: 2023-02-24

## 2023-02-24 RX ADMIN — TACROLIMUS 2 MG: 1 CAPSULE ORAL at 05:02

## 2023-02-24 RX ADMIN — HYDRALAZINE HYDROCHLORIDE 50 MG: 25 TABLET, FILM COATED ORAL at 02:02

## 2023-02-24 RX ADMIN — MUPIROCIN: 20 OINTMENT TOPICAL at 09:02

## 2023-02-24 RX ADMIN — TACROLIMUS 2 MG: 1 CAPSULE ORAL at 09:02

## 2023-02-24 RX ADMIN — CLONAZEPAM 0.5 MG: 0.5 TABLET ORAL at 09:02

## 2023-02-24 RX ADMIN — FLECAINIDE ACETATE 50 MG: 50 TABLET ORAL at 09:02

## 2023-02-24 RX ADMIN — PARICALCITOL 1 MCG: 1 CAPSULE, LIQUID FILLED ORAL at 09:02

## 2023-02-24 RX ADMIN — HYDRALAZINE HYDROCHLORIDE 50 MG: 25 TABLET, FILM COATED ORAL at 09:02

## 2023-02-24 RX ADMIN — Medication 6 MG: at 09:02

## 2023-02-24 RX ADMIN — METOPROLOL SUCCINATE 25 MG: 25 TABLET, EXTENDED RELEASE ORAL at 09:02

## 2023-02-24 RX ADMIN — NIFEDIPINE 60 MG: 30 TABLET, FILM COATED, EXTENDED RELEASE ORAL at 09:02

## 2023-02-24 RX ADMIN — FUROSEMIDE 40 MG: 20 TABLET ORAL at 05:02

## 2023-02-24 RX ADMIN — FLUTICASONE FUROATE AND VILANTEROL TRIFENATATE 1 PUFF: 100; 25 POWDER RESPIRATORY (INHALATION) at 11:02

## 2023-02-24 RX ADMIN — FAMOTIDINE 20 MG: 20 TABLET ORAL at 09:02

## 2023-02-24 RX ADMIN — GABAPENTIN 300 MG: 300 CAPSULE ORAL at 09:02

## 2023-02-24 RX ADMIN — ASPIRIN 81 MG: 81 TABLET, COATED ORAL at 09:02

## 2023-02-24 RX ADMIN — APIXABAN 5 MG: 5 TABLET, FILM COATED ORAL at 09:02

## 2023-02-24 RX ADMIN — SODIUM BICARBONATE 1300 MG: 650 TABLET ORAL at 09:02

## 2023-02-24 RX ADMIN — SODIUM BICARBONATE 1300 MG: 650 TABLET ORAL at 06:02

## 2023-02-24 RX ADMIN — CEPHALEXIN 500 MG: 500 CAPSULE ORAL at 09:02

## 2023-02-24 RX ADMIN — PREDNISONE 5 MG: 5 TABLET ORAL at 09:02

## 2023-02-24 RX ADMIN — CEPHALEXIN 500 MG: 500 CAPSULE ORAL at 06:02

## 2023-02-24 RX ADMIN — SODIUM BICARBONATE 1300 MG: 650 TABLET ORAL at 02:02

## 2023-02-24 RX ADMIN — DOXAZOSIN 4 MG: 2 TABLET ORAL at 09:02

## 2023-02-24 RX ADMIN — OXYBUTYNIN CHLORIDE 5 MG: 5 TABLET ORAL at 09:02

## 2023-02-24 RX ADMIN — ATORVASTATIN CALCIUM 40 MG: 20 TABLET, FILM COATED ORAL at 09:02

## 2023-02-24 RX ADMIN — CEPHALEXIN 500 MG: 500 CAPSULE ORAL at 02:02

## 2023-02-24 NOTE — PLAN OF CARE
Problem: Adult Inpatient Plan of Care  Goal: Plan of Care Review  Outcome: Ongoing, Progressing  Flowsheets (Taken 2/24/2023 1654)  Plan of Care Reviewed With:   patient   spouse     Problem: Fluid and Electrolyte Imbalance (Acute Kidney Injury/Impairment)  Goal: Fluid and Electrolyte Balance  Outcome: Ongoing, Progressing  Intervention: Monitor and Manage Fluid and Electrolyte Balance  Flowsheets (Taken 2/24/2023 0941)  Fluid/Electrolyte Management: (labs monitored) other (see comments)     Problem: Infection  Goal: Absence of Infection Signs and Symptoms  Outcome: Ongoing, Progressing  Intervention: Prevent or Manage Infection  Flowsheets (Taken 2/24/2023 0941)  Infection Management: aseptic technique maintained     Problem: Fall Injury Risk  Goal: Absence of Fall and Fall-Related Injury  Outcome: Ongoing, Progressing  Intervention: Identify and Manage Contributors  Flowsheets (Taken 2/24/2023 0941)  Self-Care Promotion:   independence encouraged   BADL personal objects within reach   BADL personal routines maintained

## 2023-02-24 NOTE — PLAN OF CARE
Problem: Adult Inpatient Plan of Care  Goal: Plan of Care Review  Outcome: Ongoing, Not Progressing  Goal: Optimal Comfort and Wellbeing  Outcome: Ongoing, Not Progressing     Problem: Infection  Goal: Absence of Infection Signs and Symptoms  Outcome: Ongoing, Not Progressing     AAOx4. Continues with iv diuretic. 02 titrated from 4 to 2L per nc. Calcium supplemented; level 6.7.

## 2023-02-24 NOTE — ASSESSMENT & PLAN NOTE
Baseline seems to be a creatinine in the 2s   Cr still elevated unchanged at 3.8 despite IVF. Scr increased to 4.0 --> 3.8  US no overly revealing; Transplant working up rejection  - nephrology consulted. apprec recs  -- followup DSA  -- off IVF. Ordered IV lasix 40 mg BID x 2 doses on 2/23. Transitioned to oral lasix 40 mg BID  -- BMP daily   - IMPROVING

## 2023-02-24 NOTE — PROGRESS NOTES
[facility] - Intensive Care ([facility]-)  Kidney Transplant  Progress Note      Reason for Follow-up: Reassessment of Kidney Transplant - 4/12/2005 - Not Followed  (#2) recipient and management of immunosuppression.    ORGAN: LEFT KIDNEY    Donor Type:           Subjective:   History of Present Illness:  67 year old man with past medical history of kidney transplant times 2 first one in 1992 and then again in 2005. Has CKD with baseline creatinine high 2s. Recently admitted earlier this month with COVID. Was admitted with KATINA with cr 3.8 on admit. Last admission when he was admitted with COVID his cr did peak at 3.8 but when he was on 2/10 his cr was 2.6 at baseline. Patient denies any diarrhea after discharge from hospital. Says that he has a hard time urinating but no dysuria, no hematuria , no flank pain , no fevers. Was on keflex for mild cellulitis. Denies NSAID use. This morning patient was wheezing and had some SOB. He was started on IVF NS with no improvement in scr this morning was 3.8    Mr. Phelps is a 67 y.o. year old male who is status post Kidney Transplant - 4/12/2005 - Not Followed  (#2).    His maintenance immunosuppression consists of:   Immunosuppressants (From admission, onward)      Start     Stop Route Frequency Ordered    02/20/23 1830  tacrolimus capsule 2 mg         -- Oral 2 times daily 02/20/23 1725            Interval History:  Patient feel ok this morning. Has chronic SOB at baseline. Got lasix 40 mg IV BID yesterday and made 1.4 L in last 24 hours net neg one liter cr 3.8 from 4 yesterday     Past Medical, Surgical, Family, and Social History:   Unchanged from H&P.    Scheduled Meds:   apixaban  5 mg Oral BID    aspirin  81 mg Oral Daily    atorvastatin  40 mg Oral QHS    cephALEXin  500 mg Oral Q8H    doxazosin  4 mg Oral Q12H    famotidine  20 mg Oral Daily    flecainide  50 mg Oral Q12H    fluticasone furoate-vilanteroL  1 puff Inhalation Daily    gabapentin  300 mg Oral BID  "   hydrALAZINE  50 mg Oral TID    metoprolol succinate  25 mg Oral Daily    mupirocin   Nasal BID    NIFEdipine  60 mg Oral BID    paricalcitoL  1 mcg Oral Daily    predniSONE  5 mg Oral Daily    sodium bicarbonate  1,300 mg Oral Q8H    tacrolimus  2 mg Oral BID     Continuous Infusions:  PRN Meds:sodium chloride, albuterol, clonazePAM, melatonin    Intake/Output - Last 3 Shifts         02/22 0700 02/23 0659 02/23 0700 02/24 0659 02/24 0700 02/25 0659    P.O. 150 340     I.V. (mL/kg) 2190 (21.6)      Blood 298.8      Total Intake(mL/kg) 2638.7 (26) 340 (3.3)     Urine (mL/kg/hr) 600 (0.2) 1400 (0.6)     Total Output 600 1400     Net +2038.7 -1060            Urine Occurrence 1 x 1 x              Review of Systems   All other systems reviewed and are negative.   Objective:     Vital Signs (Most Recent):  Temp: 98.6 °F (37 °C) (02/24/23 0806)  Pulse: 74 (02/24/23 0806)  Resp: 19 (02/24/23 0806)  BP: (!) 155/72 (02/24/23 0806)  SpO2: (!) 91 % (02/24/23 0806)   Vital Signs (24h Range):  Temp:  [97.9 °F (36.6 °C)-98.6 °F (37 °C)] 98.6 °F (37 °C)  Pulse:  [69-84] 74  Resp:  [18-20] 19  SpO2:  [91 %-96 %] 91 %  BP: (140-155)/(64-72) 155/72     Weight: 101.6 kg (224 lb)  Height: 6' 1" (185.4 cm)  Body mass index is 29.55 kg/m².    Physical Exam  Vitals reviewed.   Constitutional:       Appearance: Normal appearance.   HENT:      Head: Normocephalic and atraumatic.      Mouth/Throat:      Mouth: Mucous membranes are moist.   Eyes:      Conjunctiva/sclera: Conjunctivae normal.      Pupils: Pupils are equal, round, and reactive to light.   Cardiovascular:      Rate and Rhythm: Normal rate and regular rhythm.      Pulses: Normal pulses.      Heart sounds: Normal heart sounds.   Pulmonary:      Effort: Pulmonary effort is normal.      Breath sounds: Wheezing present.   Abdominal:      General: Bowel sounds are normal.      Palpations: Abdomen is soft.   Musculoskeletal:         General: Normal range of motion. "   Skin:     General: Skin is warm.      Capillary Refill: Capillary refill takes less than 2 seconds.   Neurological:      General: No focal deficit present.      Mental Status: He is alert and oriented to person, place, and time.   Psychiatric:         Mood and Affect: Mood normal.       Laboratory:  CBC:   Recent Labs   Lab 02/22/23 0448 02/23/23  0808 02/24/23  0819   WBC 8.60 7.56 7.93   RBC 2.64* 3.04* 3.01*   HGB 6.5* 7.3* 7.6*   HCT 21.7* 24.0* 24.6*    190 207   MCV 82 79* 82   MCH 24.6* 24.0* 25.2*   MCHC 30.0* 30.4* 30.9*     BMP:   Recent Labs   Lab 02/22/23 0448 02/23/23  0808 02/24/23  0819   GLU 75 109 95   * 135* 137   K 4.4 4.3 4.1    106 106   CO2 16* 18* 19*   BUN 39* 41* 41*   CREATININE 3.8* 4.0* 3.8*   CALCIUM 6.5* 6.7* 7.2*       Diagnostic Results:  Reviewed     Assessment/Plan:     * Acute-on-chronic kidney injury  See kidney transplant       Anemia of renal disease  hgb 6.5 today   Patient denies any bleeding no melena nor bleeding per rectum   Being transfused today per primary team   Management per primary team       Wheezing  CT chest ordered by primary team   Management per primary ??Nebs       Cellulitis of foot  On keflex per primary team       Hypocalcemia  Improved   Replete calcium per primary       H/O kidney transplant  trasnsplant kidney times 2 first one was in 1992 and second one was 2005 patient on prograf 2 mg BID and prednisone 5 mg po qday   KATINA on CKD scr at 3.8  today down from 4 yesterday and baseline cr is high 2.6-2.8.   Non oliguric   UA bland   Transplant renal US no hydro   Patient having difficult time urinating   No improvement with IVF cr same today at 3.8   Bicarb 16 c/w acidosis in the setting of KATINA   Euvolemic on exam   Can not rule out underlying rejection   Unlikely ATN no hemodynamic instability nor nephrotoxic agents to cause ATN . Also since didn't improve with IVF unlikely prerenal. CT chest with B/L pleural effusions     Recs:    S/p lasix 40 mg BID yesterday 1.4L urine output and net neg 1L. Scr 3.8 today from 4 yesterday can start po lasix 40 mg bid. Patient did not get thoracentesis yesterday per IR not enough fluid to perform safe thoracentesis   Check RFP daily   po bicarb 1300 TID   Avoid nephrotoxins   Renally dose all meds         Long-term use of immunosuppressant medication  On FK 2 mg bid. Level 5.6  today at goal    On prednisone 5 mg po qday               Billy Sarabia MD  Kidney Transplant  [facility] - Intensive Care ([facility]-)

## 2023-02-24 NOTE — SUBJECTIVE & OBJECTIVE
Subjective:   History of Present Illness:  67 year old man with past medical history of kidney transplant times 2 first one in 1992 and then again in 2005. Has CKD with baseline creatinine high 2s. Recently admitted earlier this month with COVID. Was admitted with KATINA with cr 3.8 on admit. Last admission when he was admitted with COVID his cr did peak at 3.8 but when he was on 2/10 his cr was 2.6 at baseline. Patient denies any diarrhea after discharge from hospital. Says that he has a hard time urinating but no dysuria, no hematuria , no flank pain , no fevers. Was on keflex for mild cellulitis. Denies NSAID use. This morning patient was wheezing and had some SOB. He was started on IVF NS with no improvement in scr this morning was 3.8    Mr. Phelps is a 67 y.o. year old male who is status post Kidney Transplant - 4/12/2005 - Not Followed  (#2).    His maintenance immunosuppression consists of:   Immunosuppressants (From admission, onward)      Start     Stop Route Frequency Ordered    02/20/23 1830  tacrolimus capsule 2 mg         -- Oral 2 times daily 02/20/23 1725            Interval History:  Patient feel ok this morning. Has chronic SOB at baseline. Got lasix 40 mg IV BID yesterday and made 1.4 L in last 24 hours net neg one liter cr 3.8 from 4 yesterday     Past Medical, Surgical, Family, and Social History:   Unchanged from H&P.    Scheduled Meds:   apixaban  5 mg Oral BID    aspirin  81 mg Oral Daily    atorvastatin  40 mg Oral QHS    cephALEXin  500 mg Oral Q8H    doxazosin  4 mg Oral Q12H    famotidine  20 mg Oral Daily    flecainide  50 mg Oral Q12H    fluticasone furoate-vilanteroL  1 puff Inhalation Daily    gabapentin  300 mg Oral BID    hydrALAZINE  50 mg Oral TID    metoprolol succinate  25 mg Oral Daily    mupirocin   Nasal BID    NIFEdipine  60 mg Oral BID    paricalcitoL  1 mcg Oral Daily    predniSONE  5 mg Oral Daily    sodium bicarbonate  1,300 mg Oral Q8H    tacrolimus  2 mg Oral BID  "    Continuous Infusions:  PRN Meds:sodium chloride, albuterol, clonazePAM, melatonin    Intake/Output - Last 3 Shifts         02/22 0700 02/23 0659 02/23 0700 02/24 0659 02/24 0700 02/25 0659    P.O. 150 340     I.V. (mL/kg) 2190 (21.6)      Blood 298.8      Total Intake(mL/kg) 2638.7 (26) 340 (3.3)     Urine (mL/kg/hr) 600 (0.2) 1400 (0.6)     Total Output 600 1400     Net +2038.7 -1060            Urine Occurrence 1 x 1 x              Review of Systems   All other systems reviewed and are negative.   Objective:     Vital Signs (Most Recent):  Temp: 98.6 °F (37 °C) (02/24/23 0806)  Pulse: 74 (02/24/23 0806)  Resp: 19 (02/24/23 0806)  BP: (!) 155/72 (02/24/23 0806)  SpO2: (!) 91 % (02/24/23 0806)   Vital Signs (24h Range):  Temp:  [97.9 °F (36.6 °C)-98.6 °F (37 °C)] 98.6 °F (37 °C)  Pulse:  [69-84] 74  Resp:  [18-20] 19  SpO2:  [91 %-96 %] 91 %  BP: (140-155)/(64-72) 155/72     Weight: 101.6 kg (224 lb)  Height: 6' 1" (185.4 cm)  Body mass index is 29.55 kg/m².    Physical Exam  Vitals reviewed.   Constitutional:       Appearance: Normal appearance.   HENT:      Head: Normocephalic and atraumatic.      Mouth/Throat:      Mouth: Mucous membranes are moist.   Eyes:      Conjunctiva/sclera: Conjunctivae normal.      Pupils: Pupils are equal, round, and reactive to light.   Cardiovascular:      Rate and Rhythm: Normal rate and regular rhythm.      Pulses: Normal pulses.      Heart sounds: Normal heart sounds.   Pulmonary:      Effort: Pulmonary effort is normal.      Breath sounds: Wheezing present.   Abdominal:      General: Bowel sounds are normal.      Palpations: Abdomen is soft.   Musculoskeletal:         General: Normal range of motion.   Skin:     General: Skin is warm.      Capillary Refill: Capillary refill takes less than 2 seconds.   Neurological:      General: No focal deficit present.      Mental Status: He is alert and oriented to person, place, and time.   Psychiatric:         Mood and Affect: Mood " normal.       Laboratory:  CBC:   Recent Labs   Lab 02/22/23 0448 02/23/23  0808 02/24/23  0819   WBC 8.60 7.56 7.93   RBC 2.64* 3.04* 3.01*   HGB 6.5* 7.3* 7.6*   HCT 21.7* 24.0* 24.6*    190 207   MCV 82 79* 82   MCH 24.6* 24.0* 25.2*   MCHC 30.0* 30.4* 30.9*     BMP:   Recent Labs   Lab 02/22/23 0448 02/23/23  0808 02/24/23  0819   GLU 75 109 95   * 135* 137   K 4.4 4.3 4.1    106 106   CO2 16* 18* 19*   BUN 39* 41* 41*   CREATININE 3.8* 4.0* 3.8*   CALCIUM 6.5* 6.7* 7.2*       Diagnostic Results:  Reviewed

## 2023-02-24 NOTE — ASSESSMENT & PLAN NOTE
Stable, monitor     Carac Counseling:  I discussed with the patient the risks of Carac including but not limited to erythema, scaling, itching, weeping, crusting, and pain.

## 2023-02-24 NOTE — ASSESSMENT & PLAN NOTE
trasnsplant kidney times 2 first one was in 1992 and second one was 2005 patient on prograf 2 mg BID and prednisone 5 mg po qday   KATINA on CKD scr at 3.8  today down from 4 yesterday and baseline cr is high 2.6-2.8.   Non oliguric   UA bland   Transplant renal US no hydro   Patient having difficult time urinating   No improvement with IVF cr same today at 3.8   Bicarb 16 c/w acidosis in the setting of KATINA   Euvolemic on exam   Can not rule out underlying rejection   Unlikely ATN no hemodynamic instability nor nephrotoxic agents to cause ATN . Also since didn't improve with IVF unlikely prerenal. CT chest with B/L pleural effusions     Recs:   S/p lasix 40 mg BID yesterday 1.4L urine output and net neg 1L. Scr 3.8 today from 4 yesterday can start po lasix 40 mg bid. Patient did not get thoracentesis yesterday per IR not enough fluid to perform safe thoracentesis   Check RFP daily   po bicarb 1300 TID   Avoid nephrotoxins   Renally dose all meds

## 2023-02-24 NOTE — ASSESSMENT & PLAN NOTE
- Initially held home dosing of p.r.n. Lasix given KATINA and did trial of IVF but no improvement in Scr  - nephrology following   - started trial of IV lasix on 2/23 with improvement in Scr. Transitioned to oral lasix 40 mg BID  - continue home metoprolol  - followup BMP

## 2023-02-24 NOTE — PROGRESS NOTES
[facility] - Intensive Care ([address] Medicine  Progress Note    Patient Name: Ibrahima Phelps  MRN: [id number]  Patient Class: IP- Inpatient   Admission Date: 2/20/2023  Length of Stay: 4 days  Attending Physician: Morris Payan MD  Primary Care Provider: Connie Magdaleno MD        Subjective:     Principal Problem:Acute-on-chronic kidney injury        HPI:  67-year-old black male transplant kidney patient being admitted for KATINA.  Patient was just discharged from a hospitalization for COVID pneumonia on 02/10/2023.  He also paid in ED visit a few days ago for what could have been a mild cellulitis/folliculitis on his foot for which he is almost done with his Keflex regimen.    Patient was instructed to come to the ED this time by the COVID surveillance nurses due to desaturation down to 88%.  When I talked with the patient and his wife in detail, they affirmed that he was not having any new or worsening shortness of breath since his discharge.  His wife did report that once the patient woke up/sat up his desaturation spontaneously resolved.  Patient denies any steven current shortness of breath or chest pain.  No nausea vomiting or flu fever like feeling.  In my discussion with the ED provider who was noted that the patient was complaining of shortness of breath but there was a concern that this likely was anxiety triggered.  Patient seems to be saturating well on room air here in the ED and chest x-ray which I personally reviewed is clear bilaterally.  Wife reports patient has been compliant with his medications.    The blood work did demonstrate an acutely elevated creatinine of 3.8 whereas his baseline is in the 2s.      Overview/Hospital Course:  67-year-old renal transplant black male was admitted for acute on chronic kidney injury.  Patient was just discharged from hospitalization for COVID pneumonia on 02/10/2023.  Presented to the ED per recommendations of the COVID surveillance team.  Did not  have any definitive acute respiratory findings to indicate acute ongoing infectious COVID, chest x-ray was clear.  However creatinine was significantly elevated up to 3.8 whereas baseline was in the 2s.  Was started on IV fluids with trivial improvement.      Interval History: Patient lying in bed, no acute distress. Wife at bedside. No acute events overnight. Patient reports steady improvement in SOB and cough. Good urin output with condom cath in place. Unable to perform thoracentesis on 2/23 due to insufficient fluid to perform safe thoracentesis. Per nephrology, following up DSA and transitioning to oral lasix given good response to IV lasix and Scr downtrending. Continuing on home prograf and prednisone. On 3 L NC. Weaning O2 as tolerated with supportive care.      Intake/Output Summary (Last 24 hours) at 2/24/2023 1312  Last data filed at 2/24/2023 0641  Gross per 24 hour   Intake 222 ml   Output 1400 ml   Net -1178 ml         Review of Systems   Constitutional:  Positive for activity change and fatigue. Negative for chills and fever.   HENT:  Positive for voice change. Negative for congestion and trouble swallowing.    Eyes:  Negative for visual disturbance.   Respiratory:  Positive for cough and shortness of breath.    Cardiovascular:  Negative for chest pain and leg swelling.   Gastrointestinal:  Negative for abdominal distention, abdominal pain, nausea and vomiting.   Endocrine: Negative for cold intolerance, heat intolerance, polydipsia and polyuria.   Genitourinary:  Negative for difficulty urinating and dysuria.        Urinary incontinence   Musculoskeletal:  Negative for back pain and myalgias.   Skin:  Negative for rash and wound.   Neurological:  Positive for weakness. Negative for dizziness and light-headedness.   Hematological:  Negative for adenopathy. Does not bruise/bleed easily.   Psychiatric/Behavioral:  Negative for confusion and sleep disturbance.      Objective:     Vital Signs (Most  Recent):  Temp: 98.1 °F (36.7 °C) (02/24/23 1130)  Pulse: 70 (02/24/23 1130)  Resp: 18 (02/24/23 1130)  BP: (!) 141/66 (02/24/23 1130)  SpO2: (!) 94 % (02/24/23 1123)   Vital Signs (24h Range):  Temp:  [97.9 °F (36.6 °C)-98.6 °F (37 °C)] 98.1 °F (36.7 °C)  Pulse:  [69-84] 70  Resp:  [18-20] 18  SpO2:  [91 %-96 %] 94 %  BP: (140-155)/(64-72) 141/66     Weight: 101.6 kg (224 lb)  Body mass index is 29.55 kg/m².    Intake/Output Summary (Last 24 hours) at 2/24/2023 1310  Last data filed at 2/24/2023 0641  Gross per 24 hour   Intake 222 ml   Output 1400 ml   Net -1178 ml        Physical Exam  Constitutional:       Appearance: He is well-developed.   HENT:      Head: Normocephalic and atraumatic.      Mouth/Throat:      Mouth: Mucous membranes are moist.      Comments: hoarseness  Eyes:      General: No scleral icterus.     Extraocular Movements: Extraocular movements intact.      Pupils: Pupils are equal, round, and reactive to light.   Neck:      Vascular: No JVD.   Cardiovascular:      Rate and Rhythm: Normal rate and regular rhythm.      Heart sounds: No murmur heard.    No friction rub. No gallop.   Pulmonary:      Effort: Pulmonary effort is normal. No respiratory distress.      Breath sounds: Examination of the right-lower field reveals decreased breath sounds. Examination of the left-lower field reveals decreased breath sounds. Decreased breath sounds, wheezing and rhonchi present. No rales.   Abdominal:      General: Bowel sounds are normal. There is no distension.      Palpations: Abdomen is soft.      Tenderness: There is no abdominal tenderness.   Musculoskeletal:         General: No deformity. Normal range of motion.      Cervical back: Neck supple.   Lymphadenopathy:      Cervical: No cervical adenopathy.   Skin:     General: Skin is warm and dry.      Capillary Refill: Capillary refill takes less than 2 seconds.      Findings: No erythema or rash.   Neurological:      General: No focal deficit present.       Mental Status: He is alert and oriented to person, place, and time.      Cranial Nerves: No cranial nerve deficit.      Sensory: No sensory deficit.   Psychiatric:         Mood and Affect: Mood normal.       Significant Labs: A1C:   Recent Labs   Lab 09/08/22  0909 01/04/23  0819   HGBA1C 5.4 5.5       CBC:   Recent Labs   Lab 02/23/23  0808 02/24/23  0819   WBC 7.56 7.93   HGB 7.3* 7.6*   HCT 24.0* 24.6*    207       CMP:   Recent Labs   Lab 02/23/23  0808 02/24/23  0819   * 137   K 4.3 4.1    106   CO2 18* 19*    95   BUN 41* 41*   CREATININE 4.0* 3.8*   CALCIUM 6.7* 7.2*   ALBUMIN 2.1* 2.2*   ANIONGAP 11 12       Coagulation: No results for input(s): PT, INR, APTT in the last 48 hours.  Lactic Acid:   Recent Labs   Lab 02/22/23 2031   LACTATE 1.5             Significant Imaging: I have reviewed all pertinent imaging results/findings within the past 24 hours.      Assessment/Plan:      * Acute-on-chronic kidney injury  Baseline seems to be a creatinine in the 2s   Cr still elevated unchanged at 3.8 despite IVF. Scr increased to 4.0 --> 3.8  US no overly revealing; Transplant working up rejection  - nephrology consulted. apprec recs  -- followup DSA  -- off IVF. Ordered IV lasix 40 mg BID x 2 doses on 2/23. Transitioned to oral lasix 40 mg BID  -- BMP daily   - IMPROVING          Anemia of renal disease  Acute anemia  1 unit prbc ordered on 2/22  - H/H stable      Wheezing  - albuterol inhaler prn      Chronic dyspnea  -Unchanged prior to admission; Don't suspect acute covid  -CT chest w/o contrast showing BL pleural effusions; d/w IR no need to hold anticoagulants; thoracentesis order; shows infiltrates but I believe these are older post covid inflammatory findings  -tx underlying anemia - see below  - weaned from 4 L NC to 2 L NC and now up to 3 L NC  - continue supportive care: albuterol prn, incentive spirometry, anti-tussive meds, chest PT, acapella, OOB to chair  -covid + on  2/7/23  - IMPROVING    Cellulitis of foot  Finish regimen of cephalex in a few days      Hypocalcemia  - replete prn      Anemia due to stage 4 chronic kidney disease  Stable, monitor      Chronic diastolic heart failure  - Initially held home dosing of p.r.n. Lasix given KATINA and did trial of IVF but no improvement in Scr  - nephrology following   - started trial of IV lasix on 2/23 with improvement in Scr. Transitioned to oral lasix 40 mg BID  - continue home metoprolol  - followup BMP    Paroxysmal atrial fibrillation  Continue home Eliquis, flecainide, metoprolol        H/O kidney transplant  K transplant service following  Routine prograf levels  Continue home Prograf 2 mg b.i.d.  Continue home prednisone      CAD (coronary artery disease)  History of stenting years ago   Continue home aspirin and statin, Toprol      Hypertension  Continue home metoprolol, nifedipine, hydralazine        VTE Risk Mitigation (From admission, onward)         Ordered     apixaban tablet 5 mg  2 times daily         02/22/23 1607                Discharge Planning   RAMU: 2/24/2023     Code Status: Prior   Is the patient medically ready for discharge?: No    Reason for patient still in hospital (select all that apply): Patient unstable, Patient trending condition, Laboratory test, Treatment and Consult recommendations  Discharge Plan A: Home with family            Time spent in care of patient: > 35 minutes       Morris Payan MD  Department of Hospital Medicine   [facility] - Intensive Care ([place]-[facility])

## 2023-02-24 NOTE — ASSESSMENT & PLAN NOTE
-Unchanged prior to admission; Don't suspect acute covid  -CT chest w/o contrast showing BL pleural effusions; d/w IR no need to hold anticoagulants; thoracentesis order; shows infiltrates but I believe these are older post covid inflammatory findings  -tx underlying anemia - see below  - weaned from 4 L NC to 2 L NC and now up to 3 L NC  - continue supportive care: albuterol prn, incentive spirometry, anti-tussive meds, chest PT, acapella, OOB to chair  -covid + on 2/7/23  - IMPROVING

## 2023-02-24 NOTE — SUBJECTIVE & OBJECTIVE
Interval History: Patient lying in bed, no acute distress. Wife at bedside. No acute events overnight. Patient reports steady improvement in SOB and cough. Good urin output with condom cath in place. Unable to perform thoracentesis on 2/23 due to insufficient fluid to perform safe thoracentesis. Per nephrology, following up DSA and transitioning to oral lasix given good response to IV lasix and Scr downtrending. Continuing on home prograf and prednisone. On 3 L NC. Weaning O2 as tolerated with supportive care.      Intake/Output Summary (Last 24 hours) at 2/24/2023 1312  Last data filed at 2/24/2023 0641  Gross per 24 hour   Intake 222 ml   Output 1400 ml   Net -1178 ml         Review of Systems   Constitutional:  Positive for activity change and fatigue. Negative for chills and fever.   HENT:  Positive for voice change. Negative for congestion and trouble swallowing.    Eyes:  Negative for visual disturbance.   Respiratory:  Positive for cough and shortness of breath.    Cardiovascular:  Negative for chest pain and leg swelling.   Gastrointestinal:  Negative for abdominal distention, abdominal pain, nausea and vomiting.   Endocrine: Negative for cold intolerance, heat intolerance, polydipsia and polyuria.   Genitourinary:  Negative for difficulty urinating and dysuria.        Urinary incontinence   Musculoskeletal:  Negative for back pain and myalgias.   Skin:  Negative for rash and wound.   Neurological:  Positive for weakness. Negative for dizziness and light-headedness.   Hematological:  Negative for adenopathy. Does not bruise/bleed easily.   Psychiatric/Behavioral:  Negative for confusion and sleep disturbance.      Objective:     Vital Signs (Most Recent):  Temp: 98.1 °F (36.7 °C) (02/24/23 1130)  Pulse: 70 (02/24/23 1130)  Resp: 18 (02/24/23 1130)  BP: (!) 141/66 (02/24/23 1130)  SpO2: (!) 94 % (02/24/23 1123)   Vital Signs (24h Range):  Temp:  [97.9 °F (36.6 °C)-98.6 °F (37 °C)] 98.1 °F (36.7 °C)  Pulse:   [69-84] 70  Resp:  [18-20] 18  SpO2:  [91 %-96 %] 94 %  BP: (140-155)/(64-72) 141/66     Weight: 101.6 kg (224 lb)  Body mass index is 29.55 kg/m².    Intake/Output Summary (Last 24 hours) at 2/24/2023 1310  Last data filed at 2/24/2023 0641  Gross per 24 hour   Intake 222 ml   Output 1400 ml   Net -1178 ml        Physical Exam  Constitutional:       Appearance: He is well-developed.   HENT:      Head: Normocephalic and atraumatic.      Mouth/Throat:      Mouth: Mucous membranes are moist.      Comments: hoarseness  Eyes:      General: No scleral icterus.     Extraocular Movements: Extraocular movements intact.      Pupils: Pupils are equal, round, and reactive to light.   Neck:      Vascular: No JVD.   Cardiovascular:      Rate and Rhythm: Normal rate and regular rhythm.      Heart sounds: No murmur heard.    No friction rub. No gallop.   Pulmonary:      Effort: Pulmonary effort is normal. No respiratory distress.      Breath sounds: Examination of the right-lower field reveals decreased breath sounds. Examination of the left-lower field reveals decreased breath sounds. Decreased breath sounds, wheezing and rhonchi present. No rales.   Abdominal:      General: Bowel sounds are normal. There is no distension.      Palpations: Abdomen is soft.      Tenderness: There is no abdominal tenderness.   Musculoskeletal:         General: No deformity. Normal range of motion.      Cervical back: Neck supple.   Lymphadenopathy:      Cervical: No cervical adenopathy.   Skin:     General: Skin is warm and dry.      Capillary Refill: Capillary refill takes less than 2 seconds.      Findings: No erythema or rash.   Neurological:      General: No focal deficit present.      Mental Status: He is alert and oriented to person, place, and time.      Cranial Nerves: No cranial nerve deficit.      Sensory: No sensory deficit.   Psychiatric:         Mood and Affect: Mood normal.       Significant Labs: A1C:   Recent Labs   Lab  09/08/22  0909 01/04/23  0819   HGBA1C 5.4 5.5       CBC:   Recent Labs   Lab 02/23/23  0808 02/24/23  0819   WBC 7.56 7.93   HGB 7.3* 7.6*   HCT 24.0* 24.6*    207       CMP:   Recent Labs   Lab 02/23/23  0808 02/24/23  0819   * 137   K 4.3 4.1    106   CO2 18* 19*    95   BUN 41* 41*   CREATININE 4.0* 3.8*   CALCIUM 6.7* 7.2*   ALBUMIN 2.1* 2.2*   ANIONGAP 11 12       Coagulation: No results for input(s): PT, INR, APTT in the last 48 hours.  Lactic Acid:   Recent Labs   Lab 02/22/23 2031   LACTATE 1.5             Significant Imaging: I have reviewed all pertinent imaging results/findings within the past 24 hours.

## 2023-02-25 LAB
ANION GAP SERPL CALC-SCNC: 11 MMOL/L (ref 8–16)
BUN SERPL-MCNC: 46 MG/DL (ref 8–23)
CALCIUM SERPL-MCNC: 7.3 MG/DL (ref 8.7–10.5)
CHLORIDE SERPL-SCNC: 103 MMOL/L (ref 95–110)
CO2 SERPL-SCNC: 21 MMOL/L (ref 23–29)
CREAT SERPL-MCNC: 3.6 MG/DL (ref 0.5–1.4)
ERYTHROCYTE [DISTWIDTH] IN BLOOD BY AUTOMATED COUNT: 15.9 % (ref 11.5–14.5)
EST. GFR  (NO RACE VARIABLE): 17.6 ML/MIN/1.73 M^2
GLUCOSE SERPL-MCNC: 113 MG/DL (ref 70–110)
HCT VFR BLD AUTO: 23.1 % (ref 40–54)
HGB BLD-MCNC: 7 G/DL (ref 14–18)
MAGNESIUM SERPL-MCNC: 2.4 MG/DL (ref 1.6–2.6)
MCH RBC QN AUTO: 23.9 PG (ref 27–31)
MCHC RBC AUTO-ENTMCNC: 30.3 G/DL (ref 32–36)
MCV RBC AUTO: 79 FL (ref 82–98)
PHOSPHATE SERPL-MCNC: 6.4 MG/DL (ref 2.7–4.5)
PLATELET # BLD AUTO: 213 K/UL (ref 150–450)
PMV BLD AUTO: 10.6 FL (ref 9.2–12.9)
POTASSIUM SERPL-SCNC: 4.8 MMOL/L (ref 3.5–5.1)
RBC # BLD AUTO: 2.93 M/UL (ref 4.6–6.2)
SODIUM SERPL-SCNC: 135 MMOL/L (ref 136–145)
TACROLIMUS BLD-MCNC: 3 NG/ML (ref 5–15)
WBC # BLD AUTO: 8.11 K/UL (ref 3.9–12.7)

## 2023-02-25 PROCEDURE — 25000003 PHARM REV CODE 250: Performed by: INTERNAL MEDICINE

## 2023-02-25 PROCEDURE — 99233 PR SUBSEQUENT HOSPITAL CARE,LEVL III: ICD-10-PCS | Mod: ,,, | Performed by: INTERNAL MEDICINE

## 2023-02-25 PROCEDURE — 85027 COMPLETE CBC AUTOMATED: CPT | Performed by: INTERNAL MEDICINE

## 2023-02-25 PROCEDURE — 25000003 PHARM REV CODE 250: Performed by: HOSPITALIST

## 2023-02-25 PROCEDURE — 94640 AIRWAY INHALATION TREATMENT: CPT

## 2023-02-25 PROCEDURE — 84100 ASSAY OF PHOSPHORUS: CPT | Performed by: INTERNAL MEDICINE

## 2023-02-25 PROCEDURE — 80197 ASSAY OF TACROLIMUS: CPT | Performed by: HOSPITALIST

## 2023-02-25 PROCEDURE — 83735 ASSAY OF MAGNESIUM: CPT | Performed by: INTERNAL MEDICINE

## 2023-02-25 PROCEDURE — 12000002 HC ACUTE/MED SURGE SEMI-PRIVATE ROOM

## 2023-02-25 PROCEDURE — 36415 COLL VENOUS BLD VENIPUNCTURE: CPT | Performed by: INTERNAL MEDICINE

## 2023-02-25 PROCEDURE — 63600175 PHARM REV CODE 636 W HCPCS: Performed by: HOSPITALIST

## 2023-02-25 PROCEDURE — 99233 SBSQ HOSP IP/OBS HIGH 50: CPT | Mod: ,,, | Performed by: INTERNAL MEDICINE

## 2023-02-25 PROCEDURE — 36415 COLL VENOUS BLD VENIPUNCTURE: CPT | Performed by: HOSPITALIST

## 2023-02-25 PROCEDURE — 25000003 PHARM REV CODE 250: Performed by: STUDENT IN AN ORGANIZED HEALTH CARE EDUCATION/TRAINING PROGRAM

## 2023-02-25 PROCEDURE — 27000221 HC OXYGEN, UP TO 24 HOURS

## 2023-02-25 PROCEDURE — 63600175 PHARM REV CODE 636 W HCPCS: Performed by: INTERNAL MEDICINE

## 2023-02-25 PROCEDURE — 99900035 HC TECH TIME PER 15 MIN (STAT)

## 2023-02-25 PROCEDURE — 94668 MNPJ CHEST WALL SBSQ: CPT

## 2023-02-25 PROCEDURE — 94761 N-INVAS EAR/PLS OXIMETRY MLT: CPT

## 2023-02-25 PROCEDURE — 80048 BASIC METABOLIC PNL TOTAL CA: CPT | Performed by: INTERNAL MEDICINE

## 2023-02-25 RX ORDER — CALCIUM ACETATE 667 MG/1
1334 CAPSULE ORAL
Status: DISCONTINUED | OUTPATIENT
Start: 2023-02-25 | End: 2023-03-06 | Stop reason: HOSPADM

## 2023-02-25 RX ADMIN — ASPIRIN 81 MG: 81 TABLET, COATED ORAL at 09:02

## 2023-02-25 RX ADMIN — PREDNISONE 5 MG: 5 TABLET ORAL at 09:02

## 2023-02-25 RX ADMIN — GABAPENTIN 300 MG: 300 CAPSULE ORAL at 09:02

## 2023-02-25 RX ADMIN — NIFEDIPINE 60 MG: 30 TABLET, FILM COATED, EXTENDED RELEASE ORAL at 09:02

## 2023-02-25 RX ADMIN — PARICALCITOL 1 MCG: 1 CAPSULE, LIQUID FILLED ORAL at 09:02

## 2023-02-25 RX ADMIN — APIXABAN 5 MG: 5 TABLET, FILM COATED ORAL at 10:02

## 2023-02-25 RX ADMIN — ALBUTEROL SULFATE 2 PUFF: 108 INHALANT RESPIRATORY (INHALATION) at 09:02

## 2023-02-25 RX ADMIN — HYDRALAZINE HYDROCHLORIDE 50 MG: 25 TABLET, FILM COATED ORAL at 10:02

## 2023-02-25 RX ADMIN — DOXAZOSIN 4 MG: 2 TABLET ORAL at 10:02

## 2023-02-25 RX ADMIN — CEPHALEXIN 500 MG: 500 CAPSULE ORAL at 02:02

## 2023-02-25 RX ADMIN — HYDRALAZINE HYDROCHLORIDE 50 MG: 25 TABLET, FILM COATED ORAL at 02:02

## 2023-02-25 RX ADMIN — MUPIROCIN: 20 OINTMENT TOPICAL at 10:02

## 2023-02-25 RX ADMIN — MUPIROCIN: 20 OINTMENT TOPICAL at 02:02

## 2023-02-25 RX ADMIN — Medication 6 MG: at 10:02

## 2023-02-25 RX ADMIN — CALCIUM ACETATE 1334 MG: 667 CAPSULE ORAL at 05:02

## 2023-02-25 RX ADMIN — APIXABAN 5 MG: 5 TABLET, FILM COATED ORAL at 09:02

## 2023-02-25 RX ADMIN — CLONAZEPAM 0.5 MG: 0.5 TABLET ORAL at 10:02

## 2023-02-25 RX ADMIN — SODIUM BICARBONATE 1300 MG: 650 TABLET ORAL at 02:02

## 2023-02-25 RX ADMIN — FUROSEMIDE 40 MG: 20 TABLET ORAL at 09:02

## 2023-02-25 RX ADMIN — FAMOTIDINE 20 MG: 20 TABLET ORAL at 09:02

## 2023-02-25 RX ADMIN — DOXAZOSIN 4 MG: 2 TABLET ORAL at 09:02

## 2023-02-25 RX ADMIN — TACROLIMUS 2 MG: 1 CAPSULE ORAL at 09:02

## 2023-02-25 RX ADMIN — SODIUM BICARBONATE 1300 MG: 650 TABLET ORAL at 05:02

## 2023-02-25 RX ADMIN — CEPHALEXIN 500 MG: 500 CAPSULE ORAL at 10:02

## 2023-02-25 RX ADMIN — FLECAINIDE ACETATE 50 MG: 50 TABLET ORAL at 09:02

## 2023-02-25 RX ADMIN — FLUTICASONE FUROATE AND VILANTEROL TRIFENATATE 1 PUFF: 100; 25 POWDER RESPIRATORY (INHALATION) at 09:02

## 2023-02-25 RX ADMIN — SODIUM BICARBONATE 1300 MG: 650 TABLET ORAL at 10:02

## 2023-02-25 RX ADMIN — GABAPENTIN 300 MG: 300 CAPSULE ORAL at 10:02

## 2023-02-25 RX ADMIN — CEPHALEXIN 500 MG: 500 CAPSULE ORAL at 05:02

## 2023-02-25 RX ADMIN — ATORVASTATIN CALCIUM 40 MG: 20 TABLET, FILM COATED ORAL at 10:02

## 2023-02-25 RX ADMIN — METOPROLOL SUCCINATE 25 MG: 25 TABLET, EXTENDED RELEASE ORAL at 09:02

## 2023-02-25 RX ADMIN — HYDRALAZINE HYDROCHLORIDE 50 MG: 25 TABLET, FILM COATED ORAL at 09:02

## 2023-02-25 RX ADMIN — TACROLIMUS 2.5 MG: 1 CAPSULE ORAL at 05:02

## 2023-02-25 RX ADMIN — CLONAZEPAM 0.5 MG: 0.5 TABLET ORAL at 05:02

## 2023-02-25 RX ADMIN — NIFEDIPINE 60 MG: 30 TABLET, FILM COATED, EXTENDED RELEASE ORAL at 10:02

## 2023-02-25 RX ADMIN — FUROSEMIDE 40 MG: 20 TABLET ORAL at 05:02

## 2023-02-25 NOTE — PROGRESS NOTES
[facility] ([address] Medicine  Progress Note    Patient Name: Ibrahima Phelps  MRN: [id number]  Patient Class: IP- Inpatient   Admission Date: 2/20/2023  Length of Stay: 5 days  Attending Physician: Morris Payan MD  Primary Care Provider: Connie Magdaleno MD        Subjective:     Principal Problem:Acute-on-chronic kidney injury        HPI:  67-year-old black male transplant kidney patient being admitted for KATINA.  Patient was just discharged from a hospitalization for COVID pneumonia on 02/10/2023.  He also paid in ED visit a few days ago for what could have been a mild cellulitis/folliculitis on his foot for which he is almost done with his Keflex regimen.    Patient was instructed to come to the ED this time by the COVID surveillance nurses due to desaturation down to 88%.  When I talked with the patient and his wife in detail, they affirmed that he was not having any new or worsening shortness of breath since his discharge.  His wife did report that once the patient woke up/sat up his desaturation spontaneously resolved.  Patient denies any steven current shortness of breath or chest pain.  No nausea vomiting or flu fever like feeling.  In my discussion with the ED provider who was noted that the patient was complaining of shortness of breath but there was a concern that this likely was anxiety triggered.  Patient seems to be saturating well on room air here in the ED and chest x-ray which I personally reviewed is clear bilaterally.  Wife reports patient has been compliant with his medications.    The blood work did demonstrate an acutely elevated creatinine of 3.8 whereas his baseline is in the 2s.      Overview/Hospital Course:  67-year-old renal transplant black male was admitted for acute on chronic kidney injury.  Patient was just discharged from hospitalization for COVID pneumonia on 02/10/2023.  Presented to the ED per recommendations of the COVID surveillance team.  Did not  have any definitive acute respiratory findings to indicate acute ongoing infectious COVID, chest x-ray was clear.  However creatinine was significantly elevated up to 3.8 whereas baseline was in the 2s.  Was started on IV fluids with trivial improvement.      Interval History: Patient lying in bed, no acute distress. Wife at bedside. Patient reports continued improvement in SOB and cough. Tolerating oral lasix well with adequate UOP response. Nephrology following and contiuing to monitor Scr. On 2.5 L NC and continuing supportive care. DSA pending.       Intake/Output Summary (Last 24 hours) at 2/25/2023 1133  Last data filed at 2/25/2023 0512  Gross per 24 hour   Intake --   Output 875 ml   Net -875 ml           Review of Systems   Constitutional:  Positive for activity change and fatigue. Negative for chills and fever.   HENT:  Positive for voice change. Negative for congestion and trouble swallowing.    Eyes:  Negative for visual disturbance.   Respiratory:  Positive for cough and shortness of breath.    Cardiovascular:  Negative for chest pain and leg swelling.   Gastrointestinal:  Negative for abdominal distention, abdominal pain, nausea and vomiting.   Endocrine: Negative for cold intolerance, heat intolerance, polydipsia and polyuria.   Genitourinary:  Negative for difficulty urinating and dysuria.        Urinary incontinence   Musculoskeletal:  Negative for back pain and myalgias.   Skin:  Negative for rash and wound.   Neurological:  Positive for weakness. Negative for dizziness and light-headedness.   Hematological:  Negative for adenopathy. Does not bruise/bleed easily.   Psychiatric/Behavioral:  Negative for confusion and sleep disturbance.      Objective:     Vital Signs (Most Recent):  Temp: 98 °F (36.7 °C) (02/25/23 1130)  Pulse: 72 (02/25/23 1130)  Resp: 18 (02/25/23 1130)  BP: (!) 154/78 (02/25/23 1130)  SpO2: (!) 93 % (02/25/23 1130)   Vital Signs (24h Range):  Temp:  [97.7 °F (36.5 °C)-98.7 °F  (37.1 °C)] 98 °F (36.7 °C)  Pulse:  [67-86] 72  Resp:  [16-25] 18  SpO2:  [91 %-96 %] 93 %  BP: (132-182)/(63-78) 154/78     Weight: 101.6 kg (224 lb)  Body mass index is 29.55 kg/m².    Intake/Output Summary (Last 24 hours) at 2/25/2023 1133  Last data filed at 2/25/2023 0512  Gross per 24 hour   Intake --   Output 875 ml   Net -875 ml        Physical Exam  Constitutional:       Appearance: He is well-developed.   HENT:      Head: Normocephalic and atraumatic.      Mouth/Throat:      Mouth: Mucous membranes are moist.      Comments: hoarseness  Eyes:      General: No scleral icterus.     Extraocular Movements: Extraocular movements intact.      Pupils: Pupils are equal, round, and reactive to light.   Neck:      Vascular: No JVD.   Cardiovascular:      Rate and Rhythm: Normal rate and regular rhythm.      Heart sounds: No murmur heard.    No friction rub. No gallop.   Pulmonary:      Effort: Pulmonary effort is normal. No respiratory distress.      Breath sounds: Examination of the right-lower field reveals decreased breath sounds. Examination of the left-lower field reveals decreased breath sounds. Decreased breath sounds, wheezing and rhonchi present. No rales.   Abdominal:      General: Bowel sounds are normal. There is no distension.      Palpations: Abdomen is soft.      Tenderness: There is no abdominal tenderness.   Musculoskeletal:         General: No deformity. Normal range of motion.      Cervical back: Neck supple.   Lymphadenopathy:      Cervical: No cervical adenopathy.   Skin:     General: Skin is warm and dry.      Capillary Refill: Capillary refill takes less than 2 seconds.      Findings: No erythema or rash.   Neurological:      General: No focal deficit present.      Mental Status: He is alert and oriented to person, place, and time.      Cranial Nerves: No cranial nerve deficit.      Sensory: No sensory deficit.   Psychiatric:         Mood and Affect: Mood normal.       Significant Labs: A1C:    Recent Labs   Lab 09/08/22  0909 01/04/23  0819   HGBA1C 5.4 5.5       CBC:   Recent Labs   Lab 02/24/23  0819   WBC 7.93   HGB 7.6*   HCT 24.6*          CMP:   Recent Labs   Lab 02/24/23  0819      K 4.1      CO2 19*   GLU 95   BUN 41*   CREATININE 3.8*   CALCIUM 7.2*   ALBUMIN 2.2*   ANIONGAP 12       Coagulation: No results for input(s): PT, INR, APTT in the last 48 hours.  Lactic Acid:   No results for input(s): LACTATE in the last 48 hours.        Significant Imaging: I have reviewed all pertinent imaging results/findings within the past 24 hours.      Assessment/Plan:      * Acute-on-chronic kidney injury  Baseline seems to be a creatinine in the 2s   Cr still elevated unchanged at 3.8 despite IVF. Scr increased to 4.0 --> 3.8  US no overly revealing; Transplant working up rejection  - nephrology consulted. apprec recs  -- followup DSA  -- off IVF. Ordered IV lasix 40 mg BID x 2 doses on 2/23. Transitioned to oral lasix 40 mg BID  -- BMP daily   - IMPROVING          Anemia of renal disease  Acute anemia  1 unit prbc ordered on 2/22  - H/H stable      Wheezing  - albuterol inhaler prn      Chronic dyspnea  -Unchanged prior to admission; Don't suspect acute covid  -CT chest w/o contrast showing BL pleural effusions; d/w IR no need to hold anticoagulants; thoracentesis order; shows infiltrates but I believe these are older post covid inflammatory findings  -tx underlying anemia - see below  - weaned from 4 L NC to 2 L NC and now up to 3 L NC  - continue supportive care: albuterol prn, incentive spirometry, anti-tussive meds, chest PT, acapella, OOB to chair  -covid + on 2/7/23  - IMPROVING    Cellulitis of foot  Finish regimen of cephalex in a few days      Hypocalcemia  - replete prn      Anemia due to stage 4 chronic kidney disease  Stable, monitor      Chronic diastolic heart failure  - Initially held home dosing of p.r.n. Lasix given KATINA and did trial of IVF but no improvement in Scr  -  nephrology following   - started trial of IV lasix on 2/23 with improvement in Scr. Transitioned to oral lasix 40 mg BID  - continue home metoprolol  - followup BMP    Paroxysmal atrial fibrillation  Continue home Eliquis, flecainide, metoprolol        H/O kidney transplant  K transplant service following  Routine prograf levels  Continue home Prograf 2 mg b.i.d.  Continue home prednisone      CAD (coronary artery disease)  History of stenting years ago   Continue home aspirin and statin, Toprol      Hypertension  Continue home metoprolol, nifedipine, hydralazine        VTE Risk Mitigation (From admission, onward)         Ordered     apixaban tablet 5 mg  2 times daily         02/22/23 1607                Discharge Planning   RAMU: 2/27/2023     Code Status: Prior   Is the patient medically ready for discharge?: No    Reason for patient still in hospital (select all that apply): Patient unstable, Patient trending condition, Laboratory test, Treatment and Consult recommendations  Discharge Plan A: Home with family          Time spent in care of patient: > 35 minutes           Morris Payan MD  Department of Hospital Medicine   [facility] - Intensive Care ([place])

## 2023-02-25 NOTE — SUBJECTIVE & OBJECTIVE
Interval History: Patient lying in bed, no acute distress. Wife at bedside. Patient reports continued improvement in SOB and cough. Tolerating oral lasix well with adequate UOP response. Nephrology following and contiuing to monitor Scr. On 2.5 L NC and continuing supportive care. DSA pending.       Intake/Output Summary (Last 24 hours) at 2/25/2023 1133  Last data filed at 2/25/2023 0512  Gross per 24 hour   Intake --   Output 875 ml   Net -875 ml           Review of Systems   Constitutional:  Positive for activity change and fatigue. Negative for chills and fever.   HENT:  Positive for voice change. Negative for congestion and trouble swallowing.    Eyes:  Negative for visual disturbance.   Respiratory:  Positive for cough and shortness of breath.    Cardiovascular:  Negative for chest pain and leg swelling.   Gastrointestinal:  Negative for abdominal distention, abdominal pain, nausea and vomiting.   Endocrine: Negative for cold intolerance, heat intolerance, polydipsia and polyuria.   Genitourinary:  Negative for difficulty urinating and dysuria.        Urinary incontinence   Musculoskeletal:  Negative for back pain and myalgias.   Skin:  Negative for rash and wound.   Neurological:  Positive for weakness. Negative for dizziness and light-headedness.   Hematological:  Negative for adenopathy. Does not bruise/bleed easily.   Psychiatric/Behavioral:  Negative for confusion and sleep disturbance.      Objective:     Vital Signs (Most Recent):  Temp: 98 °F (36.7 °C) (02/25/23 1130)  Pulse: 72 (02/25/23 1130)  Resp: 18 (02/25/23 1130)  BP: (!) 154/78 (02/25/23 1130)  SpO2: (!) 93 % (02/25/23 1130)   Vital Signs (24h Range):  Temp:  [97.7 °F (36.5 °C)-98.7 °F (37.1 °C)] 98 °F (36.7 °C)  Pulse:  [67-86] 72  Resp:  [16-25] 18  SpO2:  [91 %-96 %] 93 %  BP: (132-182)/(63-78) 154/78     Weight: 101.6 kg (224 lb)  Body mass index is 29.55 kg/m².    Intake/Output Summary (Last 24 hours) at 2/25/2023 1133  Last data filed at  2/25/2023 0512  Gross per 24 hour   Intake --   Output 875 ml   Net -875 ml        Physical Exam  Constitutional:       Appearance: He is well-developed.   HENT:      Head: Normocephalic and atraumatic.      Mouth/Throat:      Mouth: Mucous membranes are moist.      Comments: hoarseness  Eyes:      General: No scleral icterus.     Extraocular Movements: Extraocular movements intact.      Pupils: Pupils are equal, round, and reactive to light.   Neck:      Vascular: No JVD.   Cardiovascular:      Rate and Rhythm: Normal rate and regular rhythm.      Heart sounds: No murmur heard.    No friction rub. No gallop.   Pulmonary:      Effort: Pulmonary effort is normal. No respiratory distress.      Breath sounds: Examination of the right-lower field reveals decreased breath sounds. Examination of the left-lower field reveals decreased breath sounds. Decreased breath sounds, wheezing and rhonchi present. No rales.   Abdominal:      General: Bowel sounds are normal. There is no distension.      Palpations: Abdomen is soft.      Tenderness: There is no abdominal tenderness.   Musculoskeletal:         General: No deformity. Normal range of motion.      Cervical back: Neck supple.   Lymphadenopathy:      Cervical: No cervical adenopathy.   Skin:     General: Skin is warm and dry.      Capillary Refill: Capillary refill takes less than 2 seconds.      Findings: No erythema or rash.   Neurological:      General: No focal deficit present.      Mental Status: He is alert and oriented to person, place, and time.      Cranial Nerves: No cranial nerve deficit.      Sensory: No sensory deficit.   Psychiatric:         Mood and Affect: Mood normal.       Significant Labs: A1C:   Recent Labs   Lab 09/08/22  0909 01/04/23  0819   HGBA1C 5.4 5.5       CBC:   Recent Labs   Lab 02/24/23  0819   WBC 7.93   HGB 7.6*   HCT 24.6*          CMP:   Recent Labs   Lab 02/24/23  0819      K 4.1      CO2 19*   GLU 95   BUN 41*    CREATININE 3.8*   CALCIUM 7.2*   ALBUMIN 2.2*   ANIONGAP 12       Coagulation: No results for input(s): PT, INR, APTT in the last 48 hours.  Lactic Acid:   No results for input(s): LACTATE in the last 48 hours.        Significant Imaging: I have reviewed all pertinent imaging results/findings within the past 24 hours.

## 2023-02-26 PROBLEM — I50.33 ACUTE ON CHRONIC DIASTOLIC HEART FAILURE: Status: ACTIVE | Noted: 2020-04-08

## 2023-02-26 PROBLEM — R09.02 HYPOXIA: Status: ACTIVE | Noted: 2023-02-26

## 2023-02-26 PROBLEM — M25.571 RIGHT ANKLE PAIN: Status: ACTIVE | Noted: 2023-02-26

## 2023-02-26 LAB
ANION GAP SERPL CALC-SCNC: 13 MMOL/L (ref 8–16)
BUN SERPL-MCNC: 48 MG/DL (ref 8–23)
CALCIUM SERPL-MCNC: 7.3 MG/DL (ref 8.7–10.5)
CHLORIDE SERPL-SCNC: 104 MMOL/L (ref 95–110)
CO2 SERPL-SCNC: 18 MMOL/L (ref 23–29)
CREAT SERPL-MCNC: 3.7 MG/DL (ref 0.5–1.4)
CRP SERPL-MCNC: 113.8 MG/L (ref 0–8.2)
ERYTHROCYTE [DISTWIDTH] IN BLOOD BY AUTOMATED COUNT: 16 % (ref 11.5–14.5)
ERYTHROCYTE [SEDIMENTATION RATE] IN BLOOD BY PHOTOMETRIC METHOD: 118 MM/HR (ref 0–23)
EST. GFR  (NO RACE VARIABLE): 17.1 ML/MIN/1.73 M^2
GLUCOSE SERPL-MCNC: 80 MG/DL (ref 70–110)
HCT VFR BLD AUTO: 25.3 % (ref 40–54)
HGB BLD-MCNC: 7.3 G/DL (ref 14–18)
MAGNESIUM SERPL-MCNC: 2.3 MG/DL (ref 1.6–2.6)
MCH RBC QN AUTO: 23.9 PG (ref 27–31)
MCHC RBC AUTO-ENTMCNC: 28.9 G/DL (ref 32–36)
MCV RBC AUTO: 83 FL (ref 82–98)
PHOSPHATE SERPL-MCNC: 6.2 MG/DL (ref 2.7–4.5)
PLATELET # BLD AUTO: 232 K/UL (ref 150–450)
PMV BLD AUTO: 11.6 FL (ref 9.2–12.9)
POTASSIUM SERPL-SCNC: 4.1 MMOL/L (ref 3.5–5.1)
PROCALCITONIN SERPL IA-MCNC: 0.49 NG/ML
RBC # BLD AUTO: 3.05 M/UL (ref 4.6–6.2)
SODIUM SERPL-SCNC: 135 MMOL/L (ref 136–145)
TACROLIMUS BLD-MCNC: 4.5 NG/ML (ref 5–15)
URATE SERPL-MCNC: 14.5 MG/DL (ref 3.4–7)
WBC # BLD AUTO: 8.01 K/UL (ref 3.9–12.7)

## 2023-02-26 PROCEDURE — 12000002 HC ACUTE/MED SURGE SEMI-PRIVATE ROOM

## 2023-02-26 PROCEDURE — 25000003 PHARM REV CODE 250: Performed by: INTERNAL MEDICINE

## 2023-02-26 PROCEDURE — 99223 PR INITIAL HOSPITAL CARE,LEVL III: ICD-10-PCS | Mod: GC,,, | Performed by: STUDENT IN AN ORGANIZED HEALTH CARE EDUCATION/TRAINING PROGRAM

## 2023-02-26 PROCEDURE — 86140 C-REACTIVE PROTEIN: CPT | Performed by: INTERNAL MEDICINE

## 2023-02-26 PROCEDURE — 93005 ELECTROCARDIOGRAM TRACING: CPT

## 2023-02-26 PROCEDURE — 80197 ASSAY OF TACROLIMUS: CPT | Performed by: HOSPITALIST

## 2023-02-26 PROCEDURE — 99232 SBSQ HOSP IP/OBS MODERATE 35: CPT | Mod: ,,, | Performed by: INTERNAL MEDICINE

## 2023-02-26 PROCEDURE — 27000646 HC AEROBIKA DEVICE

## 2023-02-26 PROCEDURE — 85027 COMPLETE CBC AUTOMATED: CPT | Performed by: INTERNAL MEDICINE

## 2023-02-26 PROCEDURE — 99233 PR SUBSEQUENT HOSPITAL CARE,LEVL III: ICD-10-PCS | Mod: ,,, | Performed by: INTERNAL MEDICINE

## 2023-02-26 PROCEDURE — 94664 DEMO&/EVAL PT USE INHALER: CPT

## 2023-02-26 PROCEDURE — 84100 ASSAY OF PHOSPHORUS: CPT | Performed by: INTERNAL MEDICINE

## 2023-02-26 PROCEDURE — 63600175 PHARM REV CODE 636 W HCPCS: Performed by: HOSPITALIST

## 2023-02-26 PROCEDURE — 36415 COLL VENOUS BLD VENIPUNCTURE: CPT | Performed by: HOSPITALIST

## 2023-02-26 PROCEDURE — 94640 AIRWAY INHALATION TREATMENT: CPT

## 2023-02-26 PROCEDURE — 99232 PR SUBSEQUENT HOSPITAL CARE,LEVL II: ICD-10-PCS | Mod: ,,, | Performed by: INTERNAL MEDICINE

## 2023-02-26 PROCEDURE — 83735 ASSAY OF MAGNESIUM: CPT | Performed by: INTERNAL MEDICINE

## 2023-02-26 PROCEDURE — 27000221 HC OXYGEN, UP TO 24 HOURS

## 2023-02-26 PROCEDURE — 84145 PROCALCITONIN (PCT): CPT | Performed by: INTERNAL MEDICINE

## 2023-02-26 PROCEDURE — 99233 SBSQ HOSP IP/OBS HIGH 50: CPT | Mod: ,,, | Performed by: INTERNAL MEDICINE

## 2023-02-26 PROCEDURE — 63600175 PHARM REV CODE 636 W HCPCS: Performed by: INTERNAL MEDICINE

## 2023-02-26 PROCEDURE — 94799 UNLISTED PULMONARY SVC/PX: CPT

## 2023-02-26 PROCEDURE — 99223 1ST HOSP IP/OBS HIGH 75: CPT | Mod: GC,,, | Performed by: STUDENT IN AN ORGANIZED HEALTH CARE EDUCATION/TRAINING PROGRAM

## 2023-02-26 PROCEDURE — 94761 N-INVAS EAR/PLS OXIMETRY MLT: CPT

## 2023-02-26 PROCEDURE — 94668 MNPJ CHEST WALL SBSQ: CPT

## 2023-02-26 PROCEDURE — 93010 ELECTROCARDIOGRAM REPORT: CPT | Mod: ,,, | Performed by: INTERNAL MEDICINE

## 2023-02-26 PROCEDURE — 80048 BASIC METABOLIC PNL TOTAL CA: CPT | Performed by: INTERNAL MEDICINE

## 2023-02-26 PROCEDURE — 84550 ASSAY OF BLOOD/URIC ACID: CPT | Performed by: INTERNAL MEDICINE

## 2023-02-26 PROCEDURE — 25000003 PHARM REV CODE 250: Performed by: STUDENT IN AN ORGANIZED HEALTH CARE EDUCATION/TRAINING PROGRAM

## 2023-02-26 PROCEDURE — 85652 RBC SED RATE AUTOMATED: CPT | Performed by: INTERNAL MEDICINE

## 2023-02-26 PROCEDURE — 36415 COLL VENOUS BLD VENIPUNCTURE: CPT | Performed by: INTERNAL MEDICINE

## 2023-02-26 PROCEDURE — 25000242 PHARM REV CODE 250 ALT 637 W/ HCPCS: Performed by: INTERNAL MEDICINE

## 2023-02-26 PROCEDURE — 99900035 HC TECH TIME PER 15 MIN (STAT)

## 2023-02-26 PROCEDURE — 25000003 PHARM REV CODE 250: Performed by: HOSPITALIST

## 2023-02-26 PROCEDURE — 93010 EKG 12-LEAD: ICD-10-PCS | Mod: ,,, | Performed by: INTERNAL MEDICINE

## 2023-02-26 RX ORDER — IPRATROPIUM BROMIDE AND ALBUTEROL SULFATE 2.5; .5 MG/3ML; MG/3ML
3 SOLUTION RESPIRATORY (INHALATION)
Status: DISCONTINUED | OUTPATIENT
Start: 2023-02-26 | End: 2023-03-06 | Stop reason: HOSPADM

## 2023-02-26 RX ORDER — DIVALPROEX SODIUM 250 MG/1
500 TABLET, DELAYED RELEASE ORAL NIGHTLY
Status: DISCONTINUED | OUTPATIENT
Start: 2023-02-26 | End: 2023-02-28

## 2023-02-26 RX ORDER — DIVALPROEX SODIUM 250 MG/1
250 TABLET, DELAYED RELEASE ORAL EVERY MORNING
Status: DISCONTINUED | OUTPATIENT
Start: 2023-02-27 | End: 2023-02-28

## 2023-02-26 RX ORDER — FUROSEMIDE 10 MG/ML
40 INJECTION INTRAMUSCULAR; INTRAVENOUS 2 TIMES DAILY
Status: DISCONTINUED | OUTPATIENT
Start: 2023-02-26 | End: 2023-02-27

## 2023-02-26 RX ORDER — IPRATROPIUM BROMIDE AND ALBUTEROL SULFATE 2.5; .5 MG/3ML; MG/3ML
3 SOLUTION RESPIRATORY (INHALATION) EVERY 4 HOURS PRN
Status: DISCONTINUED | OUTPATIENT
Start: 2023-02-26 | End: 2023-03-06 | Stop reason: HOSPADM

## 2023-02-26 RX ORDER — OXYBUTYNIN CHLORIDE 5 MG/1
5 TABLET ORAL ONCE
Status: COMPLETED | OUTPATIENT
Start: 2023-02-26 | End: 2023-02-26

## 2023-02-26 RX ADMIN — CEPHALEXIN 500 MG: 500 CAPSULE ORAL at 06:02

## 2023-02-26 RX ADMIN — FLUTICASONE FUROATE AND VILANTEROL TRIFENATATE 1 PUFF: 100; 25 POWDER RESPIRATORY (INHALATION) at 08:02

## 2023-02-26 RX ADMIN — CALCIUM ACETATE 1334 MG: 667 CAPSULE ORAL at 05:02

## 2023-02-26 RX ADMIN — COLCHICINE 0.3 MG: 0.6 TABLET, FILM COATED ORAL at 06:02

## 2023-02-26 RX ADMIN — METOPROLOL SUCCINATE 25 MG: 25 TABLET, EXTENDED RELEASE ORAL at 08:02

## 2023-02-26 RX ADMIN — Medication 6 MG: at 09:02

## 2023-02-26 RX ADMIN — GABAPENTIN 300 MG: 300 CAPSULE ORAL at 08:02

## 2023-02-26 RX ADMIN — APIXABAN 5 MG: 5 TABLET, FILM COATED ORAL at 08:02

## 2023-02-26 RX ADMIN — ALBUTEROL SULFATE 2 PUFF: 108 INHALANT RESPIRATORY (INHALATION) at 08:02

## 2023-02-26 RX ADMIN — CALCIUM ACETATE 1334 MG: 667 CAPSULE ORAL at 08:02

## 2023-02-26 RX ADMIN — SODIUM BICARBONATE 1300 MG: 650 TABLET ORAL at 02:02

## 2023-02-26 RX ADMIN — ALBUTEROL SULFATE 2 PUFF: 108 INHALANT RESPIRATORY (INHALATION) at 11:02

## 2023-02-26 RX ADMIN — PARICALCITOL 1 MCG: 1 CAPSULE, LIQUID FILLED ORAL at 08:02

## 2023-02-26 RX ADMIN — NIFEDIPINE 60 MG: 30 TABLET, FILM COATED, EXTENDED RELEASE ORAL at 08:02

## 2023-02-26 RX ADMIN — TACROLIMUS 2.5 MG: 1 CAPSULE ORAL at 05:02

## 2023-02-26 RX ADMIN — CEPHALEXIN 500 MG: 500 CAPSULE ORAL at 02:02

## 2023-02-26 RX ADMIN — CALCIUM ACETATE 1334 MG: 667 CAPSULE ORAL at 12:02

## 2023-02-26 RX ADMIN — SODIUM BICARBONATE 1300 MG: 650 TABLET ORAL at 06:02

## 2023-02-26 RX ADMIN — PREDNISONE 5 MG: 5 TABLET ORAL at 08:02

## 2023-02-26 RX ADMIN — TACROLIMUS 2.5 MG: 1 CAPSULE ORAL at 08:02

## 2023-02-26 RX ADMIN — ASPIRIN 81 MG: 81 TABLET, COATED ORAL at 08:02

## 2023-02-26 RX ADMIN — OXYBUTYNIN CHLORIDE 5 MG: 5 TABLET ORAL at 06:02

## 2023-02-26 RX ADMIN — FAMOTIDINE 20 MG: 20 TABLET ORAL at 08:02

## 2023-02-26 RX ADMIN — FUROSEMIDE 40 MG: 10 INJECTION, SOLUTION INTRAMUSCULAR; INTRAVENOUS at 05:02

## 2023-02-26 RX ADMIN — GABAPENTIN 300 MG: 300 CAPSULE ORAL at 09:02

## 2023-02-26 RX ADMIN — FUROSEMIDE 40 MG: 20 TABLET ORAL at 08:02

## 2023-02-26 RX ADMIN — IPRATROPIUM BROMIDE AND ALBUTEROL SULFATE 3 ML: 2.5; .5 SOLUTION RESPIRATORY (INHALATION) at 08:02

## 2023-02-26 RX ADMIN — FLECAINIDE ACETATE 50 MG: 50 TABLET ORAL at 08:02

## 2023-02-26 RX ADMIN — NIFEDIPINE 60 MG: 30 TABLET, FILM COATED, EXTENDED RELEASE ORAL at 09:02

## 2023-02-26 RX ADMIN — SODIUM BICARBONATE 1300 MG: 650 TABLET ORAL at 09:02

## 2023-02-26 RX ADMIN — DOXAZOSIN 4 MG: 2 TABLET ORAL at 09:02

## 2023-02-26 RX ADMIN — MUPIROCIN: 20 OINTMENT TOPICAL at 09:02

## 2023-02-26 RX ADMIN — HYDRALAZINE HYDROCHLORIDE 50 MG: 25 TABLET, FILM COATED ORAL at 09:02

## 2023-02-26 RX ADMIN — DIVALPROEX SODIUM 500 MG: 250 TABLET, DELAYED RELEASE ORAL at 09:02

## 2023-02-26 RX ADMIN — HYDRALAZINE HYDROCHLORIDE 50 MG: 25 TABLET, FILM COATED ORAL at 08:02

## 2023-02-26 RX ADMIN — CEPHALEXIN 500 MG: 500 CAPSULE ORAL at 09:02

## 2023-02-26 RX ADMIN — DOXAZOSIN 4 MG: 2 TABLET ORAL at 08:02

## 2023-02-26 RX ADMIN — ATORVASTATIN CALCIUM 40 MG: 20 TABLET, FILM COATED ORAL at 09:02

## 2023-02-26 RX ADMIN — CLONAZEPAM 0.5 MG: 0.5 TABLET ORAL at 09:02

## 2023-02-26 RX ADMIN — HYDRALAZINE HYDROCHLORIDE 50 MG: 25 TABLET, FILM COATED ORAL at 02:02

## 2023-02-26 RX ADMIN — MUPIROCIN: 20 OINTMENT TOPICAL at 08:02

## 2023-02-26 RX ADMIN — APIXABAN 5 MG: 5 TABLET, FILM COATED ORAL at 09:02

## 2023-02-26 RX ADMIN — ERYTHROPOIETIN 20000 UNITS: 10000 INJECTION, SOLUTION INTRAVENOUS; SUBCUTANEOUS at 03:02

## 2023-02-26 NOTE — PROGRESS NOTES
[facility] ([place] [address])  Nephrology  Progress Note    Patient Name: Ibrahima hPelps  MRN: [id number]  Admission Date: 2/20/2023  Hospital Length of Stay: 6 days  Attending Provider: Morris Payan MD   Primary Care Physician: Connie Magdaleno MD  Principal Problem:Acute-on-chronic kidney injury    Consults  Subjective:     Interval History: patient seen and noted to be sleeping peacefully. Care discussed with wife as well as patient's nurse. Noted to have significant deconditioning when walking even to the restroom.    Review of patient's allergies indicates:   Allergen Reactions    Ace inhibitors Swelling    Verapamil Other (See Comments)     Other reaction(s): Unknown    Povidone-iodine Itching     Current Facility-Administered Medications   Medication Frequency    0.9%  NaCl infusion (for blood administration) Q24H PRN    albuterol inhaler 2 puff Q6H PRN    apixaban tablet 5 mg BID    aspirin EC tablet 81 mg Daily    atorvastatin tablet 40 mg QHS    calcium acetate(phosphat bind) capsule 1,334 mg TID WM    cephALEXin capsule 500 mg Q8H    clonazePAM tablet 0.5 mg BID PRN    doxazosin tablet 4 mg Q12H    famotidine tablet 20 mg Daily    fluticasone furoate-vilanteroL 100-25 mcg/dose diskus inhaler 1 puff Daily    furosemide tablet 40 mg BID    gabapentin capsule 300 mg BID    hydrALAZINE tablet 50 mg TID    melatonin tablet 6 mg Nightly PRN    metoprolol succinate (TOPROL-XL) 24 hr split tablet 25 mg Daily    mupirocin 2 % ointment BID    NIFEdipine 24 hr tablet 60 mg BID    paricalcitoL capsule 1 mcg Daily    predniSONE tablet 5 mg Daily    sodium bicarbonate tablet 1,300 mg Q8H    tacrolimus capsule 2.5 mg BID       Objective:     Vital Signs (Most Recent):  Temp: 98 °F (36.7 °C) (02/26/23 0736)  Pulse: 74 (02/26/23 1110)  Resp: (!) 28 (02/26/23 1110)  BP: (!) 168/72 (02/26/23 0736)  SpO2: 97 % (02/26/23 1110)   Vital Signs (24h Range):  Temp:  [72 °F (22.2 °C)-98.6 °F (37 °C)] 98 °F (36.7  °C)  Pulse:  [60-77] 74  Resp:  [16-28] 28  SpO2:  [90 %-97 %] 97 %  BP: (129-168)/(63-88) 168/72     Weight: 101.6 kg (224 lb) (02/20/23 1158)  Body mass index is 29.55 kg/m².  Body surface area is 2.29 meters squared.    I/O last 3 completed shifts:  In: 720 [P.O.:720]  Out: 875 [Urine:875]    Physical Exam  Vitals reviewed.   Constitutional:       General: He is not in acute distress.     Appearance: Normal appearance. He is not ill-appearing or toxic-appearing.      Comments: Appears stated age   HENT:      Head: Normocephalic and atraumatic.      Right Ear: External ear normal.      Left Ear: External ear normal.      Nose: Nose normal.   Eyes:      General: No scleral icterus.     Conjunctiva/sclera: Conjunctivae normal.   Cardiovascular:      Rate and Rhythm: Normal rate and regular rhythm.      Pulses: Normal pulses.      Heart sounds: Normal heart sounds. No murmur heard.    No friction rub.   Pulmonary:      Effort: Pulmonary effort is normal. No respiratory distress.      Breath sounds: Normal breath sounds. No wheezing or rhonchi.      Comments: Coarse breath sounds noted   Abdominal:      General: Bowel sounds are normal. There is no distension.      Palpations: Abdomen is soft.      Tenderness: There is no abdominal tenderness. There is no guarding.   Musculoskeletal:         General: No swelling or deformity. Normal range of motion.      Cervical back: Normal range of motion and neck supple. No tenderness.      Right lower leg: No edema.      Left lower leg: No edema.   Skin:     General: Skin is warm and dry.      Coloration: Skin is not jaundiced.      Findings: No erythema or rash.   Neurological:      General: No focal deficit present.      Mental Status: He is alert and oriented to person, place, and time.      Motor: No weakness.   Psychiatric:         Mood and Affect: Mood normal.         Behavior: Behavior normal.         Assessment/Plan:   67 yr old AAM with ESRD secondary to unknown cause  s/p a second  donor kidney transplant in  who was admitted for hypoxia as well as elevated creatinine of 3.8 from baseline of 2.0    1) KATINA of transplanted kidney:   - UA and US kidney transplant unremarkable.   - mild improvement noted with diuresis. Switch lasix 40 mg BID to lasix 40 mg IV daily. Strict I and O's   - DSA pending but discussed with patient's wife and primary at length regarding the increased risk and significant harm with performing a kidney biopsy in a patient with recent COVID infection, on Eliquis in setting of transplanted kidney of 17 yrs with significant chronic changes   - FK level increasing. Cont on FK 2.5 mg BID   - can restart on  BID at the time of discharge   - cont on prednisone 5 mg daily  2) Respiratory failure:   - post COVID. CT chest reviewed and noted to have B pleural effusions but unable to obtain thoracentesis.   - obtain CXR. Can consider pulm consult if patient desaturates  3) metabolic acidosis:   - cont on sodium bicarb 1300 q 8  4) Anemia:   - s/p 1 unit of PRBC  - iron replete and start on Epogen  5) Hyperphosphatemia:   - increase phoslo    - renal diet   6) AFIB:   - seen by TA and maria a on hold   - on Eliquis      Tasia Conroy DO  Nephrology  [facility] ([facility]

## 2023-02-26 NOTE — ASSESSMENT & PLAN NOTE
- Initially held home dosing of p.r.n. Lasix given KATINA and did trial of IVF but no improvement in Scr  - nephrology following   - started trial of IV lasix on 2/23 with improvement in Scr. Transitioned to oral lasix 40 mg BID and now on IV lasix 40 mg BID  - continue home metoprolol  - followup BMP

## 2023-02-26 NOTE — CARE UPDATE
Requesting from team, aerosol therapy in place of inhalation due to wheezing unchanged with inhalers     02/26/23 1110   Patient Assessment/Suction   Level of Consciousness (AVPU) alert   Respiratory Effort Labored;Short of breath  (cant speak full sentences)   Expansion/Accessory Muscles/Retractions abdominal muscle use   All Lung Fields Breath Sounds Anterior:;Lateral:;wheezes, expiratory;equal bilaterally   Cough Frequency no cough   Cough Type dry   Skin Integrity   $ Wound Care Tech Time 15 min   Area Observed Behind ear;Cheek;Upper lip;Nares   PRE-TX-O2   Device (Oxygen Therapy) nasal cannula with humidification   $ Is the patient on Low Flow Oxygen? Yes   Flow (L/min) (S)  2  (reduced to 2.0 lpm from 2.5)   Oxygen Concentration (%) 28   SpO2 97 %   Pulse Oximetry Type Continuous   $ Pulse Oximetry - Multiple Charge Pulse Oximetry - Multiple   Pulse 74   Resp (!) 28   Positioning   Body Position position changed independently  (sitting up in bed\)   Head of Bed (HOB) Positioning HOB at 60-90 degrees   Inhaler   $ Inhaler Charges MDI (Metered Dose Inahler) Treatment;Given With Spacer   Daily Review of Necessity (Inhaler) completed   Respiratory Treatment Status (Inhaler) given   Treatment Route (Inhaler) mouthpiece;spacer/holding chamber   Patient Position (Inhaler)   (up in bed)   Post Treatment Assessment (Inhaler) breath sounds unchanged   Signs of Intolerance (Inhaler) none   Breath Sounds Post-Respiratory Treatment   Throughout All Fields Post-Treatment All Fields   Throughout All Fields Post-Treatment no change   Post-treatment Heart Rate (beats/min) 74   Post-treatment Resp Rate (breaths/min) 24   Incentive Spirometer   $ Incentive Spirometer Charges done with encouragement   Administration (IS) instruction provided, initial   Number of Repetitions (IS) 8   Level Incentive Spirometer (mL) 1500   Patient Tolerance (IS) good;no adverse signs/symptoms present   Vibratory PEP Therapy   $ Vibratory PEP  Charges Aerobika Therapy   $ Vibratory PEP Equipment Aerobika Equipment   $ Vibratory PEP Tech Time Charges 15 min   Daily Review of Necessity (PEP Therapy) completed   Type (PEP Therapy) vibratory/oscillatory   Device (PEP Therapy) flutter  (aerobika)   Route (PEP Therapy) mouthpiece   Breaths per Cycle (PEP Therapy) 15   Settings (PEP Therapy) PEP 5   Post Treatment Assessment (PEP) breath sounds unchanged   Signs of Intolerance (PEP Therapy) none   Equipment Change   $ RT Equipment incentive spirometer

## 2023-02-26 NOTE — SUBJECTIVE & OBJECTIVE
Interval History: Patient lying in bed, no acute distress. Wife at bedside. Patient reports continued improvement in SOB and cough. He does note sleep disturbances and some confusion. Patient also reports new onset right ankle pain. Ordered gout workup and started tramadol prn. Started depakote to help with suspected hospital acquired delirium. Scr increased from 3.6 to 3.7. CXR showed pulmonary edema and pleural effusions. Weaned from 3 L NC to 2 L NC. Per nephrology, suspect that there is some element of transplant rejection but patient high risk for biopsy given recent COVID infection and on Eliquis. transitioned from oral to IV lasix. Continue pearson. Ordered respiratory infection panel. DSA pending. Per EP cardiology, holding home flecainide due to KATINA. PT/OT ordered.       Intake/Output Summary (Last 24 hours) at 2/26/2023 1650  Last data filed at 2/25/2023 1837  Gross per 24 hour   Intake 240 ml   Output --   Net 240 ml           Review of Systems   Constitutional:  Positive for activity change and fatigue. Negative for chills and fever.   HENT:  Positive for voice change. Negative for congestion and trouble swallowing.    Eyes:  Negative for visual disturbance.   Respiratory:  Positive for cough and shortness of breath.    Cardiovascular:  Negative for chest pain and leg swelling.   Gastrointestinal:  Negative for abdominal distention, abdominal pain, nausea and vomiting.   Endocrine: Negative for cold intolerance, heat intolerance, polydipsia and polyuria.   Genitourinary:  Negative for difficulty urinating and dysuria.        Urinary incontinence   Musculoskeletal:  Positive for arthralgias (right ankle). Negative for back pain and myalgias.   Skin:  Negative for rash and wound.   Neurological:  Positive for weakness. Negative for dizziness and light-headedness.   Hematological:  Negative for adenopathy. Does not bruise/bleed easily.   Psychiatric/Behavioral:  Negative for confusion and sleep disturbance.       Objective:     Vital Signs (Most Recent):  Temp: 98 °F (36.7 °C) (02/26/23 1535)  Pulse: 77 (02/26/23 1535)  Resp: 18 (02/26/23 1535)  BP: (!) 148/68 (02/26/23 1535)  SpO2: 95 % (02/26/23 1535)   Vital Signs (24h Range):  Temp:  [72 °F (22.2 °C)-98.6 °F (37 °C)] 98 °F (36.7 °C)  Pulse:  [60-77] 77  Resp:  [16-28] 18  SpO2:  [90 %-99 %] 95 %  BP: (129-176)/(63-88) 148/68     Weight: 101.6 kg (224 lb)  Body mass index is 29.55 kg/m².    Intake/Output Summary (Last 24 hours) at 2/26/2023 1650  Last data filed at 2/25/2023 1837  Gross per 24 hour   Intake 240 ml   Output --   Net 240 ml        Physical Exam  Constitutional:       Appearance: He is well-developed.   HENT:      Head: Normocephalic and atraumatic.      Mouth/Throat:      Mouth: Mucous membranes are moist.      Comments: hoarseness  Eyes:      General: No scleral icterus.     Extraocular Movements: Extraocular movements intact.      Pupils: Pupils are equal, round, and reactive to light.   Neck:      Vascular: No JVD.   Cardiovascular:      Rate and Rhythm: Normal rate and regular rhythm.      Heart sounds: No murmur heard.    No friction rub. No gallop.   Pulmonary:      Effort: Pulmonary effort is normal. No respiratory distress.      Breath sounds: Examination of the right-lower field reveals decreased breath sounds. Examination of the left-lower field reveals decreased breath sounds. Decreased breath sounds, wheezing and rhonchi present. No rales.   Abdominal:      General: Bowel sounds are normal. There is no distension.      Palpations: Abdomen is soft.      Tenderness: There is no abdominal tenderness.   Musculoskeletal:         General: Tenderness (right ankle) present. No deformity. Normal range of motion.      Cervical back: Neck supple.   Lymphadenopathy:      Cervical: No cervical adenopathy.   Skin:     General: Skin is warm and dry.      Capillary Refill: Capillary refill takes less than 2 seconds.      Findings: No erythema or rash.    Neurological:      General: No focal deficit present.      Mental Status: He is alert and oriented to person, place, and time.      Cranial Nerves: No cranial nerve deficit.      Sensory: No sensory deficit.      Motor: Weakness present.   Psychiatric:         Mood and Affect: Mood normal.       Significant Labs: A1C:   Recent Labs   Lab 09/08/22  0909 01/04/23  0819   HGBA1C 5.4 5.5       CBC:   Recent Labs   Lab 02/25/23  1402 02/26/23  0532   WBC 8.11 8.01   HGB 7.0* 7.3*   HCT 23.1* 25.3*    232       CMP:   Recent Labs   Lab 02/25/23  1402 02/26/23  0532   * 135*   K 4.8 4.1    104   CO2 21* 18*   * 80   BUN 46* 48*   CREATININE 3.6* 3.7*   CALCIUM 7.3* 7.3*   ANIONGAP 11 13       Coagulation: No results for input(s): PT, INR, APTT in the last 48 hours.  Lactic Acid:   No results for input(s): LACTATE in the last 48 hours.        Significant Imaging: I have reviewed all pertinent imaging results/findings within the past 24 hours.

## 2023-02-26 NOTE — ASSESSMENT & PLAN NOTE
Tele reviewed and pt noted to be in NSR with HR in 70s     -Given kidney function, can hold flecainide at this time   -His [id number] is 4 (HFpEF, HTN, aortic atherosclerosis, male gender, age 65-73), thus would continue Eliquis at this time

## 2023-02-26 NOTE — CARE UPDATE
Patient sob and becomes dizzy when turning.  Patient chelsea therapy   02/26/23 0736   Patient Assessment/Suction   Level of Consciousness (AVPU) alert   Respiratory Effort Labored;Short of breath   Expansion/Accessory Muscles/Retractions accessory muscle use   All Lung Fields Breath Sounds Anterior:;Lateral:   FRED Breath Sounds clear   LLL Breath Sounds diminished   RUL Breath Sounds clear   RML Breath Sounds diminished   RLL Breath Sounds diminished   Rhythm/Pattern, Respiratory shortness of breath   Cough Frequency frequent   Cough Type dry   Skin Integrity   $ Wound Care Tech Time 15 min   Area Observed Behind ear;Upper lip;Cheek;Nares   Skin Appearance without discoloration   PRE-TX-O2   Device (Oxygen Therapy) nasal cannula with humidification   $ Is the patient on Low Flow Oxygen? Yes   Flow (L/min) 2.5   Oxygen Concentration (%) 30   SpO2 (!) 90 %   Pulse Oximetry Type Continuous   $ Pulse Oximetry - Multiple Charge Pulse Oximetry - Multiple   Pulse 73   Resp 18   Temp 98 °F (36.7 °C)   BP (!) 168/72   Positioning   Body Position position changed independently   Head of Bed (HOB) Positioning HOB at 30 degrees   Inhaler   $ Inhaler Charges MDI (Metered Dose Inahler) Treatment;Given With Spacer   Daily Review of Necessity (Inhaler) completed   Respiratory Treatment Status (Inhaler) given   Treatment Route (Inhaler) mouthpiece;spacer/holding chamber   Patient Position (Inhaler) semi-Jackson's   Post Treatment Assessment (Inhaler) breath sounds unchanged   Signs of Intolerance (Inhaler) none   Breath Sounds Post-Respiratory Treatment   Throughout All Fields Post-Treatment All Fields   Throughout All Fields Post-Treatment no change   Post-treatment Heart Rate (beats/min) 74   Post-treatment Resp Rate (breaths/min) 20   Chest Physiotherapy   $ Chest Physiotherapy Charges Subsequent   Daily Review of Necessity (CPT) completed   Type (CPT) percussion   Method (CPT) by hand   Patient Position (CPT) HOB flat;semiprone on  left side;semiprone on right side   Lung Fields (CPT) Posterior:;FRED (left upper lobe);LLL (left lower lobe);RUL (right upper lobe);RML (right middle lobe);RLL (right lower lobe)   Duration per Field (CPT) 2 mins   Duration Left Side (CPT) 10 mins   Duration Right Side (CPT) 10 mins   CPT Total Time (Min) 20   Post Treatment Assessment (CPT) breath sounds unchanged   Signs of Intolerance (CPT) none

## 2023-02-26 NOTE — ASSESSMENT & PLAN NOTE
K transplant service following  Routine prograf levels  Continue home Prograf 2 mg b.i.d.  Continue home prednisone  - can restart on  BID at the time of discharge

## 2023-02-26 NOTE — ASSESSMENT & PLAN NOTE
"- continue IV diuresis   - weaning supplemental O2 as tolerated  - see "acute on chronic diastolic heart failure"    "

## 2023-02-26 NOTE — CONSULTS
[facility] - Intensive Care ([place] [address])  Cardiac Electrophysiology  Consult Note    Admission Date: 2/20/2023  Code Status: Prior   Attending Provider: Morris Payan MD  Consulting Provider: Ruy Garíca MD  Principal Problem:Acute-on-chronic kidney injury    Inpatient consult to Electrophysiology  Consult performed by: Ruy García MD  Consult ordered by: Morris Payan MD        Subjective:     Chief Complaint:  AF      HPI:   Ibrahima Phelps is a 66 y.o. male who presents for follow-up of Atrial Fibrillation     PROBLEM LIST:  PAF on Flecainide   CAD s/p PCI 2006  HFpEF  HTN  HLD  Carotid stenoses (20-39% bilateral)  H/o pericarditis  CKD3  S/p kidney transplant  Former smoker     HPI:Last seen in clinic by Dr. Parmar on 1/6/23. At that time pt was noted to be in NSR and continued on Flecainide at that time. Admitted for acute on chronic kidney injury.  Patient was just discharged from hospitalization for COVID pneumonia on 02/10/2023.  Presented to the ED per recommendations of the COVID surveillance team.  Did not have any definitive acute respiratory findings to indicate acute ongoing infectious COVID, chest x-ray was clear.  However creatinine was significantly elevated up to 3.8 whereas baseline was in the 2s.  Was started on IV fluids with trivial improvement. Cr 3.7 today     2/10/23 TTE   The left ventricle is normal in size with normal systolic function. The estimated ejection fraction is 65%.  Normal right ventricular size with normal right ventricular systolic function.  Grade II left ventricular diastolic dysfunction.  Severe left atrial enlargement.  There is mild aortic valve stenosis. Aortic valve area is 1.8 cm2; peak velocity is 2.9 m/s; mean gradient is 15 mmHg.  Pulmonary HTN - the estimated PA systolic pressure is > 41mm Hg. (The IVC was not well visualized.)         Past Medical History:   Diagnosis Date    (HFpEF) heart failure with preserved ejection fraction 9/25/2017     "Atrial fibrillation     CAD (coronary artery disease)     CHF (congestive heart failure)     Chronic diastolic heart failure 4/8/2020    CKD (chronic kidney disease) stage 3, GFR 30-59 ml/min     Dr. Latif    CKD (chronic kidney disease) stage 3, GFR 30-59 ml/min     Diverticulosis     Fever blister     Heart attack 2006    Hyperlipidemia     Hypertension     Immunodeficiency due to treatment with immunosuppressive medication     Keloid cicatrix     Left lower quadrant abdominal pain 6/5/2022    Metabolic bone disease     Pericarditis     S/P kidney transplant     Supraventricular tachycardia 06/2019    Thrombocytopenia     Thyroid disease     Tobacco use 9/5/2014    Unspecified disorder of kidney and ureter     Stage IIICKD       Past Surgical History:   Procedure Laterality Date    CARDIOVERSION  04/30/15    CHOLECYSTECTOMY      COLONOSCOPY  April 20, 2011    Diverticulosis, repeat recommended in 3 yrs., repeat colonoscopy 2014 revealed 2 polyps.  He should return in 5 years.    COLONOSCOPY N/A 3/13/2020    Procedure: COLONOSCOPY;  Surgeon: Chon Casper MD;  Location: [facility] (4TH FLR);  Service: Endoscopy;  Laterality: N/A;  ok to hold coumadin x5days-see telephone encounter 2/4/20-tb    COLONOSCOPY N/A 2/4/2021    Procedure: COLONOSCOPY;  Surgeon: Chon Casper MD;  Location: [facility] (4TH FLR);  Service: Endoscopy;  Laterality: N/A;  Eliquis - per Dr. GAVIN Blunt ok to hold x 2 days and "restarted asap"- ERW  last prep "poor", [id number] ordered/ Pt requests Dr. Casper  prep ins. mailed - COVID screening on 2/1/21 PCW- ERW    COLONOSCOPY N/A 3/3/2021    Procedure: COLONOSCOPY;  Surgeon: Chon Casper MD;  Location: [facility] (4TH FLR);  Service: Endoscopy;  Laterality: N/A;  Patient with his second poor bowel pre and has poor prep today.  If patient not intersted or can't get colonoscopy tomorrow will need constipation bowel prep and will need to restart his Eliquis today.Thanks,Chon nunez Dr" Merlene-ok to hold Eliquis 2 days prior      COVID     CORONARY ANGIOPLASTY WITH STENT PLACEMENT  9/13/2006    KIDNEY TRANSPLANT  2005    PARATHYROIDECTOMY      TREATMENT OF CARDIAC ARRHYTHMIA N/A 3/28/2022    Procedure: CARDIOVERSION;  Surgeon: Migue Blunt MD;  Location: [facility];  Service: Cardiology;  Laterality: N/A;  AF, DCC, MAC, GP, 3 PREP*RODRIGO deferred pt 100% compliant*       Review of patient's allergies indicates:   Allergen Reactions    Ace inhibitors Swelling    Verapamil Other (See Comments)     Other reaction(s): Unknown    Povidone-iodine Itching       No current facility-administered medications on file prior to encounter.     Current Outpatient Medications on File Prior to Encounter   Medication Sig    acetaminophen (TYLENOL) 500 MG tablet Take 1 tablet (500 mg total) by mouth every 6 (six) hours as needed for Pain.    albuterol (PROVENTIL) 2.5 mg /3 mL (0.083 %) nebulizer solution Take 3 mLs (2.5 mg total) by nebulization every 6 (six) hours as needed for Wheezing. Rescue    albuterol (VENTOLIN HFA) 90 mcg/actuation inhaler Inhale 2 puffs into the lungs every 6 (six) hours as needed for Wheezing or Shortness of Breath. Rescue    ammonium lactate 12 % Crea Apply 1 g topically once daily.    aspirin (ECOTRIN) 81 MG EC tablet Take 1 tablet by mouth Daily.    atorvastatin (LIPITOR) 10 MG tablet Take 10 mg by mouth.    calcium carbonate (CALCIUM 600 ORAL) Take by mouth. Pt taking 1200 daily    clonazePAM (KLONOPIN) 0.5 MG tablet Take 1 tablet (0.5 mg total) by mouth 2 (two) times daily as needed for Anxiety. (Patient not taking: Reported on 1/5/2023)    colchicine (MITIGARE) 0.6 mg Cap One po BID prn gout flare up to 3 days    cyclobenzaprine (FLEXERIL) 5 MG tablet Take 5 mg by mouth 2 (two) times daily as needed.    doxazosin (CARDURA) 4 MG tablet TAKE 1 TABLET EVERY 12 HOURS    ELIQUIS 5 mg Tab TAKE 1 TABLET BY MOUTH TWICE A DAY    famotidine (PEPCID) 20 MG tablet TAKE 1 TABLET TWICE DAILY     fexofenadine (ALLEGRA) 60 MG tablet Take 1 tablet by mouth Twice daily as needed.    flecainide (TAMBOCOR) 50 MG Tab Take 1 tablet (50 mg total) by mouth every 12 (twelve) hours.    fluticasone propion-salmeterol 115-21 mcg/dose (ADVAIR HFA) 115-21 mcg/actuation HFAA inhaler Inhale 2 puffs into the lungs every 12 (twelve) hours. Controller    fluticasone propionate (FLONASE) 50 mcg/actuation nasal spray 1 spray (50 mcg total) by Each Nostril route once daily.    furosemide (LASIX) 40 MG tablet Take 0.5 tablets (20 mg total) by mouth daily as needed (wt gain of 3 or more pounds in 1 day or 5 or more pounds in a week).    gabapentin (NEURONTIN) 300 MG capsule TAKE 1 CAPSULE BY MOUTH TWICE A DAY    hydrALAZINE (APRESOLINE) 50 MG tablet TAKE 1 TABLET (50 MG TOTAL) BY MOUTH 3 (THREE) TIMES DAILY.    hyoscyamine (LEVSIN/SL) 0.125 mg Subl Place 1 tablet (0.125 mg total) under the tongue every 4 (four) hours as needed. (Patient not taking: Reported on 2/13/2023)    LIDOcaine (LIDODERM) 5 % Place 1 patch onto the skin daily as needed (back pain). Remove & Discard patch within 12 hours or as directed by MD    metoprolol succinate (TOPROL-XL) 25 MG 24 hr tablet Take 1 tablet (25 mg total) by mouth once daily.    multivitamin (THERAGRAN) per tablet Take 1 tablet by mouth Daily.    NIFEdipine (PROCARDIA-XL) 60 MG (OSM) 24 hr tablet Take 1 tablet (60 mg total) by mouth 2 (two) times a day.    omeprazole (PRILOSEC) 20 MG capsule Take 1 capsule (20 mg total) by mouth once daily.    paricalcitoL (ZEMPLAR) 1 MCG capsule TAKE 1 CAPSULE ON MONDAY, WEDNESDAY AND FRIDAY    potassium chloride (KLOR-CON 10) 10 MEQ TbSR Take 1 tablet (10 mEq total) by mouth daily as needed (to be taken with lasix).    predniSONE (DELTASONE) 5 MG tablet TAKE 1 TABLET BY MOUTH EVERY DAY IN THE MORNING    pulse oximeter (PULSE OXIMETER) device by Apply Externally route 2 (two) times a day. Use twice daily at 8 AM and 3 PM and record the value in AF83 as  directed.    tacrolimus (PROGRAF) 0.5 MG Cap Take by mouth.    tacrolimus (PROGRAF) 1 MG Cap Two po in am and two po in pm    vitamin D 1000 units Tab Take 370 mg by mouth 2 (two) times daily. 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     Patient is currently retired   Substance and Sexual Activity    Alcohol use: No    Drug use: No    Sexual activity: Yes     Partners: Female     Review of Systems   Constitutional: Negative for fever and malaise/fatigue.   Eyes:  Negative for blurred vision and pain.   Cardiovascular:  Negative for chest pain, dyspnea on exertion, leg swelling, orthopnea, palpitations and paroxysmal nocturnal dyspnea.   Respiratory:  Negative for cough, shortness of breath, sputum production and wheezing.    Hematologic/Lymphatic: Negative for adenopathy and bleeding problem.   Skin:  Negative for rash.   Musculoskeletal:  Negative for back pain and neck pain.   Gastrointestinal:  Negative for abdominal pain, constipation, diarrhea, nausea and vomiting.   Genitourinary:  Negative for dysuria.   Neurological:  Negative for dizziness, headaches, light-headedness and weakness.   Objective:     Vital Signs (Most Recent):  Temp: 98 °F (36.7 °C) (23 0736)  Pulse: 77 (23 0810)  Resp: 17 (23 0810)  BP: (!) 168/72 (23 0736)  SpO2: (!) 92 % (23 0810)   Vital Signs (24h Range):  Temp:  [72 °F (22.2 °C)-98.6 °F (37 °C)] 98 °F (36.7 °C)  Pulse:  [60-77] 77  Resp:  [16-20]  17  SpO2:  [90 %-97 %] 92 %  BP: (129-168)/(63-88) 168/72       Weight: 101.6 kg (224 lb)  Body mass index is 29.55 kg/m².    SpO2: (!) 92 %       Physical Exam  Constitutional:       General: He is not in acute distress.     Appearance: Normal appearance. He is not ill-appearing, toxic-appearing or diaphoretic.   HENT:      Head: Normocephalic and atraumatic.      Nose: Nose normal.   Eyes:      Extraocular Movements: Extraocular movements intact.      Pupils: Pupils are equal, round, and reactive to light.   Cardiovascular:      Rate and Rhythm: Normal rate and regular rhythm.      Heart sounds: No murmur heard.    No friction rub. No gallop.   Pulmonary:      Effort: Pulmonary effort is normal. No respiratory distress.      Breath sounds: Normal breath sounds. No wheezing or rales.   Abdominal:      General: Abdomen is flat. There is no distension.      Palpations: Abdomen is soft. There is no mass.      Tenderness: There is no abdominal tenderness.   Musculoskeletal:         General: No swelling. Normal range of motion.      Cervical back: Normal range of motion. No rigidity.      Right lower leg: No edema.      Left lower leg: No edema.   Skin:     General: Skin is warm and dry.      Coloration: Skin is not jaundiced.      Findings: No bruising.   Neurological:      General: No focal deficit present.      Mental Status: He is alert and oriented to person, place, and time.      Cranial Nerves: No cranial nerve deficit.      Motor: No weakness.       Significant Labs: BMP:   Recent Labs   Lab 02/25/23  1402 02/26/23  0532   * 80   * 135*   K 4.8 4.1    104   CO2 21* 18*   BUN 46* 48*   CREATININE 3.6* 3.7*   CALCIUM 7.3* 7.3*   MG 2.4 2.3    and CBC:   Recent Labs   Lab 02/25/23  1402 02/26/23  0532   WBC 8.11 8.01   HGB 7.0* 7.3*   HCT 23.1* 25.3*    232       Significant Imaging: Reviewed               Assessment and Plan:     Paroxysmal atrial fibrillation  Tele reviewed and pt noted  to be in NSR with HR in 70s     -Given kidney function, can hold flecainide at this time   -His [id number] is 4 (HFpEF, HTN, aortic atherosclerosis, male gender, age 65-74), thus would continue Eliquis at this time   -Continue Metoprolol 25 mg daily       Thank you for your consult. I will follow-up with patient. Please contact us if you have any additional questions.    Ruy García MD  Cardiac Electrophysiology  [facility] - Intensive Care ([place]

## 2023-02-26 NOTE — PLAN OF CARE
Problem: Adult Inpatient Plan of Care  Goal: Optimal Comfort and Wellbeing  Outcome: Ongoing, Progressing   Witness pt ambulate to restroom and back to bed. SOB noted on exertion. Pt ambulated with O2 per NC @2LPM. Pt positioned comfortably back in bed with HOB elevated.  Readjusted pearson, pt c/o pain/spasm to bladder. MD contacted, new order for oxybutin given. Wife at bs with patient at this time. Breathing unlabored, items within reach. All other needs met at this time.

## 2023-02-26 NOTE — SUBJECTIVE & OBJECTIVE
"Past Medical History:   Diagnosis Date    (HFpEF) heart failure with preserved ejection fraction 9/25/2017    Atrial fibrillation     CAD (coronary artery disease)     CHF (congestive heart failure)     Chronic diastolic heart failure 4/8/2020    CKD (chronic kidney disease) stage 3, GFR 30-59 ml/min     Dr. Latif    CKD (chronic kidney disease) stage 3, GFR 30-59 ml/min     Diverticulosis     Fever blister     Heart attack 2006    Hyperlipidemia     Hypertension     Immunodeficiency due to treatment with immunosuppressive medication     Keloid cicatrix     Left lower quadrant abdominal pain 6/5/2022    Metabolic bone disease     Pericarditis     S/P kidney transplant     Supraventricular tachycardia 06/2019    Thrombocytopenia     Thyroid disease     Tobacco use 9/5/2014    Unspecified disorder of kidney and ureter     Stage IIICKD       Past Surgical History:   Procedure Laterality Date    CARDIOVERSION  04/30/15    CHOLECYSTECTOMY      COLONOSCOPY  April 20, 2011    Diverticulosis, repeat recommended in 3 yrs., repeat colonoscopy 2014 revealed 2 polyps.  He should return in 5 years.    COLONOSCOPY N/A 3/13/2020    Procedure: COLONOSCOPY;  Surgeon: Chon Casper MD;  Location: [facility] (4TH FLR);  Service: Endoscopy;  Laterality: N/A;  ok to hold coumadin x5days-see telephone encounter 2/4/20-tb    COLONOSCOPY N/A 2/4/2021    Procedure: COLONOSCOPY;  Surgeon: Chon Casper MD;  Location: [facility] (4TH FLR);  Service: Endoscopy;  Laterality: N/A;  Eliquis - per Dr. GAVIN Blunt ok to hold x 2 days and "restarted asap"- ERW  last prep "poor", [id number] ordered/ Pt requests Dr. Casper  prep ins. mailed - COVID screening on 2/1/21 PCW- ERW    COLONOSCOPY N/A 3/3/2021    Procedure: COLONOSCOPY;  Surgeon: Chon Casper MD;  Location: [facility] (4TH FLR);  Service: Endoscopy;  Laterality: N/A;  Patient with his second poor bowel pre and has poor prep today.  If patient not intersted or can't get colonoscopy " tomorrow will need constipation bowel prep and will need to restart his Eliquis today.Thanks,Chon     per Dr Blunt-ok to hold Eliquis 2 days prior      COVID     CORONARY ANGIOPLASTY WITH STENT PLACEMENT  9/13/2006    KIDNEY TRANSPLANT  2005    PARATHYROIDECTOMY      TREATMENT OF CARDIAC ARRHYTHMIA N/A 3/28/2022    Procedure: CARDIOVERSION;  Surgeon: Migue Blunt MD;  Location: [facility];  Service: Cardiology;  Laterality: N/A;  AF, DCC, MAC, GP, 3 PREP*RODRIGO deferred pt 100% compliant*       Review of patient's allergies indicates:   Allergen Reactions    Ace inhibitors Swelling    Verapamil Other (See Comments)     Other reaction(s): Unknown    Povidone-iodine Itching       No current facility-administered medications on file prior to encounter.     Current Outpatient Medications on File Prior to Encounter   Medication Sig    acetaminophen (TYLENOL) 500 MG tablet Take 1 tablet (500 mg total) by mouth every 6 (six) hours as needed for Pain.    albuterol (PROVENTIL) 2.5 mg /3 mL (0.083 %) nebulizer solution Take 3 mLs (2.5 mg total) by nebulization every 6 (six) hours as needed for Wheezing. Rescue    albuterol (VENTOLIN HFA) 90 mcg/actuation inhaler Inhale 2 puffs into the lungs every 6 (six) hours as needed for Wheezing or Shortness of Breath. Rescue    ammonium lactate 12 % Crea Apply 1 g topically once daily.    aspirin (ECOTRIN) 81 MG EC tablet Take 1 tablet by mouth Daily.    atorvastatin (LIPITOR) 10 MG tablet Take 10 mg by mouth.    calcium carbonate (CALCIUM 600 ORAL) Take by mouth. Pt taking 1200 daily    clonazePAM (KLONOPIN) 0.5 MG tablet Take 1 tablet (0.5 mg total) by mouth 2 (two) times daily as needed for Anxiety. (Patient not taking: Reported on 1/5/2023)    colchicine (MITIGARE) 0.6 mg Cap One po BID prn gout flare up to 3 days    cyclobenzaprine (FLEXERIL) 5 MG tablet Take 5 mg by mouth 2 (two) times daily as needed.    doxazosin (CARDURA) 4 MG tablet TAKE 1 TABLET EVERY 12 HOURS    ELIQUIS 5  mg Tab TAKE 1 TABLET BY MOUTH TWICE A DAY    famotidine (PEPCID) 20 MG tablet TAKE 1 TABLET TWICE DAILY    fexofenadine (ALLEGRA) 60 MG tablet Take 1 tablet by mouth Twice daily as needed.    flecainide (TAMBOCOR) 50 MG Tab Take 1 tablet (50 mg total) by mouth every 12 (twelve) hours.    fluticasone propion-salmeterol 115-21 mcg/dose (ADVAIR HFA) 115-21 mcg/actuation HFAA inhaler Inhale 2 puffs into the lungs every 12 (twelve) hours. Controller    fluticasone propionate (FLONASE) 50 mcg/actuation nasal spray 1 spray (50 mcg total) by Each Nostril route once daily.    furosemide (LASIX) 40 MG tablet Take 0.5 tablets (20 mg total) by mouth daily as needed (wt gain of 3 or more pounds in 1 day or 5 or more pounds in a week).    gabapentin (NEURONTIN) 300 MG capsule TAKE 1 CAPSULE BY MOUTH TWICE A DAY    hydrALAZINE (APRESOLINE) 50 MG tablet TAKE 1 TABLET (50 MG TOTAL) BY MOUTH 3 (THREE) TIMES DAILY.    hyoscyamine (LEVSIN/SL) 0.125 mg Subl Place 1 tablet (0.125 mg total) under the tongue every 4 (four) hours as needed. (Patient not taking: Reported on 2/13/2023)    LIDOcaine (LIDODERM) 5 % Place 1 patch onto the skin daily as needed (back pain). Remove & Discard patch within 12 hours or as directed by MD    metoprolol succinate (TOPROL-XL) 25 MG 24 hr tablet Take 1 tablet (25 mg total) by mouth once daily.    multivitamin (THERAGRAN) per tablet Take 1 tablet by mouth Daily.    NIFEdipine (PROCARDIA-XL) 60 MG (OSM) 24 hr tablet Take 1 tablet (60 mg total) by mouth 2 (two) times a day.    omeprazole (PRILOSEC) 20 MG capsule Take 1 capsule (20 mg total) by mouth once daily.    paricalcitoL (ZEMPLAR) 1 MCG capsule TAKE 1 CAPSULE ON MONDAY, WEDNESDAY AND FRIDAY    potassium chloride (KLOR-CON 10) 10 MEQ TbSR Take 1 tablet (10 mEq total) by mouth daily as needed (to be taken with lasix).    predniSONE (DELTASONE) 5 MG tablet TAKE 1 TABLET BY MOUTH EVERY DAY IN THE MORNING    pulse oximeter (PULSE OXIMETER) device by Apply  Externally route 2 (two) times a day. Use twice daily at 8 AM and 3 PM and record the value in MyChart as directed.    tacrolimus (PROGRAF) 0.5 MG Cap Take by mouth.    tacrolimus (PROGRAF) 1 MG Cap Two po in am and two po in pm    vitamin D 1000 units Tab Take 370 mg by mouth 2 (two) times daily. 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     Patient is currently retired   Substance and Sexual Activity    Alcohol use: No    Drug use: No    Sexual activity: Yes     Partners: Female     Review of Systems   Constitutional: Negative for fever and malaise/fatigue.   Eyes:  Negative for blurred vision and pain.   Cardiovascular:  Negative for chest pain, dyspnea on exertion, leg swelling, orthopnea, palpitations and paroxysmal nocturnal dyspnea.   Respiratory:  Negative for cough, shortness of breath, sputum production and wheezing.    Hematologic/Lymphatic: Negative for adenopathy and bleeding problem.   Skin:  Negative for rash.   Musculoskeletal:  Negative for back pain and neck pain.   Gastrointestinal:  Negative for abdominal pain, constipation, diarrhea, nausea and vomiting.   Genitourinary:  Negative for dysuria.   Neurological:  Negative for dizziness, headaches, light-headedness and weakness.   Objective:     Vital Signs (Most Recent):  Temp: 98 °F (36.7 °C) (23)  Pulse: 77 (2310)  Resp: 17 (23)  BP: (!) 168/72 (23 0736)  SpO2: (!) 92 % (23)   Vital Signs (24h  Range):  Temp:  [72 °F (22.2 °C)-98.6 °F (37 °C)] 98 °F (36.7 °C)  Pulse:  [60-77] 77  Resp:  [16-20] 17  SpO2:  [90 %-97 %] 92 %  BP: (129-168)/(63-88) 168/72       Weight: 101.6 kg (224 lb)  Body mass index is 29.55 kg/m².    SpO2: (!) 92 %       Physical Exam  Constitutional:       General: He is not in acute distress.     Appearance: Normal appearance. He is not ill-appearing, toxic-appearing or diaphoretic.   HENT:      Head: Normocephalic and atraumatic.      Nose: Nose normal.   Eyes:      Extraocular Movements: Extraocular movements intact.      Pupils: Pupils are equal, round, and reactive to light.   Cardiovascular:      Rate and Rhythm: Normal rate and regular rhythm.      Heart sounds: No murmur heard.    No friction rub. No gallop.   Pulmonary:      Effort: Pulmonary effort is normal. No respiratory distress.      Breath sounds: Normal breath sounds. No wheezing or rales.   Abdominal:      General: Abdomen is flat. There is no distension.      Palpations: Abdomen is soft. There is no mass.      Tenderness: There is no abdominal tenderness.   Musculoskeletal:         General: No swelling. Normal range of motion.      Cervical back: Normal range of motion. No rigidity.      Right lower leg: No edema.      Left lower leg: No edema.   Skin:     General: Skin is warm and dry.      Coloration: Skin is not jaundiced.      Findings: No bruising.   Neurological:      General: No focal deficit present.      Mental Status: He is alert and oriented to person, place, and time.      Cranial Nerves: No cranial nerve deficit.      Motor: No weakness.       Significant Labs: BMP:   Recent Labs   Lab 02/25/23  1402 02/26/23  0532   * 80   * 135*   K 4.8 4.1    104   CO2 21* 18*   BUN 46* 48*   CREATININE 3.6* 3.7*   CALCIUM 7.3* 7.3*   MG 2.4 2.3    and CBC:   Recent Labs   Lab 02/25/23  1402 02/26/23  0532   WBC 8.11 8.01   HGB 7.0* 7.3*   HCT 23.1* 25.3*    232       Significant Imaging:  Reviewed    Mohs Case Number (Optional): [id number] Mohs Case Number (Optional): [id number]

## 2023-02-26 NOTE — HPI
Ibrahima Phelps is a 66 y.o. male who presents for follow-up of Atrial Fibrillation     PROBLEM LIST:  PAF on Flecainide   CAD s/p PCI 2006  HFpEF  HTN  HLD  Carotid stenoses (20-39% bilateral)  H/o pericarditis  CKD3  S/p kidney transplant  Former smoker     HPI:Last seen in clinic by Dr. Parmar on 1/6/23. At that time pt was noted to be in NSR and continued on Flecainide at that time. Admitted for acute on chronic kidney injury.  Patient was just discharged from hospitalization for COVID pneumonia on 02/10/2023.  Presented to the ED per recommendations of the COVID surveillance team.  Did not have any definitive acute respiratory findings to indicate acute ongoing infectious COVID, chest x-ray was clear.  However creatinine was significantly elevated up to 3.8 whereas baseline was in the 2s.  Was started on IV fluids with trivial improvement. Cr 3.7 today     2/10/23 TTE   The left ventricle is normal in size with normal systolic function. The estimated ejection fraction is 65%.  Normal right ventricular size with normal right ventricular systolic function.  Grade II left ventricular diastolic dysfunction.  Severe left atrial enlargement.  There is mild aortic valve stenosis. Aortic valve area is 1.8 cm2; peak velocity is 2.9 m/s; mean gradient is 15 mmHg.  Pulmonary HTN - the estimated PA systolic pressure is > 41mm Hg. (The IVC was not well visualized.)

## 2023-02-26 NOTE — ASSESSMENT & PLAN NOTE
- suspect may be 2/2 gout flare  - h/o gout   - followup Xray right ankle and uric acid level   - started colchicine 0.3 mg daily due to KATINA  - continue to monitor

## 2023-02-26 NOTE — ASSESSMENT & PLAN NOTE
Baseline seems to be a creatinine in the 2s   Cr still elevated unchanged at 3.8 despite IVF. Scr increased to 4.0 --> 3.8  US no overly revealing; Transplant working up rejection  - nephrology consulted. apprec recs  -- followup DSA  -- off IVF. Ordered IV lasix 40 mg BID x 2 doses on 2/23. Started oral lasix 40 mg BID and now transitioning to IV lasix 40 mg BID  -- BMP daily

## 2023-02-26 NOTE — PROGRESS NOTES
[facility] - Intensive Care ([address] Medicine  Progress Note    Patient Name: Ibrahima Phelps  MRN: [id number]  Patient Class: IP- Inpatient   Admission Date: 2/20/2023  Length of Stay: 6 days  Attending Physician: Morris Payan MD  Primary Care Provider: Connie Magdaleno MD        Subjective:     Principal Problem:Acute kidney injury superimposed on CKD        HPI:  67-year-old black male transplant kidney patient being admitted for KATINA.  Patient was just discharged from a hospitalization for COVID pneumonia on 02/10/2023.  He also paid in ED visit a few days ago for what could have been a mild cellulitis/folliculitis on his foot for which he is almost done with his Keflex regimen.    Patient was instructed to come to the ED this time by the COVID surveillance nurses due to desaturation down to 88%.  When I talked with the patient and his wife in detail, they affirmed that he was not having any new or worsening shortness of breath since his discharge.  His wife did report that once the patient woke up/sat up his desaturation spontaneously resolved.  Patient denies any steven current shortness of breath or chest pain.  No nausea vomiting or flu fever like feeling.  In my discussion with the ED provider who was noted that the patient was complaining of shortness of breath but there was a concern that this likely was anxiety triggered.  Patient seems to be saturating well on room air here in the ED and chest x-ray which I personally reviewed is clear bilaterally.  Wife reports patient has been compliant with his medications.    The blood work did demonstrate an acutely elevated creatinine of 3.8 whereas his baseline is in the 2s.      Overview/Hospital Course:  67-year-old renal transplant black male was admitted for acute on chronic kidney injury.  Patient was just discharged from hospitalization for COVID pneumonia on 02/10/2023.  Presented to the ED per recommendations of the COVID surveillance team.   Did not have any definitive acute respiratory findings to indicate acute ongoing infectious COVID, chest x-ray was clear.  However creatinine was significantly elevated up to 3.8 whereas baseline was in the 2s.  Was started on IV fluids with trivial improvement.      Interval History: Patient lying in bed, no acute distress. Wife at bedside. Patient reports continued improvement in SOB and cough. He does note sleep disturbances and some confusion. Patient also reports new onset right ankle pain. Ordered gout workup and started tramadol prn. Started depakote to help with suspected hospital acquired delirium. Scr increased from 3.6 to 3.7. CXR showed pulmonary edema and pleural effusions. Weaned from 3 L NC to 2 L NC. Per nephrology, suspect that there is some element of transplant rejection but patient high risk for biopsy given recent COVID infection and on Eliquis. transitioned from oral to IV lasix. Continue pearson. Ordered respiratory infection panel. DSA pending. Per EP cardiology, holding home flecainide due to KATINA. PT/OT ordered.       Intake/Output Summary (Last 24 hours) at 2/26/2023 1650  Last data filed at 2/25/2023 1837  Gross per 24 hour   Intake 240 ml   Output --   Net 240 ml           Review of Systems   Constitutional:  Positive for activity change and fatigue. Negative for chills and fever.   HENT:  Positive for voice change. Negative for congestion and trouble swallowing.    Eyes:  Negative for visual disturbance.   Respiratory:  Positive for cough and shortness of breath.    Cardiovascular:  Negative for chest pain and leg swelling.   Gastrointestinal:  Negative for abdominal distention, abdominal pain, nausea and vomiting.   Endocrine: Negative for cold intolerance, heat intolerance, polydipsia and polyuria.   Genitourinary:  Negative for difficulty urinating and dysuria.        Urinary incontinence   Musculoskeletal:  Positive for arthralgias (right ankle). Negative for back pain and myalgias.    Skin:  Negative for rash and wound.   Neurological:  Positive for weakness. Negative for dizziness and light-headedness.   Hematological:  Negative for adenopathy. Does not bruise/bleed easily.   Psychiatric/Behavioral:  Negative for confusion and sleep disturbance.      Objective:     Vital Signs (Most Recent):  Temp: 98 °F (36.7 °C) (02/26/23 1535)  Pulse: 77 (02/26/23 1535)  Resp: 18 (02/26/23 1535)  BP: (!) 148/68 (02/26/23 1535)  SpO2: 95 % (02/26/23 1535)   Vital Signs (24h Range):  Temp:  [72 °F (22.2 °C)-98.6 °F (37 °C)] 98 °F (36.7 °C)  Pulse:  [60-77] 77  Resp:  [16-28] 18  SpO2:  [90 %-99 %] 95 %  BP: (129-176)/(63-88) 148/68     Weight: 101.6 kg (224 lb)  Body mass index is 29.55 kg/m².    Intake/Output Summary (Last 24 hours) at 2/26/2023 1650  Last data filed at 2/25/2023 1837  Gross per 24 hour   Intake 240 ml   Output --   Net 240 ml        Physical Exam  Constitutional:       Appearance: He is well-developed.   HENT:      Head: Normocephalic and atraumatic.      Mouth/Throat:      Mouth: Mucous membranes are moist.      Comments: hoarseness  Eyes:      General: No scleral icterus.     Extraocular Movements: Extraocular movements intact.      Pupils: Pupils are equal, round, and reactive to light.   Neck:      Vascular: No JVD.   Cardiovascular:      Rate and Rhythm: Normal rate and regular rhythm.      Heart sounds: No murmur heard.    No friction rub. No gallop.   Pulmonary:      Effort: Pulmonary effort is normal. No respiratory distress.      Breath sounds: Examination of the right-lower field reveals decreased breath sounds. Examination of the left-lower field reveals decreased breath sounds. Decreased breath sounds, wheezing and rhonchi present. No rales.   Abdominal:      General: Bowel sounds are normal. There is no distension.      Palpations: Abdomen is soft.      Tenderness: There is no abdominal tenderness.   Musculoskeletal:         General: Tenderness (right ankle) present. No  deformity. Normal range of motion.      Cervical back: Neck supple.   Lymphadenopathy:      Cervical: No cervical adenopathy.   Skin:     General: Skin is warm and dry.      Capillary Refill: Capillary refill takes less than 2 seconds.      Findings: No erythema or rash.   Neurological:      General: No focal deficit present.      Mental Status: He is alert and oriented to person, place, and time.      Cranial Nerves: No cranial nerve deficit.      Sensory: No sensory deficit.      Motor: Weakness present.   Psychiatric:         Mood and Affect: Mood normal.       Significant Labs: A1C:   Recent Labs   Lab 09/08/22  0909 01/04/23  0819   HGBA1C 5.4 5.5       CBC:   Recent Labs   Lab 02/25/23  1402 02/26/23  0532   WBC 8.11 8.01   HGB 7.0* 7.3*   HCT 23.1* 25.3*    232       CMP:   Recent Labs   Lab 02/25/23  1402 02/26/23  0532   * 135*   K 4.8 4.1    104   CO2 21* 18*   * 80   BUN 46* 48*   CREATININE 3.6* 3.7*   CALCIUM 7.3* 7.3*   ANIONGAP 11 13       Coagulation: No results for input(s): PT, INR, APTT in the last 48 hours.  Lactic Acid:   No results for input(s): LACTATE in the last 48 hours.        Significant Imaging: I have reviewed all pertinent imaging results/findings within the past 24 hours.      Assessment/Plan:      * Acute kidney injury superimposed on CKD  Baseline seems to be a creatinine in the 2s   Cr still elevated unchanged at 3.8 despite IVF. Scr increased to 4.0 --> 3.8  US no overly revealing; Transplant working up rejection  - nephrology consulted. apprec recs  -- followup DSA  -- off IVF. Ordered IV lasix 40 mg BID x 2 doses on 2/23. Started oral lasix 40 mg BID and now transitioning to IV lasix 40 mg BID  -- BMP daily           Right ankle pain  - suspect may be 2/2 gout flare  - h/o gout   - followup Xray right ankle and uric acid level   - started colchicine 0.3 mg daily due to KATINA  - continue to monitor       Hypoxia  - continue IV diuresis   - weaning  "supplemental O2 as tolerated  - see "acute on chronic diastolic heart failure"      Anemia of renal disease  Acute anemia  1 unit prbc ordered on 2/22  - H/H stable      Wheezing  - albuterol inhaler prn      Chronic dyspnea  -Unchanged prior to admission; Don't suspect acute covid  -CT chest w/o contrast showing BL pleural effusions; d/w IR no need to hold anticoagulants; thoracentesis order; shows infiltrates but I believe these are older post covid inflammatory findings  -tx underlying anemia - see below  - weaned from 4 L NC to 2 L NC and now up to 3 L NC  - continue supportive care: albuterol prn, incentive spirometry, anti-tussive meds, chest PT, acapella, OOB to chair  -covid + on 2/7/23  - IMPROVING    Cellulitis of foot  Finish regimen of cephalex in a few days      Hypocalcemia  - replete prn      Anemia due to stage 4 chronic kidney disease  Stable, monitor      Acute on chronic diastolic heart failure  - Initially held home dosing of p.r.n. Lasix given KATINA and did trial of IVF but no improvement in Scr  - nephrology following   - started trial of IV lasix on 2/23 with improvement in Scr. Transitioned to oral lasix 40 mg BID and now on IV lasix 40 mg BID  - continue home metoprolol  - followup BMP    Paroxysmal atrial fibrillation  Continue home Eliquis, flecainide, metoprolol        H/O kidney transplant  [facility] transplant service following  Routine prograf levels  Continue home Prograf 2 mg b.i.d.  Continue home prednisone  - can restart on  BID at the time of discharge      CAD (coronary artery disease)  History of stenting years ago   Continue home aspirin and statin, Toprol      Hypertension  Continue home metoprolol, nifedipine, hydralazine      Long-term use of immunosuppressant medication  - see "kidney transplant"        VTE Risk Mitigation (From admission, onward)         Ordered     apixaban tablet 5 mg  2 times daily         02/22/23 1607                Discharge Planning   RAMU: 2/28/2023    "  Code Status: Prior   Is the patient medically ready for discharge?: No    Reason for patient still in hospital (select all that apply): Patient unstable, Patient new problem, Patient trending condition, Laboratory test, Treatment, Imaging, Consult recommendations, PT / OT recommendations and Pending disposition  Discharge Plan A: Home with family            Time spent in care of patient: > 35 minutes         Morris Payan MD  Department of Hospital Medicine   [facility] - Intensive Care ([place])

## 2023-02-27 LAB
ABO + RH BLD: NORMAL
ALBUMIN SERPL BCP-MCNC: 2.2 G/DL (ref 3.5–5.2)
ALLENS TEST: ABNORMAL
ALLENS TEST: NORMAL
ALLENS TEST: NORMAL
ALP SERPL-CCNC: 78 U/L (ref 55–135)
ALT SERPL W/O P-5'-P-CCNC: 20 U/L (ref 10–44)
ANION GAP SERPL CALC-SCNC: 12 MMOL/L (ref 8–16)
ANION GAP SERPL CALC-SCNC: 12 MMOL/L (ref 8–16)
AST SERPL-CCNC: 25 U/L (ref 10–40)
BASOPHILS # BLD AUTO: 0.01 K/UL (ref 0–0.2)
BASOPHILS NFR BLD: 0.1 % (ref 0–1.9)
BILIRUB SERPL-MCNC: 0.4 MG/DL (ref 0.1–1)
BLD GP AB SCN CELLS X3 SERPL QL: NORMAL
BLD PROD TYP BPU: NORMAL
BLOOD UNIT EXPIRATION DATE: NORMAL
BLOOD UNIT TYPE CODE: 7300
BLOOD UNIT TYPE: NORMAL
BNP SERPL-MCNC: 761 PG/ML (ref 0–99)
BUN SERPL-MCNC: 46 MG/DL (ref 8–23)
BUN SERPL-MCNC: 50 MG/DL (ref 8–23)
CALCIUM SERPL-MCNC: 7.6 MG/DL (ref 8.7–10.5)
CALCIUM SERPL-MCNC: 7.7 MG/DL (ref 8.7–10.5)
CHLORIDE SERPL-SCNC: 102 MMOL/L (ref 95–110)
CHLORIDE SERPL-SCNC: 104 MMOL/L (ref 95–110)
CO2 SERPL-SCNC: 22 MMOL/L (ref 23–29)
CO2 SERPL-SCNC: 22 MMOL/L (ref 23–29)
CODING SYSTEM: NORMAL
CREAT SERPL-MCNC: 3.8 MG/DL (ref 0.5–1.4)
CREAT SERPL-MCNC: 3.9 MG/DL (ref 0.5–1.4)
CROSSMATCH INTERPRETATION: NORMAL
DIFFERENTIAL METHOD: ABNORMAL
DISPENSE STATUS: NORMAL
EOSINOPHIL # BLD AUTO: 0.1 K/UL (ref 0–0.5)
EOSINOPHIL NFR BLD: 0.5 % (ref 0–8)
ERYTHROCYTE [DISTWIDTH] IN BLOOD BY AUTOMATED COUNT: 16 % (ref 11.5–14.5)
ERYTHROCYTE [DISTWIDTH] IN BLOOD BY AUTOMATED COUNT: 16 % (ref 11.5–14.5)
EST. GFR  (NO RACE VARIABLE): 16 ML/MIN/1.73 M^2
EST. GFR  (NO RACE VARIABLE): 16.5 ML/MIN/1.73 M^2
GLUCOSE SERPL-MCNC: 115 MG/DL (ref 70–110)
GLUCOSE SERPL-MCNC: 89 MG/DL (ref 70–110)
HCO3 UR-SCNC: 23.7 MMOL/L (ref 24–28)
HCO3 UR-SCNC: 24.4 MMOL/L (ref 24–28)
HCO3 UR-SCNC: 26.2 MMOL/L (ref 24–28)
HCT VFR BLD AUTO: 23.1 % (ref 40–54)
HCT VFR BLD AUTO: 23.3 % (ref 40–54)
HCT VFR BLD CALC: 20 %PCV (ref 36–54)
HCT VFR BLD CALC: 22 %PCV (ref 36–54)
HCT VFR BLD CALC: 23 %PCV (ref 36–54)
HGB BLD-MCNC: 6.6 G/DL (ref 14–18)
HGB BLD-MCNC: 6.8 G/DL (ref 14–18)
IMM GRANULOCYTES # BLD AUTO: 0.07 K/UL (ref 0–0.04)
IMM GRANULOCYTES NFR BLD AUTO: 0.6 % (ref 0–0.5)
INFLUENZA A, MOLECULAR: NOT DETECTED
INFLUENZA B, MOLECULAR: NOT DETECTED
LDH SERPL L TO P-CCNC: 0.47 MMOL/L (ref 0.36–1.25)
LDH SERPL L TO P-CCNC: 0.6 MMOL/L (ref 0.36–1.25)
LDH SERPL L TO P-CCNC: 1.33 MMOL/L (ref 0.36–1.25)
LYMPHOCYTES # BLD AUTO: 0.5 K/UL (ref 1–4.8)
LYMPHOCYTES NFR BLD: 4.6 % (ref 18–48)
MAGNESIUM SERPL-MCNC: 2.3 MG/DL (ref 1.6–2.6)
MAGNESIUM SERPL-MCNC: 2.5 MG/DL (ref 1.6–2.6)
MCH RBC QN AUTO: 23.5 PG (ref 27–31)
MCH RBC QN AUTO: 23.7 PG (ref 27–31)
MCHC RBC AUTO-ENTMCNC: 28.6 G/DL (ref 32–36)
MCHC RBC AUTO-ENTMCNC: 29.2 G/DL (ref 32–36)
MCV RBC AUTO: 81 FL (ref 82–98)
MCV RBC AUTO: 83 FL (ref 82–98)
MONOCYTES # BLD AUTO: 0.7 K/UL (ref 0.3–1)
MONOCYTES NFR BLD: 6.9 % (ref 4–15)
NEUTROPHILS # BLD AUTO: 9.4 K/UL (ref 1.8–7.7)
NEUTROPHILS NFR BLD: 87.3 % (ref 38–73)
NRBC BLD-RTO: 0 /100 WBC
NUM UNITS TRANS PACKED RBC: NORMAL
PCO2 BLDA: 31.1 MMHG (ref 35–45)
PCO2 BLDA: 32.5 MMHG (ref 35–45)
PCO2 BLDA: 35.9 MMHG (ref 35–45)
PH SMN: 7.47 [PH] (ref 7.35–7.45)
PH SMN: 7.48 [PH] (ref 7.35–7.45)
PH SMN: 7.49 [PH] (ref 7.35–7.45)
PHOSPHATE SERPL-MCNC: 5.9 MG/DL (ref 2.7–4.5)
PHOSPHATE SERPL-MCNC: 6.2 MG/DL (ref 2.7–4.5)
PLATELET # BLD AUTO: 223 K/UL (ref 150–450)
PLATELET # BLD AUTO: 230 K/UL (ref 150–450)
PMV BLD AUTO: 10.7 FL (ref 9.2–12.9)
PMV BLD AUTO: 11.9 FL (ref 9.2–12.9)
PO2 BLDA: 118 MMHG (ref 80–100)
PO2 BLDA: 44 MMHG (ref 80–100)
PO2 BLDA: 47 MMHG (ref 80–100)
POC BE: 0 MMOL/L
POC BE: 1 MMOL/L
POC BE: 3 MMOL/L
POC IONIZED CALCIUM: 0.98 MMOL/L (ref 1.06–1.42)
POC IONIZED CALCIUM: 1 MMOL/L (ref 1.06–1.42)
POC IONIZED CALCIUM: 1.01 MMOL/L (ref 1.06–1.42)
POC SATURATED O2: 84 % (ref 95–100)
POC SATURATED O2: 86 % (ref 95–100)
POC SATURATED O2: 99 % (ref 95–100)
POC TCO2: 25 MMOL/L (ref 23–27)
POC TCO2: 25 MMOL/L (ref 23–27)
POC TCO2: 27 MMOL/L (ref 23–27)
POTASSIUM BLD-SCNC: 3.8 MMOL/L (ref 3.5–5.1)
POTASSIUM BLD-SCNC: 4.3 MMOL/L (ref 3.5–5.1)
POTASSIUM BLD-SCNC: 4.3 MMOL/L (ref 3.5–5.1)
POTASSIUM SERPL-SCNC: 4 MMOL/L (ref 3.5–5.1)
POTASSIUM SERPL-SCNC: 4.7 MMOL/L (ref 3.5–5.1)
PROCALCITONIN SERPL IA-MCNC: 0.47 NG/ML
PROT SERPL-MCNC: 7.3 G/DL (ref 6–8.4)
RBC # BLD AUTO: 2.78 M/UL (ref 4.6–6.2)
RBC # BLD AUTO: 2.89 M/UL (ref 4.6–6.2)
RSV AG BY MOLECULAR METHOD: NOT DETECTED
SAMPLE: ABNORMAL
SAMPLE: NORMAL
SAMPLE: NORMAL
SARS-COV-2 RNA RESP QL NAA+PROBE: NOT DETECTED
SITE: ABNORMAL
SITE: NORMAL
SITE: NORMAL
SODIUM BLD-SCNC: 135 MMOL/L (ref 136–145)
SODIUM BLD-SCNC: 136 MMOL/L (ref 136–145)
SODIUM BLD-SCNC: 138 MMOL/L (ref 136–145)
SODIUM SERPL-SCNC: 136 MMOL/L (ref 136–145)
SODIUM SERPL-SCNC: 138 MMOL/L (ref 136–145)
TACROLIMUS BLD-MCNC: 5.5 NG/ML (ref 5–15)
WBC # BLD AUTO: 10.35 K/UL (ref 3.9–12.7)
WBC # BLD AUTO: 10.8 K/UL (ref 3.9–12.7)

## 2023-02-27 PROCEDURE — 84100 ASSAY OF PHOSPHORUS: CPT | Mod: 91 | Performed by: INTERNAL MEDICINE

## 2023-02-27 PROCEDURE — 12000002 HC ACUTE/MED SURGE SEMI-PRIVATE ROOM

## 2023-02-27 PROCEDURE — 99900035 HC TECH TIME PER 15 MIN (STAT)

## 2023-02-27 PROCEDURE — 25000003 PHARM REV CODE 250: Performed by: HOSPITALIST

## 2023-02-27 PROCEDURE — 94664 DEMO&/EVAL PT USE INHALER: CPT

## 2023-02-27 PROCEDURE — 99291 CRITICAL CARE FIRST HOUR: CPT | Mod: ,,, | Performed by: STUDENT IN AN ORGANIZED HEALTH CARE EDUCATION/TRAINING PROGRAM

## 2023-02-27 PROCEDURE — 25000003 PHARM REV CODE 250: Performed by: INTERNAL MEDICINE

## 2023-02-27 PROCEDURE — 80048 BASIC METABOLIC PNL TOTAL CA: CPT | Mod: XB | Performed by: INTERNAL MEDICINE

## 2023-02-27 PROCEDURE — 94668 MNPJ CHEST WALL SBSQ: CPT

## 2023-02-27 PROCEDURE — 97162 PT EVAL MOD COMPLEX 30 MIN: CPT

## 2023-02-27 PROCEDURE — 99233 SBSQ HOSP IP/OBS HIGH 50: CPT | Mod: GC,,, | Performed by: INTERNAL MEDICINE

## 2023-02-27 PROCEDURE — 97165 OT EVAL LOW COMPLEX 30 MIN: CPT

## 2023-02-27 PROCEDURE — 97530 THERAPEUTIC ACTIVITIES: CPT

## 2023-02-27 PROCEDURE — 36415 COLL VENOUS BLD VENIPUNCTURE: CPT | Performed by: HOSPITALIST

## 2023-02-27 PROCEDURE — 99233 SBSQ HOSP IP/OBS HIGH 50: CPT | Mod: ,,, | Performed by: INTERNAL MEDICINE

## 2023-02-27 PROCEDURE — 94660 CPAP INITIATION&MGMT: CPT

## 2023-02-27 PROCEDURE — 27000221 HC OXYGEN, UP TO 24 HOURS

## 2023-02-27 PROCEDURE — 99291 PR CRITICAL CARE, E/M 30-74 MINUTES: ICD-10-PCS | Mod: ,,, | Performed by: STUDENT IN AN ORGANIZED HEALTH CARE EDUCATION/TRAINING PROGRAM

## 2023-02-27 PROCEDURE — 99233 PR SUBSEQUENT HOSPITAL CARE,LEVL III: ICD-10-PCS | Mod: ,,, | Performed by: INTERNAL MEDICINE

## 2023-02-27 PROCEDURE — 25000242 PHARM REV CODE 250 ALT 637 W/ HCPCS: Performed by: INTERNAL MEDICINE

## 2023-02-27 PROCEDURE — 36430 TRANSFUSION BLD/BLD COMPNT: CPT

## 2023-02-27 PROCEDURE — 63600175 PHARM REV CODE 636 W HCPCS: Performed by: INTERNAL MEDICINE

## 2023-02-27 PROCEDURE — P9016 RBC LEUKOCYTES REDUCED: HCPCS | Performed by: INTERNAL MEDICINE

## 2023-02-27 PROCEDURE — 85025 COMPLETE CBC W/AUTO DIFF WBC: CPT | Performed by: INTERNAL MEDICINE

## 2023-02-27 PROCEDURE — 0241U SARS-COV2 (COVID) WITH FLU/RSV BY PCR: CPT | Performed by: INTERNAL MEDICINE

## 2023-02-27 PROCEDURE — 94761 N-INVAS EAR/PLS OXIMETRY MLT: CPT

## 2023-02-27 PROCEDURE — 27000190 HC CPAP FULL FACE MASK W/VALVE

## 2023-02-27 PROCEDURE — 94799 UNLISTED PULMONARY SVC/PX: CPT

## 2023-02-27 PROCEDURE — 83880 ASSAY OF NATRIURETIC PEPTIDE: CPT | Performed by: INTERNAL MEDICINE

## 2023-02-27 PROCEDURE — 99233 PR SUBSEQUENT HOSPITAL CARE,LEVL III: ICD-10-PCS | Mod: ,,, | Performed by: STUDENT IN AN ORGANIZED HEALTH CARE EDUCATION/TRAINING PROGRAM

## 2023-02-27 PROCEDURE — 84145 PROCALCITONIN (PCT): CPT | Performed by: INTERNAL MEDICINE

## 2023-02-27 PROCEDURE — 97535 SELF CARE MNGMENT TRAINING: CPT

## 2023-02-27 PROCEDURE — 63600175 PHARM REV CODE 636 W HCPCS: Performed by: HOSPITALIST

## 2023-02-27 PROCEDURE — 36415 COLL VENOUS BLD VENIPUNCTURE: CPT | Performed by: INTERNAL MEDICINE

## 2023-02-27 PROCEDURE — 86900 BLOOD TYPING SEROLOGIC ABO: CPT | Performed by: INTERNAL MEDICINE

## 2023-02-27 PROCEDURE — 94640 AIRWAY INHALATION TREATMENT: CPT

## 2023-02-27 PROCEDURE — 97112 NEUROMUSCULAR REEDUCATION: CPT

## 2023-02-27 PROCEDURE — 99233 SBSQ HOSP IP/OBS HIGH 50: CPT | Mod: ,,, | Performed by: STUDENT IN AN ORGANIZED HEALTH CARE EDUCATION/TRAINING PROGRAM

## 2023-02-27 PROCEDURE — 99233 PR SUBSEQUENT HOSPITAL CARE,LEVL III: ICD-10-PCS | Mod: GC,,, | Performed by: INTERNAL MEDICINE

## 2023-02-27 PROCEDURE — 80197 ASSAY OF TACROLIMUS: CPT | Performed by: HOSPITALIST

## 2023-02-27 PROCEDURE — 80053 COMPREHEN METABOLIC PANEL: CPT | Performed by: INTERNAL MEDICINE

## 2023-02-27 PROCEDURE — 83735 ASSAY OF MAGNESIUM: CPT | Performed by: INTERNAL MEDICINE

## 2023-02-27 PROCEDURE — 86920 COMPATIBILITY TEST SPIN: CPT | Performed by: INTERNAL MEDICINE

## 2023-02-27 PROCEDURE — 85027 COMPLETE CBC AUTOMATED: CPT | Performed by: INTERNAL MEDICINE

## 2023-02-27 RX ORDER — HYDROCODONE BITARTRATE AND ACETAMINOPHEN 500; 5 MG/1; MG/1
TABLET ORAL
Status: DISCONTINUED | OUTPATIENT
Start: 2023-02-27 | End: 2023-03-04

## 2023-02-27 RX ORDER — CEPHALEXIN 500 MG/1
500 CAPSULE ORAL EVERY 8 HOURS
Status: COMPLETED | OUTPATIENT
Start: 2023-02-27 | End: 2023-02-27

## 2023-02-27 RX ORDER — FUROSEMIDE 10 MG/ML
80 INJECTION INTRAMUSCULAR; INTRAVENOUS ONCE
Status: COMPLETED | OUTPATIENT
Start: 2023-02-27 | End: 2023-02-27

## 2023-02-27 RX ORDER — GABAPENTIN 100 MG/1
100 CAPSULE ORAL NIGHTLY
Status: DISCONTINUED | OUTPATIENT
Start: 2023-02-27 | End: 2023-03-06 | Stop reason: HOSPADM

## 2023-02-27 RX ORDER — ACETAMINOPHEN 500 MG
1000 TABLET ORAL 3 TIMES DAILY
Status: DISCONTINUED | OUTPATIENT
Start: 2023-02-27 | End: 2023-03-06 | Stop reason: HOSPADM

## 2023-02-27 RX ADMIN — SODIUM BICARBONATE 1300 MG: 650 TABLET ORAL at 09:02

## 2023-02-27 RX ADMIN — TACROLIMUS 2.5 MG: 1 CAPSULE ORAL at 06:02

## 2023-02-27 RX ADMIN — HYDRALAZINE HYDROCHLORIDE 50 MG: 25 TABLET, FILM COATED ORAL at 09:02

## 2023-02-27 RX ADMIN — IPRATROPIUM BROMIDE AND ALBUTEROL SULFATE 3 ML: 2.5; .5 SOLUTION RESPIRATORY (INHALATION) at 03:02

## 2023-02-27 RX ADMIN — ACETAMINOPHEN 1000 MG: 500 TABLET ORAL at 12:02

## 2023-02-27 RX ADMIN — FAMOTIDINE 20 MG: 20 TABLET ORAL at 09:02

## 2023-02-27 RX ADMIN — SODIUM BICARBONATE 1300 MG: 650 TABLET ORAL at 06:02

## 2023-02-27 RX ADMIN — CALCIUM ACETATE 1334 MG: 667 CAPSULE ORAL at 09:02

## 2023-02-27 RX ADMIN — IPRATROPIUM BROMIDE AND ALBUTEROL SULFATE 3 ML: 2.5; .5 SOLUTION RESPIRATORY (INHALATION) at 07:02

## 2023-02-27 RX ADMIN — FLUTICASONE FUROATE AND VILANTEROL TRIFENATATE 1 PUFF: 100; 25 POWDER RESPIRATORY (INHALATION) at 08:02

## 2023-02-27 RX ADMIN — TRAMADOL HYDROCHLORIDE 25 MG: 50 TABLET, COATED ORAL at 10:02

## 2023-02-27 RX ADMIN — METOPROLOL SUCCINATE 25 MG: 25 TABLET, EXTENDED RELEASE ORAL at 09:02

## 2023-02-27 RX ADMIN — TACROLIMUS 2.5 MG: 1 CAPSULE ORAL at 09:02

## 2023-02-27 RX ADMIN — HYDRALAZINE HYDROCHLORIDE 50 MG: 25 TABLET, FILM COATED ORAL at 04:02

## 2023-02-27 RX ADMIN — COLCHICINE 0.3 MG: 0.6 TABLET, FILM COATED ORAL at 09:02

## 2023-02-27 RX ADMIN — CALCIUM ACETATE 1334 MG: 667 CAPSULE ORAL at 04:02

## 2023-02-27 RX ADMIN — FUROSEMIDE 80 MG: 10 INJECTION, SOLUTION INTRAMUSCULAR; INTRAVENOUS at 12:02

## 2023-02-27 RX ADMIN — PARICALCITOL 1 MCG: 1 CAPSULE, LIQUID FILLED ORAL at 09:02

## 2023-02-27 RX ADMIN — CEPHALEXIN 500 MG: 500 CAPSULE ORAL at 09:02

## 2023-02-27 RX ADMIN — CEPHALEXIN 500 MG: 500 CAPSULE ORAL at 06:02

## 2023-02-27 RX ADMIN — IPRATROPIUM BROMIDE AND ALBUTEROL SULFATE 3 ML: 2.5; .5 SOLUTION RESPIRATORY (INHALATION) at 11:02

## 2023-02-27 RX ADMIN — NIFEDIPINE 60 MG: 30 TABLET, FILM COATED, EXTENDED RELEASE ORAL at 09:02

## 2023-02-27 RX ADMIN — GABAPENTIN 100 MG: 100 CAPSULE ORAL at 09:02

## 2023-02-27 RX ADMIN — ASPIRIN 81 MG: 81 TABLET, COATED ORAL at 09:02

## 2023-02-27 RX ADMIN — ATORVASTATIN CALCIUM 40 MG: 20 TABLET, FILM COATED ORAL at 09:02

## 2023-02-27 RX ADMIN — DOXAZOSIN 4 MG: 2 TABLET ORAL at 09:02

## 2023-02-27 RX ADMIN — SODIUM BICARBONATE 1300 MG: 650 TABLET ORAL at 04:02

## 2023-02-27 RX ADMIN — ACETAMINOPHEN 1000 MG: 500 TABLET ORAL at 09:02

## 2023-02-27 RX ADMIN — DIVALPROEX SODIUM 500 MG: 250 TABLET, DELAYED RELEASE ORAL at 09:02

## 2023-02-27 RX ADMIN — PREDNISONE 5 MG: 5 TABLET ORAL at 09:02

## 2023-02-27 RX ADMIN — IPRATROPIUM BROMIDE AND ALBUTEROL SULFATE 3 ML: 2.5; .5 SOLUTION RESPIRATORY (INHALATION) at 08:02

## 2023-02-27 RX ADMIN — CALCIUM ACETATE 1334 MG: 667 CAPSULE ORAL at 12:02

## 2023-02-27 RX ADMIN — DIVALPROEX SODIUM 250 MG: 250 TABLET, DELAYED RELEASE ORAL at 06:02

## 2023-02-27 RX ADMIN — FUROSEMIDE 10 MG/HR: 10 INJECTION, SOLUTION INTRAMUSCULAR; INTRAVENOUS at 04:02

## 2023-02-27 RX ADMIN — CEPHALEXIN 500 MG: 500 CAPSULE ORAL at 04:02

## 2023-02-27 NOTE — AI DETERIORATION ALERT
Evaluation of Early Detection of Sepsis Risk     Patient Name: Ibrahima Phelps  MRN:  [id number]  Primary Care Team: [facility]  Date of Admission:  2/20/2023     Principal Problem:  Acute kidney injury superimposed on CKD   Date of Review: 02/27/2023    Patient reviewed for elevated sepsis risk score. Sepsis Screen Negative  Will continue to monitor for signs and symptoms of new or worsening infection and screen as needed. Please notify Rapid Response Team for any hypotension or decompensation.    Sepsis Screen Results     IP Sepsis Screen (most recent)       Sepsis Screen (IP) - 02/27/23 1430       Is the patient's history or complaint suggestive of a possible infection? Yes  -SS    Are there at least two of the following signs and symptoms present? No  -SS    Are any of the following organ dysfunction criteria present and not considered to be due to a chronic condition? Yes  -SS    Organ Dysfunction Criteria Creatinine > 2.0  -SS    Initiate Sepsis Protocol No  -SS              User Key  (r) = Recorded By, (t) = Taken By, (c) = Cosigned By      Initials Name    SS Stephen Saenz, PA-C Stephen Saenz, PA-C [facility]

## 2023-02-27 NOTE — SIGNIFICANT EVENT
[facility] Medicine Rapid Response Note      Admit Date: 2/20/2023  LOS: 7  Code Status: Prior   CC: Acute kidney injury superimposed on CKD  Date of Consult: 02/27/2023  RRT Indication(s): Respiratory Distress  Attending Physician: Morris Payan MD  Primary Service: [facility]    SUBJECTIVE:     Interval History: Notified by bedside RN that patient had become acutely dyspneic with increased respiratory rate. SpO2 briefly noted to be in 60s with improvement to 80s after breathing treatment.    Upon arrival, patient found to be tachypneic to the 30s with shallow breathing.      OBJECTIVE:     Vital Signs (Most Recent):  Temp: 98.1 °F (36.7 °C) (02/26/23 2312)  Pulse: 79 (02/27/23 0100)  Resp: (!) 28 (02/27/23 0100)  BP: (!) 148/67 (02/26/23 2312)  SpO2: 97 % (02/27/23 0100)   Vital Signs (24h Range):  Temp:  [97.4 °F (36.3 °C)-98.6 °F (37 °C)] 98.1 °F (36.7 °C)  Pulse:  [70-90] 79  Resp:  [16-28] 28  SpO2:  [80 %-99 %] 97 %  BP: (137-176)/(63-79) 148/67     I & O (24h):    Intake/Output Summary (Last 24 hours) at 2/27/2023 0242  Last data filed at 2/26/2023 1834  Gross per 24 hour   Intake 720 ml   Output 400 ml   Net 320 ml        Physical Exam: Physical Exam   Constitutional: He appears distressed.   Patient found to be in respiratory distress as he was tachypneic with increased respiratory rate. Unable to speak in full sentences given tachypnea. Cooperative with examination. He appears ill.   HENT:   Head: Normocephalic and atraumatic.   Right Ear: External ear normal.   Left Ear: External ear normal.   Nose: Nose normal.   Mouth/Throat: Mucous membranes are moist. No oropharyngeal exudate or posterior oropharyngeal erythema. Oropharynx is clear.   Eyes: Right eye exhibits no discharge. Left eye exhibits no discharge. No scleral icterus.   Cardiovascular: Regular rhythm and normal pulses. Tachycardia present. Exam reveals no gallop and no friction rub.   No murmur heard.Pulmonary:      Effort: Respiratory  "distress present.      Breath sounds: No stridor. Wheezing, rhonchi and rales present.      Comments: Anterior breath sounds coarse bilaterally.  Chest:      Chest wall: No tenderness.     Abdominal: There is no abdominal tenderness.   Genitourinary:    Genitourinary Comments: Hamilton catheter noted with no urine in catheter bag.     Musculoskeletal:         General: Normal range of motion.      Cervical back: Neck supple.      Right lower leg: No edema.      Left lower leg: No edema.   Neurological: He is alert.   Skin: He is diaphoretic.   Nursing note and vitals reviewed.    Lines/Drains/Airway:          Urethral Catheter 02/24/23 1915 Latex 12 Fr. (Active)   Site Assessment Clean;Intact 02/26/23 1047   Collection Container Standard drainage bag 02/26/23 1047   Securement Method secured to top of thigh w/ adhesive device 02/26/23 1047   Catheter Care Performed yes 02/26/23 1047   Reason for Continuing Urinary Catheterization Urinary retention 02/26/23 1047   CAUTI Prevention Bundle Securement Device in place with 1" slack;Intact seal between catheter & drainage tubing;Drainage bag/urimeter off the floor;Sheeting clip in use;No dependent loops or kinks;Drainage bag/urimeter not overfilled (<2/3 full);Drainage bag/urimeter below bladder 02/26/23 1047   Output (mL) 400 mL 02/26/23 1655        Nutrition:  Current Diet Order   Procedures    Diet renal         Labs/Imaging- All pertinent labs within the past 24 hours have been reviewed.  ABGs:   Recent Labs   Lab 02/27/23  0023   PH 7.490*   PCO2 31.1*   HCO3 23.7*   POCSATURATED 84*   BE 0   PO2 44*     CBC:   Recent Labs   Lab 02/25/23  1402 02/26/23  0532 02/27/23  0023 02/27/23  0051   WBC 8.11 8.01  --  10.80   HGB 7.0* 7.3*  --  6.8*   HCT 23.1* 25.3* 23* 23.3*    232  --  223     CMP:   Recent Labs   Lab 02/25/23  1402 02/26/23  0532 02/27/23  0051   * 135* 136   K 4.8 4.1 4.0    104 102   CO2 21* 18* 22*   * 80 115*   BUN 46* 48* 50* "   CREATININE 3.6* 3.7* 3.9*   CALCIUM 7.3* 7.3* 7.6*   PROT  --   --  7.3   ALBUMIN  --   --  2.2*   BILITOT  --   --  0.4   ALKPHOS  --   --  78   AST  --   --  25   ALT  --   --  20   ANIONGAP 11 13 12       Diagnostics/Interventions during Rapid response     ABG, CXR  CBC, CMP, Mg, Phos, BNP  IV furosemide 80 mg x1  BiPAP    ASSESSMENT/PLAN:     Active Hospital Problems    Diagnosis    *Acute kidney injury superimposed on CKD    Hypoxia    Right ankle pain    Wheezing    Anemia of renal disease    Chronic dyspnea    Cellulitis of foot    Hypocalcemia    Anemia due to stage 4 chronic kidney disease    Acute on chronic diastolic heart failure     TTE (2/9/23):  The left ventricle is normal in size with normal systolic function. The estimated ejection fraction is 65%.  Normal right ventricular size with normal right ventricular systolic function.  Grade II left ventricular diastolic dysfunction.  Severe left atrial enlargement.  There is mild aortic valve stenosis. Aortic valve area is 1.8 cm2; peak velocity is 2.9 m/s; mean gradient is 15 mmHg.  Pulmonary HTN - the estimated PA systolic pressure is > 41mm Hg. (The IVC was not well visualized.)          Paroxysmal atrial fibrillation    H/O kidney transplant    Hypertension    CAD (coronary artery disease)     Negative stress echo 2009  ? Hx of pericarditis      Long-term use of immunosuppressant medication          Acute respiratory distress thought to be secondary to volume overload. BNP elevated >700 with chest radiograph notable for worsening bilateral edema (right>left) on my read. Patient with coarse breath sounds bilaterally. PaO2 44 on ABG with patient on 5 liters of oxygen via nasal cannula.    IV furosemide 80 mg given for additional diuresis. Patient placed on BiPAP for support and found to be more comfortable.    Will plan for repeat ABG and chest radiograph at 3 AM to re-evaluate respiratory status. If little additional output with IV furosemide, can  transition to furosemide gtt.    Chilo Koroma MD  Department of Hospital Medicine    Critical Care Time: 40 minutes    84728 (first 30-74 minutes) [id number] (each additional 30 minutes)      Critical care was time spent personally by me on the following activities: evaluating this patient's organ dysfunction, development of treatment plan, discussing treatment plan with patient or surrogate and bedside caregivers, discussions with consultants, evaluation of patient's response to treatment, examination of patient, ordering and performing treatments and interventions, ordering and review of laboratory studies, ordering and review of radiographic studies, re-evaluation of patient's condition. This critical care time did not overlap with that of any other provider or involve time for any separately billed procedures    Diagnosis requiring critical care: Patient has a condition that poses threat to life and bodily function: Severe Respiratory Distress

## 2023-02-27 NOTE — NURSING
Wife at bedside with patient notified bedside nurse, Viola of increased anxiety in patient. Bedside Nurse Viola, noted increased anxiety in patient and decreased O2 sats, SOB, and tachypnea. Patient was previously given Klonopin during med pass for anxiety. HOB raised to 45 degrees.  Nurse notified Dr. Koroma, Respiratory, and OC Laney of change in patient's condition. Patient O2 continued to decrease to 66% on 2LPM. HOB increased to 50 and O2 raised to 3LPM. O2 sats increased to 90%. Pt still appeared uncomfortable, Rapid Response team contacted. RRT and Dr. Ovalles responded to bedside. Lasix 80mg administered at bedside, ABG drawn, C-Xray ordered stat, and Pt started on Bipap. Pt appears comfortable and is resting. Wife remains at bedside. Will update MD on output throughout night.

## 2023-02-27 NOTE — PROGRESS NOTES
[facility] - Intensive Care ([facility]-16)  Kidney Transplant  Progress Note      Reason for Follow-up: Reassessment of Kidney Transplant - 4/12/2005 - Not Followed  (#2) recipient and management of immunosuppression.    ORGAN: LEFT KIDNEY    Donor Type:           Subjective:   History of Present Illness:  67 year old man with past medical history of kidney transplant times 2 first one in 1992 and then again in 2005. Has CKD with baseline creatinine high 2s. Recently admitted earlier this month with COVID. Was admitted with KATINA with cr 3.8 on admit. Last admission when he was admitted with COVID his cr did peak at 3.8 but when he was on 2/10 his cr was 2.6 at baseline. Patient denies any diarrhea after discharge from hospital. Says that he has a hard time urinating but no dysuria, no hematuria , no flank pain , no fevers. Was on keflex for mild cellulitis. Denies NSAID use. This morning patient was wheezing and had some SOB. He was started on IVF NS with no improvement in scr this morning was 3.8    Mr. Phelps is a 67 y.o. year old male who is status post Kidney Transplant - 4/12/2005 - Not Followed  (#2).    His maintenance immunosuppression consists of:   Immunosuppressants (From admission, onward)      Start     Stop Route Frequency Ordered    02/25/23 1800  tacrolimus capsule 2.5 mg         -- Oral 2 times daily 02/25/23 1017            Hospital Course:  No notes on file    Interval History:  Kidney function unchanged during hospital stay. Patient confused this morning. Complaining of right ankle pain.    Past Medical, Surgical, Family, and Social History:   Unchanged from H&P.    Scheduled Meds:   acetaminophen  1,000 mg Oral TID    albuterol-ipratropium  3 mL Nebulization Q4H WAKE    aspirin  81 mg Oral Daily    atorvastatin  40 mg Oral QHS    calcium acetate(phosphat bind)  1,334 mg Oral TID WM    cephALEXin  500 mg Oral Q8H    colchicine  0.3 mg Oral Daily    divalproex  500 mg Oral QHS    And     "divalproex  250 mg Oral QAM    doxazosin  4 mg Oral Q12H    epoetin samara (PROCRIT) injection  20,000 Units Subcutaneous Q7 Days    famotidine  20 mg Oral Daily    fluticasone furoate-vilanteroL  1 puff Inhalation Daily    gabapentin  100 mg Oral QHS    hydrALAZINE  50 mg Oral TID    metoprolol succinate  25 mg Oral Daily    NIFEdipine  60 mg Oral BID    paricalcitoL  1 mcg Oral Daily    predniSONE  5 mg Oral Daily    sodium bicarbonate  1,300 mg Oral Q8H    tacrolimus  2.5 mg Oral BID     Continuous Infusions:   furosemide (LASIX) 10 mg/mL infusion (non-titrating) 10 mg/hr (02/27/23 0416)     PRN Meds:sodium chloride, sodium chloride, albuterol, albuterol-ipratropium, clonazePAM, melatonin, traMADoL    Intake/Output - Last 3 Shifts         02/25 0700  02/26 0659 02/26 0700 02/27 0659 02/27 0700  02/28 0659    P.O. 720 720 200    Total Intake(mL/kg) 720 (7.1) 720 (6.6) 200 (1.8)    Urine (mL/kg/hr)  800 (0.3) 400 (0.4)    Total Output  800 400    Net +720 -80 -200           Stool Occurrence 0 x 0 x              Review of Systems   Unable to perform ROS: Mental status change    Objective:     Vital Signs (Most Recent):  Temp: 98.7 °F (37.1 °C) (02/27/23 1330)  Pulse: 74 (02/27/23 1526)  Resp: 18 (02/27/23 1526)  BP: (!) 156/74 (02/27/23 1330)  SpO2: 96 % (02/27/23 1527)   Vital Signs (24h Range):  Temp:  [97 °F (36.1 °C)-98.7 °F (37.1 °C)] 98.7 °F (37.1 °C)  Pulse:  [61-90] 74  Resp:  [18-28] 18  SpO2:  [80 %-100 %] 96 %  BP: (134-159)/(67-74) 156/74     Weight: 108.4 kg (238 lb 15.7 oz)  Height: 6' 1" (185.4 cm)  Body mass index is 31.53 kg/m².    Physical Exam  Vitals and nursing note reviewed.   Constitutional:       General: He is not in acute distress.     Appearance: He is obese. He is ill-appearing.   Eyes:      General: No scleral icterus.  Cardiovascular:      Rate and Rhythm: Normal rate.   Pulmonary:      Effort: Pulmonary effort is normal. No respiratory distress.   Abdominal:      General: " There is no distension.      Palpations: Abdomen is soft.   Musculoskeletal:      Right lower leg: No edema.      Left lower leg: No edema.   Skin:     Coloration: Skin is not jaundiced.   Neurological:      Mental Status: He is alert.       Laboratory:  Labs within the past 24 hours have been reviewed.    Diagnostic Results:  Chest X-Ray: No results found. However, due to the size of the patient record, not all encounters were searched. Please check Results Review for a complete set of results.    Assessment/Plan:     * Acute kidney injury superimposed on CKD  See kidney transplant       Anemia of renal disease  Patient denies any bleeding no melena nor bleeding per rectum   Being transfused today per primary team   Management per primary team       Wheezing  CT chest with pulmonary edema and possible pneumonia  Management per primary    Cellulitis of foot  On keflex per primary team       Hypocalcemia  Improved   Replete calcium per primary       H/O kidney transplant  trasnsplant kidney times 2 first one was in 1992 and second one was 2005 patient on prograf 2 mg BID and prednisone 5 mg po qday   KATINA on CKD   baseline cr is high 2.6-2.8.   Non oliguric   UA bland   Transplant renal US no hydro   Patient having difficult time urinating   No improvement with IVF cr same today at 3.8   Bicarb 16 c/w acidosis in the setting of KATINA   Euvolemic on exam   Can not rule out underlying rejection and DSA pending, but patient unlikely to benefit from biopsy at this time  Ok to continue diuresis for now, consider echo for cvp eval    Long-term use of immunosuppressant medication  On FK 2.5 mg bid.  On prednisone 5 mg po qday   Monitor for toxicity        SUKHI Diaz Jr., MD  Kidney Transplant  [facility] - Intensive Care ([facility]-)

## 2023-02-27 NOTE — PT/OT/SLP EVAL
Occupational Therapy   Evaluation    Name: Ibrahima Phelps  MRN: [id number]  Admitting Diagnosis: Acute kidney injury superimposed on CKD  Recent Surgery: * No surgery found *      Recommendations:     Discharge Recommendations: nursing facility, skilled  Discharge Equipment Recommendations:  walker, rolling, bedside commode  Barriers to discharge:  None    Assessment:     Ibrahima Phelps is a 67 y.o. male with a medical diagnosis of Acute kidney injury superimposed on CKD.  He presents with decreased speech production 2/2 previous COVID diagnosis (1 week ago). Pt managed bed mobility with increased (A) to prevent pain exacerbation (RLE). Pt attempted STS x 2 trials with full stand archived on second trial 2/2 RLE pain (gout). Pt brushed teeth / donned socks at Eob with CGA. Performance deficits affecting function: weakness, impaired endurance, impaired self care skills, impaired functional mobility, gait instability, decreased lower extremity function, pain.      Rehab Prognosis: Good; patient would benefit from acute skilled OT services to address these deficits and reach maximum level of function.       Plan:     Patient to be seen 3 x/week to address the above listed problems via self-care/home management, neuromuscular re-education, therapeutic activities, therapeutic exercises  Plan of Care Expires: 03/26/23  Plan of Care Reviewed with: patient, spouse    Subjective     Chief Complaint: Pt c/o immense RLE pain  Patient/Family Comments/goals: None    Occupational Profile:  Living Environment: Patient lives with spouse in [facility] with 1 LOBO, tub/shower combo.   Previous level of function: patients level of function was independent, denies falls in past 3mo.   Roles and Routines:   Equipment Used at Home: none  Assistance upon Discharge: Family    Pain/Comfort:  Pain Rating 1: 7/10  Location - Side 1: Right  Location - Orientation 1: lower  Location 1: leg  Pain Addressed 1: Reposition, Distraction, Nurse  notified    Patients cultural, spiritual, [employer]alism conflicts given the current situation: no    Objective:     Communicated with: nurse prior to session.  Patient found HOB elevated with telemetry, oxygen, pearson catheter, peripheral IV upon OT entry to room.    General Precautions: Standard, fall  Orthopedic Precautions: N/A  Braces: N/A  Respiratory Status: Nasal cannula, flow 3 L/min    Occupational Performance:    Bed Mobility:    Patient completed Rolling/Turning to Left with  minimum assistance  Patient completed Rolling/Turning to Right with minimum assistance  Patient completed Supine to Sit with maximal assistance    Functional Mobility/Transfers:  Patient completed Sit <> Stand Transfer with maximal assistance and Contact guard  with  rolling walker   Functional Mobility: Pt required Max (A) with therapist (A), unable to stand 2/2 pain. Pt attempted standing without (A) and archived full upright posture.     Activities of Daily Living:  Grooming: contact guard assistance Brushed teeth at EOb  Lower Body Dressing: contact guard assistance Donned socks at EOB    Cognitive/Visual Perceptual:  Cognitive/Psychosocial Skills:     -       Oriented to: Person, Place, Time, and Situation   -       Follows Commands/attention:Follows multistep  commands  -       Safety awareness/insight to disability: intact   -       Mood/Affect/Coping skills/emotional control: Happy    Physical Exam:  Balance:    -       Good overall balance impacted by RLE pain  Postural examination/scapula alignment:    -       No postural abnormalities identified  Edema:  Mild RLE  Sensation:    -       Intact  Upper Extremity Range of Motion:     -       Right Upper Extremity: WFL  -       Left Upper Extremity: WFL  Upper Extremity Strength:    -       Right Upper Extremity: WFL  -       Left Upper Extremity: WFL   Strength:    -       Right Upper Extremity: WFL  -       Left Upper Extremity: WFL    AMPAC 6 Click ADL:  AMPAC Total Score:  19    Treatment & Education:  Pt educated on role of OT/POC  Pt. Educated on safety precautions   Pt provided self-care/ADL re-education  Pt reviewed bed mobility/functional mobility    Patient left HOB elevated with all lines intact, call button in reach, and nurse notified    GOALS:   Multidisciplinary Problems       Occupational Therapy Goals          Problem: Occupational Therapy    Goal Priority Disciplines Outcome Interventions   Occupational Therapy Goal     OT, PT/OT Ongoing, Progressing    Description: Goals to be met by: 03/13     Patient will increase functional independence with ADLs by performing:    UE Dressing with Stand-by Assistance.  LE Dressing with Minimal Assistance.  Grooming while standing at sink with Contact Guard Assistance.  Toilet transfer to toilet with Contact Guard Assistance.                         History:     Past Medical History:   Diagnosis Date    (HFpEF) heart failure with preserved ejection fraction 9/25/2017    Atrial fibrillation     CAD (coronary artery disease)     CHF (congestive heart failure)     Chronic diastolic heart failure 4/8/2020    CKD (chronic kidney disease) stage 3, GFR 30-59 ml/min     Dr. Latif    CKD (chronic kidney disease) stage 3, GFR 30-59 ml/min     Diverticulosis     Fever blister     Heart attack 2006    Hyperlipidemia     Hypertension     Immunodeficiency due to treatment with immunosuppressive medication     Keloid cicatrix     Left lower quadrant abdominal pain 6/5/2022    Metabolic bone disease     Pericarditis     S/P kidney transplant     Supraventricular tachycardia 06/2019    Thrombocytopenia     Thyroid disease     Tobacco use 9/5/2014    Unspecified disorder of kidney and ureter     Stage IIICKD         Past Surgical History:   Procedure Laterality Date    CARDIOVERSION  04/30/15    CHOLECYSTECTOMY      COLONOSCOPY  April 20, 2011    Diverticulosis, repeat recommended in 3 yrs., repeat colonoscopy 2014 revealed 2 polyps.  He should  "return in 5 years.    COLONOSCOPY N/A 3/13/2020    Procedure: COLONOSCOPY;  Surgeon: Chon Casper MD;  Location: [facility] (4TH FLR);  Service: Endoscopy;  Laterality: N/A;  ok to hold coumadin x5days-see telephone encounter 2/4/20-tb    COLONOSCOPY N/A 2/4/2021    Procedure: COLONOSCOPY;  Surgeon: Chon Casper MD;  Location: [facility] (4TH FLR);  Service: Endoscopy;  Laterality: N/A;  Eliquis - per Dr. GAVIN Blunt ok to hold x 2 days and "restarted asap"- ERW  last prep "poor", [id number] ordered/ Pt requests Dr. Casper  prep ins. mailed - COVID screening on 2/1/21 PCW- ERW    COLONOSCOPY N/A 3/3/2021    Procedure: COLONOSCOPY;  Surgeon: Chon Casper MD;  Location: [facility] (4TH FLR);  Service: Endoscopy;  Laterality: N/A;  Patient with his second poor bowel pre and has poor prep today.  If patient not intersted or can't get colonoscopy tomorrow will need constipation bowel prep and will need to restart his Eliquis today.Thanks,Chon     per Dr Blunt-ok to hold Eliquis 2 days prior      COVID     CORONARY ANGIOPLASTY WITH STENT PLACEMENT  9/13/2006    KIDNEY TRANSPLANT  2005    PARATHYROIDECTOMY      TREATMENT OF CARDIAC ARRHYTHMIA N/A 3/28/2022    Procedure: CARDIOVERSION;  Surgeon: Migue Blunt MD;  Location: [facility] EP LAB;  Service: Cardiology;  Laterality: N/A;  AF, DCC, MAC, GP, 3 PREP*RODRIGO deferred pt 100% compliant*       Time Tracking:     OT Date of Treatment: 02/27/23  OT Start Time: 1006  OT Stop Time: 1038  OT Total Time (min): 32 min    Billable Minutes:Evaluation 8  Self Care/Home Management 24    2/27/2023  "

## 2023-02-27 NOTE — SUBJECTIVE & OBJECTIVE
Interval History: overnight pt with episode of worsening hypoxia that was attributed to hypervolemia. S/p IV lasix. Placed on bipap at night and now on 2L NC.out 800 cc overnight. Hgb 6.6 this AM.   In NSR with no ectopy.      Review of Systems   Constitutional: Negative for fever and malaise/fatigue.   Eyes:  Negative for blurred vision and pain.   Cardiovascular:  Negative for chest pain, dyspnea on exertion, leg swelling, orthopnea, palpitations and paroxysmal nocturnal dyspnea.   Respiratory:  Negative for cough, shortness of breath, sputum production and wheezing.    Hematologic/Lymphatic: Negative for adenopathy and bleeding problem.   Skin:  Negative for rash.   Musculoskeletal:  Negative for back pain and neck pain.   Gastrointestinal:  Negative for abdominal pain, constipation, diarrhea, nausea and vomiting.   Genitourinary:  Negative for dysuria.   Neurological:  Negative for dizziness, headaches, light-headedness and weakness.   Objective:     Vital Signs (Most Recent):  Temp: 97.6 °F (36.4 °C) (02/27/23 0456)  Pulse: 85 (02/27/23 0722)  Resp: 18 (02/27/23 0456)  BP: (!) 148/67 (02/26/23 2312)  SpO2: (!) 93 % (02/27/23 0722)   Vital Signs (24h Range):  Temp:  [97.4 °F (36.3 °C)-98.5 °F (36.9 °C)] 97.6 °F (36.4 °C)  Pulse:  [63-90] 85  Resp:  [16-28] 18  SpO2:  [80 %-99 %] 93 %  BP: (148-176)/(67-79) 148/67       Weight: 108.4 kg (238 lb 15.7 oz)  Body mass index is 31.53 kg/m².    SpO2: (!) 93 %       Physical Exam  Constitutional:       General: He is not in acute distress.     Appearance: Normal appearance. He is not ill-appearing, toxic-appearing or diaphoretic.   HENT:      Head: Normocephalic and atraumatic.      Nose: Nose normal.   Eyes:      Extraocular Movements: Extraocular movements intact.      Pupils: Pupils are equal, round, and reactive to light.   Cardiovascular:      Rate and Rhythm: Normal rate and regular rhythm.      Heart sounds: No murmur heard.    No friction rub. No gallop.    Pulmonary:      Effort: Pulmonary effort is normal. No respiratory distress.      Breath sounds: Normal breath sounds. No wheezing or rales.   Abdominal:      General: Abdomen is flat. There is no distension.      Palpations: Abdomen is soft. There is no mass.      Tenderness: There is no abdominal tenderness.   Musculoskeletal:         General: No swelling. Normal range of motion.      Cervical back: Normal range of motion. No rigidity.      Right lower leg: No edema.      Left lower leg: No edema.   Skin:     General: Skin is warm and dry.      Coloration: Skin is not jaundiced.      Findings: No bruising.   Neurological:      General: No focal deficit present.      Mental Status: He is alert and oriented to person, place, and time.      Cranial Nerves: No cranial nerve deficit.      Motor: No weakness.       Significant Labs: BMP:   Recent Labs   Lab 02/26/23  0532 02/27/23  0051 02/27/23 0246   GLU 80 115* 89   * 136 138   K 4.1 4.0 4.7    102 104   CO2 18* 22* 22*   BUN 48* 50* 46*   CREATININE 3.7* 3.9* 3.8*   CALCIUM 7.3* 7.6* 7.7*   MG 2.3 2.3 2.5      and CBC:   Recent Labs   Lab 02/26/23  0532 02/27/23  0023 02/27/23  0051 02/27/23  0246 02/27/23  0549 02/27/23  0623   WBC 8.01  --  10.80 10.35  --   --    HGB 7.3*  --  6.8* 6.6*  --   --    HCT 25.3*   < > 23.3* 23.1*   < > 22*     --  223 230  --   --     < > = values in this interval not displayed.         Significant Imaging: Reviewed

## 2023-02-27 NOTE — SUBJECTIVE & OBJECTIVE
Subjective:   History of Present Illness:  67 year old man with past medical history of kidney transplant times 2 first one in 1992 and then again in 2005. Has CKD with baseline creatinine high 2s. Recently admitted earlier this month with COVID. Was admitted with KATINA with cr 3.8 on admit. Last admission when he was admitted with COVID his cr did peak at 3.8 but when he was on 2/10 his cr was 2.6 at baseline. Patient denies any diarrhea after discharge from hospital. Says that he has a hard time urinating but no dysuria, no hematuria , no flank pain , no fevers. Was on keflex for mild cellulitis. Denies NSAID use. This morning patient was wheezing and had some SOB. He was started on IVF NS with no improvement in scr this morning was 3.8    Mr. Phelps is a 67 y.o. year old male who is status post Kidney Transplant - 4/12/2005 - Not Followed  (#2).    His maintenance immunosuppression consists of:   Immunosuppressants (From admission, onward)      Start     Stop Route Frequency Ordered    02/25/23 1800  tacrolimus capsule 2.5 mg         -- Oral 2 times daily 02/25/23 1017            Hospital Course:  No notes on file    Interval History:  Kidney function unchanged during hospital stay. Patient confused this morning. Complaining of right ankle pain.    Past Medical, Surgical, Family, and Social History:   Unchanged from H&P.    Scheduled Meds:   acetaminophen  1,000 mg Oral TID    albuterol-ipratropium  3 mL Nebulization Q4H WAKE    aspirin  81 mg Oral Daily    atorvastatin  40 mg Oral QHS    calcium acetate(phosphat bind)  1,334 mg Oral TID WM    cephALEXin  500 mg Oral Q8H    colchicine  0.3 mg Oral Daily    divalproex  500 mg Oral QHS    And    divalproex  250 mg Oral QAM    doxazosin  4 mg Oral Q12H    epoetin samara (PROCRIT) injection  20,000 Units Subcutaneous Q7 Days    famotidine  20 mg Oral Daily    fluticasone furoate-vilanteroL  1 puff Inhalation Daily    gabapentin  100 mg Oral QHS    hydrALAZINE  50 mg  "Oral TID    metoprolol succinate  25 mg Oral Daily    NIFEdipine  60 mg Oral BID    paricalcitoL  1 mcg Oral Daily    predniSONE  5 mg Oral Daily    sodium bicarbonate  1,300 mg Oral Q8H    tacrolimus  2.5 mg Oral BID     Continuous Infusions:   furosemide (LASIX) 10 mg/mL infusion (non-titrating) 10 mg/hr (02/27/23 0416)     PRN Meds:sodium chloride, sodium chloride, albuterol, albuterol-ipratropium, clonazePAM, melatonin, traMADoL    Intake/Output - Last 3 Shifts         02/25 0700 02/26 0659 02/26 0700 02/27 0659 02/27 0700 02/28 0659    P.O. 720 720 200    Total Intake(mL/kg) 720 (7.1) 720 (6.6) 200 (1.8)    Urine (mL/kg/hr)  800 (0.3) 400 (0.4)    Total Output  800 400    Net +720 -80 -200           Stool Occurrence 0 x 0 x              Review of Systems   Unable to perform ROS: Mental status change    Objective:     Vital Signs (Most Recent):  Temp: 98.7 °F (37.1 °C) (02/27/23 1330)  Pulse: 74 (02/27/23 1526)  Resp: 18 (02/27/23 1526)  BP: (!) 156/74 (02/27/23 1330)  SpO2: 96 % (02/27/23 1527)   Vital Signs (24h Range):  Temp:  [97 °F (36.1 °C)-98.7 °F (37.1 °C)] 98.7 °F (37.1 °C)  Pulse:  [61-90] 74  Resp:  [18-28] 18  SpO2:  [80 %-100 %] 96 %  BP: (134-159)/(67-74) 156/74     Weight: 108.4 kg (238 lb 15.7 oz)  Height: 6' 1" (185.4 cm)  Body mass index is 31.53 kg/m².    Physical Exam  Vitals and nursing note reviewed.   Constitutional:       General: He is not in acute distress.     Appearance: He is obese. He is ill-appearing.   Eyes:      General: No scleral icterus.  Cardiovascular:      Rate and Rhythm: Normal rate.   Pulmonary:      Effort: Pulmonary effort is normal. No respiratory distress.   Abdominal:      General: There is no distension.      Palpations: Abdomen is soft.   Musculoskeletal:      Right lower leg: No edema.      Left lower leg: No edema.   Skin:     Coloration: Skin is not jaundiced.   Neurological:      Mental Status: He is alert.       Laboratory:  Labs within the past 24 hours " have been reviewed.    Diagnostic Results:  Chest X-Ray: No results found. However, due to the size of the patient record, not all encounters were searched. Please check Results Review for a complete set of results.

## 2023-02-27 NOTE — ASSESSMENT & PLAN NOTE
Tele reviewed and pt noted to be in NSR with HR in 70s     -Given kidney function, continue to hold flecainide   -[id number] is 4 (HFpEF, HTN, aortic atherosclerosis, male gender, age 65-74), thus would continue Eliquis at this time   - EP will sign off  Please do not hesitate to reach back with any questions or concerns.

## 2023-02-27 NOTE — PLAN OF CARE
Problem: Occupational Therapy  Goal: Occupational Therapy Goal  Description: Goals to be met by: 03/13     Patient will increase functional independence with ADLs by performing:    UE Dressing with Stand-by Assistance.  LE Dressing with Minimal Assistance.  Grooming while standing at sink with Contact Guard Assistance.  Toilet transfer to toilet with Contact Guard Assistance.    Outcome: Ongoing, Progressing

## 2023-02-27 NOTE — CODE/ RAPID DOCUMENTATION
RAPID RESPONSE NURSE NOTE        Admit Date: 2023  LOS: 7  Code Status: Prior   Date of Consult: 2023  : 1955  Age: 67 y.o.  Weight:   Wt Readings from Last 1 Encounters:   23 101.6 kg (224 lb)     Sex: male  Race: Black or    Bed: 24369/82947 A:   MRN: [id number]  Time Rapid Response Team page Received: 0024  Time Rapid Response Team at Bedside: 0030  Time Rapid Response Team left Bedside: 0055  Was the patient discharged from an ICU this admission? No   Was the patient discharged from a PACU within last 24 hours? No   Did the patient receive conscious sedation/general anesthesia in last 24 hours? No  Was the patient in the ED within the past 24 hours? No  Was the patient on NIPPV within the past 24 hours? No   Did this progress into an ARC or CPA: no  Attending Physician: Morris Payan MD  Primary Service: [facility] MED R       SITUATION    Notified by charge RN via phone call.  Reason for alert: Hypoxia  Called to evaluate the patient for Respiratory    BACKGROUND     Why is the patient in the hospital?: Acute kidney injury superimposed on CKD    Patient has a past medical history of (HFpEF) heart failure with preserved ejection fraction, Atrial fibrillation, CAD (coronary artery disease), CHF (congestive heart failure), Chronic diastolic heart failure, CKD (chronic kidney disease) stage 3, GFR 30-59 ml/min, CKD (chronic kidney disease) stage 3, GFR 30-59 ml/min, Diverticulosis, Fever blister, Heart attack, Hyperlipidemia, Hypertension, Immunodeficiency due to treatment with immunosuppressive medication, Keloid cicatrix, Left lower quadrant abdominal pain, Metabolic bone disease, Pericarditis, S/P kidney transplant, Supraventricular tachycardia, Thrombocytopenia, Thyroid disease, Tobacco use, and Unspecified disorder of kidney and ureter.    Last Vitals:  Temp: 98.1 °F (36.7 °C) (2312)  Pulse: 79 (100)  Resp: 28 (100)  BP: 148/67 (2312)  SpO2: 97  % (02/27 0100)    24 Hours Vitals Range:  Temp:  [97.4 °F (36.3 °C)-98.6 °F (37 °C)]   Pulse:  [70-90]   Resp:  [16-28]   BP: (137-176)/(63-79)   SpO2:  [80 %-99 %]     Labs:  Recent Labs     02/25/23  1402 02/26/23  0532 02/27/23  0023 02/27/23  0051   WBC 8.11 8.01  --  10.80   HGB 7.0* 7.3*  --  6.8*   HCT 23.1* 25.3* 23* 23.3*    232  --  223       Recent Labs     02/25/23  1402 02/26/23  0532 02/27/23  0051   * 135* 136   K 4.8 4.1 4.0    104 102   CO2 21* 18* 22*   CREATININE 3.6* 3.7* 3.9*   * 80 115*   PHOS 6.4* 6.2* 5.9*   MG 2.4 2.3 2.3        Recent Labs     02/27/23  0023   PH 7.490*   PCO2 31.1*   PO2 44*   HCO3 23.7*   POCSATURATED 84*   BE 0        ASSESSMENT    Physical Exam  Constitutional:       General: He is in acute distress.   HENT:      Mouth/Throat:      Mouth: Mucous membranes are dry.   Eyes:      Pupils: Pupils are equal, round, and reactive to light.   Cardiovascular:      Rate and Rhythm: Normal rate and regular rhythm.   Pulmonary:      Effort: Tachypnea and respiratory distress present.      Breath sounds: Decreased air movement present. Examination of the right-upper field reveals decreased breath sounds and rales. Examination of the left-upper field reveals decreased breath sounds. Examination of the right-middle field reveals decreased breath sounds and rales. Examination of the left-middle field reveals decreased breath sounds and rales. Examination of the right-lower field reveals decreased breath sounds and rales. Examination of the left-lower field reveals decreased breath sounds and rales. Decreased breath sounds and rales present.   Abdominal:      General: There is distension.   Skin:     General: Skin is warm and dry.   Neurological:      Mental Status: He is lethargic and disoriented.      GCS: GCS eye subscore is 4. GCS verbal subscore is 4. GCS motor subscore is 5.      Motor: Weakness present.       INTERVENTIONS    The patient was seen for  Respiratory problem. Staff concerns included tachypnea, new onset of difficulty breathing, oxygen saturation < 90% despite supplemental oxygen, increased WOB, and increased oxygen requirements. The following interventions were performed: supplemental oxygen, Bipap, POCT arterial blood gas , portable chest x-ray, continued pulse ox monitoring continued, continued cardiac monitoring continued, and 80mg IVP lasix, PIV started, STAT labs.    RECOMMENDATIONS    We recommend: QHS BIPAP w/O2 weaning as tolerated to maintain SpO2 >90%. Repeat ABG and CXR @ 0400 to assess respiratory status.     PROVIDER ESCALATION    Orders received and case discussed with Dr. Chilo Koroma MD  .    Primary team arrival time: 0030      Disposition: Remain in room 54933.    FOLLOW UP    bedside RNRenetta  updated on plan of care. Instructed to call the Rapid Response Nurse, Noe Perez RN at [phone] for additional questions or concerns.        RRN IV START       IV started during emergency event. 20g placed to left posterior hand.   Please call Rapid Response RNNoe, RN with any questions or concerns at [phone].

## 2023-02-27 NOTE — PLAN OF CARE
Problem: Physical Therapy  Goal: Physical Therapy Goal  Description: Goals to be met by: 3/20/2023      Patient will increase functional independence with mobility by performin. Supine to sit with Supervision  2. Sit to supine with Supervision  3. Sit to stand transfer with Stand-by Assistance  4. Bed to chair transfer with Stand-by Assistance using Rolling Walker  5. Gait  x 100 feet with Stand-by Assistance using Rolling Walker.   6. Ascend/Descend 6 inch curb step with Contact Guard Assistance using Rolling Walker.    Outcome: Ongoing, Progressing   Eval completed, goals appropriate at this time

## 2023-02-27 NOTE — CARE UPDATE
RAPID RESPONSE NURSE PROACTIVE ROUNDING NOTE       Time of Visit: 812    Admit Date: 2023  LOS: 7  Code Status: Prior   Date of Visit: 2023  : 1955  Age: 67 y.o.  Sex: male  Race: Black or   Bed: 27396/57869 A:   MRN: [id number]  Was the patient discharged from an ICU this admission? No   Was the patient discharged from a PACU within last 24 hours? No   Did the patient receive conscious sedation/general anesthesia in last 24 hours? No  Was the patient in the ED within the past 24 hours? No  Was the patient on NIPPV within the past 24 hours? No   Attending Physician: Morris Payan MD  Primary Service: [facility] MED    Time spent at the bedside: < 15 min    SITUATION    Notified by previous RRN during handoff.  Reason for alert: increasing oxygen requirement overnight  Called to evaluate the patient for Respiratory    BACKGROUND     Why is the patient in the hospital?: Acute kidney injury superimposed on CKD    Patient has a past medical history of (HFpEF) heart failure with preserved ejection fraction, Atrial fibrillation, CAD (coronary artery disease), CHF (congestive heart failure), Chronic diastolic heart failure, CKD (chronic kidney disease) stage 3, GFR 30-59 ml/min, CKD (chronic kidney disease) stage 3, GFR 30-59 ml/min, Diverticulosis, Fever blister, Heart attack, Hyperlipidemia, Hypertension, Immunodeficiency due to treatment with immunosuppressive medication, Keloid cicatrix, Left lower quadrant abdominal pain, Metabolic bone disease, Pericarditis, S/P kidney transplant, Supraventricular tachycardia, Thrombocytopenia, Thyroid disease, Tobacco use, and Unspecified disorder of kidney and ureter.    Last Vitals:  Temp: 97.6 °F (36.4 °C) ( 0456)  Pulse: 63 (812)  Resp: 18 (812)  BP: 136/72 (812)  SpO2: 97 % (812)    24 Hours Vitals Range:  Temp:  [97.4 °F (36.3 °C)-98.5 °F (36.9 °C)]   Pulse:  [61-90]   Resp:  [16-28]   BP: (136-176)/(67-79)    SpO2:  [80 %-100 %]     Labs:  Recent Labs     02/26/23  0532 02/27/23  0023 02/27/23  0051 02/27/23  0246 02/27/23  0549 02/27/23  0623   WBC 8.01  --  10.80 10.35  --   --    HGB 7.3*  --  6.8* 6.6*  --   --    HCT 25.3*   < > 23.3* 23.1* 20* 22*     --  223 230  --   --     < > = values in this interval not displayed.       Recent Labs     02/26/23  0532 02/27/23  0051 02/27/23 0246   * 136 138   K 4.1 4.0 4.7    102 104   CO2 18* 22* 22*   CREATININE 3.7* 3.9* 3.8*   GLU 80 115* 89   PHOS 6.2* 5.9* 6.2*   MG 2.3 2.3 2.5        Recent Labs     02/27/23  0023 02/27/23 0549 02/27/23 0623   PH 7.490* 7.483* 7.472*   PCO2 31.1* 32.5* 35.9   PO2 44* 47* 118*   HCO3 23.7* 24.4 26.2   POCSATURATED 84* 86* 99   BE 0 1 3        ASSESSMENT    Physical Exam  Vitals and nursing note reviewed.   Constitutional:       General: He is sleeping. He is not in acute distress.     Interventions: Nasal cannula in place.   Cardiovascular:      Rate and Rhythm: Normal rate.   Pulmonary:      Effort: Pulmonary effort is normal. No respiratory distress.      Comments: Seen on 5L NC, looks comfortable, SpO2 100%. Weaned to 3L NC per RT. Receiving scheduled breathing tx.   Neurological:      Mental Status: He is easily aroused.       INTERVENTIONS    The patient was seen for Respiratory problem. Staff concerns included increased WOB and increased oxygen requirements. The following interventions were performed: supplemental oxygen, continued pulse ox monitoring continued, and continued cardiac monitoring continued.    RECOMMENDATIONS    Continue monitoring per unit protocol     PROVIDER ESCALATION    Yes/No  no    Orders received and case discussed with NA.    Disposition: Remain in room 33049.    FOLLOW-UP    Charge Nicki BEVERLY  updated on plan of care. Instructed to call the Rapid Response Nurse, Nadira Devries RN at [phone] for additional questions or concerns.

## 2023-02-27 NOTE — RESPIRATORY THERAPY
RAPID RESPONSE RESPIRATORY THERAPY PROACTIVE NOTE           Time of visit: 825     Code Status: Prior   : 1955  Bed: 59534/24539 A:   MRN: [id number]  Time spent at the bedside: < 15 min    SITUATION    Evaluated patient for: Continuous BiPAP    BACKGROUND    Why is the patient in the hospital?: Acute kidney injury superimposed on CKD    Patient has a past medical history of (HFpEF) heart failure with preserved ejection fraction, Atrial fibrillation, CAD (coronary artery disease), CHF (congestive heart failure), Chronic diastolic heart failure, CKD (chronic kidney disease) stage 3, GFR 30-59 ml/min, CKD (chronic kidney disease) stage 3, GFR 30-59 ml/min, Diverticulosis, Fever blister, Heart attack, Hyperlipidemia, Hypertension, Immunodeficiency due to treatment with immunosuppressive medication, Keloid cicatrix, Left lower quadrant abdominal pain, Metabolic bone disease, Pericarditis, S/P kidney transplant, Supraventricular tachycardia, Thrombocytopenia, Thyroid disease, Tobacco use, and Unspecified disorder of kidney and ureter.    24 Hours Vitals Range:  Temp:  [97.4 °F (36.3 °C)-98.5 °F (36.9 °C)]   Pulse:  [61-90]   Resp:  [16-28]   BP: (134-176)/(67-79)   SpO2:  [80 %-100 %]     Labs:    Recent Labs     23  0532 23  0051 23  0246   * 136 138   K 4.1 4.0 4.7    102 104   CO2 18* 22* 22*   CREATININE 3.7* 3.9* 3.8*   GLU 80 115* 89   PHOS 6.2* 5.9* 6.2*   MG 2.3 2.3 2.5        Recent Labs     23  0023 23  0549 23  0623   PH 7.490* 7.483* 7.472*   PCO2 31.1* 32.5* 35.9   PO2 44* 47* 118*   HCO3 23.7* 24.4 26.2   POCSATURATED 84* 86* 99   BE 0 1 3       ASSESSMENT/INTERVENTIONS  Patient resting comfortably. No respiratory concerns at this time.    Last VS   Temp: 97.4 °F (36.3 °C) (840)  Pulse: 66 (840)  Resp: 20 (840)  BP: 134/70 (840)  SpO2: 96 % (840)    Level of Consciousness: Level of Consciousness (AVPU):  alert  Respiratory Effort: Respiratory Effort: Pursed lips, Labored, Short of breath Expansion/Accessory Muscle Usage: Expansion/Accessory Muscles/Retractions: accessory muscle use  All Lung Field Breath Sounds: All Lung Fields Breath Sounds: crackles  FRED Breath Sounds: clear  LLL Breath Sounds: diminished  RUL Breath Sounds: clear  RML Breath Sounds: diminished  RLL Breath Sounds: diminished  O2 Device/Concentration: 3L NC  NIPPV: No Surgical airway: No  ETCO2 monitored:    Ambu at bedside: Ambu bag with the patient?: Yes, Adult Ambu    Active Orders   Respiratory Care    ACAPELLA TREATMENT Q4H     Frequency: Q4H     Number of Occurrences: Until Specified    Chest physiotherapy Q4H     Frequency: Q4H     Number of Occurrences: Until Specified     Order Questions:      Indications: ATELECTASIS NONRESPONSIVE TO TX    Inhalation Treatment Q4H PRN     Frequency: Q4H PRN     Number of Occurrences: Until Specified    Inhalation Treatment Q4H WAKE     Frequency: Q4H WAKE     Number of Occurrences: Until Specified    Oxygen Continuous     Frequency: Continuous     Number of Occurrences: Until Specified     Order Questions:      Device type: Low flow      Device: Nasal Cannula (1- 5 Liters)      LPM: 2      Titrate O2 per Oxygen Titration Protocol: Yes      To maintain SpO2 goal of: >= 90%      Notify MD of: Inability to achieve desired SpO2; Sudden change in patient status and requires 20% increase in FiO2; Patient requires >60% FiO2       RECOMMENDATIONS    We recommend: RRT Recs: Continue POC per primary team.    FOLLOW-UP    Please call back the Rapid Response RT, Meli Phelps, RRT at x [phone] for any questions or concerns.

## 2023-02-27 NOTE — MED STUDENT ASSESSMENT & PLAN
# Acute kidney injury superimposed on CKD  Mr. Phelps is a 68 yo man with a kidney transplant currently on immunosuppressants and CKD4, HTN, CAD, pAF, HFpEF, anemia of renal disease, recently discharge 2/2 COVID pneumonia 02/10. Presented with oxygen desaturation to 88%, but was not having worsened dyspnea or SOB.  - recent labs show elevated BUN and Cr (48/3.7 both consistent with previous CMP), average 2.8  - anemia of renal disease evident H/H 7.3/24.1 --> improvements see 02-MAR 8.6/25.7  - After discussion with kidney transplant, aim to hav 3-4 L UOP within 24 hours, increase Lasix drip to 15 mg/h and add 500 mg BID chlorothiazide. Potentially push the kidney if this is due to fluid overload; otherwise, patient might be looking at dialysis  - UOP 01-Mar 5.9 L on 15 mg/h Lasix and 500 mg BID Diuril --> Diuril d/c and Lasix dec to 10mg/h  - UOP 02-MAR 5.4L on 10mg/h lasix still above out goal output, but no change to Cr between 01 and 02 MAR, maybe maintain.  - Switched From IV to Injection or PO meds, including 80mg Lasix injection BID --> UOP 02-03MAR 1,450mL  - Seen by palliative care, [facility] were had, patient has stated he does not want to be on dialysis. Palliative will be continuing to follow  - Cr rise noted 3.9 to 4.1 (03MAR) inc of 5.1%  - switched to oral meds    PLAN:  [  ] continue home immunosuppressant meds  [  ] Epoetin samara injection 20,000 U Q7d  [  ] Palliative and Kidney transplant following  [  ] starting Dispo plans - Patient and wife prefer Home Health, patient is off IV meds/switched to PO, possibly ready for discharge    # Delirium  - RESOLVED, no issues overnight 02-03MAR    # Fall overnight  - No issues, besides the one event  - notes indicate the patient had an unwitnessed fall. Both the patient and his wife say it was not a fall, he got tangled in the wires when trying to get out of bed.  - CT Head non con ordered - NORMAL    # hypoxic event overnight  - No issues at the moment, patient on  Room Air    - patient oxygen sat dropped into the 60s, placed on breathing treatment per RR, Sat O2 80s (26-27 Feb)  - suspected fluid overload to be the cause of event, CXR showing decreased R hemidiaphragm angle, possible pleural effusion  - 27 Feb ABG AM pH 7.472, pCO2 35.9, PO2 118, pHCo3 26.2 (improved compared to yesterday 7.483/32.5/47/24.4)  - CT Abdomen/Pelvis shows bilateral pleural effusion    # H/o Kidney transplant  - see KATINA on CKD  - Consulted palliative care - patient wishes for no dialysis    # Wheezing  - albuterol PRN    # anemia of renal disease  - see KATINA on CKD  - H/H improving    # HFpEF  - daily weight, I/Os, continue home metoprolol  - 80mg BID Lasix injection  - CT Abdomen/Pelvis shows bilateral pleural effusion    # pAF  - home meds eliquis, metoprolol    # CAD  - home statin and aspirin, metoprolol    # HTN  - continue metoprolol, nifedipine, and hydralazine    # DVT Ppx  - Eliquis 5 mg restarted following normal (no bleeds) CT Abdomen and pelvis

## 2023-02-27 NOTE — CARE UPDATE
RAPID RESPONSE NURSE FOLLOW-UP NOTE       Followed up with patient for a rapid response.  No acute issues at this time. Reviewed plan of care with bedside RNViola .  Pt remains on BIPAP on 30% FiO2 sating 100%, CXR, ABG ordered and pending collection. Continuous lasix gtt ordered per primary team.      Team will continue to follow.  Please call Rapid Response RN, Noe Perez RN with any questions or concerns at [phone].

## 2023-02-27 NOTE — ASSESSMENT & PLAN NOTE
trasnsplant kidney times 2 first one was in 1992 and second one was 2005 patient on prograf 2 mg BID and prednisone 5 mg po qday   KATINA on CKD   baseline cr is high 2.6-2.8.   Non oliguric   UA bland   Transplant renal US no hydro   Patient having difficult time urinating   No improvement with IVF cr same today at 3.8   Bicarb 16 c/w acidosis in the setting of KATINA   Euvolemic on exam   Can not rule out underlying rejection and DSA pending, but patient unlikely to benefit from biopsy at this time  Ok to continue diuresis for now, consider echo for cvp eval

## 2023-02-27 NOTE — PROGRESS NOTES
[facility] - Intensive Care ([place] [id number])  Cardiac Electrophysiology  Progress Note    Admission Date: 2/20/2023  Code Status: Prior   Attending Physician: Morris Payan MD   Expected Discharge Date: 2/28/2023  Principal Problem:Acute kidney injury superimposed on CKD    Subjective:     Interval History: overnight pt with episode of worsening hypoxia that was attributed to hypervolemia. S/p IV lasix. Placed on bipap at night and now on 2L NC.out 800 cc overnight. Hgb 6.6 this AM.   In NSR with no ectopy.      Review of Systems   Constitutional: Negative for fever and malaise/fatigue.   Eyes:  Negative for blurred vision and pain.   Cardiovascular:  Negative for chest pain, dyspnea on exertion, leg swelling, orthopnea, palpitations and paroxysmal nocturnal dyspnea.   Respiratory:  Negative for cough, shortness of breath, sputum production and wheezing.    Hematologic/Lymphatic: Negative for adenopathy and bleeding problem.   Skin:  Negative for rash.   Musculoskeletal:  Negative for back pain and neck pain.   Gastrointestinal:  Negative for abdominal pain, constipation, diarrhea, nausea and vomiting.   Genitourinary:  Negative for dysuria.   Neurological:  Negative for dizziness, headaches, light-headedness and weakness.   Objective:     Vital Signs (Most Recent):  Temp: 97.6 °F (36.4 °C) (02/27/23 0456)  Pulse: 85 (02/27/23 0722)  Resp: 18 (02/27/23 0456)  BP: (!) 148/67 (02/26/23 2312)  SpO2: (!) 93 % (02/27/23 0722)   Vital Signs (24h Range):  Temp:  [97.4 °F (36.3 °C)-98.5 °F (36.9 °C)] 97.6 °F (36.4 °C)  Pulse:  [63-90] 85  Resp:  [16-28] 18  SpO2:  [80 %-99 %] 93 %  BP: (148-176)/(67-79) 148/67       Weight: 108.4 kg (238 lb 15.7 oz)  Body mass index is 31.53 kg/m².    SpO2: (!) 93 %       Physical Exam  Constitutional:       General: He is not in acute distress.     Appearance: Normal appearance. He is not ill-appearing, toxic-appearing or diaphoretic.   HENT:      Head: Normocephalic and atraumatic.       Nose: Nose normal.   Eyes:      Extraocular Movements: Extraocular movements intact.      Pupils: Pupils are equal, round, and reactive to light.   Cardiovascular:      Rate and Rhythm: Normal rate and regular rhythm.      Heart sounds: No murmur heard.    No friction rub. No gallop.   Pulmonary:      Effort: Pulmonary effort is normal. No respiratory distress.      Breath sounds: Normal breath sounds. No wheezing or rales.   Abdominal:      General: Abdomen is flat. There is no distension.      Palpations: Abdomen is soft. There is no mass.      Tenderness: There is no abdominal tenderness.   Musculoskeletal:         General: No swelling. Normal range of motion.      Cervical back: Normal range of motion. No rigidity.      Right lower leg: No edema.      Left lower leg: No edema.   Skin:     General: Skin is warm and dry.      Coloration: Skin is not jaundiced.      Findings: No bruising.   Neurological:      General: No focal deficit present.      Mental Status: He is alert and oriented to person, place, and time.      Cranial Nerves: No cranial nerve deficit.      Motor: No weakness.       Significant Labs: BMP:   Recent Labs   Lab 02/26/23  0532 02/27/23  0051 02/27/23  0246   GLU 80 115* 89   * 136 138   K 4.1 4.0 4.7    102 104   CO2 18* 22* 22*   BUN 48* 50* 46*   CREATININE 3.7* 3.9* 3.8*   CALCIUM 7.3* 7.6* 7.7*   MG 2.3 2.3 2.5      and CBC:   Recent Labs   Lab 02/26/23  0532 02/27/23  0023 02/27/23  0051 02/27/23  0246 02/27/23  0549 02/27/23  0623   WBC 8.01  --  10.80 10.35  --   --    HGB 7.3*  --  6.8* 6.6*  --   --    HCT 25.3*   < > 23.3* 23.1*   < > 22*     --  223 230  --   --     < > = values in this interval not displayed.         Significant Imaging: Reviewed     Assessment and Plan:     Paroxysmal atrial fibrillation  Tele reviewed and pt noted to be in NSR with HR in 70s     -Given kidney function, continue to hold flecainide   -[id number] is 4 (HFpEF, HTN, aortic  atherosclerosis, male gender, age 65-74), thus would continue Eliquis at this time   - EP will sign off  Please do not hesitate to reach back with any questions or concerns.         Erasmo Navarro MD  Cardiac Electrophysiology  [facility] - Intensive Care ([place])

## 2023-02-27 NOTE — PT/OT/SLP EVAL
"Physical Therapy Co-Evaluation and Co-Treatment    Co-Tx with OT due to decreased activity tolerance and anticipated level of skilled assistance required for functional mobility    Patient Name:  Ibrahima Phelps   MRN:  [id number]    Recommendations:     Discharge Recommendations: nursing facility, skilled (May progress to HHPT)   Discharge Equipment Recommendations:  (pending progress in PT POC)   Barriers to discharge: Inaccessible home and Decreased caregiver support    Assessment:     Ibrahima Phelps is a 67 y.o. male admitted with a medical diagnosis of Acute kidney injury superimposed on CKD.  He presents with the following impairments/functional limitations: weakness, impaired endurance, impaired functional mobility, gait instability, decreased lower extremity function, pain, decreased ROM, impaired cardiopulmonary response to activity.    Patient very motivated to participate in PT eval, spouse at bedside. Patient primarily limited by pain 2/2 gout exacerbation in R foot with 10/10 pain in weight bearing, associated R force production deficit at eval. Pt requires MAXAx1 sup>sit, CGA to stand at EOB (second trial), unable to weight shift in order to tolerate gait training today. Patient presents below PLOF and would benefit from acute care skilled PT to address deficits detailed above and promote safe and functional independence.    Rehab Prognosis: Good; patient would benefit from acute skilled PT services to address these deficits and reach maximum level of function.    Recent Surgery: * No surgery found *      Plan:     During this hospitalization, patient to be seen 3 x/week to address the identified rehab impairments via gait training, therapeutic activities, therapeutic exercises, neuromuscular re-education and progress toward the following goals:    Plan of Care Expires:  03/20/23    Subjective     Chief Complaint: "I need to do this (stand at EOB)"  Patient/Family Comments/goals: Walk   Pain/Comfort:  Pain " Rating 1: 7/10  Location - Side 1: Right  Location - Orientation 1: lower  Location 1: leg (and foot)  Pain Addressed 1: Reposition, Cessation of Activity, Distraction, Pre-medicate for activity  Pain Rating Post-Intervention 1: 0/10 (Increased to 10/10 with weight bearing, 0/10 supine in NWB)    Patients cultural, spiritual, Congregation conflicts given the current situation: no    Living Environment:  Patient lives with spouse in [facility] with 1 LOBO, tub/shower combo.   Prior to admission, patients level of function was independent, denies falls in past 3mo.  Equipment used at home: none.  DME owned (not currently used): none.  Upon discharge, patient will have assistance from spouse.    Objective:     Communicated with RN, OT prior to session.  Patient found supine with telemetry, oxygen, pearson catheter, peripheral IV  upon PT entry to room.    General Precautions: Standard, fall  Orthopedic Precautions:N/A   Braces: N/A  Respiratory Status: Nasal cannula, flow 3 L/min  Vitals with activity: SaO2 97% on 3lpm NC, HR 71bpm    Exams:  Cognitive Exam:  Patient is oriented to Person, Place, Time, and Situation  Sensation:    -       Impaired  analgesia/hypersensitivity R lower LE and foot  Skin Integrity/Edema:      -       Skin integrity: Visible skin intact  RLE ROM: WNL  RLE Strength: Deficits: grossly 3+/5 2/2 pain with external pressure RLE  LLE ROM: WFL  LLE Strength: WFL    Functional Mobility:  Bed Mobility:     Scooting: SBA  X5 lateral scoots to the R (HOB) with 0% VC and adequate clearance of hips from surface  Supine to Sit: maximal assistance  For RLE mgmt and trunk control  Sit to Supine: maximal assistance  Transfers:     Sit to Stand:  contact guard assistance with no AD  2 trials  First trial: partial stand requires MAXAx2 without AD limited by pain, improved participation in task 2nd trial from EOB  Gait:Attempted, however unable to weight shift 2/2 R foot pain  Balance: seated EOB: Sup with intermittent  UE support  Static stand: CGA without UE support  Dynamic stand: unable      AM-PAC 6 CLICK MOBILITY  Total Score:11       Treatment & Education:  Performed functional mobility training detailed above.     Patient and spouse educated on role of physical therapy, PT POC, importance of OOB activity, safe functional mobility techniques, use of call light, presence of staff for all out of bed mobility at this time. Allotted time for all questions to be answered. Patient verbalizes understanding of education provided.     Patient left HOB elevated with all lines intact, call button in reach, and spouse present.    GOALS:   Multidisciplinary Problems       Physical Therapy Goals          Problem: Physical Therapy    Goal Priority Disciplines Outcome Goal Variances Interventions   Physical Therapy Goal     PT, PT/OT Ongoing, Progressing     Description: Goals to be met by: 3/20/2023      Patient will increase functional independence with mobility by performin. Supine to sit with Supervision  2. Sit to supine with Supervision  3. Sit to stand transfer with Stand-by Assistance  4. Bed to chair transfer with Stand-by Assistance using Rolling Walker  5. Gait  x 100 feet with Stand-by Assistance using Rolling Walker.   6. Ascend/Descend 6 inch curb step with Contact Guard Assistance using Rolling Walker.                         History:     Past Medical History:   Diagnosis Date    (HFpEF) heart failure with preserved ejection fraction 2017    Atrial fibrillation     CAD (coronary artery disease)     CHF (congestive heart failure)     Chronic diastolic heart failure 2020    CKD (chronic kidney disease) stage 3, GFR 30-59 ml/min     Dr. Latif    CKD (chronic kidney disease) stage 3, GFR 30-59 ml/min     Diverticulosis     Fever blister     Heart attack     Hyperlipidemia     Hypertension     Immunodeficiency due to treatment with immunosuppressive medication     Keloid cicatrix     Left lower quadrant  "abdominal pain 6/5/2022    Metabolic bone disease     Pericarditis     S/P kidney transplant     Supraventricular tachycardia 06/2019    Thrombocytopenia     Thyroid disease     Tobacco use 9/5/2014    Unspecified disorder of kidney and ureter     Stage IIICKD       Past Surgical History:   Procedure Laterality Date    CARDIOVERSION  04/30/15    CHOLECYSTECTOMY      COLONOSCOPY  April 20, 2011    Diverticulosis, repeat recommended in 3 yrs., repeat colonoscopy 2014 revealed 2 polyps.  He should return in 5 years.    COLONOSCOPY N/A 3/13/2020    Procedure: COLONOSCOPY;  Surgeon: Chon Casper MD;  Location: [facility] (4TH FLR);  Service: Endoscopy;  Laterality: N/A;  ok to hold coumadin x5days-see telephone encounter 2/4/20-tb    COLONOSCOPY N/A 2/4/2021    Procedure: COLONOSCOPY;  Surgeon: Chon Casper MD;  [facility]  Service: Endoscopy;  Laterality: N/A;  Eliquis - per Dr. GAVIN Blunt ok to hold x 2 days and "restarted asap"- ERW  last prep "poor", [id number] ordered/ Pt requests Dr. Casper  prep ins. mailed - COVID screening on 2/1/21 PCW- ERW    COLONOSCOPY N/A 3/3/2021    Procedure: COLONOSCOPY;  Surgeon: Chon Casper MD;  [facility]  Service: Endoscopy;  Laterality: N/A;  Patient with his second poor bowel pre and has poor prep today.  If patient not intersted or can't get colonoscopy tomorrow will need constipation bowel prep and will need to restart his Eliquis today.Thanks,Chon     per Dr Blunt-ok to hold Eliquis 2 days prior      COVID     CORONARY ANGIOPLASTY WITH STENT PLACEMENT  9/13/2006    KIDNEY TRANSPLANT  2005    PARATHYROIDECTOMY      TREATMENT OF CARDIAC ARRHYTHMIA N/A 3/28/2022    Procedure: CARDIOVERSION;  Surgeon: Migue Blunt MD;  Location: [facility] EP LAB;  Service: Cardiology;  Laterality: N/A;  AF, DCC, MAC, GP, 3 PREP*RODRIGO deferred pt 100% compliant*       Time Tracking:     PT Received On: 02/27/23  PT Start Time: 1005     PT Stop Time: " 1037  PT Total Time (min): 32 min     Billable Minutes: Evaluation 9 and Therapeutic Activity 23 02/27/2023

## 2023-02-27 NOTE — PLAN OF CARE
Problem: Adult Inpatient Plan of Care  Goal: Plan of Care Review  Outcome: Ongoing, Progressing     Problem: Fluid and Electrolyte Imbalance (Acute Kidney Injury/Impairment)  Goal: Fluid and Electrolyte Balance  Outcome: Ongoing, Progressing     Problem: Oral Intake Inadequate (Acute Kidney Injury/Impairment)  Goal: Optimal Nutrition Intake  Outcome: Ongoing, Progressing     Problem: Renal Function Impairment (Acute Kidney Injury/Impairment)  Goal: Effective Renal Function  Outcome: Ongoing, Progressing     Problem: Infection  Goal: Absence of Infection Signs and Symptoms  Outcome: Ongoing, Progressing     Problem: Fall Injury Risk  Goal: Absence of Fall and Fall-Related Injury  Outcome: Ongoing, Progressing

## 2023-02-27 NOTE — ASSESSMENT & PLAN NOTE
Patient denies any bleeding no melena nor bleeding per rectum   Being transfused today per primary team   Management per primary team

## 2023-02-27 NOTE — MEDICAL/APP STUDENT
Progress Note  Hospital Medicine    Patient Name: Ibrahima Phelps  YOB: 1955    Admit Date: 2/20/2023                     LOS: 7    SUBJECTIVE:     Reason for Admission:  Acute kidney injury superimposed on CKD    HPI:  67-year-old black male transplant kidney patient being admitted for KATINA.  Patient was just discharged from a hospitalization for COVID pneumonia on 02/10/2023.  He also paid in ED visit a few days ago for what could have been a mild cellulitis/folliculitis on his foot for which he is almost done with his Keflex regimen.    Patient was instructed to come to the ED this time by the COVID surveillance nurses due to desaturation down to 88%.  When I talked with the patient and his wife in detail, they affirmed that he was not having any new or worsening shortness of breath since his discharge.  His wife did report that once the patient woke up/sat up his desaturation spontaneously resolved.  Patient denies any steven current shortness of breath or chest pain.  No nausea vomiting or flu fever like feeling.  In my discussion with the ED provider who was noted that the patient was complaining of shortness of breath but there was a concern that this likely was anxiety triggered.  Patient seems to be saturating well on room air here in the ED and chest x-ray which I personally reviewed is clear bilaterally.  Wife reports patient has been compliant with his medications.    The blood work did demonstrate an acutely elevated creatinine of 3.8 whereas his baseline is in the 2s.    Hospital Course:  67-year-old renal transplant black male was admitted for acute on chronic kidney injury.  Patient was just discharged from hospitalization for COVID pneumonia on 02/10/2023.  Presented to the ED per recommendations of the COVID surveillance team.  Did not have any definitive acute respiratory findings to indicate acute ongoing infectious COVID, chest x-ray was clear.  However creatinine was significantly  elevated up to 3.8 whereas baseline was in the 2s.  Was started on IV fluids with trivial improvement.    Interval history: Severe dyspenic episode overnight, rapid response called, and patient was started on breathing treatment/BiPAP --> SatO2 improved to 80s from 60s. Patient was suspected of fluid overload causing this event, given 80 mg Lasix, started on infusion of 500 mg Lasix at 10 mg/h dose, rate of 1 mL/h. Patient was sleeping comfortably, on BiPAP. Resp team was in the room asking if we can discontinue the BiPAP as no orders were placed (may need orders for QHS per RR team overnight). Spoke to patient's wife, who said he did wake up earlier and was going better.    Review of Systems:  Review of Systems   Unable to perform ROS    OBJECTIVE:     Vital Signs (Latest):  @FLOWLABEL[6,8,9,5,10]    Vital Signs Range (Last 24H):  Temp:  [97.4 °F (36.3 °C)-98.5 °F (36.9 °C)]   Pulse:  [61-90]   Resp:  [16-28]   BP: (134-176)/(67-79)   SpO2:  [80 %-100 %]     Body mass index is 31.53 kg/m².    Wt Readings from Last 3 Encounters:   02/27/23 0648 108.4 kg (238 lb 15.7 oz)   02/20/23 1158 101.6 kg (224 lb)   02/10/23 0513 101.6 kg (224 lb)   02/07/23 2305 101.9 kg (224 lb 10.4 oz)   02/07/23 1102 107.5 kg (237 lb)   01/12/23 1048 103.6 kg (228 lb 6.3 oz)       Physical Exam:  Physical Exam    I & O (Last 24H):  Intake/Output Summary (Last 24 hours) at 2/27/2023 0916  Last data filed at 2/27/2023 0519  Gross per 24 hour   Intake 720 ml   Output 800 ml   Net -80 ml       Significant Labs:    CBC  Recent Labs   Lab 02/26/23  0532 02/27/23  0023 02/27/23  0051 02/27/23  0246 02/27/23  0549 02/27/23  0623   WBC 8.01  --  10.80 10.35  --   --    HGB 7.3*  --  6.8* 6.6*  --   --    HCT 25.3*   < > 23.3* 23.1* 20* 22*     --  223 230  --   --     < > = values in this interval not displayed.       CMP  Recent Labs   Lab 02/20/23  1233 02/21/23  0938 02/23/23  0808 02/24/23  0819 02/25/23  1402 02/26/23  0532  02/27/23  0051 02/27/23  0246   *   < > 135* 137   < > 135* 136 138   K 4.5   < > 4.3 4.1   < > 4.1 4.0 4.7      < > 106 106   < > 104 102 104   CO2 18*   < > 18* 19*   < > 18* 22* 22*   ANIONGAP 13   < > 11 12   < > 13 12 12   BUN 34*   < > 41* 41*   < > 48* 50* 46*   CREATININE 3.8*   < > 4.0* 3.8*   < > 3.7* 3.9* 3.8*      < > 109 95   < > 80 115* 89   CALCIUM 7.3*   < > 6.7* 7.2*   < > 7.3* 7.6* 7.7*   MG  --   --   --   --    < > 2.3 2.3 2.5   PHOS  --    < > 6.1* 6.4*   < > 6.2* 5.9* 6.2*   ALKPHOS 72  --   --   --   --   --  78  --    ALT 22  --   --   --   --   --  20  --    AST 22  --   --   --   --   --  25  --    ALBUMIN 2.4*  --  2.1* 2.2*  --   --  2.2*  --    PROT 7.0  --   --   --   --   --  7.3  --    BILITOT 0.5  --   --   --   --   --  0.4  --     < > = values in this interval not displayed.      Lab Results   Component Value Date    INR 1.2 02/07/2023    INR 1.1 03/21/2022    INR 1.2 06/17/2020    INR 1.2 06/17/2020     Lab Results   Component Value Date    HGBA1C 5.5 01/04/2023     No results for input(s): POCTGLUCOSE in the last 72 hours.    Urinalysis  No results for input(s): COLORU, CLARITYU, SPECGRAV, PHUR, PROTEINUA, GLUCOSEU, BILIRUBINCON, BLOODU, WBCU, RBCU, BACTERIA, MUCUS, NITRITE, LEUKOCYTESUR, UROBILINOGEN, HYALINECASTS in the last 24 hours.     Microbiology  Microbiology Results (last 7 days)       Procedure Component Value Units Date/Time    Respiratory Infection Panel (PCR), Nasopharyngeal [[id number]]     Order Status: No result Specimen: Nasopharyngeal Swab     Aerobic culture [[id number]]     Order Status: No result Specimen: Pleural Fluid     Culture, Anaerobic [[id number]]     Order Status: No result Specimen: Pleural Fluid     Gram stain [[id number]]     Order Status: No result Specimen: Pleural Fluid     AFB Culture & Smear [[id number]]     Order Status: No result Specimen: Pleural Fluid              Imaging  Imaging Results              X-Ray Chest AP Portable  (Final result)  Result time 02/20/23 12:35:47      Final result by Bryon Nguyen MD (02/20/23 12:35:47)                   Impression:      See above      Electronically signed by: Bryon Nguyen MD  Date:    02/20/2023  Time:    12:35               Narrative:    EXAMINATION:  XR CHEST AP PORTABLE    CLINICAL HISTORY:  COVID-19;    TECHNIQUE:  Single frontal view of the chest was performed.    COMPARISON:  No 02/07/2023 ne    FINDINGS:  Heart size normal.  No significant airspace consolidation or pleural effusion identified                                         ASSESSMENT/PLAN:     Mr. Ibrahima Phelps is a 67 y.o. male with a kidney transplant currently on immunosuppressants and CKD4, HTN, CAD, pAF, HFpEF, anemia of renal disease, recently discharge 2/2 COVID pneumonia 02/10. Presented with oxygen desaturation to 88%, but was not having worsened dyspnea or SOB.    1. Long-term use of immunosuppressant medication    2. SOB (shortness of breath)    3. COVID-19    4. Atrial fibrillation    5. H/O kidney transplant    6. Hypoxia    7. Acute kidney injury superimposed on CKD    8. Shortness of breath        # Acute kidney injury superimposed on CKD  Mr. Phelps is a 68 yo man with a kidney transplant currently on immunosuppressants and CKD4, HTN, CAD, pAF, HFpEF, anemia of renal disease, recently discharge 2/2 COVID pneumonia 02/10. Presented with oxygen desaturation to 88%, but was not having worsened dyspnea or SOB.  - recent labs show elevated BUN and Cr (46/3.8 both consistent with previous CMP)  - anemia of renal disease evident H/H 6.6/23.1    PLAN:  [  ] continue IVF  [  ] daily BMP  [  ] continue home immunosuppressant meds  [  ] Epoetin samara injection 20,000 U Q7d  [  ] Lasix 80 mg now infusion 10 mg/h (500 mg/24h)    # hypoxic event overnight  - patient oxygen sat dropped into the 60s, placed on breathing treatment per RR, Sat O2 80s  - suspected fluid overload to be the cause of event, CXR showing decreased R  hemidiaphragm angle, possible pleural effusion  - ABG AM pH 7.472, pCO2 35.9, PO2 118, pHCo3 26.2 (improved compared to yesterday 7.483/32.5/47/24.4)    PLAN:  [  ] Lasix 80 mg now infusion 10 mg/h (500 mg/24h)  [  ] BiPAP QHS NEEDS orders placed  [  ] repeat ABG if dyspneic episode occurs  [  ] CXR daily    # H/o Kidney transplant  - see KATINA on CKD    # Wheezing  - albuterol PRN    # anemia of renal disease  - see KATINA on CKD    # HFpEF  - daily weight, I/Os, continue home metoprolol, daily BNP  - Lasix 80 mg now infusion 10 mg/h (500 mg/24h)    # pAF  - home meds eliquis, flecainide, metoprolol    # CAD  - home statin and aspirin, Toprol    # HTN  - continue metoprolol, nifedipine, and hydralazine    # DVT Ppx  - Eliquis 5 mg      VTE Risk Mitigation (From admission, onward)      None            DISCHARGE PLANNING:  Discharge Planning   RAMU: 2/28/2023     Code Status: Prior   Is the patient medically ready for discharge?: No    Reason for patient still in hospital (select all that apply): Patient unstable, Patient trending condition, Laboratory test, Treatment, Consult recommendations, PT / OT recommendations, and Pending disposition  Discharge Plan A: Home with family          Signing Physician:  Malik Welch, MS4  [facility]

## 2023-02-28 PROBLEM — J96.01 ACUTE HYPOXEMIC RESPIRATORY FAILURE: Status: ACTIVE | Noted: 2023-02-28

## 2023-02-28 PROBLEM — R41.0 DELIRIUM: Status: ACTIVE | Noted: 2023-02-28

## 2023-02-28 PROBLEM — R53.81 DEBILITY: Status: ACTIVE | Noted: 2022-06-08

## 2023-02-28 LAB
ANION GAP SERPL CALC-SCNC: 14 MMOL/L (ref 8–16)
BUN SERPL-MCNC: 48 MG/DL (ref 8–23)
CALCIUM SERPL-MCNC: 7.8 MG/DL (ref 8.7–10.5)
CHLORIDE SERPL-SCNC: 104 MMOL/L (ref 95–110)
CLASS I ANTIBODY COMMENTS - LUMINEX: NORMAL
CLASS II ANTIBODY COMMENTS - LUMINEX: NORMAL
CO2 SERPL-SCNC: 22 MMOL/L (ref 23–29)
CREAT SERPL-MCNC: 3.7 MG/DL (ref 0.5–1.4)
DSA1 TESTING DATE: NORMAL
DSA12 TESTING DATE: NORMAL
DSA2 TESTING DATE: NORMAL
ERYTHROCYTE [DISTWIDTH] IN BLOOD BY AUTOMATED COUNT: 15.9 % (ref 11.5–14.5)
EST. GFR  (NO RACE VARIABLE): 17.1 ML/MIN/1.73 M^2
FOLATE SERPL-MCNC: 8.8 NG/ML (ref 4–24)
GLUCOSE SERPL-MCNC: 84 MG/DL (ref 70–110)
HCT VFR BLD AUTO: 24.1 % (ref 40–54)
HGB BLD-MCNC: 7.3 G/DL (ref 14–18)
MCH RBC QN AUTO: 24.7 PG (ref 27–31)
MCHC RBC AUTO-ENTMCNC: 30.3 G/DL (ref 32–36)
MCV RBC AUTO: 82 FL (ref 82–98)
PLATELET # BLD AUTO: 231 K/UL (ref 150–450)
PMV BLD AUTO: 11 FL (ref 9.2–12.9)
POTASSIUM SERPL-SCNC: 4.2 MMOL/L (ref 3.5–5.1)
RBC # BLD AUTO: 2.95 M/UL (ref 4.6–6.2)
SERUM COLLECTION DT - LUMINEX CLASS I: NORMAL
SERUM COLLECTION DT - LUMINEX CLASS II: NORMAL
SODIUM SERPL-SCNC: 140 MMOL/L (ref 136–145)
TACROLIMUS BLD-MCNC: 7.4 NG/ML (ref 5–15)
VIT B12 SERPL-MCNC: 983 PG/ML (ref 210–950)
WBC # BLD AUTO: 8.76 K/UL (ref 3.9–12.7)

## 2023-02-28 PROCEDURE — 99900035 HC TECH TIME PER 15 MIN (STAT)

## 2023-02-28 PROCEDURE — 93010 EKG 12-LEAD: ICD-10-PCS | Mod: ,,, | Performed by: INTERNAL MEDICINE

## 2023-02-28 PROCEDURE — 94761 N-INVAS EAR/PLS OXIMETRY MLT: CPT

## 2023-02-28 PROCEDURE — 63600175 PHARM REV CODE 636 W HCPCS: Performed by: HOSPITALIST

## 2023-02-28 PROCEDURE — 63600175 PHARM REV CODE 636 W HCPCS: Performed by: INTERNAL MEDICINE

## 2023-02-28 PROCEDURE — 99233 PR SUBSEQUENT HOSPITAL CARE,LEVL III: ICD-10-PCS | Mod: ,,, | Performed by: INTERNAL MEDICINE

## 2023-02-28 PROCEDURE — 80197 ASSAY OF TACROLIMUS: CPT | Performed by: HOSPITALIST

## 2023-02-28 PROCEDURE — 25000003 PHARM REV CODE 250: Performed by: HOSPITALIST

## 2023-02-28 PROCEDURE — 93010 ELECTROCARDIOGRAM REPORT: CPT | Mod: ,,, | Performed by: INTERNAL MEDICINE

## 2023-02-28 PROCEDURE — 85027 COMPLETE CBC AUTOMATED: CPT | Performed by: INTERNAL MEDICINE

## 2023-02-28 PROCEDURE — 87799 DETECT AGENT NOS DNA QUANT: CPT | Performed by: STUDENT IN AN ORGANIZED HEALTH CARE EDUCATION/TRAINING PROGRAM

## 2023-02-28 PROCEDURE — 82746 ASSAY OF FOLIC ACID SERUM: CPT | Performed by: STUDENT IN AN ORGANIZED HEALTH CARE EDUCATION/TRAINING PROGRAM

## 2023-02-28 PROCEDURE — 82607 VITAMIN B-12: CPT | Performed by: STUDENT IN AN ORGANIZED HEALTH CARE EDUCATION/TRAINING PROGRAM

## 2023-02-28 PROCEDURE — 80048 BASIC METABOLIC PNL TOTAL CA: CPT | Performed by: INTERNAL MEDICINE

## 2023-02-28 PROCEDURE — 99233 SBSQ HOSP IP/OBS HIGH 50: CPT | Mod: ,,, | Performed by: INTERNAL MEDICINE

## 2023-02-28 PROCEDURE — 25000003 PHARM REV CODE 250: Performed by: INTERNAL MEDICINE

## 2023-02-28 PROCEDURE — 12000002 HC ACUTE/MED SURGE SEMI-PRIVATE ROOM

## 2023-02-28 PROCEDURE — 97116 GAIT TRAINING THERAPY: CPT | Mod: CQ

## 2023-02-28 PROCEDURE — 27000221 HC OXYGEN, UP TO 24 HOURS

## 2023-02-28 PROCEDURE — 94640 AIRWAY INHALATION TREATMENT: CPT

## 2023-02-28 PROCEDURE — 93005 ELECTROCARDIOGRAM TRACING: CPT

## 2023-02-28 PROCEDURE — 94799 UNLISTED PULMONARY SVC/PX: CPT

## 2023-02-28 PROCEDURE — 25000242 PHARM REV CODE 250 ALT 637 W/ HCPCS: Performed by: INTERNAL MEDICINE

## 2023-02-28 PROCEDURE — 97530 THERAPEUTIC ACTIVITIES: CPT | Mod: CQ

## 2023-02-28 PROCEDURE — 36415 COLL VENOUS BLD VENIPUNCTURE: CPT | Performed by: HOSPITALIST

## 2023-02-28 PROCEDURE — 94664 DEMO&/EVAL PT USE INHALER: CPT

## 2023-02-28 RX ORDER — DIVALPROEX SODIUM 250 MG/1
250 TABLET, DELAYED RELEASE ORAL NIGHTLY
Status: DISCONTINUED | OUTPATIENT
Start: 2023-02-28 | End: 2023-03-01

## 2023-02-28 RX ADMIN — IPRATROPIUM BROMIDE AND ALBUTEROL SULFATE 3 ML: 2.5; .5 SOLUTION RESPIRATORY (INHALATION) at 07:02

## 2023-02-28 RX ADMIN — ASPIRIN 81 MG: 81 TABLET, COATED ORAL at 10:02

## 2023-02-28 RX ADMIN — FAMOTIDINE 20 MG: 20 TABLET ORAL at 10:02

## 2023-02-28 RX ADMIN — TACROLIMUS 2.5 MG: 1 CAPSULE ORAL at 06:02

## 2023-02-28 RX ADMIN — DIVALPROEX SODIUM 250 MG: 250 TABLET, DELAYED RELEASE ORAL at 09:02

## 2023-02-28 RX ADMIN — SODIUM BICARBONATE 1300 MG: 650 TABLET ORAL at 09:02

## 2023-02-28 RX ADMIN — HYDRALAZINE HYDROCHLORIDE 50 MG: 25 TABLET, FILM COATED ORAL at 09:02

## 2023-02-28 RX ADMIN — COLCHICINE 0.3 MG: 0.6 TABLET, FILM COATED ORAL at 12:02

## 2023-02-28 RX ADMIN — HYDRALAZINE HYDROCHLORIDE 50 MG: 25 TABLET, FILM COATED ORAL at 10:02

## 2023-02-28 RX ADMIN — TRAMADOL HYDROCHLORIDE 25 MG: 50 TABLET, COATED ORAL at 02:02

## 2023-02-28 RX ADMIN — FLUTICASONE FUROATE AND VILANTEROL TRIFENATATE 1 PUFF: 100; 25 POWDER RESPIRATORY (INHALATION) at 07:02

## 2023-02-28 RX ADMIN — CHLOROTHIAZIDE SODIUM 500 MG: 500 INJECTION INTRAVENOUS at 10:02

## 2023-02-28 RX ADMIN — SODIUM BICARBONATE 1300 MG: 650 TABLET ORAL at 02:02

## 2023-02-28 RX ADMIN — DOXAZOSIN 4 MG: 2 TABLET ORAL at 09:02

## 2023-02-28 RX ADMIN — PREDNISONE 5 MG: 5 TABLET ORAL at 10:02

## 2023-02-28 RX ADMIN — APIXABAN 5 MG: 5 TABLET, FILM COATED ORAL at 09:02

## 2023-02-28 RX ADMIN — CALCIUM ACETATE 1334 MG: 667 CAPSULE ORAL at 06:02

## 2023-02-28 RX ADMIN — GABAPENTIN 100 MG: 100 CAPSULE ORAL at 09:02

## 2023-02-28 RX ADMIN — CHLOROTHIAZIDE SODIUM 500 MG: 500 INJECTION INTRAVENOUS at 09:02

## 2023-02-28 RX ADMIN — SODIUM BICARBONATE 1300 MG: 650 TABLET ORAL at 06:02

## 2023-02-28 RX ADMIN — METOPROLOL SUCCINATE 25 MG: 25 TABLET, EXTENDED RELEASE ORAL at 10:02

## 2023-02-28 RX ADMIN — DOXAZOSIN 4 MG: 2 TABLET ORAL at 10:02

## 2023-02-28 RX ADMIN — DIVALPROEX SODIUM 250 MG: 250 TABLET, DELAYED RELEASE ORAL at 06:02

## 2023-02-28 RX ADMIN — IPRATROPIUM BROMIDE AND ALBUTEROL SULFATE 3 ML: 2.5; .5 SOLUTION RESPIRATORY (INHALATION) at 03:02

## 2023-02-28 RX ADMIN — NIFEDIPINE 60 MG: 30 TABLET, FILM COATED, EXTENDED RELEASE ORAL at 10:02

## 2023-02-28 RX ADMIN — ACETAMINOPHEN 1000 MG: 500 TABLET ORAL at 02:02

## 2023-02-28 RX ADMIN — CALCIUM ACETATE 1334 MG: 667 CAPSULE ORAL at 10:02

## 2023-02-28 RX ADMIN — PARICALCITOL 1 MCG: 1 CAPSULE, LIQUID FILLED ORAL at 10:02

## 2023-02-28 RX ADMIN — ACETAMINOPHEN 1000 MG: 500 TABLET ORAL at 10:02

## 2023-02-28 RX ADMIN — CALCIUM ACETATE 1334 MG: 667 CAPSULE ORAL at 12:02

## 2023-02-28 RX ADMIN — ATORVASTATIN CALCIUM 40 MG: 20 TABLET, FILM COATED ORAL at 09:02

## 2023-02-28 RX ADMIN — TACROLIMUS 2.5 MG: 1 CAPSULE ORAL at 10:02

## 2023-02-28 RX ADMIN — IPRATROPIUM BROMIDE AND ALBUTEROL SULFATE 3 ML: 2.5; .5 SOLUTION RESPIRATORY (INHALATION) at 09:02

## 2023-02-28 RX ADMIN — NIFEDIPINE 60 MG: 30 TABLET, FILM COATED, EXTENDED RELEASE ORAL at 09:02

## 2023-02-28 RX ADMIN — HYDRALAZINE HYDROCHLORIDE 50 MG: 25 TABLET, FILM COATED ORAL at 02:02

## 2023-02-28 NOTE — ASSESSMENT & PLAN NOTE
trasnsplant kidney times 2 first one was in 1992 and second one was 2005 patient on prograf 2 mg BID and prednisone 5 mg po qday   KATINA on CKD, most consistent with progressive failing kidney transplant  baseline cr is high 2.6-2.8.   Non oliguric   UA bland   Transplant renal US no hydro   No plan for kidney biopsy  Initially in hospital stay did not improved with IV fluids and became hypoxic so now transitioning to aggressive IV diuresis  If creatinine improves with continue diuresis, consistently worsens, can reduced

## 2023-02-28 NOTE — PT/OT/SLP PROGRESS
Physical Therapy Treatment    Patient Name:  Ibrahima Phelps   MRN:  [id number]    Recommendations:     Discharge Recommendations: nursing facility, skilled (may progress to  PT)  Discharge Equipment Recommendations:  (pending progress in PT POC)  Barriers to discharge: Inaccessible home and Decreased caregiver support    Assessment:     Ibrahima Phelps is a 67 y.o. male admitted with a medical diagnosis of Acute kidney injury superimposed on CKD.  He presents with the following impairments/functional limitations: weakness, impaired endurance, impaired self care skills, impaired functional mobility, gait instability, impaired balance, decreased upper extremity function, decreased lower extremity function, decreased safety awareness, pain requiring moderate to light assistance and verbal cues for transfers and gait to prevent falls due to weakness, instability, pain, fatigue.   In light of pt's current functional level and deficits, it is anticipated that pt will need to participate in an intense rehab program consisting of PT and OT in order to achieve full rehab potential to return to previous level of function and roles.  Pt remains motivated to participate in PT session and will cont to benefit from skilled PT intervention..    Rehab Prognosis: Fair; patient would benefit from acute skilled PT services to address these deficits and reach maximum level of function.    Recent Surgery: * No surgery found *      Plan:     During this hospitalization, patient to be seen 3 x/week to address the identified rehab impairments via gait training, therapeutic activities, therapeutic exercises, neuromuscular re-education and progress toward the following goals:    Plan of Care Expires:  03/20/23    Subjective     Chief Complaint: fatigue  Patient/Family Comments/goals: walk without AD  Pain/Comfort:  Pain Rating 1: 7/10  Location - Side 1: Right  Location 1: foot  Pain Addressed 1: Pre-medicate for activity, Distraction, Cessation  of Activity, Nurse notified  Pain Rating Post-Intervention 1:  (no rating provided)      Objective:     2 attempts for tx session 2* pt with c/o 7/10 pain level at 1:43 pm    Communicated with nurse (Mady) prior to session.  Patient found HOB elevated with bed alarm, pearson catheter, oxygen, peripheral IV, pulse ox (continuous), telemetry ([facility]) upon PT entry to room.     General Precautions: Standard, fall  Orthopedic Precautions: N/A  Braces: N/A  Respiratory Status: Nasal cannula, flow 2 L/min     Functional Mobility:  Bed Mobility:     Rolling Right: stand by assistance  Supine to Sit: stand by assistance  Sit to Supine: stand by assistance  Transfers:     Sit to Stand:  contact guard assistance with IV pole for support from EOB/toilet seat  Gait: with use of IV pole for support ( pt refuses to use RW) CGA for balance and safety 14ft x2 trials  Balance: static standing while washing hands at sink with no AD CGA      AM-PAC 6 CLICK MOBILITY  Turning over in bed (including adjusting bedclothes, sheets and blankets)?: 3  Sitting down on and standing up from a chair with arms (e.g., wheelchair, bedside commode, etc.): 3  Moving from lying on back to sitting on the side of the bed?: 3  Moving to and from a bed to a chair (including a wheelchair)?: 3  Need to walk in hospital room?: 3  Climbing 3-5 steps with a railing?: 1  Basic Mobility Total Score: 16       Treatment & Education:  Patient provided with daily orientation and goals of this PT session. They were educated to call for assistance and to transfer with hospital staff only.  Also, pt was educated on the effects of prolonged immobility and the importance of performing OOB activity and exercises to promote healing and reduce recovery time    Patient left HOB elevated with all lines intact, call button in reach, bed alarm on, and nurse notified..    GOALS:   Multidisciplinary Problems       Physical Therapy Goals          Problem: Physical Therapy    Goal  Priority Disciplines Outcome Goal Variances Interventions   Physical Therapy Goal     PT, PT/OT Ongoing, Progressing     Description: Goals to be met by: 3/20/2023      Patient will increase functional independence with mobility by performin. Supine to sit with Supervision  2. Sit to supine with Supervision  3. Sit to stand transfer with Stand-by Assistance  4. Bed to chair transfer with Stand-by Assistance using Rolling Walker  5. Gait  x 100 feet with Stand-by Assistance using Rolling Walker.   6. Ascend/Descend 6 inch curb step with Contact Guard Assistance using Rolling Walker.                         Time Tracking:     PT Received On: 23  PT Start Time: 1459     PT Stop Time: 1525  PT Total Time (min): 26 min     Billable Minutes: Gait Training 15 and Therapeutic Activity 11    Treatment Type: Treatment  PT/PTA: PTA     PTA Visit Number: 1     2023

## 2023-02-28 NOTE — ASSESSMENT & PLAN NOTE
Baseline seems to be a creatinine in the 2s   Cr still elevated unchanged at 3.8 despite IVF. Scr increased to 4.0 --> 3.8  US no overly revealing; Transplant working up rejection  - nephrology consulted. apprec recs  -- followup DSA  -- off IVF. Ordered IV lasix 40 mg BID x 2 doses on 2/23. Increased to lasix gtt on 2/27  -- BMP daily

## 2023-02-28 NOTE — PROGRESS NOTES
[facility] - Intensive Care ([address] Medicine  Progress Note    Patient Name: Ibrahima Phelps  MRN: [id number]  Patient Class: IP- Inpatient   Admission Date: 2/20/2023  Length of Stay: 8 days  Attending Physician: Morris Payan MD  Primary Care Provider: Connie Magdaleno MD        Subjective:     Principal Problem:Acute kidney injury superimposed on CKD        HPI:  67-year-old black male transplant kidney patient being admitted for KATINA.  Patient was just discharged from a hospitalization for COVID pneumonia on 02/10/2023.  He also paid in ED visit a few days ago for what could have been a mild cellulitis/folliculitis on his foot for which he is almost done with his Keflex regimen.    Patient was instructed to come to the ED this time by the COVID surveillance nurses due to desaturation down to 88%.  When I talked with the patient and his wife in detail, they affirmed that he was not having any new or worsening shortness of breath since his discharge.  His wife did report that once the patient woke up/sat up his desaturation spontaneously resolved.  Patient denies any steven current shortness of breath or chest pain.  No nausea vomiting or flu fever like feeling.  In my discussion with the ED provider who was noted that the patient was complaining of shortness of breath but there was a concern that this likely was anxiety triggered.  Patient seems to be saturating well on room air here in the ED and chest x-ray which I personally reviewed is clear bilaterally.  Wife reports patient has been compliant with his medications.    The blood work did demonstrate an acutely elevated creatinine of 3.8 whereas his baseline is in the 2s.      Overview/Hospital Course:  67-year-old renal transplant black male was admitted for acute on chronic kidney injury.  Patient was just discharged from hospitalization for COVID pneumonia on 02/10/2023.  Presented to the ED per recommendations of the COVID surveillance team.   Did not have any definitive acute respiratory findings to indicate acute ongoing infectious COVID, chest x-ray was clear.  However creatinine was significantly elevated up to 3.8 whereas baseline was in the 2s.  Was started on IV fluids with trivial improvement.      Interval History: Patient lying in bed, no acute distress. Wife at bedside. Overnight, patient developed worsening hypoxia requiring BiPAP. ABG showed 7.49/31/44/24 prior to BiPAP. CXR showed pulmonary edema and patient was given IV lasix 80 mg bolus and started on lasix gtt. Once pO2 reached 118, BiPAP was removed after 5 hours and he remained stable. Hb 6.6 so patient given 1 unit pRBC. CT A/P ordered to evaluate for bleed and Eliquis held for now.       Intake/Output Summary (Last 24 hours) at 2/27/2023 2023  Last data filed at 2/27/2023 1333  Gross per 24 hour   Intake 200 ml   Output 1300 ml   Net -1100 ml           Review of Systems   Constitutional:  Positive for activity change and fatigue. Negative for chills and fever.   HENT:  Positive for voice change. Negative for congestion and trouble swallowing.    Eyes:  Negative for visual disturbance.   Respiratory:  Positive for cough and shortness of breath.    Cardiovascular:  Negative for chest pain and leg swelling.   Gastrointestinal:  Negative for abdominal distention, abdominal pain, nausea and vomiting.   Endocrine: Negative for cold intolerance, heat intolerance, polydipsia and polyuria.   Genitourinary:  Negative for difficulty urinating and dysuria.        Urinary incontinence   Musculoskeletal:  Positive for arthralgias (right ankle). Negative for back pain and myalgias.   Skin:  Negative for rash and wound.   Neurological:  Positive for weakness. Negative for dizziness and light-headedness.   Hematological:  Negative for adenopathy. Does not bruise/bleed easily.   Psychiatric/Behavioral:  Negative for confusion and sleep disturbance.      Objective:     Vital Signs (Most Recent):  Temp:  98 °F (36.7 °C) (02/27/23 1952)  Pulse: 74 (02/27/23 2000)  Resp: 20 (02/27/23 1958)  BP: (!) 150/70 (02/27/23 1952)  SpO2: 95 % (02/27/23 2000)   Vital Signs (24h Range):  Temp:  [97 °F (36.1 °C)-98.8 °F (37.1 °C)] 98 °F (36.7 °C)  Pulse:  [61-90] 74  Resp:  [18-28] 20  SpO2:  [80 %-100 %] 95 %  BP: (134-159)/(67-74) 150/70     Weight: 108.4 kg (238 lb 15.7 oz)  Body mass index is 31.53 kg/m².    Intake/Output Summary (Last 24 hours) at 2/27/2023 2023  Last data filed at 2/27/2023 1333  Gross per 24 hour   Intake 200 ml   Output 1300 ml   Net -1100 ml        Physical Exam  Constitutional:       Appearance: He is well-developed.   HENT:      Head: Normocephalic and atraumatic.      Mouth/Throat:      Mouth: Mucous membranes are moist.      Comments: hoarseness  Eyes:      General: No scleral icterus.     Extraocular Movements: Extraocular movements intact.      Pupils: Pupils are equal, round, and reactive to light.   Neck:      Vascular: No JVD.   Cardiovascular:      Rate and Rhythm: Normal rate and regular rhythm.      Heart sounds: No murmur heard.    No friction rub. No gallop.   Pulmonary:      Effort: Pulmonary effort is normal. No respiratory distress.      Breath sounds: Examination of the right-lower field reveals decreased breath sounds. Examination of the left-lower field reveals decreased breath sounds. Decreased breath sounds, wheezing and rhonchi present. No rales.   Abdominal:      General: Bowel sounds are normal. There is no distension.      Palpations: Abdomen is soft.      Tenderness: There is no abdominal tenderness.   Musculoskeletal:         General: Tenderness (right ankle) present. No deformity. Normal range of motion.      Cervical back: Neck supple.   Lymphadenopathy:      Cervical: No cervical adenopathy.   Skin:     General: Skin is warm and dry.      Capillary Refill: Capillary refill takes less than 2 seconds.      Findings: No erythema or rash.   Neurological:      General: No focal  deficit present.      Mental Status: He is alert and oriented to person, place, and time.      Cranial Nerves: No cranial nerve deficit.      Sensory: No sensory deficit.      Motor: Weakness present.   Psychiatric:         Mood and Affect: Mood normal.       Significant Labs: A1C:   Recent Labs   Lab 09/08/22  0909 01/04/23  0819   HGBA1C 5.4 5.5       CBC:   Recent Labs   Lab 02/26/23  0532 02/27/23  0023 02/27/23  0051 02/27/23  0246 02/27/23  0549 02/27/23  0623   WBC 8.01  --  10.80 10.35  --   --    HGB 7.3*  --  6.8* 6.6*  --   --    HCT 25.3*   < > 23.3* 23.1* 20* 22*     --  223 230  --   --     < > = values in this interval not displayed.       CMP:   Recent Labs   Lab 02/26/23  0532 02/27/23  0051 02/27/23  0246   * 136 138   K 4.1 4.0 4.7    102 104   CO2 18* 22* 22*   GLU 80 115* 89   BUN 48* 50* 46*   CREATININE 3.7* 3.9* 3.8*   CALCIUM 7.3* 7.6* 7.7*   PROT  --  7.3  --    ALBUMIN  --  2.2*  --    BILITOT  --  0.4  --    ALKPHOS  --  78  --    AST  --  25  --    ALT  --  20  --    ANIONGAP 13 12 12       Coagulation: No results for input(s): PT, INR, APTT in the last 48 hours.  Lactic Acid:   No results for input(s): LACTATE in the last 48 hours.        Significant Imaging: I have reviewed all pertinent imaging results/findings within the past 24 hours.      Assessment/Plan:      * Acute kidney injury superimposed on CKD  Baseline seems to be a creatinine in the 2s   Cr still elevated unchanged at 3.8 despite IVF. Scr increased to 4.0 --> 3.8  US no overly revealing; Transplant working up rejection  - nephrology consulted. apprec recs  -- followup DSA  -- off IVF. Ordered IV lasix 40 mg BID x 2 doses on 2/23. Increased to lasix gtt on 2/27  -- BMP daily           Right ankle pain  - suspect may be 2/2 gout flare  - h/o gout   - followup Xray right ankle negative   - uric acid elevated at 14.5  - colchicine 0.3 mg daily due to KATINA  - continue to monitor       Hypoxia  - continue IV  "diuresis   - weaning supplemental O2 as tolerated  - see "acute on chronic diastolic heart failure"      Anemia of renal disease  - see anemia of chronic kidney disease      Wheezing  - albuterol inhaler prn      Chronic dyspnea  -Unchanged prior to admission; Don't suspect acute covid  -CT chest w/o contrast showing BL pleural effusions; unable to perform thoracentesis due to insufficient fluid for safe procedure   - required BiPAP on 2/27 for pO2 of 44. Increased to lasix gtt  -tx underlying anemia - see below  - continue supportive care: albuterol prn, incentive spirometry, anti-tussive meds, chest PT, acapella, OOB to chair  -covid + on 2/7/23      Cellulitis of foot  - completed 7 days of Keflex (end date: 2/27)    Hypocalcemia  - replete prn      Anemia due to stage 4 chronic kidney disease  - required transfusion on 2/22 and 2/27  - followup CT A/P to evaluate for bleed  - holding home Eliquis for now. Resume if no signs of bleeding  - followup stool occult  - CBC daily       Acute on chronic diastolic heart failure  - Initially held home dosing of p.r.n. Lasix given KATINA and did trial of IVF but no improvement in Scr  - nephrology following   - started trial of IV lasix on 2/23 with improvement in Scr. Started on lasix gtt on 2/27  - continue home metoprolol  - followup BMP    Paroxysmal atrial fibrillation  Continue home Eliquis, flecainide, metoprolol        H/O kidney transplant  [facility] transplant service following  Routine prograf levels  Continue home Prograf 2 mg b.i.d.  Continue home prednisone  - can restart on  BID at the time of discharge      CAD (coronary artery disease)  History of stenting years ago   Continue home aspirin and statin, Toprol      Hypertension  Continue home metoprolol, nifedipine, hydralazine      Long-term use of immunosuppressant medication  - see "kidney transplant"        VTE Risk Mitigation (From admission, onward)         Ordered     Place sequential compression device  " Until discontinued         02/27/23 1212                Discharge Planning   RAMU: 2/28/2023     Code Status: Full Code   Is the patient medically ready for discharge?: No    Reason for patient still in hospital (select all that apply): Patient unstable, Patient trending condition, Laboratory test, Treatment, Consult recommendations, PT / OT recommendations and Pending disposition  Discharge Plan A: Home with family          Time spent in care of patient: > 35 minutes         Morris Payan MD  Department of Hospital Medicine   [facility] - Intensive Care ([id number])

## 2023-02-28 NOTE — PROGRESS NOTES
[facility] - Intensive Care ([facility]-)  Kidney Transplant  Progress Note      Reason for Follow-up: Reassessment of Kidney Transplant - 4/12/2005 - Not Followed  (#2) recipient and management of immunosuppression.    ORGAN: LEFT KIDNEY    Donor Type:           Subjective:   History of Present Illness:  67 year old man with past medical history of kidney transplant times 2 first one in 1992 and then again in 2005. Has CKD with baseline creatinine high 2s. Recently admitted earlier this month with COVID. Was admitted with KATINA with cr 3.8 on admit. Last admission when he was admitted with COVID his cr did peak at 3.8 but when he was on 2/10 his cr was 2.6 at baseline. Patient denies any diarrhea after discharge from hospital. Says that he has a hard time urinating but no dysuria, no hematuria , no flank pain , no fevers. Was on keflex for mild cellulitis. Denies NSAID use. This morning patient was wheezing and had some SOB. He was started on IVF NS with no improvement in scr this morning was 3.8    Mr. Phelps is a 67 y.o. year old male who is status post Kidney Transplant - 4/12/2005 - Not Followed  (#2).    His maintenance immunosuppression consists of:   Immunosuppressants (From admission, onward)      Start     Stop Route Frequency Ordered    02/25/23 1800  tacrolimus capsule 2.5 mg         -- Oral 2 times daily 02/25/23 1017            Hospital Course:  No notes on file    Interval History:  Mentation improved today. Creatinine stable. Discussed that kidney is failing and that he would not benefit from biopsy. Discussed progression to dialysis in future and patient did not want dialysis.    Past Medical, Surgical, Family, and Social History:   Unchanged from H&P.    Scheduled Meds:   acetaminophen  1,000 mg Oral TID    albuterol-ipratropium  3 mL Nebulization Q4H WAKE    aspirin  81 mg Oral Daily    atorvastatin  40 mg Oral QHS    calcium acetate(phosphat bind)  1,334 mg Oral TID WM    chlorothiazide  (DIURIL) IVPB  500 mg Intravenous BID    colchicine  0.3 mg Oral Daily    colchicine  0.3 mg Oral Daily    divalproex  250 mg Oral QHS    doxazosin  4 mg Oral Q12H    epoetin samara (PROCRIT) injection  20,000 Units Subcutaneous Q7 Days    famotidine  20 mg Oral Daily    fluticasone furoate-vilanteroL  1 puff Inhalation Daily    gabapentin  100 mg Oral QHS    hydrALAZINE  50 mg Oral TID    metoprolol succinate  25 mg Oral Daily    NIFEdipine  60 mg Oral BID    paricalcitoL  1 mcg Oral Daily    predniSONE  5 mg Oral Daily    sodium bicarbonate  1,300 mg Oral Q8H    tacrolimus  2.5 mg Oral BID     Continuous Infusions:   furosemide (LASIX) 10 mg/mL infusion (non-titrating) 15 mg/hr (02/28/23 1016)     PRN Meds:sodium chloride, sodium chloride, albuterol, albuterol-ipratropium, clonazePAM, melatonin, traMADoL    Intake/Output - Last 3 Shifts         02/26 0700 02/27 0659 02/27 0700 02/28 0659 02/28 0700 03/01 0659    P.O. 720 560     Total Intake(mL/kg) 720 (6.6) 560 (5.2)     Urine (mL/kg/hr) 800 (0.3) 1600 (0.6)     Stool  0     Total Output 800 1600     Net -80 -1040            Stool Occurrence 0 x 0 x              Review of Systems   Constitutional:  Positive for fatigue.   HENT: Negative.     Eyes: Negative.    Respiratory: Negative.     Cardiovascular: Negative.    Gastrointestinal: Negative.    Endocrine: Negative.    Genitourinary: Negative.    Musculoskeletal: Negative.    Skin: Negative.    Neurological:  Positive for tremors and weakness.   Psychiatric/Behavioral: Negative.      Objective:     Vital Signs (Most Recent):  Temp: 98.6 °F (37 °C) (02/28/23 1246)  Pulse: 84 (02/28/23 1246)  Resp: 20 (02/28/23 1246)  BP: (!) 142/66 (02/28/23 1246)  SpO2: 95 % (02/28/23 1246)   Vital Signs (24h Range):  Temp:  [97.5 °F (36.4 °C)-98.8 °F (37.1 °C)] 98.6 °F (37 °C)  Pulse:  [66-86] 84  Resp:  [18-20] 20  SpO2:  [92 %-98 %] 95 %  BP: (127-154)/(58-74) 142/66     Weight: 108.4 kg (238 lb 15.7  "oz)  Height: 6' 1" (185.4 cm)  Body mass index is 31.53 kg/m².    Physical Exam  Vitals and nursing note reviewed.   Constitutional:       General: He is not in acute distress.     Appearance: He is obese. He is ill-appearing.   Eyes:      General: No scleral icterus.  Cardiovascular:      Rate and Rhythm: Normal rate.   Pulmonary:      Effort: Pulmonary effort is normal. No respiratory distress.   Abdominal:      General: There is no distension.      Palpations: Abdomen is soft.   Musculoskeletal:      Right lower leg: No edema.      Left lower leg: No edema.   Skin:     Coloration: Skin is not jaundiced.   Neurological:      Mental Status: He is alert.       Laboratory:  Labs within the past 24 hours have been reviewed.    Diagnostic Results:  None    Assessment/Plan:     * Acute kidney injury superimposed on CKD  See kidney transplant       Anemia of renal disease  Patient denies any bleeding no melena nor bleeding per rectum   Being transfused today per primary team   On epo weekly now      Wheezing  CT chest with pulmonary edema and possible pneumonia  Management per primary    Cellulitis of foot  On keflex per primary team       Hypocalcemia  Improved   Replete calcium per primary       H/O kidney transplant  trasnsplant kidney times 2 first one was in 1992 and second one was 2005 patient on prograf 2 mg BID and prednisone 5 mg po qday   KATINA on CKD, most consistent with progressive failing kidney transplant  baseline cr is high 2.6-2.8.   Non oliguric   UA bland   Transplant renal US no hydro   No plan for kidney biopsy  Initially in hospital stay did not improved with IV fluids and became hypoxic so now transitioning to aggressive IV diuresis  If creatinine improves with continue diuresis, consistently worsens, can reduced    Long-term use of immunosuppressant medication  On FK 2.5 mg bid.  On prednisone 5 mg po qday   Monitor for toxicity        Please consult palliative care given patient wishes to not ever " want to be on dialysis again and his high risk of needing dialysis in near future.    Jeff Diaz Jr., MD  Kidney Transplant  [facility] - Intensive Care ([facility]

## 2023-02-28 NOTE — SUBJECTIVE & OBJECTIVE
Interval History: Patient lying in bed, no acute distress. Wife at bedside. Overnight, patient developed worsening hypoxia requiring BiPAP. ABG showed 7.49/31/44/24 prior to BiPAP. CXR showed pulmonary edema and patient was given IV lasix 80 mg bolus and started on lasix gtt. Once pO2 reached 118, BiPAP was removed after 5 hours and he remained stable. Hb 6.6 so patient given 1 unit pRBC. CT A/P ordered to evaluate for bleed and Eliquis held for now.       Intake/Output Summary (Last 24 hours) at 2/27/2023 2023  Last data filed at 2/27/2023 1333  Gross per 24 hour   Intake 200 ml   Output 1300 ml   Net -1100 ml           Review of Systems   Constitutional:  Positive for activity change and fatigue. Negative for chills and fever.   HENT:  Positive for voice change. Negative for congestion and trouble swallowing.    Eyes:  Negative for visual disturbance.   Respiratory:  Positive for cough and shortness of breath.    Cardiovascular:  Negative for chest pain and leg swelling.   Gastrointestinal:  Negative for abdominal distention, abdominal pain, nausea and vomiting.   Endocrine: Negative for cold intolerance, heat intolerance, polydipsia and polyuria.   Genitourinary:  Negative for difficulty urinating and dysuria.        Urinary incontinence   Musculoskeletal:  Positive for arthralgias (right ankle). Negative for back pain and myalgias.   Skin:  Negative for rash and wound.   Neurological:  Positive for weakness. Negative for dizziness and light-headedness.   Hematological:  Negative for adenopathy. Does not bruise/bleed easily.   Psychiatric/Behavioral:  Negative for confusion and sleep disturbance.      Objective:     Vital Signs (Most Recent):  Temp: 98 °F (36.7 °C) (02/27/23 1952)  Pulse: 74 (02/27/23 2000)  Resp: 20 (02/27/23 1958)  BP: (!) 150/70 (02/27/23 1952)  SpO2: 95 % (02/27/23 2000)   Vital Signs (24h Range):  Temp:  [97 °F (36.1 °C)-98.8 °F (37.1 °C)] 98 °F (36.7 °C)  Pulse:  [61-90] 74  Resp:  [18-28]  20  SpO2:  [80 %-100 %] 95 %  BP: (134-159)/(67-74) 150/70     Weight: 108.4 kg (238 lb 15.7 oz)  Body mass index is 31.53 kg/m².    Intake/Output Summary (Last 24 hours) at 2/27/2023 2023  Last data filed at 2/27/2023 1333  Gross per 24 hour   Intake 200 ml   Output 1300 ml   Net -1100 ml        Physical Exam  Constitutional:       Appearance: He is well-developed.   HENT:      Head: Normocephalic and atraumatic.      Mouth/Throat:      Mouth: Mucous membranes are moist.      Comments: hoarseness  Eyes:      General: No scleral icterus.     Extraocular Movements: Extraocular movements intact.      Pupils: Pupils are equal, round, and reactive to light.   Neck:      Vascular: No JVD.   Cardiovascular:      Rate and Rhythm: Normal rate and regular rhythm.      Heart sounds: No murmur heard.    No friction rub. No gallop.   Pulmonary:      Effort: Pulmonary effort is normal. No respiratory distress.      Breath sounds: Examination of the right-lower field reveals decreased breath sounds. Examination of the left-lower field reveals decreased breath sounds. Decreased breath sounds, wheezing and rhonchi present. No rales.   Abdominal:      General: Bowel sounds are normal. There is no distension.      Palpations: Abdomen is soft.      Tenderness: There is no abdominal tenderness.   Musculoskeletal:         General: Tenderness (right ankle) present. No deformity. Normal range of motion.      Cervical back: Neck supple.   Lymphadenopathy:      Cervical: No cervical adenopathy.   Skin:     General: Skin is warm and dry.      Capillary Refill: Capillary refill takes less than 2 seconds.      Findings: No erythema or rash.   Neurological:      General: No focal deficit present.      Mental Status: He is alert and oriented to person, place, and time.      Cranial Nerves: No cranial nerve deficit.      Sensory: No sensory deficit.      Motor: Weakness present.   Psychiatric:         Mood and Affect: Mood normal.        Significant Labs: A1C:   Recent Labs   Lab 09/08/22  0909 01/04/23  0819   HGBA1C 5.4 5.5       CBC:   Recent Labs   Lab 02/26/23  0532 02/27/23  0023 02/27/23  0051 02/27/23  0246 02/27/23  0549 02/27/23  0623   WBC 8.01  --  10.80 10.35  --   --    HGB 7.3*  --  6.8* 6.6*  --   --    HCT 25.3*   < > 23.3* 23.1* 20* 22*     --  223 230  --   --     < > = values in this interval not displayed.       CMP:   Recent Labs   Lab 02/26/23  0532 02/27/23 0051 02/27/23  0246   * 136 138   K 4.1 4.0 4.7    102 104   CO2 18* 22* 22*   GLU 80 115* 89   BUN 48* 50* 46*   CREATININE 3.7* 3.9* 3.8*   CALCIUM 7.3* 7.6* 7.7*   PROT  --  7.3  --    ALBUMIN  --  2.2*  --    BILITOT  --  0.4  --    ALKPHOS  --  78  --    AST  --  25  --    ALT  --  20  --    ANIONGAP 13 12 12       Coagulation: No results for input(s): PT, INR, APTT in the last 48 hours.  Lactic Acid:   No results for input(s): LACTATE in the last 48 hours.        Significant Imaging: I have reviewed all pertinent imaging results/findings within the past 24 hours.

## 2023-02-28 NOTE — ASSESSMENT & PLAN NOTE
Patient denies any bleeding no melena nor bleeding per rectum   Being transfused today per primary team   On epo weekly now

## 2023-02-28 NOTE — NURSING
Notified by pt wife that patient fell trying to get out of bed to have BM, pt noted to be holding onto rail with knees on ground, wife stated he had not hit his head, VSS,[id number], md notified, CT of head ordered.

## 2023-02-28 NOTE — SUBJECTIVE & OBJECTIVE
Subjective:   History of Present Illness:  67 year old man with past medical history of kidney transplant times 2 first one in 1992 and then again in 2005. Has CKD with baseline creatinine high 2s. Recently admitted earlier this month with COVID. Was admitted with KATINA with cr 3.8 on admit. Last admission when he was admitted with COVID his cr did peak at 3.8 but when he was on 2/10 his cr was 2.6 at baseline. Patient denies any diarrhea after discharge from hospital. Says that he has a hard time urinating but no dysuria, no hematuria , no flank pain , no fevers. Was on keflex for mild cellulitis. Denies NSAID use. This morning patient was wheezing and had some SOB. He was started on IVF NS with no improvement in scr this morning was 3.8    Mr. Phelps is a 67 y.o. year old male who is status post Kidney Transplant - 4/12/2005 - Not Followed  (#2).    His maintenance immunosuppression consists of:   Immunosuppressants (From admission, onward)      Start     Stop Route Frequency Ordered    02/25/23 1800  tacrolimus capsule 2.5 mg         -- Oral 2 times daily 02/25/23 1017            Hospital Course:  No notes on file    Interval History:  Mentation improved today. Creatinine stable. Discussed that kidney is failing and that he would not benefit from biopsy. Discussed progression to dialysis in future and patient did not want dialysis.    Past Medical, Surgical, Family, and Social History:   Unchanged from H&P.    Scheduled Meds:   acetaminophen  1,000 mg Oral TID    albuterol-ipratropium  3 mL Nebulization Q4H WAKE    aspirin  81 mg Oral Daily    atorvastatin  40 mg Oral QHS    calcium acetate(phosphat bind)  1,334 mg Oral TID WM    chlorothiazide (DIURIL) IVPB  500 mg Intravenous BID    colchicine  0.3 mg Oral Daily    colchicine  0.3 mg Oral Daily    divalproex  250 mg Oral QHS    doxazosin  4 mg Oral Q12H    epoetin samara (PROCRIT) injection  20,000 Units Subcutaneous Q7 Days    famotidine  20 mg Oral Daily     "fluticasone furoate-vilanteroL  1 puff Inhalation Daily    gabapentin  100 mg Oral QHS    hydrALAZINE  50 mg Oral TID    metoprolol succinate  25 mg Oral Daily    NIFEdipine  60 mg Oral BID    paricalcitoL  1 mcg Oral Daily    predniSONE  5 mg Oral Daily    sodium bicarbonate  1,300 mg Oral Q8H    tacrolimus  2.5 mg Oral BID     Continuous Infusions:   furosemide (LASIX) 10 mg/mL infusion (non-titrating) 15 mg/hr (02/28/23 1016)     PRN Meds:sodium chloride, sodium chloride, albuterol, albuterol-ipratropium, clonazePAM, melatonin, traMADoL    Intake/Output - Last 3 Shifts         02/26 0700  02/27 0659 02/27 0700 02/28 0659 02/28 0700 03/01 0659    P.O. 720 560     Total Intake(mL/kg) 720 (6.6) 560 (5.2)     Urine (mL/kg/hr) 800 (0.3) 1600 (0.6)     Stool  0     Total Output 800 1600     Net -80 -1040            Stool Occurrence 0 x 0 x              Review of Systems   Constitutional:  Positive for fatigue.   HENT: Negative.     Eyes: Negative.    Respiratory: Negative.     Cardiovascular: Negative.    Gastrointestinal: Negative.    Endocrine: Negative.    Genitourinary: Negative.    Musculoskeletal: Negative.    Skin: Negative.    Neurological:  Positive for tremors and weakness.   Psychiatric/Behavioral: Negative.      Objective:     Vital Signs (Most Recent):  Temp: 98.6 °F (37 °C) (02/28/23 1246)  Pulse: 84 (02/28/23 1246)  Resp: 20 (02/28/23 1246)  BP: (!) 142/66 (02/28/23 1246)  SpO2: 95 % (02/28/23 1246)   Vital Signs (24h Range):  Temp:  [97.5 °F (36.4 °C)-98.8 °F (37.1 °C)] 98.6 °F (37 °C)  Pulse:  [66-86] 84  Resp:  [18-20] 20  SpO2:  [92 %-98 %] 95 %  BP: (127-154)/(58-74) 142/66     Weight: 108.4 kg (238 lb 15.7 oz)  Height: 6' 1" (185.4 cm)  Body mass index is 31.53 kg/m².    Physical Exam  Vitals and nursing note reviewed.   Constitutional:       General: He is not in acute distress.     Appearance: He is obese. He is ill-appearing.   Eyes:      General: No scleral icterus.  Cardiovascular:      Rate " and Rhythm: Normal rate.   Pulmonary:      Effort: Pulmonary effort is normal. No respiratory distress.   Abdominal:      General: There is no distension.      Palpations: Abdomen is soft.   Musculoskeletal:      Right lower leg: No edema.      Left lower leg: No edema.   Skin:     Coloration: Skin is not jaundiced.   Neurological:      Mental Status: He is alert.       Laboratory:  Labs within the past 24 hours have been reviewed.    Diagnostic Results:  None

## 2023-02-28 NOTE — MEDICAL/APP STUDENT
Progress Note  Hospital Medicine    Patient Name: Ibrahima Phelps  YOB: 1955    Admit Date: 2/20/2023                     LOS: 8    SUBJECTIVE:     Reason for Admission:  Acute kidney injury superimposed on CKD    HPI:  67-year-old black male transplant kidney patient being admitted for KATINA.  Patient was just discharged from a hospitalization for COVID pneumonia on 02/10/2023.  He also paid in ED visit a few days ago for what could have been a mild cellulitis/folliculitis on his foot for which he is almost done with his Keflex regimen.    Patient was instructed to come to the ED this time by the COVID surveillance nurses due to desaturation down to 88%.  When I talked with the patient and his wife in detail, they affirmed that he was not having any new or worsening shortness of breath since his discharge.  His wife did report that once the patient woke up/sat up his desaturation spontaneously resolved.  Patient denies any steven current shortness of breath or chest pain.  No nausea vomiting or flu fever like feeling.  In my discussion with the ED provider who was noted that the patient was complaining of shortness of breath but there was a concern that this likely was anxiety triggered.  Patient seems to be saturating well on room air here in the ED and chest x-ray which I personally reviewed is clear bilaterally.  Wife reports patient has been compliant with his medications.    The blood work did demonstrate an acutely elevated creatinine of 3.8 whereas his baseline is in the 2s.    Hospital Course:  67-year-old renal transplant black male was admitted for acute on chronic kidney injury.  Patient was just discharged from hospitalization for COVID pneumonia on 02/10/2023.  Presented to the ED per recommendations of the COVID surveillance team.  Did not have any definitive acute respiratory findings to indicate acute ongoing infectious COVID, chest x-ray was clear.  However creatinine was significantly  elevated up to 3.8 whereas baseline was in the 2s.  Was started on IV fluids with trivial improvement.    Interval history: According to the overnight note, the patient experienced an unwitnessed fall. However, botht he patient and wife state he did not fall but rather got tangled in the wires. CT head non con was ordered for the AM, still waiting for CT Abdo/pelvis non con from 27-Feb. PT/OT consulted recommend 3x week therapy while in hospital. Transplant consults, recommend continuing home meds, pushing up the diuresis (15 mg/hr Lasix drip and 500 mg BID chlorothiazide), started this morning.    Review of Systems:  Review of Systems   Constitutional:  Positive for activity change (difficulty ambulating) and fatigue (staying in bed mostly). Negative for appetite change and fever.   Respiratory:  Negative for apnea, cough, chest tightness, shortness of breath and wheezing.    Cardiovascular:  Negative for chest pain, palpitations, leg swelling and claudication.   Gastrointestinal:  Negative for abdominal distention, abdominal pain, constipation, diarrhea, nausea and vomiting.   Genitourinary:  Negative for decreased urine volume, difficulty urinating and dysuria.   Musculoskeletal:  Positive for joint swelling (Joint pain as well R ankle, but improved compared to yesterday).   Neurological:  Negative for dizziness, light-headedness and headaches.   Psychiatric/Behavioral:  Negative for agitation, behavioral problems, confusion and decreased concentration. The patient is not nervous/anxious.      OBJECTIVE:     Vitals:    02/28/23 0331 02/28/23 0523 02/28/23 0734 02/28/23 0835   BP:  (!) 127/58  (!) 154/74   BP Location:       Patient Position:    Lying   Pulse: 66  74 78   Resp:  19 18 20   Temp:  97.6 °F (36.4 °C)  97.5 °F (36.4 °C)   TempSrc:  Oral  Oral   SpO2:   (!) 94% (!) 92%   Weight:       Height:         Was Stating 98% O2 on 2L NC when I was in the room  HR 73, RR 18      Vital Signs Range (Last  24H):  Temp:  [97 °F (36.1 °C)-98.8 °F (37.1 °C)]   Pulse:  [66-86]   Resp:  [18-20]   BP: (127-159)/(58-74)   SpO2:  [92 %-98 %]     Body mass index is 31.53 kg/m².    Wt Readings from Last 3 Encounters:   02/27/23 0648 108.4 kg (238 lb 15.7 oz)   02/20/23 1158 101.6 kg (224 lb)   02/10/23 0513 101.6 kg (224 lb)   02/07/23 2305 101.9 kg (224 lb 10.4 oz)   02/07/23 1102 107.5 kg (237 lb)   01/12/23 1048 103.6 kg (228 lb 6.3 oz)       Physical Exam:  Physical Exam  Vitals and nursing note reviewed. Exam conducted with a chaperone present.   Constitutional:       Appearance: He is obese.      Interventions: Nasal cannula in place.      Comments: 2L O2   HENT:      Head: Normocephalic.   Eyes:      Extraocular Movements: Extraocular movements intact.      Pupils: Pupils are equal, round, and reactive to light.   Neck:      Vascular: No JVD.   Cardiovascular:      Rate and Rhythm: Normal rate and regular rhythm.      Pulses: Normal pulses.      Heart sounds: Normal heart sounds.   Pulmonary:      Effort: Pulmonary effort is normal.      Breath sounds: Examination of the right-lower field reveals decreased breath sounds. Decreased breath sounds present.   Abdominal:      General: Bowel sounds are normal.      Palpations: Abdomen is soft.      Tenderness: There is no abdominal tenderness.   Musculoskeletal:      Right lower leg: No edema.      Left lower leg: No edema.      Right ankle: Tenderness present.      Comments: Slight flapping tremors both hands   Neurological:      General: No focal deficit present.      Mental Status: He is alert and oriented to person, place, and time.   Psychiatric:         Mood and Affect: Mood normal.         Behavior: Behavior normal.         Judgment: Judgment normal.       I & O (Last 24H):  Intake/Output Summary (Last 24 hours) at 2/28/2023 0854  Last data filed at 2/28/2023 0524  Gross per 24 hour   Intake 560 ml   Output 1100 ml   Net -540 ml       Significant Labs:    CBC  Recent  Labs   Lab 02/27/23  0051 02/27/23  0246 02/27/23  0549 02/27/23  0623 02/28/23  0440   WBC 10.80 10.35  --   --  8.76   HGB 6.8* 6.6*  --   --  7.3*   HCT 23.3* 23.1* 20* 22* 24.1*    230  --   --  231       CMP  Recent Labs   Lab 02/23/23  0808 02/24/23  0819 02/25/23  1402 02/26/23  0532 02/27/23  0051 02/27/23  0246 02/28/23  0440   * 137   < > 135* 136 138 140   K 4.3 4.1   < > 4.1 4.0 4.7 4.2    106   < > 104 102 104 104   CO2 18* 19*   < > 18* 22* 22* 22*   ANIONGAP 11 12   < > 13 12 12 14   BUN 41* 41*   < > 48* 50* 46* 48*   CREATININE 4.0* 3.8*   < > 3.7* 3.9* 3.8* 3.7*    95   < > 80 115* 89 84   CALCIUM 6.7* 7.2*   < > 7.3* 7.6* 7.7* 7.8*   MG  --   --    < > 2.3 2.3 2.5  --    PHOS 6.1* 6.4*   < > 6.2* 5.9* 6.2*  --    ALKPHOS  --   --   --   --  78  --   --    ALT  --   --   --   --  20  --   --    AST  --   --   --   --  25  --   --    ALBUMIN 2.1* 2.2*  --   --  2.2*  --   --    PROT  --   --   --   --  7.3  --   --    BILITOT  --   --   --   --  0.4  --   --     < > = values in this interval not displayed.      Lab Results   Component Value Date    INR 1.2 02/07/2023    INR 1.1 03/21/2022    INR 1.2 06/17/2020    INR 1.2 06/17/2020     Lab Results   Component Value Date    HGBA1C 5.5 01/04/2023     No results for input(s): POCTGLUCOSE in the last 72 hours.    Urinalysis  No results for input(s): COLORU, CLARITYU, SPECGRAV, PHUR, PROTEINUA, GLUCOSEU, BILIRUBINCON, BLOODU, WBCU, RBCU, BACTERIA, MUCUS, NITRITE, LEUKOCYTESUR, UROBILINOGEN, HYALINECASTS in the last 24 hours.     Microbiology  Microbiology Results (last 7 days)       Procedure Component Value Units Date/Time    Respiratory Infection Panel (PCR), Nasopharyngeal [[id number]]     Order Status: No result Specimen: Nasopharyngeal Swab     Aerobic culture [[id number]]     Order Status: No result Specimen: Pleural Fluid     Culture, Anaerobic [[id number]]     Order Status: No result Specimen: Pleural Fluid     Gram stain  [[id number]]     Order Status: No result Specimen: Pleural Fluid     AFB Culture & Smear [[id number]]     Order Status: No result Specimen: Pleural Fluid              Imaging  Imaging Results              X-Ray Chest AP Portable (Final result)  Result time 02/20/23 12:35:47      Final result by Byron Nguyen MD (02/20/23 12:35:47)                   Impression:      See above      Electronically signed by: Bryon Nguyen MD  Date:    02/20/2023  Time:    12:35               Narrative:    EXAMINATION:  XR CHEST AP PORTABLE    CLINICAL HISTORY:  COVID-19;    TECHNIQUE:  Single frontal view of the chest was performed.    COMPARISON:  No 02/07/2023 ne    FINDINGS:  Heart size normal.  No significant airspace consolidation or pleural effusion identified                                         ASSESSMENT/PLAN:     Mr. Ibrahima Phelps is a 67 y.o. male with a kidney transplant currently on immunosuppressants and CKD4, HTN, CAD, pAF, HFpEF, anemia of renal disease, recently discharge 2/2 COVID pneumonia 02/10. Presented with oxygen desaturation to 88%, but was not having worsened dyspnea or SOB.    1. Long-term use of immunosuppressant medication    2. SOB (shortness of breath)    3. COVID-19    4. Atrial fibrillation    5. H/O kidney transplant    6. Hypoxia    7. Acute kidney injury superimposed on CKD    8. Shortness of breath        # Acute kidney injury superimposed on CKD  Mr. Phelps is a 66 yo man with a kidney transplant currently on immunosuppressants and CKD4, HTN, CAD, pAF, HFpEF, anemia of renal disease, recently discharge 2/2 COVID pneumonia 02/10. Presented with oxygen desaturation to 88%, but was not having worsened dyspnea or SOB.  - recent labs show elevated BUN and Cr (48/3.7 both consistent with previous CMP), average 2.8  - anemia of renal disease evident H/H 7.3/24.1  - After discussion with kidney transplant, aim to hav 3-4 L UOP within 24 hours, increase Lasix drip to 15 mg/h and add 500 mg BID  chlorothiazide. Potentially push the kidney if this is due to fluid overload; otherwise, patient might be looking at dialysis     PLAN:  [  ] continue IVF  [  ] daily BMP  [  ] continue home immunosuppressant meds  [  ] Epoetin samara injection 20,000 U Q7d  [  ] Lasix drip 15 mg/h  [  ] chlorothiazide 500 mg BID  [  ] figure out UOP tomorrow morning, and further discuss the need for dialysis?      # Fall overnight  - notes indicate the patient had an unwitnessed fall. Both the patient and his wife say it was not a fall, he got tangled in the wires when trying to get out of bed.  - CT Head non con ordered    # hypoxic event overnight  - patient oxygen sat dropped into the 60s, placed on breathing treatment per RR, Sat O2 80s (26-27 Feb)  - suspected fluid overload to be the cause of event, CXR showing decreased R hemidiaphragm angle, possible pleural effusion  - 27 Feb ABG AM pH 7.472, pCO2 35.9, PO2 118, pHCo3 26.2 (improved compared to yesterday 7.483/32.5/47/24.4)    PLAN:  [  ] Lasix drip 15 mg/h (500 mg/24h)    # H/o Kidney transplant  - see KATINA on CKD    # Wheezing  - albuterol PRN    # anemia of renal disease  - see KATINA on CKD  - assess for potential underlying bleed, due to low H/H, CT Abdomen and pelvis ordered    # HFpEF  - daily weight, I/Os, continue home metoprolol, daily BNP  - Lasix 80 mg now infusion 10 mg/h (500 mg/24h)    # pAF  - home meds eliquis, metoprolol    # CAD  - home statin and aspirin, metoprolol    # HTN  - continue metoprolol, nifedipine, and hydralazine    # DVT Ppx  - Eliquis 5 mg      VTE Risk Mitigation (From admission, onward)           Ordered     Place sequential compression device  Until discontinued         02/27/23 1212                      DISCHARGE PLANNING:  Discharge Planning   RAMU: 2/28/2023     Code Status: Full Code   Is the patient medically ready for discharge?: No    Reason for patient still in hospital (select all that apply): Patient trending condition, Laboratory  test, Imaging, Consult recommendations, and PT / OT recommendations  Discharge Plan A: Home with family          Signing Physician:  Malik Welch MS4  [facility] [place] [facility]OSP MED R

## 2023-02-28 NOTE — ASSESSMENT & PLAN NOTE
- suspect may be 2/2 gout flare  - h/o gout   - followup Xray right ankle negative   - uric acid elevated at 14.5  - colchicine 0.3 mg daily due to KATINA  - continue to monitor

## 2023-02-28 NOTE — ASSESSMENT & PLAN NOTE
-Unchanged prior to admission; Don't suspect acute covid  -CT chest w/o contrast showing BL pleural effusions; unable to perform thoracentesis due to insufficient fluid for safe procedure   - required BiPAP on 2/27 for pO2 of 44. Increased to lasix gtt  -tx underlying anemia - see below  - continue supportive care: albuterol prn, incentive spirometry, anti-tussive meds, chest PT, acapella, OOB to chair  -covid + on 2/7/23

## 2023-02-28 NOTE — ASSESSMENT & PLAN NOTE
- Initially held home dosing of p.r.n. Lasix given KTAINA and did trial of IVF but no improvement in Scr  - nephrology following   - started trial of IV lasix on 2/23 with improvement in Scr. Started on lasix gtt on 2/27  - continue home metoprolol  - followup BMP

## 2023-03-01 PROBLEM — Z51.5 PALLIATIVE CARE ENCOUNTER: Status: ACTIVE | Noted: 2023-03-01

## 2023-03-01 PROBLEM — Z71.89 GOALS OF CARE, COUNSELING/DISCUSSION: Status: ACTIVE | Noted: 2023-03-01

## 2023-03-01 PROBLEM — Z71.89 ADVANCE CARE PLANNING: Status: ACTIVE | Noted: 2023-03-01

## 2023-03-01 LAB
ANION GAP SERPL CALC-SCNC: 12 MMOL/L (ref 8–16)
BUN SERPL-MCNC: 51 MG/DL (ref 8–23)
CALCIUM SERPL-MCNC: 8.1 MG/DL (ref 8.7–10.5)
CHLORIDE SERPL-SCNC: 102 MMOL/L (ref 95–110)
CO2 SERPL-SCNC: 28 MMOL/L (ref 23–29)
CREAT SERPL-MCNC: 3.9 MG/DL (ref 0.5–1.4)
ERYTHROCYTE [DISTWIDTH] IN BLOOD BY AUTOMATED COUNT: 15.9 % (ref 11.5–14.5)
EST. GFR  (NO RACE VARIABLE): 16 ML/MIN/1.73 M^2
GLUCOSE SERPL-MCNC: 83 MG/DL (ref 70–110)
HCT VFR BLD AUTO: 25.7 % (ref 40–54)
HGB BLD-MCNC: 7.9 G/DL (ref 14–18)
MCH RBC QN AUTO: 24.6 PG (ref 27–31)
MCHC RBC AUTO-ENTMCNC: 30.7 G/DL (ref 32–36)
MCV RBC AUTO: 80 FL (ref 82–98)
OB PNL STL: NEGATIVE
PLATELET # BLD AUTO: 259 K/UL (ref 150–450)
PMV BLD AUTO: 11.6 FL (ref 9.2–12.9)
POTASSIUM SERPL-SCNC: 3.7 MMOL/L (ref 3.5–5.1)
RBC # BLD AUTO: 3.21 M/UL (ref 4.6–6.2)
SODIUM SERPL-SCNC: 142 MMOL/L (ref 136–145)
TACROLIMUS BLD-MCNC: 9.5 NG/ML (ref 5–15)
WBC # BLD AUTO: 7.38 K/UL (ref 3.9–12.7)

## 2023-03-01 PROCEDURE — 80197 ASSAY OF TACROLIMUS: CPT | Performed by: HOSPITALIST

## 2023-03-01 PROCEDURE — 94640 AIRWAY INHALATION TREATMENT: CPT

## 2023-03-01 PROCEDURE — 25000003 PHARM REV CODE 250: Performed by: HOSPITALIST

## 2023-03-01 PROCEDURE — 12000002 HC ACUTE/MED SURGE SEMI-PRIVATE ROOM

## 2023-03-01 PROCEDURE — 25000242 PHARM REV CODE 250 ALT 637 W/ HCPCS: Performed by: HOSPITALIST

## 2023-03-01 PROCEDURE — 63600175 PHARM REV CODE 636 W HCPCS: Performed by: INTERNAL MEDICINE

## 2023-03-01 PROCEDURE — 85027 COMPLETE CBC AUTOMATED: CPT | Performed by: INTERNAL MEDICINE

## 2023-03-01 PROCEDURE — 25000003 PHARM REV CODE 250: Performed by: INTERNAL MEDICINE

## 2023-03-01 PROCEDURE — 99233 SBSQ HOSP IP/OBS HIGH 50: CPT | Mod: ,,, | Performed by: INTERNAL MEDICINE

## 2023-03-01 PROCEDURE — 99233 PR SUBSEQUENT HOSPITAL CARE,LEVL III: ICD-10-PCS | Mod: ,,, | Performed by: INTERNAL MEDICINE

## 2023-03-01 PROCEDURE — 94664 DEMO&/EVAL PT USE INHALER: CPT

## 2023-03-01 PROCEDURE — 36415 COLL VENOUS BLD VENIPUNCTURE: CPT | Performed by: INTERNAL MEDICINE

## 2023-03-01 PROCEDURE — 82272 OCCULT BLD FECES 1-3 TESTS: CPT | Performed by: INTERNAL MEDICINE

## 2023-03-01 PROCEDURE — 94761 N-INVAS EAR/PLS OXIMETRY MLT: CPT

## 2023-03-01 PROCEDURE — 25000242 PHARM REV CODE 250 ALT 637 W/ HCPCS: Performed by: INTERNAL MEDICINE

## 2023-03-01 PROCEDURE — 99900035 HC TECH TIME PER 15 MIN (STAT)

## 2023-03-01 PROCEDURE — 97530 THERAPEUTIC ACTIVITIES: CPT

## 2023-03-01 PROCEDURE — 99223 PR INITIAL HOSPITAL CARE,LEVL III: ICD-10-PCS | Mod: ,,, | Performed by: CLINICAL NURSE SPECIALIST

## 2023-03-01 PROCEDURE — 94668 MNPJ CHEST WALL SBSQ: CPT

## 2023-03-01 PROCEDURE — 97535 SELF CARE MNGMENT TRAINING: CPT

## 2023-03-01 PROCEDURE — 80048 BASIC METABOLIC PNL TOTAL CA: CPT | Performed by: INTERNAL MEDICINE

## 2023-03-01 PROCEDURE — 63600175 PHARM REV CODE 636 W HCPCS: Performed by: HOSPITALIST

## 2023-03-01 PROCEDURE — 25000003 PHARM REV CODE 250: Performed by: STUDENT IN AN ORGANIZED HEALTH CARE EDUCATION/TRAINING PROGRAM

## 2023-03-01 PROCEDURE — 99223 1ST HOSP IP/OBS HIGH 75: CPT | Mod: ,,, | Performed by: CLINICAL NURSE SPECIALIST

## 2023-03-01 RX ORDER — SODIUM BICARBONATE 650 MG/1
650 TABLET ORAL 2 TIMES DAILY
Status: DISCONTINUED | OUTPATIENT
Start: 2023-03-02 | End: 2023-03-01

## 2023-03-01 RX ORDER — TACROLIMUS 1 MG/1
2 CAPSULE ORAL 2 TIMES DAILY
Status: DISCONTINUED | OUTPATIENT
Start: 2023-03-01 | End: 2023-03-06 | Stop reason: HOSPADM

## 2023-03-01 RX ORDER — DIVALPROEX SODIUM 250 MG/1
500 TABLET, DELAYED RELEASE ORAL EVERY EVENING
Status: DISCONTINUED | OUTPATIENT
Start: 2023-03-01 | End: 2023-03-02

## 2023-03-01 RX ORDER — DIVALPROEX SODIUM 250 MG/1
250 TABLET, DELAYED RELEASE ORAL 2 TIMES DAILY
Status: DISCONTINUED | OUTPATIENT
Start: 2023-03-01 | End: 2023-03-01

## 2023-03-01 RX ORDER — DIVALPROEX SODIUM 250 MG/1
250 TABLET, DELAYED RELEASE ORAL EVERY MORNING
Status: DISCONTINUED | OUTPATIENT
Start: 2023-03-01 | End: 2023-03-02

## 2023-03-01 RX ADMIN — GABAPENTIN 100 MG: 100 CAPSULE ORAL at 09:03

## 2023-03-01 RX ADMIN — CALCIUM ACETATE 1334 MG: 667 CAPSULE ORAL at 09:03

## 2023-03-01 RX ADMIN — ACETAMINOPHEN 1000 MG: 500 TABLET ORAL at 05:03

## 2023-03-01 RX ADMIN — NIFEDIPINE 60 MG: 30 TABLET, FILM COATED, EXTENDED RELEASE ORAL at 09:03

## 2023-03-01 RX ADMIN — IPRATROPIUM BROMIDE AND ALBUTEROL SULFATE 3 ML: 2.5; .5 SOLUTION RESPIRATORY (INHALATION) at 12:03

## 2023-03-01 RX ADMIN — APIXABAN 5 MG: 5 TABLET, FILM COATED ORAL at 09:03

## 2023-03-01 RX ADMIN — SODIUM BICARBONATE 1300 MG: 650 TABLET ORAL at 06:03

## 2023-03-01 RX ADMIN — TACROLIMUS 2.5 MG: 1 CAPSULE ORAL at 09:03

## 2023-03-01 RX ADMIN — ACETAMINOPHEN 1000 MG: 500 TABLET ORAL at 09:03

## 2023-03-01 RX ADMIN — CALCIUM ACETATE 1334 MG: 667 CAPSULE ORAL at 05:03

## 2023-03-01 RX ADMIN — PARICALCITOL 1 MCG: 1 CAPSULE, LIQUID FILLED ORAL at 09:03

## 2023-03-01 RX ADMIN — HYDRALAZINE HYDROCHLORIDE 50 MG: 25 TABLET, FILM COATED ORAL at 09:03

## 2023-03-01 RX ADMIN — COLCHICINE 0.3 MG: 0.6 TABLET, FILM COATED ORAL at 09:03

## 2023-03-01 RX ADMIN — HYDRALAZINE HYDROCHLORIDE 50 MG: 25 TABLET, FILM COATED ORAL at 05:03

## 2023-03-01 RX ADMIN — IPRATROPIUM BROMIDE AND ALBUTEROL SULFATE 3 ML: 2.5; .5 SOLUTION RESPIRATORY (INHALATION) at 08:03

## 2023-03-01 RX ADMIN — DOXAZOSIN 4 MG: 2 TABLET ORAL at 09:03

## 2023-03-01 RX ADMIN — ATORVASTATIN CALCIUM 40 MG: 20 TABLET, FILM COATED ORAL at 09:03

## 2023-03-01 RX ADMIN — CALCIUM ACETATE 1334 MG: 667 CAPSULE ORAL at 11:03

## 2023-03-01 RX ADMIN — TACROLIMUS 2 MG: 1 CAPSULE ORAL at 05:03

## 2023-03-01 RX ADMIN — FUROSEMIDE 15 MG/HR: 10 INJECTION, SOLUTION INTRAMUSCULAR; INTRAVENOUS at 01:03

## 2023-03-01 RX ADMIN — CLONAZEPAM 0.5 MG: 0.5 TABLET ORAL at 09:03

## 2023-03-01 RX ADMIN — METOPROLOL SUCCINATE 25 MG: 25 TABLET, EXTENDED RELEASE ORAL at 09:03

## 2023-03-01 RX ADMIN — FLUTICASONE FUROATE AND VILANTEROL TRIFENATATE 1 PUFF: 100; 25 POWDER RESPIRATORY (INHALATION) at 08:03

## 2023-03-01 RX ADMIN — DIVALPROEX SODIUM 500 MG: 250 TABLET, DELAYED RELEASE ORAL at 05:03

## 2023-03-01 RX ADMIN — IPRATROPIUM BROMIDE AND ALBUTEROL SULFATE 3 ML: 2.5; .5 SOLUTION RESPIRATORY (INHALATION) at 03:03

## 2023-03-01 RX ADMIN — PREDNISONE 5 MG: 5 TABLET ORAL at 09:03

## 2023-03-01 RX ADMIN — DIVALPROEX SODIUM 250 MG: 250 TABLET, DELAYED RELEASE ORAL at 09:03

## 2023-03-01 RX ADMIN — ASPIRIN 81 MG: 81 TABLET, COATED ORAL at 09:03

## 2023-03-01 RX ADMIN — Medication 6 MG: at 09:03

## 2023-03-01 RX ADMIN — FAMOTIDINE 20 MG: 20 TABLET ORAL at 09:03

## 2023-03-01 NOTE — ASSESSMENT & PLAN NOTE
- required transfusion on 2/22 and 2/27  - CT A/P negative for intra-abdominal bleeding  - holding home Eliquis for now. Resumed AC on 2/28 since no evidence of bleeding  - followup stool occult  - CBC daily

## 2023-03-01 NOTE — ASSESSMENT & PLAN NOTE
Impression: Pt is a 68 y/o male with renal transplant who was admitted for acute on chronic kidney injury. Pt had kidney transplant times 2- first one in 1992 and then again in 2005. Pt is alert, oriented to person, place, and situation for most part. Pt has sitter cam at bedside. Pt is a full code.     Reason for consult: GOC/ACP. Pt has KATINA on CKD. Pt does not want dialysis if needed in future.     Goals of care/ACP:   Met with pt and pt's wife. Pt and wife aware pt having acute kidney injury on CKD. Per pt, he would never want to go back on dialysis if needed in future. Pt's wife is aware. Pt concerned his wishes may not be honored.  Explained to pt and wife, that is pt's goal is not to have dialysis, his wishes would be honored. Discussed with pt and wife, in future if pt needs dialysis and pt does not want it, focus would change to comfort/QOL. Hospice care discussed.     At this time, plan is to continue medical management. Pal care will follow along   MPOA: Pt's wife is NOK.     Symptom management:     Debility:   PT/Ot seeing pt.   SNF recommended.     Delirium  Pt is alert and oriented to person, place, and situation for most part.   - started depakote. Weaned down to depakote 250 mg nightly   - delirium precautions    Pt denies pain, anxiety.     Plan:  Pal care will continue to follow for ACP.

## 2023-03-01 NOTE — ASSESSMENT & PLAN NOTE
Baseline seems to be a creatinine in the 2s   Cr still elevated unchanged at 3.8 despite IVF. Scr increased to 4.0 --> 3.8  US no overly revealing; Transplant working up rejection  - nephrology consulted. apprec recs  -- followup DSA  -- off IVF. Ordered IV lasix 40 mg BID x 2 doses on 2/23. Increased to lasix gtt on 2/27  -- BMP daily   - palliative care consulted. followup recs  - patient expressed desire to not proceed with dialysis if it is indicated in the future. Will need GOC discussion and advance care planning in the event that aggressive diuresis does not improve his renal function

## 2023-03-01 NOTE — PROGRESS NOTES
[facility] - Int[facility] ([address] Medicine  Progress Note    Patient Name: Ibrahima Phelps  MRN: [id number]  Patient Class: IP- Inpatient   Admission Date: 2/20/2023  Length of Stay: 8 days  Attending Physician: Morris Payan MD  Primary Care Provider: Connie Magdaleno MD        Subjective:     Principal Problem:Acute kidney injury superimposed on CKD        HPI:  67-year-old black male transplant kidney patient being admitted for KATINA.  Patient was just discharged from a hospitalization for COVID pneumonia on 02/10/2023.  He also paid in ED visit a few days ago for what could have been a mild cellulitis/folliculitis on his foot for which he is almost done with his Keflex regimen.    Patient was instructed to come to the ED this time by the COVID surveillance nurses due to desaturation down to 88%.  When I talked with the patient and his wife in detail, they affirmed that he was not having any new or worsening shortness of breath since his discharge.  His wife did report that once the patient woke up/sat up his desaturation spontaneously resolved.  Patient denies any steven current shortness of breath or chest pain.  No nausea vomiting or flu fever like feeling.  In my discussion with the ED provider who was noted that the patient was complaining of shortness of breath but there was a concern that this likely was anxiety triggered.  Patient seems to be saturating well on room air here in the ED and chest x-ray which I personally reviewed is clear bilaterally.  Wife reports patient has been compliant with his medications.    The blood work did demonstrate an acutely elevated creatinine of 3.8 whereas his baseline is in the 2s.      Overview/Hospital Course:  67-year-old renal transplant black male was admitted for acute on chronic kidney injury.  Patient was just discharged from hospitalization for COVID pneumonia on 02/10/2023.  Presented to the ED per recommendations of the COVID surveillance team.   Did not have any definitive acute respiratory findings to indicate acute ongoing infectious COVID, chest x-ray was clear.  However creatinine was significantly elevated up to 3.8 whereas baseline was in the 2s.  Was started on IV fluids with trivial improvement.      Interval History: Patient lying in bed, no acute distress. Wife at bedside. Overnight, patient had mechanical fall by tripping over the tele wires and didn't hit his head. CTH negative for acute changes. Diuresing well on IV lasix gtt. Per nephrology, increased lasix gtt to 15 mg/hr and added IV diuril 500 mg BID. SOB improving and weaned from 2.5 to 2 L NC. CT A/P negative for bleed so resumed home eliquis. Hb improved from 6.3 to 7.3 today after blood transfusion on 2/27. Scr improved from 3.8 to 3.7. Nephrology discussed POC with patient and spouse including potential need for dialysis if renal function does not improve with aggressive diuresis. At this time, patient prefers not to pursue dialysis if it is recommended in the future, so palliative care consulted to assist with GOC discussion and advance care planning.     Intake/Output Summary (Last 24 hours) at 2/28/2023 1903  Last data filed at 2/28/2023 1644  Gross per 24 hour   Intake 660 ml   Output 2900 ml   Net -2240 ml           Review of Systems   Constitutional:  Positive for activity change and fatigue. Negative for chills and fever.   HENT:  Positive for voice change. Negative for congestion and trouble swallowing.    Eyes:  Negative for visual disturbance.   Respiratory:  Positive for cough and shortness of breath.    Cardiovascular:  Negative for chest pain and leg swelling.   Gastrointestinal:  Negative for abdominal distention, abdominal pain, nausea and vomiting.   Endocrine: Negative for cold intolerance, heat intolerance, polydipsia and polyuria.   Genitourinary:  Negative for difficulty urinating and dysuria.        Urinary incontinence   Musculoskeletal:  Positive for arthralgias  (right ankle). Negative for back pain and myalgias.   Skin:  Negative for rash and wound.   Neurological:  Positive for weakness. Negative for dizziness and light-headedness.   Hematological:  Negative for adenopathy. Does not bruise/bleed easily.   Psychiatric/Behavioral:  Negative for confusion and sleep disturbance.      Objective:     Vital Signs (Most Recent):  Temp: 98.5 °F (36.9 °C) (02/28/23 1707)  Pulse: 77 (02/28/23 1707)  Resp: 20 (02/28/23 1707)  BP: (!) 160/72 (02/28/23 1707)  SpO2: 98 % (02/28/23 1707)   Vital Signs (24h Range):  Temp:  [97.5 °F (36.4 °C)-98.6 °F (37 °C)] 98.5 °F (36.9 °C)  Pulse:  [66-86] 77  Resp:  [18-20] 20  SpO2:  [92 %-99 %] 98 %  BP: (127-160)/(58-74) 160/72     Weight: 108.4 kg (238 lb 15.7 oz)  Body mass index is 31.53 kg/m².    Intake/Output Summary (Last 24 hours) at 2/28/2023 1903  Last data filed at 2/28/2023 1644  Gross per 24 hour   Intake 660 ml   Output 2900 ml   Net -2240 ml        Physical Exam  Constitutional:       Appearance: He is well-developed.   HENT:      Head: Normocephalic and atraumatic.      Mouth/Throat:      Mouth: Mucous membranes are moist.      Comments: hoarseness  Eyes:      General: No scleral icterus.     Extraocular Movements: Extraocular movements intact.      Pupils: Pupils are equal, round, and reactive to light.   Neck:      Vascular: No JVD.   Cardiovascular:      Rate and Rhythm: Normal rate and regular rhythm.      Heart sounds: No murmur heard.    No friction rub. No gallop.   Pulmonary:      Effort: Pulmonary effort is normal. No respiratory distress.      Breath sounds: Examination of the right-lower field reveals decreased breath sounds. Examination of the left-lower field reveals decreased breath sounds. Decreased breath sounds, wheezing and rhonchi present. No rales.   Abdominal:      General: Bowel sounds are normal. There is no distension.      Palpations: Abdomen is soft.      Tenderness: There is no abdominal tenderness.    Musculoskeletal:         General: Tenderness (right ankle) present. No deformity. Normal range of motion.      Cervical back: Neck supple.   Lymphadenopathy:      Cervical: No cervical adenopathy.   Skin:     General: Skin is warm and dry.      Capillary Refill: Capillary refill takes less than 2 seconds.      Findings: No erythema or rash.   Neurological:      General: No focal deficit present.      Mental Status: He is alert and oriented to person, place, and time.      Cranial Nerves: No cranial nerve deficit.      Sensory: No sensory deficit.      Motor: Weakness present.   Psychiatric:         Mood and Affect: Mood normal.       Significant Labs: A1C:   Recent Labs   Lab 09/08/22  0909 01/04/23  0819   HGBA1C 5.4 5.5       CBC:   Recent Labs   Lab 02/27/23  0051 02/27/23  0246 02/27/23  0549 02/27/23  0623 02/28/23  0440   WBC 10.80 10.35  --   --  8.76   HGB 6.8* 6.6*  --   --  7.3*   HCT 23.3* 23.1* 20* 22* 24.1*    230  --   --  231       CMP:   Recent Labs   Lab 02/27/23  0051 02/27/23  0246 02/28/23  0440    138 140   K 4.0 4.7 4.2    104 104   CO2 22* 22* 22*   * 89 84   BUN 50* 46* 48*   CREATININE 3.9* 3.8* 3.7*   CALCIUM 7.6* 7.7* 7.8*   PROT 7.3  --   --    ALBUMIN 2.2*  --   --    BILITOT 0.4  --   --    ALKPHOS 78  --   --    AST 25  --   --    ALT 20  --   --    ANIONGAP 12 12 14       Coagulation: No results for input(s): PT, INR, APTT in the last 48 hours.  Lactic Acid:   No results for input(s): LACTATE in the last 48 hours.        Significant Imaging: I have reviewed all pertinent imaging results/findings within the past 24 hours.      Assessment/Plan:      * Acute kidney injury superimposed on CKD  Baseline seems to be a creatinine in the 2s   Cr still elevated unchanged at 3.8 despite IVF. Scr increased to 4.0 --> 3.8  US no overly revealing; Transplant working up rejection  - nephrology consulted. apprec recs  -- followup DSA  -- off IVF. Ordered IV lasix 40 mg  "BID x 2 doses on 2/23. Increased to lasix gtt on 2/27  -- BMP daily   - palliative care consulted. followup recs  - patient expressed desire to not proceed with dialysis if it is indicated in the future. Will need GOC discussion and advance care planning in the event that aggressive diuresis does not improve his renal function          Delirium  - started depakote. Weaned down to depakote 250 mg nightly   - delirium precautions      Acute hypoxemic respiratory failure  Patient with Hypoxic Respiratory failure which is Acute.  he is not on home oxygen. Supplemental oxygen was provided and noted-  .   Signs/symptoms of respiratory failure include- tachypnea, increased work of breathing, respiratory distress, wheezing and lethargy. Contributing diagnoses includes - CHF, Obesity Hypoventilation and Pleural effusion Labs and images were reviewed. Patient Has recent ABG, which has been reviewed. Will treat underlying causes and adjust management of respiratory failure.    PLAN:  - h/o COVID 2/7/2023  - continue scheduled and prn duonebs  - continue supportive care: chest PT, acapella, IS, OOB  - continue CHF and KATINA management   - continue IV diuresis   - see "chronic dyspnea"  - IMPROVING    Right ankle pain  - suspect may be 2/2 gout flare  - h/o gout   - followup Xray right ankle negative   - uric acid elevated at 14.5  - colchicine 0.3 mg daily due to KATINA  - continue to monitor   - IMPROVING      Hypoxia  - continue IV diuresis   - weaning supplemental O2 as tolerated  - see "acute hypoxic respiratory failure"      Anemia of renal disease  - see anemia of chronic kidney disease      Wheezing  - albuterol inhaler prn  - see "acute hypoxic respiratory failure"      Chronic dyspnea  Acute hypoxic respiratory failure  -Unchanged prior to admission; Don't suspect acute covid  -CT chest w/o contrast showing BL pleural effusions; unable to perform thoracentesis due to insufficient fluid for safe procedure   - required BiPAP " "on 2/27 for pO2 of 44. Increased to lasix gtt  -tx underlying anemia - see below  - continue supportive care: albuterol prn, incentive spirometry, anti-tussive meds, chest PT, acapella, OOB to chair  -covid + on 2/7/23  - IMPROVING      Cellulitis of foot  - completed 7 days of Keflex (end date: 2/27)    Hypocalcemia  - replete prn      Debility  - PT/OT recommend SNF      Anemia due to stage 4 chronic kidney disease  - required transfusion on 2/22 and 2/27  - CT A/P negative for intra-abdominal bleeding  - holding home Eliquis for now. Resumed AC on 2/28 since no evidence of bleeding  - followup stool occult  - CBC daily       Acute on chronic diastolic heart failure  - Initially held home dosing of p.r.n. Lasix given KATINA and did trial of IVF but no improvement in Scr  - nephrology following   - started trial of IV lasix on 2/23 with improvement in Scr. Started on lasix gtt on 2/27. Added IV diuril BID on 2/28 per nephro recs  - continue home metoprolol  - followup BMP    Paroxysmal atrial fibrillation  Continue home Eliquis, metoprolol  - EP cardiology consulted, apprec recs  -- holding home  Flecainide due to KATINA        H/O kidney transplant  K transplant service following  Routine prograf levels  Continue home Prograf 2 mg b.i.d.  Continue home prednisone  - can restart on  BID at the time of discharge      CAD (coronary artery disease)  History of stenting years ago   Continue home aspirin and statin, Toprol      Hypertension  Continue home metoprolol, nifedipine, hydralazine      Long-term use of immunosuppressant medication  - see "kidney transplant"        VTE Risk Mitigation (From admission, onward)         Ordered     apixaban tablet 5 mg  2 times daily         02/28/23 1904     Place sequential compression device  Until discontinued         02/27/23 1212                Discharge Planning   RAMU: 3/3/2023     Code Status: Full Code   Is the patient medically ready for discharge?: No    Reason for " patient still in hospital (select all that apply): Patient unstable, Patient trending condition, Laboratory test, Treatment, Consult recommendations, PT / OT recommendations and Pending disposition  Discharge Plan A: Home with family            Time spent in care of patient: > 35 minutes         Morris Payan MD  Department of Hospital Medicine   [facility] - Intensive Care ([place]-[facility])

## 2023-03-01 NOTE — PT/OT/SLP PROGRESS
"Occupational Therapy   Treatment    Name: Ibrahima Phelps  MRN: [id number]  Admitting Diagnosis:  Acute kidney injury superimposed on CKD       Recommendations:     Discharge Recommendations: nursing facility, skilled  Discharge Equipment Recommendations:  bedside commode, walker, rolling, shower chair  Barriers to discharge:  None    Assessment:     Ibrahima Phelps is a 67 y.o. male with a medical diagnosis of Acute kidney injury superimposed on CKD.  He presents with the following performance deficits affecting function:  weakness, impaired endurance, impaired self care skills, impaired functional mobility, gait instability, impaired balance, decreased upper extremity function, decreased lower extremity function, pain, decreased safety awareness, impaired cardiopulmonary response to activity.     Pt agreeable to therapy and tolerated the session well. Pt ambulated into the bathroom where he attempted to have a BM. Pt was unsuccessful and was c/o dizziness. Pt ambulated back to the bed where BP was taken at 158/72 and symptoms resolved after laying supine. Overall, Pt has made progress towards his functional goals. Pt would benefit from continued skilled acute OT services in order to maximize independence and safety with ADLs and functional mobility to ensure safe return to PLOF in the least restrictive environment. OT recommending SNF once pt is medically appropriate for d/c.     Rehab Prognosis:  Good; patient would benefit from acute skilled OT services to address these deficits and reach maximum level of function.       Plan:     Patient to be seen 3 x/week to address the above listed problems via self-care/home management, therapeutic activities, therapeutic exercises, neuromuscular re-education  Plan of Care Expires: 03/26/23  Plan of Care Reviewed with: patient, spouse    Subjective     "I need to use the bathroom"     Pain/Comfort:  Pain Rating 1: other (see comments) (not rated)  Location - Orientation 1: " generalized  Location 1: penis (from pearson)  Pain Addressed 1: Reposition, Distraction, Nurse notified  Pain Rating Post-Intervention 1: other (see comments) (not rated)    Objective:     Communicated with: RN prior to session.  Patient found HOB elevated with pulse ox (continuous), telemetry, peripheral IV, pearson catheter upon OT entry to room.    General Precautions: Standard, fall    Orthopedic Precautions:N/A  Braces: N/A  Respiratory Status: Room air     Occupational Performance:     Bed Mobility:    Patient completed Rolling/Turning to Right with stand by assistance  Patient completed Scooting/Bridging with stand by assistance  Patient completed Supine to Sit with stand by assistance  Patient completed Sit to Supine with stand by assistance     Functional Mobility/Transfers:  Patient completed Sit <> Stand Transfer with moderate assistance  with  rolling walker   Patient completed Toilet Transfer Step Transfer technique with moderate assistance with  rolling walker  Functional Mobility: Pt engaging in functional mobility to simulate household/community distances approx 10+10 ft with Min A and utilizing IV pole to bathroom and RW on the way back in order to maximize functional activity tolerance and standing balance required for engagement in occupations of choice.  Cues for safety awareness     Activities of Daily Living:  Toileting: maximal assistance : For an unsuccessful BM on the toilet. Therapist provided assistance for posterior hygiene while Pt participated in the stand.     [facility] 6 Click ADL: 17    Treatment & Education:  Therapist provided facilitation and instruction of proper body mechanics and fall prevention strategies during tasks listed above.  Instructed patient to sit in bedside chair daily to increase OOB/activity tolerance.  Instructed patient to use call light to have nursing staff assist with needs/transfers.  Discussed OT POC and answered all questions within OT scope of  practice.  Whiteboard updated       Patient left HOB elevated with all lines intact, call button in reach, and wife present    GOALS:   Multidisciplinary Problems       Occupational Therapy Goals          Problem: Occupational Therapy    Goal Priority Disciplines Outcome Interventions   Occupational Therapy Goal     OT, PT/OT Ongoing, Progressing    Description: Goals to be met by: 03/13     Patient will increase functional independence with ADLs by performing:    UE Dressing with Stand-by Assistance.  LE Dressing with Minimal Assistance.  Grooming while standing at sink with Contact Guard Assistance.  Toilet transfer to toilet with Contact Guard Assistance.                         Time Tracking:     OT Date of Treatment: 03/01/23  OT Start Time: 0930  OT Stop Time: 0955  OT Total Time (min): 25 min    Billable Minutes:Self Care/Home Management 15  Therapeutic Activity 10    OT/CONRAD: OT     CONRAD Visit Number: 0    3/1/2023

## 2023-03-01 NOTE — MEDICAL/APP STUDENT
Progress Note  Hospital Medicine    Patient Name: Ibraihma Phelps  YOB: 1955    Admit Date: 2/20/2023                     LOS: 9    SUBJECTIVE:     Reason for Admission:  Acute kidney injury superimposed on CKD    HPI:  67-year-old black male transplant kidney patient being admitted for KATINA.  Patient was just discharged from a hospitalization for COVID pneumonia on 02/10/2023.  He also paid in ED visit a few days ago for what could have been a mild cellulitis/folliculitis on his foot for which he is almost done with his Keflex regimen.    Patient was instructed to come to the ED this time by the COVID surveillance nurses due to desaturation down to 88%.  When I talked with the patient and his wife in detail, they affirmed that he was not having any new or worsening shortness of breath since his discharge.  His wife did report that once the patient woke up/sat up his desaturation spontaneously resolved.  Patient denies any steven current shortness of breath or chest pain.  No nausea vomiting or flu fever like feeling.  In my discussion with the ED provider who was noted that the patient was complaining of shortness of breath but there was a concern that this likely was anxiety triggered.  Patient seems to be saturating well on room air here in the ED and chest x-ray which I personally reviewed is clear bilaterally.  Wife reports patient has been compliant with his medications.    The blood work did demonstrate an acutely elevated creatinine of 3.8 whereas his baseline is in the 2s.    Hospital Course:  67-year-old renal transplant black male was admitted for acute on chronic kidney injury.  Patient was just discharged from hospitalization for COVID pneumonia on 02/10/2023.  Presented to the ED per recommendations of the COVID surveillance team.  Did not have any definitive acute respiratory findings to indicate acute ongoing infectious COVID, chest x-ray was clear.  However creatinine was significantly  elevated up to 3.8 whereas baseline was in the 2s.  Was started on IV fluids with trivial improvement.    Interval history: Patient was seen by PT/OT, PMR, and Kidney transplant. PT/OT will be working with the patient 3x a week. PMR is recommending SNF for dispo but HH is possible. Kidney transplant saw the patient and said the patient may need dialysis in the future (within 6 months), patient stated he did not want dialysis, recs on consulting palliative care.    Overnight the patient experienced delirium and halluncinations, per wife. We had decreased the dose of the divalproex EC to nightly 250 mg. Otherwise, the patient and wife mentioned no concerns. Meeting the patient this morning, he was off oxygen, but in somewhat uncomfortable position in bed.    Review of Systems:  Review of Systems   Constitutional:  Positive for activity change (difficulty ambulating) and fatigue (staying in bed mostly). Negative for appetite change and fever.   Respiratory:  Negative for apnea, cough, chest tightness, shortness of breath and wheezing.    Cardiovascular:  Negative for chest pain, palpitations, leg swelling and claudication.   Gastrointestinal:  Negative for abdominal distention, abdominal pain, constipation, diarrhea, nausea and vomiting.   Genitourinary:  Negative for decreased urine volume, difficulty urinating and dysuria.   Musculoskeletal:  Negative for joint swelling (did not mention anything about this during the disucssion).   Neurological:  Negative for dizziness, light-headedness and headaches.   Psychiatric/Behavioral:  Positive for confusion (overnight, although seemed off this morning), decreased concentration and hallucinations (per wife). Negative for agitation and behavioral problems. The patient is not nervous/anxious.      OBJECTIVE:     Vitals:    02/28/23 2344 03/01/23 0318 03/01/23 0442 03/01/23 0710   BP: (!) 147/67  (!) 152/74    Patient Position: Lying  Lying    Pulse: 70 75 76 72   Resp: 20  18     Temp: 97.7 °F (36.5 °C)  97.4 °F (36.3 °C)    TempSrc: Oral  Oral    SpO2: (!) 93% 97% (!) 93%    Weight:       Height:         Vital Signs Range (Last 24H):  Temp:  [97.4 °F (36.3 °C)-98.6 °F (37 °C)]   Pulse:  [69-84]   Resp:  [18-20]   BP: (142-160)/(66-74)   SpO2:  [92 %-100 %]     Body mass index is 31.53 kg/m².    Wt Readings from Last 3 Encounters:   02/27/23 0648 108.4 kg (238 lb 15.7 oz)   02/20/23 1158 101.6 kg (224 lb)   02/10/23 0513 101.6 kg (224 lb)   02/07/23 2305 101.9 kg (224 lb 10.4 oz)   02/07/23 1102 107.5 kg (237 lb)   01/12/23 1048 103.6 kg (228 lb 6.3 oz)       Physical Exam:  Physical Exam  Vitals and nursing note reviewed. Exam conducted with a chaperone present.   Constitutional:       General: He is awake.      Appearance: He is obese. He is ill-appearing.      Comments: Room air   HENT:      Head: Normocephalic.   Eyes:      Extraocular Movements: Extraocular movements intact.      Pupils: Pupils are equal, round, and reactive to light.   Neck:      Vascular: No JVD.   Cardiovascular:      Rate and Rhythm: Normal rate. Rhythm irregularly irregular.      Pulses: Normal pulses.      Heart sounds: Normal heart sounds.   Pulmonary:      Effort: Pulmonary effort is normal.      Breath sounds: Examination of the right-lower field reveals decreased breath sounds and rales. Decreased breath sounds and rales present.      Comments: Seems to be improved compared to yesterday  Abdominal:      General: Bowel sounds are normal.      Palpations: Abdomen is soft.      Tenderness: There is no abdominal tenderness.   Musculoskeletal:      Right lower leg: No edema.      Left lower leg: No edema.      Right ankle: No tenderness.      Comments: Slight flapping tremors both hands   Neurological:      Mental Status: He is disoriented and confused.   Psychiatric:         Attention and Perception: He is inattentive.         Mood and Affect: Mood normal. Affect is flat.         Behavior: Behavior is  uncooperative and withdrawn.         Judgment: Judgment normal.      Comments: Patient seemed delirious during the conversation, worse than yesterday.       I & O (Last 24H):  Intake/Output Summary (Last 24 hours) at 3/1/2023 0802  Last data filed at 3/1/2023 0604  Gross per 24 hour   Intake 780 ml   Output 5300 ml   Net -4520 ml     Intake/Output - Last 3 Shifts         02/27 0700 02/28 0659 02/28 0700 03/01 0659 03/01 0700 03/02 0659    P.O. 560 780     Total Intake(mL/kg) 560 (5.2) 780 (7.2)     Urine (mL/kg/hr) 1600 (0.6) 5900 (2.3)     Stool 0 0     Total Output 1600 5900     Net -1040 -5120            Stool Occurrence 0 x 0 x            Nearly 6L off    Significant Labs:    CBC  Recent Labs   Lab 02/27/23  0246 02/27/23  0549 02/27/23  0623 02/28/23  0440 03/01/23  0224   WBC 10.35  --   --  8.76 7.38   HGB 6.6*  --   --  7.3* 7.9*   HCT 23.1*   < > 22* 24.1* 25.7*     --   --  231 259    < > = values in this interval not displayed.       CMP  Recent Labs   Lab 02/23/23  0808 02/24/23  0819 02/25/23  1402 02/26/23  0532 02/27/23  0051 02/27/23  0246 02/28/23  0440 03/01/23  0224   * 137   < > 135* 136 138 140 142   K 4.3 4.1   < > 4.1 4.0 4.7 4.2 3.7    106   < > 104 102 104 104 102   CO2 18* 19*   < > 18* 22* 22* 22* 28   ANIONGAP 11 12   < > 13 12 12 14 12   BUN 41* 41*   < > 48* 50* 46* 48* 51*   CREATININE 4.0* 3.8*   < > 3.7* 3.9* 3.8* 3.7* 3.9*    95   < > 80 115* 89 84 83   CALCIUM 6.7* 7.2*   < > 7.3* 7.6* 7.7* 7.8* 8.1*   MG  --   --    < > 2.3 2.3 2.5  --   --    PHOS 6.1* 6.4*   < > 6.2* 5.9* 6.2*  --   --    ALKPHOS  --   --   --   --  78  --   --   --    ALT  --   --   --   --  20  --   --   --    AST  --   --   --   --  25  --   --   --    ALBUMIN 2.1* 2.2*  --   --  2.2*  --   --   --    PROT  --   --   --   --  7.3  --   --   --    BILITOT  --   --   --   --  0.4  --   --   --     < > = values in this interval not displayed.      Lab Results   Component Value Date     INR 1.2 02/07/2023    INR 1.1 03/21/2022    INR 1.2 06/17/2020    INR 1.2 06/17/2020     Lab Results   Component Value Date    HGBA1C 5.5 01/04/2023     No results for input(s): POCTGLUCOSE in the last 72 hours.    Urinalysis  No results for input(s): COLORU, CLARITYU, SPECGRAV, PHUR, PROTEINUA, GLUCOSEU, BILIRUBINCON, BLOODU, WBCU, RBCU, BACTERIA, MUCUS, NITRITE, LEUKOCYTESUR, UROBILINOGEN, HYALINECASTS in the last 24 hours.     Microbiology  Microbiology Results (last 7 days)       Procedure Component Value Units Date/Time    Respiratory Infection Panel (PCR), Nasopharyngeal [[id number]]     Order Status: No result Specimen: Nasopharyngeal Swab     Aerobic culture [[id number]]     Order Status: No result Specimen: Pleural Fluid     Culture, Anaerobic [[id number]]     Order Status: No result Specimen: Pleural Fluid     Gram stain [[id number]]     Order Status: No result Specimen: Pleural Fluid     AFB Culture & Smear [[id number]]     Order Status: No result Specimen: Pleural Fluid              Imaging  Imaging Results              X-Ray Chest AP Portable (Final result)  Result time 02/20/23 12:35:47      Final result by Bryon Nguyen MD (02/20/23 12:35:47)                   Impression:      See above      Electronically signed by: Bryon Nguyen MD  Date:    02/20/2023  Time:    12:35               Narrative:    EXAMINATION:  XR CHEST AP PORTABLE    CLINICAL HISTORY:  COVID-19;    TECHNIQUE:  Single frontal view of the chest was performed.    COMPARISON:  No 02/07/2023 ne    FINDINGS:  Heart size normal.  No significant airspace consolidation or pleural effusion identified                                     CT Head Without Contrast    Result Date: 2/28/2023  Allowing for artifacts from motion no definite acute intracranial findings specifically without evidence for acute intracranial hemorrhage or sulcal effacement to suggest large territory recent infarction.  Clinical correlation and further evaluation with repeat  attempts for imaging as warranted when patient better able to tolerate scanning advised. Electronically signed by: Reji Rae DO Date:    02/28/2023 Time:    12:59    CT Abdomen Pelvis  Without Contrast    Result Date: 2/28/2023  No convincing retroperitoneal nor abdominal wall hematoma.. Bilateral pleural effusion with compression atelectasis. End-stage renal disease with renal transplant. Cardiomegaly. Hepatomegaly with mildly nodular contour, which is often seen in cirrhosis. Additional findings as above. Electronically signed by resident: Laura Smith Date:    02/28/2023 Time:    10:10 Electronically signed by: Mart Chambers MD Date:    02/28/2023 Time:    12:47      ASSESSMENT/PLAN:     Mr. Ibrahima Phelps is a 67 y.o. male with a kidney transplant currently on immunosuppressants and CKD4, HTN, CAD, pAF, HFpEF, anemia of renal disease, recently discharge 2/2 COVID pneumonia 02/10. Presented with oxygen desaturation to 88%, but was not having worsened dyspnea or SOB.    1. Long-term use of immunosuppressant medication    2. SOB (shortness of breath)    3. COVID-19    4. Atrial fibrillation    5. H/O kidney transplant    6. Hypoxia    7. Acute kidney injury superimposed on CKD    8. Shortness of breath        # Acute kidney injury superimposed on CKD  Mr. Phelps is a 66 yo man with a kidney transplant currently on immunosuppressants and CKD4, HTN, CAD, pAF, HFpEF, anemia of renal disease, recently discharge 2/2 COVID pneumonia 02/10. Presented with oxygen desaturation to 88%, but was not having worsened dyspnea or SOB.  - recent labs show elevated BUN and Cr (48/3.7 both consistent with previous CMP), average 2.8  - anemia of renal disease evident H/H 7.3/24.1  - After discussion with kidney transplant, aim to hav 3-4 L UOP within 24 hours, increase Lasix drip to 15 mg/h and add 500 mg BID chlorothiazide. Potentially push the kidney if this is due to fluid overload; otherwise, patient might be looking at dialysis  -  UOP 01-Mar 5.9 L on 15 mg/h Lasix and 500 mg BID Diuril    PLAN:  [  ] continue IVF  [  ] daily BMP  [  ] continue home immunosuppressant meds  [  ] Epoetin samara injection 20,000 U Q7d  [  ] cont Lasix drip 15 mg/h  [  ] cont chlorothiazide 500 mg BID  [  ] further discuss the need for dialysis?    # Delirium  - overnight patient was delirious per wife  - had reduced the dose of divalproex EC    PLAN:  [  ] delirium protocol for the patient  [  ] return the dose of divalproex 250 mg BID (AM and PM)    # Fall overnight  - notes indicate the patient had an unwitnessed fall. Both the patient and his wife say it was not a fall, he got tangled in the wires when trying to get out of bed.  - CT Head non con ordered - NORMAL    # hypoxic event overnight  - patient oxygen sat dropped into the 60s, placed on breathing treatment per RR, Sat O2 80s (26-27 Feb)  - suspected fluid overload to be the cause of event, CXR showing decreased R hemidiaphragm angle, possible pleural effusion  - 27 Feb ABG AM pH 7.472, pCO2 35.9, PO2 118, pHCo3 26.2 (improved compared to yesterday 7.483/32.5/47/24.4)  - CT Abdomen/Pelvis shows bilateral pleural effusion    PLAN:  [  ] Lasix drip 15 mg/h (500 mg/24h)    # H/o Kidney transplant  - see KATINA on CKD  - possibility of the HD in the near future, consulted palliative care    # Wheezing  - albuterol PRN    # anemia of renal disease  - see KATINA on CKD  - assess for potential underlying bleed, due to low H/H, CT Abdomen and pelvis ordered    # HFpEF  - daily weight, I/Os, continue home metoprolol, daily BNP  - Drip 15 mg/h (500 mg/24h)  - CT Abdomen/Pelvis shows bilateral pleural effusion    # pAF  - home meds eliquis, metoprolol    # CAD  - home statin and aspirin, metoprolol    # HTN  - continue metoprolol, nifedipine, and hydralazine    # DVT Ppx  - Eliquis 5 mg restarted following normal (no bleeds) CT Abdomen and pelvis      VTE Risk Mitigation (From admission, onward)           Ordered      apixaban tablet 5 mg  2 times daily         02/28/23 1904     Place sequential compression device  Until discontinued         02/27/23 1212                    DISCHARGE PLANNING:  Discharge Planning   RAMU: 3/6/2023     Code Status: Full Code   Is the patient medically ready for discharge?: No    Reason for patient still in hospital (select all that apply): Patient trending condition, Laboratory test, Treatment, Consult recommendations, and PT / OT recommendations  Discharge Plan A: Home with family          Signing Physician:  SATYA Breen  [facility] MED R

## 2023-03-01 NOTE — ASSESSMENT & PLAN NOTE
"Patient with Hypoxic Respiratory failure which is Acute.  he is not on home oxygen. Supplemental oxygen was provided and noted-  .   Signs/symptoms of respiratory failure include- tachypnea, increased work of breathing, respiratory distress, wheezing and lethargy. Contributing diagnoses includes - CHF, Obesity Hypoventilation and Pleural effusion Labs and images were reviewed. Patient Has recent ABG, which has been reviewed. Will treat underlying causes and adjust management of respiratory failure.    PLAN:  - h/o COVID 2/7/2023  - continue scheduled and prn duonebs  - continue supportive care: chest PT, acapella, IS, OOB  - continue CHF and KATINA management   - continue IV diuresis   - see "chronic dyspnea"  - IMPROVING  "

## 2023-03-01 NOTE — ASSESSMENT & PLAN NOTE
Continue home Eliquis, metoprolol  - EP cardiology consulted, apprec recs  -- holding home  Flecainide due to KATINA

## 2023-03-01 NOTE — NURSING
Pt was c/o some burning from pearson catheter. Stabilization device had come off and I placed a new one but there is some swelling around the head of the penis. MD notified

## 2023-03-01 NOTE — HPI
Pt is a 67-year-old black male transplant kidney patient being admitted for KATINA.  Patient was just discharged from a hospitalization for COVID pneumonia on 02/10/2023.  He also paid in ED visit a few days ago for what could have been a mild cellulitis/folliculitis on his foot for which he is almost done with his Keflex regimen.      Per chart review, patient was instructed to come to the ED this time by the COVID surveillance nurses due to desaturation down to 88%.  When I talked with the patient and his wife in detail, they affirmed that he was not having any new or worsening shortness of breath since his discharge.  His wife did report that once the patient woke up/sat up his desaturation spontaneously resolved.  Patient denies any steven current shortness of breath or chest pain.  No nausea vomiting or flu fever like feeling.  In my discussion with the ED provider who was noted that the patient was complaining of shortness of breath but there was a concern that this likely was anxiety triggered.  Patient seems to be saturating well on room air here in the ED and chest x-ray which I personally reviewed is clear bilaterally.  Wife reports patient has been compliant with his medications.    The blood work did demonstrate an acutely elevated creatinine of 3.8 whereas his baseline

## 2023-03-01 NOTE — PLAN OF CARE
KTM Chart Review    Intake/Output Summary (Last 24 hours) at 3/1/2023 0955  Last data filed at 3/1/2023 0604  Gross per 24 hour   Intake 780 ml   Output 5300 ml   Net -4520 ml     Vitals:    03/01/23 0442 03/01/23 0710 03/01/23 0812 03/01/23 0828   BP: (!) 152/74   (!) 140/63   BP Location:    Left arm   Patient Position: Lying   Lying   Pulse: 76 72 76 78   Resp: 18 18 18   Temp: 97.4 °F (36.3 °C)   98.1 °F (36.7 °C)   TempSrc: Oral   Oral   SpO2: (!) 93%  99% 100%   Weight:       Height:         Recent Labs   Lab 02/26/23  0532 02/27/23  0051 02/27/23  0246 02/28/23  0440 03/01/23  0224   * 136 138 140 142   K 4.1 4.0 4.7 4.2 3.7    102 104 104 102   CO2 18* 22* 22* 22* 28   BUN 48* 50* 46* 48* 51*   CREATININE 3.7* 3.9* 3.8* 3.7* 3.9*   CALCIUM 7.3* 7.6* 7.7* 7.8* 8.1*   PHOS 6.2* 5.9* 6.2*  --   --      Immunosuppression Level - 9.5 (~9.5 hours)    Stop Diuril as patient with 5.9 liters UOP in the last 24 hours. Serum bicarbonate 28 this morning for which sodium bicarbonate stopped. Reduced dose of Prograf from 2.5 mg BID to 2 mg BID. No other related issues identified. Please call KTM as needed. We will continue to follow.    Víctor Enamorado MD  Nephrology

## 2023-03-01 NOTE — ASSESSMENT & PLAN NOTE
"- continue IV diuresis   - weaning supplemental O2 as tolerated  - see "acute hypoxic respiratory failure"    "

## 2023-03-01 NOTE — ASSESSMENT & PLAN NOTE
Acute hypoxic respiratory failure  -Unchanged prior to admission; Don't suspect acute covid  -CT chest w/o contrast showing BL pleural effusions; unable to perform thoracentesis due to insufficient fluid for safe procedure   - required BiPAP on 2/27 for pO2 of 44. Increased to lasix gtt  -tx underlying anemia - see below  - continue supportive care: albuterol prn, incentive spirometry, anti-tussive meds, chest PT, acapella, OOB to chair  -covid + on 2/7/23  - IMPROVING

## 2023-03-01 NOTE — PLAN OF CARE
Problem: Adult Inpatient Plan of Care  Goal: Plan of Care Review  Outcome: Ongoing, Progressing  Goal: Patient-Specific Goal (Individualized)  Outcome: Ongoing, Progressing  Goal: Absence of Hospital-Acquired Illness or Injury  Outcome: Ongoing, Progressing  Goal: Optimal Comfort and Wellbeing  Outcome: Ongoing, Progressing  Goal: Readiness for Transition of Care  Outcome: Ongoing, Progressing     Problem: Fluid and Electrolyte Imbalance (Acute Kidney Injury/Impairment)  Goal: Fluid and Electrolyte Balance  Outcome: Ongoing, Progressing     Problem: Oral Intake Inadequate (Acute Kidney Injury/Impairment)  Goal: Optimal Nutrition Intake  Outcome: Ongoing, Progressing     Problem: Renal Function Impairment (Acute Kidney Injury/Impairment)  Goal: Effective Renal Function  Outcome: Ongoing, Progressing     Problem: Infection  Goal: Absence of Infection Signs and Symptoms  Outcome: Ongoing, Progressing     Problem: Fall Injury Risk  Goal: Absence of Fall and Fall-Related Injury  Outcome: Ongoing, Progressing     Problem: Coping Ineffective  Goal: Effective Coping  Outcome: Ongoing, Progressing

## 2023-03-01 NOTE — SUBJECTIVE & OBJECTIVE
"Interval History:  Pt renal transplant x 2.     Past Medical History:   Diagnosis Date    (HFpEF) heart failure with preserved ejection fraction 9/25/2017    Atrial fibrillation     CAD (coronary artery disease)     CHF (congestive heart failure)     Chronic diastolic heart failure 4/8/2020    CKD (chronic kidney disease) stage 3, GFR 30-59 ml/min     Dr. Latif    CKD (chronic kidney disease) stage 3, GFR 30-59 ml/min     Diverticulosis     Fever blister     Heart attack 2006    Hyperlipidemia     Hypertension     Immunodeficiency due to treatment with immunosuppressive medication     Keloid cicatrix     Left lower quadrant abdominal pain 6/5/2022    Metabolic bone disease     Pericarditis     S/P kidney transplant     Supraventricular tachycardia 06/2019    Thrombocytopenia     Thyroid disease     Tobacco use 9/5/2014    Unspecified disorder of kidney and ureter     Stage IIICKD       Past Surgical History:   Procedure Laterality Date    CARDIOVERSION  04/30/15    CHOLECYSTECTOMY      COLONOSCOPY  April 20, 2011    Diverticulosis, repeat recommended in 3 yrs., repeat colonoscopy 2014 revealed 2 polyps.  He should return in 5 years.    COLONOSCOPY N/A 3/13/2020    Procedure: COLONOSCOPY;  Surgeon: Chon Casper MD;  Location: [facility] (4TH FLR);  Service: Endoscopy;  Laterality: N/A;  ok to hold coumadin x5days-see telephone encounter 2/4/20-tb    COLONOSCOPY N/A 2/4/2021    Procedure: COLONOSCOPY;  Surgeon: Chon Casper MD;  Location: [facility] (4TH FLR);  Service: Endoscopy;  Laterality: N/A;  Eliquis - per Dr. GAVIN Blunt ok to hold x 2 days and "restarted asap"- ERW  last prep "poor", [id number] ordered/ Pt requests Dr. Casper  prep ins. mailed - COVID screening on 2/1/21 PCW- ERW    COLONOSCOPY N/A 3/3/2021    Procedure: COLONOSCOPY;  Surgeon: Chon Casper MD;  Location: [facility] (4TH FLR);  Service: Endoscopy;  Laterality: N/A;  Patient with his second poor bowel pre and has poor prep today.  If " patient not intersted or can't get colonoscopy tomorrow will need constipation bowel prep and will need to restart his Eliquis today.Thanks,Chon     per Dr Blunt-ok to hold Eliquis 2 days prior      COVID     CORONARY ANGIOPLASTY WITH STENT PLACEMENT  9/13/2006    KIDNEY TRANSPLANT  2005    PARATHYROIDECTOMY      TREATMENT OF CARDIAC ARRHYTHMIA N/A 3/28/2022    Procedure: CARDIOVERSION;  Surgeon: Migue Blunt MD;  Location: [facility] EP [facility];  Service: Cardiology;  Laterality: N/A;  AF, DCC, MAC, GP, 3 PREP*RODRIGO deferred pt 100% compliant*       Review of patient's allergies indicates:   Allergen Reactions    Ace inhibitors Swelling    Verapamil Other (See Comments)     Other reaction(s): Unknown    Povidone-iodine Itching       Medications:  Continuous Infusions:   furosemide (LASIX) 10 mg/mL infusion (non-titrating) 15 mg/hr (03/01/23 0156)     Scheduled Meds:   acetaminophen  1,000 mg Oral TID    albuterol-ipratropium  3 mL Nebulization Q4H WAKE    apixaban  5 mg Oral BID    aspirin  81 mg Oral Daily    atorvastatin  40 mg Oral QHS    calcium acetate(phosphat bind)  1,334 mg Oral TID WM    colchicine  0.3 mg Oral Daily    divalproex  250 mg Oral BID    doxazosin  4 mg Oral Q12H    epoetin samara (PROCRIT) injection  20,000 Units Subcutaneous Q7 Days    famotidine  20 mg Oral Daily    fluticasone furoate-vilanteroL  1 puff Inhalation Daily    gabapentin  100 mg Oral QHS    hydrALAZINE  50 mg Oral TID    metoprolol succinate  25 mg Oral Daily    NIFEdipine  60 mg Oral BID    paricalcitoL  1 mcg Oral Daily    predniSONE  5 mg Oral Daily    tacrolimus  2 mg Oral BID     PRN Meds:sodium chloride, sodium chloride, albuterol, albuterol-ipratropium, clonazePAM, melatonin, traMADoL    Family History       Problem Relation (Age of Onset)    Heart disease Father    Kidney disease Sister, Brother    Kidney failure Sister, Brother    No Known Problems Sister, Brother, Sister, Sister    Thyroid disease Mother          Tobacco Use     Smoking status: Former     Packs/day: 0.50     Years: 40.00     Pack years: 20.00     Types: Cigarettes     Quit date: 2022     Years since quittin.0    Smokeless tobacco: Never    Tobacco comments:     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     Patient is currently retired   Substance and Sexual Activity    Alcohol use: No    Drug use: No    Sexual activity: Yes     Partners: Female       Review of Systems   Constitutional:  Positive for activity change and fatigue.   Respiratory:  Positive for shortness of breath.    Gastrointestinal:  Negative for abdominal distention.   Neurological:  Positive for weakness.   Psychiatric/Behavioral:  Positive for decreased concentration.    Objective:     Vital Signs (Most Recent):  Temp: 98.1 °F (36.7 °C) (23)  Pulse: 78 (23)  Resp: 18 (23)  BP: (!) 140/63 (23)  SpO2: 100 % (23)   Vital Signs (24h Range):  Temp:  [97.4 °F (36.3 °C)-98.6 °F (37 °C)] 98.1 °F (36.7 °C)  Pulse:  [69-84] 78  Resp:  [18-20] 18  SpO2:  [93 %-100 %] 100 %  BP: (140-160)/(63-74) 140/63     Weight: 108.4 kg (238 lb 15.7 oz)  Body mass index is 31.53 kg/m².    Physical Exam  Constitutional:       General: He is not in acute distress.     Appearance: He is well-developed.      Comments: Sitter cam at bedside   HENT:      Head: Normocephalic and atraumatic.   Eyes:      General: Lids are normal.      Conjunctiva/sclera: Conjunctivae normal.   Cardiovascular:      Rate and Rhythm: Normal rate and regular rhythm.   Pulmonary:      Effort: Pulmonary effort is normal. No respiratory distress.   Abdominal:      Tenderness: There is no abdominal tenderness.   Musculoskeletal:      Cervical back: Full passive range of motion without pain and normal range of motion.      Comments: Normal ROM   Skin:     General: Skin is warm and dry.   Neurological:      Mental Status: He is alert and oriented to person, place,  and time.   Psychiatric:         Attention and Perception: He is inattentive.       Review of Symptoms      Symptom Assessment (ESAS 0-10 Scale)  Pain:  0  Dyspnea:  0  Anxiety:  0  Nausea:  0  Depression:  0  Anorexia:  0  Fatigue:  0  Insomnia:  0  Restlessness:  0  Agitation:  0         Performance Status:  60    Living Arrangements:  Lives with family    Psychosocial/Cultural:   See Palliative Psychosocial Note: No  Pt  to spouse.  Pt had renal transplant x 2. Pt wife has been caregiver.   **Primary  to Follow**  Palliative Care  Consult: No      Advance Care Planning   Advance Directives:   Living Will: No    Do Not Resuscitate Status: No    Medical Power of : No    Agent's Name:  Pt's wife is NOK    Decision Making:  Patient answered questions and Family answered questions  Goals of Care: The patient and family endorses that what is most important right now is to focus on improvement in condition but with limits to invasive therapies    Accordingly, we have decided that the best plan to meet the patient's goals includes continuing with treatment       Significant Labs: All pertinent labs within the past 24 hours have been reviewed.  CBC:   Recent Labs   Lab 03/01/23 0224   WBC 7.38   HGB 7.9*   HCT 25.7*   MCV 80*        BMP:  Recent Labs   Lab 03/01/23 0224   GLU 83      K 3.7      CO2 28   BUN 51*   CREATININE 3.9*   CALCIUM 8.1*     LFT:  Lab Results   Component Value Date    AST 25 02/27/2023    ALKPHOS 78 02/27/2023    BILITOT 0.4 02/27/2023     Albumin:   Albumin   Date Value Ref Range Status   02/27/2023 2.2 (L) 3.5 - 5.2 g/dL Final     Protein:   Total Protein   Date Value Ref Range Status   02/27/2023 7.3 6.0 - 8.4 g/dL Final     Lactic acid:   Lab Results   Component Value Date    LACTATE 1.5 02/22/2023    LACTATE 1.9 02/20/2023       Significant Imaging: I have reviewed all pertinent imaging results/findings within the past 24 hours.

## 2023-03-01 NOTE — ASSESSMENT & PLAN NOTE
- Initially held home dosing of p.r.n. Lasix given KATINA and did trial of IVF but no improvement in Scr  - nephrology following   - started trial of IV lasix on 2/23 with improvement in Scr. Started on lasix gtt on 2/27. Added IV diuril BID on 2/28 per nephro recs  - continue home metoprolol  - followup BMP

## 2023-03-01 NOTE — CONSULTS
[facility] - Intensive Care ([place] [id number])  Palliative Medicine  Consult Note    Patient Name: Ibrahima Phelps  MRN: [id number]  Admission Date: 2/20/2023  Hospital Length of Stay: 9 days  Code Status: Full Code   Attending Provider: Morris Payan MD  Consulting Provider: TRUDI Garzon  Primary Care Physician: Connie Magdaleno MD  Principal Problem:Acute kidney injury superimposed on CKD    Patient information was obtained from patient, spouse/SO and primary team.      Inpatient consult to Palliative Care  Consult performed by: TRUDI Collazo  Consult ordered by: Morris Payan MD        Assessment/Plan:     Palliative Care  Palliative care encounter  Impression: Pt is a 66 y/o male with renal transplant who was admitted for acute on chronic kidney injury. Pt had kidney transplant times 2- first one in 1992 and then again in 2005. Pt is alert, oriented to person, place, and situation for most part. Pt has sitter cam at bedside. Pt is a full code.     Reason for consult: GOC/ACP. Pt has KATINA on CKD. Pt does not want dialysis if needed in future.     Goals of care/ACP:   Met with pt and pt's wife. Pt and wife aware pt having acute kidney injury on CKD. Per pt, he would never want to go back on dialysis if needed in future. Pt's wife is aware. Pt concerned his wishes may not be honored.  Explained to pt and wife, that is pt's goal is not to have dialysis, his wishes would be honored. Discussed with pt and wife, in future if pt needs dialysis and pt does not want it, focus would change to comfort/QOL. Hospice care discussed.     At this time, plan is to continue medical management. Pal care will follow along   MPOA: Pt's wife is NOK.     Symptom management:     Debility:   PT/Ot seeing pt.   SNF recommended.     Delirium  Pt is alert and oriented to person, place, and situation for most part.   - started depakote. Weaned down to depakote 250 mg nightly   - delirium precautions    Pt denies pain,  anxiety.     Plan:  Pal care will continue to follow for ACP.                     Thank you for your consult. I will follow-up with patient. Please contact us if you have any additional questions.    Subjective:     HPI:   Pt is a 67-year-old black male transplant kidney patient being admitted for KATINA.  Patient was just discharged from a hospitalization for COVID pneumonia on 02/10/2023.  He also paid in ED visit a few days ago for what could have been a mild cellulitis/folliculitis on his foot for which he is almost done with his Keflex regimen.      Per chart review, patient was instructed to come to the ED this time by the COVID surveillance nurses due to desaturation down to 88%.  When I talked with the patient and his wife in detail, they affirmed that he was not having any new or worsening shortness of breath since his discharge.  His wife did report that once the patient woke up/sat up his desaturation spontaneously resolved.  Patient denies any steven current shortness of breath or chest pain.  No nausea vomiting or flu fever like feeling.  In my discussion with the ED provider who was noted that the patient was complaining of shortness of breath but there was a concern that this likely was anxiety triggered.  Patient seems to be saturating well on room air here in the ED and chest x-ray which I personally reviewed is clear bilaterally.  Wife reports patient has been compliant with his medications.    The blood work did demonstrate an acutely elevated creatinine of 3.8 whereas his baseline       Hospital Course:  No notes on file    Interval History:  Pt renal transplant x 2.     Past Medical History:   Diagnosis Date    (HFpEF) heart failure with preserved ejection fraction 9/25/2017    Atrial fibrillation     CAD (coronary artery disease)     CHF (congestive heart failure)     Chronic diastolic heart failure 4/8/2020    CKD (chronic kidney disease) stage 3, GFR 30-59 ml/min     Dr. Latif    CKD  "(chronic kidney disease) stage 3, GFR 30-59 ml/min     Diverticulosis     Fever blister     Heart attack 2006    Hyperlipidemia     Hypertension     Immunodeficiency due to treatment with immunosuppressive medication     Keloid cicatrix     Left lower quadrant abdominal pain 6/5/2022    Metabolic bone disease     Pericarditis     S/P kidney transplant     Supraventricular tachycardia 06/2019    Thrombocytopenia     Thyroid disease     Tobacco use 9/5/2014    Unspecified disorder of kidney and ureter     Stage IIICKD       Past Surgical History:   Procedure Laterality Date    CARDIOVERSION  04/30/15    CHOLECYSTECTOMY      COLONOSCOPY  April 20, 2011    Diverticulosis, repeat recommended in 3 yrs., repeat colonoscopy 2014 revealed 2 polyps.  He should return in 5 years.    COLONOSCOPY N/A 3/13/2020    Procedure: COLONOSCOPY;  Surgeon: Chon Casper MD;  Location: [facility] (4TH FLR);  Service: Endoscopy;  Laterality: N/A;  ok to hold coumadin x5days-see telephone encounter 2/4/20-tb    COLONOSCOPY N/A 2/4/2021    Procedure: COLONOSCOPY;  Surgeon: Chon Casper MD;  Location: [facility] (4TH FLR);  Service: Endoscopy;  Laterality: N/A;  Eliquis - per Dr. GAVIN Blunt ok to hold x 2 days and "restarted asap"- ERW  last prep "poor", [id number] ordered/ Pt requests Dr. Casper  prep ins. mailed - COVID screening on 2/1/21 PCW- ERW    COLONOSCOPY N/A 3/3/2021    Procedure: COLONOSCOPY;  Surgeon: Chon Casper MD;  Location: [facility] (4TH FLR);  Service: Endoscopy;  Laterality: N/A;  Patient with his second poor bowel pre and has poor prep today.  If patient not intersted or can't get colonoscopy tomorrow will need constipation bowel prep and will need to restart his Eliquis today.Thanks,Chon     per Dr Blunt-ok to hold Eliquis 2 days prior      COVID     CORONARY ANGIOPLASTY WITH STENT PLACEMENT  9/13/2006    KIDNEY TRANSPLANT  2005    PARATHYROIDECTOMY      TREATMENT OF CARDIAC ARRHYTHMIA " N/A 3/28/2022    Procedure: CARDIOVERSION;  Surgeon: Migue Blunt MD;  Location: [facility];  Service: Cardiology;  Laterality: N/A;  AF, DCC, MAC, GP, 3 PREP*RODRIGO deferred pt 100% compliant*       Review of patient's allergies indicates:   Allergen Reactions    Ace inhibitors Swelling    Verapamil Other (See Comments)     Other reaction(s): Unknown    Povidone-iodine Itching       Medications:  Continuous Infusions:   furosemide (LASIX) 10 mg/mL infusion (non-titrating) 15 mg/hr (23 0156)     Scheduled Meds:   acetaminophen  1,000 mg Oral TID    albuterol-ipratropium  3 mL Nebulization Q4H WAKE    apixaban  5 mg Oral BID    aspirin  81 mg Oral Daily    atorvastatin  40 mg Oral QHS    calcium acetate(phosphat bind)  1,334 mg Oral TID WM    colchicine  0.3 mg Oral Daily    divalproex  250 mg Oral BID    doxazosin  4 mg Oral Q12H    epoetin samara (PROCRIT) injection  20,000 Units Subcutaneous Q7 Days    famotidine  20 mg Oral Daily    fluticasone furoate-vilanteroL  1 puff Inhalation Daily    gabapentin  100 mg Oral QHS    hydrALAZINE  50 mg Oral TID    metoprolol succinate  25 mg Oral Daily    NIFEdipine  60 mg Oral BID    paricalcitoL  1 mcg Oral Daily    predniSONE  5 mg Oral Daily    tacrolimus  2 mg Oral BID     PRN Meds:sodium chloride, sodium chloride, albuterol, albuterol-ipratropium, clonazePAM, melatonin, traMADoL    Family History       Problem Relation (Age of Onset)    Heart disease Father    Kidney disease Sister, Brother    Kidney failure Sister, Brother    No Known Problems Sister, Brother, Sister, Sister    Thyroid disease Mother          Tobacco Use    Smoking status: Former     Packs/day: 0.50     Years: 40.00     Pack years: 20.00     Types: Cigarettes     Quit date: 2022     Years since quittin.0    Smokeless tobacco: Never    Tobacco comments:     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     Patient is currently  retired   Substance and Sexual Activity    Alcohol use: No    Drug use: No    Sexual activity: Yes     Partners: Female       Review of Systems   Constitutional:  Positive for activity change and fatigue.   Respiratory:  Positive for shortness of breath.    Gastrointestinal:  Negative for abdominal distention.   Neurological:  Positive for weakness.   Psychiatric/Behavioral:  Positive for decreased concentration.    Objective:     Vital Signs (Most Recent):  Temp: 98.1 °F (36.7 °C) (03/01/23 0828)  Pulse: 78 (03/01/23 0828)  Resp: 18 (03/01/23 0828)  BP: (!) 140/63 (03/01/23 0828)  SpO2: 100 % (03/01/23 0828)   Vital Signs (24h Range):  Temp:  [97.4 °F (36.3 °C)-98.6 °F (37 °C)] 98.1 °F (36.7 °C)  Pulse:  [69-84] 78  Resp:  [18-20] 18  SpO2:  [93 %-100 %] 100 %  BP: (140-160)/(63-74) 140/63     Weight: 108.4 kg (238 lb 15.7 oz)  Body mass index is 31.53 kg/m².    Physical Exam  Constitutional:       General: He is not in acute distress.     Appearance: He is well-developed.      Comments: Sitter cam at bedside   HENT:      Head: Normocephalic and atraumatic.   Eyes:      General: Lids are normal.      Conjunctiva/sclera: Conjunctivae normal.   Cardiovascular:      Rate and Rhythm: Normal rate and regular rhythm.   Pulmonary:      Effort: Pulmonary effort is normal. No respiratory distress.   Abdominal:      Tenderness: There is no abdominal tenderness.   Musculoskeletal:      Cervical back: Full passive range of motion without pain and normal range of motion.      Comments: Normal ROM   Skin:     General: Skin is warm and dry.   Neurological:      Mental Status: He is alert and oriented to person, place, and time.   Psychiatric:         Attention and Perception: He is inattentive.       Review of Symptoms      Symptom Assessment (ESAS 0-10 Scale)  Pain:  0  Dyspnea:  0  Anxiety:  0  Nausea:  0  Depression:  0  Anorexia:  0  Fatigue:  0  Insomnia:  0  Restlessness:  0  Agitation:  0         Performance Status:   60    Living Arrangements:  Lives with family    Psychosocial/Cultural:   See Palliative Psychosocial Note: No  Pt  to spouse.  Pt had renal transplant x 2. Pt wife has been caregiver.   **Primary  to Follow**  Palliative Care  Consult: No      Advance Care Planning   Advance Directives:   Living Will: No    Do Not Resuscitate Status: No    Medical Power of : No    Agent's Name:  Pt's wife is NOK    Decision Making:  Patient answered questions and Family answered questions  Goals of Care: The patient and family endorses that what is most important right now is to focus on improvement in condition but with limits to invasive therapies    Accordingly, we have decided that the best plan to meet the patient's goals includes continuing with treatment       Significant Labs: All pertinent labs within the past 24 hours have been reviewed.  CBC:   Recent Labs   Lab 03/01/23 0224   WBC 7.38   HGB 7.9*   HCT 25.7*   MCV 80*        BMP:  Recent Labs   Lab 03/01/23 0224   GLU 83      K 3.7      CO2 28   BUN 51*   CREATININE 3.9*   CALCIUM 8.1*     LFT:  Lab Results   Component Value Date    AST 25 02/27/2023    ALKPHOS 78 02/27/2023    BILITOT 0.4 02/27/2023     Albumin:   Albumin   Date Value Ref Range Status   02/27/2023 2.2 (L) 3.5 - 5.2 g/dL Final     Protein:   Total Protein   Date Value Ref Range Status   02/27/2023 7.3 6.0 - 8.4 g/dL Final     Lactic acid:   Lab Results   Component Value Date    LACTATE 1.5 02/22/2023    LACTATE 1.9 02/20/2023       Significant Imaging: I have reviewed all pertinent imaging results/findings within the past 24 hours.        > 50% of 75 min visit spent in chart review, face to face discussion of goals of care,  symptom assessment, coordination of care and emotional support.    Lindsey Skelton, CNS  Palliative Medicine  [facility] ([place] [id number])

## 2023-03-01 NOTE — ASSESSMENT & PLAN NOTE
- suspect may be 2/2 gout flare  - h/o gout   - followup Xray right ankle negative   - uric acid elevated at 14.5  - colchicine 0.3 mg daily due to KATINA  - continue to monitor   - IMPROVING

## 2023-03-01 NOTE — SUBJECTIVE & OBJECTIVE
Interval History: Patient lying in bed, no acute distress. Wife at bedside. Overnight, patient had mechanical fall by tripping over the tele wires and didn't hit his head. CTH negative for acute changes. Diuresing well on IV lasix gtt. Per nephrology, increased lasix gtt to 15 mg/hr and added IV diuril 500 mg BID. SOB improving and weaned from 2.5 to 2 L NC. CT A/P negative for bleed so resumed home eliquis. Hb improved from 6.3 to 7.3 today after blood transfusion on 2/27. Scr improved from 3.8 to 3.7. Nephrology discussed POC with patient and spouse including potential need for dialysis if renal function does not improve with aggressive diuresis. At this time, patient prefers not to pursue dialysis if it is recommended in the future, so palliative care consulted to assist with GOC discussion and advance care planning.     Intake/Output Summary (Last 24 hours) at 2/28/2023 1903  Last data filed at 2/28/2023 1644  Gross per 24 hour   Intake 660 ml   Output 2900 ml   Net -2240 ml           Review of Systems   Constitutional:  Positive for activity change and fatigue. Negative for chills and fever.   HENT:  Positive for voice change. Negative for congestion and trouble swallowing.    Eyes:  Negative for visual disturbance.   Respiratory:  Positive for cough and shortness of breath.    Cardiovascular:  Negative for chest pain and leg swelling.   Gastrointestinal:  Negative for abdominal distention, abdominal pain, nausea and vomiting.   Endocrine: Negative for cold intolerance, heat intolerance, polydipsia and polyuria.   Genitourinary:  Negative for difficulty urinating and dysuria.        Urinary incontinence   Musculoskeletal:  Positive for arthralgias (right ankle). Negative for back pain and myalgias.   Skin:  Negative for rash and wound.   Neurological:  Positive for weakness. Negative for dizziness and light-headedness.   Hematological:  Negative for adenopathy. Does not bruise/bleed easily.    Psychiatric/Behavioral:  Negative for confusion and sleep disturbance.      Objective:     Vital Signs (Most Recent):  Temp: 98.5 °F (36.9 °C) (02/28/23 1707)  Pulse: 77 (02/28/23 1707)  Resp: 20 (02/28/23 1707)  BP: (!) 160/72 (02/28/23 1707)  SpO2: 98 % (02/28/23 1707)   Vital Signs (24h Range):  Temp:  [97.5 °F (36.4 °C)-98.6 °F (37 °C)] 98.5 °F (36.9 °C)  Pulse:  [66-86] 77  Resp:  [18-20] 20  SpO2:  [92 %-99 %] 98 %  BP: (127-160)/(58-74) 160/72     Weight: 108.4 kg (238 lb 15.7 oz)  Body mass index is 31.53 kg/m².    Intake/Output Summary (Last 24 hours) at 2/28/2023 1903  Last data filed at 2/28/2023 1644  Gross per 24 hour   Intake 660 ml   Output 2900 ml   Net -2240 ml        Physical Exam  Constitutional:       Appearance: He is well-developed.   HENT:      Head: Normocephalic and atraumatic.      Mouth/Throat:      Mouth: Mucous membranes are moist.      Comments: hoarseness  Eyes:      General: No scleral icterus.     Extraocular Movements: Extraocular movements intact.      Pupils: Pupils are equal, round, and reactive to light.   Neck:      Vascular: No JVD.   Cardiovascular:      Rate and Rhythm: Normal rate and regular rhythm.      Heart sounds: No murmur heard.    No friction rub. No gallop.   Pulmonary:      Effort: Pulmonary effort is normal. No respiratory distress.      Breath sounds: Examination of the right-lower field reveals decreased breath sounds. Examination of the left-lower field reveals decreased breath sounds. Decreased breath sounds, wheezing and rhonchi present. No rales.   Abdominal:      General: Bowel sounds are normal. There is no distension.      Palpations: Abdomen is soft.      Tenderness: There is no abdominal tenderness.   Musculoskeletal:         General: Tenderness (right ankle) present. No deformity. Normal range of motion.      Cervical back: Neck supple.   Lymphadenopathy:      Cervical: No cervical adenopathy.   Skin:     General: Skin is warm and dry.       Capillary Refill: Capillary refill takes less than 2 seconds.      Findings: No erythema or rash.   Neurological:      General: No focal deficit present.      Mental Status: He is alert and oriented to person, place, and time.      Cranial Nerves: No cranial nerve deficit.      Sensory: No sensory deficit.      Motor: Weakness present.   Psychiatric:         Mood and Affect: Mood normal.       Significant Labs: A1C:   Recent Labs   Lab 09/08/22  0909 01/04/23  0819   HGBA1C 5.4 5.5       CBC:   Recent Labs   Lab 02/27/23  0051 02/27/23  0246 02/27/23  0549 02/27/23  0623 02/28/23  0440   WBC 10.80 10.35  --   --  8.76   HGB 6.8* 6.6*  --   --  7.3*   HCT 23.3* 23.1* 20* 22* 24.1*    230  --   --  231       CMP:   Recent Labs   Lab 02/27/23 0051 02/27/23  0246 02/28/23  0440    138 140   K 4.0 4.7 4.2    104 104   CO2 22* 22* 22*   * 89 84   BUN 50* 46* 48*   CREATININE 3.9* 3.8* 3.7*   CALCIUM 7.6* 7.7* 7.8*   PROT 7.3  --   --    ALBUMIN 2.2*  --   --    BILITOT 0.4  --   --    ALKPHOS 78  --   --    AST 25  --   --    ALT 20  --   --    ANIONGAP 12 12 14       Coagulation: No results for input(s): PT, INR, APTT in the last 48 hours.  Lactic Acid:   No results for input(s): LACTATE in the last 48 hours.        Significant Imaging: I have reviewed all pertinent imaging results/findings within the past 24 hours.

## 2023-03-02 PROBLEM — D63.1 ANEMIA DUE TO STAGE 4 CHRONIC KIDNEY DISEASE: Status: RESOLVED | Noted: 2022-06-05 | Resolved: 2023-03-02

## 2023-03-02 PROBLEM — N18.4 ANEMIA DUE TO STAGE 4 CHRONIC KIDNEY DISEASE: Status: RESOLVED | Noted: 2022-06-05 | Resolved: 2023-03-02

## 2023-03-02 PROBLEM — J96.01 ACUTE HYPOXEMIC RESPIRATORY FAILURE: Status: RESOLVED | Noted: 2023-02-28 | Resolved: 2023-03-02

## 2023-03-02 PROBLEM — E83.51 HYPOCALCEMIA: Status: RESOLVED | Noted: 2023-02-08 | Resolved: 2023-03-02

## 2023-03-02 PROBLEM — N18.9 ANEMIA OF RENAL DISEASE: Status: RESOLVED | Noted: 2023-02-22 | Resolved: 2023-03-02

## 2023-03-02 PROBLEM — L03.119 CELLULITIS OF FOOT: Status: RESOLVED | Noted: 2023-02-20 | Resolved: 2023-03-02

## 2023-03-02 PROBLEM — R06.2 WHEEZING: Status: RESOLVED | Noted: 2023-02-22 | Resolved: 2023-03-02

## 2023-03-02 PROBLEM — R09.02 HYPOXIA: Status: RESOLVED | Noted: 2023-02-26 | Resolved: 2023-03-02

## 2023-03-02 PROBLEM — R53.81 DEBILITY: Status: RESOLVED | Noted: 2022-06-08 | Resolved: 2023-03-02

## 2023-03-02 PROBLEM — D63.1 ANEMIA OF RENAL DISEASE: Status: RESOLVED | Noted: 2023-02-22 | Resolved: 2023-03-02

## 2023-03-02 LAB
ANION GAP SERPL CALC-SCNC: 11 MMOL/L (ref 8–16)
BILIRUB UR QL STRIP: NEGATIVE
BUN SERPL-MCNC: 45 MG/DL (ref 8–23)
C DIFF GDH STL QL: NEGATIVE
C DIFF TOX A+B STL QL IA: NEGATIVE
CALCIUM SERPL-MCNC: 8.7 MG/DL (ref 8.7–10.5)
CHLORIDE SERPL-SCNC: 97 MMOL/L (ref 95–110)
CLARITY UR REFRACT.AUTO: CLEAR
CO2 SERPL-SCNC: 33 MMOL/L (ref 23–29)
COLOR UR AUTO: COLORLESS
CREAT SERPL-MCNC: 3.9 MG/DL (ref 0.5–1.4)
ERYTHROCYTE [DISTWIDTH] IN BLOOD BY AUTOMATED COUNT: 16.2 % (ref 11.5–14.5)
EST. GFR  (NO RACE VARIABLE): 16 ML/MIN/1.73 M^2
GLUCOSE SERPL-MCNC: 63 MG/DL (ref 70–110)
GLUCOSE UR QL STRIP: NEGATIVE
HCT VFR BLD AUTO: 28.5 % (ref 40–54)
HGB BLD-MCNC: 8.6 G/DL (ref 14–18)
HGB UR QL STRIP: ABNORMAL
KETONES UR QL STRIP: ABNORMAL
LEUKOCYTE ESTERASE UR QL STRIP: ABNORMAL
MCH RBC QN AUTO: 24.4 PG (ref 27–31)
MCHC RBC AUTO-ENTMCNC: 30.2 G/DL (ref 32–36)
MCV RBC AUTO: 81 FL (ref 82–98)
MICROSCOPIC COMMENT: ABNORMAL
NITRITE UR QL STRIP: NEGATIVE
PH UR STRIP: 7 [PH] (ref 5–8)
PLATELET # BLD AUTO: 282 K/UL (ref 150–450)
PMV BLD AUTO: 11.2 FL (ref 9.2–12.9)
POCT GLUCOSE: 79 MG/DL (ref 70–110)
POTASSIUM SERPL-SCNC: 3.3 MMOL/L (ref 3.5–5.1)
PROT UR QL STRIP: ABNORMAL
RBC # BLD AUTO: 3.53 M/UL (ref 4.6–6.2)
RBC #/AREA URNS AUTO: 29 /HPF (ref 0–4)
SODIUM SERPL-SCNC: 141 MMOL/L (ref 136–145)
SP GR UR STRIP: 1.01 (ref 1–1.03)
TACROLIMUS BLD-MCNC: 6.3 NG/ML (ref 5–15)
URN SPEC COLLECT METH UR: ABNORMAL
WBC # BLD AUTO: 6.89 K/UL (ref 3.9–12.7)
WBC #/AREA URNS AUTO: 3 /HPF (ref 0–5)

## 2023-03-02 PROCEDURE — 63600175 PHARM REV CODE 636 W HCPCS: Performed by: INTERNAL MEDICINE

## 2023-03-02 PROCEDURE — 99233 PR SUBSEQUENT HOSPITAL CARE,LEVL III: ICD-10-PCS | Mod: GC,,, | Performed by: INTERNAL MEDICINE

## 2023-03-02 PROCEDURE — 97530 THERAPEUTIC ACTIVITIES: CPT | Mod: CQ

## 2023-03-02 PROCEDURE — 87449 NOS EACH ORGANISM AG IA: CPT | Performed by: HOSPITALIST

## 2023-03-02 PROCEDURE — 94640 AIRWAY INHALATION TREATMENT: CPT

## 2023-03-02 PROCEDURE — 99232 PR SUBSEQUENT HOSPITAL CARE,LEVL II: ICD-10-PCS | Mod: ,,, | Performed by: HOSPITALIST

## 2023-03-02 PROCEDURE — 99233 SBSQ HOSP IP/OBS HIGH 50: CPT | Mod: GC,,, | Performed by: INTERNAL MEDICINE

## 2023-03-02 PROCEDURE — 99233 SBSQ HOSP IP/OBS HIGH 50: CPT | Mod: ,,, | Performed by: CLINICAL NURSE SPECIALIST

## 2023-03-02 PROCEDURE — 94761 N-INVAS EAR/PLS OXIMETRY MLT: CPT

## 2023-03-02 PROCEDURE — 63600175 PHARM REV CODE 636 W HCPCS: Performed by: HOSPITALIST

## 2023-03-02 PROCEDURE — 99232 SBSQ HOSP IP/OBS MODERATE 35: CPT | Mod: ,,, | Performed by: HOSPITALIST

## 2023-03-02 PROCEDURE — 97110 THERAPEUTIC EXERCISES: CPT | Mod: CQ

## 2023-03-02 PROCEDURE — 81001 URINALYSIS AUTO W/SCOPE: CPT | Performed by: INTERNAL MEDICINE

## 2023-03-02 PROCEDURE — 12000002 HC ACUTE/MED SURGE SEMI-PRIVATE ROOM

## 2023-03-02 PROCEDURE — 27000646 HC AEROBIKA DEVICE

## 2023-03-02 PROCEDURE — 25000242 PHARM REV CODE 250 ALT 637 W/ HCPCS: Performed by: INTERNAL MEDICINE

## 2023-03-02 PROCEDURE — 80197 ASSAY OF TACROLIMUS: CPT | Performed by: HOSPITALIST

## 2023-03-02 PROCEDURE — 85027 COMPLETE CBC AUTOMATED: CPT | Performed by: INTERNAL MEDICINE

## 2023-03-02 PROCEDURE — 80048 BASIC METABOLIC PNL TOTAL CA: CPT | Performed by: INTERNAL MEDICINE

## 2023-03-02 PROCEDURE — 94668 MNPJ CHEST WALL SBSQ: CPT

## 2023-03-02 PROCEDURE — 25000003 PHARM REV CODE 250: Performed by: INTERNAL MEDICINE

## 2023-03-02 PROCEDURE — 25000003 PHARM REV CODE 250: Performed by: HOSPITALIST

## 2023-03-02 PROCEDURE — 27000207 HC ISOLATION

## 2023-03-02 PROCEDURE — 99900035 HC TECH TIME PER 15 MIN (STAT)

## 2023-03-02 PROCEDURE — 99233 PR SUBSEQUENT HOSPITAL CARE,LEVL III: ICD-10-PCS | Mod: ,,, | Performed by: CLINICAL NURSE SPECIALIST

## 2023-03-02 PROCEDURE — 94664 DEMO&/EVAL PT USE INHALER: CPT

## 2023-03-02 PROCEDURE — 36415 COLL VENOUS BLD VENIPUNCTURE: CPT | Performed by: INTERNAL MEDICINE

## 2023-03-02 RX ORDER — OLANZAPINE 10 MG/2ML
2.5 INJECTION, POWDER, FOR SOLUTION INTRAMUSCULAR ONCE AS NEEDED
Status: DISCONTINUED | OUTPATIENT
Start: 2023-03-02 | End: 2023-03-06 | Stop reason: HOSPADM

## 2023-03-02 RX ORDER — FUROSEMIDE 10 MG/ML
80 INJECTION INTRAMUSCULAR; INTRAVENOUS
Status: DISCONTINUED | OUTPATIENT
Start: 2023-03-02 | End: 2023-03-05

## 2023-03-02 RX ADMIN — IPRATROPIUM BROMIDE AND ALBUTEROL SULFATE 3 ML: 2.5; .5 SOLUTION RESPIRATORY (INHALATION) at 03:03

## 2023-03-02 RX ADMIN — HYDRALAZINE HYDROCHLORIDE 50 MG: 25 TABLET, FILM COATED ORAL at 09:03

## 2023-03-02 RX ADMIN — ATORVASTATIN CALCIUM 40 MG: 20 TABLET, FILM COATED ORAL at 09:03

## 2023-03-02 RX ADMIN — PREDNISONE 5 MG: 5 TABLET ORAL at 10:03

## 2023-03-02 RX ADMIN — ASPIRIN 81 MG: 81 TABLET, COATED ORAL at 10:03

## 2023-03-02 RX ADMIN — IPRATROPIUM BROMIDE AND ALBUTEROL SULFATE 3 ML: 2.5; .5 SOLUTION RESPIRATORY (INHALATION) at 07:03

## 2023-03-02 RX ADMIN — ACETAMINOPHEN 1000 MG: 500 TABLET ORAL at 09:03

## 2023-03-02 RX ADMIN — HYDRALAZINE HYDROCHLORIDE 50 MG: 25 TABLET, FILM COATED ORAL at 10:03

## 2023-03-02 RX ADMIN — DOXAZOSIN 4 MG: 2 TABLET ORAL at 10:03

## 2023-03-02 RX ADMIN — GABAPENTIN 100 MG: 100 CAPSULE ORAL at 09:03

## 2023-03-02 RX ADMIN — TACROLIMUS 2 MG: 1 CAPSULE ORAL at 04:03

## 2023-03-02 RX ADMIN — APIXABAN 5 MG: 5 TABLET, FILM COATED ORAL at 10:03

## 2023-03-02 RX ADMIN — DOXAZOSIN 4 MG: 2 TABLET ORAL at 09:03

## 2023-03-02 RX ADMIN — IPRATROPIUM BROMIDE AND ALBUTEROL SULFATE 3 ML: 2.5; .5 SOLUTION RESPIRATORY (INHALATION) at 10:03

## 2023-03-02 RX ADMIN — CALCIUM ACETATE 1334 MG: 667 CAPSULE ORAL at 10:03

## 2023-03-02 RX ADMIN — ACETAMINOPHEN 1000 MG: 500 TABLET ORAL at 10:03

## 2023-03-02 RX ADMIN — NIFEDIPINE 60 MG: 30 TABLET, FILM COATED, EXTENDED RELEASE ORAL at 09:03

## 2023-03-02 RX ADMIN — TACROLIMUS 2 MG: 1 CAPSULE ORAL at 10:03

## 2023-03-02 RX ADMIN — FAMOTIDINE 20 MG: 20 TABLET ORAL at 10:03

## 2023-03-02 RX ADMIN — COLCHICINE 0.3 MG: 0.6 TABLET, FILM COATED ORAL at 10:03

## 2023-03-02 RX ADMIN — FUROSEMIDE 10 MG/HR: 10 INJECTION, SOLUTION INTRAMUSCULAR; INTRAVENOUS at 01:03

## 2023-03-02 RX ADMIN — OLANZAPINE 10 MG: 7.5 TABLET, FILM COATED ORAL at 09:03

## 2023-03-02 RX ADMIN — HYDRALAZINE HYDROCHLORIDE 50 MG: 25 TABLET, FILM COATED ORAL at 04:03

## 2023-03-02 RX ADMIN — FUROSEMIDE 80 MG: 10 INJECTION, SOLUTION INTRAMUSCULAR; INTRAVENOUS at 06:03

## 2023-03-02 RX ADMIN — METOPROLOL SUCCINATE 25 MG: 25 TABLET, EXTENDED RELEASE ORAL at 10:03

## 2023-03-02 RX ADMIN — CALCIUM ACETATE 1334 MG: 667 CAPSULE ORAL at 04:03

## 2023-03-02 RX ADMIN — PARICALCITOL 1 MCG: 1 CAPSULE, LIQUID FILLED ORAL at 10:03

## 2023-03-02 RX ADMIN — NIFEDIPINE 60 MG: 30 TABLET, FILM COATED, EXTENDED RELEASE ORAL at 10:03

## 2023-03-02 RX ADMIN — DIVALPROEX SODIUM 250 MG: 250 TABLET, DELAYED RELEASE ORAL at 06:03

## 2023-03-02 RX ADMIN — FLUTICASONE FUROATE AND VILANTEROL TRIFENATATE 1 PUFF: 100; 25 POWDER RESPIRATORY (INHALATION) at 07:03

## 2023-03-02 RX ADMIN — APIXABAN 5 MG: 5 TABLET, FILM COATED ORAL at 09:03

## 2023-03-02 NOTE — ASSESSMENT & PLAN NOTE
"Patient with Hypoxic Respiratory failure which is Acute.  he is not on home oxygen. Supplemental oxygen was provided and noted-  .   Signs/symptoms of respiratory failure include- tachypnea, increased work of breathing, respiratory distress, wheezing and lethargy. Contributing diagnoses includes - CHF, Obesity Hypoventilation and Pleural effusion Labs and images were reviewed. Patient Has recent ABG, which has been reviewed. Will treat underlying causes and adjust management of respiratory failure.    PLAN:  - h/o COVID 2/7/2023  - continue scheduled and prn duonebs  - weaned to room air  - continue supportive care: chest PT, acapella, IS, OOB  - continue CHF and KATINA management   - continue IV diuresis   - see "chronic dyspnea"  - IMPROVING  "

## 2023-03-02 NOTE — SUBJECTIVE & OBJECTIVE
Interval History: Patient lying in bed, no acute distress. Wife at bedside. Overnight, patient's wife noted that he was more anxious and restless. Resumed prior depakote dose to assist with suspected delirium. Increased UOP with IV lasix gtt. Scr increased slightly from 3.7 to 3.9. Diuril discontinued and lasix gtt reduced to 10 mg/hr. Patient weaned to room air. Right ankle pain improving from gout flare. KTM and Palliative care following.     Intake/Output Summary (Last 24 hours) at 3/2/2023 0750  Last data filed at 3/2/2023 0529  Gross per 24 hour   Intake --   Output 5400 ml   Net -5400 ml           Review of Systems   Constitutional:  Positive for activity change and fatigue. Negative for chills and fever.   HENT:  Negative for congestion and trouble swallowing.    Eyes:  Negative for visual disturbance.   Respiratory:  Positive for cough and shortness of breath.    Cardiovascular:  Negative for chest pain and leg swelling.   Gastrointestinal:  Negative for abdominal distention, abdominal pain, nausea and vomiting.   Endocrine: Negative for cold intolerance, heat intolerance, polydipsia and polyuria.   Genitourinary:  Negative for difficulty urinating and dysuria.        Urinary incontinence   Musculoskeletal:  Positive for arthralgias (right ankle). Negative for back pain and myalgias.   Skin:  Negative for rash and wound.   Neurological:  Positive for weakness. Negative for dizziness and light-headedness.   Hematological:  Negative for adenopathy. Does not bruise/bleed easily.   Psychiatric/Behavioral:  Negative for confusion and sleep disturbance.      Objective:     Vital Signs (Most Recent):  Temp: 98.1 °F (36.7 °C) (03/02/23 0404)  Pulse: 81 (03/02/23 0656)  Resp: 17 (03/02/23 0404)  BP: (!) 147/70 (03/02/23 0404)  SpO2: (!) 90 % (03/02/23 0404)   Vital Signs (24h Range):  Temp:  [97.7 °F (36.5 °C)-98.2 °F (36.8 °C)] 98.1 °F (36.7 °C)  Pulse:  [68-82] 81  Resp:  [16-22] 17  SpO2:  [90 %-100 %] 90 %  BP:  (124-152)/(59-72) 147/70     Weight: 108.4 kg (238 lb 15.7 oz)  Body mass index is 31.53 kg/m².    Intake/Output Summary (Last 24 hours) at 3/2/2023 0746  Last data filed at 3/2/2023 0529  Gross per 24 hour   Intake --   Output 5400 ml   Net -5400 ml        Physical Exam  Constitutional:       Appearance: He is well-developed.   HENT:      Head: Normocephalic and atraumatic.      Mouth/Throat:      Mouth: Mucous membranes are moist.      Comments: hoarseness  Eyes:      General: No scleral icterus.     Extraocular Movements: Extraocular movements intact.      Pupils: Pupils are equal, round, and reactive to light.   Neck:      Vascular: No JVD.   Cardiovascular:      Rate and Rhythm: Normal rate and regular rhythm.      Heart sounds: No murmur heard.    No friction rub. No gallop.   Pulmonary:      Effort: Pulmonary effort is normal. No respiratory distress.      Breath sounds: Examination of the right-lower field reveals decreased breath sounds. Examination of the left-lower field reveals decreased breath sounds. Decreased breath sounds, wheezing and rhonchi present. No rales.   Abdominal:      General: Bowel sounds are normal. There is no distension.      Palpations: Abdomen is soft.      Tenderness: There is no abdominal tenderness.   Musculoskeletal:         General: Tenderness (right ankle) present. No deformity. Normal range of motion.      Cervical back: Neck supple.   Lymphadenopathy:      Cervical: No cervical adenopathy.   Skin:     General: Skin is warm and dry.      Capillary Refill: Capillary refill takes less than 2 seconds.      Findings: No erythema or rash.   Neurological:      General: No focal deficit present.      Mental Status: He is alert and oriented to person, place, and time.      Cranial Nerves: No cranial nerve deficit.      Sensory: No sensory deficit.      Motor: Weakness present.   Psychiatric:         Mood and Affect: Mood normal.       Significant Labs: A1C:   Recent Labs   Lab  09/08/22  0909 01/04/23  0819   HGBA1C 5.4 5.5       CBC:   Recent Labs   Lab 03/01/23 0224 03/02/23  0453   WBC 7.38 6.89   HGB 7.9* 8.6*   HCT 25.7* 28.5*    282       CMP:   Recent Labs   Lab 03/01/23 0224 03/02/23  0453    141   K 3.7 3.3*    97   CO2 28 33*   GLU 83 63*   BUN 51* 45*   CREATININE 3.9* 3.9*   CALCIUM 8.1* 8.7   ANIONGAP 12 11       Coagulation: No results for input(s): PT, INR, APTT in the last 48 hours.  Lactic Acid:   No results for input(s): LACTATE in the last 48 hours.        Significant Imaging: I have reviewed all pertinent imaging results/findings within the past 24 hours.

## 2023-03-02 NOTE — ASSESSMENT & PLAN NOTE
Acute hypoxic respiratory failure  -Unchanged prior to admission; Don't suspect acute covid  - seems resolved; on RA now  -CT chest w/o contrast showing BL pleural effusions; unable to perform thoracentesis due to insufficient fluid for safe procedure   -improved w/ aggressive diuresis

## 2023-03-02 NOTE — PROGRESS NOTES
[facility] - Intensive Care ([address] Medicine  Progress Note    Patient Name: Ibrahima Phelps  MRN: [id number]  Patient Class: IP- Inpatient   Admission Date: 2/20/2023  Length of Stay: 10 days  Attending Physician: Russ Vizcaino MD  Primary Care Provider: Connie Magdaleno MD        Subjective:     Principal Problem:Acute kidney injury superimposed on CKD        HPI:  67-year-old black male transplant kidney patient being admitted for KATINA.  Patient was just discharged from a hospitalization for COVID pneumonia on 02/10/2023.  He also paid in ED visit a few days ago for what could have been a mild cellulitis/folliculitis on his foot for which he is almost done with his Keflex regimen.    Patient was instructed to come to the ED this time by the COVID surveillance nurses due to desaturation down to 88%.  When I talked with the patient and his wife in detail, they affirmed that he was not having any new or worsening shortness of breath since his discharge.  His wife did report that once the patient woke up/sat up his desaturation spontaneously resolved.  Patient denies any steven current shortness of breath or chest pain.  No nausea vomiting or flu fever like feeling.  In my discussion with the ED provider who was noted that the patient was complaining of shortness of breath but there was a concern that this likely was anxiety triggered.  Patient seems to be saturating well on room air here in the ED and chest x-ray which I personally reviewed is clear bilaterally.  Wife reports patient has been compliant with his medications.    The blood work did demonstrate an acutely elevated creatinine of 3.8 whereas his baseline is in the 2s.      Overview/Hospital Course:  67-year-old renal transplant black male was admitted for acute on chronic kidney injury.  Patient was just discharged from hospitalization for COVID pneumonia on 02/10/2023.  Presented to the ED per recommendations of the COVID surveillance team.   Did not have any definitive acute respiratory findings to indicate acute ongoing infectious COVID, chest x-ray was clear.  However creatinine was significantly elevated up to 3.8 whereas baseline was in the 2s.  Was started on IV fluids with trivial improvement.  CT chest was obtained due to his persistent unchanged severe dyspnea which showed some small bilateral effusions, not enough to drain via thoracentesis.  Patient was started on diuresis with improvement in shortness of breath.  Creatinine seemed to plateau around 3.9.  Patient did get delirious and was started on Depakote.      Interval History:  No acute issues noted overnight, afebrile.  Wife thinks patient is much better since the time of admission.  Patient denies any steven shortness of breath that is getting worse and that is new today.  No chest pain.  Creatinine holding steady around 3.94.0, maintaining good urinary output..  Successfully voiding after Hamilton removed per nursing.      Review of Systems  Objective:     Vital Signs (Most Recent):  Temp: 99.1 °F (37.3 °C) (03/02/23 1523)  Pulse: 81 (03/02/23 1546)  Resp: 18 (03/02/23 1546)  BP: (!) 164/74 (03/02/23 1523)  SpO2: 96 % (03/02/23 1546)   Vital Signs (24h Range):  Temp:  [97.7 °F (36.5 °C)-99.1 °F (37.3 °C)] 99.1 °F (37.3 °C)  Pulse:  [70-86] 81  Resp:  [17-22] 18  SpO2:  [90 %-98 %] 96 %  BP: (124-164)/(59-78) 164/74     Weight: 110.5 kg (243 lb 9.7 oz)  Body mass index is 32.14 kg/m².    Intake/Output Summary (Last 24 hours) at 3/2/2023 1602  Last data filed at 3/2/2023 1023  Gross per 24 hour   Intake --   Output 4300 ml   Net -4300 ml      Physical Exam  Clear lungs bilaterally, unlabored breathing, on room air, no cyanosis   Heart sounds indicate a regular rate and rhythm   No obvious lower extremity edema   Sleeping, easily woken there appears to be drowsy, no acute distress  No facial droop, no slurred speech   Follows motor commands   Moves all 4 extremities         Assessment/Plan:       67-year-old white male admitted for acute kidney injury superimposed on CKD on transplanted kidney    * Acute kidney injury superimposed on CKD  Baseline seems to be a creatinine in the 2s   Cr still elevated unchanged at 3.8 despite IVF. Scr increased to 4.0 --> 3.8  US no overly revealing; Transplant working up rejection  - maintain urinary output but creatinine seems to be plateauing now at 3.8-4.0.  Patient affirms he does not want dialysis.    -switching from Lasix drip to bolus IV Lasix push per Nephrology          H/O kidney transplant  K transplant service following  Routine prograf levels  Continue home Prograf 2 mg b.i.d.  Continue home prednisone  - can restart on  BID at the time of discharge      Chronic dyspnea  Acute hypoxic respiratory failure  -Unchanged prior to admission; Don't suspect acute covid  - seems resolved; on RA now  -CT chest w/o contrast showing BL pleural effusions; unable to perform thoracentesis due to insufficient fluid for safe procedure   -improved w/ aggressive diuresis      Goals of care, counseling/discussion  Advance Care Planning     [facility]  I engaged the patient and family in a conversation about advance care planning and we specifically addressed what the goals of care would be moving forward, in light of the patient's change in clinical status, specifically acute renal failure in the setting of suspected chronic rejection.  We did specifically address the patient's likely prognosis, which is fair .  We explored the patient's values and preferences for future care.  The patient and family endorses that what is most important right now is to focus on spending time at home, remaining as independent as possible, symptom/pain control and quality of life, even if it means sacrificing a little time    Accordingly, we have decided that the best plan to meet the patient's goals includes continuing with treatment    I did explain the role for hospice care at this stage of  "the patient's illness, including its ability to help the patient live with the best quality of life possible.  We will not be making a hospice referral at this time. Will continue GOC discussions.    I spent a total of 45 minutes engaging the patient in this advance care planning discussion.           Advance care planning  - patient expressed that he would not like to pursue dialysis if renal failure progressed   - Palliative care consulted. apprec recs      Palliative care encounter  - see "goals of care discussion and advance care planning"      Delirium  - too drowsy on depakote; stopping it; zyprexa as needed and scheduled      Right ankle pain  - suspect may be 2/2 gout flare  - h/o gout   - followup Xray right ankle negative   - uric acid elevated at 14.5  - colchicine 0.3 mg daily due to KATINA  - continue to monitor   - IMPROVING      Acute on chronic diastolic heart failure  - Initially held home dosing of p.r.n. Lasix given KATINA and did trial of IVF but no improvement in Scr  - nephrology following   - started trial of IV lasix on 2/23 with improvement in Scr. Started on lasix gtt on 2/27. Added IV diuril BID on 2/28 per nephro recs  - switching over from lasix ggt to lasix IV bid    Paroxysmal atrial fibrillation  Continue home Eliquis, metoprolol  - EP cardiology consulted, apprec recs  -- holding home  Flecainide due to KATINA        CAD (coronary artery disease)  History of stenting years ago   Continue home aspirin and statin, Toprol      Hypertension  Continue home metoprolol, nifedipine, hydralazine      Long-term use of immunosuppressant medication  - see "kidney transplant"        Discussed with wife who affirmed that the patient was carrying his own weight and she feels comfortable taking him home as opposed to sniff placement.    VTE Risk Mitigation (From admission, onward)         Ordered     apixaban tablet 5 mg  2 times daily         02/28/23 1904     Place sequential compression device  Until " discontinued         02/27/23 1212                Discharge Planning   RAMU: 3/2/2023     Code Status: Full Code   Is the patient medically ready for discharge?: No    Reason for patient still in hospital (select all that apply): Pending disposition  Discharge Plan A: Home with family                  Russ Vizcaino MD  Department of Hospital Medicine   [facility] - [facility] ([address]

## 2023-03-02 NOTE — PT/OT/SLP PROGRESS
"Physical Therapy Treatment    Patient Name:  Ibrahima Phelps   MRN:  [id number]    Recommendations:     Discharge Recommendations: nursing facility, skilled  Discharge Equipment Recommendations: bedside commode, walker, rolling, shower chair  Barriers to discharge: Inaccessible home and Decreased caregiver support    Assessment:     Ibrahima Phelps is a 67 y.o. male admitted with a medical diagnosis of Acute kidney injury superimposed on CKD.  He presents with the following impairments/functional limitations: weakness, impaired balance, impaired endurance, impaired self care skills, impaired functional mobility, gait instability, impaired cardiopulmonary response to activity, decreased lower extremity function, decreased upper extremity function, decreased safety awareness, pain. Pt appeared lethargic and drowsy throughout therapy. Pt required pant perineal care during TE. Pt responded to simple commands <50%. Pt required verbal and tactile cues to perform therapeutic exercises in bed. Pt tolerated sitting @ EOB however closed eyes and loses sitting balance. Pt will cont to benefit from skilled acute PT to improve mobility and strength.    Rehab Prognosis: Fair; patient would benefit from acute skilled PT services to address these deficits and reach maximum level of function.    Recent Surgery: * No surgery found *      Plan:     During this hospitalization, patient to be seen 3 x/week to address the identified rehab impairments via gait training, therapeutic activities, therapeutic exercises, neuromuscular re-education and progress toward the following goals:    Plan of Care Expires:  03/20/23    Subjective     Chief Complaint: None  Patient/Family Comments/goals: Pt wife stated "whenever he gets those medication this is how he sleeps all day".  Pain/Comfort:  Pain Rating 1:  (not rated)  Location - Side 1: Bilateral  Location - Orientation 1: generalized  Location 1: penis  Pain Addressed 1: Cessation of Activity, " Reposition      Objective:     Communicated with nurse prior to session.  Patient found HOB elevated with telemetry, pulse ox (continuous), peripheral IV, pearson catheter upon PT entry to room.     General Precautions: Standard, fall  Orthopedic Precautions: N/A  Braces: N/A  Respiratory Status: Room air     Functional Mobility:  Bed Mobility:     Rolling Left:  stand by assistance and minimum assistance  Rolling Right: minimum assistance  Scooting: minimum assistance  Supine to Sit: minimum assistance  Sit to Supine: contact guard assistance  Transfers: unsafe due to symptoms of drowsiness and lethargic  Balance: Fair sitting balance @ EOB @ CGA      AM-PAC 6 CLICK MOBILITY  Turning over in bed (including adjusting bedclothes, sheets and blankets)?: 3  Sitting down on and standing up from a chair with arms (e.g., wheelchair, bedside commode, etc.): 3  Moving from lying on back to sitting on the side of the bed?: 3  Moving to and from a bed to a chair (including a wheelchair)?: 3  Need to walk in hospital room?: 3  Climbing 3-5 steps with a railing?: 1  Basic Mobility Total Score: 16       Treatment & Education:  Therapeutic exercises: hip abd/add, hip flexion, AP's x 10 reps AAROM  Pt's wife was educated on pt's progress towards PT goals.    Patient left right sidelying with all lines intact, call button in reach, bed alarm on, nurse notified, and wife present..    GOALS:   Multidisciplinary Problems       Physical Therapy Goals          Problem: Physical Therapy    Goal Priority Disciplines Outcome Goal Variances Interventions   Physical Therapy Goal     PT, PT/OT Ongoing, Progressing     Description: Goals to be met by: 3/20/2023      Patient will increase functional independence with mobility by performin. Supine to sit with Supervision  2. Sit to supine with Supervision  3. Sit to stand transfer with Stand-by Assistance  4. Bed to chair transfer with Stand-by Assistance using Rolling Walker  5. Gait  x  100 feet with Stand-by Assistance using Rolling Walker.   6. Ascend/Descend 6 inch curb step with Contact Guard Assistance using Rolling Walker.                         Time Tracking:     PT Received On: 03/02/23  PT Start Time: 1117     PT Stop Time: 1140  PT Total Time (min): 23 min     Billable Minutes: Therapeutic Activity 13 and Therapeutic Exercise 10    Treatment Type: Treatment  PT/PTA: PTA     PTA Visit Number: 2     03/02/2023

## 2023-03-02 NOTE — PROGRESS NOTES
[facility] - [facility] ([address] Medicine  Progress Note    Patient Name: Ibrahima Phelps  MRN: [id number]  Patient Class: IP- Inpatient   Admission Date: 2/20/2023  Length of Stay: 10 days  Attending Physician: Russ Vizcaino MD  Primary Care Provider: Connie Magdaleno MD        Subjective:     Principal Problem:Acute kidney injury superimposed on CKD        HPI:  67-year-old black male transplant kidney patient being admitted for KATINA.  Patient was just discharged from a hospitalization for COVID pneumonia on 02/10/2023.  He also paid in ED visit a few days ago for what could have been a mild cellulitis/folliculitis on his foot for which he is almost done with his Keflex regimen.    Patient was instructed to come to the ED this time by the COVID surveillance nurses due to desaturation down to 88%.  When I talked with the patient and his wife in detail, they affirmed that he was not having any new or worsening shortness of breath since his discharge.  His wife did report that once the patient woke up/sat up his desaturation spontaneously resolved.  Patient denies any steven current shortness of breath or chest pain.  No nausea vomiting or flu fever like feeling.  In my discussion with the ED provider who was noted that the patient was complaining of shortness of breath but there was a concern that this likely was anxiety triggered.  Patient seems to be saturating well on room air here in the ED and chest x-ray which I personally reviewed is clear bilaterally.  Wife reports patient has been compliant with his medications.    The blood work did demonstrate an acutely elevated creatinine of 3.8 whereas his baseline is in the 2s.      Overview/Hospital Course:  67-year-old renal transplant black male was admitted for acute on chronic kidney injury.  Patient was just discharged from hospitalization for COVID pneumonia on 02/10/2023.  Presented to the ED per recommendations of the COVID surveillance team.   Did not have any definitive acute respiratory findings to indicate acute ongoing infectious COVID, chest x-ray was clear.  However creatinine was significantly elevated up to 3.8 whereas baseline was in the 2s.  Was started on IV fluids with trivial improvement.      Interval History: Patient lying in bed, no acute distress. Wife at bedside. Overnight, patient's wife noted that he was more anxious and restless. Resumed prior depakote dose to assist with suspected delirium. Increased UOP with IV lasix gtt. Scr increased slightly from 3.7 to 3.9. Diuril discontinued and lasix gtt reduced to 10 mg/hr. Patient weaned to room air. Right ankle pain improving from gout flare. KTM and Palliative care following.     Intake/Output Summary (Last 24 hours) at 3/2/2023 0757  Last data filed at 3/2/2023 0583  Gross per 24 hour   Intake --   Output 5400 ml   Net -5400 ml           Review of Systems   Constitutional:  Positive for activity change and fatigue. Negative for chills and fever.   HENT:  Negative for congestion and trouble swallowing.    Eyes:  Negative for visual disturbance.   Respiratory:  Positive for cough and shortness of breath.    Cardiovascular:  Negative for chest pain and leg swelling.   Gastrointestinal:  Negative for abdominal distention, abdominal pain, nausea and vomiting.   Endocrine: Negative for cold intolerance, heat intolerance, polydipsia and polyuria.   Genitourinary:  Negative for difficulty urinating and dysuria.        Urinary incontinence   Musculoskeletal:  Positive for arthralgias (right ankle). Negative for back pain and myalgias.   Skin:  Negative for rash and wound.   Neurological:  Positive for weakness. Negative for dizziness and light-headedness.   Hematological:  Negative for adenopathy. Does not bruise/bleed easily.   Psychiatric/Behavioral:  Negative for confusion and sleep disturbance.      Objective:     Vital Signs (Most Recent):  Temp: 98.1 °F (36.7 °C) (03/02/23 0404)  Pulse: 81  (03/02/23 0656)  Resp: 17 (03/02/23 0404)  BP: (!) 147/70 (03/02/23 0404)  SpO2: (!) 90 % (03/02/23 0404)   Vital Signs (24h Range):  Temp:  [97.7 °F (36.5 °C)-98.2 °F (36.8 °C)] 98.1 °F (36.7 °C)  Pulse:  [68-82] 81  Resp:  [16-22] 17  SpO2:  [90 %-100 %] 90 %  BP: (124-152)/(59-72) 147/70     Weight: 108.4 kg (238 lb 15.7 oz)  Body mass index is 31.53 kg/m².    Intake/Output Summary (Last 24 hours) at 3/2/2023 0746  Last data filed at 3/2/2023 0529  Gross per 24 hour   Intake --   Output 5400 ml   Net -5400 ml        Physical Exam  Constitutional:       Appearance: He is well-developed.   HENT:      Head: Normocephalic and atraumatic.      Mouth/Throat:      Mouth: Mucous membranes are moist.      Comments: hoarseness  Eyes:      General: No scleral icterus.     Extraocular Movements: Extraocular movements intact.      Pupils: Pupils are equal, round, and reactive to light.   Neck:      Vascular: No JVD.   Cardiovascular:      Rate and Rhythm: Normal rate and regular rhythm.      Heart sounds: No murmur heard.    No friction rub. No gallop.   Pulmonary:      Effort: Pulmonary effort is normal. No respiratory distress.      Breath sounds: Examination of the right-lower field reveals decreased breath sounds. Examination of the left-lower field reveals decreased breath sounds. Decreased breath sounds, wheezing and rhonchi present. No rales.   Abdominal:      General: Bowel sounds are normal. There is no distension.      Palpations: Abdomen is soft.      Tenderness: There is no abdominal tenderness.   Musculoskeletal:         General: Tenderness (right ankle) present. No deformity. Normal range of motion.      Cervical back: Neck supple.   Lymphadenopathy:      Cervical: No cervical adenopathy.   Skin:     General: Skin is warm and dry.      Capillary Refill: Capillary refill takes less than 2 seconds.      Findings: No erythema or rash.   Neurological:      General: No focal deficit present.      Mental Status: He is  alert and oriented to person, place, and time.      Cranial Nerves: No cranial nerve deficit.      Sensory: No sensory deficit.      Motor: Weakness present.   Psychiatric:         Mood and Affect: Mood normal.       Significant Labs: A1C:   Recent Labs   Lab 09/08/22  0909 01/04/23  0819   HGBA1C 5.4 5.5       CBC:   Recent Labs   Lab 03/01/23 0224 03/02/23  0453   WBC 7.38 6.89   HGB 7.9* 8.6*   HCT 25.7* 28.5*    282       CMP:   Recent Labs   Lab 03/01/23 0224 03/02/23 0453    141   K 3.7 3.3*    97   CO2 28 33*   GLU 83 63*   BUN 51* 45*   CREATININE 3.9* 3.9*   CALCIUM 8.1* 8.7   ANIONGAP 12 11       Coagulation: No results for input(s): PT, INR, APTT in the last 48 hours.  Lactic Acid:   No results for input(s): LACTATE in the last 48 hours.        Significant Imaging: I have reviewed all pertinent imaging results/findings within the past 24 hours.      Assessment/Plan:      * Acute kidney injury superimposed on CKD  Baseline seems to be a creatinine in the 2s   Cr still elevated unchanged at 3.8 despite IVF. Scr increased to 4.0 --> 3.8  US no overly revealing; Transplant working up rejection  - nephrology consulted. apprec recs  -- followup DSA  -- off IVF. Ordered IV lasix 40 mg BID x 2 doses on 2/23. Increased to lasix gtt on 2/27  -- BMP daily   - palliative care consulted. followup recs  - patient expressed desire to not proceed with dialysis if it is indicated in the future. Will need GOC discussion and advance care planning in the event that aggressive diuresis does not improve his renal function          Goals of care, counseling/discussion  Advance Care Planning     GOC  I engaged the patient and family in a conversation about advance care planning and we specifically addressed what the goals of care would be moving forward, in light of the patient's change in clinical status, specifically acute renal failure in the setting of suspected chronic rejection.  We did specifically  "address the patient's likely prognosis, which is fair .  We explored the patient's values and preferences for future care.  The patient and family endorses that what is most important right now is to focus on spending time at home, remaining as independent as possible, symptom/pain control and quality of life, even if it means sacrificing a little time    Accordingly, we have decided that the best plan to meet the patient's goals includes continuing with treatment    I did explain the role for hospice care at this stage of the patient's illness, including its ability to help the patient live with the best quality of life possible.  We will not be making a hospice referral at this time. Will continue GOC discussions.    I spent a total of 45 minutes engaging the patient in this advance care planning discussion.           Advance care planning  - patient expressed that he would not like to pursue dialysis if renal failure progressed   - Palliative care consulted. apprec recs      Palliative care encounter  - see "goals of care discussion and advance care planning"      Delirium  - started depakote. Weaned down to depakote 250 mg nightly   - delirium precautions      Acute hypoxemic respiratory failure  Patient with Hypoxic Respiratory failure which is Acute.  he is not on home oxygen. Supplemental oxygen was provided and noted-  .   Signs/symptoms of respiratory failure include- tachypnea, increased work of breathing, respiratory distress, wheezing and lethargy. Contributing diagnoses includes - CHF, Obesity Hypoventilation and Pleural effusion Labs and images were reviewed. Patient Has recent ABG, which has been reviewed. Will treat underlying causes and adjust management of respiratory failure.    PLAN:  - h/o COVID 2/7/2023  - continue scheduled and prn duonebs  - weaned to room air  - continue supportive care: chest PT, acapella, IS, OOB  - continue CHF and KATINA management   - continue IV diuresis   - see "chronic " "dyspnea"  - IMPROVING    Right ankle pain  - suspect may be 2/2 gout flare  - h/o gout   - followup Xray right ankle negative   - uric acid elevated at 14.5  - colchicine 0.3 mg daily due to KATINA  - continue to monitor   - IMPROVING      Hypoxia  - continue IV diuresis   - weaning supplemental O2 as tolerated  - see "acute hypoxic respiratory failure"      Anemia of renal disease  - see anemia of chronic kidney disease      Wheezing  - albuterol inhaler prn  - see "acute hypoxic respiratory failure"      Chronic dyspnea  Acute hypoxic respiratory failure  -Unchanged prior to admission; Don't suspect acute covid  -CT chest w/o contrast showing BL pleural effusions; unable to perform thoracentesis due to insufficient fluid for safe procedure   - required BiPAP on 2/27 for pO2 of 44. Increased to lasix gtt  -tx underlying anemia - see below  - continue supportive care: albuterol prn, incentive spirometry, anti-tussive meds, chest PT, acapella, OOB to chair  -covid + on 2/7/23  - IMPROVING      Cellulitis of foot  - completed 7 days of Keflex (end date: 2/27)    Hypocalcemia  - replete prn      Debility  - PT/OT recommend SNF      Anemia due to stage 4 chronic kidney disease  - required transfusion on 2/22 and 2/27  - CT A/P negative for intra-abdominal bleeding  - holding home Eliquis for now. Resumed AC on 2/28 since no evidence of bleeding  - followup stool occult  - CBC daily       Acute on chronic diastolic heart failure  - Initially held home dosing of p.r.n. Lasix given KATINA and did trial of IVF but no improvement in Scr  - nephrology following   - started trial of IV lasix on 2/23 with improvement in Scr. Started on lasix gtt on 2/27. Added IV diuril BID on 2/28 per nephro recs  - continue home metoprolol  - followup BMP    Paroxysmal atrial fibrillation  Continue home Eliquis, metoprolol  - EP cardiology consulted, apprec recs  -- holding home  Flecainide due to KATINA        H/O kidney transplant  K transplant " "service following  Routine prograf levels  Continue home Prograf 2 mg b.i.d.  Continue home prednisone  - can restart on  BID at the time of discharge      CAD (coronary artery disease)  History of stenting years ago   Continue home aspirin and statin, Toprol      Hypertension  Continue home metoprolol, nifedipine, hydralazine      Long-term use of immunosuppressant medication  - see "kidney transplant"        VTE Risk Mitigation (From admission, onward)         Ordered     apixaban tablet 5 mg  2 times daily         02/28/23 1904     Place sequential compression device  Until discontinued         02/27/23 1212                Discharge Planning   RAMU: 3/6/2023     Code Status: Full Code   Is the patient medically ready for discharge?: No    Reason for patient still in hospital (select all that apply): Patient unstable, Patient trending condition, Laboratory test, Treatment and Consult recommendations  Discharge Plan A: Home with family            Time spent in care of patient: > 35 minutes         Morris Payan MD  Department of Hospital Medicine   [facility] - Intensive Care ([place] [id number])    "

## 2023-03-02 NOTE — PLAN OF CARE
CHW scheduled pcp f/u appt   Future Appointments   Date Time Provider Department Center   3/9/2023  8:30 AM HAO Thomas II, MD [facility] PCW   3/13/2023  2:30 PM PRE-ADMIT, ENDO -[facility] ENDOPP4 [facility]   6/20/2023  9:40 AM Marcos Brunson MD [facility] PAM EPI [facility]

## 2023-03-02 NOTE — ASSESSMENT & PLAN NOTE
- outpatient regimen consists of tacrolimus 2.5 mg BID and prednisone 5 mg daily  - will continue with tacrolimus 2 mg BID and prednisone 5 mg daily  - goal Prograf trough (4-6), near goal today with value of 6.3 (~12 hour trough)  - daily tacrolimus troughs  - continue to monitor for toxicity

## 2023-03-02 NOTE — ASSESSMENT & PLAN NOTE
Impression: Pt is a 66 y/o male with renal transplant who was admitted for acute on chronic kidney injury. Pt had kidney transplant times 2- first one in 1992 and then again in 2005. Pt is alert, oriented to person, place, and situation for most part. Pt has sitter cam at bedside. Pt is a full code.     Reason for consult: GOC/ACP. Pt has KATINA on CKD. Pt does not want dialysis if needed in future.     Goals of care/ACP:    Today: Pt sleeping. Sitter cam at bedside. Pal care will continue to follow along.      3/1/23  Met with pt and pt's wife. Pt and wife aware pt having acute kidney injury on CKD. Per pt, he would never want to go back on dialysis if needed in future. Pt's wife is aware. Pt concerned his wishes may not be honored.  Explained to pt and wife, that is pt's goal is not to have dialysis, his wishes would be honored. Discussed with pt and wife, in future if pt needs dialysis and pt does not want it, focus would change to comfort/QOL. Hospice care discussed.     At this time, plan is to continue medical management. Pal care will follow along   MPOA: Pt's wife is NOK.     Symptom management:     Debility:   PT/Ot seeing pt.   SNF recommended.     Delirium  Pt is alert and oriented to person, place, and situation for most part.   - started depakote. Weaned down to depakote 250 mg nightly   - delirium precautions    Pt appears comfortable. No signs of distress noted.     Plan:  Pal care will continue to follow for ACP.

## 2023-03-02 NOTE — SUBJECTIVE & OBJECTIVE
"Interval History:  Pt renal transplant x 2.     Past Medical History:   Diagnosis Date    (HFpEF) heart failure with preserved ejection fraction 9/25/2017    Atrial fibrillation     CAD (coronary artery disease)     CHF (congestive heart failure)     Chronic diastolic heart failure 4/8/2020    CKD (chronic kidney disease) stage 3, GFR 30-59 ml/min     Dr. Latif    CKD (chronic kidney disease) stage 3, GFR 30-59 ml/min     Diverticulosis     Fever blister     Heart attack 2006    Hyperlipidemia     Hypertension     Immunodeficiency due to treatment with immunosuppressive medication     Keloid cicatrix     Left lower quadrant abdominal pain 6/5/2022    Metabolic bone disease     Pericarditis     S/P kidney transplant     Supraventricular tachycardia 06/2019    Thrombocytopenia     Thyroid disease     Tobacco use 9/5/2014    Unspecified disorder of kidney and ureter     Stage IIICKD       Past Surgical History:   Procedure Laterality Date    CARDIOVERSION  04/30/15    CHOLECYSTECTOMY      COLONOSCOPY  April 20, 2011    Diverticulosis, repeat recommended in 3 yrs., repeat colonoscopy 2014 revealed 2 polyps.  He should return in 5 years.    COLONOSCOPY N/A 3/13/2020    Procedure: COLONOSCOPY;  Surgeon: Chon Casper MD;  Location: [facility] (4TH FLR);  Service: Endoscopy;  Laterality: N/A;  ok to hold coumadin x5days-see telephone encounter 2/4/20-tb    COLONOSCOPY N/A 2/4/2021    Procedure: COLONOSCOPY;  Surgeon: Chon Casper MD;  Location: [facility] (4TH FLR);  Service: Endoscopy;  Laterality: N/A;  Eliquis - per Dr. GAVIN Blunt ok to hold x 2 days and "restarted asap"- ERW  last prep "poor", [id number] ordered/ Pt requests Dr. Casper  prep ins. mailed - COVID screening on 2/1/21 PCW- ERW    COLONOSCOPY N/A 3/3/2021    Procedure: COLONOSCOPY;  Surgeon: Chon Casper MD;  Location: [facility] (4TH FLR);  Service: Endoscopy;  Laterality: N/A;  Patient with his second poor bowel pre and has poor prep today.  If " patient not intersted or can't get colonoscopy tomorrow will need constipation bowel prep and will need to restart his Eliquis today.Thanks,Chon     per Dr Blunt-ok to hold Eliquis 2 days prior      COVID     CORONARY ANGIOPLASTY WITH STENT PLACEMENT  9/13/2006    KIDNEY TRANSPLANT  2005    PARATHYROIDECTOMY      TREATMENT OF CARDIAC ARRHYTHMIA N/A 3/28/2022    Procedure: CARDIOVERSION;  Surgeon: Migue Blunt MD;  Location: [facility];  Service: Cardiology;  Laterality: N/A;  AF, DCC, MAC, GP, 3 PREP*RODRIGO deferred pt 100% compliant*       Review of patient's allergies indicates:   Allergen Reactions    Ace inhibitors Swelling    Verapamil Other (See Comments)     Other reaction(s): Unknown    Povidone-iodine Itching       Medications:  Continuous Infusions:   furosemide (LASIX) 10 mg/mL infusion (non-titrating) 10 mg/hr (03/01/23 1326)     Scheduled Meds:   acetaminophen  1,000 mg Oral TID    albuterol-ipratropium  3 mL Nebulization Q4H WAKE    apixaban  5 mg Oral BID    aspirin  81 mg Oral Daily    atorvastatin  40 mg Oral QHS    calcium acetate(phosphat bind)  1,334 mg Oral TID WM    divalproex  500 mg Oral Daily PM    And    divalproex  250 mg Oral QAM    doxazosin  4 mg Oral Q12H    epoetin samara (PROCRIT) injection  20,000 Units Subcutaneous Q7 Days    famotidine  20 mg Oral Daily    fluticasone furoate-vilanteroL  1 puff Inhalation Daily    gabapentin  100 mg Oral QHS    hydrALAZINE  50 mg Oral TID    metoprolol succinate  25 mg Oral Daily    NIFEdipine  60 mg Oral BID    paricalcitoL  1 mcg Oral Daily    predniSONE  5 mg Oral Daily    tacrolimus  2 mg Oral BID     PRN Meds:sodium chloride, sodium chloride, albuterol, albuterol-ipratropium, clonazePAM, melatonin, traMADoL    Family History       Problem Relation (Age of Onset)    Heart disease Father    Kidney disease Sister, Brother    Kidney failure Sister, Brother    No Known Problems Sister, Brother, Sister, Sister    Thyroid disease Mother           Tobacco Use    Smoking status: Former     Packs/day: 0.50     Years: 40.00     Pack years: 20.00     Types: Cigarettes     Quit date: 2022     Years since quittin.0    Smokeless tobacco: Never    Tobacco comments:     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     Patient is currently retired   Substance and Sexual Activity    Alcohol use: No    Drug use: No    Sexual activity: Yes     Partners: Female       Review of Systems   Constitutional:  Positive for activity change and fatigue.   Respiratory:  Positive for shortness of breath.    Gastrointestinal:  Negative for abdominal distention.   Neurological:  Positive for weakness.   Psychiatric/Behavioral:  Positive for decreased concentration.    Objective:     Vital Signs (Most Recent):  Temp: 98.2 °F (36.8 °C) (23 0753)  Pulse: 81 (23 1032)  Resp: 18 (23 0754)  BP: (!) 143/64 (23 0753)  SpO2: 97 % (23 0754)   Vital Signs (24h Range):  Temp:  [97.7 °F (36.5 °C)-98.2 °F (36.8 °C)] 98.2 °F (36.8 °C)  Pulse:  [68-86] 81  Resp:  [16-22] 18  SpO2:  [90 %-99 %] 97 %  BP: (124-152)/(59-72) 143/64     Weight: 110.5 kg (243 lb 9.7 oz)  Body mass index is 32.14 kg/m².    Physical Exam  Constitutional:       General: He is not in acute distress.     Appearance: He is well-developed.      Comments: Sitter cam at bedside   HENT:      Head: Normocephalic and atraumatic.   Eyes:      General: Lids are normal.      Conjunctiva/sclera: Conjunctivae normal.   Cardiovascular:      Rate and Rhythm: Normal rate and regular rhythm.   Pulmonary:      Effort: Pulmonary effort is normal. No respiratory distress.   Abdominal:      Tenderness: There is no abdominal tenderness.   Musculoskeletal:      Cervical back: Full passive range of motion without pain and normal range of motion.      Comments: Normal ROM   Skin:     General: Skin is warm and dry.   Neurological:      Mental Status: He is alert and oriented to  person, place, and time.   Psychiatric:         Attention and Perception: He is inattentive.       Review of Symptoms      Symptom Assessment (ESAS 0-10 Scale)  Pain:  0  Dyspnea:  0  Anxiety:  0  Nausea:  0  Depression:  0  Anorexia:  0  Fatigue:  0  Insomnia:  0  Restlessness:  0  Agitation:  0         Performance Status:  60    Living Arrangements:  Lives with family    Psychosocial/Cultural:   See Palliative Psychosocial Note: No  Pt  to spouse.  Pt had renal transplant x 2. Pt wife has been caregiver.   **Primary  to Follow**  Palliative Care  Consult: No      Advance Care Planning   Advance Directives:   Living Will: No    Do Not Resuscitate Status: No    Medical Power of : No    Agent's Name:  Pt's wife is NOK    Decision Making:  Patient answered questions and Family answered questions  Goals of Care: What is most important right now is to focus on spending time at home, remaining as independent as possible, symptom/pain control, quality of life, even if it means sacrificing a little time. Accordingly, we have decided that the best plan to meet the patient's goals includes continuing with treatment.       Significant Labs: All pertinent labs within the past 24 hours have been reviewed.  CBC:   Recent Labs   Lab 03/02/23 0453   WBC 6.89   HGB 8.6*   HCT 28.5*   MCV 81*          BMP:  Recent Labs   Lab 03/02/23 0453   GLU 63*      K 3.3*   CL 97   CO2 33*   BUN 45*   CREATININE 3.9*   CALCIUM 8.7       LFT:  Lab Results   Component Value Date    AST 25 02/27/2023    ALKPHOS 78 02/27/2023    BILITOT 0.4 02/27/2023     Albumin:   Albumin   Date Value Ref Range Status   02/27/2023 2.2 (L) 3.5 - 5.2 g/dL Final     Protein:   Total Protein   Date Value Ref Range Status   02/27/2023 7.3 6.0 - 8.4 g/dL Final     Lactic acid:   Lab Results   Component Value Date    LACTATE 1.5 02/22/2023    LACTATE 1.9 02/20/2023       Significant Imaging: I have reviewed all  pertinent imaging results/findings within the past 24 hours.

## 2023-03-02 NOTE — SUBJECTIVE & OBJECTIVE
Subjective:   History of Present Illness:  67 year old man with past medical history of kidney transplant times 2 first one in 1992 and then again in 2005. Has CKD with baseline creatinine high 2s. Recently admitted earlier this month with COVID. Was admitted with KATINA with cr 3.8 on admit. Last admission when he was admitted with COVID his cr did peak at 3.8 but when he was on 2/10 his cr was 2.6 at baseline. Patient denies any diarrhea after discharge from hospital. Says that he has a hard time urinating but no dysuria, no hematuria , no flank pain , no fevers. Was on keflex for mild cellulitis. Denies NSAID use. This morning patient was wheezing and had some SOB. He was started on IVF NS with no improvement in scr this morning was 3.8    Mr. Phelps is a 67 y.o. year old male who is status post Kidney Transplant - 4/12/2005 - Not Followed  (#2).    His maintenance immunosuppression consists of:   Immunosuppressants (From admission, onward)      Start     Stop Route Frequency Ordered    03/01/23 1800  tacrolimus capsule 2 mg         -- Oral 2 times daily 03/01/23 1151          Interval History: Patient seen and examined this AM. No acute events overnight. Afebrile with pulse ranging from  80-70s bpm. Systolic blood pressures ranging from 150-130s mmHg. He is saturating +90% on room air with documented UOP of 5.4 liters in the last 24 hours (net negative ~10.7 liters since admission). Serum potassium 3.3, bicarbonate 33, BUN 45 and creatinine 3.9. Tacrolimus trough this morning 6.3 (~12 hours).    Past Medical, Surgical, Family, and Social History:   Unchanged from H&P.    Scheduled Meds:   acetaminophen  1,000 mg Oral TID    albuterol-ipratropium  3 mL Nebulization Q4H WAKE    apixaban  5 mg Oral BID    aspirin  81 mg Oral Daily    atorvastatin  40 mg Oral QHS    calcium acetate(phosphat bind)  1,334 mg Oral TID WM    colchicine  0.3 mg Oral Daily    divalproex  500 mg Oral Daily PM    And    divalproex  250 mg  "Oral QAM    doxazosin  4 mg Oral Q12H    epoetin samara (PROCRIT) injection  20,000 Units Subcutaneous Q7 Days    famotidine  20 mg Oral Daily    fluticasone furoate-vilanteroL  1 puff Inhalation Daily    gabapentin  100 mg Oral QHS    hydrALAZINE  50 mg Oral TID    metoprolol succinate  25 mg Oral Daily    NIFEdipine  60 mg Oral BID    paricalcitoL  1 mcg Oral Daily    predniSONE  5 mg Oral Daily    tacrolimus  2 mg Oral BID     Continuous Infusions:   furosemide (LASIX) 10 mg/mL infusion (non-titrating) 10 mg/hr (03/01/23 1326)     PRN Meds:sodium chloride, sodium chloride, albuterol, albuterol-ipratropium, clonazePAM, melatonin, traMADoL    Intake/Output - Last 3 Shifts         02/28 0700  03/01 0659 03/01 0700 03/02 0659 03/02 0700 03/03 0659    P.O. 780      Total Intake(mL/kg) 780 (7.2)      Urine (mL/kg/hr) 5900 (2.3) 5400 (2.1)     Stool 0 0     Total Output 5900 5400     Net -5120 -5400            Stool Occurrence 0 x 2 x            Objective:     Vital Signs (Most Recent):  Temp: 98.2 °F (36.8 °C) (03/02/23 0753)  Pulse: 81 (03/02/23 0754)  Resp: 18 (03/02/23 0754)  BP: (!) 143/64 (03/02/23 0753)  SpO2: 97 % (03/02/23 0754)   Vital Signs (24h Range):  Temp:  [97.7 °F (36.5 °C)-98.2 °F (36.8 °C)] 98.2 °F (36.8 °C)  Pulse:  [68-86] 81  Resp:  [16-22] 18  SpO2:  [90 %-99 %] 97 %  BP: (124-152)/(59-72) 143/64     Weight: 108.4 kg (238 lb 15.7 oz)  Height: 6' 1" (185.4 cm)  Body mass index is 31.53 kg/m².    Physical Exam  Vitals and nursing note reviewed.   Constitutional:       General: He is sleeping. He is not in acute distress.     Appearance: He is well-developed. He is obese. He is ill-appearing. He is not diaphoretic.   HENT:      Head: Normocephalic and atraumatic.      Right Ear: External ear normal.      Left Ear: External ear normal.      Nose: Nose normal.      Mouth/Throat:      Comments: hoarseness  Eyes:      General: No scleral icterus.        Right eye: No discharge.         Left eye: No " discharge.      Extraocular Movements: Extraocular movements intact.      Conjunctiva/sclera: Conjunctivae normal.   Neck:      Vascular: No JVD.   Cardiovascular:      Rate and Rhythm: Normal rate.      Heart sounds: No murmur heard.    No friction rub. No gallop.   Pulmonary:      Effort: Pulmonary effort is normal. No respiratory distress.      Breath sounds: Rhonchi present. No wheezing or rales.      Comments: Diminished breath sounds bilaterally.  Abdominal:      General: Bowel sounds are normal. There is no distension.      Palpations: Abdomen is soft.      Tenderness: There is no abdominal tenderness.   Genitourinary:     Comments: Condom catheter in place.  Musculoskeletal:      Cervical back: Neck supple.      Right lower leg: No edema.      Left lower leg: No edema.   Skin:     General: Skin is warm and dry.      Coloration: Skin is not jaundiced.   Neurological:      Mental Status: He is easily aroused. Mental status is at baseline.      Cranial Nerves: No cranial nerve deficit.      Motor: Weakness present.   Psychiatric:         Mood and Affect: Mood normal.         Behavior: Behavior normal.       Laboratory:  CBC:   Recent Labs   Lab 02/28/23  0440 03/01/23 0224 03/02/23 0453   WBC 8.76 7.38 6.89   RBC 2.95* 3.21* 3.53*   HGB 7.3* 7.9* 8.6*   HCT 24.1* 25.7* 28.5*    259 282   MCV 82 80* 81*   MCH 24.7* 24.6* 24.4*   MCHC 30.3* 30.7* 30.2*     BMP:   Recent Labs   Lab 02/28/23  0440 03/01/23 0224 03/02/23 0453   GLU 84 83 63*    142 141   K 4.2 3.7 3.3*    102 97   CO2 22* 28 33*   BUN 48* 51* 45*   CREATININE 3.7* 3.9* 3.9*   CALCIUM 7.8* 8.1* 8.7     CMP:   Recent Labs   Lab 02/24/23  0819 02/25/23  1402 02/27/23  0051 02/27/23  0246 02/28/23  0440 03/01/23 0224 03/02/23 0453   GLU 95   < > 115*   < > 84 83 63*   CALCIUM 7.2*   < > 7.6*   < > 7.8* 8.1* 8.7   ALBUMIN 2.2*  --  2.2*  --   --   --   --    PROT  --   --  7.3  --   --   --   --       < > 136   < > 140 142  141   K 4.1   < > 4.0   < > 4.2 3.7 3.3*   CO2 19*   < > 22*   < > 22* 28 33*      < > 102   < > 104 102 97   BUN 41*   < > 50*   < > 48* 51* 45*   CREATININE 3.8*   < > 3.9*   < > 3.7* 3.9* 3.9*   ALKPHOS  --   --  78  --   --   --   --    ALT  --   --  20  --   --   --   --    AST  --   --  25  --   --   --   --     < > = values in this interval not displayed.     Recent Labs   Lab 02/27/23  0023 02/27/23  0549 02/27/23  0623   PH 7.490* 7.483* 7.472*   PCO2 31.1* 32.5* 35.9   HCO3 23.7* 24.4 26.2   POCSATURATED 84* 86* 99   BE 0 1 3     Diagnostic Results:  I have reviewed all imagining in the last 24 hours.

## 2023-03-02 NOTE — ASSESSMENT & PLAN NOTE
Baseline seems to be a creatinine in the 2s   Cr still elevated unchanged at 3.8 despite IVF. Scr increased to 4.0 --> 3.8  US no overly revealing; Transplant working up rejection  - maintain urinary output but creatinine seems to be plateauing now at 3.8-4.0.  Patient affirms he does not want dialysis.    -switching from Lasix drip to bolus IV Lasix push per Nephrology

## 2023-03-02 NOTE — PROGRESS NOTES
[facility] - Intensive Care ([facility]-)  Kidney Transplant  Progress Note      Reason for Follow-up: Reassessment of Kidney Transplant - 4/12/2005 - Not Followed  (#2) recipient and management of immunosuppression.      Subjective:   History of Present Illness:  67 year old man with past medical history of kidney transplant times 2 first one in 1992 and then again in 2005. Has CKD with baseline creatinine high 2s. Recently admitted earlier this month with COVID. Was admitted with KATINA with cr 3.8 on admit. Last admission when he was admitted with COVID his cr did peak at 3.8 but when he was on 2/10 his cr was 2.6 at baseline. Patient denies any diarrhea after discharge from hospital. Says that he has a hard time urinating but no dysuria, no hematuria , no flank pain , no fevers. Was on keflex for mild cellulitis. Denies NSAID use. This morning patient was wheezing and had some SOB. He was started on IVF NS with no improvement in scr this morning was 3.8    Mr. Phelps is a 67 y.o. year old male who is status post Kidney Transplant - 4/12/2005 - Not Followed  (#2).    His maintenance immunosuppression consists of:   Immunosuppressants (From admission, onward)      Start     Stop Route Frequency Ordered    03/01/23 1800  tacrolimus capsule 2 mg         -- Oral 2 times daily 03/01/23 1151          Interval History: Patient seen and examined this AM. No acute events overnight. Afebrile with pulse ranging from  80-70s bpm. Systolic blood pressures ranging from 150-130s mmHg. He is saturating +90% on room air with documented UOP of 5.4 liters in the last 24 hours (net negative ~10.7 liters since admission). Serum potassium 3.3, bicarbonate 33, BUN 45 and creatinine 3.9. Tacrolimus trough this morning 6.3 (~12 hours).    Past Medical, Surgical, Family, and Social History:   Unchanged from H&P.    Scheduled Meds:   acetaminophen  1,000 mg Oral TID    albuterol-ipratropium  3 mL Nebulization Q4H WAKE    apixaban  5 mg Oral  "BID    aspirin  81 mg Oral Daily    atorvastatin  40 mg Oral QHS    calcium acetate(phosphat bind)  1,334 mg Oral TID WM    colchicine  0.3 mg Oral Daily    divalproex  500 mg Oral Daily PM    And    divalproex  250 mg Oral QAM    doxazosin  4 mg Oral Q12H    epoetin samara (PROCRIT) injection  20,000 Units Subcutaneous Q7 Days    famotidine  20 mg Oral Daily    fluticasone furoate-vilanteroL  1 puff Inhalation Daily    gabapentin  100 mg Oral QHS    hydrALAZINE  50 mg Oral TID    metoprolol succinate  25 mg Oral Daily    NIFEdipine  60 mg Oral BID    paricalcitoL  1 mcg Oral Daily    predniSONE  5 mg Oral Daily    tacrolimus  2 mg Oral BID     Continuous Infusions:   furosemide (LASIX) 10 mg/mL infusion (non-titrating) 10 mg/hr (03/01/23 1326)     PRN Meds:sodium chloride, sodium chloride, albuterol, albuterol-ipratropium, clonazePAM, melatonin, traMADoL    Intake/Output - Last 3 Shifts         02/28 0700  03/01 0659 03/01 0700  03/02 0659 03/02 0700  03/03 0659    P.O. 780      Total Intake(mL/kg) 780 (7.2)      Urine (mL/kg/hr) 5900 (2.3) 5400 (2.1)     Stool 0 0     Total Output 5900 5400     Net -5120 -5400            Stool Occurrence 0 x 2 x            Objective:     Vital Signs (Most Recent):  Temp: 98.2 °F (36.8 °C) (03/02/23 0753)  Pulse: 81 (03/02/23 0754)  Resp: 18 (03/02/23 0754)  BP: (!) 143/64 (03/02/23 0753)  SpO2: 97 % (03/02/23 0754)   Vital Signs (24h Range):  Temp:  [97.7 °F (36.5 °C)-98.2 °F (36.8 °C)] 98.2 °F (36.8 °C)  Pulse:  [68-86] 81  Resp:  [16-22] 18  SpO2:  [90 %-99 %] 97 %  BP: (124-152)/(59-72) 143/64     Weight: 108.4 kg (238 lb 15.7 oz)  Height: 6' 1" (185.4 cm)  Body mass index is 31.53 kg/m².    Physical Exam  Vitals and nursing note reviewed.   Constitutional:       General: He is sleeping. He is not in acute distress.     Appearance: He is well-developed. He is obese. He is ill-appearing. He is not diaphoretic.   HENT:      Head: Normocephalic and atraumatic.      " Right Ear: External ear normal.      Left Ear: External ear normal.      Nose: Nose normal.      Mouth/Throat:      Comments: hoarseness  Eyes:      General: No scleral icterus.        Right eye: No discharge.         Left eye: No discharge.      Extraocular Movements: Extraocular movements intact.      Conjunctiva/sclera: Conjunctivae normal.   Neck:      Vascular: No JVD.   Cardiovascular:      Rate and Rhythm: Normal rate.      Heart sounds: No murmur heard.    No friction rub. No gallop.   Pulmonary:      Effort: Pulmonary effort is normal. No respiratory distress.      Breath sounds: Rhonchi present. No wheezing or rales.      Comments: Diminished breath sounds bilaterally.  Abdominal:      General: Bowel sounds are normal. There is no distension.      Palpations: Abdomen is soft.      Tenderness: There is no abdominal tenderness.   Genitourinary:     Comments: Condom catheter in place.  Musculoskeletal:      Cervical back: Neck supple.      Right lower leg: No edema.      Left lower leg: No edema.   Skin:     General: Skin is warm and dry.      Coloration: Skin is not jaundiced.   Neurological:      Mental Status: He is easily aroused. Mental status is at baseline.      Cranial Nerves: No cranial nerve deficit.      Motor: Weakness present.   Psychiatric:         Mood and Affect: Mood normal.         Behavior: Behavior normal.       Laboratory:  CBC:   Recent Labs   Lab 02/28/23  0440 03/01/23 0224 03/02/23 0453   WBC 8.76 7.38 6.89   RBC 2.95* 3.21* 3.53*   HGB 7.3* 7.9* 8.6*   HCT 24.1* 25.7* 28.5*    259 282   MCV 82 80* 81*   MCH 24.7* 24.6* 24.4*   MCHC 30.3* 30.7* 30.2*     BMP:   Recent Labs   Lab 02/28/23  0440 03/01/23  0224 03/02/23 0453   GLU 84 83 63*    142 141   K 4.2 3.7 3.3*    102 97   CO2 22* 28 33*   BUN 48* 51* 45*   CREATININE 3.7* 3.9* 3.9*   CALCIUM 7.8* 8.1* 8.7     CMP:   Recent Labs   Lab 02/24/23  0819 02/25/23  1402 02/27/23  0051 02/27/23  0246 02/28/23  0440  03/01/23  0224 03/02/23  0453   GLU 95   < > 115*   < > 84 83 63*   CALCIUM 7.2*   < > 7.6*   < > 7.8* 8.1* 8.7   ALBUMIN 2.2*  --  2.2*  --   --   --   --    PROT  --   --  7.3  --   --   --   --       < > 136   < > 140 142 141   K 4.1   < > 4.0   < > 4.2 3.7 3.3*   CO2 19*   < > 22*   < > 22* 28 33*      < > 102   < > 104 102 97   BUN 41*   < > 50*   < > 48* 51* 45*   CREATININE 3.8*   < > 3.9*   < > 3.7* 3.9* 3.9*   ALKPHOS  --   --  78  --   --   --   --    ALT  --   --  20  --   --   --   --    AST  --   --  25  --   --   --   --     < > = values in this interval not displayed.     Recent Labs   Lab 02/27/23  0023 02/27/23  0549 02/27/23  0623   PH 7.490* 7.483* 7.472*   PCO2 31.1* 32.5* 35.9   HCO3 23.7* 24.4 26.2   POCSATURATED 84* 86* 99   BE 0 1 3     Diagnostic Results:  I have reviewed all imagining in the last 24 hours.    Assessment/Plan:     * Acute kidney injury superimposed on CKD  H/O kidney transplant  - trasnsplant kidney 2x, first one was in 1992 and second one was 2005 patient on Prograf 2.5 mg BID and prednisone 5 mg PO daily as outpatient   - KATINA on CKD (baseline creatinine ~2.6-2.8), most consistent with progressive failing kidney transplant  - UA bland without evidence of hydronephrosis of renal graft noted with ultrasound, defer renal biopsy at this time  - would transition from Lasix infusion to IV boluses at 80 mg BID for next 24 hours and assess urinary output/response   - can continue calcium acetate 1,334 mg TIDWM and paricalitol 1 mcg daily for now  - renal diet when not NPO  - daily RFPs & magnesium  - daily weights and strict I/Os  - renally dose medications to eGFR  - avoid nephrotoxins as much as possible (i.e. supra-therapeutic vancomycin troughs, NSAIDs, intra-arterial contrast, etc.)    Long-term use of immunosuppressant medication  - outpatient regimen consists of tacrolimus 2.5 mg BID and prednisone 5 mg daily  - will continue with tacrolimus 2 mg BID and  prednisone 5 mg daily  - goal Prograf trough (4-6), near goal today with value of 6.3 (~12 hour trough)  - daily tacrolimus troughs  - continue to monitor for toxicity    Anemia due to stage 4 chronic kidney disease  - hemoglobin improving now with value of 8.6  - continue epogen 20,000 units once weekly     Hypertension  - management per primary  - currently on hydralazine 50 mg TID, Toprol-XL 25 mg daily, nifedipine 60 mg BID in addition to doxazosin 4 mg Q12H    Cellulitis of foot  - management per primary team    Víctor Enamorado MD  Kidney Transplant  [facility] - Intensive Care ([facility]

## 2023-03-02 NOTE — ASSESSMENT & PLAN NOTE
- trasnsplant kidney 2x, first one was in 1992 and second one was 2005 patient on Prograf 2.5 mg BID and prednisone 5 mg PO daily as outpatient   - KATINA on CKD (baseline creatinine ~2.6-2.8), most consistent with progressive failing kidney transplant  - UA bland without evidence of hydronephrosis of renal graft noted with ultrasound, defer renal biopsy at this time  - would transition from Lasix infusion to IV boluses at 80 mg BID for next 24 hours and assess urinary output/response   - can continue calcium acetate 1,334 mg TIDWM and paricalitol 1 mcg daily for now  - renal diet when not NPO  - daily RFPs & magnesium  - daily weights and strict I/Os  - renally dose medications to eGFR  - avoid nephrotoxins as much as possible (i.e. supra-therapeutic vancomycin troughs, NSAIDs, intra-arterial contrast, etc.)

## 2023-03-02 NOTE — ASSESSMENT & PLAN NOTE
- patient expressed that he would not like to pursue dialysis if renal failure progressed   - Palliative care consulted. apprec recs

## 2023-03-02 NOTE — PROGRESS NOTES
[facility] ([place] [address])  Palliative Medicine  Progress Note    Patient Name: Ibrahima Phelps  MRN: [id number]  Admission Date: 2/20/2023  Hospital Length of Stay: 10 days  Code Status: Full Code   Attending Provider: Russ Vizcaino MD  Consulting Provider: TRUDI Garzon  Primary Care Physician: Connie Magdaleno MD  Principal Problem:Acute kidney injury superimposed on CKD    Patient information was obtained from patient and primary team.      Assessment/Plan:     Palliative Care  Palliative care encounter  Impression: Pt is a 66 y/o male with renal transplant who was admitted for acute on chronic kidney injury. Pt had kidney transplant times 2- first one in 1992 and then again in 2005. Pt is alert, oriented to person, place, and situation for most part. Pt has sitter cam at bedside. Pt is a full code.     Reason for consult: GOC/ACP. Pt has KATINA on CKD. Pt does not want dialysis if needed in future.     Goals of care/ACP:    Today: Pt sleeping. Sitter cam at bedside. Pal care will continue to follow along.      3/1/23  Met with pt and pt's wife. Pt and wife aware pt having acute kidney injury on CKD. Per pt, he would never want to go back on dialysis if needed in future. Pt's wife is aware. Pt concerned his wishes may not be honored.  Explained to pt and wife, that is pt's goal is not to have dialysis, his wishes would be honored. Discussed with pt and wife, in future if pt needs dialysis and pt does not want it, focus would change to comfort/QOL. Hospice care discussed.     At this time, plan is to continue medical management. Pal care will follow along   MPOA: Pt's wife is NOK.     Symptom management:     Debility:   PT/Ot seeing pt.   SNF recommended.     Delirium  Pt is alert and oriented to person, place, and situation for most part.   - started depakote. Weaned down to depakote 250 mg nightly   - delirium precautions    Pt appears comfortable. No signs of distress noted.     Plan:  Pal  care will continue to follow for ACP.                     I will follow-up with patient. Please contact us if you have any additional questions.    Subjective:     Chief Complaint:   Chief Complaint   Patient presents with    Shortness of Breath     Reports home O2 sat 88% COVID + 13 days ago.       HPI:   Pt is a 67-year-old black male transplant kidney patient being admitted for KATINA.  Patient was just discharged from a hospitalization for COVID pneumonia on 02/10/2023.  He also paid in ED visit a few days ago for what could have been a mild cellulitis/folliculitis on his foot for which he is almost done with his Keflex regimen.      Per chart review, patient was instructed to come to the ED this time by the COVID surveillance nurses due to desaturation down to 88%.  When I talked with the patient and his wife in detail, they affirmed that he was not having any new or worsening shortness of breath since his discharge.  His wife did report that once the patient woke up/sat up his desaturation spontaneously resolved.  Patient denies any steven current shortness of breath or chest pain.  No nausea vomiting or flu fever like feeling.  In my discussion with the ED provider who was noted that the patient was complaining of shortness of breath but there was a concern that this likely was anxiety triggered.  Patient seems to be saturating well on room air here in the ED and chest x-ray which I personally reviewed is clear bilaterally.  Wife reports patient has been compliant with his medications.    The blood work did demonstrate an acutely elevated creatinine of 3.8 whereas his baseline       Hospital Course:  No notes on file    Interval History:  Pt renal transplant x 2.     Past Medical History:   Diagnosis Date    (HFpEF) heart failure with preserved ejection fraction 9/25/2017    Atrial fibrillation     CAD (coronary artery disease)     CHF (congestive heart failure)     Chronic diastolic heart failure 4/8/2020  "   CKD (chronic kidney disease) stage 3, GFR 30-59 ml/min     Dr. Latif    CKD (chronic kidney disease) stage 3, GFR 30-59 ml/min     Diverticulosis     Fever blister     Heart attack 2006    Hyperlipidemia     Hypertension     Immunodeficiency due to treatment with immunosuppressive medication     Keloid cicatrix     Left lower quadrant abdominal pain 6/5/2022    Metabolic bone disease     Pericarditis     S/P kidney transplant     Supraventricular tachycardia 06/2019    Thrombocytopenia     Thyroid disease     Tobacco use 9/5/2014    Unspecified disorder of kidney and ureter     Stage IIICKD       Past Surgical History:   Procedure Laterality Date    CARDIOVERSION  04/30/15    CHOLECYSTECTOMY      COLONOSCOPY  April 20, 2011    Diverticulosis, repeat recommended in 3 yrs., repeat colonoscopy 2014 revealed 2 polyps.  He should return in 5 years.    COLONOSCOPY N/A 3/13/2020    Procedure: COLONOSCOPY;  Surgeon: Chon Casper MD;  Location: [facility] (4TH FLR);  Service: Endoscopy;  Laterality: N/A;  ok to hold coumadin x5days-see telephone encounter 2/4/20-tb    COLONOSCOPY N/A 2/4/2021    Procedure: COLONOSCOPY;  Surgeon: Chon Casper MD;  Location: [facility] (4TH FLR);  Service: Endoscopy;  Laterality: N/A;  Eliquis - per Dr. GAVIN Blunt ok to hold x 2 days and "restarted asap"- ERW  last prep "poor", [id number] ordered/ Pt requests Dr. Casper  prep ins. mailed - COVID screening on 2/1/21 PCW- ERW    COLONOSCOPY N/A 3/3/2021    Procedure: COLONOSCOPY;  Surgeon: Chon Casper MD;  Location: [facility] (4TH FLR);  Service: Endoscopy;  Laterality: N/A;  Patient with his second poor bowel pre and has poor prep today.  If patient not intersted or can't get colonoscopy tomorrow will need constipation bowel prep and will need to restart his Eliquis today.Thanks,Chon Blunt-ok to hold Eliquis 2 days prior      COVID     CORONARY ANGIOPLASTY WITH STENT PLACEMENT  9/13/2006    " KIDNEY TRANSPLANT      PARATHYROIDECTOMY      TREATMENT OF CARDIAC ARRHYTHMIA N/A 3/28/2022    Procedure: CARDIOVERSION;  Surgeon: Migue Blunt MD;  Location: [facility];  Service: Cardiology;  Laterality: N/A;  AF, DCC, MAC, GP, 3 PREP*RODRIGO deferred pt 100% compliant*       Review of patient's allergies indicates:   Allergen Reactions    Ace inhibitors Swelling    Verapamil Other (See Comments)     Other reaction(s): Unknown    Povidone-iodine Itching       Medications:  Continuous Infusions:   furosemide (LASIX) 10 mg/mL infusion (non-titrating) 10 mg/hr (23 1326)     Scheduled Meds:   acetaminophen  1,000 mg Oral TID    albuterol-ipratropium  3 mL Nebulization Q4H WAKE    apixaban  5 mg Oral BID    aspirin  81 mg Oral Daily    atorvastatin  40 mg Oral QHS    calcium acetate(phosphat bind)  1,334 mg Oral TID WM    divalproex  500 mg Oral Daily PM    And    divalproex  250 mg Oral QAM    doxazosin  4 mg Oral Q12H    epoetin samara (PROCRIT) injection  20,000 Units Subcutaneous Q7 Days    famotidine  20 mg Oral Daily    fluticasone furoate-vilanteroL  1 puff Inhalation Daily    gabapentin  100 mg Oral QHS    hydrALAZINE  50 mg Oral TID    metoprolol succinate  25 mg Oral Daily    NIFEdipine  60 mg Oral BID    paricalcitoL  1 mcg Oral Daily    predniSONE  5 mg Oral Daily    tacrolimus  2 mg Oral BID     PRN Meds:sodium chloride, sodium chloride, albuterol, albuterol-ipratropium, clonazePAM, melatonin, traMADoL    Family History       Problem Relation (Age of Onset)    Heart disease Father    Kidney disease Sister, Brother    Kidney failure Sister, Brother    No Known Problems Sister, Brother, Sister, Sister    Thyroid disease Mother          Tobacco Use    Smoking status: Former     Packs/day: 0.50     Years: 40.00     Pack years: 20.00     Types: Cigarettes     Quit date: 2022     Years since quittin.0    Smokeless tobacco: Never    Tobacco comments:     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 He is not exercising.     Patient is currently retired   Substance and Sexual Activity    Alcohol use: No    Drug use: No    Sexual activity: Yes     Partners: Female       Review of Systems   Constitutional:  Positive for activity change and fatigue.   Respiratory:  Positive for shortness of breath.    Gastrointestinal:  Negative for abdominal distention.   Neurological:  Positive for weakness.   Psychiatric/Behavioral:  Positive for decreased concentration.    Objective:     Vital Signs (Most Recent):  Temp: 98.2 °F (36.8 °C) (03/02/23 0753)  Pulse: 81 (03/02/23 1032)  Resp: 18 (03/02/23 0754)  BP: (!) 143/64 (03/02/23 0753)  SpO2: 97 % (03/02/23 0754)   Vital Signs (24h Range):  Temp:  [97.7 °F (36.5 °C)-98.2 °F (36.8 °C)] 98.2 °F (36.8 °C)  Pulse:  [68-86] 81  Resp:  [16-22] 18  SpO2:  [90 %-99 %] 97 %  BP: (124-152)/(59-72) 143/64     Weight: 110.5 kg (243 lb 9.7 oz)  Body mass index is 32.14 kg/m².    Physical Exam  Constitutional:       General: He is not in acute distress.     Appearance: He is well-developed.      Comments: Sitter cam at bedside   HENT:      Head: Normocephalic and atraumatic.   Eyes:      General: Lids are normal.      Conjunctiva/sclera: Conjunctivae normal.   Cardiovascular:      Rate and Rhythm: Normal rate and regular rhythm.   Pulmonary:      Effort: Pulmonary effort is normal. No respiratory distress.   Abdominal:      Tenderness: There is no abdominal tenderness.   Musculoskeletal:      Cervical back: Full passive range of motion without pain and normal range of motion.      Comments: Normal ROM   Skin:     General: Skin is warm and dry.   Neurological:      Mental Status: He is alert and oriented to person, place, and time.   Psychiatric:         Attention and Perception: He is inattentive.       Review of Symptoms      Symptom Assessment (ESAS 0-10 Scale)  Pain:  0  Dyspnea:  0  Anxiety:  0  Nausea:  0  Depression:  0  Anorexia:   0  Fatigue:  0  Insomnia:  0  Restlessness:  0  Agitation:  0         Performance Status:  60    Living Arrangements:  Lives with family    Psychosocial/Cultural:   See Palliative Psychosocial Note: No  Pt  to spouse.  Pt had renal transplant x 2. Pt wife has been caregiver.   **Primary  to Follow**  Palliative Care  Consult: No      Advance Care Planning   Advance Directives:   Living Will: No    Do Not Resuscitate Status: No    Medical Power of : No    Agent's Name:  Pt's wife is NOK    Decision Making:  Patient answered questions and Family answered questions  Goals of Care: What is most important right now is to focus on spending time at home, remaining as independent as possible, symptom/pain control, quality of life, even if it means sacrificing a little time. Accordingly, we have decided that the best plan to meet the patient's goals includes continuing with treatment.       Significant Labs: All pertinent labs within the past 24 hours have been reviewed.  CBC:   Recent Labs   Lab 03/02/23 0453   WBC 6.89   HGB 8.6*   HCT 28.5*   MCV 81*          BMP:  Recent Labs   Lab 03/02/23 0453   GLU 63*      K 3.3*   CL 97   CO2 33*   BUN 45*   CREATININE 3.9*   CALCIUM 8.7       LFT:  Lab Results   Component Value Date    AST 25 02/27/2023    ALKPHOS 78 02/27/2023    BILITOT 0.4 02/27/2023     Albumin:   Albumin   Date Value Ref Range Status   02/27/2023 2.2 (L) 3.5 - 5.2 g/dL Final     Protein:   Total Protein   Date Value Ref Range Status   02/27/2023 7.3 6.0 - 8.4 g/dL Final     Lactic acid:   Lab Results   Component Value Date    LACTATE 1.5 02/22/2023    LACTATE 1.9 02/20/2023       Significant Imaging: I have reviewed all pertinent imaging results/findings within the past 24 hours.      > 50% of 50  min visit spent in chart review, face to face discussion of goals of care, charting, symptom assessment, coordination of care and emotional support    Lindsey GOMEZ  Nafisa, CNS  Palliative Medicine  [facility] ([facility]

## 2023-03-02 NOTE — ASSESSMENT & PLAN NOTE
Advance Care Planning     [facility]  I engaged the patient and family in a conversation about advance care planning and we specifically addressed what the goals of care would be moving forward, in light of the patient's change in clinical status, specifically acute renal failure in the setting of suspected chronic rejection.  We did specifically address the patient's likely prognosis, which is fair .  We explored the patient's values and preferences for future care.  The patient and family endorses that what is most important right now is to focus on spending time at home, remaining as independent as possible, symptom/pain control and quality of life, even if it means sacrificing a little time    Accordingly, we have decided that the best plan to meet the patient's goals includes continuing with treatment    I did explain the role for hospice care at this stage of the patient's illness, including its ability to help the patient live with the best quality of life possible.  We will not be making a hospice referral at this time. Will continue [facility] discussions.    I spent a total of 45 minutes engaging the patient in this advance care planning discussion.

## 2023-03-02 NOTE — MEDICAL/APP STUDENT
Progress Note  Hospital Medicine    Patient Name: Ibrahima Phelps  YOB: 1955    Admit Date: 2/20/2023                     LOS: 10    SUBJECTIVE:     Reason for Admission:  Acute kidney injury superimposed on CKD    HPI:  67-year-old black male transplant kidney patient being admitted for KATINA.  Patient was just discharged from a hospitalization for COVID pneumonia on 02/10/2023.  He also paid in ED visit a few days ago for what could have been a mild cellulitis/folliculitis on his foot for which he is almost done with his Keflex regimen.    Patient was instructed to come to the ED this time by the COVID surveillance nurses due to desaturation down to 88%.  When I talked with the patient and his wife in detail, they affirmed that he was not having any new or worsening shortness of breath since his discharge.  His wife did report that once the patient woke up/sat up his desaturation spontaneously resolved.  Patient denies any setven current shortness of breath or chest pain.  No nausea vomiting or flu fever like feeling.  In my discussion with the ED provider who was noted that the patient was complaining of shortness of breath but there was a concern that this likely was anxiety triggered.  Patient seems to be saturating well on room air here in the ED and chest x-ray which I personally reviewed is clear bilaterally.  Wife reports patient has been compliant with his medications.    The blood work did demonstrate an acutely elevated creatinine of 3.8 whereas his baseline is in the 2s.    Hospital Course:  67-year-old renal transplant black male was admitted for acute on chronic kidney injury.  Patient was just discharged from hospitalization for COVID pneumonia on 02/10/2023.  Presented to the ED per recommendations of the COVID surveillance team.  Did not have any definitive acute respiratory findings to indicate acute ongoing infectious COVID, chest x-ray was clear.  However creatinine was significantly  "elevated up to 3.8 whereas baseline was in the 2s.  Was started on IV fluids with trivial improvement.    Interval history: Palliative, PT/OT saw the patients. Patient has made his wishes clear, does not want to be on dialysis. Palliative will continue to follow. PT/OT rec SNF.    Patient continued to have delirium events overnight, even when given 500mg Depakote in the evening 01-MAR. Wife is frustrated, saying "she would rather just take him home, he is just getting worse."    Review of Systems:  Review of Systems   Unable to perform ROS    Patient was delirious this morning    OBJECTIVE:     Vitals:    03/02/23 0404 03/02/23 0656 03/02/23 0753 03/02/23 0754   BP: (!) 147/70  (!) 143/64    BP Location: Left arm  Left arm    Patient Position: Lying  Lying    Pulse: 81 81 86 81   Resp: 17  18 18   Temp: 98.1 °F (36.7 °C)  98.2 °F (36.8 °C)    TempSrc: Oral  Oral    SpO2: (!) 90%  96% 97%   Weight:       Height:            Vital Signs Range (Last 24H):  Temp:  [97.7 °F (36.5 °C)-98.2 °F (36.8 °C)]   Pulse:  [68-86]   Resp:  [16-22]   BP: (124-152)/(59-72)   SpO2:  [90 %-99 %]     Body mass index is 31.53 kg/m².    Wt Readings from Last 3 Encounters:   02/27/23 0648 108.4 kg (238 lb 15.7 oz)   02/20/23 1158 101.6 kg (224 lb)   02/10/23 0513 101.6 kg (224 lb)   02/07/23 2305 101.9 kg (224 lb 10.4 oz)   02/07/23 1102 107.5 kg (237 lb)   01/12/23 1048 103.6 kg (228 lb 6.3 oz)       Physical Exam:  Physical Exam  Vitals and nursing note reviewed. Exam conducted with a chaperone present.   Constitutional:       General: He is awake.      Appearance: He is obese. He is ill-appearing.      Comments: Room air   HENT:      Head: Normocephalic.   Eyes:      Extraocular Movements: Extraocular movements intact.      Pupils: Pupils are equal, round, and reactive to light.   Neck:      Vascular: No JVD.   Cardiovascular:      Rate and Rhythm: Normal rate. Rhythm irregular.      Pulses: Normal pulses.      Heart sounds: Normal heart " sounds.   Pulmonary:      Effort: Pulmonary effort is normal.      Breath sounds: Examination of the right-lower field reveals decreased breath sounds and rales. Decreased breath sounds and rales present.      Comments: Seems to be improved compared to yesterday  Abdominal:      General: Bowel sounds are normal.      Palpations: Abdomen is soft.      Tenderness: There is no abdominal tenderness.   Musculoskeletal:      Right lower leg: No edema.      Left lower leg: No edema.      Right ankle: No tenderness.   Neurological:      Mental Status: He is disoriented and confused.   Psychiatric:         Attention and Perception: He is inattentive.         Mood and Affect: Mood normal. Affect is flat.         Behavior: Behavior is uncooperative and withdrawn.         Judgment: Judgment normal.      Comments: Patient seemed delirious during the conversation, about the same as yesterday.       I & O (Last 24H):  Intake/Output Summary (Last 24 hours) at 3/2/2023 0840  Last data filed at 3/2/2023 0529  Gross per 24 hour   Intake --   Output 5400 ml   Net -5400 ml       Significant Labs:    CBC  Recent Labs   Lab 02/28/23  0440 03/01/23  0224 03/02/23  0453   WBC 8.76 7.38 6.89   HGB 7.3* 7.9* 8.6*   HCT 24.1* 25.7* 28.5*    259 282       CMP  Recent Labs   Lab 02/24/23  0819 02/25/23  1402 02/26/23  0532 02/27/23  0051 02/27/23  0246 02/28/23  0440 03/01/23  0224 03/02/23  0453      < > 135* 136 138 140 142 141   K 4.1   < > 4.1 4.0 4.7 4.2 3.7 3.3*      < > 104 102 104 104 102 97   CO2 19*   < > 18* 22* 22* 22* 28 33*   ANIONGAP 12   < > 13 12 12 14 12 11   BUN 41*   < > 48* 50* 46* 48* 51* 45*   CREATININE 3.8*   < > 3.7* 3.9* 3.8* 3.7* 3.9* 3.9*   GLU 95   < > 80 115* 89 84 83 63*   CALCIUM 7.2*   < > 7.3* 7.6* 7.7* 7.8* 8.1* 8.7   MG  --    < > 2.3 2.3 2.5  --   --   --    PHOS 6.4*   < > 6.2* 5.9* 6.2*  --   --   --    ALKPHOS  --   --   --  78  --   --   --   --    ALT  --   --   --  20  --   --   --    --    AST  --   --   --  25  --   --   --   --    ALBUMIN 2.2*  --   --  2.2*  --   --   --   --    PROT  --   --   --  7.3  --   --   --   --    BILITOT  --   --   --  0.4  --   --   --   --     < > = values in this interval not displayed.      Lab Results   Component Value Date    INR 1.2 02/07/2023    INR 1.1 03/21/2022    INR 1.2 06/17/2020    INR 1.2 06/17/2020     Lab Results   Component Value Date    HGBA1C 5.5 01/04/2023     Recent Labs     03/02/23  0750   POCTGLUCOSE 79       Urinalysis  No results for input(s): COLORU, CLARITYU, SPECGRAV, PHUR, PROTEINUA, GLUCOSEU, BILIRUBINCON, BLOODU, WBCU, RBCU, BACTERIA, MUCUS, NITRITE, LEUKOCYTESUR, UROBILINOGEN, HYALINECASTS in the last 24 hours.     Microbiology  Microbiology Results (last 7 days)       Procedure Component Value Units Date/Time    Respiratory Infection Panel (PCR), Nasopharyngeal [[id number]]     Order Status: No result Specimen: Nasopharyngeal Swab     Aerobic culture [[id number]]     Order Status: No result Specimen: Pleural Fluid     Culture, Anaerobic [[id number]]     Order Status: No result Specimen: Pleural Fluid     Gram stain [[id number]]     Order Status: No result Specimen: Pleural Fluid     AFB Culture & Smear [[id number]]     Order Status: No result Specimen: Pleural Fluid              Imaging  Imaging Results              X-Ray Chest AP Portable (Final result)  Result time 02/20/23 12:35:47      Final result by Bryon Nguyen MD (02/20/23 12:35:47)                   Impression:      See above      Electronically signed by: Bryon Nguyen MD  Date:    02/20/2023  Time:    12:35               Narrative:    EXAMINATION:  XR CHEST AP PORTABLE    CLINICAL HISTORY:  COVID-19;    TECHNIQUE:  Single frontal view of the chest was performed.    COMPARISON:  No 02/07/2023 ne    FINDINGS:  Heart size normal.  No significant airspace consolidation or pleural effusion identified                                     ASSESSMENT/PLAN:     Mr. Ibrahima Phelps is  a 67 y.o. man with a kidney transplant currently on immunosuppressants and CKD4, HTN, CAD, pAF, HFpEF, anemia of renal disease, recently discharge 2/2 COVID pneumonia 02/10. Presented with oxygen desaturation to 88%, but was not having worsened dyspnea or SOB.    1. Long-term use of immunosuppressant medication    2. SOB (shortness of breath)    3. COVID-19    4. Atrial fibrillation    5. H/O kidney transplant    6. Hypoxia    7. Acute kidney injury superimposed on CKD    8. Shortness of breath    9. Palliative care encounter    10. Goals of care, counseling/discussion    11. Advance care planning    12. Debility        # Acute kidney injury superimposed on CKD  Mr. Phelps is a 68 yo man with a kidney transplant currently on immunosuppressants and CKD4, HTN, CAD, pAF, HFpEF, anemia of renal disease, recently discharge 2/2 COVID pneumonia 02/10. Presented with oxygen desaturation to 88%, but was not having worsened dyspnea or SOB.  - recent labs show elevated BUN and Cr (48/3.7 both consistent with previous CMP), average 2.8  - anemia of renal disease evident H/H 7.3/24.1 --> improvements see 02-MAR 8.6/25.7  - After discussion with kidney transplant, aim to hav 3-4 L UOP within 24 hours, increase Lasix drip to 15 mg/h and add 500 mg BID chlorothiazide. Potentially push the kidney if this is due to fluid overload; otherwise, patient might be looking at dialysis  - UOP 01-Mar 5.9 L on 15 mg/h Lasix and 500 mg BID Diuril --> Diuril d/c and Lasix dec to 10mg/h  - UOP 02-MAR 5.4L on 10mg/h lasix still above out goal output, but no change to Cr between 01 and 02 MAR, maybe maintain.  - Seen by palliative care, GOC were had, patient has stated he does not want to be on dialysis. Palliative will be continuing to follow    PLAN:  [  ] continue IVF PRN  [  ] daily BMP  [  ] continue home immunosuppressant meds  [  ] Epoetin samara injection 20,000 U Q7d  [  ] cont Lasix drip 10 mg/h  [  ] Palliative and Kidney transplant  following    # Delirium  - overnight patient was delirious per wife and nurse  - had reduced the dose of divalproex EC, gave 500mg divalproex in the evening 01-MAR    PLAN:  [  ] Consult Psych for better delirium control    # Fall overnight  - notes indicate the patient had an unwitnessed fall. Both the patient and his wife say it was not a fall, he got tangled in the wires when trying to get out of bed.  - CT Head non con ordered - NORMAL    # hypoxic event overnight  - patient oxygen sat dropped into the 60s, placed on breathing treatment per RR, Sat O2 80s (26-27 Feb)  - suspected fluid overload to be the cause of event, CXR showing decreased R hemidiaphragm angle, possible pleural effusion  - 27 Feb ABG AM pH 7.472, pCO2 35.9, PO2 118, pHCo3 26.2 (improved compared to yesterday 7.483/32.5/47/24.4)  - CT Abdomen/Pelvis shows bilateral pleural effusion    PLAN:  [  ] Lasix drip 10 mg/h (500 mg/24h)    # H/o Kidney transplant  - see KATINA on CKD  - Consulted palliative care - patient wishes for no dialysis    # Wheezing  - albuterol PRN    # anemia of renal disease  - see KATINA on CKD  - H/H improving    # HFpEF  - daily weight, I/Os, continue home metoprolol, daily BNP  - Drip 10 mg/h (500 mg/24h)  - CT Abdomen/Pelvis shows bilateral pleural effusion    # pAF  - home meds eliquis, metoprolol    # CAD  - home statin and aspirin, metoprolol    # HTN  - continue metoprolol, nifedipine, and hydralazine    # DVT Ppx  - Eliquis 5 mg restarted following normal (no bleeds) CT Abdomen and pelvis      VTE Risk Mitigation (From admission, onward)           Ordered     apixaban tablet 5 mg  2 times daily         02/28/23 1904     Place sequential compression device  Until discontinued         02/27/23 1212                    DISCHARGE PLANNING:  Discharge Planning   RAMU: 3/6/2023     Code Status: Full Code   Is the patient medically ready for discharge?: No    Reason for patient still in hospital (select all that apply): Patient  unstable, Patient trending condition, Laboratory test, Treatment, Consult recommendations, and PT / OT recommendations  Discharge Plan A: Home with family          Signing Physician:  Malik Welch MS4  [facility] R

## 2023-03-02 NOTE — SUBJECTIVE & OBJECTIVE
Interval History:  No acute issues noted overnight, afebrile.  Wife thinks patient is much better since the time of admission.  Patient denies any steven shortness of breath that is getting worse and that is new today.  No chest pain.  Creatinine holding steady around 3.94.0, maintaining good urinary output..  Successfully voiding after Hamilton removed per nursing.      Review of Systems  Objective:     Vital Signs (Most Recent):  Temp: 99.1 °F (37.3 °C) (03/02/23 1523)  Pulse: 81 (03/02/23 1546)  Resp: 18 (03/02/23 1546)  BP: (!) 164/74 (03/02/23 1523)  SpO2: 96 % (03/02/23 1546)   Vital Signs (24h Range):  Temp:  [97.7 °F (36.5 °C)-99.1 °F (37.3 °C)] 99.1 °F (37.3 °C)  Pulse:  [70-86] 81  Resp:  [17-22] 18  SpO2:  [90 %-98 %] 96 %  BP: (124-164)/(59-78) 164/74     Weight: 110.5 kg (243 lb 9.7 oz)  Body mass index is 32.14 kg/m².    Intake/Output Summary (Last 24 hours) at 3/2/2023 1602  Last data filed at 3/2/2023 1023  Gross per 24 hour   Intake --   Output 4300 ml   Net -4300 ml      Physical Exam  Clear lungs bilaterally, unlabored breathing, on room air, no cyanosis   Heart sounds indicate a regular rate and rhythm   No obvious lower extremity edema   Sleeping, easily woken there appears to be drowsy, no acute distress  No facial droop, no slurred speech   Follows motor commands   Moves all 4 extremities

## 2023-03-02 NOTE — ASSESSMENT & PLAN NOTE
- management per primary  - currently on hydralazine 50 mg TID, Toprol-XL 25 mg daily, nifedipine 60 mg BID in addition to doxazosin 4 mg Q12H

## 2023-03-02 NOTE — ASSESSMENT & PLAN NOTE
- Initially held home dosing of p.r.n. Lasix given KATINA and did trial of IVF but no improvement in Scr  - nephrology following   - started trial of IV lasix on 2/23 with improvement in Scr. Started on lasix gtt on 2/27. Added IV diuril BID on 2/28 per nephro recs  - switching over from lasix ggt to lasix IV bid

## 2023-03-03 PROBLEM — Z71.89 GOALS OF CARE, COUNSELING/DISCUSSION: Status: RESOLVED | Noted: 2023-03-01 | Resolved: 2023-03-03

## 2023-03-03 LAB
ANION GAP SERPL CALC-SCNC: 13 MMOL/L (ref 8–16)
BUN SERPL-MCNC: 49 MG/DL (ref 8–23)
CALCIUM SERPL-MCNC: 8.5 MG/DL (ref 8.7–10.5)
CHLORIDE SERPL-SCNC: 97 MMOL/L (ref 95–110)
CO2 SERPL-SCNC: 32 MMOL/L (ref 23–29)
CREAT SERPL-MCNC: 4.1 MG/DL (ref 0.5–1.4)
ERYTHROCYTE [DISTWIDTH] IN BLOOD BY AUTOMATED COUNT: 16.1 % (ref 11.5–14.5)
EST. GFR  (NO RACE VARIABLE): 15.1 ML/MIN/1.73 M^2
GLUCOSE SERPL-MCNC: 57 MG/DL (ref 70–110)
HCT VFR BLD AUTO: 28 % (ref 40–54)
HGB BLD-MCNC: 8.4 G/DL (ref 14–18)
MCH RBC QN AUTO: 24.3 PG (ref 27–31)
MCHC RBC AUTO-ENTMCNC: 30 G/DL (ref 32–36)
MCV RBC AUTO: 81 FL (ref 82–98)
PLATELET # BLD AUTO: 279 K/UL (ref 150–450)
PMV BLD AUTO: 10.6 FL (ref 9.2–12.9)
POTASSIUM SERPL-SCNC: 3.7 MMOL/L (ref 3.5–5.1)
RBC # BLD AUTO: 3.45 M/UL (ref 4.6–6.2)
SODIUM SERPL-SCNC: 142 MMOL/L (ref 136–145)
TACROLIMUS BLD-MCNC: 5.4 NG/ML (ref 5–15)
WBC # BLD AUTO: 7.6 K/UL (ref 3.9–12.7)

## 2023-03-03 PROCEDURE — 94640 AIRWAY INHALATION TREATMENT: CPT

## 2023-03-03 PROCEDURE — 25000242 PHARM REV CODE 250 ALT 637 W/ HCPCS: Performed by: INTERNAL MEDICINE

## 2023-03-03 PROCEDURE — 80048 BASIC METABOLIC PNL TOTAL CA: CPT | Performed by: INTERNAL MEDICINE

## 2023-03-03 PROCEDURE — 94761 N-INVAS EAR/PLS OXIMETRY MLT: CPT

## 2023-03-03 PROCEDURE — 12000002 HC ACUTE/MED SURGE SEMI-PRIVATE ROOM

## 2023-03-03 PROCEDURE — 99232 SBSQ HOSP IP/OBS MODERATE 35: CPT | Mod: ,,, | Performed by: HOSPITALIST

## 2023-03-03 PROCEDURE — 25000003 PHARM REV CODE 250: Performed by: HOSPITALIST

## 2023-03-03 PROCEDURE — 99233 SBSQ HOSP IP/OBS HIGH 50: CPT | Mod: GC,,, | Performed by: INTERNAL MEDICINE

## 2023-03-03 PROCEDURE — 63600175 PHARM REV CODE 636 W HCPCS: Performed by: HOSPITALIST

## 2023-03-03 PROCEDURE — 80197 ASSAY OF TACROLIMUS: CPT | Performed by: HOSPITALIST

## 2023-03-03 PROCEDURE — 63600175 PHARM REV CODE 636 W HCPCS: Performed by: INTERNAL MEDICINE

## 2023-03-03 PROCEDURE — 94664 DEMO&/EVAL PT USE INHALER: CPT

## 2023-03-03 PROCEDURE — 85027 COMPLETE CBC AUTOMATED: CPT | Performed by: INTERNAL MEDICINE

## 2023-03-03 PROCEDURE — 99232 PR SUBSEQUENT HOSPITAL CARE,LEVL II: ICD-10-PCS | Mod: ,,, | Performed by: HOSPITALIST

## 2023-03-03 PROCEDURE — 99900035 HC TECH TIME PER 15 MIN (STAT)

## 2023-03-03 PROCEDURE — 99233 PR SUBSEQUENT HOSPITAL CARE,LEVL III: ICD-10-PCS | Mod: GC,,, | Performed by: INTERNAL MEDICINE

## 2023-03-03 PROCEDURE — 25000003 PHARM REV CODE 250: Performed by: INTERNAL MEDICINE

## 2023-03-03 RX ADMIN — ACETAMINOPHEN 1000 MG: 500 TABLET ORAL at 05:03

## 2023-03-03 RX ADMIN — DOXAZOSIN 4 MG: 2 TABLET ORAL at 09:03

## 2023-03-03 RX ADMIN — IPRATROPIUM BROMIDE AND ALBUTEROL SULFATE 3 ML: 2.5; .5 SOLUTION RESPIRATORY (INHALATION) at 08:03

## 2023-03-03 RX ADMIN — ASPIRIN 81 MG: 81 TABLET, COATED ORAL at 10:03

## 2023-03-03 RX ADMIN — PREDNISONE 5 MG: 5 TABLET ORAL at 10:03

## 2023-03-03 RX ADMIN — IPRATROPIUM BROMIDE AND ALBUTEROL SULFATE 3 ML: 2.5; .5 SOLUTION RESPIRATORY (INHALATION) at 12:03

## 2023-03-03 RX ADMIN — CALCIUM ACETATE 1334 MG: 667 CAPSULE ORAL at 05:03

## 2023-03-03 RX ADMIN — IPRATROPIUM BROMIDE AND ALBUTEROL SULFATE 3 ML: 2.5; .5 SOLUTION RESPIRATORY (INHALATION) at 03:03

## 2023-03-03 RX ADMIN — HYDRALAZINE HYDROCHLORIDE 50 MG: 25 TABLET, FILM COATED ORAL at 10:03

## 2023-03-03 RX ADMIN — FUROSEMIDE 80 MG: 10 INJECTION, SOLUTION INTRAMUSCULAR; INTRAVENOUS at 05:03

## 2023-03-03 RX ADMIN — CALCIUM ACETATE 1334 MG: 667 CAPSULE ORAL at 10:03

## 2023-03-03 RX ADMIN — FLUTICASONE FUROATE AND VILANTEROL TRIFENATATE 1 PUFF: 100; 25 POWDER RESPIRATORY (INHALATION) at 08:03

## 2023-03-03 RX ADMIN — DOXAZOSIN 4 MG: 2 TABLET ORAL at 10:03

## 2023-03-03 RX ADMIN — PARICALCITOL 1 MCG: 1 CAPSULE, LIQUID FILLED ORAL at 10:03

## 2023-03-03 RX ADMIN — TRAMADOL HYDROCHLORIDE 25 MG: 50 TABLET, COATED ORAL at 01:03

## 2023-03-03 RX ADMIN — TACROLIMUS 2 MG: 1 CAPSULE ORAL at 05:03

## 2023-03-03 RX ADMIN — APIXABAN 5 MG: 5 TABLET, FILM COATED ORAL at 09:03

## 2023-03-03 RX ADMIN — HYDRALAZINE HYDROCHLORIDE 50 MG: 25 TABLET, FILM COATED ORAL at 05:03

## 2023-03-03 RX ADMIN — ACETAMINOPHEN 1000 MG: 500 TABLET ORAL at 10:03

## 2023-03-03 RX ADMIN — FAMOTIDINE 20 MG: 20 TABLET ORAL at 10:03

## 2023-03-03 RX ADMIN — ACETAMINOPHEN 1000 MG: 500 TABLET ORAL at 09:03

## 2023-03-03 RX ADMIN — TACROLIMUS 2 MG: 1 CAPSULE ORAL at 10:03

## 2023-03-03 RX ADMIN — APIXABAN 5 MG: 5 TABLET, FILM COATED ORAL at 10:03

## 2023-03-03 RX ADMIN — NIFEDIPINE 60 MG: 30 TABLET, FILM COATED, EXTENDED RELEASE ORAL at 10:03

## 2023-03-03 RX ADMIN — METOPROLOL SUCCINATE 25 MG: 25 TABLET, EXTENDED RELEASE ORAL at 10:03

## 2023-03-03 RX ADMIN — GABAPENTIN 100 MG: 100 CAPSULE ORAL at 09:03

## 2023-03-03 RX ADMIN — ATORVASTATIN CALCIUM 40 MG: 20 TABLET, FILM COATED ORAL at 09:03

## 2023-03-03 RX ADMIN — OLANZAPINE 10 MG: 7.5 TABLET, FILM COATED ORAL at 09:03

## 2023-03-03 RX ADMIN — FUROSEMIDE 80 MG: 10 INJECTION, SOLUTION INTRAMUSCULAR; INTRAVENOUS at 06:03

## 2023-03-03 NOTE — PLAN OF CARE
[facility] - Intensive Care ([facility])  Discharge Final Note    Primary Care Provider: Connie Magdaleno MD    Expected Discharge Date: 3/3/2023    Final Discharge Note (most recent)       Final Note - 03/03/23 0948          Final Note    Assessment Type Final Discharge Note (P)      Anticipated Discharge Disposition Home or Self Care (P)         Post-Acute Status    Discharge Delays None known at this time (P)                      Important Message from Medicare  Important Message from Medicare regarding Discharge Appeal Rights: Given to patient/caregiver, Explained to patient/caregiver, Signed/date by patient/caregiver, Other (comments) (verbal: spoke with Shea (spouse))     Date IMM was signed: 03/02/23  Time IMM was signed: [id number]

## 2023-03-03 NOTE — PROGRESS NOTES
[facility] - Intensive Care ([facility]  Kidney Transplant  Progress Note      Reason for Follow-up: Reassessment of Kidney Transplant - 4/12/2005 - Not Followed  (#2) recipient and management of immunosuppression.    Subjective:   History of Present Illness:  67 year old man with past medical history of kidney transplant times 2 first one in 1992 and then again in 2005. Has CKD with baseline creatinine high 2s. Recently admitted earlier this month with COVID. Was admitted with KATINA with cr 3.8 on admit. Last admission when he was admitted with COVID his cr did peak at 3.8 but when he was on 2/10 his cr was 2.6 at baseline. Patient denies any diarrhea after discharge from hospital. Says that he has a hard time urinating but no dysuria, no hematuria , no flank pain , no fevers. Was on keflex for mild cellulitis. Denies NSAID use. This morning patient was wheezing and had some SOB. He was started on IVF NS with no improvement in scr this morning was 3.8    Mr. Phelps is a 67 y.o. year old male who is status post Kidney Transplant - 4/12/2005 - Not Followed  (#2).    His maintenance immunosuppression consists of:   Immunosuppressants (From admission, onward)      Start     Stop Route Frequency Ordered    03/01/23 1800  tacrolimus capsule 2 mg         -- Oral 2 times daily 03/01/23 1151          Interval History: Patient seen and examined this AM. No acute events overnight. Afebrile with pulse ranging from  80-70s bpm. Systolic blood pressures ranging from 150-140s mmHg. He is saturating +93% on room air with documented UOP of 1.45 liters with three unmeasured voids in the last 24 hours. Hamilton catheter removed yesterday afternoon now with condom catheter. Post void residual today with only 181 cc. BUN 49 from 45 and creatinine 4.1 from 3.9. Tacrolimus trough this morning 5.4 (~13 hours).    Past Medical, Surgical, Family, and Social History:   Unchanged from H&P.    Scheduled Meds:   acetaminophen  1,000 mg Oral TID  "   albuterol-ipratropium  3 mL Nebulization Q4H WAKE    apixaban  5 mg Oral BID    aspirin  81 mg Oral Daily    atorvastatin  40 mg Oral QHS    calcium acetate(phosphat bind)  1,334 mg Oral TID WM    doxazosin  4 mg Oral Q12H    epoetin samara (PROCRIT) injection  20,000 Units Subcutaneous Q7 Days    famotidine  20 mg Oral Daily    fluticasone furoate-vilanteroL  1 puff Inhalation Daily    furosemide (LASIX) injection  80 mg Intravenous Q12H    gabapentin  100 mg Oral QHS    hydrALAZINE  50 mg Oral TID    metoprolol succinate  25 mg Oral Daily    NIFEdipine  60 mg Oral BID    OLANZapine  10 mg Oral QHS    paricalcitoL  1 mcg Oral Daily    predniSONE  5 mg Oral Daily    tacrolimus  2 mg Oral BID     Continuous Infusions:      PRN Meds:sodium chloride, sodium chloride, albuterol, albuterol-ipratropium, clonazePAM, melatonin, OLANZapine, traMADoL    Intake/Output - Last 3 Shifts         03/01 0700  03/02 0659 03/02 0700  03/03 0659    P.O.  720    Total Intake(mL/kg)  720 (6.5)    Urine (mL/kg/hr) 5400 (2.1) 1450 (0.5)    Stool 0 0    Total Output 5400 1450    Net -5400 -730          Urine Occurrence  3 x    Stool Occurrence 2 x 1 x           Objective:     Vital Signs (Most Recent):  Temp: 98.7 °F (37.1 °C) (03/03/23 0421)  Pulse: 82 (03/03/23 0421)  Resp: 18 (03/03/23 0421)  BP: (!) 156/71 (03/03/23 0421)  SpO2: (!) 93 % (03/03/23 0421)   Vital Signs (24h Range):  Temp:  [98 °F (36.7 °C)-99.1 °F (37.3 °C)] 98.7 °F (37.1 °C)  Pulse:  [72-87] 82  Resp:  [18-29] 18  SpO2:  [93 %-97 %] 93 %  BP: (143-164)/(64-78) 156/71     Weight: 110.5 kg (243 lb 9.7 oz)  Height: 6' 1" (185.4 cm)  Body mass index is 32.14 kg/m².    Physical Exam  Vitals and nursing note reviewed.   Constitutional:       General: He is awake. He is not in acute distress.     Appearance: He is well-developed. He is obese. He is ill-appearing. He is not diaphoretic.   HENT:      Head: Normocephalic and atraumatic.      Right Ear: External " ear normal.      Left Ear: External ear normal.      Nose: Nose normal.      Mouth/Throat:      Comments: Hoarseness noted.  Eyes:      General: No scleral icterus.        Right eye: No discharge.         Left eye: No discharge.      Extraocular Movements: Extraocular movements intact.      Conjunctiva/sclera: Conjunctivae normal.   Neck:      Vascular: No JVD.   Cardiovascular:      Rate and Rhythm: Normal rate.      Heart sounds: No murmur heard.    No friction rub. No gallop.   Pulmonary:      Effort: Pulmonary effort is normal. No respiratory distress.      Breath sounds: No wheezing, rhonchi or rales.      Comments: Diminished breath sounds bilaterally.  Abdominal:      General: Bowel sounds are normal. There is no distension.      Palpations: Abdomen is soft.      Tenderness: There is no abdominal tenderness.   Genitourinary:     Comments: Condom catheter in place.  Musculoskeletal:      Cervical back: Neck supple.      Right lower leg: No edema.      Left lower leg: No edema.   Skin:     General: Skin is warm and dry.      Coloration: Skin is not jaundiced.   Neurological:      Mental Status: He is alert. Mental status is at baseline.      Cranial Nerves: No cranial nerve deficit.      Motor: Weakness present.   Psychiatric:         Mood and Affect: Mood normal.         Behavior: Behavior normal. Behavior is cooperative.       Laboratory:  CBC:   Recent Labs   Lab 03/01/23 0224 03/02/23  0453 03/03/23  0605   WBC 7.38 6.89 7.60   RBC 3.21* 3.53* 3.45*   HGB 7.9* 8.6* 8.4*   HCT 25.7* 28.5* 28.0*    282 279   MCV 80* 81* 81*   MCH 24.6* 24.4* 24.3*   MCHC 30.7* 30.2* 30.0*       BMP:   Recent Labs   Lab 02/28/23  0440 03/01/23  0224 03/02/23  0453   GLU 84 83 63*    142 141   K 4.2 3.7 3.3*    102 97   CO2 22* 28 33*   BUN 48* 51* 45*   CREATININE 3.7* 3.9* 3.9*   CALCIUM 7.8* 8.1* 8.7       CMP:   Recent Labs   Lab 02/24/23  0819 02/25/23  1402 02/27/23  0051 02/27/23  0246 02/28/23  0440  03/01/23  0224 03/02/23  0453   GLU 95   < > 115*   < > 84 83 63*   CALCIUM 7.2*   < > 7.6*   < > 7.8* 8.1* 8.7   ALBUMIN 2.2*  --  2.2*  --   --   --   --    PROT  --   --  7.3  --   --   --   --       < > 136   < > 140 142 141   K 4.1   < > 4.0   < > 4.2 3.7 3.3*   CO2 19*   < > 22*   < > 22* 28 33*      < > 102   < > 104 102 97   BUN 41*   < > 50*   < > 48* 51* 45*   CREATININE 3.8*   < > 3.9*   < > 3.7* 3.9* 3.9*   ALKPHOS  --   --  78  --   --   --   --    ALT  --   --  20  --   --   --   --    AST  --   --  25  --   --   --   --     < > = values in this interval not displayed.       Recent Labs   Lab 02/27/23  0023 02/27/23  0549 02/27/23  0623   PH 7.490* 7.483* 7.472*   PCO2 31.1* 32.5* 35.9   HCO3 23.7* 24.4 26.2   POCSATURATED 84* 86* 99   BE 0 1 3       Diagnostic Results:  I have reviewed all imagining in the last 24 hours.    Assessment/Plan:     * Acute kidney injury superimposed on CKD  H/O kidney transplant  - trasnsplant kidney 2x, first one was in 1992 and second one was 2005 patient on Prograf 2.5 mg BID and prednisone 5 mg PO daily as outpatient   - KATINA on CKD (baseline creatinine ~2.6-2.8), most consistent with progressive failing kidney transplant  - UA bland without evidence of hydronephrosis of renal graft noted with ultrasound, defer renal biopsy at this time  - continue Lasix 80 mg IV BID for now  - can continue calcium acetate 1,334 mg TIDWM and paricalitol 1 mcg daily for now  - renal diet when not NPO  - daily RFPs & magnesium  - daily weights and strict I/Os (serial bladder scans ordered Q8H to monitor for retention)  - renally dose medications to eGFR  - avoid nephrotoxins as much as possible (i.e. supra-therapeutic vancomycin troughs, NSAIDs, intra-arterial contrast, etc.)    Hypertension  - management per primary  - currently on hydralazine 50 mg TID, Toprol-XL 25 mg daily, nifedipine 60 mg BID in addition to doxazosin 4 mg Q12H    Long-term use of immunosuppressant  medication  - outpatient regimen consists of tacrolimus 2.5 mg BID and prednisone 5 mg daily  - will continue with tacrolimus 2 mg BID and prednisone 5 mg daily  - goal Prograf trough (4-6)  - daily tacrolimus troughs  - continue to monitor for toxicity      Víctor Enamorado MD  Kidney Transplant  [facility] - Intensive Care ([facility]-)

## 2023-03-03 NOTE — MEDICAL/APP STUDENT
Progress Note  Hospital Medicine    Patient Name: Ibrahima Phelps  YOB: 1955    Admit Date: 2/20/2023                     LOS: 11    SUBJECTIVE:     Reason for Admission:  Acute kidney injury superimposed on CKD    HPI:  67-year-old black male transplant kidney patient being admitted for KATINA.  Patient was just discharged from a hospitalization for COVID pneumonia on 02/10/2023.  He also paid in ED visit a few days ago for what could have been a mild cellulitis/folliculitis on his foot for which he is almost done with his Keflex regimen.    Patient was instructed to come to the ED this time by the COVID surveillance nurses due to desaturation down to 88%.  When I talked with the patient and his wife in detail, they affirmed that he was not having any new or worsening shortness of breath since his discharge.  His wife did report that once the patient woke up/sat up his desaturation spontaneously resolved.  Patient denies any steven current shortness of breath or chest pain.  No nausea vomiting or flu fever like feeling.  In my discussion with the ED provider who was noted that the patient was complaining of shortness of breath but there was a concern that this likely was anxiety triggered.  Patient seems to be saturating well on room air here in the ED and chest x-ray which I personally reviewed is clear bilaterally.  Wife reports patient has been compliant with his medications.    The blood work did demonstrate an acutely elevated creatinine of 3.8 whereas his baseline is in the 2s.    Hospital Course:  67-year-old renal transplant black male was admitted for acute on chronic kidney injury.  Patient was just discharged from hospitalization for COVID pneumonia on 02/10/2023.  Presented to the ED per recommendations of the COVID surveillance team.  Did not have any definitive acute respiratory findings to indicate acute ongoing infectious COVID, chest x-ray was clear.  However creatinine was significantly  elevated up to 3.8 whereas baseline was in the 2s.  Was started on IV fluids with trivial improvement.  CT chest was obtained due to his persistent unchanged severe dyspnea which showed some small bilateral effusions, not enough to drain via thoracentesis.  Patient was started on diuresis with improvement in shortness of breath.  Creatinine seemed to plateau around 3.9.  Patient did get delirious and was started on Depakote.    Interval history: NAEON, patient and the wife both say he did well overnight, no issues. Report to be voiding well.    Review of Systems:  Review of Systems   Constitutional:  Negative for activity change (improved ambulation, holding up their own weight with minimal support), appetite change, fatigue and fever.   Respiratory:  Negative for apnea, cough, chest tightness, shortness of breath and wheezing.    Cardiovascular:  Negative for chest pain, palpitations, leg swelling and claudication.   Gastrointestinal:  Negative for abdominal distention, abdominal pain, constipation, diarrhea, nausea and vomiting.   Genitourinary:  Negative for decreased urine volume, difficulty urinating and dysuria.   Musculoskeletal:  Negative for joint swelling (did not mention anything about this during the disucssion).   Neurological:  Negative for dizziness, light-headedness and headaches.   Psychiatric/Behavioral:  Negative for agitation, behavioral problems, confusion, decreased concentration and hallucinations. The patient is not nervous/anxious.      OBJECTIVE:     Vital Signs (Most Recent):  Temp: 98.1 °F (36.7 °C)  Pulse: 80  Resp: 18  BP: 128/73  SpO2: 95 %    Vital Signs (24h Range):  Temp:  [98 °F (36.7 °C)-99.1 °F (37.3 °C)] 98.1 °F (36.7 °C)  Pulse:  [72-87] 80  Resp:  [18-29] 18  SpO2:  [93 %-97 %] 95 %  BP: (128-164)/(70-78) 128/73     Weight: 110.5 kg (243 lb 9.7 oz)   Body mass index is 32.14 kg/m².    I & O (Last 24H):  Intake/Output Summary (Last 24 hours) at 3/3/2023 1085  Last data filed at  3/3/2023 0622  Gross per 24 hour   Intake 720 ml   Output 1450 ml   Net -730 ml       Physical Exam:  Physical Exam  Vitals reviewed.   Constitutional:       General: He is awake.      Appearance: He is obese.   HENT:      Head: Normocephalic.      Nose: Nose normal.      Mouth/Throat:      Mouth: Mucous membranes are moist.   Eyes:      Extraocular Movements: Extraocular movements intact.      Conjunctiva/sclera: Conjunctivae normal.      Pupils: Pupils are equal, round, and reactive to light.   Cardiovascular:      Rate and Rhythm: Normal rate. Rhythm irregular.      Heart sounds: Murmur heard.   Pulmonary:      Effort: Pulmonary effort is normal.      Breath sounds: Normal breath sounds.   Abdominal:      General: Abdomen is flat.      Palpations: Abdomen is soft.   Musculoskeletal:      Cervical back: Normal range of motion.   Neurological:      General: No focal deficit present.      Mental Status: He is alert and oriented to person, place, and time.   Psychiatric:         Mood and Affect: Mood normal.         Behavior: Behavior normal. Behavior is cooperative.         Thought Content: Thought content normal.         Judgment: Judgment normal.       Significant Labs:    CBC  Recent Labs   Lab 03/01/23  0224 03/02/23  0453 03/03/23  0605   WBC 7.38 6.89 7.60   HGB 7.9* 8.6* 8.4*   HCT 25.7* 28.5* 28.0*    282 279       CMP  Recent Labs   Lab 02/24/23  0819 02/25/23  1402 02/26/23  0532 02/27/23  0051 02/27/23  0246 02/28/23  0440 03/01/23  0224 03/02/23  0453 03/03/23  0605      < > 135* 136 138   < > 142 141 142   K 4.1   < > 4.1 4.0 4.7   < > 3.7 3.3* 3.7      < > 104 102 104   < > 102 97 97   CO2 19*   < > 18* 22* 22*   < > 28 33* 32*   ANIONGAP 12   < > 13 12 12   < > 12 11 13   BUN 41*   < > 48* 50* 46*   < > 51* 45* 49*   CREATININE 3.8*   < > 3.7* 3.9* 3.8*   < > 3.9* 3.9* 4.1*   GLU 95   < > 80 115* 89   < > 83 63* 57*   CALCIUM 7.2*   < > 7.3* 7.6* 7.7*   < > 8.1* 8.7 8.5*   MG  --     < > 2.3 2.3 2.5  --   --   --   --    PHOS 6.4*   < > 6.2* 5.9* 6.2*  --   --   --   --    ALKPHOS  --   --   --  78  --   --   --   --   --    ALT  --   --   --  20  --   --   --   --   --    AST  --   --   --  25  --   --   --   --   --    ALBUMIN 2.2*  --   --  2.2*  --   --   --   --   --    PROT  --   --   --  7.3  --   --   --   --   --    BILITOT  --   --   --  0.4  --   --   --   --   --     < > = values in this interval not displayed.      Lab Results   Component Value Date    INR 1.2 02/07/2023    INR 1.1 03/21/2022    INR 1.2 06/17/2020    INR 1.2 06/17/2020     Lab Results   Component Value Date    HGBA1C 5.5 01/04/2023     Recent Labs     03/02/23  0750   POCTGLUCOSE 79       No results for input(s): LACTATE, CRP, SEDRATE, PROCAL in the last 72 hours.     No results for input(s): CPK, CPKMB, TROPONINI, MB in the last 24 hours.     Urinalysis  Recent Labs   Lab 03/02/23  1655   COLORU Colorless*   SPECGRAV 1.010   PHUR 7.0   PROTEINUA Trace*   NITRITE Negative   LEUKOCYTESUR Trace*        Microbiology  Microbiology Results (last 7 days)       Procedure Component Value Units Date/Time    Clostridium difficile EIA [[id number]] Collected: 03/02/23 1345    Order Status: Completed Specimen: Stool Updated: 03/02/23 2123     C. diff Antigen Negative     C difficile Toxins A+B, EIA Negative     Comment: Testing not recommended for children <24 months old.       Respiratory Infection Panel (PCR), Nasopharyngeal [[id number]]     Order Status: No result Specimen: Nasopharyngeal Swab              Significant Imaging:  Imaging  Imaging Results              X-Ray Chest AP Portable (Final result)  Result time 02/20/23 12:35:47      Final result by Bryon Nguyen MD (02/20/23 12:35:47)                   Impression:      See above      Electronically signed by: Bryon Nguyen MD  Date:    02/20/2023  Time:    12:35               Narrative:    EXAMINATION:  XR CHEST AP PORTABLE    CLINICAL  HISTORY:  COVID-19;    TECHNIQUE:  Single frontal view of the chest was performed.    COMPARISON:  No 02/07/2023 ne    FINDINGS:  Heart size normal.  No significant airspace consolidation or pleural effusion identified                                       ASSESSMENT/PLAN:     Mr. Ibrahima Phelps is a 67 y.o. male with a kidney transplant currently on immunosuppressants and CKD4, HTN, CAD, pAF, HFpEF, anemia of renal disease, recently discharge 2/2 COVID pneumonia 02/10. Presented with oxygen desaturation to 88%, but was not having worsened dyspnea or SOB.    1. Long-term use of immunosuppressant medication    2. SOB (shortness of breath)    3. COVID-19    4. Atrial fibrillation    5. H/O kidney transplant    6. Hypoxia    7. Acute kidney injury superimposed on CKD    8. Shortness of breath    9. Palliative care encounter    10. Goals of care, counseling/discussion    11. Advance care planning    12. Debility        # Acute kidney injury superimposed on CKD  Mr. Phelps is a 66 yo man with a kidney transplant currently on immunosuppressants and CKD4, HTN, CAD, pAF, HFpEF, anemia of renal disease, recently discharge 2/2 COVID pneumonia 02/10. Presented with oxygen desaturation to 88%, but was not having worsened dyspnea or SOB.  - recent labs show elevated BUN and Cr (48/3.7 both consistent with previous CMP), average 2.8  - anemia of renal disease evident H/H 7.3/24.1 --> improvements see 02-MAR 8.6/25.7  - After discussion with kidney transplant, aim to hav 3-4 L UOP within 24 hours, increase Lasix drip to 15 mg/h and add 500 mg BID chlorothiazide. Potentially push the kidney if this is due to fluid overload; otherwise, patient might be looking at dialysis  - UOP 01-Mar 5.9 L on 15 mg/h Lasix and 500 mg BID Diuril --> Diuril d/c and Lasix dec to 10mg/h  - UOP 02-MAR 5.4L on 10mg/h lasix still above out goal output, but no change to Cr between 01 and 02 MAR, maybe maintain.  - Switched From IV to Injection or PO meds,  including 80mg Lasix injection BID --> UOP 02-03MAR 1,450mL  - Seen by palliative care, GOC were had, patient has stated he does not want to be on dialysis. Palliative will be continuing to follow  - Cr rise noted 3.9 to 4.1 (03MAR) inc of 5.1%    PLAN:  [  ] continue IVF PRN  [  ] daily BMP  [  ] continue home immunosuppressant meds  [  ] Epoetin samara injection 20,000 U Q7d  [  ] Lasix Injection 80mg BID  [  ] Palliative and Kidney transplant following  [  ] starting Dispo plans - Patient and wife prefer Home Health, patient is off IV meds, possibly ready for discharge    # Delirium  - RESOLVED, no issues overnight 02-03MAR    # Fall overnight  - No issues, besides the one event  - notes indicate the patient had an unwitnessed fall. Both the patient and his wife say it was not a fall, he got tangled in the wires when trying to get out of bed.  - CT Head non con ordered - NORMAL    # hypoxic event overnight  - No issues at the moment, patient on Room Air    - patient oxygen sat dropped into the 60s, placed on breathing treatment per RR, Sat O2 80s (26-27 Feb)  - suspected fluid overload to be the cause of event, CXR showing decreased R hemidiaphragm angle, possible pleural effusion  - 27 Feb ABG AM pH 7.472, pCO2 35.9, PO2 118, pHCo3 26.2 (improved compared to yesterday 7.483/32.5/47/24.4)  - CT Abdomen/Pelvis shows bilateral pleural effusion    # H/o Kidney transplant  - see KATINA on CKD  - Consulted palliative care - patient wishes for no dialysis    # Wheezing  - albuterol PRN    # anemia of renal disease  - see KATINA on CKD  - H/H improving    # HFpEF  - daily weight, I/Os, continue home metoprolol  - 80mg BID Lasix injection  - CT Abdomen/Pelvis shows bilateral pleural effusion    # pAF  - home meds eliquis, metoprolol    # CAD  - home statin and aspirin, metoprolol    # HTN  - continue metoprolol, nifedipine, and hydralazine    # DVT Ppx  - Eliquis 5 mg restarted following normal (no bleeds) CT Abdomen and  pelvis      VTE Risk Mitigation (From admission, onward)           Ordered     apixaban tablet 5 mg  2 times daily         02/28/23 1904     Place sequential compression device  Until discontinued         02/27/23 1212                    DISCHARGE PLANNING:  Discharge Planning   RAMU: 3/2/2023     Code Status: Full Code   Is the patient medically ready for discharge?: No    Reason for patient still in hospital (select all that apply): Consult recommendations, PT / OT recommendations, and Pending disposition  Discharge Plan A: Home with family          Signing Physician:  SATYA Breen  [facility] R

## 2023-03-03 NOTE — ASSESSMENT & PLAN NOTE
- outpatient regimen consists of tacrolimus 2.5 mg BID and prednisone 5 mg daily  - will continue with tacrolimus 2 mg BID and prednisone 5 mg daily  - goal Prograf trough (4-6)  - daily tacrolimus troughs  - continue to monitor for toxicity

## 2023-03-03 NOTE — SUBJECTIVE & OBJECTIVE
Interval History:  Patient has been successfully urinating spontaneously but concern for an accurate output is there.  Decreased output charted compared to yesterday since the patient was transitioned from drip to IV b.i.d. Lasix.  Per the wife patient is more awake and alert today than yesterday since his Depakote was discontinued and was switched over to Zyprexa.  Patient self denies any steven shortness of breath chest pain.  Creatinine slightly climbed up to 4 today.    Review of Systems  Objective:     Vital Signs (Most Recent):  Temp: 98.6 °F (37 °C) (03/03/23 1133)  Pulse: 81 (03/03/23 1234)  Resp: 16 (03/03/23 1324)  BP: (!) 147/72 (03/03/23 1133)  SpO2: 96 % (03/03/23 1213)   Vital Signs (24h Range):  Temp:  [98.1 °F (36.7 °C)-99.1 °F (37.3 °C)] 98.6 °F (37 °C)  Pulse:  [74-87] 81  Resp:  [16-29] 16  SpO2:  [93 %-97 %] 96 %  BP: (128-164)/(70-74) 147/72     Weight: 110.5 kg (243 lb 9.7 oz)  Body mass index is 32.14 kg/m².    Intake/Output Summary (Last 24 hours) at 3/3/2023 1445  Last data filed at 3/3/2023 0622  Gross per 24 hour   Intake 480 ml   Output 550 ml   Net -70 ml      Physical Exam    Condom catheter in place   Clear lungs bilaterally, unlabored breathing, room air, no cyanosis   Heart sounds indicate a regular rate and rhythm   No obvious lower extremity edema   A bit drowsy but less so than yesterday  Following on motor commands  5/5 proximal upper and lower extremity strength bilaterally including fist  and hip flexor strength   No facial droop, no slurred speech   Pupils equal bilaterally   Oriented x3

## 2023-03-03 NOTE — ASSESSMENT & PLAN NOTE
- acute element resolved  - IV lasix push -- > lasix gtt on 2/27. Added IV diuril BID on 2/28 per nephro recs -- > back to IV lasix BID push now  - euvolemic; acute element resolved

## 2023-03-03 NOTE — SUBJECTIVE & OBJECTIVE
Subjective:   History of Present Illness:  67 year old man with past medical history of kidney transplant times 2 first one in 1992 and then again in 2005. Has CKD with baseline creatinine high 2s. Recently admitted earlier this month with COVID. Was admitted with KATINA with cr 3.8 on admit. Last admission when he was admitted with COVID his cr did peak at 3.8 but when he was on 2/10 his cr was 2.6 at baseline. Patient denies any diarrhea after discharge from hospital. Says that he has a hard time urinating but no dysuria, no hematuria , no flank pain , no fevers. Was on keflex for mild cellulitis. Denies NSAID use. This morning patient was wheezing and had some SOB. He was started on IVF NS with no improvement in scr this morning was 3.8    Mr. Phelps is a 67 y.o. year old male who is status post Kidney Transplant - 4/12/2005 - Not Followed  (#2).    His maintenance immunosuppression consists of:   Immunosuppressants (From admission, onward)      Start     Stop Route Frequency Ordered    03/01/23 1800  tacrolimus capsule 2 mg         -- Oral 2 times daily 03/01/23 1151          Interval History: Patient seen and examined this AM. No acute events overnight. Afebrile with pulse ranging from  80-70s bpm. Systolic blood pressures ranging from 150-140s mmHg. He is saturating +93% on room air with documented UOP of 1.45 liters with three unmeasured voids in the last 24 hours. Hamilton catheter removed yesterday afternoon now with condom catheter. Post void residual today with only 181 cc. BUN 49 from 45 and creatinine 4.1 from 3.9. Tacrolimus trough this morning 5.4 (~13 hours).    Past Medical, Surgical, Family, and Social History:   Unchanged from H&P.    Scheduled Meds:   acetaminophen  1,000 mg Oral TID    albuterol-ipratropium  3 mL Nebulization Q4H WAKE    apixaban  5 mg Oral BID    aspirin  81 mg Oral Daily    atorvastatin  40 mg Oral QHS    calcium acetate(phosphat bind)  1,334 mg Oral TID WM    doxazosin  4 mg Oral  "Q12H    epoetin samara (PROCRIT) injection  20,000 Units Subcutaneous Q7 Days    famotidine  20 mg Oral Daily    fluticasone furoate-vilanteroL  1 puff Inhalation Daily    furosemide (LASIX) injection  80 mg Intravenous Q12H    gabapentin  100 mg Oral QHS    hydrALAZINE  50 mg Oral TID    metoprolol succinate  25 mg Oral Daily    NIFEdipine  60 mg Oral BID    OLANZapine  10 mg Oral QHS    paricalcitoL  1 mcg Oral Daily    predniSONE  5 mg Oral Daily    tacrolimus  2 mg Oral BID     Continuous Infusions:      PRN Meds:sodium chloride, sodium chloride, albuterol, albuterol-ipratropium, clonazePAM, melatonin, OLANZapine, traMADoL    Intake/Output - Last 3 Shifts         03/01 0700  03/02 0659 03/02 0700  03/03 0659    P.O.  720    Total Intake(mL/kg)  720 (6.5)    Urine (mL/kg/hr) 5400 (2.1) 1450 (0.5)    Stool 0 0    Total Output 5400 1450    Net -5400 -730          Urine Occurrence  3 x    Stool Occurrence 2 x 1 x           Objective:     Vital Signs (Most Recent):  Temp: 98.7 °F (37.1 °C) (03/03/23 0421)  Pulse: 82 (03/03/23 0421)  Resp: 18 (03/03/23 0421)  BP: (!) 156/71 (03/03/23 0421)  SpO2: (!) 93 % (03/03/23 0421)   Vital Signs (24h Range):  Temp:  [98 °F (36.7 °C)-99.1 °F (37.3 °C)] 98.7 °F (37.1 °C)  Pulse:  [72-87] 82  Resp:  [18-29] 18  SpO2:  [93 %-97 %] 93 %  BP: (143-164)/(64-78) 156/71     Weight: 110.5 kg (243 lb 9.7 oz)  Height: 6' 1" (185.4 cm)  Body mass index is 32.14 kg/m².    Physical Exam  Vitals and nursing note reviewed.   Constitutional:       General: He is awake. He is not in acute distress.     Appearance: He is well-developed. He is obese. He is ill-appearing. He is not diaphoretic.   HENT:      Head: Normocephalic and atraumatic.      Right Ear: External ear normal.      Left Ear: External ear normal.      Nose: Nose normal.      Mouth/Throat:      Comments: Hoarseness noted.  Eyes:      General: No scleral icterus.        Right eye: No discharge.         Left eye: No discharge.      " Extraocular Movements: Extraocular movements intact.      Conjunctiva/sclera: Conjunctivae normal.   Neck:      Vascular: No JVD.   Cardiovascular:      Rate and Rhythm: Normal rate.      Heart sounds: No murmur heard.    No friction rub. No gallop.   Pulmonary:      Effort: Pulmonary effort is normal. No respiratory distress.      Breath sounds: No wheezing, rhonchi or rales.      Comments: Diminished breath sounds bilaterally.  Abdominal:      General: Bowel sounds are normal. There is no distension.      Palpations: Abdomen is soft.      Tenderness: There is no abdominal tenderness.   Genitourinary:     Comments: Condom catheter in place.  Musculoskeletal:      Cervical back: Neck supple.      Right lower leg: No edema.      Left lower leg: No edema.   Skin:     General: Skin is warm and dry.      Coloration: Skin is not jaundiced.   Neurological:      Mental Status: He is alert. Mental status is at baseline.      Cranial Nerves: No cranial nerve deficit.      Motor: Weakness present.   Psychiatric:         Mood and Affect: Mood normal.         Behavior: Behavior normal. Behavior is cooperative.       Laboratory:  CBC:   Recent Labs   Lab 03/01/23 0224 03/02/23  0453 03/03/23  0605   WBC 7.38 6.89 7.60   RBC 3.21* 3.53* 3.45*   HGB 7.9* 8.6* 8.4*   HCT 25.7* 28.5* 28.0*    282 279   MCV 80* 81* 81*   MCH 24.6* 24.4* 24.3*   MCHC 30.7* 30.2* 30.0*       BMP:   Recent Labs   Lab 02/28/23  0440 03/01/23  0224 03/02/23  0453   GLU 84 83 63*    142 141   K 4.2 3.7 3.3*    102 97   CO2 22* 28 33*   BUN 48* 51* 45*   CREATININE 3.7* 3.9* 3.9*   CALCIUM 7.8* 8.1* 8.7       CMP:   Recent Labs   Lab 02/24/23  0819 02/25/23  1402 02/27/23  0051 02/27/23  0246 02/28/23  0440 03/01/23  0224 03/02/23  0453   GLU 95   < > 115*   < > 84 83 63*   CALCIUM 7.2*   < > 7.6*   < > 7.8* 8.1* 8.7   ALBUMIN 2.2*  --  2.2*  --   --   --   --    PROT  --   --  7.3  --   --   --   --       < > 136   < > 140 142 141    K 4.1   < > 4.0   < > 4.2 3.7 3.3*   CO2 19*   < > 22*   < > 22* 28 33*      < > 102   < > 104 102 97   BUN 41*   < > 50*   < > 48* 51* 45*   CREATININE 3.8*   < > 3.9*   < > 3.7* 3.9* 3.9*   ALKPHOS  --   --  78  --   --   --   --    ALT  --   --  20  --   --   --   --    AST  --   --  25  --   --   --   --     < > = values in this interval not displayed.       Recent Labs   Lab 02/27/23  0023 02/27/23  0549 02/27/23  0623   PH 7.490* 7.483* 7.472*   PCO2 31.1* 32.5* 35.9   HCO3 23.7* 24.4 26.2   POCSATURATED 84* 86* 99   BE 0 1 3       Diagnostic Results:  I have reviewed all imagining in the last 24 hours.

## 2023-03-03 NOTE — ASSESSMENT & PLAN NOTE
Baseline seems to be a creatinine in the 2s   Cr still elevated unchanged at 3.8 despite IVF. Scr increased to 4.0 --> 3.8  US no overly revealing; Transplant working up rejection  - maintain urinary output but creatinine seems to be plateauing now at 3.8-4.0.  Patient affirms he does not want dialysis.    -IV lasix BID per transplant nephro for another day

## 2023-03-03 NOTE — PLAN OF CARE
Problem: Adult Inpatient Plan of Care  Goal: Plan of Care Review  Outcome: Ongoing, Progressing     Problem: Oral Intake Inadequate (Acute Kidney Injury/Impairment)  Goal: Optimal Nutrition Intake  Outcome: Ongoing, Progressing     Problem: Fall Injury Risk  Goal: Absence of Fall and Fall-Related Injury  Outcome: Ongoing, Progressing     Problem: Fluid and Electrolyte Imbalance (Acute Kidney Injury/Impairment)  Goal: Fluid and Electrolyte Balance  Outcome: Ongoing, Progressing    Will continue to monitor pt.

## 2023-03-04 LAB
ANION GAP SERPL CALC-SCNC: 10 MMOL/L (ref 8–16)
BILIRUB UR QL STRIP: NEGATIVE
BUN SERPL-MCNC: 50 MG/DL (ref 8–23)
CALCIUM SERPL-MCNC: 8.5 MG/DL (ref 8.7–10.5)
CHLORIDE SERPL-SCNC: 97 MMOL/L (ref 95–110)
CLARITY UR REFRACT.AUTO: CLEAR
CO2 SERPL-SCNC: 32 MMOL/L (ref 23–29)
COLOR UR AUTO: COLORLESS
CREAT SERPL-MCNC: 4.2 MG/DL (ref 0.5–1.4)
ERYTHROCYTE [DISTWIDTH] IN BLOOD BY AUTOMATED COUNT: 16.4 % (ref 11.5–14.5)
EST. GFR  (NO RACE VARIABLE): 14.6 ML/MIN/1.73 M^2
GLUCOSE SERPL-MCNC: 105 MG/DL (ref 70–110)
GLUCOSE UR QL STRIP: NEGATIVE
HCT VFR BLD AUTO: 29.1 % (ref 40–54)
HGB BLD-MCNC: 8.7 G/DL (ref 14–18)
HGB UR QL STRIP: NEGATIVE
KETONES UR QL STRIP: NEGATIVE
LEUKOCYTE ESTERASE UR QL STRIP: NEGATIVE
MAGNESIUM SERPL-MCNC: 2 MG/DL (ref 1.6–2.6)
MCH RBC QN AUTO: 24.6 PG (ref 27–31)
MCHC RBC AUTO-ENTMCNC: 29.9 G/DL (ref 32–36)
MCV RBC AUTO: 82 FL (ref 82–98)
NITRITE UR QL STRIP: NEGATIVE
PH UR STRIP: 7 [PH] (ref 5–8)
PLATELET # BLD AUTO: 288 K/UL (ref 150–450)
PMV BLD AUTO: 10.7 FL (ref 9.2–12.9)
POTASSIUM SERPL-SCNC: 3.2 MMOL/L (ref 3.5–5.1)
PROT UR QL STRIP: ABNORMAL
RBC # BLD AUTO: 3.53 M/UL (ref 4.6–6.2)
SODIUM SERPL-SCNC: 139 MMOL/L (ref 136–145)
SP GR UR STRIP: 1.01 (ref 1–1.03)
TACROLIMUS BLD-MCNC: 5.1 NG/ML (ref 5–15)
URN SPEC COLLECT METH UR: ABNORMAL
WBC # BLD AUTO: 8.4 K/UL (ref 3.9–12.7)

## 2023-03-04 PROCEDURE — 99900035 HC TECH TIME PER 15 MIN (STAT)

## 2023-03-04 PROCEDURE — 63600175 PHARM REV CODE 636 W HCPCS: Performed by: INTERNAL MEDICINE

## 2023-03-04 PROCEDURE — 63600175 PHARM REV CODE 636 W HCPCS: Performed by: HOSPITALIST

## 2023-03-04 PROCEDURE — 12000002 HC ACUTE/MED SURGE SEMI-PRIVATE ROOM

## 2023-03-04 PROCEDURE — 85027 COMPLETE CBC AUTOMATED: CPT | Performed by: INTERNAL MEDICINE

## 2023-03-04 PROCEDURE — 93010 ELECTROCARDIOGRAM REPORT: CPT | Mod: ,,, | Performed by: INTERNAL MEDICINE

## 2023-03-04 PROCEDURE — 25000003 PHARM REV CODE 250: Performed by: STUDENT IN AN ORGANIZED HEALTH CARE EDUCATION/TRAINING PROGRAM

## 2023-03-04 PROCEDURE — 25000003 PHARM REV CODE 250: Performed by: HOSPITALIST

## 2023-03-04 PROCEDURE — 25000003 PHARM REV CODE 250: Performed by: INTERNAL MEDICINE

## 2023-03-04 PROCEDURE — 94640 AIRWAY INHALATION TREATMENT: CPT

## 2023-03-04 PROCEDURE — 94761 N-INVAS EAR/PLS OXIMETRY MLT: CPT

## 2023-03-04 PROCEDURE — 36415 COLL VENOUS BLD VENIPUNCTURE: CPT | Performed by: HOSPITALIST

## 2023-03-04 PROCEDURE — 93010 EKG 12-LEAD: ICD-10-PCS | Mod: ,,, | Performed by: INTERNAL MEDICINE

## 2023-03-04 PROCEDURE — 27000646 HC AEROBIKA DEVICE

## 2023-03-04 PROCEDURE — 80048 BASIC METABOLIC PNL TOTAL CA: CPT | Performed by: INTERNAL MEDICINE

## 2023-03-04 PROCEDURE — 81003 URINALYSIS AUTO W/O SCOPE: CPT | Performed by: HOSPITALIST

## 2023-03-04 PROCEDURE — 51702 INSERT TEMP BLADDER CATH: CPT

## 2023-03-04 PROCEDURE — 94664 DEMO&/EVAL PT USE INHALER: CPT

## 2023-03-04 PROCEDURE — 99233 SBSQ HOSP IP/OBS HIGH 50: CPT | Mod: ,,, | Performed by: HOSPITALIST

## 2023-03-04 PROCEDURE — 93005 ELECTROCARDIOGRAM TRACING: CPT

## 2023-03-04 PROCEDURE — 94668 MNPJ CHEST WALL SBSQ: CPT

## 2023-03-04 PROCEDURE — 80197 ASSAY OF TACROLIMUS: CPT | Performed by: HOSPITALIST

## 2023-03-04 PROCEDURE — 99233 PR SUBSEQUENT HOSPITAL CARE,LEVL III: ICD-10-PCS | Mod: ,,, | Performed by: HOSPITALIST

## 2023-03-04 PROCEDURE — 83735 ASSAY OF MAGNESIUM: CPT | Performed by: INTERNAL MEDICINE

## 2023-03-04 PROCEDURE — 25000242 PHARM REV CODE 250 ALT 637 W/ HCPCS: Performed by: INTERNAL MEDICINE

## 2023-03-04 RX ORDER — LORAZEPAM 2 MG/ML
2 INJECTION INTRAMUSCULAR ONCE
Status: COMPLETED | OUTPATIENT
Start: 2023-03-04 | End: 2023-03-04

## 2023-03-04 RX ORDER — POTASSIUM CHLORIDE 20 MEQ/1
40 TABLET, EXTENDED RELEASE ORAL ONCE
Status: COMPLETED | OUTPATIENT
Start: 2023-03-04 | End: 2023-03-04

## 2023-03-04 RX ADMIN — ACETAMINOPHEN 1000 MG: 500 TABLET ORAL at 05:03

## 2023-03-04 RX ADMIN — ATORVASTATIN CALCIUM 40 MG: 20 TABLET, FILM COATED ORAL at 08:03

## 2023-03-04 RX ADMIN — LORAZEPAM 2 MG: 2 INJECTION INTRAMUSCULAR; INTRAVENOUS at 01:03

## 2023-03-04 RX ADMIN — OLANZAPINE 10 MG: 7.5 TABLET, FILM COATED ORAL at 08:03

## 2023-03-04 RX ADMIN — PARICALCITOL 1 MCG: 1 CAPSULE, LIQUID FILLED ORAL at 09:03

## 2023-03-04 RX ADMIN — METOPROLOL SUCCINATE 25 MG: 25 TABLET, EXTENDED RELEASE ORAL at 09:03

## 2023-03-04 RX ADMIN — HYDRALAZINE HYDROCHLORIDE 50 MG: 25 TABLET, FILM COATED ORAL at 09:03

## 2023-03-04 RX ADMIN — CALCIUM ACETATE 1334 MG: 667 CAPSULE ORAL at 05:03

## 2023-03-04 RX ADMIN — APIXABAN 5 MG: 5 TABLET, FILM COATED ORAL at 08:03

## 2023-03-04 RX ADMIN — IPRATROPIUM BROMIDE AND ALBUTEROL SULFATE 3 ML: 2.5; .5 SOLUTION RESPIRATORY (INHALATION) at 05:03

## 2023-03-04 RX ADMIN — NIFEDIPINE 60 MG: 30 TABLET, FILM COATED, EXTENDED RELEASE ORAL at 08:03

## 2023-03-04 RX ADMIN — TACROLIMUS 2 MG: 1 CAPSULE ORAL at 09:03

## 2023-03-04 RX ADMIN — DOXAZOSIN 4 MG: 2 TABLET ORAL at 09:03

## 2023-03-04 RX ADMIN — FUROSEMIDE 80 MG: 10 INJECTION, SOLUTION INTRAMUSCULAR; INTRAVENOUS at 06:03

## 2023-03-04 RX ADMIN — ACETAMINOPHEN 1000 MG: 500 TABLET ORAL at 08:03

## 2023-03-04 RX ADMIN — PREDNISONE 5 MG: 5 TABLET ORAL at 09:03

## 2023-03-04 RX ADMIN — POTASSIUM CHLORIDE 40 MEQ: 1500 TABLET, EXTENDED RELEASE ORAL at 05:03

## 2023-03-04 RX ADMIN — HYDRALAZINE HYDROCHLORIDE 50 MG: 25 TABLET, FILM COATED ORAL at 08:03

## 2023-03-04 RX ADMIN — GABAPENTIN 100 MG: 100 CAPSULE ORAL at 08:03

## 2023-03-04 RX ADMIN — HYDRALAZINE HYDROCHLORIDE 50 MG: 25 TABLET, FILM COATED ORAL at 05:03

## 2023-03-04 RX ADMIN — NIFEDIPINE 60 MG: 30 TABLET, FILM COATED, EXTENDED RELEASE ORAL at 09:03

## 2023-03-04 RX ADMIN — DOXAZOSIN 4 MG: 2 TABLET ORAL at 08:03

## 2023-03-04 RX ADMIN — ASPIRIN 81 MG: 81 TABLET, COATED ORAL at 09:03

## 2023-03-04 RX ADMIN — Medication 6 MG: at 08:03

## 2023-03-04 RX ADMIN — FLUTICASONE FUROATE AND VILANTEROL TRIFENATATE 1 PUFF: 100; 25 POWDER RESPIRATORY (INHALATION) at 08:03

## 2023-03-04 RX ADMIN — CALCIUM ACETATE 1334 MG: 667 CAPSULE ORAL at 09:03

## 2023-03-04 RX ADMIN — APIXABAN 5 MG: 5 TABLET, FILM COATED ORAL at 09:03

## 2023-03-04 RX ADMIN — IPRATROPIUM BROMIDE AND ALBUTEROL SULFATE 3 ML: 2.5; .5 SOLUTION RESPIRATORY (INHALATION) at 08:03

## 2023-03-04 RX ADMIN — TACROLIMUS 2 MG: 1 CAPSULE ORAL at 05:03

## 2023-03-04 RX ADMIN — FUROSEMIDE 80 MG: 10 INJECTION, SOLUTION INTRAMUSCULAR; INTRAVENOUS at 05:03

## 2023-03-04 RX ADMIN — FAMOTIDINE 20 MG: 20 TABLET ORAL at 09:03

## 2023-03-04 RX ADMIN — IPRATROPIUM BROMIDE AND ALBUTEROL SULFATE 3 ML: 2.5; .5 SOLUTION RESPIRATORY (INHALATION) at 07:03

## 2023-03-04 NOTE — CLINICAL REVIEW
[facility] Chart Review      Intake/Output Summary (Last 24 hours) at 3/4/2023 1354  Last data filed at 3/4/2023 1340  Gross per 24 hour   Intake --   Output 925 ml   Net -925 ml       Vitals:    03/04/23 0721 03/04/23 0741 03/04/23 0834 03/04/23 1120   BP:  (!) 169/79  (!) 150/95   BP Location:  Left arm  Right arm   Patient Position:  Lying  Lying   Pulse: 69 72 73 79   Resp:  18 16 18   Temp:  98 °F (36.7 °C)  97.6 °F (36.4 °C)   TempSrc:  Oral  Oral   SpO2:  (!) 94% 99% 100%   Weight:       Height:           Recent Labs   Lab 02/26/23  0532 02/27/23  0051 02/27/23  0246 02/28/23  0440 03/02/23  0453 03/03/23  0605 03/04/23  0559   * 136 138   < > 141 142 139   K 4.1 4.0 4.7   < > 3.3* 3.7 3.2*    102 104   < > 97 97 97   CO2 18* 22* 22*   < > 33* 32* 32*   BUN 48* 50* 46*   < > 45* 49* 50*   CREATININE 3.7* 3.9* 3.8*   < > 3.9* 4.1* 4.2*   CALCIUM 7.3* 7.6* 7.7*   < > 8.7 8.5* 8.5*   PHOS 6.2* 5.9* 6.2*  --   --   --   --     < > = values in this interval not displayed.       Immunosuppression Level - 5.1  Continue current IS at current doses  Agree with pearson placement for urinary retention   High risk for dialysis, but patient not amenable and agree with palliative note about dialysis, fortunate no acute indication today    No other related issues identified. Please call [facility] as needed; We will continue to follow.    Jeff Diaz Jr.

## 2023-03-04 NOTE — PROGRESS NOTES
[facility] - Intensive Care ([address] Medicine  Progress Note    Patient Name: Ibrahima Phelps  MRN: [id number]  Patient Class: IP- Inpatient   Admission Date: 2/20/2023  Length of Stay: 12 days  Attending Physician: Russ Vizcaino MD  Primary Care Provider: Connie Magdaleno MD        Subjective:     Principal Problem:Acute kidney injury superimposed on CKD        HPI:  67-year-old black male transplant kidney patient being admitted for KATINA.  Patient was just discharged from a hospitalization for COVID pneumonia on 02/10/2023.  He also paid in ED visit a few days ago for what could have been a mild cellulitis/folliculitis on his foot for which he is almost done with his Keflex regimen.    Patient was instructed to come to the ED this time by the COVID surveillance nurses due to desaturation down to 88%.  When I talked with the patient and his wife in detail, they affirmed that he was not having any new or worsening shortness of breath since his discharge.  His wife did report that once the patient woke up/sat up his desaturation spontaneously resolved.  Patient denies any steven current shortness of breath or chest pain.  No nausea vomiting or flu fever like feeling.  In my discussion with the ED provider who was noted that the patient was complaining of shortness of breath but there was a concern that this likely was anxiety triggered.  Patient seems to be saturating well on room air here in the ED and chest x-ray which I personally reviewed is clear bilaterally.  Wife reports patient has been compliant with his medications.    The blood work did demonstrate an acutely elevated creatinine of 3.8 whereas his baseline is in the 2s.      Overview/Hospital Course:  67-year-old renal transplant black male was admitted for acute on chronic kidney injury.  Patient was just discharged from hospitalization for COVID pneumonia on 02/10/2023.  Presented to the ED per recommendations of the COVID surveillance team.   Did not have any definitive acute respiratory findings to indicate acute ongoing infectious COVID, chest x-ray was clear.  However creatinine was significantly elevated up to 3.8 whereas baseline was in the 2s.  Was started on IV fluids with trivial improvement.  CT chest was obtained due to his persistent unchanged severe dyspnea which showed some small bilateral effusions, not enough to drain via thoracentesis.  Patient was started on diuresis with improvement in shortness of breath.  Creatinine seemed to plateau around 3.9.  Patient did get delirious and was started on Depakote.      Interval History:  Patient denies shortness of breath or chest pain.  Creatinine still climbing, over 600 cc noted on bladder scan per nursing, patient voiding less and less.  Offered the patient that we can stick in a short-term Hamilton catheter that he can be discharged with to minimize any obstructive uropathic causes of his climbing creatinine.    Review of Systems  Objective:     Vital Signs (Most Recent):  Temp: 97.6 °F (36.4 °C) (03/04/23 1120)  Pulse: 79 (03/04/23 1120)  Resp: 18 (03/04/23 1120)  BP: (!) 150/95 (03/04/23 1120)  SpO2: 100 % (03/04/23 1120)   Vital Signs (24h Range):  Temp:  [97.6 °F (36.4 °C)-98.9 °F (37.2 °C)] 97.6 °F (36.4 °C)  Pulse:  [69-88] 79  Resp:  [16-20] 18  SpO2:  [92 %-100 %] 100 %  BP: (132-169)/(58-95) 150/95     Weight: 110.5 kg (243 lb 9.7 oz)  Body mass index is 32.14 kg/m².    Intake/Output Summary (Last 24 hours) at 3/4/2023 1346  Last data filed at 3/4/2023 1340  Gross per 24 hour   Intake --   Output 925 ml   Net -925 ml      Physical Exam    Clear lungs bilaterally, unlabored breathing   Heart sounds indicate a regular rate rhythm   Awake alert, no acute distress   No facial droop, no slurred speech   Abdomen is soft, nontender, nondistended   No obvious lower extremity edema          Assessment/Plan:          * Acute kidney injury superimposed on CKD  Baseline seems to be a creatinine in  "the 2s , time of admit was    3.8-4.0.  Patient affirms he does not want dialysis.    -IV lasix BID per transplant nephro  - Cr climbing again still > 4, suspect post-renal obstructive element given urinarty retention; pearson ordered, anticipate discharge with pearson with voiding trial w/ urology outpt          H/O kidney transplant  K transplant service following  Routine prograf levels  Continue home Prograf 2 mg b.i.d.  Continue home prednisone  - can restart on  BID at the time of discharge      Chronic dyspnea  Acute hypoxic respiratory failure  -Unchanged prior to admission; Don't suspect acute covid  - seems resolved; on RA now  -CT chest w/o contrast showing BL pleural effusions; unable to perform thoracentesis due to insufficient fluid for safe procedure   -improved w/ aggressive diuresis      Advance care planning  - patient expressed that he would not like to pursue dialysis if renal failure progressed   - Palliative care consulted. apprec recs      Palliative care encounter  - see "goals of care discussion and advance care planning"      Delirium  Much improved after being switched from Depakote to Zyprexa    Right ankle pain  -resolved w/ colchicine, likely was gout      Acute on chronic diastolic heart failure  - acute element resolved  - IV lasix push -- > lasix gtt on 2/27. Added IV diuril BID on 2/28 per nephro recs -- > back to IV lasix BID push now  - euvolemic; acute element resolved    Paroxysmal atrial fibrillation  Continue home Eliquis, metoprolol  - EP cardiology consulted, apprec recs  -- holding home  Flecainide due to KATINA        CAD (coronary artery disease)  History of stenting years ago   Continue home aspirin and statin, Toprol      Hypertension  Continue home metoprolol, nifedipine, hydralazine      Long-term use of immunosuppressant medication  - see "kidney transplant"        VTE Risk Mitigation (From admission, onward)         Ordered     apixaban tablet 5 mg  2 times daily    "      02/28/23 1904     Place sequential compression device  Until discontinued         02/27/23 1212                Discharge Planning   RAMU: 3/3/2023     Code Status: Full Code   Is the patient medically ready for discharge?: No    Reason for patient still in hospital (select all that apply): Patient new problem  Discharge Plan A: Home with family   Discharge Delays: None known at this time              Russ Vizcaino MD  Department of Hospital Medicine   [facility] - Intensive Care ([place])

## 2023-03-04 NOTE — PLAN OF CARE
Problem: Adult Inpatient Plan of Care  Goal: Plan of Care Review  Outcome: Ongoing, Progressing     Problem: Oral Intake Inadequate (Acute Kidney Injury/Impairment)  Goal: Optimal Nutrition Intake  Outcome: Ongoing, Progressing     Problem: Fall Injury Risk  Goal: Absence of Fall and Fall-Related Injury  Outcome: Ongoing, Progressing     Problem: Infection  Goal: Absence of Infection Signs and Symptoms  Outcome: Ongoing, Progressing    Will continue to monitor pt.

## 2023-03-04 NOTE — ASSESSMENT & PLAN NOTE
Baseline seems to be a creatinine in the 2s   Cr still elevated unchanged at 3.8 despite IVF. Scr increased to 4.0 --> 3.8  US no overly revealing; Transplant working up rejection  - maintain urinary output but creatinine seems to be plateauing now at 3.8-4.0.  Patient affirms he does not want dialysis.    -IV lasix BID per transplant nephro  - Cr climbing again still, suspect post-renal obstructive element given urinarty retention; pearson ordered, anticipate discharge with pearson with voiding trial w/ urology outpt

## 2023-03-04 NOTE — PROGRESS NOTES
Telemetry tech called primary nurse at 1421 during lunch and notified nurse that pt just had 9 beats run of v-tach. Primary nurse asked another nurse to go assess pt and took vital signs, and primary nurse notified Dr. Vizcaino. Dr. Vizcaino wanted to know if pt currently has defibrillator or not and primary nurse notified Dr. Vizcaino that pt and family stated that pt does not have a defibrillator. Dr. Vizcaino ordered stat EKG and Mag labs and potassium replacement. Will follow-up with orders and continue to monitor pt.

## 2023-03-04 NOTE — SUBJECTIVE & OBJECTIVE
Interval History:  Patient denies shortness of breath or chest pain.  Creatinine still climbing, over 600 cc noted on bladder scan per nursing, patient voiding less and less.  Offered the patient that we can stick in a short-term Hamilton catheter that he can be discharged with to minimize any obstructive uropathic causes of his climbing creatinine.    Review of Systems  Objective:     Vital Signs (Most Recent):  Temp: 97.6 °F (36.4 °C) (03/04/23 1120)  Pulse: 79 (03/04/23 1120)  Resp: 18 (03/04/23 1120)  BP: (!) 150/95 (03/04/23 1120)  SpO2: 100 % (03/04/23 1120)   Vital Signs (24h Range):  Temp:  [97.6 °F (36.4 °C)-98.9 °F (37.2 °C)] 97.6 °F (36.4 °C)  Pulse:  [69-88] 79  Resp:  [16-20] 18  SpO2:  [92 %-100 %] 100 %  BP: (132-169)/(58-95) 150/95     Weight: 110.5 kg (243 lb 9.7 oz)  Body mass index is 32.14 kg/m².    Intake/Output Summary (Last 24 hours) at 3/4/2023 1346  Last data filed at 3/4/2023 1340  Gross per 24 hour   Intake --   Output 925 ml   Net -925 ml      Physical Exam    Clear lungs bilaterally, unlabored breathing   Heart sounds indicate a regular rate rhythm   Awake alert, no acute distress   No facial droop, no slurred speech   Abdomen is soft, nontender, nondistended   No obvious lower extremity edema

## 2023-03-05 LAB
ANION GAP SERPL CALC-SCNC: 11 MMOL/L (ref 8–16)
BUN SERPL-MCNC: 48 MG/DL (ref 8–23)
CALCIUM SERPL-MCNC: 8.5 MG/DL (ref 8.7–10.5)
CHLORIDE SERPL-SCNC: 101 MMOL/L (ref 95–110)
CO2 SERPL-SCNC: 29 MMOL/L (ref 23–29)
CREAT SERPL-MCNC: 4 MG/DL (ref 0.5–1.4)
EST. GFR  (NO RACE VARIABLE): 15.5 ML/MIN/1.73 M^2
GLUCOSE SERPL-MCNC: 71 MG/DL (ref 70–110)
POTASSIUM SERPL-SCNC: 3.8 MMOL/L (ref 3.5–5.1)
SODIUM SERPL-SCNC: 141 MMOL/L (ref 136–145)
TACROLIMUS BLD-MCNC: 4.3 NG/ML (ref 5–15)

## 2023-03-05 PROCEDURE — 94640 AIRWAY INHALATION TREATMENT: CPT

## 2023-03-05 PROCEDURE — 80197 ASSAY OF TACROLIMUS: CPT | Performed by: HOSPITALIST

## 2023-03-05 PROCEDURE — 99900035 HC TECH TIME PER 15 MIN (STAT)

## 2023-03-05 PROCEDURE — 63600175 PHARM REV CODE 636 W HCPCS: Mod: JZ,JG | Performed by: INTERNAL MEDICINE

## 2023-03-05 PROCEDURE — 99232 SBSQ HOSP IP/OBS MODERATE 35: CPT | Mod: ,,, | Performed by: HOSPITALIST

## 2023-03-05 PROCEDURE — 94664 DEMO&/EVAL PT USE INHALER: CPT

## 2023-03-05 PROCEDURE — 25000003 PHARM REV CODE 250: Performed by: HOSPITALIST

## 2023-03-05 PROCEDURE — 25000003 PHARM REV CODE 250: Performed by: INTERNAL MEDICINE

## 2023-03-05 PROCEDURE — 99232 PR SUBSEQUENT HOSPITAL CARE,LEVL II: ICD-10-PCS | Mod: ,,, | Performed by: HOSPITALIST

## 2023-03-05 PROCEDURE — 63600175 PHARM REV CODE 636 W HCPCS: Performed by: INTERNAL MEDICINE

## 2023-03-05 PROCEDURE — 12000002 HC ACUTE/MED SURGE SEMI-PRIVATE ROOM

## 2023-03-05 PROCEDURE — 94668 MNPJ CHEST WALL SBSQ: CPT

## 2023-03-05 PROCEDURE — 80048 BASIC METABOLIC PNL TOTAL CA: CPT | Performed by: HOSPITALIST

## 2023-03-05 PROCEDURE — 25000242 PHARM REV CODE 250 ALT 637 W/ HCPCS: Performed by: INTERNAL MEDICINE

## 2023-03-05 PROCEDURE — 94761 N-INVAS EAR/PLS OXIMETRY MLT: CPT

## 2023-03-05 PROCEDURE — 25000003 PHARM REV CODE 250: Performed by: STUDENT IN AN ORGANIZED HEALTH CARE EDUCATION/TRAINING PROGRAM

## 2023-03-05 PROCEDURE — 63600175 PHARM REV CODE 636 W HCPCS: Performed by: HOSPITALIST

## 2023-03-05 RX ORDER — COLCHICINE 0.6 MG/1
0.6 TABLET, FILM COATED ORAL ONCE
Status: DISCONTINUED | OUTPATIENT
Start: 2023-03-05 | End: 2023-03-05

## 2023-03-05 RX ORDER — FUROSEMIDE 20 MG/1
40 TABLET ORAL 2 TIMES DAILY
Status: DISCONTINUED | OUTPATIENT
Start: 2023-03-05 | End: 2023-03-05

## 2023-03-05 RX ORDER — FUROSEMIDE 20 MG/1
80 TABLET ORAL 2 TIMES DAILY
Status: DISCONTINUED | OUTPATIENT
Start: 2023-03-05 | End: 2023-03-06 | Stop reason: HOSPADM

## 2023-03-05 RX ADMIN — CALCIUM ACETATE 1334 MG: 667 CAPSULE ORAL at 08:03

## 2023-03-05 RX ADMIN — CALCIUM ACETATE 1334 MG: 667 CAPSULE ORAL at 04:03

## 2023-03-05 RX ADMIN — NIFEDIPINE 60 MG: 30 TABLET, FILM COATED, EXTENDED RELEASE ORAL at 09:03

## 2023-03-05 RX ADMIN — METOPROLOL SUCCINATE 25 MG: 25 TABLET, EXTENDED RELEASE ORAL at 09:03

## 2023-03-05 RX ADMIN — ACETAMINOPHEN 1000 MG: 500 TABLET ORAL at 09:03

## 2023-03-05 RX ADMIN — HYDRALAZINE HYDROCHLORIDE 50 MG: 25 TABLET, FILM COATED ORAL at 08:03

## 2023-03-05 RX ADMIN — ACETAMINOPHEN 1000 MG: 500 TABLET ORAL at 08:03

## 2023-03-05 RX ADMIN — FAMOTIDINE 20 MG: 20 TABLET ORAL at 08:03

## 2023-03-05 RX ADMIN — TRAMADOL HYDROCHLORIDE 25 MG: 50 TABLET, COATED ORAL at 06:03

## 2023-03-05 RX ADMIN — IPRATROPIUM BROMIDE AND ALBUTEROL SULFATE 3 ML: 2.5; .5 SOLUTION RESPIRATORY (INHALATION) at 07:03

## 2023-03-05 RX ADMIN — ATORVASTATIN CALCIUM 40 MG: 20 TABLET, FILM COATED ORAL at 09:03

## 2023-03-05 RX ADMIN — CALCIUM ACETATE 1334 MG: 667 CAPSULE ORAL at 12:03

## 2023-03-05 RX ADMIN — APIXABAN 5 MG: 5 TABLET, FILM COATED ORAL at 08:03

## 2023-03-05 RX ADMIN — IPRATROPIUM BROMIDE AND ALBUTEROL SULFATE 3 ML: 2.5; .5 SOLUTION RESPIRATORY (INHALATION) at 11:03

## 2023-03-05 RX ADMIN — DOXAZOSIN 4 MG: 2 TABLET ORAL at 09:03

## 2023-03-05 RX ADMIN — PARICALCITOL 1 MCG: 1 CAPSULE, LIQUID FILLED ORAL at 08:03

## 2023-03-05 RX ADMIN — OLANZAPINE 10 MG: 7.5 TABLET, FILM COATED ORAL at 09:03

## 2023-03-05 RX ADMIN — ASPIRIN 81 MG: 81 TABLET, COATED ORAL at 08:03

## 2023-03-05 RX ADMIN — TACROLIMUS 2 MG: 1 CAPSULE ORAL at 05:03

## 2023-03-05 RX ADMIN — HYDRALAZINE HYDROCHLORIDE 50 MG: 25 TABLET, FILM COATED ORAL at 09:03

## 2023-03-05 RX ADMIN — FUROSEMIDE 80 MG: 20 TABLET ORAL at 05:03

## 2023-03-05 RX ADMIN — IPRATROPIUM BROMIDE AND ALBUTEROL SULFATE 3 ML: 2.5; .5 SOLUTION RESPIRATORY (INHALATION) at 03:03

## 2023-03-05 RX ADMIN — ERYTHROPOIETIN 20000 UNITS: 10000 INJECTION, SOLUTION INTRAVENOUS; SUBCUTANEOUS at 11:03

## 2023-03-05 RX ADMIN — TACROLIMUS 2 MG: 1 CAPSULE ORAL at 09:03

## 2023-03-05 RX ADMIN — APIXABAN 5 MG: 5 TABLET, FILM COATED ORAL at 09:03

## 2023-03-05 RX ADMIN — HYDRALAZINE HYDROCHLORIDE 50 MG: 25 TABLET, FILM COATED ORAL at 04:03

## 2023-03-05 RX ADMIN — IPRATROPIUM BROMIDE AND ALBUTEROL SULFATE 3 ML: 2.5; .5 SOLUTION RESPIRATORY (INHALATION) at 08:03

## 2023-03-05 RX ADMIN — COLCHICINE 0.3 MG: 0.6 TABLET, FILM COATED ORAL at 09:03

## 2023-03-05 RX ADMIN — FLUTICASONE FUROATE AND VILANTEROL TRIFENATATE 1 PUFF: 100; 25 POWDER RESPIRATORY (INHALATION) at 08:03

## 2023-03-05 RX ADMIN — Medication 6 MG: at 09:03

## 2023-03-05 RX ADMIN — DOXAZOSIN 4 MG: 2 TABLET ORAL at 08:03

## 2023-03-05 RX ADMIN — GABAPENTIN 100 MG: 100 CAPSULE ORAL at 09:03

## 2023-03-05 RX ADMIN — PREDNISONE 5 MG: 5 TABLET ORAL at 10:03

## 2023-03-05 RX ADMIN — FUROSEMIDE 80 MG: 10 INJECTION, SOLUTION INTRAMUSCULAR; INTRAVENOUS at 05:03

## 2023-03-05 NOTE — ASSESSMENT & PLAN NOTE
Baseline seems to be a creatinine in the 2s   Cr still elevated unchanged at 3.8 despite IVF. Scr increased to 4.0 --> 3.8  US no overly revealing; Transplant working up rejection  - maintain urinary output but creatinine seems to be plateauing now at 3.8-4.0.  Patient affirms he does not want dialysis.    -IV lasix BID per transplant nephro --> PO BID  - Cr plateued with pearson in place; transplant nephro suspects failure of transplanted kidney

## 2023-03-05 NOTE — PLAN OF CARE
Problem: Adult Inpatient Plan of Care  Goal: Plan of Care Review  Outcome: Ongoing, Progressing  *Reviewed with pt and wife.     Problem: Oral Intake Inadequate (Acute Kidney Injury/Impairment)  Goal: Optimal Nutrition Intake  Outcome: Ongoing, Progressing  *Encouraged pt to drink more fluid and encouraged wife to give pt more fluid.     Problem: Coping Ineffective  Goal: Effective Coping  Outcome: Ongoing, Progressing  *Pt was tearful this shift; primary nurse attempted to explained and ease pt    Will continue to monitor pt.

## 2023-03-05 NOTE — PROGRESS NOTES
[facility] - [facility] ([address] Medicine  Progress Note    Patient Name: Ibrahima Phelps  MRN: [id number]  Patient Class: IP- Inpatient   Admission Date: 2/20/2023  Length of Stay: 13 days  Attending Physician: Russ Vizcaino MD  Primary Care Provider: Connie Magdaleno MD        Subjective:     Principal Problem:Acute kidney injury superimposed on CKD        HPI:  67-year-old black male transplant kidney patient being admitted for KATINA.  Patient was just discharged from a hospitalization for COVID pneumonia on 02/10/2023.  He also paid in ED visit a few days ago for what could have been a mild cellulitis/folliculitis on his foot for which he is almost done with his Keflex regimen.    Patient was instructed to come to the ED this time by the COVID surveillance nurses due to desaturation down to 88%.  When I talked with the patient and his wife in detail, they affirmed that he was not having any new or worsening shortness of breath since his discharge.  His wife did report that once the patient woke up/sat up his desaturation spontaneously resolved.  Patient denies any steven current shortness of breath or chest pain.  No nausea vomiting or flu fever like feeling.  In my discussion with the ED provider who was noted that the patient was complaining of shortness of breath but there was a concern that this likely was anxiety triggered.  Patient seems to be saturating well on room air here in the ED and chest x-ray which I personally reviewed is clear bilaterally.  Wife reports patient has been compliant with his medications.    The blood work did demonstrate an acutely elevated creatinine of 3.8 whereas his baseline is in the 2s.      Overview/Hospital Course:  67-year-old renal transplant black male was admitted for acute on chronic kidney injury.  Patient was just discharged from hospitalization for COVID pneumonia on 02/10/2023.  Presented to the ED per recommendations of the COVID surveillance team.   Did not have any definitive acute respiratory findings to indicate acute ongoing infectious COVID, chest x-ray was clear.  However creatinine was significantly elevated up to 3.8 whereas baseline was in the 2s.  Was started on IV fluids with trivial improvement.  CT chest was obtained due to his persistent unchanged severe dyspnea which showed some small bilateral effusions, not enough to drain via thoracentesis.  Patient was started on diuresis with improvement in shortness of breath.  Creatinine seemed to plateau around 3.9.  Patient did get delirious and was started on Depakote. Switched from GGT to IV lasix; and then switched to po lasix.      Interval History:  No acute issues overnight.  No chest pain, shortness breast, no nausea or vomiting.  Afebrile.  No obvious lower extremity edema.  Creatinine plateauing    Review of Systems  Objective:     Vital Signs (Most Recent):  Temp: 98.1 °F (36.7 °C) (03/05/23 1151)  Pulse: 92 (03/05/23 1527)  Resp: 20 (03/05/23 1527)  BP: 125/67 (03/05/23 1151)  SpO2: (!) 94 % (03/05/23 1527)   Vital Signs (24h Range):  Temp:  [97.5 °F (36.4 °C)-98.9 °F (37.2 °C)] 98.1 °F (36.7 °C)  Pulse:  [75-98] 92  Resp:  [16-20] 20  SpO2:  [92 %-98 %] 94 %  BP: (125-171)/() 125/67     Weight: 110.5 kg (243 lb 9.7 oz)  Body mass index is 32.14 kg/m².    Intake/Output Summary (Last 24 hours) at 3/5/2023 1608  Last data filed at 3/5/2023 1345  Gross per 24 hour   Intake 360 ml   Output 1700 ml   Net -1340 ml      Physical Exam  Clear lungs bilaterally, unlabored breathing, room air, no cyanosis   Heart sounds indicate a regular rate and rhythm   No obvious lower extremity edema   Hamilton catheter in place        Assessment/Plan:      66 y/o BM admitted w/ KATINA on CKD of transplanted kidney    * Acute kidney injury superimposed on CKD  Baseline seems to be a creatinine in the 2s   Cr still elevated unchanged at 3.8 despite IVF. Scr increased to 4.0 --> 3.8  US no overly revealing; Transplant  "working up rejection  - maintain urinary output but creatinine seems to be plateauing now at 3.8-4.0.  Patient affirms he does not want dialysis.    -IV lasix BID per transplant nephro --> PO BID  - Cr plateued with pearson in place; transplant nephro suspects failure of transplanted kidney          H/O kidney transplant  K transplant service following  Routine prograf levels  Continue home Prograf 2 mg b.i.d.  Continue home prednisone  - can restart on  BID at the time of discharge      Chronic dyspnea  Acute hypoxic respiratory failure  -Unchanged prior to admission; Don't suspect acute covid  - seems resolved; on RA now  -CT chest w/o contrast showing BL pleural effusions; unable to perform thoracentesis due to insufficient fluid for safe procedure   -improved w/ aggressive diuresis      Advance care planning  - patient expressed that he would not like to pursue dialysis if renal failure progressed   - Palliative care consulted. apprec recs      Palliative care encounter  - see "goals of care discussion and advance care planning"      Delirium  Much improved after being switched from Depakote to Zyprexa    Right ankle pain  -resolved w/ colchicine, likely was gout      Acute on chronic diastolic heart failure  - acute element resolved  - IV lasix push -- > lasix gtt on 2/27. Added IV diuril BID on 2/28 per nephro recs -- > back to IV lasix BID push now  - euvolemic; acute element resolved    Paroxysmal atrial fibrillation  Continue home Eliquis, metoprolol  - EP cardiology consulted, apprec recs  -- holding home  Flecainide due to KATINA        CAD (coronary artery disease)  History of stenting years ago   Continue home aspirin and statin, Toprol      Hypertension  Continue home metoprolol, nifedipine, hydralazine      Long-term use of immunosuppressant medication  - see "kidney transplant"        VTE Risk Mitigation (From admission, onward)         Ordered     apixaban tablet 5 mg  2 times daily         " 02/28/23 1904     Place sequential compression device  Until discontinued         02/27/23 1212                Discharge Planning   RAMU: 3/5/2023     Code Status: Full Code   Is the patient medically ready for discharge?: No    Reason for patient still in hospital (select all that apply): Treatment  Discharge Plan A: Home with family   Discharge Delays: None known at this time      DC tomorrow per transplant surgery after reconvene with palliative Care data that patient refusing dialysis, informed him of the possible deterioration that occurs with renal failure without dialysis.  Wife present at the bedside.        Russ Vizcaino MD  Department of Hospital Medicine   [facility] - Intensive Care ([place])

## 2023-03-05 NOTE — CLINICAL REVIEW
KTM Chart Review      Intake/Output Summary (Last 24 hours) at 3/5/2023 1302  Last data filed at 3/5/2023 0552  Gross per 24 hour   Intake 240 ml   Output 1475 ml   Net -1235 ml       Vitals:    03/05/23 0842 03/05/23 1101 03/05/23 1120 03/05/23 1151   BP:    125/67   BP Location:       Patient Position:       Pulse: 85 91 92 98   Resp:   20 18   Temp:       TempSrc:       SpO2:    (!) 93%   Weight:       Height:           Recent Labs   Lab 02/27/23  0051 02/27/23  0246 02/28/23  0440 03/03/23  0605 03/04/23  0559 03/05/23  0325    138   < > 142 139 141   K 4.0 4.7   < > 3.7 3.2* 3.8    104   < > 97 97 101   CO2 22* 22*   < > 32* 32* 29   BUN 50* 46*   < > 49* 50* 48*   CREATININE 3.9* 3.8*   < > 4.1* 4.2* 4.0*   CALCIUM 7.6* 7.7*   < > 8.5* 8.5* 8.5*   PHOS 5.9* 6.2*  --   --   --   --     < > = values in this interval not displayed.       Immunosuppression Level - 4.3  Continue current IS at current doses  Discussed with HM that patient kidney is failing and though he does not need dialysis now, we consulted palliative care because he said he would never want to have dialysis again even if his life depended on it. He will need good outpatient follow up plan for medical management of his IS, progressive kidney disease, and palliation when the time comes. Follows with Dr. Latif outpatient for nephrology    No other related issues identified. Please call KTM as needed; We will continue to follow.    Jeff Diaz Jr.

## 2023-03-05 NOTE — SUBJECTIVE & OBJECTIVE
Interval History:  No acute issues overnight.  No chest pain, shortness breast, no nausea or vomiting.  Afebrile.  No obvious lower extremity edema.  Creatinine plateauing    Review of Systems  Objective:     Vital Signs (Most Recent):  Temp: 98.1 °F (36.7 °C) (03/05/23 1151)  Pulse: 92 (03/05/23 1527)  Resp: 20 (03/05/23 1527)  BP: 125/67 (03/05/23 1151)  SpO2: (!) 94 % (03/05/23 1527)   Vital Signs (24h Range):  Temp:  [97.5 °F (36.4 °C)-98.9 °F (37.2 °C)] 98.1 °F (36.7 °C)  Pulse:  [75-98] 92  Resp:  [16-20] 20  SpO2:  [92 %-98 %] 94 %  BP: (125-171)/() 125/67     Weight: 110.5 kg (243 lb 9.7 oz)  Body mass index is 32.14 kg/m².    Intake/Output Summary (Last 24 hours) at 3/5/2023 1608  Last data filed at 3/5/2023 1345  Gross per 24 hour   Intake 360 ml   Output 1700 ml   Net -1340 ml      Physical Exam  Clear lungs bilaterally, unlabored breathing, room air, no cyanosis   Heart sounds indicate a regular rate and rhythm   No obvious lower extremity edema   Hamilton catheter in place

## 2023-03-06 VITALS
HEART RATE: 86 BPM | OXYGEN SATURATION: 94 % | SYSTOLIC BLOOD PRESSURE: 141 MMHG | WEIGHT: 216.94 LBS | BODY MASS INDEX: 28.75 KG/M2 | DIASTOLIC BLOOD PRESSURE: 72 MMHG | TEMPERATURE: 98 F | RESPIRATION RATE: 18 BRPM | HEIGHT: 73 IN

## 2023-03-06 PROBLEM — R41.0 DELIRIUM: Status: RESOLVED | Noted: 2023-02-28 | Resolved: 2023-03-06

## 2023-03-06 PROBLEM — I50.33 ACUTE ON CHRONIC DIASTOLIC HEART FAILURE: Status: RESOLVED | Noted: 2020-04-08 | Resolved: 2023-03-06

## 2023-03-06 LAB
ALBUMIN SERPL BCP-MCNC: 2.3 G/DL (ref 3.5–5.2)
ANION GAP SERPL CALC-SCNC: 16 MMOL/L (ref 8–16)
B19V DNA # SPEC NAA+PROBE: NOT DETECTED COPIES/ML
BUN SERPL-MCNC: 46 MG/DL (ref 8–23)
CALCIUM SERPL-MCNC: 8.7 MG/DL (ref 8.7–10.5)
CHLORIDE SERPL-SCNC: 100 MMOL/L (ref 95–110)
CO2 SERPL-SCNC: 27 MMOL/L (ref 23–29)
CREAT SERPL-MCNC: 4.1 MG/DL (ref 0.5–1.4)
EST. GFR  (NO RACE VARIABLE): 15.1 ML/MIN/1.73 M^2
GLUCOSE SERPL-MCNC: 79 MG/DL (ref 70–110)
PHOSPHATE SERPL-MCNC: 6.1 MG/DL (ref 2.7–4.5)
POTASSIUM SERPL-SCNC: 3.9 MMOL/L (ref 3.5–5.1)
SODIUM SERPL-SCNC: 143 MMOL/L (ref 136–145)
SPECIMEN SOURCE: NORMAL
TACROLIMUS BLD-MCNC: 3.6 NG/ML (ref 5–15)

## 2023-03-06 PROCEDURE — 63600175 PHARM REV CODE 636 W HCPCS: Performed by: INTERNAL MEDICINE

## 2023-03-06 PROCEDURE — 99232 PR SUBSEQUENT HOSPITAL CARE,LEVL II: ICD-10-PCS | Mod: GC,,, | Performed by: INTERNAL MEDICINE

## 2023-03-06 PROCEDURE — 94640 AIRWAY INHALATION TREATMENT: CPT

## 2023-03-06 PROCEDURE — 63600175 PHARM REV CODE 636 W HCPCS: Performed by: HOSPITALIST

## 2023-03-06 PROCEDURE — 99900035 HC TECH TIME PER 15 MIN (STAT)

## 2023-03-06 PROCEDURE — 94664 DEMO&/EVAL PT USE INHALER: CPT

## 2023-03-06 PROCEDURE — 25000003 PHARM REV CODE 250: Performed by: INTERNAL MEDICINE

## 2023-03-06 PROCEDURE — 80069 RENAL FUNCTION PANEL: CPT | Performed by: STUDENT IN AN ORGANIZED HEALTH CARE EDUCATION/TRAINING PROGRAM

## 2023-03-06 PROCEDURE — 99239 PR HOSPITAL DISCHARGE DAY,>30 MIN: ICD-10-PCS | Mod: ,,, | Performed by: HOSPITALIST

## 2023-03-06 PROCEDURE — 99233 PR SUBSEQUENT HOSPITAL CARE,LEVL III: ICD-10-PCS | Mod: ,,, | Performed by: CLINICAL NURSE SPECIALIST

## 2023-03-06 PROCEDURE — 99233 SBSQ HOSP IP/OBS HIGH 50: CPT | Mod: ,,, | Performed by: CLINICAL NURSE SPECIALIST

## 2023-03-06 PROCEDURE — 99239 HOSP IP/OBS DSCHRG MGMT >30: CPT | Mod: ,,, | Performed by: HOSPITALIST

## 2023-03-06 PROCEDURE — 1111F PR DISCHARGE MEDS RECONCILED W/ CURRENT OUTPATIENT MED LIST: ICD-10-PCS | Mod: CPTII,,, | Performed by: HOSPITALIST

## 2023-03-06 PROCEDURE — 97535 SELF CARE MNGMENT TRAINING: CPT

## 2023-03-06 PROCEDURE — 36415 COLL VENOUS BLD VENIPUNCTURE: CPT | Performed by: HOSPITALIST

## 2023-03-06 PROCEDURE — 25000003 PHARM REV CODE 250: Performed by: HOSPITALIST

## 2023-03-06 PROCEDURE — 36415 COLL VENOUS BLD VENIPUNCTURE: CPT | Performed by: STUDENT IN AN ORGANIZED HEALTH CARE EDUCATION/TRAINING PROGRAM

## 2023-03-06 PROCEDURE — 80197 ASSAY OF TACROLIMUS: CPT | Performed by: HOSPITALIST

## 2023-03-06 PROCEDURE — 99232 SBSQ HOSP IP/OBS MODERATE 35: CPT | Mod: GC,,, | Performed by: INTERNAL MEDICINE

## 2023-03-06 PROCEDURE — 1111F DSCHRG MED/CURRENT MED MERGE: CPT | Mod: CPTII,,, | Performed by: HOSPITALIST

## 2023-03-06 PROCEDURE — 25000242 PHARM REV CODE 250 ALT 637 W/ HCPCS: Performed by: INTERNAL MEDICINE

## 2023-03-06 RX ORDER — TACROLIMUS 1 MG/1
CAPSULE ORAL
Qty: 270 CAPSULE | Refills: 3 | Status: SHIPPED | OUTPATIENT
Start: 2023-03-06 | End: 2023-04-10 | Stop reason: SDUPTHER

## 2023-03-06 RX ORDER — COLCHICINE 0.6 MG/1
0.6 TABLET, FILM COATED ORAL DAILY
Status: DISCONTINUED | OUTPATIENT
Start: 2023-03-06 | End: 2023-03-06 | Stop reason: HOSPADM

## 2023-03-06 RX ORDER — FUROSEMIDE 80 MG/1
80 TABLET ORAL 2 TIMES DAILY
Qty: 60 TABLET | Refills: 0 | Status: SHIPPED | OUTPATIENT
Start: 2023-03-06 | End: 2023-07-20 | Stop reason: DRUGHIGH

## 2023-03-06 RX ORDER — GABAPENTIN 100 MG/1
100 CAPSULE ORAL NIGHTLY
Qty: 30 CAPSULE | Refills: 0 | Status: ON HOLD | OUTPATIENT
Start: 2023-03-06 | End: 2023-09-01 | Stop reason: SDUPTHER

## 2023-03-06 RX ADMIN — HYDRALAZINE HYDROCHLORIDE 50 MG: 25 TABLET, FILM COATED ORAL at 08:03

## 2023-03-06 RX ADMIN — ACETAMINOPHEN 1000 MG: 500 TABLET ORAL at 03:03

## 2023-03-06 RX ADMIN — DOXAZOSIN 4 MG: 2 TABLET ORAL at 10:03

## 2023-03-06 RX ADMIN — FLUTICASONE FUROATE AND VILANTEROL TRIFENATATE 1 PUFF: 100; 25 POWDER RESPIRATORY (INHALATION) at 07:03

## 2023-03-06 RX ADMIN — HYDRALAZINE HYDROCHLORIDE 50 MG: 25 TABLET, FILM COATED ORAL at 03:03

## 2023-03-06 RX ADMIN — APIXABAN 5 MG: 5 TABLET, FILM COATED ORAL at 08:03

## 2023-03-06 RX ADMIN — TRAMADOL HYDROCHLORIDE 25 MG: 50 TABLET, COATED ORAL at 03:03

## 2023-03-06 RX ADMIN — FAMOTIDINE 20 MG: 20 TABLET ORAL at 08:03

## 2023-03-06 RX ADMIN — NIFEDIPINE 60 MG: 30 TABLET, FILM COATED, EXTENDED RELEASE ORAL at 08:03

## 2023-03-06 RX ADMIN — ASPIRIN 81 MG: 81 TABLET, COATED ORAL at 08:03

## 2023-03-06 RX ADMIN — METOPROLOL SUCCINATE 25 MG: 25 TABLET, EXTENDED RELEASE ORAL at 08:03

## 2023-03-06 RX ADMIN — FUROSEMIDE 80 MG: 20 TABLET ORAL at 08:03

## 2023-03-06 RX ADMIN — IPRATROPIUM BROMIDE AND ALBUTEROL SULFATE 3 ML: 2.5; .5 SOLUTION RESPIRATORY (INHALATION) at 07:03

## 2023-03-06 RX ADMIN — PARICALCITOL 1 MCG: 1 CAPSULE, LIQUID FILLED ORAL at 08:03

## 2023-03-06 RX ADMIN — COLCHICINE 0.6 MG: 0.6 CAPSULE ORAL at 12:03

## 2023-03-06 RX ADMIN — CALCIUM ACETATE 1334 MG: 667 CAPSULE ORAL at 08:03

## 2023-03-06 RX ADMIN — PREDNISONE 5 MG: 5 TABLET ORAL at 08:03

## 2023-03-06 RX ADMIN — ACETAMINOPHEN 1000 MG: 500 TABLET ORAL at 08:03

## 2023-03-06 RX ADMIN — CALCIUM ACETATE 1334 MG: 667 CAPSULE ORAL at 12:03

## 2023-03-06 RX ADMIN — TACROLIMUS 2 MG: 1 CAPSULE ORAL at 08:03

## 2023-03-06 NOTE — PLAN OF CARE
Rsus Vizcaino MD   Physician  [facility] Medicine  Plan of Care     Signed  Creation Time:  3/6/2023  1:49 PM     Expand All Collapse All                                                                                                                                                                                                                                                                                                                                                                                        [facility] - Intensive Care ([place] [address])        HOME HEALTH ORDERS  FACE TO FACE ENCOUNTER     Patient Name: Ibrahima Phelps  YOB: 1955     PCP: Connie Magdaleno MD   PCP Address: [facility] [address] / [place] [address]  PCP Phone Number: [phone]  PCP Fax: [phone]     Encounter Date: 2/20/23     Admit to Home Health     Diagnoses:         Active Hospital Problems     Diagnosis   POA    *Acute kidney injury superimposed on CKD [N17.9, N18.9]   Yes       Priority: 1 - High    H/O kidney transplant [Z94.0]   Not Applicable       Priority: 2     Chronic dyspnea [R06.09]   Yes       Priority: 3     Palliative care encounter [Z51.5]   Not Applicable    Advance care planning [Z71.89]   Not Applicable    Right ankle pain [M25.571]   No    Paroxysmal atrial fibrillation [I48.0]   Yes    Hypertension [I10]   Yes    CAD (coronary artery disease) [I25.10]   Yes       Negative stress echo 2009  ? Hx of pericarditis       Long-term use of immunosuppressant medication [Z79.60]   Not Applicable       Resolved Hospital Problems     Diagnosis Date Resolved POA    Goals of care, counseling/discussion [Z71.89] 03/03/2023 Not Applicable    Acute hypoxemic respiratory failure [J96.01] 03/02/2023 Yes    Delirium [R41.0] 03/06/2023 No    Hypoxia [R09.02] 03/02/2023 Yes    Wheezing [R06.2] 03/02/2023 Yes    Anemia of renal disease [N18.9, D63.1] 03/02/2023 Yes    Cellulitis of foot [L03.119] 03/02/2023 Yes     Hypocalcemia [E83.51] 03/02/2023 Yes    Debility [R53.81] 03/02/2023 No    Anemia due to stage 4 chronic kidney disease [N18.4, D63.1] 03/02/2023 Yes    Acute on chronic diastolic heart failure [I50.33] 03/06/2023 No       TTE (2/9/23):  The left ventricle is normal in size with normal systolic function. The estimated ejection fraction is 65%.  Normal right ventricular size with normal right ventricular systolic function.  Grade II left ventricular diastolic dysfunction.  Severe left atrial enlargement.  There is mild aortic valve stenosis. Aortic valve area is 1.8 cm2; peak velocity is 2.9 m/s; mean gradient is 15 mmHg.  Pulmonary HTN - the estimated PA systolic pressure is > 41mm Hg. (The IVC was not well visualized.)                  Follow Up Appointments:         Future Appointments   Date Time Provider Department Center   3/9/2023  8:30 AM HAO [facility] II, [facility]   3/13/2023  2:30 PM PRE-ADMIT, ENDO -[facility] ENDO[facility]   6/20/2023  9:40 AM Marcos Brunson MD [facility] PAM EPI [facility]         Allergies:        Review of patient's allergies indicates:   Allergen Reactions    Ace inhibitors Swelling    Verapamil Other (See Comments)       Other reaction(s): Unknown    Povidone-iodine Itching         Medications: Review discharge medications with patient and family and provide education.     Current Medications             Current Facility-Administered Medications   Medication Dose Route Frequency Provider Last Rate Last Admin    acetaminophen tablet 1,000 mg  1,000 mg Oral TID Morris Payan MD   1,000 mg at 03/06/23 0815    albuterol inhaler 2 puff  2 puff Inhalation Q6H PRN Russ Vizcaino MD   2 puff at 02/26/23 1110    albuterol-ipratropium 2.5 mg-0.5 mg/3 mL nebulizer solution 3 mL  3 mL Nebulization Q4H WAKE Morris Payan MD   3 mL at 03/06/23 0756    albuterol-ipratropium 2.5 mg-0.5 mg/3 mL nebulizer solution 3 mL  3 mL Nebulization Q4H PRN Morris  AUTUMN Payan MD        apixaban tablet 5 mg  5 mg Oral BID Morris Payan MD   5 mg at 03/06/23 0818    aspirin EC tablet 81 mg  81 mg Oral Daily Russ Vizcaino MD   81 mg at 03/06/23 0816    atorvastatin tablet 40 mg  40 mg Oral QHS Russ Vizcaino MD   40 mg at 03/05/23 2104    calcium acetate(phosphat bind) capsule 1,334 mg  1,334 mg Oral TID  Morris Payan MD   1,334 mg at 03/06/23 1229    clonazePAM tablet 0.5 mg  0.5 mg Oral BID PRN Russ Vizcaino MD   0.5 mg at 03/01/23 2102    colchicine capsule/tablet 0.6 mg  0.6 mg Oral Daily Russ Vizcaino MD   0.6 mg at 03/06/23 1228    doxazosin tablet 4 mg  4 mg Oral Q12H Russ Vizcaino MD   4 mg at 03/06/23 1017    epoetin samara injection 20,000 Units  20,000 Units Subcutaneous Q7 Days Morris Payan MD   20,000 Units at 03/05/23 1121    famotidine tablet 20 mg  20 mg Oral Daily Russ Vizcaino MD   20 mg at 03/06/23 0816    fluticasone furoate-vilanteroL 100-25 mcg/dose diskus inhaler 1 puff  1 puff Inhalation Daily Russ Vizcaino MD   1 puff at 03/06/23 0757    furosemide tablet 80 mg  80 mg Oral BID Russ Vizcaino MD   80 mg at 03/06/23 0816    gabapentin capsule 100 mg  100 mg Oral QHS Morris Payan MD   100 mg at 03/05/23 2104    hydrALAZINE tablet 50 mg  50 mg Oral TID Russ Vizcaino MD   50 mg at 03/06/23 0816    melatonin tablet 6 mg  6 mg Oral Nightly PRN Jesica Acevedo MD   6 mg at 03/05/23 2105    metoprolol succinate (TOPROL-XL) 24 hr split tablet 25 mg  25 mg Oral Daily Russ Vizcaino MD   25 mg at 03/06/23 0817    NIFEdipine 24 hr tablet 60 mg  60 mg Oral BID Russ Vizcaino MD   60 mg at 03/06/23 0817    OLANZapine injection 2.5 mg  2.5 mg Intramuscular Once PRN Russ Vizcaino MD        OLANZapine tablet 10 mg  10 mg Oral QHS Russ Vizcaino MD   10 mg at 03/05/23 2104    paricalcitoL capsule 1 mcg  1 mcg Oral Daily Russ Vizcaino MD   1 mcg at 03/06/23 0830    predniSONE tablet 5 mg  5 mg  Oral Daily Russ Vizcaino MD   5 mg at 03/06/23 0817    tacrolimus capsule 2 mg  2 mg Oral BID Amy Bryant MD   2 mg at 03/06/23 0819    tramadol split tablet 25 mg  25 mg Oral Q6H PRN Morris Payan MD   25 mg at 03/05/23 1856         Current Discharge Medication List               CONTINUE these medications which have CHANGED     Details   furosemide (LASIX) 80 MG tablet Take 1 tablet (80 mg total) by mouth 2 (two) times daily.  Qty: 60 tablet, Refills: 0       gabapentin (NEURONTIN) 100 MG capsule Take 1 capsule (100 mg total) by mouth every evening.  Qty: 30 capsule, Refills: 0       tacrolimus (PROGRAF) 1 MG Cap Take 2 capsules (2 mg total) by mouth every morning AND 2 capsules (2 mg total) every evening.  Qty: 270 capsule, Refills: 3     Associated Diagnoses: Kidney replaced by transplant                   CONTINUE these medications which have NOT CHANGED     Details   acetaminophen (TYLENOL) 500 MG tablet Take 1 tablet (500 mg total) by mouth every 6 (six) hours as needed for Pain.  Qty: 20 tablet, Refills: 0       albuterol (PROVENTIL) 2.5 mg /3 mL (0.083 %) nebulizer solution Take 3 mLs (2.5 mg total) by nebulization every 6 (six) hours as needed for Wheezing. Rescue  Qty: 9 mL, Refills: 6       albuterol (VENTOLIN HFA) 90 mcg/actuation inhaler Inhale 2 puffs into the lungs every 6 (six) hours as needed for Wheezing or Shortness of Breath. Rescue  Qty: 18 g, Refills: 3     Associated Diagnoses: Other emphysema       ammonium lactate 12 % Crea Apply 1 g topically once daily.  Qty: 140 g, Refills: 1       aspirin (ECOTRIN) 81 MG EC tablet Take 1 tablet by mouth Daily.       atorvastatin (LIPITOR) 10 MG tablet Take 10 mg by mouth.       calcium carbonate (CALCIUM 600 ORAL) Take by mouth. Pt taking 1200 daily       colchicine (MITIGARE) 0.6 mg Cap One po BID prn gout flare up to 3 days  Qty: 15 capsule, Refills: 11       doxazosin (CARDURA) 4 MG tablet TAKE 1 TABLET EVERY 12 HOURS  Qty: 180  tablet, Refills: 3     Associated Diagnoses: Hypertensive heart and renal disease with renal failure, stage 1 through stage 4 or unspecified chronic kidney disease, with heart failure       ELIQUIS 5 mg Tab TAKE 1 TABLET BY MOUTH TWICE A DAY  Qty: 180 tablet, Refills: 3       famotidine (PEPCID) 20 MG tablet TAKE 1 TABLET TWICE DAILY  Qty: 180 tablet, Refills: 3       fluticasone propion-salmeterol 115-21 mcg/dose (ADVAIR HFA) 115-21 mcg/actuation HFAA inhaler Inhale 2 puffs into the lungs every 12 (twelve) hours. Controller  Qty: 12 g, Refills: 5     Associated Diagnoses: Other emphysema       fluticasone propionate (FLONASE) 50 mcg/actuation nasal spray 1 spray (50 mcg total) by Each Nostril route once daily.  Qty: 18.2 mL, Refills: 0       hydrALAZINE (APRESOLINE) 50 MG tablet TAKE 1 TABLET (50 MG TOTAL) BY MOUTH 3 (THREE) TIMES DAILY.  Qty: 270 tablet, Refills: 3       LIDOcaine (LIDODERM) 5 % Place 1 patch onto the skin daily as needed (back pain). Remove & Discard patch within 12 hours or as directed by MD  Qty: 15 patch, Refills: 0       metoprolol succinate (TOPROL-XL) 25 MG 24 hr tablet Take 1 tablet (25 mg total) by mouth once daily.  Qty: 90 tablet, Refills: 3     Comments: .  Associated Diagnoses: Chronic diastolic heart failure       multivitamin (THERAGRAN) per tablet Take 1 tablet by mouth Daily.       NIFEdipine (PROCARDIA-XL) 60 MG (OSM) 24 hr tablet Take 1 tablet (60 mg total) by mouth 2 (two) times a day.  Qty: 14 tablet, Refills: 0     Comments: .  Associated Diagnoses: Hypertensive heart and renal disease with renal failure, stage 1 through stage 4 or unspecified chronic kidney disease, with heart failure       omeprazole (PRILOSEC) 20 MG capsule Take 1 capsule (20 mg total) by mouth once daily.  Qty: 14 capsule, Refills: 0       paricalcitoL (ZEMPLAR) 1 MCG capsule TAKE 1 CAPSULE ON MONDAY, WEDNESDAY AND FRIDAY  Qty: 36 capsule, Refills: 3       predniSONE (DELTASONE) 5 MG tablet TAKE 1 TABLET  BY MOUTH EVERY DAY IN THE MORNING  Qty: 90 tablet, Refills: 3       pulse oximeter (PULSE OXIMETER) device by Apply Externally route 2 (two) times a day. Use twice daily at 8 AM and 3 PM and record the value in MyChart as directed.  Qty: 1 each, Refills: 0     Comments: This is a NO CHARGE item.  Please override price to zero.  DO NOT PRINT.  NORMAL MODE e-PRESCRIBE ONLY.       vitamin D 1000 units Tab Take 370 mg by mouth 2 (two) times daily. 2 tablets BID                   STOP taking these medications         cephALEXin (KEFLEX) 500 MG capsule Comments:   Reason for Stopping:            clonazePAM (KLONOPIN) 0.5 MG tablet Comments:   Reason for Stopping:            cyclobenzaprine (FLEXERIL) 5 MG tablet Comments:   Reason for Stopping:            fexofenadine (ALLEGRA) 60 MG tablet Comments:   Reason for Stopping:            flecainide (TAMBOCOR) 50 MG Tab Comments:   Reason for Stopping:            hyoscyamine (LEVSIN/SL) 0.125 mg Subl Comments:   Reason for Stopping:            potassium chloride (KLOR-CON 10) 10 MEQ TbSR Comments:   Reason for Stopping:                       I have seen and examined this patient within the last 30 days. My clinical findings that support the need for the home health skilled services and home bound status are the following:no              Mental confusion making it unsafe for patient to leave home alone due to  occasional encephalopathy due to renal disease; as well as medication compliance and inability to safely leave home..            Activities:   activity as tolerated     Nursing:   Agency to admit patient within 24 hours of hospital discharge unless specified on physician order or at patient request. Assist in managing Hamilton catheter.     SN to complete comprehensive assessment including routine vital signs. Instruct on disease process and s/s of complications to report to MD. Review/verify medication list sent home with the patient at time of discharge  and instruct  patient/caregiver as needed. Frequency may be adjusted depending on start of care date.      Skilled nurse to perform up to 3 visits PRN for symptoms related to diagnosis           Ok to schedule additional visits based on staff availability and patient request on consecutive days within the home health episode.     When multiple disciplines ordered:     Start of Care occurs on  - Wednesday schedule remaining discipline evaluations as ordered on separate consecutive days following the start of care.     Thursday SOC -schedule subsequent evaluations Friday and Monday the following week.      Friday - Saturday SOC - schedule subsequent discipline evaluations on consecutive days starting Monday of the following week.     For all post-discharge communication and subsequent orders please contact patient's primary care physician.      Home Health Aide:  Nursing Three times weekly, Physical Therapy Three times weekly, and Home Health Aide Three times weekly          I certify that this patient is confined to his home and needs intermittent skilled nursing care.               [facility] - Intensive Care ([place] [id number])       Encounter Date: 2023 12:11 PM    IP Admit Date: 23   Discharge Date: No discharge date for patient encounter.   Care Everywhere:  [id number]    MRN: [id number]   Guarantor: MARIA L SAUCEDO   Contact Serial #: [id number]   Hospital Account: [id number]       ENCOUNTER          Patient Class: Inpatient   Unit: [facility] ICU 16WT   Hospital Service: Hospital Medicine   Bed: [address] A   Admitting Provider: Russ Vizcaino Md   Referring Physician: Self, Aaareferral   Attending Provider: Russ Vizcaino Md   Adm Diagnosis: SOB (shortness of breath*   PATIENT                 Name: MARIA L SAUCEDO : 1955 (67 yrs)   Address: [address] [place] [address] Sex: Male   City: [place] [address] SSN:    Primary Care Provider: Connie Magdaleno MD         Primary Phone: [phone]   EMERGENCY  CONTACT   Contact Name Legal Guardian? Relationship to Patient Home Phone Work Phone Mobile Phone   1. Shea Phelps  2. *No Contact Specified* No    Spouse    [phone]   GUARANTOR                  Guarantor: MARIA L PHELPS       : 1955   Address: [address] [place] [address]    Sex: Male     [facility] [place] [address] Guarantor  Type: P/F   Relation to Patient: Self         Home Phone: [phone]   Guarantor ID: [id number]         Work Phone:     GUARANTOR EMPLOYER     Employer: Retired           Status: RETIRED   COVERAGE        PRIMARY INSURANCE   Payor: [employer] MANAGED MEDI* Plan: [employer] MEDICARE HMO   Group Number: [id number] Insurance Type: INDEMNITY   Subscriber Name: MARIA L PHELPS Subscriber : 1955   Subscriber ID: [id number] Insurance Address: [address] [address]   Pat. Rel. to Subscriber: Self       SECONDARY INSURANCE   Payor:   Plan:     Group Number:   Insurance Type:     Subscriber Name:   Subscriber :     Subscriber ID:   Insurance Address:     Pat. Rel. to Subscriber:            H & P  Notes from 23 through 23  H&P by Russ Vizcaino MD at 2023  5:40 PM    Author: Russ Vizcaino MD Service: Hospital Medicine Author Type: Physician   Filed: 2023  5:42 PM Creation Time: 2023  5:40 PM Status: Signed   : Russ Vizcaino MD (Physician)   [facility] - Emergency Dept  [facility] Medicine  History & Physical     Patient Name: Maria L Phelps  MRN: [id number]  Patient Class: IP- Inpatient  Admission Date: 2023  Attending Physician: Russ Vizcaino MD   Primary Care Provider: Connie Magdaleno MD           Patient information was obtained from patient and ER records.      Subjective:      Principal Problem:Acute-on-chronic kidney injury     Chief Complaint:        Chief Complaint   Patient presents with    Shortness of Breath       Reports home O2 sat 88% COVID + 13 days ago.         HPI: 67-year-old black male  transplant kidney patient being admitted for KATINA.  Patient was just discharged from a hospitalization for COVID pneumonia on 02/10/2023.  He also paid in ED visit a few days ago for what could have been a mild cellulitis/folliculitis on his foot for which he is almost done with his Keflex regimen.     Patient was instructed to come to the ED this time by the COVID surveillance nurses due to desaturation down to 88%.  When I talked with the patient and his wife in detail, they affirmed that he was not having any new or worsening shortness of breath since his discharge.  His wife did report that once the patient woke up/sat up his desaturation spontaneously resolved.  Patient denies any steven current shortness of breath or chest pain.  No nausea vomiting or flu fever like feeling.  In my discussion with the ED provider who was noted that the patient was complaining of shortness of breath but there was a concern that this likely was anxiety triggered.  Patient seems to be saturating well on room air here in the ED and chest x-ray which I personally reviewed is clear bilaterally.  Wife reports patient has been compliant with his medications.    The blood work did demonstrate an acutely elevated creatinine of 3.8 whereas his baseline is in the 2s.             Past Medical History:   Diagnosis Date    (HFpEF) heart failure with preserved ejection fraction 9/25/2017    Atrial fibrillation      CAD (coronary artery disease)      CHF (congestive heart failure)      Chronic diastolic heart failure 4/8/2020    CKD (chronic kidney disease) stage 3, GFR 30-59 ml/min       Dr. Latif    CKD (chronic kidney disease) stage 3, GFR 30-59 ml/min      Diverticulosis      Fever blister      Heart attack 2006    Hyperlipidemia      Hypertension      Immunodeficiency due to treatment with immunosuppressive medication      Keloid cicatrix      Left lower quadrant abdominal pain 6/5/2022    Metabolic bone disease      Pericarditis       "S/P kidney transplant      Supraventricular tachycardia 06/2019    Thrombocytopenia      Thyroid disease      Tobacco use 9/5/2014    Unspecified disorder of kidney and ureter       Stage IIICKD               Past Surgical History:   Procedure Laterality Date    CARDIOVERSION   04/30/15    CHOLECYSTECTOMY        COLONOSCOPY   April 20, 2011     Diverticulosis, repeat recommended in 3 yrs., repeat colonoscopy 2014 revealed 2 polyps.  He should return in 5 years.    COLONOSCOPY N/A 3/13/2020     Procedure: COLONOSCOPY;  Surgeon: Chon Casper MD;  Location: [facility] (4TH FLR);  Service: Endoscopy;  Laterality: N/A;  ok to hold coumadin x5days-see telephone encounter 2/4/20-tb    COLONOSCOPY N/A 2/4/2021     Procedure: COLONOSCOPY;  Surgeon: Chon Casper MD;  [facility]  Service: Endoscopy;  Laterality: N/A;  Eliquis - per Dr. GAVIN Blunt ok to hold x 2 days and "restarted asap"- ERW  last prep "poor", [id number] ordered/ Pt requests Dr. Casper  prep ins. mailed - COVID screening on 2/1/21 PCW- ERW    COLONOSCOPY N/A 3/3/2021     Procedure: COLONOSCOPY;  Surgeon: Chon Casper MD;  [facility]  Service: Endoscopy;  Laterality: N/A;  Patient with his second poor bowel pre and has poor prep today.  If patient not intersted or can't get colonoscopy tomorrow will need constipation bowel prep and will need to restart his Eliquis today.Thanks,Chon     per Dr Blunt-ok to hold Eliquis 2 days prior      COVID     CORONARY ANGIOPLASTY WITH STENT PLACEMENT   9/13/2006    KIDNEY TRANSPLANT   2005    PARATHYROIDECTOMY        TREATMENT OF CARDIAC ARRHYTHMIA N/A 3/28/2022     Procedure: CARDIOVERSION;  Surgeon: Migue Blunt MD;  Location: [facility] EP LAB;  Service: Cardiology;  Laterality: N/A;  AF, DCC, MAC, GP, 3 PREP*RODRIGO deferred pt 100% compliant*               Review of patient's allergies indicates:   Allergen Reactions    Ace inhibitors Swelling    Verapamil Other (See Comments) "       Other reaction(s): Unknown    Povidone-iodine Itching         No current facility-administered medications on file prior to encounter.           Current Outpatient Medications on File Prior to Encounter   Medication Sig    acetaminophen (TYLENOL) 500 MG tablet Take 1 tablet (500 mg total) by mouth every 6 (six) hours as needed for Pain.    albuterol (PROVENTIL) 2.5 mg /3 mL (0.083 %) nebulizer solution Take 3 mLs (2.5 mg total) by nebulization every 6 (six) hours as needed for Wheezing. Rescue    albuterol (VENTOLIN HFA) 90 mcg/actuation inhaler Inhale 2 puffs into the lungs every 6 (six) hours as needed for Wheezing or Shortness of Breath. Rescue    ammonium lactate 12 % Crea Apply 1 g topically once daily.    aspirin (ECOTRIN) 81 MG EC tablet Take 1 tablet by mouth Daily.    atorvastatin (LIPITOR) 10 MG tablet Take 10 mg by mouth.    calcium carbonate (CALCIUM 600 ORAL) Take by mouth. Pt taking 1200 daily    cephALEXin (KEFLEX) 500 MG capsule Take 1 capsule (500 mg total) by mouth 4 (four) times daily. for 7 days    clonazePAM (KLONOPIN) 0.5 MG tablet Take 1 tablet (0.5 mg total) by mouth 2 (two) times daily as needed for Anxiety. (Patient not taking: Reported on 1/5/2023)    colchicine (MITIGARE) 0.6 mg Cap One po BID prn gout flare up to 3 days    cyclobenzaprine (FLEXERIL) 5 MG tablet Take 5 mg by mouth 2 (two) times daily as needed.    doxazosin (CARDURA) 4 MG tablet TAKE 1 TABLET EVERY 12 HOURS    ELIQUIS 5 mg Tab TAKE 1 TABLET BY MOUTH TWICE A DAY    famotidine (PEPCID) 20 MG tablet TAKE 1 TABLET TWICE DAILY    fexofenadine (ALLEGRA) 60 MG tablet Take 1 tablet by mouth Twice daily as needed.    flecainide (TAMBOCOR) 50 MG Tab Take 1 tablet (50 mg total) by mouth every 12 (twelve) hours.    fluticasone propion-salmeterol 115-21 mcg/dose (ADVAIR HFA) 115-21 mcg/actuation HFAA inhaler Inhale 2 puffs into the lungs every 12 (twelve) hours. Controller    fluticasone propionate (FLONASE) 50 mcg/actuation  nasal spray 1 spray (50 mcg total) by Each Nostril route once daily.    furosemide (LASIX) 40 MG tablet Take 0.5 tablets (20 mg total) by mouth daily as needed (wt gain of 3 or more pounds in 1 day or 5 or more pounds in a week).    gabapentin (NEURONTIN) 300 MG capsule TAKE 1 CAPSULE BY MOUTH TWICE A DAY    hydrALAZINE (APRESOLINE) 50 MG tablet TAKE 1 TABLET (50 MG TOTAL) BY MOUTH 3 (THREE) TIMES DAILY.    hyoscyamine (LEVSIN/SL) 0.125 mg Subl Place 1 tablet (0.125 mg total) under the tongue every 4 (four) hours as needed. (Patient not taking: Reported on 2/13/2023)    LIDOcaine (LIDODERM) 5 % Place 1 patch onto the skin daily as needed (back pain). Remove & Discard patch within 12 hours or as directed by MD    metoprolol succinate (TOPROL-XL) 25 MG 24 hr tablet Take 1 tablet (25 mg total) by mouth once daily.    multivitamin (THERAGRAN) per tablet Take 1 tablet by mouth Daily.    NIFEdipine (PROCARDIA-XL) 60 MG (OSM) 24 hr tablet Take 1 tablet (60 mg total) by mouth 2 (two) times a day.    omeprazole (PRILOSEC) 20 MG capsule Take 1 capsule (20 mg total) by mouth once daily.    paricalcitoL (ZEMPLAR) 1 MCG capsule TAKE 1 CAPSULE ON MONDAY, WEDNESDAY AND FRIDAY    potassium chloride (KLOR-CON 10) 10 MEQ TbSR Take 1 tablet (10 mEq total) by mouth daily as needed (to be taken with lasix).    predniSONE (DELTASONE) 5 MG tablet TAKE 1 TABLET BY MOUTH EVERY DAY IN THE MORNING    pulse oximeter (PULSE OXIMETER) device by Apply Externally route 2 (two) times a day. Use twice daily at 8 AM and 3 PM and record the value in [facility] as directed.    tacrolimus (PROGRAF) 0.5 MG Cap Take by mouth.    tacrolimus (PROGRAF) 1 MG Cap Two po in am and two po in pm    vitamin D 1000 units Tab Take 370 mg by mouth 2 (two) times daily. 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       Patient is currently retired   Substance and Sexual Activity    Alcohol use: No    Drug use: No    Sexual activity: Yes       Partners: Female      Review of Systems  Objective:      Vital Signs (Most Recent):  Temp: 98 °F (36.7 °C) (23 1632)  Pulse: 69 (23 1632)  Resp: (!) 22 (23 1632)  BP: 118/64 (23 1632)  SpO2: 95 % (23 1632)    Vital Signs (24h Range):  Temp:  [98 °F (36.7 °C)-98.2 °F (36.8 °C)] 98 °F (36.7 °C)  Pulse:  [69-87] 69  Resp:  [22-26] 22  SpO2:  [95 %] 95 %  BP: (118-137)/(64-77) 118/64      Weight: 101.6 kg (224 lb)  Body mass index is 29.55 kg/m².     Physical Exam  Vitals reviewed.            General: well nourished, nontoxic appearing  Skin: Warm and dry  Eyes: No conjunctivitis, no scleral icterus  ENT: No nasal bleeding, no obvious discharge  Cardiovascular: Regular rate and rhythm. No obvious JVD  Pulmonary/Chest: No accessory muscle use. Clear to auscultation bilaterally, N95 mask in place, no cyanosis  Gastrointestinal: Abdomen soft, NT, ND.  Extremities: no clubbing, cyanosis or edema  Musculoskeletal: appropriate muscle bulk, intact movement of extremities  Neurological: no facial droop, no slurred speech  Psychiatric: appropriate mood and affect, insight intact            Assessment/Plan:      * Acute-on-chronic kidney injury  Baseline seems to be a creatinine in the 2s   Gentle IV fluids, recheck in a.m.  See below for transplant treatment        H/O kidney transplant  K transplant service consulted   Taking Prograf level in a.m.   Continue home Prograf 2 mg  b.i.d.  Continue home prednisone        Cellulitis of foot  Finish regimen of cephalex in a few days        Anemia due to stage 4 chronic kidney disease  Stable, monitor        Chronic diastolic heart failure  Clinically euvolemic   Hold off on home dosing of p.r.n. Lasix given KATINA     Paroxysmal atrial fibrillation  Continue home Eliquis, flecainide, metoprolol           CAD (coronary artery disease)  History of stenting years ago   Continue home aspirin and statin, Toprol        Hypertension  Continue home metoprolol, nifedipine, hydralazine            VTE Risk Mitigation (From admission, onward)           Ordered       apixaban tablet 5 mg  2 times daily         02/20/23 1720                 I don't suspect acute covid; patient has no sustained ongoing respiratory symptoms; no need for isolation. 13 days out.       Russ Vizcaino MD  Department of Hospital Medicine   [facility] - Emergency Dept      Electronically signed by Russ Vizcaino MD on 2/20/2023  5:42 PM     D/C Summary    No notes found.     Medications    Scheduled    Medication Ordered Dose/Rate, Route, Frequency Last Action   acetaminophen tablet 1,000 mg 1,000 mg, Oral, TID Given, 1,000 mg at 03/06 1550   albuterol-ipratropium 2.5 mg-0.5 mg/3 mL nebulizer solution 3 mL 3 mL, NEBULIZATION, Q4H WAKE Given, 3 mL at 03/06 0756   apixaban tablet 5 mg 5 mg, Oral, BID Given, 5 mg at 03/06 0818   aspirin EC tablet 81 mg 81 mg, Oral, Daily Given, 81 mg at 03/06 0816   atorvastatin tablet 40 mg 40 mg, Oral, QHS Given, 40 mg at 03/05 2104   calcium acetate(phosphat bind) capsule 1,334 mg 1,334 mg, Oral, TID WM Given, 1,334 mg at 03/06 1229   colchicine capsule/tablet 0.6 mg 0.6 mg, Oral, Daily Given, 0.6 mg at 03/06 1228   doxazosin tablet 4 mg 4 mg, Oral, Q12H Given, 4 mg at 03/06 1017   epoetin samara injection 20,000 Units 20,000 Units, SubQ, Q7 Days Given, 20,000 Units at 03/05 1121   famotidine tablet 20 mg 20 mg, Oral, Daily Given, 20 mg at 03/06 0816    fluticasone furoate-vilanteroL 100-25 mcg/dose diskus inhaler 1 puff 1 puff, Inhl, Daily Given, 1 puff at 03/06 0757   furosemide tablet 80 mg 80 mg, Oral, BID Given, 80 mg at 03/06 0816   gabapentin capsule 100 mg 100 mg, Oral, QHS Given, 100 mg at 03/05 2104   hydrALAZINE tablet 50 mg 50 mg, Oral, TID Given, 50 mg at 03/06 1550   metoprolol succinate (TOPROL-XL) 24 hr split tablet 25 mg 25 mg, Oral, Daily Given, 25 mg at 03/06 0817   NIFEdipine 24 hr tablet 60 mg 60 mg, Oral, BID Given, 60 mg at 03/06 0817   OLANZapine tablet 10 mg 10 mg, Oral, QHS Given, 10 mg at 03/05 2104   paricalcitoL capsule 1 mcg 1 mcg, Oral, Daily Given, 1 mcg at 03/06 0830   predniSONE tablet 5 mg 5 mg, Oral, Daily Given, 5 mg at 03/06 0817   tacrolimus capsule 2 mg 2 mg, Oral, BID Given, 2 mg at 03/06 0819     PRN    Medication Ordered Dose/Rate, Route, Frequency Last Action   albuterol inhaler 2 puff 2 puff, Inhl, Q6H PRN Given, 2 puff at 02/26 1110   albuterol-ipratropium 2.5 mg-0.5 mg/3 mL nebulizer solution 3 mL 3 mL, NEBULIZATION, Q4H PRN Ordered   clonazePAM tablet 0.5 mg 0.5 mg, Oral, BID PRN Given, 0.5 mg at 03/01 2102   melatonin tablet 6 mg 6 mg, Oral, Nightly PRN Given, 6 mg at 03/05 2105   OLANZapine injection 2.5 mg 2.5 mg, IM, Once PRN Ordered   tramadol split tablet 25 mg 25 mg, Oral, Q6H PRN Given, 25 mg at 03/06 1522     Overdue / Upcoming  (Next 2 Hours)  Medication Ordered Dose/Rate, Route, Frequency Due   albuterol-ipratropium 2.5 mg-0.5 mg/3 mL nebulizer solution 3 mL 3 mL, NEBULIZATION, Q4H WAKE 03/06 1600   calcium acetate(phosphat bind) capsule 1,334 mg 1,334 mg, Oral, TID WM 03/06 1715        Radiology  (Last 24 hours)  None     Nursing Activity Orders  (From admission, onward)  None     Skin Assessment (last 2 days)    None     Patient Observations  (Last 24 hours)  BP    03/06 1548   141/72   03/06 1330  133/79   03/06 1134  134/60   03/06 0740   144/68   03/06 0513   155/67   03/05 2321  130/61   03/05 1948    162/74   03/05 1626   153/72     Pulse    03/06 1548  87   03/06 1330  97   03/06 1134  97   03/06 1114  98   03/06 0756  92   03/06 0740  92   03/06 0513  74   03/06 0326  87   03/05 2328  85   03/05 2321  91   03/05 1948  88   03/05 1933  86   03/05 1919  87   03/05 1626  96     Resp    03/06 1548  18   03/06 1522  18   03/06 1134  18   03/06 0756  20   03/06 0740  18   03/06 0513  16   03/05 2321  16   03/05 1948  20   03/05 1933  20   03/05 1856  16   03/05 1626  18     SpO2    03/06 1548   94 %   03/06 1330   93 %   03/06 1134  97 %   03/06 0740  96 %   03/06 0513  96 %   03/05 2321  95 %   03/05 1948   90 %   03/05 1933  95 %   03/05 1626   94 %     Temp src    03/06 1548  Oral   03/06 1134  Oral   03/06 0740  Oral   03/06 0513  Oral   03/05 2321  Oral   03/05 1948  Oral   03/05 1626  Oral     Temp    03/06 1548  97.8 °F (36.6 °C)   03/06 1134  97.8 °F (36.6 °C)   03/06 0740  98.5 °F (36.9 °C)   03/06 0513  98.3 °F (36.8 °C)   03/05 2321  98.6 °F (37 °C)   03/05 1948  99 °F (37.2 °C)   03/05 1626  97.6 °F (36.4 °C)     Notes    03/06 1025 Progress Notes by TRUDI Collazo Note   03/05 1610 Progress Notes by Russ Vizcaino MD Note

## 2023-03-06 NOTE — PROGRESS NOTES
[facility] - Intensive Care ([facility]-)  Kidney Transplant  Progress Note      Reason for Follow-up: Reassessment of Kidney Transplant - 4/12/2005 - Not Followed  (#2) recipient and management of immunosuppression.    Subjective:   History of Present Illness:  67 year old man with past medical history of kidney transplant times 2 first one in 1992 and then again in 2005. Has CKD with baseline creatinine high 2s. Recently admitted earlier this month with COVID. Was admitted with KATINA with cr 3.8 on admit. Last admission when he was admitted with COVID his cr did peak at 3.8 but when he was on 2/10 his cr was 2.6 at baseline. Patient denies any diarrhea after discharge from hospital. Says that he has a hard time urinating but no dysuria, no hematuria , no flank pain , no fevers. Was on keflex for mild cellulitis. Denies NSAID use. This morning patient was wheezing and had some SOB. He was started on IVF NS with no improvement in scr this morning was 3.8    Mr. Phelps is a 67 y.o. year old male who is status post Kidney Transplant - 4/12/2005 - Not Followed  (#2).    His maintenance immunosuppression consists of:   Immunosuppressants (From admission, onward)      Start     Stop Route Frequency Ordered    03/01/23 1800  tacrolimus capsule 2 mg         -- Oral 2 times daily 03/01/23 1151          Interval History: Patient seen and examined this AM. Wife at bedside. No acute events overnight. Afebrile with pulse ranging from 90-70s bpm. Systolic blood pressures ranging from 160-130s mmHg. He is saturating +90% on room air with documented UOP of 1.35 liters with two unmeasured voids in the last 24 hours. BUN 46 and creatinine 4.1 largely stable over past several days. Tacrolimus trough this morning 3.6 (~11.5 hours). Primary team with plans to discharge today. To follow-up outpatient with his nephrologist and Palliative Medicine.    Past Medical, Surgical, Family, and Social History:   Unchanged from H&P.    Scheduled  "Meds:   acetaminophen  1,000 mg Oral TID    albuterol-ipratropium  3 mL Nebulization Q4H WAKE    apixaban  5 mg Oral BID    aspirin  81 mg Oral Daily    atorvastatin  40 mg Oral QHS    calcium acetate(phosphat bind)  1,334 mg Oral TID WM    doxazosin  4 mg Oral Q12H    epoetin samara (PROCRIT) injection  20,000 Units Subcutaneous Q7 Days    famotidine  20 mg Oral Daily    fluticasone furoate-vilanteroL  1 puff Inhalation Daily    furosemide  80 mg Oral BID    gabapentin  100 mg Oral QHS    hydrALAZINE  50 mg Oral TID    metoprolol succinate  25 mg Oral Daily    NIFEdipine  60 mg Oral BID    OLANZapine  10 mg Oral QHS    paricalcitoL  1 mcg Oral Daily    predniSONE  5 mg Oral Daily    tacrolimus  2 mg Oral BID     Continuous Infusions:      PRN Meds:albuterol, albuterol-ipratropium, clonazePAM, melatonin, OLANZapine, traMADoL    Intake/Output - Last 3 Shifts         03/04 0700 03/05 0659 03/05 0700 03/06 0659 03/06 0700 03/07 0659    P.O. 600 120     Total Intake(mL/kg) 600 (5.4) 120 (1.2)     Urine (mL/kg/hr) 1525 (0.6) 1350 (0.6) 500 (4.2)    Emesis/NG output  0     Other 0 0     Stool 0 0     Blood  0     Total Output 1525 1350 500    Net -925 -1230 -500           Urine Occurrence 1 x 2 x     Stool Occurrence 0 x 1 x     Emesis Occurrence  0 x            Objective:     Vital Signs (Most Recent):  Temp: 98.5 °F (36.9 °C) (03/06/23 0740)  Pulse: 92 (03/06/23 0756)  Resp: 20 (03/06/23 0756)  BP: (!) 144/68 (03/06/23 0740)  SpO2: 96 % (03/06/23 0740)   Vital Signs (24h Range):  Temp:  [97.6 °F (36.4 °C)-99 °F (37.2 °C)] 98.5 °F (36.9 °C)  Pulse:  [74-98] 92  Resp:  [16-20] 20  SpO2:  [90 %-96 %] 96 %  BP: (125-162)/(61-74) 144/68     Weight: 98.4 kg (216 lb 14.9 oz)  Height: 6' 1" (185.4 cm)  Body mass index is 28.62 kg/m².    Physical Exam  Vitals and nursing note reviewed.   Constitutional:       General: He is awake. He is not in acute distress.     Appearance: He is well-developed. He is obese. " He is ill-appearing. He is not diaphoretic.   HENT:      Head: Normocephalic and atraumatic.      Right Ear: External ear normal.      Left Ear: External ear normal.      Nose: Nose normal.      Mouth/Throat:      Comments: Hoarseness noted.  Eyes:      General: No scleral icterus.        Right eye: No discharge.         Left eye: No discharge.      Extraocular Movements: Extraocular movements intact.      Conjunctiva/sclera: Conjunctivae normal.   Neck:      Vascular: No JVD.   Cardiovascular:      Rate and Rhythm: Normal rate.      Heart sounds: No murmur heard.    No friction rub. No gallop.   Pulmonary:      Effort: Pulmonary effort is normal. No respiratory distress.      Breath sounds: No wheezing, rhonchi or rales.      Comments: Diminished breath sounds bilaterally.  Abdominal:      General: Bowel sounds are normal. There is no distension.      Palpations: Abdomen is soft.      Tenderness: There is no abdominal tenderness.   Musculoskeletal:      Cervical back: Neck supple.      Right lower leg: No edema.      Left lower leg: No edema.   Skin:     General: Skin is warm and dry.      Coloration: Skin is not jaundiced.   Neurological:      Mental Status: He is alert. Mental status is at baseline.      Cranial Nerves: No cranial nerve deficit.      Motor: Weakness present.   Psychiatric:         Mood and Affect: Mood normal.         Behavior: Behavior normal. Behavior is cooperative.       Laboratory:  CBC:   Recent Labs   Lab 03/02/23  0453 03/03/23  0605 03/04/23  0559   WBC 6.89 7.60 8.40   RBC 3.53* 3.45* 3.53*   HGB 8.6* 8.4* 8.7*   HCT 28.5* 28.0* 29.1*    279 288   MCV 81* 81* 82   MCH 24.4* 24.3* 24.6*   MCHC 30.2* 30.0* 29.9*       BMP:   Recent Labs   Lab 03/03/23  0605 03/04/23  0559 03/05/23  0325   GLU 57* 105 71    139 141   K 3.7 3.2* 3.8   CL 97 97 101   CO2 32* 32* 29   BUN 49* 50* 48*   CREATININE 4.1* 4.2* 4.0*   CALCIUM 8.5* 8.5* 8.5*       CMP:   Recent Labs   Lab  03/03/23  0605 03/04/23  0559 03/05/23  0325   GLU 57* 105 71   CALCIUM 8.5* 8.5* 8.5*    139 141   K 3.7 3.2* 3.8   CO2 32* 32* 29   CL 97 97 101   BUN 49* 50* 48*   CREATININE 4.1* 4.2* 4.0*       Diagnostic Results:  I have reviewed all imagining in the last 24 hours.    Assessment/Plan:     * Acute kidney injury superimposed on CKD  H/O kidney transplant  - UA bland without evidence of hydronephrosis of renal graft noted with ultrasound, defer renal biopsy at this time  - trasnsplant kidney 2x, first one was in 1992 and second one was 2005 patient on Prograf 2.5 mg BID and prednisone 5 mg PO daily as outpatient   - KATINA on CKD (baseline creatinine ~2.6-2.8), most consistent with progressive failing kidney transplant however not currently amendable to RRT should that time arise for which Palliative Medicine consulted and to follow-up outpatient   - can continue Lasix 80 mg PO BID, continue calcium acetate 1,334 mg TIDWM and paricalitol 1 mcg daily for now  - to follow-up with patient's nephrologist as outpatient (Dr. Latif)    Hypertension  - management per primary  - currently on hydralazine 50 mg TID, Toprol-XL 25 mg daily, nifedipine 60 mg BID in addition to doxazosin 4 mg Q12H    Long-term use of immunosuppressant medication  - outpatient regimen consists of tacrolimus 2.5 mg BID and prednisone 5 mg daily  - will continue with tacrolimus 2 mg BID and prednisone 5 mg daily  - goal Prograf trough (4-6)  - to follow-up with patient's nephrologist as outpatient (Dr. Latif)    Víctor Enamorado MD  Kidney Transplant  [facility] - Intensive Care ([facility]-)

## 2023-03-06 NOTE — SUBJECTIVE & OBJECTIVE
"Interval History:  Pt renal transplant x 2.     Past Medical History:   Diagnosis Date    (HFpEF) heart failure with preserved ejection fraction 9/25/2017    Atrial fibrillation     CAD (coronary artery disease)     CHF (congestive heart failure)     Chronic diastolic heart failure 4/8/2020    CKD (chronic kidney disease) stage 3, GFR 30-59 ml/min     Dr. Latif    CKD (chronic kidney disease) stage 3, GFR 30-59 ml/min     Diverticulosis     Fever blister     Heart attack 2006    Hyperlipidemia     Hypertension     Immunodeficiency due to treatment with immunosuppressive medication     Keloid cicatrix     Left lower quadrant abdominal pain 6/5/2022    Metabolic bone disease     Pericarditis     S/P kidney transplant     Supraventricular tachycardia 06/2019    Thrombocytopenia     Thyroid disease     Tobacco use 9/5/2014    Unspecified disorder of kidney and ureter     Stage IIICKD       Past Surgical History:   Procedure Laterality Date    CARDIOVERSION  04/30/15    CHOLECYSTECTOMY      COLONOSCOPY  April 20, 2011    Diverticulosis, repeat recommended in 3 yrs., repeat colonoscopy 2014 revealed 2 polyps.  He should return in 5 years.    COLONOSCOPY N/A 3/13/2020    Procedure: COLONOSCOPY;  Surgeon: Chon Casper MD;  Location: [facility] (4TH FLR);  Service: Endoscopy;  Laterality: N/A;  ok to hold coumadin x5days-see telephone encounter 2/4/20-tb    COLONOSCOPY N/A 2/4/2021    Procedure: COLONOSCOPY;  Surgeon: Chon Casper MD;  Location: [facility] (4TH FLR);  Service: Endoscopy;  Laterality: N/A;  Eliquis - per Dr. GAVIN Blunt ok to hold x 2 days and "restarted asap"- ERW  last prep "poor", [id number] ordered/ Pt requests Dr. Casper  prep ins. mailed - COVID screening on 2/1/21 PCW- ERW    COLONOSCOPY N/A 3/3/2021    Procedure: COLONOSCOPY;  Surgeon: Chon Casper MD;  Location: [facility] (4TH FLR);  Service: Endoscopy;  Laterality: N/A;  Patient with his second poor bowel pre and has poor prep today.  If " patient not intersted or can't get colonoscopy tomorrow will need constipation bowel prep and will need to restart his Eliquis today.Thanks,Chon     per Dr Blunt-ok to hold Eliquis 2 days prior      COVID     CORONARY ANGIOPLASTY WITH STENT PLACEMENT  9/13/2006    KIDNEY TRANSPLANT  2005    PARATHYROIDECTOMY      TREATMENT OF CARDIAC ARRHYTHMIA N/A 3/28/2022    Procedure: CARDIOVERSION;  Surgeon: Migue Blunt MD;  Location: [facility] LAB;  Service: Cardiology;  Laterality: N/A;  AF, DCC, MAC, GP, 3 PREP*RODRIGO deferred pt 100% compliant*       Review of patient's allergies indicates:   Allergen Reactions    Ace inhibitors Swelling    Verapamil Other (See Comments)     Other reaction(s): Unknown    Povidone-iodine Itching       Medications:  Continuous Infusions:      Scheduled Meds:   acetaminophen  1,000 mg Oral TID    albuterol-ipratropium  3 mL Nebulization Q4H WAKE    apixaban  5 mg Oral BID    aspirin  81 mg Oral Daily    atorvastatin  40 mg Oral QHS    calcium acetate(phosphat bind)  1,334 mg Oral TID WM    doxazosin  4 mg Oral Q12H    epoetin samara (PROCRIT) injection  20,000 Units Subcutaneous Q7 Days    famotidine  20 mg Oral Daily    fluticasone furoate-vilanteroL  1 puff Inhalation Daily    furosemide  80 mg Oral BID    gabapentin  100 mg Oral QHS    hydrALAZINE  50 mg Oral TID    metoprolol succinate  25 mg Oral Daily    NIFEdipine  60 mg Oral BID    OLANZapine  10 mg Oral QHS    paricalcitoL  1 mcg Oral Daily    predniSONE  5 mg Oral Daily    tacrolimus  2 mg Oral BID     PRN Meds:albuterol, albuterol-ipratropium, clonazePAM, melatonin, OLANZapine, traMADoL    Family History       Problem Relation (Age of Onset)    Heart disease Father    Kidney disease Sister, Brother    Kidney failure Sister, Brother    No Known Problems Sister, Brother, Sister, Sister    Thyroid disease Mother          Tobacco Use    Smoking status: Former     Packs/day: 0.50     Years: 40.00     Pack years: 20.00     Types: Cigarettes      Quit date: 2022     Years since quittin.0    Smokeless tobacco: Never    Tobacco comments:     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     Patient is currently retired   Substance and Sexual Activity    Alcohol use: No    Drug use: No    Sexual activity: Yes     Partners: Female       Review of Systems   Constitutional:  Positive for activity change and fatigue.   Respiratory:  Positive for shortness of breath.    Gastrointestinal:  Negative for abdominal distention.   Neurological:  Positive for weakness.   Psychiatric/Behavioral:  Positive for decreased concentration.    Objective:     Vital Signs (Most Recent):  Temp: 98.5 °F (36.9 °C) (23 0740)  Pulse: 92 (23 0756)  Resp: 20 (23 0756)  BP: (!) 144/68 (23 0740)  SpO2: 96 % (23 07)   Vital Signs (24h Range):  Temp:  [97.6 °F (36.4 °C)-99 °F (37.2 °C)] 98.5 °F (36.9 °C)  Pulse:  [74-98] 92  Resp:  [16-20] 20  SpO2:  [90 %-96 %] 96 %  BP: (125-162)/(61-74) 144/68     Weight: 98.4 kg (216 lb 14.9 oz)  Body mass index is 28.62 kg/m².    Physical Exam  Constitutional:       General: He is not in acute distress.     Appearance: He is well-developed.      Comments: Sitter cam at bedside   HENT:      Head: Normocephalic and atraumatic.   Eyes:      General: Lids are normal.      Conjunctiva/sclera: Conjunctivae normal.   Cardiovascular:      Rate and Rhythm: Normal rate and regular rhythm.   Pulmonary:      Effort: Pulmonary effort is normal. No respiratory distress.   Abdominal:      Tenderness: There is no abdominal tenderness.   Musculoskeletal:      Cervical back: Full passive range of motion without pain and normal range of motion.      Comments: Normal ROM   Skin:     General: Skin is warm and dry.   Neurological:      Mental Status: He is alert and oriented to person, place, and time.   Psychiatric:         Attention and Perception: He is inattentive.       Review of  Symptoms      Symptom Assessment (ESAS 0-10 Scale)  Pain:  0  Dyspnea:  0  Anxiety:  0  Nausea:  0  Depression:  0  Anorexia:  0  Fatigue:  0  Insomnia:  0  Restlessness:  0  Agitation:  0         Performance Status:  60    Living Arrangements:  Lives with family    Psychosocial/Cultural:   See Palliative Psychosocial Note: No  Pt  to spouse.  Pt had renal transplant x 2. Pt wife has been caregiver.   **Primary  to Follow**  Palliative Care  Consult: No      Advance Care Planning   Advance Directives:   Living Will: No    Do Not Resuscitate Status: No    Medical Power of : No    Agent's Name:  Pt's wife is NOK    Decision Making:  Patient answered questions and Family answered questions  Goals of Care: What is most important right now is to focus on spending time at home, remaining as independent as possible, symptom/pain control, quality of life, even if it means sacrificing a little time. Accordingly, we have decided that the best plan to meet the patient's goals includes continuing with treatment.       Significant Labs: All pertinent labs within the past 24 hours have been reviewed.  CBC:   Recent Labs   Lab 03/04/23  0559   WBC 8.40   HGB 8.7*   HCT 29.1*   MCV 82          BMP:  Recent Labs   Lab 03/06/23  0822   GLU 79      K 3.9      CO2 27   BUN 46*   CREATININE 4.1*   CALCIUM 8.7       LFT:  Lab Results   Component Value Date    AST 25 02/27/2023    ALKPHOS 78 02/27/2023    BILITOT 0.4 02/27/2023     Albumin:   Albumin   Date Value Ref Range Status   03/06/2023 2.3 (L) 3.5 - 5.2 g/dL Final     Protein:   Total Protein   Date Value Ref Range Status   02/27/2023 7.3 6.0 - 8.4 g/dL Final     Lactic acid:   Lab Results   Component Value Date    LACTATE 1.5 02/22/2023    LACTATE 1.9 02/20/2023       Significant Imaging: I have reviewed all pertinent imaging results/findings within the past 24 hours.

## 2023-03-06 NOTE — PLAN OF CARE
Patient ambulated approx 12 ft with walker with no assistance. Pt education on catheter care. AVS to be reviewed prior to discharge.   Problem: Adult Inpatient Plan of Care  Goal: Plan of Care Review  Outcome: Adequate for Care Transition  Goal: Patient-Specific Goal (Individualized)  Outcome: Adequate for Care Transition  Goal: Absence of Hospital-Acquired Illness or Injury  Outcome: Adequate for Care Transition  Goal: Optimal Comfort and Wellbeing  Outcome: Adequate for Care Transition  Goal: Readiness for Transition of Care  Outcome: Adequate for Care Transition     Problem: Fluid and Electrolyte Imbalance (Acute Kidney Injury/Impairment)  Goal: Fluid and Electrolyte Balance  Outcome: Adequate for Care Transition     Problem: Oral Intake Inadequate (Acute Kidney Injury/Impairment)  Goal: Optimal Nutrition Intake  Outcome: Adequate for Care Transition     Problem: Renal Function Impairment (Acute Kidney Injury/Impairment)  Goal: Effective Renal Function  Outcome: Adequate for Care Transition     Problem: Infection  Goal: Absence of Infection Signs and Symptoms  Outcome: Adequate for Care Transition     Problem: Fall Injury Risk  Goal: Absence of Fall and Fall-Related Injury  Outcome: Adequate for Care Transition     Problem: Coping Ineffective  Goal: Effective Coping  Outcome: Adequate for Care Transition

## 2023-03-06 NOTE — ASSESSMENT & PLAN NOTE
- outpatient regimen consists of tacrolimus 2.5 mg BID and prednisone 5 mg daily  - will continue with tacrolimus 2 mg BID and prednisone 5 mg daily  - goal Prograf trough (4-6)  - to follow-up with patient's nephrologist as outpatient (Dr. Latif)

## 2023-03-06 NOTE — ASSESSMENT & PLAN NOTE
- UA bland without evidence of hydronephrosis of renal graft noted with ultrasound, defer renal biopsy at this time  - trasnsplant kidney 2x, first one was in 1992 and second one was 2005 patient on Prograf 2.5 mg BID and prednisone 5 mg PO daily as outpatient   - KATINA on CKD (baseline creatinine ~2.6-2.8), most consistent with progressive failing kidney transplant however not currently amendable to RRT should that time arise for which Palliative Medicine consulted and to follow-up outpatient   - can continue Lasix 80 mg PO BID, continue calcium acetate 1,334 mg TIDWM and paricalitol 1 mcg daily for now  - to follow-up with patient's nephrologist as outpatient (Dr. Latif)

## 2023-03-06 NOTE — PLAN OF CARE
Spoke with pt and wife at bedside discussed SNF vs home with HH he states he is not going to SNF he is going home wife states their daughter and ALEXA will assist getting him in the house wife feels that the biggest issue with getting up is the gout and that once he gets home and get some more of the medication in and the juice he drinks for the gout he will be much better, discussed the option of SNF from home if needed, they would like HH wife is going to call insurance about copay explained that the HH will be able to tell her if she has a copay, did encourage her to contact [facility] about other benefits the pt may have such as transport to appointments, care manager ect. Pt and wife states he will go home with HH with therapy and nursing

## 2023-03-06 NOTE — SUBJECTIVE & OBJECTIVE
Subjective:   History of Present Illness:  67 year old man with past medical history of kidney transplant times 2 first one in 1992 and then again in 2005. Has CKD with baseline creatinine high 2s. Recently admitted earlier this month with COVID. Was admitted with KATINA with cr 3.8 on admit. Last admission when he was admitted with COVID his cr did peak at 3.8 but when he was on 2/10 his cr was 2.6 at baseline. Patient denies any diarrhea after discharge from hospital. Says that he has a hard time urinating but no dysuria, no hematuria , no flank pain , no fevers. Was on keflex for mild cellulitis. Denies NSAID use. This morning patient was wheezing and had some SOB. He was started on IVF NS with no improvement in scr this morning was 3.8    Mr. Phelps is a 67 y.o. year old male who is status post Kidney Transplant - 4/12/2005 - Not Followed  (#2).    His maintenance immunosuppression consists of:   Immunosuppressants (From admission, onward)      Start     Stop Route Frequency Ordered    03/01/23 1800  tacrolimus capsule 2 mg         -- Oral 2 times daily 03/01/23 1151          Interval History: Patient seen and examined this AM. Wife at bedside. No acute events overnight. Afebrile with pulse ranging from 90-70s bpm. Systolic blood pressures ranging from 160-130s mmHg. He is saturating +90% on room air with documented UOP of 1.35 liters with two unmeasured voids in the last 24 hours. BUN 46 and creatinine 4.1 largely stable over past several days. Tacrolimus trough this morning 3.6 (~11.5 hours). Primary team with plans to discharge today. To follow-up outpatient with his nephrologist and Palliative Medicine.    Past Medical, Surgical, Family, and Social History:   Unchanged from H&P.    Scheduled Meds:   acetaminophen  1,000 mg Oral TID    albuterol-ipratropium  3 mL Nebulization Q4H WAKE    apixaban  5 mg Oral BID    aspirin  81 mg Oral Daily    atorvastatin  40 mg Oral QHS    calcium acetate(phosphat bind)  1,334  "mg Oral TID WM    doxazosin  4 mg Oral Q12H    epoetin samara (PROCRIT) injection  20,000 Units Subcutaneous Q7 Days    famotidine  20 mg Oral Daily    fluticasone furoate-vilanteroL  1 puff Inhalation Daily    furosemide  80 mg Oral BID    gabapentin  100 mg Oral QHS    hydrALAZINE  50 mg Oral TID    metoprolol succinate  25 mg Oral Daily    NIFEdipine  60 mg Oral BID    OLANZapine  10 mg Oral QHS    paricalcitoL  1 mcg Oral Daily    predniSONE  5 mg Oral Daily    tacrolimus  2 mg Oral BID     Continuous Infusions:      PRN Meds:albuterol, albuterol-ipratropium, clonazePAM, melatonin, OLANZapine, traMADoL    Intake/Output - Last 3 Shifts         03/04 0700 03/05 0659 03/05 0700 03/06 0659 03/06 0700 03/07 0659    P.O. 600 120     Total Intake(mL/kg) 600 (5.4) 120 (1.2)     Urine (mL/kg/hr) 1525 (0.6) 1350 (0.6) 500 (4.2)    Emesis/NG output  0     Other 0 0     Stool 0 0     Blood  0     Total Output 1525 1350 500    Net -925 -1230 -500           Urine Occurrence 1 x 2 x     Stool Occurrence 0 x 1 x     Emesis Occurrence  0 x            Objective:     Vital Signs (Most Recent):  Temp: 98.5 °F (36.9 °C) (03/06/23 0740)  Pulse: 92 (03/06/23 0756)  Resp: 20 (03/06/23 0756)  BP: (!) 144/68 (03/06/23 0740)  SpO2: 96 % (03/06/23 0740)   Vital Signs (24h Range):  Temp:  [97.6 °F (36.4 °C)-99 °F (37.2 °C)] 98.5 °F (36.9 °C)  Pulse:  [74-98] 92  Resp:  [16-20] 20  SpO2:  [90 %-96 %] 96 %  BP: (125-162)/(61-74) 144/68     Weight: 98.4 kg (216 lb 14.9 oz)  Height: 6' 1" (185.4 cm)  Body mass index is 28.62 kg/m².    Physical Exam  Vitals and nursing note reviewed.   Constitutional:       General: He is awake. He is not in acute distress.     Appearance: He is well-developed. He is obese. He is ill-appearing. He is not diaphoretic.   HENT:      Head: Normocephalic and atraumatic.      Right Ear: External ear normal.      Left Ear: External ear normal.      Nose: Nose normal.      Mouth/Throat:      Comments: Hoarseness " noted.  Eyes:      General: No scleral icterus.        Right eye: No discharge.         Left eye: No discharge.      Extraocular Movements: Extraocular movements intact.      Conjunctiva/sclera: Conjunctivae normal.   Neck:      Vascular: No JVD.   Cardiovascular:      Rate and Rhythm: Normal rate.      Heart sounds: No murmur heard.    No friction rub. No gallop.   Pulmonary:      Effort: Pulmonary effort is normal. No respiratory distress.      Breath sounds: No wheezing, rhonchi or rales.      Comments: Diminished breath sounds bilaterally.  Abdominal:      General: Bowel sounds are normal. There is no distension.      Palpations: Abdomen is soft.      Tenderness: There is no abdominal tenderness.   Musculoskeletal:      Cervical back: Neck supple.      Right lower leg: No edema.      Left lower leg: No edema.   Skin:     General: Skin is warm and dry.      Coloration: Skin is not jaundiced.   Neurological:      Mental Status: He is alert. Mental status is at baseline.      Cranial Nerves: No cranial nerve deficit.      Motor: Weakness present.   Psychiatric:         Mood and Affect: Mood normal.         Behavior: Behavior normal. Behavior is cooperative.       Laboratory:  CBC:   Recent Labs   Lab 03/02/23  0453 03/03/23  0605 03/04/23  0559   WBC 6.89 7.60 8.40   RBC 3.53* 3.45* 3.53*   HGB 8.6* 8.4* 8.7*   HCT 28.5* 28.0* 29.1*    279 288   MCV 81* 81* 82   MCH 24.4* 24.3* 24.6*   MCHC 30.2* 30.0* 29.9*       BMP:   Recent Labs   Lab 03/03/23  0605 03/04/23  0559 03/05/23  0325   GLU 57* 105 71    139 141   K 3.7 3.2* 3.8   CL 97 97 101   CO2 32* 32* 29   BUN 49* 50* 48*   CREATININE 4.1* 4.2* 4.0*   CALCIUM 8.5* 8.5* 8.5*       CMP:   Recent Labs   Lab 03/03/23  0605 03/04/23  0559 03/05/23  0325   GLU 57* 105 71   CALCIUM 8.5* 8.5* 8.5*    139 141   K 3.7 3.2* 3.8   CO2 32* 32* 29   CL 97 97 101   BUN 49* 50* 48*   CREATININE 4.1* 4.2* 4.0*       Diagnostic Results:  I have reviewed all  imagining in the last 24 hours.

## 2023-03-06 NOTE — PLAN OF CARE
[facility] cannot accept referral sent to [facility] they will see if they are in network, barrier is the insurance and the area pt lives in has limited coverage

## 2023-03-06 NOTE — PROGRESS NOTES
[facility] ([place] [id number])  Palliative Medicine  Progress Note    Patient Name: Ibrahima Phelps  MRN: [id number]  Admission Date: 2/20/2023  Hospital Length of Stay: 14 days  Code Status: Full Code   Attending Provider: Russ Vizcaino MD  Consulting Provider: TRUDI Garzon  Primary Care Physician: Connie Magdaleno MD  Principal Problem:Acute kidney injury superimposed on CKD    Patient information was obtained from patient, spouse/SO and primary team.      Assessment/Plan:     Palliative Care  Palliative care encounter  Impression: Pt is a 66 y/o male with renal transplant who was admitted for acute on chronic kidney injury. Pt had kidney transplant times 2- first one in 1992 and then again in 2005. Pt is alert, oriented to person, place, and situation for most part. Pt has sitter cam at bedside. Pt is a full code.     Reason for consult: GOC/ACP. Pt has KATINA on CKD. Pt does not want dialysis if needed in future.     Goals of care/ACP:    Today: Spoke to pt and pt's wife. Plan is to d/c home today. Pt to follow-up in virtual visit with Dr. Booth. Pt and wife are aware when he kidney function gets to the point he would need dialysis, he would not want dialysis and plan would then become comfort/QOL.    3/1/23  Met with pt and pt's wife. Pt and wife aware pt having acute kidney injury on CKD. Per pt, he would never want to go back on dialysis if needed in future. Pt's wife is aware. Pt concerned his wishes may not be honored.  Explained to pt and wife, that is pt's goal is not to have dialysis, his wishes would be honored. Discussed with pt and wife, in future if pt needs dialysis and pt does not want it, focus would change to comfort/QOL. Hospice care discussed.     At this time, plan is to continue medical management. Pal care will follow along   MPOA: Pt's wife is NOK.     Symptom management:     Debility:   PT/Ot seeing pt.   SNF recommended.     Delirium  Pt is alert and oriented to person,  place, and situation for most part.   - started depakote. Weaned down to depakote 250 mg nightly   - delirium precautions    Pt appears comfortable. No signs of distress noted.     Plan:  Will schedule to f/u in palliative care clinic                    I will follow-up with patient. Please contact us if you have any additional questions.    Subjective:     Chief Complaint:   Chief Complaint   Patient presents with    Shortness of Breath     Reports home O2 sat 88% COVID + 13 days ago.       HPI:   Pt is a 67-year-old black male transplant kidney patient being admitted for KATINA.  Patient was just discharged from a hospitalization for COVID pneumonia on 02/10/2023.  He also paid in ED visit a few days ago for what could have been a mild cellulitis/folliculitis on his foot for which he is almost done with his Keflex regimen.      Per chart review, patient was instructed to come to the ED this time by the COVID surveillance nurses due to desaturation down to 88%.  When I talked with the patient and his wife in detail, they affirmed that he was not having any new or worsening shortness of breath since his discharge.  His wife did report that once the patient woke up/sat up his desaturation spontaneously resolved.  Patient denies any steven current shortness of breath or chest pain.  No nausea vomiting or flu fever like feeling.  In my discussion with the ED provider who was noted that the patient was complaining of shortness of breath but there was a concern that this likely was anxiety triggered.  Patient seems to be saturating well on room air here in the ED and chest x-ray which I personally reviewed is clear bilaterally.  Wife reports patient has been compliant with his medications.    The blood work did demonstrate an acutely elevated creatinine of 3.8 whereas his baseline       Hospital Course:  No notes on file    Interval History:  Pt renal transplant x 2.     Past Medical History:   Diagnosis Date    (HFpEF)  "heart failure with preserved ejection fraction 9/25/2017    Atrial fibrillation     CAD (coronary artery disease)     CHF (congestive heart failure)     Chronic diastolic heart failure 4/8/2020    CKD (chronic kidney disease) stage 3, GFR 30-59 ml/min     Dr. Latif    CKD (chronic kidney disease) stage 3, GFR 30-59 ml/min     Diverticulosis     Fever blister     Heart attack 2006    Hyperlipidemia     Hypertension     Immunodeficiency due to treatment with immunosuppressive medication     Keloid cicatrix     Left lower quadrant abdominal pain 6/5/2022    Metabolic bone disease     Pericarditis     S/P kidney transplant     Supraventricular tachycardia 06/2019    Thrombocytopenia     Thyroid disease     Tobacco use 9/5/2014    Unspecified disorder of kidney and ureter     Stage IIICKD       Past Surgical History:   Procedure Laterality Date    CARDIOVERSION  04/30/15    CHOLECYSTECTOMY      COLONOSCOPY  April 20, 2011    Diverticulosis, repeat recommended in 3 yrs., repeat colonoscopy 2014 revealed 2 polyps.  He should return in 5 years.    COLONOSCOPY N/A 3/13/2020    Procedure: COLONOSCOPY;  Surgeon: Chon Casper MD;  Location: [facility] (4TH FLR);  Service: Endoscopy;  Laterality: N/A;  ok to hold coumadin x5days-see telephone encounter 2/4/20-tb    COLONOSCOPY N/A 2/4/2021    Procedure: COLONOSCOPY;  Surgeon: Chon Casper MD;  Location: [facility] (4TH FLR);  Service: Endoscopy;  Laterality: N/A;  Eliquis - per Dr. GAVIN Blunt ok to hold x 2 days and "restarted asap"- ERW  last prep "poor", [id number] ordered/ Pt requests Dr. Casper  prep ins. mailed - COVID screening on 2/1/21 PCW- ERW    COLONOSCOPY N/A 3/3/2021    Procedure: COLONOSCOPY;  Surgeon: Chon Casper MD;  Location: [facility] (4TH FLR);  Service: Endoscopy;  Laterality: N/A;  Patient with his second poor bowel pre and has poor prep today.  If patient not intersted or can't get colonoscopy tomorrow will need " constipation bowel prep and will need to restart his Eliquis today.Thanks,Chon     per Dr Blunt-ok to hold Eliquis 2 days prior      COVID     CORONARY ANGIOPLASTY WITH STENT PLACEMENT  9/13/2006    KIDNEY TRANSPLANT  2005    PARATHYROIDECTOMY      TREATMENT OF CARDIAC ARRHYTHMIA N/A 3/28/2022    Procedure: CARDIOVERSION;  Surgeon: Migue Blunt MD;  Location: [facility] EP LAB;  Service: Cardiology;  Laterality: N/A;  AF, DCC, MAC, GP, 3 PREP*RODRIGO deferred pt 100% compliant*       Review of patient's allergies indicates:   Allergen Reactions    Ace inhibitors Swelling    Verapamil Other (See Comments)     Other reaction(s): Unknown    Povidone-iodine Itching       Medications:  Continuous Infusions:      Scheduled Meds:   acetaminophen  1,000 mg Oral TID    albuterol-ipratropium  3 mL Nebulization Q4H WAKE    apixaban  5 mg Oral BID    aspirin  81 mg Oral Daily    atorvastatin  40 mg Oral QHS    calcium acetate(phosphat bind)  1,334 mg Oral TID WM    doxazosin  4 mg Oral Q12H    epoetin samara (PROCRIT) injection  20,000 Units Subcutaneous Q7 Days    famotidine  20 mg Oral Daily    fluticasone furoate-vilanteroL  1 puff Inhalation Daily    furosemide  80 mg Oral BID    gabapentin  100 mg Oral QHS    hydrALAZINE  50 mg Oral TID    metoprolol succinate  25 mg Oral Daily    NIFEdipine  60 mg Oral BID    OLANZapine  10 mg Oral QHS    paricalcitoL  1 mcg Oral Daily    predniSONE  5 mg Oral Daily    tacrolimus  2 mg Oral BID     PRN Meds:albuterol, albuterol-ipratropium, clonazePAM, melatonin, OLANZapine, traMADoL    Family History       Problem Relation (Age of Onset)    Heart disease Father    Kidney disease Sister, Brother    Kidney failure Sister, Brother    No Known Problems Sister, Brother, Sister, Sister    Thyroid disease Mother          Tobacco Use    Smoking status: Former     Packs/day: 0.50     Years: 40.00     Pack years: 20.00     Types: Cigarettes     Quit date: 2/28/2022     Years  since quittin.0    Smokeless tobacco: Never    Tobacco comments:     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     Patient is currently retired   Substance and Sexual Activity    Alcohol use: No    Drug use: No    Sexual activity: Yes     Partners: Female       Review of Systems   Constitutional:  Positive for activity change and fatigue.   Respiratory:  Positive for shortness of breath.    Gastrointestinal:  Negative for abdominal distention.   Neurological:  Positive for weakness.   Psychiatric/Behavioral:  Positive for decreased concentration.    Objective:     Vital Signs (Most Recent):  Temp: 98.5 °F (36.9 °C) (23 0740)  Pulse: 92 (23 0756)  Resp: 20 (23 0756)  BP: (!) 144/68 (23 0740)  SpO2: 96 % (23 0740)   Vital Signs (24h Range):  Temp:  [97.6 °F (36.4 °C)-99 °F (37.2 °C)] 98.5 °F (36.9 °C)  Pulse:  [74-98] 92  Resp:  [16-20] 20  SpO2:  [90 %-96 %] 96 %  BP: (125-162)/(61-74) 144/68     Weight: 98.4 kg (216 lb 14.9 oz)  Body mass index is 28.62 kg/m².    Physical Exam  Constitutional:       General: He is not in acute distress.     Appearance: He is well-developed.      Comments: Sitter cam at bedside   HENT:      Head: Normocephalic and atraumatic.   Eyes:      General: Lids are normal.      Conjunctiva/sclera: Conjunctivae normal.   Cardiovascular:      Rate and Rhythm: Normal rate and regular rhythm.   Pulmonary:      Effort: Pulmonary effort is normal. No respiratory distress.   Abdominal:      Tenderness: There is no abdominal tenderness.   Musculoskeletal:      Cervical back: Full passive range of motion without pain and normal range of motion.      Comments: Normal ROM   Skin:     General: Skin is warm and dry.   Neurological:      Mental Status: He is alert and oriented to person, place, and time.   Psychiatric:         Attention and Perception: He is inattentive.       Review of Symptoms      Symptom Assessment (ESAS 0-10  Scale)  Pain:  0  Dyspnea:  0  Anxiety:  0  Nausea:  0  Depression:  0  Anorexia:  0  Fatigue:  0  Insomnia:  0  Restlessness:  0  Agitation:  0         Performance Status:  60    Living Arrangements:  Lives with family    Psychosocial/Cultural:   See Palliative Psychosocial Note: No  Pt  to spouse.  Pt had renal transplant x 2. Pt wife has been caregiver.   **Primary  to Follow**  Palliative Care  Consult: No      Advance Care Planning   Advance Directives:   Living Will: No    Do Not Resuscitate Status: No    Medical Power of : No    Agent's Name:  Pt's wife is NOK    Decision Making:  Patient answered questions and Family answered questions  Goals of Care: What is most important right now is to focus on spending time at home, remaining as independent as possible, symptom/pain control, quality of life, even if it means sacrificing a little time. Accordingly, we have decided that the best plan to meet the patient's goals includes continuing with treatment.       Significant Labs: All pertinent labs within the past 24 hours have been reviewed.  CBC:   Recent Labs   Lab 03/04/23  0559   WBC 8.40   HGB 8.7*   HCT 29.1*   MCV 82          BMP:  Recent Labs   Lab 03/06/23  0822   GLU 79      K 3.9      CO2 27   BUN 46*   CREATININE 4.1*   CALCIUM 8.7       LFT:  Lab Results   Component Value Date    AST 25 02/27/2023    ALKPHOS 78 02/27/2023    BILITOT 0.4 02/27/2023     Albumin:   Albumin   Date Value Ref Range Status   03/06/2023 2.3 (L) 3.5 - 5.2 g/dL Final     Protein:   Total Protein   Date Value Ref Range Status   02/27/2023 7.3 6.0 - 8.4 g/dL Final     Lactic acid:   Lab Results   Component Value Date    LACTATE 1.5 02/22/2023    LACTATE 1.9 02/20/2023       Significant Imaging: I have reviewed all pertinent imaging results/findings within the past 24 hours.      > 50% of 50 min visit spent in chart review, face to face discussion of goals of care,   symptom assessment, coordination of care and emotional support    Lindsey Skelton, CNS  Palliative Medicine  [facility] - [facility] ([place]-[facility])

## 2023-03-06 NOTE — PT/OT/SLP PROGRESS
Occupational Therapy   Treatment    Name: Ibrahima Phelps  MRN: [id number]  Admitting Diagnosis:  Acute kidney injury superimposed on CKD       Recommendations:     Discharge Recommendations: nursing facility, skilled  Discharge Equipment Recommendations:  bedside commode, walker, rolling, shower chair  Barriers to discharge:  None    Assessment:     Ibrahima Phelps is a 67 y.o. male with a medical diagnosis of Acute kidney injury superimposed on CKD.  He presents with increased gout related pain in R greater toe. Pt managed bed mobility with increased time / caution for R toe. Pt demonstrated persistent posterior lean at EOB with 1 episode of LOB. Pt donned new gown at EOB with increased postural support. Pt t/f to bedside chair with MaX (A) 2/2 pain. Pt educated on the importance of AROM exercises and benefit of SNF placement with current level of function. Performance deficits affecting function are impaired endurance, impaired self care skills, weakness, impaired functional mobility, gait instability, pain, impaired balance.     Rehab Prognosis:  Good; patient would benefit from acute skilled OT services to address these deficits and reach maximum level of function.       Plan:     Patient to be seen 3 x/week to address the above listed problems via self-care/home management, neuromuscular re-education, therapeutic activities, therapeutic exercises  Plan of Care Expires: 03/26/23  Plan of Care Reviewed with: patient, spouse    Subjective     Pain/Comfort:  Pain Rating 1: other (see comments) (nOT RATED)  Location - Side 1: Right  Location 1: foot  Pain Addressed 1: Reposition, Distraction    Objective:     Communicated with: nurse prior to session.  Patient found HOB elevated with telemetry, pearson catheter upon OT entry to room.    General Precautions: Standard, fall    Orthopedic Precautions:N/A  Braces: N/A  Respiratory Status: Room air     Occupational Performance:     Bed Mobility:    Patient completed  Rolling/Turning to Left with  moderate assistance  Patient completed Rolling/Turning to Right with moderate assistance  Patient completed Supine to Sit with maximal assistance     Functional Mobility/Transfers:  Patient completed Bed <> Chair Transfer using Squat Pivot technique with maximal assistance with hand-held assist  Functional Mobility: N/A 2/2 pain    Activities of Daily Living:  Upper Body Dressing: minimum assistance Don gown with postural support  Lower Body Dressing: moderate assistance Don slippers       AMPAC 6 Click ADL: 17    Treatment & Education:  Pt educated on role of OT/POC  Pt. Educated on safety precautions   Pt provided self-care/ADL re-education  Pt reviewed bed mobility/functional mobility    Patient left up in chair with all lines intact, call button in reach, and nurse notified    GOALS:   Multidisciplinary Problems       Occupational Therapy Goals          Problem: Occupational Therapy    Goal Priority Disciplines Outcome Interventions   Occupational Therapy Goal     OT, PT/OT Ongoing, Progressing    Description: Goals to be met by: 03/13     Patient will increase functional independence with ADLs by performing:    UE Dressing with Stand-by Assistance.  LE Dressing with Minimal Assistance.  Grooming while standing at sink with Contact Guard Assistance.  Toilet transfer to toilet with Contact Guard Assistance.                         Time Tracking:     OT Date of Treatment: 03/06/23  OT Start Time: 0833  OT Stop Time: 0857  OT Total Time (min): 24 min    Billable Minutes:Self Care/Home Management 24    OT/CONRAD: OT     CONRAD Visit Number: 0    3/6/2023

## 2023-03-06 NOTE — PLAN OF CARE
Problem: Adult Inpatient Plan of Care  Goal: Plan of Care Review  Outcome: Ongoing, Progressing     Problem: Adult Inpatient Plan of Care  Goal: Patient-Specific Goal (Individualized)  Outcome: Ongoing, Progressing     Problem: Infection  Goal: Absence of Infection Signs and Symptoms  Outcome: Ongoing, Progressing     Problem: Fall Injury Risk  Goal: Absence of Fall and Fall-Related Injury  Outcome: Ongoing, Progressing     Problem: Coping Ineffective  Goal: Effective Coping  Outcome: Ongoing, Progressing   Patient AAOX2 no acute distress noted VSS RA denies any complaints of pain at this time fall precaution maintained no injuries noted throughout this shift safety measures maintained will cont to monitor.

## 2023-03-06 NOTE — ASSESSMENT & PLAN NOTE
Impression: Pt is a 66 y/o male with renal transplant who was admitted for acute on chronic kidney injury. Pt had kidney transplant times 2- first one in 1992 and then again in 2005. Pt is alert, oriented to person, place, and situation for most part. Pt has sitter cam at bedside. Pt is a full code.     Reason for consult: GOC/ACP. Pt has KATINA on CKD. Pt does not want dialysis if needed in future.     Goals of care/ACP:    Today: Spoke to pt and pt's wife. Plan is to d/c home today. Pt to follow-up in virtual visit with Dr. Booth. Pt and wife are aware when he kidney function gets to the point he would need dialysis, he would not want dialysis and plan would then become comfort/QOL.    3/1/23  Met with pt and pt's wife. Pt and wife aware pt having acute kidney injury on CKD. Per pt, he would never want to go back on dialysis if needed in future. Pt's wife is aware. Pt concerned his wishes may not be honored.  Explained to pt and wife, that is pt's goal is not to have dialysis, his wishes would be honored. Discussed with pt and wife, in future if pt needs dialysis and pt does not want it, focus would change to comfort/QOL. Hospice care discussed.     At this time, plan is to continue medical management. Pal care will follow along   MPOA: Pt's wife is NOK.     Symptom management:     Debility:   PT/Ot seeing pt.   SNF recommended.     Delirium  Pt is alert and oriented to person, place, and situation for most part.   - started depakote. Weaned down to depakote 250 mg nightly   - delirium precautions    Pt appears comfortable. No signs of distress noted.     Plan:  Will schedule to f/u in palliative care clinic

## 2023-03-06 NOTE — PLAN OF CARE
[facility] ([place] [address])      HOME HEALTH ORDERS  FACE TO FACE ENCOUNTER    Patient Name: Ibrahima Phelps  YOB: 1955    PCP: Connie Magdaleno MD   PCP Address: [address] / [place] [address]  PCP Phone Number: [phone]  PCP Fax: [phone]    Encounter Date: 2/20/23    Admit to Home Health    Diagnoses:  Active Hospital Problems    Diagnosis  POA    *Acute kidney injury superimposed on CKD [N17.9, N18.9]  Yes     Priority: 1 - High    H/O kidney transplant [Z94.0]  Not Applicable     Priority: 2     Chronic dyspnea [R06.09]  Yes     Priority: 3     Palliative care encounter [Z51.5]  Not Applicable    Advance care planning [Z71.89]  Not Applicable    Right ankle pain [M25.571]  No    Paroxysmal atrial fibrillation [I48.0]  Yes    Hypertension [I10]  Yes    CAD (coronary artery disease) [I25.10]  Yes     Negative stress echo 2009  ? Hx of pericarditis      Long-term use of immunosuppressant medication [Z79.60]  Not Applicable      Resolved Hospital Problems    Diagnosis Date Resolved POA    Goals of care, counseling/discussion [Z71.89] 03/03/2023 Not Applicable    Acute hypoxemic respiratory failure [J96.01] 03/02/2023 Yes    Delirium [R41.0] 03/06/2023 No    Hypoxia [R09.02] 03/02/2023 Yes    Wheezing [R06.2] 03/02/2023 Yes    Anemia of renal disease [N18.9, D63.1] 03/02/2023 Yes    Cellulitis of foot [L03.119] 03/02/2023 Yes    Hypocalcemia [E83.51] 03/02/2023 Yes    Debility [R53.81] 03/02/2023 No    Anemia due to stage 4 chronic kidney disease [N18.4, D63.1] 03/02/2023 Yes    Acute on chronic diastolic heart failure [I50.33] 03/06/2023 No     TTE (2/9/23):  The left ventricle is normal in size with normal systolic function. The estimated ejection fraction is 65%.  Normal right ventricular size with normal right ventricular systolic function.  Grade II left ventricular diastolic dysfunction.  Severe left atrial enlargement.  There is mild aortic valve stenosis. Aortic  valve area is 1.8 cm2; peak velocity is 2.9 m/s; mean gradient is 15 mmHg.  Pulmonary HTN - the estimated PA systolic pressure is > 41mm Hg. (The IVC was not well visualized.)             Follow Up Appointments:  Future Appointments   Date Time Provider Department Center   3/9/2023  8:30 AM HAO Thomas II, MD [facility] IM [address] PCW   3/13/2023  2:30 PM PRE-ADMIT, ENDO -[facility] ENDOPP4 [facility]   6/20/2023  9:40 AM Marcos Brunson MD [facility] PAM EPI [address]       Allergies:  Review of patient's allergies indicates:   Allergen Reactions    Ace inhibitors Swelling    Verapamil Other (See Comments)     Other reaction(s): Unknown    Povidone-iodine Itching       Medications: Review discharge medications with patient and family and provide education.    Current Facility-Administered Medications   Medication Dose Route Frequency Provider Last Rate Last Admin    acetaminophen tablet 1,000 mg  1,000 mg Oral TID Morris Payan MD   1,000 mg at 03/06/23 0815    albuterol inhaler 2 puff  2 puff Inhalation Q6H PRN Russ Vizcaino MD   2 puff at 02/26/23 1110    albuterol-ipratropium 2.5 mg-0.5 mg/3 mL nebulizer solution 3 mL  3 mL Nebulization Q4H WAKE Morris Payan MD   3 mL at 03/06/23 0756    albuterol-ipratropium 2.5 mg-0.5 mg/3 mL nebulizer solution 3 mL  3 mL Nebulization Q4H PRN Morris Payan MD        apixaban tablet 5 mg  5 mg Oral BID Morris Payan MD   5 mg at 03/06/23 0818    aspirin EC tablet 81 mg  81 mg Oral Daily Russ Vizcaino MD   81 mg at 03/06/23 0816    atorvastatin tablet 40 mg  40 mg Oral QHS Russ Vizcaino MD   40 mg at 03/05/23 2104    calcium acetate(phosphat bind) capsule 1,334 mg  1,334 mg Oral TID  Morris Payan MD   1,334 mg at 03/06/23 1229    clonazePAM tablet 0.5 mg  0.5 mg Oral BID PRN Russ Vizcaino MD   0.5 mg at 03/01/23 2100    colchicine capsule/tablet 0.6 mg  0.6 mg Oral Daily Russ Vizcaino MD   0.6 mg at 03/06/23 1228     doxazosin tablet 4 mg  4 mg Oral Q12H Russ Vizcaino MD   4 mg at 03/06/23 1017    epoetin samara injection 20,000 Units  20,000 Units Subcutaneous Q7 Days Morris Payan MD   20,000 Units at 03/05/23 1121    famotidine tablet 20 mg  20 mg Oral Daily Russ Vizcaino MD   20 mg at 03/06/23 0816    fluticasone furoate-vilanteroL 100-25 mcg/dose diskus inhaler 1 puff  1 puff Inhalation Daily Russ Vizcaino MD   1 puff at 03/06/23 0757    furosemide tablet 80 mg  80 mg Oral BID Russ Vizcaino MD   80 mg at 03/06/23 0816    gabapentin capsule 100 mg  100 mg Oral QHS Morris Payan MD   100 mg at 03/05/23 2104    hydrALAZINE tablet 50 mg  50 mg Oral TID Russ Vizcaino MD   50 mg at 03/06/23 0816    melatonin tablet 6 mg  6 mg Oral Nightly PRN Jesica Acevedo MD   6 mg at 03/05/23 2105    metoprolol succinate (TOPROL-XL) 24 hr split tablet 25 mg  25 mg Oral Daily Russ Vizcaino MD   25 mg at 03/06/23 0817    NIFEdipine 24 hr tablet 60 mg  60 mg Oral BID Russ Vizcaino MD   60 mg at 03/06/23 0817    OLANZapine injection 2.5 mg  2.5 mg Intramuscular Once PRN Russ Vizcaino MD        OLANZapine tablet 10 mg  10 mg Oral QHS Russ Vizcaino MD   10 mg at 03/05/23 2104    paricalcitoL capsule 1 mcg  1 mcg Oral Daily Russ Vizcaino MD   1 mcg at 03/06/23 0830    predniSONE tablet 5 mg  5 mg Oral Daily Russ Vizcaino MD   5 mg at 03/06/23 0817    tacrolimus capsule 2 mg  2 mg Oral BID Amy Bryant MD   2 mg at 03/06/23 0819    tramadol split tablet 25 mg  25 mg Oral Q6H PRN Morris Payan MD   25 mg at 03/05/23 1856     Current Discharge Medication List        CONTINUE these medications which have CHANGED    Details   furosemide (LASIX) 80 MG tablet Take 1 tablet (80 mg total) by mouth 2 (two) times daily.  Qty: 60 tablet, Refills: 0      gabapentin (NEURONTIN) 100 MG capsule Take 1 capsule (100 mg total) by mouth every evening.  Qty: 30 capsule, Refills: 0      tacrolimus  (PROGRAF) 1 MG Cap Take 2 capsules (2 mg total) by mouth every morning AND 2 capsules (2 mg total) every evening.  Qty: 270 capsule, Refills: 3    Associated Diagnoses: Kidney replaced by transplant           CONTINUE these medications which have NOT CHANGED    Details   acetaminophen (TYLENOL) 500 MG tablet Take 1 tablet (500 mg total) by mouth every 6 (six) hours as needed for Pain.  Qty: 20 tablet, Refills: 0      albuterol (PROVENTIL) 2.5 mg /3 mL (0.083 %) nebulizer solution Take 3 mLs (2.5 mg total) by nebulization every 6 (six) hours as needed for Wheezing. Rescue  Qty: 9 mL, Refills: 6      albuterol (VENTOLIN HFA) 90 mcg/actuation inhaler Inhale 2 puffs into the lungs every 6 (six) hours as needed for Wheezing or Shortness of Breath. Rescue  Qty: 18 g, Refills: 3    Associated Diagnoses: Other emphysema      ammonium lactate 12 % Crea Apply 1 g topically once daily.  Qty: 140 g, Refills: 1      aspirin (ECOTRIN) 81 MG EC tablet Take 1 tablet by mouth Daily.      atorvastatin (LIPITOR) 10 MG tablet Take 10 mg by mouth.      calcium carbonate (CALCIUM 600 ORAL) Take by mouth. Pt taking 1200 daily      colchicine (MITIGARE) 0.6 mg Cap One po BID prn gout flare up to 3 days  Qty: 15 capsule, Refills: 11      doxazosin (CARDURA) 4 MG tablet TAKE 1 TABLET EVERY 12 HOURS  Qty: 180 tablet, Refills: 3    Associated Diagnoses: Hypertensive heart and renal disease with renal failure, stage 1 through stage 4 or unspecified chronic kidney disease, with heart failure      ELIQUIS 5 mg Tab TAKE 1 TABLET BY MOUTH TWICE A DAY  Qty: 180 tablet, Refills: 3      famotidine (PEPCID) 20 MG tablet TAKE 1 TABLET TWICE DAILY  Qty: 180 tablet, Refills: 3      fluticasone propion-salmeterol 115-21 mcg/dose (ADVAIR HFA) 115-21 mcg/actuation HFAA inhaler Inhale 2 puffs into the lungs every 12 (twelve) hours. Controller  Qty: 12 g, Refills: 5    Associated Diagnoses: Other emphysema      fluticasone propionate (FLONASE) 50 mcg/actuation  nasal spray 1 spray (50 mcg total) by Each Nostril route once daily.  Qty: 18.2 mL, Refills: 0      hydrALAZINE (APRESOLINE) 50 MG tablet TAKE 1 TABLET (50 MG TOTAL) BY MOUTH 3 (THREE) TIMES DAILY.  Qty: 270 tablet, Refills: 3      LIDOcaine (LIDODERM) 5 % Place 1 patch onto the skin daily as needed (back pain). Remove & Discard patch within 12 hours or as directed by MD  Qty: 15 patch, Refills: 0      metoprolol succinate (TOPROL-XL) 25 MG 24 hr tablet Take 1 tablet (25 mg total) by mouth once daily.  Qty: 90 tablet, Refills: 3    Comments: .  Associated Diagnoses: Chronic diastolic heart failure      multivitamin (THERAGRAN) per tablet Take 1 tablet by mouth Daily.      NIFEdipine (PROCARDIA-XL) 60 MG (OSM) 24 hr tablet Take 1 tablet (60 mg total) by mouth 2 (two) times a day.  Qty: 14 tablet, Refills: 0    Comments: .  Associated Diagnoses: Hypertensive heart and renal disease with renal failure, stage 1 through stage 4 or unspecified chronic kidney disease, with heart failure      omeprazole (PRILOSEC) 20 MG capsule Take 1 capsule (20 mg total) by mouth once daily.  Qty: 14 capsule, Refills: 0      paricalcitoL (ZEMPLAR) 1 MCG capsule TAKE 1 CAPSULE ON MONDAY, WEDNESDAY AND FRIDAY  Qty: 36 capsule, Refills: 3      predniSONE (DELTASONE) 5 MG tablet TAKE 1 TABLET BY MOUTH EVERY DAY IN THE MORNING  Qty: 90 tablet, Refills: 3      pulse oximeter (PULSE OXIMETER) device by Apply Externally route 2 (two) times a day. Use twice daily at 8 AM and 3 PM and [facility] as directed.  Qty: 1 each, Refills: 0    Comments: This is a NO CHARGE item.  Please override price to zero.  DO NOT PRINT.  NORMAL MODE e-PRESCRIBE ONLY.      vitamin D 1000 units Tab Take 370 mg by mouth 2 (two) times daily. 2 tablets BID           STOP taking these medications       cephALEXin (KEFLEX) 500 MG capsule Comments:   Reason for Stopping:         clonazePAM (KLONOPIN) 0.5 MG tablet Comments:   Reason for Stopping:          cyclobenzaprine (FLEXERIL) 5 MG tablet Comments:   Reason for Stopping:         fexofenadine (ALLEGRA) 60 MG tablet Comments:   Reason for Stopping:         flecainide (TAMBOCOR) 50 MG Tab Comments:   Reason for Stopping:         hyoscyamine (LEVSIN/SL) 0.125 mg Subl Comments:   Reason for Stopping:         potassium chloride (KLOR-CON 10) 10 MEQ TbSR Comments:   Reason for Stopping:                 I have seen and examined this patient within the last 30 days. My clinical findings that support the need for the home health skilled services and home bound status are the following:no   Mental confusion making it unsafe for patient to leave home alone due to  occasional encephalopathy due to renal disease; as well as medication compliance and inability to safely leave home..         Activities:   activity as tolerated    Nursing:   Agency to admit patient within 24 hours of hospital discharge unless specified on physician order or at patient request. Assist in managing Hamilton catheter.    SN to complete comprehensive assessment including routine vital signs. Instruct on disease process and s/s of complications to report to MD. Review/verify medication list sent home with the patient at time of discharge  and instruct patient/caregiver as needed. Frequency may be adjusted depending on start of care date.     Skilled nurse to perform up to 3 visits PRN for symptoms related to diagnosis        Ok to schedule additional visits based on staff availability and patient request on consecutive days within the home health episode.    When multiple disciplines ordered:    Start of Care occurs on Sunday - Wednesday schedule remaining discipline evaluations as ordered on separate consecutive days following the start of care.    Thursday SOC -schedule subsequent evaluations Friday and Monday the following week.     Friday - Saturday SOC - schedule subsequent discipline evaluations on consecutive days starting Monday of the following  week.    For all post-discharge communication and subsequent orders please contact patient's primary care physician.     Home Health Aide:  Nursing Three times weekly, Physical Therapy Three times weekly, and Home Health Aide Three times weekly        I certify that this patient is confined to his home and needs intermittent skilled nursing care.

## 2023-03-07 NOTE — DISCHARGE SUMMARY
[facility] - Intensive Care ([place] [address])  [facility] Medicine  Discharge Summary      Patient Name: Ibrahima Phelps  MRN: [id number]  SHAVONNE: [id number]  Patient Class: IP- Inpatient  Admission Date: 2/20/2023  Hospital Length of Stay: 14 days  Discharge Date and Time: 3/6/2023  5:55 PM  Attending Physician: No att. providers found   Discharging Provider: Russ Vizcaino MD  Primary Care Provider: Connie Magdaleno MD  [facility] Medicine Team: [facility] Russ Vizcaino MD  Primary Care Team: [facility]    HPI:   67-year-old black male transplant kidney patient being admitted for KATINA.  Patient was just discharged from a hospitalization for COVID pneumonia on 02/10/2023.  He also paid in ED visit a few days ago for what could have been a mild cellulitis/folliculitis on his foot for which he is almost done with his Keflex regimen.    Patient was instructed to come to the ED this time by the COVID surveillance nurses due to desaturation down to 88%.  When I talked with the patient and his wife in detail, they affirmed that he was not having any new or worsening shortness of breath since his discharge.  His wife did report that once the patient woke up/sat up his desaturation spontaneously resolved.  Patient denies any steven current shortness of breath or chest pain.  No nausea vomiting or flu fever like feeling.  In my discussion with the ED provider who was noted that the patient was complaining of shortness of breath but there was a concern that this likely was anxiety triggered.  Patient seems to be saturating well on room air here in the ED and chest x-ray which I personally reviewed is clear bilaterally.  Wife reports patient has been compliant with his medications.    The blood work did demonstrate an acutely elevated creatinine of 3.8 whereas his baseline is in the 2s.      * No surgery found *      Hospital Course:   67-year-old renal transplant black male was admitted for acute on chronic kidney injury.   Patient was just discharged from hospitalization for COVID pneumonia on 02/10/2023.  Presented to the ED per recommendations of the COVID surveillance team.  Did not have any definitive acute respiratory findings to indicate acute ongoing infectious COVID, chest x-ray was clear.  However creatinine was significantly elevated up to 3.8 whereas baseline was in the 2s.  Was started on IV fluids with trivial improvement.  CT chest was obtained due to his persistent unchanged severe dyspnea which showed some small bilateral effusions, not enough to drain via thoracentesis.  Patient was started on diuresis with improvement in shortness of breath.  Creatinine seemed to plateau around 3.9.  Patient did get delirious and was started on Depakote. Switched from GGT to IV lasix; and then switched to po lasix. Cr flatlined around 3.9-4.2. Pt did initially pass voiding trial but had some partial retention upwards of 600+mls. Hamilton was re-inserted to be maintained at discharge to minimize any worsening renal failure from any obstructive uropathy with a f/u recommended with urology outpt for a voiding trial.  Patient did have some intermittent delirium including the day of discharge but he was calm and cooperative and following instructions.  He was able to demonstrate ambulation on his own without assist.  There were no acute neuro focal deficits throughout his hospital stay including the day of discharge.  Wife verbalized understanding my explanation that his confusion may be intermittent and may actually become more prevalent in the near future due to his worsening renal function. Patient has met maximum benefit from hospitalization and is clinically stable for discharge.      Patient had affirmed during his hospital stay that he will not go on dialysis. Family was connected with palliative Care to follow up with them as an outpatient.   I informed the patient's wife to have the patient come back should there be any concerns of  any stroke-like deficits which she was educated on. Therapy recommended placement but pt/wife affirmed wanting to go home instead.       Exam:   Clear lungs bilaterally, unlabored breathing   Heart sounds indicate a regular rate and rhythm   Awake alert, no acute distress   No facial droop, no slurred speech  Moves all 4 extremities   No obvious lower extremity edema   On room air, no cyanosis  pleasant    Goals of Care Treatment Preferences:  Code Status: Full Code    Health care agent: Pt's wife is NOK  Health care agent number: No value filed.          What is most important right now is to focus on spending time at home, remaining as independent as possible, symptom/pain control, quality of life, even if it means sacrificing a little time.  Accordingly, we have decided that the best plan to meet the patient's goals includes continuing with treatment.      Consults:   Consults (From admission, onward)        Status Ordering Provider     Inpatient consult to Palliative Care  Once        Provider:  (Not yet assigned)    Completed ANA MARIA LYONS     Inpatient consult to Electrophysiology  Once        Provider:  (Not yet assigned)    Completed ANA MARIA LYONS     Inpatient consult to PICC team ([facility])  Once        Provider:  (Not yet assigned)    Completed DENISE BRANDON                  Final Active Diagnoses:    Diagnosis Date Noted POA    PRINCIPAL PROBLEM:  Acute kidney injury superimposed on CKD [N17.9, N18.9] 02/20/2023 Yes    H/O kidney transplant [Z94.0] 07/10/2015 Not Applicable    Chronic dyspnea [R06.09] 02/21/2023 Yes    Palliative care encounter [Z51.5] 03/01/2023 Not Applicable    Advance care planning [Z71.89] 03/01/2023 Not Applicable    Right ankle pain [M25.571] 02/26/2023 No    Paroxysmal atrial fibrillation [I48.0] 09/25/2017 Yes    Hypertension [I10]  Yes    CAD (coronary artery disease) [I25.10]  Yes    Long-term use of immunosuppressant medication [Z79.60]  Not Applicable       Problems Resolved During this Admission:    Diagnosis Date Noted Date Resolved POA    Goals of care, counseling/discussion [Z71.89] 03/01/2023 03/03/2023 Not Applicable    Acute hypoxemic respiratory failure [J96.01] 02/28/2023 03/02/2023 Yes    Delirium [R41.0] 02/28/2023 03/06/2023 No    Hypoxia [R09.02] 02/26/2023 03/02/2023 Yes    Wheezing [R06.2] 02/22/2023 03/02/2023 Yes    Anemia of renal disease [N18.9, D63.1] 02/22/2023 03/02/2023 Yes    Cellulitis of foot [L03.119] 02/20/2023 03/02/2023 Yes    Hypocalcemia [E83.51] 02/08/2023 03/02/2023 Yes    Debility [R53.81] 06/08/2022 03/02/2023 No    Anemia due to stage 4 chronic kidney disease [N18.4, D63.1] 06/05/2022 03/02/2023 Yes    Acute on chronic diastolic heart failure [I50.33] 04/08/2020 03/06/2023 No       Discharged Condition: fair    Disposition: Home or Self Care    Follow Up:   Follow-up Information     Connie Magdaleno MD. Schedule an appointment as soon as possible for a visit in 1 week(s).    Specialty: Internal Medicine  Contact information:  [address]  [place] [address] [phone]             [facility] - Cystoscopy. Schedule an appointment as soon as possible for a visit in 1 week(s).    Specialty: Urology  Why: voiding trial  Contact information:  [address] [facility] [address] [phone]                         Pending Diagnostic Studies:     None         Medications:  Reconciled Home Medications:      Medication List      CHANGE how you take these medications    furosemide 80 MG tablet  Commonly known as: LASIX  Take 1 tablet (80 mg total) by mouth 2 (two) times daily.  What changed:   · medication strength  · how much to take  · when to take this  · reasons to take this     gabapentin 100 MG capsule  Commonly known as: NEURONTIN  Take 1 capsule (100 mg total) by mouth every evening.  What changed:   · medication strength  · how much to take  · when to take this     tacrolimus 1 MG Cap  Commonly  known as: PROGRAF  Take 2 capsules (2 mg total) by mouth every morning AND 2 capsules (2 mg total) every evening.  What changed:   · See the new instructions.  · Another medication with the same name was removed. Continue taking this medication, and follow the directions you see here.        CONTINUE taking these medications    acetaminophen 500 MG tablet  Commonly known as: TYLENOL  Take 1 tablet (500 mg total) by mouth every 6 (six) hours as needed for Pain.     ADVAIR -21 mcg/actuation Hfaa inhaler  Generic drug: fluticasone propion-salmeterol 115-21 mcg/dose  Inhale 2 puffs into the lungs every 12 (twelve) hours. Controller     * albuterol 90 mcg/actuation inhaler  Commonly known as: VENTOLIN HFA  Inhale 2 puffs into the lungs every 6 (six) hours as needed for Wheezing or Shortness of Breath. Rescue     * albuterol 2.5 mg /3 mL (0.083 %) nebulizer solution  Commonly known as: PROVENTIL  Take 3 mLs (2.5 mg total) by nebulization every 6 (six) hours as needed for Wheezing. Rescue     ammonium lactate 12 % Crea  Apply 1 g topically once daily.     aspirin 81 MG EC tablet  Commonly known as: ECOTRIN  Take 1 tablet by mouth Daily.     atorvastatin 10 MG tablet  Commonly known as: LIPITOR  Take 10 mg by mouth.     CALCIUM 600 ORAL  Take by mouth. Pt taking 1200 daily     colchicine 0.6 mg Cap  Commonly known as: MITIGARE  One po BID prn gout flare up to 3 days     doxazosin 4 MG tablet  Commonly known as: CARDURA  TAKE 1 TABLET EVERY 12 HOURS     ELIQUIS 5 mg Tab  Generic drug: apixaban  TAKE 1 TABLET BY MOUTH TWICE A DAY     famotidine 20 MG tablet  Commonly known as: PEPCID  TAKE 1 TABLET TWICE DAILY     fluticasone propionate 50 mcg/actuation nasal spray  Commonly known as: FLONASE  1 spray (50 mcg total) by Each Nostril route once daily.     hydrALAZINE 50 MG tablet  Commonly known as: APRESOLINE  TAKE 1 TABLET (50 MG TOTAL) BY MOUTH 3 (THREE) TIMES DAILY.     LIDOcaine 5 %  Commonly known as:  LIDODERM  Place 1 patch onto the skin daily as needed (back pain). Remove & Discard patch within 12 hours or as directed by MD     metoprolol succinate 25 MG 24 hr tablet  Commonly known as: TOPROL-XL  Take 1 tablet (25 mg total) by mouth once daily.     multivitamin per tablet  Commonly known as: THERAGRAN  Take 1 tablet by mouth Daily.     NIFEdipine 60 MG (OSM) 24 hr tablet  Commonly known as: PROCARDIA-XL  Take 1 tablet (60 mg total) by mouth 2 (two) times a day.     omeprazole 20 MG capsule  Commonly known as: PRILOSEC  Take 1 capsule (20 mg total) by mouth once daily.     paricalcitoL 1 MCG capsule  Commonly known as: ZEMPLAR  TAKE 1 CAPSULE ON MONDAY, WEDNESDAY AND FRIDAY     predniSONE 5 MG tablet  Commonly known as: DELTASONE  TAKE 1 TABLET BY MOUTH EVERY DAY IN THE MORNING     pulse oximeter device  Commonly known as: pulse oximeter  by Apply Externally route 2 (two) times a day. Use twice daily at 8 AM and 3 PM and record the value in [employer] as directed.     vitamin D 1000 units Tab  Commonly known as: VITAMIN D3  Take 370 mg by mouth 2 (two) times daily. 2 tablets BID         * This list has 2 medication(s) that are the same as other medications prescribed for you. Read the directions carefully, and ask your doctor or other care provider to review them with you.            STOP taking these medications    cephALEXin 500 MG capsule  Commonly known as: KEFLEX     clonazePAM 0.5 MG tablet  Commonly known as: KlonoPIN     cyclobenzaprine 5 MG tablet  Commonly known as: FLEXERIL     fexofenadine 60 MG tablet  Commonly known as: ALLEGRA     flecainide 50 MG Tab  Commonly known as: TAMBOCOR     hyoscyamine 0.125 mg Subl  Commonly known as: LEVSIN/SL     potassium chloride 10 MEQ Tbsr  Commonly known as: KLOR-CON 10            Indwelling Lines/Drains at time of discharge:   Lines/Drains/Airways     Drain  Duration                Urethral Catheter 03/04/23 1326 Straight-tip 12 Fr. 2 days                Time  spent on the discharge of patient: 32 min minutes         Russ Vizcaino MD  Department of Hospital Medicine  [facility] - Intensive Care ([place]

## 2023-03-07 NOTE — PHYSICIAN QUERY
"PT Name: Ibrahima Phelps  MR #: [id number]    DOCUMENTATION CLARIFICATION     CDS/: Anna Marie Cheema RN CDI           Contact information: [email or web address]   This form is a permanent document in the medical record.    Query Date: March 7, 2023                                                    Removing query - Transplant has stated, " Progressive failing kidney transplant"    There is no conflicting documentation in the notes for this diagnosis.      By submitting this query, we are merely seeking further clarification of documentation.  Please utilize your independent clinical judgment when addressing the question(s) below.    The Medical Record reflects the following:    Clinical Information Location in Medical Record   Acute kidney injury superimposed on CKD  H/O kidney transplant  - UA bland without evidence of hydronephrosis of renal graft noted with ultrasound, defer renal biopsy at this time  - trasnsplant kidney 2x, first one was in 1992 and second one was 2005 patient on Prograf 2.5 mg BID and prednisone 5 mg PO daily as outpatient   - KATINA on CKD (baseline creatinine ~2.6-2.8), most consistent with progressive failing kidney transplant however not currently amendable to RRT should that time arise for which Palliative Medicine consulted and to follow-up outpatient   - can continue Lasix 80 mg PO BID, continue calcium acetate 1,334 mg TIDWM and paricalitol 1 mcg daily for now  - to follow-up with patient's nephrologist as outpatient (Dr. Latif)       Long-term use of immunosuppressant medication  - outpatient regimen consists of tacrolimus 2.5 mg BID and prednisone 5 mg daily  - will continue with tacrolimus 2 mg BID and prednisone 5 mg daily  - goal Prograf trough (4-6)  - to follow-up with patient's nephrologist as outpatient (Dr. Latif)   Transplant Kidney 3/6 JORY Enamorado MD/ SHIRLEY Colin MD       Please clarify/confirm the Consultants diagnosis of "Progressive failing kidney transplant":     [  ] " Diagnosis ruled in     [  ] Diagnosis ruled out     [  ] Other diagnosis (please specify): _____________________________     [  ] Clinically undetermined

## 2023-03-07 NOTE — PLAN OF CARE
SE TAMIKO DALY unable to accept pt they are not in his [employer] policy network, other referrals sent from list on [employer] site using pt policy number

## 2023-03-08 ENCOUNTER — PATIENT MESSAGE (OUTPATIENT)
Dept: NEPHROLOGY | Facility: CLINIC | Age: 68
End: 2023-03-08
Payer: MEDICARE

## 2023-03-08 ENCOUNTER — PATIENT MESSAGE (OUTPATIENT)
Dept: INTERNAL MEDICINE | Facility: CLINIC | Age: 68
End: 2023-03-08
Payer: MEDICARE

## 2023-03-08 DIAGNOSIS — N18.4 CKD (CHRONIC KIDNEY DISEASE) STAGE 4, GFR 15-29 ML/MIN: Primary | ICD-10-CM

## 2023-03-08 DIAGNOSIS — I48.0 PAROXYSMAL ATRIAL FIBRILLATION: Primary | ICD-10-CM

## 2023-03-08 DIAGNOSIS — I10 PRIMARY HYPERTENSION: ICD-10-CM

## 2023-03-08 DIAGNOSIS — N18.4 CKD (CHRONIC KIDNEY DISEASE) STAGE 4, GFR 15-29 ML/MIN: ICD-10-CM

## 2023-03-09 ENCOUNTER — OUTPATIENT CASE MANAGEMENT (OUTPATIENT)
Dept: ADMINISTRATIVE | Facility: OTHER | Age: 68
End: 2023-03-09
Payer: MEDICARE

## 2023-03-09 NOTE — LETTER
March 16, 2023    Ibrahima Phelps  [address] [place] [address]             [facility] [address] [address] Dear Dayan Ibrahima Phelps,    I work with [facility]'s Outpatient Case Management Department. We received a referral to call you to discuss your medical history. These services are free of charge and are offered to [facility] patients who have recently been discharged from any of our facilities or who have complex medical conditions that may require the skill of a nurse to assist with management.    I am a Registered Nurse who specializes in connecting patients with available medical and financial resources as well as addressing any educational needs that may be indicated.    I attempted to reach you by telephone, but I was unsuccessful. Please call our department so that we can go over some questions with you regarding your health.    The Outpatient Case Management Department can be reached at [phone] from 8:00 AM to 4:30 PM on Monday through Friday. [facility] also has a program where a nurse is available 24/7 to answer questions or provide medical advice. Their number is [phone].      Kind Regards,    Elisabeth Laughlin RN  Outpatient Case Management  [phone]

## 2023-03-10 ENCOUNTER — PATIENT MESSAGE (OUTPATIENT)
Dept: NEPHROLOGY | Facility: CLINIC | Age: 68
End: 2023-03-10
Payer: MEDICARE

## 2023-03-13 ENCOUNTER — PATIENT MESSAGE (OUTPATIENT)
Dept: INTERNAL MEDICINE | Facility: CLINIC | Age: 68
End: 2023-03-13
Payer: MEDICARE

## 2023-03-14 ENCOUNTER — PATIENT MESSAGE (OUTPATIENT)
Dept: INTERNAL MEDICINE | Facility: CLINIC | Age: 68
End: 2023-03-14
Payer: MEDICARE

## 2023-03-15 ENCOUNTER — PATIENT MESSAGE (OUTPATIENT)
Dept: INTERNAL MEDICINE | Facility: CLINIC | Age: 68
End: 2023-03-15
Payer: MEDICARE

## 2023-03-15 ENCOUNTER — PES CALL (OUTPATIENT)
Dept: ADMINISTRATIVE | Facility: OTHER | Age: 68
End: 2023-03-15
Payer: MEDICARE

## 2023-03-15 NOTE — TELEPHONE ENCOUNTER
Left message to call back, we was just checking on pt and wanted to know if he is still in the hospital and if not do he still want appt today.

## 2023-03-16 ENCOUNTER — HOSPITAL ENCOUNTER (OUTPATIENT)
Facility: HOSPITAL | Age: 68
Discharge: HOME | End: 2023-03-18
Attending: STUDENT IN AN ORGANIZED HEALTH CARE EDUCATION/TRAINING PROGRAM | Admitting: HOSPITALIST
Payer: MEDICARE

## 2023-03-16 ENCOUNTER — PATIENT MESSAGE (OUTPATIENT)
Dept: ADMINISTRATIVE | Facility: OTHER | Age: 68
End: 2023-03-16
Payer: MEDICARE

## 2023-03-16 ENCOUNTER — PATIENT MESSAGE (OUTPATIENT)
Dept: INTERNAL MEDICINE | Facility: CLINIC | Age: 68
End: 2023-03-16
Payer: MEDICARE

## 2023-03-16 DIAGNOSIS — R00.0 TACHYCARDIA: ICD-10-CM

## 2023-03-16 DIAGNOSIS — N39.0 URINARY TRACT INFECTION WITHOUT HEMATURIA, SITE UNSPECIFIED: Primary | ICD-10-CM

## 2023-03-16 DIAGNOSIS — Z94.0 HISTORY OF KIDNEY TRANSPLANT: ICD-10-CM

## 2023-03-16 DIAGNOSIS — R07.9 CHEST PAIN: ICD-10-CM

## 2023-03-16 DIAGNOSIS — N18.9 CHRONIC KIDNEY DISEASE, UNSPECIFIED CKD STAGE: ICD-10-CM

## 2023-03-16 LAB
ALBUMIN SERPL BCP-MCNC: 2.7 G/DL (ref 3.5–5.2)
ALLENS TEST: ABNORMAL
ALLENS TEST: NORMAL
ALP SERPL-CCNC: 70 U/L (ref 55–135)
ALT SERPL W/O P-5'-P-CCNC: 24 U/L (ref 10–44)
ANION GAP SERPL CALC-SCNC: 14 MMOL/L (ref 8–16)
AST SERPL-CCNC: 28 U/L (ref 10–40)
BACTERIA #/AREA URNS AUTO: ABNORMAL /HPF
BASOPHILS # BLD AUTO: 0.02 K/UL (ref 0–0.2)
BASOPHILS NFR BLD: 0.2 % (ref 0–1.9)
BILIRUB SERPL-MCNC: 0.3 MG/DL (ref 0.1–1)
BILIRUB UR QL STRIP: NEGATIVE
BUN SERPL-MCNC: 39 MG/DL (ref 8–23)
CALCIUM SERPL-MCNC: 8.2 MG/DL (ref 8.7–10.5)
CHLORIDE SERPL-SCNC: 106 MMOL/L (ref 95–110)
CLARITY UR REFRACT.AUTO: ABNORMAL
CO2 SERPL-SCNC: 22 MMOL/L (ref 23–29)
COLOR UR AUTO: YELLOW
CREAT SERPL-MCNC: 3.7 MG/DL (ref 0.5–1.4)
DIFFERENTIAL METHOD: ABNORMAL
EOSINOPHIL # BLD AUTO: 1 K/UL (ref 0–0.5)
EOSINOPHIL NFR BLD: 12.7 % (ref 0–8)
ERYTHROCYTE [DISTWIDTH] IN BLOOD BY AUTOMATED COUNT: 17.3 % (ref 11.5–14.5)
EST. GFR  (NO RACE VARIABLE): 17.1 ML/MIN/1.73 M^2
GLUCOSE SERPL-MCNC: 113 MG/DL (ref 70–110)
GLUCOSE UR QL STRIP: NEGATIVE
HCO3 UR-SCNC: 25.9 MMOL/L (ref 24–28)
HCT VFR BLD AUTO: 33.8 % (ref 40–54)
HGB BLD-MCNC: 9.5 G/DL (ref 14–18)
HGB UR QL STRIP: ABNORMAL
HYALINE CASTS UR QL AUTO: 0 /LPF
IMM GRANULOCYTES # BLD AUTO: 0.03 K/UL (ref 0–0.04)
IMM GRANULOCYTES NFR BLD AUTO: 0.4 % (ref 0–0.5)
KETONES UR QL STRIP: NEGATIVE
LDH SERPL L TO P-CCNC: 1.41 MMOL/L (ref 0.5–2.2)
LEUKOCYTE ESTERASE UR QL STRIP: ABNORMAL
LYMPHOCYTES # BLD AUTO: 1.2 K/UL (ref 1–4.8)
LYMPHOCYTES NFR BLD: 14.7 % (ref 18–48)
MAGNESIUM SERPL-MCNC: 2.2 MG/DL (ref 1.6–2.6)
MCH RBC QN AUTO: 23.8 PG (ref 27–31)
MCHC RBC AUTO-ENTMCNC: 28.1 G/DL (ref 32–36)
MCV RBC AUTO: 85 FL (ref 82–98)
MICROSCOPIC COMMENT: ABNORMAL
MONOCYTES # BLD AUTO: 0.5 K/UL (ref 0.3–1)
MONOCYTES NFR BLD: 5.6 % (ref 4–15)
NEUTROPHILS # BLD AUTO: 5.4 K/UL (ref 1.8–7.7)
NEUTROPHILS NFR BLD: 66.4 % (ref 38–73)
NITRITE UR QL STRIP: NEGATIVE
NON-SQ EPI CELLS #/AREA URNS AUTO: 0 /HPF
NRBC BLD-RTO: 0 /100 WBC
PCO2 BLDA: 35.2 MMHG (ref 35–45)
PH SMN: 7.47 [PH] (ref 7.35–7.45)
PH UR STRIP: 6 [PH] (ref 5–8)
PHOSPHATE SERPL-MCNC: 4.1 MG/DL (ref 2.7–4.5)
PLATELET # BLD AUTO: 356 K/UL (ref 150–450)
PMV BLD AUTO: 11.5 FL (ref 9.2–12.9)
PO2 BLDA: 41 MMHG (ref 40–60)
POC BE: 2 MMOL/L
POC SATURATED O2: 81 % (ref 95–100)
POC TCO2: 27 MMOL/L (ref 24–29)
POTASSIUM SERPL-SCNC: 4.3 MMOL/L (ref 3.5–5.1)
PROT SERPL-MCNC: 8.6 G/DL (ref 6–8.4)
PROT UR QL STRIP: ABNORMAL
RBC # BLD AUTO: 4 M/UL (ref 4.6–6.2)
RBC #/AREA URNS AUTO: >100 /HPF (ref 0–4)
SAMPLE: ABNORMAL
SAMPLE: NORMAL
SITE: ABNORMAL
SITE: NORMAL
SODIUM SERPL-SCNC: 142 MMOL/L (ref 136–145)
SP GR UR STRIP: 1.02 (ref 1–1.03)
SQUAMOUS #/AREA URNS AUTO: 1 /HPF
URN SPEC COLLECT METH UR: ABNORMAL
WBC # BLD AUTO: 8.1 K/UL (ref 3.9–12.7)
WBC #/AREA URNS AUTO: >100 /HPF (ref 0–5)
WBC CLUMPS UR QL AUTO: ABNORMAL

## 2023-03-16 PROCEDURE — 82803 BLOOD GASES ANY COMBINATION: CPT

## 2023-03-16 PROCEDURE — 87086 URINE CULTURE/COLONY COUNT: CPT | Performed by: PHYSICIAN ASSISTANT

## 2023-03-16 PROCEDURE — 93005 ELECTROCARDIOGRAM TRACING: CPT

## 2023-03-16 PROCEDURE — 25000003 PHARM REV CODE 250: Performed by: PHYSICIAN ASSISTANT

## 2023-03-16 PROCEDURE — G0378 HOSPITAL OBSERVATION PER HR: HCPCS

## 2023-03-16 PROCEDURE — 99285 PR EMERGENCY DEPT VISIT,LEVEL V: ICD-10-PCS | Mod: ,,, | Performed by: PHYSICIAN ASSISTANT

## 2023-03-16 PROCEDURE — 96375 TX/PRO/DX INJ NEW DRUG ADDON: CPT | Mod: 59

## 2023-03-16 PROCEDURE — 99285 EMERGENCY DEPT VISIT HI MDM: CPT | Mod: 25

## 2023-03-16 PROCEDURE — 99285 EMERGENCY DEPT VISIT HI MDM: CPT | Mod: ,,, | Performed by: PHYSICIAN ASSISTANT

## 2023-03-16 PROCEDURE — 87040 BLOOD CULTURE FOR BACTERIA: CPT | Performed by: PHYSICIAN ASSISTANT

## 2023-03-16 PROCEDURE — 96365 THER/PROPH/DIAG IV INF INIT: CPT

## 2023-03-16 PROCEDURE — 80053 COMPREHEN METABOLIC PANEL: CPT | Performed by: PHYSICIAN ASSISTANT

## 2023-03-16 PROCEDURE — 93010 ELECTROCARDIOGRAM REPORT: CPT | Mod: ,,, | Performed by: INTERNAL MEDICINE

## 2023-03-16 PROCEDURE — 84100 ASSAY OF PHOSPHORUS: CPT | Performed by: PHYSICIAN ASSISTANT

## 2023-03-16 PROCEDURE — 85025 COMPLETE CBC W/AUTO DIFF WBC: CPT | Performed by: PHYSICIAN ASSISTANT

## 2023-03-16 PROCEDURE — 99900035 HC TECH TIME PER 15 MIN (STAT)

## 2023-03-16 PROCEDURE — 93010 EKG 12-LEAD: ICD-10-PCS | Mod: ,,, | Performed by: INTERNAL MEDICINE

## 2023-03-16 PROCEDURE — 51702 INSERT TEMP BLADDER CATH: CPT

## 2023-03-16 PROCEDURE — 63600175 PHARM REV CODE 636 W HCPCS: Performed by: PHYSICIAN ASSISTANT

## 2023-03-16 PROCEDURE — 87088 URINE BACTERIA CULTURE: CPT | Performed by: PHYSICIAN ASSISTANT

## 2023-03-16 PROCEDURE — 87186 SC STD MICRODIL/AGAR DIL: CPT | Performed by: PHYSICIAN ASSISTANT

## 2023-03-16 PROCEDURE — 83735 ASSAY OF MAGNESIUM: CPT | Performed by: PHYSICIAN ASSISTANT

## 2023-03-16 PROCEDURE — 87077 CULTURE AEROBIC IDENTIFY: CPT | Performed by: PHYSICIAN ASSISTANT

## 2023-03-16 PROCEDURE — 83605 ASSAY OF LACTIC ACID: CPT

## 2023-03-16 PROCEDURE — 81001 URINALYSIS AUTO W/SCOPE: CPT | Performed by: PHYSICIAN ASSISTANT

## 2023-03-16 RX ORDER — FUROSEMIDE 20 MG/1
80 TABLET ORAL 2 TIMES DAILY
Status: DISCONTINUED | OUTPATIENT
Start: 2023-03-17 | End: 2023-03-18 | Stop reason: HOSPADM

## 2023-03-16 RX ORDER — ONDANSETRON 8 MG/1
8 TABLET, ORALLY DISINTEGRATING ORAL EVERY 8 HOURS PRN
Status: DISCONTINUED | OUTPATIENT
Start: 2023-03-17 | End: 2023-03-18 | Stop reason: HOSPADM

## 2023-03-16 RX ORDER — SODIUM CHLORIDE 0.9 % (FLUSH) 0.9 %
10 SYRINGE (ML) INJECTION EVERY 12 HOURS PRN
Status: DISCONTINUED | OUTPATIENT
Start: 2023-03-17 | End: 2023-03-18 | Stop reason: HOSPADM

## 2023-03-16 RX ORDER — ASPIRIN 81 MG/1
81 TABLET ORAL DAILY
Status: DISCONTINUED | OUTPATIENT
Start: 2023-03-17 | End: 2023-03-18 | Stop reason: HOSPADM

## 2023-03-16 RX ORDER — DEXTROSE 40 %
30 GEL (GRAM) ORAL
Status: DISCONTINUED | OUTPATIENT
Start: 2023-03-17 | End: 2023-03-18 | Stop reason: HOSPADM

## 2023-03-16 RX ORDER — HYDRALAZINE HYDROCHLORIDE 25 MG/1
50 TABLET, FILM COATED ORAL 3 TIMES DAILY
Status: DISCONTINUED | OUTPATIENT
Start: 2023-03-17 | End: 2023-03-18 | Stop reason: HOSPADM

## 2023-03-16 RX ORDER — TACROLIMUS 1 MG/1
2 CAPSULE ORAL 2 TIMES DAILY
Status: DISCONTINUED | OUTPATIENT
Start: 2023-03-17 | End: 2023-03-18 | Stop reason: HOSPADM

## 2023-03-16 RX ORDER — DOXAZOSIN 2 MG/1
4 TABLET ORAL DAILY
Status: DISCONTINUED | OUTPATIENT
Start: 2023-03-17 | End: 2023-03-18 | Stop reason: HOSPADM

## 2023-03-16 RX ORDER — PREDNISONE 5 MG/1
5 TABLET ORAL DAILY
Status: DISCONTINUED | OUTPATIENT
Start: 2023-03-17 | End: 2023-03-18 | Stop reason: HOSPADM

## 2023-03-16 RX ORDER — FAMOTIDINE 20 MG/1
20 TABLET, FILM COATED ORAL DAILY
Status: DISCONTINUED | OUTPATIENT
Start: 2023-03-17 | End: 2023-03-18 | Stop reason: HOSPADM

## 2023-03-16 RX ORDER — GLUCAGON 1 MG
1 KIT INJECTION
Status: DISCONTINUED | OUTPATIENT
Start: 2023-03-17 | End: 2023-03-18 | Stop reason: HOSPADM

## 2023-03-16 RX ORDER — ACETAMINOPHEN 325 MG/1
650 TABLET ORAL EVERY 8 HOURS PRN
Status: DISCONTINUED | OUTPATIENT
Start: 2023-03-17 | End: 2023-03-18 | Stop reason: HOSPADM

## 2023-03-16 RX ORDER — POLYETHYLENE GLYCOL 3350 17 G/17G
17 POWDER, FOR SOLUTION ORAL DAILY
Status: DISCONTINUED | OUTPATIENT
Start: 2023-03-17 | End: 2023-03-18 | Stop reason: HOSPADM

## 2023-03-16 RX ORDER — HEPARIN SODIUM 5000 [USP'U]/ML
5000 INJECTION, SOLUTION INTRAVENOUS; SUBCUTANEOUS EVERY 8 HOURS
Status: DISCONTINUED | OUTPATIENT
Start: 2023-03-17 | End: 2023-03-17

## 2023-03-16 RX ORDER — TACROLIMUS 1 MG/1
0.1 CAPSULE ORAL EVERY MORNING
Status: CANCELLED | OUTPATIENT
Start: 2023-03-17

## 2023-03-16 RX ORDER — NALOXONE HCL 0.4 MG/ML
0.02 VIAL (ML) INJECTION
Status: DISCONTINUED | OUTPATIENT
Start: 2023-03-17 | End: 2023-03-18 | Stop reason: HOSPADM

## 2023-03-16 RX ORDER — TALC
6 POWDER (GRAM) TOPICAL NIGHTLY PRN
Status: DISCONTINUED | OUTPATIENT
Start: 2023-03-17 | End: 2023-03-18 | Stop reason: HOSPADM

## 2023-03-16 RX ORDER — MORPHINE SULFATE 2 MG/ML
2 INJECTION, SOLUTION INTRAMUSCULAR; INTRAVENOUS
Status: COMPLETED | OUTPATIENT
Start: 2023-03-16 | End: 2023-03-16

## 2023-03-16 RX ORDER — DEXTROSE 40 %
15 GEL (GRAM) ORAL
Status: DISCONTINUED | OUTPATIENT
Start: 2023-03-17 | End: 2023-03-18 | Stop reason: HOSPADM

## 2023-03-16 RX ORDER — ACETAMINOPHEN 325 MG/1
650 TABLET ORAL EVERY 4 HOURS PRN
Status: DISCONTINUED | OUTPATIENT
Start: 2023-03-17 | End: 2023-03-18 | Stop reason: HOSPADM

## 2023-03-16 RX ORDER — NIFEDIPINE 30 MG/1
60 TABLET, EXTENDED RELEASE ORAL 2 TIMES DAILY
Status: DISCONTINUED | OUTPATIENT
Start: 2023-03-16 | End: 2023-03-18 | Stop reason: HOSPADM

## 2023-03-16 RX ORDER — ATORVASTATIN CALCIUM 10 MG/1
10 TABLET, FILM COATED ORAL DAILY
Status: DISCONTINUED | OUTPATIENT
Start: 2023-03-17 | End: 2023-03-18 | Stop reason: HOSPADM

## 2023-03-16 RX ADMIN — MORPHINE SULFATE 2 MG: 2 INJECTION, SOLUTION INTRAMUSCULAR; INTRAVENOUS at 09:03

## 2023-03-16 RX ADMIN — CEFTRIAXONE 1 G: 1 INJECTION, POWDER, FOR SOLUTION INTRAMUSCULAR; INTRAVENOUS at 09:03

## 2023-03-16 NOTE — PROGRESS NOTES
Outpatient Care Management  Patient Not Qualified    Patient: Ibrahima Phelps  MRN:  [id number]  Date of Service:  3/27/2023  Completed by:  Elisabeth Laughlin RN    Chief Complaint   Patient presents with    OPCM Chart Review     3/9/23    OPCM RN First Assessment Attempt     3/9/23   1st attempt to complete Initial Assessment  for Outpatient Care Management, left message.  Will mail unable to assess letter.       OPCM RN Second Assessment Attempt     3/16/2023  2nd attempt to complete Initial Assessment  for Outpatient Care Management, left message.  Will mail unable to assess letter.       OPCM RN Third Assessment Attempt     3/23/2023  3rd attempt to complete Initial Assessment  for Outpatient Care Management, left message.  Will mail unable to assess letter.       Case Closure     3/27/23       Patient Summary                3/27/23 No response from patient. Notified PCP. Closing case.    3/23/23 3rd attempt to complete initial assessment for Outpatient Care Management: Left message for patient requesting a return call. Letter has been sent to patient with OPCM contact information. Will close case if patient does not respond.     3/16/23 2nd attempt to complete initial assessment for Outpatient Care Management: Unable to leave message. OPCM letter has been sent. Will attempt to contact patient again at a later date.     3/9/23 1st attempt to complete initial assessment for [facility]: Voicemail did not . Other number not in service. Will attempt to contact patient again at a later date.

## 2023-03-17 PROBLEM — N39.0 URINARY TRACT INFECTION ASSOCIATED WITH INDWELLING URETHRAL CATHETER: Status: ACTIVE | Noted: 2023-03-17

## 2023-03-17 PROBLEM — T83.511A URINARY TRACT INFECTION ASSOCIATED WITH INDWELLING URETHRAL CATHETER: Status: ACTIVE | Noted: 2023-03-17

## 2023-03-17 PROBLEM — N18.4 CKD (CHRONIC KIDNEY DISEASE) STAGE 4, GFR 15-29 ML/MIN: Status: ACTIVE | Noted: 2023-03-17

## 2023-03-17 PROBLEM — T45.1X5A IMMUNODEFICIENCY DUE TO TREATMENT WITH IMMUNOSUPPRESSIVE MEDICATION: Status: ACTIVE | Noted: 2023-03-17

## 2023-03-17 PROBLEM — I50.32 CHRONIC DIASTOLIC CHF (CONGESTIVE HEART FAILURE): Status: ACTIVE | Noted: 2023-03-17

## 2023-03-17 PROBLEM — R33.9 URINARY RETENTION: Status: ACTIVE | Noted: 2023-03-17

## 2023-03-17 PROBLEM — Z79.899 IMMUNODEFICIENCY DUE TO TREATMENT WITH IMMUNOSUPPRESSIVE MEDICATION: Status: ACTIVE | Noted: 2023-03-17

## 2023-03-17 PROBLEM — D84.821 IMMUNODEFICIENCY DUE TO TREATMENT WITH IMMUNOSUPPRESSIVE MEDICATION: Status: ACTIVE | Noted: 2023-03-17

## 2023-03-17 PROBLEM — Z79.60 IMMUNODEFICIENCY DUE TO TREATMENT WITH IMMUNOSUPPRESSIVE MEDICATION: Status: ACTIVE | Noted: 2023-03-17

## 2023-03-17 LAB
ALBUMIN SERPL BCP-MCNC: 2.2 G/DL (ref 3.5–5.2)
ALP SERPL-CCNC: 57 U/L (ref 55–135)
ALT SERPL W/O P-5'-P-CCNC: 17 U/L (ref 10–44)
ANION GAP SERPL CALC-SCNC: 9 MMOL/L (ref 8–16)
AST SERPL-CCNC: 21 U/L (ref 10–40)
BASOPHILS # BLD AUTO: 0.02 K/UL (ref 0–0.2)
BASOPHILS NFR BLD: 0.3 % (ref 0–1.9)
BILIRUB SERPL-MCNC: 0.3 MG/DL (ref 0.1–1)
BUN SERPL-MCNC: 37 MG/DL (ref 8–23)
CALCIUM SERPL-MCNC: 7.6 MG/DL (ref 8.7–10.5)
CHLORIDE SERPL-SCNC: 107 MMOL/L (ref 95–110)
CO2 SERPL-SCNC: 24 MMOL/L (ref 23–29)
CREAT SERPL-MCNC: 3.4 MG/DL (ref 0.5–1.4)
DIFFERENTIAL METHOD: ABNORMAL
EOSINOPHIL # BLD AUTO: 1.2 K/UL (ref 0–0.5)
EOSINOPHIL NFR BLD: 15.7 % (ref 0–8)
ERYTHROCYTE [DISTWIDTH] IN BLOOD BY AUTOMATED COUNT: 17.3 % (ref 11.5–14.5)
EST. GFR  (NO RACE VARIABLE): 18.9 ML/MIN/1.73 M^2
GLUCOSE SERPL-MCNC: 73 MG/DL (ref 70–110)
HCT VFR BLD AUTO: 28.1 % (ref 40–54)
HGB BLD-MCNC: 8.1 G/DL (ref 14–18)
IMM GRANULOCYTES # BLD AUTO: 0.04 K/UL (ref 0–0.04)
IMM GRANULOCYTES NFR BLD AUTO: 0.5 % (ref 0–0.5)
LYMPHOCYTES # BLD AUTO: 1.2 K/UL (ref 1–4.8)
LYMPHOCYTES NFR BLD: 15.2 % (ref 18–48)
MAGNESIUM SERPL-MCNC: 2.1 MG/DL (ref 1.6–2.6)
MCH RBC QN AUTO: 24.2 PG (ref 27–31)
MCHC RBC AUTO-ENTMCNC: 28.8 G/DL (ref 32–36)
MCV RBC AUTO: 84 FL (ref 82–98)
MONOCYTES # BLD AUTO: 0.6 K/UL (ref 0.3–1)
MONOCYTES NFR BLD: 7.4 % (ref 4–15)
NEUTROPHILS # BLD AUTO: 4.6 K/UL (ref 1.8–7.7)
NEUTROPHILS NFR BLD: 60.9 % (ref 38–73)
NRBC BLD-RTO: 0 /100 WBC
PHOSPHATE SERPL-MCNC: 4.6 MG/DL (ref 2.7–4.5)
PLATELET # BLD AUTO: 324 K/UL (ref 150–450)
PMV BLD AUTO: 11.9 FL (ref 9.2–12.9)
POTASSIUM SERPL-SCNC: 3.9 MMOL/L (ref 3.5–5.1)
PROT SERPL-MCNC: 6.8 G/DL (ref 6–8.4)
RBC # BLD AUTO: 3.35 M/UL (ref 4.6–6.2)
SODIUM SERPL-SCNC: 140 MMOL/L (ref 136–145)
WBC # BLD AUTO: 7.59 K/UL (ref 3.9–12.7)

## 2023-03-17 PROCEDURE — 25000003 PHARM REV CODE 250

## 2023-03-17 PROCEDURE — G0378 HOSPITAL OBSERVATION PER HR: HCPCS

## 2023-03-17 PROCEDURE — 83735 ASSAY OF MAGNESIUM: CPT

## 2023-03-17 PROCEDURE — 99214 PR OFFICE/OUTPT VISIT, EST, LEVL IV, 30-39 MIN: ICD-10-PCS | Mod: ,,, | Performed by: INTERNAL MEDICINE

## 2023-03-17 PROCEDURE — 99222 PR INITIAL HOSPITAL CARE,LEVL II: ICD-10-PCS | Mod: AI,GC,, | Performed by: HOSPITALIST

## 2023-03-17 PROCEDURE — 63600175 PHARM REV CODE 636 W HCPCS

## 2023-03-17 PROCEDURE — 99214 OFFICE O/P EST MOD 30 MIN: CPT | Mod: ,,, | Performed by: INTERNAL MEDICINE

## 2023-03-17 PROCEDURE — 80053 COMPREHEN METABOLIC PANEL: CPT

## 2023-03-17 PROCEDURE — 85025 COMPLETE CBC W/AUTO DIFF WBC: CPT

## 2023-03-17 PROCEDURE — 36415 COLL VENOUS BLD VENIPUNCTURE: CPT

## 2023-03-17 PROCEDURE — 84100 ASSAY OF PHOSPHORUS: CPT

## 2023-03-17 PROCEDURE — 99222 1ST HOSP IP/OBS MODERATE 55: CPT | Mod: AI,GC,, | Performed by: HOSPITALIST

## 2023-03-17 RX ORDER — MORPHINE SULFATE 4 MG/ML
3 INJECTION, SOLUTION INTRAMUSCULAR; INTRAVENOUS ONCE
Status: COMPLETED | OUTPATIENT
Start: 2023-03-17 | End: 2023-03-17

## 2023-03-17 RX ORDER — OXYCODONE HYDROCHLORIDE 5 MG/1
5 TABLET ORAL EVERY 6 HOURS PRN
Status: DISCONTINUED | OUTPATIENT
Start: 2023-03-17 | End: 2023-03-18 | Stop reason: HOSPADM

## 2023-03-17 RX ADMIN — OXYCODONE HYDROCHLORIDE 5 MG: 5 TABLET ORAL at 09:03

## 2023-03-17 RX ADMIN — ONDANSETRON 8 MG: 8 TABLET, ORALLY DISINTEGRATING ORAL at 11:03

## 2023-03-17 RX ADMIN — TACROLIMUS 2 MG: 1 CAPSULE ORAL at 09:03

## 2023-03-17 RX ADMIN — NIFEDIPINE 60 MG: 30 TABLET, FILM COATED, EXTENDED RELEASE ORAL at 01:03

## 2023-03-17 RX ADMIN — MORPHINE SULFATE 3 MG: 4 INJECTION INTRAVENOUS at 04:03

## 2023-03-17 RX ADMIN — HYDRALAZINE HYDROCHLORIDE 50 MG: 25 TABLET, FILM COATED ORAL at 03:03

## 2023-03-17 RX ADMIN — CEFTRIAXONE 1 G: 1 INJECTION, POWDER, FOR SOLUTION INTRAMUSCULAR; INTRAVENOUS at 10:03

## 2023-03-17 RX ADMIN — OXYCODONE HYDROCHLORIDE 5 MG: 5 TABLET ORAL at 11:03

## 2023-03-17 RX ADMIN — APIXABAN 5 MG: 5 TABLET, FILM COATED ORAL at 09:03

## 2023-03-17 RX ADMIN — ATORVASTATIN CALCIUM 10 MG: 10 TABLET, FILM COATED ORAL at 09:03

## 2023-03-17 RX ADMIN — NIFEDIPINE 60 MG: 30 TABLET, FILM COATED, EXTENDED RELEASE ORAL at 09:03

## 2023-03-17 RX ADMIN — TACROLIMUS 2 MG: 1 CAPSULE ORAL at 05:03

## 2023-03-17 RX ADMIN — ONDANSETRON 8 MG: 8 TABLET, ORALLY DISINTEGRATING ORAL at 05:03

## 2023-03-17 RX ADMIN — POLYETHYLENE GLYCOL 3350 17 G: 17 POWDER, FOR SOLUTION ORAL at 09:03

## 2023-03-17 RX ADMIN — FUROSEMIDE 80 MG: 20 TABLET ORAL at 09:03

## 2023-03-17 RX ADMIN — HYDRALAZINE HYDROCHLORIDE 50 MG: 25 TABLET, FILM COATED ORAL at 09:03

## 2023-03-17 RX ADMIN — Medication 6 MG: at 09:03

## 2023-03-17 RX ADMIN — ASPIRIN 81 MG: 81 TABLET, COATED ORAL at 09:03

## 2023-03-17 RX ADMIN — FAMOTIDINE 20 MG: 20 TABLET ORAL at 09:03

## 2023-03-17 RX ADMIN — METOPROLOL SUCCINATE 25 MG: 25 TABLET, EXTENDED RELEASE ORAL at 09:03

## 2023-03-17 RX ADMIN — DOXAZOSIN 4 MG: 2 TABLET ORAL at 09:03

## 2023-03-17 RX ADMIN — PREDNISONE 5 MG: 5 TABLET ORAL at 09:03

## 2023-03-17 NOTE — ED PROVIDER NOTES
Encounter Date: 3/16/2023       History     Chief Complaint   Patient presents with    Catheter Problem     Patient with pearson cath inserted on 03/05 due to urinary retention. Patient reports he has not had any issues with the catheter until this morning. Patient reports bladder pressure and spasm, but endorses normal urinary output. No blood or clots present in drainage bag.      The history is provided by the patient and medical records. No  was used.     Ibrahima Phelps is a 67 y.o. male with medical history of Kidney Tx (x2 1992, 2005), CKD, CAD, PAFib on Eliquis, Diastolic HF presenting to the ED with the chief complaint of catheter problem.     Recently discharged 10 days ago for KATINA on CKD with urinary retention which required pearson placement. Reports over the last 2 days he has begun having pain in his penile and feeling spasms in his bladder. Reports his pearson has been draining appropriately. Concerned patient has a urinary tract infection. Patient was against going back on HD, but has since changed his mind and open to being back on HD. States he has informed his transplant doctor. Wife notes patient had an episode of LOC a few nights ago since being discharged. EMS was called and he was brought to [facility] ED. Reports being discharged home after work-up. Denies recent antibiotics. Denies fever, chest pain, SOB, abdominal pain, vomiting, bowel movement changes.     Review of patient's allergies indicates:   Allergen Reactions    Ace inhibitors Swelling    Verapamil Other (See Comments)     Other reaction(s): Unknown    Povidone-iodine Itching     Past Medical History:   Diagnosis Date    (HFpEF) heart failure with preserved ejection fraction 9/25/2017    Atrial fibrillation     CAD (coronary artery disease)     CHF (congestive heart failure)     Chronic diastolic heart failure 4/8/2020    CKD (chronic kidney disease) stage 3, GFR 30-59 ml/min     Dr. Latif    CKD (chronic kidney disease)  "stage 3, GFR 30-59 ml/min     Diverticulosis     Fever blister     Heart attack 2006    Hyperlipidemia     Hypertension     Immunodeficiency due to treatment with immunosuppressive medication     Keloid cicatrix     Left lower quadrant abdominal pain 6/5/2022    Metabolic bone disease     Pericarditis     S/P kidney transplant     Supraventricular tachycardia 06/2019    Thrombocytopenia     Thyroid disease     Tobacco use 9/5/2014    Unspecified disorder of kidney and ureter     Stage IIICKD     Past Surgical History:   Procedure Laterality Date    CARDIOVERSION  04/30/15    CHOLECYSTECTOMY      COLONOSCOPY  April 20, 2011    Diverticulosis, repeat recommended in 3 yrs., repeat colonoscopy 2014 revealed 2 polyps.  He should return in 5 years.    COLONOSCOPY N/A 3/13/2020    Procedure: COLONOSCOPY;  Surgeon: Chon Casper MD;  Location: [facility] (4TH FLR);  Service: Endoscopy;  Laterality: N/A;  ok to hold coumadin x5days-see telephone encounter 2/4/20-tb    COLONOSCOPY N/A 2/4/2021    Procedure: COLONOSCOPY;  Surgeon: Chon Casper MD;  Location: [facility] (4TH FLR);  Service: Endoscopy;  Laterality: N/A;  Eliquis - per Dr. GAVIN Blunt ok to hold x 2 days and "restarted asap"- ERW  last prep "poor", [id number] ordered/ Pt requests Dr. Casper  prep ins. mailed - COVID screening on 2/1/21 PCW- ERW    COLONOSCOPY N/A 3/3/2021    Procedure: COLONOSCOPY;  Surgeon: Chon Casper MD;  Location: [facility] (4TH FLR);  Service: Endoscopy;  Laterality: N/A;  Patient with his second poor bowel pre and has poor prep today.  If patient not intersted or can't get colonoscopy tomorrow will need constipation bowel prep and will need to restart his Eliquis today.Thanks,Chon Blunt-ok to hold Eliquis 2 days prior      COVID     CORONARY ANGIOPLASTY WITH STENT PLACEMENT  9/13/2006    KIDNEY TRANSPLANT  2005    PARATHYROIDECTOMY      TREATMENT OF CARDIAC ARRHYTHMIA N/A 3/28/2022    Procedure: CARDIOVERSION;  Surgeon: " Migue Blunt MD;  Location: [facility] EP LAB;  Service: Cardiology;  Laterality: N/A;  AF, DCC, MAC, GP, 3 PREP*RODRIGO deferred pt 100% compliant*     Family History   Problem Relation Age of Onset    No Known Problems Sister     No Known Problems Brother     Thyroid disease Mother         s/p surgery    Heart disease Father         had pacemaker    No Known Problems Sister     Kidney failure Sister     Kidney disease Sister     No Known Problems Sister     Kidney disease Brother     Kidney failure Brother         s/p transplant    Diabetes Mellitus Neg Hx     Stroke Neg Hx     Heart attack Neg Hx     Cancer Neg Hx     Celiac disease Neg Hx     Cirrhosis Neg Hx     Colon cancer Neg Hx     Esophageal cancer Neg Hx     Inflammatory bowel disease Neg Hx     Rectal cancer Neg Hx     Stomach cancer Neg Hx     Ulcerative colitis Neg Hx     Liver disease Neg Hx     Liver cancer Neg Hx     Crohn's disease Neg Hx     Melanoma Neg Hx      Social History     Tobacco Use    Smoking status: Former     Packs/day: 0.50     Years: 40.00     Pack years: 20.00     Types: Cigarettes     Quit date: 2022     Years since quittin.0    Smokeless tobacco: Never    Tobacco comments:     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     Patient is currently retired   Substance Use Topics    Alcohol use: No    Drug use: No     Review of Systems   Genitourinary:  Positive for penile pain.     Physical Exam     Initial Vitals [23 1838]   BP Pulse Resp Temp SpO2   (!) 141/62 (!) 115 19 98.9 °F (37.2 °C) 98 %      MAP       --         Physical Exam    Constitutional: He appears well-developed and well-nourished. He is not diaphoretic. He is easily aroused.   HENT:   Head: Normocephalic and atraumatic.   Mouth/Throat: Oropharynx is clear and moist. No oropharyngeal exudate.   Eyes: EOM and lids are normal. Pupils are equal, round, and reactive to light. No scleral icterus.   Neck: Phonation normal. Neck  supple. No stridor present.   Normal range of motion.  Cardiovascular:  Regular rhythm.   Tachycardia present.         Pulmonary/Chest: Breath sounds normal. No stridor. No respiratory distress. He has no wheezes. He has no rales.   Abdominal: Abdomen is soft. He exhibits no distension. There is no abdominal tenderness. There is no rebound.   Genitourinary:    Genitourinary Comments: Pearson catheter in place. White discharge expressible from penile meatus around pearson. No testicular tenderness or scrotal swelling. Yellow urine in pearson bag with a small amount of sediment. No bleeding or clots.      Musculoskeletal:         General: No tenderness or edema. Normal range of motion.      Cervical back: Normal range of motion and neck supple.     Neurological: He is alert, oriented to person, place, and time and easily aroused. He has normal strength. No sensory deficit.   Skin: Skin is warm and dry. No rash noted. No erythema.   Psychiatric: He has a normal mood and affect. His speech is normal.       ED Course   Procedures  Labs Reviewed   CBC W/ AUTO DIFFERENTIAL - Abnormal; Notable for the following components:       Result Value    RBC 4.00 (*)     Hemoglobin 9.5 (*)     Hematocrit 33.8 (*)     MCH 23.8 (*)     MCHC 28.1 (*)     RDW 17.3 (*)     Eos # 1.0 (*)     Lymph % 14.7 (*)     Eosinophil % 12.7 (*)     All other components within normal limits   COMPREHENSIVE METABOLIC PANEL - Abnormal; Notable for the following components:    CO2 22 (*)     Glucose 113 (*)     BUN 39 (*)     Creatinine 3.7 (*)     Calcium 8.2 (*)     Total Protein 8.6 (*)     Albumin 2.7 (*)     eGFR 17.1 (*)     All other components within normal limits   URINALYSIS, REFLEX TO URINE CULTURE - Abnormal; Notable for the following components:    Appearance, UA Hazy (*)     Protein, UA 2+ (*)     Occult Blood UA 3+ (*)     Leukocytes, UA 3+ (*)     All other components within normal limits    Narrative:     Specimen Source->Urine   URINALYSIS  MICROSCOPIC - Abnormal; Notable for the following components:    RBC, UA >100 (*)     WBC, UA >100 (*)     WBC Clumps, UA Moderate (*)     Bacteria Moderate (*)     All other components within normal limits    Narrative:     Specimen Source->Urine   ISTAT PROCEDURE - Abnormal; Notable for the following components:    POC PH 7.475 (*)     POC SATURATED O2 81 (*)     All other components within normal limits   CULTURE, BLOOD   CULTURE, BLOOD   CULTURE, URINE   MAGNESIUM   PHOSPHORUS   ISTAT LACTATE          Imaging Results              X-Ray Chest AP Portable (Final result)  Result time 03/16/23 21:51:58      Final result by Dylan Garnett MD (03/16/23 21:51:58)                   Impression:      Improved aeration of the lung fields.  No new airspace disease.      Electronically signed by: Dylan Garnett MD  Date:    03/16/2023  Time:    21:51               Narrative:    EXAMINATION:  XR CHEST AP PORTABLE    CLINICAL HISTORY:  Sepsis;    TECHNIQUE:  Single frontal view of the chest was performed.    COMPARISON:  02/27/2023.    FINDINGS:  Monitoring EKG leads are present.  The trachea is unremarkable.  The cardiomediastinal silhouette is within normal limits.  The hemidiaphragms are unremarkable.  There are no pleural effusions.  There is no evidence of a pneumothorax.  There is no evidence of pneumomediastinum.  There is improved aeration of the lung fields.  There is no new airspace disease.  There are degenerative changes in the osseous structures.                                       Medications   cefTRIAXone (ROCEPHIN) 1 g in dextrose 5 % in water (D5W) 5 % 50 mL IVPB (MB+) (1 g Intravenous New Bag 3/16/23 2140)   morphine injection 2 mg (2 mg Intravenous Given 3/16/23 2107)     Medical Decision Making:   History:   Old Medical Records: I decided to obtain old medical records.  Old Records Summarized: records from clinic visits and records from previous admission(s).  Independently Interpreted Test(s):   I have  ordered and independently interpreted EKG Reading(s) - see summary below       <> Summary of EKG Reading(s): Sinus rhythm 95 bpm with PVCs. Prolonged QT. No STEMI  Clinical Tests:   Lab Tests: Ordered and Reviewed  Radiological Study: Ordered and Reviewed  Medical Tests: Ordered and Reviewed  Other:   I have discussed this case with another health care provider.       <> Summary of the Discussion: Hospital Medicine     APC / Resident Notes:   67 y.o. male with medical history of Kidney Tx (x2 1992, 2005), CKD, CAD, PAFib on Eliquis, Diastolic HF presenting to the ED c/o penile and bladder pain for a few days. Pearson catheter in place from recent admission for KATINA and urinary retention. DDx includes but not limited to pearson catheter complication, UTI, urethritis, bacteremia, transplant rejection, KATINA, electrolyte disturbance, candidiasis.     Pearson catheter exchanged and UA sample obtained from new pearson which is consistent with UTI. Crt 3.7 which appears to be his baseline. No leukocytosis. Lactate normal. Rocephin given for complicated UTI coverage. Discussed with , placed in observation for further management. Patient expresses understanding and agreeable to the plan. I have discussed the care of this patient with my supervising physician.                    Clinical Impression:   Final diagnoses:  [R00.0] Tachycardia  [Z94.0] History of kidney transplant  [N18.9] Chronic kidney disease, unspecified CKD stage  [N39.0] Urinary tract infection without hematuria, site unspecified (Primary)        ED Disposition Condition    Admit Stable                Kalia Manuel PA-C  03/16/23 1018

## 2023-03-17 NOTE — PLAN OF CARE
Problem: Infection  Goal: Absence of Infection Signs and Symptoms  Outcome: Ongoing, Progressing  Intervention: Prevent or Manage Infection  Flowsheets (Taken 3/17/2023 1811)  Infection Management: aseptic technique maintained  Isolation Precautions:   precautions maintained   protective     Problem: Adult Inpatient Plan of Care  Goal: Plan of Care Review  Outcome: Ongoing, Progressing     Problem: Adult Inpatient Plan of Care  Goal: Absence of Hospital-Acquired Illness or Injury  Intervention: Identify and Manage Fall Risk  Flowsheets (Taken 3/17/2023 1811)  Safety Promotion/Fall Prevention:   assistive device/personal item within reach   side rails raised x 3   medications reviewed   supervised activity   family to remain at bedside     Problem: Fluid and Electrolyte Imbalance (Acute Kidney Injury/Impairment)  Goal: Fluid and Electrolyte Balance  Intervention: Monitor and Manage Fluid and Electrolyte Balance  Flowsheets (Taken 3/17/2023 1811)  Fluid/Electrolyte Management: fluids provided   Pt is AAOx4/intermittent confusion. VS WNL on room air,last T 99.6-team 1 notified. Pt was placed on SCD,refused in 30 mins. Pt turns and repositions though the shift. No skin breakdown noted. Pt pain and safety monitored q 1-2 hrs this shift . [place] care performed. Bed locked and in lowest position. Rails elevated x 3. Brakes on. Call light and personal belongings in reach. Will continue monitor. Pt's wife at bedside

## 2023-03-17 NOTE — H&P
[facility] - Emergency Dept  Hospital Medicine  History & Physical    Patient Name: Ibrahima Phelps  MRN: [id number]  Patient Class: OP- Observation  Admission Date: 3/16/2023  Attending Physician: Hussain Silva MD   Primary Care Provider: Connie Magdaleno MD         Patient information was obtained from patient, past medical records and ER records.     Subjective:     Principal Problem:<principal problem not specified>    Chief Complaint:   Chief Complaint   Patient presents with    Catheter Problem     Patient with pearson cath inserted on 03/05 due to urinary retention. Patient reports he has not had any issues with the catheter until this morning. Patient reports bladder pressure and spasm, but endorses normal urinary output. No blood or clots present in drainage bag.         HPI: 66 yo M with pmhx Kidney transplant x2 (1992 & 2005), CKD, CAD, pAfib on eliquis, CHF, who presents for penile pain associated with pearson catheter. He had a pearson placed during his recent admission to [facility] for KATINA. He was sent home w pearson in place 3/6. He began having penile & bladder pain yesterday and sought evaluation. His wife has noticed intermittent confusion during this period. They deny any fevers, chills, chest pain, SOB, or other symptoms.     In the ED he was noted to have white discharge from the penis around the pearson. Pearson was exchanged and UA from new pearson showed bacteria, WBC, and RBC. He was given ceftriaxone x 1 in the ED. He is admitted to hospital medicine for UTI.       Past Medical History:   Diagnosis Date    (HFpEF) heart failure with preserved ejection fraction 9/25/2017    Atrial fibrillation     CAD (coronary artery disease)     CHF (congestive heart failure)     Chronic diastolic heart failure 4/8/2020    CKD (chronic kidney disease) stage 3, GFR 30-59 ml/min     Dr. Latif    CKD (chronic kidney disease) stage 3, GFR 30-59 ml/min     Diverticulosis     Fever blister     Heart attack 2006     "Hyperlipidemia     Hypertension     Immunodeficiency due to treatment with immunosuppressive medication     Keloid cicatrix     Left lower quadrant abdominal pain 6/5/2022    Metabolic bone disease     Pericarditis     S/P kidney transplant     Supraventricular tachycardia 06/2019    Thrombocytopenia     Thyroid disease     Tobacco use 9/5/2014    Unspecified disorder of kidney and ureter     Stage IIICKD       Past Surgical History:   Procedure Laterality Date    CARDIOVERSION  04/30/15    CHOLECYSTECTOMY      COLONOSCOPY  April 20, 2011    Diverticulosis, repeat recommended in 3 yrs., repeat colonoscopy 2014 revealed 2 polyps.  He should return in 5 years.    COLONOSCOPY N/A 3/13/2020    Procedure: COLONOSCOPY;  Surgeon: Chon Casper MD;  Location: [facility] (4TH FLR);  Service: Endoscopy;  Laterality: N/A;  ok to hold coumadin x5days-see telephone encounter 2/4/20-tb    COLONOSCOPY N/A 2/4/2021    Procedure: COLONOSCOPY;  Surgeon: Chon Casepr MD;  Location: [facility] (4TH FLR);  Service: Endoscopy;  Laterality: N/A;  Eliquis - per Dr. GAVIN Blunt ok to hold x 2 days and "restarted asap"- ERW  last prep "poor", [id number] ordered/ Pt requests Dr. Casper  prep ins. mailed - COVID screening on 2/1/21 PCW- ERW    COLONOSCOPY N/A 3/3/2021    Procedure: COLONOSCOPY;  Surgeon: Chon Casper MD;  Location: [facility] (4TH FLR);  Service: Endoscopy;  Laterality: N/A;  Patient with his second poor bowel pre and has poor prep today.  If patient not intersted or can't get colonoscopy tomorrow will need constipation bowel prep and will need to restart his Eliquis today.Thanks,Chon     per Dr Blunt-ok to hold Eliquis 2 days prior      COVID     CORONARY ANGIOPLASTY WITH STENT PLACEMENT  9/13/2006    KIDNEY TRANSPLANT  2005    PARATHYROIDECTOMY      TREATMENT OF CARDIAC ARRHYTHMIA N/A 3/28/2022    Procedure: CARDIOVERSION;  Surgeon: Migue Blunt MD;  Location: [facility] EP LAB;  Service: " Cardiology;  Laterality: N/A;  AF, DCC, MAC, GP, 3 PREP*RODRIGO deferred pt 100% compliant*       Review of patient's allergies indicates:   Allergen Reactions    Ace inhibitors Swelling    Verapamil Other (See Comments)     Other reaction(s): Unknown    Povidone-iodine Itching       No current facility-administered medications on file prior to encounter.     Current Outpatient Medications on File Prior to Encounter   Medication Sig    acetaminophen (TYLENOL) 500 MG tablet Take 1 tablet (500 mg total) by mouth every 6 (six) hours as needed for Pain.    albuterol (PROVENTIL) 2.5 mg /3 mL (0.083 %) nebulizer solution Take 3 mLs (2.5 mg total) by nebulization every 6 (six) hours as needed for Wheezing. Rescue    albuterol (VENTOLIN HFA) 90 mcg/actuation inhaler Inhale 2 puffs into the lungs every 6 (six) hours as needed for Wheezing or Shortness of Breath. Rescue    ammonium lactate 12 % Crea Apply 1 g topically once daily.    aspirin (ECOTRIN) 81 MG EC tablet Take 1 tablet by mouth Daily.    atorvastatin (LIPITOR) 10 MG tablet Take 10 mg by mouth.    calcium carbonate (CALCIUM 600 ORAL) Take by mouth. Pt taking 1200 daily    colchicine (MITIGARE) 0.6 mg Cap One po BID prn gout flare up to 3 days    doxazosin (CARDURA) 4 MG tablet TAKE 1 TABLET EVERY 12 HOURS    ELIQUIS 5 mg Tab TAKE 1 TABLET BY MOUTH TWICE A DAY    famotidine (PEPCID) 20 MG tablet TAKE 1 TABLET TWICE DAILY    fluticasone propion-salmeterol 115-21 mcg/dose (ADVAIR HFA) 115-21 mcg/actuation HFAA inhaler Inhale 2 puffs into the lungs every 12 (twelve) hours. Controller    fluticasone propionate (FLONASE) 50 mcg/actuation nasal spray 1 spray (50 mcg total) by Each Nostril route once daily.    furosemide (LASIX) 80 MG tablet Take 1 tablet (80 mg total) by mouth 2 (two) times daily.    gabapentin (NEURONTIN) 100 MG capsule Take 1 capsule (100 mg total) by mouth every evening.    hydrALAZINE (APRESOLINE) 50 MG tablet TAKE 1 TABLET (50 MG  TOTAL) BY MOUTH 3 (THREE) TIMES DAILY.    LIDOcaine (LIDODERM) 5 % Place 1 patch onto the skin daily as needed (back pain). Remove & Discard patch within 12 hours or as directed by MD    metoprolol succinate (TOPROL-XL) 25 MG 24 hr tablet Take 1 tablet (25 mg total) by mouth once daily.    multivitamin (THERAGRAN) per tablet Take 1 tablet by mouth Daily.    NIFEdipine (PROCARDIA-XL) 60 MG (OSM) 24 hr tablet Take 1 tablet (60 mg total) by mouth 2 (two) times a day.    omeprazole (PRILOSEC) 20 MG capsule Take 1 capsule (20 mg total) by mouth once daily.    paricalcitoL (ZEMPLAR) 1 MCG capsule TAKE 1 CAPSULE ON MONDAY, WEDNESDAY AND FRIDAY    predniSONE (DELTASONE) 5 MG tablet TAKE 1 TABLET BY MOUTH EVERY DAY IN THE MORNING    pulse oximeter (PULSE OXIMETER) device by Apply Externally route 2 (two) times a day. Use twice daily at 8 AM and 3 PM and record the value in [employer] as directed.    tacrolimus (PROGRAF) 1 MG Cap Take 2 capsules (2 mg total) by mouth every morning AND 2 capsules (2 mg total) every evening.    vitamin D 1000 units Tab Take 370 mg by mouth 2 (two) times daily. 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     Patient is currently retired   Substance and Sexual Activity    Alcohol use: No    Drug use: No    Sexual activity: Yes     Partners: Female     Review of Systems   Constitutional:  Negative for chills and fever.   HENT:  Negative for congestion, rhinorrhea and sore throat.     Respiratory:  Negative for cough and shortness of breath.    Cardiovascular:  Positive for leg swelling. Negative for chest pain.   Gastrointestinal:  Negative for abdominal pain (suprapubic), constipation, diarrhea, nausea and vomiting.   Genitourinary:  Positive for difficulty urinating and dysuria. Negative for flank pain, hematuria and penile pain.   Neurological:  Negative for numbness and headaches.   Psychiatric/Behavioral:  Positive for confusion.    Objective:     Vital Signs (Most Recent):  Temp: 98.6 °F (37 °C) (03/16/23 2215)  Pulse: 81 (03/16/23 2215)  Resp: 17 (03/16/23 2215)  BP: 130/60 (03/16/23 2215)  SpO2: 100 % (03/16/23 2215) Vital Signs (24h Range):  Temp:  [98.6 °F (37 °C)-98.9 °F (37.2 °C)] 98.6 °F (37 °C)  Pulse:  [] 81  Resp:  [16-19] 17  SpO2:  [98 %-100 %] 100 %  BP: (130-157)/(60-83) 130/60     Weight: 98 kg (216 lb)  Body mass index is 28.5 kg/m².    Physical Exam  Vitals and nursing note reviewed.   Constitutional:       General: He is not in acute distress.     Appearance: Normal appearance. He is not ill-appearing or toxic-appearing.   HENT:      Head: Normocephalic.      Mouth/Throat:      Mouth: Mucous membranes are moist.   Eyes:      General: No scleral icterus.     Conjunctiva/sclera: Conjunctivae normal.   Cardiovascular:      Rate and Rhythm: Normal rate and regular rhythm.      Pulses: Normal pulses.      Heart sounds: Normal heart sounds. No murmur heard.    No friction rub. No gallop.   Pulmonary:      Effort: Pulmonary effort is normal. No respiratory distress.      Breath sounds: Normal breath sounds. No wheezing or rales.   Chest:      Chest wall: No tenderness.   Abdominal:      General: Abdomen is flat. There is no distension.      Palpations: Abdomen is soft.      Tenderness: There is no abdominal tenderness. There is no guarding.   Genitourinary:     Penis: Normal.       Comments: Hamilton in place  Musculoskeletal:      Right lower leg: Edema present.      Left  lower leg: Edema present.   Skin:     General: Skin is warm and dry.      Capillary Refill: Capillary refill takes less than 2 seconds.   Neurological:      General: No focal deficit present.      Mental Status: He is alert. Mental status is at baseline. He is disoriented.      Comments: Moving all extremities, no slurring of speech, no facial droop, no obvious deficits    Psychiatric:      Comments: Pt answering questions coherently but with difficulty remembering things. Oriented to self only.            Significant Labs: All pertinent labs within the past 24 hours have been reviewed.  CBC:   Recent Labs   Lab 03/16/23 1942   WBC 8.10   HGB 9.5*   HCT 33.8*        CMP:   Recent Labs   Lab 03/16/23 1942      K 4.3      CO2 22*   *   BUN 39*   CREATININE 3.7*   CALCIUM 8.2*   PROT 8.6*   ALBUMIN 2.7*   BILITOT 0.3   ALKPHOS 70   AST 28   ALT 24   ANIONGAP 14     Urine Studies:   Recent Labs   Lab 03/16/23 2115   COLORU Yellow   APPEARANCEUA Hazy*   PHUR 6.0   SPECGRAV 1.020   PROTEINUA 2+*   GLUCUA Negative   KETONESU Negative   BILIRUBINUA Negative   OCCULTUA 3+*   NITRITE Negative   LEUKOCYTESUR 3+*   RBCUA >100*   WBCUA >100*   BACTERIA Moderate*   SQUAMEPITHEL 1   HYALINECASTS 0       Significant Imaging: I have reviewed all pertinent imaging results/findings within the past 24 hours.    Assessment/Plan:     UTI (urinary tract infection)  Associated with pearson placed during previous admission. Pearson exchanged in ED. Ceftriaxone x1 given in ED  - Continue ceftriaxone 1g QD, will narrow as cultures return  - f/u Bcx  - f/u Ucx   - Will need voiding trial when appropriate w possible urology f/u    Kidney replaced by transplant  S/p kidney transplant x2 (1992 & 2005)  - Continue tacrolimus  - KTM consulted    Paroxysmal atrial fibrillation  Continue home eliquis      Mixed hyperlipidemia  Continue home statin      CAD (coronary artery disease)  Continue home meds  - ASA  -  Metoprolol    Hypertension  Continue home regimen  - hydralazine  - nifedipine      Long-term use of immunosuppressant medication  - Continue home tacro BID  - KTM consulted        VTE Risk Mitigation (From admission, onward)         Ordered     apixaban tablet 5 mg  2 times daily         03/16/23 2338     IP VTE HIGH RISK PATIENT  Once         03/16/23 2334     Place sequential compression device  Until discontinued         03/16/23 2334                   On 03/17/2023, patient should be placed in hospital observation services under my care.        Sean Colin MD  Department of Hospital Medicine  [facility] - Emergency Dept

## 2023-03-17 NOTE — ASSESSMENT & PLAN NOTE
Associated with pearson placed during previous admission. Pearson exchanged in ED. Ceftriaxone x1 given in ED  - Continue ceftriaxone 1g QD, will narrow as cultures return  - f/u Bcx  - f/u Ucx   - Will need voiding trial when appropriate w possible urology f/u

## 2023-03-17 NOTE — ED NOTES
Took report from Ronal BEVERLY, and assumed care of pt at this time. Pt restless and complaining of intermittent bladder spasms. Pt independently repositioned in stretcher with bed locked in lowest position for safety. NAD noted at this time. Respirations even and unlabored and visible chest rise noted.  Patient has pearson catheter in place. Pt instructed to call if assistance is needed. Pt on continuous cardiac, BP, and O2 monitoring. Call light within reach. Family at bedside. Will continue to monitor.

## 2023-03-17 NOTE — ASSESSMENT & PLAN NOTE
H/O kidney transplant  -- trasnsplant kidney 2x, first one was in 1992 and second one was 2005 patient on Prograf 2.5 mg BID and prednisone 5 mg PO daily as outpatient     KATINA on CKD (baseline creatinine ~2.6-2.8),   - Per recent eval on 03/06 thought to be most consistent with progressive failing kidney and UTI/Urinary retnetion transplant however not currently amendable to RRT should that time arise for which Palliative Medicine consulted and to follow-up outpatient (patient still not wanting RRT)  - can continue Lasix 80 mg PO BID,   - Strict ins and outs  - renally dose all meds  - avoid nephrotoxins.     Long-term use of immunosuppressant medication  - outpatient regimen consists of tacrolimus 2.5 mg BID and prednisone 5 mg daily  Plan:  - will continue with tacrolimus 2 mg BID and prednisone 5 mg daily  - goal Prograf trough (4-6).  - Obtain Tacro level in AM

## 2023-03-17 NOTE — SUBJECTIVE & OBJECTIVE
Subjective:     History of Present Illness:   68 yo M with pmhx Kidney transplant x2 (1992 & 2005), CKD, CAD, pAfib on eliquis, CHF, who presents for penile pain associated with pearson catheter. Patient found to have acute UTI. Pearson exchanged and started on IV abx. Recently discharged with KATINA and urinary retention on 03/06. At that time Scr ~4.6 and KATINA thought to be 2/2 to progressive kidney transplant failure. Patient did not want HD and palliative team was consulted. On admission Scr now 3.4. No severe electrolyte abnormality. Transplant nephro consulted for history of renal transplant and IS management.     Mr. Phelps is a 67 y.o. year old male who is status post Kidney Transplant - 4/12/2005 - Not Followed  (#2).    He received induction therapy with Campath and his maintenance immunosuppression consists of:   Immunosuppressants (From admission, onward)      Start     Stop Route Frequency Ordered    03/17/23 0800  tacrolimus capsule 2 mg         -- Oral 2 times daily 03/16/23 2338            Interval History:      Past Medical and Surgical History: Mr. Phelps has a past medical history of Atrial fibrillation, CAD (coronary artery disease) (2006), CHF (congestive heart failure) (2017), CKD (chronic kidney disease) stage 3, GFR 30-59 ml/min, COVID-19 (2/7/2023), Diverticulosis, Hyperlipidemia, Hypertension, Keloid cicatrix, Metabolic bone disease, Other emphysema (10/1/2019), Pericarditis, S/P kidney transplant (1992), Thrombocytopenia, Thyroid disease, and Tobacco use (09/05/2014).  He has a past surgical history that includes Cholecystectomy; Cardioversion (04/30/15); Kidney transplant (2005); Parathyroidectomy; Colonoscopy (April 20, 2011); Colonoscopy (N/A, 3/13/2020); Colonoscopy (N/A, 2/4/2021); Colonoscopy (N/A, 3/3/2021); Coronary angioplasty with stent (9/13/2006); and Treatment of cardiac arrhythmia (N/A, 3/28/2022).    Past Social and Family History: Mr. Phelps reports that he quit smoking about 12  "months ago. His smoking use included cigarettes. He has a 20.00 pack-year smoking history. He has never used smokeless tobacco. He reports that he does not drink alcohol and does not use drugs.His family history includes Heart disease in his father; Kidney disease in his brother and sister; Kidney failure in his brother and sister; No Known Problems in his brother, sister, sister, and sister; Thyroid disease in his mother.    Intake/Output - Last 3 Shifts         03/15 0700  03/16 0659 03/16 0700  03/17 0659 03/17 0700  03/18 0659    P.O.  120 390    Total Intake(mL/kg)  120 (1.2) 390 (4)    Urine (mL/kg/hr)  400     Emesis/NG output  0     Stool  0     Total Output  400     Net  -280 +390           Stool Occurrence   0 x             Review of Systems  Objective:     Vital Signs (Most Recent):  Temp: 99.6 °F (37.6 °C) (03/17/23 1543)  Pulse: 74 (03/17/23 1614)  Resp: 18 (03/17/23 1543)  BP: 135/63 (Simultaneous filing. User may not have seen previous data.) (03/17/23 1543)  SpO2: 96 % (03/17/23 1543) Vital Signs (24h Range):  Temp:  [97.7 °F (36.5 °C)-99.6 °F (37.6 °C)] 99.6 °F (37.6 °C)  Pulse:  [] 74  Resp:  [16-20] 18  SpO2:  [94 %-100 %] 96 %  BP: (130-165)/(6-84) 135/63     Weight: 98 kg (215 lb 15.8 oz)  Height: 6' 1" (185.4 cm)  Body mass index is 28.5 kg/m².    Physical Exam  Vitals and nursing note reviewed.   Constitutional:       General: He is not in acute distress.     Appearance: Normal appearance. He is not ill-appearing or toxic-appearing.   HENT:      Head: Normocephalic.      Mouth/Throat:      Mouth: Mucous membranes are moist.   Eyes:      General: No scleral icterus.     Conjunctiva/sclera: Conjunctivae normal.   Cardiovascular:      Rate and Rhythm: Normal rate and regular rhythm.      Pulses: Normal pulses.      Heart sounds: Normal heart sounds. No murmur heard.    No friction rub. No gallop.   Pulmonary:      Effort: Pulmonary effort is normal. No respiratory distress.      Breath " sounds: Normal breath sounds. No wheezing or rales.   Chest:      Chest wall: No tenderness.   Abdominal:      General: Abdomen is flat. There is no distension.      Palpations: Abdomen is soft.      Tenderness: There is no abdominal tenderness. There is no guarding.   Genitourinary:     Penis: Normal.       Comments: Hamilton in place  Musculoskeletal:      Right lower leg: Edema present.      Left lower leg: Edema present.   Skin:     General: Skin is warm and dry.      Capillary Refill: Capillary refill takes less than 2 seconds.   Neurological:      General: No focal deficit present.      Mental Status: He is alert. Mental status is at baseline. He is disoriented.      Comments: Moving all extremities, no slurring of speech, no facial droop, no obvious deficits    Psychiatric:      Comments: Pt answering questions coherently but with difficulty remembering things. Oriented to self only.        Significant Labs:  Labs within the past 24 hours have been reviewed.    Diagnostics:  None

## 2023-03-17 NOTE — HPI
68 yo M with pmhx Kidney transplant x2 (1992 & 2005), CKD, CAD, pAfib on eliquis, CHF, who presents for penile pain associated with pearson catheter. Patient found to have acute UTI. Pearson exchanged and started on IV abx. Recently discharged with KATINA and urinary retention on 03/06. At that time Scr ~4.6 and KATINA thought to be 2/2 to progressive kidney transplant failure. Patient did not want HD and palliative team was consulted. On admission Scr now 3.4. No severe electrolyte abnormality. Transplant nephro consulted for history of renal transplant and IS management.

## 2023-03-17 NOTE — PROGRESS NOTES
Pharmacist Renal Dose Adjustment Note    Ibrahima Phelps is a 67 y.o. male being treated with the medication famotidine.    Patient Data:    Vital Signs (Most Recent):  Temp: 98.6 °F (37 °C) (03/16/23 2215)  Pulse: 81 (03/16/23 2215)  Resp: 17 (03/16/23 2215)  BP: 130/60 (03/16/23 2215)  SpO2: 100 % (03/16/23 2215) Vital Signs (72h Range):  Temp:  [98.6 °F (37 °C)-98.9 °F (37.2 °C)]   Pulse:  []   Resp:  [16-19]   BP: (130-157)/(60-83)   SpO2:  [98 %-100 %]      Recent Labs   Lab 03/16/23 1942   CREATININE 3.7*     Serum creatinine: 3.7 mg/dL (H) 03/16/23 1942  Estimated creatinine clearance: 23.5 mL/min (A)    Famotidine 20 mg BID will be changed to famotidine 20 mg QD    Pharmacist's Name: Zunilda Campbell  Pharmacist's Extension: [id number]

## 2023-03-17 NOTE — HPI
68 yo M with pmhx Kidney transplant x2 (1992 & 2005), CKD, CAD, pAfib on eliquis, CHF, who presents for penile pain associated with pearson catheter. He had a pearson placed during his recent admission to [facility] for KATINA. He was sent home w pearson in place 3/6. He began having penile & bladder pain yesterday and sought evaluation. His wife has noticed intermittent confusion during this period. They deny any fevers, chills, chest pain, SOB, or other symptoms.     In the ED he was noted to have white discharge from the penis around the pearson. Pearson was exchanged and UA from new pearson showed bacteria, WBC, and RBC. He was given ceftriaxone x 1 in the ED. He is admitted to hospital medicine for UTI.

## 2023-03-17 NOTE — ED TRIAGE NOTES
Patient reports bladder fullness and pressure after having a Hamilton placed on 3/5. The catheter is draining appropriately.

## 2023-03-17 NOTE — SUBJECTIVE & OBJECTIVE
"Past Medical History:   Diagnosis Date    (HFpEF) heart failure with preserved ejection fraction 9/25/2017    Atrial fibrillation     CAD (coronary artery disease)     CHF (congestive heart failure)     Chronic diastolic heart failure 4/8/2020    CKD (chronic kidney disease) stage 3, GFR 30-59 ml/min     Dr. Latif    CKD (chronic kidney disease) stage 3, GFR 30-59 ml/min     Diverticulosis     Fever blister     Heart attack 2006    Hyperlipidemia     Hypertension     Immunodeficiency due to treatment with immunosuppressive medication     Keloid cicatrix     Left lower quadrant abdominal pain 6/5/2022    Metabolic bone disease     Pericarditis     S/P kidney transplant     Supraventricular tachycardia 06/2019    Thrombocytopenia     Thyroid disease     Tobacco use 9/5/2014    Unspecified disorder of kidney and ureter     Stage IIICKD       Past Surgical History:   Procedure Laterality Date    CARDIOVERSION  04/30/15    CHOLECYSTECTOMY      COLONOSCOPY  April 20, 2011    Diverticulosis, repeat recommended in 3 yrs., repeat colonoscopy 2014 revealed 2 polyps.  He should return in 5 years.    COLONOSCOPY N/A 3/13/2020    Procedure: COLONOSCOPY;  Surgeon: Chon Casper MD;  Location: [facility] (4TH FLR);  Service: Endoscopy;  Laterality: N/A;  ok to hold coumadin x5days-see telephone encounter 2/4/20-tb    COLONOSCOPY N/A 2/4/2021    Procedure: COLONOSCOPY;  Surgeon: Chon Casper MD;  Location: [facility] (4TH FLR);  Service: Endoscopy;  Laterality: N/A;  Eliquis - per Dr. GAVIN Bulnt ok to hold x 2 days and "restarted asap"- ERW  last prep "poor", [id number] ordered/ Pt requests Dr. Casper  prep ins. mailed - COVID screening on 2/1/21 PCW- ERW    COLONOSCOPY N/A 3/3/2021    Procedure: COLONOSCOPY;  Surgeon: Chon Casper MD;  Location: [facility] (4TH FLR);  Service: Endoscopy;  Laterality: N/A;  Patient with his second poor bowel pre and has poor prep today.  If patient not intersted or can't get colonoscopy " tomorrow will need constipation bowel prep and will need to restart his Eliquis today.Thanks,Chon     per Dr Blunt-ok to hold Eliquis 2 days prior      COVID     CORONARY ANGIOPLASTY WITH STENT PLACEMENT  9/13/2006    KIDNEY TRANSPLANT  2005    PARATHYROIDECTOMY      TREATMENT OF CARDIAC ARRHYTHMIA N/A 3/28/2022    Procedure: CARDIOVERSION;  Surgeon: Migue Blunt MD;  Location: [facility] LAB;  Service: Cardiology;  Laterality: N/A;  AF, DCC, MAC, GP, 3 PREP*RODRIGO deferred pt 100% compliant*       Review of patient's allergies indicates:   Allergen Reactions    Ace inhibitors Swelling    Verapamil Other (See Comments)     Other reaction(s): Unknown    Povidone-iodine Itching       No current facility-administered medications on file prior to encounter.     Current Outpatient Medications on File Prior to Encounter   Medication Sig    acetaminophen (TYLENOL) 500 MG tablet Take 1 tablet (500 mg total) by mouth every 6 (six) hours as needed for Pain.    albuterol (PROVENTIL) 2.5 mg /3 mL (0.083 %) nebulizer solution Take 3 mLs (2.5 mg total) by nebulization every 6 (six) hours as needed for Wheezing. Rescue    albuterol (VENTOLIN HFA) 90 mcg/actuation inhaler Inhale 2 puffs into the lungs every 6 (six) hours as needed for Wheezing or Shortness of Breath. Rescue    ammonium lactate 12 % Crea Apply 1 g topically once daily.    aspirin (ECOTRIN) 81 MG EC tablet Take 1 tablet by mouth Daily.    atorvastatin (LIPITOR) 10 MG tablet Take 10 mg by mouth.    calcium carbonate (CALCIUM 600 ORAL) Take by mouth. Pt taking 1200 daily    colchicine (MITIGARE) 0.6 mg Cap One po BID prn gout flare up to 3 days    doxazosin (CARDURA) 4 MG tablet TAKE 1 TABLET EVERY 12 HOURS    ELIQUIS 5 mg Tab TAKE 1 TABLET BY MOUTH TWICE A DAY    famotidine (PEPCID) 20 MG tablet TAKE 1 TABLET TWICE DAILY    fluticasone propion-salmeterol 115-21 mcg/dose (ADVAIR HFA) 115-21 mcg/actuation HFAA inhaler Inhale 2 puffs into the lungs every 12 (twelve) hours.  Controller    fluticasone propionate (FLONASE) 50 mcg/actuation nasal spray 1 spray (50 mcg total) by Each Nostril route once daily.    furosemide (LASIX) 80 MG tablet Take 1 tablet (80 mg total) by mouth 2 (two) times daily.    gabapentin (NEURONTIN) 100 MG capsule Take 1 capsule (100 mg total) by mouth every evening.    hydrALAZINE (APRESOLINE) 50 MG tablet TAKE 1 TABLET (50 MG TOTAL) BY MOUTH 3 (THREE) TIMES DAILY.    LIDOcaine (LIDODERM) 5 % Place 1 patch onto the skin daily as needed (back pain). Remove & Discard patch within 12 hours or as directed by MD    metoprolol succinate (TOPROL-XL) 25 MG 24 hr tablet Take 1 tablet (25 mg total) by mouth once daily.    multivitamin (THERAGRAN) per tablet Take 1 tablet by mouth Daily.    NIFEdipine (PROCARDIA-XL) 60 MG (OSM) 24 hr tablet Take 1 tablet (60 mg total) by mouth 2 (two) times a day.    omeprazole (PRILOSEC) 20 MG capsule Take 1 capsule (20 mg total) by mouth once daily.    paricalcitoL (ZEMPLAR) 1 MCG capsule TAKE 1 CAPSULE ON MONDAY, WEDNESDAY AND FRIDAY    predniSONE (DELTASONE) 5 MG tablet TAKE 1 TABLET BY MOUTH EVERY DAY IN THE MORNING    pulse oximeter (PULSE OXIMETER) device by Apply Externally route 2 (two) times a day. Use twice daily at 8 AM and 3 PM and record the value in MyChart as directed.    tacrolimus (PROGRAF) 1 MG Cap Take 2 capsules (2 mg total) by mouth every morning AND 2 capsules (2 mg total) every evening.    vitamin D 1000 units Tab Take 370 mg by mouth 2 (two) times daily. 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     Patient is currently retired   Substance and Sexual Activity    Alcohol use: No    Drug use: No    Sexual activity: Yes     Partners: Female     Review of Systems   Constitutional:  Negative for chills and fever.   HENT:  Negative for congestion, rhinorrhea and sore throat.    Respiratory:  Negative for cough and shortness of breath.    Cardiovascular:  Positive for leg swelling. Negative for chest pain.   Gastrointestinal:  Negative for abdominal pain (suprapubic), constipation, diarrhea, nausea and vomiting.   Genitourinary:  Positive for difficulty urinating and dysuria. Negative for flank pain, hematuria and penile pain.   Neurological:  Negative for numbness and headaches.   Psychiatric/Behavioral:  Positive for confusion.    Objective:     Vital Signs (Most Recent):  Temp: 98.6 °F (37 °C) (03/16/23 2215)  Pulse: 81 (03/16/23 2215)  Resp: 17 (03/16/23 2215)  BP: 130/60 (03/16/23 2215)  SpO2: 100 % (03/16/23 2215) Vital Signs (24h Range):  Temp:  [98.6 °F (37 °C)-98.9 °F (37.2 °C)] 98.6 °F (37 °C)  Pulse:  [] 81  Resp:  [16-19] 17  SpO2:  [98 %-100 %] 100 %  BP: (130-157)/(60-83) 130/60     Weight: 98 kg (216 lb)  Body mass index is 28.5 kg/m².    Physical Exam  Vitals and nursing note reviewed.   Constitutional:       General: He is not in acute distress.     Appearance: Normal appearance. He is not ill-appearing or toxic-appearing.   HENT:      Head: Normocephalic.      Mouth/Throat:      Mouth: Mucous membranes are moist.   Eyes:      General: No scleral icterus.     Conjunctiva/sclera: Conjunctivae normal.   Cardiovascular:      Rate and Rhythm: Normal rate and regular rhythm.      Pulses: Normal pulses.      Heart sounds: Normal heart sounds. No murmur heard.    No friction rub. No gallop.   Pulmonary:      Effort: Pulmonary effort is normal. No respiratory distress.      Breath sounds: Normal breath  sounds. No wheezing or rales.   Chest:      Chest wall: No tenderness.   Abdominal:      General: Abdomen is flat. There is no distension.      Palpations: Abdomen is soft.      Tenderness: There is no abdominal tenderness. There is no guarding.   Genitourinary:     Penis: Normal.       Comments: Hamilton in place  Musculoskeletal:      Right lower leg: Edema present.      Left lower leg: Edema present.   Skin:     General: Skin is warm and dry.      Capillary Refill: Capillary refill takes less than 2 seconds.   Neurological:      General: No focal deficit present.      Mental Status: He is alert. Mental status is at baseline. He is disoriented.      Comments: Moving all extremities, no slurring of speech, no facial droop, no obvious deficits    Psychiatric:      Comments: Pt answering questions coherently but with difficulty remembering things. Oriented to self only.            Significant Labs: All pertinent labs within the past 24 hours have been reviewed.  CBC:   Recent Labs   Lab 03/16/23 1942   WBC 8.10   HGB 9.5*   HCT 33.8*        CMP:   Recent Labs   Lab 03/16/23 1942      K 4.3      CO2 22*   *   BUN 39*   CREATININE 3.7*   CALCIUM 8.2*   PROT 8.6*   ALBUMIN 2.7*   BILITOT 0.3   ALKPHOS 70   AST 28   ALT 24   ANIONGAP 14     Urine Studies:   Recent Labs   Lab 03/16/23 2115   COLORU Yellow   APPEARANCEUA Hazy*   PHUR 6.0   SPECGRAV 1.020   PROTEINUA 2+*   GLUCUA Negative   KETONESU Negative   BILIRUBINUA Negative   OCCULTUA 3+*   NITRITE Negative   LEUKOCYTESUR 3+*   RBCUA >100*   WBCUA >100*   BACTERIA Moderate*   SQUAMEPITHEL 1   HYALINECASTS 0       Significant Imaging: I have reviewed all pertinent imaging results/findings within the past 24 hours.

## 2023-03-17 NOTE — PLAN OF CARE
Problem: Oral Intake Inadequate (Acute Kidney Injury/Impairment)  Goal: Optimal Nutrition Intake  Outcome: Ongoing, Progressing     Problem: Renal Function Impairment (Acute Kidney Injury/Impairment)  Goal: Effective Renal Function  Outcome: Ongoing, Progressing   New Admit to [facility] / Recent discharge just 10 days ago.  Pt AAO X 4; able to express needs/ wife at the bedside to assist.  C/o bladder pain. Morphine x1 given as ordered.  PRN Oxy starting this morning if needed.   Hamilton Cath replaced in ED.  Rocephin ordered Q24 for current UTI.   IP consult to Kidney Transplant Service for evaluation.     Safety maintained.  Bed in low position,  call  light in reach.

## 2023-03-17 NOTE — CONSULTS
[facility] - Intensive Care ([place] [address])  Kidney Transplant  Consult Note    Inpatient consult to Kidney/Pancreas Transplant Medicine  Consult performed by: Dustin Chairez DO  Consult ordered by: Sean Colin MD            Subjective:     History of Present Illness:   66 yo M with pmhx Kidney transplant x2 (1992 & 2005), CKD, CAD, pAfib on eliquis, CHF, who presents for penile pain associated with pearson catheter. Patient found to have acute UTI. Pearson exchanged and started on IV abx. Recently discharged with KATINA and urinary retention on 03/06. At that time Scr ~4.6 and KATINA thought to be 2/2 to progressive kidney transplant failure. Patient did not want HD and palliative team was consulted. On admission Scr now 3.4. No severe electrolyte abnormality. Transplant nephro consulted for history of renal transplant and IS management.     Mr. Phelps is a 67 y.o. year old male who is status post Kidney Transplant - 4/12/2005 - Not Followed  (#2).    He received induction therapy with Campath and his maintenance immunosuppression consists of:   Immunosuppressants (From admission, onward)      Start     Stop Route Frequency Ordered    03/17/23 0800  tacrolimus capsule 2 mg         -- Oral 2 times daily 03/16/23 2338            Interval History:      Past Medical and Surgical History: Mr. Phelps has a past medical history of Atrial fibrillation, CAD (coronary artery disease) (2006), CHF (congestive heart failure) (2017), CKD (chronic kidney disease) stage 3, GFR 30-59 ml/min, COVID-19 (2/7/2023), Diverticulosis, Hyperlipidemia, Hypertension, Keloid cicatrix, Metabolic bone disease, Other emphysema (10/1/2019), Pericarditis, S/P kidney transplant (1992), Thrombocytopenia, Thyroid disease, and Tobacco use (09/05/2014).  He has a past surgical history that includes Cholecystectomy; Cardioversion (04/30/15); Kidney transplant (2005); Parathyroidectomy; Colonoscopy (April 20, 2011); Colonoscopy (N/A, 3/13/2020); Colonoscopy  "(N/A, 2/4/2021); Colonoscopy (N/A, 3/3/2021); Coronary angioplasty with stent (9/13/2006); and Treatment of cardiac arrhythmia (N/A, 3/28/2022).    Past Social and Family History: Mr. Phelps reports that he quit smoking about 12 months ago. His smoking use included cigarettes. He has a 20.00 pack-year smoking history. He has never used smokeless tobacco. He reports that he does not drink alcohol and does not use drugs.His family history includes Heart disease in his father; Kidney disease in his brother and sister; Kidney failure in his brother and sister; No Known Problems in his brother, sister, sister, and sister; Thyroid disease in his mother.    Intake/Output - Last 3 Shifts         03/15 0700  03/16 0659 03/16 0700  03/17 0659 03/17 0700  03/18 0659    P.O.  120 390    Total Intake(mL/kg)  120 (1.2) 390 (4)    Urine (mL/kg/hr)  400     Emesis/NG output  0     Stool  0     Total Output  400     Net  -280 +390           Stool Occurrence   0 x             Review of Systems  Objective:     Vital Signs (Most Recent):  Temp: 99.6 °F (37.6 °C) (03/17/23 1543)  Pulse: 74 (03/17/23 1614)  Resp: 18 (03/17/23 1543)  BP: 135/63 (Simultaneous filing. User may not have seen previous data.) (03/17/23 1543)  SpO2: 96 % (03/17/23 1543) Vital Signs (24h Range):  Temp:  [97.7 °F (36.5 °C)-99.6 °F (37.6 °C)] 99.6 °F (37.6 °C)  Pulse:  [] 74  Resp:  [16-20] 18  SpO2:  [94 %-100 %] 96 %  BP: (130-165)/(6-84) 135/63     Weight: 98 kg (215 lb 15.8 oz)  Height: 6' 1" (185.4 cm)  Body mass index is 28.5 kg/m².    Physical Exam  Vitals and nursing note reviewed.   Constitutional:       General: He is not in acute distress.     Appearance: Normal appearance. He is not ill-appearing or toxic-appearing.   HENT:      Head: Normocephalic.      Mouth/Throat:      Mouth: Mucous membranes are moist.   Eyes:      General: No scleral icterus.     Conjunctiva/sclera: Conjunctivae normal.   Cardiovascular:      Rate and Rhythm: Normal rate and " regular rhythm.      Pulses: Normal pulses.      Heart sounds: Normal heart sounds. No murmur heard.    No friction rub. No gallop.   Pulmonary:      Effort: Pulmonary effort is normal. No respiratory distress.      Breath sounds: Normal breath sounds. No wheezing or rales.   Chest:      Chest wall: No tenderness.   Abdominal:      General: Abdomen is flat. There is no distension.      Palpations: Abdomen is soft.      Tenderness: There is no abdominal tenderness. There is no guarding.   Genitourinary:     Penis: Normal.       Comments: Hamilton in place  Musculoskeletal:      Right lower leg: Edema present.      Left lower leg: Edema present.   Skin:     General: Skin is warm and dry.      Capillary Refill: Capillary refill takes less than 2 seconds.   Neurological:      General: No focal deficit present.      Mental Status: He is alert. Mental status is at baseline. He is disoriented.      Comments: Moving all extremities, no slurring of speech, no facial droop, no obvious deficits    Psychiatric:      Comments: Pt answering questions coherently but with difficulty remembering things. Oriented to self only.        Significant Labs:  Labs within the past 24 hours have been reviewed.    Diagnostics:  None    Assessment/Plan:     Renal/  * Urinary tract infection associated with indwelling urethral catheter  - per primary and urology teams.       CKD (chronic kidney disease) stage 4, GFR 15-29 ml/min        Kidney replaced by transplant  H/O kidney transplant  -- trasnsplant kidney 2x, first one was in 1992 and second one was 2005 patient on Prograf 2.5 mg BID and prednisone 5 mg PO daily as outpatient     KATINA on CKD (baseline creatinine ~2.6-2.8),   - Per recent eval on 03/06 thought to be most consistent with progressive failing kidney and UTI/Urinary retnetion transplant however not currently amendable to RRT should that time arise for which Palliative Medicine consulted and to follow-up outpatient (patient still  not wanting RRT)  - can continue Lasix 80 mg PO BID,   - Strict ins and outs  - renally dose all meds  - avoid nephrotoxins.     Long-term use of immunosuppressant medication  - outpatient regimen consists of tacrolimus 2.5 mg BID and prednisone 5 mg daily  Plan:  - will continue with tacrolimus 2 mg BID and prednisone 5 mg daily  - goal Prograf trough (4-6).  - Obtain [place] level in AM        Discharge Planning:        Dustin Chairez, DO  Kidney Transplant  [facility] - [facility] ([place])

## 2023-03-17 NOTE — PLAN OF CARE
[facility] - [facility]ensive [facility] ([facility])  Discharge Assessment    Primary Care Provider: Connie Magdaleno MD     Discharge Assessment (most recent)       BRIEF DISCHARGE ASSESSMENT - 03/17/23 0809          Discharge Planning    Assessment Type Discharge Planning Brief Assessment (P)      Resource/Environmental Concerns none (P)      Support Systems Spouse/significant other;Children (P)      Equipment Currently Used at Home walker, rolling (P)      Current Living Arrangements home (P)      Patient/Family Anticipates Transition to home (P)      Patient/Family Anticipated Services at Transition none (P)      DME Needed Upon Discharge  none (P)      Discharge Plan A Home (P)      Discharge Plan B Home with family (P)                          On previous dc no  agency would accept pt due to staffing issues, limited agencies are in network with pt insurance, pt refused snf placement on last discharge, anticipate pt will dc home with family

## 2023-03-18 VITALS
WEIGHT: 216 LBS | HEART RATE: 86 BPM | OXYGEN SATURATION: 91 % | BODY MASS INDEX: 28.63 KG/M2 | HEIGHT: 73 IN | SYSTOLIC BLOOD PRESSURE: 146 MMHG | RESPIRATION RATE: 18 BRPM | TEMPERATURE: 98 F | DIASTOLIC BLOOD PRESSURE: 79 MMHG

## 2023-03-18 PROBLEM — R33.9 URINARY RETENTION: Status: RESOLVED | Noted: 2023-03-17 | Resolved: 2023-03-18

## 2023-03-18 LAB
ALBUMIN SERPL BCP-MCNC: 2.2 G/DL (ref 3.5–5.2)
ALP SERPL-CCNC: 58 U/L (ref 55–135)
ALT SERPL W/O P-5'-P-CCNC: 17 U/L (ref 10–44)
ANION GAP SERPL CALC-SCNC: 10 MMOL/L (ref 8–16)
AST SERPL-CCNC: 22 U/L (ref 10–40)
BASOPHILS # BLD AUTO: 0.02 K/UL (ref 0–0.2)
BASOPHILS NFR BLD: 0.2 % (ref 0–1.9)
BILIRUB SERPL-MCNC: 0.3 MG/DL (ref 0.1–1)
BUN SERPL-MCNC: 31 MG/DL (ref 8–23)
CALCIUM SERPL-MCNC: 7.8 MG/DL (ref 8.7–10.5)
CHLORIDE SERPL-SCNC: 107 MMOL/L (ref 95–110)
CO2 SERPL-SCNC: 23 MMOL/L (ref 23–29)
CREAT SERPL-MCNC: 3.2 MG/DL (ref 0.5–1.4)
DIFFERENTIAL METHOD: ABNORMAL
EOSINOPHIL # BLD AUTO: 1.4 K/UL (ref 0–0.5)
EOSINOPHIL NFR BLD: 16.8 % (ref 0–8)
ERYTHROCYTE [DISTWIDTH] IN BLOOD BY AUTOMATED COUNT: 17.6 % (ref 11.5–14.5)
EST. GFR  (NO RACE VARIABLE): 20.3 ML/MIN/1.73 M^2
GLUCOSE SERPL-MCNC: 78 MG/DL (ref 70–110)
HCT VFR BLD AUTO: 28.3 % (ref 40–54)
HGB BLD-MCNC: 7.9 G/DL (ref 14–18)
IMM GRANULOCYTES # BLD AUTO: 0.03 K/UL (ref 0–0.04)
IMM GRANULOCYTES NFR BLD AUTO: 0.4 % (ref 0–0.5)
LYMPHOCYTES # BLD AUTO: 1.3 K/UL (ref 1–4.8)
LYMPHOCYTES NFR BLD: 15.4 % (ref 18–48)
MAGNESIUM SERPL-MCNC: 2 MG/DL (ref 1.6–2.6)
MCH RBC QN AUTO: 23.6 PG (ref 27–31)
MCHC RBC AUTO-ENTMCNC: 27.9 G/DL (ref 32–36)
MCV RBC AUTO: 85 FL (ref 82–98)
MONOCYTES # BLD AUTO: 0.5 K/UL (ref 0.3–1)
MONOCYTES NFR BLD: 6.4 % (ref 4–15)
NEUTROPHILS # BLD AUTO: 5 K/UL (ref 1.8–7.7)
NEUTROPHILS NFR BLD: 60.8 % (ref 38–73)
NRBC BLD-RTO: 0 /100 WBC
PHOSPHATE SERPL-MCNC: 5 MG/DL (ref 2.7–4.5)
PLATELET # BLD AUTO: 277 K/UL (ref 150–450)
PMV BLD AUTO: 11.1 FL (ref 9.2–12.9)
POTASSIUM SERPL-SCNC: 4 MMOL/L (ref 3.5–5.1)
PROT SERPL-MCNC: 6.8 G/DL (ref 6–8.4)
RBC # BLD AUTO: 3.35 M/UL (ref 4.6–6.2)
SODIUM SERPL-SCNC: 140 MMOL/L (ref 136–145)
TACROLIMUS BLD-MCNC: 2.3 NG/ML (ref 5–15)
WBC # BLD AUTO: 8.17 K/UL (ref 3.9–12.7)

## 2023-03-18 PROCEDURE — 85025 COMPLETE CBC W/AUTO DIFF WBC: CPT

## 2023-03-18 PROCEDURE — 99238 HOSP IP/OBS DSCHRG MGMT 30/<: CPT | Mod: ,,, | Performed by: HOSPITALIST

## 2023-03-18 PROCEDURE — 80197 ASSAY OF TACROLIMUS: CPT | Performed by: STUDENT IN AN ORGANIZED HEALTH CARE EDUCATION/TRAINING PROGRAM

## 2023-03-18 PROCEDURE — 84100 ASSAY OF PHOSPHORUS: CPT

## 2023-03-18 PROCEDURE — 25000003 PHARM REV CODE 250

## 2023-03-18 PROCEDURE — 99214 PR OFFICE/OUTPT VISIT, EST, LEVL IV, 30-39 MIN: ICD-10-PCS | Mod: ,,, | Performed by: INTERNAL MEDICINE

## 2023-03-18 PROCEDURE — G0378 HOSPITAL OBSERVATION PER HR: HCPCS

## 2023-03-18 PROCEDURE — 63600175 PHARM REV CODE 636 W HCPCS

## 2023-03-18 PROCEDURE — 99238 PR HOSPITAL DISCHARGE DAY,<30 MIN: ICD-10-PCS | Mod: ,,, | Performed by: HOSPITALIST

## 2023-03-18 PROCEDURE — 80053 COMPREHEN METABOLIC PANEL: CPT

## 2023-03-18 PROCEDURE — 36415 COLL VENOUS BLD VENIPUNCTURE: CPT | Performed by: STUDENT IN AN ORGANIZED HEALTH CARE EDUCATION/TRAINING PROGRAM

## 2023-03-18 PROCEDURE — 99214 OFFICE O/P EST MOD 30 MIN: CPT | Mod: ,,, | Performed by: INTERNAL MEDICINE

## 2023-03-18 PROCEDURE — 83735 ASSAY OF MAGNESIUM: CPT

## 2023-03-18 RX ORDER — CETIRIZINE HYDROCHLORIDE 5 MG/1
5 TABLET ORAL 2 TIMES DAILY PRN
Qty: 30 TABLET | Refills: 0 | Status: SHIPPED | OUTPATIENT
Start: 2023-03-18 | End: 2023-05-10

## 2023-03-18 RX ORDER — CEFPODOXIME PROXETIL 200 MG/1
200 TABLET, FILM COATED ORAL NIGHTLY
Qty: 8 TABLET | Refills: 0 | Status: SHIPPED | OUTPATIENT
Start: 2023-03-18 | End: 2023-04-11

## 2023-03-18 RX ORDER — CEFPODOXIME PROXETIL 200 MG/1
200 TABLET, FILM COATED ORAL NIGHTLY
Qty: 8 TABLET | Refills: 0 | Status: SHIPPED | OUTPATIENT
Start: 2023-03-18 | End: 2023-03-18 | Stop reason: SDUPTHER

## 2023-03-18 RX ORDER — CEFPODOXIME PROXETIL 200 MG/1
200 TABLET, FILM COATED ORAL NIGHTLY
Status: DISCONTINUED | OUTPATIENT
Start: 2023-03-18 | End: 2023-03-18 | Stop reason: HOSPADM

## 2023-03-18 RX ORDER — CETIRIZINE HYDROCHLORIDE 5 MG/1
5 TABLET ORAL 2 TIMES DAILY PRN
Status: DISCONTINUED | OUTPATIENT
Start: 2023-03-18 | End: 2023-03-18 | Stop reason: HOSPADM

## 2023-03-18 RX ADMIN — APIXABAN 5 MG: 5 TABLET, FILM COATED ORAL at 09:03

## 2023-03-18 RX ADMIN — ONDANSETRON 8 MG: 8 TABLET, ORALLY DISINTEGRATING ORAL at 09:03

## 2023-03-18 RX ADMIN — DOXAZOSIN 4 MG: 2 TABLET ORAL at 09:03

## 2023-03-18 RX ADMIN — ASPIRIN 81 MG: 81 TABLET, COATED ORAL at 09:03

## 2023-03-18 RX ADMIN — TACROLIMUS 2 MG: 1 CAPSULE ORAL at 09:03

## 2023-03-18 RX ADMIN — ATORVASTATIN CALCIUM 10 MG: 10 TABLET, FILM COATED ORAL at 09:03

## 2023-03-18 RX ADMIN — NIFEDIPINE 60 MG: 30 TABLET, FILM COATED, EXTENDED RELEASE ORAL at 09:03

## 2023-03-18 RX ADMIN — FAMOTIDINE 20 MG: 20 TABLET ORAL at 09:03

## 2023-03-18 RX ADMIN — PREDNISONE 5 MG: 5 TABLET ORAL at 09:03

## 2023-03-18 RX ADMIN — METOPROLOL SUCCINATE 25 MG: 25 TABLET, EXTENDED RELEASE ORAL at 09:03

## 2023-03-18 RX ADMIN — POLYETHYLENE GLYCOL 3350 17 G: 17 POWDER, FOR SOLUTION ORAL at 09:03

## 2023-03-18 RX ADMIN — HYDRALAZINE HYDROCHLORIDE 50 MG: 25 TABLET, FILM COATED ORAL at 09:03

## 2023-03-18 RX ADMIN — CETIRIZINE HYDROCHLORIDE 5 MG: 5 TABLET, FILM COATED ORAL at 09:03

## 2023-03-18 RX ADMIN — FUROSEMIDE 80 MG: 20 TABLET ORAL at 09:03

## 2023-03-18 RX ADMIN — OXYCODONE HYDROCHLORIDE 5 MG: 5 TABLET ORAL at 09:03

## 2023-03-18 NOTE — PLAN OF CARE
Problem: Fluid and Electrolyte Imbalance (Acute Kidney Injury/Impairment)  Goal: Fluid and Electrolyte Balance  Outcome: Ongoing, Progressing     Problem: Oral Intake Inadequate (Acute Kidney Injury/Impairment)  Goal: Optimal Nutrition Intake  Outcome: Ongoing, Progressing     Problem: Renal Function Impairment (Acute Kidney Injury/Impairment)  Goal: Effective Renal Function  Outcome: Ongoing, Progressing     Problem: Skin Injury Risk Increased  Goal: Skin Health and Integrity  Outcome: Ongoing, Progressing   Pt AAO X 4; able to express needs.  NO c/o pain.  IV ABX given as ordered.  Hamilton Cath for retention.  Safety maintained.  Bed in low position,  call  light in reach.

## 2023-03-18 NOTE — ASSESSMENT & PLAN NOTE
Associated with pearson placed during previous admission. Pearson exchanged in ED. Ceftriaxone x1 given in ED  - Continue ceftriaxone 1g QD, switched to cefpodoxime 200mg for total course 10 days.   - f/u Bcx  - f/u Ucx (GNR on culture)  -Voiding trial successful

## 2023-03-18 NOTE — HOSPITAL COURSE
66 yo M with pmhx Kidney transplant x2 (1992 & 2005), CKD4, CAD, pAfib on eliquis, CHF, who presents for penile pain associated with pearson catheter. He had a pearson placed during his recent admission for KATINA and urinary retention. He was sent home w pearson in place 3/6.His wife noticed intermittent confusion during this period. It was discovered patient had a UTI. Placed on ceftriaxone for 2 days, transitioned to cefpodoxime for a total of 10 day course with his complicating factor. Mental status improved with antibiotics. His pearson was removed, patient able to void after removal. Pt able to be discharged with oral antibiotics. Discussed what to do if he begins having fever, burning or urinary retention. Pt and wife demonstrated understanding. D/C on 3/18

## 2023-03-18 NOTE — SUBJECTIVE & OBJECTIVE
Subjective:   History of Present Illness:  66 yo M with pmhx Kidney transplant x2 (1992 & 2005), CKD, CAD, pAfib on eliquis, CHF, who presents for penile pain associated with pearson catheter. Patient found to have acute UTI. Pearson exchanged and started on IV abx. Recently discharged with KATINA and urinary retention on 03/06. At that time Scr ~4.6 and KATINA thought to be 2/2 to progressive kidney transplant failure. Patient did not want HD and palliative team was consulted. On admission Scr now 3.4. No severe electrolyte abnormality. Transplant nephro consulted for history of renal transplant and IS management.     Mr. Phelps is a 67 y.o. year old male who is status post Kidney Transplant - 4/12/2005 - Not Followed  (#2).    His maintenance immunosuppression consists of:   Immunosuppressants (From admission, onward)      Start     Stop Route Frequency Ordered    03/17/23 0800  tacrolimus capsule 2 mg         -- Oral 2 times daily 03/16/23 2338            Hospital Course:  No notes on file    Interval History:  Stable creatinine. Patient still with trouble with memory but in good spirits.    Past Medical, Surgical, Family, and Social History:   Unchanged from H&P.    Scheduled Meds:   apixaban  5 mg Oral BID    aspirin  81 mg Oral Daily    atorvastatin  10 mg Oral Daily    cefpodoxime  200 mg Oral QHS    doxazosin  4 mg Oral Daily    famotidine  20 mg Oral Daily    furosemide  80 mg Oral BID    hydrALAZINE  50 mg Oral TID    metoprolol succinate  25 mg Oral Daily    NIFEdipine  60 mg Oral BID    polyethylene glycol  17 g Oral Daily    predniSONE  5 mg Oral Daily    tacrolimus  2 mg Oral BID     Continuous Infusions:  PRN Meds:acetaminophen, acetaminophen, cetirizine, dextrose 10%, dextrose 10%, dextrose, dextrose, glucagon (human recombinant), melatonin, naloxone, ondansetron, oxyCODONE, sodium chloride 0.9%    Intake/Output - Last 3 Shifts         03/16 0700 03/17 0659 03/17 0700 03/18 0659 03/18 0700 03/19 0659     "P.O. 120 970 100    IV Piggyback  45.4     Total Intake(mL/kg) 120 (1.2) 1015.4 (10.4) 100 (1)    Urine (mL/kg/hr) 400 2600 (1.1) 100 (0.2)    Emesis/NG output 0      Stool 0  0    Total Output 400 2600 100    Net -280 -1584.6 0           Stool Occurrence  0 x 0 x             Review of Systems   Constitutional: Negative.    HENT: Negative.     Eyes: Negative.    Respiratory: Negative.     Cardiovascular: Negative.    Gastrointestinal: Negative.    Endocrine: Negative.    Genitourinary: Negative.    Musculoskeletal: Negative.    Skin: Negative.    Neurological: Negative.    Psychiatric/Behavioral: Negative.      Objective:     Vital Signs (Most Recent):  Temp: 98.2 °F (36.8 °C) (03/18/23 1134)  Pulse: 86 (03/18/23 1134)  Resp: 18 (03/18/23 1134)  BP: (!) 146/79 (03/18/23 1134)  SpO2: (!) 91 % (03/18/23 1134)   Vital Signs (24h Range):  Temp:  [96.7 °F (35.9 °C)-99.6 °F (37.6 °C)] 98.2 °F (36.8 °C)  Pulse:  [69-89] 86  Resp:  [17-22] 18  SpO2:  [91 %-97 %] 91 %  BP: (134-170)/(60-79) 146/79     Weight: 98 kg (215 lb 15.8 oz)  Height: 6' 1" (185.4 cm)  Body mass index is 28.5 kg/m².    Physical Exam  Vitals and nursing note reviewed.   Constitutional:       General: He is not in acute distress.     Appearance: He is obese.   Eyes:      General: No scleral icterus.  Cardiovascular:      Rate and Rhythm: Normal rate.   Pulmonary:      Effort: Pulmonary effort is normal. No respiratory distress.   Abdominal:      General: There is no distension.      Palpations: Abdomen is soft.   Musculoskeletal:      Right lower leg: No edema.      Left lower leg: No edema.   Skin:     Coloration: Skin is not jaundiced.   Neurological:      Mental Status: He is alert.       Laboratory:  Labs within the past 24 hours have been reviewed.    Diagnostic Results:  None  "

## 2023-03-18 NOTE — DISCHARGE SUMMARY
[facility] - Intensive Care ([place] [address])  [facility] Medicine  Discharge Summary      Patient Name: Ibrahima Phelps  MRN: [id number]  SHAVONNE: [id number]  Patient Class: OP- Observation  Admission Date: 3/16/2023  Hospital Length of Stay: 0 days  Discharge Date and Time:  03/18/2023 1:56 PM  Attending Physician: Hussain Silva MD   Discharging Provider: Tylor Obrien DO  Primary Care Provider: Connie Magdaleno MD  [facility] Team: [facility] Tylor Obrien DO  Primary Care Team: [facility]    HPI:   68 yo M with pmhx Kidney transplant x2 (1992 & 2005), CKD, CAD, pAfib on eliquis, CHF, who presents for penile pain associated with pearson catheter. He had a pearson placed during his recent admission to [facility] for KATINA. He was sent home w pearson in place 3/6. He began having penile & bladder pain yesterday and sought evaluation. His wife has noticed intermittent confusion during this period. They deny any fevers, chills, chest pain, SOB, or other symptoms.     In the ED he was noted to have white discharge from the penis around the pearson. Pearson was exchanged and UA from new pearson showed bacteria, WBC, and RBC. He was given ceftriaxone x 1 in the ED. He is admitted to hospital medicine for UTI.       * No surgery found *      Hospital Course:    68 yo M with pmhx Kidney transplant x2 (1992 & 2005), CKD4, CAD, pAfib on eliquis, CHF, who presents for penile pain associated with pearson catheter. He had a pearson placed during his recent admission for KATINA and urinary retention. He was sent home w pearson in place 3/6.His wife noticed intermittent confusion during this period. It was discovered patient had a UTI. Placed on ceftriaxone for 2 days, transitioned to cefpodoxime for a total of 10 day course with his complicating factor. Mental status improved with antibiotics. His pearson was removed, patient able to void after removal. Pt able to be discharged with oral antibiotics. Discussed what to do if he begins having fever,  burning or urinary retention. Pt and wife demonstrated understanding. D/C on 3/18       Goals of Care Treatment Preferences:  Code Status: Full Code    Health care agent: Pt's wife is CRISTY  Health care agent number: No value filed.          What is most important right now is to focus on spending time at home, remaining as independent as possible, symptom/pain control, quality of life, even if it means sacrificing a little time.  Accordingly, we have decided that the best plan to meet the patient's goals includes continuing with treatment.      Consults:   Consults (From admission, onward)        Status Ordering Provider     Inpatient consult to Kidney/Pancreas Transplant Medicine  Once        Provider:  (Not yet assigned)    Completed ENMANUEL GREENWOOD          Renal/  * Urinary tract infection associated with indwelling urethral catheter  Associated with pearson placed during previous admission. Pearson exchanged in ED. Ceftriaxone x1 given in ED  - Continue ceftriaxone 1g QD, switched to cefpodoxime 200mg for total course 10 days.   - f/u Bcx  - f/u Ucx (GNR on culture)  -Voiding trial successful      Final Active Diagnoses:    Diagnosis Date Noted POA    PRINCIPAL PROBLEM:  Urinary tract infection associated with indwelling urethral catheter [T83.511A, N39.0] 03/17/2023 Yes    Immunodeficiency due to treatment with immunosuppressive medication [D84.821, Z79.899] 03/17/2023 Not Applicable    CKD (chronic kidney disease) stage 4, GFR 15-29 ml/min [N18.4] 03/17/2023 Yes    Chronic diastolic CHF (congestive heart failure) [I50.32] 03/17/2023 Yes    Kidney replaced by transplant [Z94.0] 04/26/2021 Not Applicable    Other emphysema [J43.8] 10/01/2019 Yes    Paroxysmal atrial fibrillation [I48.0] 09/25/2017 Yes    Hypertension [I10]  Yes    CAD (coronary artery disease) [I25.10]  Yes    Mixed hyperlipidemia [E78.2]  Yes      Problems Resolved During this Admission:    Diagnosis Date Noted Date Resolved POA     Urinary retention [R33.9] 03/17/2023 03/18/2023 Yes       Discharged Condition: stable    Disposition:     Follow Up:    Patient Instructions:   No discharge procedures on file.    Significant Diagnostic Studies: Labs:   CMP   Recent Labs   Lab 03/16/23 1942 03/17/23 0243 03/18/23  0619    140 140   K 4.3 3.9 4.0    107 107   CO2 22* 24 23   * 73 78   BUN 39* 37* 31*   CREATININE 3.7* 3.4* 3.2*   CALCIUM 8.2* 7.6* 7.8*   PROT 8.6* 6.8 6.8   ALBUMIN 2.7* 2.2* 2.2*   BILITOT 0.3 0.3 0.3   ALKPHOS 70 57 58   AST 28 21 22   ALT 24 17 17   ANIONGAP 14 9 10    and CBC   Recent Labs   Lab 03/16/23 1942 03/17/23 0243 03/18/23  0619   WBC 8.10 7.59 8.17   HGB 9.5* 8.1* 7.9*   HCT 33.8* 28.1* 28.3*    324 277       Pending Diagnostic Studies:     None         Medications:  Reconciled Home Medications:      Medication List      START taking these medications    cefpodoxime 200 MG tablet  Commonly known as: VANTIN  Take 1 tablet (200 mg total) by mouth every evening.     cetirizine 5 MG tablet  Commonly known as: ZYRTEC  Take 1 tablet (5 mg total) by mouth 2 (two) times daily as needed for Allergies.        CONTINUE taking these medications    acetaminophen 500 MG tablet  Commonly known as: TYLENOL  Take 1 tablet (500 mg total) by mouth every 6 (six) hours as needed for Pain.     ADVAIR -21 mcg/actuation Hfaa inhaler  Generic drug: fluticasone propion-salmeterol 115-21 mcg/dose  Inhale 2 puffs into the lungs every 12 (twelve) hours. Controller     * albuterol 90 mcg/actuation inhaler  Commonly known as: VENTOLIN HFA  Inhale 2 puffs into the lungs every 6 (six) hours as needed for Wheezing or Shortness of Breath. Rescue     * albuterol 2.5 mg /3 mL (0.083 %) nebulizer solution  Commonly known as: PROVENTIL  Take 3 mLs (2.5 mg total) by nebulization every 6 (six) hours as needed for Wheezing. Rescue     ammonium lactate 12 % Crea  Apply 1 g topically once daily.     aspirin 81 MG EC  tablet  Commonly known as: ECOTRIN  Take 1 tablet by mouth Daily.     atorvastatin 10 MG tablet  Commonly known as: LIPITOR  Take 10 mg by mouth.     CALCIUM 600 ORAL  Take by mouth. Pt taking 1200 daily     colchicine 0.6 mg Cap  Commonly known as: MITIGARE  One po BID prn gout flare up to 3 days     doxazosin 4 MG tablet  Commonly known as: CARDURA  TAKE 1 TABLET EVERY 12 HOURS     ELIQUIS 5 mg Tab  Generic drug: apixaban  TAKE 1 TABLET BY MOUTH TWICE A DAY     famotidine 20 MG tablet  Commonly known as: PEPCID  TAKE 1 TABLET TWICE DAILY     fluticasone propionate 50 mcg/actuation nasal spray  Commonly known as: FLONASE  1 spray (50 mcg total) by Each Nostril route once daily.     furosemide 80 MG tablet  Commonly known as: LASIX  Take 1 tablet (80 mg total) by mouth 2 (two) times daily.     gabapentin 100 MG capsule  Commonly known as: NEURONTIN  Take 1 capsule (100 mg total) by mouth every evening.     hydrALAZINE 50 MG tablet  Commonly known as: APRESOLINE  TAKE 1 TABLET (50 MG TOTAL) BY MOUTH 3 (THREE) TIMES DAILY.     LIDOcaine 5 %  Commonly known as: LIDODERM  Place 1 patch onto the skin daily as needed (back pain). Remove & Discard patch within 12 hours or as directed by MD     metoprolol succinate 25 MG 24 hr tablet  Commonly known as: TOPROL-XL  Take 1 tablet (25 mg total) by mouth once daily.     multivitamin per tablet  Commonly known as: THERAGRAN  Take 1 tablet by mouth Daily.     NIFEdipine 60 MG (OSM) 24 hr tablet  Commonly known as: PROCARDIA-XL  Take 1 tablet (60 mg total) by mouth 2 (two) times a day.     omeprazole 20 MG capsule  Commonly known as: PRILOSEC  Take 1 capsule (20 mg total) by mouth once daily.     paricalcitoL 1 MCG capsule  Commonly known as: ZEMPLAR  TAKE 1 CAPSULE ON MONDAY, WEDNESDAY AND FRIDAY     predniSONE 5 MG tablet  Commonly known as: DELTASONE  TAKE 1 TABLET BY MOUTH EVERY DAY IN THE MORNING     pulse oximeter device  Commonly known as: pulse oximeter  by Apply  Externally route 2 (two) times a day. Use twice daily at 8 AM and 3 PM and record the value in MyChart as directed.     tacrolimus 1 MG Cap  Commonly known as: PROGRAF  Take 2 capsules (2 mg total) by mouth every morning AND 2 capsules (2 mg total) every evening.     vitamin D 1000 units Tab  Commonly known as: VITAMIN D3  Take 370 mg by mouth 2 (two) times daily. 2 tablets BID         * This list has 2 medication(s) that are the same as other medications prescribed for you. Read the directions carefully, and ask your doctor or other care provider to review them with you.                Indwelling Lines/Drains at time of discharge:   Lines/Drains/Airways     None                 Time spent on the discharge of patient: 40 minutes         Tylor Obrien DO  Department of Hospital Medicine  [facility] - Intensive Care ([place]-[facility])

## 2023-03-19 LAB — BACTERIA UR CULT: ABNORMAL

## 2023-03-20 ENCOUNTER — TELEPHONE (OUTPATIENT)
Dept: HEPATOLOGY | Facility: HOSPITAL | Age: 68
End: 2023-03-20
Payer: MEDICARE

## 2023-03-20 ENCOUNTER — PATIENT MESSAGE (OUTPATIENT)
Dept: INTERNAL MEDICINE | Facility: CLINIC | Age: 68
End: 2023-03-20

## 2023-03-20 DIAGNOSIS — T83.511D URINARY TRACT INFECTION ASSOCIATED WITH CATHETERIZATION OF URINARY TRACT, UNSPECIFIED INDWELLING URINARY CATHETER TYPE, SUBSEQUENT ENCOUNTER: ICD-10-CM

## 2023-03-20 DIAGNOSIS — N39.0 URINARY TRACT INFECTION ASSOCIATED WITH CATHETERIZATION OF URINARY TRACT, UNSPECIFIED INDWELLING URINARY CATHETER TYPE, SUBSEQUENT ENCOUNTER: ICD-10-CM

## 2023-03-20 RX ORDER — CIPROFLOXACIN 500 MG/1
750 TABLET ORAL 2 TIMES DAILY
Qty: 21 TABLET | Refills: 0 | Status: SHIPPED | OUTPATIENT
Start: 2023-03-20 | End: 2023-03-20

## 2023-03-20 RX ORDER — CIPROFLOXACIN 500 MG/1
750 TABLET ORAL DAILY
Qty: 11 TABLET | Refills: 0 | Status: SHIPPED | OUTPATIENT
Start: 2023-03-20 | End: 2023-03-27

## 2023-03-20 NOTE — TELEPHONE ENCOUNTER
Pt urine culture came back positive for pseudomonas. Pt was called and antibiotics was switched from cefpodoxime to ciprofloxacin 750mg daily x7 days. Family was called and made aware.    Tylor Obrien, DO  PGY1  [facility] Medicine

## 2023-03-21 LAB
BACTERIA BLD CULT: NORMAL
BACTERIA BLD CULT: NORMAL

## 2023-03-28 ENCOUNTER — PATIENT MESSAGE (OUTPATIENT)
Dept: NEPHROLOGY | Facility: CLINIC | Age: 68
End: 2023-03-28
Payer: MEDICARE

## 2023-04-03 ENCOUNTER — PATIENT MESSAGE (OUTPATIENT)
Dept: ADMINISTRATIVE | Facility: HOSPITAL | Age: 68
End: 2023-04-03
Payer: MEDICARE

## 2023-04-09 ENCOUNTER — PATIENT MESSAGE (OUTPATIENT)
Dept: NEPHROLOGY | Facility: CLINIC | Age: 68
End: 2023-04-09
Payer: MEDICARE

## 2023-04-09 DIAGNOSIS — Z94.0 KIDNEY REPLACED BY TRANSPLANT: ICD-10-CM

## 2023-04-10 RX ORDER — TACROLIMUS 1 MG/1
CAPSULE ORAL
Qty: 270 CAPSULE | Refills: 3 | Status: ON HOLD | OUTPATIENT
Start: 2023-04-10 | End: 2023-10-20 | Stop reason: HOSPADM

## 2023-04-11 ENCOUNTER — OFFICE VISIT (OUTPATIENT)
Dept: INTERNAL MEDICINE | Facility: CLINIC | Age: 68
End: 2023-04-11
Payer: MEDICARE

## 2023-04-11 ENCOUNTER — LAB VISIT (OUTPATIENT)
Dept: LAB | Facility: HOSPITAL | Age: 68
End: 2023-04-11
Payer: MEDICARE

## 2023-04-11 VITALS
WEIGHT: 213.38 LBS | DIASTOLIC BLOOD PRESSURE: 60 MMHG | SYSTOLIC BLOOD PRESSURE: 124 MMHG | HEIGHT: 73 IN | BODY MASS INDEX: 28.28 KG/M2 | HEART RATE: 82 BPM | OXYGEN SATURATION: 99 %

## 2023-04-11 DIAGNOSIS — Z09 HOSPITAL DISCHARGE FOLLOW-UP: ICD-10-CM

## 2023-04-11 DIAGNOSIS — Z09 HOSPITAL DISCHARGE FOLLOW-UP: Primary | ICD-10-CM

## 2023-04-11 DIAGNOSIS — Z87.440 HISTORY OF UTI: ICD-10-CM

## 2023-04-11 DIAGNOSIS — N18.4 CKD (CHRONIC KIDNEY DISEASE) STAGE 4, GFR 15-29 ML/MIN: ICD-10-CM

## 2023-04-11 DIAGNOSIS — Z94.0 KIDNEY REPLACED BY TRANSPLANT: ICD-10-CM

## 2023-04-11 LAB
ANION GAP SERPL CALC-SCNC: 12 MMOL/L (ref 8–16)
BACTERIA #/AREA URNS AUTO: ABNORMAL /HPF
BILIRUB UR QL STRIP: NEGATIVE
BUN SERPL-MCNC: 32 MG/DL (ref 8–23)
CALCIUM SERPL-MCNC: 8.6 MG/DL (ref 8.7–10.5)
CHLORIDE SERPL-SCNC: 106 MMOL/L (ref 95–110)
CLARITY UR REFRACT.AUTO: CLEAR
CO2 SERPL-SCNC: 21 MMOL/L (ref 23–29)
COLOR UR AUTO: YELLOW
CREAT SERPL-MCNC: 2.6 MG/DL (ref 0.5–1.4)
EST. GFR  (NO RACE VARIABLE): 26 ML/MIN/1.73 M^2
GLUCOSE SERPL-MCNC: 99 MG/DL (ref 70–110)
GLUCOSE UR QL STRIP: NEGATIVE
HGB UR QL STRIP: NEGATIVE
HYALINE CASTS UR QL AUTO: 22 /LPF
KETONES UR QL STRIP: NEGATIVE
LEUKOCYTE ESTERASE UR QL STRIP: NEGATIVE
MICROSCOPIC COMMENT: ABNORMAL
NITRITE UR QL STRIP: NEGATIVE
PH UR STRIP: 6 [PH] (ref 5–8)
POTASSIUM SERPL-SCNC: 4.3 MMOL/L (ref 3.5–5.1)
PROT UR QL STRIP: ABNORMAL
RBC #/AREA URNS AUTO: 1 /HPF (ref 0–4)
SODIUM SERPL-SCNC: 139 MMOL/L (ref 136–145)
SP GR UR STRIP: 1.01 (ref 1–1.03)
URN SPEC COLLECT METH UR: ABNORMAL
WBC #/AREA URNS AUTO: 4 /HPF (ref 0–5)

## 2023-04-11 PROCEDURE — 81001 URINALYSIS AUTO W/SCOPE: CPT | Performed by: PHYSICIAN ASSISTANT

## 2023-04-11 PROCEDURE — 3062F PR POS MACROALBUMINURIA RESULT DOCUMENTED/REVIEW: ICD-10-PCS | Mod: CPTII,S$GLB,, | Performed by: PHYSICIAN ASSISTANT

## 2023-04-11 PROCEDURE — 1101F PR PT FALLS ASSESS DOC 0-1 FALLS W/OUT INJ PAST YR: ICD-10-PCS | Mod: CPTII,S$GLB,, | Performed by: PHYSICIAN ASSISTANT

## 2023-04-11 PROCEDURE — 3078F DIAST BP <80 MM HG: CPT | Mod: CPTII,S$GLB,, | Performed by: PHYSICIAN ASSISTANT

## 2023-04-11 PROCEDURE — 3008F BODY MASS INDEX DOCD: CPT | Mod: CPTII,S$GLB,, | Performed by: PHYSICIAN ASSISTANT

## 2023-04-11 PROCEDURE — 3066F PR DOCUMENTATION OF TREATMENT FOR NEPHROPATHY: ICD-10-PCS | Mod: CPTII,S$GLB,, | Performed by: PHYSICIAN ASSISTANT

## 2023-04-11 PROCEDURE — 87086 URINE CULTURE/COLONY COUNT: CPT | Performed by: PHYSICIAN ASSISTANT

## 2023-04-11 PROCEDURE — 36415 COLL VENOUS BLD VENIPUNCTURE: CPT | Performed by: PHYSICIAN ASSISTANT

## 2023-04-11 PROCEDURE — 3044F PR MOST RECENT HEMOGLOBIN A1C LEVEL <7.0%: ICD-10-PCS | Mod: CPTII,S$GLB,, | Performed by: PHYSICIAN ASSISTANT

## 2023-04-11 PROCEDURE — 3074F SYST BP LT 130 MM HG: CPT | Mod: CPTII,S$GLB,, | Performed by: PHYSICIAN ASSISTANT

## 2023-04-11 PROCEDURE — 1126F PR PAIN SEVERITY QUANTIFIED, NO PAIN PRESENT: ICD-10-PCS | Mod: CPTII,S$GLB,, | Performed by: PHYSICIAN ASSISTANT

## 2023-04-11 PROCEDURE — 3288F FALL RISK ASSESSMENT DOCD: CPT | Mod: CPTII,S$GLB,, | Performed by: PHYSICIAN ASSISTANT

## 2023-04-11 PROCEDURE — 1159F PR MEDICATION LIST DOCUMENTED IN MEDICAL RECORD: ICD-10-PCS | Mod: CPTII,S$GLB,, | Performed by: PHYSICIAN ASSISTANT

## 2023-04-11 PROCEDURE — 3288F PR FALLS RISK ASSESSMENT DOCUMENTED: ICD-10-PCS | Mod: CPTII,S$GLB,, | Performed by: PHYSICIAN ASSISTANT

## 2023-04-11 PROCEDURE — 1159F MED LIST DOCD IN RCRD: CPT | Mod: CPTII,S$GLB,, | Performed by: PHYSICIAN ASSISTANT

## 2023-04-11 PROCEDURE — 80048 BASIC METABOLIC PNL TOTAL CA: CPT | Performed by: PHYSICIAN ASSISTANT

## 2023-04-11 PROCEDURE — 1160F RVW MEDS BY RX/DR IN RCRD: CPT | Mod: CPTII,S$GLB,, | Performed by: PHYSICIAN ASSISTANT

## 2023-04-11 PROCEDURE — 99999 PR PBB SHADOW E&M-EST. PATIENT-LVL V: CPT | Mod: PBBFAC,,, | Performed by: PHYSICIAN ASSISTANT

## 2023-04-11 PROCEDURE — 3074F PR MOST RECENT SYSTOLIC BLOOD PRESSURE < 130 MM HG: ICD-10-PCS | Mod: CPTII,S$GLB,, | Performed by: PHYSICIAN ASSISTANT

## 2023-04-11 PROCEDURE — 99999 PR PBB SHADOW E&M-EST. PATIENT-LVL V: ICD-10-PCS | Mod: PBBFAC,,, | Performed by: PHYSICIAN ASSISTANT

## 2023-04-11 PROCEDURE — 3066F NEPHROPATHY DOC TX: CPT | Mod: CPTII,S$GLB,, | Performed by: PHYSICIAN ASSISTANT

## 2023-04-11 PROCEDURE — 1126F AMNT PAIN NOTED NONE PRSNT: CPT | Mod: CPTII,S$GLB,, | Performed by: PHYSICIAN ASSISTANT

## 2023-04-11 PROCEDURE — 99214 PR OFFICE/OUTPT VISIT, EST, LEVL IV, 30-39 MIN: ICD-10-PCS | Mod: S$GLB,,, | Performed by: PHYSICIAN ASSISTANT

## 2023-04-11 PROCEDURE — 3062F POS MACROALBUMINURIA REV: CPT | Mod: CPTII,S$GLB,, | Performed by: PHYSICIAN ASSISTANT

## 2023-04-11 PROCEDURE — 1160F PR REVIEW ALL MEDS BY PRESCRIBER/CLIN PHARMACIST DOCUMENTED: ICD-10-PCS | Mod: CPTII,S$GLB,, | Performed by: PHYSICIAN ASSISTANT

## 2023-04-11 PROCEDURE — 3044F HG A1C LEVEL LT 7.0%: CPT | Mod: CPTII,S$GLB,, | Performed by: PHYSICIAN ASSISTANT

## 2023-04-11 PROCEDURE — 3078F PR MOST RECENT DIASTOLIC BLOOD PRESSURE < 80 MM HG: ICD-10-PCS | Mod: CPTII,S$GLB,, | Performed by: PHYSICIAN ASSISTANT

## 2023-04-11 PROCEDURE — 99214 OFFICE O/P EST MOD 30 MIN: CPT | Mod: S$GLB,,, | Performed by: PHYSICIAN ASSISTANT

## 2023-04-11 PROCEDURE — 1101F PT FALLS ASSESS-DOCD LE1/YR: CPT | Mod: CPTII,S$GLB,, | Performed by: PHYSICIAN ASSISTANT

## 2023-04-11 PROCEDURE — 3008F PR BODY MASS INDEX (BMI) DOCUMENTED: ICD-10-PCS | Mod: CPTII,S$GLB,, | Performed by: PHYSICIAN ASSISTANT

## 2023-04-12 ENCOUNTER — PATIENT MESSAGE (OUTPATIENT)
Dept: NEPHROLOGY | Facility: CLINIC | Age: 68
End: 2023-04-12
Payer: MEDICARE

## 2023-04-13 LAB — BACTERIA UR CULT: NO GROWTH

## 2023-04-14 NOTE — PROGRESS NOTES
Subjective     Patient ID: Ibrahima Phelps is a 67 y.o. male.    Chief Complaint: Follow-up (Hosp followup)    HPI    Established pt of Connie Magdaleno MD (new to me)      Here for hospital discharge follow up.   Attended by spouse    Admission Date: 3/16/2023  Discharge Date and Time:  2023     Hospital Course:    66 yo M with pmhx Kidney transplant x2 ( & ), CKD4, CAD, pAfib on eliquis, CHF, who presents for penile pain associated with pearson catheter. He had a pearson placed during his recent admission for KATINA and urinary retention. He was sent home w pearson in place 3/6.His wife noticed intermittent confusion during this period. It was discovered patient had a UTI. Placed on ceftriaxone for 2 days, transitioned to cefpodoxime for a total of 10 day course with his complicating factor. Mental status improved with antibiotics. His pearson was removed, patient able to void after removal. Pt able to be discharged with oral antibiotics. Discussed what to do if he begins having fever, burning or urinary retention. Pt and wife demonstrated understanding. D/C on 3/18        Today pt states he is feeling well. No acute complaints  Follows with Nephrology in Dr. Bhvaya Vanegas    Past Medical History:   Diagnosis Date    Atrial fibrillation     CAD (coronary artery disease)     MI in     CHF (congestive heart failure)     CKD (chronic kidney disease) stage 3, GFR 30-59 ml/min     Dr. Latif.  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     Patient is currently retired   Substance Use Topics    Alcohol use: No    Drug use: No     Review of patient's allergies indicates:   Allergen Reactions    Ace inhibitors Swelling    Verapamil Other (See Comments)     Other reaction(s): Unknown    Povidone-iodine Itching       Review of Systems   Constitutional:  Negative for chills, fever and unexpected weight change.   Respiratory:  Negative for cough and shortness of breath.    Cardiovascular:  Negative for chest pain and leg swelling.   Gastrointestinal:  Negative for abdominal pain, nausea and vomiting.   Genitourinary:  Positive for dysuria (occ, but improved). Negative for discharge and hematuria.   Integumentary:  Negative for rash.   Neurological:  Negative for weakness and headaches.        Objective  /60 (BP Location: Right arm, Patient Position: Sitting, BP Method: Medium (Manual))   Pulse 82   Ht 6' 1" (1.854 m)   Wt 96.8 kg (213 lb 6.5 oz)   SpO2 99%   BMI 28.16 kg/m²       Physical Exam  Vitals reviewed.   Constitutional:       General: He is not in acute distress.     Appearance: He is well-developed.   HENT:      Head: Normocephalic and atraumatic.      Right Ear: Tympanic membrane, ear canal and external ear normal.      Left Ear: Tympanic membrane, ear canal and external ear normal.   Cardiovascular:      Rate and Rhythm: Normal rate and regular rhythm.      Heart sounds: No murmur heard.  Pulmonary:      Effort: Pulmonary effort is normal.      Breath sounds: Normal breath sounds. No wheezing or rales.   Abdominal:      General: Bowel sounds are normal.      Palpations: Abdomen is soft.      Tenderness: There is no abdominal tenderness.   Musculoskeletal:      Right lower leg: No edema.      Left lower leg: No edema.   Skin:     General: Skin is warm and dry.      Findings: No rash.   Neurological:      " Mental Status: He is alert.   Psychiatric:         Mood and Affect: Mood normal.     Hospital progress notes, lab and imaging personally reviewed      Assessment and Plan     Problem List Items Addressed This Visit          Renal/    CKD (chronic kidney disease) stage 4, GFR 15-29 ml/min    Relevant Orders    Urine culture (Completed)    Urinalysis (Completed)    BASIC METABOLIC PANEL (Completed)     Other Visit Diagnoses       Hospital discharge follow-up    -  Primary    Relevant Orders    Urine culture (Completed)    Urinalysis (Completed)    BASIC METABOLIC PANEL (Completed)    History of UTI        Relevant Orders    Urine culture (Completed)    Urinalysis (Completed)    BASIC METABOLIC PANEL (Completed)            Ibrahima was seen today for follow-up.    Diagnoses and all orders for this visit:    Hospital discharge follow-up  -     Urine culture  -     Urinalysis  -     BASIC METABOLIC PANEL; Future    CKD (chronic kidney disease) stage 4, GFR 15-29 ml/min (followed by outside Nephrology)  -     Urine culture  -     Urinalysis  -     BASIC METABOLIC PANEL; Future    History of UTI  -     Urine culture  -     Urinalysis  -     BASIC METABOLIC PANEL; Future    Kidney replaced by transplant (followed by transplant)    Other orders  -     Urinalysis Microscopic      Pt doing well since discharge, completed abx  Recheck UA and BMP as above  Pt desires new PCP  Schedule with Dr. Colindres as below.     Future Appointments   Date Time Provider Department Center   5/3/2023  8:40 AM SPECIMEN, [facility] SPECLAB [facility]   5/3/2023  8:55 AM [facility]   6/13/2023  9:00 AM [facility]   6/20/2023  9:40 AM Marcos Brunson MD [facility] PAM EPI [facility]     Addendum:  Urine Cx: No growth  Improved Cr 2.6 (prior at d/c 3.2)    Ngozi Hale PA-C

## 2023-05-03 ENCOUNTER — LAB VISIT (OUTPATIENT)
Dept: LAB | Facility: HOSPITAL | Age: 68
End: 2023-05-03
Attending: INTERNAL MEDICINE
Payer: MEDICARE

## 2023-05-03 DIAGNOSIS — N18.4 CKD (CHRONIC KIDNEY DISEASE) STAGE 4, GFR 15-29 ML/MIN: ICD-10-CM

## 2023-05-03 DIAGNOSIS — E11.9 TYPE 2 DIABETES MELLITUS WITHOUT COMPLICATION: ICD-10-CM

## 2023-05-03 LAB
ALBUMIN/CREAT UR: 1528 UG/MG (ref 0–30)
CREAT UR-MCNC: 143 MG/DL (ref 23–375)
MICROALBUMIN UR DL<=1MG/L-MCNC: 2185 UG/ML

## 2023-05-03 PROCEDURE — 82570 ASSAY OF URINE CREATININE: CPT | Performed by: INTERNAL MEDICINE

## 2023-05-04 ENCOUNTER — PES CALL (OUTPATIENT)
Dept: ADMINISTRATIVE | Facility: OTHER | Age: 68
End: 2023-05-04
Payer: MEDICARE

## 2023-05-10 ENCOUNTER — OFFICE VISIT (OUTPATIENT)
Dept: NEPHROLOGY | Facility: CLINIC | Age: 68
End: 2023-05-10
Payer: MEDICARE

## 2023-05-10 DIAGNOSIS — I50.32 CHRONIC DIASTOLIC HEART FAILURE: ICD-10-CM

## 2023-05-10 DIAGNOSIS — D63.1 ANEMIA DUE TO STAGE 4 CHRONIC KIDNEY DISEASE: ICD-10-CM

## 2023-05-10 DIAGNOSIS — Z79.899 IMMUNOSUPPRESSIVE MANAGEMENT ENCOUNTER FOLLOWING KIDNEY TRANSPLANT: ICD-10-CM

## 2023-05-10 DIAGNOSIS — R73.03 PREDIABETES: ICD-10-CM

## 2023-05-10 DIAGNOSIS — Z87.891 FORMER SMOKER: ICD-10-CM

## 2023-05-10 DIAGNOSIS — N18.4 ANEMIA DUE TO STAGE 4 CHRONIC KIDNEY DISEASE: ICD-10-CM

## 2023-05-10 DIAGNOSIS — I13.0 HYPERTENSIVE HEART AND RENAL DISEASE WITH RENAL FAILURE, STAGE 1 THROUGH STAGE 4 OR UNSPECIFIED CHRONIC KIDNEY DISEASE, WITH HEART FAILURE: ICD-10-CM

## 2023-05-10 DIAGNOSIS — I48.0 PAROXYSMAL ATRIAL FIBRILLATION: ICD-10-CM

## 2023-05-10 DIAGNOSIS — N18.4 CKD (CHRONIC KIDNEY DISEASE) STAGE 4, GFR 15-29 ML/MIN: Primary | ICD-10-CM

## 2023-05-10 DIAGNOSIS — Z94.0 KIDNEY REPLACED BY TRANSPLANT: ICD-10-CM

## 2023-05-10 DIAGNOSIS — N25.81 SECONDARY RENAL HYPERPARATHYROIDISM: ICD-10-CM

## 2023-05-10 DIAGNOSIS — E55.9 VITAMIN D DEFICIENCY: ICD-10-CM

## 2023-05-10 DIAGNOSIS — Z94.0 IMMUNOSUPPRESSIVE MANAGEMENT ENCOUNTER FOLLOWING KIDNEY TRANSPLANT: ICD-10-CM

## 2023-05-10 DIAGNOSIS — Z94.0 KIDNEY REPLACED BY TRANSPLANT: Primary | ICD-10-CM

## 2023-05-10 DIAGNOSIS — Z94.0 H/O KIDNEY TRANSPLANT: ICD-10-CM

## 2023-05-10 DIAGNOSIS — I10 PRIMARY HYPERTENSION: ICD-10-CM

## 2023-05-10 PROCEDURE — 3062F PR POS MACROALBUMINURIA RESULT DOCUMENTED/REVIEW: ICD-10-PCS | Mod: CPTII,95,, | Performed by: INTERNAL MEDICINE

## 2023-05-10 PROCEDURE — 1101F PR PT FALLS ASSESS DOC 0-1 FALLS W/OUT INJ PAST YR: ICD-10-PCS | Mod: CPTII,95,, | Performed by: INTERNAL MEDICINE

## 2023-05-10 PROCEDURE — 3066F NEPHROPATHY DOC TX: CPT | Mod: CPTII,95,, | Performed by: INTERNAL MEDICINE

## 2023-05-10 PROCEDURE — 3066F PR DOCUMENTATION OF TREATMENT FOR NEPHROPATHY: ICD-10-PCS | Mod: CPTII,95,, | Performed by: INTERNAL MEDICINE

## 2023-05-10 PROCEDURE — 3044F PR MOST RECENT HEMOGLOBIN A1C LEVEL <7.0%: ICD-10-PCS | Mod: CPTII,95,, | Performed by: INTERNAL MEDICINE

## 2023-05-10 PROCEDURE — 1101F PT FALLS ASSESS-DOCD LE1/YR: CPT | Mod: CPTII,95,, | Performed by: INTERNAL MEDICINE

## 2023-05-10 PROCEDURE — 3044F HG A1C LEVEL LT 7.0%: CPT | Mod: CPTII,95,, | Performed by: INTERNAL MEDICINE

## 2023-05-10 PROCEDURE — 3288F PR FALLS RISK ASSESSMENT DOCUMENTED: ICD-10-PCS | Mod: CPTII,95,, | Performed by: INTERNAL MEDICINE

## 2023-05-10 PROCEDURE — 3062F POS MACROALBUMINURIA REV: CPT | Mod: CPTII,95,, | Performed by: INTERNAL MEDICINE

## 2023-05-10 PROCEDURE — 3288F FALL RISK ASSESSMENT DOCD: CPT | Mod: CPTII,95,, | Performed by: INTERNAL MEDICINE

## 2023-05-10 PROCEDURE — 1125F AMNT PAIN NOTED PAIN PRSNT: CPT | Mod: CPTII,95,, | Performed by: INTERNAL MEDICINE

## 2023-05-10 PROCEDURE — 99215 OFFICE O/P EST HI 40 MIN: CPT | Mod: 95,,, | Performed by: INTERNAL MEDICINE

## 2023-05-10 PROCEDURE — 99215 PR OFFICE/OUTPT VISIT, EST, LEVL V, 40-54 MIN: ICD-10-PCS | Mod: 95,,, | Performed by: INTERNAL MEDICINE

## 2023-05-10 PROCEDURE — 1125F PR PAIN SEVERITY QUANTIFIED, PAIN PRESENT: ICD-10-PCS | Mod: CPTII,95,, | Performed by: INTERNAL MEDICINE

## 2023-05-10 RX ORDER — SPIRONOLACTONE 25 MG/1
TABLET ORAL
COMMUNITY
Start: 2023-04-29 | End: 2023-06-07 | Stop reason: CLARIF

## 2023-05-10 RX ORDER — MORPHINE SULFATE 2 MG/ML
INJECTION, SOLUTION INTRAMUSCULAR; INTRAVENOUS
COMMUNITY
Start: 2022-12-25 | End: 2023-06-20

## 2023-05-10 NOTE — PROGRESS NOTES
Subjective:       Patient ID: Ibrahima Phelps is a 67 y.o. Black or  male who presents for follow-up evaluation of Chronic Kidney Disease      HPI   He is getting over an URI, has not 'gone to his chest' but now feeling better. He continues to smoke a few cigarettes a day. Off PPI for several months now and without dyspepsia. No LE edema and no SOB.  He's noticed some allograft 'twinges' but no overt pain but no problems with urination. His wife is still working at [employer] as a manager of housing for residents.     Interval history March 2019: he has three concerns:  1. Worsening tremors. Spilling corn and trouble with writing  2. Increased nocturia  3. Cold intolerance    He denies recent illness. No allogarft pain and no LUTS other than nocturia. He has unintentionally lost weight. No new medications. He is doing well off PPI. He is smoking 3 cigarettes to 3/4ppd depending on the day.     Interval history July 2019: he was hospitalized for CP and was found to be in SVT, his potassium was low in ER. He was CP free in ER after X 1 SL NTG and after SVT broke. Since then he feels well. He has lost a little bit of weight. Still smoking     Interval history Nov 2019: feels poorly--has URI and lost voice, no sick contacts. Kidney txp is managing Prograf--Cr is higher than baseline but Prograf is running high. No LUTS and no allograft pain.     Interval history April 2020:  The patient location is: Home  The chief complaint leading to consultation is: CKD and kidney transplant recioient  Visit type: audiovisual  Total time spent with patient:  28 minutes  Each patient to whom he or she provides medical services by telemedicine is:  (1) informed of the relationship between the physician and patient and the respective role of any other health care provider with respect to management of the patient; and (2) notified that he or she may decline to receive medical services by telemedicine and may withdraw from such  care at any time.  Notes: He is doing well. He stays home, his wife does the shopping and errands. He was admitted to the hospital in late March for chest pain, felt to be musculoskeletal. They started hydralazine for BP, lowered too much at home with symptoms and Cards advised to cut in half. No LE edema. No allograft pain and no LUTS. He still takes his calcium as instructed.   Interval lhistory Aug 2020:  He feels well. No hospital stays since last visit. He notes a skin lesion to right wrist, not pruritic, he noted similar spots when he was on coumadin but not as many when he was on Eliquis, it is actually improving. It  doesn't justice. He had one day of allograft tenderness but no LUTS associated with it and no recurrence. No new medications.     Interval history Jan 2021:   The patient location is: Home  The chief complaint leading to consultation is: CKD and kidney transplant   Visit type: audiovisual  Face to Face time with patient: 32 minutes  51 minutes of total time spent on the encounter, which includes face to face time and non-face to face time preparing to see the patient (eg, review of tests), Obtaining and/or reviewing separately obtained history, Documenting clinical information in the electronic or other health record, Independently interpreting results (not separately reported) and communicating results to the patient/family/caregiver, or Care coordination (not separately reported).   Each patient to whom he or she provides medical services by telemedicine is:  (1) informed of the relationship between the physician and patient and the respective role of any other health care provider with respect to management of the patient; and (2) notified that he or she may decline to receive medical services by telemedicine and may withdraw from such care at any time.  Notes: He notes BP is high. He feels well. He remains feeling cold most of the time. No new medications.     Interval history July 2021: He  is doing well. Brings a BP log with the average above goal. No CP     Interval History Jan 2022: He is feeling well. No allograft pain. No recent ER visits. Tremors about the same--saw Neuro     Interval History June 2022: He saw ENT and diagnosed with BPV, iproving. New baseline allograft function, has seen txp very closely. RLE edema. Has history of smoking, QUIT in Feb 2022     Sep 2022: He is feeling well. ER visit for gout of elbow and ER visit for SOB improved with Lasix IV dose.     Jan 2023: Herniated disc flare, needs Flexeril, Abdominal muscle cramping felt to be due to hypocalcemia, but has GI follow up. Hb dropped but no black tarry stools    May 2023:  The patient location is: [place]  The chief complaint leading to consultation is: CKD, kidney txp status   Visit type: audiovisual  60 minutes of total time spent on the encounter, which includes face to face time and non-face to face time preparing to see the patient (eg, review of tests), Obtaining and/or reviewing separately obtained history, Documenting clinical information in the electronic or other health record, Independently interpreting results (not separately reported) and communicating results to the patient/family/caregiver, or Care coordination (not separately reported).   Each patient to whom he or she provides medical services by telemedicine is:  (1) informed of the relationship between the physician and patient and the respective role of any other health care provider with respect to management of the patient; and (2) notified that he or she may decline to receive medical services by telemedicine and may withdraw from such care at any time.  Notes: Since last visit he had a prolonged hospital stay at [facility] with KATINA and there was discussion of dialysis. He was discharged home with a Hamilton catheter then developed UTI, hospitalized at [facility] for a couple of days. His only complaint to me is his shoulder hurting, legs still weak, appetite is  good. MMF is on hold.       Review of Systems   Constitutional:  Negative for activity change, appetite change, fatigue, fever and unexpected weight change.   HENT:  Negative for facial swelling.    Respiratory:  Negative for shortness of breath.    Cardiovascular:  Negative for chest pain and leg swelling.   Gastrointestinal:  Negative for abdominal pain.   Genitourinary:  Positive for frequency. Negative for decreased urine volume.   Musculoskeletal:  Positive for arthralgias and back pain.   Allergic/Immunologic: Positive for environmental allergies and immunocompromised state (kidney txp).   Neurological:  Positive for tremors.   Psychiatric/Behavioral:  Negative for confusion and decreased concentration.      Objective:      Physical Exam  Constitutional:       General: He is not in acute distress.     Appearance: He is not toxic-appearing.   Pulmonary:      Effort: Pulmonary effort is normal. No respiratory distress.   Neurological:      Mental Status: He is alert and oriented to person, place, and time.   Psychiatric:         Mood and Affect: Mood normal.       Assessment:       1. CKD (chronic kidney disease) stage 4, GFR 15-29 ml/min    2. Kidney replaced by transplant    3. Secondary renal hyperparathyroidism    4. Vitamin D deficiency    5. H/O kidney transplant    6. Immunosuppressive management encounter following kidney transplant    7. Primary hypertension    8. Anemia due to stage 4 chronic kidney disease    9. Prediabetes    10. Chronic diastolic heart failure    11. Paroxysmal atrial fibrillation    12. Former smoker    13. Hypertensive heart and renal disease with renal failure, stage 1 through stage 4 or unspecified chronic kidney disease, with heart failure            Plan:           KATINA X 2--first at [facility] and UTI due to indwelling Hamilton. KATINA has resolved    ESRD s/p kidney transplsant with resultant CKD. Stable allograft function with new baseline creatinine 2.5-2.9mg/dL. Continue to hold MMF  as immune system still recovering, check albumin and Hb 6/20 and if improved then resume MMF. Needs tac level as well     CKD stage 4 with subnephrotic proteinuria--stable, newer baseline creatinine 2.5-2.9mg/dL    HTN--controlled at home, monitor, on Lasix for diuretic BP control    Hyperglycemia post transplant, steroid induced/PreDiabetes--stable/improved    Mineral and Bone Disease--continue current treatment with calcium supplements, Zemplar and D3. Check D level with next labs    CHF.Diastolic Dysfunction--low sodium diet, BP and HR control. Cards follow up for afib           RTC 4 months with labs prior

## 2023-05-15 PROBLEM — J01.90 ACUTE SINUSITIS: Status: RESOLVED | Noted: 2023-02-09 | Resolved: 2023-05-15

## 2023-05-20 ENCOUNTER — OFFICE VISIT (OUTPATIENT)
Dept: URGENT CARE | Facility: CLINIC | Age: 68
End: 2023-05-20
Payer: MEDICARE

## 2023-05-20 VITALS
RESPIRATION RATE: 14 BRPM | OXYGEN SATURATION: 98 % | TEMPERATURE: 99 F | SYSTOLIC BLOOD PRESSURE: 159 MMHG | HEART RATE: 69 BPM | DIASTOLIC BLOOD PRESSURE: 67 MMHG | HEIGHT: 73 IN | WEIGHT: 213 LBS | BODY MASS INDEX: 28.23 KG/M2

## 2023-05-20 DIAGNOSIS — M70.31 BURSITIS OF RIGHT ELBOW, UNSPECIFIED BURSA: Primary | ICD-10-CM

## 2023-05-20 DIAGNOSIS — M25.521 RIGHT ELBOW PAIN: ICD-10-CM

## 2023-05-20 PROCEDURE — 99213 PR OFFICE/OUTPT VISIT, EST, LEVL III, 20-29 MIN: ICD-10-PCS | Mod: 25,S$GLB,, | Performed by: NURSE PRACTITIONER

## 2023-05-20 PROCEDURE — 96372 THER/PROPH/DIAG INJ SC/IM: CPT | Mod: S$GLB,,, | Performed by: NURSE PRACTITIONER

## 2023-05-20 PROCEDURE — 99213 OFFICE O/P EST LOW 20 MIN: CPT | Mod: 25,S$GLB,, | Performed by: NURSE PRACTITIONER

## 2023-05-20 PROCEDURE — 96372 PR INJECTION,THERAP/PROPH/DIAG2ST, IM OR SUBCUT: ICD-10-PCS | Mod: S$GLB,,, | Performed by: NURSE PRACTITIONER

## 2023-05-20 RX ORDER — DEXAMETHASONE SODIUM PHOSPHATE 100 MG/10ML
6 INJECTION INTRAMUSCULAR; INTRAVENOUS ONCE
Status: COMPLETED | OUTPATIENT
Start: 2023-05-20 | End: 2023-05-20

## 2023-05-20 RX ADMIN — DEXAMETHASONE SODIUM PHOSPHATE 6 MG: 100 INJECTION INTRAMUSCULAR; INTRAVENOUS at 11:05

## 2023-05-20 NOTE — PROGRESS NOTES
"Subjective:      Patient ID: Ibrahima Phelps is a 67 y.o. male.    Vitals:  height is 6' 1" (1.854 m) and weight is 96.6 kg (213 lb). His oral temperature is 98.5 °F (36.9 °C). His blood pressure is 159/67 (abnormal) and his pulse is 69. His respiration is 14 and oxygen saturation is 98%.     Chief Complaint: Joint Swelling    68 y/o male presents for R elbow pain and swelling onset 2 days ago. Admits to swelling in the digits of the R hand as well. Voices concerns for gout. Patient notes PMHx of gout.    Edema  This is a new problem. The current episode started in the past 7 days (2 days ago). The problem occurs constantly. The problem has been gradually worsening. Associated symptoms include arthralgias (R elbow) and joint swelling (R elbow and R hand). Pertinent negatives include no abdominal pain, anorexia, change in bowel habit, chest pain, chills, congestion, coughing, diaphoresis, fatigue, fever, headaches, myalgias, nausea, neck pain, numbness, rash, sore throat, swollen glands, urinary symptoms, vertigo, visual change, vomiting or weakness. Treatments tried: gabapentin, colchicine. The treatment provided no relief.     Constitution: Negative for chills, sweating, fatigue and fever.   HENT:  Negative for congestion and sore throat.    Neck: Negative for neck pain.   Cardiovascular:  Negative for chest pain and palpitations.   Respiratory:  Negative for cough.    Gastrointestinal:  Negative for abdominal pain, nausea and vomiting.   Musculoskeletal:  Positive for pain, joint pain (R elbow), joint swelling (R elbow and R hand), abnormal ROM of joint and gout. Negative for trauma, arthritis and muscle ache.   Skin:  Negative for rash.   Neurological:  Negative for history of vertigo, headaches and numbness.    Objective:     Physical Exam   Constitutional: He is oriented to person, place, and time.  Non-toxic appearance. He does not appear ill. No distress. normal  HENT:   Head: Normocephalic and atraumatic. "   Mouth/Throat: Mucous membranes are moist.   Eyes: Pupils are equal, round, and reactive to light.   Cardiovascular: Normal rate and regular rhythm.   Pulmonary/Chest: Effort normal and breath sounds normal.   Abdominal: Normal appearance.   Musculoskeletal:      Right elbow: He exhibits decreased range of motion and swelling. He exhibits no effusion, no deformity and no laceration. Tenderness found. Olecranon process tenderness noted.      Left elbow: Normal.        Arms:    Neurological: He is alert and oriented to person, place, and time.   Skin: Skin is warm and dry. Capillary refill takes less than 2 seconds.   Psychiatric: His behavior is normal.   Nursing note and vitals reviewed.    Assessment:     1. Bursitis of right elbow, unspecified bursa    2. Right elbow pain        Plan:       Bursitis of right elbow, unspecified bursa  -     dexAMETHasone injection 6 mg    Right elbow pain  -     dexAMETHasone injection 6 mg        Schedule an appointment with Orthopedics for follow up.  Avoid NSAIDs.  Tylenol OTC as needed for pain.  Read all inserts prior to administration.  Ace wrap for mild compression.  Elevate extremity for stability.  Diagnosis and plan discussed with the patient, as well as the expected course and duration of his symptoms.  Recommendation given for Orthopedic referral; patient is amenable. All questions and concerns were addressed prior to discharge.  He was advised to follow up with his PCP within 1 week if symptoms do not improve.  Emergency department precautions were given.  Patient verbalized understanding and was happy with the plan of care.  Note dictated with voice recognition software, please excuse any grammatical areas.                  Render Path Notes In Note?: No

## 2023-05-20 NOTE — PATIENT INSTRUCTIONS
Schedule an appointment with Orthopedics for follow up.  Avoid NSAIDs.  Tylenol OTC as needed for pain.  Read all inserts prior to administration.  Ace wrap for mild compression.  Elevate extremity for stability.  Diagnosis and plan discussed with the patient, as well as the expected course and duration of his symptoms.  Recommendation given for Orthopedic referral; patient is amenable. All questions and concerns were addressed prior to discharge.  He was advised to follow up with his PCP within 1 week if symptoms do not improve.  Emergency department precautions were given.  Patient verbalized understanding and was happy with the plan of care.  Note dictated with voice recognition software, please excuse any grammatical areas.         You must understand that you've received an Urgent Care treatment only and that you may be released before all your medical problems are known or treated. You, the patient, will arrange for follow up care as instructed.  Follow up with your PCP or specialty clinic as directed in the next 1-2 weeks if not improved or as needed.  You can call [phone] to schedule an appointment with the appropriate provider.  If your condition worsens we recommend that you receive another evaluation at the emergency room immediately or contact your primary medical clinics after hours call service to discuss your concerns.  Please return here or go to the Emergency Department for any concerns or worsening of condition.    If you were prescribed a narcotic or controlled medication, do not drive or operate heavy equipment or machinery while taking these medications.    Thank you for choosing [facility]!    Our goal in the Urgent Care is to always provide outstanding medical care. You may receive a survey by mail or e-mail in the next week regarding your experience today. We would greatly appreciate you completing and returning the survey. Your feedback provides us with a way to recognize our  staff who provide very good care, and it helps us learn how to improve when your experience was below our aspiration of excellence.      We appreciate you trusting us with your medical care. We hope you feel better soon. We will be happy to take care of you for all of your future medical needs.    Sincerely,    Raudel Heredia DNP, FNP -C

## 2023-05-26 ENCOUNTER — PATIENT MESSAGE (OUTPATIENT)
Dept: NEPHROLOGY | Facility: CLINIC | Age: 68
End: 2023-05-26
Payer: MEDICARE

## 2023-05-26 ENCOUNTER — TELEPHONE (OUTPATIENT)
Dept: NEPHROLOGY | Facility: CLINIC | Age: 68
End: 2023-05-26
Payer: MEDICARE

## 2023-05-26 NOTE — TELEPHONE ENCOUNTER
----- Message from Emre Latif MD sent at 5/25/2023  7:02 PM CDT -----  Labs 6/20--Prograf, CBC and renal panel   RTC 4mo w labs   The patient was admitted to the hospital with a diagnosis of acute appendicitis.  The patient underwent a laparoscopic appendectomy without incident and was discharged home after recovering from surgery.

## 2023-06-05 PROBLEM — N18.9 ACUTE KIDNEY INJURY SUPERIMPOSED ON CKD: Status: RESOLVED | Noted: 2023-02-20 | Resolved: 2023-06-05

## 2023-06-05 PROBLEM — N17.9 ACUTE KIDNEY INJURY SUPERIMPOSED ON CKD: Status: RESOLVED | Noted: 2023-02-20 | Resolved: 2023-06-05

## 2023-06-05 RX ORDER — ATORVASTATIN CALCIUM 10 MG/1
TABLET, FILM COATED ORAL
Qty: 90 TABLET | Refills: 1 | Status: ON HOLD | OUTPATIENT
Start: 2023-06-05 | End: 2023-10-20 | Stop reason: HOSPADM

## 2023-06-07 ENCOUNTER — HOSPITAL ENCOUNTER (EMERGENCY)
Facility: HOSPITAL | Age: 68
Discharge: HOME | End: 2023-06-07
Attending: EMERGENCY MEDICINE
Payer: MEDICARE

## 2023-06-07 VITALS
HEART RATE: 90 BPM | SYSTOLIC BLOOD PRESSURE: 145 MMHG | WEIGHT: 230 LBS | BODY MASS INDEX: 31.15 KG/M2 | HEIGHT: 72 IN | RESPIRATION RATE: 16 BRPM | DIASTOLIC BLOOD PRESSURE: 78 MMHG | TEMPERATURE: 98 F | OXYGEN SATURATION: 98 %

## 2023-06-07 DIAGNOSIS — L03.119 CELLULITIS OF FOOT: Primary | ICD-10-CM

## 2023-06-07 PROCEDURE — 99284 EMERGENCY DEPT VISIT MOD MDM: CPT

## 2023-06-07 PROCEDURE — 99284 PR EMERGENCY DEPT VISIT,LEVEL IV: ICD-10-PCS | Mod: ,,, | Performed by: EMERGENCY MEDICINE

## 2023-06-07 PROCEDURE — 99284 EMERGENCY DEPT VISIT MOD MDM: CPT | Mod: ,,, | Performed by: EMERGENCY MEDICINE

## 2023-06-07 PROCEDURE — 25000003 PHARM REV CODE 250: Performed by: PHYSICIAN ASSISTANT

## 2023-06-07 RX ORDER — HYDROCODONE BITARTRATE AND ACETAMINOPHEN 10; 325 MG/1; MG/1
1 TABLET ORAL EVERY 6 HOURS PRN
Qty: 9 TABLET | Refills: 0 | Status: SHIPPED | OUTPATIENT
Start: 2023-06-07 | End: 2023-06-20

## 2023-06-07 RX ORDER — HYDROCODONE BITARTRATE AND ACETAMINOPHEN 10; 325 MG/1; MG/1
1 TABLET ORAL
Status: COMPLETED | OUTPATIENT
Start: 2023-06-07 | End: 2023-06-07

## 2023-06-07 RX ORDER — CEPHALEXIN 500 MG/1
500 CAPSULE ORAL 4 TIMES DAILY
Qty: 40 CAPSULE | Refills: 0 | Status: SHIPPED | OUTPATIENT
Start: 2023-06-07 | End: 2023-06-17

## 2023-06-07 RX ADMIN — HYDROCODONE BITARTRATE AND ACETAMINOPHEN 1 TABLET: 10; 325 TABLET ORAL at 03:06

## 2023-06-07 NOTE — DISCHARGE INSTRUCTIONS
Please  your prescription from the pharmacy.  Watch for worsening signs of infection including worsening swelling, redness, drainage, fever or chills.  Please follow-up with primary doctor next 2-3 days.  Ice, elevate and rest.  Return the ER if symptoms worsen

## 2023-06-07 NOTE — ED PROVIDER NOTES
"Encounter Date: 6/7/2023       History     Chief Complaint   Patient presents with    Foot Pain     Pt states "something bit me on my left foot and its swollen"     66 yo M with pmhx Kidney transplant x2 (1992 & 2005), CKD, CAD, pAfib on eliquis, CHF presents ER for evaluation of left foot pain.  Patient reports that about 2 days ago he noticed some redness and swelling to the left inner foot.  He thinks that he may have been bit by an insect.  He has noticed a circular redness with a central puncture wound.  Denies any drainage from the site.  No fever chills.  Reports pain and sensitivity when wearing shoes.  Patient is up-to-date on tetanus vaccination.  No recent antibiotics at this time.  Has not taken any medicine for pain.  He has been placing ice pack.    The history is provided by the patient.   Review of patient's allergies indicates:   Allergen Reactions    Ace inhibitors Swelling    Verapamil Other (See Comments)     Other reaction(s): Unknown    Povidone-iodine Itching     Past Medical History:   Diagnosis Date    Atrial fibrillation     CAD (coronary artery disease) 2006    MI in 2006    CHF (congestive heart failure) 2017    CKD (chronic kidney disease) stage 3, GFR 30-59 ml/min     Dr. Latif.  in transplanted kidney    COVID-19 2/7/2023    Diverticulosis     Hyperlipidemia     Hypertension     Keloid cicatrix     Metabolic bone disease     Other emphysema 10/1/2019    Pericarditis     S/P kidney transplant 1992    x2 (1992 & 2005),    Thrombocytopenia     Thyroid disease     Tobacco use 09/05/2014     Past Surgical History:   Procedure Laterality Date    CARDIOVERSION  04/30/15    CHOLECYSTECTOMY      COLONOSCOPY  April 20, 2011    Diverticulosis, repeat recommended in 3 yrs., repeat colonoscopy 2014 revealed 2 polyps.  He should return in 5 years.    COLONOSCOPY N/A 3/13/2020    Procedure: COLONOSCOPY;  Surgeon: Chon Casper MD;  Location: [facility] ([address] [place] [address]);  Service: Endoscopy;  " "Laterality: N/A;  ok to hold coumadin x5days-see telephone encounter 2/4/20-tb    COLONOSCOPY N/A 2/4/2021    Procedure: COLONOSCOPY;  Surgeon: Chon Casper MD;  Location: [facility] ([address] [place] [address]);  Service: Endoscopy;  Laterality: N/A;  Eliquis - per Dr. GAVIN Blunt ok to hold x 2 days and "restarted asap"- ERW  last prep "poor", [id number] ordered/ Pt requests Dr. Casper  prep ins. mailed - COVID screening on 2/1/21 PCW- ERW    COLONOSCOPY N/A 3/3/2021    Procedure: COLONOSCOPY;  Surgeon: Chon Casper MD;  Location: [facility] ([address] [place] [address]);  Service: Endoscopy;  Laterality: N/A;  Patient with his second poor bowel pre and has poor prep today.  If patient not intersted or can't get colonoscopy tomorrow will need constipation bowel prep and will need to restart his Eliquis today.Thanks,Chon     per Dr Blunt-ok to hold Eliquis 2 days prior      COVID     CORONARY ANGIOPLASTY WITH STENT PLACEMENT  9/13/2006    KIDNEY TRANSPLANT  2005    PARATHYROIDECTOMY      TREATMENT OF CARDIAC ARRHYTHMIA N/A 3/28/2022    Procedure: CARDIOVERSION;  Surgeon: Migue Blunt MD;  Location: [facility];  Service: Cardiology;  Laterality: N/A;  AF, DCC, MAC, GP, 3 PREP*RODRIGO deferred pt 100% compliant*     Family History   Problem Relation Age of Onset    No Known Problems Sister     No Known Problems Brother     Thyroid disease Mother         s/p surgery    Heart disease Father         had pacemaker    No Known Problems Sister     Kidney failure Sister     Kidney disease Sister     No Known Problems Sister     Kidney disease Brother     Kidney failure Brother         s/p transplant    Diabetes Mellitus Neg Hx     Stroke Neg Hx     Heart attack Neg Hx     Cancer Neg Hx     Celiac disease Neg Hx     Cirrhosis Neg Hx     Colon cancer Neg Hx     Esophageal cancer Neg Hx     Inflammatory bowel disease Neg Hx     Rectal cancer Neg Hx     Stomach cancer Neg Hx     Ulcerative colitis Neg Hx     Liver disease Neg Hx     Liver cancer Neg Hx     " Crohn's disease Neg Hx     Melanoma Neg Hx      Social History     Tobacco Use    Smoking status: Former     Packs/day: 0.50     Years: 40.00     Pack years: 20.00     Types: Cigarettes     Quit date: 2022     Years since quittin.2    Smokeless tobacco: Never    Tobacco comments:     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     Patient is currently retired   Substance Use Topics    Alcohol use: No    Drug use: No     Review of Systems   Constitutional:  Negative for chills and fever.   Eyes:  Negative for visual disturbance.   Respiratory:  Negative for shortness of breath.    Cardiovascular:  Negative for chest pain.   Gastrointestinal:  Negative for nausea and vomiting.   Genitourinary:  Negative for dysuria and flank pain.   Musculoskeletal:  Negative for myalgias.   Skin:  Positive for color change. Negative for rash.   Allergic/Immunologic: Negative for immunocompromised state.   Neurological:  Negative for weakness and numbness.   Hematological:  Does not bruise/bleed easily.   Psychiatric/Behavioral:  Negative for confusion.      Physical Exam     Initial Vitals [23 1420]   BP Pulse Resp Temp SpO2   139/69 108 16 98.7 °F (37.1 °C) 99 %      MAP       --         Physical Exam    Vitals reviewed.  Constitutional: He appears well-developed and well-nourished. He is not diaphoretic. No distress.   HENT:   Head: Normocephalic and atraumatic.   Eyes: Conjunctivae and EOM are normal.   Neck: Neck supple.   Pulmonary/Chest: No respiratory distress.   Musculoskeletal:         General: Normal range of motion.      Cervical back: Neck supple.     Neurological: He is alert and oriented to person, place, and time.   Skin: Skin is warm. There is erythema (3 cm area of erythema to the left medial foot with associated tenderness.).   No fluctuance, necrosis or abscess       ED Course   Procedures  Labs Reviewed - No data to display       Imaging Results    None           Medications   HYDROcodone-acetaminophen  mg per tablet 1 tablet (1 tablet Oral Given 6/7/23 4268)           APC / Resident Notes:   Patient seen in the ER promptly upon arrival.  He is afebrile, no acute distress.  Physical examination reveals a 3 cm area of erythema to the left medial foot.  Distal pulses intact and equal bilaterally.  There is associated swelling and tenderness on palpation.  No obvious abscess, fluctuance or necrosis noted.  Findings concerning for possible developing cellulitis versus localized reaction likely secondary to an insect bite.      Given that patient is immunocompromised will cover with antibiotics.  Will also prescribed home on Norco for pain control.  Will advised ice, elevate and rest.  Patient is to follow up with his primary doctor in the next 2-3 days for close wound check.  Patient given strict precautions ED including but not limited to worsening pain, development of fever, worsening swelling or redness.  Patient and wife were agreeable to this.  He is otherwise safe for discharge and close follow-up    Disclaimer: This note has been generated using voice-recognition software. There may be typographical errors that have been missed during proof-reading.                       Clinical Impression:   Final diagnoses:  [L03.119] Cellulitis of foot (Primary)        ED Disposition Condition    Discharge Stable          ED Prescriptions       Medication Sig Dispense Start Date End Date Auth. Provider    cephALEXin (KEFLEX) 500 MG capsule Take 1 capsule (500 mg total) by mouth 4 (four) times daily. for 10 days 40 capsule 6/7/2023 6/17/2023 Ruth Yeh PA-C    HYDROcodone-acetaminophen (NORCO)  mg per tablet Take 1 tablet by mouth every 6 (six) hours as needed for Pain. 9 tablet 6/7/2023 -- Ruth Yeh PA-C          Follow-up Information       Follow up With Specialties Details Why Contact Info    Connie Magdaleno MD Internal Medicine   [address]  [facility] [address]  [phone]               Ruth Yeh PA-C  06/07/23 1549

## 2023-06-07 NOTE — ED NOTES
LOC: The patient is awake, alert, and oriented to self, place, time, and situation. Pt is calm and cooperative. Affect is appropriate.  Speech is appropriate and clear.     APPEARANCE: Patient resting uncomfortably, left foot with swelling and redness, in no acute distress.  Patient is clean and well groomed.    SKIN: The skin is warm and dry; color consistent with ethnicity.  Patient has normal skin turgor and moist mucus membranes.  Skin intact; no breakdown or bruising noted.     MUSCULOSKELETAL: Pt left foot with swelling and redness. Pt reporting pain.  Denies weakness.     RESPIRATORY: Airway is open and patent. Respirations spontaneous, even, easy, and non-labored.  Patient has a normal effort and rate.  No accessory muscle use noted. Denies cough.     CARDIAC: .  No peripheral edema noted. No complaints of chest pain.     ABDOMEN: Soft and non tender to palpation.  No distention noted. Pt denies abdominal pain; denies nausea, vomiting, diarrhea, or constipation.    NEUROLOGIC: Eyes open spontaneously.  Behavior appropriate to situation.  Follows commands; facial expression symmetrical.  Purposeful motor response noted; normal sensation in all extremities. Pt denies headache; denies lightheadedness or dizziness; denies visual disturbances; denies loss of balance; denies unilateral weakness.

## 2023-06-08 ENCOUNTER — PATIENT OUTREACH (OUTPATIENT)
Dept: EMERGENCY MEDICINE | Facility: HOSPITAL | Age: 68
End: 2023-06-08
Payer: MEDICARE

## 2023-06-12 ENCOUNTER — PATIENT OUTREACH (OUTPATIENT)
Dept: EMERGENCY MEDICINE | Facility: HOSPITAL | Age: 68
End: 2023-06-12
Payer: MEDICARE

## 2023-06-13 ENCOUNTER — OFFICE VISIT (OUTPATIENT)
Dept: INTERNAL MEDICINE | Facility: CLINIC | Age: 68
End: 2023-06-13
Payer: MEDICARE

## 2023-06-13 ENCOUNTER — HOSPITAL ENCOUNTER (OUTPATIENT)
Dept: RADIOLOGY | Facility: HOSPITAL | Age: 68
Discharge: HOME | End: 2023-06-13
Attending: STUDENT IN AN ORGANIZED HEALTH CARE EDUCATION/TRAINING PROGRAM
Payer: MEDICARE

## 2023-06-13 ENCOUNTER — TELEPHONE (OUTPATIENT)
Dept: INTERNAL MEDICINE | Facility: CLINIC | Age: 68
End: 2023-06-13
Payer: MEDICARE

## 2023-06-13 VITALS
TEMPERATURE: 98 F | HEART RATE: 56 BPM | OXYGEN SATURATION: 98 % | DIASTOLIC BLOOD PRESSURE: 70 MMHG | SYSTOLIC BLOOD PRESSURE: 139 MMHG | HEIGHT: 72 IN | WEIGHT: 207.88 LBS | BODY MASS INDEX: 28.16 KG/M2

## 2023-06-13 DIAGNOSIS — Z79.899 IMMUNODEFICIENCY DUE TO TREATMENT WITH IMMUNOSUPPRESSIVE MEDICATION: ICD-10-CM

## 2023-06-13 DIAGNOSIS — I48.0 PAROXYSMAL ATRIAL FIBRILLATION: ICD-10-CM

## 2023-06-13 DIAGNOSIS — G25.2 POSTURAL TREMOR: ICD-10-CM

## 2023-06-13 DIAGNOSIS — R55 SYNCOPE AND COLLAPSE: ICD-10-CM

## 2023-06-13 DIAGNOSIS — Z76.89 ESTABLISHING CARE WITH NEW DOCTOR, ENCOUNTER FOR: Primary | ICD-10-CM

## 2023-06-13 DIAGNOSIS — I10 PRIMARY HYPERTENSION: ICD-10-CM

## 2023-06-13 DIAGNOSIS — I25.10 CORONARY ARTERY DISEASE INVOLVING NATIVE CORONARY ARTERY OF NATIVE HEART WITHOUT ANGINA PECTORIS: ICD-10-CM

## 2023-06-13 DIAGNOSIS — Z94.0 KIDNEY REPLACED BY TRANSPLANT: ICD-10-CM

## 2023-06-13 DIAGNOSIS — M79.672 LEFT FOOT PAIN: ICD-10-CM

## 2023-06-13 DIAGNOSIS — D84.821 IMMUNODEFICIENCY DUE TO TREATMENT WITH IMMUNOSUPPRESSIVE MEDICATION: ICD-10-CM

## 2023-06-13 DIAGNOSIS — I48.0 PAROXYSMAL ATRIAL FIBRILLATION: Primary | ICD-10-CM

## 2023-06-13 DIAGNOSIS — R91.8 MULTIPLE LUNG NODULES ON CT: ICD-10-CM

## 2023-06-13 DIAGNOSIS — R73.03 PREDIABETES: ICD-10-CM

## 2023-06-13 DIAGNOSIS — Z79.01 LONG TERM (CURRENT) USE OF ANTICOAGULANTS: ICD-10-CM

## 2023-06-13 PROCEDURE — 99999 PR PBB SHADOW E&M-EST. PATIENT-LVL V: CPT | Mod: PBBFAC,,, | Performed by: STUDENT IN AN ORGANIZED HEALTH CARE EDUCATION/TRAINING PROGRAM

## 2023-06-13 PROCEDURE — 3066F NEPHROPATHY DOC TX: CPT | Mod: CPTII,S$GLB,, | Performed by: STUDENT IN AN ORGANIZED HEALTH CARE EDUCATION/TRAINING PROGRAM

## 2023-06-13 PROCEDURE — 99999 PR PBB SHADOW E&M-EST. PATIENT-LVL V: ICD-10-PCS | Mod: PBBFAC,,, | Performed by: STUDENT IN AN ORGANIZED HEALTH CARE EDUCATION/TRAINING PROGRAM

## 2023-06-13 PROCEDURE — 1126F PR PAIN SEVERITY QUANTIFIED, NO PAIN PRESENT: ICD-10-PCS | Mod: CPTII,S$GLB,, | Performed by: STUDENT IN AN ORGANIZED HEALTH CARE EDUCATION/TRAINING PROGRAM

## 2023-06-13 PROCEDURE — 73630 X-RAY EXAM OF FOOT: CPT | Mod: TC,LT

## 2023-06-13 PROCEDURE — 1126F AMNT PAIN NOTED NONE PRSNT: CPT | Mod: CPTII,S$GLB,, | Performed by: STUDENT IN AN ORGANIZED HEALTH CARE EDUCATION/TRAINING PROGRAM

## 2023-06-13 PROCEDURE — 3075F SYST BP GE 130 - 139MM HG: CPT | Mod: CPTII,S$GLB,, | Performed by: STUDENT IN AN ORGANIZED HEALTH CARE EDUCATION/TRAINING PROGRAM

## 2023-06-13 PROCEDURE — 3044F HG A1C LEVEL LT 7.0%: CPT | Mod: CPTII,S$GLB,, | Performed by: STUDENT IN AN ORGANIZED HEALTH CARE EDUCATION/TRAINING PROGRAM

## 2023-06-13 PROCEDURE — 3008F PR BODY MASS INDEX (BMI) DOCUMENTED: ICD-10-PCS | Mod: CPTII,S$GLB,, | Performed by: STUDENT IN AN ORGANIZED HEALTH CARE EDUCATION/TRAINING PROGRAM

## 2023-06-13 PROCEDURE — 99214 PR OFFICE/OUTPT VISIT, EST, LEVL IV, 30-39 MIN: ICD-10-PCS | Mod: S$GLB,,, | Performed by: STUDENT IN AN ORGANIZED HEALTH CARE EDUCATION/TRAINING PROGRAM

## 2023-06-13 PROCEDURE — 1160F RVW MEDS BY RX/DR IN RCRD: CPT | Mod: CPTII,S$GLB,, | Performed by: STUDENT IN AN ORGANIZED HEALTH CARE EDUCATION/TRAINING PROGRAM

## 2023-06-13 PROCEDURE — 3062F PR POS MACROALBUMINURIA RESULT DOCUMENTED/REVIEW: ICD-10-PCS | Mod: CPTII,S$GLB,, | Performed by: STUDENT IN AN ORGANIZED HEALTH CARE EDUCATION/TRAINING PROGRAM

## 2023-06-13 PROCEDURE — 1159F MED LIST DOCD IN RCRD: CPT | Mod: CPTII,S$GLB,, | Performed by: STUDENT IN AN ORGANIZED HEALTH CARE EDUCATION/TRAINING PROGRAM

## 2023-06-13 PROCEDURE — 3044F PR MOST RECENT HEMOGLOBIN A1C LEVEL <7.0%: ICD-10-PCS | Mod: CPTII,S$GLB,, | Performed by: STUDENT IN AN ORGANIZED HEALTH CARE EDUCATION/TRAINING PROGRAM

## 2023-06-13 PROCEDURE — 3066F PR DOCUMENTATION OF TREATMENT FOR NEPHROPATHY: ICD-10-PCS | Mod: CPTII,S$GLB,, | Performed by: STUDENT IN AN ORGANIZED HEALTH CARE EDUCATION/TRAINING PROGRAM

## 2023-06-13 PROCEDURE — 3078F DIAST BP <80 MM HG: CPT | Mod: CPTII,S$GLB,, | Performed by: STUDENT IN AN ORGANIZED HEALTH CARE EDUCATION/TRAINING PROGRAM

## 2023-06-13 PROCEDURE — 73630 XR FOOT COMPLETE 3 VIEW LEFT: ICD-10-PCS | Mod: 26,LT,, | Performed by: RADIOLOGY

## 2023-06-13 PROCEDURE — 3008F BODY MASS INDEX DOCD: CPT | Mod: CPTII,S$GLB,, | Performed by: STUDENT IN AN ORGANIZED HEALTH CARE EDUCATION/TRAINING PROGRAM

## 2023-06-13 PROCEDURE — 3062F POS MACROALBUMINURIA REV: CPT | Mod: CPTII,S$GLB,, | Performed by: STUDENT IN AN ORGANIZED HEALTH CARE EDUCATION/TRAINING PROGRAM

## 2023-06-13 PROCEDURE — 1159F PR MEDICATION LIST DOCUMENTED IN MEDICAL RECORD: ICD-10-PCS | Mod: CPTII,S$GLB,, | Performed by: STUDENT IN AN ORGANIZED HEALTH CARE EDUCATION/TRAINING PROGRAM

## 2023-06-13 PROCEDURE — 99214 OFFICE O/P EST MOD 30 MIN: CPT | Mod: S$GLB,,, | Performed by: STUDENT IN AN ORGANIZED HEALTH CARE EDUCATION/TRAINING PROGRAM

## 2023-06-13 PROCEDURE — 73630 X-RAY EXAM OF FOOT: CPT | Mod: 26,LT,, | Performed by: RADIOLOGY

## 2023-06-13 PROCEDURE — 3075F PR MOST RECENT SYSTOLIC BLOOD PRESS GE 130-139MM HG: ICD-10-PCS | Mod: CPTII,S$GLB,, | Performed by: STUDENT IN AN ORGANIZED HEALTH CARE EDUCATION/TRAINING PROGRAM

## 2023-06-13 PROCEDURE — 3078F PR MOST RECENT DIASTOLIC BLOOD PRESSURE < 80 MM HG: ICD-10-PCS | Mod: CPTII,S$GLB,, | Performed by: STUDENT IN AN ORGANIZED HEALTH CARE EDUCATION/TRAINING PROGRAM

## 2023-06-13 PROCEDURE — 1160F PR REVIEW ALL MEDS BY PRESCRIBER/CLIN PHARMACIST DOCUMENTED: ICD-10-PCS | Mod: CPTII,S$GLB,, | Performed by: STUDENT IN AN ORGANIZED HEALTH CARE EDUCATION/TRAINING PROGRAM

## 2023-06-13 NOTE — PATIENT INSTRUCTIONS
Over the Counter Options for Pain    Capsaicin Cream: Apply to skin over affected area 3 to 4 times a day as needed. Do not apply to open wounds or irritated skin. Wash your hands after application to avoid getting it in eyes.     Lidocaine (Salonpas) Patch: Apply over affected are and leave in place for 8 to 12 hours as needed.     Acetaminophen (Tylenol): Take 1,000 mg by mouth every 4 to 6 hours as needed. Do not take more than 3,000 mg in a day.     Ice Packs:  - Fill a bag with crushed ice about half full. Remove the air from the bag before you close it. You can also use a bag of frozen vegetables.    - Wrap the ice pack in a cloth to protect your skin from frostbite or other injury.    - Put the ice over the injured area for 20 to 30 minutes or as long as directed.  Check your skin after about 30 seconds for color changes or blistering. Remove the ice if you notice skin changes or you feel burning or numbness in the area.    - Throw the ice pack away after use.    - Apply ice to your injured area 4 times each day or as directed. Ask your healthcare provider how many days you should apply ice.    Heating Pad:  Apply to affected area for NO LONGER than 15 minutes. Use a layer of towels between your skin and the heating pad. Remove for at least 1 hour then repeat. 2-3 applications a day is advisable.

## 2023-06-13 NOTE — PROGRESS NOTES
"SUBJECTIVE     Chief Complaint   Patient presents with    Establish Care       HPI  Ibrahima Phelps is a 67 y.o. male with   HTN c CKD3 and hx renal transplant x2 in 2002 and 2005 and on immunosuppressive meds and secondary hyperPTH, HLD, A fib, hx SVT, CAD c chronic HFpEF, COPD c baseline DAUGHERTY and tobacco use, thrombocytopenia now resolved, anemia of chronic disease, preDM, GERD, lung nodule  that presents for establishment of care.     This is a new patient to me but established to [facility]. Previously saw Dr. Magdaleno for primary care.     Seen in ED at [facility] for cellulitis of the left foot. Given Keflex and Norco 10 mg q6hrs PRN pain. Symptoms improved. No fevers, chills, nausea, vomiting.     Reports intermittently passing out. Will feel "real tired" and will collapse. Will have LOC x 15 minutes. Had noted some shaking in head. Has appt scheduled with Neurology.     Patient's past medical and surgical history, social history, family history, allergies, and medications reviewed in detail by this physician.       PAST MEDICAL HISTORY:  Past Medical History:   Diagnosis Date    Atrial fibrillation     CAD (coronary artery disease) 2006    MI in 2006    CHF (congestive heart failure) 2017    CKD (chronic kidney disease) stage 3, GFR 30-59 ml/min     Dr. Latif.  in transplanted kidney    COVID-19 2/7/2023    Diverticulosis     Hyperlipidemia     Hypertension     Keloid cicatrix     Metabolic bone disease     Other emphysema 10/1/2019    Pericarditis     S/P kidney transplant 1992    x2 (1992 & 2005),    Thrombocytopenia     Thyroid disease     Tobacco use 09/05/2014       PAST SURGICAL HISTORY:  Past Surgical History:   Procedure Laterality Date    CARDIOVERSION  04/30/15    CHOLECYSTECTOMY      COLONOSCOPY  April 20, 2011    Diverticulosis, repeat recommended in 3 yrs., repeat colonoscopy 2014 revealed 2 polyps.  He should return in 5 years.    COLONOSCOPY N/A 3/13/2020    Procedure: COLONOSCOPY;  Surgeon: Chon SPENCER" "MD Pennie;  [facility]  Service: Endoscopy;  Laterality: N/A;  ok to hold coumadin x5days-see telephone encounter 2/4/20-tb    COLONOSCOPY N/A 2/4/2021    Procedure: COLONOSCOPY;  Surgeon: Chon Casper MD;  Location: [facility] (4TH FLR);  Service: Endoscopy;  Laterality: N/A;  Eliquis - per Dr. GAVIN Blunt ok to hold x 2 days and "restarted asap"- ERW  last prep "poor", [id number] ordered/ Pt requests Dr. Casper  prep ins. mailed - COVID screening on 2/1/21 PCW- ERW    COLONOSCOPY N/A 3/3/2021    Procedure: COLONOSCOPY;  Surgeon: Chon Casper MD;  Location: [facility]  Service: Endoscopy;  Laterality: N/A;  Patient with his second poor bowel pre and has poor prep today.  If patient not intersted or can't get colonoscopy tomorrow will need constipation bowel prep and will need to restart his Eliquis today.Thanks,Chon     per Dr Blunt-ok to hold Eliquis 2 days prior      COVID     CORONARY ANGIOPLASTY WITH STENT PLACEMENT  9/13/2006    KIDNEY TRANSPLANT  2005    PARATHYROIDECTOMY      TREATMENT OF CARDIAC ARRHYTHMIA N/A 3/28/2022    Procedure: CARDIOVERSION;  Surgeon: Migue lBunt MD;  Location: [facility];  Service: Cardiology;  Laterality: N/A;  AF, DCC, MAC, GP, 3 PREP*RODRIGO deferred pt 100% compliant*       FAMILY HISTORY:  Family History   Problem Relation Age of Onset    No Known Problems Sister     No Known Problems Brother     Thyroid disease Mother         s/p surgery    Heart disease Father         had pacemaker    No Known Problems Sister     Kidney failure Sister     Kidney disease Sister     No Known Problems Sister     Kidney disease Brother     Kidney failure Brother         s/p transplant    Diabetes Mellitus Neg Hx     Stroke Neg Hx     Heart attack Neg Hx     Cancer Neg Hx     Celiac disease Neg Hx     Cirrhosis Neg Hx     Colon cancer Neg Hx     Esophageal cancer Neg Hx     Inflammatory bowel disease Neg Hx     Rectal cancer Neg Hx     Stomach cancer Neg Hx  "    Ulcerative colitis Neg Hx     Liver disease Neg Hx     Liver cancer Neg Hx     Crohn's disease Neg Hx     Melanoma Neg Hx        ALLERGIES AND MEDICATIONS: updated and reviewed.  Review of patient's allergies indicates:   Allergen Reactions    Ace inhibitors Swelling    Verapamil Other (See Comments)     Other reaction(s): Unknown    Povidone-iodine Itching     Current Outpatient Medications   Medication Sig Dispense Refill    acetaminophen (TYLENOL) 500 MG tablet Take 1 tablet (500 mg total) by mouth every 6 (six) hours as needed for Pain. 20 tablet 0    albuterol (PROVENTIL) 2.5 mg /3 mL (0.083 %) nebulizer solution Take 3 mLs (2.5 mg total) by nebulization every 6 (six) hours as needed for Wheezing. Rescue 9 mL 6    albuterol (VENTOLIN HFA) 90 mcg/actuation inhaler Inhale 2 puffs into the lungs every 6 (six) hours as needed for Wheezing or Shortness of Breath. Rescue 18 g 3    ammonium lactate 12 % Crea Apply 1 g topically once daily. 140 g 1    aspirin (ECOTRIN) 81 MG EC tablet Take 1 tablet by mouth Daily.      atorvastatin (LIPITOR) 10 MG tablet TAKE 1 TABLET BY MOUTH EVERY DAY 90 tablet 1    calcium carbonate (CALCIUM 600 ORAL) Take by mouth. Pt taking 1200 daily      cephALEXin (KEFLEX) 500 MG capsule Take 1 capsule (500 mg total) by mouth 4 (four) times daily. for 10 days 40 capsule 0    colchicine (MITIGARE) 0.6 mg Cap One po BID prn gout flare up to 3 days 15 capsule 11    doxazosin (CARDURA) 4 MG tablet TAKE 1 TABLET EVERY 12 HOURS 180 tablet 3    ELIQUIS 5 mg Tab TAKE 1 TABLET BY MOUTH TWICE A  tablet 3    famotidine (PEPCID) 20 MG tablet TAKE 1 TABLET TWICE DAILY 180 tablet 3    fluticasone propion-salmeterol 115-21 mcg/dose (ADVAIR HFA) 115-21 mcg/actuation HFAA inhaler Inhale 2 puffs into the lungs every 12 (twelve) hours. Controller 12 g 5    fluticasone propionate (FLONASE) 50 mcg/actuation nasal spray 1 spray (50 mcg total) by Each Nostril route once daily. 18.2 mL 0    furosemide  (LASIX) 80 MG tablet Take 1 tablet (80 mg total) by mouth 2 (two) times daily. 60 tablet 0    gabapentin (NEURONTIN) 100 MG capsule Take 1 capsule (100 mg total) by mouth every evening. 30 capsule 0    hydrALAZINE (APRESOLINE) 50 MG tablet TAKE 1 TABLET (50 MG TOTAL) BY MOUTH 3 (THREE) TIMES DAILY. 270 tablet 3    HYDROcodone-acetaminophen (NORCO)  mg per tablet Take 1 tablet by mouth every 6 (six) hours as needed for Pain. 9 tablet 0    LIDOcaine (LIDODERM) 5 % Place 1 patch onto the skin daily as needed (back pain). Remove & Discard patch within 12 hours or as directed by MD 15 patch 0    metoprolol succinate (TOPROL-XL) 25 MG 24 hr tablet Take 1 tablet (25 mg total) by mouth once daily. 90 tablet 3    morphine 2 mg/mL Syrg       multivitamin (THERAGRAN) per tablet Take 1 tablet by mouth Daily.      NIFEdipine (PROCARDIA-XL) 60 MG (OSM) 24 hr tablet Take 1 tablet (60 mg total) by mouth 2 (two) times a day. 14 tablet 0    omeprazole (PRILOSEC) 20 MG capsule Take 1 capsule (20 mg total) by mouth once daily. 14 capsule 0    paricalcitoL (ZEMPLAR) 1 MCG capsule TAKE 1 CAPSULE ON MONDAY, WEDNESDAY AND FRIDAY 36 capsule 3    predniSONE (DELTASONE) 5 MG tablet TAKE 1 TABLET BY MOUTH EVERY DAY IN THE MORNING 90 tablet 3    pulse oximeter (PULSE OXIMETER) device by Apply Externally route 2 (two) times a day. Use twice daily at 8 AM and 3 PM and record the value in [facility] as directed. 1 each 0    tacrolimus (PROGRAF) 1 MG Cap Take 2 capsules (2 mg total) by mouth every morning AND 2 capsules (2 mg total) every evening. 270 capsule 3    vitamin D 1000 units Tab Take 370 mg by mouth 2 (two) times daily. 2 tablets BID       No current facility-administered medications for this visit.       ROS  Review of Systems   Constitutional:  Negative for activity change, chills and fever.   HENT:  Negative for congestion and hearing loss.    Eyes:  Negative for pain and visual disturbance.   Respiratory:  Negative for cough and  shortness of breath.    Cardiovascular:  Negative for chest pain and palpitations.   Gastrointestinal:  Negative for abdominal pain, constipation, diarrhea, nausea and vomiting.   Endocrine: Negative.    Genitourinary: Negative.    Musculoskeletal:  Positive for arthralgias. Negative for myalgias.   Skin: Negative.    Allergic/Immunologic: Negative.    Neurological:  Positive for syncope and weakness. Negative for dizziness, light-headedness and headaches.   Hematological: Negative.        OBJECTIVE     Physical Exam  Vitals:    06/13/23 0846   BP: 139/70   Pulse: (!) 56   Temp: 97.9 °F (36.6 °C)    Body mass index is 28.2 kg/m².            Physical Exam  Vitals reviewed.   Constitutional:       General: He is not in acute distress.     Appearance: Normal appearance.   HENT:      Head: Normocephalic and atraumatic.      Mouth/Throat:      Mouth: Mucous membranes are moist.      Pharynx: Oropharynx is clear.   Eyes:      Extraocular Movements: Extraocular movements intact.      Conjunctiva/sclera: Conjunctivae normal.      Pupils: Pupils are equal, round, and reactive to light.   Cardiovascular:      Rate and Rhythm: Normal rate and regular rhythm.      Pulses: Normal pulses.      Heart sounds: Normal heart sounds.   Pulmonary:      Effort: Pulmonary effort is normal.      Breath sounds: Normal breath sounds.   Abdominal:      General: Bowel sounds are normal. There is no distension.      Palpations: Abdomen is soft. There is no mass.      Tenderness: There is no abdominal tenderness. There is no guarding.   Musculoskeletal:         General: Normal range of motion.      Cervical back: Normal range of motion and neck supple. No rigidity or tenderness.      Right lower leg: No edema.      Left lower leg: No edema.   Lymphadenopathy:      Cervical: No cervical adenopathy.   Skin:     General: Skin is warm and dry.   Neurological:      General: No focal deficit present.      Mental Status: He is alert.   Psychiatric:          Mood and Affect: Mood normal.         Behavior: Behavior normal.         Health Maintenance         Date Due Completion Date    Eye Exam 08/05/2021 8/5/2020    COVID-19 Vaccine (6 - Pfizer series) 02/13/2023 10/13/2022    Colorectal Cancer Screening 03/03/2023 3/3/2021    Hemoglobin A1c 07/04/2023 1/4/2023    PROSTATE-SPECIFIC ANTIGEN 01/04/2024 1/4/2023    Lipid Panel 01/04/2024 1/4/2023    LDCT Lung Screen 02/22/2024 2/22/2023    Diabetes Urine Screening 05/03/2024 5/3/2023    High Dose Statin 06/07/2024 6/7/2023    TETANUS VACCINE 03/12/2028 3/12/2018              ASSESSMENT     67 y.o. male with     1. Establishing care with new doctor, encounter for    2. Immunodeficiency due to treatment with immunosuppressive medication    3. Kidney replaced by transplant    4. Paroxysmal atrial fibrillation    5. Primary hypertension    6. Long term (current) use of anticoagulants    7. Coronary artery disease involving native coronary artery of native heart without angina pectoris    8. Prediabetes    9. Postural tremor    10. Multiple lung nodules on CT    11. Syncope and collapse    12. Left foot pain        PLAN:     1. Establishing care with new doctor, encounter for  - Prior notes/labs/imaging reviewed.  - Reviewed patient's medical, surgical, family, and social history; chart updated.     3. Immunodeficiency due to treatment with immunosuppressive medication  - Daily Prograf for kidney transplant, continue follow up with transplant team.   - Ambulatory referral/consult to Dermatology; Future    4. Kidney replaced by transplant  - Stable,continue follow up with transplant team.     5. Paroxysmal atrial fibrillation  - RRR in clinic today, stable on metoprolol. On eliquis for stroke ppx. Continue follow up with cardiology.     6. Primary hypertension  - Controlled on Nifedipine, metoprolol, hydralazine, furosemide. Continue.     7. Long term (current) use of anticoagulants  - Stable on Eliquis, no signs of abnormal  bruising/bleeding. Continue.     8. Coronary artery disease involving native coronary artery of native heart without angina pectoris  - Stable on BB, statin, ASA. Continue follow up with cardiology.     9. Prediabetes  - Last A1c controlled, continue monitoring diet.     10. Postural tremor  - Stable. Continue follow up with Neurology.     11. Multiple lung nodules on CT  - Stable micornodules on CT chest in 2/2023. No follow up recommended.     12. Syncope and collapse  - Has been referred to Neurology, possible seizures? Precautions given.     13. Left foot pain  - Given list of OTC pain medications he can try including Tylenol, Salonpas patches, capsaicin cream, ice and heat.           RTC in 6 months, earlier PRN.      Anatoliy Colindres MD  Family Medicine  [facility]  06/29/2023    This document was created using voice recognition software ([employer] Fluency Direct). Although it may be edited, this document may contain errors related to incorrect recognition of the spoken word. Please call the physician if clarification is needed.           No follow-ups on file.

## 2023-06-13 NOTE — TELEPHONE ENCOUNTER
----- Message from Darcy Damon sent at 6/13/2023 12:55 PM CDT -----  Regarding: EKG ORDER  Please place and link EKG order to the EKG or Doctor appointment scheduled on  06/13/23 thanks. Darcy [phone]

## 2023-06-14 ENCOUNTER — OFFICE VISIT (OUTPATIENT)
Dept: DERMATOLOGY | Facility: CLINIC | Age: 68
End: 2023-06-14
Payer: MEDICARE

## 2023-06-14 DIAGNOSIS — D84.821 IMMUNODEFICIENCY DUE TO TREATMENT WITH IMMUNOSUPPRESSIVE MEDICATION: ICD-10-CM

## 2023-06-14 DIAGNOSIS — Z79.899 IMMUNODEFICIENCY DUE TO TREATMENT WITH IMMUNOSUPPRESSIVE MEDICATION: ICD-10-CM

## 2023-06-14 DIAGNOSIS — L21.9 SEBORRHEIC DERMATITIS: ICD-10-CM

## 2023-06-14 DIAGNOSIS — L82.1 SEBORRHEIC KERATOSES: ICD-10-CM

## 2023-06-14 DIAGNOSIS — L91.8 SKIN TAG: ICD-10-CM

## 2023-06-14 DIAGNOSIS — L85.3 DRY SKIN: Primary | ICD-10-CM

## 2023-06-14 DIAGNOSIS — Z76.89 ENCOUNTER FOR SKIN CARE: ICD-10-CM

## 2023-06-14 DIAGNOSIS — L72.9 CYST OF SKIN: ICD-10-CM

## 2023-06-14 PROCEDURE — 99204 PR OFFICE/OUTPT VISIT, NEW, LEVL IV, 45-59 MIN: ICD-10-PCS | Mod: S$GLB,,, | Performed by: DERMATOLOGY

## 2023-06-14 PROCEDURE — 3062F POS MACROALBUMINURIA REV: CPT | Mod: CPTII,S$GLB,, | Performed by: DERMATOLOGY

## 2023-06-14 PROCEDURE — 3044F HG A1C LEVEL LT 7.0%: CPT | Mod: CPTII,S$GLB,, | Performed by: DERMATOLOGY

## 2023-06-14 PROCEDURE — 99999 PR PBB SHADOW E&M-EST. PATIENT-LVL II: ICD-10-PCS | Mod: PBBFAC,,, | Performed by: DERMATOLOGY

## 2023-06-14 PROCEDURE — 3066F PR DOCUMENTATION OF TREATMENT FOR NEPHROPATHY: ICD-10-PCS | Mod: CPTII,S$GLB,, | Performed by: DERMATOLOGY

## 2023-06-14 PROCEDURE — 3044F PR MOST RECENT HEMOGLOBIN A1C LEVEL <7.0%: ICD-10-PCS | Mod: CPTII,S$GLB,, | Performed by: DERMATOLOGY

## 2023-06-14 PROCEDURE — 1159F PR MEDICATION LIST DOCUMENTED IN MEDICAL RECORD: ICD-10-PCS | Mod: CPTII,S$GLB,, | Performed by: DERMATOLOGY

## 2023-06-14 PROCEDURE — 3066F NEPHROPATHY DOC TX: CPT | Mod: CPTII,S$GLB,, | Performed by: DERMATOLOGY

## 2023-06-14 PROCEDURE — 3062F PR POS MACROALBUMINURIA RESULT DOCUMENTED/REVIEW: ICD-10-PCS | Mod: CPTII,S$GLB,, | Performed by: DERMATOLOGY

## 2023-06-14 PROCEDURE — 99204 OFFICE O/P NEW MOD 45 MIN: CPT | Mod: S$GLB,,, | Performed by: DERMATOLOGY

## 2023-06-14 PROCEDURE — 1159F MED LIST DOCD IN RCRD: CPT | Mod: CPTII,S$GLB,, | Performed by: DERMATOLOGY

## 2023-06-14 PROCEDURE — 99999 PR PBB SHADOW E&M-EST. PATIENT-LVL II: CPT | Mod: PBBFAC,,, | Performed by: DERMATOLOGY

## 2023-06-14 NOTE — PATIENT INSTRUCTIONS
Patient to start 2-5% crude coal tar shampoo (Tgel)  to be used as scalp soaks for at least 10 minutes, longer if possible, per their regular shampooing schedule.  Can also be applied as directed to other parts of the body.

## 2023-06-14 NOTE — PROGRESS NOTES
Subjective:      Patient ID:  Ibrahima Phelps is a 67 y.o. male who presents for   Chief Complaint   Patient presents with    Skin Check     TBSE     HPI    Review of Systems   Constitutional:  Positive for fatigue.   HENT: Negative.     Respiratory:  Positive for shortness of breath.    Musculoskeletal: Negative.    Skin:  Positive for dry skin.     Objective:   Physical Exam   Constitutional: He appears well-developed and well-nourished.   Eyes: No conjunctival no injection.   Neurological: He is alert and oriented to person, place, and time.   Psychiatric: He has a normal mood and affect.   Skin:   Areas Examined (abnormalities noted in diagram):   Scalp / Hair Palpated and Inspected  Head / Face Inspection Performed  Neck Inspection Performed  Chest / Axilla Inspection Performed  Abdomen Inspection Performed  Back Inspection Performed  RUE Inspected  LUE Inspection Performed  RLE Inspected  LLE Inspection Performed                   Diagram Legend     Erythematous scaling macule/papule c/w actinic keratosis       Vascular papule c/w angioma      Pigmented verrucoid papule/plaque c/w seborrheic keratosis      Yellow umbilicated papule c/w sebaceous hyperplasia      Irregularly shaped tan macule c/w lentigo     1-2 mm smooth white papules consistent with Milia      Movable subcutaneous cyst with punctum c/w epidermal inclusion cyst      Subcutaneous movable cyst c/w pilar cyst      Firm pink to brown papule c/w dermatofibroma      Pedunculated fleshy papule(s) c/w skin tag(s)      Evenly pigmented macule c/w junctional nevus     Mildly variegated pigmented, slightly irregular-bordered macule c/w mildly atypical nevus      Flesh colored to evenly pigmented papule c/w intradermal nevus       Pink pearly papule/plaque c/w basal cell carcinoma      Erythematous hyperkeratotic cursted plaque c/w SCC      Surgical scar with no sign of skin cancer recurrence      Open and closed comedones      Inflammatory papules and  pustules      Verrucoid papule consistent consistent with wart     Erythematous eczematous patches and plaques     Dystrophic onycholytic nail with subungual debris c/w onychomycosis     Umbilicated papule    Erythematous-base heme-crusted tan verrucoid plaque consistent with inflamed seborrheic keratosis     Erythematous Silvery Scaling Plaque c/w Psoriasis     See annotation      Assessment / Plan:        Dry skin  Good skin care regimen discussed including limiting to one bath or shower per day, using lukewarm water with mild soap and moisturization to skin once to twice daily.  Consider glycerin bar soap or Dove.  Consider organic coconut oil.  Independent historian was in exam room or on virtual today to provide information and assist in delivering therapy and treatment at home.    Immunodeficiency due to treatment with immunosuppressive medication  -     Ambulatory referral/consult to Dermatology  Previous [facility] labs and or records and notes reviewed and considered for their impact on our clinical decision making today.  Brochure given for patient education.  Patient to watch for any suspicious changes of skin or skin spots, including color changes, darkening, bleeding, scabbing, increase in size, pain, itch, or worsening.  Patient to come in for more evaluation if any such occurs.    Cyst of skin  Discussed with patient the likelihood that this lesion is an epidermal cyst caused by an involuted lining of skin with dead skin trapped inside.  Cysts do not have a malignant potential but can become infected and turn into abscesses with possible cellulitis.  Discussed the option of warm compresses if infected with oral antibiotics.  Discussed the option of surgical excision with scar, possible recurrence, hematoma, and infection.  Patient to consider these options.    Skin tag  Discussed with patient the benign nature of these lesions and that no treatment is indicated.  Poss nf.    Seborrheic  dermatitis  Scalp.  Discussed with patient the etiology and pathogenesis of the disease or skin lesion(s) and possible treatments and aggravators.    Reviewed with patient different treatment options and associated risks.  Proper application of medications and or care for affected area(s) and condition(s) reviewed.  Extra strength TGel regularly as soaks recommended for the scalp or other affected areas.    Seborrheic keratoses  Discussed with patient the benign nature of these lesions and that no treatment is indicated.    Encounter for skin care  Good skin care regimen discussed including limiting to one bath or shower per day, using lukewarm water with mild soap and moisturization to skin once to twice daily.  Consider glycerin bar soap or Dove.  Consider organic coconut oil.             Follow up in about 1 year (around 6/14/2024) for TBSE.

## 2023-06-15 ENCOUNTER — PATIENT MESSAGE (OUTPATIENT)
Dept: INTERNAL MEDICINE | Facility: CLINIC | Age: 68
End: 2023-06-15
Payer: MEDICARE

## 2023-06-15 DIAGNOSIS — M79.672 LEFT FOOT PAIN: Primary | ICD-10-CM

## 2023-06-15 NOTE — TELEPHONE ENCOUNTER
Called and spoke with pt, informed him of xray results. Pt expressed understanding, no further questions. Podiatry appt now scheduled

## 2023-06-19 PROBLEM — T83.511A URINARY TRACT INFECTION ASSOCIATED WITH INDWELLING URETHRAL CATHETER: Status: RESOLVED | Noted: 2023-03-17 | Resolved: 2023-06-19

## 2023-06-19 PROBLEM — N39.0 URINARY TRACT INFECTION ASSOCIATED WITH INDWELLING URETHRAL CATHETER: Status: RESOLVED | Noted: 2023-03-17 | Resolved: 2023-06-19

## 2023-06-20 ENCOUNTER — LAB VISIT (OUTPATIENT)
Dept: LAB | Facility: HOSPITAL | Age: 68
End: 2023-06-20
Attending: INTERNAL MEDICINE
Payer: MEDICARE

## 2023-06-20 ENCOUNTER — OFFICE VISIT (OUTPATIENT)
Dept: NEUROLOGY | Facility: CLINIC | Age: 68
End: 2023-06-20
Payer: MEDICARE

## 2023-06-20 VITALS
DIASTOLIC BLOOD PRESSURE: 82 MMHG | HEART RATE: 62 BPM | BODY MASS INDEX: 28.17 KG/M2 | HEIGHT: 72 IN | WEIGHT: 208 LBS | SYSTOLIC BLOOD PRESSURE: 158 MMHG

## 2023-06-20 DIAGNOSIS — Z94.0 KIDNEY REPLACED BY TRANSPLANT: ICD-10-CM

## 2023-06-20 DIAGNOSIS — R10.12 LUQ ABDOMINAL PAIN: ICD-10-CM

## 2023-06-20 DIAGNOSIS — R41.82 ALTERED MENTAL STATUS, UNSPECIFIED: ICD-10-CM

## 2023-06-20 DIAGNOSIS — R41.82 ALTERED MENTAL STATUS, UNSPECIFIED ALTERED MENTAL STATUS TYPE: ICD-10-CM

## 2023-06-20 DIAGNOSIS — R55 SYNCOPE AND COLLAPSE: Primary | ICD-10-CM

## 2023-06-20 DIAGNOSIS — D63.1 ANEMIA DUE TO STAGE 4 CHRONIC KIDNEY DISEASE: ICD-10-CM

## 2023-06-20 DIAGNOSIS — N18.4 ANEMIA DUE TO STAGE 4 CHRONIC KIDNEY DISEASE: ICD-10-CM

## 2023-06-20 DIAGNOSIS — N18.4 CKD (CHRONIC KIDNEY DISEASE) STAGE 4, GFR 15-29 ML/MIN: ICD-10-CM

## 2023-06-20 LAB
ALBUMIN SERPL BCP-MCNC: 2.8 G/DL (ref 3.5–5.2)
ANION GAP SERPL CALC-SCNC: 11 MMOL/L (ref 8–16)
BASOPHILS # BLD AUTO: 0.04 K/UL (ref 0–0.2)
BASOPHILS # BLD AUTO: 0.04 K/UL (ref 0–0.2)
BASOPHILS NFR BLD: 0.5 % (ref 0–1.9)
BASOPHILS NFR BLD: 0.5 % (ref 0–1.9)
BUN SERPL-MCNC: 27 MG/DL (ref 8–23)
CALCIUM SERPL-MCNC: 8.5 MG/DL (ref 8.7–10.5)
CHLORIDE SERPL-SCNC: 109 MMOL/L (ref 95–110)
CO2 SERPL-SCNC: 23 MMOL/L (ref 23–29)
CREAT SERPL-MCNC: 2.7 MG/DL (ref 0.5–1.4)
DIFFERENTIAL METHOD: ABNORMAL
DIFFERENTIAL METHOD: ABNORMAL
EOSINOPHIL # BLD AUTO: 0.6 K/UL (ref 0–0.5)
EOSINOPHIL # BLD AUTO: 0.6 K/UL (ref 0–0.5)
EOSINOPHIL NFR BLD: 7.2 % (ref 0–8)
EOSINOPHIL NFR BLD: 7.2 % (ref 0–8)
ERYTHROCYTE [DISTWIDTH] IN BLOOD BY AUTOMATED COUNT: 17.5 % (ref 11.5–14.5)
ERYTHROCYTE [DISTWIDTH] IN BLOOD BY AUTOMATED COUNT: 17.5 % (ref 11.5–14.5)
EST. GFR  (NO RACE VARIABLE): 24.9 ML/MIN/1.73 M^2
GLUCOSE SERPL-MCNC: 89 MG/DL (ref 70–110)
HCT VFR BLD AUTO: 34 % (ref 40–54)
HCT VFR BLD AUTO: 34 % (ref 40–54)
HGB BLD-MCNC: 10.4 G/DL (ref 14–18)
HGB BLD-MCNC: 10.4 G/DL (ref 14–18)
IMM GRANULOCYTES # BLD AUTO: 0.03 K/UL (ref 0–0.04)
IMM GRANULOCYTES # BLD AUTO: 0.03 K/UL (ref 0–0.04)
IMM GRANULOCYTES NFR BLD AUTO: 0.4 % (ref 0–0.5)
IMM GRANULOCYTES NFR BLD AUTO: 0.4 % (ref 0–0.5)
LYMPHOCYTES # BLD AUTO: 2.4 K/UL (ref 1–4.8)
LYMPHOCYTES # BLD AUTO: 2.4 K/UL (ref 1–4.8)
LYMPHOCYTES NFR BLD: 29.7 % (ref 18–48)
LYMPHOCYTES NFR BLD: 29.7 % (ref 18–48)
MCH RBC QN AUTO: 24.3 PG (ref 27–31)
MCH RBC QN AUTO: 24.3 PG (ref 27–31)
MCHC RBC AUTO-ENTMCNC: 30.6 G/DL (ref 32–36)
MCHC RBC AUTO-ENTMCNC: 30.6 G/DL (ref 32–36)
MCV RBC AUTO: 79 FL (ref 82–98)
MCV RBC AUTO: 79 FL (ref 82–98)
MONOCYTES # BLD AUTO: 0.7 K/UL (ref 0.3–1)
MONOCYTES # BLD AUTO: 0.7 K/UL (ref 0.3–1)
MONOCYTES NFR BLD: 8.4 % (ref 4–15)
MONOCYTES NFR BLD: 8.4 % (ref 4–15)
NEUTROPHILS # BLD AUTO: 4.4 K/UL (ref 1.8–7.7)
NEUTROPHILS # BLD AUTO: 4.4 K/UL (ref 1.8–7.7)
NEUTROPHILS NFR BLD: 53.8 % (ref 38–73)
NEUTROPHILS NFR BLD: 53.8 % (ref 38–73)
NRBC BLD-RTO: 0 /100 WBC
NRBC BLD-RTO: 0 /100 WBC
PHOSPHATE SERPL-MCNC: 3.9 MG/DL (ref 2.7–4.5)
PLATELET # BLD AUTO: 279 K/UL (ref 150–450)
PLATELET # BLD AUTO: 279 K/UL (ref 150–450)
PMV BLD AUTO: 12.4 FL (ref 9.2–12.9)
PMV BLD AUTO: 12.4 FL (ref 9.2–12.9)
POTASSIUM SERPL-SCNC: 4.2 MMOL/L (ref 3.5–5.1)
RBC # BLD AUTO: 4.28 M/UL (ref 4.6–6.2)
RBC # BLD AUTO: 4.28 M/UL (ref 4.6–6.2)
SODIUM SERPL-SCNC: 143 MMOL/L (ref 136–145)
TACROLIMUS BLD-MCNC: 2.3 NG/ML (ref 5–15)
WBC # BLD AUTO: 8.17 K/UL (ref 3.9–12.7)
WBC # BLD AUTO: 8.17 K/UL (ref 3.9–12.7)

## 2023-06-20 PROCEDURE — 99214 PR OFFICE/OUTPT VISIT, EST, LEVL IV, 30-39 MIN: ICD-10-PCS | Mod: S$GLB,,, | Performed by: PSYCHIATRY & NEUROLOGY

## 2023-06-20 PROCEDURE — 36415 COLL VENOUS BLD VENIPUNCTURE: CPT | Performed by: INTERNAL MEDICINE

## 2023-06-20 PROCEDURE — 3062F PR POS MACROALBUMINURIA RESULT DOCUMENTED/REVIEW: ICD-10-PCS | Mod: CPTII,S$GLB,, | Performed by: PSYCHIATRY & NEUROLOGY

## 2023-06-20 PROCEDURE — 99999 PR PBB SHADOW E&M-EST. PATIENT-LVL IV: CPT | Mod: PBBFAC,,, | Performed by: PSYCHIATRY & NEUROLOGY

## 2023-06-20 PROCEDURE — 3062F POS MACROALBUMINURIA REV: CPT | Mod: CPTII,S$GLB,, | Performed by: PSYCHIATRY & NEUROLOGY

## 2023-06-20 PROCEDURE — 3066F NEPHROPATHY DOC TX: CPT | Mod: CPTII,S$GLB,, | Performed by: PSYCHIATRY & NEUROLOGY

## 2023-06-20 PROCEDURE — 80197 ASSAY OF TACROLIMUS: CPT | Performed by: INTERNAL MEDICINE

## 2023-06-20 PROCEDURE — 3288F PR FALLS RISK ASSESSMENT DOCUMENTED: ICD-10-PCS | Mod: CPTII,S$GLB,, | Performed by: PSYCHIATRY & NEUROLOGY

## 2023-06-20 PROCEDURE — 85025 COMPLETE CBC W/AUTO DIFF WBC: CPT | Performed by: INTERNAL MEDICINE

## 2023-06-20 PROCEDURE — 3066F PR DOCUMENTATION OF TREATMENT FOR NEPHROPATHY: ICD-10-PCS | Mod: CPTII,S$GLB,, | Performed by: PSYCHIATRY & NEUROLOGY

## 2023-06-20 PROCEDURE — 1159F MED LIST DOCD IN RCRD: CPT | Mod: CPTII,S$GLB,, | Performed by: PSYCHIATRY & NEUROLOGY

## 2023-06-20 PROCEDURE — 3079F DIAST BP 80-89 MM HG: CPT | Mod: CPTII,S$GLB,, | Performed by: PSYCHIATRY & NEUROLOGY

## 2023-06-20 PROCEDURE — 3044F PR MOST RECENT HEMOGLOBIN A1C LEVEL <7.0%: ICD-10-PCS | Mod: CPTII,S$GLB,, | Performed by: PSYCHIATRY & NEUROLOGY

## 2023-06-20 PROCEDURE — 3044F HG A1C LEVEL LT 7.0%: CPT | Mod: CPTII,S$GLB,, | Performed by: PSYCHIATRY & NEUROLOGY

## 2023-06-20 PROCEDURE — 3077F SYST BP >= 140 MM HG: CPT | Mod: CPTII,S$GLB,, | Performed by: PSYCHIATRY & NEUROLOGY

## 2023-06-20 PROCEDURE — 1101F PT FALLS ASSESS-DOCD LE1/YR: CPT | Mod: CPTII,S$GLB,, | Performed by: PSYCHIATRY & NEUROLOGY

## 2023-06-20 PROCEDURE — 3008F BODY MASS INDEX DOCD: CPT | Mod: CPTII,S$GLB,, | Performed by: PSYCHIATRY & NEUROLOGY

## 2023-06-20 PROCEDURE — 1160F RVW MEDS BY RX/DR IN RCRD: CPT | Mod: CPTII,S$GLB,, | Performed by: PSYCHIATRY & NEUROLOGY

## 2023-06-20 PROCEDURE — 99214 OFFICE O/P EST MOD 30 MIN: CPT | Mod: S$GLB,,, | Performed by: PSYCHIATRY & NEUROLOGY

## 2023-06-20 PROCEDURE — 1126F AMNT PAIN NOTED NONE PRSNT: CPT | Mod: CPTII,S$GLB,, | Performed by: PSYCHIATRY & NEUROLOGY

## 2023-06-20 PROCEDURE — 3077F PR MOST RECENT SYSTOLIC BLOOD PRESSURE >= 140 MM HG: ICD-10-PCS | Mod: CPTII,S$GLB,, | Performed by: PSYCHIATRY & NEUROLOGY

## 2023-06-20 PROCEDURE — 1160F PR REVIEW ALL MEDS BY PRESCRIBER/CLIN PHARMACIST DOCUMENTED: ICD-10-PCS | Mod: CPTII,S$GLB,, | Performed by: PSYCHIATRY & NEUROLOGY

## 2023-06-20 PROCEDURE — 80069 RENAL FUNCTION PANEL: CPT | Performed by: INTERNAL MEDICINE

## 2023-06-20 PROCEDURE — 1159F PR MEDICATION LIST DOCUMENTED IN MEDICAL RECORD: ICD-10-PCS | Mod: CPTII,S$GLB,, | Performed by: PSYCHIATRY & NEUROLOGY

## 2023-06-20 PROCEDURE — 1101F PR PT FALLS ASSESS DOC 0-1 FALLS W/OUT INJ PAST YR: ICD-10-PCS | Mod: CPTII,S$GLB,, | Performed by: PSYCHIATRY & NEUROLOGY

## 2023-06-20 PROCEDURE — 3008F PR BODY MASS INDEX (BMI) DOCUMENTED: ICD-10-PCS | Mod: CPTII,S$GLB,, | Performed by: PSYCHIATRY & NEUROLOGY

## 2023-06-20 PROCEDURE — 3288F FALL RISK ASSESSMENT DOCD: CPT | Mod: CPTII,S$GLB,, | Performed by: PSYCHIATRY & NEUROLOGY

## 2023-06-20 PROCEDURE — 1126F PR PAIN SEVERITY QUANTIFIED, NO PAIN PRESENT: ICD-10-PCS | Mod: CPTII,S$GLB,, | Performed by: PSYCHIATRY & NEUROLOGY

## 2023-06-20 PROCEDURE — 99999 PR PBB SHADOW E&M-EST. PATIENT-LVL IV: ICD-10-PCS | Mod: PBBFAC,,, | Performed by: PSYCHIATRY & NEUROLOGY

## 2023-06-20 PROCEDURE — 3079F PR MOST RECENT DIASTOLIC BLOOD PRESSURE 80-89 MM HG: ICD-10-PCS | Mod: CPTII,S$GLB,, | Performed by: PSYCHIATRY & NEUROLOGY

## 2023-06-20 NOTE — PROGRESS NOTES
[facility] - NEUROLOGY 7TH [place] [facility], [place]    Date: 6/20/23  Patient Name: Ibrahima Phelps   MRN: [id number]   PCP: Connie Magdaleno  Referring Provider: Elvia Snell PA-C    Assessment:   Ibrahima Phelps is a 67 y.o. male presenting for evaluation of cognitive decline according repeated episodes of loss of consciousness.  Discussed with patient that we will prioritize workup of recurrent episodes of syncope while initiating workup for possible causes of cognitive decline.  Obtaining MRI brain with EEG carotid ultrasound along with extended cardiac monitoring.  Will obtain dementia screening labs while awaiting syncope workup.  Further workup pending results.    Plan:     Problem List Items Addressed This Visit    None  Visit Diagnoses       Syncope and collapse    -  Primary    Relevant Orders    EEG,w/awake & asleep record    Vitamin B12 (Completed)    Methylmalonic Acid, Serum    Vitamin B1    TSH (Completed)    RPR    US Carotid Bilateral    MRI Brain Epilepsy Without Contrast    Cardiac Monitor - 3-15 Day Adult (Cupid Only)    LUQ abdominal pain        Altered mental status, unspecified altered mental status type        Relevant Orders    Vitamin B12 (Completed)    Altered mental status, unspecified        Relevant Orders    Vitamin B12 (Completed)    TSH (Completed)            Marcos Brunson MD  [facility]   Department of Neurology    Patient note was created using MModal Dictation.  Any errors in syntax or even information may not have been identified and edited on initial review prior to signing this note.  Subjective:   Patient seen in consultation at the request of Elvia Snell PA-C for the evaluation of cognitive decline. A copy of this note will be sent to the referring physician.        HPI:   Mr. Ibrahima Phelps is a 67 y.o. male Presenting for evaluation of episodes of altered awareness.  Patient presents today with his wife who contributes to  the history.  The patient, who has a known history of cognitive decline and postural tremor has noted repeated episodes of the last 2-3 years of blacking out.  The patient states that he feels profoundly tired before blacking out.  His most recent episode was 2 weeks ago.  His wife states that she heard a gurgling noise and then heard the patient fall to the ground.  When she reached him he was breathing heavily and appeared stiff all over.  She states that while he remains stiff he appeared to try to respond to her.  She noted another recent episode in which the patient was asleep on the sofa.  She was unable to wake him from sleep and he was profoundly disoriented upon waking.  She reports that during a prior episode that occurred while the patient was seated in a chair, he exhibited brief jerking movements.  She otherwise denies tonic-clonic activity.  They denied tongue biting and incontinence.  He does have a known history of AFib but has not worn any form of prolonged cardiac monitor.    PAST MEDICAL HISTORY:  Past Medical History:   Diagnosis Date    Atrial fibrillation     CAD (coronary artery disease) 2006    MI in 2006    CHF (congestive heart failure) 2017    CKD (chronic kidney disease) stage 3, GFR 30-59 ml/min     Dr. Latif.  in transplanted kidney    COVID-19 2/7/2023    Diverticulosis     Hyperlipidemia     Hypertension     Keloid cicatrix     Metabolic bone disease     Other emphysema 10/1/2019    Pericarditis     S/P kidney transplant 1992    x2 (1992 & 2005),    Thrombocytopenia     Thyroid disease     Tobacco use 09/05/2014       PAST SURGICAL HISTORY:  Past Surgical History:   Procedure Laterality Date    CARDIOVERSION  04/30/15    CHOLECYSTECTOMY      COLONOSCOPY  April 20, 2011    Diverticulosis, repeat recommended in 3 yrs., repeat colonoscopy 2014 revealed 2 polyps.  He should return in 5 years.    COLONOSCOPY N/A 3/13/2020    Procedure: COLONOSCOPY;  Surgeon: Chon Casper MD;   "Location: [facility] (4TH FLR);  Service: Endoscopy;  Laterality: N/A;  ok to hold coumadin x5days-see telephone encounter 2/4/20-tb    COLONOSCOPY N/A 2/4/2021    Procedure: COLONOSCOPY;  Surgeon: Chon Casper MD;  Location: [facility] (4TH FLR);  Service: Endoscopy;  Laterality: N/A;  Eliquis - per Dr. GAVIN Blunt ok to hold x 2 days and "restarted asap"- ERW  last prep "poor", [id number] ordered/ Pt requests Dr. Casper  prep ins. mailed - COVID screening on 2/1/21 PCW- ERW    COLONOSCOPY N/A 3/3/2021    Procedure: COLONOSCOPY;  Surgeon: Chon Casper MD;  [facility]  Service: Endoscopy;  Laterality: N/A;  Patient with his second poor bowel pre and has poor prep today.  If patient not intersted or can't get colonoscopy tomorrow will need constipation bowel prep and will need to restart his Eliquis today.Thanks,Chon     per Dr Blunt-ok to hold Eliquis 2 days prior      COVID     CORONARY ANGIOPLASTY WITH STENT PLACEMENT  9/13/2006    KIDNEY TRANSPLANT  2005    PARATHYROIDECTOMY      TREATMENT OF CARDIAC ARRHYTHMIA N/A 3/28/2022    Procedure: CARDIOVERSION;  Surgeon: Migue Blunt MD;  Location: [facility];  Service: Cardiology;  Laterality: N/A;  AF, DCC, MAC, GP, 3 PREP*RODRIGO deferred pt 100% compliant*       CURRENT MEDS:  Current Outpatient Medications   Medication Sig Dispense Refill    acetaminophen (TYLENOL) 500 MG tablet Take 1 tablet (500 mg total) by mouth every 6 (six) hours as needed for Pain. 20 tablet 0    albuterol (PROVENTIL) 2.5 mg /3 mL (0.083 %) nebulizer solution Take 3 mLs (2.5 mg total) by nebulization every 6 (six) hours as needed for Wheezing. Rescue 9 mL 6    albuterol (VENTOLIN HFA) 90 mcg/actuation inhaler Inhale 2 puffs into the lungs every 6 (six) hours as needed for Wheezing or Shortness of Breath. Rescue 18 g 3    aspirin (ECOTRIN) 81 MG EC tablet Take 1 tablet by mouth Daily.      atorvastatin (LIPITOR) 10 MG tablet TAKE 1 TABLET BY MOUTH EVERY DAY 90 tablet 1 "    calcium carbonate (CALCIUM 600 ORAL) Take by mouth. Pt taking 1200 daily      colchicine (MITIGARE) 0.6 mg Cap One po BID prn gout flare up to 3 days 15 capsule 11    doxazosin (CARDURA) 4 MG tablet TAKE 1 TABLET EVERY 12 HOURS 180 tablet 3    ELIQUIS 5 mg Tab TAKE 1 TABLET BY MOUTH TWICE A  tablet 3    famotidine (PEPCID) 20 MG tablet TAKE 1 TABLET TWICE DAILY 180 tablet 3    fluticasone propion-salmeterol 115-21 mcg/dose (ADVAIR HFA) 115-21 mcg/actuation HFAA inhaler Inhale 2 puffs into the lungs every 12 (twelve) hours. Controller 12 g 5    fluticasone propionate (FLONASE) 50 mcg/actuation nasal spray 1 spray (50 mcg total) by Each Nostril route once daily. 18.2 mL 0    furosemide (LASIX) 80 MG tablet Take 1 tablet (80 mg total) by mouth 2 (two) times daily. (Patient taking differently: Take 40 mg by mouth 2 (two) times daily.) 60 tablet 0    gabapentin (NEURONTIN) 100 MG capsule Take 1 capsule (100 mg total) by mouth every evening. 30 capsule 0    hydrALAZINE (APRESOLINE) 50 MG tablet TAKE 1 TABLET (50 MG TOTAL) BY MOUTH 3 (THREE) TIMES DAILY. 270 tablet 3    metoprolol succinate (TOPROL-XL) 25 MG 24 hr tablet Take 1 tablet (25 mg total) by mouth once daily. 90 tablet 3    multivitamin (THERAGRAN) per tablet Take 1 tablet by mouth Daily.      NIFEdipine (PROCARDIA-XL) 60 MG (OSM) 24 hr tablet Take 1 tablet (60 mg total) by mouth 2 (two) times a day. 14 tablet 0    omeprazole (PRILOSEC) 20 MG capsule Take 1 capsule (20 mg total) by mouth once daily. 14 capsule 0    paricalcitoL (ZEMPLAR) 1 MCG capsule TAKE 1 CAPSULE ON MONDAY, WEDNESDAY AND FRIDAY 36 capsule 3    predniSONE (DELTASONE) 5 MG tablet TAKE 1 TABLET BY MOUTH EVERY DAY IN THE MORNING 90 tablet 3    tacrolimus (PROGRAF) 1 MG Cap Take 2 capsules (2 mg total) by mouth every morning AND 2 capsules (2 mg total) every evening. 270 capsule 3    vitamin D 1000 units Tab Take 370 mg by mouth 2 (two) times daily. 2 tablets BID      ammonium lactate 12  % Crea Apply 1 g topically once daily. 140 g 1    pulse oximeter (PULSE OXIMETER) device by Apply Externally route 2 (two) times a day. Use twice daily at 8 AM and 3 PM and record the value in MyChart as directed. 1 each 0     No current facility-administered medications for this visit.       ALLERGIES:  Review of patient's allergies indicates:   Allergen Reactions    Ace inhibitors Swelling    Verapamil Other (See Comments)     Other reaction(s): Unknown    Povidone-iodine Itching       FAMILY HISTORY:  Family History   Problem Relation Age of Onset    No Known Problems Sister     No Known Problems Brother     Thyroid disease Mother         s/p surgery    Heart disease Father         had pacemaker    No Known Problems Sister     Kidney failure Sister     Kidney disease Sister     No Known Problems Sister     Kidney disease Brother     Kidney failure Brother         s/p transplant    Diabetes Mellitus Neg Hx     Stroke Neg Hx     Heart attack Neg Hx     Cancer Neg Hx     Celiac disease Neg Hx     Cirrhosis Neg Hx     Colon cancer Neg Hx     Esophageal cancer Neg Hx     Inflammatory bowel disease Neg Hx     Rectal cancer Neg Hx     Stomach cancer Neg Hx     Ulcerative colitis Neg Hx     Liver disease Neg Hx     Liver cancer Neg Hx     Crohn's disease Neg Hx     Melanoma Neg Hx        SOCIAL HISTORY:  Social History     Tobacco Use    Smoking status: Former     Packs/day: 0.50     Years: 40.00     Pack years: 20.00     Types: Cigarettes     Quit date: 2022     Years since quittin.3    Smokeless tobacco: Never    Tobacco comments:     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     Patient is currently retired   Substance Use Topics    Alcohol use: No    Drug use: No       Review of Systems:  12 system review of systems is negative except for the symptoms mentioned in HPI.      Objective:     Vitals:    23 0927   BP: (!) 158/82   Pulse: 62   Weight: 94.3 kg (208 lb 0.1  oz)   Height: 6' (1.829 m)     General: NAD, well nourished   Eyes: no tearing, discharge, no erythema   ENT: moist mucous membranes of the oral cavity, nares patent    Neck: Supple, full range of motion  Cardiovascular: Warm and well perfused, pulses equal and symmetrical  Lungs: Normal work of breathing, normal chest wall excursions  Skin: No rash, lesions, or breakdown on exposed skin  Psychiatry: Mood and affect are appropriate   Abdomen: soft, non tender, non distended  Extremeties: No cyanosis, clubbing or edema.    Neurological   MENTAL STATUS: Alert and oriented to person, place, and time. Attention and concentration within normal limits. Speech without dysarthria, able to name and repeat without difficulty. Recent and remote memory fair   CRANIAL NERVES: Visual fields intact. PERRL. EOMI. Facial sensation intact. Face symmetrical. Hearing grossly intact. Full shoulder shrug bilaterally. Tongue protrudes midline   SENSORY: Sensation is intact to light touch throughout.  Joint position perception intact. Negative Romberg.   MOTOR: Normal bulk and tone.  5/5 deltoid, biceps, triceps, interosseous, hand  bilaterally. 5/5 iliopsoas, knee extension/flexion, foot dorsi/plantarflexion bilaterally. Postural tremor of bilateral UEs.   REFLEXES: Symmetric and 1+ throughout.  CEREBELLAR/COORDINATION/GAIT: Gait steady w Finger to nose intact. Normal rapid alternating movements.

## 2023-06-23 ENCOUNTER — CLINICAL SUPPORT (OUTPATIENT)
Dept: CARDIOLOGY | Facility: HOSPITAL | Age: 68
End: 2023-06-23
Attending: PSYCHIATRY & NEUROLOGY
Payer: MEDICARE

## 2023-06-23 DIAGNOSIS — R55 SYNCOPE AND COLLAPSE: ICD-10-CM

## 2023-06-23 PROCEDURE — 93248 CV CARDIAC MONITOR - 3-15 DAY ADULT (CUPID ONLY): ICD-10-PCS | Mod: ,,, | Performed by: STUDENT IN AN ORGANIZED HEALTH CARE EDUCATION/TRAINING PROGRAM

## 2023-06-23 PROCEDURE — 93248 EXT ECG>7D<15D REV&INTERPJ: CPT | Mod: ,,, | Performed by: STUDENT IN AN ORGANIZED HEALTH CARE EDUCATION/TRAINING PROGRAM

## 2023-06-29 RX ORDER — MULTIVIT WITH MINERALS/HERBS
1 TABLET ORAL DAILY
Qty: 90 TABLET | Refills: 3 | Status: SHIPPED | OUTPATIENT
Start: 2023-06-29

## 2023-07-03 ENCOUNTER — HOSPITAL ENCOUNTER (OUTPATIENT)
Dept: RADIOLOGY | Facility: HOSPITAL | Age: 68
Discharge: HOME | End: 2023-07-03
Attending: PSYCHIATRY & NEUROLOGY
Payer: MEDICARE

## 2023-07-03 DIAGNOSIS — R55 SYNCOPE AND COLLAPSE: ICD-10-CM

## 2023-07-03 PROCEDURE — 93880 US CAROTID BILATERAL: ICD-10-PCS | Mod: 26,,, | Performed by: RADIOLOGY

## 2023-07-03 PROCEDURE — 93880 EXTRACRANIAL BILAT STUDY: CPT | Mod: TC

## 2023-07-03 PROCEDURE — 93880 EXTRACRANIAL BILAT STUDY: CPT | Mod: 26,,, | Performed by: RADIOLOGY

## 2023-07-06 DIAGNOSIS — R55 SYNCOPE AND COLLAPSE: Primary | ICD-10-CM

## 2023-07-10 ENCOUNTER — OFFICE VISIT (OUTPATIENT)
Dept: ELECTROPHYSIOLOGY | Facility: CLINIC | Age: 68
End: 2023-07-10
Payer: MEDICARE

## 2023-07-10 ENCOUNTER — PATIENT MESSAGE (OUTPATIENT)
Dept: NEPHROLOGY | Facility: CLINIC | Age: 68
End: 2023-07-10
Payer: MEDICARE

## 2023-07-10 ENCOUNTER — HOSPITAL ENCOUNTER (OUTPATIENT)
Dept: CARDIOLOGY | Facility: CLINIC | Age: 68
Discharge: HOME | End: 2023-07-10
Payer: MEDICARE

## 2023-07-10 VITALS
WEIGHT: 208.75 LBS | BODY MASS INDEX: 28.32 KG/M2 | SYSTOLIC BLOOD PRESSURE: 140 MMHG | DIASTOLIC BLOOD PRESSURE: 78 MMHG | HEART RATE: 90 BPM

## 2023-07-10 DIAGNOSIS — I27.20 PULMONARY HTN: ICD-10-CM

## 2023-07-10 DIAGNOSIS — Z87.891 HISTORY OF TOBACCO USE: ICD-10-CM

## 2023-07-10 DIAGNOSIS — E03.9 HYPOTHYROIDISM, UNSPECIFIED TYPE: ICD-10-CM

## 2023-07-10 DIAGNOSIS — I70.0 AORTIC ATHEROSCLEROSIS: ICD-10-CM

## 2023-07-10 DIAGNOSIS — R07.89 MUSCULOSKELETAL CHEST PAIN: ICD-10-CM

## 2023-07-10 DIAGNOSIS — D84.821 IMMUNODEFICIENCY DUE TO TREATMENT WITH IMMUNOSUPPRESSIVE MEDICATION: ICD-10-CM

## 2023-07-10 DIAGNOSIS — I48.0 PAROXYSMAL ATRIAL FIBRILLATION: Primary | ICD-10-CM

## 2023-07-10 DIAGNOSIS — K57.90 DIVERTICULOSIS: ICD-10-CM

## 2023-07-10 DIAGNOSIS — E78.2 MIXED HYPERLIPIDEMIA: ICD-10-CM

## 2023-07-10 DIAGNOSIS — N18.4 CKD (CHRONIC KIDNEY DISEASE), STAGE IV: ICD-10-CM

## 2023-07-10 DIAGNOSIS — N25.81 SECONDARY RENAL HYPERPARATHYROIDISM: ICD-10-CM

## 2023-07-10 DIAGNOSIS — R06.09 DYSPNEA ON EXERTION: ICD-10-CM

## 2023-07-10 DIAGNOSIS — I25.10 CORONARY ARTERY DISEASE INVOLVING NATIVE CORONARY ARTERY OF NATIVE HEART WITHOUT ANGINA PECTORIS: ICD-10-CM

## 2023-07-10 DIAGNOSIS — I47.10 SUPRAVENTRICULAR TACHYCARDIA: ICD-10-CM

## 2023-07-10 DIAGNOSIS — I50.32 CHRONIC HEART FAILURE WITH PRESERVED EJECTION FRACTION: ICD-10-CM

## 2023-07-10 DIAGNOSIS — I10 PRIMARY HYPERTENSION: ICD-10-CM

## 2023-07-10 DIAGNOSIS — Z79.899 IMMUNODEFICIENCY DUE TO TREATMENT WITH IMMUNOSUPPRESSIVE MEDICATION: ICD-10-CM

## 2023-07-10 DIAGNOSIS — K21.9 GASTROESOPHAGEAL REFLUX DISEASE, UNSPECIFIED WHETHER ESOPHAGITIS PRESENT: ICD-10-CM

## 2023-07-10 DIAGNOSIS — E74.39 GLUCOSE INTOLERANCE: ICD-10-CM

## 2023-07-10 DIAGNOSIS — E66.3 OVERWEIGHT (BMI 25.0-29.9): ICD-10-CM

## 2023-07-10 DIAGNOSIS — J44.9 CHRONIC OBSTRUCTIVE PULMONARY DISEASE, UNSPECIFIED COPD TYPE: ICD-10-CM

## 2023-07-10 DIAGNOSIS — Z94.0 H/O KIDNEY TRANSPLANT: ICD-10-CM

## 2023-07-10 DIAGNOSIS — Z95.5 HISTORY OF CORONARY ANGIOPLASTY WITH INSERTION OF STENT: ICD-10-CM

## 2023-07-10 DIAGNOSIS — I48.91 ATRIAL FIBRILLATION, UNSPECIFIED TYPE: ICD-10-CM

## 2023-07-10 DIAGNOSIS — Z79.01 LONG TERM (CURRENT) USE OF ANTICOAGULANTS: ICD-10-CM

## 2023-07-10 PROCEDURE — 99999 PR PBB SHADOW E&M-EST. PATIENT-LVL IV: CPT | Mod: PBBFAC,,, | Performed by: STUDENT IN AN ORGANIZED HEALTH CARE EDUCATION/TRAINING PROGRAM

## 2023-07-10 PROCEDURE — 93005 RHYTHM STRIP: ICD-10-PCS | Mod: S$GLB,,, | Performed by: STUDENT IN AN ORGANIZED HEALTH CARE EDUCATION/TRAINING PROGRAM

## 2023-07-10 PROCEDURE — 1159F MED LIST DOCD IN RCRD: CPT | Mod: CPTII,S$GLB,, | Performed by: STUDENT IN AN ORGANIZED HEALTH CARE EDUCATION/TRAINING PROGRAM

## 2023-07-10 PROCEDURE — 3077F SYST BP >= 140 MM HG: CPT | Mod: CPTII,S$GLB,, | Performed by: STUDENT IN AN ORGANIZED HEALTH CARE EDUCATION/TRAINING PROGRAM

## 2023-07-10 PROCEDURE — 3078F DIAST BP <80 MM HG: CPT | Mod: CPTII,S$GLB,, | Performed by: STUDENT IN AN ORGANIZED HEALTH CARE EDUCATION/TRAINING PROGRAM

## 2023-07-10 PROCEDURE — 99999 PR PBB SHADOW E&M-EST. PATIENT-LVL IV: ICD-10-PCS | Mod: PBBFAC,,, | Performed by: STUDENT IN AN ORGANIZED HEALTH CARE EDUCATION/TRAINING PROGRAM

## 2023-07-10 PROCEDURE — 3066F NEPHROPATHY DOC TX: CPT | Mod: CPTII,S$GLB,, | Performed by: STUDENT IN AN ORGANIZED HEALTH CARE EDUCATION/TRAINING PROGRAM

## 2023-07-10 PROCEDURE — 1101F PR PT FALLS ASSESS DOC 0-1 FALLS W/OUT INJ PAST YR: ICD-10-PCS | Mod: CPTII,S$GLB,, | Performed by: STUDENT IN AN ORGANIZED HEALTH CARE EDUCATION/TRAINING PROGRAM

## 2023-07-10 PROCEDURE — 93005 ELECTROCARDIOGRAM TRACING: CPT | Mod: S$GLB,,, | Performed by: STUDENT IN AN ORGANIZED HEALTH CARE EDUCATION/TRAINING PROGRAM

## 2023-07-10 PROCEDURE — 3044F PR MOST RECENT HEMOGLOBIN A1C LEVEL <7.0%: ICD-10-PCS | Mod: CPTII,S$GLB,, | Performed by: STUDENT IN AN ORGANIZED HEALTH CARE EDUCATION/TRAINING PROGRAM

## 2023-07-10 PROCEDURE — 1126F PR PAIN SEVERITY QUANTIFIED, NO PAIN PRESENT: ICD-10-PCS | Mod: CPTII,S$GLB,, | Performed by: STUDENT IN AN ORGANIZED HEALTH CARE EDUCATION/TRAINING PROGRAM

## 2023-07-10 PROCEDURE — 3062F PR POS MACROALBUMINURIA RESULT DOCUMENTED/REVIEW: ICD-10-PCS | Mod: CPTII,S$GLB,, | Performed by: STUDENT IN AN ORGANIZED HEALTH CARE EDUCATION/TRAINING PROGRAM

## 2023-07-10 PROCEDURE — 3062F POS MACROALBUMINURIA REV: CPT | Mod: CPTII,S$GLB,, | Performed by: STUDENT IN AN ORGANIZED HEALTH CARE EDUCATION/TRAINING PROGRAM

## 2023-07-10 PROCEDURE — 3078F PR MOST RECENT DIASTOLIC BLOOD PRESSURE < 80 MM HG: ICD-10-PCS | Mod: CPTII,S$GLB,, | Performed by: STUDENT IN AN ORGANIZED HEALTH CARE EDUCATION/TRAINING PROGRAM

## 2023-07-10 PROCEDURE — 3288F FALL RISK ASSESSMENT DOCD: CPT | Mod: CPTII,S$GLB,, | Performed by: STUDENT IN AN ORGANIZED HEALTH CARE EDUCATION/TRAINING PROGRAM

## 2023-07-10 PROCEDURE — 3008F BODY MASS INDEX DOCD: CPT | Mod: CPTII,S$GLB,, | Performed by: STUDENT IN AN ORGANIZED HEALTH CARE EDUCATION/TRAINING PROGRAM

## 2023-07-10 PROCEDURE — 1101F PT FALLS ASSESS-DOCD LE1/YR: CPT | Mod: CPTII,S$GLB,, | Performed by: STUDENT IN AN ORGANIZED HEALTH CARE EDUCATION/TRAINING PROGRAM

## 2023-07-10 PROCEDURE — 3077F PR MOST RECENT SYSTOLIC BLOOD PRESSURE >= 140 MM HG: ICD-10-PCS | Mod: CPTII,S$GLB,, | Performed by: STUDENT IN AN ORGANIZED HEALTH CARE EDUCATION/TRAINING PROGRAM

## 2023-07-10 PROCEDURE — 99214 PR OFFICE/OUTPT VISIT, EST, LEVL IV, 30-39 MIN: ICD-10-PCS | Mod: S$GLB,,, | Performed by: STUDENT IN AN ORGANIZED HEALTH CARE EDUCATION/TRAINING PROGRAM

## 2023-07-10 PROCEDURE — 3044F HG A1C LEVEL LT 7.0%: CPT | Mod: CPTII,S$GLB,, | Performed by: STUDENT IN AN ORGANIZED HEALTH CARE EDUCATION/TRAINING PROGRAM

## 2023-07-10 PROCEDURE — 1159F PR MEDICATION LIST DOCUMENTED IN MEDICAL RECORD: ICD-10-PCS | Mod: CPTII,S$GLB,, | Performed by: STUDENT IN AN ORGANIZED HEALTH CARE EDUCATION/TRAINING PROGRAM

## 2023-07-10 PROCEDURE — 3066F PR DOCUMENTATION OF TREATMENT FOR NEPHROPATHY: ICD-10-PCS | Mod: CPTII,S$GLB,, | Performed by: STUDENT IN AN ORGANIZED HEALTH CARE EDUCATION/TRAINING PROGRAM

## 2023-07-10 PROCEDURE — 93010 RHYTHM STRIP: ICD-10-PCS | Mod: S$GLB,,, | Performed by: INTERNAL MEDICINE

## 2023-07-10 PROCEDURE — 1126F AMNT PAIN NOTED NONE PRSNT: CPT | Mod: CPTII,S$GLB,, | Performed by: STUDENT IN AN ORGANIZED HEALTH CARE EDUCATION/TRAINING PROGRAM

## 2023-07-10 PROCEDURE — 99214 OFFICE O/P EST MOD 30 MIN: CPT | Mod: S$GLB,,, | Performed by: STUDENT IN AN ORGANIZED HEALTH CARE EDUCATION/TRAINING PROGRAM

## 2023-07-10 PROCEDURE — 3008F PR BODY MASS INDEX (BMI) DOCUMENTED: ICD-10-PCS | Mod: CPTII,S$GLB,, | Performed by: STUDENT IN AN ORGANIZED HEALTH CARE EDUCATION/TRAINING PROGRAM

## 2023-07-10 PROCEDURE — 3288F PR FALLS RISK ASSESSMENT DOCUMENTED: ICD-10-PCS | Mod: CPTII,S$GLB,, | Performed by: STUDENT IN AN ORGANIZED HEALTH CARE EDUCATION/TRAINING PROGRAM

## 2023-07-10 PROCEDURE — 93010 ELECTROCARDIOGRAM REPORT: CPT | Mod: S$GLB,,, | Performed by: INTERNAL MEDICINE

## 2023-07-10 NOTE — PROGRESS NOTES
"Electrophysiology Clinic Note    Reason for follow-up patient visit: Ongoing evaluation and recommendations regarding paroxysmal atrial fibrillation.    PRESENTING HISTORY:     History of Present Illness:  Mr. Ibrahima Phelps is a darshan 67-year-old gentleman who returns to clinic today for ongoing evaluation and recommendations regarding paroxysmal atrial fibrillation. He has a past medical history significant for paroxysmal atrial fibrillation, heart failure with preserved ejection fraction with most recent LVEF 65%, coronary artery disease with PCI in 2006, hypertension, hyperlipidemia, aortic atherosclerosis, glucose intolerance, CKD stage IV with a history of prior renal transplant x2 in 2002 and 2005 with chronic immunosuppressive medication, secondary hyperparathyroidism, diverticulosis, COPD, GERD, hypothyroidism, gout, a history of tobacco use, and obesity.     He was previously followed in EP with Dr. Blunt in regards to his atrial fibrillation, but has transitioned his care into my clinic. He was previously diagnosed with paroxysmal atrial fibrillation with RVR and underwent a successful cardioversion on 3/28/2022. He was the initiated on antiarrhythmic therapy with flecainide 50mg po BID - this dose was decreased to the 50mg po BID dosing in the setting of reported fatigue. He has remained on oral anticoagulation with apixaban with no adverse bleeding events reported. He previously reported an episode of musculoskeletal chest pain with arm movement of the left arm that lasted approximately 5 seconds before spontaneously resolving. He has additionally reported episodes of "blacking out" and cognitive decline, and is being closely evaluated with neurology with Dr. Brunson.     Mr. Phelps presents to clinic today with his wife. In discussion with Mr. Phelps today, he tells me that he is feeling overall well, but wonders if he has "gone out of rhythm again". On ECG evaluation, he has returned to rate-controlled " "atrial fibrillation. He recently had a puncture wound of the left foot and was started on antibiotics and has completed a 10 day course of Keflex. Around this time on 6/7/2023, he feels that he may have gone back into atrial fibrillation. He denies any recent episodes of dizziness, lightheadedness, syncope, or presyncope. He has previously experienced a syncopal episode where he was noted to have fallen asleep on the couch with his wife unable to awaken him, with witnessed jerking movements. He denies any recurrent chest pain or chest discomfort, nausea or vomiting, orthopnea, or PND. He occasionally feels "skipped beats", and can tell that he has gone back into atrial fibrillation. He reports that occasionally he has lower extremity edema, and has remained compliant with his home lasix dose. He has not been weighing himself, but he was encouraged to frequently weigh himself in an effort to monitor his fluid status, and was recently evaluated by his nephrology team. He continues to wear compression stockings and reports that this is helpful in preventing dependent edema. He reports baseline mild shortness of breath and dyspnea with exertion. He can climb one flight of stairs, but is limited based on hip, knee, and back osteoarthritis.     Review of Systems:  Review of Systems   Constitutional:  Positive for activity change.        Mild exercise intolerance.   HENT:  Positive for postnasal drip and rhinorrhea. Negative for nasal congestion, nosebleeds, sinus pressure/congestion, sneezing and sore throat.    Respiratory:  Positive for cough and shortness of breath. Negative for apnea, chest tightness and wheezing.    Cardiovascular:  Positive for palpitations and leg swelling. Negative for chest pain.   Gastrointestinal:  Negative for abdominal distention, abdominal pain, blood in stool, change in bowel habit, constipation, diarrhea, nausea, vomiting and change in bowel habit.   Genitourinary:  Negative for dysuria " "and hematuria.   Musculoskeletal:  Positive for arthralgias and back pain. Negative for gait problem.   Neurological:  Positive for dizziness, syncope, light-headedness and memory loss. Negative for seizures, weakness, headaches, coordination difficulties and coordination difficulties.      PAST HISTORY:     Past Medical History:   Diagnosis Date    Atrial fibrillation     CAD (coronary artery disease) 2006    MI in 2006    CHF (congestive heart failure) 2017    CKD (chronic kidney disease) stage 3, GFR 30-59 ml/min     Dr. Latif.  in transplanted kidney    COVID-19 2/7/2023    Diverticulosis     Hyperlipidemia     Hypertension     Keloid cicatrix     Metabolic bone disease     Other emphysema 10/1/2019    Pericarditis     S/P kidney transplant 1992    x2 (1992 & 2005),    Thrombocytopenia     Thyroid disease     Tobacco use 09/05/2014       Past Surgical History:   Procedure Laterality Date    CARDIOVERSION  04/30/15    CHOLECYSTECTOMY      COLONOSCOPY  April 20, 2011    Diverticulosis, repeat recommended in 3 yrs., repeat colonoscopy 2014 revealed 2 polyps.  He should return in 5 years.    COLONOSCOPY N/A 3/13/2020    Procedure: COLONOSCOPY;  Surgeon: Chon Casper MD;  Location: [facility] (4TH FLR);  Service: Endoscopy;  Laterality: N/A;  ok to hold coumadin x5days-see telephone encounter 2/4/20-tb    COLONOSCOPY N/A 2/4/2021    Procedure: COLONOSCOPY;  Surgeon: Chon Casper MD;  Location: [facility] (4TH FLR);  Service: Endoscopy;  Laterality: N/A;  Eliquis - per Dr. GAVIN Blunt ok to hold x 2 days and "restarted asap"- ERW  last prep "poor", [id number] ordered/ Pt requests Dr. Casper  prep ins. mailed - COVID screening on 2/1/21 PCW- ERW    COLONOSCOPY N/A 3/3/2021    Procedure: COLONOSCOPY;  Surgeon: Chon Casper MD;  Location: [facility] (4TH FLR);  Service: Endoscopy;  Laterality: N/A;  Patient with his second poor bowel pre and has poor prep today.  If patient not intersted or can't get " colonoscopy tomorrow will need constipation bowel prep and will need to restart his Eliquis today.Thanks,Chon     per Dr Blunt-ok to hold Eliquis 2 days prior      COVID     CORONARY ANGIOPLASTY WITH STENT PLACEMENT  9/13/2006    KIDNEY TRANSPLANT  2005    PARATHYROIDECTOMY      TREATMENT OF CARDIAC ARRHYTHMIA N/A 3/28/2022    Procedure: CARDIOVERSION;  Surgeon: Migue Blunt MD;  Location: [facility] EP LAB;  Service: Cardiology;  Laterality: N/A;  AF, DCC, MAC, GP, 3 PREP*RODRIGO deferred pt 100% compliant*       Family History:  Family History   Problem Relation Age of Onset    No Known Problems Sister     No Known Problems Brother     Thyroid disease Mother         s/p surgery    Heart disease Father         had pacemaker    No Known Problems Sister     Kidney failure Sister     Kidney disease Sister     No Known Problems Sister     Kidney disease Brother     Kidney failure Brother         s/p transplant    Diabetes Mellitus Neg Hx     Stroke Neg Hx     Heart attack Neg Hx     Cancer Neg Hx     Celiac disease Neg Hx     Cirrhosis Neg Hx     Colon cancer Neg Hx     Esophageal cancer Neg Hx     Inflammatory bowel disease Neg Hx     Rectal cancer Neg Hx     Stomach cancer Neg Hx     Ulcerative colitis Neg Hx     Liver disease Neg Hx     Liver cancer Neg Hx     Crohn's disease Neg Hx     Melanoma Neg Hx        Social History:  He  reports that he quit smoking about 16 months ago. His smoking use included cigarettes. He has a 20.00 pack-year smoking history. He has never used smokeless tobacco. He reports that he does not drink alcohol and does not use drugs.      MEDICATIONS & ALLERGIES:     Review of patient's allergies indicates:   Allergen Reactions    Ace inhibitors Swelling    Verapamil Other (See Comments)     Other reaction(s): Unknown    Povidone-iodine Itching       Current Outpatient Medications on File Prior to Visit   Medication Sig Dispense Refill    acetaminophen (TYLENOL) 500 MG tablet Take 1 tablet (500 mg  total) by mouth every 6 (six) hours as needed for Pain. 20 tablet 0    albuterol (PROVENTIL) 2.5 mg /3 mL (0.083 %) nebulizer solution Take 3 mLs (2.5 mg total) by nebulization every 6 (six) hours as needed for Wheezing. Rescue 9 mL 6    albuterol (VENTOLIN HFA) 90 mcg/actuation inhaler Inhale 2 puffs into the lungs every 6 (six) hours as needed for Wheezing or Shortness of Breath. Rescue 18 g 3    ammonium lactate 12 % Crea Apply 1 g topically once daily. 140 g 1    aspirin (ECOTRIN) 81 MG EC tablet Take 1 tablet by mouth Daily.      atorvastatin (LIPITOR) 10 MG tablet TAKE 1 TABLET BY MOUTH EVERY DAY 90 tablet 1    b complex vitamins (B COMPLEX-VITAMIN B12) tablet Take 1 tablet by mouth once daily. 90 tablet 3    calcium carbonate (CALCIUM 600 ORAL) Take by mouth. Pt taking 1200 daily      colchicine (MITIGARE) 0.6 mg Cap One po BID prn gout flare up to 3 days 15 capsule 11    doxazosin (CARDURA) 4 MG tablet TAKE 1 TABLET EVERY 12 HOURS 180 tablet 3    ELIQUIS 5 mg Tab TAKE 1 TABLET BY MOUTH TWICE A  tablet 3    famotidine (PEPCID) 20 MG tablet TAKE 1 TABLET TWICE DAILY 180 tablet 3    fluticasone propion-salmeterol 115-21 mcg/dose (ADVAIR HFA) 115-21 mcg/actuation HFAA inhaler Inhale 2 puffs into the lungs every 12 (twelve) hours. Controller 12 g 5    fluticasone propionate (FLONASE) 50 mcg/actuation nasal spray 1 spray (50 mcg total) by Each Nostril route once daily. 18.2 mL 0    furosemide (LASIX) 80 MG tablet Take 1 tablet (80 mg total) by mouth 2 (two) times daily. (Patient taking differently: Take 40 mg by mouth 2 (two) times daily.) 60 tablet 0    gabapentin (NEURONTIN) 100 MG capsule Take 1 capsule (100 mg total) by mouth every evening. 30 capsule 0    hydrALAZINE (APRESOLINE) 50 MG tablet TAKE 1 TABLET (50 MG TOTAL) BY MOUTH 3 (THREE) TIMES DAILY. 270 tablet 3    metoprolol succinate (TOPROL-XL) 25 MG 24 hr tablet Take 1 tablet (25 mg total) by mouth once daily. 90 tablet 3    multivitamin  (THERAGRAN) per tablet Take 1 tablet by mouth Daily.      NIFEdipine (PROCARDIA-XL) 60 MG (OSM) 24 hr tablet Take 1 tablet (60 mg total) by mouth 2 (two) times a day. 14 tablet 0    omeprazole (PRILOSEC) 20 MG capsule Take 1 capsule (20 mg total) by mouth once daily. 14 capsule 0    paricalcitoL (ZEMPLAR) 1 MCG capsule TAKE 1 CAPSULE ON MONDAY, WEDNESDAY AND FRIDAY 36 capsule 3    predniSONE (DELTASONE) 5 MG tablet TAKE 1 TABLET BY MOUTH EVERY DAY IN THE MORNING 90 tablet 3    pulse oximeter (PULSE OXIMETER) device by Apply Externally route 2 (two) times a day. Use twice daily at 8 AM and 3 PM and record the value in [employer] as directed. 1 each 0    tacrolimus (PROGRAF) 1 MG Cap Take 2 capsules (2 mg total) by mouth every morning AND 2 capsules (2 mg total) every evening. 270 capsule 3    vitamin D 1000 units Tab Take 370 mg by mouth 2 (two) times daily. 2 tablets BID       No current facility-administered medications on file prior to visit.        OBJECTIVE:     Vital Signs:  BP (!) 140/78   Pulse 90   Wt 94.7 kg (208 lb 12.4 oz)   BMI 28.32 kg/m²     Physical Exam:  Physical Exam  Constitutional:       General: He is not in acute distress.     Appearance: Normal appearance. He is not ill-appearing or diaphoretic.      Comments: Well-appearing man in NAD.   HENT:      Head: Normocephalic and atraumatic.      Nose: Nose normal.      Mouth/Throat:      Mouth: Mucous membranes are moist.      Pharynx: Oropharynx is clear.   Eyes:      Pupils: Pupils are equal, round, and reactive to light.   Cardiovascular:      Rate and Rhythm: Normal rate. Rhythm irregular.      Pulses: Normal pulses.      Heart sounds: Normal heart sounds. No murmur heard.    No friction rub. No gallop.      Comments: Irregularly irregular rhythm on right radial artery palpation.   Pulmonary:      Effort: Pulmonary effort is normal. No respiratory distress.      Breath sounds: Normal breath sounds. No wheezing, rhonchi or rales.   Chest:       Chest wall: No tenderness.   Abdominal:      General: There is no distension.      Palpations: Abdomen is soft.      Tenderness: There is no abdominal tenderness.   Musculoskeletal:         General: No swelling or tenderness.      Cervical back: Normal range of motion.      Right lower leg: Edema present.      Left lower leg: Edema present.      Comments: Trace bilateral pedal edema, wearing compression stockings in clinic.    Skin:     General: Skin is warm and dry.      Findings: No erythema, lesion or rash.   Neurological:      General: No focal deficit present.      Mental Status: He is alert and oriented to person, place, and time. Mental status is at baseline.      Motor: No weakness.      Gait: Gait normal.   Psychiatric:         Mood and Affect: Mood normal.         Behavior: Behavior normal.        Laboratory Data:  Lab Results   Component Value Date    WBC 8.17 06/20/2023    WBC 8.17 06/20/2023    HGB 10.4 (L) 06/20/2023    HGB 10.4 (L) 06/20/2023    HCT 34.0 (L) 06/20/2023    HCT 34.0 (L) 06/20/2023    MCV 79 (L) 06/20/2023    MCV 79 (L) 06/20/2023     06/20/2023     06/20/2023     Lab Results   Component Value Date    GLU 89 06/20/2023     06/20/2023    K 4.2 06/20/2023     06/20/2023    CO2 23 06/20/2023    BUN 27 (H) 06/20/2023    CREATININE 2.7 (H) 06/20/2023    CALCIUM 8.5 (L) 06/20/2023    MG 2.0 03/18/2023     Lab Results   Component Value Date    INR 1.2 02/07/2023    INR 1.1 03/21/2022    INR 1.2 06/17/2020    INR 1.2 06/17/2020       Pertinent Cardiac Data:  ECG: Atrial fibrillation with rate of 90 bpm, LVH with repolarization changes QRS 94 ms, QT/QTc 370/452 ms, nonspecific ST changes.     Resting 2D Transthoracic Echocardiogram - 3/23/2020:  Concentric left ventricular remodeling.  Normal left ventricular systolic function. The estimated ejection fraction is 60%.  Normal right ventricular systolic function.  Normal LV diastolic function.  Aortic sclerosis without  significant stenosis.  Intermediate central venous pressure (8 mmHg).    Pharmacologic Nuclear Cardiac Stress Test - SPECT - 6/29/2020:    The perfusion scan is free of evidence from myocardial ischemia or injury.    Gated perfusion images showed an ejection fraction of 63% at rest and 67% post stress. Normal ejection fraction is greater than 51%.    There is normal wall motion at rest and post stress.    LV cavity size is normal at rest and normal at stress.    The EKG portion of this study is abnormal but not diagnostic.    The patient reported no chest pain during the stress test.    Arrhythmias during stress: rare PACs, occasional PVCs.    There are no prior studies for comparison.    Resting 2D Transthoracic Echocardiogram - 3/21/2022:  Moderate left atrial enlargement.  The left ventricle is normal in size with concentric remodeling and normal systolic function.  The estimated ejection fraction is 55%.  There are segmental left ventricular wall motion abnormalities : akinetic basal inferior wall.  Indeterminate left ventricular diastolic function.  Mild right atrial enlargement.  Normal right ventricular size with normal right ventricular systolic function.  There is mild aortic valve stenosis.  Aortic valve area is 1.84 cm2; peak velocity is 2.60 m/s; mean gradient is 13 mmHg.  Mild aortic regurgitation.  Mild pulmonic regurgitation.  The estimated PA systolic pressure is 25 mmHg.  Normal central venous pressure (3 mmHg).    Pharmacologic Nuclear Cardiac Stress Test - SPECT - 4/28/2022:    Normal myocardial perfusion scan. There is no evidence of myocardial ischemia or infarction.    The visually estimated ejection fraction is normal at rest and normal during stress.    There is normal wall motion at rest and post stress.    LV cavity size is normal at rest and normal at stress.    The EKG portion of this study is abnormal but not diagnostic.    The patient reported no chest pain during the stress test.     There were no arrhythmias during stress.    When compared to the previous study from 6/29/2020, there are no significant changes.    Resting 2D Transthoracic Echocardiogram - 2/20/2023:  The left ventricle is normal in size with normal systolic function. The estimated ejection fraction is 65%.  Normal right ventricular size with normal right ventricular systolic function.  Grade II left ventricular diastolic dysfunction.  Severe left atrial enlargement.  There is mild aortic valve stenosis. Aortic valve area is 1.8 cm2; peak velocity is 2.9 m/s; mean gradient is 15 mmHg.  Pulmonary HTN - the estimated PA systolic pressure is > 41mm Hg. (The IVC was not well visualized.)      ASSESSMENT & PLAN:   Mr. Ibrahima Phelps is a darshan 67-year-old gentleman who returns to clinic today for ongoing evaluation and recommendations regarding paroxysmal atrial fibrillation. He has a past medical history significant for paroxysmal atrial fibrillation, heart failure with preserved ejection fraction with most recent LVEF 65%, coronary artery disease with PCI in 2006, hypertension, hyperlipidemia, aortic atherosclerosis, glucose intolerance, CKD stage IV with a history of prior renal transplant x2 in 2002 and 2005 with chronic immunosuppressive medication, secondary hyperparathyroidism, diverticulosis, COPD, GERD, hypothyroidism, gout, a history of tobacco use, and obesity. Unfortunately, he has returned to symptomatic, rate-controlled atrial fibrillation.     - We discussed the pathophysiology of atrial fibrillation; specifically, we discussed paroxysmal atrial fibrillation and the concept that some patients may experience paroxysms of atrial fibrillation interrupting periods of sinus rhythm. We reviewed that there is an increased risk of CVA with atrial fibrillation.  - He has returned to symptomatic atrial fibrillation. We discussed undergoing another RODRIGO and cardioversion for restoration of sinus rhythm. We discussed this procedure  in detail, and reviewed the relative risks, benefits, and alternative treatment options. Mr. Phelps voices understanding and is in agreement with proceeding, and informed consent was obtained.    - He has remained on dose-reduced flecainide 50mg po BID with return of his atrial fibrillation. We will discontinue this agent at this time and allow this medication to wash out. We will plan for amiodarone loading and maintenance following his cardioversion.  - He remains on metoprolol succinate 25mg po daily. This may need to be adjusted following restoration of sinus rhythm and initiation of amiodarone.   - His [id number] is 4 (HFpEF, HTN, aortic atherosclerosis, male gender, age 65-74), portending an annual adjusted risk of CVA of 4%. He remains on uninterrupted apixaban therapy with no bleeding events reported.   - We discussed the possibility of catheter ablation with pulmonary vein isolation should he continue to have symptoms and AF despite AAD therapy. He voices understanding, although he would like to remain on rhythm control with medication at this time.   - Possible underlying drivers of atrial fibrillation were addressed at this appointment, including recommendations for weight loss - now a class I recommendation. Review of laboratory data revealed stable and acceptable TSH at 1.494. This episode of atrial fibrillation may be secondary to his recent puncture injury of his left foot with a course of oral antibiotics. A request for a sleep study was deferred by the patient today.    - Following his cardioversion, we may consider obtaining a cardiac monitor for ongoing evaluation of his syncopal episodes.   - He will continue to follow with his neurologist, nephrologist, PCP, and general cardiology team for ongoing evaluation and recommendations regarding his chronic comorbid conditions.      This patient will present to the EP laboratory to undergo RODRIGO/cardioversion with initiation of amiodarone therapy. His  flecainide will be discontinued at this time. He will remain on uninterrupted oral anticoagulation with apixaban. All questions and concerns were addressed at this encounter.     Signing Physician:       CHARLEY Parmar MD  Electrophysiology Attending

## 2023-07-11 RX ORDER — COLCHICINE 0.6 MG/1
CAPSULE ORAL
Qty: 15 CAPSULE | Refills: 11 | Status: SHIPPED | OUTPATIENT
Start: 2023-07-11

## 2023-07-12 ENCOUNTER — PATIENT MESSAGE (OUTPATIENT)
Dept: ELECTROPHYSIOLOGY | Facility: CLINIC | Age: 68
End: 2023-07-12
Payer: MEDICARE

## 2023-07-12 ENCOUNTER — TELEPHONE (OUTPATIENT)
Dept: ADMINISTRATIVE | Facility: HOSPITAL | Age: 68
End: 2023-07-12
Payer: MEDICARE

## 2023-07-12 DIAGNOSIS — E11.9 TYPE 2 DIABETES MELLITUS WITHOUT COMPLICATION: ICD-10-CM

## 2023-07-12 DIAGNOSIS — I48.19 PERSISTENT ATRIAL FIBRILLATION: Primary | ICD-10-CM

## 2023-07-12 NOTE — TELEPHONE ENCOUNTER
I spoke with patient and scheduled his procedure (RODRIGO/DCCV) for 7/17/2023. Procedure details reviewed and instructions will be sent via patient portal as requested.

## 2023-07-13 ENCOUNTER — LAB VISIT (OUTPATIENT)
Dept: PRIMARY CARE CLINIC | Facility: CLINIC | Age: 68
End: 2023-07-13
Payer: MEDICARE

## 2023-07-13 DIAGNOSIS — I48.19 PERSISTENT ATRIAL FIBRILLATION: ICD-10-CM

## 2023-07-13 LAB
ANION GAP SERPL CALC-SCNC: 12 MMOL/L (ref 8–16)
APTT PPP: 32.6 SEC (ref 21–32)
BASOPHILS # BLD AUTO: 0.02 K/UL (ref 0–0.2)
BASOPHILS NFR BLD: 0.3 % (ref 0–1.9)
BUN SERPL-MCNC: 26 MG/DL (ref 8–23)
CALCIUM SERPL-MCNC: 8.4 MG/DL (ref 8.7–10.5)
CHLORIDE SERPL-SCNC: 105 MMOL/L (ref 95–110)
CO2 SERPL-SCNC: 23 MMOL/L (ref 23–29)
CREAT SERPL-MCNC: 3.1 MG/DL (ref 0.5–1.4)
DIFFERENTIAL METHOD: ABNORMAL
EOSINOPHIL # BLD AUTO: 0.7 K/UL (ref 0–0.5)
EOSINOPHIL NFR BLD: 8.7 % (ref 0–8)
ERYTHROCYTE [DISTWIDTH] IN BLOOD BY AUTOMATED COUNT: 17.9 % (ref 11.5–14.5)
EST. GFR  (NO RACE VARIABLE): 21.1 ML/MIN/1.73 M^2
GLUCOSE SERPL-MCNC: 89 MG/DL (ref 70–110)
HCT VFR BLD AUTO: 32.9 % (ref 40–54)
HGB BLD-MCNC: 10 G/DL (ref 14–18)
IMM GRANULOCYTES # BLD AUTO: 0.02 K/UL (ref 0–0.04)
IMM GRANULOCYTES NFR BLD AUTO: 0.3 % (ref 0–0.5)
INR PPP: 1.1 (ref 0.8–1.2)
LYMPHOCYTES # BLD AUTO: 1.8 K/UL (ref 1–4.8)
LYMPHOCYTES NFR BLD: 23 % (ref 18–48)
MCH RBC QN AUTO: 24.3 PG (ref 27–31)
MCHC RBC AUTO-ENTMCNC: 30.4 G/DL (ref 32–36)
MCV RBC AUTO: 80 FL (ref 82–98)
MONOCYTES # BLD AUTO: 0.6 K/UL (ref 0.3–1)
MONOCYTES NFR BLD: 7.4 % (ref 4–15)
NEUTROPHILS # BLD AUTO: 4.6 K/UL (ref 1.8–7.7)
NEUTROPHILS NFR BLD: 60.3 % (ref 38–73)
NRBC BLD-RTO: 0 /100 WBC
PLATELET # BLD AUTO: 185 K/UL (ref 150–450)
PMV BLD AUTO: ABNORMAL FL (ref 9.2–12.9)
POTASSIUM SERPL-SCNC: 3.8 MMOL/L (ref 3.5–5.1)
PROTHROMBIN TIME: 11.9 SEC (ref 9–12.5)
RBC # BLD AUTO: 4.12 M/UL (ref 4.6–6.2)
SODIUM SERPL-SCNC: 140 MMOL/L (ref 136–145)
WBC # BLD AUTO: 7.6 K/UL (ref 3.9–12.7)

## 2023-07-13 PROCEDURE — 85730 THROMBOPLASTIN TIME PARTIAL: CPT | Performed by: STUDENT IN AN ORGANIZED HEALTH CARE EDUCATION/TRAINING PROGRAM

## 2023-07-13 PROCEDURE — 85610 PROTHROMBIN TIME: CPT | Performed by: STUDENT IN AN ORGANIZED HEALTH CARE EDUCATION/TRAINING PROGRAM

## 2023-07-13 PROCEDURE — 36415 COLL VENOUS BLD VENIPUNCTURE: CPT | Performed by: STUDENT IN AN ORGANIZED HEALTH CARE EDUCATION/TRAINING PROGRAM

## 2023-07-13 PROCEDURE — 85025 COMPLETE CBC W/AUTO DIFF WBC: CPT | Performed by: STUDENT IN AN ORGANIZED HEALTH CARE EDUCATION/TRAINING PROGRAM

## 2023-07-13 PROCEDURE — 80048 BASIC METABOLIC PNL TOTAL CA: CPT | Performed by: STUDENT IN AN ORGANIZED HEALTH CARE EDUCATION/TRAINING PROGRAM

## 2023-07-14 ENCOUNTER — TELEPHONE (OUTPATIENT)
Dept: ELECTROPHYSIOLOGY | Facility: CLINIC | Age: 68
End: 2023-07-14
Payer: MEDICARE

## 2023-07-14 NOTE — TELEPHONE ENCOUNTER
Spoke to Ms. Phelps, patient's wife    CONFIRMED procedure arrival time of 0700    Reiterated instructions including:  -Directions to check in desk  -NPO after midnight night prior to procedure  -Pre-procedure LABS done, no alerts  -Confirmed no fever, cough, or shortness of breath    Patient verbalized understanding of above and appreciated the call.

## 2023-07-17 ENCOUNTER — HOSPITAL ENCOUNTER (OUTPATIENT)
Facility: HOSPITAL | Age: 68
Discharge: HOME | End: 2023-07-17
Attending: STUDENT IN AN ORGANIZED HEALTH CARE EDUCATION/TRAINING PROGRAM | Admitting: STUDENT IN AN ORGANIZED HEALTH CARE EDUCATION/TRAINING PROGRAM
Payer: MEDICARE

## 2023-07-17 ENCOUNTER — PATIENT MESSAGE (OUTPATIENT)
Dept: NEPHROLOGY | Facility: CLINIC | Age: 68
End: 2023-07-17
Payer: MEDICARE

## 2023-07-17 DIAGNOSIS — G25.2 POSTURAL TREMOR: ICD-10-CM

## 2023-07-17 DIAGNOSIS — I48.19 PERSISTENT ATRIAL FIBRILLATION: ICD-10-CM

## 2023-07-17 DIAGNOSIS — Z94.0 KIDNEY REPLACED BY TRANSPLANT: Primary | ICD-10-CM

## 2023-07-17 DIAGNOSIS — I48.0 PAROXYSMAL ATRIAL FIBRILLATION: Primary | ICD-10-CM

## 2023-07-17 DIAGNOSIS — I48.0 PAROXYSMAL A-FIB: ICD-10-CM

## 2023-07-17 DIAGNOSIS — I48.19 ATRIAL FIBRILLATION, PERSISTENT: ICD-10-CM

## 2023-07-17 PROCEDURE — 93005 ELECTROCARDIOGRAM TRACING: CPT

## 2023-07-17 PROCEDURE — 93010 EKG 12-LEAD: ICD-10-PCS | Mod: ,,, | Performed by: INTERNAL MEDICINE

## 2023-07-17 PROCEDURE — 93010 ELECTROCARDIOGRAM REPORT: CPT | Mod: ,,, | Performed by: INTERNAL MEDICINE

## 2023-07-17 RX ORDER — SODIUM CHLORIDE 0.9 % (FLUSH) 0.9 %
3 SYRINGE (ML) INJECTION
Status: CANCELLED | OUTPATIENT
Start: 2023-07-17

## 2023-07-17 RX ORDER — AMIODARONE HYDROCHLORIDE 200 MG/1
TABLET ORAL
Qty: 90 TABLET | Refills: 0 | Status: ON HOLD | OUTPATIENT
Start: 2023-07-17 | End: 2023-09-01 | Stop reason: HOSPADM

## 2023-07-17 NOTE — HPI
Ibrahima Phelps 67 y.o. presents for RODRIGO/DCCV.  Patient's presenting EKG: Sinus Rhythm-- 63 with PACs.  Procedures canceled.

## 2023-07-17 NOTE — HOSPITAL COURSE
Presents for RODRIGO/DCCV  for atrial fibrillation. Presenting EKG: Sinus Rhythm with PACs; 63 bpm; Procedures canceled.  D/c instructions provided to patient, specifically to continue anticoagulant  and start amio load.  Patient verbalized understanding.

## 2023-07-17 NOTE — DISCHARGE SUMMARY
[facility] - Short Stay Cardiac Unit  Cardiac Electrophysiology  Discharge Summary      Patient Name: Ibrahima Phelps  MRN: [id number]  Admission Date: 7/17/2023  Hospital Length of Stay: 0 days  Discharge Date and Time: 7/17/2023  9:22 AM  Attending Physician: TJ Parmar MD   Discharging Provider: Chela Lorenzo NP  Primary Care Physician: nAatoliy Colindres MD    HPI:   Ibrahima Phelps 67 y.o. presents for RODRIGO/DCCV.  Patient's presenting EKG: Sinus Rhythm-- 63 with PACs.  Procedures canceled.       Procedure(s) (LRB):  Cardioversion or Defibrillation (N/A)  Transesophageal echo (RODRIGO) intra-procedure log documentation (N/A)   Procedures canceled-- patient presented  In sinus rhythm    Electrophysiology Procedure    Result Date: 7/17/2023  Aborted invasive cardiology procedure- see Procedure Log link for details.    Intra-Procedure Documentation    Result Date: 7/17/2023  Aborted invasive cardiology procedure- see Procedure Log link for details.    Hospital Course:  Presents for RODRIGO/DCCV  for atrial fibrillation. Presenting EKG: Sinus Rhythm with PACs; 63 bpm; Procedures canceled.  D/c instructions provided to patient, specifically to continue anticoagulant  and start amio load.  Patient verbalized understanding.        Goals of Care Treatment Preferences:  Code Status: Full Code    Health care agent: Pt's wife is CRISTY  Health care agent number: No value filed.        What is most important right now is to focus on spending time at home, remaining as independent as possible, symptom/pain control, quality of life, even if it means sacrificing a little time.  Accordingly, we have decided that the best plan to meet the patient's goals includes continuing with treatment.    Significant Diagnostic Studies: Cardiac Graphics: presenting ECG: Sinus Rhythm; 63 bpm    Pending Diagnostic Studies:     None          Final Active Diagnoses:    Diagnosis Date Noted POA    PRINCIPAL PROBLEM:  Paroxysmal atrial fibrillation [I48.0]  09/25/2017 Yes      Problems Resolved During this Admission:     Cardiac/Vascular  * Paroxysmal atrial fibrillation  Presents for RODRIGO/DCCV-- Presenting EKG-- Sinus rhythm with PACs-- 63pm  Procedures canceled  Amio load  continue Alysia DOMINGUEZ/noemy Bunch in 4 weeks      Discharged Condition: stable    Disposition: Home or Self Care    Follow Up:   Follow-up Information     Kerri Bunch NP Follow up in 4 week(s).    Specialty: Cardiology  Why: Follow up post cardioversion; amiodarone initiation  Contact information:  [address]  [facility] [address]  [phone]                       Patient Instructions:      Diet Cardiac     Notify your health care provider if you experience any of the following:  temperature >100.4     Cardiac Monitor - 3-15 Day Adult (Cupid Only)   Standing Status: Future Standing Exp. Date: 07/17/24     Order Specific Question Answer Comments   Duration: 14 days    Release to patient Immediate      Activity as tolerated     Medications:  Reconciled Home Medications:      Medication List      START taking these medications    amiodarone 200 MG Tab  Commonly known as: PACERONE  Take 1 tablet (200 mg total) by mouth 2 (two) times daily for 14 days, THEN 1 tablet (200 mg total) once daily.  Start taking on: July 17, 2023        CONTINUE taking these medications    acetaminophen 500 MG tablet  Commonly known as: TYLENOL  Take 1 tablet (500 mg total) by mouth every 6 (six) hours as needed for Pain.     ADVAIR -21 mcg/actuation Hfaa inhaler  Generic drug: fluticasone propion-salmeterol 115-21 mcg/dose  Inhale 2 puffs into the lungs every 12 (twelve) hours. Controller     * albuterol 90 mcg/actuation inhaler  Commonly known as: VENTOLIN HFA  Inhale 2 puffs into the lungs every 6 (six) hours as needed for Wheezing or Shortness of Breath. Rescue     * albuterol 2.5 mg /3 mL (0.083 %) nebulizer solution  Commonly known as: PROVENTIL  Take 3 mLs (2.5 mg total) by nebulization every 6  (six) hours as needed for Wheezing. Rescue     ammonium lactate 12 % Crea  Apply 1 g topically once daily.     aspirin 81 MG EC tablet  Commonly known as: ECOTRIN  Take 1 tablet by mouth Daily.     atorvastatin 10 MG tablet  Commonly known as: LIPITOR  TAKE 1 TABLET BY MOUTH EVERY DAY     b complex vitamins tablet  Commonly known as: B COMPLEX-VITAMIN B12  Take 1 tablet by mouth once daily.     CALCIUM 600 ORAL  Take by mouth. Pt taking 1200 daily     doxazosin 4 MG tablet  Commonly known as: CARDURA  TAKE 1 TABLET EVERY 12 HOURS     ELIQUIS 5 mg Tab  Generic drug: apixaban  TAKE 1 TABLET BY MOUTH TWICE A DAY     famotidine 20 MG tablet  Commonly known as: PEPCID  TAKE 1 TABLET TWICE DAILY     fluticasone propionate 50 mcg/actuation nasal spray  Commonly known as: FLONASE  1 spray (50 mcg total) by Each Nostril route once daily.     furosemide 80 MG tablet  Commonly known as: LASIX  Take 1 tablet (80 mg total) by mouth 2 (two) times daily.     gabapentin 100 MG capsule  Commonly known as: NEURONTIN  Take 1 capsule (100 mg total) by mouth every evening.     hydrALAZINE 50 MG tablet  Commonly known as: APRESOLINE  TAKE 1 TABLET (50 MG TOTAL) BY MOUTH 3 (THREE) TIMES DAILY.     metoprolol succinate 25 MG 24 hr tablet  Commonly known as: TOPROL-XL  Take 1 tablet (25 mg total) by mouth once daily.     MITIGARE 0.6 mg Cap  Generic drug: colchicine  TAKE 1 CAPSULE BY MOUTH TWICE A DAY AS NEEDED GOUT FLARE UP TO 3 DAYS     multivitamin per tablet  Commonly known as: THERAGRAN  Take 1 tablet by mouth Daily.     NIFEdipine 60 MG (OSM) 24 hr tablet  Commonly known as: PROCARDIA-XL  Take 1 tablet (60 mg total) by mouth 2 (two) times a day.     omeprazole 20 MG capsule  Commonly known as: PRILOSEC  Take 1 capsule (20 mg total) by mouth once daily.     paricalcitoL 1 MCG capsule  Commonly known as: ZEMPLAR  TAKE 1 CAPSULE ON MONDAY, WEDNESDAY AND FRIDAY     predniSONE 5 MG tablet  Commonly known as: DELTASONE  TAKE 1 TABLET BY  MOUTH EVERY DAY IN THE MORNING     pulse oximeter device  Commonly known as: pulse oximeter  by Apply Externally route 2 (two) times a day. Use twice daily at 8 AM and 3 PM and record the value in MyChart as directed.     tacrolimus 1 MG Cap  Commonly known as: PROGRAF  Take 2 capsules (2 mg total) by mouth every morning AND 2 capsules (2 mg total) every evening.     vitamin D 1000 units Tab  Commonly known as: VITAMIN D3  Take 370 mg by mouth 2 (two) times daily. 2 tablets BID         * This list has 2 medication(s) that are the same as other medications prescribed for you. Read the directions carefully, and ask your doctor or other care provider to review them with you.                Time spent on the discharge of patient: 20 minutes    Chela Lorenzo NP  Cardiac Electrophysiology  [facility] - Short Stay Cardiac Unit

## 2023-07-17 NOTE — PLAN OF CARE
Cardiology Plan of Care     Patient here for RODRIGO +/- DCCV for atrial fibrillation. In sinus rhythm.     RODRIGO canceled.     Hector Vang MD  Cardiovascular Disease PGY-V  [facility]

## 2023-07-17 NOTE — ASSESSMENT & PLAN NOTE
Presents for RODRIGO/DCCV-- Presenting EKG-- Sinus rhythm with PACs-- 63pm  Procedures canceled  Amio load  continue Alysia DOMINGUEZ/noemy Bunch in 4 weeks

## 2023-07-17 NOTE — NURSING
EKG TRICE Astudillo notified.  Procedure cancelled.  Cardiac monitor cancelled.  Discharge instructions and medlist given.  Patient denies  need for escort or wheelchair off unit.  Off unit walking for discharge.

## 2023-07-20 ENCOUNTER — PATIENT MESSAGE (OUTPATIENT)
Dept: ELECTROPHYSIOLOGY | Facility: CLINIC | Age: 68
End: 2023-07-20
Payer: MEDICARE

## 2023-07-20 ENCOUNTER — OFFICE VISIT (OUTPATIENT)
Dept: CARDIOLOGY | Facility: CLINIC | Age: 68
End: 2023-07-20
Payer: MEDICARE

## 2023-07-20 ENCOUNTER — TELEPHONE (OUTPATIENT)
Dept: ELECTROPHYSIOLOGY | Facility: CLINIC | Age: 68
End: 2023-07-20
Payer: MEDICARE

## 2023-07-20 VITALS
SYSTOLIC BLOOD PRESSURE: 132 MMHG | DIASTOLIC BLOOD PRESSURE: 72 MMHG | WEIGHT: 210.13 LBS | HEART RATE: 72 BPM | BODY MASS INDEX: 27.85 KG/M2 | HEIGHT: 73 IN

## 2023-07-20 DIAGNOSIS — Z72.0 TOBACCO USE: ICD-10-CM

## 2023-07-20 DIAGNOSIS — I48.0 PAROXYSMAL ATRIAL FIBRILLATION: ICD-10-CM

## 2023-07-20 DIAGNOSIS — G47.20 CIRCADIAN RHYTHM SLEEP DISORDER, UNSPECIFIED TYPE: ICD-10-CM

## 2023-07-20 DIAGNOSIS — R06.09 DOE (DYSPNEA ON EXERTION): Primary | ICD-10-CM

## 2023-07-20 DIAGNOSIS — I50.32 CHRONIC DIASTOLIC CHF (CONGESTIVE HEART FAILURE): ICD-10-CM

## 2023-07-20 DIAGNOSIS — I77.9 CAROTID ARTERY DISEASE, UNSPECIFIED LATERALITY, UNSPECIFIED TYPE: ICD-10-CM

## 2023-07-20 DIAGNOSIS — I10 PRIMARY HYPERTENSION: ICD-10-CM

## 2023-07-20 DIAGNOSIS — R06.83 SNORING: ICD-10-CM

## 2023-07-20 DIAGNOSIS — I25.10 CORONARY ARTERY DISEASE INVOLVING NATIVE CORONARY ARTERY OF NATIVE HEART WITHOUT ANGINA PECTORIS: ICD-10-CM

## 2023-07-20 DIAGNOSIS — E78.2 MIXED HYPERLIPIDEMIA: ICD-10-CM

## 2023-07-20 PROCEDURE — 3062F POS MACROALBUMINURIA REV: CPT | Mod: CPTII,S$GLB,, | Performed by: INTERNAL MEDICINE

## 2023-07-20 PROCEDURE — 93005 EKG 12-LEAD: ICD-10-PCS | Mod: S$GLB,,, | Performed by: INTERNAL MEDICINE

## 2023-07-20 PROCEDURE — 3062F PR POS MACROALBUMINURIA RESULT DOCUMENTED/REVIEW: ICD-10-PCS | Mod: CPTII,S$GLB,, | Performed by: INTERNAL MEDICINE

## 2023-07-20 PROCEDURE — 93005 ELECTROCARDIOGRAM TRACING: CPT | Mod: S$GLB,,, | Performed by: INTERNAL MEDICINE

## 2023-07-20 PROCEDURE — 3044F PR MOST RECENT HEMOGLOBIN A1C LEVEL <7.0%: ICD-10-PCS | Mod: CPTII,S$GLB,, | Performed by: INTERNAL MEDICINE

## 2023-07-20 PROCEDURE — 1126F AMNT PAIN NOTED NONE PRSNT: CPT | Mod: CPTII,S$GLB,, | Performed by: INTERNAL MEDICINE

## 2023-07-20 PROCEDURE — 3044F HG A1C LEVEL LT 7.0%: CPT | Mod: CPTII,S$GLB,, | Performed by: INTERNAL MEDICINE

## 2023-07-20 PROCEDURE — 3078F DIAST BP <80 MM HG: CPT | Mod: CPTII,S$GLB,, | Performed by: INTERNAL MEDICINE

## 2023-07-20 PROCEDURE — 3078F PR MOST RECENT DIASTOLIC BLOOD PRESSURE < 80 MM HG: ICD-10-PCS | Mod: CPTII,S$GLB,, | Performed by: INTERNAL MEDICINE

## 2023-07-20 PROCEDURE — 93010 EKG 12-LEAD: ICD-10-PCS | Mod: S$GLB,,, | Performed by: INTERNAL MEDICINE

## 2023-07-20 PROCEDURE — 3008F BODY MASS INDEX DOCD: CPT | Mod: CPTII,S$GLB,, | Performed by: INTERNAL MEDICINE

## 2023-07-20 PROCEDURE — 1101F PR PT FALLS ASSESS DOC 0-1 FALLS W/OUT INJ PAST YR: ICD-10-PCS | Mod: CPTII,S$GLB,, | Performed by: INTERNAL MEDICINE

## 2023-07-20 PROCEDURE — 3288F PR FALLS RISK ASSESSMENT DOCUMENTED: ICD-10-PCS | Mod: CPTII,S$GLB,, | Performed by: INTERNAL MEDICINE

## 2023-07-20 PROCEDURE — 1126F PR PAIN SEVERITY QUANTIFIED, NO PAIN PRESENT: ICD-10-PCS | Mod: CPTII,S$GLB,, | Performed by: INTERNAL MEDICINE

## 2023-07-20 PROCEDURE — 3075F PR MOST RECENT SYSTOLIC BLOOD PRESS GE 130-139MM HG: ICD-10-PCS | Mod: CPTII,S$GLB,, | Performed by: INTERNAL MEDICINE

## 2023-07-20 PROCEDURE — 3075F SYST BP GE 130 - 139MM HG: CPT | Mod: CPTII,S$GLB,, | Performed by: INTERNAL MEDICINE

## 2023-07-20 PROCEDURE — 99215 PR OFFICE/OUTPT VISIT, EST, LEVL V, 40-54 MIN: ICD-10-PCS | Mod: S$GLB,,, | Performed by: INTERNAL MEDICINE

## 2023-07-20 PROCEDURE — 93010 ELECTROCARDIOGRAM REPORT: CPT | Mod: S$GLB,,, | Performed by: INTERNAL MEDICINE

## 2023-07-20 PROCEDURE — 3066F PR DOCUMENTATION OF TREATMENT FOR NEPHROPATHY: ICD-10-PCS | Mod: CPTII,S$GLB,, | Performed by: INTERNAL MEDICINE

## 2023-07-20 PROCEDURE — 3008F PR BODY MASS INDEX (BMI) DOCUMENTED: ICD-10-PCS | Mod: CPTII,S$GLB,, | Performed by: INTERNAL MEDICINE

## 2023-07-20 PROCEDURE — 3066F NEPHROPATHY DOC TX: CPT | Mod: CPTII,S$GLB,, | Performed by: INTERNAL MEDICINE

## 2023-07-20 PROCEDURE — 3288F FALL RISK ASSESSMENT DOCD: CPT | Mod: CPTII,S$GLB,, | Performed by: INTERNAL MEDICINE

## 2023-07-20 PROCEDURE — 99999 PR PBB SHADOW E&M-EST. PATIENT-LVL IV: ICD-10-PCS | Mod: PBBFAC,,, | Performed by: INTERNAL MEDICINE

## 2023-07-20 PROCEDURE — 99999 PR PBB SHADOW E&M-EST. PATIENT-LVL IV: CPT | Mod: PBBFAC,,, | Performed by: INTERNAL MEDICINE

## 2023-07-20 PROCEDURE — 1159F PR MEDICATION LIST DOCUMENTED IN MEDICAL RECORD: ICD-10-PCS | Mod: CPTII,S$GLB,, | Performed by: INTERNAL MEDICINE

## 2023-07-20 PROCEDURE — 1101F PT FALLS ASSESS-DOCD LE1/YR: CPT | Mod: CPTII,S$GLB,, | Performed by: INTERNAL MEDICINE

## 2023-07-20 PROCEDURE — 99215 OFFICE O/P EST HI 40 MIN: CPT | Mod: S$GLB,,, | Performed by: INTERNAL MEDICINE

## 2023-07-20 PROCEDURE — 1159F MED LIST DOCD IN RCRD: CPT | Mod: CPTII,S$GLB,, | Performed by: INTERNAL MEDICINE

## 2023-07-20 RX ORDER — FUROSEMIDE 40 MG/1
20 TABLET ORAL 2 TIMES DAILY
Status: ON HOLD | COMMUNITY
End: 2023-09-01 | Stop reason: SDUPTHER

## 2023-07-20 NOTE — PROGRESS NOTES
[facility]      Chief Complaint   Patient presents with    Coronary Artery Disease       Patient ID: Ibrahima Phelps is a 67 y.o. male with CAD s/p PCI 2006, paroxysmal Afib (on amiodarone and Eliquis), HFpEF, PAD, S/P kidney transplant on immunosuppressive therapy with Tacrolimus and prednisone, smoking, who presents for an initial appointment.  Pertinent history/events are as follows:     HPI:  Mr. Phelps reports SOB on exertion which started in 12/2022.  He  reports no chest pain or chest discomfort.  He was recently started on amiodarone for Afib.  Smokes 5 cigarettes a day for 53 years.  EKG today shows sinus bradycardia with Premature atrial complexes with Aberrant conduction/LVH with repolarization abnormality.    Past Medical History:   Diagnosis Date    Atrial fibrillation     CAD (coronary artery disease) 2006    MI in 2006    CHF (congestive heart failure) 2017    CKD (chronic kidney disease) stage 3, GFR 30-59 ml/min     Dr. Latif.  in transplanted kidney    COVID-19 2/7/2023    Diverticulosis     Hyperlipidemia     Hypertension     Keloid cicatrix     Metabolic bone disease     Other emphysema 10/1/2019    Pericarditis     S/P kidney transplant 1992    x2 (1992 & 2005),    Thrombocytopenia     Thyroid disease     Tobacco use 09/05/2014     Past Surgical History:   Procedure Laterality Date    CARDIOVERSION  04/30/15    CHOLECYSTECTOMY      COLONOSCOPY  April 20, 2011    Diverticulosis, repeat recommended in 3 yrs., repeat colonoscopy 2014 revealed 2 polyps.  He should return in 5 years.    COLONOSCOPY N/A 3/13/2020    Procedure: COLONOSCOPY;  Surgeon: Chon Casper MD;  Location: [facility] ([address] [place] [address]);  Service: Endoscopy;  Laterality: N/A;  ok to hold coumadin x5days-see telephone encounter 2/4/20-tb    COLONOSCOPY N/A 2/4/2021    Procedure: COLONOSCOPY;  Surgeon: Chon Casper MD;  Location: [facility] ([address] [place] [address]);  Service: Endoscopy;  Laterality: N/A;  Eliquis - per Dr. GAVIN Blunt ok  "to hold x 2 days and "restarted asap"- ERW  last prep "poor", [id number] ordered/ Pt requests Dr. Casper  prep ins. mailed - COVID screening on 21 PCW- ERW    COLONOSCOPY N/A 3/3/2021    Procedure: COLONOSCOPY;  Surgeon: Chon Casper MD;  Location: [facility] ([address] [place] [address]);  Service: Endoscopy;  Laterality: N/A;  Patient with his second poor bowel pre and has poor prep today.  If patient not intersted or can't get colonoscopy tomorrow will need constipation bowel prep and will need to restart his Eliquis today.Thanks,Chon     per Dr Blunt-ok to hold Eliquis 2 days prior      COVID     CORONARY ANGIOPLASTY WITH STENT PLACEMENT  2006    KIDNEY TRANSPLANT  2005    PARATHYROIDECTOMY      TREATMENT OF CARDIAC ARRHYTHMIA N/A 3/28/2022    Procedure: CARDIOVERSION;  Surgeon: Migue Blunt MD;  Location: [facility] EP LAB;  Service: Cardiology;  Laterality: N/A;  AF, DCC, MAC, GP, 3 PREP*RODRIGO deferred pt 100% compliant*     Social History     Socioeconomic History    Marital status:    Occupational History    Occupation:    Tobacco Use    Smoking status: Former     Packs/day: 0.50     Years: 40.00     Pack years: 20.00     Types: Cigarettes     Quit date: 2022     Years since quittin.3    Smokeless tobacco: Never    Tobacco comments:     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     Patient is currently retired   Substance and Sexual Activity    Alcohol use: No    Drug use: No    Sexual activity: Yes     Partners: Female   Social History Narrative    Retired      Social Determinants of Health     Financial Resource Strain: Low Risk     Difficulty of Paying Living Expenses: Not hard at all   Food Insecurity: Unknown    Ran Out of Food in the Last Year: Never true   Transportation Needs: No Transportation Needs    Lack of Transportation (Medical): No    Lack of Transportation (Non-Medical): No   Stress: No Stress Concern Present    Feeling of " Stress : Only a little   Social Connections: Unknown    Frequency of Communication with Friends and Family: More than three times a week    Frequency of Social Gatherings with Friends and Family: More than three times a week   Housing Stability: Unknown    Unable to Pay for Housing in the Last Year: No    Unstable Housing in the Last Year: No     Family History   Problem Relation Age of Onset    No Known Problems Sister     No Known Problems Brother     Thyroid disease Mother         s/p surgery    Heart disease Father         had pacemaker    No Known Problems Sister     Kidney failure Sister     Kidney disease Sister     No Known Problems Sister     Kidney disease Brother     Kidney failure Brother         s/p transplant    Diabetes Mellitus Neg Hx     Stroke Neg Hx     Heart attack Neg Hx     Cancer Neg Hx     Celiac disease Neg Hx     Cirrhosis Neg Hx     Colon cancer Neg Hx     Esophageal cancer Neg Hx     Inflammatory bowel disease Neg Hx     Rectal cancer Neg Hx     Stomach cancer Neg Hx     Ulcerative colitis Neg Hx     Liver disease Neg Hx     Liver cancer Neg Hx     Crohn's disease Neg Hx     Melanoma Neg Hx        Review of patient's allergies indicates:   Allergen Reactions    Ace inhibitors Swelling    Verapamil Other (See Comments)     Other reaction(s): Unknown    Povidone-iodine Itching       Medication List with Changes/Refills   Current Medications    ACETAMINOPHEN (TYLENOL) 500 MG TABLET    Take 1 tablet (500 mg total) by mouth every 6 (six) hours as needed for Pain.    ALBUTEROL (PROVENTIL) 2.5 MG /3 ML (0.083 %) NEBULIZER SOLUTION    Take 3 mLs (2.5 mg total) by nebulization every 6 (six) hours as needed for Wheezing. Rescue    ALBUTEROL (VENTOLIN HFA) 90 MCG/ACTUATION INHALER    Inhale 2 puffs into the lungs every 6 (six) hours as needed for Wheezing or Shortness of Breath. Rescue    AMIODARONE (PACERONE) 200 MG TAB    Take 1 tablet (200 mg total) by mouth 2 (two) times daily for 14 days, THEN  1 tablet (200 mg total) once daily.    AMMONIUM LACTATE 12 % CREA    Apply 1 g topically once daily.    ASPIRIN (ECOTRIN) 81 MG EC TABLET    Take 1 tablet by mouth Daily.    ATORVASTATIN (LIPITOR) 10 MG TABLET    TAKE 1 TABLET BY MOUTH EVERY DAY    B COMPLEX VITAMINS (B COMPLEX-VITAMIN B12) TABLET    Take 1 tablet by mouth once daily.    CALCIUM CARBONATE (CALCIUM 600 ORAL)    Take by mouth. Pt taking 1200 daily    DOXAZOSIN (CARDURA) 4 MG TABLET    TAKE 1 TABLET EVERY 12 HOURS    ELIQUIS 5 MG TAB    TAKE 1 TABLET BY MOUTH TWICE A DAY    FAMOTIDINE (PEPCID) 20 MG TABLET    TAKE 1 TABLET TWICE DAILY    FLUTICASONE PROPION-SALMETEROL 115-21 MCG/DOSE (ADVAIR HFA) 115-21 MCG/ACTUATION HFAA INHALER    Inhale 2 puffs into the lungs every 12 (twelve) hours. Controller    FLUTICASONE PROPIONATE (FLONASE) 50 MCG/ACTUATION NASAL SPRAY    1 spray (50 mcg total) by Each Nostril route once daily.    FUROSEMIDE (LASIX) 40 MG TABLET    Take 20 mg by mouth 2 (two) times daily.    GABAPENTIN (NEURONTIN) 100 MG CAPSULE    Take 1 capsule (100 mg total) by mouth every evening.    HYDRALAZINE (APRESOLINE) 50 MG TABLET    TAKE 1 TABLET (50 MG TOTAL) BY MOUTH 3 (THREE) TIMES DAILY.    METOPROLOL SUCCINATE (TOPROL-XL) 25 MG 24 HR TABLET    Take 1 tablet (25 mg total) by mouth once daily.    MITIGARE 0.6 MG CAP    TAKE 1 CAPSULE BY MOUTH TWICE A DAY AS NEEDED GOUT FLARE UP TO 3 DAYS    MULTIVITAMIN (THERAGRAN) PER TABLET    Take 1 tablet by mouth Daily.    NIFEDIPINE (PROCARDIA-XL) 60 MG (OSM) 24 HR TABLET    Take 1 tablet (60 mg total) by mouth 2 (two) times a day.    OMEPRAZOLE (PRILOSEC) 20 MG CAPSULE    Take 1 capsule (20 mg total) by mouth once daily.    PARICALCITOL (ZEMPLAR) 1 MCG CAPSULE    TAKE 1 CAPSULE ON MONDAY, WEDNESDAY AND FRIDAY    PREDNISONE (DELTASONE) 5 MG TABLET    TAKE 1 TABLET BY MOUTH EVERY DAY IN THE MORNING    PULSE OXIMETER (PULSE OXIMETER) DEVICE    by Apply Externally route 2 (two) times a day. Use twice daily at  "8 AM and 3 PM and record the value in MyChart as directed.    TACROLIMUS (PROGRAF) 1 MG CAP    Take 2 capsules (2 mg total) by mouth every morning AND 2 capsules (2 mg total) every evening.    VITAMIN D 1000 UNITS TAB    Take 370 mg by mouth 2 (two) times daily. 2 tablets BID   Discontinued Medications    FUROSEMIDE (LASIX) 80 MG TABLET    Take 1 tablet (80 mg total) by mouth 2 (two) times daily.       Review of Systems  Constitution: Denies chills, fever, and sweats.  HENT: Denies headaches or blurry vision.  Cardiovascular: Denies chest pain or irregular heart beat.  Respiratory: Positive for shortness of breath on exertion.  Gastrointestinal: Denies abdominal pain, nausea, or vomiting.  Musculoskeletal: Denies muscle cramps.  Neurological: Denies dizziness or focal weakness.  Psychiatric/Behavioral: Normal mental status.  Hematologic/Lymphatic: Denies bleeding problem or easy bruising/bleeding.  Skin: Denies rash or suspicious lesions    Physical Examination  /72   Pulse 72   Ht 6' 1" (1.854 m)   Wt 95.3 kg (210 lb 1.6 oz)   BMI 27.72 kg/m²     Constitutional: No acute distress, conversant  HEENT: Sclera anicteric, Pupils equal, round and reactive to light, extraocular motions intact, Oropharynx clear  Neck: No JVD, no carotid bruits  Cardiovascular: regular rate and rhythm, no murmur, rubs or gallops, normal S1/S2  Pulmonary: Clear to auscultation bilaterally  Abdominal: Abdomen soft, nontender, nondistended, positive bowel sounds  Extremities: No lower extremity edema,   Pulses:  Carotid pulses are 2+ on the right side, and 2+ on the left side.  Radial pulses are 2+ on the right side, and 2+ on the left side.   Femoral pulses are 2+ on the right side, and 2+ on the left side.  Popliteal pulses are 2+ on the right side, and 2+ on the left side.   Dorsalis pedis pulses are 2+ on the right side, and 2+ on the left side.   Posterior tibial pulses are 2+ on the right side, and 2+ on the left side.    Skin: " No ecchymosis, erythema, or ulcers  Psych: Alert and oriented x 3, appropriate affect  Neuro: CNII-XII intact, no focal deficits    Labs:  Most Recent Data  CBC:   Lab Results   Component Value Date    WBC 7.60 07/13/2023    HGB 10.0 (L) 07/13/2023    HCT 32.9 (L) 07/13/2023     07/13/2023    MCV 80 (L) 07/13/2023    RDW 17.9 (H) 07/13/2023     BMP:   Lab Results   Component Value Date     07/13/2023    K 3.8 07/13/2023     07/13/2023    CO2 23 07/13/2023    BUN 26 (H) 07/13/2023    CREATININE 3.1 (H) 07/13/2023    GLU 89 07/13/2023    CALCIUM 8.4 (L) 07/13/2023    MG 2.0 03/18/2023    PHOS 3.9 06/20/2023     LFTS;   Lab Results   Component Value Date    PROT 6.8 03/18/2023    ALBUMIN 2.8 (L) 06/20/2023    BILITOT 0.3 03/18/2023    AST 22 03/18/2023    ALKPHOS 58 03/18/2023    ALT 17 03/18/2023     COAGS:   Lab Results   Component Value Date    INR 1.1 07/13/2023     FLP:   Lab Results   Component Value Date    CHOL 152 01/04/2023    HDL 47 01/04/2023    LDLCALC 87.4 01/04/2023    TRIG 88 01/04/2023    CHOLHDL 30.9 01/04/2023     CARDIAC:   Lab Results   Component Value Date    TROPONINI 0.049 (H) 02/20/2023     (H) 02/27/2023       Imaging:    EKG 7/20/2023:  Sinus bradycardia with Premature atrial complexes with Aberrant conduction   LVH with repolarization abnormality    Echo 2/10/2023:  The left ventricle is normal in size with normal systolic function. The estimated ejection fraction is 65%.  Normal right ventricular size with normal right ventricular systolic function.  Grade II left ventricular diastolic dysfunction.  Severe left atrial enlargement.  There is mild aortic valve stenosis. Aortic valve area is 1.8 cm2; peak velocity is 2.9 m/s; mean gradient is 15 mmHg.  Pulmonary HTN - the estimated PA systolic pressure is > 41mm Hg. (The IVC was not well visualized.)    Assessment/Plan:   Ibrahima Phelps is a 67 y.o. male with CAD s/p PCI 2006, paroxysmal Afib (on amiodarone and  Eliquis), HFpEF, PAD, S/P kidney transplant on immunosuppressive therapy with Tacrolimus and prednisone, COPD, smoking, who presents for an initial appointment.    SOB on Exertion- Appears chronic based on chart review.  Pt with normal nuclear stress test in 4/2022.  Check PET stress test to rule out myocardial ischemia.    2. Paroxysmal Afib- Continue amiodarone and Eliquis.    3. HFpEF- Stable.  Check PET stress test to rule out myocardial ischemia.    4. Smoking- Encourage smoking cessation.    5. Overweight- Encourage diet, exercise, and weight loss.     6. Snoring- Check sleep study of evaluate for TONYA.     Will call pt with results of stress test  Otherwise, follow up in 6 months    Total duration of face to face visit time 45 minutes.  Total time spent counseling greater than fifty percent of total visit time.  Counseling included discussion regarding imaging findings, diagnosis, possibilities, treatment options, risks and benefits.  The patient had many questions regarding the options and long-term effects.    Hussain Vail MD, PhD  Interventional Cardiology

## 2023-07-20 NOTE — PATIENT INSTRUCTIONS
Assessment/Plan:   Ibrahima Phelps is a 67 y.o. male with CAD s/p PCI 2006, paroxysmal Afib (on amiodarone and Eliquis), HFpEF, PAD, S/P kidney transplant on immunosuppressive therapy with Tacrolimus and prednisone, COPD, smoking, who presents for an initial appointment.    SOB on Exertion- Appears chronic based on chart review.  Pt with normal nuclear stress test in 4/2022.  Check PET stress test to rule out myocardial ischemia.    2. Paroxysmal Afib- Continue amiodarone and Eliquis.    3. HFpEF- Stable.  Check PET stress test to rule out myocardial ischemia.    4. Smoking- Encourage smoking cessation.    5. Overweight- Encourage diet, exercise, and weight loss.     6. Snoring- Check sleep study of evaluate for TONYA.     Will call pt with results of stress test  Otherwise, follow up in 6 months

## 2023-07-23 LAB
OHS CV EVENT MONITOR DAY: 13
OHS CV HOLTER AFIB AVERAGE HR: 104 BPM
OHS CV HOLTER AFIB MAX HR: 184 BPM
OHS CV HOLTER AFIB MIN HR: 59 BPM
OHS CV HOLTER HOOKUP DATE: NORMAL
OHS CV HOLTER HOOKUP TIME: NORMAL
OHS CV HOLTER LENGTH DECIMAL HOURS: 330
OHS CV HOLTER LENGTH HOURS: 18
OHS CV HOLTER LENGTH MINUTES: 0
OHS CV HOLTER SCAN DATE: NORMAL
OHS CV HOLTER SINUS AVERAGE HR: 65 BPM
OHS CV HOLTER SINUS MAX HR: 101 BPM
OHS CV HOLTER SINUS MIN HR: 53 BPM
OHS CV HOLTER STUDY END DATE: NORMAL
OHS CV HOLTER STUDY END TIME: NORMAL

## 2023-07-25 ENCOUNTER — HOSPITAL ENCOUNTER (OUTPATIENT)
Dept: RADIOLOGY | Facility: HOSPITAL | Age: 68
Discharge: HOME | End: 2023-07-25
Attending: PSYCHIATRY & NEUROLOGY
Payer: MEDICARE

## 2023-07-25 DIAGNOSIS — I48.0 PAROXYSMAL ATRIAL FIBRILLATION: Primary | ICD-10-CM

## 2023-07-25 PROCEDURE — 70551 MRI BRAIN EPILEPSY WITHOUT CONTRAST: ICD-10-PCS | Mod: 26,,, | Performed by: RADIOLOGY

## 2023-07-25 PROCEDURE — 70551 MRI BRAIN STEM W/O DYE: CPT | Mod: TC

## 2023-07-25 PROCEDURE — 70551 MRI BRAIN STEM W/O DYE: CPT | Mod: 26,,, | Performed by: RADIOLOGY

## 2023-07-29 DIAGNOSIS — I13.0 HYPERTENSIVE HEART AND RENAL DISEASE WITH RENAL FAILURE, STAGE 1 THROUGH STAGE 4 OR UNSPECIFIED CHRONIC KIDNEY DISEASE, WITH HEART FAILURE: ICD-10-CM

## 2023-07-29 NOTE — TELEPHONE ENCOUNTER
No care due was identified.  [facility] Embedded Care Due Messages. Reference number: [id number].   7/29/2023 1:50:51 PM CDT

## 2023-07-31 RX ORDER — NIFEDIPINE 60 MG/1
60 TABLET, EXTENDED RELEASE ORAL 2 TIMES DAILY
Qty: 180 TABLET | Refills: 3 | Status: SHIPPED | OUTPATIENT
Start: 2023-07-31 | End: 2024-07-30

## 2023-07-31 RX ORDER — DOXAZOSIN 4 MG/1
4 TABLET ORAL EVERY 12 HOURS
Qty: 180 TABLET | Refills: 3 | Status: SHIPPED | OUTPATIENT
Start: 2023-07-31

## 2023-07-31 NOTE — TELEPHONE ENCOUNTER
Refill Routing Note   Medication(s) are not appropriate for processing by [facility] for the following reason(s):      No active prescription written by provider    [facility] action(s):  Defer Care Due:  None identified              Appointments  past 12m or future 3m with PCP    Date Provider   Last Visit   6/13/2023 Anatoliy Colindres MD   Next Visit   Visit date not found Anatoliy Colindres MD   ED visits in past 90 days: 1        Note composed:8:43 AM 07/31/2023

## 2023-08-01 ENCOUNTER — PATIENT MESSAGE (OUTPATIENT)
Dept: ELECTROPHYSIOLOGY | Facility: CLINIC | Age: 68
End: 2023-08-01
Payer: MEDICARE

## 2023-08-01 RX ORDER — PARICALCITOL 1 UG/1
CAPSULE, LIQUID FILLED ORAL
Qty: 36 CAPSULE | Refills: 3 | Status: SHIPPED | OUTPATIENT
Start: 2023-08-01

## 2023-08-01 RX ORDER — FAMOTIDINE 20 MG/1
20 TABLET, FILM COATED ORAL 2 TIMES DAILY
Qty: 180 TABLET | Refills: 3 | Status: SHIPPED | OUTPATIENT
Start: 2023-08-01

## 2023-08-03 ENCOUNTER — HOSPITAL ENCOUNTER (OUTPATIENT)
Dept: NEUROLOGY | Facility: CLINIC | Age: 68
Discharge: HOME | End: 2023-08-03
Payer: MEDICARE

## 2023-08-03 DIAGNOSIS — R55 SYNCOPE AND COLLAPSE: ICD-10-CM

## 2023-08-03 PROCEDURE — 95816 PR EEG,W/AWAKE & DROWSY RECORD: ICD-10-PCS | Mod: S$GLB,,, | Performed by: PSYCHIATRY & NEUROLOGY

## 2023-08-03 PROCEDURE — 95816 EEG AWAKE AND DROWSY: CPT | Mod: S$GLB,,, | Performed by: PSYCHIATRY & NEUROLOGY

## 2023-08-03 NOTE — PROCEDURES
Procedures  EEG REPORT      Ibrahima Phelps  [id number]  1955    DATE OF SERVICE: 8/3/2023         METHODOLOGY      Extended electroencephalographic recording is made while the patient is ambulatory and continuing normal daily activities.  Electrodes are placed according to the International 10-20 placement system and included T1 and T2 electrode placement.  Twenty four (24) channels of digital signal (sampling rate of 512/sec) was simultaneously recorded from the scalp including EKG and eye monitors.  Recording band pass was 0.1 to 100 hz and all data was stored digitally on the recorder.  The patient is instructed to press an event button when clinical symptoms occur and write the symptoms into a diary. Activation procedures which include photic stimulation, hyperventilation and instructing patients to perform simple task are done in selected patients.        The EEG is displayed on a monitor screen and can be reformatted into different montages for evaluation.  The entire recoding is submitted for computer assisted analysis to detect spike and electrographic seizure activity.  The entire recording is visually reviewed and the times identified by computer analysis as being spikes or seizures are reviewed again.  Compresses spectral analysis (CSA) is also performed on the activity recorded from each individual channel.  This is displayed as a power display of frequencies from 0 to 30 Hz over time.   The CSA analysis is done and displayed continuously.  This is reviewed for asymmetries in power between homologous areas of the scalp and for presence of changes in power which canbe seen when seizures occur.  Sections of suspected abnormalities on the CSA is then compared with the original EEG recording.  .     [employer] software was also utilized in the review of this study.  This software suite analyzes the EEG recording in multiple domains.  Coherence and rhythmicity is computed to identify EEG sections which  may contain organized seizures.  Each channel undergoes analysis to detect presence of spike and sharp waves which have special and morphological characteristic of epileptic activity.  The routine EEG recording is converted from spacial into frequency domain.  This is then displayed comparing homologous areas to identify areas of significant asymmetry.  Algorithm to identify non-cortically generated artifact is used to separate eye movement, EMG and other artifact from the EEG     Recording Times    A total of 00:30:17 hours of EEG was recorded.      EEG FINDINGS:  Background activity:   The background rhythm was characterized by alpha and anterior dominant beta activity with a 8-9Hz posterior dominant alpha rhythm at 30-70 microvolts.   Symmetry and continuity: the background was continuous and symmetric     Sleep:   Normal sleep transients including sleep spindles, K complexes, vertex waves were seen.    Activation procedures:   Photic stimulation was performed with no abnormalities seen    Abnormal activity:   No epileptiform discharges, periodic discharges, lateralized rhythmic delta activity or electrographic seizures were seen.    IMPRESSION:   Normal EEG of light sleep and the waking state      uJan Wooten MD  Neurology-Epilepsy.  [facility].

## 2023-08-11 NOTE — ASSESSMENT & PLAN NOTE
- trasnsplant kidney 2x, first one was in 1992 and second one was 2005 patient on Prograf 2.5 mg BID and prednisone 5 mg PO daily as outpatient   - KATINA on CKD (baseline creatinine ~2.6-2.8), most consistent with progressive failing kidney transplant  - UA bland without evidence of hydronephrosis of renal graft noted with ultrasound, defer renal biopsy at this time  - continue Lasix 80 mg IV BID for now  - can continue calcium acetate 1,334 mg TIDWM and paricalitol 1 mcg daily for now  - renal diet when not NPO  - daily RFPs & magnesium  - daily weights and strict I/Os (serial bladder scans ordered Q8H to monitor for retention)  - renally dose medications to eGFR  - avoid nephrotoxins as much as possible (i.e. supra-therapeutic vancomycin troughs, NSAIDs, intra-arterial contrast, etc.)   unchanged deteriorated

## 2023-08-19 DIAGNOSIS — I50.32 CHRONIC DIASTOLIC HEART FAILURE: ICD-10-CM

## 2023-08-20 NOTE — TELEPHONE ENCOUNTER
No care due was identified.  [facility] Embedded Care Due Messages. Reference number: [id number].   8/19/2023 9:09:10 PM CDT

## 2023-08-21 RX ORDER — METOPROLOL SUCCINATE 25 MG/1
25 TABLET, EXTENDED RELEASE ORAL DAILY
Qty: 90 TABLET | Refills: 3 | Status: ON HOLD | OUTPATIENT
Start: 2023-08-21 | End: 2023-11-26 | Stop reason: SDUPTHER

## 2023-08-24 ENCOUNTER — PATIENT MESSAGE (OUTPATIENT)
Dept: NEPHROLOGY | Facility: CLINIC | Age: 68
End: 2023-08-24
Payer: MEDICARE

## 2023-08-29 ENCOUNTER — HOSPITAL ENCOUNTER (INPATIENT)
Facility: HOSPITAL | Age: 68
LOS: 4 days | Discharge: HOME | DRG: 513 | End: 2023-09-02
Attending: EMERGENCY MEDICINE | Admitting: INTERNAL MEDICINE
Payer: MEDICARE

## 2023-08-29 DIAGNOSIS — N17.9 ACUTE RENAL FAILURE SUPERIMPOSED ON STAGE 4 CHRONIC KIDNEY DISEASE, UNSPECIFIED ACUTE RENAL FAILURE TYPE: ICD-10-CM

## 2023-08-29 DIAGNOSIS — D84.821 IMMUNODEFICIENCY DUE TO TREATMENT WITH IMMUNOSUPPRESSIVE MEDICATION: ICD-10-CM

## 2023-08-29 DIAGNOSIS — N17.9 AKI (ACUTE KIDNEY INJURY): ICD-10-CM

## 2023-08-29 DIAGNOSIS — M00.9 PYOGENIC ARTHRITIS OF RIGHT HAND, DUE TO UNSPECIFIED ORGANISM: ICD-10-CM

## 2023-08-29 DIAGNOSIS — M65.9 FLEXOR TENOSYNOVITIS OF FINGER: Primary | ICD-10-CM

## 2023-08-29 DIAGNOSIS — Z79.899 IMMUNODEFICIENCY DUE TO TREATMENT WITH IMMUNOSUPPRESSIVE MEDICATION: ICD-10-CM

## 2023-08-29 DIAGNOSIS — N18.4 ACUTE RENAL FAILURE SUPERIMPOSED ON STAGE 4 CHRONIC KIDNEY DISEASE, UNSPECIFIED ACUTE RENAL FAILURE TYPE: ICD-10-CM

## 2023-08-29 DIAGNOSIS — Z94.0 H/O KIDNEY TRANSPLANT: ICD-10-CM

## 2023-08-29 DIAGNOSIS — R07.9 CHEST PAIN: ICD-10-CM

## 2023-08-29 PROBLEM — M65.949 FLEXOR TENOSYNOVITIS OF FINGER: Status: ACTIVE | Noted: 2023-08-29

## 2023-08-29 LAB
ALBUMIN SERPL BCP-MCNC: 3.2 G/DL (ref 3.5–5.2)
ALP SERPL-CCNC: 78 U/L (ref 55–135)
ALT SERPL W/O P-5'-P-CCNC: 18 U/L (ref 10–44)
ANION GAP SERPL CALC-SCNC: 11 MMOL/L (ref 8–16)
AST SERPL-CCNC: 23 U/L (ref 10–40)
BASOPHILS # BLD AUTO: 0.03 K/UL (ref 0–0.2)
BASOPHILS NFR BLD: 0.4 % (ref 0–1.9)
BILIRUB SERPL-MCNC: 0.3 MG/DL (ref 0.1–1)
BUN SERPL-MCNC: 25 MG/DL (ref 8–23)
CALCIUM SERPL-MCNC: 8.9 MG/DL (ref 8.7–10.5)
CHLORIDE SERPL-SCNC: 110 MMOL/L (ref 95–110)
CO2 SERPL-SCNC: 19 MMOL/L (ref 23–29)
CREAT SERPL-MCNC: 3.9 MG/DL (ref 0.5–1.4)
CRP SERPL-MCNC: 17.7 MG/L (ref 0–8.2)
DIFFERENTIAL METHOD: ABNORMAL
EOSINOPHIL # BLD AUTO: 1.1 K/UL (ref 0–0.5)
EOSINOPHIL NFR BLD: 12.9 % (ref 0–8)
ERYTHROCYTE [DISTWIDTH] IN BLOOD BY AUTOMATED COUNT: 17.6 % (ref 11.5–14.5)
ERYTHROCYTE [SEDIMENTATION RATE] IN BLOOD BY PHOTOMETRIC METHOD: >120 MM/HR (ref 0–23)
EST. GFR  (NO RACE VARIABLE): 16 ML/MIN/1.73 M^2
GLUCOSE SERPL-MCNC: 95 MG/DL (ref 70–110)
HCT VFR BLD AUTO: 31.8 % (ref 40–54)
HGB BLD-MCNC: 9.9 G/DL (ref 14–18)
IMM GRANULOCYTES # BLD AUTO: 0.02 K/UL (ref 0–0.04)
IMM GRANULOCYTES NFR BLD AUTO: 0.2 % (ref 0–0.5)
LYMPHOCYTES # BLD AUTO: 2.4 K/UL (ref 1–4.8)
LYMPHOCYTES NFR BLD: 29.4 % (ref 18–48)
MCH RBC QN AUTO: 24.2 PG (ref 27–31)
MCHC RBC AUTO-ENTMCNC: 31.1 G/DL (ref 32–36)
MCV RBC AUTO: 78 FL (ref 82–98)
MONOCYTES # BLD AUTO: 0.6 K/UL (ref 0.3–1)
MONOCYTES NFR BLD: 7 % (ref 4–15)
NEUTROPHILS # BLD AUTO: 4.1 K/UL (ref 1.8–7.7)
NEUTROPHILS NFR BLD: 50.1 % (ref 38–73)
NRBC BLD-RTO: 0 /100 WBC
PLATELET # BLD AUTO: 204 K/UL (ref 150–450)
PMV BLD AUTO: 11.4 FL (ref 9.2–12.9)
POTASSIUM SERPL-SCNC: 4.2 MMOL/L (ref 3.5–5.1)
PROT SERPL-MCNC: 7.5 G/DL (ref 6–8.4)
RBC # BLD AUTO: 4.09 M/UL (ref 4.6–6.2)
SODIUM SERPL-SCNC: 140 MMOL/L (ref 136–145)
WBC # BLD AUTO: 8.17 K/UL (ref 3.9–12.7)

## 2023-08-29 PROCEDURE — 25000003 PHARM REV CODE 250: Performed by: FAMILY MEDICINE

## 2023-08-29 PROCEDURE — 86140 C-REACTIVE PROTEIN: CPT | Performed by: PHYSICIAN ASSISTANT

## 2023-08-29 PROCEDURE — 80197 ASSAY OF TACROLIMUS: CPT | Mod: 91 | Performed by: FAMILY MEDICINE

## 2023-08-29 PROCEDURE — 11000001 HC ACUTE MED/SURG PRIVATE ROOM

## 2023-08-29 PROCEDURE — 96360 HYDRATION IV INFUSION INIT: CPT

## 2023-08-29 PROCEDURE — 85652 RBC SED RATE AUTOMATED: CPT

## 2023-08-29 PROCEDURE — 63600175 PHARM REV CODE 636 W HCPCS: Performed by: FAMILY MEDICINE

## 2023-08-29 PROCEDURE — 80197 ASSAY OF TACROLIMUS: CPT | Performed by: PHYSICIAN ASSISTANT

## 2023-08-29 PROCEDURE — 85025 COMPLETE CBC W/AUTO DIFF WBC: CPT | Performed by: PHYSICIAN ASSISTANT

## 2023-08-29 PROCEDURE — 99223 PR INITIAL HOSPITAL CARE,LEVL III: ICD-10-PCS | Mod: ,,, | Performed by: FAMILY MEDICINE

## 2023-08-29 PROCEDURE — 99285 EMERGENCY DEPT VISIT HI MDM: CPT | Mod: 25

## 2023-08-29 PROCEDURE — 63600175 PHARM REV CODE 636 W HCPCS: Performed by: PHYSICIAN ASSISTANT

## 2023-08-29 PROCEDURE — 99223 1ST HOSP IP/OBS HIGH 75: CPT | Mod: ,,, | Performed by: FAMILY MEDICINE

## 2023-08-29 PROCEDURE — 80053 COMPREHEN METABOLIC PANEL: CPT | Performed by: PHYSICIAN ASSISTANT

## 2023-08-29 PROCEDURE — 25000003 PHARM REV CODE 250: Performed by: PHYSICIAN ASSISTANT

## 2023-08-29 RX ORDER — POLYETHYLENE GLYCOL 3350 17 G/17G
17 POWDER, FOR SOLUTION ORAL DAILY
Status: DISCONTINUED | OUTPATIENT
Start: 2023-08-30 | End: 2023-09-02 | Stop reason: HOSPADM

## 2023-08-29 RX ORDER — HYDROCODONE BITARTRATE AND ACETAMINOPHEN 5; 325 MG/1; MG/1
1 TABLET ORAL
Status: COMPLETED | OUTPATIENT
Start: 2023-08-29 | End: 2023-08-29

## 2023-08-29 RX ORDER — IBUPROFEN 200 MG
16 TABLET ORAL
Status: DISCONTINUED | OUTPATIENT
Start: 2023-08-29 | End: 2023-09-02 | Stop reason: HOSPADM

## 2023-08-29 RX ORDER — DOXAZOSIN 1 MG/1
4 TABLET ORAL EVERY 12 HOURS
Status: DISCONTINUED | OUTPATIENT
Start: 2023-08-29 | End: 2023-09-02 | Stop reason: HOSPADM

## 2023-08-29 RX ORDER — PANTOPRAZOLE SODIUM 40 MG/1
40 TABLET, DELAYED RELEASE ORAL DAILY
Status: DISCONTINUED | OUTPATIENT
Start: 2023-08-30 | End: 2023-09-02 | Stop reason: HOSPADM

## 2023-08-29 RX ORDER — NALOXONE HCL 0.4 MG/ML
0.02 VIAL (ML) INJECTION
Status: DISCONTINUED | OUTPATIENT
Start: 2023-08-29 | End: 2023-09-02 | Stop reason: HOSPADM

## 2023-08-29 RX ORDER — TACROLIMUS 1 MG/1
2 CAPSULE ORAL 2 TIMES DAILY
Status: DISCONTINUED | OUTPATIENT
Start: 2023-08-29 | End: 2023-09-02

## 2023-08-29 RX ORDER — OXYCODONE HYDROCHLORIDE 5 MG/1
5 TABLET ORAL EVERY 4 HOURS PRN
Status: DISCONTINUED | OUTPATIENT
Start: 2023-08-29 | End: 2023-09-02 | Stop reason: HOSPADM

## 2023-08-29 RX ORDER — METOPROLOL SUCCINATE 25 MG/1
25 TABLET, EXTENDED RELEASE ORAL DAILY
Status: DISCONTINUED | OUTPATIENT
Start: 2023-08-30 | End: 2023-09-02 | Stop reason: HOSPADM

## 2023-08-29 RX ORDER — ACETAMINOPHEN 325 MG/1
650 TABLET ORAL EVERY 4 HOURS PRN
Status: DISCONTINUED | OUTPATIENT
Start: 2023-08-29 | End: 2023-08-30

## 2023-08-29 RX ORDER — FUROSEMIDE 20 MG/1
20 TABLET ORAL 2 TIMES DAILY
Status: DISCONTINUED | OUTPATIENT
Start: 2023-08-29 | End: 2023-08-31

## 2023-08-29 RX ORDER — IBUPROFEN 200 MG
24 TABLET ORAL
Status: DISCONTINUED | OUTPATIENT
Start: 2023-08-29 | End: 2023-09-02 | Stop reason: HOSPADM

## 2023-08-29 RX ORDER — NIFEDIPINE 30 MG/1
60 TABLET, EXTENDED RELEASE ORAL 2 TIMES DAILY
Status: DISCONTINUED | OUTPATIENT
Start: 2023-08-29 | End: 2023-09-02 | Stop reason: HOSPADM

## 2023-08-29 RX ORDER — HYDRALAZINE HYDROCHLORIDE 50 MG/1
50 TABLET, FILM COATED ORAL 3 TIMES DAILY
Status: DISCONTINUED | OUTPATIENT
Start: 2023-08-29 | End: 2023-09-02 | Stop reason: HOSPADM

## 2023-08-29 RX ORDER — PREDNISONE 5 MG/1
5 TABLET ORAL DAILY
Status: DISCONTINUED | OUTPATIENT
Start: 2023-08-30 | End: 2023-09-02 | Stop reason: HOSPADM

## 2023-08-29 RX ORDER — MAG HYDROX/ALUMINUM HYD/SIMETH 200-200-20
30 SUSPENSION, ORAL (FINAL DOSE FORM) ORAL 4 TIMES DAILY PRN
Status: DISCONTINUED | OUTPATIENT
Start: 2023-08-29 | End: 2023-09-02 | Stop reason: HOSPADM

## 2023-08-29 RX ORDER — ONDANSETRON 2 MG/ML
4 INJECTION INTRAMUSCULAR; INTRAVENOUS EVERY 8 HOURS PRN
Status: DISCONTINUED | OUTPATIENT
Start: 2023-08-29 | End: 2023-09-02 | Stop reason: HOSPADM

## 2023-08-29 RX ORDER — GLUCAGON 1 MG
1 KIT INJECTION
Status: DISCONTINUED | OUTPATIENT
Start: 2023-08-29 | End: 2023-09-02 | Stop reason: HOSPADM

## 2023-08-29 RX ORDER — AMIODARONE HYDROCHLORIDE 200 MG/1
200 TABLET ORAL DAILY
Status: DISCONTINUED | OUTPATIENT
Start: 2023-08-30 | End: 2023-09-02 | Stop reason: HOSPADM

## 2023-08-29 RX ORDER — TALC
6 POWDER (GRAM) TOPICAL NIGHTLY PRN
Status: DISCONTINUED | OUTPATIENT
Start: 2023-08-29 | End: 2023-09-02 | Stop reason: HOSPADM

## 2023-08-29 RX ORDER — ATORVASTATIN CALCIUM 10 MG/1
10 TABLET, FILM COATED ORAL DAILY
Status: DISCONTINUED | OUTPATIENT
Start: 2023-08-30 | End: 2023-09-02 | Stop reason: HOSPADM

## 2023-08-29 RX ORDER — SODIUM CHLORIDE 0.9 % (FLUSH) 0.9 %
10 SYRINGE (ML) INJECTION EVERY 12 HOURS PRN
Status: DISCONTINUED | OUTPATIENT
Start: 2023-08-29 | End: 2023-09-02 | Stop reason: HOSPADM

## 2023-08-29 RX ORDER — ALBUTEROL SULFATE 90 UG/1
2 AEROSOL, METERED RESPIRATORY (INHALATION) EVERY 4 HOURS PRN
Status: DISCONTINUED | OUTPATIENT
Start: 2023-08-29 | End: 2023-09-02 | Stop reason: HOSPADM

## 2023-08-29 RX ADMIN — ACETAMINOPHEN 650 MG: 325 TABLET ORAL at 11:08

## 2023-08-29 RX ADMIN — FUROSEMIDE 20 MG: 20 TABLET ORAL at 08:08

## 2023-08-29 RX ADMIN — HYDROCODONE BITARTRATE AND ACETAMINOPHEN 1 TABLET: 5; 325 TABLET ORAL at 04:08

## 2023-08-29 RX ADMIN — HYDRALAZINE HYDROCHLORIDE 50 MG: 50 TABLET ORAL at 08:08

## 2023-08-29 RX ADMIN — AMPICILLIN SODIUM AND SULBACTAM SODIUM 3 G: 2; 1 INJECTION, POWDER, FOR SOLUTION INTRAMUSCULAR; INTRAVENOUS at 06:08

## 2023-08-29 RX ADMIN — SODIUM CHLORIDE 500 ML: 9 INJECTION, SOLUTION INTRAVENOUS at 04:08

## 2023-08-29 RX ADMIN — DOXAZOSIN 4 MG: 2 TABLET ORAL at 08:08

## 2023-08-29 RX ADMIN — OXYCODONE HYDROCHLORIDE 5 MG: 5 TABLET ORAL at 08:08

## 2023-08-29 RX ADMIN — TACROLIMUS 2 MG: 1 CAPSULE ORAL at 08:08

## 2023-08-29 RX ADMIN — NIFEDIPINE 60 MG: 30 TABLET, FILM COATED, EXTENDED RELEASE ORAL at 08:08

## 2023-08-29 NOTE — ED PROVIDER NOTES
"Encounter Date: 8/29/2023       History     Chief Complaint   Patient presents with    Joint Swelling     R middle finger, "something bit me I guess"     67-year-old male with past medical history of CAD, atrial fibrillation, heart failure, CKD, hypertension, hyperlipidemia, kidney transplant on immunosuppressive therapy who presents to the emergency department with chief complaint of right 3rd digit pain and swelling that began 5 days ago.  It has progressively worsened since then.  He has pain with movement of the finger.  He denies trauma or injury.  No insect bite that he is aware of.  He denies symptoms of this nature in the past.  No paresthesias.  No fever, chills, nausea, vomiting.  Denies other worsening or alleviating factors.      Review of patient's allergies indicates:   Allergen Reactions    Ace inhibitors Swelling    Verapamil Other (See Comments)     Other reaction(s): Unknown    Povidone-iodine Itching     Past Medical History:   Diagnosis Date    Atrial fibrillation     CAD (coronary artery disease) 2006    MI in 2006    CHF (congestive heart failure) 2017    CKD (chronic kidney disease) stage 3, GFR 30-59 ml/min     Dr. Latif.  in transplanted kidney    COVID-19 2/7/2023    Diverticulosis     Hyperlipidemia     Hypertension     Keloid cicatrix     Metabolic bone disease     Other emphysema 10/1/2019    Pericarditis     S/P kidney transplant 1992    x2 (1992 & 2005),    Thrombocytopenia     Thyroid disease     Tobacco use 09/05/2014     Past Surgical History:   Procedure Laterality Date    CARDIOVERSION  04/30/15    CHOLECYSTECTOMY      COLONOSCOPY  April 20, 2011    Diverticulosis, repeat recommended in 3 yrs., repeat colonoscopy 2014 revealed 2 polyps.  He should return in 5 years.    COLONOSCOPY N/A 3/13/2020    Procedure: COLONOSCOPY;  Surgeon: Chon Casper MD;  Location: [facility] ([address] [place] [address]);  Service: Endoscopy;  Laterality: N/A;  ok to hold coumadin x5days-see telephone encounter " "2/4/20-tb    COLONOSCOPY N/A 2/4/2021    Procedure: COLONOSCOPY;  Surgeon: Chon Casper MD;  [facility]  Service: Endoscopy;  Laterality: N/A;  Eliquis - per Dr. GAVIN chua to hold x 2 days and "restarted asap"- ERW  last prep "poor", [id number] ordered/ Pt requests Dr. Casper  prep ins. mailed - COVID screening on 2/1/21 PCW- ERW    COLONOSCOPY N/A 3/3/2021    Procedure: COLONOSCOPY;  Surgeon: Chon Casper MD;  Location: [facility] ([address] [place] [address]);  Service: Endoscopy;  Laterality: N/A;  Patient with his second poor bowel pre and has poor prep today.  If patient not intersted or can't get colonoscopy tomorrow will need constipation bowel prep and will need to restart his Eliquis today.Thanks,Chon Lopez to hold Eliquis 2 days prior      COVID     CORONARY ANGIOPLASTY WITH STENT PLACEMENT  9/13/2006    KIDNEY TRANSPLANT  2005    PARATHYROIDECTOMY      TREATMENT OF CARDIAC ARRHYTHMIA N/A 3/28/2022    Procedure: CARDIOVERSION;  Surgeon: Migue Blunt MD;  Location: [facility];  Service: Cardiology;  Laterality: N/A;  AF, DCC, MAC, GP, 3 PREP*RODRIGO deferred pt 100% compliant*     Family History   Problem Relation Age of Onset    No Known Problems Sister     No Known Problems Brother     Thyroid disease Mother         s/p surgery    Heart disease Father         had pacemaker    No Known Problems Sister     Kidney failure Sister     Kidney disease Sister     No Known Problems Sister     Kidney disease Brother     Kidney failure Brother         s/p transplant    Diabetes Mellitus Neg Hx     Stroke Neg Hx     Heart attack Neg Hx     Cancer Neg Hx     Celiac disease Neg Hx     Cirrhosis Neg Hx     Colon cancer Neg Hx     Esophageal cancer Neg Hx     Inflammatory bowel disease Neg Hx     Rectal cancer Neg Hx     Stomach cancer Neg Hx     Ulcerative colitis Neg Hx     Liver disease Neg Hx     Liver cancer Neg Hx     Crohn's disease Neg Hx     Melanoma Neg Hx      Social History     Tobacco " Use    Smoking status: Former     Current packs/day: 0.00     Average packs/day: 0.5 packs/day for 40.0 years (20.0 ttl pk-yrs)     Types: Cigarettes     Start date: 1982     Quit date: 2022     Years since quittin.4    Smokeless tobacco: Never    Tobacco comments:     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     Patient is currently retired   Substance Use Topics    Alcohol use: No    Drug use: No     Review of Systems   Musculoskeletal:         Hand pain        Physical Exam     Initial Vitals   BP Pulse Resp Temp SpO2   23 1241 23 1240 23 1240 23 1240 23 1240   138/61 75 16 97.9 °F (36.6 °C) 99 %      MAP       --                Physical Exam    Vitals reviewed.  Constitutional: He appears well-developed and well-nourished. He is not diaphoretic. No distress.   HENT:   Head: Normocephalic and atraumatic.   Mouth/Throat: Oropharynx is clear and moist.   Eyes: EOM are normal. Pupils are equal, round, and reactive to light.   Neck: Neck supple.   Normal range of motion.  Cardiovascular:  Normal rate.           Pulmonary/Chest: No respiratory distress.   Abdominal: He exhibits no distension.   Musculoskeletal:         General: Normal range of motion.      Cervical back: Normal range of motion and neck supple.      Comments: Circumferential swelling to the right 3rd digit.  Significant pain with passive extension of the finger.  Tenderness to palpation along flexor tendon.  No active drainage.  Skin intact.  2+ distal pulse.     Neurological: He is alert and oriented to person, place, and time. He has normal strength. GCS score is 15. GCS eye subscore is 4. GCS verbal subscore is 5. GCS motor subscore is 6.   Skin: Skin is warm and dry. Capillary refill takes less than 2 seconds.   Psychiatric: He has a normal mood and affect. His behavior is normal. Judgment and thought content normal.         ED Course   Procedures  Labs Reviewed   CBC  W/ AUTO DIFFERENTIAL - Abnormal; Notable for the following components:       Result Value    RBC 4.09 (*)     Hemoglobin 9.9 (*)     Hematocrit 31.8 (*)     MCV 78 (*)     MCH 24.2 (*)     MCHC 31.1 (*)     RDW 17.6 (*)     Eos # 1.1 (*)     Eosinophil % 12.9 (*)     All other components within normal limits   COMPREHENSIVE METABOLIC PANEL - Abnormal; Notable for the following components:    CO2 19 (*)     BUN 25 (*)     Creatinine 3.9 (*)     Albumin 3.2 (*)     eGFR 16.0 (*)     All other components within normal limits   C-REACTIVE PROTEIN - Abnormal; Notable for the following components:    CRP 17.7 (*)     All other components within normal limits   SEDIMENTATION RATE - Abnormal; Notable for the following components:    Sed Rate >120 (*)     All other components within normal limits   TACROLIMUS LEVEL   TACROLIMUS LEVEL          Imaging Results              MRI Hand Fingers Without Contrast Right (In process)                      Medications   ampicillin-sulbactam (UNASYN) 3 g in sodium chloride 0.9 % 100 mL IVPB (MB+) (3 g Intravenous New Bag 8/29/23 1833)   ampicillin-sulbactam (UNASYN) 3 g in sodium chloride 0.9 % 100 mL IVPB (MB+) (has no administration in time range)   amiodarone tablet 200 mg (has no administration in time range)   atorvastatin tablet 10 mg (has no administration in time range)   doxazosin tablet 4 mg (has no administration in time range)   furosemide tablet 20 mg (has no administration in time range)   hydrALAZINE tablet 50 mg (has no administration in time range)   metoprolol succinate (TOPROL-XL) 24 hr tablet 25 mg (has no administration in time range)   NIFEdipine 24 hr tablet 60 mg (has no administration in time range)   pantoprazole EC tablet 40 mg (has no administration in time range)   predniSONE tablet 5 mg (has no administration in time range)   tacrolimus capsule 2 mg (has no administration in time range)   sodium chloride 0.9% flush 10 mL (has no administration in time range)    naloxone 0.4 mg/mL injection 0.02 mg (has no administration in time range)   glucose chewable tablet 16 g (has no administration in time range)   glucose chewable tablet 24 g (has no administration in time range)   glucagon (human recombinant) injection 1 mg (has no administration in time range)   acetaminophen tablet 650 mg (has no administration in time range)   oxyCODONE immediate release tablet 5 mg (has no administration in time range)   polyethylene glycol packet 17 g (has no administration in time range)   ondansetron injection 4 mg (has no administration in time range)   albuterol inhaler 2 puff (has no administration in time range)   melatonin tablet 6 mg (has no administration in time range)   aluminum-magnesium hydroxide-simethicone 200-200-20 mg/5 mL suspension 30 mL (has no administration in time range)   dextrose 10% bolus 125 mL 125 mL (has no administration in time range)   dextrose 10% bolus 250 mL 250 mL (has no administration in time range)   HYDROcodone-acetaminophen 5-325 mg per tablet 1 tablet (1 tablet Oral Given 8/29/23 1644)   sodium chloride 0.9% bolus 500 mL 500 mL (0 mLs Intravenous Stopped 8/29/23 1744)     Medical Decision Making  Emergent evaluation of a 67 y.o. male presenting to the emergency department complaining of right 3rd digit pain and swelling that began a few days ago.  No systemic symptoms. Patient is afebrile, hemodynamically stable, and non toxic appearing.     Patient presenting with atraumatic right 3rd digit pain and swelling that began several days ago.  His physical exam is concerning for flexor tenosynovitis.  I will order lab work and imaging.  I will consult Orthopedics.    Orthopedics has evaluated the patient.  They agree that the patient's physical exam is concerning for flexor tenosynovitis.  They plan to take him to the OR tomorrow for a washout.  They recommend Unasyn in the meantime.  No leukocytosis.  ESR and CRP elevated.  Creatinine today is 3.9.  This  is slightly higher than baseline, concerning for KATINA.  He denies any NSAID use.     Will admit to Hospital Medicine for further workup and treatment.  Discussed with patient who is agreeable.  All questions answered.    Problems Addressed:  KATINA (acute kidney injury): acute illness or injury  Flexor tenosynovitis of finger: complicated acute illness or injury    Amount and/or Complexity of Data Reviewed  Labs: ordered. Decision-making details documented in ED Course.  Radiology: ordered.    Risk  Prescription drug management.               ED Course as of 08/29/23 1926   Tue Aug 29, 2023   1634 WBC: 8.17 [JM]   1634 Hemoglobin(!): 9.9 [JM]   1634 Hematocrit(!): 31.8 [JM]   1634 Platelets: 204 [JM]   1634 BUN(!): 25 [JM]   1634 Creatinine(!): 3.9  Creatinine up from baseline, suggestive of KATINA. [JM]   1634 Sed Rate(!): >120 [JM]   1634 CRP(!): 17.7 [JM]      ED Course User Index  [JM] Amy Briceño PA-C                    Clinical Impression:   Final diagnoses:  [M65.9] Flexor tenosynovitis of finger (Primary)  [N17.9] KATINA (acute kidney injury)        ED Disposition Condition    Admit Stable                Amy Briceño PA-C  08/29/23 1926

## 2023-08-29 NOTE — ED NOTES
Patient with limited IV access, states he does not want any more IV attempts, declines further blood attempts, will inform PA

## 2023-08-29 NOTE — ED TRIAGE NOTES
Pt. Is a 67 yr old -American male presenting with a swollen right middle finger.  Pt. Believes he may have been bitten by a bug.

## 2023-08-29 NOTE — SUBJECTIVE & OBJECTIVE
"Past Medical History:   Diagnosis Date    Atrial fibrillation     CAD (coronary artery disease) 2006    MI in 2006    CHF (congestive heart failure) 2017    CKD (chronic kidney disease) stage 3, GFR 30-59 ml/min     Dr. Latif.  in transplanted kidney    COVID-19 2/7/2023    Diverticulosis     Hyperlipidemia     Hypertension     Keloid cicatrix     Metabolic bone disease     Other emphysema 10/1/2019    Pericarditis     S/P kidney transplant 1992    x2 (1992 & 2005),    Thrombocytopenia     Thyroid disease     Tobacco use 09/05/2014       Past Surgical History:   Procedure Laterality Date    CARDIOVERSION  04/30/15    CHOLECYSTECTOMY      COLONOSCOPY  April 20, 2011    Diverticulosis, repeat recommended in 3 yrs., repeat colonoscopy 2014 revealed 2 polyps.  He should return in 5 years.    COLONOSCOPY N/A 3/13/2020    Procedure: COLONOSCOPY;  Surgeon: Chon Casper MD;  Location: [facility] (4TH FLR);  Service: Endoscopy;  Laterality: N/A;  ok to hold coumadin x5days-see telephone encounter 2/4/20-tb    COLONOSCOPY N/A 2/4/2021    Procedure: COLONOSCOPY;  Surgeon: Chon Casper MD;  Location: [facility] (4TH FLR);  Service: Endoscopy;  Laterality: N/A;  Eliquis - per Dr. GAVIN Blunt ok to hold x 2 days and "restarted asap"- ERW  last prep "poor", [id number] ordered/ Pt requests Dr. Casper  prep ins. mailed - COVID screening on 2/1/21 PCW- ERW    COLONOSCOPY N/A 3/3/2021    Procedure: COLONOSCOPY;  Surgeon: Chon Casper MD;  Location: [facility] (4TH FLR);  Service: Endoscopy;  Laterality: N/A;  Patient with his second poor bowel pre and has poor prep today.  If patient not intersted or can't get colonoscopy tomorrow will need constipation bowel prep and will need to restart his Eliquis today.Thanks,Chon     per Dr Blunt-ok to hold Eliquis 2 days prior      COVID     CORONARY ANGIOPLASTY WITH STENT PLACEMENT  9/13/2006    KIDNEY TRANSPLANT  2005    PARATHYROIDECTOMY      TREATMENT OF CARDIAC ARRHYTHMIA N/A " 3/28/2022    Procedure: CARDIOVERSION;  Surgeon: Migue Blunt MD;  Location: [facility];  Service: Cardiology;  Laterality: N/A;  AF, DCC, MAC, GP, 3 PREP*RODRIGO deferred pt 100% compliant*       Review of patient's allergies indicates:   Allergen Reactions    Ace inhibitors Swelling    Verapamil Other (See Comments)     Other reaction(s): Unknown    Povidone-iodine Itching       Current Facility-Administered Medications   Medication    HYDROcodone-acetaminophen 5-325 mg per tablet 1 tablet     Current Outpatient Medications   Medication Sig    amiodarone (PACERONE) 200 MG Tab Take 1 tablet (200 mg total) by mouth 2 (two) times daily for 14 days, THEN 1 tablet (200 mg total) once daily.    apixaban (ELIQUIS) 5 mg Tab Take 1 tablet (5 mg total) by mouth 2 (two) times daily.    aspirin (ECOTRIN) 81 MG EC tablet Take 1 tablet by mouth Daily.    atorvastatin (LIPITOR) 10 MG tablet TAKE 1 TABLET BY MOUTH EVERY DAY    b complex vitamins (B COMPLEX-VITAMIN B12) tablet Take 1 tablet by mouth once daily.    calcium carbonate (CALCIUM 600 ORAL) Take by mouth. Pt taking 1200 daily    doxazosin (CARDURA) 4 MG tablet Take 1 tablet (4 mg total) by mouth every 12 (twelve) hours.    famotidine (PEPCID) 20 MG tablet Take 1 tablet (20 mg total) by mouth 2 (two) times daily.    furosemide (LASIX) 40 MG tablet Take 20 mg by mouth 2 (two) times daily.    hydrALAZINE (APRESOLINE) 50 MG tablet TAKE 1 TABLET (50 MG TOTAL) BY MOUTH 3 (THREE) TIMES DAILY.    metoprolol succinate (TOPROL-XL) 25 MG 24 hr tablet Take 1 tablet (25 mg total) by mouth once daily.    MITIGARE 0.6 mg Cap TAKE 1 CAPSULE BY MOUTH TWICE A DAY AS NEEDED GOUT FLARE UP TO 3 DAYS    multivitamin (THERAGRAN) per tablet Take 1 tablet by mouth Daily.    NIFEdipine (PROCARDIA-XL) 60 MG (OSM) 24 hr tablet Take 1 tablet (60 mg total) by mouth 2 (two) times a day.    omeprazole (PRILOSEC) 20 MG capsule Take 1 capsule (20 mg total) by mouth once daily.    paricalcitoL (ZEMPLAR) 1  MCG capsule TAKE 1 CAPSULE ON MONDAY, WEDNESDAY AND FRIDAY    predniSONE (DELTASONE) 5 MG tablet TAKE 1 TABLET BY MOUTH EVERY DAY IN THE MORNING    pulse oximeter (PULSE OXIMETER) device by Apply Externally route 2 (two) times a day. Use twice daily at 8 AM and 3 PM and record the value in MyChart as directed.    tacrolimus (PROGRAF) 1 MG Cap Take 2 capsules (2 mg total) by mouth every morning AND 2 capsules (2 mg total) every evening.    vitamin D 1000 units Tab Take 370 mg by mouth 2 (two) times daily. 2 tablets BID    acetaminophen (TYLENOL) 500 MG tablet Take 1 tablet (500 mg total) by mouth every 6 (six) hours as needed for Pain.    albuterol (PROVENTIL) 2.5 mg /3 mL (0.083 %) nebulizer solution Take 3 mLs (2.5 mg total) by nebulization every 6 (six) hours as needed for Wheezing. Rescue    albuterol (VENTOLIN HFA) 90 mcg/actuation inhaler Inhale 2 puffs into the lungs every 6 (six) hours as needed for Wheezing or Shortness of Breath. Rescue    ammonium lactate 12 % Crea Apply 1 g topically once daily.    fluticasone propion-salmeterol 115-21 mcg/dose (ADVAIR HFA) 115-21 mcg/actuation HFAA inhaler Inhale 2 puffs into the lungs every 12 (twelve) hours. Controller (Patient taking differently: Inhale 2 puffs into the lungs as needed. Controller)    fluticasone propionate (FLONASE) 50 mcg/actuation nasal spray 1 spray (50 mcg total) by Each Nostril route once daily. (Patient taking differently: 1 spray by Each Nostril route as needed.)    gabapentin (NEURONTIN) 100 MG capsule Take 1 capsule (100 mg total) by mouth every evening. 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     Patient is currently retired   Substance and Sexual Activity    Alcohol use: No    Drug use: No    Sexual activity: Yes     Partners: Female     ROS  Constitutional: Denies fever/chills  Eyes: Denies change in vision  ENT: Denies sore throat or rhinorrhea   Respiratory: Denies shortness of breath or cough  Cardiovascular: Denies chest pain or palpitations  Gastrointestinal: Denies abdominal pain, nausea, or vomiting  Genitourinary: Denies dysuria and flank pain  Skin: Denies new rash or skin lesions   Allergic/Immunologic: Denies adverse reactions to current medications  Neurological: Denies headaches or dizziness  Musculoskeletal: see HPI    Objective:     Vital Signs (Most Recent):  Temp: 97.9 °F (36.6 °C) (23 1240)  Pulse: 75 (23 1240)  Resp: 16 (23 1240)  BP: 138/61 (23 1241)  SpO2: 99 % (23 1240) Vital Signs (24h Range):  Temp:  [97.9 °F (36.6 °C)] 97.9 °F (36.6 °C)  Pulse:  [75] 75  Resp:  [16] 16  SpO2:  [99 %] 99 %  BP: (138)/(61) 138/61     Weight: 95.3 kg (210 lb)     Body mass index is 27.71 kg/m².    No intake or output data in the 24 hours ending 23 1511     Ortho/SPM Exam  Physical Exam:  General:  no apparent distress, WDWN  HENT:  NCAT, Bilateral ears/eyes normal  CV:  Normal pulses, color, and cap refill  Lungs:  Normal respiratory effort  Neuro: No FND, awake, alert  Psych:  Oriented to Person, Place, Time, and Situation    MSK:       LUE:  Inspection: Skin intact throughout, no swelling, no effusions, no ecchymosis   Palpation: Non-TTP throughout, no palpable abnormality.   ROM: AROM and PROM of the shoulder, elbow, wrist, and hand intact without pain.   Neuro:  "AIN/PIN/Radial/Median/Ulnar Nerves assessed in isolation without deficit.   SILT throughout.    Vascular: Radial artery palpated 2+. Capillary refill <3s.         RUE:  Inspection: Skin intact throughout, no open wounds  Fusiform swelling of the middle finger  No spreading erythema or signs of overlying cellulitis   Palpation: TTP along the flexor tendon sheath of the middle finger; otherwise non-TTP throughout.  Compartments of the hand are soft and compressible; no tenderness to palpation of the palm.   ROM: AROM and PROM of the shoulder, elbow, wrist intact without pain.  Active and passive range of motion at the MF DIPJ and PIPJ limited 2/2 pain  Pain with passive extension of the MF  Full painless active and passive ROM at the 3rd MCPJ   Neuro: AIN/PIN/Radial/Median/Ulnar Nerves assessed in isolation without deficit.  Able to give thumbs up, make "OK" sign, cross IF/LF, abduct/adduct fingers  SILT M/U/R nerve distributions.    Vascular: Radial artery palpated 2+. Capillary refill <3s.               LLE:  Inspection: Skin intact throughout, no swelling, no effusions, no ecchymosis   Palpation: Non-TTP throughout. No palpable abnormality.   ROM: AROM and PROM of the hip, knee, ankle, and foot intact without pain.   Neuro: TA/EHL/Gastroc/FHL assessed in isolation without deficit.   SILT throughout.    Vascular: Foot is WWP. Capillary refill <3s.         RLE:  Inspection: Skin intact throughout, no swelling, no effusions, no ecchymosis   Palpation: Non-TTP throughout. No palpable abnormality.   ROM: AROM and PROM of the hip, knee, ankle, and foot intact without pain.   Neuro: TA/EHL/Gastroc/FHL assessed in isolation without deficit.   SILT throughout.    Vascular: Foot is WWP. Capillary refill <3s.        Spine/pelvis/axial body:  No tenderness to palpation of cervical, thoracic, or lumbar spine     Significant Labs: CRP:   Recent Labs   Lab 08/29/23  1556   CRP 17.7*     All pertinent labs within the past 24 hours " have been reviewed.    Significant Imaging: I have reviewed and interpreted all pertinent imaging results/findings.  MRI of the right hand and fingers showing fluid collection along the middle finger flexor tendon sheath, an effusion at the PIPJ, and circumferential edema most prominent at the proximal and middle phalanx of the MF

## 2023-08-30 ENCOUNTER — ANESTHESIA EVENT (OUTPATIENT)
Dept: SURGERY | Facility: HOSPITAL | Age: 68
DRG: 513 | End: 2023-08-30
Payer: MEDICARE

## 2023-08-30 ENCOUNTER — ANESTHESIA (OUTPATIENT)
Dept: SURGERY | Facility: HOSPITAL | Age: 68
DRG: 513 | End: 2023-08-30
Payer: MEDICARE

## 2023-08-30 PROBLEM — M25.521 RIGHT ELBOW PAIN: Status: RESOLVED | Noted: 2020-11-29 | Resolved: 2023-08-30

## 2023-08-30 PROBLEM — R79.89 ELEVATED TROPONIN: Status: RESOLVED | Noted: 2017-01-22 | Resolved: 2023-08-30

## 2023-08-30 PROBLEM — Z86.79 HISTORY OF PERICARDITIS: Status: RESOLVED | Noted: 2017-01-23 | Resolved: 2023-08-30

## 2023-08-30 PROBLEM — E87.6 HYPOKALEMIA: Status: RESOLVED | Noted: 2019-06-16 | Resolved: 2023-08-30

## 2023-08-30 PROBLEM — M25.571 RIGHT ANKLE PAIN: Status: RESOLVED | Noted: 2023-02-26 | Resolved: 2023-08-30

## 2023-08-30 PROBLEM — N18.4 ANEMIA IN STAGE 4 CHRONIC KIDNEY DISEASE: Status: ACTIVE | Noted: 2023-08-30

## 2023-08-30 PROBLEM — D63.1 ANEMIA IN STAGE 4 CHRONIC KIDNEY DISEASE: Status: ACTIVE | Noted: 2023-08-30

## 2023-08-30 PROBLEM — N17.9 ACUTE RENAL FAILURE SUPERIMPOSED ON STAGE 4 CHRONIC KIDNEY DISEASE: Status: ACTIVE | Noted: 2023-03-17

## 2023-08-30 PROBLEM — M00.9 PYOGENIC ARTHRITIS OF RIGHT HAND: Status: ACTIVE | Noted: 2023-08-30

## 2023-08-30 LAB
ALBUMIN SERPL BCP-MCNC: 2.6 G/DL (ref 3.5–5.2)
ALP SERPL-CCNC: 54 U/L (ref 55–135)
ALT SERPL W/O P-5'-P-CCNC: 12 U/L (ref 10–44)
ANION GAP SERPL CALC-SCNC: 10 MMOL/L (ref 8–16)
AST SERPL-CCNC: 19 U/L (ref 10–40)
BACTERIA #/AREA URNS AUTO: NORMAL /HPF
BASOPHILS # BLD AUTO: 0.02 K/UL (ref 0–0.2)
BASOPHILS NFR BLD: 0.3 % (ref 0–1.9)
BILIRUB SERPL-MCNC: 0.3 MG/DL (ref 0.1–1)
BILIRUB UR QL STRIP: NEGATIVE
BUN SERPL-MCNC: 28 MG/DL (ref 8–23)
CALCIUM SERPL-MCNC: 8.1 MG/DL (ref 8.7–10.5)
CHLORIDE SERPL-SCNC: 112 MMOL/L (ref 95–110)
CLARITY UR REFRACT.AUTO: CLEAR
CO2 SERPL-SCNC: 20 MMOL/L (ref 23–29)
COLOR UR AUTO: YELLOW
CREAT SERPL-MCNC: 3.8 MG/DL (ref 0.5–1.4)
CREAT UR-MCNC: 64 MG/DL (ref 23–375)
DIFFERENTIAL METHOD: ABNORMAL
EOSINOPHIL # BLD AUTO: 1.1 K/UL (ref 0–0.5)
EOSINOPHIL NFR BLD: 15.7 % (ref 0–8)
ERYTHROCYTE [DISTWIDTH] IN BLOOD BY AUTOMATED COUNT: 17.5 % (ref 11.5–14.5)
EST. GFR  (NO RACE VARIABLE): 16.5 ML/MIN/1.73 M^2
GLUCOSE SERPL-MCNC: 80 MG/DL (ref 70–110)
GLUCOSE UR QL STRIP: NEGATIVE
GRAM STN SPEC: NORMAL
HCT VFR BLD AUTO: 25.6 % (ref 40–54)
HGB BLD-MCNC: 8 G/DL (ref 14–18)
HGB UR QL STRIP: NEGATIVE
HYALINE CASTS UR QL AUTO: 0 /LPF
IMM GRANULOCYTES # BLD AUTO: 0.01 K/UL (ref 0–0.04)
IMM GRANULOCYTES NFR BLD AUTO: 0.1 % (ref 0–0.5)
KETONES UR QL STRIP: NEGATIVE
LEUKOCYTE ESTERASE UR QL STRIP: NEGATIVE
LYMPHOCYTES # BLD AUTO: 1.8 K/UL (ref 1–4.8)
LYMPHOCYTES NFR BLD: 25.5 % (ref 18–48)
MAGNESIUM SERPL-MCNC: 1.9 MG/DL (ref 1.6–2.6)
MCH RBC QN AUTO: 24 PG (ref 27–31)
MCHC RBC AUTO-ENTMCNC: 31.3 G/DL (ref 32–36)
MCV RBC AUTO: 77 FL (ref 82–98)
MICROSCOPIC COMMENT: NORMAL
MONOCYTES # BLD AUTO: 0.7 K/UL (ref 0.3–1)
MONOCYTES NFR BLD: 9.9 % (ref 4–15)
NEUTROPHILS # BLD AUTO: 3.4 K/UL (ref 1.8–7.7)
NEUTROPHILS NFR BLD: 48.5 % (ref 38–73)
NITRITE UR QL STRIP: NEGATIVE
NRBC BLD-RTO: 0 /100 WBC
PH UR STRIP: 7 [PH] (ref 5–8)
PHOSPHATE SERPL-MCNC: 5 MG/DL (ref 2.7–4.5)
PLATELET # BLD AUTO: 171 K/UL (ref 150–450)
PMV BLD AUTO: 12.1 FL (ref 9.2–12.9)
POTASSIUM SERPL-SCNC: 4.1 MMOL/L (ref 3.5–5.1)
PROT SERPL-MCNC: 5.9 G/DL (ref 6–8.4)
PROT UR QL STRIP: ABNORMAL
PROT UR-MCNC: 144 MG/DL (ref 0–15)
PROT/CREAT UR: 2.25 MG/G{CREAT} (ref 0–0.2)
RBC # BLD AUTO: 3.34 M/UL (ref 4.6–6.2)
RBC #/AREA URNS AUTO: 1 /HPF (ref 0–4)
SODIUM SERPL-SCNC: 142 MMOL/L (ref 136–145)
SP GR UR STRIP: 1.01 (ref 1–1.03)
TACROLIMUS BLD-MCNC: 3.6 NG/ML (ref 5–15)
TACROLIMUS BLD-MCNC: 4.6 NG/ML (ref 5–15)
TACROLIMUS BLD-MCNC: 5.7 NG/ML (ref 5–15)
URN SPEC COLLECT METH UR: ABNORMAL
WBC # BLD AUTO: 7.07 K/UL (ref 3.9–12.7)
WBC #/AREA URNS AUTO: 3 /HPF (ref 0–5)

## 2023-08-30 PROCEDURE — 25000003 PHARM REV CODE 250: Performed by: ORTHOPAEDIC SURGERY

## 2023-08-30 PROCEDURE — 99223 1ST HOSP IP/OBS HIGH 75: CPT | Mod: ,,, | Performed by: INTERNAL MEDICINE

## 2023-08-30 PROCEDURE — 63600175 PHARM REV CODE 636 W HCPCS: Performed by: INTERNAL MEDICINE

## 2023-08-30 PROCEDURE — 87116 MYCOBACTERIA CULTURE: CPT | Performed by: ORTHOPAEDIC SURGERY

## 2023-08-30 PROCEDURE — 37000008 HC ANESTHESIA 1ST 15 MINUTES: Performed by: ORTHOPAEDIC SURGERY

## 2023-08-30 PROCEDURE — 63600175 PHARM REV CODE 636 W HCPCS: Performed by: FAMILY MEDICINE

## 2023-08-30 PROCEDURE — 63600175 PHARM REV CODE 636 W HCPCS: Performed by: ORTHOPAEDIC SURGERY

## 2023-08-30 PROCEDURE — 85025 COMPLETE CBC W/AUTO DIFF WBC: CPT | Performed by: FAMILY MEDICINE

## 2023-08-30 PROCEDURE — 37000009 HC ANESTHESIA EA ADD 15 MINS: Performed by: ORTHOPAEDIC SURGERY

## 2023-08-30 PROCEDURE — 99223 PR INITIAL HOSPITAL CARE,LEVL III: ICD-10-PCS | Mod: 57,,, | Performed by: ORTHOPAEDIC SURGERY

## 2023-08-30 PROCEDURE — 25000003 PHARM REV CODE 250: Performed by: FAMILY MEDICINE

## 2023-08-30 PROCEDURE — 87070 CULTURE OTHR SPECIMN AEROBIC: CPT | Performed by: ORTHOPAEDIC SURGERY

## 2023-08-30 PROCEDURE — 87206 SMEAR FLUORESCENT/ACID STAI: CPT | Performed by: ORTHOPAEDIC SURGERY

## 2023-08-30 PROCEDURE — 36000707: Performed by: ORTHOPAEDIC SURGERY

## 2023-08-30 PROCEDURE — 83735 ASSAY OF MAGNESIUM: CPT | Performed by: FAMILY MEDICINE

## 2023-08-30 PROCEDURE — 71000033 HC RECOVERY, INTIAL HOUR: Performed by: ORTHOPAEDIC SURGERY

## 2023-08-30 PROCEDURE — 80053 COMPREHEN METABOLIC PANEL: CPT | Performed by: FAMILY MEDICINE

## 2023-08-30 PROCEDURE — 11000001 HC ACUTE MED/SURG PRIVATE ROOM

## 2023-08-30 PROCEDURE — 84100 ASSAY OF PHOSPHORUS: CPT | Performed by: FAMILY MEDICINE

## 2023-08-30 PROCEDURE — 97165 OT EVAL LOW COMPLEX 30 MIN: CPT

## 2023-08-30 PROCEDURE — 25000003 PHARM REV CODE 250: Performed by: NURSE ANESTHETIST, CERTIFIED REGISTERED

## 2023-08-30 PROCEDURE — 81001 URINALYSIS AUTO W/SCOPE: CPT | Performed by: INTERNAL MEDICINE

## 2023-08-30 PROCEDURE — 99222 1ST HOSP IP/OBS MODERATE 55: CPT | Mod: ,,, | Performed by: INTERNAL MEDICINE

## 2023-08-30 PROCEDURE — 87205 SMEAR GRAM STAIN: CPT | Performed by: ORTHOPAEDIC SURGERY

## 2023-08-30 PROCEDURE — 99222 PR INITIAL HOSPITAL CARE,LEVL II: ICD-10-PCS | Mod: ,,, | Performed by: INTERNAL MEDICINE

## 2023-08-30 PROCEDURE — 26080 PR EXPLORE/TREAT INTERPHALANGEAL JT,EA: ICD-10-PCS | Mod: F7,,, | Performed by: ORTHOPAEDIC SURGERY

## 2023-08-30 PROCEDURE — 84156 ASSAY OF PROTEIN URINE: CPT | Performed by: INTERNAL MEDICINE

## 2023-08-30 PROCEDURE — 25000003 PHARM REV CODE 250: Performed by: INTERNAL MEDICINE

## 2023-08-30 PROCEDURE — 99233 SBSQ HOSP IP/OBS HIGH 50: CPT | Mod: ,,, | Performed by: INTERNAL MEDICINE

## 2023-08-30 PROCEDURE — 87075 CULTR BACTERIA EXCEPT BLOOD: CPT | Mod: 59 | Performed by: ORTHOPAEDIC SURGERY

## 2023-08-30 PROCEDURE — 87102 FUNGUS ISOLATION CULTURE: CPT | Mod: 59 | Performed by: ORTHOPAEDIC SURGERY

## 2023-08-30 PROCEDURE — 99233 PR SUBSEQUENT HOSPITAL CARE,LEVL III: ICD-10-PCS | Mod: ,,, | Performed by: INTERNAL MEDICINE

## 2023-08-30 PROCEDURE — 99223 1ST HOSP IP/OBS HIGH 75: CPT | Mod: 57,,, | Performed by: ORTHOPAEDIC SURGERY

## 2023-08-30 PROCEDURE — D9220A PRA ANESTHESIA: Mod: CRNA,,, | Performed by: NURSE ANESTHETIST, CERTIFIED REGISTERED

## 2023-08-30 PROCEDURE — 94761 N-INVAS EAR/PLS OXIMETRY MLT: CPT

## 2023-08-30 PROCEDURE — D9220A PRA ANESTHESIA: ICD-10-PCS | Mod: ANES,,, | Performed by: ANESTHESIOLOGY

## 2023-08-30 PROCEDURE — 36415 COLL VENOUS BLD VENIPUNCTURE: CPT | Performed by: FAMILY MEDICINE

## 2023-08-30 PROCEDURE — 63600175 PHARM REV CODE 636 W HCPCS: Performed by: NURSE ANESTHETIST, CERTIFIED REGISTERED

## 2023-08-30 PROCEDURE — 71000015 HC POSTOP RECOV 1ST HR: Performed by: ORTHOPAEDIC SURGERY

## 2023-08-30 PROCEDURE — 99223 PR INITIAL HOSPITAL CARE,LEVL III: ICD-10-PCS | Mod: ,,, | Performed by: INTERNAL MEDICINE

## 2023-08-30 PROCEDURE — D9220A PRA ANESTHESIA: ICD-10-PCS | Mod: CRNA,,, | Performed by: NURSE ANESTHETIST, CERTIFIED REGISTERED

## 2023-08-30 PROCEDURE — D9220A PRA ANESTHESIA: Mod: ANES,,, | Performed by: ANESTHESIOLOGY

## 2023-08-30 PROCEDURE — 80197 ASSAY OF TACROLIMUS: CPT | Performed by: FAMILY MEDICINE

## 2023-08-30 PROCEDURE — 26080 EXPLORE/TREAT FINGER JOINT: CPT | Mod: F7,,, | Performed by: ORTHOPAEDIC SURGERY

## 2023-08-30 PROCEDURE — 36000706: Performed by: ORTHOPAEDIC SURGERY

## 2023-08-30 RX ORDER — METHOCARBAMOL 500 MG/1
500 TABLET, FILM COATED ORAL 4 TIMES DAILY
Status: DISCONTINUED | OUTPATIENT
Start: 2023-08-30 | End: 2023-09-02 | Stop reason: HOSPADM

## 2023-08-30 RX ORDER — BUTALBITAL, ACETAMINOPHEN AND CAFFEINE 50; 325; 40 MG/1; MG/1; MG/1
1 TABLET ORAL ONCE
Status: COMPLETED | OUTPATIENT
Start: 2023-08-30 | End: 2023-08-30

## 2023-08-30 RX ORDER — ONDANSETRON 2 MG/ML
INJECTION INTRAMUSCULAR; INTRAVENOUS
Status: DISCONTINUED | OUTPATIENT
Start: 2023-08-30 | End: 2023-08-30

## 2023-08-30 RX ORDER — LIDOCAINE HYDROCHLORIDE 20 MG/ML
INJECTION, SOLUTION EPIDURAL; INFILTRATION; INTRACAUDAL; PERINEURAL
Status: DISCONTINUED | OUTPATIENT
Start: 2023-08-30 | End: 2023-08-30

## 2023-08-30 RX ORDER — PROPOFOL 10 MG/ML
VIAL (ML) INTRAVENOUS CONTINUOUS PRN
Status: DISCONTINUED | OUTPATIENT
Start: 2023-08-30 | End: 2023-08-30

## 2023-08-30 RX ORDER — ACETAMINOPHEN 500 MG
1000 TABLET ORAL EVERY 8 HOURS
Status: DISCONTINUED | OUTPATIENT
Start: 2023-08-30 | End: 2023-09-02 | Stop reason: HOSPADM

## 2023-08-30 RX ORDER — DEXAMETHASONE SODIUM PHOSPHATE 4 MG/ML
INJECTION, SOLUTION INTRA-ARTICULAR; INTRALESIONAL; INTRAMUSCULAR; INTRAVENOUS; SOFT TISSUE
Status: DISCONTINUED | OUTPATIENT
Start: 2023-08-30 | End: 2023-08-30

## 2023-08-30 RX ORDER — CEFAZOLIN SODIUM 1 G/3ML
INJECTION, POWDER, FOR SOLUTION INTRAMUSCULAR; INTRAVENOUS
Status: DISCONTINUED | OUTPATIENT
Start: 2023-08-30 | End: 2023-08-30

## 2023-08-30 RX ORDER — FLUTICASONE FUROATE AND VILANTEROL 200; 25 UG/1; UG/1
1 POWDER RESPIRATORY (INHALATION) DAILY
Status: DISCONTINUED | OUTPATIENT
Start: 2023-08-30 | End: 2023-09-02 | Stop reason: HOSPADM

## 2023-08-30 RX ORDER — PROPOFOL 10 MG/ML
VIAL (ML) INTRAVENOUS
Status: DISCONTINUED | OUTPATIENT
Start: 2023-08-30 | End: 2023-08-30

## 2023-08-30 RX ORDER — MIDAZOLAM HYDROCHLORIDE 1 MG/ML
INJECTION INTRAMUSCULAR; INTRAVENOUS
Status: DISCONTINUED | OUTPATIENT
Start: 2023-08-30 | End: 2023-08-30

## 2023-08-30 RX ORDER — BUPIVACAINE HYDROCHLORIDE 2.5 MG/ML
INJECTION, SOLUTION EPIDURAL; INFILTRATION; INTRACAUDAL
Status: DISCONTINUED | OUTPATIENT
Start: 2023-08-30 | End: 2023-08-30 | Stop reason: HOSPADM

## 2023-08-30 RX ORDER — LIDOCAINE HYDROCHLORIDE 10 MG/ML
INJECTION, SOLUTION EPIDURAL; INFILTRATION; INTRACAUDAL; PERINEURAL
Status: DISCONTINUED | OUTPATIENT
Start: 2023-08-30 | End: 2023-08-30 | Stop reason: HOSPADM

## 2023-08-30 RX ORDER — FENTANYL CITRATE 50 UG/ML
INJECTION, SOLUTION INTRAMUSCULAR; INTRAVENOUS
Status: DISCONTINUED | OUTPATIENT
Start: 2023-08-30 | End: 2023-08-30

## 2023-08-30 RX ADMIN — FENTANYL CITRATE 25 MCG: 50 INJECTION, SOLUTION INTRAMUSCULAR; INTRAVENOUS at 07:08

## 2023-08-30 RX ADMIN — METHOCARBAMOL 500 MG: 500 TABLET ORAL at 02:08

## 2023-08-30 RX ADMIN — DOXAZOSIN 4 MG: 2 TABLET ORAL at 09:08

## 2023-08-30 RX ADMIN — PREDNISONE 5 MG: 5 TABLET ORAL at 09:08

## 2023-08-30 RX ADMIN — DOXAZOSIN 4 MG: 2 TABLET ORAL at 10:08

## 2023-08-30 RX ADMIN — ATORVASTATIN CALCIUM 10 MG: 10 TABLET, FILM COATED ORAL at 09:08

## 2023-08-30 RX ADMIN — ACETAMINOPHEN 1000 MG: 500 TABLET ORAL at 09:08

## 2023-08-30 RX ADMIN — OXYCODONE HYDROCHLORIDE 5 MG: 5 TABLET ORAL at 02:08

## 2023-08-30 RX ADMIN — TACROLIMUS 2 MG: 1 CAPSULE ORAL at 09:08

## 2023-08-30 RX ADMIN — LIDOCAINE HYDROCHLORIDE 40 MG: 20 INJECTION, SOLUTION EPIDURAL; INFILTRATION; INTRACAUDAL; PERINEURAL at 08:08

## 2023-08-30 RX ADMIN — POLYETHYLENE GLYCOL 3350 17 G: 17 POWDER, FOR SOLUTION ORAL at 09:08

## 2023-08-30 RX ADMIN — PANTOPRAZOLE SODIUM 40 MG: 40 TABLET, DELAYED RELEASE ORAL at 09:08

## 2023-08-30 RX ADMIN — FUROSEMIDE 20 MG: 20 TABLET ORAL at 05:08

## 2023-08-30 RX ADMIN — HYDRALAZINE HYDROCHLORIDE 50 MG: 50 TABLET ORAL at 09:08

## 2023-08-30 RX ADMIN — CEFTRIAXONE 2 G: 2 INJECTION, POWDER, FOR SOLUTION INTRAMUSCULAR; INTRAVENOUS at 04:08

## 2023-08-30 RX ADMIN — AMIODARONE HYDROCHLORIDE 200 MG: 200 TABLET ORAL at 09:08

## 2023-08-30 RX ADMIN — METHOCARBAMOL 500 MG: 500 TABLET ORAL at 09:08

## 2023-08-30 RX ADMIN — MIDAZOLAM HYDROCHLORIDE 2 MG: 1 INJECTION INTRAMUSCULAR; INTRAVENOUS at 07:08

## 2023-08-30 RX ADMIN — ONDANSETRON 4 MG: 2 INJECTION INTRAMUSCULAR; INTRAVENOUS at 08:08

## 2023-08-30 RX ADMIN — AMPICILLIN SODIUM AND SULBACTAM SODIUM 3 G: 2; 1 INJECTION, POWDER, FOR SOLUTION INTRAMUSCULAR; INTRAVENOUS at 05:08

## 2023-08-30 RX ADMIN — PROPOFOL 150 MCG/KG/MIN: 10 INJECTION, EMULSION INTRAVENOUS at 08:08

## 2023-08-30 RX ADMIN — BUTALBITAL, ACETAMINOPHEN, AND CAFFEINE 1 TABLET: 50; 325; 40 TABLET ORAL at 05:08

## 2023-08-30 RX ADMIN — HYDRALAZINE HYDROCHLORIDE 50 MG: 50 TABLET ORAL at 02:08

## 2023-08-30 RX ADMIN — CEFAZOLIN 2 G: 330 INJECTION, POWDER, FOR SOLUTION INTRAMUSCULAR; INTRAVENOUS at 08:08

## 2023-08-30 RX ADMIN — SODIUM CHLORIDE: 0.9 INJECTION, SOLUTION INTRAVENOUS at 07:08

## 2023-08-30 RX ADMIN — METOPROLOL SUCCINATE 25 MG: 25 TABLET, EXTENDED RELEASE ORAL at 09:08

## 2023-08-30 RX ADMIN — NIFEDIPINE 60 MG: 30 TABLET, FILM COATED, EXTENDED RELEASE ORAL at 10:08

## 2023-08-30 RX ADMIN — NIFEDIPINE 60 MG: 30 TABLET, FILM COATED, EXTENDED RELEASE ORAL at 09:08

## 2023-08-30 RX ADMIN — FUROSEMIDE 20 MG: 20 TABLET ORAL at 10:08

## 2023-08-30 RX ADMIN — ACETAMINOPHEN 1000 MG: 500 TABLET ORAL at 02:08

## 2023-08-30 RX ADMIN — DEXAMETHASONE SODIUM PHOSPHATE 4 MG: 4 INJECTION, SOLUTION INTRAMUSCULAR; INTRAVENOUS at 08:08

## 2023-08-30 RX ADMIN — PROPOFOL 50 MG: 10 INJECTION, EMULSION INTRAVENOUS at 08:08

## 2023-08-30 RX ADMIN — DAPTOMYCIN 760 MG: 350 INJECTION, POWDER, LYOPHILIZED, FOR SOLUTION INTRAVENOUS at 02:08

## 2023-08-30 NOTE — ASSESSMENT & PLAN NOTE
Chronic and controlled. Continue home Cardura, Nifedipine, Hydralazine and Metoprolol to treat. Monitor vital signs every 4 hours. Target BP < 140/90.

## 2023-08-30 NOTE — HPI
Patient is a 67-year-old male with a past medical history of CAD, atrial fibrillation (on Eliquis), heart failure, CKD, hypertension, hyperlipidemia, kidney transplant (on prograf), who presents for right middle finger pain and swelling that began 5 days prior to presentation.  States symptoms have progressively worsened since onset and he now is unable to move the finger without severe pain.  He denies any recent trauma or open wounds at his right hand and has never had a similar episode previously.  Denies any fevers/chills or any other musculoskeletal pains at this time.  He is right-hand dominant, lives at home with his wife, and smokes around half a pack of cigarettes daily.  Not on any anticoagulation at baseline.

## 2023-08-30 NOTE — SUBJECTIVE & OBJECTIVE
"Past Medical History:   Diagnosis Date    Atrial fibrillation     CAD (coronary artery disease) 2006    MI in 2006    CHF (congestive heart failure) 2017    CKD (chronic kidney disease) stage 3, GFR 30-59 ml/min     Dr. Latif.  in transplanted kidney    COVID-19 2/7/2023    Diverticulosis     Hyperlipidemia     Hypertension     Keloid cicatrix     Metabolic bone disease     Other emphysema 10/1/2019    Pericarditis     S/P kidney transplant 1992    x2 (1992 & 2005),    Thrombocytopenia     Thyroid disease     Tobacco use 09/05/2014       Past Surgical History:   Procedure Laterality Date    CARDIOVERSION  04/30/15    CHOLECYSTECTOMY      COLONOSCOPY  April 20, 2011    Diverticulosis, repeat recommended in 3 yrs., repeat colonoscopy 2014 revealed 2 polyps.  He should return in 5 years.    COLONOSCOPY N/A 3/13/2020    Procedure: COLONOSCOPY;  Surgeon: Chon Casper MD;  Location: [facility] (4TH FLR);  Service: Endoscopy;  Laterality: N/A;  ok to hold coumadin x5days-see telephone encounter 2/4/20-tb    COLONOSCOPY N/A 2/4/2021    Procedure: COLONOSCOPY;  Surgeon: Chon Casper MD;  Location: [facility] (4TH FLR);  Service: Endoscopy;  Laterality: N/A;  Eliquis - per Dr. GAVIN Blunt ok to hold x 2 days and "restarted asap"- ERW  last prep "poor", [id number] ordered/ Pt requests Dr. Casper  prep ins. mailed - COVID screening on 2/1/21 PCW- ERW    COLONOSCOPY N/A 3/3/2021    Procedure: COLONOSCOPY;  Surgeon: Chon Casper MD;  Location: [facility] (4TH FLR);  Service: Endoscopy;  Laterality: N/A;  Patient with his second poor bowel pre and has poor prep today.  If patient not intersted or can't get colonoscopy tomorrow will need constipation bowel prep and will need to restart his Eliquis today.Thanks,Chon     per Dr Blunt-ok to hold Eliquis 2 days prior      COVID     CORONARY ANGIOPLASTY WITH STENT PLACEMENT  9/13/2006    KIDNEY TRANSPLANT  2005    PARATHYROIDECTOMY      TREATMENT OF CARDIAC ARRHYTHMIA N/A " 3/28/2022    Procedure: CARDIOVERSION;  Surgeon: Miuge Blunt MD;  Location: [facility];  Service: Cardiology;  Laterality: N/A;  AF, DCC, MAC, GP, 3 PREP*RODRIGO deferred pt 100% compliant*       Review of patient's allergies indicates:   Allergen Reactions    Ace inhibitors Swelling    Verapamil Other (See Comments)     Other reaction(s): Unknown    Povidone-iodine Itching       Medications:  Medications Prior to Admission   Medication Sig    amiodarone (PACERONE) 200 MG Tab Take 1 tablet (200 mg total) by mouth 2 (two) times daily for 14 days, THEN 1 tablet (200 mg total) once daily.    apixaban (ELIQUIS) 5 mg Tab Take 1 tablet (5 mg total) by mouth 2 (two) times daily.    aspirin (ECOTRIN) 81 MG EC tablet Take 1 tablet by mouth Daily.    atorvastatin (LIPITOR) 10 MG tablet TAKE 1 TABLET BY MOUTH EVERY DAY    b complex vitamins (B COMPLEX-VITAMIN B12) tablet Take 1 tablet by mouth once daily.    calcium carbonate (CALCIUM 600 ORAL) Take by mouth. Pt taking 1200 daily    doxazosin (CARDURA) 4 MG tablet Take 1 tablet (4 mg total) by mouth every 12 (twelve) hours.    famotidine (PEPCID) 20 MG tablet Take 1 tablet (20 mg total) by mouth 2 (two) times daily.    furosemide (LASIX) 40 MG tablet Take 20 mg by mouth 2 (two) times daily.    hydrALAZINE (APRESOLINE) 50 MG tablet TAKE 1 TABLET (50 MG TOTAL) BY MOUTH 3 (THREE) TIMES DAILY.    metoprolol succinate (TOPROL-XL) 25 MG 24 hr tablet Take 1 tablet (25 mg total) by mouth once daily.    MITIGARE 0.6 mg Cap TAKE 1 CAPSULE BY MOUTH TWICE A DAY AS NEEDED GOUT FLARE UP TO 3 DAYS    multivitamin (THERAGRAN) per tablet Take 1 tablet by mouth Daily.    NIFEdipine (PROCARDIA-XL) 60 MG (OSM) 24 hr tablet Take 1 tablet (60 mg total) by mouth 2 (two) times a day.    omeprazole (PRILOSEC) 20 MG capsule Take 1 capsule (20 mg total) by mouth once daily.    paricalcitoL (ZEMPLAR) 1 MCG capsule TAKE 1 CAPSULE ON MONDAY, WEDNESDAY AND FRIDAY    predniSONE (DELTASONE) 5 MG tablet TAKE  1 TABLET BY MOUTH EVERY DAY IN THE MORNING    pulse oximeter (PULSE OXIMETER) device by Apply Externally route 2 (two) times a day. Use twice daily at 8 AM and 3 PM and record the value in MyChart as directed.    tacrolimus (PROGRAF) 1 MG Cap Take 2 capsules (2 mg total) by mouth every morning AND 2 capsules (2 mg total) every evening.    vitamin D 1000 units Tab Take 370 mg by mouth 2 (two) times daily. 2 tablets BID    acetaminophen (TYLENOL) 500 MG tablet Take 1 tablet (500 mg total) by mouth every 6 (six) hours as needed for Pain.    albuterol (PROVENTIL) 2.5 mg /3 mL (0.083 %) nebulizer solution Take 3 mLs (2.5 mg total) by nebulization every 6 (six) hours as needed for Wheezing. Rescue    albuterol (VENTOLIN HFA) 90 mcg/actuation inhaler Inhale 2 puffs into the lungs every 6 (six) hours as needed for Wheezing or Shortness of Breath. Rescue    ammonium lactate 12 % Crea Apply 1 g topically once daily.    fluticasone propion-salmeterol 115-21 mcg/dose (ADVAIR HFA) 115-21 mcg/actuation HFAA inhaler Inhale 2 puffs into the lungs every 12 (twelve) hours. Controller (Patient taking differently: Inhale 2 puffs into the lungs as needed. Controller)    fluticasone propionate (FLONASE) 50 mcg/actuation nasal spray 1 spray (50 mcg total) by Each Nostril route once daily. (Patient taking differently: 1 spray by Each Nostril route as needed.)    gabapentin (NEURONTIN) 100 MG capsule Take 1 capsule (100 mg total) by mouth every evening. (Patient taking differently: Take 100 mg by mouth as needed.)     Antibiotics (From admission, onward)      Start     Stop Route Frequency Ordered    08/30/23 1600  cefTRIAXone (ROCEPHIN) 2 g in dextrose 5 % in water (D5W) 100 mL IVPB (MB+)         -- IV Every 24 hours (non-standard times) 08/30/23 1234    08/30/23 1400  DAPTOmycin (CUBICIN) 760 mg in sodium chloride 0.9% SolP 50 mL IVPB         -- IV Every 24 hours (non-standard times) 08/30/23 1235          Antifungals (From admission,  onward)      None          Antivirals (From admission, onward)      None             Immunization History   Administered Date(s) Administered    COVID-19, MRNA, LN-S, PF (Pfizer) (Purple Cap) 2021, 2021, 2021, 2022    COVID-19, mRNA, LNP-S, bivalent booster, PF (PFIZER OMICRON) 10/13/2022    Influenza (FLUAD) - Quadrivalent - Adjuvanted - PF *Preferred* (65+) 10/08/2020, 2021, 10/13/2022    Pneumococcal Conjugate - 13 Valent 2017    Pneumococcal Polysaccharide - 23 Valent 2013, 2020, 2020    Tdap 2018    Zoster Recombinant 10/08/2020, 10/08/2020, 2020, 2020       Family History       Problem Relation (Age of Onset)    Heart disease Father    Kidney disease Sister, Brother    Kidney failure Sister, Brother    No Known Problems Sister, Brother, Sister, Sister    Thyroid disease Mother          Social History     Socioeconomic History    Marital status:    Occupational History    Occupation:    Tobacco Use    Smoking status: Former     Current packs/day: 0.00     Average packs/day: 0.5 packs/day for 40.0 years (20.0 ttl pk-yrs)     Types: Cigarettes     Start date: 1982     Quit date: 2022     Years since quittin.5    Smokeless tobacco: Never    Tobacco comments:     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     Patient is currently retired   Substance and Sexual Activity    Alcohol use: No    Drug use: No    Sexual activity: Yes     Partners: Female   Social History Narrative    Retired      Social Determinants of Health     Financial Resource Strain: Low Risk  (2023)    Overall Financial Resource Strain (CARDIA)     Difficulty of Paying Living Expenses: Not hard at all   Food Insecurity: Unknown (2023)    Hunger Vital Sign     Ran Out of Food in the Last Year: Never true   Transportation Needs: No Transportation Needs (2023)    PRAPARE - Transportation      Lack of Transportation (Medical): No     Lack of Transportation (Non-Medical): No   Physical Activity: Inactive (8/30/2023)    Exercise Vital Sign     Days of Exercise per Week: 0 days     Minutes of Exercise per Session: 0 min   Stress: No Stress Concern Present (2/9/2023)    [employer] - Occupational Stress Questionnaire     Feeling of Stress : Only a little   Social Connections: Unknown (8/30/2023)    Social Connection and Isolation Panel [NHANES]     Frequency of Communication with Friends and Family: More than three times a week     Frequency of Social Gatherings with Friends and Family: Once a week   Housing Stability: Unknown (2/9/2023)    Housing Stability Vital Sign     Unable to Pay for Housing in the Last Year: No     Unstable Housing in the Last Year: No     Review of Systems   Constitutional:  Negative for chills, diaphoresis and fever.   HENT:  Negative for rhinorrhea and sore throat.    Respiratory:  Negative for cough and shortness of breath.    Cardiovascular:  Negative for chest pain and leg swelling.   Gastrointestinal:  Negative for abdominal pain, diarrhea, nausea and vomiting.   Genitourinary:  Negative for dysuria and hematuria.   Musculoskeletal:  Negative for arthralgias and myalgias.        Right third finger pain   Skin:  Negative for rash.   Neurological:  Negative for headaches.     Objective:     Vital Signs (Most Recent):  Temp: 98 °F (36.7 °C) (08/30/23 1642)  Pulse: 61 (08/30/23 1642)  Resp: 12 (08/30/23 1642)  BP: (!) 174/76 (08/30/23 1642)  SpO2: 98 % (08/30/23 1642) Vital Signs (24h Range):  Temp:  [96.4 °F (35.8 °C)-98.1 °F (36.7 °C)] 98 °F (36.7 °C)  Pulse:  [50-76] 61  Resp:  [9-24] 12  SpO2:  [93 %-100 %] 98 %  BP: (115-175)/(58-81) 174/76     Weight: 95.3 kg (210 lb)  Body mass index is 27.71 kg/m².    Estimated Creatinine Clearance: 21 mL/min (A) (based on SCr of 3.8 mg/dL (H)).     Physical Exam  Constitutional:       General: He is not in  acute distress.     Appearance: He is well-developed. He is not diaphoretic.   HENT:      Head: Normocephalic and atraumatic.      Nose: Nose normal.   Eyes:      Conjunctiva/sclera: Conjunctivae normal.   Pulmonary:      Effort: Pulmonary effort is normal. No respiratory distress.   Abdominal:      General: Abdomen is flat. There is no distension.   Musculoskeletal:      Cervical back: Normal range of motion.      Right lower leg: No edema.      Left lower leg: No edema.      Comments: Right third finger in cast   Skin:     General: Skin is warm and dry.      Findings: No erythema or rash.   Neurological:      Mental Status: He is alert.   Psychiatric:         Behavior: Behavior normal.          Significant Labs: All pertinent labs within the past 24 hours have been reviewed.    Significant Imaging: I have reviewed all pertinent imaging results/findings within the past 24 hours.

## 2023-08-30 NOTE — ASSESSMENT & PLAN NOTE
- continue home amiodarone and metoprolol  - holding home Eliquis for anticipated procedure  ;  Resume as appropriate

## 2023-08-30 NOTE — PT/OT/SLP EVAL
Occupational Therapy  Evaluation and Discharge Note    Name: Ibrahima Phelps  MRN: [id number]  Admitting Diagnosis: Flexor tenosynovitis of finger  Recent Surgery: Procedure(s) (LRB):  IRRIGATION AND DEBRIDEMENT, RIGHT MIDDLE FINGER (Right) Day of Surgery    Recommendations:     Discharge Recommendations: outpatient OT  Discharge Equipment Recommendations: none  Barriers to discharge:  None    Assessment:     Ibrahima Phelps is a 67 y.o. male with a medical diagnosis of Flexor tenosynovitis of finger. At this time, patient is functioning near their prior level of function and does not require further acute OT services as pt is able to make appropriate modifications to tasks, and has adequate assistance from wife as needed.     Plan:     During this hospitalization, patient does not require further acute OT services.  Please re-consult if situation changes.    Plan of Care Reviewed with: patient, spouse    Subjective     Chief Complaint: pain  Patient/Family Comments/goals: go home    Occupational Profile:  Living Environment: Pt lives with wife in a house with no LOBO and has a tub/shower combo.  Previous level of function: Independent, drives, retired  Roles and Routines: Watch TV, go out  Equipment Used at home: none  Assistance upon Discharge: Available    Pain/Comfort:  Pain Rating 1: 10/10  Location - Side 1: Right  Location - Orientation 1: generalized  Location 1: hand  Pain Addressed 1: Nurse notified  Pain Rating Post-Intervention 1: 10/10    Patients cultural, spiritual, Anabaptism conflicts given the current situation: no    Objective:     Communicated with: RN prior to session.  Patient found HOB elevated with  (no active lines) upon OT entry to room.    General Precautions: Standard, fall  Orthopedic Precautions:  (R hand WBAT, ROMAT)  Braces: N/A  Respiratory Status: Room air     Occupational Performance:    Bed Mobility:    Patient completed Supine to Sit with independence  Patient completed Sit to Supine  with independence    Functional Mobility/Transfers:  Patient completed Sit <> Stand Transfer with independence  with  no assistive device   Patient completed Bed <> Chair Transfer using Step Transfer technique with independence with no assistive device  Patient completed Toilet Transfer Step Transfer technique with independence with  no AD  Functional Mobility: Independent    Activities of Daily Living:  Feeding:  modified independence    Grooming: modified independence and Setup A as needed  Upper Body Dressing: modified independence    Lower Body Dressing: minimum assistance    Toileting: modified independence      Cognitive/Visual Perceptual:  Cognitive/Psychosocial Skills:     -       Oriented to: Person, Place, Time, and Situation   -       Follows Commands/attention:Follows multistep  commands  -       Safety awareness/insight to disability: intact     Physical Exam:  Dominant hand:    -       R  Upper Extremity Range of Motion:     -       Right Upper Extremity: WFL  -       Left Upper Extremity: WFL  Upper Extremity Strength:    -       Right Upper Extremity: WFL  -       Left Upper Extremity: WFL   Strength:    -       Right Upper Extremity: impaired by pain and dressing  -       Left Upper Extremity: WFL  Fine Motor Coordination:    -       Impaired  Right hand thumb/finger opposition skills   and Right hand, manipulation of objects    Gross motor coordination: WFL    AMPAC 6 Click ADL:  AMPAC Total Score: 22    Treatment & Education:  Pt edu re OT role, POC and safety.  WB and ROM precautions.    Patient left HOB elevated with call button in reach and wife present    GOALS:   Multidisciplinary Problems       Occupational Therapy Goals       Not on file                    History:     Past Medical History:   Diagnosis Date    Atrial fibrillation     CAD (coronary artery disease) 2006    MI in 2006    CHF (congestive heart failure) 2017    CKD (chronic kidney disease) stage 3, GFR 30-59 ml/min       "Bhavya.  in transplanted kidney    COVID-19 2/7/2023    Diverticulosis     Hyperlipidemia     Hypertension     Keloid cicatrix     Metabolic bone disease     Other emphysema 10/1/2019    Pericarditis     S/P kidney transplant 1992    x2 (1992 & 2005),    Thrombocytopenia     Thyroid disease     Tobacco use 09/05/2014         Past Surgical History:   Procedure Laterality Date    CARDIOVERSION  04/30/15    CHOLECYSTECTOMY      COLONOSCOPY  April 20, 2011    Diverticulosis, repeat recommended in 3 yrs., repeat colonoscopy 2014 revealed 2 polyps.  He should return in 5 years.    COLONOSCOPY N/A 3/13/2020    Procedure: COLONOSCOPY;  Surgeon: Chon Casper MD;  Location: [facility] (4TH FLR);  Service: Endoscopy;  Laterality: N/A;  ok to hold coumadin x5days-see telephone encounter 2/4/20-tb    COLONOSCOPY N/A 2/4/2021    Procedure: COLONOSCOPY;  Surgeon: Chon Casper MD;  Location: [facility] (4TH FLR);  Service: Endoscopy;  Laterality: N/A;  Eliquis - per Dr. GAVIN Blunt ok to hold x 2 days and "restarted asap"- ERW  last prep "poor", [id number] ordered/ Pt requests Dr. Casper  prep ins. mailed - COVID screening on 2/1/21 PCW- ERW    COLONOSCOPY N/A 3/3/2021    Procedure: COLONOSCOPY;  Surgeon: Chon Casper MD;  Location: [facility] (4TH FLR);  Service: Endoscopy;  Laterality: N/A;  Patient with his second poor bowel pre and has poor prep today.  If patient not intersted or can't get colonoscopy tomorrow will need constipation bowel prep and will need to restart his Eliquis today.Thanks,Chon     per Dr Blunt-ok to hold Eliquis 2 days prior      COVID     CORONARY ANGIOPLASTY WITH STENT PLACEMENT  9/13/2006    KIDNEY TRANSPLANT  2005    PARATHYROIDECTOMY      TREATMENT OF CARDIAC ARRHYTHMIA N/A 3/28/2022    Procedure: CARDIOVERSION;  Surgeon: Migue Blunt MD;  Location: [facility];  Service: Cardiology;  Laterality: N/A;  AF, DCC, MAC, GP, 3 PREP*RODRIGO deferred pt 100% compliant*       Time Tracking:     OT " Date of Treatment: 08/30/23  OT Start Time: 1351  OT Stop Time: 1400  OT Total Time (min): 9 min    Billable Minutes:Evaluation 9 minutes    LAUREN Lerner  8/30/2023  Pager: [phone]

## 2023-08-30 NOTE — ASSESSMENT & PLAN NOTE
Home regimen: mycophenolate mofetil, prednisone and tacrolimus   Hold MMF due to current infection

## 2023-08-30 NOTE — ASSESSMENT & PLAN NOTE
· Patient in sinus rhythm on admit. Rate controled. Continue home Amiodarone for rhythm control and Toprol XL for rate control.  · Patient on long term anticoagulation with Apixiban as outpatient but held for surgery. Will hold for now in case any further surgery needed for right middle finger but if Ortho has no further plans for another washout then likely can resume home Apixiban in next 1-2 days.

## 2023-08-30 NOTE — ASSESSMENT & PLAN NOTE
Ibrahima Phelps is a 67 y.o. male with PMH of of CAD, atrial fibrillation (on Eliquis), heart failure, CKD, hypertension, hyperlipidemia, kidney transplant (on prograf), who presents for 5 days of worsening right middle finger swelling and pain.  Patient presented afebrile with an ESR of >120 and CRP of 17.7.  On physical exam, patient was noted to have 4/4 kanavel signs; no pain at any other joint was noted.  MRI was obtained showing fluid collection along the flexor tendon sheath concerning for flexor tenosynovitis of the right middle finger.      --IV Unasyn given in ED  --Admit to medicine service for pre-operative optimization and medical evaluation.  --Pain control per primary  --Pt marked and consented for I&D of the Right Middle Finger, case booked. Pt understands need for definitive treatment of injuries.  --Pre-operative labs and imaging obtained in ED (UA, Type and Cross, PT-INR, CBC, BMP, PreAlbumin, CXR, EKG)   --Labs: Hb 9.9, Plt 204, Cr 39, A1C 5.5, UA pending  --DVT PPx: hold chemical, FCDs at all times when not ambulating  --Nonweightbearing to the RUE    Risks and complications were discussed including but not limited to the risks of anesthetic complications, infection, wound healing complications, pain, stiffness, perioperative medical risks (cardiac, pulmonary, renal, neurologic), and death among others were discussed. No guarantees were made and the patient and family elect to proceed with I&D of the right middle finger. All questions were answered.  No guarantees were implied or stated.  Informed consent was obtained.

## 2023-08-30 NOTE — OP NOTE
OPERATIVE NOTE    DATE OF PROCEDURE:  08/30/2023    PREOPERATIVE DIAGNOSIS:   Right middle finger proximal interphalangeal joint septic arthritis  History of kidney transplant, on immunosuppression    POSTOPERATIVE DIAGNOSIS:   Right middle finger proximal interphalangeal joint septic arthritis  History of kidney transplant, on immunosuppression    PROCEDURE:   Arthrotomy right middle finger proximal interphalangeal joint with lavage for septic arthritis    SURGEON:   Martin Larsen MD    ASSISTANT:    Ming Mayer MD    ANESTHESIA:   Sedation + local    EBL:    5mL    COMPLICATIONS:  none    IMPLANTS:   none    SPECIMENS:   culture    INDICATIONS FOR PROCEDURE:  67-year-old male, kidney transplant, heart failure, 5 days right middle finger pain, swelling.       Exam right hand with pain overlying the right middle finger proximal interphalangeal joint, swelling, some redness, pain with axial loading micro motion     MRI with effusion within this joint      Diagnosis right middle finger proximal interphalangeal joint septic arthritis      Discussed diagnosis   Discussed non operative versus operative management options      The risks, benefits, and alternatives to surgery were discussed with the patient and/or family.    Specific risks discussed included, but were not limited to: recurrent infection, finger stiffness, trigger finger, arthritis, damage to nearby structures, including neurovascular structures leading to loss of function or loss of limb, bleeding, need for blood transfusion, pain, stiffness, scarring, numbness, tingling, weakness, compartment syndrome, malunion/nonunion, infection, need for multiple staged procedures, prolonged antibiotics, iatrogenic fracture, heterotopic ossification, arthritis, a variety of medical complications including but not limited to heart attack, stroke, deep venous thrombosis, pulmonary embolism, prolonged hospitalization, prolonged intubation, and death.    Patient and/or family expressed an understanding and desires to proceed with surgery.   All questions were answered.  No guarantees were implied or stated.  Informed consent was obtained.      Plan for I&D right middle finger proximal interphalangeal joint, followed by antibiotics per Infectious Disease       OPERATIVE PROCEDURE:  Patient met in the preoperative hold area and the correct site and side of surgery being the right upper extremity were marked and verified.  Patient brought back to the operative suite.  Patient was sedated.  Patient transferred over to operative table.  Placed in supine position. All bony prominences were appropriately padded.  Patient received 2 g Ancef for antibiotics after surgical cultures were taken.  The right upper extremity was then prepped and draped in normal sterile fashion.    Time-out was performed verifying the correct patient, site/side of surgery, surgical consent, radiographs as applicable, preop antibiotics, necessary equipment, anticipated blood loss, length of procedure, postoperative disposition.      Arm was elevated, and tourniquet was inflated at 250 mmHg for approximately 30 minutes during the case.    Turned our attention to arthrotomy of right middle finger interphalangeal joint.  We made a dorsal incision, longitudinal fashion overlying the right middle finger proximal interphalangeal joint.  Skin incised with scalpel.  Sharply and bluntly dissected down to the extensor tendons.  Ulnarly we made a split through the lateral extensor complex, which gave us access to the joint.  This was opened, there was some murky fluid that was expressed.  This was sent for culture.  We visualized the cartilage of the proximal interphalangeal joint.    Irrigated 6 L saline    Hemostasis achieved as needed with electrocautery.  Fascia closed with 2-0 PDS.  Skin closed with 3-0 nylon.  Xeroform, gauze, cast padding, Coban dressing applied.    At the conclusion of procedure the  patient had soft and compressible compartments, brisk cap refill, palpable radial pulse in the operative extremity.    Prior to final closure all counts were confirmed to be correct.  Patient tolerated the procedure well without any complications, was awoken from anesthesia, transferred PACU for further recovery.    POSTOPERATIVE PLAN:  67-year-old male, renal transplant, on immunosuppressants, right middle finger proximal interphalangeal joint septic arthritis    08/30/2023 - I&D/arthrotomy right MF PIPJ    Abx per ID  WBAT, ROMAT RUE  Medical management per primary team  Ambulation for DVT prophylaxis    X-rays at subsequent followups:  none    Follow-up postop 2 weeks, 6 weeks, then prn    =====================  Martin Larsen MD  Orthopaedic Surgery

## 2023-08-30 NOTE — ASSESSMENT & PLAN NOTE
Pyogenic arthritis of right hand  - Orthopedic surgery consulted on admit for right middle finger tenosynovitis. Patient taken to OR with Ortho on 8/30 and underwent arthrotomy of right middle finger proximal interphalangeal joint with lavage for septic arthritis. There was some murky fluid that was expressed from right middle finger. Fluid sent for cultures.  - Infectious Disease consulted and patient started on empiric antibiotics with IV Daptomycin and Ceftriaxone. Follow-up surgical wound culture results.   - Surgical site wound care as per Ortho.  - Start Tylenol 1000 mg po every 8 hours + Robaxin 500 mg po 4 times daily and Oxycodone IR 5 mg po every 4 hours prn for breakthrough pain for pain control.

## 2023-08-30 NOTE — CONSULTS
"[facility] - Emergency Dept  Orthopedics  Consult Note    Patient Name: Ibrahima Phelps  MRN: [id number]  Admission Date: 8/29/2023  Hospital Length of Stay: 0 days  Attending Provider: Tierney Powell MD  Primary Care Provider: Anatoliy Colindres MD    Patient information was obtained from patient and ER records.     Inpatient consult to Orthopedic Surgery  Consult performed by: INDIGO Francois MD  Consult ordered by: Amy Briceño PA-C        Subjective:     Principal Problem:<principal problem not specified>    Chief Complaint:   Chief Complaint   Patient presents with    Joint Swelling     R middle finger, "something bit me I guess"        HPI: Patient is a 67-year-old male with a past medical history of CAD, atrial fibrillation (on Eliquis), heart failure, CKD, hypertension, hyperlipidemia, kidney transplant (on prograf), who presents for right middle finger pain and swelling that began 5 days prior to presentation.  States symptoms have progressively worsened since onset and he now is unable to move the finger without severe pain.  He denies any recent trauma or open wounds at his right hand and has never had a similar episode previously.  Denies any fevers/chills or any other musculoskeletal pains at this time.  He is right-hand dominant, lives at home with his wife, and smokes around half a pack of cigarettes daily.  Not on any anticoagulation at baseline.      Past Medical History:   Diagnosis Date    Atrial fibrillation     CAD (coronary artery disease) 2006    MI in 2006    CHF (congestive heart failure) 2017    CKD (chronic kidney disease) stage 3, GFR 30-59 ml/min     Dr. Latif.  in transplanted kidney    COVID-19 2/7/2023    Diverticulosis     Hyperlipidemia     Hypertension     Keloid cicatrix     Metabolic bone disease     Other emphysema 10/1/2019    Pericarditis     S/P kidney transplant 1992    x2 (1992 & 2005),    Thrombocytopenia     Thyroid disease     Tobacco use " "09/05/2014       Past Surgical History:   Procedure Laterality Date    CARDIOVERSION  04/30/15    CHOLECYSTECTOMY      COLONOSCOPY  April 20, 2011    Diverticulosis, repeat recommended in 3 yrs., repeat colonoscopy 2014 revealed 2 polyps.  He should return in 5 years.    COLONOSCOPY N/A 3/13/2020    Procedure: COLONOSCOPY;  Surgeon: Chon Casper MD;  Location: [facility] (4TH FLR);  Service: Endoscopy;  Laterality: N/A;  ok to hold coumadin x5days-see telephone encounter 2/4/20-tb    COLONOSCOPY N/A 2/4/2021    Procedure: COLONOSCOPY;  Surgeon: Chon Casper MD;  Location: [facility] (4TH FLR);  Service: Endoscopy;  Laterality: N/A;  Eliquis - per Dr. GAVIN Blunt ok to hold x 2 days and "restarted asap"- ERW  last prep "poor", [id number] ordered/ Pt requests Dr. Casper  prep ins. mailed - COVID screening on 2/1/21 PCW- ERW    COLONOSCOPY N/A 3/3/2021    Procedure: COLONOSCOPY;  Surgeon: Chon Casper MD;  Location: [facility] (4TH FLR);  Service: Endoscopy;  Laterality: N/A;  Patient with his second poor bowel pre and has poor prep today.  If patient not intersted or can't get colonoscopy tomorrow will need constipation bowel prep and will need to restart his Eliquis today.Thanks,Chon Blunt-ok to hold Eliquis 2 days prior      COVID     CORONARY ANGIOPLASTY WITH STENT PLACEMENT  9/13/2006    KIDNEY TRANSPLANT  2005    PARATHYROIDECTOMY      TREATMENT OF CARDIAC ARRHYTHMIA N/A 3/28/2022    Procedure: CARDIOVERSION;  Surgeon: Migue Blunt MD;  Location: [facility] EP LAB;  Service: Cardiology;  Laterality: N/A;  AF, DCC, MAC, GP, 3 PREP*RODRIGO deferred pt 100% compliant*       Review of patient's allergies indicates:   Allergen Reactions    Ace inhibitors Swelling    Verapamil Other (See Comments)     Other reaction(s): Unknown    Povidone-iodine Itching       Current Facility-Administered Medications   Medication    HYDROcodone-acetaminophen 5-325 mg per tablet 1 tablet     Current Outpatient " Medications   Medication Sig    amiodarone (PACERONE) 200 MG Tab Take 1 tablet (200 mg total) by mouth 2 (two) times daily for 14 days, THEN 1 tablet (200 mg total) once daily.    apixaban (ELIQUIS) 5 mg Tab Take 1 tablet (5 mg total) by mouth 2 (two) times daily.    aspirin (ECOTRIN) 81 MG EC tablet Take 1 tablet by mouth Daily.    atorvastatin (LIPITOR) 10 MG tablet TAKE 1 TABLET BY MOUTH EVERY DAY    b complex vitamins (B COMPLEX-VITAMIN B12) tablet Take 1 tablet by mouth once daily.    calcium carbonate (CALCIUM 600 ORAL) Take by mouth. Pt taking 1200 daily    doxazosin (CARDURA) 4 MG tablet Take 1 tablet (4 mg total) by mouth every 12 (twelve) hours.    famotidine (PEPCID) 20 MG tablet Take 1 tablet (20 mg total) by mouth 2 (two) times daily.    furosemide (LASIX) 40 MG tablet Take 20 mg by mouth 2 (two) times daily.    hydrALAZINE (APRESOLINE) 50 MG tablet TAKE 1 TABLET (50 MG TOTAL) BY MOUTH 3 (THREE) TIMES DAILY.    metoprolol succinate (TOPROL-XL) 25 MG 24 hr tablet Take 1 tablet (25 mg total) by mouth once daily.    MITIGARE 0.6 mg Cap TAKE 1 CAPSULE BY MOUTH TWICE A DAY AS NEEDED GOUT FLARE UP TO 3 DAYS    multivitamin (THERAGRAN) per tablet Take 1 tablet by mouth Daily.    NIFEdipine (PROCARDIA-XL) 60 MG (OSM) 24 hr tablet Take 1 tablet (60 mg total) by mouth 2 (two) times a day.    omeprazole (PRILOSEC) 20 MG capsule Take 1 capsule (20 mg total) by mouth once daily.    paricalcitoL (ZEMPLAR) 1 MCG capsule TAKE 1 CAPSULE ON MONDAY, WEDNESDAY AND FRIDAY    predniSONE (DELTASONE) 5 MG tablet TAKE 1 TABLET BY MOUTH EVERY DAY IN THE MORNING    pulse oximeter (PULSE OXIMETER) device by Apply Externally route 2 (two) times a day. Use twice daily at 8 AM and 3 PM and [facility] the [facility] as directed.    tacrolimus (PROGRAF) 1 MG Cap Take 2 capsules (2 mg total) by mouth every morning AND 2 capsules (2 mg total) every evening.    vitamin D 1000 units Tab Take 370 mg by mouth 2 (two)  times daily. 2 tablets BID    acetaminophen (TYLENOL) 500 MG tablet Take 1 tablet (500 mg total) by mouth every 6 (six) hours as needed for Pain.    albuterol (PROVENTIL) 2.5 mg /3 mL (0.083 %) nebulizer solution Take 3 mLs (2.5 mg total) by nebulization every 6 (six) hours as needed for Wheezing. Rescue    albuterol (VENTOLIN HFA) 90 mcg/actuation inhaler Inhale 2 puffs into the lungs every 6 (six) hours as needed for Wheezing or Shortness of Breath. Rescue    ammonium lactate 12 % Crea Apply 1 g topically once daily.    fluticasone propion-salmeterol 115-21 mcg/dose (ADVAIR HFA) 115-21 mcg/actuation HFAA inhaler Inhale 2 puffs into the lungs every 12 (twelve) hours. Controller (Patient taking differently: Inhale 2 puffs into the lungs as needed. Controller)    fluticasone propionate (FLONASE) 50 mcg/actuation nasal spray 1 spray (50 mcg total) by Each Nostril route once daily. (Patient taking differently: 1 spray by Each Nostril route as needed.)    gabapentin (NEURONTIN) 100 MG capsule Take 1 capsule (100 mg total) by mouth every evening. 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     Patient is currently retired   Substance and Sexual Activity    Alcohol use: No    Drug use: No    Sexual activity: Yes     Partners: Female     ROS  Constitutional: Denies fever/chills  Eyes: Denies  change in vision  ENT: Denies sore throat or rhinorrhea   Respiratory: Denies shortness of breath or cough  Cardiovascular: Denies chest pain or palpitations  Gastrointestinal: Denies abdominal pain, nausea, or vomiting  Genitourinary: Denies dysuria and flank pain  Skin: Denies new rash or skin lesions   Allergic/Immunologic: Denies adverse reactions to current medications  Neurological: Denies headaches or dizziness  Musculoskeletal: see HPI    Objective:     Vital Signs (Most Recent):  Temp: 97.9 °F (36.6 °C) (08/29/23 1240)  Pulse: 75 (08/29/23 1240)  Resp: 16 (08/29/23 1240)  BP: 138/61 (08/29/23 1241)  SpO2: 99 % (08/29/23 1240) Vital Signs (24h Range):  Temp:  [97.9 °F (36.6 °C)] 97.9 °F (36.6 °C)  Pulse:  [75] 75  Resp:  [16] 16  SpO2:  [99 %] 99 %  BP: (138)/(61) 138/61     Weight: 95.3 kg (210 lb)     Body mass index is 27.71 kg/m².    No intake or output data in the 24 hours ending 08/29/23 1511     Ortho/SPM Exam  Physical Exam:  General:  no apparent distress, WDWN  HENT:  NCAT, Bilateral ears/eyes normal  CV:  Normal pulses, color, and cap refill  Lungs:  Normal respiratory effort  Neuro: No FND, awake, alert  Psych:  Oriented to Person, Place, Time, and Situation    MSK:       LUE:  Inspection: Skin intact throughout, no swelling, no effusions, no ecchymosis   Palpation: Non-TTP throughout, no palpable abnormality.   ROM: AROM and PROM of the shoulder, elbow, wrist, and hand intact without pain.   Neuro: AIN/PIN/Radial/Median/Ulnar Nerves assessed in isolation without deficit.   SILT throughout.    Vascular: Radial artery palpated 2+. Capillary refill <3s.         RUE:  Inspection: Skin intact throughout, no open wounds  Fusiform swelling of the middle finger  No spreading erythema or signs of overlying cellulitis   Palpation: TTP along the flexor tendon sheath of the middle finger; otherwise non-TTP throughout.  Compartments of the hand are soft and compressible; no tenderness to palpation of the  "palm.   ROM: AROM and PROM of the shoulder, elbow, wrist intact without pain.  Active and passive range of motion at the MF DIPJ and PIPJ limited 2/2 pain  Pain with passive extension of the MF  Full painless active and passive ROM at the 3rd MCPJ   Neuro: AIN/PIN/Radial/Median/Ulnar Nerves assessed in isolation without deficit.  Able to give thumbs up, make "OK" sign, cross IF/LF, abduct/adduct fingers  SILT M/U/R nerve distributions.    Vascular: Radial artery palpated 2+. Capillary refill <3s.               LLE:  Inspection: Skin intact throughout, no swelling, no effusions, no ecchymosis   Palpation: Non-TTP throughout. No palpable abnormality.   ROM: AROM and PROM of the hip, knee, ankle, and foot intact without pain.   Neuro: TA/EHL/Gastroc/FHL assessed in isolation without deficit.   SILT throughout.    Vascular: Foot is WWP. Capillary refill <3s.         RLE:  Inspection: Skin intact throughout, no swelling, no effusions, no ecchymosis   Palpation: Non-TTP throughout. No palpable abnormality.   ROM: AROM and PROM of the hip, knee, ankle, and foot intact without pain.   Neuro: TA/EHL/Gastroc/FHL assessed in isolation without deficit.   SILT throughout.    Vascular: Foot is WWP. Capillary refill <3s.        Spine/pelvis/axial body:  No tenderness to palpation of cervical, thoracic, or lumbar spine     Significant Labs: CRP:   Recent Labs   Lab 08/29/23  1556   CRP 17.7*     All pertinent labs within the past 24 hours have been reviewed.    Significant Imaging: I have reviewed and interpreted all pertinent imaging results/findings.  MRI of the right hand and fingers showing fluid collection along the middle finger flexor tendon sheath, an effusion at the PIPJ, and circumferential edema most prominent at the proximal and middle phalanx of the MF    Assessment/Plan:     Flexor tenosynovitis of right middle finger  Ibrahima Phelps is a 67 y.o. male with PMH of of CAD, atrial fibrillation (on Eliquis), heart failure, " CKD, hypertension, hyperlipidemia, kidney transplant (on prograf), who presents for 5 days of worsening right middle finger swelling and pain.  Patient presented afebrile with an ESR of >120 and CRP of 17.7.  On physical exam, patient was noted to have 4/4 kanavel signs; no pain at any other joint was noted.  MRI was obtained showing fluid collection along the flexor tendon sheath concerning for flexor tenosynovitis of the right middle finger.      --IV Unasyn given in ED  --Admit to medicine service for pre-operative optimization and medical evaluation.  --Pain control per primary  --Pt marked and consented for I&D of the Right Middle Finger, case booked. Pt understands need for definitive treatment of injuries.  --Pre-operative labs and imaging obtained in ED (UA, Type and Cross, PT-INR, CBC, BMP, PreAlbumin, CXR, EKG)   --Labs: Hb 9.9, Plt 204, Cr 39, A1C 5.5, UA pending  --DVT PPx: hold chemical, FCDs at all times when not ambulating  --Nonweightbearing to the RUE    Risks and complications were discussed including but not limited to the risks of anesthetic complications, infection, wound healing complications, pain, stiffness, perioperative medical risks (cardiac, pulmonary, renal, neurologic), and death among others were discussed. No guarantees were made and the patient and family elect to proceed with I&D of the right middle finger. All questions were answered.  No guarantees were implied or stated.  Informed consent was obtained.            INDIGO Francois MD  Orthopedics  [facility] - Emergency Dept

## 2023-08-30 NOTE — SUBJECTIVE & OBJECTIVE
Principal Problem:Flexor tenosynovitis right middle of finger    Principal Orthopedic Problem: as above    Interval History: Afebrile, VSS, NAEON.  Pain has been reportedly well controlled, but patient reports he feels his swelling has worsened.  AM labs w/ Hb 8.0 from 9.9.  NPO since midning and planning for I&D this morning.        Review of patient's allergies indicates:   Allergen Reactions    Ace inhibitors Swelling    Verapamil Other (See Comments)     Other reaction(s): Unknown    Povidone-iodine Itching       Current Facility-Administered Medications   Medication    acetaminophen tablet 650 mg    albuterol inhaler 2 puff    aluminum-magnesium hydroxide-simethicone 200-200-20 mg/5 mL suspension 30 mL    amiodarone tablet 200 mg    ampicillin-sulbactam (UNASYN) 3 g in sodium chloride 0.9 % 100 mL IVPB (MB+)    atorvastatin tablet 10 mg    dextrose 10% bolus 125 mL 125 mL    dextrose 10% bolus 250 mL 250 mL    doxazosin tablet 4 mg    furosemide tablet 20 mg    glucagon (human recombinant) injection 1 mg    glucose chewable tablet 16 g    glucose chewable tablet 24 g    hydrALAZINE tablet 50 mg    melatonin tablet 6 mg    metoprolol succinate (TOPROL-XL) 24 hr tablet 25 mg    naloxone 0.4 mg/mL injection 0.02 mg    NIFEdipine 24 hr tablet 60 mg    ondansetron injection 4 mg    oxyCODONE immediate release tablet 5 mg    pantoprazole EC tablet 40 mg    polyethylene glycol packet 17 g    predniSONE tablet 5 mg    sodium chloride 0.9% flush 10 mL    tacrolimus capsule 2 mg     Objective:     Vital Signs (Most Recent):  Temp: 97.9 °F (36.6 °C) (08/30/23 0517)  Pulse: 61 (08/30/23 0517)  Resp: 18 (08/30/23 0517)  BP: (!) 160/76 (08/30/23 0517)  SpO2: 96 % (08/30/23 0517) Vital Signs (24h Range):  Temp:  [96.4 °F (35.8 °C)-97.9 °F (36.6 °C)] 97.9 °F (36.6 °C)  Pulse:  [60-76] 61  Resp:  [16-18] 18  SpO2:  [93 %-99 %] 96 %  BP: (138-160)/(61-76) 160/76     Weight: 95.3 kg (210 lb)     Body mass index is 27.71  kg/m².      Intake/Output Summary (Last 24 hours) at 8/30/2023 0701  Last data filed at 8/29/2023 1744  Gross per 24 hour   Intake 500 ml   Output --   Net 500 ml        Ortho/SPM Exam  AAOx4  NAD  Reg rate  No increased WOB    RUE:  Swelling over dorsum of PIPJ at MF worsened  No open wounds  FROM shoulder, elbow, and wrist  SILT M/U/R  Motor intact AIN/PIN/M/U/R  WWP extremities      Significant Labs: All pertinent labs within the past 24 hours have been reviewed.    Significant Imaging: I have reviewed all pertinent imaging results/findings.

## 2023-08-30 NOTE — ASSESSMENT & PLAN NOTE
- Orthopedic surgery consulted ; appreciate recs  - start iv unasyn  - holding home ASA and Eliquis  ;  Resume as appropriate  - follow up MRI  - npo midnight with plan for OR in am  - follow up cultures  - further management pending clinical course and future study review

## 2023-08-30 NOTE — CONSULTS
[facility] - Surgery  Kidney Transplant  Progress Note      Reason for Follow-up: Reassessment of Kidney Transplant - 4/12/2005 - Not Followed  (#2) recipient and management of immunosuppression.        Subjective:   History of Present Illness:  Mr. Phelps is a 67 y.o. year old Black male with history of DDRT in July 1992 at [facility] which failed secondary to rejection and he resumed dialysis in April 1996 until receiving DDRT #2 on 4/12/05. He had transplant nephrectomy of DDRT #1. His ibaseline creatinine is between 2.4-3.8 since early 2023. He takes mycophenolate mofetil, prednisone and tacrolimus for maintenance immunosuppression.       presents to the emergency department with chief complaint of right 3rd digit pain and swelling that began 5 days ago.   In the ED patient afebrile and hemodynamically stable saturating well on room air. WBC wnl. ESR and CRP elevated. Orthopedic surgery consulted and evaluated patient with high suspicion for tenosynovitis. MRI ordered, patient started on Unasyn, admitted to the care of medicine for further evaluation and management.   His Cr 3.9 . KTM consulted for CKD and IS management .                Past Medical, Surgical, Family, and Social History:   Unchanged from H&P.    Scheduled Meds:   amiodarone  200 mg Oral Daily    atorvastatin  10 mg Oral Daily    cefTRIAXone (ROCEPHIN) IVPB  2 g Intravenous Q24H    DAPTOmycin (CUBICIN) IV (PEDS and ADULTS)  8 mg/kg Intravenous Q24H    doxazosin  4 mg Oral Q12H    furosemide  20 mg Oral BID    hydrALAZINE  50 mg Oral TID    metoprolol succinate  25 mg Oral Daily    NIFEdipine  60 mg Oral BID    pantoprazole  40 mg Oral Daily    polyethylene glycol  17 g Oral Daily    predniSONE  5 mg Oral Daily    tacrolimus  2 mg Oral BID     Continuous Infusions:  PRN Meds:acetaminophen, albuterol **AND** MDI Q4H PRN, aluminum-magnesium hydroxide-simethicone, dextrose 10%, dextrose 10%, glucagon (human recombinant), glucose, glucose,  "melatonin, naloxone, ondansetron, oxyCODONE, sodium chloride 0.9%    Intake/Output - Last 3 Shifts         08/28 0700 08/29 0659 08/29 0700 08/30 0659 08/30 0700 08/31 0659    P.O.   115    IV Piggyback  500 250    Total Intake(mL/kg)  500 (5.2) 365 (3.8)    Urine (mL/kg/hr)   0 (0)    Blood   0    Total Output   0    Net  +500 +365                    Review of Systems   Constitutional:  Positive for chills. Negative for fatigue and fever.   HENT:  Negative for sore throat and trouble swallowing.    Eyes:  Negative for photophobia and visual disturbance.   Respiratory:  Negative for cough, shortness of breath and wheezing.    Cardiovascular:  Negative for chest pain, palpitations and leg swelling.   Gastrointestinal:  Negative for abdominal distention, abdominal pain, diarrhea, nausea and vomiting.   Genitourinary:  Negative for dysuria and hematuria.   Musculoskeletal:  Positive for arthralgias and joint swelling. Negative for neck pain and neck stiffness.     Objective:     Vital Signs (Most Recent):  Temp: 98.1 °F (36.7 °C) (08/30/23 1200)  Pulse: (!) 56 (08/30/23 1200)  Resp: 12 (08/30/23 1200)  BP: (!) 155/72 (08/30/23 1200)  SpO2: 98 % (08/30/23 1200) Vital Signs (24h Range):  Temp:  [96.4 °F (35.8 °C)-98.1 °F (36.7 °C)] 98.1 °F (36.7 °C)  Pulse:  [50-76] 56  Resp:  [9-24] 12  SpO2:  [93 %-100 %] 98 %  BP: (115-175)/(58-81) 155/72     Weight: 95.3 kg (210 lb)  Height: 6' 1" (185.4 cm)  Body mass index is 27.71 kg/m².     Physical Exam   Cardiovascular:      Rate and Rhythm: Normal rate. Rhythm irregular.   Pulmonary:      Effort: Pulmonary effort is normal. No respiratory distress.      Breath sounds: No wheezing or rales.   Abdominal:      General: Abdomen is flat. There is no distension.      Palpations: Abdomen is soft.      Tenderness: There is no abdominal tenderness. There is no guarding.   Musculoskeletal:            Right lower leg: No edema.      Left lower leg: No edema.      Comments:  right 3rd " digit in dressing.    Laboratory:  Labs within the past 24 hours have been reviewed.        Assessment/Plan:     Acute renal failure superimposed on stage 4 chronic kidney disease  His baseline creatinine is between 2.4-3.8 since early 2023.  Cr 3.9 upon arrival  Avoid nephrotoxins, volume shifts, aim for BP within target to prevent additional renal injury  Renally dose all appropriate medications, including antibiotics  Strict I/O  pending kidney US      Immunodeficiency due to treatment with immunosuppressive medication  Home regimen: mycophenolate mofetil, prednisone and tacrolimus   Hold MMF due to current infection  Continue prograf and prednisone  Check prograf level to avoid toxicity      H/O kidney transplant  DDRT #1 in July 1992 at [facility] which failed secondary to rejection. He had transplant nephrectomy of DDRT #1  DDRT #2 on 4/12/05       Pyogenic arthritis of right hand  ID and Orthopedic teams on board  On Antibiotics  S/P procedure today             Amy Bryant MD  Kidney Transplant  [facility] - Surgery

## 2023-08-30 NOTE — PROGRESS NOTES
[facility] Medicine  Progress Note    Patient Name: Ibrahima Phelps  MRN: [id number]  Patient Class: IP- Inpatient   Admission Date: 8/29/2023  Length of Stay: 1 days  Attending Physician: Tierney Powell MD  Primary Care Provider: Anatoliy Colindres MD        Subjective:     Principal Problem:Flexor tenosynovitis of finger        HPI:  67-year-old male with past medical history of CAD, atrial fibrillation, heart failure, CKD, hypertension, hyperlipidemia, kidney transplant on immunosuppressive therapy who presents to the emergency department with chief complaint of right 3rd digit pain and swelling that began 5 days ago.  It has progressively worsened since then.  He has pain with movement of the finger.  He denies trauma or injury.  No insect bite that he is aware of.  He denies symptoms of this nature in the past.  No paresthesias.  No fever, chills, nausea, vomiting.  Denies other worsening or alleviating factors.    In the ED patient afebrile and hemodynamically stable saturating well on room air. WBC wnl. ESR and CRP elevated. Orthopedic surgery consulted and evaluated patient with high suspicion for tenosynovitis. MRI ordered, patient started on Unasyn, admitted to the care of medicine for further evaluation and management.       Overview/Hospital Course:  MRI confirmed flexor tenosynovitis of right middle finger. Ortho consulted and patient taken to OR on 8/30/202 by Dr. Martin Larsen and underwent arthrotomy of right middle finger proximal interphalangeal joint with lavage for septic arthritis there was some murky fluid that was expressed from right middle finger. Fluid sent for cultures. Infectious Disease consulted to assist in antibiotic management. Kidney transplant medicine consulted on admit as patient having increased creatinine above baseline. Patient known kidney transplant patient with post transplant CKD stage 4 with creatinine 2.5-3.1 but creatinine 3.8 on admit.        Interval History: Ortho consulted for right middle finger tenosynovitis and patient taken to OR this am by Dr. Martin Larsen and underwent arthrotomy of right middle finger proximal interphalangeal joint with lavage for septic arthritis. There was some murky fluid that was expressed from right middle finger. Fluid sent for cultures. Infectious Disease consulted to assist in antibiotic management. Kidney transplant medicine consulted also on admit as patient having increased creatinine above baseline. Patient known kidney transplant patient with post transplant CKD stage 4 with creatinine 2.5-3.2 but creatinine 3.8 on admit. Patient has had readings this high recently but want KTM to come and see patients. Patient back from surgery and reports numbness to right middle finger and got nerve block to his right hand for surgery this am so would expect right hand and middle finger to be numb.     Review of Systems   Constitutional:  Negative for fever.   Respiratory:  Negative for cough and shortness of breath.    Cardiovascular:  Negative for chest pain.   Gastrointestinal:  Negative for abdominal pain, diarrhea and nausea.   Musculoskeletal:  Positive for arthralgias (Right middle finger).   Neurological:  Negative for dizziness.   Psychiatric/Behavioral:  Negative for agitation and confusion.      Objective:     Vital Signs (Most Recent):  Temp: 98.1 °F (36.7 °C) (08/30/23 1200)  Pulse: 56 (08/30/23 1200)  Resp: 12 (08/30/23 1200)  BP: 155/72 (08/30/23 1200)  SpO2: 98 % (08/30/23 1200) on room air Vital Signs (24h Range):  Temp:  [96.4 °F (35.8 °C)-98.1 °F (36.7 °C)] 98.1 °F (36.7 °C)  Pulse:  [50-76] 56  Resp:  [9-24] 12  SpO2:  [93 %-100 %] 98 %  BP: (115-175)/(58-81) 155/72     Weight: 95.3 kg (210 lb)  Body mass index is 27.71 kg/m².    Intake/Output Summary (Last 24 hours) at 8/30/2023 1417  Last data filed at 8/30/2023 1030  Gross per 24 hour   Intake 865 ml   Output 0 ml   Net 865 ml          Physical Exam  Vitals and nursing note reviewed.   Constitutional:       General: He is awake. He is not in acute distress.     Appearance: Normal appearance. He is normal weight. He is not ill-appearing.   Cardiovascular:      Rate and Rhythm: Normal rate and regular rhythm.      Heart sounds: Normal heart sounds. No murmur heard.     No friction rub. No gallop.   Pulmonary:      Effort: Pulmonary effort is normal. No respiratory distress.      Breath sounds: Normal breath sounds. No wheezing.   Abdominal:      General: Abdomen is flat. Bowel sounds are normal. There is no distension.      Palpations: Abdomen is soft.      Tenderness: There is no abdominal tenderness.   Musculoskeletal:      Right lower leg: No edema.      Left lower leg: No edema.   Skin:     Findings: No erythema.      Comments: Right hand and right middle finger wrapped in surgical bandage   Neurological:      Mental Status: He is alert and oriented to person, place, and time.   Psychiatric:         Mood and Affect: Mood normal.         Behavior: Behavior normal. Behavior is cooperative.         Thought Content: Thought content normal.         Judgment: Judgment normal.             Significant Labs: CBC:   Recent Labs   Lab 08/29/23  1556 08/30/23  0428   WBC 8.17 7.07   HGB 9.9* 8.0*   HCT 31.8* 25.6*    171     CMP:   Recent Labs   Lab 08/29/23  1556 08/30/23  0428    142   K 4.2 4.1    112*   CO2 19* 20*   GLU 95 80   BUN 25* 28*   CREATININE 3.9* 3.8*   CALCIUM 8.9 8.1*   PROT 7.5 5.9*   ALBUMIN 3.2* 2.6*   BILITOT 0.3 0.3   ALKPHOS 78 54*   AST 23 19   ALT 18 12   ANIONGAP 11 10     Sed Rate   Date Value Ref Range Status   08/29/2023 >120 (H) 0 - 23 mm/Hr Final     CRP   Date Value Ref Range Status   08/29/2023 17.7 (H) 0.0 - 8.2 mg/L Final     Recent Labs     08/30/23  0428   TACROLIMUS 4.6*        Significant Imaging: I have reviewed all pertinent imaging results/findings within the past 24  hours.      Assessment/Plan:      * Flexor tenosynovitis of right middle finger  Pyogenic arthritis of right hand  - Orthopedic surgery consulted on admit for right middle finger tenosynovitis. Patient taken to OR with Ortho on 8/30 and underwent arthrotomy of right middle finger proximal interphalangeal joint with lavage for septic arthritis. There was some murky fluid that was expressed from right middle finger. Fluid sent for cultures.  - Infectious Disease consulted and patient started on empiric antibiotics with IV Daptomycin and Ceftriaxone. Follow-up surgical wound culture results.   - Surgical site wound care as per Ortho.  - Start Tylenol 1000 mg po every 8 hours + Robaxin 500 mg po 4 times daily and Oxycodone IR 5 mg po every 4 hours prn for breakthrough pain for pain control.     Primary hypertension  Chronic and controlled. Continue home Cardura, Nifedipine, Hydralazine and Metoprolol to treat. Monitor vital signs every 4 hours. Target BP < 140/90.       Chronic diastolic CHF (congestive heart failure)  Patient is identified as having Diastolic (HFpEF) heart failure that is Chronic. CHF is currently controlled. Latest ECHO performed and demonstrates- Results for orders placed during the hospital encounter of 02/07/23    Echo    Interpretation Summary  · The left ventricle is normal in size with normal systolic function. The estimated ejection fraction is 65%.  · Normal right ventricular size with normal right ventricular systolic function.  · Grade II left ventricular diastolic dysfunction.  · Severe left atrial enlargement.  · There is mild aortic valve stenosis. Aortic valve area is 1.8 cm2; peak velocity is 2.9 m/s; mean gradient is 15 mmHg.  · Pulmonary HTN - the estimated PA systolic pressure is > 41mm Hg. (The IVC was not well visualized.)  Continue home Lasix, Toprol XL and Hydralazine to treat.  Monitor clinical status closely. Monitor on telemetry. Patient is off CHF pathway.  Monitor strict  Is&Os and daily weights.  Place on fluid restriction of 1.5 L. Continue to stress to patient importance of self efficacy and  on diet for CHF.    Acute renal failure superimposed on stage 4 chronic kidney disease  · Patient's baseline creatinine 2.7-3.2 consistent with CKD stage 4 but creatinine 3.8 on admit. Patient has had creatinines this high in past 1 year. Patient with previous kidney transplant and last in 2005.   · Will obtain renal ultrasound of kidney transplant to evaluate.   · Consult Kidney Transplant medicine.   · Strict I+Os to closely monitor urine output.   · Avoid any nephrotoxic agents and meds.   · Renally adjust all medications based on GFR.   · Monitor daily BMP to follow renal function.     Anemia in stage 4 chronic kidney disease  · Patient with known chronic anemia due to his advanced kidney disease. Baseline Hgb 9-10.  · Hgb 8.0 on 8/30.  · Patient has no signs of active bleeding and hemodynamically stable. Patient is asymptomatic related to anemia.   · Goal is keep Hgb >7 and consider blood transfusion if Hgb < 7 and asymptomatic or < 8 if patient becomes symptomatic.  · Plan is to monitor daily CBC.    H/O kidney transplant  Immunodeficiency due to treatment with immunosuppressive medication  - Continue home Tacrolimus and Prednisone to treat for chronic immunosuppression.   - Monitor daily Tacrolimus levels in hospital.   - Kidney Transplant Medicine team consulted to assist in immunosuppression.       Paroxysmal atrial fibrillation  · Patient in sinus rhythm on admit. Rate controled. Continue home Amiodarone for rhythm control and Toprol XL for rate control.  · Patient on long term anticoagulation with Apixiban as outpatient but held for surgery. Will hold for now in case any further surgery needed for right middle finger but if Ortho has no further plans for another washout then likely can resume home Apixiban in next 1-2 days.     Mixed hyperlipidemia  Chronic and controlled.  Continue home Lipitor 10 mg po daily to treat.       Other emphysema  · Chronic and controlled. Patient on Advair MDI diskus at home to treat with Duonebs and Albuterol MDI inhaler prn.  · No signs of exacerbation.  · Patient not on home oxygen.   · Advair not on formulary so will substitute with Breo inhaler 1 puff daily to treat.       VTE Risk Mitigation (From admission, onward)         Ordered     IP VTE HIGH RISK PATIENT  Once         08/29/23 1827     Place sequential compression device  Until discontinued         08/29/23 1827                Discharge Planning   RAMU: 9/1/2023     Code Status: Full Code   Is the patient medically ready for discharge?: No    Reason for patient still in hospital (select all that apply): Patient trending condition           Tierney Powell MD  Department of Hospital Medicine   [facility] - Surgery

## 2023-08-30 NOTE — HOSPITAL COURSE
MRI confirmed flexor tenosynovitis of right middle finger. Ortho consulted and patient taken to OR on 8/30/202 by Dr. Martin Larsen and underwent arthrotomy of right middle finger proximal interphalangeal joint with lavage for septic arthritis there was some murky fluid that was expressed from right middle finger. Fluid sent for cultures. Infectious Disease consulted to assist in antibiotic management. Kidney transplant medicine consulted on admit as patient having increased creatinine above baseline. Patient known kidney transplant patient with post transplant CKD stage 4 with creatinine 2.5-3.1 but creatinine 3.8 on admit.

## 2023-08-30 NOTE — PROGRESS NOTES
[facility] - Surgery  Orthopedics  Progress Note    Patient Name: Ibrahima Phelps  MRN: [id number]  Admission Date: 8/29/2023  Hospital Length of Stay: 1 days  Attending Provider: Tierney Powell MD  Primary Care Provider: Anatoliy Colindres MD  Follow-up For: Procedure(s) (LRB):  IRRIGATION AND DEBRIDEMENT, RIGHT MIDDLE FINGER; Cysto tubing, hand table (Right)    Post-Operative Day: Day of Surgery  Subjective:     Principal Problem:Flexor tenosynovitis right middle of finger    Principal Orthopedic Problem: as above    Interval History: Afebrile, VSS, NAEON.  Pain has been reportedly well controlled, but patient reports he feels his swelling has worsened.  AM labs w/ Hb 8.0 from 9.9.  NPO since midning and planning for I&D this morning.        Review of patient's allergies indicates:   Allergen Reactions    Ace inhibitors Swelling    Verapamil Other (See Comments)     Other reaction(s): Unknown    Povidone-iodine Itching       Current Facility-Administered Medications   Medication    acetaminophen tablet 650 mg    albuterol inhaler 2 puff    aluminum-magnesium hydroxide-simethicone 200-200-20 mg/5 mL suspension 30 mL    amiodarone tablet 200 mg    ampicillin-sulbactam (UNASYN) 3 g in sodium chloride 0.9 % 100 mL IVPB (MB+)    atorvastatin tablet 10 mg    dextrose 10% bolus 125 mL 125 mL    dextrose 10% bolus 250 mL 250 mL    doxazosin tablet 4 mg    furosemide tablet 20 mg    glucagon (human recombinant) injection 1 mg    glucose chewable tablet 16 g    glucose chewable tablet 24 g    hydrALAZINE tablet 50 mg    melatonin tablet 6 mg    metoprolol succinate (TOPROL-XL) 24 hr tablet 25 mg    naloxone 0.4 mg/mL injection 0.02 mg    NIFEdipine 24 hr tablet 60 mg    ondansetron injection 4 mg    oxyCODONE immediate release tablet 5 mg    pantoprazole EC tablet 40 mg    polyethylene glycol packet 17 g    predniSONE tablet 5 mg    sodium chloride 0.9% flush 10 mL    tacrolimus capsule 2 mg      Objective:     Vital Signs (Most Recent):  Temp: 97.9 °F (36.6 °C) (08/30/23 0517)  Pulse: 61 (08/30/23 0517)  Resp: 18 (08/30/23 0517)  BP: (!) 160/76 (08/30/23 0517)  SpO2: 96 % (08/30/23 0517) Vital Signs (24h Range):  Temp:  [96.4 °F (35.8 °C)-97.9 °F (36.6 °C)] 97.9 °F (36.6 °C)  Pulse:  [60-76] 61  Resp:  [16-18] 18  SpO2:  [93 %-99 %] 96 %  BP: (138-160)/(61-76) 160/76     Weight: 95.3 kg (210 lb)     Body mass index is 27.71 kg/m².      Intake/Output Summary (Last 24 hours) at 8/30/2023 0701  Last data filed at 8/29/2023 1744  Gross per 24 hour   Intake 500 ml   Output --   Net 500 ml        Ortho/SPM Exam  AAOx4  NAD  Reg rate  No increased WOB    RUE:  Swelling over dorsum of PIPJ at MF worsened  No open wounds  FROM shoulder, elbow, and wrist  SILT M/U/R  Motor intact AIN/PIN/M/U/R  WWP extremities      Significant Labs: All pertinent labs within the past 24 hours have been reviewed.    Significant Imaging: I have reviewed all pertinent imaging results/findings.    Assessment/Plan:     * Flexor tenosynovitis of right middle finger  Ibrahima Phelps is a 67 y.o. male with PMH of of CAD, atrial fibrillation (on Eliquis), heart failure, CKD, hypertension, hyperlipidemia, kidney transplant (on prograf), who presents for 5 days of worsening right middle finger swelling and pain.  Patient presented afebrile with an ESR of >120 and CRP of 17.7.  On physical exam, patient was noted to have 4/4 kanavel signs; no pain at any other joint was noted.  MRI was obtained showing fluid collection along the flexor tendon sheath concerning for flexor tenosynovitis of the right middle finger.      --IV Unasyn given in ED  --Admitted to medicine service for pre-operative optimization and medical evaluation.  --Pain control per primary  --Pt marked and consented for I&D of the Right Middle Finger, case booked. Pt understands need for definitive treatment of injuries.  --Pre-operative labs and imaging obtained in ED (UA, Type  and Cross, PT-INR, CBC, BMP, PreAlbumin, CXR, EKG)   --Labs: Hb 9.9, Plt 171, Cr 3.9, A1C 5.5, UA pending  --DVT PPx: hold chemical, FCDs at all times when not ambulating  --Nonweightbearing to the RUE    »» Dispo: to OR this morning for I&D R-middle finger             INDIGO Francois MD  Orthopedics  [facility] - Surgery

## 2023-08-30 NOTE — SUBJECTIVE & OBJECTIVE
Subjective:   History of Present Illness:  Mr. Phelps is a 67 y.o. year old Black male with history of DDRT in July 1992 at [facility] which failed secondary to rejection and he resumed dialysis in April 1996 until receiving DDRT #2 on 4/12/05. He had transplant nephrectomy of DDRT #1. His ibaseline creatinine is between 2.4-3.8 since early 2023. He takes mycophenolate mofetil, prednisone and tacrolimus for maintenance immunosuppression.       presents to the emergency department with chief complaint of right 3rd digit pain and swelling that began 5 days ago.   In the ED patient afebrile and hemodynamically stable saturating well on room air. WBC wnl. ESR and CRP elevated. Orthopedic surgery consulted and evaluated patient with high suspicion for tenosynovitis. MRI ordered, patient started on Unasyn, admitted to the care of medicine for further evaluation and management.   His Cr 3.9 . KTM consulted for CKD and IS management .                Past Medical, Surgical, Family, and Social History:   Unchanged from H&P.    Scheduled Meds:   amiodarone  200 mg Oral Daily    atorvastatin  10 mg Oral Daily    cefTRIAXone (ROCEPHIN) IVPB  2 g Intravenous Q24H    DAPTOmycin (CUBICIN) IV (PEDS and ADULTS)  8 mg/kg Intravenous Q24H    doxazosin  4 mg Oral Q12H    furosemide  20 mg Oral BID    hydrALAZINE  50 mg Oral TID    metoprolol succinate  25 mg Oral Daily    NIFEdipine  60 mg Oral BID    pantoprazole  40 mg Oral Daily    polyethylene glycol  17 g Oral Daily    predniSONE  5 mg Oral Daily    tacrolimus  2 mg Oral BID     Continuous Infusions:  PRN Meds:acetaminophen, albuterol **AND** MDI Q4H PRN, aluminum-magnesium hydroxide-simethicone, dextrose 10%, dextrose 10%, glucagon (human recombinant), glucose, glucose, melatonin, naloxone, ondansetron, oxyCODONE, sodium chloride 0.9%    Intake/Output - Last 3 Shifts         08/28 0700 08/29 0659 08/29 0700 08/30 0659 08/30 0700 08/31 0659    P.O.   115    IV Piggyback  500 250     "Total Intake(mL/kg)  500 (5.2) 365 (3.8)    Urine (mL/kg/hr)   0 (0)    Blood   0    Total Output   0    Net  +500 +365                    Review of Systems   Constitutional:  Positive for chills. Negative for fatigue and fever.   HENT:  Negative for sore throat and trouble swallowing.    Eyes:  Negative for photophobia and visual disturbance.   Respiratory:  Negative for cough, shortness of breath and wheezing.    Cardiovascular:  Negative for chest pain, palpitations and leg swelling.   Gastrointestinal:  Negative for abdominal distention, abdominal pain, diarrhea, nausea and vomiting.   Genitourinary:  Negative for dysuria and hematuria.   Musculoskeletal:  Positive for arthralgias and joint swelling. Negative for neck pain and neck stiffness.     Objective:     Vital Signs (Most Recent):  Temp: 98.1 °F (36.7 °C) (08/30/23 1200)  Pulse: (!) 56 (08/30/23 1200)  Resp: 12 (08/30/23 1200)  BP: (!) 155/72 (08/30/23 1200)  SpO2: 98 % (08/30/23 1200) Vital Signs (24h Range):  Temp:  [96.4 °F (35.8 °C)-98.1 °F (36.7 °C)] 98.1 °F (36.7 °C)  Pulse:  [50-76] 56  Resp:  [9-24] 12  SpO2:  [93 %-100 %] 98 %  BP: (115-175)/(58-81) 155/72     Weight: 95.3 kg (210 lb)  Height: 6' 1" (185.4 cm)  Body mass index is 27.71 kg/m².     Physical Exam   Cardiovascular:      Rate and Rhythm: Normal rate. Rhythm irregular.   Pulmonary:      Effort: Pulmonary effort is normal. No respiratory distress.      Breath sounds: No wheezing or rales.   Abdominal:      General: Abdomen is flat. There is no distension.      Palpations: Abdomen is soft.      Tenderness: There is no abdominal tenderness. There is no guarding.   Musculoskeletal:            Right lower leg: No edema.      Left lower leg: No edema.      Comments:  right 3rd digit in dressing.    Laboratory:  Labs within the past 24 hours have been reviewed.      "

## 2023-08-30 NOTE — ASSESSMENT & PLAN NOTE
His baseline creatinine is between 2.4-3.8 since early 2023.  Cr 3.9 upon arrival  Avoid nephrotoxins, volume shifts, aim for BP within target to prevent additional renal injury  Renally dose all appropriate medications, including antibiotics  Strict I/O  pending kidney US

## 2023-08-30 NOTE — ASSESSMENT & PLAN NOTE
Immunodeficiency due to treatment with immunosuppressive medication  - Continue home Tacrolimus and Prednisone to treat for chronic immunosuppression.   - Monitor daily Tacrolimus levels in hospital.   - Kidney Transplant Medicine team consulted to assist in immunosuppression.

## 2023-08-30 NOTE — CONSULTS
[facility] - Surgery  Infectious Disease  Consult Note    Patient Name: Ibrahima Phelps  MRN: [id number]  Admission Date: 8/29/2023  Hospital Length of Stay: 1 days  Attending Physician: Tierney Powell MD  Primary Care Provider: Anatoliy Colindres MD     Isolation Status: No active isolations      Inpatient consult to Infectious Diseases  Consult performed by: Ngozi Seymour MD  Consult ordered by: Tierney Powell MD        Consult received full consult to follow

## 2023-08-30 NOTE — PLAN OF CARE
Patient prepped for procedure, wife at bedside. PIV removed from right side for procedure, new PIV placed in left hand. No belongings with patient. Pt AAOx3, denies pain or nausea at this time.

## 2023-08-30 NOTE — ASSESSMENT & PLAN NOTE
· Patient's baseline creatinine 2.7-3.2 consistent with CKD stage 4 but creatinine 3.8 on admit. Patient has had creatinines this high in past 1 year. Patient with previous kidney transplant and last in 2005.   · Will obtain renal ultrasound of kidney transplant to evaluate.   · Consult Kidney Transplant medicine.   · Strict I+Os to closely monitor urine output.   · Avoid any nephrotoxic agents and meds.   · Renally adjust all medications based on GFR.   · Monitor daily BMP to follow renal function.

## 2023-08-30 NOTE — ASSESSMENT & PLAN NOTE
· Patient with known chronic anemia due to his advanced kidney disease. Baseline Hgb 9-10.  · Hgb 8.0 on 8/30.  · Patient has no signs of active bleeding and hemodynamically stable. Patient is asymptomatic related to anemia.   · Goal is keep Hgb >7 and consider blood transfusion if Hgb < 7 and asymptomatic or < 8 if patient becomes symptomatic.  · Plan is to monitor daily CBC.

## 2023-08-30 NOTE — SUBJECTIVE & OBJECTIVE
"Past Medical History:   Diagnosis Date    Atrial fibrillation     CAD (coronary artery disease) 2006    MI in 2006    CHF (congestive heart failure) 2017    CKD (chronic kidney disease) stage 3, GFR 30-59 ml/min     Dr. Latif.  in transplanted kidney    COVID-19 2/7/2023    Diverticulosis     Hyperlipidemia     Hypertension     Keloid cicatrix     Metabolic bone disease     Other emphysema 10/1/2019    Pericarditis     S/P kidney transplant 1992    x2 (1992 & 2005),    Thrombocytopenia     Thyroid disease     Tobacco use 09/05/2014       Past Surgical History:   Procedure Laterality Date    CARDIOVERSION  04/30/15    CHOLECYSTECTOMY      COLONOSCOPY  April 20, 2011    Diverticulosis, repeat recommended in 3 yrs., repeat colonoscopy 2014 revealed 2 polyps.  He should return in 5 years.    COLONOSCOPY N/A 3/13/2020    Procedure: COLONOSCOPY;  Surgeon: Chon Casper MD;  Location: [facility] (4TH FLR);  Service: Endoscopy;  Laterality: N/A;  ok to hold coumadin x5days-see telephone encounter 2/4/20-tb    COLONOSCOPY N/A 2/4/2021    Procedure: COLONOSCOPY;  Surgeon: Chon Casper MD;  Location: [facility] (4TH FLR);  Service: Endoscopy;  Laterality: N/A;  Eliquis - per Dr. GAVIN Blunt ok to hold x 2 days and "restarted asap"- ERW  last prep "poor", [id number] ordered/ Pt requests Dr. Casper  prep ins. mailed - COVID screening on 2/1/21 PCW- ERW    COLONOSCOPY N/A 3/3/2021    Procedure: COLONOSCOPY;  Surgeon: Chon Casper MD;  Location: [facility] (4TH FLR);  Service: Endoscopy;  Laterality: N/A;  Patient with his second poor bowel pre and has poor prep today.  If patient not intersted or can't get colonoscopy tomorrow will need constipation bowel prep and will need to restart his Eliquis today.Thanks,Chon     per Dr Blunt-ok to hold Eliquis 2 days prior      COVID     CORONARY ANGIOPLASTY WITH STENT PLACEMENT  9/13/2006    KIDNEY TRANSPLANT  2005    PARATHYROIDECTOMY      TREATMENT OF CARDIAC ARRHYTHMIA N/A " 3/28/2022    Procedure: CARDIOVERSION;  Surgeon: Migue Blunt MD;  Location: [facility];  Service: Cardiology;  Laterality: N/A;  AF, DCC, MAC, GP, 3 PREP*RODRIGO deferred pt 100% compliant*       Review of patient's allergies indicates:   Allergen Reactions    Ace inhibitors Swelling    Verapamil Other (See Comments)     Other reaction(s): Unknown    Povidone-iodine Itching       No current facility-administered medications on file prior to encounter.     Current Outpatient Medications on File Prior to Encounter   Medication Sig    amiodarone (PACERONE) 200 MG Tab Take 1 tablet (200 mg total) by mouth 2 (two) times daily for 14 days, THEN 1 tablet (200 mg total) once daily.    apixaban (ELIQUIS) 5 mg Tab Take 1 tablet (5 mg total) by mouth 2 (two) times daily.    aspirin (ECOTRIN) 81 MG EC tablet Take 1 tablet by mouth Daily.    atorvastatin (LIPITOR) 10 MG tablet TAKE 1 TABLET BY MOUTH EVERY DAY    b complex vitamins (B COMPLEX-VITAMIN B12) tablet Take 1 tablet by mouth once daily.    calcium carbonate (CALCIUM 600 ORAL) Take by mouth. Pt taking 1200 daily    doxazosin (CARDURA) 4 MG tablet Take 1 tablet (4 mg total) by mouth every 12 (twelve) hours.    famotidine (PEPCID) 20 MG tablet Take 1 tablet (20 mg total) by mouth 2 (two) times daily.    furosemide (LASIX) 40 MG tablet Take 20 mg by mouth 2 (two) times daily.    hydrALAZINE (APRESOLINE) 50 MG tablet TAKE 1 TABLET (50 MG TOTAL) BY MOUTH 3 (THREE) TIMES DAILY.    metoprolol succinate (TOPROL-XL) 25 MG 24 hr tablet Take 1 tablet (25 mg total) by mouth once daily.    MITIGARE 0.6 mg Cap TAKE 1 CAPSULE BY MOUTH TWICE A DAY AS NEEDED GOUT FLARE UP TO 3 DAYS    multivitamin (THERAGRAN) per tablet Take 1 tablet by mouth Daily.    NIFEdipine (PROCARDIA-XL) 60 MG (OSM) 24 hr tablet Take 1 tablet (60 mg total) by mouth 2 (two) times a day.    omeprazole (PRILOSEC) 20 MG capsule Take 1 capsule (20 mg total) by mouth once daily.    paricalcitoL (ZEMPLAR) 1 MCG capsule  TAKE 1 CAPSULE ON MONDAY, WEDNESDAY AND FRIDAY    predniSONE (DELTASONE) 5 MG tablet TAKE 1 TABLET BY MOUTH EVERY DAY IN THE MORNING    pulse oximeter (PULSE OXIMETER) device by Apply Externally route 2 (two) times a day. Use twice daily at 8 AM and 3 PM and record the value in MyChart as directed.    tacrolimus (PROGRAF) 1 MG Cap Take 2 capsules (2 mg total) by mouth every morning AND 2 capsules (2 mg total) every evening.    vitamin D 1000 units Tab Take 370 mg by mouth 2 (two) times daily. 2 tablets BID    acetaminophen (TYLENOL) 500 MG tablet Take 1 tablet (500 mg total) by mouth every 6 (six) hours as needed for Pain.    albuterol (PROVENTIL) 2.5 mg /3 mL (0.083 %) nebulizer solution Take 3 mLs (2.5 mg total) by nebulization every 6 (six) hours as needed for Wheezing. Rescue    albuterol (VENTOLIN HFA) 90 mcg/actuation inhaler Inhale 2 puffs into the lungs every 6 (six) hours as needed for Wheezing or Shortness of Breath. Rescue    ammonium lactate 12 % Crea Apply 1 g topically once daily.    fluticasone propion-salmeterol 115-21 mcg/dose (ADVAIR HFA) 115-21 mcg/actuation HFAA inhaler Inhale 2 puffs into the lungs every 12 (twelve) hours. Controller (Patient taking differently: Inhale 2 puffs into the lungs as needed. Controller)    fluticasone propionate (FLONASE) 50 mcg/actuation nasal spray 1 spray (50 mcg total) by Each Nostril route once daily. (Patient taking differently: 1 spray by Each Nostril route as needed.)    gabapentin (NEURONTIN) 100 MG capsule Take 1 capsule (100 mg total) by mouth every evening. 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     Patient is currently retired   Substance and Sexual Activity    Alcohol use: No    Drug use: No    Sexual activity: Yes     Partners: Female     Review of Systems   Constitutional:  Positive for chills. Negative for fatigue and fever.   HENT:  Negative for sore throat and trouble swallowing.    Eyes:  Negative for photophobia and visual disturbance.   Respiratory:  Negative for cough, shortness of breath and wheezing.    Cardiovascular:  Negative for chest pain, palpitations and leg swelling.   Gastrointestinal:  Negative for abdominal distention, abdominal pain, diarrhea, nausea and vomiting.   Genitourinary:  Negative for dysuria and hematuria.   Musculoskeletal:  Positive for arthralgias and joint swelling. Negative for neck pain and neck stiffness.   Skin:  Negative for rash and wound.   Neurological:  Negative for seizures, syncope, weakness, light-headedness and headaches.   Psychiatric/Behavioral:  Negative for confusion and decreased concentration.      Objective:     Vital Signs (Most Recent):  Temp: 97.5 °F (36.4 °C) (23 1643)  Pulse: 60 (23 1643)  Resp: 17 (23)  BP: (!) 144/63 (23)  SpO2: 98 % (23) Vital Signs (24h Range):  Temp:  [97.5 °F (36.4 °C)-97.9 °F (36.6 °C)] 97.5 °F (36.4 °C)  Pulse:  [60-75] 60  Resp:  [16-17] 17  SpO2:  [98 %-99 %] 98 %  BP: (138-144)/(61-63) 144/63     Weight: 95.3 kg (210 lb)  Body mass index is 27.71 kg/m².     Physical Exam  Constitutional:       General: He is not in acute distress.     Appearance: He is not toxic-appearing or diaphoretic.   HENT:      Head: Normocephalic and atraumatic.      Nose: Nose normal.   Eyes:      General: No scleral icterus.      Extraocular Movements: Extraocular movements intact.      Pupils: Pupils are equal, round, and reactive to light.   Cardiovascular:      Rate and Rhythm: Normal rate. Rhythm irregular.   Pulmonary:      Effort: Pulmonary effort is normal. No respiratory distress.      Breath sounds: No wheezing or rales.   Abdominal:      General: Abdomen is flat. There is no distension.      Palpations: Abdomen is soft.      Tenderness: There is no abdominal tenderness. There is no guarding.   Musculoskeletal:         General: Normal range of motion.      Cervical back: Normal range of motion and neck supple. No rigidity.      Right lower leg: No edema.      Left lower leg: No edema.      Comments:  Circumferential swelling to the right 3rd digit.  Significant pain with passive extension of the finger.  Tenderness to palpation along flexor tendon.  No active drainage.  Skin intact.  2+ distal pulse.    Skin:     General: Skin is warm and dry.      Coloration: Skin is not jaundiced.   Neurological:      General: No focal deficit present.      Mental Status: He is alert and oriented to person, place, and time.      Cranial Nerves: No cranial nerve deficit.   Psychiatric:         Mood and Affect: Mood normal.         Behavior: Behavior normal.              CRANIAL NERVES     CN III, IV, VI   Pupils are equal, round, and reactive to light.       Significant Labs: All pertinent labs within the past 24 hours have been reviewed.  CBC:   Recent Labs   Lab 08/29/23  1556   WBC 8.17   HGB 9.9*   HCT 31.8*        CMP:   Recent Labs   Lab 08/29/23  1556      K 4.2      CO2 19*   GLU 95   BUN 25*   CREATININE 3.9*   CALCIUM 8.9   PROT 7.5   ALBUMIN 3.2*   BILITOT 0.3   ALKPHOS 78   AST 23   ALT 18   ANIONGAP 11       Significant Imaging: I have reviewed all pertinent imaging results/findings within the past 24 hours.

## 2023-08-30 NOTE — ASSESSMENT & PLAN NOTE
Ibrahima Phelps is a 67 y.o. male with PMH of of CAD, atrial fibrillation (on Eliquis), heart failure, CKD, hypertension, hyperlipidemia, kidney transplant (on prograf), who presents for 5 days of worsening right middle finger swelling and pain.  Patient presented afebrile with an ESR of >120 and CRP of 17.7.  On physical exam, patient was noted to have 4/4 kanavel signs; no pain at any other joint was noted.  MRI was obtained showing fluid collection along the flexor tendon sheath concerning for flexor tenosynovitis of the right middle finger.      --IV Unasyn given in ED  --Admitted to medicine service for pre-operative optimization and medical evaluation.  --Pain control per primary  --Pt marked and consented for I&D of the Right Middle Finger, case booked. Pt understands need for definitive treatment of injuries.  --Pre-operative labs and imaging obtained in ED (UA, Type and Cross, PT-INR, CBC, BMP, PreAlbumin, CXR, EKG)   --Labs: Hb 9.9, Plt 171, Cr 3.9, A1C 5.5, UA pending  --DVT PPx: hold chemical, FCDs at all times when not ambulating  --Nonweightbearing to the RUE    »» Dispo: to OR this morning for I&D R-middle finger

## 2023-08-30 NOTE — HPI
67-year-old male with past medical history of CAD, atrial fibrillation, heart failure, CKD, hypertension, hyperlipidemia, kidney transplant on immunosuppressive therapy who presents to the emergency department with chief complaint of right 3rd digit pain and swelling that began 5 days ago.  It has progressively worsened since then.  He has pain with movement of the finger.  He denies trauma or injury.  No insect bite that he is aware of.  He denies symptoms of this nature in the past.  No paresthesias.  No fever, chills, nausea, vomiting.  Denies other worsening or alleviating factors.    In the ED patient afebrile and hemodynamically stable saturating well on room air. WBC wnl. ESR and CRP elevated. Orthopedic surgery consulted and evaluated patient with high suspicion for tenosynovitis. MRI ordered, patient started on Unasyn, admitted to the care of medicine for further evaluation and management.

## 2023-08-30 NOTE — NURSING
Arrived back from surgery AAOx4. VSS. Right hand middle finger wrapped with gauze and compression wrap. Numbness reported to R hand. No pain reported.  SCDs applied. Fall and safety measures are in place.

## 2023-08-30 NOTE — NURSING TRANSFER
Nursing Transfer Note      8/30/2023   10:05 AM      Reason patient is being transferred: Post prodedure    Transfer To: 540    Transfer via bed    Transported by PCT    Order for Tele Monitor? No    Any special needs or follow-up needed: Routine    Patient belongings transferred with patient: No    Chart send with patient: Yes    Notified: spouse    Patient reassessed at: 08/30/2023 @1000

## 2023-08-30 NOTE — SUBJECTIVE & OBJECTIVE
Interval History: Ortho consulted for right middle finger tenosynovitis and patient taken to OR this am by Dr. Martin Larsen and underwent arthrotomy of right middle finger proximal interphalangeal joint with lavage for septic arthritis. There was some murky fluid that was expressed from right middle finger. Fluid sent for cultures. Infectious Disease consulted to assist in antibiotic management. Kidney transplant medicine consulted also on admit as patient having increased creatinine above baseline. Patient known kidney transplant patient with post transplant CKD stage 4 with creatinine 2.5-3.2 but creatinine 3.8 on admit. Patient has had readings this high recently but want KTM to come and see patients. Patient back from surgery and reports numbness to right middle finger and got nerve block to his right hand for surgery this am so would expect right hand and middle finger to be numb.     Review of Systems   Constitutional:  Negative for fever.   Respiratory:  Negative for cough and shortness of breath.    Cardiovascular:  Negative for chest pain.   Gastrointestinal:  Negative for abdominal pain, diarrhea and nausea.   Musculoskeletal:  Positive for arthralgias (Right middle finger).   Neurological:  Negative for dizziness.   Psychiatric/Behavioral:  Negative for agitation and confusion.      Objective:     Vital Signs (Most Recent):  Temp: 98.1 °F (36.7 °C) (08/30/23 1200)  Pulse: 56 (08/30/23 1200)  Resp: 12 (08/30/23 1200)  BP: 155/72 (08/30/23 1200)  SpO2: 98 % (08/30/23 1200) on room air Vital Signs (24h Range):  Temp:  [96.4 °F (35.8 °C)-98.1 °F (36.7 °C)] 98.1 °F (36.7 °C)  Pulse:  [50-76] 56  Resp:  [9-24] 12  SpO2:  [93 %-100 %] 98 %  BP: (115-175)/(58-81) 155/72     Weight: 95.3 kg (210 lb)  Body mass index is 27.71 kg/m².    Intake/Output Summary (Last 24 hours) at 8/30/2023 1417  Last data filed at 8/30/2023 1030  Gross per 24 hour   Intake 865 ml   Output 0 ml   Net 865 ml         Physical  Exam  Vitals and nursing note reviewed.   Constitutional:       General: He is awake. He is not in acute distress.     Appearance: Normal appearance. He is normal weight. He is not ill-appearing.   Cardiovascular:      Rate and Rhythm: Normal rate and regular rhythm.      Heart sounds: Normal heart sounds. No murmur heard.     No friction rub. No gallop.   Pulmonary:      Effort: Pulmonary effort is normal. No respiratory distress.      Breath sounds: Normal breath sounds. No wheezing.   Abdominal:      General: Abdomen is flat. Bowel sounds are normal. There is no distension.      Palpations: Abdomen is soft.      Tenderness: There is no abdominal tenderness.   Musculoskeletal:      Right lower leg: No edema.      Left lower leg: No edema.   Skin:     Findings: No erythema.      Comments: Right hand and right middle finger wrapped in surgical bandage   Neurological:      Mental Status: He is alert and oriented to person, place, and time.   Psychiatric:         Mood and Affect: Mood normal.         Behavior: Behavior normal. Behavior is cooperative.         Thought Content: Thought content normal.         Judgment: Judgment normal.             Significant Labs: CBC:   Recent Labs   Lab 08/29/23  1556 08/30/23  0428   WBC 8.17 7.07   HGB 9.9* 8.0*   HCT 31.8* 25.6*    171     CMP:   Recent Labs   Lab 08/29/23  1556 08/30/23  0428    142   K 4.2 4.1    112*   CO2 19* 20*   GLU 95 80   BUN 25* 28*   CREATININE 3.9* 3.8*   CALCIUM 8.9 8.1*   PROT 7.5 5.9*   ALBUMIN 3.2* 2.6*   BILITOT 0.3 0.3   ALKPHOS 78 54*   AST 23 19   ALT 18 12   ANIONGAP 11 10     Sed Rate   Date Value Ref Range Status   08/29/2023 >120 (H) 0 - 23 mm/Hr Final     CRP   Date Value Ref Range Status   08/29/2023 17.7 (H) 0.0 - 8.2 mg/L Final     Recent Labs     08/30/23  0428   TACROLIMUS 4.6*        Significant Imaging: I have reviewed all pertinent imaging results/findings within the past 24 hours.

## 2023-08-30 NOTE — CONSULTS
[facility] [place]  Infectious Disease  Consult Note    Patient Name: Ibrahima Phelps  MRN: [id number]  Admission Date: 8/29/2023  Hospital Length of Stay: 1 days  Attending Physician: Tierney Powell MD  Primary Care Provider: Anatoliy Colindres MD     Isolation Status: No active isolations    Patient information was obtained from patient and past medical records.      Consults  Assessment/Plan:     ID  Pyogenic arthritis of right hand  67-year-old male with history of CAD, afib, kidney transplant, presented with right third digit pain, found to have PIP septic arthritis s/p washout 8/30/2023.    Recommendations:  - Follow-up OR cultures  - Start ceftriaxone 2g IV q24 hours, daptomycin 8 mg/kg IV q24 hours    Orthopedic  * Flexor tenosynovitis of right middle finger  See pyogenic arthritis of right hand      50 minutes of total time spent on the encounter, which includes face to face time and non-face to face time preparing to see the patient (eg, review of tests), obtaining and reviewing separately obtained history, documenting clinical information in the electronic or other health record, independently interpreting results (not separately reported) and communicating results to the patient/family/caregiver, and care coordination (not separately reported).       Thank you for your consult. I will follow-up with patient. Please contact us if you have any additional questions.    Ngozi Seymour MD  Infectious Disease  [facility] [place]    Subjective:     Principal Problem: Flexor tenosynovitis of finger    HPI: 67-year-old male with history of CAD, afib, kidney transplant, presented with right third digit pain. Pain started about 5 days prior to admission, progressively worsened to the point where he was unable to move his finger. He denies any trauma to the area. No exposure to dirt, water. Possibly related to insect bite. No animals at home. No outdoor activities. Does housework and intermittent projects in the  "garage. Patient was started empirically on amp-sulbactam IV. Ortho performed washout this AM.        Past Medical History:   Diagnosis Date    Atrial fibrillation     CAD (coronary artery disease) 2006    MI in 2006    CHF (congestive heart failure) 2017    CKD (chronic kidney disease) stage 3, GFR 30-59 ml/min     Dr. Latif.  in transplanted kidney    COVID-19 2/7/2023    Diverticulosis     Hyperlipidemia     Hypertension     Keloid cicatrix     Metabolic bone disease     Other emphysema 10/1/2019    Pericarditis     S/P kidney transplant 1992    x2 (1992 & 2005),    Thrombocytopenia     Thyroid disease     Tobacco use 09/05/2014       Past Surgical History:   Procedure Laterality Date    CARDIOVERSION  04/30/15    CHOLECYSTECTOMY      COLONOSCOPY  April 20, 2011    Diverticulosis, repeat recommended in 3 yrs., repeat colonoscopy 2014 revealed 2 polyps.  He should return in 5 years.    COLONOSCOPY N/A 3/13/2020    Procedure: COLONOSCOPY;  Surgeon: Chon Casper MD;  Location: [facility] (4TH FLR);  Service: Endoscopy;  Laterality: N/A;  ok to hold coumadin x5days-see telephone encounter 2/4/20-tb    COLONOSCOPY N/A 2/4/2021    Procedure: COLONOSCOPY;  Surgeon: Chon Casper MD;  Location: [facility] (4TH FLR);  Service: Endoscopy;  Laterality: N/A;  Eliquis - per Dr. GAVIN chua to hold x 2 days and "restarted asap"- ERW  last prep "poor", [id number] ordered/ Pt requests Dr. Casper  prep ins. mailed - COVID screening on 2/1/21 PCW- ERW    COLONOSCOPY N/A 3/3/2021    Procedure: COLONOSCOPY;  Surgeon: Chon Casper MD;  [facility]  Service: Endoscopy;  Laterality: N/A;  Patient with his second poor bowel pre and has poor prep today.  If patient not intersted or can't get colonoscopy tomorrow will need constipation bowel prep and will need to restart his Eliquis today.Thanks,Chon Blunt-ok to hold Eliquis 2 days prior      COVID     CORONARY ANGIOPLASTY " WITH STENT PLACEMENT  9/13/2006    KIDNEY TRANSPLANT  2005    PARATHYROIDECTOMY      TREATMENT OF CARDIAC ARRHYTHMIA N/A 3/28/2022    Procedure: CARDIOVERSION;  Surgeon: Migue Blunt MD;  Location: [facility];  Service: Cardiology;  Laterality: N/A;  AF, DCC, MAC, GP, 3 PREP*RODRIGO deferred pt 100% compliant*       Review of patient's allergies indicates:   Allergen Reactions    Ace inhibitors Swelling    Verapamil Other (See Comments)     Other reaction(s): Unknown    Povidone-iodine Itching       Medications:  Medications Prior to Admission   Medication Sig    amiodarone (PACERONE) 200 MG Tab Take 1 tablet (200 mg total) by mouth 2 (two) times daily for 14 days, THEN 1 tablet (200 mg total) once daily.    apixaban (ELIQUIS) 5 mg Tab Take 1 tablet (5 mg total) by mouth 2 (two) times daily.    aspirin (ECOTRIN) 81 MG EC tablet Take 1 tablet by mouth Daily.    atorvastatin (LIPITOR) 10 MG tablet TAKE 1 TABLET BY MOUTH EVERY DAY    b complex vitamins (B COMPLEX-VITAMIN B12) tablet Take 1 tablet by mouth once daily.    calcium carbonate (CALCIUM 600 ORAL) Take by mouth. Pt taking 1200 daily    doxazosin (CARDURA) 4 MG tablet Take 1 tablet (4 mg total) by mouth every 12 (twelve) hours.    famotidine (PEPCID) 20 MG tablet Take 1 tablet (20 mg total) by mouth 2 (two) times daily.    furosemide (LASIX) 40 MG tablet Take 20 mg by mouth 2 (two) times daily.    hydrALAZINE (APRESOLINE) 50 MG tablet TAKE 1 TABLET (50 MG TOTAL) BY MOUTH 3 (THREE) TIMES DAILY.    metoprolol succinate (TOPROL-XL) 25 MG 24 hr tablet Take 1 tablet (25 mg total) by mouth once daily.    MITIGARE 0.6 mg Cap TAKE 1 CAPSULE BY MOUTH TWICE A DAY AS NEEDED GOUT FLARE UP TO 3 DAYS    multivitamin (THERAGRAN) per tablet Take 1 tablet by mouth Daily.    NIFEdipine (PROCARDIA-XL) 60 MG (OSM) 24 hr tablet Take 1 tablet (60 mg total) by mouth 2 (two) times a day.    omeprazole (PRILOSEC) 20 MG capsule Take 1 capsule (20 mg total) by mouth  once daily.    paricalcitoL (ZEMPLAR) 1 MCG capsule TAKE 1 CAPSULE ON MONDAY, WEDNESDAY AND FRIDAY    predniSONE (DELTASONE) 5 MG tablet TAKE 1 TABLET BY MOUTH EVERY DAY IN THE MORNING    pulse oximeter (PULSE OXIMETER) device by Apply Externally route 2 (two) times a day. Use twice daily at 8 AM and 3 PM and record the value in MyChart as directed.    tacrolimus (PROGRAF) 1 MG Cap Take 2 capsules (2 mg total) by mouth every morning AND 2 capsules (2 mg total) every evening.    vitamin D 1000 units Tab Take 370 mg by mouth 2 (two) times daily. 2 tablets BID    acetaminophen (TYLENOL) 500 MG tablet Take 1 tablet (500 mg total) by mouth every 6 (six) hours as needed for Pain.    albuterol (PROVENTIL) 2.5 mg /3 mL (0.083 %) nebulizer solution Take 3 mLs (2.5 mg total) by nebulization every 6 (six) hours as needed for Wheezing. Rescue    albuterol (VENTOLIN HFA) 90 mcg/actuation inhaler Inhale 2 puffs into the lungs every 6 (six) hours as needed for Wheezing or Shortness of Breath. Rescue    ammonium lactate 12 % Crea Apply 1 g topically once daily.    fluticasone propion-salmeterol 115-21 mcg/dose (ADVAIR HFA) 115-21 mcg/actuation HFAA inhaler Inhale 2 puffs into the lungs every 12 (twelve) hours. Controller (Patient taking differently: Inhale 2 puffs into the lungs as needed. Controller)    fluticasone propionate (FLONASE) 50 mcg/actuation nasal spray 1 spray (50 mcg total) by Each Nostril route once daily. (Patient taking differently: 1 spray by Each Nostril route as needed.)    gabapentin (NEURONTIN) 100 MG capsule Take 1 capsule (100 mg total) by mouth every evening. (Patient taking differently: Take 100 mg by mouth as needed.)     Antibiotics (From admission, onward)      Start     Stop Route Frequency Ordered    08/30/23 1600  cefTRIAXone (ROCEPHIN) 2 g in dextrose 5 % in water (D5W) 100 mL IVPB (MB+)         -- IV Every 24 hours (non-standard times) 08/30/23 1234    08/30/23 1400  DAPTOmycin  (CUBICIN) 760 mg in sodium chloride 0.9% SolP 50 mL IVPB         -- IV Every 24 hours (non-standard times) 23 1235          Antifungals (From admission, onward)      None          Antivirals (From admission, onward)      None             Immunization History   Administered Date(s) Administered    COVID-19, MRNA, LN-S, PF (Pfizer) (Purple Cap) 2021, 2021, 2021, 2022    COVID-19, mRNA, LNP-S, bivalent booster, PF (PFIZER OMICRON) 10/13/2022    Influenza (FLUAD) - Quadrivalent - Adjuvanted - PF *Preferred* (65+) 10/08/2020, 2021, 10/13/2022    Pneumococcal Conjugate - 13 Valent 2017    Pneumococcal Polysaccharide - 23 Valent 2013, 2020, 2020    Tdap 2018    Zoster Recombinant 10/08/2020, 10/08/2020, 2020, 2020       Family History       Problem Relation (Age of Onset)    Heart disease Father    Kidney disease Sister, Brother    Kidney failure Sister, Brother    No Known Problems Sister, Brother, Sister, Sister    Thyroid disease Mother          Social History     Socioeconomic History    Marital status:    Occupational History    Occupation:    Tobacco Use    Smoking status: Former     Current packs/day: 0.00     Average packs/day: 0.5 packs/day for 40.0 years (20.0 ttl pk-yrs)     Types: Cigarettes     Start date: 1982     Quit date: 2022     Years since quittin.5    Smokeless tobacco: Never    Tobacco comments:     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     Patient is currently retired   Substance and Sexual Activity    Alcohol use: No    Drug use: No    Sexual activity: Yes     Partners: Female   Social History Narrative    Retired      Social Determinants of Health     Financial Resource Strain: Low Risk  (2023)    Overall Financial Resource Strain (CARDIA)     Difficulty of Paying Living Expenses: Not hard at all   Food Insecurity:  Unknown (2/9/2023)    Hunger Vital Sign     Ran Out of Food in the Last Year: Never true   Transportation Needs: No Transportation Needs (2/9/2023)    PRAPARE - Transportation     Lack of Transportation (Medical): No     Lack of Transportation (Non-Medical): No   Physical Activity: Inactive (8/30/2023)    Exercise Vital Sign     Days of Exercise per Week: 0 days     Minutes of Exercise per Session: 0 min   Stress: No Stress Concern Present (2/9/2023)    [employer] - Occupational Stress Questionnaire     Feeling of Stress : Only a little   Social Connections: Unknown (8/30/2023)    Social Connection and Isolation Panel [NHANES]     Frequency of Communication with Friends and Family: More than three times a week     Frequency of Social Gatherings with Friends and Family: Once a week   Housing Stability: Unknown (2/9/2023)    Housing Stability Vital Sign     Unable to Pay for Housing in the Last Year: No     Unstable Housing in the Last Year: No     Review of Systems   Constitutional:  Negative for chills, diaphoresis and fever.   HENT:  Negative for rhinorrhea and sore throat.    Respiratory:  Negative for cough and shortness of breath.    Cardiovascular:  Negative for chest pain and leg swelling.   Gastrointestinal:  Negative for abdominal pain, diarrhea, nausea and vomiting.   Genitourinary:  Negative for dysuria and hematuria.   Musculoskeletal:  Negative for arthralgias and myalgias.        Right third finger pain   Skin:  Negative for rash.   Neurological:  Negative for headaches.     Objective:     Vital Signs (Most Recent):  Temp: 98 °F (36.7 °C) (08/30/23 1642)  Pulse: 61 (08/30/23 1642)  Resp: 12 (08/30/23 1642)  BP: (!) 174/76 (08/30/23 1642)  SpO2: 98 % (08/30/23 1642) Vital Signs (24h Range):  Temp:  [96.4 °F (35.8 °C)-98.1 °F (36.7 °C)] 98 °F (36.7 °C)  Pulse:  [50-76] 61  Resp:  [9-24] 12  SpO2:  [93 %-100 %] 98 %  BP: (115-175)/(58-81) 174/76     Weight: 95.3 kg  (210 lb)  Body mass index is 27.71 kg/m².    Estimated Creatinine Clearance: 21 mL/min (A) (based on SCr of 3.8 mg/dL (H)).     Physical Exam  Constitutional:       General: He is not in acute distress.     Appearance: He is well-developed. He is not diaphoretic.   HENT:      Head: Normocephalic and atraumatic.      Nose: Nose normal.   Eyes:      Conjunctiva/sclera: Conjunctivae normal.   Pulmonary:      Effort: Pulmonary effort is normal. No respiratory distress.   Abdominal:      General: Abdomen is flat. There is no distension.   Musculoskeletal:      Cervical back: Normal range of motion.      Right lower leg: No edema.      Left lower leg: No edema.      Comments: Right third finger in cast   Skin:     General: Skin is warm and dry.      Findings: No erythema or rash.   Neurological:      Mental Status: He is alert.   Psychiatric:         Behavior: Behavior normal.          Significant Labs: All pertinent labs within the past 24 hours have been reviewed.    Significant Imaging: I have reviewed all pertinent imaging results/findings within the past 24 hours.

## 2023-08-30 NOTE — TRANSFER OF CARE
"Anesthesia Transfer of Care Note    Patient: Ibrahima Phelps    Procedure(s) Performed: Procedure(s) (LRB):  IRRIGATION AND DEBRIDEMENT, RIGHT MIDDLE FINGER (Right)    Patient location: PACU    Anesthesia Type: general and MAC    Transport from OR: Transported from OR on 6-10 L/min O2 by face mask with adequate spontaneous ventilation    Post pain: adequate analgesia    Post assessment: no apparent anesthetic complications and tolerated procedure well    Post vital signs: stable    Level of consciousness: awake    Nausea/Vomiting: no nausea/vomiting    Complications: none    Transfer of care protocol was followed      Last vitals:   Visit Vitals  /71 (BP Location: Left arm, Patient Position: Lying)   Pulse 72   Temp 36.6 °C (97.9 °F)   Resp 18   Ht 6' 1" (1.854 m)   Wt 95.3 kg (210 lb)   SpO2 97%   BMI 27.71 kg/m²     "

## 2023-08-30 NOTE — ANESTHESIA PREPROCEDURE EVALUATION
[facility]  Anesthesia Pre-Operative Evaluation         Patient Name: Ibrahima Phelps  YOB: 1955  MRN: [id number]    SUBJECTIVE:     Pre-operative evaluation for Procedure(s) (LRB):  IRRIGATION AND DEBRIDEMENT, RIGHT MIDDLE FINGER; Cysto tubing, hand table (Right)     08/30/2023    Ibrahima Phelps is a 67 y.o. male w/ a significant PMHx of  CAD, atrial fibrillation (on home amiodarone and metoprolol), chronic diastolic heart failure, COPD (current smoker 1/4 ppd), CKD, HTN, and kidney transplant on immunosuppressive therapy presenting with worsening R middle finger swelling/pain with concern for flexor tenosynovitis.    Patient now presents for the above procedure(s).    TTE 2/10/23   The left ventricle is normal in size with normal systolic function. The estimated ejection fraction is 65%.   Normal right ventricular size with normal right ventricular systolic function.   Grade II left ventricular diastolic dysfunction.   Severe left atrial enlargement.   There is mild aortic valve stenosis. Aortic valve area is 1.8 cm2; peak velocity is 2.9 m/s; mean gradient is 15 mmHg.   Pulmonary HTN - the estimated PA systolic pressure is > 41mm Hg. (The IVC was not well visualized.)         LDA:        Peripheral IV - Single Lumen 08/29/23 1555 22 G Anterior;Right Forearm (Active)   Site Assessment Clean;Dry;Intact 08/30/23 0200   Extremity Assessment Distal to IV No abnormal discoloration 08/30/23 0200   Line Status Saline locked 08/30/23 0200   Dressing Status Clean;Intact;Dry 08/30/23 0200   Dressing Intervention Integrity maintained 08/30/23 0200   Number of days: 0       Prev airway: None documented.    Drips: None documented.      Patient Active Problem List   Diagnosis    Hypertension    Metabolic bone disease    CAD (coronary artery disease)    Mixed hyperlipidemia    Diverticulosis    Carotid artery bruit    Secondary renal hyperparathyroidism    History of adenomatous polyp  of colon    Calcific tendinitis of left shoulder    Left rotator cuff tear    Obesity (BMI 30-39.9)    Impingement syndrome of left shoulder    DAUGHERTY (dyspnea on exertion)    Supraventricular tachycardia    Prediabetes    Long term (current) use of anticoagulants [V58.61]    H/O kidney transplant    Elevated troponin    History of pericarditis    Hypertensive heart and kidney disease    Paroxysmal atrial fibrillation    Hypokalemia    Other emphysema    Multiple lung nodules on CT    Right elbow pain    Colon adenoma    Colon polyps    Carotid artery disease    Kidney replaced by transplant    Postural tremor    Polyneuropathy    Former smoker    Snoring    Aortic atherosclerosis    Memory changes    Prophylactic immunotherapy    Pulmonary HTN    Right ankle pain    Palliative care encounter    Advance care planning    Immunodeficiency due to treatment with immunosuppressive medication    CKD (chronic kidney disease) stage 4, GFR 15-29 ml/min    Chronic diastolic CHF (congestive heart failure)    Flexor tenosynovitis of right middle finger       Review of patient's allergies indicates:   Allergen Reactions    Ace inhibitors Swelling    Verapamil Other (See Comments)     Other reaction(s): Unknown    Povidone-iodine Itching       Current Inpatient Medications:   amiodarone  200 mg Oral Daily    ampicillin-sulbactim (UNASYN) IVPB  3 g Intravenous Q12H    atorvastatin  10 mg Oral Daily    doxazosin  4 mg Oral Q12H    furosemide  20 mg Oral BID    hydrALAZINE  50 mg Oral TID    metoprolol succinate  25 mg Oral Daily    NIFEdipine  60 mg Oral BID    pantoprazole  40 mg Oral Daily    polyethylene glycol  17 g Oral Daily    predniSONE  5 mg Oral Daily    tacrolimus  2 mg Oral BID       No current facility-administered medications on file prior to encounter.     Current Outpatient Medications on File Prior to Encounter   Medication Sig Dispense Refill    amiodarone  (PACERONE) 200 MG Tab Take 1 tablet (200 mg total) by mouth 2 (two) times daily for 14 days, THEN 1 tablet (200 mg total) once daily. 90 tablet 0    apixaban (ELIQUIS) 5 mg Tab Take 1 tablet (5 mg total) by mouth 2 (two) times daily. 180 tablet 3    aspirin (ECOTRIN) 81 MG EC tablet Take 1 tablet by mouth Daily.      atorvastatin (LIPITOR) 10 MG tablet TAKE 1 TABLET BY MOUTH EVERY DAY 90 tablet 1    b complex vitamins (B COMPLEX-VITAMIN B12) tablet Take 1 tablet by mouth once daily. 90 tablet 3    calcium carbonate (CALCIUM 600 ORAL) Take by mouth. Pt taking 1200 daily      doxazosin (CARDURA) 4 MG tablet Take 1 tablet (4 mg total) by mouth every 12 (twelve) hours. 180 tablet 3    famotidine (PEPCID) 20 MG tablet Take 1 tablet (20 mg total) by mouth 2 (two) times daily. 180 tablet 3    furosemide (LASIX) 40 MG tablet Take 20 mg by mouth 2 (two) times daily.      hydrALAZINE (APRESOLINE) 50 MG tablet TAKE 1 TABLET (50 MG TOTAL) BY MOUTH 3 (THREE) TIMES DAILY. 270 tablet 3    metoprolol succinate (TOPROL-XL) 25 MG 24 hr tablet Take 1 tablet (25 mg total) by mouth once daily. 90 tablet 3    MITIGARE 0.6 mg Cap TAKE 1 CAPSULE BY MOUTH TWICE A DAY AS NEEDED GOUT FLARE UP TO 3 DAYS 15 capsule 11    multivitamin (THERAGRAN) per tablet Take 1 tablet by mouth Daily.      NIFEdipine (PROCARDIA-XL) 60 MG (OSM) 24 hr tablet Take 1 tablet (60 mg total) by mouth 2 (two) times a day. 180 tablet 3    omeprazole (PRILOSEC) 20 MG capsule Take 1 capsule (20 mg total) by mouth once daily. 14 capsule 0    paricalcitoL (ZEMPLAR) 1 MCG capsule TAKE 1 CAPSULE ON MONDAY, WEDNESDAY AND FRIDAY 36 capsule 3    predniSONE (DELTASONE) 5 MG tablet TAKE 1 TABLET BY MOUTH EVERY DAY IN THE MORNING 90 tablet 3    pulse oximeter (PULSE OXIMETER) device by Apply Externally route 2 (two) times a day. Use twice daily at 8 AM and 3 PM and record the value in [facility] as directed. 1 each 0    tacrolimus (PROGRAF) 1 MG Cap Take 2 capsules  (2 mg total) by mouth every morning AND 2 capsules (2 mg total) every evening. 270 capsule 3    vitamin D 1000 units Tab Take 370 mg by mouth 2 (two) times daily. 2 tablets BID      acetaminophen (TYLENOL) 500 MG tablet Take 1 tablet (500 mg total) by mouth every 6 (six) hours as needed for Pain. 20 tablet 0    albuterol (PROVENTIL) 2.5 mg /3 mL (0.083 %) nebulizer solution Take 3 mLs (2.5 mg total) by nebulization every 6 (six) hours as needed for Wheezing. Rescue 9 mL 6    albuterol (VENTOLIN HFA) 90 mcg/actuation inhaler Inhale 2 puffs into the lungs every 6 (six) hours as needed for Wheezing or Shortness of Breath. Rescue 18 g 3    ammonium lactate 12 % Crea Apply 1 g topically once daily. 140 g 1    fluticasone propion-salmeterol 115-21 mcg/dose (ADVAIR HFA) 115-21 mcg/actuation HFAA inhaler Inhale 2 puffs into the lungs every 12 (twelve) hours. Controller (Patient taking differently: Inhale 2 puffs into the lungs as needed. Controller) 12 g 5    fluticasone propionate (FLONASE) 50 mcg/actuation nasal spray 1 spray (50 mcg total) by Each Nostril route once daily. (Patient taking differently: 1 spray by Each Nostril route as needed.) 18.2 mL 0    gabapentin (NEURONTIN) 100 MG capsule Take 1 capsule (100 mg total) by mouth every evening. (Patient taking differently: Take 100 mg by mouth as needed.) 30 capsule 0       Past Surgical History:   Procedure Laterality Date    CARDIOVERSION  04/30/15    CHOLECYSTECTOMY      COLONOSCOPY  April 20, 2011    Diverticulosis, repeat recommended in 3 yrs., repeat colonoscopy 2014 revealed 2 polyps.  He should return in 5 years.    COLONOSCOPY N/A 3/13/2020    Procedure: COLONOSCOPY;  Surgeon: Chon Casper MD;  [facility]  Service: Endoscopy;  Laterality: N/A;  ok to hold coumadin x5days-see telephone encounter 2/4/20-tb    COLONOSCOPY N/A 2/4/2021    Procedure: COLONOSCOPY;  Surgeon: Chon Casper MD;  [facility]   "Service: Endoscopy;  Laterality: N/A;  Eliquis - per Dr. GAVIN Blunt ok to hold x 2 days and "restarted asap"- ERW  last prep "poor", [id number] ordered/ Pt requests Dr. Casper  prep ins. mailed - COVID screening on 21 PCW- ERW    COLONOSCOPY N/A 3/3/2021    Procedure: COLONOSCOPY;  Surgeon: Chon Casper MD;  Location: [facility] ENDO ([address] [place] [address]);  Service: Endoscopy;  Laterality: N/A;  Patient with his second poor bowel pre and has poor prep today.  If patient not intersted or can't get colonoscopy tomorrow will need constipation bowel prep and will need to restart his Eliquis today.Thanks,Chon Blunt-ok to hold Eliquis 2 days prior      COVID     CORONARY ANGIOPLASTY WITH STENT PLACEMENT  2006    KIDNEY TRANSPLANT  2005    PARATHYROIDECTOMY      TREATMENT OF CARDIAC ARRHYTHMIA N/A 3/28/2022    Procedure: CARDIOVERSION;  Surgeon: Migue Blunt MD;  Location: [facility] EP LAB;  Service: Cardiology;  Laterality: N/A;  AF, DCC, MAC, GP, 3 PREP*RODRIGO deferred pt 100% compliant*       Social History     Socioeconomic History    Marital status:    Occupational History    Occupation:    Tobacco Use    Smoking status: Former     Current packs/day: 0.00     Average packs/day: 0.5 packs/day for 40.0 years (20.0 ttl pk-yrs)     Types: Cigarettes     Start date: 1982     Quit date: 2022     Years since quittin.5    Smokeless tobacco: Never    Tobacco comments:     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     Patient is currently retired   Substance and Sexual Activity    Alcohol use: No    Drug use: No    Sexual activity: Yes     Partners: Female   Social History Narrative    Retired      Social Determinants of Health     Financial Resource Strain: Low Risk  (2023)    Overall Financial Resource Strain (CARDIA)     Difficulty of Paying Living Expenses: Not hard at all   Food Insecurity: Unknown (2023)    Hunger Vital " Sign     Ran Out of Food in the Last Year: Never true   Transportation Needs: No Transportation Needs (2/9/2023)    PRAPARE - Transportation     Lack of Transportation (Medical): No     Lack of Transportation (Non-Medical): No   Stress: No Stress Concern Present (2/9/2023)    [place] [employer] - Occupational Stress Questionnaire     Feeling of Stress : Only a little   Social Connections: Unknown (2/9/2023)    Social Connection and Isolation Panel [NHANES]     Frequency of Communication with Friends and Family: More than three times a week     Frequency of Social Gatherings with Friends and Family: More than three times a week   Housing Stability: Unknown (2/9/2023)    Housing Stability Vital Sign     Unable to Pay for Housing in the Last Year: No     Unstable Housing in the Last Year: No       OBJECTIVE:     Vital Signs Range (Last 24H):  Temp:  [35.8 °C (96.4 °F)-36.6 °C (97.9 °F)]   Pulse:  [60-76]   Resp:  [16-18]   BP: (138-153)/(61-72)   SpO2:  [93 %-99 %]       Significant Labs:  Lab Results   Component Value Date    WBC 8.17 08/29/2023    HGB 9.9 (L) 08/29/2023    HCT 31.8 (L) 08/29/2023     08/29/2023    CHOL 152 01/04/2023    TRIG 88 01/04/2023    HDL 47 01/04/2023    ALT 18 08/29/2023    AST 23 08/29/2023     08/29/2023    K 4.2 08/29/2023     08/29/2023    CREATININE 3.9 (H) 08/29/2023    BUN 25 (H) 08/29/2023    CO2 19 (L) 08/29/2023    TSH 1.494 06/20/2023    PSA 0.69 01/04/2023    INR 1.1 07/13/2023    HGBA1C 5.5 01/04/2023       Diagnostic Studies: No relevant studies.    EKG:   Results for orders placed or performed in visit on 07/20/23   IN OFFICE EKG 12-LEAD (to [place])    Collection Time: 07/20/23  9:46 AM    Narrative    Test Reason : R06.09,    Vent. Rate : 057 BPM     Atrial Rate : 057 BPM     P-R Int : 170 ms          QRS Dur : 102 ms      QT Int : 458 ms       P-R-T Axes : 082 067 -44 degrees     QTc Int : 445 ms    Sinus bradycardia with  "Premature atrial complexes with Aberrant conduction  LVH with repolarization abnormality  Abnormal ECG  When compared with ECG of 17-JUL-2023 06:55,  No significant change was found  Confirmed by Agusto Cheatham MD ([id number]) on 7/20/2023 3:54:26 PM    Referred By: DIONICIO TRIVEDI           Confirmed By:Agusto Cheatham MD       2D ECHO:  TTE:  Results for orders placed or performed during the hospital encounter of 02/07/23   Echo   Result Value Ref Range    BSA 2.29 m2    TDI SEPTAL 0.06 m/s    LV LATERAL E/E' RATIO 11.63 m/s    LV SEPTAL E/E' RATIO 15.50 m/s    LA WIDTH 4.68 cm    TDI LATERAL 0.08 m/s    LVIDd 5.55 3.5 - 6.0 cm    IVS 1.07 0.6 - 1.1 cm    Posterior Wall 1.10 0.6 - 1.1 cm    LVIDs 3.79 2.1 - 4.0 cm    FS 32 28 - 44 %    LA volume 114.72 cm3    Sinus 3.56 cm    STJ 2.93 cm    Ascending aorta 3.44 cm    LV mass 241.17 g    LA size 4.27 cm    RVDD 4.07 cm    TAPSE 2.31 cm    Left Ventricle Relative Wall Thickness 0.40 cm    AV mean gradient 15 mmHg    AV valve area 1.84 cm2    AV Velocity Ratio 0.38     AV index (prosthetic) 0.44     MV valve area p 1/2 method 4.55 cm2    E/A ratio 0.97     Mean e' 0.07 m/s    E wave deceleration time 166.80 msec    MV "A" wave duration 13.32 msec    Pulm vein S/D ratio 1.11     LVOT diameter 2.30 cm    LVOT area 4.2 cm2    LVOT peak huan 1.08 m/s    LVOT peak VTI 27.24 cm    Ao peak huan 2.87 m/s    Ao VTI 61.34 cm    Mr max huan 0.06 m/s    LVOT stroke volume 113.12 cm3    AV peak gradient 33 mmHg    E/E' ratio 13.29 m/s    MV Peak E Huan 0.93 m/s    TR Max Huan 3.20 m/s    MV stenosis pressure 1/2 time 48.37 ms    MV Peak A Huan 0.96 m/s    PV Peak S Huan 0.52 m/s    PV Peak D Huan 0.47 m/s    LV Systolic Volume 61.41 mL    LV Systolic Volume Index 27.2 mL/m2    LV Diastolic Volume 150.56 mL    LV Diastolic Volume Index 66.62 mL/m2    LA Volume Index 50.8 mL/m2    LV Mass Index 107 g/m2    RA Major Axis 6.49 cm    Left Atrium Minor Axis 6.84 cm    Left Atrium Major Axis 6.67 cm "    Triscuspid Valve Regurgitation Peak Gradient 41 mmHg    LA Volume Index (Mod) 49.8 mL/m2    LA volume (mod) 112.61 cm3    RA Width 4.05 cm    EF 65 %    Narrative    · The left ventricle is normal in size with normal systolic function. The   estimated ejection fraction is 65%.  · Normal right ventricular size with normal right ventricular systolic   function.  · Grade II left ventricular diastolic dysfunction.  · Severe left atrial enlargement.  · There is mild aortic valve stenosis. Aortic valve area is 1.8 cm2; peak   velocity is 2.9 m/s; mean gradient is 15 mmHg.  · Pulmonary HTN - the estimated PA systolic pressure is > 41mm Hg. (The   IVC was not well visualized.)          RODRIGO:  No results found. However, due to the size of the patient record, not all encounters were searched. Please check Results Review for a complete set of results.    ASSESSMENT/PLAN:         Pre-op Assessment    I have reviewed the Patient Summary Reports.     I have reviewed the Nursing Notes. I have reviewed the NPO Status.   I have reviewed the Medications.     Review of Systems  Anesthesia Hx:  No problems with previous Anesthesia  History of prior surgery of interest to airway management or planning: Previous anesthesia: General Denies Family Hx of Anesthesia complications.   Denies Personal Hx of Anesthesia complications.   Social:  Smoker 20 pack years   Hematology/Oncology:  Hematology Normal   Oncology Normal     EENT/Dental:EENT/Dental Normal   Cardiovascular:   Exercise tolerance: poor Hypertension, well controlled CAD   CHF    Pulmonary:   COPD Shortness of breath    Renal/:   Chronic Renal Disease, CKD CKD Stage 3  S/P kidney transplant 1992 & 2005   Hepatic/GI:  Hepatic/GI Normal    Musculoskeletal:  Musculoskeletal Normal    Neurological:   Neuromuscular Disease,    Endocrine:  Endocrine Normal    Dermatological:  Skin Normal    Psych:  Psychiatric Normal           Physical Exam  General: Well nourished, Cooperative,  Alert and Oriented    Airway:  Mallampati: II   Mouth Opening: Normal  TM Distance: Normal  Tongue: Normal  Neck ROM: Normal ROM    Dental:  Periodontal disease        Anesthesia Plan  Type of Anesthesia, risks & benefits discussed:    Anesthesia Type: Gen ETT, Gen Natural Airway, MAC  Intra-op Monitoring Plan: Standard ASA Monitors  Post Op Pain Control Plan: multimodal analgesia and IV/PO Opioids PRN  Induction:  IV  Airway Plan: Direct, Post-Induction  Informed Consent: Informed consent signed with the Patient and all parties understand the risks and agree with anesthesia plan.  All questions answered.   ASA Score: 3  Day of Surgery Review of History & Physical: H&P Update referred to the surgeon/provider.    Ready For Surgery From Anesthesia Perspective.     .

## 2023-08-30 NOTE — ASSESSMENT & PLAN NOTE
67-year-old male with history of CAD, afib, kidney transplant, presented with right third digit pain, found to have PIP septic arthritis s/p washout 8/30/2023.    Recommendations:  - Follow-up OR cultures  - Start ceftriaxone 2g IV q24 hours, daptomycin 8 mg/kg IV q24 hours

## 2023-08-30 NOTE — NURSING
Nurses Note -- 4 Eyes      8/29/2023   11:07 PM      Skin assessed during: Admit      [x] No Altered Skin Integrity Present    []Prevention Measures Documented      [] Yes- Altered Skin Integrity Present or Discovered   [] LDA Added if Not in Epic (Describe Wound)   [] New Altered Skin Integrity was Present on Admit and Documented in LDA   [] Wound Image Taken    Wound Care Consulted? No    Attending Nurse:  Marisela Underwood RN/Staff Member:   RUSH Douglas

## 2023-08-30 NOTE — ASSESSMENT & PLAN NOTE
Patient is identified as having Diastolic (HFpEF) heart failure that is Chronic. CHF is currently controlled. Latest ECHO performed and demonstrates- Results for orders placed during the hospital encounter of 02/07/23    Echo    Interpretation Summary  · The left ventricle is normal in size with normal systolic function. The estimated ejection fraction is 65%.  · Normal right ventricular size with normal right ventricular systolic function.  · Grade II left ventricular diastolic dysfunction.  · Severe left atrial enlargement.  · There is mild aortic valve stenosis. Aortic valve area is 1.8 cm2; peak velocity is 2.9 m/s; mean gradient is 15 mmHg.  · Pulmonary HTN - the estimated PA systolic pressure is > 41mm Hg. (The IVC was not well visualized.)  Continue home Lasix, Toprol XL and Hydralazine to treat.  Monitor clinical status closely. Monitor on telemetry. Patient is off CHF pathway.  Monitor strict Is&Os and daily weights.  Place on fluid restriction of 1.5 L. Continue to stress to patient importance of self efficacy and  on diet for CHF.

## 2023-08-30 NOTE — ASSESSMENT & PLAN NOTE
- continue home prograf and tacrolimus  - daily prograf levels  - KTM consulted ; appreciate recs

## 2023-08-30 NOTE — HPI
67-year-old male with history of CAD, afib, kidney transplant, presented with right third digit pain. Pain started about 5 days prior to admission, progressively worsened to the point where he was unable to move his finger. He denies any trauma to the area. No exposure to dirt, water. Possibly related to insect bite. No animals at home. No outdoor activities. Does housework and intermittent projects in the garage. Patient was started empirically on amp-sulbactam IV. Ortho performed washout this AM.

## 2023-08-30 NOTE — PLAN OF CARE
[facility] - [facility]  Initial Discharge Assessment       Primary Care Provider: Anatoliy Colindres MD    Admission Diagnosis: Flexor tenosynovitis of finger [M65.9]  KATINA (acute kidney injury) [N17.9]  Chest pain [R07.9]    Admission Date: 8/29/2023  Expected Discharge Date: 9/1/2023    Transition of Care Barriers: None    Payor: HU[employer] MANAGED MEDICARE / Plan: HU[employer] MEDICARE HMO / Product Type: Capitation /     Extended Emergency Contact Information  Primary Emergency Contact: Shea Phelps  Address: [address] [place] [address] [facility]  Home Phone: [phone]  Mobile Phone: [phone]  Relation: Spouse  Preferred language: English  Secondary Emergency Contact: Mary Becerra  Address: [address] [place] [address] [facility]  Home Phone: [phone]  Mobile Phone: [phone]  Relation: Daughter    Discharge Plan A: Home with family  Discharge Plan B: Home Health, Home with family      [id number] - [place], [place] - [address]  [address]  [facility] [address]  Phone: [phone] Fax: [phone]    [facility] - [facility] [address]  [address] [facility] [address]  Phone: [phone] Fax: [phone]      Initial Assessment (most recent)       Adult Discharge Assessment - 08/30/23 1411          Discharge Assessment    Assessment Type Discharge Planning Assessment     Confirmed/corrected address, phone number and insurance Yes     Confirmed Demographics Correct on Facesheet     Source of Information patient;family   spouse    Communicated RAMU with patient/caregiver Yes     People in Home spouse     Do you expect to return to your current living situation? Yes     Do you have help at home or someone to help you manage your care at home? Yes     Who are your caregiver(s) and their phone number(s)? Spouse     Prior to hospitilization cognitive status: Alert/Oriented      Current cognitive status: Alert/Oriented     Equipment Currently Used at Home none     Do you currently have service(s) that help you manage your care at home? No     Do you take prescription medications? Yes     Do you have prescription coverage? Yes     Do you have any problems affording any of your prescribed medications? No     Is the patient taking medications as prescribed? yes     How do you get to doctors appointments? car, drives self;family or friend will provide     Are you on dialysis? No     Do you take coumadin? No     DME Needed Upon Discharge  none     Discharge Plan discussed with: Patient;Spouse/sig other     Transition of Care Barriers None     Discharge Plan A Home with family     Discharge Plan B Home Health;Home with family        Physical Activity    On average, how many days per week do you engage in moderate to strenuous exercise (like a brisk walk)? 0 days     On average, how many minutes do you engage in exercise at this level? 0 min        Social Connections    In a typical week, how many times do you talk on the phone with family, friends, or neighbors? More than three times a week     How often do you get together with friends or relatives? Once a week        Alcohol Use    Q1: How often do you have a drink containing alcohol? Never     Q2: How many drinks containing alcohol do you have on a typical day when you are drinking? Patient does not drink                   Patient lives with spouse who is primary caregiver. Patient does not feel he will need any post acute services. He is amenable to home IV therapy if needed- pending ID recommendations. Patient wife to provide transportation home.

## 2023-08-30 NOTE — HPI
Mr. Phelps is a 67 y.o. year old Black male with history of DDRT in July 1992 at [facility] which failed secondary to rejection and he resumed dialysis in April 1996 until receiving DDRT #2 on 4/12/05. He had transplant nephrectomy of DDRT #1. His ibaseline creatinine is between 2.4-3.8 since early 2023. He takes mycophenolate mofetil, prednisone and tacrolimus for maintenance immunosuppression.       presents to the emergency department with chief complaint of right 3rd digit pain and swelling that began 5 days ago.   In the ED patient afebrile and hemodynamically stable saturating well on room air. WBC wnl. ESR and CRP elevated. Orthopedic surgery consulted and evaluated patient with high suspicion for tenosynovitis. MRI ordered, patient started on Unasyn, admitted to the care of medicine for further evaluation and management.   His Cr 3.9 . KTM consulted for CKD and IS management .

## 2023-08-30 NOTE — H&P
"[facility] Medicine  History & Physical    Patient Name: Ibrahima Phelps  MRN: [id number]  Patient Class: IP- Inpatient  Admission Date: 8/29/2023  Attending Physician: Tierney Powell MD   Primary Care Provider: Anatoliy Colindres MD         Patient information was obtained from patient, past medical records and ER records.     Subjective:     Principal Problem:Flexor tenosynovitis of finger    Chief Complaint:   Chief Complaint   Patient presents with    Joint Swelling     R middle finger, "something bit me I guess"        HPI: 67-year-old male with past medical history of CAD, atrial fibrillation, heart failure, CKD, hypertension, hyperlipidemia, kidney transplant on immunosuppressive therapy who presents to the emergency department with chief complaint of right 3rd digit pain and swelling that began 5 days ago.  It has progressively worsened since then.  He has pain with movement of the finger.  He denies trauma or injury.  No insect bite that he is aware of.  He denies symptoms of this nature in the past.  No paresthesias.  No fever, chills, nausea, vomiting.  Denies other worsening or alleviating factors.    In the ED patient afebrile and hemodynamically stable saturating well on room air. WBC wnl. ESR and CRP elevated. Orthopedic surgery consulted and evaluated patient with high suspicion for tenosynovitis. MRI ordered, patient started on Unasyn, admitted to the care of medicine for further evaluation and management.       Past Medical History:   Diagnosis Date    Atrial fibrillation     CAD (coronary artery disease) 2006    MI in 2006    CHF (congestive heart failure) 2017    CKD (chronic kidney disease) stage 3, GFR 30-59 ml/min     Dr. Latif.  in transplanted kidney    COVID-19 2/7/2023    Diverticulosis     Hyperlipidemia     Hypertension     Keloid cicatrix     Metabolic bone disease     Other emphysema 10/1/2019    Pericarditis     S/P kidney transplant 1992    x2 " "(1992 & 2005),    Thrombocytopenia     Thyroid disease     Tobacco use 09/05/2014       Past Surgical History:   Procedure Laterality Date    CARDIOVERSION  04/30/15    CHOLECYSTECTOMY      COLONOSCOPY  April 20, 2011    Diverticulosis, repeat recommended in 3 yrs., repeat colonoscopy 2014 revealed 2 polyps.  He should return in 5 years.    COLONOSCOPY N/A 3/13/2020    Procedure: COLONOSCOPY;  Surgeon: Chon Casper MD;  Location: [facility] ENDO (4TH FLR);  Service: Endoscopy;  Laterality: N/A;  ok to hold coumadin x5days-see telephone encounter 2/4/20-tb    COLONOSCOPY N/A 2/4/2021    Procedure: COLONOSCOPY;  Surgeon: Chon Casper MD;  Location: [facility] ENDO (4TH FLR);  Service: Endoscopy;  Laterality: N/A;  Eliquis - per Dr. GAVIN Blunt ok to hold x 2 days and "restarted asap"- ERW  last prep "poor", [id number] ordered/ Pt requests Dr. Casper  prep ins. mailed - COVID screening on 2/1/21 PCW- ERW    COLONOSCOPY N/A 3/3/2021    Procedure: COLONOSCOPY;  Surgeon: Chon Casper MD;  Location: [facility] ENDO (4TH FLR);  Service: Endoscopy;  Laterality: N/A;  Patient with his second poor bowel pre and has poor prep today.  If patient not intersted or can't get colonoscopy tomorrow will need constipation bowel prep and will need to restart his Eliquis today.Thanks,Chon Blunt-ok to hold Eliquis 2 days prior      COVID     CORONARY ANGIOPLASTY WITH STENT PLACEMENT  9/13/2006    KIDNEY TRANSPLANT  2005    PARATHYROIDECTOMY      TREATMENT OF CARDIAC ARRHYTHMIA N/A 3/28/2022    Procedure: CARDIOVERSION;  Surgeon: Migue Blunt MD;  Location: [facility] EP LAB;  Service: Cardiology;  Laterality: N/A;  AF, DCC, MAC, GP, 3 PREP*RODRIGO deferred pt 100% compliant*       Review of patient's allergies indicates:   Allergen Reactions    Ace inhibitors Swelling    Verapamil Other (See Comments)     Other reaction(s): Unknown    Povidone-iodine Itching       No current facility-administered medications on file prior to " encounter.     Current Outpatient Medications on File Prior to Encounter   Medication Sig    amiodarone (PACERONE) 200 MG Tab Take 1 tablet (200 mg total) by mouth 2 (two) times daily for 14 days, THEN 1 tablet (200 mg total) once daily.    apixaban (ELIQUIS) 5 mg Tab Take 1 tablet (5 mg total) by mouth 2 (two) times daily.    aspirin (ECOTRIN) 81 MG EC tablet Take 1 tablet by mouth Daily.    atorvastatin (LIPITOR) 10 MG tablet TAKE 1 TABLET BY MOUTH EVERY DAY    b complex vitamins (B COMPLEX-VITAMIN B12) tablet Take 1 tablet by mouth once daily.    calcium carbonate (CALCIUM 600 ORAL) Take by mouth. Pt taking 1200 daily    doxazosin (CARDURA) 4 MG tablet Take 1 tablet (4 mg total) by mouth every 12 (twelve) hours.    famotidine (PEPCID) 20 MG tablet Take 1 tablet (20 mg total) by mouth 2 (two) times daily.    furosemide (LASIX) 40 MG tablet Take 20 mg by mouth 2 (two) times daily.    hydrALAZINE (APRESOLINE) 50 MG tablet TAKE 1 TABLET (50 MG TOTAL) BY MOUTH 3 (THREE) TIMES DAILY.    metoprolol succinate (TOPROL-XL) 25 MG 24 hr tablet Take 1 tablet (25 mg total) by mouth once daily.    MITIGARE 0.6 mg Cap TAKE 1 CAPSULE BY MOUTH TWICE A DAY AS NEEDED GOUT FLARE UP TO 3 DAYS    multivitamin (THERAGRAN) per tablet Take 1 tablet by mouth Daily.    NIFEdipine (PROCARDIA-XL) 60 MG (OSM) 24 hr tablet Take 1 tablet (60 mg total) by mouth 2 (two) times a day.    omeprazole (PRILOSEC) 20 MG capsule Take 1 capsule (20 mg total) by mouth once daily.    paricalcitoL (ZEMPLAR) 1 MCG capsule TAKE 1 CAPSULE ON MONDAY, WEDNESDAY AND FRIDAY    predniSONE (DELTASONE) 5 MG tablet TAKE 1 TABLET BY MOUTH EVERY DAY IN THE MORNING    pulse oximeter (PULSE OXIMETER) device by Apply Externally route 2 (two) times a day. Use twice daily at 8 AM and 3 PM and record the value in Protective Systemshart as directed.    tacrolimus (PROGRAF) 1 MG Cap Take 2 capsules (2 mg total) by mouth every morning AND 2 capsules (2 mg total) every evening.     vitamin D 1000 units Tab Take 370 mg by mouth 2 (two) times daily. 2 tablets BID    acetaminophen (TYLENOL) 500 MG tablet Take 1 tablet (500 mg total) by mouth every 6 (six) hours as needed for Pain.    albuterol (PROVENTIL) 2.5 mg /3 mL (0.083 %) nebulizer solution Take 3 mLs (2.5 mg total) by nebulization every 6 (six) hours as needed for Wheezing. Rescue    albuterol (VENTOLIN HFA) 90 mcg/actuation inhaler Inhale 2 puffs into the lungs every 6 (six) hours as needed for Wheezing or Shortness of Breath. Rescue    ammonium lactate 12 % Crea Apply 1 g topically once daily.    fluticasone propion-salmeterol 115-21 mcg/dose (ADVAIR HFA) 115-21 mcg/actuation HFAA inhaler Inhale 2 puffs into the lungs every 12 (twelve) hours. Controller (Patient taking differently: Inhale 2 puffs into the lungs as needed. Controller)    fluticasone propionate (FLONASE) 50 mcg/actuation nasal spray 1 spray (50 mcg total) by Each Nostril route once daily. (Patient taking differently: 1 spray by Each Nostril route as needed.)    gabapentin (NEURONTIN) 100 MG capsule Take 1 capsule (100 mg total) by mouth every evening. 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     Patient is currently retired   Substance and Sexual Activity    Alcohol use: No    Drug use: No    Sexual activity: Yes     Partners: Female      Review of Systems   Constitutional:  Positive for chills. Negative for fatigue and fever.   HENT:  Negative for sore throat and trouble swallowing.    Eyes:  Negative for photophobia and visual disturbance.   Respiratory:  Negative for cough, shortness of breath and wheezing.    Cardiovascular:  Negative for chest pain, palpitations and leg swelling.   Gastrointestinal:  Negative for abdominal distention, abdominal pain, diarrhea, nausea and vomiting.   Genitourinary:  Negative for dysuria and hematuria.   Musculoskeletal:  Positive for arthralgias and joint swelling. Negative for neck pain and neck stiffness.   Skin:  Negative for rash and wound.   Neurological:  Negative for seizures, syncope, weakness, light-headedness and headaches.   Psychiatric/Behavioral:  Negative for confusion and decreased concentration.      Objective:     Vital Signs (Most Recent):  Temp: 97.5 °F (36.4 °C) (08/29/23 1643)  Pulse: 60 (08/29/23 1643)  Resp: 17 (08/29/23 2030)  BP: (!) 144/63 (08/29/23 2029)  SpO2: 98 % (08/29/23 1643) Vital Signs (24h Range):  Temp:  [97.5 °F (36.4 °C)-97.9 °F (36.6 °C)] 97.5 °F (36.4 °C)  Pulse:  [60-75] 60  Resp:  [16-17] 17  SpO2:  [98 %-99 %] 98 %  BP: (138-144)/(61-63) 144/63     Weight: 95.3 kg (210 lb)  Body mass index is 27.71 kg/m².     Physical Exam  Constitutional:       General: He is not in acute distress.     Appearance: He is not toxic-appearing or diaphoretic.   HENT:      Head: Normocephalic and atraumatic.      Nose: Nose normal.   Eyes:      General: No scleral icterus.     Extraocular Movements: Extraocular movements intact.      Pupils: Pupils are equal, round, and reactive to light.   Cardiovascular:      Rate and Rhythm: Normal rate. Rhythm irregular.   Pulmonary:      Effort: Pulmonary effort is normal. No respiratory distress.      Breath sounds: No wheezing or rales.   Abdominal:      General: Abdomen is flat. There is no distension.      Palpations: Abdomen is soft.       Tenderness: There is no abdominal tenderness. There is no guarding.   Musculoskeletal:         General: Normal range of motion.      Cervical back: Normal range of motion and neck supple. No rigidity.      Right lower leg: No edema.      Left lower leg: No edema.      Comments:  Circumferential swelling to the right 3rd digit.  Significant pain with passive extension of the finger.  Tenderness to palpation along flexor tendon.  No active drainage.  Skin intact.  2+ distal pulse.    Skin:     General: Skin is warm and dry.      Coloration: Skin is not jaundiced.   Neurological:      General: No focal deficit present.      Mental Status: He is alert and oriented to person, place, and time.      Cranial Nerves: No cranial nerve deficit.   Psychiatric:         Mood and Affect: Mood normal.         Behavior: Behavior normal.              CRANIAL NERVES     CN III, IV, VI   Pupils are equal, round, and reactive to light.       Significant Labs: All pertinent labs within the past 24 hours have been reviewed.  CBC:   Recent Labs   Lab 08/29/23  1556   WBC 8.17   HGB 9.9*   HCT 31.8*        CMP:   Recent Labs   Lab 08/29/23  1556      K 4.2      CO2 19*   GLU 95   BUN 25*   CREATININE 3.9*   CALCIUM 8.9   PROT 7.5   ALBUMIN 3.2*   BILITOT 0.3   ALKPHOS 78   AST 23   ALT 18   ANIONGAP 11       Significant Imaging: I have reviewed all pertinent imaging results/findings within the past 24 hours.    Assessment/Plan:     * Flexor tenosynovitis of right middle finger  - Orthopedic surgery consulted ; appreciate recs  - start iv unasyn  - holding home ASA and Eliquis  ;  Resume as appropriate  - follow up MRI  - npo midnight with plan for OR in am  - follow up cultures  - further management pending clinical course and future study review    Chronic diastolic CHF (congestive heart failure)  - stable  - continue home po lasix    Other emphysema  - continue home laba-ics  - albuterol prn      Paroxysmal atrial  fibrillation  - continue home amiodarone and metoprolol  - holding home Eliquis for anticipated procedure  ;  Resume as appropriate    H/O kidney transplant  - continue home prograf and tacrolimus  - daily prograf levels  - KTM consulted ; appreciate recs      Mixed hyperlipidemia  - continue home statin      Hypertension  - continue home Cardura, nifedipine, hydralazine, metoprolol        VTE Risk Mitigation (From admission, onward)         Ordered     IP VTE HIGH RISK PATIENT  Once         08/29/23 1827     Place sequential compression device  Until discontinued         08/29/23 1827                           Kalia Villeda MD  Department of Hospital Medicine  [facility] - Surgery

## 2023-08-30 NOTE — ANESTHESIA POSTPROCEDURE EVALUATION
Anesthesia Post Evaluation    Patient: Ibrahima Phelps    Procedure(s) Performed: Procedure(s) (LRB):  IRRIGATION AND DEBRIDEMENT, RIGHT MIDDLE FINGER (Right)    Final Anesthesia Type: general      Patient location during evaluation: PACU  Patient participation: Yes- Able to Participate  Level of consciousness: awake and alert  Post-procedure vital signs: reviewed and stable  Pain management: adequate  Airway patency: patent    PONV status at discharge: No PONV  Anesthetic complications: no      Cardiovascular status: blood pressure returned to baseline  Respiratory status: unassisted, spontaneous ventilation and room air  Hydration status: euvolemic            Vitals Value Taken Time   /74 08/30/23 0918   Temp 36.6 °C (97.9 °F) 08/30/23 0900   Pulse 53 08/30/23 0928   Resp 11 08/30/23 0928   SpO2 100 % 08/30/23 0928   Vitals shown include unvalidated device data.      No case tracking events are documented in the log.      Pain/Stephane Score: Pain Rating Prior to Med Admin: 4 (8/29/2023 11:04 PM)  Pain Rating Post Med Admin: 2 (8/30/2023 12:04 AM)  Stephane Score: 8 (8/30/2023  9:15 AM)

## 2023-08-30 NOTE — ASSESSMENT & PLAN NOTE
· Chronic and controlled. Patient on Advair MDI diskus at home to treat with Duonebs and Albuterol MDI inhaler prn.  · No signs of exacerbation.  · Patient not on home oxygen.   · Advair not on formulary so will substitute with Breo inhaler 1 puff daily to treat.

## 2023-08-30 NOTE — ASSESSMENT & PLAN NOTE
[place] [facility] [place] secondary to [place]. He had transplant nephrectomy of DDRT #1  DDRT #2 on 4/12/05

## 2023-08-31 PROBLEM — M65.949 FLEXOR TENOSYNOVITIS OF FINGER: Status: RESOLVED | Noted: 2023-08-29 | Resolved: 2023-08-31

## 2023-08-31 PROBLEM — M65.9 FLEXOR TENOSYNOVITIS OF FINGER: Status: RESOLVED | Noted: 2023-08-29 | Resolved: 2023-08-31

## 2023-08-31 PROBLEM — R06.6 HICCUPS: Status: ACTIVE | Noted: 2023-08-31

## 2023-08-31 LAB
ALBUMIN SERPL BCP-MCNC: 2.6 G/DL (ref 3.5–5.2)
ALP SERPL-CCNC: 60 U/L (ref 55–135)
ALT SERPL W/O P-5'-P-CCNC: 9 U/L (ref 10–44)
ANION GAP SERPL CALC-SCNC: 12 MMOL/L (ref 8–16)
AST SERPL-CCNC: 18 U/L (ref 10–40)
BASOPHILS # BLD AUTO: 0.02 K/UL (ref 0–0.2)
BASOPHILS NFR BLD: 0.3 % (ref 0–1.9)
BILIRUB SERPL-MCNC: 0.2 MG/DL (ref 0.1–1)
BUN SERPL-MCNC: 34 MG/DL (ref 8–23)
CALCIUM SERPL-MCNC: 8 MG/DL (ref 8.7–10.5)
CHLORIDE SERPL-SCNC: 108 MMOL/L (ref 95–110)
CK SERPL-CCNC: 158 U/L (ref 20–200)
CO2 SERPL-SCNC: 18 MMOL/L (ref 23–29)
CREAT SERPL-MCNC: 4.2 MG/DL (ref 0.5–1.4)
DIFFERENTIAL METHOD: ABNORMAL
EOSINOPHIL # BLD AUTO: 0 K/UL (ref 0–0.5)
EOSINOPHIL NFR BLD: 0.3 % (ref 0–8)
ERYTHROCYTE [DISTWIDTH] IN BLOOD BY AUTOMATED COUNT: 17.2 % (ref 11.5–14.5)
EST. GFR  (NO RACE VARIABLE): 14.6 ML/MIN/1.73 M^2
GLUCOSE SERPL-MCNC: 134 MG/DL (ref 70–110)
HCT VFR BLD AUTO: 28 % (ref 40–54)
HGB BLD-MCNC: 8.6 G/DL (ref 14–18)
IMM GRANULOCYTES # BLD AUTO: 0.07 K/UL (ref 0–0.04)
IMM GRANULOCYTES NFR BLD AUTO: 0.9 % (ref 0–0.5)
LYMPHOCYTES # BLD AUTO: 1.3 K/UL (ref 1–4.8)
LYMPHOCYTES NFR BLD: 17.3 % (ref 18–48)
MAGNESIUM SERPL-MCNC: 1.9 MG/DL (ref 1.6–2.6)
MCH RBC QN AUTO: 24.4 PG (ref 27–31)
MCHC RBC AUTO-ENTMCNC: 30.7 G/DL (ref 32–36)
MCV RBC AUTO: 79 FL (ref 82–98)
MONOCYTES # BLD AUTO: 0.6 K/UL (ref 0.3–1)
MONOCYTES NFR BLD: 7.3 % (ref 4–15)
NEUTROPHILS # BLD AUTO: 5.6 K/UL (ref 1.8–7.7)
NEUTROPHILS NFR BLD: 73.9 % (ref 38–73)
NRBC BLD-RTO: 0 /100 WBC
PHOSPHATE SERPL-MCNC: 4.3 MG/DL (ref 2.7–4.5)
PLATELET # BLD AUTO: 192 K/UL (ref 150–450)
PMV BLD AUTO: 12 FL (ref 9.2–12.9)
POTASSIUM SERPL-SCNC: 3.9 MMOL/L (ref 3.5–5.1)
PROT SERPL-MCNC: 6.1 G/DL (ref 6–8.4)
RBC # BLD AUTO: 3.53 M/UL (ref 4.6–6.2)
SODIUM SERPL-SCNC: 138 MMOL/L (ref 136–145)
TACROLIMUS BLD-MCNC: 3.9 NG/ML (ref 5–15)
WBC # BLD AUTO: 7.57 K/UL (ref 3.9–12.7)

## 2023-08-31 PROCEDURE — 80053 COMPREHEN METABOLIC PANEL: CPT | Performed by: FAMILY MEDICINE

## 2023-08-31 PROCEDURE — 51798 US URINE CAPACITY MEASURE: CPT

## 2023-08-31 PROCEDURE — 84100 ASSAY OF PHOSPHORUS: CPT | Performed by: FAMILY MEDICINE

## 2023-08-31 PROCEDURE — 80197 ASSAY OF TACROLIMUS: CPT | Performed by: FAMILY MEDICINE

## 2023-08-31 PROCEDURE — 25000003 PHARM REV CODE 250: Performed by: HOSPITALIST

## 2023-08-31 PROCEDURE — 99233 PR SUBSEQUENT HOSPITAL CARE,LEVL III: ICD-10-PCS | Mod: ,,, | Performed by: PHYSICIAN ASSISTANT

## 2023-08-31 PROCEDURE — 85025 COMPLETE CBC W/AUTO DIFF WBC: CPT | Performed by: FAMILY MEDICINE

## 2023-08-31 PROCEDURE — 99233 PR SUBSEQUENT HOSPITAL CARE,LEVL III: ICD-10-PCS | Mod: ,,, | Performed by: HOSPITALIST

## 2023-08-31 PROCEDURE — 63600175 PHARM REV CODE 636 W HCPCS: Performed by: INTERNAL MEDICINE

## 2023-08-31 PROCEDURE — 94761 N-INVAS EAR/PLS OXIMETRY MLT: CPT

## 2023-08-31 PROCEDURE — 25000003 PHARM REV CODE 250: Performed by: INTERNAL MEDICINE

## 2023-08-31 PROCEDURE — 25000003 PHARM REV CODE 250: Performed by: STUDENT IN AN ORGANIZED HEALTH CARE EDUCATION/TRAINING PROGRAM

## 2023-08-31 PROCEDURE — 82550 ASSAY OF CK (CPK): CPT | Performed by: INTERNAL MEDICINE

## 2023-08-31 PROCEDURE — 99233 SBSQ HOSP IP/OBS HIGH 50: CPT | Mod: ,,, | Performed by: HOSPITALIST

## 2023-08-31 PROCEDURE — 63600175 PHARM REV CODE 636 W HCPCS: Performed by: FAMILY MEDICINE

## 2023-08-31 PROCEDURE — 83735 ASSAY OF MAGNESIUM: CPT | Performed by: FAMILY MEDICINE

## 2023-08-31 PROCEDURE — 11000001 HC ACUTE MED/SURG PRIVATE ROOM

## 2023-08-31 PROCEDURE — 25000003 PHARM REV CODE 250: Performed by: FAMILY MEDICINE

## 2023-08-31 PROCEDURE — 36415 COLL VENOUS BLD VENIPUNCTURE: CPT | Performed by: FAMILY MEDICINE

## 2023-08-31 PROCEDURE — 99233 SBSQ HOSP IP/OBS HIGH 50: CPT | Mod: ,,, | Performed by: PHYSICIAN ASSISTANT

## 2023-08-31 RX ORDER — SODIUM CHLORIDE 9 MG/ML
INJECTION, SOLUTION INTRAVENOUS CONTINUOUS
Status: ACTIVE | OUTPATIENT
Start: 2023-08-31 | End: 2023-09-02

## 2023-08-31 RX ORDER — GABAPENTIN 300 MG/1
300 CAPSULE ORAL ONCE
Status: COMPLETED | OUTPATIENT
Start: 2023-08-31 | End: 2023-08-31

## 2023-08-31 RX ORDER — CHLORPROMAZINE HYDROCHLORIDE 25 MG/1
25 TABLET, FILM COATED ORAL ONCE
Status: COMPLETED | OUTPATIENT
Start: 2023-08-31 | End: 2023-08-31

## 2023-08-31 RX ADMIN — POLYETHYLENE GLYCOL 3350 17 G: 17 POWDER, FOR SOLUTION ORAL at 09:08

## 2023-08-31 RX ADMIN — METOPROLOL SUCCINATE 25 MG: 25 TABLET, EXTENDED RELEASE ORAL at 10:08

## 2023-08-31 RX ADMIN — DOXAZOSIN 4 MG: 2 TABLET ORAL at 10:08

## 2023-08-31 RX ADMIN — HYDRALAZINE HYDROCHLORIDE 50 MG: 50 TABLET ORAL at 03:08

## 2023-08-31 RX ADMIN — SODIUM CHLORIDE: 9 INJECTION, SOLUTION INTRAVENOUS at 06:08

## 2023-08-31 RX ADMIN — DOXAZOSIN 4 MG: 2 TABLET ORAL at 08:08

## 2023-08-31 RX ADMIN — ACETAMINOPHEN 1000 MG: 500 TABLET ORAL at 09:08

## 2023-08-31 RX ADMIN — FUROSEMIDE 20 MG: 20 TABLET ORAL at 10:08

## 2023-08-31 RX ADMIN — TACROLIMUS 2 MG: 1 CAPSULE ORAL at 10:08

## 2023-08-31 RX ADMIN — OXYCODONE HYDROCHLORIDE 5 MG: 5 TABLET ORAL at 08:08

## 2023-08-31 RX ADMIN — OXYCODONE HYDROCHLORIDE 5 MG: 5 TABLET ORAL at 10:08

## 2023-08-31 RX ADMIN — METHOCARBAMOL 500 MG: 500 TABLET ORAL at 03:08

## 2023-08-31 RX ADMIN — PREDNISONE 5 MG: 5 TABLET ORAL at 10:08

## 2023-08-31 RX ADMIN — ATORVASTATIN CALCIUM 10 MG: 10 TABLET, FILM COATED ORAL at 10:08

## 2023-08-31 RX ADMIN — TACROLIMUS 2 MG: 1 CAPSULE ORAL at 09:08

## 2023-08-31 RX ADMIN — ACETAMINOPHEN 1000 MG: 500 TABLET ORAL at 03:08

## 2023-08-31 RX ADMIN — NIFEDIPINE 60 MG: 30 TABLET, FILM COATED, EXTENDED RELEASE ORAL at 08:08

## 2023-08-31 RX ADMIN — CHLORPROMAZINE HYDROCHLORIDE 25 MG: 25 TABLET, FILM COATED ORAL at 12:08

## 2023-08-31 RX ADMIN — HYDRALAZINE HYDROCHLORIDE 50 MG: 50 TABLET ORAL at 10:08

## 2023-08-31 RX ADMIN — AMIODARONE HYDROCHLORIDE 200 MG: 200 TABLET ORAL at 10:08

## 2023-08-31 RX ADMIN — PANTOPRAZOLE SODIUM 40 MG: 40 TABLET, DELAYED RELEASE ORAL at 10:08

## 2023-08-31 RX ADMIN — METHOCARBAMOL 500 MG: 500 TABLET ORAL at 10:08

## 2023-08-31 RX ADMIN — NIFEDIPINE 60 MG: 30 TABLET, FILM COATED, EXTENDED RELEASE ORAL at 10:08

## 2023-08-31 RX ADMIN — HYDRALAZINE HYDROCHLORIDE 50 MG: 50 TABLET ORAL at 08:08

## 2023-08-31 RX ADMIN — GABAPENTIN 300 MG: 300 CAPSULE ORAL at 08:08

## 2023-08-31 RX ADMIN — CEFTRIAXONE 2 G: 2 INJECTION, POWDER, FOR SOLUTION INTRAMUSCULAR; INTRAVENOUS at 06:08

## 2023-08-31 RX ADMIN — ACETAMINOPHEN 1000 MG: 500 TABLET ORAL at 06:08

## 2023-08-31 RX ADMIN — METHOCARBAMOL 500 MG: 500 TABLET ORAL at 08:08

## 2023-08-31 RX ADMIN — OXYCODONE HYDROCHLORIDE 5 MG: 5 TABLET ORAL at 04:08

## 2023-08-31 NOTE — ASSESSMENT & PLAN NOTE
DDRT #1 in July 1992 at [facility] which failed secondary to rejection. He had transplant nephrectomy of DDRT #1  DDRT #2 on 4/12/05

## 2023-08-31 NOTE — ASSESSMENT & PLAN NOTE
Patient is identified as having Diastolic (HFpEF) heart failure that is Chronic. CHF is currently controlled. Latest ECHO performed and demonstrates- Results for orders placed during the hospital encounter of 02/07/23    Echo    Interpretation Summary  · The left ventricle is normal in size with normal systolic function. The estimated ejection fraction is 65%.  · Normal right ventricular size with normal right ventricular systolic function.  · Grade II left ventricular diastolic dysfunction.  · Severe left atrial enlargement.  · There is mild aortic valve stenosis. Aortic valve area is 1.8 cm2; peak velocity is 2.9 m/s; mean gradient is 15 mmHg.  · Pulmonary HTN - the estimated PA systolic pressure is > 41mm Hg. (The IVC was not well visualized.)  Hold lasix on 8/31 per renal recs  Toprol XL and Hydralazine to treat.  Monitor clinical status closely. Monitor on telemetry. Patient is off CHF pathway.  Monitor strict Is&Os and daily weights.  Place on fluid restriction of 1.5 L. Continue to stress to patient importance of self efficacy and  on diet for CHF.

## 2023-08-31 NOTE — SUBJECTIVE & OBJECTIVE
Subjective:   History of Present Illness:  Mr. Phelps is a 67 y.o. year old Black male with [place] [facility] [place] to [place] and he resumed dialysis in April 1996 until receiving DDRT #2 on 4/12/05. He had transplant nephrectomy of DDRT #1. His ibaseline creatinine is between 2.4-3.8 since early 2023. He takes mycophenolate mofetil, prednisone and tacrolimus for maintenance immunosuppression.       presents to the emergency department with chief complaint of right 3rd digit pain and swelling that began 5 days ago.   In the ED patient afebrile and hemodynamically stable saturating well on room air. WBC wnl. ESR and CRP elevated. Orthopedic surgery consulted and evaluated patient with high suspicion for tenosynovitis. MRI ordered, patient started on Unasyn, admitted to the care of medicine for further evaluation and management.   His Cr 3.9 . KTM consulted for CKD and IS management .        Hospital Course Ortho consulted for right middle finger tenosynovitis and patient taken to OR 8/30 by Dr. Martin Larsen and underwent arthrotomy of right middle finger proximal interphalangeal joint with lavage for septic arthritis. There was some murky fluid that was expressed from right middle finger. Fluid sent for cultures. Cr elevated. Kidney US ordered.     Interval History: NAEON. Cr slowly rising. Bladder scan with 300 cc and patient able to void 300 cc right after. Kidney US unremarkable. UA negative. Recommend holding lasix and hydrating with 1L IVF over next 24 hours. Cont to hold cellcept. Cont to monitor tacrolimus level and adjust as needed. Cont prednisone.     Past Medical, Surgical, Family, and Social History:   Unchanged from H&P.    Scheduled Meds:   acetaminophen  1,000 mg Oral Q8H    amiodarone  200 mg Oral Daily    atorvastatin  10 mg Oral Daily    cefTRIAXone (ROCEPHIN) IVPB  2 g Intravenous Q24H    DAPTOmycin (CUBICIN) IV (PEDS and ADULTS)  8 mg/kg Intravenous  Q24H    doxazosin  4 mg Oral Q12H    fluticasone furoate-vilanteroL  1 puff Inhalation Daily    furosemide  20 mg Oral BID    hydrALAZINE  50 mg Oral TID    methocarbamoL  500 mg Oral QID    metoprolol succinate  25 mg Oral Daily    NIFEdipine  60 mg Oral BID    pantoprazole  40 mg Oral Daily    polyethylene glycol  17 g Oral Daily    predniSONE  5 mg Oral Daily    tacrolimus  2 mg Oral BID     Continuous Infusions:  PRN Meds:albuterol **AND** MDI Q4H PRN, aluminum-magnesium hydroxide-simethicone, dextrose 10%, dextrose 10%, glucagon (human recombinant), glucose, glucose, melatonin, naloxone, ondansetron, oxyCODONE, sodium chloride 0.9%    Intake/Output - Last 3 Shifts         08/29 0700 08/30 0659 08/30 0700 08/31 0659 08/31 0700 09/01 0659    P.O.  345     IV Piggyback 500 250     Total Intake(mL/kg) 500 (5.2) 595 (6.2)     Urine (mL/kg/hr)  1500 (0.7)     Blood  0     Total Output  1500     Net +500 -905                     Review of Systems   Constitutional:  Negative for fatigue and fever.   HENT:  Negative for sore throat and trouble swallowing.    Eyes:  Negative for photophobia and visual disturbance.   Respiratory:  Negative for cough, shortness of breath and wheezing.    Cardiovascular:  Negative for chest pain, palpitations and leg swelling.   Gastrointestinal:  Negative for abdominal distention, abdominal pain, diarrhea, nausea and vomiting.   Genitourinary:  Negative for decreased urine volume, difficulty urinating, dysuria and hematuria.   Musculoskeletal:  Positive for arthralgias and joint swelling. Negative for neck pain and neck stiffness.   Skin:  Negative for rash and wound.   Allergic/Immunologic: Positive for immunocompromised state.   Neurological:  Negative for seizures, syncope, weakness, light-headedness and headaches.   Psychiatric/Behavioral:  Negative for confusion and decreased concentration.       Objective:     Vital Signs (Most Recent):  Temp: 98.1 °F (36.7 °C) (08/31/23  "0803)  Pulse: 61 (08/31/23 0803)  Resp: 18 (08/31/23 0803)  BP: (!) 144/67 (08/31/23 0803)  SpO2: 99 % (08/31/23 0803) Vital Signs (24h Range):  Temp:  [97.7 °F (36.5 °C)-98.3 °F (36.8 °C)] 98.1 °F (36.7 °C)  Pulse:  [52-61] 61  Resp:  [12-18] 18  SpO2:  [97 %-100 %] 99 %  BP: (144-175)/(67-81) 144/67     Weight: 95.3 kg (210 lb)  Height: 6' 1" (185.4 cm)  Body mass index is 27.71 kg/m².     Physical Exam  Constitutional:       General: He is not in acute distress.     Appearance: He is well-developed. He is not diaphoretic.   HENT:      Head: Normocephalic and atraumatic.      Nose: Nose normal.   Eyes:      Conjunctiva/sclera: Conjunctivae normal.   Pulmonary:      Effort: Pulmonary effort is normal. No respiratory distress.   Abdominal:      General: Abdomen is flat. Bowel sounds are normal. There is no distension.      Palpations: Abdomen is soft.      Tenderness: There is no abdominal tenderness.   Musculoskeletal:      Cervical back: Normal range of motion.      Right lower leg: No edema.      Left lower leg: No edema.      Comments: Right third finger in cast   Skin:     General: Skin is warm and dry.      Findings: No erythema or rash.   Neurological:      Mental Status: He is alert.   Psychiatric:         Behavior: Behavior normal.          Laboratory:  CBC:   Recent Labs   Lab 08/29/23  1556 08/30/23  0428 08/31/23 0424   WBC 8.17 7.07 7.57   RBC 4.09* 3.34* 3.53*   HGB 9.9* 8.0* 8.6*   HCT 31.8* 25.6* 28.0*    171 192   MCV 78* 77* 79*   MCH 24.2* 24.0* 24.4*   MCHC 31.1* 31.3* 30.7*     CMP:   Recent Labs   Lab 08/29/23  1556 08/30/23  0428 08/31/23 0424   GLU 95 80 134*   CALCIUM 8.9 8.1* 8.0*   ALBUMIN 3.2* 2.6* 2.6*   PROT 7.5 5.9* 6.1    142 138   K 4.2 4.1 3.9   CO2 19* 20* 18*    112* 108   BUN 25* 28* 34*   CREATININE 3.9* 3.8* 4.2*   ALKPHOS 78 54* 60   ALT 18 12 9*   AST 23 19 18       Diagnostic Results: Kidney US reviewed.   "

## 2023-08-31 NOTE — PROGRESS NOTES
[facility] - [facility]  Orthopedics  Progress Note    Patient Name: Ibrahima Phelps  MRN: [id number]  Admission Date: 8/29/2023  Hospital Length of Stay: 2 days  Attending Provider: Sarah Braga MD  Primary Care Provider: Anatoliy Colindres MD  Follow-up For: Procedure(s) (LRB):  IRRIGATION AND DEBRIDEMENT, RIGHT MIDDLE FINGER (Right)    Post-Operative Day: 1 Day Post-Op  Subjective:     Principal Problem:Pyogenic arthritis of right hand    Principal Orthopedic Problem: Same    Interval History: Pt seen and examined at bedside. NAEON. Pain controlled.  VSS, AF. No other concerns stated.     Review of patient's allergies indicates:   Allergen Reactions    Ace inhibitors Swelling    Verapamil Other (See Comments)     Other reaction(s): Unknown    Povidone-iodine Itching       Current Facility-Administered Medications   Medication    acetaminophen tablet 1,000 mg    albuterol inhaler 2 puff    aluminum-magnesium hydroxide-simethicone 200-200-20 mg/5 mL suspension 30 mL    amiodarone tablet 200 mg    atorvastatin tablet 10 mg    cefTRIAXone (ROCEPHIN) 2 g in dextrose 5 % in water (D5W) 100 mL IVPB (MB+)    DAPTOmycin (CUBICIN) 760 mg in sodium chloride 0.9% SolP 50 mL IVPB    dextrose 10% bolus 125 mL 125 mL    dextrose 10% bolus 250 mL 250 mL    doxazosin tablet 4 mg    fluticasone furoate-vilanteroL 200-25 mcg/dose diskus inhaler 1 puff    furosemide tablet 20 mg    glucagon (human recombinant) injection 1 mg    glucose chewable tablet 16 g    glucose chewable tablet 24 g    hydrALAZINE tablet 50 mg    melatonin tablet 6 mg    methocarbamoL tablet 500 mg    metoprolol succinate (TOPROL-XL) 24 hr tablet 25 mg    naloxone 0.4 mg/mL injection 0.02 mg    NIFEdipine 24 hr tablet 60 mg    ondansetron injection 4 mg    oxyCODONE immediate release tablet 5 mg    pantoprazole EC tablet 40 mg    polyethylene glycol packet 17 g    predniSONE tablet 5 mg    sodium chloride 0.9% flush 10 mL     "tacrolimus capsule 2 mg     Objective:     Vital Signs (Most Recent):  Temp: 98.1 °F (36.7 °C) (08/31/23 0803)  Pulse: 61 (08/31/23 0803)  Resp: 18 (08/31/23 0803)  BP: (!) 144/67 (08/31/23 0803)  SpO2: 99 % (08/31/23 0803) Vital Signs (24h Range):  Temp:  [97.7 °F (36.5 °C)-98.3 °F (36.8 °C)] 98.1 °F (36.7 °C)  Pulse:  [50-61] 61  Resp:  [9-24] 18  SpO2:  [97 %-100 %] 99 %  BP: (115-175)/(58-81) 144/67     Weight: 95.3 kg (210 lb)  Height: 6' 1" (185.4 cm)  Body mass index is 27.71 kg/m².      Intake/Output Summary (Last 24 hours) at 8/31/2023 0854  Last data filed at 8/31/2023 0650  Gross per 24 hour   Intake 345 ml   Output 1500 ml   Net -1155 ml        Ortho/SPM Exam      Right Upper Extremity    -Dressing c/d/i  -ATTP  - Long finger stiff  -Compartments soft and compressible  -ROM full (shoulder/elbow/wrist)  -SILT M/R/U  -Motor intact Ain/PIN/U/M  -Brisk cap refill  -Warm well perfused extremities  -2+ Radial palpable       Significant Labs: BMP:   Recent Labs   Lab 08/31/23  0424   *      K 3.9      CO2 18*   BUN 34*   CREATININE 4.2*   CALCIUM 8.0*   MG 1.9     CBC:   Recent Labs   Lab 08/29/23  1556 08/30/23  0428 08/31/23  0424   WBC 8.17 7.07 7.57   HGB 9.9* 8.0* 8.6*   HCT 31.8* 25.6* 28.0*    171 192     CMP:   Recent Labs   Lab 08/29/23  1556 08/30/23  0428 08/31/23  0424    142 138   K 4.2 4.1 3.9    112* 108   CO2 19* 20* 18*   GLU 95 80 134*   BUN 25* 28* 34*   CREATININE 3.9* 3.8* 4.2*   CALCIUM 8.9 8.1* 8.0*   PROT 7.5 5.9* 6.1   ALBUMIN 3.2* 2.6* 2.6*   BILITOT 0.3 0.3 0.2   ALKPHOS 78 54* 60   AST 23 19 18   ALT 18 12 9*   ANIONGAP 11 10 12     All pertinent labs within the past 24 hours have been reviewed.    Significant Imaging: I have reviewed all pertinent imaging results/findings.    Assessment/Plan:     * Pyogenic arthritis of right hand  Ibrahima Phelps is a 67 y.o. male s/p R LF PIP washout 8/30.      - Weight bearing status: WBAT  - Pain control: Per " APS  - Antibiotics: Dapto/ rocephin  - OT  - Cx: NGTD  - Dispo: Dressing down tomorrow               Hernandez Mayer MD  Orthopedics  [facility] - Surgery

## 2023-08-31 NOTE — ASSESSMENT & PLAN NOTE
-presented with swelling and pain with fluid seen on MRI on admit with washout by ortho done. WBAT, ROMAT  -on dapto/rocephin now per ID recs, CX without growth so far, outpatient OT rec  -cannot get picc if needs IV long term, so hopefully will get midline approval if does need IV once out of hospital, will f/u ID recs further

## 2023-08-31 NOTE — PROGRESS NOTES
[facility], An Acute Care Hospital Medicine  Progress Note    Patient Name: Ibrahima Phelps  MRN: [id number]  Patient Class: IP- Inpatient   Admission Date: 8/29/2023  Length of Stay: 2 days  Attending Physician: Sarah Braga MD  Primary Care Provider: Anatoliy Colindres MD        Subjective:     Principal Problem:Pyogenic arthritis of right hand        HPI:  67-year-old male with past medical history of CAD, atrial fibrillation, heart failure, CKD, hypertension, hyperlipidemia, kidney transplant on immunosuppressive therapy who presents to the emergency department with chief complaint of right 3rd digit pain and swelling that began 5 days ago.  It has progressively worsened since then.  He has pain with movement of the finger.  He denies trauma or injury.  No insect bite that he is aware of.  He denies symptoms of this nature in the past.  No paresthesias.  No fever, chills, nausea, vomiting.  Denies other worsening or alleviating factors.    In the ED patient afebrile and hemodynamically stable saturating well on room air. WBC wnl. ESR and CRP elevated. Orthopedic surgery consulted and evaluated patient with high suspicion for tenosynovitis. MRI ordered, patient started on Unasyn, admitted to the care of medicine for further evaluation and management.       Overview/Hospital Course:  MRI confirmed flexor tenosynovitis of right middle finger. Ortho consulted and patient taken to OR on 8/30/202 by Dr. Martin Larsen and underwent arthrotomy of right middle finger proximal interphalangeal joint with lavage for septic arthritis there was some murky fluid that was expressed from right middle finger. Fluid sent for cultures. Infectious Disease consulted to assist in antibiotic management. Kidney transplant medicine consulted on admit as patient having increased creatinine above baseline. Patient known kidney transplant patient with post transplant CKD stage 4 with creatinine 2.5-3.1 but creatinine 3.8 on admit.        Interval History: he reports hungry this morning and eating breakfast with wife at bedside. At home he usually takes his prograf at 8 am and 8 pm and has been receiving here at 10 am and 10 pm so will f/u with renal to see if this is an issue with prograf level testing at all and if needs closer paramaters on timing of administration of meds per their recs. They rec today to hold lasix further and give light IVF today for a total of 1 L over 24 hours so placed on 50 cc/NS an hour for 24 hours now, as Cr uptick a little more today to 4.2 with baseline aroudn 2.4 to 3.1 with 3.8 Cr yesterday. Following prograf levels further, they report have been off MMF for awhile now just the pred and prograf are meds he has been taking now. No signs of UTI on UA, he reports occasionally urine has a foul smell to it that goes away spontaeously on its own. On allof home BP meds now with improvement in BP and US with similar findings to before with elevated resistant index and 2 grams protein in urine. He was hoping to be able to go home sooner rather than later but I let them know that f finger is iproved he may still have to stay through weekend to get kidney function stabilized and id expect to be here a few more days either way this weekend to get final ID recs and to watch Cr a few more days. Hopefully he will be able to get a midline if the plan is for IV antibiotics given his advanecd kidney disease he would not be able to get a PICC as an option if he does need long term IV antibiotics, so gretta f/u ID recs further if he does need IV. Outpatient OT rec once med stable. On rocephin/dapto now for coverage.      Review of Systems   Constitutional:  Negative for fever.   Respiratory:  Negative for cough and shortness of breath.    Cardiovascular:  Negative for chest pain.   Gastrointestinal:  Negative for abdominal pain, diarrhea and nausea.   Musculoskeletal:  Positive for arthralgias (Right middle finger).   Neurological:   Negative for dizziness.   Psychiatric/Behavioral:  Negative for agitation and confusion.      Objective:     Vital Signs (Most Recent):  Temp: 98.1 °F (36.7 °C) (08/30/23 1200)  Pulse: 56 (08/30/23 1200)  Resp: 12 (08/30/23 1200)  BP: 155/72 (08/30/23 1200)  SpO2: 98 % (08/30/23 1200) on room air Vital Signs (24h Range):  Temp:  [96.6 °F (35.9 °C)-98.3 °F (36.8 °C)] 96.6 °F (35.9 °C)  Pulse:  [56-63] 61  Resp:  [12-18] 17  SpO2:  [97 %-99 %] 99 %  BP: (144-174)/(67-79) 150/68     Weight: 95.3 kg (210 lb)  Body mass index is 27.71 kg/m².    Intake/Output Summary (Last 24 hours) at 8/31/2023 1156  Last data filed at 8/31/2023 0650  Gross per 24 hour   Intake 230 ml   Output 1500 ml   Net -1270 ml           Physical Exam  Vitals and nursing note reviewed.   Constitutional:       General: He is awake. He is not in acute distress.     Appearance: Normal appearance. He is normal weight. He is not ill-appearing.   Cardiovascular:      Rate and Rhythm: Normal rate and regular rhythm.      Heart sounds: Normal heart sounds. No murmur heard.     No friction rub. No gallop.   Pulmonary:      Effort: Pulmonary effort is normal. No respiratory distress.      Breath sounds: Normal breath sounds. No wheezing.   Abdominal:      General: Abdomen is flat. Bowel sounds are normal. There is no distension.      Palpations: Abdomen is soft.      Tenderness: There is no abdominal tenderness.   Musculoskeletal:      Right lower leg: No edema.      Left lower leg: No edema.   Skin:     Findings: No erythema.      Comments: Right hand and right middle finger wrapped in surgical bandage   Neurological:      Mental Status: He is alert and oriented to person, place, and time.   Psychiatric:         Mood and Affect: Mood normal.         Behavior: Behavior normal. Behavior is cooperative.         Thought Content: Thought content normal.         Judgment: Judgment normal.             Significant Labs: CBC:   Recent Labs   Lab 08/29/23  4386  08/30/23  0428 08/31/23  0424   WBC 8.17 7.07 7.57   HGB 9.9* 8.0* 8.6*   HCT 31.8* 25.6* 28.0*    171 192       CMP:   Recent Labs   Lab 08/29/23  1556 08/30/23  0428 08/31/23  0424    142 138   K 4.2 4.1 3.9    112* 108   CO2 19* 20* 18*   GLU 95 80 134*   BUN 25* 28* 34*   CREATININE 3.9* 3.8* 4.2*   CALCIUM 8.9 8.1* 8.0*   PROT 7.5 5.9* 6.1   ALBUMIN 3.2* 2.6* 2.6*   BILITOT 0.3 0.3 0.2   ALKPHOS 78 54* 60   AST 23 19 18   ALT 18 12 9*   ANIONGAP 11 10 12       Sed Rate   Date Value Ref Range Status   08/29/2023 >120 (H) 0 - 23 mm/Hr Final     CRP   Date Value Ref Range Status   08/29/2023 17.7 (H) 0.0 - 8.2 mg/L Final     Recent Labs     08/31/23  0424   TACROLIMUS 3.9*          Significant Imaging: I have reviewed all pertinent imaging results/findings within the past 24 hours.      Assessment/Plan:      * Pyogenic arthritis of right hand  -presented with swelling and pain with fluid seen on MRI on admit with washout by ortho done. WBAT, ROMAT  -on dapto/rocephin now per ID recs, CX without growth so far, outpatient OT rec  -cannot get picc if needs IV long term, so hopefully will get midline approval if does need IV once out of hospital, will f/u ID recs further      Hiccups  Thorazine x 1 as reports not improving on 8/31 with monitoring      Anemia in stage 4 chronic kidney disease  · Patient with known chronic anemia due to his advanced kidney disease. Baseline Hgb 9-10.  · Hgb 8.0 on 8/30.  · Patient has no signs of active bleeding and hemodynamically stable. Patient is asymptomatic related to anemia.   · Goal is keep Hgb >7 and consider blood transfusion if Hgb < 7 and asymptomatic or < 8 if patient becomes symptomatic.  · Plan is to monitor daily CBC.    Chronic diastolic CHF (congestive heart failure)  Patient is identified as having Diastolic (HFpEF) heart failure that is Chronic. CHF is currently controlled. Latest ECHO performed and demonstrates- Results for orders placed during  the hospital encounter of 02/07/23    Echo    Interpretation Summary  · The left ventricle is normal in size with normal systolic function. The estimated ejection fraction is 65%.  · Normal right ventricular size with normal right ventricular systolic function.  · Grade II left ventricular diastolic dysfunction.  · Severe left atrial enlargement.  · There is mild aortic valve stenosis. Aortic valve area is 1.8 cm2; peak velocity is 2.9 m/s; mean gradient is 15 mmHg.  · Pulmonary HTN - the estimated PA systolic pressure is > 41mm Hg. (The IVC was not well visualized.)  Hold lasix on 8/31 per renal recs  Toprol XL and Hydralazine to treat.  Monitor clinical status closely. Monitor on telemetry. Patient is off CHF pathway.  Monitor strict Is&Os and daily weights.  Place on fluid restriction of 1.5 L. Continue to stress to patient importance of self efficacy and  on diet for CHF.    Acute renal failure superimposed on stage 4 chronic kidney disease  · Patient's baseline creatinine 2.7-3.2 consistent with CKD stage 4 but creatinine 3.8 on admit--> 4.2 now Patient has had creatinines this high in past 1 year. Patient with previous kidney transplant and last in 2005.   · US showing elevated resistance indices, Pr/Cr 2 grams.   · Consult Kidney Transplant medicine- rec hold lasix today and light IVF for 1L/24 hours. .   · Strict I+Os to closely monitor urine output.   · Avoid any nephrotoxic agents and meds.   · Renally adjust all medications based on GFR.   · Monitor daily BMP to follow renal function.     Other emphysema  · Chronic and controlled. Patient on Advair MDI diskus at home to treat with Duonebs and Albuterol MDI inhaler prn.  · No signs of exacerbation.  · Patient not on home oxygen.   · Advair not on formulary so will substitute with Breo inhaler 1 puff daily to treat.     Paroxysmal atrial fibrillation  · Patient in sinus rhythm on admit. Rate controled. Continue home Amiodarone for rhythm control and  Toprol XL for rate control.  · Patient on long term anticoagulation with Apixiban as outpatient but held for surgery. Will hold for now in case any further surgery needed for right middle finger but if Ortho has no further plans for another washout then likely can resume home Apixiban in next 1-2 days.     H/O kidney transplant  Immunodeficiency due to treatment with immunosuppressive medication  - Continue home Tacrolimus and Prednisone to treat for chronic immunosuppression.   - Monitor daily Tacrolimus levels in hospital.   - Kidney Transplant Medicine team consulted to assist in immunosuppression.   Reports was not on MMF recently      Mixed hyperlipidemia  Chronic and controlled. Continue home Lipitor 10 mg po daily to treat.       Primary hypertension  Chronic and controlled. Continue home Cardura, Nifedipine, Hydralazine and Metoprolol to treat. Monitor vital signs every 4 hours. Target BP < 140/90.         VTE Risk Mitigation (From admission, onward)         Ordered     IP VTE HIGH RISK PATIENT  Once         08/29/23 1827     Place sequential compression device  Until discontinued         08/29/23 1827                Discharge Planning   RAMU: 9/1/2023     Code Status: Full Code   Is the patient medically ready for discharge?: No    Reason for patient still in hospital (select all that apply): Patient trending condition  Discharge Plan A: Home with family                  Sarah Braga MD  Department of Hospital Medicine   [facility] - Surgery

## 2023-08-31 NOTE — PROGRESS NOTES
[facility] - Surgery  Kidney Transplant  Progress Note      Reason for Follow-up: Reassessment of Kidney Transplant - 4/12/2005 - Not Followed  (#2) recipient and management of immunosuppression.    ORGAN: LEFT KIDNEY    Donor Type:     PHS Increased Risk:   Cold Ischemia:   Induction Medications:       Subjective:   History of Present Illness:  Mr. Phelps is a 67 y.o. year old Black male with history of DDRT in July 1992 at [facility] which failed secondary to rejection and he resumed dialysis in April 1996 until receiving DDRT #2 on 4/12/05. He had transplant nephrectomy of DDRT #1. His ibaseline creatinine is between 2.4-3.8 since early 2023. He takes mycophenolate mofetil, prednisone and tacrolimus for maintenance immunosuppression.       presents to the emergency department with chief complaint of right 3rd digit pain and swelling that began 5 days ago.   In the ED patient afebrile and hemodynamically stable saturating well on room air. WBC wnl. ESR and CRP elevated. Orthopedic surgery consulted and evaluated patient with high suspicion for tenosynovitis. MRI ordered, patient started on Unasyn, admitted to the care of medicine for further evaluation and management.   His Cr 3.9 . KTM consulted for CKD and IS management .        Hospital Course Ortho consulted for right middle finger tenosynovitis and patient taken to OR 8/30 by Dr. Martin Larsen and underwent arthrotomy of right middle finger proximal interphalangeal joint with lavage for septic arthritis. There was some murky fluid that was expressed from right middle finger. Fluid sent for cultures. Cr elevated. Kidney US ordered.     Interval History: NAEON. Cr slowly rising. Bladder scan with 300 cc and patient able to void 300 cc right after. Kidney US unremarkable. UA unremarkable. Recommend holding lasix and hydrating with 1L IVF over next 24 hours. Cont to hold cellcept. Cont to monitor tacrolimus level and adjust as needed. Cont prednisone.     Past  Medical, Surgical, Family, and Social History:   Unchanged from H&P.    Scheduled Meds:   acetaminophen  1,000 mg Oral Q8H    amiodarone  200 mg Oral Daily    atorvastatin  10 mg Oral Daily    cefTRIAXone (ROCEPHIN) IVPB  2 g Intravenous Q24H    DAPTOmycin (CUBICIN) IV (PEDS and ADULTS)  8 mg/kg Intravenous Q24H    doxazosin  4 mg Oral Q12H    fluticasone furoate-vilanteroL  1 puff Inhalation Daily    furosemide  20 mg Oral BID    hydrALAZINE  50 mg Oral TID    methocarbamoL  500 mg Oral QID    metoprolol succinate  25 mg Oral Daily    NIFEdipine  60 mg Oral BID    pantoprazole  40 mg Oral Daily    polyethylene glycol  17 g Oral Daily    predniSONE  5 mg Oral Daily    tacrolimus  2 mg Oral BID     Continuous Infusions:  PRN Meds:albuterol **AND** MDI Q4H PRN, aluminum-magnesium hydroxide-simethicone, dextrose 10%, dextrose 10%, glucagon (human recombinant), glucose, glucose, melatonin, naloxone, ondansetron, oxyCODONE, sodium chloride 0.9%    Intake/Output - Last 3 Shifts         08/29 0700  08/30 0659 08/30 0700 08/31 0659 08/31 0700 09/01 0659    P.O.  345     IV Piggyback 500 250     Total Intake(mL/kg) 500 (5.2) 595 (6.2)     Urine (mL/kg/hr)  1500 (0.7)     Blood  0     Total Output  1500     Net +500 -905                     Review of Systems   Constitutional:  Negative for fatigue and fever.   HENT:  Negative for sore throat and trouble swallowing.    Eyes:  Negative for photophobia and visual disturbance.   Respiratory:  Negative for cough, shortness of breath and wheezing.    Cardiovascular:  Negative for chest pain, palpitations and leg swelling.   Gastrointestinal:  Negative for abdominal distention, abdominal pain, diarrhea, nausea and vomiting.   Genitourinary:  Negative for decreased urine volume, difficulty urinating, dysuria and hematuria.   Musculoskeletal:  Positive for arthralgias and joint swelling. Negative for neck pain and neck stiffness.   Skin:  Negative for rash and wound.  "  Allergic/Immunologic: Positive for immunocompromised state.   Neurological:  Negative for seizures, syncope, weakness, light-headedness and headaches.   Psychiatric/Behavioral:  Negative for confusion and decreased concentration.       Objective:     Vital Signs (Most Recent):  Temp: 98.1 °F (36.7 °C) (08/31/23 0803)  Pulse: 61 (08/31/23 0803)  Resp: 18 (08/31/23 0803)  BP: (!) 144/67 (08/31/23 0803)  SpO2: 99 % (08/31/23 0803) Vital Signs (24h Range):  Temp:  [97.7 °F (36.5 °C)-98.3 °F (36.8 °C)] 98.1 °F (36.7 °C)  Pulse:  [52-61] 61  Resp:  [12-18] 18  SpO2:  [97 %-100 %] 99 %  BP: (144-175)/(67-81) 144/67     Weight: 95.3 kg (210 lb)  Height: 6' 1" (185.4 cm)  Body mass index is 27.71 kg/m².     Physical Exam  Constitutional:       General: He is not in acute distress.     Appearance: He is well-developed. He is not diaphoretic.   HENT:      Head: Normocephalic and atraumatic.      Nose: Nose normal.   Eyes:      Conjunctiva/sclera: Conjunctivae normal.   Pulmonary:      Effort: Pulmonary effort is normal. No respiratory distress.   Abdominal:      General: Abdomen is flat. Bowel sounds are normal. There is no distension.      Palpations: Abdomen is soft.      Tenderness: There is no abdominal tenderness.   Musculoskeletal:      Cervical back: Normal range of motion.      Right lower leg: No edema.      Left lower leg: No edema.      Comments: Right third finger in cast   Skin:     General: Skin is warm and dry.      Findings: No erythema or rash.   Neurological:      Mental Status: He is alert.   Psychiatric:         Behavior: Behavior normal.          Laboratory:  CBC:   Recent Labs   Lab 08/29/23  1556 08/30/23  0428 08/31/23  0424   WBC 8.17 7.07 7.57   RBC 4.09* 3.34* 3.53*   HGB 9.9* 8.0* 8.6*   HCT 31.8* 25.6* 28.0*    171 192   MCV 78* 77* 79*   MCH 24.2* 24.0* 24.4*   MCHC 31.1* 31.3* 30.7*     CMP:   Recent Labs   Lab 08/29/23  1556 08/30/23  0428 08/31/23  0424   GLU 95 80 134*   CALCIUM 8.9 " 8.1* 8.0*   ALBUMIN 3.2* 2.6* 2.6*   PROT 7.5 5.9* 6.1    142 138   K 4.2 4.1 3.9   CO2 19* 20* 18*    112* 108   BUN 25* 28* 34*   CREATININE 3.9* 3.8* 4.2*   ALKPHOS 78 54* 60   ALT 18 12 9*   AST 23 19 18       Diagnostic Results: Kidney US reviewed.     Assessment/Plan:       *Acute renal failure superimposed on stage 4 chronic kidney disease  His baseline creatinine is between 2.4-3.8 since early 2023.  Cr 3.9 upon arrival, now 4.2  Avoid nephrotoxins, volume shifts, aim for BP within target to prevent additional renal injury  Renally dose all appropriate medications, including antibiotics  Strict I/O  Kidney US unremrakable  No evidence of retention   Recommend hold lasix and hydrate with 1L IVF over 24 hours      Immunodeficiency due to treatment with immunosuppressive medication  - Home regimen: mycophenolate mofetil, prednisone and tacrolimus   - Hold MMF due to current infection  - will check daily prograf levels, monitor for toxic side effects, and adjust for therapeutic dose  - Tacrolimus level: pending; f/up and adjust to maintain goal level ~ 4-6    Pyogenic arthritis of right hand  ID and Orthopedic teams on board  On Antibiotics  S/P procedure 8/30      H/O kidney transplant  DDRT #1 in July 1992 at [facility] which failed secondary to rejection. He had transplant nephrectomy of DDRT #1  DDRT #2 on 4/12/05         Sarah Coyle PA-C  Kidney Transplant  [facility] - Surgery

## 2023-08-31 NOTE — ASSESSMENT & PLAN NOTE
- Home regimen: mycophenolate mofetil, prednisone and tacrolimus   - Hold MMF due to current infection  - will check daily prograf levels, monitor for toxic side effects, and adjust for therapeutic dose  - Recommend:

## 2023-08-31 NOTE — ASSESSMENT & PLAN NOTE
Immunodeficiency due to treatment with immunosuppressive medication  - Continue home Tacrolimus and Prednisone to treat for chronic immunosuppression.   - Monitor daily Tacrolimus levels in hospital.   - Kidney Transplant Medicine team consulted to assist in immunosuppression.   Reports was not on MMF recently

## 2023-08-31 NOTE — PROGRESS NOTES
Pharmacist Renal Dose Adjustment Note    Ibrahima Phelps is a 67 y.o. male being treated with the medication Daptomycin.    Patient Data:    Vital Signs (Most Recent):  Temp: 96.6 °F (35.9 °C) (08/31/23 1142)  Pulse: 61 (08/31/23 1142)  Resp: 17 (08/31/23 1142)  BP: (!) 150/68 (08/31/23 1142)  SpO2: 99 % (08/31/23 1142) Vital Signs (72h Range):  Temp:  [96.4 °F (35.8 °C)-98.3 °F (36.8 °C)]   Pulse:  [50-76]   Resp:  [9-24]   BP: (115-175)/(58-81)   SpO2:  [93 %-100 %]      Recent Labs   Lab 08/29/23  1556 08/30/23  0428 08/31/23  0424   CREATININE 3.9* 3.8* 4.2*     Serum creatinine: 4.2 mg/dL (H) 08/31/23 0424  Estimated creatinine clearance: 19 mL/min (A)    Daptomycin 760 mg Q 24, will be changed to Daptomycin 760 mg Q 48.      Pharmacist's Name: Rolando Ely  Pharmacist's Extension: [phone]

## 2023-08-31 NOTE — SUBJECTIVE & OBJECTIVE
Principal Problem:Pyogenic arthritis of right hand    Principal Orthopedic Problem: Same    Interval History: Pt seen and examined at bedside. NAEON. Pain controlled.  VSS, AF. No other concerns stated.     Review of patient's allergies indicates:   Allergen Reactions    Ace inhibitors Swelling    Verapamil Other (See Comments)     Other reaction(s): Unknown    Povidone-iodine Itching       Current Facility-Administered Medications   Medication    acetaminophen tablet 1,000 mg    albuterol inhaler 2 puff    aluminum-magnesium hydroxide-simethicone 200-200-20 mg/5 mL suspension 30 mL    amiodarone tablet 200 mg    atorvastatin tablet 10 mg    cefTRIAXone (ROCEPHIN) 2 g in dextrose 5 % in water (D5W) 100 mL IVPB (MB+)    DAPTOmycin (CUBICIN) 760 mg in sodium chloride 0.9% SolP 50 mL IVPB    dextrose 10% bolus 125 mL 125 mL    dextrose 10% bolus 250 mL 250 mL    doxazosin tablet 4 mg    fluticasone furoate-vilanteroL 200-25 mcg/dose diskus inhaler 1 puff    furosemide tablet 20 mg    glucagon (human recombinant) injection 1 mg    glucose chewable tablet 16 g    glucose chewable tablet 24 g    hydrALAZINE tablet 50 mg    melatonin tablet 6 mg    methocarbamoL tablet 500 mg    metoprolol succinate (TOPROL-XL) 24 hr tablet 25 mg    naloxone 0.4 mg/mL injection 0.02 mg    NIFEdipine 24 hr tablet 60 mg    ondansetron injection 4 mg    oxyCODONE immediate release tablet 5 mg    pantoprazole EC tablet 40 mg    polyethylene glycol packet 17 g    predniSONE tablet 5 mg    sodium chloride 0.9% flush 10 mL    tacrolimus capsule 2 mg     Objective:     Vital Signs (Most Recent):  Temp: 98.1 °F (36.7 °C) (08/31/23 0803)  Pulse: 61 (08/31/23 0803)  Resp: 18 (08/31/23 0803)  BP: (!) 144/67 (08/31/23 0803)  SpO2: 99 % (08/31/23 0803) Vital Signs (24h Range):  Temp:  [97.7 °F (36.5 °C)-98.3 °F (36.8 °C)] 98.1 °F (36.7 °C)  Pulse:  [50-61] 61  Resp:  [9-24] 18  SpO2:  [97 %-100 %] 99 %  BP: (115-175)/(58-81 144/67     Weight: 95.3 kg  "(210 lb)  Height: 6' 1" (185.4 cm)  Body mass index is 27.71 kg/m².      Intake/Output Summary (Last 24 hours) at 8/31/2023 0854  Last data filed at 8/31/2023 0650  Gross per 24 hour   Intake 345 ml   Output 1500 ml   Net -1155 ml        Ortho/SPM Exam      Right Upper Extremity    -Dressing c/d/i  -ATTP  - Long finger stiff  -Compartments soft and compressible  -ROM full (shoulder/elbow/wrist)  -SILT M/R/U  -Motor intact Ain/PIN/U/M  -Brisk cap refill  -Warm well perfused extremities  -2+ Radial palpable       Significant Labs: BMP:   Recent Labs   Lab 08/31/23  0424   *      K 3.9      CO2 18*   BUN 34*   CREATININE 4.2*   CALCIUM 8.0*   MG 1.9     CBC:   Recent Labs   Lab 08/29/23  1556 08/30/23  0428 08/31/23  0424   WBC 8.17 7.07 7.57   HGB 9.9* 8.0* 8.6*   HCT 31.8* 25.6* 28.0*    171 192     CMP:   Recent Labs   Lab 08/29/23  1556 08/30/23  0428 08/31/23  0424    142 138   K 4.2 4.1 3.9    112* 108   CO2 19* 20* 18*   GLU 95 80 134*   BUN 25* 28* 34*   CREATININE 3.9* 3.8* 4.2*   CALCIUM 8.9 8.1* 8.0*   PROT 7.5 5.9* 6.1   ALBUMIN 3.2* 2.6* 2.6*   BILITOT 0.3 0.3 0.2   ALKPHOS 78 54* 60   AST 23 19 18   ALT 18 12 9*   ANIONGAP 11 10 12     All pertinent labs within the past 24 hours have been reviewed.    Significant Imaging: I have reviewed all pertinent imaging results/findings.  "

## 2023-08-31 NOTE — SUBJECTIVE & OBJECTIVE
Interval History: he reports hungry this morning and eating breakfast with wife at bedside. At home he usually takes his prograf at 8 am and 8 pm and has been receiving here at 10 am and 10 pm so will f/u with renal to see if this is an issue with prograf level testing at all and if needs closer paramaters on timing of administration of meds per their recs. They rec today to hold lasix further and give light IVF today for a total of 1 L over 24 hours so placed on 50 cc/NS an hour for 24 hours now, as Cr uptick a little more today to 4.2 with baseline aroudn 2.4 to 3.1 with 3.8 Cr yesterday. Following prograf levels further, they report have been off MMF for awhile now just the pred and prograf are meds he has been taking now. No signs of UTI on UA, he reports occasionally urine has a foul smell to it that goes away spontaeously on its own. On allof home BP meds now with improvement in BP and US with similar findings to before with elevated resistant index and 2 grams protein in urine. He was hoping to be able to go home sooner rather than later but I let them know that f finger is iproved he may still have to stay through weekend to get kidney function stabilized and id expect to be here a few more days either way this weekend to get final ID recs and to watch Cr a few more days. Hopefully he will be able to get a midline if the plan is for IV antibiotics given his advanecd kidney disease he would not be able to get a PICC as an option if he does need long term IV antibiotics, so gretta f/u ID recs further if he does need IV. Outpatient OT rec once med stable. On rocephin/dapto now for coverage.      Review of Systems   Constitutional:  Negative for fever.   Respiratory:  Negative for cough and shortness of breath.    Cardiovascular:  Negative for chest pain.   Gastrointestinal:  Negative for abdominal pain, diarrhea and nausea.   Musculoskeletal:  Positive for arthralgias (Right middle finger).   Neurological:   Negative for dizziness.   Psychiatric/Behavioral:  Negative for agitation and confusion.      Objective:     Vital Signs (Most Recent):  Temp: 98.1 °F (36.7 °C) (08/30/23 1200)  Pulse: 56 (08/30/23 1200)  Resp: 12 (08/30/23 1200)  BP: 155/72 (08/30/23 1200)  SpO2: 98 % (08/30/23 1200) on room air Vital Signs (24h Range):  Temp:  [96.6 °F (35.9 °C)-98.3 °F (36.8 °C)] 96.6 °F (35.9 °C)  Pulse:  [56-63] 61  Resp:  [12-18] 17  SpO2:  [97 %-99 %] 99 %  BP: (144-174)/(67-79) 150/68     Weight: 95.3 kg (210 lb)  Body mass index is 27.71 kg/m².    Intake/Output Summary (Last 24 hours) at 8/31/2023 1156  Last data filed at 8/31/2023 0650  Gross per 24 hour   Intake 230 ml   Output 1500 ml   Net -1270 ml           Physical Exam  Vitals and nursing note reviewed.   Constitutional:       General: He is awake. He is not in acute distress.     Appearance: Normal appearance. He is normal weight. He is not ill-appearing.   Cardiovascular:      Rate and Rhythm: Normal rate and regular rhythm.      Heart sounds: Normal heart sounds. No murmur heard.     No friction rub. No gallop.   Pulmonary:      Effort: Pulmonary effort is normal. No respiratory distress.      Breath sounds: Normal breath sounds. No wheezing.   Abdominal:      General: Abdomen is flat. Bowel sounds are normal. There is no distension.      Palpations: Abdomen is soft.      Tenderness: There is no abdominal tenderness.   Musculoskeletal:      Right lower leg: No edema.      Left lower leg: No edema.   Skin:     Findings: No erythema.      Comments: Right hand and right middle finger wrapped in surgical bandage   Neurological:      Mental Status: He is alert and oriented to person, place, and time.   Psychiatric:         Mood and Affect: Mood normal.         Behavior: Behavior normal. Behavior is cooperative.         Thought Content: Thought content normal.         Judgment: Judgment normal.             Significant Labs: CBC:   Recent Labs   Lab 08/29/23  0246  08/30/23  0428 08/31/23  0424   WBC 8.17 7.07 7.57   HGB 9.9* 8.0* 8.6*   HCT 31.8* 25.6* 28.0*    171 192       CMP:   Recent Labs   Lab 08/29/23  1556 08/30/23  0428 08/31/23  0424    142 138   K 4.2 4.1 3.9    112* 108   CO2 19* 20* 18*   GLU 95 80 134*   BUN 25* 28* 34*   CREATININE 3.9* 3.8* 4.2*   CALCIUM 8.9 8.1* 8.0*   PROT 7.5 5.9* 6.1   ALBUMIN 3.2* 2.6* 2.6*   BILITOT 0.3 0.3 0.2   ALKPHOS 78 54* 60   AST 23 19 18   ALT 18 12 9*   ANIONGAP 11 10 12       Sed Rate   Date Value Ref Range Status   08/29/2023 >120 (H) 0 - 23 mm/Hr Final     CRP   Date Value Ref Range Status   08/29/2023 17.7 (H) 0.0 - 8.2 mg/L Final     Recent Labs     08/31/23  0424   TACROLIMUS 3.9*          Significant Imaging: I have reviewed all pertinent imaging results/findings within the past 24 hours.

## 2023-08-31 NOTE — PLAN OF CARE
Problem: Adult Inpatient Plan of Care  Goal: Plan of Care Review  Outcome: Ongoing, Progressing  Goal: Patient-Specific Goal (Individualized)  Outcome: Ongoing, Progressing  Goal: Absence of Hospital-Acquired Illness or Injury  Outcome: Ongoing, Progressing  Goal: Optimal Comfort and Wellbeing  Outcome: Ongoing, Progressing  Goal: Readiness for Transition of Care  Outcome: Ongoing, Progressing     Problem: Fall Injury Risk  Goal: Absence of Fall and Fall-Related Injury  Outcome: Ongoing, Progressing     Problem: Skin Injury Risk Increased  Goal: Skin Health and Integrity  Outcome: Ongoing, Progressing     Problem: Fluid and Electrolyte Imbalance (Acute Kidney Injury/Impairment)  Goal: Fluid and Electrolyte Balance  Outcome: Ongoing, Progressing     Problem: Oral Intake Inadequate (Acute Kidney Injury/Impairment)  Goal: Optimal Nutrition Intake  Outcome: Ongoing, Progressing     Problem: Renal Function Impairment (Acute Kidney Injury/Impairment)  Goal: Effective Renal Function  Outcome: Ongoing, Progressing

## 2023-08-31 NOTE — ASSESSMENT & PLAN NOTE
His baseline creatinine is between 2.4-3.8 since early 2023.  Cr 3.9 upon arrival, now 4.2  Avoid nephrotoxins, volume shifts, aim for BP within target to prevent additional renal injury  Renally dose all appropriate medications, including antibiotics  Strict I/O  Kidney US unremrakable  No evidence of retention   Recommend hold lasix and hydrate with 1L IVF over 24 hours

## 2023-09-01 LAB
ALBUMIN SERPL BCP-MCNC: 2.7 G/DL (ref 3.5–5.2)
ALP SERPL-CCNC: 65 U/L (ref 55–135)
ALT SERPL W/O P-5'-P-CCNC: <5 U/L (ref 10–44)
ANION GAP SERPL CALC-SCNC: 10 MMOL/L (ref 8–16)
AST SERPL-CCNC: 18 U/L (ref 10–40)
BASOPHILS # BLD AUTO: 0.02 K/UL (ref 0–0.2)
BASOPHILS NFR BLD: 0.3 % (ref 0–1.9)
BILIRUB SERPL-MCNC: 0.1 MG/DL (ref 0.1–1)
BUN SERPL-MCNC: 32 MG/DL (ref 8–23)
CALCIUM SERPL-MCNC: 7.5 MG/DL (ref 8.7–10.5)
CHLORIDE SERPL-SCNC: 111 MMOL/L (ref 95–110)
CO2 SERPL-SCNC: 20 MMOL/L (ref 23–29)
CREAT SERPL-MCNC: 3.9 MG/DL (ref 0.5–1.4)
DIFFERENTIAL METHOD: ABNORMAL
EOSINOPHIL # BLD AUTO: 0.6 K/UL (ref 0–0.5)
EOSINOPHIL NFR BLD: 7.8 % (ref 0–8)
ERYTHROCYTE [DISTWIDTH] IN BLOOD BY AUTOMATED COUNT: 17 % (ref 11.5–14.5)
EST. GFR  (NO RACE VARIABLE): 16 ML/MIN/1.73 M^2
GLUCOSE SERPL-MCNC: 114 MG/DL (ref 70–110)
HCT VFR BLD AUTO: 27.5 % (ref 40–54)
HGB BLD-MCNC: 8.5 G/DL (ref 14–18)
IMM GRANULOCYTES # BLD AUTO: 0.03 K/UL (ref 0–0.04)
IMM GRANULOCYTES NFR BLD AUTO: 0.4 % (ref 0–0.5)
LYMPHOCYTES # BLD AUTO: 1.7 K/UL (ref 1–4.8)
LYMPHOCYTES NFR BLD: 23.7 % (ref 18–48)
MAGNESIUM SERPL-MCNC: 2 MG/DL (ref 1.6–2.6)
MCH RBC QN AUTO: 24.4 PG (ref 27–31)
MCHC RBC AUTO-ENTMCNC: 30.9 G/DL (ref 32–36)
MCV RBC AUTO: 79 FL (ref 82–98)
MONOCYTES # BLD AUTO: 0.6 K/UL (ref 0.3–1)
MONOCYTES NFR BLD: 7.8 % (ref 4–15)
NEUTROPHILS # BLD AUTO: 4.3 K/UL (ref 1.8–7.7)
NEUTROPHILS NFR BLD: 60 % (ref 38–73)
NRBC BLD-RTO: 0 /100 WBC
PHOSPHATE SERPL-MCNC: 5.2 MG/DL (ref 2.7–4.5)
PLATELET # BLD AUTO: 180 K/UL (ref 150–450)
PMV BLD AUTO: 11.6 FL (ref 9.2–12.9)
POTASSIUM SERPL-SCNC: 3.7 MMOL/L (ref 3.5–5.1)
PROT SERPL-MCNC: 6.2 G/DL (ref 6–8.4)
RBC # BLD AUTO: 3.48 M/UL (ref 4.6–6.2)
SODIUM SERPL-SCNC: 141 MMOL/L (ref 136–145)
TACROLIMUS BLD-MCNC: 4 NG/ML (ref 5–15)
WBC # BLD AUTO: 7.17 K/UL (ref 3.9–12.7)

## 2023-09-01 PROCEDURE — 63600175 PHARM REV CODE 636 W HCPCS: Performed by: FAMILY MEDICINE

## 2023-09-01 PROCEDURE — 99233 SBSQ HOSP IP/OBS HIGH 50: CPT | Mod: ,,, | Performed by: HOSPITALIST

## 2023-09-01 PROCEDURE — 63600175 PHARM REV CODE 636 W HCPCS: Performed by: INTERNAL MEDICINE

## 2023-09-01 PROCEDURE — 11000001 HC ACUTE MED/SURG PRIVATE ROOM

## 2023-09-01 PROCEDURE — 84100 ASSAY OF PHOSPHORUS: CPT | Performed by: HOSPITALIST

## 2023-09-01 PROCEDURE — 25000003 PHARM REV CODE 250: Performed by: FAMILY MEDICINE

## 2023-09-01 PROCEDURE — 99233 PR SUBSEQUENT HOSPITAL CARE,LEVL III: ICD-10-PCS | Mod: ,,, | Performed by: HOSPITALIST

## 2023-09-01 PROCEDURE — 99233 SBSQ HOSP IP/OBS HIGH 50: CPT | Mod: ,,,

## 2023-09-01 PROCEDURE — 25000003 PHARM REV CODE 250: Performed by: INTERNAL MEDICINE

## 2023-09-01 PROCEDURE — 99233 PR SUBSEQUENT HOSPITAL CARE,LEVL III: ICD-10-PCS | Mod: ,,,

## 2023-09-01 PROCEDURE — 83735 ASSAY OF MAGNESIUM: CPT | Performed by: HOSPITALIST

## 2023-09-01 PROCEDURE — 80197 ASSAY OF TACROLIMUS: CPT | Performed by: FAMILY MEDICINE

## 2023-09-01 PROCEDURE — 25000003 PHARM REV CODE 250: Performed by: HOSPITALIST

## 2023-09-01 PROCEDURE — 80053 COMPREHEN METABOLIC PANEL: CPT | Performed by: HOSPITALIST

## 2023-09-01 PROCEDURE — 36415 COLL VENOUS BLD VENIPUNCTURE: CPT | Performed by: HOSPITALIST

## 2023-09-01 PROCEDURE — 85025 COMPLETE CBC W/AUTO DIFF WBC: CPT | Performed by: HOSPITALIST

## 2023-09-01 RX ORDER — FUROSEMIDE 40 MG/1
20 TABLET ORAL 2 TIMES DAILY
Status: ON HOLD
Start: 2023-09-01 | End: 2023-10-27 | Stop reason: SDUPTHER

## 2023-09-01 RX ORDER — AMIODARONE HYDROCHLORIDE 200 MG/1
200 TABLET ORAL DAILY
Qty: 30 TABLET | Refills: 11
Start: 2023-09-02 | End: 2023-09-20 | Stop reason: SDUPTHER

## 2023-09-01 RX ORDER — DOXYCYCLINE 100 MG/1
100 CAPSULE ORAL EVERY 12 HOURS
Qty: 60 CAPSULE | Refills: 0 | Status: SHIPPED | OUTPATIENT
Start: 2023-09-01 | End: 2023-10-02

## 2023-09-01 RX ORDER — LEVOFLOXACIN 750 MG/1
750 TABLET ORAL EVERY OTHER DAY
Qty: 15 TABLET | Refills: 0 | Status: SHIPPED | OUTPATIENT
Start: 2023-09-01 | End: 2023-10-02

## 2023-09-01 RX ORDER — OXYCODONE HYDROCHLORIDE 5 MG/1
5 TABLET ORAL EVERY 4 HOURS PRN
Qty: 30 TABLET | Refills: 0 | Status: ON HOLD | OUTPATIENT
Start: 2023-09-01 | End: 2023-10-20 | Stop reason: HOSPADM

## 2023-09-01 RX ORDER — GABAPENTIN 100 MG/1
100 CAPSULE ORAL
Status: ON HOLD
Start: 2023-09-01 | End: 2023-10-20 | Stop reason: SDUPTHER

## 2023-09-01 RX ADMIN — ACETAMINOPHEN 1000 MG: 500 TABLET ORAL at 05:09

## 2023-09-01 RX ADMIN — APIXABAN 5 MG: 5 TABLET, FILM COATED ORAL at 09:09

## 2023-09-01 RX ADMIN — APIXABAN 5 MG: 5 TABLET, FILM COATED ORAL at 08:09

## 2023-09-01 RX ADMIN — HYDRALAZINE HYDROCHLORIDE 50 MG: 50 TABLET ORAL at 08:09

## 2023-09-01 RX ADMIN — OXYCODONE HYDROCHLORIDE 5 MG: 5 TABLET ORAL at 05:09

## 2023-09-01 RX ADMIN — METOPROLOL SUCCINATE 25 MG: 25 TABLET, EXTENDED RELEASE ORAL at 09:09

## 2023-09-01 RX ADMIN — METHOCARBAMOL 500 MG: 500 TABLET ORAL at 08:09

## 2023-09-01 RX ADMIN — METHOCARBAMOL 500 MG: 500 TABLET ORAL at 09:09

## 2023-09-01 RX ADMIN — PREDNISONE 5 MG: 5 TABLET ORAL at 09:09

## 2023-09-01 RX ADMIN — HYDRALAZINE HYDROCHLORIDE 50 MG: 50 TABLET ORAL at 03:09

## 2023-09-01 RX ADMIN — TACROLIMUS 2 MG: 1 CAPSULE ORAL at 08:09

## 2023-09-01 RX ADMIN — DOXAZOSIN 4 MG: 2 TABLET ORAL at 08:09

## 2023-09-01 RX ADMIN — METHOCARBAMOL 500 MG: 500 TABLET ORAL at 04:09

## 2023-09-01 RX ADMIN — NIFEDIPINE 60 MG: 30 TABLET, FILM COATED, EXTENDED RELEASE ORAL at 09:09

## 2023-09-01 RX ADMIN — PANTOPRAZOLE SODIUM 40 MG: 40 TABLET, DELAYED RELEASE ORAL at 09:09

## 2023-09-01 RX ADMIN — DOXAZOSIN 4 MG: 2 TABLET ORAL at 09:09

## 2023-09-01 RX ADMIN — ACETAMINOPHEN 1000 MG: 500 TABLET ORAL at 09:09

## 2023-09-01 RX ADMIN — ATORVASTATIN CALCIUM 10 MG: 10 TABLET, FILM COATED ORAL at 09:09

## 2023-09-01 RX ADMIN — METHOCARBAMOL 500 MG: 500 TABLET ORAL at 01:09

## 2023-09-01 RX ADMIN — HYDRALAZINE HYDROCHLORIDE 50 MG: 50 TABLET ORAL at 09:09

## 2023-09-01 RX ADMIN — NIFEDIPINE 60 MG: 30 TABLET, FILM COATED, EXTENDED RELEASE ORAL at 08:09

## 2023-09-01 RX ADMIN — AMIODARONE HYDROCHLORIDE 200 MG: 200 TABLET ORAL at 09:09

## 2023-09-01 RX ADMIN — ACETAMINOPHEN 1000 MG: 500 TABLET ORAL at 01:09

## 2023-09-01 RX ADMIN — DAPTOMYCIN 760 MG: 500 INJECTION, POWDER, LYOPHILIZED, FOR SOLUTION INTRAVENOUS at 03:09

## 2023-09-01 RX ADMIN — CEFTRIAXONE 2 G: 2 INJECTION, POWDER, FOR SOLUTION INTRAMUSCULAR; INTRAVENOUS at 04:09

## 2023-09-01 RX ADMIN — POLYETHYLENE GLYCOL 3350 17 G: 17 POWDER, FOR SOLUTION ORAL at 09:09

## 2023-09-01 RX ADMIN — TACROLIMUS 2 MG: 1 CAPSULE ORAL at 09:09

## 2023-09-01 RX ADMIN — OXYCODONE HYDROCHLORIDE 5 MG: 5 TABLET ORAL at 06:09

## 2023-09-01 NOTE — ASSESSMENT & PLAN NOTE
· Patient's baseline creatinine 2.7-3.2 consistent with CKD stage 4 but creatinine 3.8 on admit--> 4.2 now Patient has had creatinines this high in past 1 year. Patient with previous kidney transplant and last in 2005. IVF given on 8/31 per recs and lasix held and improving back around 3.8 now, continue oral hydration per renal recs today.   · US showing elevated resistance indices, Pr/Cr 2 grams.   · Consult Kidney Transplant medicine- rec hold lasix today and light IVF for 1L/24 hours. .   · Strict I+Os to closely monitor urine output.   · Avoid any nephrotoxic agents and meds.   · Renally adjust all medications based on GFR.   · Monitor daily BMP to follow renal function.

## 2023-09-01 NOTE — PLAN OF CARE
Pt is AAOX4,VSS. Pt is progressing towards plan of care goals. Pain management has been assessed. PRN medications given and pain reassessed.IV abx given. SCDs in place with frequent checks for skin breakdown. Ambulatory with standby assist. Fall precautions in  place, no report of falls. Safety measures are in place bed in lowest position, wheels locked, call light within reach.

## 2023-09-01 NOTE — PLAN OF CARE
09/01/23 1035   Post-Acute Status   Post-Acute Authorization Home Health;IV Infusion   Post-Acute Placement Status Referrals Sent   Home Health Status Referrals Sent     SW sent referral to [facility] for possible IV- ABX need. Pt is expected to be d/c on 09/02. SW to follow.    Ambar Snider LMSW  Case Management   [facility]   Ext. [phone]

## 2023-09-01 NOTE — SUBJECTIVE & OBJECTIVE
Subjective:   History of Present Illness:  Mr. Phelps is a 67 y.o. year old Black male with [place] [facility] [place] secondary to [place] and he resumed dialysis in April 1996 until receiving DDRT #2 on 4/12/05. He had transplant nephrectomy of DDRT #1. His ibaseline creatinine is between 2.4-3.8 since early 2023. He takes mycophenolate mofetil, prednisone and tacrolimus for maintenance immunosuppression.       presents to the emergency department with chief complaint of right 3rd digit pain and swelling that began 5 days ago.   In the ED patient afebrile and hemodynamically stable saturating well on room air. WBC wnl. ESR and CRP elevated. Orthopedic surgery consulted and evaluated patient with high suspicion for tenosynovitis. MRI ordered, patient started on Unasyn, admitted to the care of medicine for further evaluation and management.   His Cr 3.9 . KTM consulted for CKD and IS management .          Hospital Course:  Hospital Course Ortho consulted for right middle finger tenosynovitis and patient taken to OR 8/30 by Dr. Martin Larsen and underwent arthrotomy of right middle finger proximal interphalangeal joint with lavage for septic arthritis. There was some murky fluid that was expressed from right middle finger. Fluid sent for cultures. Cr elevated on admit. Kidney US unremarkable. UA unremarkable.      Interval History:   NAEON. Cr coming back down, 3.9 today. Patient urinating well.Cont to hold cellcept. Cont to monitor tacrolimus level and adjust as needed. Cont prednisone. Recommend cont hydration today. Can restart lasix outpatient as needed once kidney function returns to baseline. Midline ok for IV abx administration.       Past Medical, Surgical, Family, and Social History:   Unchanged from H&P.    Scheduled Meds:   acetaminophen  1,000 mg Oral Q8H    amiodarone  200 mg Oral Daily    apixaban  5 mg Oral BID    atorvastatin  10 mg Oral Daily    cefTRIAXone (ROCEPHIN)  IVPB  2 g Intravenous Q24H    DAPTOmycin (CUBICIN) IV (PEDS and ADULTS)  8 mg/kg Intravenous Q48H    doxazosin  4 mg Oral Q12H    fluticasone furoate-vilanteroL  1 puff Inhalation Daily    hydrALAZINE  50 mg Oral TID    methocarbamoL  500 mg Oral QID    metoprolol succinate  25 mg Oral Daily    NIFEdipine  60 mg Oral BID    pantoprazole  40 mg Oral Daily    polyethylene glycol  17 g Oral Daily    predniSONE  5 mg Oral Daily    tacrolimus  2 mg Oral BID     Continuous Infusions:   sodium chloride 0.9% 50 mL/hr at 08/31/23 1847     PRN Meds:albuterol **AND** MDI Q4H PRN, aluminum-magnesium hydroxide-simethicone, dextrose 10%, dextrose 10%, glucagon (human recombinant), glucose, glucose, melatonin, naloxone, ondansetron, oxyCODONE, sodium chloride 0.9%    Intake/Output - Last 3 Shifts         08/30 0700 08/31 0659 08/31 0700 09/01 0659 09/01 0700 09/02 0659    P.O. 345 665     IV Piggyback 250      Total Intake(mL/kg) 595 (6.2) 665 (7)     Urine (mL/kg/hr) 1500 (0.7) 1200 (0.5)     Blood 0      Total Output 1500 1200     Net -902 -469                     Review of Systems   Constitutional:  Negative for fever.   Respiratory:  Negative for cough and shortness of breath.    Cardiovascular:  Negative for chest pain and leg swelling.   Gastrointestinal:  Negative for abdominal pain, nausea and vomiting.   Endocrine: Negative for polyuria.   Genitourinary:  Negative for decreased urine volume, difficulty urinating, dysuria and frequency.   Skin:  Positive for wound.   Allergic/Immunologic: Positive for immunocompromised state.   Neurological:  Negative for weakness.   Psychiatric/Behavioral:  Negative for confusion and sleep disturbance.       Objective:     Vital Signs (Most Recent):  Temp: 97.5 °F (36.4 °C) (09/01/23 0747)  Pulse: 64 (09/01/23 0915)  Resp: 16 (09/01/23 0747)  BP: (!) 150/67 (09/01/23 0915)  SpO2: 96 % (09/01/23 0747) Vital Signs (24h Range):  Temp:  [96.6 °F (35.9 °C)-97.8 °F (36.6 °C)] 97.5 °F (36.4  "°C)  Pulse:  [58-64] 64  Resp:  [16-18] 16  SpO2:  [96 %-99 %] 96 %  BP: (139-150)/(67-74) 150/67     Weight: 95.3 kg (210 lb)  Height: 6' 1" (185.4 cm)  Body mass index is 27.71 kg/m².     Physical Exam  Vitals and nursing note reviewed.   Constitutional:       General: He is not in acute distress.     Appearance: He is well-developed. He is not diaphoretic.   HENT:      Head: Normocephalic and atraumatic.      Nose: Nose normal.   Eyes:      Extraocular Movements: Extraocular movements intact.      Conjunctiva/sclera: Conjunctivae normal.   Cardiovascular:      Rate and Rhythm: Normal rate and regular rhythm.   Pulmonary:      Effort: Pulmonary effort is normal. No respiratory distress.   Abdominal:      General: Abdomen is flat. Bowel sounds are normal. There is no distension.      Palpations: Abdomen is soft.      Tenderness: There is no abdominal tenderness.   Musculoskeletal:      Cervical back: Normal range of motion.      Right lower leg: No edema.      Left lower leg: No edema.      Comments: Right third finger in cast   Skin:     General: Skin is warm and dry.      Findings: No erythema or rash.   Neurological:      Mental Status: He is alert.   Psychiatric:         Behavior: Behavior normal.          Laboratory:  CBC:   Recent Labs   Lab 08/30/23 0428 08/31/23 0424 09/01/23  0531   WBC 7.07 7.57 7.17   RBC 3.34* 3.53* 3.48*   HGB 8.0* 8.6* 8.5*   HCT 25.6* 28.0* 27.5*    192 180   MCV 77* 79* 79*   MCH 24.0* 24.4* 24.4*   MCHC 31.3* 30.7* 30.9*     CMP:   Recent Labs   Lab 08/30/23 0428 08/31/23 0424 09/01/23  0531   GLU 80 134* 114*   CALCIUM 8.1* 8.0* 7.5*   ALBUMIN 2.6* 2.6* 2.7*   PROT 5.9* 6.1 6.2    138 141   K 4.1 3.9 3.7   CO2 20* 18* 20*   * 108 111*   BUN 28* 34* 32*   CREATININE 3.8* 4.2* 3.9*   ALKPHOS 54* 60 65   ALT 12 9* <5*   AST 19 18 18     Labs within the past 24 hours have been reviewed.    Diagnostic Results:  None  "

## 2023-09-01 NOTE — PROGRESS NOTES
[facility] - [facility]  Kidney Transplant  Progress Note      Reason for Follow-up: Reassessment of Kidney Transplant - 4/12/2005 - Not Followed  (#2) recipient and management of immunosuppression.    ORGAN: LEFT KIDNEY    Donor Type:     Donor CMV Status: Positive  Donor HBcAB:Negative  Donor HCV Status:Negative  Donor HBV BENI:   Donor HCV BENI:       Subjective:   History of Present Illness:  Mr. Phelps is a 67 y.o. year old Black male with history of DDRT in July 1992 at [facility] [place] which failed secondary to rejection and he resumed dialysis in April 1996 until receiving DDRT #2 on 4/12/05. He had transplant nephrectomy of DDRT #1. His ibaseline creatinine is between 2.4-3.8 since early 2023. He takes mycophenolate mofetil, prednisone and tacrolimus for maintenance immunosuppression.       presents to the emergency department with chief complaint of right 3rd digit pain and swelling that began 5 days ago.   In the ED patient afebrile and hemodynamically stable saturating well on room air. WBC wnl. ESR and CRP elevated. Orthopedic surgery consulted and evaluated patient with high suspicion for tenosynovitis. MRI ordered, patient started on Unasyn, admitted to the care of medicine for further evaluation and management.   His Cr 3.9 . KTM consulted for CKD and IS management .          Hospital Course:  Hospital Course Ortho consulted for right middle finger tenosynovitis and patient taken to OR 8/30 by Dr. Martin Larsen and underwent arthrotomy of right middle finger proximal interphalangeal joint with lavage for septic arthritis. There was some murky fluid that was expressed from right middle finger. Fluid sent for cultures. Cr elevated on admit. Kidney US unremarkable. UA unremarkable.      Interval History:   NAEON. Cr coming back down, 3.9 today. Patient urinating well.Cont to hold cellcept. Cont to monitor tacrolimus level and adjust as needed. Cont prednisone. Recommend cont hydration today. Can restart lasix  outpatient as needed once kidney function returns to baseline. Midline ok for IV abx administration.       Past Medical, Surgical, Family, and Social History:   Unchanged from H&P.    Scheduled Meds:   acetaminophen  1,000 mg Oral Q8H    amiodarone  200 mg Oral Daily    apixaban  5 mg Oral BID    atorvastatin  10 mg Oral Daily    cefTRIAXone (ROCEPHIN) IVPB  2 g Intravenous Q24H    DAPTOmycin (CUBICIN) IV (PEDS and ADULTS)  8 mg/kg Intravenous Q48H    doxazosin  4 mg Oral Q12H    fluticasone furoate-vilanteroL  1 puff Inhalation Daily    hydrALAZINE  50 mg Oral TID    methocarbamoL  500 mg Oral QID    metoprolol succinate  25 mg Oral Daily    NIFEdipine  60 mg Oral BID    pantoprazole  40 mg Oral Daily    polyethylene glycol  17 g Oral Daily    predniSONE  5 mg Oral Daily    tacrolimus  2 mg Oral BID     Continuous Infusions:   sodium chloride 0.9% 50 mL/hr at 08/31/23 1847     PRN Meds:albuterol **AND** MDI Q4H PRN, aluminum-magnesium hydroxide-simethicone, dextrose 10%, dextrose 10%, glucagon (human recombinant), glucose, glucose, melatonin, naloxone, ondansetron, oxyCODONE, sodium chloride 0.9%    Intake/Output - Last 3 Shifts         08/30 0700  08/31 0659 08/31 0700  09/01 0659 09/01 0700  09/02 0659    P.O. 345 665     IV Piggyback 250      Total Intake(mL/kg) 595 (6.2) 665 (7)     Urine (mL/kg/hr) 1500 (0.7) 1200 (0.5)     Blood 0      Total Output 1500 1200     Net -905 -629                     Review of Systems   Constitutional:  Negative for fever.   Respiratory:  Negative for cough and shortness of breath.    Cardiovascular:  Negative for chest pain and leg swelling.   Gastrointestinal:  Negative for abdominal pain, nausea and vomiting.   Endocrine: Negative for polyuria.   Genitourinary:  Negative for decreased urine volume, difficulty urinating, dysuria and frequency.   Skin:  Positive for wound.   Allergic/Immunologic: Positive for immunocompromised state.   Neurological:  Negative for  "weakness.   Psychiatric/Behavioral:  Negative for confusion and sleep disturbance.       Objective:     Vital Signs (Most Recent):  Temp: 97.5 °F (36.4 °C) (09/01/23 0747)  Pulse: 64 (09/01/23 0915)  Resp: 16 (09/01/23 0747)  BP: (!) 150/67 (09/01/23 0915)  SpO2: 96 % (09/01/23 0747) Vital Signs (24h Range):  Temp:  [96.6 °F (35.9 °C)-97.8 °F (36.6 °C)] 97.5 °F (36.4 °C)  Pulse:  [58-64] 64  Resp:  [16-18] 16  SpO2:  [96 %-99 %] 96 %  BP: (139-150)/(67-74) 150/67     Weight: 95.3 kg (210 lb)  Height: 6' 1" (185.4 cm)  Body mass index is 27.71 kg/m².     Physical Exam  Vitals and nursing note reviewed.   Constitutional:       General: He is not in acute distress.     Appearance: He is well-developed. He is not diaphoretic.   HENT:      Head: Normocephalic and atraumatic.      Nose: Nose normal.   Eyes:      Extraocular Movements: Extraocular movements intact.      Conjunctiva/sclera: Conjunctivae normal.   Cardiovascular:      Rate and Rhythm: Normal rate and regular rhythm.   Pulmonary:      Effort: Pulmonary effort is normal. No respiratory distress.   Abdominal:      General: Abdomen is flat. Bowel sounds are normal. There is no distension.      Palpations: Abdomen is soft.      Tenderness: There is no abdominal tenderness.   Musculoskeletal:      Cervical back: Normal range of motion.      Right lower leg: No edema.      Left lower leg: No edema.      Comments: Right third finger in cast   Skin:     General: Skin is warm and dry.      Findings: No erythema or rash.   Neurological:      Mental Status: He is alert.   Psychiatric:         Behavior: Behavior normal.          Laboratory:  CBC:   Recent Labs   Lab 08/30/23  0428 08/31/23  0424 09/01/23  0531   WBC 7.07 7.57 7.17   RBC 3.34* 3.53* 3.48*   HGB 8.0* 8.6* 8.5*   HCT 25.6* 28.0* 27.5*    192 180   MCV 77* 79* 79*   MCH 24.0* 24.4* 24.4*   MCHC 31.3* 30.7* 30.9*     CMP:   Recent Labs   Lab 08/30/23  0428 08/31/23  0424 09/01/23  0531   GLU 80 134* " 114*   CALCIUM 8.1* 8.0* 7.5*   ALBUMIN 2.6* 2.6* 2.7*   PROT 5.9* 6.1 6.2    138 141   K 4.1 3.9 3.7   CO2 20* 18* 20*   * 108 111*   BUN 28* 34* 32*   CREATININE 3.8* 4.2* 3.9*   ALKPHOS 54* 60 65   ALT 12 9* <5*   AST 19 18 18     Labs within the past 24 hours have been reviewed.    Diagnostic Results:  None    Assessment/Plan:     * Pyogenic arthritis of right hand  ID and Orthopedic teams on board  On Antibiotics  S/P procedure 8/30  Ok for midline for abx       Acute renal failure superimposed on stage 4 chronic kidney disease  His baseline creatinine is between 2.4-3.8 since early 2023.  Cr 3.9 upon arrival, now 4.2  Avoid nephrotoxins, volume shifts, aim for BP within target to prevent additional renal injury  Renally dose all appropriate medications, including antibiotics  Strict I/O  Kidney US unremrakable  No evidence of retention   Recommend hold lasix and continue hydration ON      Immunodeficiency due to treatment with immunosuppressive medication  - Home regimen: mycophenolate mofetil, prednisone and tacrolimus   - Hold MMF due to current infection  - will check daily prograf levels, monitor for toxic side effects, and adjust for therapeutic dose    H/O kidney transplant  DDRT #1 in July 1992 at [facility] [place] which failed secondary to rejection. He had transplant nephrectomy of DDRT #1  DDRT #2 on 4/12/05         Discharge Planning:  Ok to dc tomorrow per primary team if Cr cont to improve     Sun Solorzano PA-C  Kidney Transplant  [facility] - [facility]

## 2023-09-01 NOTE — PROGRESS NOTES
[facility]   Infectious Diseases  Progress Note     Patient Name: Ibrahima Phelps   MRN:  [id number]   Admission Date:  8/29/2023  Length of Stay:  3  Attending Physician:  Sarah Braga MD   Primary Care Provider:  Anatoliy Colindres MD       Isolation Status: No active isolations    Assessment/Plan:      ID  Pyogenic arthritis of right hand  67-year-old male with history of CAD, afib and kidney transplant x2 (7/1992 and 4/12/05, on MMF, PDN, tac) presented on 8/29/2023 with right third digit pain x dats, found to have PIP septic arthritis, now s/p washout 8/30/2023. Currently on dapto and ceftriaxone. Cultures remain NGTD.      Recommendations:  - OK to switch to po regimen at this time: can stop daptomycin and ceftriaxone, start doxycycline 100 BID and levofloxacin 750 q48h x4 weeks (end date: 9/27/2023).   - Will arrange follow up in ID clinic with Dr. Seymour to follow up cultures.     Orthopedic  * Flexor tenosynovitis of right middle finger  See pyogenic arthritis of right hand     Anticipated Disposition: per primary     Thank you for your consult. We will sign off now. Please contact us if you have any additional questions.     Deb Castro MD  Infectious Diseases     Subjective:      Principal Problem: Pyogenic arthritis of right hand    HPI: 67-year-old male with history of CAD, afib, kidney transplant, presented with right third digit pain. Pain started about 5 days prior to admission, progressively worsened to the point where he was unable to move his finger. He denies any trauma to the area. No exposure to dirt, water. Possibly related to insect bite. No animals at home. No outdoor activities. Does housework and intermittent projects in the garage. Patient was started empirically on amp-sulbactam IV. Ortho performed washout this AM.     Interval History: Patient doing well today, family at bedside. Says his pain is well controlled, had dressing changed  "overnight. Inquiring when he can go home.      Review of Systems   ROS    Objective:      Last 24 Hour Vital Signs:  BP  Min: 139/74  Max: 152/71  Temp  Av.6 °F (36.4 °C)  Min: 97.5 °F (36.4 °C)  Max: 97.8 °F (36.6 °C)  Pulse  Av  Min: 58  Max: 64  Resp  Av.3  Min: 16  Max: 18  SpO2  Av.5 %  Min: 96 %  Max: 98 %  Height  Av' 1" (185.4 cm)  Min: 6' 1" (185.4 cm)  Max: 6' 1" (185.4 cm)  Weight  Av.3 kg (210 lb)  Min: 95.3 kg (210 lb)  Max: 95.3 kg (210 lb)  I/O last 3 completed shifts:  In: 665 [P.O.:665]  Out: 1800 [Urine:1800]    Body mass index is 27.71 kg/m².  Estimated Creatinine Clearance: 20.5 mL/min (A) (based on SCr of 3.9 mg/dL (H)).        Physical Exam  Physical Exam  Vitals reviewed.   Constitutional:       General: He is not in acute distress.     Appearance: Normal appearance. He is not ill-appearing or diaphoretic.   HENT:      Head: Normocephalic and atraumatic.      Nose: Nose normal.      Mouth/Throat:      Mouth: Mucous membranes are moist.      Pharynx: Oropharynx is clear. No oropharyngeal exudate or posterior oropharyngeal erythema.   Eyes:      General: No scleral icterus.     Extraocular Movements: Extraocular movements intact.      Pupils: Pupils are equal, round, and reactive to light.   Cardiovascular:      Rate and Rhythm: Normal rate and regular rhythm.      Pulses: Normal pulses.      Heart sounds: No murmur heard.  Pulmonary:      Effort: Pulmonary effort is normal. No respiratory distress.      Breath sounds: Normal breath sounds. No wheezing or rales.   Abdominal:      General: Abdomen is flat. Bowel sounds are normal.      Palpations: Abdomen is soft.   Musculoskeletal:         General: Deformity and signs of injury present. No swelling or tenderness. Normal range of motion.      Cervical back: Normal range of motion and neck supple.      Comments: Right hand and third digit with surgical dressing c/d/i   Skin:     General: Skin is warm.      Capillary " Refill: Capillary refill takes less than 2 seconds.      Coloration: Skin is not jaundiced.      Findings: No bruising or lesion.   Neurological:      General: No focal deficit present.      Mental Status: He is alert.              Labs  Recent Labs   Lab 08/30/23  0428 08/31/23  0424 09/01/23  0531   WBC 7.07 7.57 7.17   HGB 8.0* 8.6* 8.5*   HCT 25.6* 28.0* 27.5*    192 180   MCV 77* 79* 79*   RDW 17.5* 17.2* 17.0*    138 141   K 4.1 3.9 3.7   * 108 111*   CO2 20* 18* 20*   BUN 28* 34* 32*   CREATININE 3.8* 4.2* 3.9*   GLU 80 134* 114*   PROT 5.9* 6.1 6.2   ALBUMIN 2.6* 2.6* 2.7*   BILITOT 0.3 0.2 0.1   AST 19 18 18   ALKPHOS 54* 60 65   ALT 12 9* <5*         Microbiology:  Microbiology Results (last 7 days)       Procedure Component Value Units Date/Time    Culture, Anaerobe [[id number]] Collected: 08/30/23 0836    Order Status: Completed Specimen: Wound from Finger, Right Hand Updated: 09/01/23 1235     Anaerobic Culture Culture in progress    Narrative:      Right middle finger PIP joint #2    Culture, Anaerobe [[id number]] Collected: 08/30/23 0833    Order Status: Completed Specimen: Wound from Finger, Right Hand Updated: 09/01/23 1233     Anaerobic Culture Culture in progress    Narrative:      Right middle finger PIP joint #1    AFB Culture & Smear [[id number]] Collected: 08/30/23 0836    Order Status: Completed Specimen: Wound from Finger, Right Hand Updated: 08/31/23 2127     AFB Culture & Smear Culture in progress     AFB CULTURE STAIN No acid fast bacilli seen.    Narrative:      Right middle finger PIP joint #2    AFB Culture & Smear [[id number]] Collected: 08/30/23 0833    Order Status: Completed Specimen: Wound from Finger, Right Hand Updated: 08/31/23 2127     AFB Culture & Smear Culture in progress     AFB CULTURE STAIN No acid fast bacilli seen.    Narrative:      Right middle finger PIP joint #1    Aerobic culture [[id number]] Collected: 08/30/23 0836    Order Status: Completed  Specimen: Wound from Finger, Right Hand Updated: 08/31/23 0830     Aerobic Bacterial Culture No growth    Narrative:      Right middle finger PIP joint #2    Aerobic culture [[id number]] Collected: 08/30/23 0833    Order Status: Completed Specimen: Wound from Finger, Right Hand Updated: 08/31/23 0830     Aerobic Bacterial Culture No growth    Narrative:      Right middle finger PIP joint #1    Gram stain [[id number]] Collected: 08/30/23 0836    Order Status: Completed Specimen: Wound from Finger, Right Hand Updated: 08/30/23 1019     Gram Stain Result Rare WBC's      No organisms seen    Narrative:      Right middle finger PIP joint #2    Gram stain [[id number]] Collected: 08/30/23 0833    Order Status: Completed Specimen: Wound from Finger, Right Hand Updated: 08/30/23 1012     Gram Stain Result Rare WBC's      No organisms seen    Narrative:      Right middle finger PIP joint #1    Fungus culture [[id number]] Collected: 08/30/23 0833    Order Status: Sent Specimen: Wound from Finger, Right Hand Updated: 08/30/23 0859    Fungus culture [[id number]] Collected: 08/30/23 0836    Order Status: Sent Specimen: Wound from Finger, Right Hand Updated: 08/30/23 0856            Significant Imaging: I have reviewed all pertinent imaging results/findings within the past 24 hours.  Imaging Results              X-Ray Hand 3 view Right (Final result)  Result time 08/29/23 22:57:12      Final result by Makenna Mora MD (08/29/23 22:57:12)                   Impression:      No acute fracture or dislocation.    Findings most compatible with osteoarthritis.    Additional nonacute incidental findings as above.      Electronically signed by: Makenna Mora  Date:    08/29/2023  Time:    22:57               Narrative:    EXAMINATION:  XR HAND COMPLETE 3 VIEW RIGHT    CLINICAL HISTORY:  pain;    TECHNIQUE:  PA, lateral, and oblique views of the right hand were performed.    COMPARISON:  Right thumb three views  07/11/2004    FINDINGS:  No acute fracture or dislocation is seen.  There is vascular calcifications throughout the soft tissues of the distal forearm and hand.  Bandage/clothing and IV tubing artifact overlies the distal forearm and wrist.  There is mild joint space narrowing the as well as osteophyte formation involving the IP joints of the index and middle finger and thumb and mild soft tissue swelling over the index and middle finger PIP joint without associated soft tissue calcifications.  There is no significant suspicious erosive change or lytic or sclerotic lesion.  Findings most compatible with osteoarthritis given the distribution.  There is ulnar plus variant noted.                                        MRI Hand Fingers Without Contrast Right (Final result)  Result time 08/29/23 20:31:55      Final result by Dylan Garnett MD (08/29/23 20:31:55)                   Impression:      Soft tissue swelling centered about the 3rd digit with PIP joint effusion and mild flexor tendon sheath fluid.  Recommend correlation for cellulitis and infectious or inflammatory tenosynovitis.  Sterility of the 3rd PIP joint cannot be confirmed radiologically.    Dorsal subluxation of the ulnar head with associated edema like signal about the radioulnar joint and dorsal ulnar marrow like edema.  Extensor carpi ulnaris is laterally subluxed with suspected tendinosis.    Questionable widening of the scapholunate interval not well evaluated.    This report was flagged in Epic as abnormal.    Electronically signed by resident: Bryon Phelps  Date:    08/29/2023  Time:    19:45    Electronically signed by: Dylan Garnett MD  Date:    08/29/2023  Time:    20:31               Narrative:    EXAMINATION:  MRI HAND FINGERS WITHOUT CONTRAST RIGHT    CLINICAL HISTORY:  Soft tissue infection suspected, hand, no prior imaging;    TECHNIQUE:  Multiple multiplanar sequences of the right hand without  contrast.    COMPARISON:  07/11/2004.    FINDINGS:  No acute fracture or evidence of osteomyelitis.    Circumferential soft tissue swelling about the 3rd digit centered about the PIP.  There is joint effusion at the PIP.  There is mild flexor tendon sheath fluid at the level of the 3rd middle phalanx.    There is mild edema soft tissue swelling about the dorsal 4th phalanx and lateral dorsum of the hand.    There is dorsal subluxation of the ulnar head with associated edema like signal about the radioulnar joint with possible capsular injury.  There is mild radioulnar joint fluid.  There is marrow edema like signal at the dorsal ulna with periosteal irregularity.  The extensor carpi ulnaris is laterally subluxed with tendinosis.    Questionable widening of the scapholunate interval with fluid signal at this level.  Scapholunate ligament is not well evaluated on this exam.    Flexor and extensor tendons are grossly intact.    Scattered small cystic foci of the carpals and metacarpals which may be degenerative.    Normal muscle bulk is maintained.

## 2023-09-01 NOTE — ASSESSMENT & PLAN NOTE
Ibrahima Phelps is a 67 y.o. male s/p R LF PIP washout 8/30.      - Weight bearing status: WBAT  - Pain control: Per APS  - Antibiotics: Dapto/ rocephin  - OT  - Cx: NGTD  - Dispo: Stable for diet. No need for further surgical intervention from orthopedic perspective. Will continue to follow from a distance and will schedule outpatient f/u with our clinic.

## 2023-09-01 NOTE — ASSESSMENT & PLAN NOTE
DDRT #1 in July 1992 at [facility] [place] which failed secondary to rejection. He had transplant nephrectomy of DDRT #1  DDRT #2 on 4/12/05

## 2023-09-01 NOTE — PROGRESS NOTES
[facility] Medicine  Progress Note    Patient Name: Ibrahima Phelps  MRN: [id number]  Patient Class: IP- Inpatient   Admission Date: 8/29/2023  Length of Stay: 3 days  Attending Physician: Sarah Braga MD  Primary Care Provider: Anatoliy Colinrdes MD        Subjective:     Principal Problem:Pyogenic arthritis of right hand        HPI:  67-year-old male with past medical history of CAD, atrial fibrillation, heart failure, CKD, hypertension, hyperlipidemia, kidney transplant on immunosuppressive therapy who presents to the emergency department with chief complaint of right 3rd digit pain and swelling that began 5 days ago.  It has progressively worsened since then.  He has pain with movement of the finger.  He denies trauma or injury.  No insect bite that he is aware of.  He denies symptoms of this nature in the past.  No paresthesias.  No fever, chills, nausea, vomiting.  Denies other worsening or alleviating factors.    In the ED patient afebrile and hemodynamically stable saturating well on room air. WBC wnl. ESR and CRP elevated. Orthopedic surgery consulted and evaluated patient with high suspicion for tenosynovitis. MRI ordered, patient started on Unasyn, admitted to the care of medicine for further evaluation and management.       Overview/Hospital Course:  MRI confirmed flexor tenosynovitis of right middle finger. Ortho consulted and patient taken to OR on 8/30/202 by Dr. Martin Larsen and underwent arthrotomy of right middle finger proximal interphalangeal joint with lavage for septic arthritis there was some murky fluid that was expressed from right middle finger. Fluid sent for cultures. Infectious Disease consulted to assist in antibiotic management. Kidney transplant medicine consulted on admit as patient having increased creatinine above baseline. Patient known kidney transplant patient with post transplant CKD stage 4 with creatinine 2.5-3.1 but creatinine 3.8 on admit.        Interval History: he reports feeling well this morning, ortho saw him and reports no need for further procedures so home eliquis resumed. No CX growth so far. He reporst feeling a lot better and is getting anxious to go home soon and feeling ready to be out of the hospital. Renal team at bedside during exam and recs to continue to hold lasix and to continue oral hydration at this time as Cr improving todady to 3.8 from 4's yesterday, still a little higher than prev baseline but on the right track. Unsure of plan for antibiotics, suspect may be IV but will see what ID plans will be and f/u their recs. I told him and his wife that pending ID recs and Cr tomorrow will determine if he is able to dc tomorrow but hopeful that tomorrow may be safe to dc. Outpatient OT recommended. If he does need IV antibitoics then okay to have a midline per renal team. Will awaiting ID thoughts. Denies new issues today, just feeling ready to go home      Review of Systems   Constitutional:  Negative for fever.   Respiratory:  Negative for cough and shortness of breath.    Cardiovascular:  Negative for chest pain.   Gastrointestinal:  Negative for abdominal pain, diarrhea and nausea.   Musculoskeletal:  Positive for arthralgias (Right middle finger).   Neurological:  Negative for dizziness.   Psychiatric/Behavioral:  Negative for agitation and confusion.      Objective:     Vital Signs (Most Recent):  Temp: 98.1 °F (36.7 °C) (08/30/23 1200)  Pulse: 56 (08/30/23 1200)  Resp: 12 (08/30/23 1200)  BP: 155/72 (08/30/23 1200)  SpO2: 98 % (08/30/23 1200) on room air Vital Signs (24h Range):  Temp:  [97.5 °F (36.4 °C)-97.8 °F (36.6 °C)] 97.5 °F (36.4 °C)  Pulse:  [58-64] 60  Resp:  [16-18] 18  SpO2:  [96 %-98 %] 96 %  BP: (139-152)/(67-74) 152/71     Weight: 95.3 kg (210 lb)  Body mass index is 27.71 kg/m².    Intake/Output Summary (Last 24 hours) at 9/1/2023 1250  Last data filed at 9/1/2023 0534  Gross per 24 hour   Intake 665 ml   Output  1200 ml   Net -535 ml           Physical Exam  Vitals and nursing note reviewed.   Constitutional:       General: He is awake. He is not in acute distress.     Appearance: Normal appearance. He is normal weight. He is not ill-appearing.   Cardiovascular:      Rate and Rhythm: Normal rate and regular rhythm.      Heart sounds: Normal heart sounds. No murmur heard.     No friction rub. No gallop.   Pulmonary:      Effort: Pulmonary effort is normal. No respiratory distress.      Breath sounds: Normal breath sounds. No wheezing.   Abdominal:      General: Abdomen is flat. Bowel sounds are normal. There is no distension.      Palpations: Abdomen is soft.      Tenderness: There is no abdominal tenderness.   Musculoskeletal:      Right lower leg: No edema.      Left lower leg: No edema.   Skin:     Findings: No erythema.      Comments: Right hand and right middle finger wrapped in surgical bandage   Neurological:      Mental Status: He is alert and oriented to person, place, and time.   Psychiatric:         Mood and Affect: Mood normal.         Behavior: Behavior normal. Behavior is cooperative.         Thought Content: Thought content normal.         Judgment: Judgment normal.             Significant Labs: CBC:   Recent Labs   Lab 08/31/23 0424 09/01/23  0531   WBC 7.57 7.17   HGB 8.6* 8.5*   HCT 28.0* 27.5*    180       CMP:   Recent Labs   Lab 08/31/23 0424 09/01/23  0531    141   K 3.9 3.7    111*   CO2 18* 20*   * 114*   BUN 34* 32*   CREATININE 4.2* 3.9*   CALCIUM 8.0* 7.5*   PROT 6.1 6.2   ALBUMIN 2.6* 2.7*   BILITOT 0.2 0.1   ALKPHOS 60 65   AST 18 18   ALT 9* <5*   ANIONGAP 12 10       Sed Rate   Date Value Ref Range Status   08/29/2023 >120 (H) 0 - 23 mm/Hr Final     CRP   Date Value Ref Range Status   08/29/2023 17.7 (H) 0.0 - 8.2 mg/L Final     Recent Labs     09/01/23  0531   TACROLIMUS 4.0*          Significant Imaging: I have reviewed all pertinent imaging results/findings within  the past 24 hours.      Assessment/Plan:      * Pyogenic arthritis of right hand  -presented with swelling and pain with fluid seen on MRI on admit with washout by ortho done. WBAT, ROMAT  -on dapto/rocephin now per ID recs, CX without growth so far, outpatient OT rec  -cannot get picc if needs IV long term, okay for midline per renal team if needs IV antibiotics. Awaiting further ID recs as unsure if PO vs IV and for final recs.      Hiccups  Thorazine x 1 as reports not improving on 8/31 with monitoring      Anemia in stage 4 chronic kidney disease  · Patient with known chronic anemia due to his advanced kidney disease. Baseline Hgb 9-10.  · Hgb 8.0 on 8/30.  · Patient has no signs of active bleeding and hemodynamically stable. Patient is asymptomatic related to anemia.   · Goal is keep Hgb >7 and consider blood transfusion if Hgb < 7 and asymptomatic or < 8 if patient becomes symptomatic.  · Plan is to monitor daily CBC.    Chronic diastolic CHF (congestive heart failure)  Patient is identified as having Diastolic (HFpEF) heart failure that is Chronic. CHF is currently controlled. Latest ECHO performed and demonstrates- Results for orders placed during the hospital encounter of 02/07/23    Echo    Interpretation Summary  · The left ventricle is normal in size with normal systolic function. The estimated ejection fraction is 65%.  · Normal right ventricular size with normal right ventricular systolic function.  · Grade II left ventricular diastolic dysfunction.  · Severe left atrial enlargement.  · There is mild aortic valve stenosis. Aortic valve area is 1.8 cm2; peak velocity is 2.9 m/s; mean gradient is 15 mmHg.  · Pulmonary HTN - the estimated PA systolic pressure is > 41mm Hg. (The IVC was not well visualized.)  Hold lasix on 8/31 per renal recs  Toprol XL and Hydralazine to treat.  Monitor clinical status closely. Monitor on telemetry. Patient is off CHF pathway.  Monitor strict Is&Os and daily weights.   Place on fluid restriction of 1.5 L. Continue to stress to patient importance of self efficacy and  on diet for CHF.    Acute renal failure superimposed on stage 4 chronic kidney disease  · Patient's baseline creatinine 2.7-3.2 consistent with CKD stage 4 but creatinine 3.8 on admit--> 4.2 now Patient has had creatinines this high in past 1 year. Patient with previous kidney transplant and last in 2005. IVF given on 8/31 per recs and lasix held and improving back around 3.8 now, continue oral hydration per renal recs today.   · US showing elevated resistance indices, Pr/Cr 2 grams.   · Consult Kidney Transplant medicine- rec hold lasix today and light IVF for 1L/24 hours. .   · Strict I+Os to closely monitor urine output.   · Avoid any nephrotoxic agents and meds.   · Renally adjust all medications based on GFR.   · Monitor daily BMP to follow renal function.     Other emphysema  · Chronic and controlled. Patient on Advair MDI diskus at home to treat with Duonebs and Albuterol MDI inhaler prn.  · No signs of exacerbation.  · Patient not on home oxygen.   · Advair not on formulary so will substitute with Breo inhaler 1 puff daily to treat.     Paroxysmal atrial fibrillation  · Patient in sinus rhythm on admit. Rate controled. Continue home Amiodarone for rhythm control and Toprol XL for rate control.  · Patient on long term anticoagulation with Apixiban as outpatient but held for surgery. And resumed 9/1 as no further plans for OR    H/O kidney transplant  Immunodeficiency due to treatment with immunosuppressive medication  - Continue home Tacrolimus and Prednisone to treat for chronic immunosuppression.   - Monitor daily Tacrolimus levels in hospital.   - Kidney Transplant Medicine team consulted to assist in immunosuppression.   Reports was not on MMF recently and renal team updated, they will assess long term in clinic (once out of infection as need to hold either way if he was on it recently now) if he may  need to be back on it again long term in the future)      Mixed hyperlipidemia  Chronic and controlled. Continue home Lipitor 10 mg po daily to treat.       Primary hypertension  Chronic and controlled. Continue home Cardura, Nifedipine, Hydralazine and Metoprolol to treat. Monitor vital signs every 4 hours. Target BP < 140/90.         VTE Risk Mitigation (From admission, onward)         Ordered     apixaban tablet 5 mg  2 times daily         09/01/23 0703     IP VTE HIGH RISK PATIENT  Once         08/29/23 1827     Place sequential compression device  Until discontinued         08/29/23 1827                Discharge Planning   RAMU: 9/2/2023     Code Status: Full Code   Is the patient medically ready for discharge?: No    Reason for patient still in hospital (select all that apply): Patient trending condition  Discharge Plan A: Home with family                  Sarah Braga MD  Department of Hospital Medicine   [facility] - Surgery

## 2023-09-01 NOTE — PLAN OF CARE
Problem: Adult Inpatient Plan of Care  Goal: Plan of Care Review  Outcome: Ongoing, Progressing  Goal: Patient-Specific Goal (Individualized)  Outcome: Ongoing, Progressing  Goal: Absence of Hospital-Acquired Illness or Injury  Outcome: Ongoing, Progressing  Goal: Optimal Comfort and Wellbeing  Outcome: Ongoing, Progressing  Goal: Readiness for Transition of Care  Outcome: Ongoing, Progressing     Problem: Fall Injury Risk  Goal: Absence of Fall and Fall-Related Injury  Outcome: Ongoing, Progressing     Problem: Skin Injury Risk Increased  Goal: Skin Health and Integrity  Outcome: Ongoing, Progressing     Problem: Fluid and Electrolyte Imbalance (Acute Kidney Injury/Impairment)  Goal: Fluid and Electrolyte Balance  Outcome: Ongoing, Progressing

## 2023-09-01 NOTE — HOSPITAL COURSE
Hospital Course Ortho consulted for right middle finger tenosynovitis and patient taken to OR 8/30 by Dr. Martin Larsen and underwent arthrotomy of right middle finger proximal interphalangeal joint with lavage for septic arthritis. There was some murky fluid that was expressed from right middle finger. Fluid sent for cultures. Cr elevated on admit. Kidney US unremarkable. UA unremarkable.      Interval History:   NAEON. Cr coming back down, 3.9 today. Patient urinating well.Cont to hold cellcept. Cont to monitor tacrolimus level and adjust as needed. Cont prednisone. Recommend cont hydration today. Can restart lasix outpatient as needed once kidney function returns to baseline. Midline ok for IV abx administration.

## 2023-09-01 NOTE — ASSESSMENT & PLAN NOTE
-presented with swelling and pain with fluid seen on MRI on admit with washout by ortho done. WBAT, ROMAT  -on dapto/rocephin now per ID recs, CX without growth so far, outpatient OT rec  -cannot get picc if needs IV long term, okay for midline per renal team if needs IV antibiotics. Awaiting further ID recs as unsure if PO vs IV and for final recs.

## 2023-09-01 NOTE — ASSESSMENT & PLAN NOTE
Immunodeficiency due to treatment with immunosuppressive medication  - Continue home Tacrolimus and Prednisone to treat for chronic immunosuppression.   - Monitor daily Tacrolimus levels in hospital.   - Kidney Transplant Medicine team consulted to assist in immunosuppression.   Reports was not on MMF recently and renal team updated, they will assess long term in clinic (once out of infection as need to hold either way if he was on it recently now) if he may need to be back on it again long term in the future)

## 2023-09-01 NOTE — ASSESSMENT & PLAN NOTE
· Patient in sinus rhythm on admit. Rate controled. Continue home Amiodarone for rhythm control and Toprol XL for rate control.  · Patient on long term anticoagulation with Apixiban as outpatient but held for surgery. And resumed 9/1 as no further plans for OR

## 2023-09-01 NOTE — PHARMACY MED REC
9/5/2023    Simno Smith MD    Chief Complaint   Patient presents with   • Consultation       Problem List Items Addressed This Visit    None  Visit Diagnoses     High cholesterol    -  Primary    Relevant Orders    Hepatic Function Panel (Completed)    Lipid Panel With Reflex (Completed)    Electrocardiogram 12-Lead    Glycohemoglobin    Hepatic Function Panel    Lipid Panel With Reflex    High triglycerides        Relevant Orders    Electrocardiogram 12-Lead    Glycohemoglobin    Hepatic Function Panel    Lipid Panel With Reflex          HPI:    Sampson España is a 13 year old male who was accompanied by his mother at the [facility] for initial evaluation of high cholesterol and triglyceride levels.     The following values were obtained:    Date: 6-2-21 - total cholesterol 237, HDL 43, non-HDL cholesterol 194, cholesterol/HDL ratio 5.5.    Date: 7-20-22-total cholesterol 230, HDL 43, non-.    Date:8-26-23-total cholesterol : 228, HDL 40, , triglycerides 234, non -, ratio 5.7, alk phos 230, AST 15, ALT 30.    For breakfast, he has: waffles, plain or sesame bagels with cream cheese.    For lunch, he has: ham and cheese, lettuce, spinach sandwich on white bread organic whole bread or bagel, chicken.    For dinner, he has: vegetarian meals, pork, steak, chicken, fish, vegetable, corn, potatoes, pasta.    For snacks, he has: fruit, fruit smoothies (fruit, milk, vanilla).     Sampson has 3 servings of vegetables per day, 3 servings of fruit per day, 0 to 1 servings of fish per week, and 2 to 3 servings of nuts (trail mix) per week.    Sampson has 2 to 3 bottles of water per day.    Sampson exercises for 1 hour per day, consisting of PE, wrestling, squats, bench press.    Sampson has had no cardiovascular symptoms, no chest pain, palpitations, syncope, dizziness, pallor, cyanosis, and enjoys normal growth, development, and activity.      Sampson is active, and is able to keep up with his friends.    Sampson's  [facility] Medication Reconciliation  Template    Patient was admitted on 1/23/2017 for Acute pericarditis.      Patient's prior to admission medication regimen was as follows:  Prescriptions Prior to Admission   Medication Sig Dispense Refill Last Dose    aspirin (ADULT ASPIRIN EC LOW STRENGTH) 81 MG EC tablet Take 1 tablet by mouth Daily.   1/23/2017 at Unknown time    atorvastatin (LIPITOR) 10 MG tablet Take 1 tablet (10 mg total) by mouth once daily. 90 tablet 3 1/23/2017 at Unknown time    calcium carbonate (TUMS) 200 mg calcium (500 mg) chewable tablet Take 4 tablets by mouth once daily.    1/23/2017 at Unknown time    CALCIUM CARBONATE/VITAMIN D3 (VITAMIN D-3 ORAL) Take 1,000 Units by mouth 2 (two) times daily.   1/23/2017 at Unknown time    cyclobenzaprine (FLEXERIL) 5 MG tablet Take 1 tablet (5 mg total) by mouth 3 (three) times daily as needed for Muscle spasms. 30 tablet 0 1/23/2017 at Unknown time    doxazosin (CARDURA) 4 MG tablet Take 6 mg by mouth every 12 (twelve) hours.   1/23/2017 at Unknown time    fexofenadine (ALLEGRA) 60 MG tablet Take 1 tablet by mouth Twice daily as needed.   1/23/2017 at Unknown time    fluticasone (FLONASE) 50 mcg/actuation nasal spray 1 spray by Each Nare route once daily. 16 g 6 1/23/2017 at Unknown time    furosemide (LASIX) 40 MG tablet Take 0.5 tablets (20 mg total) by mouth 2 (two) times daily. 180 tablet 2 1/23/2017 at Unknown time    hydrocodone-acetaminophen 5-325mg (NORCO) 5-325 mg per tablet Take 1 tablet by mouth every 4 (four) hours as needed. (Patient taking differently: Take 1 tablet by mouth every 4 (four) hours as needed for Pain. ) 10 tablet 0 1/23/2017 at Unknown time    metoprolol tartrate (LOPRESSOR) 50 MG tablet Take 1 tablet (50 mg total) by mouth 2 (two) times daily. 180 tablet 3 1/23/2017 at Unknown time    multivitamin (THERAGRAN) per tablet Take 1 tablet by mouth Daily.   1/23/2017 at Unknown time    mycophenolate (CELLCEPT) 250 mg  "Cap TAKE 2 CAPSULES BY MOUTH EVERY 12 HOURS TO PREVENT KIDNEY TRANSPLANT REJECTION 360 capsule 3 1/23/2017 at Unknown time    nifedipine (ADALAT CC) 60 MG TbSR TAKE 1 TABLET BY MOUTH TWICE DAILY. 180 tablet 1 1/23/2017 at Unknown time    NTS STEP 1 21 mg/24 hr PLACE 1 PATCH ONTO THE SKIN ONCE DAILY. 28 patch 6 1/23/2017 at Unknown time    pantoprazole (PROTONIX) 40 MG tablet TAKE 1 TABLET BY MOUTH DAILY 90 tablet 3 1/23/2017 at Unknown time    paricalcitol (ZEMPLAR) 1 MCG capsule One po MWF 90 capsule 3 1/23/2017 at Unknown time    potassium chloride SA (K-DUR,KLOR-CON) 20 MEQ tablet TAKE 1 TABLET BY MOUTH DAILY 90 tablet 3 1/23/2017 at Unknown time    predniSONE (DELTASONE) 5 MG tablet TAKE 1 TABLET BY MOUTH EVERY MORNING 90 tablet 3 1/23/2017 at Unknown time    spironolactone (ALDACTONE) 25 MG tablet Take 1 tablet (25 mg total) by mouth once daily. 90 tablet 3 1/23/2017 at Unknown time    tacrolimus (PROGRAF) 1 MG Cap TAKE 4 CAPSULES BY MOUTH IN THE MORNING AND 3 CAPSULES IN THE EVENING 630 capsule 4 1/23/2017 at Unknown time    warfarin (COUMADIN) 5 MG tablet Take 1 tablet (5mg.) by mouth daily, except 1.5 (7.5mg) on Wednesday,  Or as directed by [facility] 40 tablet 5 1/23/2017 at Unknown time         Please add appropriate    SmartPhrase below:  Admission Medication Reconciliation - Pharmacy Consult Note    The home medication history was taken by Osiris Phoenix, pharmacy technician.  Based on information gathered and subsequent review by the clinical pharmacist, the items below may need attention.     You may go to "Admission" then "Reconcile Home Medications" tabs to review and/or act upon these items. Based on information gathered and subsequent review by the clinical pharmacist, the items below may need attention.    Potentially problematic discrepancies with current MAR  o Patient IS taking the following which was not ordered upon admit  o ASA 81 QD (held)  o Calcium carbonate 1000 U " growth and development have been normal.     Sampson is in 9th grade.     Sampson wants to be a food technology when he grows up.     Sampson brushes his teeth 2 times per day, and flosses his teeth 1 time per day.    Family history: dad taking meds for hypertension and cholesterol, PGF had angioplasty age 58, after that had kidney failure that required dialysis, had surgery for aneurysm of Ao age 61,  66, PGM had bypass age 47, another age 69, valve replaced age 81, now in mid 80s (), MGM type 2 DM, otherwise negative for heart problems.    Review of Systems   Constitutional: Negative.    HENT: Negative.    Eyes: Negative.    Respiratory: Negative.    Cardiovascular: Negative.    Gastrointestinal: Negative.    Endocrine: Negative.    Genitourinary: Negative.    Musculoskeletal: Negative.    Skin: Negative.    Allergic/Immunologic: Positive for environmental allergies. Negative for food allergies.        Seasonal allergies, Zyrtec or eyedrops PRN.   Neurological: Negative.    Hematological: Negative.    Psychiatric/Behavioral: Negative.      Current Medications:      Summary of your Discharge Medications      as of 2023  3:13 PM     You have not been prescribed any medications.         Relevant Past Medical History:  History reviewed. No pertinent past medical history.    History reviewed. No pertinent surgical history.    ALLERGIES:  No Known Allergies     History reviewed. No pertinent family history..    Examination:   Blood pressure 116/61, pulse 70, resp. rate 19, height 5' 6\" (1.676 m), weight (!) 97.5 kg (214 lb 15.2 oz), SpO2 96 %.  Weight    23 1502   Weight: (!) 97.5 kg (214 lb 15.2 oz)       Physical Exam  Vitals and nursing note reviewed.   Constitutional:       Appearance: He is well-developed.   HENT:      Head: Normocephalic.      Nose: Nose normal.   Eyes:      Conjunctiva/sclera: Conjunctivae normal.   Cardiovascular:      Rate and Rhythm: Normal rate and regular rhythm.      Heart  BID  o Allegra 60 mg BID prn   o Furosemide 20 mg BID (held)  o Cellcept 500 mg BID (held)   o Zemplar 1 mcg Q M/W/F   o Potassium chloride 20 mEq QAM  o Spironolactone 25 mg QD    Please address this information as you see fit.  Feel free to contact us if you have any questions or require assistance.    Abril Byrd, Pharm.D  EXT [id number]         sounds: Normal heart sounds. No murmur heard.  Pulmonary:      Effort: Pulmonary effort is normal. No respiratory distress.      Breath sounds: Normal breath sounds. No wheezing.   Abdominal:      General: Bowel sounds are normal.      Palpations: Abdomen is soft. There is no mass.   Musculoskeletal:         General: Normal range of motion.      Cervical back: Normal range of motion.   Skin:     General: Skin is warm and dry.      Findings: No rash.   Neurological:      Mental Status: He is alert and oriented to person, place, and time.      Motor: No abnormal muscle tone.      Coordination: Coordination normal.   Psychiatric:         Behavior: Behavior normal.         Time: Total face to face time spent with patient 60 minutes. Greater than 50% of the total time was spent counseling and/or coordinating care.    ECG/ECHO:    An ECG 9-5-23 showed sinus rhythm, HR 70, normal axis, normal ventricular forces, and was otherwise normal.    Assessment/Plan:    Sampson has high cholesterol and triglyceride levels, which appear to be related lifestyle.    Sampson may have improvement in cholesterol and triglyceride levels with lifestyle changes.     Sampson should continue to exercise for at least 1 hour per day, and eat healthy foods.    He should cut down on simple carbohydrates, such as waffles, plain or sesame bagels, white bread, fruit smoothies, corn, and minimize red meat such as steak.    Sampson should eat plenty of vegetables, at least 2-3 servings per day, fruits, at least 2-3 servings per day, and eat nuts 4-5 times per week.    Sampson should eat fish 1-2 times per week, especially fish such as salmon, tuna, sardines, or mackerel.    Sampson should increase his intake of fiber and whole grain foods, and limit foods with sugar, corn syrup or other sweeteners.he  should cut down or eliminate eating \"white\" food (avoid \"cartoon\" cereals, avoid food with sugar, salt, white rice, white pasta, white bread), better to have brown rice, whole  grain pasta, whole grain bread.    Sampson should drink plenty of water, at least 2 to 3 bottles of water per day, more at times when he is exercising a lot, drinking caffeine, or in hot weather.    Sampson should not be at increased risk for anesthesia or surgery compared to others in the background population without cardiac problems, and he is clear from a cardiology viewpoint for anesthesia and surgery, and does not need any special precautions. In my opinion, he does not need SBE prophylaxis for surgical or dental procedures.    There should be no restriction placed on his activity from a cardiac standpoint. Sampson can participate in all activities, including competitive sports, but should stop and rest if he does not feel well (self-limited exercise).    Sampson should brush and floss his teeth once or twice a day, as this will decrease the chance for gum disease, oral abscess, heart attacks in the future.    Sampson should avoid smoking and vaping in the future.    Sampson should have fasting lipid levels and liver function tests in 3 months.    If all goes well, Sampson should return [facility] 4 months, when we will review lipid levels, and consider other tests as needed.    I spoke at length with Sampson España's mother and Sampson España about the above and answered their questions.  Please call me if you have further questions about today's visit, and thanks for allowing me to participate in the care of Sampson España.    Send copy to:   Referring Physician  Family    Instructed the family to return to your office.    Tong Barrios MD

## 2023-09-01 NOTE — PROGRESS NOTES
[facility] - [facility]  Orthopedics  Progress Note    Patient Name: Ibrahima Phelps  MRN: [id number]  Admission Date: 8/29/2023  Hospital Length of Stay: 3 days  Attending Provider: Sarah Braga MD  Primary Care Provider: Anatoliy Colindres MD  Follow-up For: Procedure(s) (LRB):  IRRIGATION AND DEBRIDEMENT, RIGHT MIDDLE FINGER (Right)    Post-Operative Day: 2 Days Post-Op  Subjective:     Principal Problem:Pyogenic arthritis of right hand    Principal Orthopedic Problem: Same    Interval History: Pt seen and examined at bedside. NAEON. Pain controlled.  VSS, AF. No other concerns stated. Pain improving. Cxs NGTD.     Review of patient's allergies indicates:   Allergen Reactions    Ace inhibitors Swelling    Verapamil Other (See Comments)     Other reaction(s): Unknown    Povidone-iodine Itching       Current Facility-Administered Medications   Medication    0.9%  NaCl infusion    acetaminophen tablet 1,000 mg    albuterol inhaler 2 puff    aluminum-magnesium hydroxide-simethicone 200-200-20 mg/5 mL suspension 30 mL    amiodarone tablet 200 mg    atorvastatin tablet 10 mg    cefTRIAXone (ROCEPHIN) 2 g in dextrose 5 % in water (D5W) 100 mL IVPB (MB+)    DAPTOmycin (CUBICIN) 760 mg in sodium chloride 0.9% SolP 50 mL IVPB    dextrose 10% bolus 125 mL 125 mL    dextrose 10% bolus 250 mL 250 mL    doxazosin tablet 4 mg    fluticasone furoate-vilanteroL 200-25 mcg/dose diskus inhaler 1 puff    glucagon (human recombinant) injection 1 mg    glucose chewable tablet 16 g    glucose chewable tablet 24 g    hydrALAZINE tablet 50 mg    melatonin tablet 6 mg    methocarbamoL tablet 500 mg    metoprolol succinate (TOPROL-XL) 24 hr tablet 25 mg    naloxone 0.4 mg/mL injection 0.02 mg    NIFEdipine 24 hr tablet 60 mg    ondansetron injection 4 mg    oxyCODONE immediate release tablet 5 mg    pantoprazole EC tablet 40 mg    polyethylene glycol packet 17 g    predniSONE tablet 5 mg    sodium chloride 0.9%  "flush 10 mL    tacrolimus capsule 2 mg     Objective:     Vital Signs (Most Recent):  Temp: 97.6 °F (36.4 °C) (09/01/23 0529)  Pulse: 61 (09/01/23 0529)  Resp: 18 (09/01/23 0542)  BP: (!) 150/72 (09/01/23 0529)  SpO2: 96 % (09/01/23 0529) Vital Signs (24h Range):  Temp:  [96.6 °F (35.9 °C)-98.1 °F (36.7 °C)] 97.6 °F (36.4 °C)  Pulse:  [58-63] 61  Resp:  [16-18] 18  SpO2:  [96 %-99 %] 96 %  BP: (139-150)/(67-74) 150/72     Weight: 95.3 kg (210 lb)  Height: 6' 1" (185.4 cm)  Body mass index is 27.71 kg/m².      Intake/Output Summary (Last 24 hours) at 9/1/2023 0623  Last data filed at 9/1/2023 0534  Gross per 24 hour   Intake 665 ml   Output 1500 ml   Net -835 ml         Ortho/SPM Exam      Right Upper Extremity    -Dressing c/d/I, this was removed and sutures c/d/I without any expressible purulence  - stiff finger ROM  -Compartments soft and compressible  -ROM full (shoulder/elbow/wrist)  -SILT M/R/U  -Motor intact Ain/PIN/U/M  -Brisk cap refill  -Warm well perfused extremities  -2+ Radial palpable       Significant Labs: BMP:   Recent Labs   Lab 08/31/23 0424   *      K 3.9      CO2 18*   BUN 34*   CREATININE 4.2*   CALCIUM 8.0*   MG 1.9       CBC:   Recent Labs   Lab 08/31/23 0424 09/01/23 0531   WBC 7.57 7.17   HGB 8.6* 8.5*   HCT 28.0* 27.5*    180       CMP:   Recent Labs   Lab 08/31/23 0424      K 3.9      CO2 18*   *   BUN 34*   CREATININE 4.2*   CALCIUM 8.0*   PROT 6.1   ALBUMIN 2.6*   BILITOT 0.2   ALKPHOS 60   AST 18   ALT 9*   ANIONGAP 12       All pertinent labs within the past 24 hours have been reviewed.    Significant Imaging: I have reviewed all pertinent imaging results/findings.    Assessment/Plan:     * Pyogenic arthritis of right hand  Ibrahima Phelps is a 67 y.o. male s/p R LF PIP washout 8/30.      - Weight bearing status: WBAT  - Pain control: Per APS  - Antibiotics: Dapto/ rocephin  - OT  - Cx: NGTD  - Dispo: Stable for diet. No need for further " surgical intervention from orthopedic perspective. Will continue to follow from a distance and will schedule outpatient f/u with our clinic.               Lukas Ricks MD  Orthopedics  [facility] - Surgery

## 2023-09-01 NOTE — SUBJECTIVE & OBJECTIVE
Principal Problem:Pyogenic arthritis of right hand    Principal Orthopedic Problem: Same    Interval History: Pt seen and examined at bedside. NAEON. Pain controlled.  VSS, AF. No other concerns stated. Pain improving. Cxs NGTD.     Review of patient's allergies indicates:   Allergen Reactions    Ace inhibitors Swelling    Verapamil Other (See Comments)     Other reaction(s): Unknown    Povidone-iodine Itching       Current Facility-Administered Medications   Medication    0.9%  NaCl infusion    acetaminophen tablet 1,000 mg    albuterol inhaler 2 puff    aluminum-magnesium hydroxide-simethicone 200-200-20 mg/5 mL suspension 30 mL    amiodarone tablet 200 mg    atorvastatin tablet 10 mg    cefTRIAXone (ROCEPHIN) 2 g in dextrose 5 % in water (D5W) 100 mL IVPB (MB+)    DAPTOmycin (CUBICIN) 760 mg in sodium chloride 0.9% SolP 50 mL IVPB    dextrose 10% bolus 125 mL 125 mL    dextrose 10% bolus 250 mL 250 mL    doxazosin tablet 4 mg    fluticasone furoate-vilanteroL 200-25 mcg/dose diskus inhaler 1 puff    glucagon (human recombinant) injection 1 mg    glucose chewable tablet 16 g    glucose chewable tablet 24 g    hydrALAZINE tablet 50 mg    melatonin tablet 6 mg    methocarbamoL tablet 500 mg    metoprolol succinate (TOPROL-XL) 24 hr tablet 25 mg    naloxone 0.4 mg/mL injection 0.02 mg    NIFEdipine 24 hr tablet 60 mg    ondansetron injection 4 mg    oxyCODONE immediate release tablet 5 mg    pantoprazole EC tablet 40 mg    polyethylene glycol packet 17 g    predniSONE tablet 5 mg    sodium chloride 0.9% flush 10 mL    tacrolimus capsule 2 mg     Objective:     Vital Signs (Most Recent):  Temp: 97.6 °F (36.4 °C) (09/01/23 0529)  Pulse: 61 (09/01/23 0529)  Resp: 18 (09/01/23 0542)  BP: (!) 150/72 (09/01/23 0529)  SpO2: 96 % (09/01/23 0529) Vital Signs (24h Range):  Temp:  [96.6 °F (35.9 °C)-98.1 °F (36.7 °C)] 97.6 °F (36.4 °C)  Pulse:  [58-63] 61  Resp:  [16-18] 18  SpO2:  [96 %-99 %] 96 %  BP: (139-150)/(67-74) 150/72  "    Weight: 95.3 kg (210 lb)  Height: 6' 1" (185.4 cm)  Body mass index is 27.71 kg/m².      Intake/Output Summary (Last 24 hours) at 9/1/2023 0623  Last data filed at 9/1/2023 0534  Gross per 24 hour   Intake 665 ml   Output 1500 ml   Net -835 ml          Ortho/SPM Exam      Right Upper Extremity    -Dressing c/d/I, this was removed and sutures c/d/I without any expressible purulence  - stiff finger ROM  -Compartments soft and compressible  -ROM full (shoulder/elbow/wrist)  -SILT M/R/U  -Motor intact Ain/PIN/U/M  -Brisk cap refill  -Warm well perfused extremities  -2+ Radial palpable       Significant Labs: BMP:   Recent Labs   Lab 08/31/23 0424   *      K 3.9      CO2 18*   BUN 34*   CREATININE 4.2*   CALCIUM 8.0*   MG 1.9       CBC:   Recent Labs   Lab 08/31/23 0424 09/01/23  0531   WBC 7.57 7.17   HGB 8.6* 8.5*   HCT 28.0* 27.5*    180       CMP:   Recent Labs   Lab 08/31/23 0424      K 3.9      CO2 18*   *   BUN 34*   CREATININE 4.2*   CALCIUM 8.0*   PROT 6.1   ALBUMIN 2.6*   BILITOT 0.2   ALKPHOS 60   AST 18   ALT 9*   ANIONGAP 12       All pertinent labs within the past 24 hours have been reviewed.    Significant Imaging: I have reviewed all pertinent imaging results/findings.  "

## 2023-09-01 NOTE — PROGRESS NOTES
[facility]    Referral received for the possible need of IV antibiotics at the time of discharge. I met with the patient and his wife and educated on home infusion. ID determined that the patient can go home on oral antibiotics.    [facility]  Paulina Solorzano RN, Clinical Educator  Cell [phone]  Office [phone]  Fax [phone]

## 2023-09-01 NOTE — SUBJECTIVE & OBJECTIVE
Interval History: he reports feeling well this morning, ortho saw him and reports no need for further procedures so home eliquis resumed. No CX growth so far. He reporst feeling a lot better and is getting anxious to go home soon and feeling ready to be out of the hospital. Renal team at bedside during exam and recs to continue to hold lasix and to continue oral hydration at this time as Cr improving todady to 3.8 from 4's yesterday, still a little higher than prev baseline but on the right track. Unsure of plan for antibiotics, suspect may be IV but will see what ID plans will be and f/u their recs. I told him and his wife that pending ID recs and Cr tomorrow will determine if he is able to dc tomorrow but hopeful that tomorrow may be safe to dc. Outpatient OT recommended. If he does need IV antibitoics then okay to have a midline per renal team. Will awaiting ID thoughts. Denies new issues today, just feeling ready to go home      Review of Systems   Constitutional:  Negative for fever.   Respiratory:  Negative for cough and shortness of breath.    Cardiovascular:  Negative for chest pain.   Gastrointestinal:  Negative for abdominal pain, diarrhea and nausea.   Musculoskeletal:  Positive for arthralgias (Right middle finger).   Neurological:  Negative for dizziness.   Psychiatric/Behavioral:  Negative for agitation and confusion.      Objective:     Vital Signs (Most Recent):  Temp: 98.1 °F (36.7 °C) (08/30/23 1200)  Pulse: 56 (08/30/23 1200)  Resp: 12 (08/30/23 1200)  BP: 155/72 (08/30/23 1200)  SpO2: 98 % (08/30/23 1200) on room air Vital Signs (24h Range):  Temp:  [97.5 °F (36.4 °C)-97.8 °F (36.6 °C)] 97.5 °F (36.4 °C)  Pulse:  [58-64] 60  Resp:  [16-18] 18  SpO2:  [96 %-98 %] 96 %  BP: (139-152)/(67-74) 152/71     Weight: 95.3 kg (210 lb)  Body mass index is 27.71 kg/m².    Intake/Output Summary (Last 24 hours) at 9/1/2023 1250  Last data filed at 9/1/2023 0534  Gross per 24 hour   Intake 665 ml   Output 1200  ml   Net -535 ml           Physical Exam  Vitals and nursing note reviewed.   Constitutional:       General: He is awake. He is not in acute distress.     Appearance: Normal appearance. He is normal weight. He is not ill-appearing.   Cardiovascular:      Rate and Rhythm: Normal rate and regular rhythm.      Heart sounds: Normal heart sounds. No murmur heard.     No friction rub. No gallop.   Pulmonary:      Effort: Pulmonary effort is normal. No respiratory distress.      Breath sounds: Normal breath sounds. No wheezing.   Abdominal:      General: Abdomen is flat. Bowel sounds are normal. There is no distension.      Palpations: Abdomen is soft.      Tenderness: There is no abdominal tenderness.   Musculoskeletal:      Right lower leg: No edema.      Left lower leg: No edema.   Skin:     Findings: No erythema.      Comments: Right hand and right middle finger wrapped in surgical bandage   Neurological:      Mental Status: He is alert and oriented to person, place, and time.   Psychiatric:         Mood and Affect: Mood normal.         Behavior: Behavior normal. Behavior is cooperative.         Thought Content: Thought content normal.         Judgment: Judgment normal.             Significant Labs: CBC:   Recent Labs   Lab 08/31/23 0424 09/01/23  0531   WBC 7.57 7.17   HGB 8.6* 8.5*   HCT 28.0* 27.5*    180       CMP:   Recent Labs   Lab 08/31/23 0424 09/01/23  0531    141   K 3.9 3.7    111*   CO2 18* 20*   * 114*   BUN 34* 32*   CREATININE 4.2* 3.9*   CALCIUM 8.0* 7.5*   PROT 6.1 6.2   ALBUMIN 2.6* 2.7*   BILITOT 0.2 0.1   ALKPHOS 60 65   AST 18 18   ALT 9* <5*   ANIONGAP 12 10       Sed Rate   Date Value Ref Range Status   08/29/2023 >120 (H) 0 - 23 mm/Hr Final     CRP   Date Value Ref Range Status   08/29/2023 17.7 (H) 0.0 - 8.2 mg/L Final     Recent Labs     09/01/23  0531   TACROLIMUS 4.0*          Significant Imaging: I have reviewed all pertinent imaging results/findings within the  past 24 hours.

## 2023-09-02 VITALS
HEIGHT: 73 IN | DIASTOLIC BLOOD PRESSURE: 70 MMHG | BODY MASS INDEX: 27.83 KG/M2 | SYSTOLIC BLOOD PRESSURE: 164 MMHG | OXYGEN SATURATION: 98 % | HEART RATE: 65 BPM | TEMPERATURE: 98 F | WEIGHT: 210 LBS | RESPIRATION RATE: 18 BRPM

## 2023-09-02 LAB
ALBUMIN SERPL BCP-MCNC: 2.9 G/DL (ref 3.5–5.2)
ALP SERPL-CCNC: 68 U/L (ref 55–135)
ALT SERPL W/O P-5'-P-CCNC: 5 U/L (ref 10–44)
ANION GAP SERPL CALC-SCNC: 11 MMOL/L (ref 8–16)
AST SERPL-CCNC: 19 U/L (ref 10–40)
BACTERIA SPEC AEROBE CULT: NO GROWTH
BACTERIA SPEC AEROBE CULT: NO GROWTH
BILIRUB SERPL-MCNC: 0.2 MG/DL (ref 0.1–1)
BUN SERPL-MCNC: 30 MG/DL (ref 8–23)
CALCIUM SERPL-MCNC: 7.4 MG/DL (ref 8.7–10.5)
CHLORIDE SERPL-SCNC: 111 MMOL/L (ref 95–110)
CO2 SERPL-SCNC: 18 MMOL/L (ref 23–29)
CREAT SERPL-MCNC: 3.9 MG/DL (ref 0.5–1.4)
EST. GFR  (NO RACE VARIABLE): 16 ML/MIN/1.73 M^2
GLUCOSE SERPL-MCNC: 70 MG/DL (ref 70–110)
PHOSPHATE SERPL-MCNC: 5.4 MG/DL (ref 2.7–4.5)
POTASSIUM SERPL-SCNC: 4 MMOL/L (ref 3.5–5.1)
PROT SERPL-MCNC: 6.6 G/DL (ref 6–8.4)
SODIUM SERPL-SCNC: 140 MMOL/L (ref 136–145)
TACROLIMUS BLD-MCNC: 3.1 NG/ML (ref 5–15)

## 2023-09-02 PROCEDURE — 36415 COLL VENOUS BLD VENIPUNCTURE: CPT | Performed by: HOSPITALIST

## 2023-09-02 PROCEDURE — 99239 PR HOSPITAL DISCHARGE DAY,>30 MIN: ICD-10-PCS | Mod: ,,, | Performed by: HOSPITALIST

## 2023-09-02 PROCEDURE — 80053 COMPREHEN METABOLIC PANEL: CPT | Performed by: HOSPITALIST

## 2023-09-02 PROCEDURE — 84100 ASSAY OF PHOSPHORUS: CPT | Performed by: HOSPITALIST

## 2023-09-02 PROCEDURE — 25000003 PHARM REV CODE 250: Performed by: INTERNAL MEDICINE

## 2023-09-02 PROCEDURE — 25000003 PHARM REV CODE 250: Performed by: HOSPITALIST

## 2023-09-02 PROCEDURE — 99239 HOSP IP/OBS DSCHRG MGMT >30: CPT | Mod: ,,, | Performed by: HOSPITALIST

## 2023-09-02 PROCEDURE — 63600175 PHARM REV CODE 636 W HCPCS: Performed by: FAMILY MEDICINE

## 2023-09-02 PROCEDURE — 25000003 PHARM REV CODE 250: Performed by: FAMILY MEDICINE

## 2023-09-02 PROCEDURE — 80197 ASSAY OF TACROLIMUS: CPT | Performed by: FAMILY MEDICINE

## 2023-09-02 PROCEDURE — 25000242 PHARM REV CODE 250 ALT 637 W/ HCPCS: Performed by: INTERNAL MEDICINE

## 2023-09-02 RX ORDER — TACROLIMUS 1 MG/1
3 CAPSULE ORAL 2 TIMES DAILY
Status: DISCONTINUED | OUTPATIENT
Start: 2023-09-02 | End: 2023-09-02 | Stop reason: HOSPADM

## 2023-09-02 RX ORDER — SODIUM BICARBONATE 650 MG/1
1300 TABLET ORAL 2 TIMES DAILY
Status: DISCONTINUED | OUTPATIENT
Start: 2023-09-02 | End: 2023-09-02 | Stop reason: HOSPADM

## 2023-09-02 RX ADMIN — APIXABAN 5 MG: 5 TABLET, FILM COATED ORAL at 08:09

## 2023-09-02 RX ADMIN — AMIODARONE HYDROCHLORIDE 200 MG: 200 TABLET ORAL at 08:09

## 2023-09-02 RX ADMIN — POLYETHYLENE GLYCOL 3350 17 G: 17 POWDER, FOR SOLUTION ORAL at 08:09

## 2023-09-02 RX ADMIN — FLUTICASONE FUROATE AND VILANTEROL TRIFENATATE 1 PUFF: 200; 25 POWDER RESPIRATORY (INHALATION) at 08:09

## 2023-09-02 RX ADMIN — NIFEDIPINE 60 MG: 30 TABLET, FILM COATED, EXTENDED RELEASE ORAL at 08:09

## 2023-09-02 RX ADMIN — TACROLIMUS 2 MG: 1 CAPSULE ORAL at 08:09

## 2023-09-02 RX ADMIN — HYDRALAZINE HYDROCHLORIDE 50 MG: 50 TABLET ORAL at 08:09

## 2023-09-02 RX ADMIN — METHOCARBAMOL 500 MG: 500 TABLET ORAL at 08:09

## 2023-09-02 RX ADMIN — DOXAZOSIN 4 MG: 2 TABLET ORAL at 08:09

## 2023-09-02 RX ADMIN — ACETAMINOPHEN 1000 MG: 500 TABLET ORAL at 05:09

## 2023-09-02 RX ADMIN — ATORVASTATIN CALCIUM 10 MG: 10 TABLET, FILM COATED ORAL at 08:09

## 2023-09-02 RX ADMIN — PREDNISONE 5 MG: 5 TABLET ORAL at 08:09

## 2023-09-02 RX ADMIN — PANTOPRAZOLE SODIUM 40 MG: 40 TABLET, DELAYED RELEASE ORAL at 08:09

## 2023-09-02 RX ADMIN — METOPROLOL SUCCINATE 25 MG: 25 TABLET, EXTENDED RELEASE ORAL at 08:09

## 2023-09-02 NOTE — PLAN OF CARE
Problem: Adult Inpatient Plan of Care  Goal: Plan of Care Review  Outcome: Ongoing, Progressing  Goal: Patient-Specific Goal (Individualized)  Outcome: Ongoing, Progressing  Goal: Absence of Hospital-Acquired Illness or Injury  Outcome: Ongoing, Progressing  Goal: Optimal Comfort and Wellbeing  Outcome: Ongoing, Progressing  Goal: Readiness for Transition of Care  Outcome: Ongoing, Progressing     Problem: Fall Injury Risk  Goal: Absence of Fall and Fall-Related Injury  Outcome: Ongoing, Progressing     Problem: Skin Injury Risk Increased  Goal: Skin Health and Integrity  Outcome: Ongoing, Progressing     Problem: Fluid and Electrolyte Imbalance (Acute Kidney Injury/Impairment)  Goal: Fluid and Electrolyte Balance  Outcome: Ongoing, Progressing     Problem: Oral Intake Inadequate (Acute Kidney Injury/Impairment)  Goal: Optimal Nutrition Intake  Outcome: Ongoing, Progressing     Problem: Renal Function Impairment (Acute Kidney Injury/Impairment)  Goal: Effective Renal Function  Outcome: Ongoing, Progressing     Patient did well throughout the day, only was given pain medication towards the end of the shift, very pleasant throughout the day. Wife at bedside throughout the day. Plan of care continues.

## 2023-09-02 NOTE — PLAN OF CARE
[facility]  Discharge Final Note    Primary Care Provider: Anatoliy Colindres MD    Expected Discharge Date: 9/2/2023    Final Discharge Note (most recent)       Final Note - 09/02/23 1419          Final Note    Assessment Type Final Discharge Note (P)      Anticipated Discharge Disposition Home or Self Care (P)      Hospital Resources/Appts/Education Provided Provided education on problems/symptoms using teachback;Provided patient/caregiver with written discharge plan information;Appointments scheduled and added to AVS (P)         Post-Acute Status    Post-Acute Authorization Other (P)      Other Status No Post-Acute Service Needs (P)    Outpatient PT/OT Referral.    Discharge Delays None known at this time (P)                      Important Message from Medicare  Important Message from Medicare regarding Discharge Appeal Rights: Given to patient/caregiver, Explained to patient/caregiver, Signed/date by patient/caregiver     Date IMM was signed: 09/01/23  Time IMM was signed: 0951    Contact Info       [facility] - [facility]   Specialty: Orthopedics    [address] [facility] [address]   Phone: [phone]       Next Steps: Follow up    Instructions: 2 weeks for hospital follow up    Ngozi Seymour MD   Specialty: Infectious Diseases    [address] [facility] [address] [facility] [address]   Phone: [phone]       Next Steps: Follow up    Instructions: 1 month follow up    labs next week        Next Steps: Follow up    Instructions: dr lehman messaged outpatient nephro to schedule labs next week, they will reach out for date to get them done          SW noting pt's discharge order. Pt. discharging to home with self-care. After review of pt's medical record, no discharge needs identified. Family to transport pt home.     Daylin Pierre LMSW

## 2023-09-02 NOTE — PLAN OF CARE
Problem: Adult Inpatient Plan of Care  Goal: Plan of Care Review  Outcome: Ongoing, Progressing  Goal: Patient-Specific Goal (Individualized)  Outcome: Ongoing, Progressing  Goal: Absence of Hospital-Acquired Illness or Injury  Outcome: Ongoing, Progressing  Goal: Optimal Comfort and Wellbeing  Outcome: Ongoing, Progressing  Goal: Readiness for Transition of Care  Outcome: Ongoing, Progressing     Problem: Fall Injury Risk  Goal: Absence of Fall and Fall-Related Injury  Outcome: Ongoing, Progressing     Problem: Skin Injury Risk Increased  Goal: Skin Health and Integrity  Outcome: Ongoing, Progressing     Problem: Fluid and Electrolyte Imbalance (Acute Kidney Injury/Impairment)  Goal: Fluid and Electrolyte Balance  Outcome: Ongoing, Progressing     Problem: Renal Function Impairment (Acute Kidney Injury/Impairment)  Goal: Effective Renal Function  Outcome: Ongoing, Progressing

## 2023-09-02 NOTE — DISCHARGE SUMMARY
DISCHARGE SUMMARY  Hospital Medicine    Team: [facility] HOSP MED K    Patient Name: Ibrahima Phelps  YOB: 1955    Admit Date: 8/29/2023    Discharge Date: 09/02/2023    Discharge Attending Physician: lj martinez md  Principal Diagnoses:  Active Hospital Problems    Diagnosis  POA    *Pyogenic arthritis of right hand [M00.9]  Yes    Hiccups [R06.6]  No    Anemia in stage 4 chronic kidney disease [N18.4, D63.1]  Yes    Chronic diastolic CHF (congestive heart failure) [I50.32]  Yes    Acute renal failure superimposed on stage 4 chronic kidney disease [N17.9, N18.4]  Yes    Immunodeficiency due to treatment with immunosuppressive medication [D84.821, Z79.899]  Not Applicable    Other emphysema [J43.8]  Yes    Paroxysmal atrial fibrillation [I48.0]  Yes    H/O kidney transplant [Z94.0]  Not Applicable    Primary hypertension [I10]  Yes    Mixed hyperlipidemia [E78.2]  Yes      Resolved Hospital Problems    Diagnosis Date Resolved POA    Flexor tenosynovitis of right middle finger [M65.9] 08/31/2023 Yes       Discharged Condition:  improved    Interval history- he reports feeling well this morning and is ready to discharge and anxious to go home since yesterday. His Cr was 3.9 yesterday and is still 3.9 today down from 4.2 at its high. His baseline usually runs lower 3's but he has been around 3.8 a time or 2 on recent labs before but is not usually his baseline. I reached out to dr lehman with [facility] nephro who reports he is okay to discharge today and she messaged his outpatient nephrologist to let them know about his admit and have them reach out to him to get labs done next week around mid week or so and I let him know this as well. He reports that its more convenient to get labs here as his transplant nephro docs are closer than his OP nephro general who is in [place]. I instruted him to hold his home lasix until he needs it for LE edema per renal notes from yesterday. ID recommended PO levaquin   (Renal dose) and doxy for him for 4 weeks end date 9/27/23. He is also on amio 200 for afib history, last QT in July mid 400s so will need to be cautious long term about levaquin use given interactions long term. He will have a f/u with dr montana in clinic in 1 month and ID will arrange for this. He has only needed oxycodone a few times for pain control and provided small amount on discharge for seere pain if needed while recovering from ortho surgery. He is very happy to discharge today and wife at bedside as well with him for discharge.    Temp:  [97.5 °F (36.4 °C)-98.1 °F (36.7 °C)]   Pulse:  [59-65]   Resp:  [16-18]   BP: (147-169)/(70-80)   SpO2:  [96 %-98 %]         Physical Exam  Vitals and nursing note reviewed.   Constitutional:       General: He is awake. He is not in acute distress.     Appearance: Normal appearance. He is normal weight. He is not ill-appearing.   Cardiovascular:      Rate and Rhythm: Normal rate and regular rhythm.      Heart sounds: Normal heart sounds. No murmur heard.     No friction rub. No gallop.   Pulmonary:      Effort: Pulmonary effort is normal. No respiratory distress.      Breath sounds: Normal breath sounds. No wheezing.   Abdominal:      General: Abdomen is flat. Bowel sounds are normal. There is no distension.      Palpations: Abdomen is soft.      Tenderness: There is no abdominal tenderness.   Musculoskeletal:      Right lower leg: No edema.      Left lower leg: No edema.   Skin:     Findings: No erythema.      Comments: Right hand and right middle finger wrapped in surgical bandage   Neurological:      Mental Status: He is alert and oriented to person, place, and time.   Psychiatric:         Mood and Affect: Mood normal.         Behavior: Behavior normal. Behavior is cooperative.         Thought Content: Thought content normal.         Judgment: Judgment normal.        HOSPITAL COURSE:      Initial Presentation:     67-year-old male with past medical history of CAD,  atrial fibrillation, heart failure, CKD, hypertension, hyperlipidemia, kidney transplant on immunosuppressive therapy who presents to the emergency department with chief complaint of right 3rd digit pain and swelling that began 5 days ago.  It has progressively worsened since then.  He has pain with movement of the finger.  He denies trauma or injury.  No insect bite that he is aware of.  He denies symptoms of this nature in the past.  No paresthesias.  No fever, chills, nausea, vomiting.  Denies other worsening or alleviating factors.     In the ED patient afebrile and hemodynamically stable saturating well on room air. WBC wnl. ESR and CRP elevated. Orthopedic surgery consulted and evaluated patient with high suspicion for tenosynovitis. MRI ordered, patient started on Unasyn, admitted to the care of medicine for further evaluation and management.     Course of Principle Problem for Admission:    Pyogenic arthritis of right hand  -presented with swelling and pain with fluid seen on MRI on admit with washout by ortho done. WBAT, ROMAT  -on dapto/rocephin  IP per ID recs, CX without growth so far, outpatient OT rec (Ref placed)  -recs for levaquin + doxy with levaquin renal dosed. If kept on long term would need to repeat EKG as OP on ID f/u given on amiodarone also to check QT long term with levaquin  -will have ID f/u with Dr Seymour in 1 month  -pain control with multimodals on dc and ortho f/u in next 2 weeks for bandage removal        Hiccups  Thorazine x 1 on 8/31 and ipmroved        Anemia in stage 4 chronic kidney disease  Patient with known chronic anemia due to his advanced kidney disease. Baseline Hgb 9-10.  Hgb 8.0 on 8/30.  Patient has no signs of active bleeding and hemodynamically stable. Patient is asymptomatic related to anemia.        Chronic diastolic CHF (congestive heart failure)  Patient is identified as having Diastolic (HFpEF) heart failure that is Chronic. CHF is currently controlled. Latest  ECHO performed and demonstrates- Results for orders placed during the hospital encounter of 02/07/23     Echo     Interpretation Summary  · The left ventricle is normal in size with normal systolic function. The estimated ejection fraction is 65%.  · Normal right ventricular size with normal right ventricular systolic function.  · Grade II left ventricular diastolic dysfunction.  · Severe left atrial enlargement.  · There is mild aortic valve stenosis. Aortic valve area is 1.8 cm2; peak velocity is 2.9 m/s; mean gradient is 15 mmHg.  · Pulmonary HTN - the estimated PA systolic pressure is > 41mm Hg. (The IVC was not well visualized.)  Hold lasix on 8/31 per renal recs and on dc  Toprol XL and Hydralazine to treat. Mildly elevated BPS to 150s at times so if persists on f/u to stay around here would recommend going up on hydrlazine doses on clinic f/u     Acute renal failure superimposed on stage 4 chronic kidney disease  Patient's baseline creatinine 2.7-3.2 consistent with CKD stage 4 but creatinine 3.8 on admit--> 4.2 now Patient has had creatinines this high in past 1 year. Patient with previous kidney transplant and last in 2005. IVF given on 8/31 per recs and lasix held and improving back around 3.9 now and holding here on discharge, okay for dc per dr lehman and needs repeat labs this week and she messaged home nephro MD to let them know to schedule. Hold lasix until needed to take PRN for LE edema in future per renal note on 9/1 and instructed patient on this also.  US showing elevated resistance indices, Pr/Cr 2 grams.   . .      Other emphysema  Chronic and controlled. Patient on Advair MDI diskus at home to treat with Duonebs and Albuterol MDI inhaler prn.  No signs of exacerbation.  Patient not on home oxygen.   Resume meds on discharge     Paroxysmal atrial fibrillation  Patient in sinus rhythm on admit. Rate controled. Continue home Amiodarone for rhythm control and Toprol XL for rate control. Will need  "to watch long term as if ID pursues longer levaquin thearpy will need to consider repeat EKG on clinic f/u to reassess QT with amio use. Recently was mid 400s in july  Patient on long term anticoagulation with Apixiban as outpatient but held for surgery. And resumed 9/1 as no further plans for OR     H/O kidney transplant  Immunodeficiency due to treatment with immunosuppressive medication  - Continue home Tacrolimus and Prednisone to treat for chronic immunosuppression.   - Monitored daily Tacrolimus levels in hospital.   - Kidney Transplant Medicine team consulted to assist in immunosuppression and appreciate following while in the hospital  Reports was not on MMF recently and renal team updated, they will assess long term in clinic (once out of infection as need to hold either way if he was on it recently now) if he may need to be back on it again long term in the future)        Mixed hyperlipidemia  Chronic and controlled. Continue home Lipitor 10 mg po daily to treat.         Primary hypertension  Chronic and controlled. Continue home Cardura, Nifedipine, Hydralazine and Metoprolol to treat.             Consults: ortho, ID, transplant renal    Last CBC/BMP:    CBC/Anemia Labs: Coags:    Recent Labs   Lab 08/30/23 0428 08/31/23 0424 09/01/23  0531   WBC 7.07 7.57 7.17   HGB 8.0* 8.6* 8.5*   HCT 25.6* 28.0* 27.5*    192 180   MCV 77* 79* 79*   RDW 17.5* 17.2* 17.0*    No results for input(s): "PT", "INR", "APTT" in the last 168 hours.     Chemistries:   Recent Labs   Lab 08/30/23 0428 08/31/23 0424 09/01/23  0531 09/02/23  0500    138 141 140   K 4.1 3.9 3.7 4.0   * 108 111* 111*   CO2 20* 18* 20* 18*   BUN 28* 34* 32* 30*   CREATININE 3.8* 4.2* 3.9* 3.9*   CALCIUM 8.1* 8.0* 7.5* 7.4*   PROT 5.9* 6.1 6.2 6.6   BILITOT 0.3 0.2 0.1 0.2   ALKPHOS 54* 60 65 68   ALT 12 9* <5* 5*   AST 19 18 18 19   MG 1.9 1.9 2.0  --    PHOS 5.0* 4.3 5.2* 5.4*              Special Treatments/Procedures: "   Procedure(s) (LRB):  IRRIGATION AND DEBRIDEMENT, RIGHT MIDDLE FINGER (Right)     Disposition: Home or Self Care      Future Scheduled Appointments:  Future Appointments   Date Time Provider Department Center   9/13/2023  9:45 AM Travis Spann NP NOM ORTHO [address] [facility]   9/13/2023  2:30 PM CARDIAC, PET IMAGING [facility] CARDPET [facility]   9/15/2023  2:20 PM LAB, APPOINTMENT [facility] LAB VNP [facility]   9/15/2023  2:25 PM SPECIMEN, [facility] SPECLAB [facility]   9/15/2023  3:00 PM EKG, APPT [facility] EKG [facility]   9/15/2023  3:20 PM BE Parmar MD [facility] ARRHYTH [facility]   9/18/2023  3:00 PM Emre Latif MD [facility] NEPH [place]   9/26/2023 12:40 PM Kim Bautista NP [facility] SLEEP [facility]   10/11/2023 10:30 AM Travis Spann NP [facility] ORTHO [address] [facility]         Discharge Medication List:          Medication List        START taking these medications      doxycycline 100 MG Cap  Commonly known as: VIBRAMYCIN  Take 1 capsule (100 mg total) by mouth every 12 (twelve) hours.     levoFLOXacin 750 MG tablet  Commonly known as: LEVAQUIN  Take 1 tablet (750 mg total) by mouth every other day.     oxyCODONE 5 MG immediate release tablet  Commonly known as: ROXICODONE  Take 1 tablet (5 mg total) by mouth every 4 (four) hours as needed (pain scale 7-10).            CHANGE how you take these medications      ADVAIR -21 mcg/actuation Hfaa inhaler  Generic drug: fluticasone propion-salmeterol 115-21 mcg/dose  Inhale 2 puffs into the lungs every 12 (twelve) hours. Controller  What changed:   when to take this  reasons to take this     amiodarone 200 MG Tab  Commonly known as: PACERONE  Take 1 tablet (200 mg total) by mouth once daily.  What changed: See the new instructions.     fluticasone propionate 50 mcg/actuation nasal spray  Commonly known as: FLONASE  1 spray (50 mcg total) by Each Nostril route once daily.  What changed:   when to take this  reasons to take this      furosemide 40 MG tablet  Commonly known as: LASIX  Take 0.5 tablets (20 mg total) by mouth 2 (two) times daily. HOLD this medication on discharge per nephrology recs, and resume only once needed for lower extremity edema.  What changed: additional instructions            CONTINUE taking these medications      acetaminophen 500 MG tablet  Commonly known as: TYLENOL  Take 1 tablet (500 mg total) by mouth every 6 (six) hours as needed for Pain.     * albuterol 90 mcg/actuation inhaler  Commonly known as: VENTOLIN HFA  Inhale 2 puffs into the lungs every 6 (six) hours as needed for Wheezing or Shortness of Breath. Rescue     * albuterol 2.5 mg /3 mL (0.083 %) nebulizer solution  Commonly known as: PROVENTIL  Take 3 mLs (2.5 mg total) by nebulization every 6 (six) hours as needed for Wheezing. Rescue     ammonium lactate 12 % Crea  Apply 1 g topically once daily.     apixaban 5 mg Tab  Commonly known as: ELIQUIS  Take 1 tablet (5 mg total) by mouth 2 (two) times daily.     aspirin 81 MG EC tablet  Commonly known as: ECOTRIN     atorvastatin 10 MG tablet  Commonly known as: LIPITOR  TAKE 1 TABLET BY MOUTH EVERY DAY     b complex vitamins tablet  Commonly known as: B COMPLEX-VITAMIN B12  Take 1 tablet by mouth once daily.     CALCIUM 600 ORAL     doxazosin 4 MG tablet  Commonly known as: CARDURA  Take 1 tablet (4 mg total) by mouth every 12 (twelve) hours.     famotidine 20 MG tablet  Commonly known as: PEPCID  Take 1 tablet (20 mg total) by mouth 2 (two) times daily.     gabapentin 100 MG capsule  Commonly known as: NEURONTIN  Take 1 capsule (100 mg total) by mouth as needed.     hydrALAZINE 50 MG tablet  Commonly known as: APRESOLINE  TAKE 1 TABLET (50 MG TOTAL) BY MOUTH 3 (THREE) TIMES DAILY.     metoprolol succinate 25 MG 24 hr tablet  Commonly known as: TOPROL-XL  Take 1 tablet (25 mg total) by mouth once daily.     MITIGARE 0.6 mg Cap  Generic drug: colchicine (gout)  TAKE 1 CAPSULE BY MOUTH TWICE A DAY AS NEEDED  GOUT FLARE UP TO 3 DAYS     multivitamin per tablet  Commonly known as: THERAGRAN     NIFEdipine 60 MG (OSM) 24 hr tablet  Commonly known as: PROCARDIA-XL  Take 1 tablet (60 mg total) by mouth 2 (two) times a day.     omeprazole 20 MG capsule  Commonly known as: PRILOSEC  Take 1 capsule (20 mg total) by mouth once daily.     paricalcitoL 1 MCG capsule  Commonly known as: ZEMPLAR  TAKE 1 CAPSULE ON MONDAY, WEDNESDAY AND FRIDAY     predniSONE 5 MG tablet  Commonly known as: DELTASONE  TAKE 1 TABLET BY MOUTH EVERY DAY IN THE MORNING     pulse oximeter device  Commonly known as: pulse oximeter  by Apply Externally route 2 (two) times a day. Use twice daily at 8 AM and 3 PM and record the value in [employer] as directed.     tacrolimus 1 MG Cap  Commonly known as: PROGRAF  Take 2 capsules (2 mg total) by mouth every morning AND 2 capsules (2 mg total) every evening.     vitamin D 1000 units Tab  Commonly known as: VITAMIN D3           * This list has 2 medication(s) that are the same as other medications prescribed for you. Read the directions carefully, and ask your doctor or other care provider to review them with you.                   Where to Get Your Medications        These medications were sent to [facility] [address] [address]      Hours: Mon-Fri 7a-7p, Sat-Sun 10a-4p Phone: [phone]   doxycycline 100 MG Cap  levoFLOXacin 750 MG tablet  oxyCODONE 5 MG immediate release tablet       Information about where to get these medications is not yet available    Ask your nurse or doctor about these medications  amiodarone 200 MG Tab  furosemide 40 MG tablet  gabapentin 100 MG capsule         Patient Instructions:  Discharge Procedure Orders   Ambulatory referral/consult to Physical/Occupational Therapy   Standing Status: Future   Referral Priority: Routine Referral Type: Physical Medicine   Referral Reason: Specialty Services Required   Number of Visits Requested: 1     Diet renal      Notify your health care provider if you experience any of the following:  temperature >100.4     Notify your health care provider if you experience any of the following:  severe uncontrolled pain     Notify your health care provider if you experience any of the following:  redness, tenderness, or signs of infection (pain, swelling, redness, odor or green/yellow discharge around incision site)     Activity as tolerated       At the time of discharge patient was told to take all medications as prescribed, to keep all followup appointments, and to call their primary care physician or return to the emergency room if they have any worsening or concerning symptoms.    I spent 35 minutes preparing the discharge and coordinating specialist recommmendations for discharge.      Signing Physician:  Sarah Braga MD

## 2023-09-02 NOTE — SUBJECTIVE & OBJECTIVE
Principal Problem:Pyogenic arthritis of right hand    Principal Orthopedic Problem: Same    Interval History: Pt seen and examined at bedside. NAEON. Pain controlled.  VSS, AF. No other concerns stated. Pain improving. Cxs NGTD.     Review of patient's allergies indicates:   Allergen Reactions    Ace inhibitors Swelling    Verapamil Other (See Comments)     Other reaction(s): Unknown    Povidone-iodine Itching       Current Facility-Administered Medications   Medication    acetaminophen tablet 1,000 mg    albuterol inhaler 2 puff    aluminum-magnesium hydroxide-simethicone 200-200-20 mg/5 mL suspension 30 mL    amiodarone tablet 200 mg    apixaban tablet 5 mg    atorvastatin tablet 10 mg    cefTRIAXone (ROCEPHIN) 2 g in dextrose 5 % in water (D5W) 100 mL IVPB (MB+)    DAPTOmycin (CUBICIN) 760 mg in sodium chloride 0.9% SolP 50 mL IVPB    dextrose 10% bolus 125 mL 125 mL    dextrose 10% bolus 250 mL 250 mL    doxazosin tablet 4 mg    fluticasone furoate-vilanteroL 200-25 mcg/dose diskus inhaler 1 puff    glucagon (human recombinant) injection 1 mg    glucose chewable tablet 16 g    glucose chewable tablet 24 g    hydrALAZINE tablet 50 mg    melatonin tablet 6 mg    methocarbamoL tablet 500 mg    metoprolol succinate (TOPROL-XL) 24 hr tablet 25 mg    naloxone 0.4 mg/mL injection 0.02 mg    NIFEdipine 24 hr tablet 60 mg    ondansetron injection 4 mg    oxyCODONE immediate release tablet 5 mg    pantoprazole EC tablet 40 mg    polyethylene glycol packet 17 g    predniSONE tablet 5 mg    sodium chloride 0.9% flush 10 mL    tacrolimus capsule 2 mg     Objective:     Vital Signs (Most Recent):  Temp: 98.1 °F (36.7 °C) (09/02/23 0429)  Pulse: 62 (09/02/23 0429)  Resp: 16 (09/02/23 0429)  BP: (!) 169/80 (09/02/23 0429)  SpO2: 97 % (09/02/23 0429) Vital Signs (24h Range):  Temp:  [97.5 °F (36.4 °C)-98.1 °F (36.7 °C)] 98.1 °F (36.7 °C)  Pulse:  [59-64] 62  Resp:  [16-18] 16  SpO2:  [96 %-98 %] 97 %  BP: (147-169)/(67-80)  "169/80     Weight: 95.3 kg (210 lb)  Height: 6' 1" (185.4 cm)  Body mass index is 27.71 kg/m².      Intake/Output Summary (Last 24 hours) at 9/2/2023 0727  Last data filed at 9/2/2023 0503  Gross per 24 hour   Intake --   Output 800 ml   Net -800 ml          Ortho/SPM Exam      Right Upper Extremity    -Dressing c/d/I, this was removed and sutures c/d/I without any expressible purulence  - stiff finger ROM  -Compartments soft and compressible  -ROM full (shoulder/elbow/wrist)  -SILT M/R/U  -Motor intact Ain/PIN/U/M  -Brisk cap refill  -Warm well perfused extremities  -2+ Radial palpable       Significant Labs: BMP:   Recent Labs   Lab 09/01/23  0531 09/02/23  0500   * 70    140   K 3.7 4.0   * 111*   CO2 20* 18*   BUN 32* 30*   CREATININE 3.9* 3.9*   CALCIUM 7.5* 7.4*   MG 2.0  --        CBC:   Recent Labs   Lab 09/01/23  0531   WBC 7.17   HGB 8.5*   HCT 27.5*          CMP:   Recent Labs   Lab 09/01/23  0531 09/02/23  0500    140   K 3.7 4.0   * 111*   CO2 20* 18*   * 70   BUN 32* 30*   CREATININE 3.9* 3.9*   CALCIUM 7.5* 7.4*   PROT 6.2 6.6   ALBUMIN 2.7* 2.9*   BILITOT 0.1 0.2   ALKPHOS 65 68   AST 18 19   ALT <5* 5*   ANIONGAP 10 11       All pertinent labs within the past 24 hours have been reviewed.    Significant Imaging: I have reviewed all pertinent imaging results/findings.  "

## 2023-09-05 ENCOUNTER — PATIENT MESSAGE (OUTPATIENT)
Dept: ADMINISTRATIVE | Facility: CLINIC | Age: 68
End: 2023-09-05
Payer: MEDICARE

## 2023-09-05 ENCOUNTER — PATIENT OUTREACH (OUTPATIENT)
Dept: ADMINISTRATIVE | Facility: CLINIC | Age: 68
End: 2023-09-05
Payer: MEDICARE

## 2023-09-05 NOTE — PROGRESS NOTES
C3 nurse attempted to contact Ibrahima Phelps  for a TCC post hospital discharge follow up call. No answer. Left voicemail with callback information. The patient does not have a scheduled HOSFU appointment. Message sent to PCP staff for assistance with scheduling visit with patient.

## 2023-09-06 ENCOUNTER — PATIENT MESSAGE (OUTPATIENT)
Dept: NEPHROLOGY | Facility: CLINIC | Age: 68
End: 2023-09-06
Payer: MEDICARE

## 2023-09-06 LAB
BACTERIA SPEC ANAEROBE CULT: NORMAL
BACTERIA SPEC ANAEROBE CULT: NORMAL

## 2023-09-06 NOTE — PROGRESS NOTES
C3 nurse spoke with Ibrahimastephanie Phelps ( wife)  for a TCC post hospital discharge follow up call. The patient has a scheduled HOSFU appointment with SPENCER Cope on 09/11/2023 @ 1100.    Message sent to PCP Staff

## 2023-09-07 ENCOUNTER — OFFICE VISIT (OUTPATIENT)
Dept: INFECTIOUS DISEASES | Facility: CLINIC | Age: 68
End: 2023-09-07
Payer: MEDICARE

## 2023-09-07 ENCOUNTER — CLINICAL SUPPORT (OUTPATIENT)
Dept: INFECTIOUS DISEASES | Facility: CLINIC | Age: 68
End: 2023-09-07
Payer: MEDICARE

## 2023-09-07 VITALS
HEART RATE: 72 BPM | WEIGHT: 212.94 LBS | TEMPERATURE: 98 F | BODY MASS INDEX: 28.22 KG/M2 | HEIGHT: 73 IN | DIASTOLIC BLOOD PRESSURE: 64 MMHG | SYSTOLIC BLOOD PRESSURE: 174 MMHG

## 2023-09-07 DIAGNOSIS — Z00.00 PREVENTATIVE HEALTH CARE: Primary | ICD-10-CM

## 2023-09-07 DIAGNOSIS — M00.9 PYOGENIC ARTHRITIS OF RIGHT HAND, DUE TO UNSPECIFIED ORGANISM: Primary | ICD-10-CM

## 2023-09-07 DIAGNOSIS — Z94.0 H/O KIDNEY TRANSPLANT: ICD-10-CM

## 2023-09-07 DIAGNOSIS — Z79.899 LONG TERM CURRENT USE OF IMMUNOSUPPRESSIVE DRUG: ICD-10-CM

## 2023-09-07 PROCEDURE — 1111F PR DISCHARGE MEDS RECONCILED W/ CURRENT OUTPATIENT MED LIST: ICD-10-PCS | Mod: CPTII,S$GLB,, | Performed by: INTERNAL MEDICINE

## 2023-09-07 PROCEDURE — 3044F HG A1C LEVEL LT 7.0%: CPT | Mod: CPTII,S$GLB,, | Performed by: INTERNAL MEDICINE

## 2023-09-07 PROCEDURE — 3044F PR MOST RECENT HEMOGLOBIN A1C LEVEL <7.0%: ICD-10-PCS | Mod: CPTII,S$GLB,, | Performed by: INTERNAL MEDICINE

## 2023-09-07 PROCEDURE — 99214 OFFICE O/P EST MOD 30 MIN: CPT | Mod: S$GLB,,, | Performed by: INTERNAL MEDICINE

## 2023-09-07 PROCEDURE — 3062F POS MACROALBUMINURIA REV: CPT | Mod: CPTII,S$GLB,, | Performed by: INTERNAL MEDICINE

## 2023-09-07 PROCEDURE — 99214 PR OFFICE/OUTPT VISIT, EST, LEVL IV, 30-39 MIN: ICD-10-PCS | Mod: S$GLB,,, | Performed by: INTERNAL MEDICINE

## 2023-09-07 PROCEDURE — 1159F MED LIST DOCD IN RCRD: CPT | Mod: CPTII,S$GLB,, | Performed by: INTERNAL MEDICINE

## 2023-09-07 PROCEDURE — 99999 PR PBB SHADOW E&M-EST. PATIENT-LVL V: ICD-10-PCS | Mod: PBBFAC,,, | Performed by: INTERNAL MEDICINE

## 2023-09-07 PROCEDURE — 3077F PR MOST RECENT SYSTOLIC BLOOD PRESSURE >= 140 MM HG: ICD-10-PCS | Mod: CPTII,S$GLB,, | Performed by: INTERNAL MEDICINE

## 2023-09-07 PROCEDURE — 3066F PR DOCUMENTATION OF TREATMENT FOR NEPHROPATHY: ICD-10-PCS | Mod: CPTII,S$GLB,, | Performed by: INTERNAL MEDICINE

## 2023-09-07 PROCEDURE — 1159F PR MEDICATION LIST DOCUMENTED IN MEDICAL RECORD: ICD-10-PCS | Mod: CPTII,S$GLB,, | Performed by: INTERNAL MEDICINE

## 2023-09-07 PROCEDURE — 3008F PR BODY MASS INDEX (BMI) DOCUMENTED: ICD-10-PCS | Mod: CPTII,S$GLB,, | Performed by: INTERNAL MEDICINE

## 2023-09-07 PROCEDURE — 1126F AMNT PAIN NOTED NONE PRSNT: CPT | Mod: CPTII,S$GLB,, | Performed by: INTERNAL MEDICINE

## 2023-09-07 PROCEDURE — 99499 UNLISTED E&M SERVICE: CPT | Mod: S$GLB,,, | Performed by: INTERNAL MEDICINE

## 2023-09-07 PROCEDURE — 3077F SYST BP >= 140 MM HG: CPT | Mod: CPTII,S$GLB,, | Performed by: INTERNAL MEDICINE

## 2023-09-07 PROCEDURE — 3078F DIAST BP <80 MM HG: CPT | Mod: CPTII,S$GLB,, | Performed by: INTERNAL MEDICINE

## 2023-09-07 PROCEDURE — 3008F BODY MASS INDEX DOCD: CPT | Mod: CPTII,S$GLB,, | Performed by: INTERNAL MEDICINE

## 2023-09-07 PROCEDURE — 1126F PR PAIN SEVERITY QUANTIFIED, NO PAIN PRESENT: ICD-10-PCS | Mod: CPTII,S$GLB,, | Performed by: INTERNAL MEDICINE

## 2023-09-07 PROCEDURE — 1160F RVW MEDS BY RX/DR IN RCRD: CPT | Mod: CPTII,S$GLB,, | Performed by: INTERNAL MEDICINE

## 2023-09-07 PROCEDURE — 1101F PT FALLS ASSESS-DOCD LE1/YR: CPT | Mod: CPTII,S$GLB,, | Performed by: INTERNAL MEDICINE

## 2023-09-07 PROCEDURE — 1101F PR PT FALLS ASSESS DOC 0-1 FALLS W/OUT INJ PAST YR: ICD-10-PCS | Mod: CPTII,S$GLB,, | Performed by: INTERNAL MEDICINE

## 2023-09-07 PROCEDURE — 1111F DSCHRG MED/CURRENT MED MERGE: CPT | Mod: CPTII,S$GLB,, | Performed by: INTERNAL MEDICINE

## 2023-09-07 PROCEDURE — 3062F PR POS MACROALBUMINURIA RESULT DOCUMENTED/REVIEW: ICD-10-PCS | Mod: CPTII,S$GLB,, | Performed by: INTERNAL MEDICINE

## 2023-09-07 PROCEDURE — 3288F FALL RISK ASSESSMENT DOCD: CPT | Mod: CPTII,S$GLB,, | Performed by: INTERNAL MEDICINE

## 2023-09-07 PROCEDURE — 99999 PR PBB SHADOW E&M-EST. PATIENT-LVL V: CPT | Mod: PBBFAC,,, | Performed by: INTERNAL MEDICINE

## 2023-09-07 PROCEDURE — 3078F PR MOST RECENT DIASTOLIC BLOOD PRESSURE < 80 MM HG: ICD-10-PCS | Mod: CPTII,S$GLB,, | Performed by: INTERNAL MEDICINE

## 2023-09-07 PROCEDURE — 1160F PR REVIEW ALL MEDS BY PRESCRIBER/CLIN PHARMACIST DOCUMENTED: ICD-10-PCS | Mod: CPTII,S$GLB,, | Performed by: INTERNAL MEDICINE

## 2023-09-07 PROCEDURE — 3288F PR FALLS RISK ASSESSMENT DOCUMENTED: ICD-10-PCS | Mod: CPTII,S$GLB,, | Performed by: INTERNAL MEDICINE

## 2023-09-07 PROCEDURE — 3066F NEPHROPATHY DOC TX: CPT | Mod: CPTII,S$GLB,, | Performed by: INTERNAL MEDICINE

## 2023-09-07 NOTE — PROGRESS NOTES
Patient received the HD flu vaccine in the right deltoid. Pt tolerated well. Pt asked to wait in the clinic 15 minutes after injection in the event of an allergic reaction. Pt verbalized understanding. Pt left in NAD.

## 2023-09-07 NOTE — PROGRESS NOTES
Subjective:      Patient ID: Ibrahima Phelps is a 67 y.o. male.    Chief Complaint:Follow-up      History of Present Illness  67-year-old male with history of CAD, afib, kidney transplant, presents for follow-up of right middle finger PIP septic arthritis. Patient presented to ED 8/29/2023 with right third digit pain. Pain started about 5 days prior to admission, progressively worsened to the point where he was unable to move his finger. He denies any trauma to the area. No exposure to dirt, water. Possibly related to insect bite. No animals at home. No outdoor activities. Does housework and intermittent projects in the garage.  Ortho performed washout 8/30/2023, cultures with no growth. Patient was initially started on ceftriaxone / daptomycin, discharged on levofloxacin and doxycycline PO.    Patient reports feeling well. He is not able to bend finger related to stitches. Denied having any adverse reactions related to antibiotics, no nausea, vomiting, abdominal pain, diarrhea.       Review of Systems   Constitutional: Negative for chills, decreased appetite, fever, malaise/fatigue, night sweats, weight gain and weight loss.   HENT:  Negative for congestion, ear pain, hearing loss, hoarse voice, sore throat and tinnitus.    Eyes:  Negative for blurred vision, redness and visual disturbance.   Cardiovascular:  Negative for chest pain, leg swelling and palpitations.   Respiratory:  Negative for cough, hemoptysis, shortness of breath, sputum production and wheezing.    Hematologic/Lymphatic: Negative for adenopathy. Does not bruise/bleed easily.   Skin:  Positive for itching. Negative for dry skin, rash and suspicious lesions.   Musculoskeletal:  Negative for back pain, joint pain, myalgias and neck pain.   Gastrointestinal:  Negative for abdominal pain, constipation, diarrhea, heartburn, nausea and vomiting.   Genitourinary:  Negative for dysuria, flank pain, frequency, hematuria, hesitancy and urgency.   Neurological:   Negative for dizziness, headaches, numbness, paresthesias and weakness.   Psychiatric/Behavioral:  Negative for depression and memory loss. The patient does not have insomnia and is not nervous/anxious.    Objective:   Physical Exam  Constitutional:       General: He is not in acute distress.     Appearance: He is well-developed. He is not diaphoretic.   HENT:      Head: Normocephalic and atraumatic.      Nose: Nose normal.   Eyes:      Conjunctiva/sclera: Conjunctivae normal.   Pulmonary:      Effort: Pulmonary effort is normal. No respiratory distress.   Abdominal:      General: Abdomen is flat. There is no distension.   Musculoskeletal:      Cervical back: Normal range of motion.      Right lower leg: No edema.      Left lower leg: No edema.   Skin:     General: Skin is warm and dry.      Findings: No erythema or rash.   Neurological:      Mental Status: He is alert.   Psychiatric:         Behavior: Behavior normal.       8/30/2023 - Cultures with no growth    8/29/2023 MR right hand  Soft tissue swelling centered about the 3rd digit with PIP joint effusion and mild flexor tendon sheath fluid.  Recommend correlation for cellulitis and infectious or inflammatory tenosynovitis.  Sterility of the 3rd PIP joint cannot be confirmed radiologically.     Dorsal subluxation of the ulnar head with associated edema like signal about the radioulnar joint and dorsal ulnar marrow like edema.  Extensor carpi ulnaris is laterally subluxed with suspected tendinosis.     Questionable widening of the scapholunate interval not well evaluated.    Assessment:   67-year-old male with history of CAD, afib, kidney transplant, presents for follow-up of right middle finger PIP septic arthritis s/p I&D 8/30/2023, cultures with no growth. Patient improving clinically on empiric levoflox and doxycycline.    Plan:   - Complete total 4 week course of levofloxacin 750 mg PO every other day, doxycycline 100 mg PO BID, last day 9/27/2023  -  Vaccines updated - will administer high-dose influenza, bivalent booster, Prevnar 20    30 minutes of total time spent on the encounter, which includes face to face time and non-face to face time preparing to see the patient (eg, review of tests), obtaining and reviewing separately obtained history, documenting clinical information in the electronic or other health record, independently interpreting results (not separately reported) and communicating results to the patient/family/caregiver, and care coordination (not separately reported).

## 2023-09-11 ENCOUNTER — TELEPHONE (OUTPATIENT)
Dept: CARDIOLOGY | Facility: HOSPITAL | Age: 68
End: 2023-09-11
Payer: MEDICARE

## 2023-09-11 ENCOUNTER — PATIENT MESSAGE (OUTPATIENT)
Dept: NEUROLOGY | Facility: CLINIC | Age: 68
End: 2023-09-11
Payer: MEDICARE

## 2023-09-12 ENCOUNTER — OFFICE VISIT (OUTPATIENT)
Dept: INTERNAL MEDICINE | Facility: CLINIC | Age: 68
End: 2023-09-12
Payer: MEDICARE

## 2023-09-12 VITALS
OXYGEN SATURATION: 99 % | HEART RATE: 60 BPM | SYSTOLIC BLOOD PRESSURE: 120 MMHG | DIASTOLIC BLOOD PRESSURE: 60 MMHG | TEMPERATURE: 98 F | HEIGHT: 73 IN | WEIGHT: 207.44 LBS | BODY MASS INDEX: 27.49 KG/M2

## 2023-09-12 DIAGNOSIS — G25.2 POSTURAL TREMOR: ICD-10-CM

## 2023-09-12 DIAGNOSIS — I10 PRIMARY HYPERTENSION: ICD-10-CM

## 2023-09-12 DIAGNOSIS — Z09 HOSPITAL DISCHARGE FOLLOW-UP: Primary | ICD-10-CM

## 2023-09-12 DIAGNOSIS — M00.9 PYOGENIC ARTHRITIS OF RIGHT HAND, DUE TO UNSPECIFIED ORGANISM: ICD-10-CM

## 2023-09-12 PROCEDURE — 99214 PR OFFICE/OUTPT VISIT, EST, LEVL IV, 30-39 MIN: ICD-10-PCS | Mod: S$GLB,,, | Performed by: STUDENT IN AN ORGANIZED HEALTH CARE EDUCATION/TRAINING PROGRAM

## 2023-09-12 PROCEDURE — 99999 PR PBB SHADOW E&M-EST. PATIENT-LVL V: CPT | Mod: PBBFAC,,, | Performed by: STUDENT IN AN ORGANIZED HEALTH CARE EDUCATION/TRAINING PROGRAM

## 2023-09-12 PROCEDURE — 99214 OFFICE O/P EST MOD 30 MIN: CPT | Mod: S$GLB,,, | Performed by: STUDENT IN AN ORGANIZED HEALTH CARE EDUCATION/TRAINING PROGRAM

## 2023-09-12 PROCEDURE — 1160F RVW MEDS BY RX/DR IN RCRD: CPT | Mod: CPTII,S$GLB,, | Performed by: STUDENT IN AN ORGANIZED HEALTH CARE EDUCATION/TRAINING PROGRAM

## 2023-09-12 PROCEDURE — 1126F PR PAIN SEVERITY QUANTIFIED, NO PAIN PRESENT: ICD-10-PCS | Mod: CPTII,S$GLB,, | Performed by: STUDENT IN AN ORGANIZED HEALTH CARE EDUCATION/TRAINING PROGRAM

## 2023-09-12 PROCEDURE — 3062F POS MACROALBUMINURIA REV: CPT | Mod: CPTII,S$GLB,, | Performed by: STUDENT IN AN ORGANIZED HEALTH CARE EDUCATION/TRAINING PROGRAM

## 2023-09-12 PROCEDURE — 3008F PR BODY MASS INDEX (BMI) DOCUMENTED: ICD-10-PCS | Mod: CPTII,S$GLB,, | Performed by: STUDENT IN AN ORGANIZED HEALTH CARE EDUCATION/TRAINING PROGRAM

## 2023-09-12 PROCEDURE — 3066F PR DOCUMENTATION OF TREATMENT FOR NEPHROPATHY: ICD-10-PCS | Mod: CPTII,S$GLB,, | Performed by: STUDENT IN AN ORGANIZED HEALTH CARE EDUCATION/TRAINING PROGRAM

## 2023-09-12 PROCEDURE — 99999 PR PBB SHADOW E&M-EST. PATIENT-LVL V: ICD-10-PCS | Mod: PBBFAC,,, | Performed by: STUDENT IN AN ORGANIZED HEALTH CARE EDUCATION/TRAINING PROGRAM

## 2023-09-12 PROCEDURE — 1111F PR DISCHARGE MEDS RECONCILED W/ CURRENT OUTPATIENT MED LIST: ICD-10-PCS | Mod: CPTII,S$GLB,, | Performed by: STUDENT IN AN ORGANIZED HEALTH CARE EDUCATION/TRAINING PROGRAM

## 2023-09-12 PROCEDURE — 1159F PR MEDICATION LIST DOCUMENTED IN MEDICAL RECORD: ICD-10-PCS | Mod: CPTII,S$GLB,, | Performed by: STUDENT IN AN ORGANIZED HEALTH CARE EDUCATION/TRAINING PROGRAM

## 2023-09-12 PROCEDURE — 3062F PR POS MACROALBUMINURIA RESULT DOCUMENTED/REVIEW: ICD-10-PCS | Mod: CPTII,S$GLB,, | Performed by: STUDENT IN AN ORGANIZED HEALTH CARE EDUCATION/TRAINING PROGRAM

## 2023-09-12 PROCEDURE — 3044F HG A1C LEVEL LT 7.0%: CPT | Mod: CPTII,S$GLB,, | Performed by: STUDENT IN AN ORGANIZED HEALTH CARE EDUCATION/TRAINING PROGRAM

## 2023-09-12 PROCEDURE — 3074F SYST BP LT 130 MM HG: CPT | Mod: CPTII,S$GLB,, | Performed by: STUDENT IN AN ORGANIZED HEALTH CARE EDUCATION/TRAINING PROGRAM

## 2023-09-12 PROCEDURE — 3044F PR MOST RECENT HEMOGLOBIN A1C LEVEL <7.0%: ICD-10-PCS | Mod: CPTII,S$GLB,, | Performed by: STUDENT IN AN ORGANIZED HEALTH CARE EDUCATION/TRAINING PROGRAM

## 2023-09-12 PROCEDURE — 3074F PR MOST RECENT SYSTOLIC BLOOD PRESSURE < 130 MM HG: ICD-10-PCS | Mod: CPTII,S$GLB,, | Performed by: STUDENT IN AN ORGANIZED HEALTH CARE EDUCATION/TRAINING PROGRAM

## 2023-09-12 PROCEDURE — 1159F MED LIST DOCD IN RCRD: CPT | Mod: CPTII,S$GLB,, | Performed by: STUDENT IN AN ORGANIZED HEALTH CARE EDUCATION/TRAINING PROGRAM

## 2023-09-12 PROCEDURE — 3078F PR MOST RECENT DIASTOLIC BLOOD PRESSURE < 80 MM HG: ICD-10-PCS | Mod: CPTII,S$GLB,, | Performed by: STUDENT IN AN ORGANIZED HEALTH CARE EDUCATION/TRAINING PROGRAM

## 2023-09-12 PROCEDURE — 3066F NEPHROPATHY DOC TX: CPT | Mod: CPTII,S$GLB,, | Performed by: STUDENT IN AN ORGANIZED HEALTH CARE EDUCATION/TRAINING PROGRAM

## 2023-09-12 PROCEDURE — 1160F PR REVIEW ALL MEDS BY PRESCRIBER/CLIN PHARMACIST DOCUMENTED: ICD-10-PCS | Mod: CPTII,S$GLB,, | Performed by: STUDENT IN AN ORGANIZED HEALTH CARE EDUCATION/TRAINING PROGRAM

## 2023-09-12 PROCEDURE — 1111F DSCHRG MED/CURRENT MED MERGE: CPT | Mod: CPTII,S$GLB,, | Performed by: STUDENT IN AN ORGANIZED HEALTH CARE EDUCATION/TRAINING PROGRAM

## 2023-09-12 PROCEDURE — 3008F BODY MASS INDEX DOCD: CPT | Mod: CPTII,S$GLB,, | Performed by: STUDENT IN AN ORGANIZED HEALTH CARE EDUCATION/TRAINING PROGRAM

## 2023-09-12 PROCEDURE — 3078F DIAST BP <80 MM HG: CPT | Mod: CPTII,S$GLB,, | Performed by: STUDENT IN AN ORGANIZED HEALTH CARE EDUCATION/TRAINING PROGRAM

## 2023-09-12 PROCEDURE — 1126F AMNT PAIN NOTED NONE PRSNT: CPT | Mod: CPTII,S$GLB,, | Performed by: STUDENT IN AN ORGANIZED HEALTH CARE EDUCATION/TRAINING PROGRAM

## 2023-09-12 NOTE — PROGRESS NOTES
SUBJECTIVE     Chief Complaint   Patient presents with    Follow-up       HPI  Ibrahima Phelps is a 67 y.o. male with   HTN c CKD3 and hx renal transplant x2 in 2002 and 2005 and on immunosuppressive meds and secondary hyperPTH, HLD, A fib, hx SVT, CAD c chronic HFpEF, COPD c baseline DAUGHERTY and tobacco use, thrombocytopenia now resolved, anemia of chronic disease, preDM, GERD, lung nodule    that presents for  hospital follow up . LOV 6/13/23.     Accompanied to clinic today by his wife.     Recent admission for pyogenic tenosynovitis of right 3rd digit, admitted for washout done by orthopedics. Treated with daptomycin/rocephin per ID recommendations. Discharged on doxycycline, levofloxacin. Has followed up with ID outpatient, recommended 4 week course of levofloxacin and doxycycline with last day being 9/27/23.     RODRIGO/DCCV for atrial fibrillation was scheduled on 7/17/23, but when he presented for the procedure he was in sinus rhythm so procedure was aborted. Continued in sinus rhythm on subsequent EKG on 7/20/23.     Patient doing well today. Tolerating antibiotics better with eating, had nausea originally. No fevers, nausea, vomiting. Has had some chills, but these have resolved. No bleeding or exudate from finger. He has a post-op appointment tomorrow.     Has complaint of worsening tremor in the RUE. Has been seen in the past by neurology. Was prescribed Klonopin, but patient states that medication was too sedating and made him confused, resolved with stopping Klonopin. He is on a beta blocker for pAFib. Wife states she was arranging a follow up appointment with neurology.     PAST MEDICAL HISTORY:  Past Medical History:   Diagnosis Date    Atrial fibrillation     CAD (coronary artery disease) 2006    MI in 2006    CHF (congestive heart failure) 2017    CKD (chronic kidney disease) stage 3, GFR 30-59 ml/min     Dr. Latif.  in transplanted kidney    COVID-19 2/7/2023    Diverticulosis     Hyperlipidemia      "Hypertension     Keloid cicatrix     Metabolic bone disease     Other emphysema 10/1/2019    Pericarditis     S/P kidney transplant 1992    x2 (1992 & 2005),    Thrombocytopenia     Thyroid disease     Tobacco use 09/05/2014       PAST SURGICAL HISTORY:  Past Surgical History:   Procedure Laterality Date    CARDIOVERSION  04/30/15    CHOLECYSTECTOMY      COLONOSCOPY  April 20, 2011    Diverticulosis, repeat recommended in 3 yrs., repeat colonoscopy 2014 revealed 2 polyps.  He should return in 5 years.    COLONOSCOPY N/A 3/13/2020    Procedure: COLONOSCOPY;  Surgeon: Chon Casper MD;  Location: [facility] (4TH FLR);  Service: Endoscopy;  Laterality: N/A;  ok to hold coumadin x5days-see telephone encounter 2/4/20-tb    COLONOSCOPY N/A 2/4/2021    Procedure: COLONOSCOPY;  Surgeon: Chon Casper MD;  Location: [facility] (4TH FLR);  Service: Endoscopy;  Laterality: N/A;  Eliquis - per Dr. GAVIN Blunt ok to hold x 2 days and "restarted asap"- ERW  last prep "poor", [id number] ordered/ Pt requests Dr. Casper  prep ins. mailed - COVID screening on 2/1/21 PC[facility]- ERW    COLONOSCOPY N/A 3/3/2021    Procedure: COLONOSCOPY;  Surgeon: Chon Casper MD;  Location: [facility] (4TH FLR);  Service: Endoscopy;  Laterality: N/A;  Patient with his second poor bowel pre and has poor prep today.  If patient not intersted or can't get colonoscopy tomorrow will need constipation bowel prep and will need to restart his Eliquis today.Thanks,Chon     per Dr Blunt-ok to hold Eliquis 2 days prior      COVID     CORONARY ANGIOPLASTY WITH STENT PLACEMENT  9/13/2006    IRRIGATION AND DEBRIDEMENT Right 8/30/2023    Procedure: IRRIGATION AND DEBRIDEMENT, RIGHT MIDDLE FINGER;  Surgeon: Martin Larsen MD;  [address]  Service: Orthopedics;  Laterality: Right;    KIDNEY TRANSPLANT  2005    PARATHYROIDECTOMY      TREATMENT OF CARDIAC ARRHYTHMIA N/A 3/28/2022    Procedure: CARDIOVERSION;  Surgeon: Migue Blunt MD;  " Location: [facility];  Service: Cardiology;  Laterality: N/A;  AF, DCC, MAC, GP, 3 PREP*RODRIGO deferred pt 100% compliant*       FAMILY HISTORY:  Family History   Problem Relation Age of Onset    No Known Problems Sister     No Known Problems Brother     Thyroid disease Mother         s/p surgery    Heart disease Father         had pacemaker    No Known Problems Sister     Kidney failure Sister     Kidney disease Sister     No Known Problems Sister     Kidney disease Brother     Kidney failure Brother         s/p transplant    Diabetes Mellitus Neg Hx     Stroke Neg Hx     Heart attack Neg Hx     Cancer Neg Hx     Celiac disease Neg Hx     Cirrhosis Neg Hx     Colon cancer Neg Hx     Esophageal cancer Neg Hx     Inflammatory bowel disease Neg Hx     Rectal cancer Neg Hx     Stomach cancer Neg Hx     Ulcerative colitis Neg Hx     Liver disease Neg Hx     Liver cancer Neg Hx     Crohn's disease Neg Hx     Melanoma Neg Hx        ALLERGIES AND MEDICATIONS: updated and reviewed.  Review of patient's allergies indicates:   Allergen Reactions    Ace inhibitors Swelling    Verapamil Other (See Comments)     Other reaction(s): Unknown    Povidone-iodine Itching     Current Outpatient Medications   Medication Sig Dispense Refill    acetaminophen (TYLENOL) 500 MG tablet Take 1 tablet (500 mg total) by mouth every 6 (six) hours as needed for Pain. 20 tablet 0    albuterol (PROVENTIL) 2.5 mg /3 mL (0.083 %) nebulizer solution Take 3 mLs (2.5 mg total) by nebulization every 6 (six) hours as needed for Wheezing. Rescue 9 mL 6    albuterol (VENTOLIN HFA) 90 mcg/actuation inhaler Inhale 2 puffs into the lungs every 6 (six) hours as needed for Wheezing or Shortness of Breath. Rescue 18 g 3    amiodarone (PACERONE) 200 MG Tab Take 1 tablet (200 mg total) by mouth once daily. 30 tablet 11    ammonium lactate 12 % Crea Apply 1 g topically once daily. 140 g 1    apixaban (ELIQUIS) 5 mg Tab Take 1 tablet (5 mg total) by mouth 2 (two) times  daily. 180 tablet 3    aspirin (ECOTRIN) 81 MG EC tablet Take 1 tablet by mouth Daily.      atorvastatin (LIPITOR) 10 MG tablet TAKE 1 TABLET BY MOUTH EVERY DAY 90 tablet 1    b complex vitamins (B COMPLEX-VITAMIN B12) tablet Take 1 tablet by mouth once daily. 90 tablet 3    calcium carbonate (CALCIUM 600 ORAL) Take by mouth. Pt taking 1200 daily      doxazosin (CARDURA) 4 MG tablet Take 1 tablet (4 mg total) by mouth every 12 (twelve) hours. 180 tablet 3    doxycycline (VIBRAMYCIN) 100 MG Cap Take 1 capsule (100 mg total) by mouth every 12 (twelve) hours. 60 capsule 0    famotidine (PEPCID) 20 MG tablet Take 1 tablet (20 mg total) by mouth 2 (two) times daily. 180 tablet 3    fluticasone propion-salmeterol 115-21 mcg/dose (ADVAIR HFA) 115-21 mcg/actuation HFAA inhaler Inhale 2 puffs into the lungs every 12 (twelve) hours. Controller 12 g 5    fluticasone propionate (FLONASE) 50 mcg/actuation nasal spray 1 spray (50 mcg total) by Each Nostril route once daily. 18.2 mL 0    furosemide (LASIX) 40 MG tablet Take 0.5 tablets (20 mg total) by mouth 2 (two) times daily. HOLD this medication on discharge per nephrology recs, and resume only once needed for lower extremity edema.      gabapentin (NEURONTIN) 100 MG capsule Take 1 capsule (100 mg total) by mouth as needed.      hydrALAZINE (APRESOLINE) 50 MG tablet TAKE 1 TABLET (50 MG TOTAL) BY MOUTH 3 (THREE) TIMES DAILY. 270 tablet 3    levoFLOXacin (LEVAQUIN) 750 MG tablet Take 1 tablet (750 mg total) by mouth every other day. 15 tablet 0    metoprolol succinate (TOPROL-XL) 25 MG 24 hr tablet Take 1 tablet (25 mg total) by mouth once daily. 90 tablet 3    MITIGARE 0.6 mg Cap TAKE 1 CAPSULE BY MOUTH TWICE A DAY AS NEEDED GOUT FLARE UP TO 3 DAYS 15 capsule 11    multivitamin (THERAGRAN) per tablet Take 1 tablet by mouth Daily.      NIFEdipine (PROCARDIA-XL) 60 MG (OSM) 24 hr tablet Take 1 tablet (60 mg total) by mouth 2 (two) times a day. 180 tablet 3    omeprazole  (PRILOSEC) 20 MG capsule Take 1 capsule (20 mg total) by mouth once daily. 14 capsule 0    oxyCODONE (ROXICODONE) 5 MG immediate release tablet Take 1 tablet (5 mg total) by mouth every 4 (four) hours as needed (pain scale 7-10). 30 tablet 0    paricalcitoL (ZEMPLAR) 1 MCG capsule TAKE 1 CAPSULE ON MONDAY, WEDNESDAY AND FRIDAY 36 capsule 3    predniSONE (DELTASONE) 5 MG tablet TAKE 1 TABLET BY MOUTH EVERY DAY IN THE MORNING 90 tablet 3    pulse oximeter (PULSE OXIMETER) device by Apply Externally route 2 (two) times a day. Use twice daily at 8 AM and 3 PM and record the value in [employer] as directed. 1 each 0    tacrolimus (PROGRAF) 1 MG Cap Take 2 capsules (2 mg total) by mouth every morning AND 2 capsules (2 mg total) every evening. 270 capsule 3    vitamin D 1000 units Tab Take 370 mg by mouth 2 (two) times daily. 2 tablets BID       No current facility-administered medications for this visit.       ROS  Review of Systems   Constitutional:  Negative for activity change and unexpected weight change.   HENT:  Negative for hearing loss, rhinorrhea and trouble swallowing.    Eyes:  Negative for discharge and visual disturbance.   Respiratory:  Negative for chest tightness and wheezing.    Cardiovascular:  Negative for chest pain and palpitations.   Gastrointestinal:  Negative for blood in stool, constipation, diarrhea and vomiting.   Endocrine: Negative for polydipsia and polyuria.   Genitourinary:  Negative for difficulty urinating, hematuria and urgency.   Musculoskeletal:  Positive for joint swelling (in 3rd finger of right hand). Negative for arthralgias and neck pain.   Neurological:  Positive for tremors and weakness (in right hand). Negative for headaches.   Psychiatric/Behavioral:  Negative for confusion and dysphoric mood.          OBJECTIVE     Physical Exam  Vitals:    09/12/23 1331   BP: 120/60   Pulse: 60   Temp: 98.1 °F (36.7 °C)    Body mass index is 27.37 kg/m².            Physical Exam  Vitals  reviewed.   Constitutional:       General: He is not in acute distress.     Appearance: Normal appearance.   HENT:      Head: Normocephalic and atraumatic.      Mouth/Throat:      Mouth: Mucous membranes are moist.      Pharynx: Oropharynx is clear.   Eyes:      Extraocular Movements: Extraocular movements intact.      Conjunctiva/sclera: Conjunctivae normal.      Pupils: Pupils are equal, round, and reactive to light.   Cardiovascular:      Rate and Rhythm: Normal rate and regular rhythm.      Pulses: Normal pulses.      Heart sounds: Normal heart sounds.   Pulmonary:      Effort: Pulmonary effort is normal.      Breath sounds: Normal breath sounds.   Abdominal:      General: There is no distension.   Musculoskeletal:         General: Normal range of motion.        Hands:       Cervical back: Normal range of motion and neck supple.   Skin:     General: Skin is warm and dry.   Neurological:      General: No focal deficit present.      Mental Status: He is alert.           Health Maintenance         Date Due Completion Date    COVID-19 Vaccine (6 - Pfizer series) 02/13/2023 10/13/2022    Colorectal Cancer Screening 03/03/2023 3/3/2021    PROSTATE-SPECIFIC ANTIGEN 01/04/2024 1/4/2023    Hemoglobin A1c (Prediabetes) 01/04/2024 1/4/2023    LDCT Lung Screen 02/22/2024 2/22/2023    High Dose Statin 09/08/2024 9/8/2023    Lipid Panel 01/04/2028 1/4/2023    TETANUS VACCINE 03/12/2028 3/12/2018              ASSESSMENT     67 y.o. male with     1. Hospital discharge follow-up    2. Pyogenic arthritis of right hand, due to unspecified organism    3. Postural tremor    4. Primary hypertension        PLAN:     1. Hospital discharge follow-up  - Discharge summary reviewed, discharge medication reconciliation reviewed with patient in clinic today.    2. Pyogenic arthritis of right hand, due to unspecified organism  - Surgical incision appears to be healing well. Tolerating Levaquin, Doxycycline. Continue.   - Continue follow up  with Orthopedics, ID.     3. Postural tremor  - Stable, but bothersome to patient. Recommend follow up with Neurology.     4. Primary hypertension  - Controlled on current regimen. Continue.       RTC in 4 months       Anatoliy Colindres MD  Family Medicine  [facility]  09/12/2023          No follow-ups on file.

## 2023-09-13 ENCOUNTER — OFFICE VISIT (OUTPATIENT)
Dept: ORTHOPEDICS | Facility: CLINIC | Age: 68
End: 2023-09-13
Payer: MEDICARE

## 2023-09-13 ENCOUNTER — HOSPITAL ENCOUNTER (OUTPATIENT)
Dept: CARDIOLOGY | Facility: HOSPITAL | Age: 68
Discharge: HOME | End: 2023-09-13
Attending: INTERNAL MEDICINE
Payer: MEDICARE

## 2023-09-13 VITALS
WEIGHT: 207 LBS | SYSTOLIC BLOOD PRESSURE: 141 MMHG | BODY MASS INDEX: 27.43 KG/M2 | HEART RATE: 58 BPM | HEIGHT: 73 IN | DIASTOLIC BLOOD PRESSURE: 69 MMHG

## 2023-09-13 DIAGNOSIS — M00.9 PYOGENIC ARTHRITIS OF RIGHT HAND, DUE TO UNSPECIFIED ORGANISM: Primary | ICD-10-CM

## 2023-09-13 DIAGNOSIS — Z98.890 POST-OPERATIVE STATE: ICD-10-CM

## 2023-09-13 DIAGNOSIS — R06.09 DOE (DYSPNEA ON EXERTION): ICD-10-CM

## 2023-09-13 LAB
CFR FLOW - ANTERIOR: 1.6
CFR FLOW - INFERIOR: 1.62
CFR FLOW - LATERAL: 1.67
CFR FLOW - MAX: 2.31
CFR FLOW - MIN: 1.06
CFR FLOW - SEPTAL: 1.45
CFR FLOW - WHOLE HEART: 1.59
CFR FLOW- DEFECT 1: 1.27
CV PHARM DOSE: 0.4 MG
CV STRESS BASE HR: 58 BPM
DIASTOLIC BLOOD PRESSURE: 69 MMHG
EJECTION FRACTION- HIGH: 59 %
END DIASTOLIC INDEX-HIGH: 155 ML/M2
END DIASTOLIC INDEX-LOW: 91 ML/M2
END SYSTOLIC INDEX-HIGH: 78 ML/M2
END SYSTOLIC INDEX-LOW: 40 ML/M2
NUC REST DIASTOLIC VOLUME INDEX: 168
NUC REST EJECTION FRACTION: 53
NUC REST SYSTOLIC VOLUME INDEX: 78
NUC STRESS DIASTOLIC VOLUME INDEX: 173
NUC STRESS EJECTION FRACTION: 56 %
NUC STRESS SYSTOLIC VOLUME INDEX: 76
OHS CV CPX 85 PERCENT MAX PREDICTED HEART RATE MALE: 129
OHS CV CPX MAX PREDICTED HEART RATE: 152
OHS CV CPX PATIENT IS FEMALE: 0
OHS CV CPX PATIENT IS MALE: 1
OHS CV CPX PEAK DIASTOLIC BLOOD PRESSURE: 55 MMHG
OHS CV CPX PEAK HEAR RATE: 65 BPM
OHS CV CPX PEAK RATE PRESSURE PRODUCT: 8255
OHS CV CPX PEAK SYSTOLIC BLOOD PRESSURE: 127 MMHG
OHS CV CPX PERCENT MAX PREDICTED HEART RATE ACHIEVED: 43
OHS CV CPX RATE PRESSURE PRODUCT PRESENTING: 8178
PERFUSION DEFECT 1 SIZE IN %: 6 %
REST FLOW - ANTERIOR: 0.76 CC/MIN/G
REST FLOW - INFERIOR: 0.63 CC/MIN/G
REST FLOW - LATERAL: 0.85 CC/MIN/G
REST FLOW - MAX: 0.98 CC/MIN/G
REST FLOW - MIN: 0.46 CC/MIN/G
REST FLOW - SEPTAL: 0.7 CC/MIN/G
REST FLOW - WHOLE HEART: 0.74 CC/MIN/G
REST FLOW- DEFECT 1: 0.56 CC/MIN/G
RETIRED EF AND QEF - SEE NOTES: 47 %
STRESS FLOW - ANTERIOR: 1.21 CC/MIN/G
STRESS FLOW - INFERIOR: 1.02 CC/MIN/G
STRESS FLOW - LATERAL: 1.41 CC/MIN/G
STRESS FLOW - MAX: 1.78 CC/MIN/G
STRESS FLOW - MIN: 0.6 CC/MIN/G
STRESS FLOW - SEPTAL: 1.02 CC/MIN/G
STRESS FLOW - WHOLE HEART: 1.17 CC/MIN/G
STRESS FLOW- DEFECT 1: 0.71 CC/MIN/G
SYSTOLIC BLOOD PRESSURE: 141 MMHG

## 2023-09-13 PROCEDURE — 63600175 PHARM REV CODE 636 W HCPCS: Performed by: INTERNAL MEDICINE

## 2023-09-13 PROCEDURE — 93018 CV STRESS TEST I&R ONLY: CPT | Mod: ,,, | Performed by: INTERNAL MEDICINE

## 2023-09-13 PROCEDURE — 99024 PR POST-OP FOLLOW-UP VISIT: ICD-10-PCS | Mod: S$GLB,,, | Performed by: NURSE PRACTITIONER

## 2023-09-13 PROCEDURE — 3062F PR POS MACROALBUMINURIA RESULT DOCUMENTED/REVIEW: ICD-10-PCS | Mod: CPTII,S$GLB,, | Performed by: NURSE PRACTITIONER

## 2023-09-13 PROCEDURE — 93016 CARDIAC PET SCAN STRESS (CUPID ONLY): ICD-10-PCS | Mod: ,,, | Performed by: INTERNAL MEDICINE

## 2023-09-13 PROCEDURE — 99999 PR PBB SHADOW E&M-EST. PATIENT-LVL III: CPT | Mod: PBBFAC,,, | Performed by: NURSE PRACTITIONER

## 2023-09-13 PROCEDURE — 78431 MYOCRD IMG PET RST&STRS CT: CPT

## 2023-09-13 PROCEDURE — 78434 AQMBF PET REST & RX STRESS: CPT

## 2023-09-13 PROCEDURE — 1159F PR MEDICATION LIST DOCUMENTED IN MEDICAL RECORD: ICD-10-PCS | Mod: CPTII,S$GLB,, | Performed by: NURSE PRACTITIONER

## 2023-09-13 PROCEDURE — 78434 AQMBF PET REST & RX STRESS: CPT | Mod: 26,,, | Performed by: INTERNAL MEDICINE

## 2023-09-13 PROCEDURE — 78431 CARDIAC PET SCAN STRESS (CUPID ONLY): ICD-10-PCS | Mod: 26,,, | Performed by: INTERNAL MEDICINE

## 2023-09-13 PROCEDURE — 3044F PR MOST RECENT HEMOGLOBIN A1C LEVEL <7.0%: ICD-10-PCS | Mod: CPTII,S$GLB,, | Performed by: NURSE PRACTITIONER

## 2023-09-13 PROCEDURE — 99024 POSTOP FOLLOW-UP VISIT: CPT | Mod: S$GLB,,, | Performed by: NURSE PRACTITIONER

## 2023-09-13 PROCEDURE — 99999 PR PBB SHADOW E&M-EST. PATIENT-LVL III: ICD-10-PCS | Mod: PBBFAC,,, | Performed by: NURSE PRACTITIONER

## 2023-09-13 PROCEDURE — 1160F PR REVIEW ALL MEDS BY PRESCRIBER/CLIN PHARMACIST DOCUMENTED: ICD-10-PCS | Mod: CPTII,S$GLB,, | Performed by: NURSE PRACTITIONER

## 2023-09-13 PROCEDURE — 3066F PR DOCUMENTATION OF TREATMENT FOR NEPHROPATHY: ICD-10-PCS | Mod: CPTII,S$GLB,, | Performed by: NURSE PRACTITIONER

## 2023-09-13 PROCEDURE — 3044F HG A1C LEVEL LT 7.0%: CPT | Mod: CPTII,S$GLB,, | Performed by: NURSE PRACTITIONER

## 2023-09-13 PROCEDURE — 78431 MYOCRD IMG PET RST&STRS CT: CPT | Mod: 26,,, | Performed by: INTERNAL MEDICINE

## 2023-09-13 PROCEDURE — 93016 CV STRESS TEST SUPVJ ONLY: CPT | Mod: ,,, | Performed by: INTERNAL MEDICINE

## 2023-09-13 PROCEDURE — 1160F RVW MEDS BY RX/DR IN RCRD: CPT | Mod: CPTII,S$GLB,, | Performed by: NURSE PRACTITIONER

## 2023-09-13 PROCEDURE — 1159F MED LIST DOCD IN RCRD: CPT | Mod: CPTII,S$GLB,, | Performed by: NURSE PRACTITIONER

## 2023-09-13 PROCEDURE — 93018 CARDIAC PET SCAN STRESS (CUPID ONLY): ICD-10-PCS | Mod: ,,, | Performed by: INTERNAL MEDICINE

## 2023-09-13 PROCEDURE — 3066F NEPHROPATHY DOC TX: CPT | Mod: CPTII,S$GLB,, | Performed by: NURSE PRACTITIONER

## 2023-09-13 PROCEDURE — 3062F POS MACROALBUMINURIA REV: CPT | Mod: CPTII,S$GLB,, | Performed by: NURSE PRACTITIONER

## 2023-09-13 PROCEDURE — 78434 CARDIAC PET SCAN STRESS (CUPID ONLY): ICD-10-PCS | Mod: 26,,, | Performed by: INTERNAL MEDICINE

## 2023-09-13 RX ORDER — AMINOPHYLLINE 25 MG/ML
75 INJECTION, SOLUTION INTRAVENOUS ONCE
Status: COMPLETED | OUTPATIENT
Start: 2023-09-13 | End: 2023-09-13

## 2023-09-13 RX ORDER — REGADENOSON 0.08 MG/ML
0.4 INJECTION, SOLUTION INTRAVENOUS
Status: COMPLETED | OUTPATIENT
Start: 2023-09-13 | End: 2023-09-13

## 2023-09-13 RX ADMIN — AMINOPHYLLINE 75 MG: 25 INJECTION, SOLUTION INTRAVENOUS at 12:09

## 2023-09-13 RX ADMIN — REGADENOSON 0.4 MG: 0.08 INJECTION, SOLUTION INTRAVENOUS at 12:09

## 2023-09-13 NOTE — PROGRESS NOTES
Mr. Phelps is here today for a post-operative visit after undergoing arthrotomy of right middle finger proximal interphalangeal joint with lavage for septic arthritis by Dr. Larsen on 8/30/2023.    Interval History:  He reports that he is doing well.  Pain is minimal.  He is not taking pain medication.  He is taking his oral antibiotics as prescribed.  He denies fever, chills, and sweats since the time of the surgery.     Physical exam:  Dressing taken down.  Incision is clean, dry and intact.  Sutures removed without difficulty.  He has tactile stimulation to their lower FA.  He can flex and extend his thumb and abduct and adduct all of his fingers.  He can perform the ok sign.  Radial pulse is 2+ and cap refill is < 3 secs.      RADS: none done today    Cultures NGTD    On Doxycycline and Levaquin per ID recommendations, estimated end date per ID 9/27/23.    Assessment:  Post-op visit (2 weeks)    Plan:    ICD-10-CM ICD-9-CM   1. Pyogenic arthritis of right hand, due to unspecified organism  M00.9 711.04   2. Post-operative state  Z98.890 V45.89     Current care, treatment plan, precautions, activity level/ modifications, limitations, rehabilitation exercises and proposed future treatment were discussed with the patient. We discussed the need to monitor for changes in symptoms and condition and report them to the physician.  Discussed importance of compliance with all appointments and follow up examinations.     WOUND CARE ORDERS  -Ibrahima was advised to keep the incision clean and dry for the next 24 hours after which he may wash the area with antibacterial soap in the shower.   -He not submerge until the incision is completely healed  -Patient was advised to monitor wound closely and multiple times daily for any problems. Call clinic immediately or report to ED for immediate medical attention for any complications including reopening of wound, drainage, purulence, redness, streaking, odor, pain out of proportion,  fever, chills, etc.   - If there are signs of infection, please call Ortho Clinic [phone] for further instructions and to make appt to be seen.       PHYSICAL THERAPY:   - HEP.  - Weight bearing as tolerated   - Range of motion as tolerated.      PAIN MEDICATION:   - Pain medication: refill was not needed  - Pain medication refill policy provided to patient for review, yes.    - Patient was informed a multi-modal approach is used to treat their pain.     DVT PROPHYLAXIS:   - Ambulatory     FOLLOW UP:   - Patient will follow up in the clinic in 4 weeks.  - X-ray is not needed.  - At time consider discharging  - Future Appointments:   Future Appointments   Date Time Provider Department Center   9/13/2023  2:30 PM CARDIAC, PET IMAGING [facility] CARDPEKARYNA [facility]   9/18/2023  3:00 PM Emre Latif MD [facility] NEPH [facility]   9/26/2023 12:40 PM Kim Bautista NP [facility] SLEEP [facility]   10/6/2023 10:15 AM EKG, APPT [facility] EKG [facility]   10/6/2023 10:40 AM BE Parmar MD [facility] ARRHYTH [facility]   10/11/2023 10:30 AM Travis Spann NP [facility] ORTHO [facility]   1/9/2024  9:00 AM Anatoliy Colindres MD [facility] IM [facility]           If there are any questions prior to scheduled follow up, the patient was instructed to contact the office

## 2023-09-18 ENCOUNTER — OFFICE VISIT (OUTPATIENT)
Dept: NEPHROLOGY | Facility: CLINIC | Age: 68
End: 2023-09-18
Payer: MEDICARE

## 2023-09-18 VITALS
WEIGHT: 207 LBS | BODY MASS INDEX: 27.43 KG/M2 | HEIGHT: 73 IN | SYSTOLIC BLOOD PRESSURE: 138 MMHG | DIASTOLIC BLOOD PRESSURE: 60 MMHG | HEART RATE: 61 BPM | OXYGEN SATURATION: 99 %

## 2023-09-18 DIAGNOSIS — I13.0 HYPERTENSIVE HEART AND RENAL DISEASE WITH RENAL FAILURE, STAGE 1 THROUGH STAGE 4 OR UNSPECIFIED CHRONIC KIDNEY DISEASE, WITH HEART FAILURE: ICD-10-CM

## 2023-09-18 DIAGNOSIS — E11.22 TYPE 2 DIABETES MELLITUS WITH STAGE 4 CHRONIC KIDNEY DISEASE, WITHOUT LONG-TERM CURRENT USE OF INSULIN: ICD-10-CM

## 2023-09-18 DIAGNOSIS — E55.9 VITAMIN D DEFICIENCY: ICD-10-CM

## 2023-09-18 DIAGNOSIS — Z94.0 KIDNEY REPLACED BY TRANSPLANT: ICD-10-CM

## 2023-09-18 DIAGNOSIS — N25.81 SECONDARY RENAL HYPERPARATHYROIDISM: ICD-10-CM

## 2023-09-18 DIAGNOSIS — I10 PRIMARY HYPERTENSION: ICD-10-CM

## 2023-09-18 DIAGNOSIS — N18.4 ANEMIA DUE TO STAGE 4 CHRONIC KIDNEY DISEASE: ICD-10-CM

## 2023-09-18 DIAGNOSIS — R53.83 FATIGUE, UNSPECIFIED TYPE: ICD-10-CM

## 2023-09-18 DIAGNOSIS — Z94.0 IMMUNOSUPPRESSIVE MANAGEMENT ENCOUNTER FOLLOWING KIDNEY TRANSPLANT: ICD-10-CM

## 2023-09-18 DIAGNOSIS — N18.4 ANEMIA IN STAGE 4 CHRONIC KIDNEY DISEASE: ICD-10-CM

## 2023-09-18 DIAGNOSIS — I48.0 PAROXYSMAL ATRIAL FIBRILLATION: ICD-10-CM

## 2023-09-18 DIAGNOSIS — R73.03 PREDIABETES: ICD-10-CM

## 2023-09-18 DIAGNOSIS — D63.1 ANEMIA IN STAGE 4 CHRONIC KIDNEY DISEASE: ICD-10-CM

## 2023-09-18 DIAGNOSIS — I50.32 CHRONIC HEART FAILURE WITH PRESERVED EJECTION FRACTION: ICD-10-CM

## 2023-09-18 DIAGNOSIS — N18.4 TYPE 2 DIABETES MELLITUS WITH STAGE 4 CHRONIC KIDNEY DISEASE, WITHOUT LONG-TERM CURRENT USE OF INSULIN: ICD-10-CM

## 2023-09-18 DIAGNOSIS — Z79.899 IMMUNOSUPPRESSIVE MANAGEMENT ENCOUNTER FOLLOWING KIDNEY TRANSPLANT: ICD-10-CM

## 2023-09-18 DIAGNOSIS — D63.1 ANEMIA DUE TO STAGE 4 CHRONIC KIDNEY DISEASE: ICD-10-CM

## 2023-09-18 DIAGNOSIS — N18.4 CKD (CHRONIC KIDNEY DISEASE) STAGE 4, GFR 15-29 ML/MIN: Primary | ICD-10-CM

## 2023-09-18 DIAGNOSIS — I50.32 CHRONIC DIASTOLIC HEART FAILURE: ICD-10-CM

## 2023-09-18 PROCEDURE — 3288F FALL RISK ASSESSMENT DOCD: CPT | Mod: CPTII,S$GLB,, | Performed by: INTERNAL MEDICINE

## 2023-09-18 PROCEDURE — 1101F PT FALLS ASSESS-DOCD LE1/YR: CPT | Mod: CPTII,S$GLB,, | Performed by: INTERNAL MEDICINE

## 2023-09-18 PROCEDURE — 3078F DIAST BP <80 MM HG: CPT | Mod: CPTII,S$GLB,, | Performed by: INTERNAL MEDICINE

## 2023-09-18 PROCEDURE — 3075F SYST BP GE 130 - 139MM HG: CPT | Mod: CPTII,S$GLB,, | Performed by: INTERNAL MEDICINE

## 2023-09-18 PROCEDURE — 99999 PR PBB SHADOW E&M-EST. PATIENT-LVL IV: CPT | Mod: PBBFAC,,, | Performed by: INTERNAL MEDICINE

## 2023-09-18 PROCEDURE — 1111F DSCHRG MED/CURRENT MED MERGE: CPT | Mod: CPTII,S$GLB,, | Performed by: INTERNAL MEDICINE

## 2023-09-18 PROCEDURE — 99215 OFFICE O/P EST HI 40 MIN: CPT | Mod: S$GLB,,, | Performed by: INTERNAL MEDICINE

## 2023-09-18 PROCEDURE — 3008F BODY MASS INDEX DOCD: CPT | Mod: CPTII,S$GLB,, | Performed by: INTERNAL MEDICINE

## 2023-09-18 PROCEDURE — 1125F AMNT PAIN NOTED PAIN PRSNT: CPT | Mod: CPTII,S$GLB,, | Performed by: INTERNAL MEDICINE

## 2023-09-18 NOTE — PROGRESS NOTES
Subjective:       Patient ID: Ibrahima Phelps is a 67 y.o. Black or  male who presents for follow-up evaluation of Chronic Kidney Disease      HPI   He is getting over an URI, has not 'gone to his chest' but now feeling better. He continues to smoke a few cigarettes a day. Off PPI for several months now and without dyspepsia. No LE edema and no SOB.  He's noticed some allograft 'twinges' but no overt pain but no problems with urination. His wife is still working at [employer] as a manager of housing for residents.     Interval history March 2019: he has three concerns:  1. Worsening tremors. Spilling corn and trouble with writing  2. Increased nocturia  3. Cold intolerance    He denies recent illness. No allogarft pain and no LUTS other than nocturia. He has unintentionally lost weight. No new medications. He is doing well off PPI. He is smoking 3 cigarettes to 3/4ppd depending on the day.     Interval history July 2019: he was hospitalized for CP and was found to be in SVT, his potassium was low in ER. He was CP free in ER after X 1 SL NTG and after SVT broke. Since then he feels well. He has lost a little bit of weight. Still smoking     Interval history Nov 2019: feels poorly--has URI and lost voice, no sick contacts. Kidney txp is managing Prograf--Cr is higher than baseline but Prograf is running high. No LUTS and no allograft pain.     Interval history April 2020:  The patient location is: Home  The chief complaint leading to consultation is: CKD and kidney transplant recioient  Visit type: audiovisual  Total time spent with patient:  28 minutes  Each patient to whom he or she provides medical services by telemedicine is:  (1) informed of the relationship between the physician and patient and the respective role of any other health care provider with respect to management of the patient; and (2) notified that he or she may decline to receive medical services by telemedicine and may withdraw from such  care at any time.  Notes: He is doing well. He stays home, his wife does the shopping and errands. He was admitted to the hospital in late March for chest pain, felt to be musculoskeletal. They started hydralazine for BP, lowered too much at home with symptoms and Cards advised to cut in half. No LE edema. No allograft pain and no LUTS. He still takes his calcium as instructed.   Interval lhistory Aug 2020:  He feels well. No hospital stays since last visit. He notes a skin lesion to right wrist, not pruritic, he noted similar spots when he was on coumadin but not as many when he was on Eliquis, it is actually improving. It  doesn't justice. He had one day of allograft tenderness but no LUTS associated with it and no recurrence. No new medications.     Interval history Jan 2021:   The patient location is: Home  The chief complaint leading to consultation is: CKD and kidney transplant   Visit type: audiovisual  Face to Face time with patient: 32 minutes  51 minutes of total time spent on the encounter, which includes face to face time and non-face to face time preparing to see the patient (eg, review of tests), Obtaining and/or reviewing separately obtained history, Documenting clinical information in the electronic or other health record, Independently interpreting results (not separately reported) and communicating results to the patient/family/caregiver, or Care coordination (not separately reported).   Each patient to whom he or she provides medical services by telemedicine is:  (1) informed of the relationship between the physician and patient and the respective role of any other health care provider with respect to management of the patient; and (2) notified that he or she may decline to receive medical services by telemedicine and may withdraw from such care at any time.  Notes: He notes BP is high. He feels well. He remains feeling cold most of the time. No new medications.     Interval history July 2021: He  is doing well. Brings a BP log with the average above goal. No CP     Interval History Jan 2022: He is feeling well. No allograft pain. No recent ER visits. Tremors about the same--saw Neuro     Interval History June 2022: He saw ENT and diagnosed with BPV, iproving. New baseline allograft function, has seen txp very closely. RLE edema. Has history of smoking, QUIT in Feb 2022     Sep 2022: He is feeling well. ER visit for gout of elbow and ER visit for SOB improved with Lasix IV dose.     Jan 2023: Herniated disc flare, needs Flexeril, Abdominal muscle cramping felt to be due to hypocalcemia, but has GI follow up. Hb dropped but no black tarry stools    May 2023:  The patient location is: [place]  The chief complaint leading to consultation is: CKD, kidney txp status   Visit type: audiovisual  60 minutes of total time spent on the encounter, which includes face to face time and non-face to face time preparing to see the patient (eg, review of tests), Obtaining and/or reviewing separately obtained history, Documenting clinical information in the electronic or other health record, Independently interpreting results (not separately reported) and communicating results to the patient/family/caregiver, or Care coordination (not separately reported).   Each patient to whom he or she provides medical services by telemedicine is:  (1) informed of the relationship between the physician and patient and the respective role of any other health care provider with respect to management of the patient; and (2) notified that he or she may decline to receive medical services by telemedicine and may withdraw from such care at any time.  Notes: Since last visit he had a prolonged hospital stay at [facility] with KATINA and there was discussion of dialysis. He was discharged home with a Hamilton catheter then developed UTI, hospitalized at [facility] for a couple of days. His only complaint to me is his shoulder hurting, legs still weak, appetite is  good. MMF is on hold.     Sep 2023: Seeing EP, saw neuro. Restarted smoking, 5-6 cigarettes,       Review of Systems   Constitutional:  Negative for activity change, appetite change, fatigue, fever and unexpected weight change.   HENT:  Negative for facial swelling.    Respiratory:  Negative for shortness of breath.    Cardiovascular:  Negative for chest pain and leg swelling.   Gastrointestinal:  Negative for abdominal pain.   Genitourinary:  Positive for frequency. Negative for decreased urine volume.   Musculoskeletal:  Positive for arthralgias and back pain.   Allergic/Immunologic: Positive for environmental allergies and immunocompromised state (kidney txp).   Neurological:  Positive for tremors.   Psychiatric/Behavioral:  Negative for confusion and decreased concentration.        Objective:      Physical Exam  Constitutional:       General: He is not in acute distress.     Appearance: He is not toxic-appearing.   Pulmonary:      Effort: Pulmonary effort is normal. No respiratory distress.   Neurological:      Mental Status: He is alert and oriented to person, place, and time.   Psychiatric:         Mood and Affect: Mood normal.       Assessment:       1. CKD (chronic kidney disease) stage 4, GFR 15-29 ml/min    2. Type 2 diabetes mellitus with stage 4 chronic kidney disease, without long-term current use of insulin    3. Anemia in stage 4 chronic kidney disease    4. Chronic heart failure with preserved ejection fraction    5. Chronic diastolic heart failure    6. Kidney replaced by transplant    7. Secondary renal hyperparathyroidism    8. Vitamin D deficiency    9. Immunosuppressive management encounter following kidney transplant    10. Primary hypertension    11. Anemia due to stage 4 chronic kidney disease    12. Paroxysmal atrial fibrillation    13. Prediabetes    14. 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 Fatigue, unspecified type              Plan:             ESRD s/p kidney transplsant with resultant CKD. Several KATINA episodes new baseline creatinine closer to 4mg/dL. No indications for dialysis,      CKD stage 4 with subnephrotic proteinuria--stable    HTN--controlled at home, monitor, on Lasix for diuretic BP control    Hyperglycemia post transplant, steroid induced/PreDiabetes--stable/improved    Mineral and Bone Disease/Secondary HPT--continue current treatment with calcium supplements, Zemplar and D3. Check D level with next labs    CHF.Diastolic Dysfunction--low sodium diet, BP and HR control. Cards follow up for afib     Anemia--refer to heme    QUIT smoking       RTC 3 months with labs prior  50 minutes of total time spent on the encounter, which includes face to face time and non-face to face time preparing to see the patient (eg, review of tests), Obtaining and/or reviewing separately obtained history, Documenting clinical information in the electronic or other health record, Independently interpreting results (not separately reported) and communicating results to the patient/family/caregiver, or Care coordination (not separately reported).

## 2023-09-20 ENCOUNTER — PATIENT MESSAGE (OUTPATIENT)
Dept: ELECTROPHYSIOLOGY | Facility: CLINIC | Age: 68
End: 2023-09-20
Payer: MEDICARE

## 2023-09-20 RX ORDER — AMIODARONE HYDROCHLORIDE 200 MG/1
200 TABLET ORAL DAILY
Qty: 30 TABLET | Refills: 11 | Status: SHIPPED | OUTPATIENT
Start: 2023-09-20 | End: 2024-09-19

## 2023-09-20 NOTE — TELEPHONE ENCOUNTER
Spoke with patient's wife regarding financial assistance with his Eliquis script. Because his insurance is [employer] he will not meet criteria for assistance  with [employer] but I hope patient assistance with [employer] could help. Will forward to hospital pharmacy for potential assistance. She also indicated that his Amiodarone script was cancelled and never got to [facility] pharmacy so we will resend to [facility] as requested. She will make every effort to pay for his Eliquis but it is getting increasing difficult to keep up with the cost of all his medications. Azucena Lowe RN

## 2023-09-21 ENCOUNTER — TELEPHONE (OUTPATIENT)
Dept: PHARMACY | Facility: CLINIC | Age: 68
End: 2023-09-21
Payer: MEDICARE

## 2023-09-21 NOTE — LETTER
September 21, 2023    Ibrahima Phelps  [address] [address]             [facility] - Pharmacy Assistance  [facility]  [place] [address]  Fax: [phone] Dear Mr. Ibrahima Phelps     It was a pleasure speaking with you. To follow up on our conversation on 9/21/2023, the Pharmacy Patient Assistance Program needs more information from you before we can submit your Eliquis application to the [facility]  Program. Please return the following documents to the Pharmacy Patient Assistance office (address, email and fax listed below) asap:      Proof of household Income( such as social security statement, 1099 form, pension statement or 3 consecutive pay stubs  Copy of all Insurance cards( front and back)  Printout from your Insurance or Pharmacy that shows how much you have spent on prescriptions this year  Signed and dated HIPAA /Patient Information Forms              Whats Next:     Once I receive your documentation and authorization from your Provider, your application will be submitted to the [facility] for review. Please be advised it will take 2 to 4 weeks for your application to be processed so you may have to purchase a month's supply of medication from your pharmacy to hold you over during the waiting period. You will be notified of approval or denial by The Program(mail) or myself.      If you have any questions or concerns, please give me a call         Sincerely   Mirta Ca    Phone# [phone]  Fax# [phone]  Email: [email or web address]

## 2023-09-21 NOTE — TELEPHONE ENCOUNTER
I have spoken with Ibrahima Phelps and informed him of the [employer]  application process for Eliquis and what's required to apply.  Ibrahima Phelps will provide the following documents: Proof of household Income( such as social security statement, 1099 form, pension statement or 3 consecutive pay stubs, Copy of all Insurance cards( front and back), Printout from your Insurance or Pharmacy that shows how much you have spent on prescriptions this year, and Signed and dated HIPAA /Patient Information Forms        I will follow up with the patient in 5 business days.

## 2023-09-25 LAB
FUNGUS SPEC CULT: NORMAL
FUNGUS SPEC CULT: NORMAL

## 2023-10-03 ENCOUNTER — PATIENT MESSAGE (OUTPATIENT)
Dept: INTERNAL MEDICINE | Facility: CLINIC | Age: 68
End: 2023-10-03
Payer: MEDICARE

## 2023-10-06 ENCOUNTER — OFFICE VISIT (OUTPATIENT)
Dept: NEUROLOGY | Facility: CLINIC | Age: 68
End: 2023-10-06
Payer: MEDICARE

## 2023-10-06 ENCOUNTER — PATIENT MESSAGE (OUTPATIENT)
Dept: ELECTROPHYSIOLOGY | Facility: CLINIC | Age: 68
End: 2023-10-06

## 2023-10-06 ENCOUNTER — HOSPITAL ENCOUNTER (OUTPATIENT)
Dept: CARDIOLOGY | Facility: CLINIC | Age: 68
Discharge: HOME | End: 2023-10-06
Payer: MEDICARE

## 2023-10-06 ENCOUNTER — OFFICE VISIT (OUTPATIENT)
Dept: ELECTROPHYSIOLOGY | Facility: CLINIC | Age: 68
End: 2023-10-06
Payer: MEDICARE

## 2023-10-06 VITALS
HEART RATE: 53 BPM | BODY MASS INDEX: 27.95 KG/M2 | HEIGHT: 73 IN | WEIGHT: 210.88 LBS | SYSTOLIC BLOOD PRESSURE: 147 MMHG | DIASTOLIC BLOOD PRESSURE: 62 MMHG

## 2023-10-06 VITALS — HEIGHT: 73 IN | BODY MASS INDEX: 27.96 KG/M2 | WEIGHT: 211 LBS | HEART RATE: 56 BPM

## 2023-10-06 DIAGNOSIS — Z79.01 LONG TERM (CURRENT) USE OF ANTICOAGULANTS: ICD-10-CM

## 2023-10-06 DIAGNOSIS — I25.10 CORONARY ARTERY DISEASE INVOLVING NATIVE CORONARY ARTERY OF NATIVE HEART WITHOUT ANGINA PECTORIS: ICD-10-CM

## 2023-10-06 DIAGNOSIS — I27.20 PULMONARY HTN: ICD-10-CM

## 2023-10-06 DIAGNOSIS — E03.9 HYPOTHYROIDISM, UNSPECIFIED TYPE: ICD-10-CM

## 2023-10-06 DIAGNOSIS — I48.0 PAROXYSMAL ATRIAL FIBRILLATION: ICD-10-CM

## 2023-10-06 DIAGNOSIS — I49.5 TACHYCARDIA-BRADYCARDIA SYNDROME: ICD-10-CM

## 2023-10-06 DIAGNOSIS — Z79.899 IMMUNODEFICIENCY DUE TO TREATMENT WITH IMMUNOSUPPRESSIVE MEDICATION: ICD-10-CM

## 2023-10-06 DIAGNOSIS — D84.821 IMMUNODEFICIENCY DUE TO TREATMENT WITH IMMUNOSUPPRESSIVE MEDICATION: ICD-10-CM

## 2023-10-06 DIAGNOSIS — Z94.0 H/O KIDNEY TRANSPLANT: ICD-10-CM

## 2023-10-06 DIAGNOSIS — I70.0 AORTIC ATHEROSCLEROSIS: ICD-10-CM

## 2023-10-06 DIAGNOSIS — E74.39 GLUCOSE INTOLERANCE: ICD-10-CM

## 2023-10-06 DIAGNOSIS — Z95.5 HISTORY OF CORONARY ANGIOPLASTY WITH INSERTION OF STENT: ICD-10-CM

## 2023-10-06 DIAGNOSIS — R06.09 DYSPNEA ON EXERTION: ICD-10-CM

## 2023-10-06 DIAGNOSIS — I10 PRIMARY HYPERTENSION: ICD-10-CM

## 2023-10-06 DIAGNOSIS — Z87.891 HISTORY OF TOBACCO USE: ICD-10-CM

## 2023-10-06 DIAGNOSIS — K21.9 GASTROESOPHAGEAL REFLUX DISEASE, UNSPECIFIED WHETHER ESOPHAGITIS PRESENT: ICD-10-CM

## 2023-10-06 DIAGNOSIS — I50.32 CHRONIC HEART FAILURE WITH PRESERVED EJECTION FRACTION: ICD-10-CM

## 2023-10-06 DIAGNOSIS — E78.2 MIXED HYPERLIPIDEMIA: ICD-10-CM

## 2023-10-06 DIAGNOSIS — G25.0 BENIGN ESSENTIAL TREMOR: Primary | ICD-10-CM

## 2023-10-06 DIAGNOSIS — I48.0 PAROXYSMAL ATRIAL FIBRILLATION: Primary | ICD-10-CM

## 2023-10-06 DIAGNOSIS — I47.10 SUPRAVENTRICULAR TACHYCARDIA: ICD-10-CM

## 2023-10-06 DIAGNOSIS — N25.81 SECONDARY RENAL HYPERPARATHYROIDISM: ICD-10-CM

## 2023-10-06 DIAGNOSIS — J44.9 CHRONIC OBSTRUCTIVE PULMONARY DISEASE, UNSPECIFIED COPD TYPE: ICD-10-CM

## 2023-10-06 DIAGNOSIS — N18.4 CKD (CHRONIC KIDNEY DISEASE), STAGE IV: ICD-10-CM

## 2023-10-06 DIAGNOSIS — R07.89 MUSCULOSKELETAL CHEST PAIN: ICD-10-CM

## 2023-10-06 DIAGNOSIS — E66.3 OVERWEIGHT (BMI 25.0-29.9): ICD-10-CM

## 2023-10-06 PROCEDURE — 1159F PR MEDICATION LIST DOCUMENTED IN MEDICAL RECORD: ICD-10-PCS | Mod: CPTII,S$GLB,, | Performed by: PSYCHIATRY & NEUROLOGY

## 2023-10-06 PROCEDURE — 3008F PR BODY MASS INDEX (BMI) DOCUMENTED: ICD-10-PCS | Mod: CPTII,S$GLB,, | Performed by: STUDENT IN AN ORGANIZED HEALTH CARE EDUCATION/TRAINING PROGRAM

## 2023-10-06 PROCEDURE — 93010 ELECTROCARDIOGRAM REPORT: CPT | Mod: S$GLB,,, | Performed by: INTERNAL MEDICINE

## 2023-10-06 PROCEDURE — 3288F FALL RISK ASSESSMENT DOCD: CPT | Mod: CPTII,S$GLB,, | Performed by: PSYCHIATRY & NEUROLOGY

## 2023-10-06 PROCEDURE — 3066F NEPHROPATHY DOC TX: CPT | Mod: CPTII,S$GLB,, | Performed by: STUDENT IN AN ORGANIZED HEALTH CARE EDUCATION/TRAINING PROGRAM

## 2023-10-06 PROCEDURE — 3066F PR DOCUMENTATION OF TREATMENT FOR NEPHROPATHY: ICD-10-PCS | Mod: CPTII,S$GLB,, | Performed by: PSYCHIATRY & NEUROLOGY

## 2023-10-06 PROCEDURE — 3062F PR POS MACROALBUMINURIA RESULT DOCUMENTED/REVIEW: ICD-10-PCS | Mod: CPTII,S$GLB,, | Performed by: STUDENT IN AN ORGANIZED HEALTH CARE EDUCATION/TRAINING PROGRAM

## 2023-10-06 PROCEDURE — 3066F NEPHROPATHY DOC TX: CPT | Mod: CPTII,S$GLB,, | Performed by: PSYCHIATRY & NEUROLOGY

## 2023-10-06 PROCEDURE — 93005 ELECTROCARDIOGRAM TRACING: CPT | Mod: S$GLB,,, | Performed by: STUDENT IN AN ORGANIZED HEALTH CARE EDUCATION/TRAINING PROGRAM

## 2023-10-06 PROCEDURE — 3062F PR POS MACROALBUMINURIA RESULT DOCUMENTED/REVIEW: ICD-10-PCS | Mod: CPTII,S$GLB,, | Performed by: PSYCHIATRY & NEUROLOGY

## 2023-10-06 PROCEDURE — 99999 PR PBB SHADOW E&M-EST. PATIENT-LVL II: ICD-10-PCS | Mod: PBBFAC,,, | Performed by: STUDENT IN AN ORGANIZED HEALTH CARE EDUCATION/TRAINING PROGRAM

## 2023-10-06 PROCEDURE — 3077F SYST BP >= 140 MM HG: CPT | Mod: CPTII,S$GLB,, | Performed by: PSYCHIATRY & NEUROLOGY

## 2023-10-06 PROCEDURE — 99999 PR PBB SHADOW E&M-EST. PATIENT-LVL II: CPT | Mod: PBBFAC,,, | Performed by: PSYCHIATRY & NEUROLOGY

## 2023-10-06 PROCEDURE — 1126F PR PAIN SEVERITY QUANTIFIED, NO PAIN PRESENT: ICD-10-PCS | Mod: CPTII,S$GLB,, | Performed by: PSYCHIATRY & NEUROLOGY

## 2023-10-06 PROCEDURE — 1159F MED LIST DOCD IN RCRD: CPT | Mod: CPTII,S$GLB,, | Performed by: PSYCHIATRY & NEUROLOGY

## 2023-10-06 PROCEDURE — 3078F PR MOST RECENT DIASTOLIC BLOOD PRESSURE < 80 MM HG: ICD-10-PCS | Mod: CPTII,S$GLB,, | Performed by: PSYCHIATRY & NEUROLOGY

## 2023-10-06 PROCEDURE — 93010 RHYTHM STRIP: ICD-10-PCS | Mod: S$GLB,,, | Performed by: INTERNAL MEDICINE

## 2023-10-06 PROCEDURE — 3044F HG A1C LEVEL LT 7.0%: CPT | Mod: CPTII,S$GLB,, | Performed by: PSYCHIATRY & NEUROLOGY

## 2023-10-06 PROCEDURE — 99214 PR OFFICE/OUTPT VISIT, EST, LEVL IV, 30-39 MIN: ICD-10-PCS | Mod: S$GLB,,, | Performed by: STUDENT IN AN ORGANIZED HEALTH CARE EDUCATION/TRAINING PROGRAM

## 2023-10-06 PROCEDURE — 99215 OFFICE O/P EST HI 40 MIN: CPT | Mod: S$GLB,,, | Performed by: PSYCHIATRY & NEUROLOGY

## 2023-10-06 PROCEDURE — 1126F PR PAIN SEVERITY QUANTIFIED, NO PAIN PRESENT: ICD-10-PCS | Mod: CPTII,S$GLB,, | Performed by: STUDENT IN AN ORGANIZED HEALTH CARE EDUCATION/TRAINING PROGRAM

## 2023-10-06 PROCEDURE — 1101F PR PT FALLS ASSESS DOC 0-1 FALLS W/OUT INJ PAST YR: ICD-10-PCS | Mod: CPTII,S$GLB,, | Performed by: PSYCHIATRY & NEUROLOGY

## 2023-10-06 PROCEDURE — 3062F POS MACROALBUMINURIA REV: CPT | Mod: CPTII,S$GLB,, | Performed by: PSYCHIATRY & NEUROLOGY

## 2023-10-06 PROCEDURE — 1101F PT FALLS ASSESS-DOCD LE1/YR: CPT | Mod: CPTII,S$GLB,, | Performed by: PSYCHIATRY & NEUROLOGY

## 2023-10-06 PROCEDURE — 1126F AMNT PAIN NOTED NONE PRSNT: CPT | Mod: CPTII,S$GLB,, | Performed by: PSYCHIATRY & NEUROLOGY

## 2023-10-06 PROCEDURE — 99214 OFFICE O/P EST MOD 30 MIN: CPT | Mod: S$GLB,,, | Performed by: STUDENT IN AN ORGANIZED HEALTH CARE EDUCATION/TRAINING PROGRAM

## 2023-10-06 PROCEDURE — 99999 PR PBB SHADOW E&M-EST. PATIENT-LVL II: ICD-10-PCS | Mod: PBBFAC,,, | Performed by: PSYCHIATRY & NEUROLOGY

## 2023-10-06 PROCEDURE — 3078F DIAST BP <80 MM HG: CPT | Mod: CPTII,S$GLB,, | Performed by: PSYCHIATRY & NEUROLOGY

## 2023-10-06 PROCEDURE — 3044F PR MOST RECENT HEMOGLOBIN A1C LEVEL <7.0%: ICD-10-PCS | Mod: CPTII,S$GLB,, | Performed by: STUDENT IN AN ORGANIZED HEALTH CARE EDUCATION/TRAINING PROGRAM

## 2023-10-06 PROCEDURE — 3008F PR BODY MASS INDEX (BMI) DOCUMENTED: ICD-10-PCS | Mod: CPTII,S$GLB,, | Performed by: PSYCHIATRY & NEUROLOGY

## 2023-10-06 PROCEDURE — 99215 PR OFFICE/OUTPT VISIT, EST, LEVL V, 40-54 MIN: ICD-10-PCS | Mod: S$GLB,,, | Performed by: PSYCHIATRY & NEUROLOGY

## 2023-10-06 PROCEDURE — 3008F BODY MASS INDEX DOCD: CPT | Mod: CPTII,S$GLB,, | Performed by: STUDENT IN AN ORGANIZED HEALTH CARE EDUCATION/TRAINING PROGRAM

## 2023-10-06 PROCEDURE — 3288F PR FALLS RISK ASSESSMENT DOCUMENTED: ICD-10-PCS | Mod: CPTII,S$GLB,, | Performed by: PSYCHIATRY & NEUROLOGY

## 2023-10-06 PROCEDURE — 1126F AMNT PAIN NOTED NONE PRSNT: CPT | Mod: CPTII,S$GLB,, | Performed by: STUDENT IN AN ORGANIZED HEALTH CARE EDUCATION/TRAINING PROGRAM

## 2023-10-06 PROCEDURE — 99999 PR PBB SHADOW E&M-EST. PATIENT-LVL II: CPT | Mod: PBBFAC,,, | Performed by: STUDENT IN AN ORGANIZED HEALTH CARE EDUCATION/TRAINING PROGRAM

## 2023-10-06 PROCEDURE — 3044F PR MOST RECENT HEMOGLOBIN A1C LEVEL <7.0%: ICD-10-PCS | Mod: CPTII,S$GLB,, | Performed by: PSYCHIATRY & NEUROLOGY

## 2023-10-06 PROCEDURE — 3066F PR DOCUMENTATION OF TREATMENT FOR NEPHROPATHY: ICD-10-PCS | Mod: CPTII,S$GLB,, | Performed by: STUDENT IN AN ORGANIZED HEALTH CARE EDUCATION/TRAINING PROGRAM

## 2023-10-06 PROCEDURE — 3062F POS MACROALBUMINURIA REV: CPT | Mod: CPTII,S$GLB,, | Performed by: STUDENT IN AN ORGANIZED HEALTH CARE EDUCATION/TRAINING PROGRAM

## 2023-10-06 PROCEDURE — 3008F BODY MASS INDEX DOCD: CPT | Mod: CPTII,S$GLB,, | Performed by: PSYCHIATRY & NEUROLOGY

## 2023-10-06 PROCEDURE — 3044F HG A1C LEVEL LT 7.0%: CPT | Mod: CPTII,S$GLB,, | Performed by: STUDENT IN AN ORGANIZED HEALTH CARE EDUCATION/TRAINING PROGRAM

## 2023-10-06 PROCEDURE — 93005 RHYTHM STRIP: ICD-10-PCS | Mod: S$GLB,,, | Performed by: STUDENT IN AN ORGANIZED HEALTH CARE EDUCATION/TRAINING PROGRAM

## 2023-10-06 PROCEDURE — 3077F PR MOST RECENT SYSTOLIC BLOOD PRESSURE >= 140 MM HG: ICD-10-PCS | Mod: CPTII,S$GLB,, | Performed by: PSYCHIATRY & NEUROLOGY

## 2023-10-06 NOTE — PROGRESS NOTES
[facility] - NEUROLOGY 7TH [place] [facility], [place]    Date: 10/6/23  Patient Name: Ibrahima Phelps   MRN: [id number]   PCP: Anatoliy Colindres  Referring Provider: No ref. provider found    Assessment:   Ibrahima Phelps is a 67 y.o. male presenting in follow-up.  Extensive review of results completed as below.  Patient now following with cardiology.  Etiology of syncopal events appears to be secondary to paroxysmal AFib.  No further neurologic workup warranted at this time.  Tremor consistent with benign essential tremor.  Patient not candidate for beta-blocker therapy due to bradycardia.  Patient wishes to avoid primidone due to side effects of medication and potential interactions.   Plan:     Problem List Items Addressed This Visit          Neuro    Benign essential tremor - Primary     I spent a total of 40 minutes preparing to see the patient, obtaining and reviewing history and results, performing a medically appropriate exam, counseling and educating the patient/family/caregiver, documenting clinical information, coordinating care, and ordering medications, tests, procedures, and referrals.      Marcos Brunson MD  [facility]   Department of Neurology    Patient note was created using MModal Dictation.  Any errors in syntax or even information may not have been identified and edited on initial review prior to signing this note.  Subjective:        HPI:   Mr. Ibrahima Phelps is a 67 y.o. male Presenting in follow up for recurrent episodes of altered awareness. Patient has undergone extensive workup including cardiac monitoring which captured two patient events with his typical prescyncopal symptoms and subjective dizziness. Both episodes were accompanied by episodes of atrial fibrillation.  He states cardiology has taken over management of this and he has had no further recurrent episodes.  Of note, the patient underwent MRI brain which revealed a remote right temporal  "punctate hemorrhage (personally reviewed).  EEG was normal.  Labs were notable for low B12 and patient is now on supplementation.  He does endorse postural tremor of his right hand that disappears at rest.  He notes it is most prominent when he is holding a cup or a spoon.  He denies family history of tremor and is uncertain if alcohol affects the tremor.    PAST MEDICAL HISTORY:  Past Medical History:   Diagnosis Date    Atrial fibrillation     CAD (coronary artery disease) 2006    MI in 2006    CHF (congestive heart failure) 2017    CKD (chronic kidney disease) stage 3, GFR 30-59 ml/min     Dr. Latif.  in transplanted kidney    COVID-19 2/7/2023    Diverticulosis     Hyperlipidemia     Hypertension     Keloid cicatrix     Metabolic bone disease     Other emphysema 10/1/2019    Pericarditis     S/P kidney transplant 1992    x2 (1992 & 2005),    Thrombocytopenia     Thyroid disease     Tobacco use 09/05/2014       PAST SURGICAL HISTORY:  Past Surgical History:   Procedure Laterality Date    CARDIOVERSION  04/30/15    CHOLECYSTECTOMY      COLONOSCOPY  April 20, 2011    Diverticulosis, repeat recommended in 3 yrs., repeat colonoscopy 2014 revealed 2 polyps.  He should return in 5 years.    COLONOSCOPY N/A 3/13/2020    Procedure: COLONOSCOPY;  Surgeon: Chon Casper MD;  Location: [facility] (4TH FLR);  Service: Endoscopy;  Laterality: N/A;  ok to hold coumadin x5days-see telephone encounter 2/4/20-tb    COLONOSCOPY N/A 2/4/2021    Procedure: COLONOSCOPY;  Surgeon: Chon Casper MD;  Location: [facility] (4TH FLR);  Service: Endoscopy;  Laterality: N/A;  Eliquis - per Dr. GAVIN Blunt ok to hold x 2 days and "restarted asap"- ERW  last prep "poor", [id number] ordered/ Pt requests Dr. Casper  prep ins. mailed - COVID screening on 2/1/21 PCW- ERW    COLONOSCOPY N/A 3/3/2021    Procedure: COLONOSCOPY;  Surgeon: Chon Casper MD;  Location: [facility] (4TH FLR);  Service: Endoscopy;  Laterality: N/A;  Patient with " his second poor bowel pre and has poor prep today.  If patient not intersted or can't get colonoscopy tomorrow will need constipation bowel prep and will need to restart his Eliquis today.Thanks,Chon     per Dr Lopez to hold Eliquis 2 days prior      COVID     CORONARY ANGIOPLASTY WITH STENT PLACEMENT  9/13/2006    IRRIGATION AND DEBRIDEMENT Right 8/30/2023    Procedure: IRRIGATION AND DEBRIDEMENT, RIGHT MIDDLE FINGER;  Surgeon: Martin Larsen MD;  [facility];  Service: Orthopedics;  Laterality: Right;    KIDNEY TRANSPLANT  2005    PARATHYROIDECTOMY      TREATMENT OF CARDIAC ARRHYTHMIA N/A 3/28/2022    Procedure: CARDIOVERSION;  Surgeon: Migue Blunt MD;  Location: [facility] EP LAB;  Service: Cardiology;  Laterality: N/A;  AF, DCC, MAC, GP, 3 PREP*RODRIGO deferred pt 100% compliant*       CURRENT MEDS:  Current Outpatient Medications   Medication Sig Dispense Refill    acetaminophen (TYLENOL) 500 MG tablet Take 1 tablet (500 mg total) by mouth every 6 (six) hours as needed for Pain. 20 tablet 0    albuterol (PROVENTIL) 2.5 mg /3 mL (0.083 %) nebulizer solution Take 3 mLs (2.5 mg total) by nebulization every 6 (six) hours as needed for Wheezing. Rescue 9 mL 6    albuterol (VENTOLIN HFA) 90 mcg/actuation inhaler Inhale 2 puffs into the lungs every 6 (six) hours as needed for Wheezing or Shortness of Breath. Rescue 18 g 3    amiodarone (PACERONE) 200 MG Tab Take 1 tablet (200 mg total) by mouth once daily. 30 tablet 11    apixaban (ELIQUIS) 5 mg Tab Take 1 tablet (5 mg total) by mouth 2 (two) times daily. 180 tablet 3    aspirin (ECOTRIN) 81 MG EC tablet Take 1 tablet by mouth Daily.      atorvastatin (LIPITOR) 10 MG tablet TAKE 1 TABLET BY MOUTH EVERY DAY 90 tablet 1    b complex vitamins (B COMPLEX-VITAMIN B12) tablet Take 1 tablet by mouth once daily. 90 tablet 3    calcium carbonate (CALCIUM 600 ORAL) Take by mouth. Pt taking 1200 daily      doxazosin (CARDURA) 4 MG tablet Take 1 tablet (4 mg  total) by mouth every 12 (twelve) hours. 180 tablet 3    famotidine (PEPCID) 20 MG tablet Take 1 tablet (20 mg total) by mouth 2 (two) times daily. 180 tablet 3    fluticasone propion-salmeterol 115-21 mcg/dose (ADVAIR HFA) 115-21 mcg/actuation HFAA inhaler Inhale 2 puffs into the lungs every 12 (twelve) hours. Controller 12 g 5    fluticasone propionate (FLONASE) 50 mcg/actuation nasal spray 1 spray (50 mcg total) by Each Nostril route once daily. 18.2 mL 0    furosemide (LASIX) 40 MG tablet Take 0.5 tablets (20 mg total) by mouth 2 (two) times daily. HOLD this medication on discharge per nephrology recs, and resume only once needed for lower extremity edema.      gabapentin (NEURONTIN) 100 MG capsule Take 1 capsule (100 mg total) by mouth as needed.      hydrALAZINE (APRESOLINE) 50 MG tablet TAKE 1 TABLET (50 MG TOTAL) BY MOUTH 3 (THREE) TIMES DAILY. 270 tablet 3    metoprolol succinate (TOPROL-XL) 25 MG 24 hr tablet Take 1 tablet (25 mg total) by mouth once daily. 90 tablet 3    MITIGARE 0.6 mg Cap TAKE 1 CAPSULE BY MOUTH TWICE A DAY AS NEEDED GOUT FLARE UP TO 3 DAYS 15 capsule 11    multivitamin (THERAGRAN) per tablet Take 1 tablet by mouth Daily.      NIFEdipine (PROCARDIA-XL) 60 MG (OSM) 24 hr tablet Take 1 tablet (60 mg total) by mouth 2 (two) times a day. 180 tablet 3    omeprazole (PRILOSEC) 20 MG capsule Take 1 capsule (20 mg total) by mouth once daily. 14 capsule 0    oxyCODONE (ROXICODONE) 5 MG immediate release tablet Take 1 tablet (5 mg total) by mouth every 4 (four) hours as needed (pain scale 7-10). 30 tablet 0    paricalcitoL (ZEMPLAR) 1 MCG capsule TAKE 1 CAPSULE ON MONDAY, WEDNESDAY AND FRIDAY 36 capsule 3    predniSONE (DELTASONE) 5 MG tablet TAKE 1 TABLET BY MOUTH EVERY DAY IN THE MORNING 90 tablet 3    tacrolimus (PROGRAF) 1 MG Cap Take 2 capsules (2 mg total) by mouth every morning AND 2 capsules (2 mg total) every evening. 270 capsule 3    vitamin D 1000 units Tab Take 370 mg by mouth 2 (two)  times daily. 2 tablets BID      ammonium lactate 12 % Crea Apply 1 g topically once daily. 140 g 1    pulse oximeter (PULSE OXIMETER) device by Apply Externally route 2 (two) times a day. Use twice daily at 8 AM and 3 PM and record the value in MyChart as directed. 1 each 0     No current facility-administered medications for this visit.       ALLERGIES:  Review of patient's allergies indicates:   Allergen Reactions    Ace inhibitors Swelling    Verapamil Other (See Comments)     Other reaction(s): Unknown    Povidone-iodine Itching       FAMILY HISTORY:  Family History   Problem Relation Age of Onset    No Known Problems Sister     No Known Problems Brother     Thyroid disease Mother         s/p surgery    Heart disease Father         had pacemaker    No Known Problems Sister     Kidney failure Sister     Kidney disease Sister     No Known Problems Sister     Kidney disease Brother     Kidney failure Brother         s/p transplant    Diabetes Mellitus Neg Hx     Stroke Neg Hx     Heart attack Neg Hx     Cancer Neg Hx     Celiac disease Neg Hx     Cirrhosis Neg Hx     Colon cancer Neg Hx     Esophageal cancer Neg Hx     Inflammatory bowel disease Neg Hx     Rectal cancer Neg Hx     Stomach cancer Neg Hx     Ulcerative colitis Neg Hx     Liver disease Neg Hx     Liver cancer Neg Hx     Crohn's disease Neg Hx     Melanoma Neg Hx        SOCIAL HISTORY:  Social History     Tobacco Use    Smoking status: Former     Current packs/day: 0.00     Average packs/day: 0.5 packs/day for 40.0 years (20.0 ttl pk-yrs)     Types: Cigarettes     Start date: 1982     Quit date: 2022     Years since quittin.6    Smokeless tobacco: Never    Tobacco comments:     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     Patient is currently retired   Substance Use Topics    Alcohol use: No    Drug use: No       Review of Systems:  12 system review of systems is negative except for the symptoms  "mentioned in HPI.      Objective:     Vitals:    10/06/23 1024   BP: (!) 147/62   Pulse: (!) 53   Weight: 95.7 kg (210 lb 13.9 oz)   Height: 6' 1" (1.854 m)     General: NAD, well nourished   Eyes: no tearing, discharge, no erythema   ENT: moist mucous membranes of the oral cavity, nares patent    Neck: Supple, full range of motion  Cardiovascular: Warm and well perfused, pulses equal and symmetrical  Lungs: Normal work of breathing, normal chest wall excursions  Skin: No rash, lesions, or breakdown on exposed skin  Psychiatry: Mood and affect are appropriate   Abdomen: soft, non tender, non distended  Extremeties: No cyanosis, clubbing or edema.    Neurological   MENTAL STATUS: Alert and oriented to person, place, and time. Attention and concentration within normal limits. Speech without dysarthria, able to name and repeat without difficulty. Recent and remote memory fair   CRANIAL NERVES: Visual fields intact. PERRL. EOMI. Facial sensation intact. Face symmetrical. Hearing grossly intact. Full shoulder shrug bilaterally. Tongue protrudes midline   SENSORY: Sensation is intact to light touch throughout.  Joint position perception intact. Negative Romberg.   MOTOR: Normal bulk and tone.  5/5 deltoid, biceps, triceps, interosseous, hand  bilaterally. 5/5 iliopsoas, knee extension/flexion, foot dorsi/plantarflexion bilaterally. Postural tremor of bilateral UEs.   REFLEXES: Symmetric and 1+ throughout.  CEREBELLAR/COORDINATION/GAIT: Gait steady w Finger to nose intact. Normal rapid alternating movements.     "

## 2023-10-06 NOTE — PROGRESS NOTES
Electrophysiology Clinic Note    Reason for follow-up patient visit: Ongoing evaluation and recommendations regarding tachycardia-bradycardia syndrome with periods of paroxysmal atrial fibrillation, s/p aborted cardioversion on 7/17/2023.    PRESENTING HISTORY:     History of Present Illness:  Mr. Ibrahima Phelps is a darshan 67-year-old gentleman who returns to clinic today for ongoing evaluation and recommendations regarding tachycardia-bradycardia syndrome with periods of paroxysmal atrial fibrillation, s/p aborted cardioversion on 7/17/2023. He has a past medical history significant for tachycardia-bradycardia syndrome, paroxysmal atrial fibrillation, heart failure with preserved ejection fraction with most recent LVEF 65%, coronary artery disease with PCI in 2006, hypertension, hyperlipidemia, aortic atherosclerosis, glucose intolerance, CKD stage IV with a history of prior renal transplant x2 in 2002 and 2005 with chronic immunosuppressive medication, secondary hyperparathyroidism, diverticulosis, COPD, GERD, hypothyroidism, gout, a history of tobacco use, and obesity. He was previously scheduled to undergo a cardioversion on 7/1//2023; however, he presented in sinus rhythm on that day. He has been maintained in asymptomatic sinus bradycardia on amiodarone 200mg po daily.      He was previously followed in EP with Dr. Blunt in regards to his atrial fibrillation, but has transitioned his care into my clinic. He was previously diagnosed with paroxysmal atrial fibrillation with RVR and underwent a successful cardioversion on 3/28/2022. He was the initiated on antiarrhythmic therapy with flecainide 50mg po BID - this dose was decreased to the 50mg po BID dosing in the setting of reported fatigue and periods of bradycardia. He has remained on oral anticoagulation with apixaban with no adverse bleeding events reported. He previously reported an episode of musculoskeletal chest pain with arm movement of the left arm  "that lasted approximately 5 seconds before spontaneously resolving. He has additionally reported episodes of "blacking out" and cognitive decline, and is being closely evaluated with neurology with Dr. Brunson. On a subsequent 14-day ambulatory event monitor, he was evidenced to have periods of frequent PVCs with an overall PVC burden of 4.6%, and multiple episodes of paroxysmal atrial fibrillation, at times with RVR. The longest event of pAF lasted 1 day and 9 hours, with an overall burden of pAF of 10%. We scheduled a cardioversion on 7/11/2023; however, this was aborted when he presented in sinus rhythm. His flecainide was discontinued, and he was started on amiodarone 200mg po daily. He reports excellent control on this agent and denies any symptoms of palpitations or racing heart rates. Of note, his resting heart rates have remained in the upper 50s to low 60s, improved from when he was on flecainide therapy.     Mr. Phelps presents to clinic today with his wife. In discussion with Mr. Phelps today, he tells me that he is feeling overall well. On ECG evaluation, he remains in asymptomatic sinus bradycardia. He denies any recent episodes of dizziness, lightheadedness, syncope, or presyncope. He has previously experienced a syncopal episode where he was noted to have fallen asleep on the couch with his wife unable to awaken him, with witnessed jerking movements. He denies any recurrent chest pain or chest discomfort, nausea or vomiting, orthopnea, or PND. He occasionally feels "skipped beats", and can tell when he is in atrial fibrillation with symptoms of palpitations and racing heart rates. He reports that occasionally he has lower extremity edema, and has remained compliant with his home lasix dose. He has not been weighing himself, but he was encouraged to frequently weigh himself in an effort to monitor his fluid status, and was recently evaluated by his nephrology team. He continues to wear compression " stockings and reports that this is helpful in preventing dependent edema. He reports baseline mild shortness of breath and dyspnea with exertion. He can climb one flight of stairs, but is limited based on hip, knee, and back osteoarthritis.     Review of Systems:  Review of Systems   Constitutional:  Positive for activity change.        Mild exercise intolerance.   HENT:  Negative for nasal congestion, nosebleeds, postnasal drip, rhinorrhea, sinus pressure/congestion, sneezing and sore throat.    Respiratory:  Positive for shortness of breath. Negative for apnea, cough, chest tightness and wheezing.    Cardiovascular:  Negative for chest pain, palpitations and leg swelling.   Gastrointestinal:  Positive for constipation. Negative for abdominal distention, abdominal pain, blood in stool, change in bowel habit, diarrhea, nausea and vomiting.   Genitourinary:  Negative for dysuria and hematuria.   Musculoskeletal:  Positive for arthralgias and back pain. Negative for gait problem.   Neurological:  Positive for syncope and memory loss. Negative for dizziness, seizures, weakness, light-headedness, headaches and coordination difficulties.        PAST HISTORY:     Past Medical History:   Diagnosis Date    Atrial fibrillation     CAD (coronary artery disease) 2006    MI in 2006    CHF (congestive heart failure) 2017    CKD (chronic kidney disease) stage 3, GFR 30-59 ml/min     Dr. Latif.  in transplanted kidney    COVID-19 2/7/2023    Diverticulosis     Hyperlipidemia     Hypertension     Keloid cicatrix     Metabolic bone disease     Other emphysema 10/1/2019    Pericarditis     S/P kidney transplant 1992    x2 (1992 & 2005),    Thrombocytopenia     Thyroid disease     Tobacco use 09/05/2014       Past Surgical History:   Procedure Laterality Date    CARDIOVERSION  04/30/15    CHOLECYSTECTOMY      COLONOSCOPY  April 20, 2011    Diverticulosis, repeat recommended in 3 yrs., repeat colonoscopy 2014 revealed 2 polyps.  He  "should return in 5 years.    COLONOSCOPY N/A 3/13/2020    Procedure: COLONOSCOPY;  Surgeon: Chon Casper MD;  Location: [facility] (4TH FLR);  Service: Endoscopy;  Laterality: N/A;  ok to hold coumadin x5days-see telephone encounter 2/4/20-tb    COLONOSCOPY N/A 2/4/2021    Procedure: COLONOSCOPY;  Surgeon: Chon Casper MD;  Location: [facility] (4TH FLR);  Service: Endoscopy;  Laterality: N/A;  Eliquis - per Dr. GAVIN Blunt ok to hold x 2 days and "restarted asap"- ERW  last prep "poor", [id number] ordered/ Pt requests Dr. Casper  prep ins. mailed - COVID screening on 2/1/21 PCW- ERW    COLONOSCOPY N/A 3/3/2021    Procedure: COLONOSCOPY;  Surgeon: Chon Casper MD;  Location: [facility] (4TH FLR);  Service: Endoscopy;  Laterality: N/A;  Patient with his second poor bowel pre and has poor prep today.  If patient not intersted or can't get colonoscopy tomorrow will need constipation bowel prep and will need to restart his Eliquis today.Thanks,Chon     per Dr Blunt-ok to hold Eliquis 2 days prior      COVID     CORONARY ANGIOPLASTY WITH STENT PLACEMENT  9/13/2006    IRRIGATION AND DEBRIDEMENT Right 8/30/2023    Procedure: IRRIGATION AND DEBRIDEMENT, RIGHT MIDDLE FINGER;  Surgeon: Martin Larsen MD;  [facility];  Service: Orthopedics;  Laterality: Right;    KIDNEY TRANSPLANT  2005    PARATHYROIDECTOMY      TREATMENT OF CARDIAC ARRHYTHMIA N/A 3/28/2022    Procedure: CARDIOVERSION;  Surgeon: Migue Blunt MD;  Location: [facility] EP LAB;  Service: Cardiology;  Laterality: N/A;  AF, DCC, MAC, GP, 3 PREP*RODRIGO deferred pt 100% compliant*       Family History:  Family History   Problem Relation Age of Onset    No Known Problems Sister     No Known Problems Brother     Thyroid disease Mother         s/p surgery    Heart disease Father         had pacemaker    No Known Problems Sister     Kidney failure Sister     Kidney disease Sister     No Known Problems Sister     Kidney disease Brother     " Kidney failure Brother         s/p transplant    Diabetes Mellitus Neg Hx     Stroke Neg Hx     Heart attack Neg Hx     Cancer Neg Hx     Celiac disease Neg Hx     Cirrhosis Neg Hx     Colon cancer Neg Hx     Esophageal cancer Neg Hx     Inflammatory bowel disease Neg Hx     Rectal cancer Neg Hx     Stomach cancer Neg Hx     Ulcerative colitis Neg Hx     Liver disease Neg Hx     Liver cancer Neg Hx     Crohn's disease Neg Hx     Melanoma Neg Hx        Social History:  He  reports that he quit smoking about 19 months ago. His smoking use included cigarettes. He started smoking about 41 years ago. He has a 20.0 pack-year smoking history. He has never used smokeless tobacco. He reports that he does not drink alcohol and does not use drugs.      MEDICATIONS & ALLERGIES:     Review of patient's allergies indicates:   Allergen Reactions    Ace inhibitors Swelling    Verapamil Other (See Comments)     Other reaction(s): Unknown    Povidone-iodine Itching       Current Outpatient Medications on File Prior to Visit   Medication Sig Dispense Refill    acetaminophen (TYLENOL) 500 MG tablet Take 1 tablet (500 mg total) by mouth every 6 (six) hours as needed for Pain. 20 tablet 0    albuterol (PROVENTIL) 2.5 mg /3 mL (0.083 %) nebulizer solution Take 3 mLs (2.5 mg total) by nebulization every 6 (six) hours as needed for Wheezing. Rescue 9 mL 6    albuterol (VENTOLIN HFA) 90 mcg/actuation inhaler Inhale 2 puffs into the lungs every 6 (six) hours as needed for Wheezing or Shortness of Breath. Rescue 18 g 3    amiodarone (PACERONE) 200 MG Tab Take 1 tablet (200 mg total) by mouth once daily. 30 tablet 11    ammonium lactate 12 % Crea Apply 1 g topically once daily. 140 g 1    apixaban (ELIQUIS) 5 mg Tab Take 1 tablet (5 mg total) by mouth 2 (two) times daily. 180 tablet 3    aspirin (ECOTRIN) 81 MG EC tablet Take 1 tablet by mouth Daily.      atorvastatin (LIPITOR) 10 MG tablet TAKE 1 TABLET BY MOUTH EVERY DAY 90 tablet 1    b  complex vitamins (B COMPLEX-VITAMIN B12) tablet Take 1 tablet by mouth once daily. 90 tablet 3    calcium carbonate (CALCIUM 600 ORAL) Take by mouth. Pt taking 1200 daily      doxazosin (CARDURA) 4 MG tablet Take 1 tablet (4 mg total) by mouth every 12 (twelve) hours. 180 tablet 3    famotidine (PEPCID) 20 MG tablet Take 1 tablet (20 mg total) by mouth 2 (two) times daily. 180 tablet 3    fluticasone propion-salmeterol 115-21 mcg/dose (ADVAIR HFA) 115-21 mcg/actuation HFAA inhaler Inhale 2 puffs into the lungs every 12 (twelve) hours. Controller 12 g 5    fluticasone propionate (FLONASE) 50 mcg/actuation nasal spray 1 spray (50 mcg total) by Each Nostril route once daily. 18.2 mL 0    furosemide (LASIX) 40 MG tablet Take 0.5 tablets (20 mg total) by mouth 2 (two) times daily. HOLD this medication on discharge per nephrology recs, and resume only once needed for lower extremity edema. (Patient not taking: Reported on 9/18/2023)      gabapentin (NEURONTIN) 100 MG capsule Take 1 capsule (100 mg total) by mouth as needed.      hydrALAZINE (APRESOLINE) 50 MG tablet TAKE 1 TABLET (50 MG TOTAL) BY MOUTH 3 (THREE) TIMES DAILY. 270 tablet 3    metoprolol succinate (TOPROL-XL) 25 MG 24 hr tablet Take 1 tablet (25 mg total) by mouth once daily. 90 tablet 3    MITIGARE 0.6 mg Cap TAKE 1 CAPSULE BY MOUTH TWICE A DAY AS NEEDED GOUT FLARE UP TO 3 DAYS 15 capsule 11    multivitamin (THERAGRAN) per tablet Take 1 tablet by mouth Daily.      NIFEdipine (PROCARDIA-XL) 60 MG (OSM) 24 hr tablet Take 1 tablet (60 mg total) by mouth 2 (two) times a day. 180 tablet 3    omeprazole (PRILOSEC) 20 MG capsule Take 1 capsule (20 mg total) by mouth once daily. 14 capsule 0    oxyCODONE (ROXICODONE) 5 MG immediate release tablet Take 1 tablet (5 mg total) by mouth every 4 (four) hours as needed (pain scale 7-10). 30 tablet 0    paricalcitoL (ZEMPLAR) 1 MCG capsule TAKE 1 CAPSULE ON MONDAY, WEDNESDAY AND FRIDAY 36 capsule 3    predniSONE  "(DELTASONE) 5 MG tablet TAKE 1 TABLET BY MOUTH EVERY DAY IN THE MORNING 90 tablet 3    pulse oximeter (PULSE OXIMETER) device by Apply Externally route 2 (two) times a day. Use twice daily at 8 AM and 3 PM and record the value in [email or web address] as directed. 1 each 0    tacrolimus (PROGRAF) 1 MG Cap Take 2 capsules (2 mg total) by mouth every morning AND 2 capsules (2 mg total) every evening. 270 capsule 3    vitamin D 1000 units Tab Take 370 mg by mouth 2 (two) times daily. 2 tablets BID       No current facility-administered medications on file prior to visit.        OBJECTIVE:     Vital Signs:  Pulse (!) 56   Ht 6' 1" (1.854 m)   Wt 95.7 kg (210 lb 15.7 oz)   BMI 27.84 kg/m²     Physical Exam:  Physical Exam  Constitutional:       General: He is not in acute distress.     Appearance: Normal appearance. He is not ill-appearing or diaphoretic.      Comments: Well-appearing man in NAD.   HENT:      Head: Normocephalic and atraumatic.      Nose: Nose normal.      Mouth/Throat:      Mouth: Mucous membranes are moist.      Pharynx: Oropharynx is clear.   Eyes:      Pupils: Pupils are equal, round, and reactive to light.   Cardiovascular:      Rate and Rhythm: Regular rhythm. Bradycardia present.      Pulses: Normal pulses.      Heart sounds: Normal heart sounds. No murmur heard.     No friction rub. No gallop.   Pulmonary:      Effort: Pulmonary effort is normal. No respiratory distress.      Breath sounds: Normal breath sounds. No wheezing, rhonchi or rales.   Chest:      Chest wall: No tenderness.   Abdominal:      General: There is no distension.      Palpations: Abdomen is soft.      Tenderness: There is no abdominal tenderness.   Musculoskeletal:         General: No swelling or tenderness.      Cervical back: Normal range of motion.      Right lower leg: Edema present.      Left lower leg: Edema present.      Comments: Trace bilateral pedal edema, wearing compression stockings in clinic.    Skin:     General: Skin is " warm and dry.      Findings: No erythema, lesion or rash.   Neurological:      General: No focal deficit present.      Mental Status: He is alert and oriented to person, place, and time. Mental status is at baseline.      Motor: No weakness.      Gait: Gait normal.   Psychiatric:         Mood and Affect: Mood normal.         Behavior: Behavior normal.        Laboratory Data:  Lab Results   Component Value Date    WBC 7.17 09/01/2023    HGB 8.5 (L) 09/01/2023    HCT 27.5 (L) 09/01/2023    MCV 79 (L) 09/01/2023     09/01/2023     Lab Results   Component Value Date    GLU 91 09/13/2023     09/13/2023    K 4.5 09/13/2023     (H) 09/13/2023    CO2 20 (L) 09/13/2023    BUN 46 (H) 09/13/2023    CREATININE 4.1 (H) 09/13/2023    CALCIUM 8.6 (L) 09/13/2023    MG 1.8 09/13/2023     Lab Results   Component Value Date    INR 1.1 07/13/2023    INR 1.2 02/07/2023    INR 1.1 03/21/2022       Pertinent Cardiac Data:  ECG: Sinus bradycardia with rate of 56 bpm,  ms, LVH with repolarization changes  ms, QT/QTc 466/449 ms, nonspecific ST changes.     Resting 2D Transthoracic Echocardiogram - 3/23/2020:  Concentric left ventricular remodeling.  Normal left ventricular systolic function. The estimated ejection fraction is 60%.  Normal right ventricular systolic function.  Normal LV diastolic function.  Aortic sclerosis without significant stenosis.  Intermediate central venous pressure (8 mmHg).    Pharmacologic Nuclear Cardiac Stress Test - SPECT - 6/29/2020:    The perfusion scan is free of evidence from myocardial ischemia or injury.    Gated perfusion images showed an ejection fraction of 63% at rest and 67% post stress. Normal ejection fraction is greater than 51%.    There is normal wall motion at rest and post stress.    LV cavity size is normal at rest and normal at stress.    The EKG portion of this study is abnormal but not diagnostic.    The patient reported no chest pain during the stress  test.    Arrhythmias during stress: rare PACs, occasional PVCs.    There are no prior studies for comparison.    Resting 2D Transthoracic Echocardiogram - 3/21/2022:  Moderate left atrial enlargement.  The left ventricle is normal in size with concentric remodeling and normal systolic function.  The estimated ejection fraction is 55%.  There are segmental left ventricular wall motion abnormalities : akinetic basal inferior wall.  Indeterminate left ventricular diastolic function.  Mild right atrial enlargement.  Normal right ventricular size with normal right ventricular systolic function.  There is mild aortic valve stenosis.  Aortic valve area is 1.84 cm2; peak velocity is 2.60 m/s; mean gradient is 13 mmHg.  Mild aortic regurgitation.  Mild pulmonic regurgitation.  The estimated PA systolic pressure is 25 mmHg.  Normal central venous pressure (3 mmHg).    Pharmacologic Nuclear Cardiac Stress Test - SPECT - 4/28/2022:    Normal myocardial perfusion scan. There is no evidence of myocardial ischemia or infarction.    The visually estimated ejection fraction is normal at rest and normal during stress.    There is normal wall motion at rest and post stress.    LV cavity size is normal at rest and normal at stress.    The EKG portion of this study is abnormal but not diagnostic.    The patient reported no chest pain during the stress test.    There were no arrhythmias during stress.    When compared to the previous study from 6/29/2020, there are no significant changes.    Resting 2D Transthoracic Echocardiogram - 2/10/2023:  The left ventricle is normal in size with normal systolic function. The estimated ejection fraction is 65%.  Normal right ventricular size with normal right ventricular systolic function.  Grade II left ventricular diastolic dysfunction.  Severe left atrial enlargement.  There is mild aortic valve stenosis. Aortic valve area is 1.8 cm2; peak velocity is 2.9 m/s; mean gradient is 15  mmHg.  Pulmonary HTN - the estimated PA systolic pressure is > 41mm Hg. (The IVC was not well visualized.)    14-Day Ambulatory Event Monitor - 6/23/2023:  Baseline rhythm was normal sinus rhythm with normal intervals and an average heart rate of 65 bpm.  There were two patient-triggered episodes with reported symptoms of dizziness, lightheadedness, palpitations, shortness of breath, and heart fluttering. These episodes corresponded to a period of normal sinus rhythm with PACs and an episode of atrial fibrillation with RVR.  There were occasional PVCs with an overall PVC burden of 4.6% (67.7K). There were rare runs of nonsustained ventricular tachycardia with the longest run lasting 4 beats.  There were frequent PACs with an overall PAC burden of 11.5%. There were occasional runs of nonsustained atrial tachycardia with the longest run lasting 4 beats.  There was no evidence of high-degree AV block, nor were there prolonged pauses.  There were multiple episodes of paroxysmal atrial fibrillation, at times with RVR. The longest event of pAF lasted 1 day and 9 hours, with an overall burden of pAF of 10%.  There were no sustained ventricular arrhythmias.    Pharmacologic Nuclear Cardiac Stress Test - PET - 9/13/2023:    There is a small sized, mild intensity apical inferior and inferoseptal reversible perfusion abnormality involving 6% of LV myocardium indicative of focal coronary stenosis.    Within the perfusion abnormality, absolute myocardial perfusion (cc/min/gm) averaged 0.56 cc/min/g at rest, 0.71 cc/min/g at stress and CFR was 1.27 , which equates to severely reduced coronary flow capacity within the LAD territory.    There are no other significant perfusion abnormalities.    The whole heart absolute myocardial perfusion values averaged 0.74 cc/min/g at rest, which is normal; 1.17 cc/min/g at stress, which is mildly reduced; and CFR is 1.59 , which is mildly reduced.    CT attenuation images demonstrate severe  diffuse coronary calcifications in the LAD, and moderate diffuse coronary calcifications in the LCX and RCA territory and mild diffuse aortic calcifications in the descending aorta.    The gated perfusion images showed an ejection fraction of 53% at rest and 56% during stress. A normal ejection fraction is greater than 47%.    The wall motion is normal at rest and during stress.    The LV cavity size is mildly enlarged at rest and stress.    The ECG portion of the study is abnormal but not diagnostic due to resting ST-T abnormalities.    There were no arrhythmias during stress.    The patient reported chest pain during the stress test.    There are no prior studies for comparison.      ASSESSMENT & PLAN:   Mr. Ibrahima Phelps is a darshan 67-year-old gentleman who returns to clinic today for ongoing evaluation and recommendations regarding tachycardia-bradycardia syndrome with periods of paroxysmal atrial fibrillation, s/p aborted cardioversion on 7/17/2023. He has a past medical history significant for tachycardia-bradycardia syndrome, paroxysmal atrial fibrillation, heart failure with preserved ejection fraction with most recent LVEF 65%, coronary artery disease with PCI in 2006, hypertension, hyperlipidemia, aortic atherosclerosis, glucose intolerance, CKD stage IV with a history of prior renal transplant x2 in 2002 and 2005 with chronic immunosuppressive medication, secondary hyperparathyroidism, diverticulosis, COPD, GERD, hypothyroidism, gout, a history of tobacco use, and obesity. He was previously scheduled to undergo a cardioversion on 7/1//2023; however, he presented in sinus rhythm on that day. He has been maintained in asymptomatic sinus bradycardia on amiodarone 200mg po daily.      - We continue to discuss the pathophysiology of atrial fibrillation; specifically, we discussed paroxysmal atrial fibrillation and the concept that some patients may experience paroxysms of atrial fibrillation interrupting  periods of sinus rhythm. We reviewed that there is an increased risk of CVA with atrial fibrillation.  - He remains in sinus rhythm on assessment today and reports that since starting amiodarone therapy, he has experienced less frequent episodes of atrial fibrillation. We will continue amiodarone 200mg po daily.   - He remains on metoprolol succinate 12.5mg po daily. This may need to be reduced in the event his resting heart rates decrease.   - His [id number] is 4 (HFpEF, HTN, aortic atherosclerosis, male gender, age 65-74), portending an annual adjusted risk of CVA of 4%. He remains on uninterrupted apixaban therapy with no bleeding events reported.   - We discussed that in some patients, a pacemaker may be required in order to increase antiarrhythmic therapy without causing symptomatic bradycardia. He denies any symptoms while in sinus bradycardia today.  - We discussed the possibility of catheter ablation with pulmonary vein isolation should he continue to have symptoms and AF despite AAD therapy. He voices understanding, although he would like to remain on rhythm control with medication at this time.   - Possible underlying drivers of atrial fibrillation were addressed at this appointment, including recommendations for weight loss - now a class I recommendation. Review of laboratory data revealed stable and acceptable TSH at 1.494. A request for a sleep study was deferred by the patient today.    - He will continue to follow with his neurologist, nephrologist, PCP, and general cardiology team for ongoing evaluation and recommendations regarding his chronic comorbid conditions.      This patient will return to clinic with me in six months with continued amiodarone and uninterrupted oral anticoagulation with apixaban. All questions and concerns were addressed at this encounter.     Signing Physician:       CHARLEY Parmar MD  Electrophysiology Attending

## 2023-10-08 PROBLEM — J44.9 CHRONIC OBSTRUCTIVE PULMONARY DISEASE: Status: ACTIVE | Noted: 2019-10-01

## 2023-10-08 PROBLEM — E74.39 GLUCOSE INTOLERANCE: Status: ACTIVE | Noted: 2023-10-08

## 2023-10-11 ENCOUNTER — TELEPHONE (OUTPATIENT)
Dept: ORTHOPEDICS | Facility: CLINIC | Age: 68
End: 2023-10-11

## 2023-10-11 DIAGNOSIS — M50.30 DDD (DEGENERATIVE DISC DISEASE), CERVICAL: Primary | ICD-10-CM

## 2023-10-11 NOTE — TELEPHONE ENCOUNTER
Called and informed pt's wife his appt with Travis today has been cancelled. Pt's wife verbally understood and says pt is not home at the moment and will contact the office when he gets back in to rescheduled his appt.

## 2023-10-13 ENCOUNTER — HOSPITAL ENCOUNTER (INPATIENT)
Facility: HOSPITAL | Age: 68
LOS: 7 days | Discharge: HOME | DRG: 302 | End: 2023-10-20
Attending: STUDENT IN AN ORGANIZED HEALTH CARE EDUCATION/TRAINING PROGRAM | Admitting: STUDENT IN AN ORGANIZED HEALTH CARE EDUCATION/TRAINING PROGRAM
Payer: MEDICARE

## 2023-10-13 DIAGNOSIS — E83.51 HYPOCALCEMIA: ICD-10-CM

## 2023-10-13 DIAGNOSIS — D84.821 IMMUNODEFICIENCY DUE TO TREATMENT WITH IMMUNOSUPPRESSIVE MEDICATION: Chronic | ICD-10-CM

## 2023-10-13 DIAGNOSIS — Z51.5 PALLIATIVE CARE ENCOUNTER: ICD-10-CM

## 2023-10-13 DIAGNOSIS — D63.1 ANEMIA IN STAGE 4 CHRONIC KIDNEY DISEASE: Chronic | ICD-10-CM

## 2023-10-13 DIAGNOSIS — R07.9 CHEST PAIN: ICD-10-CM

## 2023-10-13 DIAGNOSIS — Z94.0 KIDNEY REPLACED BY TRANSPLANT: ICD-10-CM

## 2023-10-13 DIAGNOSIS — N18.4 ANEMIA IN STAGE 4 CHRONIC KIDNEY DISEASE: Chronic | ICD-10-CM

## 2023-10-13 DIAGNOSIS — N18.4 CKD (CHRONIC KIDNEY DISEASE), STAGE IV: ICD-10-CM

## 2023-10-13 DIAGNOSIS — I20.0 UNSTABLE ANGINA PECTORIS: ICD-10-CM

## 2023-10-13 DIAGNOSIS — Z71.89 ADVANCED CARE PLANNING/COUNSELING DISCUSSION: ICD-10-CM

## 2023-10-13 DIAGNOSIS — E74.39 GLUCOSE INTOLERANCE: ICD-10-CM

## 2023-10-13 DIAGNOSIS — I50.33 ACUTE ON CHRONIC HEART FAILURE WITH PRESERVED EJECTION FRACTION: ICD-10-CM

## 2023-10-13 DIAGNOSIS — I50.30 (HFPEF) HEART FAILURE WITH PRESERVED EJECTION FRACTION: ICD-10-CM

## 2023-10-13 DIAGNOSIS — Z79.899 IMMUNODEFICIENCY DUE TO TREATMENT WITH IMMUNOSUPPRESSIVE MEDICATION: Chronic | ICD-10-CM

## 2023-10-13 DIAGNOSIS — Z94.0 H/O KIDNEY TRANSPLANT: Chronic | ICD-10-CM

## 2023-10-13 DIAGNOSIS — Z71.89 GOALS OF CARE, COUNSELING/DISCUSSION: ICD-10-CM

## 2023-10-13 DIAGNOSIS — R06.02 SOB (SHORTNESS OF BREATH): ICD-10-CM

## 2023-10-13 DIAGNOSIS — J18.1 RIGHT LOWER LOBE CONSOLIDATION: Primary | ICD-10-CM

## 2023-10-13 DIAGNOSIS — M79.673 HEEL PAIN: ICD-10-CM

## 2023-10-13 PROBLEM — I48.0 PAROXYSMAL ATRIAL FIBRILLATION: Chronic | Status: ACTIVE | Noted: 2017-09-25

## 2023-10-13 PROBLEM — T45.1X5A IMMUNODEFICIENCY DUE TO TREATMENT WITH IMMUNOSUPPRESSIVE MEDICATION: Chronic | Status: ACTIVE | Noted: 2023-03-17

## 2023-10-13 PROBLEM — I77.9 CAROTID ARTERY DISEASE: Chronic | Status: ACTIVE | Noted: 2021-03-23

## 2023-10-13 PROBLEM — G25.0 BENIGN ESSENTIAL TREMOR: Chronic | Status: ACTIVE | Noted: 2023-10-06

## 2023-10-13 PROBLEM — Z79.60 IMMUNODEFICIENCY DUE TO TREATMENT WITH IMMUNOSUPPRESSIVE MEDICATION: Chronic | Status: ACTIVE | Noted: 2023-03-17

## 2023-10-13 PROBLEM — J44.9 CHRONIC OBSTRUCTIVE PULMONARY DISEASE: Chronic | Status: ACTIVE | Noted: 2019-10-01

## 2023-10-13 PROBLEM — I27.20 PULMONARY HTN: Chronic | Status: ACTIVE | Noted: 2023-02-10

## 2023-10-13 PROBLEM — I50.32 CHRONIC HEART FAILURE WITH PRESERVED EJECTION FRACTION: Chronic | Status: ACTIVE | Noted: 2023-03-17

## 2023-10-13 LAB
ALBUMIN SERPL BCP-MCNC: 2.9 G/DL (ref 3.5–5.2)
ALP SERPL-CCNC: 66 U/L (ref 55–135)
ALT SERPL W/O P-5'-P-CCNC: 18 U/L (ref 10–44)
ANION GAP SERPL CALC-SCNC: 9 MMOL/L (ref 8–16)
AST SERPL-CCNC: 22 U/L (ref 10–40)
BACTERIA #/AREA URNS AUTO: ABNORMAL /HPF
BASOPHILS # BLD AUTO: 0.03 K/UL (ref 0–0.2)
BASOPHILS NFR BLD: 0.4 % (ref 0–1.9)
BILIRUB SERPL-MCNC: 0.3 MG/DL (ref 0.1–1)
BILIRUB UR QL STRIP: NEGATIVE
BNP SERPL-MCNC: 1168 PG/ML (ref 0–99)
BUN SERPL-MCNC: 33 MG/DL (ref 8–23)
CALCIUM SERPL-MCNC: 8.2 MG/DL (ref 8.7–10.5)
CHLORIDE SERPL-SCNC: 111 MMOL/L (ref 95–110)
CLARITY UR REFRACT.AUTO: CLEAR
CO2 SERPL-SCNC: 19 MMOL/L (ref 23–29)
COLOR UR AUTO: YELLOW
CREAT SERPL-MCNC: 4.1 MG/DL (ref 0.5–1.4)
DIFFERENTIAL METHOD: ABNORMAL
EOSINOPHIL # BLD AUTO: 0.5 K/UL (ref 0–0.5)
EOSINOPHIL NFR BLD: 6 % (ref 0–8)
ERYTHROCYTE [DISTWIDTH] IN BLOOD BY AUTOMATED COUNT: 18.6 % (ref 11.5–14.5)
EST. GFR  (NO RACE VARIABLE): 15.1 ML/MIN/1.73 M^2
GLUCOSE SERPL-MCNC: 97 MG/DL (ref 70–110)
GLUCOSE UR QL STRIP: NEGATIVE
HCT VFR BLD AUTO: 26.7 % (ref 40–54)
HGB BLD-MCNC: 8.2 G/DL (ref 14–18)
HGB UR QL STRIP: NEGATIVE
HYALINE CASTS UR QL AUTO: 2 /LPF
IMM GRANULOCYTES # BLD AUTO: 0.03 K/UL (ref 0–0.04)
IMM GRANULOCYTES NFR BLD AUTO: 0.4 % (ref 0–0.5)
KETONES UR QL STRIP: NEGATIVE
LACTATE SERPL-SCNC: 1.1 MMOL/L (ref 0.5–2.2)
LEUKOCYTE ESTERASE UR QL STRIP: NEGATIVE
LYMPHOCYTES # BLD AUTO: 1.7 K/UL (ref 1–4.8)
LYMPHOCYTES NFR BLD: 19.9 % (ref 18–48)
MAGNESIUM SERPL-MCNC: 2 MG/DL (ref 1.6–2.6)
MCH RBC QN AUTO: 25.1 PG (ref 27–31)
MCHC RBC AUTO-ENTMCNC: 30.7 G/DL (ref 32–36)
MCV RBC AUTO: 82 FL (ref 82–98)
MICROSCOPIC COMMENT: ABNORMAL
MONOCYTES # BLD AUTO: 0.7 K/UL (ref 0.3–1)
MONOCYTES NFR BLD: 8.7 % (ref 4–15)
NEUTROPHILS # BLD AUTO: 5.5 K/UL (ref 1.8–7.7)
NEUTROPHILS NFR BLD: 64.6 % (ref 38–73)
NITRITE UR QL STRIP: NEGATIVE
NON-SQ EPI CELLS #/AREA URNS AUTO: 0 /HPF
NRBC BLD-RTO: 0 /100 WBC
PH UR STRIP: 6 [PH] (ref 5–8)
PLATELET # BLD AUTO: 127 K/UL (ref 150–450)
PMV BLD AUTO: ABNORMAL FL (ref 9.2–12.9)
POTASSIUM SERPL-SCNC: 4.1 MMOL/L (ref 3.5–5.1)
PROCALCITONIN SERPL IA-MCNC: 0.21 NG/ML
PROT SERPL-MCNC: 6.5 G/DL (ref 6–8.4)
PROT UR QL STRIP: ABNORMAL
RBC # BLD AUTO: 3.27 M/UL (ref 4.6–6.2)
RBC #/AREA URNS AUTO: 2 /HPF (ref 0–4)
SODIUM SERPL-SCNC: 139 MMOL/L (ref 136–145)
SP GR UR STRIP: 1.01 (ref 1–1.03)
SQUAMOUS #/AREA URNS AUTO: 0 /HPF
TROPONIN I SERPL DL<=0.01 NG/ML-MCNC: 0.06 NG/ML (ref 0–0.03)
TROPONIN I SERPL DL<=0.01 NG/ML-MCNC: 0.08 NG/ML (ref 0–0.03)
URN SPEC COLLECT METH UR: ABNORMAL
WBC # BLD AUTO: 8.55 K/UL (ref 3.9–12.7)
WBC #/AREA URNS AUTO: 5 /HPF (ref 0–5)

## 2023-10-13 PROCEDURE — 93005 ELECTROCARDIOGRAM TRACING: CPT

## 2023-10-13 PROCEDURE — 63600175 PHARM REV CODE 636 W HCPCS

## 2023-10-13 PROCEDURE — 87040 BLOOD CULTURE FOR BACTERIA: CPT | Performed by: STUDENT IN AN ORGANIZED HEALTH CARE EDUCATION/TRAINING PROGRAM

## 2023-10-13 PROCEDURE — 25000242 PHARM REV CODE 250 ALT 637 W/ HCPCS

## 2023-10-13 PROCEDURE — 83880 ASSAY OF NATRIURETIC PEPTIDE: CPT

## 2023-10-13 PROCEDURE — 94761 N-INVAS EAR/PLS OXIMETRY MLT: CPT

## 2023-10-13 PROCEDURE — 83735 ASSAY OF MAGNESIUM: CPT

## 2023-10-13 PROCEDURE — 85025 COMPLETE CBC W/AUTO DIFF WBC: CPT

## 2023-10-13 PROCEDURE — 93010 EKG 12-LEAD: ICD-10-PCS | Mod: ,,, | Performed by: INTERNAL MEDICINE

## 2023-10-13 PROCEDURE — 93010 ELECTROCARDIOGRAM REPORT: CPT | Mod: ,,, | Performed by: INTERNAL MEDICINE

## 2023-10-13 PROCEDURE — 80053 COMPREHEN METABOLIC PANEL: CPT

## 2023-10-13 PROCEDURE — 84145 PROCALCITONIN (PCT): CPT | Performed by: STUDENT IN AN ORGANIZED HEALTH CARE EDUCATION/TRAINING PROGRAM

## 2023-10-13 PROCEDURE — 25000003 PHARM REV CODE 250

## 2023-10-13 PROCEDURE — 81001 URINALYSIS AUTO W/SCOPE: CPT

## 2023-10-13 PROCEDURE — 92610 EVALUATE SWALLOWING FUNCTION: CPT

## 2023-10-13 PROCEDURE — 25500020 PHARM REV CODE 255: Performed by: STUDENT IN AN ORGANIZED HEALTH CARE EDUCATION/TRAINING PROGRAM

## 2023-10-13 PROCEDURE — 80197 ASSAY OF TACROLIMUS: CPT | Performed by: STUDENT IN AN ORGANIZED HEALTH CARE EDUCATION/TRAINING PROGRAM

## 2023-10-13 PROCEDURE — 84484 ASSAY OF TROPONIN QUANT: CPT

## 2023-10-13 PROCEDURE — 99285 EMERGENCY DEPT VISIT HI MDM: CPT | Mod: 25

## 2023-10-13 PROCEDURE — 20600001 HC STEP DOWN PRIVATE ROOM

## 2023-10-13 PROCEDURE — 94640 AIRWAY INHALATION TREATMENT: CPT

## 2023-10-13 PROCEDURE — 84484 ASSAY OF TROPONIN QUANT: CPT | Mod: 91

## 2023-10-13 PROCEDURE — 83605 ASSAY OF LACTIC ACID: CPT | Performed by: STUDENT IN AN ORGANIZED HEALTH CARE EDUCATION/TRAINING PROGRAM

## 2023-10-13 PROCEDURE — 99223 PR INITIAL HOSPITAL CARE,LEVL III: ICD-10-PCS | Mod: GC,,, | Performed by: STUDENT IN AN ORGANIZED HEALTH CARE EDUCATION/TRAINING PROGRAM

## 2023-10-13 PROCEDURE — 96374 THER/PROPH/DIAG INJ IV PUSH: CPT

## 2023-10-13 PROCEDURE — 99223 1ST HOSP IP/OBS HIGH 75: CPT | Mod: GC,,, | Performed by: STUDENT IN AN ORGANIZED HEALTH CARE EDUCATION/TRAINING PROGRAM

## 2023-10-13 RX ORDER — NITROGLYCERIN 0.4 MG/1
0.4 TABLET SUBLINGUAL
Status: ACTIVE | OUTPATIENT
Start: 2023-10-13 | End: 2023-10-13

## 2023-10-13 RX ORDER — AZITHROMYCIN 250 MG/1
500 TABLET, FILM COATED ORAL DAILY
Status: DISCONTINUED | OUTPATIENT
Start: 2023-10-13 | End: 2023-10-15

## 2023-10-13 RX ORDER — IBUPROFEN 200 MG
16 TABLET ORAL
Status: DISCONTINUED | OUTPATIENT
Start: 2023-10-13 | End: 2023-10-20 | Stop reason: HOSPADM

## 2023-10-13 RX ORDER — IBUPROFEN 200 MG
24 TABLET ORAL
Status: DISCONTINUED | OUTPATIENT
Start: 2023-10-13 | End: 2023-10-20 | Stop reason: HOSPADM

## 2023-10-13 RX ORDER — FUROSEMIDE 10 MG/ML
40 INJECTION INTRAMUSCULAR; INTRAVENOUS
Status: COMPLETED | OUTPATIENT
Start: 2023-10-13 | End: 2023-10-13

## 2023-10-13 RX ORDER — TALC
6 POWDER (GRAM) TOPICAL NIGHTLY PRN
Status: DISCONTINUED | OUTPATIENT
Start: 2023-10-13 | End: 2023-10-20 | Stop reason: HOSPADM

## 2023-10-13 RX ORDER — TACROLIMUS 1 MG/1
2 CAPSULE ORAL 2 TIMES DAILY
Status: DISCONTINUED | OUTPATIENT
Start: 2023-10-13 | End: 2023-10-15

## 2023-10-13 RX ORDER — PROCHLORPERAZINE EDISYLATE 5 MG/ML
5 INJECTION INTRAMUSCULAR; INTRAVENOUS EVERY 6 HOURS PRN
Status: DISCONTINUED | OUTPATIENT
Start: 2023-10-13 | End: 2023-10-20 | Stop reason: HOSPADM

## 2023-10-13 RX ORDER — GLUCAGON 1 MG
1 KIT INJECTION
Status: DISCONTINUED | OUTPATIENT
Start: 2023-10-13 | End: 2023-10-20 | Stop reason: HOSPADM

## 2023-10-13 RX ORDER — FUROSEMIDE 10 MG/ML
40 INJECTION INTRAMUSCULAR; INTRAVENOUS
Status: DISCONTINUED | OUTPATIENT
Start: 2023-10-13 | End: 2023-10-14

## 2023-10-13 RX ORDER — FAMOTIDINE 20 MG/1
20 TABLET, FILM COATED ORAL DAILY
Status: DISCONTINUED | OUTPATIENT
Start: 2023-10-13 | End: 2023-10-18

## 2023-10-13 RX ORDER — PREDNISONE 5 MG/1
5 TABLET ORAL DAILY
Status: DISCONTINUED | OUTPATIENT
Start: 2023-10-14 | End: 2023-10-20 | Stop reason: HOSPADM

## 2023-10-13 RX ORDER — ASPIRIN 81 MG/1
81 TABLET ORAL DAILY
Status: DISCONTINUED | OUTPATIENT
Start: 2023-10-13 | End: 2023-10-18

## 2023-10-13 RX ORDER — ALBUTEROL SULFATE 2.5 MG/.5ML
2.5 SOLUTION RESPIRATORY (INHALATION) EVERY 4 HOURS
Status: DISCONTINUED | OUTPATIENT
Start: 2023-10-13 | End: 2023-10-16

## 2023-10-13 RX ORDER — ONDANSETRON 8 MG/1
8 TABLET, ORALLY DISINTEGRATING ORAL EVERY 8 HOURS PRN
Status: DISCONTINUED | OUTPATIENT
Start: 2023-10-13 | End: 2023-10-20 | Stop reason: HOSPADM

## 2023-10-13 RX ORDER — SODIUM CHLORIDE 0.9 % (FLUSH) 0.9 %
10 SYRINGE (ML) INJECTION EVERY 12 HOURS PRN
Status: DISCONTINUED | OUTPATIENT
Start: 2023-10-13 | End: 2023-10-20 | Stop reason: HOSPADM

## 2023-10-13 RX ORDER — NALOXONE HCL 0.4 MG/ML
0.02 VIAL (ML) INJECTION
Status: DISCONTINUED | OUTPATIENT
Start: 2023-10-13 | End: 2023-10-20 | Stop reason: HOSPADM

## 2023-10-13 RX ORDER — AMIODARONE HYDROCHLORIDE 200 MG/1
200 TABLET ORAL DAILY
Status: DISCONTINUED | OUTPATIENT
Start: 2023-10-13 | End: 2023-10-20 | Stop reason: HOSPADM

## 2023-10-13 RX ORDER — OXYMETAZOLINE HCL 0.05 %
2 SPRAY, NON-AEROSOL (ML) NASAL 2 TIMES DAILY
Status: DISCONTINUED | OUTPATIENT
Start: 2023-10-13 | End: 2023-10-15

## 2023-10-13 RX ORDER — ATORVASTATIN CALCIUM 10 MG/1
10 TABLET, FILM COATED ORAL DAILY
Status: DISCONTINUED | OUTPATIENT
Start: 2023-10-14 | End: 2023-10-18

## 2023-10-13 RX ADMIN — FAMOTIDINE 20 MG: 20 TABLET ORAL at 09:10

## 2023-10-13 RX ADMIN — Medication 6 MG: at 09:10

## 2023-10-13 RX ADMIN — CEFTRIAXONE 1 G: 1 INJECTION, POWDER, FOR SOLUTION INTRAMUSCULAR; INTRAVENOUS at 10:10

## 2023-10-13 RX ADMIN — OXYMETAZOLINE HYDROCHLORIDE 2 SPRAY: 0.05 SPRAY NASAL at 11:10

## 2023-10-13 RX ADMIN — ALBUTEROL SULFATE 2.5 MG: 2.5 SOLUTION RESPIRATORY (INHALATION) at 08:10

## 2023-10-13 RX ADMIN — APIXABAN 5 MG: 5 TABLET, FILM COATED ORAL at 09:10

## 2023-10-13 RX ADMIN — TACROLIMUS 2 MG: 1 CAPSULE ORAL at 09:10

## 2023-10-13 RX ADMIN — ALBUTEROL SULFATE 2.5 MG: 2.5 SOLUTION RESPIRATORY (INHALATION) at 12:10

## 2023-10-13 RX ADMIN — FUROSEMIDE 40 MG: 10 INJECTION, SOLUTION INTRAVENOUS at 09:10

## 2023-10-13 RX ADMIN — CEFTRIAXONE 1 G: 1 INJECTION, POWDER, FOR SOLUTION INTRAMUSCULAR; INTRAVENOUS at 11:10

## 2023-10-13 RX ADMIN — AZITHROMYCIN 500 MG: 500 INJECTION, POWDER, LYOPHILIZED, FOR SOLUTION INTRAVENOUS at 12:10

## 2023-10-13 NOTE — ASSESSMENT & PLAN NOTE
Patient with Long standing persistent (>12 months) atrial fibrillation which is controlled currently with Beta Blocker and Amiodarone. Patient is currently in sinus rhythm.[id number] Score: 2. . Anticoagulation indicated. Anticoagulation done with apixaban.  S/p cardioversion in March of 2022    - Continue amio, metoprolol, and apixaban  - monitor on telemetry

## 2023-10-13 NOTE — ASSESSMENT & PLAN NOTE
Cr. Today was 4.1, baseline around 3.7-4. Managed by nephrology outpatient    - avoid nephrotoxic drugs and renally dose medications  - strict I/Os

## 2023-10-13 NOTE — PHARMACY MED REC
"Admission Medication History     The home medication history was taken by Caroline Hinds.    You may go to "Admission" then "Reconcile Home Medications" tabs to review and/or act upon these items.     The home medication list has been updated by the Pharmacy department.   Please read ALL comments highlighted in yellow.   Please address this information as you see fit.    Feel free to contact us if you have any questions or require assistance.      The medications listed below were removed from the home medication list. Please reorder if appropriate:  Patient reports no longer taking the following medication(s):  ASPIRIN 81 MG    Medications listed below were obtained from: Patient/family  Current Outpatient Medications on File Prior to Encounter   Medication Sig    acetaminophen (TYLENOL) 500 MG tablet Take 1 tablet (500 mg total) by mouth every 6 (six) hours as needed for Pain.    albuterol (PROVENTIL) 2.5 mg /3 mL (0.083 %) nebulizer solution Take 3 mLs (2.5 mg total) by nebulization every 6 (six) hours as needed for Wheezing. Rescue    albuterol (VENTOLIN HFA) 90 mcg/actuation inhaler Inhale 2 puffs into the lungs every 6 (six) hours as needed for Wheezing or Shortness of Breath. Rescue    amiodarone (PACERONE) 200 MG Tab Take 1 tablet (200 mg total) by mouth once daily.    apixaban (ELIQUIS) 5 mg Tab Take 1 tablet (5 mg total) by mouth 2 (two) times daily.    atorvastatin (LIPITOR) 10 MG tablet TAKE 1 TABLET BY MOUTH EVERY DAY    b complex vitamins (B COMPLEX-VITAMIN B12) tablet Take 1 tablet by mouth once daily.    calcium carbonate (CALCIUM 600 ORAL) Take 1 tablet by mouth once daily.    doxazosin (CARDURA) 4 MG tablet Take 1 tablet (4 mg total) by mouth every 12 (twelve) hours.    famotidine (PEPCID) 20 MG tablet Take 1 tablet (20 mg total) by mouth 2 (two) times daily.    fexofenadine HCl (ALLEGRA ORAL) Take 1 tablet by mouth daily as needed (allergies).    fluticasone propionate (FLONASE) 50 mcg/actuation " nasal spray 1 spray by Each Nostril route daily as needed for Allergies.    furosemide (LASIX) 40 MG tablet Take 20 mg by mouth every other day.    gabapentin (NEURONTIN) 100 MG capsule Take 100 mg by mouth as needed (pain).    hydrALAZINE (APRESOLINE) 50 MG tablet TAKE 1 TABLET (50 MG TOTAL) BY MOUTH 3 (THREE) TIMES DAILY. Last filled 3-8-23 for 90 day supply. Pt states had back supply    metoprolol succinate (TOPROL-XL) 25 MG 24 hr tablet Take 12.5 mg by mouth once daily.    MITIGARE 0.6 mg Cap TAKE 1 CAPSULE BY MOUTH TWICE A DAY AS NEEDED GOUT FLARE UP TO 3 DAYS    multivitamin (THERAGRAN) per tablet Take 1 tablet by mouth Daily.    NIFEdipine (PROCARDIA-XL) 60 MG (OSM) 24 hr tablet Take 1 tablet (60 mg total) by mouth 2 (two) times a day.    omeprazole (PRILOSEC) 20 MG capsule Take 20 mg by mouth daily as needed (heartburn).    oxyCODONE (ROXICODONE) 5 MG immediate release tablet Take 1 tablet (5 mg total) by mouth every 4 (four) hours as needed (pain scale 7-10).    paricalcitoL (ZEMPLAR) 1 MCG capsule TAKE 1 CAPSULE ON MONDAY, WEDNESDAY AND FRIDAY    predniSONE (DELTASONE) 5 MG tablet TAKE 1 TABLET BY MOUTH EVERY DAY IN THE MORNING    tacrolimus (PROGRAF) 1 MG Cap Take 2 capsules (2 mg total) by mouth every morning AND 2 capsules (2 mg total) every evening.    vitamin D 1000 units Tab Take 370 mg by mouth 2 (two) times daily. 2 tablets BID    ammonium lactate 12 % Crea Apply 1 g topically once daily.    fluticasone propion-salmeterol 115-21 mcg/dose (ADVAIR HFA) 115-21 mcg/actuation HFAA inhaler Inhale 2 puffs into the lungs every 12 (twelve) hours. Controller    pulse oximeter (PULSE OXIMETER) device by Apply Externally route 2 (two) times a day. Use twice daily at 8 AM and 3 PM and record the value in [facility] as directed.                 Caroline Hinds  [id number]                  .

## 2023-10-13 NOTE — ASSESSMENT & PLAN NOTE
"Hgb 8.2 on admit, around baseline    Lab Results   Component Value Date    IRON 54 02/10/2023    TIBC 142 (L) 02/10/2023    FERRITIN 1,911 (H) 02/20/2023     Lab Results   Component Value Date    FOLATE 8.8 02/28/2023     Lab Results   Component Value Date    KQRWVHIT70 278 06/20/2023     No results found for: "RETICCTPCT"    Likely secondary to CKD    Plan:   -   Lab Results   Component Value Date    HGB 8.2 (L) 10/13/2023     - Daily CBC   - f/u iron studies, vitamin B12, and folate  - Transfuse if Hgb < 7     "

## 2023-10-13 NOTE — ED NOTES
Telemetry Verification   Patient placed on Telemetry Box  Verified on ED monitor  Box [id number]   Monitor Tech     Rate 86   Rhythm NSR

## 2023-10-13 NOTE — PT/OT/SLP EVAL
"Speech Language Pathology Evaluation  Bedside Swallow/ Discharge Summary     Patient Name:  Ibrahima Phelps   MRN:  [id number]  Admitting Diagnosis: <principal problem not specified>    Recommendations:                 General Recommendations:  Follow-up not indicated  Diet recommendations:  Regular Diet - IDDSI Level 7, Thin liquids - IDDSI Level 0   Aspiration Precautions: Standard aspiration precautions   General Precautions: Standard,    Communication strategies:  none    Assessment:     Ibrahima Phelps is a 67 y.o. male with an SLP diagnosis of  functional swallow .  No acute ST needs.     History:     Past Medical History:   Diagnosis Date    Atrial fibrillation     CAD (coronary artery disease) 2006    MI in 2006    CHF (congestive heart failure) 2017    CKD (chronic kidney disease) stage 3, GFR 30-59 ml/min     Dr. Latif.  in transplanted kidney    COVID-19 2/7/2023    Diverticulosis     Hyperlipidemia     Hypertension     Keloid cicatrix     Metabolic bone disease     Other emphysema 10/1/2019    Pericarditis     S/P kidney transplant 1992    x2 (1992 & 2005),    Thrombocytopenia     Thyroid disease     Tobacco use 09/05/2014     Past Surgical History:   Procedure Laterality Date    CARDIOVERSION  04/30/15    CHOLECYSTECTOMY      COLONOSCOPY  April 20, 2011    Diverticulosis, repeat recommended in 3 yrs., repeat colonoscopy 2014 revealed 2 polyps.  He should return in 5 years.    COLONOSCOPY N/A 3/13/2020    Procedure: COLONOSCOPY;  Surgeon: Chon Casper MD;  Location: [facility] ([address] [place] [address]);  Service: Endoscopy;  Laterality: N/A;  ok to hold coumadin x5days-see telephone encounter 2/4/20-tb    COLONOSCOPY N/A 2/4/2021    Procedure: COLONOSCOPY;  Surgeon: Chon Casper MD;  Location: [address]);  Service: Endoscopy;  Laterality: N/A;  Eliquis - per Dr. GAVIN Blunt ok to hold x 2 days and "restarted asap"- ERW  last prep "poor", [id number] ordered/ Pt requests Dr. Casper  prep ins. mailed - COVID " "screening on 2/1/21 PCW- ERW    COLONOSCOPY N/A 3/3/2021    Procedure: COLONOSCOPY;  Surgeon: Chon Casper MD;  Location: [facility] (4TH FLR);  Service: Endoscopy;  Laterality: N/A;  Patient with his second poor bowel pre and has poor prep today.  If patient not intersted or can't get colonoscopy tomorrow will need constipation bowel prep and will need to restart his Eliquis today.Thanks,Chon     per Dr Lopez to hold Eliquis 2 days prior      COVID     CORONARY ANGIOPLASTY WITH STENT PLACEMENT  9/13/2006    IRRIGATION AND DEBRIDEMENT Right 8/30/2023    Procedure: IRRIGATION AND DEBRIDEMENT, RIGHT MIDDLE FINGER;  Surgeon: Martin Larsen MD;  Location: [facility] OR 2ND FLR;  Service: Orthopedics;  Laterality: Right;    KIDNEY TRANSPLANT  2005    PARATHYROIDECTOMY      TREATMENT OF CARDIAC ARRHYTHMIA N/A 3/28/2022    Procedure: CARDIOVERSION;  Surgeon: Migue Blunt MD;  Location: [facility] EP LAB;  Service: Cardiology;  Laterality: N/A;  AF, DCC, MAC, GP, 3 PREP*RODRIGO deferred pt 100% compliant*     Chief Complaint   Patient presents with    Shortness of Breath       + stent      "This is a 67-year-old man with a past history of coronary artery disease with 1 stent placed in 2006, paroxysmal atrial fibrillation on Eliquis, CHF, hypertension, chronic kidney disease with kidney transplant in 1992 and 2005, who is presenting for 1 week of worsening shortness of breath on exertion, and swollen ankles.  He says that for 1 week he has been getting noticeably more short of breath while walking, and has been unable to lie flat and woke up early this morning gasping for air, and had to sit upright to catch his breath.  He takes Lasix every other day for swelling in his legs, but has noted that his ankles have been increasing in swelling over the past week, and he has a new focal left heel tenderness.  He also reports diarrhea that started yesterday.  He has a history of a hiatal hernia with reflux, and reports that " "earlier this morning he had some substernal burning that radiated to his back that was relieved with omeprazole.  He denies headaches, vision changes, changes in hearing, chest pain, dysphagia, nausea, vomiting, constipation, and any changes in his urination.  The history is provided by the patient and the spouse."    Prior Intubation HX:  none    Modified Barium Swallow: none    Chest X-Rays: 10/13:"Right lung base consolidation, as above.  Clinical correlation and follow-up recommended"    Prior diet: regular/thin    Subjective     Sessions cleared with RN. Pt awake/alert and cooperative. Spouse at bedside.     Pain/Comfort:  Pain Rating 1: 0/10  Pain Rating Post-Intervention 1: 0/10    Respiratory Status: Room air    Objective:     Oral Musculature Evaluation  Oral Musculature: WFL  Dentition: present and adequate  Secretion Management: adequate  Mucosal Quality: adequate  Mandibular Strength and Mobility: WFL  Oral Labial Strength and Mobility: WFL  Lingual Strength and Mobility: WFL  Velar Elevation: WFL  Buccal Strength and Mobility: WFL  Volitional Cough: present  Volitional Swallow: present  Voice Prior to PO Intake: clear    Bedside Swallow Eval:   Consistencies Assessed:  Thin liquids 8 oz open cup and straw  Solids broccoli from tray       Oral Phase:   WFL    Pharyngeal Phase:   no overt clinical signs/symptoms of aspiration  no overt clinical signs/symptoms of pharyngeal dysphagia    Compensatory Strategies  None    Treatment: HOB raised. Pt able to self present and tolerate all consistencies. Recommend he continue current diet. Education provided re: role of SLP, diet recs, swallow precs, s/s aspiration and POC.  Pt verbalized understanding and agreement. No acute ST needs.     Plan:     Plan of Care reviewed with:  patient, spouse   SLP Follow-Up:  No       Discharge recommendations:    home  Barriers to Discharge:  None    Time Tracking:     SLP Treatment Date:   10/13/23  Speech Start Time:  " 1310  Speech Stop Time:  1318     Speech Total Time (min):  8 min    Billable Minutes: Eval Swallow and Oral Function 8    10/13/2023

## 2023-10-13 NOTE — HPI
"66 yo M active smoker w/ COPD, CAD s/p CABG x1 in 2006, paroxysmal Afib s/p cardioversion 3/2022 (on Eliquis, Metoprolol, and Amiodarone), HFpEF (EF 65%; not currently taking diuretics), hiatal hernia with reflux, HTN on Hydralazine, CKD4 s/p kidney transplants in 1992 and 2005 on Tacrolimus and Prednisone, presented to the ED with his wife complaining about worsening shortness of breath/DAUGHERTY; infrequent, nonproductive cough; and severe, sharp L posterior heel pain that started a few days ago. Per the patient, he woke up last night "gasping for air," short of breath, and with substernal chest pain radiating to his back. The chest pain resolved with Omeprazole; denies chest pain at this time. The shortness of breath and infrequent, nonproductive cough typically occur at rest and are worse with walking. The severe, sharp L posterior heel pain is worse with movement and walking, and has not responded to leg elevation. Of note, he has a history of tenosynovitis. He noted significant, watery diarrhea associated with stomach pain all day yesterday that resolved with Peptobismol and water; denies diarrhea this morning. Denies fevers, chills, nausea, vomiting, constipation, dysuria, hematuria, palpitations, headache, or vision changes.     His presenting vital signs were a temperature of 98.3, pulse rate of 57-84 bpm, resp rate of 20-28, blood pressure ranging from 156-169/69-74, MAP of 104-107, and SpO2 % on RA. Troponin (0.081), Magnesium (nml), CBC (Hgb 8.2, platelets 127), CMP (CO2 19, BUN 33, Cr 4.1), and BNP (1,168) were ordered. CXR showed RLL consolidation. XR L ankle showed calcaneal enthesopathy. CTAP showed trace bilateral pleural effusions, bibasilar dependent pulmonary opacities, new prominence/thickening of the L iliac fossa transplant kidney's ureter. Started on CTX, AZT, and Lasix  "

## 2023-10-13 NOTE — ED NOTES
Medicine team aware that he is wanting his IV out and no more sticks.  Will remove IV and update chart of changes.

## 2023-10-13 NOTE — H&P
"[facility] - Emergency Dept  [facility]  History & Physical    Patient Name: Ibrahima Phelps  MRN: [id number]  Patient Class: IP- Inpatient  Admission Date: 10/13/2023  Attending Physician: Agusto Giraldo, *   Primary Care Provider: Anatoliy Colindres MD         Patient information was obtained from patient and ER records.     Subjective:     Principal Problem:<principal problem not specified>    Chief Complaint:   Chief Complaint   Patient presents with    Shortness of Breath     + stent        HPI: 66 yo M active smoker w/ COPD, CAD s/p CABG x1 in 2006, paroxysmal Afib s/p cardioversion 3/2022 (on Eliquis, Metoprolol, and Amiodarone), HFpEF (EF 65%; not currently taking diuretics), hiatal hernia with reflux, HTN on Hydralazine, CKD4 s/p kidney transplants in 1992 and 2005 on Tacrolimus and Prednisone, presented to the ED with his wife complaining about worsening shortness of breath/DAUGHERTY; infrequent, nonproductive cough; and severe, sharp L posterior heel pain that started a few days ago. Per the patient, he woke up last night "gasping for air," short of breath, and with substernal chest pain radiating to his back. The chest pain resolved with Omeprazole; denies chest pain at this time. The shortness of breath and infrequent, nonproductive cough typically occur at rest and are worse with walking. The severe, sharp L posterior heel pain is worse with movement and walking, and has not responded to leg elevation. Of note, he has a history of tenosynovitis. He noted significant, watery diarrhea associated with stomach pain all day yesterday that resolved with Peptobismol and water; denies diarrhea this morning. Denies fevers, chills, nausea, vomiting, constipation, dysuria, hematuria, palpitations, headache, or vision changes.     His presenting vital signs were a temperature of 98.3, pulse rate of 57-84 bpm, resp rate of 20-28, blood pressure ranging from 156-169/69-74, MAP of 104-107, and SpO2 % on RA. " "Troponin (0.081), Magnesium (nml), CBC (Hgb 8.2, platelets 127), CMP (CO2 19, BUN 33, Cr 4.1), and BNP (1,168) were ordered. CXR showed RLL consolidation. XR L ankle showed calcaneal enthesopathy. CTAP showed trace bilateral pleural effusions, bibasilar dependent pulmonary opacities, new prominence/thickening of the L iliac fossa transplant kidney's ureter. Started on CTX, AZT, and Lasix      Past Medical History:   Diagnosis Date    Atrial fibrillation     CAD (coronary artery disease) 2006    MI in 2006    CHF (congestive heart failure) 2017    CKD (chronic kidney disease) stage 3, GFR 30-59 ml/min     Dr. Latif.  in transplanted kidney    COVID-19 2/7/2023    Diverticulosis     Hyperlipidemia     Hypertension     Keloid cicatrix     Metabolic bone disease     Other emphysema 10/1/2019    Pericarditis     S/P kidney transplant 1992    x2 (1992 & 2005),    Thrombocytopenia     Thyroid disease     Tobacco use 09/05/2014       Past Surgical History:   Procedure Laterality Date    CARDIOVERSION  04/30/15    CHOLECYSTECTOMY      COLONOSCOPY  April 20, 2011    Diverticulosis, repeat recommended in 3 yrs., repeat colonoscopy 2014 revealed 2 polyps.  He should return in 5 years.    COLONOSCOPY N/A 3/13/2020    Procedure: COLONOSCOPY;  Surgeon: Chon Casper MD;  Location: [facility] (4TH FLR);  Service: Endoscopy;  Laterality: N/A;  ok to hold coumadin x5days-see telephone encounter 2/4/20-tb    COLONOSCOPY N/A 2/4/2021    Procedure: COLONOSCOPY;  Surgeon: Chon Casper MD;  Location: [facility] (4TH FLR);  Service: Endoscopy;  Laterality: N/A;  Eliquis - per Dr. GAVIN Blunt ok to hold x 2 days and "restarted asap"- ERW  last prep "poor", [id number] ordered/ Pt requests Dr. Casper  prep ins. mailed - COVID screening on 2/1/21 PCW- ERW    COLONOSCOPY N/A 3/3/2021    Procedure: COLONOSCOPY;  Surgeon: Chon Casper MD;  Location: [facility] (4TH FLR);  Service: Endoscopy;  Laterality: N/A;  " Patient with his second poor bowel pre and has poor prep today.  If patient not intersted or can't get colonoscopy tomorrow will need constipation bowel prep and will need to restart his Eliquis today.Thanks,Chon     per Dr Lopez to hold Eliquis 2 days prior      COVID     CORONARY ANGIOPLASTY WITH STENT PLACEMENT  9/13/2006    IRRIGATION AND DEBRIDEMENT Right 8/30/2023    Procedure: IRRIGATION AND DEBRIDEMENT, RIGHT MIDDLE FINGER;  Surgeon: Martin Larsen MD;  [facility];  Service: Orthopedics;  Laterality: Right;    KIDNEY TRANSPLANT  2005    PARATHYROIDECTOMY      TREATMENT OF CARDIAC ARRHYTHMIA N/A 3/28/2022    Procedure: CARDIOVERSION;  Surgeon: Migue Blunt MD;  Location: [facility] EP LAB;  Service: Cardiology;  Laterality: N/A;  AF, DCC, MAC, GP, 3 PREP*RODRIGO deferred pt 100% compliant*       Review of patient's allergies indicates:   Allergen Reactions    Ace inhibitors Swelling    Verapamil Other (See Comments)     Other reaction(s): Unknown    Povidone-iodine Itching       No current facility-administered medications on file prior to encounter.     Current Outpatient Medications on File Prior to Encounter   Medication Sig    amiodarone (PACERONE) 200 MG Tab Take 1 tablet (200 mg total) by mouth once daily.    apixaban (ELIQUIS) 5 mg Tab Take 1 tablet (5 mg total) by mouth 2 (two) times daily.    atorvastatin (LIPITOR) 10 MG tablet TAKE 1 TABLET BY MOUTH EVERY DAY    b complex vitamins (B COMPLEX-VITAMIN B12) tablet Take 1 tablet by mouth once daily.    calcium carbonate (CALCIUM 600 ORAL) Take by mouth. Pt taking 1200 daily    doxazosin (CARDURA) 4 MG tablet Take 1 tablet (4 mg total) by mouth every 12 (twelve) hours.    famotidine (PEPCID) 20 MG tablet Take 1 tablet (20 mg total) by mouth 2 (two) times daily.    metoprolol succinate (TOPROL-XL) 25 MG 24 hr tablet Take 1 tablet (25 mg total) by mouth once daily.    multivitamin (THERAGRAN) per tablet Take 1 tablet  by mouth Daily.    NIFEdipine (PROCARDIA-XL) 60 MG (OSM) 24 hr tablet Take 1 tablet (60 mg total) by mouth 2 (two) times a day.    tacrolimus (PROGRAF) 1 MG Cap Take 2 capsules (2 mg total) by mouth every morning AND 2 capsules (2 mg total) every evening.    vitamin D 1000 units Tab Take 370 mg by mouth 2 (two) times daily. 2 tablets BID    acetaminophen (TYLENOL) 500 MG tablet Take 1 tablet (500 mg total) by mouth every 6 (six) hours as needed for Pain.    albuterol (PROVENTIL) 2.5 mg /3 mL (0.083 %) nebulizer solution Take 3 mLs (2.5 mg total) by nebulization every 6 (six) hours as needed for Wheezing. Rescue    albuterol (VENTOLIN HFA) 90 mcg/actuation inhaler Inhale 2 puffs into the lungs every 6 (six) hours as needed for Wheezing or Shortness of Breath. Rescue    ammonium lactate 12 % Crea Apply 1 g topically once daily.    aspirin (ECOTRIN) 81 MG EC tablet Take 1 tablet by mouth Daily.    fluticasone propion-salmeterol 115-21 mcg/dose (ADVAIR HFA) 115-21 mcg/actuation HFAA inhaler Inhale 2 puffs into the lungs every 12 (twelve) hours. Controller    fluticasone propionate (FLONASE) 50 mcg/actuation nasal spray 1 spray (50 mcg total) by Each Nostril route once daily.    furosemide (LASIX) 40 MG tablet Take 0.5 tablets (20 mg total) by mouth 2 (two) times daily. HOLD this medication on discharge per nephrology recs, and resume only once needed for lower extremity edema.    gabapentin (NEURONTIN) 100 MG capsule Take 1 capsule (100 mg total) by mouth as needed.    hydrALAZINE (APRESOLINE) 50 MG tablet TAKE 1 TABLET (50 MG TOTAL) BY MOUTH 3 (THREE) TIMES DAILY.    MITIGARE 0.6 mg Cap TAKE 1 CAPSULE BY MOUTH TWICE A DAY AS NEEDED GOUT FLARE UP TO 3 DAYS    omeprazole (PRILOSEC) 20 MG capsule Take 1 capsule (20 mg total) by mouth once daily.    oxyCODONE (ROXICODONE) 5 MG immediate release tablet Take 1 tablet (5 mg total) by mouth every 4 (four) hours as needed (pain scale 7-10).    paricalcitoL  (ZEMPLAR) 1 MCG capsule TAKE 1 CAPSULE ON MONDAY, WEDNESDAY AND FRIDAY    predniSONE (DELTASONE) 5 MG tablet TAKE 1 TABLET BY MOUTH EVERY DAY IN THE MORNING    pulse oximeter (PULSE OXIMETER) device by Apply Externally route 2 (two) times a day. Use twice daily at 8 AM and 3 PM and record the value in MyChart as directed.     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     Patient is currently retired   Substance and Sexual Activity    Alcohol use: No    Drug use: No    Sexual activity: Yes     Partners: Female     Review of Systems   Constitutional:  Positive for fatigue. Negative for fever.   HENT:  Negative for congestion and sore throat.    Eyes: Negative.    Respiratory:  Positive for cough and shortness of breath. Negative for chest tightness and wheezing.    Cardiovascular:  Positive for leg swelling. Negative for chest pain and palpitations.   Gastrointestinal:  Negative for abdominal distention, abdominal pain and vomiting.   Genitourinary:  Negative for difficulty urinating, dysuria and hematuria.   Musculoskeletal:  Negative for arthralgias.        L posterior heel pain   Skin:  Negative for color change and pallor.   Allergic/Immunologic: Positive for immunocompromised state.   Neurological:  Negative for weakness and numbness.   Psychiatric/Behavioral:  Negative for behavioral problems and confusion.      Objective:     Vital Signs (Most  Recent):  Temp: 98.3 °F (36.8 °C) (10/13/23 0759)  Pulse: 61 (10/13/23 1220)  Resp: 20 (10/13/23 1220)  BP: (!) 163/74 (10/13/23 1203)  SpO2: (!) 94 % (10/13/23 1220) Vital Signs (24h Range):  Temp:  [98.3 °F (36.8 °C)] 98.3 °F (36.8 °C)  Pulse:  [57-84] 61  Resp:  [20-28] 20  SpO2:  [94 %-100 %] 94 %  BP: (156-169)/(69-74) 163/74     Weight: 95.3 kg (210 lb)  Body mass index is 27.71 kg/m².     Physical Exam  Constitutional:       General: He is in acute distress.   HENT:      Head: Normocephalic and atraumatic.      Nose: Nose normal.      Mouth/Throat:      Mouth: Mucous membranes are dry.   Eyes:      Extraocular Movements: Extraocular movements intact.      Conjunctiva/sclera: Conjunctivae normal.   Cardiovascular:      Rate and Rhythm: Normal rate and regular rhythm.      Pulses: Normal pulses.      Heart sounds: Normal heart sounds.   Pulmonary:      Effort: Respiratory distress present.      Breath sounds: No wheezing or rales.   Abdominal:      General: Abdomen is flat. Bowel sounds are normal. There is no distension.      Palpations: Abdomen is soft.      Tenderness: There is no abdominal tenderness.   Musculoskeletal:         General: Normal range of motion.      Cervical back: Normal range of motion.      Right lower leg: Edema present.      Left lower leg: Edema present.   Skin:     General: Skin is warm and dry.   Neurological:      General: No focal deficit present.      Mental Status: He is alert and oriented to person, place, and time.   Psychiatric:         Mood and Affect: Mood normal.         Behavior: Behavior normal.         Thought Content: Thought content normal.         Judgment: Judgment normal.                Significant Labs: All pertinent labs within the past 24 hours have been reviewed.    Significant Imaging: I have reviewed all pertinent imaging results/findings within the past 24 hours.    Assessment/Plan:     Consolidation lung  Patchy consolidation observed on CXR in ED    - CT  "Chest ordered  - Pt started on CAP coverage      CKD (chronic kidney disease), stage IV  Cr. Today was 4.1, baseline around 3.7-4. Managed by nephrology outpatient    - avoid nephrotoxic drugs and renally dose medications  - strict I/Os      Anemia in stage 4 chronic kidney disease  Hgb 8.2 on admit, around baseline    Lab Results   Component Value Date    IRON 54 02/10/2023    TIBC 142 (L) 02/10/2023    FERRITIN 1,911 (H) 02/20/2023     Lab Results   Component Value Date    FOLATE 8.8 02/28/2023     Lab Results   Component Value Date    KQCRXXQT86 278 06/20/2023     No results found for: "RETICCTPCT"    Likely secondary to CKD    Plan:   -   Lab Results   Component Value Date    HGB 8.2 (L) 10/13/2023     - Daily CBC   - f/u iron studies, vitamin B12, and folate  - Transfuse if Hgb < 7       Chronic heart failure with preserved ejection fraction  Patient admitted with shortness of breath, edema, PND, orthopnea, consistent with acute on chronic diastolic CHF exacerbation.  Last TTE in February of 2023; EF 65% with Grade II Diastolic Dysfunction    BNP: BNP  Recent Labs   Lab 10/13/23  0836   BNP 1,168*     CXR: No significant pleural fluid, evidence of patchy consolidation    Troponin:   Recent Labs   Lab 10/13/23  0836   TROPONINI 0.081*       Home Meds:  Prescribed Lasix 40mg though not taking, states he took 20mg every other day over past week 2/2 swelling    Plan:  - 40 Lasix IV in ED  - Strict ins and outs  - TTE pending   - Continue metoprolol      Immunodeficiency due to treatment with immunosuppressive medication  See H/o Kidney transplant    Chronic obstructive pulmonary disease  Pt complaining of cough and shortness of breath, duration 3 days  Saturating appropriately on room air, no wheezing or abnormal breath sounds on auscultation     - home duonebs      Paroxysmal atrial fibrillation  Patient with Long standing persistent (>12 months) atrial fibrillation which is controlled currently with Beta Blocker " and Amiodarone. Patient is currently in sinus rhythm.[id number] Score: 2. . Anticoagulation indicated. Anticoagulation done with apixaban.  S/p cardioversion in March of 2022    - Continue amio, metoprolol, and apixaban  - monitor on telemetry    H/O kidney transplant  Kidney transplant in 1996 and 2003  Cr elevated to 4.1 on admission, around patients baseline     - Continue home tacrolimus and prednisone  - Obtain tacro level  - KTM consulted      Long term (current) use of anticoagulants [V58.61]  Continue apixaban      Mixed hyperlipidemia  Chronic and stable    - continue statin      CAD (coronary artery disease)  CABG in 2005    - Continue ASA and Stating  - Slight troponin elevation on admission, will trend      VTE Risk Mitigation (From admission, onward)         Ordered     apixaban tablet 5 mg  2 times daily         10/13/23 1127                     Evan Porras MD  Department of Hospital Medicine  [facility] - Emergency Dept

## 2023-10-13 NOTE — NURSING
Nurses Note -- 4 Eyes      10/13/2023   6:53 PM      Skin assessed during: Admit      [x] No Altered Skin Integrity Present    []Prevention Measures Documented      [] Yes- Altered Skin Integrity Present or Discovered   [] LDA Added if Not in Epic (Describe Wound)   [] New Altered Skin Integrity was Present on Admit and Documented in LDA   [] Wound Image Taken    Wound Care Consulted? No    Attending Nurse:  Terri Underwood RN/Staff Member:   robinson young

## 2023-10-13 NOTE — ED NOTES
Assumed care of patient Ibrahima Phelps, a 67 y.o. male    Triage note:  Chief Complaint   Patient presents with    Shortness of Breath     + stent     Review of patient's allergies indicates:   Allergen Reactions    Ace inhibitors Swelling    Verapamil Other (See Comments)     Other reaction(s): Unknown    Povidone-iodine Itching     Past Medical History:   Diagnosis Date    Atrial fibrillation     CAD (coronary artery disease) 2006    MI in 2006    CHF (congestive heart failure) 2017    CKD (chronic kidney disease) stage 3, GFR 30-59 ml/min     Dr. Latif.  in transplanted kidney    COVID-19 2/7/2023    Diverticulosis     Hyperlipidemia     Hypertension     Keloid cicatrix     Metabolic bone disease     Other emphysema 10/1/2019    Pericarditis     S/P kidney transplant 1992    x2 (1992 & 2005),    Thrombocytopenia     Thyroid disease     Tobacco use 09/05/2014

## 2023-10-13 NOTE — ASSESSMENT & PLAN NOTE
Pt complaining of cough and shortness of breath, duration 3 days  Saturating appropriately on room air, no wheezing or abnormal breath sounds on auscultation     - home navarro

## 2023-10-13 NOTE — NURSING
Pt arrived in room from ED. Pt aaox4 with abc's Intact. Pt on cont cardiac monitoring. Pt denies NAD and none noted. Pt wife at bedside. Bed at lowest, locked, sr up x 2, call light within reach. Pt/wife instructed to call for assistance. Will cont poc

## 2023-10-13 NOTE — ED PROVIDER NOTES
Encounter Date: 10/13/2023       History     Chief Complaint   Patient presents with    Shortness of Breath     + stent     This is a 67-year-old man with a past history of coronary artery disease with 1 stent placed in 2006, paroxysmal atrial fibrillation on Eliquis, CHF, hypertension, chronic kidney disease with kidney transplant in 1992 and 2005, who is presenting for 1 week of worsening shortness of breath on exertion, and swollen ankles.  He says that for 1 week he has been getting noticeably more short of breath while walking, and has been unable to lie flat and woke up early this morning gasping for air, and had to sit upright to catch his breath.  He takes Lasix every other day for swelling in his legs, but has noted that his ankles have been increasing in swelling over the past week, and he has a new focal left heel tenderness.  He also reports diarrhea that started yesterday.  He has a history of a hiatal hernia with reflux, and reports that earlier this morning he had some substernal burning that radiated to his back that was relieved with omeprazole.  He denies headaches, vision changes, changes in hearing, chest pain, dysphagia, nausea, vomiting, constipation, and any changes in his urination.    The history is provided by the patient and the spouse.     Review of patient's allergies indicates:   Allergen Reactions    Ace inhibitors Swelling    Verapamil Other (See Comments)     Other reaction(s): Unknown    Povidone-iodine Itching     Past Medical History:   Diagnosis Date    Atrial fibrillation     CAD (coronary artery disease) 2006    MI in 2006    CHF (congestive heart failure) 2017    CKD (chronic kidney disease) stage 3, GFR 30-59 ml/min     Dr. Latif.  in transplanted kidney    COVID-19 2/7/2023    Diverticulosis     Hyperlipidemia     Hypertension     Keloid cicatrix     Metabolic bone disease     Other emphysema 10/1/2019    Pericarditis     S/P kidney transplant 1992    x2  "(1992 & 2005),    Thrombocytopenia     Thyroid disease     Tobacco use 09/05/2014     Past Surgical History:   Procedure Laterality Date    CARDIOVERSION  04/30/15    CHOLECYSTECTOMY      COLONOSCOPY  April 20, 2011    Diverticulosis, repeat recommended in 3 yrs., repeat colonoscopy 2014 revealed 2 polyps.  He should return in 5 years.    COLONOSCOPY N/A 3/13/2020    Procedure: COLONOSCOPY;  Surgeon: Chon Casper MD;  Location: [facility] (4TH FLR);  Service: Endoscopy;  Laterality: N/A;  ok to hold coumadin x5days-see telephone encounter 2/4/20-tb    COLONOSCOPY N/A 2/4/2021    Procedure: COLONOSCOPY;  Surgeon: Chon Casper MD;  Location: [facility] (4TH FLR);  Service: Endoscopy;  Laterality: N/A;  Eliquis - per Dr. GAVIN Blunt ok to hold x 2 days and "restarted asap"- ERW  last prep "poor", [id number] ordered/ Pt requests Dr. Casper  prep ins. mailed - COVID screening on 2/1/21 PCW- ERW    COLONOSCOPY N/A 3/3/2021    Procedure: COLONOSCOPY;  Surgeon: Chon Casper MD;  Location: [facility] (4TH FLR);  Service: Endoscopy;  Laterality: N/A;  Patient with his second poor bowel pre and has poor prep today.  If patient not intersted or can't get colonoscopy tomorrow will need constipation bowel prep and will need to restart his Eliquis today.Thanks,Chon Blunt-ok to hold Eliquis 2 days prior      COVID     CORONARY ANGIOPLASTY WITH STENT PLACEMENT  9/13/2006    IRRIGATION AND DEBRIDEMENT Right 8/30/2023    Procedure: IRRIGATION AND DEBRIDEMENT, RIGHT MIDDLE FINGER;  Surgeon: Martin Larsen MD;  Location: [facility] OR [facility] FLR;  Service: Orthopedics;  Laterality: Right;    KIDNEY TRANSPLANT  2005    PARATHYROIDECTOMY      TREATMENT OF CARDIAC ARRHYTHMIA N/A 3/28/2022    Procedure: CARDIOVERSION;  Surgeon: Migue Blunt MD;  Location: [facility] EP LAB;  Service: Cardiology;  Laterality: N/A;  AF, DCC, MAC, GP, 3 PREP*RODRIGO deferred pt 100% compliant*     Family History   Problem Relation " Age of Onset    No Known Problems Sister     No Known Problems Brother     Thyroid disease Mother         s/p surgery    Heart disease Father         had pacemaker    No Known Problems Sister     Kidney failure Sister     Kidney disease Sister     No Known Problems Sister     Kidney disease Brother     Kidney failure Brother         s/p transplant    Diabetes Mellitus Neg Hx     Stroke Neg Hx     Heart attack Neg Hx     Cancer Neg Hx     Celiac disease Neg Hx     Cirrhosis Neg Hx     Colon cancer Neg Hx     Esophageal cancer Neg Hx     Inflammatory bowel disease Neg Hx     Rectal cancer Neg Hx     Stomach cancer Neg Hx     Ulcerative colitis Neg Hx     Liver disease Neg Hx     Liver cancer Neg Hx     Crohn's disease Neg Hx     Melanoma Neg Hx      Social History     Tobacco Use    Smoking status: Former     Current packs/day: 0.00     Average packs/day: 0.5 packs/day for 40.0 years (20.0 ttl pk-yrs)     Types: Cigarettes     Start date: 1982     Quit date: 2022     Years since quittin.6    Smokeless tobacco: Never    Tobacco comments:     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     Patient is currently retired   Substance Use Topics    Alcohol use: No    Drug use: No     Review of Systems    Physical Exam     Initial Vitals   BP Pulse Resp Temp SpO2   10/13/23 0759 10/13/23 0759 10/13/23 0759 10/13/23 0759 10/13/23 0843   (!) 169/69 84 (!) 28 98.3 °F (36.8 °C) 100 %      MAP       --                Physical Exam    Constitutional: He appears well-developed. No distress.   HENT:   Head: Normocephalic and atraumatic.   Mouth/Throat: Oropharynx is clear and moist. No oropharyngeal exudate.   Eyes: Conjunctivae and EOM are normal. Pupils are equal, round, and reactive to light. No scleral icterus.   Neck: Neck supple. JVD (4 cm above clavicle) present.   Normal range of motion.  Cardiovascular:  Normal rate, regular rhythm, normal heart  sounds and intact distal pulses.           Pulmonary/Chest: Breath sounds normal. No respiratory distress. He has no wheezes. He exhibits no tenderness.   He is sitting at 45 degrees and is comfortable talking with 100% saturation with good wave form on pulse oximetry. Visually assessed RR 16.    Abdominal: Abdomen is soft. Bowel sounds are normal. He exhibits no distension. There is no abdominal tenderness.   Musculoskeletal:         General: Tenderness (left heel TTP) and edema (Bilateral +2 edema to midshin) present.      Cervical back: Normal range of motion and neck supple.      Comments: Calf circumference symmetrical, no pain in B/L calves to palpation.  Full ROM R ankle, L ankle unable to dorsiflex due to pain.     Neurological: He is alert and oriented to person, place, and time. No cranial nerve deficit.   Skin: Skin is warm and dry. Capillary refill takes less than 2 seconds.   Psychiatric: He has a normal mood and affect.       ED Course   Procedures  Labs Reviewed   B-TYPE NATRIURETIC PEPTIDE - Abnormal; Notable for the following components:       Result Value    BNP 1,168 (*)     All other components within normal limits   CBC W/ AUTO DIFFERENTIAL - Abnormal; Notable for the following components:    RBC 3.27 (*)     Hemoglobin 8.2 (*)     Hematocrit 26.7 (*)     MCH 25.1 (*)     MCHC 30.7 (*)     RDW 18.6 (*)     Platelets 127 (*)     All other components within normal limits   COMPREHENSIVE METABOLIC PANEL - Abnormal; Notable for the following components:    Chloride 111 (*)     CO2 19 (*)     BUN 33 (*)     Creatinine 4.1 (*)     Calcium 8.2 (*)     Albumin 2.9 (*)     eGFR 15.1 (*)     All other components within normal limits   TROPONIN I - Abnormal; Notable for the following components:    Troponin I 0.081 (*)     All other components within normal limits   CULTURE, BLOOD   CULTURE, BLOOD   CULTURE, RESPIRATORY   MAGNESIUM   TROPONIN I   TACROLIMUS LEVEL   LACTIC ACID, PLASMA        ECG Results               EKG 12-lead (Final result)  Result time 10/13/23 10:18:19      Final result by Interface, Lab In [facility] (10/13/23 10:18:19)                   Narrative:    Test Reason : R06.02,    Vent. Rate : 067 BPM     Atrial Rate : 067 BPM     P-R Int : 180 ms          QRS Dur : 104 ms      QT Int : 438 ms       P-R-T Axes : 078 071 -72 degrees     QTc Int : 462 ms    Normal sinus rhythm  LVH with repolarization abnormality  Abnormal ECG  When compared with ECG of 13-OCT-2023 08:03,  No significant change was found  Confirmed by Luke EDGE MD ([id number]) on 10/13/2023 10:18:09 AM    Referred By: [email or web address]   SELF           Confirmed By:Luke EDGE MD                                  Imaging Results              CT Abdomen Pelvis  Without Contrast (In process)  Result time 10/13/23 12:16:49   Procedure changed from CT Abdomen Pelvis With Contrast                    X-Ray Foot Complete Left (Final result)  Result time 10/13/23 10:18:54      Final result by Ha Llanes MD (10/13/23 10:18:54)                   Impression:      No acute osseous abnormality.  Calcaneal enthesopathy.      Electronically signed by: Ha Llanes MD  Date:    10/13/2023  Time:    10:18               Narrative:    EXAMINATION:  XR FOOT COMPLETE 3 VIEW LEFT    CLINICAL HISTORY:  .  Pain in unspecified foot    TECHNIQUE:  AP, lateral and oblique views of the left foot were performed.    COMPARISON:  06/13/2023    FINDINGS:  Skeletal structures show are intact without acute fracture or destructive change.  Prominent spurs are present at the posterior and plantar aspects of the calcaneus without erosion.  Joint spaces in mid foot are satisfactory.  As a chronic findings, the distal end of the 5th metatarsal is absent, presumably resected, though may have been eroded from remote process.  The IP joint of the little toe again shows a chronic  dislocation.  Joint spaces in the forefoot are satisfactory without significant arthritis.    No  focal soft tissue swelling is seen.  Prominent atherosclerosis calcifications are noted at the ankle and foot.                                       X-Ray Chest AP Portable (Final result)  Result time 10/13/23 10:10:04      Final result by Ha Llanes MD (10/13/23 10:10:04)                   Impression:      Right lung base consolidation, as above.  Clinical correlation and follow-up recommended.      Electronically signed by: Ha Llanes MD  Date:    10/13/2023  Time:    10:10               Narrative:    EXAMINATION:  XR CHEST AP PORTABLE    CLINICAL HISTORY:  shortness of breath;    TECHNIQUE:  Single frontal view of the chest was performed.    COMPARISON:  03/16/2023    FINDINGS:  Heart size is normal.  Mediastinum shows aortic atherosclerosis.  The lungs are expanded.  Right lung base has mild, patchy consolidation; this could represent pneumonia, aspiration, asymmetric edema, etcetera.  Minimal consolidation may be present at left lung base also, though not definite.  Left lung does have some small calcified granulomas.  No significant pleural fluid is seen.  Couple old rib fractures are present on the left.                                       Medications   nitroGLYCERIN SL tablet 0.4 mg (0 mg Sublingual Hold 10/13/23 0915)   azithromycin (ZITHROMAX) 500 mg in dextrose 5 % (D5W) 250 mL IVPB (Vial-Mate) (has no administration in time range)   albuterol sulfate nebulizer solution 2.5 mg (has no administration in time range)   amiodarone tablet 200 mg (has no administration in time range)   apixaban tablet 5 mg (has no administration in time range)   aspirin EC tablet 81 mg (has no administration in time range)   atorvastatin tablet 10 mg (has no administration in time range)   tacrolimus capsule 2 mg (has no administration in time range)   predniSONE tablet 5 mg (has no administration in time range)   metoprolol succinate (TOPROL-XL) 24 hr split tablet 12.5 mg (has no administration in time  range)   sodium chloride 0.9% flush 10 mL (has no administration in time range)   naloxone 0.4 mg/mL injection 0.02 mg (has no administration in time range)   glucose chewable tablet 16 g (has no administration in time range)   glucose chewable tablet 24 g (has no administration in time range)   glucagon (human recombinant) injection 1 mg (has no administration in time range)   ondansetron disintegrating tablet 8 mg (has no administration in time range)   prochlorperazine injection Soln 5 mg (has no administration in time range)   cefTRIAXone (ROCEPHIN) 1 g in dextrose 5 % in water (D5W) 100 mL IVPB (MB+) (has no administration in time range)   azithromycin tablet 500 mg (has no administration in time range)   furosemide injection 40 mg (has no administration in time range)   dextrose 10% bolus 125 mL 125 mL (has no administration in time range)   dextrose 10% bolus 250 mL 250 mL (has no administration in time range)   furosemide injection 40 mg (40 mg Intravenous Given 10/13/23 0949)   cefTRIAXone (ROCEPHIN) 1 g in dextrose 5 % in water (D5W) 100 mL IVPB (MB+) (1 g Intravenous New Bag 10/13/23 6939)     Medical Decision Making  This is a 67-year-old man who is presenting for increasing dyspnea on exertion for 1 week, and increasing ankle swelling.  My differentials include but are not limited to acute decompensation of heart failure, pericardial tamponade, pulmonary embolism, URI, or worsening of valvular abnormalities.  On bedside ultrasound, he has tenderness to deep palpation in the epigastric region.  CTAP ordered. L Heel pain is focal, and extremely sensitive.  He is currently on amiodarone, and has been taking Levaquin, but he is able to plantar flex his left ankle, but unable to dorsiflex.  Left foot x-ray ordered for further investigation.    Chest x-ray concerning for right focal consolidation, started on ceftriaxone and azithromycin for infectious process.  Lung fields showing possible pulmonary edema, IV  Lasix 40 given.    Clinical picture looking at possible pneumonia with pulmonary edema  secondary to acute decompensated heart failure in a patient with worsening renal function on immunosuppressive medications.  Patient accepted for inpatient admission with CTAP pending.    Amount and/or Complexity of Data Reviewed  Labs: ordered.     Details: BNP, troponin, CBC, CMP.  Elevated BNP and troponin, CMP showing stable but poor renal function.  Radiology: ordered.     Details: Chest x-ray: Right lung base consolidation  L foot x-ray: No acute osseous abnormality.  Calcaneal enthesopathy  CT AP  ECG/medicine tests: ordered and independent interpretation performed.     Details: Normal sinur rhythm, VR 64, Left ventricular hypertrophy.    Risk  Prescription drug management.  Decision regarding hospitalization.              Attending Attestation:   Physician Attestation Statement for Resident:  As the supervising MD   Physician Attestation Statement: I have personally seen and examined this patient.   I agree with the above history.  -: 67 year old with multiple cardiac risk factors, immunocompromise p/w DAUGHERTY, chest pain. Concern for CHF vs. PNA. POCUs echo showing no pericardial effusion. Abx, lasix given. Will admit.    Critical care time spent on the evaluation and treatment of severe organ dysfunction, review of pertinent labs and imaging studies, discussions with consulting providers and discussions with patient/family: 35 minutes.     As the supervising MD I agree with the above PE.     As the supervising MD I agree with the above treatment, course, plan, and disposition.                  ED Course as of 10/13/23 1220   Fri Oct 13, 2023   0958 Labs reviewed. CBC stable. CMP with stable creatinine levels. No hyperkalemia. BNP is elevated when compared to prior. Troponin elevated when compared to prior.  [BG]   1002 HEART score is 6.  [BG]      ED Course User Index  [BG] Rudy Coles MD                    Clinical  Impression:   Final diagnoses:  [R06.02] SOB (shortness of breath)  [M79.673] Heel pain  [J18.1] Right lower lobe consolidation (Primary)        ED Disposition Condition    Admit Stable                Ramez Mckeon MD  Resident  10/13/23 1204       Rudy Coles MD  10/13/23 1221

## 2023-10-13 NOTE — SUBJECTIVE & OBJECTIVE
"Past Medical History:   Diagnosis Date    Atrial fibrillation     CAD (coronary artery disease) 2006    MI in 2006    CHF (congestive heart failure) 2017    CKD (chronic kidney disease) stage 3, GFR 30-59 ml/min     Dr. Latif.  in transplanted kidney    COVID-19 2/7/2023    Diverticulosis     Hyperlipidemia     Hypertension     Keloid cicatrix     Metabolic bone disease     Other emphysema 10/1/2019    Pericarditis     S/P kidney transplant 1992    x2 (1992 & 2005),    Thrombocytopenia     Thyroid disease     Tobacco use 09/05/2014       Past Surgical History:   Procedure Laterality Date    CARDIOVERSION  04/30/15    CHOLECYSTECTOMY      COLONOSCOPY  April 20, 2011    Diverticulosis, repeat recommended in 3 yrs., repeat colonoscopy 2014 revealed 2 polyps.  He should return in 5 years.    COLONOSCOPY N/A 3/13/2020    Procedure: COLONOSCOPY;  Surgeon: Chon Casper MD;  Location: [facility] (4TH FLR);  Service: Endoscopy;  Laterality: N/A;  ok to hold coumadin x5days-see telephone encounter 2/4/20-tb    COLONOSCOPY N/A 2/4/2021    Procedure: COLONOSCOPY;  Surgeon: Chon Casper MD;  Location: [facility] (4TH FLR);  Service: Endoscopy;  Laterality: N/A;  Eliquis - per Dr. GAVIN Blunt ok to hold x 2 days and "restarted asap"- ERW  last prep "poor", [id number] ordered/ Pt requests Dr. Casper  prep ins. mailed - COVID screening on 2/1/21 PCW- ERW    COLONOSCOPY N/A 3/3/2021    Procedure: COLONOSCOPY;  Surgeon: Chon Casper MD;  Location: [facility] (4TH FLR);  Service: Endoscopy;  Laterality: N/A;  Patient with his second poor bowel pre and has poor prep today.  If patient not intersted or can't get colonoscopy tomorrow will need constipation bowel prep and will need to restart his Eliquis today.Thanks,Chon     per Dr Blunt-ok to hold Eliquis 2 days prior      COVID     CORONARY ANGIOPLASTY WITH STENT PLACEMENT  9/13/2006    IRRIGATION AND DEBRIDEMENT Right 8/30/2023    Procedure: IRRIGATION AND DEBRIDEMENT, " RIGHT MIDDLE FINGER;  Surgeon: Martin Larsen MD;  Location: [facility];  Service: Orthopedics;  Laterality: Right;    KIDNEY TRANSPLANT  2005    PARATHYROIDECTOMY      TREATMENT OF CARDIAC ARRHYTHMIA N/A 3/28/2022    Procedure: CARDIOVERSION;  Surgeon: Migue Blunt MD;  Location: [facility] EP LAB;  Service: Cardiology;  Laterality: N/A;  AF, DCC, MAC, GP, 3 PREP*RODRIGO deferred pt 100% compliant*       Review of patient's allergies indicates:   Allergen Reactions    Ace inhibitors Swelling    Verapamil Other (See Comments)     Other reaction(s): Unknown    Povidone-iodine Itching       No current facility-administered medications on file prior to encounter.     Current Outpatient Medications on File Prior to Encounter   Medication Sig    amiodarone (PACERONE) 200 MG Tab Take 1 tablet (200 mg total) by mouth once daily.    apixaban (ELIQUIS) 5 mg Tab Take 1 tablet (5 mg total) by mouth 2 (two) times daily.    atorvastatin (LIPITOR) 10 MG tablet TAKE 1 TABLET BY MOUTH EVERY DAY    b complex vitamins (B COMPLEX-VITAMIN B12) tablet Take 1 tablet by mouth once daily.    calcium carbonate (CALCIUM 600 ORAL) Take by mouth. Pt taking 1200 daily    doxazosin (CARDURA) 4 MG tablet Take 1 tablet (4 mg total) by mouth every 12 (twelve) hours.    famotidine (PEPCID) 20 MG tablet Take 1 tablet (20 mg total) by mouth 2 (two) times daily.    metoprolol succinate (TOPROL-XL) 25 MG 24 hr tablet Take 1 tablet (25 mg total) by mouth once daily.    multivitamin (THERAGRAN) per tablet Take 1 tablet by mouth Daily.    NIFEdipine (PROCARDIA-XL) 60 MG (OSM) 24 hr tablet Take 1 tablet (60 mg total) by mouth 2 (two) times a day.    tacrolimus (PROGRAF) 1 MG Cap Take 2 capsules (2 mg total) by mouth every morning AND 2 capsules (2 mg total) every evening.    vitamin D 1000 units Tab Take 370 mg by mouth 2 (two) times daily. 2 tablets BID    acetaminophen (TYLENOL) 500 MG tablet Take 1 tablet (500 mg total) by mouth every 6 (six)  hours as needed for Pain.    albuterol (PROVENTIL) 2.5 mg /3 mL (0.083 %) nebulizer solution Take 3 mLs (2.5 mg total) by nebulization every 6 (six) hours as needed for Wheezing. Rescue    albuterol (VENTOLIN HFA) 90 mcg/actuation inhaler Inhale 2 puffs into the lungs every 6 (six) hours as needed for Wheezing or Shortness of Breath. Rescue    ammonium lactate 12 % Crea Apply 1 g topically once daily.    aspirin (ECOTRIN) 81 MG EC tablet Take 1 tablet by mouth Daily.    fluticasone propion-salmeterol 115-21 mcg/dose (ADVAIR HFA) 115-21 mcg/actuation HFAA inhaler Inhale 2 puffs into the lungs every 12 (twelve) hours. Controller    fluticasone propionate (FLONASE) 50 mcg/actuation nasal spray 1 spray (50 mcg total) by Each Nostril route once daily.    furosemide (LASIX) 40 MG tablet Take 0.5 tablets (20 mg total) by mouth 2 (two) times daily. HOLD this medication on discharge per nephrology recs, and resume only once needed for lower extremity edema.    gabapentin (NEURONTIN) 100 MG capsule Take 1 capsule (100 mg total) by mouth as needed.    hydrALAZINE (APRESOLINE) 50 MG tablet TAKE 1 TABLET (50 MG TOTAL) BY MOUTH 3 (THREE) TIMES DAILY.    MITIGARE 0.6 mg Cap TAKE 1 CAPSULE BY MOUTH TWICE A DAY AS NEEDED GOUT FLARE UP TO 3 DAYS    omeprazole (PRILOSEC) 20 MG capsule Take 1 capsule (20 mg total) by mouth once daily.    oxyCODONE (ROXICODONE) 5 MG immediate release tablet Take 1 tablet (5 mg total) by mouth every 4 (four) hours as needed (pain scale 7-10).    paricalcitoL (ZEMPLAR) 1 MCG capsule TAKE 1 CAPSULE ON MONDAY, WEDNESDAY AND FRIDAY    predniSONE (DELTASONE) 5 MG tablet TAKE 1 TABLET BY MOUTH EVERY DAY IN THE MORNING    pulse oximeter (PULSE OXIMETER) device by Apply Externally route 2 (two) times a day. Use twice daily at 8 AM and 3 PM and record the value in i.Sechart as directed.     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     Patient is currently retired   Substance and Sexual Activity    Alcohol use: No    Drug use: No    Sexual activity: Yes     Partners: Female     Review of Systems   Constitutional:  Positive for fatigue. Negative for fever.   HENT:  Negative for congestion and sore throat.    Eyes: Negative.    Respiratory:  Positive for cough and shortness of breath. Negative for chest tightness and wheezing.    Cardiovascular:  Positive for leg swelling. Negative for chest pain and palpitations.   Gastrointestinal:  Negative for abdominal distention, abdominal pain and vomiting.   Genitourinary:  Negative for difficulty urinating, dysuria and hematuria.   Musculoskeletal:  Negative for arthralgias.        L posterior heel pain   Skin:  Negative for color change and pallor.   Allergic/Immunologic: Positive for immunocompromised state.   Neurological:  Negative for weakness and numbness.   Psychiatric/Behavioral:  Negative for behavioral problems and confusion.      Objective:     Vital Signs (Most Recent):  Temp: 98.3 °F (36.8 °C) (10/13/23 0759)  Pulse: 61 (10/13/23 1220)  Resp: 20 (10/13/23 1220)  BP: (!) 163/74 (10/13/23 1203)  SpO2: (!) 94 % (10/13/23 1220) Vital Signs (24h Range):  Temp:  [98.3 °F (36.8 °C)] 98.3 °F (36.8 °C)  Pulse:  [57-84] 61  Resp:  [20-28] 20  SpO2:  [94 %-100 %] 94 %  BP: (156-169)/(69-74) 163/74     Weight: 95.3 kg (210 lb)  Body mass index is 27.71 kg/m².     Physical Exam  Constitutional:       General: He is in acute  distress.   HENT:      Head: Normocephalic and atraumatic.      Nose: Nose normal.      Mouth/Throat:      Mouth: Mucous membranes are dry.   Eyes:      Extraocular Movements: Extraocular movements intact.      Conjunctiva/sclera: Conjunctivae normal.   Cardiovascular:      Rate and Rhythm: Normal rate and regular rhythm.      Pulses: Normal pulses.      Heart sounds: Normal heart sounds.   Pulmonary:      Effort: Respiratory distress present.      Breath sounds: No wheezing or rales.   Abdominal:      General: Abdomen is flat. Bowel sounds are normal. There is no distension.      Palpations: Abdomen is soft.      Tenderness: There is no abdominal tenderness.   Musculoskeletal:         General: Normal range of motion.      Cervical back: Normal range of motion.      Right lower leg: Edema present.      Left lower leg: Edema present.   Skin:     General: Skin is warm and dry.   Neurological:      General: No focal deficit present.      Mental Status: He is alert and oriented to person, place, and time.   Psychiatric:         Mood and Affect: Mood normal.         Behavior: Behavior normal.         Thought Content: Thought content normal.         Judgment: Judgment normal.                Significant Labs: All pertinent labs within the past 24 hours have been reviewed.    Significant Imaging: I have reviewed all pertinent imaging results/findings within the past 24 hours.

## 2023-10-13 NOTE — ASSESSMENT & PLAN NOTE
Patient admitted with shortness of breath, edema, PND, orthopnea, consistent with acute on chronic diastolic CHF exacerbation.  Last TTE in February of 2023; EF 65% with Grade II Diastolic Dysfunction    BNP: BNP  Recent Labs   Lab 10/13/23  0836   BNP 1,168*     CXR: No significant pleural fluid, evidence of patchy consolidation    Troponin:   Recent Labs   Lab 10/13/23  0836   TROPONINI 0.081*       Home Meds:  Prescribed Lasix 40mg though not taking, states he took 20mg every other day over past week 2/2 swelling    Plan:  - 40 Lasix IV in ED  - Strict ins and outs  - TTE pending   - Continue metoprolol

## 2023-10-13 NOTE — MEDICAL/APP STUDENT
Progress Note  Hospital Medicine    Patient Name: Ibrahima Phelps  YOB: 1955    Admit Date: 10/13/2023                     LOS: 0    SUBJECTIVE:     Reason for Admission: Acute CHF exacerbation, RLL pneumonia  66 yo M active smoker w/ COPD, CAD s/p CABG x1 in 2006 on atorvastatin, paroxysmal Afib on Eliquis, Metoprolol, and Amiodarone, HFpEF off Lasix-40 per his primary nephrologist, hiatal hernia with reflux, HTN on Hydralazine, CKD4 s/p kidney transplants in 1992 and 2005 on Tacrolimus and Prednisone presented to the ED with his wife complaining about worsening shortness of breath; infrequent, nonproductive cough; and severe, sharp L posterior heel pain that started a few days ago. See Resident HPI and ROS for further history.    OBJECTIVE:     Vital Signs Range (Last 24H):  Temp:  [98.3 °F (36.8 °C)]   Pulse:  [61-84]   Resp:  [20-28]   BP: (156-169)/(69-74)   SpO2:  [100 %] Body mass index is 27.71 kg/m².  Wt Readings from Last 3 Encounters:   10/13/23 0759 95.3 kg (210 lb)   10/06/23 1103 95.7 kg (210 lb 15.7 oz)   10/06/23 1024 95.7 kg (210 lb 13.9 oz)     I & O (Last 24H):No intake or output data in the 24 hours ending 10/13/23 1217    Physical Exam:  General: well developed, well nourished, no distress  Neck: JVD - 4 cm above sternal notch and supple, symmetrical, trachea midline, no JVD  Lungs:  clear to auscultation bilaterally and normal respiratory effort  Cardiovascular: Heart: regular rate and rhythm, S1, S2 normal, no murmur, click, rub or gallop. Chest Wall: no tenderness. Extremities: no edema, redness or tenderness in the calves or thighs and no ulcers, gangrene or trophic changes. Pulses: 2+ and symmetric.  Abdomen/Rectal: Abdomen: soft, non-tender non-distented; bowel sounds normal; no masses,  no organomegaly. Rectal: not examined  Skin: Skin color, texture, turgor normal. No rashes or lesions  Musculoskeletal:no clubbing, cyanosis  Neurologic: Normal strength and tone. No focal  numbness or weakness Exquisite L posterior heel tenderness to brushing and palpation, ROM not assessed due to exquisite pain.    Diagnostic Results:  Lab Results   Component Value Date    WBC 8.55 10/13/2023    HGB 8.2 (L) 10/13/2023    HCT 26.7 (L) 10/13/2023    MCV 82 10/13/2023     (L) 10/13/2023     Recent Labs   Lab 10/13/23  0836   GLU 97      K 4.1   *   CO2 19*   BUN 33*   CREATININE 4.1*   CALCIUM 8.2*   MG 2.0     Lab Results   Component Value Date    INR 1.1 07/13/2023    INR 1.2 02/07/2023    INR 1.1 03/21/2022     Lab Results   Component Value Date    HGBA1C 5.5 01/04/2023     ASSESSMENT/PLAN:   68 yo M w/ acute CHF exacerbation on Lasix and RLL pneumonia on CTX and AZT, hemodynamically stable    Acute CHF exacerbation  Hemodynamically stable, without increased O2 requirements  BNP and exam findings of CHF exacerbation  Start Lasix 40mg  Start fluid restriction <2L/dy  Start low salt diet  Continue home medications    RLL pneumonia  Hemodynamically stable, without increased O2 requirements  At-risk of developing severe pneumonia due to immunosuppressives and COPD  Continue CTX and AZT  SLP consulted for possible aspiration etiology    CAD s/p CABG x1  Hemodynamically stable, without increased O2 requirements  Troponin 0.081, trending troponins  Start EKG  Continue home medications    CKD4 s/p kidney transplant x2  Stable  Cr baseline 2.5-2.9  KTM consulted for further evaluation of transplant status and immunosuppressives    Severe L posterior heel pain, likely Achilles tenosynovitis   XR ankle negative for acute osseous or infective changes  Consider adverse drug effect (on atorvastatin, tacrolimus, and prednisone)    Afib  Stable  Start home antiarrhythmics    HTN  SBP ~170  Start home antihypertensives    COPD  Stable  At-risk of developing severe pneumonia  Continue home medications    Active smoker  Smokes 3 cigarettes per day  Smoking cessation was discussed    High Risk  Conditions:  Patient has a condition that poses threat to life and bodily function: Acute heart failure exacerbation, RLL pneumonia, s/p kidney transplant x2    Problems Addressed Today: Acute CHF exacerbation, RLL pneumonia, severe L posterior heel pain, CAD s/p CABG x1, CKD4 s/p kidney transplant x2, Afib, HTN, COPD active smoker    DISCHARGE PLANNING: Once acute heart failure exacerbation and RLL pneumonia are resolved.     TIME SPENT: I spent 30 minutes evaluating, treating, counseling, and coordinating care for the patient.    Signing Physician:  Ema Nix MS3

## 2023-10-13 NOTE — ASSESSMENT & PLAN NOTE
Kidney transplant in 1996 and 2003  Cr elevated to 4.1 on admission, around patients baseline     - Continue home tacrolimus and prednisone  - Obtain tacro level  - KTM consulted

## 2023-10-14 PROBLEM — I20.0 UNSTABLE ANGINA PECTORIS: Status: ACTIVE | Noted: 2023-10-14

## 2023-10-14 LAB
ALBUMIN SERPL BCP-MCNC: 2.6 G/DL (ref 3.5–5.2)
ALP SERPL-CCNC: 60 U/L (ref 55–135)
ALT SERPL W/O P-5'-P-CCNC: 14 U/L (ref 10–44)
ANION GAP SERPL CALC-SCNC: 13 MMOL/L (ref 8–16)
ANION GAP SERPL CALC-SCNC: 15 MMOL/L (ref 8–16)
AST SERPL-CCNC: 17 U/L (ref 10–40)
BASOPHILS # BLD AUTO: 0.01 K/UL (ref 0–0.2)
BASOPHILS # BLD AUTO: 0.01 K/UL (ref 0–0.2)
BASOPHILS NFR BLD: 0.1 % (ref 0–1.9)
BASOPHILS NFR BLD: 0.1 % (ref 0–1.9)
BILIRUB SERPL-MCNC: 0.3 MG/DL (ref 0.1–1)
BUN SERPL-MCNC: 32 MG/DL (ref 8–23)
BUN SERPL-MCNC: 36 MG/DL (ref 8–23)
CALCIUM SERPL-MCNC: 7.7 MG/DL (ref 8.7–10.5)
CALCIUM SERPL-MCNC: 7.7 MG/DL (ref 8.7–10.5)
CHLORIDE SERPL-SCNC: 108 MMOL/L (ref 95–110)
CHLORIDE SERPL-SCNC: 108 MMOL/L (ref 95–110)
CO2 SERPL-SCNC: 19 MMOL/L (ref 23–29)
CO2 SERPL-SCNC: 20 MMOL/L (ref 23–29)
CREAT SERPL-MCNC: 4.2 MG/DL (ref 0.5–1.4)
CREAT SERPL-MCNC: 4.4 MG/DL (ref 0.5–1.4)
DIFFERENTIAL METHOD: ABNORMAL
DIFFERENTIAL METHOD: ABNORMAL
EOSINOPHIL # BLD AUTO: 0.1 K/UL (ref 0–0.5)
EOSINOPHIL # BLD AUTO: 0.4 K/UL (ref 0–0.5)
EOSINOPHIL NFR BLD: 1.6 % (ref 0–8)
EOSINOPHIL NFR BLD: 5.7 % (ref 0–8)
ERYTHROCYTE [DISTWIDTH] IN BLOOD BY AUTOMATED COUNT: 18.5 % (ref 11.5–14.5)
ERYTHROCYTE [DISTWIDTH] IN BLOOD BY AUTOMATED COUNT: 18.5 % (ref 11.5–14.5)
EST. GFR  (NO RACE VARIABLE): 13.9 ML/MIN/1.73 M^2
EST. GFR  (NO RACE VARIABLE): 14.6 ML/MIN/1.73 M^2
GLUCOSE SERPL-MCNC: 104 MG/DL (ref 70–110)
GLUCOSE SERPL-MCNC: 81 MG/DL (ref 70–110)
HCT VFR BLD AUTO: 22.2 % (ref 40–54)
HCT VFR BLD AUTO: 22.7 % (ref 40–54)
HGB BLD-MCNC: 7.1 G/DL (ref 14–18)
HGB BLD-MCNC: 7.2 G/DL (ref 14–18)
IMM GRANULOCYTES # BLD AUTO: 0.02 K/UL (ref 0–0.04)
IMM GRANULOCYTES # BLD AUTO: 0.02 K/UL (ref 0–0.04)
IMM GRANULOCYTES NFR BLD AUTO: 0.3 % (ref 0–0.5)
IMM GRANULOCYTES NFR BLD AUTO: 0.3 % (ref 0–0.5)
LYMPHOCYTES # BLD AUTO: 0.9 K/UL (ref 1–4.8)
LYMPHOCYTES # BLD AUTO: 1.7 K/UL (ref 1–4.8)
LYMPHOCYTES NFR BLD: 11.9 % (ref 18–48)
LYMPHOCYTES NFR BLD: 23.8 % (ref 18–48)
MAGNESIUM SERPL-MCNC: 1.9 MG/DL (ref 1.6–2.6)
MAGNESIUM SERPL-MCNC: 1.9 MG/DL (ref 1.6–2.6)
MCH RBC QN AUTO: 25.1 PG (ref 27–31)
MCH RBC QN AUTO: 25.1 PG (ref 27–31)
MCHC RBC AUTO-ENTMCNC: 31.7 G/DL (ref 32–36)
MCHC RBC AUTO-ENTMCNC: 32 G/DL (ref 32–36)
MCV RBC AUTO: 78 FL (ref 82–98)
MCV RBC AUTO: 79 FL (ref 82–98)
MONOCYTES # BLD AUTO: 0.6 K/UL (ref 0.3–1)
MONOCYTES # BLD AUTO: 0.7 K/UL (ref 0.3–1)
MONOCYTES NFR BLD: 7.8 % (ref 4–15)
MONOCYTES NFR BLD: 9.7 % (ref 4–15)
NEUTROPHILS # BLD AUTO: 4.2 K/UL (ref 1.8–7.7)
NEUTROPHILS # BLD AUTO: 5.8 K/UL (ref 1.8–7.7)
NEUTROPHILS NFR BLD: 60.4 % (ref 38–73)
NEUTROPHILS NFR BLD: 78.3 % (ref 38–73)
NRBC BLD-RTO: 0 /100 WBC
NRBC BLD-RTO: 0 /100 WBC
PHOSPHATE SERPL-MCNC: 5.5 MG/DL (ref 2.7–4.5)
PLATELET # BLD AUTO: 139 K/UL (ref 150–450)
PLATELET # BLD AUTO: 164 K/UL (ref 150–450)
PMV BLD AUTO: 12.1 FL (ref 9.2–12.9)
PMV BLD AUTO: 13 FL (ref 9.2–12.9)
POTASSIUM SERPL-SCNC: 3.3 MMOL/L (ref 3.5–5.1)
POTASSIUM SERPL-SCNC: 3.8 MMOL/L (ref 3.5–5.1)
PROT SERPL-MCNC: 5.9 G/DL (ref 6–8.4)
RBC # BLD AUTO: 2.83 M/UL (ref 4.6–6.2)
RBC # BLD AUTO: 2.87 M/UL (ref 4.6–6.2)
SODIUM SERPL-SCNC: 141 MMOL/L (ref 136–145)
SODIUM SERPL-SCNC: 142 MMOL/L (ref 136–145)
TACROLIMUS BLD-MCNC: 2.1 NG/ML (ref 5–15)
TROPONIN I SERPL DL<=0.01 NG/ML-MCNC: 0.05 NG/ML (ref 0–0.03)
TROPONIN I SERPL DL<=0.01 NG/ML-MCNC: 0.06 NG/ML (ref 0–0.03)
TROPONIN I SERPL DL<=0.01 NG/ML-MCNC: 0.06 NG/ML (ref 0–0.03)
WBC # BLD AUTO: 7.02 K/UL (ref 3.9–12.7)
WBC # BLD AUTO: 7.39 K/UL (ref 3.9–12.7)

## 2023-10-14 PROCEDURE — 84100 ASSAY OF PHOSPHORUS: CPT

## 2023-10-14 PROCEDURE — 83735 ASSAY OF MAGNESIUM: CPT

## 2023-10-14 PROCEDURE — 80048 BASIC METABOLIC PNL TOTAL CA: CPT | Mod: XB | Performed by: STUDENT IN AN ORGANIZED HEALTH CARE EDUCATION/TRAINING PROGRAM

## 2023-10-14 PROCEDURE — 84484 ASSAY OF TROPONIN QUANT: CPT

## 2023-10-14 PROCEDURE — 25000003 PHARM REV CODE 250

## 2023-10-14 PROCEDURE — 93010 ELECTROCARDIOGRAM REPORT: CPT | Mod: ,,, | Performed by: INTERNAL MEDICINE

## 2023-10-14 PROCEDURE — 36415 COLL VENOUS BLD VENIPUNCTURE: CPT | Performed by: STUDENT IN AN ORGANIZED HEALTH CARE EDUCATION/TRAINING PROGRAM

## 2023-10-14 PROCEDURE — 93005 ELECTROCARDIOGRAM TRACING: CPT

## 2023-10-14 PROCEDURE — 63600175 PHARM REV CODE 636 W HCPCS

## 2023-10-14 PROCEDURE — 94640 AIRWAY INHALATION TREATMENT: CPT

## 2023-10-14 PROCEDURE — 99900035 HC TECH TIME PER 15 MIN (STAT)

## 2023-10-14 PROCEDURE — 93010 EKG 12-LEAD: ICD-10-PCS | Mod: 76,,, | Performed by: INTERNAL MEDICINE

## 2023-10-14 PROCEDURE — 99223 PR INITIAL HOSPITAL CARE,LEVL III: ICD-10-PCS | Mod: GC,,, | Performed by: STUDENT IN AN ORGANIZED HEALTH CARE EDUCATION/TRAINING PROGRAM

## 2023-10-14 PROCEDURE — 25000242 PHARM REV CODE 250 ALT 637 W/ HCPCS

## 2023-10-14 PROCEDURE — 27000221 HC OXYGEN, UP TO 24 HOURS

## 2023-10-14 PROCEDURE — 99223 PR INITIAL HOSPITAL CARE,LEVL III: ICD-10-PCS | Mod: ,,, | Performed by: INTERNAL MEDICINE

## 2023-10-14 PROCEDURE — 25000242 PHARM REV CODE 250 ALT 637 W/ HCPCS: Performed by: STUDENT IN AN ORGANIZED HEALTH CARE EDUCATION/TRAINING PROGRAM

## 2023-10-14 PROCEDURE — 83735 ASSAY OF MAGNESIUM: CPT | Mod: 91 | Performed by: STUDENT IN AN ORGANIZED HEALTH CARE EDUCATION/TRAINING PROGRAM

## 2023-10-14 PROCEDURE — 93010 ELECTROCARDIOGRAM REPORT: CPT | Mod: 76,,, | Performed by: INTERNAL MEDICINE

## 2023-10-14 PROCEDURE — 99223 1ST HOSP IP/OBS HIGH 75: CPT | Mod: GC,,, | Performed by: STUDENT IN AN ORGANIZED HEALTH CARE EDUCATION/TRAINING PROGRAM

## 2023-10-14 PROCEDURE — 20600001 HC STEP DOWN PRIVATE ROOM

## 2023-10-14 PROCEDURE — 99223 1ST HOSP IP/OBS HIGH 75: CPT | Mod: ,,, | Performed by: INTERNAL MEDICINE

## 2023-10-14 PROCEDURE — 80053 COMPREHEN METABOLIC PANEL: CPT

## 2023-10-14 PROCEDURE — 94761 N-INVAS EAR/PLS OXIMETRY MLT: CPT

## 2023-10-14 PROCEDURE — 63700000 PHARM REV CODE 250 ALT 637 W/O HCPCS

## 2023-10-14 PROCEDURE — 85025 COMPLETE CBC W/AUTO DIFF WBC: CPT | Mod: 91 | Performed by: STUDENT IN AN ORGANIZED HEALTH CARE EDUCATION/TRAINING PROGRAM

## 2023-10-14 PROCEDURE — 85025 COMPLETE CBC W/AUTO DIFF WBC: CPT

## 2023-10-14 PROCEDURE — 93010 EKG 12-LEAD: ICD-10-PCS | Mod: ,,, | Performed by: INTERNAL MEDICINE

## 2023-10-14 PROCEDURE — 36415 COLL VENOUS BLD VENIPUNCTURE: CPT

## 2023-10-14 RX ORDER — ALUMINUM HYDROXIDE, MAGNESIUM HYDROXIDE, AND SIMETHICONE 2400; 240; 2400 MG/30ML; MG/30ML; MG/30ML
30 SUSPENSION ORAL EVERY 6 HOURS PRN
Status: DISCONTINUED | OUTPATIENT
Start: 2023-10-14 | End: 2023-10-14

## 2023-10-14 RX ORDER — NITROGLYCERIN 0.4 MG/1
0.4 TABLET SUBLINGUAL EVERY 5 MIN PRN
Status: DISCONTINUED | OUTPATIENT
Start: 2023-10-14 | End: 2023-10-20 | Stop reason: HOSPADM

## 2023-10-14 RX ORDER — FUROSEMIDE 10 MG/ML
60 INJECTION INTRAMUSCULAR; INTRAVENOUS
Status: DISCONTINUED | OUTPATIENT
Start: 2023-10-14 | End: 2023-10-15

## 2023-10-14 RX ORDER — NITROGLYCERIN 0.4 MG/1
TABLET SUBLINGUAL
Status: DISPENSED
Start: 2023-10-14 | End: 2023-10-14

## 2023-10-14 RX ORDER — ALUMINUM HYDROXIDE, MAGNESIUM HYDROXIDE, AND SIMETHICONE 2400; 240; 2400 MG/30ML; MG/30ML; MG/30ML
30 SUSPENSION ORAL EVERY 6 HOURS
Status: DISCONTINUED | OUTPATIENT
Start: 2023-10-14 | End: 2023-10-18

## 2023-10-14 RX ADMIN — AZITHROMYCIN 500 MG: 250 TABLET, FILM COATED ORAL at 08:10

## 2023-10-14 RX ADMIN — FUROSEMIDE 40 MG: 10 INJECTION, SOLUTION INTRAVENOUS at 12:10

## 2023-10-14 RX ADMIN — ALBUTEROL SULFATE 2.5 MG: 2.5 SOLUTION RESPIRATORY (INHALATION) at 08:10

## 2023-10-14 RX ADMIN — ATORVASTATIN CALCIUM 10 MG: 10 TABLET, FILM COATED ORAL at 08:10

## 2023-10-14 RX ADMIN — NITROGLYCERIN 0.4 MG: 0.4 TABLET, ORALLY DISINTEGRATING SUBLINGUAL at 02:10

## 2023-10-14 RX ADMIN — TACROLIMUS 2 MG: 1 CAPSULE ORAL at 06:10

## 2023-10-14 RX ADMIN — Medication 6 MG: at 09:10

## 2023-10-14 RX ADMIN — PREDNISONE 5 MG: 5 TABLET ORAL at 08:10

## 2023-10-14 RX ADMIN — ALUMINUM HYDROXIDE, MAGNESIUM HYDROXIDE, AND DIMETHICONE 30 ML: 400; 400; 40 SUSPENSION ORAL at 06:10

## 2023-10-14 RX ADMIN — OXYMETAZOLINE HYDROCHLORIDE 2 SPRAY: 0.05 SPRAY NASAL at 09:10

## 2023-10-14 RX ADMIN — APIXABAN 5 MG: 5 TABLET, FILM COATED ORAL at 08:10

## 2023-10-14 RX ADMIN — APIXABAN 5 MG: 5 TABLET, FILM COATED ORAL at 09:10

## 2023-10-14 RX ADMIN — TACROLIMUS 2 MG: 1 CAPSULE ORAL at 08:10

## 2023-10-14 RX ADMIN — METOPROLOL SUCCINATE 12.5 MG: 25 TABLET, EXTENDED RELEASE ORAL at 08:10

## 2023-10-14 RX ADMIN — NITROGLYCERIN 0.4 MG: 0.4 TABLET, ORALLY DISINTEGRATING SUBLINGUAL at 09:10

## 2023-10-14 RX ADMIN — FUROSEMIDE 60 MG: 10 INJECTION, SOLUTION INTRAMUSCULAR; INTRAVENOUS at 12:10

## 2023-10-14 RX ADMIN — OXYMETAZOLINE HYDROCHLORIDE 2 SPRAY: 0.05 SPRAY NASAL at 08:10

## 2023-10-14 RX ADMIN — AMIODARONE HYDROCHLORIDE 200 MG: 200 TABLET ORAL at 08:10

## 2023-10-14 RX ADMIN — POTASSIUM BICARBONATE 40 MEQ: 391 TABLET, EFFERVESCENT ORAL at 09:10

## 2023-10-14 RX ADMIN — POTASSIUM BICARBONATE 40 MEQ: 391 TABLET, EFFERVESCENT ORAL at 08:10

## 2023-10-14 RX ADMIN — ALBUTEROL SULFATE 2.5 MG: 2.5 SOLUTION RESPIRATORY (INHALATION) at 05:10

## 2023-10-14 RX ADMIN — FAMOTIDINE 20 MG: 20 TABLET ORAL at 08:10

## 2023-10-14 RX ADMIN — ALBUTEROL SULFATE 2.5 MG: 2.5 SOLUTION RESPIRATORY (INHALATION) at 01:10

## 2023-10-14 NOTE — PROGRESS NOTES
"[facility] - Telemetry [facility] Medicine  Progress Note    Patient Name: Ibrahima Phelps  MRN: [id number]  Patient Class: IP- Inpatient   Admission Date: 10/13/2023  Length of Stay: 1 days  Attending Physician: Ricky Judge MD  Primary Care Provider: Anatoliy Colindres MD        Subjective:     Principal Problem:Acute on chronic heart failure with preserved ejection fraction        HPI:  66 yo M active smoker w/ COPD, CAD s/p CABG x1 in 2006, paroxysmal Afib s/p cardioversion 3/2022 (on Eliquis, Metoprolol, and Amiodarone), HFpEF (EF 65%; not currently taking diuretics), hiatal hernia with reflux, HTN on Hydralazine, CKD4 s/p kidney transplants in 1992 and 2005 on Tacrolimus and Prednisone, presented to the ED with his wife complaining about worsening shortness of breath/DAUGHERTY; infrequent, nonproductive cough; and severe, sharp L posterior heel pain that started a few days ago. Per the patient, he woke up last night "gasping for air," short of breath, and with substernal chest pain radiating to his back. The chest pain resolved with Omeprazole; denies chest pain at this time. The shortness of breath and infrequent, nonproductive cough typically occur at rest and are worse with walking. The severe, sharp L posterior heel pain is worse with movement and walking, and has not responded to leg elevation. Of note, he has a history of tenosynovitis. He noted significant, watery diarrhea associated with stomach pain all day yesterday that resolved with Peptobismol and water; denies diarrhea this morning. Denies fevers, chills, nausea, vomiting, constipation, dysuria, hematuria, palpitations, headache, or vision changes.     His presenting vital signs were a temperature of 98.3, pulse rate of 57-84 bpm, resp rate of 20-28, blood pressure ranging from 156-169/69-74, MAP of 104-107, and SpO2 % on RA. Troponin (0.081), Magnesium (nml), CBC (Hgb 8.2, platelets 127), CMP (CO2 19, BUN 33, Cr 4.1), and BNP (1,168) were " ordered. CXR showed RLL consolidation. XR L ankle showed calcaneal enthesopathy. CTAP showed trace bilateral pleural effusions, bibasilar dependent pulmonary opacities, new prominence/thickening of the L iliac fossa transplant kidney's ureter. Started on CTX, AZT, and Lasix      Overview/Hospital Course:  No notes on file    Interval History:    Increase lasix to 60 IV BID. Continue CAP coverage. Monitoring labile Bps. Patiently doing well today    Review of Systems   Constitutional:  Positive for fatigue. Negative for fever.   HENT:  Negative for congestion and sore throat.    Eyes: Negative.    Respiratory:  Positive for cough and shortness of breath. Negative for chest tightness and wheezing.    Cardiovascular:  Positive for leg swelling. Negative for chest pain and palpitations.   Gastrointestinal:  Negative for abdominal distention, abdominal pain and vomiting.   Genitourinary:  Negative for difficulty urinating, dysuria and hematuria.   Musculoskeletal:  Negative for arthralgias.        L posterior heel pain   Skin:  Negative for color change and pallor.   Allergic/Immunologic: Positive for immunocompromised state.   Neurological:  Negative for weakness and numbness.   Psychiatric/Behavioral:  Negative for behavioral problems and confusion.      Objective:     Vital Signs (Most Recent):  Temp: 97.9 °F (36.6 °C) (10/14/23 1133)  Pulse: 62 (10/14/23 1144)  Resp: 20 (10/14/23 1133)  BP: (!) 117/59 (10/14/23 1133)  SpO2: 100 % (10/14/23 1133) Vital Signs (24h Range):  Temp:  [97.8 °F (36.6 °C)-98.7 °F (37.1 °C)] 97.9 °F (36.6 °C)  Pulse:  [58-89] 62  Resp:  [15-20] 20  SpO2:  [97 %-100 %] 100 %  BP: (117-189)/(59-81) 117/59     Weight: 94.2 kg (207 lb 10.8 oz)  Body mass index is 27.4 kg/m².     Physical Exam  Constitutional:       General: He is in acute distress.   HENT:      Head: Normocephalic and atraumatic.      Nose: Nose normal.      Mouth/Throat:      Mouth: Mucous membranes are dry.   Eyes:       Extraocular Movements: Extraocular movements intact.      Conjunctiva/sclera: Conjunctivae normal.   Cardiovascular:      Rate and Rhythm: Normal rate and regular rhythm.      Pulses: Normal pulses.      Heart sounds: Normal heart sounds.   Pulmonary:      Effort: Respiratory distress present.      Breath sounds: No wheezing or rales.   Abdominal:      General: Abdomen is flat. Bowel sounds are normal. There is no distension.      Palpations: Abdomen is soft.      Tenderness: There is no abdominal tenderness.   Musculoskeletal:         General: Normal range of motion.      Cervical back: Normal range of motion.      Right lower leg: Edema present.      Left lower leg: Edema present.   Skin:     General: Skin is warm and dry.   Neurological:      General: No focal deficit present.      Mental Status: He is alert and oriented to person, place, and time.   Psychiatric:         Mood and Affect: Mood normal.         Behavior: Behavior normal.         Thought Content: Thought content normal.         Judgment: Judgment normal.                Significant Labs: All pertinent labs within the past 24 hours have been reviewed.    Significant Imaging: I have reviewed all pertinent imaging results/findings within the past 24 hours.      Assessment/Plan:      Consolidation lung  Patchy consolidation observed on CXR in ED    - CT Chest WO Contrast ordered, could not r/o consolidation   - Pt started on CAP coverage      CKD (chronic kidney disease), stage IV  Cr. Today was 4.1, baseline around 3.7-4. Managed by nephrology outpatient    - avoid nephrotoxic drugs and renally dose medications  - strict I/Os      Anemia in stage 4 chronic kidney disease  Hgb 8.2 on admit, around baseline    Lab Results   Component Value Date    IRON 54 02/10/2023    TIBC 142 (L) 02/10/2023    FERRITIN 1,911 (H) 02/20/2023     Lab Results   Component Value Date    FOLATE 8.8 02/28/2023     Lab Results   Component Value Date    [id number] 278 06/20/2023  "    No results found for: "RETICCTPCT"    Likely secondary to CKD    Plan:   -   Lab Results   Component Value Date    HGB 7.1 (L) 10/14/2023     - Daily CBC   - f/u iron studies, vitamin B12, and folate  - Transfuse if Hgb < 7       Chronic heart failure with preserved ejection fraction  Patient admitted with shortness of breath, edema, PND, orthopnea, consistent with acute on chronic diastolic CHF exacerbation.  Last TTE in February of 2023; EF 65% with Grade II Diastolic Dysfunction    BNP: BNP  Recent Labs   Lab 10/13/23  0836   BNP 1,168*     CXR: No significant pleural fluid, evidence of patchy consolidation    Troponin:   Recent Labs   Lab 10/14/23  1025   TROPONINI 0.058*  0.058*       Home Meds:  Prescribed Lasix 40mg though not taking, states he took 20mg every other day over past week 2/2 swelling    Plan:  - 60 Lasix IV BID  - Strict ins and outs  - TTE pending   - Continue metoprolol      Immunodeficiency due to treatment with immunosuppressive medication  See H/o Kidney transplant    Chronic obstructive pulmonary disease  Pt complaining of cough and shortness of breath, duration 3 days  Saturating appropriately on room air, no wheezing or abnormal breath sounds on auscultation     - home duonebs      Paroxysmal atrial fibrillation  Patient with Long standing persistent (>12 months) atrial fibrillation which is controlled currently with Beta Blocker and Amiodarone. Patient is currently in sinus rhythm.[id number] Score: 2. . Anticoagulation indicated. Anticoagulation done with apixaban.  S/p cardioversion in March of 2022    - Continue amio, metoprolol, and apixaban  - monitor on telemetry    H/O kidney transplant  Kidney transplant in 1996 and 2003  Cr elevated to 4.1 on admission, around patients baseline     - Continue home tacrolimus and prednisone  - Tacro level low  - KTM consulted      Long term (current) use of anticoagulants [V58.61]  Continue apixaban      Mixed hyperlipidemia  Chronic and " stable    - continue statin      CAD (coronary artery disease)  CABG in 2005    - Continue ASA and Stating  - Slight troponin elevation on admission, will trend      VTE Risk Mitigation (From admission, onward)           Ordered     apixaban tablet 5 mg  2 times daily         10/13/23 1127                    Discharge Planning   RAMU:      Code Status: Full Code   Is the patient medically ready for discharge?:     Reason for patient still in hospital (select all that apply): Patient trending condition               Evan Porras MD  Department of Hospital Medicine   [facility] - Telemetry Stepdown

## 2023-10-14 NOTE — ASSESSMENT & PLAN NOTE
Patient with Long standing persistent (>12 months) atrial fibrillation which is controlled currently with Beta Blocker and Amiodarone. Patient is currently in sinus rhythm.[id number] Score: 2. . Anticoagulation indicated. Anticoagulation done with apixaban.  S/p cardioversion in March of 2022    - Continue amio, metoprolol, and apixaban  - monitor on telemetry

## 2023-10-14 NOTE — SUBJECTIVE & OBJECTIVE
Interval History:    Increase lasix to 60 IV BID. Continue CAP coverage. Monitoring labile Bps. Patiently doing well today    Review of Systems   Constitutional:  Positive for fatigue. Negative for fever.   HENT:  Negative for congestion and sore throat.    Eyes: Negative.    Respiratory:  Positive for cough and shortness of breath. Negative for chest tightness and wheezing.    Cardiovascular:  Positive for leg swelling. Negative for chest pain and palpitations.   Gastrointestinal:  Negative for abdominal distention, abdominal pain and vomiting.   Genitourinary:  Negative for difficulty urinating, dysuria and hematuria.   Musculoskeletal:  Negative for arthralgias.        L posterior heel pain   Skin:  Negative for color change and pallor.   Allergic/Immunologic: Positive for immunocompromised state.   Neurological:  Negative for weakness and numbness.   Psychiatric/Behavioral:  Negative for behavioral problems and confusion.      Objective:     Vital Signs (Most Recent):  Temp: 97.9 °F (36.6 °C) (10/14/23 1133)  Pulse: 62 (10/14/23 1144)  Resp: 20 (10/14/23 1133)  BP: (!) 117/59 (10/14/23 1133)  SpO2: 100 % (10/14/23 1133) Vital Signs (24h Range):  Temp:  [97.8 °F (36.6 °C)-98.7 °F (37.1 °C)] 97.9 °F (36.6 °C)  Pulse:  [58-89] 62  Resp:  [15-20] 20  SpO2:  [97 %-100 %] 100 %  BP: (117-189)/(59-81) 117/59     Weight: 94.2 kg (207 lb 10.8 oz)  Body mass index is 27.4 kg/m².     Physical Exam  Constitutional:       General: He is in acute distress.   HENT:      Head: Normocephalic and atraumatic.      Nose: Nose normal.      Mouth/Throat:      Mouth: Mucous membranes are dry.   Eyes:      Extraocular Movements: Extraocular movements intact.      Conjunctiva/sclera: Conjunctivae normal.   Cardiovascular:      Rate and Rhythm: Normal rate and regular rhythm.      Pulses: Normal pulses.      Heart sounds: Normal heart sounds.   Pulmonary:      Effort: Respiratory distress present.      Breath sounds: No wheezing or  rales.   Abdominal:      General: Abdomen is flat. Bowel sounds are normal. There is no distension.      Palpations: Abdomen is soft.      Tenderness: There is no abdominal tenderness.   Musculoskeletal:         General: Normal range of motion.      Cervical back: Normal range of motion.      Right lower leg: Edema present.      Left lower leg: Edema present.   Skin:     General: Skin is warm and dry.   Neurological:      General: No focal deficit present.      Mental Status: He is alert and oriented to person, place, and time.   Psychiatric:         Mood and Affect: Mood normal.         Behavior: Behavior normal.         Thought Content: Thought content normal.         Judgment: Judgment normal.                Significant Labs: All pertinent labs within the past 24 hours have been reviewed.    Significant Imaging: I have reviewed all pertinent imaging results/findings within the past 24 hours.

## 2023-10-14 NOTE — CARE UPDATE
Patient : Bipin Levine Age: 13 month old Sex: male   MRN: [id number] Encounter Date: 10/17/2017      History     Chief Complaint   Patient presents with   • Fever     Patient presents with fever. Patient has not been feeling well at home. No vomiting. No change here. No cough. Parents were concerned the Patient felt warmer brought to the ER.            No Known Allergies    Prior to Admission Medications    No medications on file       New Prescriptions    No medications on file       Past Medical History:   Diagnosis Date   • LGA (large for gestational age) infant 2016   • Meconium aspiration 2016   •  difficulty in feeding at breast 2016   • Thrombocytopenia (CMS/HCC) 2016       Past Surgical History:   Procedure Laterality Date   • CIRCUMCISION, PRIMARY         Family History   Problem Relation Age of Onset   • Asthma Mother    • Depression Mother    • Learning disabilities Mother    • Depression Father    • Allergies Maternal Grandmother    • Asthma Maternal Grandmother    • Bleeding Problems Maternal Grandmother    • Depression Maternal Grandmother    • High blood pressure Maternal Grandmother    • Learning disabilities Maternal Grandmother    • Vision loss Maternal Grandmother    • Arthritis Maternal Grandmother    • Asthma Maternal Grandfather    • Depression Maternal Grandfather    • Learning disabilities Maternal Grandfather    • Arthritis Maternal Grandfather    • Asthma Paternal Grandmother    • Arthritis Paternal Grandmother    • Allergies Paternal Grandmother    • Allergies Paternal Grandfather    • Hearing loss Paternal Grandfather        Social History   Substance Use Topics   • Smoking status: Not on file   • Smokeless tobacco: Not on file   • Alcohol use Not on file       Review of Systems   Constitutional: Positive for fever.   HENT: Negative.    Eyes: Negative.    Respiratory: Negative.    Cardiovascular: Negative.    Gastrointestinal: Negative.    Endocrine: Negative.   RAPID RESPONSE NURSE PROACTIVE ROUNDING NOTE       Time of Visit: 1700    Admit Date: 10/13/2023  LOS: 1  Code Status: Full Code   Date of Visit: 10/14/2023  : 1955  Age: 67 y.o.  Sex: male  Race: Black or   Bed: 8095/8095 A:   MRN: [id number]  Was the patient discharged from an ICU this admission? No   Was the patient discharged from a PACU within last 24 hours? No   Did the patient receive conscious sedation/general anesthesia in last 24 hours? No  Was the patient in the ED within the past 24 hours? Yes  Was the patient on NIPPV within the past 24 hours? No   Attending Physician: Ricky Judge MD  Primary Service: [facility] 5   Time spent at the bedside: 15 -30 min    SITUATION    Notified by charge RN via phone call.  Reason for alert: Chest pain  Called to evaluate the patient for Circulatory    BACKGROUND     Why is the patient in the hospital?: <principal problem not specified>    Patient has a past medical history of Atrial fibrillation, CAD (coronary artery disease), CHF (congestive heart failure), CKD (chronic kidney disease) stage 3, GFR 30-59 ml/min, COVID-19, Diverticulosis, Hyperlipidemia, Hypertension, Keloid cicatrix, Metabolic bone disease, Other emphysema, Pericarditis, S/P kidney transplant, Thrombocytopenia, Thyroid disease, and Tobacco use.    Last Vitals:  Temp: 98.1 °F (36.7 °C) (10/14 153)  Pulse: 69 (10/14 1714)  Resp: 18 (10/14 1714)  BP: 156/65 (10/14 1535)  SpO2: 100 % (10/14 1714)    24 Hours Vitals Range:  Temp:  [97.8 °F (36.6 °C)-98.7 °F (37.1 °C)]   Pulse:  [58-89]   Resp:  [15-48]   BP: (117-189)/()   SpO2:  [97 %-100 %]     Labs:  Recent Labs     10/13/23  0836 10/14/23  0522   WBC 8.55 7.02   HGB 8.2* 7.1*   HCT 26.7* 22.2*   * 139*       Recent Labs     10/13/23  0836 10/14/23  0522    141   K 4.1 3.3*   * 108   CO2 19* 20*   BUN 33* 36*   CREATININE 4.1* 4.2*   GLU 97 81   PHOS  --  5.5*   MG 2.0 1.9        No results for    Genitourinary: Negative.    Musculoskeletal: Negative.    Skin: Negative.    Allergic/Immunologic: Negative.    Neurological: Negative.    Hematological: Negative.    Psychiatric/Behavioral: Negative.    All other systems reviewed and are negative.      Physical Exam     ED Triage Vitals [10/17/17 0236]   ED Triage Vitals Group      Temp (!) 104.4 °F (40.2 °C)      Heart Rate 165      Resp 28      BP       SpO2 99 %      EtCO2 mmHg       Height       Weight 25 lb 9.2 oz (11.6 kg)      Weight Scale Used        Physical Exam   Constitutional: He appears well-developed and well-nourished. He is active.   HENT:   Head: Atraumatic.   Left Ear: Tympanic membrane normal.   Nose: Nose normal.   Mouth/Throat: Mucous membranes are moist. Dentition is normal. Oropharynx is clear.   Right TM erythema. No mastoid tenderness or swelling.   Eyes: Conjunctivae are normal. Pupils are equal, round, and reactive to light.   Neck: Normal range of motion. Neck supple.   Cardiovascular: Normal rate and regular rhythm.  Pulses are palpable.    Pulmonary/Chest: Effort normal and breath sounds normal. No nasal flaring or stridor. No respiratory distress. He has no wheezes. He has no rhonchi. He has no rales. He exhibits no retraction.   Abdominal: Soft. Bowel sounds are normal. He exhibits no distension and no mass. There is no tenderness. There is no rebound and no guarding. No hernia.   Musculoskeletal: Normal range of motion.   Neurological: He is alert.   Skin: Skin is warm. Capillary refill takes less than 3 seconds.   Nursing note and vitals reviewed.      ED Course     Procedures    Lab Results     Results for orders placed or performed during the hospital encounter of 10/17/17   Metered blood glucose   Result Value Ref Range    Glucose Bedside POC 90 65 - 99 mg/dL           Radiology Results     Imaging Results    None         ED Medication Orders     Start Ordered     Status Ordering Provider    10/17/17 0311 10/17/17 0310   "input(s): "PH", "PCO2", "PO2", "HCO3", "POCSATURATED", "BE" in the last 72 hours.     ASSESSMENT    Physical Exam  Cardiovascular:      Rate and Rhythm: Normal rate.   Neurological:      Mental Status: He is alert.         INTERVENTIONS    The patient was seen for Cardiac problem. Staff concerns included hypertension and chest pain. The following interventions were performed: BMP, CBC, Troponin, EKG, continuous cardiac monitoring continued, and continue diureses and medical management per primary team recommendations.    RECOMMENDATIONS  Continue medical management of heart failure exacerbation and chest pain as per primary team.    PROVIDER ESCALATION    Yes/No  Yes    Orders received and case discussed with Dr. Judge .    Disposition: Remain in room 8095.    FOLLOW-UP    charge Mady BEVERLY  updated on plan of care. Instructed to call the Rapid Response Nurse, Kacie Melgar RN at [phone] for additional questions or concerns.            " amoxicillin-clavulanate (AUGMENTIN-ES) 600-42.9 MG/5ML suspension 528 mg  ONCE      Last MAR action:  Given YINKA MAYA    10/17/17 0242 10/17/17 0241  ibuprofen (MOTRIN,ADVIL) 100 MG/5ML suspension 116 mg  ONCE      Last MAR action:  Given YINKA MAYA          [facility]  Number of Diagnoses or Management Options  Acute otitis media, unspecified otitis media type:   Diagnosis management comments: Patient does have findings consistent with a right otitis media. Patient was given Motrin and Augmentin in the ER. Child did improve. Repeat vitals fever is improved. Child is active and playful and smiling. Family feels, watching television. They will continue using Tylenol Motrin for fever relief. They will take the Augmentin at home. They will return if any worsening. They will follow-up with pediatrics if no further worsening. They agree and understand plan.      Acute otitis media, unspecified otitis media type  (primary encounter diagnosis)      Clinical Impression     No diagnosis found.    Disposition        There is no disposition   There is no comment                  Yinka Maya DO  10/18/17 [id number]

## 2023-10-14 NOTE — PROGRESS NOTES
Pharmacist Renal Dose Adjustment Note    Ibrahima Phelps is a 67 y.o. male being treated with the medication famotidine.     Patient Data:    Vital Signs (Most Recent):  Temp: 97.9 °F (36.6 °C) (10/13/23 1854)  Pulse: 66 (10/13/23 2058)  Resp: 18 (10/13/23 2058)  BP: 133/64 (10/13/23 1939)  SpO2: 97 % (10/13/23 2058) Vital Signs (72h Range):  Temp:  [97.9 °F (36.6 °C)-98.3 °F (36.8 °C)]   Pulse:  [57-84]   Resp:  [15-28]   BP: (128-169)/(62-74)   SpO2:  [94 %-100 %]      Recent Labs   Lab 10/13/23  0836   CREATININE 4.1*     Serum creatinine: 4.1 mg/dL (H) 10/13/23 0836  Estimated creatinine clearance: 19.5 mL/min (A)    Famotidine 20mg BID will be changed to 20mg daily.    Pharmacist's Name: Jonny Foster  Pharmacist's Extension: [phone]

## 2023-10-14 NOTE — NURSING
Called to  pt c/o chest pain. Upon entering room pt was rocking back and forth on side of bed with hand over chest. Asked pt to rate pain 1/10 pt stated 10. Asked pt where on chest is the pain pt pointed towards the right side of chest. Mayank MCKEON, in room to assist. Notified MD. Pt placed on 3L O2 NC and VS taken. Received orders for STAT EKG and troponin and nitrostat.MD stated will come and assess pt. Contacted tele monitor who stated pt reading NS in/out A. Fib. PT given Nitro and within minutes pt stated the pain was going down. VS retaken SBP showed a minimum decrease. EKG performed reading NS w/ HR 70. VS retaken and pt states pain is currently about a 1/10. Pt awaiting Troponin to be drawn. Asked MD order for Mylanta; awaiting response. Pt left laying in bed w/ HOB elevated and O2 @2L per NC w/ wife at bedside. No s/s of distress noted at this time. Monitoring continues.

## 2023-10-14 NOTE — PLAN OF CARE
Problem: Adult Inpatient Plan of Care  Goal: Plan of Care Review  Outcome: Ongoing, Progressing  Goal: Patient-Specific Goal (Individualized)  Outcome: Ongoing, Progressing  Goal: Absence of Hospital-Acquired Illness or Injury  Outcome: Ongoing, Progressing  Goal: Optimal Comfort and Wellbeing  Outcome: Ongoing, Progressing  Goal: Readiness for Transition of Care  Outcome: Ongoing, Progressing     Problem: Fluid and Electrolyte Imbalance (Acute Kidney Injury/Impairment)  Goal: Fluid and Electrolyte Balance  Outcome: Ongoing, Progressing     Problem: Oral Intake Inadequate (Acute Kidney Injury/Impairment)  Goal: Optimal Nutrition Intake  Outcome: Ongoing, Progressing     Problem: Renal Function Impairment (Acute Kidney Injury/Impairment)  Goal: Effective Renal Function  Outcome: Ongoing, Progressing     Problem: Pain Acute  Goal: Acceptable Pain Control and Functional Ability  Outcome: Ongoing, Progressing

## 2023-10-14 NOTE — ASSESSMENT & PLAN NOTE
Patient admitted with shortness of breath, edema, PND, orthopnea, consistent with acute on chronic diastolic CHF exacerbation.  Last TTE in February of 2023; EF 65% with Grade II Diastolic Dysfunction    BNP: BNP  Recent Labs   Lab 10/13/23  0836   BNP 1,168*     CXR: No significant pleural fluid, evidence of patchy consolidation    Troponin:   Recent Labs   Lab 10/14/23  1025   TROPONINI 0.058*  0.058*       Home Meds:  Prescribed Lasix 40mg though not taking, states he took 20mg every other day over past week 2/2 swelling    Plan:  - 60 Lasix IV BID  - Strict ins and outs  - TTE pending   - Continue metoprolol

## 2023-10-14 NOTE — ASSESSMENT & PLAN NOTE
"Hgb 8.2 on admit, around baseline    Lab Results   Component Value Date    IRON 54 02/10/2023    TIBC 142 (L) 02/10/2023    FERRITIN 1,911 (H) 02/20/2023     Lab Results   Component Value Date    FOLATE 8.8 02/28/2023     Lab Results   Component Value Date    GWCOHACL86 278 06/20/2023     No results found for: "RETICCTPCT"    Likely secondary to CKD    Plan:   -   Lab Results   Component Value Date    HGB 7.1 (L) 10/14/2023     - Daily CBC   - f/u iron studies, vitamin B12, and folate  - Transfuse if Hgb < 7     "

## 2023-10-14 NOTE — ASSESSMENT & PLAN NOTE
Kidney transplant in 1996 and 2003  Cr elevated to 4.1 on admission, around patients baseline     - Continue home tacrolimus and prednisone  - Tacro level low  - KTM consulted

## 2023-10-14 NOTE — PROGRESS NOTES
[facility] - Telemetry Stepdown  Transplant Nephrology  Progress Note    Patient Name: Ibrahima Phelps  MRN: [id number]  Admission Date: 10/13/2023  Hospital Length of Stay: 1 days  Attending Provider: Ricky Judge MD   Primary Care Physician: Anatoliy Colindres MD  Principal Problem:<principal problem not specified>    Consults  Subjective:     Interval History: 67 yr old AAM with hx of kidney transplant in 4/12/2005 (bcr of 2.8-3.2), COPD, CAD s/p CABG in 2006 with active smoking, AFIB on amiodarone/Eliquis, HTN who is admitted for shortness of breath as well as dyspnea on exertion. Patient denies any fever but does admit to some congestion. Positive for cough but negative for nausea, vomiting, abdominal pain. Denies any diarrhea or swelling of his legs. Of note, patient admits to chest pain (present earlier this morning as well).    On arrival patient noted to be afebrile, mildly hypertensive with 100% on RA. Labs without any leukocytosis, creatinine of 4.1, BNP of 1168 with CXR showing RLL consolidation with CT chest findings compatible with pulmonary edema, severe coronary disease. Patient started on lasix IV with net negative 900 L.     Patient seen earlier today with his significant other. Admits to chest pain with SOB. Denies improvement in symptoms since admission. Of note, patient follows with Dr. Vail with PET stress performed on 9/13/20   - showing small sized, mild intensity apical inferior and inferoseptal reversible perfusion abnormality involving 6% of LV myocardium indicative of focal coronary stenosis.  -CT attenuation images demonstrate severe diffuse coronary calcifications in the LAD, and moderate diffuse coronary calcifications in the LCX and RCA territory and mild diffuse aortic calcifications in the descending aorta    Review of patient's allergies indicates:   Allergen Reactions    Ace inhibitors Swelling    Verapamil Other (See Comments)     Other reaction(s): Unknown    Povidone-iodine  Itching     Current Facility-Administered Medications   Medication Frequency    albuterol sulfate nebulizer solution 2.5 mg Q4H    aluminum & magnesium hydroxide-simethicone 400-400-40 mg/5 mL suspension 30 mL Q6H    amiodarone tablet 200 mg Daily    apixaban tablet 5 mg BID    aspirin EC tablet 81 mg Daily    atorvastatin tablet 10 mg Daily    azithromycin tablet 500 mg Daily    cefTRIAXone (ROCEPHIN) 1 g in dextrose 5 % in water (D5W) 100 mL IVPB (MB+) Q24H    dextrose 10% bolus 125 mL 125 mL PRN    dextrose 10% bolus 250 mL 250 mL PRN    famotidine tablet 20 mg Daily    furosemide injection 60 mg Q12H    glucagon (human recombinant) injection 1 mg PRN    glucose chewable tablet 16 g PRN    glucose chewable tablet 24 g PRN    melatonin tablet 6 mg Nightly PRN    metoprolol succinate (TOPROL-XL) 24 hr split tablet 12.5 mg Daily    naloxone 0.4 mg/mL injection 0.02 mg PRN    nitroGLYCERIN (NITROSTAT) 0.4 MG SL tablet     nitroGLYCERIN SL tablet 0.4 mg Q5 Min PRN    ondansetron disintegrating tablet 8 mg Q8H PRN    oxymetazoline 0.05 % nasal spray 2 spray BID    potassium bicarbonate disintegrating tablet 40 mEq BID    predniSONE tablet 5 mg Daily    prochlorperazine injection Soln 5 mg Q6H PRN    sodium chloride 0.9% flush 10 mL Q12H PRN    tacrolimus capsule 2 mg BID       Objective:     Vital Signs (Most Recent):  Temp: 97.9 °F (36.6 °C) (10/14/23 1133)  Pulse: 72 (10/14/23 1502)  Resp: 16 (10/14/23 1350)  BP: (!) 151/100 (10/14/23 1430)  SpO2: 100 % (10/14/23 1502) Vital Signs (24h Range):  Temp:  [97.8 °F (36.6 °C)-98.7 °F (37.1 °C)] 97.9 °F (36.6 °C)  Pulse:  [58-89] 72  Resp:  [15-20] 16  SpO2:  [97 %-100 %] 100 %  BP: (117-189)/() 151/100     Weight: 94.2 kg (207 lb 10.8 oz) (10/13/23 2317)  Body mass index is 27.4 kg/m².  Body surface area is 2.2 meters squared.    I/O last 3 completed shifts:  In: 220 [P.O.:220]  Out: 1145 [Urine:1145]    Physical Exam  Vitals reviewed.   Constitutional:        General: He is not in acute distress.     Appearance: Normal appearance. He is not ill-appearing or toxic-appearing.      Comments: Appears stated age   HENT:      Head: Normocephalic and atraumatic.      Right Ear: External ear normal.      Left Ear: External ear normal.      Nose: Nose normal.   Eyes:      General: No scleral icterus.     Conjunctiva/sclera: Conjunctivae normal.   Cardiovascular:      Rate and Rhythm: Normal rate and regular rhythm.      Pulses: Normal pulses.      Heart sounds: Normal heart sounds. No murmur heard.     No friction rub.   Pulmonary:      Effort: Pulmonary effort is normal. No respiratory distress.      Breath sounds: Normal breath sounds. No wheezing or rhonchi.   Abdominal:      General: Bowel sounds are normal. There is no distension.      Palpations: Abdomen is soft.      Tenderness: There is no abdominal tenderness. There is no guarding.   Musculoskeletal:         General: No swelling or deformity. Normal range of motion.      Cervical back: Normal range of motion and neck supple. No tenderness.      Right lower leg: No edema.      Left lower leg: No edema.   Skin:     General: Skin is warm and dry.      Coloration: Skin is not jaundiced.      Findings: No erythema or rash.   Neurological:      General: No focal deficit present.      Mental Status: He is alert and oriented to person, place, and time.      Motor: No weakness.   Psychiatric:         Mood and Affect: Mood normal.         Behavior: Behavior normal.         Assessment/Plan:   67 yr old AAM with hx of kidney transplant in 2005 (bcrof 2.8-3.2), COPD, CAD s/p CABG in 2006 with active smoking, AFIB on amiodarone/Eliquis, HTN who is admitted for shortness of breath as well as dyspnea on exertion    1) KATINA of kidney transplant  2) Immunosuppression  3) CKD stage V  4) CAD s/p CABG  5) COPD  6) AFIB on Eliquis  7) HTN    - currently being treated for community acquired pneumonia. Pro calcitonin noted. Defer management to  primary  - would recommend cardiology consult given abnormal PET stress in 9/2023, persistent chest pain and mildly elevated troponin. EKG without any significant changes from before  - remains with advanced CKD stage V. UA unremarkable.   - clinically without any signs of fluid overload with lung exam not consistent with HF. Would recommend to stop or place on lasix 40 mg PO  - cont on prograf 2 mg BID. Check Fk trough in AM  - cont on prednisone 5 mg daily  - stopped taking Cellcept roughly a year ago per Dr. Diaz/Dr. Webb    Will continue to follow. Please call if you have any questions.     Tasia Conroy, DO  Nephrology  [facility] - Telemetry Stepdown

## 2023-10-14 NOTE — PLAN OF CARE
Problem: Adult Inpatient Plan of Care  Goal: Plan of Care Review  Outcome: Ongoing, Progressing     Problem: Adult Inpatient Plan of Care  Goal: Patient-Specific Goal (Individualized)  Outcome: Ongoing, Progressing     Problem: Adult Inpatient Plan of Care  Goal: Absence of Hospital-Acquired Illness or Injury  Outcome: Ongoing, Progressing     Problem: Adult Inpatient Plan of Care  Goal: Optimal Comfort and Wellbeing  Outcome: Ongoing, Progressing     Problem: Oral Intake Inadequate (Acute Kidney Injury/Impairment)  Goal: Optimal Nutrition Intake  Outcome: Ongoing, Progressing     Pt AAOx4. VSS. RA. No complaints of pain. Strict I&O documented. Urinary output overnight of 895cc. Echo needed to be completed. Safety maintained. Pt ambulatory, refuses bed alarm. Call bell within reach.

## 2023-10-14 NOTE — NURSING
Called to room d/t pt c/o chest pain. Upon entering rm pt hand over chest and he is moving side to side moaning. CNMayank, at bedside to assist. Pt states the pain is worst this time. O2 placed on pt @3L, VS taken (elevated bp) and nitrostat given. MD notified. EKG and troponin ordered STAT. Within 5 mins pt pain subsided and bp decreased. Pt connected to bedside monitor. EKG resulted with NS. Per MD to give Mylanta, awaiting from pharmacy. MD will schedule for first 24-48 hrs. Awaiting troponin draw and medication from pharmacy. Wife and family remains at bedside. Monitoring continues.

## 2023-10-14 NOTE — ASSESSMENT & PLAN NOTE
Patchy consolidation observed on CXR in ED    - CT Chest WO Contrast ordered, could not r/o consolidation   - Pt started on CAP coverage

## 2023-10-15 PROBLEM — R07.9 CHEST PAIN: Status: ACTIVE | Noted: 2023-10-15

## 2023-10-15 PROBLEM — I50.33 ACUTE ON CHRONIC HEART FAILURE WITH PRESERVED EJECTION FRACTION: Status: ACTIVE | Noted: 2023-03-17

## 2023-10-15 LAB
ALBUMIN SERPL BCP-MCNC: 2.5 G/DL (ref 3.5–5.2)
ALP SERPL-CCNC: 55 U/L (ref 55–135)
ALT SERPL W/O P-5'-P-CCNC: 16 U/L (ref 10–44)
ANION GAP SERPL CALC-SCNC: 9 MMOL/L (ref 8–16)
ASCENDING AORTA: 2.91 CM
AST SERPL-CCNC: 17 U/L (ref 10–40)
AV INDEX (PROSTH): 0.49
AV MEAN GRADIENT: 9 MMHG
AV PEAK GRADIENT: 18 MMHG
AV VALVE AREA BY VELOCITY RATIO: 2.06 CM²
AV VALVE AREA: 1.85 CM²
AV VELOCITY RATIO: 0.54
BASOPHILS # BLD AUTO: 0.02 K/UL (ref 0–0.2)
BASOPHILS NFR BLD: 0.2 % (ref 0–1.9)
BILIRUB SERPL-MCNC: 0.3 MG/DL (ref 0.1–1)
BSA FOR ECHO PROCEDURE: 2.2 M2
BUN SERPL-MCNC: 32 MG/DL (ref 8–23)
CALCIUM SERPL-MCNC: 7.7 MG/DL (ref 8.7–10.5)
CHLORIDE SERPL-SCNC: 107 MMOL/L (ref 95–110)
CO2 SERPL-SCNC: 25 MMOL/L (ref 23–29)
CREAT SERPL-MCNC: 4.5 MG/DL (ref 0.5–1.4)
CV ECHO LV RWT: 0.29 CM
DIFFERENTIAL METHOD: ABNORMAL
DOP CALC AO PEAK VEL: 2.12 M/S
DOP CALC AO VTI: 49.26 CM
DOP CALC LVOT AREA: 3.8 CM2
DOP CALC LVOT DIAMETER: 2.2 CM
DOP CALC LVOT PEAK VEL: 1.15 M/S
DOP CALC LVOT STROKE VOLUME: 91.19 CM3
DOP CALCLVOT PEAK VEL VTI: 24 CM
E WAVE DECELERATION TIME: 217.91 MSEC
E/A RATIO: 1.3
E/E' RATIO: 12 M/S
ECHO LV POSTERIOR WALL: 0.84 CM (ref 0.6–1.1)
EOSINOPHIL # BLD AUTO: 0.6 K/UL (ref 0–0.5)
EOSINOPHIL NFR BLD: 7 % (ref 0–8)
ERYTHROCYTE [DISTWIDTH] IN BLOOD BY AUTOMATED COUNT: 18.6 % (ref 11.5–14.5)
EST. GFR  (NO RACE VARIABLE): 13.5 ML/MIN/1.73 M^2
FRACTIONAL SHORTENING: 25 % (ref 28–44)
GLUCOSE SERPL-MCNC: 84 MG/DL (ref 70–110)
HCT VFR BLD AUTO: 22.3 % (ref 40–54)
HGB BLD-MCNC: 7 G/DL (ref 14–18)
IMM GRANULOCYTES # BLD AUTO: 0.03 K/UL (ref 0–0.04)
IMM GRANULOCYTES NFR BLD AUTO: 0.4 % (ref 0–0.5)
INTERVENTRICULAR SEPTUM: 1.02 CM (ref 0.6–1.1)
IVRT: 114.18 MSEC
LA MAJOR: 6.82 CM
LA MINOR: 6.21 CM
LA WIDTH: 5.14 CM
LEFT ATRIUM SIZE: 4.49 CM
LEFT ATRIUM VOLUME INDEX MOD: 53.7 ML/M2
LEFT ATRIUM VOLUME INDEX: 58.5 ML/M2
LEFT ATRIUM VOLUME MOD: 117 CM3
LEFT ATRIUM VOLUME: 127.52 CM3
LEFT INTERNAL DIMENSION IN SYSTOLE: 4.31 CM (ref 2.1–4)
LEFT VENTRICLE DIASTOLIC VOLUME INDEX: 74.51 ML/M2
LEFT VENTRICLE DIASTOLIC VOLUME: 162.44 ML
LEFT VENTRICLE MASS INDEX: 96 G/M2
LEFT VENTRICLE SYSTOLIC VOLUME INDEX: 38.3 ML/M2
LEFT VENTRICLE SYSTOLIC VOLUME: 83.57 ML
LEFT VENTRICULAR INTERNAL DIMENSION IN DIASTOLE: 5.74 CM (ref 3.5–6)
LEFT VENTRICULAR MASS: 208.48 G
LV LATERAL E/E' RATIO: 10.67 M/S
LV SEPTAL E/E' RATIO: 13.71 M/S
LYMPHOCYTES # BLD AUTO: 1.7 K/UL (ref 1–4.8)
LYMPHOCYTES NFR BLD: 20.6 % (ref 18–48)
MAGNESIUM SERPL-MCNC: 2 MG/DL (ref 1.6–2.6)
MCH RBC QN AUTO: 24.9 PG (ref 27–31)
MCHC RBC AUTO-ENTMCNC: 31.4 G/DL (ref 32–36)
MCV RBC AUTO: 79 FL (ref 82–98)
MONOCYTES # BLD AUTO: 0.9 K/UL (ref 0.3–1)
MONOCYTES NFR BLD: 11.3 % (ref 4–15)
MV PEAK A VEL: 0.74 M/S
MV PEAK E VEL: 0.96 M/S
NEUTROPHILS # BLD AUTO: 5.1 K/UL (ref 1.8–7.7)
NEUTROPHILS NFR BLD: 60.5 % (ref 38–73)
NRBC BLD-RTO: 0 /100 WBC
PHOSPHATE SERPL-MCNC: 5 MG/DL (ref 2.7–4.5)
PISA TR MAX VEL: 3.74 M/S
PLATELET # BLD AUTO: 141 K/UL (ref 150–450)
PMV BLD AUTO: 11.8 FL (ref 9.2–12.9)
POTASSIUM SERPL-SCNC: 3.8 MMOL/L (ref 3.5–5.1)
PROT SERPL-MCNC: 5.6 G/DL (ref 6–8.4)
RA MAJOR: 5.75 CM
RA PRESSURE ESTIMATED: 3 MMHG
RA WIDTH: 4.01 CM
RBC # BLD AUTO: 2.81 M/UL (ref 4.6–6.2)
RIGHT VENTRICULAR END-DIASTOLIC DIMENSION: 3.18 CM
RV TB RVSP: 7 MMHG
SINUS: 3.37 CM
SODIUM SERPL-SCNC: 141 MMOL/L (ref 136–145)
STJ: 2.78 CM
TACROLIMUS BLD-MCNC: 2.7 NG/ML (ref 5–15)
TDI LATERAL: 0.09 M/S
TDI SEPTAL: 0.07 M/S
TDI: 0.08 M/S
TR MAX PG: 56 MMHG
TRICUSPID ANNULAR PLANE SYSTOLIC EXCURSION: 2.09 CM
TV REST PULMONARY ARTERY PRESSURE: 59 MMHG
WBC # BLD AUTO: 8.34 K/UL (ref 3.9–12.7)
Z-SCORE OF LEFT VENTRICULAR DIMENSION IN END DIASTOLE: -2.39
Z-SCORE OF LEFT VENTRICULAR DIMENSION IN END SYSTOLE: -0.22

## 2023-10-15 PROCEDURE — 94761 N-INVAS EAR/PLS OXIMETRY MLT: CPT

## 2023-10-15 PROCEDURE — 25000003 PHARM REV CODE 250

## 2023-10-15 PROCEDURE — 36415 COLL VENOUS BLD VENIPUNCTURE: CPT | Performed by: INTERNAL MEDICINE

## 2023-10-15 PROCEDURE — 80197 ASSAY OF TACROLIMUS: CPT | Performed by: INTERNAL MEDICINE

## 2023-10-15 PROCEDURE — 63700000 PHARM REV CODE 250 ALT 637 W/O HCPCS

## 2023-10-15 PROCEDURE — 63600175 PHARM REV CODE 636 W HCPCS

## 2023-10-15 PROCEDURE — 99233 SBSQ HOSP IP/OBS HIGH 50: CPT | Mod: ,,, | Performed by: STUDENT IN AN ORGANIZED HEALTH CARE EDUCATION/TRAINING PROGRAM

## 2023-10-15 PROCEDURE — 25000242 PHARM REV CODE 250 ALT 637 W/ HCPCS

## 2023-10-15 PROCEDURE — 84100 ASSAY OF PHOSPHORUS: CPT

## 2023-10-15 PROCEDURE — 20600001 HC STEP DOWN PRIVATE ROOM

## 2023-10-15 PROCEDURE — 94640 AIRWAY INHALATION TREATMENT: CPT

## 2023-10-15 PROCEDURE — 25000003 PHARM REV CODE 250: Performed by: STUDENT IN AN ORGANIZED HEALTH CARE EDUCATION/TRAINING PROGRAM

## 2023-10-15 PROCEDURE — 85025 COMPLETE CBC W/AUTO DIFF WBC: CPT

## 2023-10-15 PROCEDURE — 99900035 HC TECH TIME PER 15 MIN (STAT)

## 2023-10-15 PROCEDURE — 83735 ASSAY OF MAGNESIUM: CPT

## 2023-10-15 PROCEDURE — 80053 COMPREHEN METABOLIC PANEL: CPT

## 2023-10-15 PROCEDURE — 63600175 PHARM REV CODE 636 W HCPCS: Performed by: INTERNAL MEDICINE

## 2023-10-15 PROCEDURE — 99222 1ST HOSP IP/OBS MODERATE 55: CPT | Mod: GC,,, | Performed by: INTERNAL MEDICINE

## 2023-10-15 PROCEDURE — 99233 PR SUBSEQUENT HOSPITAL CARE,LEVL III: ICD-10-PCS | Mod: ,,, | Performed by: STUDENT IN AN ORGANIZED HEALTH CARE EDUCATION/TRAINING PROGRAM

## 2023-10-15 PROCEDURE — 99222 PR INITIAL HOSPITAL CARE,LEVL II: ICD-10-PCS | Mod: GC,,, | Performed by: INTERNAL MEDICINE

## 2023-10-15 RX ORDER — TACROLIMUS 1 MG/1
3 CAPSULE ORAL 2 TIMES DAILY
Status: DISCONTINUED | OUTPATIENT
Start: 2023-10-15 | End: 2023-10-20 | Stop reason: HOSPADM

## 2023-10-15 RX ORDER — AMLODIPINE BESYLATE 5 MG/1
5 TABLET ORAL DAILY
Status: DISCONTINUED | OUTPATIENT
Start: 2023-10-15 | End: 2023-10-16

## 2023-10-15 RX ORDER — METOPROLOL SUCCINATE 25 MG/1
25 TABLET, EXTENDED RELEASE ORAL DAILY
Status: DISCONTINUED | OUTPATIENT
Start: 2023-10-16 | End: 2023-10-20 | Stop reason: HOSPADM

## 2023-10-15 RX ADMIN — AMIODARONE HYDROCHLORIDE 200 MG: 200 TABLET ORAL at 08:10

## 2023-10-15 RX ADMIN — CEFTRIAXONE 1 G: 1 INJECTION, POWDER, FOR SOLUTION INTRAMUSCULAR; INTRAVENOUS at 12:10

## 2023-10-15 RX ADMIN — APIXABAN 5 MG: 5 TABLET, FILM COATED ORAL at 08:10

## 2023-10-15 RX ADMIN — TACROLIMUS 3 MG: 1 CAPSULE ORAL at 05:10

## 2023-10-15 RX ADMIN — METOPROLOL SUCCINATE 12.5 MG: 25 TABLET, EXTENDED RELEASE ORAL at 08:10

## 2023-10-15 RX ADMIN — ISOSORBIDE DINITRATE: 20 TABLET ORAL at 08:10

## 2023-10-15 RX ADMIN — ALBUTEROL SULFATE 2.5 MG: 2.5 SOLUTION RESPIRATORY (INHALATION) at 07:10

## 2023-10-15 RX ADMIN — OXYMETAZOLINE HYDROCHLORIDE 2 SPRAY: 0.05 SPRAY NASAL at 08:10

## 2023-10-15 RX ADMIN — FAMOTIDINE 20 MG: 20 TABLET ORAL at 08:10

## 2023-10-15 RX ADMIN — AMLODIPINE BESYLATE 5 MG: 5 TABLET ORAL at 10:10

## 2023-10-15 RX ADMIN — TACROLIMUS 2 MG: 1 CAPSULE ORAL at 08:10

## 2023-10-15 RX ADMIN — ATORVASTATIN CALCIUM 10 MG: 10 TABLET, FILM COATED ORAL at 08:10

## 2023-10-15 RX ADMIN — PREDNISONE 5 MG: 5 TABLET ORAL at 08:10

## 2023-10-15 RX ADMIN — Medication 6 MG: at 08:10

## 2023-10-15 RX ADMIN — AZITHROMYCIN 500 MG: 250 TABLET, FILM COATED ORAL at 08:10

## 2023-10-15 RX ADMIN — ASPIRIN 81 MG: 81 TABLET, COATED ORAL at 08:10

## 2023-10-15 RX ADMIN — FUROSEMIDE 60 MG: 10 INJECTION, SOLUTION INTRAMUSCULAR; INTRAVENOUS at 12:10

## 2023-10-15 NOTE — HOSPITAL COURSE
Patient admitted for further management. Patient continued having chest pain, cardiology consulted. Patient found to have elevated PA pressures concerning for pHTN which was likely contributing to shortness of breath. KTM consulted and recommended stopping lasix. Pt continuing to have chest pain in hospital, Cardiology following, initiated ACS protocol with plans to do a PET stress to assess for Ischemia, though patient not presently a candidate for LHC given CKD5. Pt not amenable to dialysis. Pt transfused 1 unit of pRBCs during admission for Hgb <7. Palliative consulted for GOC but ultimately patient was discharged home with instructions to follow up closely with KTM and cardiology.

## 2023-10-15 NOTE — SUBJECTIVE & OBJECTIVE
Interval History: chest pain resolved with scheduled mylanta. Cr rising. Otherwise VSS    Review of Systems   Constitutional:  Negative for chills, fatigue and fever.   HENT:  Negative for ear pain and sinus pain.    Eyes:  Negative for photophobia, pain and visual disturbance.   Respiratory:  Negative for chest tightness, shortness of breath and wheezing.    Cardiovascular:  Positive for chest pain. Negative for leg swelling.   Gastrointestinal:  Negative for abdominal pain, blood in stool, constipation, diarrhea, nausea and vomiting.   Endocrine: Negative for polydipsia and polyuria.   Genitourinary:  Negative for difficulty urinating, dysuria, flank pain, frequency, penile pain and testicular pain.   Musculoskeletal:  Negative for back pain, neck pain and neck stiffness.   Skin:  Negative for color change and rash.   Neurological:  Negative for dizziness, syncope, weakness, numbness and headaches.   Psychiatric/Behavioral:  Negative for confusion and sleep disturbance.      Objective:     Vital Signs (Most Recent):  Temp: 98 °F (36.7 °C) (10/15/23 0907)  Pulse: 61 (10/15/23 1137)  Resp: 16 (10/15/23 0907)  BP: (!) 164/71 (10/15/23 0930)  SpO2: 98 % (10/15/23 1100) Vital Signs (24h Range):  Temp:  [97.9 °F (36.6 °C)-98.8 °F (37.1 °C)] 98 °F (36.7 °C)  Pulse:  [56-87] 61  Resp:  [16-48] 16  SpO2:  [95 %-100 %] 98 %  BP: (138-170)/() 164/71     Weight: 93.9 kg (207 lb)  Body mass index is 27.31 kg/m².    Intake/Output Summary (Last 24 hours) at 10/15/2023 1411  Last data filed at 10/15/2023 0629  Gross per 24 hour   Intake 600 ml   Output 1200 ml   Net -600 ml         Physical Exam  Constitutional:       General: He is not in acute distress.     Appearance: He is not ill-appearing.   HENT:      Head: Normocephalic and atraumatic.      Right Ear: External ear normal.      Left Ear: External ear normal.      Nose: Nose normal. No congestion or rhinorrhea.      Mouth/Throat:      Mouth: Mucous membranes are dry.       Pharynx: Oropharynx is clear. No oropharyngeal exudate or posterior oropharyngeal erythema.   Eyes:      General: No scleral icterus.        Right eye: No discharge.         Left eye: No discharge.      Extraocular Movements: Extraocular movements intact.      Conjunctiva/sclera: Conjunctivae normal.   Cardiovascular:      Rate and Rhythm: Normal rate and regular rhythm.      Pulses: Normal pulses.      Heart sounds: Normal heart sounds. No murmur heard.     No gallop.   Pulmonary:      Effort: No respiratory distress.      Breath sounds: Normal breath sounds. No stridor. No wheezing or rales.   Chest:      Chest wall: No tenderness.   Abdominal:      General: Abdomen is flat. Bowel sounds are normal. There is no distension.      Palpations: Abdomen is soft. There is no mass.      Tenderness: There is no abdominal tenderness. There is no guarding or rebound.      Hernia: No hernia is present.   Musculoskeletal:         General: No swelling, tenderness or deformity. Normal range of motion.      Cervical back: Normal range of motion. No rigidity or tenderness.      Right lower leg: Edema present.      Left lower leg: Edema present.   Skin:     General: Skin is warm and dry.      Capillary Refill: Capillary refill takes less than 2 seconds.      Coloration: Skin is not jaundiced.      Findings: No bruising, erythema or rash.   Neurological:      General: No focal deficit present.      Mental Status: He is alert and oriented to person, place, and time.      Cranial Nerves: No cranial nerve deficit.      Sensory: No sensory deficit.      Motor: No weakness.   Psychiatric:         Mood and Affect: Mood normal.         Behavior: Behavior normal.         Thought Content: Thought content normal.         Judgment: Judgment normal.             Significant Labs: All pertinent labs within the past 24 hours have been reviewed.    Significant Imaging: I have reviewed all pertinent imaging results/findings within the past 24  hours.

## 2023-10-15 NOTE — ASSESSMENT & PLAN NOTE
"Hgb 8.2 on admit, around baseline    Lab Results   Component Value Date    IRON 54 02/10/2023    TIBC 142 (L) 02/10/2023    FERRITIN 1,911 (H) 02/20/2023     Lab Results   Component Value Date    FOLATE 8.8 02/28/2023     Lab Results   Component Value Date    IRVHRVEL43 278 06/20/2023     No results found for: "RETICCTPCT"    Likely secondary to CKD    Plan:   -   Lab Results   Component Value Date    HGB 7.0 (L) 10/15/2023     - Transfuse if Hgb < 7     "

## 2023-10-15 NOTE — SUBJECTIVE & OBJECTIVE
"Past Medical History:   Diagnosis Date    Atrial fibrillation     CAD (coronary artery disease) 2006    MI in 2006    CHF (congestive heart failure) 2017    CKD (chronic kidney disease) stage 3, GFR 30-59 ml/min     Dr. Latif.  in transplanted kidney    COVID-19 2/7/2023    Diverticulosis     Hyperlipidemia     Hypertension     Keloid cicatrix     Metabolic bone disease     Other emphysema 10/1/2019    Pericarditis     S/P kidney transplant 1992    x2 (1992 & 2005),    Thrombocytopenia     Thyroid disease     Tobacco use 09/05/2014       Past Surgical History:   Procedure Laterality Date    CARDIOVERSION  04/30/15    CHOLECYSTECTOMY      COLONOSCOPY  April 20, 2011    Diverticulosis, repeat recommended in 3 yrs., repeat colonoscopy 2014 revealed 2 polyps.  He should return in 5 years.    COLONOSCOPY N/A 3/13/2020    Procedure: COLONOSCOPY;  Surgeon: Chon Casper MD;  Location: [facility] (4TH FLR);  Service: Endoscopy;  Laterality: N/A;  ok to hold coumadin x5days-see telephone encounter 2/4/20-tb    COLONOSCOPY N/A 2/4/2021    Procedure: COLONOSCOPY;  Surgeon: Chon Casper MD;  Location: [facility] (4TH FLR);  Service: Endoscopy;  Laterality: N/A;  Eliquis - per Dr. GAVIN Blunt ok to hold x 2 days and "restarted asap"- ER[facility]  last prep "poor", [id number] ordered/ Pt requests Dr. Casper  prep ins. mailed - COVID screening on 2/1/21 PC[facility]- ER[facility]    COLONOSCOPY N/A 3/3/2021    Procedure: COLONOSCOPY;  Surgeon: Chon Casper MD;  Location: [facility] (4TH FLR);  Service: Endoscopy;  Laterality: N/A;  Patient with his second poor bowel pre and has poor prep today.  If patient not intersted or can't get colonoscopy tomorrow will need constipation bowel prep and will need to restart his Eliquis today.Thanks,Chon     per Dr Blunt-ok to hold Eliquis 2 days prior      COVID     CORONARY ANGIOPLASTY WITH STENT PLACEMENT  9/13/2006    IRRIGATION AND DEBRIDEMENT Right 8/30/2023    Procedure: IRRIGATION AND DEBRIDEMENT, " RIGHT MIDDLE FINGER;  Surgeon: Martin Larsen MD;  Location: [facility] FLR;  Service: Orthopedics;  Laterality: Right;    KIDNEY TRANSPLANT  2005    PARATHYROIDECTOMY      TREATMENT OF CARDIAC ARRHYTHMIA N/A 3/28/2022    Procedure: CARDIOVERSION;  Surgeon: Migue Blunt MD;  Location: [facility] EP LAB;  Service: Cardiology;  Laterality: N/A;  AF, DCC, MAC, GP, 3 PREP*RODRIGO deferred pt 100% compliant*       Review of patient's allergies indicates:   Allergen Reactions    Ace inhibitors Swelling    Verapamil Other (See Comments)     Other reaction(s): Unknown    Povidone-iodine Itching       No current facility-administered medications on file prior to encounter.     Current Outpatient Medications on File Prior to Encounter   Medication Sig    acetaminophen (TYLENOL) 500 MG tablet Take 1 tablet (500 mg total) by mouth every 6 (six) hours as needed for Pain.    albuterol (PROVENTIL) 2.5 mg /3 mL (0.083 %) nebulizer solution Take 3 mLs (2.5 mg total) by nebulization every 6 (six) hours as needed for Wheezing. Rescue    albuterol (VENTOLIN HFA) 90 mcg/actuation inhaler Inhale 2 puffs into the lungs every 6 (six) hours as needed for Wheezing or Shortness of Breath. Rescue    amiodarone (PACERONE) 200 MG Tab Take 1 tablet (200 mg total) by mouth once daily.    apixaban (ELIQUIS) 5 mg Tab Take 1 tablet (5 mg total) by mouth 2 (two) times daily.    atorvastatin (LIPITOR) 10 MG tablet TAKE 1 TABLET BY MOUTH EVERY DAY    b complex vitamins (B COMPLEX-VITAMIN B12) tablet Take 1 tablet by mouth once daily.    calcium carbonate (CALCIUM 600 ORAL) Take 1 tablet by mouth 2 (two) times a day.    doxazosin (CARDURA) 4 MG tablet Take 1 tablet (4 mg total) by mouth every 12 (twelve) hours.    famotidine (PEPCID) 20 MG tablet Take 1 tablet (20 mg total) by mouth 2 (two) times daily.    fexofenadine HCl (ALLEGRA ORAL) Take 1 tablet by mouth daily as needed (allergies).    fluticasone propionate (FLONASE) 50 mcg/actuation nasal  spray 1 spray (50 mcg total) by Each Nostril route once daily. (Patient taking differently: 1 spray by Each Nostril route daily as needed for Allergies.)    furosemide (LASIX) 40 MG tablet Take 0.5 tablets (20 mg total) by mouth 2 (two) times daily. HOLD this medication on discharge per nephrology recs, and resume only once needed for lower extremity edema. (Patient taking differently: Take 20 mg by mouth every other day.)    gabapentin (NEURONTIN) 100 MG capsule Take 1 capsule (100 mg total) by mouth as needed. (Patient taking differently: Take 100 mg by mouth as needed (pain).)    hydrALAZINE (APRESOLINE) 50 MG tablet TAKE 1 TABLET (50 MG TOTAL) BY MOUTH 3 (THREE) TIMES DAILY.    metoprolol succinate (TOPROL-XL) 25 MG 24 hr tablet Take 1 tablet (25 mg total) by mouth once daily. (Patient taking differently: Take 12.5 mg by mouth once daily.)    MITIGARE 0.6 mg Cap TAKE 1 CAPSULE BY MOUTH TWICE A DAY AS NEEDED GOUT FLARE UP TO 3 DAYS    multivitamin (THERAGRAN) per tablet Take 1 tablet by mouth Daily.    NIFEdipine (PROCARDIA-XL) 60 MG (OSM) 24 hr tablet Take 1 tablet (60 mg total) by mouth 2 (two) times a day.    omeprazole (PRILOSEC) 20 MG capsule Take 1 capsule (20 mg total) by mouth once daily. (Patient taking differently: Take 20 mg by mouth daily as needed (heartburn).)    oxyCODONE (ROXICODONE) 5 MG immediate release tablet Take 1 tablet (5 mg total) by mouth every 4 (four) hours as needed (pain scale 7-10).    paricalcitoL (ZEMPLAR) 1 MCG capsule TAKE 1 CAPSULE ON MONDAY, WEDNESDAY AND FRIDAY    predniSONE (DELTASONE) 5 MG tablet TAKE 1 TABLET BY MOUTH EVERY DAY IN THE MORNING    tacrolimus (PROGRAF) 1 MG Cap Take 2 capsules (2 mg total) by mouth every morning AND 2 capsules (2 mg total) every evening.    vitamin D 1000 units Tab Take 370 mg by mouth 2 (two) times daily. 2 tablets BID    ammonium lactate 12 % Crea Apply 1 g topically once daily.    fluticasone propion-salmeterol 115-21 mcg/dose (ADVAIR HFA)  115-21 mcg/actuation HFAA inhaler Inhale 2 puffs into the lungs every 12 (twelve) hours. Controller    pulse oximeter (PULSE OXIMETER) device by Apply Externally route 2 (two) times a day. Use twice daily at 8 AM and 3 PM and record the value in MyChart as directed.     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     Patient is currently retired   Substance and Sexual Activity    Alcohol use: No    Drug use: No    Sexual activity: Yes     Partners: Female     Review of Systems   Constitutional: Negative for chills, decreased appetite, diaphoresis, fever, malaise/fatigue and weight gain.   HENT:  Negative for congestion and ear discharge.    Eyes:  Negative for blurred vision.   Cardiovascular:  Positive for dyspnea on exertion. Negative for chest pain, irregular heartbeat, leg swelling, orthopnea, palpitations and paroxysmal nocturnal dyspnea.   Respiratory:  Positive for shortness of breath. Negative for cough, snoring and sputum production.    Skin:  Negative for color change, dry skin and rash.   Musculoskeletal:  Negative for back pain, falls, joint pain and neck pain.   Gastrointestinal:  Negative for bloating, abdominal pain, constipation and diarrhea.   Genitourinary:  Negative for dysuria, flank pain, hematuria and hesitancy.   Neurological:  Negative for focal weakness, headaches, numbness and seizures.   Psychiatric/Behavioral:  Negative for altered mental  status, depression and hallucinations.      Objective:     Vital Signs (Most Recent):  Temp: 98 °F (36.7 °C) (10/15/23 0907)  Pulse: 61 (10/15/23 1137)  Resp: 16 (10/15/23 0907)  BP: (!) 164/71 (10/15/23 0930)  SpO2: 98 % (10/15/23 1100) Vital Signs (24h Range):  Temp:  [97.9 °F (36.6 °C)-98.8 °F (37.1 °C)] 98 °F (36.7 °C)  Pulse:  [56-71] 61  Resp:  [16-20] 16  SpO2:  [95 %-100 %] 98 %  BP: (138-164)/(62-74) 164/71     Weight: 93.9 kg (207 lb)  Body mass index is 27.31 kg/m².    SpO2: 98 %         Intake/Output Summary (Last 24 hours) at 10/15/2023 1508  Last data filed at 10/15/2023 0629  Gross per 24 hour   Intake 600 ml   Output 1200 ml   Net -600 ml       Lines/Drains/Airways       Peripheral Intravenous Line  Duration                  Peripheral IV - Single Lumen 10/13/23 1500 20 G;1 3/4 in Anterior;Right Upper Arm 2 days                     Physical Exam  Constitutional:       General: He is not in acute distress.     Appearance: He is not ill-appearing.   HENT:      Nose: Nose normal.      Mouth/Throat:      Mouth: Mucous membranes are moist.   Eyes:      Pupils: Pupils are equal, round, and reactive to light.   Neck:      Comments: No JVD appreciated   Cardiovascular:      Rate and Rhythm: Normal rate and regular rhythm.      Pulses: Normal pulses.      Heart sounds: No murmur heard.  Pulmonary:      Effort: Pulmonary effort is normal. No respiratory distress.      Breath sounds: No wheezing or rales.   Abdominal:      General: Abdomen is flat. There is no distension.      Palpations: Abdomen is soft.      Tenderness: There is no right CVA tenderness or left CVA tenderness.   Musculoskeletal:         General: No swelling.      Cervical back: Normal range of motion and neck supple. No tenderness.      Right lower leg: No edema.      Left lower leg: No edema.   Skin:     General: Skin is warm.      Coloration: Skin is not pale.      Findings: No rash.   Neurological:      General: No focal deficit present.     "  Mental Status: He is alert and oriented to person, place, and time.      Motor: No weakness.          Significant Labs: ABG: No results for input(s): "PH", "PCO2", "HCO3", "POCSATURATED", "BE" in the last 48 hours., Blood Culture:   Recent Labs   Lab 10/13/23  2046 10/13/23  2047   LABBLOO No Growth to date  No Growth to date No Growth to date  No Growth to date   , BMP:   Recent Labs   Lab 10/14/23  0522 10/14/23  1926 10/15/23  0439   GLU 81 104 84    142 141   K 3.3* 3.8 3.8    108 107   CO2 20* 19* 25   BUN 36* 32* 32*   CREATININE 4.2* 4.4* 4.5*   CALCIUM 7.7* 7.7* 7.7*   MG 1.9 1.9 2.0   , CMP   Recent Labs   Lab 10/14/23  0522 10/14/23  1926 10/15/23  0439    142 141   K 3.3* 3.8 3.8    108 107   CO2 20* 19* 25   GLU 81 104 84   BUN 36* 32* 32*   CREATININE 4.2* 4.4* 4.5*   CALCIUM 7.7* 7.7* 7.7*   PROT 5.9*  --  5.6*   ALBUMIN 2.6*  --  2.5*   BILITOT 0.3  --  0.3   ALKPHOS 60  --  55   AST 17  --  17   ALT 14  --  16   ANIONGAP 13 15 9   , CBC   Recent Labs   Lab 10/14/23  0522 10/14/23  1926 10/15/23  0439   WBC 7.02 7.39 8.34   HGB 7.1* 7.2* 7.0*   HCT 22.2* 22.7* 22.3*   * 164 141*   , INR No results for input(s): "INR", "PROTIME" in the last 48 hours., Lipid Panel No results for input(s): "CHOL", "HDL", "LDLCALC", "TRIG", "CHOLHDL" in the last 48 hours.,   Pathology Results  (Last 10 years)                 03/03/21 1340  Specimen to Pathology, Surgery Gastrointestinal tract Final result    Narrative:  Pre-op Diagnosis: History of adenomatous polyp of colon   [Z86.010]   Current use of long term anticoagulation [Z79.01]   Procedure(s):   COLONOSCOPY   Number of specimens: 1   Name of specimens:   jar1) sigmoid colon biopsy r/o IBD/dysplasia   Release to patient->7 Day Delay   Specimen total (fresh, frozen, permanent):->1           , Troponin   Recent Labs   Lab 10/13/23  2046 10/14/23  1025 10/14/23  1517   TROPONINI 0.058* 0.058*  0.058* 0.049*   , and All pertinent " lab results from the last 24 hours have been reviewed.

## 2023-10-15 NOTE — CONSULTS
[facility] - Telemetry Stepdown  Cardiology  Consult Note    Patient Name: Ibrahima Phelps  MRN: [id number]  Admission Date: 10/13/2023  Hospital Length of Stay: 2 days  Code Status: Full Code   Attending Provider: Ricky Judge MD   Consulting Provider: Holden Pacheco MD  Primary Care Physician: Anatoliy Colindres MD  Principal Problem:Acute on chronic heart failure with preserved ejection fraction    Patient information was obtained from patient and ER records.     Inpatient consult to Cardiology  Consult performed by: Holden Pacheco MD  Consult ordered by: Randal Beaver MD        Subjective:     Chief Complaint:  Chest pain      HPI:   67 yr old AAM with paroxysmal Afib s/p cardioversion 3/2022 (on Eliquis, Metoprolol, and Amiodarone),  HFpEF (EF 65%), kidney transplant in 4/12/2005 (bcr of 2.8-3.2), COPD, CAD s/p PCI in 2006, HTN, tobacco abuse presented with shortness of breath.  Cardiology consulted for recurrent chest pain.  Noted to have elevated BNP, and  CXR showing RLL consolidation with CT chest findings compatible with pulmonary edema.  Patient was admitted and started on IV Lasix and antibiotics.  During his hospital stay has recurrent chest pain has required nitro x2.  Chest pain occurs randomly during rest, burning in quality over mid to right chest without radiation.  Patient has similar recurrent pain at home, that usually improves after omeprazole.  In addition patient has been having recurrent episodes of shortness of breath on exertion, follows up with Dr. Vail.  Today he denies shortness of breath, palpitations, presyncope, syncope, leg swelling, PND, or orthopnea.            Of note, patient follows with Dr. Vail with PET stress performed on 9/13/2023   - showing small sized, mild intensity apical inferior and inferoseptal reversible perfusion abnormality involving 6% of LV myocardium indicative of focal coronary stenosis.  -CT attenuation images demonstrate severe diffuse coronary  calcifications in the LAD, and moderate diffuse coronary calcifications in the LCX and RCA territory and mild diffuse aortic calcifications in the descending aorta      TTE 2/2023   The left ventricle is normal in size with normal systolic function. The estimated ejection fraction is 65%.   Normal right ventricular size with normal right ventricular systolic function.   Grade II left ventricular diastolic dysfunction.   Severe left atrial enlargement.   There is mild aortic valve stenosis. Aortic valve area is 1.8 cm2; peak velocity is 2.9 m/s; mean gradient is 15 mmHg.   Pulmonary HTN - the estimated PA systolic pressure is > 41mm Hg. (The IVC was not well visualized.)      Past Medical History:   Diagnosis Date    Atrial fibrillation     CAD (coronary artery disease) 2006    MI in 2006    CHF (congestive heart failure) 2017    CKD (chronic kidney disease) stage 3, GFR 30-59 ml/min     Dr. Latif.  in transplanted kidney    COVID-19 2/7/2023    Diverticulosis     Hyperlipidemia     Hypertension     Keloid cicatrix     Metabolic bone disease     Other emphysema 10/1/2019    Pericarditis     S/P kidney transplant 1992    x2 (1992 & 2005),    Thrombocytopenia     Thyroid disease     Tobacco use 09/05/2014       Past Surgical History:   Procedure Laterality Date    CARDIOVERSION  04/30/15    CHOLECYSTECTOMY      COLONOSCOPY  April 20, 2011    Diverticulosis, repeat recommended in 3 yrs., repeat colonoscopy 2014 revealed 2 polyps.  He should return in 5 years.    COLONOSCOPY N/A 3/13/2020    Procedure: COLONOSCOPY;  Surgeon: Chon Casper MD;  Location: [facility] ([address] [place] [address]);  Service: Endoscopy;  Laterality: N/A;  ok to hold coumadin x5days-see telephone encounter 2/4/20-tb    COLONOSCOPY N/A 2/4/2021    Procedure: COLONOSCOPY;  Surgeon: Chon Casper MD;  Location: [facility] ([address] [place] [address]);  Service: Endoscopy;  Laterality: N/A;  Eliquis - per Dr. GAVIN Blunt ok to hold x 2 days and  ""restarted asap"- ERW  last prep "poor", [id number] ordered/ Pt requests Dr. Casper  prep ins. mailed - COVID screening on 2/1/21 PCW- ERW    COLONOSCOPY N/A 3/3/2021    Procedure: COLONOSCOPY;  Surgeon: Chon Casper MD;  Location: [facility] (4TH FLR);  Service: Endoscopy;  Laterality: N/A;  Patient with his second poor bowel pre and has poor prep today.  If patient not intersted or can't get colonoscopy tomorrow will need constipation bowel prep and will need to restart his Eliquis today.Thanks,Chon     per Dr Lopez to hold Eliquis 2 days prior      COVID     CORONARY ANGIOPLASTY WITH STENT PLACEMENT  9/13/2006    IRRIGATION AND DEBRIDEMENT Right 8/30/2023    Procedure: IRRIGATION AND DEBRIDEMENT, RIGHT MIDDLE FINGER;  Surgeon: Martin Larsen MD;  Location: [facility] OR 2ND FLR;  Service: Orthopedics;  Laterality: Right;    KIDNEY TRANSPLANT  2005    PARATHYROIDECTOMY      TREATMENT OF CARDIAC ARRHYTHMIA N/A 3/28/2022    Procedure: CARDIOVERSION;  Surgeon: Migue Blunt MD;  Location: [facility] EP LAB;  Service: Cardiology;  Laterality: N/A;  AF, DCC, MAC, GP, 3 PREP*RODRIGO deferred pt 100% compliant*       Review of patient's allergies indicates:   Allergen Reactions    Ace inhibitors Swelling    Verapamil Other (See Comments)     Other reaction(s): Unknown    Povidone-iodine Itching       No current facility-administered medications on file prior to encounter.     Current Outpatient Medications on File Prior to Encounter   Medication Sig    acetaminophen (TYLENOL) 500 MG tablet Take 1 tablet (500 mg total) by mouth every 6 (six) hours as needed for Pain.    albuterol (PROVENTIL) 2.5 mg /3 mL (0.083 %) nebulizer solution Take 3 mLs (2.5 mg total) by nebulization every 6 (six) hours as needed for Wheezing. Rescue    albuterol (VENTOLIN HFA) 90 mcg/actuation inhaler Inhale 2 puffs into the lungs every 6 (six) hours as needed for Wheezing or Shortness of Breath. Rescue    amiodarone (PACERONE) 200 " MG Tab Take 1 tablet (200 mg total) by mouth once daily.    apixaban (ELIQUIS) 5 mg Tab Take 1 tablet (5 mg total) by mouth 2 (two) times daily.    atorvastatin (LIPITOR) 10 MG tablet TAKE 1 TABLET BY MOUTH EVERY DAY    b complex vitamins (B COMPLEX-VITAMIN B12) tablet Take 1 tablet by mouth once daily.    calcium carbonate (CALCIUM 600 ORAL) Take 1 tablet by mouth 2 (two) times a day.    doxazosin (CARDURA) 4 MG tablet Take 1 tablet (4 mg total) by mouth every 12 (twelve) hours.    famotidine (PEPCID) 20 MG tablet Take 1 tablet (20 mg total) by mouth 2 (two) times daily.    fexofenadine HCl (ALLEGRA ORAL) Take 1 tablet by mouth daily as needed (allergies).    fluticasone propionate (FLONASE) 50 mcg/actuation nasal spray 1 spray (50 mcg total) by Each Nostril route once daily. (Patient taking differently: 1 spray by Each Nostril route daily as needed for Allergies.)    furosemide (LASIX) 40 MG tablet Take 0.5 tablets (20 mg total) by mouth 2 (two) times daily. HOLD this medication on discharge per nephrology recs, and resume only once needed for lower extremity edema. (Patient taking differently: Take 20 mg by mouth every other day.)    gabapentin (NEURONTIN) 100 MG capsule Take 1 capsule (100 mg total) by mouth as needed. (Patient taking differently: Take 100 mg by mouth as needed (pain).)    hydrALAZINE (APRESOLINE) 50 MG tablet TAKE 1 TABLET (50 MG TOTAL) BY MOUTH 3 (THREE) TIMES DAILY.    metoprolol succinate (TOPROL-XL) 25 MG 24 hr tablet Take 1 tablet (25 mg total) by mouth once daily. (Patient taking differently: Take 12.5 mg by mouth once daily.)    MITIGARE 0.6 mg Cap TAKE 1 CAPSULE BY MOUTH TWICE A DAY AS NEEDED GOUT FLARE UP TO 3 DAYS    multivitamin (THERAGRAN) per tablet Take 1 tablet by mouth Daily.    NIFEdipine (PROCARDIA-XL) 60 MG (OSM) 24 hr tablet Take 1 tablet (60 mg total) by mouth 2 (two) times a day.    omeprazole (PRILOSEC) 20 MG capsule Take 1 capsule (20 mg total) by mouth  once daily. (Patient taking differently: Take 20 mg by mouth daily as needed (heartburn).)    oxyCODONE (ROXICODONE) 5 MG immediate release tablet Take 1 tablet (5 mg total) by mouth every 4 (four) hours as needed (pain scale 7-10).    paricalcitoL (ZEMPLAR) 1 MCG capsule TAKE 1 CAPSULE ON MONDAY, WEDNESDAY AND FRIDAY    predniSONE (DELTASONE) 5 MG tablet TAKE 1 TABLET BY MOUTH EVERY DAY IN THE MORNING    tacrolimus (PROGRAF) 1 MG Cap Take 2 capsules (2 mg total) by mouth every morning AND 2 capsules (2 mg total) every evening.    vitamin D 1000 units Tab Take 370 mg by mouth 2 (two) times daily. 2 tablets BID    ammonium lactate 12 % Crea Apply 1 g topically once daily.    fluticasone propion-salmeterol 115-21 mcg/dose (ADVAIR HFA) 115-21 mcg/actuation HFAA inhaler Inhale 2 puffs into the lungs every 12 (twelve) hours. Controller    pulse oximeter (PULSE OXIMETER) device by Apply Externally route 2 (two) times a day. Use twice daily at 8 AM and 3 PM and record the value in MyChart as directed.     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     Patient is currently retired   Substance and Sexual Activity    Alcohol use: No    Drug use: No    Sexual activity: Yes     Partners: Female     Review of Systems   Constitutional: Negative for chills, decreased appetite, diaphoresis, fever, malaise/fatigue and weight gain.   HENT:  Negative for congestion and  ear discharge.    Eyes:  Negative for blurred vision.   Cardiovascular:  Positive for dyspnea on exertion. Negative for chest pain, irregular heartbeat, leg swelling, orthopnea, palpitations and paroxysmal nocturnal dyspnea.   Respiratory:  Positive for shortness of breath. Negative for cough, snoring and sputum production.    Skin:  Negative for color change, dry skin and rash.   Musculoskeletal:  Negative for back pain, falls, joint pain and neck pain.   Gastrointestinal:  Negative for bloating, abdominal pain, constipation and diarrhea.   Genitourinary:  Negative for dysuria, flank pain, hematuria and hesitancy.   Neurological:  Negative for focal weakness, headaches, numbness and seizures.   Psychiatric/Behavioral:  Negative for altered mental status, depression and hallucinations.      Objective:     Vital Signs (Most Recent):  Temp: 98 °F (36.7 °C) (10/15/23 0907)  Pulse: 61 (10/15/23 1137)  Resp: 16 (10/15/23 0907)  BP: (!) 164/71 (10/15/23 0930)  SpO2: 98 % (10/15/23 1100) Vital Signs (24h Range):  Temp:  [97.9 °F (36.6 °C)-98.8 °F (37.1 °C)] 98 °F (36.7 °C)  Pulse:  [56-71] 61  Resp:  [16-20] 16  SpO2:  [95 %-100 %] 98 %  BP: (138-164)/(62-74) 164/71     Weight: 93.9 kg (207 lb)  Body mass index is 27.31 kg/m².    SpO2: 98 %         Intake/Output Summary (Last 24 hours) at 10/15/2023 1508  Last data filed at 10/15/2023 0629  Gross per 24 hour   Intake 600 ml   Output 1200 ml   Net -600 ml       Lines/Drains/Airways       Peripheral Intravenous Line  Duration                  Peripheral IV - Single Lumen 10/13/23 1500 20 G;1 3/4 in Anterior;Right Upper Arm 2 days                     Physical Exam  Constitutional:       General: He is not in acute distress.     Appearance: He is not ill-appearing.   HENT:      Nose: Nose normal.      Mouth/Throat:      Mouth: Mucous membranes are moist.   Eyes:      Pupils: Pupils are equal, round, and reactive to light.   Neck:      Comments: No JVD appreciated  "  Cardiovascular:      Rate and Rhythm: Normal rate and regular rhythm.      Pulses: Normal pulses.      Heart sounds: No murmur heard.  Pulmonary:      Effort: Pulmonary effort is normal. No respiratory distress.      Breath sounds: No wheezing or rales.   Abdominal:      General: Abdomen is flat. There is no distension.      Palpations: Abdomen is soft.      Tenderness: There is no right CVA tenderness or left CVA tenderness.   Musculoskeletal:         General: No swelling.      Cervical back: Normal range of motion and neck supple. No tenderness.      Right lower leg: No edema.      Left lower leg: No edema.   Skin:     General: Skin is warm.      Coloration: Skin is not pale.      Findings: No rash.   Neurological:      General: No focal deficit present.      Mental Status: He is alert and oriented to person, place, and time.      Motor: No weakness.          Significant Labs: ABG: No results for input(s): "PH", "PCO2", "HCO3", "POCSATURATED", "BE" in the last 48 hours., Blood Culture:   Recent Labs   Lab 10/13/23  2046 10/13/23  2047   LABBLOO No Growth to date  No Growth to date No Growth to date  No Growth to date   , BMP:   Recent Labs   Lab 10/14/23  0522 10/14/23  1926 10/15/23  0439   GLU 81 104 84    142 141   K 3.3* 3.8 3.8    108 107   CO2 20* 19* 25   BUN 36* 32* 32*   CREATININE 4.2* 4.4* 4.5*   CALCIUM 7.7* 7.7* 7.7*   MG 1.9 1.9 2.0   , CMP   Recent Labs   Lab 10/14/23  0522 10/14/23  1926 10/15/23  0439    142 141   K 3.3* 3.8 3.8    108 107   CO2 20* 19* 25   GLU 81 104 84   BUN 36* 32* 32*   CREATININE 4.2* 4.4* 4.5*   CALCIUM 7.7* 7.7* 7.7*   PROT 5.9*  --  5.6*   ALBUMIN 2.6*  --  2.5*   BILITOT 0.3  --  0.3   ALKPHOS 60  --  55   AST 17  --  17   ALT 14  --  16   ANIONGAP 13 15 9   , CBC   Recent Labs   Lab 10/14/23  0522 10/14/23  1926 10/15/23  0439   WBC 7.02 7.39 8.34   HGB 7.1* 7.2* 7.0*   HCT 22.2* 22.7* 22.3*   * 164 141*   , INR No results for " "input(s): "INR", "PROTIME" in the last 48 hours., Lipid Panel No results for input(s): "CHOL", "HDL", "LDLCALC", "TRIG", "CHOLHDL" in the last 48 hours.,   Pathology Results  (Last 10 years)                 03/03/21 1340  Specimen to Pathology, Surgery Gastrointestinal tract Final result    Narrative:  Pre-op Diagnosis: History of adenomatous polyp of colon   [Z86.010]   Current use of long term anticoagulation [Z79.01]   Procedure(s):   COLONOSCOPY   Number of specimens: 1   Name of specimens:   jar1) sigmoid colon biopsy r/o IBD/dysplasia   Release to patient->7 Day Delay   Specimen total (fresh, frozen, permanent):->1           , Troponin   Recent Labs   Lab 10/13/23  2046 10/14/23  1025 10/14/23  1517   TROPONINI 0.058* 0.058*  0.058* 0.049*   , and All pertinent lab results from the last 24 hours have been reviewed.       Assessment and Plan:     Chest pain  67-year-old male with paroxysmal AFib, HFpEF, kidney transplant, COPD, CAD presenting with shortness of breath, cardiology consulted for chest pain.     Patient chest pain is burning in quality med to chest without radiation.  Was relieved with nitro during both episodes.  However similar pain at home is usually relieved with PPI.  Recent PET small area reversible ischemia.  Patient is a kidney transplant with recent creatinine 4.5.  -- given above findings encouraged medical therapy with increasing metoprolol succinate to 25 mg  -- starting amlodipine 10 mg ( antianginal qualities)   -- consider adding PPI, or GI cocktail.  -- continue diuresis, please read shortness of breath below.  -- consider adding isosorbide dinitrate if recurrent pain.    Please feel free to call us for any questions or if patient's pain is uncontrolled.    SOB (shortness of breath)  Presentation patient had a BNP of 1100, troponins were flat, no EKG changes.  Chest x-ray concerning for right lower lobe opacities and CT with pulmonary edema.  Patient was started on antibiotics and " Lasix IV 60 mg daily.  Patient is on 20 mg p.o. Lasix every other day at home.      -- TTE pending  -- patient will benefit from further diuresis, can consider Lasix 40 mg p.o. b.i.d..        VTE Risk Mitigation (From admission, onward)         Ordered     apixaban tablet 5 mg  2 times daily         10/13/23 9186                Thank you for your consult. I will sign off. Please contact us if you have any additional questions.    Holden Pacheco MD  Cardiology   [facility] - Telemetry Stepdown

## 2023-10-15 NOTE — ASSESSMENT & PLAN NOTE
Presentation patient had a BNP of 1100, troponins were flat, no EKG changes.  Chest x-ray concerning for right lower lobe opacities and CT with pulmonary edema.  Patient was started on antibiotics and Lasix IV 60 mg daily.  Patient is on 20 mg p.o. Lasix every other day at home.      -- TTE pending  -- patient will benefit from further diuresis, can consider Lasix 40 mg p.o. b.i.d..

## 2023-10-15 NOTE — HPI
67 yr old AAM with paroxysmal Afib s/p cardioversion 3/2022 (on Eliquis, Metoprolol, and Amiodarone),  HFpEF (EF 65%), kidney transplant in 4/12/2005 (bcr of 2.8-3.2), COPD, CAD s/p PCI in 2006, HTN, tobacco abuse presented with shortness of breath.  Cardiology consulted for recurrent chest pain.  Noted to have elevated BNP, and  CXR showing RLL consolidation with CT chest findings compatible with pulmonary edema.  Patient was admitted and started on IV Lasix and antibiotics.  During his hospital stay has recurrent chest pain has required nitro x2.  Chest pain occurs randomly during rest, burning in quality over mid to right chest without radiation.  Patient has similar recurrent pain at home, that usually improves after omeprazole.  In addition patient has been having recurrent episodes of shortness of breath on exertion, follows up with Dr. Vail.  Today he denies shortness of breath, palpitations, presyncope, syncope, leg swelling, PND, or orthopnea.            Of note, patient follows with Dr. Vail with PET stress performed on 9/13/2023   - showing small sized, mild intensity apical inferior and inferoseptal reversible perfusion abnormality involving 6% of LV myocardium indicative of focal coronary stenosis.  -CT attenuation images demonstrate severe diffuse coronary calcifications in the LAD, and moderate diffuse coronary calcifications in the LCX and RCA territory and mild diffuse aortic calcifications in the descending aorta      TTE 2/2023  The left ventricle is normal in size with normal systolic function. The estimated ejection fraction is 65%.  Normal right ventricular size with normal right ventricular systolic function.  Grade II left ventricular diastolic dysfunction.  Severe left atrial enlargement.  There is mild aortic valve stenosis. Aortic valve area is 1.8 cm2; peak velocity is 2.9 m/s; mean gradient is 15 mmHg.  Pulmonary HTN - the estimated PA systolic pressure is > 41mm Hg. (The IVC was  not well visualized.)

## 2023-10-15 NOTE — PROGRESS NOTES
"[facility] - Tele[facility]  Progress Note    Patient Name: Ibrahima Phelps  MRN: [id number]  Patient Class: IP- Inpatient   Admission Date: 10/13/2023  Length of Stay: 2 days  Attending Physician: Ricky Judge MD  Primary Care Provider: Anatoliy Colindres MD        Subjective:     Principal Problem:Acute on chronic heart failure with preserved ejection fraction        HPI:  68 yo M active smoker w/ COPD, CAD s/p CABG x1 in 2006, paroxysmal Afib s/p cardioversion 3/2022 (on Eliquis, Metoprolol, and Amiodarone), HFpEF (EF 65%; not currently taking diuretics), hiatal hernia with reflux, HTN on Hydralazine, CKD4 s/p kidney transplants in 1992 and 2005 on Tacrolimus and Prednisone, presented to the ED with his wife complaining about worsening shortness of breath/DAUGHERTY; infrequent, nonproductive cough; and severe, sharp L posterior heel pain that started a few days ago. Per the patient, he woke up last night "gasping for air," short of breath, and with substernal chest pain radiating to his back. The chest pain resolved with Omeprazole; denies chest pain at this time. The shortness of breath and infrequent, nonproductive cough typically occur at rest and are worse with walking. The severe, sharp L posterior heel pain is worse with movement and walking, and has not responded to leg elevation. Of note, he has a history of tenosynovitis. He noted significant, watery diarrhea associated with stomach pain all day yesterday that resolved with Peptobismol and water; denies diarrhea this morning. Denies fevers, chills, nausea, vomiting, constipation, dysuria, hematuria, palpitations, headache, or vision changes.     His presenting vital signs were a temperature of 98.3, pulse rate of 57-84 bpm, resp rate of 20-28, blood pressure ranging from 156-169/69-74, MAP of 104-107, and SpO2 % on RA. Troponin (0.081), Magnesium (nml), CBC (Hgb 8.2, platelets 127), CMP (CO2 19, BUN 33, Cr 4.1), and BNP (1,168) were " ordered. CXR showed RLL consolidation. XR L ankle showed calcaneal enthesopathy. CTAP showed trace bilateral pleural effusions, bibasilar dependent pulmonary opacities, new prominence/thickening of the L iliac fossa transplant kidney's ureter. Started on CTX, AZT, and Lasix      Overview/Hospital Course:  Patient admitted for further management. Patient continued having chest pain, cardiology consulted. Patient found to have elevated PA pressures concerning for pHTN which was likely contributing to shortness of breath. KTM consulted and recommended stopping lasix.       Interval History: chest pain resolved with scheduled mylanta. Cr rising. Otherwise VSS    Review of Systems   Constitutional:  Negative for chills, fatigue and fever.   HENT:  Negative for ear pain and sinus pain.    Eyes:  Negative for photophobia, pain and visual disturbance.   Respiratory:  Negative for chest tightness, shortness of breath and wheezing.    Cardiovascular:  Positive for chest pain. Negative for leg swelling.   Gastrointestinal:  Negative for abdominal pain, blood in stool, constipation, diarrhea, nausea and vomiting.   Endocrine: Negative for polydipsia and polyuria.   Genitourinary:  Negative for difficulty urinating, dysuria, flank pain, frequency, penile pain and testicular pain.   Musculoskeletal:  Negative for back pain, neck pain and neck stiffness.   Skin:  Negative for color change and rash.   Neurological:  Negative for dizziness, syncope, weakness, numbness and headaches.   Psychiatric/Behavioral:  Negative for confusion and sleep disturbance.      Objective:     Vital Signs (Most Recent):  Temp: 98 °F (36.7 °C) (10/15/23 0907)  Pulse: 61 (10/15/23 1137)  Resp: 16 (10/15/23 0907)  BP: (!) 164/71 (10/15/23 0930)  SpO2: 98 % (10/15/23 1100) Vital Signs (24h Range):  Temp:  [97.9 °F (36.6 °C)-98.8 °F (37.1 °C)] 98 °F (36.7 °C)  Pulse:  [56-87] 61  Resp:  [16-48] 16  SpO2:  [95 %-100 %] 98 %  BP: (138-170)/() 164/71      Weight: 93.9 kg (207 lb)  Body mass index is 27.31 kg/m².    Intake/Output Summary (Last 24 hours) at 10/15/2023 1411  Last data filed at 10/15/2023 0629  Gross per 24 hour   Intake 600 ml   Output 1200 ml   Net -600 ml         Physical Exam  Constitutional:       General: He is not in acute distress.     Appearance: He is not ill-appearing.   HENT:      Head: Normocephalic and atraumatic.      Right Ear: External ear normal.      Left Ear: External ear normal.      Nose: Nose normal. No congestion or rhinorrhea.      Mouth/Throat:      Mouth: Mucous membranes are dry.      Pharynx: Oropharynx is clear. No oropharyngeal exudate or posterior oropharyngeal erythema.   Eyes:      General: No scleral icterus.        Right eye: No discharge.         Left eye: No discharge.      Extraocular Movements: Extraocular movements intact.      Conjunctiva/sclera: Conjunctivae normal.   Cardiovascular:      Rate and Rhythm: Normal rate and regular rhythm.      Pulses: Normal pulses.      Heart sounds: Normal heart sounds. No murmur heard.     No gallop.   Pulmonary:      Effort: No respiratory distress.      Breath sounds: Normal breath sounds. No stridor. No wheezing or rales.   Chest:      Chest wall: No tenderness.   Abdominal:      General: Abdomen is flat. Bowel sounds are normal. There is no distension.      Palpations: Abdomen is soft. There is no mass.      Tenderness: There is no abdominal tenderness. There is no guarding or rebound.      Hernia: No hernia is present.   Musculoskeletal:         General: No swelling, tenderness or deformity. Normal range of motion.      Cervical back: Normal range of motion. No rigidity or tenderness.      Right lower leg: Edema present.      Left lower leg: Edema present.   Skin:     General: Skin is warm and dry.      Capillary Refill: Capillary refill takes less than 2 seconds.      Coloration: Skin is not jaundiced.      Findings: No bruising, erythema or rash.   Neurological:       General: No focal deficit present.      Mental Status: He is alert and oriented to person, place, and time.      Cranial Nerves: No cranial nerve deficit.      Sensory: No sensory deficit.      Motor: No weakness.   Psychiatric:         Mood and Affect: Mood normal.         Behavior: Behavior normal.         Thought Content: Thought content normal.         Judgment: Judgment normal.             Significant Labs: All pertinent labs within the past 24 hours have been reviewed.    Significant Imaging: I have reviewed all pertinent imaging results/findings within the past 24 hours.      Assessment/Plan:      * Acute on chronic heart failure with preserved ejection fraction  Patient admitted with shortness of breath, edema, PND, orthopnea, consistent with acute on chronic diastolic CHF exacerbation.  Last TTE in February of 2023; EF 65% with Grade II Diastolic Dysfunction    BNP: BNP  Recent Labs   Lab 10/13/23  0836   BNP 1,168*     CXR: No significant pleural fluid, evidence of patchy consolidation    Troponin:   Recent Labs   Lab 10/14/23  1517   TROPONINI 0.049*       Home Meds:  Prescribed Lasix 40mg though not taking, states he took 20mg every other day over past week 2/2 swelling    Plan:  - hold lasix  - Strict ins and outs  - TTE pending   - Continue metoprolol      Unstable angina pectoris  Cardiology consulted. Unlikely acs. Likely due to gerd and pHTN.       Consolidation lung  Patchy consolidation observed on CXR in ED. CT was largely unremarkable. Patient has worsening renal function.     - d/c antibiotics      CKD (chronic kidney disease), stage IV  Cr. Today was 4.1, baseline around 3.7-4. Managed by nephrology outpatient    - avoid nephrotoxic drugs and renally dose medications  - strict I/Os      Anemia in stage 4 chronic kidney disease  Hgb 8.2 on admit, around baseline    Lab Results   Component Value Date    IRON 54 02/10/2023    TIBC 142 (L) 02/10/2023    FERRITIN 1,911 (H) 02/20/2023     Lab  "Results   Component Value Date    FOLATE 8.8 02/28/2023     Lab Results   Component Value Date    [id number] 278 06/20/2023     No results found for: "RETICCTPCT"    Likely secondary to CKD    Plan:   -   Lab Results   Component Value Date    HGB 7.0 (L) 10/15/2023     - Transfuse if Hgb < 7       Immunodeficiency due to treatment with immunosuppressive medication  See H/o Kidney transplant    Chronic obstructive pulmonary disease  Pt complaining of cough and shortness of breath, duration 3 days  Saturating appropriately on room air, no wheezing or abnormal breath sounds on auscultation     - home duonebs      Paroxysmal atrial fibrillation  Patient with Long standing persistent (>12 months) atrial fibrillation which is controlled currently with Beta Blocker and Amiodarone. Patient is currently in sinus rhythm.[id number] Score: 2. . Anticoagulation indicated. Anticoagulation done with apixaban.  S/p cardioversion in March of 2022    - Continue amio, metoprolol, and apixaban  - monitor on telemetry    H/O kidney transplant  Kidney transplant in 1996 and 2003  Cr elevated to 4.1 on admission, around patients baseline     - Continue home tacrolimus and prednisone  - Tacro level low  - KTM consulted      Long term (current) use of anticoagulants [V58.61]  Continue apixaban      Mixed hyperlipidemia  Chronic and stable    - continue statin      CAD (coronary artery disease)  CABG in 2005    - Continue ASA and Statin  - Slight troponin elevation on admission, will trend      VTE Risk Mitigation (From admission, onward)         Ordered     apixaban tablet 5 mg  2 times daily         10/13/23 1127                Discharge Planning   RAMU:      Code Status: Full Code   Is the patient medically ready for discharge?:     Reason for patient still in hospital (select all that apply): Patient trending condition, Laboratory test and Consult recommendations  Discharge Plan A: Home with family                  Randal Beaver, " MD  Department of Hospital Medicine   [facility] - Telemetry Stepdown

## 2023-10-15 NOTE — ASSESSMENT & PLAN NOTE
Patchy consolidation observed on CXR in ED. CT was largely unremarkable. Patient has worsening renal function.     - d/c antibiotics

## 2023-10-15 NOTE — ASSESSMENT & PLAN NOTE
Patient admitted with shortness of breath, edema, PND, orthopnea, consistent with acute on chronic diastolic CHF exacerbation.  Last TTE in February of 2023; EF 65% with Grade II Diastolic Dysfunction    BNP: BNP  Recent Labs   Lab 10/13/23  0836   BNP 1,168*     CXR: No significant pleural fluid, evidence of patchy consolidation    Troponin:   Recent Labs   Lab 10/14/23  1517   TROPONINI 0.049*       Home Meds:  Prescribed Lasix 40mg though not taking, states he took 20mg every other day over past week 2/2 swelling    Plan:  - hold lasix  - Strict ins and outs  - TTE pending   - Continue metoprolol

## 2023-10-15 NOTE — ASSESSMENT & PLAN NOTE
67-year-old male with paroxysmal AFib, HFpEF, kidney transplant, COPD, CAD presenting with shortness of breath, cardiology consulted for chest pain.     Patient chest pain is burning in quality med to chest without radiation.  Was relieved with nitro during both episodes.  However similar pain at home is usually relieved with PPI.  Recent PET small area reversible ischemia.  Patient is a kidney transplant with recent creatinine 4.5.  -- given above findings encouraged medical therapy with increasing metoprolol succinate to 25 mg  -- starting amlodipine 10 mg ( antianginal qualities)   -- consider adding PPI, or GI cocktail.  -- continue diuresis, please read shortness of breath below.  -- consider adding isosorbide dinitrate if recurrent pain.    Please feel free to call us for any questions or if patient's pain is uncontrolled.

## 2023-10-15 NOTE — CARE UPDATE
RAPID RESPONSE NURSE FOLLOW-UP NOTE       Followed up with patient for proactive rounding.  No acute issues at this time. No new chest pain, vital signs stable. Reviewed plan of care with charge Denia BEVERLY .   Please call Rapid Response DANISH BEVELRY RN with any questions or concerns at [phone].

## 2023-10-16 LAB
ABO + RH BLD: NORMAL
ALBUMIN SERPL BCP-MCNC: 2.4 G/DL (ref 3.5–5.2)
ALP SERPL-CCNC: 52 U/L (ref 55–135)
ALT SERPL W/O P-5'-P-CCNC: 16 U/L (ref 10–44)
ANION GAP SERPL CALC-SCNC: 11 MMOL/L (ref 8–16)
ANION GAP SERPL CALC-SCNC: 13 MMOL/L (ref 8–16)
APTT PPP: 31 SEC (ref 21–32)
AST SERPL-CCNC: 18 U/L (ref 10–40)
BASOPHILS # BLD AUTO: 0.02 K/UL (ref 0–0.2)
BASOPHILS NFR BLD: 0.3 % (ref 0–1.9)
BILIRUB SERPL-MCNC: 0.4 MG/DL (ref 0.1–1)
BLD GP AB SCN CELLS X3 SERPL QL: NORMAL
BLD PROD TYP BPU: NORMAL
BLOOD UNIT EXPIRATION DATE: NORMAL
BLOOD UNIT TYPE CODE: 7300
BLOOD UNIT TYPE: NORMAL
BUN SERPL-MCNC: 34 MG/DL (ref 8–23)
BUN SERPL-MCNC: 36 MG/DL (ref 8–23)
CALCIUM SERPL-MCNC: 7.4 MG/DL (ref 8.7–10.5)
CALCIUM SERPL-MCNC: 7.5 MG/DL (ref 8.7–10.5)
CHLORIDE SERPL-SCNC: 106 MMOL/L (ref 95–110)
CHLORIDE SERPL-SCNC: 109 MMOL/L (ref 95–110)
CO2 SERPL-SCNC: 21 MMOL/L (ref 23–29)
CO2 SERPL-SCNC: 22 MMOL/L (ref 23–29)
CODING SYSTEM: NORMAL
CREAT SERPL-MCNC: 4.4 MG/DL (ref 0.5–1.4)
CREAT SERPL-MCNC: 4.7 MG/DL (ref 0.5–1.4)
CROSSMATCH INTERPRETATION: NORMAL
DIFFERENTIAL METHOD: ABNORMAL
DISPENSE STATUS: NORMAL
EOSINOPHIL # BLD AUTO: 0.6 K/UL (ref 0–0.5)
EOSINOPHIL NFR BLD: 8.6 % (ref 0–8)
ERYTHROCYTE [DISTWIDTH] IN BLOOD BY AUTOMATED COUNT: 18.2 % (ref 11.5–14.5)
EST. GFR  (NO RACE VARIABLE): 12.8 ML/MIN/1.73 M^2
EST. GFR  (NO RACE VARIABLE): 13.9 ML/MIN/1.73 M^2
GLUCOSE SERPL-MCNC: 82 MG/DL (ref 70–110)
GLUCOSE SERPL-MCNC: 96 MG/DL (ref 70–110)
HCT VFR BLD AUTO: 21.8 % (ref 40–54)
HGB BLD-MCNC: 6.9 G/DL (ref 14–18)
IMM GRANULOCYTES # BLD AUTO: 0.02 K/UL (ref 0–0.04)
IMM GRANULOCYTES NFR BLD AUTO: 0.3 % (ref 0–0.5)
INR PPP: 1.2 (ref 0.8–1.2)
LACTATE SERPL-SCNC: 1.3 MMOL/L (ref 0.5–2.2)
LYMPHOCYTES # BLD AUTO: 1.9 K/UL (ref 1–4.8)
LYMPHOCYTES NFR BLD: 26 % (ref 18–48)
MAGNESIUM SERPL-MCNC: 2.2 MG/DL (ref 1.6–2.6)
MCH RBC QN AUTO: 24.9 PG (ref 27–31)
MCHC RBC AUTO-ENTMCNC: 31.7 G/DL (ref 32–36)
MCV RBC AUTO: 79 FL (ref 82–98)
MONOCYTES # BLD AUTO: 0.7 K/UL (ref 0.3–1)
MONOCYTES NFR BLD: 9 % (ref 4–15)
NEUTROPHILS # BLD AUTO: 4.2 K/UL (ref 1.8–7.7)
NEUTROPHILS NFR BLD: 55.8 % (ref 38–73)
NRBC BLD-RTO: 0 /100 WBC
NUM UNITS TRANS PACKED RBC: NORMAL
PHOSPHATE SERPL-MCNC: 4.9 MG/DL (ref 2.7–4.5)
PLATELET # BLD AUTO: 152 K/UL (ref 150–450)
PMV BLD AUTO: 12.6 FL (ref 9.2–12.9)
POTASSIUM SERPL-SCNC: 3.4 MMOL/L (ref 3.5–5.1)
POTASSIUM SERPL-SCNC: 3.8 MMOL/L (ref 3.5–5.1)
PROT SERPL-MCNC: 5.4 G/DL (ref 6–8.4)
PROTHROMBIN TIME: 12.3 SEC (ref 9–12.5)
RBC # BLD AUTO: 2.77 M/UL (ref 4.6–6.2)
SODIUM SERPL-SCNC: 141 MMOL/L (ref 136–145)
SODIUM SERPL-SCNC: 141 MMOL/L (ref 136–145)
SPECIMEN OUTDATE: NORMAL
TACROLIMUS BLD-MCNC: 2.9 NG/ML (ref 5–15)
TROPONIN I SERPL DL<=0.01 NG/ML-MCNC: 0.08 NG/ML (ref 0–0.03)
WBC # BLD AUTO: 7.46 K/UL (ref 3.9–12.7)

## 2023-10-16 PROCEDURE — P9016 RBC LEUKOCYTES REDUCED: HCPCS

## 2023-10-16 PROCEDURE — 99497 PR ADVNCD CARE PLAN 30 MIN: ICD-10-PCS | Mod: 25,,, | Performed by: CLINICAL NURSE SPECIALIST

## 2023-10-16 PROCEDURE — 85610 PROTHROMBIN TIME: CPT

## 2023-10-16 PROCEDURE — 25000003 PHARM REV CODE 250

## 2023-10-16 PROCEDURE — 63600175 PHARM REV CODE 636 W HCPCS

## 2023-10-16 PROCEDURE — 63600175 PHARM REV CODE 636 W HCPCS: Performed by: INTERNAL MEDICINE

## 2023-10-16 PROCEDURE — 85025 COMPLETE CBC W/AUTO DIFF WBC: CPT

## 2023-10-16 PROCEDURE — 99233 PR SUBSEQUENT HOSPITAL CARE,LEVL III: ICD-10-PCS | Mod: 25,GC,, | Performed by: INTERNAL MEDICINE

## 2023-10-16 PROCEDURE — 36415 COLL VENOUS BLD VENIPUNCTURE: CPT

## 2023-10-16 PROCEDURE — 99223 1ST HOSP IP/OBS HIGH 75: CPT | Mod: ,,, | Performed by: CLINICAL NURSE SPECIALIST

## 2023-10-16 PROCEDURE — 85730 THROMBOPLASTIN TIME PARTIAL: CPT

## 2023-10-16 PROCEDURE — 83605 ASSAY OF LACTIC ACID: CPT | Performed by: STUDENT IN AN ORGANIZED HEALTH CARE EDUCATION/TRAINING PROGRAM

## 2023-10-16 PROCEDURE — 36415 COLL VENOUS BLD VENIPUNCTURE: CPT | Performed by: STUDENT IN AN ORGANIZED HEALTH CARE EDUCATION/TRAINING PROGRAM

## 2023-10-16 PROCEDURE — 80048 BASIC METABOLIC PNL TOTAL CA: CPT | Mod: XB | Performed by: STUDENT IN AN ORGANIZED HEALTH CARE EDUCATION/TRAINING PROGRAM

## 2023-10-16 PROCEDURE — 99233 SBSQ HOSP IP/OBS HIGH 50: CPT | Mod: ,,, | Performed by: INTERNAL MEDICINE

## 2023-10-16 PROCEDURE — 99233 SBSQ HOSP IP/OBS HIGH 50: CPT | Mod: 25,GC,, | Performed by: INTERNAL MEDICINE

## 2023-10-16 PROCEDURE — 94640 AIRWAY INHALATION TREATMENT: CPT

## 2023-10-16 PROCEDURE — 84484 ASSAY OF TROPONIN QUANT: CPT | Performed by: STUDENT IN AN ORGANIZED HEALTH CARE EDUCATION/TRAINING PROGRAM

## 2023-10-16 PROCEDURE — 80197 ASSAY OF TACROLIMUS: CPT | Performed by: INTERNAL MEDICINE

## 2023-10-16 PROCEDURE — 84100 ASSAY OF PHOSPHORUS: CPT

## 2023-10-16 PROCEDURE — 85027 COMPLETE CBC AUTOMATED: CPT

## 2023-10-16 PROCEDURE — 36430 TRANSFUSION BLD/BLD COMPNT: CPT

## 2023-10-16 PROCEDURE — 83735 ASSAY OF MAGNESIUM: CPT

## 2023-10-16 PROCEDURE — 25000242 PHARM REV CODE 250 ALT 637 W/ HCPCS: Performed by: STUDENT IN AN ORGANIZED HEALTH CARE EDUCATION/TRAINING PROGRAM

## 2023-10-16 PROCEDURE — 20600001 HC STEP DOWN PRIVATE ROOM

## 2023-10-16 PROCEDURE — 93005 ELECTROCARDIOGRAM TRACING: CPT

## 2023-10-16 PROCEDURE — 94761 N-INVAS EAR/PLS OXIMETRY MLT: CPT

## 2023-10-16 PROCEDURE — 99233 PR SUBSEQUENT HOSPITAL CARE,LEVL III: ICD-10-PCS | Mod: ,,, | Performed by: INTERNAL MEDICINE

## 2023-10-16 PROCEDURE — 25000003 PHARM REV CODE 250: Performed by: STUDENT IN AN ORGANIZED HEALTH CARE EDUCATION/TRAINING PROGRAM

## 2023-10-16 PROCEDURE — 93010 ELECTROCARDIOGRAM REPORT: CPT | Mod: ,,, | Performed by: INTERNAL MEDICINE

## 2023-10-16 PROCEDURE — 93010 EKG 12-LEAD: ICD-10-PCS | Mod: ,,, | Performed by: INTERNAL MEDICINE

## 2023-10-16 PROCEDURE — 80053 COMPREHEN METABOLIC PANEL: CPT

## 2023-10-16 PROCEDURE — 99233 PR SUBSEQUENT HOSPITAL CARE,LEVL III: ICD-10-PCS | Mod: GC,,, | Performed by: STUDENT IN AN ORGANIZED HEALTH CARE EDUCATION/TRAINING PROGRAM

## 2023-10-16 PROCEDURE — 86920 COMPATIBILITY TEST SPIN: CPT

## 2023-10-16 PROCEDURE — 25000242 PHARM REV CODE 250 ALT 637 W/ HCPCS

## 2023-10-16 PROCEDURE — 99497 ADVNCD CARE PLAN 30 MIN: CPT | Mod: 25,,, | Performed by: CLINICAL NURSE SPECIALIST

## 2023-10-16 PROCEDURE — 99223 PR INITIAL HOSPITAL CARE,LEVL III: ICD-10-PCS | Mod: ,,, | Performed by: CLINICAL NURSE SPECIALIST

## 2023-10-16 PROCEDURE — 86900 BLOOD TYPING SEROLOGIC ABO: CPT

## 2023-10-16 PROCEDURE — 99900035 HC TECH TIME PER 15 MIN (STAT)

## 2023-10-16 PROCEDURE — 99233 SBSQ HOSP IP/OBS HIGH 50: CPT | Mod: GC,,, | Performed by: STUDENT IN AN ORGANIZED HEALTH CARE EDUCATION/TRAINING PROGRAM

## 2023-10-16 RX ORDER — HYDRALAZINE HYDROCHLORIDE 20 MG/ML
5 INJECTION INTRAMUSCULAR; INTRAVENOUS ONCE
Status: COMPLETED | OUTPATIENT
Start: 2023-10-16 | End: 2023-10-16

## 2023-10-16 RX ORDER — CLOPIDOGREL 300 MG/1
600 TABLET, FILM COATED ORAL ONCE
Status: COMPLETED | OUTPATIENT
Start: 2023-10-16 | End: 2023-10-16

## 2023-10-16 RX ORDER — CLOPIDOGREL BISULFATE 75 MG/1
75 TABLET ORAL DAILY
Status: DISCONTINUED | OUTPATIENT
Start: 2023-10-17 | End: 2023-10-20 | Stop reason: HOSPADM

## 2023-10-16 RX ORDER — HEPARIN SODIUM,PORCINE/D5W 25000/250
0-40 INTRAVENOUS SOLUTION INTRAVENOUS CONTINUOUS
Status: DISCONTINUED | OUTPATIENT
Start: 2023-10-16 | End: 2023-10-18

## 2023-10-16 RX ORDER — DOXAZOSIN 2 MG/1
4 TABLET ORAL EVERY 12 HOURS
Status: DISCONTINUED | OUTPATIENT
Start: 2023-10-17 | End: 2023-10-20 | Stop reason: HOSPADM

## 2023-10-16 RX ORDER — ASPIRIN 325 MG
325 TABLET ORAL DAILY
Status: DISCONTINUED | OUTPATIENT
Start: 2023-10-16 | End: 2023-10-16

## 2023-10-16 RX ORDER — ASPIRIN 325 MG
325 TABLET ORAL DAILY
Status: COMPLETED | OUTPATIENT
Start: 2023-10-16 | End: 2023-10-16

## 2023-10-16 RX ORDER — NIFEDIPINE 30 MG/1
60 TABLET, EXTENDED RELEASE ORAL 2 TIMES DAILY
Status: DISCONTINUED | OUTPATIENT
Start: 2023-10-16 | End: 2023-10-18

## 2023-10-16 RX ORDER — ACETAMINOPHEN 325 MG/1
650 TABLET ORAL EVERY 6 HOURS PRN
Status: DISCONTINUED | OUTPATIENT
Start: 2023-10-16 | End: 2023-10-20 | Stop reason: HOSPADM

## 2023-10-16 RX ORDER — HYDROCODONE BITARTRATE AND ACETAMINOPHEN 500; 5 MG/1; MG/1
TABLET ORAL
Status: DISCONTINUED | OUTPATIENT
Start: 2023-10-16 | End: 2023-10-18

## 2023-10-16 RX ADMIN — APIXABAN 5 MG: 5 TABLET, FILM COATED ORAL at 09:10

## 2023-10-16 RX ADMIN — ALBUTEROL SULFATE 2.5 MG: 2.5 SOLUTION RESPIRATORY (INHALATION) at 08:10

## 2023-10-16 RX ADMIN — METOPROLOL SUCCINATE 25 MG: 25 TABLET, EXTENDED RELEASE ORAL at 09:10

## 2023-10-16 RX ADMIN — NITROGLYCERIN 0.4 MG: 0.4 TABLET, ORALLY DISINTEGRATING SUBLINGUAL at 10:10

## 2023-10-16 RX ADMIN — TACROLIMUS 3 MG: 1 CAPSULE ORAL at 09:10

## 2023-10-16 RX ADMIN — FAMOTIDINE 20 MG: 20 TABLET ORAL at 09:10

## 2023-10-16 RX ADMIN — ALUMINUM HYDROXIDE, MAGNESIUM HYDROXIDE, AND DIMETHICONE 30 ML: 400; 400; 40 SUSPENSION ORAL at 01:10

## 2023-10-16 RX ADMIN — ALBUTEROL SULFATE 2.5 MG: 2.5 SOLUTION RESPIRATORY (INHALATION) at 12:10

## 2023-10-16 RX ADMIN — ASPIRIN 325 MG ORAL TABLET 325 MG: 325 PILL ORAL at 01:10

## 2023-10-16 RX ADMIN — PREDNISONE 5 MG: 5 TABLET ORAL at 09:10

## 2023-10-16 RX ADMIN — ATORVASTATIN CALCIUM 10 MG: 10 TABLET, FILM COATED ORAL at 09:10

## 2023-10-16 RX ADMIN — ISOSORBIDE DINITRATE: 20 TABLET ORAL at 08:10

## 2023-10-16 RX ADMIN — ISOSORBIDE DINITRATE: 20 TABLET ORAL at 09:10

## 2023-10-16 RX ADMIN — ISOSORBIDE DINITRATE: 20 TABLET ORAL at 04:10

## 2023-10-16 RX ADMIN — HYDRALAZINE HYDROCHLORIDE 5 MG: 20 INJECTION, SOLUTION INTRAMUSCULAR; INTRAVENOUS at 02:10

## 2023-10-16 RX ADMIN — NIFEDIPINE 60 MG: 30 TABLET, FILM COATED, EXTENDED RELEASE ORAL at 08:10

## 2023-10-16 RX ADMIN — AMIODARONE HYDROCHLORIDE 200 MG: 200 TABLET ORAL at 09:10

## 2023-10-16 RX ADMIN — TACROLIMUS 3 MG: 1 CAPSULE ORAL at 06:10

## 2023-10-16 RX ADMIN — HYDRALAZINE HYDROCHLORIDE 5 MG: 20 INJECTION, SOLUTION INTRAMUSCULAR; INTRAVENOUS at 01:10

## 2023-10-16 RX ADMIN — HEPARIN SODIUM 12 UNITS/KG/HR: 10000 INJECTION, SOLUTION INTRAVENOUS at 09:10

## 2023-10-16 RX ADMIN — NIFEDIPINE 60 MG: 30 TABLET, FILM COATED, EXTENDED RELEASE ORAL at 01:10

## 2023-10-16 RX ADMIN — CLOPIDOGREL BISULFATE 600 MG: 300 TABLET, FILM COATED ORAL at 01:10

## 2023-10-16 NOTE — ASSESSMENT & PLAN NOTE
"Hgb 8.2 on admit, around baseline    Lab Results   Component Value Date    IRON 54 02/10/2023    TIBC 142 (L) 02/10/2023    FERRITIN 1,911 (H) 02/20/2023     Lab Results   Component Value Date    FOLATE 8.8 02/28/2023     Lab Results   Component Value Date    [id number] 278 06/20/2023     No results found for: "RETICCTPCT"    Likely secondary to CKD    Plan:   -   Lab Results   Component Value Date    HGB 6.9 (L) 10/16/2023     - Transfusion of 1u pRBCs on 10/16  "

## 2023-10-16 NOTE — ASSESSMENT & PLAN NOTE
Having chest pain during admission, notable episode on 10/16; troponin obtained: elevated from days prior, will trend. EKG significant for ST changes. Cardiology following    - ACS protocol initiated.   - Holding heparin gtt until later this evening to allow for apixaban washout, Start drip sooner if pt having CP   - If patient is complaining of chest pain, inform cardiology  - ASA and Plavix loading dose, followed by maintenance therapy  - Start metoprolol 25 mg  - Continue atorvastatin 10 mg

## 2023-10-16 NOTE — SUBJECTIVE & OBJECTIVE
Interval History: New episode of chest pain, cardio starting ACS protocol; plans for PET. Pt transfused 1u pRBCs. Palliative consulted    Review of Systems   Constitutional:  Negative for chills, fatigue and fever.   HENT:  Negative for ear pain and sinus pain.    Eyes:  Negative for photophobia, pain and visual disturbance.   Respiratory:  Negative for chest tightness, shortness of breath and wheezing.    Cardiovascular:  Positive for chest pain. Negative for leg swelling.   Gastrointestinal:  Negative for abdominal pain, blood in stool, constipation, diarrhea, nausea and vomiting.   Endocrine: Negative for polydipsia and polyuria.   Genitourinary:  Negative for difficulty urinating, dysuria, flank pain, frequency, penile pain and testicular pain.   Musculoskeletal:  Negative for back pain, neck pain and neck stiffness.   Skin:  Negative for color change and rash.   Neurological:  Negative for dizziness, syncope, weakness, numbness and headaches.   Psychiatric/Behavioral:  Negative for confusion and sleep disturbance.      Objective:     Vital Signs (Most Recent):  Temp: 98 °F (36.7 °C) (10/16/23 1100)  Pulse: 68 (10/16/23 1214)  Resp: 14 (10/16/23 1214)  BP: (!) 161/67 (10/16/23 1100)  SpO2: 96 % (10/16/23 1214) Vital Signs (24h Range):  Temp:  [97.8 °F (36.6 °C)-98.7 °F (37.1 °C)] 98 °F (36.7 °C)  Pulse:  [53-78] 68  Resp:  [14-20] 14  SpO2:  [96 %-100 %] 96 %  BP: (145-202)/(63-88) 161/67     Weight: 93.9 kg (207 lb 0.2 oz)  Body mass index is 27.31 kg/m².    Intake/Output Summary (Last 24 hours) at 10/16/2023 1402  Last data filed at 10/16/2023 0140  Gross per 24 hour   Intake --   Output 420 ml   Net -420 ml           Physical Exam  Constitutional:       General: He is not in acute distress.     Appearance: He is not ill-appearing.   HENT:      Head: Normocephalic and atraumatic.      Right Ear: External ear normal.      Left Ear: External ear normal.      Nose: Nose normal. No congestion or rhinorrhea.       Mouth/Throat:      Mouth: Mucous membranes are dry.      Pharynx: Oropharynx is clear. No oropharyngeal exudate or posterior oropharyngeal erythema.   Eyes:      General: No scleral icterus.        Right eye: No discharge.         Left eye: No discharge.      Extraocular Movements: Extraocular movements intact.      Conjunctiva/sclera: Conjunctivae normal.   Cardiovascular:      Rate and Rhythm: Normal rate and regular rhythm.      Pulses: Normal pulses.      Heart sounds: Normal heart sounds. No murmur heard.     No gallop.   Pulmonary:      Effort: No respiratory distress.      Breath sounds: Normal breath sounds. No stridor. No wheezing or rales.   Chest:      Chest wall: No tenderness.   Abdominal:      General: Abdomen is flat. Bowel sounds are normal. There is no distension.      Palpations: Abdomen is soft. There is no mass.      Tenderness: There is no abdominal tenderness. There is no guarding or rebound.      Hernia: No hernia is present.   Musculoskeletal:         General: No swelling, tenderness or deformity. Normal range of motion.      Cervical back: Normal range of motion. No rigidity or tenderness.      Right lower leg: Edema present.      Left lower leg: Edema present.   Skin:     General: Skin is warm and dry.      Capillary Refill: Capillary refill takes less than 2 seconds.      Coloration: Skin is not jaundiced.      Findings: No bruising, erythema or rash.   Neurological:      General: No focal deficit present.      Mental Status: He is alert and oriented to person, place, and time.      Cranial Nerves: No cranial nerve deficit.      Sensory: No sensory deficit.      Motor: No weakness.   Psychiatric:         Mood and Affect: Mood normal.         Behavior: Behavior normal.         Thought Content: Thought content normal.         Judgment: Judgment normal.             Significant Labs: All pertinent labs within the past 24 hours have been reviewed.    Significant Imaging: I have reviewed all  pertinent imaging results/findings within the past 24 hours.

## 2023-10-16 NOTE — ASSESSMENT & PLAN NOTE
Echo    Interpretation Summary    Left Ventricle: The left ventricle is normal in size. Ventricular mass is normal. Normal wall thickness. Normal wall motion. There is normal systolic function with a visually estimated ejection fraction of 60 - 65%. Grade II diastolic dysfunction.    Left Atrium: Left atrium is severely dilated.    Right Ventricle: Normal right ventricular cavity size. Wall thickness is normal. Right ventricle wall motion  is normal. Systolic function is normal.    Right Atrium: Right atrium is dilated.    Aortic Valve: Moderately calcified cusps. There is moderate annular calcification present. Mildly restricted motion. There is mild stenosis. Aortic valve area by VTI is 1.85 cm². Aortic valve peak velocity is 2.12 m/s. Mean gradient is 9 mmHg. The dimensionless index is 0.49. There is mild to moderate aortic regurgitation.    Pulmonary Artery: The estimated pulmonary artery systolic pressure is 59 mmHg.    IVC/SVC: Normal venous pressure at 3 mmHg.    Patient is euvolemic, no JVD, lung exam clear to auscultation, 1+ pitting edema bilateral LE, Echo revealing normal venous pressure.     Plan:   - Ok to hold lasix 2/2 to elevated creatinine and per KTM.   - Continue hemodynamic management.  - continue BilDil   - Continue metoprolol 25 mg, nifedipine 60 mg

## 2023-10-16 NOTE — CONSULTS
Palliative care consulted for goals of care and advanced care planning as discussed with primary team resident, for Mr. Ibrahima Phelps.   Mr. Phelps known to pal med from previous admissions.      Advance Care Planning   - No ACP documents received  - Mr. Phelps appears able to make medical decisions   - next of kin is wife Patti Phelps [phone]   - full code per primary team     Goals of Care  - pal med met with patient and wife at bedside   - pal med introduced.  Patient is amenable to pal med.  -  states understanding his current clinical condition and has stopped taking his Cellcept at least a year ago.  He states knowing he will not get another transplant.   - He is hoping he will get well enough to go home and return to his baseline.   - state this is God's plan and he freely accepts this.   - briefly discussed resuscitation status and he is not amenable to conversation today.    - emotional support provided    Symptom Management   - no c/o pain,nausea  - mild shortness of breath occasionally on exertion     PlanRecommendations   - continue current plan of care   - amenable to pal med follow up - will facilitate referral   - emotional support provided     Dr. Judge primary team attending and resident notified of the above.      Full consult note to follow     Thank you for consult and opportunity to participate in Mr. Phelps's care

## 2023-10-16 NOTE — PROGRESS NOTES
[facility] - Telemetry Stepdown  Transplant Nephrology  Progress Note    Patient Name: Ibrahima Phelps  MRN: [id number]  Admission Date: 10/13/2023  Hospital Length of Stay: 3 days  Attending Provider: Ricky Judge MD   Primary Care Physician: Anatoliy Colindres MD  Principal Problem:Acute on chronic heart failure with preserved ejection fraction    Consults  Subjective:     Interval History: 67 yr old AAM with hx of kidney transplant in 4/12/2005 (bcr of 2.8-3.2), COPD, CAD s/p CABG in 2006 with active smoking, AFIB on amiodarone/Eliquis, HTN who is admitted for shortness of breath as well as dyspnea on exertion. Patient denies any fever but does admit to some congestion. Positive for cough but negative for nausea, vomiting, abdominal pain. Denies any diarrhea or swelling of his legs. Of note, patient admits to chest pain (present earlier this morning as well).    On arrival patient noted to be afebrile, mildly hypertensive with 100% on RA. Labs without any leukocytosis, creatinine of 4.1, BNP of 1168 with CXR showing RLL consolidation with CT chest findings compatible with pulmonary edema, severe coronary disease. Patient started on lasix IV with net negative 900 L.     Overnight: Patient reports headache frontal aching with no other associated symptoms at this time, reports intermittent CP left sternum    Review of patient's allergies indicates:   Allergen Reactions    Ace inhibitors Swelling    Verapamil Other (See Comments)     Other reaction(s): Unknown    Povidone-iodine Itching     Current Facility-Administered Medications   Medication Frequency    albuterol sulfate nebulizer solution 2.5 mg Q4H    aluminum & magnesium hydroxide-simethicone 400-400-40 mg/5 mL suspension 30 mL Q6H    amiodarone tablet 200 mg Daily    amLODIPine tablet 5 mg Daily    apixaban tablet 5 mg BID    aspirin EC tablet 81 mg Daily    atorvastatin tablet 10 mg Daily    dextrose 10% bolus 125 mL 125 mL PRN    dextrose 10% bolus 250 mL 250 mL  PRN    famotidine tablet 20 mg Daily    glucagon (human recombinant) injection 1 mg PRN    glucose chewable tablet 16 g PRN    glucose chewable tablet 24 g PRN    hydrALAZINE 10 mg 1 tablet, hydrALAZINE 25 mg 1 tablet, isorsorbide dinitrate 20 mg 1 tablet combination TID    melatonin tablet 6 mg Nightly PRN    metoprolol succinate (TOPROL-XL) 24 hr tablet 25 mg Daily    naloxone 0.4 mg/mL injection 0.02 mg PRN    nitroGLYCERIN SL tablet 0.4 mg Q5 Min PRN    ondansetron disintegrating tablet 8 mg Q8H PRN    predniSONE tablet 5 mg Daily    prochlorperazine injection Soln 5 mg Q6H PRN    sodium chloride 0.9% flush 10 mL Q12H PRN    tacrolimus capsule 3 mg BID       Objective:     Vital Signs (Most Recent):  Temp: 98 °F (36.7 °C) (10/16/23 0711)  Pulse: 73 (10/16/23 0711)  Resp: 20 (10/16/23 0711)  BP: (!) 145/63 (10/16/23 0711)  SpO2: 100 % (10/16/23 0711) Vital Signs (24h Range):  Temp:  [97.8 °F (36.6 °C)-98.7 °F (37.1 °C)] 98 °F (36.7 °C)  Pulse:  [53-73] 73  Resp:  [16-20] 20  SpO2:  [97 %-100 %] 100 %  BP: (145-202)/(63-88) 145/63     Weight: 93.9 kg (207 lb 0.2 oz) (10/16/23 0400)  Body mass index is 27.31 kg/m².  Body surface area is 2.2 meters squared.    I/O last 3 completed shifts:  In: -   Out: 1070 [Urine:1070]    Physical Exam  Vitals reviewed.   Constitutional:       General: He is not in acute distress.     Appearance: Normal appearance. He is not ill-appearing or toxic-appearing.   HENT:      Head: Normocephalic and atraumatic.      Right Ear: External ear normal.      Left Ear: External ear normal.      Nose: Nose normal.   Eyes:      General: No scleral icterus.     Conjunctiva/sclera: Conjunctivae normal.   Cardiovascular:      Rate and Rhythm: Normal rate and regular rhythm.      Pulses: Normal pulses.      Heart sounds: Normal heart sounds. No murmur heard.     No friction rub.   Pulmonary:      Effort: Pulmonary effort is normal. No respiratory distress.      Breath sounds: Normal breath sounds.  No wheezing or rhonchi.   Abdominal:      General: Bowel sounds are normal. There is no distension.      Palpations: Abdomen is soft.      Tenderness: There is no abdominal tenderness. There is no guarding.   Musculoskeletal:         General: No swelling or deformity. Normal range of motion.      Cervical back: Normal range of motion and neck supple. No tenderness.      Right lower leg: No edema.      Left lower leg: No edema.   Skin:     General: Skin is warm and dry.      Coloration: Skin is not jaundiced.      Findings: No erythema or rash.   Neurological:      General: No focal deficit present.      Mental Status: He is alert and oriented to person, place, and time.      Motor: No weakness.   Psychiatric:         Mood and Affect: Mood normal.         Behavior: Behavior normal.         Assessment/Plan:   67 yr old AAM with hx of kidney transplant in 2005 (bcrof 2.8-3.2), COPD, CAD s/p CABG in 2006 with active smoking, AFIB on amiodarone/Eliquis, HTN who is admitted for shortness of breath as well as dyspnea on exertion    1) Community acquired pneumonia  Treated by primary team    2) KATINA  on CKD G4A2 of kidney transplant  Most likely multifactorial, both cardiorenal. Natural progression of CKD, and anemia that is symptomatic  - baseline creatinine 2.8 to 3.2, today 4.7 with eGFR 12.8    3) Immunosuppression  - stopped taking Cellcept roughly a year ago per Dr. Diaz/Dr. Webb  - Tacrolimus goal between 4 and 6, continue 3 mg twice daily  - monitor for drug toxicity and interactions with other therapies daily    4) Hypertension  Not at goal of near 140/90 while in the hospital, continue current plan  - active CP that appears to be Angina, recommend cardiology manage for medical optimization, not sure he is a candidate for angiogram    5) Acid Base and Electrolytes  - NAGMA, mild consider sodium bicarbonate 1300 mg twice daily, goal greater than 24    6) Anemia of CKD  - recommend iron studies, last evaluated on  2/23, recommend IV iron if deficient  - Hgb today 6.9 transfusing 1 UPRBC this am, consider EPO  - transfuse Hgb less than 7.0, consider higher threshold in the setting of CAD    7) CKD-MBD  Hyperphosphatemia, recommend dietary management at this time, consider phosphate binder if greater than 5.5  - last iPTH 9/23 was 32.9  - Vitamin D 1,25 10, D ,25 Hydroxy level 72 on 1/23    Consider palliative care consult for symptom management      Will continue to follow. Please call if you have any questions.     Jose Warren DO  [facility]

## 2023-10-16 NOTE — PLAN OF CARE
Problem: Adult Inpatient Plan of Care  Goal: Plan of Care Review  Outcome: Ongoing, Progressing  Goal: Patient-Specific Goal (Individualized)  Outcome: Ongoing, Progressing  Goal: Absence of Hospital-Acquired Illness or Injury  Outcome: Ongoing, Progressing  Goal: Optimal Comfort and Wellbeing  Outcome: Ongoing, Progressing  Goal: Readiness for Transition of Care  Outcome: Ongoing, Progressing     Problem: Fluid and Electrolyte Imbalance (Acute Kidney Injury/Impairment)  Goal: Fluid and Electrolyte Balance  Outcome: Ongoing, Progressing     Problem: Oral Intake Inadequate (Acute Kidney Injury/Impairment)  Goal: Optimal Nutrition Intake  Outcome: Ongoing, Progressing     Problem: Renal Function Impairment (Acute Kidney Injury/Impairment)  Goal: Effective Renal Function  Outcome: Ongoing, Progressing     Problem: Pain Acute  Goal: Acceptable Pain Control and Functional Ability  Outcome: Ongoing, Progressing     Pt BP went up to 218/95 mmhg with a pulse of 55 by  0200. The medical team was consulted and he was given 5mg hydralazine on two occasions. His current BP is 145/65 mmgh. AOX4. VSS.

## 2023-10-16 NOTE — PROGRESS NOTES
[facility] - Telemetry Stepdown  Cardiology  Progress Note    Patient Name: Ibrahima Phelps  MRN: [id number]  Admission Date: 10/13/2023  Hospital Length of Stay: 3 days  Code Status: Full Code   Attending Physician: Ricky Judge MD   Primary Care Physician: Anatoliy Colindres MD  Expected Discharge Date:   Principal Problem:<principal problem not specified>    Subjective:     Hospital Course:   No notes on file    Interval History: At 1115 patient began to develop substernal chest pain. Nitro was given with relief of symptoms. Primary team was called to assess the patient. EKG during that time during showed ST segment changes. Troponin was ordered. ACS protocol was initiated.     Review of Systems   Constitutional: Positive for diaphoresis. Negative for chills and fever.   HENT:  Negative for congestion.    Cardiovascular:  Positive for chest pain. Negative for dyspnea on exertion.   Respiratory:  Negative for cough and shortness of breath.    Musculoskeletal:  Negative for back pain.   Gastrointestinal:  Negative for abdominal pain, diarrhea, nausea and vomiting.   Neurological:  Negative for headaches.     Objective:     Vital Signs (Most Recent):  Temp: 98 °F (36.7 °C) (10/16/23 1100)  Pulse: 68 (10/16/23 1214)  Resp: 14 (10/16/23 1214)  BP: (!) 161/67 (10/16/23 1100)  SpO2: 96 % (10/16/23 1214) Vital Signs (24h Range):  Temp:  [97.8 °F (36.6 °C)-98.7 °F (37.1 °C)] 98 °F (36.7 °C)  Pulse:  [53-78] 68  Resp:  [14-20] 14  SpO2:  [96 %-100 %] 96 %  BP: (145-202)/(63-88) 161/67     Weight: 93.9 kg (207 lb 0.2 oz)  Body mass index is 27.31 kg/m².     SpO2: 96 %         Intake/Output Summary (Last 24 hours) at 10/16/2023 1326  Last data filed at 10/16/2023 0140  Gross per 24 hour   Intake --   Output 420 ml   Net -420 ml       Lines/Drains/Airways       Peripheral Intravenous Line  Duration                  Peripheral IV - Single Lumen 10/13/23 1500 20 G;1 3/4 in Anterior;Right Upper Arm 2 days                        Physical Exam  Constitutional:       General: He is not in acute distress.     Appearance: He is not ill-appearing.   HENT:      Nose: Nose normal.      Mouth/Throat:      Mouth: Mucous membranes are moist.   Eyes:      Pupils: Pupils are equal, round, and reactive to light.   Neck:      Comments: No JVD appreciated   Cardiovascular:      Rate and Rhythm: Normal rate and regular rhythm.      Pulses: Normal pulses.      Heart sounds: No murmur heard.  Pulmonary:      Effort: Pulmonary effort is normal. No respiratory distress.      Breath sounds: No wheezing or rales.   Abdominal:      General: Abdomen is flat. There is no distension.      Palpations: Abdomen is soft.      Tenderness: There is no right CVA tenderness or left CVA tenderness.   Musculoskeletal:         General: No swelling.      Cervical back: Normal range of motion and neck supple. No tenderness.      Right lower leg: Edema (1+ pitting) present.      Left lower leg: Edema (1+ pitting) present.   Skin:     General: Skin is warm.      Coloration: Skin is not pale.      Findings: No rash.   Neurological:      General: No focal deficit present.      Mental Status: He is alert and oriented to person, place, and time.      Motor: No weakness.            Significant Labs: CMP   Recent Labs   Lab 10/15/23  0439 10/16/23  0425 10/16/23  1112    141 141   K 3.8 3.8 3.4*    109 106   CO2 25 21* 22*   GLU 84 82 96   BUN 32* 36* 34*   CREATININE 4.5* 4.4* 4.7*   CALCIUM 7.7* 7.4* 7.5*   PROT 5.6* 5.4*  --    ALBUMIN 2.5* 2.4*  --    BILITOT 0.3 0.4  --    ALKPHOS 55 52*  --    AST 17 18  --    ALT 16 16  --    ANIONGAP 9 11 13    and CBC   Recent Labs   Lab 10/14/23  1926 10/15/23  0439 10/16/23  0425   WBC 7.39 8.34 7.46   HGB 7.2* 7.0* 6.9*   HCT 22.7* 22.3* 21.8*    141* 152       Significant Imaging:   Results for orders placed or performed during the hospital encounter of 10/13/23   Echo   Result Value Ref Range    Ascending aorta 2.91 cm     STJ 2.78 cm    AV mean gradient 9 mmHg    Ao peak aris 2.12 m/s    Ao VTI 49.26 cm    IVRT 114.18 msec    IVS 1.02 0.6 - 1.1 cm    LA size 4.49 cm    Left Atrium Major Axis 6.82 cm    Left Atrium Minor Axis 6.21 cm    LVIDd 5.74 3.5 - 6.0 cm    LVIDs 4.31 (A) 2.1 - 4.0 cm    LVOT diameter 2.2 cm    LVOT peak VTI 24.00 cm    Posterior Wall 0.84 0.6 - 1.1 cm    MV Peak A Aris 0.74 m/s    E wave deceleration time 217.91 msec    MV Peak E Aris 0.96 m/s    RA Major Axis 5.75 cm    RA Width 4.01 cm    RVDD 3.18 cm    Sinus 3.37 cm    TAPSE 2.09 cm    TR Max Aris 3.74 m/s    LA WIDTH 5.14 cm    LV Diastolic Volume 162.44 mL    LV Systolic Volume 83.57 mL    LVOT peak aris 1.15 m/s    TDI LATERAL 0.09 m/s    TDI SEPTAL 0.07 m/s    LA volume (mod) 117.00 cm3    LV LATERAL E/E' RATIO 10.67 m/s    LV SEPTAL E/E' RATIO 13.71 m/s    FS 25 %    LA volume 127.52 cm3    LV mass 208.48 g    ZLVIDD -2.39     ZLVIDS -0.22     Left Ventricle Relative Wall Thickness 0.29 cm    AV valve area 1.85 cm²    AV Velocity Ratio 0.54     AV index (prosthetic) 0.49     E/A ratio 1.30     Mean e' 0.08 m/s    LVOT area 3.8 cm2    LVOT stroke volume 91.19 cm3    AV peak gradient 18 mmHg    E/E' ratio 12.00 m/s    LV Systolic Volume Index 38.3 mL/m2    LV Diastolic Volume Index 74.51 mL/m2    LA Volume Index 58.5 mL/m2    LV Mass Index 96 g/m2    Triscuspid Valve Regurgitation Peak Gradient 56 mmHg    LA Volume Index (Mod) 53.7 mL/m2    VERONICA by Velocity Ratio 2.06 cm²    BSA 2.2 m2    TV resting pulmonary artery pressure 59 mmHg    RV TB RVSP 7 mmHg    Est. RA pres 3 mmHg    Narrative      Left Ventricle: The left ventricle is normal in size. Ventricular mass   is normal. Normal wall thickness. Normal wall motion. There is normal   systolic function with a visually estimated ejection fraction of 60 - 65%.   Grade II diastolic dysfunction.    Left Atrium: Left atrium is severely dilated.    Right Ventricle: Normal right ventricular cavity size. Wall  thickness   is normal. Right ventricle wall motion  is normal. Systolic function is   normal.    Right Atrium: Right atrium is dilated.    Aortic Valve: Moderately calcified cusps. There is moderate annular   calcification present. Mildly restricted motion. There is mild stenosis.   Aortic valve area by VTI is 1.85 cm². Aortic valve peak velocity is 2.12   m/s. Mean gradient is 9 mmHg. The dimensionless index is 0.49. There is   mild to moderate aortic regurgitation.    Pulmonary Artery: The estimated pulmonary artery systolic pressure is   59 mmHg.    IVC/SVC: Normal venous pressure at 3 mmHg.       Assessment and Plan:     Unstable angina pectoris  67 yr old AAM with paroxysmal Afib s/p cardioversion 3/2022 (on Eliquis, Metoprolol, and Amiodarone),  HFpEF (EF 65%), kidney transplant in 4/12/2005 (bcr of 2.8-3.2), COPD, CAD s/p PCI in 2006, HTN.    Patient began to have chest pain at 1115. Patient was given nitro with relief of symptoms.  Primary team was at beside, EKG with no ST segment changes. Discussed about performing LHC with the patient for possible stenting. Patient refused due to possible nephrotoxic effects 2/2 contrasts dye as patient has CKD and has kidney transplants. Patient stated that he does not to be back on dialysis. He is aware about the risk and benefits of not performing the LHC. However, he is agreeable to medical therapy.     Plan:  - ACS protocol was initiated.   - Per pharmacy, will hold heparin gtt until 9:00 PM this evening as patient had a dose of eliquis this morning. If patient has continuing chest pain may start heparin gtt sooner. If patient is complaining of chest pain, please contact cardiology  - Aspirin 325 mg/Clopidogrel 600 mg  - Beta-Blocker: metoprolol 25 mg  - Statin: atorvastatin 10 mg  - troponin  - EKG  - possible goals of care discussion       Acute on chronic heart failure with preserved ejection fraction  Echo    Interpretation Summary    Left Ventricle: The  left ventricle is normal in size. Ventricular mass is normal. Normal wall thickness. Normal wall motion. There is normal systolic function with a visually estimated ejection fraction of 60 - 65%. Grade II diastolic dysfunction.    Left Atrium: Left atrium is severely dilated.    Right Ventricle: Normal right ventricular cavity size. Wall thickness is normal. Right ventricle wall motion  is normal. Systolic function is normal.    Right Atrium: Right atrium is dilated.    Aortic Valve: Moderately calcified cusps. There is moderate annular calcification present. Mildly restricted motion. There is mild stenosis. Aortic valve area by VTI is 1.85 cm². Aortic valve peak velocity is 2.12 m/s. Mean gradient is 9 mmHg. The dimensionless index is 0.49. There is mild to moderate aortic regurgitation.    Pulmonary Artery: The estimated pulmonary artery systolic pressure is 59 mmHg.    IVC/SVC: Normal venous pressure at 3 mmHg.    Patient is euvolemic, no JVD, lung exam clear to auscultation, 1+ pitting edema bilateral LE, Echo revealing normal venous pressure.     Plan:   - Ok to hold lasix 2/2 to elevated creatinine and per KTM.   - Continue hemodynamic management.  - continue BilDil   - Continue metoprolol 25 mg, nifedipine 60 mg      VTE Risk Mitigation (From admission, onward)         Ordered     heparin 25,000 units in dextrose 5% (100 units/ml) IV bolus from bag INITIAL BOLUS (max bolus 4000 units)  Once        Question:  Heparin Infusion Adjustment (DO NOT MODIFY ANSWER)  Answer:  [email or web address]\epic\Images\Pharmacy\HeparinInfusions\heparin LOW INTENSITY nomogram for [facility] [id number]    10/16/23 1133     heparin 25,000 units in dextrose 5% 250 mL (100 units/mL) infusion LOW INTENSITY nomogram - [facility]  Continuous        Question:  Begin at (units/kg/hr)  Answer:  12    10/16/23 1133     heparin 25,000 units in dextrose 5% (100 units/ml) IV bolus from bag - ADDITIONAL PRN BOLUS - 60 units/kg (max bolus 4000 units)  As  needed (PRN)        Question:  Heparin Infusion Adjustment (DO NOT MODIFY ANSWER)  Answer:  [email or web address]\epic\Images\Pharmacy\HeparinInfusions\heparin LOW INTENSITY nomogram for [facility] [id number]    10/16/23 1133     heparin 25,000 units in dextrose 5% (100 units/ml) IV bolus from bag - ADDITIONAL PRN BOLUS - 30 units/kg (max bolus 4000 units)  As needed (PRN)        Question:  Heparin Infusion Adjustment (DO NOT MODIFY ANSWER)  Answer:  [email or web address]\epic\Images\Pharmacy\HeparinInfusions\heparin LOW INTENSITY nomogram for [facility] [id number]    10/16/23 1133                Angelo Magdaleno DO  Cardiology  [facility] - Telemetry Stepdown

## 2023-10-16 NOTE — PROGRESS NOTES
"[facility] - Telemetry [facility] Medicine  Progress Note    Patient Name: Ibrahima Phelps  MRN: [id number]  Patient Class: IP- Inpatient   Admission Date: 10/13/2023  Length of Stay: 3 days  Attending Physician: Ricky Judge MD  Primary Care Provider: Anatoliy Colindres MD        Subjective:     Principal Problem:<principal problem not specified>        HPI:  66 yo M active smoker w/ COPD, CAD s/p CABG x1 in 2006, paroxysmal Afib s/p cardioversion 3/2022 (on Eliquis, Metoprolol, and Amiodarone), HFpEF (EF 65%; not currently taking diuretics), hiatal hernia with reflux, HTN on Hydralazine, CKD4 s/p kidney transplants in 1992 and 2005 on Tacrolimus and Prednisone, presented to the ED with his wife complaining about worsening shortness of breath/DAUGHERTY; infrequent, nonproductive cough; and severe, sharp L posterior heel pain that started a few days ago. Per the patient, he woke up last night "gasping for air," short of breath, and with substernal chest pain radiating to his back. The chest pain resolved with Omeprazole; denies chest pain at this time. The shortness of breath and infrequent, nonproductive cough typically occur at rest and are worse with walking. The severe, sharp L posterior heel pain is worse with movement and walking, and has not responded to leg elevation. Of note, he has a history of tenosynovitis. He noted significant, watery diarrhea associated with stomach pain all day yesterday that resolved with Peptobismol and water; denies diarrhea this morning. Denies fevers, chills, nausea, vomiting, constipation, dysuria, hematuria, palpitations, headache, or vision changes.     His presenting vital signs were a temperature of 98.3, pulse rate of 57-84 bpm, resp rate of 20-28, blood pressure ranging from 156-169/69-74, MAP of 104-107, and SpO2 % on RA. Troponin (0.081), Magnesium (nml), CBC (Hgb 8.2, platelets 127), CMP (CO2 19, BUN 33, Cr 4.1), and BNP (1,168) were ordered. CXR showed RLL " consolidation. XR L ankle showed calcaneal enthesopathy. CTAP showed trace bilateral pleural effusions, bibasilar dependent pulmonary opacities, new prominence/thickening of the L iliac fossa transplant kidney's ureter. Started on CTX, AZT, and Lasix      Overview/Hospital Course:  Patient admitted for further management. Patient continued having chest pain, cardiology consulted. Patient found to have elevated PA pressures concerning for pHTN which was likely contributing to shortness of breath. KTM consulted and recommended stopping lasix. Pt continuing to have chest pain in hospital, Cardiology following, initiated ACS protocol with plans to do a PET stress to assess for Ischemia, though patient not presently a candidate for LHC given CKD5. Pt not amenable to dialysis. Pt transfused 1 unit of pRBCs during admission for Hgb <7. Palliative consulted for GOC      Interval History: New episode of chest pain, cardio starting ACS protocol; plans for PET. Pt transfused 1u pRBCs. Palliative consulted    Review of Systems   Constitutional:  Negative for chills, fatigue and fever.   HENT:  Negative for ear pain and sinus pain.    Eyes:  Negative for photophobia, pain and visual disturbance.   Respiratory:  Negative for chest tightness, shortness of breath and wheezing.    Cardiovascular:  Positive for chest pain. Negative for leg swelling.   Gastrointestinal:  Negative for abdominal pain, blood in stool, constipation, diarrhea, nausea and vomiting.   Endocrine: Negative for polydipsia and polyuria.   Genitourinary:  Negative for difficulty urinating, dysuria, flank pain, frequency, penile pain and testicular pain.   Musculoskeletal:  Negative for back pain, neck pain and neck stiffness.   Skin:  Negative for color change and rash.   Neurological:  Negative for dizziness, syncope, weakness, numbness and headaches.   Psychiatric/Behavioral:  Negative for confusion and sleep disturbance.      Objective:     Vital Signs (Most  Recent):  Temp: 98 °F (36.7 °C) (10/16/23 1100)  Pulse: 68 (10/16/23 1214)  Resp: 14 (10/16/23 1214)  BP: (!) 161/67 (10/16/23 1100)  SpO2: 96 % (10/16/23 1214) Vital Signs (24h Range):  Temp:  [97.8 °F (36.6 °C)-98.7 °F (37.1 °C)] 98 °F (36.7 °C)  Pulse:  [53-78] 68  Resp:  [14-20] 14  SpO2:  [96 %-100 %] 96 %  BP: (145-202)/(63-88) 161/67     Weight: 93.9 kg (207 lb 0.2 oz)  Body mass index is 27.31 kg/m².    Intake/Output Summary (Last 24 hours) at 10/16/2023 1402  Last data filed at 10/16/2023 0140  Gross per 24 hour   Intake --   Output 420 ml   Net -420 ml           Physical Exam  Constitutional:       General: He is not in acute distress.     Appearance: He is not ill-appearing.   HENT:      Head: Normocephalic and atraumatic.      Right Ear: External ear normal.      Left Ear: External ear normal.      Nose: Nose normal. No congestion or rhinorrhea.      Mouth/Throat:      Mouth: Mucous membranes are dry.      Pharynx: Oropharynx is clear. No oropharyngeal exudate or posterior oropharyngeal erythema.   Eyes:      General: No scleral icterus.        Right eye: No discharge.         Left eye: No discharge.      Extraocular Movements: Extraocular movements intact.      Conjunctiva/sclera: Conjunctivae normal.   Cardiovascular:      Rate and Rhythm: Normal rate and regular rhythm.      Pulses: Normal pulses.      Heart sounds: Normal heart sounds. No murmur heard.     No gallop.   Pulmonary:      Effort: No respiratory distress.      Breath sounds: Normal breath sounds. No stridor. No wheezing or rales.   Chest:      Chest wall: No tenderness.   Abdominal:      General: Abdomen is flat. Bowel sounds are normal. There is no distension.      Palpations: Abdomen is soft. There is no mass.      Tenderness: There is no abdominal tenderness. There is no guarding or rebound.      Hernia: No hernia is present.   Musculoskeletal:         General: No swelling, tenderness or deformity. Normal range of motion.      Cervical  back: Normal range of motion. No rigidity or tenderness.      Right lower leg: Edema present.      Left lower leg: Edema present.   Skin:     General: Skin is warm and dry.      Capillary Refill: Capillary refill takes less than 2 seconds.      Coloration: Skin is not jaundiced.      Findings: No bruising, erythema or rash.   Neurological:      General: No focal deficit present.      Mental Status: He is alert and oriented to person, place, and time.      Cranial Nerves: No cranial nerve deficit.      Sensory: No sensory deficit.      Motor: No weakness.   Psychiatric:         Mood and Affect: Mood normal.         Behavior: Behavior normal.         Thought Content: Thought content normal.         Judgment: Judgment normal.             Significant Labs: All pertinent labs within the past 24 hours have been reviewed.    Significant Imaging: I have reviewed all pertinent imaging results/findings within the past 24 hours.      Assessment/Plan:      Unstable angina pectoris  Having chest pain during admission, notable episode on 10/16; troponin obtained: elevated from days prior, will trend. EKG significant for ST changes. Cardiology following    - ACS protocol initiated.   - Holding heparin gtt until later this evening to allow for apixaban washout, Start drip sooner if pt having CP   - If patient is complaining of chest pain, inform cardiology  - ASA and Plavix loading dose, followed by maintenance therapy  - Start metoprolol 25 mg  - Continue atorvastatin 10 mg      Consolidation lung  Patchy consolidation observed on CXR in ED. CT was largely unremarkable. Patient has worsening renal function.     - d/c antibiotics      CKD (chronic kidney disease), stage IV  Cr. Today was 4.1, baseline around 3.7-4. Managed by nephrology outpatient    - avoid nephrotoxic drugs and renally dose medications  - strict I/Os      Anemia in stage 4 chronic kidney disease  Hgb 8.2 on admit, around baseline    Lab Results   Component Value  "Date    IRON 54 02/10/2023    TIBC 142 (L) 02/10/2023    FERRITIN 1,911 (H) 02/20/2023     Lab Results   Component Value Date    FOLATE 8.8 02/28/2023     Lab Results   Component Value Date    MVLLOTWB72 278 06/20/2023     No results found for: "RETICCTPCT"    Likely secondary to CKD    Plan:   -   Lab Results   Component Value Date    HGB 6.9 (L) 10/16/2023     - Transfusion of 1u pRBCs on 10/16    Acute on chronic heart failure with preserved ejection fraction  Patient admitted with shortness of breath, edema, PND, orthopnea, consistent with acute on chronic diastolic CHF exacerbation.  Last TTE in February of 2023; EF 65% with Grade II Diastolic Dysfunction    BNP: BNP  Recent Labs   Lab 10/13/23  0836   BNP 1,168*     CXR: No significant pleural fluid, evidence of patchy consolidation    Troponin:   Recent Labs   Lab 10/16/23  1112   TROPONINI 0.084*       Home Meds:  Prescribed Lasix 40mg though not taking, states he took 20mg every other day over past week 2/2 swelling    Plan:  - hold lasix per KTM (in setting of ATN)  - Continue BilDil  - Metoprolol and Nifedipine  - Strict ins and outs  - TTE pending   - Continue metoprolol        Immunodeficiency due to treatment with immunosuppressive medication  See H/o Kidney transplant    Palliative care encounter  Palliative consulted on 10/16, see note      Chronic obstructive pulmonary disease  Pt complaining of cough and shortness of breath, duration 3 days  Saturating appropriately on room air, no wheezing or abnormal breath sounds on auscultation     - holding home duonebs      Paroxysmal atrial fibrillation  Patient with Long standing persistent (>12 months) atrial fibrillation which is controlled currently with Beta Blocker and Amiodarone. Patient is currently in sinus rhythm.[id number] Score: 2. . Anticoagulation indicated. Anticoagulation done with apixaban.  S/p cardioversion in March of 2022    - Continue amio, metoprolol, and apixaban  - monitor on " telemetry    H/O kidney transplant  Kidney transplant in 1996 and 2003  Stopped taking Cellcept roughly a year ago per Dr. Diaz/Dr. Webb  Cr elevated to 4.1 on admission, around patients baseline     - Tacro level low; Tacrolimus goal between 4 and 6, continue 3 mg twice itzel  - Continue prednisone  - KTM consulted   - monitor for drug toxicity and interactions with other therapies daily    Long term (current) use of anticoagulants [V58.61]  Hold apixaban with plans to start heparin infusion      Mixed hyperlipidemia  Chronic and stable    - continue statin      CAD (coronary artery disease)  CABG in 2005    - Continue ASA and Statin  - Slight troponin elevation on admission, will trend      VTE Risk Mitigation (From admission, onward)         Ordered     heparin 25,000 units in dextrose 5% (100 units/ml) IV bolus from bag INITIAL BOLUS (max bolus 4000 units)  Once        Question:  Heparin Infusion Adjustment (DO NOT MODIFY ANSWER)  Answer:  \\[email or web address]\epic\Images\Pharmacy\HeparinInfusions\heparin LOW INTENSITY nomogram for [facility] [id number]    10/16/23 1133     heparin 25,000 units in dextrose 5% 250 mL (100 units/mL) infusion LOW INTENSITY nomogram - [facility]  Continuous        Question:  Begin at (units/kg/hr)  Answer:  12    10/16/23 1133     heparin 25,000 units in dextrose 5% (100 units/ml) IV bolus from bag - ADDITIONAL PRN BOLUS - 60 units/kg (max bolus 4000 units)  As needed (PRN)        Question:  Heparin Infusion Adjustment (DO NOT MODIFY ANSWER)  Answer:  \[email or web address]\epic\Images\Pharmacy\HeparinInfusions\heparin LOW INTENSITY nomogram for [facility] [id number]    10/16/23 1133     heparin 25,000 units in dextrose 5% (100 units/ml) IV bolus from bag - ADDITIONAL PRN BOLUS - 30 units/kg (max bolus 4000 units)  As needed (PRN)        Question:  Heparin Infusion Adjustment (DO NOT MODIFY ANSWER)  Answer:  \\[email or web address]\epic\Images\Pharmacy\HeparinInfusions\heparin LOW INTENSITY nomogram for [facility] [id number]     10/16/23 1133                Discharge Planning   RAMU:      Code Status: Full Code   Is the patient medically ready for discharge?:     Reason for patient still in hospital (select all that apply): Patient trending condition  Discharge Plan A: Home with family          Evan Porras MD  Department of Hospital Medicine   [facility]

## 2023-10-16 NOTE — SUBJECTIVE & OBJECTIVE
Interval History: At 1115 patient began to develop substernal chest pain. Nitro was given with relief of symptoms. Primary team was called to assess the patient. EKG during that time during showed ST segment changes. Troponin was ordered. ACS protocol was initiated.     Review of Systems   Constitutional: Positive for diaphoresis. Negative for chills and fever.   HENT:  Negative for congestion.    Cardiovascular:  Positive for chest pain. Negative for dyspnea on exertion.   Respiratory:  Negative for cough and shortness of breath.    Musculoskeletal:  Negative for back pain.   Gastrointestinal:  Negative for abdominal pain, diarrhea, nausea and vomiting.   Neurological:  Negative for headaches.     Objective:     Vital Signs (Most Recent):  Temp: 98 °F (36.7 °C) (10/16/23 1100)  Pulse: 68 (10/16/23 1214)  Resp: 14 (10/16/23 1214)  BP: (!) 161/67 (10/16/23 1100)  SpO2: 96 % (10/16/23 1214) Vital Signs (24h Range):  Temp:  [97.8 °F (36.6 °C)-98.7 °F (37.1 °C)] 98 °F (36.7 °C)  Pulse:  [53-78] 68  Resp:  [14-20] 14  SpO2:  [96 %-100 %] 96 %  BP: (145-202)/(63-88) 161/67     Weight: 93.9 kg (207 lb 0.2 oz)  Body mass index is 27.31 kg/m².     SpO2: 96 %         Intake/Output Summary (Last 24 hours) at 10/16/2023 1326  Last data filed at 10/16/2023 0140  Gross per 24 hour   Intake --   Output 420 ml   Net -420 ml       Lines/Drains/Airways       Peripheral Intravenous Line  Duration                  Peripheral IV - Single Lumen 10/13/23 1500 20 G;1 3/4 in Anterior;Right Upper Arm 2 days                       Physical Exam  Constitutional:       General: He is not in acute distress.     Appearance: He is not ill-appearing.   HENT:      Nose: Nose normal.      Mouth/Throat:      Mouth: Mucous membranes are moist.   Eyes:      Pupils: Pupils are equal, round, and reactive to light.   Neck:      Comments: No JVD appreciated   Cardiovascular:      Rate and Rhythm: Normal rate and regular rhythm.      Pulses: Normal pulses.       Heart sounds: No murmur heard.  Pulmonary:      Effort: Pulmonary effort is normal. No respiratory distress.      Breath sounds: No wheezing or rales.   Abdominal:      General: Abdomen is flat. There is no distension.      Palpations: Abdomen is soft.      Tenderness: There is no right CVA tenderness or left CVA tenderness.   Musculoskeletal:         General: No swelling.      Cervical back: Normal range of motion and neck supple. No tenderness.      Right lower leg: Edema (1+ pitting) present.      Left lower leg: Edema (1+ pitting) present.   Skin:     General: Skin is warm.      Coloration: Skin is not pale.      Findings: No rash.   Neurological:      General: No focal deficit present.      Mental Status: He is alert and oriented to person, place, and time.      Motor: No weakness.            Significant Labs: CMP   Recent Labs   Lab 10/15/23  0439 10/16/23  0425 10/16/23  1112    141 141   K 3.8 3.8 3.4*    109 106   CO2 25 21* 22*   GLU 84 82 96   BUN 32* 36* 34*   CREATININE 4.5* 4.4* 4.7*   CALCIUM 7.7* 7.4* 7.5*   PROT 5.6* 5.4*  --    ALBUMIN 2.5* 2.4*  --    BILITOT 0.3 0.4  --    ALKPHOS 55 52*  --    AST 17 18  --    ALT 16 16  --    ANIONGAP 9 11 13    and CBC   Recent Labs   Lab 10/14/23  1926 10/15/23  0439 10/16/23  0425   WBC 7.39 8.34 7.46   HGB 7.2* 7.0* 6.9*   HCT 22.7* 22.3* 21.8*    141* 152       Significant Imaging:   Results for orders placed or performed during the hospital encounter of 10/13/23   Echo   Result Value Ref Range    Ascending aorta 2.91 cm    STJ 2.78 cm    AV mean gradient 9 mmHg    Ao peak aris 2.12 m/s    Ao VTI 49.26 cm    IVRT 114.18 msec    IVS 1.02 0.6 - 1.1 cm    LA size 4.49 cm    Left Atrium Major Axis 6.82 cm    Left Atrium Minor Axis 6.21 cm    LVIDd 5.74 3.5 - 6.0 cm    LVIDs 4.31 (A) 2.1 - 4.0 cm    LVOT diameter 2.2 cm    LVOT peak VTI 24.00 cm    Posterior Wall 0.84 0.6 - 1.1 cm    MV Peak A Aris 0.74 m/s    E wave deceleration time 217.91  msec    MV Peak E Aris 0.96 m/s    RA Major Axis 5.75 cm    RA Width 4.01 cm    RVDD 3.18 cm    Sinus 3.37 cm    TAPSE 2.09 cm    TR Max Aris 3.74 m/s    LA WIDTH 5.14 cm    LV Diastolic Volume 162.44 mL    LV Systolic Volume 83.57 mL    LVOT peak aris 1.15 m/s    TDI LATERAL 0.09 m/s    TDI SEPTAL 0.07 m/s    LA volume (mod) 117.00 cm3    LV LATERAL E/E' RATIO 10.67 m/s    LV SEPTAL E/E' RATIO 13.71 m/s    FS 25 %    LA volume 127.52 cm3    LV mass 208.48 g    ZLVIDD -2.39     ZLVIDS -0.22     Left Ventricle Relative Wall Thickness 0.29 cm    AV valve area 1.85 cm²    AV Velocity Ratio 0.54     AV index (prosthetic) 0.49     E/A ratio 1.30     Mean e' 0.08 m/s    LVOT area 3.8 cm2    LVOT stroke volume 91.19 cm3    AV peak gradient 18 mmHg    E/E' ratio 12.00 m/s    LV Systolic Volume Index 38.3 mL/m2    LV Diastolic Volume Index 74.51 mL/m2    LA Volume Index 58.5 mL/m2    LV Mass Index 96 g/m2    Triscuspid Valve Regurgitation Peak Gradient 56 mmHg    LA Volume Index (Mod) 53.7 mL/m2    VERONICA by Velocity Ratio 2.06 cm²    BSA 2.2 m2    TV resting pulmonary artery pressure 59 mmHg    RV TB RVSP 7 mmHg    Est. RA pres 3 mmHg    Narrative      Left Ventricle: The left ventricle is normal in size. Ventricular mass   is normal. Normal wall thickness. Normal wall motion. There is normal   systolic function with a visually estimated ejection fraction of 60 - 65%.   Grade II diastolic dysfunction.    Left Atrium: Left atrium is severely dilated.    Right Ventricle: Normal right ventricular cavity size. Wall thickness   is normal. Right ventricle wall motion  is normal. Systolic function is   normal.    Right Atrium: Right atrium is dilated.    Aortic Valve: Moderately calcified cusps. There is moderate annular   calcification present. Mildly restricted motion. There is mild stenosis.   Aortic valve area by VTI is 1.85 cm². Aortic valve peak velocity is 2.12   m/s. Mean gradient is 9 mmHg. The dimensionless index is 0.49.  There is   mild to moderate aortic regurgitation.    Pulmonary Artery: The estimated pulmonary artery systolic pressure is   59 mmHg.    IVC/SVC: Normal venous pressure at 3 mmHg.

## 2023-10-16 NOTE — ASSESSMENT & PLAN NOTE
Patient admitted with shortness of breath, edema, PND, orthopnea, consistent with acute on chronic diastolic CHF exacerbation.  Last TTE in February of 2023; EF 65% with Grade II Diastolic Dysfunction    BNP: BNP  Recent Labs   Lab 10/13/23  0836   BNP 1,168*     CXR: No significant pleural fluid, evidence of patchy consolidation    Troponin:   Recent Labs   Lab 10/16/23  1112   TROPONINI 0.084*       Home Meds:  Prescribed Lasix 40mg though not taking, states he took 20mg every other day over past week 2/2 swelling    Plan:  - hold lasix per KTM (in setting of ATN)  - Continue BilDil  - Metoprolol and Nifedipine  - Strict ins and outs  - TTE pending   - Continue metoprolol

## 2023-10-16 NOTE — PLAN OF CARE
Problem: Adult Inpatient Plan of Care  Goal: Plan of Care Review  Outcome: Ongoing, Progressing  Goal: Patient-Specific Goal (Individualized)  Outcome: Ongoing, Progressing  Goal: Absence of Hospital-Acquired Illness or Injury  Outcome: Ongoing, Progressing  Goal: Optimal Comfort and Wellbeing  Outcome: Ongoing, Progressing  Goal: Readiness for Transition of Care  Outcome: Ongoing, Progressing     Problem: Fluid and Electrolyte Imbalance (Acute Kidney Injury/Impairment)  Goal: Fluid and Electrolyte Balance  Outcome: Ongoing, Progressing     Problem: Oral Intake Inadequate (Acute Kidney Injury/Impairment)  Goal: Optimal Nutrition Intake  Outcome: Ongoing, Progressing     Problem: Renal Function Impairment (Acute Kidney Injury/Impairment)  Goal: Effective Renal Function  Outcome: Ongoing, Progressing     Problem: Pain Acute  Goal: Acceptable Pain Control and Functional Ability  Outcome: Ongoing, Progressing     Problem: Coping Ineffective  Goal: Effective Coping  Outcome: Ongoing, Progressing   Patient aox4, on ra and vss other than controlled htn. Patient had complaints of chest pain. RN notified and upon entering the room the patient was sob, placed on o2, vitals taken, nitro administered and MD called to bedside. Troponin, bmp, lactic acid and ekg ordered. Patient relieved of chest pain after nitro administration. Patient refused asa. Patient received x1 unit of prbc with no complaints. Patient free of injury with all safety measures in place. Will continue to follow POC per md orders.

## 2023-10-16 NOTE — ASSESSMENT & PLAN NOTE
Pt complaining of cough and shortness of breath, duration 3 days  Saturating appropriately on room air, no wheezing or abnormal breath sounds on auscultation     - holding home duonebs

## 2023-10-16 NOTE — ASSESSMENT & PLAN NOTE
Kidney transplant in 1996 and 2003  Stopped taking Cellcept roughly a year ago per Dr. Diaz/Dr. Webb  Cr elevated to 4.1 on admission, around patients baseline     - Tacro level low; Tacrolimus goal between 4 and 6, continue 3 mg twice itzel  - Continue prednisone  - KTM consulted   - monitor for drug toxicity and interactions with other therapies daily

## 2023-10-16 NOTE — PROGRESS NOTES
[facility] ([facility]) Team notified of pt referral via Direct notification via epic in basket message, secure chat message or other .    Patient screened today 10- by provider and RN for enrollment to program.      Pt was deemed not a candidate for enrollment at this time related to patient refused. PT states that his problem is all about his Kidney.    Pt will require additional follow up planning per primary team.     If pt status, diagnosis, or treatment plan changes , please place AMB referral to [facility] ([id number]) for [facility] enrollment re-evalution.

## 2023-10-16 NOTE — ASSESSMENT & PLAN NOTE
67 yr old AAM with paroxysmal Afib s/p cardioversion 3/2022 (on Eliquis, Metoprolol, and Amiodarone),  HFpEF (EF 65%), kidney transplant in 4/12/2005 (bcr of 2.8-3.2), COPD, CAD s/p PCI in 2006, HTN.    Patient began to have chest pain at 1115. Patient was given nitro with relief of symptoms.  Primary team was at beside, EKG with no ST segment changes. Discussed about performing LHC with the patient for possible stenting. Patient refused due to possible nephrotoxic effects 2/2 contrasts dye as patient has CKD and has kidney transplants. Patient stated that he does not to be back on dialysis. He is aware about the risk and benefits of not performing the LHC. However, he is agreeable to medical therapy.     Plan:  - ACS protocol was initiated.   - Per pharmacy, will hold heparin gtt until 9:00 PM this evening as patient had a dose of eliquis this morning. If patient has continuing chest pain may start heparin gtt sooner. If patient is complaining of chest pain, please contact cardiology  - Aspirin 325 mg/Clopidogrel 600 mg  - Beta-Blocker: metoprolol 25 mg  - Statin: atorvastatin 10 mg  - troponin  - EKG  - possible goals of care discussion

## 2023-10-16 NOTE — ASSESSMENT & PLAN NOTE
Patient with Long standing persistent (>12 months) atrial fibrillation which is controlled currently with Beta Blocker and Amiodarone. Patient is currently in sinus rhythm.[id number] Score: 2. . Anticoagulation indicated. Anticoagulation done with apixaban.  S/p cardioversion in March of 2022    - Continue amio, metoprolol, and apixaban  - monitor on telemetry

## 2023-10-16 NOTE — PROGRESS NOTES
[facility] - Telemetry Stepdown  Cardiology  Progress Note    Patient Name: Ibrahima Phelps  MRN: [id number]  Admission Date: 10/13/2023  Hospital Length of Stay: 3 days  Code Status: Full Code   Attending Physician: Ricky Judge MD   Primary Care Physician: Anatoliy Colinrdes MD  Expected Discharge Date:   Principal Problem:<principal problem not specified>    Subjective:       Interval History: At 1115 patient began to develop substernal chest pain. Nitro was given with relief of symptoms. Primary team was called to assess the patient. EKG during that time during showed ST segment changes. Troponin was ordered. ACS protocol was initiated.     Review of Systems   Constitutional: Positive for diaphoresis. Negative for chills and fever.   HENT:  Negative for congestion.    Cardiovascular:  Positive for chest pain. Negative for dyspnea on exertion.   Respiratory:  Negative for cough and shortness of breath.    Musculoskeletal:  Negative for back pain.   Gastrointestinal:  Negative for abdominal pain, diarrhea, nausea and vomiting.   Neurological:  Negative for headaches.     Objective:     Vital Signs (Most Recent):  Temp: 98 °F (36.7 °C) (10/16/23 1100)  Pulse: 68 (10/16/23 1214)  Resp: 14 (10/16/23 1214)  BP: (!) 161/67 (10/16/23 1100)  SpO2: 96 % (10/16/23 1214) Vital Signs (24h Range):  Temp:  [97.8 °F (36.6 °C)-98.7 °F (37.1 °C)] 98 °F (36.7 °C)  Pulse:  [53-78] 68  Resp:  [14-20] 14  SpO2:  [96 %-100 %] 96 %  BP: (145-202)/(63-88) 161/67     Weight: 93.9 kg (207 lb 0.2 oz)  Body mass index is 27.31 kg/m².     SpO2: 96 %         Intake/Output Summary (Last 24 hours) at 10/16/2023 1326  Last data filed at 10/16/2023 0140  Gross per 24 hour   Intake --   Output 420 ml   Net -420 ml       Lines/Drains/Airways       Peripheral Intravenous Line  Duration                  Peripheral IV - Single Lumen 10/13/23 1500 20 G;1 3/4 in Anterior;Right Upper Arm 2 days                       Physical Exam  Constitutional:        General: He is not in acute distress.     Appearance: He is not ill-appearing.   HENT:      Nose: Nose normal.      Mouth/Throat:      Mouth: Mucous membranes are moist.   Eyes:      Pupils: Pupils are equal, round, and reactive to light.   Neck:      Comments: No JVD appreciated   Cardiovascular:      Rate and Rhythm: Normal rate and regular rhythm.      Pulses: Normal pulses.      Heart sounds: No murmur heard.  Pulmonary:      Effort: Pulmonary effort is normal. No respiratory distress.      Breath sounds: No wheezing or rales.   Abdominal:      General: Abdomen is flat. There is no distension.      Palpations: Abdomen is soft.      Tenderness: There is no right CVA tenderness or left CVA tenderness.   Musculoskeletal:         General: No swelling.      Cervical back: Normal range of motion and neck supple. No tenderness.      Right lower leg: Edema (1+ pitting) present.      Left lower leg: Edema (1+ pitting) present.   Skin:     General: Skin is warm.      Coloration: Skin is not pale.      Findings: No rash.   Neurological:      General: No focal deficit present.      Mental Status: He is alert and oriented to person, place, and time.      Motor: No weakness.            Significant Labs: CMP   Recent Labs   Lab 10/15/23  0439 10/16/23  0425 10/16/23  1112    141 141   K 3.8 3.8 3.4*    109 106   CO2 25 21* 22*   GLU 84 82 96   BUN 32* 36* 34*   CREATININE 4.5* 4.4* 4.7*   CALCIUM 7.7* 7.4* 7.5*   PROT 5.6* 5.4*  --    ALBUMIN 2.5* 2.4*  --    BILITOT 0.3 0.4  --    ALKPHOS 55 52*  --    AST 17 18  --    ALT 16 16  --    ANIONGAP 9 11 13    and CBC   Recent Labs   Lab 10/14/23  1926 10/15/23  0439 10/16/23  0425   WBC 7.39 8.34 7.46   HGB 7.2* 7.0* 6.9*   HCT 22.7* 22.3* 21.8*    141* 152       Significant Imaging:   Results for orders placed or performed during the hospital encounter of 10/13/23   Echo   Result Value Ref Range    Ascending aorta 2.91 cm    STJ 2.78 cm    AV mean gradient 9  mmHg    Ao peak aris 2.12 m/s    Ao VTI 49.26 cm    IVRT 114.18 msec    IVS 1.02 0.6 - 1.1 cm    LA size 4.49 cm    Left Atrium Major Axis 6.82 cm    Left Atrium Minor Axis 6.21 cm    LVIDd 5.74 3.5 - 6.0 cm    LVIDs 4.31 (A) 2.1 - 4.0 cm    LVOT diameter 2.2 cm    LVOT peak VTI 24.00 cm    Posterior Wall 0.84 0.6 - 1.1 cm    MV Peak A Aris 0.74 m/s    E wave deceleration time 217.91 msec    MV Peak E Aris 0.96 m/s    RA Major Axis 5.75 cm    RA Width 4.01 cm    RVDD 3.18 cm    Sinus 3.37 cm    TAPSE 2.09 cm    TR Max Aris 3.74 m/s    LA WIDTH 5.14 cm    LV Diastolic Volume 162.44 mL    LV Systolic Volume 83.57 mL    LVOT peak aris 1.15 m/s    TDI LATERAL 0.09 m/s    TDI SEPTAL 0.07 m/s    LA volume (mod) 117.00 cm3    LV LATERAL E/E' RATIO 10.67 m/s    LV SEPTAL E/E' RATIO 13.71 m/s    FS 25 %    LA volume 127.52 cm3    LV mass 208.48 g    ZLVIDD -2.39     ZLVIDS -0.22     Left Ventricle Relative Wall Thickness 0.29 cm    AV valve area 1.85 cm²    AV Velocity Ratio 0.54     AV index (prosthetic) 0.49     E/A ratio 1.30     Mean e' 0.08 m/s    LVOT area 3.8 cm2    LVOT stroke volume 91.19 cm3    AV peak gradient 18 mmHg    E/E' ratio 12.00 m/s    LV Systolic Volume Index 38.3 mL/m2    LV Diastolic Volume Index 74.51 mL/m2    LA Volume Index 58.5 mL/m2    LV Mass Index 96 g/m2    Triscuspid Valve Regurgitation Peak Gradient 56 mmHg    LA Volume Index (Mod) 53.7 mL/m2    VERONICA by Velocity Ratio 2.06 cm²    BSA 2.2 m2    TV resting pulmonary artery pressure 59 mmHg    RV TB RVSP 7 mmHg    Est. RA pres 3 mmHg    Narrative      Left Ventricle: The left ventricle is normal in size. Ventricular mass   is normal. Normal wall thickness. Normal wall motion. There is normal   systolic function with a visually estimated ejection fraction of 60 - 65%.   Grade II diastolic dysfunction.    Left Atrium: Left atrium is severely dilated.    Right Ventricle: Normal right ventricular cavity size. Wall thickness   is normal. Right ventricle  wall motion  is normal. Systolic function is   normal.    Right Atrium: Right atrium is dilated.    Aortic Valve: Moderately calcified cusps. There is moderate annular   calcification present. Mildly restricted motion. There is mild stenosis.   Aortic valve area by VTI is 1.85 cm². Aortic valve peak velocity is 2.12   m/s. Mean gradient is 9 mmHg. The dimensionless index is 0.49. There is   mild to moderate aortic regurgitation.    Pulmonary Artery: The estimated pulmonary artery systolic pressure is   59 mmHg.    IVC/SVC: Normal venous pressure at 3 mmHg.       Assessment and Plan:     Unstable angina pectoris  67 yr old AAM with paroxysmal Afib s/p cardioversion 3/2022 (on Eliquis, Metoprolol, and Amiodarone),  HFpEF (EF 65%), kidney transplant in 4/12/2005 (bcr of 2.8-3.2), COPD, CAD s/p PCI in 2006, HTN.    Patient began to have chest pain at 1115. Patient was given nitro with relief of symptoms.  Primary team was at beside, EKG with no ST segment changes. Discussed about performing LHC with the patient for possible stenting. Patient refused due to possible nephrotoxic effects 2/2 contrasts dye as patient has CKD and has kidney transplants. Patient stated that he does not to be back on dialysis. He is aware about the risk and benefits of not performing the LHC. However, he is agreeable to medical therapy.     Plan:  - ACS protocol was initiated.   - Per pharmacy, will hold heparin gtt until 9:00 PM this evening as patient had a dose of eliquis this morning. If patient has continuing chest pain may start heparin gtt sooner. If patient is complaining of chest pain, please contact cardiology  - Aspirin 325 mg/Clopidogrel 600 mg  - Beta-Blocker: metoprolol 25 mg  - Statin: atorvastatin 10 mg  - troponin  - EKG  - possible goals of care discussion       Acute on chronic heart failure with preserved ejection fraction  Echo    Interpretation Summary    Left Ventricle: The left ventricle is normal in size. Ventricular  mass is normal. Normal wall thickness. Normal wall motion. There is normal systolic function with a visually estimated ejection fraction of 60 - 65%. Grade II diastolic dysfunction.    Left Atrium: Left atrium is severely dilated.    Right Ventricle: Normal right ventricular cavity size. Wall thickness is normal. Right ventricle wall motion  is normal. Systolic function is normal.    Right Atrium: Right atrium is dilated.    Aortic Valve: Moderately calcified cusps. There is moderate annular calcification present. Mildly restricted motion. There is mild stenosis. Aortic valve area by VTI is 1.85 cm². Aortic valve peak velocity is 2.12 m/s. Mean gradient is 9 mmHg. The dimensionless index is 0.49. There is mild to moderate aortic regurgitation.    Pulmonary Artery: The estimated pulmonary artery systolic pressure is 59 mmHg.    IVC/SVC: Normal venous pressure at 3 mmHg.    Patient is euvolemic, no JVD, lung exam clear to auscultation, 1+ pitting edema bilateral LE, Echo revealing normal venous pressure.     Plan:   - Ok to hold lasix 2/2 to elevated creatinine and per KTM.   - Continue hemodynamic management.  - continue BilDil   - Continue metoprolol 25 mg, nifedipine 60 mg    Cardiology will continue to follow.       VTE Risk Mitigation (From admission, onward)           Ordered     heparin 25,000 units in dextrose 5% (100 units/ml) IV bolus from bag INITIAL BOLUS (max bolus 4000 units)  Once        Question:  Heparin Infusion Adjustment (DO NOT MODIFY ANSWER)  Answer:  [email or web address]\epic\Images\Pharmacy\HeparinInfusions\heparin LOW INTENSITY nomogram for [facility] [id number]    10/16/23 1133     heparin 25,000 units in dextrose 5% 250 mL (100 units/mL) infusion LOW INTENSITY nomogram - [facility]  Continuous        Question:  Begin at (units/kg/hr)  Answer:  12    10/16/23 1133     heparin 25,000 units in dextrose 5% (100 units/ml) IV bolus from bag - ADDITIONAL PRN BOLUS - 60 units/kg (max bolus 4000 units)  As needed (PRN)         Question:  Heparin Infusion Adjustment (DO NOT MODIFY ANSWER)  Answer:  [email or web address]\epic\Images\Pharmacy\HeparinInfusions\heparin LOW INTENSITY nomogram for [facility] [id number]    10/16/23 1133     heparin 25,000 units in dextrose 5% (100 units/ml) IV bolus from bag - ADDITIONAL PRN BOLUS - 30 units/kg (max bolus 4000 units)  As needed (PRN)        Question:  Heparin Infusion Adjustment (DO NOT MODIFY ANSWER)  Answer:  [email or web address]\epic\Images\Pharmacy\HeparinInfusions\heparin LOW INTENSITY nomogram for [facility] [id number]    10/16/23 1133                    Angelo Magdaleno DO  Cardiology  [facility] - Telemetry Stepdown

## 2023-10-17 LAB
ALBUMIN SERPL BCP-MCNC: 2.4 G/DL (ref 3.5–5.2)
ALP SERPL-CCNC: 58 U/L (ref 55–135)
ALT SERPL W/O P-5'-P-CCNC: 14 U/L (ref 10–44)
ANION GAP SERPL CALC-SCNC: 11 MMOL/L (ref 8–16)
APTT PPP: 42.1 SEC (ref 21–32)
APTT PPP: 49.1 SEC (ref 21–32)
AST SERPL-CCNC: 16 U/L (ref 10–40)
BASOPHILS # BLD AUTO: 0.02 K/UL (ref 0–0.2)
BASOPHILS NFR BLD: 0.3 % (ref 0–1.9)
BILIRUB SERPL-MCNC: 0.3 MG/DL (ref 0.1–1)
BUN SERPL-MCNC: 35 MG/DL (ref 8–23)
CALCIUM SERPL-MCNC: 7.1 MG/DL (ref 8.7–10.5)
CHLORIDE SERPL-SCNC: 108 MMOL/L (ref 95–110)
CO2 SERPL-SCNC: 22 MMOL/L (ref 23–29)
CREAT SERPL-MCNC: 4.7 MG/DL (ref 0.5–1.4)
DIFFERENTIAL METHOD: ABNORMAL
EOSINOPHIL # BLD AUTO: 0.5 K/UL (ref 0–0.5)
EOSINOPHIL NFR BLD: 6.5 % (ref 0–8)
ERYTHROCYTE [DISTWIDTH] IN BLOOD BY AUTOMATED COUNT: 17.7 % (ref 11.5–14.5)
ERYTHROCYTE [DISTWIDTH] IN BLOOD BY AUTOMATED COUNT: 17.7 % (ref 11.5–14.5)
EST. GFR  (NO RACE VARIABLE): 12.8 ML/MIN/1.73 M^2
GLUCOSE SERPL-MCNC: 84 MG/DL (ref 70–110)
HCT VFR BLD AUTO: 23.4 % (ref 40–54)
HCT VFR BLD AUTO: 25 % (ref 40–54)
HGB BLD-MCNC: 7.3 G/DL (ref 14–18)
HGB BLD-MCNC: 7.9 G/DL (ref 14–18)
IMM GRANULOCYTES # BLD AUTO: 0.01 K/UL (ref 0–0.04)
IMM GRANULOCYTES NFR BLD AUTO: 0.1 % (ref 0–0.5)
IRON SERPL-MCNC: 34 UG/DL (ref 45–160)
LYMPHOCYTES # BLD AUTO: 1.7 K/UL (ref 1–4.8)
LYMPHOCYTES NFR BLD: 21.9 % (ref 18–48)
MAGNESIUM SERPL-MCNC: 2.4 MG/DL (ref 1.6–2.6)
MCH RBC QN AUTO: 24.4 PG (ref 27–31)
MCH RBC QN AUTO: 25.3 PG (ref 27–31)
MCHC RBC AUTO-ENTMCNC: 31.2 G/DL (ref 32–36)
MCHC RBC AUTO-ENTMCNC: 31.6 G/DL (ref 32–36)
MCV RBC AUTO: 78 FL (ref 82–98)
MCV RBC AUTO: 80 FL (ref 82–98)
MONOCYTES # BLD AUTO: 0.7 K/UL (ref 0.3–1)
MONOCYTES NFR BLD: 8.7 % (ref 4–15)
NEUTROPHILS # BLD AUTO: 4.8 K/UL (ref 1.8–7.7)
NEUTROPHILS NFR BLD: 62.5 % (ref 38–73)
NRBC BLD-RTO: 0 /100 WBC
PHOSPHATE SERPL-MCNC: 5 MG/DL (ref 2.7–4.5)
PLATELET # BLD AUTO: 139 K/UL (ref 150–450)
PLATELET # BLD AUTO: 152 K/UL (ref 150–450)
PMV BLD AUTO: 12 FL (ref 9.2–12.9)
PMV BLD AUTO: ABNORMAL FL (ref 9.2–12.9)
POTASSIUM SERPL-SCNC: 3.6 MMOL/L (ref 3.5–5.1)
PROT SERPL-MCNC: 5.6 G/DL (ref 6–8.4)
RBC # BLD AUTO: 2.99 M/UL (ref 4.6–6.2)
RBC # BLD AUTO: 3.12 M/UL (ref 4.6–6.2)
RETICS/RBC NFR AUTO: 1.5 % (ref 0.4–2)
SATURATED IRON: 19 % (ref 20–50)
SODIUM SERPL-SCNC: 141 MMOL/L (ref 136–145)
TACROLIMUS BLD-MCNC: 3.5 NG/ML (ref 5–15)
TOTAL IRON BINDING CAPACITY: 179 UG/DL (ref 250–450)
TRANSFERRIN SERPL-MCNC: 121 MG/DL (ref 200–375)
WBC # BLD AUTO: 7.71 K/UL (ref 3.9–12.7)
WBC # BLD AUTO: 7.97 K/UL (ref 3.9–12.7)

## 2023-10-17 PROCEDURE — 85730 THROMBOPLASTIN TIME PARTIAL: CPT

## 2023-10-17 PROCEDURE — 20600001 HC STEP DOWN PRIVATE ROOM

## 2023-10-17 PROCEDURE — 83735 ASSAY OF MAGNESIUM: CPT

## 2023-10-17 PROCEDURE — 80053 COMPREHEN METABOLIC PANEL: CPT

## 2023-10-17 PROCEDURE — 99232 PR SUBSEQUENT HOSPITAL CARE,LEVL II: ICD-10-PCS | Mod: GC,,, | Performed by: INTERNAL MEDICINE

## 2023-10-17 PROCEDURE — 85730 THROMBOPLASTIN TIME PARTIAL: CPT | Mod: 91 | Performed by: STUDENT IN AN ORGANIZED HEALTH CARE EDUCATION/TRAINING PROGRAM

## 2023-10-17 PROCEDURE — 99233 SBSQ HOSP IP/OBS HIGH 50: CPT | Mod: P4,GC,, | Performed by: STUDENT IN AN ORGANIZED HEALTH CARE EDUCATION/TRAINING PROGRAM

## 2023-10-17 PROCEDURE — 84466 ASSAY OF TRANSFERRIN: CPT | Performed by: INTERNAL MEDICINE

## 2023-10-17 PROCEDURE — 63600175 PHARM REV CODE 636 W HCPCS: Performed by: INTERNAL MEDICINE

## 2023-10-17 PROCEDURE — 99232 SBSQ HOSP IP/OBS MODERATE 35: CPT | Mod: GC,,, | Performed by: INTERNAL MEDICINE

## 2023-10-17 PROCEDURE — 84100 ASSAY OF PHOSPHORUS: CPT

## 2023-10-17 PROCEDURE — 63600175 PHARM REV CODE 636 W HCPCS

## 2023-10-17 PROCEDURE — 25000003 PHARM REV CODE 250

## 2023-10-17 PROCEDURE — 99233 PR SUBSEQUENT HOSPITAL CARE,LEVL III: ICD-10-PCS | Mod: P4,GC,, | Performed by: STUDENT IN AN ORGANIZED HEALTH CARE EDUCATION/TRAINING PROGRAM

## 2023-10-17 PROCEDURE — 85045 AUTOMATED RETICULOCYTE COUNT: CPT | Performed by: INTERNAL MEDICINE

## 2023-10-17 PROCEDURE — 99233 PR SUBSEQUENT HOSPITAL CARE,LEVL III: ICD-10-PCS | Mod: ,,, | Performed by: INTERNAL MEDICINE

## 2023-10-17 PROCEDURE — 80197 ASSAY OF TACROLIMUS: CPT | Performed by: INTERNAL MEDICINE

## 2023-10-17 PROCEDURE — 99233 SBSQ HOSP IP/OBS HIGH 50: CPT | Mod: ,,, | Performed by: INTERNAL MEDICINE

## 2023-10-17 PROCEDURE — 25000003 PHARM REV CODE 250: Performed by: STUDENT IN AN ORGANIZED HEALTH CARE EDUCATION/TRAINING PROGRAM

## 2023-10-17 PROCEDURE — 25000003 PHARM REV CODE 250: Performed by: INTERNAL MEDICINE

## 2023-10-17 PROCEDURE — 85025 COMPLETE CBC W/AUTO DIFF WBC: CPT

## 2023-10-17 PROCEDURE — 36415 COLL VENOUS BLD VENIPUNCTURE: CPT | Performed by: INTERNAL MEDICINE

## 2023-10-17 RX ORDER — SODIUM BICARBONATE 650 MG/1
650 TABLET ORAL 2 TIMES DAILY
Status: DISCONTINUED | OUTPATIENT
Start: 2023-10-17 | End: 2023-10-20 | Stop reason: HOSPADM

## 2023-10-17 RX ORDER — HYDROCODONE BITARTRATE AND ACETAMINOPHEN 500; 5 MG/1; MG/1
TABLET ORAL
Status: CANCELLED | OUTPATIENT
Start: 2023-10-17

## 2023-10-17 RX ORDER — HYDRALAZINE HYDROCHLORIDE 50 MG/1
50 TABLET, FILM COATED ORAL EVERY 8 HOURS
Status: DISCONTINUED | OUTPATIENT
Start: 2023-10-17 | End: 2023-10-18

## 2023-10-17 RX ORDER — IPRATROPIUM BROMIDE 42 UG/1
2 SPRAY, METERED NASAL 4 TIMES DAILY PRN
Status: DISCONTINUED | OUTPATIENT
Start: 2023-10-17 | End: 2023-10-20 | Stop reason: HOSPADM

## 2023-10-17 RX ORDER — IPRATROPIUM BROMIDE 42 UG/1
2 SPRAY, METERED NASAL 4 TIMES DAILY
Status: DISCONTINUED | OUTPATIENT
Start: 2023-10-17 | End: 2023-10-17

## 2023-10-17 RX ORDER — HYDROCODONE BITARTRATE AND ACETAMINOPHEN 500; 5 MG/1; MG/1
TABLET ORAL
Status: DISCONTINUED | OUTPATIENT
Start: 2023-10-17 | End: 2023-10-20 | Stop reason: HOSPADM

## 2023-10-17 RX ADMIN — PREDNISONE 5 MG: 5 TABLET ORAL at 08:10

## 2023-10-17 RX ADMIN — SODIUM BICARBONATE 650 MG TABLET 650 MG: at 08:10

## 2023-10-17 RX ADMIN — ALUMINUM HYDROXIDE, MAGNESIUM HYDROXIDE, AND DIMETHICONE 30 ML: 400; 400; 40 SUSPENSION ORAL at 01:10

## 2023-10-17 RX ADMIN — SODIUM BICARBONATE 650 MG TABLET 650 MG: at 10:10

## 2023-10-17 RX ADMIN — ASPIRIN 81 MG: 81 TABLET, COATED ORAL at 08:10

## 2023-10-17 RX ADMIN — NIFEDIPINE 60 MG: 30 TABLET, FILM COATED, EXTENDED RELEASE ORAL at 08:10

## 2023-10-17 RX ADMIN — HEPARIN SODIUM 12 UNITS/KG/HR: 10000 INJECTION, SOLUTION INTRAVENOUS at 01:10

## 2023-10-17 RX ADMIN — ALUMINUM HYDROXIDE, MAGNESIUM HYDROXIDE, AND DIMETHICONE 30 ML: 400; 400; 40 SUSPENSION ORAL at 08:10

## 2023-10-17 RX ADMIN — AMIODARONE HYDROCHLORIDE 200 MG: 200 TABLET ORAL at 08:10

## 2023-10-17 RX ADMIN — DOXAZOSIN 4 MG: 2 TABLET ORAL at 08:10

## 2023-10-17 RX ADMIN — ATORVASTATIN CALCIUM 10 MG: 10 TABLET, FILM COATED ORAL at 08:10

## 2023-10-17 RX ADMIN — FAMOTIDINE 20 MG: 20 TABLET ORAL at 08:10

## 2023-10-17 RX ADMIN — METOPROLOL SUCCINATE 25 MG: 25 TABLET, EXTENDED RELEASE ORAL at 08:10

## 2023-10-17 RX ADMIN — TACROLIMUS 3 MG: 1 CAPSULE ORAL at 06:10

## 2023-10-17 RX ADMIN — ISOSORBIDE DINITRATE: 20 TABLET ORAL at 08:10

## 2023-10-17 RX ADMIN — CLOPIDOGREL BISULFATE 75 MG: 75 TABLET ORAL at 08:10

## 2023-10-17 RX ADMIN — TACROLIMUS 3 MG: 1 CAPSULE ORAL at 08:10

## 2023-10-17 RX ADMIN — HYDRALAZINE HYDROCHLORIDE 50 MG: 50 TABLET ORAL at 01:10

## 2023-10-17 RX ADMIN — ISOSORBIDE DINITRATE 30 MG: 20 TABLET ORAL at 02:10

## 2023-10-17 RX ADMIN — HYDRALAZINE HYDROCHLORIDE 50 MG: 50 TABLET ORAL at 11:10

## 2023-10-17 RX ADMIN — ISOSORBIDE DINITRATE 30 MG: 20 TABLET ORAL at 08:10

## 2023-10-17 NOTE — ASSESSMENT & PLAN NOTE
Patient admitted with shortness of breath, edema, PND, orthopnea, consistent with acute on chronic diastolic CHF exacerbation.  Last TTE in February of 2023; EF 65% with Grade II Diastolic Dysfunction    BNP: BNP  Recent Labs   Lab 10/13/23  0836   BNP 1,168*     CXR: No significant pleural fluid, evidence of patchy consolidation    Troponin:   Recent Labs   Lab 10/16/23  1112   TROPONINI 0.084*       Home Meds:  Prescribed Lasix 40mg though not taking, states he took 20mg every other day over past week 2/2 swelling    Plan:  - hold lasix per KTM (in setting of ATN)  - Transition BilDil to Imdur  - Metoprolol 25, Nifedipine 60, and Hydral 50 TID  - Strict ins and outs

## 2023-10-17 NOTE — ASSESSMENT & PLAN NOTE
Palliative consulted on 10/16, see note  - not amenable to discussing code status on initial encounter

## 2023-10-17 NOTE — SUBJECTIVE & OBJECTIVE
Interval History: No episodes of chest pain overnight. Patient doing better this morning. Cardiology following, advising on medical management of angina and will decide whether or not to perform stress PET.     Review of Systems   Constitutional:  Negative for chills, fatigue and fever.   HENT:  Negative for ear pain and sinus pain.    Eyes:  Negative for photophobia, pain and visual disturbance.   Respiratory:  Negative for chest tightness, shortness of breath and wheezing.    Cardiovascular:  Positive for chest pain. Negative for leg swelling.   Gastrointestinal:  Negative for abdominal pain, blood in stool, constipation, diarrhea, nausea and vomiting.   Endocrine: Negative for polydipsia and polyuria.   Genitourinary:  Negative for difficulty urinating, dysuria, flank pain, frequency, penile pain and testicular pain.   Musculoskeletal:  Negative for back pain, neck pain and neck stiffness.   Skin:  Negative for color change and rash.   Neurological:  Negative for dizziness, syncope, weakness, numbness and headaches.   Psychiatric/Behavioral:  Negative for confusion and sleep disturbance.      Objective:     Vital Signs (Most Recent):  Temp: 98.5 °F (36.9 °C) (10/17/23 1420)  Pulse: (!) 59 (10/17/23 1420)  Resp: 18 (10/17/23 0814)  BP: (!) 108/55 (10/17/23 1420)  SpO2: 95 % (10/17/23 1420) Vital Signs (24h Range):  Temp:  [98 °F (36.7 °C)-98.5 °F (36.9 °C)] 98.5 °F (36.9 °C)  Pulse:  [58-66] 59  Resp:  [18-44] 18  SpO2:  [95 %-100 %] 95 %  BP: (108-170)/(55-71) 108/55     Weight: 93.9 kg (207 lb 0.2 oz)  Body mass index is 27.31 kg/m².    Intake/Output Summary (Last 24 hours) at 10/17/2023 1522  Last data filed at 10/16/2023 1843  Gross per 24 hour   Intake 250 ml   Output --   Net 250 ml           Physical Exam  Constitutional:       General: He is not in acute distress.     Appearance: He is not ill-appearing.   HENT:      Head: Normocephalic and atraumatic.      Right Ear: External ear normal.      Left Ear:  External ear normal.      Nose: Nose normal. No congestion or rhinorrhea.      Mouth/Throat:      Mouth: Mucous membranes are dry.      Pharynx: Oropharynx is clear. No oropharyngeal exudate or posterior oropharyngeal erythema.   Eyes:      General: No scleral icterus.        Right eye: No discharge.         Left eye: No discharge.      Extraocular Movements: Extraocular movements intact.      Conjunctiva/sclera: Conjunctivae normal.   Cardiovascular:      Rate and Rhythm: Normal rate and regular rhythm.      Pulses: Normal pulses.      Heart sounds: Normal heart sounds. No murmur heard.     No gallop.   Pulmonary:      Effort: No respiratory distress.      Breath sounds: Normal breath sounds. No stridor. No wheezing or rales.   Chest:      Chest wall: No tenderness.   Abdominal:      General: Abdomen is flat. Bowel sounds are normal. There is no distension.      Palpations: Abdomen is soft. There is no mass.      Tenderness: There is no abdominal tenderness. There is no guarding or rebound.      Hernia: No hernia is present.   Musculoskeletal:         General: No swelling, tenderness or deformity. Normal range of motion.      Cervical back: Normal range of motion. No rigidity or tenderness.      Right lower leg: Edema present.      Left lower leg: Edema present.   Skin:     General: Skin is warm and dry.      Capillary Refill: Capillary refill takes less than 2 seconds.      Coloration: Skin is not jaundiced.      Findings: No bruising, erythema or rash.   Neurological:      General: No focal deficit present.      Mental Status: He is alert and oriented to person, place, and time.      Cranial Nerves: No cranial nerve deficit.      Sensory: No sensory deficit.      Motor: No weakness.   Psychiatric:         Mood and Affect: Mood normal.         Behavior: Behavior normal.         Thought Content: Thought content normal.         Judgment: Judgment normal.             Significant Labs: All pertinent labs within the  past 24 hours have been reviewed.    Significant Imaging: I have reviewed all pertinent imaging results/findings within the past 24 hours.

## 2023-10-17 NOTE — ASSESSMENT & PLAN NOTE
67 yr old AAM with paroxysmal Afib s/p cardioversion 3/2022 (on Eliquis, Metoprolol, and Amiodarone),  HFpEF (EF 65%), kidney transplant in 4/12/2005 (bcr of 2.8-3.2), COPD, CAD s/p PCI in 2006, HTN.    Patient began to have chest pain at 1115. Patient was given nitro with relief of symptoms.  Primary team was at beside, EKG with no ST segment changes. Discussed about performing LHC with the patient for possible stenting. Patient refused due to possible nephrotoxic effects 2/2 contrasts dye as patient has CKD and has kidney transplants. Patient stated that he does not to be back on dialysis. He is aware about the risk and benefits of not performing the LHC. However, he is agreeable to medical therapy.     Plan:  - ACS protocol was initiated.   - heparin gtt for 48 hours, can resume Eliquis once off the heparin gtt  - Aspirin 81 mg/Clopidogrel 75 mg  - Beta-Blocker: metoprolol 25 mg  - Statin: atorvastatin 10 mg  - long-acting 60 mg Imdur for anginal pain.  - on going goals of care discussion

## 2023-10-17 NOTE — PLAN OF CARE
Problem: Adult Inpatient Plan of Care  Goal: Absence of Hospital-Acquired Illness or Injury  Intervention: Identify and Manage Fall Risk  Flowsheets (Taken 10/17/2023 0313)  Safety Promotion/Fall Prevention:   assistive device/personal item within reach   family to remain at bedside   lighting adjusted   medications reviewed  Intervention: Prevent Skin Injury  Flowsheets (Taken 10/17/2023 0313)  Body Position:   weight shifting   position changed independently  Intervention: Prevent and Manage VTE (Venous Thromboembolism) Risk  Flowsheets (Taken 10/17/2023 0313)  Activity Management: Ambulated to bathroom - L4  Goal: Optimal Comfort and Wellbeing  Intervention: Provide Person-Centered Care  Flowsheets (Taken 10/17/2023 0313)  Trust Relationship/Rapport:   questions answered   questions encouraged   emotional support provided   choices provided   thoughts/feelings acknowledged      Pt in bed NAD, safety measures in progress, call light in reach, bed in lowest position, wife remains at beside. MD came up to sign blood consents. Pt states I will think about it and let yall know in the morning if I want to do it.

## 2023-10-17 NOTE — PROGRESS NOTES
"[facility] - Telemetry [facility] Medicine  Progress Note    Patient Name: Ibrahima Phelps  MRN: [id number]  Patient Class: IP- Inpatient   Admission Date: 10/13/2023  Length of Stay: 4 days  Attending Physician: Ricky Judge MD  Primary Care Provider: Anatoliy Colindres MD        Subjective:     Principal Problem:Unstable angina pectoris        HPI:  68 yo M active smoker w/ COPD, CAD s/p CABG x1 in 2006, paroxysmal Afib s/p cardioversion 3/2022 (on Eliquis, Metoprolol, and Amiodarone), HFpEF (EF 65%; not currently taking diuretics), hiatal hernia with reflux, HTN on Hydralazine, CKD4 s/p kidney transplants in 1992 and 2005 on Tacrolimus and Prednisone, presented to the ED with his wife complaining about worsening shortness of breath/DAUGHERTY; infrequent, nonproductive cough; and severe, sharp L posterior heel pain that started a few days ago. Per the patient, he woke up last night "gasping for air," short of breath, and with substernal chest pain radiating to his back. The chest pain resolved with Omeprazole; denies chest pain at this time. The shortness of breath and infrequent, nonproductive cough typically occur at rest and are worse with walking. The severe, sharp L posterior heel pain is worse with movement and walking, and has not responded to leg elevation. Of note, he has a history of tenosynovitis. He noted significant, watery diarrhea associated with stomach pain all day yesterday that resolved with Peptobismol and water; denies diarrhea this morning. Denies fevers, chills, nausea, vomiting, constipation, dysuria, hematuria, palpitations, headache, or vision changes.     His presenting vital signs were a temperature of 98.3, pulse rate of 57-84 bpm, resp rate of 20-28, blood pressure ranging from 156-169/69-74, MAP of 104-107, and SpO2 % on RA. Troponin (0.081), Magnesium (nml), CBC (Hgb 8.2, platelets 127), CMP (CO2 19, BUN 33, Cr 4.1), and BNP (1,168) were ordered. CXR showed RLL consolidation. XR L " ankle showed calcaneal enthesopathy. CTAP showed trace bilateral pleural effusions, bibasilar dependent pulmonary opacities, new prominence/thickening of the L iliac fossa transplant kidney's ureter. Started on CTX, AZT, and Lasix      Overview/Hospital Course:  Patient admitted for further management. Patient continued having chest pain, cardiology consulted. Patient found to have elevated PA pressures concerning for pHTN which was likely contributing to shortness of breath. KTM consulted and recommended stopping lasix. Pt continuing to have chest pain in hospital, Cardiology following, initiated ACS protocol with plans to do a PET stress to assess for Ischemia, though patient not presently a candidate for LHC given CKD5. Pt not amenable to dialysis. Pt transfused 1 unit of pRBCs during admission for Hgb <7. Palliative consulted for GOC      No new subjective & objective note has been filed under this hospital service since the last note was generated.      Assessment/Plan:      * Unstable angina pectoris  Having chest pain during admission, notable episode on 10/16; troponin obtained: elevated from days prior, will trend. EKG significant for ST changes. Cardiology following    - ACS protocol initiated.   - Heparin gtt for 48 hours, with plans to resume Eliquis after once off the heparin gtt  - If patient is complaining of chest pain, inform cardiology  - ASA and Plavix loading dose initiated, followed by 81/75mg maintenance therapy  - Start metoprolol 25 mg  - Continue atorvastatin 10 mg      Consolidation lung  Patchy consolidation observed on CXR in ED. CT was largely unremarkable. Patient has worsening renal function.     - d/c antibiotics      CKD (chronic kidney disease), stage IV  Cr. Today was 4.1, baseline around 3.7-4. Managed by nephrology outpatient    - avoid nephrotoxic drugs and renally dose medications  - strict I/Os      Anemia in stage 4 chronic kidney disease  Hgb 8.2 on admit, around  "baseline    Lab Results   Component Value Date    IRON 54 02/10/2023    TIBC 142 (L) 02/10/2023    FERRITIN 1,911 (H) 02/20/2023     Lab Results   Component Value Date    FOLATE 8.8 02/28/2023     Lab Results   Component Value Date    QOFDXGCU42 278 06/20/2023     No results found for: "RETICCTPCT"    Likely secondary to CKD    Plan:   -   Lab Results   Component Value Date    HGB 6.9 (L) 10/16/2023     - Transfusion of 1u pRBCs on 10/16    Acute on chronic heart failure with preserved ejection fraction  Patient admitted with shortness of breath, edema, PND, orthopnea, consistent with acute on chronic diastolic CHF exacerbation.  Last TTE in February of 2023; EF 65% with Grade II Diastolic Dysfunction    BNP: BNP  Recent Labs   Lab 10/13/23  0836   BNP 1,168*     CXR: No significant pleural fluid, evidence of patchy consolidation    Troponin:   Recent Labs   Lab 10/16/23  1112   TROPONINI 0.084*       Home Meds:  Prescribed Lasix 40mg though not taking, states he took 20mg every other day over past week 2/2 swelling    Plan:  - hold lasix per KTM (in setting of ATN)  - Transition BilDil to Imdur  - Metoprolol 25, Nifedipine 60, and Hydral 50 TID  - Strict ins and outs           Immunodeficiency due to treatment with immunosuppressive medication  See H/o Kidney transplant    Palliative care encounter  Palliative consulted on 10/16, see note  - not amenable to discussing code status on initial encounter      Chronic obstructive pulmonary disease  Pt complaining of cough and shortness of breath, duration 3 days  Saturating appropriately on room air, no wheezing or abnormal breath sounds on auscultation     - holding home duonebs      Paroxysmal atrial fibrillation  Patient with Long standing persistent (>12 months) atrial fibrillation which is controlled currently with Beta Blocker and Amiodarone. Patient is currently in sinus rhythm.[id number] Score: 2. . Anticoagulation indicated. Anticoagulation done with " apixaban.  S/p cardioversion in March of 2022    - Continue amio, metoprolol, and apixaban  - monitor on telemetry    H/O kidney transplant  Kidney transplant in 1996 and 2003  Stopped taking Cellcept roughly a year ago per Dr. Diaz/Dr. Webb  Cr elevated to 4.1 on admission, around patients baseline     - Tacro level low; Tacrolimus goal between 4 and 6, continue 3 mg twice itzel  - Continue prednisone  - KTM consulted   - monitor for drug toxicity and interactions with other therapies daily    Long term (current) use of anticoagulants [V58.61]  Hold apixaban with plans to start heparin infusion      Mixed hyperlipidemia  Chronic and stable    - continue statin      CAD (coronary artery disease)  CABG in 2005    - Continue ASA and Statin  - Slight troponin elevation on admission, will trend      VTE Risk Mitigation (From admission, onward)           Ordered     heparin 25,000 units in dextrose 5% 250 mL (100 units/mL) infusion LOW INTENSITY nomogram - [facility]  Continuous        Question:  Begin at (units/kg/hr)  Answer:  12    10/16/23 1133     heparin 25,000 units in dextrose 5% (100 units/ml) IV bolus from bag - ADDITIONAL PRN BOLUS - 60 units/kg (max bolus 4000 units)  As needed (PRN)        Question:  Heparin Infusion Adjustment (DO NOT MODIFY ANSWER)  Answer:  \\[email or web address]\epic\Images\Pharmacy\HeparinInfusions\heparin LOW INTENSITY nomogram for [facility] [id number].pdf    10/16/23 1133     heparin 25,000 units in dextrose 5% (100 units/ml) IV bolus from bag - ADDITIONAL PRN BOLUS - 30 units/kg (max bolus 4000 units)  As needed (PRN)        Question:  Heparin Infusion Adjustment (DO NOT MODIFY ANSWER)  Answer:  \\[email or web address]\epic\Images\Pharmacy\HeparinInfusions\heparin LOW INTENSITY nomogram for [facility] [id number].pdf    10/16/23 1133                    Discharge Planning   RAMU: 10/21/2023     Code Status: Full Code   Is the patient medically ready for discharge?:     Reason for patient still in hospital (select all that  apply): Patient trending condition  Discharge Plan A: Home with family                  Ricky Judge MD  Department of Hospital Medicine   [facility] - Telemetry Stepdown

## 2023-10-17 NOTE — PROGRESS NOTES
[facility] - Telemetry Stepdown  Cardiology  Progress Note    Patient Name: Ibrahima Phelps  MRN: [id number]  Admission Date: 10/13/2023  Hospital Length of Stay: 4 days  Code Status: Full Code   Attending Physician: Ricky Judge MD   Primary Care Physician: Anatoliy Colindres MD  Expected Discharge Date: 10/21/2023  Principal Problem:<principal problem not specified>    Subjective:     Hospital Course:   No notes on file    Interval History:  Patient saw palliative medicine yesterday.  Goals of care discussion was initially will continue this discussion with palliative Care.  They attempted to discuss regarding code status however seems like he was not amenable during that time.  He denies any chest pains overnight.  No more anginal symptoms since yesterday morning.      Review of Systems   Constitutional: Negative for chills and fever.   HENT:  Negative for congestion.    Cardiovascular:  Negative for chest pain and dyspnea on exertion.   Respiratory:  Negative for cough and shortness of breath.    Musculoskeletal:  Negative for back pain.   Gastrointestinal:  Negative for abdominal pain, diarrhea, nausea and vomiting.   Neurological:  Negative for headaches.     Objective:     Vital Signs (Most Recent):  Temp: 98.1 °F (36.7 °C) (10/17/23 0814)  Pulse: (!) 58 (10/17/23 1151)  Resp: 18 (10/17/23 0814)  BP: (!) 148/67 (10/17/23 0850)  SpO2: 95 % (10/17/23 1300) Vital Signs (24h Range):  Temp:  [98 °F (36.7 °C)-98.5 °F (36.9 °C)] 98.1 °F (36.7 °C)  Pulse:  [58-67] 58  Resp:  [17-44] 18  SpO2:  [95 %-100 %] 95 %  BP: (125-170)/(57-71) 148/67     Weight: 93.9 kg (207 lb 0.2 oz)  Body mass index is 27.31 kg/m².     SpO2: 95 %         Intake/Output Summary (Last 24 hours) at 10/17/2023 1321  Last data filed at 10/16/2023 1843  Gross per 24 hour   Intake 250 ml   Output --   Net 250 ml       Lines/Drains/Airways       Peripheral Intravenous Line  Duration                  Peripheral IV - Single Lumen 10/16/23 2045 18 G  Anterior;Right;Medial;Proximal Forearm <1 day                       Physical Exam  Constitutional:       General: He is not in acute distress.     Appearance: He is not ill-appearing.   HENT:      Nose: Nose normal.      Mouth/Throat:      Mouth: Mucous membranes are moist.   Eyes:      Pupils: Pupils are equal, round, and reactive to light.   Neck:      Comments: No JVD appreciated   Cardiovascular:      Rate and Rhythm: Normal rate and regular rhythm.      Pulses: Normal pulses.      Heart sounds: No murmur heard.  Pulmonary:      Effort: Pulmonary effort is normal. No respiratory distress.      Breath sounds: No wheezing or rales.   Abdominal:      General: Abdomen is flat. There is no distension.      Palpations: Abdomen is soft.      Tenderness: There is no right CVA tenderness or left CVA tenderness.   Musculoskeletal:         General: No swelling.      Cervical back: Normal range of motion and neck supple. No tenderness.      Right lower leg: Edema (1+ pitting) present.      Left lower leg: Edema (1+ pitting) present.   Skin:     General: Skin is warm.      Coloration: Skin is not pale.      Findings: No rash.   Neurological:      General: No focal deficit present.      Mental Status: He is alert and oriented to person, place, and time.      Motor: No weakness.            Significant Labs: CMP   Recent Labs   Lab 10/16/23  0425 10/16/23  1112 10/17/23  0436    141 141   K 3.8 3.4* 3.6    106 108   CO2 21* 22* 22*   GLU 82 96 84   BUN 36* 34* 35*   CREATININE 4.4* 4.7* 4.7*   CALCIUM 7.4* 7.5* 7.1*   PROT 5.4*  --  5.6*   ALBUMIN 2.4*  --  2.4*   BILITOT 0.4  --  0.3   ALKPHOS 52*  --  58   AST 18  --  16   ALT 16  --  14   ANIONGAP 11 13 11    and CBC   Recent Labs   Lab 10/16/23  0425 10/16/23  2254 10/17/23  0436   WBC 7.46 7.97 7.71   HGB 6.9* 7.3* 7.9*   HCT 21.8* 23.4* 25.0*    139* 152       Significant Imaging: Echocardiogram: Transthoracic echo (TTE) complete (Cupid Only):   Results  for orders placed or performed during the hospital encounter of 10/13/23   Echo   Result Value Ref Range    Ascending aorta 2.91 cm    STJ 2.78 cm    AV mean gradient 9 mmHg    Ao peak aris 2.12 m/s    Ao VTI 49.26 cm    IVRT 114.18 msec    IVS 1.02 0.6 - 1.1 cm    LA size 4.49 cm    Left Atrium Major Axis 6.82 cm    Left Atrium Minor Axis 6.21 cm    LVIDd 5.74 3.5 - 6.0 cm    LVIDs 4.31 (A) 2.1 - 4.0 cm    LVOT diameter 2.2 cm    LVOT peak VTI 24.00 cm    Posterior Wall 0.84 0.6 - 1.1 cm    MV Peak A Aris 0.74 m/s    E wave deceleration time 217.91 msec    MV Peak E Aris 0.96 m/s    RA Major Axis 5.75 cm    RA Width 4.01 cm    RVDD 3.18 cm    Sinus 3.37 cm    TAPSE 2.09 cm    TR Max Aris 3.74 m/s    LA WIDTH 5.14 cm    LV Diastolic Volume 162.44 mL    LV Systolic Volume 83.57 mL    LVOT peak aris 1.15 m/s    TDI LATERAL 0.09 m/s    TDI SEPTAL 0.07 m/s    LA volume (mod) 117.00 cm3    LV LATERAL E/E' RATIO 10.67 m/s    LV SEPTAL E/E' RATIO 13.71 m/s    FS 25 %    LA volume 127.52 cm3    LV mass 208.48 g    ZLVIDD -2.39     ZLVIDS -0.22     Left Ventricle Relative Wall Thickness 0.29 cm    AV valve area 1.85 cm²    AV Velocity Ratio 0.54     AV index (prosthetic) 0.49     E/A ratio 1.30     Mean e' 0.08 m/s    LVOT area 3.8 cm2    LVOT stroke volume 91.19 cm3    AV peak gradient 18 mmHg    E/E' ratio 12.00 m/s    LV Systolic Volume Index 38.3 mL/m2    LV Diastolic Volume Index 74.51 mL/m2    LA Volume Index 58.5 mL/m2    LV Mass Index 96 g/m2    Triscuspid Valve Regurgitation Peak Gradient 56 mmHg    LA Volume Index (Mod) 53.7 mL/m2    VERONICA by Velocity Ratio 2.06 cm²    BSA 2.2 m2    TV resting pulmonary artery pressure 59 mmHg    RV TB RVSP 7 mmHg    Est. RA pres 3 mmHg    Narrative      Left Ventricle: The left ventricle is normal in size. Ventricular mass   is normal. Normal wall thickness. Normal wall motion. There is normal   systolic function with a visually estimated ejection fraction of 60 - 65%.   Grade II  diastolic dysfunction.    Left Atrium: Left atrium is severely dilated.    Right Ventricle: Normal right ventricular cavity size. Wall thickness   is normal. Right ventricle wall motion  is normal. Systolic function is   normal.    Right Atrium: Right atrium is dilated.    Aortic Valve: Moderately calcified cusps. There is moderate annular   calcification present. Mildly restricted motion. There is mild stenosis.   Aortic valve area by VTI is 1.85 cm². Aortic valve peak velocity is 2.12   m/s. Mean gradient is 9 mmHg. The dimensionless index is 0.49. There is   mild to moderate aortic regurgitation.    Pulmonary Artery: The estimated pulmonary artery systolic pressure is   59 mmHg.    IVC/SVC: Normal venous pressure at 3 mmHg.       Assessment and Plan:       Unstable angina pectoris  67 yr old AAM with paroxysmal Afib s/p cardioversion 3/2022 (on Eliquis, Metoprolol, and Amiodarone),  HFpEF (EF 65%), kidney transplant in 4/12/2005 (bcr of 2.8-3.2), COPD, CAD s/p PCI in 2006, HTN.    Patient began to have chest pain at 1115. Patient was given nitro with relief of symptoms.  Primary team was at beside, EKG with no ST segment changes. Discussed about performing LHC with the patient for possible stenting. Patient refused due to possible nephrotoxic effects 2/2 contrasts dye as patient has CKD and has kidney transplants. Patient stated that he does not to be back on dialysis. He is aware about the risk and benefits of not performing the LHC. However, he is agreeable to medical therapy.     Plan:  - ACS protocol was initiated.   - heparin gtt for 48 hours, can resume Eliquis once off the heparin gtt  - Aspirin 81 mg/Clopidogrel 75 mg  - Beta-Blocker: metoprolol 25 mg  - Statin: atorvastatin 10 mg  - long-acting 60 mg Imdur for anginal pain.  - on going goals of care discussion       Acute on chronic heart failure with preserved ejection fraction  Echo    Interpretation Summary    Left Ventricle: The left ventricle  is normal in size. Ventricular mass is normal. Normal wall thickness. Normal wall motion. There is normal systolic function with a visually estimated ejection fraction of 60 - 65%. Grade II diastolic dysfunction.    Left Atrium: Left atrium is severely dilated.    Right Ventricle: Normal right ventricular cavity size. Wall thickness is normal. Right ventricle wall motion  is normal. Systolic function is normal.    Right Atrium: Right atrium is dilated.    Aortic Valve: Moderately calcified cusps. There is moderate annular calcification present. Mildly restricted motion. There is mild stenosis. Aortic valve area by VTI is 1.85 cm². Aortic valve peak velocity is 2.12 m/s. Mean gradient is 9 mmHg. The dimensionless index is 0.49. There is mild to moderate aortic regurgitation.    Pulmonary Artery: The estimated pulmonary artery systolic pressure is 59 mmHg.    IVC/SVC: Normal venous pressure at 3 mmHg.    Patient is euvolemic, no JVD, lung exam clear to auscultation, 1+ pitting edema bilateral lower extremities.    Plan:   - Ok to hold lasix 2/2 to elevated creatinine and per KTM.   - optimize blood pressure management/GDMT   - Continue metoprolol 25 mg, nifedipine 60 mg, hydralazine 50 mg t.i.d.    Cardiology will continue follow.  Please contact us for any questions.      VTE Risk Mitigation (From admission, onward)         Ordered     heparin 25,000 units in dextrose 5% 250 mL (100 units/mL) infusion LOW INTENSITY nomogram - [facility]  Continuous        Question:  Begin at (units/kg/hr)  Answer:  12    10/16/23 1133     heparin 25,000 units in dextrose 5% (100 units/ml) IV bolus from bag - ADDITIONAL PRN BOLUS - 60 units/kg (max bolus 4000 units)  As needed (PRN)        Question:  Heparin Infusion Adjustment (DO NOT MODIFY ANSWER)  Answer:  [email or web address]\epic\Images\Pharmacy\HeparinInfusions\heparin LOW INTENSITY nomogram for [facility] [id number].pdf    10/16/23 1133     heparin 25,000 units in dextrose 5% (100 units/ml) IV  bolus from bag - ADDITIONAL PRN BOLUS - 30 units/kg (max bolus 4000 units)  As needed (PRN)        Question:  Heparin Infusion Adjustment (DO NOT MODIFY ANSWER)  Answer:  [email or web address]\epic\Images\Pharmacy\HeparinInfusions\heparin LOW INTENSITY nomogram for [facility] [id number].pdf    10/16/23 1133                Angelo Magdaleno DO  Cardiology  [facility] - Tele[facility] Stepdown

## 2023-10-17 NOTE — PLAN OF CARE
Problem: Adult Inpatient Plan of Care  Goal: Plan of Care Review  Outcome: Ongoing, Progressing  Goal: Patient-Specific Goal (Individualized)  Outcome: Ongoing, Progressing  Goal: Absence of Hospital-Acquired Illness or Injury  Outcome: Ongoing, Progressing  Goal: Optimal Comfort and Wellbeing  Outcome: Ongoing, Progressing  Goal: Readiness for Transition of Care  Outcome: Ongoing, Progressing     Problem: Fluid and Electrolyte Imbalance (Acute Kidney Injury/Impairment)  Goal: Fluid and Electrolyte Balance  Outcome: Ongoing, Progressing     Problem: Oral Intake Inadequate (Acute Kidney Injury/Impairment)  Goal: Optimal Nutrition Intake  Outcome: Ongoing, Progressing     Problem: Renal Function Impairment (Acute Kidney Injury/Impairment)  Goal: Effective Renal Function  Outcome: Ongoing, Progressing     Problem: Pain Acute  Goal: Acceptable Pain Control and Functional Ability  Outcome: Ongoing, Progressing     Problem: Coping Ineffective  Goal: Effective Coping  Outcome: Ongoing, Progressing     Problem: Fall Injury Risk  Goal: Absence of Fall and Fall-Related Injury  Outcome: Ongoing, Progressing     Pt AAOx4. VSS. In bed majority of shift but did sit in chair. No complaints of pain or discomfort. Routine meds administered. Heparin infusing at 12u/kg/hr. Bedside monitoring in place. Safety measures maintained. Spouse at bedside. POC reviewed with pt and spouse, verbalizes understanding.  Monitoring ongoing.

## 2023-10-17 NOTE — PROGRESS NOTES
[facility] - Telemetry Stepdown  Transplant Nephrology  Progress Note    Patient Name: Ibrahima Phelps  MRN: [id number]  Admission Date: 10/13/2023  Hospital Length of Stay: 4 days  Attending Provider: Ricky Judge MD   Primary Care Physician: Anatoliy Colindres MD  Principal Problem:<principal problem not specified>    Consults  Subjective:     Interval History: 67 yr old AAM with hx of kidney transplant in 4/12/2005 (bcr of 2.8-3.2), COPD, CAD s/p CABG in 2006 with active smoking, AFIB on amiodarone/Eliquis, HTN who is admitted for shortness of breath as well as dyspnea on exertion. Patient denies any fever but does admit to some congestion. Positive for cough but negative for nausea, vomiting, abdominal pain. Denies any diarrhea or swelling of his legs. Of note, patient admits to chest pain (present earlier this morning as well).    On arrival patient noted to be afebrile, mildly hypertensive with 100% on RA. Labs without any leukocytosis, creatinine of 4.1, BNP of 1168 with CXR showing RLL consolidation with CT chest findings compatible with pulmonary edema, severe coronary disease. Patient started on lasix IV with net negative 900 L.     Overnight: Patient reports feeling better today, denies headaches and CP that he had yesterday.    Review of patient's allergies indicates:   Allergen Reactions    Ace inhibitors Swelling    Verapamil Other (See Comments)     Other reaction(s): Unknown    Povidone-iodine Itching     Current Facility-Administered Medications   Medication Frequency    0.9%  NaCl infusion (for blood administration) Q24H PRN    0.9%  NaCl infusion (for blood administration) Q24H PRN    acetaminophen tablet 650 mg Q6H PRN    aluminum & magnesium hydroxide-simethicone 400-400-40 mg/5 mL suspension 30 mL Q6H    amiodarone tablet 200 mg Daily    aspirin EC tablet 81 mg Daily    atorvastatin tablet 10 mg Daily    clopidogreL tablet 75 mg Daily    dextrose 10% bolus 125 mL 125 mL PRN    dextrose 10% bolus 250  mL 250 mL PRN    doxazosin tablet 4 mg Q12H    famotidine tablet 20 mg Daily    glucagon (human recombinant) injection 1 mg PRN    glucose chewable tablet 16 g PRN    glucose chewable tablet 24 g PRN    heparin 25,000 units in dextrose 5% (100 units/ml) IV bolus from bag - ADDITIONAL PRN BOLUS - 30 units/kg (max bolus 4000 units) PRN    heparin 25,000 units in dextrose 5% (100 units/ml) IV bolus from bag - ADDITIONAL PRN BOLUS - 60 units/kg (max bolus 4000 units) PRN    heparin 25,000 units in dextrose 5% 250 mL (100 units/mL) infusion LOW INTENSITY nomogram - [facility] Continuous    hydrALAZINE 10 mg 1 tablet, hydrALAZINE 25 mg 1 tablet, isorsorbide dinitrate 20 mg 1 tablet combination TID    melatonin tablet 6 mg Nightly PRN    metoprolol succinate (TOPROL-XL) 24 hr tablet 25 mg Daily    naloxone 0.4 mg/mL injection 0.02 mg PRN    NIFEdipine 24 hr tablet 60 mg BID    nitroGLYCERIN SL tablet 0.4 mg Q5 Min PRN    ondansetron disintegrating tablet 8 mg Q8H PRN    predniSONE tablet 5 mg Daily    prochlorperazine injection Soln 5 mg Q6H PRN    sodium chloride 0.9% flush 10 mL Q12H PRN    tacrolimus capsule 3 mg BID       Objective:     Vital Signs (Most Recent):  Temp: 98.2 °F (36.8 °C) (10/16/23 2045)  Pulse: 62 (10/17/23 0306)  Resp: 20 (10/16/23 2357)  BP: 139/63 (10/16/23 2357)  SpO2: 97 % (10/17/23 0500) Vital Signs (24h Range):  Temp:  [98 °F (36.7 °C)-98.5 °F (36.9 °C)] 98.2 °F (36.8 °C)  Pulse:  [59-78] 62  Resp:  [14-44] 20  SpO2:  [96 %-100 %] 97 %  BP: (125-170)/(57-71) 139/63     Weight: 93.9 kg (207 lb 0.2 oz) (10/16/23 0400)  Body mass index is 27.31 kg/m².  Body surface area is 2.2 meters squared.    I/O last 3 completed shifts:  In: 250 [P.O.:250]  Out: 420 [Urine:420]    Physical Exam  Vitals reviewed.   Constitutional:       General: He is not in acute distress.     Appearance: Normal appearance. He is not ill-appearing or toxic-appearing.   HENT:      Head: Normocephalic and atraumatic.      Right Ear:  External ear normal.      Left Ear: External ear normal.      Nose: Nose normal.   Eyes:      General: No scleral icterus.     Conjunctiva/sclera: Conjunctivae normal.   Cardiovascular:      Rate and Rhythm: Normal rate and regular rhythm.      Pulses: Normal pulses.      Heart sounds: Normal heart sounds. No murmur heard.     No friction rub.   Pulmonary:      Effort: Pulmonary effort is normal. No respiratory distress.      Breath sounds: Normal breath sounds. No wheezing or rhonchi.   Abdominal:      General: Bowel sounds are normal. There is no distension.      Palpations: Abdomen is soft.      Tenderness: There is no abdominal tenderness. There is no guarding.   Musculoskeletal:         General: No swelling or deformity. Normal range of motion.      Cervical back: Normal range of motion and neck supple. No tenderness.      Right lower leg: No edema.      Left lower leg: No edema.   Skin:     General: Skin is warm and dry.      Coloration: Skin is not jaundiced.      Findings: No erythema or rash.   Neurological:      General: No focal deficit present.      Mental Status: He is alert and oriented to person, place, and time.      Motor: No weakness.   Psychiatric:         Mood and Affect: Mood normal.         Behavior: Behavior normal.         Assessment/Plan:   67 yr old AAM with hx of kidney transplant in 2005 (bcrof 2.8-3.2), COPD, CAD s/p CABG in 2006 with active smoking, AFIB on amiodarone/Eliquis, HTN who is admitted for shortness of breath as well as dyspnea on exertion, Palliative care consulted yesterday for goals of care discussion, he states he has had enough dialysis and does not want to pursue dialysis now, but may consider it in the future after we discussed the risks of contrast by cardiology    1) Community acquired pneumonia  Treated by primary team, reviewed medication interreactions    2) KATINA  on CKD G4A2 of kidney transplant  Most likely multifactorial, both cardiorenal. Natural progression of  CKD, and anemia that is symptomatic  - baseline creatinine 2.8 to 3.2, today 4.7 with eGFR 12.8-  - need accurate I/O so we can make thoughtful assessments and make safe clinical plans, thanks      3) Immunosuppression  - stopped taking Cellcept roughly a year ago per Dr. Diaz/Dr. Webb  - Tacrolimus goal between 4 and 6, continue 3 mg twice daily  - monitor for drug toxicity and interactions with other therapies daily    4) Hypertension  Not at goal of near 140/90 while in the hospital, continue current plan  - active CP that appears to be Angina, recommend cardiology manage for medical optimization, not sure he is a candidate for angiogram    5) Acid Base and Electrolytes  - NAGMA, mild sodium bicarbonate 650 mg twice daily, goal greater than 24    6) Anemia of CKD  - iron studies, last evaluated on 2/23, recommend IV iron if deficient  - Hgb today 7.5 transfused 1 UPRBC 10/16/23 consider EPO  - transfuse Hgb less than 7.0, consider higher threshold in the setting of CAD    7) CKD-MBD  Hyperphosphatemia, recommend dietary management at this time, consider phosphate binder if greater than 5.5  - last iPTH 9/23 was 32.9  - Vitamin D 1,25 10, D ,25 Hydroxy level 72 on 1/23    We will continue to follow. Please call if you have any questions.     Jose Warren DO  [facility]

## 2023-10-17 NOTE — ASSESSMENT & PLAN NOTE
Having chest pain during admission, notable episode on 10/16; troponin obtained: elevated from days prior, will trend. EKG significant for ST changes. Cardiology following    - ACS protocol initiated.   - Heparin gtt for 48 hours, with plans to resume Eliquis after once off the heparin gtt  - If patient is complaining of chest pain, inform cardiology  - ASA and Plavix loading dose initiated, followed by 81/75mg maintenance therapy  - Start metoprolol 25 mg  - Continue atorvastatin 10 mg

## 2023-10-17 NOTE — SUBJECTIVE & OBJECTIVE
Interval History:  Patient saw palliative medicine yesterday.  Goals of care discussion was initially will continue this discussion with palliative Care.  They attempted to discuss regarding code status however seems like he was not amenable during that time.  He denies any chest pains overnight.  No more anginal symptoms since yesterday morning.      Review of Systems   Constitutional: Negative for chills and fever.   HENT:  Negative for congestion.    Cardiovascular:  Negative for chest pain and dyspnea on exertion.   Respiratory:  Negative for cough and shortness of breath.    Musculoskeletal:  Negative for back pain.   Gastrointestinal:  Negative for abdominal pain, diarrhea, nausea and vomiting.   Neurological:  Negative for headaches.     Objective:     Vital Signs (Most Recent):  Temp: 98.1 °F (36.7 °C) (10/17/23 0814)  Pulse: (!) 58 (10/17/23 1151)  Resp: 18 (10/17/23 0814)  BP: (!) 148/67 (10/17/23 0850)  SpO2: 95 % (10/17/23 1300) Vital Signs (24h Range):  Temp:  [98 °F (36.7 °C)-98.5 °F (36.9 °C)] 98.1 °F (36.7 °C)  Pulse:  [58-67] 58  Resp:  [17-44] 18  SpO2:  [95 %-100 %] 95 %  BP: (125-170)/(57-71) 148/67     Weight: 93.9 kg (207 lb 0.2 oz)  Body mass index is 27.31 kg/m².     SpO2: 95 %         Intake/Output Summary (Last 24 hours) at 10/17/2023 1321  Last data filed at 10/16/2023 1843  Gross per 24 hour   Intake 250 ml   Output --   Net 250 ml       Lines/Drains/Airways       Peripheral Intravenous Line  Duration                  Peripheral IV - Single Lumen 10/16/23 2045 18 G Anterior;Right;Medial;Proximal Forearm <1 day                       Physical Exam  Constitutional:       General: He is not in acute distress.     Appearance: He is not ill-appearing.   HENT:      Nose: Nose normal.      Mouth/Throat:      Mouth: Mucous membranes are moist.   Eyes:      Pupils: Pupils are equal, round, and reactive to light.   Neck:      Comments: No JVD appreciated   Cardiovascular:      Rate and Rhythm:  Normal rate and regular rhythm.      Pulses: Normal pulses.      Heart sounds: No murmur heard.  Pulmonary:      Effort: Pulmonary effort is normal. No respiratory distress.      Breath sounds: No wheezing or rales.   Abdominal:      General: Abdomen is flat. There is no distension.      Palpations: Abdomen is soft.      Tenderness: There is no right CVA tenderness or left CVA tenderness.   Musculoskeletal:         General: No swelling.      Cervical back: Normal range of motion and neck supple. No tenderness.      Right lower leg: Edema (1+ pitting) present.      Left lower leg: Edema (1+ pitting) present.   Skin:     General: Skin is warm.      Coloration: Skin is not pale.      Findings: No rash.   Neurological:      General: No focal deficit present.      Mental Status: He is alert and oriented to person, place, and time.      Motor: No weakness.            Significant Labs: CMP   Recent Labs   Lab 10/16/23  0425 10/16/23  1112 10/17/23  0436    141 141   K 3.8 3.4* 3.6    106 108   CO2 21* 22* 22*   GLU 82 96 84   BUN 36* 34* 35*   CREATININE 4.4* 4.7* 4.7*   CALCIUM 7.4* 7.5* 7.1*   PROT 5.4*  --  5.6*   ALBUMIN 2.4*  --  2.4*   BILITOT 0.4  --  0.3   ALKPHOS 52*  --  58   AST 18  --  16   ALT 16  --  14   ANIONGAP 11 13 11    and CBC   Recent Labs   Lab 10/16/23  0425 10/16/23  2254 10/17/23  0436   WBC 7.46 7.97 7.71   HGB 6.9* 7.3* 7.9*   HCT 21.8* 23.4* 25.0*    139* 152       Significant Imaging: Echocardiogram: Transthoracic echo (TTE) complete (Cupid Only):   Results for orders placed or performed during the hospital encounter of 10/13/23   Echo   Result Value Ref Range    Ascending aorta 2.91 cm    STJ 2.78 cm    AV mean gradient 9 mmHg    Ao peak uhan 2.12 m/s    Ao VTI 49.26 cm    IVRT 114.18 msec    IVS 1.02 0.6 - 1.1 cm    LA size 4.49 cm    Left Atrium Major Axis 6.82 cm    Left Atrium Minor Axis 6.21 cm    LVIDd 5.74 3.5 - 6.0 cm    LVIDs 4.31 (A) 2.1 - 4.0 cm    LVOT diameter  2.2 cm    LVOT peak VTI 24.00 cm    Posterior Wall 0.84 0.6 - 1.1 cm    MV Peak A Aris 0.74 m/s    E wave deceleration time 217.91 msec    MV Peak E Aris 0.96 m/s    RA Major Axis 5.75 cm    RA Width 4.01 cm    RVDD 3.18 cm    Sinus 3.37 cm    TAPSE 2.09 cm    TR Max Aris 3.74 m/s    LA WIDTH 5.14 cm    LV Diastolic Volume 162.44 mL    LV Systolic Volume 83.57 mL    LVOT peak aris 1.15 m/s    TDI LATERAL 0.09 m/s    TDI SEPTAL 0.07 m/s    LA volume (mod) 117.00 cm3    LV LATERAL E/E' RATIO 10.67 m/s    LV SEPTAL E/E' RATIO 13.71 m/s    FS 25 %    LA volume 127.52 cm3    LV mass 208.48 g    ZLVIDD -2.39     ZLVIDS -0.22     Left Ventricle Relative Wall Thickness 0.29 cm    AV valve area 1.85 cm²    AV Velocity Ratio 0.54     AV index (prosthetic) 0.49     E/A ratio 1.30     Mean e' 0.08 m/s    LVOT area 3.8 cm2    LVOT stroke volume 91.19 cm3    AV peak gradient 18 mmHg    E/E' ratio 12.00 m/s    LV Systolic Volume Index 38.3 mL/m2    LV Diastolic Volume Index 74.51 mL/m2    LA Volume Index 58.5 mL/m2    LV Mass Index 96 g/m2    Triscuspid Valve Regurgitation Peak Gradient 56 mmHg    LA Volume Index (Mod) 53.7 mL/m2    VERONICA by Velocity Ratio 2.06 cm²    BSA 2.2 m2    TV resting pulmonary artery pressure 59 mmHg    RV TB RVSP 7 mmHg    Est. RA pres 3 mmHg    Narrative      Left Ventricle: The left ventricle is normal in size. Ventricular mass   is normal. Normal wall thickness. Normal wall motion. There is normal   systolic function with a visually estimated ejection fraction of 60 - 65%.   Grade II diastolic dysfunction.    Left Atrium: Left atrium is severely dilated.    Right Ventricle: Normal right ventricular cavity size. Wall thickness   is normal. Right ventricle wall motion  is normal. Systolic function is   normal.    Right Atrium: Right atrium is dilated.    Aortic Valve: Moderately calcified cusps. There is moderate annular   calcification present. Mildly restricted motion. There is mild stenosis.   Aortic valve  area by VTI is 1.85 cm². Aortic valve peak velocity is 2.12   m/s. Mean gradient is 9 mmHg. The dimensionless index is 0.49. There is   mild to moderate aortic regurgitation.    Pulmonary Artery: The estimated pulmonary artery systolic pressure is   59 mmHg.    IVC/SVC: Normal venous pressure at 3 mmHg.

## 2023-10-18 PROBLEM — Z71.89 ADVANCED CARE PLANNING/COUNSELING DISCUSSION: Status: ACTIVE | Noted: 2023-10-18

## 2023-10-18 LAB
ACID FAST MOD KINY STN SPEC: NORMAL
ACID FAST MOD KINY STN SPEC: NORMAL
ALBUMIN SERPL BCP-MCNC: 2.5 G/DL (ref 3.5–5.2)
ALP SERPL-CCNC: 60 U/L (ref 55–135)
ALT SERPL W/O P-5'-P-CCNC: 13 U/L (ref 10–44)
ANION GAP SERPL CALC-SCNC: 11 MMOL/L (ref 8–16)
APTT PPP: 44.6 SEC (ref 21–32)
AST SERPL-CCNC: 15 U/L (ref 10–40)
BACTERIA BLD CULT: NORMAL
BACTERIA BLD CULT: NORMAL
BASOPHILS # BLD AUTO: 0.01 K/UL (ref 0–0.2)
BASOPHILS NFR BLD: 0.1 % (ref 0–1.9)
BILIRUB SERPL-MCNC: 0.3 MG/DL (ref 0.1–1)
BLD PROD TYP BPU: NORMAL
BLOOD UNIT EXPIRATION DATE: NORMAL
BLOOD UNIT TYPE CODE: 1700
BLOOD UNIT TYPE: NORMAL
BUN SERPL-MCNC: 39 MG/DL (ref 8–23)
CALCIUM SERPL-MCNC: 7 MG/DL (ref 8.7–10.5)
CHLORIDE SERPL-SCNC: 105 MMOL/L (ref 95–110)
CO2 SERPL-SCNC: 22 MMOL/L (ref 23–29)
CODING SYSTEM: NORMAL
CREAT SERPL-MCNC: 5.5 MG/DL (ref 0.5–1.4)
CROSSMATCH INTERPRETATION: NORMAL
DIFFERENTIAL METHOD: ABNORMAL
DISPENSE STATUS: NORMAL
EOSINOPHIL # BLD AUTO: 0.5 K/UL (ref 0–0.5)
EOSINOPHIL NFR BLD: 6.9 % (ref 0–8)
ERYTHROCYTE [DISTWIDTH] IN BLOOD BY AUTOMATED COUNT: 17.8 % (ref 11.5–14.5)
EST. GFR  (NO RACE VARIABLE): 10.6 ML/MIN/1.73 M^2
FERRITIN SERPL-MCNC: 701 NG/ML (ref 20–300)
GLUCOSE SERPL-MCNC: 87 MG/DL (ref 70–110)
HCT VFR BLD AUTO: 23.3 % (ref 40–54)
HGB BLD-MCNC: 7.4 G/DL (ref 14–18)
IMM GRANULOCYTES # BLD AUTO: 0.03 K/UL (ref 0–0.04)
IMM GRANULOCYTES NFR BLD AUTO: 0.4 % (ref 0–0.5)
LYMPHOCYTES # BLD AUTO: 1.9 K/UL (ref 1–4.8)
LYMPHOCYTES NFR BLD: 25.3 % (ref 18–48)
MAGNESIUM SERPL-MCNC: 2.7 MG/DL (ref 1.6–2.6)
MCH RBC QN AUTO: 25 PG (ref 27–31)
MCHC RBC AUTO-ENTMCNC: 31.8 G/DL (ref 32–36)
MCV RBC AUTO: 79 FL (ref 82–98)
MONOCYTES # BLD AUTO: 0.7 K/UL (ref 0.3–1)
MONOCYTES NFR BLD: 9.2 % (ref 4–15)
MYCOBACTERIUM SPEC QL CULT: NORMAL
MYCOBACTERIUM SPEC QL CULT: NORMAL
NEUTROPHILS # BLD AUTO: 4.4 K/UL (ref 1.8–7.7)
NEUTROPHILS NFR BLD: 58.1 % (ref 38–73)
NRBC BLD-RTO: 0 /100 WBC
NUM UNITS TRANS PACKED RBC: NORMAL
PHOSPHATE SERPL-MCNC: 4.1 MG/DL (ref 2.7–4.5)
PLATELET # BLD AUTO: 146 K/UL (ref 150–450)
PMV BLD AUTO: ABNORMAL FL (ref 9.2–12.9)
POTASSIUM SERPL-SCNC: 4.1 MMOL/L (ref 3.5–5.1)
PROT SERPL-MCNC: 5.7 G/DL (ref 6–8.4)
RBC # BLD AUTO: 2.96 M/UL (ref 4.6–6.2)
SODIUM SERPL-SCNC: 138 MMOL/L (ref 136–145)
TACROLIMUS BLD-MCNC: 3.4 NG/ML (ref 5–15)
WBC # BLD AUTO: 7.64 K/UL (ref 3.9–12.7)

## 2023-10-18 PROCEDURE — 84100 ASSAY OF PHOSPHORUS: CPT

## 2023-10-18 PROCEDURE — 25000003 PHARM REV CODE 250

## 2023-10-18 PROCEDURE — 36430 TRANSFUSION BLD/BLD COMPNT: CPT

## 2023-10-18 PROCEDURE — 93005 ELECTROCARDIOGRAM TRACING: CPT

## 2023-10-18 PROCEDURE — 85025 COMPLETE CBC W/AUTO DIFF WBC: CPT

## 2023-10-18 PROCEDURE — 63600175 PHARM REV CODE 636 W HCPCS: Mod: EC,JG

## 2023-10-18 PROCEDURE — 25000003 PHARM REV CODE 250: Performed by: STUDENT IN AN ORGANIZED HEALTH CARE EDUCATION/TRAINING PROGRAM

## 2023-10-18 PROCEDURE — 99233 SBSQ HOSP IP/OBS HIGH 50: CPT | Mod: ,,, | Performed by: INTERNAL MEDICINE

## 2023-10-18 PROCEDURE — 20600001 HC STEP DOWN PRIVATE ROOM

## 2023-10-18 PROCEDURE — 85730 THROMBOPLASTIN TIME PARTIAL: CPT

## 2023-10-18 PROCEDURE — 80053 COMPREHEN METABOLIC PANEL: CPT

## 2023-10-18 PROCEDURE — P9016 RBC LEUKOCYTES REDUCED: HCPCS

## 2023-10-18 PROCEDURE — 82728 ASSAY OF FERRITIN: CPT

## 2023-10-18 PROCEDURE — 99233 SBSQ HOSP IP/OBS HIGH 50: CPT | Mod: GC,,, | Performed by: STUDENT IN AN ORGANIZED HEALTH CARE EDUCATION/TRAINING PROGRAM

## 2023-10-18 PROCEDURE — 36415 COLL VENOUS BLD VENIPUNCTURE: CPT

## 2023-10-18 PROCEDURE — 99233 PR SUBSEQUENT HOSPITAL CARE,LEVL III: ICD-10-PCS | Mod: ,,, | Performed by: CLINICAL NURSE SPECIALIST

## 2023-10-18 PROCEDURE — 36415 COLL VENOUS BLD VENIPUNCTURE: CPT | Performed by: INTERNAL MEDICINE

## 2023-10-18 PROCEDURE — 99233 SBSQ HOSP IP/OBS HIGH 50: CPT | Mod: ,,, | Performed by: CLINICAL NURSE SPECIALIST

## 2023-10-18 PROCEDURE — 99232 SBSQ HOSP IP/OBS MODERATE 35: CPT | Mod: 25,GC,, | Performed by: INTERNAL MEDICINE

## 2023-10-18 PROCEDURE — 93010 EKG 12-LEAD: ICD-10-PCS | Mod: ,,, | Performed by: INTERNAL MEDICINE

## 2023-10-18 PROCEDURE — 83735 ASSAY OF MAGNESIUM: CPT

## 2023-10-18 PROCEDURE — 99232 PR SUBSEQUENT HOSPITAL CARE,LEVL II: ICD-10-PCS | Mod: 25,GC,, | Performed by: INTERNAL MEDICINE

## 2023-10-18 PROCEDURE — 80197 ASSAY OF TACROLIMUS: CPT | Performed by: INTERNAL MEDICINE

## 2023-10-18 PROCEDURE — 25000003 PHARM REV CODE 250: Performed by: INTERNAL MEDICINE

## 2023-10-18 PROCEDURE — 99233 PR SUBSEQUENT HOSPITAL CARE,LEVL III: ICD-10-PCS | Mod: GC,,, | Performed by: STUDENT IN AN ORGANIZED HEALTH CARE EDUCATION/TRAINING PROGRAM

## 2023-10-18 PROCEDURE — 99233 PR SUBSEQUENT HOSPITAL CARE,LEVL III: ICD-10-PCS | Mod: ,,, | Performed by: INTERNAL MEDICINE

## 2023-10-18 PROCEDURE — 63600175 PHARM REV CODE 636 W HCPCS: Performed by: INTERNAL MEDICINE

## 2023-10-18 PROCEDURE — 93010 ELECTROCARDIOGRAM REPORT: CPT | Mod: ,,, | Performed by: INTERNAL MEDICINE

## 2023-10-18 PROCEDURE — 86920 COMPATIBILITY TEST SPIN: CPT

## 2023-10-18 RX ORDER — METHOCARBAMOL 500 MG/1
500 TABLET, FILM COATED ORAL 4 TIMES DAILY PRN
Status: DISCONTINUED | OUTPATIENT
Start: 2023-10-18 | End: 2023-10-20 | Stop reason: HOSPADM

## 2023-10-18 RX ORDER — HYDRALAZINE HYDROCHLORIDE 25 MG/1
25 TABLET, FILM COATED ORAL EVERY 8 HOURS
Status: DISCONTINUED | OUTPATIENT
Start: 2023-10-18 | End: 2023-10-20 | Stop reason: HOSPADM

## 2023-10-18 RX ORDER — PANTOPRAZOLE SODIUM 40 MG/1
40 TABLET, DELAYED RELEASE ORAL DAILY
Status: DISCONTINUED | OUTPATIENT
Start: 2023-10-18 | End: 2023-10-20 | Stop reason: HOSPADM

## 2023-10-18 RX ORDER — HYDROCODONE BITARTRATE AND ACETAMINOPHEN 500; 5 MG/1; MG/1
TABLET ORAL
Status: DISCONTINUED | OUTPATIENT
Start: 2023-10-18 | End: 2023-10-20 | Stop reason: HOSPADM

## 2023-10-18 RX ORDER — ISOSORBIDE MONONITRATE 60 MG/1
60 TABLET, EXTENDED RELEASE ORAL DAILY
Status: DISCONTINUED | OUTPATIENT
Start: 2023-10-19 | End: 2023-10-20 | Stop reason: HOSPADM

## 2023-10-18 RX ORDER — LANOLIN ALCOHOL/MO/W.PET/CERES
1 CREAM (GRAM) TOPICAL DAILY
Status: DISCONTINUED | OUTPATIENT
Start: 2023-10-18 | End: 2023-10-20 | Stop reason: HOSPADM

## 2023-10-18 RX ORDER — ATORVASTATIN CALCIUM 40 MG/1
40 TABLET, FILM COATED ORAL DAILY
Status: DISCONTINUED | OUTPATIENT
Start: 2023-10-19 | End: 2023-10-20 | Stop reason: HOSPADM

## 2023-10-18 RX ORDER — CALCIUM CARBONATE 200(500)MG
1000 TABLET,CHEWABLE ORAL DAILY
Status: CANCELLED | OUTPATIENT
Start: 2023-10-18 | End: 2023-10-19

## 2023-10-18 RX ORDER — CALCIUM CARBONATE 200(500)MG
1000 TABLET,CHEWABLE ORAL ONCE
Status: DISCONTINUED | OUTPATIENT
Start: 2023-10-18 | End: 2023-10-20

## 2023-10-18 RX ADMIN — PREDNISONE 5 MG: 5 TABLET ORAL at 08:10

## 2023-10-18 RX ADMIN — DOXAZOSIN 4 MG: 2 TABLET ORAL at 10:10

## 2023-10-18 RX ADMIN — TACROLIMUS 3 MG: 1 CAPSULE ORAL at 08:10

## 2023-10-18 RX ADMIN — METOPROLOL SUCCINATE 25 MG: 25 TABLET, EXTENDED RELEASE ORAL at 08:10

## 2023-10-18 RX ADMIN — NASAL 1 SPRAY: 6.5 SPRAY NASAL at 09:10

## 2023-10-18 RX ADMIN — FAMOTIDINE 20 MG: 20 TABLET ORAL at 08:10

## 2023-10-18 RX ADMIN — CLOPIDOGREL BISULFATE 75 MG: 75 TABLET ORAL at 08:10

## 2023-10-18 RX ADMIN — FERROUS SULFATE TAB EC 325 MG (65 MG FE EQUIVALENT) 1 EACH: 325 (65 FE) TABLET DELAYED RESPONSE at 01:10

## 2023-10-18 RX ADMIN — SODIUM BICARBONATE 650 MG TABLET 650 MG: at 08:10

## 2023-10-18 RX ADMIN — ISOSORBIDE MONONITRATE 90 MG: 60 TABLET, EXTENDED RELEASE ORAL at 08:10

## 2023-10-18 RX ADMIN — ERYTHROPOIETIN 4700 UNITS: 10000 INJECTION, SOLUTION INTRAVENOUS; SUBCUTANEOUS at 01:10

## 2023-10-18 RX ADMIN — ASPIRIN 81 MG: 81 TABLET, COATED ORAL at 08:10

## 2023-10-18 RX ADMIN — METHOCARBAMOL 500 MG: 500 TABLET ORAL at 10:10

## 2023-10-18 RX ADMIN — METHOCARBAMOL 500 MG: 500 TABLET ORAL at 02:10

## 2023-10-18 RX ADMIN — APIXABAN 5 MG: 5 TABLET, FILM COATED ORAL at 05:10

## 2023-10-18 RX ADMIN — NIFEDIPINE 60 MG: 30 TABLET, FILM COATED, EXTENDED RELEASE ORAL at 08:10

## 2023-10-18 RX ADMIN — NASAL 1 SPRAY: 6.5 SPRAY NASAL at 02:10

## 2023-10-18 RX ADMIN — SODIUM BICARBONATE 650 MG TABLET 650 MG: at 10:10

## 2023-10-18 RX ADMIN — HYDRALAZINE HYDROCHLORIDE 25 MG: 25 TABLET, FILM COATED ORAL at 02:10

## 2023-10-18 RX ADMIN — DOXAZOSIN 4 MG: 2 TABLET ORAL at 08:10

## 2023-10-18 RX ADMIN — TACROLIMUS 3 MG: 1 CAPSULE ORAL at 05:10

## 2023-10-18 RX ADMIN — HYDRALAZINE HYDROCHLORIDE 50 MG: 50 TABLET ORAL at 05:10

## 2023-10-18 RX ADMIN — NASAL 1 SPRAY: 6.5 SPRAY NASAL at 07:10

## 2023-10-18 RX ADMIN — NASAL 1 SPRAY: 6.5 SPRAY NASAL at 05:10

## 2023-10-18 RX ADMIN — PANTOPRAZOLE SODIUM 40 MG: 40 TABLET, DELAYED RELEASE ORAL at 10:10

## 2023-10-18 RX ADMIN — ATORVASTATIN CALCIUM 10 MG: 10 TABLET, FILM COATED ORAL at 08:10

## 2023-10-18 RX ADMIN — HYDRALAZINE HYDROCHLORIDE 25 MG: 25 TABLET, FILM COATED ORAL at 10:10

## 2023-10-18 RX ADMIN — AMIODARONE HYDROCHLORIDE 200 MG: 200 TABLET ORAL at 08:10

## 2023-10-18 NOTE — ASSESSMENT & PLAN NOTE
Palliative care consulted for goals of care and advanced care planning as discussed with primary team resident, for Mr. Ibrahima Phelps. Mr. Phelps known to pal med from previous admissions.  He is awake alert and oriented x3, reports no pain or discomforts.  Admitted to hospital medicine with chief complaint of unstable angina.       Advance Care Planning   - No ACP documents received  - Mr. Phelps appears able to make medical decisions   - next of kin is wife Patti Phelps [phone]   - full code per primary team      Goals of Care  - pal med met with patient and wife at bedside   - pal med introduced.  Patient is amenable to pal med.  -  states understanding his current clinical condition and has stopped taking his Cellcept at least a year ago.  He states knowing he will not get another transplant.   - understands he needs a heart cath to evaluate his heart function and is aware of risks associated with contrast on his kidney function. Today he states he will not have the heart cath.  He has made decision to proceed with medical management  - He is hoping he will get well enough to go home and return to his baseline.   - state this is God's plan and he freely accepts this.   - briefly discussed resuscitation status and he is not amenable to conversation today.    - emotional support provided     Symptom Management   - no c/o pain,nausea  - mild shortness of breath occasionally on exertion   - room air oxygen saturations 97 - 100%    Unstable angina pectoris/ CKD (chronic kidney disease), stage IV  Managed per primary team and specialty consultants   - no c/o chest pain at this time   - heparin drip in progress for 48 hrs  - Cr. Today was 4.1, baseline around 3.7-4  - Pal med following for goals of care and advanced care planning - see above       PlanRecommendations   - continue current plan of care   - amenable to pal med follow up - will facilitate referral   - emotional support provided      Dr. Judge  primary team attending and resident notified of the above.       Thank you for consult and opportunity to participate in Mr. Phelps's care      Thank you for consult and opportunity

## 2023-10-18 NOTE — PROGRESS NOTES
"[facility] - Telemetry [facility] Medicine  Progress Note    Patient Name: Ibrahima Phelps  MRN: [id number]  Patient Class: IP- Inpatient   Admission Date: 10/13/2023  Length of Stay: 5 days  Attending Physician: Ricky Judge MD  Primary Care Provider: Anatoliy Colindres MD        Subjective:     Principal Problem:Unstable angina pectoris        HPI:  66 yo M active smoker w/ COPD, CAD s/p CABG x1 in 2006, paroxysmal Afib s/p cardioversion 3/2022 (on Eliquis, Metoprolol, and Amiodarone), HFpEF (EF 65%; not currently taking diuretics), hiatal hernia with reflux, HTN on Hydralazine, CKD4 s/p kidney transplants in 1992 and 2005 on Tacrolimus and Prednisone, presented to the ED with his wife complaining about worsening shortness of breath/DAUGHERTY; infrequent, nonproductive cough; and severe, sharp L posterior heel pain that started a few days ago. Per the patient, he woke up last night "gasping for air," short of breath, and with substernal chest pain radiating to his back. The chest pain resolved with Omeprazole; denies chest pain at this time. The shortness of breath and infrequent, nonproductive cough typically occur at rest and are worse with walking. The severe, sharp L posterior heel pain is worse with movement and walking, and has not responded to leg elevation. Of note, he has a history of tenosynovitis. He noted significant, watery diarrhea associated with stomach pain all day yesterday that resolved with Peptobismol and water; denies diarrhea this morning. Denies fevers, chills, nausea, vomiting, constipation, dysuria, hematuria, palpitations, headache, or vision changes.     His presenting vital signs were a temperature of 98.3, pulse rate of 57-84 bpm, resp rate of 20-28, blood pressure ranging from 156-169/69-74, MAP of 104-107, and SpO2 % on RA. Troponin (0.081), Magnesium (nml), CBC (Hgb 8.2, platelets 127), CMP (CO2 19, BUN 33, Cr 4.1), and BNP (1,168) were ordered. CXR showed RLL consolidation. XR L " ankle showed calcaneal enthesopathy. CTAP showed trace bilateral pleural effusions, bibasilar dependent pulmonary opacities, new prominence/thickening of the L iliac fossa transplant kidney's ureter. Started on CTX, AZT, and Lasix      Overview/Hospital Course:  Patient admitted for further management. Patient continued having chest pain, cardiology consulted. Patient found to have elevated PA pressures concerning for pHTN which was likely contributing to shortness of breath. KTM consulted and recommended stopping lasix. Pt continuing to have chest pain in hospital, Cardiology following, initiated ACS protocol with plans to do a PET stress to assess for Ischemia, though patient not presently a candidate for LHC given CKD5. Pt not amenable to dialysis. Pt transfused 1 unit of pRBCs during admission for Hgb <7. Palliative consulted for GOC      Interval History: patient continued having chest pain. Cr continuing to rise    Review of Systems   Constitutional:  Negative for chills, fatigue and fever.   HENT:  Negative for ear pain and sinus pain.    Eyes:  Negative for photophobia, pain and visual disturbance.   Respiratory:  Negative for chest tightness, shortness of breath and wheezing.    Cardiovascular:  Positive for chest pain. Negative for leg swelling.   Gastrointestinal:  Negative for abdominal pain, blood in stool, constipation, diarrhea, nausea and vomiting.   Endocrine: Negative for polydipsia and polyuria.   Genitourinary:  Negative for difficulty urinating, dysuria, flank pain, frequency, penile pain and testicular pain.   Musculoskeletal:  Negative for back pain, neck pain and neck stiffness.   Skin:  Negative for color change and rash.   Neurological:  Negative for dizziness, syncope, weakness, numbness and headaches.   Psychiatric/Behavioral:  Negative for confusion and sleep disturbance.      Objective:     Vital Signs (Most Recent):  Temp: 97.4 °F (36.3 °C) (10/18/23 1534)  Pulse: 62 (10/18/23  1534)  Resp: 18 (10/18/23 1534)  BP: (!) 105/50 (10/18/23 1534)  SpO2: 99 % (10/18/23 1700) Vital Signs (24h Range):  Temp:  [97.4 °F (36.3 °C)-98.3 °F (36.8 °C)] 97.4 °F (36.3 °C)  Pulse:  [56-63] 62  Resp:  [18-32] 18  SpO2:  [96 %-100 %] 99 %  BP: (105-124)/(50-58) 105/50     Weight: 93.5 kg (206 lb 2.1 oz)  Body mass index is 27.2 kg/m².    Intake/Output Summary (Last 24 hours) at 10/18/2023 1736  Last data filed at 10/17/2023 2327  Gross per 24 hour   Intake --   Output 400 ml   Net -400 ml         Physical Exam  Constitutional:       General: He is not in acute distress.     Appearance: He is not ill-appearing.   HENT:      Head: Normocephalic and atraumatic.      Right Ear: External ear normal.      Left Ear: External ear normal.      Nose: Nose normal. No congestion or rhinorrhea.      Mouth/Throat:      Mouth: Mucous membranes are dry.      Pharynx: Oropharynx is clear. No oropharyngeal exudate or posterior oropharyngeal erythema.   Eyes:      General: No scleral icterus.        Right eye: No discharge.         Left eye: No discharge.      Extraocular Movements: Extraocular movements intact.      Conjunctiva/sclera: Conjunctivae normal.   Cardiovascular:      Rate and Rhythm: Normal rate and regular rhythm.      Pulses: Normal pulses.      Heart sounds: Normal heart sounds. No murmur heard.     No gallop.   Pulmonary:      Effort: No respiratory distress.      Breath sounds: Normal breath sounds. No stridor. No wheezing or rales.   Chest:      Chest wall: No tenderness.   Abdominal:      General: Abdomen is flat. Bowel sounds are normal. There is no distension.      Palpations: Abdomen is soft. There is no mass.      Tenderness: There is no abdominal tenderness. There is no guarding or rebound.      Hernia: No hernia is present.   Musculoskeletal:         General: No swelling, tenderness or deformity. Normal range of motion.      Cervical back: Normal range of motion. No rigidity or tenderness.      Right  lower leg: Edema present.      Left lower leg: Edema present.   Skin:     General: Skin is warm and dry.      Capillary Refill: Capillary refill takes less than 2 seconds.      Coloration: Skin is not jaundiced.      Findings: No bruising, erythema or rash.   Neurological:      General: No focal deficit present.      Mental Status: He is alert and oriented to person, place, and time.      Cranial Nerves: No cranial nerve deficit.      Sensory: No sensory deficit.      Motor: No weakness.   Psychiatric:         Mood and Affect: Mood normal.         Behavior: Behavior normal.         Thought Content: Thought content normal.         Judgment: Judgment normal.             Significant Labs: All pertinent labs within the past 24 hours have been reviewed.    Significant Imaging: I have reviewed all pertinent imaging results/findings within the past 24 hours.      Assessment/Plan:      * Unstable angina pectoris  Having chest pain during admission, notable episode on 10/16; troponin obtained: elevated from days prior, will trend. EKG significant for ST changes. Cardiology following    - ACS protocol initiated.   - Heparin gtt for 48 hours, with plans to resume Eliquis after once off the heparin gtt  - If patient is complaining of chest pain, inform cardiology  - ASA and Plavix loading dose initiated, followed by 81/75mg maintenance therapy  - Start metoprolol 25 mg  - Continue atorvastatin 10 mg      Consolidation lung  Patchy consolidation observed on CXR in ED. CT was largely unremarkable. Patient has worsening renal function.     - d/c antibiotics      CKD (chronic kidney disease), stage IV  Cr. Today was 5.5, baseline around 3.7-4. Managed by nephrology outpatient    - KTM consulted, appreciate recommendations  - avoid nephrotoxic drugs and renally dose medications  - strict I/Os      Anemia in stage 4 chronic kidney disease  Hgb 8.2 on admit, around baseline    Lab Results   Component Value Date    IRON 54 02/10/2023  "   TIBC 142 (L) 02/10/2023    FERRITIN 1,911 (H) 02/20/2023     Lab Results   Component Value Date    FOLATE 8.8 02/28/2023     Lab Results   Component Value Date    MADUXDAD00 278 06/20/2023     No results found for: "RETICCTPCT"    Likely secondary to CKD    Plan:   -   Lab Results   Component Value Date    HGB 6.9 (L) 10/16/2023     - Transfusion of 1u pRBCs on 10/16    Acute on chronic heart failure with preserved ejection fraction  Patient admitted with shortness of breath, edema, PND, orthopnea, consistent with acute on chronic diastolic CHF exacerbation.  Last TTE in February of 2023; EF 65% with Grade II Diastolic Dysfunction    BNP: BNP  Recent Labs   Lab 10/13/23  0836   BNP 1,168*     CXR: No significant pleural fluid, evidence of patchy consolidation    Troponin:   Recent Labs   Lab 10/16/23  1112   TROPONINI 0.084*       Home Meds:  Prescribed Lasix 40mg though not taking, states he took 20mg every other day over past week 2/2 swelling    Plan:  - hold lasix per KTM (in setting of ATN)  - Transition BilDil to Imdur  - Metoprolol 25, Nifedipine 60, and Hydral 50 TID  - Strict ins and outs           Immunodeficiency due to treatment with immunosuppressive medication  See H/o Kidney transplant    Palliative care encounter  Palliative consulted on 10/16, see note  - not amenable to discussing code status on initial encounter      Chronic obstructive pulmonary disease  Pt complaining of cough and shortness of breath, duration 3 days  Saturating appropriately on room air, no wheezing or abnormal breath sounds on auscultation     - holding home duonebs      Paroxysmal atrial fibrillation  Patient with Long standing persistent (>12 months) atrial fibrillation which is controlled currently with Beta Blocker and Amiodarone. Patient is currently in sinus rhythm.[id number] Score: 2. . Anticoagulation indicated. Anticoagulation done with apixaban.  S/p cardioversion in March of 2022    - Continue amio, metoprolol, " and apixaban  - monitor on telemetry    H/O kidney transplant  Kidney transplant in 1996 and 2003  Stopped taking Cellcept roughly a year ago per Dr. Diaz/Dr. Webb  Cr elevated to 4.1 on admission, around patients baseline     - Tacro level low; Tacrolimus goal between 4 and 6, continue 3 mg twice itzel  - Continue prednisone  - KTM consulted   - monitor for drug toxicity and interactions with other therapies daily    Long term (current) use of anticoagulants [V58.61]  Hold apixaban with plans to start heparin infusion      Mixed hyperlipidemia  Chronic and stable    - continue statin      CAD (coronary artery disease)  CABG in 2005    - Continue ASA and Statin  - Slight troponin elevation on admission, will trend    VTE Risk Mitigation (From admission, onward)         Ordered     apixaban tablet 5 mg  2 times daily         10/18/23 1623                Discharge Planning   RAMU: 10/21/2023     Code Status: Full Code   Is the patient medically ready for discharge?:     Reason for patient still in hospital (select all that apply): Consult recommendations  Discharge Plan A: Home with family                  Randal Beaver MD  Department of Hospital Medicine   [facility] - Telemetry Stepdown

## 2023-10-18 NOTE — PROGRESS NOTES
[facility] - Telemetry Stepdown  Transplant Nephrology  Progress Note    Patient Name: Ibrahima Phelps  MRN: [id number]  Admission Date: 10/13/2023  Hospital Length of Stay: 5 days  Attending Provider: Ricky Judge MD   Primary Care Physician: Anatoliy Colindres MD  Principal Problem:Unstable angina pectoris    Consults  Subjective:     Interval History: 67 yr old AAM with hx of kidney transplant in 4/12/2005 (bcr of 2.8-3.2), COPD, CAD s/p CABG in 2006 with active smoking, AFIB on amiodarone/Eliquis, HTN who is admitted for shortness of breath as well as dyspnea on exertion. Patient denies any fever but does admit to some congestion. Positive for cough but negative for nausea, vomiting, abdominal pain. Denies any diarrhea or swelling of his legs. Of note, patient admits to chest pain (present earlier this morning as well).    On arrival patient noted to be afebrile, mildly hypertensive with 100% on RA. Labs without any leukocytosis, creatinine of 4.1, BNP of 1168 with CXR showing RLL consolidation with CT chest findings compatible with pulmonary edema, severe coronary disease. Patient started on lasix IV with net negative 900 L.     Overnight: Patient reports feeling better today, denies headaches and CP that he had 2 days ago, heparin ending today    Review of patient's allergies indicates:   Allergen Reactions    Ace inhibitors Swelling    Verapamil Other (See Comments)     Other reaction(s): Unknown    Povidone-iodine Itching     Current Facility-Administered Medications   Medication Frequency    0.9%  NaCl infusion (for blood administration) Q24H PRN    0.9%  NaCl infusion (for blood administration) Q24H PRN    acetaminophen tablet 650 mg Q6H PRN    aluminum & magnesium hydroxide-simethicone 400-400-40 mg/5 mL suspension 30 mL Q6H    amiodarone tablet 200 mg Daily    aspirin EC tablet 81 mg Daily    atorvastatin tablet 10 mg Daily    clopidogreL tablet 75 mg Daily    dextrose 10% bolus 125 mL 125 mL PRN    dextrose  10% bolus 250 mL 250 mL PRN    doxazosin tablet 4 mg Q12H    famotidine tablet 20 mg Daily    glucagon (human recombinant) injection 1 mg PRN    glucose chewable tablet 16 g PRN    glucose chewable tablet 24 g PRN    heparin 25,000 units in dextrose 5% (100 units/ml) IV bolus from bag - ADDITIONAL PRN BOLUS - 30 units/kg (max bolus 4000 units) PRN    heparin 25,000 units in dextrose 5% (100 units/ml) IV bolus from bag - ADDITIONAL PRN BOLUS - 60 units/kg (max bolus 4000 units) PRN    heparin 25,000 units in dextrose 5% 250 mL (100 units/mL) infusion LOW INTENSITY nomogram - [facility] Continuous    hydrALAZINE tablet 50 mg Q8H    ipratropium 42 mcg (0.06 %) nasal spray 2 spray QID PRN    isosorbide mononitrate 24 hr tablet 90 mg Daily    melatonin tablet 6 mg Nightly PRN    metoprolol succinate (TOPROL-XL) 24 hr tablet 25 mg Daily    naloxone 0.4 mg/mL injection 0.02 mg PRN    NIFEdipine 24 hr tablet 60 mg BID    nitroGLYCERIN SL tablet 0.4 mg Q5 Min PRN    ondansetron disintegrating tablet 8 mg Q8H PRN    predniSONE tablet 5 mg Daily    prochlorperazine injection Soln 5 mg Q6H PRN    sodium bicarbonate tablet 650 mg BID    sodium chloride 0.65 % nasal spray 1 spray Q4H    sodium chloride 0.9% flush 10 mL Q12H PRN    tacrolimus capsule 3 mg BID       Objective:     Vital Signs (Most Recent):  Temp: 98.2 °F (36.8 °C) (10/18/23 0750)  Pulse: 63 (10/18/23 0750)  Resp: 18 (10/18/23 0750)  BP: (!) 107/54 (10/18/23 0750)  SpO2: 96 % (10/18/23 0750) Vital Signs (24h Range):  Temp:  [97.8 °F (36.6 °C)-98.5 °F (36.9 °C)] 98.2 °F (36.8 °C)  Pulse:  [56-65] 63  Resp:  [18-32] 18  SpO2:  [95 %-100 %] 96 %  BP: (106-148)/(50-67) 107/54     Weight: 93.5 kg (206 lb 2.1 oz) (10/18/23 0400)  Body mass index is 27.2 kg/m².  Body surface area is 2.19 meters squared.    I/O last 3 completed shifts:  In: -   Out: 400 [Urine:400]    Physical Exam  Vitals reviewed.   Constitutional:       General: He is not in acute distress.     Appearance:  Normal appearance. He is not ill-appearing or toxic-appearing.   HENT:      Head: Normocephalic and atraumatic.      Right Ear: External ear normal.      Left Ear: External ear normal.      Nose: Nose normal.   Eyes:      General: No scleral icterus.     Conjunctiva/sclera: Conjunctivae normal.   Cardiovascular:      Rate and Rhythm: Normal rate and regular rhythm.      Pulses: Normal pulses.      Heart sounds: Normal heart sounds. No murmur heard.     No friction rub.   Pulmonary:      Effort: Pulmonary effort is normal. No respiratory distress.      Breath sounds: Normal breath sounds. No wheezing or rhonchi.   Abdominal:      General: Bowel sounds are normal. There is no distension.      Palpations: Abdomen is soft.      Tenderness: There is no abdominal tenderness. There is no guarding.   Musculoskeletal:         General: No swelling or deformity. Normal range of motion.      Cervical back: Normal range of motion and neck supple. No tenderness.      Right lower leg: No edema.      Left lower leg: No edema.   Skin:     General: Skin is warm and dry.      Coloration: Skin is not jaundiced.      Findings: No erythema or rash.   Neurological:      General: No focal deficit present.      Mental Status: He is alert and oriented to person, place, and time.      Motor: No weakness.   Psychiatric:         Mood and Affect: Mood normal.         Behavior: Behavior normal.         Assessment/Plan:   67 yr old AAM with hx of kidney transplant in 2005 (bcrof 2.8-3.2), COPD, CAD s/p CABG in 2006 with active smoking, AFIB on amiodarone/Eliquis, HTN who is admitted for shortness of breath as well as dyspnea on exertion, Palliative care consulted yesterday for goals of care discussion, he states he has had enough dialysis and does not want to pursue dialysis now, but may consider it in the future after we discussed the risks of contrast by cardiology    1) Community acquired pneumonia  Treated by primary team, reviewed medication  interreactions    2) KATINA  on CKD G4A2 of kidney transplant  Most likely multifactorial, both cardiorenal, natural progression of CKD, and anemia that is symptomatic, recommend transfusion today for Hgb closer to 8.0  - baseline creatinine 2.8 to 3.2, today 5.5 with eGFR 12  - need accurate I/O so we can make thoughtful assessments and make safe clinical plans, thanks      3) Immunosuppression  - stopped taking Cellcept roughly a year ago per Dr. Diaz/Dr. Webb  - Tacrolimus goal between 4 and 6, continue 3 mg twice daily  - monitor for drug toxicity and interactions with other therapies daily    4) Hypertension  Not at goal of near 120/70 while in the hospital  - had CP that appears to be Angina, recommend cardiology manage for medical optimization, not sure he is a candidate for angiography  - recommend stopping Nifedipine and Hydralazine if possible   - does not appear clinically hypervolemic, will assess daily for diuretic need    5) Acid Base and Electrolytes  - NAGMA, mild sodium bicarbonate 650 mg twice daily, goal greater than 24    6) Anemia of CKD  - iron studies, Fe 34, % sat 19, Transferrin 121, TIBC 179, Ferritin 701, recommend IV iron 1 gram divide doses QOD, oral iron may not be sufficient with CKD4/5, Epogen 10K units weekly  - Hgb today 7.5 transfused 1 UPRBC 10/16/23, recommend additional transfusion for renal perfusion   - transfuse Hgb less than 7.0, consider higher threshold of 8.0 in the setting of CAD    7) CKD-MBD  Hyperphosphatemia, recommend dietary management at this time, consider phosphate binder if greater than 5.5  - last iPTH 9/23 was 32.9  - Vitamin D 1,25 10, D ,25 Hydroxy level 72 on 1/23    We will continue to follow. Please call if you have any questions.     Jose Warren DO  [facility]

## 2023-10-18 NOTE — PROGRESS NOTES
[facility] - Telemetry Stepdown  Cardiology  Progress Note    Patient Name: Ibrahima Phelps  MRN: [id number]  Admission Date: 10/13/2023  Hospital Length of Stay: 5 days  Code Status: Full Code   Attending Physician: Ricky Judge MD   Primary Care Physician: Anatoliy Colindres MD  Expected Discharge Date: 10/21/2023  Principal Problem:Unstable angina pectoris    Subjective:     Interval History: NAEON. Patient  has been chest pain free for almost 48 hours. Will complete 48 hours of heparin tonight, can resume home eliquis after.     Review of Systems   Constitutional: Negative for chills and fever.   HENT:  Negative for congestion.    Cardiovascular:  Negative for chest pain, dyspnea on exertion, leg swelling, near-syncope and palpitations.   Respiratory:  Negative for cough and shortness of breath.    Musculoskeletal:  Negative for back pain.   Gastrointestinal:  Negative for abdominal pain, diarrhea, nausea and vomiting.   Neurological:  Negative for headaches.     Objective:     Vital Signs (Most Recent):  Temp: 98.2 °F (36.8 °C) (10/18/23 0750)  Pulse: 63 (10/18/23 0750)  Resp: 18 (10/18/23 0750)  BP: (!) 107/54 (10/18/23 0750)  SpO2: 96 % (10/18/23 0750) Vital Signs (24h Range):  Temp:  [97.8 °F (36.6 °C)-98.5 °F (36.9 °C)] 98.2 °F (36.8 °C)  Pulse:  [56-65] 63  Resp:  [18-32] 18  SpO2:  [95 %-100 %] 96 %  BP: (106-148)/(50-67) 107/54     Weight: 93.5 kg (206 lb 2.1 oz)  Body mass index is 27.2 kg/m².     SpO2: 96 %         Intake/Output Summary (Last 24 hours) at 10/18/2023 0805  Last data filed at 10/17/2023 2327  Gross per 24 hour   Intake --   Output 400 ml   Net -400 ml       Lines/Drains/Airways       Peripheral Intravenous Line  Duration                  Peripheral IV - Single Lumen 10/16/23 2045 18 G Anterior;Right;Medial;Proximal Forearm 1 day                       Physical Exam  Constitutional:       General: He is not in acute distress.     Appearance: He is not ill-appearing.   HENT:      Nose: Nose  normal.      Mouth/Throat:      Mouth: Mucous membranes are moist.   Eyes:      Pupils: Pupils are equal, round, and reactive to light.   Neck:      Comments: No JVD appreciated   Cardiovascular:      Rate and Rhythm: Normal rate and regular rhythm.      Pulses: Normal pulses.      Heart sounds: No murmur heard.  Pulmonary:      Effort: Pulmonary effort is normal. No respiratory distress.      Breath sounds: No wheezing or rales.   Abdominal:      General: Abdomen is flat. There is no distension.      Palpations: Abdomen is soft.      Tenderness: There is no right CVA tenderness or left CVA tenderness.   Musculoskeletal:         General: No swelling.      Cervical back: Normal range of motion and neck supple. No tenderness.      Right lower leg: No edema.      Left lower leg: No edema.   Skin:     General: Skin is warm.      Coloration: Skin is not pale.      Findings: No rash.   Neurological:      General: No focal deficit present.      Mental Status: He is alert and oriented to person, place, and time.      Motor: No weakness.   Psychiatric:         Mood and Affect: Mood normal.         Behavior: Behavior normal.            Significant Labs: All pertinent lab results from the last 24 hours have been reviewed.    Significant Imaging: Echocardiogram: Transthoracic echo (TTE) complete (Cupid Only):   Results for orders placed or performed during the hospital encounter of 10/13/23   Echo   Result Value Ref Range    Ascending aorta 2.91 cm    STJ 2.78 cm    AV mean gradient 9 mmHg    Ao peak aris 2.12 m/s    Ao VTI 49.26 cm    IVRT 114.18 msec    IVS 1.02 0.6 - 1.1 cm    LA size 4.49 cm    Left Atrium Major Axis 6.82 cm    Left Atrium Minor Axis 6.21 cm    LVIDd 5.74 3.5 - 6.0 cm    LVIDs 4.31 (A) 2.1 - 4.0 cm    LVOT diameter 2.2 cm    LVOT peak VTI 24.00 cm    Posterior Wall 0.84 0.6 - 1.1 cm    MV Peak A Aris 0.74 m/s    E wave deceleration time 217.91 msec    MV Peak E Aris 0.96 m/s    RA Major Axis 5.75 cm    RA  Width 4.01 cm    RVDD 3.18 cm    Sinus 3.37 cm    TAPSE 2.09 cm    TR Max Aris 3.74 m/s    LA WIDTH 5.14 cm    LV Diastolic Volume 162.44 mL    LV Systolic Volume 83.57 mL    LVOT peak aris 1.15 m/s    TDI LATERAL 0.09 m/s    TDI SEPTAL 0.07 m/s    LA volume (mod) 117.00 cm3    LV LATERAL E/E' RATIO 10.67 m/s    LV SEPTAL E/E' RATIO 13.71 m/s    FS 25 %    LA volume 127.52 cm3    LV mass 208.48 g    ZLVIDD -2.39     ZLVIDS -0.22     Left Ventricle Relative Wall Thickness 0.29 cm    AV valve area 1.85 cm²    AV Velocity Ratio 0.54     AV index (prosthetic) 0.49     E/A ratio 1.30     Mean e' 0.08 m/s    LVOT area 3.8 cm2    LVOT stroke volume 91.19 cm3    AV peak gradient 18 mmHg    E/E' ratio 12.00 m/s    LV Systolic Volume Index 38.3 mL/m2    LV Diastolic Volume Index 74.51 mL/m2    LA Volume Index 58.5 mL/m2    LV Mass Index 96 g/m2    Triscuspid Valve Regurgitation Peak Gradient 56 mmHg    LA Volume Index (Mod) 53.7 mL/m2    VERONICA by Velocity Ratio 2.06 cm²    BSA 2.2 m2    TV resting pulmonary artery pressure 59 mmHg    RV TB RVSP 7 mmHg    Est. RA pres 3 mmHg    Narrative      Left Ventricle: The left ventricle is normal in size. Ventricular mass   is normal. Normal wall thickness. Normal wall motion. There is normal   systolic function with a visually estimated ejection fraction of 60 - 65%.   Grade II diastolic dysfunction.    Left Atrium: Left atrium is severely dilated.    Right Ventricle: Normal right ventricular cavity size. Wall thickness   is normal. Right ventricle wall motion  is normal. Systolic function is   normal.    Right Atrium: Right atrium is dilated.    Aortic Valve: Moderately calcified cusps. There is moderate annular   calcification present. Mildly restricted motion. There is mild stenosis.   Aortic valve area by VTI is 1.85 cm². Aortic valve peak velocity is 2.12   m/s. Mean gradient is 9 mmHg. The dimensionless index is 0.49. There is   mild to moderate aortic regurgitation.    Pulmonary  Artery: The estimated pulmonary artery systolic pressure is   59 mmHg.    IVC/SVC: Normal venous pressure at 3 mmHg.       Assessment and Plan:     * Unstable angina pectoris  67 yr old AAM with paroxysmal Afib s/p cardioversion 3/2022 (on Eliquis, Metoprolol, and Amiodarone),  HFpEF (EF 65%), kidney transplant in 4/12/2005 (bcr of 2.8-3.2), COPD, CAD s/p PCI in 2006, HTN.    Patient began to have chest pain at 1115. Patient was given nitro with relief of symptoms.  Primary team was at beside, EKG with no ST segment changes. Discussed about performing LHC with the patient for possible stenting. Patient refused due to possible nephrotoxic effects 2/2 contrasts dye as patient has CKD and has kidney transplants. Patient stated that he does not to be back on dialysis. He is aware about the risk and benefits of not performing the LHC. However, he is agreeable to medical therapy.     Plan:  - ACS protocol was initiated.   - heparin gtt for 48 hours, will resume home eliquis plus plavix on d/c, NO need for DAPT currently   - concern for colchiceine interaction with amio contraindication consider d/cing colchicine   - Rec switching from home omeprazole to daily Protonix as omeprazole decreases effectiveness of plavix    - Beta-Blocker: metoprolol 25 mg  - Statin: atorvastatin 10 mg rec titrating up to 40mg to make high intensity   - long-acting 60 mg Imdur for anginal pain  - Cardiology will sign off, please re-consult for any further assistance       Chest pain       Acute on chronic heart failure with preserved ejection fraction  Echo    Interpretation Summary    Left Ventricle: The left ventricle is normal in size. Ventricular mass is normal. Normal wall thickness. Normal wall motion. There is normal systolic function with a visually estimated ejection fraction of 60 - 65%. Grade II diastolic dysfunction.    Left Atrium: Left atrium is severely dilated.    Right Ventricle: Normal right ventricular cavity size. Wall  thickness is normal. Right ventricle wall motion  is normal. Systolic function is normal.    Right Atrium: Right atrium is dilated.    Aortic Valve: Moderately calcified cusps. There is moderate annular calcification present. Mildly restricted motion. There is mild stenosis. Aortic valve area by VTI is 1.85 cm². Aortic valve peak velocity is 2.12 m/s. Mean gradient is 9 mmHg. The dimensionless index is 0.49. There is mild to moderate aortic regurgitation.    Pulmonary Artery: The estimated pulmonary artery systolic pressure is 59 mmHg.    IVC/SVC: Normal venous pressure at 3 mmHg.    Patient is euvolemic, no JVD, lung exam clear to auscultation, 1+ pitting edema bilateral lower extremities.    Plan:   - Ok to hold lasix 2/2 to elevated creatinine and per KTM.   - optimize blood pressure management/GDMT   - Continue metoprolol 25 mg, nifedipine 60 mg, hydralazine 50 mg t.i.d.    SOB (shortness of breath)  Presentation patient had a BNP of 1100, troponins were flat, no EKG changes.  Chest x-ray concerning for right lower lobe opacities and CT with pulmonary edema.  Patient was started on antibiotics and Lasix IV 60 mg daily.  Patient is on 20 mg p.o. Lasix every other day at home.      -- EF 55% GIIDD         VTE Risk Mitigation (From admission, onward)           Ordered     apixaban tablet 5 mg  2 times daily         10/18/23 1050     heparin 25,000 units in dextrose 5% 250 mL (100 units/mL) infusion LOW INTENSITY nomogram - [facility]  Continuous        Question:  Begin at (units/kg/hr)  Answer:  12    10/16/23 1133     heparin 25,000 units in dextrose 5% (100 units/ml) IV bolus from bag - ADDITIONAL PRN BOLUS - 60 units/kg (max bolus 4000 units)  As needed (PRN)        Question:  Heparin Infusion Adjustment (DO NOT MODIFY ANSWER)  Answer:  [email or web address]\epic\Images\Pharmacy\HeparinInfusions\heparin LOW INTENSITY nomogram for [facility] [id number]    10/16/23 1133     heparin 25,000 units in dextrose 5% (100 units/ml) IV bolus  from bag - ADDITIONAL PRN BOLUS - 30 units/kg (max bolus 4000 units)  As needed (PRN)        Question:  Heparin Infusion Adjustment (DO NOT MODIFY ANSWER)  Answer:  [email or web address]\epic\Images\Pharmacy\HeparinInfusions\heparin LOW INTENSITY nomogram for [facility] [id number]    10/16/23 1133                    [place], DO  Cardiology  [facility] - Telemetry Stepdown

## 2023-10-18 NOTE — ASSESSMENT & PLAN NOTE
67 yr old AAM with paroxysmal Afib s/p cardioversion 3/2022 (on Eliquis, Metoprolol, and Amiodarone),  HFpEF (EF 65%), kidney transplant in 4/12/2005 (bcr of 2.8-3.2), COPD, CAD s/p PCI in 2006, HTN.    Patient began to have chest pain at 1115. Patient was given nitro with relief of symptoms.  Primary team was at beside, EKG with no ST segment changes. Discussed about performing LHC with the patient for possible stenting. Patient refused due to possible nephrotoxic effects 2/2 contrasts dye as patient has CKD and has kidney transplants. Patient stated that he does not to be back on dialysis. He is aware about the risk and benefits of not performing the LHC. However, he is agreeable to medical therapy.     Plan:  - ACS protocol was initiated.   - heparin gtt for 48 hours, will resume home eliquis plus plavix on d/c   - concern for colchiceine interaction with amio contraindication consider d/cing colchicine   - Rec switching from home omeprazole to daily Protonix as omeprazole decreases effectiveness of plavix    - Beta-Blocker: metoprolol 25 mg  - Statin: atorvastatin 10 mg rec titrating up to 40mg to make high intensity   - long-acting 60 mg Imdur for anginal pain  - Cardiology will sign off, please re-consult for any further assistance

## 2023-10-18 NOTE — SUBJECTIVE & OBJECTIVE
Interval History: NAEON. Patient  has been chest pain free for almost 48 hours. Will complete 48 hours of heparin tonight, can resume home eliquis after.     Review of Systems   Constitutional: Negative for chills and fever.   HENT:  Negative for congestion.    Cardiovascular:  Negative for chest pain, dyspnea on exertion, leg swelling, near-syncope and palpitations.   Respiratory:  Negative for cough and shortness of breath.    Musculoskeletal:  Negative for back pain.   Gastrointestinal:  Negative for abdominal pain, diarrhea, nausea and vomiting.   Neurological:  Negative for headaches.     Objective:     Vital Signs (Most Recent):  Temp: 98.2 °F (36.8 °C) (10/18/23 0750)  Pulse: 63 (10/18/23 0750)  Resp: 18 (10/18/23 0750)  BP: (!) 107/54 (10/18/23 0750)  SpO2: 96 % (10/18/23 0750) Vital Signs (24h Range):  Temp:  [97.8 °F (36.6 °C)-98.5 °F (36.9 °C)] 98.2 °F (36.8 °C)  Pulse:  [56-65] 63  Resp:  [18-32] 18  SpO2:  [95 %-100 %] 96 %  BP: (106-148)/(50-67) 107/54     Weight: 93.5 kg (206 lb 2.1 oz)  Body mass index is 27.2 kg/m².     SpO2: 96 %         Intake/Output Summary (Last 24 hours) at 10/18/2023 0805  Last data filed at 10/17/2023 2327  Gross per 24 hour   Intake --   Output 400 ml   Net -400 ml       Lines/Drains/Airways       Peripheral Intravenous Line  Duration                  Peripheral IV - Single Lumen 10/16/23 2045 18 G Anterior;Right;Medial;Proximal Forearm 1 day                       Physical Exam  Constitutional:       General: He is not in acute distress.     Appearance: He is not ill-appearing.   HENT:      Nose: Nose normal.      Mouth/Throat:      Mouth: Mucous membranes are moist.   Eyes:      Pupils: Pupils are equal, round, and reactive to light.   Neck:      Comments: No JVD appreciated   Cardiovascular:      Rate and Rhythm: Normal rate and regular rhythm.      Pulses: Normal pulses.      Heart sounds: No murmur heard.  Pulmonary:      Effort: Pulmonary effort is normal. No  respiratory distress.      Breath sounds: No wheezing or rales.   Abdominal:      General: Abdomen is flat. There is no distension.      Palpations: Abdomen is soft.      Tenderness: There is no right CVA tenderness or left CVA tenderness.   Musculoskeletal:         General: No swelling.      Cervical back: Normal range of motion and neck supple. No tenderness.      Right lower leg: No edema.      Left lower leg: No edema.   Skin:     General: Skin is warm.      Coloration: Skin is not pale.      Findings: No rash.   Neurological:      General: No focal deficit present.      Mental Status: He is alert and oriented to person, place, and time.      Motor: No weakness.   Psychiatric:         Mood and Affect: Mood normal.         Behavior: Behavior normal.            Significant Labs: All pertinent lab results from the last 24 hours have been reviewed.    Significant Imaging: Echocardiogram: Transthoracic echo (TTE) complete ([facility] Only):   Results for orders placed or performed during the hospital encounter of 10/13/23   Echo   Result Value Ref Range    Ascending aorta 2.91 cm    STJ 2.78 cm    AV mean gradient 9 mmHg    Ao peak aris 2.12 m/s    Ao VTI 49.26 cm    IVRT 114.18 msec    IVS 1.02 0.6 - 1.1 cm    LA size 4.49 cm    Left Atrium Major Axis 6.82 cm    Left Atrium Minor Axis 6.21 cm    LVIDd 5.74 3.5 - 6.0 cm    LVIDs 4.31 (A) 2.1 - 4.0 cm    LVOT diameter 2.2 cm    LVOT peak VTI 24.00 cm    Posterior Wall 0.84 0.6 - 1.1 cm    MV Peak A Aris 0.74 m/s    E wave deceleration time 217.91 msec    MV Peak E Aris 0.96 m/s    RA Major Axis 5.75 cm    RA Width 4.01 cm    RVDD 3.18 cm    Sinus 3.37 cm    TAPSE 2.09 cm    TR Max Aris 3.74 m/s    LA WIDTH 5.14 cm    LV Diastolic Volume 162.44 mL    LV Systolic Volume 83.57 mL    LVOT peak aris 1.15 m/s    TDI LATERAL 0.09 m/s    TDI SEPTAL 0.07 m/s    LA volume (mod) 117.00 cm3    LV LATERAL E/E' RATIO 10.67 m/s    LV SEPTAL E/E' RATIO 13.71 m/s    FS 25 %    LA volume 127.52  cm3    LV mass 208.48 g    ZLVIDD -2.39     ZLVIDS -0.22     Left Ventricle Relative Wall Thickness 0.29 cm    AV valve area 1.85 cm²    AV Velocity Ratio 0.54     AV index (prosthetic) 0.49     E/A ratio 1.30     Mean e' 0.08 m/s    LVOT area 3.8 cm2    LVOT stroke volume 91.19 cm3    AV peak gradient 18 mmHg    E/E' ratio 12.00 m/s    LV Systolic Volume Index 38.3 mL/m2    LV Diastolic Volume Index 74.51 mL/m2    LA Volume Index 58.5 mL/m2    LV Mass Index 96 g/m2    Triscuspid Valve Regurgitation Peak Gradient 56 mmHg    LA Volume Index (Mod) 53.7 mL/m2    VERONICA by Velocity Ratio 2.06 cm²    BSA 2.2 m2    TV resting pulmonary artery pressure 59 mmHg    RV TB RVSP 7 mmHg    Est. RA pres 3 mmHg    Narrative      Left Ventricle: The left ventricle is normal in size. Ventricular mass   is normal. Normal wall thickness. Normal wall motion. There is normal   systolic function with a visually estimated ejection fraction of 60 - 65%.   Grade II diastolic dysfunction.    Left Atrium: Left atrium is severely dilated.    Right Ventricle: Normal right ventricular cavity size. Wall thickness   is normal. Right ventricle wall motion  is normal. Systolic function is   normal.    Right Atrium: Right atrium is dilated.    Aortic Valve: Moderately calcified cusps. There is moderate annular   calcification present. Mildly restricted motion. There is mild stenosis.   Aortic valve area by VTI is 1.85 cm². Aortic valve peak velocity is 2.12   m/s. Mean gradient is 9 mmHg. The dimensionless index is 0.49. There is   mild to moderate aortic regurgitation.    Pulmonary Artery: The estimated pulmonary artery systolic pressure is   59 mmHg.    IVC/SVC: Normal venous pressure at 3 mmHg.

## 2023-10-18 NOTE — ASSESSMENT & PLAN NOTE
Presentation patient had a BNP of 1100, troponins were flat, no EKG changes.  Chest x-ray concerning for right lower lobe opacities and CT with pulmonary edema.  Patient was started on antibiotics and Lasix IV 60 mg daily.  Patient is on 20 mg p.o. Lasix every other day at home.      -- EF 55% GIIDD

## 2023-10-18 NOTE — PLAN OF CARE
Problem: Adult Inpatient Plan of Care  Goal: Absence of Hospital-Acquired Illness or Injury  Intervention: Identify and Manage Fall Risk  Flowsheets (Taken 10/18/2023 0117)  Safety Promotion/Fall Prevention:   assistive device/personal item within reach   family to remain at bedside   high risk medications identified   lighting adjusted   medications reviewed   nonskid shoes/socks when out of bed  Intervention: Prevent Skin Injury  Flowsheets (Taken 10/18/2023 0117)  Body Position:   position changed independently   weight shifting   lower extremity elevated  Intervention: Prevent and Manage VTE (Venous Thromboembolism) Risk  Flowsheets (Taken 10/18/2023 0117)  Activity Management: Sitting at edge of bed - L2  Intervention: Prevent Infection  Flowsheets (Taken 10/18/2023 0117)  Infection Prevention:   personal protective equipment utilized   rest/sleep promoted   hand hygiene promoted   equipment surfaces disinfected   single patient room provided  Pt in bed NAD, safety measures in progress, call light in reach, bed in lowest position, bed alarm on, wife remains at bed side no complaints voiced.

## 2023-10-18 NOTE — PROGRESS NOTES
[facility] - Telemetry Stepdown  Palliative Medicine  Progress Note    Patient Name: Ibrahima Phelps  MRN: [id number]  Admission Date: 10/13/2023  Hospital Length of Stay: 5 days  Code Status: Full Code   Attending Provider: Ricky Judge MD  Consulting Provider: TRUDI Stone  Primary Care Physician: Anatoliy Colindres MD  Principal Problem:Unstable angina pectoris    Patient information was obtained from patient, spouse/SO, past medical records and primary team.      Assessment/Plan:     Palliative Care  Palliative care encounter  Palliative care follow up to  goals of care and advanced care planning as discussed with primary team resident, for Mr. Ibrahima Phelps. Mr. Phelps known to [facility] from previous admissions.  He is awake alert and oriented x3, reports no pain or discomforts.  Admitted to hospital medicine with chief complaint of unstable angina.       Advance Care Planning   - No ACP documents received  - Mr. Phelps appears able to make medical decisions   - next of kin is wife Patti Phelps [phone]   - full code per primary team = Mr. Phelps reports no change in resuscitation status and he is aware of risks and his survivability to cpr      Goals of Care  - [facility] APRN and LCSW  met with patient and wife at bedside   -  states understanding his current clinical condition and reports no change in previous established goals of care.   - - Reports the heparin drip will be discontinued and he will start of lasix again.   - Hopeful the lasix will give his kidney the boost he needs.  -  States he gets his strength through his luis a in God and support from his family - daughter and his wife.   Mr. Phelps freely accepts his fate as this is God's plan.   -   has not be amenable to HD and confirms his wish is to  continue medical management   -   emotional support provided     Symptom Management   - no c/o pain,nausea  - mild shortness of breath occasionally on exertion   - room air oxygen  "saturations 97 - 100%    Unstable angina pectoris/ CKD (chronic kidney disease), stage IV  Managed per primary team and specialty consultants   - no c/o chest pain at this time   - heparin drip in progress for 48 hrs - ACS protocol nearing end   - Cr. Trending upward 5.7 today will restart lasix   - [facility] following for goals of care and advanced care planning - see above       PlanRecommendations   - continue current plan of care   - amenable to pal med follow up - referral sent   - patient is not amenable to seeing hospital  and encouraged him to talk more with his    - emotional support provided      Dr. Judge primary team attending and resident notified of the above.       Thank you for consult and opportunity to participate in Mr. Phelps's care      Thank you for consult and opportunity         I will follow-up with patient. Please contact us if you have any additional questions.    Subjective:     Chief Complaint:   Chief Complaint   Patient presents with    Shortness of Breath     + stent       HPI:   HPI obtained from chart review: 68 yo M active smoker w/ COPD, CAD s/p CABG x1 in 2006, paroxysmal Afib s/p cardioversion 3/2022 (on Eliquis, Metoprolol, and Amiodarone), HFpEF (EF 65%; not currently taking diuretics), hiatal hernia with reflux, HTN on Hydralazine, CKD4 s/p kidney transplants in 1992 and 2005 on Tacrolimus and Prednisone, presented to the ED with his wife complaining about worsening shortness of breath/DAUGHERTY; infrequent, nonproductive cough; and severe, sharp L posterior heel pain that started a few days ago. Per the patient, he woke up last night "gasping for air," short of breath, and with substernal chest pain radiating to his back. The chest pain resolved with Omeprazole; denies chest pain at this time. The shortness of breath and infrequent, nonproductive cough typically occur at rest and are worse with walking. The severe, sharp L posterior heel pain is worse with movement and " walking, and has not responded to leg elevation. Of note, he has a history of tenosynovitis. He noted significant, watery diarrhea associated with stomach pain all day yesterday that resolved with Peptobismol and water; denies diarrhea this morning. Denies fevers, chills, nausea, vomiting, constipation, dysuria, hematuria, palpitations, headache, or vision changes.      His presenting vital signs were a temperature of 98.3, pulse rate of 57-84 bpm, resp rate of 20-28, blood pressure ranging from 156-169/69-74, MAP of 104-107, and SpO2 % on RA. Troponin (0.081), Magnesium (nml), CBC (Hgb 8.2, platelets 127), CMP (CO2 19, BUN 33, Cr 4.1), and BNP (1,168) were ordered. CXR showed RLL consolidation. XR L ankle showed calcaneal enthesopathy. CTAP showed trace bilateral pleural effusions, bibasilar dependent pulmonary opacities, new prominence/thickening of the L iliac fossa transplant kidney's ureter. Started on CTX, AZT, and Lasix              Hospital Course:  No notes on file    Interval History:     No adverse events overnight,  Appears comfortable, wife at bedside.   Past Medical History:   Diagnosis Date    Atrial fibrillation     CAD (coronary artery disease) 2006    MI in 2006    CHF (congestive heart failure) 2017    CKD (chronic kidney disease) stage 3, GFR 30-59 ml/min     Dr. Latif.  in transplanted kidney    COVID-19 2/7/2023    Diverticulosis     Hyperlipidemia     Hypertension     Keloid cicatrix     Metabolic bone disease     Other emphysema 10/1/2019    Pericarditis     S/P kidney transplant 1992    x2 (1992 & 2005),    Thrombocytopenia     Thyroid disease     Tobacco use 09/05/2014       Past Surgical History:   Procedure Laterality Date    CARDIOVERSION  04/30/15    CHOLECYSTECTOMY      COLONOSCOPY  April 20, 2011    Diverticulosis, repeat recommended in 3 yrs., repeat colonoscopy 2014 revealed 2 polyps.  He should return in 5 years.    COLONOSCOPY N/A 3/13/2020     "Procedure: COLONOSCOPY;  Surgeon: Chon Casper MD;  Location: [facility] (4TH FLR);  Service: Endoscopy;  Laterality: N/A;  ok to hold coumadin x5days-see telephone encounter 2/4/20-tb    COLONOSCOPY N/A 2/4/2021    Procedure: COLONOSCOPY;  Surgeon: Chon Casper MD;  Location: [facility] (4TH FLR);  Service: Endoscopy;  Laterality: N/A;  Eliquis - per Dr. GAVIN Blunt ok to hold x 2 days and "restarted asap"- ERW  last prep "poor", [id number] ordered/ Pt requests Dr. Casper  prep ins. mailed - COVID screening on 2/1/21 PCW- ERW    COLONOSCOPY N/A 3/3/2021    Procedure: COLONOSCOPY;  Surgeon: Chon Casper MD;  Location: [facility] (4TH FLR);  Service: Endoscopy;  Laterality: N/A;  Patient with his second poor bowel pre and has poor prep today.  If patient not intersted or can't get colonoscopy tomorrow will need constipation bowel prep and will need to restart his Eliquis today.Thanks,Chon Blunt-ok to hold Eliquis 2 days prior      COVID     CORONARY ANGIOPLASTY WITH STENT PLACEMENT  9/13/2006    IRRIGATION AND DEBRIDEMENT Right 8/30/2023    Procedure: IRRIGATION AND DEBRIDEMENT, RIGHT MIDDLE FINGER;  Surgeon: Martin Larsen MD;  [facility];  Service: Orthopedics;  Laterality: Right;    KIDNEY TRANSPLANT  2005    PARATHYROIDECTOMY      TREATMENT OF CARDIAC ARRHYTHMIA N/A 3/28/2022    Procedure: CARDIOVERSION;  Surgeon: Migue Blunt MD;  Location: [facility] EP LAB;  Service: Cardiology;  Laterality: N/A;  AF, DCC, MAC, GP, 3 PREP*RODRIGO deferred pt 100% compliant*       Review of patient's allergies indicates:   Allergen Reactions    Ace inhibitors Swelling    Verapamil Other (See Comments)     Other reaction(s): Unknown    Povidone-iodine Itching       Medications:  Continuous Infusions:      Scheduled Meds:   amiodarone  200 mg Oral Daily    apixaban  5 mg Oral BID    [START ON 10/19/2023] atorvastatin  40 mg Oral Daily    calcium carbonate  1,000 mg Oral Once    " clopidogreL  75 mg Oral Daily    doxazosin  4 mg Oral Q12H    ferrous sulfate  1 tablet Oral Daily    hydrALAZINE  25 mg Oral Q8H    [START ON 10/19/2023] isosorbide mononitrate  60 mg Oral Daily    metoprolol succinate  25 mg Oral Daily    pantoprazole  40 mg Oral Daily    predniSONE  5 mg Oral Daily    sodium bicarbonate  650 mg Oral BID    sodium chloride  1 spray Each Nostril Q4H    tacrolimus  3 mg Oral BID     PRN Meds:0.9%  NaCl infusion (for blood administration), 0.9%  NaCl infusion (for blood administration), acetaminophen, dextrose 10%, dextrose 10%, glucagon (human recombinant), glucose, glucose, ipratropium, melatonin, methocarbamoL, naloxone, nitroGLYCERIN, ondansetron, prochlorperazine, sodium chloride 0.9%    Family History       Problem Relation (Age of Onset)    Heart disease Father    Kidney disease Sister, Brother    Kidney failure Sister, Brother    No Known Problems Sister, Brother, Sister, Sister    Thyroid disease Mother          Tobacco Use    Smoking status: Former     Current packs/day: 0.00     Average packs/day: 0.5 packs/day for 40.0 years (20.0 ttl pk-yrs)     Types: Cigarettes     Start date: 1982     Quit date: 2022     Years since quittin.6    Smokeless tobacco: Never    Tobacco comments:     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     Patient is currently retired   Substance and Sexual Activity    Alcohol use: No    Drug use: No    Sexual activity: Yes     Partners: Female       Review of Systems   Constitutional:  Positive for activity change.   Respiratory:  Positive for cough and shortness of breath.    Cardiovascular:  Positive for leg swelling.   Gastrointestinal:  Positive for abdominal distention.   Neurological:  Positive for weakness.     Objective:     Vital Signs (Most Recent):  Temp: 97.4 °F (36.3 °C) (10/18/23 153)  Pulse: 62 (10/18/23 153)  Resp: 18 (10/18/23 1534)  BP: (!) 105/50 (10/18/23  1534)  SpO2: 99 % (10/18/23 1534) Vital Signs (24h Range):  Temp:  [97.4 °F (36.3 °C)-98.3 °F (36.8 °C)] 97.4 °F (36.3 °C)  Pulse:  [56-63] 62  Resp:  [18-32] 18  SpO2:  [96 %-100 %] 99 %  BP: (105-124)/(50-58) 105/50     Weight: 93.5 kg (206 lb 2.1 oz)  Body mass index is 27.2 kg/m².       Physical Exam  Vitals and nursing note reviewed.   Constitutional:       Appearance: He is ill-appearing.   HENT:      Head: Normocephalic.   Cardiovascular:      Rate and Rhythm: Normal rate and regular rhythm.   Pulmonary:      Comments: Supplemental oxygen  Abdominal:      General: There is distension.   Musculoskeletal:      Comments: Left heel pain   Skin:     General: Skin is warm and dry.   Neurological:      Mental Status: He is alert.            Review of Symptoms      Symptom Assessment (ESAS 0-10 Scale)  Pain:  0  Dyspnea:  0  Anxiety:  0  Nausea:  0  Depression:  0  Anorexia:  0  Fatigue:  0  Insomnia:  0  Restlessness:  0  Agitation:  0     CAM / Delirium:  Negative  Constipation:  Negative  Diarrhea:  Negative      Pain Assessment:  OME in 24 hours:  0  Location(s):      Living Arrangements:  Lives with spouse    Psychosocial/Cultural:   See Palliative Psychosocial Note: Yes  Lives with wife of 45  years, one daughter, no grandchildren, enjoyed being outside, with family and fishing   **Primary  to Follow**  Palliative Care  Consult: Yes    Spiritual:  F - Ya and Belief:  Mandaen   A - Address in Care:  Declined  services         Advance Care Planning  Advance Directives:   Living Will: No        Oral Declaration: No    LaPOST: No    Do Not Resuscitate Status: No    Medical Power of : No        Oral Declaration: No      Decision Making:  Patient answered questions  Goals of Care: What is most important right now is to focus on spending time at home, remaining as independent as possible, symptom/pain control, quality of life, even if it means sacrificing a little time.  Accordingly, we have decided that the best plan to meet the patient's goals includes continuing with treatment.         Significant Labs: All pertinent labs within the past 24 hours have been reviewed.  CBC:   Recent Labs   Lab 10/18/23  0601   WBC 7.64   HGB 7.4*   HCT 23.3*   MCV 79*   *       BMP:  Recent Labs   Lab 10/18/23  0601   GLU 87      K 4.1      CO2 22*   BUN 39*   CREATININE 5.5*   CALCIUM 7.0*   MG 2.7*       LFT:  Lab Results   Component Value Date    AST 15 10/18/2023    ALKPHOS 60 10/18/2023    BILITOT 0.3 10/18/2023     Albumin:   Albumin   Date Value Ref Range Status   10/18/2023 2.5 (L) 3.5 - 5.2 g/dL Final     Protein:   Total Protein   Date Value Ref Range Status   10/18/2023 5.7 (L) 6.0 - 8.4 g/dL Final     Lactic acid:   Lab Results   Component Value Date    LACTATE 1.3 10/16/2023    LACTATE 1.1 10/13/2023       Significant Imaging: I have reviewed all pertinent imaging results/findings within the past 24 hours.        Radha Jacobs, CNS  Palliative Medicine  [facility] - Telemetry Stepdown

## 2023-10-18 NOTE — ASSESSMENT & PLAN NOTE
Echo    Interpretation Summary    Left Ventricle: The left ventricle is normal in size. Ventricular mass is normal. Normal wall thickness. Normal wall motion. There is normal systolic function with a visually estimated ejection fraction of 60 - 65%. Grade II diastolic dysfunction.    Left Atrium: Left atrium is severely dilated.    Right Ventricle: Normal right ventricular cavity size. Wall thickness is normal. Right ventricle wall motion  is normal. Systolic function is normal.    Right Atrium: Right atrium is dilated.    Aortic Valve: Moderately calcified cusps. There is moderate annular calcification present. Mildly restricted motion. There is mild stenosis. Aortic valve area by VTI is 1.85 cm². Aortic valve peak velocity is 2.12 m/s. Mean gradient is 9 mmHg. The dimensionless index is 0.49. There is mild to moderate aortic regurgitation.    Pulmonary Artery: The estimated pulmonary artery systolic pressure is 59 mmHg.    IVC/SVC: Normal venous pressure at 3 mmHg.    Patient is euvolemic, no JVD, lung exam clear to auscultation, 1+ pitting edema bilateral lower extremities.    Plan:   - Ok to hold lasix 2/2 to elevated creatinine and per KTM.   - optimize blood pressure management/GDMT   - Continue metoprolol 25 mg, nifedipine 60 mg, hydralazine 50 mg t.i.d.   Home

## 2023-10-18 NOTE — SUBJECTIVE & OBJECTIVE
"Interval History:       Past Medical History:   Diagnosis Date    Atrial fibrillation     CAD (coronary artery disease) 2006    MI in 2006    CHF (congestive heart failure) 2017    CKD (chronic kidney disease) stage 3, GFR 30-59 ml/min     Dr. Latif.  in transplanted kidney    COVID-19 2/7/2023    Diverticulosis     Hyperlipidemia     Hypertension     Keloid cicatrix     Metabolic bone disease     Other emphysema 10/1/2019    Pericarditis     S/P kidney transplant 1992    x2 (1992 & 2005),    Thrombocytopenia     Thyroid disease     Tobacco use 09/05/2014       Past Surgical History:   Procedure Laterality Date    CARDIOVERSION  04/30/15    CHOLECYSTECTOMY      COLONOSCOPY  April 20, 2011    Diverticulosis, repeat recommended in 3 yrs., repeat colonoscopy 2014 revealed 2 polyps.  He should return in 5 years.    COLONOSCOPY N/A 3/13/2020    Procedure: COLONOSCOPY;  Surgeon: Chon Casper MD;  Location: [facility] (4TH FLR);  Service: Endoscopy;  Laterality: N/A;  ok to hold coumadin x5days-see telephone encounter 2/4/20-tb    COLONOSCOPY N/A 2/4/2021    Procedure: COLONOSCOPY;  Surgeon: Chon Casper MD;  Location: [facility] (4TH FLR);  Service: Endoscopy;  Laterality: N/A;  Eliquis - per Dr. GAVIN Blunt ok to hold x 2 days and "restarted asap"- ERW  last prep "poor", [id number] ordered/ Pt requests Dr. Casper  prep ins. mailed - COVID screening on 2/1/21 PCW- ERW    COLONOSCOPY N/A 3/3/2021    Procedure: COLONOSCOPY;  Surgeon: Chon Casper MD;  Location: [facility] (4TH FLR);  Service: Endoscopy;  Laterality: N/A;  Patient with his second poor bowel pre and has poor prep today.  If patient not intersted or can't get colonoscopy tomorrow will need constipation bowel prep and will need to restart his Eliquis today.Thanks,Chon Blunt-ok to hold Eliquis 2 days prior      COVID     CORONARY ANGIOPLASTY WITH STENT PLACEMENT  9/13/2006    IRRIGATION AND DEBRIDEMENT Right 8/30/2023    Procedure: " IRRIGATION AND DEBRIDEMENT, RIGHT MIDDLE FINGER;  Surgeon: Martin Larsen MD;  Location: [facility];  Service: Orthopedics;  Laterality: Right;    KIDNEY TRANSPLANT  2005    PARATHYROIDECTOMY      TREATMENT OF CARDIAC ARRHYTHMIA N/A 3/28/2022    Procedure: CARDIOVERSION;  Surgeon: Migue Blunt MD;  Location: [facility] EP LAB;  Service: Cardiology;  Laterality: N/A;  AF, DCC, MAC, GP, 3 PREP*RODRIGO deferred pt 100% compliant*       Review of patient's allergies indicates:   Allergen Reactions    Ace inhibitors Swelling    Verapamil Other (See Comments)     Other reaction(s): Unknown    Povidone-iodine Itching       Medications:  Continuous Infusions:      Scheduled Meds:   amiodarone  200 mg Oral Daily    apixaban  5 mg Oral BID    [START ON 10/19/2023] atorvastatin  40 mg Oral Daily    calcium carbonate  1,000 mg Oral Once    clopidogreL  75 mg Oral Daily    doxazosin  4 mg Oral Q12H    ferrous sulfate  1 tablet Oral Daily    hydrALAZINE  25 mg Oral Q8H    [START ON 10/19/2023] isosorbide mononitrate  60 mg Oral Daily    metoprolol succinate  25 mg Oral Daily    pantoprazole  40 mg Oral Daily    predniSONE  5 mg Oral Daily    sodium bicarbonate  650 mg Oral BID    sodium chloride  1 spray Each Nostril Q4H    tacrolimus  3 mg Oral BID     PRN Meds:0.9%  NaCl infusion (for blood administration), 0.9%  NaCl infusion (for blood administration), acetaminophen, dextrose 10%, dextrose 10%, glucagon (human recombinant), glucose, glucose, ipratropium, melatonin, methocarbamoL, naloxone, nitroGLYCERIN, ondansetron, prochlorperazine, sodium chloride 0.9%    Family History       Problem Relation (Age of Onset)    Heart disease Father    Kidney disease Sister, Brother    Kidney failure Sister, Brother    No Known Problems Sister, Brother, Sister, Sister    Thyroid disease Mother          Tobacco Use    Smoking status: Former     Current packs/day: 0.00     Average packs/day: 0.5 packs/day for 40.0 years (20.0 ttl  pk-yrs)     Types: Cigarettes     Start date: 1982     Quit date: 2022     Years since quittin.6    Smokeless tobacco: Never    Tobacco comments:     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     Patient is currently retired   Substance and Sexual Activity    Alcohol use: No    Drug use: No    Sexual activity: Yes     Partners: Female       Review of Systems   Constitutional:  Positive for activity change.   Respiratory:  Positive for cough and shortness of breath.    Cardiovascular:  Positive for leg swelling.   Gastrointestinal:  Positive for abdominal distention.   Neurological:  Positive for weakness.     Objective:     Vital Signs (Most Recent):  Temp: 97.4 °F (36.3 °C) (10/18/23 1534)  Pulse: 62 (10/18/23 1534)  Resp: 18 (10/18/23 1534)  BP: (!) 105/50 (10/18/23 1534)  SpO2: 99 % (10/18/23 1534) Vital Signs (24h Range):  Temp:  [97.4 °F (36.3 °C)-98.3 °F (36.8 °C)] 97.4 °F (36.3 °C)  Pulse:  [56-63] 62  Resp:  [18-32] 18  SpO2:  [96 %-100 %] 99 %  BP: (105-124)/(50-58) 105/50     Weight: 93.5 kg (206 lb 2.1 oz)  Body mass index is 27.2 kg/m².       Physical Exam  Vitals and nursing note reviewed.   Constitutional:       Appearance: He is ill-appearing.   HENT:      Head: Normocephalic.   Cardiovascular:      Rate and Rhythm: Normal rate and regular rhythm.   Pulmonary:      Comments: Supplemental oxygen  Abdominal:      General: There is distension.   Musculoskeletal:      Comments: Left heel pain   Skin:     General: Skin is warm and dry.   Neurological:      Mental Status: He is alert.            Review of Symptoms      Symptom Assessment (ESAS 0-10 Scale)  Pain:  0  Dyspnea:  0  Anxiety:  0  Nausea:  0  Depression:  0  Anorexia:  0  Fatigue:  0  Insomnia:  0  Restlessness:  0  Agitation:  0     CAM / Delirium:  Negative  Constipation:  Negative  Diarrhea:  Negative      Pain Assessment:  OME in 24 hours:  0  Location(s):      Living Arrangements:  Lives  with spouse    Psychosocial/Cultural:   See Palliative Psychosocial Note: Yes  Lives with wife of 45  years, one daughter, no grandchildren, enjoyed being outside, with family and fishing   **Primary  to Follow**  Palliative Care  Consult: Yes    Spiritual:  F - Ya and Belief:  Sreedhra   A - Address in Care:  Declined  services         Advance Care Planning   Advance Directives:   Living Will: No        Oral Declaration: No    LaPOST: No    Do Not Resuscitate Status: No    Medical Power of : No        Oral Declaration: No      Decision Making:  Patient answered questions  Goals of Care: What is most important right now is to focus on spending time at home, remaining as independent as possible, symptom/pain control, quality of life, even if it means sacrificing a little time. Accordingly, we have decided that the best plan to meet the patient's goals includes continuing with treatment.         Significant Labs: All pertinent labs within the past 24 hours have been reviewed.  CBC:   Recent Labs   Lab 10/18/23  0601   WBC 7.64   HGB 7.4*   HCT 23.3*   MCV 79*   *       BMP:  Recent Labs   Lab 10/18/23  0601   GLU 87      K 4.1      CO2 22*   BUN 39*   CREATININE 5.5*   CALCIUM 7.0*   MG 2.7*       LFT:  Lab Results   Component Value Date    AST 15 10/18/2023    ALKPHOS 60 10/18/2023    BILITOT 0.3 10/18/2023     Albumin:   Albumin   Date Value Ref Range Status   10/18/2023 2.5 (L) 3.5 - 5.2 g/dL Final     Protein:   Total Protein   Date Value Ref Range Status   10/18/2023 5.7 (L) 6.0 - 8.4 g/dL Final     Lactic acid:   Lab Results   Component Value Date    LACTATE 1.3 10/16/2023    LACTATE 1.1 10/13/2023       Significant Imaging: I have reviewed all pertinent imaging results/findings within the past 24 hours.

## 2023-10-18 NOTE — SUBJECTIVE & OBJECTIVE
Interval History: patient continued having chest pain. Cr continuing to rise    Review of Systems   Constitutional:  Negative for chills, fatigue and fever.   HENT:  Negative for ear pain and sinus pain.    Eyes:  Negative for photophobia, pain and visual disturbance.   Respiratory:  Negative for chest tightness, shortness of breath and wheezing.    Cardiovascular:  Positive for chest pain. Negative for leg swelling.   Gastrointestinal:  Negative for abdominal pain, blood in stool, constipation, diarrhea, nausea and vomiting.   Endocrine: Negative for polydipsia and polyuria.   Genitourinary:  Negative for difficulty urinating, dysuria, flank pain, frequency, penile pain and testicular pain.   Musculoskeletal:  Negative for back pain, neck pain and neck stiffness.   Skin:  Negative for color change and rash.   Neurological:  Negative for dizziness, syncope, weakness, numbness and headaches.   Psychiatric/Behavioral:  Negative for confusion and sleep disturbance.      Objective:     Vital Signs (Most Recent):  Temp: 97.4 °F (36.3 °C) (10/18/23 1534)  Pulse: 62 (10/18/23 1534)  Resp: 18 (10/18/23 1534)  BP: (!) 105/50 (10/18/23 1534)  SpO2: 99 % (10/18/23 1700) Vital Signs (24h Range):  Temp:  [97.4 °F (36.3 °C)-98.3 °F (36.8 °C)] 97.4 °F (36.3 °C)  Pulse:  [56-63] 62  Resp:  [18-32] 18  SpO2:  [96 %-100 %] 99 %  BP: (105-124)/(50-58) 105/50     Weight: 93.5 kg (206 lb 2.1 oz)  Body mass index is 27.2 kg/m².    Intake/Output Summary (Last 24 hours) at 10/18/2023 1736  Last data filed at 10/17/2023 2327  Gross per 24 hour   Intake --   Output 400 ml   Net -400 ml         Physical Exam  Constitutional:       General: He is not in acute distress.     Appearance: He is not ill-appearing.   HENT:      Head: Normocephalic and atraumatic.      Right Ear: External ear normal.      Left Ear: External ear normal.      Nose: Nose normal. No congestion or rhinorrhea.      Mouth/Throat:      Mouth: Mucous membranes are dry.       Pharynx: Oropharynx is clear. No oropharyngeal exudate or posterior oropharyngeal erythema.   Eyes:      General: No scleral icterus.        Right eye: No discharge.         Left eye: No discharge.      Extraocular Movements: Extraocular movements intact.      Conjunctiva/sclera: Conjunctivae normal.   Cardiovascular:      Rate and Rhythm: Normal rate and regular rhythm.      Pulses: Normal pulses.      Heart sounds: Normal heart sounds. No murmur heard.     No gallop.   Pulmonary:      Effort: No respiratory distress.      Breath sounds: Normal breath sounds. No stridor. No wheezing or rales.   Chest:      Chest wall: No tenderness.   Abdominal:      General: Abdomen is flat. Bowel sounds are normal. There is no distension.      Palpations: Abdomen is soft. There is no mass.      Tenderness: There is no abdominal tenderness. There is no guarding or rebound.      Hernia: No hernia is present.   Musculoskeletal:         General: No swelling, tenderness or deformity. Normal range of motion.      Cervical back: Normal range of motion. No rigidity or tenderness.      Right lower leg: Edema present.      Left lower leg: Edema present.   Skin:     General: Skin is warm and dry.      Capillary Refill: Capillary refill takes less than 2 seconds.      Coloration: Skin is not jaundiced.      Findings: No bruising, erythema or rash.   Neurological:      General: No focal deficit present.      Mental Status: He is alert and oriented to person, place, and time.      Cranial Nerves: No cranial nerve deficit.      Sensory: No sensory deficit.      Motor: No weakness.   Psychiatric:         Mood and Affect: Mood normal.         Behavior: Behavior normal.         Thought Content: Thought content normal.         Judgment: Judgment normal.             Significant Labs: All pertinent labs within the past 24 hours have been reviewed.    Significant Imaging: I have reviewed all pertinent imaging results/findings within the past 24  hours.

## 2023-10-18 NOTE — SUBJECTIVE & OBJECTIVE
"Interval History:       Past Medical History:   Diagnosis Date    Atrial fibrillation     CAD (coronary artery disease) 2006    MI in 2006    CHF (congestive heart failure) 2017    CKD (chronic kidney disease) stage 3, GFR 30-59 ml/min     Dr. Latif.  in transplanted kidney    COVID-19 2/7/2023    Diverticulosis     Hyperlipidemia     Hypertension     Keloid cicatrix     Metabolic bone disease     Other emphysema 10/1/2019    Pericarditis     S/P kidney transplant 1992    x2 (1992 & 2005),    Thrombocytopenia     Thyroid disease     Tobacco use 09/05/2014       Past Surgical History:   Procedure Laterality Date    CARDIOVERSION  04/30/15    CHOLECYSTECTOMY      COLONOSCOPY  April 20, 2011    Diverticulosis, repeat recommended in 3 yrs., repeat colonoscopy 2014 revealed 2 polyps.  He should return in 5 years.    COLONOSCOPY N/A 3/13/2020    Procedure: COLONOSCOPY;  Surgeon: Chon Casper MD;  Location: [facility] (4TH FLR);  Service: Endoscopy;  Laterality: N/A;  ok to hold coumadin x5days-see telephone encounter 2/4/20-tb    COLONOSCOPY N/A 2/4/2021    Procedure: COLONOSCOPY;  Surgeon: Chon Casper MD;  Location: [facility] (4TH FLR);  Service: Endoscopy;  Laterality: N/A;  Eliquis - per Dr. GAVIN Blunt ok to hold x 2 days and "restarted asap"- ERW  last prep "poor", [id number] ordered/ Pt requests Dr. Casper  prep ins. mailed - COVID screening on 2/1/21 PCW- ERW    COLONOSCOPY N/A 3/3/2021    Procedure: COLONOSCOPY;  Surgeon: Chon Casper MD;  Location: [facility] (4TH FLR);  Service: Endoscopy;  Laterality: N/A;  Patient with his second poor bowel pre and has poor prep today.  If patient not intersted or can't get colonoscopy tomorrow will need constipation bowel prep and will need to restart his Eliquis today.Thanks,Chon Blunt-ok to hold Eliquis 2 days prior      COVID     CORONARY ANGIOPLASTY WITH STENT PLACEMENT  9/13/2006    IRRIGATION AND DEBRIDEMENT Right 8/30/2023    Procedure: " IRRIGATION AND DEBRIDEMENT, RIGHT MIDDLE FINGER;  Surgeon: Martin Larsen MD;  Location: [facility] FLR;  Service: Orthopedics;  Laterality: Right;    KIDNEY TRANSPLANT  2005    PARATHYROIDECTOMY      TREATMENT OF CARDIAC ARRHYTHMIA N/A 3/28/2022    Procedure: CARDIOVERSION;  Surgeon: Migue Blunt MD;  Location: [facility] EP LAB;  Service: Cardiology;  Laterality: N/A;  AF, DCC, MAC, GP, 3 PREP*RODRIGO deferred pt 100% compliant*       Review of patient's allergies indicates:   Allergen Reactions    Ace inhibitors Swelling    Verapamil Other (See Comments)     Other reaction(s): Unknown    Povidone-iodine Itching       Medications:  Continuous Infusions:   heparin (porcine) in D5W 12 Units/kg/hr (10/17/23 1356)     Scheduled Meds:   amiodarone  200 mg Oral Daily    apixaban  5 mg Oral BID    [START ON 10/19/2023] atorvastatin  40 mg Oral Daily    calcium carbonate  1,000 mg Oral Once    clopidogreL  75 mg Oral Daily    doxazosin  4 mg Oral Q12H    ferrous sulfate  1 tablet Oral Daily    hydrALAZINE  25 mg Oral Q8H    [START ON 10/19/2023] isosorbide mononitrate  60 mg Oral Daily    metoprolol succinate  25 mg Oral Daily    pantoprazole  40 mg Oral Daily    predniSONE  5 mg Oral Daily    sodium bicarbonate  650 mg Oral BID    sodium chloride  1 spray Each Nostril Q4H    tacrolimus  3 mg Oral BID     PRN Meds:0.9%  NaCl infusion (for blood administration), 0.9%  NaCl infusion (for blood administration), acetaminophen, dextrose 10%, dextrose 10%, glucagon (human recombinant), glucose, glucose, heparin (PORCINE), heparin (PORCINE), ipratropium, melatonin, methocarbamoL, naloxone, nitroGLYCERIN, ondansetron, prochlorperazine, sodium chloride 0.9%    Family History       Problem Relation (Age of Onset)    Heart disease Father    Kidney disease Sister, Brother    Kidney failure Sister, Brother    No Known Problems Sister, Brother, Sister, Sister    Thyroid disease Mother          Tobacco Use    Smoking status: Former      Current packs/day: 0.00     Average packs/day: 0.5 packs/day for 40.0 years (20.0 ttl pk-yrs)     Types: Cigarettes     Start date: 1982     Quit date: 2022     Years since quittin.6    Smokeless tobacco: Never    Tobacco comments:     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     Patient is currently retired   Substance and Sexual Activity    Alcohol use: No    Drug use: No    Sexual activity: Yes     Partners: Female       Review of Systems   Constitutional:  Positive for activity change.   Respiratory:  Positive for cough and shortness of breath.    Cardiovascular:  Positive for leg swelling.   Gastrointestinal:  Positive for abdominal distention.   Neurological:  Positive for weakness.     Objective:     Vital Signs (Most Recent):  Temp: 97.4 °F (36.3 °C) (10/18/23 1534)  Pulse: 62 (10/18/23 1534)  Resp: 18 (10/18/23 1534)  BP: (!) 105/50 (10/18/23 1534)  SpO2: 99 % (10/18/23 1534) Vital Signs (24h Range):  Temp:  [97.4 °F (36.3 °C)-98.3 °F (36.8 °C)] 97.4 °F (36.3 °C)  Pulse:  [56-63] 62  Resp:  [18-32] 18  SpO2:  [96 %-100 %] 99 %  BP: (105-124)/(50-58) 105/50     Weight: 93.5 kg (206 lb 2.1 oz)  Body mass index is 27.2 kg/m².       Physical Exam  Vitals and nursing note reviewed.   Constitutional:       Appearance: He is ill-appearing.   HENT:      Head: Normocephalic.   Cardiovascular:      Rate and Rhythm: Normal rate and regular rhythm.   Pulmonary:      Comments: Supplemental oxygen  Abdominal:      General: There is distension.   Musculoskeletal:      Comments: Left heel pain   Skin:     General: Skin is warm and dry.   Neurological:      Mental Status: He is alert.            Review of Symptoms      Symptom Assessment (ESAS 0-10 Scale)  Pain:  0  Dyspnea:  0  Anxiety:  0  Nausea:  0  Depression:  0  Anorexia:  0  Fatigue:  0  Insomnia:  0  Restlessness:  0  Agitation:  0     CAM / Delirium:  Negative  Constipation:  Negative  Diarrhea:   Negative      Pain Assessment:  OME in 24 hours:  0  Location(s):      Living Arrangements:  Lives with spouse    Psychosocial/Cultural:   See Palliative Psychosocial Note: Yes  Lives with wife of 45  years, one daughter, no grandchildren, enjoyed being outside, with family and fishing   **Primary  to Follow**  Palliative Care  Consult: Yes    Spiritual:  F - Ya and Belief:  Sreedhar   A - Address in Care:  Declined  services         Advance Care Planning   Advance Directives:   Living Will: No        Oral Declaration: No    LaPOST: No    Do Not Resuscitate Status: No    Medical Power of : No        Oral Declaration: No      Decision Making:  Patient answered questions  Goals of Care: What is most important right now is to focus on spending time at home, remaining as independent as possible, symptom/pain control, quality of life, even if it means sacrificing a little time. Accordingly, we have decided that the best plan to meet the patient's goals includes continuing with treatment.         Significant Labs: All pertinent labs within the past 24 hours have been reviewed.  CBC:   Recent Labs   Lab 10/18/23  0601   WBC 7.64   HGB 7.4*   HCT 23.3*   MCV 79*   *     BMP:  Recent Labs   Lab 10/18/23  0601   GLU 87      K 4.1      CO2 22*   BUN 39*   CREATININE 5.5*   CALCIUM 7.0*   MG 2.7*     LFT:  Lab Results   Component Value Date    AST 15 10/18/2023    ALKPHOS 60 10/18/2023    BILITOT 0.3 10/18/2023     Albumin:   Albumin   Date Value Ref Range Status   10/18/2023 2.5 (L) 3.5 - 5.2 g/dL Final     Protein:   Total Protein   Date Value Ref Range Status   10/18/2023 5.7 (L) 6.0 - 8.4 g/dL Final     Lactic acid:   Lab Results   Component Value Date    LACTATE 1.3 10/16/2023    LACTATE 1.1 10/13/2023       Significant Imaging: I have reviewed all pertinent imaging results/findings within the past 24 hours.

## 2023-10-18 NOTE — ASSESSMENT & PLAN NOTE
Palliative care follow up to  goals of care and advanced care planning as discussed with primary team resident, for Mr. Ibrahima Phelps. Mr. Phelps known to pal med from previous admissions.  He is awake alert and oriented x3, reports no pain or discomforts.  Admitted to hospital medicine with chief complaint of unstable angina.       Advance Care Planning   - No ACP documents received  - Mr. Phelps appears able to make medical decisions   - next of kin is wife Patti Phelps [phone]   - full code per primary team = Mr. Phelps reports no change in resuscitation status and he is aware of risks and his survivability to cpr      Goals of Care  - pal med APRN and LCSW  met with patient and wife at bedside   -  states understanding his current clinical condition and reports no change in previous established goals of care.   - - Reports the heparin drip will be discontinued and he will start of lasix again.   - Hopeful the lasix will give his kidney the boost he needs.  -  States he gets his strength through his luis a in God and support from his family - daughter and his wife.   Mr. Phelps freely accepts his fate as this is God's plan.   -   has not be amenable to HD and confirms his wish is to  continue medical management   -   emotional support provided     Symptom Management   - no c/o pain,nausea  - mild shortness of breath occasionally on exertion   - room air oxygen saturations 97 - 100%    Unstable angina pectoris/ CKD (chronic kidney disease), stage IV  Managed per primary team and specialty consultants   - no c/o chest pain at this time   - heparin drip in progress for 48 hrs - ACS protocol nearing end   - Cr. Trending upward 5.7 today will restart lasix   - [facility] following for goals of care and advanced care planning - see above       PlanRecommendations   - continue current plan of care   - amenable to pal med follow up - referral sent   - patient is not amenable to seeing hospital  and  encouraged him to talk more with his    - emotional support provided      Dr. Judge primary team attending and resident notified of the above.       Thank you for consult and opportunity to participate in Mr. Phelps's care      Thank you for consult and opportunity

## 2023-10-18 NOTE — CONSULTS
[facility] - Telemetry Stepdown  Palliative Medicine  Consult Note    Patient Name: Ibrahima Phelps  MRN: [id number]  Admission Date: 10/13/2023  Hospital Length of Stay: 5 days  Code Status: Full Code   Attending Provider: Ricky Judge MD  Consulting Provider: TRUDI Stone  Primary Care Physician: Anatoliy Colindres MD  Principal Problem:Unstable angina pectoris    Patient information was obtained from patient, spouse/SO and primary team.      Consults  Assessment/Plan:     Palliative Care  Palliative care encounter  Palliative care consulted for goals of care and advanced care planning as discussed with primary team resident, for Mr. Ibrahima Phelps. Mr. Phelps known to pal med from previous admissions.  He is awake alert and oriented x3, reports no pain or discomforts.  Admitted to hospital medicine with chief complaint of unstable angina.       Advance Care Planning   - No ACP documents received  - Mr. Phelps appears able to make medical decisions   - next of kin is wife Patti Phelps [phone]   - full code per primary team      Goals of Care  - pal med met with patient and wife at bedside   - pal med introduced.  Patient is amenable to pal med.  -  states understanding his current clinical condition and has stopped taking his Cellcept at least a year ago.  He states knowing he will not get another transplant.   - understands he needs a heart cath to evaluate his heart function and is aware of risks associated with contrast on his kidney function. Today he states he will not have the heart cath.  He has made decision to proceed with medical management  - He is hoping he will get well enough to go home and return to his baseline.   - state this is God's plan and he freely accepts this.   - briefly discussed resuscitation status and he is not amenable to conversation today.    - emotional support provided     Symptom Management   - no c/o pain,nausea  - mild shortness of breath occasionally on exertion  "  - room air oxygen saturations 97 - 100%    Heart failure with reserved ejection fraction/Unstable angina pectoris/ CKD (chronic kidney disease), stage IV/ HO transplanted kidney   Managed per primary team and specialty consultants   - no c/o chest pain at this time   - heparin drip in progress for 48 hrs  - Cr. Today was 4.1, baseline around 3.7-4  - Pal med following for goals of care and advanced care planning - see above       PlanRecommendations   - continue current plan of care   - amenable to pal med follow up - will facilitate referral   - emotional support provided      Dr. Judge primary team attending and resident notified of the above.       Thank you for consult and opportunity to participate in Mr. Phelps's care      Thank you for consult and opportunity         Thank you for your consult. I will follow-up with patient. Please contact us if you have any additional questions.    Subjective:     HPI:   HPI obtained from chart review: 68 yo M active smoker w/ COPD, CAD s/p CABG x1 in 2006, paroxysmal Afib s/p cardioversion 3/2022 (on Eliquis, Metoprolol, and Amiodarone), HFpEF (EF 65%; not currently taking diuretics), hiatal hernia with reflux, HTN on Hydralazine, CKD4 s/p kidney transplants in 1992 and 2005 on Tacrolimus and Prednisone, presented to the ED with his wife complaining about worsening shortness of breath/DAUGHERTY; infrequent, nonproductive cough; and severe, sharp L posterior heel pain that started a few days ago. Per the patient, he woke up last night "gasping for air," short of breath, and with substernal chest pain radiating to his back. The chest pain resolved with Omeprazole; denies chest pain at this time. The shortness of breath and infrequent, nonproductive cough typically occur at rest and are worse with walking. The severe, sharp L posterior heel pain is worse with movement and walking, and has not responded to leg elevation. Of note, he has a history of tenosynovitis. He noted " significant, watery diarrhea associated with stomach pain all day yesterday that resolved with Peptobismol and water; denies diarrhea this morning. Denies fevers, chills, nausea, vomiting, constipation, dysuria, hematuria, palpitations, headache, or vision changes.      His presenting vital signs were a temperature of 98.3, pulse rate of 57-84 bpm, resp rate of 20-28, blood pressure ranging from 156-169/69-74, MAP of 104-107, and SpO2 % on RA. Troponin (0.081), Magnesium (nml), CBC (Hgb 8.2, platelets 127), CMP (CO2 19, BUN 33, Cr 4.1), and BNP (1,168) were ordered. CXR showed RLL consolidation. XR L ankle showed calcaneal enthesopathy. CTAP showed trace bilateral pleural effusions, bibasilar dependent pulmonary opacities, new prominence/thickening of the L iliac fossa transplant kidney's ureter. Started on CTX, AZT, and Lasix              Hospital Course:  No notes on file    Interval History:       Past Medical History:   Diagnosis Date    Atrial fibrillation     CAD (coronary artery disease) 2006    MI in 2006    CHF (congestive heart failure) 2017    CKD (chronic kidney disease) stage 3, GFR 30-59 ml/min     Dr. Latif.  in transplanted kidney    COVID-19 2/7/2023    Diverticulosis     Hyperlipidemia     Hypertension     Keloid cicatrix     Metabolic bone disease     Other emphysema 10/1/2019    Pericarditis     S/P kidney transplant 1992    x2 (1992 & 2005),    Thrombocytopenia     Thyroid disease     Tobacco use 09/05/2014       Past Surgical History:   Procedure Laterality Date    CARDIOVERSION  04/30/15    CHOLECYSTECTOMY      COLONOSCOPY  April 20, 2011    Diverticulosis, repeat recommended in 3 yrs., repeat colonoscopy 2014 revealed 2 polyps.  He should return in 5 years.    COLONOSCOPY N/A 3/13/2020    Procedure: COLONOSCOPY;  Surgeon: Chon Casper MD;  Location: [facility] ([address] [place] [address]);  Service: Endoscopy;  Laterality: N/A;  ok to hold coumadin x5days-see telephone encounter 2/4/20-tb     "COLONOSCOPY N/A 2/4/2021    Procedure: COLONOSCOPY;  Surgeon: Chon Casper MD;  Location: [facility] (4TH FLR);  Service: Endoscopy;  Laterality: N/A;  Eliquis - per Dr. GAVIN chua to hold x 2 days and "restarted asap"- ERW  last prep "poor", [id number] ordered/ Pt requests Dr. Casper  prep ins. mailed - COVID screening on 2/1/21 PCW- ERW    COLONOSCOPY N/A 3/3/2021    Procedure: COLONOSCOPY;  Surgeon: Chon Casper MD;  Location: [facility] ENDO (4TH FLR);  Service: Endoscopy;  Laterality: N/A;  Patient with his second poor bowel pre and has poor prep today.  If patient not intersted or can't get colonoscopy tomorrow will need constipation bowel prep and will need to restart his Eliquis today.Thanks,Chon Lopez to hold Eliquis 2 days prior      COVID     CORONARY ANGIOPLASTY WITH STENT PLACEMENT  9/13/2006    IRRIGATION AND DEBRIDEMENT Right 8/30/2023    Procedure: IRRIGATION AND DEBRIDEMENT, RIGHT MIDDLE FINGER;  Surgeon: Martin Larsen MD;  Location: [facility] OR 2ND FLR;  Service: Orthopedics;  Laterality: Right;    KIDNEY TRANSPLANT  2005    PARATHYROIDECTOMY      TREATMENT OF CARDIAC ARRHYTHMIA N/A 3/28/2022    Procedure: CARDIOVERSION;  Surgeon: Migue Blunt MD;  Location: [facility] EP LAB;  Service: Cardiology;  Laterality: N/A;  AF, DCC, MAC, GP, 3 PREP*RODRIGO deferred pt 100% compliant*       Review of patient's allergies indicates:   Allergen Reactions    Ace inhibitors Swelling    Verapamil Other (See Comments)     Other reaction(s): Unknown    Povidone-iodine Itching       Medications:  Continuous Infusions:   heparin (porcine) in D5W 12 Units/kg/hr (10/17/23 1356)     Scheduled Meds:   amiodarone  200 mg Oral Daily    apixaban  5 mg Oral BID    [START ON 10/19/2023] atorvastatin  40 mg Oral Daily    calcium carbonate  1,000 mg Oral Once    clopidogreL  75 mg Oral Daily    doxazosin  4 mg Oral Q12H    ferrous sulfate  1 tablet Oral Daily    hydrALAZINE  25 mg Oral Q8H    [START ON " 10/19/2023] isosorbide mononitrate  60 mg Oral Daily    metoprolol succinate  25 mg Oral Daily    pantoprazole  40 mg Oral Daily    predniSONE  5 mg Oral Daily    sodium bicarbonate  650 mg Oral BID    sodium chloride  1 spray Each Nostril Q4H    tacrolimus  3 mg Oral BID     PRN Meds:0.9%  NaCl infusion (for blood administration), 0.9%  NaCl infusion (for blood administration), acetaminophen, dextrose 10%, dextrose 10%, glucagon (human recombinant), glucose, glucose, heparin (PORCINE), heparin (PORCINE), ipratropium, melatonin, methocarbamoL, naloxone, nitroGLYCERIN, ondansetron, prochlorperazine, sodium chloride 0.9%    Family History       Problem Relation (Age of Onset)    Heart disease Father    Kidney disease Sister, Brother    Kidney failure Sister, Brother    No Known Problems Sister, Brother, Sister, Sister    Thyroid disease Mother          Tobacco Use    Smoking status: Former     Current packs/day: 0.00     Average packs/day: 0.5 packs/day for 40.0 years (20.0 ttl pk-yrs)     Types: Cigarettes     Start date: 1982     Quit date: 2022     Years since quittin.6    Smokeless tobacco: Never    Tobacco comments:     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     Patient is currently retired   Substance and Sexual Activity    Alcohol use: No    Drug use: No    Sexual activity: Yes     Partners: Female       Review of Systems   Constitutional:  Positive for activity change.   Respiratory:  Positive for cough and shortness of breath.    Cardiovascular:  Positive for leg swelling.   Gastrointestinal:  Positive for abdominal distention.   Neurological:  Positive for weakness.     Objective:     Vital Signs (Most Recent):  Temp: 97.4 °F (36.3 °C) (10/18/23 1534)  Pulse: 62 (10/18/23 1534)  Resp: 18 (10/18/23 153)  BP: (!) 105/50 (10/18/23 1534)  SpO2: 99 % (10/18/23 153) Vital Signs (24h Range):  Temp:  [97.4 °F (36.3 °C)-98.3 °F (36.8 °C)] 97.4 °F (36.3  °C)  Pulse:  [56-63] 62  Resp:  [18-32] 18  SpO2:  [96 %-100 %] 99 %  BP: (105-124)/(50-58) 105/50     Weight: 93.5 kg (206 lb 2.1 oz)  Body mass index is 27.2 kg/m².       Physical Exam  Vitals and nursing note reviewed.   Constitutional:       Appearance: He is ill-appearing.   HENT:      Head: Normocephalic.   Cardiovascular:      Rate and Rhythm: Normal rate and regular rhythm.   Pulmonary:      Comments: Supplemental oxygen  Abdominal:      General: There is distension.   Musculoskeletal:      Comments: Left heel pain   Skin:     General: Skin is warm and dry.   Neurological:      Mental Status: He is alert.            Review of Symptoms      Symptom Assessment (ESAS 0-10 Scale)  Pain:  0  Dyspnea:  0  Anxiety:  0  Nausea:  0  Depression:  0  Anorexia:  0  Fatigue:  0  Insomnia:  0  Restlessness:  0  Agitation:  0     CAM / Delirium:  Negative  Constipation:  Negative  Diarrhea:  Negative      Pain Assessment:  OME in 24 hours:  0  Location(s):      Living Arrangements:  Lives with spouse    Psychosocial/Cultural:   See Palliative Psychosocial Note: Yes  Lives with wife of 45  years, one daughter, no grandchildren, enjoyed being outside, with family and fishing   **Primary  to Follow**  Palliative Care  Consult: Yes    Spiritual:  F - Ya and Belief:  Sreedhar   A - Address in Care:  Declined  services         Advance Care Planning  Advance Directives:   Living Will: No        Oral Declaration: No    LaPOST: No    Do Not Resuscitate Status: No    Medical Power of : No        Oral Declaration: No      Decision Making:  Patient answered questions  Goals of Care: What is most important right now is to focus on spending time at home, remaining as independent as possible, symptom/pain control, quality of life, even if it means sacrificing a little time. Accordingly, we have decided that the best plan to meet the patient's goals includes continuing with treatment.          Significant Labs: All pertinent labs within the past 24 hours have been reviewed.  CBC:   Recent Labs   Lab 10/18/23  0601   WBC 7.64   HGB 7.4*   HCT 23.3*   MCV 79*   *     BMP:  Recent Labs   Lab 10/18/23  0601   GLU 87      K 4.1      CO2 22*   BUN 39*   CREATININE 5.5*   CALCIUM 7.0*   MG 2.7*     LFT:  Lab Results   Component Value Date    AST 15 10/18/2023    ALKPHOS 60 10/18/2023    BILITOT 0.3 10/18/2023     Albumin:   Albumin   Date Value Ref Range Status   10/18/2023 2.5 (L) 3.5 - 5.2 g/dL Final     Protein:   Total Protein   Date Value Ref Range Status   10/18/2023 5.7 (L) 6.0 - 8.4 g/dL Final     Lactic acid:   Lab Results   Component Value Date    LACTATE 1.3 10/16/2023    LACTATE 1.1 10/13/2023       Significant Imaging: I have reviewed all pertinent imaging results/findings within the past 24 hours.    20 mins spent in advanced care planning      Radha Jacobs, CNS  Palliative Medicine  [facility] - Telemetry Stepdown

## 2023-10-18 NOTE — PLAN OF CARE
Problem: Adult Inpatient Plan of Care  Goal: Plan of Care Review  Outcome: Ongoing, Progressing  Goal: Patient-Specific Goal (Individualized)  Outcome: Ongoing, Progressing  Goal: Absence of Hospital-Acquired Illness or Injury  Outcome: Ongoing, Progressing  Goal: Optimal Comfort and Wellbeing  Outcome: Ongoing, Progressing  Goal: Readiness for Transition of Care  Outcome: Ongoing, Progressing     Problem: Fluid and Electrolyte Imbalance (Acute Kidney Injury/Impairment)  Goal: Fluid and Electrolyte Balance  Outcome: Ongoing, Progressing     Problem: Oral Intake Inadequate (Acute Kidney Injury/Impairment)  Goal: Optimal Nutrition Intake  Outcome: Ongoing, Progressing     Problem: Renal Function Impairment (Acute Kidney Injury/Impairment)  Goal: Effective Renal Function  Outcome: Ongoing, Progressing     Problem: Pain Acute  Goal: Acceptable Pain Control and Functional Ability  Outcome: Ongoing, Progressing     Problem: Coping Ineffective  Goal: Effective Coping  Outcome: Ongoing, Progressing     Problem: Fall Injury Risk  Goal: Absence of Fall and Fall-Related Injury  Outcome: Ongoing, Progressing     Pt AAOx4. Denies pain/discomfort. Routine meds administered. Safety measures maintained. Wife a bedside. Monitoring ongoing.

## 2023-10-18 NOTE — PLAN OF CARE
Advance Care Planning   [facility] - Telemetry Stepdown  Palliative Care       Patient Name: Ibrahima Phelps  MRN: [id number]  Admission Date: 10/13/2023  Hospital Length of Stay: 5 days  Code Status: Full Code   Attending Provider: Ricky Judge MD  Palliative Care Provider: TRUDI Sanchez   Primary Care Physician: Anatoliy Colindres MD  Principal Problem:Unstable angina pectoris    Palliative provider and this  met with patient in room. Patient's wife at bedside. Patient stated his hope is for his kidney to heal. Patient stated getting strength from his luis a and his daughter. Patient otherwise in good spirits.  will follow for support.     Lavon Lubin LCSW  Palliative Medicine

## 2023-10-19 LAB
ALBUMIN SERPL BCP-MCNC: 2.7 G/DL (ref 3.5–5.2)
ALP SERPL-CCNC: 69 U/L (ref 55–135)
ALT SERPL W/O P-5'-P-CCNC: 13 U/L (ref 10–44)
ANION GAP SERPL CALC-SCNC: 8 MMOL/L (ref 8–16)
AST SERPL-CCNC: 17 U/L (ref 10–40)
BASOPHILS # BLD AUTO: 0.02 K/UL (ref 0–0.2)
BASOPHILS NFR BLD: 0.2 % (ref 0–1.9)
BILIRUB SERPL-MCNC: 0.8 MG/DL (ref 0.1–1)
BUN SERPL-MCNC: 46 MG/DL (ref 8–23)
CALCIUM SERPL-MCNC: 7 MG/DL (ref 8.7–10.5)
CHLORIDE SERPL-SCNC: 106 MMOL/L (ref 95–110)
CO2 SERPL-SCNC: 23 MMOL/L (ref 23–29)
CREAT SERPL-MCNC: 5.6 MG/DL (ref 0.5–1.4)
CREAT UR-MCNC: 168 MG/DL (ref 23–375)
DIFFERENTIAL METHOD: ABNORMAL
EOSINOPHIL # BLD AUTO: 0.4 K/UL (ref 0–0.5)
EOSINOPHIL NFR BLD: 4.5 % (ref 0–8)
ERYTHROCYTE [DISTWIDTH] IN BLOOD BY AUTOMATED COUNT: 19.1 % (ref 11.5–14.5)
EST. GFR  (NO RACE VARIABLE): 10.4 ML/MIN/1.73 M^2
GLUCOSE SERPL-MCNC: 85 MG/DL (ref 70–110)
HCT VFR BLD AUTO: 26.2 % (ref 40–54)
HGB BLD-MCNC: 8.1 G/DL (ref 14–18)
IMM GRANULOCYTES # BLD AUTO: 0.05 K/UL (ref 0–0.04)
IMM GRANULOCYTES NFR BLD AUTO: 0.6 % (ref 0–0.5)
LYMPHOCYTES # BLD AUTO: 1.6 K/UL (ref 1–4.8)
LYMPHOCYTES NFR BLD: 19.1 % (ref 18–48)
MAGNESIUM SERPL-MCNC: 3.1 MG/DL (ref 1.6–2.6)
MCH RBC QN AUTO: 24.3 PG (ref 27–31)
MCHC RBC AUTO-ENTMCNC: 30.9 G/DL (ref 32–36)
MCV RBC AUTO: 78 FL (ref 82–98)
MONOCYTES # BLD AUTO: 0.7 K/UL (ref 0.3–1)
MONOCYTES NFR BLD: 8 % (ref 4–15)
NEUTROPHILS # BLD AUTO: 5.8 K/UL (ref 1.8–7.7)
NEUTROPHILS NFR BLD: 67.6 % (ref 38–73)
NRBC BLD-RTO: 0 /100 WBC
PHOSPHATE SERPL-MCNC: 4.4 MG/DL (ref 2.7–4.5)
PLATELET # BLD AUTO: 158 K/UL (ref 150–450)
PMV BLD AUTO: 11.9 FL (ref 9.2–12.9)
POTASSIUM SERPL-SCNC: 4.5 MMOL/L (ref 3.5–5.1)
PROT SERPL-MCNC: 6.1 G/DL (ref 6–8.4)
RBC # BLD AUTO: 3.34 M/UL (ref 4.6–6.2)
SODIUM SERPL-SCNC: 137 MMOL/L (ref 136–145)
SODIUM UR-SCNC: <10 MMOL/L (ref 20–250)
TACROLIMUS BLD-MCNC: 4.4 NG/ML (ref 5–15)
UUN UR-MCNC: 390 MG/DL (ref 140–1050)
WBC # BLD AUTO: 8.58 K/UL (ref 3.9–12.7)

## 2023-10-19 PROCEDURE — 85025 COMPLETE CBC W/AUTO DIFF WBC: CPT

## 2023-10-19 PROCEDURE — 82570 ASSAY OF URINE CREATININE: CPT | Performed by: INTERNAL MEDICINE

## 2023-10-19 PROCEDURE — 80197 ASSAY OF TACROLIMUS: CPT | Performed by: INTERNAL MEDICINE

## 2023-10-19 PROCEDURE — 84540 ASSAY OF URINE/UREA-N: CPT | Performed by: INTERNAL MEDICINE

## 2023-10-19 PROCEDURE — 25000003 PHARM REV CODE 250

## 2023-10-19 PROCEDURE — 99233 PR SUBSEQUENT HOSPITAL CARE,LEVL III: ICD-10-PCS | Mod: GC,,, | Performed by: STUDENT IN AN ORGANIZED HEALTH CARE EDUCATION/TRAINING PROGRAM

## 2023-10-19 PROCEDURE — 99233 PR SUBSEQUENT HOSPITAL CARE,LEVL III: ICD-10-PCS | Mod: ,,, | Performed by: INTERNAL MEDICINE

## 2023-10-19 PROCEDURE — 80053 COMPREHEN METABOLIC PANEL: CPT

## 2023-10-19 PROCEDURE — 63600175 PHARM REV CODE 636 W HCPCS: Performed by: INTERNAL MEDICINE

## 2023-10-19 PROCEDURE — 63600175 PHARM REV CODE 636 W HCPCS

## 2023-10-19 PROCEDURE — 25000003 PHARM REV CODE 250: Performed by: STUDENT IN AN ORGANIZED HEALTH CARE EDUCATION/TRAINING PROGRAM

## 2023-10-19 PROCEDURE — 83735 ASSAY OF MAGNESIUM: CPT

## 2023-10-19 PROCEDURE — 25000003 PHARM REV CODE 250: Performed by: INTERNAL MEDICINE

## 2023-10-19 PROCEDURE — 84300 ASSAY OF URINE SODIUM: CPT | Performed by: INTERNAL MEDICINE

## 2023-10-19 PROCEDURE — 99233 SBSQ HOSP IP/OBS HIGH 50: CPT | Mod: GC,,, | Performed by: STUDENT IN AN ORGANIZED HEALTH CARE EDUCATION/TRAINING PROGRAM

## 2023-10-19 PROCEDURE — 36415 COLL VENOUS BLD VENIPUNCTURE: CPT | Performed by: INTERNAL MEDICINE

## 2023-10-19 PROCEDURE — 20600001 HC STEP DOWN PRIVATE ROOM

## 2023-10-19 PROCEDURE — 99233 SBSQ HOSP IP/OBS HIGH 50: CPT | Mod: ,,, | Performed by: INTERNAL MEDICINE

## 2023-10-19 PROCEDURE — 84100 ASSAY OF PHOSPHORUS: CPT

## 2023-10-19 RX ADMIN — NASAL 1 SPRAY: 6.5 SPRAY NASAL at 04:10

## 2023-10-19 RX ADMIN — PANTOPRAZOLE SODIUM 40 MG: 40 TABLET, DELAYED RELEASE ORAL at 08:10

## 2023-10-19 RX ADMIN — SODIUM BICARBONATE 650 MG TABLET 650 MG: at 08:10

## 2023-10-19 RX ADMIN — FERROUS SULFATE TAB EC 325 MG (65 MG FE EQUIVALENT) 1 EACH: 325 (65 FE) TABLET DELAYED RESPONSE at 08:10

## 2023-10-19 RX ADMIN — HYDRALAZINE HYDROCHLORIDE 25 MG: 25 TABLET, FILM COATED ORAL at 05:10

## 2023-10-19 RX ADMIN — DOXAZOSIN 4 MG: 2 TABLET ORAL at 08:10

## 2023-10-19 RX ADMIN — CLOPIDOGREL BISULFATE 75 MG: 75 TABLET ORAL at 08:10

## 2023-10-19 RX ADMIN — TACROLIMUS 3 MG: 1 CAPSULE ORAL at 08:10

## 2023-10-19 RX ADMIN — NASAL 1 SPRAY: 6.5 SPRAY NASAL at 09:10

## 2023-10-19 RX ADMIN — APIXABAN 5 MG: 5 TABLET, FILM COATED ORAL at 09:10

## 2023-10-19 RX ADMIN — APIXABAN 5 MG: 5 TABLET, FILM COATED ORAL at 08:10

## 2023-10-19 RX ADMIN — METOPROLOL SUCCINATE 25 MG: 25 TABLET, EXTENDED RELEASE ORAL at 08:10

## 2023-10-19 RX ADMIN — PREDNISONE 5 MG: 5 TABLET ORAL at 08:10

## 2023-10-19 RX ADMIN — Medication 6 MG: at 09:10

## 2023-10-19 RX ADMIN — DOXAZOSIN 4 MG: 2 TABLET ORAL at 09:10

## 2023-10-19 RX ADMIN — AMIODARONE HYDROCHLORIDE 200 MG: 200 TABLET ORAL at 08:10

## 2023-10-19 RX ADMIN — HYDRALAZINE HYDROCHLORIDE 25 MG: 25 TABLET, FILM COATED ORAL at 04:10

## 2023-10-19 RX ADMIN — HYDRALAZINE HYDROCHLORIDE 25 MG: 25 TABLET, FILM COATED ORAL at 09:10

## 2023-10-19 RX ADMIN — ATORVASTATIN CALCIUM 40 MG: 40 TABLET, FILM COATED ORAL at 08:10

## 2023-10-19 RX ADMIN — ISOSORBIDE MONONITRATE 60 MG: 60 TABLET, EXTENDED RELEASE ORAL at 08:10

## 2023-10-19 RX ADMIN — SODIUM BICARBONATE 650 MG TABLET 650 MG: at 09:10

## 2023-10-19 NOTE — ASSESSMENT & PLAN NOTE
Cr. Today was 5.5, baseline around 3.7-4. Managed by nephrology outpatient    - KTM consulted, appreciate recommendations  - avoid nephrotoxic drugs and renally dose medications  - strict I/Os

## 2023-10-19 NOTE — ASSESSMENT & PLAN NOTE
CABG in 2005    - Discontinue ASA and continue Statin  - Slight troponin elevation on admission, will trend

## 2023-10-19 NOTE — ASSESSMENT & PLAN NOTE
Hold apixaban with plans to start heparin infusion  Heparin infusion stopped on 10/18, resumed apixaban

## 2023-10-19 NOTE — SUBJECTIVE & OBJECTIVE
Interval History: No new episodes of chest pain. Pt off heparin drip and transitioned back to eliquis. Making more urine output today. Awaiting input from KTM on whether or not to diurese as Cr is still elevated though with a minimal delta.Patient otherwise doing well and appears in good spirits this morning.  Patient doing better this morning.    Review of Systems   Constitutional:  Negative for chills, fatigue and fever.   HENT:  Negative for ear pain and sinus pain.    Eyes:  Negative for photophobia, pain and visual disturbance.   Respiratory:  Negative for chest tightness, shortness of breath and wheezing.    Cardiovascular:  Positive for chest pain. Negative for leg swelling.   Gastrointestinal:  Negative for abdominal pain, blood in stool, constipation, diarrhea, nausea and vomiting.   Endocrine: Negative for polydipsia and polyuria.   Genitourinary:  Negative for difficulty urinating, dysuria, flank pain, frequency, penile pain and testicular pain.   Musculoskeletal:  Negative for back pain, neck pain and neck stiffness.   Skin:  Negative for color change and rash.   Neurological:  Negative for dizziness, syncope, weakness, numbness and headaches.   Psychiatric/Behavioral:  Negative for confusion and sleep disturbance.      Objective:     Vital Signs (Most Recent):  Temp: 97.6 °F (36.4 °C) (10/19/23 1112)  Pulse: (!) 58 (10/19/23 1112)  Resp: 18 (10/19/23 1112)  BP: 138/75 (10/19/23 1112)  SpO2: 99 % (10/19/23 1112) Vital Signs (24h Range):  Temp:  [97 °F (36.1 °C)-99.4 °F (37.4 °C)] 97.6 °F (36.4 °C)  Pulse:  [58-66] 58  Resp:  [18-21] 18  SpO2:  [98 %-99 %] 99 %  BP: (105-138)/(50-99) 138/75     Weight: 93.5 kg (206 lb 2.1 oz)  Body mass index is 27.2 kg/m².    Intake/Output Summary (Last 24 hours) at 10/19/2023 1438  Last data filed at 10/19/2023 0120  Gross per 24 hour   Intake 345.83 ml   Output 550 ml   Net -204.17 ml           Physical Exam  Constitutional:       General: He is not in acute  distress.     Appearance: He is not ill-appearing.   HENT:      Head: Normocephalic and atraumatic.      Right Ear: External ear normal.      Left Ear: External ear normal.      Nose: Nose normal. No congestion or rhinorrhea.      Mouth/Throat:      Mouth: Mucous membranes are dry.      Pharynx: Oropharynx is clear. No oropharyngeal exudate or posterior oropharyngeal erythema.   Eyes:      General: No scleral icterus.        Right eye: No discharge.         Left eye: No discharge.      Extraocular Movements: Extraocular movements intact.      Conjunctiva/sclera: Conjunctivae normal.   Cardiovascular:      Rate and Rhythm: Normal rate and regular rhythm.      Pulses: Normal pulses.      Heart sounds: Normal heart sounds. No murmur heard.     No gallop.   Pulmonary:      Effort: No respiratory distress.      Breath sounds: Normal breath sounds. No stridor. No wheezing or rales.   Chest:      Chest wall: No tenderness.   Abdominal:      General: Abdomen is flat. Bowel sounds are normal. There is no distension.      Palpations: Abdomen is soft. There is no mass.      Tenderness: There is no abdominal tenderness. There is no guarding or rebound.      Hernia: No hernia is present.   Musculoskeletal:         General: No swelling, tenderness or deformity. Normal range of motion.      Cervical back: Normal range of motion. No rigidity or tenderness.      Right lower leg: Edema present.      Left lower leg: Edema present.   Skin:     General: Skin is warm and dry.      Capillary Refill: Capillary refill takes less than 2 seconds.      Coloration: Skin is not jaundiced.      Findings: No bruising, erythema or rash.   Neurological:      General: No focal deficit present.      Mental Status: He is alert and oriented to person, place, and time.      Cranial Nerves: No cranial nerve deficit.      Sensory: No sensory deficit.      Motor: No weakness.   Psychiatric:         Mood and Affect: Mood normal.         Behavior: Behavior  normal.         Thought Content: Thought content normal.         Judgment: Judgment normal.             Significant Labs: All pertinent labs within the past 24 hours have been reviewed.    Significant Imaging: I have reviewed all pertinent imaging results/findings within the past 24 hours.

## 2023-10-19 NOTE — ASSESSMENT & PLAN NOTE
"Hgb 8.2 on admit, around baseline    Lab Results   Component Value Date    IRON 34 (L) 10/17/2023    TIBC 179 (L) 10/17/2023    FERRITIN 701 (H) 10/18/2023     Lab Results   Component Value Date    FOLATE 8.8 02/28/2023     Lab Results   Component Value Date    [id number] 278 06/20/2023     No results found for: "RETICCTPCT"    Likely secondary to CKD    Plan:   -   Lab Results   Component Value Date    HGB 8.1 (L) 10/19/2023     - Transfusion of 1u pRBCs on 10/16  "

## 2023-10-19 NOTE — PROGRESS NOTES
[facility] - Telemetry Stepdown  Transplant Nephrology  Progress Note    Patient Name: Ibrahima Phelps  MRN: [id number]  Admission Date: 10/13/2023  Hospital Length of Stay: 6 days  Attending Provider: Ricky Judge MD   Primary Care Physician: Anatoliy Colindres MD  Principal Problem:Unstable angina pectoris    Subjective:     Interval History: 67 yr old AAM with hx of kidney transplant in 4/12/2005 (bcr of 2.8-3.2), COPD, CAD s/p CABG in 2006 with active smoking, AFIB on amiodarone/Eliquis, HTN who is admitted for shortness of breath as well as dyspnea on exertion. Patient denies any fever but does admit to some congestion. Positive for cough but negative for nausea, vomiting, abdominal pain. Denies any diarrhea or swelling of his legs. Of note, patient admits to chest pain (present earlier this morning as well).    On arrival patient noted to be afebrile, mildly hypertensive with 100% on RA. Labs without any leukocytosis, creatinine of 4.1, BNP of 1168 with CXR showing RLL consolidation with CT chest findings compatible with pulmonary edema, severe coronary disease. Patient started on lasix IV with net negative 900 L.     Interval History:  Received 1 u of RBCs  Patient reports increase in urination  Cr stable from yesterday    Review of patient's allergies indicates:   Allergen Reactions    Ace inhibitors Swelling    Verapamil Other (See Comments)     Other reaction(s): Unknown    Povidone-iodine Itching     Current Facility-Administered Medications   Medication Frequency    0.9%  NaCl infusion (for blood administration) Q24H PRN    0.9%  NaCl infusion (for blood administration) Q24H PRN    acetaminophen tablet 650 mg Q6H PRN    amiodarone tablet 200 mg Daily    apixaban tablet 5 mg BID    atorvastatin tablet 40 mg Daily    calcium carbonate 200 mg calcium (500 mg) chewable tablet 1,000 mg Once    clopidogreL tablet 75 mg Daily    dextrose 10% bolus 125 mL 125 mL PRN    dextrose 10% bolus 250 mL 250 mL PRN    doxazosin  tablet 4 mg Q12H    ferrous sulfate tablet 1 each Daily    glucagon (human recombinant) injection 1 mg PRN    glucose chewable tablet 16 g PRN    glucose chewable tablet 24 g PRN    hydrALAZINE tablet 25 mg Q8H    ipratropium 42 mcg (0.06 %) nasal spray 2 spray QID PRN    isosorbide mononitrate 24 hr tablet 60 mg Daily    melatonin tablet 6 mg Nightly PRN    methocarbamoL tablet 500 mg QID PRN    metoprolol succinate (TOPROL-XL) 24 hr tablet 25 mg Daily    naloxone 0.4 mg/mL injection 0.02 mg PRN    nitroGLYCERIN SL tablet 0.4 mg Q5 Min PRN    ondansetron disintegrating tablet 8 mg Q8H PRN    pantoprazole EC tablet 40 mg Daily    predniSONE tablet 5 mg Daily    prochlorperazine injection Soln 5 mg Q6H PRN    sodium bicarbonate tablet 650 mg BID    sodium chloride 0.65 % nasal spray 1 spray Q4H    sodium chloride 0.9% flush 10 mL Q12H PRN    tacrolimus capsule 3 mg BID       Objective:     Vital Signs (Most Recent):  Temp: 97.6 °F (36.4 °C) (10/19/23 1112)  Pulse: (!) 54 (10/19/23 1510)  Resp: 18 (10/19/23 1112)  BP: 138/75 (10/19/23 1112)  SpO2: 99 % (10/19/23 1112) Vital Signs (24h Range):  Temp:  [97 °F (36.1 °C)-99.4 °F (37.4 °C)] 97.6 °F (36.4 °C)  Pulse:  [54-66] 54  Resp:  [18-21] 18  SpO2:  [98 %-99 %] 99 %  BP: (115-138)/(56-99) 138/75     Weight: 93.5 kg (206 lb 2.1 oz) (10/18/23 0400)  Body mass index is 27.2 kg/m².  Body surface area is 2.19 meters squared.    I/O last 3 completed shifts:  In: 345.8 [Blood:345.8]  Out: 950 [Urine:950]    Physical Exam  Vitals reviewed.   Constitutional:       General: He is not in acute distress.     Appearance: Normal appearance. He is not ill-appearing or toxic-appearing.   HENT:      Head: Normocephalic and atraumatic.      Right Ear: External ear normal.      Left Ear: External ear normal.      Nose: Nose normal.   Eyes:      General: No scleral icterus.     Conjunctiva/sclera: Conjunctivae normal.   Cardiovascular:      Rate and Rhythm: Normal rate and regular  rhythm.      Pulses: Normal pulses.      Heart sounds: Normal heart sounds. No murmur heard.     No friction rub.   Pulmonary:      Effort: Pulmonary effort is normal. No respiratory distress.      Breath sounds: Normal breath sounds. No wheezing or rhonchi.   Abdominal:      General: Bowel sounds are normal. There is no distension.      Palpations: Abdomen is soft.      Tenderness: There is no abdominal tenderness. There is no guarding.   Musculoskeletal:         General: No swelling or deformity. Normal range of motion.      Cervical back: Normal range of motion and neck supple. No tenderness.      Right lower leg: No edema.      Left lower leg: No edema.   Skin:     General: Skin is warm and dry.      Coloration: Skin is not jaundiced.      Findings: No erythema or rash.   Neurological:      General: No focal deficit present.      Mental Status: He is alert and oriented to person, place, and time.      Motor: No weakness.   Psychiatric:         Mood and Affect: Mood normal.         Behavior: Behavior normal.         Assessment/Plan:   67 yr old AAM with hx of kidney transplant in 2005 (bcrof 2.8-3.2), COPD, CAD s/p CABG in 2006 with active smoking, AFIB on amiodarone/Eliquis, HTN who is admitted for shortness of breath as well as dyspnea on exertion, Palliative care consulted yesterday for goals of care discussion, he states he has had enough dialysis and does not want to pursue dialysis now, but may consider it in the future after we discussed the risks of contrast by cardiology    1) Community acquired pneumonia  Treated by primary team, reviewed medication interreactions    2) KATINA  on CKD G4A2 of kidney transplant  Most likely multifactorial, both cardiorenal, natural progression of CKD, and anemia that is symptomatic, recommend transfusion today for Hgb closer to 8.0  - baseline creatinine 2.8 to 3.2  - Cr today:  Lab Results   Component Value Date    CREATININE 5.6 (H) 10/19/2023   - need accurate I/O so we  can make thoughtful assessments and make safe clinical plans, thanks    3) Immunosuppression  Tacrolimus Lvl (ng/mL)   Date Value   10/19/2023 4.4 (L)   - Stopped taking Cellcept roughly a year ago per Dr. Diaz/Dr. Webb  - Tacrolimus goal between 4 and 6, continue 3 mg twice daily  - monitor for drug toxicity and interactions with other therapies daily    4) Hypertension  Not at goal of near 120/70 while in the hospital  - had CP that appears to be Angina, recommend cardiology manage for medical optimization, not sure he is a candidate for angiography  - recommend stopping Nifedipine and Hydralazine if possible   - does not appear clinically hypervolemic, will assess daily for diuretic need    5) Acid Base and Electrolytes  - NAGMA, mild   - Continue sodium bicarbonate 650 mg twice daily    6) Anemia of CKD  - Iron studies, Fe 34, % sat 19, Transferrin 121, TIBC 179, Ferritin 701, recommend IV iron 1 gram divide doses QOD, oral iron may not be sufficient with CKD4/5,     - Transfused 1 UPRBC 10/16/23, 1u 10/18  - S/p 4.7k units Epo on 1018  - Transfuse Hgb less than 7.0, consider higher threshold of 8.0 in the setting of CAD  - Would recommend Epo 10K units weekly    7) CKD-MBD  Hyperphosphatemia, recommend dietary management at this time, consider phosphate binder if greater than 5.5  - last iPTH 9/23 was 32.9  - Vitamin D 1,25 10, D ,25 Hydroxy level 72 on 1/23    We will continue to follow. Please call if you have any questions.     Jose Manuel Gray MD  PGY-5  Nephrology

## 2023-10-19 NOTE — ASSESSMENT & PLAN NOTE
Kidney transplant in 1996 and 2003  Stopped taking Cellcept roughly a year ago per Dr. Diaz/Dr. Webb  Cr elevated to 4.1 on admission, around patients baseline     - Tacro level low; Tacrolimus goal between 4 and 6, continue 3 mg twice itzel  - Continue prednisone  - KTM following, awaiting recs as Cr steadily rising  - monitor for drug toxicity and interactions with other therapies daily

## 2023-10-19 NOTE — ASSESSMENT & PLAN NOTE
Having chest pain during admission, notable episode on 10/16; troponin obtained: elevated from days prior, will trend. EKG significant for ST changes. Cardiology following    - ACS protocol initiated.   - Heparin gtt for 48 hours, with plans to resume Eliquis after once off the heparin gtt  - If patient is complaining of chest pain, inform cardiology  - ASA and Plavix loading dose initiated, followed by 81/75mg maintenance therapy; d/c ASA on 10/18  - Start metoprolol 25 mg  - Continue atorvastatin 10 mg

## 2023-10-19 NOTE — PROGRESS NOTES
"[facility] - Telemetry [facility] Medicine  Progress Note    Patient Name: Ibrahima Phelps  MRN: [id number]  Patient Class: IP- Inpatient   Admission Date: 10/13/2023  Length of Stay: 6 days  Attending Physician: Ricyk Judge MD  Primary Care Provider: Anatoliy Colindres MD        Subjective:     Principal Problem:Unstable angina pectoris        HPI:  66 yo M active smoker w/ COPD, CAD s/p CABG x1 in 2006, paroxysmal Afib s/p cardioversion 3/2022 (on Eliquis, Metoprolol, and Amiodarone), HFpEF (EF 65%; not currently taking diuretics), hiatal hernia with reflux, HTN on Hydralazine, CKD4 s/p kidney transplants in 1992 and 2005 on Tacrolimus and Prednisone, presented to the ED with his wife complaining about worsening shortness of breath/DAUGHERTY; infrequent, nonproductive cough; and severe, sharp L posterior heel pain that started a few days ago. Per the patient, he woke up last night "gasping for air," short of breath, and with substernal chest pain radiating to his back. The chest pain resolved with Omeprazole; denies chest pain at this time. The shortness of breath and infrequent, nonproductive cough typically occur at rest and are worse with walking. The severe, sharp L posterior heel pain is worse with movement and walking, and has not responded to leg elevation. Of note, he has a history of tenosynovitis. He noted significant, watery diarrhea associated with stomach pain all day yesterday that resolved with Peptobismol and water; denies diarrhea this morning. Denies fevers, chills, nausea, vomiting, constipation, dysuria, hematuria, palpitations, headache, or vision changes.     His presenting vital signs were a temperature of 98.3, pulse rate of 57-84 bpm, resp rate of 20-28, blood pressure ranging from 156-169/69-74, MAP of 104-107, and SpO2 % on RA. Troponin (0.081), Magnesium (nml), CBC (Hgb 8.2, platelets 127), CMP (CO2 19, BUN 33, Cr 4.1), and BNP (1,168) were ordered. CXR showed RLL consolidation. XR L " ankle showed calcaneal enthesopathy. CTAP showed trace bilateral pleural effusions, bibasilar dependent pulmonary opacities, new prominence/thickening of the L iliac fossa transplant kidney's ureter. Started on CTX, AZT, and Lasix      Overview/Hospital Course:  Patient admitted for further management. Patient continued having chest pain, cardiology consulted. Patient found to have elevated PA pressures concerning for pHTN which was likely contributing to shortness of breath. KTM consulted and recommended stopping lasix. Pt continuing to have chest pain in hospital, Cardiology following, initiated ACS protocol with plans to do a PET stress to assess for Ischemia, though patient not presently a candidate for LHC given CKD5. Pt not amenable to dialysis. Pt transfused 1 unit of pRBCs during admission for Hgb <7. Palliative consulted for GOC      Interval History: No new episodes of chest pain. Pt off heparin drip and transitioned back to eliquis. Making more urine output today. Awaiting input from KTM on whether or not to diurese as Cr is still elevated though with a minimal delta.Patient otherwise doing well and appears in good spirits this morning.  Patient doing better this morning.    Review of Systems   Constitutional:  Negative for chills, fatigue and fever.   HENT:  Negative for ear pain and sinus pain.    Eyes:  Negative for photophobia, pain and visual disturbance.   Respiratory:  Negative for chest tightness, shortness of breath and wheezing.    Cardiovascular:  Positive for chest pain. Negative for leg swelling.   Gastrointestinal:  Negative for abdominal pain, blood in stool, constipation, diarrhea, nausea and vomiting.   Endocrine: Negative for polydipsia and polyuria.   Genitourinary:  Negative for difficulty urinating, dysuria, flank pain, frequency, penile pain and testicular pain.   Musculoskeletal:  Negative for back pain, neck pain and neck stiffness.   Skin:  Negative for color change and rash.    Neurological:  Negative for dizziness, syncope, weakness, numbness and headaches.   Psychiatric/Behavioral:  Negative for confusion and sleep disturbance.      Objective:     Vital Signs (Most Recent):  Temp: 97.6 °F (36.4 °C) (10/19/23 1112)  Pulse: (!) 58 (10/19/23 1112)  Resp: 18 (10/19/23 1112)  BP: 138/75 (10/19/23 1112)  SpO2: 99 % (10/19/23 1112) Vital Signs (24h Range):  Temp:  [97 °F (36.1 °C)-99.4 °F (37.4 °C)] 97.6 °F (36.4 °C)  Pulse:  [58-66] 58  Resp:  [18-21] 18  SpO2:  [98 %-99 %] 99 %  BP: (105-138)/(50-99) 138/75     Weight: 93.5 kg (206 lb 2.1 oz)  Body mass index is 27.2 kg/m².    Intake/Output Summary (Last 24 hours) at 10/19/2023 1438  Last data filed at 10/19/2023 0120  Gross per 24 hour   Intake 345.83 ml   Output 550 ml   Net -204.17 ml           Physical Exam  Constitutional:       General: He is not in acute distress.     Appearance: He is not ill-appearing.   HENT:      Head: Normocephalic and atraumatic.      Right Ear: External ear normal.      Left Ear: External ear normal.      Nose: Nose normal. No congestion or rhinorrhea.      Mouth/Throat:      Mouth: Mucous membranes are dry.      Pharynx: Oropharynx is clear. No oropharyngeal exudate or posterior oropharyngeal erythema.   Eyes:      General: No scleral icterus.        Right eye: No discharge.         Left eye: No discharge.      Extraocular Movements: Extraocular movements intact.      Conjunctiva/sclera: Conjunctivae normal.   Cardiovascular:      Rate and Rhythm: Normal rate and regular rhythm.      Pulses: Normal pulses.      Heart sounds: Normal heart sounds. No murmur heard.     No gallop.   Pulmonary:      Effort: No respiratory distress.      Breath sounds: Normal breath sounds. No stridor. No wheezing or rales.   Chest:      Chest wall: No tenderness.   Abdominal:      General: Abdomen is flat. Bowel sounds are normal. There is no distension.      Palpations: Abdomen is soft. There is no mass.      Tenderness: There is  no abdominal tenderness. There is no guarding or rebound.      Hernia: No hernia is present.   Musculoskeletal:         General: No swelling, tenderness or deformity. Normal range of motion.      Cervical back: Normal range of motion. No rigidity or tenderness.      Right lower leg: Edema present.      Left lower leg: Edema present.   Skin:     General: Skin is warm and dry.      Capillary Refill: Capillary refill takes less than 2 seconds.      Coloration: Skin is not jaundiced.      Findings: No bruising, erythema or rash.   Neurological:      General: No focal deficit present.      Mental Status: He is alert and oriented to person, place, and time.      Cranial Nerves: No cranial nerve deficit.      Sensory: No sensory deficit.      Motor: No weakness.   Psychiatric:         Mood and Affect: Mood normal.         Behavior: Behavior normal.         Thought Content: Thought content normal.         Judgment: Judgment normal.             Significant Labs: All pertinent labs within the past 24 hours have been reviewed.    Significant Imaging: I have reviewed all pertinent imaging results/findings within the past 24 hours.      Assessment/Plan:      * Unstable angina pectoris  Having chest pain during admission, notable episode on 10/16; troponin obtained: elevated from days prior, will trend. EKG significant for ST changes. Cardiology following    - ACS protocol initiated.   - Heparin gtt for 48 hours, with plans to resume Eliquis after once off the heparin gtt  - If patient is complaining of chest pain, inform cardiology  - ASA and Plavix loading dose initiated, followed by 81/75mg maintenance therapy; d/c ASA on 10/18  - Start metoprolol 25 mg  - Continue atorvastatin 10 mg      Consolidation lung  Patchy consolidation observed on CXR in ED. CT was largely unremarkable. Patient has worsening renal function.     - d/c antibiotics      CKD (chronic kidney disease), stage IV  Cr. Today was 5.5, baseline around 3.7-4.  "Managed by nephrology outpatient    - KTM consulted, appreciate recommendations  - avoid nephrotoxic drugs and renally dose medications  - strict I/Os      Anemia in stage 4 chronic kidney disease  Hgb 8.2 on admit, around baseline    Lab Results   Component Value Date    IRON 34 (L) 10/17/2023    TIBC 179 (L) 10/17/2023    FERRITIN 701 (H) 10/18/2023     Lab Results   Component Value Date    FOLATE 8.8 02/28/2023     Lab Results   Component Value Date    CTUGBWUY19 278 06/20/2023     No results found for: "RETICCTPCT"    Likely secondary to CKD    Plan:   -   Lab Results   Component Value Date    HGB 8.1 (L) 10/19/2023     - Transfusion of 1u pRBCs on 10/16    Acute on chronic heart failure with preserved ejection fraction  Patient admitted with shortness of breath, edema, PND, orthopnea, consistent with acute on chronic diastolic CHF exacerbation.  Last TTE in February of 2023; EF 65% with Grade II Diastolic Dysfunction    BNP: BNP  Recent Labs   Lab 10/13/23  0836   BNP 1,168*     CXR: No significant pleural fluid, evidence of patchy consolidation    Troponin:   Recent Labs   Lab 10/16/23  1112   TROPONINI 0.084*       Home Meds:  Prescribed Lasix 40mg though not taking, states he took 20mg every other day over past week 2/2 swelling    Plan:  - hold lasix per KTM (in setting of ATN)  - Transition BilDil to Imdur  - Metoprolol 25, Nifedipine 60, and Hydral 50 TID  - Strict ins and outs           Immunodeficiency due to treatment with immunosuppressive medication  See H/o Kidney transplant    Palliative care encounter  Palliative consulted on 10/16, see note  - not amenable to discussing code status on initial encounter      Chronic obstructive pulmonary disease  Pt complaining of cough and shortness of breath, duration 3 days  Saturating appropriately on room air, no wheezing or abnormal breath sounds on auscultation     - holding home duonebs      Paroxysmal atrial fibrillation  Patient with Long standing " persistent (>12 months) atrial fibrillation which is controlled currently with Beta Blocker and Amiodarone. Patient is currently in sinus rhythm.[id number] Score: 2. . Anticoagulation indicated. Anticoagulation done with apixaban.  S/p cardioversion in March of 2022    - Continue amio, metoprolol, and apixaban  - monitor on telemetry    H/O kidney transplant  Kidney transplant in 1996 and 2003  Stopped taking Cellcept roughly a year ago per Dr. Diaz/Dr. Webb  Cr elevated to 4.1 on admission, around patients baseline     - Tacro level low; Tacrolimus goal between 4 and 6, continue 3 mg twice itzel  - Continue prednisone  - KTM following, awaiting recs as Cr steadily rising  - monitor for drug toxicity and interactions with other therapies daily    Long term (current) use of anticoagulants [V58.61]  Hold apixaban with plans to start heparin infusion  Heparin infusion stopped on 10/18, resumed apixaban    Mixed hyperlipidemia  Chronic and stable    - continue statin      CAD (coronary artery disease)  CABG in 2005    - Discontinue ASA and continue Statin  - Slight troponin elevation on admission, will trend      VTE Risk Mitigation (From admission, onward)         Ordered     apixaban tablet 5 mg  2 times daily         10/18/23 1623                Discharge Planning   RAMU: 10/21/2023     Code Status: Full Code   Is the patient medically ready for discharge?:     Reason for patient still in hospital (select all that apply): Patient trending condition  Discharge Plan A: Home with family              Evan Porras MD  Department of Hospital Medicine   [facility] - Telemetry Stepdown

## 2023-10-19 NOTE — PLAN OF CARE
Problem: Adult Inpatient Plan of Care  Goal: Plan of Care Review  Outcome: Ongoing, Progressing  Goal: Patient-Specific Goal (Individualized)  Outcome: Ongoing, Progressing  Goal: Absence of Hospital-Acquired Illness or Injury  Outcome: Ongoing, Progressing  Goal: Optimal Comfort and Wellbeing  Outcome: Ongoing, Progressing  Goal: Readiness for Transition of Care  Outcome: Ongoing, Progressing     Problem: Fluid and Electrolyte Imbalance (Acute Kidney Injury/Impairment)  Goal: Fluid and Electrolyte Balance  Outcome: Ongoing, Progressing     Problem: Oral Intake Inadequate (Acute Kidney Injury/Impairment)  Goal: Optimal Nutrition Intake  Outcome: Ongoing, Progressing     Problem: Renal Function Impairment (Acute Kidney Injury/Impairment)  Goal: Effective Renal Function  Outcome: Ongoing, Progressing     Problem: Pain Acute  Goal: Acceptable Pain Control and Functional Ability  Outcome: Ongoing, Progressing     Problem: Coping Ineffective  Goal: Effective Coping  Outcome: Ongoing, Progressing     Problem: Fall Injury Risk  Goal: Absence of Fall and Fall-Related Injury  Outcome: Ongoing, Progressing   Patient AAOx4. No apparent signs of distress noted at this time. Patient is ambulating independently. Denies pain. Safety checks complete. POC reviewed with patient and understanding verbalized. Call light and personal belongings within reach. Wife at bedside. Care is ongoing.

## 2023-10-19 NOTE — PLAN OF CARE
Pt is AAOx4, independent w/ wife at bedside. Pt denies any chest pain however endorses pain in L heel that is relieved by PRN robaxin. Denies any SOB at this time, SR on tele no ectopy, and is on RA maintaining sats. Pt s/p 1 unit PRBC this shift for H/H of 7.4/23.3 repeat H/H recovered to 8.1/26.2. Heparin gtt stopped 10/18 and Eliquis restarted bleeding precautions maintained. Cr 5.6 this morning pt endorses intermittent voiding difficulties and low UOP during the day however voided adequately for this shift and able to provide Urine sample-results pending      Problem: Adult Inpatient Plan of Care  Goal: Plan of Care Review  Outcome: Ongoing, Progressing  Goal: Patient-Specific Goal (Individualized)  Outcome: Ongoing, Progressing  Goal: Absence of Hospital-Acquired Illness or Injury  Outcome: Ongoing, Progressing  Goal: Optimal Comfort and Wellbeing  Outcome: Ongoing, Progressing  Goal: Readiness for Transition of Care  Outcome: Ongoing, Progressing     Problem: Fluid and Electrolyte Imbalance (Acute Kidney Injury/Impairment)  Goal: Fluid and Electrolyte Balance  Outcome: Ongoing, Progressing     Problem: Oral Intake Inadequate (Acute Kidney Injury/Impairment)  Goal: Optimal Nutrition Intake  Outcome: Ongoing, Progressing     Problem: Renal Function Impairment (Acute Kidney Injury/Impairment)  Goal: Effective Renal Function  Outcome: Ongoing, Progressing     Problem: Pain Acute  Goal: Acceptable Pain Control and Functional Ability  Outcome: Ongoing, Progressing     Problem: Coping Ineffective  Goal: Effective Coping  Outcome: Ongoing, Progressing     Problem: Fall Injury Risk  Goal: Absence of Fall and Fall-Related Injury  Outcome: Ongoing, Progressing

## 2023-10-19 NOTE — ASSESSMENT & PLAN NOTE
Patient with Long standing persistent (>12 months) atrial fibrillation which is controlled currently with Beta Blocker and Amiodarone. Patient is currently in sinus rhythm.[id number] Score: 2. . Anticoagulation indicated. Anticoagulation done with apixaban.  S/p cardioversion in March of 2022    - Continue amio, metoprolol, and apixaban  - monitor on telemetry

## 2023-10-20 ENCOUNTER — PATIENT MESSAGE (OUTPATIENT)
Dept: NEPHROLOGY | Facility: CLINIC | Age: 68
End: 2023-10-20
Payer: MEDICARE

## 2023-10-20 VITALS
DIASTOLIC BLOOD PRESSURE: 58 MMHG | RESPIRATION RATE: 18 BRPM | TEMPERATURE: 98 F | OXYGEN SATURATION: 93 % | SYSTOLIC BLOOD PRESSURE: 129 MMHG | HEART RATE: 54 BPM | WEIGHT: 205.94 LBS | HEIGHT: 73 IN | BODY MASS INDEX: 27.29 KG/M2

## 2023-10-20 DIAGNOSIS — N18.4 CKD (CHRONIC KIDNEY DISEASE), STAGE IV: Primary | ICD-10-CM

## 2023-10-20 DIAGNOSIS — N18.5 CKD (CHRONIC KIDNEY DISEASE), SYMPTOM MANAGEMENT ONLY, STAGE 5: ICD-10-CM

## 2023-10-20 LAB
ALBUMIN SERPL BCP-MCNC: 2.5 G/DL (ref 3.5–5.2)
ALP SERPL-CCNC: 59 U/L (ref 55–135)
ALT SERPL W/O P-5'-P-CCNC: 12 U/L (ref 10–44)
ANION GAP SERPL CALC-SCNC: 11 MMOL/L (ref 8–16)
AST SERPL-CCNC: 15 U/L (ref 10–40)
BASOPHILS # BLD AUTO: 0.02 K/UL (ref 0–0.2)
BASOPHILS NFR BLD: 0.3 % (ref 0–1.9)
BILIRUB SERPL-MCNC: 0.4 MG/DL (ref 0.1–1)
BUN SERPL-MCNC: 49 MG/DL (ref 8–23)
CA-I BLDV-SCNC: 0.89 MMOL/L (ref 1.06–1.42)
CALCIUM SERPL-MCNC: 6.9 MG/DL (ref 8.7–10.5)
CHLORIDE SERPL-SCNC: 105 MMOL/L (ref 95–110)
CO2 SERPL-SCNC: 21 MMOL/L (ref 23–29)
CREAT SERPL-MCNC: 5.4 MG/DL (ref 0.5–1.4)
DIFFERENTIAL METHOD: ABNORMAL
EOSINOPHIL # BLD AUTO: 0.4 K/UL (ref 0–0.5)
EOSINOPHIL NFR BLD: 4.6 % (ref 0–8)
ERYTHROCYTE [DISTWIDTH] IN BLOOD BY AUTOMATED COUNT: 19.8 % (ref 11.5–14.5)
EST. GFR  (NO RACE VARIABLE): 10.8 ML/MIN/1.73 M^2
GLUCOSE SERPL-MCNC: 77 MG/DL (ref 70–110)
HCT VFR BLD AUTO: 25.7 % (ref 40–54)
HGB BLD-MCNC: 8.2 G/DL (ref 14–18)
IMM GRANULOCYTES # BLD AUTO: 0.04 K/UL (ref 0–0.04)
IMM GRANULOCYTES NFR BLD AUTO: 0.5 % (ref 0–0.5)
LYMPHOCYTES # BLD AUTO: 1.5 K/UL (ref 1–4.8)
LYMPHOCYTES NFR BLD: 18.9 % (ref 18–48)
MAGNESIUM SERPL-MCNC: 3.1 MG/DL (ref 1.6–2.6)
MCH RBC QN AUTO: 24.8 PG (ref 27–31)
MCHC RBC AUTO-ENTMCNC: 31.9 G/DL (ref 32–36)
MCV RBC AUTO: 78 FL (ref 82–98)
MONOCYTES # BLD AUTO: 0.8 K/UL (ref 0.3–1)
MONOCYTES NFR BLD: 9.7 % (ref 4–15)
NEUTROPHILS # BLD AUTO: 5.2 K/UL (ref 1.8–7.7)
NEUTROPHILS NFR BLD: 66 % (ref 38–73)
NRBC BLD-RTO: 0 /100 WBC
PHOSPHATE SERPL-MCNC: 4.8 MG/DL (ref 2.7–4.5)
PLATELET # BLD AUTO: 147 K/UL (ref 150–450)
PMV BLD AUTO: ABNORMAL FL (ref 9.2–12.9)
POTASSIUM SERPL-SCNC: 3.9 MMOL/L (ref 3.5–5.1)
PROT SERPL-MCNC: 5.8 G/DL (ref 6–8.4)
RBC # BLD AUTO: 3.31 M/UL (ref 4.6–6.2)
SODIUM SERPL-SCNC: 137 MMOL/L (ref 136–145)
TACROLIMUS BLD-MCNC: 2.2 NG/ML (ref 5–15)
WBC # BLD AUTO: 7.84 K/UL (ref 3.9–12.7)

## 2023-10-20 PROCEDURE — 1111F PR DISCHARGE MEDS RECONCILED W/ CURRENT OUTPATIENT MED LIST: ICD-10-PCS | Mod: CPTII,GC,, | Performed by: STUDENT IN AN ORGANIZED HEALTH CARE EDUCATION/TRAINING PROGRAM

## 2023-10-20 PROCEDURE — 1111F DSCHRG MED/CURRENT MED MERGE: CPT | Mod: CPTII,GC,, | Performed by: STUDENT IN AN ORGANIZED HEALTH CARE EDUCATION/TRAINING PROGRAM

## 2023-10-20 PROCEDURE — 63600175 PHARM REV CODE 636 W HCPCS: Performed by: INTERNAL MEDICINE

## 2023-10-20 PROCEDURE — 25000003 PHARM REV CODE 250

## 2023-10-20 PROCEDURE — 99239 HOSP IP/OBS DSCHRG MGMT >30: CPT | Mod: GC,,, | Performed by: STUDENT IN AN ORGANIZED HEALTH CARE EDUCATION/TRAINING PROGRAM

## 2023-10-20 PROCEDURE — 99233 SBSQ HOSP IP/OBS HIGH 50: CPT | Mod: ,,, | Performed by: INTERNAL MEDICINE

## 2023-10-20 PROCEDURE — 99239 PR HOSPITAL DISCHARGE DAY,>30 MIN: ICD-10-PCS | Mod: GC,,, | Performed by: STUDENT IN AN ORGANIZED HEALTH CARE EDUCATION/TRAINING PROGRAM

## 2023-10-20 PROCEDURE — 80053 COMPREHEN METABOLIC PANEL: CPT

## 2023-10-20 PROCEDURE — 25000003 PHARM REV CODE 250: Performed by: INTERNAL MEDICINE

## 2023-10-20 PROCEDURE — 85025 COMPLETE CBC W/AUTO DIFF WBC: CPT

## 2023-10-20 PROCEDURE — 99233 PR SUBSEQUENT HOSPITAL CARE,LEVL III: ICD-10-PCS | Mod: ,,, | Performed by: INTERNAL MEDICINE

## 2023-10-20 PROCEDURE — 83735 ASSAY OF MAGNESIUM: CPT

## 2023-10-20 PROCEDURE — 84100 ASSAY OF PHOSPHORUS: CPT

## 2023-10-20 PROCEDURE — 36415 COLL VENOUS BLD VENIPUNCTURE: CPT | Performed by: INTERNAL MEDICINE

## 2023-10-20 PROCEDURE — 80197 ASSAY OF TACROLIMUS: CPT | Performed by: INTERNAL MEDICINE

## 2023-10-20 PROCEDURE — 25000003 PHARM REV CODE 250: Performed by: STUDENT IN AN ORGANIZED HEALTH CARE EDUCATION/TRAINING PROGRAM

## 2023-10-20 PROCEDURE — 63600175 PHARM REV CODE 636 W HCPCS

## 2023-10-20 PROCEDURE — 82330 ASSAY OF CALCIUM: CPT | Performed by: INTERNAL MEDICINE

## 2023-10-20 RX ORDER — OMEPRAZOLE 20 MG/1
20 CAPSULE, DELAYED RELEASE ORAL DAILY PRN
Start: 2023-10-20 | End: 2024-10-19

## 2023-10-20 RX ORDER — GABAPENTIN 100 MG/1
100 CAPSULE ORAL
Start: 2023-10-20 | End: 2024-10-19

## 2023-10-20 RX ORDER — CALCIUM CARBONATE 200(500)MG
1000 TABLET,CHEWABLE ORAL ONCE
Status: COMPLETED | OUTPATIENT
Start: 2023-10-20 | End: 2023-10-20

## 2023-10-20 RX ORDER — CALCITRIOL 0.25 UG/1
0.5 CAPSULE ORAL DAILY
Status: DISCONTINUED | OUTPATIENT
Start: 2023-10-20 | End: 2023-10-20 | Stop reason: HOSPADM

## 2023-10-20 RX ORDER — TACROLIMUS 1 MG/1
3 CAPSULE ORAL EVERY 12 HOURS
Start: 2023-10-20 | End: 2023-10-31 | Stop reason: SDUPTHER

## 2023-10-20 RX ORDER — FERROUS SULFATE 325(65) MG
325 TABLET, DELAYED RELEASE (ENTERIC COATED) ORAL DAILY
Qty: 30 TABLET | Refills: 0 | Status: SHIPPED | OUTPATIENT
Start: 2023-10-20 | End: 2023-11-19

## 2023-10-20 RX ORDER — CALCIUM CARBONATE 200(500)MG
500 TABLET,CHEWABLE ORAL ONCE
Status: COMPLETED | OUTPATIENT
Start: 2023-10-20 | End: 2023-10-20

## 2023-10-20 RX ORDER — FLUTICASONE PROPIONATE 50 MCG
1 SPRAY, SUSPENSION (ML) NASAL DAILY PRN
Start: 2023-10-20

## 2023-10-20 RX ORDER — CLOPIDOGREL BISULFATE 75 MG/1
75 TABLET ORAL DAILY
Qty: 30 TABLET | Refills: 11 | Status: SHIPPED | OUTPATIENT
Start: 2023-10-21 | End: 2024-10-20

## 2023-10-20 RX ORDER — NIFEDIPINE 30 MG/1
60 TABLET, EXTENDED RELEASE ORAL 2 TIMES DAILY
Status: DISCONTINUED | OUTPATIENT
Start: 2023-10-20 | End: 2023-10-20 | Stop reason: HOSPADM

## 2023-10-20 RX ORDER — NITROGLYCERIN 0.4 MG/1
0.4 TABLET SUBLINGUAL EVERY 5 MIN PRN
Qty: 25 TABLET | Refills: 0 | Status: SHIPPED | OUTPATIENT
Start: 2023-10-20 | End: 2024-10-19

## 2023-10-20 RX ORDER — ISOSORBIDE MONONITRATE 30 MG/1
60 TABLET, EXTENDED RELEASE ORAL DAILY
Qty: 60 TABLET | Refills: 11 | Status: ON HOLD | OUTPATIENT
Start: 2023-10-21 | End: 2023-11-26 | Stop reason: SDUPTHER

## 2023-10-20 RX ORDER — ATORVASTATIN CALCIUM 40 MG/1
40 TABLET, FILM COATED ORAL DAILY
Qty: 90 TABLET | Refills: 3 | Status: ON HOLD | OUTPATIENT
Start: 2023-10-21 | End: 2023-11-26 | Stop reason: SDUPTHER

## 2023-10-20 RX ADMIN — CALCITRIOL CAPSULES 0.25 MCG 0.5 MCG: 0.25 CAPSULE ORAL at 09:10

## 2023-10-20 RX ADMIN — NIFEDIPINE 60 MG: 30 TABLET, FILM COATED, EXTENDED RELEASE ORAL at 11:10

## 2023-10-20 RX ADMIN — AMIODARONE HYDROCHLORIDE 200 MG: 200 TABLET ORAL at 09:10

## 2023-10-20 RX ADMIN — NASAL 1 SPRAY: 6.5 SPRAY NASAL at 02:10

## 2023-10-20 RX ADMIN — METOPROLOL SUCCINATE 25 MG: 25 TABLET, EXTENDED RELEASE ORAL at 09:10

## 2023-10-20 RX ADMIN — NASAL 1 SPRAY: 6.5 SPRAY NASAL at 09:10

## 2023-10-20 RX ADMIN — PANTOPRAZOLE SODIUM 40 MG: 40 TABLET, DELAYED RELEASE ORAL at 09:10

## 2023-10-20 RX ADMIN — ISOSORBIDE MONONITRATE 60 MG: 60 TABLET, EXTENDED RELEASE ORAL at 09:10

## 2023-10-20 RX ADMIN — SODIUM BICARBONATE 650 MG TABLET 650 MG: at 09:10

## 2023-10-20 RX ADMIN — CALCIUM CARBONATE (ANTACID) CHEW TAB 500 MG 500 MG: 500 CHEW TAB at 12:10

## 2023-10-20 RX ADMIN — NASAL 1 SPRAY: 6.5 SPRAY NASAL at 06:10

## 2023-10-20 RX ADMIN — TACROLIMUS 3 MG: 1 CAPSULE ORAL at 09:10

## 2023-10-20 RX ADMIN — ATORVASTATIN CALCIUM 40 MG: 40 TABLET, FILM COATED ORAL at 09:10

## 2023-10-20 RX ADMIN — PREDNISONE 5 MG: 5 TABLET ORAL at 09:10

## 2023-10-20 RX ADMIN — CLOPIDOGREL BISULFATE 75 MG: 75 TABLET ORAL at 09:10

## 2023-10-20 RX ADMIN — APIXABAN 5 MG: 5 TABLET, FILM COATED ORAL at 09:10

## 2023-10-20 RX ADMIN — ACETAMINOPHEN 650 MG: 325 TABLET ORAL at 01:10

## 2023-10-20 RX ADMIN — FERROUS SULFATE TAB EC 325 MG (65 MG FE EQUIVALENT) 1 EACH: 325 (65 FE) TABLET DELAYED RESPONSE at 09:10

## 2023-10-20 RX ADMIN — DOXAZOSIN 4 MG: 2 TABLET ORAL at 09:10

## 2023-10-20 RX ADMIN — CALCIUM CARBONATE (ANTACID) CHEW TAB 500 MG 1000 MG: 500 CHEW TAB at 09:10

## 2023-10-20 RX ADMIN — HYDRALAZINE HYDROCHLORIDE 25 MG: 25 TABLET, FILM COATED ORAL at 06:10

## 2023-10-20 NOTE — TELEPHONE ENCOUNTER
Wife called to schedule a hospital follow up per Dr. Webb in Transplant. Dr. Latif, please advise when we can work him in. Our first opening is January.

## 2023-10-20 NOTE — PROGRESS NOTES
[facility] - Telemetry Stepdown  Transplant Nephrology  Progress Note    Patient Name: Ibrahima Phelps  MRN: [id number]  Admission Date: 10/13/2023  Hospital Length of Stay: 7 days  Attending Provider: No att. providers found   Primary Care Physician: Anatoliy Colindres MD  Principal Problem:Unstable angina pectoris    Subjective:     Interval History: 67 yr old AAM with hx of kidney transplant in 4/12/2005 (bcr of 2.8-3.2), COPD, CAD s/p CABG in 2006 with active smoking, AFIB on amiodarone/Eliquis, HTN who is admitted for shortness of breath as well as dyspnea on exertion. Patient denies any fever but does admit to some congestion. Positive for cough but negative for nausea, vomiting, abdominal pain. Denies any diarrhea or swelling of his legs. Of note, patient admits to chest pain (present earlier this morning as well).    On arrival patient noted to be afebrile, mildly hypertensive with 100% on RA. Labs without any leukocytosis, creatinine of 4.1, BNP of 1168 with CXR showing RLL consolidation with CT chest findings compatible with pulmonary edema, severe coronary disease. Patient started on lasix IV with net negative 900 L.     Interval History:  Cr remains stable/slightly improved  UOP not recorded yesterday  Patient again expressed desire to go home  Tacrolimus level low this morning, missed PM dose yesterday  Ca level low, getting Ca supplement    Review of patient's allergies indicates:   Allergen Reactions    Ace inhibitors Swelling    Verapamil Other (See Comments)     Other reaction(s): Unknown    Povidone-iodine Itching     Current Facility-Administered Medications   Medication Frequency    0.9%  NaCl infusion (for blood administration) Q24H PRN    0.9%  NaCl infusion (for blood administration) Q24H PRN    acetaminophen tablet 650 mg Q6H PRN    amiodarone tablet 200 mg Daily    apixaban tablet 5 mg BID    atorvastatin tablet 40 mg Daily    calcitRIOL capsule 0.5 mcg Daily    clopidogreL tablet 75 mg Daily     dextrose 10% bolus 125 mL 125 mL PRN    dextrose 10% bolus 250 mL 250 mL PRN    doxazosin tablet 4 mg Q12H    ferrous sulfate tablet 1 each Daily    glucagon (human recombinant) injection 1 mg PRN    glucose chewable tablet 16 g PRN    glucose chewable tablet 24 g PRN    hydrALAZINE tablet 25 mg Q8H    ipratropium 42 mcg (0.06 %) nasal spray 2 spray QID PRN    isosorbide mononitrate 24 hr tablet 60 mg Daily    melatonin tablet 6 mg Nightly PRN    methocarbamoL tablet 500 mg QID PRN    metoprolol succinate (TOPROL-XL) 24 hr tablet 25 mg Daily    naloxone 0.4 mg/mL injection 0.02 mg PRN    NIFEdipine 24 hr tablet 60 mg BID    nitroGLYCERIN SL tablet 0.4 mg Q5 Min PRN    ondansetron disintegrating tablet 8 mg Q8H PRN    pantoprazole EC tablet 40 mg Daily    predniSONE tablet 5 mg Daily    prochlorperazine injection Soln 5 mg Q6H PRN    sodium bicarbonate tablet 650 mg BID    sodium chloride 0.65 % nasal spray 1 spray Q4H    sodium chloride 0.9% flush 10 mL Q12H PRN    tacrolimus capsule 3 mg BID     Current Outpatient Medications   Medication    acetaminophen (TYLENOL) 500 MG tablet    albuterol (PROVENTIL) 2.5 mg /3 mL (0.083 %) nebulizer solution    albuterol (VENTOLIN HFA) 90 mcg/actuation inhaler    amiodarone (PACERONE) 200 MG Tab    apixaban (ELIQUIS) 5 mg Tab    b complex vitamins (B COMPLEX-VITAMIN B12) tablet    calcium carbonate (CALCIUM 600 ORAL)    doxazosin (CARDURA) 4 MG tablet    famotidine (PEPCID) 20 MG tablet    fexofenadine HCl (ALLEGRA ORAL)    furosemide (LASIX) 40 MG tablet    hydrALAZINE (APRESOLINE) 50 MG tablet    metoprolol succinate (TOPROL-XL) 25 MG 24 hr tablet    MITIGARE 0.6 mg Cap    multivitamin (THERAGRAN) per tablet    NIFEdipine (PROCARDIA-XL) 60 MG (OSM) 24 hr tablet    paricalcitoL (ZEMPLAR) 1 MCG capsule    predniSONE (DELTASONE) 5 MG tablet    vitamin D 1000 units Tab    ammonium lactate 12 % Crea    [START ON 10/21/2023] atorvastatin (LIPITOR) 40 MG tablet    [START ON  10/21/2023] clopidogreL (PLAVIX) 75 mg tablet    ferrous sulfate 325 (65 FE) MG EC tablet    fluticasone propion-salmeterol 115-21 mcg/dose (ADVAIR HFA) 115-21 mcg/actuation HFAA inhaler    fluticasone propionate (FLONASE) 50 mcg/actuation nasal spray    gabapentin (NEURONTIN) 100 MG capsule    [START ON 10/21/2023] isosorbide mononitrate (IMDUR) 30 MG 24 hr tablet    nitroGLYCERIN (NITROSTAT) 0.4 MG SL tablet    omeprazole (PRILOSEC) 20 MG capsule    pulse oximeter (PULSE OXIMETER) device    tacrolimus (PROGRAF) 1 MG Cap       Objective:     Vital Signs (Most Recent):  Temp: 97.9 °F (36.6 °C) (10/20/23 1136)  Pulse: (!) 54 (10/20/23 1136)  Resp: 18 (10/20/23 1136)  BP: (!) 129/58 (10/20/23 1136)  SpO2: (!) 93 % (10/20/23 1136) Vital Signs (24h Range):  Temp:  [97.9 °F (36.6 °C)-98.3 °F (36.8 °C)] 97.9 °F (36.6 °C)  Pulse:  [53-74] 54  Resp:  [18-22] 18  SpO2:  [93 %-98 %] 93 %  BP: (129-158)/(58-68) 129/58     Weight: 93.4 kg (205 lb 14.6 oz) (10/19/23 0400)  Body mass index is 27.17 kg/m².  Body surface area is 2.19 meters squared.    I/O last 3 completed shifts:  In: 345.8 [Blood:345.8]  Out: 550 [Urine:550]    Physical Exam  Vitals reviewed.   Constitutional:       General: He is not in acute distress.     Appearance: Normal appearance. He is not ill-appearing or toxic-appearing.   HENT:      Head: Normocephalic and atraumatic.      Right Ear: External ear normal.      Left Ear: External ear normal.      Nose: Nose normal.   Eyes:      General: No scleral icterus.     Conjunctiva/sclera: Conjunctivae normal.   Cardiovascular:      Rate and Rhythm: Normal rate and regular rhythm.      Pulses: Normal pulses.      Heart sounds: Normal heart sounds. No murmur heard.     No friction rub.   Pulmonary:      Effort: Pulmonary effort is normal. No respiratory distress.      Breath sounds: Normal breath sounds. No wheezing or rhonchi.   Abdominal:      General: Bowel sounds are normal. There is no distension.       Palpations: Abdomen is soft.      Tenderness: There is no abdominal tenderness. There is no guarding.   Musculoskeletal:         General: No swelling or deformity. Normal range of motion.      Cervical back: Normal range of motion and neck supple. No tenderness.      Right lower leg: No edema.      Left lower leg: No edema.   Skin:     General: Skin is warm and dry.      Coloration: Skin is not jaundiced.      Findings: No erythema or rash.   Neurological:      General: No focal deficit present.      Mental Status: He is alert and oriented to person, place, and time.      Motor: No weakness.   Psychiatric:         Mood and Affect: Mood normal.         Behavior: Behavior normal.         Assessment/Plan:   67 yr old AAM with hx of kidney transplant in 2005 (bcrof 2.8-3.2), COPD, CAD s/p CABG in 2006 with active smoking, AFIB on amiodarone/Eliquis, HTN who is admitted for shortness of breath as well as dyspnea on exertion, Palliative care consulted yesterday for goals of care discussion, he states he has had enough dialysis and does not want to pursue dialysis now, but may consider it in the future after we discussed the risks of contrast by cardiology    1) Community acquired pneumonia  Treated by primary team, reviewed medication interreactions    2) KATINA  on CKD G4A2 of kidney transplant  Most likely multifactorial, both cardiorenal, natural progression of CKD, and anemia that is symptomatic, recommend transfusion today for Hgb closer to 8.0  - baseline creatinine 2.8 to 3.2  - Cr today:  Lab Results   Component Value Date    CREATININE 5.4 (H) 10/20/2023   - need accurate I/O so we can make thoughtful assessments and make safe clinical plans, thanks    3) Immunosuppression  Tacrolimus Lvl (ng/mL)   Date Value   10/20/2023 2.2 (L)   - Stopped taking Cellcept roughly a year ago per Dr. Diaz/Dr. Webb  - Tacrolimus goal between 4 and 6, continue 3 mg twice daily  - monitor for drug toxicity and interactions with  other therapies daily    - Level low today (10/20) due to missing PM dose last night    4) Hypertension  Not at goal of near 120/70 while in the hospital  - had CP that appears to be Angina, recommend cardiology manage for medical optimization, not sure he is a candidate for angiography  - recommend stopping Nifedipine and Hydralazine if possible   - does not appear clinically hypervolemic, will assess daily for diuretic need    5) Acid Base and Electrolytes  - NAGMA, mild   - Continue sodium bicarbonate 650 mg twice daily    6) Anemia of CKD  - Iron studies, Fe 34, % sat 19, Transferrin 121, TIBC 179, Ferritin 701, recommend IV iron 1 gram divide doses QOD, oral iron may not be sufficient with CKD4/5,     - Transfused 1 UPRBC 10/16/23, 1u 10/18  - S/p 4.7k units Epo on 10/18  - Transfuse Hgb less than 7.0, consider higher threshold of 8.0 in the setting of CAD  - Would recommend Epo 10K units weekly    7) CKD-MBD  Hyperphosphatemia, recommend dietary management at this time, consider phosphate binder if greater than 5.5  - last iPTH 9/23 was 32.9  - Vitamin D 1,25 10, D ,25 Hydroxy level 72 on 1/23    - Ca low; would resume home Paricalcitol and TUMS    We will continue to follow. Please call if you have any questions.     Jose Manuel Gray MD  PGY-5  Nephrology

## 2023-10-20 NOTE — PLAN OF CARE
Problem: Adult Inpatient Plan of Care  Goal: Plan of Care Review  Outcome: Met  Goal: Patient-Specific Goal (Individualized)  Outcome: Met  Goal: Absence of Hospital-Acquired Illness or Injury  Outcome: Met  Goal: Optimal Comfort and Wellbeing  Outcome: Met  Goal: Readiness for Transition of Care  Outcome: Met     Problem: Fluid and Electrolyte Imbalance (Acute Kidney Injury/Impairment)  Goal: Fluid and Electrolyte Balance  Outcome: Met     Problem: Oral Intake Inadequate (Acute Kidney Injury/Impairment)  Goal: Optimal Nutrition Intake  Outcome: Met     Problem: Renal Function Impairment (Acute Kidney Injury/Impairment)  Goal: Effective Renal Function  Outcome: Met     Problem: Pain Acute  Goal: Acceptable Pain Control and Functional Ability  Outcome: Met     Problem: Coping Ineffective  Goal: Effective Coping  Outcome: Met     Problem: Fall Injury Risk  Goal: Absence of Fall and Fall-Related Injury  Outcome: Met   Patient AAOx4. No apparent signs of distress noted at this time. Patient is ambulating independently. Reports pain in left AC at the site of a discontinued. MD notified. Recommends warm compresses and OTC pain meds. Discharge instructions reviewed and understanding verbalized. Patient dc home with spouse. Home medications delivered to bedside by pharmacy. Patient is to follow up with PCP.

## 2023-10-20 NOTE — PLAN OF CARE
CHW scheduled pt's hospital f/u visit on 10/27/23 @ 9:20 am.   sent a message to Cardiology's nurse to make contact with pt to schedule his hospital f/u appointment.

## 2023-10-20 NOTE — PLAN OF CARE
CHW met with patient/family at bedside. Patient experience rounding completed and reviewed the following.     Do you know your discharge plan? Yes,    If yes, what is the plan? Home    Have you discussed your needs and preferences with your SW/CM? Yes     If you are discharging home, do you have help at home? Yes, spouse    Do you think you will need help additional at home at discharge? No    Do you currently have difficulty keeping up with bills, affording medicine or buying food? No    Assigned SW/CM notified of any patient/family needs or concerns. Appropriate resources provided to address patient's needs.

## 2023-10-20 NOTE — DISCHARGE SUMMARY
"[facility] - Telemetry [facility] Medicine  Discharge Summary      Patient Name: Ibrahima Phelps  MRN: [id number]  SHAVONNE: [id number]  Patient Class: IP- Inpatient  Admission Date: 10/13/2023  Hospital Length of Stay: 7 days  Discharge Date and Time:  10/20/2023 2:51 PM  Attending Physician: Yessy att. providers found   Discharging Provider: Randal Beaver MD  Primary Care Provider: Anatoliy Colindres MD  [facility] Medicine Team: [facility] 5 Randal Beaver MD  Primary Care Team: [facility]    HPI:   66 yo M active smoker w/ COPD, CAD s/p CABG x1 in 2006, paroxysmal Afib s/p cardioversion 3/2022 (on Eliquis, Metoprolol, and Amiodarone), HFpEF (EF 65%; not currently taking diuretics), hiatal hernia with reflux, HTN on Hydralazine, CKD4 s/p kidney transplants in 1992 and 2005 on Tacrolimus and Prednisone, presented to the ED with his wife complaining about worsening shortness of breath/DAUGHERTY; infrequent, nonproductive cough; and severe, sharp L posterior heel pain that started a few days ago. Per the patient, he woke up last night "gasping for air," short of breath, and with substernal chest pain radiating to his back. The chest pain resolved with Omeprazole; denies chest pain at this time. The shortness of breath and infrequent, nonproductive cough typically occur at rest and are worse with walking. The severe, sharp L posterior heel pain is worse with movement and walking, and has not responded to leg elevation. Of note, he has a history of tenosynovitis. He noted significant, watery diarrhea associated with stomach pain all day yesterday that resolved with Peptobismol and water; denies diarrhea this morning. Denies fevers, chills, nausea, vomiting, constipation, dysuria, hematuria, palpitations, headache, or vision changes.     His presenting vital signs were a temperature of 98.3, pulse rate of 57-84 bpm, resp rate of 20-28, blood pressure ranging from 156-169/69-74, MAP of 104-107, and SpO2 % on RA. Troponin " "(0.081), Magnesium (nml), CBC (Hgb 8.2, platelets 127), CMP (CO2 19, BUN 33, Cr 4.1), and BNP (1,168) were ordered. CXR showed RLL consolidation. XR L ankle showed calcaneal enthesopathy. CTAP showed trace bilateral pleural effusions, bibasilar dependent pulmonary opacities, new prominence/thickening of the L iliac fossa transplant kidney's ureter. Started on CTX, AZT, and Lasix      * No surgery found *      Hospital Course:   Patient admitted for further management. Patient continued having chest pain, cardiology consulted. Patient found to have elevated PA pressures concerning for pHTN which was likely contributing to shortness of breath. KTM consulted and recommended stopping lasix. Pt continuing to have chest pain in hospital, Cardiology following, initiated ACS protocol with plans to do a PET stress to assess for Ischemia, though patient not presently a candidate for LHC given CKD5. Pt not amenable to dialysis. Pt transfused 1 unit of pRBCs during admission for Hgb <7. Palliative consulted for GOC but ultimately patient was discharged home with instructions to follow up closely with KTM and cardiology.     BP (!) 129/58 (BP Location: Right arm, Patient Position: Lying)   Pulse (!) 54   Temp 97.9 °F (36.6 °C) (Oral)   Resp 18   Ht 6' 1" (1.854 m)   Wt 93.4 kg (205 lb 14.6 oz)   SpO2 (!) 93%   BMI 27.17 kg/m²      Physical Exam  Constitutional:       General: He is not in acute distress.     Appearance: He is not ill-appearing.   HENT:      Head: Normocephalic and atraumatic.      Right Ear: External ear normal.      Left Ear: External ear normal.      Nose: Nose normal. No congestion or rhinorrhea.      Mouth/Throat:      Mouth: Mucous membranes are dry.      Pharynx: Oropharynx is clear. No oropharyngeal exudate or posterior oropharyngeal erythema.   Eyes:      General: No scleral icterus.        Right eye: No discharge.         Left eye: No discharge.      Extraocular Movements: Extraocular movements " intact.      Conjunctiva/sclera: Conjunctivae normal.   Cardiovascular:      Rate and Rhythm: Normal rate and regular rhythm.      Pulses: Normal pulses.      Heart sounds: Normal heart sounds. No murmur heard.     No gallop.   Pulmonary:      Effort: No respiratory distress.      Breath sounds: Normal breath sounds. No stridor. No wheezing or rales.   Chest:      Chest wall: No tenderness.   Abdominal:      General: Abdomen is flat. Bowel sounds are normal. There is no distension.      Palpations: Abdomen is soft. There is no mass.      Tenderness: There is no abdominal tenderness. There is no guarding or rebound.      Hernia: No hernia is present.   Musculoskeletal:         General: No swelling, tenderness or deformity. Normal range of motion.      Cervical back: Normal range of motion. No rigidity or tenderness.      Right lower leg: Edema present.      Left lower leg: Edema present.   Skin:     General: Skin is warm and dry.      Capillary Refill: Capillary refill takes less than 2 seconds.      Coloration: Skin is not jaundiced.      Findings: No bruising, erythema or rash.   Neurological:      General: No focal deficit present.      Mental Status: He is alert and oriented to person, place, and time.      Cranial Nerves: No cranial nerve deficit.      Sensory: No sensory deficit.      Motor: No weakness.   Psychiatric:         Mood and Affect: Mood normal.         Behavior: Behavior normal.         Thought Content: Thought content normal.         Judgment: Judgment normal.           Goals of Care Treatment Preferences:  Code Status: Full Code    Health care agent: Pt's wife is CRISTY  Health care agent number: No value filed.          What is most important right now is to focus on spending time at home, remaining as independent as possible, symptom/pain control, quality of life, even if it means sacrificing a little time.  Accordingly, we have decided that the best plan to meet the patient's goals includes  continuing with treatment.      Consults:   Consults (From admission, onward)        Status Ordering Provider     Inpatient consult to Palliative Care  Once        Provider:  (Not yet assigned)    Completed TUTU WOO     Inpatient consult to Cardiology  Once        Provider:  (Not yet assigned)    Completed TUTU WOO     Inpatient consult to Midline team  Once        Provider:  (Not yet assigned)    Completed SHANTE WALL     Inpatient consult to Kidney/Pancreas Transplant Medicine  Once        Provider:  (Not yet assigned)    Acknowledged TUTU WOO          No new Assessment & Plan notes have been filed under this hospital service since the last note was generated.  Service: Hospital Medicine    Final Active Diagnoses:    Diagnosis Date Noted POA    PRINCIPAL PROBLEM:  Unstable angina pectoris [I20.0] 10/14/2023 No    Advanced care planning/counseling discussion [Z71.89] 10/18/2023 Not Applicable    Chest pain [R07.9] 10/15/2023 Yes    CKD (chronic kidney disease), stage IV [N18.4] 10/13/2023 Unknown    Consolidation lung [J18.1] 10/13/2023 Yes    Anemia in stage 4 chronic kidney disease [N18.4, D63.1] 08/30/2023 Yes     Chronic    Acute on chronic heart failure with preserved ejection fraction [I50.33] 03/17/2023 Yes    Immunodeficiency due to treatment with immunosuppressive medication [D84.821, Z79.899] 03/17/2023 Not Applicable     Chronic    Palliative care encounter [Z51.5] 03/01/2023 Not Applicable    Goals of care, counseling/discussion [Z71.89] 03/01/2023 Not Applicable    Pulmonary HTN [I27.20] 02/10/2023 Yes     Chronic    Chronic obstructive pulmonary disease [J44.9] 10/01/2019 Yes     Chronic    Paroxysmal atrial fibrillation [I48.0] 09/25/2017 Yes     Chronic    H/O kidney transplant [Z94.0] 07/10/2015 Not Applicable     Chronic    Long term (current) use of anticoagulants [V58.61] [Z79.01] 05/04/2015 Not Applicable     Chronic    SOB (shortness of breath) [R06.02]  04/30/2015 Yes    CAD (coronary artery disease) [I25.10]  Yes     Chronic    Mixed hyperlipidemia [E78.2]  Yes     Chronic    Thrombocytopenia [D69.6]  Yes      Problems Resolved During this Admission:       Discharged Condition: stable    Disposition: Home or Self Care    Follow Up:    Patient Instructions:      BASIC METABOLIC PANEL   Standing Status: Future Standing Exp. Date: 12/18/24     Ambulatory referral/consult to Outpatient Case Management   Referral Priority: Routine Referral Type: Consultation   Referral Reason: Specialty Services Required   Number of Visits Requested: 1     Ambulatory referral/consult to Heart Failure Transitional Care Clinic   Standing Status: Future   Referral Priority: Routine Referral Type: Consultation   Referral Reason: Specialty Services Required   Requested Specialty: Cardiology   Number of Visits Requested: 1     Ambulatory referral/consult to Transplant, Kidney   Standing Status: Future   Referral Priority: Urgent Referral Type: Transplants   Number of Visits Requested: 1     Ambulatory referral/consult to Cardiology   Standing Status: Future   Referral Priority: Routine Referral Type: Consultation   Referral Reason: Specialty Services Required   Requested Specialty: Cardiology   Number of Visits Requested: 1       Significant Diagnostic Studies: Labs: All labs within the past 24 hours have been reviewed    Pending Diagnostic Studies:     None         Medications:  Reconciled Home Medications:      Medication List      START taking these medications    clopidogreL 75 mg tablet  Commonly known as: PLAVIX  Take 1 tablet (75 mg total) by mouth once daily.  Start taking on: October 21, 2023     ferrous sulfate 325 (65 FE) MG EC tablet  Take 1 tablet (325 mg total) by mouth once daily.     isosorbide mononitrate 30 MG 24 hr tablet  Commonly known as: IMDUR  Take 2 tablets (60 mg total) by mouth once daily.  Start taking on: October 21, 2023     nitroGLYCERIN 0.4 MG SL  tablet  Commonly known as: NITROSTAT  Place 1 tablet (0.4 mg total) under the tongue every 5 (five) minutes as needed for Chest pain.        CHANGE how you take these medications    atorvastatin 40 MG tablet  Commonly known as: LIPITOR  Take 1 tablet (40 mg total) by mouth once daily.  Start taking on: October 21, 2023  What changed:   · medication strength  · how much to take  · when to take this     metoprolol succinate 25 MG 24 hr tablet  Commonly known as: TOPROL-XL  Take 1 tablet (25 mg total) by mouth once daily.  What changed: how much to take     tacrolimus 1 MG Cap  Commonly known as: PROGRAF  Take 3 capsules (3 mg total) by mouth every 12 (twelve) hours.  What changed: See the new instructions.        CONTINUE taking these medications    acetaminophen 500 MG tablet  Commonly known as: TYLENOL  Take 1 tablet (500 mg total) by mouth every 6 (six) hours as needed for Pain.     ADVAIR -21 mcg/actuation Hfaa inhaler  Generic drug: fluticasone propion-salmeterol 115-21 mcg/dose  Inhale 2 puffs into the lungs every 12 (twelve) hours. Controller     * albuterol 90 mcg/actuation inhaler  Commonly known as: VENTOLIN HFA  Inhale 2 puffs into the lungs every 6 (six) hours as needed for Wheezing or Shortness of Breath. Rescue     * albuterol 2.5 mg /3 mL (0.083 %) nebulizer solution  Commonly known as: PROVENTIL  Take 3 mLs (2.5 mg total) by nebulization every 6 (six) hours as needed for Wheezing. Rescue     ALLEGRA ORAL  Take 1 tablet by mouth daily as needed (allergies).     amiodarone 200 MG Tab  Commonly known as: PACERONE  Take 1 tablet (200 mg total) by mouth once daily.     ammonium lactate 12 % Crea  Apply 1 g topically once daily.     apixaban 5 mg Tab  Commonly known as: ELIQUIS  Take 1 tablet (5 mg total) by mouth 2 (two) times daily.     b complex vitamins tablet  Commonly known as: B COMPLEX-VITAMIN B12  Take 1 tablet by mouth once daily.     CALCIUM 600 ORAL  Take 1 tablet by mouth 2 (two) times a  day.     doxazosin 4 MG tablet  Commonly known as: CARDURA  Take 1 tablet (4 mg total) by mouth every 12 (twelve) hours.     famotidine 20 MG tablet  Commonly known as: PEPCID  Take 1 tablet (20 mg total) by mouth 2 (two) times daily.     fluticasone propionate 50 mcg/actuation nasal spray  Commonly known as: FLONASE  1 spray (50 mcg total) by Each Nostril route daily as needed for Allergies.     gabapentin 100 MG capsule  Commonly known as: NEURONTIN  Take 1 capsule (100 mg total) by mouth as needed (pain).     hydrALAZINE 50 MG tablet  Commonly known as: APRESOLINE  TAKE 1 TABLET (50 MG TOTAL) BY MOUTH 3 (THREE) TIMES DAILY.     MITIGARE 0.6 mg Cap  Generic drug: colchicine (gout)  TAKE 1 CAPSULE BY MOUTH TWICE A DAY AS NEEDED GOUT FLARE UP TO 3 DAYS     multivitamin per tablet  Commonly known as: THERAGRAN  Take 1 tablet by mouth Daily.     omeprazole 20 MG capsule  Commonly known as: PRILOSEC  Take 1 capsule (20 mg total) by mouth daily as needed (heartburn).     paricalcitoL 1 MCG capsule  Commonly known as: ZEMPLAR  TAKE 1 CAPSULE ON MONDAY, WEDNESDAY AND FRIDAY     predniSONE 5 MG tablet  Commonly known as: DELTASONE  TAKE 1 TABLET BY MOUTH EVERY DAY IN THE MORNING     pulse oximeter device  Commonly known as: pulse oximeter  by Apply Externally route 2 (two) times a day. Use twice daily at 8 AM and 3 PM and record the value in [employer] as directed.     vitamin D 1000 units Tab  Commonly known as: VITAMIN D3  Take 370 mg by mouth 2 (two) times daily. 2 tablets BID         * This list has 2 medication(s) that are the same as other medications prescribed for you. Read the directions carefully, and ask your doctor or other care provider to review them with you.            STOP taking these medications    oxyCODONE 5 MG immediate release tablet  Commonly known as: ROXICODONE        ASK your doctor about these medications    furosemide 40 MG tablet  Commonly known as: LASIX  Take 0.5 tablets (20 mg total) by mouth  2 (two) times daily. HOLD this medication on discharge per nephrology recs, and resume only once needed for lower extremity edema.     NIFEdipine 60 MG (OSM) 24 hr tablet  Commonly known as: PROCARDIA-XL  Take 1 tablet (60 mg total) by mouth 2 (two) times a day.            Indwelling Lines/Drains at time of discharge:   Lines/Drains/Airways     None                 Time spent on the discharge of patient: 35 minutes         Randal Beaver MD  Department of Hospital Medicine  [facility] - Telemetry Stepdown

## 2023-10-21 NOTE — PLAN OF CARE
[facility] - Telemetry Stepdown  Discharge Final Note    Primary Care Provider: Anatoliy Colindres MD    Expected Discharge Date: 10/20/2023    Patient is ready to discharge from case management standpoint.    Final Discharge Note (most recent)       Final Note - 10/20/23 1420          Final Note    Assessment Type Final Discharge Note     Anticipated Discharge Disposition Home or Self Care     What phone number can be called within the next 1-3 days to see how you are doing after discharge? [phone]     Hospital Resources/Appts/Education Provided Provided patient/caregiver with written discharge plan information;Appointments scheduled and added to AVS        Post-Acute Status    Coverage [employer] Medicare     Discharge Delays None known at this time                   Important Message from Medicare  Important Message from Medicare regarding Discharge Appeal Rights: Given to patient/caregiver, Explained to patient/caregiver, Signed/date by patient/caregiver     Date IMM was signed: 10/20/23  Time IMM was signed: 1128      Lianne Rubin RN  [facility]  - [facility]  Spectralink: [phone]

## 2023-10-21 NOTE — PLAN OF CARE
CM attended nursing huddle for patient updates and review discharge plan. Patient is to discharge home with no post-acute needs. RAMU is set for 10/20/23. CM will coordinate discharge with bedside nurse.      Lianne Rubin RN  Weekend  - [facility] [address]  Spectralink: [phone]

## 2023-10-23 ENCOUNTER — PATIENT OUTREACH (OUTPATIENT)
Dept: ADMINISTRATIVE | Facility: CLINIC | Age: 68
End: 2023-10-23
Payer: MEDICARE

## 2023-10-23 NOTE — PROGRESS NOTES
C3 nurse spoke with Ibrahima Phelps and his spouse for a TCC post hospital discharge follow up call. Pt states he is having some lightheadedness today and that he will call his PCP or OOC for any continued or worsening symptoms. Pt denies being transferred to OOC at this moment.     Pts spouse has two questions about meds that the pt was instructed to 'ask his doctor about' on the discharge paperwork. She is messaging the pts nephrologist about lasix bc it was ordered to hold med and resume only once needed for lower extremity edema which the pt states she was already doing. She wants to speak with Anatoliy Colindres MD about whether to resume the pts nifedipine.      The patient has a scheduled HOSFU with Art Churchill II, MD on 10/27/23 at 0920. Message routed to Anatoliy Colindres MD.

## 2023-10-24 ENCOUNTER — TELEPHONE (OUTPATIENT)
Dept: TRANSPLANT | Facility: CLINIC | Age: 68
End: 2023-10-24
Payer: MEDICARE

## 2023-10-24 NOTE — TELEPHONE ENCOUNTER
Contacted patient to review initial intake information. Patient reports the followin. Can you walk up a flight of stairs without getting short of breath or stopping? no  2. Can you walk one block without getting short of breath or having to stop? no   3. Do you use oxygen? no  4. Do you use a cane, walker, or wheel chair to assist in mobility? no  5. Have you been hospitalized or had recent surgery in the last 6 months? no   A. Stroke   B. Heart surgery or heart catheterization   C. Broken bone  6. Do you have any cuts, open sores (ulcers), or wounds anywhere on your body? No   7. Do you go to dialysis? No   8. Preferred appointment day? Anyday  9. Caregiver? Wife (Shea)    Patient is aware the next steps will include completing records and compliance verification. Patient is aware once provider review and insurance authorization is received we will contact patient to schedule initial visit. Patient is aware that initial visit will begin prior to / at 7 am and will conclude at approximately 3 pm on date of appointment. All questions answered at this time.

## 2023-10-24 NOTE — LETTER
October 24, 2023    Randal Beaver  [address]  [place] [address]  Phone: [phone]  Fax: [phone]    Dear Dr. Randal Beaver:    Patient: Ibrahima Phelps  MR Number [id number]  Date of Birth 1955    Thank you for your referral of Ibrahima Phelps to our transplant program.      Your patient's information will be screened by our Referral Nurse Coordinators to determine initial medical candidacy and if any further records are required. We will contact you if further information is needed to process the referral.     Once all needed information is received it will be forwarded to our Transplant Financial Coordinators who will obtain insurance authorization. Upon insurance approval, your patient will be contacted by our  to schedule their appointments with the transplant team. When appointments are made you will receive a copy of the appointment letter that your patient will receive.     This process may take two to six weeks to complete.     In the event your patient is not a candidate a copy of our transplant programs opinion including reasons will be returned to you to comply with your yearly review regulations. A copy of that letter will also be sent to the patient.     The following information is needed to process a referral:  Medicare form 2728 for all patients on dialysis.  Dental clearance is required if your patient has primary Medicaid.  Current immunization record.  This information can be faxed to [phone].  Current Dietician evaluation can be faxed to [phone].    Thank you again for your trust in our program and the care of your patient.  If there is anything we can do for you or your staff, please feel free to contact us at [phone].    Sincerely,   [facility]  Kidney & Kidney/Pancreas Transplant Team  [address] [place] [address] [phone]

## 2023-10-25 ENCOUNTER — HOSPITAL ENCOUNTER (INPATIENT)
Facility: HOSPITAL | Age: 68
LOS: 2 days | Discharge: HOME | DRG: 303 | End: 2023-10-27
Attending: EMERGENCY MEDICINE | Admitting: HOSPITALIST
Payer: MEDICARE

## 2023-10-25 ENCOUNTER — TELEPHONE (OUTPATIENT)
Dept: TRANSPLANT | Facility: CLINIC | Age: 68
End: 2023-10-25
Payer: MEDICARE

## 2023-10-25 DIAGNOSIS — N18.4 CKD (CHRONIC KIDNEY DISEASE), STAGE IV: ICD-10-CM

## 2023-10-25 DIAGNOSIS — I50.1 ACUTE PULMONARY EDEMA WITH CONGESTIVE HEART FAILURE: ICD-10-CM

## 2023-10-25 DIAGNOSIS — N18.4 ANEMIA IN STAGE 4 CHRONIC KIDNEY DISEASE: Chronic | ICD-10-CM

## 2023-10-25 DIAGNOSIS — R07.9 CHEST PAIN: ICD-10-CM

## 2023-10-25 DIAGNOSIS — Z94.0 H/O KIDNEY TRANSPLANT: Chronic | ICD-10-CM

## 2023-10-25 DIAGNOSIS — I50.33 ACUTE ON CHRONIC HEART FAILURE WITH PRESERVED EJECTION FRACTION: ICD-10-CM

## 2023-10-25 DIAGNOSIS — D84.9 IMMUNOSUPPRESSION: ICD-10-CM

## 2023-10-25 DIAGNOSIS — R06.02 SOB (SHORTNESS OF BREATH): Primary | ICD-10-CM

## 2023-10-25 DIAGNOSIS — I20.0 UNSTABLE ANGINA PECTORIS: ICD-10-CM

## 2023-10-25 DIAGNOSIS — D63.1 ANEMIA IN STAGE 4 CHRONIC KIDNEY DISEASE: Chronic | ICD-10-CM

## 2023-10-25 LAB
ALBUMIN SERPL BCP-MCNC: 3 G/DL (ref 3.5–5.2)
ALP SERPL-CCNC: 78 U/L (ref 55–135)
ALT SERPL W/O P-5'-P-CCNC: 22 U/L (ref 10–44)
ANION GAP SERPL CALC-SCNC: 15 MMOL/L (ref 8–16)
AST SERPL-CCNC: 29 U/L (ref 10–40)
BASOPHILS # BLD AUTO: 0.02 K/UL (ref 0–0.2)
BASOPHILS NFR BLD: 0.2 % (ref 0–1.9)
BILIRUB SERPL-MCNC: 0.4 MG/DL (ref 0.1–1)
BNP SERPL-MCNC: 864 PG/ML (ref 0–99)
BUN SERPL-MCNC: 47 MG/DL (ref 8–23)
CALCIUM SERPL-MCNC: 7.8 MG/DL (ref 8.7–10.5)
CHLORIDE SERPL-SCNC: 110 MMOL/L (ref 95–110)
CO2 SERPL-SCNC: 17 MMOL/L (ref 23–29)
CREAT SERPL-MCNC: 5.4 MG/DL (ref 0.5–1.4)
DIFFERENTIAL METHOD: ABNORMAL
EOSINOPHIL # BLD AUTO: 0.5 K/UL (ref 0–0.5)
EOSINOPHIL NFR BLD: 5.1 % (ref 0–8)
ERYTHROCYTE [DISTWIDTH] IN BLOOD BY AUTOMATED COUNT: 19 % (ref 11.5–14.5)
EST. GFR  (NO RACE VARIABLE): 10.8 ML/MIN/1.73 M^2
GLUCOSE SERPL-MCNC: 94 MG/DL (ref 70–110)
HCT VFR BLD AUTO: 28.8 % (ref 40–54)
HGB BLD-MCNC: 9.3 G/DL (ref 14–18)
IMM GRANULOCYTES # BLD AUTO: 0.03 K/UL (ref 0–0.04)
IMM GRANULOCYTES NFR BLD AUTO: 0.3 % (ref 0–0.5)
LYMPHOCYTES # BLD AUTO: 1.6 K/UL (ref 1–4.8)
LYMPHOCYTES NFR BLD: 16.7 % (ref 18–48)
MCH RBC QN AUTO: 25.4 PG (ref 27–31)
MCHC RBC AUTO-ENTMCNC: 32.3 G/DL (ref 32–36)
MCV RBC AUTO: 79 FL (ref 82–98)
MONOCYTES # BLD AUTO: 0.8 K/UL (ref 0.3–1)
MONOCYTES NFR BLD: 7.8 % (ref 4–15)
NEUTROPHILS # BLD AUTO: 6.7 K/UL (ref 1.8–7.7)
NEUTROPHILS NFR BLD: 69.9 % (ref 38–73)
NRBC BLD-RTO: 0 /100 WBC
PLATELET # BLD AUTO: 223 K/UL (ref 150–450)
PMV BLD AUTO: 11.8 FL (ref 9.2–12.9)
POC CARDIAC TROPONIN I: 0.02 NG/ML (ref 0–0.08)
POC CARDIAC TROPONIN I: 0.04 NG/ML (ref 0–0.08)
POC CARDIAC TROPONIN I: 0.04 NG/ML (ref 0–0.08)
POTASSIUM SERPL-SCNC: 4.5 MMOL/L (ref 3.5–5.1)
PROT SERPL-MCNC: 7.3 G/DL (ref 6–8.4)
RBC # BLD AUTO: 3.66 M/UL (ref 4.6–6.2)
SAMPLE: NORMAL
SODIUM SERPL-SCNC: 142 MMOL/L (ref 136–145)
TACROLIMUS BLD-MCNC: 2.4 NG/ML (ref 5–15)
TROPONIN I SERPL DL<=0.01 NG/ML-MCNC: 0.04 NG/ML (ref 0–0.03)
TROPONIN I SERPL DL<=0.01 NG/ML-MCNC: 0.04 NG/ML (ref 0–0.03)
TROPONIN I SERPL DL<=0.01 NG/ML-MCNC: 0.06 NG/ML (ref 0–0.03)
WBC # BLD AUTO: 9.63 K/UL (ref 3.9–12.7)

## 2023-10-25 PROCEDURE — 25000003 PHARM REV CODE 250: Mod: NTX

## 2023-10-25 PROCEDURE — 25000003 PHARM REV CODE 250: Performed by: EMERGENCY MEDICINE

## 2023-10-25 PROCEDURE — 96376 TX/PRO/DX INJ SAME DRUG ADON: CPT

## 2023-10-25 PROCEDURE — 96375 TX/PRO/DX INJ NEW DRUG ADDON: CPT

## 2023-10-25 PROCEDURE — G0378 HOSPITAL OBSERVATION PER HR: HCPCS | Mod: NTX

## 2023-10-25 PROCEDURE — 25000003 PHARM REV CODE 250

## 2023-10-25 PROCEDURE — 80053 COMPREHEN METABOLIC PANEL: CPT | Mod: NTX | Performed by: EMERGENCY MEDICINE

## 2023-10-25 PROCEDURE — 25000003 PHARM REV CODE 250: Performed by: HOSPITALIST

## 2023-10-25 PROCEDURE — 63600175 PHARM REV CODE 636 W HCPCS: Mod: NTX | Performed by: HOSPITALIST

## 2023-10-25 PROCEDURE — 20600001 HC STEP DOWN PRIVATE ROOM

## 2023-10-25 PROCEDURE — 84484 ASSAY OF TROPONIN QUANT: CPT | Mod: 91,NTX | Performed by: EMERGENCY MEDICINE

## 2023-10-25 PROCEDURE — 93005 ELECTROCARDIOGRAM TRACING: CPT | Mod: NTX

## 2023-10-25 PROCEDURE — 25000003 PHARM REV CODE 250: Mod: NTX | Performed by: NURSE PRACTITIONER

## 2023-10-25 PROCEDURE — 96366 THER/PROPH/DIAG IV INF ADDON: CPT

## 2023-10-25 PROCEDURE — 25000003 PHARM REV CODE 250: Performed by: NURSE PRACTITIONER

## 2023-10-25 PROCEDURE — 93010 ELECTROCARDIOGRAM REPORT: CPT | Mod: NTX,,, | Performed by: INTERNAL MEDICINE

## 2023-10-25 PROCEDURE — 99215 PR OFFICE/OUTPT VISIT, EST, LEVL V, 40-54 MIN: ICD-10-PCS | Mod: GC,NTX,, | Performed by: INTERNAL MEDICINE

## 2023-10-25 PROCEDURE — 96365 THER/PROPH/DIAG IV INF INIT: CPT

## 2023-10-25 PROCEDURE — 25000003 PHARM REV CODE 250: Mod: NTX | Performed by: HOSPITALIST

## 2023-10-25 PROCEDURE — 99285 EMERGENCY DEPT VISIT HI MDM: CPT | Mod: 25

## 2023-10-25 PROCEDURE — 63600175 PHARM REV CODE 636 W HCPCS: Mod: NTX

## 2023-10-25 PROCEDURE — 85025 COMPLETE CBC W/AUTO DIFF WBC: CPT | Mod: NTX | Performed by: EMERGENCY MEDICINE

## 2023-10-25 PROCEDURE — 93010 EKG 12-LEAD: ICD-10-PCS | Mod: NTX,,, | Performed by: INTERNAL MEDICINE

## 2023-10-25 PROCEDURE — 83880 ASSAY OF NATRIURETIC PEPTIDE: CPT | Mod: NTX | Performed by: EMERGENCY MEDICINE

## 2023-10-25 PROCEDURE — 25000003 PHARM REV CODE 250: Mod: NTX | Performed by: EMERGENCY MEDICINE

## 2023-10-25 PROCEDURE — 80197 ASSAY OF TACROLIMUS: CPT | Mod: NTX | Performed by: EMERGENCY MEDICINE

## 2023-10-25 PROCEDURE — 94761 N-INVAS EAR/PLS OXIMETRY MLT: CPT | Mod: NTX

## 2023-10-25 PROCEDURE — 84484 ASSAY OF TROPONIN QUANT: CPT | Mod: 91,NTX

## 2023-10-25 PROCEDURE — 63600175 PHARM REV CODE 636 W HCPCS

## 2023-10-25 PROCEDURE — 99215 OFFICE O/P EST HI 40 MIN: CPT | Mod: GC,NTX,, | Performed by: INTERNAL MEDICINE

## 2023-10-25 RX ORDER — CLOPIDOGREL BISULFATE 75 MG/1
75 TABLET ORAL DAILY
Status: DISCONTINUED | OUTPATIENT
Start: 2023-10-26 | End: 2023-10-25

## 2023-10-25 RX ORDER — HYDRALAZINE HYDROCHLORIDE 25 MG/1
50 TABLET, FILM COATED ORAL
Status: COMPLETED | OUTPATIENT
Start: 2023-10-25 | End: 2023-10-25

## 2023-10-25 RX ORDER — POLYETHYLENE GLYCOL 3350 17 G/17G
17 POWDER, FOR SOLUTION ORAL DAILY PRN
Status: DISCONTINUED | OUTPATIENT
Start: 2023-10-25 | End: 2023-10-27 | Stop reason: HOSPADM

## 2023-10-25 RX ORDER — CLOPIDOGREL BISULFATE 75 MG/1
75 TABLET ORAL DAILY
Status: DISCONTINUED | OUTPATIENT
Start: 2023-10-25 | End: 2023-10-27 | Stop reason: HOSPADM

## 2023-10-25 RX ORDER — TALC
6 POWDER (GRAM) TOPICAL NIGHTLY PRN
Status: DISCONTINUED | OUTPATIENT
Start: 2023-10-25 | End: 2023-10-27 | Stop reason: HOSPADM

## 2023-10-25 RX ORDER — BISACODYL 10 MG
10 SUPPOSITORY, RECTAL RECTAL DAILY PRN
Status: DISCONTINUED | OUTPATIENT
Start: 2023-10-25 | End: 2023-10-27 | Stop reason: HOSPADM

## 2023-10-25 RX ORDER — IBUPROFEN 200 MG
24 TABLET ORAL
Status: DISCONTINUED | OUTPATIENT
Start: 2023-10-25 | End: 2023-10-27 | Stop reason: HOSPADM

## 2023-10-25 RX ORDER — METOPROLOL SUCCINATE 25 MG/1
25 TABLET, EXTENDED RELEASE ORAL 2 TIMES DAILY
Status: DISCONTINUED | OUTPATIENT
Start: 2023-10-26 | End: 2023-10-27 | Stop reason: HOSPADM

## 2023-10-25 RX ORDER — ACETAMINOPHEN 500 MG
1000 TABLET ORAL EVERY 8 HOURS PRN
Status: DISCONTINUED | OUTPATIENT
Start: 2023-10-25 | End: 2023-10-27 | Stop reason: HOSPADM

## 2023-10-25 RX ORDER — FUROSEMIDE 10 MG/ML
120 INJECTION INTRAMUSCULAR; INTRAVENOUS ONCE
Status: COMPLETED | OUTPATIENT
Start: 2023-10-25 | End: 2023-10-25

## 2023-10-25 RX ORDER — AMIODARONE HYDROCHLORIDE 200 MG/1
200 TABLET ORAL DAILY
Status: DISCONTINUED | OUTPATIENT
Start: 2023-10-26 | End: 2023-10-25

## 2023-10-25 RX ORDER — METOPROLOL TARTRATE 25 MG/1
12.5 TABLET ORAL ONCE
Status: DISCONTINUED | OUTPATIENT
Start: 2023-10-25 | End: 2023-10-27 | Stop reason: HOSPADM

## 2023-10-25 RX ORDER — PROMETHAZINE HYDROCHLORIDE 25 MG/1
25 TABLET ORAL EVERY 6 HOURS PRN
Status: DISCONTINUED | OUTPATIENT
Start: 2023-10-25 | End: 2023-10-27 | Stop reason: HOSPADM

## 2023-10-25 RX ORDER — AMIODARONE HYDROCHLORIDE 200 MG/1
200 TABLET ORAL DAILY
Status: DISCONTINUED | OUTPATIENT
Start: 2023-10-25 | End: 2023-10-27 | Stop reason: HOSPADM

## 2023-10-25 RX ORDER — SODIUM CHLORIDE 0.9 % (FLUSH) 0.9 %
10 SYRINGE (ML) INJECTION
Status: DISCONTINUED | OUTPATIENT
Start: 2023-10-25 | End: 2023-10-27 | Stop reason: HOSPADM

## 2023-10-25 RX ORDER — HYDRALAZINE HYDROCHLORIDE 50 MG/1
50 TABLET, FILM COATED ORAL EVERY 8 HOURS
Status: DISCONTINUED | OUTPATIENT
Start: 2023-10-25 | End: 2023-10-27 | Stop reason: HOSPADM

## 2023-10-25 RX ORDER — ISOSORBIDE MONONITRATE 30 MG/1
30 TABLET, EXTENDED RELEASE ORAL DAILY
Status: DISCONTINUED | OUTPATIENT
Start: 2023-10-25 | End: 2023-10-25

## 2023-10-25 RX ORDER — HYDRALAZINE HYDROCHLORIDE 25 MG/1
50 TABLET, FILM COATED ORAL 3 TIMES DAILY
Status: DISCONTINUED | OUTPATIENT
Start: 2023-10-25 | End: 2023-10-25

## 2023-10-25 RX ORDER — FAMOTIDINE 20 MG/1
20 TABLET, FILM COATED ORAL 2 TIMES DAILY
Status: DISCONTINUED | OUTPATIENT
Start: 2023-10-25 | End: 2023-10-26

## 2023-10-25 RX ORDER — GLUCAGON 1 MG
1 KIT INJECTION
Status: DISCONTINUED | OUTPATIENT
Start: 2023-10-25 | End: 2023-10-27 | Stop reason: HOSPADM

## 2023-10-25 RX ORDER — ATORVASTATIN CALCIUM 40 MG/1
40 TABLET, FILM COATED ORAL DAILY
Status: DISCONTINUED | OUTPATIENT
Start: 2023-10-25 | End: 2023-10-27 | Stop reason: HOSPADM

## 2023-10-25 RX ORDER — HYDRALAZINE HYDROCHLORIDE 20 MG/ML
10 INJECTION INTRAMUSCULAR; INTRAVENOUS EVERY 6 HOURS PRN
Status: DISCONTINUED | OUTPATIENT
Start: 2023-10-25 | End: 2023-10-27 | Stop reason: HOSPADM

## 2023-10-25 RX ORDER — DOXAZOSIN 2 MG/1
4 TABLET ORAL EVERY 12 HOURS
Status: DISCONTINUED | OUTPATIENT
Start: 2023-10-25 | End: 2023-10-27 | Stop reason: HOSPADM

## 2023-10-25 RX ORDER — LANOLIN ALCOHOL/MO/W.PET/CERES
1 CREAM (GRAM) TOPICAL DAILY
Status: DISCONTINUED | OUTPATIENT
Start: 2023-10-25 | End: 2023-10-27 | Stop reason: HOSPADM

## 2023-10-25 RX ORDER — PREDNISONE 5 MG/1
5 TABLET ORAL DAILY
Status: DISCONTINUED | OUTPATIENT
Start: 2023-10-25 | End: 2023-10-27 | Stop reason: HOSPADM

## 2023-10-25 RX ORDER — IBUPROFEN 200 MG
16 TABLET ORAL
Status: DISCONTINUED | OUTPATIENT
Start: 2023-10-25 | End: 2023-10-27 | Stop reason: HOSPADM

## 2023-10-25 RX ORDER — PARICALCITOL 1 UG/1
1 CAPSULE, LIQUID FILLED ORAL
Status: DISCONTINUED | OUTPATIENT
Start: 2023-10-25 | End: 2023-10-27 | Stop reason: HOSPADM

## 2023-10-25 RX ORDER — DOXAZOSIN 2 MG/1
4 TABLET ORAL DAILY
Status: DISCONTINUED | OUTPATIENT
Start: 2023-10-25 | End: 2023-10-25

## 2023-10-25 RX ORDER — LABETALOL HCL 20 MG/4 ML
10 SYRINGE (ML) INTRAVENOUS EVERY 6 HOURS PRN
Status: DISCONTINUED | OUTPATIENT
Start: 2023-10-25 | End: 2023-10-27 | Stop reason: HOSPADM

## 2023-10-25 RX ORDER — ISOSORBIDE MONONITRATE 60 MG/1
60 TABLET, EXTENDED RELEASE ORAL DAILY
Status: DISCONTINUED | OUTPATIENT
Start: 2023-10-26 | End: 2023-10-27 | Stop reason: HOSPADM

## 2023-10-25 RX ORDER — TACROLIMUS 1 MG/1
3 CAPSULE ORAL 2 TIMES DAILY
Status: DISCONTINUED | OUTPATIENT
Start: 2023-10-25 | End: 2023-10-27 | Stop reason: HOSPADM

## 2023-10-25 RX ADMIN — HYDRALAZINE HYDROCHLORIDE 10 MG: 20 INJECTION, SOLUTION INTRAMUSCULAR; INTRAVENOUS at 01:10

## 2023-10-25 RX ADMIN — SODIUM CHLORIDE 20 MG/HR: 9 INJECTION, SOLUTION INTRAVENOUS at 03:10

## 2023-10-25 RX ADMIN — FUROSEMIDE 120 MG: 10 INJECTION, SOLUTION INTRAVENOUS at 09:10

## 2023-10-25 RX ADMIN — ATORVASTATIN CALCIUM 40 MG: 40 TABLET, FILM COATED ORAL at 09:10

## 2023-10-25 RX ADMIN — LABETALOL HYDROCHLORIDE 10 MG: 5 INJECTION, SOLUTION INTRAVENOUS at 03:10

## 2023-10-25 RX ADMIN — TACROLIMUS 3 MG: 1 CAPSULE ORAL at 09:10

## 2023-10-25 RX ADMIN — TACROLIMUS 3 MG: 1 CAPSULE ORAL at 05:10

## 2023-10-25 RX ADMIN — CLOPIDOGREL BISULFATE 75 MG: 75 TABLET ORAL at 08:10

## 2023-10-25 RX ADMIN — APIXABAN 5 MG: 5 TABLET, FILM COATED ORAL at 08:10

## 2023-10-25 RX ADMIN — PARICALCITOL 1 MCG: 1 CAPSULE, LIQUID FILLED ORAL at 10:10

## 2023-10-25 RX ADMIN — HYDRALAZINE HYDROCHLORIDE 10 MG: 20 INJECTION, SOLUTION INTRAMUSCULAR; INTRAVENOUS at 08:10

## 2023-10-25 RX ADMIN — SODIUM CHLORIDE 200 MG: 9 INJECTION, SOLUTION INTRAVENOUS at 03:10

## 2023-10-25 RX ADMIN — FERROUS SULFATE TAB EC 325 MG (65 MG FE EQUIVALENT) 1 EACH: 325 (65 FE) TABLET DELAYED RESPONSE at 09:10

## 2023-10-25 RX ADMIN — AMIODARONE HYDROCHLORIDE 200 MG: 200 TABLET ORAL at 08:10

## 2023-10-25 RX ADMIN — FAMOTIDINE 20 MG: 20 TABLET ORAL at 08:10

## 2023-10-25 RX ADMIN — PREDNISONE 5 MG: 5 TABLET ORAL at 09:10

## 2023-10-25 RX ADMIN — FAMOTIDINE 20 MG: 20 TABLET ORAL at 09:10

## 2023-10-25 RX ADMIN — ISOSORBIDE MONONITRATE 30 MG: 30 TABLET, EXTENDED RELEASE ORAL at 08:10

## 2023-10-25 RX ADMIN — DOXAZOSIN 4 MG: 2 TABLET ORAL at 08:10

## 2023-10-25 RX ADMIN — METOPROLOL SUCCINATE 12.5 MG: 25 TABLET, EXTENDED RELEASE ORAL at 08:10

## 2023-10-25 RX ADMIN — HYDRALAZINE HYDROCHLORIDE 50 MG: 25 TABLET, FILM COATED ORAL at 07:10

## 2023-10-25 RX ADMIN — METOPROLOL SUCCINATE 12.5 MG: 25 TABLET, EXTENDED RELEASE ORAL at 09:10

## 2023-10-25 RX ADMIN — HYDRALAZINE HYDROCHLORIDE 50 MG: 50 TABLET ORAL at 09:10

## 2023-10-25 RX ADMIN — LABETALOL HYDROCHLORIDE 10 MG: 5 INJECTION, SOLUTION INTRAVENOUS at 11:10

## 2023-10-25 RX ADMIN — ACETAMINOPHEN 1000 MG: 500 TABLET ORAL at 08:10

## 2023-10-25 NOTE — ASSESSMENT & PLAN NOTE
- hx noted  - patient uses home inhalers PRN  - duo nebs PRN  - not on home O2  - keep O2 sats 88-92%

## 2023-10-25 NOTE — ASSESSMENT & PLAN NOTE
Patient presents with worsening SOB on exertion since discharge last week.  - AF, Hypertensive  - O2 100% on RA  - , Tn 0.039  - CXR showed worsening bibasilar pulmonary opacities which may be related to infectious/inflammatory process versus atelectasis or pulmonary edema in the appropriate clinical setting. Possible small bilateral pleural effusions  - will hold on diuresis until evaluated by consult services given complicate case  - Cardiology consulted for diuretic recs given kidney function  - walk test pending  Results for orders placed during the hospital encounter of 10/13/23    Echo    Interpretation Summary    Left Ventricle: The left ventricle is normal in size. Ventricular mass is normal. Normal wall thickness. Normal wall motion. There is normal systolic function with a visually estimated ejection fraction of 60 - 65%. Grade II diastolic dysfunction.    Left Atrium: Left atrium is severely dilated.    Right Ventricle: Normal right ventricular cavity size. Wall thickness is normal. Right ventricle wall motion  is normal. Systolic function is normal.    Right Atrium: Right atrium is dilated.    Aortic Valve: Moderately calcified cusps. There is moderate annular calcification present. Mildly restricted motion. There is mild stenosis. Aortic valve area by VTI is 1.85 cm². Aortic valve peak velocity is 2.12 m/s. Mean gradient is 9 mmHg. The dimensionless index is 0.49. There is mild to moderate aortic regurgitation.    Pulmonary Artery: The estimated pulmonary artery systolic pressure is 59 mmHg.    IVC/SVC: Normal venous pressure at 3 mmHg.

## 2023-10-25 NOTE — TELEPHONE ENCOUNTER
"----- Message from Tasia Conroy DO sent at 10/25/2023  2:21 PM CDT -----  I just saw him as well. He knows he is not a re-transplant candidate and is not expecting to be re-transplanted    Tasia   ----- Message -----  From: Nelson Webb MD  Sent: 10/25/2023   2:04 PM CDT  To: Merlene Colin MD; #    He was in the hospital with active ischemia, he was treated medically last week with a heparin drip. And he is also hospitalized now. He is not a candidate. Last time I spoke with Mr Phelps he was not interested on dialysis.  ----- Message -----  From: Abadie, Michelle, RN  Sent: 10/25/2023   1:14 PM CDT  To: Nelson Webb MD; #    Good afternoon,    We have received a referral on the above patient. He is 68 y/o (68 in November), BMI of 28, and Predialysis. His medical history includes: active smoker w/ COPD, CAD s/p CABG x1 in 2006 on Plavix, paroxysmal Afib s/p cardioversion 3/2022 (on Eliquis, Metoprolol, and Amiodarone), HFpEF (EF 65%; not currently taking diuretics), hiatal hernia with reflux, HTN on Hydralazine, HLD on Lipitor, s/p kidney transplants in 1992 and 2005. He presented to the ED on 10/13/23 complaining about worsening shortness of breath/DAUGHERTY; infrequent, nonproductive cough; and severe, sharp L posterior heel pain that started a few days ago. Per the patient, he woke up last night "gasping for air," short of breath, and with substernal chest pain radiating to his back. The chest pain resolved with Omeprazole; denies chest pain at this time. The shortness of breath and infrequent, nonproductive cough typically occur at rest and are worse with walking. The severe, sharp L posterior heel pain is worse with movement and walking, and has not responded to leg elevation. Of note, he has a history of tenosynovitis. He is currently in the ED now for shortness of breath. Of note, patient admitted last week for similar presentation. States he has been experiencing increased shortness of breath " "since discharge. Feels as though "he cannot get enough air in." Has associated stomach/ chest cramps when he feels short of breath. Symptoms are worse on exertion. States he took his lasix yesterday and has been taking it PRN due to his kidney function. Denies weighing himself at home. States he is complaint with his medications and does not miss any doses. BP at home has been running with systolic 180s-190s and sometimes will drop to systolic 130s-140s.      CTScan of Abdomen 10/13/23:  Vasculature: Scattered aortoiliac and branch vessel atherosclerotic calcifications.  The distal abdominal aorta fails to taper, but the overall abdominal diameter is within normal limits at only approximately 2.2 cm.    Echo 10/15/23   Left Ventricle: The left ventricle is normal in size. Ventricular mass is normal. Normal wall thickness. Normal wall motion. There is normal systolic function with a visually estimated ejection fraction of 60 - 65%. Grade II diastolic dysfunction.  ·  Left Atrium: Left atrium is severely dilated.  ·  Right Ventricle: Normal right ventricular cavity size. Wall thickness is normal. Right ventricle wall motion  is normal. Systolic function is normal.  ·  Right Atrium: Right atrium is dilated.  ·  Aortic Valve: Moderately calcified cusps. There is moderate annular calcification present. Mildly restricted motion. There is mild stenosis. Aortic valve area by VTI is 1.85 cm². Aortic valve peak velocity is 2.12 m/s. Mean gradient is 9 mmHg. The dimensionless index is 0.49. There is mild to moderate aortic regurgitation.  ·  Pulmonary Artery: The estimated pulmonary artery systolic pressure is 59 mmHg.  ·  IVC/SVC: Normal venous pressure at 3 mmHg.    Stress Test: 09/13/23    There is a small sized, mild intensity apical inferior and inferoseptal reversible perfusion abnormality involving 6% of LV myocardium indicative of focal coronary stenosis.  ·  Within the perfusion abnormality, absolute myocardial " perfusion (cc/min/gm) averaged 0.56 cc/min/g at rest, 0.71 cc/min/g at stress and CFR was 1.27 , which equates to severely reduced coronary flow capacity within the LAD territory.  ·  There are no other significant perfusion abnormalities.  ·  The whole heart absolute myocardial perfusion values averaged 0.74 cc/min/g at rest, which is normal; 1.17 cc/min/g at stress, which is mildly reduced; and CFR is 1.59 , which is mildly reduced.  ·  CT attenuation images demonstrate severe diffuse coronary calcifications in the LAD, and moderate diffuse coronary calcifications in the LCX and RCA territory and mild diffuse aortic calcifications in the descending aorta.  ·  The gated perfusion images showed an ejection fraction of 53% at rest and 56% during stress. A normal ejection fraction is greater than 47%.  ·  The wall motion is normal at rest and during stress.  ·  The LV cavity size is mildly enlarged at rest and stress.  ·  The ECG portion of the study is abnormal but not diagnostic due to resting ST-T abnormalities.  ·  There were no arrhythmias during stress.  ·  The patient reported chest pain during the stress test.  ·  There are no prior studies for comparison.     Is patient eligible for RR clinic?    Thank you,  Mónica

## 2023-10-25 NOTE — ASSESSMENT & PLAN NOTE
- hx noted  - continue home prednisone and prograf  - prograf level pending  - KTM consulted, appreciate assistance

## 2023-10-25 NOTE — ASSESSMENT & PLAN NOTE
Patient with Paroxysmal (<7 days) atrial fibrillation which is controlled currently with Beta Blocker and Amiodarone. Patient is currently in sinus rhythm.[id number] Score: 2. Anticoagulation indicated. Anticoagulation done with Eliquis.  - continue tele

## 2023-10-25 NOTE — CARE UPDATE
Cardiology Plan of Care Note:     Notified by primary team of Mr Phelps's re-presentation to the hospital with shortness of breath. He was evidence of volume overload with elevated BNP and congested appearing CXR. His renal function is slightly worse from baseline, but still believe he would benefit from diuresis. Would start with lasix 120 mg IV 2-3 times daily. If suboptimal response would give lasix 200 mg IV x1 followed by continuous infusion at 20 mg/hr. If suboptimal response please feel free to reach back out to cardiology.     Heraclio Melgar MD  [facility]  Cardiovascular Disease, PGY-VI

## 2023-10-25 NOTE — ED NOTES
Patient identifiers verified and correct for Ibrahima Phelps  LOC: The patient is awake, alert and aware of environment with an appropriate affect, the patient is oriented x 3 and speaking appropriately.   APPEARANCE: Patient appears comfortable and in no acute distress, patient is clean and well groomed.  SKIN: The skin is warm and dry, color consistent with ethnicity, patient has normal skin turgor and moist mucus membranes, skin intact, no breakdown or bruising noted.   MUSCULOSKELETAL: Patient moving all extremities spontaneously, no swelling noted.  RESPIRATORY: Airway is open and patent, respirations are spontaneous, patient has a normal effort and rate, no accessory muscle use noted, pt placed on continuous pulse ox with O2 sats noted at 98% on room air.  CARDIAC: Pt placed on cardiac monitor. Patient has a normal rate and regular rhythm, no edema noted, capillary refill < 3 seconds.   GASTRO: Soft and non tender to palpation, no distention noted, normoactive bowel sounds present in all four quadrants. Pt states bowel movements have been regular.  : Pt denies any pain or frequency with urination.  NEURO: Pt opens eyes spontaneously, behavior appropriate to situation, follows commands, facial expression symmetrical, bilateral hand grasp equal and even, purposeful motor response noted, normal sensation in all extremities when touched with a finger.

## 2023-10-25 NOTE — PLAN OF CARE
[facility] - Emergency Dept  Initial Discharge Assessment       Primary Care Provider: Anatoliy Colindres MD    Admission Diagnosis: Acute pulmonary edema with congestive heart failure [I50.1]    Admission Date: 10/25/2023  Expected Discharge Date:     Pt stated he is independent with ADL's and ambulation and does not require equipment or assistance.    Pt to d/c home with no needs when ready    Transition of Care Barriers: (P) None    Payor: [employer] MANAGED MEDICARE / Plan: [employer] MEDICARE HMO / Product Type: Capitation /     Extended Emergency Contact Information  Primary Emergency Contact: PhelpsShea  Address: [address] [place] [address] [facility]  Home Phone: [phone]  Mobile Phone: [phone]  Relation: Spouse  Preferred language: English  Secondary Emergency Contact: Mary Becerra  Address: [address] [place] [address] [facility]  Home Phone: [phone]  Mobile Phone: [phone]  Relation: Daughter    Discharge Plan A: (P) Home  Discharge Plan B: (P) Home      [facility] - [place], [place] - [address]  [address] [address]  Phone: [phone] Fax: [phone]    [id number] [place] [place] [facility] [address]  [address]  [facility] [place] [address]  Phone: [phone] Fax: [phone]      Initial Assessment (most recent)       Adult Discharge Assessment - 10/25/23 0808          Discharge Assessment    Assessment Type Discharge Planning Assessment (P)      Confirmed/corrected address, phone number and insurance Yes (P)      Confirmed Demographics Correct on Facesheet (P)      Source of Information patient (P)      People in Home spouse (P)      Facility Arrived From: home (P)      Do you expect to return to your current living situation? Yes (P)      Do you have help at home or someone to help you manage your care at home? No (P)      Prior to  hospitilization cognitive status: Alert/Oriented;No Deficits (P)      Current cognitive status: Alert/Oriented;No Deficits (P)      Home Accessibility wheelchair accessible (P)      Home Layout Able to live on 1st floor (P)      Equipment Currently Used at Home none (P)      Patient currently being followed by outpatient case management? No (P)      Do you currently have service(s) that help you manage your care at home? No (P)      Do you take prescription medications? Yes (P)      Do you have any problems affording any of your prescribed medications? No (P)      Is the patient taking medications as prescribed? yes (P)      Who is going to help you get home at discharge? family (P)      How do you get to doctors appointments? car, drives self (P)      Are you on dialysis? No (P)      Do you take coumadin? No (P)      DME Needed Upon Discharge  none (P)      Discharge Plan discussed with: Patient (P)      Transition of Care Barriers None (P)      Discharge Plan A Home (P)      Discharge Plan B Home (P)         Physical Activity    On average, how many days per week do you engage in moderate to strenuous exercise (like a brisk walk)? 0 days (P)      On average, how many minutes do you engage in exercise at this level? 0 min (P)         Financial Resource Strain    How hard is it for you to pay for the very basics like food, housing, medical care, and heating? Not very hard (P)         Housing Stability    In the last 12 months, was there a time when you were not able to pay the mortgage or rent on time? No (P)      In the last 12 months, was there a time when you did not have a steady place to sleep or slept in a shelter (including now)? No (P)         Transportation Needs    In the past 12 months, has lack of transportation kept you from medical appointments or from getting medications? No (P)      In the past 12 months, has lack of transportation kept you from meetings, work, or from getting things needed for daily  living? No (P)         Food Insecurity    Within the past 12 months, you worried that your food would run out before you got the money to buy more. Never true (P)      Within the past 12 months, the food you bought just didn't last and you didn't have money to get more. Never true (P)         Stress    Do you feel stress - tense, restless, nervous, or anxious, or unable to sleep at night because your mind is troubled all the time - these days? Only a little (P)         Social Connections    In a typical week, how many times do you talk on the phone with family, friends, or neighbors? More than three times a week (P)      How often do you get together with friends or relatives? More than three times a week (P)      How often do you attend Mormonism or Druze services? More than 4 times per year (P)      Do you belong to any clubs or organizations such as Mormonism groups, unions, fraternal or athletic groups, or school groups? No (P)      How often do you attend meetings of the clubs or organizations you belong to? Never (P)      Are you , , , , never , or living with a partner?  (P)         Alcohol Use    Q1: How often do you have a drink containing alcohol? Never (P)      Q2: How many drinks containing alcohol do you have on a typical day when you are drinking? Patient does not drink (P)      Q3: How often do you have six or more drinks on one occasion? Never (P)         OTHER    Name(s) of People in Home spouse Shea (P)                       Denia Crowder CD, MSW, LMSW, RSW   Case Management  [facility]  Email: [email or web address]

## 2023-10-25 NOTE — HPI
"Ibrahima Phelps is a 67 y.o. male with a hx of CHF, HTN, Afib, CKD, and kidney transplant presents to the ED for shortness of breath. Of note, patient admitted last week for similar presentation. States he has been experiencing increased shortness of breath since discharge. Feels as though "he cannot get enough air in." Has associated stomach/ chest cramps when he feels short of breath. Symptoms are worse on exertion. States he took his lasix yesterday and has been taking it PRN due to his kidney function. Denies weighing himself at home. States he is complaint with his medications and does not miss any doses. BP at home has been running with systolic 180s-190s and sometimes will drop to systolic 130s-140s. Denies home O2 use. Denies fever, chills, cough, congestion, chest pain, abdominal pain, constipation, diarrhea, lower extremity swelling and urinary symptoms. States he was having very little urine output last admission but is unsure if it is back to baseline.     ED: AF, Hypertensive. Hgb 9.3. Cr 5.4. Tn 0.039. . EKG showed sinus rhythm with PACs. CXR showed worsening bibasilar pulmonary opacities which may be related to infectious/inflammatory process versus atelectasis or pulmonary edema in the appropriate clinical setting. Possible small bilateral pleural effusions. Given amio, Eliquis, plavix, doxazosin, hydralazine, and imdur in the ED.      "

## 2023-10-25 NOTE — ASSESSMENT & PLAN NOTE
Patient with known CAD, which is controlled   - Will continue Plavix and Statin and monitor for S/Sx of angina/ACS.   - Continue to monitor on telemetry  - pt is currently chest pain free  - Tn 0.039, will continue to trend  - EKG w/o acute ischemic changes  - Cardiology consulted, appreciate recs  - STAT EKG and trop if chest pain occurs

## 2023-10-25 NOTE — SUBJECTIVE & OBJECTIVE
"Past Medical History:   Diagnosis Date    Atrial fibrillation     CAD (coronary artery disease) 2006    MI in 2006    CHF (congestive heart failure) 2017    CKD (chronic kidney disease) stage 3, GFR 30-59 ml/min     Dr. Latif.  in transplanted kidney    COVID-19 2/7/2023    Diverticulosis     Hyperlipidemia     Hypertension     Keloid cicatrix     Metabolic bone disease     Other emphysema 10/1/2019    Pericarditis     S/P kidney transplant 1992    x2 (1992 & 2005),    Thrombocytopenia     Thyroid disease     Tobacco use 09/05/2014       Past Surgical History:   Procedure Laterality Date    CARDIOVERSION  04/30/15    CHOLECYSTECTOMY      COLONOSCOPY  April 20, 2011    Diverticulosis, repeat recommended in 3 yrs., repeat colonoscopy 2014 revealed 2 polyps.  He should return in 5 years.    COLONOSCOPY N/A 3/13/2020    Procedure: COLONOSCOPY;  Surgeon: Chon Casper MD;  Location: [facility] (4TH FLR);  Service: Endoscopy;  Laterality: N/A;  ok to hold coumadin x5days-see telephone encounter 2/4/20-tb    COLONOSCOPY N/A 2/4/2021    Procedure: COLONOSCOPY;  Surgeon: Chon Casper MD;  Location: [facility] (4TH FLR);  Service: Endoscopy;  Laterality: N/A;  Eliquis - per Dr. GAVIN Blunt ok to hold x 2 days and "restarted asap"- ERW  last prep "poor", [id number] ordered/ Pt requests Dr. Casper  prep ins. mailed - COVID screening on 2/1/21 PCW- ERW    COLONOSCOPY N/A 3/3/2021    Procedure: COLONOSCOPY;  Surgeon: Chon Casper MD;  Location: [facility] (4TH FLR);  Service: Endoscopy;  Laterality: N/A;  Patient with his second poor bowel pre and has poor prep today.  If patient not intersted or can't get colonoscopy tomorrow will need constipation bowel prep and will need to restart his Eliquis today.Thanks,Chon     per Dr Blunt-ok to hold Eliquis 2 days prior      COVID     CORONARY ANGIOPLASTY WITH STENT PLACEMENT  9/13/2006    IRRIGATION AND DEBRIDEMENT Right 8/30/2023    Procedure: IRRIGATION AND DEBRIDEMENT, " RIGHT MIDDLE FINGER;  Surgeon: Martin Larsen MD;  Location: [facility] OR [facility] FLR;  Service: Orthopedics;  Laterality: Right;    KIDNEY TRANSPLANT  2005    PARATHYROIDECTOMY      TREATMENT OF CARDIAC ARRHYTHMIA N/A 3/28/2022    Procedure: CARDIOVERSION;  Surgeon: Migue Blunt MD;  Location: [facility] EP LAB;  Service: Cardiology;  Laterality: N/A;  AF, DCC, MAC, GP, 3 PREP*RODRIGO deferred pt 100% compliant*       Review of patient's allergies indicates:   Allergen Reactions    Ace inhibitors Swelling    Verapamil Other (See Comments)     Other reaction(s): Unknown    Povidone-iodine Itching       No current facility-administered medications on file prior to encounter.     Current Outpatient Medications on File Prior to Encounter   Medication Sig    acetaminophen (TYLENOL) 500 MG tablet Take 1 tablet (500 mg total) by mouth every 6 (six) hours as needed for Pain.    albuterol (PROVENTIL) 2.5 mg /3 mL (0.083 %) nebulizer solution Take 3 mLs (2.5 mg total) by nebulization every 6 (six) hours as needed for Wheezing. Rescue    albuterol (VENTOLIN HFA) 90 mcg/actuation inhaler Inhale 2 puffs into the lungs every 6 (six) hours as needed for Wheezing or Shortness of Breath. Rescue    amiodarone (PACERONE) 200 MG Tab Take 1 tablet (200 mg total) by mouth once daily.    ammonium lactate 12 % Crea Apply 1 g topically once daily.    apixaban (ELIQUIS) 5 mg Tab Take 1 tablet (5 mg total) by mouth 2 (two) times daily.    atorvastatin (LIPITOR) 40 MG tablet Take 1 tablet (40 mg total) by mouth once daily.    b complex vitamins (B COMPLEX-VITAMIN B12) tablet Take 1 tablet by mouth once daily.    calcium carbonate (CALCIUM 600 ORAL) Take 1 tablet by mouth 2 (two) times a day.    clopidogreL (PLAVIX) 75 mg tablet Take 1 tablet (75 mg total) by mouth once daily.    doxazosin (CARDURA) 4 MG tablet Take 1 tablet (4 mg total) by mouth every 12 (twelve) hours.    famotidine (PEPCID) 20 MG tablet Take 1 tablet (20 mg total) by mouth 2  (two) times daily.    ferrous sulfate 325 (65 FE) MG EC tablet Take 1 tablet (325 mg total) by mouth once daily.    fexofenadine HCl (ALLEGRA ORAL) Take 1 tablet by mouth daily as needed (allergies).    fluticasone propion-salmeterol 115-21 mcg/dose (ADVAIR HFA) 115-21 mcg/actuation HFAA inhaler Inhale 2 puffs into the lungs every 12 (twelve) hours. Controller    fluticasone propionate (FLONASE) 50 mcg/actuation nasal spray 1 spray (50 mcg total) by Each Nostril route daily as needed for Allergies.    furosemide (LASIX) 40 MG tablet Take 0.5 tablets (20 mg total) by mouth 2 (two) times daily. HOLD this medication on discharge per nephrology recs, and resume only once needed for lower extremity edema. (Patient taking differently: Take 20 mg by mouth every other day.)    gabapentin (NEURONTIN) 100 MG capsule Take 1 capsule (100 mg total) by mouth as needed (pain).    hydrALAZINE (APRESOLINE) 50 MG tablet TAKE 1 TABLET (50 MG TOTAL) BY MOUTH 3 (THREE) TIMES DAILY.    isosorbide mononitrate (IMDUR) 30 MG 24 hr tablet Take 2 tablets (60 mg total) by mouth once daily.    metoprolol succinate (TOPROL-XL) 25 MG 24 hr tablet Take 1 tablet (25 mg total) by mouth once daily.    MITIGARE 0.6 mg Cap TAKE 1 CAPSULE BY MOUTH TWICE A DAY AS NEEDED GOUT FLARE UP TO 3 DAYS    multivitamin (THERAGRAN) per tablet Take 1 tablet by mouth Daily.    NIFEdipine (PROCARDIA-XL) 60 MG (OSM) 24 hr tablet Take 1 tablet (60 mg total) by mouth 2 (two) times a day. (Patient not taking: Reported on 10/23/2023)    nitroGLYCERIN (NITROSTAT) 0.4 MG SL tablet Place 1 tablet (0.4 mg total) under the tongue every 5 (five) minutes as needed for Chest pain. (Patient not taking: Reported on 10/23/2023)    omeprazole (PRILOSEC) 20 MG capsule Take 1 capsule (20 mg total) by mouth daily as needed (heartburn).    paricalcitoL (ZEMPLAR) 1 MCG capsule TAKE 1 CAPSULE ON MONDAY, WEDNESDAY AND FRIDAY    predniSONE (DELTASONE) 5 MG tablet TAKE 1 TABLET BY MOUTH EVERY  DAY IN THE MORNING    pulse oximeter (PULSE OXIMETER) device by Apply Externally route 2 (two) times a day. Use twice daily at 8 AM and 3 PM and record the value in MyChart as directed.    tacrolimus (PROGRAF) 1 MG Cap Take 3 capsules (3 mg total) by mouth every 12 (twelve) hours.    vitamin D 1000 units Tab Take 370 mg by mouth 2 (two) times daily. 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     Patient is currently retired   Substance and Sexual Activity    Alcohol use: No    Drug use: No    Sexual activity: Yes     Partners: Female     Review of Systems   Constitutional:  Positive for activity change. Negative for chills and fever.   Respiratory:  Positive for shortness of breath. Negative for chest tightness.    Cardiovascular:  Positive for chest pain (pleuritic, resolved). Negative for leg swelling.   Gastrointestinal:  Negative for abdominal pain, constipation, diarrhea, nausea and vomiting.   Genitourinary:  Negative for dysuria and frequency.   Neurological:  Negative for dizziness and weakness.     Objective:     Vital Signs (Most Recent):  Temp: 97.8 °F (36.6 °C) (10/25/23 0501)  Pulse: 66 (10/25/23 0759)  Resp: 20 (10/25/23 0759)  BP: (!) 198/91 (10/25/23 0703)  SpO2: 97 % (10/25/23 0759) Vital Signs (24h Range):  Temp:  [97.8 °F (36.6 °C)] 97.8 °F (36.6 °C)  Pulse:  [64-90] 66  Resp:  [16-20] 20  SpO2:  [97 %-100 %] 97 %  BP:  (127-198)/(76-91) 198/91     Weight: 93 kg (205 lb)  Body mass index is 27.05 kg/m².     Physical Exam  Vitals and nursing note reviewed.   Constitutional:       Appearance: He is well-developed.      Comments: Pleasant on exam, speaking in full sentences. On RA   Eyes:      Pupils: Pupils are equal, round, and reactive to light.   Cardiovascular:      Rate and Rhythm: Normal rate and regular rhythm.   Pulmonary:      Effort: Pulmonary effort is normal.      Breath sounds: Normal breath sounds.   Abdominal:      Palpations: Abdomen is soft.      Tenderness: There is no abdominal tenderness.   Musculoskeletal:         General: No tenderness.      Right lower leg: No edema.      Left lower leg: No edema.   Skin:     General: Skin is warm and dry.   Neurological:      Mental Status: He is alert and oriented to person, place, and time.   Psychiatric:         Behavior: Behavior normal.              CRANIAL NERVES     CN III, IV, VI   Pupils are equal, round, and reactive to light.       Significant Labs: All pertinent labs within the past 24 hours have been reviewed.  BMP:   Recent Labs   Lab 10/25/23  0523   GLU 94      K 4.5      CO2 17*   BUN 47*   CREATININE 5.4*   CALCIUM 7.8*     CBC:   Recent Labs   Lab 10/25/23  0523   WBC 9.63   HGB 9.3*   HCT 28.8*          Significant Imaging: I have reviewed all pertinent imaging results/findings within the past 24 hours.    Imaging Results              X-Ray Chest AP Portable (Final result)  Result time 10/25/23 06:06:05      Final result by Sd Altamirano MD (10/25/23 06:06:05)                   Impression:      Worsening bibasilar pulmonary opacities which may be related to infectious/inflammatory process versus atelectasis or pulmonary edema in the appropriate clinical setting.    Possible small bilateral pleural effusions.      Electronically signed by: Sd Altamirano MD  Date:    10/25/2023  Time:    06:06               Narrative:     "EXAMINATION:  XR CHEST AP PORTABLE    CLINICAL HISTORY:  Provided history is "Chest Pain;  ".    TECHNIQUE:  One view of the chest.    COMPARISON:  10/13/2023.    FINDINGS:  Cardiac wires overlie the chest.  Cardiomediastinal silhouette is stable.  Atherosclerotic calcifications overlie the aortic arch.  Lungs are mildly hyperinflated suggestive of pulmonary emphysema.  There are bibasilar interstitial and airspace opacities, with mildly worse aeration when compared with 10/13/2023.  Wedge like region of opacification in the right lung base which may be related to atelectasis or consolidation.  Possible small bilateral pleural effusions.  No distinct pneumothorax.  Calcified granulomata noted in the left upper lung with calcified mediastinal lymph nodes.                                      "

## 2023-10-25 NOTE — TELEPHONE ENCOUNTER
"----- Message from Nelson Webb MD sent at 10/25/2023  2:02 PM CDT -----  He was in the hospital with active ischemia, he was treated medically last week with a heparin drip. And he is also hospitalized now. He is not a candidate. Last time I spoke with Mr Phelps he was not interested on dialysis.  ----- Message -----  From: Abadie, Michelle, RN  Sent: 10/25/2023   1:14 PM CDT  To: Nelson Webb MD; #    Good afternoon,    We have received a referral on the above patient. He is 68 y/o (68 in November), BMI of 28, and Predialysis. His medical history includes: active smoker w/ COPD, CAD s/p CABG x1 in 2006 on Plavix, paroxysmal Afib s/p cardioversion 3/2022 (on Eliquis, Metoprolol, and Amiodarone), HFpEF (EF 65%; not currently taking diuretics), hiatal hernia with reflux, HTN on Hydralazine, HLD on Lipitor, s/p kidney transplants in 1992 and 2005. He presented to the ED on 10/13/23 complaining about worsening shortness of breath/DAUGHERTY; infrequent, nonproductive cough; and severe, sharp L posterior heel pain that started a few days ago. Per the patient, he woke up last night "gasping for air," short of breath, and with substernal chest pain radiating to his back. The chest pain resolved with Omeprazole; denies chest pain at this time. The shortness of breath and infrequent, nonproductive cough typically occur at rest and are worse with walking. The severe, sharp L posterior heel pain is worse with movement and walking, and has not responded to leg elevation. Of note, he has a history of tenosynovitis. He is currently in the ED now for shortness of breath. Of note, patient admitted last week for similar presentation. States he has been experiencing increased shortness of breath since discharge. Feels as though "he cannot get enough air in." Has associated stomach/ chest cramps when he feels short of breath. Symptoms are worse on exertion. States he took his lasix yesterday and has been taking it PRN due to his " kidney function. Denies weighing himself at home. States he is complaint with his medications and does not miss any doses. BP at home has been running with systolic 180s-190s and sometimes will drop to systolic 130s-140s.      CTScan of Abdomen 10/13/23:  Vasculature: Scattered aortoiliac and branch vessel atherosclerotic calcifications.  The distal abdominal aorta fails to taper, but the overall abdominal diameter is within normal limits at only approximately 2.2 cm.    Echo 10/15/23   Left Ventricle: The left ventricle is normal in size. Ventricular mass is normal. Normal wall thickness. Normal wall motion. There is normal systolic function with a visually estimated ejection fraction of 60 - 65%. Grade II diastolic dysfunction.  ·  Left Atrium: Left atrium is severely dilated.  ·  Right Ventricle: Normal right ventricular cavity size. Wall thickness is normal. Right ventricle wall motion  is normal. Systolic function is normal.  ·  Right Atrium: Right atrium is dilated.  ·  Aortic Valve: Moderately calcified cusps. There is moderate annular calcification present. Mildly restricted motion. There is mild stenosis. Aortic valve area by VTI is 1.85 cm². Aortic valve peak velocity is 2.12 m/s. Mean gradient is 9 mmHg. The dimensionless index is 0.49. There is mild to moderate aortic regurgitation.  ·  Pulmonary Artery: The estimated pulmonary artery systolic pressure is 59 mmHg.  ·  IVC/SVC: Normal venous pressure at 3 mmHg.    Stress Test: 09/13/23    There is a small sized, mild intensity apical inferior and inferoseptal reversible perfusion abnormality involving 6% of LV myocardium indicative of focal coronary stenosis.  ·  Within the perfusion abnormality, absolute myocardial perfusion (cc/min/gm) averaged 0.56 cc/min/g at rest, 0.71 cc/min/g at stress and CFR was 1.27 , which equates to severely reduced coronary flow capacity within the LAD territory.  ·  There are no other significant perfusion abnormalities.  ·   The whole heart absolute myocardial perfusion values averaged 0.74 cc/min/g at rest, which is normal; 1.17 cc/min/g at stress, which is mildly reduced; and CFR is 1.59 , which is mildly reduced.  ·  CT attenuation images demonstrate severe diffuse coronary calcifications in the LAD, and moderate diffuse coronary calcifications in the LCX and RCA territory and mild diffuse aortic calcifications in the descending aorta.  ·  The gated perfusion images showed an ejection fraction of 53% at rest and 56% during stress. A normal ejection fraction is greater than 47%.  ·  The wall motion is normal at rest and during stress.  ·  The LV cavity size is mildly enlarged at rest and stress.  ·  The ECG portion of the study is abnormal but not diagnostic due to resting ST-T abnormalities.  ·  There were no arrhythmias during stress.  ·  The patient reported chest pain during the stress test.  ·  There are no prior studies for comparison.     Is patient eligible for RR clinic?    Thank you,  Mónica

## 2023-10-25 NOTE — CONSULTS
U Pulmonary [facility] Critical Care Medicine Note    Primary Attending Physician: Dr. Godoy  ICU Attending: Dr. Bishop    Reason for Consult:     Hypertensive urgency    Subjective:      History of Present Illness:  Ibrahima Phelps is a 67 y.o. male with h/o CAD s/p stent (2006), renal transplant x2 (last 2005) with resultant CKD3 (baseline Cr a couple months ago 2.5-2.9, on tacro), HTN, HLD, tachycardia-bradycardia syndrome (pAfib), HFpEF, hiatal hernia, reflux, and COPD who presented to [facility] ED on 10/25/2023 with a primary complaint of Chest Pain (Epigastric/lower chest pain, increased work of breathing noted. Took 1 nitro tab at home with relief. Hx of kidney transplant. )    Per the patient, he started having squeezing chest pain during his last hospitalization that is relieved with nitroglycerin. Deferred to medical management during that time. He also had elevated Cr during admission (prior baseline 2.5-2.9) but that increased then stabilized at 4.6-4.8 prior to discharge. Since discharge, the wife says he has been urinating more than he was prior to the recent hospital stay. He has had a few episodes of chest pain since that resolve with nitro. The pain is squeezing type pain. He also has tenderness to his right chest which is tender to touch but that is not the same as the squeezing pain that responds to nitro. It also feels like he can't breath or get air in when this happens. Doesn't notice aggravating factors. Transplant nephro consulted. Pt on home tacrolimus and prednisone.     Past Medical History:  Past Medical History:   Diagnosis Date    Atrial fibrillation     CAD (coronary artery disease) 2006    MI in 2006    CHF (congestive heart failure) 2017    CKD (chronic kidney disease) stage 3, GFR 30-59 ml/min     Dr. Latif.  in transplanted kidney    COVID-19 2/7/2023    Diverticulosis     Hyperlipidemia     Hypertension     Keloid cicatrix     Metabolic bone disease     Other emphysema 10/1/2019     "Pericarditis     S/P kidney transplant 1992    x2 (1992 & 2005),    Thrombocytopenia     Thyroid disease     Tobacco use 09/05/2014       Past Surgical History:  Past Surgical History:   Procedure Laterality Date    CARDIOVERSION  04/30/15    CHOLECYSTECTOMY      COLONOSCOPY  April 20, 2011    Diverticulosis, repeat recommended in 3 yrs., repeat colonoscopy 2014 revealed 2 polyps.  He should return in 5 years.    COLONOSCOPY N/A 3/13/2020    Procedure: COLONOSCOPY;  Surgeon: Chon Casper MD;  Location: [facility] (4TH FLR);  Service: Endoscopy;  Laterality: N/A;  ok to hold coumadin x5days-see telephone encounter 2/4/20-tb    COLONOSCOPY N/A 2/4/2021    Procedure: COLONOSCOPY;  Surgeon: Chon Casper MD;  Location: [facility] (4TH FLR);  Service: Endoscopy;  Laterality: N/A;  Eliquis - per Dr. GAVIN Blunt ok to hold x 2 days and "restarted asap"- ERW  last prep "poor", [id number] ordered/ Pt requests Dr. Casper  prep ins. mailed - COVID screening on 2/1/21 PCW- ERW    COLONOSCOPY N/A 3/3/2021    Procedure: COLONOSCOPY;  Surgeon: Chon Casper MD;  Location: [facility] (4TH FLR);  Service: Endoscopy;  Laterality: N/A;  Patient with his second poor bowel pre and has poor prep today.  If patient not intersted or can't get colonoscopy tomorrow will need constipation bowel prep and will need to restart his Eliquis today.Thanks,Chon     per Dr Blunt-ok to hold Eliquis 2 days prior      COVID     CORONARY ANGIOPLASTY WITH STENT PLACEMENT  9/13/2006    IRRIGATION AND DEBRIDEMENT Right 8/30/2023    Procedure: IRRIGATION AND DEBRIDEMENT, RIGHT MIDDLE FINGER;  Surgeon: Martin Larsen MD;  Location: [facility] OR [facility] FLR;  Service: Orthopedics;  Laterality: Right;    KIDNEY TRANSPLANT  2005    PARATHYROIDECTOMY      TREATMENT OF CARDIAC ARRHYTHMIA N/A 3/28/2022    Procedure: CARDIOVERSION;  Surgeon: Migue Blunt MD;  Location: [facility] EP LAB;  Service: Cardiology;  Laterality: N/A;  AF, DCC, MAC, GP, 3 PREP*RODRIGO " deferred pt 100% compliant*       Allergies:  Review of patient's allergies indicates:   Allergen Reactions    Ace inhibitors Swelling    Verapamil Other (See Comments)     Other reaction(s): Unknown    Povidone-iodine Itching       Medications:   In-Hospital Scheduled Medications:   amiodarone  200 mg Oral Daily    apixaban  5 mg Oral BID    atorvastatin  40 mg Oral Daily    clopidogreL  75 mg Oral Daily    doxazosin  4 mg Oral Q12H    famotidine  20 mg Oral BID    ferrous sulfate  1 tablet Oral Daily    [START ON 10/26/2023] isosorbide mononitrate  60 mg Oral Daily    metoprolol succinate  12.5 mg Oral BID    paricalcitoL  1 mcg Oral Every Mon, Wed, Fri    predniSONE  5 mg Oral Daily    tacrolimus  3 mg Oral BID      In-Hospital PRN Medications:  bisacodyL, dextrose 10%, dextrose 10%, glucagon (human recombinant), glucose, glucose, hydrALAZINE, labetalol, melatonin, polyethylene glycol, promethazine, sodium chloride 0.9%   In-Hospital IV Infusion Medications:   furosemide (LASIX) 2 mg/mL continuous infusion (non-titrating) 20 mg/hr (10/25/23 1553)      Home Medications:  Prior to Admission medications    Medication Sig Start Date End Date Taking? Authorizing Provider   acetaminophen (TYLENOL) 500 MG tablet Take 1 tablet (500 mg total) by mouth every 6 (six) hours as needed for Pain. 12/25/22  Yes Nato Portillo MD   albuterol (PROVENTIL) 2.5 mg /3 mL (0.083 %) nebulizer solution Take 3 mLs (2.5 mg total) by nebulization every 6 (six) hours as needed for Wheezing. Rescue 1/30/23 1/30/24 Yes Connie Magdaleno MD   albuterol (VENTOLIN HFA) 90 mcg/actuation inhaler Inhale 2 puffs into the lungs every 6 (six) hours as needed for Wheezing or Shortness of Breath. Rescue 4/18/22  Yes Connie Magdaleno MD   amiodarone (PACERONE) 200 MG Tab Take 1 tablet (200 mg total) by mouth once daily. 9/20/23 9/19/24 Yes BE Parmar MD   ammonium lactate 12 % Crea Apply 1 g topically once daily.  Patient taking differently: Apply 1 g  topically daily as needed (dry skin). 2/1/22  Yes Denia Kauffman DPM   apixaban (ELIQUIS) 5 mg Tab Take 1 tablet (5 mg total) by mouth 2 (two) times daily. 7/31/23  Yes Hussain Vail MD PhD   b complex vitamins (B COMPLEX-VITAMIN B12) tablet Take 1 tablet by mouth once daily. 6/29/23  Yes Marcos Brunson MD   calcium carbonate (CALCIUM 600 ORAL) Take 1 tablet by mouth 2 (two) times a day.   Yes Provider, Historical   CHOLECALCIFEROL, VITAMIN D3, ORAL Take 2 tablets by mouth 2 (two) times daily.   Yes Provider, Historical   clopidogreL (PLAVIX) 75 mg tablet Take 1 tablet (75 mg total) by mouth once daily. 10/21/23 10/20/24 Yes Randal Beaver MD   doxazosin (CARDURA) 4 MG tablet Take 1 tablet (4 mg total) by mouth every 12 (twelve) hours. 7/31/23  Yes Anatoliy Colindres MD   famotidine (PEPCID) 20 MG tablet Take 1 tablet (20 mg total) by mouth 2 (two) times daily. 8/1/23  Yes Emre Latif MD   ferrous sulfate 325 (65 FE) MG EC tablet Take 1 tablet (325 mg total) by mouth once daily. 10/20/23 11/19/23 Yes Randal Beaver MD   fexofenadine HCl (ALLEGRA ORAL) Take 1 tablet by mouth daily as needed (allergies).   Yes Provider, Historical   fluticasone propion-salmeterol 115-21 mcg/dose (ADVAIR HFA) 115-21 mcg/actuation HFAA inhaler Inhale 2 puffs into the lungs every 12 (twelve) hours. Controller  Patient taking differently: Inhale 2 puffs into the lungs 2 (two) times daily as needed (shortness of breath). Controller 9/30/22 10/25/23 Yes Sean Hdez MD   fluticasone propionate (FLONASE) 50 mcg/actuation nasal spray 1 spray (50 mcg total) by Each Nostril route daily as needed for Allergies. 10/20/23  Yes Randal Beaver MD   furosemide (LASIX) 40 MG tablet Take 0.5 tablets (20 mg total) by mouth 2 (two) times daily. HOLD this medication on discharge per nephrology recs, and resume only once needed for lower extremity edema.  Patient taking differently: Take 20 mg by mouth every other day. 9/1/23   Yes Sarah Braga MD   gabapentin (NEURONTIN) 100 MG capsule Take 1 capsule (100 mg total) by mouth as needed (pain). 10/20/23 10/19/24 Yes Randal Beaver MD   hydrALAZINE (APRESOLINE) 50 MG tablet TAKE 1 TABLET (50 MG TOTAL) BY MOUTH 3 (THREE) TIMES DAILY. 10/24/22  Yes Emre Latif MD   isosorbide mononitrate (IMDUR) 30 MG 24 hr tablet Take 2 tablets (60 mg total) by mouth once daily. 10/21/23 10/20/24 Yes Randal Beaver MD   metoprolol succinate (TOPROL-XL) 25 MG 24 hr tablet Take 1 tablet (25 mg total) by mouth once daily.  Patient taking differently: Take 12.5 mg by mouth 2 (two) times daily. 8/21/23 8/20/24 Yes Anatoliy Colindres MD   MITIGARE 0.6 mg Cap TAKE 1 CAPSULE BY MOUTH TWICE A DAY AS NEEDED GOUT FLARE UP TO 3 DAYS 7/11/23  Yes Emre Latif MD   multivitamin (THERAGRAN) per tablet Take 1 tablet by mouth Daily. 6/11/12  Yes Provider, Rob   nitroGLYCERIN (NITROSTAT) 0.4 MG SL tablet Place 1 tablet (0.4 mg total) under the tongue every 5 (five) minutes as needed for Chest pain. 10/20/23 10/19/24 Yes Randal Beaver MD   omeprazole (PRILOSEC) 20 MG capsule Take 1 capsule (20 mg total) by mouth daily as needed (heartburn). 10/20/23 10/19/24 Yes Randal Beaver MD   paricalcitoL (ZEMPLAR) 1 MCG capsule TAKE 1 CAPSULE ON MONDAY, WEDNESDAY AND FRIDAY 8/1/23  Yes Emre Latif MD   predniSONE (DELTASONE) 5 MG tablet TAKE 1 TABLET BY MOUTH EVERY DAY IN THE MORNING 3/8/23  Yes Emre Latif MD   tacrolimus (PROGRAF) 1 MG Cap Take 3 capsules (3 mg total) by mouth every 12 (twelve) hours. 10/20/23 10/19/24 Yes Randal Beaver MD   atorvastatin (LIPITOR) 40 MG tablet Take 1 tablet (40 mg total) by mouth once daily. 10/21/23 10/20/24  Randal Beaver MD   NIFEdipine (PROCARDIA-XL) 60 MG (OSM) 24 hr tablet Take 1 tablet (60 mg total) by mouth 2 (two) times a day.  Patient not taking: Reported on 10/23/2023 7/31/23 7/30/24  Anatoliy Colindres MD   pulse oximeter (PULSE  OXIMETER) device by Apply Externally route 2 (two) times a day. Use twice daily at 8 AM and 3 PM and record the value in MyChart as directed. 2/10/23   Kt Luna III, MD       Family History:  Family History   Problem Relation Age of Onset    No Known Problems Sister     No Known Problems Brother     Thyroid disease Mother         s/p surgery    Heart disease Father         had pacemaker    No Known Problems Sister     Kidney failure Sister     Kidney disease Sister     No Known Problems Sister     Kidney disease Brother     Kidney failure Brother         s/p transplant    Diabetes Mellitus Neg Hx     Stroke Neg Hx     Heart attack Neg Hx     Cancer Neg Hx     Celiac disease Neg Hx     Cirrhosis Neg Hx     Colon cancer Neg Hx     Esophageal cancer Neg Hx     Inflammatory bowel disease Neg Hx     Rectal cancer Neg Hx     Stomach cancer Neg Hx     Ulcerative colitis Neg Hx     Liver disease Neg Hx     Liver cancer Neg Hx     Crohn's disease Neg Hx     Melanoma Neg Hx        Social History:  Social History     Tobacco Use    Smoking status: Former     Current packs/day: 0.00     Average packs/day: 0.5 packs/day for 40.0 years (20.0 ttl pk-yrs)     Types: Cigarettes     Start date: 1982     Quit date: 2022     Years since quittin.6    Smokeless tobacco: Never    Tobacco comments:     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     Patient is currently retired   Substance Use Topics    Alcohol use: No    Drug use: No       Review of Systems:  Pertinent items are noted in HPI. All other systems are reviewed and are negative.     Objective:   Last 24 Hour Vital Signs:  BP  Min: 127/76  Max: 211/94  Temp  Av °F (36.7 °C)  Min: 97.8 °F (36.6 °C)  Max: 98.2 °F (36.8 °C)  Pulse  Av.5  Min: 60  Max: 90  Resp  Av.9  Min: 16  Max: 22  SpO2  Av.1 %  Min: 96 %  Max: 100 %  Weight  Av kg (205 lb)  Min: 93 kg (205 lb)  Max: 93 kg (205 lb)  No  intake/output data recorded.    Physical Examination:  General: Awake and alert, cooperative, interactive on exam, able to speak in full sentences without issue, no active squeezing chest pain on eval   HEENT: Normocephalic, atraumatic, EOMI, clear conjunctiva, moist mucous membranes  CV: RRR, no murmurs, left chest wall TTP   Resp: Slight crackles at right base, no wheezing, no increased WOB  Abd: +BS, soft and full, non-tender   Extremities: 2+ distal pulses in bilateral upper and lower extremities, warm and well perfused with no clubbing, cyanosis, or edema  Skin: Warm and dry, prior LUE graft site scars where removed   Neuro: Alert and oriented, no focal deficits   Psych: Appropriate affect and insight      Laboratory:  Trended Lab Data:  Recent Labs     10/25/23  0523   WBC 9.63   HGB 9.3*   HCT 28.8*         K 4.5      CO2 17*   BUN 47*   CREATININE 5.4*   GLU 94   BILITOT 0.4   AST 29   ALT 22   ALKPHOS 78   CALCIUM 7.8*   ALBUMIN 3.0*   PROT 7.3       Cardiac:   Recent Labs   Lab 10/25/23  0523 10/25/23  0856 10/25/23  1336   TROPONINI 0.039* 0.044* 0.060*   *  --   --        Urinalysis:   Lab Results   Component Value Date    LABURIN No growth 04/11/2023    COLORU Yellow 10/13/2023    SPECGRAV 1.010 10/13/2023    NITRITE Negative 10/13/2023    KETONESU Negative 10/13/2023    UROBILINOGEN Negative 10/15/2018       Microbiology:  Microbiology Results (last 7 days)       ** No results found for the last 168 hours. **            Radiology:  I have personally reviewed the above labs and imaging.    Current Medications:     Infusions:   furosemide (LASIX) 2 mg/mL continuous infusion (non-titrating) 20 mg/hr (10/25/23 1553)        Scheduled:   amiodarone  200 mg Oral Daily    apixaban  5 mg Oral BID    atorvastatin  40 mg Oral Daily    clopidogreL  75 mg Oral Daily    doxazosin  4 mg Oral Q12H    famotidine  20 mg Oral BID    ferrous sulfate  1 tablet Oral Daily    [START ON 10/26/2023]  isosorbide mononitrate  60 mg Oral Daily    metoprolol succinate  12.5 mg Oral BID    paricalcitoL  1 mcg Oral Every Mon, Wed, Fri    predniSONE  5 mg Oral Daily    tacrolimus  3 mg Oral BID        PRN:  bisacodyL, dextrose 10%, dextrose 10%, glucagon (human recombinant), glucose, glucose, hydrALAZINE, labetalol, melatonin, polyethylene glycol, promethazine, sodium chloride 0.9%     Assessment:     Ibrahima Phelps is a 67 y.o. male with h/o CAD s/p stent (2006), renal transplant x2 (last 2005) with resultant CKD3 (baseline Cr a couple months ago 2.5-2.9, on tacro), HTN, HLD, tachycardia-bradycardia syndrome (pAfib), HFpEF, hiatal hernia, reflux, and COPD who presented to [facility] ED with squeezing chest pain and SOB relieved with nitroglycerin. ICU consulted for persistent hypertension. Pt with improvement in BP (160s/90s) on most recent check, on lasix drip per cardiology and transplant nephro. Stable for floor at this time, continue current management, call back if pressures get uncontrolled. Pt aware of risks associated with further workup for chest pain and would like to proceed with further investigation due to significance of episodes and repeated hospitalization.     Critical Care will sign off. Please call back with questions or concerns.         Micki Hansen MD  [facility] Fellow   10/25/2023 4:20 PM

## 2023-10-25 NOTE — ASSESSMENT & PLAN NOTE
Creatine stable for now. BMP reviewed- noted Estimated Creatinine Clearance: 14.8 mL/min (A) (based on SCr of 5.4 mg/dL (H)). according to latest data. Monitor UOP and serial BMP and adjust therapy as needed. Renally dose meds.  - Cr 5.4 (last known baseline ~4)  - continue to monitor daily CMP  - Nephrology consulted due to worsening kidney function  - avoid nephrotoxic medications

## 2023-10-25 NOTE — ASSESSMENT & PLAN NOTE
Hypertensive  - continue home Imdur, metoprolol, hydralazine and doxazosin  - up titrate meds as needed  - continue to monitor vitals closely

## 2023-10-25 NOTE — ED PROVIDER NOTES
Encounter Date: 10/25/2023       History     Chief Complaint   Patient presents with    Chest Pain     Epigastric/lower chest pain, increased work of breathing noted. Took 1 nitro tab at home with relief. Hx of kidney transplant.      This patient is a 67-year-old Past Medical History:  No date: Atrial fibrillation  2006: CAD (coronary artery disease)      Comment:  MI in 2006  2017: CHF (congestive heart failure)  No date: CKD (chronic kidney disease) stage 3, GFR 30-59 ml/min      Comment:  Dr. Latif.  in transplanted kidney  2/7/2023: COVID-19  No date: Diverticulosis  No date: Hyperlipidemia  No date: Hypertension  No date: Keloid cicatrix  No date: Metabolic bone disease  10/1/2019: Other emphysema  No date: Pericarditis  1992: S/P kidney transplant      Comment:  x2 (1992 & 2005),  No date: Thrombocytopenia  No date: Thyroid disease  09/05/2014: Tobacco use  Presenting to reports of central chest pain that is burning in quality.  He additionally reports shortness of breath and increased work of breathing increasing over the last few days.  He reports he was recently admitted for fluid on his lungs but they additionally had difficulty diuresing him because of acute on chronic kidney injury.  He reports he has been taking his Lasix as prescribed but is not significantly putting out much urine.  He reports he is having difficulty laying flat over the last few days.  He reports receiving nitro at home which improved his chest pain and currently is not having any.  He denies swelling of his arms or his legs, nausea, vomiting, abdominal pain.  He denies he has home oxygen.  He reports he has been taking his immunosuppressants and Eliquis as prescribed.      Review of patient's allergies indicates:   Allergen Reactions    Ace inhibitors Swelling    Verapamil Other (See Comments)     Other reaction(s): Unknown    Povidone-iodine Itching     Past Medical History:   Diagnosis Date    Atrial fibrillation     CAD  "(coronary artery disease) 2006    MI in 2006    CHF (congestive heart failure) 2017    CKD (chronic kidney disease) stage 3, GFR 30-59 ml/min     Dr. Latif.  in transplanted kidney    COVID-19 2/7/2023    Diverticulosis     Hyperlipidemia     Hypertension     Keloid cicatrix     Metabolic bone disease     Other emphysema 10/1/2019    Pericarditis     S/P kidney transplant 1992    x2 (1992 & 2005),    Thrombocytopenia     Thyroid disease     Tobacco use 09/05/2014     Past Surgical History:   Procedure Laterality Date    CARDIOVERSION  04/30/15    CHOLECYSTECTOMY      COLONOSCOPY  April 20, 2011    Diverticulosis, repeat recommended in 3 yrs., repeat colonoscopy 2014 revealed 2 polyps.  He should return in 5 years.    COLONOSCOPY N/A 3/13/2020    Procedure: COLONOSCOPY;  Surgeon: Chon Casper MD;  Location: [facility] (4TH FLR);  Service: Endoscopy;  Laterality: N/A;  ok to hold coumadin x5days-see telephone encounter 2/4/20-tb    COLONOSCOPY N/A 2/4/2021    Procedure: COLONOSCOPY;  Surgeon: Chon Casper MD;  Location: [facility] (4TH FLR);  Service: Endoscopy;  Laterality: N/A;  Eliquis - per Dr. GAVIN chua to hold x 2 days and "restarted asap"- ERW  last prep "poor", [id number] ordered/ Pt requests Dr. Casper  prep ins. mailed - COVID screening on 2/1/21 PCW- ERW    COLONOSCOPY N/A 3/3/2021    Procedure: COLONOSCOPY;  Surgeon: Chon Casper MD;  Location: [facility] (4TH FLR);  Service: Endoscopy;  Laterality: N/A;  Patient with his second poor bowel pre and has poor prep today.  If patient not intersted or can't get colonoscopy tomorrow will need constipation bowel prep and will need to restart his Eliquis today.Thanks,Chon     per Dr Blunt-ok to hold Eliquis 2 days prior      COVID     CORONARY ANGIOPLASTY WITH STENT PLACEMENT  9/13/2006    IRRIGATION AND DEBRIDEMENT Right 8/30/2023    Procedure: IRRIGATION AND DEBRIDEMENT, RIGHT MIDDLE FINGER;  Surgeon: Martin Larsen MD;  Location: [facility] " OR 2ND FLR;  Service: Orthopedics;  Laterality: Right;    KIDNEY TRANSPLANT      PARATHYROIDECTOMY      TREATMENT OF CARDIAC ARRHYTHMIA N/A 3/28/2022    Procedure: CARDIOVERSION;  Surgeon: Migue Blunt MD;  Location: [facility] EP LAB;  Service: Cardiology;  Laterality: N/A;  AF, DCC, MAC, GP, 3 PREP*RODRIGO deferred pt 100% compliant*     Family History   Problem Relation Age of Onset    No Known Problems Sister     No Known Problems Brother     Thyroid disease Mother         s/p surgery    Heart disease Father         had pacemaker    No Known Problems Sister     Kidney failure Sister     Kidney disease Sister     No Known Problems Sister     Kidney disease Brother     Kidney failure Brother         s/p transplant    Diabetes Mellitus Neg Hx     Stroke Neg Hx     Heart attack Neg Hx     Cancer Neg Hx     Celiac disease Neg Hx     Cirrhosis Neg Hx     Colon cancer Neg Hx     Esophageal cancer Neg Hx     Inflammatory bowel disease Neg Hx     Rectal cancer Neg Hx     Stomach cancer Neg Hx     Ulcerative colitis Neg Hx     Liver disease Neg Hx     Liver cancer Neg Hx     Crohn's disease Neg Hx     Melanoma Neg Hx      Social History     Tobacco Use    Smoking status: Former     Current packs/day: 0.00     Average packs/day: 0.5 packs/day for 40.0 years (20.0 ttl pk-yrs)     Types: Cigarettes     Start date: 1982     Quit date: 2022     Years since quittin.6    Smokeless tobacco: Never    Tobacco comments:     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     Patient is currently retired   Substance Use Topics    Alcohol use: No    Drug use: No     Review of Systems   Constitutional:  Positive for fatigue.   HENT:  Positive for congestion.    Respiratory:  Positive for cough and shortness of breath.    Cardiovascular:  Positive for chest pain.   Gastrointestinal:  Negative for abdominal pain.   Genitourinary:  Positive for decreased urine volume.   Musculoskeletal:  Negative  for back pain.   Skin:  Negative for rash.   Hematological:  Bruises/bleeds easily.   Psychiatric/Behavioral:  Negative for confusion.        Physical Exam     Initial Vitals [10/25/23 0501]   BP Pulse Resp Temp SpO2   127/76 90 16 97.8 °F (36.6 °C) 100 %      MAP       --         Physical Exam    Nursing note and vitals reviewed.  Constitutional: Vital signs are normal. He appears well-nourished.   HENT:   Head: Normocephalic and atraumatic.   Eyes: Conjunctivae and EOM are normal.   Neck: Neck supple. No thyroid mass present.   Normal range of motion.  Cardiovascular:  Normal rate, regular rhythm and normal heart sounds.           Pulmonary/Chest: Breath sounds normal. He has no wheezes. He has no rhonchi. He has no rales.   Abdominal: Abdomen is soft. Bowel sounds are normal. There is no abdominal tenderness.   Musculoskeletal:         General: No edema.      Cervical back: Normal range of motion and neck supple.     Neurological: He is alert and oriented to person, place, and time. He has normal strength. No cranial nerve deficit or sensory deficit. GCS score is 15. GCS eye subscore is 4. GCS verbal subscore is 5. GCS motor subscore is 6.   Skin: Skin is warm and dry. No rash noted. No erythema.   Psychiatric: He has a normal mood and affect. His speech is normal and behavior is normal. Thought content normal. Cognition and memory are normal.         ED Course   Procedures  Labs Reviewed   CBC W/ AUTO DIFFERENTIAL - Abnormal; Notable for the following components:       Result Value    RBC 3.66 (*)     Hemoglobin 9.3 (*)     Hematocrit 28.8 (*)     MCV 79 (*)     MCH 25.4 (*)     RDW 19.0 (*)     Lymph % 16.7 (*)     All other components within normal limits   COMPREHENSIVE METABOLIC PANEL - Abnormal; Notable for the following components:    CO2 17 (*)     BUN 47 (*)     Creatinine 5.4 (*)     Calcium 7.8 (*)     Albumin 3.0 (*)     eGFR 10.8 (*)     All other components within normal limits   TROPONIN I -  Abnormal; Notable for the following components:    Troponin I 0.039 (*)     All other components within normal limits   B-TYPE NATRIURETIC PEPTIDE - Abnormal; Notable for the following components:     (*)     All other components within normal limits   TROPONIN ISTAT   TROPONIN I   POCT TROPONIN   POCT TROPONIN     EKG Readings: (Independently Interpreted)   Initial Reading: No STEMI. Previous EKG: Compared with most recent EKG   Independent interpretation:  Sinus rhythm with premature atrial complexes, 72 beats per minute, normal axis, left ventricular hypertrophy with repolarization abnormality, prolonged QT,  milliseconds, no STEMI     ECG Results              EKG 12-lead (Final result)  Result time 10/25/23 07:45:16      Final result by Interface, Lab In [facility] (10/25/23 07:45:16)                   Narrative:    Test Reason : R07.9,    Vent. Rate : 072 BPM     Atrial Rate : 072 BPM     P-R Int : 160 ms          QRS Dur : 102 ms      QT Int : 440 ms       P-R-T Axes : 082 062 103 degrees     QTc Int : 481 ms    Sinus rhythm with Premature atrial complexes  LVH with repolarization abnormality  Prolonged QT  Abnormal ECG  When compared with ECG of 18-OCT-2023 10:36,  Sinus rhythm has replaced Ectopic atrial rhythm  Nonspecific T wave abnormality no longer evident in Inferior leads  Nonspecific T wave abnormality no longer evident in Anterior leads  Confirmed by Luke EDGE MD ([id number]) on 10/25/2023 7:45:02 AM    Referred By: [email or web address]   SELF           Confirmed By:Luke EDGE MD                                  Imaging Results              X-Ray Chest AP Portable (Final result)  Result time 10/25/23 06:06:05      Final result by Sd Altamirano MD (10/25/23 06:06:05)                   Impression:      Worsening bibasilar pulmonary opacities which may be related to infectious/inflammatory process versus atelectasis or pulmonary edema in the appropriate clinical setting.    Possible small bilateral  "pleural effusions.      Electronically signed by: Sd Altmairano MD  Date:    10/25/2023  Time:    06:06               Narrative:    EXAMINATION:  XR CHEST AP PORTABLE    CLINICAL HISTORY:  Provided history is "Chest Pain;  ".    TECHNIQUE:  One view of the chest.    COMPARISON:  10/13/2023.    FINDINGS:  Cardiac wires overlie the chest.  Cardiomediastinal silhouette is stable.  Atherosclerotic calcifications overlie the aortic arch.  Lungs are mildly hyperinflated suggestive of pulmonary emphysema.  There are bibasilar interstitial and airspace opacities, with mildly worse aeration when compared with 10/13/2023.  Wedge like region of opacification in the right lung base which may be related to atelectasis or consolidation.  Possible small bilateral pleural effusions.  No distinct pneumothorax.  Calcified granulomata noted in the left upper lung with calcified mediastinal lymph nodes.                                       Medications   amiodarone tablet 200 mg (200 mg Oral Given 10/25/23 0832)   doxazosin tablet 4 mg (4 mg Oral Given 10/25/23 0832)   clopidogreL tablet 75 mg (75 mg Oral Given 10/25/23 0832)   isosorbide mononitrate 24 hr tablet 30 mg (30 mg Oral Given 10/25/23 0833)   sodium chloride 0.9% flush 10 mL (has no administration in time range)   melatonin tablet 6 mg (has no administration in time range)   promethazine tablet 25 mg (has no administration in time range)   polyethylene glycol packet 17 g (has no administration in time range)   bisacodyL suppository 10 mg (has no administration in time range)   glucose chewable tablet 16 g (has no administration in time range)   glucose chewable tablet 24 g (has no administration in time range)   glucagon (human recombinant) injection 1 mg (has no administration in time range)   dextrose 10% bolus 125 mL 125 mL (has no administration in time range)   dextrose 10% bolus 250 mL 250 mL (has no administration in time range)   amiodarone tablet 200 mg (has no " administration in time range)   apixaban tablet 5 mg (has no administration in time range)   atorvastatin tablet 40 mg (has no administration in time range)   clopidogreL tablet 75 mg (has no administration in time range)   doxazosin tablet 4 mg (has no administration in time range)   famotidine tablet 20 mg (has no administration in time range)   ferrous sulfate tablet 1 each (has no administration in time range)   hydrALAZINE tablet 50 mg (has no administration in time range)   isosorbide mononitrate 24 hr tablet 60 mg (has no administration in time range)   metoprolol succinate (TOPROL-XL) 24 hr split tablet 12.5 mg (has no administration in time range)   paricalcitoL capsule 1 mcg (has no administration in time range)   predniSONE tablet 5 mg (has no administration in time range)   tacrolimus capsule 3 mg (has no administration in time range)   apixaban tablet 5 mg (5 mg Oral Given 10/25/23 0832)   hydrALAZINE tablet 50 mg (50 mg Oral Given 10/25/23 0733)     Medical Decision Making  Pt rapidly assessed. Initial VS sig for HTN. Pt markedly dyspneic with minimal exertion, requiring oxygen. Pain resolved after intro. Initial DDx include, but not limited to, acute decomp CHF, acute on chronic renal injury, BL atypical PNA/sepsis/shock, pleural effusion, PE, PTX, pericardial effusions, hemothorax.     Provided his multiple morning antiHTN and thinners. Concern for worsening BL pulm edema in the setting of worsening renal function. CXR demonstrates progressing BL pulm infiltrates with I suspect reflect worsening edema. BNP is 860, trop 0.04, Cr 5.4, BUN 47. His worsening renal performance with progressing edema warrants inpatient management to maximize diuretic therapy, nephro consult, possible dialysis initiation. Case d/w and accepted by on call hospitalist bed placement. Pt and wife in agreement with  disposition.     Amount and/or Complexity of Data Reviewed  Independent Historian: spouse  Labs: ordered.  Decision-making details documented in ED Course.  Radiology: ordered. Decision-making details documented in ED Course.  ECG/medicine tests:  Decision-making details documented in ED Course.    Risk  Prescription drug management.                               Clinical Impression:   Final diagnoses:  [R07.9] Chest pain  [I50.1] Acute pulmonary edema with congestive heart failure        ED Disposition Condition    Observation                 Catalino Hernandez MD  10/25/23 5811

## 2023-10-25 NOTE — H&P
"[facility] - Emergency Dept  [facility]  History & Physical    Patient Name: Ibrahima Phelps  MRN: [id number]  Patient Class: OP- Observation  Admission Date: 10/25/2023  Attending Physician: Artis Godoy MD   Primary Care Provider: Anatoliy Colindres MD         Patient information was obtained from patient, past medical records and ER records.     Subjective:     Principal Problem:SOB (shortness of breath)    Chief Complaint:   Chief Complaint   Patient presents with    Chest Pain     Epigastric/lower chest pain, increased work of breathing noted. Took 1 nitro tab at home with relief. Hx of kidney transplant.         HPI: Ibrahima Phelps is a 67 y.o. male with a hx of CHF, HTN, Afib, CKD, and kidney transplant presents to the ED for shortness of breath. Of note, patient admitted last week for similar presentation. States he has been experiencing increased shortness of breath since discharge. Feels as though "he cannot get enough air in." Has associated stomach/ chest cramps when he feels short of breath. Symptoms are worse on exertion. States he took his lasix yesterday and has been taking it PRN due to his kidney function. Denies weighing himself at home. States he is complaint with his medications and does not miss any doses. BP at home has been running with systolic 180s-190s and sometimes will drop to systolic 130s-140s. Denies home O2 use. Denies fever, chills, cough, congestion, chest pain, abdominal pain, constipation, diarrhea, lower extremity swelling and urinary symptoms. States he was having very little urine output last admission but is unsure if it is back to baseline.     ED: AF, Hypertensive. Hgb 9.3. Cr 5.4. Tn 0.039. . EKG showed sinus rhythm with PACs. CXR showed worsening bibasilar pulmonary opacities which may be related to infectious/inflammatory process versus atelectasis or pulmonary edema in the appropriate clinical setting. Possible small bilateral pleural effusions. Given amio, " "Eliquis, plavix, doxazosin, hydralazine, and imdur in the ED.          Past Medical History:   Diagnosis Date    Atrial fibrillation     CAD (coronary artery disease) 2006    MI in 2006    CHF (congestive heart failure) 2017    CKD (chronic kidney disease) stage 3, GFR 30-59 ml/min     Dr. Latif.  in transplanted kidney    COVID-19 2/7/2023    Diverticulosis     Hyperlipidemia     Hypertension     Keloid cicatrix     Metabolic bone disease     Other emphysema 10/1/2019    Pericarditis     S/P kidney transplant 1992    x2 (1992 & 2005),    Thrombocytopenia     Thyroid disease     Tobacco use 09/05/2014       Past Surgical History:   Procedure Laterality Date    CARDIOVERSION  04/30/15    CHOLECYSTECTOMY      COLONOSCOPY  April 20, 2011    Diverticulosis, repeat recommended in 3 yrs., repeat colonoscopy 2014 revealed 2 polyps.  He should return in 5 years.    COLONOSCOPY N/A 3/13/2020    Procedure: COLONOSCOPY;  Surgeon: Chon Casper MD;  Location: [facility] (4TH FLR);  Service: Endoscopy;  Laterality: N/A;  ok to hold coumadin x5days-see telephone encounter 2/4/20-tb    COLONOSCOPY N/A 2/4/2021    Procedure: COLONOSCOPY;  Surgeon: Chon Casper MD;  Location: [facility] (4TH FLR);  Service: Endoscopy;  Laterality: N/A;  Eliquis - per Dr. GAVIN chua to hold x 2 days and "restarted asap"- ERW  last prep "poor", [id number] ordered/ Pt requests Dr. Casper  prep ins. mailed - COVID screening on 2/1/21 PCW- ERW    COLONOSCOPY N/A 3/3/2021    Procedure: COLONOSCOPY;  Surgeon: Chon Casper MD;  Location: [facility] (4TH FLR);  Service: Endoscopy;  Laterality: N/A;  Patient with his second poor bowel pre and has poor prep today.  If patient not intersted or can't get colonoscopy tomorrow will need constipation bowel prep and will need to restart his Eliquis today.Thanks,Chon Blunt-ok to hold Eliquis 2 days prior      COVID     CORONARY ANGIOPLASTY WITH STENT PLACEMENT  " 9/13/2006    IRRIGATION AND DEBRIDEMENT Right 8/30/2023    Procedure: IRRIGATION AND DEBRIDEMENT, RIGHT MIDDLE FINGER;  Surgeon: Martin Larsen MD;  [facility];  Service: Orthopedics;  Laterality: Right;    KIDNEY TRANSPLANT  2005    PARATHYROIDECTOMY      TREATMENT OF CARDIAC ARRHYTHMIA N/A 3/28/2022    Procedure: CARDIOVERSION;  Surgeon: Migue Blunt MD;  Location: [facility] EP LAB;  Service: Cardiology;  Laterality: N/A;  AF, DCC, MAC, GP, 3 PREP*RODRIGO deferred pt 100% compliant*       Review of patient's allergies indicates:   Allergen Reactions    Ace inhibitors Swelling    Verapamil Other (See Comments)     Other reaction(s): Unknown    Povidone-iodine Itching       No current facility-administered medications on file prior to encounter.     Current Outpatient Medications on File Prior to Encounter   Medication Sig    acetaminophen (TYLENOL) 500 MG tablet Take 1 tablet (500 mg total) by mouth every 6 (six) hours as needed for Pain.    albuterol (PROVENTIL) 2.5 mg /3 mL (0.083 %) nebulizer solution Take 3 mLs (2.5 mg total) by nebulization every 6 (six) hours as needed for Wheezing. Rescue    albuterol (VENTOLIN HFA) 90 mcg/actuation inhaler Inhale 2 puffs into the lungs every 6 (six) hours as needed for Wheezing or Shortness of Breath. Rescue    amiodarone (PACERONE) 200 MG Tab Take 1 tablet (200 mg total) by mouth once daily.    ammonium lactate 12 % Crea Apply 1 g topically once daily.    apixaban (ELIQUIS) 5 mg Tab Take 1 tablet (5 mg total) by mouth 2 (two) times daily.    atorvastatin (LIPITOR) 40 MG tablet Take 1 tablet (40 mg total) by mouth once daily.    b complex vitamins (B COMPLEX-VITAMIN B12) tablet Take 1 tablet by mouth once daily.    calcium carbonate (CALCIUM 600 ORAL) Take 1 tablet by mouth 2 (two) times a day.    clopidogreL (PLAVIX) 75 mg tablet Take 1 tablet (75 mg total) by mouth once daily.    doxazosin (CARDURA) 4 MG tablet Take 1 tablet (4 mg  total) by mouth every 12 (twelve) hours.    famotidine (PEPCID) 20 MG tablet Take 1 tablet (20 mg total) by mouth 2 (two) times daily.    ferrous sulfate 325 (65 FE) MG EC tablet Take 1 tablet (325 mg total) by mouth once daily.    fexofenadine HCl (ALLEGRA ORAL) Take 1 tablet by mouth daily as needed (allergies).    fluticasone propion-salmeterol 115-21 mcg/dose (ADVAIR HFA) 115-21 mcg/actuation HFAA inhaler Inhale 2 puffs into the lungs every 12 (twelve) hours. Controller    fluticasone propionate (FLONASE) 50 mcg/actuation nasal spray 1 spray (50 mcg total) by Each Nostril route daily as needed for Allergies.    furosemide (LASIX) 40 MG tablet Take 0.5 tablets (20 mg total) by mouth 2 (two) times daily. HOLD this medication on discharge per nephrology recs, and resume only once needed for lower extremity edema. (Patient taking differently: Take 20 mg by mouth every other day.)    gabapentin (NEURONTIN) 100 MG capsule Take 1 capsule (100 mg total) by mouth as needed (pain).    hydrALAZINE (APRESOLINE) 50 MG tablet TAKE 1 TABLET (50 MG TOTAL) BY MOUTH 3 (THREE) TIMES DAILY.    isosorbide mononitrate (IMDUR) 30 MG 24 hr tablet Take 2 tablets (60 mg total) by mouth once daily.    metoprolol succinate (TOPROL-XL) 25 MG 24 hr tablet Take 1 tablet (25 mg total) by mouth once daily.    MITIGARE 0.6 mg Cap TAKE 1 CAPSULE BY MOUTH TWICE A DAY AS NEEDED GOUT FLARE UP TO 3 DAYS    multivitamin (THERAGRAN) per tablet Take 1 tablet by mouth Daily.    NIFEdipine (PROCARDIA-XL) 60 MG (OSM) 24 hr tablet Take 1 tablet (60 mg total) by mouth 2 (two) times a day. (Patient not taking: Reported on 10/23/2023)    nitroGLYCERIN (NITROSTAT) 0.4 MG SL tablet Place 1 tablet (0.4 mg total) under the tongue every 5 (five) minutes as needed for Chest pain. (Patient not taking: Reported on 10/23/2023)    omeprazole (PRILOSEC) 20 MG capsule Take 1 capsule (20 mg total) by mouth daily as needed (heartburn).    paricalcitoL  (ZEMPLAR) 1 MCG capsule TAKE 1 CAPSULE ON MONDAY, WEDNESDAY AND FRIDAY    predniSONE (DELTASONE) 5 MG tablet TAKE 1 TABLET BY MOUTH EVERY DAY IN THE MORNING    pulse oximeter (PULSE OXIMETER) device by Apply Externally route 2 (two) times a day. Use twice daily at 8 AM and 3 PM and record the value in MyChart as directed.    tacrolimus (PROGRAF) 1 MG Cap Take 3 capsules (3 mg total) by mouth every 12 (twelve) hours.    vitamin D 1000 units Tab Take 370 mg by mouth 2 (two) times daily. 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     Patient is currently retired   Substance and Sexual Activity    Alcohol use: No    Drug use: No    Sexual activity: Yes     Partners: Female     Review of Systems   Constitutional:  Positive for activity change. Negative for chills and fever.   Respiratory:  Positive for shortness of breath. Negative for chest tightness.    Cardiovascular:  Positive for chest pain (pleuritic, resolved). Negative for leg swelling.   Gastrointestinal:  Negative for abdominal pain, constipation, diarrhea, nausea and vomiting.   Genitourinary:  Negative for dysuria and frequency.   Neurological:  Negative for dizziness and weakness.     Objective:     Vital Signs (Most Recent):  Temp: 97.8 °F (36.6 °C) (10/25/23 0501)  Pulse: 66 (10/25/23 0759)  Resp: 20 (10/25/23 075)  BP: (!) 198/91 (10/25/23 0703)  SpO2: 97 % (10/25/23 075)  Vital Signs (24h Range):  Temp:  [97.8 °F (36.6 °C)] 97.8 °F (36.6 °C)  Pulse:  [64-90] 66  Resp:  [16-20] 20  SpO2:  [97 %-100 %] 97 %  BP: (127-198)/(76-91) 198/91     Weight: 93 kg (205 lb)  Body mass index is 27.05 kg/m².     Physical Exam  Vitals and nursing note reviewed.   Constitutional:       Appearance: He is well-developed.      Comments: Pleasant on exam, speaking in full sentences. On RA   Eyes:      Pupils: Pupils are equal, round, and reactive to light.   Cardiovascular:      Rate and Rhythm: Normal rate and regular rhythm.   Pulmonary:      Effort: Pulmonary effort is normal.      Breath sounds: Normal breath sounds.   Abdominal:      Palpations: Abdomen is soft.      Tenderness: There is no abdominal tenderness.   Musculoskeletal:         General: No tenderness.      Right lower leg: No edema.      Left lower leg: No edema.   Skin:     General: Skin is warm and dry.   Neurological:      Mental Status: He is alert and oriented to person, place, and time.   Psychiatric:         Behavior: Behavior normal.              CRANIAL NERVES     CN III, IV, VI   Pupils are equal, round, and reactive to light.       Significant Labs: All pertinent labs within the past 24 hours have been reviewed.  BMP:   Recent Labs   Lab 10/25/23  0523   GLU 94      K 4.5      CO2 17*   BUN 47*   CREATININE 5.4*   CALCIUM 7.8*     CBC:   Recent Labs   Lab 10/25/23  0523   WBC 9.63   HGB 9.3*   HCT 28.8*          Significant Imaging: I have reviewed all pertinent imaging results/findings within the past 24 hours.    Imaging Results              X-Ray Chest AP Portable (Final result)  Result time 10/25/23 06:06:05      Final result by Sd Altamirano MD (10/25/23 06:06:05)                   Impression:      Worsening bibasilar pulmonary opacities which may be related to infectious/inflammatory process versus atelectasis or pulmonary edema in the appropriate clinical setting.    Possible small bilateral  "pleural effusions.      Electronically signed by: Sd Altamirano MD  Date:    10/25/2023  Time:    06:06               Narrative:    EXAMINATION:  XR CHEST AP PORTABLE    CLINICAL HISTORY:  Provided history is "Chest Pain;  ".    TECHNIQUE:  One view of the chest.    COMPARISON:  10/13/2023.    FINDINGS:  Cardiac wires overlie the chest.  Cardiomediastinal silhouette is stable.  Atherosclerotic calcifications overlie the aortic arch.  Lungs are mildly hyperinflated suggestive of pulmonary emphysema.  There are bibasilar interstitial and airspace opacities, with mildly worse aeration when compared with 10/13/2023.  Wedge like region of opacification in the right lung base which may be related to atelectasis or consolidation.  Possible small bilateral pleural effusions.  No distinct pneumothorax.  Calcified granulomata noted in the left upper lung with calcified mediastinal lymph nodes.                                        Assessment/Plan:     * SOB (shortness of breath)  Patient presents with worsening SOB on exertion since discharge last week.  - AF, Hypertensive  - O2 100% on RA  - , Tn 0.039  - CXR showed worsening bibasilar pulmonary opacities which may be related to infectious/inflammatory process versus atelectasis or pulmonary edema in the appropriate clinical setting. Possible small bilateral pleural effusions  - will hold on diuresis until evaluated by consult services given complicate case  - Cardiology consulted for diuretic recs given kidney function  - walk test pending  Results for orders placed during the hospital encounter of 10/13/23    Echo    Interpretation Summary    Left Ventricle: The left ventricle is normal in size. Ventricular mass is normal. Normal wall thickness. Normal wall motion. There is normal systolic function with a visually estimated ejection fraction of 60 - 65%. Grade II diastolic dysfunction.    Left Atrium: Left atrium is severely dilated.    Right Ventricle: Normal " right ventricular cavity size. Wall thickness is normal. Right ventricle wall motion  is normal. Systolic function is normal.    Right Atrium: Right atrium is dilated.    Aortic Valve: Moderately calcified cusps. There is moderate annular calcification present. Mildly restricted motion. There is mild stenosis. Aortic valve area by VTI is 1.85 cm². Aortic valve peak velocity is 2.12 m/s. Mean gradient is 9 mmHg. The dimensionless index is 0.49. There is mild to moderate aortic regurgitation.    Pulmonary Artery: The estimated pulmonary artery systolic pressure is 59 mmHg.    IVC/SVC: Normal venous pressure at 3 mmHg.      CKD (chronic kidney disease), stage IV  Creatine stable for now. BMP reviewed- noted Estimated Creatinine Clearance: 14.8 mL/min (A) (based on SCr of 5.4 mg/dL (H)). according to latest data. Monitor UOP and serial BMP and adjust therapy as needed. Renally dose meds.  - Cr 5.4 (last known baseline ~4)  - continue to monitor daily CMP  - Nephrology consulted due to worsening kidney function  - avoid nephrotoxic medications    Anemia in stage 4 chronic kidney disease  - Hgb 9.3  - continue to monitor daily CBC  - transfuse Hgb < 7      Chronic obstructive pulmonary disease  - hx noted  - patient uses home inhalers PRN  - duo nebs PRN  - not on home O2  - keep O2 sats 88-92%    Paroxysmal atrial fibrillation  Patient with Paroxysmal (<7 days) atrial fibrillation which is controlled currently with Beta Blocker and Amiodarone. Patient is currently in sinus rhythm.[id number] Score: 2. Anticoagulation indicated. Anticoagulation done with Eliquis.  - continue tele    Hypertensive heart and kidney disease  Hypertensive  - continue home Imdur, metoprolol, hydralazine and doxazosin  - up titrate meds as needed  - continue to monitor vitals closely     H/O kidney transplant  - hx noted  - continue home prednisone and prograf  - prograf level pending  - KTM consulted, appreciate assistance     Mixed  hyperlipidemia  - continue statin    CAD (coronary artery disease)  Patient with known CAD, which is controlled   - Will continue Plavix and Statin and monitor for S/Sx of angina/ACS.   - Continue to monitor on telemetry  - pt is currently chest pain free  - Tn 0.039, will continue to trend  - EKG w/o acute ischemic changes  - Cardiology consulted, appreciate recs  - STAT EKG and trop if chest pain occurs       VTE Risk Mitigation (From admission, onward)         Ordered     apixaban tablet 5 mg  2 times daily         10/25/23 0830                     On 10/25/2023, patient should be placed in hospital observation services under my care in collaboration with Dr. Artis Godoy.          VINICIUS BernsteinC  Department of Hospital Medicine  [facility] - Emergency Dept

## 2023-10-25 NOTE — PHARMACY MED REC
"Admission Medication History     The home medication history was taken by Caroline Hinds.    You may go to "Admission" then "Reconcile Home Medications" tabs to review and/or act upon these items.     The home medication list has been updated by the Pharmacy department.   Please read ALL comments highlighted in yellow.   Please address this information as you see fit.    Feel free to contact us if you have any questions or require assistance.          Medications listed below were obtained from: Patient/family and Analytic software- [employer]  Current Outpatient Medications on File Prior to Encounter   Medication Sig    acetaminophen (TYLENOL) 500 MG tablet Take 1 tablet (500 mg total) by mouth every 6 (six) hours as needed for Pain.    albuterol (PROVENTIL) 2.5 mg /3 mL (0.083 %) nebulizer solution Take 3 mLs (2.5 mg total) by nebulization every 6 (six) hours as needed for Wheezing. Rescue    albuterol (VENTOLIN HFA) 90 mcg/actuation inhaler Inhale 2 puffs into the lungs every 6 (six) hours as needed for Wheezing or Shortness of Breath. Rescue    amiodarone (PACERONE) 200 MG Tab Take 1 tablet (200 mg total) by mouth once daily.    ammonium lactate 12 % Crea Apply 1 g topically daily as needed (dry skin).    apixaban (ELIQUIS) 5 mg Tab Take 1 tablet (5 mg total) by mouth 2 (two) times daily.    b complex vitamins (B COMPLEX-VITAMIN B12) tablet Take 1 tablet by mouth once daily.    calcium carbonate (CALCIUM 600 ORAL) Take 1 tablet by mouth 2 (two) times a day.    CHOLECALCIFEROL, VITAMIN D3, ORAL Take 2 tablets by mouth 2 (two) times daily.    clopidogreL (PLAVIX) 75 mg tablet Take 1 tablet (75 mg total) by mouth once daily.    doxazosin (CARDURA) 4 MG tablet Take 1 tablet (4 mg total) by mouth every 12 (twelve) hours.    famotidine (PEPCID) 20 MG tablet Take 1 tablet (20 mg total) by mouth 2 (two) times daily.    ferrous sulfate 325 (65 FE) MG EC tablet Take 1 tablet (325 mg total) by mouth once daily.    " fexofenadine HCl (ALLEGRA ORAL) Take 1 tablet by mouth daily as needed (allergies).    fluticasone propion-salmeterol 115-21 mcg/dose (ADVAIR HFA) 115-21 mcg/actuation HFAA inhaler Inhale 2 puffs into the lungs 2 (two) times daily as needed (shortness of breath). Controller    fluticasone propionate (FLONASE) 50 mcg/actuation nasal spray 1 spray (50 mcg total) by Each Nostril route daily as needed for Allergies.    furosemide (LASIX) 40 MG tablet Take 20 mg by mouth every other day.    gabapentin (NEURONTIN) 100 MG capsule Take 1 capsule (100 mg total) by mouth as needed (pain).    hydrALAZINE (APRESOLINE) 50 MG tablet TAKE 1 TABLET (50 MG TOTAL) BY MOUTH 3 (THREE) TIMES DAILY.    isosorbide mononitrate (IMDUR) 30 MG 24 hr tablet Take 2 tablets (60 mg total) by mouth once daily.    metoprolol succinate (TOPROL-XL) 25 MG 24 hr tablet Take 12.5 mg by mouth 2 (two) times daily.    MITIGARE 0.6 mg Cap TAKE 1 CAPSULE BY MOUTH TWICE A DAY AS NEEDED GOUT FLARE UP TO 3 DAYS    multivitamin (THERAGRAN) per tablet Take 1 tablet by mouth Daily.    nitroGLYCERIN (NITROSTAT) 0.4 MG SL tablet Place 1 tablet (0.4 mg total) under the tongue every 5 (five) minutes as needed for Chest pain.    omeprazole (PRILOSEC) 20 MG capsule Take 1 capsule (20 mg total) by mouth daily as needed (heartburn).    paricalcitoL (ZEMPLAR) 1 MCG capsule TAKE 1 CAPSULE ON MONDAY, WEDNESDAY AND FRIDAY    predniSONE (DELTASONE) 5 MG tablet TAKE 1 TABLET BY MOUTH EVERY DAY IN THE MORNING    tacrolimus (PROGRAF) 1 MG Cap Take 3 capsules (3 mg total) by mouth every 12 (twelve) hours.    atorvastatin (LIPITOR) 40 MG tablet Take 1 tablet (40 mg total) by mouth once daily Will start increase after discharge. Did not realize it was increased and has continued to take 10mg    NIFEdipine (PROCARDIA-XL) 60 MG (OSM) 24 hr tablet Take 1 tablet (60 mg total) by mouth 2 (two) times a day. (Patient not taking: Reported on 10/23/2023) On hold per MD. Waiting to be  instructed if should continue or stop    pulse oximeter (PULSE OXIMETER) device by Apply Externally route 2 (two) times a day. Use twice daily at 8 AM and 3 PM and record the value in [facility] as directed.               Caroline Hinds  [id number]                  .

## 2023-10-25 NOTE — CONSULTS
[facility] - Emergency Dept  Transplant Nephrology  Consult Note    Patient Name: Ibrahima Phelps  MRN: [id number]  Admission Date: 10/25/2023  Hospital Length of Stay: 0 days  Attending Provider: Artis Godoy MD   Primary Care Physician: Anatoliy Colindres MD  Principal Problem:SOB (shortness of breath)    Consults  Subjective:     HPI:  67 yr old AAM with hx of kidney transplant in 4/12/2005 (bcr of 2.8-3.2), COPD, CAD s/p CABG in 2006 with active smoking, AFIB on amiodarone/Eliquis, HTN who is admitted for chest pain as well as SOB. Of note, patient recently admitted (10/13/23 through 10/2023) for similar symptoms. Patient with persistent chest pain with PET stress on 9/13/23 concerning for   - showing small sized, mild intensity apical inferior and inferoseptal reversible perfusion abnormality involving 6% of LV myocardium indicative of focal coronary stenosis.  -CT attenuation images demonstrate severe diffuse coronary calcifications in the LAD, and moderate diffuse coronary calcifications in the LCX and RCA territory and mild diffuse aortic calcifications in the descending aorta.  Patient seen by cardiology for unstable angina with discussion regarding LHC. Patient however declined due to DOUGLAS and his desire to not be on HD. Patient also seen by palliative care for goals of care.     Patient states that he went home but began noticing SOB, DAUGHERTY and persistent chest pain when he would try to do anything. Had significant SOB and CP prompting ED visit. Patient currently denies any fever. Denies any cough but does admit to SOB or CP. CP relieved by nitro taken earlier today. Denies any nausea, vomiting, abdominal pain, diarrhea.     On admission noted to have O2 sat of 97% on room air with SBP initially of 120's. Labs notable for creatinine of 5.4 (same creatinine at the time of previous discharge), no leukocytosis with mildly elevated troponins with EKG showing sinus rhythm with resolution of T wave abnormalities  "in inferior and anterior leads.       Past Medical History:   Diagnosis Date    Atrial fibrillation     CAD (coronary artery disease) 2006    MI in 2006    CHF (congestive heart failure) 2017    CKD (chronic kidney disease) stage 3, GFR 30-59 ml/min     Dr. Latif.  in transplanted kidney    COVID-19 2/7/2023    Diverticulosis     Hyperlipidemia     Hypertension     Keloid cicatrix     Metabolic bone disease     Other emphysema 10/1/2019    Pericarditis     S/P kidney transplant 1992    x2 (1992 & 2005),    Thrombocytopenia     Thyroid disease     Tobacco use 09/05/2014       Past Surgical History:   Procedure Laterality Date    CARDIOVERSION  04/30/15    CHOLECYSTECTOMY      COLONOSCOPY  April 20, 2011    Diverticulosis, repeat recommended in 3 yrs., repeat colonoscopy 2014 revealed 2 polyps.  He should return in 5 years.    COLONOSCOPY N/A 3/13/2020    Procedure: COLONOSCOPY;  Surgeon: Chon Casper MD;  Location: [facility] (4TH FLR);  Service: Endoscopy;  Laterality: N/A;  ok to hold coumadin x5days-see telephone encounter 2/4/20-tb    COLONOSCOPY N/A 2/4/2021    Procedure: COLONOSCOPY;  Surgeon: Chon Casper MD;  Location: [facility] (4TH FLR);  Service: Endoscopy;  Laterality: N/A;  Eliquis - per Dr. GAVIN Blunt ok to hold x 2 days and "restarted asap"- ERW  last prep "poor", [id number] ordered/ Pt requests Dr. Casper  prep ins. mailed - COVID screening on 2/1/21 PCW- ERW    COLONOSCOPY N/A 3/3/2021    Procedure: COLONOSCOPY;  Surgeon: Chon Casper MD;  Location: [facility] (4TH FLR);  Service: Endoscopy;  Laterality: N/A;  Patient with his second poor bowel pre and has poor prep today.  If patient not intersted or can't get colonoscopy tomorrow will need constipation bowel prep and will need to restart his Eliquis today.Thanks,Chon Blunt-ok to hold Eliquis 2 days prior      COVID     CORONARY ANGIOPLASTY WITH STENT PLACEMENT  9/13/2006    IRRIGATION AND DEBRIDEMENT Right 8/30/2023    " Procedure: IRRIGATION AND DEBRIDEMENT, RIGHT MIDDLE FINGER;  Surgeon: Martin Larsen MD;  Location: [facility] FLR;  Service: Orthopedics;  Laterality: Right;    KIDNEY TRANSPLANT  2005    PARATHYROIDECTOMY      TREATMENT OF CARDIAC ARRHYTHMIA N/A 3/28/2022    Procedure: CARDIOVERSION;  Surgeon: Migue Blunt MD;  Location: [facility] EP LAB;  Service: Cardiology;  Laterality: N/A;  AF, DCC, MAC, GP, 3 PREP*RODRIGO deferred pt 100% compliant*       Review of patient's allergies indicates:   Allergen Reactions    Ace inhibitors Swelling    Verapamil Other (See Comments)     Other reaction(s): Unknown    Povidone-iodine Itching     Current Facility-Administered Medications   Medication Frequency    amiodarone tablet 200 mg Daily    apixaban tablet 5 mg BID    atorvastatin tablet 40 mg Daily    bisacodyL suppository 10 mg Daily PRN    clopidogreL tablet 75 mg Daily    dextrose 10% bolus 125 mL 125 mL PRN    dextrose 10% bolus 250 mL 250 mL PRN    doxazosin tablet 4 mg Q12H    famotidine tablet 20 mg BID    ferrous sulfate tablet 1 each Daily    glucagon (human recombinant) injection 1 mg PRN    glucose chewable tablet 16 g PRN    glucose chewable tablet 24 g PRN    hydrALAZINE injection 10 mg Q6H PRN    [START ON 10/26/2023] isosorbide mononitrate 24 hr tablet 60 mg Daily    melatonin tablet 6 mg Nightly PRN    metoprolol succinate (TOPROL-XL) 24 hr split tablet 12.5 mg BID    paricalcitoL capsule 1 mcg Every Mon, Wed, Fri    polyethylene glycol packet 17 g Daily PRN    predniSONE tablet 5 mg Daily    promethazine tablet 25 mg Q6H PRN    sodium chloride 0.9% flush 10 mL PRN    tacrolimus capsule 3 mg BID     Current Outpatient Medications   Medication    acetaminophen (TYLENOL) 500 MG tablet    albuterol (PROVENTIL) 2.5 mg /3 mL (0.083 %) nebulizer solution    albuterol (VENTOLIN HFA) 90 mcg/actuation inhaler    amiodarone (PACERONE) 200 MG Tab    ammonium lactate 12 % Crea    apixaban (ELIQUIS) 5 mg Tab    b  complex vitamins (B COMPLEX-VITAMIN B12) tablet    calcium carbonate (CALCIUM 600 ORAL)    CHOLECALCIFEROL, VITAMIN D3, ORAL    clopidogreL (PLAVIX) 75 mg tablet    doxazosin (CARDURA) 4 MG tablet    famotidine (PEPCID) 20 MG tablet    ferrous sulfate 325 (65 FE) MG EC tablet    fexofenadine HCl (ALLEGRA ORAL)    fluticasone propion-salmeterol 115-21 mcg/dose (ADVAIR HFA) 115-21 mcg/actuation HFAA inhaler    fluticasone propionate (FLONASE) 50 mcg/actuation nasal spray    furosemide (LASIX) 40 MG tablet    gabapentin (NEURONTIN) 100 MG capsule    hydrALAZINE (APRESOLINE) 50 MG tablet    isosorbide mononitrate (IMDUR) 30 MG 24 hr tablet    metoprolol succinate (TOPROL-XL) 25 MG 24 hr tablet    MITIGARE 0.6 mg Cap    multivitamin (THERAGRAN) per tablet    nitroGLYCERIN (NITROSTAT) 0.4 MG SL tablet    omeprazole (PRILOSEC) 20 MG capsule    paricalcitoL (ZEMPLAR) 1 MCG capsule    predniSONE (DELTASONE) 5 MG tablet    tacrolimus (PROGRAF) 1 MG Cap    atorvastatin (LIPITOR) 40 MG tablet    NIFEdipine (PROCARDIA-XL) 60 MG (OSM) 24 hr tablet    pulse oximeter (PULSE OXIMETER) device     Family History       Problem Relation (Age of Onset)    Heart disease Father    Kidney disease Sister, Brother    Kidney failure Sister, Brother    No Known Problems Sister, Brother, Sister, Sister    Thyroid disease Mother          Tobacco Use    Smoking status: Former     Current packs/day: 0.00     Average packs/day: 0.5 packs/day for 40.0 years (20.0 ttl pk-yrs)     Types: Cigarettes     Start date: 1982     Quit date: 2022     Years since quittin.6    Smokeless tobacco: Never    Tobacco comments:     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     Patient is currently retired   Substance and Sexual Activity    Alcohol use: No    Drug use: No    Sexual activity: Yes     Partners: Female     Review of Systems   Constitutional:  Negative for activity change, appetite change, chills and  fever.   HENT:  Negative for congestion, sneezing and sore throat.    Eyes:  Negative for pain and itching.   Respiratory:  Positive for shortness of breath. Negative for apnea, cough and wheezing.    Cardiovascular:  Negative for chest pain.   Gastrointestinal:  Negative for abdominal pain, constipation, diarrhea, nausea and vomiting.   Genitourinary:  Negative for difficulty urinating, dysuria and frequency.   Musculoskeletal:  Negative for back pain, joint swelling and neck pain.   Skin:  Negative for color change.   Neurological:  Negative for dizziness, light-headedness and headaches.   Psychiatric/Behavioral:  Negative for behavioral problems and confusion. The patient is not nervous/anxious.      Objective:     Vital Signs (Most Recent):  Temp: 98.2 °F (36.8 °C) (10/25/23 1135)  Pulse: 64 (10/25/23 1346)  Resp: 18 (10/25/23 1346)  BP: (!) 211/94 (10/25/23 1346)  SpO2: 98 % (10/25/23 1346) Vital Signs (24h Range):  Temp:  [97.8 °F (36.6 °C)-98.2 °F (36.8 °C)] 98.2 °F (36.8 °C)  Pulse:  [60-90] 64  Resp:  [16-22] 18  SpO2:  [96 %-100 %] 98 %  BP: (127-211)/(76-94) 211/94     Weight: 93 kg (205 lb) (10/25/23 0501)  Body mass index is 27.05 kg/m².  Body surface area is 2.19 meters squared.    No intake/output data recorded.    Physical Exam  Vitals reviewed.   Constitutional:       General: He is not in acute distress.     Appearance: Normal appearance. He is not ill-appearing or toxic-appearing.      Comments: Appears stated age   HENT:      Head: Normocephalic and atraumatic.      Right Ear: External ear normal.      Left Ear: External ear normal.      Nose: Nose normal.   Eyes:      General: No scleral icterus.     Conjunctiva/sclera: Conjunctivae normal.   Cardiovascular:      Rate and Rhythm: Normal rate and regular rhythm.      Pulses: Normal pulses.      Heart sounds: Normal heart sounds. No murmur heard.     No friction rub.   Pulmonary:      Effort: Pulmonary effort is normal. No respiratory distress.       Breath sounds: Normal breath sounds. No wheezing or rhonchi.      Comments: Coarse breath sounds noted   Abdominal:      General: Bowel sounds are normal. There is no distension.      Palpations: Abdomen is soft.      Tenderness: There is no abdominal tenderness. There is no guarding.   Musculoskeletal:         General: No swelling or deformity. Normal range of motion.      Cervical back: Normal range of motion and neck supple. No tenderness.      Right lower leg: No edema.      Left lower leg: No edema.   Skin:     General: Skin is warm and dry.      Coloration: Skin is not jaundiced.      Findings: No erythema or rash.   Neurological:      General: No focal deficit present.      Mental Status: He is alert and oriented to person, place, and time.      Motor: No weakness.   Psychiatric:         Mood and Affect: Mood normal.         Behavior: Behavior normal.         Assessment/Plan:   67 yr old AAM with hx of kidney transplant in 2005 (bcrof 2.8-3.2), COPD, CAD s/p CABG in 2006 with active smoking, AFIB on amiodarone/Eliquis, HTN who is admitted for shortness of breath as well as dyspnea on exertion     1) Unstable angina  2) Fluid overload  3) KATINA of kidney transplant  4) Immunosuppression  5) CKD stage V  6) CAD s/p CABG  7) COPD  8) AFIB on Eliquis  9) HTN     - continues to have stable but advanced CKD stage V. Of note, patient with same creatinine as his recent discharge creatinine. Given recent TTE finding of significant right sided pressure as well as symptomatic SOB, agree with lasix diuresis.  - will monitor graft function closely but discussed with patient and wife. Patient aware of the high likelihood of needing HD in the near future. Patient agreeable to do HD as well as left heart cath during this admission. Currently no acute need for HD. Will monitor graft function while patient is getting diuresed. Will need a TDC when decision made to initiate patient on HD.  - defer to cardiology regarding need  and timing for LHC.   - resume on prograf 3 mg BID  - cont on prednisone 5 mg daily  - stopped taking Cellcept roughly a year ago per Dr. Diaz/Dr. Webb     Will continue to follow. Please call if you have any questions.        Tasia Conroy DO  Nephrology  [facility] - Emergency Dept

## 2023-10-25 NOTE — CARE UPDATE
Spoke with Cardiology about diuresis recs. Recommend 120 mg IV lasix BID. Confirmed with Dr. Godoy and will order one time dose and discuss further diuresis plans pending KTM consult given current kidney function.    Jaja Mcknight PA-C  Hospital Medicine [facility]

## 2023-10-26 LAB
25(OH)D3+25(OH)D2 SERPL-MCNC: 54 NG/ML (ref 30–96)
ALBUMIN SERPL BCP-MCNC: 2.6 G/DL (ref 3.5–5.2)
ALP SERPL-CCNC: 58 U/L (ref 55–135)
ALT SERPL W/O P-5'-P-CCNC: 15 U/L (ref 10–44)
ANION GAP SERPL CALC-SCNC: 15 MMOL/L (ref 8–16)
AST SERPL-CCNC: 16 U/L (ref 10–40)
BASOPHILS # BLD AUTO: 0.02 K/UL (ref 0–0.2)
BASOPHILS NFR BLD: 0.3 % (ref 0–1.9)
BILIRUB SERPL-MCNC: 0.5 MG/DL (ref 0.1–1)
BUN SERPL-MCNC: 49 MG/DL (ref 8–23)
CALCIUM SERPL-MCNC: 7.5 MG/DL (ref 8.7–10.5)
CHLORIDE SERPL-SCNC: 110 MMOL/L (ref 95–110)
CO2 SERPL-SCNC: 19 MMOL/L (ref 23–29)
CREAT SERPL-MCNC: 5.4 MG/DL (ref 0.5–1.4)
DIFFERENTIAL METHOD: ABNORMAL
EOSINOPHIL # BLD AUTO: 0.4 K/UL (ref 0–0.5)
EOSINOPHIL NFR BLD: 6.6 % (ref 0–8)
ERYTHROCYTE [DISTWIDTH] IN BLOOD BY AUTOMATED COUNT: 18.9 % (ref 11.5–14.5)
EST. GFR  (NO RACE VARIABLE): 10.8 ML/MIN/1.73 M^2
GLUCOSE SERPL-MCNC: 79 MG/DL (ref 70–110)
HCT VFR BLD AUTO: 26.2 % (ref 40–54)
HGB BLD-MCNC: 8.2 G/DL (ref 14–18)
IMM GRANULOCYTES # BLD AUTO: 0.02 K/UL (ref 0–0.04)
IMM GRANULOCYTES NFR BLD AUTO: 0.3 % (ref 0–0.5)
LYMPHOCYTES # BLD AUTO: 1.3 K/UL (ref 1–4.8)
LYMPHOCYTES NFR BLD: 20.5 % (ref 18–48)
MAGNESIUM SERPL-MCNC: 2.7 MG/DL (ref 1.6–2.6)
MCH RBC QN AUTO: 24.4 PG (ref 27–31)
MCHC RBC AUTO-ENTMCNC: 31.3 G/DL (ref 32–36)
MCV RBC AUTO: 78 FL (ref 82–98)
MONOCYTES # BLD AUTO: 0.6 K/UL (ref 0.3–1)
MONOCYTES NFR BLD: 9.8 % (ref 4–15)
NEUTROPHILS # BLD AUTO: 4 K/UL (ref 1.8–7.7)
NEUTROPHILS NFR BLD: 62.5 % (ref 38–73)
NRBC BLD-RTO: 0 /100 WBC
PHOSPHATE SERPL-MCNC: 6.3 MG/DL (ref 2.7–4.5)
PLATELET # BLD AUTO: 191 K/UL (ref 150–450)
PMV BLD AUTO: 11.2 FL (ref 9.2–12.9)
POTASSIUM SERPL-SCNC: 3.6 MMOL/L (ref 3.5–5.1)
PROT SERPL-MCNC: 6 G/DL (ref 6–8.4)
PTH-INTACT SERPL-MCNC: 60.9 PG/ML (ref 9–77)
RBC # BLD AUTO: 3.36 M/UL (ref 4.6–6.2)
SODIUM SERPL-SCNC: 144 MMOL/L (ref 136–145)
WBC # BLD AUTO: 6.4 K/UL (ref 3.9–12.7)

## 2023-10-26 PROCEDURE — 84100 ASSAY OF PHOSPHORUS: CPT | Mod: NTX

## 2023-10-26 PROCEDURE — 99215 OFFICE O/P EST HI 40 MIN: CPT | Mod: GC,NTX,, | Performed by: INTERNAL MEDICINE

## 2023-10-26 PROCEDURE — 63600175 PHARM REV CODE 636 W HCPCS

## 2023-10-26 PROCEDURE — 80053 COMPREHEN METABOLIC PANEL: CPT | Mod: NTX

## 2023-10-26 PROCEDURE — 25000003 PHARM REV CODE 250: Mod: NTX | Performed by: NURSE PRACTITIONER

## 2023-10-26 PROCEDURE — 83735 ASSAY OF MAGNESIUM: CPT | Mod: NTX

## 2023-10-26 PROCEDURE — 63600175 PHARM REV CODE 636 W HCPCS: Mod: NTX | Performed by: HOSPITALIST

## 2023-10-26 PROCEDURE — 36415 COLL VENOUS BLD VENIPUNCTURE: CPT | Mod: NTX

## 2023-10-26 PROCEDURE — 96365 THER/PROPH/DIAG IV INF INIT: CPT | Mod: 59

## 2023-10-26 PROCEDURE — 25000003 PHARM REV CODE 250: Mod: NTX

## 2023-10-26 PROCEDURE — 25000003 PHARM REV CODE 250: Mod: NTX | Performed by: EMERGENCY MEDICINE

## 2023-10-26 PROCEDURE — 96376 TX/PRO/DX INJ SAME DRUG ADON: CPT

## 2023-10-26 PROCEDURE — 63600175 PHARM REV CODE 636 W HCPCS: Mod: NTX | Performed by: INTERNAL MEDICINE

## 2023-10-26 PROCEDURE — 25000003 PHARM REV CODE 250: Mod: NTX | Performed by: HOSPITALIST

## 2023-10-26 PROCEDURE — G0378 HOSPITAL OBSERVATION PER HR: HCPCS | Mod: NTX

## 2023-10-26 PROCEDURE — 20600001 HC STEP DOWN PRIVATE ROOM

## 2023-10-26 PROCEDURE — 82306 VITAMIN D 25 HYDROXY: CPT | Mod: NTX | Performed by: INTERNAL MEDICINE

## 2023-10-26 PROCEDURE — 63600150 PHARM REV CODE 636: Mod: NTX | Performed by: HOSPITALIST

## 2023-10-26 PROCEDURE — 83970 ASSAY OF PARATHORMONE: CPT | Mod: NTX | Performed by: INTERNAL MEDICINE

## 2023-10-26 PROCEDURE — 96366 THER/PROPH/DIAG IV INF ADDON: CPT

## 2023-10-26 PROCEDURE — 99233 PR SUBSEQUENT HOSPITAL CARE,LEVL III: ICD-10-PCS | Mod: NTX,,, | Performed by: INTERNAL MEDICINE

## 2023-10-26 PROCEDURE — 99233 SBSQ HOSP IP/OBS HIGH 50: CPT | Mod: NTX,,, | Performed by: INTERNAL MEDICINE

## 2023-10-26 PROCEDURE — 99215 PR OFFICE/OUTPT VISIT, EST, LEVL V, 40-54 MIN: ICD-10-PCS | Mod: GC,NTX,, | Performed by: INTERNAL MEDICINE

## 2023-10-26 PROCEDURE — 25000003 PHARM REV CODE 250: Performed by: INTERNAL MEDICINE

## 2023-10-26 PROCEDURE — 36415 COLL VENOUS BLD VENIPUNCTURE: CPT | Mod: NTX | Performed by: INTERNAL MEDICINE

## 2023-10-26 PROCEDURE — 85025 COMPLETE CBC W/AUTO DIFF WBC: CPT | Mod: NTX

## 2023-10-26 PROCEDURE — 96375 TX/PRO/DX INJ NEW DRUG ADDON: CPT

## 2023-10-26 RX ORDER — SEVELAMER CARBONATE 800 MG/1
800 TABLET, FILM COATED ORAL
Status: DISCONTINUED | OUTPATIENT
Start: 2023-10-26 | End: 2023-10-27 | Stop reason: HOSPADM

## 2023-10-26 RX ORDER — SODIUM BICARBONATE 650 MG/1
1300 TABLET ORAL 3 TIMES DAILY
Status: DISCONTINUED | OUTPATIENT
Start: 2023-10-26 | End: 2023-10-27 | Stop reason: HOSPADM

## 2023-10-26 RX ORDER — FAMOTIDINE 20 MG/1
20 TABLET, FILM COATED ORAL DAILY
Status: DISCONTINUED | OUTPATIENT
Start: 2023-10-27 | End: 2023-10-27 | Stop reason: HOSPADM

## 2023-10-26 RX ORDER — SEVELAMER CARBONATE FOR ORAL SUSPENSION 800 MG/1
0.8 POWDER, FOR SUSPENSION ORAL
Status: DISCONTINUED | OUTPATIENT
Start: 2023-10-26 | End: 2023-10-26

## 2023-10-26 RX ADMIN — TACROLIMUS 3 MG: 1 CAPSULE ORAL at 09:10

## 2023-10-26 RX ADMIN — SODIUM BICARBONATE 650 MG TABLET 1300 MG: at 09:10

## 2023-10-26 RX ADMIN — SODIUM CHLORIDE 20 MG/HR: 9 INJECTION, SOLUTION INTRAVENOUS at 11:10

## 2023-10-26 RX ADMIN — TACROLIMUS 3 MG: 1 CAPSULE ORAL at 05:10

## 2023-10-26 RX ADMIN — IRON SUCROSE 100 MG: 20 INJECTION, SOLUTION INTRAVENOUS at 01:10

## 2023-10-26 RX ADMIN — ATORVASTATIN CALCIUM 40 MG: 40 TABLET, FILM COATED ORAL at 09:10

## 2023-10-26 RX ADMIN — PREDNISONE 5 MG: 5 TABLET ORAL at 09:10

## 2023-10-26 RX ADMIN — SODIUM CHLORIDE 20 MG/HR: 9 INJECTION, SOLUTION INTRAVENOUS at 01:10

## 2023-10-26 RX ADMIN — SEVELAMER CARBONATE 800 MG: 800 TABLET, FILM COATED ORAL at 05:10

## 2023-10-26 RX ADMIN — FAMOTIDINE 20 MG: 20 TABLET ORAL at 09:10

## 2023-10-26 RX ADMIN — HYDRALAZINE HYDROCHLORIDE 50 MG: 50 TABLET ORAL at 05:10

## 2023-10-26 RX ADMIN — DOXAZOSIN 4 MG: 2 TABLET ORAL at 09:10

## 2023-10-26 RX ADMIN — HYDRALAZINE HYDROCHLORIDE 50 MG: 50 TABLET ORAL at 01:10

## 2023-10-26 RX ADMIN — AMIODARONE HYDROCHLORIDE 200 MG: 200 TABLET ORAL at 09:10

## 2023-10-26 RX ADMIN — METOPROLOL SUCCINATE 25 MG: 25 TABLET, EXTENDED RELEASE ORAL at 09:10

## 2023-10-26 RX ADMIN — METOPROLOL SUCCINATE 25 MG: 25 TABLET, EXTENDED RELEASE ORAL at 08:10

## 2023-10-26 RX ADMIN — HYDRALAZINE HYDROCHLORIDE 50 MG: 50 TABLET ORAL at 08:10

## 2023-10-26 RX ADMIN — SODIUM BICARBONATE 650 MG TABLET 1300 MG: at 03:10

## 2023-10-26 RX ADMIN — DOXAZOSIN 4 MG: 2 TABLET ORAL at 08:10

## 2023-10-26 RX ADMIN — FERROUS SULFATE TAB EC 325 MG (65 MG FE EQUIVALENT) 1 EACH: 325 (65 FE) TABLET DELAYED RESPONSE at 09:10

## 2023-10-26 RX ADMIN — APIXABAN 5 MG: 5 TABLET, FILM COATED ORAL at 09:10

## 2023-10-26 RX ADMIN — SEVELAMER CARBONATE 800 MG: 800 TABLET, FILM COATED ORAL at 01:10

## 2023-10-26 RX ADMIN — CLOPIDOGREL BISULFATE 75 MG: 75 TABLET ORAL at 09:10

## 2023-10-26 RX ADMIN — ISOSORBIDE MONONITRATE 60 MG: 60 TABLET, EXTENDED RELEASE ORAL at 09:10

## 2023-10-26 RX ADMIN — ACETAMINOPHEN 1000 MG: 500 TABLET ORAL at 10:10

## 2023-10-26 RX ADMIN — LABETALOL HYDROCHLORIDE 10 MG: 5 INJECTION, SOLUTION INTRAVENOUS at 06:10

## 2023-10-26 NOTE — PROGRESS NOTES
"[facility] - Tele[facility]  Progress Note    Patient Name: Ibrahima Phelps  MRN: [id number]  Patient Class: OP- Observation   Admission Date: 10/25/2023  Length of Stay: 0 days  Attending Physician: Darrel Xavier MD  Primary Care Provider: Anatoliy Colindres MD        Subjective:     Principal Problem:SOB (shortness of breath)        HPI:  Ibrahima Phelps is a 67 y.o. male with a hx of CHF, HTN, Afib, CKD, and kidney transplant presents to the ED for shortness of breath. Of note, patient admitted last week for similar presentation. States he has been experiencing increased shortness of breath since discharge. Feels as though "he cannot get enough air in." Has associated stomach/ chest cramps when he feels short of breath. Symptoms are worse on exertion. States he took his lasix yesterday and has been taking it PRN due to his kidney function. Denies weighing himself at home. States he is complaint with his medications and does not miss any doses. BP at home has been running with systolic 180s-190s and sometimes will drop to systolic 130s-140s. Denies home O2 use. Denies fever, chills, cough, congestion, chest pain, abdominal pain, constipation, diarrhea, lower extremity swelling and urinary symptoms. States he was having very little urine output last admission but is unsure if it is back to baseline.     ED: AF, Hypertensive. Hgb 9.3. Cr 5.4. Tn 0.039. . EKG showed sinus rhythm with PACs. CXR showed worsening bibasilar pulmonary opacities which may be related to infectious/inflammatory process versus atelectasis or pulmonary edema in the appropriate clinical setting. Possible small bilateral pleural effusions. Given amio, Eliquis, plavix, doxazosin, hydralazine, and imdur in the ED.          Overview/Hospital Course:  No notes on file    Interval History: Feels better, UOP - 5000, Cr is 5.4 - stable, continue with diuresis fornow.    Review of Systems   Constitutional:  Positive for activity " change. Negative for chills and fever.   Respiratory:  Positive for shortness of breath. Negative for chest tightness.    Cardiovascular:  Positive for chest pain (pleuritic, resolved). Negative for leg swelling.   Gastrointestinal:  Negative for abdominal pain, constipation, diarrhea, nausea and vomiting.   Genitourinary:  Negative for dysuria and frequency.   Neurological:  Negative for dizziness and weakness.     Objective:     Vital Signs (Most Recent):  Temp: 97.7 °F (36.5 °C) (10/26/23 0038)  Pulse: 65 (10/26/23 0837)  Resp: 15 (10/26/23 0837)  BP: 130/81 (10/26/23 0837)  SpO2: 95 % (10/26/23 0837) Vital Signs (24h Range):  Temp:  [97.7 °F (36.5 °C)-98.2 °F (36.8 °C)] 97.7 °F (36.5 °C)  Pulse:  [54-69] 65  Resp:  [15-24] 15  SpO2:  [95 %-100 %] 95 %  BP: (130-211)/() 130/81     Weight: 92 kg (202 lb 13.2 oz)  Body mass index is 26.76 kg/m².    Intake/Output Summary (Last 24 hours) at 10/26/2023 0857  Last data filed at 10/26/2023 0843  Gross per 24 hour   Intake 119.08 ml   Output 5150 ml   Net -5030.92 ml         Physical Exam  Vitals and nursing note reviewed.   Constitutional:       Appearance: He is well-developed.      Comments: Pleasant on exam, speaking in full sentences. On RA   Eyes:      Pupils: Pupils are equal, round, and reactive to light.   Cardiovascular:      Rate and Rhythm: Normal rate and regular rhythm.   Pulmonary:      Effort: Pulmonary effort is normal.      Breath sounds: Normal breath sounds.   Abdominal:      Palpations: Abdomen is soft.      Tenderness: There is no abdominal tenderness.   Musculoskeletal:         General: No tenderness.      Right lower leg: No edema.      Left lower leg: No edema.   Skin:     General: Skin is warm and dry.   Neurological:      Mental Status: He is alert and oriented to person, place, and time.   Psychiatric:         Behavior: Behavior normal.             Significant Labs: All pertinent labs within the past 24 hours have been reviewed.  BMP:    Recent Labs   Lab 10/26/23  0427   GLU 79      K 3.6      CO2 19*   BUN 49*   CREATININE 5.4*   CALCIUM 7.5*   MG 2.7*       Significant Imaging: I have reviewed all pertinent imaging results/findings within the past 24 hours.      Assessment/Plan:      * SOB (shortness of breath)  Patient presents with worsening SOB on exertion since discharge last week.  - AF, Hypertensive  - O2 100% on RA  - , Tn 0.039  - CXR showed worsening bibasilar pulmonary opacities which may be related to infectious/inflammatory process versus atelectasis or pulmonary edema in the appropriate clinical setting. Possible small bilateral pleural effusions  - will hold on diuresis until evaluated by consult services given complicate case  - Cardiology consulted for diuretic recs given kidney function  - walk test pending  Results for orders placed during the hospital encounter of 10/13/23    Echo    Interpretation Summary    Left Ventricle: The left ventricle is normal in size. Ventricular mass is normal. Normal wall thickness. Normal wall motion. There is normal systolic function with a visually estimated ejection fraction of 60 - 65%. Grade II diastolic dysfunction.    Left Atrium: Left atrium is severely dilated.    Right Ventricle: Normal right ventricular cavity size. Wall thickness is normal. Right ventricle wall motion  is normal. Systolic function is normal.    Right Atrium: Right atrium is dilated.    Aortic Valve: Moderately calcified cusps. There is moderate annular calcification present. Mildly restricted motion. There is mild stenosis. Aortic valve area by VTI is 1.85 cm². Aortic valve peak velocity is 2.12 m/s. Mean gradient is 9 mmHg. The dimensionless index is 0.49. There is mild to moderate aortic regurgitation.    Pulmonary Artery: The estimated pulmonary artery systolic pressure is 59 mmHg.    IVC/SVC: Normal venous pressure at 3 mmHg.      CKD (chronic kidney disease), stage IV  Creatine stable  for now. BMP reviewed- noted Estimated Creatinine Clearance: 14.8 mL/min (A) (based on SCr of 5.4 mg/dL (H)). according to latest data. Monitor UOP and serial BMP and adjust therapy as needed. Renally dose meds.  - Cr 5.4 (last known baseline ~4)  - continue to monitor daily CMP  - Nephrology consulted due to worsening kidney function  - avoid nephrotoxic medications    Anemia in stage 4 chronic kidney disease  - Hgb 9.3  - continue to monitor daily CBC  - transfuse Hgb < 7      Chronic obstructive pulmonary disease  - hx noted  - patient uses home inhalers PRN  - duo nebs PRN  - not on home O2  - keep O2 sats 88-92%    Paroxysmal atrial fibrillation  Patient with Paroxysmal (<7 days) atrial fibrillation which is controlled currently with Beta Blocker and Amiodarone. Patient is currently in sinus rhythm.[id number] Score: 2. Anticoagulation indicated. Anticoagulation done with Eliquis.  - continue tele    Hypertensive heart and kidney disease  Hypertensive  - continue home Imdur, metoprolol, hydralazine and doxazosin  - up titrate meds as needed  - continue to monitor vitals closely     H/O kidney transplant  - hx noted  - continue home prednisone and prograf  - prograf level pending  - KTM consulted, appreciate assistance     Mixed hyperlipidemia  - continue statin    CAD (coronary artery disease)  Patient with known CAD, which is controlled   - Will continue Plavix and Statin and monitor for S/Sx of angina/ACS.   - Continue to monitor on telemetry  - pt is currently chest pain free  - Tn 0.039, will continue to trend  - EKG w/o acute ischemic changes  - Cardiology consulted, appreciate recs  - STAT EKG and trop if chest pain occurs       VTE Risk Mitigation (From admission, onward)         Ordered     apixaban tablet 5 mg  2 times daily         10/25/23 0830                Discharge Planning   RAMU:      Code Status: Full Code   Is the patient medically ready for discharge?:     Reason for patient still in hospital  (select all that apply): Patient unstable  Discharge Plan A: Home                  Darrel Xavier MD  Department of Hospital Medicine   [facility] - Telemetry Stepdown

## 2023-10-26 NOTE — ED NOTES
Telemetry Verification   Patient placed on Telemetry Box  Verified with War Room  Box # [id number]   Monitor Tech Bela   Rate 58   Rhythm Sinus Aaron

## 2023-10-26 NOTE — PROGRESS NOTES
Pharmacist Renal Dose Adjustment Note    Ibrahima Phelps is a 67 y.o. male being treated with the medication famotidine    Patient Data:    Vital Signs (Most Recent):  Temp: 97.7 °F (36.5 °C) (10/26/23 0038)  Pulse: 65 (10/26/23 0942)  Resp: 15 (10/26/23 0837)  BP: 130/81 (10/26/23 0942)  SpO2: 95 % (10/26/23 0837) Vital Signs (72h Range):  Temp:  [97.7 °F (36.5 °C)-98.2 °F (36.8 °C)]   Pulse:  [54-90]   Resp:  [15-24]   BP: (127-211)/()   SpO2:  [95 %-100 %]      Recent Labs   Lab 10/20/23  0501 10/25/23  0523 10/26/23  0427   CREATININE 5.4* 5.4* 5.4*     Serum creatinine: 5.4 mg/dL (H) 10/26/23 0427  Estimated creatinine clearance: 14.8 mL/min (A)    Medication: famotidine 20 mg PO BID will be changed to famotidine 20 mg PO daily    Pharmacist's Name: Chery Neumann  Pharmacist's Extension: [phone]

## 2023-10-26 NOTE — NURSING
Nurses Note -- 4 Eyes      10/26/2023   5:50 AM      Skin assessed during: Admit      [x] No Altered Skin Integrity Present    []Prevention Measures Documented      [] Yes- Altered Skin Integrity Present or Discovered   [] LDA Added if Not in Epic (Describe Wound)   [] New Altered Skin Integrity was Present on Admit and Documented in LDA   [] Wound Image Taken    Wound Care Consulted? No    Attending Nurse:  Fatou Underwood RN/Staff Member:   RUSH Keyes

## 2023-10-26 NOTE — PLAN OF CARE
Problem: Adult Inpatient Plan of Care  Goal: Plan of Care Review  Outcome: Ongoing, Progressing  Goal: Patient-Specific Goal (Individualized)  Outcome: Ongoing, Progressing  Goal: Absence of Hospital-Acquired Illness or Injury  Outcome: Ongoing, Progressing  Goal: Optimal Comfort and Wellbeing  Outcome: Ongoing, Progressing  Goal: Readiness for Transition of Care  Outcome: Ongoing, Progressing     Problem: Infection  Goal: Absence of Infection Signs and Symptoms  Outcome: Ongoing, Progressing     Problem: Fluid and Electrolyte Imbalance (Acute Kidney Injury/Impairment)  Goal: Fluid and Electrolyte Balance  Outcome: Ongoing, Progressing     Problem: Oral Intake Inadequate (Acute Kidney Injury/Impairment)  Goal: Optimal Nutrition Intake  Outcome: Ongoing, Progressing     Problem: Renal Function Impairment (Acute Kidney Injury/Impairment)  Goal: Effective Renal Function  Outcome: Ongoing, Progressing    Pt A&Ox4. Pt has been hypertensive throughout night he has received Scheduled and PRN bp medications. Lasix gtt infusing at 10ml/hr. Pt has no complaints at this time. Good UOP

## 2023-10-26 NOTE — SUBJECTIVE & OBJECTIVE
Interval History: Feels better, UOP - 5000, Cr is 5.4 - stable, continue with diuresis fornow.    Review of Systems   Constitutional:  Positive for activity change. Negative for chills and fever.   Respiratory:  Positive for shortness of breath. Negative for chest tightness.    Cardiovascular:  Positive for chest pain (pleuritic, resolved). Negative for leg swelling.   Gastrointestinal:  Negative for abdominal pain, constipation, diarrhea, nausea and vomiting.   Genitourinary:  Negative for dysuria and frequency.   Neurological:  Negative for dizziness and weakness.     Objective:     Vital Signs (Most Recent):  Temp: 97.7 °F (36.5 °C) (10/26/23 0038)  Pulse: 65 (10/26/23 0837)  Resp: 15 (10/26/23 0837)  BP: 130/81 (10/26/23 0837)  SpO2: 95 % (10/26/23 0837) Vital Signs (24h Range):  Temp:  [97.7 °F (36.5 °C)-98.2 °F (36.8 °C)] 97.7 °F (36.5 °C)  Pulse:  [54-69] 65  Resp:  [15-24] 15  SpO2:  [95 %-100 %] 95 %  BP: (130-211)/() 130/81     Weight: 92 kg (202 lb 13.2 oz)  Body mass index is 26.76 kg/m².    Intake/Output Summary (Last 24 hours) at 10/26/2023 0857  Last data filed at 10/26/2023 0843  Gross per 24 hour   Intake 119.08 ml   Output 5150 ml   Net -5030.92 ml         Physical Exam  Vitals and nursing note reviewed.   Constitutional:       Appearance: He is well-developed.      Comments: Pleasant on exam, speaking in full sentences. On RA   Eyes:      Pupils: Pupils are equal, round, and reactive to light.   Cardiovascular:      Rate and Rhythm: Normal rate and regular rhythm.   Pulmonary:      Effort: Pulmonary effort is normal.      Breath sounds: Normal breath sounds.   Abdominal:      Palpations: Abdomen is soft.      Tenderness: There is no abdominal tenderness.   Musculoskeletal:         General: No tenderness.      Right lower leg: No edema.      Left lower leg: No edema.   Skin:     General: Skin is warm and dry.   Neurological:      Mental Status: He is alert and oriented to person, place, and  time.   Psychiatric:         Behavior: Behavior normal.             Significant Labs: All pertinent labs within the past 24 hours have been reviewed.  BMP:   Recent Labs   Lab 10/26/23  0427   GLU 79      K 3.6      CO2 19*   BUN 49*   CREATININE 5.4*   CALCIUM 7.5*   MG 2.7*       Significant Imaging: I have reviewed all pertinent imaging results/findings within the past 24 hours.

## 2023-10-26 NOTE — NURSING
Pt arrived via stretcher. A&Ox4 no complaints. Lasix infusing at 10/ml per hour.Wife at bedside. Pt ambulates steady gait from stretcher to bed

## 2023-10-26 NOTE — PROGRESS NOTES
[facility] - Emergency Dept  Transplant Nephrology  Progress Note    Patient Name: Ibrahima Phelps  MRN: [id number]  Admission Date: 10/25/2023  Hospital Length of Stay: 0 days  Attending Provider: Darrel Xavier MD   Primary Care Physician: Anatoliy Colindres MD  Principal Problem:SOB (shortness of breath)    Consults  Subjective:     HPI:  67 yr old AAM with hx of kidney transplant in 4/12/2005 (bcr of 2.8-3.2), COPD, CAD s/p CABG in 2006 with active smoking, AFIB on amiodarone/Eliquis, HTN who is admitted for chest pain as well as SOB. Of note, patient recently admitted (10/13/23 through 10/2023) for similar symptoms. Patient with persistent chest pain with PET stress on 9/13/23 concerning for   - showing small sized, mild intensity apical inferior and inferoseptal reversible perfusion abnormality involving 6% of LV myocardium indicative of focal coronary stenosis.  -CT attenuation images demonstrate severe diffuse coronary calcifications in the LAD, and moderate diffuse coronary calcifications in the LCX and RCA territory and mild diffuse aortic calcifications in the descending aorta.  Patient seen by cardiology for unstable angina with discussion regarding LHC. Patient however declined due to DOUGLAS and his desire to not be on HD. Patient also seen by palliative care for goals of care.     Patient states that he went home but began noticing SOB, DAUGHERTY and persistent chest pain when he would try to do anything. Had significant SOB and CP prompting ED visit. Patient currently denies any fever. Denies any cough but does admit to SOB or CP. CP relieved by nitro taken earlier today. Denies any nausea, vomiting, abdominal pain, diarrhea.     On admission noted to have O2 sat of 97% on room air with SBP initially of 120's. Labs notable for creatinine of 5.4 (same creatinine at the time of previous discharge), no leukocytosis with mildly elevated troponins with EKG showing sinus rhythm with resolution of T wave abnormalities in  "inferior and anterior leads.     Overnight: He reports no change in the way he feels, but states his CP has resolved. Good UOP ~4.5 liters, eating well.      Past Medical History:   Diagnosis Date    Atrial fibrillation     CAD (coronary artery disease) 2006    MI in 2006    CHF (congestive heart failure) 2017    CKD (chronic kidney disease) stage 3, GFR 30-59 ml/min     Dr. Latif.  in transplanted kidney    COVID-19 2/7/2023    Diverticulosis     Hyperlipidemia     Hypertension     Keloid cicatrix     Metabolic bone disease     Other emphysema 10/1/2019    Pericarditis     S/P kidney transplant 1992    x2 (1992 & 2005),    Thrombocytopenia     Thyroid disease     Tobacco use 09/05/2014       Past Surgical History:   Procedure Laterality Date    CARDIOVERSION  04/30/15    CHOLECYSTECTOMY      COLONOSCOPY  April 20, 2011    Diverticulosis, repeat recommended in 3 yrs., repeat colonoscopy 2014 revealed 2 polyps.  He should return in 5 years.    COLONOSCOPY N/A 3/13/2020    Procedure: COLONOSCOPY;  Surgeon: Chon Casper MD;  Location: [facility] (4TH FLR);  Service: Endoscopy;  Laterality: N/A;  ok to hold coumadin x5days-see telephone encounter 2/4/20-tb    COLONOSCOPY N/A 2/4/2021    Procedure: COLONOSCOPY;  Surgeon: Chon Casper MD;  Location: [facility] (4TH FLR);  Service: Endoscopy;  Laterality: N/A;  Eliquis - per Dr. GAVIN chua to hold x 2 days and "restarted asap"- ERW  last prep "poor", [id number] ordered/ Pt requests Dr. Casper  prep ins. mailed - COVID screening on 2/1/21 PCW- ERW    COLONOSCOPY N/A 3/3/2021    Procedure: COLONOSCOPY;  Surgeon: Chon Casper MD;  Location: [facility] (4TH FLR);  Service: Endoscopy;  Laterality: N/A;  Patient with his second poor bowel pre and has poor prep today.  If patient not intersted or can't get colonoscopy tomorrow will need constipation bowel prep and will need to restart his Eliquis today.Thanks,Chon Blunt-ok to hold Eliquis 2 days prior    "   COVID     CORONARY ANGIOPLASTY WITH STENT PLACEMENT  9/13/2006    IRRIGATION AND DEBRIDEMENT Right 8/30/2023    Procedure: IRRIGATION AND DEBRIDEMENT, RIGHT MIDDLE FINGER;  Surgeon: Martin Larsen MD;  [facility];  Service: Orthopedics;  Laterality: Right;    KIDNEY TRANSPLANT  2005    PARATHYROIDECTOMY      TREATMENT OF CARDIAC ARRHYTHMIA N/A 3/28/2022    Procedure: CARDIOVERSION;  Surgeon: Migue Blunt MD;  Location: [facility] EP LAB;  Service: Cardiology;  Laterality: N/A;  AF, DCC, MAC, GP, 3 PREP*RODRIGO deferred pt 100% compliant*       Review of patient's allergies indicates:   Allergen Reactions    Ace inhibitors Swelling    Verapamil Other (See Comments)     Other reaction(s): Unknown    Povidone-iodine Itching     Current Facility-Administered Medications   Medication Frequency    acetaminophen tablet 1,000 mg Q8H PRN    amiodarone tablet 200 mg Daily    apixaban tablet 5 mg BID    atorvastatin tablet 40 mg Daily    bisacodyL suppository 10 mg Daily PRN    clopidogreL tablet 75 mg Daily    dextrose 10% bolus 125 mL 125 mL PRN    dextrose 10% bolus 250 mL 250 mL PRN    doxazosin tablet 4 mg Q12H    famotidine tablet 20 mg BID    ferrous sulfate tablet 1 each Daily    furosemide (LASIX) 200 mg in sodium chloride 0.9% SolP 100 mL continuous infusion (conc: 2 mg/mL) Continuous    glucagon (human recombinant) injection 1 mg PRN    glucose chewable tablet 16 g PRN    glucose chewable tablet 24 g PRN    hydrALAZINE injection 10 mg Q6H PRN    hydrALAZINE tablet 50 mg Q8H    isosorbide mononitrate 24 hr tablet 60 mg Daily    labetalol 20 mg/4 mL (5 mg/mL) IV syring Q6H PRN    melatonin tablet 6 mg Nightly PRN    metoprolol succinate (TOPROL-XL) 24 hr tablet 25 mg BID    metoprolol tartrate (LOPRESSOR) split tablet 12.5 mg Once    paricalcitoL capsule 1 mcg Every Mon, Wed, Fri    polyethylene glycol packet 17 g Daily PRN    predniSONE tablet 5 mg Daily    promethazine tablet 25 mg Q6H PRN     sodium chloride 0.9% flush 10 mL PRN    tacrolimus capsule 3 mg BID     Family History       Problem Relation (Age of Onset)    Heart disease Father    Kidney disease Sister, Brother    Kidney failure Sister, Brother    No Known Problems Sister, Brother, Sister, Sister    Thyroid disease Mother          Tobacco Use    Smoking status: Former     Current packs/day: 0.00     Average packs/day: 0.5 packs/day for 40.0 years (20.0 ttl pk-yrs)     Types: Cigarettes     Start date: 1982     Quit date: 2022     Years since quittin.6    Smokeless tobacco: Never    Tobacco comments:     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     Patient is currently retired   Substance and Sexual Activity    Alcohol use: No    Drug use: No    Sexual activity: Yes     Partners: Female     Review of Systems   Constitutional:  Negative for activity change, appetite change, chills and fever.   HENT:  Negative for congestion, sneezing and sore throat.    Eyes:  Negative for pain and itching.   Respiratory:  Positive for shortness of breath. Negative for apnea, cough and wheezing.    Cardiovascular:  Negative for chest pain.   Gastrointestinal:  Negative for abdominal pain, constipation, diarrhea, nausea and vomiting.   Genitourinary:  Negative for difficulty urinating, dysuria and frequency.   Musculoskeletal:  Negative for back pain, joint swelling and neck pain.   Skin:  Negative for color change.   Neurological:  Negative for dizziness, light-headedness and headaches.   Psychiatric/Behavioral:  Negative for behavioral problems and confusion. The patient is not nervous/anxious.      Objective:     Vital Signs (Most Recent):  Temp: 97.7 °F (36.5 °C) (10/26/23 0038)  Pulse: 61 (10/26/23 0537)  Resp: 16 (10/26/23 0537)  BP: (!) 183/81 (10/26/23 0537)  SpO2: 100 % (10/26/23 0537) Vital Signs (24h Range):  Temp:  [97.7 °F (36.5 °C)-98.2 °F (36.8 °C)] 97.7 °F (36.5 °C)  Pulse:  [54-69] 61  Resp:   [15-24] 16  SpO2:  [96 %-100 %] 100 %  BP: (179-211)/() 183/81     Weight: 92 kg (202 lb 13.2 oz) (10/26/23 0400)  Body mass index is 26.76 kg/m².  Body surface area is 2.18 meters squared.    I/O last 3 completed shifts:  In: 119.1 [I.V.:19.1; IV Piggyback:100]  Out: 4450 [Urine:4450]    Physical Exam  Vitals reviewed.   Constitutional:       General: He is not in acute distress.     Appearance: Normal appearance. He is not ill-appearing or toxic-appearing.      Comments: Appears stated age   HENT:      Head: Normocephalic and atraumatic.      Right Ear: External ear normal.      Left Ear: External ear normal.      Nose: Nose normal.   Eyes:      General: No scleral icterus.     Conjunctiva/sclera: Conjunctivae normal.   Cardiovascular:      Rate and Rhythm: Normal rate and regular rhythm.      Pulses: Normal pulses.      Heart sounds: Normal heart sounds. No murmur heard.     No friction rub.   Pulmonary:      Effort: Pulmonary effort is normal. No respiratory distress.      Breath sounds: Normal breath sounds. No wheezing or rhonchi.      Comments: Coarse breath sounds noted   Abdominal:      General: Bowel sounds are normal. There is no distension.      Palpations: Abdomen is soft.      Tenderness: There is no abdominal tenderness. There is no guarding.   Musculoskeletal:         General: No swelling or deformity. Normal range of motion.      Cervical back: Normal range of motion and neck supple. No tenderness.      Right lower leg: No edema.      Left lower leg: No edema.   Skin:     General: Skin is warm and dry.      Coloration: Skin is not jaundiced.      Findings: No erythema or rash.   Neurological:      General: No focal deficit present.      Mental Status: He is alert and oriented to person, place, and time.      Motor: No weakness.   Psychiatric:         Mood and Affect: Mood normal.         Behavior: Behavior normal.         Assessment/Plan:     67 yr old AAM with hx of kidney transplant in 2005  (bcrof 2.8-3.2), COPD, CAD s/p CABG in 2006 with active smoking, AFIB on Amiodarone and Eliquis, poorly Hypertension who is admitted for shortness of breath at rest and dyspnea on exertion     1) Unstable angina  Improved after diuresis, continue current regimen, Cardiology to manage and we will defer to them for timing of LHC and any intervention necessary    2) Hypervolemia  Multifactorial in the setting of CKD 5 and what is likely cardiorenal physiology as well, good UOP ~4.5 liters with Furosemide 200 mg IV yesterday  - Would recommend changing the dose of Furosemide to 160-200 mg IV twice daily after infusion completed    3) KATINA of kidney transplant  Most consistent with cardiorenal physiology, we would expect improvement with diuresis, continues to have stable but advanced CKD stage G5A3, creatinine is unchanged from discharge last week     4) Immunosuppression  - Continue prograf 3 mg BID  - Continue Prednisone 5 mg daily  - Discontinued Cellcept roughly a year ago at the direction of Dr. Diaz/Dr. Webb  - Monitor daily for toxicity and drug/drug interactions    5) CKD stage G5A3  Patient has been refusing dialysis in the recent past, but is now agreeable to do HD as well as left heart cath during this admission.  - There is no acute need for dialysis at this time, we will perform the baseline evaluation for CKD G5A3 co-morbidities today and make specific medical recommendations understanding that if he becomes difficult to medically manage dialysis will be necessary  - Obtain iron studies, Vitamin D, iPTH if not recently measured    6) HTN  Not at goal of ~140/90 while in the hospital, I would expect some modest improvement with diuresis and correcting hypervolemia  - continue current home regimen, Doxazosin, Furosemide 200 mg twice daily, Hydralazine, Metoprolol, if not at goal consider Nifedipine    7) CAD s/p CABG  - We would defer to cardiology regarding need and timing for LHC and any intervention  that would be necessary    8) COPD  Clinically stable, appears asymptomatic, rate well controlled, continue routine home regimen, managed by primary team    9) AFIB on Eliquis   Rate well controlled with current regimen, managed by cardiology during this admission  - Current regimen consists of Amiodarone and Metoprolol    10) Acid base and Electrolytes  NAGMA Most consistent with CKD 5  - Sodium Bicarbonate 1300 mg three times daily  Hypomagnesemia Recommend IV replacement, routine monitoring, potassium low normal   Hyperphosphatemia Recommend Sevelemer 800 mg with mealsphosphorous binder, goal less than 5, has had this drug in the past without side effects    11) Anemia of CKD and Iron Deficiency  Recommend IV iron 100 mg three times weekly for 1 gram total dose, follow iron stores regularly  - EPO 10K units weekly       We will continue to follow. Please call if you have any questions.        Jose Warren, DO  [facility]

## 2023-10-27 VITALS
BODY MASS INDEX: 26.88 KG/M2 | OXYGEN SATURATION: 100 % | TEMPERATURE: 98 F | WEIGHT: 202.81 LBS | HEART RATE: 59 BPM | RESPIRATION RATE: 18 BRPM | SYSTOLIC BLOOD PRESSURE: 172 MMHG | HEIGHT: 73 IN | DIASTOLIC BLOOD PRESSURE: 88 MMHG

## 2023-10-27 LAB
ALBUMIN SERPL BCP-MCNC: 2.7 G/DL (ref 3.5–5.2)
ALP SERPL-CCNC: 59 U/L (ref 55–135)
ALT SERPL W/O P-5'-P-CCNC: 17 U/L (ref 10–44)
ANION GAP SERPL CALC-SCNC: 15 MMOL/L (ref 8–16)
AST SERPL-CCNC: 21 U/L (ref 10–40)
BASOPHILS # BLD AUTO: 0.02 K/UL (ref 0–0.2)
BASOPHILS NFR BLD: 0.3 % (ref 0–1.9)
BILIRUB SERPL-MCNC: 0.4 MG/DL (ref 0.1–1)
BUN SERPL-MCNC: 48 MG/DL (ref 8–23)
CALCIUM SERPL-MCNC: 7.6 MG/DL (ref 8.7–10.5)
CHLORIDE SERPL-SCNC: 105 MMOL/L (ref 95–110)
CO2 SERPL-SCNC: 22 MMOL/L (ref 23–29)
CREAT SERPL-MCNC: 5.8 MG/DL (ref 0.5–1.4)
DIFFERENTIAL METHOD: ABNORMAL
EOSINOPHIL # BLD AUTO: 0.4 K/UL (ref 0–0.5)
EOSINOPHIL NFR BLD: 6 % (ref 0–8)
ERYTHROCYTE [DISTWIDTH] IN BLOOD BY AUTOMATED COUNT: 18.7 % (ref 11.5–14.5)
EST. GFR  (NO RACE VARIABLE): 9.9 ML/MIN/1.73 M^2
GLUCOSE SERPL-MCNC: 66 MG/DL (ref 70–110)
HCT VFR BLD AUTO: 27.2 % (ref 40–54)
HGB BLD-MCNC: 8.5 G/DL (ref 14–18)
IMM GRANULOCYTES # BLD AUTO: 0.02 K/UL (ref 0–0.04)
IMM GRANULOCYTES NFR BLD AUTO: 0.3 % (ref 0–0.5)
LYMPHOCYTES # BLD AUTO: 1.5 K/UL (ref 1–4.8)
LYMPHOCYTES NFR BLD: 23.1 % (ref 18–48)
MAGNESIUM SERPL-MCNC: 2.6 MG/DL (ref 1.6–2.6)
MCH RBC QN AUTO: 24.6 PG (ref 27–31)
MCHC RBC AUTO-ENTMCNC: 31.3 G/DL (ref 32–36)
MCV RBC AUTO: 79 FL (ref 82–98)
MONOCYTES # BLD AUTO: 0.7 K/UL (ref 0.3–1)
MONOCYTES NFR BLD: 9.9 % (ref 4–15)
NEUTROPHILS # BLD AUTO: 4 K/UL (ref 1.8–7.7)
NEUTROPHILS NFR BLD: 60.4 % (ref 38–73)
NRBC BLD-RTO: 0 /100 WBC
PHOSPHATE SERPL-MCNC: 6.6 MG/DL (ref 2.7–4.5)
PLATELET # BLD AUTO: 185 K/UL (ref 150–450)
PMV BLD AUTO: ABNORMAL FL (ref 9.2–12.9)
POTASSIUM SERPL-SCNC: 4 MMOL/L (ref 3.5–5.1)
PROT SERPL-MCNC: 6.5 G/DL (ref 6–8.4)
PTH-INTACT SERPL-MCNC: 73.6 PG/ML (ref 9–77)
RBC # BLD AUTO: 3.46 M/UL (ref 4.6–6.2)
SODIUM SERPL-SCNC: 142 MMOL/L (ref 136–145)
TACROLIMUS BLD-MCNC: 4.8 NG/ML (ref 5–15)
WBC # BLD AUTO: 6.66 K/UL (ref 3.9–12.7)

## 2023-10-27 PROCEDURE — 82652 VIT D 1 25-DIHYDROXY: CPT | Mod: NTX | Performed by: INTERNAL MEDICINE

## 2023-10-27 PROCEDURE — 25000003 PHARM REV CODE 250: Performed by: INTERNAL MEDICINE

## 2023-10-27 PROCEDURE — 85025 COMPLETE CBC W/AUTO DIFF WBC: CPT | Mod: NTX

## 2023-10-27 PROCEDURE — 25000003 PHARM REV CODE 250

## 2023-10-27 PROCEDURE — 84100 ASSAY OF PHOSPHORUS: CPT | Mod: NTX

## 2023-10-27 PROCEDURE — 25000003 PHARM REV CODE 250: Mod: NTX | Performed by: NURSE PRACTITIONER

## 2023-10-27 PROCEDURE — 99232 SBSQ HOSP IP/OBS MODERATE 35: CPT | Mod: GC,NTX,, | Performed by: INTERNAL MEDICINE

## 2023-10-27 PROCEDURE — 25000003 PHARM REV CODE 250: Mod: NTX

## 2023-10-27 PROCEDURE — G0378 HOSPITAL OBSERVATION PER HR: HCPCS | Mod: NTX

## 2023-10-27 PROCEDURE — 80197 ASSAY OF TACROLIMUS: CPT | Mod: NTX | Performed by: INTERNAL MEDICINE

## 2023-10-27 PROCEDURE — 63600175 PHARM REV CODE 636 W HCPCS: Mod: JZ,EC,JG,NTX | Performed by: INTERNAL MEDICINE

## 2023-10-27 PROCEDURE — 80053 COMPREHEN METABOLIC PANEL: CPT | Mod: NTX

## 2023-10-27 PROCEDURE — 83970 ASSAY OF PARATHORMONE: CPT | Mod: NTX | Performed by: INTERNAL MEDICINE

## 2023-10-27 PROCEDURE — 25000003 PHARM REV CODE 250: Mod: NTX | Performed by: INTERNAL MEDICINE

## 2023-10-27 PROCEDURE — 96366 THER/PROPH/DIAG IV INF ADDON: CPT

## 2023-10-27 PROCEDURE — 99232 PR SUBSEQUENT HOSPITAL CARE,LEVL II: ICD-10-PCS | Mod: GC,NTX,, | Performed by: INTERNAL MEDICINE

## 2023-10-27 PROCEDURE — 83735 ASSAY OF MAGNESIUM: CPT | Mod: NTX

## 2023-10-27 PROCEDURE — 63600175 PHARM REV CODE 636 W HCPCS: Mod: NTX

## 2023-10-27 PROCEDURE — 99232 PR SUBSEQUENT HOSPITAL CARE,LEVL II: ICD-10-PCS | Mod: NTX,,, | Performed by: INTERNAL MEDICINE

## 2023-10-27 PROCEDURE — 25000003 PHARM REV CODE 250: Mod: NTX | Performed by: EMERGENCY MEDICINE

## 2023-10-27 PROCEDURE — 94761 N-INVAS EAR/PLS OXIMETRY MLT: CPT | Mod: NTX

## 2023-10-27 PROCEDURE — 20600001 HC STEP DOWN PRIVATE ROOM

## 2023-10-27 PROCEDURE — 99232 SBSQ HOSP IP/OBS MODERATE 35: CPT | Mod: NTX,,, | Performed by: INTERNAL MEDICINE

## 2023-10-27 RX ORDER — FUROSEMIDE 40 MG/1
80 TABLET ORAL 2 TIMES DAILY
Start: 2023-10-27 | End: 2023-11-01 | Stop reason: SDUPTHER

## 2023-10-27 RX ORDER — FUROSEMIDE 80 MG/1
80 TABLET ORAL 2 TIMES DAILY
Status: DISCONTINUED | OUTPATIENT
Start: 2023-10-27 | End: 2023-10-27 | Stop reason: HOSPADM

## 2023-10-27 RX ADMIN — SODIUM BICARBONATE 650 MG TABLET 1300 MG: at 09:10

## 2023-10-27 RX ADMIN — FERROUS SULFATE TAB EC 325 MG (65 MG FE EQUIVALENT) 1 EACH: 325 (65 FE) TABLET DELAYED RESPONSE at 09:10

## 2023-10-27 RX ADMIN — AMIODARONE HYDROCHLORIDE 200 MG: 200 TABLET ORAL at 09:10

## 2023-10-27 RX ADMIN — CLOPIDOGREL BISULFATE 75 MG: 75 TABLET ORAL at 09:10

## 2023-10-27 RX ADMIN — ATORVASTATIN CALCIUM 40 MG: 40 TABLET, FILM COATED ORAL at 09:10

## 2023-10-27 RX ADMIN — HYDRALAZINE HYDROCHLORIDE 50 MG: 50 TABLET ORAL at 03:10

## 2023-10-27 RX ADMIN — PREDNISONE 5 MG: 5 TABLET ORAL at 09:10

## 2023-10-27 RX ADMIN — PROMETHAZINE HYDROCHLORIDE 25 MG: 25 TABLET ORAL at 09:10

## 2023-10-27 RX ADMIN — SEVELAMER CARBONATE 800 MG: 800 TABLET, FILM COATED ORAL at 03:10

## 2023-10-27 RX ADMIN — METOPROLOL SUCCINATE 25 MG: 25 TABLET, EXTENDED RELEASE ORAL at 09:10

## 2023-10-27 RX ADMIN — TACROLIMUS 3 MG: 1 CAPSULE ORAL at 09:10

## 2023-10-27 RX ADMIN — FAMOTIDINE 20 MG: 20 TABLET ORAL at 09:10

## 2023-10-27 RX ADMIN — ISOSORBIDE MONONITRATE 60 MG: 60 TABLET, EXTENDED RELEASE ORAL at 09:10

## 2023-10-27 RX ADMIN — SODIUM BICARBONATE 650 MG TABLET 1300 MG: at 03:10

## 2023-10-27 RX ADMIN — APIXABAN 5 MG: 5 TABLET, FILM COATED ORAL at 09:10

## 2023-10-27 RX ADMIN — PARICALCITOL 1 MCG: 1 CAPSULE, LIQUID FILLED ORAL at 09:10

## 2023-10-27 RX ADMIN — EPOETIN ALFA-EPBX 10000 UNITS: 10000 INJECTION, SOLUTION INTRAVENOUS; SUBCUTANEOUS at 03:10

## 2023-10-27 RX ADMIN — ACETAMINOPHEN 1000 MG: 500 TABLET ORAL at 09:10

## 2023-10-27 RX ADMIN — DOXAZOSIN 4 MG: 2 TABLET ORAL at 09:10

## 2023-10-27 RX ADMIN — SEVELAMER CARBONATE 800 MG: 800 TABLET, FILM COATED ORAL at 06:10

## 2023-10-27 RX ADMIN — HYDRALAZINE HYDROCHLORIDE 50 MG: 50 TABLET ORAL at 06:10

## 2023-10-27 NOTE — PLAN OF CARE
Problem: Adult Inpatient Plan of Care  Goal: Plan of Care Review  10/27/2023 1453 by Usha Reilly LPN  Outcome: Met  10/27/2023 1452 by Usha Reilly LPN  Outcome: Ongoing, Progressing  Goal: Patient-Specific Goal (Individualized)  10/27/2023 1453 by Usha Reilly LPN  Outcome: Met  10/27/2023 1452 by Usha Reilly LPN  Outcome: Ongoing, Progressing  Goal: Absence of Hospital-Acquired Illness or Injury  10/27/2023 1453 by Usha Reilly LPN  Outcome: Met  10/27/2023 1452 by Usha Reilly LPN  Outcome: Ongoing, Progressing  Goal: Optimal Comfort and Wellbeing  10/27/2023 1453 by Usha Reilly LPN  Outcome: Met  10/27/2023 1452 by Usha Reilly LPN  Outcome: Ongoing, Progressing  Goal: Readiness for Transition of Care  10/27/2023 1453 by Usha Reilly LPN  Outcome: Met  10/27/2023 1452 by Usha Reilly LPN  Outcome: Ongoing, Progressing     Problem: Infection  Goal: Absence of Infection Signs and Symptoms  10/27/2023 1453 by Usha Reilly LPN  Outcome: Met  10/27/2023 1452 by Usha Reilly LPN  Outcome: Ongoing, Progressing     Problem: Fluid and Electrolyte Imbalance (Acute Kidney Injury/Impairment)  Goal: Fluid and Electrolyte Balance  10/27/2023 1453 by Usha Reilly LPN  Outcome: Met  10/27/2023 1452 by Usha Reilly LPN  Outcome: Ongoing, Progressing     Problem: Oral Intake Inadequate (Acute Kidney Injury/Impairment)  Goal: Optimal Nutrition Intake  10/27/2023 1453 by Usha eRilly LPN  Outcome: Met  10/27/2023 1452 by Usha Reilly LPN  Outcome: Ongoing, Progressing     Problem: Renal Function Impairment (Acute Kidney Injury/Impairment)  Goal: Effective Renal Function  10/27/2023 1453 by Usha Reilly LPN  Outcome: Met  10/27/2023 1452 by Usha Reilly LPN  Outcome: Ongoing, Progressing

## 2023-10-27 NOTE — PROGRESS NOTES
[facility] - Emergency Dept  Transplant Nephrology  Progress Note    Patient Name: Ibrahima Phelps  MRN: [id number]  Admission Date: 10/25/2023  Hospital Length of Stay: 0 days  Attending Provider: Darrel Xavier MD   Primary Care Physician: Anatoliy Colindres MD  Principal Problem:SOB (shortness of breath)    Consults  Subjective:     HPI:  67 yr old AAM with hx of kidney transplant in 4/12/2005 (bcr of 2.8-3.2), COPD, CAD s/p CABG in 2006 with active smoking, AFIB on amiodarone/Eliquis, HTN who is admitted for chest pain as well as SOB. Of note, patient recently admitted (10/13/23 through 10/2023) for similar symptoms. Patient with persistent chest pain with PET stress on 9/13/23 concerning for   - showing small sized, mild intensity apical inferior and inferoseptal reversible perfusion abnormality involving 6% of LV myocardium indicative of focal coronary stenosis.  -CT attenuation images demonstrate severe diffuse coronary calcifications in the LAD, and moderate diffuse coronary calcifications in the LCX and RCA territory and mild diffuse aortic calcifications in the descending aorta.  Patient seen by cardiology for unstable angina with discussion regarding LHC. Patient however declined due to DOUGLAS and his desire to not be on HD. Patient also seen by palliative care for goals of care.     Patient states that he went home but began noticing SOB, DAUGHERTY and persistent chest pain when he would try to do anything. Had significant SOB and CP prompting ED visit. Patient currently denies any fever. Denies any cough but does admit to SOB or CP. CP relieved by nitro taken earlier today. Denies any nausea, vomiting, abdominal pain, diarrhea.     On admission noted to have O2 sat of 97% on room air with SBP initially of 120's. Labs notable for creatinine of 5.4 (same creatinine at the time of previous discharge), no leukocytosis with mildly elevated troponins with EKG showing sinus rhythm with resolution of T wave abnormalities in  "inferior and anterior leads.     Overnight: He reports feeling overall better, and his CP has resolved. Good UOP ~2.5 liters overnight, ~-6 liters since admission, eating well.      Past Medical History:   Diagnosis Date    Atrial fibrillation     CAD (coronary artery disease) 2006    MI in 2006    CHF (congestive heart failure) 2017    CKD (chronic kidney disease) stage 3, GFR 30-59 ml/min     Dr. Latif.  in transplanted kidney    COVID-19 2/7/2023    Diverticulosis     Hyperlipidemia     Hypertension     Keloid cicatrix     Metabolic bone disease     Other emphysema 10/1/2019    Pericarditis     S/P kidney transplant 1992    x2 (1992 & 2005),    Thrombocytopenia     Thyroid disease     Tobacco use 09/05/2014       Past Surgical History:   Procedure Laterality Date    CARDIOVERSION  04/30/15    CHOLECYSTECTOMY      COLONOSCOPY  April 20, 2011    Diverticulosis, repeat recommended in 3 yrs., repeat colonoscopy 2014 revealed 2 polyps.  He should return in 5 years.    COLONOSCOPY N/A 3/13/2020    Procedure: COLONOSCOPY;  Surgeon: Chon Casper MD;  Location: [facility] (4TH FLR);  Service: Endoscopy;  Laterality: N/A;  ok to hold coumadin x5days-see telephone encounter 2/4/20-tb    COLONOSCOPY N/A 2/4/2021    Procedure: COLONOSCOPY;  Surgeon: Chon Casper MD;  Location: [facility] (4TH FLR);  Service: Endoscopy;  Laterality: N/A;  Eliquis - per Dr. GAVIN chua to hold x 2 days and "restarted asap"- ERW  last prep "poor", [id number] ordered/ Pt requests Dr. Casper  prep ins. mailed - COVID screening on 2/1/21 PCW- ERW    COLONOSCOPY N/A 3/3/2021    Procedure: COLONOSCOPY;  Surgeon: Chon Casper MD;  Location: [facility] (4TH FLR);  Service: Endoscopy;  Laterality: N/A;  Patient with his second poor bowel pre and has poor prep today.  If patient not intersted or can't get colonoscopy tomorrow will need constipation bowel prep and will need to restart his Eliquis today.Thanks,Chon     per Dr Blunt-ok to hold " Eliquis 2 days prior      COVID     CORONARY ANGIOPLASTY WITH STENT PLACEMENT  9/13/2006    IRRIGATION AND DEBRIDEMENT Right 8/30/2023    Procedure: IRRIGATION AND DEBRIDEMENT, RIGHT MIDDLE FINGER;  Surgeon: Martin Larsen MD;  [facility];  Service: Orthopedics;  Laterality: Right;    KIDNEY TRANSPLANT  2005    PARATHYROIDECTOMY      TREATMENT OF CARDIAC ARRHYTHMIA N/A 3/28/2022    Procedure: CARDIOVERSION;  Surgeon: Migue Blunt MD;  Location: [facility] EP LAB;  Service: Cardiology;  Laterality: N/A;  AF, DCC, MAC, GP, 3 PREP*RODRIGO deferred pt 100% compliant*       Review of patient's allergies indicates:   Allergen Reactions    Ace inhibitors Swelling    Verapamil Other (See Comments)     Other reaction(s): Unknown    Povidone-iodine Itching     Current Facility-Administered Medications   Medication Frequency    acetaminophen tablet 1,000 mg Q8H PRN    amiodarone tablet 200 mg Daily    apixaban tablet 5 mg BID    atorvastatin tablet 40 mg Daily    bisacodyL suppository 10 mg Daily PRN    clopidogreL tablet 75 mg Daily    dextrose 10% bolus 125 mL 125 mL PRN    dextrose 10% bolus 250 mL 250 mL PRN    doxazosin tablet 4 mg Q12H    famotidine tablet 20 mg Daily    ferrous sulfate tablet 1 each Daily    furosemide (LASIX) 200 mg in sodium chloride 0.9% SolP 100 mL continuous infusion (conc: 2 mg/mL) Continuous    glucagon (human recombinant) injection 1 mg PRN    glucose chewable tablet 16 g PRN    glucose chewable tablet 24 g PRN    hydrALAZINE injection 10 mg Q6H PRN    hydrALAZINE tablet 50 mg Q8H    iron sucrose injection 100 mg Once per day on Mon Wed Fri    isosorbide mononitrate 24 hr tablet 60 mg Daily    labetalol 20 mg/4 mL (5 mg/mL) IV syring Q6H PRN    melatonin tablet 6 mg Nightly PRN    metoprolol succinate (TOPROL-XL) 24 hr tablet 25 mg BID    metoprolol tartrate (LOPRESSOR) split tablet 12.5 mg Once    paricalcitoL capsule 1 mcg Every Mon, Wed, Fri    polyethylene glycol packet 17  g Daily PRN    predniSONE tablet 5 mg Daily    promethazine tablet 25 mg Q6H PRN    sevelamer carbonate tablet 800 mg TID WM    sodium bicarbonate tablet 1,300 mg TID    sodium chloride 0.9% flush 10 mL PRN    tacrolimus capsule 3 mg BID     Family History       Problem Relation (Age of Onset)    Heart disease Father    Kidney disease Sister, Brother    Kidney failure Sister, Brother    No Known Problems Sister, Brother, Sister, Sister    Thyroid disease Mother          Tobacco Use    Smoking status: Former     Current packs/day: 0.00     Average packs/day: 0.5 packs/day for 40.0 years (20.0 ttl pk-yrs)     Types: Cigarettes     Start date: 1982     Quit date: 2022     Years since quittin.6    Smokeless tobacco: Never    Tobacco comments:     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     Patient is currently retired   Substance and Sexual Activity    Alcohol use: No    Drug use: No    Sexual activity: Yes     Partners: Female     Review of Systems   Constitutional:  Negative for activity change, appetite change, chills and fever.   HENT:  Negative for congestion, sneezing and sore throat.    Eyes:  Negative for pain and itching.   Respiratory:  Negative for apnea, cough, shortness of breath and wheezing.    Cardiovascular:  Negative for chest pain.   Gastrointestinal:  Negative for abdominal pain, constipation, diarrhea, nausea and vomiting.   Genitourinary:  Negative for difficulty urinating, dysuria and frequency.   Musculoskeletal:  Negative for back pain, joint swelling and neck pain.   Skin:  Negative for color change.   Neurological:  Negative for dizziness, light-headedness and headaches.   Psychiatric/Behavioral:  Negative for behavioral problems and confusion. The patient is not nervous/anxious.      Objective:     Vital Signs (Most Recent):  Temp: 97.8 °F (36.6 °C) (10/27/23 0720)  Pulse: 63 (10/27/23 0720)  Resp: 18 (10/27/23 0720)  BP: (!) 140/64  (10/27/23 0720)  SpO2: 100 % (10/27/23 0720) Vital Signs (24h Range):  Temp:  [97.6 °F (36.4 °C)-98.7 °F (37.1 °C)] 97.8 °F (36.6 °C)  Pulse:  [51-68] 63  Resp:  [15-18] 18  SpO2:  [95 %-100 %] 100 %  BP: (130-197)/(60-81) 140/64     Weight: 92 kg (202 lb 13.2 oz) (10/26/23 0400)  Body mass index is 26.76 kg/m².  Body surface area is 2.18 meters squared.    I/O last 3 completed shifts:  In: 719.1 [P.O.:700; I.V.:19.1]  Out: 4130 [Urine:4130]    Physical Exam  Vitals reviewed.   Constitutional:       General: He is not in acute distress.     Appearance: Normal appearance. He is not ill-appearing or toxic-appearing.   HENT:      Head: Normocephalic and atraumatic.      Right Ear: External ear normal.      Left Ear: External ear normal.      Nose: Nose normal.   Eyes:      General: No scleral icterus.     Conjunctiva/sclera: Conjunctivae normal.   Cardiovascular:      Rate and Rhythm: Normal rate and regular rhythm.      Pulses: Normal pulses.      Heart sounds: Normal heart sounds. No murmur heard.     No friction rub.   Pulmonary:      Effort: Pulmonary effort is normal. No respiratory distress.      Breath sounds: Normal breath sounds. No wheezing or rhonchi.   Abdominal:      General: Bowel sounds are normal. There is no distension.      Palpations: Abdomen is soft.      Tenderness: There is no abdominal tenderness. There is no guarding.   Musculoskeletal:         General: No swelling or deformity. Normal range of motion.      Cervical back: Normal range of motion and neck supple. No tenderness.      Right lower leg: No edema.      Left lower leg: No edema.   Skin:     General: Skin is warm and dry.      Coloration: Skin is not jaundiced.      Findings: No erythema or rash.   Neurological:      General: No focal deficit present.      Mental Status: He is alert and oriented to person, place, and time.      Motor: No weakness.   Psychiatric:         Mood and Affect: Mood normal.         Behavior: Behavior normal.          Assessment/Plan:     67 yr old AAM with hx of kidney transplant in 2005 (bcrof 2.8-3.2), COPD, CAD s/p CABG in 2006 with active smoking, AFIB on Amiodarone and Eliquis, poorly Hypertension who is admitted for shortness of breath at rest and dyspnea on exertion     1) Unstable Angina  Improved after diuresis, continue current regimen, Cardiology to manage and we will defer to them for timing of LHC and any intervention necessary    2) Hypervolemia  Improving ~6 liters total UOP, multifactorial in the setting of CKD 5 and what is likely cardiorenal physiology as well, good UOP ~2.5 liters with Furosemide infusion  - Would recommend changing the dose of Furosemide to 80 mg PO twice daily today    3) KATINA of Kidney Transplant  Most consistent with cardiorenal physiology, we would expect improvement with diuresis, continues to have stable but advanced CKD stage G5A3, baseline creatinine is unchanged from discharge last week ~5.5    4) Immunosuppression  - Continue prograf 3 mg BID, today level 4.7  - Continue Prednisone 5 mg daily  - Discontinued Cellcept roughly a year ago at the direction of Dr. Diaz/Dr. Webb  - Monitor daily for toxicity and drug/drug interactions    5) CKD stage G5A3  Patient has been refusing dialysis in the recent past, but is now agreeable to do HD as well as left heart cath during this admission.  - There is no acute need for dialysis at this time, we will continue to manage CKD G5A3 co-morbidities and make specific medical recommendations understanding that if he becomes difficult to medically manage dialysis will be necessary    6) Hypertension  Near at goal of ~140/90 while in the hospital, some modest improvement with diuresis and correcting hypervolemia  - continue current home regimen, Doxazosin, Furosemide 200 mg twice daily, Hydralazine, Metoprolol, if not at goal consider Nifedipine    7) CAD CABG  - We would defer to cardiology regarding need and timing for LHC and any  intervention that would be necessary    8) COPD  Clinically stable, appears asymptomatic, rate well controlled, continue routine home regimen, managed by primary team    9) AFIB on Eliquis   Rate well controlled with current regimen, managed by cardiology during this admission  - Current regimen consists of Amiodarone and Metoprolol    10) Acid Base and Electrolytes  NAGMA Most consistent with CKD 5  - Improved with Sodium Bicarbonate 1300 mg three times daily  Hypomagnesemia Improved recommend IV replacement, routine monitoring, potassium low normal     11) Anemia of CKD and Iron Deficiency  Recommend IV iron 100 mg MWF for 1 gram total dose, follow iron stores regularly  - EPO 10K units MWF, monitor Hgb/Hct    12) CKD-MBD  Hyperphosphatemia Recommend Sevelemer 800 mg with meals, goal less than 5, has had this drug in the past without side effects  Vitamin D Deficiency Goal greater than 30, most recent 54  Secondary Hyperparathyroidism Last iPTH 73 no specific therapy indicated, correct PO4    13) Cardiac Diet Please       We will continue to follow. Please call if you have any questions.        Jose Warren, DO  [facility]

## 2023-10-27 NOTE — SUBJECTIVE & OBJECTIVE
Interval History: CR is up to 5.8, -2L UOP on lasix drip. Reports improved SOB.It seems HD may be needed soon.    Review of Systems   Constitutional:  Positive for activity change. Negative for chills and fever.   Respiratory:  Positive for shortness of breath. Negative for chest tightness.    Cardiovascular:  Positive for chest pain (pleuritic, resolved). Negative for leg swelling.   Gastrointestinal:  Negative for abdominal pain, constipation, diarrhea, nausea and vomiting.   Genitourinary:  Negative for dysuria and frequency.   Neurological:  Negative for dizziness and weakness.     Objective:     Vital Signs (Most Recent):  Temp: 97.8 °F (36.6 °C) (10/27/23 0720)  Pulse: 63 (10/27/23 0720)  Resp: 18 (10/27/23 0720)  BP: (!) 140/64 (10/27/23 0720)  SpO2: 100 % (10/27/23 0720) Vital Signs (24h Range):  Temp:  [97.6 °F (36.4 °C)-98.7 °F (37.1 °C)] 97.8 °F (36.6 °C)  Pulse:  [51-68] 63  Resp:  [18] 18  SpO2:  [96 %-100 %] 100 %  BP: (138-197)/(60-81) 140/64     Weight: 92 kg (202 lb 13.2 oz)  Body mass index is 26.76 kg/m².    Intake/Output Summary (Last 24 hours) at 10/27/2023 0981  Last data filed at 10/27/2023 0930  Gross per 24 hour   Intake 350 ml   Output 2430 ml   Net -2080 ml         Physical Exam  Vitals and nursing note reviewed.   Constitutional:       Appearance: He is well-developed.      Comments: Pleasant on exam, speaking in full sentences. On RA   Eyes:      Pupils: Pupils are equal, round, and reactive to light.   Cardiovascular:      Rate and Rhythm: Normal rate and regular rhythm.   Pulmonary:      Effort: Pulmonary effort is normal.      Breath sounds: Normal breath sounds.   Abdominal:      Palpations: Abdomen is soft.      Tenderness: There is no abdominal tenderness.   Musculoskeletal:         General: No tenderness.      Right lower leg: No edema.      Left lower leg: No edema.   Skin:     General: Skin is warm and dry.   Neurological:      Mental Status: He is alert and oriented to person,  place, and time.   Psychiatric:         Behavior: Behavior normal.             Significant Labs: All pertinent labs within the past 24 hours have been reviewed.  BMP:   Recent Labs   Lab 10/27/23  0259   GLU 66*      K 4.0      CO2 22*   BUN 48*   CREATININE 5.8*   CALCIUM 7.6*   MG 2.6       Significant Imaging: I have reviewed all pertinent imaging results/findings within the past 24 hours.

## 2023-10-27 NOTE — PLAN OF CARE
POC reviewed with pt and wife, questions answered, PO antihypertensives effective. Pt ready to have lasix gtt discontinued. Good urine output noted, see flowsheets.   Problem: Adult Inpatient Plan of Care  Goal: Plan of Care Review  10/27/2023 0624 by Jazmin Alvarenga RN  Outcome: Ongoing, Progressing  10/27/2023 0623 by Jazmin Alvarenga RN  Outcome: Ongoing, Not Progressing  Goal: Patient-Specific Goal (Individualized)  10/27/2023 0624 by Jazmin Alvarenga RN  Outcome: Ongoing, Progressing  Flowsheets (Taken 10/27/2023 0624)  Individualized Care Needs: get off of lasix gtt  10/27/2023 0623 by Jazmin Alvarenga RN  Outcome: Ongoing, Not Progressing  Goal: Absence of Hospital-Acquired Illness or Injury  10/27/2023 0624 by Jazmin Alvarenga RN  Outcome: Ongoing, Progressing  10/27/2023 0623 by Jazmin Alvarenga RN  Outcome: Ongoing, Not Progressing  Intervention: Prevent Skin Injury  Flowsheets (Taken 10/27/2023 0624)  Skin Protection: tubing/devices free from skin contact  Intervention: Prevent and Manage VTE (Venous Thromboembolism) Risk  Flowsheets (Taken 10/27/2023 0624)  Activity Management: Ambulated to bathroom - L4  VTE Prevention/Management:   remove, assess skin, and reapply sequential compression device   ambulation promoted   intravenous hydration  Intervention: Prevent Infection  Flowsheets (Taken 10/27/2023 0624)  Infection Prevention:   personal protective equipment utilized   rest/sleep promoted  Goal: Optimal Comfort and Wellbeing  10/27/2023 0624 by Jazmin Alvarenga RN  Outcome: Ongoing, Progressing  10/27/2023 0623 by Jazmin Alvarenga RN  Outcome: Ongoing, Not Progressing  Intervention: Monitor Pain and Promote Comfort  Flowsheets (Taken 10/27/2023 0624)  Pain Management Interventions: medication offered  Intervention: Provide Person-Centered Care  Flowsheets (Taken 10/27/2023 0624)  Trust Relationship/Rapport:   care explained   choices provided   emotional support provided    thoughts/feelings acknowledged   empathic listening provided  Goal: Readiness for Transition of Care  10/27/2023 0624 by Jazmin Alvarenga RN  Outcome: Ongoing, Progressing  10/27/2023 0623 by Jazmin Alvarenga RN  Outcome: Ongoing, Not Progressing     Problem: Fluid and Electrolyte Imbalance (Acute Kidney Injury/Impairment)  Goal: Fluid and Electrolyte Balance  10/27/2023 0624 by Jazmin Alvarenga RN  Outcome: Ongoing, Progressing  10/27/2023 0623 by Jazmin Alvarenga RN  Outcome: Ongoing, Not Progressing     Problem: Infection  Goal: Absence of Infection Signs and Symptoms  10/27/2023 0624 by Jazmin Alvarenga RN  Outcome: Ongoing, Progressing  10/27/2023 0623 by Jazmin Alvarenga RN  Outcome: Ongoing, Not Progressing     Problem: Oral Intake Inadequate (Acute Kidney Injury/Impairment)  Goal: Optimal Nutrition Intake  10/27/2023 0624 by Jazmin Alvarenga RN  Outcome: Ongoing, Progressing  10/27/2023 0623 by Jazmin Alvarenga RN  Outcome: Ongoing, Not Progressing  Intervention: Promote and Optimize Nutrition  Flowsheets (Taken 10/27/2023 0624)  Oral Nutrition Promotion:   social interaction promoted   physical activity promoted     Problem: Renal Function Impairment (Acute Kidney Injury/Impairment)  Goal: Effective Renal Function  10/27/2023 0624 by Jazmin Alvarenga RN  Outcome: Ongoing, Progressing  10/27/2023 0623 by Jazmin Alvarenga RN  Outcome: Ongoing, Not Progressing  Intervention: Monitor and Support Renal Function  Flowsheets (Taken 10/27/2023 0624)  Medication Review/Management: medications reviewed

## 2023-10-27 NOTE — CODE 44
"MEDICARE CONDITION 44    (Reference: Transmittal 200 of the Medicare Manual)    A Medicare "Inpatient Admission" may be changed to an "Outpatient" (includes  Outpatient Observation) status, if the following conditions exist:  The change in patient status from inpatient to outpatient is made prior to        discharge or release, while the patient is still a patient in the hospital.   The hospital has not submitted a claim for the inpatient admission.  A physician concurs with the Utilization Committee decision.   Physician concurrence with the Utilization Committee's decision is documented         in the patient's records.         IMPLEMENTATION OF CONDITION CODE 44    Inpatient status has been reviewed prior to discharge. Change to Outpatient Observation status, in accordance with Condition Code 44.     By entering this in the medical record, I verify that I have reviewed and concur with the findings of the Utilization Review Committee and the Utilization Review Physician, and that the identified Patient does not meet medical necessity for Inpatient status. This patient is appropriate for the downgrade in status because patient experienced a faster than expected recovery. Outpatient Observation is the identified level of care appropriate for the Patient, and all of the above conditions for this status change have been met.   "

## 2023-10-27 NOTE — PROGRESS NOTES
"[facility] - Tele[facility]  Progress Note    Patient Name: Ibrahima Phelps  MRN: [id number]  Patient Class: OP- Observation   Admission Date: 10/25/2023  Length of Stay: 0 days  Attending Physician: Darrel Xavier MD  Primary Care Provider: Anatoliy Colindres MD        Subjective:     Principal Problem:SOB (shortness of breath)        HPI:  Ibrahima Phelps is a 67 y.o. male with a hx of CHF, HTN, Afib, CKD, and kidney transplant presents to the ED for shortness of breath. Of note, patient admitted last week for similar presentation. States he has been experiencing increased shortness of breath since discharge. Feels as though "he cannot get enough air in." Has associated stomach/ chest cramps when he feels short of breath. Symptoms are worse on exertion. States he took his lasix yesterday and has been taking it PRN due to his kidney function. Denies weighing himself at home. States he is complaint with his medications and does not miss any doses. BP at home has been running with systolic 180s-190s and sometimes will drop to systolic 130s-140s. Denies home O2 use. Denies fever, chills, cough, congestion, chest pain, abdominal pain, constipation, diarrhea, lower extremity swelling and urinary symptoms. States he was having very little urine output last admission but is unsure if it is back to baseline.     ED: AF, Hypertensive. Hgb 9.3. Cr 5.4. Tn 0.039. . EKG showed sinus rhythm with PACs. CXR showed worsening bibasilar pulmonary opacities which may be related to infectious/inflammatory process versus atelectasis or pulmonary edema in the appropriate clinical setting. Possible small bilateral pleural effusions. Given amio, Eliquis, plavix, doxazosin, hydralazine, and imdur in the ED.          Overview/Hospital Course:  No notes on file    Interval History: CR is up to 5.8, -2L UOP on lasix drip. Reports improved SOB.It seems HD may be needed soon.    Review of Systems   Constitutional:  " Positive for activity change. Negative for chills and fever.   Respiratory:  Positive for shortness of breath. Negative for chest tightness.    Cardiovascular:  Positive for chest pain (pleuritic, resolved). Negative for leg swelling.   Gastrointestinal:  Negative for abdominal pain, constipation, diarrhea, nausea and vomiting.   Genitourinary:  Negative for dysuria and frequency.   Neurological:  Negative for dizziness and weakness.     Objective:     Vital Signs (Most Recent):  Temp: 97.8 °F (36.6 °C) (10/27/23 0720)  Pulse: 63 (10/27/23 0720)  Resp: 18 (10/27/23 0720)  BP: (!) 140/64 (10/27/23 0720)  SpO2: 100 % (10/27/23 0720) Vital Signs (24h Range):  Temp:  [97.6 °F (36.4 °C)-98.7 °F (37.1 °C)] 97.8 °F (36.6 °C)  Pulse:  [51-68] 63  Resp:  [18] 18  SpO2:  [96 %-100 %] 100 %  BP: (138-197)/(60-81) 140/64     Weight: 92 kg (202 lb 13.2 oz)  Body mass index is 26.76 kg/m².    Intake/Output Summary (Last 24 hours) at 10/27/2023 0949  Last data filed at 10/27/2023 0930  Gross per 24 hour   Intake 350 ml   Output 2430 ml   Net -2080 ml         Physical Exam  Vitals and nursing note reviewed.   Constitutional:       Appearance: He is well-developed.      Comments: Pleasant on exam, speaking in full sentences. On RA   Eyes:      Pupils: Pupils are equal, round, and reactive to light.   Cardiovascular:      Rate and Rhythm: Normal rate and regular rhythm.   Pulmonary:      Effort: Pulmonary effort is normal.      Breath sounds: Normal breath sounds.   Abdominal:      Palpations: Abdomen is soft.      Tenderness: There is no abdominal tenderness.   Musculoskeletal:         General: No tenderness.      Right lower leg: No edema.      Left lower leg: No edema.   Skin:     General: Skin is warm and dry.   Neurological:      Mental Status: He is alert and oriented to person, place, and time.   Psychiatric:         Behavior: Behavior normal.             Significant Labs: All pertinent labs within the past 24 hours have been  reviewed.  BMP:   Recent Labs   Lab 10/27/23  0259   GLU 66*      K 4.0      CO2 22*   BUN 48*   CREATININE 5.8*   CALCIUM 7.6*   MG 2.6       Significant Imaging: I have reviewed all pertinent imaging results/findings within the past 24 hours.      Assessment/Plan:      * SOB (shortness of breath)  Patient presents with worsening SOB on exertion since discharge last week.  - AF, Hypertensive  - O2 100% on RA  - , Tn 0.039  - CXR showed worsening bibasilar pulmonary opacities which may be related to infectious/inflammatory process versus atelectasis or pulmonary edema in the appropriate clinical setting. Possible small bilateral pleural effusions  - will hold on diuresis until evaluated by consult services given complicate case  - Cardiology consulted for diuretic recs given kidney function  - walk test pending  Results for orders placed during the hospital encounter of 10/13/23    Echo    Interpretation Summary    Left Ventricle: The left ventricle is normal in size. Ventricular mass is normal. Normal wall thickness. Normal wall motion. There is normal systolic function with a visually estimated ejection fraction of 60 - 65%. Grade II diastolic dysfunction.    Left Atrium: Left atrium is severely dilated.    Right Ventricle: Normal right ventricular cavity size. Wall thickness is normal. Right ventricle wall motion  is normal. Systolic function is normal.    Right Atrium: Right atrium is dilated.    Aortic Valve: Moderately calcified cusps. There is moderate annular calcification present. Mildly restricted motion. There is mild stenosis. Aortic valve area by VTI is 1.85 cm². Aortic valve peak velocity is 2.12 m/s. Mean gradient is 9 mmHg. The dimensionless index is 0.49. There is mild to moderate aortic regurgitation.    Pulmonary Artery: The estimated pulmonary artery systolic pressure is 59 mmHg.    IVC/SVC: Normal venous pressure at 3 mmHg.      CKD (chronic kidney disease), stage  IV  Creatine stable for now. BMP reviewed- noted Estimated Creatinine Clearance: 14.8 mL/min (A) (based on SCr of 5.4 mg/dL (H)). according to latest data. Monitor UOP and serial BMP and adjust therapy as needed. Renally dose meds.  - Cr 5.4 (last known baseline ~4)  - continue to monitor daily CMP  - Nephrology consulted due to worsening kidney function  - avoid nephrotoxic medications    Anemia in stage 4 chronic kidney disease  - Hgb 9.3  - continue to monitor daily CBC  - transfuse Hgb < 7      Chronic obstructive pulmonary disease  - hx noted  - patient uses home inhalers PRN  - duo nebs PRN  - not on home O2  - keep O2 sats 88-92%    Paroxysmal atrial fibrillation  Patient with Paroxysmal (<7 days) atrial fibrillation which is controlled currently with Beta Blocker and Amiodarone. Patient is currently in sinus rhythm.[id number] Score: 2. Anticoagulation indicated. Anticoagulation done with Eliquis.  - continue tele    Hypertensive heart and kidney disease  Hypertensive  - continue home Imdur, metoprolol, hydralazine and doxazosin  - up titrate meds as needed  - continue to monitor vitals closely     H/O kidney transplant  - hx noted  - continue home prednisone and prograf  - prograf level pending  - KTM consulted, appreciate assistance     Mixed hyperlipidemia  - continue statin    CAD (coronary artery disease)  Patient with known CAD, which is controlled   - Will continue Plavix and Statin and monitor for S/Sx of angina/ACS.   - Continue to monitor on telemetry  - pt is currently chest pain free  - Tn 0.039, will continue to trend  - EKG w/o acute ischemic changes  - Cardiology consulted, appreciate recs  - STAT EKG and trop if chest pain occurs       VTE Risk Mitigation (From admission, onward)         Ordered     apixaban tablet 5 mg  2 times daily         10/25/23 0830                Discharge Planning   RAMU: 10/28/2023     Code Status: Full Code   Is the patient medically ready for discharge?:     Reason  for patient still in hospital (select all that apply): Patient unstable  Discharge Plan A: Home                  Darrel Xavier MD  Department of Hospital Medicine   [facility] - [facility]

## 2023-10-27 NOTE — PLAN OF CARE
[facility] - Telemetry Stepdown  Discharge Final Note    Primary Care Provider: Anatoliy Colindres MD    Expected Discharge Date: 10/27/2023      Future Appointments   Date Time Provider Department Center   11/2/2023  9:30 AM Alberta Loredo NP [facility]   11/6/2023 10:30 AM Emre Latif MD [facility] NE[facility] [place]   1/9/2024  9:00 AM Anatoliy Colindres MD [facility]          Final Discharge Note (most recent)       Final Note - 10/27/23 1423          Final Note    Assessment Type Final Discharge Note     Anticipated Discharge Disposition Home or Self Care     What phone number can be called within the next 1-3 days to see how you are doing after discharge? [phone]     Hospital Resources/Appts/Education Provided Appointments scheduled and added to AVS        Post-Acute Status    Post-Acute Authorization Other     Other Status No Post-Acute Service Needs     Discharge Delays None known at this time                     Important Message from Medicare  Important Message from Medicare regarding Discharge Appeal Rights: Given to patient/caregiver, Explained to patient/caregiver, Signed/date by patient/caregiver     Date IMM was signed: 10/27/23  Time IMM was signed: 8117        Chica Young RN  Ext [phone]

## 2023-10-30 ENCOUNTER — PATIENT MESSAGE (OUTPATIENT)
Dept: NEPHROLOGY | Facility: CLINIC | Age: 68
End: 2023-10-30
Payer: MEDICARE

## 2023-10-30 LAB — 1,25(OH)2D3 SERPL-MCNC: 23 PG/ML (ref 20–79)

## 2023-10-31 ENCOUNTER — TELEPHONE (OUTPATIENT)
Dept: NEPHROLOGY | Facility: CLINIC | Age: 68
End: 2023-10-31
Payer: MEDICARE

## 2023-10-31 ENCOUNTER — PATIENT MESSAGE (OUTPATIENT)
Dept: INTERNAL MEDICINE | Facility: CLINIC | Age: 68
End: 2023-10-31
Payer: MEDICARE

## 2023-10-31 RX ORDER — FUROSEMIDE 40 MG/1
80 TABLET ORAL 2 TIMES DAILY
Status: CANCELLED
Start: 2023-10-31

## 2023-10-31 RX ORDER — BNT162B2 ORIGINAL AND OMICRON BA.4/BA.5 .1125; .1125 MG/2.25ML; MG/2.25ML
INJECTION, SUSPENSION INTRAMUSCULAR
COMMUNITY
Start: 2023-09-01

## 2023-10-31 RX ORDER — INFLUENZA A VIRUS A/VICTORIA/4897/2022 IVR-238 (H1N1) ANTIGEN (FORMALDEHYDE INACTIVATED), INFLUENZA A VIRUS A/DARWIN/9/2021 SAN-010 (H3N2) ANTIGEN (FORMALDEHYDE INACTIVATED), INFLUENZA B VIRUS B/PHUKET/3073/2013 ANTIGEN (FORMALDEHYDE INACTIVATED), AND INFLUENZA B VIRUS B/MICHIGAN/01/2021 ANTIGEN (FORMALDEHYDE INACTIVATED) 60; 60; 60; 60 UG/.7ML; UG/.7ML; UG/.7ML; UG/.7ML
INJECTION, SUSPENSION INTRAMUSCULAR
COMMUNITY
Start: 2023-09-01

## 2023-10-31 NOTE — TELEPHONE ENCOUNTER
----- Message from Felicitas Cope sent at 10/31/2023  8:18 AM CDT -----  Pt's wife is requesting a call back regarding changes in medication from hospital. Call back number is [phone]. x. EL

## 2023-10-31 NOTE — TELEPHONE ENCOUNTER
Wife was called back and asked for refill on Lasix 40 mg and Tacrolimus to [facility].  She will check with PCP for Nifedipine refills.

## 2023-11-01 RX ORDER — TACROLIMUS 1 MG/1
3 CAPSULE ORAL EVERY 12 HOURS
Qty: 180 CAPSULE | Refills: 11
Start: 2023-11-01 | End: 2023-12-11 | Stop reason: SDUPTHER

## 2023-11-01 RX ORDER — FUROSEMIDE 20 MG/1
TABLET ORAL
Qty: 180 TABLET | Refills: 1 | Status: ON HOLD | OUTPATIENT
Start: 2023-11-01 | End: 2024-02-21 | Stop reason: HOSPADM

## 2023-11-01 RX ORDER — FUROSEMIDE 20 MG/1
TABLET ORAL
Qty: 180 TABLET | Refills: 1
Start: 2023-11-01 | End: 2023-11-01

## 2023-11-02 ENCOUNTER — PATIENT MESSAGE (OUTPATIENT)
Dept: INTERNAL MEDICINE | Facility: CLINIC | Age: 68
End: 2023-11-02

## 2023-11-02 RX ORDER — POTASSIUM CHLORIDE 750 MG/1
TABLET, EXTENDED RELEASE ORAL
Qty: 90 TABLET | Refills: 3 | Status: ON HOLD | OUTPATIENT
Start: 2023-11-02 | End: 2024-03-02 | Stop reason: HOSPADM

## 2023-11-06 ENCOUNTER — OFFICE VISIT (OUTPATIENT)
Dept: NEPHROLOGY | Facility: CLINIC | Age: 68
End: 2023-11-06
Payer: MEDICARE

## 2023-11-06 VITALS
HEIGHT: 73 IN | BODY MASS INDEX: 26.51 KG/M2 | DIASTOLIC BLOOD PRESSURE: 56 MMHG | SYSTOLIC BLOOD PRESSURE: 130 MMHG | WEIGHT: 200 LBS

## 2023-11-06 DIAGNOSIS — N25.81 SECONDARY RENAL HYPERPARATHYROIDISM: ICD-10-CM

## 2023-11-06 DIAGNOSIS — E55.9 VITAMIN D DEFICIENCY: ICD-10-CM

## 2023-11-06 DIAGNOSIS — E66.9 OBESITY (BMI 30-39.9): ICD-10-CM

## 2023-11-06 DIAGNOSIS — N18.4 ANEMIA DUE TO STAGE 4 CHRONIC KIDNEY DISEASE: ICD-10-CM

## 2023-11-06 DIAGNOSIS — D63.1 ANEMIA IN STAGE 5 CHRONIC KIDNEY DISEASE, NOT ON CHRONIC DIALYSIS: ICD-10-CM

## 2023-11-06 DIAGNOSIS — N18.5 ANEMIA IN STAGE 5 CHRONIC KIDNEY DISEASE, NOT ON CHRONIC DIALYSIS: ICD-10-CM

## 2023-11-06 DIAGNOSIS — Z94.0 IMMUNOSUPPRESSIVE MANAGEMENT ENCOUNTER FOLLOWING KIDNEY TRANSPLANT: ICD-10-CM

## 2023-11-06 DIAGNOSIS — Z87.891 FORMER SMOKER: ICD-10-CM

## 2023-11-06 DIAGNOSIS — I48.0 PAROXYSMAL ATRIAL FIBRILLATION: ICD-10-CM

## 2023-11-06 DIAGNOSIS — N18.5 CKD (CHRONIC KIDNEY DISEASE) STAGE 5, GFR LESS THAN 15 ML/MIN: Primary | ICD-10-CM

## 2023-11-06 DIAGNOSIS — R73.03 PREDIABETES: ICD-10-CM

## 2023-11-06 DIAGNOSIS — I25.10 CORONARY ARTERY DISEASE INVOLVING NATIVE CORONARY ARTERY OF NATIVE HEART WITHOUT ANGINA PECTORIS: ICD-10-CM

## 2023-11-06 DIAGNOSIS — Z94.0 KIDNEY REPLACED BY TRANSPLANT: ICD-10-CM

## 2023-11-06 DIAGNOSIS — D63.1 ANEMIA DUE TO STAGE 4 CHRONIC KIDNEY DISEASE: ICD-10-CM

## 2023-11-06 DIAGNOSIS — Z79.899 IMMUNOSUPPRESSIVE MANAGEMENT ENCOUNTER FOLLOWING KIDNEY TRANSPLANT: ICD-10-CM

## 2023-11-06 PROCEDURE — 1160F RVW MEDS BY RX/DR IN RCRD: CPT | Mod: CPTII,95,, | Performed by: INTERNAL MEDICINE

## 2023-11-06 PROCEDURE — 99215 PR OFFICE/OUTPT VISIT, EST, LEVL V, 40-54 MIN: ICD-10-PCS | Mod: 95,,, | Performed by: INTERNAL MEDICINE

## 2023-11-06 PROCEDURE — 1101F PT FALLS ASSESS-DOCD LE1/YR: CPT | Mod: CPTII,95,, | Performed by: INTERNAL MEDICINE

## 2023-11-06 PROCEDURE — 1111F PR DISCHARGE MEDS RECONCILED W/ CURRENT OUTPATIENT MED LIST: ICD-10-PCS | Mod: CPTII,95,, | Performed by: INTERNAL MEDICINE

## 2023-11-06 PROCEDURE — 1159F PR MEDICATION LIST DOCUMENTED IN MEDICAL RECORD: ICD-10-PCS | Mod: CPTII,95,, | Performed by: INTERNAL MEDICINE

## 2023-11-06 PROCEDURE — 1159F MED LIST DOCD IN RCRD: CPT | Mod: CPTII,95,, | Performed by: INTERNAL MEDICINE

## 2023-11-06 PROCEDURE — 3288F PR FALLS RISK ASSESSMENT DOCUMENTED: ICD-10-PCS | Mod: CPTII,95,, | Performed by: INTERNAL MEDICINE

## 2023-11-06 PROCEDURE — 1126F AMNT PAIN NOTED NONE PRSNT: CPT | Mod: CPTII,95,, | Performed by: INTERNAL MEDICINE

## 2023-11-06 PROCEDURE — 3044F HG A1C LEVEL LT 7.0%: CPT | Mod: CPTII,95,, | Performed by: INTERNAL MEDICINE

## 2023-11-06 PROCEDURE — 3075F SYST BP GE 130 - 139MM HG: CPT | Mod: CPTII,95,, | Performed by: INTERNAL MEDICINE

## 2023-11-06 PROCEDURE — 3044F PR MOST RECENT HEMOGLOBIN A1C LEVEL <7.0%: ICD-10-PCS | Mod: CPTII,95,, | Performed by: INTERNAL MEDICINE

## 2023-11-06 PROCEDURE — 3066F NEPHROPATHY DOC TX: CPT | Mod: CPTII,95,, | Performed by: INTERNAL MEDICINE

## 2023-11-06 PROCEDURE — 3008F PR BODY MASS INDEX (BMI) DOCUMENTED: ICD-10-PCS | Mod: CPTII,95,, | Performed by: INTERNAL MEDICINE

## 2023-11-06 PROCEDURE — 1111F DSCHRG MED/CURRENT MED MERGE: CPT | Mod: CPTII,95,, | Performed by: INTERNAL MEDICINE

## 2023-11-06 PROCEDURE — 3008F BODY MASS INDEX DOCD: CPT | Mod: CPTII,95,, | Performed by: INTERNAL MEDICINE

## 2023-11-06 PROCEDURE — 3078F DIAST BP <80 MM HG: CPT | Mod: CPTII,95,, | Performed by: INTERNAL MEDICINE

## 2023-11-06 PROCEDURE — 99215 OFFICE O/P EST HI 40 MIN: CPT | Mod: 95,,, | Performed by: INTERNAL MEDICINE

## 2023-11-06 PROCEDURE — 1126F PR PAIN SEVERITY QUANTIFIED, NO PAIN PRESENT: ICD-10-PCS | Mod: CPTII,95,, | Performed by: INTERNAL MEDICINE

## 2023-11-06 PROCEDURE — 3066F PR DOCUMENTATION OF TREATMENT FOR NEPHROPATHY: ICD-10-PCS | Mod: CPTII,95,, | Performed by: INTERNAL MEDICINE

## 2023-11-06 PROCEDURE — 3062F POS MACROALBUMINURIA REV: CPT | Mod: CPTII,95,, | Performed by: INTERNAL MEDICINE

## 2023-11-06 PROCEDURE — 3062F PR POS MACROALBUMINURIA RESULT DOCUMENTED/REVIEW: ICD-10-PCS | Mod: CPTII,95,, | Performed by: INTERNAL MEDICINE

## 2023-11-06 PROCEDURE — 3288F FALL RISK ASSESSMENT DOCD: CPT | Mod: CPTII,95,, | Performed by: INTERNAL MEDICINE

## 2023-11-06 PROCEDURE — 1160F PR REVIEW ALL MEDS BY PRESCRIBER/CLIN PHARMACIST DOCUMENTED: ICD-10-PCS | Mod: CPTII,95,, | Performed by: INTERNAL MEDICINE

## 2023-11-06 PROCEDURE — 3075F PR MOST RECENT SYSTOLIC BLOOD PRESS GE 130-139MM HG: ICD-10-PCS | Mod: CPTII,95,, | Performed by: INTERNAL MEDICINE

## 2023-11-06 PROCEDURE — 1101F PR PT FALLS ASSESS DOC 0-1 FALLS W/OUT INJ PAST YR: ICD-10-PCS | Mod: CPTII,95,, | Performed by: INTERNAL MEDICINE

## 2023-11-06 PROCEDURE — 3078F PR MOST RECENT DIASTOLIC BLOOD PRESSURE < 80 MM HG: ICD-10-PCS | Mod: CPTII,95,, | Performed by: INTERNAL MEDICINE

## 2023-11-06 RX ORDER — FUROSEMIDE 80 MG/1
80 TABLET ORAL EVERY MORNING
Qty: 90 TABLET | Refills: 1 | Status: ON HOLD | OUTPATIENT
Start: 2023-11-06 | End: 2024-02-21

## 2023-11-06 RX ORDER — CALCIUM ACETATE 667 MG/1
CAPSULE ORAL
Qty: 120 CAPSULE | Refills: 3 | Status: SHIPPED | OUTPATIENT
Start: 2023-11-06 | End: 2023-11-15

## 2023-11-06 NOTE — PROGRESS NOTES
Subjective:       Patient ID: Ibrahima Phelps is a 67 y.o. Black or  male who presents for follow-up evaluation of Chronic Kidney Disease      HPI   He is getting over an URI, has not 'gone to his chest' but now feeling better. He continues to smoke a few cigarettes a day. Off PPI for several months now and without dyspepsia. No LE edema and no SOB.  He's noticed some allograft 'twinges' but no overt pain but no problems with urination. His wife is still working at [employer] as a manager of housing for residents.     Interval history March 2019: he has three concerns:  1. Worsening tremors. Spilling corn and trouble with writing  2. Increased nocturia  3. Cold intolerance    He denies recent illness. No allogarft pain and no LUTS other than nocturia. He has unintentionally lost weight. No new medications. He is doing well off PPI. He is smoking 3 cigarettes to 3/4ppd depending on the day.     Interval history July 2019: he was hospitalized for CP and was found to be in SVT, his potassium was low in ER. He was CP free in ER after X 1 SL NTG and after SVT broke. Since then he feels well. He has lost a little bit of weight. Still smoking     Interval history Nov 2019: feels poorly--has URI and lost voice, no sick contacts. Kidney txp is managing Prograf--Cr is higher than baseline but Prograf is running high. No LUTS and no allograft pain.     Interval history April 2020:  The patient location is: Home  The chief complaint leading to consultation is: CKD and kidney transplant recioient  Visit type: audiovisual  Total time spent with patient:  28 minutes  Each patient to whom he or she provides medical services by telemedicine is:  (1) informed of the relationship between the physician and patient and the respective role of any other health care provider with respect to management of the patient; and (2) notified that he or she may decline to receive medical services by telemedicine and may withdraw from such  care at any time.  Notes: He is doing well. He stays home, his wife does the shopping and errands. He was admitted to the hospital in late March for chest pain, felt to be musculoskeletal. They started hydralazine for BP, lowered too much at home with symptoms and Cards advised to cut in half. No LE edema. No allograft pain and no LUTS. He still takes his calcium as instructed.   Interval lhistory Aug 2020:  He feels well. No hospital stays since last visit. He notes a skin lesion to right wrist, not pruritic, he noted similar spots when he was on coumadin but not as many when he was on Eliquis, it is actually improving. It  doesn't justice. He had one day of allograft tenderness but no LUTS associated with it and no recurrence. No new medications.     Interval history Jan 2021:   The patient location is: Home  The chief complaint leading to consultation is: CKD and kidney transplant   Visit type: audiovisual  Face to Face time with patient: 32 minutes  51 minutes of total time spent on the encounter, which includes face to face time and non-face to face time preparing to see the patient (eg, review of tests), Obtaining and/or reviewing separately obtained history, Documenting clinical information in the electronic or other health record, Independently interpreting results (not separately reported) and communicating results to the patient/family/caregiver, or Care coordination (not separately reported).   Each patient to whom he or she provides medical services by telemedicine is:  (1) informed of the relationship between the physician and patient and the respective role of any other health care provider with respect to management of the patient; and (2) notified that he or she may decline to receive medical services by telemedicine and may withdraw from such care at any time.  Notes: He notes BP is high. He feels well. He remains feeling cold most of the time. No new medications.     Interval history July 2021: He  is doing well. Brings a BP log with the average above goal. No CP     Interval History Jan 2022: He is feeling well. No allograft pain. No recent ER visits. Tremors about the same--saw Neuro     Interval History June 2022: He saw ENT and diagnosed with BPV, iproving. New baseline allograft function, has seen txp very closely. RLE edema. Has history of smoking, QUIT in Feb 2022     Sep 2022: He is feeling well. ER visit for gout of elbow and ER visit for SOB improved with Lasix IV dose.     Jan 2023: Herniated disc flare, needs Flexeril, Abdominal muscle cramping felt to be due to hypocalcemia, but has GI follow up. Hb dropped but no black tarry stools    May 2023:  The patient location is: [place]  The chief complaint leading to consultation is: CKD, kidney txp status   Visit type: audiovisual  60 minutes of total time spent on the encounter, which includes face to face time and non-face to face time preparing to see the patient (eg, review of tests), Obtaining and/or reviewing separately obtained history, Documenting clinical information in the electronic or other health record, Independently interpreting results (not separately reported) and communicating results to the patient/family/caregiver, or Care coordination (not separately reported).   Each patient to whom he or she provides medical services by telemedicine is:  (1) informed of the relationship between the physician and patient and the respective role of any other health care provider with respect to management of the patient; and (2) notified that he or she may decline to receive medical services by telemedicine and may withdraw from such care at any time.  Notes: Since last visit he had a prolonged hospital stay at [facility] with KATINA and there was discussion of dialysis. He was discharged home with a Hamilton catheter then developed UTI, hospitalized at [facility] for a couple of days. His only complaint to me is his shoulder hurting, legs still weak, appetite is  good. MMF is on hold.     Sep 2023: Seeing EP, saw neuro. Restarted smoking, 5-6 cigarettes,     Nov 2023:   The patient location is: [place]  The chief complaint leading to consultation is: CKD kidney txp status   Visit type: audiovisual  60 minutes of total time spent on the encounter, which includes face to face time and non-face to face time preparing to see the patient (eg, review of tests), Obtaining and/or reviewing separately obtained history, Documenting clinical information in the electronic or other health record, Independently interpreting results (not separately reported) and communicating results to the patient/family/caregiver, or Care coordination (not separately reported).   Each patient to whom he or she provides medical services by telemedicine is:  (1) informed of the relationship between the physician and patient and the respective role of any other health care provider with respect to management of the patient; and (2) notified that he or she may decline to receive medical services by telemedicine and may withdraw from such care at any time.  Notes: Agreed to HD if needed, he recalls being on HD and felt like he was surviving not living'. SOB intermittent, yes orthopnea and DAUGHERTY.  LE edema none. Abd distention--yes.  Poor appetite, but for several months now, as multiple admissions. Has pruritus of legs. Still smoking 5 cig/day. NTG tab prn CP, started pre hospital and only once with chest pain since d/c.         Review of Systems   Constitutional:  Positive for activity change, appetite change and fatigue.   HENT:  Negative for facial swelling.    Respiratory:  Positive for shortness of breath.    Cardiovascular:  Positive for chest pain. Negative for leg swelling.   Gastrointestinal:  Negative for abdominal pain.   Genitourinary:  Positive for frequency.   Musculoskeletal:  Positive for arthralgias and back pain.   Allergic/Immunologic: Positive for immunocompromised state (kidney txp).    Neurological:  Positive for tremors.   Psychiatric/Behavioral:  Negative for confusion and decreased concentration.        Objective:      Physical Exam  Constitutional:       General: He is not in acute distress.     Appearance: He is not ill-appearing or toxic-appearing.   Pulmonary:      Effort: Pulmonary effort is normal. No respiratory distress.   Neurological:      Mental Status: He is alert and oriented to person, place, and time.   Psychiatric:         Mood and Affect: Mood normal.         Assessment:       1. CKD (chronic kidney disease) stage 5, GFR less than 15 ml/min    2. Kidney replaced by transplant    3. Secondary renal hyperparathyroidism    4. Vitamin D deficiency    5. Immunosuppressive management encounter following kidney transplant    6. Anemia due to stage 4 chronic kidney disease    7. Prediabetes    8. Paroxysmal atrial fibrillation    9. Former smoker    10. Coronary artery disease involving native coronary artery of native heart without angina pectoris    11. Obesity (BMI 30-39.9)              Plan:           KATINA X 2--first at [facility] and UTI due to indwelling Hamilton then multiple admissions afterwards with multiple KATINA episodes, most recent sCr is in the 5's.  - no hard indications for HD now (his preferred modality, not HHD and not PD)   - refer to Vascular  - will need to find a clinic near his home     ESRD s/p kidney transplsant with resultant CKD.   - now at CKD Stage 5     HTN--controlled at home, monitor, on Lasix for diuretic BP control. 130/58 ws BP yesterday     Hyperglycemia post transplant, steroid induced/PreDiabetes--monitor     Mineral and Bone Disease s/p parathyroidectomy for SHPT--continue current treatment with calcium supplements, Zemplar and D3. Add Phoslo for phos binder    CHF.Diastolic Dysfunction/Volume overload--increase Lasix to 80mg QAM, continue low sodium diet, BP and HR control. Cards follow up for afib     Labs 11/14   Refer to Heme and Vascular   Increase  Lasix 80mg QAM, check in Thursday   [facility] ([facility])

## 2023-11-06 NOTE — DISCHARGE SUMMARY
"[facility] - Telemetry [facility] Medicine  Discharge Summary      Patient Name: Ibrahima Phelps  MRN: [id number]  SHAVONNE: [id number]  Patient Class: OP- Observation  Admission Date: 10/25/2023  Hospital Length of Stay: 1 days  Discharge Date and Time: 10/27/2023  7:09 PM  Attending Physician: Yessy att. providers found   Discharging Provider: Darrel Xavier MD  Primary Care Provider: Anatoliy Colindres MD  [facility] Medicine Team: [facility] Darrel Xavier MD  Primary Care Team: [facility]    HPI:   Ibrahima Phelps is a 67 y.o. male with a hx of CHF, HTN, Afib, CKD, and kidney transplant presents to the ED for shortness of breath. Of note, patient admitted last week for similar presentation. States he has been experiencing increased shortness of breath since discharge. Feels as though "he cannot get enough air in." Has associated stomach/ chest cramps when he feels short of breath. Symptoms are worse on exertion. States he took his lasix yesterday and has been taking it PRN due to his kidney function. Denies weighing himself at home. States he is complaint with his medications and does not miss any doses. BP at home has been running with systolic 180s-190s and sometimes will drop to systolic 130s-140s. Denies home O2 use. Denies fever, chills, cough, congestion, chest pain, abdominal pain, constipation, diarrhea, lower extremity swelling and urinary symptoms. States he was having very little urine output last admission but is unsure if it is back to baseline.     ED: AF, Hypertensive. Hgb 9.3. Cr 5.4. Tn 0.039. . EKG showed sinus rhythm with PACs. CXR showed worsening bibasilar pulmonary opacities which may be related to infectious/inflammatory process versus atelectasis or pulmonary edema in the appropriate clinical setting. Possible small bilateral pleural effusions. Given amio, Eliquis, plavix, doxazosin, hydralazine, and imdur in the ED.          * No surgery found *      Hospital Course:   Ibrahima Phelps " "is a 67 y.o. male with a hx of CHF, HTN, Afib, CKD, and kidney transplant presents to the ED for shortness of breath. Of note, patient admitted last week for similar presentation. States he has been experiencing increased shortness of breath since discharge. Feels as though "he cannot get enough air in." Has associated stomach/ chest cramps when he feels short of breath. Symptoms are worse on exertion. States he took his lasix yesterday and has been taking it PRN due to his kidney function. Denies weighing himself at home. States he is complaint with his medications and does not miss any doses. BP at home has been running with systolic 180s-190s and sometimes will drop to systolic 130s-140s. Denies home O2 use. Denies fever, chills, cough, congestion, chest pain, abdominal pain, constipation, diarrhea, lower extremity swelling and urinary symptoms. States he was having very little urine output last admission but is unsure if it is back to baseline.      ED: AF, Hypertensive. Hgb 9.3. Cr 5.4. Tn 0.039. . EKG showed sinus rhythm with PACs. CXR showed worsening bibasilar pulmonary opacities which may be related to infectious/inflammatory process versus atelectasis or pulmonary edema in the appropriate clinical setting. Possible small bilateral pleural effusions. Given amio, Eliquis, plavix, doxazosin, hydralazine, and imdur in the ED.     Ws diuresed for  a few days with good results. Willdc home on standing dose of lasix.          Goals of Care Treatment Preferences:  Code Status: Full Code    Health care agent: Pt's wife is FREDAK  Health care agent number: No value filed.          What is most important right now is to focus on spending time at home, remaining as independent as possible, symptom/pain control, quality of life, even if it means sacrificing a little time.  Accordingly, we have decided that the best plan to meet the patient's goals includes continuing with treatment.      Consults:   Consults (From " "admission, onward)        Status Ordering Provider     Inpatient consult to Critical Care Medicine  Once        Provider:  (Not yet assigned)    Completed MIGUELANGEL CHAUDHARY          No new Assessment & Plan notes have been filed under this hospital service since the last note was generated.  Service: Hospital Medicine    Final Active Diagnoses:    Diagnosis Date Noted POA    PRINCIPAL PROBLEM:  SOB (shortness of breath) [R06.02] 04/30/2015 Yes    Immunosuppression [D84.9] 10/25/2023 Yes    CKD (chronic kidney disease), stage IV [N18.4] 10/13/2023 Yes    Anemia in stage 4 chronic kidney disease [N18.4, D63.1] 08/30/2023 Yes     Chronic    Chronic obstructive pulmonary disease [J44.9] 10/01/2019 Yes     Chronic    Paroxysmal atrial fibrillation [I48.0] 09/25/2017 Yes     Chronic    Hypertensive heart and kidney disease [I13.10] 02/22/2017 Yes    H/O kidney transplant [Z94.0] 07/10/2015 Not Applicable     Chronic    Mixed hyperlipidemia [E78.2]  Yes     Chronic    CAD (coronary artery disease) [I25.10]  Yes     Chronic      Problems Resolved During this Admission:       Discharged Condition: good    Disposition: Home or Self Care    Follow Up:    Patient Instructions:   No discharge procedures on file.    Significant Diagnostic Studies: Labs: BMP: No results for input(s): "GLU", "NA", "K", "CL", "CO2", "BUN", "CREATININE", "CALCIUM", "MG" in the last 48 hours.    Pending Diagnostic Studies:     None         Medications:  Reconciled Home Medications:      Medication List      CHANGE how you take these medications    ADVAIR -21 mcg/actuation Hfaa inhaler  Generic drug: fluticasone propion-salmeterol 115-21 mcg/dose  Inhale 2 puffs into the lungs every 12 (twelve) hours. Controller  What changed:   · when to take this  · reasons to take this        CONTINUE taking these medications    acetaminophen 500 MG tablet  Commonly known as: TYLENOL  Take 1 tablet (500 mg total) by mouth every 6 (six) hours as " needed for Pain.     * albuterol 90 mcg/actuation inhaler  Commonly known as: VENTOLIN HFA  Inhale 2 puffs into the lungs every 6 (six) hours as needed for Wheezing or Shortness of Breath. Rescue     * albuterol 2.5 mg /3 mL (0.083 %) nebulizer solution  Commonly known as: PROVENTIL  Take 3 mLs (2.5 mg total) by nebulization every 6 (six) hours as needed for Wheezing. Rescue     ALLEGRA ORAL  Take 1 tablet by mouth daily as needed (allergies).     amiodarone 200 MG Tab  Commonly known as: PACERONE  Take 1 tablet (200 mg total) by mouth once daily.     apixaban 5 mg Tab  Commonly known as: ELIQUIS  Take 1 tablet (5 mg total) by mouth 2 (two) times daily.     atorvastatin 40 MG tablet  Commonly known as: LIPITOR  Take 1 tablet (40 mg total) by mouth once daily.     b complex vitamins tablet  Commonly known as: B COMPLEX-VITAMIN B12  Take 1 tablet by mouth once daily.     CALCIUM 600 ORAL  Take 1 tablet by mouth 2 (two) times a day.     CHOLECALCIFEROL (VITAMIN D3) ORAL  Take 2 tablets by mouth 2 (two) times daily.     clopidogreL 75 mg tablet  Commonly known as: PLAVIX  Take 1 tablet (75 mg total) by mouth once daily.     doxazosin 4 MG tablet  Commonly known as: CARDURA  Take 1 tablet (4 mg total) by mouth every 12 (twelve) hours.     famotidine 20 MG tablet  Commonly known as: PEPCID  Take 1 tablet (20 mg total) by mouth 2 (two) times daily.     ferrous sulfate 325 (65 FE) MG EC tablet  Take 1 tablet (325 mg total) by mouth once daily.     fluticasone propionate 50 mcg/actuation nasal spray  Commonly known as: FLONASE  1 spray (50 mcg total) by Each Nostril route daily as needed for Allergies.     gabapentin 100 MG capsule  Commonly known as: NEURONTIN  Take 1 capsule (100 mg total) by mouth as needed (pain).     hydrALAZINE 50 MG tablet  Commonly known as: APRESOLINE  TAKE 1 TABLET (50 MG TOTAL) BY MOUTH 3 (THREE) TIMES DAILY.     isosorbide mononitrate 30 MG 24 hr tablet  Commonly known as: IMDUR  Take 2 tablets  (60 mg total) by mouth once daily.     metoprolol succinate 25 MG 24 hr tablet  Commonly known as: TOPROL-XL  Take 1 tablet (25 mg total) by mouth once daily.     MITIGARE 0.6 mg Cap  Generic drug: colchicine (gout)  TAKE 1 CAPSULE BY MOUTH TWICE A DAY AS NEEDED GOUT FLARE UP TO 3 DAYS     multivitamin per tablet  Commonly known as: THERAGRAN  Take 1 tablet by mouth Daily.     nitroGLYCERIN 0.4 MG SL tablet  Commonly known as: NITROSTAT  Place 1 tablet (0.4 mg total) under the tongue every 5 (five) minutes as needed for Chest pain.     omeprazole 20 MG capsule  Commonly known as: PRILOSEC  Take 1 capsule (20 mg total) by mouth daily as needed (heartburn).     paricalcitoL 1 MCG capsule  Commonly known as: ZEMPLAR  TAKE 1 CAPSULE ON MONDAY, WEDNESDAY AND FRIDAY     predniSONE 5 MG tablet  Commonly known as: DELTASONE  TAKE 1 TABLET BY MOUTH EVERY DAY IN THE MORNING     pulse oximeter device  Commonly known as: pulse oximeter  by Apply Externally route 2 (two) times a day. Use twice daily at 8 AM and 3 PM and record the value in [employer] [facility] as directed.         * This list has 2 medication(s) that are the same as other medications prescribed for you. Read the directions carefully, and ask your doctor or other care provider to review them with you.            STOP taking these medications    ammonium lactate 12 % Crea     furosemide 40 MG tablet  Commonly known as: LASIX        ASK your doctor about these medications    NIFEdipine 60 MG (OSM) 24 hr tablet  Commonly known as: PROCARDIA-XL  Take 1 tablet (60 mg total) by mouth 2 (two) times a day.            Indwelling Lines/Drains at time of discharge:   Lines/Drains/Airways     None                 Time spent on the discharge of patient: 15 minutes         Darrel Xavier MD  Department of Hospital Medicine  [facility] - Telemetry Stepdown

## 2023-11-06 NOTE — HOSPITAL COURSE
"Ibrahima Phelps is a 67 y.o. male with a hx of CHF, HTN, Afib, CKD, and kidney transplant presents to the ED for shortness of breath. Of note, patient admitted last week for similar presentation. States he has been experiencing increased shortness of breath since discharge. Feels as though "he cannot get enough air in." Has associated stomach/ chest cramps when he feels short of breath. Symptoms are worse on exertion. States he took his lasix yesterday and has been taking it PRN due to his kidney function. Denies weighing himself at home. States he is complaint with his medications and does not miss any doses. BP at home has been running with systolic 180s-190s and sometimes will drop to systolic 130s-140s. Denies home O2 use. Denies fever, chills, cough, congestion, chest pain, abdominal pain, constipation, diarrhea, lower extremity swelling and urinary symptoms. States he was having very little urine output last admission but is unsure if it is back to baseline.      ED: AF, Hypertensive. Hgb 9.3. Cr 5.4. Tn 0.039. . EKG showed sinus rhythm with PACs. CXR showed worsening bibasilar pulmonary opacities which may be related to infectious/inflammatory process versus atelectasis or pulmonary edema in the appropriate clinical setting. Possible small bilateral pleural effusions. Given amio, Eliquis, plavix, doxazosin, hydralazine, and imdur in the ED.     Ws diuresed for  a few days with good results. Willdc home on standing dose of lasix.     "

## 2023-11-06 NOTE — Clinical Note
Labs 11/14 Refer to Hematology for Anemia Refer to Vascular Surgery for AV access/Hemodialysis access

## 2023-11-07 ENCOUNTER — OUTPATIENT CASE MANAGEMENT (OUTPATIENT)
Dept: ADMINISTRATIVE | Facility: OTHER | Age: 68
End: 2023-11-07
Payer: MEDICARE

## 2023-11-07 NOTE — PROGRESS NOTES
Outpatient Care Management  Initial Patient Assessment    Patient: Ibrahima Phelps  MRN: [id number]  Date of Service: 11/07/2023  Completed by: Ruy Arredondo RN  Referral Date: 10/15/2023  Date of Eligibility: 10/16/2023  Program: High Risk  Status: Ongoing  Effective Dates: 11/7/2023 - present  Responsible Staff: Ruy Arredondo RN        Reason for Visit   Patient presents with    Nursing Assessment    OPCM Enrollment Call       Brief Summary:  Ibrahima Phelps was referred by Dr. Judge for pulmonary lobe nodule. Patient qualifies for program based on his risk score of 93.7%.   Active problem list, medical, surgical and social history reviewed.  Areas of need identified by patient include Mr. Phelps's wife states that he does not have any educational needs for his pulmonary nodule but does have educational needs on his CHF diagnosis.     Next steps: mail CHF education  Mail welcome letter   Follow up in one week.    Disability Status  Is the patient alert and oriented (person, place, time, and situation)?: Alert and oriented x 4  Hearing Difficulty or Deaf: no  Visual Difficulty or Blind: no  Visual and Hearing Needs Conclusion: no visual or hearing needs  Vision Management: Magnifier  Difficulty Concentrating, Remembering or Making Decisions: no  Communication Difficulty: no  Eating/Swallowing Difficulty: no  Walking or Climbing Stairs Difficulty: no  Walking or Climbing Stairs: -- (none)  Dressing/Bathing Difficulty: no  Dressing/Bathing: -- (none)  Toileting : Independent  Continence : Continence - Not a problem  Difficulty Managing Errands Independently: no  Equipment Currently Used at Home: none  ADL Conclusion Statement: able to complete all ADLs independently  Change in Functional Status Since Onset of Current Illness/Injury: no        Spiritual Beliefs  Spiritual, Cultural Beliefs, Jew Practices, Values that Affect Care: no      Social History     Socioeconomic History    Marital status:     Occupational History    Occupation:    Tobacco Use    Smoking status: Former     Current packs/day: 0.00     Average packs/day: 0.5 packs/day for 40.0 years (20.0 ttl pk-yrs)     Types: Cigarettes     Start date: 1982     Quit date: 2022     Years since quittin.6    Smokeless tobacco: Never    Tobacco comments:     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     Patient is currently retired   Substance and Sexual Activity    Alcohol use: No    Drug use: No    Sexual activity: Yes     Partners: Female   Social History Narrative    Retired      Social Determinants of Health     Financial Resource Strain: Low Risk  (2023)    Overall Financial Resource Strain (CARDIA)     Difficulty of Paying Living Expenses: Not very hard   Food Insecurity: No Food Insecurity (2023)    Hunger Vital Sign     Worried About Running Out of Food in the Last Year: Never true     Ran Out of Food in the Last Year: Never true   Recent Concern: Food Insecurity - Food Insecurity Present (10/25/2023)    Hunger Vital Sign     Worried About Running Out of Food in the Last Year: Sometimes true     Ran Out of Food in the Last Year: Never true   Transportation Needs: No Transportation Needs (2023)    PRAPARE - Transportation     Lack of Transportation (Medical): No     Lack of Transportation (Non-Medical): No   Physical Activity: Inactive (2023)    Exercise Vital Sign     Days of Exercise per Week: 0 days     Minutes of Exercise per Session: 0 min   Stress: Stress Concern Present (2023)    [employer] [place] [employer] - Occupational Stress Questionnaire     Feeling of Stress : Rather much   Social Connections: Moderately Integrated (2023)    Social Connection and Isolation Panel [NHANES]     Frequency of Communication with Friends and Family: More than three times a week     Frequency of Social Gatherings with Friends and Family: Once a  week     Attends Mormonism Services: More than 4 times per year     Active Member of Clubs or Organizations: No     Attends Club or Organization Meetings: Never     Marital Status:    Housing Stability: Low Risk  (11/7/2023)    Housing Stability Vital Sign     Unable to Pay for Housing in the Last Year: No     Number of Places Lived in the Last Year: 1     Unstable Housing in the Last Year: No       Roles and Relationships  Primary Source of Support/Comfort: spouse  Name of Support/Comfort Primary Source: Shea      Advance Directives (For Healthcare)  Advance Directive  (If Adv Dir status is received, view document under Adv Dir in header or Chart Review Media tab): Patient does not have Advance Directive, declines information.        Patient Reported Insurance  Verified current insurance plan:: [employer] Advantage  [employer] benefits discussed:: OTC Prescription Discounts; Transportation; Mail Order Pharmacy            11/7/2023    11:16 AM 1/9/2023     3:00 PM 9/30/2022     1:36 PM 1/31/2022     1:36 PM 5/26/2015    11:00 AM   Depression Patient Health Questionnaire   Over the last two weeks how often have you been bothered by little interest or pleasure in doing things Not at all Not at all Not at all Not at all Not at all   Over the last two weeks how often have you been bothered by feeling down, depressed or hopeless Not at all Not at all Not at all Not at all Not at all   PHQ-2 Total Score 0 0 0 0 0       Learning Assessment       11/07/2023 1118 [facility] (11/7/2023 - Present)   Created by Ruy Arredondo RN -  (Nurse) Status: Complete                 PRIMARY LEARNER     Primary Learner Name:  Ibrahima  - 11/07/2023 1118    Relationship:  Patient  - 11/07/2023 1118    Does the primary learner have any barriers to learning?:  No Barriers  - 11/07/2023 1118    What is the preferred language of the primary learner?:  English [facility] 11/07/2023 1118    How does the primary  learner prefer to learn new concepts?:  Listening  - 11/07/2023 1118    How often do you need to have someone help you read instructions, pamphlets, or written material from your doctor or pharmacy?:  Never  - 11/07/2023 1118        CO-LEARNER #1     No question answered        CO-LEARNER #2     No question answered        SPECIAL TOPICS     No question answered        ANSWERED BY:     No question answered        Edit History       Ruy Arredondo, RN -  (Nurse)   11/07/2023 1118

## 2023-11-07 NOTE — LETTER
Ibrahima Phelps  [address] [address]    Dear Ibrahima Phelps,     Welcome to [facility]s Outpatient Care Management Program. We are here to assist patients with multiple long-term (chronic) conditions who often need more personalized healthcare.    It was a pleasure talking with you today. My name is Ruy Arredondo RN. I look forward to working with you as your Care Manager. I will be contacting you by telephone routinely to help coordinate care and resolve issues.    My goal is to help you function at the healthiest and highest level possible. You can contact me directly at [phone].    As an [facility] patient with [employer], some of the services we provide, at no cost to you, include:     Development of an individualized care plan with a Registered Nurse   Connection with a   Assistance from a Community Health Worker  Connection with available resources and services    Coordinate communication among your care team members   Provide coaching and education  Help you understand your doctor's treatment plan  Help you obtain information about your insurance coverage.    All services provided by [facility]s Outpatient Care Managers and other care team members are coordinated with and communicated to your primary care team.      As part of your enrollment, you will be receiving education materials and more information about these services in your [facility] account, by phone, or through the mail. If you do not wish to participate or receive information, you can Opt Out by contacting our office at [phone].      Sincerely,        Ruy Arredondo RN  [facility]   Outpatient Care Management

## 2023-11-10 ENCOUNTER — TELEPHONE (OUTPATIENT)
Dept: VASCULAR SURGERY | Facility: CLINIC | Age: 68
End: 2023-11-10
Payer: MEDICARE

## 2023-11-10 DIAGNOSIS — Z01.818 PRE-OP EVALUATION: Primary | ICD-10-CM

## 2023-11-10 NOTE — TELEPHONE ENCOUNTER
Contacted pt and wife Shea to schedule vein mapping prior to appt with Dr. Melendez on 11/21/23. Appointment scheduled, pt and wife verified.

## 2023-11-14 ENCOUNTER — OUTPATIENT CASE MANAGEMENT (OUTPATIENT)
Dept: ADMINISTRATIVE | Facility: OTHER | Age: 68
End: 2023-11-14
Payer: MEDICARE

## 2023-11-14 ENCOUNTER — LAB VISIT (OUTPATIENT)
Dept: LAB | Facility: HOSPITAL | Age: 68
End: 2023-11-14
Attending: INTERNAL MEDICINE
Payer: MEDICARE

## 2023-11-14 ENCOUNTER — OFFICE VISIT (OUTPATIENT)
Dept: INTERNAL MEDICINE | Facility: CLINIC | Age: 68
End: 2023-11-14
Payer: MEDICARE

## 2023-11-14 VITALS
BODY MASS INDEX: 28.43 KG/M2 | DIASTOLIC BLOOD PRESSURE: 64 MMHG | HEIGHT: 73 IN | SYSTOLIC BLOOD PRESSURE: 160 MMHG | WEIGHT: 214.5 LBS | OXYGEN SATURATION: 97 % | HEART RATE: 70 BPM

## 2023-11-14 DIAGNOSIS — D69.6 THROMBOCYTOPENIA: ICD-10-CM

## 2023-11-14 DIAGNOSIS — Z79.899 IMMUNODEFICIENCY DUE TO TREATMENT WITH IMMUNOSUPPRESSIVE MEDICATION: Chronic | ICD-10-CM

## 2023-11-14 DIAGNOSIS — J84.10 CALCIFIED GRANULOMA OF LUNG: ICD-10-CM

## 2023-11-14 DIAGNOSIS — E78.2 MIXED HYPERLIPIDEMIA: Chronic | ICD-10-CM

## 2023-11-14 DIAGNOSIS — Z94.0 KIDNEY REPLACED BY TRANSPLANT: ICD-10-CM

## 2023-11-14 DIAGNOSIS — N25.81 SECONDARY RENAL HYPERPARATHYROIDISM: ICD-10-CM

## 2023-11-14 DIAGNOSIS — I70.0 AORTIC ATHEROSCLEROSIS: ICD-10-CM

## 2023-11-14 DIAGNOSIS — F17.200 SMOKER: ICD-10-CM

## 2023-11-14 DIAGNOSIS — Z00.00 ENCOUNTER FOR PREVENTIVE HEALTH EXAMINATION: Primary | ICD-10-CM

## 2023-11-14 DIAGNOSIS — R63.4 WEIGHT LOSS, ABNORMAL: ICD-10-CM

## 2023-11-14 DIAGNOSIS — N18.5 CKD (CHRONIC KIDNEY DISEASE) STAGE 5, GFR LESS THAN 15 ML/MIN: ICD-10-CM

## 2023-11-14 DIAGNOSIS — R26.9 ABNORMALITY OF GAIT AND MOBILITY: ICD-10-CM

## 2023-11-14 DIAGNOSIS — Z86.010 HISTORY OF ADENOMATOUS POLYP OF COLON: ICD-10-CM

## 2023-11-14 DIAGNOSIS — E55.9 VITAMIN D DEFICIENCY: ICD-10-CM

## 2023-11-14 DIAGNOSIS — I50.33 ACUTE ON CHRONIC HEART FAILURE WITH PRESERVED EJECTION FRACTION: ICD-10-CM

## 2023-11-14 DIAGNOSIS — I13.0 HYPERTENSIVE HEART AND RENAL DISEASE WITH RENAL FAILURE, STAGE 1 THROUGH STAGE 4 OR UNSPECIFIED CHRONIC KIDNEY DISEASE, WITH HEART FAILURE: ICD-10-CM

## 2023-11-14 DIAGNOSIS — G62.9 POLYNEUROPATHY: ICD-10-CM

## 2023-11-14 DIAGNOSIS — I20.0 UNSTABLE ANGINA PECTORIS: ICD-10-CM

## 2023-11-14 DIAGNOSIS — G25.0 BENIGN ESSENTIAL TREMOR: Chronic | ICD-10-CM

## 2023-11-14 DIAGNOSIS — D84.821 IMMUNODEFICIENCY DUE TO TREATMENT WITH IMMUNOSUPPRESSIVE MEDICATION: Chronic | ICD-10-CM

## 2023-11-14 DIAGNOSIS — I10 PRIMARY HYPERTENSION: ICD-10-CM

## 2023-11-14 DIAGNOSIS — I27.20 PULMONARY HTN: Chronic | ICD-10-CM

## 2023-11-14 DIAGNOSIS — I25.10 CORONARY ARTERY DISEASE INVOLVING NATIVE CORONARY ARTERY OF NATIVE HEART WITHOUT ANGINA PECTORIS: Chronic | ICD-10-CM

## 2023-11-14 DIAGNOSIS — I77.9 CAROTID ARTERY DISEASE, UNSPECIFIED LATERALITY, UNSPECIFIED TYPE: Chronic | ICD-10-CM

## 2023-11-14 DIAGNOSIS — I48.0 PAROXYSMAL ATRIAL FIBRILLATION: Chronic | ICD-10-CM

## 2023-11-14 DIAGNOSIS — R41.3 MEMORY CHANGES: ICD-10-CM

## 2023-11-14 DIAGNOSIS — J44.9 CHRONIC OBSTRUCTIVE PULMONARY DISEASE, UNSPECIFIED COPD TYPE: Chronic | ICD-10-CM

## 2023-11-14 LAB
25(OH)D3+25(OH)D2 SERPL-MCNC: 64 NG/ML (ref 30–96)
ALBUMIN SERPL BCP-MCNC: 3.2 G/DL (ref 3.5–5.2)
ANION GAP SERPL CALC-SCNC: 14 MMOL/L (ref 8–16)
BUN SERPL-MCNC: 53 MG/DL (ref 8–23)
CALCIUM SERPL-MCNC: 8.6 MG/DL (ref 8.7–10.5)
CHLORIDE SERPL-SCNC: 110 MMOL/L (ref 95–110)
CO2 SERPL-SCNC: 20 MMOL/L (ref 23–29)
CREAT SERPL-MCNC: 5.9 MG/DL (ref 0.5–1.4)
EST. GFR  (NO RACE VARIABLE): 9.7 ML/MIN/1.73 M^2
GLUCOSE SERPL-MCNC: 92 MG/DL (ref 70–110)
PHOSPHATE SERPL-MCNC: 4.9 MG/DL (ref 2.7–4.5)
POTASSIUM SERPL-SCNC: 3.8 MMOL/L (ref 3.5–5.1)
PTH-INTACT SERPL-MCNC: 39.4 PG/ML (ref 9–77)
SODIUM SERPL-SCNC: 144 MMOL/L (ref 136–145)

## 2023-11-14 PROCEDURE — 3288F FALL RISK ASSESSMENT DOCD: CPT | Mod: CPTII,S$GLB,, | Performed by: NURSE PRACTITIONER

## 2023-11-14 PROCEDURE — 99999 PR PBB SHADOW E&M-EST. PATIENT-LVL V: CPT | Mod: PBBFAC,,, | Performed by: NURSE PRACTITIONER

## 2023-11-14 PROCEDURE — 3062F PR POS MACROALBUMINURIA RESULT DOCUMENTED/REVIEW: ICD-10-PCS | Mod: CPTII,S$GLB,, | Performed by: NURSE PRACTITIONER

## 2023-11-14 PROCEDURE — 1126F AMNT PAIN NOTED NONE PRSNT: CPT | Mod: CPTII,S$GLB,, | Performed by: NURSE PRACTITIONER

## 2023-11-14 PROCEDURE — 1159F MED LIST DOCD IN RCRD: CPT | Mod: CPTII,S$GLB,, | Performed by: NURSE PRACTITIONER

## 2023-11-14 PROCEDURE — 3044F PR MOST RECENT HEMOGLOBIN A1C LEVEL <7.0%: ICD-10-PCS | Mod: CPTII,S$GLB,, | Performed by: NURSE PRACTITIONER

## 2023-11-14 PROCEDURE — 83970 ASSAY OF PARATHORMONE: CPT | Performed by: INTERNAL MEDICINE

## 2023-11-14 PROCEDURE — G0439 PPPS, SUBSEQ VISIT: HCPCS | Mod: S$GLB,,, | Performed by: NURSE PRACTITIONER

## 2023-11-14 PROCEDURE — G0439 PR MEDICARE ANNUAL WELLNESS SUBSEQUENT VISIT: ICD-10-PCS | Mod: S$GLB,,, | Performed by: NURSE PRACTITIONER

## 2023-11-14 PROCEDURE — 1160F PR REVIEW ALL MEDS BY PRESCRIBER/CLIN PHARMACIST DOCUMENTED: ICD-10-PCS | Mod: CPTII,S$GLB,, | Performed by: NURSE PRACTITIONER

## 2023-11-14 PROCEDURE — 82306 VITAMIN D 25 HYDROXY: CPT | Performed by: INTERNAL MEDICINE

## 2023-11-14 PROCEDURE — 80069 RENAL FUNCTION PANEL: CPT | Performed by: INTERNAL MEDICINE

## 2023-11-14 PROCEDURE — 1170F PR FUNCTIONAL STATUS ASSESSED: ICD-10-PCS | Mod: CPTII,S$GLB,, | Performed by: NURSE PRACTITIONER

## 2023-11-14 PROCEDURE — 36415 COLL VENOUS BLD VENIPUNCTURE: CPT | Performed by: INTERNAL MEDICINE

## 2023-11-14 PROCEDURE — 1160F RVW MEDS BY RX/DR IN RCRD: CPT | Mod: CPTII,S$GLB,, | Performed by: NURSE PRACTITIONER

## 2023-11-14 PROCEDURE — 3062F POS MACROALBUMINURIA REV: CPT | Mod: CPTII,S$GLB,, | Performed by: NURSE PRACTITIONER

## 2023-11-14 PROCEDURE — 1159F PR MEDICATION LIST DOCUMENTED IN MEDICAL RECORD: ICD-10-PCS | Mod: CPTII,S$GLB,, | Performed by: NURSE PRACTITIONER

## 2023-11-14 PROCEDURE — 3066F NEPHROPATHY DOC TX: CPT | Mod: CPTII,S$GLB,, | Performed by: NURSE PRACTITIONER

## 2023-11-14 PROCEDURE — 1101F PR PT FALLS ASSESS DOC 0-1 FALLS W/OUT INJ PAST YR: ICD-10-PCS | Mod: CPTII,S$GLB,, | Performed by: NURSE PRACTITIONER

## 2023-11-14 PROCEDURE — 3078F DIAST BP <80 MM HG: CPT | Mod: CPTII,S$GLB,, | Performed by: NURSE PRACTITIONER

## 2023-11-14 PROCEDURE — 3066F PR DOCUMENTATION OF TREATMENT FOR NEPHROPATHY: ICD-10-PCS | Mod: CPTII,S$GLB,, | Performed by: NURSE PRACTITIONER

## 2023-11-14 PROCEDURE — 3077F SYST BP >= 140 MM HG: CPT | Mod: CPTII,S$GLB,, | Performed by: NURSE PRACTITIONER

## 2023-11-14 PROCEDURE — 3044F HG A1C LEVEL LT 7.0%: CPT | Mod: CPTII,S$GLB,, | Performed by: NURSE PRACTITIONER

## 2023-11-14 PROCEDURE — 1170F FXNL STATUS ASSESSED: CPT | Mod: CPTII,S$GLB,, | Performed by: NURSE PRACTITIONER

## 2023-11-14 PROCEDURE — 1101F PT FALLS ASSESS-DOCD LE1/YR: CPT | Mod: CPTII,S$GLB,, | Performed by: NURSE PRACTITIONER

## 2023-11-14 PROCEDURE — 1126F PR PAIN SEVERITY QUANTIFIED, NO PAIN PRESENT: ICD-10-PCS | Mod: CPTII,S$GLB,, | Performed by: NURSE PRACTITIONER

## 2023-11-14 PROCEDURE — 3077F PR MOST RECENT SYSTOLIC BLOOD PRESSURE >= 140 MM HG: ICD-10-PCS | Mod: CPTII,S$GLB,, | Performed by: NURSE PRACTITIONER

## 2023-11-14 PROCEDURE — 3288F PR FALLS RISK ASSESSMENT DOCUMENTED: ICD-10-PCS | Mod: CPTII,S$GLB,, | Performed by: NURSE PRACTITIONER

## 2023-11-14 PROCEDURE — 3078F PR MOST RECENT DIASTOLIC BLOOD PRESSURE < 80 MM HG: ICD-10-PCS | Mod: CPTII,S$GLB,, | Performed by: NURSE PRACTITIONER

## 2023-11-14 PROCEDURE — 99999 PR PBB SHADOW E&M-EST. PATIENT-LVL V: ICD-10-PCS | Mod: PBBFAC,,, | Performed by: NURSE PRACTITIONER

## 2023-11-14 NOTE — PROGRESS NOTES
"Ibrahima Phelps presented for a  Medicare AWV and comprehensive Health Risk Assessment today. The following components were reviewed and updated:    Medical history  Family History  Social history  Allergies and Current Medications  Health Risk Assessment  Health Maintenance  Care Team         ** See Completed Assessments for Annual Wellness Visit within the encounter summary.**         The following assessments were completed:  Living Situation  CAGE  Depression Screening  Timed Get Up and Go  Whisper Test  Cognitive Function Screening - Deferred, h/o memory problems  Nutrition Screening  ADL Screening  PAQ Screening  Review for Opioid Screening: Pt does not have Rx for Opioids.  Review for Substance Use Disorders: Patient does not use substances.         Vitals:    11/14/23 0832   BP: (!) 160/64   BP Location: Left arm   Pulse: 70   SpO2: 97%   Weight: 97.3 kg (214 lb 8.1 oz)   Height: 6' 1" (1.854 m)     Body mass index is 28.3 kg/m².    Physical Exam  Vitals reviewed.   Constitutional:       Appearance: Normal appearance.   HENT:      Head: Normocephalic.   Cardiovascular:      Rate and Rhythm: Normal rate.   Pulmonary:      Effort: Pulmonary effort is normal.   Abdominal:      General: Bowel sounds are normal.   Musculoskeletal:         General: Normal range of motion.      Right lower leg: No edema.      Left lower leg: No edema.   Skin:     General: Skin is warm and dry.      Capillary Refill: Capillary refill takes less than 2 seconds.   Neurological:      Mental Status: He is alert and oriented to person, place, and time.   Psychiatric:         Behavior: Behavior normal.         Thought Content: Thought content normal.         Judgment: Judgment normal.               Diagnoses and health risks identified today and associated recommendations/orders:    1. Encounter for preventive health examination  Assessments completed.  HM recommendations reviewed. Discussed covid and rsv vaccines. Defer colonoscopy at this " time.  F/u with PCP as scheduled.    2. CKD (chronic kidney disease) stage 5, GFR less than 15 ml/min  Chronic, stable on current regimen. Followed by nephrology.    3. Calcified granuloma of lung  Chronic, stable on current regimen. Followed by PCP.    4. Chronic obstructive pulmonary disease, unspecified COPD type  Chronic, stable on current regimen. Followed by PCP.    5. Aortic atherosclerosis  Chronic, stable on current regimen. Followed by cardiology. On statin.    6. Carotid artery disease, unspecified laterality, unspecified type  Chronic, stable on current regimen. Followed by cardiology. On statin.    7. Paroxysmal atrial fibrillation  Chronic, stable on current regimen. Followed by cardiology.    8. Acute on chronic heart failure with preserved ejection fraction  Chronic, stable on current regimen. Followed by cardiology.    9. Pulmonary HTN  Chronic, stable on current regimen. Followed by cardiology.    10. Thrombocytopenia  Chronic, stable on current regimen. Followed by PCP.    11. Immunodeficiency due to treatment with immunosuppressive medication  Chronic, stable on current regimen. Followed by kidney transplant.    12. Secondary renal hyperparathyroidism  Chronic, stable on current regimen. Followed by nephrology.    13. Kidney replaced by transplant  Chronic, stable on current regimen. Followed by kidney transplant.    14. Primary hypertension  Chronic, stable on current regimen. Followed by PCP / cardiology.    15. Mixed hyperlipidemia  Chronic, stable on current regimen. Followed by PCP / cardiology.    16. Hypertensive heart and renal disease with renal failure, stage 1 through stage 4 or unspecified chronic kidney disease, with heart failure  Chronic, stable on current regimen. Followed by PCP / cardiology / nephrology.    17. Unstable angina pectoris  Chronic, stable on current regimen. Followed by cardiology.    18. Coronary artery disease involving native coronary artery of native heart  without angina pectoris  Chronic, stable on current regimen. Followed by cardiology.    19. Memory changes  Chronic, stable on current regimen. Followed by PCP. Mini cog deferred today.    20. Benign essential tremor  Chronic, stable on current regimen. Followed by PCP.    21. Polyneuropathy  Chronic, stable on current regimen. Followed by PCP.    22. History of adenomatous polyp of colon  Chronic, stable. Due for colonoscopy, deferred at this time per patient condition. Followed by PCP.    23. Smoker  Chronic, stable on current regimen. Followed by PCP.    24. Abnormality of gait and mobility  Chronic, stable. No recent falls. Does not use assistive devices. Followed by PCP.    25. Weight loss, abnormal  Chronic, stable on current regimen. Followed by PCP. Weight stable today.      Provided Ibrahima with a 5-10 year written screening schedule and personal prevention plan. Recommendations were developed using the USPSTF age appropriate recommendations. Education, counseling, and referrals were provided as needed. After Visit Summary printed and given to patient which includes a list of additional screenings\tests needed.    Follow up in about 1 year (around 11/14/2024) for Medicare AWV and with PCP as scheduled.       Aleshia Camacho NP    I offered to discuss advanced care planning, including how to pick a person who would make decisions for you if you were unable to make them for yourself, called a health care power of , and what kind of decisions you might make such as use of life sustaining treatments such as ventilators and tube feeding when faced with a life limiting illness recorded on a living will that they will need to know. (How you want to be cared for as you near the end of your natural life)     X  Patient is unwilling to engage in a discussion regarding advance directives at this time.

## 2023-11-14 NOTE — PATIENT INSTRUCTIONS
1. Follow up with Dr. Colindres, Anatoliy LR MD as scheduled.    2. Covid booster today.    Counseling and Referral of Other Preventative  (Italic type indicates deductible and co-insurance are waived)    Patient Name: Ibrahima Phelps  Today's Date: 11/14/2023    Health Maintenance         Date Due Completion Date    RSV Vaccine (Age 60+) (1 - 1-dose 60+ series) Never done ---    Colorectal Cancer Screening 03/03/2023 3/3/2021    COVID-19 Vaccine (6 - 2023-24 season) 09/01/2023 10/13/2022    PROSTATE-SPECIFIC ANTIGEN 01/04/2024 1/4/2023    Hemoglobin A1c (Prediabetes) 01/04/2024 1/4/2023    LDCT Lung Screen 10/13/2024 10/13/2023    High Dose Statin 11/06/2024 11/6/2023    Lipid Panel 01/04/2028 1/4/2023    TETANUS VACCINE 03/12/2028 3/12/2018          No orders of the defined types were placed in this encounter.      The following information is provided to all patients.  This information is to help you find resources for any of the problems found today that may be affecting your health:                Living healthy guide: www.[facility].[place].gov      Understanding Diabetes: www.diabetes.org      Eating healthy: www.cdc.gov/healthyweight      CDC home safety checklist: www.cdc.gov/steadi/patient.html      Agency on Aging: www.goea.[place].[facility]      Alcoholics anonymous (AA): www.aa.org      Physical Activity: www.lynnette.nih.gov/kw4mmwj      Tobacco use: www.quitwithusla.org

## 2023-11-15 RX ORDER — CALCIUM ACETATE 667 MG/1
CAPSULE ORAL
Qty: 360 CAPSULE | Refills: 1 | Status: ON HOLD | OUTPATIENT
Start: 2023-11-15 | End: 2024-02-21 | Stop reason: HOSPADM

## 2023-11-17 ENCOUNTER — HOSPITAL ENCOUNTER (OUTPATIENT)
Dept: VASCULAR SURGERY | Facility: CLINIC | Age: 68
Discharge: HOME | End: 2023-11-17
Attending: SURGERY
Payer: MEDICARE

## 2023-11-17 DIAGNOSIS — Z01.818 PRE-OP EVALUATION: ICD-10-CM

## 2023-11-17 PROCEDURE — 93970 EXTREMITY STUDY: CPT | Mod: S$GLB,,, | Performed by: SURGERY

## 2023-11-17 PROCEDURE — 93970 PR US DUPLEX, UPPER OR LOWER EXT VENOUS,COMPLETE BILAT: ICD-10-PCS | Mod: S$GLB,,, | Performed by: SURGERY

## 2023-11-21 ENCOUNTER — OUTPATIENT CASE MANAGEMENT (OUTPATIENT)
Dept: ADMINISTRATIVE | Facility: OTHER | Age: 68
End: 2023-11-21
Payer: MEDICARE

## 2023-11-21 ENCOUNTER — INITIAL CONSULT (OUTPATIENT)
Dept: VASCULAR SURGERY | Facility: CLINIC | Age: 68
End: 2023-11-21
Payer: MEDICARE

## 2023-11-21 VITALS
BODY MASS INDEX: 28.07 KG/M2 | HEART RATE: 70 BPM | HEIGHT: 72 IN | TEMPERATURE: 98 F | SYSTOLIC BLOOD PRESSURE: 168 MMHG | WEIGHT: 207.25 LBS | DIASTOLIC BLOOD PRESSURE: 79 MMHG

## 2023-11-21 DIAGNOSIS — N18.5 CKD (CHRONIC KIDNEY DISEASE), STAGE V: Primary | ICD-10-CM

## 2023-11-21 DIAGNOSIS — N18.5 CKD (CHRONIC KIDNEY DISEASE) STAGE 5, GFR LESS THAN 15 ML/MIN: ICD-10-CM

## 2023-11-21 PROCEDURE — 99999 PR PBB SHADOW E&M-EST. PATIENT-LVL V: ICD-10-PCS | Mod: PBBFAC,,, | Performed by: SURGERY

## 2023-11-21 PROCEDURE — 1111F DSCHRG MED/CURRENT MED MERGE: CPT | Mod: CPTII,S$GLB,, | Performed by: SURGERY

## 2023-11-21 PROCEDURE — 99205 OFFICE O/P NEW HI 60 MIN: CPT | Mod: S$GLB,,, | Performed by: SURGERY

## 2023-11-21 PROCEDURE — 1111F PR DISCHARGE MEDS RECONCILED W/ CURRENT OUTPATIENT MED LIST: ICD-10-PCS | Mod: CPTII,S$GLB,, | Performed by: SURGERY

## 2023-11-21 PROCEDURE — 99999 PR PBB SHADOW E&M-EST. PATIENT-LVL V: CPT | Mod: PBBFAC,,, | Performed by: SURGERY

## 2023-11-21 PROCEDURE — 99205 PR OFFICE/OUTPT VISIT, NEW, LEVL V, 60-74 MIN: ICD-10-PCS | Mod: S$GLB,,, | Performed by: SURGERY

## 2023-11-21 NOTE — PROGRESS NOTES
"VASCULAR SURGERY SERVICE    REFERRING DOCTOR: Emre Latif MD    CHIEF COMPLAINT: CKD 5    HISTORY OF PRESENT ILLNESS: Ibrahima Phelps is a 68 y.o. male status post 2 kidney transplantations the last in 2005, now with the client renal function with a GFR of 9.7 not yet initiated hemodialysis.  He is right-handed.  He would a Veronica AV fistula in the left many many years ago.    He is taking Eliquis for atrial fibrillation.    He is no history of AICD or pacemaker placement    Past Medical History:   Diagnosis Date    Atrial fibrillation     CAD (coronary artery disease) 2006    MI in 2006    CHF (congestive heart failure) 2017    CKD (chronic kidney disease) stage 3, GFR 30-59 ml/min     Dr. Latif.  in transplanted kidney    COVID-19 2/7/2023    Diverticulosis     Hyperlipidemia     Hypertension     Keloid cicatrix     Metabolic bone disease     Other emphysema 10/1/2019    Pericarditis     S/P kidney transplant 1992    x2 (1992 & 2005),    Thrombocytopenia     Thyroid disease     Tobacco use 09/05/2014       Past Surgical History:   Procedure Laterality Date    CARDIOVERSION  04/30/15    CHOLECYSTECTOMY      COLONOSCOPY  April 20, 2011    Diverticulosis, repeat recommended in 3 yrs., repeat colonoscopy 2014 revealed 2 polyps.  He should return in 5 years.    COLONOSCOPY N/A 3/13/2020    Procedure: COLONOSCOPY;  Surgeon: Chon Casper MD;  Location: [facility] ([address] [place] [address]);  Service: Endoscopy;  Laterality: N/A;  ok to hold coumadin x5days-see telephone encounter 2/4/20-tb    COLONOSCOPY N/A 2/4/2021    Procedure: COLONOSCOPY;  Surgeon: Chon Casper MD;  [facility]  Service: Endoscopy;  Laterality: N/A;  Eliquis - per Dr. GAVIN Blunt ok to hold x 2 days and "restarted asap"- ERW  last prep "poor", [id number] ordered/ Pt requests Dr. Casper  prep ins. mailed - COVID screening on 2/1/21 PCW- ERW    COLONOSCOPY N/A 3/3/2021    Procedure: COLONOSCOPY;  Surgeon: Chon Casper MD;  " Location: [facility] ENDO (4TH FLR);  Service: Endoscopy;  Laterality: N/A;  Patient with his second poor bowel pre and has poor prep today.  If patient not intersted or can't get colonoscopy tomorrow will need constipation bowel prep and will need to restart his Eliquis today.Thanks,Chon     per Dr Lopez to hold Eliquis 2 days prior      COVID     CORONARY ANGIOPLASTY WITH STENT PLACEMENT  9/13/2006    IRRIGATION AND DEBRIDEMENT Right 8/30/2023    Procedure: IRRIGATION AND DEBRIDEMENT, RIGHT MIDDLE FINGER;  Surgeon: Martin Larsen MD;  Location: [facility] OR 2ND FLR;  Service: Orthopedics;  Laterality: Right;    KIDNEY TRANSPLANT  2005    PARATHYROIDECTOMY      TREATMENT OF CARDIAC ARRHYTHMIA N/A 3/28/2022    Procedure: CARDIOVERSION;  Surgeon: Migue Blunt MD;  Location: [facility] EP LAB;  Service: Cardiology;  Laterality: N/A;  AF, DCC, MAC, GP, 3 PREP*RODRIGO deferred pt 100% compliant*         Current Outpatient Medications:     acetaminophen (TYLENOL) 500 MG tablet, Take 1 tablet (500 mg total) by mouth every 6 (six) hours as needed for Pain., Disp: 20 tablet, Rfl: 0    albuterol (PROVENTIL) 2.5 mg /3 mL (0.083 %) nebulizer solution, Take 3 mLs (2.5 mg total) by nebulization every 6 (six) hours as needed for Wheezing. Rescue, Disp: 9 mL, Rfl: 6    albuterol (VENTOLIN HFA) 90 mcg/actuation inhaler, Inhale 2 puffs into the lungs every 6 (six) hours as needed for Wheezing or Shortness of Breath. Rescue, Disp: 18 g, Rfl: 3    amiodarone (PACERONE) 200 MG Tab, Take 1 tablet (200 mg total) by mouth once daily., Disp: 30 tablet, Rfl: 11    apixaban (ELIQUIS) 5 mg Tab, Take 1 tablet (5 mg total) by mouth 2 (two) times daily., Disp: 180 tablet, Rfl: 3    atorvastatin (LIPITOR) 40 MG tablet, Take 1 tablet (40 mg total) by mouth once daily., Disp: 90 tablet, Rfl: 3    b complex vitamins (B COMPLEX-VITAMIN B12) tablet, Take 1 tablet by mouth once daily., Disp: 90 tablet, Rfl: 3    calcium acetate,phosphat bind, (PHOSLO) 109  mg capsule, TAKE 1 CAPSULE BY MOUTH THREE TIMES A DAY WITH MEALS AND SNACKS, Disp: 360 capsule, Rfl: 1    calcium carbonate (CALCIUM 600 ORAL), Take 1 tablet by mouth 2 (two) times a day., Disp: , Rfl:     CHOLECALCIFEROL, VITAMIN D3, ORAL, Take 2 tablets by mouth 2 (two) times daily., Disp: , Rfl:     clopidogreL (PLAVIX) 75 mg tablet, Take 1 tablet (75 mg total) by mouth once daily., Disp: 30 tablet, Rfl: 11    doxazosin (CARDURA) 4 MG tablet, Take 1 tablet (4 mg total) by mouth every 12 (twelve) hours., Disp: 180 tablet, Rfl: 3    famotidine (PEPCID) 20 MG tablet, Take 1 tablet (20 mg total) by mouth 2 (two) times daily., Disp: 180 tablet, Rfl: 3    fexofenadine HCl (ALLEGRA ORAL), Take 1 tablet by mouth daily as needed (allergies)., Disp: , Rfl:     fluticasone propionate (FLONASE) 50 mcg/actuation nasal spray, 1 spray (50 mcg total) by Each Nostril route daily as needed for Allergies., Disp: , Rfl:     FLUZONE HIGHDOSE QUAD 23-24  mcg/0.7 mL Syrg, , Disp: , Rfl:     furosemide (LASIX) 20 MG tablet, One po QAM, and one po at lunchtime if swelling prn, Disp: 180 tablet, Rfl: 1    furosemide (LASIX) 80 MG tablet, Take 1 tablet (80 mg total) by mouth every morning., Disp: 90 tablet, Rfl: 1    gabapentin (NEURONTIN) 100 MG capsule, Take 1 capsule (100 mg total) by mouth as needed (pain)., Disp: , Rfl:     hydrALAZINE (APRESOLINE) 50 MG tablet, TAKE 1 TABLET (50 MG TOTAL) BY MOUTH 3 (THREE) TIMES DAILY., Disp: 270 tablet, Rfl: 3    isosorbide mononitrate (IMDUR) 30 MG 24 hr tablet, Take 2 tablets (60 mg total) by mouth once daily., Disp: 60 tablet, Rfl: 11    metoprolol succinate (TOPROL-XL) 25 MG 24 hr tablet, Take 1 tablet (25 mg total) by mouth once daily. (Patient taking differently: Take 12.5 mg by mouth 2 (two) times daily.), Disp: 90 tablet, Rfl: 3    MITIGARE 0.6 mg Cap, TAKE 1 CAPSULE BY MOUTH TWICE A DAY AS NEEDED GOUT FLARE UP TO 3 DAYS, Disp: 15 capsule, Rfl: 11    multivitamin (THERAGRAN) per  tablet, Take 1 tablet by mouth Daily., Disp: , Rfl:     NIFEdipine (PROCARDIA-XL) 60 MG (OSM) 24 hr tablet, Take 1 tablet (60 mg total) by mouth 2 (two) times a day., Disp: 180 tablet, Rfl: 3    nitroGLYCERIN (NITROSTAT) 0.4 MG SL tablet, Place 1 tablet (0.4 mg total) under the tongue every 5 (five) minutes as needed for Chest pain., Disp: 25 tablet, Rfl: 0    omeprazole (PRILOSEC) 20 MG capsule, Take 1 capsule (20 mg total) by mouth daily as needed (heartburn)., Disp: , Rfl:     paricalcitoL (ZEMPLAR) 1 MCG capsule, TAKE 1 CAPSULE ON MONDAY, WEDNESDAY AND FRIDAY, Disp: 36 capsule, Rfl: 3    PFIZER COVID BIVAL,12Y UP,,PF, 30 mcg/0.3 mL injection, Inject into the muscle., Disp: , Rfl:     potassium chloride (KLOR-CON) 10 MEQ TbSR, Take 1 tablet (10 mEq total) by mouth daily as needed (to be taken with lasix).,, Disp: 90 tablet, Rfl: 3    predniSONE (DELTASONE) 5 MG tablet, TAKE 1 TABLET BY MOUTH EVERY DAY IN THE MORNING, Disp: 90 tablet, Rfl: 3    pulse oximeter (PULSE OXIMETER) device, by Apply Externally route 2 (two) times a day. Use twice daily at 8 AM and 3 PM and record the value in MyChart as directed., Disp: 1 each, Rfl: 0    tacrolimus (PROGRAF) 1 MG Cap, Take 3 capsules (3 mg total) by mouth every 12 (twelve) hours., Disp: 180 capsule, Rfl: 11    fluticasone propion-salmeterol 115-21 mcg/dose (ADVAIR HFA) 115-21 mcg/actuation HFAA inhaler, Inhale 2 puffs into the lungs every 12 (twelve) hours. Controller (Patient taking differently: Inhale 2 puffs into the lungs 2 (two) times daily as needed (shortness of breath). Controller), Disp: 12 g, Rfl: 5    Review of patient's allergies indicates:   Allergen Reactions    Ace inhibitors Swelling    Verapamil Other (See Comments)     Other reaction(s): Unknown    Povidone-iodine Itching       Family History   Problem Relation Age of Onset    No Known Problems Sister     No Known Problems Brother     Thyroid disease Mother         s/p surgery    Heart disease Father          had pacemaker    No Known Problems Sister     Kidney failure Sister     Kidney disease Sister     No Known Problems Sister     Kidney disease Brother     Kidney failure Brother         s/p transplant    Diabetes Mellitus Neg Hx     Stroke Neg Hx     Heart attack Neg Hx     Cancer Neg Hx     Celiac disease Neg Hx     Cirrhosis Neg Hx     Colon cancer Neg Hx     Esophageal cancer Neg Hx     Inflammatory bowel disease Neg Hx     Rectal cancer Neg Hx     Stomach cancer Neg Hx     Ulcerative colitis Neg Hx     Liver disease Neg Hx     Liver cancer Neg Hx     Crohn's disease Neg Hx     Melanoma Neg Hx        Social History     Tobacco Use    Smoking status: Former     Current packs/day: 0.00     Average packs/day: 0.5 packs/day for 40.0 years (20.0 ttl pk-yrs)     Types: Cigarettes     Start date: 1982     Quit date: 2022     Years since quittin.7    Smokeless tobacco: Never    Tobacco comments:     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     Patient is currently retired   Substance Use Topics    Alcohol use: No    Drug use: No       REVIEW OF SYSTEMS:  General: No chills, fever, malaise, changes in weight  HEENT: No visual changes, difficulty hearing  Cardiovascular: No chest pain, palpitations, claudication  Pulmonary: No dyspnea, cough, wheezing  Gastrointestinal: No nausea, vomiting, diarrhea, constipation  Genitourinary: No dysuria, low urine output, hematuria  Endocrine: No polydipsia, polyphagia  Hematologic: No fatigue with exertion, pica, pallor  Musculoskeletal: No extremity or joint pain, no back pain, no difficulty with ambulation  Neurologic: no seizures, no headaches, no weakness  Psychiatric: no mood disturbance      PHYSICAL EXAM:   BP (!) 168/79 (BP Location: Right arm, Patient Position: Sitting, BP Method: Medium (Automatic))   Pulse 70   Temp 98.2 °F (36.8 °C) (Oral)   Ht 6' (1.829 m)   Wt 94 kg (207 lb 3.7 oz)   BMI 28.11 kg/m²    Constitutional:  Alert,   Well-appearing  In no distress.   Neurological: Normal speech  no focal findings  CN II - XII grossly intact.    Psychiatric: Mood and affect appropriate and symmetric.   HEENT: Normocephalic / atraumatic  PERRLA  Midline trachea  No scars across the neck   Cardiac: Regular rate and rhythm.   Pulmonary: Normal pulmonary effort.   Abdomen: Soft, not distended.     Skin: Warm and well perfused.    Vascular:  Right radial pulse 1 to 2+, left brachial pulse 3 +   Extremities/  Musculoskeletal: No edema.   Ballotable left antecubital and upper arm cephalic vein with tourniquet     IMAGING:  Vein mapping suggests adequate upper arm left cephalic vein 4.1 antecubital fossa 4.0 distal arm although smaller in mid arm at 2.4 proximal arm 2.3    IMPRESSION:  CKD 5 not yet on dialysis with failing 2nd kidney transplant.  He would strongly prefer autogenous fistula and hopefully his left cephalic vein will indeed be adequate    PLAN:  Left brachiocephalic AV fistula creation (versus graft if vein inadequate), December 18, 2023  Regional block    I will personally interrogate his fistula prior to beginning his surgery, and a vein is found to be inadequate for autogenous fistula would proceed with a prosthetic graft    I have explained the risks, benefits and alternatives for this procedure in detail.  The patient voices understanding and all questions have be answered, and agrees to proceed with the procedure.     INDIGO Melendez III, MD, FACS  Professor and Chief, Vascular and Endovascular Surgery

## 2023-11-21 NOTE — LETTER
[facility] - Vascular Surg 5th Fl  [address]  [facility] [address]  Phone: [phone]  Fax: [phone] December 13, 2023      Emre Latif MD  [address] [place] [address]    Patient: Ibrahima Phelps   MR Number: [id number]   YOB: 1955   Date of Visit: 11/21/2023     Dear Dr. Latif:    Thank you for referring Ibrahima Phelps to me for evaluation. Attached are the relevant portions of my assessment and plan of care.    If you have questions, please do not hesitate to call me. I look forward to following Ibrahima along with you.    Sincerely,    JUANI Melendez III, MD   Professor and Chief  Vascular and Endovascular Surgery  Vice-Chair, Department of Surgery  [facility]     WCS/hcr    CC  Anatoliy Colindres MD

## 2023-11-21 NOTE — H&P (VIEW-ONLY)
"VASCULAR SURGERY SERVICE    REFERRING DOCTOR: Emre Latif MD    CHIEF COMPLAINT: CKD 5    HISTORY OF PRESENT ILLNESS: Ibrahima Phelps is a 68 y.o. male status post 2 kidney transplantations the last in 2005, now with the client renal function with a GFR of 9.7 not yet initiated hemodialysis.  He is right-handed.  He would a Veronica AV fistula in the left many many years ago.    He is taking Eliquis for atrial fibrillation.    He is no history of AICD or pacemaker placement    Past Medical History:   Diagnosis Date    Atrial fibrillation     CAD (coronary artery disease) 2006    MI in 2006    CHF (congestive heart failure) 2017    CKD (chronic kidney disease) stage 3, GFR 30-59 ml/min     Dr. Latif.  in transplanted kidney    COVID-19 2/7/2023    Diverticulosis     Hyperlipidemia     Hypertension     Keloid cicatrix     Metabolic bone disease     Other emphysema 10/1/2019    Pericarditis     S/P kidney transplant 1992    x2 (1992 & 2005),    Thrombocytopenia     Thyroid disease     Tobacco use 09/05/2014       Past Surgical History:   Procedure Laterality Date    CARDIOVERSION  04/30/15    CHOLECYSTECTOMY      COLONOSCOPY  April 20, 2011    Diverticulosis, repeat recommended in 3 yrs., repeat colonoscopy 2014 revealed 2 polyps.  He should return in 5 years.    COLONOSCOPY N/A 3/13/2020    Procedure: COLONOSCOPY;  Surgeon: Chon Casper MD;  Location: [facility] ([address] [place] [address]);  Service: Endoscopy;  Laterality: N/A;  ok to hold coumadin x5days-see telephone encounter 2/4/20-tb    COLONOSCOPY N/A 2/4/2021    Procedure: COLONOSCOPY;  Surgeon: Chon Casper MD;  [facility]  Service: Endoscopy;  Laterality: N/A;  Eliquis - per Dr. GAVIN Blunt ok to hold x 2 days and "restarted asap"- ERW  last prep "poor", [id number] ordered/ Pt requests Dr. Casper  prep ins. mailed - COVID screening on 2/1/21 PCW- ERW    COLONOSCOPY N/A 3/3/2021    Procedure: COLONOSCOPY;  Surgeon: Chon Casper MD;  " Location: [facility] ENDO (4TH FLR);  Service: Endoscopy;  Laterality: N/A;  Patient with his second poor bowel pre and has poor prep today.  If patient not intersted or can't get colonoscopy tomorrow will need constipation bowel prep and will need to restart his Eliquis today.Thanks,Chon     per Dr Lopez to hold Eliquis 2 days prior      COVID     CORONARY ANGIOPLASTY WITH STENT PLACEMENT  9/13/2006    IRRIGATION AND DEBRIDEMENT Right 8/30/2023    Procedure: IRRIGATION AND DEBRIDEMENT, RIGHT MIDDLE FINGER;  Surgeon: Martin Larsen MD;  Location: [facility] OR 2ND FLR;  Service: Orthopedics;  Laterality: Right;    KIDNEY TRANSPLANT  2005    PARATHYROIDECTOMY      TREATMENT OF CARDIAC ARRHYTHMIA N/A 3/28/2022    Procedure: CARDIOVERSION;  Surgeon: Migue Blunt MD;  Location: [facility] EP LAB;  Service: Cardiology;  Laterality: N/A;  AF, DCC, MAC, GP, 3 PREP*RODRIGO deferred pt 100% compliant*         Current Outpatient Medications:     acetaminophen (TYLENOL) 500 MG tablet, Take 1 tablet (500 mg total) by mouth every 6 (six) hours as needed for Pain., Disp: 20 tablet, Rfl: 0    albuterol (PROVENTIL) 2.5 mg /3 mL (0.083 %) nebulizer solution, Take 3 mLs (2.5 mg total) by nebulization every 6 (six) hours as needed for Wheezing. Rescue, Disp: 9 mL, Rfl: 6    albuterol (VENTOLIN HFA) 90 mcg/actuation inhaler, Inhale 2 puffs into the lungs every 6 (six) hours as needed for Wheezing or Shortness of Breath. Rescue, Disp: 18 g, Rfl: 3    amiodarone (PACERONE) 200 MG Tab, Take 1 tablet (200 mg total) by mouth once daily., Disp: 30 tablet, Rfl: 11    apixaban (ELIQUIS) 5 mg Tab, Take 1 tablet (5 mg total) by mouth 2 (two) times daily., Disp: 180 tablet, Rfl: 3    atorvastatin (LIPITOR) 40 MG tablet, Take 1 tablet (40 mg total) by mouth once daily., Disp: 90 tablet, Rfl: 3    b complex vitamins (B COMPLEX-VITAMIN B12) tablet, Take 1 tablet by mouth once daily., Disp: 90 tablet, Rfl: 3    calcium acetate,phosphat bind, (PHOSLO) 849  mg capsule, TAKE 1 CAPSULE BY MOUTH THREE TIMES A DAY WITH MEALS AND SNACKS, Disp: 360 capsule, Rfl: 1    calcium carbonate (CALCIUM 600 ORAL), Take 1 tablet by mouth 2 (two) times a day., Disp: , Rfl:     CHOLECALCIFEROL, VITAMIN D3, ORAL, Take 2 tablets by mouth 2 (two) times daily., Disp: , Rfl:     clopidogreL (PLAVIX) 75 mg tablet, Take 1 tablet (75 mg total) by mouth once daily., Disp: 30 tablet, Rfl: 11    doxazosin (CARDURA) 4 MG tablet, Take 1 tablet (4 mg total) by mouth every 12 (twelve) hours., Disp: 180 tablet, Rfl: 3    famotidine (PEPCID) 20 MG tablet, Take 1 tablet (20 mg total) by mouth 2 (two) times daily., Disp: 180 tablet, Rfl: 3    fexofenadine HCl (ALLEGRA ORAL), Take 1 tablet by mouth daily as needed (allergies)., Disp: , Rfl:     fluticasone propionate (FLONASE) 50 mcg/actuation nasal spray, 1 spray (50 mcg total) by Each Nostril route daily as needed for Allergies., Disp: , Rfl:     FLUZONE HIGHDOSE QUAD 23-24  mcg/0.7 mL Syrg, , Disp: , Rfl:     furosemide (LASIX) 20 MG tablet, One po QAM, and one po at lunchtime if swelling prn, Disp: 180 tablet, Rfl: 1    furosemide (LASIX) 80 MG tablet, Take 1 tablet (80 mg total) by mouth every morning., Disp: 90 tablet, Rfl: 1    gabapentin (NEURONTIN) 100 MG capsule, Take 1 capsule (100 mg total) by mouth as needed (pain)., Disp: , Rfl:     hydrALAZINE (APRESOLINE) 50 MG tablet, TAKE 1 TABLET (50 MG TOTAL) BY MOUTH 3 (THREE) TIMES DAILY., Disp: 270 tablet, Rfl: 3    isosorbide mononitrate (IMDUR) 30 MG 24 hr tablet, Take 2 tablets (60 mg total) by mouth once daily., Disp: 60 tablet, Rfl: 11    metoprolol succinate (TOPROL-XL) 25 MG 24 hr tablet, Take 1 tablet (25 mg total) by mouth once daily. (Patient taking differently: Take 12.5 mg by mouth 2 (two) times daily.), Disp: 90 tablet, Rfl: 3    MITIGARE 0.6 mg Cap, TAKE 1 CAPSULE BY MOUTH TWICE A DAY AS NEEDED GOUT FLARE UP TO 3 DAYS, Disp: 15 capsule, Rfl: 11    multivitamin (THERAGRAN) per  tablet, Take 1 tablet by mouth Daily., Disp: , Rfl:     NIFEdipine (PROCARDIA-XL) 60 MG (OSM) 24 hr tablet, Take 1 tablet (60 mg total) by mouth 2 (two) times a day., Disp: 180 tablet, Rfl: 3    nitroGLYCERIN (NITROSTAT) 0.4 MG SL tablet, Place 1 tablet (0.4 mg total) under the tongue every 5 (five) minutes as needed for Chest pain., Disp: 25 tablet, Rfl: 0    omeprazole (PRILOSEC) 20 MG capsule, Take 1 capsule (20 mg total) by mouth daily as needed (heartburn)., Disp: , Rfl:     paricalcitoL (ZEMPLAR) 1 MCG capsule, TAKE 1 CAPSULE ON MONDAY, WEDNESDAY AND FRIDAY, Disp: 36 capsule, Rfl: 3    PFIZER COVID BIVAL,12Y UP,,PF, 30 mcg/0.3 mL injection, Inject into the muscle., Disp: , Rfl:     potassium chloride (KLOR-CON) 10 MEQ TbSR, Take 1 tablet (10 mEq total) by mouth daily as needed (to be taken with lasix).,, Disp: 90 tablet, Rfl: 3    predniSONE (DELTASONE) 5 MG tablet, TAKE 1 TABLET BY MOUTH EVERY DAY IN THE MORNING, Disp: 90 tablet, Rfl: 3    pulse oximeter (PULSE OXIMETER) device, by Apply Externally route 2 (two) times a day. Use twice daily at 8 AM and 3 PM and record the value in MyChart as directed., Disp: 1 each, Rfl: 0    tacrolimus (PROGRAF) 1 MG Cap, Take 3 capsules (3 mg total) by mouth every 12 (twelve) hours., Disp: 180 capsule, Rfl: 11    fluticasone propion-salmeterol 115-21 mcg/dose (ADVAIR HFA) 115-21 mcg/actuation HFAA inhaler, Inhale 2 puffs into the lungs every 12 (twelve) hours. Controller (Patient taking differently: Inhale 2 puffs into the lungs 2 (two) times daily as needed (shortness of breath). Controller), Disp: 12 g, Rfl: 5    Review of patient's allergies indicates:   Allergen Reactions    Ace inhibitors Swelling    Verapamil Other (See Comments)     Other reaction(s): Unknown    Povidone-iodine Itching       Family History   Problem Relation Age of Onset    No Known Problems Sister     No Known Problems Brother     Thyroid disease Mother         s/p surgery    Heart disease Father          had pacemaker    No Known Problems Sister     Kidney failure Sister     Kidney disease Sister     No Known Problems Sister     Kidney disease Brother     Kidney failure Brother         s/p transplant    Diabetes Mellitus Neg Hx     Stroke Neg Hx     Heart attack Neg Hx     Cancer Neg Hx     Celiac disease Neg Hx     Cirrhosis Neg Hx     Colon cancer Neg Hx     Esophageal cancer Neg Hx     Inflammatory bowel disease Neg Hx     Rectal cancer Neg Hx     Stomach cancer Neg Hx     Ulcerative colitis Neg Hx     Liver disease Neg Hx     Liver cancer Neg Hx     Crohn's disease Neg Hx     Melanoma Neg Hx        Social History     Tobacco Use    Smoking status: Former     Current packs/day: 0.00     Average packs/day: 0.5 packs/day for 40.0 years (20.0 ttl pk-yrs)     Types: Cigarettes     Start date: 1982     Quit date: 2022     Years since quittin.7    Smokeless tobacco: Never    Tobacco comments:     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     Patient is currently retired   Substance Use Topics    Alcohol use: No    Drug use: No       REVIEW OF SYSTEMS:  General: No chills, fever, malaise, changes in weight  HEENT: No visual changes, difficulty hearing  Cardiovascular: No chest pain, palpitations, claudication  Pulmonary: No dyspnea, cough, wheezing  Gastrointestinal: No nausea, vomiting, diarrhea, constipation  Genitourinary: No dysuria, low urine output, hematuria  Endocrine: No polydipsia, polyphagia  Hematologic: No fatigue with exertion, pica, pallor  Musculoskeletal: No extremity or joint pain, no back pain, no difficulty with ambulation  Neurologic: no seizures, no headaches, no weakness  Psychiatric: no mood disturbance      PHYSICAL EXAM:   BP (!) 168/79 (BP Location: Right arm, Patient Position: Sitting, BP Method: Medium (Automatic))   Pulse 70   Temp 98.2 °F (36.8 °C) (Oral)   Ht 6' (1.829 m)   Wt 94 kg (207 lb 3.7 oz)   BMI 28.11 kg/m²    Constitutional:  Alert,   Well-appearing  In no distress.   Neurological: Normal speech  no focal findings  CN II - XII grossly intact.    Psychiatric: Mood and affect appropriate and symmetric.   HEENT: Normocephalic / atraumatic  PERRLA  Midline trachea  No scars across the neck   Cardiac: Regular rate and rhythm.   Pulmonary: Normal pulmonary effort.   Abdomen: Soft, not distended.     Skin: Warm and well perfused.    Vascular:  Right radial pulse 1 to 2+, left brachial pulse 3 +   Extremities/  Musculoskeletal: No edema.   Ballotable left antecubital and upper arm cephalic vein with tourniquet     IMAGING:  Vein mapping suggests adequate upper arm left cephalic vein 4.1 antecubital fossa 4.0 distal arm although smaller in mid arm at 2.4 proximal arm 2.3    IMPRESSION:  CKD 5 not yet on dialysis with failing 2nd kidney transplant.  He would strongly prefer autogenous fistula and hopefully his left cephalic vein will indeed be adequate    PLAN:  Left brachiocephalic AV fistula creation (versus graft if vein inadequate), December 18, 2023  Regional block    I will personally interrogate his fistula prior to beginning his surgery, and a vein is found to be inadequate for autogenous fistula would proceed with a prosthetic graft    I have explained the risks, benefits and alternatives for this procedure in detail.  The patient voices understanding and all questions have be answered, and agrees to proceed with the procedure.     INDIGO Melendez III, MD, FACS  Professor and Chief, Vascular and Endovascular Surgery

## 2023-11-22 ENCOUNTER — TELEPHONE (OUTPATIENT)
Dept: TRANSPLANT | Facility: CLINIC | Age: 68
End: 2023-11-22
Payer: MEDICARE

## 2023-11-23 ENCOUNTER — HOSPITAL ENCOUNTER (INPATIENT)
Facility: HOSPITAL | Age: 68
LOS: 1 days | Discharge: HOME | DRG: 280 | End: 2023-11-26
Attending: EMERGENCY MEDICINE | Admitting: EMERGENCY MEDICINE
Payer: MEDICARE

## 2023-11-23 DIAGNOSIS — Z94.0 H/O KIDNEY TRANSPLANT: Chronic | ICD-10-CM

## 2023-11-23 DIAGNOSIS — I50.32 CHRONIC DIASTOLIC HEART FAILURE: ICD-10-CM

## 2023-11-23 DIAGNOSIS — J44.9 COPD (CHRONIC OBSTRUCTIVE PULMONARY DISEASE): ICD-10-CM

## 2023-11-23 DIAGNOSIS — R06.02 SOB (SHORTNESS OF BREATH): ICD-10-CM

## 2023-11-23 DIAGNOSIS — N18.5 CKD (CHRONIC KIDNEY DISEASE) STAGE 5, GFR LESS THAN 15 ML/MIN: ICD-10-CM

## 2023-11-23 DIAGNOSIS — R07.9 CHEST PAIN: ICD-10-CM

## 2023-11-23 DIAGNOSIS — I50.33 ACUTE ON CHRONIC HEART FAILURE WITH PRESERVED EJECTION FRACTION: ICD-10-CM

## 2023-11-23 DIAGNOSIS — Z79.899 IMMUNOSUPPRESSIVE MANAGEMENT ENCOUNTER FOLLOWING KIDNEY TRANSPLANT: ICD-10-CM

## 2023-11-23 DIAGNOSIS — I50.9 ACUTE ON CHRONIC CONGESTIVE HEART FAILURE, UNSPECIFIED HEART FAILURE TYPE: Primary | ICD-10-CM

## 2023-11-23 DIAGNOSIS — R94.31 ST SEGMENT DEPRESSION: ICD-10-CM

## 2023-11-23 DIAGNOSIS — J43.8 OTHER EMPHYSEMA: ICD-10-CM

## 2023-11-23 DIAGNOSIS — Z94.0 IMMUNOSUPPRESSIVE MANAGEMENT ENCOUNTER FOLLOWING KIDNEY TRANSPLANT: ICD-10-CM

## 2023-11-23 DIAGNOSIS — J42 CHRONIC BRONCHITIS, UNSPECIFIED CHRONIC BRONCHITIS TYPE: ICD-10-CM

## 2023-11-23 DIAGNOSIS — J44.1 CHRONIC OBSTRUCTIVE PULMONARY DISEASE WITH ACUTE EXACERBATION: ICD-10-CM

## 2023-11-23 DIAGNOSIS — D63.1 ANEMIA DUE TO STAGE 5 CHRONIC KIDNEY DISEASE: ICD-10-CM

## 2023-11-23 DIAGNOSIS — N18.5 ANEMIA DUE TO STAGE 5 CHRONIC KIDNEY DISEASE: ICD-10-CM

## 2023-11-23 PROBLEM — N17.9 ACUTE RENAL FAILURE SUPERIMPOSED ON STAGE 4 CHRONIC KIDNEY DISEASE: Status: RESOLVED | Noted: 2023-03-17 | Resolved: 2023-11-23

## 2023-11-23 PROBLEM — N18.4 ACUTE RENAL FAILURE SUPERIMPOSED ON STAGE 4 CHRONIC KIDNEY DISEASE: Status: RESOLVED | Noted: 2023-03-17 | Resolved: 2023-11-23

## 2023-11-23 LAB
ALBUMIN SERPL BCP-MCNC: 3 G/DL (ref 3.5–5.2)
ALP SERPL-CCNC: 60 U/L (ref 55–135)
ALT SERPL W/O P-5'-P-CCNC: 21 U/L (ref 10–44)
ANION GAP SERPL CALC-SCNC: 12 MMOL/L (ref 8–16)
AST SERPL-CCNC: 25 U/L (ref 10–40)
BASOPHILS # BLD AUTO: 0.02 K/UL (ref 0–0.2)
BASOPHILS NFR BLD: 0.2 % (ref 0–1.9)
BILIRUB SERPL-MCNC: 0.4 MG/DL (ref 0.1–1)
BNP SERPL-MCNC: 1718 PG/ML (ref 0–99)
BUN SERPL-MCNC: 41 MG/DL (ref 8–23)
CALCIUM SERPL-MCNC: 9.3 MG/DL (ref 8.7–10.5)
CHLORIDE SERPL-SCNC: 110 MMOL/L (ref 95–110)
CO2 SERPL-SCNC: 22 MMOL/L (ref 23–29)
CREAT SERPL-MCNC: 5.6 MG/DL (ref 0.5–1.4)
DIFFERENTIAL METHOD BLD: ABNORMAL
EOSINOPHIL # BLD AUTO: 0.5 K/UL (ref 0–0.5)
EOSINOPHIL NFR BLD: 5.8 % (ref 0–8)
ERYTHROCYTE [DISTWIDTH] IN BLOOD BY AUTOMATED COUNT: 18.1 % (ref 11.5–14.5)
EST. GFR  (NO RACE VARIABLE): 10.3 ML/MIN/1.73 M^2
ESTIMATED AVG GLUCOSE: 97 MG/DL (ref 68–131)
FERRITIN SERPL-MCNC: 574 NG/ML (ref 20–300)
GLUCOSE SERPL-MCNC: 109 MG/DL (ref 70–110)
HBA1C MFR BLD: 5 % (ref 4–5.6)
HCT VFR BLD AUTO: 26.2 % (ref 40–54)
HGB BLD-MCNC: 8 G/DL (ref 14–18)
HIV 1+2 AB+HIV1 P24 AG SERPL QL IA: NORMAL
IMM GRANULOCYTES # BLD AUTO: 0.02 K/UL (ref 0–0.04)
IMM GRANULOCYTES NFR BLD AUTO: 0.2 % (ref 0–0.5)
INFLUENZA A, MOLECULAR: NOT DETECTED
INFLUENZA B, MOLECULAR: NOT DETECTED
IRON SERPL-MCNC: 11 UG/DL (ref 45–160)
LYMPHOCYTES # BLD AUTO: 0.8 K/UL (ref 1–4.8)
LYMPHOCYTES NFR BLD: 9.2 % (ref 18–48)
MCH RBC QN AUTO: 25 PG (ref 27–31)
MCHC RBC AUTO-ENTMCNC: 30.5 G/DL (ref 32–36)
MCV RBC AUTO: 82 FL (ref 82–98)
MONOCYTES # BLD AUTO: 0.7 K/UL (ref 0.3–1)
MONOCYTES NFR BLD: 7.9 % (ref 4–15)
NEUTROPHILS # BLD AUTO: 6.4 K/UL (ref 1.8–7.7)
NEUTROPHILS NFR BLD: 76.7 % (ref 38–73)
NRBC BLD-RTO: 0 /100 WBC
PLATELET # BLD AUTO: 140 K/UL (ref 150–450)
PMV BLD AUTO: ABNORMAL FL (ref 9.2–12.9)
POTASSIUM SERPL-SCNC: 4.1 MMOL/L (ref 3.5–5.1)
PROT SERPL-MCNC: 6.6 G/DL (ref 6–8.4)
RBC # BLD AUTO: 3.2 M/UL (ref 4.6–6.2)
RSV AG BY MOLECULAR METHOD: NOT DETECTED
SARS-COV-2 RNA RESP QL NAA+PROBE: NOT DETECTED
SATURATED IRON: 6 % (ref 20–50)
SODIUM SERPL-SCNC: 144 MMOL/L (ref 136–145)
TACROLIMUS BLD-MCNC: 2.8 NG/ML (ref 5–15)
TOTAL IRON BINDING CAPACITY: 188 UG/DL (ref 250–450)
TRANSFERRIN SERPL-MCNC: 127 MG/DL (ref 200–375)
TROPONIN I SERPL DL<=0.01 NG/ML-MCNC: 0.06 NG/ML (ref 0–0.03)
TROPONIN I SERPL DL<=0.01 NG/ML-MCNC: 0.07 NG/ML (ref 0–0.03)
TROPONIN I SERPL DL<=0.01 NG/ML-MCNC: 0.07 NG/ML (ref 0–0.03)
TROPONIN I SERPL DL<=0.01 NG/ML-MCNC: 0.08 NG/ML (ref 0–0.03)
TROPONIN I SERPL DL<=0.01 NG/ML-MCNC: 0.09 NG/ML (ref 0–0.03)
WBC # BLD AUTO: 8.33 K/UL (ref 3.9–12.7)

## 2023-11-23 PROCEDURE — G0378 HOSPITAL OBSERVATION PER HR: HCPCS

## 2023-11-23 PROCEDURE — 63600175 PHARM REV CODE 636 W HCPCS

## 2023-11-23 PROCEDURE — 93010 ELECTROCARDIOGRAM REPORT: CPT | Mod: 76,,, | Performed by: INTERNAL MEDICINE

## 2023-11-23 PROCEDURE — 94761 N-INVAS EAR/PLS OXIMETRY MLT: CPT

## 2023-11-23 PROCEDURE — 84484 ASSAY OF TROPONIN QUANT: CPT | Mod: 91 | Performed by: NURSE PRACTITIONER

## 2023-11-23 PROCEDURE — 99285 EMERGENCY DEPT VISIT HI MDM: CPT | Mod: 25

## 2023-11-23 PROCEDURE — 96374 THER/PROPH/DIAG INJ IV PUSH: CPT

## 2023-11-23 PROCEDURE — 94640 AIRWAY INHALATION TREATMENT: CPT | Mod: XB

## 2023-11-23 PROCEDURE — 99215 OFFICE O/P EST HI 40 MIN: CPT | Mod: ,,, | Performed by: INTERNAL MEDICINE

## 2023-11-23 PROCEDURE — 27000221 HC OXYGEN, UP TO 24 HOURS

## 2023-11-23 PROCEDURE — 25000242 PHARM REV CODE 250 ALT 637 W/ HCPCS

## 2023-11-23 PROCEDURE — 25000003 PHARM REV CODE 250

## 2023-11-23 PROCEDURE — 80053 COMPREHEN METABOLIC PANEL: CPT

## 2023-11-23 PROCEDURE — 84484 ASSAY OF TROPONIN QUANT: CPT | Mod: 91

## 2023-11-23 PROCEDURE — 93005 ELECTROCARDIOGRAM TRACING: CPT

## 2023-11-23 PROCEDURE — 93010 ELECTROCARDIOGRAM REPORT: CPT | Mod: ,,, | Performed by: INTERNAL MEDICINE

## 2023-11-23 PROCEDURE — 94640 AIRWAY INHALATION TREATMENT: CPT

## 2023-11-23 PROCEDURE — 83036 HEMOGLOBIN GLYCOSYLATED A1C: CPT

## 2023-11-23 PROCEDURE — 87389 HIV-1 AG W/HIV-1&-2 AB AG IA: CPT | Performed by: PHYSICIAN ASSISTANT

## 2023-11-23 PROCEDURE — 83540 ASSAY OF IRON: CPT

## 2023-11-23 PROCEDURE — 82728 ASSAY OF FERRITIN: CPT

## 2023-11-23 PROCEDURE — 96376 TX/PRO/DX INJ SAME DRUG ADON: CPT

## 2023-11-23 PROCEDURE — 36415 COLL VENOUS BLD VENIPUNCTURE: CPT

## 2023-11-23 PROCEDURE — 36415 COLL VENOUS BLD VENIPUNCTURE: CPT | Performed by: NURSE PRACTITIONER

## 2023-11-23 PROCEDURE — 85025 COMPLETE CBC W/AUTO DIFF WBC: CPT

## 2023-11-23 PROCEDURE — 0241U SARS-COV2 (COVID) WITH FLU/RSV BY PCR: CPT

## 2023-11-23 PROCEDURE — 99900035 HC TECH TIME PER 15 MIN (STAT)

## 2023-11-23 PROCEDURE — 83880 ASSAY OF NATRIURETIC PEPTIDE: CPT

## 2023-11-23 PROCEDURE — 84484 ASSAY OF TROPONIN QUANT: CPT

## 2023-11-23 PROCEDURE — 80197 ASSAY OF TACROLIMUS: CPT

## 2023-11-23 RX ORDER — POLYETHYLENE GLYCOL 3350 17 G/17G
17 POWDER, FOR SOLUTION ORAL 2 TIMES DAILY PRN
Status: DISCONTINUED | OUTPATIENT
Start: 2023-11-23 | End: 2023-11-26 | Stop reason: HOSPADM

## 2023-11-23 RX ORDER — ATORVASTATIN CALCIUM 40 MG/1
40 TABLET, FILM COATED ORAL DAILY
Status: DISCONTINUED | OUTPATIENT
Start: 2023-11-23 | End: 2023-11-24

## 2023-11-23 RX ORDER — GUAIFENESIN 600 MG/1
600 TABLET, EXTENDED RELEASE ORAL 2 TIMES DAILY
Status: DISCONTINUED | OUTPATIENT
Start: 2023-11-23 | End: 2023-11-26 | Stop reason: HOSPADM

## 2023-11-23 RX ORDER — NIFEDIPINE 60 MG/1
60 TABLET, EXTENDED RELEASE ORAL 2 TIMES DAILY
Status: DISCONTINUED | OUTPATIENT
Start: 2023-11-23 | End: 2023-11-26 | Stop reason: HOSPADM

## 2023-11-23 RX ORDER — HYDRALAZINE HYDROCHLORIDE 50 MG/1
50 TABLET, FILM COATED ORAL 3 TIMES DAILY
Status: DISCONTINUED | OUTPATIENT
Start: 2023-11-23 | End: 2023-11-26 | Stop reason: HOSPADM

## 2023-11-23 RX ORDER — FUROSEMIDE 40 MG/1
80 TABLET ORAL EVERY MORNING
Status: DISCONTINUED | OUTPATIENT
Start: 2023-11-23 | End: 2023-11-23

## 2023-11-23 RX ORDER — SIMETHICONE 80 MG
1 TABLET,CHEWABLE ORAL 4 TIMES DAILY PRN
Status: DISCONTINUED | OUTPATIENT
Start: 2023-11-23 | End: 2023-11-26 | Stop reason: HOSPADM

## 2023-11-23 RX ORDER — MAG HYDROX/ALUMINUM HYD/SIMETH 200-200-20
30 SUSPENSION, ORAL (FINAL DOSE FORM) ORAL 4 TIMES DAILY PRN
Status: DISCONTINUED | OUTPATIENT
Start: 2023-11-23 | End: 2023-11-26 | Stop reason: HOSPADM

## 2023-11-23 RX ORDER — IPRATROPIUM BROMIDE AND ALBUTEROL SULFATE 2.5; .5 MG/3ML; MG/3ML
3 SOLUTION RESPIRATORY (INHALATION)
Status: DISCONTINUED | OUTPATIENT
Start: 2023-11-23 | End: 2023-11-26 | Stop reason: HOSPADM

## 2023-11-23 RX ORDER — FUROSEMIDE 10 MG/ML
80 INJECTION INTRAMUSCULAR; INTRAVENOUS 2 TIMES DAILY
Status: DISCONTINUED | OUTPATIENT
Start: 2023-11-23 | End: 2023-11-24

## 2023-11-23 RX ORDER — ACETAMINOPHEN 325 MG/1
650 TABLET ORAL EVERY 8 HOURS PRN
Status: DISCONTINUED | OUTPATIENT
Start: 2023-11-23 | End: 2023-11-26 | Stop reason: HOSPADM

## 2023-11-23 RX ORDER — PREDNISONE 5 MG/1
5 TABLET ORAL DAILY
Status: DISCONTINUED | OUTPATIENT
Start: 2023-11-24 | End: 2023-11-26 | Stop reason: HOSPADM

## 2023-11-23 RX ORDER — SODIUM CHLORIDE 0.9 % (FLUSH) 0.9 %
10 SYRINGE (ML) INJECTION
Status: DISCONTINUED | OUTPATIENT
Start: 2023-11-23 | End: 2023-11-26 | Stop reason: HOSPADM

## 2023-11-23 RX ORDER — ISOSORBIDE MONONITRATE 60 MG/1
60 TABLET, EXTENDED RELEASE ORAL DAILY
Status: DISCONTINUED | OUTPATIENT
Start: 2023-11-23 | End: 2023-11-24

## 2023-11-23 RX ORDER — PREDNISONE 20 MG/1
60 TABLET ORAL
Status: COMPLETED | OUTPATIENT
Start: 2023-11-23 | End: 2023-11-23

## 2023-11-23 RX ORDER — DEXTROSE 40 %
16 GEL (GRAM) ORAL
Status: DISCONTINUED | OUTPATIENT
Start: 2023-11-23 | End: 2023-11-26 | Stop reason: HOSPADM

## 2023-11-23 RX ORDER — FUROSEMIDE 10 MG/ML
40 INJECTION INTRAMUSCULAR; INTRAVENOUS ONCE
Status: COMPLETED | OUTPATIENT
Start: 2023-11-23 | End: 2023-11-23

## 2023-11-23 RX ORDER — IBUPROFEN 200 MG
24 TABLET ORAL
Status: DISCONTINUED | OUTPATIENT
Start: 2023-11-23 | End: 2023-11-23

## 2023-11-23 RX ORDER — ACETAMINOPHEN 325 MG/1
650 TABLET ORAL EVERY 4 HOURS PRN
Status: DISCONTINUED | OUTPATIENT
Start: 2023-11-23 | End: 2023-11-26 | Stop reason: HOSPADM

## 2023-11-23 RX ORDER — TALC
6 POWDER (GRAM) TOPICAL NIGHTLY PRN
Status: DISCONTINUED | OUTPATIENT
Start: 2023-11-23 | End: 2023-11-26 | Stop reason: HOSPADM

## 2023-11-23 RX ORDER — NALOXONE HCL 0.4 MG/ML
0.02 VIAL (ML) INJECTION
Status: DISCONTINUED | OUTPATIENT
Start: 2023-11-23 | End: 2023-11-26 | Stop reason: HOSPADM

## 2023-11-23 RX ORDER — FUROSEMIDE 10 MG/ML
80 INJECTION INTRAMUSCULAR; INTRAVENOUS 2 TIMES DAILY
Status: DISCONTINUED | OUTPATIENT
Start: 2023-11-23 | End: 2023-11-23

## 2023-11-23 RX ORDER — ONDANSETRON 2 MG/ML
4 INJECTION INTRAMUSCULAR; INTRAVENOUS EVERY 8 HOURS PRN
Status: DISCONTINUED | OUTPATIENT
Start: 2023-11-23 | End: 2023-11-26 | Stop reason: HOSPADM

## 2023-11-23 RX ORDER — ONDANSETRON 8 MG/1
8 TABLET, ORALLY DISINTEGRATING ORAL EVERY 8 HOURS PRN
Status: DISCONTINUED | OUTPATIENT
Start: 2023-11-23 | End: 2023-11-26 | Stop reason: HOSPADM

## 2023-11-23 RX ORDER — AMIODARONE HYDROCHLORIDE 200 MG/1
200 TABLET ORAL DAILY
Status: DISCONTINUED | OUTPATIENT
Start: 2023-11-23 | End: 2023-11-26 | Stop reason: HOSPADM

## 2023-11-23 RX ORDER — DOXAZOSIN 4 MG/1
4 TABLET ORAL EVERY 12 HOURS
Status: DISCONTINUED | OUTPATIENT
Start: 2023-11-23 | End: 2023-11-26 | Stop reason: HOSPADM

## 2023-11-23 RX ORDER — CLOPIDOGREL BISULFATE 75 MG/1
75 TABLET ORAL DAILY
Status: DISCONTINUED | OUTPATIENT
Start: 2023-11-23 | End: 2023-11-26 | Stop reason: HOSPADM

## 2023-11-23 RX ORDER — FLUTICASONE FUROATE AND VILANTEROL 100; 25 UG/1; UG/1
1 POWDER RESPIRATORY (INHALATION) DAILY
Status: DISCONTINUED | OUTPATIENT
Start: 2023-11-23 | End: 2023-11-26 | Stop reason: HOSPADM

## 2023-11-23 RX ORDER — GLUCAGON 1 MG
1 KIT INJECTION
Status: DISCONTINUED | OUTPATIENT
Start: 2023-11-23 | End: 2023-11-26 | Stop reason: HOSPADM

## 2023-11-23 RX ORDER — ASPIRIN 325 MG
325 TABLET ORAL
Status: COMPLETED | OUTPATIENT
Start: 2023-11-23 | End: 2023-11-23

## 2023-11-23 RX ORDER — TACROLIMUS 1 MG/1
3 CAPSULE ORAL 2 TIMES DAILY
Status: DISCONTINUED | OUTPATIENT
Start: 2023-11-23 | End: 2023-11-26 | Stop reason: HOSPADM

## 2023-11-23 RX ORDER — NITROGLYCERIN 0.4 MG/1
0.4 TABLET SUBLINGUAL EVERY 5 MIN PRN
Status: DISCONTINUED | OUTPATIENT
Start: 2023-11-23 | End: 2023-11-26 | Stop reason: HOSPADM

## 2023-11-23 RX ORDER — IPRATROPIUM BROMIDE AND ALBUTEROL SULFATE 2.5; .5 MG/3ML; MG/3ML
3 SOLUTION RESPIRATORY (INHALATION)
Status: COMPLETED | OUTPATIENT
Start: 2023-11-23 | End: 2023-11-23

## 2023-11-23 RX ORDER — DEXTROSE 40 %
24 GEL (GRAM) ORAL
Status: DISCONTINUED | OUTPATIENT
Start: 2023-11-23 | End: 2023-11-26 | Stop reason: HOSPADM

## 2023-11-23 RX ORDER — IBUPROFEN 200 MG
16 TABLET ORAL
Status: DISCONTINUED | OUTPATIENT
Start: 2023-11-23 | End: 2023-11-23

## 2023-11-23 RX ADMIN — DOXAZOSIN 4 MG: 4 TABLET ORAL at 08:11

## 2023-11-23 RX ADMIN — NIFEDIPINE 60 MG: 60 TABLET, FILM COATED, EXTENDED RELEASE ORAL at 08:11

## 2023-11-23 RX ADMIN — FUROSEMIDE 80 MG: 40 TABLET ORAL at 08:11

## 2023-11-23 RX ADMIN — IPRATROPIUM BROMIDE AND ALBUTEROL SULFATE 3 ML: .5; 3 SOLUTION RESPIRATORY (INHALATION) at 03:11

## 2023-11-23 RX ADMIN — TACROLIMUS 3 MG: 1 CAPSULE ORAL at 05:11

## 2023-11-23 RX ADMIN — FUROSEMIDE 40 MG: 10 INJECTION, SOLUTION INTRAVENOUS at 10:11

## 2023-11-23 RX ADMIN — FUROSEMIDE 80 MG: 10 INJECTION, SOLUTION INTRAVENOUS at 08:11

## 2023-11-23 RX ADMIN — GUAIFENESIN 600 MG: 600 TABLET, EXTENDED RELEASE ORAL at 08:11

## 2023-11-23 RX ADMIN — HYDRALAZINE HYDROCHLORIDE 50 MG: 50 TABLET ORAL at 04:11

## 2023-11-23 RX ADMIN — ASPIRIN 325 MG ORAL TABLET 325 MG: 325 PILL ORAL at 12:11

## 2023-11-23 RX ADMIN — GUAIFENESIN 600 MG: 600 TABLET, EXTENDED RELEASE ORAL at 04:11

## 2023-11-23 RX ADMIN — APIXABAN 5 MG: 5 TABLET, FILM COATED ORAL at 08:11

## 2023-11-23 RX ADMIN — IPRATROPIUM BROMIDE AND ALBUTEROL SULFATE 3 ML: .5; 3 SOLUTION RESPIRATORY (INHALATION) at 08:11

## 2023-11-23 RX ADMIN — CLOPIDOGREL BISULFATE 75 MG: 75 TABLET ORAL at 08:11

## 2023-11-23 RX ADMIN — NITROGLYCERIN 0.4 MG: 0.4 TABLET, ORALLY DISINTEGRATING SUBLINGUAL at 09:11

## 2023-11-23 RX ADMIN — METOPROLOL SUCCINATE 12.5 MG: 25 TABLET, EXTENDED RELEASE ORAL at 08:11

## 2023-11-23 RX ADMIN — AMIODARONE HYDROCHLORIDE 200 MG: 200 TABLET ORAL at 08:11

## 2023-11-23 RX ADMIN — ATORVASTATIN CALCIUM 40 MG: 40 TABLET, FILM COATED ORAL at 08:11

## 2023-11-23 RX ADMIN — HYDRALAZINE HYDROCHLORIDE 50 MG: 50 TABLET ORAL at 08:11

## 2023-11-23 RX ADMIN — ISOSORBIDE MONONITRATE 60 MG: 60 TABLET, EXTENDED RELEASE ORAL at 08:11

## 2023-11-23 RX ADMIN — TACROLIMUS 3 MG: 1 CAPSULE ORAL at 08:11

## 2023-11-23 RX ADMIN — PREDNISONE 60 MG: 20 TABLET ORAL at 08:11

## 2023-11-23 RX ADMIN — FLUTICASONE FUROATE AND VILANTEROL TRIFENATATE 1 PUFF: 100; 25 POWDER RESPIRATORY (INHALATION) at 08:11

## 2023-11-23 RX ADMIN — NIFEDIPINE 60 MG: 60 TABLET, FILM COATED, EXTENDED RELEASE ORAL at 10:11

## 2023-11-23 RX ADMIN — IPRATROPIUM BROMIDE AND ALBUTEROL SULFATE 3 ML: .5; 3 SOLUTION RESPIRATORY (INHALATION) at 07:11

## 2023-11-23 NOTE — PLAN OF CARE
11/23/23 1038   Readmission   Why were you hospitalized in the last 30 days? Acute renal failure superimposed on stage 4 chronic kidney disease   Why were you readmitted? New medical problem   When you left the hospital how did you feel? fine   When you left the hospital where did you go? Home with Family   Did patient/caregiver refused recommended DC plan? No   Tell me about what happened between when you left the hospital and the day you returned. started to feel unwell   Did you try to manage your symptoms your self? Yes   What did you do? tried to drink less and urinate more   Did you have  a follow-up appointment on discharge? Yes   Did you go? Yes   Was this a planned readmission? No     Pt stated that he was starting to feel unwell and it was unrelated to the previous admission.  Pt stated he was having shortness of breath and trouble breathing and thought it was fluid overload.  Pt stated he tried to drink less and urinate more to resolve the symptoms, but it did not work.      Pt stated he did have a PCP follow-up appointment after last d/c and he did attend.      Denia Crowder, BAILEY, MSW, LMSW, RSW   Case Management  [facility]  Email: [email or web address]

## 2023-11-23 NOTE — ED NOTES
Telemetry Verification   Patient placed on Telemetry Box  Verified by   Box # [id number]   Monitor Tech    Rate    Rhythm

## 2023-11-23 NOTE — ASSESSMENT & PLAN NOTE
Patient with known CAD s/p stent placement, which is controlled Will continue ASA, Plavix, and Statin and monitor for S/Sx of angina/ACS. Continue to monitor on telemetry.

## 2023-11-23 NOTE — Clinical Note
Diagnosis: Acute on chronic congestive heart failure, unspecified heart failure type [[id number]]   Future Attending Provider: KANDI PANDA [[id number]]   Admitting Provider:: JARROD JANSEN [[id number]]

## 2023-11-23 NOTE — HPI
"Ibrahima Phelps is a 68 y.o. male with a PMH of HFpEF, COPD, a-fib, CKD s/p renal transplant, CAD who presented to the ED with complaints of SOB and CP. States that he has had a worsening cough and SOB x 3 days. The cough is nonproductive. The CP and SOB are worse when he get "agitated". He has not missed any doses of lasix recently. He is unsure if the SOB is due to his COPD or CHF. Denies fevers, chills, runny nose, nausea, vomiting, diarrhea or recent illness exposure.     ED: Hypertensive and tachypneic on arrival. CBC without leukocytosis but with chronic stable anemia. Cr 5.6 (baseline). BNP elevated to 1718. Troponin 0.064 >> 0.076. EKG in NSR with LVH. CXR with improved but persistent increased parenchymal opacification at the bases. Given 40 of IV lasix, 3 dounebs and 60mg of prednisone with improvement. Patient was admitted to hospital medicine for further care.   "

## 2023-11-23 NOTE — ED TRIAGE NOTES
Patient identifiers for Ibrahima Phelps 68 y.o. male checked and correct.  Chief Complaint   Patient presents with    Shortness of Breath     Cough , kidney xplant 2005     Past Medical History:   Diagnosis Date    Atrial fibrillation     CAD (coronary artery disease) 2006    MI in 2006    CHF (congestive heart failure) 2017    CKD (chronic kidney disease) stage 3, GFR 30-59 ml/min     Dr. Latif.  in transplanted kidney    COVID-19 2/7/2023    Diverticulosis     Hyperlipidemia     Hypertension     Keloid cicatrix     Metabolic bone disease     Other emphysema 10/1/2019    Pericarditis     S/P kidney transplant 1992    x2 (1992 & 2005),    Thrombocytopenia     Thyroid disease     Tobacco use 09/05/2014     Allergies reported:   Review of patient's allergies indicates:   Allergen Reactions    Ace inhibitors Swelling    Verapamil Other (See Comments)     Other reaction(s): Unknown    Povidone-iodine Itching         LOC: Patient is awake, alert, and aware of environment with an appropriate affect. Patient is oriented x 4 and speaking appropriately.  APPEARANCE: Patient resting comfortably and in no acute distress. Patient is clean and well groomed, patient's clothing is properly fastened.  SKIN: The skin is warm and dry. Patient has normal skin turgor and moist mucus membranes.   MUSKULOSKELETAL: Patient is moving all extremities well, no obvious deformities noted. Pulses intact. Reports generalized pain/chills  RESPIRATORY: Airway is open and patent. Respirations are spontaneous and non-labored with normal effort and rate. Reports SOB with a cough  CARDIAC: Patient has a normal rate and rhythm. Normal sinus on cardiac monitor. No peripheral edema noted.   ABDOMEN: No distention noted. Soft and non-tender upon palpation.  NEUROLOGICAL:  PERRL. Facial expression is symmetrical. Hand grasps are equal bilaterally. Normal sensation in all extremities when touched with finger.

## 2023-11-23 NOTE — ED NOTES
Pt states relief of chest pain, states supplemental oxygen helped, pt declines nitroglycerin sublingual at this time

## 2023-11-23 NOTE — NURSING
Nurses Note -- 4 Eyes      11/23/2023   4:24 PM      Skin assessed during: Admit      [x] No Altered Skin Integrity Present    []Prevention Measures Documented      [] Yes- Altered Skin Integrity Present or Discovered   [] LDA Added if Not in Epic (Describe Wound)   [] New Altered Skin Integrity was Present on Admit and Documented in LDA   [] Wound Image Taken    Wound Care Consulted? No    Attending Nurse:  Tere Underwood RN/Staff Member:   Wendi PENG

## 2023-11-23 NOTE — ASSESSMENT & PLAN NOTE
Patient with Paroxysmal (<7 days) atrial fibrillation which is controlled currently with Beta Blocker and Amiodarone. Patient is currently in sinus rhythm.[id number] Score: 2. Anticoagulation indicated. Anticoagulation done with eliquis .

## 2023-11-23 NOTE — SUBJECTIVE & OBJECTIVE
"Past Medical History:   Diagnosis Date    Atrial fibrillation     CAD (coronary artery disease) 2006    MI in 2006    CHF (congestive heart failure) 2017    CKD (chronic kidney disease) stage 3, GFR 30-59 ml/min     Dr. Latif.  in transplanted kidney    COVID-19 2/7/2023    Diverticulosis     Hyperlipidemia     Hypertension     Keloid cicatrix     Metabolic bone disease     Other emphysema 10/1/2019    Pericarditis     S/P kidney transplant 1992    x2 (1992 & 2005),    Thrombocytopenia     Thyroid disease     Tobacco use 09/05/2014       Past Surgical History:   Procedure Laterality Date    CARDIOVERSION  04/30/15    CHOLECYSTECTOMY      COLONOSCOPY  April 20, 2011    Diverticulosis, repeat recommended in 3 yrs., repeat colonoscopy 2014 revealed 2 polyps.  He should return in 5 years.    COLONOSCOPY N/A 3/13/2020    Procedure: COLONOSCOPY;  Surgeon: Chon Casper MD;  Location: [facility] (4TH FLR);  Service: Endoscopy;  Laterality: N/A;  ok to hold coumadin x5days-see telephone encounter 2/4/20-tb    COLONOSCOPY N/A 2/4/2021    Procedure: COLONOSCOPY;  Surgeon: Chon Casper MD;  Location: [facility] (4TH FLR);  Service: Endoscopy;  Laterality: N/A;  Eliquis - per Dr. GAVIN Blunt ok to hold x 2 days and "restarted asap"- ERW  last prep "poor", [id number] ordered/ Pt requests Dr. Casper  prep ins. mailed - COVID screening on 2/1/21 PCW- ERW    COLONOSCOPY N/A 3/3/2021    Procedure: COLONOSCOPY;  Surgeon: Chon Casper MD;  Location: [facility] (4TH FLR);  Service: Endoscopy;  Laterality: N/A;  Patient with his second poor bowel pre and has poor prep today.  If patient not intersted or can't get colonoscopy tomorrow will need constipation bowel prep and will need to restart his Eliquis today.Thanks,Chon     per Dr Blunt-ok to hold Eliquis 2 days prior      COVID     CORONARY ANGIOPLASTY WITH STENT PLACEMENT  9/13/2006    IRRIGATION AND DEBRIDEMENT Right 8/30/2023    Procedure: IRRIGATION AND DEBRIDEMENT, " RIGHT MIDDLE FINGER;  Surgeon: Martin Larsen MD;  Location: [facility] FLR;  Service: Orthopedics;  Laterality: Right;    KIDNEY TRANSPLANT  2005    PARATHYROIDECTOMY      TREATMENT OF CARDIAC ARRHYTHMIA N/A 3/28/2022    Procedure: CARDIOVERSION;  Surgeon: Migue Blunt MD;  Location: [facility] EP LAB;  Service: Cardiology;  Laterality: N/A;  AF, DCC, MAC, GP, 3 PREP*RODRIGO deferred pt 100% compliant*       Review of patient's allergies indicates:   Allergen Reactions    Ace inhibitors Swelling    Verapamil Other (See Comments)     Other reaction(s): Unknown    Povidone-iodine Itching       No current facility-administered medications on file prior to encounter.     Current Outpatient Medications on File Prior to Encounter   Medication Sig    acetaminophen (TYLENOL) 500 MG tablet Take 1 tablet (500 mg total) by mouth every 6 (six) hours as needed for Pain.    albuterol (PROVENTIL) 2.5 mg /3 mL (0.083 %) nebulizer solution Take 3 mLs (2.5 mg total) by nebulization every 6 (six) hours as needed for Wheezing. Rescue    albuterol (VENTOLIN HFA) 90 mcg/actuation inhaler Inhale 2 puffs into the lungs every 6 (six) hours as needed for Wheezing or Shortness of Breath. Rescue    amiodarone (PACERONE) 200 MG Tab Take 1 tablet (200 mg total) by mouth once daily.    apixaban (ELIQUIS) 5 mg Tab Take 1 tablet (5 mg total) by mouth 2 (two) times daily.    atorvastatin (LIPITOR) 40 MG tablet Take 1 tablet (40 mg total) by mouth once daily.    b complex vitamins (B COMPLEX-VITAMIN B12) tablet Take 1 tablet by mouth once daily.    calcium acetate,phosphat bind, (PHOSLO) 667 mg capsule TAKE 1 CAPSULE BY MOUTH THREE TIMES A DAY WITH MEALS AND SNACKS    calcium carbonate (CALCIUM 600 ORAL) Take 1 tablet by mouth 2 (two) times a day.    CHOLECALCIFEROL, VITAMIN D3, ORAL Take 2 tablets by mouth 2 (two) times daily.    clopidogreL (PLAVIX) 75 mg tablet Take 1 tablet (75 mg total) by mouth once daily.    doxazosin (CARDURA) 4 MG  tablet Take 1 tablet (4 mg total) by mouth every 12 (twelve) hours.    famotidine (PEPCID) 20 MG tablet Take 1 tablet (20 mg total) by mouth 2 (two) times daily.    fexofenadine HCl (ALLEGRA ORAL) Take 1 tablet by mouth daily as needed (allergies).    fluticasone propionate (FLONASE) 50 mcg/actuation nasal spray 1 spray (50 mcg total) by Each Nostril route daily as needed for Allergies.    FLUZONE HIGHDOSE QUAD 23-24  mcg/0.7 mL Syrg     furosemide (LASIX) 20 MG tablet One po QAM, and one po at lunchtime if swelling prn    furosemide (LASIX) 80 MG tablet Take 1 tablet (80 mg total) by mouth every morning.    gabapentin (NEURONTIN) 100 MG capsule Take 1 capsule (100 mg total) by mouth as needed (pain).    hydrALAZINE (APRESOLINE) 50 MG tablet TAKE 1 TABLET (50 MG TOTAL) BY MOUTH 3 (THREE) TIMES DAILY.    isosorbide mononitrate (IMDUR) 30 MG 24 hr tablet Take 2 tablets (60 mg total) by mouth once daily.    metoprolol succinate (TOPROL-XL) 25 MG 24 hr tablet Take 1 tablet (25 mg total) by mouth once daily. (Patient taking differently: Take 12.5 mg by mouth 2 (two) times daily.)    MITIGARE 0.6 mg Cap TAKE 1 CAPSULE BY MOUTH TWICE A DAY AS NEEDED GOUT FLARE UP TO 3 DAYS    multivitamin (THERAGRAN) per tablet Take 1 tablet by mouth Daily.    NIFEdipine (PROCARDIA-XL) 60 MG (OSM) 24 hr tablet Take 1 tablet (60 mg total) by mouth 2 (two) times a day.    nitroGLYCERIN (NITROSTAT) 0.4 MG SL tablet Place 1 tablet (0.4 mg total) under the tongue every 5 (five) minutes as needed for Chest pain.    omeprazole (PRILOSEC) 20 MG capsule Take 1 capsule (20 mg total) by mouth daily as needed (heartburn).    paricalcitoL (ZEMPLAR) 1 MCG capsule TAKE 1 CAPSULE ON MONDAY, WEDNESDAY AND FRIDAY    PFIZER COVID BIVAL,12Y UP,,PF, 30 mcg/0.3 mL injection Inject into the muscle.    potassium chloride (KLOR-CON) 10 MEQ TbSR Take 1 tablet (10 mEq total) by mouth daily as needed (to be taken with lasix).,    predniSONE (DELTASONE) 5 MG  tablet TAKE 1 TABLET BY MOUTH EVERY DAY IN THE MORNING    pulse oximeter (PULSE OXIMETER) device by Apply Externally route 2 (two) times a day. Use twice daily at 8 AM and 3 PM and record the value in MyChart as directed.    tacrolimus (PROGRAF) 1 MG Cap Take 3 capsules (3 mg total) by mouth every 12 (twelve) hours.    fluticasone propion-salmeterol 115-21 mcg/dose (ADVAIR HFA) 115-21 mcg/actuation HFAA inhaler Inhale 2 puffs into the lungs every 12 (twelve) hours. Controller (Patient taking differently: Inhale 2 puffs into the lungs 2 (two) times daily as needed (shortness of breath). 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     Patient is currently retired   Substance and Sexual Activity    Alcohol use: No    Drug use: No    Sexual activity: Yes     Partners: Female     Review of Systems   Constitutional:  Negative for activity change, chills and fever.   HENT:  Negative for trouble swallowing.    Eyes:  Negative for photophobia and visual disturbance.   Respiratory:  Positive for cough and shortness of breath. Negative for chest tightness and wheezing.    Cardiovascular:  Positive for chest pain. Negative for palpitations and leg swelling.   Gastrointestinal:  Negative for abdominal pain, constipation, diarrhea, nausea and vomiting.   Genitourinary:  Negative for dysuria, frequency, hematuria and urgency.   Musculoskeletal:   Negative for arthralgias, back pain and gait problem.   Skin:  Negative for color change and rash.   Neurological:  Negative for dizziness, syncope, weakness, light-headedness, numbness and headaches.   Psychiatric/Behavioral:  Negative for agitation and confusion. The patient is not nervous/anxious.      Objective:     Vital Signs (Most Recent):  Temp: 98 °F (36.7 °C) (11/23/23 0728)  Pulse: 81 (11/23/23 1151)  Resp: 17 (11/23/23 1151)  BP: (!) 167/87 (11/23/23 1147)  SpO2: 97 % (11/23/23 1151) Vital Signs (24h Range):  Temp:  [98 °F (36.7 °C)] 98 °F (36.7 °C)  Pulse:  [63-86] 81  Resp:  [16-24] 17  SpO2:  [93 %-100 %] 97 %  BP: (148-174)/(68-87) 167/87     Weight: 93.9 kg (207 lb)  Body mass index is 27.31 kg/m².     Physical Exam  Vitals and nursing note reviewed.   Constitutional:       General: He is not in acute distress.     Appearance: He is well-developed.   HENT:      Head: Normocephalic and atraumatic.      Mouth/Throat:      Pharynx: No oropharyngeal exudate.   Neck:      Vascular: JVD present.   Cardiovascular:      Rate and Rhythm: Normal rate.      Heart sounds: Normal heart sounds.   Pulmonary:      Effort: Pulmonary effort is normal. No respiratory distress.      Breath sounds: Wheezing (occational) present.   Abdominal:      General: Bowel sounds are normal. There is no distension.      Palpations: Abdomen is soft.      Tenderness: There is no abdominal tenderness.   Musculoskeletal:         General: No tenderness. Normal range of motion.      Cervical back: Normal range of motion and neck supple.      Right lower leg: No edema.      Left lower leg: No edema.   Lymphadenopathy:      Cervical: No cervical adenopathy.   Skin:     General: Skin is warm and dry.      Capillary Refill: Capillary refill takes less than 2 seconds.      Findings: No rash.   Neurological:      Mental Status: He is alert and oriented to person, place, and time.      Cranial Nerves: No cranial nerve deficit.      Sensory: No  sensory deficit.      Coordination: Coordination normal.   Psychiatric:         Behavior: Behavior normal.         Thought Content: Thought content normal.         Judgment: Judgment normal.                Significant Labs: All pertinent labs within the past 24 hours have been reviewed.  CBC:   Recent Labs   Lab 11/23/23  0819   WBC 8.33   HGB 8.0*   HCT 26.2*   *     CMP:   Recent Labs   Lab 11/23/23  0819      K 4.1      CO2 22*      BUN 41*   CREATININE 5.6*   CALCIUM 9.3   PROT 6.6   ALBUMIN 3.0*   BILITOT 0.4   ALKPHOS 60   AST 25   ALT 21   ANIONGAP 12     Cardiac Markers:   Recent Labs   Lab 11/23/23  0819   BNP 1,718*     Troponin:   Recent Labs   Lab 11/23/23  0819 11/23/23  1053 11/23/23  1200   TROPONINI 0.064* 0.066* 0.076*       Significant Imaging: I have reviewed all pertinent imaging results/findings within the past 24 hours.

## 2023-11-23 NOTE — PLAN OF CARE
SW attempted to meet with pt to complete assessment; medical team at bedside      SW/CM to follow-up      Denia Crowder CD, MSW, LMSW, RSW   Case Management  [facility]  Email: [email or web address]

## 2023-11-23 NOTE — ASSESSMENT & PLAN NOTE
Patient is identified as having Diastolic (HFpEF) heart failure that is Acute on chronic. CHF is currently uncontrolled due to JVD and Dyspnea not returned to baseline after 2 doses of IV diuretic. Latest ECHO performed and demonstrates- Results for orders placed during the hospital encounter of 10/13/23    Echo    Interpretation Summary    Left Ventricle: The left ventricle is normal in size. Ventricular mass is normal. Normal wall thickness. Normal wall motion. There is normal systolic function with a visually estimated ejection fraction of 60 - 65%. Grade II diastolic dysfunction.    Left Atrium: Left atrium is severely dilated.    Right Ventricle: Normal right ventricular cavity size. Wall thickness is normal. Right ventricle wall motion  is normal. Systolic function is normal.    Right Atrium: Right atrium is dilated.    Aortic Valve: Moderately calcified cusps. There is moderate annular calcification present. Mildly restricted motion. There is mild stenosis. Aortic valve area by VTI is 1.85 cm². Aortic valve peak velocity is 2.12 m/s. Mean gradient is 9 mmHg. The dimensionless index is 0.49. There is mild to moderate aortic regurgitation.    Pulmonary Artery: The estimated pulmonary artery systolic pressure is 59 mmHg.    IVC/SVC: Normal venous pressure at 3 mmHg.  . Continue Beta Blocker, ACE/ARB, and Furosemide and monitor clinical status closely. Monitor on telemetry. Patient is on CHF pathway.  Monitor strict Is&Os and daily weights.  Place on fluid restriction of 1.5 L. Cardiology has not been consulted. Continue to stress to patient importance of self efficacy and  on diet for CHF. Last BNP reviewed- and noted below   Recent Labs   Lab 11/23/23  0819   BNP 1,718*   .

## 2023-11-23 NOTE — ASSESSMENT & PLAN NOTE
Chronic, controlled. Latest blood pressure and vitals reviewed-     Temp:  [98 °F (36.7 °C)-98.1 °F (36.7 °C)]   Pulse:  [63-86]   Resp:  [16-24]   BP: (148-174)/(68-87)   SpO2:  [93 %-100 %] .   Home meds for hypertension were reviewed and noted below.   Hypertension Medications               doxazosin (CARDURA) 4 MG tablet Take 1 tablet (4 mg total) by mouth every 12 (twelve) hours.    furosemide (LASIX) 20 MG tablet One po QAM, and one po at lunchtime if swelling prn    furosemide (LASIX) 80 MG tablet Take 1 tablet (80 mg total) by mouth every morning.    hydrALAZINE (APRESOLINE) 50 MG tablet TAKE 1 TABLET (50 MG TOTAL) BY MOUTH 3 (THREE) TIMES DAILY.    isosorbide mononitrate (IMDUR) 30 MG 24 hr tablet Take 2 tablets (60 mg total) by mouth once daily.    metoprolol succinate (TOPROL-XL) 25 MG 24 hr tablet Take 1 tablet (25 mg total) by mouth once daily.    NIFEdipine (PROCARDIA-XL) 60 MG (OSM) 24 hr tablet Take 1 tablet (60 mg total) by mouth 2 (two) times a day.    nitroGLYCERIN (NITROSTAT) 0.4 MG SL tablet Place 1 tablet (0.4 mg total) under the tongue every 5 (five) minutes as needed for Chest pain.            While in the hospital, will manage blood pressure as follows; Continue home antihypertensive regimen    Will utilize p.r.n. blood pressure medication only if patient's blood pressure greater than 180/110 and he develops symptoms such as worsening chest pain or shortness of breath.

## 2023-11-23 NOTE — ASSESSMENT & PLAN NOTE
Patient's COPD is with exacerbation noted by continued dyspnea currently.  Patient is currently off COPD Pathway. Continue scheduled inhalers Supplemental oxygen and monitor respiratory status closely.   - unsure if SOB is due to COPD exacerbation vs CHF   - dounebs q4h awake   - continue home prednisone daily   - guanfacine for cough

## 2023-11-23 NOTE — ED PROVIDER NOTES
Encounter Date: 11/23/2023       History     Chief Complaint   Patient presents with    Shortness of Breath     Cough , kidney xplant 2005     Patient is a 68M with HFpEF (60-65% 10/2023), COPD (current smoker), pAfib, s/p kidney transplant now with ESRD not on HD, CAD (remote history of MI) here for shortness of breath. Notes some dyspnea at baseline including orthopnea and Palmer, however states worsened yesterday. He reports associated dry cough, chills, myalgias, and sore throat. Decreased appetite, but at baseline. No fever, chest pain, abdominal pain, nausea, or vomiting. No recent leg swelling. Taking medications without difficulty, has not missed Lasix doses. Reports adequate urine output.     The history is provided by the patient and the spouse.     Review of patient's allergies indicates:   Allergen Reactions    Ace inhibitors Swelling    Verapamil Other (See Comments)     Other reaction(s): Unknown    Povidone-iodine Itching     Past Medical History:   Diagnosis Date    Atrial fibrillation     CAD (coronary artery disease) 2006    MI in 2006    CHF (congestive heart failure) 2017    CKD (chronic kidney disease) stage 3, GFR 30-59 ml/min     Dr. Latif.  in transplanted kidney    COVID-19 2/7/2023    Diverticulosis     Hyperlipidemia     Hypertension     Keloid cicatrix     Metabolic bone disease     Other emphysema 10/1/2019    Pericarditis     S/P kidney transplant 1992    x2 (1992 & 2005),    Thrombocytopenia     Thyroid disease     Tobacco use 09/05/2014     Past Surgical History:   Procedure Laterality Date    CARDIOVERSION  04/30/15    CHOLECYSTECTOMY      COLONOSCOPY  April 20, 2011    Diverticulosis, repeat recommended in 3 yrs., repeat colonoscopy 2014 revealed 2 polyps.  He should return in 5 years.    COLONOSCOPY N/A 3/13/2020    Procedure: COLONOSCOPY;  Surgeon: Chon Casper MD;  Location: [facility] ([address] [place] [address]);  Service: Endoscopy;  Laterality: N/A;  ok to hold coumadin x5days-see telephone  "encounter 2/4/20-tb    COLONOSCOPY N/A 2/4/2021    Procedure: COLONOSCOPY;  Surgeon: Chon Casper MD;  Location: [facility] (4TH FLR);  Service: Endoscopy;  Laterality: N/A;  Eliquis - per Dr. GAVIN chua to hold x 2 days and "restarted asap"- ERW  last prep "poor", [id number] ordered/ Pt requests Dr. Casper  prep ins. mailed - COVID screening on 2/1/21 PCW- ERW    COLONOSCOPY N/A 3/3/2021    Procedure: COLONOSCOPY;  Surgeon: Chon Casper MD;  Location: [facility] (4TH FLR);  Service: Endoscopy;  Laterality: N/A;  Patient with his second poor bowel pre and has poor prep today.  If patient not intersted or can't get colonoscopy tomorrow will need constipation bowel prep and will need to restart his Eliquis today.Thanks,Chon     per Dr Blunt-ok to hold Eliquis 2 days prior      COVID     CORONARY ANGIOPLASTY WITH STENT PLACEMENT  9/13/2006    IRRIGATION AND DEBRIDEMENT Right 8/30/2023    Procedure: IRRIGATION AND DEBRIDEMENT, RIGHT MIDDLE FINGER;  Surgeon: Martin Larsen MD;  [facility];  Service: Orthopedics;  Laterality: Right;    KIDNEY TRANSPLANT  2005    PARATHYROIDECTOMY      TREATMENT OF CARDIAC ARRHYTHMIA N/A 3/28/2022    Procedure: CARDIOVERSION;  Surgeon: Migue Blunt MD;  Location: [facility] EP LAB;  Service: Cardiology;  Laterality: N/A;  AF, DCC, MAC, GP, 3 PREP*RODRIGO deferred pt 100% compliant*     Family History   Problem Relation Age of Onset    No Known Problems Sister     No Known Problems Brother     Thyroid disease Mother         s/p surgery    Heart disease Father         had pacemaker    No Known Problems Sister     Kidney failure Sister     Kidney disease Sister     No Known Problems Sister     Kidney disease Brother     Kidney failure Brother         s/p transplant    Diabetes Mellitus Neg Hx     Stroke Neg Hx     Heart attack Neg Hx     Cancer Neg Hx     Celiac disease Neg Hx     Cirrhosis Neg Hx     Colon cancer Neg Hx     Esophageal cancer Neg Hx     Inflammatory " bowel disease Neg Hx     Rectal cancer Neg Hx     Stomach cancer Neg Hx     Ulcerative colitis Neg Hx     Liver disease Neg Hx     Liver cancer Neg Hx     Crohn's disease Neg Hx     Melanoma Neg Hx      Social History     Tobacco Use    Smoking status: Every Day     Current packs/day: 0.00     Average packs/day: 0.5 packs/day for 40.0 years (20.0 ttl pk-yrs)     Types: Cigarettes     Start date: 1982     Last attempt to quit: 2022     Years since quittin.7    Smokeless tobacco: Never    Tobacco comments:     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     Patient is currently retired   Substance Use Topics    Alcohol use: No    Drug use: No     Review of Systems   Constitutional:  Positive for chills and fatigue. Negative for appetite change and fever.   HENT:  Positive for congestion and rhinorrhea. Negative for mouth sores, nosebleeds and tinnitus.    Eyes:  Negative for discharge and itching.   Respiratory:  Negative for cough, chest tightness and shortness of breath.    Cardiovascular:  Negative for chest pain and leg swelling.   Gastrointestinal:  Negative for abdominal distention and abdominal pain.   Endocrine: Negative for cold intolerance and heat intolerance.   Genitourinary:  Negative for dysuria and frequency.   Musculoskeletal:  Negative for arthralgias, joint swelling and neck pain.   Skin:  Negative for color change and pallor.   Allergic/Immunologic: Negative for environmental allergies and food allergies.   Neurological:  Negative for dizziness and light-headedness.   Hematological:  Negative for adenopathy.   Psychiatric/Behavioral:  Negative for agitation, behavioral problems and confusion.        Physical Exam     Initial Vitals [23 0728]   BP Pulse Resp Temp SpO2   (!) 148/68 76 (!) 24 98 °F (36.7 °C) (!) 93 %      MAP       --         Physical Exam    Constitutional: He appears well-developed and well-nourished. He is not diaphoretic. No  distress.   HENT:   Head: Normocephalic and atraumatic.   Eyes: Conjunctivae and EOM are normal.   Neck:   Prominent external jugular vein   Normal range of motion.  Cardiovascular:  Normal rate.           Irregular, likely PVC versus p afib   Pulmonary/Chest: He has wheezes. He has no rales.   Good air entry bilaterally, expiratory wheezing most prominent at bases   Abdominal: Abdomen is soft. He exhibits no distension. There is no abdominal tenderness.   Musculoskeletal:         General: Normal range of motion.      Cervical back: Normal range of motion.     Neurological: He is alert and oriented to person, place, and time.   Skin: Skin is warm and dry.   Psychiatric: He has a normal mood and affect. Thought content normal.         ED Course   Procedures  Labs Reviewed   CBC W/ AUTO DIFFERENTIAL - Abnormal; Notable for the following components:       Result Value    RBC 3.20 (*)     Hemoglobin 8.0 (*)     Hematocrit 26.2 (*)     MCH 25.0 (*)     MCHC 30.5 (*)     RDW 18.1 (*)     Platelets 140 (*)     Lymph # 0.8 (*)     Gran % 76.7 (*)     Lymph % 9.2 (*)     All other components within normal limits   COMPREHENSIVE METABOLIC PANEL - Abnormal; Notable for the following components:    CO2 22 (*)     BUN 41 (*)     Creatinine 5.6 (*)     Albumin 3.0 (*)     eGFR 10.3 (*)     All other components within normal limits   TROPONIN I - Abnormal; Notable for the following components:    Troponin I 0.064 (*)     All other components within normal limits   B-TYPE NATRIURETIC PEPTIDE - Abnormal; Notable for the following components:    BNP 1,718 (*)     All other components within normal limits   HIV 1 / 2 ANTIBODY    Narrative:     Release to patient->Immediate   SARS-COV2 (COVID) WITH FLU/RSV BY PCR   TACROLIMUS LEVEL   HEMOGLOBIN A1C   TACROLIMUS LEVEL   TROPONIN I   TROPONIN I     EKG Readings: (Independently Interpreted)   Previous EKG: Compared with most recent EKG Previous EKG Date: 10/25/23.   ST segment depressions  in leads II, III and V5-6, stable from previous EKG  No STEMI  Sinus rhythm, PACs noted       Imaging Results              X-Ray Chest AP Portable (Final result)  Result time 11/23/23 09:48:42      Final result by Mart Chambers Jr., MD (11/23/23 09:48:42)                   Impression:      Improved but persistent increased parenchymal opacification at the bases.  Correlation advised.      Electronically signed by: Mart Chambers MD  Date:    11/23/2023  Time:    09:48               Narrative:    EXAMINATION:  XR CHEST AP PORTABLE    CLINICAL HISTORY:  Chronic obstructive pulmonary disease, unspecified    TECHNIQUE:  Single frontal view of the chest was performed.    COMPARISON:  October 25, 2023    FINDINGS:  Monitoring leads are in place.  Heart size pulmonary vessels are similar.  Continued increased opacification at the bases though improved compared October 25.  Upper lung fields are clear.                                       Medications   tacrolimus capsule 3 mg (3 mg Oral Given 11/23/23 0833)   metoprolol succinate (TOPROL-XL) 24 hr split tablet 12.5 mg (12.5 mg Oral Given 11/23/23 0833)   isosorbide mononitrate 24 hr tablet 60 mg (60 mg Oral Given 11/23/23 0833)   hydrALAZINE tablet 50 mg (50 mg Oral Given 11/23/23 0833)   atorvastatin tablet 40 mg (40 mg Oral Given 11/23/23 0833)   fluticasone furoate-vilanteroL 100-25 mcg/dose diskus inhaler 1 puff (1 puff Inhalation Given 11/23/23 0834)   clopidogreL tablet 75 mg (75 mg Oral Given 11/23/23 0833)   doxazosin tablet 4 mg (4 mg Oral Given 11/23/23 0833)   apixaban tablet 5 mg (5 mg Oral Given 11/23/23 0840)   amiodarone tablet 200 mg (200 mg Oral Given 11/23/23 0833)   sodium chloride 0.9% flush 10 mL (has no administration in time range)   melatonin tablet 6 mg (has no administration in time range)   ondansetron disintegrating tablet 8 mg (has no administration in time range)   ondansetron injection 4 mg (has no administration in time range)    polyethylene glycol packet 17 g (has no administration in time range)   acetaminophen tablet 650 mg (has no administration in time range)   simethicone chewable tablet 80 mg (has no administration in time range)   aluminum-magnesium hydroxide-simethicone 200-200-20 mg/5 mL suspension 30 mL (has no administration in time range)   acetaminophen tablet 650 mg (has no administration in time range)   glucagon (human recombinant) injection 1 mg (has no administration in time range)   naloxone 0.4 mg/mL injection 0.02 mg (has no administration in time range)   dextrose 10% bolus 125 mL 125 mL (has no administration in time range)   dextrose 10% bolus 250 mL 250 mL (has no administration in time range)   sodium chloride 0.9% flush 10 mL (has no administration in time range)   furosemide injection 80 mg (has no administration in time range)   dextrose 40 % gel 16,000 mg (has no administration in time range)   dextrose 40 % gel 24,000 mg (has no administration in time range)   predniSONE tablet 5 mg (has no administration in time range)   NIFEdipine 24 hr tablet 60 mg (60 mg Oral Given 11/23/23 1051)   albuterol-ipratropium 2.5 mg-0.5 mg/3 mL nebulizer solution 3 mL (3 mLs Nebulization Given 11/23/23 0821)   predniSONE tablet 60 mg (60 mg Oral Given 11/23/23 0833)   furosemide injection 40 mg (40 mg Intravenous Given 11/23/23 1051)     Medical Decision Making  68M with COPD, h/o kidney TXP now with ESRD, pulmonary hypertension (likely mixed group II/III) here for shortness of breath a/w non-productive cough, myalgias, chills. Most likely viral URI versus CHF exacerbation vs progression of pulmonary hypertension vs less likely COPD exacerbation vs ACS    BNP elevated to 1700, will admit patient for CHF exacerbation.     Amount and/or Complexity of Data Reviewed  External Data Reviewed: ECG.  Labs: ordered. Decision-making details documented in ED Course.  Radiology: ordered. Decision-making details documented in ED  Course.  ECG/medicine tests:  Decision-making details documented in ED Course.    Risk  Prescription drug management.  Decision regarding hospitalization.               ED Course as of 11/23/23 1121   Thu Nov 23, 2023   0935 CMP with Cr at baseline and no other severe derangements necesitating HD consideration  CBC w/o leukocytosis, Hb 8.0 (near baseline)  BNP 1718, received home 80 lasix PO  Some relief after Duoneb, but wheezing remains present on re-assessment  [AF]   1121 CXR with parenchymal opacities in b/l bases, pulmonary vascular congestion [AF]      ED Course User Index  [AF] Osiris Duncan MD                          Clinical Impression:  Final diagnoses:  [R06.02] SOB (shortness of breath)  [J44.9] COPD (chronic obstructive pulmonary disease)  [I50.9] Acute on chronic congestive heart failure, unspecified heart failure type (Primary)          ED Disposition Condition    Observation Stable                Osiris Duncan MD  Resident  11/23/23 1121

## 2023-11-23 NOTE — ASSESSMENT & PLAN NOTE
- intermittent chest pain   - continue ASA and imdur  - EKG in NSR with LVH   - troponin 0.064 >> 0.076, continue to trend   - state EKG and troponin if chest pain returns

## 2023-11-23 NOTE — H&P
"[facility] [address] [place] [address] Medicine  History & Physical    Patient Name: Ibrahima Phelps  MRN: [id number]  Patient Class: OP- Observation  Admission Date: 11/23/2023  Attending Physician: Nadira Shaffer MD   Primary Care Provider: Anatoliy Colindres MD         Patient information was obtained from patient, spouse/SO, and ER records.     Subjective:     Principal Problem:<principal problem not specified>    Chief Complaint:   Chief Complaint   Patient presents with    Shortness of Breath     Cough , kidney xplant 2005        HPI: Ibrahima Phelps is a 68 y.o. male with a PMH of HFpEF, COPD, a-fib, CKD s/p renal transplant, CAD who presented to the ED with complaints of SOB and CP. States that he has had a worsening cough and SOB x 3 days. The cough is nonproductive. The CP and SOB are worse when he get "agitated". He has not missed any doses of lasix recently. He is unsure if the SOB is due to his COPD or CHF. Denies fevers, chills, runny nose, nausea, vomiting, diarrhea or recent illness exposure.     ED: Hypertensive and tachypneic on arrival. CBC without leukocytosis but with chronic stable anemia. Cr 5.6 (baseline). BNP elevated to 1718. Troponin 0.064 >> 0.076. EKG in NSR with LVH. CXR with improved but persistent increased parenchymal opacification at the bases. Given 40 of IV lasix, 3 dounebs and 60mg of prednisone with improvement. Patient was admitted to hospital medicine for further care.     Past Medical History:   Diagnosis Date    Atrial fibrillation     CAD (coronary artery disease) 2006    MI in 2006    CHF (congestive heart failure) 2017    CKD (chronic kidney disease) stage 3, GFR 30-59 ml/min     Dr. Latif.  in transplanted kidney    COVID-19 2/7/2023    Diverticulosis     Hyperlipidemia     Hypertension     Keloid cicatrix     Metabolic bone disease     Other emphysema 10/1/2019    Pericarditis     S/P kidney transplant 1992    x2 (1992 & 2005),    Thrombocytopenia     Thyroid disease     " "Tobacco use 09/05/2014       Past Surgical History:   Procedure Laterality Date    CARDIOVERSION  04/30/15    CHOLECYSTECTOMY      COLONOSCOPY  April 20, 2011    Diverticulosis, repeat recommended in 3 yrs., repeat colonoscopy 2014 revealed 2 polyps.  He should return in 5 years.    COLONOSCOPY N/A 3/13/2020    Procedure: COLONOSCOPY;  Surgeon: Chon Casper MD;  Location: [facility] (4TH FLR);  Service: Endoscopy;  Laterality: N/A;  ok to hold coumadin x5days-see telephone encounter 2/4/20-tb    COLONOSCOPY N/A 2/4/2021    Procedure: COLONOSCOPY;  Surgeon: Chon Casper MD;  Location: [facility] (4TH FLR);  Service: Endoscopy;  Laterality: N/A;  Eliquis - per Dr. GAVIN Blunt ok to hold x 2 days and "restarted asap"- ERW  last prep "poor", [id number] ordered/ Pt requests Dr. Casper  prep ins. mailed - COVID screening on 2/1/21 PCW- ERW    COLONOSCOPY N/A 3/3/2021    Procedure: COLONOSCOPY;  Surgeon: Chon Casper MD;  Location: [facility] (4TH FLR);  Service: Endoscopy;  Laterality: N/A;  Patient with his second poor bowel pre and has poor prep today.  If patient not intersted or can't get colonoscopy tomorrow will need constipation bowel prep and will need to restart his Eliquis today.Thanks,Chon Blunt-ok to hold Eliquis 2 days prior      COVID     CORONARY ANGIOPLASTY WITH STENT PLACEMENT  9/13/2006    IRRIGATION AND DEBRIDEMENT Right 8/30/2023    Procedure: IRRIGATION AND DEBRIDEMENT, RIGHT MIDDLE FINGER;  Surgeon: Martin Larsen MD;  [facility];  Service: Orthopedics;  Laterality: Right;    KIDNEY TRANSPLANT  2005    PARATHYROIDECTOMY      TREATMENT OF CARDIAC ARRHYTHMIA N/A 3/28/2022    Procedure: CARDIOVERSION;  Surgeon: Migue Blunt MD;  Location: [facility] EP LAB;  Service: Cardiology;  Laterality: N/A;  AF, DCC, MAC, GP, 3 PREP*RODRIGO deferred pt 100% compliant*       Review of patient's allergies indicates:   Allergen Reactions    Ace inhibitors Swelling    Verapamil " Other (See Comments)     Other reaction(s): Unknown    Povidone-iodine Itching       No current facility-administered medications on file prior to encounter.     Current Outpatient Medications on File Prior to Encounter   Medication Sig    acetaminophen (TYLENOL) 500 MG tablet Take 1 tablet (500 mg total) by mouth every 6 (six) hours as needed for Pain.    albuterol (PROVENTIL) 2.5 mg /3 mL (0.083 %) nebulizer solution Take 3 mLs (2.5 mg total) by nebulization every 6 (six) hours as needed for Wheezing. Rescue    albuterol (VENTOLIN HFA) 90 mcg/actuation inhaler Inhale 2 puffs into the lungs every 6 (six) hours as needed for Wheezing or Shortness of Breath. Rescue    amiodarone (PACERONE) 200 MG Tab Take 1 tablet (200 mg total) by mouth once daily.    apixaban (ELIQUIS) 5 mg Tab Take 1 tablet (5 mg total) by mouth 2 (two) times daily.    atorvastatin (LIPITOR) 40 MG tablet Take 1 tablet (40 mg total) by mouth once daily.    b complex vitamins (B COMPLEX-VITAMIN B12) tablet Take 1 tablet by mouth once daily.    calcium acetate,phosphat bind, (PHOSLO) 667 mg capsule TAKE 1 CAPSULE BY MOUTH THREE TIMES A DAY WITH MEALS AND SNACKS    calcium carbonate (CALCIUM 600 ORAL) Take 1 tablet by mouth 2 (two) times a day.    CHOLECALCIFEROL, VITAMIN D3, ORAL Take 2 tablets by mouth 2 (two) times daily.    clopidogreL (PLAVIX) 75 mg tablet Take 1 tablet (75 mg total) by mouth once daily.    doxazosin (CARDURA) 4 MG tablet Take 1 tablet (4 mg total) by mouth every 12 (twelve) hours.    famotidine (PEPCID) 20 MG tablet Take 1 tablet (20 mg total) by mouth 2 (two) times daily.    fexofenadine HCl (ALLEGRA ORAL) Take 1 tablet by mouth daily as needed (allergies).    fluticasone propionate (FLONASE) 50 mcg/actuation nasal spray 1 spray (50 mcg total) by Each Nostril route daily as needed for Allergies.    FLUZONE HIGHDOSE QUAD 23-24  mcg/0.7 mL Syrg     furosemide (LASIX) 20 MG tablet One po QAM, and one po at lunchtime if  swelling prn    furosemide (LASIX) 80 MG tablet Take 1 tablet (80 mg total) by mouth every morning.    gabapentin (NEURONTIN) 100 MG capsule Take 1 capsule (100 mg total) by mouth as needed (pain).    hydrALAZINE (APRESOLINE) 50 MG tablet TAKE 1 TABLET (50 MG TOTAL) BY MOUTH 3 (THREE) TIMES DAILY.    isosorbide mononitrate (IMDUR) 30 MG 24 hr tablet Take 2 tablets (60 mg total) by mouth once daily.    metoprolol succinate (TOPROL-XL) 25 MG 24 hr tablet Take 1 tablet (25 mg total) by mouth once daily. (Patient taking differently: Take 12.5 mg by mouth 2 (two) times daily.)    MITIGARE 0.6 mg Cap TAKE 1 CAPSULE BY MOUTH TWICE A DAY AS NEEDED GOUT FLARE UP TO 3 DAYS    multivitamin (THERAGRAN) per tablet Take 1 tablet by mouth Daily.    NIFEdipine (PROCARDIA-XL) 60 MG (OSM) 24 hr tablet Take 1 tablet (60 mg total) by mouth 2 (two) times a day.    nitroGLYCERIN (NITROSTAT) 0.4 MG SL tablet Place 1 tablet (0.4 mg total) under the tongue every 5 (five) minutes as needed for Chest pain.    omeprazole (PRILOSEC) 20 MG capsule Take 1 capsule (20 mg total) by mouth daily as needed (heartburn).    paricalcitoL (ZEMPLAR) 1 MCG capsule TAKE 1 CAPSULE ON MONDAY, WEDNESDAY AND FRIDAY    [facility] BIVAL,12Y UP,,PF, 30 mcg/0.3 mL injection Inject into the muscle.    potassium chloride (KLOR-CON) 10 MEQ TbSR Take 1 tablet (10 mEq total) by mouth daily as needed (to be taken with lasix).,    predniSONE (DELTASONE) 5 MG tablet TAKE 1 TABLET BY MOUTH EVERY DAY IN THE MORNING    pulse oximeter (PULSE OXIMETER) device by Apply Externally route 2 (two) times a day. Use twice daily at 8 AM and 3 PM and record the value in MyChart as directed.    tacrolimus (PROGRAF) 1 MG Cap Take 3 capsules (3 mg total) by mouth every 12 (twelve) hours.    fluticasone propion-salmeterol 115-21 mcg/dose (ADVAIR HFA) 115-21 mcg/actuation HFAA inhaler Inhale 2 puffs into the lungs every 12 (twelve) hours. Controller (Patient taking differently: Inhale 2  puffs into the lungs 2 (two) times daily as needed (shortness of breath). 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     Patient is currently retired   Substance and Sexual Activity    Alcohol use: No    Drug use: No    Sexual activity: Yes     Partners: Female     Review of Systems   Constitutional:  Negative for activity change, chills and fever.   HENT:  Negative for trouble swallowing.    Eyes:  Negative for photophobia and visual disturbance.   Respiratory:  Positive for cough and shortness of breath. Negative for chest tightness and wheezing.    Cardiovascular:  Positive for chest pain. Negative for palpitations and leg swelling.   Gastrointestinal:  Negative for abdominal pain, constipation, diarrhea, nausea and vomiting.   Genitourinary:  Negative for dysuria, frequency, hematuria and urgency.   Musculoskeletal:  Negative for arthralgias, back pain and gait problem.   Skin:  Negative for color change and rash.   Neurological:  Negative for dizziness, syncope, weakness, light-headedness, numbness and headaches.   Psychiatric/Behavioral:  Negative for agitation and confusion. The patient is not nervous/anxious.      Objective:     Vital Signs (Most Recent):  Temp: 98 °F (36.7 °C) (23 0728)  Pulse: 81 (23 1151)  Resp: 17 (23 1151)  BP: (!) 167/87 (23 1147)  SpO2: 97 % (23 1151) Vital Signs (24h  Range):  Temp:  [98 °F (36.7 °C)] 98 °F (36.7 °C)  Pulse:  [63-86] 81  Resp:  [16-24] 17  SpO2:  [93 %-100 %] 97 %  BP: (148-174)/(68-87) 167/87     Weight: 93.9 kg (207 lb)  Body mass index is 27.31 kg/m².     Physical Exam  Vitals and nursing note reviewed.   Constitutional:       General: He is not in acute distress.     Appearance: He is well-developed.   HENT:      Head: Normocephalic and atraumatic.      Mouth/Throat:      Pharynx: No oropharyngeal exudate.   Neck:      Vascular: JVD present.   Cardiovascular:      Rate and Rhythm: Normal rate.      Heart sounds: Normal heart sounds.   Pulmonary:      Effort: Pulmonary effort is normal. No respiratory distress.      Breath sounds: Wheezing (occational) present.   Abdominal:      General: Bowel sounds are normal. There is no distension.      Palpations: Abdomen is soft.      Tenderness: There is no abdominal tenderness.   Musculoskeletal:         General: No tenderness. Normal range of motion.      Cervical back: Normal range of motion and neck supple.      Right lower leg: No edema.      Left lower leg: No edema.   Lymphadenopathy:      Cervical: No cervical adenopathy.   Skin:     General: Skin is warm and dry.      Capillary Refill: Capillary refill takes less than 2 seconds.      Findings: No rash.   Neurological:      Mental Status: He is alert and oriented to person, place, and time.      Cranial Nerves: No cranial nerve deficit.      Sensory: No sensory deficit.      Coordination: Coordination normal.   Psychiatric:         Behavior: Behavior normal.         Thought Content: Thought content normal.         Judgment: Judgment normal.                Significant Labs: All pertinent labs within the past 24 hours have been reviewed.  CBC:   Recent Labs   Lab 11/23/23  0819   WBC 8.33   HGB 8.0*   HCT 26.2*   *     CMP:   Recent Labs   Lab 11/23/23  0819      K 4.1      CO2 22*      BUN 41*   CREATININE 5.6*   CALCIUM 9.3   PROT 6.6    ALBUMIN 3.0*   BILITOT 0.4   ALKPHOS 60   AST 25   ALT 21   ANIONGAP 12     Cardiac Markers:   Recent Labs   Lab 11/23/23  0819   BNP 1,718*     Troponin:   Recent Labs   Lab 11/23/23  0819 11/23/23  1053 11/23/23  1200   TROPONINI 0.064* 0.066* 0.076*       Significant Imaging: I have reviewed all pertinent imaging results/findings within the past 24 hours.  Assessment/Plan:     CKD (chronic kidney disease) stage 5, GFR less than 15 ml/min  Creatine stable for now. BMP reviewed- noted Estimated Creatinine Clearance: 14.1 mL/min (A) (based on SCr of 5.6 mg/dL (H)). according to latest data. Based on current GFR, CKD stage is stage 5 - GFR < 15.  Monitor UOP and serial BMP and adjust therapy as needed. Renally dose meds. Avoid nephrotoxic medications and procedures.    Chest pain  - intermittent chest pain   - continue ASA and imdur  - EKG in NSR with LVH   - troponin 0.064 >> 0.076, continue to trend   - state EKG and troponin if chest pain returns     Acute on chronic heart failure with preserved ejection fraction  Patient is identified as having Diastolic (HFpEF) heart failure that is Acute on chronic. CHF is currently uncontrolled due to JVD and Dyspnea not returned to baseline after 2 doses of IV diuretic. Latest ECHO performed and demonstrates- Results for orders placed during the hospital encounter of 10/13/23    Echo    Interpretation Summary    Left Ventricle: The left ventricle is normal in size. Ventricular mass is normal. Normal wall thickness. Normal wall motion. There is normal systolic function with a visually estimated ejection fraction of 60 - 65%. Grade II diastolic dysfunction.    Left Atrium: Left atrium is severely dilated.    Right Ventricle: Normal right ventricular cavity size. Wall thickness is normal. Right ventricle wall motion  is normal. Systolic function is normal.    Right Atrium: Right atrium is dilated.    Aortic Valve: Moderately calcified cusps. There is moderate annular  calcification present. Mildly restricted motion. There is mild stenosis. Aortic valve area by VTI is 1.85 cm². Aortic valve peak velocity is 2.12 m/s. Mean gradient is 9 mmHg. The dimensionless index is 0.49. There is mild to moderate aortic regurgitation.    Pulmonary Artery: The estimated pulmonary artery systolic pressure is 59 mmHg.    IVC/SVC: Normal venous pressure at 3 mmHg.  . Continue Beta Blocker, ACE/ARB, and Furosemide and monitor clinical status closely. Monitor on telemetry. Patient is on CHF pathway.  Monitor strict Is&Os and daily weights.  Place on fluid restriction of 1.5 L. Cardiology has not been consulted. Continue to stress to patient importance of self efficacy and  on diet for CHF. Last BNP reviewed- and noted below   Recent Labs   Lab 11/23/23  0819   BNP 1,718*   .    Pulmonary HTN  - history of pulm HTN   - Results for orders placed during the hospital encounter of 10/13/23    Echo    Interpretation Summary    Left Ventricle: The left ventricle is normal in size. Ventricular mass is normal. Normal wall thickness. Normal wall motion. There is normal systolic function with a visually estimated ejection fraction of 60 - 65%. Grade II diastolic dysfunction.    Left Atrium: Left atrium is severely dilated.    Right Ventricle: Normal right ventricular cavity size. Wall thickness is normal. Right ventricle wall motion  is normal. Systolic function is normal.    Right Atrium: Right atrium is dilated.    Aortic Valve: Moderately calcified cusps. There is moderate annular calcification present. Mildly restricted motion. There is mild stenosis. Aortic valve area by VTI is 1.85 cm². Aortic valve peak velocity is 2.12 m/s. Mean gradient is 9 mmHg. The dimensionless index is 0.49. There is mild to moderate aortic regurgitation.    Pulmonary Artery: The estimated pulmonary artery systolic pressure is 59 mmHg.    IVC/SVC: Normal venous pressure at 3 mmHg.      Chronic obstructive pulmonary  disease  Patient's COPD is with exacerbation noted by continued dyspnea currently.  Patient is currently off COPD Pathway. Continue scheduled inhalers Supplemental oxygen and monitor respiratory status closely.   - unsure if SOB is due to COPD exacerbation vs CHF   - dounebs q4h awake   - continue home prednisone daily   - guanfacine for cough       Paroxysmal atrial fibrillation  Patient with Paroxysmal (<7 days) atrial fibrillation which is controlled currently with Beta Blocker and Amiodarone. Patient is currently in sinus rhythm.[id number] Score: 2. Anticoagulation indicated. Anticoagulation done with eliquis .    H/O kidney transplant  - history of renal transplant   - KTM consulted   - continue tacro and prednisone      Mixed hyperlipidemia  - continue statin       CAD (coronary artery disease)  Patient with known CAD s/p stent placement, which is controlled Will continue ASA, Plavix, and Statin and monitor for S/Sx of angina/ACS. Continue to monitor on telemetry.     Primary hypertension  Chronic, controlled. Latest blood pressure and vitals reviewed-     Temp:  [98 °F (36.7 °C)-98.1 °F (36.7 °C)]   Pulse:  [63-86]   Resp:  [16-24]   BP: (148-174)/(68-87)   SpO2:  [93 %-100 %] .   Home meds for hypertension were reviewed and noted below.   Hypertension Medications               doxazosin (CARDURA) 4 MG tablet Take 1 tablet (4 mg total) by mouth every 12 (twelve) hours.    furosemide (LASIX) 20 MG tablet One po QAM, and one po at lunchtime if swelling prn    furosemide (LASIX) 80 MG tablet Take 1 tablet (80 mg total) by mouth every morning.    hydrALAZINE (APRESOLINE) 50 MG tablet TAKE 1 TABLET (50 MG TOTAL) BY MOUTH 3 (THREE) TIMES DAILY.    isosorbide mononitrate (IMDUR) 30 MG 24 hr tablet Take 2 tablets (60 mg total) by mouth once daily.    metoprolol succinate (TOPROL-XL) 25 MG 24 hr tablet Take 1 tablet (25 mg total) by mouth once daily.    NIFEdipine (PROCARDIA-XL) 60 MG (OSM) 24 hr tablet Take 1 tablet  (60 mg total) by mouth 2 (two) times a day.    nitroGLYCERIN (NITROSTAT) 0.4 MG SL tablet Place 1 tablet (0.4 mg total) under the tongue every 5 (five) minutes as needed for Chest pain.            While in the hospital, will manage blood pressure as follows; Continue home antihypertensive regimen    Will utilize p.r.n. blood pressure medication only if patient's blood pressure greater than 180/110 and he develops symptoms such as worsening chest pain or shortness of breath.      VTE Risk Mitigation (From admission, onward)           Ordered     apixaban tablet 5 mg  2 times daily         11/23/23 0804                         On 11/23/2023, patient should be placed in hospital observation services under my care in collaboration with Dr. Nadira Shaffer.           Wendy Clements PA-C  Department of Hospital Medicine  [facility] - Observation 11

## 2023-11-23 NOTE — ASSESSMENT & PLAN NOTE
- history of pulm HTN   - Results for orders placed during the hospital encounter of 10/13/23    Echo    Interpretation Summary    Left Ventricle: The left ventricle is normal in size. Ventricular mass is normal. Normal wall thickness. Normal wall motion. There is normal systolic function with a visually estimated ejection fraction of 60 - 65%. Grade II diastolic dysfunction.    Left Atrium: Left atrium is severely dilated.    Right Ventricle: Normal right ventricular cavity size. Wall thickness is normal. Right ventricle wall motion  is normal. Systolic function is normal.    Right Atrium: Right atrium is dilated.    Aortic Valve: Moderately calcified cusps. There is moderate annular calcification present. Mildly restricted motion. There is mild stenosis. Aortic valve area by VTI is 1.85 cm². Aortic valve peak velocity is 2.12 m/s. Mean gradient is 9 mmHg. The dimensionless index is 0.49. There is mild to moderate aortic regurgitation.    Pulmonary Artery: The estimated pulmonary artery systolic pressure is 59 mmHg.    IVC/SVC: Normal venous pressure at 3 mmHg.

## 2023-11-23 NOTE — PLAN OF CARE
[facility] - Emergency Dept  Initial Discharge Assessment       Primary Care Provider: Anatoliy Colindres MD    Admission Diagnosis: Acute on chronic congestive heart failure, unspecified heart failure type [I50.9]  Acute on chronic congestive heart failure, unspecified heart failure type [I50.9]    Admission Date: 11/23/2023  Expected Discharge Date: 11/24/2023    Pt stated he is independent with his ADL's and ambulation and does not use equipment or require assistance.    Pt to d/c home with no needs when ready    Transition of Care Barriers: (P) None    Payor: [employer] MEDICARE / Plan: [employer] MEDICARE HMO / Product Type: Capitation /     Extended Emergency Contact Information  Primary Emergency Contact: Shea Phelps  Address: [address] [place] [address] [facility]  Home Phone: [phone]  Mobile Phone: [phone]  Relation: Spouse  Preferred language: English  Secondary Emergency Contact: Mary Becerra  Address: [address] [place] [address] [facility]  Home Phone: [phone]  Mobile Phone: [phone]  Relation: Daughter    Discharge Plan A: (P) Home  Discharge Plan B: (P) Home      [facility] - [facility] [address] [facility]  [address] [facility] [address]  Phone: [phone] Fax: [phone]    [facility] #[id number] - [place], [place] - [address]  [address]  [facility] [place] [address]  Phone: [phone] Fax: [phone]      Initial Assessment (most recent)       Adult Discharge Assessment - 11/23/23 1044          Discharge Assessment    Assessment Type Discharge Planning Assessment (P)      Confirmed/corrected address, phone number and insurance Yes (P)      Confirmed Demographics Correct on Facesheet (P)      Source of Information patient (P)      Does patient/caregiver understand observation status Yes (P)      People in Home spouse (P)      Facility Arrived From:  home (P)      Do you expect to return to your current living situation? Yes (P)      Do you have help at home or someone to help you manage your care at home? No (P)      Prior to hospitilization cognitive status: Alert/Oriented;No Deficits (P)      Current cognitive status: Alert/Oriented;No Deficits (P)      Home Accessibility wheelchair accessible (P)      Home Layout Able to live on 1st floor (P)      Equipment Currently Used at Home none (P)      Readmission within 30 days? Yes (P)      Patient currently being followed by outpatient case management? Yes (P)      If yes, name of outpatient case management following: [facility] outpatient case management (P)      Do you currently have service(s) that help you manage your care at home? No (P)      Do you have any problems affording any of your prescribed medications? No (P)      Is the patient taking medications as prescribed? yes (P)      Who is going to help you get home at discharge? family/friends (P)      How do you get to doctors appointments? car, drives self;family or friend will provide (P)      Are you on dialysis? No (P)      Do you take coumadin? No (P)      DME Needed Upon Discharge  none (P)      Discharge Plan discussed with: Patient (P)      Transition of Care Barriers None (P)      Discharge Plan A Home (P)      Discharge Plan B Home (P)         Physical Activity    On average, how many days per week do you engage in moderate to strenuous exercise (like a brisk walk)? 0 days (P)      On average, how many minutes do you engage in exercise at this level? 0 min (P)         Financial Resource Strain    How hard is it for you to pay for the very basics like food, housing, medical care, and heating? Not very hard (P)         Housing Stability    In the last 12 months, was there a time when you were not able to pay the mortgage or rent on time? No (P)      In the last 12 months, was there a time when you did not have a steady place to sleep or slept in a  shelter (including now)? No (P)         Transportation Needs    In the past 12 months, has lack of transportation kept you from medical appointments or from getting medications? No (P)      In the past 12 months, has lack of transportation kept you from meetings, work, or from getting things needed for daily living? No (P)         Food Insecurity    Within the past 12 months, you worried that your food would run out before you got the money to buy more. Never true (P)      Within the past 12 months, the food you bought just didn't last and you didn't have money to get more. Never true (P)         Stress    Do you feel stress - tense, restless, nervous, or anxious, or unable to sleep at night because your mind is troubled all the time - these days? Only a little (P)         Social Connections    In a typical week, how many times do you talk on the phone with family, friends, or neighbors? More than three times a week (P)      How often do you get together with friends or relatives? More than three times a week (P)      How often do you attend Restorationist or Christianity services? Never (P)      Do you belong to any clubs or organizations such as Restorationist groups, unions, fraternal or athletic groups, or school groups? No (P)      How often do you attend meetings of the clubs or organizations you belong to? Never (P)      Are you , , , , never , or living with a partner?  (P)         Alcohol Use    Q1: How often do you have a drink containing alcohol? Never (P)      Q2: How many drinks containing alcohol do you have on a typical day when you are drinking? Patient does not drink (P)      Q3: How often do you have six or more drinks on one occasion? Never (P)         OTHER    Name(s) of People in Home spouse Shea (P)                      Denia Crowder, BAILEY, MSW, LMSW, RSW   Case Management  [facility]  Email: [email or web address]      Readmission Assessment (most  recent)       Readmission Assessment - 11/23/23 1038          Readmission    Why were you hospitalized in the last 30 days? Acute renal failure superimposed on stage 4 chronic kidney disease     Why were you readmitted? New medical problem     When you left the hospital how did you feel? fine     When you left the hospital where did you go? Home with Family     Did patient/caregiver refused recommended DC plan? No     Tell me about what happened between when you left the hospital and the day you returned. started to feel unwell     Did you try to manage your symptoms your self? Yes     What did you do? tried to drink less and urinate more     Did you have  a follow-up appointment on discharge? Yes     Did you go? Yes     Was this a planned readmission? No

## 2023-11-24 DIAGNOSIS — R06.02 SOB (SHORTNESS OF BREATH): Primary | ICD-10-CM

## 2023-11-24 PROBLEM — I21.4 NSTEMI (NON-ST ELEVATED MYOCARDIAL INFARCTION): Status: ACTIVE | Noted: 2023-11-24

## 2023-11-24 LAB
ALBUMIN SERPL BCP-MCNC: 2.7 G/DL (ref 3.5–5.2)
ALP SERPL-CCNC: 54 U/L (ref 55–135)
ALT SERPL W/O P-5'-P-CCNC: 17 U/L (ref 10–44)
ANION GAP SERPL CALC-SCNC: 12 MMOL/L (ref 8–16)
ASCENDING AORTA: 3.19 CM
AST SERPL-CCNC: 22 U/L (ref 10–40)
AV INDEX (PROSTH): 0.41
AV MEAN GRADIENT: 18 MMHG
AV PEAK GRADIENT: 34 MMHG
AV VALVE AREA BY VELOCITY RATIO: 1.26 CM²
AV VALVE AREA: 1.34 CM²
AV VELOCITY RATIO: 0.39
BASOPHILS # BLD AUTO: 0.01 K/UL (ref 0–0.2)
BASOPHILS NFR BLD: 0.1 % (ref 0–1.9)
BILIRUB SERPL-MCNC: 0.3 MG/DL (ref 0.1–1)
BSA FOR ECHO PROCEDURE: 2.16 M2
BUN SERPL-MCNC: 50 MG/DL (ref 8–23)
CALCIUM SERPL-MCNC: 8.7 MG/DL (ref 8.7–10.5)
CHLORIDE SERPL-SCNC: 108 MMOL/L (ref 95–110)
CO2 SERPL-SCNC: 22 MMOL/L (ref 23–29)
CREAT SERPL-MCNC: 5.9 MG/DL (ref 0.5–1.4)
CV ECHO LV RWT: 0.32 CM
DIFFERENTIAL METHOD BLD: ABNORMAL
DOP CALC AO PEAK VEL: 2.92 M/S
DOP CALC AO VTI: 68.87 CM
DOP CALC LVOT AREA: 3.2 CM2
DOP CALC LVOT DIAMETER: 2.03 CM
DOP CALC LVOT PEAK VEL: 1.14 M/S
DOP CALC LVOT STROKE VOLUME: 92.32 CM3
DOP CALCLVOT PEAK VEL VTI: 28.54 CM
E WAVE DECELERATION TIME: 192.94 MSEC
E/A RATIO: 2.86
E/E' RATIO: 26.67 M/S
ECHO LV POSTERIOR WALL: 0.9 CM (ref 0.6–1.1)
EOSINOPHIL # BLD AUTO: 0 K/UL (ref 0–0.5)
EOSINOPHIL NFR BLD: 0.3 % (ref 0–8)
ERYTHROCYTE [DISTWIDTH] IN BLOOD BY AUTOMATED COUNT: 18.2 % (ref 11.5–14.5)
EST. GFR  (NO RACE VARIABLE): 9.7 ML/MIN/1.73 M^2
FRACTIONAL SHORTENING: 38 % (ref 28–44)
GLUCOSE SERPL-MCNC: 103 MG/DL (ref 70–110)
HCT VFR BLD AUTO: 23.6 % (ref 40–54)
HGB BLD-MCNC: 7.6 G/DL (ref 14–18)
IMM GRANULOCYTES # BLD AUTO: 0.04 K/UL (ref 0–0.04)
IMM GRANULOCYTES NFR BLD AUTO: 0.6 % (ref 0–0.5)
INTERVENTRICULAR SEPTUM: 0.8 CM (ref 0.6–1.1)
LA MAJOR: 6.18 CM
LA MINOR: 6.17 CM
LA WIDTH: 4.72 CM
LEFT ATRIUM SIZE: 3.7 CM
LEFT ATRIUM VOLUME INDEX MOD: 45 ML/M2
LEFT ATRIUM VOLUME INDEX: 42.6 ML/M2
LEFT ATRIUM VOLUME MOD: 96.84 CM3
LEFT ATRIUM VOLUME: 91.66 CM3
LEFT INTERNAL DIMENSION IN SYSTOLE: 3.53 CM (ref 2.1–4)
LEFT VENTRICLE DIASTOLIC VOLUME INDEX: 73.17 ML/M2
LEFT VENTRICLE DIASTOLIC VOLUME: 157.31 ML
LEFT VENTRICLE MASS INDEX: 84 G/M2
LEFT VENTRICLE SYSTOLIC VOLUME INDEX: 24.2 ML/M2
LEFT VENTRICLE SYSTOLIC VOLUME: 52.01 ML
LEFT VENTRICULAR INTERNAL DIMENSION IN DIASTOLE: 5.66 CM (ref 3.5–6)
LEFT VENTRICULAR MASS: 181.45 G
LV LATERAL E/E' RATIO: 30 M/S
LV SEPTAL E/E' RATIO: 24 M/S
LYMPHOCYTES # BLD AUTO: 0.7 K/UL (ref 1–4.8)
LYMPHOCYTES NFR BLD: 9.8 % (ref 18–48)
MAGNESIUM SERPL-MCNC: 1.8 MG/DL (ref 1.6–2.6)
MCH RBC QN AUTO: 25.2 PG (ref 27–31)
MCHC RBC AUTO-ENTMCNC: 32.2 G/DL (ref 32–36)
MCV RBC AUTO: 78 FL (ref 82–98)
MONOCYTES # BLD AUTO: 0.5 K/UL (ref 0.3–1)
MONOCYTES NFR BLD: 7.6 % (ref 4–15)
MV A" WAVE DURATION": 12.56 MSEC
MV PEAK A VEL: 0.42 M/S
MV PEAK E VEL: 1.2 M/S
MV STENOSIS PRESSURE HALF TIME: 55.95 MS
MV VALVE AREA P 1/2 METHOD: 3.93 CM2
NEUTROPHILS # BLD AUTO: 5.8 K/UL (ref 1.8–7.7)
NEUTROPHILS NFR BLD: 81.6 % (ref 38–73)
NRBC BLD-RTO: 0 /100 WBC
OHS LV EJECTION FRACTION SIMPSONS BIPLANE MOD: 55 %
PHOSPHATE SERPL-MCNC: 5.2 MG/DL (ref 2.7–4.5)
PISA TR MAX VEL: 3.06 M/S
PLATELET # BLD AUTO: 140 K/UL (ref 150–450)
PMV BLD AUTO: ABNORMAL FL (ref 9.2–12.9)
POTASSIUM SERPL-SCNC: 4.5 MMOL/L (ref 3.5–5.1)
PROT SERPL-MCNC: 5.9 G/DL (ref 6–8.4)
PULM VEIN S/D RATIO: 0.66
PV PEAK D VEL: 0.85 M/S
PV PEAK S VEL: 0.56 M/S
RA MAJOR: 5.56 CM
RA PRESSURE ESTIMATED: 8 MMHG
RA WIDTH: 3.6 CM
RBC # BLD AUTO: 3.01 M/UL (ref 4.6–6.2)
RIGHT VENTRICULAR END-DIASTOLIC DIMENSION: 3.72 CM
RV TB RVSP: 11 MMHG
SINUS: 3.61 CM
SODIUM SERPL-SCNC: 142 MMOL/L (ref 136–145)
STJ: 2.53 CM
TACROLIMUS BLD-MCNC: 3.7 NG/ML (ref 5–15)
TDI LATERAL: 0.04 M/S
TDI SEPTAL: 0.05 M/S
TDI: 0.05 M/S
TR MAX PG: 37 MMHG
TRICUSPID ANNULAR PLANE SYSTOLIC EXCURSION: 2.95 CM
TROPONIN I SERPL DL<=0.01 NG/ML-MCNC: 0.09 NG/ML (ref 0–0.03)
TROPONIN I SERPL DL<=0.01 NG/ML-MCNC: 0.1 NG/ML (ref 0–0.03)
TV REST PULMONARY ARTERY PRESSURE: 45 MMHG
WBC # BLD AUTO: 7.11 K/UL (ref 3.9–12.7)
Z-SCORE OF LEFT VENTRICULAR DIMENSION IN END DIASTOLE: -2.09
Z-SCORE OF LEFT VENTRICULAR DIMENSION IN END SYSTOLE: -1.47

## 2023-11-24 PROCEDURE — 84100 ASSAY OF PHOSPHORUS: CPT

## 2023-11-24 PROCEDURE — 36415 COLL VENOUS BLD VENIPUNCTURE: CPT

## 2023-11-24 PROCEDURE — 25000003 PHARM REV CODE 250

## 2023-11-24 PROCEDURE — 85025 COMPLETE CBC W/AUTO DIFF WBC: CPT

## 2023-11-24 PROCEDURE — 84484 ASSAY OF TROPONIN QUANT: CPT | Performed by: NURSE PRACTITIONER

## 2023-11-24 PROCEDURE — 94761 N-INVAS EAR/PLS OXIMETRY MLT: CPT

## 2023-11-24 PROCEDURE — 99214 OFFICE O/P EST MOD 30 MIN: CPT | Mod: ,,, | Performed by: INTERNAL MEDICINE

## 2023-11-24 PROCEDURE — 80197 ASSAY OF TACROLIMUS: CPT | Performed by: INTERNAL MEDICINE

## 2023-11-24 PROCEDURE — 84484 ASSAY OF TROPONIN QUANT: CPT | Mod: 91

## 2023-11-24 PROCEDURE — 63600175 PHARM REV CODE 636 W HCPCS

## 2023-11-24 PROCEDURE — 94640 AIRWAY INHALATION TREATMENT: CPT | Mod: XB

## 2023-11-24 PROCEDURE — 99213 OFFICE O/P EST LOW 20 MIN: CPT | Mod: ,,, | Performed by: INTERNAL MEDICINE

## 2023-11-24 PROCEDURE — 25000242 PHARM REV CODE 250 ALT 637 W/ HCPCS

## 2023-11-24 PROCEDURE — 96376 TX/PRO/DX INJ SAME DRUG ADON: CPT

## 2023-11-24 PROCEDURE — 80053 COMPREHEN METABOLIC PANEL: CPT

## 2023-11-24 PROCEDURE — G0378 HOSPITAL OBSERVATION PER HR: HCPCS

## 2023-11-24 PROCEDURE — 83735 ASSAY OF MAGNESIUM: CPT

## 2023-11-24 RX ORDER — ISOSORBIDE MONONITRATE 60 MG/1
120 TABLET, EXTENDED RELEASE ORAL DAILY
Status: DISCONTINUED | OUTPATIENT
Start: 2023-11-25 | End: 2023-11-26 | Stop reason: HOSPADM

## 2023-11-24 RX ORDER — ISOSORBIDE MONONITRATE 60 MG/1
60 TABLET, EXTENDED RELEASE ORAL ONCE
Status: DISCONTINUED | OUTPATIENT
Start: 2023-11-24 | End: 2023-11-25

## 2023-11-24 RX ORDER — FUROSEMIDE 10 MG/ML
80 INJECTION INTRAMUSCULAR; INTRAVENOUS DAILY
Status: DISCONTINUED | OUTPATIENT
Start: 2023-11-25 | End: 2023-11-24

## 2023-11-24 RX ORDER — ATORVASTATIN CALCIUM 40 MG/1
80 TABLET, FILM COATED ORAL DAILY
Status: DISCONTINUED | OUTPATIENT
Start: 2023-11-25 | End: 2023-11-26 | Stop reason: HOSPADM

## 2023-11-24 RX ORDER — ATORVASTATIN CALCIUM 40 MG/1
40 TABLET, FILM COATED ORAL ONCE
Status: DISCONTINUED | OUTPATIENT
Start: 2023-11-24 | End: 2023-11-26 | Stop reason: HOSPADM

## 2023-11-24 RX ADMIN — NIFEDIPINE 60 MG: 60 TABLET, FILM COATED, EXTENDED RELEASE ORAL at 09:11

## 2023-11-24 RX ADMIN — HYDRALAZINE HYDROCHLORIDE 50 MG: 50 TABLET ORAL at 08:11

## 2023-11-24 RX ADMIN — HYDRALAZINE HYDROCHLORIDE 50 MG: 50 TABLET ORAL at 05:11

## 2023-11-24 RX ADMIN — DOXAZOSIN 4 MG: 4 TABLET ORAL at 08:11

## 2023-11-24 RX ADMIN — APIXABAN 5 MG: 5 TABLET, FILM COATED ORAL at 08:11

## 2023-11-24 RX ADMIN — ISOSORBIDE MONONITRATE 60 MG: 60 TABLET, EXTENDED RELEASE ORAL at 08:11

## 2023-11-24 RX ADMIN — TACROLIMUS 3 MG: 1 CAPSULE ORAL at 08:11

## 2023-11-24 RX ADMIN — METOPROLOL SUCCINATE 12.5 MG: 25 TABLET, EXTENDED RELEASE ORAL at 08:11

## 2023-11-24 RX ADMIN — TACROLIMUS 3 MG: 1 CAPSULE ORAL at 05:11

## 2023-11-24 RX ADMIN — FLUTICASONE FUROATE AND VILANTEROL TRIFENATATE 1 PUFF: 100; 25 POWDER RESPIRATORY (INHALATION) at 08:11

## 2023-11-24 RX ADMIN — GUAIFENESIN 600 MG: 600 TABLET, EXTENDED RELEASE ORAL at 08:11

## 2023-11-24 RX ADMIN — AMIODARONE HYDROCHLORIDE 200 MG: 200 TABLET ORAL at 08:11

## 2023-11-24 RX ADMIN — IPRATROPIUM BROMIDE AND ALBUTEROL SULFATE 3 ML: .5; 3 SOLUTION RESPIRATORY (INHALATION) at 04:11

## 2023-11-24 RX ADMIN — METOPROLOL SUCCINATE 12.5 MG: 25 TABLET, EXTENDED RELEASE ORAL at 09:11

## 2023-11-24 RX ADMIN — APIXABAN 5 MG: 5 TABLET, FILM COATED ORAL at 09:11

## 2023-11-24 RX ADMIN — IPRATROPIUM BROMIDE AND ALBUTEROL SULFATE 3 ML: .5; 3 SOLUTION RESPIRATORY (INHALATION) at 08:11

## 2023-11-24 RX ADMIN — HYDRALAZINE HYDROCHLORIDE 50 MG: 50 TABLET ORAL at 09:11

## 2023-11-24 RX ADMIN — DOXAZOSIN 4 MG: 4 TABLET ORAL at 09:11

## 2023-11-24 RX ADMIN — PREDNISONE 5 MG: 5 TABLET ORAL at 10:11

## 2023-11-24 RX ADMIN — ATORVASTATIN CALCIUM 40 MG: 40 TABLET, FILM COATED ORAL at 08:11

## 2023-11-24 RX ADMIN — CLOPIDOGREL BISULFATE 75 MG: 75 TABLET ORAL at 08:11

## 2023-11-24 RX ADMIN — FUROSEMIDE 80 MG: 10 INJECTION, SOLUTION INTRAVENOUS at 08:11

## 2023-11-24 RX ADMIN — NIFEDIPINE 60 MG: 60 TABLET, FILM COATED, EXTENDED RELEASE ORAL at 08:11

## 2023-11-24 RX ADMIN — GUAIFENESIN 600 MG: 600 TABLET, EXTENDED RELEASE ORAL at 09:11

## 2023-11-24 RX ADMIN — IPRATROPIUM BROMIDE AND ALBUTEROL SULFATE 3 ML: .5; 3 SOLUTION RESPIRATORY (INHALATION) at 01:11

## 2023-11-24 NOTE — CONSULTS
Food & Nutrition Education    Diet Education: Fluid and Salt restriction    Time Spent: 10 minutes    Learners: Patient    Handouts provided: Low Sodium Nutrition Therapy, Fluid-Restricted Nutrition Therapy    Comments: Discussed importance of a low sodium diet. Reviewed high sodium foods that should be avoided. Food labels, salt free seasonings, and recommended sodium intake reviewed. Encouraged healthy, fresh foods that are low in sodium that are good for consumption. Fluid intake and conversions discussed. Foods considered fluids were reviewed and encouraged to monitor. General healthy diet encouraged. All questions/concerns were addressed.    Follow-Up: Yes    Please Re-consult as needed.    Thanks!    Meli Blanton, MS, RD, LDN

## 2023-11-24 NOTE — PLAN OF CARE
Problem: Adult Inpatient Plan of Care  Goal: Plan of Care Review  Outcome: Ongoing, Progressing     Problem: Fall Injury Risk  Goal: Absence of Fall and Fall-Related Injury  Outcome: Ongoing, Progressing     Problem: Heart Failure Comorbidity  Goal: Maintenance of Heart Failure Symptom Control  Outcome: Ongoing, Progressing     Problem: Hypertension Comorbidity  Goal: Blood Pressure in Desired Range  Outcome: Ongoing, Progressing     Problem: Chest Pain  Goal: Resolution of Chest Pain Symptoms  Outcome: Ongoing, Progressing     Problem: Oral Intake Inadequate (Chronic Kidney Disease)  Goal: Optimal Oral Intake  Outcome: Ongoing, Progressing     Problem: Pain (Chronic Kidney Disease)  Goal: Acceptable Pain Control  Outcome: Ongoing, Progressing     Problem: Renal Function Impairment (Chronic Kidney Disease)  Goal: Minimize Renal Failure Effects  Outcome: Ongoing, Progressing

## 2023-11-24 NOTE — HOSPITAL COURSE
Ibrahima Phelps is a 68 y.o. male who was admitted to hospital medicine for CHF vs COPD exacerbation. BNP 1,718. Started on 80 IV lasix daily with cumulative output of ~1.5L. Resumed home oral lasix dose. Additionally started on dounebs q4H. Troponin elevated to 0.064 >> 0.076 >> 0.088 >> 0.093. Cardiology consulted, recommending continuing medical management, increased statin and imdur. Treating for COPD exacerbation with duo-nebs, prednisone, azithromycin. 6 minute walk test passed without need for supplemental O2. KTM consulted for KATINA on CKD V in transplanted kidney. Urinary retention/ oliguria early on it stay, but since resolved. Cr stabilized, but patient will likely need to initiate dialysis in the near future. IV iron provided for iron-deficiency anemia with associated chronic renal failure. KTM recommends second dose of IV feraheme outpatient and close follow up with nephrologist. Will need EPO initiation. Vascular surgery has pt scheduled for AVG placement in the next few weeks. Patient will be discharged with cardiology & KTM follow up.

## 2023-11-24 NOTE — HPI
"68 y.o. male w/ h/o HFpEF (60-65%), CAD s/p PCI (2006), pAF s/p SEC (03/2022), COPD, CKD s/p kidney transplant (2005); p/w CP & SOB.  Reports worsening nonproductive cough & SOB x3 days, worse on exertion. CP is sharp, R chest wall, nonradiating, intermittent, & usually occurs on exertion or when having a coughing fit; relieved w/ SL NG. Denies LE swelling. Denies missed doses of home meds. He was seen by Cards in 10/2023 for unstable angina; at the time he was adamantly against going back on HD, so LHC was deferred 2/2 high risk for DOUGLAS; he was medically managed per ACS protocol w/ DAPT & apixaban, subsequently completed ASA & has been on clopidogrel & apixaban prior to this admission. He was tachypneic & hypertensive on admission. Labs w/ elevated BNP (1718; b/l 184), elevated & uptrending trop (0.064 -> 0.066 -> 0.076 -> 0.070 -> 0.088 -> 0.093). ECG w/ initial NSR w/ LVH -> NSR w/ new ST depressions in II, V4-6 -> NSR w/ less pronounced ST changes. Admitted to [facility] for possible CHF vs COPD exacerbation.    Cardiology consulted for "trop uptrending. EKG with ST depressions".  "

## 2023-11-24 NOTE — SUBJECTIVE & OBJECTIVE
Interval History: VSS. Troponins continued to rise overnight, cardiology consulted. Plan to continue medical management. Resting comfortably this morning eating breakfast without chest pain.     Review of Systems   Constitutional:  Negative for activity change, chills and fever.   HENT:  Negative for trouble swallowing.    Eyes:  Negative for photophobia and visual disturbance.   Respiratory:  Positive for cough and shortness of breath. Negative for chest tightness and wheezing.    Cardiovascular:  Positive for chest pain. Negative for palpitations and leg swelling.   Gastrointestinal:  Negative for abdominal pain, constipation, diarrhea, nausea and vomiting.   Genitourinary:  Negative for dysuria, frequency, hematuria and urgency.   Musculoskeletal:  Negative for arthralgias, back pain and gait problem.   Skin:  Negative for color change and rash.   Neurological:  Negative for dizziness, syncope, weakness, light-headedness, numbness and headaches.   Psychiatric/Behavioral:  Negative for agitation and confusion. The patient is not nervous/anxious.      Objective:     Vital Signs (Most Recent):  Temp: 98.3 °F (36.8 °C) (11/24/23 0822)  Pulse: (!) 59 (11/24/23 1116)  Resp: 18 (11/24/23 0856)  BP: (!) 178/74 (11/24/23 1035)  SpO2: (!) 94 % (11/24/23 0856) Vital Signs (24h Range):  Temp:  [97.9 °F (36.6 °C)-98.3 °F (36.8 °C)] 98.3 °F (36.8 °C)  Pulse:  [59-86] 59  Resp:  [16-22] 18  SpO2:  [94 %-99 %] 94 %  BP: (135-178)/(59-87) 178/74     Weight: 90.7 kg (200 lb)  Body mass index is 26.39 kg/m².  No intake or output data in the 24 hours ending 11/24/23 1126      Physical Exam  Vitals and nursing note reviewed.   Constitutional:       General: He is not in acute distress.     Appearance: He is well-developed.   HENT:      Head: Normocephalic and atraumatic.      Mouth/Throat:      Pharynx: No oropharyngeal exudate.   Neck:      Vascular: JVD present.   Cardiovascular:      Rate and Rhythm: Normal rate.      Heart  sounds: Normal heart sounds.   Pulmonary:      Effort: Pulmonary effort is normal. No respiratory distress.      Breath sounds: Wheezing (occational) present.   Abdominal:      General: Bowel sounds are normal. There is no distension.      Palpations: Abdomen is soft.      Tenderness: There is no abdominal tenderness.   Musculoskeletal:         General: No tenderness. Normal range of motion.      Cervical back: Normal range of motion and neck supple.      Right lower leg: No edema.      Left lower leg: No edema.   Lymphadenopathy:      Cervical: No cervical adenopathy.   Skin:     General: Skin is warm and dry.      Capillary Refill: Capillary refill takes less than 2 seconds.      Findings: No rash.   Neurological:      Mental Status: He is alert and oriented to person, place, and time.      Cranial Nerves: No cranial nerve deficit.      Sensory: No sensory deficit.      Coordination: Coordination normal.   Psychiatric:         Behavior: Behavior normal.         Thought Content: Thought content normal.         Judgment: Judgment normal.             Significant Labs: All pertinent labs within the past 24 hours have been reviewed.  CBC:   Recent Labs   Lab 11/23/23  0819 11/24/23  0419   WBC 8.33 7.11   HGB 8.0* 7.6*   HCT 26.2* 23.6*   * 140*     CMP:   Recent Labs   Lab 11/23/23  0819 11/24/23  0419    142   K 4.1 4.5    108   CO2 22* 22*    103   BUN 41* 50*   CREATININE 5.6* 5.9*   CALCIUM 9.3 8.7   PROT 6.6 5.9*   ALBUMIN 3.0* 2.7*   BILITOT 0.4 0.3   ALKPHOS 60 54*   AST 25 22   ALT 21 17   ANIONGAP 12 12     Cardiac Markers:   Recent Labs   Lab 11/23/23  0819   BNP 1,718*     Troponin:   Recent Labs   Lab 11/23/23  1447 11/23/23  2138 11/24/23  0419   TROPONINI 0.070* 0.088* 0.093*       Significant Imaging: I have reviewed all pertinent imaging results/findings within the past 24 hours.

## 2023-11-24 NOTE — CONSULTS
Ibrahima Pehlps is a 68 y.o. male for whom KTM consult has been requested to evaluate and give opinion.     HPI:68 yr-old male s/p a kidney transplant 2005. Currently with a failing allograft near dialysis, in fact scheduled next week for dialysis access placement. Patient presented to the ER this morning with 2 days h/o chills, SOB and severe cough no fever. Labs on admission with very mildly elevated troponin, ECG showed A fib and ischemia and very high BNP. Chest x Ry showed opacification on lung bases BL. GFR low but unchanged. KTM called to address kidney function and immunosuppression. I spoke with patient and wife t the bedside.     PAST MEDICAL HISTORY:  He  has a past medical history of Atrial fibrillation, CAD (coronary artery disease) (2006), CHF (congestive heart failure) (2017), CKD (chronic kidney disease) stage 3, GFR 30-59 ml/min, COVID-19 (2/7/2023), Diverticulosis, Hyperlipidemia, Hypertension, Keloid cicatrix, Metabolic bone disease, Other emphysema (10/1/2019), Pericarditis, S/P kidney transplant (1992), Thrombocytopenia, Thyroid disease, and Tobacco use (09/05/2014).    PAST SURGICAL HISTORY:  He  has a past surgical history that includes Cholecystectomy; Cardioversion (04/30/15); Kidney transplant (2005); Parathyroidectomy; Colonoscopy (April 20, 2011); Colonoscopy (N/A, 3/13/2020); Colonoscopy (N/A, 2/4/2021); Colonoscopy (N/A, 3/3/2021); Coronary angioplasty with stent (9/13/2006); Treatment of cardiac arrhythmia (N/A, 3/28/2022); and irrigation and debridement (Right, 8/30/2023).    SOCIAL HISTORY:  He  reports that he has been smoking cigarettes. He started smoking about 41 years ago. He has a 20.0 pack-year smoking history. He has never used smokeless tobacco. He reports that he does not drink alcohol and does not use drugs.    FAMILY MEDICAL HISTORY:  His family history includes Heart disease in his father; Kidney disease in his brother and sister; Kidney failure in his brother and sister;  No Known Problems in his brother, sister, sister, and sister; Thyroid disease in his mother.    Review of patient's allergies indicates:   Allergen Reactions    Ace inhibitors Swelling    Verapamil Other (See Comments)     Other reaction(s): Unknown    Povidone-iodine Itching        albuterol-ipratropium  3 mL Nebulization Q4H WAKE    amiodarone  200 mg Oral Daily    apixaban  5 mg Oral BID    atorvastatin  40 mg Oral Daily    clopidogreL  75 mg Oral Daily    doxazosin  4 mg Oral Q12H    fluticasone furoate-vilanteroL  1 puff Inhalation Daily    furosemide (LASIX) injection  80 mg Intravenous BID    guaiFENesin  600 mg Oral BID    hydrALAZINE  50 mg Oral TID    isosorbide mononitrate  60 mg Oral Daily    metoprolol succinate  12.5 mg Oral BID    NIFEdipine  60 mg Oral BID    [START ON 11/24/2023] predniSONE  5 mg Oral Daily    tacrolimus  3 mg Oral BID       Prior to Admission medications    Medication Sig Start Date End Date Taking? Authorizing Provider   acetaminophen (TYLENOL) 500 MG tablet Take 1 tablet (500 mg total) by mouth every 6 (six) hours as needed for Pain. 12/25/22  Yes Nato Portillo MD   albuterol (PROVENTIL) 2.5 mg /3 mL (0.083 %) nebulizer solution Take 3 mLs (2.5 mg total) by nebulization every 6 (six) hours as needed for Wheezing. Rescue 1/30/23 1/30/24 Yes Connie Magdaleno MD   albuterol (VENTOLIN HFA) 90 mcg/actuation inhaler Inhale 2 puffs into the lungs every 6 (six) hours as needed for Wheezing or Shortness of Breath. Rescue 4/18/22  Yes Connie Magdaleno MD   amiodarone (PACERONE) 200 MG Tab Take 1 tablet (200 mg total) by mouth once daily. 9/20/23 9/19/24 Yes BE Parmar MD   apixaban (ELIQUIS) 5 mg Tab Take 1 tablet (5 mg total) by mouth 2 (two) times daily. 7/31/23  Yes Hussain Vail MD PhD   atorvastatin (LIPITOR) 40 MG tablet Take 1 tablet (40 mg total) by mouth once daily. 10/21/23 10/20/24 Yes Randal Beaver MD   b complex vitamins (B COMPLEX-VITAMIN B12) tablet Take 1 tablet  by mouth once daily. 6/29/23  Yes Marcos Brunson MD   calcium acetate,phosphat bind, (PHOSLO) 667 mg capsule TAKE 1 CAPSULE BY MOUTH THREE TIMES A DAY WITH MEALS AND SNACKS 11/15/23  Yes Emre Latif MD   calcium carbonate (CALCIUM 600 ORAL) Take 1 tablet by mouth 2 (two) times a day.   Yes Provider, Historical   CHOLECALCIFEROL, VITAMIN D3, ORAL Take 2 tablets by mouth 2 (two) times daily.   Yes Provider, Historical   clopidogreL (PLAVIX) 75 mg tablet Take 1 tablet (75 mg total) by mouth once daily. 10/21/23 10/20/24 Yes Randal Beaver MD   doxazosin (CARDURA) 4 MG tablet Take 1 tablet (4 mg total) by mouth every 12 (twelve) hours. 7/31/23  Yes Anatoliy Colindres MD   famotidine (PEPCID) 20 MG tablet Take 1 tablet (20 mg total) by mouth 2 (two) times daily. 8/1/23  Yes Emre Latif MD   fexofenadine HCl (ALLEGRA ORAL) Take 1 tablet by mouth daily as needed (allergies).   Yes Provider, Historical   fluticasone propionate (FLONASE) 50 mcg/actuation nasal spray 1 spray (50 mcg total) by Each Nostril route daily as needed for Allergies. 10/20/23  Yes Randal Beaver MD   FLUZONE HIGHDOSE QUAD 23-24  mcg/0.7 mL Syrg  9/1/23  Yes Provider, Historical   furosemide (LASIX) 20 MG tablet One po QAM, and one po at lunchtime if swelling prn 11/1/23  Yes Emre Latif MD   furosemide (LASIX) 80 MG tablet Take 1 tablet (80 mg total) by mouth every morning. 11/6/23 11/5/24 Yes Emre Latif MD   gabapentin (NEURONTIN) 100 MG capsule Take 1 capsule (100 mg total) by mouth as needed (pain). 10/20/23 10/19/24 Yes Randal Beaver MD   hydrALAZINE (APRESOLINE) 50 MG tablet TAKE 1 TABLET (50 MG TOTAL) BY MOUTH 3 (THREE) TIMES DAILY. 10/24/22  Yes Emre Latif MD   isosorbide mononitrate (IMDUR) 30 MG 24 hr tablet Take 2 tablets (60 mg total) by mouth once daily. 10/21/23 10/20/24 Randal Worrell MD   metoprolol succinate (TOPROL-XL) 25 MG 24 hr tablet Take 1 tablet (25 mg total) by  mouth once daily.  Patient taking differently: Take 12.5 mg by mouth 2 (two) times daily. 8/21/23 8/20/24 Yes Anatoliy Colindres MD   MITIGARE 0.6 mg Cap TAKE 1 CAPSULE BY MOUTH TWICE A DAY AS NEEDED GOUT FLARE UP TO 3 DAYS 7/11/23  Yes Emre Latif MD   multivitamin (THERAGRAN) per tablet Take 1 tablet by mouth Daily. 6/11/12  Yes Provider, Historical   NIFEdipine (PROCARDIA-XL) 60 MG (OSM) 24 hr tablet Take 1 tablet (60 mg total) by mouth 2 (two) times a day. 7/31/23 7/30/24 Yes Anatoliy Colindres MD   nitroGLYCERIN (NITROSTAT) 0.4 MG SL tablet Place 1 tablet (0.4 mg total) under the tongue every 5 (five) minutes as needed for Chest pain. 10/20/23 10/19/24 Yes Randal Beaver MD   omeprazole (PRILOSEC) 20 MG capsule Take 1 capsule (20 mg total) by mouth daily as needed (heartburn). 10/20/23 10/19/24 Yes Randal Beaver MD   paricalcitoL (ZEMPLAR) 1 MCG capsule TAKE 1 CAPSULE ON MONDAY, WEDNESDAY AND FRIDAY 8/1/23  Yes Emre Latif MD   PFIZER COVID BIVAL,12Y UP,,PF, 30 mcg/0.3 mL injection Inject into the muscle. 9/1/23  Yes Provider, Historical   potassium chloride (KLOR-CON) 10 MEQ TbSR Take 1 tablet (10 mEq total) by mouth daily as needed (to be taken with lasix)., 11/2/23  Yes Anatoliy Colindres MD   predniSONE (DELTASONE) 5 MG tablet TAKE 1 TABLET BY MOUTH EVERY DAY IN THE MORNING 3/8/23  Yes Emre Latif MD   pulse oximeter (PULSE OXIMETER) device by Apply Externally route 2 (two) times a day. Use twice daily at 8 AM and 3 PM and record the value in MyChart as directed. 2/10/23  Yes Kt Luna III, MD   tacrolimus (PROGRAF) 1 MG Cap Take 3 capsules (3 mg total) by mouth every 12 (twelve) hours. 11/1/23 10/31/24 Yes Emre Latif MD   fluticasone propion-salmeterol 115-21 mcg/dose (ADVAIR HFA) 115-21 mcg/actuation HFAA inhaler Inhale 2 puffs into the lungs every 12 (twelve) hours. Controller  Patient taking differently: Inhale 2 puffs into the lungs 2 (two) times  "daily as needed (shortness of breath). Controller 9/30/22 11/6/23  Sean Hdez MD        REVIEW OF SYSTEMS:  Patient has no fever, fatigue, visual changes, chest pain, edema, cough, dyspnea, nausea, vomiting, constipation, diarrhea, arthralgias, pruritis, dizziness, weakness, depression, confusion.    No intake or output data in the 24 hours ending 11/23/23 1902    PHYSICAL EXAM:   height is 6' 1" (1.854 m) and weight is 90.9 kg (200 lb 6.4 oz). His temperature is 97.9 °F (36.6 °C). His blood pressure is 135/59 (abnormal) and his pulse is 73. His respiration is 18 and oxygen saturation is 97%.   Gen: WDWN male in no apparent distress  Psych: Normal mood and affect  Skin: No rashes or ulcers  Eyes: Normal conjunctiva and lids, PERRLA  ENT: Normal hearing with no oropharyngeal lesions  Neck: No JVD  Chest: Clear with no rales, rhonchi, some scattered wheezing both bases with normal effort  CV: Regular with no murmurs, gallops or rubs  Abd: Soft, nontender, no distension, positive bowel sounds  Ext: No cyanosis, clubbing or edema    Recent Labs   Lab 11/23/23  0819      K 4.1      CO2 22*   BUN 41*   CREATININE 5.6*   CALCIUM 9.3     Recent Labs   Lab 11/23/23  0819   WBC 8.33   HGB 8.0*   HCT 26.2*   *       IMPRESSION AND RECOMMENDATIONS:  ckd stage 5 predialysis  Failing allograft   Immunosuppression encounter high risk medication  Mild acidosis   Anemia of ckd     Plan   Re-start tacro at home dose  Iron stores to decide IV iron and or ISAURA  Evaluation by cardiology  Monitor GFR in house  Daily labs and daily tacrolimus level    I spoke with patient and wife   All questions were answered.                 "

## 2023-11-24 NOTE — ASSESSMENT & PLAN NOTE
- intermittent chest pain   - continue ASA and imdur  - EKG in NSR with LVH   - troponin 0.064 >> 0.076, continue to trend   - troponins elevated and continuing to rise  - cardiology consulted   - no plans for invasive w/u or interventions at this time  -continue medical management w/ clopidogrel & apixaban  -diurese as tolerated by kidney function  -continue metoprolol at current dose; would not increase while in suspected CHF exac until return to euvolemia  -increase isosorbide mononitrate to 120 mg qd  -increase atorvastatin to 80 mg qd (last LDL 89; goal LDL < 55)  -assess volume status qd  -strict I/Os, daily weights, fluid restriction  -remainder of care per primary team

## 2023-11-24 NOTE — NURSING
Pt called nurse to room r/t chest pain 8/10 on pain scale. Reports mid chest pain. Nitro sl X1 administered, states relief, pain coming down, STAT EKG ordered. ON call PA made aware of results. Pt expresses relief from Nitro. Will continue to monitor. Vitals stable..

## 2023-11-24 NOTE — PROGRESS NOTES
[facility]  Transplant Nephrology  Progress Note    Patient Name: Ibrahima Phelps  MRN: [id number]  Admission Date: 11/23/2023  Hospital Length of Stay: 0 days  Attending Provider: Nadira Shaffer MD   Primary Care Physician: Anatoliy Colindres MD  Principal Problem:Acute on chronic heart failure with preserved ejection fraction    Consults  Subjective:     Interval History: patient seen sleeping. When woken up, patient denies any chest pain or SOB. States that he is feeling well now. Would like to go home.     Review of patient's allergies indicates:   Allergen Reactions    Ace inhibitors Swelling    Verapamil Other (See Comments)     Other reaction(s): Unknown    Povidone-iodine Itching     Current Facility-Administered Medications   Medication Frequency    acetaminophen tablet 650 mg Q8H PRN    acetaminophen tablet 650 mg Q4H PRN    albuterol-ipratropium 2.5 mg-0.5 mg/3 mL nebulizer solution 3 mL Q4H WAKE    aluminum-magnesium hydroxide-simethicone 200-200-20 mg/5 mL suspension 30 mL QID PRN    amiodarone tablet 200 mg Daily    apixaban tablet 5 mg BID    atorvastatin tablet 40 mg Once    [START ON 11/25/2023] atorvastatin tablet 80 mg Daily    clopidogreL tablet 75 mg Daily    dextrose 10% bolus 125 mL 125 mL PRN    dextrose 10% bolus 250 mL 250 mL PRN    dextrose 40 % gel 16,000 mg PRN    dextrose 40 % gel 24,000 mg PRN    doxazosin tablet 4 mg Q12H    fluticasone furoate-vilanteroL 100-25 mcg/dose diskus inhaler 1 puff Daily    [START ON 11/25/2023] furosemide injection 80 mg Daily    glucagon (human recombinant) injection 1 mg PRN    guaiFENesin 12 hr tablet 600 mg BID    hydrALAZINE tablet 50 mg TID    [START ON 11/25/2023] isosorbide mononitrate 24 hr tablet 120 mg Daily    isosorbide mononitrate 24 hr tablet 60 mg Once    melatonin tablet 6 mg Nightly PRN    metoprolol succinate (TOPROL-XL) 24 hr split tablet 12.5 mg BID    naloxone 0.4 mg/mL injection 0.02 mg PRN    NIFEdipine 24 hr tablet 60 mg  BID    nitroGLYCERIN SL tablet 0.4 mg Q5 Min PRN    ondansetron disintegrating tablet 8 mg Q8H PRN    ondansetron injection 4 mg Q8H PRN    polyethylene glycol packet 17 g BID PRN    predniSONE tablet 5 mg Daily    simethicone chewable tablet 80 mg QID PRN    sodium chloride 0.9% flush 10 mL PRN    sodium chloride 0.9% flush 10 mL PRN    tacrolimus capsule 3 mg BID       Objective:     Vital Signs (Most Recent):  Temp: 98.3 °F (36.8 °C) (11/24/23 0822)  Pulse: (!) 59 (11/24/23 1116)  Resp: 18 (11/24/23 0856)  BP: (!) 178/74 (11/24/23 1035)  SpO2: (!) 94 % (11/24/23 0856) Vital Signs (24h Range):  Temp:  [97.9 °F (36.6 °C)-98.3 °F (36.8 °C)] 98.3 °F (36.8 °C)  Pulse:  [59-86] 59  Resp:  [16-22] 18  SpO2:  [94 %-99 %] 94 %  BP: (135-178)/(59-87) 178/74     Weight: 90.7 kg (200 lb) (11/24/23 1035)  Body mass index is 26.39 kg/m².  Body surface area is 2.16 meters squared.    No intake/output data recorded.    Physical Exam  Vitals reviewed.   Constitutional:       General: He is not in acute distress.     Appearance: Normal appearance. He is not ill-appearing or toxic-appearing.      Comments: Appears stated age   HENT:      Head: Normocephalic and atraumatic.      Right Ear: External ear normal.      Left Ear: External ear normal.      Nose: Nose normal.   Eyes:      General: No scleral icterus.     Conjunctiva/sclera: Conjunctivae normal.   Cardiovascular:      Rate and Rhythm: Normal rate and regular rhythm.      Pulses: Normal pulses.      Heart sounds: Normal heart sounds. No murmur heard.     No friction rub.   Pulmonary:      Effort: Pulmonary effort is normal. No respiratory distress.      Breath sounds: Normal breath sounds. No wheezing or rhonchi.   Abdominal:      General: Bowel sounds are normal. There is no distension.      Palpations: Abdomen is soft.      Tenderness: There is no abdominal tenderness. There is no guarding.   Musculoskeletal:         General: No swelling or deformity. Normal range of  motion.      Cervical back: Normal range of motion and neck supple. No tenderness.      Right lower leg: No edema.      Left lower leg: No edema.   Skin:     General: Skin is warm and dry.      Coloration: Skin is not jaundiced.      Findings: No erythema or rash.   Neurological:      General: No focal deficit present.      Mental Status: He is alert and oriented to person, place, and time.      Motor: No weakness.   Psychiatric:         Mood and Affect: Mood normal.         Behavior: Behavior normal.         Assessment/Plan:   67 yr old AAM with hx of kidney transplant in 2005 (bcrof 2.8-3.2), COPD, CAD s/p CABG in 2006 with active smoking, AFIB on amiodarone/Eliquis, HTN who is admitted for chest pain and shortness of breath     1) Unstable angina  3) s/p kidney transplant  4) Immunosuppression  5) CKD stage V  6) CAD s/p CABG  7) COPD  8) AFIB on Eliquis  9) HTN  10) Anemia    - remains with advanced but stable CKD stage V. No emergent need for HD at this time. Set up for AVF surgery next week. Defer to his nephrologist regarding appropriate timing for initiation of HD  - iron studies noted. Will give a dose of Feraheme if patient is not to be discharged today  - defer to cardiology regarding medical management for angina. Noted to have increased doses of his existing medications.  - acceptable FK trough (10 hr). Cont on FK 3 mg BID  - cont on prednisone 5 mg daily      Tasia Conroy DO  Nephrology  [facility]

## 2023-11-24 NOTE — NURSING

## 2023-11-24 NOTE — ASSESSMENT & PLAN NOTE
68 y.o. male w/ h/o HFpEF (60-65%), CAD s/p PCI (2006), pAF s/p SEC (03/2022), COPD, CKD s/p kidney transplant (2005); p/w CP & SOB. Recently seen by Cards in 10/2023 for unstable angina; deferred [facility] 2/2 high risk for DOUGLAS & pt not wanting to be back on HD, so he was medically managed w/ triple therapy, subsequently kept on clopidogrel & apixaban until this admission. CP is exertional, sharp, & R sided, so atypical; however relieved w/ NG. Hypertensive, no LE edema, lungs CTAB w/ intermittent expiratory wheezing, JVD present. Trop uptrending.     Upon further review, ECG on 11/23 11:48 initially read as AF was interpreted independently by staff Dr Millan to not be AF w/ ST changes more pronounced compared w/ prior presumed baseline ST changes, then less pronounced on subsequent ECGs. 10/15 TTE w/ EF 60-65%, G2 DD, severe LA/RA dilation, mild AS, PA SP 59, CVP 3. 09/13 PET w/ small mild intensity @ apical inferior/inferoseptal reversible perfusion abnormality involving 6% of LV myocardium indication of focal coronary stenosis; severe diffuse coronary calcifications in LAD, moderate diffuse coronary calcifications in LCX & RCA, mild diffuse aortic calcifications in descending aorta; EF 53% @ rest, 56% @ stress.    Suspect NSTEMI 2/2 CHF vs COPD exac. In s/o worsening kidney function & prior PET results, would not strongly pursue [facility] 2/2 high risk of DOUGLAS.    Recommendations:  -no plans for invasive w/u or interventions at this time  -continue medical management w/ clopidogrel & apixaban  -diurese as tolerated by kidney function  -continue metoprolol at current dose; would not increase while in suspected CHF exac until return to euvolemia  -increase isosorbide mononitrate to 120 mg qd  -increase atorvastatin to 80 mg qd (last LDL 89; goal LDL < 55)  -assess volume status qd  -strict I/Os, daily weights, fluid restriction  -remainder of care per primary team

## 2023-11-24 NOTE — PROGRESS NOTES
"[facility]) nurse navigator notified of H[facility] candidate in need of education and introduction to 4-6 week program.      PT aao x 3 while lying in bed NVD, breathing regular unlabored. Introduced self to pt as [facility] nurse navigator.     Patient given "[facility] Home Care Guide" which includes "Daily weight and symptom tracker".  Encouraged pt to review information.      Reviewed the following key points of [facility] program with pt and family:   1.) Take your medications as directed.    2.) Weight yourself daily   3.) Follow low salt and limited fluid diet.    4.) Stop smoking and start exercising   5.) Go to your appointments and call your team.      Pt reminded to follow Symptom tracker and to call at the onset of symptoms according to tracker.     Reviewed plan for follow up once discharged to include phone calls, in person and virtual visits to assist pt optimizing their heart failure medication regimen and encouraging healthy lifestyle modifications.  Reminded pt that program will assist them over the next 4-6 weeks and then patient will be transferred to long term care provider .  Reminded pt how to contact HFT[facility] navigator via phone and or via luma-id.     Pt instructed appointment will be printed on hospital discharge paperwork.     Pt also reminded RN will call 48-72 hours after discharge to check on them.     Pt verbalize read back of information given.  Encouraged pt and family to read over information often and contact team with any questions or concerns.   "

## 2023-11-24 NOTE — PROGRESS NOTES
"[facility] [address] [place] [address] Medicine  Progress Note    Patient Name: Ibrahima Phelps  MRN: [id number]  Patient Class: OP- Observation   Admission Date: 11/23/2023  Length of Stay: 0 days  Attending Physician: Nadira Shaffer MD  Primary Care Provider: Anatoliy Colindres MD        Subjective:     Principal Problem:Acute on chronic heart failure with preserved ejection fraction        HPI:  Ibrahima Phelps is a 68 y.o. male with a PMH of HFpEF, COPD, a-fib, CKD s/p renal transplant, CAD who presented to the ED with complaints of SOB and CP. States that he has had a worsening cough and SOB x 3 days. The cough is nonproductive. The CP and SOB are worse when he get "agitated". He has not missed any doses of lasix recently. He is unsure if the SOB is due to his COPD or CHF. Denies fevers, chills, runny nose, nausea, vomiting, diarrhea or recent illness exposure.     ED: Hypertensive and tachypneic on arrival. CBC without leukocytosis but with chronic stable anemia. Cr 5.6 (baseline). BNP elevated to 1718. Troponin 0.064 >> 0.076. EKG in NSR with LVH. CXR with improved but persistent increased parenchymal opacification at the bases. Given 40 of IV lasix, 3 dounebs and 60mg of prednisone with improvement. Patient was admitted to hospital medicine for further care.     Overview/Hospital Course:  Ibrahima Phelps is a 68 y.o. male who was admitted to hospital medicine for CHF vs COPD exacerbation. BNP 1718. Started on 80 IV lasix daily with output of 1L. Additionally started on dounebs q4H. Initial troponin elevated to 0.064 >> 0.076 >> 0.088 >> 0.093. Cardiology consulted, recommending continuing medical management, increase statin and increase imdur. 6 minute walk test ordered. Patient will be discharged when medically ready with cardiology follow up.       Interval History: VSS. Troponins continued to rise overnight, cardiology consulted. Plan to continue medical management. Resting comfortably this morning eating breakfast " without chest pain.     Review of Systems   Constitutional:  Negative for activity change, chills and fever.   HENT:  Negative for trouble swallowing.    Eyes:  Negative for photophobia and visual disturbance.   Respiratory:  Positive for cough and shortness of breath. Negative for chest tightness and wheezing.    Cardiovascular:  Positive for chest pain. Negative for palpitations and leg swelling.   Gastrointestinal:  Negative for abdominal pain, constipation, diarrhea, nausea and vomiting.   Genitourinary:  Negative for dysuria, frequency, hematuria and urgency.   Musculoskeletal:  Negative for arthralgias, back pain and gait problem.   Skin:  Negative for color change and rash.   Neurological:  Negative for dizziness, syncope, weakness, light-headedness, numbness and headaches.   Psychiatric/Behavioral:  Negative for agitation and confusion. The patient is not nervous/anxious.      Objective:     Vital Signs (Most Recent):  Temp: 98.3 °F (36.8 °C) (11/24/23 0822)  Pulse: (!) 59 (11/24/23 1116)  Resp: 18 (11/24/23 0856)  BP: (!) 178/74 (11/24/23 1035)  SpO2: (!) 94 % (11/24/23 0856) Vital Signs (24h Range):  Temp:  [97.9 °F (36.6 °C)-98.3 °F (36.8 °C)] 98.3 °F (36.8 °C)  Pulse:  [59-86] 59  Resp:  [16-22] 18  SpO2:  [94 %-99 %] 94 %  BP: (135-178)/(59-87) 178/74     Weight: 90.7 kg (200 lb)  Body mass index is 26.39 kg/m².  No intake or output data in the 24 hours ending 11/24/23 1126      Physical Exam  Vitals and nursing note reviewed.   Constitutional:       General: He is not in acute distress.     Appearance: He is well-developed.   HENT:      Head: Normocephalic and atraumatic.      Mouth/Throat:      Pharynx: No oropharyngeal exudate.   Neck:      Vascular: JVD present.   Cardiovascular:      Rate and Rhythm: Normal rate.      Heart sounds: Normal heart sounds.   Pulmonary:      Effort: Pulmonary effort is normal. No respiratory distress.      Breath sounds: Wheezing (occational) present.   Abdominal:       General: Bowel sounds are normal. There is no distension.      Palpations: Abdomen is soft.      Tenderness: There is no abdominal tenderness.   Musculoskeletal:         General: No tenderness. Normal range of motion.      Cervical back: Normal range of motion and neck supple.      Right lower leg: No edema.      Left lower leg: No edema.   Lymphadenopathy:      Cervical: No cervical adenopathy.   Skin:     General: Skin is warm and dry.      Capillary Refill: Capillary refill takes less than 2 seconds.      Findings: No rash.   Neurological:      Mental Status: He is alert and oriented to person, place, and time.      Cranial Nerves: No cranial nerve deficit.      Sensory: No sensory deficit.      Coordination: Coordination normal.   Psychiatric:         Behavior: Behavior normal.         Thought Content: Thought content normal.         Judgment: Judgment normal.             Significant Labs: All pertinent labs within the past 24 hours have been reviewed.  CBC:   Recent Labs   Lab 11/23/23  0819 11/24/23 0419   WBC 8.33 7.11   HGB 8.0* 7.6*   HCT 26.2* 23.6*   * 140*     CMP:   Recent Labs   Lab 11/23/23  0819 11/24/23 0419    142   K 4.1 4.5    108   CO2 22* 22*    103   BUN 41* 50*   CREATININE 5.6* 5.9*   CALCIUM 9.3 8.7   PROT 6.6 5.9*   ALBUMIN 3.0* 2.7*   BILITOT 0.4 0.3   ALKPHOS 60 54*   AST 25 22   ALT 21 17   ANIONGAP 12 12     Cardiac Markers:   Recent Labs   Lab 11/23/23  0819   BNP 1,718*     Troponin:   Recent Labs   Lab 11/23/23  1447 11/23/23  2138 11/24/23 0419   TROPONINI 0.070* 0.088* 0.093*       Significant Imaging: I have reviewed all pertinent imaging results/findings within the past 24 hours.    Assessment/Plan:      * Acute on chronic heart failure with preserved ejection fraction  Patient is identified as having Diastolic (HFpEF) heart failure that is Acute on chronic. CHF is currently uncontrolled due to JVD and Dyspnea not returned to baseline after 2 doses  of IV diuretic. Latest ECHO performed and demonstrates- Results for orders placed during the hospital encounter of 10/13/23    Echo    Interpretation Summary    Left Ventricle: The left ventricle is normal in size. Ventricular mass is normal. Normal wall thickness. Normal wall motion. There is normal systolic function with a visually estimated ejection fraction of 60 - 65%. Grade II diastolic dysfunction.    Left Atrium: Left atrium is severely dilated.    Right Ventricle: Normal right ventricular cavity size. Wall thickness is normal. Right ventricle wall motion  is normal. Systolic function is normal.    Right Atrium: Right atrium is dilated.    Aortic Valve: Moderately calcified cusps. There is moderate annular calcification present. Mildly restricted motion. There is mild stenosis. Aortic valve area by VTI is 1.85 cm². Aortic valve peak velocity is 2.12 m/s. Mean gradient is 9 mmHg. The dimensionless index is 0.49. There is mild to moderate aortic regurgitation.    Pulmonary Artery: The estimated pulmonary artery systolic pressure is 59 mmHg.    IVC/SVC: Normal venous pressure at 3 mmHg.  . Continue Beta Blocker, ACE/ARB, and Furosemide and monitor clinical status closely. Monitor on telemetry. Patient is on CHF pathway.  Monitor strict Is&Os and daily weights.  Place on fluid restriction of 1.5 L. Cardiology has not been consulted. Continue to stress to patient importance of self efficacy and  on diet for CHF. Last BNP reviewed- and noted below   Recent Labs   Lab 11/23/23  0819   BNP 1,718*   .    CKD (chronic kidney disease) stage 5, GFR less than 15 ml/min  Creatine stable for now. BMP reviewed- noted Estimated Creatinine Clearance: 14.1 mL/min (A) (based on SCr of 5.6 mg/dL (H)). according to latest data. Based on current GFR, CKD stage is stage 5 - GFR < 15.  Monitor UOP and serial BMP and adjust therapy as needed. Renally dose meds. Avoid nephrotoxic medications and procedures.    Chest pain  -  intermittent chest pain   - continue ASA and imdur  - EKG in NSR with LVH   - troponin 0.064 >> 0.076, continue to trend   - troponins elevated and continuing to rise  - cardiology consulted   - no plans for invasive w/u or interventions at this time  -continue medical management w/ clopidogrel & apixaban  -diurese as tolerated by kidney function  -continue metoprolol at current dose; would not increase while in suspected CHF exac until return to euvolemia  -increase isosorbide mononitrate to 120 mg qd  -increase atorvastatin to 80 mg qd (last LDL 89; goal LDL < 55)  -assess volume status qd  -strict I/Os, daily weights, fluid restriction  -remainder of care per primary team    Pulmonary HTN  - history of pulm HTN   - Results for orders placed during the hospital encounter of 10/13/23    Echo    Interpretation Summary    Left Ventricle: The left ventricle is normal in size. Ventricular mass is normal. Normal wall thickness. Normal wall motion. There is normal systolic function with a visually estimated ejection fraction of 60 - 65%. Grade II diastolic dysfunction.    Left Atrium: Left atrium is severely dilated.    Right Ventricle: Normal right ventricular cavity size. Wall thickness is normal. Right ventricle wall motion  is normal. Systolic function is normal.    Right Atrium: Right atrium is dilated.    Aortic Valve: Moderately calcified cusps. There is moderate annular calcification present. Mildly restricted motion. There is mild stenosis. Aortic valve area by VTI is 1.85 cm². Aortic valve peak velocity is 2.12 m/s. Mean gradient is 9 mmHg. The dimensionless index is 0.49. There is mild to moderate aortic regurgitation.    Pulmonary Artery: The estimated pulmonary artery systolic pressure is 59 mmHg.    IVC/SVC: Normal venous pressure at 3 mmHg.      Chronic obstructive pulmonary disease  Patient's COPD is with exacerbation noted by continued dyspnea currently.  Patient is currently off COPD Pathway. Continue  scheduled inhalers Supplemental oxygen and monitor respiratory status closely.   - unsure if SOB is due to COPD exacerbation vs CHF   - dounebs q4h awake   - continue home prednisone daily   - guanfacine for cough       Paroxysmal atrial fibrillation  Patient with Paroxysmal (<7 days) atrial fibrillation which is controlled currently with Beta Blocker and Amiodarone. Patient is currently in sinus rhythm.[id number] Score: 2. Anticoagulation indicated. Anticoagulation done with eliquis .    H/O kidney transplant  - history of renal transplant   - KTM consulted   - continue tacro and prednisone      Mixed hyperlipidemia  - continue statin       CAD (coronary artery disease)  Patient with known CAD s/p stent placement, which is controlled Will continue ASA, Plavix, and Statin and monitor for S/Sx of angina/ACS. Continue to monitor on telemetry.     Primary hypertension  Chronic, controlled. Latest blood pressure and vitals reviewed-     Temp:  [98 °F (36.7 °C)-98.1 °F (36.7 °C)]   Pulse:  [63-86]   Resp:  [16-24]   BP: (148-174)/(68-87)   SpO2:  [93 %-100 %] .   Home meds for hypertension were reviewed and noted below.   Hypertension Medications               doxazosin (CARDURA) 4 MG tablet Take 1 tablet (4 mg total) by mouth every 12 (twelve) hours.    furosemide (LASIX) 20 MG tablet One po QAM, and one po at lunchtime if swelling prn    furosemide (LASIX) 80 MG tablet Take 1 tablet (80 mg total) by mouth every morning.    hydrALAZINE (APRESOLINE) 50 MG tablet TAKE 1 TABLET (50 MG TOTAL) BY MOUTH 3 (THREE) TIMES DAILY.    isosorbide mononitrate (IMDUR) 30 MG 24 hr tablet Take 2 tablets (60 mg total) by mouth once daily.    metoprolol succinate (TOPROL-XL) 25 MG 24 hr tablet Take 1 tablet (25 mg total) by mouth once daily.    NIFEdipine (PROCARDIA-XL) 60 MG (OSM) 24 hr tablet Take 1 tablet (60 mg total) by mouth 2 (two) times a day.    nitroGLYCERIN (NITROSTAT) 0.4 MG SL tablet Place 1 tablet (0.4 mg total) under the  tongue every 5 (five) minutes as needed for Chest pain.            While in the hospital, will manage blood pressure as follows; Continue home antihypertensive regimen    Will utilize p.r.n. blood pressure medication only if patient's blood pressure greater than 180/110 and he develops symptoms such as worsening chest pain or shortness of breath.      VTE Risk Mitigation (From admission, onward)           Ordered     apixaban tablet 5 mg  2 times daily         11/23/23 0804                    Discharge Planning   RAMU: 11/25/2023     Code Status: Full Code   Is the patient medically ready for discharge?: No    Reason for patient still in hospital (select all that apply): Patient new problem, Patient trending condition, Laboratory test, and Treatment  Discharge Plan A: Home                  Wendy Clements PA-C  Department of Hospital Medicine   [facility]

## 2023-11-24 NOTE — PLAN OF CARE
Problem: Adult Inpatient Plan of Care  Goal: Plan of Care Review  Outcome: Ongoing, Progressing  Goal: Patient-Specific Goal (Individualized)  Outcome: Ongoing, Progressing  Goal: Absence of Hospital-Acquired Illness or Injury  Outcome: Ongoing, Progressing  Goal: Optimal Comfort and Wellbeing  Outcome: Ongoing, Progressing  Goal: Readiness for Transition of Care  Outcome: Ongoing, Progressing     Problem: Infection  Goal: Absence of Infection Signs and Symptoms  Outcome: Ongoing, Progressing     Problem: Fluid and Electrolyte Imbalance (Acute Kidney Injury/Impairment)  Goal: Fluid and Electrolyte Balance  Outcome: Ongoing, Progressing     Problem: Oral Intake Inadequate (Acute Kidney Injury/Impairment)  Goal: Optimal Nutrition Intake  Outcome: Ongoing, Progressing     Problem: Renal Function Impairment (Acute Kidney Injury/Impairment)  Goal: Effective Renal Function  Outcome: Ongoing, Progressing     Problem: Fall Injury Risk  Goal: Absence of Fall and Fall-Related Injury  Outcome: Ongoing, Progressing     Problem: COPD (Chronic Obstructive Pulmonary Disease) Comorbidity  Goal: Maintenance of COPD Symptom Control  Outcome: Ongoing, Progressing     Problem: Heart Failure Comorbidity  Goal: Maintenance of Heart Failure Symptom Control  Outcome: Ongoing, Progressing     Problem: Hypertension Comorbidity  Goal: Blood Pressure in Desired Range  Outcome: Ongoing, Progressing     Problem: Chest Pain  Goal: Resolution of Chest Pain Symptoms  Outcome: Ongoing, Progressing     Problem: Adjustment to Illness (Chronic Kidney Disease)  Goal: Optimal Coping with Chronic Illness  Outcome: Ongoing, Progressing     Problem: Electrolyte Imbalance (Chronic Kidney Disease)  Goal: Electrolyte Balance  Outcome: Ongoing, Progressing     Problem: Fluid Volume Excess (Chronic Kidney Disease)  Goal: Fluid Balance  Outcome: Ongoing, Progressing     Problem: Functional Decline (Chronic Kidney Disease)  Goal: Optimal Functional  Ability  Outcome: Ongoing, Progressing     Problem: Hematologic Alteration (Chronic Kidney Disease)  Goal: Absence of Anemia Signs and Symptoms  Outcome: Ongoing, Progressing     Problem: Oral Intake Inadequate (Chronic Kidney Disease)  Goal: Optimal Oral Intake  Outcome: Ongoing, Progressing     Problem: Pain (Chronic Kidney Disease)  Goal: Acceptable Pain Control  Outcome: Ongoing, Progressing     Problem: Renal Function Impairment (Chronic Kidney Disease)  Goal: Minimize Renal Failure Effects  Outcome: Ongoing, Progressing

## 2023-11-24 NOTE — CONSULTS
"[facility]  Cardiology  Consult Note    Patient Name: Ibrahima Phelps  MRN: [id number]  Admission Date: 11/23/2023  Hospital Length of Stay: 0 days  Code Status: Full Code  Attending Provider: Nadira Shaffer MD  Consulting Provider: Hussain Perez MD  Primary Care Physician: Anatoliy Colindres MD  Principal Problem: Acute on chronic heart failure with preserved ejection fraction    Patient information was obtained from patient, spouse/SO, past medical records, ER records, and primary team.    Inpatient consult to Cardiology  Consult performed by: Hussain Perez MD  Consult ordered by: Meghana Darden PA-C        Subjective:     Chief Complaint   Patient presents with    Shortness of Breath     Cough , kidney xplant 2005      HPI:   68 y.o. male w/ h/o HFpEF (60-65%), CAD s/p PCI (2006), pAF s/p SEC (03/2022), COPD, CKD s/p kidney transplant (2005); p/w CP & SOB.  Reports worsening nonproductive cough & SOB x3 days, worse on exertion. CP is sharp, R chest wall, nonradiating, intermittent, & usually occurs on exertion or when having a coughing fit; relieved w/ SL NG. Denies LE swelling. Denies missed doses of home meds. He was seen by Cards in 10/2023 for unstable angina; at the time he was adamantly against going back on HD, so LHC was deferred 2/2 high risk for DOUGLAS; he was medically managed per ACS protocol w/ DAPT & apixaban, subsequently completed ASA & has been on clopidogrel & apixaban prior to this admission. He was tachypneic & hypertensive on admission. Labs w/ elevated BNP (1718; b/l 184), elevated & uptrending trop (0.064 -> 0.066 -> 0.076 -> 0.070 -> 0.088 -> 0.093). ECG w/ initial NSR w/ LVH -> NSR w/ new ST depressions in II, V4-6 -> NSR w/ less pronounced ST changes. Admitted to [facility] for possible CHF vs COPD exacerbation.    Cardiology consulted for "trop uptrending. EKG with ST depressions".    Past Medical History:   Diagnosis Date    Atrial fibrillation     CAD (coronary artery " "disease) 2006    MI in 2006    CHF (congestive heart failure) 2017    CKD (chronic kidney disease) stage 3, GFR 30-59 ml/min     Dr. Latif.  in transplanted kidney    COVID-19 2/7/2023    Diverticulosis     Hyperlipidemia     Hypertension     Keloid cicatrix     Metabolic bone disease     Other emphysema 10/1/2019    Pericarditis     S/P kidney transplant 1992    x2 (1992 & 2005),    Thrombocytopenia     Thyroid disease     Tobacco use 09/05/2014       Past Surgical History:   Procedure Laterality Date    CARDIOVERSION  04/30/15    CHOLECYSTECTOMY      COLONOSCOPY  April 20, 2011    Diverticulosis, repeat recommended in 3 yrs., repeat colonoscopy 2014 revealed 2 polyps.  He should return in 5 years.    COLONOSCOPY N/A 3/13/2020    Procedure: COLONOSCOPY;  Surgeon: Chon Casper MD;  Location: [facility] (4TH FLR);  Service: Endoscopy;  Laterality: N/A;  ok to hold coumadin x5days-see telephone encounter 2/4/20-tb    COLONOSCOPY N/A 2/4/2021    Procedure: COLONOSCOPY;  Surgeon: Chon Casper MD;  Location: [facility] (4TH FLR);  Service: Endoscopy;  Laterality: N/A;  Eliquis - per Dr. GAVIN Blunt ok to hold x 2 days and "restarted asap"- ERW  last prep "poor", [id number] ordered/ Pt requests Dr. Casper  prep ins. mailed - COVID screening on 2/1/21 PCW- ERW    COLONOSCOPY N/A 3/3/2021    Procedure: COLONOSCOPY;  Surgeon: Chon Casper MD;  Location: [facility] (4TH FLR);  Service: Endoscopy;  Laterality: N/A;  Patient with his second poor bowel pre and has poor prep today.  If patient not intersted or can't get colonoscopy tomorrow will need constipation bowel prep and will need to restart his Eliquis today.Thanks,Chon     per Dr Blunt-ok to hold Eliquis 2 days prior      COVID     CORONARY ANGIOPLASTY WITH STENT PLACEMENT  9/13/2006    IRRIGATION AND DEBRIDEMENT Right 8/30/2023    Procedure: IRRIGATION AND DEBRIDEMENT, RIGHT MIDDLE FINGER;  Surgeon: Martin Larsen MD;  Location: [facility] OR [facility] FLR;  " Service: Orthopedics;  Laterality: Right;    KIDNEY TRANSPLANT  2005    PARATHYROIDECTOMY      TREATMENT OF CARDIAC ARRHYTHMIA N/A 3/28/2022    Procedure: CARDIOVERSION;  Surgeon: Migue Blunt MD;  Location: [facility];  Service: Cardiology;  Laterality: N/A;  AF, DCC, MAC, GP, 3 PREP*RODRIGO deferred pt 100% compliant*       Review of patient's allergies indicates:   Allergen Reactions    Ace inhibitors Swelling    Verapamil Other (See Comments)     Other reaction(s): Unknown    Povidone-iodine Itching       No current facility-administered medications on file prior to encounter.     Current Outpatient Medications on File Prior to Encounter   Medication Sig    acetaminophen (TYLENOL) 500 MG tablet Take 1 tablet (500 mg total) by mouth every 6 (six) hours as needed for Pain.    albuterol (PROVENTIL) 2.5 mg /3 mL (0.083 %) nebulizer solution Take 3 mLs (2.5 mg total) by nebulization every 6 (six) hours as needed for Wheezing. Rescue    albuterol (VENTOLIN HFA) 90 mcg/actuation inhaler Inhale 2 puffs into the lungs every 6 (six) hours as needed for Wheezing or Shortness of Breath. Rescue    amiodarone (PACERONE) 200 MG Tab Take 1 tablet (200 mg total) by mouth once daily.    apixaban (ELIQUIS) 5 mg Tab Take 1 tablet (5 mg total) by mouth 2 (two) times daily.    atorvastatin (LIPITOR) 40 MG tablet Take 1 tablet (40 mg total) by mouth once daily.    b complex vitamins (B COMPLEX-VITAMIN B12) tablet Take 1 tablet by mouth once daily.    calcium acetate,phosphat bind, (PHOSLO) 667 mg capsule TAKE 1 CAPSULE BY MOUTH THREE TIMES A DAY WITH MEALS AND SNACKS    calcium carbonate (CALCIUM 600 ORAL) Take 1 tablet by mouth 2 (two) times a day.    CHOLECALCIFEROL, VITAMIN D3, ORAL Take 2 tablets by mouth 2 (two) times daily.    clopidogreL (PLAVIX) 75 mg tablet Take 1 tablet (75 mg total) by mouth once daily.    doxazosin (CARDURA) 4 MG tablet Take 1 tablet (4 mg total) by mouth every 12 (twelve) hours.    famotidine (PEPCID) 20  MG tablet Take 1 tablet (20 mg total) by mouth 2 (two) times daily.    fexofenadine HCl (ALLEGRA ORAL) Take 1 tablet by mouth daily as needed (allergies).    fluticasone propionate (FLONASE) 50 mcg/actuation nasal spray 1 spray (50 mcg total) by Each Nostril route daily as needed for Allergies.    FLUZONE HIGHDOSE QUAD 23-24  mcg/0.7 mL Syrg     furosemide (LASIX) 20 MG tablet One po QAM, and one po at lunchtime if swelling prn    furosemide (LASIX) 80 MG tablet Take 1 tablet (80 mg total) by mouth every morning.    gabapentin (NEURONTIN) 100 MG capsule Take 1 capsule (100 mg total) by mouth as needed (pain).    hydrALAZINE (APRESOLINE) 50 MG tablet TAKE 1 TABLET (50 MG TOTAL) BY MOUTH 3 (THREE) TIMES DAILY.    isosorbide mononitrate (IMDUR) 30 MG 24 hr tablet Take 2 tablets (60 mg total) by mouth once daily.    metoprolol succinate (TOPROL-XL) 25 MG 24 hr tablet Take 1 tablet (25 mg total) by mouth once daily. (Patient taking differently: Take 12.5 mg by mouth 2 (two) times daily.)    MITIGARE 0.6 mg Cap TAKE 1 CAPSULE BY MOUTH TWICE A DAY AS NEEDED GOUT FLARE UP TO 3 DAYS    multivitamin (THERAGRAN) per tablet Take 1 tablet by mouth Daily.    NIFEdipine (PROCARDIA-XL) 60 MG (OSM) 24 hr tablet Take 1 tablet (60 mg total) by mouth 2 (two) times a day.    nitroGLYCERIN (NITROSTAT) 0.4 MG SL tablet Place 1 tablet (0.4 mg total) under the tongue every 5 (five) minutes as needed for Chest pain.    omeprazole (PRILOSEC) 20 MG capsule Take 1 capsule (20 mg total) by mouth daily as needed (heartburn).    paricalcitoL (ZEMPLAR) 1 MCG capsule TAKE 1 CAPSULE ON MONDAY, WEDNESDAY AND FRIDAY    PFIZER COVID BIVAL,12Y UP,,PF, 30 mcg/0.3 mL injection Inject into the muscle.    potassium chloride (KLOR-CON) 10 MEQ TbSR Take 1 tablet (10 mEq total) by mouth daily as needed (to be taken with lasix).,    predniSONE (DELTASONE) 5 MG tablet TAKE 1 TABLET BY MOUTH EVERY DAY IN THE MORNING    pulse oximeter (PULSE OXIMETER) device  by Apply Externally route 2 (two) times a day. Use twice daily at 8 AM and 3 PM and record the value in [facility] as directed.    tacrolimus (PROGRAF) 1 MG Cap Take 3 capsules (3 mg total) by mouth every 12 (twelve) hours.    fluticasone propion-salmeterol 115-21 mcg/dose (ADVAIR HFA) 115-21 mcg/actuation HFAA inhaler Inhale 2 puffs into the lungs every 12 (twelve) hours. Controller (Patient taking differently: Inhale 2 puffs into the lungs 2 (two) times daily as needed (shortness of breath). 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     Patient is currently retired   Substance and Sexual Activity    Alcohol use: No    Drug use: No    Sexual activity: Yes     Partners: Female     Review of Systems   Constitutional: Negative for chills, fever and night sweats.   Eyes:  Negative for visual disturbance.   Cardiovascular:  Positive for chest pain and dyspnea on exertion. Negative for leg swelling and orthopnea.   Respiratory:  Positive for cough and shortness of breath. Negative for hemoptysis.    Skin:  Negative for color change and rash.   Musculoskeletal:  Negative for joint pain and myalgias.   Gastrointestinal:  Negative for abdominal pain, diarrhea, hematemesis, hematochezia and melena.   Genitourinary:  Negative for dysuria and hematuria.   Neurological:  Negative for headaches and light-headedness.   Psychiatric/Behavioral:  Negative  for altered mental status and memory loss.    All other systems reviewed and are negative.    Objective:     Vital Signs (Most Recent):  Temp: 97.9 °F (36.6 °C) (11/24/23 0442)  Pulse: 69 (11/24/23 0442)  Resp: 20 (11/24/23 0442)  BP: (!) 156/71 (11/24/23 0442)  SpO2: 97 % (11/24/23 0442) Vital Signs (24h Range):  Temp:  [97.9 °F (36.6 °C)-98.3 °F (36.8 °C)] 97.9 °F (36.6 °C)  Pulse:  [62-86] 69  Resp:  [16-22] 20  SpO2:  [94 %-100 %] 97 %  BP: (135-174)/(59-87) 156/71     Weight: 90.9 kg (200 lb 6.4 oz)  Body mass index is 26.44 kg/m².    SpO2: 97 %       No intake or output data in the 24 hours ending 11/24/23 0745    Lines/Drains/Airways       Peripheral Intravenous Line  Duration                  Peripheral IV - Single Lumen 11/23/23 0819 20 G Anterior Antecubital <1 day                     Physical Exam  Vitals and nursing note reviewed.   Constitutional:       General: He is not in acute distress.     Appearance: He is not ill-appearing.   HENT:      Head: Normocephalic and atraumatic.      Right Ear: External ear normal.      Left Ear: External ear normal.      Mouth/Throat:      Mouth: Mucous membranes are moist.      Pharynx: No oropharyngeal exudate.   Eyes:      Extraocular Movements: Extraocular movements intact.      Pupils: Pupils are equal, round, and reactive to light.   Neck:      Vascular: JVD present.   Cardiovascular:      Rate and Rhythm: Normal rate and regular rhythm.      Pulses: Normal pulses.      Heart sounds: Normal heart sounds. No murmur heard.  Pulmonary:      Effort: Pulmonary effort is normal. No respiratory distress.      Breath sounds: Wheezing present. No rales.   Abdominal:      General: Abdomen is flat. Bowel sounds are normal. There is no distension.      Palpations: Abdomen is soft.      Tenderness: There is no abdominal tenderness.   Musculoskeletal:         General: No signs of injury. Normal range of motion.      Cervical back: Normal range of motion.      Right lower leg:  No edema.      Left lower leg: No edema.   Skin:     General: Skin is warm and dry.   Neurological:      General: No focal deficit present.      Mental Status: He is alert and oriented to person, place, and time.      Sensory: No sensory deficit.      Motor: No weakness.   Psychiatric:         Mood and Affect: Mood normal.         Behavior: Behavior normal.         Thought Content: Thought content normal.          Significant Labs: All pertinent lab results from the last 24 hours have been reviewed.    Significant Imaging:  reviewed  Assessment/Plan:      NSTEMI (non-ST elevated myocardial infarction)  68 y.o. male w/ h/o HFpEF (60-65%), CAD s/p PCI (2006), pAF s/p SEC (03/2022), COPD, CKD s/p kidney transplant (2005); p/w CP & SOB. Recently seen by [facility] in 10/2023 for unstable angina; deferred C 2/2 high risk for DOGULAS & pt not wanting to be back on HD, so he was medically managed w/ triple therapy, subsequently kept on clopidogrel & apixaban until this admission. CP is exertional, sharp, & R sided, so atypical; however relieved w/ NG. Hypertensive, no LE edema, lungs CTAB w/ intermittent expiratory wheezing, JVD present. Trop uptrending.     Upon further review, ECG on 11/23 11:48 initially read as AF was interpreted independently by staff Dr Millan to not be AF w/ ST changes more pronounced compared w/ prior presumed baseline ST changes, then less pronounced on subsequent ECGs. 10/15 TTE w/ EF 60-65%, G2 DD, severe LA/RA dilation, mild AS, PA SP 59, CVP 3. 09/13 PET w/ small mild intensity @ apical inferior/inferoseptal reversible perfusion abnormality involving 6% of LV myocardium indication of focal coronary stenosis; severe diffuse coronary calcifications in LAD, moderate diffuse coronary calcifications in LCX & RCA, mild diffuse aortic calcifications in descending aorta; EF 53% @ rest, 56% @ stress.    Suspect NSTEMI 2/2 CHF vs COPD exac. In s/o worsening kidney function & prior PET results, would not strongly  pursue [facility] 2/2 high risk of DOUGLAS.    Recommendations:  -no plans for invasive w/u or interventions at this time  -continue medical management w/ clopidogrel & apixaban  -diurese as tolerated by kidney function  -continue metoprolol at current dose; would not increase while in suspected CHF exac until return to euvolemia  -increase isosorbide mononitrate to 120 mg qd  -increase atorvastatin to 80 mg qd (last LDL 89; goal LDL < 55)  -assess volume status qd  -strict I/Os, daily weights, fluid restriction  -remainder of care per primary team        VTE Risk Mitigation (From admission, onward)           Ordered     apixaban tablet 5 mg  2 times daily         11/23/23 0804                    Thank you for your consult. I will sign off. Please contact us if you have any additional questions.     Hussain Perez MD  Cardiology  [facility]

## 2023-11-25 LAB
ALBUMIN SERPL BCP-MCNC: 2.6 G/DL (ref 3.5–5.2)
ALP SERPL-CCNC: 53 U/L (ref 55–135)
ALT SERPL W/O P-5'-P-CCNC: 17 U/L (ref 10–44)
ANION GAP SERPL CALC-SCNC: 11 MMOL/L (ref 8–16)
AST SERPL-CCNC: 20 U/L (ref 10–40)
BASOPHILS # BLD AUTO: 0.02 K/UL (ref 0–0.2)
BASOPHILS NFR BLD: 0.3 % (ref 0–1.9)
BILIRUB SERPL-MCNC: 0.3 MG/DL (ref 0.1–1)
BUN SERPL-MCNC: 54 MG/DL (ref 8–23)
CALCIUM SERPL-MCNC: 8 MG/DL (ref 8.7–10.5)
CHLORIDE SERPL-SCNC: 109 MMOL/L (ref 95–110)
CO2 SERPL-SCNC: 23 MMOL/L (ref 23–29)
CREAT SERPL-MCNC: 6.5 MG/DL (ref 0.5–1.4)
DIFFERENTIAL METHOD BLD: ABNORMAL
EOSINOPHIL # BLD AUTO: 0.5 K/UL (ref 0–0.5)
EOSINOPHIL NFR BLD: 6.3 % (ref 0–8)
ERYTHROCYTE [DISTWIDTH] IN BLOOD BY AUTOMATED COUNT: 18.1 % (ref 11.5–14.5)
EST. GFR  (NO RACE VARIABLE): 8.6 ML/MIN/1.73 M^2
GLUCOSE SERPL-MCNC: 88 MG/DL (ref 70–110)
HCT VFR BLD AUTO: 23.4 % (ref 40–54)
HGB BLD-MCNC: 7.3 G/DL (ref 14–18)
IMM GRANULOCYTES # BLD AUTO: 0.04 K/UL (ref 0–0.04)
IMM GRANULOCYTES NFR BLD AUTO: 0.5 % (ref 0–0.5)
LYMPHOCYTES # BLD AUTO: 1.4 K/UL (ref 1–4.8)
LYMPHOCYTES NFR BLD: 18.1 % (ref 18–48)
MAGNESIUM SERPL-MCNC: 1.8 MG/DL (ref 1.6–2.6)
MCH RBC QN AUTO: 25 PG (ref 27–31)
MCHC RBC AUTO-ENTMCNC: 31.2 G/DL (ref 32–36)
MCV RBC AUTO: 80 FL (ref 82–98)
MONOCYTES # BLD AUTO: 0.6 K/UL (ref 0.3–1)
MONOCYTES NFR BLD: 7.3 % (ref 4–15)
NEUTROPHILS # BLD AUTO: 5.1 K/UL (ref 1.8–7.7)
NEUTROPHILS NFR BLD: 67.5 % (ref 38–73)
NRBC BLD-RTO: 0 /100 WBC
OSMOLALITY SERPL: 312 MOSM/KG (ref 280–300)
PHOSPHATE SERPL-MCNC: 4.8 MG/DL (ref 2.7–4.5)
PLATELET # BLD AUTO: 139 K/UL (ref 150–450)
PMV BLD AUTO: ABNORMAL FL (ref 9.2–12.9)
POTASSIUM SERPL-SCNC: 4.2 MMOL/L (ref 3.5–5.1)
PROT SERPL-MCNC: 5.7 G/DL (ref 6–8.4)
RBC # BLD AUTO: 2.92 M/UL (ref 4.6–6.2)
SODIUM SERPL-SCNC: 143 MMOL/L (ref 136–145)
TACROLIMUS BLD-MCNC: 3.6 NG/ML (ref 5–15)
WBC # BLD AUTO: 7.52 K/UL (ref 3.9–12.7)

## 2023-11-25 PROCEDURE — 25000003 PHARM REV CODE 250

## 2023-11-25 PROCEDURE — 80197 ASSAY OF TACROLIMUS: CPT | Performed by: INTERNAL MEDICINE

## 2023-11-25 PROCEDURE — 63600175 PHARM REV CODE 636 W HCPCS

## 2023-11-25 PROCEDURE — 94761 N-INVAS EAR/PLS OXIMETRY MLT: CPT

## 2023-11-25 PROCEDURE — 36415 COLL VENOUS BLD VENIPUNCTURE: CPT

## 2023-11-25 PROCEDURE — 99900035 HC TECH TIME PER 15 MIN (STAT)

## 2023-11-25 PROCEDURE — 84100 ASSAY OF PHOSPHORUS: CPT

## 2023-11-25 PROCEDURE — 25000003 PHARM REV CODE 250: Performed by: INTERNAL MEDICINE

## 2023-11-25 PROCEDURE — 83735 ASSAY OF MAGNESIUM: CPT

## 2023-11-25 PROCEDURE — 83930 ASSAY OF BLOOD OSMOLALITY: CPT

## 2023-11-25 PROCEDURE — 80053 COMPREHEN METABOLIC PANEL: CPT

## 2023-11-25 PROCEDURE — 94640 AIRWAY INHALATION TREATMENT: CPT | Mod: XB

## 2023-11-25 PROCEDURE — 85025 COMPLETE CBC W/AUTO DIFF WBC: CPT

## 2023-11-25 PROCEDURE — 21400001 HC TELEMETRY ROOM

## 2023-11-25 PROCEDURE — 25000242 PHARM REV CODE 250 ALT 637 W/ HCPCS

## 2023-11-25 PROCEDURE — 96375 TX/PRO/DX INJ NEW DRUG ADDON: CPT

## 2023-11-25 PROCEDURE — 63600175 PHARM REV CODE 636 W HCPCS: Mod: JZ,JG | Performed by: INTERNAL MEDICINE

## 2023-11-25 PROCEDURE — 99233 SBSQ HOSP IP/OBS HIGH 50: CPT | Mod: ,,, | Performed by: INTERNAL MEDICINE

## 2023-11-25 RX ORDER — CETIRIZINE HYDROCHLORIDE 10 MG/1
10 TABLET ORAL NIGHTLY
Status: DISCONTINUED | OUTPATIENT
Start: 2023-11-25 | End: 2023-11-26 | Stop reason: HOSPADM

## 2023-11-25 RX ORDER — OXYMETAZOLINE HCL 0.05 %
2 SPRAY, NON-AEROSOL (ML) NASAL 2 TIMES DAILY
Status: DISCONTINUED | OUTPATIENT
Start: 2023-11-25 | End: 2023-11-26 | Stop reason: HOSPADM

## 2023-11-25 RX ORDER — CEFAZOLIN SODIUM 2 G/50ML
2 SOLUTION INTRAVENOUS
Status: CANCELLED | OUTPATIENT
Start: 2023-11-25

## 2023-11-25 RX ORDER — FLUTICASONE PROPIONATE 50 MCG
2 SPRAY, SUSPENSION (ML) NASAL DAILY
Status: DISCONTINUED | OUTPATIENT
Start: 2023-11-25 | End: 2023-11-26 | Stop reason: HOSPADM

## 2023-11-25 RX ORDER — SODIUM CHLORIDE 9 MG/ML
INJECTION, SOLUTION INTRAVENOUS CONTINUOUS
Status: CANCELLED | OUTPATIENT
Start: 2023-11-25

## 2023-11-25 RX ADMIN — FLUTICASONE FUROATE AND VILANTEROL TRIFENATATE 1 PUFF: 100; 25 POWDER RESPIRATORY (INHALATION) at 07:11

## 2023-11-25 RX ADMIN — NIFEDIPINE 60 MG: 60 TABLET, FILM COATED, EXTENDED RELEASE ORAL at 10:11

## 2023-11-25 RX ADMIN — GUAIFENESIN 600 MG: 600 TABLET, EXTENDED RELEASE ORAL at 09:11

## 2023-11-25 RX ADMIN — FLUTICASONE PROPIONATE 100 MCG: 50 SPRAY, METERED NASAL at 08:11

## 2023-11-25 RX ADMIN — CLOPIDOGREL BISULFATE 75 MG: 75 TABLET ORAL at 09:11

## 2023-11-25 RX ADMIN — FERUMOXYTOL 510 MG: 510 INJECTION INTRAVENOUS at 12:11

## 2023-11-25 RX ADMIN — TACROLIMUS 3 MG: 1 CAPSULE ORAL at 08:11

## 2023-11-25 RX ADMIN — IPRATROPIUM BROMIDE AND ALBUTEROL SULFATE 3 ML: .5; 3 SOLUTION RESPIRATORY (INHALATION) at 03:11

## 2023-11-25 RX ADMIN — AMIODARONE HYDROCHLORIDE 200 MG: 200 TABLET ORAL at 09:11

## 2023-11-25 RX ADMIN — ATORVASTATIN CALCIUM 80 MG: 40 TABLET, FILM COATED ORAL at 09:11

## 2023-11-25 RX ADMIN — HYDRALAZINE HYDROCHLORIDE 50 MG: 50 TABLET ORAL at 08:11

## 2023-11-25 RX ADMIN — ISOSORBIDE MONONITRATE 120 MG: 60 TABLET, EXTENDED RELEASE ORAL at 09:11

## 2023-11-25 RX ADMIN — DOXAZOSIN 4 MG: 4 TABLET ORAL at 10:11

## 2023-11-25 RX ADMIN — DOXAZOSIN 4 MG: 4 TABLET ORAL at 08:11

## 2023-11-25 RX ADMIN — TACROLIMUS 3 MG: 1 CAPSULE ORAL at 06:11

## 2023-11-25 RX ADMIN — HYDRALAZINE HYDROCHLORIDE 50 MG: 50 TABLET ORAL at 02:11

## 2023-11-25 RX ADMIN — PREDNISONE 5 MG: 5 TABLET ORAL at 09:11

## 2023-11-25 RX ADMIN — CETIRIZINE HYDROCHLORIDE 10 MG: 10 TABLET, FILM COATED ORAL at 08:11

## 2023-11-25 RX ADMIN — HYDRALAZINE HYDROCHLORIDE 50 MG: 50 TABLET ORAL at 09:11

## 2023-11-25 RX ADMIN — METOPROLOL SUCCINATE 12.5 MG: 25 TABLET, EXTENDED RELEASE ORAL at 09:11

## 2023-11-25 RX ADMIN — APIXABAN 5 MG: 5 TABLET, FILM COATED ORAL at 08:11

## 2023-11-25 RX ADMIN — APIXABAN 5 MG: 5 TABLET, FILM COATED ORAL at 09:11

## 2023-11-25 RX ADMIN — IPRATROPIUM BROMIDE AND ALBUTEROL SULFATE 3 ML: .5; 3 SOLUTION RESPIRATORY (INHALATION) at 12:11

## 2023-11-25 RX ADMIN — IPRATROPIUM BROMIDE AND ALBUTEROL SULFATE 3 ML: .5; 3 SOLUTION RESPIRATORY (INHALATION) at 07:11

## 2023-11-25 RX ADMIN — GUAIFENESIN 600 MG: 600 TABLET, EXTENDED RELEASE ORAL at 08:11

## 2023-11-25 RX ADMIN — METOPROLOL SUCCINATE 12.5 MG: 25 TABLET, EXTENDED RELEASE ORAL at 08:11

## 2023-11-25 RX ADMIN — OXYMETAZOLINE HYDROCHLORIDE 2 SPRAY: 0.05 SPRAY NASAL at 03:11

## 2023-11-25 RX ADMIN — NIFEDIPINE 60 MG: 60 TABLET, FILM COATED, EXTENDED RELEASE ORAL at 08:11

## 2023-11-25 NOTE — ASSESSMENT & PLAN NOTE
- intermittent chest pain   - continue ASA and imdur  - EKG in NSR with LVH   - troponin 0.064 >> 0.076, continue to trend   - troponins elevated and continuing to rise  - cardiology consulted   -no plans for invasive w/u or interventions at this time  -continue medical management w/ clopidogrel & apixaban  -diurese as tolerated by kidney function  -continue metoprolol at current dose; would not increase while in suspected CHF exac until return to euvolemia  -increase isosorbide mononitrate to 120 mg qd (ordered)  -increase atorvastatin to 80 mg qd (ordered)   -assess volume status qd  -strict I/Os, daily weights, fluid restriction  -remainder of care per primary team

## 2023-11-25 NOTE — ASSESSMENT & PLAN NOTE
- history of pulm HTN   - Results for orders placed during the hospital encounter of 10/13/23  Echo    Pulmonary Artery: The estimated pulmonary artery systolic pressure is 59 mmHg.    IVC/SVC: Normal venous pressure at 3 mmHg.

## 2023-11-25 NOTE — PROGRESS NOTES
"[facility] - Observation [address] [place] [address] Medicine  Progress Note    Patient Name: Ibrahima Phelps  MRN: [id number]  Patient Class: OP- Observation   Admission Date: 11/23/2023  Length of Stay: 0 days  Attending Physician: Nadira Shaffer MD  Primary Care Provider: Anatoliy Colindres MD        Subjective:     Principal Problem:CKD (chronic kidney disease) stage 5, GFR less than 15 ml/min        HPI:  Ibrahima Phelps is a 68 y.o. male with a PMH of HFpEF, COPD, a-fib, CKD s/p renal transplant, CAD who presented to the ED with complaints of SOB and CP. States that he has had a worsening cough and SOB x 3 days. The cough is nonproductive. The CP and SOB are worse when he get "agitated". He has not missed any doses of lasix recently. He is unsure if the SOB is due to his COPD or CHF. Denies fevers, chills, runny nose, nausea, vomiting, diarrhea or recent illness exposure.     ED: Hypertensive and tachypneic on arrival. CBC without leukocytosis but with chronic stable anemia. Cr 5.6 (baseline). BNP elevated to 1718. Troponin 0.064 >> 0.076. EKG in NSR with LVH. CXR with improved but persistent increased parenchymal opacification at the bases. Given 40 of IV lasix, 3 dounebs and 60mg of prednisone with improvement. Patient was admitted to hospital medicine for further care.     Overview/Hospital Course:  Ibrahima Phelps is a 68 y.o. male who was admitted to hospital medicine for CHF vs COPD exacerbation. BNP 1,718. Started on 80 IV lasix daily with cumulative output of ~1.5L. Additionally started on dounebs q4H. Troponin elevated to 0.064 >> 0.076 >> 0.088 >> 0.093. Cardiology consulted, recommending continuing medical management, increased statin and imdur. 6 minute walk test ordered and wnl. KTM consulted for KATINA on CKD V in transplanted kidney. Urine lytes pending & bladder scans q6 initiated to r/o urinary retention. Patient will be discharged when medically ready with cardiology & KTM follow up.     Interval History: Pt seen and " examined by me this morning. DEVORA NICHOLASJACINTO. Pt reports persistent and unchanged SOB and cough productive of clear sputum. Cr from 5.9 -> 6.5 this am. He denies difficulty urinating or dysuria. Bladder scans ordered q6h to r/o urinary retention. KTM consulted and following, appreciate recs.     Review of Systems   Constitutional:  Negative for activity change, chills and fever.   HENT:  Negative for trouble swallowing.    Eyes:  Negative for photophobia and visual disturbance.   Respiratory:  Positive for cough and shortness of breath. Negative for chest tightness and wheezing.    Cardiovascular:  Positive for chest pain. Negative for palpitations and leg swelling.   Gastrointestinal:  Negative for abdominal pain, constipation, diarrhea, nausea and vomiting.   Genitourinary:  Negative for dysuria, frequency, hematuria and urgency.   Musculoskeletal:  Negative for arthralgias, back pain and gait problem.   Skin:  Negative for color change and rash.   Neurological:  Negative for dizziness, syncope, weakness, light-headedness, numbness and headaches.   Psychiatric/Behavioral:  Negative for agitation and confusion. The patient is not nervous/anxious.      Objective:     Vital Signs (Most Recent):  Temp: 96.2 °F (35.7 °C) (11/25/23 1126)  Pulse: (!) 59 (11/25/23 1237)  Resp: 18 (11/25/23 1237)  BP: (!) 118/58 (11/25/23 1126)  SpO2: 96 % (11/25/23 1237) Vital Signs (24h Range):  Temp:  [96.1 °F (35.6 °C)-98.2 °F (36.8 °C)] 96.2 °F (35.7 °C)  Pulse:  [55-64] 59  Resp:  [16-18] 18  SpO2:  [92 %-97 %] 96 %  BP: (118-134)/(58-66) 118/58     Weight: 90.1 kg (198 lb 10.2 oz)  Body mass index is 26.21 kg/m².    Intake/Output Summary (Last 24 hours) at 11/25/2023 1319  Last data filed at 11/25/2023 1035  Gross per 24 hour   Intake --   Output 1350 ml   Net -1350 ml         Physical Exam  Vitals and nursing note reviewed.   Constitutional:       General: He is not in acute distress.     Appearance: He is well-developed.   HENT:       Head: Normocephalic and atraumatic.      Mouth/Throat:      Pharynx: No oropharyngeal exudate.   Neck:      Vascular: No JVD.   Cardiovascular:      Rate and Rhythm: Normal rate.      Heart sounds: Normal heart sounds.   Pulmonary:      Effort: Pulmonary effort is normal. No respiratory distress.      Breath sounds: Wheezing, rhonchi and rales (faint) present.   Abdominal:      General: Bowel sounds are normal. There is no distension.      Palpations: Abdomen is soft.      Tenderness: There is no abdominal tenderness.   Musculoskeletal:         General: No tenderness. Normal range of motion.      Cervical back: Normal range of motion and neck supple.      Right lower leg: No edema.      Left lower leg: No edema.   Lymphadenopathy:      Cervical: No cervical adenopathy.   Skin:     General: Skin is warm and dry.      Capillary Refill: Capillary refill takes less than 2 seconds.      Findings: No rash.   Neurological:      Mental Status: He is alert and oriented to person, place, and time.      Cranial Nerves: No cranial nerve deficit.      Sensory: No sensory deficit.      Coordination: Coordination normal.   Psychiatric:         Behavior: Behavior normal.         Thought Content: Thought content normal.         Judgment: Judgment normal.             Significant Labs: All pertinent labs within the past 24 hours have been reviewed.    Significant Imaging: I have reviewed all pertinent imaging results/findings within the past 24 hours.    Assessment/Plan:      * CKD (chronic kidney disease) stage 5, GFR less than 15 ml/min  Creatine stable but worse s/p IV diuresis. BMP reviewed- noted Estimated Creatinine Clearance: 12.1 mL/min (A) (based on SCr of 6.5 mg/dL (H)). according to latest data. Based on current GFR, CKD stage is stage 5 - GFR < 15.  Monitor UOP and serial BMP and adjust therapy as needed. Renally dose meds. Avoid nephrotoxic medications and procedures.  - Urine lytes pending   - Bladder scan q6h to r/o  retention     Chronic obstructive pulmonary disease with acute exacerbation  Patient's COPD is with exacerbation noted by continued dyspnea currently.  Patient is currently off COPD Pathway. Continue scheduled inhalers Supplemental oxygen and monitor respiratory status closely.   - unsure if SOB is due to COPD exacerbation vs CHF   - dounebs q4h awake   - continue home prednisone daily   - guanfacine for cough    Acute on chronic heart failure with preserved ejection fraction  Patient is identified as having Diastolic (HFpEF) heart failure that is Acute on chronic. CHF is currently uncontrolled due to JVD and Dyspnea not returned to baseline after 2 doses of IV diuretic. Latest ECHO performed and demonstrates- Results for orders placed during the hospital encounter of 10/13/23  Echo  Interpretation Summary    Left Ventricle: The left ventricle is normal in size. Ventricular mass is normal. Normal wall thickness. Normal wall motion. There is normal systolic function with a visually estimated ejection fraction of 60 - 65%. Grade II diastolic dysfunction.    Left Atrium: Left atrium is severely dilated.    Right Ventricle: Normal right ventricular cavity size. Wall thickness is normal. Right ventricle wall motion  is normal. Systolic function is normal.    Right Atrium: Right atrium is dilated.    Aortic Valve: Moderately calcified cusps. There is moderate annular calcification present. Mildly restricted motion. There is mild stenosis. Aortic valve area by VTI is 1.85 cm². Aortic valve peak velocity is 2.12 m/s. Mean gradient is 9 mmHg. The dimensionless index is 0.49. There is mild to moderate aortic regurgitation.    Pulmonary Artery: The estimated pulmonary artery systolic pressure is 59 mmHg.    IVC/SVC: Normal venous pressure at 3 mmHg.  - Continue Beta Blocker, ACE/ARB, and Furosemide and monitor clinical status closely. Monitor on telemetry. Patient is on CHF pathway.  Monitor strict Is&Os and daily weights.   Place on fluid restriction of 1.5 L. Cardiology has not been consulted. Continue to stress to patient importance of self efficacy and  on diet for CHF. Last BNP reviewed- and noted below   Recent Labs   Lab 11/23/23  0819   BNP 1,718*     - HOLD lasix today given worsening renal function and euvolemic on exam     Chest pain  - intermittent chest pain   - continue ASA and imdur  - EKG in NSR with LVH   - troponin 0.064 >> 0.076, continue to trend   - troponins elevated and continuing to rise  - cardiology consulted   -no plans for invasive w/u or interventions at this time  -continue medical management w/ clopidogrel & apixaban  -diurese as tolerated by kidney function  -continue metoprolol at current dose; would not increase while in suspected CHF exac until return to euvolemia  -increase isosorbide mononitrate to 120 mg qd (ordered)  -increase atorvastatin to 80 mg qd (ordered)   -assess volume status qd  -strict I/Os, daily weights, fluid restriction  -remainder of care per primary team    Pulmonary HTN  - history of pulm HTN   - Results for orders placed during the hospital encounter of 10/13/23  Echo    Pulmonary Artery: The estimated pulmonary artery systolic pressure is 59 mmHg.    IVC/SVC: Normal venous pressure at 3 mmHg.    Paroxysmal atrial fibrillation  Patient with Paroxysmal (<7 days) atrial fibrillation which is controlled currently with Beta Blocker and Amiodarone. Patient is currently in sinus rhythm.[id number] Score: 2. Anticoagulation indicated. Anticoagulation done with eliquis .    H/O kidney transplant  - history of renal transplant   - KTM consulted   - continue tacro and prednisone    Mixed hyperlipidemia  - continue statin    CAD (coronary artery disease)  Patient with known CAD s/p stent placement, which is controlled Will continue ASA, Plavix, and Statin and monitor for S/Sx of angina/ACS. Continue to monitor on telemetry.     Primary hypertension  Chronic, controlled. Latest blood  pressure and vitals reviewed-     Temp:  [98 °F (36.7 °C)-98.1 °F (36.7 °C)]   Pulse:  [63-86]   Resp:  [16-24]   BP: (148-174)/(68-87)   SpO2:  [93 %-100 %] .   Home meds for hypertension were reviewed and noted below.   Hypertension Medications               doxazosin (CARDURA) 4 MG tablet Take 1 tablet (4 mg total) by mouth every 12 (twelve) hours.    furosemide (LASIX) 20 MG tablet One po QAM, and one po at lunchtime if swelling prn    furosemide (LASIX) 80 MG tablet Take 1 tablet (80 mg total) by mouth every morning.    hydrALAZINE (APRESOLINE) 50 MG tablet TAKE 1 TABLET (50 MG TOTAL) BY MOUTH 3 (THREE) TIMES DAILY.    isosorbide mononitrate (IMDUR) 30 MG 24 hr tablet Take 2 tablets (60 mg total) by mouth once daily.    metoprolol succinate (TOPROL-XL) 25 MG 24 hr tablet Take 1 tablet (25 mg total) by mouth once daily.    NIFEdipine (PROCARDIA-XL) 60 MG (OSM) 24 hr tablet Take 1 tablet (60 mg total) by mouth 2 (two) times a day.    nitroGLYCERIN (NITROSTAT) 0.4 MG SL tablet Place 1 tablet (0.4 mg total) under the tongue every 5 (five) minutes as needed for Chest pain.            While in the hospital, will manage blood pressure as follows; Continue home antihypertensive regimen    Will utilize p.r.n. blood pressure medication only if patient's blood pressure greater than 180/110 and he develops symptoms such as worsening chest pain or shortness of breath.      VTE Risk Mitigation (From admission, onward)           Ordered     apixaban tablet 5 mg  2 times daily         11/23/23 0804                    Discharge Planning   RAMU: 11/25/2023     Code Status: Full Code   Is the patient medically ready for discharge?: No    Reason for patient still in hospital (select all that apply): Patient trending condition, Laboratory test, and Consult recommendations  Discharge Plan A: Home                  Trish Robles PA-C  Department of Hospital Medicine   [facility] - Observation 11H

## 2023-11-25 NOTE — ASSESSMENT & PLAN NOTE
Creatine stable but worse s/p IV diuresis. BMP reviewed- noted Estimated Creatinine Clearance: 12.1 mL/min (A) (based on SCr of 6.5 mg/dL (H)). according to latest data. Based on current GFR, CKD stage is stage 5 - GFR < 15.  Monitor UOP and serial BMP and adjust therapy as needed. Renally dose meds. Avoid nephrotoxic medications and procedures.  - Urine lytes pending   - Bladder scan q6h to r/o retention

## 2023-11-25 NOTE — SUBJECTIVE & OBJECTIVE
Interval History: Pt seen and examined by me this morning. DEVORA LOCKE. Pt reports persistent and unchanged SOB and cough productive of clear sputum. Cr from 5.9 -> 6.5 this am. He denies difficulty urinating or dysuria. Bladder scans ordered q6h to r/o urinary retention. KTM consulted and following, appreciate recs.     Review of Systems   Constitutional:  Negative for activity change, chills and fever.   HENT:  Negative for trouble swallowing.    Eyes:  Negative for photophobia and visual disturbance.   Respiratory:  Positive for cough and shortness of breath. Negative for chest tightness and wheezing.    Cardiovascular:  Positive for chest pain. Negative for palpitations and leg swelling.   Gastrointestinal:  Negative for abdominal pain, constipation, diarrhea, nausea and vomiting.   Genitourinary:  Negative for dysuria, frequency, hematuria and urgency.   Musculoskeletal:  Negative for arthralgias, back pain and gait problem.   Skin:  Negative for color change and rash.   Neurological:  Negative for dizziness, syncope, weakness, light-headedness, numbness and headaches.   Psychiatric/Behavioral:  Negative for agitation and confusion. The patient is not nervous/anxious.      Objective:     Vital Signs (Most Recent):  Temp: 96.2 °F (35.7 °C) (11/25/23 1126)  Pulse: (!) 59 (11/25/23 1237)  Resp: 18 (11/25/23 1237)  BP: (!) 118/58 (11/25/23 1126)  SpO2: 96 % (11/25/23 1237) Vital Signs (24h Range):  Temp:  [96.1 °F (35.6 °C)-98.2 °F (36.8 °C)] 96.2 °F (35.7 °C)  Pulse:  [55-64] 59  Resp:  [16-18] 18  SpO2:  [92 %-97 %] 96 %  BP: (118-134)/(58-66) 118/58     Weight: 90.1 kg (198 lb 10.2 oz)  Body mass index is 26.21 kg/m².    Intake/Output Summary (Last 24 hours) at 11/25/2023 1319  Last data filed at 11/25/2023 1035  Gross per 24 hour   Intake --   Output 1350 ml   Net -1350 ml         Physical Exam  Vitals and nursing note reviewed.   Constitutional:       General: He is not in acute distress.     Appearance: He  is well-developed.   HENT:      Head: Normocephalic and atraumatic.      Mouth/Throat:      Pharynx: No oropharyngeal exudate.   Neck:      Vascular: No JVD.   Cardiovascular:      Rate and Rhythm: Normal rate.      Heart sounds: Normal heart sounds.   Pulmonary:      Effort: Pulmonary effort is normal. No respiratory distress.      Breath sounds: Wheezing, rhonchi and rales (faint) present.   Abdominal:      General: Bowel sounds are normal. There is no distension.      Palpations: Abdomen is soft.      Tenderness: There is no abdominal tenderness.   Musculoskeletal:         General: No tenderness. Normal range of motion.      Cervical back: Normal range of motion and neck supple.      Right lower leg: No edema.      Left lower leg: No edema.   Lymphadenopathy:      Cervical: No cervical adenopathy.   Skin:     General: Skin is warm and dry.      Capillary Refill: Capillary refill takes less than 2 seconds.      Findings: No rash.   Neurological:      Mental Status: He is alert and oriented to person, place, and time.      Cranial Nerves: No cranial nerve deficit.      Sensory: No sensory deficit.      Coordination: Coordination normal.   Psychiatric:         Behavior: Behavior normal.         Thought Content: Thought content normal.         Judgment: Judgment normal.             Significant Labs: All pertinent labs within the past 24 hours have been reviewed.    Significant Imaging: I have reviewed all pertinent imaging results/findings within the past 24 hours.

## 2023-11-25 NOTE — PROGRESS NOTES
"KIDNEY TRANSPLANT MEDICINE PROGRESS NOTE    Please refer to detailed progress note from 11/24/2023 by Dr Conroy for complete details of this admission.    Interval history: still with some SOB. He is unsure of the plan during the hospital stay    Past medical, surgical, family, social history reviewed & unchanged since admission.     I/O last 3 completed shifts:  In: -   Out: 1350 [Urine:1350]    VITALS:  height is 6' 1" (1.854 m) and weight is 90.1 kg (198 lb 10.2 oz). His oral temperature is 96.1 °F (35.6 °C). His blood pressure is 132/58 (abnormal) and his pulse is 62. His respiration is 16 and oxygen saturation is 97%.       A&O x3, NAD.  Lungs BL rhonchi and whezzzes, unlabored.  Heart regular, no rub.  Abdomen soft, nontender, no masses.  Allograft nontender.  Edema: none.    Recent Labs   Lab 11/23/23 0819 11/24/23 0419 11/25/23  0502   WBC 8.33 7.11 7.52   HGB 8.0* 7.6* 7.3*   HCT 26.2* 23.6* 23.4*   * 140* 139*       Recent Labs   Lab 11/23/23 0819 11/24/23 0419 11/25/23  0502    142 143   K 4.1 4.5 4.2    108 109   CO2 22* 22* 23   BUN 41* 50* 54*   CREATININE 5.6* 5.9* 6.5*   CALCIUM 9.3 8.7 8.0*   PHOS  --  5.2* 4.8*     Recent Labs   Lab 11/23/23 0819 11/24/23 0419 11/25/23  0502   Tacrolimus Lvl 2.8 L 3.7 L 3.6 L       ASSESSMENT/PLAN:    Problems/plans as noted in the referenced progress note. Additional pertinent findings:  1) Unstable angina  3) s/p kidney transplant  4) Immunosuppression  5) CKD stage V  6) CAD s/p CABG  7) COPD  8) AFIB on Eliquis  9) HTN  10) Anemia    I explained in detail that his kidney functioning is slowly worsening and there is chance he need dialysis sooner rather than later according with his fluid balance and respiratory status.  I also explained that the hospital stay is very reasonable due to his mix COPD exacerbation and fluid overload  Continue with b mimetics and diuretics as needed.  No need for immediate initiation of dialysis  Continue with " daily labs  Strict intake / output  Low salt diet   Cardiology HM management of angina and A Fib  Continue anticoagulation  Continue with daily tacro level  Will proceed with Iron IV today due to severe anemia and low iron stores  HM can consider a blood transfusion due to ischemia and SOB. Will defer to them  I will initiate ISAURA within the next 24 to 48 hrs    I spoke with patient and wife at least for 25 minutes  All th e\kidney questions and kidney function questions were answered      CKD post kidney transplant  -Follow with strict I/Os, daily weights, BP (goal <140/90), daily labs.    -Avoid nephrotoxic agents when feasible (NSAIDs, IV contrast dye, Aminoglycoside-containing antibiotics)  -Renally dose all appropriate medications, including antibiotics      Encounter for Monitoring Immunosuppression post Transplant  -Continue to trend immunosuppression levels.   -Monitor for med-related side effects or drug toxicity given immunosuppressant med with narrow therapeutic window.

## 2023-11-25 NOTE — ASSESSMENT & PLAN NOTE
Patient is identified as having Diastolic (HFpEF) heart failure that is Acute on chronic. CHF is currently uncontrolled due to JVD and Dyspnea not returned to baseline after 2 doses of IV diuretic. Latest ECHO performed and demonstrates- Results for orders placed during the hospital encounter of 10/13/23  Echo  Interpretation Summary    Left Ventricle: The left ventricle is normal in size. Ventricular mass is normal. Normal wall thickness. Normal wall motion. There is normal systolic function with a visually estimated ejection fraction of 60 - 65%. Grade II diastolic dysfunction.    Left Atrium: Left atrium is severely dilated.    Right Ventricle: Normal right ventricular cavity size. Wall thickness is normal. Right ventricle wall motion  is normal. Systolic function is normal.    Right Atrium: Right atrium is dilated.    Aortic Valve: Moderately calcified cusps. There is moderate annular calcification present. Mildly restricted motion. There is mild stenosis. Aortic valve area by VTI is 1.85 cm². Aortic valve peak velocity is 2.12 m/s. Mean gradient is 9 mmHg. The dimensionless index is 0.49. There is mild to moderate aortic regurgitation.    Pulmonary Artery: The estimated pulmonary artery systolic pressure is 59 mmHg.    IVC/SVC: Normal venous pressure at 3 mmHg.  - Continue Beta Blocker, ACE/ARB, and Furosemide and monitor clinical status closely. Monitor on telemetry. Patient is on CHF pathway.  Monitor strict Is&Os and daily weights.  Place on fluid restriction of 1.5 L. Cardiology has not been consulted. Continue to stress to patient importance of self efficacy and  on diet for CHF. Last BNP reviewed- and noted below   Recent Labs   Lab 11/23/23  0819   BNP 1,718*     - HOLD lasix today given worsening renal function and euvolemic on exam

## 2023-11-25 NOTE — ASSESSMENT & PLAN NOTE
Patient with Paroxysmal (<7 days) atrial fibrillation which is controlled currently with Beta Blocker and Amiodarone. Patient is currently in sinus rhythm.[id number] Score: 2. Anticoagulation indicated. Anticoagulation done with eliquis .

## 2023-11-25 NOTE — PLAN OF CARE
Problem: Adult Inpatient Plan of Care  Goal: Plan of Care Review  Outcome: Ongoing, Progressing     Problem: Infection  Goal: Absence of Infection Signs and Symptoms  Outcome: Ongoing, Progressing     Problem: Fall Injury Risk  Goal: Absence of Fall and Fall-Related Injury  Outcome: Ongoing, Progressing     Problem: COPD (Chronic Obstructive Pulmonary Disease) Comorbidity  Goal: Maintenance of COPD Symptom Control  Outcome: Ongoing, Progressing     Problem: Heart Failure Comorbidity  Goal: Maintenance of Heart Failure Symptom Control  Outcome: Ongoing, Progressing     Problem: Hypertension Comorbidity  Goal: Blood Pressure in Desired Range  Outcome: Ongoing, Progressing     Problem: Chest Pain  Goal: Resolution of Chest Pain Symptoms  Outcome: Ongoing, Progressing

## 2023-11-26 VITALS
BODY MASS INDEX: 27.87 KG/M2 | RESPIRATION RATE: 18 BRPM | OXYGEN SATURATION: 96 % | SYSTOLIC BLOOD PRESSURE: 154 MMHG | TEMPERATURE: 98 F | DIASTOLIC BLOOD PRESSURE: 67 MMHG | WEIGHT: 210.31 LBS | HEIGHT: 73 IN | HEART RATE: 65 BPM

## 2023-11-26 PROBLEM — J42 CHRONIC BRONCHITIS: Status: ACTIVE | Noted: 2023-11-26

## 2023-11-26 LAB
ALBUMIN SERPL BCP-MCNC: 2.8 G/DL (ref 3.5–5.2)
ALP SERPL-CCNC: 60 U/L (ref 55–135)
ALT SERPL W/O P-5'-P-CCNC: 19 U/L (ref 10–44)
ANION GAP SERPL CALC-SCNC: 11 MMOL/L (ref 8–16)
AST SERPL-CCNC: 22 U/L (ref 10–40)
BASOPHILS # BLD AUTO: 0.01 K/UL (ref 0–0.2)
BASOPHILS NFR BLD: 0.1 % (ref 0–1.9)
BILIRUB SERPL-MCNC: 0.4 MG/DL (ref 0.1–1)
BUN SERPL-MCNC: 55 MG/DL (ref 8–23)
CALCIUM SERPL-MCNC: 8.3 MG/DL (ref 8.7–10.5)
CHLORIDE SERPL-SCNC: 111 MMOL/L (ref 95–110)
CO2 SERPL-SCNC: 22 MMOL/L (ref 23–29)
CREAT SERPL-MCNC: 6.3 MG/DL (ref 0.5–1.4)
DIFFERENTIAL METHOD BLD: ABNORMAL
EOSINOPHIL # BLD AUTO: 0.5 K/UL (ref 0–0.5)
EOSINOPHIL NFR BLD: 6.1 % (ref 0–8)
ERYTHROCYTE [DISTWIDTH] IN BLOOD BY AUTOMATED COUNT: 17.6 % (ref 11.5–14.5)
EST. GFR  (NO RACE VARIABLE): 8.9 ML/MIN/1.73 M^2
GLUCOSE SERPL-MCNC: 92 MG/DL (ref 70–110)
HCT VFR BLD AUTO: 24.3 % (ref 40–54)
HGB BLD-MCNC: 7.6 G/DL (ref 14–18)
IMM GRANULOCYTES # BLD AUTO: 0.04 K/UL (ref 0–0.04)
IMM GRANULOCYTES NFR BLD AUTO: 0.5 % (ref 0–0.5)
LYMPHOCYTES # BLD AUTO: 1.7 K/UL (ref 1–4.8)
LYMPHOCYTES NFR BLD: 22.3 % (ref 18–48)
MAGNESIUM SERPL-MCNC: 2 MG/DL (ref 1.6–2.6)
MCH RBC QN AUTO: 24.7 PG (ref 27–31)
MCHC RBC AUTO-ENTMCNC: 31.3 G/DL (ref 32–36)
MCV RBC AUTO: 79 FL (ref 82–98)
MONOCYTES # BLD AUTO: 0.7 K/UL (ref 0.3–1)
MONOCYTES NFR BLD: 8.4 % (ref 4–15)
NEUTROPHILS # BLD AUTO: 4.9 K/UL (ref 1.8–7.7)
NEUTROPHILS NFR BLD: 62.6 % (ref 38–73)
NRBC BLD-RTO: 0 /100 WBC
PHOSPHATE SERPL-MCNC: 4.7 MG/DL (ref 2.7–4.5)
PLATELET # BLD AUTO: 173 K/UL (ref 150–450)
PMV BLD AUTO: 12.9 FL (ref 9.2–12.9)
POTASSIUM SERPL-SCNC: 3.9 MMOL/L (ref 3.5–5.1)
PROCALCITONIN SERPL IA-MCNC: 0.31 NG/ML
PROT SERPL-MCNC: 6.1 G/DL (ref 6–8.4)
RBC # BLD AUTO: 3.08 M/UL (ref 4.6–6.2)
SODIUM SERPL-SCNC: 144 MMOL/L (ref 136–145)
TACROLIMUS BLD-MCNC: 4.5 NG/ML (ref 5–15)
WBC # BLD AUTO: 7.75 K/UL (ref 3.9–12.7)

## 2023-11-26 PROCEDURE — 25000003 PHARM REV CODE 250

## 2023-11-26 PROCEDURE — 94640 AIRWAY INHALATION TREATMENT: CPT

## 2023-11-26 PROCEDURE — 83735 ASSAY OF MAGNESIUM: CPT

## 2023-11-26 PROCEDURE — 25000242 PHARM REV CODE 250 ALT 637 W/ HCPCS

## 2023-11-26 PROCEDURE — 84145 PROCALCITONIN (PCT): CPT

## 2023-11-26 PROCEDURE — 94761 N-INVAS EAR/PLS OXIMETRY MLT: CPT

## 2023-11-26 PROCEDURE — 80197 ASSAY OF TACROLIMUS: CPT | Performed by: INTERNAL MEDICINE

## 2023-11-26 PROCEDURE — 63600175 PHARM REV CODE 636 W HCPCS

## 2023-11-26 PROCEDURE — 63700000 PHARM REV CODE 250 ALT 637 W/O HCPCS

## 2023-11-26 PROCEDURE — 84100 ASSAY OF PHOSPHORUS: CPT

## 2023-11-26 PROCEDURE — 36415 COLL VENOUS BLD VENIPUNCTURE: CPT

## 2023-11-26 PROCEDURE — 80053 COMPREHEN METABOLIC PANEL: CPT

## 2023-11-26 PROCEDURE — 85025 COMPLETE CBC W/AUTO DIFF WBC: CPT

## 2023-11-26 PROCEDURE — 99233 SBSQ HOSP IP/OBS HIGH 50: CPT | Mod: ,,, | Performed by: INTERNAL MEDICINE

## 2023-11-26 RX ORDER — METOPROLOL SUCCINATE 25 MG/1
12.5 TABLET, EXTENDED RELEASE ORAL 2 TIMES DAILY
Qty: 90 TABLET | Refills: 3 | Status: ON HOLD
Start: 2023-11-26 | End: 2024-02-21 | Stop reason: HOSPADM

## 2023-11-26 RX ORDER — AZITHROMYCIN 250 MG/1
250 TABLET, FILM COATED ORAL DAILY
Qty: 4 TABLET | Refills: 0 | Status: SHIPPED | OUTPATIENT
Start: 2023-11-27 | End: 2023-12-01

## 2023-11-26 RX ORDER — FLUTICASONE PROPIONATE AND SALMETEROL XINAFOATE 115; 21 UG/1; UG/1
2 AEROSOL, METERED RESPIRATORY (INHALATION) 2 TIMES DAILY
Qty: 12 G | Refills: 2 | Status: SHIPPED | OUTPATIENT
Start: 2023-11-26 | End: 2023-11-26 | Stop reason: HOSPADM

## 2023-11-26 RX ORDER — AZITHROMYCIN 250 MG/1
500 TABLET, FILM COATED ORAL ONCE
Status: COMPLETED | OUTPATIENT
Start: 2023-11-26 | End: 2023-11-26

## 2023-11-26 RX ORDER — ISOSORBIDE MONONITRATE 30 MG/1
120 TABLET, EXTENDED RELEASE ORAL DAILY
Qty: 120 TABLET | Refills: 11 | Status: SHIPPED | OUTPATIENT
Start: 2023-11-26 | End: 2023-11-28 | Stop reason: SDUPTHER

## 2023-11-26 RX ORDER — AZITHROMYCIN 250 MG/1
250 TABLET, FILM COATED ORAL DAILY
Qty: 4 TABLET | Refills: 0 | Status: SHIPPED | OUTPATIENT
Start: 2023-11-27 | End: 2023-11-26 | Stop reason: SDUPTHER

## 2023-11-26 RX ORDER — FUROSEMIDE 80 MG/1
80 TABLET ORAL DAILY
Status: DISCONTINUED | OUTPATIENT
Start: 2023-11-26 | End: 2023-11-26 | Stop reason: HOSPADM

## 2023-11-26 RX ORDER — IPRATROPIUM BROMIDE AND ALBUTEROL SULFATE 2.5; .5 MG/3ML; MG/3ML
3 SOLUTION RESPIRATORY (INHALATION) EVERY 6 HOURS PRN
Qty: 75 ML | Refills: 0 | Status: ON HOLD | OUTPATIENT
Start: 2023-11-26 | End: 2024-11-25

## 2023-11-26 RX ORDER — ATORVASTATIN CALCIUM 40 MG/1
80 TABLET, FILM COATED ORAL DAILY
Qty: 180 TABLET | Refills: 3 | Status: ON HOLD | OUTPATIENT
Start: 2023-11-26 | End: 2024-11-25

## 2023-11-26 RX ORDER — FLUTICASONE FUROATE AND VILANTEROL 100; 25 UG/1; UG/1
1 POWDER RESPIRATORY (INHALATION) DAILY
Status: ON HOLD
Start: 2023-11-27

## 2023-11-26 RX ORDER — PREDNISONE 20 MG/1
40 TABLET ORAL DAILY
Qty: 10 TABLET | Refills: 0 | Status: SHIPPED | OUTPATIENT
Start: 2023-11-26 | End: 2023-11-28 | Stop reason: SDUPTHER

## 2023-11-26 RX ADMIN — FUROSEMIDE 80 MG: 80 TABLET ORAL at 08:11

## 2023-11-26 RX ADMIN — FLUTICASONE FUROATE AND VILANTEROL TRIFENATATE 1 PUFF: 100; 25 POWDER RESPIRATORY (INHALATION) at 10:11

## 2023-11-26 RX ADMIN — AMIODARONE HYDROCHLORIDE 200 MG: 200 TABLET ORAL at 08:11

## 2023-11-26 RX ADMIN — NIFEDIPINE 60 MG: 60 TABLET, FILM COATED, EXTENDED RELEASE ORAL at 08:11

## 2023-11-26 RX ADMIN — PREDNISONE 5 MG: 5 TABLET ORAL at 08:11

## 2023-11-26 RX ADMIN — IPRATROPIUM BROMIDE AND ALBUTEROL SULFATE 3 ML: .5; 3 SOLUTION RESPIRATORY (INHALATION) at 10:11

## 2023-11-26 RX ADMIN — CLOPIDOGREL BISULFATE 75 MG: 75 TABLET ORAL at 08:11

## 2023-11-26 RX ADMIN — DOXAZOSIN 4 MG: 4 TABLET ORAL at 08:11

## 2023-11-26 RX ADMIN — GUAIFENESIN 600 MG: 600 TABLET, EXTENDED RELEASE ORAL at 08:11

## 2023-11-26 RX ADMIN — APIXABAN 5 MG: 5 TABLET, FILM COATED ORAL at 08:11

## 2023-11-26 RX ADMIN — AZITHROMYCIN 500 MG: 250 TABLET, FILM COATED ORAL at 12:11

## 2023-11-26 RX ADMIN — TACROLIMUS 3 MG: 1 CAPSULE ORAL at 08:11

## 2023-11-26 RX ADMIN — HYDRALAZINE HYDROCHLORIDE 50 MG: 50 TABLET ORAL at 08:11

## 2023-11-26 RX ADMIN — METOPROLOL SUCCINATE 12.5 MG: 25 TABLET, EXTENDED RELEASE ORAL at 08:11

## 2023-11-26 RX ADMIN — ISOSORBIDE MONONITRATE 120 MG: 60 TABLET, EXTENDED RELEASE ORAL at 08:11

## 2023-11-26 RX ADMIN — FLUTICASONE PROPIONATE 100 MCG: 50 SPRAY, METERED NASAL at 08:11

## 2023-11-26 RX ADMIN — ATORVASTATIN CALCIUM 80 MG: 40 TABLET, FILM COATED ORAL at 08:11

## 2023-11-26 RX ADMIN — OXYMETAZOLINE HYDROCHLORIDE 2 SPRAY: 0.05 SPRAY NASAL at 08:11

## 2023-11-26 NOTE — PLAN OF CARE
Problem: Adult Inpatient Plan of Care  Goal: Plan of Care Review  Outcome: Met  Goal: Patient-Specific Goal (Individualized)  Outcome: Met  Goal: Absence of Hospital-Acquired Illness or Injury  Outcome: Met  Goal: Optimal Comfort and Wellbeing  Outcome: Met  Goal: Readiness for Transition of Care  Outcome: Met     Problem: Infection  Goal: Absence of Infection Signs and Symptoms  Outcome: Met     Problem: Fluid and Electrolyte Imbalance (Acute Kidney Injury/Impairment)  Goal: Fluid and Electrolyte Balance  Outcome: Met     Problem: Oral Intake Inadequate (Acute Kidney Injury/Impairment)  Goal: Optimal Nutrition Intake  Outcome: Met     Problem: Renal Function Impairment (Acute Kidney Injury/Impairment)  Goal: Effective Renal Function  Outcome: Met     Problem: Fall Injury Risk  Goal: Absence of Fall and Fall-Related Injury  Outcome: Met     Problem: COPD (Chronic Obstructive Pulmonary Disease) Comorbidity  Goal: Maintenance of COPD Symptom Control  Outcome: Met     Problem: Heart Failure Comorbidity  Goal: Maintenance of Heart Failure Symptom Control  Outcome: Met     Problem: Hypertension Comorbidity  Goal: Blood Pressure in Desired Range  Outcome: Met     Problem: Chest Pain  Goal: Resolution of Chest Pain Symptoms  Outcome: Met     Problem: Adjustment to Illness (Chronic Kidney Disease)  Goal: Optimal Coping with Chronic Illness  Outcome: Met     Problem: Electrolyte Imbalance (Chronic Kidney Disease)  Goal: Electrolyte Balance  Outcome: Met     Problem: Fluid Volume Excess (Chronic Kidney Disease)  Goal: Fluid Balance  Outcome: Met     Problem: Functional Decline (Chronic Kidney Disease)  Goal: Optimal Functional Ability  Outcome: Met     Problem: Hematologic Alteration (Chronic Kidney Disease)  Goal: Absence of Anemia Signs and Symptoms  Outcome: Met     Problem: Oral Intake Inadequate (Chronic Kidney Disease)  Goal: Optimal Oral Intake  Outcome: Met     Problem: Pain (Chronic Kidney Disease)  Goal:  Acceptable Pain Control  Outcome: Met     Problem: Renal Function Impairment (Chronic Kidney Disease)  Goal: Minimize Renal Failure Effects  Outcome: Met     Problem: Hypertension Acute  Goal: Blood Pressure Within Desired Range  Outcome: Met

## 2023-11-26 NOTE — NURSING
"[facility]  Six Minute Walk     SUMMARY                  Height: 6' 1" (185.4 cm)  Weight: 95.4 kg (210 lb 5.1 oz)  BMI (Calculated): 27.8                Phase Oxygen Assessment Supplemental O2 Heart   Rate Blood Pressure Clay Dyspnea Scale Rating   Resting 96%              Exercise        Minute        1           2           3           4           5           6  96%              Recovery        Minute        1 97%            2           3           4               Six Minute Walk Summary           Interpretation:                                                     Predicted Distance Meters (Calculated): 588.43 meters                        "

## 2023-11-26 NOTE — ASSESSMENT & PLAN NOTE
- history of pulm HTN   - Results for orders placed during the hospital encounter of 10/13/23  Echo    Pulmonary Artery: The estimated pulmonary artery systolic pressure is 59 mmHg.    IVC/SVC: Normal venous pressure at 3 mmHg.

## 2023-11-26 NOTE — ASSESSMENT & PLAN NOTE
Chronic, controlled. Latest blood pressure and vitals reviewed-     Temp:  [96.2 °F (35.7 °C)-98.6 °F (37 °C)]   Pulse:  [56-79]   Resp:  [16-20]   BP: (124-156)/(57-76)   SpO2:  [93 %-97 %] .   Home meds for hypertension were reviewed and noted below.   Hypertension Medications               doxazosin (CARDURA) 4 MG tablet Take 1 tablet (4 mg total) by mouth every 12 (twelve) hours.    furosemide (LASIX) 20 MG tablet One po QAM, and one po at lunchtime if swelling prn    furosemide (LASIX) 80 MG tablet Take 1 tablet (80 mg total) by mouth every morning.    hydrALAZINE (APRESOLINE) 50 MG tablet TAKE 1 TABLET (50 MG TOTAL) BY MOUTH 3 (THREE) TIMES DAILY.    isosorbide mononitrate (IMDUR) 30 MG 24 hr tablet Take 2 tablets (60 mg total) by mouth once daily.    metoprolol succinate (TOPROL-XL) 25 MG 24 hr tablet Take 1 tablet (25 mg total) by mouth once daily.    NIFEdipine (PROCARDIA-XL) 60 MG (OSM) 24 hr tablet Take 1 tablet (60 mg total) by mouth 2 (two) times a day.    nitroGLYCERIN (NITROSTAT) 0.4 MG SL tablet Place 1 tablet (0.4 mg total) under the tongue every 5 (five) minutes as needed for Chest pain.            While in the hospital, will manage blood pressure as follows; Continue home antihypertensive regimen    Will utilize p.r.n. blood pressure medication only if patient's blood pressure greater than 180/110 and he develops symptoms such as worsening chest pain or shortness of breath.

## 2023-11-26 NOTE — PROGRESS NOTES
"KIDNEY TRANSPLANT MEDICINE PROGRESS NOTE    Please refer to detailed progress note from 11/25/2026 for complete details of this admission.    Interval history: unchnaged     Past medical, surgical, family, social history reviewed & unchanged since admission.     I/O last 3 completed shifts:  In: 250 [P.O.:240; I.V.:10]  Out: 1414 [Urine:1414]    VITALS:  height is 6' 1" (1.854 m) and weight is 95.4 kg (210 lb 5.1 oz). His temperature is 98 °F (36.7 °C). His blood pressure is 154/67 (abnormal) and his pulse is 63. His respiration is 18 and oxygen saturation is 94% (abnormal).       A&O x3, NAD.  Lungs CTA, unlabored.  Heart regular, no rub.  Abdomen soft, nontender, no masses.  Allograft nontender.  Edema: none.    Recent Labs   Lab 11/24/23 0419 11/25/23  0502 11/26/23  0443   WBC 7.11 7.52 7.75   HGB 7.6* 7.3* 7.6*   HCT 23.6* 23.4* 24.3*   * 139* 173       Recent Labs   Lab 11/24/23 0419 11/25/23  0502 11/26/23  0443    143 144   K 4.5 4.2 3.9    109 111*   CO2 22* 23 22*   BUN 50* 54* 55*   CREATININE 5.9* 6.5* 6.3*   CALCIUM 8.7 8.0* 8.3*   PHOS 5.2* 4.8* 4.7*     Recent Labs   Lab 11/24/23 0419 11/25/23  0502 11/26/23  0443   Tacrolimus Lvl 3.7 L 3.6 L 4.5 L       ASSESSMENT/PLAN:    Problems/plans as noted in the referenced progress note. Additional pertinent findings:  Ckd stage 5  Angina  Cad  MDB  Anemia  Immunosuppression  High risk immunosuppressive medication  High complexity case    I spoke with family and HM team for 20 minutes  No change with prograf dose  Monitor labs daily  I advised patient and wife to see his nephrologist within the next 1 week  Appointment with vascular service of dialysis access placement      CKD post kidney transplant  -Follow with strict I/Os, daily weights, BP (goal <140/90), daily labs.    -Avoid nephrotoxic agents when feasible (NSAIDs, IV contrast dye, Aminoglycoside-containing antibiotics)  -Renally dose all appropriate medications, including " antibiotics      Encounter for Monitoring Immunosuppression post Transplant  -Continue to trend immunosuppression levels.   -Monitor for med-related side effects or drug toxicity given immunosuppressant med with narrow therapeutic window.

## 2023-11-26 NOTE — PLAN OF CARE
Problem: Adult Inpatient Plan of Care  Goal: Plan of Care Review  Outcome: Ongoing, Progressing  Goal: Patient-Specific Goal (Individualized)  Outcome: Ongoing, Progressing  Goal: Absence of Hospital-Acquired Illness or Injury  Outcome: Ongoing, Progressing  Goal: Optimal Comfort and Wellbeing  Outcome: Ongoing, Progressing  Goal: Readiness for Transition of Care  Outcome: Ongoing, Progressing     Problem: Infection  Goal: Absence of Infection Signs and Symptoms  Outcome: Ongoing, Progressing     Problem: Fluid and Electrolyte Imbalance (Acute Kidney Injury/Impairment)  Goal: Fluid and Electrolyte Balance  Outcome: Ongoing, Progressing     Problem: Oral Intake Inadequate (Acute Kidney Injury/Impairment)  Goal: Optimal Nutrition Intake  Outcome: Ongoing, Progressing     Problem: Renal Function Impairment (Acute Kidney Injury/Impairment)  Goal: Effective Renal Function  Outcome: Ongoing, Progressing     Problem: Fall Injury Risk  Goal: Absence of Fall and Fall-Related Injury  Outcome: Ongoing, Progressing     Problem: COPD (Chronic Obstructive Pulmonary Disease) Comorbidity  Goal: Maintenance of COPD Symptom Control  Outcome: Ongoing, Progressing     Problem: Heart Failure Comorbidity  Goal: Maintenance of Heart Failure Symptom Control  Outcome: Ongoing, Progressing     Problem: Hypertension Comorbidity  Goal: Blood Pressure in Desired Range  Outcome: Ongoing, Progressing     Problem: Chest Pain  Goal: Resolution of Chest Pain Symptoms  Outcome: Ongoing, Progressing     Problem: Adjustment to Illness (Chronic Kidney Disease)  Goal: Optimal Coping with Chronic Illness  Outcome: Ongoing, Progressing     Problem: Electrolyte Imbalance (Chronic Kidney Disease)  Goal: Electrolyte Balance  Outcome: Ongoing, Progressing     Problem: Fluid Volume Excess (Chronic Kidney Disease)  Goal: Fluid Balance  Outcome: Ongoing, Progressing     Problem: Functional Decline (Chronic Kidney Disease)  Goal: Optimal Functional  Ability  Outcome: Ongoing, Progressing     Problem: Hematologic Alteration (Chronic Kidney Disease)  Goal: Absence of Anemia Signs and Symptoms  Outcome: Ongoing, Progressing     Problem: Oral Intake Inadequate (Chronic Kidney Disease)  Goal: Optimal Oral Intake  Outcome: Ongoing, Progressing     Problem: Pain (Chronic Kidney Disease)  Goal: Acceptable Pain Control  Outcome: Ongoing, Progressing     Problem: Renal Function Impairment (Chronic Kidney Disease)  Goal: Minimize Renal Failure Effects  Outcome: Ongoing, Progressing     Problem: Hypertension Acute  Goal: Blood Pressure Within Desired Range  Outcome: Ongoing, Progressing

## 2023-11-26 NOTE — ASSESSMENT & PLAN NOTE
Patient is identified as having Diastolic (HFpEF) heart failure that is Acute on chronic. CHF is currently uncontrolled due to JVD and Dyspnea not returned to baseline after 2 doses of IV diuretic. Latest ECHO performed and demonstrates- Results for orders placed during the hospital encounter of 10/13/23  Echo  Interpretation Summary    Left Ventricle: The left ventricle is normal in size. Ventricular mass is normal. Normal wall thickness. Normal wall motion. There is normal systolic function with a visually estimated ejection fraction of 60 - 65%. Grade II diastolic dysfunction.    Left Atrium: Left atrium is severely dilated.    Right Ventricle: Normal right ventricular cavity size. Wall thickness is normal. Right ventricle wall motion  is normal. Systolic function is normal.    Right Atrium: Right atrium is dilated.    Aortic Valve: Moderately calcified cusps. There is moderate annular calcification present. Mildly restricted motion. There is mild stenosis. Aortic valve area by VTI is 1.85 cm². Aortic valve peak velocity is 2.12 m/s. Mean gradient is 9 mmHg. The dimensionless index is 0.49. There is mild to moderate aortic regurgitation.    Pulmonary Artery: The estimated pulmonary artery systolic pressure is 59 mmHg.    IVC/SVC: Normal venous pressure at 3 mmHg.  - Continue Beta Blocker, ACE/ARB, and Furosemide and monitor clinical status closely. Monitor on telemetry. Patient is on CHF pathway.  Monitor strict Is&Os and daily weights.  Place on fluid restriction of 1.5 L. Cardiology has not been consulted. Continue to stress to patient importance of self efficacy and  on diet for CHF. Last BNP reviewed- and noted below   Recent Labs   Lab 11/23/23  0819   BNP 1,718*     - HOLD lasix today given worsening renal function and euvolemic on exam

## 2023-11-26 NOTE — ASSESSMENT & PLAN NOTE
Creatine stable but worse s/p IV diuresis. BMP reviewed- noted Estimated Creatinine Clearance: 12.5 mL/min (A) (based on SCr of 6.3 mg/dL (H)). according to latest data. Based on current GFR, CKD stage is stage 5 - GFR < 15.  Monitor UOP and serial BMP and adjust therapy as needed. Renally dose meds. Avoid nephrotoxic medications and procedures.  - patient urinating appropriately, no retention on bladder scan as of 11/26

## 2023-11-26 NOTE — PLAN OF CARE
Problem: Adult Inpatient Plan of Care  Goal: Plan of Care Review  Outcome: Ongoing, Progressing     Problem: Renal Function Impairment (Acute Kidney Injury/Impairment)  Goal: Effective Renal Function  Outcome: Ongoing, Progressing

## 2023-11-26 NOTE — PLAN OF CARE
Patient will transport home at discharge with family/friends. No additional needs at this time.   11/26/23 1337   Final Note   Assessment Type Final Discharge Note   Anticipated Discharge Disposition Home   Hospital Resources/Appts/Education Provided Provided patient/caregiver with written discharge plan information   Post-Acute Status   Discharge Delays None known at this time     [facility]  Discharge Final Note    Primary Care Provider: Anatoliy Colindres MD    Expected Discharge Date: 11/26/2023    Final Discharge Note (most recent)       Final Note - 11/26/23 1337          Final Note    Assessment Type Final Discharge Note (P)      Anticipated Discharge Disposition Home or Self Care (P)      Hospital Resources/Appts/Education Provided Provided patient/caregiver with written discharge plan information (P)         Post-Acute Status    Discharge Delays None known at this time (P)                      Important Message from Medicare

## 2023-11-26 NOTE — DISCHARGE SUMMARY
"[facility] - Observation [address] [place] [address] Medicine  Discharge Summary      Patient Name: Ibrahima Phelps  MRN: [id number]  SHAVONNE: [id number]  Patient Class: IP- Inpatient  Admission Date: 11/23/2023  Hospital Length of Stay: 1 days  Discharge Date and Time:  11/26/2023 1:11 PM  Attending Physician: Nadira Shaffer MD   Discharging Provider: Catherine Sarmiento PA-C  Primary Care Provider: Anatoliy Colindres MD  Hospital Medicine Team: [facility] MED  Catherine Sarmiento PA-C  Primary Care Team: [facility]    HPI:   Ibrahima Phelps is a 68 y.o. male with a PMH of HFpEF, COPD, a-fib, CKD s/p renal transplant, CAD who presented to the ED with complaints of SOB and CP. States that he has had a worsening cough and SOB x 3 days. The cough is nonproductive. The CP and SOB are worse when he get "agitated". He has not missed any doses of lasix recently. He is unsure if the SOB is due to his COPD or CHF. Denies fevers, chills, runny nose, nausea, vomiting, diarrhea or recent illness exposure.     ED: Hypertensive and tachypneic on arrival. CBC without leukocytosis but with chronic stable anemia. Cr 5.6 (baseline). BNP elevated to 1718. Troponin 0.064 >> 0.076. EKG in NSR with LVH. CXR with improved but persistent increased parenchymal opacification at the bases. Given 40 of IV lasix, 3 dounebs and 60mg of prednisone with improvement. Patient was admitted to hospital medicine for further care.     * No surgery found *      Hospital Course:   Ibrahima Phelps is a 68 y.o. male who was admitted to hospital medicine for CHF vs COPD exacerbation. BNP 1,718. Started on 80 IV lasix daily with cumulative output of ~1.5L. Resumed home oral lasix dose. Additionally started on dounebs q4H. Troponin elevated to 0.064 >> 0.076 >> 0.088 >> 0.093. Cardiology consulted, recommending continuing medical management, increased statin and imdur. Treating for COPD exacerbation with duo-nebs, prednisone, azithromycin. 6 minute walk test passed without need for " supplemental O2. KTM consulted for KATINA on CKD V in transplanted kidney. Urinary retention/ oliguria early on it stay, but since resolved. Cr stabilized, but patient will likely need to initiate dialysis in the near future. IV iron provided for iron-deficiency anemia with associated chronic renal failure. KTM recommends second dose of IV feraheme outpatient and close follow up with nephrologist. Will need EPO initiation. Vascular surgery has pt scheduled for AVG placement in the next few weeks. Patient will be discharged with cardiology & KTM follow up.      Goals of Care Treatment Preferences:  Code Status: Full Code    Health care agent: Pt's wife is CRISTY  Health care agent number: No value filed.          What is most important right now is to focus on spending time at home, remaining as independent as possible, symptom/pain control, quality of life, even if it means sacrificing a little time.  Accordingly, we have decided that the best plan to meet the patient's goals includes continuing with treatment.      Consults:   Consults (From admission, onward)          Status Ordering Provider     Inpatient consult to Cardiology  Once        Provider:  (Not yet assigned)    Completed ARIN BARKER     Inpatient consult to Kidney/Pancreas Transplant Medicine  Once        Provider:  (Not yet assigned)    Completed JOELLEN LANDIS     Inpatient consult to Social Work/Case Management  Once        Provider:  (Not yet assigned)    Acknowledged JOELLEN LANDIS     Inpatient consult to Registered Dietitian/Nutritionist  Once        Provider:  (Not yet assigned)    Completed JOELLEN LANDIS            Pulmonary  Chronic bronchitis        Chronic obstructive pulmonary disease with acute exacerbation  Patient's COPD is with exacerbation noted by continued dyspnea currently.  Patient is currently off COPD Pathway. Continue scheduled inhalers Supplemental oxygen and monitor respiratory status closely.   - SOB likely due to COPD  exacerbation vs CHF   - dounebs q4h awake   - continue home prednisone daily, but add 40 mg daily x 5 days in setting of acute exacerbation   - procal positive. Treat with azithromycin x 5 days.   - reinforced need for controller inhalers     Cardiac/Vascular  NSTEMI (non-ST elevated myocardial infarction)        Chest pain  - intermittent chest pain   - continue ASA and imdur  - EKG in NSR with LVH   - troponin 0.064 >> 0.076, continue to trend   - troponins elevated and continuing to rise  - cardiology consulted   -no plans for invasive w/u or interventions at this time  -continue medical management w/ clopidogrel & apixaban  -diurese as tolerated by kidney function  -continue metoprolol at current dose; would not increase while in suspected CHF exac until return to euvolemia  -increase isosorbide mononitrate to 120 mg qd (ordered)  -increase atorvastatin to 80 mg qd (ordered)   -assess volume status qd  -strict I/Os, daily weights, fluid restriction  -remainder of care per primary team    Acute on chronic heart failure with preserved ejection fraction  Patient is identified as having Diastolic (HFpEF) heart failure that is Acute on chronic. CHF is currently uncontrolled due to JVD and Dyspnea not returned to baseline after 2 doses of IV diuretic. Latest ECHO performed and demonstrates- Results for orders placed during the hospital encounter of 10/13/23  Echo  Interpretation Summary    Left Ventricle: The left ventricle is normal in size. Ventricular mass is normal. Normal wall thickness. Normal wall motion. There is normal systolic function with a visually estimated ejection fraction of 60 - 65%. Grade II diastolic dysfunction.    Left Atrium: Left atrium is severely dilated.    Right Ventricle: Normal right ventricular cavity size. Wall thickness is normal. Right ventricle wall motion  is normal. Systolic function is normal.    Right Atrium: Right atrium is dilated.    Aortic Valve: Moderately calcified cusps.  There is moderate annular calcification present. Mildly restricted motion. There is mild stenosis. Aortic valve area by VTI is 1.85 cm². Aortic valve peak velocity is 2.12 m/s. Mean gradient is 9 mmHg. The dimensionless index is 0.49. There is mild to moderate aortic regurgitation.    Pulmonary Artery: The estimated pulmonary artery systolic pressure is 59 mmHg.    IVC/SVC: Normal venous pressure at 3 mmHg.  - Continue Beta Blocker, ACE/ARB, and Furosemide and monitor clinical status closely. Monitor on telemetry. Patient is on CHF pathway.  Monitor strict Is&Os and daily weights.  Place on fluid restriction of 1.5 L. Cardiology has not been consulted. Continue to stress to patient importance of self efficacy and  on diet for CHF. Last BNP reviewed- and noted below   Recent Labs   Lab 11/23/23  0819   BNP 1,718*     - HOLD lasix today given worsening renal function and euvolemic on exam     Pulmonary HTN  - history of pulm HTN   - Results for orders placed during the hospital encounter of 10/13/23  Echo    Pulmonary Artery: The estimated pulmonary artery systolic pressure is 59 mmHg.    IVC/SVC: Normal venous pressure at 3 mmHg.    Paroxysmal atrial fibrillation  Patient with Paroxysmal (<7 days) atrial fibrillation which is controlled currently with Beta Blocker and Amiodarone. Patient is currently in sinus rhythm.[id number] Score: 2. Anticoagulation indicated. Anticoagulation done with eliquis .    Mixed hyperlipidemia  - continue statin    CAD (coronary artery disease)  Patient with known CAD s/p stent placement, which is controlled Will continue ASA, Plavix, and Statin and monitor for S/Sx of angina/ACS. Continue to monitor on telemetry.     Primary hypertension  Chronic, controlled. Latest blood pressure and vitals reviewed-     Temp:  [96.2 °F (35.7 °C)-98.6 °F (37 °C)]   Pulse:  [56-79]   Resp:  [16-20]   BP: (124-156)/(57-76)   SpO2:  [93 %-97 %] .   Home meds for hypertension were reviewed and noted  below.   Hypertension Medications               doxazosin (CARDURA) 4 MG tablet Take 1 tablet (4 mg total) by mouth every 12 (twelve) hours.    furosemide (LASIX) 20 MG tablet One po QAM, and one po at lunchtime if swelling prn    furosemide (LASIX) 80 MG tablet Take 1 tablet (80 mg total) by mouth every morning.    hydrALAZINE (APRESOLINE) 50 MG tablet TAKE 1 TABLET (50 MG TOTAL) BY MOUTH 3 (THREE) TIMES DAILY.    isosorbide mononitrate (IMDUR) 30 MG 24 hr tablet Take 2 tablets (60 mg total) by mouth once daily.    metoprolol succinate (TOPROL-XL) 25 MG 24 hr tablet Take 1 tablet (25 mg total) by mouth once daily.    NIFEdipine (PROCARDIA-XL) 60 MG (OSM) 24 hr tablet Take 1 tablet (60 mg total) by mouth 2 (two) times a day.    nitroGLYCERIN (NITROSTAT) 0.4 MG SL tablet Place 1 tablet (0.4 mg total) under the tongue every 5 (five) minutes as needed for Chest pain.            While in the hospital, will manage blood pressure as follows; Continue home antihypertensive regimen    Will utilize p.r.n. blood pressure medication only if patient's blood pressure greater than 180/110 and he develops symptoms such as worsening chest pain or shortness of breath.    Renal/  * CKD (chronic kidney disease) stage 5, GFR less than 15 ml/min  Creatine stable but worse s/p IV diuresis. BMP reviewed- noted Estimated Creatinine Clearance: 12.5 mL/min (A) (based on SCr of 6.3 mg/dL (H)). according to latest data. Based on current GFR, CKD stage is stage 5 - GFR < 15.  Monitor UOP and serial BMP and adjust therapy as needed. Renally dose meds. Avoid nephrotoxic medications and procedures.  - patient urinating appropriately, no retention on bladder scan as of 11/26    Immunosuppressive management encounter following kidney transplant        H/O kidney transplant  - history of renal transplant   - KTM consulted   - continue tacro and prednisone - tacro level monitored during stay. no dose adjustments recommended      Final Active  Diagnoses:    Diagnosis Date Noted POA    PRINCIPAL PROBLEM:  CKD (chronic kidney disease) stage 5, GFR less than 15 ml/min [N18.5] 11/06/2023 Yes    Chronic bronchitis [J42] 11/26/2023 Unknown    NSTEMI (non-ST elevated myocardial infarction) [I21.4] 11/24/2023 Yes    Immunosuppressive management encounter following kidney transplant [Z79.899, Z94.0] 11/23/2023 Not Applicable    Chest pain [R07.9] 10/15/2023 Yes    Acute on chronic heart failure with preserved ejection fraction [I50.33] 03/17/2023 Yes    Pulmonary HTN [I27.20] 02/10/2023 Yes     Chronic    Chronic obstructive pulmonary disease with acute exacerbation [J44.1] 10/01/2019 Yes    Paroxysmal atrial fibrillation [I48.0] 09/25/2017 Yes     Chronic    H/O kidney transplant [Z94.0] 07/10/2015 Not Applicable     Chronic    Primary hypertension [I10]  Yes    CAD (coronary artery disease) [I25.10]  Yes     Chronic    Mixed hyperlipidemia [E78.2]  Yes     Chronic      Problems Resolved During this Admission:    Diagnosis Date Noted Date Resolved POA    Acute renal failure superimposed on stage 4 chronic kidney disease [N17.9, N18.4] 03/17/2023 11/23/2023 Yes       Discharged Condition: stable    Disposition:     Follow Up:    Patient Instructions:      Ambulatory referral/consult to Chemotherapy Infusion   Standing Status: Future   Referral Priority: Urgent Referral Type: Consultation   Referral Reason: Specialty Services Required   Requested Specialty: Nephrology   Number of Visits Requested: 1       Significant Diagnostic Studies: Labs: BMP:   Recent Labs   Lab 11/25/23  0502 11/26/23  0443   GLU 88 92    144   K 4.2 3.9    111*   CO2 23 22*   BUN 54* 55*   CREATININE 6.5* 6.3*   CALCIUM 8.0* 8.3*   MG 1.8 2.0       Pending Diagnostic Studies:       None           Medications:  Reconciled Home Medications:      Medication List        START taking these medications      azithromycin 250 MG tablet  Commonly known as: Z-ELIAS  Take 1 tablet (250 mg  total) by mouth once daily. for 4 days  Start taking on: November 27, 2023     dextrose 5 % (D5W) SolP 100 mL with ferumoxytoL 510 mg/17 mL (30 mg/mL) Soln 510 mg  Inject 510 mg into the vein once. for 1 dose            CHANGE how you take these medications      atorvastatin 40 MG tablet  Commonly known as: LIPITOR  Take 2 tablets (80 mg total) by mouth once daily.  What changed: how much to take     fluticasone propion-salmeterol 115-21 mcg/dose 115-21 mcg/actuation Hfaa inhaler  Commonly known as: ADVAIR HFA  Inhale 2 puffs into the lungs 2 (two) times a day. Controller. Use this inhaler every day.  What changed:   when to take this  additional instructions     isosorbide mononitrate 30 MG 24 hr tablet  Commonly known as: IMDUR  Take 4 tablets (120 mg total) by mouth once daily.  What changed: how much to take     * predniSONE 5 MG tablet  Commonly known as: DELTASONE  TAKE 1 TABLET BY MOUTH EVERY DAY IN THE MORNING  What changed: Another medication with the same name was added. Make sure you understand how and when to take each.     * predniSONE 20 MG tablet  Commonly known as: DELTASONE  Take 2 tablets (40 mg total) by mouth once daily. for 5 days  What changed: You were already taking a medication with the same name, and this prescription was added. Make sure you understand how and when to take each.           * This list has 2 medication(s) that are the same as other medications prescribed for you. Read the directions carefully, and ask your doctor or other care provider to review them with you.                CONTINUE taking these medications      acetaminophen 500 MG tablet  Commonly known as: TYLENOL  Take 1 tablet (500 mg total) by mouth every 6 (six) hours as needed for Pain.     * albuterol 90 mcg/actuation inhaler  Commonly known as: VENTOLIN HFA  Inhale 2 puffs into the lungs every 6 (six) hours as needed for Wheezing or Shortness of Breath. Rescue     * albuterol 2.5 mg /3 mL (0.083 %) nebulizer  solution  Commonly known as: PROVENTIL  Take 3 mLs (2.5 mg total) by nebulization every 6 (six) hours as needed for Wheezing. Rescue     ALLEGRA ORAL  Take 1 tablet by mouth daily as needed (allergies).     amiodarone 200 MG Tab  Commonly known as: PACERONE  Take 1 tablet (200 mg total) by mouth once daily.     apixaban 5 mg Tab  Commonly known as: ELIQUIS  Take 1 tablet (5 mg total) by mouth 2 (two) times daily.     b complex vitamins tablet  Commonly known as: B COMPLEX-VITAMIN B12  Take 1 tablet by mouth once daily.     CALCIUM 600 ORAL  Take 1 tablet by mouth 2 (two) times a day.     calcium acetate(phosphat bind) 667 mg capsule  Commonly known as: PHOSLO  TAKE 1 CAPSULE BY MOUTH THREE TIMES A DAY WITH MEALS AND SNACKS     CHOLECALCIFEROL (VITAMIN D3) ORAL  Take 2 tablets by mouth 2 (two) times daily.     clopidogreL 75 mg tablet  Commonly known as: PLAVIX  Take 1 tablet (75 mg total) by mouth once daily.     doxazosin 4 MG tablet  Commonly known as: CARDURA  Take 1 tablet (4 mg total) by mouth every 12 (twelve) hours.     famotidine 20 MG tablet  Commonly known as: PEPCID  Take 1 tablet (20 mg total) by mouth 2 (two) times daily.     fluticasone propionate 50 mcg/actuation nasal spray  Commonly known as: FLONASE  1 spray (50 mcg total) by Each Nostril route daily as needed for Allergies.     FLUZONE HIGHDOSE QUAD 23-24  mcg/0.7 mL Syrg  Generic drug: flu vacc [id number](65yr up)-PF     * furosemide 20 MG tablet  Commonly known as: LASIX  One po QAM, and one po at lunchtime if swelling prn     * furosemide 80 MG tablet  Commonly known as: LASIX  Take 1 tablet (80 mg total) by mouth every morning.     gabapentin 100 MG capsule  Commonly known as: NEURONTIN  Take 1 capsule (100 mg total) by mouth as needed (pain).     hydrALAZINE 50 MG tablet  Commonly known as: APRESOLINE  TAKE 1 TABLET (50 MG TOTAL) BY MOUTH 3 (THREE) TIMES DAILY.     metoprolol succinate 25 MG 24 hr tablet  Commonly known as:  TOPROL-XL  Take 0.5 tablets (12.5 mg total) by mouth 2 (two) times daily.     MITIGARE 0.6 mg Cap  Generic drug: colchicine  TAKE 1 CAPSULE BY MOUTH TWICE A DAY AS NEEDED GOUT FLARE UP TO 3 DAYS     multivitamin per tablet  Commonly known as: THERAGRAN  Take 1 tablet by mouth Daily.     NIFEdipine 60 MG (OSM) 24 hr tablet  Commonly known as: PROCARDIA-XL  Take 1 tablet (60 mg total) by mouth 2 (two) times a day.     nitroGLYCERIN 0.4 MG SL tablet  Commonly known as: NITROSTAT  Place 1 tablet (0.4 mg total) under the tongue every 5 (five) minutes as needed for Chest pain.     omeprazole 20 MG capsule  Commonly known as: PRILOSEC  Take 1 capsule (20 mg total) by mouth daily as needed (heartburn).     paricalcitoL 1 MCG capsule  Commonly known as: ZEMPLAR  TAKE 1 CAPSULE ON MONDAY, WEDNESDAY AND FRIDAY     PFIZER COVID BIVAL(12Y UP)(PF) 30 mcg/0.3 mL injection  Generic drug: sars-cov-2 (covid-19 pfizer omicron)  Inject into the muscle.     potassium chloride 10 MEQ Tbsr  Commonly known as: KLOR-CON  Take 1 tablet (10 mEq total) by mouth daily as needed (to be taken with lasix).,     pulse oximeter device  Commonly known as: pulse oximeter  by Apply Externally route 2 (two) times a day. Use twice daily at 8 AM and 3 PM and record the value in MyChart as directed.     tacrolimus 1 MG Cap  Commonly known as: PROGRAF  Take 3 capsules (3 mg total) by mouth every 12 (twelve) hours.           * This list has 4 medication(s) that are the same as other medications prescribed for you. Read the directions carefully, and ask your doctor or other care provider to review them with you.                  Indwelling Lines/Drains at time of discharge:   Lines/Drains/Airways       None                   Time spent on the discharge of patient: 36 minutes         Catherine Sarmiento PA-C  Department of Hospital Medicine  [facility]

## 2023-11-26 NOTE — ASSESSMENT & PLAN NOTE
Patient's COPD is with exacerbation noted by continued dyspnea currently.  Patient is currently off COPD Pathway. Continue scheduled inhalers Supplemental oxygen and monitor respiratory status closely.   - SOB likely due to COPD exacerbation vs CHF   - dounebs q4h awake   - continue home prednisone daily, but add 40 mg daily x 5 days in setting of acute exacerbation   - procal positive. Treat with azithromycin x 5 days.   - reinforced need for controller inhalers

## 2023-11-26 NOTE — ASSESSMENT & PLAN NOTE
- history of renal transplant   - KTM consulted   - continue tacro and prednisone - tacro level monitored during stay. no dose adjustments recommended

## 2023-11-26 NOTE — ASSESSMENT & PLAN NOTE
Patient with Paroxysmal (<7 days) atrial fibrillation which is controlled currently with Beta Blocker and Amiodarone. Patient is currently in sinus rhythm.[id number] Score: 2. Anticoagulation indicated. Anticoagulation done with eliquis .

## 2023-11-27 ENCOUNTER — PATIENT MESSAGE (OUTPATIENT)
Dept: INTERNAL MEDICINE | Facility: CLINIC | Age: 68
End: 2023-11-27
Payer: MEDICARE

## 2023-11-27 ENCOUNTER — TELEPHONE (OUTPATIENT)
Dept: VASCULAR SURGERY | Facility: CLINIC | Age: 68
End: 2023-11-27
Payer: MEDICARE

## 2023-11-27 NOTE — TELEPHONE ENCOUNTER
Contacted pt with Dr. Melendez's instructions to hold Plavix for 7 days prior to AVF creation on 12/18/23 with last dose of Plavix being Jesse 12/10/23. Pt repeated instructions as well as instructions to hold Eliquis for 2 days prior to surgery, verbalized understanding. Will contact pt with time of arrival.

## 2023-11-28 ENCOUNTER — OFFICE VISIT (OUTPATIENT)
Dept: NEPHROLOGY | Facility: CLINIC | Age: 68
End: 2023-11-28
Payer: MEDICARE

## 2023-11-28 ENCOUNTER — TELEPHONE (OUTPATIENT)
Dept: NEPHROLOGY | Facility: CLINIC | Age: 68
End: 2023-11-28
Payer: MEDICARE

## 2023-11-28 DIAGNOSIS — Z94.0 H/O KIDNEY TRANSPLANT: Chronic | ICD-10-CM

## 2023-11-28 DIAGNOSIS — I25.10 CORONARY ARTERY DISEASE INVOLVING NATIVE CORONARY ARTERY OF NATIVE HEART WITHOUT ANGINA PECTORIS: ICD-10-CM

## 2023-11-28 DIAGNOSIS — R73.03 PREDIABETES: ICD-10-CM

## 2023-11-28 DIAGNOSIS — N18.5 CKD (CHRONIC KIDNEY DISEASE) STAGE 5, GFR LESS THAN 15 ML/MIN: ICD-10-CM

## 2023-11-28 DIAGNOSIS — E55.9 VITAMIN D DEFICIENCY: ICD-10-CM

## 2023-11-28 DIAGNOSIS — D63.1 ANEMIA IN STAGE 5 CHRONIC KIDNEY DISEASE, NOT ON CHRONIC DIALYSIS: ICD-10-CM

## 2023-11-28 DIAGNOSIS — N25.81 SECONDARY RENAL HYPERPARATHYROIDISM: ICD-10-CM

## 2023-11-28 DIAGNOSIS — I13.0 HYPERTENSIVE HEART AND RENAL DISEASE WITH RENAL FAILURE, STAGE 1 THROUGH STAGE 4 OR UNSPECIFIED CHRONIC KIDNEY DISEASE, WITH HEART FAILURE: ICD-10-CM

## 2023-11-28 DIAGNOSIS — I50.32 CHRONIC DIASTOLIC HEART FAILURE: ICD-10-CM

## 2023-11-28 DIAGNOSIS — Z94.0 IMMUNOSUPPRESSIVE MANAGEMENT ENCOUNTER FOLLOWING KIDNEY TRANSPLANT: ICD-10-CM

## 2023-11-28 DIAGNOSIS — N18.5 ANEMIA IN STAGE 5 CHRONIC KIDNEY DISEASE, NOT ON CHRONIC DIALYSIS: ICD-10-CM

## 2023-11-28 DIAGNOSIS — Z79.899 IMMUNOSUPPRESSIVE MANAGEMENT ENCOUNTER FOLLOWING KIDNEY TRANSPLANT: ICD-10-CM

## 2023-11-28 DIAGNOSIS — I48.0 PAROXYSMAL ATRIAL FIBRILLATION: ICD-10-CM

## 2023-11-28 DIAGNOSIS — Z94.0 KIDNEY REPLACED BY TRANSPLANT: Primary | ICD-10-CM

## 2023-11-28 DIAGNOSIS — I10 PRIMARY HYPERTENSION: ICD-10-CM

## 2023-11-28 PROCEDURE — 3288F PR FALLS RISK ASSESSMENT DOCUMENTED: ICD-10-PCS | Mod: CPTII,95,, | Performed by: INTERNAL MEDICINE

## 2023-11-28 PROCEDURE — 1111F DSCHRG MED/CURRENT MED MERGE: CPT | Mod: CPTII,95,, | Performed by: INTERNAL MEDICINE

## 2023-11-28 PROCEDURE — 3062F PR POS MACROALBUMINURIA RESULT DOCUMENTED/REVIEW: ICD-10-PCS | Mod: CPTII,95,, | Performed by: INTERNAL MEDICINE

## 2023-11-28 PROCEDURE — 1101F PR PT FALLS ASSESS DOC 0-1 FALLS W/OUT INJ PAST YR: ICD-10-PCS | Mod: CPTII,95,, | Performed by: INTERNAL MEDICINE

## 2023-11-28 PROCEDURE — 3062F POS MACROALBUMINURIA REV: CPT | Mod: CPTII,95,, | Performed by: INTERNAL MEDICINE

## 2023-11-28 PROCEDURE — 3044F HG A1C LEVEL LT 7.0%: CPT | Mod: CPTII,95,, | Performed by: INTERNAL MEDICINE

## 2023-11-28 PROCEDURE — 3288F FALL RISK ASSESSMENT DOCD: CPT | Mod: CPTII,95,, | Performed by: INTERNAL MEDICINE

## 2023-11-28 PROCEDURE — 99215 PR OFFICE/OUTPT VISIT, EST, LEVL V, 40-54 MIN: ICD-10-PCS | Mod: 95,,, | Performed by: INTERNAL MEDICINE

## 2023-11-28 PROCEDURE — 3066F NEPHROPATHY DOC TX: CPT | Mod: CPTII,95,, | Performed by: INTERNAL MEDICINE

## 2023-11-28 PROCEDURE — 3066F PR DOCUMENTATION OF TREATMENT FOR NEPHROPATHY: ICD-10-PCS | Mod: CPTII,95,, | Performed by: INTERNAL MEDICINE

## 2023-11-28 PROCEDURE — 1159F PR MEDICATION LIST DOCUMENTED IN MEDICAL RECORD: ICD-10-PCS | Mod: CPTII,95,, | Performed by: INTERNAL MEDICINE

## 2023-11-28 PROCEDURE — 99215 OFFICE O/P EST HI 40 MIN: CPT | Mod: 95,,, | Performed by: INTERNAL MEDICINE

## 2023-11-28 PROCEDURE — 3044F PR MOST RECENT HEMOGLOBIN A1C LEVEL <7.0%: ICD-10-PCS | Mod: CPTII,95,, | Performed by: INTERNAL MEDICINE

## 2023-11-28 PROCEDURE — 1159F MED LIST DOCD IN RCRD: CPT | Mod: CPTII,95,, | Performed by: INTERNAL MEDICINE

## 2023-11-28 PROCEDURE — 1126F AMNT PAIN NOTED NONE PRSNT: CPT | Mod: CPTII,95,, | Performed by: INTERNAL MEDICINE

## 2023-11-28 PROCEDURE — 1126F PR PAIN SEVERITY QUANTIFIED, NO PAIN PRESENT: ICD-10-PCS | Mod: CPTII,95,, | Performed by: INTERNAL MEDICINE

## 2023-11-28 PROCEDURE — 1101F PT FALLS ASSESS-DOCD LE1/YR: CPT | Mod: CPTII,95,, | Performed by: INTERNAL MEDICINE

## 2023-11-28 PROCEDURE — 1111F PR DISCHARGE MEDS RECONCILED W/ CURRENT OUTPATIENT MED LIST: ICD-10-PCS | Mod: CPTII,95,, | Performed by: INTERNAL MEDICINE

## 2023-11-28 RX ORDER — PREDNISONE 20 MG/1
40 TABLET ORAL DAILY
Qty: 10 TABLET | Refills: 0 | Status: SHIPPED | OUTPATIENT
Start: 2023-11-28 | End: 2023-12-03

## 2023-11-28 RX ORDER — ISOSORBIDE MONONITRATE 30 MG/1
120 TABLET, EXTENDED RELEASE ORAL DAILY
Qty: 120 TABLET | Refills: 11 | Status: ON HOLD | OUTPATIENT
Start: 2023-11-28 | End: 2024-03-02 | Stop reason: HOSPADM

## 2023-11-28 RX ORDER — COVID-19 VACCINE, MRNA 50 UG/.5ML
INJECTION, SUSPENSION INTRAMUSCULAR
COMMUNITY
Start: 2023-09-22

## 2023-11-28 NOTE — PROGRESS NOTES
Subjective:       Patient ID: Ibrahima Phelps is a 68 y.o. Black or  male who presents for follow-up evaluation of Chronic Kidney Disease and Kidney Transplant      HPI   He is getting over an URI, has not 'gone to his chest' but now feeling better. He continues to smoke a few cigarettes a day. Off PPI for several months now and without dyspepsia. No LE edema and no SOB.  He's noticed some allograft 'twinges' but no overt pain but no problems with urination. His wife is still working at [employer] as a manager of housing for [employer].     Interval history March 2019: he has three concerns:  1. Worsening tremors. Spilling corn and trouble with writing  2. Increased nocturia  3. Cold intolerance    He denies recent illness. No allogarft pain and no LUTS other than nocturia. He has unintentionally lost weight. No new medications. He is doing well off PPI. He is smoking 3 cigarettes to 3/4ppd depending on the day.     Interval history July 2019: he was hospitalized for CP and was found to be in SVT, his potassium was low in ER. He was CP free in ER after X 1 SL NTG and after SVT broke. Since then he feels well. He has lost a little bit of weight. Still smoking     Interval history Nov 2019: feels poorly--has URI and lost voice, no sick contacts. Kidney txp is managing Prograf--Cr is higher than baseline but Prograf is running high. No LUTS and no allograft pain.     Interval history April 2020:  The patient location is: Home  The chief complaint leading to consultation is: CKD and kidney transplant recioient  Visit type: audiovisual  Total time spent with patient:  28 minutes  Each patient to whom he or she provides medical services by telemedicine is:  (1) informed of the relationship between the physician and patient and the respective role of any other health care provider with respect to management of the patient; and (2) notified that he or she may decline to receive medical services by telemedicine and  may withdraw from such care at any time.  Notes: He is doing well. He stays home, his wife does the shopping and errands. He was admitted to the hospital in late March for chest pain, felt to be musculoskeletal. They started hydralazine for BP, lowered too much at home with symptoms and Cards advised to cut in half. No LE edema. No allograft pain and no LUTS. He still takes his calcium as instructed.   Interval lhistory Aug 2020:  He feels well. No hospital stays since last visit. He notes a skin lesion to right wrist, not pruritic, he noted similar spots when he was on coumadin but not as many when he was on Eliquis, it is actually improving. It  doesn't justice. He had one day of allograft tenderness but no LUTS associated with it and no recurrence. No new medications.     Interval history Jan 2021:   The patient location is: Home  The chief complaint leading to consultation is: CKD and kidney transplant   Visit type: audiovisual  Face to Face time with patient: 32 minutes  51 minutes of total time spent on the encounter, which includes face to face time and non-face to face time preparing to see the patient (eg, review of tests), Obtaining and/or reviewing separately obtained history, Documenting clinical information in the electronic or other health record, Independently interpreting results (not separately reported) and communicating results to the patient/family/caregiver, or Care coordination (not separately reported).   Each patient to whom he or she provides medical services by telemedicine is:  (1) informed of the relationship between the physician and patient and the respective role of any other health care provider with respect to management of the patient; and (2) notified that he or she may decline to receive medical services by telemedicine and may withdraw from such care at any time.  Notes: He notes BP is high. He feels well. He remains feeling cold most of the time. No new medications.     Interval  history July 2021: He is doing well. Brings a BP log with the average above goal. No CP     Interval History Jan 2022: He is feeling well. No allograft pain. No recent ER visits. Tremors about the same--saw Neuro     Interval History June 2022: He saw ENT and diagnosed with BPV, iproving. New baseline allograft function, has seen txp very closely. RLE edema. Has history of smoking, QUIT in Feb 2022     Sep 2022: He is feeling well. ER visit for gout of elbow and ER visit for SOB improved with Lasix IV dose.     Jan 2023: Herniated disc flare, needs Flexeril, Abdominal muscle cramping felt to be due to hypocalcemia, but has GI follow up. Hb dropped but no black tarry stools    May 2023:  The patient location is: [place]  The chief complaint leading to consultation is: CKD, kidney txp status   Visit type: audiovisual  60 minutes of total time spent on the encounter, which includes face to face time and non-face to face time preparing to see the patient (eg, review of tests), Obtaining and/or reviewing separately obtained history, Documenting clinical information in the electronic or other health record, Independently interpreting results (not separately reported) and communicating results to the patient/family/caregiver, or Care coordination (not separately reported).   Each patient to whom he or she provides medical services by telemedicine is:  (1) informed of the relationship between the physician and patient and the respective role of any other health care provider with respect to management of the patient; and (2) notified that he or she may decline to receive medical services by telemedicine and may withdraw from such care at any time.  Notes: Since last visit he had a prolonged hospital stay at [facility] with [facility] and there was discussion of dialysis. He was discharged home with a Hamilton catheter then developed UTI, hospitalized at [facility] for a couple of days. His only complaint to me is his shoulder hurting, legs  still weak, appetite is good. MMF is on hold.     Sep 2023: Seeing EP, saw neuro. Restarted smoking, 5-6 cigarettes,     Nov 6, 2023:   The patient location is: [place]  The chief complaint leading to consultation is: CKD kidney txp status   Visit type: audiovisual  60 minutes of total time spent on the encounter, which includes face to face time and non-face to face time preparing to see the patient (eg, review of tests), Obtaining and/or reviewing separately obtained history, Documenting clinical information in the electronic or other health record, Independently interpreting results (not separately reported) and communicating results to the patient/family/caregiver, or Care coordination (not separately reported).   Each patient to whom he or she provides medical services by telemedicine is:  (1) informed of the relationship between the physician and patient and the respective role of any other health care provider with respect to management of the patient; and (2) notified that he or she may decline to receive medical services by telemedicine and may withdraw from such care at any time.  Notes: Agreed to HD if needed, he recalls being on HD and felt like he was surviving not living'. SOB intermittent, yes orthopnea and DAUGHERTY.  LE edema none. Abd distention--yes.  Poor appetite, but for several months now, as multiple admissions. Has pruritus of legs. Still smoking 5 cig/day. NTG tab prn CP, started pre hospital and only once with chest pain since d/c.     Nov 28, 2023:  The patient location is: [place]  The chief complaint leading to consultation is: CKD, kidney txp status   Visit type: audiovisual  55 minutes of total time spent on the encounter, which includes face to face time and non-face to face time preparing to see the patient (eg, review of tests), Obtaining and/or reviewing separately obtained history, Documenting clinical information in the electronic or other health record, Independently interpreting results (not  separately reported) and communicating results to the patient/family/caregiver, or Care coordination (not separately reported).   Each patient to whom he or she provides medical services by telemedicine is:  (1) informed of the relationship between the physician and patient and the respective role of any other health care provider with respect to management of the patient; and (2) notified that he or she may decline to receive medical services by telemedicine and may withdraw from such care at any time.  Notes: Hospital stay for COPD exacerbation and likely some volume overload, temp AUR as well, seemingly resolved (?) He continues on Lasix 80mg QD. Still with sinus congestion. He denies nausea. He will have queasy stomach with taking meds on empty stomach. No dysgeusia, no pruritus. Family concerned he is not eating enough.         Review of Systems   Constitutional:  Positive for activity change, appetite change and fatigue.   HENT:  Positive for postnasal drip and sinus pressure. Negative for facial swelling.    Respiratory:  Positive for cough. Negative for shortness of breath.    Cardiovascular:  Negative for leg swelling.   Gastrointestinal:  Negative for abdominal pain, nausea and vomiting.   Genitourinary:  Positive for frequency.   Musculoskeletal:  Positive for arthralgias and back pain.   Allergic/Immunologic: Positive for immunocompromised state (kidney txp).   Neurological:  Positive for tremors.   Psychiatric/Behavioral:  Negative for confusion and decreased concentration.        Objective:      Physical Exam  Constitutional:       General: He is not in acute distress.     Appearance: He is not ill-appearing or toxic-appearing.   Pulmonary:      Effort: Pulmonary effort is normal. No respiratory distress.   Neurological:      Mental Status: He is alert and oriented to person, place, and time.   Psychiatric:         Mood and Affect: Mood normal.         Assessment:       1. Kidney replaced by transplant     2. CKD (chronic kidney disease) stage 5, GFR less than 15 ml/min    3. H/O kidney transplant    4. Secondary renal hyperparathyroidism    5. Vitamin D deficiency    6. Immunosuppressive management encounter following kidney transplant    7. Prediabetes    8. Paroxysmal atrial fibrillation    9. Coronary artery disease involving native coronary artery of native heart without angina pectoris    10. Anemia in stage 5 chronic kidney disease, not on chronic dialysis    11. Primary hypertension    12. Chronic diastolic heart failure    13. 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   - now at CKD Stage 5     HTN--controlled at home, monitor, on Lasix for diuretic BP control. 130/58 ws BP yesterday     Hyperglycemia post transplant, steroid induced/PreDiabetes--monitor     Mineral and Bone Disease s/p parathyroidectomy for SHPT--continue current treatment with calcium supplements, Zemplar and D3. Phoslo for phos binder    CHF.Diastolic Dysfunction/Volume overload--continue Lasix 80mg QAM, continue low sodium diet, BP and HR control. Cards follow up for afib

## 2023-11-30 ENCOUNTER — OUTPATIENT CASE MANAGEMENT (OUTPATIENT)
Dept: ADMINISTRATIVE | Facility: OTHER | Age: 68
End: 2023-11-30
Payer: MEDICARE

## 2023-12-01 ENCOUNTER — OUTPATIENT CASE MANAGEMENT (OUTPATIENT)
Dept: ADMINISTRATIVE | Facility: OTHER | Age: 68
End: 2023-12-01
Payer: MEDICARE

## 2023-12-01 ENCOUNTER — TELEPHONE (OUTPATIENT)
Dept: CARDIOLOGY | Facility: CLINIC | Age: 68
End: 2023-12-01
Payer: MEDICARE

## 2023-12-01 NOTE — PROGRESS NOTES
Outpatient Care Management   - High Risk Patient Assessment    Patient: Ibrahima Phelps  MRN:  [id number]  Date of Service:  12/1/2023  Completed by:  Jacqui Brian LMSW  Referral Date: 10/15/2023    Reason for Visit   Patient presents with    Social Work Assessment - High Risk    Case Closure     12/01/2023      OPCM Post Discharge     12/01/2023         Brief Summary:  received a referral from OPCM RUSH Arredondo,RUSH for a post hospital d/c. OPCM SW completed assessment with patient. Patient denied any needs or concerns for SW to address. Denied any questions. OPCM SW will close case. Notified OPCM RUSH Arredondo, RN.

## 2023-12-01 NOTE — TELEPHONE ENCOUNTER
RN spoke with pt and wife who refused participation in [facility]. RN will dis enroll at this time.

## 2023-12-12 RX ORDER — TACROLIMUS 1 MG/1
3 CAPSULE ORAL EVERY 12 HOURS
Qty: 180 CAPSULE | Refills: 11 | Status: SHIPPED | OUTPATIENT
Start: 2023-12-12 | End: 2024-01-23

## 2023-12-14 ENCOUNTER — OUTPATIENT CASE MANAGEMENT (OUTPATIENT)
Dept: ADMINISTRATIVE | Facility: OTHER | Age: 68
End: 2023-12-14
Payer: MEDICARE

## 2023-12-15 ENCOUNTER — TELEPHONE (OUTPATIENT)
Dept: VASCULAR SURGERY | Facility: CLINIC | Age: 68
End: 2023-12-15
Payer: MEDICARE

## 2023-12-15 NOTE — PRE-PROCEDURE INSTRUCTIONS
PRE-OP INSTRUCTIONS:  Instructed to have nothing by mouth past midnight.  It is ok to take AM medications with a few sips of water.  Instructed to shower the night before surgery as well as the morning of surgery with an antibacterial soap like dial or hibiclens from the neck down.  Encouraged to wear loose fitting, comfortable clothing .  Medication instructions for pm prior to and am of surgery reviewed.  Instructed to avoid taking vitamins,supplements or ibuprofen the am of surgery.    Patient denies any side effects or issues with anesthesia or sedation.     Patient does not know arrival time.Explained that this information comes from the surgeon's office and if they haven't heard from them by 2 or 3 pm to call the office.Patient stated an understanding.

## 2023-12-15 NOTE — TELEPHONE ENCOUNTER
Pt states he's been holding Plavix since Monday 12/11/23 and that he began holding Eliquis today in preparation for surgery Monday 12/18/23 with Dr. Melendez.

## 2023-12-17 ENCOUNTER — ANESTHESIA EVENT (OUTPATIENT)
Dept: SURGERY | Facility: HOSPITAL | Age: 68
End: 2023-12-17
Payer: MEDICARE

## 2023-12-17 NOTE — PLAN OF CARE
Spoke to  And . Dalton Phelps, they are amenable to move up the date of surgery tomorrow Monday December 18th to 8:00 a.m..  Requested that they arrive 2 hours prior at 6:00 a.m. which they are agreeable to.  All questions answered.

## 2023-12-18 ENCOUNTER — ANESTHESIA (OUTPATIENT)
Dept: SURGERY | Facility: HOSPITAL | Age: 68
End: 2023-12-18
Payer: MEDICARE

## 2023-12-18 ENCOUNTER — HOSPITAL ENCOUNTER (OUTPATIENT)
Facility: HOSPITAL | Age: 68
Discharge: HOME | End: 2023-12-18
Attending: SURGERY | Admitting: SURGERY
Payer: MEDICARE

## 2023-12-18 VITALS
DIASTOLIC BLOOD PRESSURE: 63 MMHG | SYSTOLIC BLOOD PRESSURE: 132 MMHG | RESPIRATION RATE: 18 BRPM | BODY MASS INDEX: 27.75 KG/M2 | OXYGEN SATURATION: 99 % | TEMPERATURE: 98 F | WEIGHT: 210.31 LBS | HEART RATE: 60 BPM

## 2023-12-18 DIAGNOSIS — N18.5 CKD (CHRONIC KIDNEY DISEASE) STAGE 5, GFR LESS THAN 15 ML/MIN: ICD-10-CM

## 2023-12-18 PROCEDURE — 71000044 HC DOSC ROUTINE RECOVERY FIRST HOUR: Performed by: SURGERY

## 2023-12-18 PROCEDURE — 71000015 HC POSTOP RECOV 1ST HR: Performed by: SURGERY

## 2023-12-18 PROCEDURE — D9220A PRA ANESTHESIA: Mod: CRNA,,, | Performed by: NURSE ANESTHETIST, CERTIFIED REGISTERED

## 2023-12-18 PROCEDURE — 36000706: Performed by: SURGERY

## 2023-12-18 PROCEDURE — 36821 PR ANASTOMOSIS,AV,ANY SITE: ICD-10-PCS | Mod: LT,,, | Performed by: SURGERY

## 2023-12-18 PROCEDURE — 63600175 PHARM REV CODE 636 W HCPCS: Performed by: ANESTHESIOLOGY

## 2023-12-18 PROCEDURE — 71000016 HC POSTOP RECOV ADDL HR: Performed by: SURGERY

## 2023-12-18 PROCEDURE — 63600175 PHARM REV CODE 636 W HCPCS: Performed by: SURGERY

## 2023-12-18 PROCEDURE — D9220A PRA ANESTHESIA: Mod: ANES,,, | Performed by: STUDENT IN AN ORGANIZED HEALTH CARE EDUCATION/TRAINING PROGRAM

## 2023-12-18 PROCEDURE — 37000008 HC ANESTHESIA 1ST 15 MINUTES: Performed by: SURGERY

## 2023-12-18 PROCEDURE — 25000003 PHARM REV CODE 250: Performed by: NURSE ANESTHETIST, CERTIFIED REGISTERED

## 2023-12-18 PROCEDURE — 63600175 PHARM REV CODE 636 W HCPCS

## 2023-12-18 PROCEDURE — D9220A PRA ANESTHESIA: ICD-10-PCS | Mod: CRNA,,, | Performed by: NURSE ANESTHETIST, CERTIFIED REGISTERED

## 2023-12-18 PROCEDURE — 36821 AV FUSION DIRECT ANY SITE: CPT | Mod: LT,,, | Performed by: SURGERY

## 2023-12-18 PROCEDURE — 25000003 PHARM REV CODE 250

## 2023-12-18 PROCEDURE — 37000009 HC ANESTHESIA EA ADD 15 MINS: Performed by: SURGERY

## 2023-12-18 PROCEDURE — 25000003 PHARM REV CODE 250: Performed by: SURGERY

## 2023-12-18 PROCEDURE — D9220A PRA ANESTHESIA: ICD-10-PCS | Mod: ANES,,, | Performed by: STUDENT IN AN ORGANIZED HEALTH CARE EDUCATION/TRAINING PROGRAM

## 2023-12-18 PROCEDURE — 63600175 PHARM REV CODE 636 W HCPCS: Performed by: NURSE ANESTHETIST, CERTIFIED REGISTERED

## 2023-12-18 PROCEDURE — 36000707: Performed by: SURGERY

## 2023-12-18 RX ORDER — PROPOFOL 10 MG/ML
VIAL (ML) INTRAVENOUS CONTINUOUS PRN
Status: DISCONTINUED | OUTPATIENT
Start: 2023-12-18 | End: 2023-12-18

## 2023-12-18 RX ORDER — HEPARIN SODIUM 1000 [USP'U]/ML
INJECTION, SOLUTION INTRAVENOUS; SUBCUTANEOUS
Status: DISCONTINUED | OUTPATIENT
Start: 2023-12-18 | End: 2023-12-18

## 2023-12-18 RX ORDER — ONDANSETRON 2 MG/ML
INJECTION INTRAMUSCULAR; INTRAVENOUS
Status: DISCONTINUED | OUTPATIENT
Start: 2023-12-18 | End: 2023-12-18

## 2023-12-18 RX ORDER — SODIUM CHLORIDE 9 MG/ML
INJECTION, SOLUTION INTRAVENOUS CONTINUOUS
Status: DISCONTINUED | OUTPATIENT
Start: 2023-12-18 | End: 2023-12-18 | Stop reason: HOSPADM

## 2023-12-18 RX ORDER — PROTAMINE SULFATE 10 MG/ML
INJECTION, SOLUTION INTRAVENOUS
Status: DISCONTINUED | OUTPATIENT
Start: 2023-12-18 | End: 2023-12-18

## 2023-12-18 RX ORDER — FENTANYL CITRATE 50 UG/ML
INJECTION, SOLUTION INTRAMUSCULAR; INTRAVENOUS
Status: DISCONTINUED | OUTPATIENT
Start: 2023-12-18 | End: 2023-12-18

## 2023-12-18 RX ORDER — HYDROCODONE BITARTRATE AND ACETAMINOPHEN 5; 325 MG/1; MG/1
1 TABLET ORAL EVERY 6 HOURS PRN
Qty: 15 TABLET | Refills: 0 | Status: SHIPPED | OUTPATIENT
Start: 2023-12-18

## 2023-12-18 RX ORDER — PAPAVERINE HYDROCHLORIDE 30 MG/ML
INJECTION INTRAMUSCULAR; INTRAVENOUS
Status: DISCONTINUED | OUTPATIENT
Start: 2023-12-18 | End: 2023-12-18 | Stop reason: HOSPADM

## 2023-12-18 RX ORDER — MIDAZOLAM HYDROCHLORIDE 1 MG/ML
INJECTION, SOLUTION INTRAMUSCULAR; INTRAVENOUS
Status: DISCONTINUED | OUTPATIENT
Start: 2023-12-18 | End: 2023-12-18

## 2023-12-18 RX ORDER — HEPARIN SODIUM 1000 [USP'U]/ML
INJECTION, SOLUTION INTRAVENOUS; SUBCUTANEOUS
Status: DISCONTINUED | OUTPATIENT
Start: 2023-12-18 | End: 2023-12-18 | Stop reason: HOSPADM

## 2023-12-18 RX ADMIN — PROTAMINE SULFATE 20 MG: 10 INJECTION, SOLUTION INTRAVENOUS at 10:12

## 2023-12-18 RX ADMIN — ONDANSETRON 4 MG: 2 INJECTION INTRAMUSCULAR; INTRAVENOUS at 08:12

## 2023-12-18 RX ADMIN — HEPARIN SODIUM 5000 UNITS: 1000 INJECTION, SOLUTION INTRAVENOUS; SUBCUTANEOUS at 09:12

## 2023-12-18 RX ADMIN — SODIUM CHLORIDE: 0.9 INJECTION, SOLUTION INTRAVENOUS at 08:12

## 2023-12-18 RX ADMIN — MEPIVACAINE HYDROCHLORIDE 30 ML: 20 INJECTION, SOLUTION EPIDURAL; INFILTRATION at 08:12

## 2023-12-18 RX ADMIN — FENTANYL CITRATE 50 MCG: 50 INJECTION, SOLUTION INTRAMUSCULAR; INTRAVENOUS at 08:12

## 2023-12-18 RX ADMIN — MIDAZOLAM HYDROCHLORIDE 2 MG: 1 INJECTION, SOLUTION INTRAMUSCULAR; INTRAVENOUS at 08:12

## 2023-12-18 RX ADMIN — PROPOFOL 50 MCG/KG/MIN: 10 INJECTION, EMULSION INTRAVENOUS at 08:12

## 2023-12-18 RX ADMIN — PROTAMINE SULFATE 10 MG: 10 INJECTION, SOLUTION INTRAVENOUS at 10:12

## 2023-12-18 RX ADMIN — SODIUM CHLORIDE 0.3 MCG/KG/MIN: 9 INJECTION, SOLUTION INTRAVENOUS at 08:12

## 2023-12-18 RX ADMIN — CEFAZOLIN 2 G: 2 INJECTION, POWDER, FOR SOLUTION INTRAMUSCULAR; INTRAVENOUS at 08:12

## 2023-12-18 NOTE — PLAN OF CARE
Patient and spouse received discharge instructions, verbalize understanding. No complaints at this time. Will discharge home. Beside delivery to deliver medications.

## 2023-12-18 NOTE — PROGRESS NOTES
12/18/23 0710   Provider Notification   Reason for Communication Evaluate  (Recent use of nitroglycerin for chest pain)   Provider Name Pavel   Provider Role Attending physician   Method of Communication Face to face   Response At bedside   Notification Time 0745     Minerva Zhao and Al notified of pt's recent use of nitroglycerin. They spoke w/ pt and wife at length. OK to proceed w/ surgery.

## 2023-12-18 NOTE — DISCHARGE INSTRUCTIONS
VASCULAR SURGERY DISCHARGE INSTRUCTIONS    Woundcare:  - Take your incision dressing off 2 days after your surgery and gently rinse your incision with soap and water daily. Pad the incision dry afterward  - If you have a dialysis catheter in place, keep your catheter dressing clean and dry  - If you do not have a catheter, take a full shower daily beginning 2 days after the surgery. Allow soap and water to run over your incision. Pad the incision dry afterward  - When resting or sleeping, try to keep your arm elevated to shoulder level on pillows to reduce swelling  - If you notice clear drainage from your incision, you can apply dry gauze daily and secure in place with tape or gentle elastic wrap    Activity:  - Avoid prolonged exertion of the affected arm  - Avoid keeping your arm down below your chest for prolonged periods of time (this could lead to increased swelling)  - No heavy lifting with the affected arm  - Sleep with your arm elevated on pillows at night to reduce swelling  -- No swimming in pools, [employer], [employer] etc. for 6 weeks after your surgery    Diet:  -Resume your pre-operative home diet    Follow up:  -Refer to follow up instructions     Call Vascular Surgery Office if you experience:  -Increased redness, warmth, tenderness, or draining pus at your incision(s)  -Worsening fevers, chills, nausea/vomiting  -Pain, weakness, coldness, or numbness in your hand  -Uncontrolled pain  -Your call will be returned within 24 hours and further instructions will be provided    Go to ER/Urgent Care if you experience:  -Worsening shortness of breath or chest pain

## 2023-12-18 NOTE — TRANSFER OF CARE
Anesthesia Transfer of Care Note    Patient: Ibrahima Phelps    Procedure(s) Performed: Procedure(s) (LRB):  CREATION, AV FISTULA (Left)    Patient location: [facility]    Anesthesia Type: general    Transport from OR: Transported from OR on 6-10 L/min O2 by face mask with adequate spontaneous ventilation    Post pain: adequate analgesia    Post assessment: no apparent anesthetic complications    Post vital signs: stable    Level of consciousness: awake    Nausea/Vomiting: no nausea/vomiting    Complications: none    Transfer of care protocol was followed    Last vitals: Visit Vitals  BP (!) 141/64 (BP Location: Right arm, Patient Position: Lying)   Pulse (!) 56   Temp 36.6 °C (97.8 °F) (Temporal)   Resp 18   Wt 95.4 kg (210 lb 5.1 oz)   SpO2 100%   BMI 27.75 kg/m²

## 2023-12-18 NOTE — INTERVAL H&P NOTE
The patient has been examined and the H&P has been reviewed:    I concur with the findings and no changes have occurred since H&P was written.    Anesthesia/Surgery risks, benefits and alternative options discussed and understood by patient/family.    Ethan Cooper MD  General Surgery PGY-1

## 2023-12-18 NOTE — ANESTHESIA PROCEDURE NOTES
L Supraclavicular SS + ICB    Patient location during procedure: OR    Reason for block: primary anesthetic    Diagnosis: L Arm Pain   Start time: 12/18/2023 8:31 AM  Timeout: 12/18/2023 8:30 AM   End time: 12/18/2023 8:38 AM    Staffing  Authorizing Provider: Carmelina Chavez MD  Performing Provider: Juan Chairez MD    Staffing  Performed by: Juan Chairez MD  Authorized by: Carmelina Chavez MD    Preanesthetic Checklist  Completed: patient identified, IV checked, site marked, risks and benefits discussed, surgical consent, monitors and equipment checked, pre-op evaluation and timeout performed  Peripheral Block  Patient position: supine  Prep: ChloraPrep  Patient monitoring: heart rate, cardiac monitor, continuous pulse ox, continuous capnometry and frequent blood pressure checks  Block type: supraclavicular  Laterality: left  Injection technique: single shot  Needle  Needle type: Stimuplex   Needle gauge: 20 G  Needle length: 4 in  Needle localization: anatomical landmarks and ultrasound guidance   -ultrasound image captured on disc.  Assessment  Injection assessment: negative aspiration, negative parasthesia and local visualized surrounding nerve  Paresthesia pain: none  Heart rate change: no  Slow fractionated injection: yes  Pain Tolerance: comfortable throughout block and no complaints  Medications:    Medications: mepivacaine (CARBOCAINE) injection 20 mg/mL (2%) - Perineural   30 mL - 12/18/2023 8:35:00 AM    Additional Notes  Intercostal brachial field block performed with mepivacaine 2.0% 10ml for additional coverage.    VSS.  See anesthesia record.  Patient tolerated procedure well.

## 2023-12-18 NOTE — ANESTHESIA PREPROCEDURE EVALUATION
12/18/2023  Ibrahima Phelps is a 68 y.o., male c Hx/o CKD s/p renal transplant, HTN, CAD c NSTEMI in the past, and carotid artery dz. Presenting today for AVF. He has had an increase in his use of nitro tabs for CP at rest at home within the last week. CP resolved within a minute or two of taking nitro.      Left Ventricle: The left ventricle is normal in size. Ventricular mass is normal. Normal wall thickness. There is concentric remodeling. Normal wall motion. There is normal systolic function with a visually estimated ejection fraction of 55 - 60%. Biplane (2D) method of discs ejection fraction is 55%. There is diastolic dysfunction. Elevated left ventricular filling pressure.    Right Ventricle: Normal right ventricular cavity size. Systolic function is normal.    Left Atrium: Left atrium is moderately dilated.    Aortic Valve: There is moderate aortic valve sclerosis. Moderately restricted motion. There is mild to moderate stenosis. Aortic valve area by VTI is 1.34 cm². Aortic valve peak velocity is 2.92 m/s. Mean gradient is 18 mmHg. The dimensionless index is 0.41. There is mild to moderate aortic regurgitation.    Mitral Valve: There is moderate posterior mitral annular calcification present. There is mild regurgitation.    Tricuspid Valve: There is mild regurgitation.    Pulmonic Valve: There is mild regurgitation.    Pulmonary Artery: The estimated pulmonary artery systolic pressure is 45 mmHg.    IVC/SVC: Intermediate venous pressure at 8 mmHg.    9/13/23 Stress    There is a small sized, mild intensity apical inferior and inferoseptal reversible perfusion abnormality involving 6% of LV myocardium indicative of focal coronary stenosis.    Within the perfusion abnormality, absolute myocardial perfusion (cc/min/gm) averaged 0.56 cc/min/g at rest, 0.71 cc/min/g at stress and CFR was 1.27 , which equates  to severely reduced coronary flow capacity within the LAD territory.    There are no other significant perfusion abnormalities.    The whole heart absolute myocardial perfusion values averaged 0.74 cc/min/g at rest, which is normal; 1.17 cc/min/g at stress, which is mildly reduced; and CFR is 1.59 , which is mildly reduced.    CT attenuation images demonstrate severe diffuse coronary calcifications in the LAD, and moderate diffuse coronary calcifications in the LCX and RCA territory and mild diffuse aortic calcifications in the descending aorta.    The gated perfusion images showed an ejection fraction of 53% at rest and 56% during stress. A normal ejection fraction is greater than 47%.    The wall motion is normal at rest and during stress.    The LV cavity size is mildly enlarged at rest and stress.    The ECG portion of the study is abnormal but not diagnostic due to resting ST-T abnormalities.    There were no arrhythmias during stress.    The patient reported chest pain during the stress test.    There are no prior studies for comparison.    Pre-operative evaluation for Procedure(s) (LRB):  CREATION, AV FISTULA (Left)    Patient Active Problem List   Diagnosis    Primary hypertension    CAD (coronary artery disease)    Mixed hyperlipidemia    Thrombocytopenia    Carotid artery bruit    Secondary renal hyperparathyroidism    History of adenomatous polyp of colon    Calcific tendinitis of left shoulder    Left rotator cuff tear    Obesity (BMI 30-39.9)    Impingement syndrome of left shoulder    SOB (shortness of breath)    Long term (current) use of anticoagulants [V58.61]    H/O kidney transplant    Hypertensive heart and kidney disease    Paroxysmal atrial fibrillation    Chronic obstructive pulmonary disease with acute exacerbation    Multiple lung nodules on CT    Colon adenoma    Colon polyps    Carotid artery disease    Kidney replaced by transplant    Postural tremor    Polyneuropathy    Former smoker     Snoring    Aortic atherosclerosis    Memory changes    Prophylactic immunotherapy    Pulmonary HTN    Palliative care encounter    Advance care planning    Goals of care, counseling/discussion    Immunodeficiency due to treatment with immunosuppressive medication    Acute on chronic heart failure with preserved ejection fraction    Anemia in stage 5 chronic kidney disease, not on chronic dialysis    Pyogenic arthritis of right hand    Hiccups    Benign essential tremor    Glucose intolerance    CKD (chronic kidney disease), stage IV    Consolidation lung    Unstable angina pectoris    Chest pain    Advanced care planning/counseling discussion    Immunosuppression    CKD (chronic kidney disease) stage 5, GFR less than 15 ml/min    Pre-op evaluation    Immunosuppressive management encounter following kidney transplant    NSTEMI (non-ST elevated myocardial infarction)    Chronic bronchitis            Medications Prior to Admission   Medication Sig Dispense Refill Last Dose    amiodarone (PACERONE) 200 MG Tab Take 1 tablet (200 mg total) by mouth once daily. 30 tablet 11 12/15/2023    apixaban (ELIQUIS) 5 mg Tab Take 1 tablet (5 mg total) by mouth 2 (two) times daily. 180 tablet 3 12/14/2023    atorvastatin (LIPITOR) 40 MG tablet Take 2 tablets (80 mg total) by mouth once daily. 180 tablet 3 12/15/2023    doxazosin (CARDURA) 4 MG tablet Take 1 tablet (4 mg total) by mouth every 12 (twelve) hours. 180 tablet 3 12/15/2023    famotidine (PEPCID) 20 MG tablet Take 1 tablet (20 mg total) by mouth 2 (two) times daily. 180 tablet 3 12/15/2023    hydrALAZINE (APRESOLINE) 50 MG tablet TAKE 1 TABLET (50 MG TOTAL) BY MOUTH 3 (THREE) TIMES DAILY. 270 tablet 3 12/15/2023    isosorbide mononitrate (IMDUR) 30 MG 24 hr tablet Take 4 tablets (120 mg total) by mouth once daily. 120 tablet 11 12/15/2023    metoprolol succinate (TOPROL-XL) 25 MG 24 hr tablet Take 0.5 tablets (12.5 mg total) by mouth 2 (two) times daily. 90 tablet 3  12/15/2023    NIFEdipine (PROCARDIA-XL) 60 MG (OSM) 24 hr tablet Take 1 tablet (60 mg total) by mouth 2 (two) times a day. 180 tablet 3 12/15/2023    predniSONE (DELTASONE) 5 MG tablet TAKE 1 TABLET BY MOUTH EVERY DAY IN THE MORNING 90 tablet 3 12/15/2023    tacrolimus (PROGRAF) 1 MG Cap Take 3 capsules (3 mg total) by mouth every 12 (twelve) hours. 180 capsule 11 12/15/2023    acetaminophen (TYLENOL) 500 MG tablet Take 1 tablet (500 mg total) by mouth every 6 (six) hours as needed for Pain. 20 tablet 0     albuterol (VENTOLIN HFA) 90 mcg/actuation inhaler Inhale 2 puffs into the lungs every 6 (six) hours as needed for Wheezing or Shortness of Breath. Rescue 18 g 3     albuterol-ipratropium (DUO-NEB) 2.5 mg-0.5 mg/3 mL nebulizer solution Take 3 mLs by nebulization every 6 (six) hours as needed for Wheezing. Rescue. Can be used in place of albuterol inhaler. 75 mL 0     b complex vitamins (B COMPLEX-VITAMIN B12) tablet Take 1 tablet by mouth once daily. 90 tablet 3     calcium acetate,phosphat bind, (PHOSLO) 667 mg capsule TAKE 1 CAPSULE BY MOUTH THREE TIMES A DAY WITH MEALS AND SNACKS 360 capsule 1     calcium carbonate (CALCIUM 600 ORAL) Take 1 tablet by mouth 2 (two) times a day.       CHOLECALCIFEROL, VITAMIN D3, ORAL Take 2 tablets by mouth 2 (two) times daily.       clopidogreL (PLAVIX) 75 mg tablet Take 1 tablet (75 mg total) by mouth once daily. 30 tablet 11     fexofenadine HCl (ALLEGRA ORAL) Take 1 tablet by mouth daily as needed (allergies).       fluticasone furoate-vilanteroL (BREO) 100-25 mcg/dose diskus inhaler Inhale 1 puff into the lungs once daily. Controller. Use this inhaler every day.       fluticasone propionate (FLONASE) 50 mcg/actuation nasal spray 1 spray (50 mcg total) by Each Nostril route daily as needed for Allergies.       FLUZONE HIGHDOSE QUAD 23-24  mcg/0.7 mL Syrg        furosemide (LASIX) 20 MG tablet One po QAM, and one po at lunchtime if swelling prn 180 tablet 1      furosemide (LASIX) 80 MG tablet Take 1 tablet (80 mg total) by mouth every morning. 90 tablet 1     gabapentin (NEURONTIN) 100 MG capsule Take 1 capsule (100 mg total) by mouth as needed (pain).       MITIGARE 0.6 mg Cap TAKE 1 CAPSULE BY MOUTH TWICE A DAY AS NEEDED GOUT FLARE UP TO 3 DAYS 15 capsule 11     multivitamin (THERAGRAN) per tablet Take 1 tablet by mouth Daily.       nitroGLYCERIN (NITROSTAT) 0.4 MG SL tablet Place 1 tablet (0.4 mg total) under the tongue every 5 (five) minutes as needed for Chest pain. 25 tablet 0     omeprazole (PRILOSEC) 20 MG capsule Take 1 capsule (20 mg total) by mouth daily as needed (heartburn).       paricalcitoL (ZEMPLAR) 1 MCG capsule TAKE 1 CAPSULE ON MONDAY, WEDNESDAY AND FRIDAY 36 capsule 3     PFIZER COVID BIVAL,12Y UP,,PF, 30 mcg/0.3 mL injection Inject into the muscle.       potassium chloride (KLOR-CON) 10 MEQ TbSR Take 1 tablet (10 mEq total) by mouth daily as needed (to be taken with lasix)., 90 tablet 3     pulse oximeter (PULSE OXIMETER) device by Apply Externally route 2 (two) times a day. Use twice daily at 8 AM and 3 PM and [facility] the [facility] as directed. 1 each 0     SPIKEVAX 1011-7597,12Y UP,,PF, 50 mcg/0.5 mL injection        tiotropium bromide (SPIRIVA RESPIMAT) 1.25 mcg/actuation inhaler Inhale 2 puffs into the lungs once daily. Controller. Use this inhaler every day. 4 g 11        Review of patient's allergies indicates:   Allergen Reactions    Ace inhibitors Swelling    Verapamil Other (See Comments)     Other reaction(s): Unknown    Povidone-iodine Itching       Past Medical History:   Diagnosis Date    Atrial fibrillation     CAD (coronary artery disease) 2006    MI in 2006    CHF (congestive heart failure) 2017    CKD (chronic kidney disease) stage 3, GFR 30-59 ml/min     Dr. Latif.  in transplanted kidney    COVID-19 2/7/2023    Diverticulosis     Hyperlipidemia     Hypertension     Keloid cicatrix     Metabolic bone disease     Other  "emphysema 10/1/2019    Pericarditis     S/P kidney transplant 1992    x2 (1992 & 2005),    Thrombocytopenia     Thyroid disease     Tobacco use 09/05/2014     Past Surgical History:   Procedure Laterality Date    CARDIOVERSION  04/30/15    CHOLECYSTECTOMY      COLONOSCOPY  April 20, 2011    Diverticulosis, repeat recommended in 3 yrs., repeat colonoscopy 2014 revealed 2 polyps.  He should return in 5 years.    COLONOSCOPY N/A 3/13/2020    Procedure: COLONOSCOPY;  Surgeon: Chon Casper MD;  Location: [facility] (4TH FLR);  Service: Endoscopy;  Laterality: N/A;  ok to hold coumadin x5days-see telephone encounter 2/4/20-tb    COLONOSCOPY N/A 2/4/2021    Procedure: COLONOSCOPY;  Surgeon: Chon Casper MD;  Location: [facility] (4TH FLR);  Service: Endoscopy;  Laterality: N/A;  Eliquis - per Dr. GAVIN Blunt ok to hold x 2 days and "restarted asap"- ERW  last prep "poor", [id number] ordered/ Pt requests Dr. Casper  prep ins. mailed - COVID screening on 2/1/21 PCW- ERW    COLONOSCOPY N/A 3/3/2021    Procedure: COLONOSCOPY;  Surgeon: Chon Csaper MD;  Location: [facility] (4TH FLR);  Service: Endoscopy;  Laterality: N/A;  Patient with his second poor bowel pre and has poor prep today.  If patient not intersted or can't get colonoscopy tomorrow will need constipation bowel prep and will need to restart his Eliquis today.Thanks,Chon     per Dr Blunt-ok to hold Eliquis 2 days prior      COVID     CORONARY ANGIOPLASTY WITH STENT PLACEMENT  9/13/2006    IRRIGATION AND DEBRIDEMENT Right 8/30/2023    Procedure: IRRIGATION AND DEBRIDEMENT, RIGHT MIDDLE FINGER;  Surgeon: Martin Larsen MD;  Location: [facility] OR [facility] FLR;  Service: Orthopedics;  Laterality: Right;    KIDNEY TRANSPLANT  2005    PARATHYROIDECTOMY      TREATMENT OF CARDIAC ARRHYTHMIA N/A 3/28/2022    Procedure: CARDIOVERSION;  Surgeon: Migue Blunt MD;  Location: [facility] EP LAB;  Service: Cardiology;  Laterality: N/A;  AF, DCC, MAC, GP, 3 PREP*RODRIGO deferred " "pt 100% compliant*     Tobacco Use    Smoking status: Every Day     Current packs/day: 0.00     Average packs/day: 0.5 packs/day for 40.0 years (20.0 ttl pk-yrs)     Types: Cigarettes     Start date: 1982     Last attempt to quit: 2022     Years since quittin.8    Smokeless tobacco: Never    Tobacco comments:     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     Patient is currently retired   Substance and Sexual Activity    Alcohol use: No    Drug use: No    Sexual activity: Yes     Partners: Female       Objective:     Vital Signs (Most Recent):    Vital Signs (24h Range):           There is no height or weight on file to calculate BMI.        Significant Labs:  All pertinent labs from the last 24 hours have been reviewed.    CBC: No results for input(s): "WBC", "RBC", "HGB", "HCT", "PLT", "MCV", "MCH", "MCHC" in the last 72 hours.    CMP: No results for input(s): "NA", "K", "CL", "CO2", "BUN", "CREATININE", "GLU", "MG", "PHOS", "CALCIUM", "ALBUMIN", "PROT", "ALKPHOS", "ALT", "AST", "BILITOT" in the last 72 hours.    INR  No results for input(s): "PT", "INR", "PROTIME", "APTT" in the last 72 hours.      Pre-op Assessment    I have reviewed the Patient Summary Reports.     I have reviewed the Nursing Notes. I have reviewed the NPO Status.   I have reviewed the Medications.     Review of Systems  Anesthesia Hx:             Denies Family Hx of Anesthesia complications.    Denies Personal Hx of Anesthesia complications.                    Cardiovascular:     Hypertension  Past MI CAD      Angina CHF          Cardiovascular Symptoms: Angina   Shortness of Breath    Coronary Artery Disease:          Hx of Myocardial Infarction     Congestive Heart Failure (CHF)                Hypertension     Atrial Fibrillation     Pulmonary:   COPD Asthma  Shortness of breath     Asthma:   Chronic Obstructive Pulmonary Disease (COPD):                      Renal/:  Chronic Renal Disease  "       Kidney Function/Disease             Neurological:    Neuromuscular Disease,                                 Neuromuscular Disease       Physical Exam  General: Well nourished    Airway:  Mallampati: II   Mouth Opening: Normal  TM Distance: Normal  Tongue: Normal  Neck ROM: Normal ROM    Dental:  Any loose and/or missing teeth verified with patient   Chest/Lungs:  Clear to auscultation    Heart:  Rate: Normal  Rhythm: Regular Rhythm  Sounds: Normal    Abdomen:  Normal        Anesthesia Plan  Type of Anesthesia, risks & benefits discussed:    Anesthesia Type: Gen ETT, Gen Supraglottic Airway, Gen Natural Airway, MAC, Regional  Intra-op Monitoring Plan: Standard ASA Monitors  Post Op Pain Control Plan: multimodal analgesia and IV/PO Opioids PRN  Induction:  IV  Informed Consent: Informed consent signed with the Patient and all parties understand the risks and agree with anesthesia plan.  All questions answered.   ASA Score: 3    Ready For Surgery From Anesthesia Perspective.     .

## 2023-12-18 NOTE — BRIEF OP NOTE
[facility] - Surgery ([facility])  Brief Operative Note    Surgery Date: 12/18/2023     Surgeon(s) and Role:     * JUANI Melendez III, MD - Primary     * Martin Sky MD - Resident - Assisting     * Juan Vega MD - Resident - Assisting    Pre-op Diagnosis:  CKD (chronic kidney disease), stage V [N18.5]    Post-op Diagnosis:  Post-Op Diagnosis Codes:     * CKD (chronic kidney disease), stage V [N18.5]    Procedure(s) (LRB):  CREATION, AV FISTULA (Left) brachio-cephalic    Anesthesia: Regional    Operative Findings: Large brachial 6 mm;   cephalic at anast 3mm;    Some pulsatility to the mid upper arm, abrupt transition to pure thrill    Will likely require a fistulagram in 4-6 weeks    Estimated Blood Loss: 20cc         Specimens:   Specimen (24h ago, onward)      None              Discharge Note    OUTCOME: successful outpatient procedure     DISPOSITION: home    FINAL DIAGNOSIS:  CKD 5    FOLLOWUP: 6 weeks with quatitative AVF duplex    DISCHARGE INSTRUCTIONS:  see below

## 2023-12-19 NOTE — ANESTHESIA POSTPROCEDURE EVALUATION
Anesthesia Post Evaluation    Patient: Ibrahima Phelps    Procedure(s) Performed: Procedure(s) (LRB):  CREATION, AV FISTULA (Left)    Final Anesthesia Type: general      Patient location during evaluation: PACU  Patient participation: Yes- Able to Participate  Level of consciousness: awake and alert  Post-procedure vital signs: reviewed and stable  Pain management: adequate  Airway patency: patent    PONV status at discharge: No PONV  Anesthetic complications: no      Cardiovascular status: stable  Respiratory status: spontaneous ventilation and face mask  Hydration status: euvolemic  Follow-up not needed.              Vitals Value Taken Time   /63 12/18/23 1201   Temp 36.6 °C (97.9 °F) 12/18/23 1200   Pulse 56 12/18/23 1214   Resp 29 12/18/23 1213   SpO2 99 % 12/18/23 1214   Vitals shown include unvalidated device data.      No case tracking events are documented in the log.      Pain/Stephane Score: Stephane Score: 10 (12/18/2023 11:15 AM)

## 2023-12-20 ENCOUNTER — LAB VISIT (OUTPATIENT)
Dept: LAB | Facility: HOSPITAL | Age: 68
End: 2023-12-20
Attending: INTERNAL MEDICINE
Payer: MEDICARE

## 2023-12-20 DIAGNOSIS — Z94.0 H/O KIDNEY TRANSPLANT: Chronic | ICD-10-CM

## 2023-12-20 DIAGNOSIS — N18.5 CKD (CHRONIC KIDNEY DISEASE) STAGE 5, GFR LESS THAN 15 ML/MIN: ICD-10-CM

## 2023-12-20 LAB
ALBUMIN SERPL BCP-MCNC: 3 G/DL (ref 3.5–5.2)
ANION GAP SERPL CALC-SCNC: 13 MMOL/L (ref 8–16)
BASOPHILS # BLD AUTO: 0.01 K/UL (ref 0–0.2)
BASOPHILS NFR BLD: 0.2 % (ref 0–1.9)
BUN SERPL-MCNC: 51 MG/DL (ref 8–23)
CALCIUM SERPL-MCNC: 9 MG/DL (ref 8.7–10.5)
CHLORIDE SERPL-SCNC: 110 MMOL/L (ref 95–110)
CO2 SERPL-SCNC: 21 MMOL/L (ref 23–29)
CREAT SERPL-MCNC: 6.7 MG/DL (ref 0.5–1.4)
DIFFERENTIAL METHOD: ABNORMAL
EOSINOPHIL # BLD AUTO: 0.4 K/UL (ref 0–0.5)
EOSINOPHIL NFR BLD: 6.5 % (ref 0–8)
ERYTHROCYTE [DISTWIDTH] IN BLOOD BY AUTOMATED COUNT: 19.1 % (ref 11.5–14.5)
EST. GFR  (NO RACE VARIABLE): 8.3 ML/MIN/1.73 M^2
GLUCOSE SERPL-MCNC: 86 MG/DL (ref 70–110)
HCT VFR BLD AUTO: 26 % (ref 40–54)
HGB BLD-MCNC: 8.1 G/DL (ref 14–18)
IMM GRANULOCYTES # BLD AUTO: 0.03 K/UL (ref 0–0.04)
IMM GRANULOCYTES NFR BLD AUTO: 0.5 % (ref 0–0.5)
LYMPHOCYTES # BLD AUTO: 1.6 K/UL (ref 1–4.8)
LYMPHOCYTES NFR BLD: 26.2 % (ref 18–48)
MCH RBC QN AUTO: 25 PG (ref 27–31)
MCHC RBC AUTO-ENTMCNC: 31.2 G/DL (ref 32–36)
MCV RBC AUTO: 80 FL (ref 82–98)
MONOCYTES # BLD AUTO: 0.5 K/UL (ref 0.3–1)
MONOCYTES NFR BLD: 8.7 % (ref 4–15)
NEUTROPHILS # BLD AUTO: 3.5 K/UL (ref 1.8–7.7)
NEUTROPHILS NFR BLD: 57.9 % (ref 38–73)
NRBC BLD-RTO: 0 /100 WBC
PHOSPHATE SERPL-MCNC: 4.9 MG/DL (ref 2.7–4.5)
PLATELET # BLD AUTO: 111 K/UL (ref 150–450)
PMV BLD AUTO: ABNORMAL FL (ref 9.2–12.9)
POTASSIUM SERPL-SCNC: 4.6 MMOL/L (ref 3.5–5.1)
PTH-INTACT SERPL-MCNC: 7.6 PG/ML (ref 9–77)
RBC # BLD AUTO: 3.24 M/UL (ref 4.6–6.2)
SODIUM SERPL-SCNC: 144 MMOL/L (ref 136–145)
WBC # BLD AUTO: 5.99 K/UL (ref 3.9–12.7)

## 2023-12-20 PROCEDURE — 80069 RENAL FUNCTION PANEL: CPT | Performed by: INTERNAL MEDICINE

## 2023-12-20 PROCEDURE — 83970 ASSAY OF PARATHORMONE: CPT | Performed by: INTERNAL MEDICINE

## 2023-12-20 PROCEDURE — 36415 COLL VENOUS BLD VENIPUNCTURE: CPT | Performed by: INTERNAL MEDICINE

## 2023-12-20 PROCEDURE — 85025 COMPLETE CBC W/AUTO DIFF WBC: CPT | Performed by: INTERNAL MEDICINE

## 2023-12-21 ENCOUNTER — OFFICE VISIT (OUTPATIENT)
Dept: HEMATOLOGY/ONCOLOGY | Facility: CLINIC | Age: 68
End: 2023-12-21
Payer: MEDICARE

## 2023-12-21 VITALS
HEIGHT: 73 IN | DIASTOLIC BLOOD PRESSURE: 60 MMHG | TEMPERATURE: 98 F | OXYGEN SATURATION: 97 % | BODY MASS INDEX: 26.94 KG/M2 | SYSTOLIC BLOOD PRESSURE: 125 MMHG | WEIGHT: 203.25 LBS | RESPIRATION RATE: 18 BRPM | HEART RATE: 51 BPM

## 2023-12-21 DIAGNOSIS — D63.1 ANEMIA IN STAGE 5 CHRONIC KIDNEY DISEASE, NOT ON CHRONIC DIALYSIS: ICD-10-CM

## 2023-12-21 DIAGNOSIS — N18.5 ANEMIA IN STAGE 5 CHRONIC KIDNEY DISEASE, NOT ON CHRONIC DIALYSIS: ICD-10-CM

## 2023-12-21 DIAGNOSIS — N18.4 CKD (CHRONIC KIDNEY DISEASE), STAGE IV: ICD-10-CM

## 2023-12-21 DIAGNOSIS — D69.6 THROMBOCYTOPENIA: Primary | ICD-10-CM

## 2023-12-21 PROCEDURE — 1159F PR MEDICATION LIST DOCUMENTED IN MEDICAL RECORD: ICD-10-PCS | Mod: CPTII,S$GLB,, | Performed by: INTERNAL MEDICINE

## 2023-12-21 PROCEDURE — 3008F PR BODY MASS INDEX (BMI) DOCUMENTED: ICD-10-PCS | Mod: CPTII,S$GLB,, | Performed by: INTERNAL MEDICINE

## 2023-12-21 PROCEDURE — 1111F PR DISCHARGE MEDS RECONCILED W/ CURRENT OUTPATIENT MED LIST: ICD-10-PCS | Mod: CPTII,S$GLB,, | Performed by: INTERNAL MEDICINE

## 2023-12-21 PROCEDURE — 99999 PR PBB SHADOW E&M-EST. PATIENT-LVL V: ICD-10-PCS | Mod: PBBFAC,,, | Performed by: INTERNAL MEDICINE

## 2023-12-21 PROCEDURE — 3062F PR POS MACROALBUMINURIA RESULT DOCUMENTED/REVIEW: ICD-10-PCS | Mod: CPTII,S$GLB,, | Performed by: INTERNAL MEDICINE

## 2023-12-21 PROCEDURE — 3288F FALL RISK ASSESSMENT DOCD: CPT | Mod: CPTII,S$GLB,, | Performed by: INTERNAL MEDICINE

## 2023-12-21 PROCEDURE — 1101F PT FALLS ASSESS-DOCD LE1/YR: CPT | Mod: CPTII,S$GLB,, | Performed by: INTERNAL MEDICINE

## 2023-12-21 PROCEDURE — 3078F PR MOST RECENT DIASTOLIC BLOOD PRESSURE < 80 MM HG: ICD-10-PCS | Mod: CPTII,S$GLB,, | Performed by: INTERNAL MEDICINE

## 2023-12-21 PROCEDURE — 3008F BODY MASS INDEX DOCD: CPT | Mod: CPTII,S$GLB,, | Performed by: INTERNAL MEDICINE

## 2023-12-21 PROCEDURE — 3288F PR FALLS RISK ASSESSMENT DOCUMENTED: ICD-10-PCS | Mod: CPTII,S$GLB,, | Performed by: INTERNAL MEDICINE

## 2023-12-21 PROCEDURE — 3074F SYST BP LT 130 MM HG: CPT | Mod: CPTII,S$GLB,, | Performed by: INTERNAL MEDICINE

## 2023-12-21 PROCEDURE — 3074F PR MOST RECENT SYSTOLIC BLOOD PRESSURE < 130 MM HG: ICD-10-PCS | Mod: CPTII,S$GLB,, | Performed by: INTERNAL MEDICINE

## 2023-12-21 PROCEDURE — 3062F POS MACROALBUMINURIA REV: CPT | Mod: CPTII,S$GLB,, | Performed by: INTERNAL MEDICINE

## 2023-12-21 PROCEDURE — 3078F DIAST BP <80 MM HG: CPT | Mod: CPTII,S$GLB,, | Performed by: INTERNAL MEDICINE

## 2023-12-21 PROCEDURE — 3066F PR DOCUMENTATION OF TREATMENT FOR NEPHROPATHY: ICD-10-PCS | Mod: CPTII,S$GLB,, | Performed by: INTERNAL MEDICINE

## 2023-12-21 PROCEDURE — 99204 OFFICE O/P NEW MOD 45 MIN: CPT | Mod: S$GLB,,, | Performed by: INTERNAL MEDICINE

## 2023-12-21 PROCEDURE — 1126F AMNT PAIN NOTED NONE PRSNT: CPT | Mod: CPTII,S$GLB,, | Performed by: INTERNAL MEDICINE

## 2023-12-21 PROCEDURE — 3044F HG A1C LEVEL LT 7.0%: CPT | Mod: CPTII,S$GLB,, | Performed by: INTERNAL MEDICINE

## 2023-12-21 PROCEDURE — 1159F MED LIST DOCD IN RCRD: CPT | Mod: CPTII,S$GLB,, | Performed by: INTERNAL MEDICINE

## 2023-12-21 PROCEDURE — 1126F PR PAIN SEVERITY QUANTIFIED, NO PAIN PRESENT: ICD-10-PCS | Mod: CPTII,S$GLB,, | Performed by: INTERNAL MEDICINE

## 2023-12-21 PROCEDURE — 99999 PR PBB SHADOW E&M-EST. PATIENT-LVL V: CPT | Mod: PBBFAC,,, | Performed by: INTERNAL MEDICINE

## 2023-12-21 PROCEDURE — 1111F DSCHRG MED/CURRENT MED MERGE: CPT | Mod: CPTII,S$GLB,, | Performed by: INTERNAL MEDICINE

## 2023-12-21 PROCEDURE — 1101F PR PT FALLS ASSESS DOC 0-1 FALLS W/OUT INJ PAST YR: ICD-10-PCS | Mod: CPTII,S$GLB,, | Performed by: INTERNAL MEDICINE

## 2023-12-21 PROCEDURE — 99204 PR OFFICE/OUTPT VISIT, NEW, LEVL IV, 45-59 MIN: ICD-10-PCS | Mod: S$GLB,,, | Performed by: INTERNAL MEDICINE

## 2023-12-21 PROCEDURE — 3044F PR MOST RECENT HEMOGLOBIN A1C LEVEL <7.0%: ICD-10-PCS | Mod: CPTII,S$GLB,, | Performed by: INTERNAL MEDICINE

## 2023-12-21 PROCEDURE — 3066F NEPHROPATHY DOC TX: CPT | Mod: CPTII,S$GLB,, | Performed by: INTERNAL MEDICINE

## 2023-12-21 NOTE — PROGRESS NOTES
Benign Hematology Clinic at [facility]     Chief Complaint:   Encounter Diagnosis   Name Primary?    Anemia in stage 5 chronic kidney disease, not on chronic dialysis            HPI:  Ibrahima Phelps is a 68 y.o. male who presents today for evaluation of anemia.     Hematology History  Patietn with a history of anemia and CKD with history of transplant  He recently had AVF placed on Monday in preparation for HD. 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     Patient is currently retired   Substance Use Topics    Alcohol use: No    Drug use: No     Family History   Problem Relation Age of Onset    No Known Problems Sister     No Known Problems Brother     Thyroid disease Mother         s/p surgery    Heart disease Father         had pacemaker    No Known Problems Sister     Kidney failure Sister     Kidney disease Sister     No Known Problems Sister     Kidney disease Brother     Kidney failure Brother         s/p transplant    Diabetes Mellitus Neg Hx     Stroke Neg Hx     Heart attack Neg Hx     Cancer Neg Hx     Celiac disease Neg Hx     Cirrhosis Neg Hx     Colon cancer Neg Hx     Esophageal cancer Neg Hx     Inflammatory bowel disease Neg Hx     Rectal cancer Neg Hx     Stomach cancer Neg Hx     Ulcerative colitis Neg Hx     Liver disease Neg Hx     Liver cancer Neg Hx     Crohn's disease Neg Hx     Melanoma Neg Hx      Past Medical History:  "  Diagnosis Date    Atrial fibrillation     CAD (coronary artery disease) 2006    MI in 2006    CHF (congestive heart failure) 2017    CKD (chronic kidney disease) stage 3, GFR 30-59 ml/min     Dr. Latif.  in transplanted kidney    COVID-19 2/7/2023    Diverticulosis     Hyperlipidemia     Hypertension     Keloid cicatrix     Metabolic bone disease     Other emphysema 10/1/2019    Pericarditis     S/P kidney transplant 1992    x2 (1992 & 2005),    Thrombocytopenia     Thyroid disease     Tobacco use 09/05/2014     Past Surgical History:   Procedure Laterality Date    AV FISTULA PLACEMENT Left 12/18/2023    Procedure: CREATION, AV FISTULA;  Surgeon: UJANI Melendez III, MD;  Location: [facility] OR 2ND FLR;  Service: Vascular;  Laterality: Left;  LUE AVF creation vs AVG insertion    CARDIOVERSION  04/30/15    CHOLECYSTECTOMY      COLONOSCOPY  April 20, 2011    Diverticulosis, repeat recommended in 3 yrs., repeat colonoscopy 2014 revealed 2 polyps.  He should return in 5 years.    COLONOSCOPY N/A 3/13/2020    Procedure: COLONOSCOPY;  Surgeon: Chon Casper MD;  Location: [facility] (4TH FLR);  Service: Endoscopy;  Laterality: N/A;  ok to hold coumadin x5days-see telephone encounter 2/4/20-tb    COLONOSCOPY N/A 2/4/2021    Procedure: COLONOSCOPY;  Surgeon: Chon Casper MD;  Location: [facility] (4TH FLR);  Service: Endoscopy;  Laterality: N/A;  Eliquis - per Dr. GAVIN Blunt ok to hold x 2 days and "restarted asap"- ERW  last prep "poor", [id number] ordered/ Pt requests Dr. Casper  prep ins. mailed - COVID screening on 2/1/21 PCW- ERW    COLONOSCOPY N/A 3/3/2021    Procedure: COLONOSCOPY;  Surgeon: Chon Casper MD;  Location: [facility] (4TH FLR);  Service: Endoscopy;  Laterality: N/A;  Patient with his second poor bowel pre and has poor prep today.  If patient not intersted or can't get colonoscopy tomorrow will need constipation bowel prep and will need to restart his Eliquis today.Thanks,Chon     per Dr Blunt-ok " to hold Eliquis 2 days prior      COVID     CORONARY ANGIOPLASTY WITH STENT PLACEMENT  9/13/2006    IRRIGATION AND DEBRIDEMENT Right 8/30/2023    Procedure: IRRIGATION AND DEBRIDEMENT, RIGHT MIDDLE FINGER;  Surgeon: Martin Larsen MD;  Location: [facility] OR [facility] FLR;  Service: Orthopedics;  Laterality: Right;    KIDNEY TRANSPLANT  2005    PARATHYROIDECTOMY      TREATMENT OF CARDIAC ARRHYTHMIA N/A 3/28/2022    Procedure: CARDIOVERSION;  Surgeon: Migue Blunt MD;  Location: [facility] EP LAB;  Service: Cardiology;  Laterality: N/A;  AF, DCC, MAC, GP, 3 PREP*RODRIGO deferred pt 100% compliant*       Patient Active Problem List   Diagnosis    Primary hypertension    CAD (coronary artery disease)    Mixed hyperlipidemia    Thrombocytopenia    Carotid artery bruit    Secondary renal hyperparathyroidism    History of adenomatous polyp of colon    Calcific tendinitis of left shoulder    Left rotator cuff tear    Obesity (BMI 30-39.9)    Impingement syndrome of left shoulder    SOB (shortness of breath)    Long term (current) use of anticoagulants [V58.61]    H/O kidney transplant    Hypertensive heart and kidney disease    Paroxysmal atrial fibrillation    Chronic obstructive pulmonary disease with acute exacerbation    Multiple lung nodules on CT    Colon adenoma    Colon polyps    Carotid artery disease    Kidney replaced by transplant    Postural tremor    Polyneuropathy    Former smoker    Snoring    Aortic atherosclerosis    Memory changes    Prophylactic immunotherapy    Pulmonary HTN    Palliative care encounter    Advance care planning    Goals of care, counseling/discussion    Immunodeficiency due to treatment with immunosuppressive medication    Acute on chronic heart failure with preserved ejection fraction    Anemia in stage 5 chronic kidney disease, not on chronic dialysis    Pyogenic arthritis of right hand    Hiccups    Benign essential tremor    Glucose intolerance    CKD (chronic kidney disease), stage IV     Consolidation lung    Unstable angina pectoris    Chest pain    Advanced care planning/counseling discussion    Immunosuppression    CKD (chronic kidney disease) stage 5, GFR less than 15 ml/min    Pre-op evaluation    Immunosuppressive management encounter following kidney transplant    NSTEMI (non-ST elevated myocardial infarction)    Chronic bronchitis       Current Outpatient Medications   Medication Instructions    acetaminophen (TYLENOL) 500 mg, Oral, Every 6 hours PRN    albuterol (VENTOLIN HFA) 90 mcg/actuation inhaler 2 puffs, Inhalation, Every 6 hours PRN, Rescue    albuterol-ipratropium (DUO-NEB) 2.5 mg-0.5 mg/3 mL nebulizer solution 3 mLs, Nebulization, Every 6 hours PRN, Rescue. Can be used in place of albuterol inhaler.    amiodarone (PACERONE) 200 mg, Oral, Daily    apixaban (ELIQUIS) 5 mg, Oral, 2 times daily    atorvastatin (LIPITOR) 80 mg, Oral, Daily    b complex vitamins (B COMPLEX-VITAMIN B12) tablet 1 tablet, Oral, Daily    calcium acetate,phosphat bind, (PHOSLO) 667 mg capsule TAKE 1 CAPSULE BY MOUTH THREE TIMES A DAY WITH MEALS AND SNACKS    calcium carbonate (CALCIUM 600 ORAL) 1 tablet, Oral, 2 times daily    CHOLECALCIFEROL, VITAMIN D3, ORAL 2 tablets, Oral, 2 times daily    clopidogreL (PLAVIX) 75 mg, Oral, Daily    doxazosin (CARDURA) 4 mg, Oral, Every 12 hours    famotidine (PEPCID) 20 mg, Oral, 2 times daily    fexofenadine HCl (ALLEGRA ORAL) 1 tablet, Oral, Daily PRN    fluticasone furoate-vilanteroL (BREO) 100-25 mcg/dose diskus inhaler 1 puff, Inhalation, Daily, Controller. Use this inhaler every day.    fluticasone propionate (FLONASE) 50 mcg, Each Nostril, Daily PRN    FLUZONE HIGHDOSE QUAD 23-24  mcg/0.7 mL Syrg No dose, route, or frequency recorded.    furosemide (LASIX) 20 MG tablet One po QAM, and one po at lunchtime if swelling prn    furosemide (LASIX) 80 mg, Oral, Every morning    gabapentin (NEURONTIN) 100 mg, Oral, As needed (PRN)    hydrALAZINE (APRESOLINE) 50 mg,  "Oral, 3 times daily    HYDROcodone-acetaminophen (NORCO) 5-325 mg per tablet 1 tablet, Oral, Every 6 hours PRN    isosorbide mononitrate (IMDUR) 120 mg, Oral, Daily    metoprolol succinate (TOPROL-XL) 12.5 mg, Oral, 2 times daily    MITIGARE 0.6 mg Cap TAKE 1 CAPSULE BY MOUTH TWICE A DAY AS NEEDED GOUT FLARE UP TO 3 DAYS    multivitamin (THERAGRAN) per tablet 1 tablet, Daily    NIFEdipine (PROCARDIA-XL) 60 mg, Oral, 2 times daily    nitroGLYCERIN (NITROSTAT) 0.4 mg, Sublingual, Every 5 min PRN    omeprazole (PRILOSEC) 20 mg, Oral, Daily PRN    paricalcitoL (ZEMPLAR) 1 MCG capsule TAKE 1 CAPSULE ON MONDAY, WEDNESDAY AND FRIDAY    PFIZER COVID BIVAL,12Y UP,,PF, 30 mcg/0.3 mL injection Intramuscular    potassium chloride (KLOR-CON) 10 MEQ TbSR Take 1 tablet (10 mEq total) by mouth daily as needed (to be taken with lasix).,    predniSONE (DELTASONE) 5 MG tablet TAKE 1 TABLET BY MOUTH EVERY DAY IN THE MORNING    pulse oximeter (PULSE OXIMETER) device Apply Externally, 2 times daily, Use twice daily at 8 AM and 3 PM and record the value in [facility] as directed.    SPIKEVAX 4735-6917,12Y UP,,PF, 50 mcg/0.5 mL injection     tacrolimus (PROGRAF) 3 mg, Oral, Every 12 hours    tiotropium bromide (SPIRIVA RESPIMAT) 1.25 mcg/actuation inhaler 2 puffs, Inhalation, Daily, Controller. Use this inhaler every day.       Review of Systems:   Review of Systems   Respiratory:  Positive for shortness of breath. Negative for cough.    Cardiovascular:  Negative for chest pain.   Gastrointestinal:  Negative for abdominal pain and diarrhea.   Genitourinary:  Negative for frequency.   Musculoskeletal:  Negative for back pain.   Skin:  Negative for rash.   Neurological:  Negative for headaches.   Psychiatric/Behavioral:  The patient is not nervous/anxious.    All other systems reviewed and are negative.      PHYSICAL EXAM:  /60   Pulse (!) 51   Temp 98 °F (36.7 °C) (Oral)   Resp 18   Ht 6' 1" (1.854 m)   Wt 92.2 kg (203 lb 4.2 oz)   " SpO2 97%   BMI 26.82 kg/m²     General Appearance:    Alert, cooperative, no distress, appears stated age   Head:    Normocephalic, without obvious abnormality, atraumatic   Eyes:    PERRL, conjunctiva/corneas clear   Nose:   Nares normal, septum midline   Throat:   Lips, mucosa, and tongue normal; teeth and gums normal   Lungs:     Respirations unlabored   Extremities:   Extremities normal, atraumatic, no cyanosis or edema   Pulses:   2+ and symmetric all extremities   Skin:   Skin color, texture, turgor normal, no rashes or lesions   Neurologic:   CNII-XII intact, normal gait               Pertinent Labs & Imaging:  Recent Labs   Lab Result Units 11/25/23  0502 11/26/23  0443 12/20/23  0814   WBC K/uL 7.52 7.75 5.99   Hemoglobin g/dL 7.3* 7.6* 8.1*   Hematocrit % 23.4* 24.3* 26.0*   Platelets K/uL 139* 173 111*     Recent Labs   Lab Result Units 11/24/23  0419 11/25/23  0502 11/26/23  0443 12/20/23  0814   Creatinine mg/dL 5.9* 6.5* 6.3* 6.7*   AST U/L 22 20 22  --    ALT U/L 17 17 19  --      Recent Labs   Lab Result Units 10/17/23  0856 10/18/23  1204 11/23/23  1447   Iron ug/dL 34*  --  11*   Ferritin ng/mL  --  701* 574*       Assessment & Plan:    1. Anemia in stage 5 chronic kidney disease, not on chronic dialysis  - Ambulatory referral/consult to Hematology / Oncology  - HGB ELECTROPHERESIS - Hemoglobin Electrophoresis,Hgb A2 [place].; Future  - Alpha-Globin Gene Analysis; Future  - EPO LEVEL; Future  - Pathologist Interpretation Differential; Future    Full chart review of past labs and imaging, referring providers' notes, and consultants' reports.   Patient with CKD s/p transplant planning for HD   Review of labs shows high ferritin with low serum iron, transferrin, and percent saturation   This pattern is more often associated with iron sequestration. Studies have shown that ferritin >500 with low % saturation is indicative of inflammation     In regards to microcytosis, will check for thalassemia        Med Onc Chart Routing      Follow up with physician . Will call with results   Follow up with APOORVA    Infusion scheduling note    Injection scheduling note    Labs    Imaging    Pharmacy appointment    Other referrals                    MDM includes  :    - Acute or chronic illness or injury that poses a threat to life or bodily function  - Review of prior external notes from unique source  - Independent review and explanation of 3+ results from unique tests  - Discussion of management and ordering 3 unique tests  - Extensive discussion of management        Ambar Mccullough MD  12/21/2023

## 2023-12-22 ENCOUNTER — PATIENT MESSAGE (OUTPATIENT)
Dept: NEPHROLOGY | Facility: CLINIC | Age: 68
End: 2023-12-22
Payer: MEDICARE

## 2023-12-23 NOTE — OP NOTE
[facility] - Surgery ([facility])  Operative Note     Surgery Date: 12/18/2023      Surgeon(s) and Role:     * JUANI Melendez III, MD - Primary     * Martin kSy MD - Resident - Assisting     * Juan Vega MD - Resident - Assisting     Pre-op Diagnosis:  CKD (chronic kidney disease), stage V [N18.5]     Post-op Diagnosis:  Post-Op Diagnosis Codes:     * CKD (chronic kidney disease), stage V [N18.5]     Procedure(s) (LRB):  CREATION, AV FISTULA (Left) brachio-cephalic     Anesthesia: Regional     Operative Findings: Large brachial 6 mm;   cephalic at anast 3mm;     Some pulsatility to the mid upper arm, abrupt transition to pure thrill     Will likely require a fistulagram in 4-6 weeks     Estimated Blood Loss: 20cc         Specimens:   Specimen (24h ago, onward)        Procedure Details:  The patient was seen in the Holding Room. The risks, benefits, complications, treatment options, and expected outcomes were discussed with the patient. The patient concurred with the proposed plan, giving informed consent.  The site of surgery properly noted/marked. A Time Out was held and the above information confirmed.    The patient was brought to the Operating Room. The left arm was prepped and draped in the usual sterile fashion. Antibiotics were administered. A transverse incision was made just  distal to the antecubital crease. The cephalic vein was identified crossing obliquely. It was dissected proximally and distally, it was noted to be 3-4 mm in diameter. The brachial artery was then dissected, noted to be 6 mm in diameter and free of disease. Proximal and distal control were obtained by placing vessel loops around the artery.  The patient was systemically heparinized with 5 units, the vessel loops were tightened, and an arteriotomy was made with 11 blade scalpel and extended with [place] scissors. A stay suture was placed medially. An end-to-side anastomosis between the median cubital vein and the  brachial artery was created using 6-0 Prolene sutures in a continuous running manner. After completion of the anastomosis, the vessel loops were released. Hemostasis was secured. No protamine was given. Some pulsatility to the mid upper arm, abrupt transition to pure thrill was noted in the fistula and  palpable radial pulse was present. The incision was then closed in two layers, subcutaneous tissue with 3-0 Vicryl and skin with 4-0 Monocryl.     Instrument and sponge counts were correct x2.    The patient tolerated the procedure and was transported to the the recovery room in good condition.     Dr. Melendez was present for critical portions of the procedure.     Juan Vega  Vascular Surgery Fellow, PGY-6  [phone]

## 2023-12-23 NOTE — ASSESSMENT & PLAN NOTE
Echo    Interpretation Summary    Left Ventricle: The left ventricle is normal in size. Ventricular mass is normal. Normal wall thickness. Normal wall motion. There is normal systolic function with a visually estimated ejection fraction of 60 - 65%. Grade II diastolic dysfunction.    Left Atrium: Left atrium is severely dilated.    Right Ventricle: Normal right ventricular cavity size. Wall thickness is normal. Right ventricle wall motion  is normal. Systolic function is normal.    Right Atrium: Right atrium is dilated.    Aortic Valve: Moderately calcified cusps. There is moderate annular calcification present. Mildly restricted motion. There is mild stenosis. Aortic valve area by VTI is 1.85 cm². Aortic valve peak velocity is 2.12 m/s. Mean gradient is 9 mmHg. The dimensionless index is 0.49. There is mild to moderate aortic regurgitation.    Pulmonary Artery: The estimated pulmonary artery systolic pressure is 59 mmHg.    IVC/SVC: Normal venous pressure at 3 mmHg.    Patient is euvolemic, no JVD, lung exam clear to auscultation, 1+ pitting edema bilateral lower extremities.    Plan:   - Ok to hold lasix 2/2 to elevated creatinine and per KTM.   - optimize blood pressure management/GDMT   - Continue metoprolol 25 mg, nifedipine 60 mg, hydralazine 50 mg t.i.d.   Never

## 2023-12-25 NOTE — PROGRESS NOTES
C3 nurse attempted to contact Ibrahima Phelps for a TCC post hospital discharge follow up call. The patient is unable to conduct the call @ this time. The patient requested a callback this afternoon.    The patient has a scheduled HOSFU with Art Churchill II, MD on 10/27/23 at 0920. No messages routed at this time.    Patient with one or more new problems requiring additional work-up/treatment.

## 2023-12-27 ENCOUNTER — PATIENT MESSAGE (OUTPATIENT)
Dept: INTERNAL MEDICINE | Facility: CLINIC | Age: 68
End: 2023-12-27
Payer: MEDICARE

## 2023-12-27 NOTE — PHYSICIAN QUERY
PT Name: Ibrahima Phelps  MR #: [id number]     DOCUMENTATION CLARIFICATION     CDS/: Isabella Medrano RN            Contact information:[email or web address]  This form is a permanent document in the medical record.    Query Date: December 27, 2023    By submitting this query, we are merely seeking further clarification of documentation.  Please utilize your independent clinical judgment when addressing the question(s) below.    The Medical Record contains the following:   Indicator Supporting Clinical Findings Location in Medical Record    Kidney (Renal) Insufficiency     x Kidney (Renal) Failure/Injury KATINA on CKD V  Discharge summary    Nephrotoxic Agents     x BUN/Creatinine           GFR Bun=41--50--54--55  Creatinine=5.6--5.9--6.5--6.3  GFR=10.3--9.7--8.6--8.9    Cr 5.6 (baseline) Lab 11-24 yo 11-26        H&P    Urine: Casts         Eosinophils      Urine Output      Dehydration      Nausea/Vomiting      Dialysis/CRRT      Treatment     x Other h/o kidney TXP now with ESRD    CKD (chronic kidney disease) stage 5, GFR less than 15 ml/min    ckd stage 5 predialysis    remains with advanced but stable CKD stage V. No emergent need for HD at this time. Set up for AVF surgery next week. ED MD    H&P      Transplant kidney note 11-23    Transplant kidney note 11-24           [facility] approved diagnostic criteria for acute kidney injury is based on KDIGO criteria:    An increase in serum creatinine > 0.3mg/dl within 48 hours  OR  Increase in serum creatinine to > 1.5x baseline, which is known or presumed to have occurred within the prior 7 days  OR  Urine volume <0.5 ml/kg/hr for 6 hours       The clinical guidelines noted above are only a system guideline. It does not replace the providers clinical judgment.     Provider, please clarify the conflicting renal diagnosis or diagnoses associated with above clinical findings.     [    ] Unspecified Acute Kidney Failure/Injury     [   xx ] Chronic  Kidney Disease (CKD) stage 5 - Kidney failure eGFR <15    [    ] Chronic Kidney Disease (CKD) stage 5 on chronic dialysis   [    ] End Stage Renal Disease (ESRD) - Kidney failure, requiring renal replacement therapy or transplant, eGFR <15 (for 3 or more months)   [    ] Other (please specify): _______________________________       Please document in your progress notes daily for the duration of treatment until resolved and include in your discharge summary.    References:   [facility] Clinical Practice Guideline for Acute Kidney Injury. (2012, March). Retrieved October 21, 2020, from https://kdigo.org/wp-content/uploads/2016/10/VTLUN-3409-YCV-Guideline-English.pdf    TRISHA Cintron MD, PRASAD Mcmahon MD, & BE Doll MD. (1960). Renal medullary necrosis [Abstract]. The American Journal of Medicine, 29(1), 132-156. Doi:https://www.sciencedirect.com/science/article/abs/pii/1277912737145631    BE Houser MD, & CAROLINA Moon MD, MS. (2020, June 18). Definition and staging of chronic kidney disease in adults ([id number] PRASAD Calderon MD, ScD & [id number] AUTUMN Robert MD, MSc, Eds.). Retrieved October 21, 2020, from https://www.[employer]/contents/definition-and-staging-of-chronic-kidney-disease-in-adults?search=ckd%20staging&source=search_result&selectedTitle=1~150&usage_type=default&display_rank=1     HAO Cruz MD, FACP. (2015, Tabby 15). Acute kidney injury revisited. Retrieved October 21, 2020, from https://acphospitalist.org/archives/2015/06/coding-acute-kidney-injury.htm    PAMELA Brooks MD. (2019, July). Renal Cortical Necrosis. Retrieved October 21, 2020, from https://www.[employer]/professional/genitourinary-disorders/renovascular-disorders/renal-cortical-necrosis    Form No. 89794

## 2023-12-28 ENCOUNTER — PATIENT MESSAGE (OUTPATIENT)
Dept: HEMATOLOGY/ONCOLOGY | Facility: CLINIC | Age: 68
End: 2023-12-28
Payer: MEDICARE

## 2024-01-08 ENCOUNTER — TELEPHONE (OUTPATIENT)
Dept: VASCULAR SURGERY | Facility: CLINIC | Age: 69
End: 2024-01-08
Payer: MEDICARE

## 2024-01-08 NOTE — TELEPHONE ENCOUNTER
Contacted pt's wife to schedule appt. Appointment scheduled, wife verified.    ----- Message from JUANI Melendez III, MD sent at 12/18/2023 10:39 AM CST -----  F/u 6 weeks with quantitative AVF duplex

## 2024-01-15 PROBLEM — N18.4 TYPE 2 DIABETES MELLITUS WITH STAGE 4 CHRONIC KIDNEY DISEASE, WITHOUT LONG-TERM CURRENT USE OF INSULIN: Status: ACTIVE | Noted: 2024-01-15

## 2024-01-15 PROBLEM — E11.22 TYPE 2 DIABETES MELLITUS WITH STAGE 4 CHRONIC KIDNEY DISEASE, WITHOUT LONG-TERM CURRENT USE OF INSULIN: Status: ACTIVE | Noted: 2024-01-15

## 2024-01-17 DIAGNOSIS — E11.9 TYPE 2 DIABETES MELLITUS WITHOUT COMPLICATION: ICD-10-CM

## 2024-01-19 ENCOUNTER — OUTPATIENT CASE MANAGEMENT (OUTPATIENT)
Dept: ADMINISTRATIVE | Facility: OTHER | Age: 69
End: 2024-01-19
Payer: MEDICARE

## 2024-01-22 ENCOUNTER — PATIENT MESSAGE (OUTPATIENT)
Dept: ADMINISTRATIVE | Facility: HOSPITAL | Age: 69
End: 2024-01-22
Payer: MEDICARE

## 2024-01-23 RX ORDER — TACROLIMUS 1 MG/1
3 CAPSULE ORAL EVERY 12 HOURS
Qty: 540 CAPSULE | Refills: 4 | Status: ON HOLD | OUTPATIENT
Start: 2024-01-23 | End: 2024-03-02

## 2024-01-26 ENCOUNTER — TELEPHONE (OUTPATIENT)
Dept: VASCULAR SURGERY | Facility: CLINIC | Age: 69
End: 2024-01-26
Payer: MEDICARE

## 2024-01-26 NOTE — TELEPHONE ENCOUNTER
Contacted pt's wife to reschedule appt with Dr. Melendez due to change in surgeon's schedule. Appointment rescheduled, pt verified. Appointment letter refused.

## 2024-02-02 ENCOUNTER — OFFICE VISIT (OUTPATIENT)
Dept: INTERNAL MEDICINE | Facility: CLINIC | Age: 69
End: 2024-02-02
Payer: MEDICARE

## 2024-02-02 ENCOUNTER — HOSPITAL ENCOUNTER (OUTPATIENT)
Dept: VASCULAR SURGERY | Facility: CLINIC | Age: 69
Discharge: HOME | End: 2024-02-02
Attending: SURGERY
Payer: MEDICARE

## 2024-02-02 VITALS
OXYGEN SATURATION: 99 % | BODY MASS INDEX: 26.38 KG/M2 | WEIGHT: 199.94 LBS | HEART RATE: 61 BPM | DIASTOLIC BLOOD PRESSURE: 85 MMHG | SYSTOLIC BLOOD PRESSURE: 132 MMHG

## 2024-02-02 DIAGNOSIS — Z01.818 PRE-OP EVALUATION: ICD-10-CM

## 2024-02-02 DIAGNOSIS — N18.6 ESRD (END STAGE RENAL DISEASE): Primary | ICD-10-CM

## 2024-02-02 DIAGNOSIS — N25.81 SECONDARY RENAL HYPERPARATHYROIDISM: ICD-10-CM

## 2024-02-02 DIAGNOSIS — Z12.5 SCREENING PSA (PROSTATE SPECIFIC ANTIGEN): ICD-10-CM

## 2024-02-02 DIAGNOSIS — I50.32 CHRONIC DIASTOLIC HEART FAILURE: ICD-10-CM

## 2024-02-02 DIAGNOSIS — N18.5 CKD (CHRONIC KIDNEY DISEASE) STAGE 5, GFR LESS THAN 15 ML/MIN: ICD-10-CM

## 2024-02-02 DIAGNOSIS — Z12.12 SCREENING FOR COLORECTAL CANCER: ICD-10-CM

## 2024-02-02 DIAGNOSIS — E78.2 MIXED HYPERLIPIDEMIA: ICD-10-CM

## 2024-02-02 DIAGNOSIS — I70.0 AORTIC ATHEROSCLEROSIS: ICD-10-CM

## 2024-02-02 DIAGNOSIS — I77.9 CAROTID ARTERY DISEASE, UNSPECIFIED LATERALITY, UNSPECIFIED TYPE: ICD-10-CM

## 2024-02-02 DIAGNOSIS — R68.83 CHILLS: ICD-10-CM

## 2024-02-02 DIAGNOSIS — G47.9 SLEEPING DIFFICULTY: ICD-10-CM

## 2024-02-02 DIAGNOSIS — Z79.899 IMMUNODEFICIENCY DUE TO TREATMENT WITH IMMUNOSUPPRESSIVE MEDICATION: ICD-10-CM

## 2024-02-02 DIAGNOSIS — D69.6 THROMBOCYTOPENIA: ICD-10-CM

## 2024-02-02 DIAGNOSIS — Z12.11 SCREENING FOR COLORECTAL CANCER: ICD-10-CM

## 2024-02-02 DIAGNOSIS — I48.0 PAROXYSMAL ATRIAL FIBRILLATION: ICD-10-CM

## 2024-02-02 DIAGNOSIS — D84.821 IMMUNODEFICIENCY DUE TO TREATMENT WITH IMMUNOSUPPRESSIVE MEDICATION: ICD-10-CM

## 2024-02-02 DIAGNOSIS — I10 PRIMARY HYPERTENSION: Primary | ICD-10-CM

## 2024-02-02 PROBLEM — E11.22 TYPE 2 DIABETES MELLITUS WITH STAGE 4 CHRONIC KIDNEY DISEASE, WITHOUT LONG-TERM CURRENT USE OF INSULIN: Status: RESOLVED | Noted: 2024-01-15 | Resolved: 2024-02-02

## 2024-02-02 PROBLEM — J42 CHRONIC BRONCHITIS: Status: RESOLVED | Noted: 2023-11-26 | Resolved: 2024-02-02

## 2024-02-02 PROBLEM — N18.4 TYPE 2 DIABETES MELLITUS WITH STAGE 4 CHRONIC KIDNEY DISEASE, WITHOUT LONG-TERM CURRENT USE OF INSULIN: Status: RESOLVED | Noted: 2024-01-15 | Resolved: 2024-02-02

## 2024-02-02 PROBLEM — J44.1 CHRONIC OBSTRUCTIVE PULMONARY DISEASE WITH ACUTE EXACERBATION: Status: RESOLVED | Noted: 2019-10-01 | Resolved: 2024-02-02

## 2024-02-02 PROCEDURE — 99214 OFFICE O/P EST MOD 30 MIN: CPT | Mod: S$GLB,,, | Performed by: STUDENT IN AN ORGANIZED HEALTH CARE EDUCATION/TRAINING PROGRAM

## 2024-02-02 PROCEDURE — 99999 PR PBB SHADOW E&M-EST. PATIENT-LVL IV: CPT | Mod: PBBFAC,,, | Performed by: STUDENT IN AN ORGANIZED HEALTH CARE EDUCATION/TRAINING PROGRAM

## 2024-02-02 PROCEDURE — 3288F FALL RISK ASSESSMENT DOCD: CPT | Mod: CPTII,S$GLB,, | Performed by: STUDENT IN AN ORGANIZED HEALTH CARE EDUCATION/TRAINING PROGRAM

## 2024-02-02 PROCEDURE — 1160F RVW MEDS BY RX/DR IN RCRD: CPT | Mod: CPTII,S$GLB,, | Performed by: STUDENT IN AN ORGANIZED HEALTH CARE EDUCATION/TRAINING PROGRAM

## 2024-02-02 PROCEDURE — 3079F DIAST BP 80-89 MM HG: CPT | Mod: CPTII,S$GLB,, | Performed by: STUDENT IN AN ORGANIZED HEALTH CARE EDUCATION/TRAINING PROGRAM

## 2024-02-02 PROCEDURE — 3075F SYST BP GE 130 - 139MM HG: CPT | Mod: CPTII,S$GLB,, | Performed by: STUDENT IN AN ORGANIZED HEALTH CARE EDUCATION/TRAINING PROGRAM

## 2024-02-02 PROCEDURE — 93990 DOPPLER FLOW TESTING: CPT | Mod: S$GLB,,, | Performed by: SURGERY

## 2024-02-02 PROCEDURE — 3008F BODY MASS INDEX DOCD: CPT | Mod: CPTII,S$GLB,, | Performed by: STUDENT IN AN ORGANIZED HEALTH CARE EDUCATION/TRAINING PROGRAM

## 2024-02-02 PROCEDURE — 1101F PT FALLS ASSESS-DOCD LE1/YR: CPT | Mod: CPTII,S$GLB,, | Performed by: STUDENT IN AN ORGANIZED HEALTH CARE EDUCATION/TRAINING PROGRAM

## 2024-02-02 PROCEDURE — 1159F MED LIST DOCD IN RCRD: CPT | Mod: CPTII,S$GLB,, | Performed by: STUDENT IN AN ORGANIZED HEALTH CARE EDUCATION/TRAINING PROGRAM

## 2024-02-02 RX ORDER — TRAZODONE HYDROCHLORIDE 50 MG/1
50 TABLET ORAL NIGHTLY PRN
Qty: 90 TABLET | Refills: 3 | Status: ON HOLD | OUTPATIENT
Start: 2024-02-02 | End: 2024-03-02 | Stop reason: HOSPADM

## 2024-02-02 NOTE — PATIENT INSTRUCTIONS
Call the endoscopy  to set up your colonoscopy. You are due for colon cancer screening.     You can take the trazodone as needed for sleep.

## 2024-02-02 NOTE — PROGRESS NOTES
SUBJECTIVE     Chief Complaint   Patient presents with    Follow-up       HPI  Ibrahima Phelps is a 68 y.o. male with  ESRD s/p kidney transplant x2 (1992, 2005) on chronic immunosuppressive medication, secondary hyperparathyroidism, metabolic bone disease, CAD s/p PCI (2006), aortic atherosclerosis, HTN, HLD, Chronic diastolic CHF, tachycardia-bradycardia syndrome with periods of paroxysmal atrial fibrillation, thrombocytopenia, prediabetes, hypothyroidism  that presents for follow-up. LOV 9/12/23.     Patient is accompanied to clinic today by his wife.     With CKD 5 s/p renal transplant x2 now planning for HD. S/p AV fistula creation in [facility] on 12/18/23.   Has anemia in CKD5, has been seen by Heme/Onc, note that pattern of iron studies a/w iron sequestration. Patient's high ferritin and low % saturation noted to be indicative of inflammation. Has microcytosis, being evaluated for Thalassemia.     Underwent an aborted cardioversion on 7/17/23 for pAFib. Does have tachycardia-bradycardia syndrome. Maintained on Amiodarone 200 mg po qd, metoprolol succinate 12.5 mg po qd. On Eliquis for cardioembolic protection.     History of prediabetes. Most recent A1c in 100/2023 was 5.0%. Patient on prednisone 5 mg qd for immunosuppression.    Today, patient's main concern is that he frequently feels chilly.  We will have to have the heat in the house on pretty frequently.  Patient denies fever, congestion, sore throat, nausea, vomiting.  Patient otherwise feels well today.    RSV vaccine obtained recently.    Patient overdue for repeat colonoscopy.  Last colonoscopy in 2021 wrong, recommended repeat in 2 years for surveillance biopsy from the sigmoid colon showed colonic mucosa with mild architectural distortion of the glands and lymphoid aggregates, but negative for colitis, granulomas, dysplasia or malignancy.  Patient denies melena, hematochezia, changes in bowel habits today.    HTN -   Currently prescribed nifedipine 60 mg  b.i.d., metoprolol succinate 12.5 mg b.i.d., hydralazine 50 mg t.i.d., doxazosin 4 mg q.12 hours.  Patient endorses taking medication as directed.  Denies side effects or concerns while taking medication.  Patient not currently checking BP at home.  Denies headaches, vision changes, CP, palpitations, or other concerning symptoms.  Lab Results   Component Value Date    MICALBCREAT 1528.0 (H) 05/03/2023     BP Readings from Last 3 Encounters:   02/02/24 132/85   12/21/23 125/60   12/18/23 132/63       Patient having difficulty falling/staying asleep.  Asking for sleep aid.  No other interventions tried at home.    PAST MEDICAL HISTORY:  Past Medical History:   Diagnosis Date    Atrial fibrillation     CAD (coronary artery disease) 2006    MI in 2006    CHF (congestive heart failure) 2017    CKD (chronic kidney disease) stage 3, GFR 30-59 ml/min     Dr. Latif.  in transplanted kidney    COVID-19 2/7/2023    Diverticulosis     Hyperlipidemia     Hypertension     Keloid cicatrix     Metabolic bone disease     Other emphysema 10/1/2019    Pericarditis     S/P kidney transplant 1992    x2 (1992 & 2005),    Thrombocytopenia     Thyroid disease     Tobacco use 09/05/2014       PAST SURGICAL HISTORY:  Past Surgical History:   Procedure Laterality Date    AV FISTULA PLACEMENT Left 12/18/2023    Procedure: CREATION, AV FISTULA;  Surgeon: JUANI Melendez III, MD;  Location: [facility] OR [address] [place] [address];  Service: Vascular;  Laterality: Left;  LUE AVF creation vs AVG insertion    CARDIOVERSION  04/30/15    CHOLECYSTECTOMY      COLONOSCOPY  April 20, 2011    Diverticulosis, repeat recommended in 3 yrs., repeat colonoscopy 2014 revealed 2 polyps.  He should return in 5 years.    COLONOSCOPY N/A 3/13/2020    Procedure: COLONOSCOPY;  Surgeon: Chon Casper MD;  Location: [facility] (4TH FLR);  Service: Endoscopy;  Laterality: N/A;  ok to hold coumadin x5days-see telephone encounter 2/4/20-tb    COLONOSCOPY N/A 2/4/2021    Procedure:  "COLONOSCOPY;  Surgeon: Chon Casper MD;  Location: [facility] (4TH FLR);  Service: Endoscopy;  Laterality: N/A;  Eliquis - per Dr. GAVIN chua to hold x 2 days and "restarted asap"- ERW  last prep "poor", [id number] ordered/ Pt requests Dr. Casper  prep ins. mailed - COVID screening on 2/1/21 PCW- ERW    COLONOSCOPY N/A 3/3/2021    Procedure: COLONOSCOPY;  Surgeon: Chon Casper MD;  Location: [facility] (4TH FLR);  Service: Endoscopy;  Laterality: N/A;  Patient with his second poor bowel pre and has poor prep today.  If patient not intersted or can't get colonoscopy tomorrow will need constipation bowel prep and will need to restart his Eliquis today.Thanks,Chon Blunt-ok to hold Eliquis 2 days prior      COVID     CORONARY ANGIOPLASTY WITH STENT PLACEMENT  9/13/2006    IRRIGATION AND DEBRIDEMENT Right 8/30/2023    Procedure: IRRIGATION AND DEBRIDEMENT, RIGHT MIDDLE FINGER;  Surgeon: Martin Larsen MD;  Location: [address];  Service: Orthopedics;  Laterality: Right;    KIDNEY TRANSPLANT  2005    PARATHYROIDECTOMY      TREATMENT OF CARDIAC ARRHYTHMIA N/A 3/28/2022    Procedure: CARDIOVERSION;  Surgeon: Migue Blunt MD;  Location: [facility] EP LAB;  Service: Cardiology;  Laterality: N/A;  AF, DCC, MAC, GP, 3 PREP*RODRIGO deferred pt 100% compliant*       FAMILY HISTORY:  Family History   Problem Relation Age of Onset    No Known Problems Sister     No Known Problems Brother     Thyroid disease Mother         s/p surgery    Heart disease Father         had pacemaker    No Known Problems Sister     Kidney failure Sister     Kidney disease Sister     No Known Problems Sister     Kidney disease Brother     Kidney failure Brother         s/p transplant    Diabetes Mellitus Neg Hx     Stroke Neg Hx     Heart attack Neg Hx     Cancer Neg Hx     Celiac disease Neg Hx     Cirrhosis Neg Hx     Colon cancer Neg Hx     Esophageal cancer Neg Hx     Inflammatory bowel disease Neg Hx     Rectal cancer Neg " Hx     Stomach cancer Neg Hx     Ulcerative colitis Neg Hx     Liver disease Neg Hx     Liver cancer Neg Hx     Crohn's disease Neg Hx     Melanoma Neg Hx        ALLERGIES AND MEDICATIONS: updated and reviewed.  Review of patient's allergies indicates:   Allergen Reactions    Ace inhibitors Swelling    Verapamil Other (See Comments)     Other reaction(s): Unknown    Povidone-iodine Itching     Current Outpatient Medications   Medication Sig Dispense Refill    acetaminophen (TYLENOL) 500 MG tablet Take 1 tablet (500 mg total) by mouth every 6 (six) hours as needed for Pain. 20 tablet 0    albuterol (VENTOLIN HFA) 90 mcg/actuation inhaler Inhale 2 puffs into the lungs every 6 (six) hours as needed for Wheezing or Shortness of Breath. Rescue 18 g 3    albuterol-ipratropium (DUO-NEB) 2.5 mg-0.5 mg/3 mL nebulizer solution Take 3 mLs by nebulization every 6 (six) hours as needed for Wheezing. Rescue. Can be used in place of albuterol inhaler. 75 mL 0    amiodarone (PACERONE) 200 MG Tab Take 1 tablet (200 mg total) by mouth once daily. 30 tablet 11    apixaban (ELIQUIS) 5 mg Tab Take 1 tablet (5 mg total) by mouth 2 (two) times daily. 180 tablet 3    atorvastatin (LIPITOR) 40 MG tablet Take 2 tablets (80 mg total) by mouth once daily. 180 tablet 3    b complex vitamins (B COMPLEX-VITAMIN B12) tablet Take 1 tablet by mouth once daily. 90 tablet 3    calcium acetate,phosphat bind, (PHOSLO) 667 mg capsule TAKE 1 CAPSULE BY MOUTH THREE TIMES A DAY WITH MEALS AND SNACKS 360 capsule 1    calcium carbonate (CALCIUM 600 ORAL) Take 1 tablet by mouth 2 (two) times a day.      CHOLECALCIFEROL, VITAMIN D3, ORAL Take 2 tablets by mouth 2 (two) times daily.      clopidogreL (PLAVIX) 75 mg tablet Take 1 tablet (75 mg total) by mouth once daily. 30 tablet 11    doxazosin (CARDURA) 4 MG tablet Take 1 tablet (4 mg total) by mouth every 12 (twelve) hours. 180 tablet 3    famotidine (PEPCID) 20 MG tablet Take 1 tablet (20 mg total) by mouth 2  (two) times daily. 180 tablet 3    fexofenadine HCl (ALLEGRA ORAL) Take 1 tablet by mouth daily as needed (allergies).      fluticasone furoate-vilanteroL (BREO) 100-25 mcg/dose diskus inhaler Inhale 1 puff into the lungs once daily. Controller. Use this inhaler every day.      fluticasone propionate (FLONASE) 50 mcg/actuation nasal spray 1 spray (50 mcg total) by Each Nostril route daily as needed for Allergies.      FLUZONE HIGHDOSE QUAD 23-24  mcg/0.7 mL Syrg       furosemide (LASIX) 20 MG tablet One po QAM, and one po at lunchtime if swelling prn 180 tablet 1    furosemide (LASIX) 80 MG tablet Take 1 tablet (80 mg total) by mouth every morning. 90 tablet 1    gabapentin (NEURONTIN) 100 MG capsule Take 1 capsule (100 mg total) by mouth as needed (pain).      hydrALAZINE (APRESOLINE) 50 MG tablet TAKE 1 TABLET (50 MG TOTAL) BY MOUTH 3 (THREE) TIMES DAILY. 270 tablet 3    HYDROcodone-acetaminophen (NORCO) 5-325 mg per tablet Take 1 tablet by mouth every 6 (six) hours as needed for Pain. 15 tablet 0    isosorbide mononitrate (IMDUR) 30 MG 24 hr tablet Take 4 tablets (120 mg total) by mouth once daily. 120 tablet 11    metoprolol succinate (TOPROL-XL) 25 MG 24 hr tablet Take 0.5 tablets (12.5 mg total) by mouth 2 (two) times daily. 90 tablet 3    MITIGARE 0.6 mg Cap TAKE 1 CAPSULE BY MOUTH TWICE A DAY AS NEEDED GOUT FLARE UP TO 3 DAYS 15 capsule 11    multivitamin (THERAGRAN) per tablet Take 1 tablet by mouth Daily.      NIFEdipine (PROCARDIA-XL) 60 MG (OSM) 24 hr tablet Take 1 tablet (60 mg total) by mouth 2 (two) times a day. 180 tablet 3    nitroGLYCERIN (NITROSTAT) 0.4 MG SL tablet Place 1 tablet (0.4 mg total) under the tongue every 5 (five) minutes as needed for Chest pain. 25 tablet 0    omeprazole (PRILOSEC) 20 MG capsule Take 1 capsule (20 mg total) by mouth daily as needed (heartburn).      paricalcitoL (ZEMPLAR) 1 MCG capsule TAKE 1 CAPSULE ON MONDAY, WEDNESDAY AND FRIDAY 36 capsule 3    PFIZER COVID  BIVAL,12Y UP,,PF, 30 mcg/0.3 mL injection Inject into the muscle.      potassium chloride (KLOR-CON) 10 MEQ TbSR Take 1 tablet (10 mEq total) by mouth daily as needed (to be taken with lasix)., 90 tablet 3    predniSONE (DELTASONE) 5 MG tablet TAKE 1 TABLET BY MOUTH EVERY DAY IN THE MORNING 90 tablet 3    pulse oximeter (PULSE OXIMETER) device by Apply Externally route 2 (two) times a day. Use twice daily at 8 AM and 3 PM and record the value in [email or web address] as directed. 1 each 0    SPIKEVAX [id number],12Y UP,,PF, 50 mcg/0.5 mL injection       tacrolimus (PROGRAF) 1 MG Cap TAKE 3 CAPSULES (3 MG TOTAL) BY MOUTH EVERY 12 (TWELVE) HOURS. 540 capsule 4    tiotropium bromide (SPIRIVA RESPIMAT) 1.25 mcg/actuation inhaler Inhale 2 puffs into the lungs once daily. Controller. Use this inhaler every day. 4 g 11     No current facility-administered medications for this visit.       ROS  Review of Systems   Constitutional:  Positive for chills. Negative for activity change and fever.   HENT:  Negative for congestion and hearing loss.    Eyes:  Negative for pain and visual disturbance.   Respiratory:  Negative for cough and shortness of breath.    Cardiovascular:  Negative for chest pain and palpitations.   Gastrointestinal:  Negative for abdominal pain, constipation, diarrhea, nausea and vomiting.   Endocrine: Negative.    Genitourinary: Negative.    Musculoskeletal:  Negative for arthralgias and myalgias.   Skin: Negative.    Allergic/Immunologic: Negative.    Neurological:  Negative for dizziness, light-headedness and headaches.   Hematological: Negative.          OBJECTIVE     Physical Exam  Vitals:    02/02/24 0937   BP: 132/85   Pulse: 61    Body mass index is 26.38 kg/m².            Physical Exam  Vitals reviewed.   Constitutional:       General: He is not in acute distress.     Appearance: Normal appearance.   HENT:      Head: Normocephalic and atraumatic.      Mouth/Throat:      Mouth: Mucous membranes are moist.       Pharynx: Oropharynx is clear.   Eyes:      Extraocular Movements: Extraocular movements intact.      Conjunctiva/sclera: Conjunctivae normal.      Pupils: Pupils are equal, round, and reactive to light.   Cardiovascular:      Rate and Rhythm: Normal rate and regular rhythm.      Pulses: Normal pulses.      Heart sounds: Normal heart sounds.   Pulmonary:      Effort: Pulmonary effort is normal.      Breath sounds: Normal breath sounds.   Abdominal:      General: Bowel sounds are normal. There is no distension.      Palpations: Abdomen is soft. There is no mass.      Tenderness: There is no abdominal tenderness. There is no guarding.   Musculoskeletal:         General: Normal range of motion.      Cervical back: Normal range of motion and neck supple. No rigidity or tenderness.      Right lower leg: No edema.      Left lower leg: No edema.   Lymphadenopathy:      Cervical: No cervical adenopathy.   Skin:     General: Skin is warm and dry.   Neurological:      General: No focal deficit present.      Mental Status: He is alert.   Psychiatric:         Mood and Affect: Mood normal.         Behavior: Behavior normal.           Health Maintenance         Date Due Completion Date    RSV Vaccine (Age 60+ and Pregnant patients) (1 - 1-dose 60+ series) Never done ---    Colorectal Cancer Screening 03/03/2023 3/3/2021    PROSTATE-SPECIFIC ANTIGEN 01/04/2024 1/4/2023    LDCT Lung Screen 10/13/2024 10/13/2023    Hemoglobin A1c (Prediabetes) 11/23/2024 11/23/2023    High Dose Statin 12/21/2024 12/21/2023    Lipid Panel 01/04/2028 1/4/2023    TETANUS VACCINE 03/12/2028 3/12/2018              ASSESSMENT     68 y.o. male with     1. Primary hypertension    2. Aortic atherosclerosis    3. Carotid artery disease, unspecified laterality, unspecified type    4. CKD (chronic kidney disease) stage 5, GFR less than 15 ml/min    5. Immunodeficiency due to treatment with immunosuppressive medication    6. Mixed hyperlipidemia    7. Screening  for colorectal cancer    8. Screening PSA (prostate specific antigen)    9. Chills    10. Sleeping difficulty    11. Chronic diastolic heart failure    12. Paroxysmal atrial fibrillation    13. Secondary renal hyperparathyroidism    14. Thrombocytopenia        PLAN:     1. Primary hypertension  - Controlled on current regimen. Continue.     2. Aortic atherosclerosis  - Stable on statin. Lipid panel pending.     3. Carotid artery disease, unspecified laterality, unspecified type  - Stable on statin, clopidogrel. Lipid panel pending.     4. CKD (chronic kidney disease) stage 5, GFR less than 15 ml/min  - Preparing for HD. Continue follow up with nephrology.     5. Immunodeficiency due to treatment with immunosuppressive medication  - On chronic prednisone, tacrolimus. Vaccinations are all up to date.     6. Mixed hyperlipidemia  - Stable on statin. Lipid panel pending.     7. Screening for colorectal cancer  - Ambulatory referral/consult to Endo Procedure ; Future    8. Screening PSA (prostate specific antigen)  - PSA, SCREENING; Future    9. Chills  - Asymptomatic otherwise. On amiodarone, recheck TSH. Last checked 7 months ago was normal.   - TSH; Future    10. Sleeping difficulty  - Counseled on sleep hygiene. Trial of Trazodone.   - traZODone (DESYREL) 50 MG tablet; Take 1 tablet (50 mg total) by mouth nightly as needed for Insomnia.  Dispense: 90 tablet; Refill: 3    11. Chronic diastolic heart failure  - Stable on current regimen, continue.    12. Paroxysmal atrial fibrillation  - RRR on exam today. Continue follow up with EP.     13. Secondary renal hyperparathyroidism  - Normal Calcium level on renal function panel 12/20/23. Continue follow up with nephrology.     14. Thrombocytopenia  - Platelet level 111 on last CBC. Established with Heme/Onc, work up pending. Continue follow up.         RTC in 6 months, earlier PRN       Anatoliy Colindres MD  Family Medicine  [facility]  02/02/2024    This document was created using voice recognition software (M*Modal Fluency Direct). Although it may be edited, this document may contain errors related to incorrect recognition of the spoken word. Please call the physician if clarification is needed.       No follow-ups on file.

## 2024-02-06 ENCOUNTER — LAB VISIT (OUTPATIENT)
Dept: LAB | Facility: HOSPITAL | Age: 69
End: 2024-02-06
Attending: STUDENT IN AN ORGANIZED HEALTH CARE EDUCATION/TRAINING PROGRAM
Payer: MEDICARE

## 2024-02-06 ENCOUNTER — OFFICE VISIT (OUTPATIENT)
Dept: VASCULAR SURGERY | Facility: CLINIC | Age: 69
End: 2024-02-06
Payer: MEDICARE

## 2024-02-06 VITALS
RESPIRATION RATE: 18 BRPM | SYSTOLIC BLOOD PRESSURE: 148 MMHG | TEMPERATURE: 98 F | WEIGHT: 199.75 LBS | HEIGHT: 73 IN | HEART RATE: 54 BPM | DIASTOLIC BLOOD PRESSURE: 54 MMHG | BODY MASS INDEX: 26.47 KG/M2

## 2024-02-06 DIAGNOSIS — N18.6 ESRD (END STAGE RENAL DISEASE) ON DIALYSIS: Primary | ICD-10-CM

## 2024-02-06 DIAGNOSIS — N18.5 CKD (CHRONIC KIDNEY DISEASE) STAGE 5, GFR LESS THAN 15 ML/MIN: Primary | ICD-10-CM

## 2024-02-06 DIAGNOSIS — Z99.2 ESRD (END STAGE RENAL DISEASE) ON DIALYSIS: Primary | ICD-10-CM

## 2024-02-06 DIAGNOSIS — R68.83 CHILLS: ICD-10-CM

## 2024-02-06 DIAGNOSIS — Z12.5 SCREENING PSA (PROSTATE SPECIFIC ANTIGEN): ICD-10-CM

## 2024-02-06 DIAGNOSIS — T82.858D ARTERIOVENOUS FISTULA STENOSIS, SUBSEQUENT ENCOUNTER: ICD-10-CM

## 2024-02-06 LAB
COMPLEXED PSA SERPL-MCNC: 0.56 NG/ML (ref 0–4)
TSH SERPL DL<=0.005 MIU/L-ACNC: 1.08 UIU/ML (ref 0.4–4)

## 2024-02-06 PROCEDURE — 1159F MED LIST DOCD IN RCRD: CPT | Mod: CPTII,S$GLB,, | Performed by: SURGERY

## 2024-02-06 PROCEDURE — 84443 ASSAY THYROID STIM HORMONE: CPT | Performed by: STUDENT IN AN ORGANIZED HEALTH CARE EDUCATION/TRAINING PROGRAM

## 2024-02-06 PROCEDURE — 1160F RVW MEDS BY RX/DR IN RCRD: CPT | Mod: CPTII,S$GLB,, | Performed by: SURGERY

## 2024-02-06 PROCEDURE — 36415 COLL VENOUS BLD VENIPUNCTURE: CPT | Performed by: STUDENT IN AN ORGANIZED HEALTH CARE EDUCATION/TRAINING PROGRAM

## 2024-02-06 PROCEDURE — 99999 PR PBB SHADOW E&M-EST. PATIENT-LVL V: CPT | Mod: PBBFAC,,, | Performed by: SURGERY

## 2024-02-06 PROCEDURE — 99024 POSTOP FOLLOW-UP VISIT: CPT | Mod: S$GLB,,, | Performed by: SURGERY

## 2024-02-06 PROCEDURE — 3288F FALL RISK ASSESSMENT DOCD: CPT | Mod: CPTII,S$GLB,, | Performed by: SURGERY

## 2024-02-06 PROCEDURE — 3077F SYST BP >= 140 MM HG: CPT | Mod: CPTII,S$GLB,, | Performed by: SURGERY

## 2024-02-06 PROCEDURE — 1126F AMNT PAIN NOTED NONE PRSNT: CPT | Mod: CPTII,S$GLB,, | Performed by: SURGERY

## 2024-02-06 PROCEDURE — 3078F DIAST BP <80 MM HG: CPT | Mod: CPTII,S$GLB,, | Performed by: SURGERY

## 2024-02-06 PROCEDURE — 84153 ASSAY OF PSA TOTAL: CPT | Performed by: STUDENT IN AN ORGANIZED HEALTH CARE EDUCATION/TRAINING PROGRAM

## 2024-02-06 PROCEDURE — 1101F PT FALLS ASSESS-DOCD LE1/YR: CPT | Mod: CPTII,S$GLB,, | Performed by: SURGERY

## 2024-02-06 RX ORDER — SODIUM CHLORIDE 9 MG/ML
INJECTION, SOLUTION INTRAVENOUS CONTINUOUS
Status: CANCELLED | OUTPATIENT
Start: 2024-02-06

## 2024-02-06 NOTE — H&P (VIEW-ONLY)
VASCULAR SURGERY SERVICE    REFERRING DOCTOR: Emre Latif MD    CHIEF COMPLAINT: CKD 5    HISTORY OF PRESENT ILLNESS: Ibrahima Phelps is a 68 y.o. male status post 2 kidney transplantations the last in 2005, now with the client renal function with a GFR of 9.7 not yet initiated hemodialysis.  He is right-handed.  He would a Veronica AV fistula in the left many many years ago.    He is taking Eliquis for atrial fibrillation.    He is no history of AICD or pacemaker placement    02/06/2024:  This is initial follow-up visit status post left brachiocephalic AV fistula creation 12/18/2023.  He has not initiated hemodialysis.  Denies any hand pain weakness or numbness.  This fistula was felt likely that he had a outflow vein stenosis in the cephalic vein    Past Medical History:   Diagnosis Date    Atrial fibrillation     CAD (coronary artery disease) 2006    MI in 2006    CHF (congestive heart failure) 2017    CKD (chronic kidney disease) stage 3, GFR 30-59 ml/min     Dr. Latif.  in transplanted kidney    COVID-19 2/7/2023    Diverticulosis     Hyperlipidemia     Hypertension     Keloid cicatrix     Metabolic bone disease     Other emphysema 10/1/2019    Pericarditis     S/P kidney transplant 1992    x2 (1992 & 2005),    Thrombocytopenia     Thyroid disease     Tobacco use 09/05/2014       Past Surgical History:   Procedure Laterality Date    AV FISTULA PLACEMENT Left 12/18/2023    Procedure: CREATION, AV FISTULA;  Surgeon: JUANI Melendez III, MD;  [facility];  Service: Vascular;  Laterality: Left;  LUE AVF creation vs AVG insertion    CARDIOVERSION  04/30/15    CHOLECYSTECTOMY      COLONOSCOPY  April 20, 2011    Diverticulosis, repeat recommended in 3 yrs., repeat colonoscopy 2014 revealed 2 polyps.  He should return in 5 years.    COLONOSCOPY N/A 3/13/2020    Procedure: COLONOSCOPY;  Surgeon: Chon Casper MD;  Location: [facility] (4TH FLR);  Service: Endoscopy;  Laterality: N/A;  ok to  "hold coumadin x5days-see telephone encounter 2/4/20-tb    COLONOSCOPY N/A 2/4/2021    Procedure: COLONOSCOPY;  Surgeon: Chon Casper MD;  Location: [facility] (4TH FLR);  Service: Endoscopy;  Laterality: N/A;  Eliquis - per Dr. GAVIN chua to hold x 2 days and "restarted asap"- ERW  last prep "poor", [id number] ordered/ Pt requests Dr. Casper  prep ins. mailed - COVID screening on 2/1/21 PCW- ERW    COLONOSCOPY N/A 3/3/2021    Procedure: COLONOSCOPY;  Surgeon: Chon Casper MD;  Location: [facility] (4TH FLR);  Service: Endoscopy;  Laterality: N/A;  Patient with his second poor bowel pre and has poor prep today.  If patient not intersted or can't get colonoscopy tomorrow will need constipation bowel prep and will need to restart his Eliquis today.Thanks,Chon     per Dr Blunt-ok to hold Eliquis 2 days prior      COVID     CORONARY ANGIOPLASTY WITH STENT PLACEMENT  9/13/2006    IRRIGATION AND DEBRIDEMENT Right 8/30/2023    Procedure: IRRIGATION AND DEBRIDEMENT, RIGHT MIDDLE FINGER;  Surgeon: Martin Larsen MD;  [facility];  Service: Orthopedics;  Laterality: Right;    KIDNEY TRANSPLANT  2005    PARATHYROIDECTOMY      TREATMENT OF CARDIAC ARRHYTHMIA N/A 3/28/2022    Procedure: CARDIOVERSION;  Surgeon: Migue Blunt MD;  Location: [facility] EP LAB;  Service: Cardiology;  Laterality: N/A;  AF, DCC, MAC, GP, 3 PREP*RODRIGO deferred pt 100% compliant*         Current Outpatient Medications:     acetaminophen (TYLENOL) 500 MG tablet, Take 1 tablet (500 mg total) by mouth every 6 (six) hours as needed for Pain., Disp: 20 tablet, Rfl: 0    albuterol (VENTOLIN HFA) 90 mcg/actuation inhaler, Inhale 2 puffs into the lungs every 6 (six) hours as needed for Wheezing or Shortness of Breath. Rescue, Disp: 18 g, Rfl: 3    albuterol-ipratropium (DUO-NEB) 2.5 mg-0.5 mg/3 mL nebulizer solution, Take 3 mLs by nebulization every 6 (six) hours as needed for Wheezing. Rescue. Can be used in place of albuterol " inhaler., Disp: 75 mL, Rfl: 0    amiodarone (PACERONE) 200 MG Tab, Take 1 tablet (200 mg total) by mouth once daily., Disp: 30 tablet, Rfl: 11    apixaban (ELIQUIS) 5 mg Tab, Take 1 tablet (5 mg total) by mouth 2 (two) times daily., Disp: 180 tablet, Rfl: 3    atorvastatin (LIPITOR) 40 MG tablet, Take 2 tablets (80 mg total) by mouth once daily., Disp: 180 tablet, Rfl: 3    b complex vitamins (B COMPLEX-VITAMIN B12) tablet, Take 1 tablet by mouth once daily., Disp: 90 tablet, Rfl: 3    calcium acetate,phosphat bind, (PHOSLO) 667 mg capsule, TAKE 1 CAPSULE BY MOUTH THREE TIMES A DAY WITH MEALS AND SNACKS, Disp: 360 capsule, Rfl: 1    calcium carbonate (CALCIUM 600 ORAL), Take 1 tablet by mouth 2 (two) times a day., Disp: , Rfl:     CHOLECALCIFEROL, VITAMIN D3, ORAL, Take 2 tablets by mouth 2 (two) times daily., Disp: , Rfl:     clopidogreL (PLAVIX) 75 mg tablet, Take 1 tablet (75 mg total) by mouth once daily., Disp: 30 tablet, Rfl: 11    doxazosin (CARDURA) 4 MG tablet, Take 1 tablet (4 mg total) by mouth every 12 (twelve) hours., Disp: 180 tablet, Rfl: 3    famotidine (PEPCID) 20 MG tablet, Take 1 tablet (20 mg total) by mouth 2 (two) times daily., Disp: 180 tablet, Rfl: 3    fexofenadine HCl (ALLEGRA ORAL), Take 1 tablet by mouth daily as needed (allergies)., Disp: , Rfl:     fluticasone propionate (FLONASE) 50 mcg/actuation nasal spray, 1 spray (50 mcg total) by Each Nostril route daily as needed for Allergies., Disp: , Rfl:     furosemide (LASIX) 20 MG tablet, One po QAM, and one po at lunchtime if swelling prn, Disp: 180 tablet, Rfl: 1    gabapentin (NEURONTIN) 100 MG capsule, Take 1 capsule (100 mg total) by mouth as needed (pain)., Disp: , Rfl:     hydrALAZINE (APRESOLINE) 50 MG tablet, TAKE 1 TABLET (50 MG TOTAL) BY MOUTH 3 (THREE) TIMES DAILY., Disp: 270 tablet, Rfl: 3    isosorbide mononitrate (IMDUR) 30 MG 24 hr tablet, Take 4 tablets (120 mg total) by mouth once daily., Disp: 120 tablet, Rfl: 11     metoprolol succinate (TOPROL-XL) 25 MG 24 hr tablet, Take 0.5 tablets (12.5 mg total) by mouth 2 (two) times daily., Disp: 90 tablet, Rfl: 3    MITIGARE 0.6 mg Cap, TAKE 1 CAPSULE BY MOUTH TWICE A DAY AS NEEDED GOUT FLARE UP TO 3 DAYS, Disp: 15 capsule, Rfl: 11    multivitamin (THERAGRAN) per tablet, Take 1 tablet by mouth Daily., Disp: , Rfl:     NIFEdipine (PROCARDIA-XL) 60 MG (OSM) 24 hr tablet, Take 1 tablet (60 mg total) by mouth 2 (two) times a day., Disp: 180 tablet, Rfl: 3    omeprazole (PRILOSEC) 20 MG capsule, Take 1 capsule (20 mg total) by mouth daily as needed (heartburn)., Disp: , Rfl:     paricalcitoL (ZEMPLAR) 1 MCG capsule, TAKE 1 CAPSULE ON MONDAY, WEDNESDAY AND FRIDAY, Disp: 36 capsule, Rfl: 3    potassium chloride (KLOR-CON) 10 MEQ TbSR, Take 1 tablet (10 mEq total) by mouth daily as needed (to be taken with lasix).,, Disp: 90 tablet, Rfl: 3    predniSONE (DELTASONE) 5 MG tablet, TAKE 1 TABLET BY MOUTH EVERY DAY IN THE MORNING, Disp: 90 tablet, Rfl: 3    pulse oximeter (PULSE OXIMETER) device, by Apply Externally route 2 (two) times a day. Use twice daily at 8 AM and 3 PM and record the value in MyChart as directed., Disp: 1 each, Rfl: 0    tacrolimus (PROGRAF) 1 MG Cap, TAKE 3 CAPSULES (3 MG TOTAL) BY MOUTH EVERY 12 (TWELVE) HOURS., Disp: 540 capsule, Rfl: 4    traZODone (DESYREL) 50 MG tablet, Take 1 tablet (50 mg total) by mouth nightly as needed for Insomnia., Disp: 90 tablet, Rfl: 3    fluticasone furoate-vilanteroL (BREO) 100-25 mcg/dose diskus inhaler, Inhale 1 puff into the lungs once daily. Controller. Use this inhaler every day. (Patient not taking: Reported on 2/6/2024), Disp: , Rfl:     FLUZONE HIGHDOSE QUAD 23-24  mcg/0.7 mL Syrg, , Disp: , Rfl:     furosemide (LASIX) 80 MG tablet, Take 1 tablet (80 mg total) by mouth every morning. (Patient not taking: Reported on 2/6/2024), Disp: 90 tablet, Rfl: 1    HYDROcodone-acetaminophen (NORCO) 5-325 mg per tablet, Take 1 tablet by  mouth every 6 (six) hours as needed for Pain. (Patient not taking: Reported on 2/6/2024), Disp: 15 tablet, Rfl: 0    nitroGLYCERIN (NITROSTAT) 0.4 MG SL tablet, Place 1 tablet (0.4 mg total) under the tongue every 5 (five) minutes as needed for Chest pain. (Patient not taking: Reported on 2/6/2024), Disp: 25 tablet, Rfl: 0    PFIZER COVID BIVAL,12Y UP,,PF, 30 mcg/0.3 mL injection, Inject into the muscle., Disp: , Rfl:     SPIKEVAX 2320-8334,12Y UP,,PF, 50 mcg/0.5 mL injection, , Disp: , Rfl:     tiotropium bromide (SPIRIVA RESPIMAT) 1.25 mcg/actuation inhaler, Inhale 2 puffs into the lungs once daily. Controller. Use this inhaler every day. (Patient not taking: Reported on 2/6/2024), Disp: 4 g, Rfl: 11    Review of patient's allergies indicates:   Allergen Reactions    Ace inhibitors Swelling    Verapamil Other (See Comments)     Other reaction(s): Unknown    Povidone-iodine Itching       Family History   Problem Relation Age of Onset    No Known Problems Sister     No Known Problems Brother     Thyroid disease Mother         s/p surgery    Heart disease Father         had pacemaker    No Known Problems Sister     Kidney failure Sister     Kidney disease Sister     No Known Problems Sister     Kidney disease Brother     Kidney failure Brother         s/p transplant    Diabetes Mellitus Neg Hx     Stroke Neg Hx     Heart attack Neg Hx     Cancer Neg Hx     Celiac disease Neg Hx     Cirrhosis Neg Hx     Colon cancer Neg Hx     Esophageal cancer Neg Hx     Inflammatory bowel disease Neg Hx     Rectal cancer Neg Hx     Stomach cancer Neg Hx     Ulcerative colitis Neg Hx     Liver disease Neg Hx     Liver cancer Neg Hx     Crohn's disease Neg Hx     Melanoma Neg Hx        Social History     Tobacco Use    Smoking status: Every Day     Current packs/day: 0.00     Average packs/day: 0.5 packs/day for 40.0 years (20.0 ttl pk-yrs)     Types: Cigarettes     Start date: 2/28/1982     Last attempt to quit: 2/28/2022     Years  "since quittin.9    Smokeless tobacco: Never    Tobacco comments:     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     Patient is currently retired   Substance Use Topics    Alcohol use: No    Drug use: No       PHYSICAL EXAM:   BP (!) 148/54 (BP Location: Right arm, Patient Position: Sitting, BP Method: Medium (Automatic))   Pulse (!) 54   Temp 97.9 °F (36.6 °C) (Oral)   Resp 18   Ht 6' 1" (1.854 m)   Wt 90.6 kg (199 lb 11.8 oz)   BMI 26.35 kg/m²   Constitutional:  Alert,   Well-appearing  In no distress.   Neurological: Normal speech  no focal findings  CN II - XII grossly intact.    Psychiatric: Mood and affect appropriate and symmetric.   HEENT: Normocephalic / atraumatic  PERRLA  Midline trachea  No scars across the neck   Cardiac: Regular rate and rhythm.   Pulmonary: Normal pulmonary effort.   Abdomen: Soft, not distended.     Skin: Warm and well perfused.    Vascular:  Right radial pulse 1 to 2+, left brachial pulse 3 +   Extremities/  Musculoskeletal: Left arm shows a excellently developed AV fistula.  It was very pulsatile with a abrupt change in the upper 3rd of the fistula to a weak thrill     IMAGING:  Duplex of the fistula shows several areas of stenosis of the worse peak velocity of 678.  Flow volume 712 mils per minute  Diameter 9.7 proximal, 8.7 mid, 10.0 distal  Diameter 1.2 proximal 2.0 mid, 1.4 distal       IMPRESSION:  Excellent early maturation , left brachiocephalic AV fistula.  However there is a significant outflow stenosis that requires intervention    PLAN:  Left fistulogram and intervention 2024  Cath lab case  Hold [facility] 2 days prior    I have explained the risks, benefits and alternatives for this procedure in detail.  The patient voices understanding and all questions have be answered, and agrees to proceed with the procedure.     INDIGO Melendez III, MD, FACS  Professor and Chief, Vascular and Endovascular Surgery      "

## 2024-02-06 NOTE — PROGRESS NOTES
VASCULAR SURGERY SERVICE    REFERRING DOCTOR: Emre Latif MD    CHIEF COMPLAINT: CKD 5    HISTORY OF PRESENT ILLNESS: Ibrahima Phelps is a 68 y.o. male status post 2 kidney transplantations the last in 2005, now with the client renal function with a GFR of 9.7 not yet initiated hemodialysis.  He is right-handed.  He would a Veronica AV fistula in the left many many years ago.    He is taking Eliquis for atrial fibrillation.    He is no history of AICD or pacemaker placement    02/06/2024:  This is initial follow-up visit status post left brachiocephalic AV fistula creation 12/18/2023.  He has not initiated hemodialysis.  Denies any hand pain weakness or numbness.  This fistula was felt likely that he had a outflow vein stenosis in the cephalic vein    Past Medical History:   Diagnosis Date    Atrial fibrillation     CAD (coronary artery disease) 2006    MI in 2006    CHF (congestive heart failure) 2017    CKD (chronic kidney disease) stage 3, GFR 30-59 ml/min     Dr. Latif.  in transplanted kidney    COVID-19 2/7/2023    Diverticulosis     Hyperlipidemia     Hypertension     Keloid cicatrix     Metabolic bone disease     Other emphysema 10/1/2019    Pericarditis     S/P kidney transplant 1992    x2 (1992 & 2005),    Thrombocytopenia     Thyroid disease     Tobacco use 09/05/2014       Past Surgical History:   Procedure Laterality Date    AV FISTULA PLACEMENT Left 12/18/2023    Procedure: CREATION, AV FISTULA;  Surgeon: JUANI Melendez III, MD;  [facility]  Service: Vascular;  Laterality: Left;  LUE AVF creation vs AVG insertion    CARDIOVERSION  04/30/15    CHOLECYSTECTOMY      COLONOSCOPY  April 20, 2011    Diverticulosis, repeat recommended in 3 yrs., repeat colonoscopy 2014 revealed 2 polyps.  He should return in 5 years.    COLONOSCOPY N/A 3/13/2020    Procedure: COLONOSCOPY;  Surgeon: Chon Casper MD;  Location: [facility] (4TH FLR);  Service: Endoscopy;  Laterality: N/A;  ok to  "hold coumadin x5days-see telephone encounter 2/4/20-tb    COLONOSCOPY N/A 2/4/2021    Procedure: COLONOSCOPY;  Surgeon: Chon Casper MD;  Location: [facility] (4TH FLR);  Service: Endoscopy;  Laterality: N/A;  Eliquis - per Dr. GAVIN chua to hold x 2 days and "restarted asap"- ERW  last prep "poor", [id number] ordered/ Pt requests Dr. Casper  prep ins. mailed - COVID screening on 2/1/21 PCW- ERW    COLONOSCOPY N/A 3/3/2021    Procedure: COLONOSCOPY;  Surgeon: Chon Casper MD;  Location: [facility] (4TH FLR);  Service: Endoscopy;  Laterality: N/A;  Patient with his second poor bowel pre and has poor prep today.  If patient not intersted or can't get colonoscopy tomorrow will need constipation bowel prep and will need to restart his Eliquis today.Thanks,Chon     per Dr Blunt-ok to hold Eliquis 2 days prior      COVID     CORONARY ANGIOPLASTY WITH STENT PLACEMENT  9/13/2006    IRRIGATION AND DEBRIDEMENT Right 8/30/2023    Procedure: IRRIGATION AND DEBRIDEMENT, RIGHT MIDDLE FINGER;  Surgeon: Martin Larsen MD;  [facility];  Service: Orthopedics;  Laterality: Right;    KIDNEY TRANSPLANT  2005    PARATHYROIDECTOMY      TREATMENT OF CARDIAC ARRHYTHMIA N/A 3/28/2022    Procedure: CARDIOVERSION;  Surgeon: Migue Blunt MD;  Location: [facility] EP LAB;  Service: Cardiology;  Laterality: N/A;  AF, DCC, MAC, GP, 3 PREP*RODRIGO deferred pt 100% compliant*         Current Outpatient Medications:     acetaminophen (TYLENOL) 500 MG tablet, Take 1 tablet (500 mg total) by mouth every 6 (six) hours as needed for Pain., Disp: 20 tablet, Rfl: 0    albuterol (VENTOLIN HFA) 90 mcg/actuation inhaler, Inhale 2 puffs into the lungs every 6 (six) hours as needed for Wheezing or Shortness of Breath. Rescue, Disp: 18 g, Rfl: 3    albuterol-ipratropium (DUO-NEB) 2.5 mg-0.5 mg/3 mL nebulizer solution, Take 3 mLs by nebulization every 6 (six) hours as needed for Wheezing. Rescue. Can be used in place of albuterol " inhaler., Disp: 75 mL, Rfl: 0    amiodarone (PACERONE) 200 MG Tab, Take 1 tablet (200 mg total) by mouth once daily., Disp: 30 tablet, Rfl: 11    apixaban (ELIQUIS) 5 mg Tab, Take 1 tablet (5 mg total) by mouth 2 (two) times daily., Disp: 180 tablet, Rfl: 3    atorvastatin (LIPITOR) 40 MG tablet, Take 2 tablets (80 mg total) by mouth once daily., Disp: 180 tablet, Rfl: 3    b complex vitamins (B COMPLEX-VITAMIN B12) tablet, Take 1 tablet by mouth once daily., Disp: 90 tablet, Rfl: 3    calcium acetate,phosphat bind, (PHOSLO) 667 mg capsule, TAKE 1 CAPSULE BY MOUTH THREE TIMES A DAY WITH MEALS AND SNACKS, Disp: 360 capsule, Rfl: 1    calcium carbonate (CALCIUM 600 ORAL), Take 1 tablet by mouth 2 (two) times a day., Disp: , Rfl:     CHOLECALCIFEROL, VITAMIN D3, ORAL, Take 2 tablets by mouth 2 (two) times daily., Disp: , Rfl:     clopidogreL (PLAVIX) 75 mg tablet, Take 1 tablet (75 mg total) by mouth once daily., Disp: 30 tablet, Rfl: 11    doxazosin (CARDURA) 4 MG tablet, Take 1 tablet (4 mg total) by mouth every 12 (twelve) hours., Disp: 180 tablet, Rfl: 3    famotidine (PEPCID) 20 MG tablet, Take 1 tablet (20 mg total) by mouth 2 (two) times daily., Disp: 180 tablet, Rfl: 3    fexofenadine HCl (ALLEGRA ORAL), Take 1 tablet by mouth daily as needed (allergies)., Disp: , Rfl:     fluticasone propionate (FLONASE) 50 mcg/actuation nasal spray, 1 spray (50 mcg total) by Each Nostril route daily as needed for Allergies., Disp: , Rfl:     furosemide (LASIX) 20 MG tablet, One po QAM, and one po at lunchtime if swelling prn, Disp: 180 tablet, Rfl: 1    gabapentin (NEURONTIN) 100 MG capsule, Take 1 capsule (100 mg total) by mouth as needed (pain)., Disp: , Rfl:     hydrALAZINE (APRESOLINE) 50 MG tablet, TAKE 1 TABLET (50 MG TOTAL) BY MOUTH 3 (THREE) TIMES DAILY., Disp: 270 tablet, Rfl: 3    isosorbide mononitrate (IMDUR) 30 MG 24 hr tablet, Take 4 tablets (120 mg total) by mouth once daily., Disp: 120 tablet, Rfl: 11     metoprolol succinate (TOPROL-XL) 25 MG 24 hr tablet, Take 0.5 tablets (12.5 mg total) by mouth 2 (two) times daily., Disp: 90 tablet, Rfl: 3    MITIGARE 0.6 mg Cap, TAKE 1 CAPSULE BY MOUTH TWICE A DAY AS NEEDED GOUT FLARE UP TO 3 DAYS, Disp: 15 capsule, Rfl: 11    multivitamin (THERAGRAN) per tablet, Take 1 tablet by mouth Daily., Disp: , Rfl:     NIFEdipine (PROCARDIA-XL) 60 MG (OSM) 24 hr tablet, Take 1 tablet (60 mg total) by mouth 2 (two) times a day., Disp: 180 tablet, Rfl: 3    omeprazole (PRILOSEC) 20 MG capsule, Take 1 capsule (20 mg total) by mouth daily as needed (heartburn)., Disp: , Rfl:     paricalcitoL (ZEMPLAR) 1 MCG capsule, TAKE 1 CAPSULE ON MONDAY, WEDNESDAY AND FRIDAY, Disp: 36 capsule, Rfl: 3    potassium chloride (KLOR-CON) 10 MEQ TbSR, Take 1 tablet (10 mEq total) by mouth daily as needed (to be taken with lasix).,, Disp: 90 tablet, Rfl: 3    predniSONE (DELTASONE) 5 MG tablet, TAKE 1 TABLET BY MOUTH EVERY DAY IN THE MORNING, Disp: 90 tablet, Rfl: 3    pulse oximeter (PULSE OXIMETER) device, by Apply Externally route 2 (two) times a day. Use twice daily at 8 AM and 3 PM and record the value in MyChart as directed., Disp: 1 each, Rfl: 0    tacrolimus (PROGRAF) 1 MG Cap, TAKE 3 CAPSULES (3 MG TOTAL) BY MOUTH EVERY 12 (TWELVE) HOURS., Disp: 540 capsule, Rfl: 4    traZODone (DESYREL) 50 MG tablet, Take 1 tablet (50 mg total) by mouth nightly as needed for Insomnia., Disp: 90 tablet, Rfl: 3    fluticasone furoate-vilanteroL (BREO) 100-25 mcg/dose diskus inhaler, Inhale 1 puff into the lungs once daily. Controller. Use this inhaler every day. (Patient not taking: Reported on 2/6/2024), Disp: , Rfl:     FLUZONE HIGHDOSE QUAD 23-24  mcg/0.7 mL Syrg, , Disp: , Rfl:     furosemide (LASIX) 80 MG tablet, Take 1 tablet (80 mg total) by mouth every morning. (Patient not taking: Reported on 2/6/2024), Disp: 90 tablet, Rfl: 1    HYDROcodone-acetaminophen (NORCO) 5-325 mg per tablet, Take 1 tablet by  mouth every 6 (six) hours as needed for Pain. (Patient not taking: Reported on 2/6/2024), Disp: 15 tablet, Rfl: 0    nitroGLYCERIN (NITROSTAT) 0.4 MG SL tablet, Place 1 tablet (0.4 mg total) under the tongue every 5 (five) minutes as needed for Chest pain. (Patient not taking: Reported on 2/6/2024), Disp: 25 tablet, Rfl: 0    PFIZER COVID BIVAL,12Y UP,,PF, 30 mcg/0.3 mL injection, Inject into the muscle., Disp: , Rfl:     SPIKEVAX 4173-2947,12Y UP,,PF, 50 mcg/0.5 mL injection, , Disp: , Rfl:     tiotropium bromide (SPIRIVA RESPIMAT) 1.25 mcg/actuation inhaler, Inhale 2 puffs into the lungs once daily. Controller. Use this inhaler every day. (Patient not taking: Reported on 2/6/2024), Disp: 4 g, Rfl: 11    Review of patient's allergies indicates:   Allergen Reactions    Ace inhibitors Swelling    Verapamil Other (See Comments)     Other reaction(s): Unknown    Povidone-iodine Itching       Family History   Problem Relation Age of Onset    No Known Problems Sister     No Known Problems Brother     Thyroid disease Mother         s/p surgery    Heart disease Father         had pacemaker    No Known Problems Sister     Kidney failure Sister     Kidney disease Sister     No Known Problems Sister     Kidney disease Brother     Kidney failure Brother         s/p transplant    Diabetes Mellitus Neg Hx     Stroke Neg Hx     Heart attack Neg Hx     Cancer Neg Hx     Celiac disease Neg Hx     Cirrhosis Neg Hx     Colon cancer Neg Hx     Esophageal cancer Neg Hx     Inflammatory bowel disease Neg Hx     Rectal cancer Neg Hx     Stomach cancer Neg Hx     Ulcerative colitis Neg Hx     Liver disease Neg Hx     Liver cancer Neg Hx     Crohn's disease Neg Hx     Melanoma Neg Hx        Social History     Tobacco Use    Smoking status: Every Day     Current packs/day: 0.00     Average packs/day: 0.5 packs/day for 40.0 years (20.0 ttl pk-yrs)     Types: Cigarettes     Start date: 2/28/1982     Last attempt to quit: 2/28/2022     Years  "since quittin.9    Smokeless tobacco: Never    Tobacco comments:     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     Patient is currently retired   Substance Use Topics    Alcohol use: No    Drug use: No       PHYSICAL EXAM:   BP (!) 148/54 (BP Location: Right arm, Patient Position: Sitting, BP Method: Medium (Automatic))   Pulse (!) 54   Temp 97.9 °F (36.6 °C) (Oral)   Resp 18   Ht 6' 1" (1.854 m)   Wt 90.6 kg (199 lb 11.8 oz)   BMI 26.35 kg/m²   Constitutional:  Alert,   Well-appearing  In no distress.   Neurological: Normal speech  no focal findings  CN II - XII grossly intact.    Psychiatric: Mood and affect appropriate and symmetric.   HEENT: Normocephalic / atraumatic  PERRLA  Midline trachea  No scars across the neck   Cardiac: Regular rate and rhythm.   Pulmonary: Normal pulmonary effort.   Abdomen: Soft, not distended.     Skin: Warm and well perfused.    Vascular:  Right radial pulse 1 to 2+, left brachial pulse 3 +   Extremities/  Musculoskeletal: Left arm shows a excellently developed AV fistula.  It was very pulsatile with a abrupt change in the upper 3rd of the fistula to a weak thrill     IMAGING:  Duplex of the fistula shows several areas of stenosis of the worse peak velocity of 678.  Flow volume 712 mils per minute  Diameter 9.7 proximal, 8.7 mid, 10.0 distal  Diameter 1.2 proximal 2.0 mid, 1.4 distal       IMPRESSION:  Excellent early maturation , left brachiocephalic AV fistula.  However there is a significant outflow stenosis that requires intervention    PLAN:  Left fistulogram and intervention 2024  Cath lab case  Hold [facility] 2 days prior    I have explained the risks, benefits and alternatives for this procedure in detail.  The patient voices understanding and all questions have be answered, and agrees to proceed with the procedure.     INDIGO Melendez III, MD, FACS  Professor and Chief, Vascular and Endovascular Surgery      "

## 2024-02-08 RX ORDER — PREDNISONE 5 MG/1
5 TABLET ORAL DAILY
Qty: 90 TABLET | Refills: 3 | Status: SHIPPED | OUTPATIENT
Start: 2024-02-08

## 2024-02-16 ENCOUNTER — TELEPHONE (OUTPATIENT)
Dept: VASCULAR SURGERY | Facility: CLINIC | Age: 69
End: 2024-02-16
Payer: MEDICARE

## 2024-02-16 NOTE — TELEPHONE ENCOUNTER
Attempted to contact pt to confirm fistulagram with Dr. Melendez on Monday 2/19/24. Voice message left for pt with time of arrival and requesting return call to confirm.

## 2024-02-19 ENCOUNTER — HOSPITAL ENCOUNTER (INPATIENT)
Facility: HOSPITAL | Age: 69
LOS: 2 days | Discharge: HOME | DRG: 252 | End: 2024-02-21
Attending: SURGERY | Admitting: HOSPITALIST
Payer: MEDICARE

## 2024-02-19 DIAGNOSIS — R55 SYNCOPE: ICD-10-CM

## 2024-02-19 DIAGNOSIS — R00.1 BRADYCARDIA: ICD-10-CM

## 2024-02-19 DIAGNOSIS — N18.6 ESRD (END STAGE RENAL DISEASE): Primary | ICD-10-CM

## 2024-02-19 DIAGNOSIS — R07.9 CHEST PAIN: ICD-10-CM

## 2024-02-19 DIAGNOSIS — R00.1 SINUS BRADYCARDIA: ICD-10-CM

## 2024-02-19 DIAGNOSIS — N18.5 CKD (CHRONIC KIDNEY DISEASE) STAGE 5, GFR LESS THAN 15 ML/MIN: ICD-10-CM

## 2024-02-19 DIAGNOSIS — T82.858D ARTERIOVENOUS FISTULA STENOSIS, SUBSEQUENT ENCOUNTER: ICD-10-CM

## 2024-02-19 PROBLEM — K21.9 GERD (GASTROESOPHAGEAL REFLUX DISEASE): Status: ACTIVE | Noted: 2024-02-19

## 2024-02-19 LAB
ANION GAP SERPL CALC-SCNC: 13 MMOL/L (ref 8–16)
BASOPHILS # BLD AUTO: 0.01 K/UL (ref 0–0.2)
BASOPHILS NFR BLD: 0.1 % (ref 0–1.9)
BUN SERPL-MCNC: 52 MG/DL (ref 8–23)
CALCIUM SERPL-MCNC: 9.3 MG/DL (ref 8.7–10.5)
CHLORIDE SERPL-SCNC: 112 MMOL/L (ref 95–110)
CO2 SERPL-SCNC: 18 MMOL/L (ref 23–29)
CREAT SERPL-MCNC: 7.4 MG/DL (ref 0.5–1.4)
DIFFERENTIAL METHOD BLD: ABNORMAL
EOSINOPHIL # BLD AUTO: 0.2 K/UL (ref 0–0.5)
EOSINOPHIL NFR BLD: 2.8 % (ref 0–8)
ERYTHROCYTE [DISTWIDTH] IN BLOOD BY AUTOMATED COUNT: 17.8 % (ref 11.5–14.5)
EST. GFR  (NO RACE VARIABLE): 7.4 ML/MIN/1.73 M^2
GLUCOSE SERPL-MCNC: 103 MG/DL (ref 70–110)
HCT VFR BLD AUTO: 23.9 % (ref 40–54)
HGB BLD-MCNC: 7.7 G/DL (ref 14–18)
IMM GRANULOCYTES # BLD AUTO: 0.11 K/UL (ref 0–0.04)
IMM GRANULOCYTES NFR BLD AUTO: 1.6 % (ref 0–0.5)
LACTATE SERPL-SCNC: 0.8 MMOL/L (ref 0.5–2.2)
LYMPHOCYTES # BLD AUTO: 1.4 K/UL (ref 1–4.8)
LYMPHOCYTES NFR BLD: 20.8 % (ref 18–48)
MCH RBC QN AUTO: 25.9 PG (ref 27–31)
MCHC RBC AUTO-ENTMCNC: 32.2 G/DL (ref 32–36)
MCV RBC AUTO: 81 FL (ref 82–98)
MONOCYTES # BLD AUTO: 0.2 K/UL (ref 0.3–1)
MONOCYTES NFR BLD: 3.4 % (ref 4–15)
NEUTROPHILS # BLD AUTO: 4.7 K/UL (ref 1.8–7.7)
NEUTROPHILS NFR BLD: 71.3 % (ref 38–73)
NRBC BLD-RTO: 0 /100 WBC
OHS QRS DURATION: 100 MS
OHS QRS DURATION: 106 MS
OHS QRS DURATION: 108 MS
OHS QTC CALCULATION: 453 MS
OHS QTC CALCULATION: 459 MS
OHS QTC CALCULATION: 517 MS
PLATELET # BLD AUTO: 118 K/UL (ref 150–450)
PMV BLD AUTO: ABNORMAL FL (ref 9.2–12.9)
POCT GLUCOSE: 139 MG/DL (ref 70–110)
POCT GLUCOSE: 166 MG/DL (ref 70–110)
POTASSIUM SERPL-SCNC: 4 MMOL/L (ref 3.5–5.1)
RBC # BLD AUTO: 2.97 M/UL (ref 4.6–6.2)
SODIUM SERPL-SCNC: 143 MMOL/L (ref 136–145)
TROPONIN I SERPL DL<=0.01 NG/ML-MCNC: 0.06 NG/ML (ref 0–0.03)
TROPONIN I SERPL DL<=0.01 NG/ML-MCNC: 0.08 NG/ML (ref 0–0.03)
WBC # BLD AUTO: 6.67 K/UL (ref 3.9–12.7)

## 2024-02-19 PROCEDURE — 057F3ZZ DILATION OF LEFT CEPHALIC VEIN, PERCUTANEOUS APPROACH: ICD-10-PCS | Performed by: SURGERY

## 2024-02-19 PROCEDURE — 36415 COLL VENOUS BLD VENIPUNCTURE: CPT | Mod: XB

## 2024-02-19 PROCEDURE — 03783ZZ DILATION OF LEFT BRACHIAL ARTERY, PERCUTANEOUS APPROACH: ICD-10-PCS | Performed by: SURGERY

## 2024-02-19 PROCEDURE — 93010 ELECTROCARDIOGRAM REPORT: CPT | Mod: 76,,, | Performed by: INTERNAL MEDICINE

## 2024-02-19 PROCEDURE — 84484 ASSAY OF TROPONIN QUANT: CPT

## 2024-02-19 PROCEDURE — G0378 HOSPITAL OBSERVATION PER HR: HCPCS

## 2024-02-19 PROCEDURE — 93005 ELECTROCARDIOGRAM TRACING: CPT

## 2024-02-19 PROCEDURE — C1894 INTRO/SHEATH, NON-LASER: HCPCS | Performed by: SURGERY

## 2024-02-19 PROCEDURE — 25000242 PHARM REV CODE 250 ALT 637 W/ HCPCS: Performed by: STUDENT IN AN ORGANIZED HEALTH CARE EDUCATION/TRAINING PROGRAM

## 2024-02-19 PROCEDURE — 85025 COMPLETE CBC W/AUTO DIFF WBC: CPT | Performed by: SURGERY

## 2024-02-19 PROCEDURE — 99152 MOD SED SAME PHYS/QHP 5/>YRS: CPT | Mod: ICN,,, | Performed by: SURGERY

## 2024-02-19 PROCEDURE — 25000003 PHARM REV CODE 250: Performed by: SURGERY

## 2024-02-19 PROCEDURE — 83605 ASSAY OF LACTIC ACID: CPT

## 2024-02-19 PROCEDURE — 80048 BASIC METABOLIC PNL TOTAL CA: CPT | Performed by: SURGERY

## 2024-02-19 PROCEDURE — 99153 MOD SED SAME PHYS/QHP EA: CPT | Performed by: SURGERY

## 2024-02-19 PROCEDURE — 36415 COLL VENOUS BLD VENIPUNCTURE: CPT

## 2024-02-19 PROCEDURE — 99223 1ST HOSP IP/OBS HIGH 75: CPT | Mod: GC,,, | Performed by: STUDENT IN AN ORGANIZED HEALTH CARE EDUCATION/TRAINING PROGRAM

## 2024-02-19 PROCEDURE — 93010 ELECTROCARDIOGRAM REPORT: CPT | Mod: ,,, | Performed by: INTERNAL MEDICINE

## 2024-02-19 PROCEDURE — 36902 INTRO CATH DIALYSIS CIRCUIT: CPT | Performed by: SURGERY

## 2024-02-19 PROCEDURE — 27201423 OPTIME MED/SURG SUP & DEVICES STERILE SUPPLY: Performed by: SURGERY

## 2024-02-19 PROCEDURE — 63600175 PHARM REV CODE 636 W HCPCS: Performed by: STUDENT IN AN ORGANIZED HEALTH CARE EDUCATION/TRAINING PROGRAM

## 2024-02-19 PROCEDURE — 25000003 PHARM REV CODE 250

## 2024-02-19 PROCEDURE — C1725 CATH, TRANSLUMIN NON-LASER: HCPCS | Performed by: SURGERY

## 2024-02-19 PROCEDURE — C1769 GUIDE WIRE: HCPCS | Performed by: SURGERY

## 2024-02-19 PROCEDURE — 25000003 PHARM REV CODE 250: Performed by: STUDENT IN AN ORGANIZED HEALTH CARE EDUCATION/TRAINING PROGRAM

## 2024-02-19 PROCEDURE — 84484 ASSAY OF TROPONIN QUANT: CPT | Mod: 91

## 2024-02-19 PROCEDURE — 99152 MOD SED SAME PHYS/QHP 5/>YRS: CPT | Performed by: SURGERY

## 2024-02-19 PROCEDURE — 36902 INTRO CATH DIALYSIS CIRCUIT: CPT | Mod: ICN,,, | Performed by: SURGERY

## 2024-02-19 PROCEDURE — 63600175 PHARM REV CODE 636 W HCPCS: Performed by: SURGERY

## 2024-02-19 RX ORDER — METHYLPREDNISOLONE SODIUM SUCCINATE 125 MG/2ML
INJECTION INTRAMUSCULAR; INTRAVENOUS
Status: DISCONTINUED | OUTPATIENT
Start: 2024-02-19 | End: 2024-02-19

## 2024-02-19 RX ORDER — IPRATROPIUM BROMIDE AND ALBUTEROL SULFATE 2.5; .5 MG/3ML; MG/3ML
3 SOLUTION RESPIRATORY (INHALATION) EVERY 6 HOURS PRN
Status: DISCONTINUED | OUTPATIENT
Start: 2024-02-19 | End: 2024-02-21 | Stop reason: HOSPADM

## 2024-02-19 RX ORDER — PREDNISONE 2.5 MG/1
5 TABLET ORAL DAILY
Status: DISCONTINUED | OUTPATIENT
Start: 2024-02-20 | End: 2024-02-21 | Stop reason: HOSPADM

## 2024-02-19 RX ORDER — FAMOTIDINE 20 MG/1
20 TABLET, FILM COATED ORAL 2 TIMES DAILY
Status: DISCONTINUED | OUTPATIENT
Start: 2024-02-19 | End: 2024-02-19

## 2024-02-19 RX ORDER — FLUTICASONE FUROATE AND VILANTEROL 100; 25 UG/1; UG/1
1 POWDER RESPIRATORY (INHALATION) DAILY
Status: DISCONTINUED | OUTPATIENT
Start: 2024-02-19 | End: 2024-02-21 | Stop reason: HOSPADM

## 2024-02-19 RX ORDER — DOXAZOSIN 2 MG/1
4 TABLET ORAL EVERY 12 HOURS
Status: DISCONTINUED | OUTPATIENT
Start: 2024-02-19 | End: 2024-02-19

## 2024-02-19 RX ORDER — PARICALCITOL 1 UG/1
1 CAPSULE, LIQUID FILLED ORAL
Status: DISCONTINUED | OUTPATIENT
Start: 2024-02-21 | End: 2024-02-21 | Stop reason: HOSPADM

## 2024-02-19 RX ORDER — CLOPIDOGREL BISULFATE 75 MG/1
75 TABLET ORAL DAILY
Status: DISCONTINUED | OUTPATIENT
Start: 2024-02-20 | End: 2024-02-21 | Stop reason: HOSPADM

## 2024-02-19 RX ORDER — PROTAMINE SULFATE 10 MG/ML
INJECTION, SOLUTION INTRAVENOUS
Status: DISCONTINUED | OUTPATIENT
Start: 2024-02-19 | End: 2024-02-19

## 2024-02-19 RX ORDER — GABAPENTIN 100 MG/1
100 CAPSULE ORAL
Status: DISCONTINUED | OUTPATIENT
Start: 2024-02-19 | End: 2024-02-20

## 2024-02-19 RX ORDER — TRAZODONE HYDROCHLORIDE 50 MG/1
50 TABLET ORAL NIGHTLY PRN
Status: DISCONTINUED | OUTPATIENT
Start: 2024-02-19 | End: 2024-02-21 | Stop reason: HOSPADM

## 2024-02-19 RX ORDER — HYDRALAZINE HYDROCHLORIDE 50 MG/1
50 TABLET, FILM COATED ORAL 3 TIMES DAILY
Status: DISCONTINUED | OUTPATIENT
Start: 2024-02-19 | End: 2024-02-19

## 2024-02-19 RX ORDER — INSULIN ASPART 100 [IU]/ML
0-5 INJECTION, SOLUTION INTRAVENOUS; SUBCUTANEOUS
Status: DISCONTINUED | OUTPATIENT
Start: 2024-02-19 | End: 2024-02-21 | Stop reason: HOSPADM

## 2024-02-19 RX ORDER — FUROSEMIDE 20 MG/1
20 TABLET ORAL DAILY
Status: DISCONTINUED | OUTPATIENT
Start: 2024-02-19 | End: 2024-02-19

## 2024-02-19 RX ORDER — SODIUM CHLORIDE 0.9 % (FLUSH) 0.9 %
10 SYRINGE (ML) INJECTION
Status: DISCONTINUED | OUTPATIENT
Start: 2024-02-19 | End: 2024-02-19

## 2024-02-19 RX ORDER — SODIUM CHLORIDE 9 MG/ML
INJECTION, SOLUTION INTRAVENOUS CONTINUOUS
Status: DISCONTINUED | OUTPATIENT
Start: 2024-02-19 | End: 2024-02-19

## 2024-02-19 RX ORDER — AMIODARONE HYDROCHLORIDE 200 MG/1
200 TABLET ORAL DAILY
Status: DISCONTINUED | OUTPATIENT
Start: 2024-02-20 | End: 2024-02-19

## 2024-02-19 RX ORDER — LIDOCAINE HYDROCHLORIDE 10 MG/ML
1 INJECTION, SOLUTION EPIDURAL; INFILTRATION; INTRACAUDAL; PERINEURAL ONCE AS NEEDED
Status: DISCONTINUED | OUTPATIENT
Start: 2024-02-19 | End: 2024-02-19

## 2024-02-19 RX ORDER — ATORVASTATIN CALCIUM 20 MG/1
80 TABLET, FILM COATED ORAL DAILY
Status: DISCONTINUED | OUTPATIENT
Start: 2024-02-20 | End: 2024-02-21 | Stop reason: HOSPADM

## 2024-02-19 RX ORDER — FUROSEMIDE 80 MG/1
80 TABLET ORAL EVERY MORNING
Status: DISCONTINUED | OUTPATIENT
Start: 2024-02-20 | End: 2024-02-20

## 2024-02-19 RX ORDER — HEPARIN SODIUM 1000 [USP'U]/ML
INJECTION, SOLUTION INTRAVENOUS; SUBCUTANEOUS
Status: DISCONTINUED | OUTPATIENT
Start: 2024-02-19 | End: 2024-02-19

## 2024-02-19 RX ORDER — ONDANSETRON HYDROCHLORIDE 2 MG/ML
INJECTION, SOLUTION INTRAVENOUS
Status: DISCONTINUED | OUTPATIENT
Start: 2024-02-19 | End: 2024-02-19

## 2024-02-19 RX ORDER — LIDOCAINE HYDROCHLORIDE 20 MG/ML
INJECTION, SOLUTION EPIDURAL; INFILTRATION; INTRACAUDAL; PERINEURAL
Status: DISCONTINUED | OUTPATIENT
Start: 2024-02-19 | End: 2024-02-19

## 2024-02-19 RX ORDER — MIDAZOLAM HYDROCHLORIDE 1 MG/ML
INJECTION INTRAMUSCULAR; INTRAVENOUS
Status: DISCONTINUED | OUTPATIENT
Start: 2024-02-19 | End: 2024-02-19

## 2024-02-19 RX ORDER — NITROGLYCERIN 0.4 MG/1
0.4 TABLET SUBLINGUAL EVERY 5 MIN PRN
Status: DISCONTINUED | OUTPATIENT
Start: 2024-02-19 | End: 2024-02-21 | Stop reason: HOSPADM

## 2024-02-19 RX ORDER — DIPHENHYDRAMINE HYDROCHLORIDE 50 MG/ML
INJECTION INTRAMUSCULAR; INTRAVENOUS
Status: DISCONTINUED | OUTPATIENT
Start: 2024-02-19 | End: 2024-02-19

## 2024-02-19 RX ORDER — TACROLIMUS 1 MG/1
3 CAPSULE ORAL 2 TIMES DAILY
Status: DISCONTINUED | OUTPATIENT
Start: 2024-02-19 | End: 2024-02-21 | Stop reason: HOSPADM

## 2024-02-19 RX ORDER — IBUPROFEN 200 MG
16 TABLET ORAL
Status: DISCONTINUED | OUTPATIENT
Start: 2024-02-19 | End: 2024-02-21 | Stop reason: HOSPADM

## 2024-02-19 RX ORDER — FENTANYL CITRATE 50 UG/ML
INJECTION, SOLUTION INTRAMUSCULAR; INTRAVENOUS
Status: DISCONTINUED | OUTPATIENT
Start: 2024-02-19 | End: 2024-02-19

## 2024-02-19 RX ORDER — GLUCAGON 1 MG
1 KIT INJECTION
Status: DISCONTINUED | OUTPATIENT
Start: 2024-02-19 | End: 2024-02-21 | Stop reason: HOSPADM

## 2024-02-19 RX ORDER — CALCIUM ACETATE 667 MG/1
667 CAPSULE ORAL
Status: DISCONTINUED | OUTPATIENT
Start: 2024-02-19 | End: 2024-02-21 | Stop reason: HOSPADM

## 2024-02-19 RX ORDER — PANTOPRAZOLE SODIUM 40 MG/1
40 TABLET, DELAYED RELEASE ORAL DAILY
Status: DISCONTINUED | OUTPATIENT
Start: 2024-02-19 | End: 2024-02-21 | Stop reason: HOSPADM

## 2024-02-19 RX ORDER — ALBUTEROL SULFATE 90 UG/1
2 AEROSOL, METERED RESPIRATORY (INHALATION) EVERY 6 HOURS PRN
Status: DISCONTINUED | OUTPATIENT
Start: 2024-02-19 | End: 2024-02-21 | Stop reason: HOSPADM

## 2024-02-19 RX ORDER — NIFEDIPINE 60 MG/1
60 TABLET, EXTENDED RELEASE ORAL 2 TIMES DAILY
Status: DISCONTINUED | OUTPATIENT
Start: 2024-02-19 | End: 2024-02-19

## 2024-02-19 RX ORDER — ISOSORBIDE MONONITRATE 60 MG/1
120 TABLET, EXTENDED RELEASE ORAL DAILY
Status: DISCONTINUED | OUTPATIENT
Start: 2024-02-19 | End: 2024-02-19

## 2024-02-19 RX ORDER — IBUPROFEN 200 MG
24 TABLET ORAL
Status: DISCONTINUED | OUTPATIENT
Start: 2024-02-19 | End: 2024-02-21 | Stop reason: HOSPADM

## 2024-02-19 RX ADMIN — TIOTROPIUM BROMIDE INHALATION SPRAY 2 PUFF: 1.56 SPRAY, METERED RESPIRATORY (INHALATION) at 04:02

## 2024-02-19 RX ADMIN — FLUTICASONE FUROATE AND VILANTEROL TRIFENATATE 1 PUFF: 100; 25 POWDER RESPIRATORY (INHALATION) at 04:02

## 2024-02-19 RX ADMIN — PANTOPRAZOLE SODIUM 40 MG: 40 TABLET, DELAYED RELEASE ORAL at 05:02

## 2024-02-19 RX ADMIN — APIXABAN 5 MG: 5 TABLET, FILM COATED ORAL at 10:02

## 2024-02-19 RX ADMIN — CALCIUM ACETATE 667 MG: 667 CAPSULE ORAL at 04:02

## 2024-02-19 RX ADMIN — TACROLIMUS 3 MG: 1 CAPSULE ORAL at 05:02

## 2024-02-19 NOTE — H&P
[facility] - Cardiology Stepdown  Cardiac Electrophysiology  History and Physical     Admission Date: 2/19/2024  Code Status: Full Code   Attending Provider: JUANI Melendez III, MD   Principal Problem:Syncope    Subjective:     Chief Complaint:  bradycardia/ ?syncope     HPI:  Mr. Ibrahima Phelps is a 68-year-old gentleman with a PMHx of paroxysmal atrial fibrillation, HFpEF with most recent LVEF 65%, coronary artery disease with PCI in 2006, hypertension, hyperlipidemia, aortic atherosclerosis, glucose intolerance, CKD stage IV with a history of prior renal transplant x2 in 2002 and 2005 with chronic immunosuppressive medication, secondary hyperparathyroidism, diverticulosis, COPD, GERD, hypothyroidism, gout, a history of tobacco use, and obesity. In clinic Dr. Parmar discussed a pacemaker may be required in order to increase antiarrhythmic therapy without causing symptomatic bradycardia if needed. Also discussed option of RFA PVI. He has been doing well maintaining sinus ryhthm on Amio 200 daily and Toprol 12.5 daily. A left sided AV fistula was created in December however there was evidence of stenosis and therefore he presented today for stent placement. At the end of the procedure while patient was still in the lab,  pt became unresponsive, normotensive, transiently bagged and began to respond appropriately. Reportedly HR dropped to 40s but no strips in chart and nothing on telemetry. After episode, patient was lethargic, however arousable, answering questions appropriately based on report.  All vitals within normal limits. When discussed events with patient, he does not recall any of this. Currently remains in sinus bradycardia (50s). While in C[facility] no events on telemetry and no evidence of high grade AV block.   EP consulted for evaluation of pacemaker.            Past Medical History:   Diagnosis Date    Atrial fibrillation     CAD (coronary artery disease) 2006    MI in 2006    CHF (congestive heart failure)  "2017    CKD (chronic kidney disease) stage 3, GFR 30-59 ml/min     Dr. Latif.  in transplanted kidney    COVID-19 2/7/2023    Diverticulosis     Hyperlipidemia     Hypertension     Keloid cicatrix     Metabolic bone disease     Other emphysema 10/1/2019    Pericarditis     S/P kidney transplant 1992    x2 (1992 & 2005),    Thrombocytopenia     Thyroid disease     Tobacco use 09/05/2014       Past Surgical History:   Procedure Laterality Date    AV FISTULA PLACEMENT Left 12/18/2023    Procedure: CREATION, AV FISTULA;  Surgeon: JUANI Melendez III, MD;  Location: [facility] OR 2ND FLR;  Service: Vascular;  Laterality: Left;  LUE AVF creation vs AVG insertion    CARDIOVERSION  04/30/15    CHOLECYSTECTOMY      COLONOSCOPY  April 20, 2011    Diverticulosis, repeat recommended in 3 yrs., repeat colonoscopy 2014 revealed 2 polyps.  He should return in 5 years.    COLONOSCOPY N/A 3/13/2020    Procedure: COLONOSCOPY;  Surgeon: Chon Casper MD;  Location: [facility] (4TH FLR);  Service: Endoscopy;  Laterality: N/A;  ok to hold coumadin x5days-see telephone encounter 2/4/20-tb    COLONOSCOPY N/A 2/4/2021    Procedure: COLONOSCOPY;  Surgeon: Chon Casper MD;  Location: [facility] ENDO (4TH FLR);  Service: Endoscopy;  Laterality: N/A;  Eliquis - per Dr. GAVIN chua to hold x 2 days and "restarted asap"- ERW  last prep "poor", [id number] ordered/ Pt requests Dr. Casper  prep ins. mailed - COVID screening on 2/1/21 PCW- ERW    COLONOSCOPY N/A 3/3/2021    Procedure: COLONOSCOPY;  Surgeon: Chon Casper MD;  Location: [facility] ENDO (4TH FLR);  Service: Endoscopy;  Laterality: N/A;  Patient with his second poor bowel pre and has poor prep today.  If patient not intersted or can't get colonoscopy tomorrow will need constipation bowel prep and will need to restart his Eliquis today.Thanks,Chon Blunt-ok to hold Eliquis 2 days prior      COVID     CORONARY ANGIOPLASTY WITH STENT PLACEMENT  9/13/2006    IRRIGATION AND " DEBRIDEMENT Right 8/30/2023    Procedure: IRRIGATION AND DEBRIDEMENT, RIGHT MIDDLE FINGER;  Surgeon: Martin Larsen MD;  Location: [facility] OR [facility] FLR;  Service: Orthopedics;  Laterality: Right;    KIDNEY TRANSPLANT  2005    PARATHYROIDECTOMY      TREATMENT OF CARDIAC ARRHYTHMIA N/A 3/28/2022    Procedure: CARDIOVERSION;  Surgeon: Migue Blunt MD;  Location: [facility] EP LAB;  Service: Cardiology;  Laterality: N/A;  AF, DCC, MAC, GP, 3 PREP*RODRIGO deferred pt 100% compliant*       Review of patient's allergies indicates:   Allergen Reactions    Ace inhibitors Swelling    Verapamil Other (See Comments)     Other reaction(s): Unknown    Povidone-iodine Itching       No current facility-administered medications on file prior to encounter.     Current Outpatient Medications on File Prior to Encounter   Medication Sig    acetaminophen (TYLENOL) 500 MG tablet Take 1 tablet (500 mg total) by mouth every 6 (six) hours as needed for Pain.    amiodarone (PACERONE) 200 MG Tab Take 1 tablet (200 mg total) by mouth once daily.    atorvastatin (LIPITOR) 40 MG tablet Take 2 tablets (80 mg total) by mouth once daily.    b complex vitamins (B COMPLEX-VITAMIN B12) tablet Take 1 tablet by mouth once daily.    calcium acetate,phosphat bind, (PHOSLO) 667 mg capsule TAKE 1 CAPSULE BY MOUTH THREE TIMES A DAY WITH MEALS AND SNACKS    calcium carbonate (CALCIUM 600 ORAL) Take 1 tablet by mouth 2 (two) times a day.    CHOLECALCIFEROL, VITAMIN D3, ORAL Take 2 tablets by mouth 2 (two) times daily.    clopidogreL (PLAVIX) 75 mg tablet Take 1 tablet (75 mg total) by mouth once daily.    doxazosin (CARDURA) 4 MG tablet Take 1 tablet (4 mg total) by mouth every 12 (twelve) hours.    famotidine (PEPCID) 20 MG tablet Take 1 tablet (20 mg total) by mouth 2 (two) times daily.    furosemide (LASIX) 80 MG tablet Take 1 tablet (80 mg total) by mouth every morning.    hydrALAZINE (APRESOLINE) 50 MG tablet TAKE 1 TABLET (50 MG TOTAL) BY MOUTH 3 (THREE)  TIMES DAILY.    isosorbide mononitrate (IMDUR) 30 MG 24 hr tablet Take 4 tablets (120 mg total) by mouth once daily.    metoprolol succinate (TOPROL-XL) 25 MG 24 hr tablet Take 0.5 tablets (12.5 mg total) by mouth 2 (two) times daily.    multivitamin (THERAGRAN) per tablet Take 1 tablet by mouth Daily.    NIFEdipine (PROCARDIA-XL) 60 MG (OSM) 24 hr tablet Take 1 tablet (60 mg total) by mouth 2 (two) times a day.    potassium chloride (KLOR-CON) 10 MEQ TbSR Take 1 tablet (10 mEq total) by mouth daily as needed (to be taken with lasix).,    tacrolimus (PROGRAF) 1 MG Cap TAKE 3 CAPSULES (3 MG TOTAL) BY MOUTH EVERY 12 (TWELVE) HOURS.    tiotropium bromide (SPIRIVA RESPIMAT) 1.25 mcg/actuation inhaler Inhale 2 puffs into the lungs once daily. Controller. Use this inhaler every day.    traZODone (DESYREL) 50 MG tablet Take 1 tablet (50 mg total) by mouth nightly as needed for Insomnia.    albuterol (VENTOLIN HFA) 90 mcg/actuation inhaler Inhale 2 puffs into the lungs every 6 (six) hours as needed for Wheezing or Shortness of Breath. Rescue    albuterol-ipratropium (DUO-NEB) 2.5 mg-0.5 mg/3 mL nebulizer solution Take 3 mLs by nebulization every 6 (six) hours as needed for Wheezing. Rescue. Can be used in place of albuterol inhaler.    apixaban (ELIQUIS) 5 mg Tab Take 1 tablet (5 mg total) by mouth 2 (two) times daily.    fexofenadine HCl (ALLEGRA ORAL) Take 1 tablet by mouth daily as needed (allergies).    fluticasone furoate-vilanteroL (BREO) 100-25 mcg/dose diskus inhaler Inhale 1 puff into the lungs once daily. Controller. Use this inhaler every day. (Patient not taking: Reported on 2/6/2024)    fluticasone propionate (FLONASE) 50 mcg/actuation nasal spray 1 spray (50 mcg total) by Each Nostril route daily as needed for Allergies.    FLUZONE HIGHDOSE QUAD 23-24  mcg/0.7 mL Syrg     furosemide (LASIX) 20 MG tablet One po QAM, and one po at lunchtime if swelling prn    gabapentin (NEURONTIN) 100 MG capsule Take 1  capsule (100 mg total) by mouth as needed (pain).    HYDROcodone-acetaminophen (NORCO) 5-325 mg per tablet Take 1 tablet by mouth every 6 (six) hours as needed for Pain.    MITIGARE 0.6 mg Cap TAKE 1 CAPSULE BY MOUTH TWICE A DAY AS NEEDED GOUT FLARE UP TO 3 DAYS    nitroGLYCERIN (NITROSTAT) 0.4 MG SL tablet Place 1 tablet (0.4 mg total) under the tongue every 5 (five) minutes as needed for Chest pain. (Patient not taking: Reported on 2024)    omeprazole (PRILOSEC) 20 MG capsule Take 1 capsule (20 mg total) by mouth daily as needed (heartburn).    paricalcitoL (ZEMPLAR) 1 MCG capsule TAKE 1 CAPSULE ON MONDAY, WEDNESDAY AND FRIDAY    [employer] COVID BIVAL,12Y UP,,PF, 30 mcg/0.3 mL injection Inject into the muscle.    pulse oximeter (PULSE OXIMETER) device by Apply Externally route 2 (two) times a day. Use twice daily at 8 AM and 3 PM and record the value in MyChart as directed.    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     Patient is currently retired   Substance and Sexual Activity    Alcohol use: No    Drug use: No    Sexual activity: Yes     Partners: Female     Review of Systems   Cardiovascular:  Positive for syncope. Negative for chest pain, dyspnea on exertion, leg swelling and palpitations.   Respiratory:  Negative for cough and shortness of breath.    All other systems reviewed and  are negative.    Objective:     Vital Signs (Most Recent):  Temp: 97.4 °F (36.3 °C) (02/19/24 1602)  Pulse: (!) 52 (02/19/24 1602)  Resp: 18 (02/19/24 1602)  BP: (!) 146/67 (02/19/24 1602)  SpO2: 98 % (02/19/24 1602) Vital Signs (24h Range):  Temp:  [97.4 °F (36.3 °C)-97.8 °F (36.6 °C)] 97.4 °F (36.3 °C)  Pulse:  [52-80] 52  Resp:  [15-18] 18  SpO2:  [96 %-98 %] 98 %  BP: (127-146)/(63-89) 146/67       Weight: 86.2 kg (190 lb)  Body mass index is 25.07 kg/m².    SpO2: 98 %        Physical Exam  Vitals and nursing note reviewed.   Constitutional:       Appearance: Normal appearance.   Cardiovascular:      Rate and Rhythm: Bradycardia present.      Pulses: Normal pulses.      Heart sounds: Normal heart sounds.   Pulmonary:      Effort: Pulmonary effort is normal.   Musculoskeletal:      Cervical back: Normal range of motion.      Right lower leg: No edema.      Left lower leg: No edema.   Skin:     General: Skin is warm.      Comments: Left AV fistula in place   Neurological:      Mental Status: He is alert.            Significant Labs: All pertinent lab results from the last 24 hours have been reviewed.      Assessment and Plan:     * Syncope  Mr. Ibrahima Phelps is a 68-year-old gentleman with a PMHx of paroxysmal atrial fibrillation, tachy-loan syndrome, HFpEF with most recent LVEF 65%, CAD s/p PCI 2006, HTN, HLD, aortic atherosclerosis, glucose intolerance, CKD stage IV with a history of prior renal transplant x2 in 2002 and 2005 with chronic immunosuppressive medication, secondary hyperparathyroidism, diverticulosis, COPD, GERD, hypothyroidism, gout, a history of tobacco use, and obesity admitted for stent placement after recent left AV fistula placement in Dec 2023 complicated by stenosis. He presented today for stent placement that was successfully placed however after procedure he had a reported episode of brief loss of consciousness where HR dropped into the 40s. Patient normotensive. Lethargic after with  normal vitals. No cardiac strips of evidence provided therefore unclear exact rhythm however suspect several confounding factors that may have contributed this which include pain causing vagal episode or post anaesthesia bradycardia. Telemetry reviewed and he remains in sinus bradycardia (50s) with no evidence of high AV block. Currently there is not indication of pacemaker placement.     He has been doing well and maintaining sinus rhythm with home regimen therefore would resume home medications including Amiodarone 200mg daily and Toprol 12.5mg daily.     Recommend monitoring overnight on telemetry with reintroduction of home medications and can follow up outpatient with Dr. Parmar.             Anuja Valladares MD  Cardiac Electrophysiology  [facility]

## 2024-02-19 NOTE — OP NOTE
"[facility] - Cath Lab  Operative Note     Surgery Date: 2/19/2024      Surgeon(s) and Role:     * JUANI Melendez III, MD - Primary     * Han Acosta MD - Fellow     Assisting Surgeon: None     Pre-op Diagnosis:  Arteriovenous fistula stenosis, subsequent encounter [T82.858D]     Post-op Diagnosis:  Post-Op Diagnosis Codes:     * Arteriovenous fistula stenosis, subsequent encounter [T82.858D]     PROCEDURES:     PTA, L AVF (7x60 cephalic arch, 8 x 80 proximally)  Conscious Sedation     Anesthesia: RN IV Sedation     Operative Findings: >90% stenosis in both locations, < 20% after with much improved thrill     Estimated Blood Loss: 5cc         After the procedure was complete, still in the cath lab,   the pt became unresponsive, BP never low, transiently bagged, began to respond appropriately.   DDx included seizure, protamine reaction, sedation deeper than anticipated, or cardiac event.    Discussed with the wife who states he has had "spells" in the past with a negative w/u    Indications:  68-year-old male with CKD 5, not yet initiated hemodialysis, with a left brachiocephalic AV fistula placed proximally 10 weeks ago that has high-grade distal stenosis.    Procedure:  Patient was brought in the cath lab placed in supine position.  After sterile prep and drape and infiltration with 1% lidocaine the left brachiocephalic AV fistula was accessed with a micropuncture set and a fistulogram performed.  This demonstrated a greater than 90% proximal stenosis and a 2nd more moderate stenosis 3-4 cm away from it, and ultimately what was a very severe stenosis in the proximal cephalic arch.  There was no significant central venous stenosis.    Stiff angled Glidewire wire was placed in a short 6 sheath placed.  The proximal areas of stenosis were dilated with a 8 x 80 Ultraverse balloon.  There was moderate wasting the fully effaced at 8 atmospheres this was held for 2 minutes.  Follow-up fistulogram revealed less than 10% " residual stenosis.    Attention was then towards the proximal cephalic arch stenosis.  This was dilated with a 7 x 60 balloon.  There was very severe wasting that then fully effaced at 7 atmospheres this was held for 2 minutes.  Postprocedure fistulogram showed much brisker flow and a less than 20% residual stenosis.  Clinically the fistula which had been very very pulsatile with little thrill now had a reasonably good thrill.    All catheters and guidewires were removed, and hemostasis was achieved with a Monocryl suture.  In the beginning this procedure the patient had been given 5000 units of heparin because of the quite sluggish flow.  The patient was being given protamine reversal,  and was conversing easily with the RN, but abruptly rolled his eyes back in had some shaking of his arms left greater than right.  Immediately after that he did not respond to aggressive sternal rub.  He was manually bagged, initial blood pressure could not be found but ultimately then was found to be normotensive.  He began to mentate shortly thereafter, did become nauseated and vomited once.  His blood pressures during this time were 110-140 systolic.  His heart rate was in the 50s to 60s.    Ultimately, it was unclear the cause of this event.  It is abrupt nature would be unusual for a protamine reaction, and also would be unusual for someone that was over sedated given given that he had a very abrupt event.  Clinically this seemed perhaps most consistent with a seizure.    We will plan on a full set of labs EKG troponins and ultimately medical evaluation for other etiologies including seizure.    INDIGO Melendez III, MD, FACS  Professor and Chief, Vascular and Endovascular Surgery

## 2024-02-19 NOTE — SUBJECTIVE & OBJECTIVE
"Past Medical History:   Diagnosis Date    Atrial fibrillation     CAD (coronary artery disease) 2006    MI in 2006    CHF (congestive heart failure) 2017    CKD (chronic kidney disease) stage 3, GFR 30-59 ml/min     Dr. Latif.  in transplanted kidney    COVID-19 2/7/2023    Diverticulosis     Hyperlipidemia     Hypertension     Keloid cicatrix     Metabolic bone disease     Other emphysema 10/1/2019    Pericarditis     S/P kidney transplant 1992    x2 (1992 & 2005),    Thrombocytopenia     Thyroid disease     Tobacco use 09/05/2014       Past Surgical History:   Procedure Laterality Date    AV FISTULA PLACEMENT Left 12/18/2023    Procedure: CREATION, AV FISTULA;  Surgeon: JUANI Melendez III, MD;  Location: [facility] OR 2ND FLR;  Service: Vascular;  Laterality: Left;  LUE AVF creation vs AVG insertion    CARDIOVERSION  04/30/15    CHOLECYSTECTOMY      COLONOSCOPY  April 20, 2011    Diverticulosis, repeat recommended in 3 yrs., repeat colonoscopy 2014 revealed 2 polyps.  He should return in 5 years.    COLONOSCOPY N/A 3/13/2020    Procedure: COLONOSCOPY;  Surgeon: Chon Casper MD;  Location: [facility] (4TH FLR);  Service: Endoscopy;  Laterality: N/A;  ok to hold coumadin x5days-see telephone encounter 2/4/20-tb    COLONOSCOPY N/A 2/4/2021    Procedure: COLONOSCOPY;  Surgeon: Chon Casper MD;  Location: [facility] (4TH FLR);  Service: Endoscopy;  Laterality: N/A;  Eliquis - per Dr. GAVIN Blunt ok to hold x 2 days and "restarted asap"- ERW  last prep "poor", [id number] ordered/ Pt requests Dr. Casper  prep ins. mailed - COVID screening on 2/1/21 PCW- ERW    COLONOSCOPY N/A 3/3/2021    Procedure: COLONOSCOPY;  Surgeon: Chon Casper MD;  Location: [facility] (4TH FLR);  Service: Endoscopy;  Laterality: N/A;  Patient with his second poor bowel pre and has poor prep today.  If patient not intersted or can't get colonoscopy tomorrow will need constipation bowel prep and will need to restart his Eliquis " today.Thanks,Chon     per Dr Lopez to hold Eliquis 2 days prior      COVID     CORONARY ANGIOPLASTY WITH STENT PLACEMENT  9/13/2006    IRRIGATION AND DEBRIDEMENT Right 8/30/2023    Procedure: IRRIGATION AND DEBRIDEMENT, RIGHT MIDDLE FINGER;  Surgeon: Martin Larsen MD;  Location: [facility] OR [facility] FLR;  Service: Orthopedics;  Laterality: Right;    KIDNEY TRANSPLANT  2005    PARATHYROIDECTOMY      TREATMENT OF CARDIAC ARRHYTHMIA N/A 3/28/2022    Procedure: CARDIOVERSION;  Surgeon: Migue Blunt MD;  Location: [facility] EP LAB;  Service: Cardiology;  Laterality: N/A;  AF, DCC, MAC, GP, 3 PREP*RODRIGO deferred pt 100% compliant*       Review of patient's allergies indicates:   Allergen Reactions    Ace inhibitors Swelling    Verapamil Other (See Comments)     Other reaction(s): Unknown    Povidone-iodine Itching       No current facility-administered medications on file prior to encounter.     Current Outpatient Medications on File Prior to Encounter   Medication Sig    acetaminophen (TYLENOL) 500 MG tablet Take 1 tablet (500 mg total) by mouth every 6 (six) hours as needed for Pain.    amiodarone (PACERONE) 200 MG Tab Take 1 tablet (200 mg total) by mouth once daily.    atorvastatin (LIPITOR) 40 MG tablet Take 2 tablets (80 mg total) by mouth once daily.    b complex vitamins (B COMPLEX-VITAMIN B12) tablet Take 1 tablet by mouth once daily.    calcium acetate,phosphat bind, (PHOSLO) 667 mg capsule TAKE 1 CAPSULE BY MOUTH THREE TIMES A DAY WITH MEALS AND SNACKS    calcium carbonate (CALCIUM 600 ORAL) Take 1 tablet by mouth 2 (two) times a day.    CHOLECALCIFEROL, VITAMIN D3, ORAL Take 2 tablets by mouth 2 (two) times daily.    clopidogreL (PLAVIX) 75 mg tablet Take 1 tablet (75 mg total) by mouth once daily.    doxazosin (CARDURA) 4 MG tablet Take 1 tablet (4 mg total) by mouth every 12 (twelve) hours.    famotidine (PEPCID) 20 MG tablet Take 1 tablet (20 mg total) by mouth 2 (two) times daily.    furosemide (LASIX)  80 MG tablet Take 1 tablet (80 mg total) by mouth every morning.    hydrALAZINE (APRESOLINE) 50 MG tablet TAKE 1 TABLET (50 MG TOTAL) BY MOUTH 3 (THREE) TIMES DAILY.    isosorbide mononitrate (IMDUR) 30 MG 24 hr tablet Take 4 tablets (120 mg total) by mouth once daily.    metoprolol succinate (TOPROL-XL) 25 MG 24 hr tablet Take 0.5 tablets (12.5 mg total) by mouth 2 (two) times daily.    multivitamin (THERAGRAN) per tablet Take 1 tablet by mouth Daily.    NIFEdipine (PROCARDIA-XL) 60 MG (OSM) 24 hr tablet Take 1 tablet (60 mg total) by mouth 2 (two) times a day.    potassium chloride (KLOR-CON) 10 MEQ TbSR Take 1 tablet (10 mEq total) by mouth daily as needed (to be taken with lasix).,    tacrolimus (PROGRAF) 1 MG Cap TAKE 3 CAPSULES (3 MG TOTAL) BY MOUTH EVERY 12 (TWELVE) HOURS.    tiotropium bromide (SPIRIVA RESPIMAT) 1.25 mcg/actuation inhaler Inhale 2 puffs into the lungs once daily. Controller. Use this inhaler every day.    traZODone (DESYREL) 50 MG tablet Take 1 tablet (50 mg total) by mouth nightly as needed for Insomnia.    albuterol (VENTOLIN HFA) 90 mcg/actuation inhaler Inhale 2 puffs into the lungs every 6 (six) hours as needed for Wheezing or Shortness of Breath. Rescue    albuterol-ipratropium (DUO-NEB) 2.5 mg-0.5 mg/3 mL nebulizer solution Take 3 mLs by nebulization every 6 (six) hours as needed for Wheezing. Rescue. Can be used in place of albuterol inhaler.    apixaban (ELIQUIS) 5 mg Tab Take 1 tablet (5 mg total) by mouth 2 (two) times daily.    fexofenadine HCl (ALLEGRA ORAL) Take 1 tablet by mouth daily as needed (allergies).    fluticasone furoate-vilanteroL (BREO) 100-25 mcg/dose diskus inhaler Inhale 1 puff into the lungs once daily. Controller. Use this inhaler every day. (Patient not taking: Reported on 2/6/2024)    fluticasone propionate (FLONASE) 50 mcg/actuation nasal spray 1 spray (50 mcg total) by Each Nostril route daily as needed for Allergies.    FLUZONE HIGHDOSE QUAD 23-24   mcg/0.7 mL Syrg     furosemide (LASIX) 20 MG tablet One po QAM, and one po at lunchtime if swelling prn    gabapentin (NEURONTIN) 100 MG capsule Take 1 capsule (100 mg total) by mouth as needed (pain).    HYDROcodone-acetaminophen (NORCO) 5-325 mg per tablet Take 1 tablet by mouth every 6 (six) hours as needed for Pain.    MITIGARE 0.6 mg Cap TAKE 1 CAPSULE BY MOUTH TWICE A DAY AS NEEDED GOUT FLARE UP TO 3 DAYS    nitroGLYCERIN (NITROSTAT) 0.4 MG SL tablet Place 1 tablet (0.4 mg total) under the tongue every 5 (five) minutes as needed for Chest pain. (Patient not taking: Reported on 2024)    omeprazole (PRILOSEC) 20 MG capsule Take 1 capsule (20 mg total) by mouth daily as needed (heartburn).    paricalcitoL (ZEMPLAR) 1 MCG capsule TAKE 1 CAPSULE ON MONDAY, WEDNESDAY AND FRIDAY    Arteris COVID BIVAL,12Y UP,,PF, 30 mcg/0.3 mL injection Inject into the muscle.    pulse oximeter (PULSE OXIMETER) device by Apply Externally route 2 (two) times a day. Use twice daily at 8 AM and 3 PM and record the value in [email or web address] as directed.    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     Patient is currently retired   Substance and Sexual Activity    Alcohol use: No    Drug use: No    Sexual activity: Yes     Partners: Female     Review of Systems   Cardiovascular:  Positive for syncope. Negative for chest  pain, dyspnea on exertion, leg swelling and palpitations.   Respiratory:  Negative for cough and shortness of breath.    All other systems reviewed and are negative.    Objective:     Vital Signs (Most Recent):  Temp: 97.4 °F (36.3 °C) (02/19/24 1602)  Pulse: (!) 52 (02/19/24 1602)  Resp: 18 (02/19/24 1602)  BP: (!) 146/67 (02/19/24 1602)  SpO2: 98 % (02/19/24 1602) Vital Signs (24h Range):  Temp:  [97.4 °F (36.3 °C)-97.8 °F (36.6 °C)] 97.4 °F (36.3 °C)  Pulse:  [52-80] 52  Resp:  [15-18] 18  SpO2:  [96 %-98 %] 98 %  BP: (127-146)/(63-89) 146/67       Weight: 86.2 kg (190 lb)  Body mass index is 25.07 kg/m².    SpO2: 98 %        Physical Exam  Vitals and nursing note reviewed.   Constitutional:       Appearance: Normal appearance.   Cardiovascular:      Rate and Rhythm: Bradycardia present.      Pulses: Normal pulses.      Heart sounds: Normal heart sounds.   Pulmonary:      Effort: Pulmonary effort is normal.   Musculoskeletal:      Cervical back: Normal range of motion.      Right lower leg: No edema.      Left lower leg: No edema.   Skin:     General: Skin is warm.      Comments: Left AV fistula in place   Neurological:      Mental Status: He is alert.            Significant Labs: All pertinent lab results from the last 24 hours have been reviewed.

## 2024-02-19 NOTE — CONSULTS
[facility] - Cardiology Stepdown  Cardiac Electrophysiology  Consult Note    Admission Date: 2/19/2024  Code Status: Full Code   Attending Provider: JUANI Melendez III, MD  Consulting Provider: Anuja Valladares MD  Principal Problem:Syncope    Inpatient consult to Electrophysiology  Consult performed by: Anuja Valladares MD  Consult ordered by: Mary Zimmer MD  Reason for consult: SYNCOPE        Assessment and Plan:     * Syncope  Mr. Ibrahima Phelps is a 68-year-old gentleman with a PMHx of paroxysmal atrial fibrillation, tachy-loan syndrome, HFpEF with most recent LVEF 65%, CAD s/p PCI 2006, HTN, HLD, aortic atherosclerosis, glucose intolerance, CKD stage IV with a history of prior renal transplant x2 in 2002 and 2005 with chronic immunosuppressive medication, secondary hyperparathyroidism, diverticulosis, COPD, GERD, hypothyroidism, gout, a history of tobacco use, and obesity admitted for stent placement after recent left AV fistula placement in Dec 2023 complicated by stenosis. He presented today for stent placement that was successfully placed however after procedure he had a reported episode of brief loss of consciousness where HR dropped into the 40s. Patient normotensive. Lethargic after with normal vitals. No cardiac strips of evidence provided, therefore, unclear exact rhythm however suspect several confounding factors that may have contributed this which include pain causing vagal episode or post anaesthesia bradycardia. Telemetry reviewed and he remains in sinus bradycardia (50s) with no evidence of high AV block. Currently there is no indication of pacemaker placement.     He has been doing well and maintaining sinus rhythm with home regimen therefore would resume home medications including Amiodarone 200mg daily and Toprol 12.5mg daily.     Recommend monitoring overnight on telemetry with reintroduction of home medications and can follow up outpatient with Dr. Parmar.           Thank you  for your consult. I will sign off. Please contact us if you have any additional questions.    Anuja Valladares MD  Cardiac Electrophysiology  [facility] - Cardiology Stepdown    Subjective:     Chief Complaint:  bradycardia/ ?syncope     HPI:   Mr. Ibrahima Phelps is a 68-year-old gentleman with a PMHx of paroxysmal atrial fibrillation, HFpEF with most recent LVEF 65%, coronary artery disease with PCI in 2006, hypertension, hyperlipidemia, aortic atherosclerosis, glucose intolerance, CKD stage IV with a history of prior renal transplant x2 in 2002 and 2005 with chronic immunosuppressive medication, secondary hyperparathyroidism, diverticulosis, COPD, GERD, hypothyroidism, gout, a history of tobacco use, and obesity. In clinic Dr. Parmar discussed a pacemaker may be required in order to increase antiarrhythmic therapy without causing symptomatic bradycardia if needed. Also discussed option of RFA PVI. He has been doing well maintaining sinus ryhthm on Amio 200 daily and Toprol 12.5 daily. A left sided AV fistula was created in December however there was evidence of stenosis and therefore he presented today for stent placement. At the end of the procedure while patient was still in the lab,  pt became unresponsive, normotensive, transiently bagged and began to respond appropriately. Reportedly HR dropped to 40s but no strips in chart and nothing on telemetry. After episode, patient was lethargic, however arousable, answering questions appropriately based on report.  All vitals within normal limits. When discussed events with patient, he does not recall any of this. Currently remains in sinus bradycardia (50s). While in C[facility] no events on telemetry and no evidence of high grade AV block.   EP consulted for evaluation of pacemaker.            Past Medical History:   Diagnosis Date    Atrial fibrillation     CAD (coronary artery disease) 2006    MI in 2006    CHF (congestive heart failure) 2017    CKD (chronic kidney  "disease) stage 3, GFR 30-59 ml/min     Dr. Latif.  in transplanted kidney    COVID-19 2/7/2023    Diverticulosis     Hyperlipidemia     Hypertension     Keloid cicatrix     Metabolic bone disease     Other emphysema 10/1/2019    Pericarditis     S/P kidney transplant 1992    x2 (1992 & 2005),    Thrombocytopenia     Thyroid disease     Tobacco use 09/05/2014       Past Surgical History:   Procedure Laterality Date    AV FISTULA PLACEMENT Left 12/18/2023    Procedure: CREATION, AV FISTULA;  Surgeon: JUANI Melendez III, MD;  Location: [facility] OR 2ND FLR;  Service: Vascular;  Laterality: Left;  LUE AVF creation vs AVG insertion    CARDIOVERSION  04/30/15    CHOLECYSTECTOMY      COLONOSCOPY  April 20, 2011    Diverticulosis, repeat recommended in 3 yrs., repeat colonoscopy 2014 revealed 2 polyps.  He should return in 5 years.    COLONOSCOPY N/A 3/13/2020    Procedure: COLONOSCOPY;  Surgeon: Chon Casper MD;  Location: [facility] (4TH FLR);  Service: Endoscopy;  Laterality: N/A;  ok to hold coumadin x5days-see telephone encounter 2/4/20-tb    COLONOSCOPY N/A 2/4/2021    Procedure: COLONOSCOPY;  Surgeon: Chon Casper MD;  Location: [facility] (4TH FLR);  Service: Endoscopy;  Laterality: N/A;  Eliquis - per Dr. GAVIN chua to hold x 2 days and "restarted asap"- ERW  last prep "poor", [id number] ordered/ Pt requests Dr. Casper  prep ins. mailed - COVID screening on 2/1/21 PCW- ERW    COLONOSCOPY N/A 3/3/2021    Procedure: COLONOSCOPY;  Surgeon: Chon Casper MD;  Location: [facility] (4TH FLR);  Service: Endoscopy;  Laterality: N/A;  Patient with his second poor bowel pre and has poor prep today.  If patient not intersted or can't get colonoscopy tomorrow will need constipation bowel prep and will need to restart his Eliquis today.Thanks,Chon Blunt-ok to hold Eliquis 2 days prior      COVID     CORONARY ANGIOPLASTY WITH STENT PLACEMENT  9/13/2006    IRRIGATION AND DEBRIDEMENT Right 8/30/2023    " Procedure: IRRIGATION AND DEBRIDEMENT, RIGHT MIDDLE FINGER;  Surgeon: Martin Larsen MD;  Location: [facility] OR [facility] FLR;  Service: Orthopedics;  Laterality: Right;    KIDNEY TRANSPLANT  2005    PARATHYROIDECTOMY      TREATMENT OF CARDIAC ARRHYTHMIA N/A 3/28/2022    Procedure: CARDIOVERSION;  Surgeon: Migue Blunt MD;  Location: [facility] EP LAB;  Service: Cardiology;  Laterality: N/A;  AF, DCC, MAC, GP, 3 PREP*RODRIGO deferred pt 100% compliant*       Review of patient's allergies indicates:   Allergen Reactions    Ace inhibitors Swelling    Verapamil Other (See Comments)     Other reaction(s): Unknown    Povidone-iodine Itching       No current facility-administered medications on file prior to encounter.     Current Outpatient Medications on File Prior to Encounter   Medication Sig    acetaminophen (TYLENOL) 500 MG tablet Take 1 tablet (500 mg total) by mouth every 6 (six) hours as needed for Pain.    amiodarone (PACERONE) 200 MG Tab Take 1 tablet (200 mg total) by mouth once daily.    atorvastatin (LIPITOR) 40 MG tablet Take 2 tablets (80 mg total) by mouth once daily.    b complex vitamins (B COMPLEX-VITAMIN B12) tablet Take 1 tablet by mouth once daily.    calcium acetate,phosphat bind, (PHOSLO) 667 mg capsule TAKE 1 CAPSULE BY MOUTH THREE TIMES A DAY WITH MEALS AND SNACKS    calcium carbonate (CALCIUM 600 ORAL) Take 1 tablet by mouth 2 (two) times a day.    CHOLECALCIFEROL, VITAMIN D3, ORAL Take 2 tablets by mouth 2 (two) times daily.    clopidogreL (PLAVIX) 75 mg tablet Take 1 tablet (75 mg total) by mouth once daily.    doxazosin (CARDURA) 4 MG tablet Take 1 tablet (4 mg total) by mouth every 12 (twelve) hours.    famotidine (PEPCID) 20 MG tablet Take 1 tablet (20 mg total) by mouth 2 (two) times daily.    furosemide (LASIX) 80 MG tablet Take 1 tablet (80 mg total) by mouth every morning.    hydrALAZINE (APRESOLINE) 50 MG tablet TAKE 1 TABLET (50 MG TOTAL) BY MOUTH 3 (THREE) TIMES DAILY.    isosorbide  mononitrate (IMDUR) 30 MG 24 hr tablet Take 4 tablets (120 mg total) by mouth once daily.    metoprolol succinate (TOPROL-XL) 25 MG 24 hr tablet Take 0.5 tablets (12.5 mg total) by mouth 2 (two) times daily.    multivitamin (THERAGRAN) per tablet Take 1 tablet by mouth Daily.    NIFEdipine (PROCARDIA-XL) 60 MG (OSM) 24 hr tablet Take 1 tablet (60 mg total) by mouth 2 (two) times a day.    potassium chloride (KLOR-CON) 10 MEQ TbSR Take 1 tablet (10 mEq total) by mouth daily as needed (to be taken with lasix).,    tacrolimus (PROGRAF) 1 MG Cap TAKE 3 CAPSULES (3 MG TOTAL) BY MOUTH EVERY 12 (TWELVE) HOURS.    tiotropium bromide (SPIRIVA RESPIMAT) 1.25 mcg/actuation inhaler Inhale 2 puffs into the lungs once daily. Controller. Use this inhaler every day.    traZODone (DESYREL) 50 MG tablet Take 1 tablet (50 mg total) by mouth nightly as needed for Insomnia.    albuterol (VENTOLIN HFA) 90 mcg/actuation inhaler Inhale 2 puffs into the lungs every 6 (six) hours as needed for Wheezing or Shortness of Breath. Rescue    albuterol-ipratropium (DUO-NEB) 2.5 mg-0.5 mg/3 mL nebulizer solution Take 3 mLs by nebulization every 6 (six) hours as needed for Wheezing. Rescue. Can be used in place of albuterol inhaler.    apixaban (ELIQUIS) 5 mg Tab Take 1 tablet (5 mg total) by mouth 2 (two) times daily.    fexofenadine HCl (ALLEGRA ORAL) Take 1 tablet by mouth daily as needed (allergies).    fluticasone furoate-vilanteroL (BREO) 100-25 mcg/dose diskus inhaler Inhale 1 puff into the lungs once daily. Controller. Use this inhaler every day. (Patient not taking: Reported on 2/6/2024)    fluticasone propionate (FLONASE) 50 mcg/actuation nasal spray 1 spray (50 mcg total) by Each Nostril route daily as needed for Allergies.    FLUZONE HIGHDOSE QUAD 23-24  mcg/0.7 mL Syrg     furosemide (LASIX) 20 MG tablet One po QAM, and one po at lunchtime if swelling prn    gabapentin (NEURONTIN) 100 MG capsule Take 1 capsule (100 mg total) by  mouth as needed (pain).    HYDROcodone-acetaminophen (NORCO) 5-325 mg per tablet Take 1 tablet by mouth every 6 (six) hours as needed for Pain.    MITIGARE 0.6 mg Cap TAKE 1 CAPSULE BY MOUTH TWICE A DAY AS NEEDED GOUT FLARE UP TO 3 DAYS    nitroGLYCERIN (NITROSTAT) 0.4 MG SL tablet Place 1 tablet (0.4 mg total) under the tongue every 5 (five) minutes as needed for Chest pain. (Patient not taking: Reported on 2024)    omeprazole (PRILOSEC) 20 MG capsule Take 1 capsule (20 mg total) by mouth daily as needed (heartburn).    paricalcitoL (ZEMPLAR) 1 MCG capsule TAKE 1 CAPSULE ON MONDAY, WEDNESDAY AND FRIDAY    Friendshippr COVID BIVAL,12Y UP,,PF, 30 mcg/0.3 mL injection Inject into the muscle.    pulse oximeter (PULSE OXIMETER) device by Apply Externally route 2 (two) times a day. Use twice daily at 8 AM and 3 PM and record the value in MyChart as directed.    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     Patient is currently retired   Substance and Sexual Activity    Alcohol use: No    Drug use: No    Sexual activity: Yes     Partners: Female     Review of Systems   Cardiovascular:  Positive for syncope. Negative for chest pain, dyspnea on exertion, leg swelling and palpitations.   Respiratory:  Negative for cough and shortness of breath.    All other systems reviewed and are  negative.    Objective:     Vital Signs (Most Recent):  Temp: 97.4 °F (36.3 °C) (02/19/24 1602)  Pulse: (!) 55 (02/19/24 1655)  Resp: 18 (02/19/24 1655)  BP: (!) 146/67 (02/19/24 1602)  SpO2: 98 % (02/19/24 1602) Vital Signs (24h Range):  Temp:  [97.4 °F (36.3 °C)-97.8 °F (36.6 °C)] 97.4 °F (36.3 °C)  Pulse:  [52-80] 55  Resp:  [15-18] 18  SpO2:  [96 %-98 %] 98 %  BP: (127-146)/(63-89) 146/67       Weight: 86.2 kg (190 lb)  Body mass index is 25.07 kg/m².    SpO2: 98 %        Physical Exam  Vitals and nursing note reviewed.   Constitutional:       Appearance: Normal appearance.   Cardiovascular:      Rate and Rhythm: Bradycardia present.      Pulses: Normal pulses.      Heart sounds: Normal heart sounds.   Pulmonary:      Effort: Pulmonary effort is normal.   Musculoskeletal:      Cervical back: Normal range of motion.      Right lower leg: No edema.      Left lower leg: No edema.   Skin:     General: Skin is warm.      Comments: Left AV fistula in place   Neurological:      Mental Status: He is alert.            Significant Labs: All pertinent lab results from the last 24 hours have been reviewed.

## 2024-02-19 NOTE — Clinical Note
The balloon was inserted into the left AV fistula. Balloon inflated and removed following angioplasty.

## 2024-02-19 NOTE — PLAN OF CARE
Pt arrived to unit accompanied by his wife.  Pre op orders and assessment initiated.  Pt remains npo. Dr. Matute notified of pt's iodine allergy.   Call bell within reach.

## 2024-02-19 NOTE — SUBJECTIVE & OBJECTIVE
"Past Medical History:   Diagnosis Date    Atrial fibrillation     CAD (coronary artery disease) 2006    MI in 2006    CHF (congestive heart failure) 2017    CKD (chronic kidney disease) stage 3, GFR 30-59 ml/min     Dr. Latif.  in transplanted kidney    COVID-19 2/7/2023    Diverticulosis     Hyperlipidemia     Hypertension     Keloid cicatrix     Metabolic bone disease     Other emphysema 10/1/2019    Pericarditis     S/P kidney transplant 1992    x2 (1992 & 2005),    Thrombocytopenia     Thyroid disease     Tobacco use 09/05/2014       Past Surgical History:   Procedure Laterality Date    AV FISTULA PLACEMENT Left 12/18/2023    Procedure: CREATION, AV FISTULA;  Surgeon: JUANI Melendez III, MD;  Location: [facility] OR 2ND FLR;  Service: Vascular;  Laterality: Left;  LUE AVF creation vs AVG insertion    CARDIOVERSION  04/30/15    CHOLECYSTECTOMY      COLONOSCOPY  April 20, 2011    Diverticulosis, repeat recommended in 3 yrs., repeat colonoscopy 2014 revealed 2 polyps.  He should return in 5 years.    COLONOSCOPY N/A 3/13/2020    Procedure: COLONOSCOPY;  Surgeon: Chon Casper MD;  Location: [facility] (4TH FLR);  Service: Endoscopy;  Laterality: N/A;  ok to hold coumadin x5days-see telephone encounter 2/4/20-tb    COLONOSCOPY N/A 2/4/2021    Procedure: COLONOSCOPY;  Surgeon: Chon Casper MD;  Location: [facility] (4TH FLR);  Service: Endoscopy;  Laterality: N/A;  Eliquis - per Dr. GAVIN Blunt ok to hold x 2 days and "restarted asap"- ERW  last prep "poor", [id number] ordered/ Pt requests Dr. Casper  prep ins. mailed - COVID screening on 2/1/21 PCW- ERW    COLONOSCOPY N/A 3/3/2021    Procedure: COLONOSCOPY;  Surgeon: Chon Casper MD;  Location: [facility] (4TH FLR);  Service: Endoscopy;  Laterality: N/A;  Patient with his second poor bowel pre and has poor prep today.  If patient not intersted or can't get colonoscopy tomorrow will need constipation bowel prep and will need to restart his Eliquis " today.Thanks,Chon     per Dr Lopez to hold Eliquis 2 days prior      COVID     CORONARY ANGIOPLASTY WITH STENT PLACEMENT  9/13/2006    IRRIGATION AND DEBRIDEMENT Right 8/30/2023    Procedure: IRRIGATION AND DEBRIDEMENT, RIGHT MIDDLE FINGER;  Surgeon: Martin Larsen MD;  Location: [facility] OR [facility] FLR;  Service: Orthopedics;  Laterality: Right;    KIDNEY TRANSPLANT  2005    PARATHYROIDECTOMY      TREATMENT OF CARDIAC ARRHYTHMIA N/A 3/28/2022    Procedure: CARDIOVERSION;  Surgeon: Migue Blunt MD;  Location: [facility] EP LAB;  Service: Cardiology;  Laterality: N/A;  AF, DCC, MAC, GP, 3 PREP*RODRIGO deferred pt 100% compliant*       Review of patient's allergies indicates:   Allergen Reactions    Ace inhibitors Swelling    Verapamil Other (See Comments)     Other reaction(s): Unknown    Povidone-iodine Itching       No current facility-administered medications on file prior to encounter.     Current Outpatient Medications on File Prior to Encounter   Medication Sig    acetaminophen (TYLENOL) 500 MG tablet Take 1 tablet (500 mg total) by mouth every 6 (six) hours as needed for Pain.    amiodarone (PACERONE) 200 MG Tab Take 1 tablet (200 mg total) by mouth once daily.    atorvastatin (LIPITOR) 40 MG tablet Take 2 tablets (80 mg total) by mouth once daily.    b complex vitamins (B COMPLEX-VITAMIN B12) tablet Take 1 tablet by mouth once daily.    calcium acetate,phosphat bind, (PHOSLO) 667 mg capsule TAKE 1 CAPSULE BY MOUTH THREE TIMES A DAY WITH MEALS AND SNACKS    calcium carbonate (CALCIUM 600 ORAL) Take 1 tablet by mouth 2 (two) times a day.    CHOLECALCIFEROL, VITAMIN D3, ORAL Take 2 tablets by mouth 2 (two) times daily.    clopidogreL (PLAVIX) 75 mg tablet Take 1 tablet (75 mg total) by mouth once daily.    doxazosin (CARDURA) 4 MG tablet Take 1 tablet (4 mg total) by mouth every 12 (twelve) hours.    famotidine (PEPCID) 20 MG tablet Take 1 tablet (20 mg total) by mouth 2 (two) times daily.    furosemide (LASIX)  80 MG tablet Take 1 tablet (80 mg total) by mouth every morning.    hydrALAZINE (APRESOLINE) 50 MG tablet TAKE 1 TABLET (50 MG TOTAL) BY MOUTH 3 (THREE) TIMES DAILY.    isosorbide mononitrate (IMDUR) 30 MG 24 hr tablet Take 4 tablets (120 mg total) by mouth once daily.    metoprolol succinate (TOPROL-XL) 25 MG 24 hr tablet Take 0.5 tablets (12.5 mg total) by mouth 2 (two) times daily.    multivitamin (THERAGRAN) per tablet Take 1 tablet by mouth Daily.    NIFEdipine (PROCARDIA-XL) 60 MG (OSM) 24 hr tablet Take 1 tablet (60 mg total) by mouth 2 (two) times a day.    potassium chloride (KLOR-CON) 10 MEQ TbSR Take 1 tablet (10 mEq total) by mouth daily as needed (to be taken with lasix).,    tacrolimus (PROGRAF) 1 MG Cap TAKE 3 CAPSULES (3 MG TOTAL) BY MOUTH EVERY 12 (TWELVE) HOURS.    tiotropium bromide (SPIRIVA RESPIMAT) 1.25 mcg/actuation inhaler Inhale 2 puffs into the lungs once daily. Controller. Use this inhaler every day.    traZODone (DESYREL) 50 MG tablet Take 1 tablet (50 mg total) by mouth nightly as needed for Insomnia.    albuterol (VENTOLIN HFA) 90 mcg/actuation inhaler Inhale 2 puffs into the lungs every 6 (six) hours as needed for Wheezing or Shortness of Breath. Rescue    albuterol-ipratropium (DUO-NEB) 2.5 mg-0.5 mg/3 mL nebulizer solution Take 3 mLs by nebulization every 6 (six) hours as needed for Wheezing. Rescue. Can be used in place of albuterol inhaler.    apixaban (ELIQUIS) 5 mg Tab Take 1 tablet (5 mg total) by mouth 2 (two) times daily.    fexofenadine HCl (ALLEGRA ORAL) Take 1 tablet by mouth daily as needed (allergies).    fluticasone furoate-vilanteroL (BREO) 100-25 mcg/dose diskus inhaler Inhale 1 puff into the lungs once daily. Controller. Use this inhaler every day. (Patient not taking: Reported on 2/6/2024)    fluticasone propionate (FLONASE) 50 mcg/actuation nasal spray 1 spray (50 mcg total) by Each Nostril route daily as needed for Allergies.    FLUZONE HIGHDOSE QUAD 23-24   mcg/0.7 mL Syrg     furosemide (LASIX) 20 MG tablet One po QAM, and one po at lunchtime if swelling prn    gabapentin (NEURONTIN) 100 MG capsule Take 1 capsule (100 mg total) by mouth as needed (pain).    HYDROcodone-acetaminophen (NORCO) 5-325 mg per tablet Take 1 tablet by mouth every 6 (six) hours as needed for Pain.    MITIGARE 0.6 mg Cap TAKE 1 CAPSULE BY MOUTH TWICE A DAY AS NEEDED GOUT FLARE UP TO 3 DAYS    nitroGLYCERIN (NITROSTAT) 0.4 MG SL tablet Place 1 tablet (0.4 mg total) under the tongue every 5 (five) minutes as needed for Chest pain. (Patient not taking: Reported on 2024)    omeprazole (PRILOSEC) 20 MG capsule Take 1 capsule (20 mg total) by mouth daily as needed (heartburn).    paricalcitoL (ZEMPLAR) 1 MCG capsule TAKE 1 CAPSULE ON MONDAY, WEDNESDAY AND FRIDAY    [employer] COVID BIVAL,12Y UP,,PF, 30 mcg/0.3 mL injection Inject into the muscle.    pulse oximeter (PULSE OXIMETER) device by Apply Externally route 2 (two) times a day. Use twice daily at 8 AM and 3 PM and record the value in [employer] as directed.    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     Patient is currently retired   Substance and Sexual Activity    Alcohol use: No    Drug use: No    Sexual activity: Yes     Partners: Female     Review of Systems   Cardiovascular:  Positive for syncope. Negative for chest  pain, dyspnea on exertion, leg swelling and palpitations.   Respiratory:  Negative for cough and shortness of breath.    All other systems reviewed and are negative.    Objective:     Vital Signs (Most Recent):  Temp: 97.4 °F (36.3 °C) (02/19/24 1602)  Pulse: (!) 55 (02/19/24 1655)  Resp: 18 (02/19/24 1655)  BP: (!) 146/67 (02/19/24 1602)  SpO2: 98 % (02/19/24 1602) Vital Signs (24h Range):  Temp:  [97.4 °F (36.3 °C)-97.8 °F (36.6 °C)] 97.4 °F (36.3 °C)  Pulse:  [52-80] 55  Resp:  [15-18] 18  SpO2:  [96 %-98 %] 98 %  BP: (127-146)/(63-89) 146/67       Weight: 86.2 kg (190 lb)  Body mass index is 25.07 kg/m².    SpO2: 98 %        Physical Exam  Vitals and nursing note reviewed.   Constitutional:       Appearance: Normal appearance.   Cardiovascular:      Rate and Rhythm: Bradycardia present.      Pulses: Normal pulses.      Heart sounds: Normal heart sounds.   Pulmonary:      Effort: Pulmonary effort is normal.   Musculoskeletal:      Cervical back: Normal range of motion.      Right lower leg: No edema.      Left lower leg: No edema.   Skin:     General: Skin is warm.      Comments: Left AV fistula in place   Neurological:      Mental Status: He is alert.            Significant Labs: All pertinent lab results from the last 24 hours have been reviewed.

## 2024-02-19 NOTE — Clinical Note
"When patient had received  Protamine  30mg out of 40mg that were ordered, patient became unresponsive, diaphoretic , unable to obtain BP, no observable chest rise or signs of breathing, patient's eyes "rolled back". No response to sternal rub, called for help, using ambu bag, defib pads applied, Dr. Almeida paged to return to room and now at bedside, Benadryl 50mg iv given, Solumedrol 125mg iv given, Zofran 4mg iv given. Patient slowl y began to respond, breathing on his own, applied o2 3l per nc, sinus loan on monitor, bp 144/69. Will continue to monitor patient in cath lab holding."

## 2024-02-19 NOTE — ASSESSMENT & PLAN NOTE
Long-standing history of essential tremor with BL arm/hand shaking  On metoprolol for his AF but not on other meds for tremor  Essential tremor likely the cause for the hand shaking seen during his unresponsive episode as it was unchanged from his baseline

## 2024-02-19 NOTE — ASSESSMENT & PLAN NOTE
Recent TTE 11/2023:    Left Ventricle: The left ventricle is normal in size. Ventricular mass is normal. Normal wall thickness. There is concentric remodeling. Normal wall motion. There is normal systolic function with a visually estimated ejection fraction of 55 - 60%. Biplane (2D) method of discs ejection fraction is 55%. There is diastolic dysfunction. Elevated left ventricular filling pressure.    Right Ventricle: Normal right ventricular cavity size. Systolic function is normal.    Left Atrium: Left atrium is moderately dilated.    Aortic Valve: There is moderate aortic valve sclerosis. Moderately restricted motion. There is mild to moderate stenosis. Aortic valve area by VTI is 1.34 cm². Aortic valve peak velocity is 2.92 m/s. Mean gradient is 18 mmHg. The dimensionless index is 0.41. There is mild to moderate aortic regurgitation.    Mitral Valve: There is moderate posterior mitral annular calcification present. There is mild regurgitation.    Tricuspid Valve: There is mild regurgitation.    Pulmonic Valve: There is mild regurgitation.    Pulmonary Artery: The estimated pulmonary artery systolic pressure is 45 mmHg.    IVC/SVC: Intermediate venous pressure at 8 mmHg.    Appears euvolemic on exam  Continue home lasix to maintain euvolemia

## 2024-02-19 NOTE — CONSULTS
[facility] - Cardiology [facility] Medicine  Consult Note    Patient Name: Ibrahima Phelps  MRN: [id number]  Admission Date: 2/19/2024  Hospital Length of Stay: 1 days  Attending Physician: JUANI Melendez III, MD   Primary Care Provider: Anatoliy Colindres MD     Patient information was obtained from patient, spouse/SO, past medical records, and ER records.     Inpatient consult to Hospital Medicine-General  Consult performed by: Mary Zimmer MD  Consult ordered by: Han Acosta MD        Subjective:     Principal Problem: Syncope    Chief Complaint: No chief complaint on file.     HPI: 68 year old male with ESRD s/p kidney transplant x2 (1992, 2005) on chronic immunosuppressive medication, CAD s/p PCI (2006), HTN, HLD, Chronic diastolic CHF, tachycardia-bradycardia syndrome with periods of pAF admitted to vascular surgery. Presented for outpatient fistulogram (recently had the fistula created on 12/18/23) with plans to start HD however had a period of unresponsiveness after the procedure. Received protamine for heparin reversal and 30 mins after the protamine was given, he became unresponsive with unfocused eyes; also noted to have left arm shaking. HR was low in the 40s but O2 sats were normal; BP was not obtained during the episode but was obtained about 2 minutes later and it was norml. Pt was drowsy but arousable and able to answer questions after the episode. Pt seen at bedside on third floor; wife at bedside. Wife reports he's had episodes similar to this for about 1.5 years however he has never been evaluated for them. Wife denies associated urinary or fecal incontinence, tongue biting, or whole body shaking associated with these episodes. Pt has an essential tremor and always has BL arm/hand shaking which is at it's baseline per Pt and wife. No personal or FHx seizure. Does not measure his BP or HR at home or during these episodes of unresponsiveness. Follows with Dr. Coleman for his pAF; also has  ORTHOPEDICS DISCHARGE SUMMARY  Yovany Brown 47 y.o. male MRN: [id number]  Unit/Bed#:     Attending Physician: Dr. Linda Melendez    Admitting diagnosis: Infection of total right knee replacement, initial encounter [facility]) Deborrah Laser  Status post revision of total knee replacement, right [Z96.651]    Discharge diagnosis: Infection of total right knee replacement, initial encounter [facility]) Deborrah Laser  Status post revision of total knee replacement, right [Z96.651]    Date of admission: 10/18/2023    Date of discharge: 10/24/23         Procedure: s/p right total knee arthroplasty revision for second stage prosthesis joint infection    HPI:  This is a 47y.o. year old male that presented to the office with signs and symptoms of right knee prosthesis joint infection. He had already underwent explant with articulating antibiotic spacer on 7/28/23 and wished to proceed with surgical intervention. The risks, benefits, and complications of the procedure were discussed with the patient and informed consent was obtained. Hospital Course: The patient was admitted to the hospital on 10/18/2023 and underwent an uncomplicated right total knee arthroplasty revision for stage 2 PJI. They were transferred to the floor after a brief stay in the post-anesthesia care unit. Their pain was well managed with IV and oral pain medications. They began therapy on post operative day #1. ASA 81 mg BID was also started for DVT prophylaxis 12 hours post operatively. Intra-op cultures grew Staphylococcus aureus. Infectious disease was consulted who recommended IV ancef for 6 weeks and possible lifelong suppressive oral antibiotics. PICC was placed and home health was set up to give IV antibiotics. On discharge date pt was cleared by PT and the medicine team and determined to be safe for discharge. Daily discussion was had with the patient, nursing staff, orthopaedic team, and family members if present.   All questions were answered to the "tachy-loan syndrome but does not have a PPM; Pt and wife states PPM has been discussed with them previously but there were no plans to insert one as he was previously asymptomatic. Reports feeling well prior to his fistulogram; denies fevers, chills, cough, nausea, vomiting, diarrhea, or dysuria.     Medicine consulted for evaluation of transfer to hospital medicine.     Past Medical History:   Diagnosis Date    Atrial fibrillation     CAD (coronary artery disease) 2006    MI in 2006    CHF (congestive heart failure) 2017    CKD (chronic kidney disease) stage 3, GFR 30-59 ml/min     Dr. Latif.  in transplanted kidney    COVID-19 2/7/2023    Diverticulosis     Hyperlipidemia     Hypertension     Keloid cicatrix     Metabolic bone disease     Other emphysema 10/1/2019    Pericarditis     S/P kidney transplant 1992    x2 (1992 & 2005),    Thrombocytopenia     Thyroid disease     Tobacco use 09/05/2014     Past Surgical History:   Procedure Laterality Date    AV FISTULA PLACEMENT Left 12/18/2023    Procedure: CREATION, AV FISTULA;  Surgeon: JUANI Melendez III, MD;  [facility];  Service: Vascular;  Laterality: Left;  LUE AVF creation vs AVG insertion    CARDIOVERSION  04/30/15    CHOLECYSTECTOMY      COLONOSCOPY  April 20, 2011    Diverticulosis, repeat recommended in 3 yrs., repeat colonoscopy 2014 revealed 2 polyps.  He should return in 5 years.    COLONOSCOPY N/A 3/13/2020    Procedure: COLONOSCOPY;  Surgeon: Chon Casper MD;  Location: [facility] (4TH FLR);  Service: Endoscopy;  Laterality: N/A;  ok to hold coumadin x5days-see telephone encounter 2/4/20-tb    COLONOSCOPY N/A 2/4/2021    Procedure: COLONOSCOPY;  Surgeon: Chon Casper MD;  Location: [facility] (4TH FLR);  Service: Endoscopy;  Laterality: N/A;  Eliquis - per Dr. GAVIN Blunt ok to hold x 2 days and "restarted asap"- ERW  last prep "poor", [id number] ordered/ Pt requests Dr. Casper  prep ins. mailed - COVID screening on 2/1/21 " patients satisifaction. 0   Lab Value Date/Time    HGB 9.4 (L) 10/23/2023 0135    HGB 9.6 (L) 10/22/2023 0334    HGB 9.2 (L) 10/21/2023 0538    HGB 10.0 (L) 10/20/2023 0529    HGB 10.5 (L) 10/19/2023 0205    HGB 12.1 09/25/2023 1225    HGB 8.7 (L) 08/02/2023 0612    HGB 8.8 (L) 07/31/2023 0500    HGB 8.8 (L) 07/30/2023 0438    HGB 8.9 (L) 07/29/2023 0506    HGB 10.1 (L) 07/21/2023 0846    HGB 13.1 06/18/2023 1108       Patient had greater than 2 gram drop which qualifies for diagnosis of acute blood loss anemia. Vital signs remained stable and pt was resuscitated with IVF as needed       Discharge Instructions: The patient was discharged weight bearing as tolerated to the right lower extremity. ASA 81 mg BID will be continued for 28 days. Continue PT/OT. Take pain medications as instructed. Continue IV antibiotics as prescribed. Discharge Medications: For the complete list of discharge medications, please refer to the patient's medication reconciliation. PCW- ERW    COLONOSCOPY N/A 3/3/2021    Procedure: COLONOSCOPY;  Surgeon: Chon Casper MD;  Location: [facility] ENDO (4TH FLR);  Service: Endoscopy;  Laterality: N/A;  Patient with his second poor bowel pre and has poor prep today.  If patient not intersted or can't get colonoscopy tomorrow will need constipation bowel prep and will need to restart his Eliquis today.Thanks,Chon     per Dr Lopez to hold Eliquis 2 days prior      COVID     CORONARY ANGIOPLASTY WITH STENT PLACEMENT  9/13/2006    IRRIGATION AND DEBRIDEMENT Right 8/30/2023    Procedure: IRRIGATION AND DEBRIDEMENT, RIGHT MIDDLE FINGER;  Surgeon: Martin Larsen MD;  Location: [facility] OR 2ND FLR;  Service: Orthopedics;  Laterality: Right;    KIDNEY TRANSPLANT  2005    PARATHYROIDECTOMY      TREATMENT OF CARDIAC ARRHYTHMIA N/A 3/28/2022    Procedure: CARDIOVERSION;  Surgeon: Migue Blunt MD;  Location: [facility] EP LAB;  Service: Cardiology;  Laterality: N/A;  AF, DCC, MAC, GP, 3 PREP*RODRIGO deferred pt 100% compliant*       Review of patient's allergies indicates:   Allergen Reactions    Ace inhibitors Swelling    Verapamil Other (See Comments)     Other reaction(s): Unknown    Povidone-iodine Itching       No current facility-administered medications on file prior to encounter.     Current Outpatient Medications on File Prior to Encounter   Medication Sig    acetaminophen (TYLENOL) 500 MG tablet Take 1 tablet (500 mg total) by mouth every 6 (six) hours as needed for Pain.    amiodarone (PACERONE) 200 MG Tab Take 1 tablet (200 mg total) by mouth once daily.    atorvastatin (LIPITOR) 40 MG tablet Take 2 tablets (80 mg total) by mouth once daily.    b complex vitamins (B COMPLEX-VITAMIN B12) tablet Take 1 tablet by mouth once daily.    calcium acetate,phosphat bind, (PHOSLO) 667 mg capsule TAKE 1 CAPSULE BY MOUTH THREE TIMES A DAY WITH MEALS AND SNACKS    calcium carbonate (CALCIUM 600 ORAL) Take 1 tablet by mouth 2 (two) times a day.     CHOLECALCIFEROL, VITAMIN D3, ORAL Take 2 tablets by mouth 2 (two) times daily.    clopidogreL (PLAVIX) 75 mg tablet Take 1 tablet (75 mg total) by mouth once daily.    doxazosin (CARDURA) 4 MG tablet Take 1 tablet (4 mg total) by mouth every 12 (twelve) hours.    famotidine (PEPCID) 20 MG tablet Take 1 tablet (20 mg total) by mouth 2 (two) times daily.    furosemide (LASIX) 80 MG tablet Take 1 tablet (80 mg total) by mouth every morning.    hydrALAZINE (APRESOLINE) 50 MG tablet TAKE 1 TABLET (50 MG TOTAL) BY MOUTH 3 (THREE) TIMES DAILY.    isosorbide mononitrate (IMDUR) 30 MG 24 hr tablet Take 4 tablets (120 mg total) by mouth once daily.    metoprolol succinate (TOPROL-XL) 25 MG 24 hr tablet Take 0.5 tablets (12.5 mg total) by mouth 2 (two) times daily.    multivitamin (THERAGRAN) per tablet Take 1 tablet by mouth Daily.    NIFEdipine (PROCARDIA-XL) 60 MG (OSM) 24 hr tablet Take 1 tablet (60 mg total) by mouth 2 (two) times a day.    potassium chloride (KLOR-CON) 10 MEQ TbSR Take 1 tablet (10 mEq total) by mouth daily as needed (to be taken with lasix).,    tacrolimus (PROGRAF) 1 MG Cap TAKE 3 CAPSULES (3 MG TOTAL) BY MOUTH EVERY 12 (TWELVE) HOURS.    tiotropium bromide (SPIRIVA RESPIMAT) 1.25 mcg/actuation inhaler Inhale 2 puffs into the lungs once daily. Controller. Use this inhaler every day.    traZODone (DESYREL) 50 MG tablet Take 1 tablet (50 mg total) by mouth nightly as needed for Insomnia.    albuterol (VENTOLIN HFA) 90 mcg/actuation inhaler Inhale 2 puffs into the lungs every 6 (six) hours as needed for Wheezing or Shortness of Breath. Rescue    albuterol-ipratropium (DUO-NEB) 2.5 mg-0.5 mg/3 mL nebulizer solution Take 3 mLs by nebulization every 6 (six) hours as needed for Wheezing. Rescue. Can be used in place of albuterol inhaler.    apixaban (ELIQUIS) 5 mg Tab Take 1 tablet (5 mg total) by mouth 2 (two) times daily.    fexofenadine HCl (ALLEGRA ORAL) Take 1 tablet by mouth daily as needed (allergies).     fluticasone furoate-vilanteroL (BREO) 100-25 mcg/dose diskus inhaler Inhale 1 puff into the lungs once daily. Controller. Use this inhaler every day. (Patient not taking: Reported on 2/6/2024)    fluticasone propionate (FLONASE) 50 mcg/actuation nasal spray 1 spray (50 mcg total) by Each Nostril route daily as needed for Allergies.    FLUZONE HIGHDOSE QUAD 23-24  mcg/0.7 mL Syrg     furosemide (LASIX) 20 MG tablet One po QAM, and one po at lunchtime if swelling prn    gabapentin (NEURONTIN) 100 MG capsule Take 1 capsule (100 mg total) by mouth as needed (pain).    HYDROcodone-acetaminophen (NORCO) 5-325 mg per tablet Take 1 tablet by mouth every 6 (six) hours as needed for Pain.    MITIGARE 0.6 mg Cap TAKE 1 CAPSULE BY MOUTH TWICE A DAY AS NEEDED GOUT FLARE UP TO 3 DAYS    nitroGLYCERIN (NITROSTAT) 0.4 MG SL tablet Place 1 tablet (0.4 mg total) under the tongue every 5 (five) minutes as needed for Chest pain. (Patient not taking: Reported on 2/6/2024)    omeprazole (PRILOSEC) 20 MG capsule Take 1 capsule (20 mg total) by mouth daily as needed (heartburn).    paricalcitoL (ZEMPLAR) 1 MCG capsule TAKE 1 CAPSULE ON MONDAY, WEDNESDAY AND FRIDAY    PFIZER COVID BIVAL,12Y UP,,PF, 30 mcg/0.3 mL injection Inject into the muscle.    pulse oximeter (PULSE OXIMETER) device by Apply Externally route 2 (two) times a day. Use twice daily at 8 AM and 3 PM and record the value in [employer] as directed.    SPIKEVAX [id number],12Y UP,,PF, 50 mcg/0.5 mL injection      Family History       Problem Relation (Age of Onset)    Heart disease Father    Kidney disease Sister, Brother    Kidney failure Sister, Brother    No Known Problems Sister, Brother, Sister, Sister    Thyroid disease Mother          Tobacco Use    Smoking status: Every Day     Current packs/day: 0.00     Average packs/day: 0.5 packs/day for 40.0 years (20.0 ttl pk-yrs)     Types: Cigarettes     Start date: 2/28/1982     Last attempt to quit: 2/28/2022     Years  since quittin.9    Smokeless tobacco: Never    Tobacco comments:     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     Patient is currently retired   Substance and Sexual Activity    Alcohol use: No    Drug use: No    Sexual activity: Yes     Partners: Female     Review of Systems   Constitutional:  Positive for fatigue (generalized). Negative for chills and fever.   HENT: Negative.     Eyes: Negative.  Negative for discharge.   Respiratory:  Negative for cough, shortness of breath and wheezing.    Cardiovascular:  Negative for chest pain, palpitations and leg swelling.   Gastrointestinal:  Negative for abdominal pain, diarrhea, nausea and vomiting.   Genitourinary:  Negative for dysuria, frequency and hematuria.   Musculoskeletal: Negative.    Skin:  Negative for rash and wound.   Neurological:  Positive for tremors (chronic, essential). Negative for dizziness and headaches.   Psychiatric/Behavioral:  Negative for agitation and confusion.      Objective:     Vital Signs (Most Recent):  Temp: 97.8 °F (36.6 °C) (24 1310)  Pulse: (!) 59 (24 1311)  Resp: 18 (24 1310)  BP: 127/89 (24 1310)  SpO2: 96 % (24 1310) Vital Signs (24h Range):  Temp:  [97.7 °F (36.5 °C)-97.8 °F (36.6 °C)] 97.8 °F (36.6 °C)  Pulse:  [52-80] 59  Resp:  [15-18] 18  SpO2:  [96 %-98 %] 96 %  BP: (127-143)/(63-89) 127/89     Weight: 86.2 kg (190 lb)  Body mass index is 25.07 kg/m².     Physical Exam  Vitals and nursing note reviewed.   Constitutional:       General: He is not in acute distress.     Appearance: Normal appearance. He is not ill-appearing.   HENT:      Head: Normocephalic and atraumatic.      Mouth/Throat:      Mouth: Mucous membranes are moist.      Comments: Poor dentition  Cardiovascular:      Rate and Rhythm: Regular rhythm. Bradycardia present.      Pulses: Normal pulses.      Heart sounds: Normal heart sounds. No murmur heard.  Pulmonary:      Effort: Pulmonary  "effort is normal.      Breath sounds: Normal breath sounds. No wheezing, rhonchi or rales.   Abdominal:      General: Bowel sounds are normal. There is no distension.      Palpations: Abdomen is soft.      Tenderness: There is abdominal tenderness (mild, generalized). There is no guarding or rebound.   Musculoskeletal:         General: No tenderness.      Right lower leg: No edema.      Left lower leg: No edema.      Comments: Tremor note BL arms which is at baseline per Pt and wife   Skin:     General: Skin is warm.      Capillary Refill: Capillary refill takes less than 2 seconds.      Findings: No erythema or rash.   Neurological:      General: No focal deficit present.      Mental Status: He is alert.      Comments: Oriented to person, place, and mostly time (states the wrong year)          Significant Labs: All pertinent labs within the past 24 hours have been reviewed.  CBC:   Recent Labs   Lab 02/19/24  1125   WBC 6.67   HGB 7.7*   HCT 23.9*   *     Lactic Acid: No results for input(s): "LACTATE" in the last 48 hours.  POCT Glucose: No results for input(s): "POCTGLUCOSE" in the last 48 hours.  Recent Lab Results         02/19/24  1231   02/19/24  1125   02/19/24  1119   02/19/24  1117        Anion Gap   13           Baso #   0.01           Basophil %   0.1           BUN   52           Calcium   9.3           Chloride   112           CO2   18           Creatinine   7.4           Differential Method   Automated           eGFR   7.4           Eos #   0.2           Eos %   2.8           Glucose   103           Gran # (ANC)   4.7           Gran %   71.3           Hematocrit   23.9           Hemoglobin   7.7           Immature Grans (Abs)   0.11  Comment: Mild elevation in immature granulocytes is non specific and   can be seen in a variety of conditions including stress response,   acute inflammation, trauma and pregnancy. Correlation with other   laboratory and clinical findings is essential.             " Immature Granulocytes   1.6           Lymph #   1.4           Lymph %   20.8           MCH   25.9           MCHC   32.2           MCV   81           Mono #   0.2           Mono %   3.4           MPV   SEE COMMENT  Comment: Result not available.           nRBC   0           QRS Duration 106     108         OHS QTC Calculation 517     453         Platelet Count   118           Potassium   4.0           RBC   2.97           RDW   17.8           Sodium   143           Troponin I       0.055  Comment: The reference interval for Troponin I represents the 99th percentile   cutoff   for our facility and is consistent with 3rd generation assay   performance.         WBC   6.67                 Significant Imaging: I have reviewed all pertinent imaging results/findings within the past 24 hours.  Assessment/Plan:     * Syncope  Admitted to vascular surgery following a period of unresponsiveness after an outpatient fistulogram was performed. About 30 mins after protamine was given to reverse the heparin, Pt became unresponsive; HR noted to be in the 40s  BP not obtained during the episode but was obtained about 2 mins later and noted to be 120s/60s. Maintained own airway and O2 sats. Pt was answering questions appropriately after the event  Has a h/o tachy-loan syndrome and pAF; known to Dr. Coleman; last seen in clinic October 2023; PPM was discussed however because Pt was having asymptomatic bradycardia at the time, no plans for PPM insertion were made  Wife notes that Pt has been having episodes similar to today's at home for about 1.5 years; denies urinary or fecal incontinence or tongue biting with these episodes; Pt has an essential tremor and always has BL arm shaking which is unchanged during these episodes     Cause of syncope likely symptomatic bradycardia  Remains in sinus bradycardia with HR 45-53 on telemetry; EKG with sinus bradycardia    - hold metoprolol and amiodarone given bradycardia  - remain on  telemetry  - EKG prn  - spoke with cardiology, consulted EP for evaluation of possible PPM given now symptomatic bradycardia  - recent TTE in November 2023 showed preserved EF, some diastolic dysfunction; does not appear in CHF exacerbation exam; will hold on repeating for now  - episodes seem more in keeping with syncope from symptomatic bradycardia than seizure episode; no personal or FHx of the same; will hold on EEG for now     GERD (gastroesophageal reflux disease)  Takes omeprazole at home; not on formulary, continue pantoprazole while admitted    Symptomatic bradycardia  See syncope    ESRD (end stage renal disease)  Creatine stable for now. BMP reviewed- noted Estimated Creatinine Clearance: 10.6 mL/min (A) (based on SCr of 7.4 mg/dL (H)). according to latest data. Based on current GFR, CKD stage is stage 5 - GFR < 15.  Monitor UOP and serial BMP and adjust therapy as needed. Renally dose meds. Avoid nephrotoxic medications and procedures.  Recently had a fistula created on 12/18/23  Still makes urine  Takes lasix 80mg daily at home; continue on admission  Continue paricalcitriol and phosphate binder   Renal diet     Benign essential tremor  Long-standing history of essential tremor with BL arm/hand shaking  On metoprolol for his AF but not on other meds for tremor  Essential tremor likely the cause for the hand shaking seen during his unresponsive episode as it was unchanged from his baseline    Paroxysmal atrial fibrillation  Has previously undergone cardioversion  Takes amiodarone 200mg daily, eliquis, metoprolol 12.5mg BID at home  Hold metoprolol and amiodarone in setting of bradycardia  Continue eliquis  Remain on telemetry    (HFpEF) heart failure with preserved ejection fraction  Recent TTE 11/2023:    Left Ventricle: The left ventricle is normal in size. Ventricular mass is normal. Normal wall thickness. There is concentric remodeling. Normal wall motion. There is normal systolic function with a  visually estimated ejection fraction of 55 - 60%. Biplane (2D) method of discs ejection fraction is 55%. There is diastolic dysfunction. Elevated left ventricular filling pressure.    Right Ventricle: Normal right ventricular cavity size. Systolic function is normal.    Left Atrium: Left atrium is moderately dilated.    Aortic Valve: There is moderate aortic valve sclerosis. Moderately restricted motion. There is mild to moderate stenosis. Aortic valve area by VTI is 1.34 cm². Aortic valve peak velocity is 2.92 m/s. Mean gradient is 18 mmHg. The dimensionless index is 0.41. There is mild to moderate aortic regurgitation.    Mitral Valve: There is moderate posterior mitral annular calcification present. There is mild regurgitation.    Tricuspid Valve: There is mild regurgitation.    Pulmonic Valve: There is mild regurgitation.    Pulmonary Artery: The estimated pulmonary artery systolic pressure is 45 mmHg.    IVC/SVC: Intermediate venous pressure at 8 mmHg.    Appears euvolemic on exam  Continue home lasix to maintain euvolemia    H/O kidney transplant  Takes tacrolimus 3mg BID, prednisone 5mg daily at home  Continue on admission  Daily tacrolimus level  KTM consult, appreciate recs    Mixed hyperlipidemia  Continue home statin    CAD (coronary artery disease)  S/p PCI in 2006  Continue home clopidogrel    Primary hypertension  Temp:  [97.7 °F (36.5 °C)-97.8 °F (36.6 °C)]   Pulse:  [52-80]   Resp:  [15-18]   BP: (127-143)/(63-89)   SpO2:  [96 %-98 %] .     Takes nifedipine 60mg BID, hydralazine 50mg TID, ISMN 120mg daily, doxazosin 4mg BID at home  Normotensive currently  Will hold BP meds in the interim given unresponsive episode and low/normal BP immediately following it  Monitor BP and restart home medications as indicated      VTE Risk Mitigation (From admission, onward)           Ordered     apixaban tablet 5 mg  2 times daily         02/19/24 1344     IP VTE HIGH RISK PATIENT  Once         02/19/24 1100      Place sequential compression device  Until discontinued         02/19/24 1100                  Pt will be transferred to hospital medicine at 7am on 2/20. Appreciate vascular surgery cross-cover overnight 2/19    Thank you for your consult. I will follow-up with patient. Please contact us if you have any additional questions.    Mary Zimmer MD  Department of Hospital Medicine   [facility] - Cardiology Stepdown

## 2024-02-19 NOTE — ASSESSMENT & PLAN NOTE
Takes tacrolimus 3mg BID, prednisone 5mg daily at home  Continue on admission  Daily tacrolimus level  KTM consult, appreciate recs

## 2024-02-19 NOTE — BRIEF OP NOTE
"[facility] - Cath Lab  Brief Operative Note    Surgery Date: 2/19/2024     Surgeon(s) and Role:     * JUANI Melendez III, MD - Primary     * Han Acosta MD - Fellow    Assisting Surgeon: None    Pre-op Diagnosis:  Arteriovenous fistula stenosis, subsequent encounter [T82.858D]    Post-op Diagnosis:  Post-Op Diagnosis Codes:     * Arteriovenous fistula stenosis, subsequent encounter [T82.858D]    PROCEDURES:    PTA, L AVF (7x60 cephalic arch, 8 x 80 proximally)  Conscious Sedation    Anesthesia: RN IV Sedation    Operative Findings: >90% stenosis in both locations, < 20% after with much improved thrill    Estimated Blood Loss: 5cc         After the procedure was complete, still in the cath lab,   the pt became unresponsive, BP never low, transiently bagged, began to respond appropriately.   DDx included protamine reaction, sedation deeper than anticipated, or cardiac event.    Will admit for observation.    Discussed with the wife who states he has had "spells" in the past with a negative w/u    W. Ha Melendez III, MD, FACS  Professor and Chief, Vascular and Endovascular Surgery    "

## 2024-02-19 NOTE — HPI
68 year old male with ESRD s/p kidney transplant x2 (1992, 2005) on chronic immunosuppressive medication, CAD s/p PCI (2006), HTN, HLD, Chronic diastolic CHF, tachycardia-bradycardia syndrome with periods of pAF admitted to vascular surgery. Presented for outpatient fistulogram (recently had the fistula created on 12/18/23) with plans to start HD however had a period of unresponsiveness after the procedure. Received protamine for heparin reversal and 30 mins after the protamine was given, he became unresponsive with unfocused eyes; also noted to have left arm shaking. HR was low in the 40s but O2 sats were normal; BP was not obtained during the episode but was obtained about 2 minutes later and it was norml. Pt was drowsy but arousable and able to answer questions after the episode. Pt seen at bedside on third floor; wife at bedside. Wife reports he's had episodes similar to this for about 1.5 years however he has never been evaluated for them. Wife denies associated urinary or fecal incontinence, tongue biting, or whole body shaking associated with these episodes. Pt has an essential tremor and always has BL arm/hand shaking which is at it's baseline per Pt and wife. No personal or FHx seizure. Does not measure his BP or HR at home or during these episodes of unresponsiveness. Follows with Dr. Coleman for his pAF; also has tachy-loan syndrome but does not have a PPM; Pt and wife states PPM has been discussed with them previously but there were no plans to insert one as he was previously asymptomatic. Reports feeling well prior to his fistulogram; denies fevers, chills, cough, nausea, vomiting, diarrhea, or dysuria.     Medicine consulted for evaluation of transfer to hospital medicine.

## 2024-02-19 NOTE — ASSESSMENT & PLAN NOTE
Has previously undergone cardioversion  Takes amiodarone 200mg daily, eliquis, metoprolol 12.5mg BID at home  Hold metoprolol and amiodarone in setting of bradycardia  Continue eliquis  Remain on telemetry

## 2024-02-19 NOTE — ASSESSMENT & PLAN NOTE
Mr. Ibrahima Phelps is a 68-year-old gentleman with a PMHx of paroxysmal atrial fibrillation, tachy-loan syndrome, HFpEF with most recent LVEF 65%, CAD s/p PCI 2006, HTN, HLD, aortic atherosclerosis, glucose intolerance, CKD stage IV with a history of prior renal transplant x2 in 2002 and 2005 with chronic immunosuppressive medication, secondary hyperparathyroidism, diverticulosis, COPD, GERD, hypothyroidism, gout, a history of tobacco use, and obesity admitted for stent placement after recent left AV fistula placement in Dec 2023 complicated by stenosis. He presented today for stent placement that was successfully placed however after procedure he had a reported episode of brief loss of consciousness where HR dropped into the 40s. Patient normotensive. Lethargic after with normal vitals. No cardiac strips of evidence provided therefore unclear exact rhythm however suspect several confounding factors that may have contributed this which include pain causing vagal episode or post anaesthesia bradycardia. Telemetry reviewed and he remains in sinus bradycardia (50s) with no evidence of high AV block. Currently there is not indication of pacemaker placement.     He has been doing well and maintaining sinus rhythm with home regimen therefore would resume home medications including Amiodarone 200mg daily and Toprol 12.5mg daily.     Recommend monitoring overnight on telemetry with reintroduction of home medications and can follow up outpatient with Dr. Parmar.

## 2024-02-19 NOTE — HPI
Mr. Ibrahima Phelps is a 68-year-old gentleman with a PMHx of paroxysmal atrial fibrillation, HFpEF with most recent LVEF 65%, coronary artery disease with PCI in 2006, hypertension, hyperlipidemia, aortic atherosclerosis, glucose intolerance, CKD stage IV with a history of prior renal transplant x2 in 2002 and 2005 with chronic immunosuppressive medication, secondary hyperparathyroidism, diverticulosis, COPD, GERD, hypothyroidism, gout, a history of tobacco use, and obesity. In clinic Dr. Parmar discussed a pacemaker may be required in order to increase antiarrhythmic therapy without causing symptomatic bradycardia if needed. Also discussed option of RFA PVI. He has been doing well maintaining sinus ryhthm on Amio 200 daily and Toprol 12.5 daily. A left sided AV fistula was created in December however there was evidence of stenosis and therefore he presented today for stent placement. At the end of the procedure while patient was still in the lab,  pt became unresponsive, normotensive, transiently bagged and began to respond appropriately. Reportedly HR dropped to 40s but no strips in chart and nothing on telemetry. After episode, patient was lethargic, however arousable, answering questions appropriately based on report.  All vitals within normal limits. When discussed events with patient, he does not recall any of this. Currently remains in sinus bradycardia (50s). While in CSU no events on telemetry and no evidence of high grade AV block.   EP consulted for evaluation of pacemaker.

## 2024-02-19 NOTE — ASSESSMENT & PLAN NOTE
Creatine stable for now. BMP reviewed- noted Estimated Creatinine Clearance: 10.6 mL/min (A) (based on SCr of 7.4 mg/dL (H)). according to latest data. Based on current GFR, CKD stage is stage 5 - GFR < 15.  Monitor UOP and serial BMP and adjust therapy as needed. Renally dose meds. Avoid nephrotoxic medications and procedures.  Recently had a fistula created on 12/18/23  Still makes urine  Takes lasix 80mg daily at home; continue on admission  Continue paricalcitriol and phosphate binder   Renal diet

## 2024-02-19 NOTE — ASSESSMENT & PLAN NOTE
Temp:  [97.7 °F (36.5 °C)-97.8 °F (36.6 °C)]   Pulse:  [52-80]   Resp:  [15-18]   BP: (127-143)/(63-89)   SpO2:  [96 %-98 %] .     Takes nifedipine 60mg BID, hydralazine 50mg TID, ISMN 120mg daily, doxazosin 4mg BID at home  Normotensive currently  Will hold BP meds in the interim given unresponsive episode and low/normal BP immediately following it  Monitor BP and restart home medications as indicated

## 2024-02-19 NOTE — SIGNIFICANT EVENT
After procedure completed, patient receiving protamine for reversal of heparin.  After approximately 30 of protamine was in, nurse at bedside noted that he became all of a sudden unresponsive, eyes unfocused, noticed some left arm shaking.  Total episode less than a minute.  O2 sats were normal during this time frame,  Heart rate was noted to be in the 40s, BP was not obtained, however follow-up BP approximately 2 minutes after episode was within normal limits, 120s over 60s.     After episode, patient was sleepy, however arousable, answering questions appropriately.  All vitals within normal limits.    EKG obtained, normal sinus rhythm.  Lab work obtained including troponins which were otherwise unimpressive.    - Originally planned for discharge home, however given event with syncopal versus seizure episode, we will plan to keep in hospital/transfer to ED for evaluation.

## 2024-02-19 NOTE — SUBJECTIVE & OBJECTIVE
"Past Medical History:   Diagnosis Date    Atrial fibrillation     CAD (coronary artery disease) 2006    MI in 2006    CHF (congestive heart failure) 2017    CKD (chronic kidney disease) stage 3, GFR 30-59 ml/min     Dr. Latif.  in transplanted kidney    COVID-19 2/7/2023    Diverticulosis     Hyperlipidemia     Hypertension     Keloid cicatrix     Metabolic bone disease     Other emphysema 10/1/2019    Pericarditis     S/P kidney transplant 1992    x2 (1992 & 2005),    Thrombocytopenia     Thyroid disease     Tobacco use 09/05/2014     Past Surgical History:   Procedure Laterality Date    AV FISTULA PLACEMENT Left 12/18/2023    Procedure: CREATION, AV FISTULA;  Surgeon: JUANI Melendez III, MD;  Location: [facility] OR 2ND FLR;  Service: Vascular;  Laterality: Left;  LUE AVF creation vs AVG insertion    CARDIOVERSION  04/30/15    CHOLECYSTECTOMY      COLONOSCOPY  April 20, 2011    Diverticulosis, repeat recommended in 3 yrs., repeat colonoscopy 2014 revealed 2 polyps.  He should return in 5 years.    COLONOSCOPY N/A 3/13/2020    Procedure: COLONOSCOPY;  Surgeon: Chon Casper MD;  Location: [facility] (4TH FLR);  Service: Endoscopy;  Laterality: N/A;  ok to hold coumadin x5days-see telephone encounter 2/4/20-tb    COLONOSCOPY N/A 2/4/2021    Procedure: COLONOSCOPY;  Surgeon: Chon Casper MD;  Location: [facility] (4TH FLR);  Service: Endoscopy;  Laterality: N/A;  Eliquis - per Dr. GAVIN Blnut ok to hold x 2 days and "restarted asap"- ERW  last prep "poor", [id number] ordered/ Pt requests Dr. Casper  prep ins. mailed - COVID screening on 2/1/21 [facility]- ERW    COLONOSCOPY N/A 3/3/2021    Procedure: COLONOSCOPY;  Surgeon: Chon Casper MD;  Location: [facility] (4TH FLR);  Service: Endoscopy;  Laterality: N/A;  Patient with his second poor bowel pre and has poor prep today.  If patient not intersted or can't get colonoscopy tomorrow will need constipation bowel prep and will need to restart his Eliquis " today.Thanks,Chon     per Dr Lopez to hold Eliquis 2 days prior      COVID     CORONARY ANGIOPLASTY WITH STENT PLACEMENT  9/13/2006    IRRIGATION AND DEBRIDEMENT Right 8/30/2023    Procedure: IRRIGATION AND DEBRIDEMENT, RIGHT MIDDLE FINGER;  Surgeon: Martin Larsen MD;  Location: [facility] OR [facility] FLR;  Service: Orthopedics;  Laterality: Right;    KIDNEY TRANSPLANT  2005    PARATHYROIDECTOMY      TREATMENT OF CARDIAC ARRHYTHMIA N/A 3/28/2022    Procedure: CARDIOVERSION;  Surgeon: Migue Blunt MD;  Location: [facility] EP LAB;  Service: Cardiology;  Laterality: N/A;  AF, DCC, MAC, GP, 3 PREP*RODRIGO deferred pt 100% compliant*       Review of patient's allergies indicates:   Allergen Reactions    Ace inhibitors Swelling    Verapamil Other (See Comments)     Other reaction(s): Unknown    Povidone-iodine Itching       No current facility-administered medications on file prior to encounter.     Current Outpatient Medications on File Prior to Encounter   Medication Sig    acetaminophen (TYLENOL) 500 MG tablet Take 1 tablet (500 mg total) by mouth every 6 (six) hours as needed for Pain.    amiodarone (PACERONE) 200 MG Tab Take 1 tablet (200 mg total) by mouth once daily.    atorvastatin (LIPITOR) 40 MG tablet Take 2 tablets (80 mg total) by mouth once daily.    b complex vitamins (B COMPLEX-VITAMIN B12) tablet Take 1 tablet by mouth once daily.    calcium acetate,phosphat bind, (PHOSLO) 667 mg capsule TAKE 1 CAPSULE BY MOUTH THREE TIMES A DAY WITH MEALS AND SNACKS    calcium carbonate (CALCIUM 600 ORAL) Take 1 tablet by mouth 2 (two) times a day.    CHOLECALCIFEROL, VITAMIN D3, ORAL Take 2 tablets by mouth 2 (two) times daily.    clopidogreL (PLAVIX) 75 mg tablet Take 1 tablet (75 mg total) by mouth once daily.    doxazosin (CARDURA) 4 MG tablet Take 1 tablet (4 mg total) by mouth every 12 (twelve) hours.    famotidine (PEPCID) 20 MG tablet Take 1 tablet (20 mg total) by mouth 2 (two) times daily.    furosemide (LASIX)  80 MG tablet Take 1 tablet (80 mg total) by mouth every morning.    hydrALAZINE (APRESOLINE) 50 MG tablet TAKE 1 TABLET (50 MG TOTAL) BY MOUTH 3 (THREE) TIMES DAILY.    isosorbide mononitrate (IMDUR) 30 MG 24 hr tablet Take 4 tablets (120 mg total) by mouth once daily.    metoprolol succinate (TOPROL-XL) 25 MG 24 hr tablet Take 0.5 tablets (12.5 mg total) by mouth 2 (two) times daily.    multivitamin (THERAGRAN) per tablet Take 1 tablet by mouth Daily.    NIFEdipine (PROCARDIA-XL) 60 MG (OSM) 24 hr tablet Take 1 tablet (60 mg total) by mouth 2 (two) times a day.    potassium chloride (KLOR-CON) 10 MEQ TbSR Take 1 tablet (10 mEq total) by mouth daily as needed (to be taken with lasix).,    tacrolimus (PROGRAF) 1 MG Cap TAKE 3 CAPSULES (3 MG TOTAL) BY MOUTH EVERY 12 (TWELVE) HOURS.    tiotropium bromide (SPIRIVA RESPIMAT) 1.25 mcg/actuation inhaler Inhale 2 puffs into the lungs once daily. Controller. Use this inhaler every day.    traZODone (DESYREL) 50 MG tablet Take 1 tablet (50 mg total) by mouth nightly as needed for Insomnia.    albuterol (VENTOLIN HFA) 90 mcg/actuation inhaler Inhale 2 puffs into the lungs every 6 (six) hours as needed for Wheezing or Shortness of Breath. Rescue    albuterol-ipratropium (DUO-NEB) 2.5 mg-0.5 mg/3 mL nebulizer solution Take 3 mLs by nebulization every 6 (six) hours as needed for Wheezing. Rescue. Can be used in place of albuterol inhaler.    apixaban (ELIQUIS) 5 mg Tab Take 1 tablet (5 mg total) by mouth 2 (two) times daily.    fexofenadine HCl (ALLEGRA ORAL) Take 1 tablet by mouth daily as needed (allergies).    fluticasone furoate-vilanteroL (BREO) 100-25 mcg/dose diskus inhaler Inhale 1 puff into the lungs once daily. Controller. Use this inhaler every day. (Patient not taking: Reported on 2/6/2024)    fluticasone propionate (FLONASE) 50 mcg/actuation nasal spray 1 spray (50 mcg total) by Each Nostril route daily as needed for Allergies.    FLUZONE HIGHDOSE QUAD 23-24   mcg/0.7 mL Syrg     furosemide (LASIX) 20 MG tablet One po QAM, and one po at lunchtime if swelling prn    gabapentin (NEURONTIN) 100 MG capsule Take 1 capsule (100 mg total) by mouth as needed (pain).    HYDROcodone-acetaminophen (NORCO) 5-325 mg per tablet Take 1 tablet by mouth every 6 (six) hours as needed for Pain.    MITIGARE 0.6 mg Cap TAKE 1 CAPSULE BY MOUTH TWICE A DAY AS NEEDED GOUT FLARE UP TO 3 DAYS    nitroGLYCERIN (NITROSTAT) 0.4 MG SL tablet Place 1 tablet (0.4 mg total) under the tongue every 5 (five) minutes as needed for Chest pain. (Patient not taking: Reported on 2024)    omeprazole (PRILOSEC) 20 MG capsule Take 1 capsule (20 mg total) by mouth daily as needed (heartburn).    paricalcitoL (ZEMPLAR) 1 MCG capsule TAKE 1 CAPSULE ON MONDAY, WEDNESDAY AND FRIDAY    PFIZER COVID BIVAL,12Y UP,,PF, 30 mcg/0.3 mL injection Inject into the muscle.    pulse oximeter (PULSE OXIMETER) device by Apply Externally route 2 (two) times a day. Use twice daily at 8 AM and 3 PM and record the value in Figure 8 [employer] as directed.    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     Patient is currently retired   Substance and Sexual Activity    Alcohol use: No    Drug use: No    Sexual activity: Yes     Partners: Female     Review of Systems   Constitutional:  Positive for fatigue (generalized).  Negative for chills and fever.   HENT: Negative.     Eyes: Negative.  Negative for discharge.   Respiratory:  Negative for cough, shortness of breath and wheezing.    Cardiovascular:  Negative for chest pain, palpitations and leg swelling.   Gastrointestinal:  Negative for abdominal pain, diarrhea, nausea and vomiting.   Genitourinary:  Negative for dysuria, frequency and hematuria.   Musculoskeletal: Negative.    Skin:  Negative for rash and wound.   Neurological:  Positive for tremors (chronic, essential). Negative for dizziness and headaches.   Psychiatric/Behavioral:  Negative for agitation and confusion.      Objective:     Vital Signs (Most Recent):  Temp: 97.8 °F (36.6 °C) (02/19/24 1310)  Pulse: (!) 59 (02/19/24 1311)  Resp: 18 (02/19/24 1310)  BP: 127/89 (02/19/24 1310)  SpO2: 96 % (02/19/24 1310) Vital Signs (24h Range):  Temp:  [97.7 °F (36.5 °C)-97.8 °F (36.6 °C)] 97.8 °F (36.6 °C)  Pulse:  [52-80] 59  Resp:  [15-18] 18  SpO2:  [96 %-98 %] 96 %  BP: (127-143)/(63-89) 127/89     Weight: 86.2 kg (190 lb)  Body mass index is 25.07 kg/m².     Physical Exam  Vitals and nursing note reviewed.   Constitutional:       General: He is not in acute distress.     Appearance: Normal appearance. He is not ill-appearing.   HENT:      Head: Normocephalic and atraumatic.      Mouth/Throat:      Mouth: Mucous membranes are moist.      Comments: Poor dentition  Cardiovascular:      Rate and Rhythm: Regular rhythm. Bradycardia present.      Pulses: Normal pulses.      Heart sounds: Normal heart sounds. No murmur heard.  Pulmonary:      Effort: Pulmonary effort is normal.      Breath sounds: Normal breath sounds. No wheezing, rhonchi or rales.   Abdominal:      General: Bowel sounds are normal. There is no distension.      Palpations: Abdomen is soft.      Tenderness: There is abdominal tenderness (mild, generalized). There is no guarding or rebound.   Musculoskeletal:         General: No tenderness.      Right lower leg: No  "edema.      Left lower leg: No edema.      Comments: Tremor note BL arms which is at baseline per Pt and wife   Skin:     General: Skin is warm.      Capillary Refill: Capillary refill takes less than 2 seconds.      Findings: No erythema or rash.   Neurological:      General: No focal deficit present.      Mental Status: He is alert.      Comments: Oriented to person, place, and mostly time (states the wrong year)          Significant Labs: All pertinent labs within the past 24 hours have been reviewed.  CBC:   Recent Labs   Lab 02/19/24  1125   WBC 6.67   HGB 7.7*   HCT 23.9*   *     Lactic Acid: No results for input(s): "LACTATE" in the last 48 hours.  POCT Glucose: No results for input(s): "POCTGLUCOSE" in the last 48 hours.  Recent Lab Results         02/19/24  1231   02/19/24  1125   02/19/24  1119   02/19/24  1117        Anion Gap   13           Baso #   0.01           Basophil %   0.1           BUN   52           Calcium   9.3           Chloride   112           CO2   18           Creatinine   7.4           Differential Method   Automated           eGFR   7.4           Eos #   0.2           Eos %   2.8           Glucose   103           Gran # (ANC)   4.7           Gran %   71.3           Hematocrit   23.9           Hemoglobin   7.7           Immature Grans (Abs)   0.11  Comment: Mild elevation in immature granulocytes is non specific and   can be seen in a variety of conditions including stress response,   acute inflammation, trauma and pregnancy. Correlation with other   laboratory and clinical findings is essential.             Immature Granulocytes   1.6           Lymph #   1.4           Lymph %   20.8           MCH   25.9           MCHC   32.2           MCV   81           Mono #   0.2           Mono %   3.4           MPV   SEE COMMENT  Comment: Result not available.           nRBC   0           QRS Duration 106     108         [facility] QTC Calculation 517     453         Platelet Count   118           " Potassium   4.0           RBC   2.97           RDW   17.8           Sodium   143           Troponin I       0.055  Comment: The reference interval for Troponin I represents the 99th percentile   cutoff   for our facility and is consistent with 3rd generation assay   performance.         WBC   6.67                 Significant Imaging: I have reviewed all pertinent imaging results/findings within the past 24 hours.

## 2024-02-19 NOTE — OP NOTE
OPERATIVE REPORT    Patient: Ibrahima Phelps  Surgery Date: 02/19/2024  Surgeon(s) and Role:     * JUANI Melendez III, MD - Primary     * Han Acosta MD - Fellow     * Tory Matute MD - Resident        Pre-op Diagnosis:  Arteriovenous fistula stenosis, subsequent encounter [T82.858D]     Post-op Diagnosis:  Post-Op Diagnosis Codes:     * Arteriovenous fistula stenosis, subsequent encounter [T82.858D]     PROCEDURES:     PTA, L AVF (7x60 cephalic arch, 8 x 80 proximally)  Conscious Sedation     Anesthesia: RN IV Sedation     Operative Findings: >90% stenosis in both locations, < 20% after with much improved thrill     Estimated Blood Loss: 5cc       Indications:  Ibrahima Phelps is a with history of previously transplanted kidneys x2, now with CKD 5 with plan to transition to HD.  She underwent previous left upper extremity fistula creation, now planned for a fistulogram with possible intervention. The risks, benefits, alternatives of the procedure were discussed with the patient who wished to proceed with the procedure and gave written consent.    Operative Detail: The patient was brought to the cath lab and placed on the cath lab room table in supine position.  The left upper extremity AV fistula was prepped and draped in the usual sterile fashion.  A surgical time-out was performed.  The access site was infiltrated with lidocaine and was accessed with a micropuncture set and fistulogram performed.  This demonstrated multiple stenoses within the midportion of the fistula as well as at the cephalic arch. Given these findings, the decision was made to intervene.  An 0.35 stiff angled glidewire was used to cross the lesion into the subclavian vein.  The 4 [place] sheath was exchanged for a 6 [place] short sheath.  A fistulogram was performed which demonstrate multiple portions of greater than 90% stenosis within the proximal AV fistula, and 90% stenosis within the cephalic arch.  A angioplasty was performed at the  proximal portion of the fistula with a initially a 6 x 20 mm Prichard balloon.  There was minimal waste present.  Next an 8 x 80 mm Ultraverse balloon was insufflated to the nominal pressure for a period of 2 minutes.  There was a significant waist present.  Follow-up angiogram demonstrated resolution of the stenosis with less than 20% residual.  There was still pulsatility within the AV fistula.  A 7 by 60 mm Ultraverse balloon was then used to perform an angioplasty of the cephalic arch.  There was a significant waist present.  A balloon was held to the nominal pressure for 2 minutes.  Follow-up angiogram demonstrated resolution of the stenosis with less than 20%.  The fistula was no longer pulsatile had a nice thrill.     All catheters and guidewires removed hemostasis was achieved with a Monocryl U-stitch. A sterile dressing was applied and the patient liberated from anesthesia and was taken recovery area in stable condition.     MODERATE SEDATION IN CATH LAB   I was present for moderate sedation, and monitored the patients cardio respiratory functions during the moderate sedation   See nurses notes for Intra-service start and end times and for the log of the name of the RN who administered the medicines for the moderate sedation, including their doseage and route.     Time of sedation: 30 minutes      Dr. Almeida was present and scrubbed for all aspects of the case.    All instrument and sponge counts were confirmed correct. The family was notified of the results of the surgery afterwards.      After the procedure was complete, still in the cath lab,   the pt became unresponsive, BP never low, transiently bagged, began to respond appropriately.   DDx included seizure,  protamine reaction, sedation deeper than anticipated, or cardiac event.

## 2024-02-19 NOTE — ASSESSMENT & PLAN NOTE
Admitted to vascular surgery following a period of unresponsiveness after an outpatient fistulogram was performed. About 30 mins after protamine was given to reverse the heparin, Pt became unresponsive; HR noted to be in the 40s  BP not obtained during the episode but was obtained about 2 mins later and noted to be 120s/60s. Maintained own airway and O2 sats. Pt was answering questions appropriately after the event  Has a h/o tachy-loan syndrome and pAF; known to Dr. Coleman; last seen in clinic October 2023; PPM was discussed however because Pt was having asymptomatic bradycardia at the time, no plans for PPM insertion were made  Wife notes that Pt has been having episodes similar to today's at home for about 1.5 years; denies urinary or fecal incontinence or tongue biting with these episodes; Pt has an essential tremor and always has BL arm shaking which is unchanged during these episodes     Cause of syncope likely symptomatic bradycardia  Remains in sinus bradycardia with HR 45-53 on telemetry    - hold metoprolol and amiodarone given bradycardia  - remain on telemetry  - EKG prn  - spoke with cardiology, consulted EP for evaluation of possible PPM given now symptomatic bradycardia  - recent TTE in November 2023 showed preserved EF, some diastolic dysfunction; does not appear in CHF exacerbation exam; will hold on repeating for now  - episodes seem more in keeping with syncope from symptomatic bradycardia than seizure episode; no personal or FHx of the same; will hold on EEG for now

## 2024-02-19 NOTE — Clinical Note
16 ml of contrast were injected throughout the case. 84 mL of contrast was the total wasted during the case. 100 mL was the total amount used during the case.

## 2024-02-20 LAB
ABO + RH BLD: NORMAL
ANION GAP SERPL CALC-SCNC: 9 MMOL/L (ref 8–16)
BASOPHILS # BLD AUTO: 0 K/UL (ref 0–0.2)
BASOPHILS NFR BLD: 0 % (ref 0–1.9)
BLD GP AB SCN CELLS X3 SERPL QL: NORMAL
BLD PROD TYP BPU: NORMAL
BLOOD UNIT EXPIRATION DATE: NORMAL
BLOOD UNIT TYPE CODE: 7300
BLOOD UNIT TYPE: NORMAL
BNP SERPL-MCNC: 2640 PG/ML (ref 0–99)
BUN SERPL-MCNC: 57 MG/DL (ref 8–23)
CALCIUM SERPL-MCNC: 9.3 MG/DL (ref 8.7–10.5)
CHLORIDE SERPL-SCNC: 113 MMOL/L (ref 95–110)
CO2 SERPL-SCNC: 20 MMOL/L (ref 23–29)
CODING SYSTEM: NORMAL
CREAT SERPL-MCNC: 7.8 MG/DL (ref 0.5–1.4)
CROSSMATCH INTERPRETATION: NORMAL
DIFFERENTIAL METHOD BLD: ABNORMAL
DISPENSE STATUS: NORMAL
EOSINOPHIL # BLD AUTO: 0 K/UL (ref 0–0.5)
EOSINOPHIL NFR BLD: 0 % (ref 0–8)
ERYTHROCYTE [DISTWIDTH] IN BLOOD BY AUTOMATED COUNT: 17.7 % (ref 11.5–14.5)
EST. GFR  (NO RACE VARIABLE): 6.9 ML/MIN/1.73 M^2
FERRITIN SERPL-MCNC: 634 NG/ML (ref 20–300)
GLUCOSE SERPL-MCNC: 113 MG/DL (ref 70–110)
HCT VFR BLD AUTO: 21.2 % (ref 40–54)
HGB BLD-MCNC: 6.7 G/DL (ref 14–18)
IMM GRANULOCYTES # BLD AUTO: 0.02 K/UL (ref 0–0.04)
IMM GRANULOCYTES NFR BLD AUTO: 0.5 % (ref 0–0.5)
IRON SERPL-MCNC: 26 UG/DL (ref 45–160)
LYMPHOCYTES # BLD AUTO: 0.5 K/UL (ref 1–4.8)
LYMPHOCYTES NFR BLD: 12.7 % (ref 18–48)
MAGNESIUM SERPL-MCNC: 2.1 MG/DL (ref 1.6–2.6)
MCH RBC QN AUTO: 25.2 PG (ref 27–31)
MCHC RBC AUTO-ENTMCNC: 31.6 G/DL (ref 32–36)
MCV RBC AUTO: 80 FL (ref 82–98)
MONOCYTES # BLD AUTO: 0.1 K/UL (ref 0.3–1)
MONOCYTES NFR BLD: 3.2 % (ref 4–15)
NEUTROPHILS # BLD AUTO: 3.4 K/UL (ref 1.8–7.7)
NEUTROPHILS NFR BLD: 83.6 % (ref 38–73)
NRBC BLD-RTO: 0 /100 WBC
NUM UNITS TRANS PACKED RBC: NORMAL
PHOSPHATE SERPL-MCNC: 4.4 MG/DL (ref 2.7–4.5)
PLATELET # BLD AUTO: 130 K/UL (ref 150–450)
PMV BLD AUTO: 13.2 FL (ref 9.2–12.9)
POCT GLUCOSE: 105 MG/DL (ref 70–110)
POCT GLUCOSE: 110 MG/DL (ref 70–110)
POCT GLUCOSE: 115 MG/DL (ref 70–110)
POCT GLUCOSE: 122 MG/DL (ref 70–110)
POTASSIUM SERPL-SCNC: 5.3 MMOL/L (ref 3.5–5.1)
RBC # BLD AUTO: 2.66 M/UL (ref 4.6–6.2)
SATURATED IRON: 14 % (ref 20–50)
SODIUM SERPL-SCNC: 142 MMOL/L (ref 136–145)
SPECIMEN OUTDATE: NORMAL
TACROLIMUS BLD-MCNC: 3.4 NG/ML (ref 5–15)
TOTAL IRON BINDING CAPACITY: 192 UG/DL (ref 250–450)
TRANSFERRIN SERPL-MCNC: 130 MG/DL (ref 200–375)
WBC # BLD AUTO: 4.02 K/UL (ref 3.9–12.7)

## 2024-02-20 PROCEDURE — 36415 COLL VENOUS BLD VENIPUNCTURE: CPT | Mod: XB | Performed by: STUDENT IN AN ORGANIZED HEALTH CARE EDUCATION/TRAINING PROGRAM

## 2024-02-20 PROCEDURE — 83540 ASSAY OF IRON: CPT | Performed by: STUDENT IN AN ORGANIZED HEALTH CARE EDUCATION/TRAINING PROGRAM

## 2024-02-20 PROCEDURE — 25000003 PHARM REV CODE 250

## 2024-02-20 PROCEDURE — 25000003 PHARM REV CODE 250: Performed by: STUDENT IN AN ORGANIZED HEALTH CARE EDUCATION/TRAINING PROGRAM

## 2024-02-20 PROCEDURE — P9016 RBC LEUKOCYTES REDUCED: HCPCS

## 2024-02-20 PROCEDURE — 36415 COLL VENOUS BLD VENIPUNCTURE: CPT

## 2024-02-20 PROCEDURE — 83880 ASSAY OF NATRIURETIC PEPTIDE: CPT | Performed by: STUDENT IN AN ORGANIZED HEALTH CARE EDUCATION/TRAINING PROGRAM

## 2024-02-20 PROCEDURE — 25000003 PHARM REV CODE 250: Performed by: HOSPITALIST

## 2024-02-20 PROCEDURE — 63600175 PHARM REV CODE 636 W HCPCS: Performed by: STUDENT IN AN ORGANIZED HEALTH CARE EDUCATION/TRAINING PROGRAM

## 2024-02-20 PROCEDURE — 63600175 PHARM REV CODE 636 W HCPCS: Performed by: HOSPITALIST

## 2024-02-20 PROCEDURE — 83735 ASSAY OF MAGNESIUM: CPT | Performed by: STUDENT IN AN ORGANIZED HEALTH CARE EDUCATION/TRAINING PROGRAM

## 2024-02-20 PROCEDURE — 85025 COMPLETE CBC W/AUTO DIFF WBC: CPT | Performed by: STUDENT IN AN ORGANIZED HEALTH CARE EDUCATION/TRAINING PROGRAM

## 2024-02-20 PROCEDURE — 80197 ASSAY OF TACROLIMUS: CPT

## 2024-02-20 PROCEDURE — 86850 RBC ANTIBODY SCREEN: CPT

## 2024-02-20 PROCEDURE — 82728 ASSAY OF FERRITIN: CPT | Performed by: STUDENT IN AN ORGANIZED HEALTH CARE EDUCATION/TRAINING PROGRAM

## 2024-02-20 PROCEDURE — 80048 BASIC METABOLIC PNL TOTAL CA: CPT | Performed by: STUDENT IN AN ORGANIZED HEALTH CARE EDUCATION/TRAINING PROGRAM

## 2024-02-20 PROCEDURE — 20600001 HC STEP DOWN PRIVATE ROOM

## 2024-02-20 PROCEDURE — 86920 COMPATIBILITY TEST SPIN: CPT

## 2024-02-20 PROCEDURE — 84100 ASSAY OF PHOSPHORUS: CPT | Performed by: STUDENT IN AN ORGANIZED HEALTH CARE EDUCATION/TRAINING PROGRAM

## 2024-02-20 PROCEDURE — 94640 AIRWAY INHALATION TREATMENT: CPT

## 2024-02-20 PROCEDURE — 30233N1 TRANSFUSION OF NONAUTOLOGOUS RED BLOOD CELLS INTO PERIPHERAL VEIN, PERCUTANEOUS APPROACH: ICD-10-PCS | Performed by: SURGERY

## 2024-02-20 PROCEDURE — 36430 TRANSFUSION BLD/BLD COMPNT: CPT

## 2024-02-20 PROCEDURE — 99223 1ST HOSP IP/OBS HIGH 75: CPT | Mod: GC,,, | Performed by: INTERNAL MEDICINE

## 2024-02-20 RX ORDER — SODIUM CHLORIDE 9 MG/ML
INJECTION, SOLUTION INTRAVENOUS ONCE
Status: CANCELLED | OUTPATIENT
Start: 2024-02-20 | End: 2024-02-20

## 2024-02-20 RX ORDER — SODIUM CHLORIDE 0.9 % (FLUSH) 0.9 %
10 SYRINGE (ML) INJECTION
Status: DISCONTINUED | OUTPATIENT
Start: 2024-02-20 | End: 2024-02-21 | Stop reason: HOSPADM

## 2024-02-20 RX ORDER — HYDROCODONE BITARTRATE AND ACETAMINOPHEN 500; 5 MG/1; MG/1
TABLET ORAL
Status: DISCONTINUED | OUTPATIENT
Start: 2024-02-20 | End: 2024-02-21 | Stop reason: HOSPADM

## 2024-02-20 RX ORDER — ISOSORBIDE MONONITRATE 60 MG/1
120 TABLET, EXTENDED RELEASE ORAL DAILY
Status: DISCONTINUED | OUTPATIENT
Start: 2024-02-20 | End: 2024-02-21 | Stop reason: HOSPADM

## 2024-02-20 RX ORDER — FUROSEMIDE 10 MG/ML
120 INJECTION INTRAMUSCULAR; INTRAVENOUS ONCE
Status: COMPLETED | OUTPATIENT
Start: 2024-02-20 | End: 2024-02-20

## 2024-02-20 RX ORDER — HYDRALAZINE HYDROCHLORIDE 50 MG/1
50 TABLET, FILM COATED ORAL 3 TIMES DAILY
Status: DISCONTINUED | OUTPATIENT
Start: 2024-02-20 | End: 2024-02-21 | Stop reason: HOSPADM

## 2024-02-20 RX ORDER — NIFEDIPINE 60 MG/1
60 TABLET, EXTENDED RELEASE ORAL 2 TIMES DAILY
Status: DISCONTINUED | OUTPATIENT
Start: 2024-02-20 | End: 2024-02-21 | Stop reason: HOSPADM

## 2024-02-20 RX ORDER — AMIODARONE HYDROCHLORIDE 200 MG/1
200 TABLET ORAL DAILY
Status: DISCONTINUED | OUTPATIENT
Start: 2024-02-20 | End: 2024-02-21 | Stop reason: HOSPADM

## 2024-02-20 RX ORDER — GABAPENTIN 100 MG/1
100 CAPSULE ORAL DAILY PRN
Status: DISCONTINUED | OUTPATIENT
Start: 2024-02-20 | End: 2024-02-21 | Stop reason: HOSPADM

## 2024-02-20 RX ADMIN — TACROLIMUS 3 MG: 1 CAPSULE ORAL at 06:02

## 2024-02-20 RX ADMIN — NIFEDIPINE 60 MG: 60 TABLET, FILM COATED, EXTENDED RELEASE ORAL at 08:02

## 2024-02-20 RX ADMIN — NIFEDIPINE 60 MG: 60 TABLET, FILM COATED, EXTENDED RELEASE ORAL at 10:02

## 2024-02-20 RX ADMIN — SODIUM ZIRCONIUM CYCLOSILICATE 10 G: 5 POWDER, FOR SUSPENSION ORAL at 10:02

## 2024-02-20 RX ADMIN — APIXABAN 5 MG: 5 TABLET, FILM COATED ORAL at 08:02

## 2024-02-20 RX ADMIN — CALCIUM ACETATE 667 MG: 667 CAPSULE ORAL at 10:02

## 2024-02-20 RX ADMIN — PREDNISONE 5 MG: 2.5 TABLET ORAL at 09:02

## 2024-02-20 RX ADMIN — HYDRALAZINE HYDROCHLORIDE 50 MG: 50 TABLET ORAL at 08:02

## 2024-02-20 RX ADMIN — TACROLIMUS 3 MG: 1 CAPSULE ORAL at 08:02

## 2024-02-20 RX ADMIN — FUROSEMIDE 120 MG: 10 INJECTION, SOLUTION INTRAVENOUS at 10:02

## 2024-02-20 RX ADMIN — NIFEDIPINE 60 MG: 60 TABLET, FILM COATED, EXTENDED RELEASE ORAL at 12:02

## 2024-02-20 RX ADMIN — AMIODARONE HYDROCHLORIDE 200 MG: 200 TABLET ORAL at 07:02

## 2024-02-20 RX ADMIN — PANTOPRAZOLE SODIUM 40 MG: 40 TABLET, DELAYED RELEASE ORAL at 08:02

## 2024-02-20 RX ADMIN — TIOTROPIUM BROMIDE INHALATION SPRAY 2 PUFF: 1.56 SPRAY, METERED RESPIRATORY (INHALATION) at 10:02

## 2024-02-20 RX ADMIN — HYDRALAZINE HYDROCHLORIDE 50 MG: 50 TABLET ORAL at 02:02

## 2024-02-20 RX ADMIN — HYDRALAZINE HYDROCHLORIDE 50 MG: 50 TABLET ORAL at 10:02

## 2024-02-20 RX ADMIN — FLUTICASONE FUROATE AND VILANTEROL TRIFENATATE 1 PUFF: 100; 25 POWDER RESPIRATORY (INHALATION) at 10:02

## 2024-02-20 RX ADMIN — FUROSEMIDE 80 MG: 80 TABLET ORAL at 07:02

## 2024-02-20 RX ADMIN — CALCIUM ACETATE 667 MG: 667 CAPSULE ORAL at 12:02

## 2024-02-20 RX ADMIN — METOPROLOL SUCCINATE 12.5 MG: 25 TABLET, EXTENDED RELEASE ORAL at 07:02

## 2024-02-20 RX ADMIN — ISOSORBIDE MONONITRATE 120 MG: 60 TABLET, EXTENDED RELEASE ORAL at 08:02

## 2024-02-20 RX ADMIN — ATORVASTATIN CALCIUM 80 MG: 20 TABLET, FILM COATED ORAL at 10:02

## 2024-02-20 RX ADMIN — CALCIUM ACETATE 667 MG: 667 CAPSULE ORAL at 04:02

## 2024-02-20 RX ADMIN — HYDRALAZINE HYDROCHLORIDE 50 MG: 50 TABLET ORAL at 12:02

## 2024-02-20 RX ADMIN — CLOPIDOGREL BISULFATE 75 MG: 75 TABLET ORAL at 08:02

## 2024-02-20 RX ADMIN — THERA TABS 1 TABLET: TAB at 08:02

## 2024-02-20 RX ADMIN — FUROSEMIDE 120 MG: 10 INJECTION, SOLUTION INTRAVENOUS at 04:02

## 2024-02-20 NOTE — PROGRESS NOTES
[facility] Hospital Medicine  Progress Note     Admitting Team: IM-C  Attending Physician: Nadira Shaffer MD  Date of Admit: 2/19/2024     Chief Complaint      Syncope x 1 day     Subjective:        History of Present Illness:   68 year old male with ESRD s/p kidney transplant x2 (1992, 2005) on chronic immunosuppressive medication, CAD s/p PCI (2006), HTN, HLD, Chronic diastolic CHF, tachycardia-bradycardia syndrome with periods of pAF admitted to vascular surgery. Presented for outpatient fistulogram (recently had the fistula created on 12/18/23) with plans to start HD however had a period of unresponsiveness after the procedure. Received protamine for heparin reversal and 30 mins after the protamine was given, he became unresponsive with unfocused eyes; also noted to have left arm shaking. HR was low in the 40s but O2 sats were normal; BP was not obtained during the episode but was obtained about 2 minutes later and it was norml. Pt was drowsy but arousable and able to answer questions after the episode. Pt seen at bedside on third floor; wife at bedside. Wife reports he's had episodes similar to this for about 1.5 years however he has never been evaluated for them. Wife denies associated urinary or fecal incontinence, tongue biting, or whole body shaking associated with these episodes. Pt has an essential tremor and always has BL arm/hand shaking which is at it's baseline per Pt and wife. No personal or FHx seizure. Does not measure his BP or HR at home or during these episodes of unresponsiveness. Follows with Dr. Coleman for his pAF; also has tachy-loan syndrome but does not have a PPM; Pt and wife states PPM has been discussed with them previously but there were no plans to insert one as he was previously asymptomatic. Reports feeling well prior to his fistulogram; denies fevers, chills, cough, nausea, vomiting, diarrhea, or dysuria.      He was transferred to medicine this AM. Denies  "any complaints. Does report SOB & orthopnea the past several months that has been stable. Denies weight gain or edema. Denies syncopal episodes other than the event after procedure yesterday. Denies palpitations. Refusing dialysis. Wants to go home.      Past Medical History:       Past Medical History:   Diagnosis Date    Atrial fibrillation      CAD (coronary artery disease) 2006     MI in 2006    CHF (congestive heart failure) 2017    CKD (chronic kidney disease) stage 3, GFR 30-59 ml/min       Dr. Latif.  in transplanted kidney    CKD (chronic kidney disease) stage 5, GFR less than 15 ml/min 02/02/2024    COVID-19 02/07/2023    Diverticulosis      Hyperlipidemia      Hypertension      Keloid cicatrix      Metabolic bone disease      Other emphysema 10/01/2019    Pericarditis      S/P kidney transplant 1992     x2 (1992 & 2005),    Thrombocytopenia      Thyroid disease      Tobacco use 09/05/2014         Past Surgical History:        Past Surgical History:   Procedure Laterality Date    AV FISTULA PLACEMENT Left 12/18/2023     Procedure: CREATION, AV FISTULA;  Surgeon: JUANI Melendez III, MD;  Location: [facility] OR [address] [place] [address];  Service: Vascular;  Laterality: Left;  LUE AVF creation vs AVG insertion    CARDIOVERSION   04/30/15    CHOLECYSTECTOMY        COLONOSCOPY   April 20, 2011     Diverticulosis, repeat recommended in 3 yrs., repeat colonoscopy 2014 revealed 2 polyps.  He should return in 5 years.    COLONOSCOPY N/A 3/13/2020     Procedure: COLONOSCOPY;  Surgeon: Chon Casper MD;  Location: [facility] (4TH FLR);  Service: Endoscopy;  Laterality: N/A;  ok to hold coumadin x5days-see telephone encounter 2/4/20-tb    COLONOSCOPY N/A 2/4/2021     Procedure: COLONOSCOPY;  Surgeon: Chon Casper MD;  Location: [facility] (4TH FLR);  Service: Endoscopy;  Laterality: N/A;  Eliquis - per Dr. GAVIN Blunt ok to hold x 2 days and "restarted asap"- ERW  last prep "poor", [id number] ordered/ Pt requests Dr. Casper  prep " ins. mailed - COVID screening on 2/1/21 PCW- ERW    COLONOSCOPY N/A 3/3/2021     Procedure: COLONOSCOPY;  Surgeon: Chon Casper MD;  Location: [facility] (4TH FLR);  Service: Endoscopy;  Laterality: N/A;  Patient with his second poor bowel pre and has poor prep today.  If patient not intersted or can't get colonoscopy tomorrow will need constipation bowel prep and will need to restart his Eliquis today.Thanks,Chon     per Dr Lopez to hold Eliquis 2 days prior      COVID     CORONARY ANGIOPLASTY WITH STENT PLACEMENT   9/13/2006    IRRIGATION AND DEBRIDEMENT Right 8/30/2023     Procedure: IRRIGATION AND DEBRIDEMENT, RIGHT MIDDLE FINGER;  Surgeon: Martin Larsen MD;  Location: [facility] OR 2ND FLR;  Service: Orthopedics;  Laterality: Right;    KIDNEY TRANSPLANT   2005    PARATHYROIDECTOMY        TREATMENT OF CARDIAC ARRHYTHMIA N/A 3/28/2022     Procedure: CARDIOVERSION;  Surgeon: Migue Blunt MD;  Location: [facility] EP LAB;  Service: Cardiology;  Laterality: N/A;  AF, DCC, MAC, GP, 3 PREP*RODRIGO deferred pt 100% compliant*         Allergies:        Review of patient's allergies indicates:   Allergen Reactions    Ace inhibitors Swelling    Verapamil Other (See Comments)       Other reaction(s): Unknown    Povidone-iodine Itching         Home Medications:          Prior to Admission medications    Medication Sig Start Date End Date Taking? Authorizing Provider   acetaminophen (TYLENOL) 500 MG tablet Take 1 tablet (500 mg total) by mouth every 6 (six) hours as needed for Pain. 12/25/22   Yes Nato Portillo MD   amiodarone (PACERONE) 200 MG Tab Take 1 tablet (200 mg total) by mouth once daily. 9/20/23 9/19/24 Yes BE Parmar MD   atorvastatin (LIPITOR) 40 MG tablet Take 2 tablets (80 mg total) by mouth once daily. 11/26/23 11/25/24 Yes Catherine Sarmiento PA-C   b complex vitamins (B COMPLEX-VITAMIN B12) tablet Take 1 tablet by mouth once daily. 6/29/23   Yes Marcos Brunson MD   calcium  acetate,phosphat bind, (PHOSLO) 667 mg capsule TAKE 1 CAPSULE BY MOUTH THREE TIMES A DAY WITH MEALS AND SNACKS 11/15/23   Yes Emre Latif MD   calcium carbonate (CALCIUM 600 ORAL) Take 1 tablet by mouth 2 (two) times a day.     Yes Provider, Historical   CHOLECALCIFEROL, VITAMIN D3, ORAL Take 2 tablets by mouth 2 (two) times daily.     Yes Provider, Historical   clopidogreL (PLAVIX) 75 mg tablet Take 1 tablet (75 mg total) by mouth once daily. 10/21/23 10/20/24 Yes Randal Beaver MD   doxazosin (CARDURA) 4 MG tablet Take 1 tablet (4 mg total) by mouth every 12 (twelve) hours. 7/31/23   Yes Anatoliy Colindres MD   famotidine (PEPCID) 20 MG tablet Take 1 tablet (20 mg total) by mouth 2 (two) times daily. 8/1/23   Yes Emre Latif MD   furosemide (LASIX) 80 MG tablet Take 1 tablet (80 mg total) by mouth every morning. 11/6/23 11/5/24 Yes Emre Latif MD   hydrALAZINE (APRESOLINE) 50 MG tablet TAKE 1 TABLET (50 MG TOTAL) BY MOUTH 3 (THREE) TIMES DAILY. 10/24/22   Yes Emre Latif MD   isosorbide mononitrate (IMDUR) 30 MG 24 hr tablet Take 4 tablets (120 mg total) by mouth once daily. 11/28/23 11/27/24 Yes Anatoliy Colindres MD   metoprolol succinate (TOPROL-XL) 25 MG 24 hr tablet Take 0.5 tablets (12.5 mg total) by mouth 2 (two) times daily. 11/26/23 11/25/24 Yes Catherine Sarmiento PA-C   multivitamin (THERAGRAN) per tablet Take 1 tablet by mouth Daily. 6/11/12   Yes Provider, Historical   NIFEdipine (PROCARDIA-XL) 60 MG (OSM) 24 hr tablet Take 1 tablet (60 mg total) by mouth 2 (two) times a day. 7/31/23 7/30/24 Yes Anatoliy Colindres MD   potassium chloride (KLOR-CON) 10 MEQ TbSR Take 1 tablet (10 mEq total) by mouth daily as needed (to be taken with lasix)., 11/2/23   Yes Anatoliy Colindres MD   predniSONE (DELTASONE) 5 MG tablet Take 1 tablet (5 mg total) by mouth once daily. 2/8/24   Yes Emre Latif MD   tacrolimus (PROGRAF) 1 MG Cap TAKE 3 CAPSULES (3 MG TOTAL) BY  MOUTH EVERY 12 (TWELVE) HOURS. 1/23/24 1/22/25 Yes Emre Latif MD   tiotropium bromide (SPIRIVA RESPIMAT) 1.25 mcg/actuation inhaler Inhale 2 puffs into the lungs once daily. Controller. Use this inhaler every day. 11/26/23 11/25/24 Yes Catherine Sarmiento PA-C   traZODone (DESYREL) 50 MG tablet Take 1 tablet (50 mg total) by mouth nightly as needed for Insomnia. 2/2/24 2/1/25 Yes Anatoliy Colindres MD   albuterol (VENTOLIN HFA) 90 mcg/actuation inhaler Inhale 2 puffs into the lungs every 6 (six) hours as needed for Wheezing or Shortness of Breath. Rescue 4/18/22     Connie Magdaleno MD   albuterol-ipratropium (DUO-NEB) 2.5 mg-0.5 mg/3 mL nebulizer solution Take 3 mLs by nebulization every 6 (six) hours as needed for Wheezing. Rescue. Can be used in place of albuterol inhaler. 11/26/23 11/25/24   Catherine Sarmiento PA-C   apixaban (ELIQUIS) 5 mg Tab Take 1 tablet (5 mg total) by mouth 2 (two) times daily. 7/31/23     Hussain Vail MD PhD   fexofenadine HCl (ALLEGRA ORAL) Take 1 tablet by mouth daily as needed (allergies).       Provider, Historical   fluticasone furoate-vilanteroL (BREO) 100-25 mcg/dose diskus inhaler Inhale 1 puff into the lungs once daily. Controller. Use this inhaler every day.  Patient not taking: Reported on 2/6/2024 11/27/23     Catherine Sarmiento PA-C   fluticasone propionate (FLONASE) 50 mcg/actuation nasal spray 1 spray (50 mcg total) by Each Nostril route daily as needed for Allergies. 10/20/23     Randal Beaver MD   FLUZONE HIGHDOSE QUAD 23-24  mcg/0.7 mL Syrg   9/1/23     Provider, Historical   furosemide (LASIX) 20 MG tablet One po QAM, and one po at lunchtime if swelling prn 11/1/23     Emre Latif MD   gabapentin (NEURONTIN) 100 MG capsule Take 1 capsule (100 mg total) by mouth as needed (pain). 10/20/23 10/19/24   Randal Beaver MD   HYDROcodone-acetaminophen (NORCO) 5-325 mg per tablet Take 1 tablet by mouth every 6 (six) hours as needed for Pain.  12/18/23     Juan Vega MD   MITIGARE 0.6 mg Cap TAKE 1 CAPSULE BY MOUTH TWICE A DAY AS NEEDED GOUT FLARE UP TO 3 DAYS 7/11/23     Emre Latif MD   nitroGLYCERIN (NITROSTAT) 0.4 MG SL tablet Place 1 tablet (0.4 mg total) under the tongue every 5 (five) minutes as needed for Chest pain.  Patient not taking: Reported on 2/6/2024 10/20/23 10/19/24   Randal Beaver MD   omeprazole (PRILOSEC) 20 MG capsule Take 1 capsule (20 mg total) by mouth daily as needed (heartburn). 10/20/23 10/19/24   Randal Beaver MD   paricalcitoL (ZEMPLAR) 1 MCG capsule TAKE 1 CAPSULE ON MONDAY, WEDNESDAY AND FRIDAY 8/1/23     Emre Latif MD   PFIZER COVID BIVAL,12Y UP,,PF, 30 mcg/0.3 mL injection Inject into the muscle. 9/1/23     Provider, Historical   pulse oximeter (PULSE OXIMETER) device by Apply Externally route 2 (two) times a day. Use twice daily at 8 AM and 3 PM and record the value in MyChart as directed. 2/10/23     Kt Luna III, MD   SPIKEVAX [id number],12Y UP,,PF, 50 mcg/0.5 mL injection   9/22/23     Provider, Historical         Family History:        Family History   Problem Relation Age of Onset    No Known Problems Sister      No Known Problems Brother      Thyroid disease Mother           s/p surgery    Heart disease Father           had pacemaker    No Known Problems Sister      Kidney failure Sister      Kidney disease Sister      No Known Problems Sister      Kidney disease Brother      Kidney failure Brother           s/p transplant    Diabetes Mellitus Neg Hx      Stroke Neg Hx      Heart attack Neg Hx      Cancer Neg Hx      Celiac disease Neg Hx      Cirrhosis Neg Hx      Colon cancer Neg Hx      Esophageal cancer Neg Hx      Inflammatory bowel disease Neg Hx      Rectal cancer Neg Hx      Stomach cancer Neg Hx      Ulcerative colitis Neg Hx      Liver disease Neg Hx      Liver cancer Neg Hx      Crohn's disease Neg Hx      Melanoma Neg Hx           Social History:  Social  History            Tobacco Use    Smoking status: Every Day       Current packs/day: 0.00       Average packs/day: 0.5 packs/day for 40.0 years (20.0 ttl pk-yrs)       Types: Cigarettes       Start date: 1982       Last attempt to quit: 2022       Years since quittin.9    Smokeless tobacco: Never    Tobacco comments:       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       Patient is currently retired   Substance Use Topics    Alcohol use: No    Drug use: No         Review of Systems:  As per HPI  General: no fever, no chills, no weight loss, no fatigue  Nose, Sinuses: no rhinorrhea, no facial pain, no epistaxis  Cardiovascular: no chest pain, no orthopnea, no dyspnea  Respiratory: no cough, no wheezes, no chest pain  Urinary: no dysuria, no increased frequency, no hematuria  Hematologic: no easy bruising, no overt bleeding, no petechiae  Endocrine: no heat intolerance, no cold intolerance, no polyuria  Neurologic: no seizures, no tremors, no numbness  Psychiatric: no hallucination, no depression, no suicidal ideation  Skin: no rashes, no pruritus, no pallor  All other systems reviewed & are negative.         Objective:   Last 24 Hour Vital Signs:  BP  Min: 132/60  Max: 185/86  Temp  Av °F (36.7 °C)  Min: 97.3 °F (36.3 °C)  Max: 98.7 °F (37.1 °C)  Pulse  Av.2  Min: 52  Max: 64  Resp  Av.8  Min: 16  Max: 18  SpO2  Av.9 %  Min: 95 %  Max: 98 %  Body mass index is 25.07 kg/m².  I/O last 3 completed shifts:  In: -   Out: 150 [Urine:150]     Physical Examination:  GEN: AAOx3, NAD  HEENT: NCAT, MMM, PERRL, EOMI, oropharynx clear  CV: RRR, no m/r, JVD  RESP: decreased breath sounds bilaterally  ABD: soft, NTND, normoactive BS, no organomegaly  EXTR: no c/c/e, 2+ distal pulses x 4, LUE AVF  NEURO: PERRL, EOMI, 5/5 motor strength all four extremities, intact sensation to light touch, no focal deficits  SKIN: no rashes, lesions, or color changes  PSYCH:  normal affect     Laboratory:  I have reviewed all pertinent laboratory data within the past 24 hours.     Other Results:  EKG (my interpretation): sinus bradycardia      Radiology:  I have reviewed all pertinent radiology imaging within the past 24 hours.     Prior records reviewed.     Assessment/Plan:      Ibrahima Phelps is a 68 y.o. male with:     Syncope  Admitted to vascular surgery following a period of unresponsiveness after an outpatient fistulogram was performed. About 30 mins after protamine was given to reverse the heparin, Pt became unresponsive; HR noted to be in the 40s  BP not obtained during the episode but was obtained about 2 mins later and noted to be 120s/60s. Maintained own airway and O2 sats. Pt was answering questions appropriately after the event  Has a h/o tachy-loan syndrome and pAF; known to Dr. Coleman; last seen in clinic October 2023; PPM was discussed however because Pt was having asymptomatic bradycardia at the time, no plans for PPM insertion were made  Wife notes that Pt has been having episodes similar to today's at home for about 1.5 years; denies urinary or fecal incontinence or tongue biting with these episodes; Pt has an essential tremor and always has BL arm shaking which is unchanged during these episodes      Cause of syncope likely transient bradycardia  Episodes seem more in keeping with syncope from symptomatic bradycardia than seizure episode; no personal or FHx of the same; will hold on EEG for now  EP consulted and recommended monitoring on home amiodarone & metoprolol 12.5 mg daily. Telemetry reviewed with stable HR and no events.     Acute on chronic diastolic heart failure  Recent TTE 11/2023:     Left Ventricle: The left ventricle is normal in size. Ventricular mass is normal. Normal wall thickness. There is concentric remodeling. Normal wall motion. There is normal systolic function with a visually estimated ejection fraction of 55 - 60%. Biplane (2D) method of  discs ejection fraction is 55%. There is diastolic dysfunction. Elevated left ventricular filling pressure.    Right Ventricle: Normal right ventricular cavity size. Systolic function is normal.    Left Atrium: Left atrium is moderately dilated.    Aortic Valve: There is moderate aortic valve sclerosis. Moderately restricted motion. There is mild to moderate stenosis. Aortic valve area by VTI is 1.34 cm². Aortic valve peak velocity is 2.92 m/s. Mean gradient is 18 mmHg. The dimensionless index is 0.41. There is mild to moderate aortic regurgitation.    Mitral Valve: There is moderate posterior mitral annular calcification present. There is mild regurgitation.    Tricuspid Valve: There is mild regurgitation.    Pulmonic Valve: There is mild regurgitation.    Pulmonary Artery: The estimated pulmonary artery systolic pressure is 45 mmHg.    IVC/SVC: Intermediate venous pressure at 8 mmHg.     CXR shows edema, BNP 2640, JVD on exam.  Secondary to progressive renal failure.  Start IV Lasix 120 mg BID.  Strict I/O's, daily weights, fluid/sodium restriction     Anemia of chronic disease       Hb 7.7 > 6.7. Secondary to renal failure. Transfuse 1 u prbcs       No overt bleeding. Monitor daily. Transfuse for Hb < 7     GERD (gastroesophageal reflux disease)  Takes omeprazole at home; not on formulary, continue pantoprazole while admitted     ESRD (end stage renal disease)  Hyperkalemia  Progressively worsening renal function since October  Cr 7.4 on admission; 7.8 today. K 5.3 today. Overloaded  Based on current GFR, CKD stage is stage 5 - GFR < 15. Monitor UOP and serial BMP and adjust therapy as needed. Renally dose meds. Avoid nephrotoxic medications and procedures.  Recently had a fistula created on 12/18/23  Still makes urine  Takes lasix 80mg daily at home  Continue paricalcitriol and phosphate binder   Renal diet   KTM consulted and offered HD inpatient but patient refuses.   Lokelma started for potassium.  Monitor  renal function panel daily. Avoid nephrotoxins. Renally dose medications.      Benign essential tremor  Long-standing history of essential tremor with BL arm/hand shaking  On metoprolol for his AF but not on other meds for tremor  Essential tremor likely the cause for the hand shaking seen during his unresponsive episode as it was unchanged from his baseline     Paroxysmal atrial fibrillation  Has previously undergone cardioversion  Resume home amiodarone 200mg daily, eliquis, metoprolol  Remain on telemetry     H/O kidney transplant  Takes tacrolimus 3mg BID, prednisone 5mg daily at home  Continue on admission  Daily tacrolimus level  KTM consulted     Mixed hyperlipidemia  Continue home statin     CAD (coronary artery disease)  S/p PCI in 2006  Continue home clopidogrel     Primary hypertension  Takes nifedipine 60mg BID, hydralazine 50mg TID, ISMN 120mg daily, doxazosin 4mg BID at home  Normotensive currently on nifedipine, hydralazine, imdur  Monitor VS q4     Nadira Shaffer MD  Hospital Medicine Staff  [facility]

## 2024-02-20 NOTE — SUBJECTIVE & OBJECTIVE
Medications:  Continuous Infusions:  Scheduled Meds:   amiodarone  200 mg Oral Daily    apixaban  5 mg Oral BID    atorvastatin  80 mg Oral Daily    calcium acetate(phosphat bind)  667 mg Oral TID WM    clopidogreL  75 mg Oral Daily    fluticasone furoate-vilanteroL  1 puff Inhalation Daily    furosemide  80 mg Oral QAM    hydrALAZINE  50 mg Oral TID    metoprolol succinate  12.5 mg Oral Daily    multivitamin  1 tablet Oral Daily    NIFEdipine  60 mg Oral BID    pantoprazole  40 mg Oral Daily    [START ON 2/21/2024] paricalcitoL  1 mcg Oral Every Mon, Wed, Fri    predniSONE  5 mg Oral Daily    sodium zirconium cyclosilicate  10 g Oral Daily    tacrolimus  3 mg Oral BID    tiotropium bromide  2 puff Inhalation Daily     PRN Meds:0.9%  NaCl infusion (for blood administration), albuterol, albuterol-ipratropium, dextrose 10%, dextrose 10%, gabapentin, glucagon (human recombinant), glucose, glucose, insulin aspart U-100, nitroGLYCERIN, traZODone     Objective:     Vital Signs (Most Recent):  Temp: 98.5 °F (36.9 °C) (02/20/24 0743)  Pulse: 60 (02/20/24 0743)  Resp: 18 (02/20/24 0743)  BP: (!) 178/66 (02/20/24 0743)  SpO2: 96 % (02/20/24 0743) Vital Signs (24h Range):  Temp:  [97.3 °F (36.3 °C)-98.5 °F (36.9 °C)] 98.5 °F (36.9 °C)  Pulse:  [52-80] 60  Resp:  [15-18] 18  SpO2:  [96 %-98 %] 96 %  BP: (127-185)/(63-89) 178/66          Physical Exam  Constitutional:       Appearance: Normal appearance.   HENT:      Head: Normocephalic.      Mouth/Throat:      Mouth: Mucous membranes are moist.   Eyes:      Pupils: Pupils are equal, round, and reactive to light.   Cardiovascular:      Rate and Rhythm: Normal rate.      Comments: L AVF w fistula (+) thrill  Pulmonary:      Effort: Pulmonary effort is normal.   Abdominal:      Palpations: Abdomen is soft.   Musculoskeletal:         General: Normal range of motion.   Skin:     General: Skin is warm and dry.      Capillary Refill: Capillary refill takes less than 2 seconds.       Comments: Puncture to L AVF soft, non-tender w/o drainage    Neurological:      Mental Status: He is alert.   Psychiatric:         Mood and Affect: Mood normal.         Behavior: Behavior normal.          Significant Labs:  CBC:   Recent Labs   Lab 02/20/24  0307   WBC 4.02   RBC 2.66*   HGB 6.7*   HCT 21.2*   *   MCV 80*   MCH 25.2*   MCHC 31.6*     CMP:   Recent Labs   Lab 02/20/24  0307   *   CALCIUM 9.3      K 5.3*   CO2 20*   *   BUN 57*   CREATININE 7.8*       Significant Diagnostics:  I have reviewed all pertinent imaging results/findings within the past 24 hours.

## 2024-02-20 NOTE — NURSING
Ping Shaffer. MD there is no blood consent yet in the chart, K 5.3, manual /66 around 0743, BP meds adjusted, Lokelma ordered and given. Blood Consent done and received in the chart. 1019: /65, Edmund. MD is  aware hydralazine and nifedipine given at 0054, and is ok to give hydralazine and nifedipine which was scheduled at 0900, meds given, TM.

## 2024-02-20 NOTE — ASSESSMENT & PLAN NOTE
S/P 2 KT 1992 and 2005  Home immunosuppresants; Tacro 3mg BID and Pred 5mg QD  Baseline Scr 2.8-3.2, CKD V  On lasix 80mg  Nifedipine 60mg BID  Would keep the current dose of immunosuppresants  Look for any infection, rising WBC, Fever.

## 2024-02-20 NOTE — CONSULTS
[facility] - Cardiology Stepdown  Kidney Transplant  Consult Note    Inpatient consult to Kidney/Pancreas Transplant Medicine  Consult performed by: Cece James MD  Consult ordered by: Mary Zimmer MD  Reason for consult: S/P KT, CKD V requiring dialysis            Subjective:     History of Present Illness:   68 year old male with ESRD s/p kidney transplant x2 (1992, 2005) on chronic immunosuppressive medications Prograf 3mg BID and Prednisone 5mg QD, CAD s/p PCI (2006), HTN, HLD, Chronic diastolic CHF, tachycardia-bradycardia syndrome with periods of pAF admitted to vascular surgery. Presented for outpatient fistulogram (recently had the fistula created on 12/18/23) with plans to start HD however had a period of unresponsiveness after the procedure. Pt was drowsy but arousable and able to answer questions after the episode. Wife reports he's had episodes similar to this for about 1.5 years however he has never been evaluated for them. Wife denies associated urinary or fecal incontinence, tongue biting, or whole body shaking associated with these episodes. Pt has an essential tremor and always has BL arm/hand shaking which is at it's baseline per Pt and wife. No personal or FHx seizure. Does not measure his BP or HR at home or during these episodes of unresponsiveness.     KTM consulted for immunosuppressive medication management and need for dialysis.    Immunosuppressants (From admission, onward)      Start     Stop Route Frequency Ordered    02/19/24 1800  tacrolimus capsule 3 mg         -- Oral 2 times daily 02/19/24 1344              Past Medical and Surgical History: Mr. Phelps has a past medical history of Atrial fibrillation, CAD (coronary artery disease) (2006), CHF (congestive heart failure) (2017), CKD (chronic kidney disease) stage 3, GFR 30-59 ml/min, COVID-19 (2/7/2023), Diverticulosis, Hyperlipidemia, Hypertension, Keloid cicatrix, Metabolic bone disease, Other emphysema (10/1/2019),  Pericarditis, S/P kidney transplant (1992), Thrombocytopenia, Thyroid disease, and Tobacco use (09/05/2014).  He has a past surgical history that includes Cholecystectomy; Cardioversion (04/30/15); Kidney transplant (2005); Parathyroidectomy; Colonoscopy (April 20, 2011); Colonoscopy (N/A, 3/13/2020); Colonoscopy (N/A, 2/4/2021); Colonoscopy (N/A, 3/3/2021); Coronary angioplasty with stent (9/13/2006); Treatment of cardiac arrhythmia (N/A, 3/28/2022); irrigation and debridement (Right, 8/30/2023); and AV fistula placement (Left, 12/18/2023).    Past Social and Family History: Mr. Phelps reports that he has been smoking cigarettes. He started smoking about 42 years ago. He has a 20.0 pack-year smoking history. He has never used smokeless tobacco. He reports that he does not drink alcohol and does not use drugs.His family history includes Heart disease in his father; Kidney disease in his brother and sister; Kidney failure in his brother and sister; No Known Problems in his brother, sister, sister, and sister; Thyroid disease in his mother.    Intake/Output - Last 3 Shifts         02/18 0700  02/19 0659 02/19 0700  02/20 0659 02/20 0700  02/21 0659    P.O.   240    Blood   288    Total Intake(mL/kg)   528 (6.1)    Urine (mL/kg/hr)  150 600 (0.9)    Total Output  150 600    Net  -150 -72                    Review of Systems   Constitutional:  Positive for fatigue.   HENT: Negative.     Eyes: Negative.    Respiratory:  Positive for shortness of breath.    Cardiovascular: Negative.    Gastrointestinal: Negative.    Endocrine: Negative.    Genitourinary: Negative.    Musculoskeletal: Negative.    Neurological:  Positive for syncope.   Psychiatric/Behavioral: Negative.       Objective:     Vital Signs (Most Recent):  Temp: 98.2 °F (36.8 °C) (02/20/24 1342)  Pulse: (!) 55 (02/20/24 1342)  Resp: 18 (02/20/24 1342)  BP: 138/60 (02/20/24 1342)  SpO2: 98 % (02/20/24 1342) Vital Signs (24h Range):  Temp:  [97.3 °F (36.3 °C)-98.7  "°F (37.1 °C)] 98.2 °F (36.8 °C)  Pulse:  [52-64] 55  Resp:  [16-18] 18  SpO2:  [95 %-98 %] 98 %  BP: (132-185)/(60-86) 138/60     Weight: 86.2 kg (190 lb)  Height: 6' 1" (185.4 cm)  Body mass index is 25.07 kg/m².     Physical Exam  Constitutional:       Appearance: Normal appearance.   Cardiovascular:      Rate and Rhythm: Bradycardia present.   Pulmonary:      Effort: Pulmonary effort is normal.      Breath sounds: Rhonchi and rales present.   Abdominal:      Palpations: Abdomen is soft.   Musculoskeletal:         General: No swelling.      Comments: AV graft in LUE   Skin:     General: Skin is warm.   Neurological:      General: No focal deficit present.      Mental Status: He is alert and oriented to person, place, and time.   Psychiatric:         Mood and Affect: Mood normal.         Behavior: Behavior normal.          Significant Labs:  CBC:   Recent Labs   Lab 02/19/24  1125 02/20/24  0307   WBC 6.67 4.02   RBC 2.97* 2.66*   HGB 7.7* 6.7*   HCT 23.9* 21.2*   * 130*   MCV 81* 80*   MCH 25.9* 25.2*   MCHC 32.2 31.6*     BMP:   Recent Labs   Lab 02/19/24  1125 02/20/24  0307    113*    142   K 4.0 5.3*   * 113*   CO2 18* 20*   BUN 52* 57*   CREATININE 7.4* 7.8*   CALCIUM 9.3 9.3     Assessment/Plan:     Cardiac/Vascular  CAD (coronary artery disease)  CAD s/p CABG 2006  Per primary      Renal/  CKD (chronic kidney disease) stage 5, GFR less than 15 ml/min  S/P KT  CKD V with baseline Scr 2.8-3.2, eGFR < 15, high risk of requiring dialysis  CXR; pulmonary infiltrates with edema picture, having SOB on exertion, BNP 2100  LUE AVG functional according to Vasular surgeon, good bruit and palpable thrill on examination  Patient refused to give consent for dialysis, spent almost 15min tried to convince him but still refused and saying ;  ''its my body, my decision, I am not allowing anyone to touch my graft until it heals, I will think over dialysis after 1 week or will talk to my own " Nephrologist''  Will strictly follow K levels as levels on high trends    H/O kidney transplant  S/P 2 KT 1992 and 2005  Home immunosuppresants; Tacro 3mg BID and Pred 5mg QD  Baseline Scr 2.8-3.2, CKD V  On lasix 80mg  Nifedipine 60mg BID  Would keep the current dose of immunosuppresants  Look for any infection, rising WBC, Fever.      Hyperkalemia;  Lasix 80 daily, currently has residual urine output  Lokelma 10g BID  Strictly following repeat K labs      Cece James MD  Kidney Transplant  [facility] - Cardiology Stepdown

## 2024-02-20 NOTE — PLAN OF CARE
Inpatient Upgrade Note    Ibrahima SUKHI Lenin has warranted treatment spanning two or more midnights of hospital level care for the management of  Chronic Kidney disease, with worsening renal function . He continues to require daily labs, monitoring of vital signs, medication adjustments, and monitoring of hyperkalemia and anemia.  Receiving transfusion of PRBC's and IV Diuresis today . His condition is also complicated by the following comorbidities: Coronary Artery Disease, Hypertension, and CHF, and CKD, s/p kidney transplant x 2 .

## 2024-02-20 NOTE — ASSESSMENT & PLAN NOTE
Ibrahima Phelps is a with history of previously transplanted kidneys x2, now with CKD 5 with plan to transition to HD. He underwent previous left upper extremity fistula creation, and had PTA of LAVF with conscious sedation on 2/20.  During the procedure, the patient had an episode of unresponsiveness that resolved quickly.  Patient admitted to medicine for observation and further eval of possible protamine reaction v seizures v symptomatic bradycardia.     -L AVF puncture site is well-appearing without drainage or erythema, non-tender with good thrill post- procedure  - Appreciate work-up with medicine as primary   - Eval per EP with recommendations to monitor patient with telemetry and see Dr. Agata vieyra for possible pacemaker placement  - Vascular surgery will continue to follow peripherally at this time, please reach out with any changes/ questions   - Clinic notified patient will have follow up in one week with HD ultrasound and suture removal

## 2024-02-20 NOTE — PLAN OF CARE
[facility] - Cardiology Stepdown  Initial Discharge Assessment       Primary Care Provider: Anatoliy Colindres MD    Admission Diagnosis: Arteriovenous fistula stenosis, subsequent encounter [T80.837D]  CKD (chronic kidney disease) stage 5, GFR less than 15 ml/min [N18.5]    Admission Date: 2/19/2024  Expected Discharge Date: 2/21/2024    Transition of Care Barriers: None    Payor: HU[employer] MANAGED MEDICARE / Plan: HU[employer] MEDICARE HMO / Product Type: Capitation /     Extended Emergency Contact Information  Primary Emergency Contact: Shea Phelps  Address: [address] [place] [address] [facility]  Home Phone: [phone]  Mobile Phone: [phone]  Relation: Spouse  Preferred language: English  Secondary Emergency Contact: Mary Becerra  Address: [address] [place] [address] [facility]  Home Phone: [phone]  Mobile Phone: [phone]  Relation: Daughter    Discharge Plan A: Home with family  Discharge Plan B: Home      [id number] - [place], [place] - [address]  [address]  [facility] [place] [address]  Phone: [phone] Fax: [phone]      Initial Assessment (most recent)       Adult Discharge Assessment - 02/20/24 1238          Discharge Assessment    Assessment Type Discharge Planning Assessment     Confirmed/corrected address, phone number and insurance Yes     Confirmed Demographics Correct on Facesheet     Source of Information patient;family;health record     Communicated RAMU with patient/caregiver Yes     Reason For Admission Syncope     People in Home spouse     Facility Arrived From: Cath lab     Do you expect to return to your current living situation? Yes     Do you have help at home or someone to help you manage your care at home? Yes     Who are your caregiver(s) and their phone number(s)? Shea Phelps (wife) [phone]     Prior to hospitilization cognitive status: Alert/Oriented     Current  cognitive status: Alert/Oriented     Walking or Climbing Stairs Difficulty no     Dressing/Bathing Difficulty no     Home Layout Able to live on 1st floor     Equipment Currently Used at Home none     Readmission within 30 days? No     Patient currently being followed by outpatient case management? No     Do you currently have service(s) that help you manage your care at home? No     Do you take prescription medications? Yes     Do you have prescription coverage? Yes     Do you have any problems affording any of your prescribed medications? No     Is the patient taking medications as prescribed? yes     Who is going to help you get home at discharge? Shea Phelps (wife) [phone]     How do you get to doctors appointments? car, drives self;family or friend will provide     Are you on dialysis? No     Do you take coumadin? No     Discharge Plan A Home with family     Discharge Plan B Home     DME Needed Upon Discharge  none     Discharge Plan discussed with: Patient;Spouse/sig other     Name(s) and Number(s) Shea Phelps (wife) [phone]     Transition of Care Barriers None                 SW met with pt and wife at bedside to discuss discharge planning.  Pt lives with his wife in a one-story house and is independent with ambulation and ADLs.  Pt will have transportation and assistance from his wife at discharge.  Discharge Plan A and Plan B have been determined by review of patient's clinical status, future medical and therapeutic needs, and coverage/benefits for post-acute care in coordination with multidisciplinary team members.  UBALDO name and ext on whiteboard; discharge planning booklet provided.  Will continue to follow.      Kerri Bray LMSW  [facility]  [id number]

## 2024-02-20 NOTE — HPI
68 year old male with ESRD s/p kidney transplant x2 (1992, 2005) on chronic immunosuppressive medications Prograf 3mg BID and Prednisone 5mg QD, CAD s/p PCI (2006), HTN, HLD, Chronic diastolic CHF, tachycardia-bradycardia syndrome with periods of pAF admitted to vascular surgery. Presented for outpatient fistulogram (recently had the fistula created on 12/18/23) with plans to start HD however had a period of unresponsiveness after the procedure. Pt was drowsy but arousable and able to answer questions after the episode. Wife reports he's had episodes similar to this for about 1.5 years however he has never been evaluated for them. Wife denies associated urinary or fecal incontinence, tongue biting, or whole body shaking associated with these episodes. Pt has an essential tremor and always has BL arm/hand shaking which is at it's baseline per Pt and wife. No personal or FHx seizure. Does not measure his BP or HR at home or during these episodes of unresponsiveness.     KTM consulted for immunosuppressive medication management and need for dialysis.

## 2024-02-20 NOTE — PLAN OF CARE
Problem: Adult Inpatient Plan of Care  Goal: Plan of Care Review  2/20/2024 0519 by Aurelia Means RN  Outcome: Ongoing, Progressing  2/20/2024 0519 by Aurelia Means RN  Outcome: Ongoing, Progressing  Goal: Patient-Specific Goal (Individualized)  2/20/2024 0519 by Aurelia Means RN  Outcome: Ongoing, Progressing  2/20/2024 0519 by Aurelia Means RN  Outcome: Ongoing, Progressing  Goal: Absence of Hospital-Acquired Illness or Injury  2/20/2024 0519 by Aurelia Means RN  Outcome: Ongoing, Progressing  2/20/2024 0519 by Aurelia Means RN  Outcome: Ongoing, Progressing  Goal: Optimal Comfort and Wellbeing  2/20/2024 0519 by Aurelia Means RN  Outcome: Ongoing, Progressing  2/20/2024 0519 by Aurelia Means RN  Outcome: Ongoing, Progressing  Goal: Readiness for Transition of Care  2/20/2024 0519 by Aurelia Means RN  Outcome: Ongoing, Progressing  2/20/2024 0519 by Aurelia Means RN  Outcome: Ongoing, Progressing

## 2024-02-20 NOTE — SUBJECTIVE & OBJECTIVE
Subjective:     History of Present Illness:   68 year old male with ESRD s/p kidney transplant x2 (1992, 2005) on chronic immunosuppressive medications Prograf 3mg BID and Prednisone 5mg QD, CAD s/p PCI (2006), HTN, HLD, Chronic diastolic CHF, tachycardia-bradycardia syndrome with periods of pAF admitted to vascular surgery. Presented for outpatient fistulogram (recently had the fistula created on 12/18/23) with plans to start HD however had a period of unresponsiveness after the procedure. Pt was drowsy but arousable and able to answer questions after the episode. Wife reports he's had episodes similar to this for about 1.5 years however he has never been evaluated for them. Wife denies associated urinary or fecal incontinence, tongue biting, or whole body shaking associated with these episodes. Pt has an essential tremor and always has BL arm/hand shaking which is at it's baseline per Pt and wife. No personal or FHx seizure. Does not measure his BP or HR at home or during these episodes of unresponsiveness.     KTM consulted for immunosuppressive medication management and need for dialysis.    Immunosuppressants (From admission, onward)      Start     Stop Route Frequency Ordered    02/19/24 1800  tacrolimus capsule 3 mg         -- Oral 2 times daily 02/19/24 1344              Past Medical and Surgical History: Mr. Phelps has a past medical history of Atrial fibrillation, CAD (coronary artery disease) (2006), CHF (congestive heart failure) (2017), CKD (chronic kidney disease) stage 3, GFR 30-59 ml/min, COVID-19 (2/7/2023), Diverticulosis, Hyperlipidemia, Hypertension, Keloid cicatrix, Metabolic bone disease, Other emphysema (10/1/2019), Pericarditis, S/P kidney transplant (1992), Thrombocytopenia, Thyroid disease, and Tobacco use (09/05/2014).  He has a past surgical history that includes Cholecystectomy; Cardioversion (04/30/15); Kidney transplant (2005); Parathyroidectomy; Colonoscopy (April 20, 2011);  "Colonoscopy (N/A, 3/13/2020); Colonoscopy (N/A, 2/4/2021); Colonoscopy (N/A, 3/3/2021); Coronary angioplasty with stent (9/13/2006); Treatment of cardiac arrhythmia (N/A, 3/28/2022); irrigation and debridement (Right, 8/30/2023); and AV fistula placement (Left, 12/18/2023).    Past Social and Family History: Mr. Phelps reports that he has been smoking cigarettes. He started smoking about 42 years ago. He has a 20.0 pack-year smoking history. He has never used smokeless tobacco. He reports that he does not drink alcohol and does not use drugs.His family history includes Heart disease in his father; Kidney disease in his brother and sister; Kidney failure in his brother and sister; No Known Problems in his brother, sister, sister, and sister; Thyroid disease in his mother.    Intake/Output - Last 3 Shifts         02/18 0700  02/19 0659 02/19 0700  02/20 0659 02/20 0700  02/21 0659    P.O.   240    Blood   288    Total Intake(mL/kg)   528 (6.1)    Urine (mL/kg/hr)  150 600 (0.9)    Total Output  150 600    Net  -150 -72                    Review of Systems   Constitutional:  Positive for fatigue.   HENT: Negative.     Eyes: Negative.    Respiratory:  Positive for shortness of breath.    Cardiovascular: Negative.    Gastrointestinal: Negative.    Endocrine: Negative.    Genitourinary: Negative.    Musculoskeletal: Negative.    Neurological:  Positive for syncope.   Psychiatric/Behavioral: Negative.       Objective:     Vital Signs (Most Recent):  Temp: 98.2 °F (36.8 °C) (02/20/24 1342)  Pulse: (!) 55 (02/20/24 1342)  Resp: 18 (02/20/24 1342)  BP: 138/60 (02/20/24 1342)  SpO2: 98 % (02/20/24 1342) Vital Signs (24h Range):  Temp:  [97.3 °F (36.3 °C)-98.7 °F (37.1 °C)] 98.2 °F (36.8 °C)  Pulse:  [52-64] 55  Resp:  [16-18] 18  SpO2:  [95 %-98 %] 98 %  BP: (132-185)/(60-86) 138/60     Weight: 86.2 kg (190 lb)  Height: 6' 1" (185.4 cm)  Body mass index is 25.07 kg/m².     Physical Exam  Constitutional:       Appearance: Normal " appearance.   Cardiovascular:      Rate and Rhythm: Bradycardia present.   Pulmonary:      Effort: Pulmonary effort is normal.      Breath sounds: Rhonchi and rales present.   Abdominal:      Palpations: Abdomen is soft.   Musculoskeletal:         General: No swelling.      Comments: AV graft in LUE   Skin:     General: Skin is warm.   Neurological:      General: No focal deficit present.      Mental Status: He is alert and oriented to person, place, and time.   Psychiatric:         Mood and Affect: Mood normal.         Behavior: Behavior normal.          Significant Labs:  CBC:   Recent Labs   Lab 02/19/24  1125 02/20/24  0307   WBC 6.67 4.02   RBC 2.97* 2.66*   HGB 7.7* 6.7*   HCT 23.9* 21.2*   * 130*   MCV 81* 80*   MCH 25.9* 25.2*   MCHC 32.2 31.6*     BMP:   Recent Labs   Lab 02/19/24  1125 02/20/24  0307    113*    142   K 4.0 5.3*   * 113*   CO2 18* 20*   BUN 52* 57*   CREATININE 7.4* 7.8*   CALCIUM 9.3 9.3

## 2024-02-20 NOTE — ASSESSMENT & PLAN NOTE
S/P KT  CKD V with baseline Scr 2.8-3.2, eGFR < 15, high risk of requiring dialysis  CXR; pulmonary infiltrates with edema picture, having SOB on exertion, BNP 2100  LUE AVG functional according to Vasular surgeon, good bruit and palpable thrill on examination  Patient refused to give consent for dialysis, spent almost 15min tried to convince him but still refused and saying ;  ''its my body, my decision, I am not allowing anyone to touch my graft until it heals, I will think over dialysis after 1 week or will talk to my own Nephrologist''  Will strictly follow K levels as levels on high trends

## 2024-02-21 VITALS
RESPIRATION RATE: 18 BRPM | TEMPERATURE: 98 F | DIASTOLIC BLOOD PRESSURE: 62 MMHG | OXYGEN SATURATION: 98 % | WEIGHT: 194.69 LBS | HEART RATE: 68 BPM | HEIGHT: 73 IN | BODY MASS INDEX: 25.8 KG/M2 | SYSTOLIC BLOOD PRESSURE: 138 MMHG

## 2024-02-21 LAB
ANION GAP SERPL CALC-SCNC: 10 MMOL/L (ref 8–16)
BASOPHILS # BLD AUTO: 0.02 K/UL (ref 0–0.2)
BASOPHILS NFR BLD: 0.2 % (ref 0–1.9)
BUN SERPL-MCNC: 67 MG/DL (ref 8–23)
CALCIUM SERPL-MCNC: 8.8 MG/DL (ref 8.7–10.5)
CHLORIDE SERPL-SCNC: 112 MMOL/L (ref 95–110)
CO2 SERPL-SCNC: 21 MMOL/L (ref 23–29)
CREAT SERPL-MCNC: 8.2 MG/DL (ref 0.5–1.4)
DIFFERENTIAL METHOD BLD: ABNORMAL
EOSINOPHIL # BLD AUTO: 0.1 K/UL (ref 0–0.5)
EOSINOPHIL NFR BLD: 1.4 % (ref 0–8)
ERYTHROCYTE [DISTWIDTH] IN BLOOD BY AUTOMATED COUNT: 17.8 % (ref 11.5–14.5)
EST. GFR  (NO RACE VARIABLE): 6.5 ML/MIN/1.73 M^2
GLUCOSE SERPL-MCNC: 92 MG/DL (ref 70–110)
HCT VFR BLD AUTO: 22.4 % (ref 40–54)
HGB BLD-MCNC: 7.1 G/DL (ref 14–18)
HGB BLD-MCNC: 8 G/DL (ref 14–18)
IMM GRANULOCYTES # BLD AUTO: 0.03 K/UL (ref 0–0.04)
IMM GRANULOCYTES NFR BLD AUTO: 0.4 % (ref 0–0.5)
LYMPHOCYTES # BLD AUTO: 1.4 K/UL (ref 1–4.8)
LYMPHOCYTES NFR BLD: 16.8 % (ref 18–48)
MAGNESIUM SERPL-MCNC: 2 MG/DL (ref 1.6–2.6)
MCH RBC QN AUTO: 25.6 PG (ref 27–31)
MCHC RBC AUTO-ENTMCNC: 31.7 G/DL (ref 32–36)
MCV RBC AUTO: 81 FL (ref 82–98)
MONOCYTES # BLD AUTO: 0.6 K/UL (ref 0.3–1)
MONOCYTES NFR BLD: 8 % (ref 4–15)
NEUTROPHILS # BLD AUTO: 5.9 K/UL (ref 1.8–7.7)
NEUTROPHILS NFR BLD: 73.2 % (ref 38–73)
NRBC BLD-RTO: 0 /100 WBC
PHOSPHATE SERPL-MCNC: 5 MG/DL (ref 2.7–4.5)
PLATELET # BLD AUTO: 121 K/UL (ref 150–450)
PMV BLD AUTO: ABNORMAL FL (ref 9.2–12.9)
POCT GLUCOSE: 113 MG/DL (ref 70–110)
POCT GLUCOSE: 89 MG/DL (ref 70–110)
POTASSIUM SERPL-SCNC: 4.4 MMOL/L (ref 3.5–5.1)
RBC # BLD AUTO: 2.77 M/UL (ref 4.6–6.2)
SODIUM SERPL-SCNC: 143 MMOL/L (ref 136–145)
TACROLIMUS BLD-MCNC: 3.5 NG/ML (ref 5–15)
WBC # BLD AUTO: 8.02 K/UL (ref 3.9–12.7)

## 2024-02-21 PROCEDURE — 36415 COLL VENOUS BLD VENIPUNCTURE: CPT | Performed by: STUDENT IN AN ORGANIZED HEALTH CARE EDUCATION/TRAINING PROGRAM

## 2024-02-21 PROCEDURE — 85025 COMPLETE CBC W/AUTO DIFF WBC: CPT | Performed by: STUDENT IN AN ORGANIZED HEALTH CARE EDUCATION/TRAINING PROGRAM

## 2024-02-21 PROCEDURE — 99233 SBSQ HOSP IP/OBS HIGH 50: CPT | Mod: GC,,, | Performed by: INTERNAL MEDICINE

## 2024-02-21 PROCEDURE — 83735 ASSAY OF MAGNESIUM: CPT | Performed by: STUDENT IN AN ORGANIZED HEALTH CARE EDUCATION/TRAINING PROGRAM

## 2024-02-21 PROCEDURE — 63600175 PHARM REV CODE 636 W HCPCS: Performed by: STUDENT IN AN ORGANIZED HEALTH CARE EDUCATION/TRAINING PROGRAM

## 2024-02-21 PROCEDURE — 25000003 PHARM REV CODE 250: Performed by: STUDENT IN AN ORGANIZED HEALTH CARE EDUCATION/TRAINING PROGRAM

## 2024-02-21 PROCEDURE — 94640 AIRWAY INHALATION TREATMENT: CPT

## 2024-02-21 PROCEDURE — 80197 ASSAY OF TACROLIMUS: CPT

## 2024-02-21 PROCEDURE — 25000003 PHARM REV CODE 250

## 2024-02-21 PROCEDURE — 36415 COLL VENOUS BLD VENIPUNCTURE: CPT | Mod: XB | Performed by: HOSPITALIST

## 2024-02-21 PROCEDURE — 85018 HEMOGLOBIN: CPT | Performed by: HOSPITALIST

## 2024-02-21 PROCEDURE — 94761 N-INVAS EAR/PLS OXIMETRY MLT: CPT

## 2024-02-21 PROCEDURE — 84100 ASSAY OF PHOSPHORUS: CPT | Performed by: STUDENT IN AN ORGANIZED HEALTH CARE EDUCATION/TRAINING PROGRAM

## 2024-02-21 PROCEDURE — 80048 BASIC METABOLIC PNL TOTAL CA: CPT | Performed by: STUDENT IN AN ORGANIZED HEALTH CARE EDUCATION/TRAINING PROGRAM

## 2024-02-21 PROCEDURE — 25000003 PHARM REV CODE 250: Performed by: HOSPITALIST

## 2024-02-21 RX ORDER — SEVELAMER CARBONATE 800 MG/1
800 TABLET, FILM COATED ORAL
Qty: 90 TABLET | Refills: 11 | Status: SHIPPED | OUTPATIENT
Start: 2024-02-21 | End: 2025-02-20

## 2024-02-21 RX ORDER — SODIUM POLYSTYRENE SULFONATE 4.1 MEQ/G
15 POWDER, FOR SUSPENSION ORAL; RECTAL DAILY
Qty: 454 G | Refills: 2 | Status: ON HOLD | OUTPATIENT
Start: 2024-02-21 | End: 2024-03-02 | Stop reason: HOSPADM

## 2024-02-21 RX ORDER — METOPROLOL SUCCINATE 25 MG/1
12.5 TABLET, EXTENDED RELEASE ORAL DAILY
Qty: 15 TABLET | Refills: 11
Start: 2024-02-22 | End: 2024-04-29 | Stop reason: SDUPTHER

## 2024-02-21 RX ORDER — FUROSEMIDE 80 MG/1
80 TABLET ORAL 2 TIMES DAILY
Qty: 60 TABLET | Refills: 11 | Status: ON HOLD | OUTPATIENT
Start: 2024-02-21 | End: 2024-03-02 | Stop reason: HOSPADM

## 2024-02-21 RX ADMIN — THERA TABS 1 TABLET: TAB at 09:02

## 2024-02-21 RX ADMIN — HYDRALAZINE HYDROCHLORIDE 50 MG: 50 TABLET ORAL at 09:02

## 2024-02-21 RX ADMIN — CLOPIDOGREL BISULFATE 75 MG: 75 TABLET ORAL at 09:02

## 2024-02-21 RX ADMIN — FLUTICASONE FUROATE AND VILANTEROL TRIFENATATE 1 PUFF: 100; 25 POWDER RESPIRATORY (INHALATION) at 09:02

## 2024-02-21 RX ADMIN — PANTOPRAZOLE SODIUM 40 MG: 40 TABLET, DELAYED RELEASE ORAL at 09:02

## 2024-02-21 RX ADMIN — NIFEDIPINE 60 MG: 60 TABLET, FILM COATED, EXTENDED RELEASE ORAL at 09:02

## 2024-02-21 RX ADMIN — ISOSORBIDE MONONITRATE 120 MG: 60 TABLET, EXTENDED RELEASE ORAL at 09:02

## 2024-02-21 RX ADMIN — AMIODARONE HYDROCHLORIDE 200 MG: 200 TABLET ORAL at 09:02

## 2024-02-21 RX ADMIN — TACROLIMUS 3 MG: 1 CAPSULE ORAL at 08:02

## 2024-02-21 RX ADMIN — PARICALCITOL 1 MCG: 1 CAPSULE, LIQUID FILLED ORAL at 09:02

## 2024-02-21 RX ADMIN — METOPROLOL SUCCINATE 12.5 MG: 25 TABLET, EXTENDED RELEASE ORAL at 09:02

## 2024-02-21 RX ADMIN — PREDNISONE 5 MG: 2.5 TABLET ORAL at 09:02

## 2024-02-21 RX ADMIN — ATORVASTATIN CALCIUM 80 MG: 20 TABLET, FILM COATED ORAL at 09:02

## 2024-02-21 RX ADMIN — APIXABAN 5 MG: 5 TABLET, FILM COATED ORAL at 09:02

## 2024-02-21 RX ADMIN — CALCIUM ACETATE 667 MG: 667 CAPSULE ORAL at 09:02

## 2024-02-21 RX ADMIN — SODIUM ZIRCONIUM CYCLOSILICATE 10 G: 5 POWDER, FOR SUSPENSION ORAL at 10:02

## 2024-02-21 RX ADMIN — TIOTROPIUM BROMIDE INHALATION SPRAY 2 PUFF: 1.56 SPRAY, METERED RESPIRATORY (INHALATION) at 09:02

## 2024-02-21 RX ADMIN — CALCIUM ACETATE 667 MG: 667 CAPSULE ORAL at 12:02

## 2024-02-21 NOTE — HOSPITAL COURSE
History of Present Illness:   68 year old male with ESRD s/p kidney transplant x2 (1992, 2005) on chronic immunosuppressive medication, CAD s/p PCI (2006), HTN, HLD, Chronic diastolic CHF, tachycardia-bradycardia syndrome with periods of pAF admitted to vascular surgery. Presented for outpatient fistulogram (recently had the fistula created on 12/18/23) with plans to start HD however had a period of unresponsiveness after the procedure. Received protamine for heparin reversal and 30 mins after the protamine was given, he became unresponsive with unfocused eyes; also noted to have left arm shaking. HR was low in the 40s but O2 sats were normal; BP was not obtained during the episode but was obtained about 2 minutes later and it was norml. Pt was drowsy but arousable and able to answer questions after the episode. Pt seen at bedside on third floor; wife at bedside. Wife reports he's had episodes similar to this for about 1.5 years however he has never been evaluated for them. Wife denies associated urinary or fecal incontinence, tongue biting, or whole body shaking associated with these episodes. Pt has an essential tremor and always has BL arm/hand shaking which is at it's baseline per Pt and wife. No personal or FHx seizure. Does not measure his BP or HR at home or during these episodes of unresponsiveness. Follows with Dr. Coleman for his pAF; also has tachy-loan syndrome but does not have a PPM; Pt and wife states PPM has been discussed with them previously but there were no plans to insert one as he was previously asymptomatic. Reports feeling well prior to his fistulogram; denies fevers, chills, cough, nausea, vomiting, diarrhea, or dysuria.      He was transferred to medicine this AM. Denies any complaints. Does report SOB & orthopnea the past several months that has been stable. Denies weight gain or edema. Denies syncopal episodes other than the event after procedure yesterday. Denies palpitations.  Refusing dialysis. Wants to go home.     HOSPITAL COURSE  Syncope after vascular procedure. No syncopal episodes or events on telemetry were noted after admission. IV Lasix was given noting BNP > 2000 and overload on exam. No O2 requirements. K 5.3,  resolved after Kayexalate. KTM was consulted and felt that he needed dialysis but patient declines, prefers to f/u with his outpt nephrologist. 1 u prbc's given for Hb 6.7, H/H responded appropriately. F/U with PCP scheduled next week. He will contact his nephrologist for an earlier appt. Repeat RFP in 1 wk.

## 2024-02-21 NOTE — SUBJECTIVE & OBJECTIVE
Subjective:   History of Present Illness:  68 year old male with ESRD s/p kidney transplant x2 (1992, 2005) on chronic immunosuppressive medications Prograf 3mg BID and Prednisone 5mg QD, CAD s/p PCI (2006), HTN, HLD, Chronic diastolic CHF, tachycardia-bradycardia syndrome with periods of pAF admitted to vascular surgery. Presented for outpatient fistulogram (recently had the fistula created on 12/18/23) with plans to start HD however had a period of unresponsiveness after the procedure. Pt was drowsy but arousable and able to answer questions after the episode. Wife reports he's had episodes similar to this for about 1.5 years however he has never been evaluated for them. Wife denies associated urinary or fecal incontinence, tongue biting, or whole body shaking associated with these episodes. Pt has an essential tremor and always has BL arm/hand shaking which is at it's baseline per Pt and wife. No personal or FHx seizure. Does not measure his BP or HR at home or during these episodes of unresponsiveness.     KTM consulted for immunosuppressive medication management and need for dialysis.    Mr. Phelps is a 68 y.o. year old male who is status post Kidney Transplant Referral - 10/24/2023 - Declined.    His maintenance immunosuppression consists of:   Immunosuppressants (From admission, onward)      Start     Stop Route Frequency Ordered    02/19/24 1800  tacrolimus capsule 3 mg         -- Oral 2 times daily 02/19/24 1344            Interval History:  No acute events overnight, K normalized 4.4, on room air, oriented and following commands.  Me & Dr Webb had a discussion with him regarding dialysis but he still refused and wants to follow up with his Nephrologist as outpatient and proceed after that. Told him if he feels any SOB or worsening symptoms, he should visit ED.  Recommended Lasix 80mg BID, Lokelma for hyperkalemia and Sevelamer for high phosphorous. He was able to understand everything and agreed to  follow up with his Nehrologist.    Past Medical, Surgical, Family, and Social History:   Unchanged from H&P.    Scheduled Meds:   amiodarone  200 mg Oral Daily    apixaban  5 mg Oral BID    atorvastatin  80 mg Oral Daily    calcium acetate(phosphat bind)  667 mg Oral TID WM    clopidogreL  75 mg Oral Daily    fluticasone furoate-vilanteroL  1 puff Inhalation Daily    hydrALAZINE  50 mg Oral TID    isosorbide mononitrate  120 mg Oral Daily    metoprolol succinate  12.5 mg Oral Daily    multivitamin  1 tablet Oral Daily    NIFEdipine  60 mg Oral BID    pantoprazole  40 mg Oral Daily    paricalcitoL  1 mcg Oral Every Mon, Wed, Fri    predniSONE  5 mg Oral Daily    sodium zirconium cyclosilicate  10 g Oral Daily    tacrolimus  3 mg Oral BID    tiotropium bromide  2 puff Inhalation Daily     Continuous Infusions:  PRN Meds:0.9%  NaCl infusion (for blood administration), albuterol, albuterol-ipratropium, dextrose 10%, dextrose 10%, gabapentin, glucagon (human recombinant), glucose, glucose, insulin aspart U-100, nitroGLYCERIN, sodium chloride 0.9%, traZODone    Intake/Output - Last 3 Shifts         02/19 0700  02/20 0659 02/20 0700 02/21 0659 02/21 0700  02/22 0659    P.O.  840 240    Blood  288     Total Intake(mL/kg)  1128 (12.5) 240 (2.7)    Urine (mL/kg/hr) 150 1650 (0.8) 900 (2.1)    Stool   0    Total Output 150 1650 900    Net -150 -522 -660           Stool Occurrence   1 x             Review of Systems   Constitutional: Negative.    HENT: Negative.     Eyes: Negative.    Respiratory: Negative.     Cardiovascular: Negative.    Gastrointestinal: Negative.    Endocrine: Negative.    Genitourinary: Negative.    Neurological: Negative.    Hematological: Negative.    Psychiatric/Behavioral: Negative.        Objective:     Vital Signs (Most Recent):  Temp: 98.2 °F (36.8 °C) (02/21/24 0750)  Pulse: (!) 55 (02/21/24 1137)  Resp: 18 (02/21/24 0945)  BP: 138/65 (02/21/24 0750)  SpO2: 96 % (02/21/24 0945) Vital Signs (24h  "Range):  Temp:  [97.3 °F (36.3 °C)-98.2 °F (36.8 °C)] 98.2 °F (36.8 °C)  Pulse:  [49-64] 55  Resp:  [18] 18  SpO2:  [96 %-98 %] 96 %  BP: (128-165)/(58-72) 138/65     Weight: 88.3 kg (194 lb 10.7 oz)  Height: 6' 1" (185.4 cm)  Body mass index is 25.68 kg/m².     Physical Exam  Constitutional:       Appearance: Normal appearance.   Pulmonary:      Breath sounds: Rhonchi and rales present.   Musculoskeletal:      Cervical back: Normal range of motion.      Right lower leg: Edema present.      Left lower leg: Edema present.   Skin:     General: Skin is warm.   Neurological:      General: No focal deficit present.      Mental Status: He is alert and oriented to person, place, and time.   Psychiatric:         Mood and Affect: Mood normal.         Behavior: Behavior normal.          Laboratory:  CBC:   Recent Labs   Lab 02/19/24  1125 02/20/24 0307 02/21/24  0306 02/21/24  0923   WBC 6.67 4.02 8.02  --    RBC 2.97* 2.66* 2.77*  --    HGB 7.7* 6.7* 7.1* 8.0*   HCT 23.9* 21.2* 22.4*  --    * 130* 121*  --    MCV 81* 80* 81*  --    MCH 25.9* 25.2* 25.6*  --    MCHC 32.2 31.6* 31.7*  --      BMP:   Recent Labs   Lab 02/19/24  1125 02/20/24  0307 02/21/24  0306    113* 92    142 143   K 4.0 5.3* 4.4   * 113* 112*   CO2 18* 20* 21*   BUN 52* 57* 67*   CREATININE 7.4* 7.8* 8.2*   CALCIUM 9.3 9.3 8.8       "

## 2024-02-21 NOTE — PLAN OF CARE
Problem: Adult Inpatient Plan of Care  Goal: Plan of Care Review  Outcome: Ongoing, Progressing  Goal: Patient-Specific Goal (Individualized)  Outcome: Ongoing, Progressing  Goal: Absence of Hospital-Acquired Illness or Injury  Outcome: Ongoing, Progressing  Goal: Optimal Comfort and Wellbeing  Outcome: Ongoing, Progressing  Goal: Readiness for Transition of Care  Outcome: Ongoing, Progressing     Problem: Hemodynamic Instability (Hemodialysis)  Goal: Effective Tissue Perfusion  Outcome: Ongoing, Progressing

## 2024-02-21 NOTE — PLAN OF CARE
Pt and spouse given discharge instruction, medication reviewed with the pt, follow up appts reviewed. All questions and concerns addressed. Pt verbalized understanding. IV, telemetry removed. Discharge paperwork given to patient. Pt in room waiting for transport.

## 2024-02-21 NOTE — PLAN OF CARE
Spouse at bedside. VSS. BG monitored. 1 unit blood given  during the day. Pt educated on fall risk and remained free from falls/trauma/injury. Denies chest pain, SOB, palpitations, dizziness, pain, or discomfort. Plan of care reviewed with pt, all questions answered. Bed locked in lowest position, call bell within reach, no acute distress noted, will continue to monitor.

## 2024-02-21 NOTE — CARE UPDATE
I have reviewed the chart of Ibrahima SUKHI Phelps who is hospitalized for the following:    Active Hospital Problems    Diagnosis    *Syncope    Symptomatic bradycardia    GERD (gastroesophageal reflux disease)    ESRD (end stage renal disease)    CKD (chronic kidney disease) stage 5, GFR less than 15 ml/min    Benign essential tremor    Anemia in stage 5 chronic kidney disease, not on chronic dialysis    Paroxysmal atrial fibrillation    (HFpEF) heart failure with preserved ejection fraction    H/O kidney transplant    Mixed hyperlipidemia    Primary hypertension    CAD (coronary artery disease)    Thrombocytopenia     Lianne Milian NP  Unit Based [facility]

## 2024-02-21 NOTE — DISCHARGE SUMMARY
[facility] - Cardiology [facility] Medicine  Discharge Summary      Patient Name: Ibrahima Phelps  MRN: [id number]  SHAVONNE: [id number]  Patient Class: IP- Inpatient  Admission Date: 2/19/2024  Hospital Length of Stay: 2 days  Discharge Date and Time:  02/21/2024 11:31 AM  Attending Physician: Nadira Shaffer MD   Discharging Provider: Nadira Shaffer MD  Primary Care Provider: Anatoliy Colindres MD  [facility] Medicine Team: [facility] MED C Nadira Shaffer MD  Primary Care Team: [facility] MED C    HPI:   68 year old male with ESRD s/p kidney transplant x2 (1992, 2005) on chronic immunosuppressive medication, CAD s/p PCI (2006), HTN, HLD, Chronic diastolic CHF, tachycardia-bradycardia syndrome with periods of pAF admitted to vascular surgery. Presented for outpatient fistulogram (recently had the fistula created on 12/18/23) with plans to start HD however had a period of unresponsiveness after the procedure. Received protamine for heparin reversal and 30 mins after the protamine was given, he became unresponsive with unfocused eyes; also noted to have left arm shaking. HR was low in the 40s but O2 sats were normal; BP was not obtained during the episode but was obtained about 2 minutes later and it was norml. Pt was drowsy but arousable and able to answer questions after the episode. Pt seen at bedside on third floor; wife at bedside. Wife reports he's had episodes similar to this for about 1.5 years however he has never been evaluated for them. Wife denies associated urinary or fecal incontinence, tongue biting, or whole body shaking associated with these episodes. Pt has an essential tremor and always has BL arm/hand shaking which is at it's baseline per Pt and wife. No personal or FHx seizure. Does not measure his BP or HR at home or during these episodes of unresponsiveness. Follows with Dr. Coleman for his pAF; also has tachy-loan syndrome but does not have a PPM; Pt and wife states PPM has been discussed with them  previously but there were no plans to insert one as he was previously asymptomatic. Reports feeling well prior to his fistulogram; denies fevers, chills, cough, nausea, vomiting, diarrhea, or dysuria.     Medicine consulted for evaluation of transfer to hospital medicine.     Procedure(s) (LRB):  PTA, AV FISTULA (Left)  Fistulogram (N/A)      Hospital Course:     History of Present Illness:   68 year old male with ESRD s/p kidney transplant x2 (1992, 2005) on chronic immunosuppressive medication, CAD s/p PCI (2006), HTN, HLD, Chronic diastolic CHF, tachycardia-bradycardia syndrome with periods of pAF admitted to vascular surgery. Presented for outpatient fistulogram (recently had the fistula created on 12/18/23) with plans to start HD however had a period of unresponsiveness after the procedure. Received protamine for heparin reversal and 30 mins after the protamine was given, he became unresponsive with unfocused eyes; also noted to have left arm shaking. HR was low in the 40s but O2 sats were normal; BP was not obtained during the episode but was obtained about 2 minutes later and it was norml. Pt was drowsy but arousable and able to answer questions after the episode. Pt seen at bedside on third floor; wife at bedside. Wife reports he's had episodes similar to this for about 1.5 years however he has never been evaluated for them. Wife denies associated urinary or fecal incontinence, tongue biting, or whole body shaking associated with these episodes. Pt has an essential tremor and always has BL arm/hand shaking which is at it's baseline per Pt and wife. No personal or FHx seizure. Does not measure his BP or HR at home or during these episodes of unresponsiveness. Follows with Dr. Coleman for his pAF; also has tachy-loan syndrome but does not have a PPM; Pt and wife states PPM has been discussed with them previously but there were no plans to insert one as he was previously asymptomatic. Reports feeling well  prior to his fistulogram; denies fevers, chills, cough, nausea, vomiting, diarrhea, or dysuria.      He was transferred to medicine this AM. Denies any complaints. Does report SOB & orthopnea the past several months that has been stable. Denies weight gain or edema. Denies syncopal episodes other than the event after procedure yesterday. Denies palpitations. Refusing dialysis. Wants to go home.     HOSPITAL COURSE  Syncope after vascular procedure. No syncopal episodes or events on telemetry were noted after admission. IV Lasix was given noting BNP > 2000 and overload on exam. No O2 requirements. K 5.3,  resolved after Kayexalate. KTM was consulted and felt that he needed dialysis but patient declines, prefers to f/u with his outpt nephrologist. 1 u prbc's given for Hb 6.7, H/H responded appropriately. F/U with PCP scheduled next week. He will contact his nephrologist for an earlier appt. Repeat RFP in 1 wk.      Goals of Care Treatment Preferences:  Code Status: Full Code    Health care agent: Pt's wife is CRISTY  Health care agent number: No value filed.          What is most important right now is to focus on spending time at home, remaining as independent as possible, symptom/pain control, quality of life, even if it means sacrificing a little time.  Accordingly, we have decided that the best plan to meet the patient's goals includes continuing with treatment.      Consults:   Consults (From admission, onward)          Status Ordering Provider     Inpatient consult to Electrophysiology  Once        Provider:  (Not yet assigned)    Completed MARIO ALBERTO HANSEN     Inpatient consult to Kidney/Pancreas Transplant Medicine  Once        Provider:  (Not yet assigned)    Completed MARIO ALBERTO HANSEN     Inpatient consult to Hospital Medicine-General  Once        Provider:  (Not yet assigned)    Completed PHILIP MEI            No new Assessment & Plan notes have been filed under this hospital service since the last note was  generated.  Service: Hospital Medicine    Final Active Diagnoses:    Diagnosis Date Noted POA    PRINCIPAL PROBLEM:  Syncope [R55] 02/19/2024 Yes    Symptomatic bradycardia [R00.1] 02/19/2024 Yes    GERD (gastroesophageal reflux disease) [K21.9] 02/19/2024 Yes    ESRD (end stage renal disease) [N18.6] 02/02/2024 Yes    CKD (chronic kidney disease) stage 5, GFR less than 15 ml/min [N18.5] 11/06/2023 Yes    Benign essential tremor [G25.0] 10/06/2023 Yes     Chronic    Anemia in stage 5 chronic kidney disease, not on chronic dialysis [N18.5, D63.1] 08/30/2023 Yes    Paroxysmal atrial fibrillation [I48.0] 09/25/2017 Yes     Chronic    (HFpEF) heart failure with preserved ejection fraction [I50.30] 09/25/2017 Yes    H/O kidney transplant [Z94.0] 07/10/2015 Not Applicable     Chronic    Mixed hyperlipidemia [E78.2]  Yes     Chronic    Primary hypertension [I10]  Yes    CAD (coronary artery disease) [I25.10]  Yes     Chronic    Thrombocytopenia [D69.6]  Yes      Problems Resolved During this Admission:       Discharged Condition: stable    Disposition: Home or Self Care    Follow Up:   Follow-up Information       Your nephrologist. Schedule an appointment as soon as possible for a visit.                           Patient Instructions:      RENAL FUNCTION PANEL   Standing Status: Future Standing Exp. Date: 04/21/25     Diet renal   Order Comments: Low sodium diet (2 grams daily). Do not drink more than 1500 mL fluids daily     Notify your health care provider if you experience any of the following:  temperature >100.4     Notify your health care provider if you experience any of the following:  persistent nausea and vomiting or diarrhea     Notify your health care provider if you experience any of the following:  severe uncontrolled pain     Notify your health care provider if you experience any of the following:  increased confusion or weakness     Notify your health care provider if you experience any of the following:   persistent dizziness, light-headedness, or visual disturbances     Activity as tolerated       Significant Diagnostic Studies: N/A    Pending Diagnostic Studies:       None           Medications:  Reconciled Home Medications:      Medication List        START taking these medications      sevelamer carbonate 800 mg Tab  Commonly known as: RENVELA  Take 1 tablet (800 mg total) by mouth 3 (three) times daily with meals.     sodium zirconium cyclosilicate 10 gram packet  Commonly known as: Lokelma  Take 1 packet (10 g total) by mouth once daily. Mix entire contents of packet(s) into drinking glass containing 3 tablespoons of water; stir well and drink immediately. Add water and repeat until no powder remains to receive entire dose.            CHANGE how you take these medications      furosemide 80 MG tablet  Commonly known as: LASIX  Take 1 tablet (80 mg total) by mouth 2 (two) times a day.  What changed:   when to take this  Another medication with the same name was removed. Continue taking this medication, and follow the directions you see here.     metoprolol succinate 25 MG 24 hr tablet  Commonly known as: TOPROL-XL  Take 0.5 tablets (12.5 mg total) by mouth once daily.  Start taking on: February 22, 2024  What changed: when to take this            CONTINUE taking these medications      acetaminophen 500 MG tablet  Commonly known as: TYLENOL  Take 1 tablet (500 mg total) by mouth every 6 (six) hours as needed for Pain.     albuterol 90 mcg/actuation inhaler  Commonly known as: VENTOLIN HFA  Inhale 2 puffs into the lungs every 6 (six) hours as needed for Wheezing or Shortness of Breath. Rescue     albuterol-ipratropium 2.5 mg-0.5 mg/3 mL nebulizer solution  Commonly known as: DUO-NEB  Take 3 mLs by nebulization every 6 (six) hours as needed for Wheezing. Rescue. Can be used in place of albuterol inhaler.     ALLEGRA ORAL  Take 1 tablet by mouth daily as needed (allergies).     amiodarone 200 MG Tab  Commonly known  as: PACERONE  Take 1 tablet (200 mg total) by mouth once daily.     apixaban 5 mg Tab  Commonly known as: ELIQUIS  Take 1 tablet (5 mg total) by mouth 2 (two) times daily.     atorvastatin 40 MG tablet  Commonly known as: LIPITOR  Take 2 tablets (80 mg total) by mouth once daily.     b complex vitamins tablet  Commonly known as: B COMPLEX-VITAMIN B12  Take 1 tablet by mouth once daily.     CALCIUM 600 ORAL  Take 1 tablet by mouth 2 (two) times a day.     CHOLECALCIFEROL (VITAMIN D3) ORAL  Take 2 tablets by mouth 2 (two) times daily.     clopidogreL 75 mg tablet  Commonly known as: PLAVIX  Take 1 tablet (75 mg total) by mouth once daily.     doxazosin 4 MG tablet  Commonly known as: CARDURA  Take 1 tablet (4 mg total) by mouth every 12 (twelve) hours.     famotidine 20 MG tablet  Commonly known as: PEPCID  Take 1 tablet (20 mg total) by mouth 2 (two) times daily.     fluticasone propionate 50 mcg/actuation nasal spray  Commonly known as: FLONASE  1 spray (50 mcg total) by Each Nostril route daily as needed for Allergies.     FLUZONE HIGHDOSE QUAD 23-24  mcg/0.7 mL Syrg  Generic drug: flu vacc [id number](65yr up)-PF     gabapentin 100 MG capsule  Commonly known as: NEURONTIN  Take 1 capsule (100 mg total) by mouth as needed (pain).     hydrALAZINE 50 MG tablet  Commonly known as: APRESOLINE  TAKE 1 TABLET (50 MG TOTAL) BY MOUTH 3 (THREE) TIMES DAILY.     HYDROcodone-acetaminophen 5-325 mg per tablet  Commonly known as: NORCO  Take 1 tablet by mouth every 6 (six) hours as needed for Pain.     isosorbide mononitrate 30 MG 24 hr tablet  Commonly known as: IMDUR  Take 4 tablets (120 mg total) by mouth once daily.     MITIGARE 0.6 mg Cap  Generic drug: colchicine  TAKE 1 CAPSULE BY MOUTH TWICE A DAY AS NEEDED GOUT FLARE UP TO 3 DAYS     multivitamin per tablet  Commonly known as: THERAGRAN  Take 1 tablet by mouth Daily.     NIFEdipine 60 MG (OSM) 24 hr tablet  Commonly known as: PROCARDIA-XL  Take 1 tablet (60 mg  total) by mouth 2 (two) times a day.     omeprazole 20 MG capsule  Commonly known as: PRILOSEC  Take 1 capsule (20 mg total) by mouth daily as needed (heartburn).     paricalcitoL 1 MCG capsule  Commonly known as: ZEMPLAR  TAKE 1 CAPSULE ON MONDAY, WEDNESDAY AND FRIDAY     PFIZER COVID BIVAL(12Y UP)(PF) 30 mcg/0.3 mL injection  Generic drug: sars-cov-2 (covid-19 pfizer omicron)  Inject into the muscle.     potassium chloride 10 MEQ Tbsr  Commonly known as: KLOR-CON  Take 1 tablet (10 mEq total) by mouth daily as needed (to be taken with lasix).,     predniSONE 5 MG tablet  Commonly known as: DELTASONE  Take 1 tablet (5 mg total) by mouth once daily.     pulse oximeter device  Commonly known as: pulse oximeter  by Apply Externally route 2 (two) times a day. Use twice daily at 8 AM and 3 PM and record the value in [employer] as directed.     SPIKEVAX 8663-0766(12Y UP)(PF) 50 mcg/0.5 mL injection  Generic drug: COVID [id number](12up)(andu)(PF)     tacrolimus 1 MG Cap  Commonly known as: PROGRAF  TAKE 3 CAPSULES (3 MG TOTAL) BY MOUTH EVERY 12 (TWELVE) HOURS.     tiotropium bromide 1.25 mcg/actuation inhaler  Commonly known as: SPIRIVA RESPIMAT  Inhale 2 puffs into the lungs once daily. Controller. Use this inhaler every day.     traZODone 50 MG tablet  Commonly known as: DESYREL  Take 1 tablet (50 mg total) by mouth nightly as needed for Insomnia.            STOP taking these medications      calcium acetate(phosphat bind) 667 mg capsule  Commonly known as: PHOSLO            ASK your doctor about these medications      fluticasone furoate-vilanteroL 100-25 mcg/dose diskus inhaler  Commonly known as: BREO  Inhale 1 puff into the lungs once daily. Controller. Use this inhaler every day.     nitroGLYCERIN 0.4 MG SL tablet  Commonly known as: NITROSTAT  Place 1 tablet (0.4 mg total) under the tongue every 5 (five) minutes as needed for Chest pain.              Indwelling Lines/Drains at time of discharge:   Lines/Drains/Airways        Drain  Duration                  Hemodialysis AV Fistula 02/19/24 0924 Left upper arm 2 days                    Time spent on the discharge of patient: 35 minutes of time spent on discharge, including examining the patient, providing discharge instructions, arranging follow up, and documentation.            Nadira Shaffer MD  Department of Hospital Medicine  [facility] - Cardiology Stepdown

## 2024-02-21 NOTE — PLAN OF CARE
[facility] - Cardiology Stepdown  Discharge Final Note    Primary Care Provider: Anatoliy Colindres MD    Expected Discharge Date: 2/21/2024    Final Discharge Note (most recent)       Final Note - 02/21/24 1109          Final Note    Assessment Type Final Discharge Note     Anticipated Discharge Disposition Home or Self Care     Hospital Resources/Appts/Education Provided Appointments scheduled and added to AVS                   Kerri Bray LMSW [facility]  [id number]      Future Appointments   Date Time Provider Department Center   2/26/2024  3:00 PM Lavon Santacruz MD [facility] PCW   2/27/2024  2:00 PM VASCULAR, LAB [facility] VASCL[facility]   2/27/2024  2:45 PM JUANI Melendez III, MD [facility] VASCSUR [facility]   3/12/2024 11:20 AM PRE-ADMIT NURSE 1, ENDO -[facility] ENDO[address]   4/2/2024 10:30 AM Emre Latif MD [facility] NEPH [place]   8/2/2024  9:00 AM Anatoliy Colindres MD [facility]       Contact Info       Your nephrologist        Next Steps: Schedule an appointment as soon as possible for a visit

## 2024-02-21 NOTE — PROGRESS NOTES
[facility] - Cardiology Stepdown  Kidney Transplant  Progress Note      Reason for Follow-up: Reassessment of Kidney Transplant Referral - 10/24/2023 - Declined recipient and management of immunosuppression.       Subjective:   History of Present Illness:  68 year old male with ESRD s/p kidney transplant x2 (1992, 2005) on chronic immunosuppressive medications Prograf 3mg BID and Prednisone 5mg QD, CAD s/p PCI (2006), HTN, HLD, Chronic diastolic CHF, tachycardia-bradycardia syndrome with periods of pAF admitted to vascular surgery. Presented for outpatient fistulogram (recently had the fistula created on 12/18/23) with plans to start HD however had a period of unresponsiveness after the procedure. Pt was drowsy but arousable and able to answer questions after the episode. Wife reports he's had episodes similar to this for about 1.5 years however he has never been evaluated for them. Wife denies associated urinary or fecal incontinence, tongue biting, or whole body shaking associated with these episodes. Pt has an essential tremor and always has BL arm/hand shaking which is at it's baseline per Pt and wife. No personal or FHx seizure. Does not measure his BP or HR at home or during these episodes of unresponsiveness.     KTM consulted for immunosuppressive medication management and need for dialysis.    Mr. Phelps is a 68 y.o. year old male who is status post Kidney Transplant Referral - 10/24/2023 - Declined.    His maintenance immunosuppression consists of:   Immunosuppressants (From admission, onward)      Start     Stop Route Frequency Ordered    02/19/24 1800  tacrolimus capsule 3 mg         -- Oral 2 times daily 02/19/24 1344            Interval History:  No acute events overnight, K normalized 4.4, on room air, oriented and following commands.  Me & Dr Webb had a discussion with him regarding dialysis but he still refused and wants to follow up with his Nephrologist as outpatient and proceed after that. Told  him if he feels any SOB or worsening symptoms, he should visit ED.  Recommended Lasix 80mg BID, Lokelma for hyperkalemia and Sevelamer for high phosphorous. He was able to understand everything and agreed to follow up with his Nehrologist.    Past Medical, Surgical, Family, and Social History:   Unchanged from H&P.    Scheduled Meds:   amiodarone  200 mg Oral Daily    apixaban  5 mg Oral BID    atorvastatin  80 mg Oral Daily    calcium acetate(phosphat bind)  667 mg Oral TID WM    clopidogreL  75 mg Oral Daily    fluticasone furoate-vilanteroL  1 puff Inhalation Daily    hydrALAZINE  50 mg Oral TID    isosorbide mononitrate  120 mg Oral Daily    metoprolol succinate  12.5 mg Oral Daily    multivitamin  1 tablet Oral Daily    NIFEdipine  60 mg Oral BID    pantoprazole  40 mg Oral Daily    paricalcitoL  1 mcg Oral Every Mon, Wed, Fri    predniSONE  5 mg Oral Daily    sodium zirconium cyclosilicate  10 g Oral Daily    tacrolimus  3 mg Oral BID    tiotropium bromide  2 puff Inhalation Daily     Continuous Infusions:  PRN Meds:0.9%  NaCl infusion (for blood administration), albuterol, albuterol-ipratropium, dextrose 10%, dextrose 10%, gabapentin, glucagon (human recombinant), glucose, glucose, insulin aspart U-100, nitroGLYCERIN, sodium chloride 0.9%, traZODone    Intake/Output - Last 3 Shifts         02/19 0700  02/20 0659 02/20 0700  02/21 0659 02/21 0700  02/22 0659    P.O.  840 240    Blood  288     Total Intake(mL/kg)  1128 (12.5) 240 (2.7)    Urine (mL/kg/hr) 150 1650 (0.8) 900 (2.1)    Stool   0    Total Output 150 1650 900    Net -150 -522 -660           Stool Occurrence   1 x             Review of Systems   Constitutional: Negative.    HENT: Negative.     Eyes: Negative.    Respiratory: Negative.     Cardiovascular: Negative.    Gastrointestinal: Negative.    Endocrine: Negative.    Genitourinary: Negative.    Neurological: Negative.    Hematological: Negative.    Psychiatric/Behavioral: Negative.       "  Objective:     Vital Signs (Most Recent):  Temp: 98.2 °F (36.8 °C) (02/21/24 0750)  Pulse: (!) 55 (02/21/24 1137)  Resp: 18 (02/21/24 0945)  BP: 138/65 (02/21/24 0750)  SpO2: 96 % (02/21/24 0945) Vital Signs (24h Range):  Temp:  [97.3 °F (36.3 °C)-98.2 °F (36.8 °C)] 98.2 °F (36.8 °C)  Pulse:  [49-64] 55  Resp:  [18] 18  SpO2:  [96 %-98 %] 96 %  BP: (128-165)/(58-72) 138/65     Weight: 88.3 kg (194 lb 10.7 oz)  Height: 6' 1" (185.4 cm)  Body mass index is 25.68 kg/m².     Physical Exam  Constitutional:       Appearance: Normal appearance.   Pulmonary:      Breath sounds: Rhonchi and rales present.   Musculoskeletal:      Cervical back: Normal range of motion.      Right lower leg: Edema present.      Left lower leg: Edema present.   Skin:     General: Skin is warm.   Neurological:      General: No focal deficit present.      Mental Status: He is alert and oriented to person, place, and time.   Psychiatric:         Mood and Affect: Mood normal.         Behavior: Behavior normal.          Laboratory:  CBC:   Recent Labs   Lab 02/19/24  1125 02/20/24  0307 02/21/24  0306 02/21/24  0923   WBC 6.67 4.02 8.02  --    RBC 2.97* 2.66* 2.77*  --    HGB 7.7* 6.7* 7.1* 8.0*   HCT 23.9* 21.2* 22.4*  --    * 130* 121*  --    MCV 81* 80* 81*  --    MCH 25.9* 25.2* 25.6*  --    MCHC 32.2 31.6* 31.7*  --      BMP:   Recent Labs   Lab 02/19/24  1125 02/20/24  0307 02/21/24  0306    113* 92    142 143   K 4.0 5.3* 4.4   * 113* 112*   CO2 18* 20* 21*   BUN 52* 57* 67*   CREATININE 7.4* 7.8* 8.2*   CALCIUM 9.3 9.3 8.8       Assessment/Plan:     CKD (chronic kidney disease) stage 5, GFR less than 15 ml/min  S/P KT  CKD V with baseline Scr 2.8-3.2, eGFR < 15, high risk of requiring dialysis  CXR; pulmonary infiltrates with edema picture, having SOB on exertion, BNP 2100  LUE AVG functional according to Vasular surgeon, good bruit and palpable thrill on examination  Patient refused to give consent for dialysis, " spent almost 15min tried to convince him but still refused and saying ;  ''its my body, my decision, I am not allowing anyone to touch my graft until it heals, I will think over dialysis after 1 week or will talk to my own Nephrologist''    Plan;  -Follow up visit with his Nephrologist  -Return to ED if develops any SOB  -Lasix 80mg BID on discharge, Lokelma for hyperkalemia, Sevelamer for hyperphosphatemia  -Still refused dialysis, wants to discuss dialysis options with his Nephrologist and will proceed after that  -AV graft is ready to use per vascular sahil    H/O kidney transplant  S/P 2 KT 1992 and 2005  Home immunosuppresants; Tacro 3mg BID and Pred 5mg QD  Baseline Scr 2.8-3.2, CKD V  Nifedipine 60mg BID  Would keep the current dose of immunosuppresants  Look for any infection, rising WBC, Fever.      CAD (coronary artery disease)  CAD s/p CABG 2006  Per primary        Medical decision making for this encounter includes review of pertinent labs and diagnostic studies, assessment and planning, discussions with consulting providers, discussion with patient/family, and participation in multidisciplinary rounds. Time spent caring for patient: 30 minutes    Cece James MD  Kidney Transplant  [facility] - Cardiology Stepdown

## 2024-02-21 NOTE — ASSESSMENT & PLAN NOTE
S/P KT  CKD V with baseline Scr 2.8-3.2, eGFR < 15, high risk of requiring dialysis  CXR; pulmonary infiltrates with edema picture, having SOB on exertion, BNP 2100  LUE AVG functional according to Vasular surgeon, good bruit and palpable thrill on examination  Patient refused to give consent for dialysis, spent almost 15min tried to convince him but still refused and saying ;  ''its my body, my decision, I am not allowing anyone to touch my graft until it heals, I will think over dialysis after 1 week or will talk to my own Nephrologist''    Plan;  -Follow up visit with his Nephrologist  -Return to ED if develops any SOB  -Lasix 80mg BID on discharge, Lokelma for hyperkalemia, Sevelamer for hyperphosphatemia  -Still refused dialysis, wants to discuss dialysis options with his Nephrologist and will proceed after that  -AV graft is ready to use per vascular sahil

## 2024-02-21 NOTE — PROGRESS NOTES
[facility] ([facility]) Team notified of pt referral via [facility] (automated inbasket notification) .    Patient screened today 2-21-24 by provider and RN for enrollment to program.      Pt was deemed not a candidate for enrollment at this time related to patient is currently on hemo-dialysis. PT PP AV Fistula Stenosis    Pt will require additional follow up planning per primary team.     If pt status, diagnosis, or treatment plan changes , please place AMB referral to [facility] ([id number]) for [facility] enrollment re-evalution.

## 2024-02-22 ENCOUNTER — PATIENT MESSAGE (OUTPATIENT)
Dept: NEPHROLOGY | Facility: CLINIC | Age: 69
End: 2024-02-22
Payer: MEDICARE

## 2024-02-23 ENCOUNTER — HOSPITAL ENCOUNTER (INPATIENT)
Facility: HOSPITAL | Age: 69
LOS: 8 days | Discharge: HOME | DRG: 321 | End: 2024-03-02
Attending: STUDENT IN AN ORGANIZED HEALTH CARE EDUCATION/TRAINING PROGRAM | Admitting: INTERNAL MEDICINE
Payer: MEDICARE

## 2024-02-23 DIAGNOSIS — D63.1 ANEMIA OF CHRONIC KIDNEY FAILURE, STAGE 5: ICD-10-CM

## 2024-02-23 DIAGNOSIS — N18.5 ANEMIA OF CHRONIC KIDNEY FAILURE, STAGE 5: ICD-10-CM

## 2024-02-23 DIAGNOSIS — I50.32 CHRONIC HEART FAILURE WITH PRESERVED EJECTION FRACTION: ICD-10-CM

## 2024-02-23 DIAGNOSIS — D63.1 ANEMIA IN STAGE 5 CHRONIC KIDNEY DISEASE, NOT ON CHRONIC DIALYSIS: ICD-10-CM

## 2024-02-23 DIAGNOSIS — Z79.899 IMMUNOCOMPROMISED STATE DUE TO DRUG THERAPY: ICD-10-CM

## 2024-02-23 DIAGNOSIS — E83.30 PHOSPHORUS METABOLISM DISORDER: ICD-10-CM

## 2024-02-23 DIAGNOSIS — I48.0 PAROXYSMAL ATRIAL FIBRILLATION: Chronic | ICD-10-CM

## 2024-02-23 DIAGNOSIS — E78.2 MIXED HYPERLIPIDEMIA: Chronic | ICD-10-CM

## 2024-02-23 DIAGNOSIS — I50.9 ACUTE ON CHRONIC CONGESTIVE HEART FAILURE, UNSPECIFIED HEART FAILURE TYPE: ICD-10-CM

## 2024-02-23 DIAGNOSIS — R79.89 ELEVATED TROPONIN: ICD-10-CM

## 2024-02-23 DIAGNOSIS — I49.9 CARDIAC RHYTHM DISORDER OR DISTURBANCE OR CHANGE: ICD-10-CM

## 2024-02-23 DIAGNOSIS — I50.33 ACUTE ON CHRONIC DIASTOLIC HEART FAILURE: ICD-10-CM

## 2024-02-23 DIAGNOSIS — R06.02 SHORTNESS OF BREATH: ICD-10-CM

## 2024-02-23 DIAGNOSIS — N18.6 ESRD (END STAGE RENAL DISEASE): ICD-10-CM

## 2024-02-23 DIAGNOSIS — I25.10 CAD (CORONARY ARTERY DISEASE): ICD-10-CM

## 2024-02-23 DIAGNOSIS — E87.6 HYPOKALEMIA: ICD-10-CM

## 2024-02-23 DIAGNOSIS — N18.5 ANEMIA IN STAGE 5 CHRONIC KIDNEY DISEASE, NOT ON CHRONIC DIALYSIS: ICD-10-CM

## 2024-02-23 DIAGNOSIS — N18.6 END STAGE KIDNEY DISEASE: ICD-10-CM

## 2024-02-23 DIAGNOSIS — R07.9 CHEST PAIN, UNSPECIFIED TYPE: ICD-10-CM

## 2024-02-23 DIAGNOSIS — Z29.89 PROPHYLACTIC IMMUNOTHERAPY: ICD-10-CM

## 2024-02-23 DIAGNOSIS — I21.4 NSTEMI (NON-ST ELEVATED MYOCARDIAL INFARCTION): Primary | ICD-10-CM

## 2024-02-23 DIAGNOSIS — D84.821 IMMUNOCOMPROMISED STATE DUE TO DRUG THERAPY: ICD-10-CM

## 2024-02-23 DIAGNOSIS — T86.12 FAILED KIDNEY TRANSPLANT: ICD-10-CM

## 2024-02-23 LAB
ALBUMIN SERPL BCP-MCNC: 3.1 G/DL (ref 3.5–5.2)
ALLENS TEST: ABNORMAL
ALLENS TEST: NORMAL
ALP SERPL-CCNC: 55 U/L (ref 55–135)
ALT SERPL W/O P-5'-P-CCNC: 19 U/L (ref 10–44)
ANION GAP SERPL CALC-SCNC: 16 MMOL/L (ref 8–16)
ANION GAP SERPL CALC-SCNC: 16 MMOL/L (ref 8–16)
ANION GAP SERPL CALC-SCNC: 19 MMOL/L (ref 8–16)
APTT PPP: 31.3 SEC (ref 21–32)
APTT PPP: 56.8 SEC (ref 21–32)
AST SERPL-CCNC: 7 U/L (ref 10–40)
BASOPHILS # BLD AUTO: 0.01 K/UL (ref 0–0.2)
BASOPHILS # BLD AUTO: 0.03 K/UL (ref 0–0.2)
BASOPHILS NFR BLD: 0.1 % (ref 0–1.9)
BASOPHILS NFR BLD: 0.4 % (ref 0–1.9)
BILIRUB SERPL-MCNC: 0.5 MG/DL (ref 0.1–1)
BNP SERPL-MCNC: 1929 PG/ML (ref 0–99)
BUN SERPL-MCNC: 64 MG/DL (ref 8–23)
BUN SERPL-MCNC: 65 MG/DL (ref 8–23)
BUN SERPL-MCNC: 65 MG/DL (ref 8–23)
CALCIUM SERPL-MCNC: 8.7 MG/DL (ref 8.7–10.5)
CALCIUM SERPL-MCNC: 8.7 MG/DL (ref 8.7–10.5)
CALCIUM SERPL-MCNC: 8.8 MG/DL (ref 8.7–10.5)
CHLORIDE SERPL-SCNC: 108 MMOL/L (ref 95–110)
CHLORIDE SERPL-SCNC: 111 MMOL/L (ref 95–110)
CHLORIDE SERPL-SCNC: 111 MMOL/L (ref 95–110)
CO2 SERPL-SCNC: 17 MMOL/L (ref 23–29)
CREAT SERPL-MCNC: 7.8 MG/DL (ref 0.5–1.4)
CREAT SERPL-MCNC: 7.8 MG/DL (ref 0.5–1.4)
CREAT SERPL-MCNC: 8.2 MG/DL (ref 0.5–1.4)
DIFFERENTIAL METHOD BLD: ABNORMAL
DIFFERENTIAL METHOD BLD: ABNORMAL
EOSINOPHIL # BLD AUTO: 0.2 K/UL (ref 0–0.5)
EOSINOPHIL # BLD AUTO: 0.3 K/UL (ref 0–0.5)
EOSINOPHIL NFR BLD: 1.7 % (ref 0–8)
EOSINOPHIL NFR BLD: 3.5 % (ref 0–8)
ERYTHROCYTE [DISTWIDTH] IN BLOOD BY AUTOMATED COUNT: 17.4 % (ref 11.5–14.5)
ERYTHROCYTE [DISTWIDTH] IN BLOOD BY AUTOMATED COUNT: 17.9 % (ref 11.5–14.5)
EST. GFR  (NO RACE VARIABLE): 6.5 ML/MIN/1.73 M^2
EST. GFR  (NO RACE VARIABLE): 6.9 ML/MIN/1.73 M^2
EST. GFR  (NO RACE VARIABLE): 6.9 ML/MIN/1.73 M^2
GLUCOSE SERPL-MCNC: 163 MG/DL (ref 70–110)
GLUCOSE SERPL-MCNC: 93 MG/DL (ref 70–110)
GLUCOSE SERPL-MCNC: 93 MG/DL (ref 70–110)
HCO3 UR-SCNC: 18.3 MMOL/L (ref 24–28)
HCT VFR BLD AUTO: 23.5 % (ref 40–54)
HCT VFR BLD AUTO: 25.7 % (ref 40–54)
HGB BLD-MCNC: 7.7 G/DL (ref 14–18)
HGB BLD-MCNC: 8 G/DL (ref 14–18)
IMM GRANULOCYTES # BLD AUTO: 0.02 K/UL (ref 0–0.04)
IMM GRANULOCYTES # BLD AUTO: 0.03 K/UL (ref 0–0.04)
IMM GRANULOCYTES NFR BLD AUTO: 0.2 % (ref 0–0.5)
IMM GRANULOCYTES NFR BLD AUTO: 0.3 % (ref 0–0.5)
INFLUENZA A, MOLECULAR: NEGATIVE
INFLUENZA B, MOLECULAR: NEGATIVE
INR PPP: 1.4 (ref 0.8–1.2)
LACTATE SERPL-SCNC: 1.8 MMOL/L (ref 0.5–2.2)
LDH SERPL L TO P-CCNC: 1.71 MMOL/L (ref 0.5–2.2)
LYMPHOCYTES # BLD AUTO: 1.3 K/UL (ref 1–4.8)
LYMPHOCYTES # BLD AUTO: 1.5 K/UL (ref 1–4.8)
LYMPHOCYTES NFR BLD: 15.6 % (ref 18–48)
LYMPHOCYTES NFR BLD: 17.4 % (ref 18–48)
MCH RBC QN AUTO: 25.6 PG (ref 27–31)
MCH RBC QN AUTO: 26.2 PG (ref 27–31)
MCHC RBC AUTO-ENTMCNC: 31.1 G/DL (ref 32–36)
MCHC RBC AUTO-ENTMCNC: 32.8 G/DL (ref 32–36)
MCV RBC AUTO: 80 FL (ref 82–98)
MCV RBC AUTO: 82 FL (ref 82–98)
MONOCYTES # BLD AUTO: 0.6 K/UL (ref 0.3–1)
MONOCYTES # BLD AUTO: 0.9 K/UL (ref 0.3–1)
MONOCYTES NFR BLD: 6.8 % (ref 4–15)
MONOCYTES NFR BLD: 9.8 % (ref 4–15)
NEUTROPHILS # BLD AUTO: 6.1 K/UL (ref 1.8–7.7)
NEUTROPHILS # BLD AUTO: 6.2 K/UL (ref 1.8–7.7)
NEUTROPHILS NFR BLD: 70.7 % (ref 38–73)
NEUTROPHILS NFR BLD: 73.5 % (ref 38–73)
NRBC BLD-RTO: 0 /100 WBC
NRBC BLD-RTO: 0 /100 WBC
OHS QRS DURATION: 106 MS
OHS QRS DURATION: 112 MS
OHS QRS DURATION: 112 MS
OHS QTC CALCULATION: 470 MS
OHS QTC CALCULATION: 484 MS
OHS QTC CALCULATION: 528 MS
PCO2 BLDA: 28.7 MMHG (ref 35–45)
PH SMN: 7.41 [PH] (ref 7.35–7.45)
PHOSPHATE SERPL-MCNC: 3.8 MG/DL (ref 2.7–4.5)
PHOSPHATE SERPL-MCNC: 4.9 MG/DL (ref 2.7–4.5)
PLATELET # BLD AUTO: 121 K/UL (ref 150–450)
PLATELET # BLD AUTO: 132 K/UL (ref 150–450)
PMV BLD AUTO: ABNORMAL FL (ref 9.2–12.9)
PMV BLD AUTO: ABNORMAL FL (ref 9.2–12.9)
PO2 BLDA: 38 MMHG (ref 40–60)
POC BE: -6 MMOL/L
POC SATURATED O2: 74 % (ref 95–100)
POC TCO2: 19 MMOL/L (ref 24–29)
POTASSIUM SERPL-SCNC: 2.9 MMOL/L (ref 3.5–5.1)
POTASSIUM SERPL-SCNC: 2.9 MMOL/L (ref 3.5–5.1)
POTASSIUM SERPL-SCNC: 4.3 MMOL/L (ref 3.5–5.1)
PROT SERPL-MCNC: 6.1 G/DL (ref 6–8.4)
PROTHROMBIN TIME: 14.8 SEC (ref 9–12.5)
RBC # BLD AUTO: 2.94 M/UL (ref 4.6–6.2)
RBC # BLD AUTO: 3.13 M/UL (ref 4.6–6.2)
SAMPLE: ABNORMAL
SAMPLE: NORMAL
SARS-COV-2 RDRP RESP QL NAA+PROBE: NEGATIVE
SITE: ABNORMAL
SITE: NORMAL
SODIUM SERPL-SCNC: 144 MMOL/L (ref 136–145)
SPECIMEN SOURCE: NORMAL
TROPONIN I SERPL DL<=0.01 NG/ML-MCNC: 0.08 NG/ML (ref 0–0.03)
TROPONIN I SERPL DL<=0.01 NG/ML-MCNC: 4.21 NG/ML (ref 0–0.03)
WBC # BLD AUTO: 8.26 K/UL (ref 3.9–12.7)
WBC # BLD AUTO: 8.75 K/UL (ref 3.9–12.7)

## 2024-02-23 PROCEDURE — 85730 THROMBOPLASTIN TIME PARTIAL: CPT | Performed by: STUDENT IN AN ORGANIZED HEALTH CARE EDUCATION/TRAINING PROGRAM

## 2024-02-23 PROCEDURE — U0002 COVID-19 LAB TEST NON-CDC: HCPCS

## 2024-02-23 PROCEDURE — 99223 1ST HOSP IP/OBS HIGH 75: CPT | Mod: GC,,, | Performed by: INTERNAL MEDICINE

## 2024-02-23 PROCEDURE — 25000003 PHARM REV CODE 250: Performed by: STUDENT IN AN ORGANIZED HEALTH CARE EDUCATION/TRAINING PROGRAM

## 2024-02-23 PROCEDURE — 25000242 PHARM REV CODE 250 ALT 637 W/ HCPCS

## 2024-02-23 PROCEDURE — 27000221 HC OXYGEN, UP TO 24 HOURS

## 2024-02-23 PROCEDURE — 84100 ASSAY OF PHOSPHORUS: CPT

## 2024-02-23 PROCEDURE — 87502 INFLUENZA DNA AMP PROBE: CPT

## 2024-02-23 PROCEDURE — 99285 EMERGENCY DEPT VISIT HI MDM: CPT | Mod: 25

## 2024-02-23 PROCEDURE — 82803 BLOOD GASES ANY COMBINATION: CPT

## 2024-02-23 PROCEDURE — 85025 COMPLETE CBC W/AUTO DIFF WBC: CPT | Mod: 91 | Performed by: STUDENT IN AN ORGANIZED HEALTH CARE EDUCATION/TRAINING PROGRAM

## 2024-02-23 PROCEDURE — 63600175 PHARM REV CODE 636 W HCPCS: Performed by: STUDENT IN AN ORGANIZED HEALTH CARE EDUCATION/TRAINING PROGRAM

## 2024-02-23 PROCEDURE — 96374 THER/PROPH/DIAG INJ IV PUSH: CPT

## 2024-02-23 PROCEDURE — 94640 AIRWAY INHALATION TREATMENT: CPT | Mod: XB

## 2024-02-23 PROCEDURE — 99900035 HC TECH TIME PER 15 MIN (STAT)

## 2024-02-23 PROCEDURE — 93005 ELECTROCARDIOGRAM TRACING: CPT

## 2024-02-23 PROCEDURE — 93010 ELECTROCARDIOGRAM REPORT: CPT | Mod: 76,,, | Performed by: INTERNAL MEDICINE

## 2024-02-23 PROCEDURE — 84484 ASSAY OF TROPONIN QUANT: CPT | Mod: 91

## 2024-02-23 PROCEDURE — 93010 ELECTROCARDIOGRAM REPORT: CPT | Mod: ,,, | Performed by: INTERNAL MEDICINE

## 2024-02-23 PROCEDURE — 83605 ASSAY OF LACTIC ACID: CPT

## 2024-02-23 PROCEDURE — 27000190 HC CPAP FULL FACE MASK W/VALVE

## 2024-02-23 PROCEDURE — 94660 CPAP INITIATION&MGMT: CPT

## 2024-02-23 PROCEDURE — 83880 ASSAY OF NATRIURETIC PEPTIDE: CPT

## 2024-02-23 PROCEDURE — 63600175 PHARM REV CODE 636 W HCPCS

## 2024-02-23 PROCEDURE — 85025 COMPLETE CBC W/AUTO DIFF WBC: CPT

## 2024-02-23 PROCEDURE — 36415 COLL VENOUS BLD VENIPUNCTURE: CPT | Mod: XB | Performed by: INTERNAL MEDICINE

## 2024-02-23 PROCEDURE — 63600175 PHARM REV CODE 636 W HCPCS: Mod: JZ,JG | Performed by: INTERNAL MEDICINE

## 2024-02-23 PROCEDURE — 80069 RENAL FUNCTION PANEL: CPT | Performed by: STUDENT IN AN ORGANIZED HEALTH CARE EDUCATION/TRAINING PROGRAM

## 2024-02-23 PROCEDURE — 25000003 PHARM REV CODE 250: Performed by: INTERNAL MEDICINE

## 2024-02-23 PROCEDURE — 87040 BLOOD CULTURE FOR BACTERIA: CPT | Mod: 59

## 2024-02-23 PROCEDURE — 25000242 PHARM REV CODE 250 ALT 637 W/ HCPCS: Performed by: STUDENT IN AN ORGANIZED HEALTH CARE EDUCATION/TRAINING PROGRAM

## 2024-02-23 PROCEDURE — 85730 THROMBOPLASTIN TIME PARTIAL: CPT | Mod: 91 | Performed by: INTERNAL MEDICINE

## 2024-02-23 PROCEDURE — 94761 N-INVAS EAR/PLS OXIMETRY MLT: CPT | Mod: XB

## 2024-02-23 PROCEDURE — 96376 TX/PRO/DX INJ SAME DRUG ADON: CPT

## 2024-02-23 PROCEDURE — 80053 COMPREHEN METABOLIC PANEL: CPT

## 2024-02-23 PROCEDURE — 20600001 HC STEP DOWN PRIVATE ROOM

## 2024-02-23 PROCEDURE — 96375 TX/PRO/DX INJ NEW DRUG ADDON: CPT

## 2024-02-23 PROCEDURE — 25000003 PHARM REV CODE 250

## 2024-02-23 PROCEDURE — 99223 1ST HOSP IP/OBS HIGH 75: CPT | Mod: AI,GC,, | Performed by: INTERNAL MEDICINE

## 2024-02-23 PROCEDURE — 85610 PROTHROMBIN TIME: CPT | Performed by: STUDENT IN AN ORGANIZED HEALTH CARE EDUCATION/TRAINING PROGRAM

## 2024-02-23 PROCEDURE — 84484 ASSAY OF TROPONIN QUANT: CPT | Performed by: STUDENT IN AN ORGANIZED HEALTH CARE EDUCATION/TRAINING PROGRAM

## 2024-02-23 PROCEDURE — 36415 COLL VENOUS BLD VENIPUNCTURE: CPT | Performed by: STUDENT IN AN ORGANIZED HEALTH CARE EDUCATION/TRAINING PROGRAM

## 2024-02-23 RX ORDER — SODIUM CHLORIDE 9 MG/ML
INJECTION, SOLUTION INTRAVENOUS ONCE
Status: COMPLETED | OUTPATIENT
Start: 2024-02-23 | End: 2024-02-29

## 2024-02-23 RX ORDER — ONDANSETRON HYDROCHLORIDE 2 MG/ML
4 INJECTION, SOLUTION INTRAVENOUS
Status: COMPLETED | OUTPATIENT
Start: 2024-02-23 | End: 2024-02-23

## 2024-02-23 RX ORDER — ATORVASTATIN CALCIUM 20 MG/1
80 TABLET, FILM COATED ORAL DAILY
Status: DISCONTINUED | OUTPATIENT
Start: 2024-02-23 | End: 2024-03-02 | Stop reason: HOSPADM

## 2024-02-23 RX ORDER — GABAPENTIN 100 MG/1
100 CAPSULE ORAL DAILY PRN
Status: DISCONTINUED | OUTPATIENT
Start: 2024-02-23 | End: 2024-03-02 | Stop reason: HOSPADM

## 2024-02-23 RX ORDER — NIFEDIPINE 60 MG/1
60 TABLET, EXTENDED RELEASE ORAL 2 TIMES DAILY
Status: DISCONTINUED | OUTPATIENT
Start: 2024-02-23 | End: 2024-03-02 | Stop reason: HOSPADM

## 2024-02-23 RX ORDER — AMIODARONE HYDROCHLORIDE 200 MG/1
200 TABLET ORAL DAILY
Status: DISCONTINUED | OUTPATIENT
Start: 2024-02-23 | End: 2024-03-02 | Stop reason: HOSPADM

## 2024-02-23 RX ORDER — MORPHINE SULFATE 4 MG/ML
4 INJECTION, SOLUTION INTRAMUSCULAR; INTRAVENOUS
Status: COMPLETED | OUTPATIENT
Start: 2024-02-23 | End: 2024-02-23

## 2024-02-23 RX ORDER — HEPARIN SOD,PORCINE/0.9 % NACL 1000/500ML
INTRAVENOUS SOLUTION INTRAVENOUS
Status: DISCONTINUED | OUTPATIENT
Start: 2024-02-23 | End: 2024-02-23

## 2024-02-23 RX ORDER — MORPHINE SULFATE 2 MG/ML
1 INJECTION, SOLUTION INTRAMUSCULAR; INTRAVENOUS ONCE
Status: COMPLETED | OUTPATIENT
Start: 2024-02-23 | End: 2024-02-23

## 2024-02-23 RX ORDER — ASPIRIN 325 MG
325 TABLET ORAL
Status: COMPLETED | OUTPATIENT
Start: 2024-02-23 | End: 2024-02-23

## 2024-02-23 RX ORDER — FUROSEMIDE 80 MG/1
80 TABLET ORAL 2 TIMES DAILY
Status: DISCONTINUED | OUTPATIENT
Start: 2024-02-23 | End: 2024-02-23

## 2024-02-23 RX ORDER — TACROLIMUS 1 MG/1
3 CAPSULE ORAL 2 TIMES DAILY
Status: DISCONTINUED | OUTPATIENT
Start: 2024-02-24 | End: 2024-03-02 | Stop reason: HOSPADM

## 2024-02-23 RX ORDER — SODIUM CHLORIDE 0.9 % (FLUSH) 0.9 %
10 SYRINGE (ML) INJECTION
Status: DISCONTINUED | OUTPATIENT
Start: 2024-02-23 | End: 2024-02-23

## 2024-02-23 RX ORDER — IPRATROPIUM BROMIDE AND ALBUTEROL SULFATE 2.5; .5 MG/3ML; MG/3ML
3 SOLUTION RESPIRATORY (INHALATION) EVERY 6 HOURS PRN
Status: DISCONTINUED | OUTPATIENT
Start: 2024-02-23 | End: 2024-03-02 | Stop reason: HOSPADM

## 2024-02-23 RX ORDER — NITROGLYCERIN 0.4 MG/1
0.4 TABLET SUBLINGUAL EVERY 5 MIN PRN
Status: DISCONTINUED | OUTPATIENT
Start: 2024-02-23 | End: 2024-03-02 | Stop reason: HOSPADM

## 2024-02-23 RX ORDER — HEPARIN SODIUM,PORCINE/D5W 25000/250
0-40 INTRAVENOUS SOLUTION INTRAVENOUS CONTINUOUS
Status: DISCONTINUED | OUTPATIENT
Start: 2024-02-23 | End: 2024-02-25

## 2024-02-23 RX ORDER — PREDNISONE 2.5 MG/1
5 TABLET ORAL DAILY
Status: DISCONTINUED | OUTPATIENT
Start: 2024-02-23 | End: 2024-03-02 | Stop reason: HOSPADM

## 2024-02-23 RX ORDER — DOXAZOSIN 2 MG/1
4 TABLET ORAL EVERY 12 HOURS
Status: DISCONTINUED | OUTPATIENT
Start: 2024-02-23 | End: 2024-02-23

## 2024-02-23 RX ORDER — HYDRALAZINE HYDROCHLORIDE 50 MG/1
50 TABLET, FILM COATED ORAL EVERY 8 HOURS
Status: DISCONTINUED | OUTPATIENT
Start: 2024-02-23 | End: 2024-03-02 | Stop reason: HOSPADM

## 2024-02-23 RX ORDER — SODIUM CHLORIDE 9 MG/ML
INJECTION, SOLUTION INTRAVENOUS
Status: DISCONTINUED | OUTPATIENT
Start: 2024-02-23 | End: 2024-02-29

## 2024-02-23 RX ORDER — FAMOTIDINE 20 MG/1
20 TABLET, FILM COATED ORAL DAILY
Status: DISCONTINUED | OUTPATIENT
Start: 2024-02-23 | End: 2024-03-02 | Stop reason: HOSPADM

## 2024-02-23 RX ORDER — IPRATROPIUM BROMIDE AND ALBUTEROL SULFATE 2.5; .5 MG/3ML; MG/3ML
3 SOLUTION RESPIRATORY (INHALATION)
Status: COMPLETED | OUTPATIENT
Start: 2024-02-23 | End: 2024-02-23

## 2024-02-23 RX ORDER — POTASSIUM CHLORIDE 20 MEQ/1
60 TABLET, EXTENDED RELEASE ORAL ONCE
Status: COMPLETED | OUTPATIENT
Start: 2024-02-23 | End: 2024-02-23

## 2024-02-23 RX ORDER — GABAPENTIN 100 MG/1
100 CAPSULE ORAL
Status: DISCONTINUED | OUTPATIENT
Start: 2024-02-23 | End: 2024-02-23

## 2024-02-23 RX ORDER — HYDROCODONE BITARTRATE AND ACETAMINOPHEN 5; 325 MG/1; MG/1
1 TABLET ORAL EVERY 6 HOURS PRN
Status: DISCONTINUED | OUTPATIENT
Start: 2024-02-23 | End: 2024-03-02 | Stop reason: HOSPADM

## 2024-02-23 RX ORDER — SEVELAMER CARBONATE 800 MG/1
800 TABLET, FILM COATED ORAL
Status: DISCONTINUED | OUTPATIENT
Start: 2024-02-23 | End: 2024-03-02 | Stop reason: HOSPADM

## 2024-02-23 RX ORDER — ASPIRIN 81 MG/1
81 TABLET ORAL DAILY
Status: DISCONTINUED | OUTPATIENT
Start: 2024-02-24 | End: 2024-03-02 | Stop reason: HOSPADM

## 2024-02-23 RX ORDER — SODIUM CHLORIDE 0.9 % (FLUSH) 0.9 %
10 SYRINGE (ML) INJECTION
Status: DISCONTINUED | OUTPATIENT
Start: 2024-02-23 | End: 2024-03-02 | Stop reason: HOSPADM

## 2024-02-23 RX ORDER — ONDANSETRON HYDROCHLORIDE 2 MG/ML
INJECTION, SOLUTION INTRAVENOUS
Status: COMPLETED
Start: 2024-02-23 | End: 2024-02-23

## 2024-02-23 RX ORDER — LIDOCAINE HYDROCHLORIDE 20 MG/ML
INJECTION, SOLUTION INFILTRATION; PERINEURAL
Status: DISCONTINUED | OUTPATIENT
Start: 2024-02-23 | End: 2024-02-23

## 2024-02-23 RX ORDER — MUPIROCIN 20 MG/G
OINTMENT TOPICAL 2 TIMES DAILY
Status: DISPENSED | OUTPATIENT
Start: 2024-02-23 | End: 2024-02-28

## 2024-02-23 RX ORDER — METHYLPREDNISOLONE SOD SUCC 125 MG
125 VIAL (EA) INJECTION
Status: COMPLETED | OUTPATIENT
Start: 2024-02-23 | End: 2024-02-23

## 2024-02-23 RX ORDER — POTASSIUM CHLORIDE 20 MEQ/1
40 TABLET, EXTENDED RELEASE ORAL ONCE
Status: DISCONTINUED | OUTPATIENT
Start: 2024-02-23 | End: 2024-02-23

## 2024-02-23 RX ORDER — NITROGLYCERIN 0.4 MG/1
TABLET SUBLINGUAL
Status: COMPLETED
Start: 2024-02-23 | End: 2024-02-23

## 2024-02-23 RX ORDER — DOXAZOSIN 2 MG/1
4 TABLET ORAL EVERY 12 HOURS
Status: DISCONTINUED | OUTPATIENT
Start: 2024-02-23 | End: 2024-03-02 | Stop reason: HOSPADM

## 2024-02-23 RX ADMIN — METOPROLOL SUCCINATE 12.5 MG: 25 TABLET, EXTENDED RELEASE ORAL at 01:02

## 2024-02-23 RX ADMIN — AMIODARONE HYDROCHLORIDE 200 MG: 200 TABLET ORAL at 01:02

## 2024-02-23 RX ADMIN — THERA TABS 1 TABLET: TAB at 01:02

## 2024-02-23 RX ADMIN — ASPIRIN 325 MG ORAL TABLET 325 MG: 325 PILL ORAL at 07:02

## 2024-02-23 RX ADMIN — MORPHINE SULFATE 1 MG: 2 INJECTION, SOLUTION INTRAMUSCULAR; INTRAVENOUS at 06:02

## 2024-02-23 RX ADMIN — MORPHINE SULFATE 4 MG: 4 INJECTION INTRAVENOUS at 10:02

## 2024-02-23 RX ADMIN — HEPARIN SODIUM AND DEXTROSE 12 UNITS/KG/HR: 10000; 5 INJECTION INTRAVENOUS at 08:02

## 2024-02-23 RX ADMIN — NITROGLYCERIN 0.4 MG: 0.4 TABLET, ORALLY DISINTEGRATING SUBLINGUAL at 07:02

## 2024-02-23 RX ADMIN — POTASSIUM CHLORIDE 60 MEQ: 1500 TABLET, EXTENDED RELEASE ORAL at 07:02

## 2024-02-23 RX ADMIN — HYDRALAZINE HYDROCHLORIDE 50 MG: 50 TABLET ORAL at 05:02

## 2024-02-23 RX ADMIN — DOXAZOSIN 4 MG: 2 TABLET ORAL at 05:02

## 2024-02-23 RX ADMIN — IPRATROPIUM BROMIDE AND ALBUTEROL SULFATE 3 ML: .5; 3 SOLUTION RESPIRATORY (INHALATION) at 06:02

## 2024-02-23 RX ADMIN — MUPIROCIN: 20 OINTMENT TOPICAL at 08:02

## 2024-02-23 RX ADMIN — FAMOTIDINE 20 MG: 20 TABLET ORAL at 01:02

## 2024-02-23 RX ADMIN — SEVELAMER CARBONATE 800 MG: 800 TABLET, FILM COATED ORAL at 04:02

## 2024-02-23 RX ADMIN — TIOTROPIUM BROMIDE INHALATION SPRAY 2 PUFF: 1.56 SPRAY, METERED RESPIRATORY (INHALATION) at 06:02

## 2024-02-23 RX ADMIN — HYDRALAZINE HYDROCHLORIDE 50 MG: 50 TABLET ORAL at 09:02

## 2024-02-23 RX ADMIN — PREDNISONE 5 MG: 2.5 TABLET ORAL at 01:02

## 2024-02-23 RX ADMIN — NIFEDIPINE 60 MG: 60 TABLET, FILM COATED, EXTENDED RELEASE ORAL at 01:02

## 2024-02-23 RX ADMIN — FUROSEMIDE 80 MG: 80 TABLET ORAL at 01:02

## 2024-02-23 RX ADMIN — ONDANSETRON 4 MG: 2 INJECTION INTRAMUSCULAR; INTRAVENOUS at 07:02

## 2024-02-23 RX ADMIN — ONDANSETRON HYDROCHLORIDE 4 MG: 2 INJECTION, SOLUTION INTRAVENOUS at 07:02

## 2024-02-23 RX ADMIN — ATORVASTATIN CALCIUM 80 MG: 20 TABLET, FILM COATED ORAL at 01:02

## 2024-02-23 RX ADMIN — MORPHINE SULFATE 4 MG: 4 INJECTION INTRAVENOUS at 07:02

## 2024-02-23 RX ADMIN — METHYLPREDNISOLONE SODIUM SUCCINATE 125 MG: 125 INJECTION, POWDER, FOR SOLUTION INTRAMUSCULAR; INTRAVENOUS at 06:02

## 2024-02-23 RX ADMIN — NIFEDIPINE 60 MG: 60 TABLET, FILM COATED, EXTENDED RELEASE ORAL at 08:02

## 2024-02-23 NOTE — NURSING
Patient admitted to CSU. Patient arrived to floor from Cath Lab no evidence of distress; patient AAO x4 at this time. Patient placed on tele. Vital signs obtained. VSS. Patient voices no complaints at this time. Plan of care initiated with patient. Bed in lowest position, locked, SR up x2, call bell in reach. Will continue to monitor patient.

## 2024-02-23 NOTE — ASSESSMENT & PLAN NOTE
BMP reviewed- noted Estimated Creatinine Clearance: 10.1 mL/min (A) (based on SCr of 7.8 mg/dL (H)). according to latest data. Based on current GFR, CKD stage is end stage.  Monitor UOP and serial BMP and adjust therapy as needed. Renally dose meds. Avoid nephrotoxic medications and procedures.    Pt not on HD yet nearing ESRD.

## 2024-02-23 NOTE — HPI
Mr. Phelps is a a 69 yo male with a PMHx of paroxysmal atrial fibrillation on amiodarone 200mg qd and apixaban 5mg bid, tachy-loan syndrome, HFpEF with most recent LVEF 65%, CAD s/p PCI 2006 on plavix, HTN, HLD, aortic atherosclerosis, secondary hyperparathyroidism, diverticulosis, COPD, GERD, hypothyroidism, gout, a history of tobacco use, obesity, ESRD s/p kidney transplant x2 (1992, 2005) on chronic immunosuppressive medication who was admitted to [facility] on 02/23/2024 with crushing chest pain, that improved on admission with nitro x3 and morphine x2. He currently experiences SOB with minimal exertion. In the ED HR was in 60-70s, -180s, Plts 121. K 2.9, Cr 7.8. Trop 0.08. BNP 1929. LA 1.71. CXR with pulmonary edema and pleural effusion on the right. ECG with NSR with PACs, and ST depressions in inferior leads and non-specific changes. Admitted to CCU, and taken to cath lab for procedure. However, patient not fully aware of risks of procedure and kidney dysfunction, so LHC was aborted on 2/23/2024. Patient changed from Apixaban to heparin. Patient was initially refusing hemodialysis, but finally agreed with it on 02/24/2024, where 2L were removed. Patient was stepped down from CCU, and repeat Troponin 10.66 (2/26) vs 13.2 (2/25) vs 4.2 (2/23) . Interventional Cardiology was consulted for ischemic evaluation.     Hb today (2/26/24) 6.4, Creat 7.0

## 2024-02-23 NOTE — PLAN OF CARE
Problem: Adult Inpatient Plan of Care  Goal: Plan of Care Review  Outcome: Ongoing, Progressing     Problem: Adult Inpatient Plan of Care  Goal: Absence of Hospital-Acquired Illness or Injury  Outcome: Ongoing, Progressing     Problem: Fall Injury Risk  Goal: Absence of Fall and Fall-Related Injury  Outcome: Ongoing, Progressing     Problem: Adult Inpatient Plan of Care  Goal: Optimal Comfort and Wellbeing  Outcome: Ongoing, Progressing     Plan of care discussed with patient.  Patient ambulating with assistance, fall precautions in place. Patient has no complaints of pain.  Heparin at 12 U/kg . Therapeutic x1. Next aptt at 2200. Discussed medications and care. Patient has no questions at this time. Will continue to monitor.

## 2024-02-23 NOTE — PROGRESS NOTES
02/23/24 1644   Vital Signs   BP (!) 179/88   MAP (mmHg) 124   BP Location Right arm   Patient Position Lying     Pt received Doxazosin one time dose, as well as Hydralazine per YIN Acevedo MD order. Will continue to monitor.

## 2024-02-23 NOTE — CONSULTS
[facility] - Cardiology Step[facility]  Kidney Transplant  Consult Note    Inpatient consult to Kidney/Pancreas Transplant Medicine  Consult performed by: Cece James MD  Consult ordered by: Greg Barroso MD            Subjective:     History of Present Illness:     68 year old male with ESRD s/p kidney transplant x2 (1992, 2005) on chronic immunosuppressive medications Prograf 3mg BID and Prednisone 5mg QD, CAD s/p PCI (2006), HTN, HLD, Chronic diastolic CHF, tachycardia-bradycardia syndrome with periods of pAF admitted to vascular surgery.     C/C; Pt presented with crushing chest pain. Trop elevated to .08. ECG with ST depressions in inferior leads and dynamic changes. Bedside ECHO without WMA. Discussed case with IC staff. Initial plan for LHC although chest pain improved after 3 nitro and morphine.     Last admission; Presented for outpatient fistulogram (recently had the fistula created on 12/18/23) with plans to start HD however had a period of unresponsiveness after the procedure. Pt was drowsy but arousable and able to answer questions after the episode. Wife reports he's had episodes similar to this for about 1.5 years however he has never been evaluated for them. Wife denies associated urinary or fecal incontinence, tongue biting, or whole body shaking associated with these episodes. Pt has an essential tremor and always has BL arm/hand shaking which is at it's baseline per Pt and wife. No personal or FHx seizure. Does not measure his BP or HR at home or during these episodes of unresponsiveness.      KTM consulted for immunosuppressive medication management and need for dialysis.    Mr. Phelps is a 68 y.o. year old male who is status post Kidney Transplant Referral - 10/24/2023 - Declined  consists of:   Immunosuppressants (From admission, onward)      Start     Stop Route Frequency Ordered    02/24/24 0800  tacrolimus capsule 3 mg         -- Oral 2 times daily 02/23/24 1327            Past Medical and  Surgical History: Mr. Phelps has a past medical history of Atrial fibrillation, CAD (coronary artery disease) (2006), CHF (congestive heart failure) (2017), CKD (chronic kidney disease) stage 3, GFR 30-59 ml/min, CKD (chronic kidney disease) stage 5, GFR less than 15 ml/min (02/02/2024), COVID-19 (02/07/2023), Diverticulosis, Hyperlipidemia, Hypertension, Keloid cicatrix, Metabolic bone disease, Other emphysema (10/01/2019), Pericarditis, S/P kidney transplant (1992), Thrombocytopenia, Thyroid disease, and Tobacco use (09/05/2014).  He has a past surgical history that includes Cholecystectomy; Cardioversion (04/30/15); Kidney transplant (2005); Parathyroidectomy; Colonoscopy (April 20, 2011); Colonoscopy (N/A, 3/13/2020); Colonoscopy (N/A, 2/4/2021); Colonoscopy (N/A, 3/3/2021); Coronary angioplasty with stent (9/13/2006); Treatment of cardiac arrhythmia (N/A, 3/28/2022); irrigation and debridement (Right, 8/30/2023); AV fistula placement (Left, 12/18/2023); Percutaneous transluminal angioplasty of arteriovenous fistula (Left, 2/19/2024); and Fistulogram (N/A, 2/19/2024).    Past Social and Family History: Mr. Phelps reports that he has been smoking cigarettes. He started smoking about 42 years ago. He has a 20.0 pack-year smoking history. He has never used smokeless tobacco. He reports that he does not drink alcohol and does not use drugs.His family history includes Heart disease in his father; Kidney disease in his brother and sister; Kidney failure in his brother and sister; No Known Problems in his brother, sister, sister, and sister; Thyroid disease in his mother.    Intake/Output - Last 3 Shifts       None             Review of Systems   Constitutional:  Positive for fatigue.   Respiratory:  Positive for cough and shortness of breath.    Cardiovascular:  Positive for chest pain.   Gastrointestinal: Negative.    Genitourinary: Negative.    Musculoskeletal: Negative.    Psychiatric/Behavioral:  Positive for  "agitation.      Objective:     Vital Signs (Most Recent):  Temp: 98.3 °F (36.8 °C) (02/23/24 1644)  Pulse: 68 (02/23/24 1644)  Resp: 18 (02/23/24 1644)  BP: (!) 179/88 (02/23/24 1644)  SpO2: 97 % (02/23/24 1644) Vital Signs (24h Range):  Temp:  [97.8 °F (36.6 °C)-98.8 °F (37.1 °C)] 98.3 °F (36.8 °C)  Pulse:  [] 68  Resp:  [14-24] 18  SpO2:  [95 %-100 %] 97 %  BP: (157-181)/(72-88) 179/88     Weight: 88.3 kg (194 lb 10.7 oz)  Height: 6' 1" (185.4 cm)  Body mass index is 25.68 kg/m².     Physical Exam  Constitutional:       Appearance: He is ill-appearing.   Pulmonary:      Breath sounds: Rhonchi and rales present.   Musculoskeletal:      Right lower leg: Edema present.      Left lower leg: Edema present.   Neurological:      General: No focal deficit present.      Mental Status: He is alert and oriented to person, place, and time.          Significant Labs:  CBC:   Recent Labs   Lab 02/21/24  0306 02/21/24  0923 02/23/24  0609 02/23/24  0715   WBC 8.02  --  8.26 8.75   RBC 2.77*  --  3.13* 2.94*   HGB 7.1* 8.0* 8.0* 7.7*   HCT 22.4*  --  25.7* 23.5*   *  --  121* 132*   MCV 81*  --  82 80*   MCH 25.6*  --  25.6* 26.2*   MCHC 31.7*  --  31.1* 32.8     BMP:   Recent Labs   Lab 02/21/24  0306 02/23/24  0609 02/23/24  1442   GLU 92 93  93 163*    144  144 144   K 4.4 2.9*  2.9* 4.3   * 111*  111* 108   CO2 21* 17*  17* 17*   BUN 67* 65*  65* 64*   CREATININE 8.2* 7.8*  7.8* 8.2*   CALCIUM 8.8 8.7  8.7 8.8       Diagnostics:  Chest X-Ray: No results found. However, due to the size of the patient record, not all encounters were searched. Please check Results Review for a complete set of results.  Assessment/Plan:          CKD (chronic kidney disease) stage 5, GFR less than 15 ml/min  S/P KT  CKD V with baseline Scr 2.8-3.2, eGFR < 15, high risk of requiring dialysis  CXR; pulmonary infiltrates with edema picture, having SOB on exertion, BNP 1900  LUE AVG functional according to Tiffanieular " surgeon, good bruit and palpable thrill on examination  Patient refused to give consent for dialysis, tried to convince him but still refused and saying ; ''I will wait till tomorrow morrning, if lasix is not working then I will give consent for dialysis.''   Dr Webb, me and the cardiology team talked to him separately but he is not willing to give consent for dialysis.     Plan;  -High risk of needing dialysis, will wait till tomorrow when he give consent  -Meanwhile can try high doses of lasix (was already on 80mg)  -I/Os  -Daily CBC, BMP, 12h FK levels          H/O kidney transplant  S/P 2 KT 1992 and 2005  Home immunosuppresants; Tacro 3mg BID and Pred 5mg QD  Baseline Scr 2.8-3.2, CKD V  Nifedipine 60mg BID  Would keep the current dose of immunosuppressants            CAD (coronary artery disease)  CAD s/p CABG 2006  NSTEMI 02/23/23   Per cardiology    Electrolytes;  Non emergent         Acidosis;       HCO3 17       NaHCO3 1300mg BID    Cece James MD  Kidney Transplant  [facility] - Cardiology Stepdown

## 2024-02-23 NOTE — NURSING
Pt refusing HD. States wants to discuss and sign consent with nephrology first. MD Webb notified.

## 2024-02-23 NOTE — ED NOTES
Care assumed from Jessie RN    APPEARANCE: awake, alert, and appears to be in moderate discomfort. Pain score 4/10. Remains on cont cardiac monitoring, auto BP cuff, and cont pulse. Bed in lowest and locked position w/ side rails up x2. Supportive spouse remains at bedside.   SKIN: warm, dry and intact. No visible breakdown or bruising noted to extremities. HD fistula to LUE  MUSCULOSKELETAL: Patient moving all extremities spontaneously, no obvious swelling or deformities noted. Ambulates independently.  RESPIRATORY: Airway open and patent . +shortness of breath - on 2L of O2 via NC. +tachypnea.   CARDIAC: +tachycardia - atrial fibrillation on cardiac monitor. +4/10 right anterior thoracic chest wall pain, 2+ DP pulses bilaterally; no peripheral edema noted   ABDOMEN: S/ND/NT, Denies N/V/D. +decrease in appetite  : voids spontaneously, denies difficulty  NEUROLOGIC: AAO x 4; speaking and following commands appropriately. eEqual strength in all extremities; denies numbness/tingling. +tremors

## 2024-02-23 NOTE — SUBJECTIVE & OBJECTIVE
Subjective:     History of Present Illness:     68 year old male with ESRD s/p kidney transplant x2 (1992, 2005) on chronic immunosuppressive medications Prograf 3mg BID and Prednisone 5mg QD, CAD s/p PCI (2006), HTN, HLD, Chronic diastolic CHF, tachycardia-bradycardia syndrome with periods of pAF admitted to vascular surgery.     C/C; Pt presented with crushing chest pain. Trop elevated to .08. ECG with ST depressions in inferior leads and dynamic changes. Bedside ECHO without WMA. Discussed case with IC staff. Initial plan for LHC although chest pain improved after 3 nitro and morphine.     Last admission; Presented for outpatient fistulogram (recently had the fistula created on 12/18/23) with plans to start HD however had a period of unresponsiveness after the procedure. Pt was drowsy but arousable and able to answer questions after the episode. Wife reports he's had episodes similar to this for about 1.5 years however he has never been evaluated for them. Wife denies associated urinary or fecal incontinence, tongue biting, or whole body shaking associated with these episodes. Pt has an essential tremor and always has BL arm/hand shaking which is at it's baseline per Pt and wife. No personal or FHx seizure. Does not measure his BP or HR at home or during these episodes of unresponsiveness.      KTM consulted for immunosuppressive medication management and need for dialysis.    Mr. Phelps is a 68 y.o. year old male who is status post Kidney Transplant Referral - 10/24/2023 - Declined  consists of:   Immunosuppressants (From admission, onward)      Start     Stop Route Frequency Ordered    02/24/24 0800  tacrolimus capsule 3 mg         -- Oral 2 times daily 02/23/24 1327            Past Medical and Surgical History: Mr. Phelps has a past medical history of Atrial fibrillation, CAD (coronary artery disease) (2006), CHF (congestive heart failure) (2017), CKD (chronic kidney disease) stage 3, GFR 30-59 ml/min, CKD  (chronic kidney disease) stage 5, GFR less than 15 ml/min (02/02/2024), COVID-19 (02/07/2023), Diverticulosis, Hyperlipidemia, Hypertension, Keloid cicatrix, Metabolic bone disease, Other emphysema (10/01/2019), Pericarditis, S/P kidney transplant (1992), Thrombocytopenia, Thyroid disease, and Tobacco use (09/05/2014).  He has a past surgical history that includes Cholecystectomy; Cardioversion (04/30/15); Kidney transplant (2005); Parathyroidectomy; Colonoscopy (April 20, 2011); Colonoscopy (N/A, 3/13/2020); Colonoscopy (N/A, 2/4/2021); Colonoscopy (N/A, 3/3/2021); Coronary angioplasty with stent (9/13/2006); Treatment of cardiac arrhythmia (N/A, 3/28/2022); irrigation and debridement (Right, 8/30/2023); AV fistula placement (Left, 12/18/2023); Percutaneous transluminal angioplasty of arteriovenous fistula (Left, 2/19/2024); and Fistulogram (N/A, 2/19/2024).    Past Social and Family History: Mr. Phelps reports that he has been smoking cigarettes. He started smoking about 42 years ago. He has a 20.0 pack-year smoking history. He has never used smokeless tobacco. He reports that he does not drink alcohol and does not use drugs.His family history includes Heart disease in his father; Kidney disease in his brother and sister; Kidney failure in his brother and sister; No Known Problems in his brother, sister, sister, and sister; Thyroid disease in his mother.    Intake/Output - Last 3 Shifts       None             Review of Systems   Constitutional:  Positive for fatigue.   Respiratory:  Positive for cough and shortness of breath.    Cardiovascular:  Positive for chest pain.   Gastrointestinal: Negative.    Genitourinary: Negative.    Musculoskeletal: Negative.    Psychiatric/Behavioral:  Positive for agitation.      Objective:     Vital Signs (Most Recent):  Temp: 98.3 °F (36.8 °C) (02/23/24 1644)  Pulse: 68 (02/23/24 1644)  Resp: 18 (02/23/24 1644)  BP: (!) 179/88 (02/23/24 1644)  SpO2: 97 % (02/23/24 1644) Vital  "Signs (24h Range):  Temp:  [97.8 °F (36.6 °C)-98.8 °F (37.1 °C)] 98.3 °F (36.8 °C)  Pulse:  [] 68  Resp:  [14-24] 18  SpO2:  [95 %-100 %] 97 %  BP: (157-181)/(72-88) 179/88     Weight: 88.3 kg (194 lb 10.7 oz)  Height: 6' 1" (185.4 cm)  Body mass index is 25.68 kg/m².     Physical Exam  Constitutional:       Appearance: He is ill-appearing.   Pulmonary:      Breath sounds: Rhonchi and rales present.   Musculoskeletal:      Right lower leg: Edema present.      Left lower leg: Edema present.   Neurological:      General: No focal deficit present.      Mental Status: He is alert and oriented to person, place, and time.          Significant Labs:  CBC:   Recent Labs   Lab 02/21/24  0306 02/21/24  0923 02/23/24  0609 02/23/24  0715   WBC 8.02  --  8.26 8.75   RBC 2.77*  --  3.13* 2.94*   HGB 7.1* 8.0* 8.0* 7.7*   HCT 22.4*  --  25.7* 23.5*   *  --  121* 132*   MCV 81*  --  82 80*   MCH 25.6*  --  25.6* 26.2*   MCHC 31.7*  --  31.1* 32.8     BMP:   Recent Labs   Lab 02/21/24  0306 02/23/24  0609 02/23/24  1442   GLU 92 93  93 163*    144  144 144   K 4.4 2.9*  2.9* 4.3   * 111*  111* 108   CO2 21* 17*  17* 17*   BUN 67* 65*  65* 64*   CREATININE 8.2* 7.8*  7.8* 8.2*   CALCIUM 8.8 8.7  8.7 8.8       Diagnostics:  Chest X-Ray: No results found. However, due to the size of the patient record, not all encounters were searched. Please check Results Review for a complete set of results.  "

## 2024-02-23 NOTE — PROGRESS NOTES
Pharmacist Renal Dose Adjustment Note    Ibrahima Phelps is a 68 y.o. male being treated with the medication Famotidine    Patient Data:    Vital Signs (Most Recent):  Temp: 97.8 °F (36.6 °C) (02/23/24 1154)  Pulse: 83 (02/23/24 1153)  Resp: 20 (02/23/24 1153)  BP: (!) 165/81 (02/23/24 1153)  SpO2: 95 % (02/23/24 1153) Vital Signs (72h Range):  Temp:  [97.8 °F (36.6 °C)-98.8 °F (37.1 °C)]   Pulse:  []   Resp:  [14-24]   BP: (157-181)/(72-83)   SpO2:  [95 %-100 %]      Recent Labs   Lab 02/20/24  0307 02/21/24  0306 02/23/24  0609   CREATININE 7.8* 8.2* 7.8*     Serum creatinine: 7.8 mg/dL (H) 02/23/24 0609  Estimated creatinine clearance: 10.1 mL/min (A)    Medication: Famotidine 20 mg po bid will be changed to Famotidine 20 mg po daily    Pharmacist's Name: Daysi Young  Pharmacist's Extension: [phone]

## 2024-02-23 NOTE — ED TRIAGE NOTES
Per EMS, pt woken up from sleep with SOB. Pt gave himself a breathing treatment, but stated he became more labored and decided to call EMS. Pt was initially 88% on EMS arrival, placed on CPAP, now satting 100%. Hx CHF.

## 2024-02-23 NOTE — SUBJECTIVE & OBJECTIVE
"Past Medical History:   Diagnosis Date    Atrial fibrillation     CAD (coronary artery disease) 2006    MI in 2006    CHF (congestive heart failure) 2017    CKD (chronic kidney disease) stage 3, GFR 30-59 ml/min     Dr. Latif.  in transplanted kidney    CKD (chronic kidney disease) stage 5, GFR less than 15 ml/min 02/02/2024    COVID-19 02/07/2023    Diverticulosis     Hyperlipidemia     Hypertension     Keloid cicatrix     Metabolic bone disease     Other emphysema 10/01/2019    Pericarditis     S/P kidney transplant 1992    x2 (1992 & 2005),    Thrombocytopenia     Thyroid disease     Tobacco use 09/05/2014       Past Surgical History:   Procedure Laterality Date    AV FISTULA PLACEMENT Left 12/18/2023    Procedure: CREATION, AV FISTULA;  Surgeon: JUANI Melendez III, MD;  [facility];  Service: Vascular;  Laterality: Left;  LUE AVF creation vs AVG insertion    CARDIOVERSION  04/30/15    CHOLECYSTECTOMY      COLONOSCOPY  April 20, 2011    Diverticulosis, repeat recommended in 3 yrs., repeat colonoscopy 2014 revealed 2 polyps.  He should return in 5 years.    COLONOSCOPY N/A 3/13/2020    Procedure: COLONOSCOPY;  Surgeon: Chon Casper MD;  Location: [facility] (4TH FLR);  Service: Endoscopy;  Laterality: N/A;  ok to hold coumadin x5days-see telephone encounter 2/4/20-tb    COLONOSCOPY N/A 2/4/2021    Procedure: COLONOSCOPY;  Surgeon: Chon Casper MD;  Location: [facility] (4TH FLR);  Service: Endoscopy;  Laterality: N/A;  Eliquis - per Dr. GAVIN Blunt ok to hold x 2 days and "restarted asap"- ERW  last prep "poor", [id number] ordered/ Pt requests Dr. Casper  prep ins. mailed - COVID screening on 2/1/21 PCW- ERW    COLONOSCOPY N/A 3/3/2021    Procedure: COLONOSCOPY;  Surgeon: Chon Casper MD;  Location: [facility] (4TH FLR);  Service: Endoscopy;  Laterality: N/A;  Patient with his second poor bowel pre and has poor prep today.  If patient not intersted or can't get colonoscopy tomorrow will " need constipation bowel prep and will need to restart his Eliquis today.Thanks,Chon     per Dr Lopez to hold Eliquis 2 days prior      COVID     CORONARY ANGIOPLASTY WITH STENT PLACEMENT  9/13/2006    FISTULOGRAM N/A 2/19/2024    Procedure: Fistulogram;  Surgeon: JUANI Melendez III, MD;  Location: [facility] CATH LAB;  Service: Peripheral Vascular;  Laterality: N/A;    IRRIGATION AND DEBRIDEMENT Right 8/30/2023    Procedure: IRRIGATION AND DEBRIDEMENT, RIGHT MIDDLE FINGER;  Surgeon: Martin Larsen MD;  Location: [facility] OR [facility] FLR;  Service: Orthopedics;  Laterality: Right;    KIDNEY TRANSPLANT  2005    PARATHYROIDECTOMY      PERCUTANEOUS TRANSLUMINAL ANGIOPLASTY OF ARTERIOVENOUS FISTULA Left 2/19/2024    Procedure: PTA, AV FISTULA;  Surgeon: JUANI Melendez III, MD;  Location: [facility] CATH LAB;  Service: Peripheral Vascular;  Laterality: Left;    TREATMENT OF CARDIAC ARRHYTHMIA N/A 3/28/2022    Procedure: CARDIOVERSION;  Surgeon: Migue Blunt MD;  Location: [facility] EP LAB;  Service: Cardiology;  Laterality: N/A;  AF, DCC, MAC, GP, 3 PREP*RODRIGO deferred pt 100% compliant*       Review of patient's allergies indicates:   Allergen Reactions    Ace inhibitors Swelling    Verapamil Other (See Comments)     Other reaction(s): Unknown    Povidone-iodine Itching       No current facility-administered medications on file prior to encounter.     Current Outpatient Medications on File Prior to Encounter   Medication Sig    acetaminophen (TYLENOL) 500 MG tablet Take 1 tablet (500 mg total) by mouth every 6 (six) hours as needed for Pain.    albuterol (VENTOLIN HFA) 90 mcg/actuation inhaler Inhale 2 puffs into the lungs every 6 (six) hours as needed for Wheezing or Shortness of Breath. Rescue    albuterol-ipratropium (DUO-NEB) 2.5 mg-0.5 mg/3 mL nebulizer solution Take 3 mLs by nebulization every 6 (six) hours as needed for Wheezing. Rescue. Can be used in place of albuterol inhaler.    amiodarone (PACERONE) 200 MG Tab Take  1 tablet (200 mg total) by mouth once daily.    apixaban (ELIQUIS) 5 mg Tab Take 1 tablet (5 mg total) by mouth 2 (two) times daily.    atorvastatin (LIPITOR) 40 MG tablet Take 2 tablets (80 mg total) by mouth once daily.    b complex vitamins (B COMPLEX-VITAMIN B12) tablet Take 1 tablet by mouth once daily.    calcium carbonate (CALCIUM 600 ORAL) Take 1 tablet by mouth 2 (two) times a day.    CHOLECALCIFEROL, VITAMIN D3, ORAL Take 2 tablets by mouth 2 (two) times daily.    clopidogreL (PLAVIX) 75 mg tablet Take 1 tablet (75 mg total) by mouth once daily.    doxazosin (CARDURA) 4 MG tablet Take 1 tablet (4 mg total) by mouth every 12 (twelve) hours.    famotidine (PEPCID) 20 MG tablet Take 1 tablet (20 mg total) by mouth 2 (two) times daily.    fexofenadine HCl (ALLEGRA ORAL) Take 1 tablet by mouth daily as needed (allergies).    fluticasone furoate-vilanteroL (BREO) 100-25 mcg/dose diskus inhaler Inhale 1 puff into the lungs once daily. Controller. Use this inhaler every day. (Patient not taking: Reported on 2/6/2024)    fluticasone propionate (FLONASE) 50 mcg/actuation nasal spray 1 spray (50 mcg total) by Each Nostril route daily as needed for Allergies.    FLUZONE HIGHDOSE QUAD 23-24  mcg/0.7 mL Syrg     furosemide (LASIX) 80 MG tablet Take 1 tablet (80 mg total) by mouth 2 (two) times a day.    gabapentin (NEURONTIN) 100 MG capsule Take 1 capsule (100 mg total) by mouth as needed (pain).    hydrALAZINE (APRESOLINE) 50 MG tablet TAKE 1 TABLET (50 MG TOTAL) BY MOUTH 3 (THREE) TIMES DAILY.    HYDROcodone-acetaminophen (NORCO) 5-325 mg per tablet Take 1 tablet by mouth every 6 (six) hours as needed for Pain.    isosorbide mononitrate (IMDUR) 30 MG 24 hr tablet Take 4 tablets (120 mg total) by mouth once daily.    metoprolol succinate (TOPROL-XL) 25 MG 24 hr tablet Take 0.5 tablets (12.5 mg total) by mouth once daily.    MITIGARE 0.6 mg Cap TAKE 1 CAPSULE BY MOUTH TWICE A DAY AS NEEDED GOUT FLARE UP TO 3 DAYS     multivitamin (THERAGRAN) per tablet Take 1 tablet by mouth Daily.    NIFEdipine (PROCARDIA-XL) 60 MG (OSM) 24 hr tablet Take 1 tablet (60 mg total) by mouth 2 (two) times a day.    nitroGLYCERIN (NITROSTAT) 0.4 MG SL tablet Place 1 tablet (0.4 mg total) under the tongue every 5 (five) minutes as needed for Chest pain. (Patient not taking: Reported on 2/6/2024)    omeprazole (PRILOSEC) 20 MG capsule Take 1 capsule (20 mg total) by mouth daily as needed (heartburn).    paricalcitoL (ZEMPLAR) 1 MCG capsule TAKE 1 CAPSULE ON MONDAY, WEDNESDAY AND FRIDAY    [email or web address] [facility],12Y UP,,PF, 30 mcg/0.3 mL injection Inject into the muscle.    potassium chloride (KLOR-CON) 10 MEQ TbSR Take 1 tablet (10 mEq total) by mouth daily as needed (to be taken with lasix).,    predniSONE (DELTASONE) 5 MG tablet Take 1 tablet (5 mg total) by mouth once daily.    pulse oximeter (PULSE OXIMETER) device by Apply Externally route 2 (two) times a day. Use twice daily at 8 AM and 3 PM and record the value in eMeterhart as directed.    sevelamer carbonate (RENVELA) 800 mg Tab Take 1 tablet (800 mg total) by mouth 3 (three) times daily with meals.    sodium polystyrene (KAYEXALATE) powder Take 60 mLs (15 g total) by mouth once daily.  (Using the teaspoon provided, scoop 4 level teaspoonsfuls, add 60 ml of water to powder.  Shake well and drink immediately.)    SPIKEVAX [id number],12Y UP,,PF, 50 mcg/0.5 mL injection     tacrolimus (PROGRAF) 1 MG Cap TAKE 3 CAPSULES (3 MG TOTAL) BY MOUTH EVERY 12 (TWELVE) HOURS.    tiotropium bromide (SPIRIVA RESPIMAT) 1.25 mcg/actuation inhaler Inhale 2 puffs into the lungs once daily. Controller. Use this inhaler every day.    traZODone (DESYREL) 50 MG tablet Take 1 tablet (50 mg total) by mouth nightly as needed for Insomnia.     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     Patient is currently retired   Substance and Sexual Activity    Alcohol use: No    Drug use: No    Sexual activity: Yes     Partners: Female     Review of Systems   Constitutional: Negative for fever and malaise/fatigue.   Eyes:  Negative for blurred vision and pain.   Cardiovascular:  Negative for chest pain, dyspnea on exertion, leg swelling, orthopnea, palpitations and paroxysmal nocturnal dyspnea.   Respiratory:  Negative for cough, shortness of breath, sputum production and wheezing.    Hematologic/Lymphatic: Negative for adenopathy and bleeding problem.   Skin:  Negative for rash.   Musculoskeletal:  Negative for back pain and neck pain.   Gastrointestinal:  Negative for abdominal pain, constipation, diarrhea, nausea and vomiting.   Genitourinary:  Negative for dysuria.   Neurological:  Negative for dizziness, headaches, light-headedness and weakness.     Objective:     Vital Signs (Most Recent):  Temp: 97.9 °F (36.6 °C) (24 0647)  Pulse: 98 (24 1003)  Resp: 19 (24 1003)  BP: (!) 157/77 (24 1003)  SpO2: 97 % (24 1003) Vital Signs (24h Range):  Temp:  [97.9 °F (36.6 °C)-98.8 °F (37.1 °C)] 97.9 °F (36.6 °C)  Pulse:  [] 98  Resp:  [14-24] 19  SpO2:  [97 %-100 %] 97 %  BP: (157-181)/(72-83) 157/77     Weight: 88.3 kg (194 lb 10.7 oz)  Body mass index is 25.68 kg/m².    SpO2: 97 %       No intake or output data in the 24 hours ending 24 1150    Lines/Drains/Airways       Peripheral Intravenous Line  Duration                  Hemodialysis AV  Fistula 02/19/24 0924 Left upper arm 4 days         Peripheral IV - Single Lumen 02/23/24 0559 18 G Posterior;Right Hand <1 day         Peripheral IV - Single Lumen 02/23/24 0802 20 G Anterior;Right Forearm <1 day                     Physical Exam  Constitutional:       General: He is not in acute distress.     Appearance: Normal appearance. He is not ill-appearing, toxic-appearing or diaphoretic.   HENT:      Head: Normocephalic and atraumatic.      Nose: Nose normal.   Eyes:      Extraocular Movements: Extraocular movements intact.      Pupils: Pupils are equal, round, and reactive to light.   Cardiovascular:      Rate and Rhythm: Normal rate and regular rhythm.      Heart sounds: No murmur heard.     No friction rub. No gallop.      Comments: L AV fistula   Pulmonary:      Effort: Pulmonary effort is normal. No respiratory distress.      Breath sounds: Normal breath sounds. No wheezing or rales.   Abdominal:      General: Abdomen is flat. There is no distension.      Palpations: Abdomen is soft. There is no mass.      Tenderness: There is no abdominal tenderness.   Musculoskeletal:         General: No swelling. Normal range of motion.      Cervical back: Normal range of motion. No rigidity.      Right lower leg: No edema.      Left lower leg: No edema.   Skin:     General: Skin is warm and dry.      Coloration: Skin is not jaundiced.      Findings: No bruising.   Neurological:      General: No focal deficit present.      Mental Status: He is alert and oriented to person, place, and time.      Cranial Nerves: No cranial nerve deficit.      Motor: No weakness.          Significant Labs: BMP:   Recent Labs   Lab 02/23/24  0609   GLU 93      K 2.9*   *   CO2 17*   BUN 65*   CREATININE 7.8*   CALCIUM 8.7    and CBC   Recent Labs   Lab 02/23/24  0609 02/23/24  0715   WBC 8.26 8.75   HGB 8.0* 7.7*   HCT 25.7* 23.5*   * 132*       Significant Imaging: Reviewed

## 2024-02-23 NOTE — HPI
68 year old male with ESRD s/p kidney transplant x2 (1992, 2005) on chronic immunosuppressive medications Prograf 3mg BID and Prednisone 5mg QD, CAD s/p PCI (2006), HTN, HLD, Chronic diastolic CHF, tachycardia-bradycardia syndrome with periods of pAF admitted to vascular surgery.     C/C; Pt presented with crushing chest pain. Trop elevated to .08. ECG with ST depressions in inferior leads and dynamic changes. Bedside ECHO without WMA. Discussed case with IC staff. Initial plan for LHC although chest pain improved after 3 nitro and morphine.     Last admission; Presented for outpatient fistulogram (recently had the fistula created on 12/18/23) with plans to start HD however had a period of unresponsiveness after the procedure. Pt was drowsy but arousable and able to answer questions after the episode. Wife reports he's had episodes similar to this for about 1.5 years however he has never been evaluated for them. Wife denies associated urinary or fecal incontinence, tongue biting, or whole body shaking associated with these episodes. Pt has an essential tremor and always has BL arm/hand shaking which is at it's baseline per Pt and wife. No personal or FHx seizure. Does not measure his BP or HR at home or during these episodes of unresponsiveness.      KTM consulted for immunosuppressive medication management and need for dialysis.

## 2024-02-23 NOTE — ED PROVIDER NOTES
Encounter Date: 2/23/2024       History     Chief Complaint   Patient presents with    Shortness of Breath     Arrives on CPAP. 88% on EMS arrival. Now satting 100% on CPAP. Hx CHF.     Ibrahima Phelps is a 68 y.o. male with a PMH of paroxysmal a fib, CHF, HLD, HTN, hypothyroidism, COPD, ESRD s/p kidney transplant, CKD III in transplanted kidney on immunosuppressive medications with plans to restart dialysis soon (has LUE AV fistula with recent admission for stenting) presenting to [facility] via EMS on CPAP for evaluation of dyspnea. Per EMS patient was 88% on RA, now 100% on CPAP.     Patient states he began feeling short of breath yesterday but it became severe this morning at 3 AM waking him from sleep. Did duoneb breathing treatment at home without relief. Reports having crushing midsternal chest pain that has improved on arrival to ED after sublingual NTG x 1 at home, nitro spray and nitro paste applied by EMS. He still reports 5/10 chest pain at present, mid sternal and nonradiating.     Reports cough over last few days productive of white sputum, no hemoptysis. Denies fever, chills, N/V/D, abdominal pain, dysuria, or edema.          The history is provided by the patient, medical records and the EMS personnel. No  was used.     Review of patient's allergies indicates:   Allergen Reactions    Ace inhibitors Swelling    Verapamil Other (See Comments)     Other reaction(s): Unknown    Povidone-iodine Itching     Past Medical History:   Diagnosis Date    Atrial fibrillation     CAD (coronary artery disease) 2006    MI in 2006    CHF (congestive heart failure) 2017    CKD (chronic kidney disease) stage 3, GFR 30-59 ml/min     Dr. Latif.  in transplanted kidney    CKD (chronic kidney disease) stage 5, GFR less than 15 ml/min 02/02/2024    COVID-19 02/07/2023    Diverticulosis     Hyperlipidemia     Hypertension     Keloid cicatrix     Metabolic bone disease     Other emphysema  "10/01/2019    Pericarditis     S/P kidney transplant 1992    x2 (1992 & 2005),    Thrombocytopenia     Thyroid disease     Tobacco use 09/05/2014     Past Surgical History:   Procedure Laterality Date    AV FISTULA PLACEMENT Left 12/18/2023    Procedure: CREATION, AV FISTULA;  Surgeon: JUANI Melendez III, MD;  Location: [facility] OR [facility] FLR;  Service: Vascular;  Laterality: Left;  LUE AVF creation vs AVG insertion    CARDIOVERSION  04/30/15    CHOLECYSTECTOMY      COLONOSCOPY  April 20, 2011    Diverticulosis, repeat recommended in 3 yrs., repeat colonoscopy 2014 revealed 2 polyps.  He should return in 5 years.    COLONOSCOPY N/A 3/13/2020    Procedure: COLONOSCOPY;  Surgeon: Chon Casper MD;  Location: [facility] (4TH FLR);  Service: Endoscopy;  Laterality: N/A;  ok to hold coumadin x5days-see telephone encounter 2/4/20-tb    COLONOSCOPY N/A 2/4/2021    Procedure: COLONOSCOPY;  Surgeon: Chon Casper MD;  Location: [facility] (4TH FLR);  Service: Endoscopy;  Laterality: N/A;  Eliquis - per Dr. GAVIN Blunt ok to hold x 2 days and "restarted asap"- ERW  last prep "poor", [id number] ordered/ Pt requests Dr. Casper  prep ins. mailed - COVID screening on 2/1/21 PCW- ERW    COLONOSCOPY N/A 3/3/2021    Procedure: COLONOSCOPY;  Surgeon: Chon Casper MD;  Location: [facility] (4TH FLR);  Service: Endoscopy;  Laterality: N/A;  Patient with his second poor bowel pre and has poor prep today.  If patient not intersted or can't get colonoscopy tomorrow will need constipation bowel prep and will need to restart his Eliquis today.Thanks,Chon Blunt-ok to hold Eliquis 2 days prior      COVID     CORONARY ANGIOPLASTY WITH STENT PLACEMENT  9/13/2006    FISTULOGRAM N/A 2/19/2024    Procedure: Fistulogram;  Surgeon: JUANI Melendez III, MD;  Location: [facility] CATH LAB;  Service: Peripheral Vascular;  Laterality: N/A;    IRRIGATION AND DEBRIDEMENT Right 8/30/2023    Procedure: IRRIGATION AND DEBRIDEMENT, RIGHT MIDDLE FINGER; "  Surgeon: Martin Larsen MD;  Location: [facility] OR [facility] FLR;  Service: Orthopedics;  Laterality: Right;    KIDNEY TRANSPLANT      PARATHYROIDECTOMY      PERCUTANEOUS TRANSLUMINAL ANGIOPLASTY OF ARTERIOVENOUS FISTULA Left 2024    Procedure: PTA, AV FISTULA;  Surgeon: JUANI Melendez III, MD;  Location: [facility] CATH LAB;  Service: Peripheral Vascular;  Laterality: Left;    TREATMENT OF CARDIAC ARRHYTHMIA N/A 3/28/2022    Procedure: CARDIOVERSION;  Surgeon: Migue Blunt MD;  Location: [facility] EP LAB;  Service: Cardiology;  Laterality: N/A;  AF, DCC, MAC, GP, 3 PREP*RODRIGO deferred pt 100% compliant*     Family History   Problem Relation Age of Onset    No Known Problems Sister     No Known Problems Brother     Thyroid disease Mother         s/p surgery    Heart disease Father         had pacemaker    No Known Problems Sister     Kidney failure Sister     Kidney disease Sister     No Known Problems Sister     Kidney disease Brother     Kidney failure Brother         s/p transplant    Diabetes Mellitus Neg Hx     Stroke Neg Hx     Heart attack Neg Hx     Cancer Neg Hx     Celiac disease Neg Hx     Cirrhosis Neg Hx     Colon cancer Neg Hx     Esophageal cancer Neg Hx     Inflammatory bowel disease Neg Hx     Rectal cancer Neg Hx     Stomach cancer Neg Hx     Ulcerative colitis Neg Hx     Liver disease Neg Hx     Liver cancer Neg Hx     Crohn's disease Neg Hx     Melanoma Neg Hx      Social History     Tobacco Use    Smoking status: Every Day     Current packs/day: 0.00     Average packs/day: 0.5 packs/day for 40.0 years (20.0 ttl pk-yrs)     Types: Cigarettes     Start date: 1982     Last attempt to quit: 2022     Years since quittin.9    Smokeless tobacco: Never    Tobacco comments:     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     Patient is currently retired   Substance Use Topics    Alcohol use: No    Drug use: No     Review of Systems    Physical Exam      Initial Vitals   BP Pulse Resp Temp SpO2   02/23/24 0556 02/23/24 0556 02/23/24 0556 02/23/24 0558 02/23/24 0556   (!) 157/83 110 (!) 24 98.8 °F (37.1 °C) 100 %      MAP       --                Physical Exam    Nursing note and vitals reviewed.  Constitutional: He appears well-developed and well-nourished. He appears distressed.   HENT:   Head: Normocephalic.   Mouth/Throat: Oropharynx is clear and moist.   Eyes: Pupils are equal, round, and reactive to light. No scleral icterus.   Neck: Neck supple.   Cardiovascular:  An irregularly irregular rhythm present.   Tachycardia present.         Pulmonary/Chest: No stridor. He is in respiratory distress.   On CPAP with Spo2 100%   Moderately increased work of breathing, supraclavicular retractions  Mild diffuse wheezes and bibasilar crackles, tachypneic      Abdominal: Abdomen is soft. He exhibits no distension. There is no abdominal tenderness.   Musculoskeletal:         General: No edema.      Cervical back: Neck supple.      Comments: LUE AV fistula with palpable thrill      Neurological: He is alert. GCS score is 15. GCS eye subscore is 4. GCS verbal subscore is 5. GCS motor subscore is 6.   Skin: Skin is warm and dry. Capillary refill takes less than 2 seconds.         ED Course   Procedures  Labs Reviewed   CBC W/ AUTO DIFFERENTIAL - Abnormal; Notable for the following components:       Result Value    RBC 3.13 (*)     Hemoglobin 8.0 (*)     Hematocrit 25.7 (*)     MCH 25.6 (*)     MCHC 31.1 (*)     RDW 17.9 (*)     Platelets 121 (*)     Gran % 73.5 (*)     Lymph % 15.6 (*)     All other components within normal limits   COMPREHENSIVE METABOLIC PANEL - Abnormal; Notable for the following components:    Potassium 2.9 (*)     Chloride 111 (*)     CO2 17 (*)     BUN 65 (*)     Creatinine 7.8 (*)     Albumin 3.1 (*)     AST 7 (*)     eGFR 6.9 (*)     All other components within normal limits   TROPONIN I - Abnormal; Notable for the following components:    Troponin  I 0.080 (*)     All other components within normal limits   B-TYPE NATRIURETIC PEPTIDE - Abnormal; Notable for the following components:    BNP 1,929 (*)     All other components within normal limits   PROTIME-INR - Abnormal; Notable for the following components:    Prothrombin Time 14.8 (*)     INR 1.4 (*)     All other components within normal limits    Narrative:     Draw baseline aPTT prior to starting the heparin bolus or  infusion  (if patient is on warfarin prior to heparin therapy)   CBC W/ AUTO DIFFERENTIAL - Abnormal; Notable for the following components:    RBC 2.94 (*)     Hemoglobin 7.7 (*)     Hematocrit 23.5 (*)     MCV 80 (*)     MCH 26.2 (*)     RDW 17.4 (*)     Platelets 132 (*)     Lymph % 17.4 (*)     All other components within normal limits    Narrative:     Draw baseline aPTT prior to starting the heparin bolus or  infusion  (if patient is on warfarin prior to heparin therapy)   ISTAT PROCEDURE - Abnormal; Notable for the following components:    POC PCO2 28.7 (*)     POC PO2 38 (*)     POC HCO3 18.3 (*)     POC BE -6 (*)     POC TCO2 19 (*)     All other components within normal limits   INFLUENZA A & B BY MOLECULAR   SARS-COV-2 RNA AMPLIFICATION, QUAL   LACTIC ACID, PLASMA   APTT    Narrative:     Draw baseline aPTT prior to starting the heparin bolus or  infusion  (if patient is on warfarin prior to heparin therapy)   ISTAT LACTATE   POCT GLUCOSE MONITORING CONTINUOUS     EKG Readings: (Independently Interpreted)   Initial Reading: No STEMI. Previous EKG: Compared with most recent EKG   EKG 06:07 AM   a fib rate 68, ST depression in lateral leads        Other EKG Interpretations: 6:56 AM   a fib with RVR, rate 105, worsening ST depression in lateral leads, ST depression in inferior leads   incomplete LBBB   Compared with prior EKG.  No STEMI        ECG Results              EKG 12-lead (Final result)        Collection Time Result Time QRS Duration [facility] QTC Calculation    02/23/24 08:51:21  02/23/24 10:00:27 112 528                     Final result by Interface, Lab In [facility] (02/23/24 10:00:33)                   Narrative:    Test Reason : I25.10,    Vent. Rate : 089 BPM     Atrial Rate : 063 BPM     P-R Int : 000 ms          QRS Dur : 112 ms      QT Int : 434 ms       P-R-T Axes : 000 047 232 degrees     QTc Int : 528 ms    Atrial fibrillation  Incomplete left bundle branch block  Minimal voltage criteria for LVH, may be normal variant  Marked ST abnormality, possible inferior subendocardial injury  Marked ST abnormality, possible anterolateral subendocardial injury  Prolonged QT  Abnormal ECG  When compared with ECG of 23-FEB-2024 06:58,  ST less depressed in Inferior leads  ST less depressed in Lateral leads  T wave inversion no longer evident in Lateral leads  Confirmed by Luke EDGE MD ([id number]) on 2/23/2024 10:00:24 AM    Referred By: [email or web address]   SELF           Confirmed By:Luke EDGE MD                                     EKG 12-lead (Final result)        Collection Time Result Time QRS Duration [facility] QTC Calculation    02/23/24 06:56:04 02/23/24 09:52:32 112 470                     Final result by Interface, Lab In [facility] (02/23/24 09:52:34)                   Narrative:    Test Reason : R06.02,    Vent. Rate : 105 BPM     Atrial Rate : 138 BPM     P-R Int : 000 ms          QRS Dur : 112 ms      QT Int : 356 ms       P-R-T Axes : 000 069 201 degrees     QTc Int : 470 ms    Atrial fibrillation with rapid ventricular response  Incomplete left bundle branch block  Marked ST abnormality, possible inferior subendocardial injury  Marked ST abnormality, possible anterolateral subendocardial injury  Abnormal ECG  When compared with ECG of 23-FEB-2024 06:07,  Significant changes have occurred  Confirmed by Luke EDGE MD ([id number]) on 2/23/2024 9:52:30 AM    Referred By: [email or web address]   SELF           Confirmed By:Luke EDGE MD                                  Imaging Results              X-Ray Chest 1 View (Final  result)  Result time 02/23/24 06:15:23      Final result by Otto Valdivia MD (02/23/24 06:15:23)                   Impression:      As above.      Electronically signed by: Otto Valdivia  Date:    02/23/2024  Time:    06:15               Narrative:    EXAMINATION:  XR CHEST 1 VIEW    CLINICAL HISTORY:  shortness of breath;    TECHNIQUE:  Single frontal view of the chest was performed.    COMPARISON:  Chest radiograph 02/19/2024, 13:35 hours    FINDINGS:  Monitoring leads overlie the chest.    Grossly unchanged cardiomediastinal contours.    Patchy opacities in both lungs are noted, slightly progressed in the right mid lower lung zone when compared to 02/19/2024, 13:35 hours, the appearance of which may be related to slight increased size of a small layering right pleural effusion.    No definite pneumothorax.    Remainder examination not substantially changed.                                    X-Rays:   Independently Interpreted Readings:   Chest X-Ray: Patchy opacities in both lungs, small right pleural effusion           Medications   heparin 25,000 units in dextrose 5% 250 mL (100 units/mL) infusion LOW INTENSITY nomogram - [facility] (12 Units/kg/hr × 83.3 kg (Adjusted) Intravenous Handoff 2/23/24 1901)   heparin 25,000 units in dextrose 5% (100 units/ml) IV bolus from bag LOW INTENSITY nomogram - [facility] (has no administration in time range)   heparin 25,000 units in dextrose 5% (100 units/ml) IV bolus from bag LOW INTENSITY nomogram - [facility] (has no administration in time range)   nitroGLYCERIN SL tablet 0.4 mg (0.4 mg Sublingual Given 2/23/24 0724)   sodium chloride 0.9% flush 10 mL (has no administration in time range)   aspirin EC tablet 81 mg (has no administration in time range)   albuterol-ipratropium 2.5 mg-0.5 mg/3 mL nebulizer solution 3 mL (has no administration in time range)   amiodarone tablet 200 mg (200 mg Oral Given 2/23/24 1349)   atorvastatin tablet 80 mg (80 mg Oral Given 2/23/24 1349)    famotidine tablet 20 mg (20 mg Oral Given 2/23/24 1350)   HYDROcodone-acetaminophen 5-325 mg per tablet 1 tablet (has no administration in time range)   metoprolol succinate (TOPROL-XL) 24 hr split tablet 12.5 mg (12.5 mg Oral Given 2/23/24 1349)   NIFEdipine 24 hr tablet 60 mg (60 mg Oral Given 2/23/24 2057)   multivitamin tablet (1 tablet Oral Given 2/23/24 1349)   sevelamer carbonate tablet 800 mg (800 mg Oral Given 2/23/24 1646)   tiotropium bromide 1.25 mcg/actuation inhaler 2 puff (2 puffs Inhalation Given 2/23/24 1814)   tacrolimus capsule 3 mg (has no administration in time range)   predniSONE tablet 5 mg (5 mg Oral Given 2/23/24 1349)   mupirocin 2 % ointment ( Nasal Given 2/23/24 2058)   0.9%  NaCl infusion (has no administration in time range)   0.9%  NaCl infusion ( Intravenous Canceled Entry 2/23/24 1515)   sodium chloride 0.9% bolus 250 mL 250 mL (has no administration in time range)   gabapentin capsule 100 mg (has no administration in time range)   doxazosin tablet 4 mg (4 mg Oral Given 2/23/24 1707)   hydrALAZINE tablet 50 mg (50 mg Oral Given 2/23/24 2100)   albuterol-ipratropium 2.5 mg-0.5 mg/3 mL nebulizer solution 3 mL (3 mLs Nebulization Given 2/23/24 0625)   methylPREDNISolone sodium succinate injection 125 mg (125 mg Intravenous Given 2/23/24 0628)   aspirin tablet 325 mg (325 mg Oral Given 2/23/24 0706)   potassium chloride SA CR tablet 60 mEq (60 mEq Oral Given 2/23/24 0739)   heparin 25,000 units in dextrose 5% (100 units/ml) IV bolus from bag LOW INTENSITY nomogram - [facility] (3,990 Units Intravenous Bolus from Bag 2/23/24 0811)   ondansetron injection 4 mg (4 mg Intravenous Given 2/23/24 0710)   morphine injection 4 mg (4 mg Intravenous Given 2/23/24 0750)   morphine injection 4 mg (4 mg Intravenous Given 2/23/24 1002)   morphine injection 1 mg ( Intravenous Canceled Entry 2/23/24 1945)     Medical Decision Making  67 y/o M arrives via EMS on CPAP with dyspnea and substernal chest pain,  mild relief with NTG PTA.     On arrival, patient is afebrile, HD stable, with tachycardia, tachypnea, wheezes and crackles noted on exam. No significant peripheral edema.     Duonebs x 3 and solumedrol given for wheezes, concern for COPD exacerbation. Wheezing resolved on reexamination.   Patient still having chest pain. Given morphine and NTG x1 (3rd dose with PTA meds) with resolution of chest pain.   Kcl given for hypokalemia 2.9     VBG with pH 7.4, CO2 28.7, HCO3 18.3  No need for bipap, transition to nasal cannula     Cr 7.8 similar to prior. Lactate 1.71  Trop .08, has been higher previously. BNP 1929.   Patchy opacities in both lungs, small right pleural effusion on CXR.     After initial resolution of pain following third NTG and morphine, patient reported return of crushing sternal chest pain. Repeat EKG with worsening inferolateral ST depressions. Morphine given again. ACS protocol with ASA, heparin gtt, continuation of home plavix. Consulted and discussed with cardiology who evaluated the patient at bedside. They will admit the patient to CICU for possible LHC and further evaluation and management of NSTEMI and CHF exacerbation.     Amount and/or Complexity of Data Reviewed  Labs: ordered. Decision-making details documented in ED Course.  Radiology: ordered and independent interpretation performed. Decision-making details documented in ED Course.  ECG/medicine tests: ordered and independent interpretation performed. Decision-making details documented in ED Course.    Risk  OTC drugs.  Prescription drug management.  Decision regarding hospitalization.                                      Clinical Impression:  Final diagnoses:  [R06.02] Shortness of breath  [R07.9] Chest pain, unspecified type (Primary)  [R79.89] Elevated troponin  [E87.6] Hypokalemia  [I21.4] NSTEMI (non-ST elevated myocardial infarction)  [I50.9] Acute on chronic congestive heart failure, unspecified heart failure type          ED  Disposition Condition    Admit Stable                Zunilda Barriga MD  Resident  02/24/24 0059

## 2024-02-23 NOTE — ED NOTES
Patient reports decrease in pain score to 4/10 right anterior thoracic chest pain following 3rd SLG NTG tablet.

## 2024-02-23 NOTE — H&P
[facility] - Cardiology Stepdown  Cardiology  History and Physical     Patient Name: Ibrahima Phelps  MRN: [id number]  Admission Date: 2/23/2024  Code Status: Full Code   Attending Provider: Tim Villanueva MD   Primary Care Physician: Anatoliy Colindres MD  Principal Problem:<principal problem not specified>    Patient information was obtained from patient and ER records.     Subjective:       HPI:  Mr. Phelps is a a 67 yo male with a PMHx of paroxysmal atrial fibrillation, tachy-loan syndrome, HFpEF with most recent LVEF 65%, CAD s/p PCI 2006, HTN, HLD, aortic atherosclerosis, secondary hyperparathyroidism, diverticulosis, COPD, GERD, hypothyroidism, gout, a history of tobacco use, obesity, ESRD s/p kidney transplant x2 (1992, 2005) on chronic immunosuppressive medication who presents with chest pain.      Recent admission and discharge 2/21/24. Syncope after vascular procedure. No syncopal episodes or events on telemetry were noted after admission. IV Lasix was given noting BNP > 2000 and overload on exam.     In the ED his HR was in 60-70s, -180s. Plts 121. K 2.9, Cr 7.8. Trop .08. BNP 1929. LA 1.71. CXr with pulmonary edema and pleural effusion on the right. ECF with AF with RVR with ST depressions in inferior leads and non-specific changes. Pt complaining of crushing chest pain which started this AM around 3 and continued until he got here. PT given Nitro in ED x3 and morphine twice. Pt noted chest pain is improved on my evaluation and he is chest pain free.      9/13/23 PET     There is a small sized, mild intensity apical inferior and inferoseptal reversible perfusion abnormality involving 6% of LV myocardium indicative of focal coronary stenosis.    Within the perfusion abnormality, absolute myocardial perfusion (cc/min/gm) averaged 0.56 cc/min/g at rest, 0.71 cc/min/g at stress and CFR was 1.27 , which equates to severely reduced coronary flow capacity within the LAD territory.    There are no other  significant perfusion abnormalities.    The whole heart absolute myocardial perfusion values averaged 0.74 cc/min/g at rest, which is normal; 1.17 cc/min/g at stress, which is mildly reduced; and CFR is 1.59 , which is mildly reduced.    CT attenuation images demonstrate severe diffuse coronary calcifications in the LAD, and moderate diffuse coronary calcifications in the LCX and RCA territory and mild diffuse aortic calcifications in the descending aorta.    The gated perfusion images showed an ejection fraction of 53% at rest and 56% during stress. A normal ejection fraction is greater than 47%.    The wall motion is normal at rest and during stress.    The LV cavity size is mildly enlarged at rest and stress.    The ECG portion of the study is abnormal but not diagnostic due to resting ST-T abnormalities.    There were no arrhythmias during stress.    The patient reported chest pain during the stress test.    There are no prior studies for comparison.     10/15/23 ECHO    Left Ventricle: The left ventricle is normal in size. Ventricular mass is normal. Normal wall thickness. Normal wall motion. There is normal systolic function with a visually estimated ejection fraction of 60 - 65%. Grade II diastolic dysfunction.    Left Atrium: Left atrium is severely dilated.    Right Ventricle: Normal right ventricular cavity size. Wall thickness is normal. Right ventricle wall motion  is normal. Systolic function is normal.    Right Atrium: Right atrium is dilated.    Aortic Valve: Moderately calcified cusps. There is moderate annular calcification present. Mildly restricted motion. There is mild stenosis. Aortic valve area by VTI is 1.85 cm². Aortic valve peak velocity is 2.12 m/s. Mean gradient is 9 mmHg. The dimensionless index is 0.49. There is mild to moderate aortic regurgitation.    Pulmonary Artery: The estimated pulmonary artery systolic pressure is 59 mmHg.    IVC/SVC: Normal venous pressure at 3 mmHg.        Past  "Medical History:   Diagnosis Date    Atrial fibrillation     CAD (coronary artery disease) 2006    MI in 2006    CHF (congestive heart failure) 2017    CKD (chronic kidney disease) stage 3, GFR 30-59 ml/min     Dr. Latif.  in transplanted kidney    CKD (chronic kidney disease) stage 5, GFR less than 15 ml/min 02/02/2024    COVID-19 02/07/2023    Diverticulosis     Hyperlipidemia     Hypertension     Keloid cicatrix     Metabolic bone disease     Other emphysema 10/01/2019    Pericarditis     S/P kidney transplant 1992    x2 (1992 & 2005),    Thrombocytopenia     Thyroid disease     Tobacco use 09/05/2014       Past Surgical History:   Procedure Laterality Date    AV FISTULA PLACEMENT Left 12/18/2023    Procedure: CREATION, AV FISTULA;  Surgeon: JUANI Melendez III, MD;  [facility];  Service: Vascular;  Laterality: Left;  LUE AVF creation vs AVG insertion    CARDIOVERSION  04/30/15    CHOLECYSTECTOMY      COLONOSCOPY  April 20, 2011    Diverticulosis, repeat recommended in 3 yrs., repeat colonoscopy 2014 revealed 2 polyps.  He should return in 5 years.    COLONOSCOPY N/A 3/13/2020    Procedure: COLONOSCOPY;  Surgeon: Chon Casper MD;  Location: [facility] (4TH FLR);  Service: Endoscopy;  Laterality: N/A;  ok to hold coumadin x5days-see telephone encounter 2/4/20-tb    COLONOSCOPY N/A 2/4/2021    Procedure: COLONOSCOPY;  Surgeon: Chon Casper MD;  Location: [facility] (4TH FLR);  Service: Endoscopy;  Laterality: N/A;  Eliquis - per Dr. GAVIN Blunt ok to hold x 2 days and "restarted asap"- ERW  last prep "poor", [id number] ordered/ Pt requests Dr. Casper  prep ins. mailed - COVID screening on 2/1/21 PCW- ERW    COLONOSCOPY N/A 3/3/2021    Procedure: COLONOSCOPY;  Surgeon: Chon Casper MD;  Location: [facility] (4TH FLR);  Service: Endoscopy;  Laterality: N/A;  Patient with his second poor bowel pre and has poor prep today.  If patient not intersted or can't get colonoscopy tomorrow will need " constipation bowel prep and will need to restart his Eliquis today.Thanks,Chon     per Dr Lopez to hold Eliquis 2 days prior      COVID     CORONARY ANGIOPLASTY WITH STENT PLACEMENT  9/13/2006    FISTULOGRAM N/A 2/19/2024    Procedure: Fistulogram;  Surgeon: JUANI Melendez III, MD;  Location: [facility] CATH LAB;  Service: Peripheral Vascular;  Laterality: N/A;    IRRIGATION AND DEBRIDEMENT Right 8/30/2023    Procedure: IRRIGATION AND DEBRIDEMENT, RIGHT MIDDLE FINGER;  Surgeon: Martin Larsen MD;  Location: [facility] OR [facility] FLR;  Service: Orthopedics;  Laterality: Right;    KIDNEY TRANSPLANT  2005    PARATHYROIDECTOMY      PERCUTANEOUS TRANSLUMINAL ANGIOPLASTY OF ARTERIOVENOUS FISTULA Left 2/19/2024    Procedure: PTA, AV FISTULA;  Surgeon: JUANI Melendez III, MD;  Location: [facility] CATH LAB;  Service: Peripheral Vascular;  Laterality: Left;    TREATMENT OF CARDIAC ARRHYTHMIA N/A 3/28/2022    Procedure: CARDIOVERSION;  Surgeon: Migue Blunt MD;  Location: [facility] EP LAB;  Service: Cardiology;  Laterality: N/A;  AF, DCC, MAC, GP, 3 PREP*RODRIGO deferred pt 100% compliant*       Review of patient's allergies indicates:   Allergen Reactions    Ace inhibitors Swelling    Verapamil Other (See Comments)     Other reaction(s): Unknown    Povidone-iodine Itching       No current facility-administered medications on file prior to encounter.     Current Outpatient Medications on File Prior to Encounter   Medication Sig    acetaminophen (TYLENOL) 500 MG tablet Take 1 tablet (500 mg total) by mouth every 6 (six) hours as needed for Pain.    albuterol (VENTOLIN HFA) 90 mcg/actuation inhaler Inhale 2 puffs into the lungs every 6 (six) hours as needed for Wheezing or Shortness of Breath. Rescue    albuterol-ipratropium (DUO-NEB) 2.5 mg-0.5 mg/3 mL nebulizer solution Take 3 mLs by nebulization every 6 (six) hours as needed for Wheezing. Rescue. Can be used in place of albuterol inhaler.    amiodarone (PACERONE) 200 MG Tab Take 1  tablet (200 mg total) by mouth once daily.    apixaban (ELIQUIS) 5 mg Tab Take 1 tablet (5 mg total) by mouth 2 (two) times daily.    atorvastatin (LIPITOR) 40 MG tablet Take 2 tablets (80 mg total) by mouth once daily.    b complex vitamins (B COMPLEX-VITAMIN B12) tablet Take 1 tablet by mouth once daily.    calcium carbonate (CALCIUM 600 ORAL) Take 1 tablet by mouth 2 (two) times a day.    CHOLECALCIFEROL, VITAMIN D3, ORAL Take 2 tablets by mouth 2 (two) times daily.    clopidogreL (PLAVIX) 75 mg tablet Take 1 tablet (75 mg total) by mouth once daily.    doxazosin (CARDURA) 4 MG tablet Take 1 tablet (4 mg total) by mouth every 12 (twelve) hours.    famotidine (PEPCID) 20 MG tablet Take 1 tablet (20 mg total) by mouth 2 (two) times daily.    fexofenadine HCl (ALLEGRA ORAL) Take 1 tablet by mouth daily as needed (allergies).    fluticasone furoate-vilanteroL (BREO) 100-25 mcg/dose diskus inhaler Inhale 1 puff into the lungs once daily. Controller. Use this inhaler every day. (Patient not taking: Reported on 2/6/2024)    fluticasone propionate (FLONASE) 50 mcg/actuation nasal spray 1 spray (50 mcg total) by Each Nostril route daily as needed for Allergies.    FLUZONE HIGHDOSE QUAD 23-24  mcg/0.7 mL Syrg     furosemide (LASIX) 80 MG tablet Take 1 tablet (80 mg total) by mouth 2 (two) times a day.    gabapentin (NEURONTIN) 100 MG capsule Take 1 capsule (100 mg total) by mouth as needed (pain).    hydrALAZINE (APRESOLINE) 50 MG tablet TAKE 1 TABLET (50 MG TOTAL) BY MOUTH 3 (THREE) TIMES DAILY.    HYDROcodone-acetaminophen (NORCO) 5-325 mg per tablet Take 1 tablet by mouth every 6 (six) hours as needed for Pain.    isosorbide mononitrate (IMDUR) 30 MG 24 hr tablet Take 4 tablets (120 mg total) by mouth once daily.    metoprolol succinate (TOPROL-XL) 25 MG 24 hr tablet Take 0.5 tablets (12.5 mg total) by mouth once daily.    MITIGARE 0.6 mg Cap TAKE 1 CAPSULE BY MOUTH TWICE A DAY AS NEEDED GOUT FLARE UP TO 3 DAYS     multivitamin (THERAGRAN) per tablet Take 1 tablet by mouth Daily.    NIFEdipine (PROCARDIA-XL) 60 MG (OSM) 24 hr tablet Take 1 tablet (60 mg total) by mouth 2 (two) times a day.    nitroGLYCERIN (NITROSTAT) 0.4 MG SL tablet Place 1 tablet (0.4 mg total) under the tongue every 5 (five) minutes as needed for Chest pain. (Patient not taking: Reported on 2/6/2024)    omeprazole (PRILOSEC) 20 MG capsule Take 1 capsule (20 mg total) by mouth daily as needed (heartburn).    paricalcitoL (ZEMPLAR) 1 MCG capsule TAKE 1 CAPSULE ON MONDAY, WEDNESDAY AND FRIDAY    [employer] [facility],12Y UP,,PF, 30 mcg/0.3 mL injection Inject into the muscle.    potassium chloride (KLOR-CON) 10 MEQ TbSR Take 1 tablet (10 mEq total) by mouth daily as needed (to be taken with lasix).,    predniSONE (DELTASONE) 5 MG tablet Take 1 tablet (5 mg total) by mouth once daily.    pulse oximeter (PULSE OXIMETER) device by Apply Externally route 2 (two) times a day. Use twice daily at 8 AM and 3 PM and record the value in [employer] as directed.    sevelamer carbonate (RENVELA) 800 mg Tab Take 1 tablet (800 mg total) by mouth 3 (three) times daily with meals.    sodium polystyrene (KAYEXALATE) powder Take 60 mLs (15 g total) by mouth once daily.  (Using the teaspoon provided, scoop 4 level teaspoonsfuls, add 60 ml of water to powder.  Shake well and drink immediately.)    SPIKEVAX [id number],12Y UP,,PF, 50 mcg/0.5 mL injection     tacrolimus (PROGRAF) 1 MG Cap TAKE 3 CAPSULES (3 MG TOTAL) BY MOUTH EVERY 12 (TWELVE) HOURS.    tiotropium bromide (SPIRIVA RESPIMAT) 1.25 mcg/actuation inhaler Inhale 2 puffs into the lungs once daily. Controller. Use this inhaler every day.    traZODone (DESYREL) 50 MG tablet Take 1 tablet (50 mg total) by mouth nightly as needed for Insomnia.     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     Patient is currently retired   Substance and Sexual Activity    Alcohol use: No    Drug use: No    Sexual activity: Yes     Partners: Female     Review of Systems   Constitutional: Negative for fever and malaise/fatigue.   Eyes:  Negative for blurred vision and pain.   Cardiovascular:  Negative for chest pain, dyspnea on exertion, leg swelling, orthopnea, palpitations and paroxysmal nocturnal dyspnea.   Respiratory:  Negative for cough, shortness of breath, sputum production and wheezing.    Hematologic/Lymphatic: Negative for adenopathy and bleeding problem.   Skin:  Negative for rash.   Musculoskeletal:  Negative for back pain and neck pain.   Gastrointestinal:  Negative for abdominal pain, constipation, diarrhea, nausea and vomiting.   Genitourinary:  Negative for dysuria.   Neurological:  Negative for dizziness, headaches, light-headedness and weakness.     Objective:     Vital Signs (Most Recent):  Temp: 97.9 °F (36.6 °C) (24 0647)  Pulse: 98 (24 1003)  Resp: 19 (24 1003)  BP: (!) 157/77 (24 1003)  SpO2: 97 % (24 1003) Vital Signs (24h Range):  Temp:  [97.9 °F (36.6 °C)-98.8 °F (37.1 °C)] 97.9 °F (36.6 °C)  Pulse:  [] 98  Resp:  [14-24] 19  SpO2:  [97 %-100 %] 97 %  BP: (157-181)/(72-83) 157/77     Weight: 88.3 kg (194 lb 10.7 oz)  Body mass index is 25.68 kg/m².    SpO2: 97 %       No intake or output data in the 24 hours ending 24 1150    Lines/Drains/Airways       Peripheral Intravenous Line  Duration                  Hemodialysis AV  Fistula 02/19/24 0924 Left upper arm 4 days         Peripheral IV - Single Lumen 02/23/24 0559 18 G Posterior;Right Hand <1 day         Peripheral IV - Single Lumen 02/23/24 0802 20 G Anterior;Right Forearm <1 day                     Physical Exam  Constitutional:       General: He is not in acute distress.     Appearance: Normal appearance. He is not ill-appearing, toxic-appearing or diaphoretic.   HENT:      Head: Normocephalic and atraumatic.      Nose: Nose normal.   Eyes:      Extraocular Movements: Extraocular movements intact.      Pupils: Pupils are equal, round, and reactive to light.   Cardiovascular:      Rate and Rhythm: Normal rate and regular rhythm.      Heart sounds: No murmur heard.     No friction rub. No gallop.      Comments: L AV fistula   Pulmonary:      Effort: Pulmonary effort is normal. No respiratory distress.      Breath sounds: Normal breath sounds. No wheezing or rales.   Abdominal:      General: Abdomen is flat. There is no distension.      Palpations: Abdomen is soft. There is no mass.      Tenderness: There is no abdominal tenderness.   Musculoskeletal:         General: No swelling. Normal range of motion.      Cervical back: Normal range of motion. No rigidity.      Right lower leg: No edema.      Left lower leg: No edema.   Skin:     General: Skin is warm and dry.      Coloration: Skin is not jaundiced.      Findings: No bruising.   Neurological:      General: No focal deficit present.      Mental Status: He is alert and oriented to person, place, and time.      Cranial Nerves: No cranial nerve deficit.      Motor: No weakness.          Significant Labs: BMP:   Recent Labs   Lab 02/23/24  0609   GLU 93      K 2.9*   *   CO2 17*   BUN 65*   CREATININE 7.8*   CALCIUM 8.7    and CBC   Recent Labs   Lab 02/23/24  0609 02/23/24  0715   WBC 8.26 8.75   HGB 8.0* 7.7*   HCT 25.7* 23.5*   * 132*       Significant Imaging: Reviewed   Assessment and Plan:     NSTEMI (non-ST  elevated myocardial infarction)  Pt presented with crushing chest pain. Trop elevated to .08. ECG with ST depressions in inferior leads and dynamic changes. Bedside ECHO without WMA. Discussed case with IC staff. Initial plan for LHC although chest pain improved after 3 nitro and morphine. Pt also noted to have worsening kidney failure  and will likely need HD soon    -Admit to CCU   -Treat with ACS protocol. ASA/Plavix, HI statin, BB and heparin gtt  -Continue to trend troponin level.   - Formal ECHO ordered   -Given advanced CKD, discussed risks and benefits of LHC. Pt chest pain free at this time. If pt develops unrelenting chest pain will consider nitro gtt and potential LHC     CKD (chronic kidney disease) stage 5, GFR less than 15 ml/min  BMP reviewed- noted Estimated Creatinine Clearance: 10.1 mL/min (A) (based on SCr of 7.8 mg/dL (H)). according to latest data. Based on current GFR, CKD stage is end stage.  Monitor UOP and serial BMP and adjust therapy as needed. Renally dose meds. Avoid nephrotoxic medications and procedures.    Pt not on HD yet nearing ESRD.     Paroxysmal atrial fibrillation  Pt on eliquis as outpatient. Heparin while inpatient. Will continue BB     H/O kidney transplant  See CKD     Mixed hyperlipidemia  Continue statin           VTE Risk Mitigation (From admission, onward)           Ordered     IP VTE HIGH RISK PATIENT  Once         02/23/24 1122     Place sequential compression device  Until discontinued         02/23/24 1122     heparin 25,000 units in dextrose 5% (100 units/ml) IV bolus from bag LOW INTENSITY nomogram - [facility]  As needed (PRN)        Question:  Heparin Infusion Adjustment (DO NOT MODIFY ANSWER)  Answer:  \\[email or web address]\epic\Images\Pharmacy\HeparinInfusions\heparin LOW INTENSITY nomogram for [facility] [id number]    02/23/24 0708     heparin 25,000 units in dextrose 5% (100 units/ml) IV bolus from bag LOW INTENSITY nomogram - [facility]  As needed (PRN)        Question:  Heparin  Infusion Adjustment (DO NOT MODIFY ANSWER)  Answer:  [email or web address]\epic\Images\Pharmacy\HeparinInfusions\heparin LOW INTENSITY nomogram for [facility] [id number]    02/23/24 0708     heparin 25,000 units in dextrose 5% 250 mL (100 units/mL) infusion LOW INTENSITY nomogram - [facility]  Continuous        Question:  Begin at (units/kg/hr)  Answer:  12    02/23/24 0708                    Ruy García MD  Cardiology   [facility] - [facility] Step[facility]

## 2024-02-23 NOTE — ASSESSMENT & PLAN NOTE
Pt presented with crushing chest pain. Trop elevated to .08. ECG with ST depressions in inferior leads and dynamic changes. Bedside ECHO without WMA. Discussed case with IC staff. Initial plan for LHC although chest pain improved after 3 nitro and morphine. Pt also noted to have worsening kidney failure  and will likely need HD soon    -Admit to CCU   -Treat with ACS protocol. ASA/Plavix, HI statin, BB and heparin gtt  -Continue to trend troponin level.   - Formal ECHO ordered   -Given advanced CKD, discussed risks and benefits of LHC. Pt chest pain free at this time. If pt develops unrelenting chest pain will consider nitro gtt and potential LHC

## 2024-02-24 PROBLEM — T86.12 FAILED KIDNEY TRANSPLANT: Status: ACTIVE | Noted: 2024-02-24

## 2024-02-24 PROBLEM — E83.30: Status: ACTIVE | Noted: 2024-02-24

## 2024-02-24 LAB
ALBUMIN SERPL BCP-MCNC: 2.7 G/DL (ref 3.5–5.2)
ANION GAP SERPL CALC-SCNC: 11 MMOL/L (ref 8–16)
APTT PPP: 53.2 SEC (ref 21–32)
APTT PPP: 61.1 SEC (ref 21–32)
BASOPHILS # BLD AUTO: 0 K/UL (ref 0–0.2)
BASOPHILS NFR BLD: 0 % (ref 0–1.9)
BUN SERPL-MCNC: 68 MG/DL (ref 8–23)
CALCIUM SERPL-MCNC: 7.8 MG/DL (ref 8.7–10.5)
CHLORIDE SERPL-SCNC: 108 MMOL/L (ref 95–110)
CO2 SERPL-SCNC: 20 MMOL/L (ref 23–29)
CREAT SERPL-MCNC: 8.1 MG/DL (ref 0.5–1.4)
DIFFERENTIAL METHOD BLD: ABNORMAL
EOSINOPHIL # BLD AUTO: 0 K/UL (ref 0–0.5)
EOSINOPHIL NFR BLD: 0 % (ref 0–8)
ERYTHROCYTE [DISTWIDTH] IN BLOOD BY AUTOMATED COUNT: 17.8 % (ref 11.5–14.5)
EST. GFR  (NO RACE VARIABLE): 6.6 ML/MIN/1.73 M^2
GLUCOSE SERPL-MCNC: 118 MG/DL (ref 70–110)
HCT VFR BLD AUTO: 21.7 % (ref 40–54)
HGB BLD-MCNC: 7 G/DL (ref 14–18)
IMM GRANULOCYTES # BLD AUTO: 0.04 K/UL (ref 0–0.04)
IMM GRANULOCYTES NFR BLD AUTO: 0.5 % (ref 0–0.5)
LYMPHOCYTES # BLD AUTO: 0.6 K/UL (ref 1–4.8)
LYMPHOCYTES NFR BLD: 7.7 % (ref 18–48)
MCH RBC QN AUTO: 26.1 PG (ref 27–31)
MCHC RBC AUTO-ENTMCNC: 32.3 G/DL (ref 32–36)
MCV RBC AUTO: 81 FL (ref 82–98)
MONOCYTES # BLD AUTO: 0.7 K/UL (ref 0.3–1)
MONOCYTES NFR BLD: 8.5 % (ref 4–15)
NEUTROPHILS # BLD AUTO: 6.5 K/UL (ref 1.8–7.7)
NEUTROPHILS NFR BLD: 83.3 % (ref 38–73)
NRBC BLD-RTO: 0 /100 WBC
PHOSPHATE SERPL-MCNC: 5.1 MG/DL (ref 2.7–4.5)
PLATELET # BLD AUTO: 117 K/UL (ref 150–450)
PMV BLD AUTO: ABNORMAL FL (ref 9.2–12.9)
POCT GLUCOSE: 156 MG/DL (ref 70–110)
POTASSIUM SERPL-SCNC: 4.5 MMOL/L (ref 3.5–5.1)
RBC # BLD AUTO: 2.68 M/UL (ref 4.6–6.2)
SODIUM SERPL-SCNC: 139 MMOL/L (ref 136–145)
WBC # BLD AUTO: 7.84 K/UL (ref 3.9–12.7)

## 2024-02-24 PROCEDURE — 25000003 PHARM REV CODE 250

## 2024-02-24 PROCEDURE — 99233 SBSQ HOSP IP/OBS HIGH 50: CPT | Mod: ,,, | Performed by: INTERNAL MEDICINE

## 2024-02-24 PROCEDURE — 25000003 PHARM REV CODE 250: Performed by: STUDENT IN AN ORGANIZED HEALTH CARE EDUCATION/TRAINING PROGRAM

## 2024-02-24 PROCEDURE — 20600001 HC STEP DOWN PRIVATE ROOM

## 2024-02-24 PROCEDURE — 94640 AIRWAY INHALATION TREATMENT: CPT

## 2024-02-24 PROCEDURE — 63600175 PHARM REV CODE 636 W HCPCS: Performed by: STUDENT IN AN ORGANIZED HEALTH CARE EDUCATION/TRAINING PROGRAM

## 2024-02-24 PROCEDURE — 90935 HEMODIALYSIS ONE EVALUATION: CPT

## 2024-02-24 PROCEDURE — 85025 COMPLETE CBC W/AUTO DIFF WBC: CPT | Performed by: STUDENT IN AN ORGANIZED HEALTH CARE EDUCATION/TRAINING PROGRAM

## 2024-02-24 PROCEDURE — 63600175 PHARM REV CODE 636 W HCPCS

## 2024-02-24 PROCEDURE — 85730 THROMBOPLASTIN TIME PARTIAL: CPT | Performed by: INTERNAL MEDICINE

## 2024-02-24 PROCEDURE — 80069 RENAL FUNCTION PANEL: CPT

## 2024-02-24 PROCEDURE — 94761 N-INVAS EAR/PLS OXIMETRY MLT: CPT

## 2024-02-24 PROCEDURE — 25000003 PHARM REV CODE 250: Performed by: INTERNAL MEDICINE

## 2024-02-24 RX ORDER — GUAIFENESIN 100 MG/5ML
200 SOLUTION ORAL EVERY 4 HOURS PRN
Status: DISCONTINUED | OUTPATIENT
Start: 2024-02-24 | End: 2024-02-25

## 2024-02-24 RX ORDER — FUROSEMIDE 10 MG/ML
120 INJECTION INTRAMUSCULAR; INTRAVENOUS EVERY 12 HOURS
Status: DISCONTINUED | OUTPATIENT
Start: 2024-02-24 | End: 2024-02-24

## 2024-02-24 RX ADMIN — PREDNISONE 5 MG: 2.5 TABLET ORAL at 09:02

## 2024-02-24 RX ADMIN — TACROLIMUS 3 MG: 1 CAPSULE ORAL at 06:02

## 2024-02-24 RX ADMIN — FUROSEMIDE 120 MG: 10 INJECTION, SOLUTION INTRAVENOUS at 09:02

## 2024-02-24 RX ADMIN — METOPROLOL SUCCINATE 12.5 MG: 25 TABLET, EXTENDED RELEASE ORAL at 09:02

## 2024-02-24 RX ADMIN — DOXAZOSIN 4 MG: 2 TABLET ORAL at 08:02

## 2024-02-24 RX ADMIN — FAMOTIDINE 20 MG: 20 TABLET ORAL at 09:02

## 2024-02-24 RX ADMIN — ASPIRIN 81 MG: 81 TABLET, COATED ORAL at 09:02

## 2024-02-24 RX ADMIN — DOXAZOSIN 4 MG: 2 TABLET ORAL at 09:02

## 2024-02-24 RX ADMIN — SEVELAMER CARBONATE 800 MG: 800 TABLET, FILM COATED ORAL at 03:02

## 2024-02-24 RX ADMIN — TACROLIMUS 3 MG: 1 CAPSULE ORAL at 09:02

## 2024-02-24 RX ADMIN — HYDRALAZINE HYDROCHLORIDE 50 MG: 50 TABLET ORAL at 05:02

## 2024-02-24 RX ADMIN — NIFEDIPINE 60 MG: 60 TABLET, FILM COATED, EXTENDED RELEASE ORAL at 08:02

## 2024-02-24 RX ADMIN — HEPARIN SODIUM AND DEXTROSE 12 UNITS/KG/HR: 10000; 5 INJECTION INTRAVENOUS at 01:02

## 2024-02-24 RX ADMIN — GUAIFENESIN 200 MG: 200 SOLUTION ORAL at 04:02

## 2024-02-24 RX ADMIN — ATORVASTATIN CALCIUM 80 MG: 20 TABLET, FILM COATED ORAL at 09:02

## 2024-02-24 RX ADMIN — HYDRALAZINE HYDROCHLORIDE 50 MG: 50 TABLET ORAL at 08:02

## 2024-02-24 RX ADMIN — HYDRALAZINE HYDROCHLORIDE 50 MG: 50 TABLET ORAL at 03:02

## 2024-02-24 RX ADMIN — THERA TABS 1 TABLET: TAB at 09:02

## 2024-02-24 RX ADMIN — MUPIROCIN: 20 OINTMENT TOPICAL at 08:02

## 2024-02-24 RX ADMIN — TIOTROPIUM BROMIDE INHALATION SPRAY 2 PUFF: 1.56 SPRAY, METERED RESPIRATORY (INHALATION) at 09:02

## 2024-02-24 RX ADMIN — GUAIFENESIN 200 MG: 200 SOLUTION ORAL at 10:02

## 2024-02-24 RX ADMIN — NIFEDIPINE 60 MG: 60 TABLET, FILM COATED, EXTENDED RELEASE ORAL at 09:02

## 2024-02-24 RX ADMIN — AMIODARONE HYDROCHLORIDE 200 MG: 200 TABLET ORAL at 09:02

## 2024-02-24 RX ADMIN — MUPIROCIN: 20 OINTMENT TOPICAL at 09:02

## 2024-02-24 RX ADMIN — SEVELAMER CARBONATE 800 MG: 800 TABLET, FILM COATED ORAL at 09:02

## 2024-02-24 NOTE — NURSING
"Previous assessment remains unchanged.  Pt upset he was "woken up all the time" throughout shift.  Allowed pt to vent/verbalize feelings and support provided.  Denies needs at this time.  Report given to oncoming shift and care endorsed at this time  "

## 2024-02-24 NOTE — ASSESSMENT & PLAN NOTE
-initially presenting with chest pain, although attributed to increased LVEDP/volume overload  -No active chest pain, hemodynamically stable.  EKG on admission without ischemic changes  -completed 48hr heparin ACS protocol, heparin not previously trended, repeat obtained this a.m. markedly elevated at 13  -s/p C apparently on 2/23 however findings unknown as final report not in EMR  Plan:  -restart heparin ggt  -contacted Cardiology  -obtain TTE  -continue to monitor on telemetry

## 2024-02-24 NOTE — ASSESSMENT & PLAN NOTE
Creatine stable for now. BMP reviewed- noted Estimated Creatinine Clearance: 9.7 mL/min (A) (based on SCr of 8.1 mg/dL (H)). according to latest data. Based on current GFR, CKD stage is stage 5 - GFR < 15.  Monitor UOP and serial BMP and adjust therapy as needed. Renally dose meds. Avoid nephrotoxic medications and procedures.  -Fistula recently created 12/18/23 (unresponsive during recent outpatient fistulogram leading to previous hospitalization)  -Volume overloaded on exam, renal failure worsening. Previously ESRD s/p transplant x2, now progressed back to ESRD  -KTM discussing HD with patient, pt now agreeable. S/p first session on 2/24

## 2024-02-24 NOTE — PLAN OF CARE
Assessment completed, see flowsheets.  Currently denies pain.  Has dry cough and shortness of breath with minimal exertion.  Plan of care reviewed with pt and wife.  All questions answered at this time.  Denies needs currently, will continue to monitor.      Problem: Adult Inpatient Plan of Care  Goal: Plan of Care Review  Outcome: Ongoing, Progressing  Goal: Optimal Comfort and Wellbeing  Outcome: Ongoing, Progressing  Intervention: Monitor Pain and Promote Comfort  Flowsheets (Taken 2/23/2024 1943)  Pain Management Interventions:   relaxation techniques promoted   pillow support provided     Problem: Fall Injury Risk  Goal: Absence of Fall and Fall-Related Injury  Outcome: Ongoing, Progressing  Intervention: Identify and Manage Contributors  Flowsheets (Taken 2/23/2024 1943)  Self-Care Promotion: independence encouraged

## 2024-02-24 NOTE — PROGRESS NOTES
HD treatment complete. Duration of treatment 2 hours and 1.5 L removed. Treatment was tolerated well and no complications with access to the left upper arm. Need removed and hemostasis achieved. Dressing intact and no drainage noted. Thrill and bruit present.

## 2024-02-24 NOTE — NURSING TRANSFER
Nursing Transfer Note      2/24/2024   12:50 PM      Reason patient is being transferred: Dialysis    Transfer To: Dialysis    Transfer via wheelchair    Transfer with cardiac monitoring    Transported by [facility] transport      Patient belongings transferred with patient: No    Chart send with patient: Yes    Notified: spouse

## 2024-02-24 NOTE — PROGRESS NOTES
02/24/24 1058   Vital Signs   Temp 97.9 °F (36.6 °C)   Temp Source Oral   Pulse (!) 57   Heart Rate Source Monitor   Resp 18   SpO2 (!) 91 %   Device (Oxygen Therapy) room air   /64   MAP (mmHg) 88   BP Location Right arm   Patient Position Lying     Pt bradycardic to the 50s. Pt vitals otherwise stable. Pt asymptomatic. Ricardo DO notified.

## 2024-02-24 NOTE — PLAN OF CARE
AAOX4,VSS,O2 sats>92% on room air. Plan of care discussed with patient and wife. Patient has no complaints of pain/SOB. Discussed medications and care. Patient has no questions at this time.Pt visualised and stable.Call light within reach.Pt resting,bed at lowest position.    Problem: Adult Inpatient Plan of Care  Goal: Plan of Care Review  Outcome: Ongoing, Progressing  Goal: Patient-Specific Goal (Individualized)  Outcome: Ongoing, Progressing  Goal: Absence of Hospital-Acquired Illness or Injury  Outcome: Ongoing, Progressing  Goal: Optimal Comfort and Wellbeing  Outcome: Ongoing, Progressing  Goal: Readiness for Transition of Care  Outcome: Ongoing, Progressing     Problem: Fall Injury Risk  Goal: Absence of Fall and Fall-Related Injury  Outcome: Ongoing, Progressing     Problem: Device-Related Complication Risk (Hemodialysis)  Goal: Safe, Effective Therapy Delivery  Outcome: Ongoing, Progressing     Problem: Hemodynamic Instability (Hemodialysis)  Goal: Effective Tissue Perfusion  Outcome: Ongoing, Progressing     Problem: Infection (Hemodialysis)  Goal: Absence of Infection Signs and Symptoms  Outcome: Ongoing, Progressing

## 2024-02-24 NOTE — NURSING
Pt complaining of SOB, hard to breath. 3L/on  NC. MD [facility]U notified. Given IV Morphine per MD order.

## 2024-02-24 NOTE — PROGRESS NOTES
First HD treatment started. 16 g needles used. No complications with access to the left upper arm. Lines secured and telemetry in place. No complaints of discomfort at this time.

## 2024-02-24 NOTE — PLAN OF CARE
Hospital Medicine ICU Acceptance Note    Date of Admit: 2/23/2024  Date of Transfer / Stepdown: 2/24/2024  Eliud, C/J, L, Onc (IV chemo w/in 1 month), Gyn/Onc, or other special case?: No  ICU team stepping patient down: CCU  ICU team member giving handoff: V   Accepting HM team: B    History of Present Illness:     Mr. Phelps is a a 67 yo male with a PMHx of paroxysmal atrial fibrillation, tachy-loan syndrome, HFpEF with most recent LVEF 65%, CAD s/p PCI 2006, HTN, HLD, aortic atherosclerosis, secondary hyperparathyroidism, diverticulosis, COPD, GERD, hypothyroidism, gout, a history of tobacco use, obesity, ESRD s/p kidney transplant x2 (1992, 2005) on chronic immunosuppressive medication who presents with chest pain.      Recent admission and discharge 2/21/24. Syncope after vascular procedure. No syncopal episodes or events on telemetry were noted after admission. IV Lasix was given noting BNP > 2000 and overload on exam.     In the ED his HR was in 60-70s, -180s. Plts 121. K 2.9, Cr 7.8. Trop .08. BNP 1929. LA 1.71. CXr with pulmonary edema and pleural effusion on the right. ECF with AF with RVR with ST depressions in inferior leads and non-specific changes. Pt complaining of crushing chest pain which started this AM around 3 and continued until he got here. PT given Nitro in ED x3 and morphine twice. Pt noted chest pain is improved on my evaluation and he is chest pain free.     Hospital/ICU Course:     History of ESRD status post renal transplant x2 initially presented for chest pain, NSTEMI and status post ACS protocol.  Admitted to CCU, LHC was planned.  Likely noncardiac in nature per cardiology team.  Had been hypervolemic on exam, likely requiring dialysis. CXR with pulmonary edema and increased size of small right pleural effusion. Fistula had previously created on 12/2023 with plan for HD in the future.  Was recently discharged on 02/21, HD recommended by KTM team however pt had declined.  KTM currently on board and discussing HD with patient. Dizinaing for now.    Deemed appropriate for transfer to the floor on 2/24/2024, and was accepted to  team B for further care and management.    Consultants and Procedures:     Consultants:  Consults (From admission, onward)          Status Ordering Provider     Inpatient consult to Kidney/Pancreas Transplant Medicine  Once        Provider:  (Not yet assigned)    Completed SHAWNA BEAN     Inpatient consult to Cardiology  Once        Provider:  (Not yet assigned)    Completed PATITO GLASS            Procedures:    As documented    Transfer Information:     Diet:  As ordered    Physical Activity:  As tolerated      Pending plan at time of transfer to the floor:  Continue current plan initiated by ICU, will further monitor and adjust as clinically indicated upon arrival to the floor.    Patient has been accepted by [facility] Medicine Team B, who will assume care of the patient upon arrival to the floor from the ICU. Please contact ICU team with any concerns prior to transfer to the floor.      Yamil Silva,   02/24/2024

## 2024-02-24 NOTE — HPI
Mr. Phelps is a a 67 yo male with a PMHx of paroxysmal atrial fibrillation, tachy-loan syndrome, HFpEF with most recent LVEF 65%, CAD s/p PCI 2006, HTN, HLD, aortic atherosclerosis, secondary hyperparathyroidism, diverticulosis, COPD, GERD, hypothyroidism, gout, a history of tobacco use, obesity, ESRD s/p kidney transplant x2 (1992, 2005) on chronic immunosuppressive medication who presents with chest pain.      Recent admission and discharge 2/21/24. Syncope after vascular procedure. No syncopal episodes or events on telemetry were noted after admission. IV Lasix was given noting BNP > 2000 and overload on exam.     In the ED his HR was in 60-70s, -180s. Plts 121. K 2.9, Cr 7.8. Trop .08. BNP 1929. LA 1.71. CXr with pulmonary edema and pleural effusion on the right. ECF with AF with RVR with ST depressions in inferior leads and non-specific changes. Pt complaining of crushing chest pain which started this AM around 3 and continued until he got here. PT given Nitro in ED x3 and morphine twice. Pt noted chest pain is improved on my evaluation and he is chest pain free.

## 2024-02-24 NOTE — HOSPITAL COURSE
History of ESRD status post renal transplant x2 initially presented for chest pain, NSTEMI and on ACS protocol.  Admitted to CCU, LHC was initially planned however pt was noted to not be fully aware of procedural risks and in addition due to renal dysfunction therefore LHC was aborted.  Eventually done 2/26.  Found to have multivessel disease.  CT surgery consulted, deemed to not be surgical candidate. Now going for planned multivessel PCI on 2/28. Had been hypervolemic on exam, likely requiring dialysis. CXR with pulmonary edema and increased size of small right pleural effusion. Fistula had previously created on 12/2023 with plan for HD in the future.  Was recently discharged on 02/21, HD recommended by KTM team however pt had declined. KTM currently on board, pt now agreeable for dialysis. First session was on 2/24/24. Patient underwent successful PCI to RCA w/2 DIEGO 02/28. He was awaiting outpatient HD chair- [facility] w/chair, but unable to complete admission process until Tuesday, 03/05, for bed- patient on T/Thr/Sat schedule- patient underwent HD 03/02. He was deemed stable for discharge afterwards. HD chair ready for outpatient utilization. Discussed need to continue asa/plavix/eliquis x1mo- then plavix/eliquis indefinitely- will arrange outpatient cards f/u. Discussed tacro taper per transplant nephro recommendations. Lasix to be utilized PRN vs. Scheduled with re-initiation of HD.     Pt deemed appropriate for discharge. Plan discussed with pt, who was agreeable and amenable; medications were discussed and reviewed, outpatient follow-up scheduled, ER precautions were given, all questions were answered to the pt's satisfaction, and Mr. Phelps was subsequently discharged.    Physical Exam  Gen: in NAD, appears stated age  Neuro: AAOx3, motor, sensory, and strength grossly intact BL  HEENT: NTNC, EOMI, PERRL, MMM  CVS: RRR, no m/r/g; S1/S2 auscultated with no S3 or S4; capillary refill < 2 sec, AVF of the  LUE  Resp: lungs CTAB, no w/r/r; no belabored breathing or accessory muscle use appreciated   Abd: BS+ in all 4 quadrants; NTND, soft to palpation; no organomegaly appreciated   Extrem: pulses full, equal, and regular over all 4 extremities; no UE or LE edema BL- loose skin of BL LE

## 2024-02-25 PROBLEM — N18.6 ESRD (END STAGE RENAL DISEASE): Status: ACTIVE | Noted: 2023-11-06

## 2024-02-25 PROBLEM — I50.30 (HFPEF) HEART FAILURE WITH PRESERVED EJECTION FRACTION: Status: RESOLVED | Noted: 2017-09-25 | Resolved: 2024-02-25

## 2024-02-25 LAB
ALBUMIN SERPL BCP-MCNC: 2.9 G/DL (ref 3.5–5.2)
ANION GAP SERPL CALC-SCNC: 13 MMOL/L (ref 8–16)
APTT PPP: 26.8 SEC (ref 21–32)
APTT PPP: 33.2 SEC (ref 21–32)
APTT PPP: 41.2 SEC (ref 21–32)
BASOPHILS # BLD AUTO: 0.01 K/UL (ref 0–0.2)
BASOPHILS # BLD AUTO: 0.01 K/UL (ref 0–0.2)
BASOPHILS NFR BLD: 0.1 % (ref 0–1.9)
BASOPHILS NFR BLD: 0.1 % (ref 0–1.9)
BUN SERPL-MCNC: 56 MG/DL (ref 8–23)
CALCIUM SERPL-MCNC: 8 MG/DL (ref 8.7–10.5)
CHLORIDE SERPL-SCNC: 106 MMOL/L (ref 95–110)
CO2 SERPL-SCNC: 22 MMOL/L (ref 23–29)
CREAT SERPL-MCNC: 6.6 MG/DL (ref 0.5–1.4)
DIFFERENTIAL METHOD BLD: ABNORMAL
DIFFERENTIAL METHOD BLD: ABNORMAL
EOSINOPHIL # BLD AUTO: 0.1 K/UL (ref 0–0.5)
EOSINOPHIL # BLD AUTO: 0.1 K/UL (ref 0–0.5)
EOSINOPHIL NFR BLD: 0.8 % (ref 0–8)
EOSINOPHIL NFR BLD: 1.6 % (ref 0–8)
ERYTHROCYTE [DISTWIDTH] IN BLOOD BY AUTOMATED COUNT: 17.9 % (ref 11.5–14.5)
ERYTHROCYTE [DISTWIDTH] IN BLOOD BY AUTOMATED COUNT: 17.9 % (ref 11.5–14.5)
EST. GFR  (NO RACE VARIABLE): 8.5 ML/MIN/1.73 M^2
GLUCOSE SERPL-MCNC: 79 MG/DL (ref 70–110)
HAV IGM SERPL QL IA: NORMAL
HBV CORE AB SERPL QL IA: NORMAL
HBV SURFACE AB SER-ACNC: <3 MIU/ML
HBV SURFACE AB SER-ACNC: NORMAL M[IU]/ML
HBV SURFACE AG SERPL QL IA: NORMAL
HCT VFR BLD AUTO: 22.6 % (ref 40–54)
HCT VFR BLD AUTO: 23.4 % (ref 40–54)
HGB BLD-MCNC: 7.2 G/DL (ref 14–18)
HGB BLD-MCNC: 7.5 G/DL (ref 14–18)
IMM GRANULOCYTES # BLD AUTO: 0.02 K/UL (ref 0–0.04)
IMM GRANULOCYTES # BLD AUTO: 0.04 K/UL (ref 0–0.04)
IMM GRANULOCYTES NFR BLD AUTO: 0.2 % (ref 0–0.5)
IMM GRANULOCYTES NFR BLD AUTO: 0.5 % (ref 0–0.5)
INR PPP: 1.2 (ref 0.8–1.2)
LYMPHOCYTES # BLD AUTO: 1.4 K/UL (ref 1–4.8)
LYMPHOCYTES # BLD AUTO: 1.6 K/UL (ref 1–4.8)
LYMPHOCYTES NFR BLD: 16.4 % (ref 18–48)
LYMPHOCYTES NFR BLD: 17.7 % (ref 18–48)
MCH RBC QN AUTO: 25.9 PG (ref 27–31)
MCH RBC QN AUTO: 26.4 PG (ref 27–31)
MCHC RBC AUTO-ENTMCNC: 31.9 G/DL (ref 32–36)
MCHC RBC AUTO-ENTMCNC: 32.1 G/DL (ref 32–36)
MCV RBC AUTO: 81 FL (ref 82–98)
MCV RBC AUTO: 82 FL (ref 82–98)
MONOCYTES # BLD AUTO: 0.7 K/UL (ref 0.3–1)
MONOCYTES # BLD AUTO: 0.9 K/UL (ref 0.3–1)
MONOCYTES NFR BLD: 8.1 % (ref 4–15)
MONOCYTES NFR BLD: 9.8 % (ref 4–15)
NEUTROPHILS # BLD AUTO: 6.3 K/UL (ref 1.8–7.7)
NEUTROPHILS # BLD AUTO: 6.3 K/UL (ref 1.8–7.7)
NEUTROPHILS NFR BLD: 70.6 % (ref 38–73)
NEUTROPHILS NFR BLD: 74.1 % (ref 38–73)
NRBC BLD-RTO: 0 /100 WBC
NRBC BLD-RTO: 0 /100 WBC
PHOSPHATE SERPL-MCNC: 3.7 MG/DL (ref 2.7–4.5)
PLATELET # BLD AUTO: 114 K/UL (ref 150–450)
PLATELET # BLD AUTO: 128 K/UL (ref 150–450)
PMV BLD AUTO: ABNORMAL FL (ref 9.2–12.9)
PMV BLD AUTO: ABNORMAL FL (ref 9.2–12.9)
POCT GLUCOSE: 120 MG/DL (ref 70–110)
POTASSIUM SERPL-SCNC: 3.8 MMOL/L (ref 3.5–5.1)
PROTHROMBIN TIME: 12.7 SEC (ref 9–12.5)
RBC # BLD AUTO: 2.78 M/UL (ref 4.6–6.2)
RBC # BLD AUTO: 2.84 M/UL (ref 4.6–6.2)
SODIUM SERPL-SCNC: 141 MMOL/L (ref 136–145)
TROPONIN I SERPL DL<=0.01 NG/ML-MCNC: 13.24 NG/ML (ref 0–0.03)
WBC # BLD AUTO: 8.43 K/UL (ref 3.9–12.7)
WBC # BLD AUTO: 8.92 K/UL (ref 3.9–12.7)

## 2024-02-25 PROCEDURE — 20600001 HC STEP DOWN PRIVATE ROOM

## 2024-02-25 PROCEDURE — 36415 COLL VENOUS BLD VENIPUNCTURE: CPT | Performed by: INTERNAL MEDICINE

## 2024-02-25 PROCEDURE — 25000003 PHARM REV CODE 250: Performed by: STUDENT IN AN ORGANIZED HEALTH CARE EDUCATION/TRAINING PROGRAM

## 2024-02-25 PROCEDURE — 85730 THROMBOPLASTIN TIME PARTIAL: CPT | Performed by: STUDENT IN AN ORGANIZED HEALTH CARE EDUCATION/TRAINING PROGRAM

## 2024-02-25 PROCEDURE — 93010 ELECTROCARDIOGRAM REPORT: CPT | Mod: ,,, | Performed by: INTERNAL MEDICINE

## 2024-02-25 PROCEDURE — 86704 HEP B CORE ANTIBODY TOTAL: CPT | Performed by: INTERNAL MEDICINE

## 2024-02-25 PROCEDURE — 85025 COMPLETE CBC W/AUTO DIFF WBC: CPT | Performed by: STUDENT IN AN ORGANIZED HEALTH CARE EDUCATION/TRAINING PROGRAM

## 2024-02-25 PROCEDURE — 63600175 PHARM REV CODE 636 W HCPCS: Performed by: STUDENT IN AN ORGANIZED HEALTH CARE EDUCATION/TRAINING PROGRAM

## 2024-02-25 PROCEDURE — 36415 COLL VENOUS BLD VENIPUNCTURE: CPT | Mod: XB | Performed by: STUDENT IN AN ORGANIZED HEALTH CARE EDUCATION/TRAINING PROGRAM

## 2024-02-25 PROCEDURE — 85730 THROMBOPLASTIN TIME PARTIAL: CPT | Mod: 91 | Performed by: STUDENT IN AN ORGANIZED HEALTH CARE EDUCATION/TRAINING PROGRAM

## 2024-02-25 PROCEDURE — 86706 HEP B SURFACE ANTIBODY: CPT | Performed by: INTERNAL MEDICINE

## 2024-02-25 PROCEDURE — 27000221 HC OXYGEN, UP TO 24 HOURS

## 2024-02-25 PROCEDURE — 63600175 PHARM REV CODE 636 W HCPCS

## 2024-02-25 PROCEDURE — 87340 HEPATITIS B SURFACE AG IA: CPT | Performed by: INTERNAL MEDICINE

## 2024-02-25 PROCEDURE — 99233 SBSQ HOSP IP/OBS HIGH 50: CPT | Mod: ,,, | Performed by: INTERNAL MEDICINE

## 2024-02-25 PROCEDURE — 93005 ELECTROCARDIOGRAM TRACING: CPT

## 2024-02-25 PROCEDURE — 80069 RENAL FUNCTION PANEL: CPT

## 2024-02-25 PROCEDURE — 85610 PROTHROMBIN TIME: CPT | Performed by: STUDENT IN AN ORGANIZED HEALTH CARE EDUCATION/TRAINING PROGRAM

## 2024-02-25 PROCEDURE — 85025 COMPLETE CBC W/AUTO DIFF WBC: CPT | Mod: 91 | Performed by: STUDENT IN AN ORGANIZED HEALTH CARE EDUCATION/TRAINING PROGRAM

## 2024-02-25 PROCEDURE — 25000003 PHARM REV CODE 250

## 2024-02-25 PROCEDURE — 84484 ASSAY OF TROPONIN QUANT: CPT | Performed by: STUDENT IN AN ORGANIZED HEALTH CARE EDUCATION/TRAINING PROGRAM

## 2024-02-25 PROCEDURE — 94761 N-INVAS EAR/PLS OXIMETRY MLT: CPT

## 2024-02-25 PROCEDURE — 86709 HEPATITIS A IGM ANTIBODY: CPT | Performed by: INTERNAL MEDICINE

## 2024-02-25 PROCEDURE — 94640 AIRWAY INHALATION TREATMENT: CPT

## 2024-02-25 RX ORDER — HEPARIN SODIUM,PORCINE/D5W 25000/250
0-40 INTRAVENOUS SOLUTION INTRAVENOUS CONTINUOUS
Status: DISCONTINUED | OUTPATIENT
Start: 2024-02-25 | End: 2024-02-28

## 2024-02-25 RX ORDER — ACETAMINOPHEN 325 MG/1
650 TABLET ORAL EVERY 6 HOURS PRN
Status: DISCONTINUED | OUTPATIENT
Start: 2024-02-25 | End: 2024-03-02 | Stop reason: HOSPADM

## 2024-02-25 RX ORDER — SODIUM CHLORIDE 9 MG/ML
INJECTION, SOLUTION INTRAVENOUS
Status: CANCELLED | OUTPATIENT
Start: 2024-02-26

## 2024-02-25 RX ORDER — GUAIFENESIN 600 MG/1
600 TABLET, EXTENDED RELEASE ORAL 2 TIMES DAILY
Status: DISCONTINUED | OUTPATIENT
Start: 2024-02-25 | End: 2024-03-02 | Stop reason: HOSPADM

## 2024-02-25 RX ORDER — HYDRALAZINE HYDROCHLORIDE 25 MG/1
25 TABLET, FILM COATED ORAL EVERY 8 HOURS PRN
Status: DISCONTINUED | OUTPATIENT
Start: 2024-02-25 | End: 2024-03-02 | Stop reason: HOSPADM

## 2024-02-25 RX ADMIN — THERA TABS 1 TABLET: TAB at 08:02

## 2024-02-25 RX ADMIN — GUAIFENESIN 600 MG: 600 TABLET, EXTENDED RELEASE ORAL at 11:02

## 2024-02-25 RX ADMIN — DOXAZOSIN 4 MG: 2 TABLET ORAL at 08:02

## 2024-02-25 RX ADMIN — HEPARIN SODIUM AND DEXTROSE 12 UNITS/KG/HR: 10000; 5 INJECTION INTRAVENOUS at 12:02

## 2024-02-25 RX ADMIN — HEPARIN SODIUM AND DEXTROSE 12 UNITS/KG/HR: 10000; 5 INJECTION INTRAVENOUS at 02:02

## 2024-02-25 RX ADMIN — ATORVASTATIN CALCIUM 80 MG: 20 TABLET, FILM COATED ORAL at 08:02

## 2024-02-25 RX ADMIN — NIFEDIPINE 60 MG: 60 TABLET, FILM COATED, EXTENDED RELEASE ORAL at 09:02

## 2024-02-25 RX ADMIN — ASPIRIN 81 MG: 81 TABLET, COATED ORAL at 08:02

## 2024-02-25 RX ADMIN — FAMOTIDINE 20 MG: 20 TABLET ORAL at 08:02

## 2024-02-25 RX ADMIN — SEVELAMER CARBONATE 800 MG: 800 TABLET, FILM COATED ORAL at 08:02

## 2024-02-25 RX ADMIN — AMIODARONE HYDROCHLORIDE 200 MG: 200 TABLET ORAL at 08:02

## 2024-02-25 RX ADMIN — TIOTROPIUM BROMIDE INHALATION SPRAY 2 PUFF: 1.56 SPRAY, METERED RESPIRATORY (INHALATION) at 08:02

## 2024-02-25 RX ADMIN — TACROLIMUS 3 MG: 1 CAPSULE ORAL at 08:02

## 2024-02-25 RX ADMIN — GUAIFENESIN 600 MG: 600 TABLET, EXTENDED RELEASE ORAL at 09:02

## 2024-02-25 RX ADMIN — METOPROLOL SUCCINATE 12.5 MG: 25 TABLET, EXTENDED RELEASE ORAL at 08:02

## 2024-02-25 RX ADMIN — HYDRALAZINE HYDROCHLORIDE 50 MG: 50 TABLET ORAL at 07:02

## 2024-02-25 RX ADMIN — MUPIROCIN: 20 OINTMENT TOPICAL at 09:02

## 2024-02-25 RX ADMIN — PREDNISONE 5 MG: 2.5 TABLET ORAL at 08:02

## 2024-02-25 RX ADMIN — MUPIROCIN: 20 OINTMENT TOPICAL at 08:02

## 2024-02-25 RX ADMIN — HYDRALAZINE HYDROCHLORIDE 50 MG: 50 TABLET ORAL at 09:02

## 2024-02-25 RX ADMIN — HYDRALAZINE HYDROCHLORIDE 50 MG: 50 TABLET ORAL at 02:02

## 2024-02-25 RX ADMIN — SEVELAMER CARBONATE 800 MG: 800 TABLET, FILM COATED ORAL at 05:02

## 2024-02-25 RX ADMIN — TACROLIMUS 3 MG: 1 CAPSULE ORAL at 05:02

## 2024-02-25 RX ADMIN — DOXAZOSIN 4 MG: 2 TABLET ORAL at 09:02

## 2024-02-25 RX ADMIN — NIFEDIPINE 60 MG: 60 TABLET, FILM COATED, EXTENDED RELEASE ORAL at 08:02

## 2024-02-25 NOTE — PROGRESS NOTES
"KIDNEY TRANSPLANT MEDICINE PROGRESS NOTE    Please refer to detailed progress note by KTM provider dated 2/23/24 for complete details.    Interval history:  T tolerated dialysis initiation yesterday without issues.  Per nursing, small needles used due to concern that fistula underdeveloped.  He was able to tolerate 2 L ultrafiltration.  He has been on and off oxygen, and was wearing at the time of my visit.  His urine output with 870 cc in the last 24 hours      I/O last 3 completed shifts:  In: 1177 [P.O.:1177]  Out: 3523 [Urine:1520; Other:2003]    VITALS:  height is 6' 1" (1.854 m) and weight is 88.3 kg (194 lb 10.7 oz). His oral temperature is 98.3 °F (36.8 °C). His blood pressure is 142/65 (abnormal) and his pulse is 70. His respiration is 17 and oxygen saturation is 93% (abnormal).       A&O x3, NAD, chronically ill appearing  Lungs distant and diminished in bases, unlabored.  Heart regular with ectopy  Abdomen soft, nontender, no masses.  Edema: none noted peggy LE.  L UE AV fistula with bruit and thrill.    Recent Labs   Lab 02/24/24  0533 02/25/24  0300 02/25/24  1204   WBC 7.84 8.43 8.92   HGB 7.0* 7.5* 7.2*   HCT 21.7* 23.4* 22.6*   * 128* 114*       Recent Labs   Lab 02/23/24  1442 02/24/24  0533 02/25/24  0300    139 141   K 4.3 4.5 3.8    108 106   CO2 17* 20* 22*   BUN 64* 68* 56*   CREATININE 8.2* 8.1* 6.6*   CALCIUM 8.8 7.8* 8.0*   PHOS 4.9* 5.1* 3.7     Recent Labs   Lab 11/26/23  0443 02/20/24  0307 02/21/24  0306   Tacrolimus Lvl 4.5 L 3.4 L 3.5 L       ASSESSMENT/PLAN:    Problems/plans as noted in the referenced progress note. Additional pertinent findings:     Failed kidney transplant, now ESKD  -Re-initiated dialysis 2/24 without problems.    -plan dialysis #2 Monday 2/26  -Consulted social work to start dialysis placement  -Follow with strict I/Os, daily weights, BP (goal <140/90), daily labs.    -Avoid nephrotoxic agents when feasible (NSAIDs, IV contrast dye, " Aminoglycoside-containing antibiotics)  -Renally dose all appropriate medications, including antibiotics      Hyperphosphatemia  -better; continue sevelamer and renal diet   -if drops too low, we will be able to stop the binder while he is eating poorly    Immunosuppression management post Transplant  On prophylactic immunosuppression to prevent rejection. He remains in drug-induced immunosuppressed state, at risk of infections and, rejection, and toxicity. Thus, ongoing monitoring is needed to assess for toxicities and other adverse effects.   -recurrent tacrolimus level is not 12 hours and low, but given plan for slow wean of immunosuppression, will not increase.  He will require taper of IS to avoid abrupt & late rejection  -Continue to trend immunosuppression levels.   -Monitor for med-related side effects or drug toxicity given immunosuppressant med with narrow therapeutic window.     Plan of care re-reviewed with him and wife.  No additional questions noted at the end of our visit.

## 2024-02-25 NOTE — PROGRESS NOTES
"KIDNEY TRANSPLANT MEDICINE PROGRESS NOTE    Please refer to detailed progress note by KTM provider dated 2/23/24 for complete details.    Interval history:  He made little urine overnight, and understands he needs to start dialysis.  He reports he is ready to sign consent.  He denies any current chest pain.  No new complaints reported.  Documented urine output in previous 24 hours:  650 cc.    Past medical, surgical, family, social history reviewed & unchanged since admission.     I/O last 3 completed shifts:  In: 2157 [P.O.:2157]  Out: 3003 [Urine:1000; Other:2003]    VITALS:  height is 6' 1" (1.854 m) and weight is 88.3 kg (194 lb 10.7 oz). His oral temperature is 98 °F (36.7 °C). His blood pressure is 143/65 (abnormal) and his pulse is 58 (abnormal). His respiration is 17 and oxygen saturation is 94% (abnormal).       A&O x3, NAD.  Lungs distant and diminished in bases, + rare wheezes unlabored.  Heart irregular, no rub.  Abdomen soft, nontender, no masses.  Allograft nontender.  Edema: none noted peggy LE.  L UE AV fistula with bruit and thrill.    Recent Labs   Lab 02/23/24  0609 02/23/24  0715 02/24/24  0533   WBC 8.26 8.75 7.84   HGB 8.0* 7.7* 7.0*   HCT 25.7* 23.5* 21.7*   * 132* 117*       Recent Labs   Lab 02/23/24  0609 02/23/24  1442 02/24/24  0533     144 144 139   K 2.9*  2.9* 4.3 4.5   *  111* 108 108   CO2 17*  17* 17* 20*   BUN 65*  65* 64* 68*   CREATININE 7.8*  7.8* 8.2* 8.1*   CALCIUM 8.7  8.7 8.8 7.8*   PHOS 3.8 4.9* 5.1*     Recent Labs   Lab 11/26/23  0443 02/20/24  0307 02/21/24  0306   Tacrolimus Lvl 4.5 L 3.4 L 3.5 L       ASSESSMENT/PLAN:    Problems/plans as noted in the referenced progress note. Additional pertinent findings:     Failed kidney transplant, now ESKD  -agreeable to start dialysis, consent signed and orders written for HD 1, today, 2/24.  I reviewed plan for outpatient dialysis.  Orders placed for require labs from dialysis unit per " protocol.  -recommend consult social work to start dialysis placement  -Follow with strict I/Os, daily weights, BP (goal <140/90), daily labs.    -Avoid nephrotoxic agents when feasible (NSAIDs, IV contrast dye, Aminoglycoside-containing antibiotics)  -Renally dose all appropriate medications, including antibiotics      Hyperphosphatemia  -continue sevelamer and renal diet     Immunosuppression management post Transplant  On prophylactic immunosuppression to prevent rejection. He remains in drug-induced immunosuppressed state, at risk of infections and, rejection, and toxicity. Thus, ongoing monitoring is needed to assess for toxicities and other adverse effects.   -for now will continue current immunosuppression, as will need tapering to avoid abrupt rejection  -Continue to trend immunosuppression levels.   -Monitor for med-related side effects or drug toxicity given immunosuppressant med with narrow therapeutic window.

## 2024-02-25 NOTE — CONSULTS
[facility] - Cardiology Stepdown  Cardiology  Consult Note    Patient Name: Ibrahima Phelps  MRN: [id number]  Admission Date: 2/23/2024  Hospital Length of Stay: 2 days  Code Status: Full Code   Attending Provider: Yamil Silva DO   Consulting Provider: Gautam Apodaca MD  Primary Care Physician: Anatoliy Colindres MD  Principal Problem:NSTEMI (non-ST elevated myocardial infarction)    Patient information was obtained from patient and ER records.     Inpatient consult to Cardiology  Consult performed by: Gautam Mancuso MD  Consult ordered by: Mart Cordova MD        Subjective:     Chief Complaint:  Troponin elevation     HPI:   Mr. Phelps is a a 67 yo male with a PMHx of paroxysmal atrial fibrillation on amiodarone 200mg qd and apixaban 5mg bid, tachy-loan syndrome, HFpEF with most recent LVEF 65%, CAD s/p PCI 2006 on plavix, HTN, HLD, aortic atherosclerosis, secondary hyperparathyroidism, diverticulosis, COPD, GERD, hypothyroidism, gout, a history of tobacco use, obesity, ESRD s/p kidney transplant x2 (1992, 2005) on chronic immunosuppressive medication who was admitted to [facility] on 02/23/2024 with crushing chest pain, that improved on admission with nitro x3 and morphine x2. In the ED HR was in 60-70s, -180s, Plts 121. K 2.9, Cr 7.8. Trop 0.08. BNP 1929. LA 1.71. CXR with pulmonary edema and pleural effusion on the right. ECG with NSR with PACs, and ST depressions in inferior leads and non-specific changes. Admitted to CCU, and taken to cath lab for procedure. However, patient not fully aware of risks of procedure and kidney dysfunction, so LHC was aborted. Patient changed from Apixaban to heparin. Patient was initially refusing hemodialysis, but finally agreed with it on 02/24/2024, where 2L were removed. Patient was stepped down from CCU, and repeat Troponin was 13.2 (from 4).      Cardiology was consulted for ischemic evaluation.     Interval History: No chest pain,  troponin 13. Fluid status improving with hemodialysis.     Review of Systems   Constitutional: Negative.   HENT: Negative.     Cardiovascular:  Negative for chest pain, claudication, cyanosis, dyspnea on exertion, irregular heartbeat, leg swelling, near-syncope, orthopnea, palpitations and paroxysmal nocturnal dyspnea.   Respiratory: Negative.     Endocrine: Negative.    Musculoskeletal: Negative.    Gastrointestinal: Negative.    Genitourinary: Negative.    Neurological: Negative.      Objective:     Vital Signs (Most Recent):  Temp: 98 °F (36.7 °C) (02/25/24 1550)  Pulse: 68 (02/25/24 1550)  Resp: 18 (02/25/24 1550)  BP: (!) 122/58 (02/25/24 1550)  SpO2: 95 % (02/25/24 1550) Vital Signs (24h Range):  Temp:  [97.7 °F (36.5 °C)-98.4 °F (36.9 °C)] 98 °F (36.7 °C)  Pulse:  [57-73] 68  Resp:  [16-18] 18  SpO2:  [88 %-97 %] 95 %  BP: (122-156)/(58-70) 122/58     Weight: 88.3 kg (194 lb 10.7 oz)  Body mass index is 25.68 kg/m².     SpO2: 95 %         Intake/Output Summary (Last 24 hours) at 2/25/2024 1721  Last data filed at 2/25/2024 1237  Gross per 24 hour   Intake 710 ml   Output 770 ml   Net -60 ml       Lines/Drains/Airways       Peripheral Intravenous Line  Duration                  Hemodialysis AV Fistula 02/19/24 0924 Left upper arm 6 days         Peripheral IV - Single Lumen 02/23/24 0559 18 G Posterior;Right Hand 2 days         Peripheral IV - Single Lumen 02/23/24 0802 20 G Anterior;Right Forearm 2 days                       Telemetry: NSR 68bpm,  no acute events overnight but not continuous monitoring.     Physical Exam  Vitals and nursing note reviewed.   Constitutional:       General: He is not in acute distress.     Appearance: Normal appearance. He is normal weight. He is not ill-appearing or diaphoretic.   HENT:      Head: Normocephalic and atraumatic.   Eyes:      Pupils: Pupils are equal, round, and reactive to light.   Cardiovascular:      Rate and Rhythm: Normal rate and regular rhythm.      Pulses:  Normal pulses.      Heart sounds: Normal heart sounds.      Comments: JVD (+)  Pulmonary:      Effort: Pulmonary effort is normal.      Breath sounds: Normal breath sounds.   Abdominal:      General: Abdomen is flat. Bowel sounds are normal. There is no distension.      Palpations: Abdomen is soft. There is no mass.      Tenderness: There is no abdominal tenderness.   Musculoskeletal:         General: Normal range of motion.   Skin:     General: Skin is warm.      Capillary Refill: Capillary refill takes less than 2 seconds.   Neurological:      General: No focal deficit present.      Mental Status: He is alert and oriented to person, place, and time.            Significant Labs:     Recent Labs   Lab 02/24/24  0533 02/25/24  0300 02/25/24  1204   WBC 7.84 8.43 8.92   HGB 7.0* 7.5* 7.2*   HCT 21.7* 23.4* 22.6*   * 128* 114*       Recent Labs   Lab 02/23/24  1442 02/24/24  0533 02/25/24  0300    139 141   K 4.3 4.5 3.8    108 106   CO2 17* 20* 22*   BUN 64* 68* 56*   CREATININE 8.2* 8.1* 6.6*   CALCIUM 8.8 7.8* 8.0*   PHOS 4.9* 5.1* 3.7       Recent Labs   Lab 02/23/24  0609   ALKPHOS 55   BILITOT 0.5   PROT 6.1   ALT 19   AST 7*     Recent Labs   Lab 02/23/24  0609 02/23/24  1442 02/25/24  0825   TROPONINI 0.080* 4.205* 13.243*     Significant Imaging:     EKG 02/25/2024  - NSR with PACs, nonspecific ST-T wave changes    EKG 02/23/2024  - NSR with sinus arrhythmia, LVH with repolarization abnormality, prolonged QT    9/13/23 PET     There is a small sized, mild intensity apical inferior and inferoseptal reversible perfusion abnormality involving 6% of LV myocardium indicative of focal coronary stenosis.    Within the perfusion abnormality, absolute myocardial perfusion (cc/min/gm) averaged 0.56 cc/min/g at rest, 0.71 cc/min/g at stress and CFR was 1.27 , which equates to severely reduced coronary flow capacity within the LAD territory.    There are no other significant perfusion abnormalities.     The whole heart absolute myocardial perfusion values averaged 0.74 cc/min/g at rest, which is normal; 1.17 cc/min/g at stress, which is mildly reduced; and CFR is 1.59 , which is mildly reduced.    CT attenuation images demonstrate severe diffuse coronary calcifications in the LAD, and moderate diffuse coronary calcifications in the LCX and RCA territory and mild diffuse aortic calcifications in the descending aorta.    The gated perfusion images showed an ejection fraction of 53% at rest and 56% during stress. A normal ejection fraction is greater than 47%.    The wall motion is normal at rest and during stress.    The LV cavity size is mildly enlarged at rest and stress.    The ECG portion of the study is abnormal but not diagnostic due to resting ST-T abnormalities.    There were no arrhythmias during stress.    The patient reported chest pain during the stress test.    There are no prior studies for comparison.        11/24/2023 TTE    Left Ventricle: The left ventricle is normal in size. Ventricular mass is normal. Normal wall thickness. There is concentric remodeling. Normal wall motion. There is normal systolic function with a visually estimated ejection fraction of 55 - 60%. Biplane (2D) method of discs ejection fraction is 55%. There is diastolic dysfunction. Elevated left ventricular filling pressure.    Right Ventricle: Normal right ventricular cavity size. Systolic function is normal.    Left Atrium: Left atrium is moderately dilated.    Aortic Valve: There is moderate aortic valve sclerosis. Moderately restricted motion. There is mild to moderate stenosis. Aortic valve area by VTI is 1.34 cm². Aortic valve peak velocity is 2.92 m/s. Mean gradient is 18 mmHg. The dimensionless index is 0.41. There is mild to moderate aortic regurgitation.    Mitral Valve: There is moderate posterior mitral annular calcification present. There is mild regurgitation.    Tricuspid Valve: There is mild regurgitation.     Pulmonic Valve: There is mild regurgitation.    Pulmonary Artery: The estimated pulmonary artery systolic pressure is 45 mmHg.    IVC/SVC: Intermediate venous pressure at 8 mmHg.  Assessment and Plan:     * NSTEMI (non-ST elevated myocardial infarction)  - Patient initially admitted with crushing chest pain, Trop elevated to 0.08, and ECG with ST depressions in inferior leads and dynamic changes.   - Bedside ECHO without WMA  - LHC was planned, but before procedure patient was not really sure about risks of kidney failure  - He was also volume overload and refusing hemodialysis at that time.  - Now patient had 2L of fluid removal with HD, and he has no chest pain, SOB, or anginal equivalent.    Recommendations  - Discussion with patient and nephrology about risks of intervention and contrast use, in the setting of kidney damage  - Continue treatment with ASA, atorvastatin 80mg qd, metoprolol succinate 12.5mg qd  - Continue heparin gtt  - Consult interventional cardiology tomorrow   -     ESRD (end stage renal disease)  BMP reviewed- noted Estimated Creatinine Clearance: 11.9 mL/min (A) (based on SCr of 6.6 mg/dL (H)). according to latest data. Based on current GFR, CKD is end stage.  Monitor UOP and serial BMP and adjust therapy as needed. Renally dose meds. Avoid nephrotoxic medications and procedures.  - Started hemodialysis and 2L removed  - Nephrology following    Paroxysmal atrial fibrillation  - Continue anticoagulation (apixaban outpatient), heparin inpatient  - Continue metoprolol succinate 12.5mg qd    H/O kidney transplant  See CKD     Mixed hyperlipidemia  Continue statin           VTE Risk Mitigation (From admission, onward)           Ordered     heparin 25,000 units in dextrose 5% (100 units/ml) IV bolus from bag LOW INTENSITY nomogram - [facility]  As needed (PRN)        Question:  Heparin Infusion Adjustment (DO NOT MODIFY ANSWER)  Answer:  [email or web address]\Images\Pharmacy\HeparinInfusions\heparin LOW  INTENSITY nomogram for [facility] [id number]    02/25/24 1129     heparin 25,000 units in dextrose 5% (100 units/ml) IV bolus from bag LOW INTENSITY nomogram - [facility]  As needed (PRN)        Question:  Heparin Infusion Adjustment (DO NOT MODIFY ANSWER)  Answer:  [email or web address]\Images\Pharmacy\HeparinInfusions\heparin LOW INTENSITY nomogram for [facility] [id number]    02/25/24 1129     heparin 25,000 units in dextrose 5% 250 mL (100 units/mL) infusion LOW INTENSITY nomogram - [facility]  Continuous        Question:  Begin at (units/kg/hr)  Answer:  12    02/25/24 1129     IP VTE HIGH RISK PATIENT  Once         02/23/24 1122     Place sequential compression device  Until discontinued         02/23/24 1122                    Thank you for your consult.     Gautam Apodaca MD  Cardiology   [facility] - Cardiology Stepdown

## 2024-02-25 NOTE — CONSULTS
[facility] - [facility] Stepdown  Cardiology  Consult Note    Patient Name: Ibrahima Phelps  MRN: [id number]  Admission Date: 2/23/2024  Hospital Length of Stay: 2 days  Code Status: Full Code   Attending Provider: Yamil Silva DO   Consulting Provider: Gautam Apodaca MD  Primary Care Physician: Anatoliy Colindres MD  Principal Problem:NSTEMI (non-ST elevated myocardial infarction)    Patient information was obtained from patient and ER records.     Inpatient consult to Cardiology  Consult performed by: Gautam Mancuso MD  Consult ordered by: Yamil Silva DO        Subjective:     Chief Complaint:  Troponin elevation     HPI:   67yo M patient with paroxysmal atrial fibrillation on apixaban, tachy-loan syndrome, HFpEF (EF 65%), CAD s/p PCI 2006, HTN, HLD, aortic atherosclerosis, secondary hyperparathyroidism, diverticulosis, COPD, GERD, hypothyroidism, gout, a history of tobacco use, obesity, ESRD s/p kidney transplant x2 (1992, 2005) on chronic immunosuppressive medication, who was admitted to [facility] on 02/23/2024 for crushing chest pain, which resolved with ntiro and morphine. On admission HR was in 60-70s, -180s. Plts 121. K 2.9, Cr 7.8. Trop .08. BNP 1929. LA 1.71. CXR with pulmonary edema and pleural effusion on the right. EKG with AF with RVR with ST depressions in inferior leads and non-specific changes.     Admitted to CCU, C was planned but aborted for unknown reasons. Has been hypervolemic on exam, refusing dialysis at the start of admission. The volume overload is likely the reason for chest pain. Last EKG 02/25/2024 NSR with PACs, nonspecific ST changes, resolved ST depression from prio EKGs.       Past Medical History:   Diagnosis Date    Atrial fibrillation     CAD (coronary artery disease) 2006    MI in 2006    CHF (congestive heart failure) 2017    CKD (chronic kidney disease) stage 3, GFR 30-59 ml/min     Dr. Latif.  in transplanted kidney    CKD (chronic  "kidney disease) stage 5, GFR less than 15 ml/min 02/02/2024    COVID-19 02/07/2023    Diverticulosis     Hyperlipidemia     Hypertension     Keloid cicatrix     Metabolic bone disease     Other emphysema 10/01/2019    Pericarditis     S/P kidney transplant 1992    x2 (1992 & 2005),    Thrombocytopenia     Thyroid disease     Tobacco use 09/05/2014       Past Surgical History:   Procedure Laterality Date    AV FISTULA PLACEMENT Left 12/18/2023    Procedure: CREATION, AV FISTULA;  Surgeon: JUANI Melendez III, MD;  Location: [facility] OR [facility] FLR;  Service: Vascular;  Laterality: Left;  LUE AVF creation vs AVG insertion    CARDIOVERSION  04/30/15    CHOLECYSTECTOMY      COLONOSCOPY  April 20, 2011    Diverticulosis, repeat recommended in 3 yrs., repeat colonoscopy 2014 revealed 2 polyps.  He should return in 5 years.    COLONOSCOPY N/A 3/13/2020    Procedure: COLONOSCOPY;  Surgeon: Chon Casper MD;  Location: [facility] (4TH FLR);  Service: Endoscopy;  Laterality: N/A;  ok to hold coumadin x5days-see telephone encounter 2/4/20-tb    COLONOSCOPY N/A 2/4/2021    Procedure: COLONOSCOPY;  Surgeon: Chon Casper MD;  Location: [facility] ENDO (4TH FLR);  Service: Endoscopy;  Laterality: N/A;  Eliquis - per Dr. GAVIN chua to hold x 2 days and "restarted asap"- ERW  last prep "poor", [id number] ordered/ Pt requests Dr. Casper  prep ins. mailed - COVID screening on 2/1/21 PCW- ERW    COLONOSCOPY N/A 3/3/2021    Procedure: COLONOSCOPY;  Surgeon: Chon Casper MD;  Location: [facility] (4TH FLR);  Service: Endoscopy;  Laterality: N/A;  Patient with his second poor bowel pre and has poor prep today.  If patient not intersted or can't get colonoscopy tomorrow will need constipation bowel prep and will need to restart his Eliquis today.Thanks,Chon     per Dr Blunt-ok to hold Eliquis 2 days prior      COVID     CORONARY ANGIOPLASTY WITH STENT PLACEMENT  9/13/2006    FISTULOGRAM N/A 2/19/2024    Procedure: Fistulogram;  Surgeon: " JUANI Melendez III, MD;  Location: [facility] CATH LAB;  Service: Peripheral Vascular;  Laterality: N/A;    IRRIGATION AND DEBRIDEMENT Right 8/30/2023    Procedure: IRRIGATION AND DEBRIDEMENT, RIGHT MIDDLE FINGER;  Surgeon: Martin Larsen MD;  Location: [facility] OR [facility] FLR;  Service: Orthopedics;  Laterality: Right;    KIDNEY TRANSPLANT  2005    PARATHYROIDECTOMY      PERCUTANEOUS TRANSLUMINAL ANGIOPLASTY OF ARTERIOVENOUS FISTULA Left 2/19/2024    Procedure: PTA, AV FISTULA;  Surgeon: JUANI Melendez III, MD;  Location: [facility] CATH LAB;  Service: Peripheral Vascular;  Laterality: Left;    TREATMENT OF CARDIAC ARRHYTHMIA N/A 3/28/2022    Procedure: CARDIOVERSION;  Surgeon: Migue Blunt MD;  Location: [facility] EP LAB;  Service: Cardiology;  Laterality: N/A;  AF, DCC, MAC, GP, 3 PREP*RODRIGO deferred pt 100% compliant*       Review of patient's allergies indicates:   Allergen Reactions    Ace inhibitors Swelling    Verapamil Other (See Comments)     Other reaction(s): Unknown    Povidone-iodine Itching       No current facility-administered medications on file prior to encounter.     Current Outpatient Medications on File Prior to Encounter   Medication Sig    acetaminophen (TYLENOL) 500 MG tablet Take 1 tablet (500 mg total) by mouth every 6 (six) hours as needed for Pain.    albuterol (VENTOLIN HFA) 90 mcg/actuation inhaler Inhale 2 puffs into the lungs every 6 (six) hours as needed for Wheezing or Shortness of Breath. Rescue    albuterol-ipratropium (DUO-NEB) 2.5 mg-0.5 mg/3 mL nebulizer solution Take 3 mLs by nebulization every 6 (six) hours as needed for Wheezing. Rescue. Can be used in place of albuterol inhaler.    amiodarone (PACERONE) 200 MG Tab Take 1 tablet (200 mg total) by mouth once daily.    apixaban (ELIQUIS) 5 mg Tab Take 1 tablet (5 mg total) by mouth 2 (two) times daily.    atorvastatin (LIPITOR) 40 MG tablet Take 2 tablets (80 mg total) by mouth once daily.    b complex vitamins (B COMPLEX-VITAMIN B12)  tablet Take 1 tablet by mouth once daily.    calcium carbonate (CALCIUM 600 ORAL) Take 1 tablet by mouth 2 (two) times a day.    CHOLECALCIFEROL, VITAMIN D3, ORAL Take 2 tablets by mouth 2 (two) times daily.    clopidogreL (PLAVIX) 75 mg tablet Take 1 tablet (75 mg total) by mouth once daily.    doxazosin (CARDURA) 4 MG tablet Take 1 tablet (4 mg total) by mouth every 12 (twelve) hours.    famotidine (PEPCID) 20 MG tablet Take 1 tablet (20 mg total) by mouth 2 (two) times daily.    fexofenadine HCl (ALLEGRA ORAL) Take 1 tablet by mouth daily as needed (allergies).    fluticasone furoate-vilanteroL (BREO) 100-25 mcg/dose diskus inhaler Inhale 1 puff into the lungs once daily. Controller. Use this inhaler every day. (Patient not taking: Reported on 2/6/2024)    fluticasone propionate (FLONASE) 50 mcg/actuation nasal spray 1 spray (50 mcg total) by Each Nostril route daily as needed for Allergies.    FLUZONE HIGHDOSE QUAD 23-24  mcg/0.7 mL Syrg     furosemide (LASIX) 80 MG tablet Take 1 tablet (80 mg total) by mouth 2 (two) times a day.    gabapentin (NEURONTIN) 100 MG capsule Take 1 capsule (100 mg total) by mouth as needed (pain).    hydrALAZINE (APRESOLINE) 50 MG tablet TAKE 1 TABLET (50 MG TOTAL) BY MOUTH 3 (THREE) TIMES DAILY.    HYDROcodone-acetaminophen (NORCO) 5-325 mg per tablet Take 1 tablet by mouth every 6 (six) hours as needed for Pain.    isosorbide mononitrate (IMDUR) 30 MG 24 hr tablet Take 4 tablets (120 mg total) by mouth once daily.    metoprolol succinate (TOPROL-XL) 25 MG 24 hr tablet Take 0.5 tablets (12.5 mg total) by mouth once daily.    MITIGARE 0.6 mg Cap TAKE 1 CAPSULE BY MOUTH TWICE A DAY AS NEEDED GOUT FLARE UP TO 3 DAYS    multivitamin (THERAGRAN) per tablet Take 1 tablet by mouth Daily.    NIFEdipine (PROCARDIA-XL) 60 MG (OSM) 24 hr tablet Take 1 tablet (60 mg total) by mouth 2 (two) times a day.    nitroGLYCERIN (NITROSTAT) 0.4 MG SL tablet Place 1 tablet (0.4 mg total) under the  tongue every 5 (five) minutes as needed for Chest pain. (Patient not taking: Reported on 2/6/2024)    omeprazole (PRILOSEC) 20 MG capsule Take 1 capsule (20 mg total) by mouth daily as needed (heartburn).    paricalcitoL (ZEMPLAR) 1 MCG capsule TAKE 1 CAPSULE ON MONDAY, WEDNESDAY AND FRIDAY    [facility] COVID BIVAL,12Y UP,,PF, 30 mcg/0.3 mL injection Inject into the muscle.    potassium chloride (KLOR-CON) 10 MEQ TbSR Take 1 tablet (10 mEq total) by mouth daily as needed (to be taken with lasix).,    predniSONE (DELTASONE) 5 MG tablet Take 1 tablet (5 mg total) by mouth once daily.    pulse oximeter (PULSE OXIMETER) device by Apply Externally route 2 (two) times a day. Use twice daily at 8 AM and 3 PM and record the value in MyChart as directed.    sevelamer carbonate (RENVELA) 800 mg Tab Take 1 tablet (800 mg total) by mouth 3 (three) times daily with meals.    sodium polystyrene (KAYEXALATE) powder Take 60 mLs (15 g total) by mouth once daily.  (Using the teaspoon provided, scoop 4 level teaspoonsfuls, add 60 ml of water to powder.  Shake well and drink immediately.)    SPIKEVAX 3805-3922,12Y UP,,PF, 50 mcg/0.5 mL injection     tacrolimus (PROGRAF) 1 MG Cap TAKE 3 CAPSULES (3 MG TOTAL) BY MOUTH EVERY 12 (TWELVE) HOURS.    tiotropium bromide (SPIRIVA RESPIMAT) 1.25 mcg/actuation inhaler Inhale 2 puffs into the lungs once daily. Controller. Use this inhaler every day.    traZODone (DESYREL) 50 MG tablet Take 1 tablet (50 mg total) by mouth nightly as needed for Insomnia.     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     Patient is currently retired   Substance and Sexual Activity    Alcohol use: No    Drug use: No    Sexual activity: Yes     Partners: Female     Review of Systems   Constitutional: Negative.   HENT: Negative.     Cardiovascular:  Negative for chest pain, claudication, cyanosis, dyspnea on exertion, irregular heartbeat, leg swelling, near-syncope, orthopnea, palpitations and paroxysmal nocturnal dyspnea.   Respiratory: Negative.     Endocrine: Negative.    Musculoskeletal: Negative.    Gastrointestinal: Negative.    Genitourinary: Negative.    Neurological: Negative.      Objective:     Vital Signs (Most Recent):  Temp: 98.3 °F (36.8 °C) (24 1114)  Pulse: 70 (24 1114)  Resp: 17 (24 1114)  BP: (!) 142/65 (24 1114)  SpO2: (!) 93 % (24 1123) Vital Signs (24h Range):  Temp:  [97.7 °F (36.5 °C)-98.4 °F (36.9 °C)] 98.3 °F (36.8 °C)  Pulse:  [57-73] 70  Resp:  [16-18] 17  SpO2:  [88 %-97 %] 93 %  BP: (135-156)/(65-70) 142/65     Weight: 88.3 kg (194 lb 10.7 oz)  Body mass index is 25.68 kg/m².    SpO2: (!) 93 %         Intake/Output Summary (Last 24 hours) at 2024 1520  Last data filed at 2024 1237  Gross per 24 hour   Intake 1052 ml   Output 770 ml   Net 282 ml       Lines/Drains/Airways       Peripheral Intravenous Line  Duration                  Hemodialysis AV Fistula 24 0924 Left upper arm 6 days         Peripheral IV - Single Lumen 24 0559 18 G Posterior;Right Hand 2 days         Peripheral IV - Single Lumen 24 0802 20 G Anterior;Right Forearm 2 days                     Telemetry: NSR with HR 77.    Physical Exam  Vitals and nursing note reviewed.   Constitutional:       General: He is not in acute distress.     Appearance: Normal appearance. He is normal weight. He is not ill-appearing  or diaphoretic.   HENT:      Head: Normocephalic and atraumatic.   Eyes:      Pupils: Pupils are equal, round, and reactive to light.   Cardiovascular:      Rate and Rhythm: Normal rate and regular rhythm.      Pulses: Normal pulses.      Heart sounds: Normal heart sounds.   Pulmonary:      Effort: Pulmonary effort is normal.      Breath sounds: Normal breath sounds.   Abdominal:      General: Abdomen is flat. Bowel sounds are normal. There is no distension.      Palpations: Abdomen is soft. There is no mass.      Tenderness: There is no abdominal tenderness.   Musculoskeletal:         General: Normal range of motion.   Skin:     General: Skin is warm.      Capillary Refill: Capillary refill takes less than 2 seconds.   Neurological:      General: No focal deficit present.      Mental Status: He is alert and oriented to person, place, and time.          Significant Labs:     Recent Labs   Lab 02/24/24  0533 02/25/24  0300 02/25/24  1204   WBC 7.84 8.43 8.92   HGB 7.0* 7.5* 7.2*   HCT 21.7* 23.4* 22.6*   * 128* 114*       Recent Labs   Lab 02/23/24  1442 02/24/24  0533 02/25/24  0300    139 141   K 4.3 4.5 3.8    108 106   CO2 17* 20* 22*   BUN 64* 68* 56*   CREATININE 8.2* 8.1* 6.6*   CALCIUM 8.8 7.8* 8.0*   PHOS 4.9* 5.1* 3.7       Recent Labs   Lab 02/23/24  0609   ALKPHOS 55   BILITOT 0.5   PROT 6.1   ALT 19   AST 7*       Recent Labs   Lab 02/23/24  0609 02/23/24  1442 02/25/24  0825   TROPONINI 0.080* 4.205* 13.243*     Significant Imaging:     TTE 11/24/2023    Left Ventricle: The left ventricle is normal in size. Ventricular mass is normal. Normal wall thickness. There is concentric remodeling. Normal wall motion. There is normal systolic function with a visually estimated ejection fraction of 55 - 60%. Biplane (2D) method of discs ejection fraction is 55%. There is diastolic dysfunction. Elevated left ventricular filling pressure.    Right Ventricle: Normal right ventricular cavity size.  Systolic function is normal.    Left Atrium: Left atrium is moderately dilated.    Aortic Valve: There is moderate aortic valve sclerosis. Moderately restricted motion. There is mild to moderate stenosis. Aortic valve area by VTI is 1.34 cm². Aortic valve peak velocity is 2.92 m/s. Mean gradient is 18 mmHg. The dimensionless index is 0.41. There is mild to moderate aortic regurgitation.    Mitral Valve: There is moderate posterior mitral annular calcification present. There is mild regurgitation.    Tricuspid Valve: There is mild regurgitation.    Pulmonic Valve: There is mild regurgitation.    Pulmonary Artery: The estimated pulmonary artery systolic pressure is 45 mmHg.    IVC/SVC: Intermediate venous pressure at 8 mmHg.      PET Stress 09/13/2023    There is a small sized, mild intensity apical inferior and inferoseptal reversible perfusion abnormality involving 6% of LV myocardium indicative of focal coronary stenosis.    Within the perfusion abnormality, absolute myocardial perfusion (cc/min/gm) averaged 0.56 cc/min/g at rest, 0.71 cc/min/g at stress and CFR was 1.27 , which equates to severely reduced coronary flow capacity within the LAD territory.    There are no other significant perfusion abnormalities.    The whole heart absolute myocardial perfusion values averaged 0.74 cc/min/g at rest, which is normal; 1.17 cc/min/g at stress, which is mildly reduced; and CFR is 1.59 , which is mildly reduced.    CT attenuation images demonstrate severe diffuse coronary calcifications in the LAD, and moderate diffuse coronary calcifications in the LCX and RCA territory and mild diffuse aortic calcifications in the descending aorta.    The gated perfusion images showed an ejection fraction of 53% at rest and 56% during stress. A normal ejection fraction is greater than 47%.    The wall motion is normal at rest and during stress.    The LV cavity size is mildly enlarged at rest and stress.    The ECG portion of the study  is abnormal but not diagnostic due to resting ST-T abnormalities.    There were no arrhythmias during stress.    The patient reported chest pain during the stress test.    There are no prior studies for comparison.      Assessment and Plan:     Troponin level elevated  Elevated troponin - Myocardial injury  - Elevation of troponin are specific for cardiomyocyte damage, but does not identify the cause of it  - For a diagnosis of myocardial infarction, the fourth international definition of MI require: symptoms of ischemia, and ECG or imaging findings consistent with ischemia, or biomarkers  - Patient without symptoms of ischemia, EKG without ST-changes consistent with ischemia and known volume overload which likely causing increased Myocardial oxygen demand     - No indications for ACS protocol  - Treat underlying insult: Hemodialysis  - Initiate ASCVD prevention        VTE Risk Mitigation (From admission, onward)           Ordered     heparin 25,000 units in dextrose 5% (100 units/ml) IV bolus from bag LOW INTENSITY nomogram - [facility]  As needed (PRN)        Question:  Heparin Infusion Adjustment (DO NOT MODIFY ANSWER)  Answer:  [email or web address]\epic\Images\Pharmacy\HeparinInfusions\heparin LOW INTENSITY nomogram for [facility] [id number]    02/25/24 1129     heparin 25,000 units in dextrose 5% (100 units/ml) IV bolus from bag LOW INTENSITY nomogram - [facility]  As needed (PRN)        Question:  Heparin Infusion Adjustment (DO NOT MODIFY ANSWER)  Answer:  [email or web address]\epic\Images\Pharmacy\HeparinInfusions\heparin LOW INTENSITY nomogram for [facility] [id number]    02/25/24 1129     heparin 25,000 units in dextrose 5% 250 mL (100 units/mL) infusion LOW INTENSITY nomogram - [facility]  Continuous        Question:  Begin at (units/kg/hr)  Answer:  12    02/25/24 1129     IP VTE HIGH RISK PATIENT  Once         02/23/24 1122     Place sequential compression device  Until discontinued         02/23/24 1122                    Thank you for your consult.      Gautam Apodaca MD  Cardiology   [facility] - Cardiology Stepdown

## 2024-02-25 NOTE — ASSESSMENT & PLAN NOTE
- Continue anticoagulation (apixaban outpatient), heparin inpatient  - Continue metoprolol succinate 12.5mg qd

## 2024-02-25 NOTE — PLAN OF CARE
[facility] - Cardiology Stepdown  Initial Discharge Assessment       Primary Care Provider: Anatoliy Colindres MD    Admission Diagnosis: Shortness of breath [R06.02]  CAD (coronary artery disease) [I25.10]  Chest pain, unspecified type [R07.9]    Admission Date: 2/23/2024  Expected Discharge Date: 2/26/2024    Transition of Care Barriers: (P) None    Payor: [employer] MANAGED MEDICARE / Plan: HU[employer] MEDICARE HMO / Product Type: Capitation /     Extended Emergency Contact Information  Primary Emergency Contact: Shea Phelps  Address: [address] [place] [address] [facility]  Home Phone: [phone]  Mobile Phone: [phone]  Relation: Spouse  Preferred language: English  Secondary Emergency Contact: Mary Becerra  Address: [address] [place] [address] [facility]  Home Phone: [phone]  Mobile Phone: [phone]  Relation: Daughter    Discharge Plan A: (P) Home with family  Discharge Plan B: (P) Home      [id number] - [place], [place] - [address]  [address]  [facility] [address]  Phone: [phone] Fax: [phone]      Initial Assessment (most recent)       Adult Discharge Assessment - 02/25/24 1527          Discharge Assessment    Assessment Type Discharge Planning Assessment (P)      Confirmed/corrected address, phone number and insurance Yes (P)      Confirmed Demographics Correct on Facesheet (P)      Source of Information patient;family (P)      When was your last doctors appointment? -- (P)    Last Month.    Communicated RAMU with patient/caregiver Date not available/Unable to determine (P)      Reason For Admission NSTEMI (P)      People in Home spouse (P)      Do you expect to return to your current living situation? Yes (P)      Do you have help at home or someone to help you manage your care at home? Yes (P)      Who are your caregiver(s) and their phone number(s)? Spouse, Shea Phelps (ayush)  [phone] (P)      Prior to hospitilization cognitive status: Alert/Oriented (P)      Current cognitive status: Alert/Oriented (P)      Walking or Climbing Stairs Difficulty no (P)      Dressing/Bathing Difficulty no (P)      Home Accessibility wheelchair accessible (P)      Equipment Currently Used at Home none (P)      Readmission within 30 days? Yes (P)      Patient currently being followed by outpatient case management? No (P)      Do you currently have service(s) that help you manage your care at home? No (P)      Do you take prescription medications? Yes (P)      Do you have prescription coverage? Yes (P)      Coverage [employer] (P)      Do you have any problems affording any of your prescribed medications? No (P)      Is the patient taking medications as prescribed? yes (P)      Who is going to help you get home at discharge? Spouse (P)      How do you get to doctors appointments? car, drives self;family or friend will provide (P)      Are you on dialysis? No (P)    Beginning Dialysis.    Do you take coumadin? No (P)      Discharge Plan A Home with family (P)      Discharge Plan B Home (P)      DME Needed Upon Discharge  none (P)      Discharge Plan discussed with: Patient;Spouse/sig other (P)      Name(s) and Number(s) Spouse, Shea Phelps (c) [phone] (P)      Transition of Care Barriers None (P)         Physical Activity    On average, how many days per week do you engage in moderate to strenuous exercise (like a brisk walk)? 0 days (P)      On average, how many minutes do you engage in exercise at this level? 0 min (P)         Financial Resource Strain    How hard is it for you to pay for the very basics like food, housing, medical care, and heating? Somewhat hard (P)         Housing Stability    In the last 12 months, was there a time when you were not able to pay the mortgage or rent on time? No (P)      In the last 12 months, how many places have you lived? 1 (P)      In the last 12  months, was there a time when you did not have a steady place to sleep or slept in a shelter (including now)? No (P)         Transportation Needs    In the past 12 months, has lack of transportation kept you from medical appointments or from getting medications? No (P)      In the past 12 months, has lack of transportation kept you from meetings, work, or from getting things needed for daily living? No (P)         Food Insecurity    Within the past 12 months, you worried that your food would run out before you got the money to buy more. Never true (P)      Within the past 12 months, the food you bought just didn't last and you didn't have money to get more. Never true (P)         Stress    Do you feel stress - tense, restless, nervous, or anxious, or unable to sleep at night because your mind is troubled all the time - these days? Only a little (P)         Social Connections    In a typical week, how many times do you talk on the phone with family, friends, or neighbors? More than three times a week (P)      How often do you get together with friends or relatives? More than three times a week (P)      How often do you attend Spiritism or Restoration services? More than 4 times per year (P)      Do you belong to any clubs or organizations such as Spiritism groups, unions, fraternal or athletic groups, or school groups? No (P)      How often do you attend meetings of the clubs or organizations you belong to? Never (P)      Are you , , , , never , or living with a partner?  (P)         Alcohol Use    Q1: How often do you have a drink containing alcohol? Never (P)      Q2: How many drinks containing alcohol do you have on a typical day when you are drinking? Patient does not drink (P)      Q3: How often do you have six or more drinks on one occasion? Never (P)         OTHER    Name(s) of People in Home Spouse (P)                       SW met with pt and spouse, Shea at bedside to  complete Initial Discharge Planning Assessment. Pt. and spouse live in their home in [place], [place]. The home is a single story home with no steps/stairs to enter. No DME, Home Health, Dialysis or Coumadin. SW noting pt. will be a new start to dialysis following 2 failed transplants. Pt. Requesting [facility] or [facility] clinics which are closer to pt's home. Pt. received his first dialysis treatment yesterday and is anticipating dialysis tomorrow. Pt. drives, but spouse will provide transportation home upon discharge. SW attempting to include dialysis liaison, Jeanna Rodriguez in a Secure Chat, but she will be out of the office until Tuesday, 2/27/24.     Discharge Plan A and Plan B have been determined by review of patient's clinical status, future medical and therapeutic needs, and coverage/benefits for post-acute care in coordination with multidisciplinary team members.     Daylin Pierre LMSW

## 2024-02-25 NOTE — ASSESSMENT & PLAN NOTE
BMP reviewed- noted Estimated Creatinine Clearance: 11.9 mL/min (A) (based on SCr of 6.6 mg/dL (H)). according to latest data. Based on current GFR, CKD is end stage.  Monitor UOP and serial BMP and adjust therapy as needed. Renally dose meds. Avoid nephrotoxic medications and procedures.  - Started hemodialysis and 2L removed  - Nephrology following

## 2024-02-25 NOTE — SUBJECTIVE & OBJECTIVE
Interval History: No chest pain, troponin 13. Fluid status improving with hemodialysis.     Review of Systems   Constitutional: Negative.   HENT: Negative.     Cardiovascular:  Negative for chest pain, claudication, cyanosis, dyspnea on exertion, irregular heartbeat, leg swelling, near-syncope, orthopnea, palpitations and paroxysmal nocturnal dyspnea.   Respiratory: Negative.     Endocrine: Negative.    Musculoskeletal: Negative.    Gastrointestinal: Negative.    Genitourinary: Negative.    Neurological: Negative.      Objective:     Vital Signs (Most Recent):  Temp: 98 °F (36.7 °C) (02/25/24 1550)  Pulse: 68 (02/25/24 1550)  Resp: 18 (02/25/24 1550)  BP: (!) 122/58 (02/25/24 1550)  SpO2: 95 % (02/25/24 1550) Vital Signs (24h Range):  Temp:  [97.7 °F (36.5 °C)-98.4 °F (36.9 °C)] 98 °F (36.7 °C)  Pulse:  [57-73] 68  Resp:  [16-18] 18  SpO2:  [88 %-97 %] 95 %  BP: (122-156)/(58-70) 122/58     Weight: 88.3 kg (194 lb 10.7 oz)  Body mass index is 25.68 kg/m².     SpO2: 95 %         Intake/Output Summary (Last 24 hours) at 2/25/2024 1721  Last data filed at 2/25/2024 1237  Gross per 24 hour   Intake 710 ml   Output 770 ml   Net -60 ml       Lines/Drains/Airways       Peripheral Intravenous Line  Duration                  Hemodialysis AV Fistula 02/19/24 0924 Left upper arm 6 days         Peripheral IV - Single Lumen 02/23/24 0559 18 G Posterior;Right Hand 2 days         Peripheral IV - Single Lumen 02/23/24 0802 20 G Anterior;Right Forearm 2 days                       Telemetry: NSR 68bpm,  no acute events overnight but not continuous monitoring.     Physical Exam  Vitals and nursing note reviewed.   Constitutional:       General: He is not in acute distress.     Appearance: Normal appearance. He is normal weight. He is not ill-appearing or diaphoretic.   HENT:      Head: Normocephalic and atraumatic.   Eyes:      Pupils: Pupils are equal, round, and reactive to light.   Cardiovascular:      Rate and Rhythm: Normal rate  and regular rhythm.      Pulses: Normal pulses.      Heart sounds: Normal heart sounds.      Comments: JVD (+)  Pulmonary:      Effort: Pulmonary effort is normal.      Breath sounds: Normal breath sounds.   Abdominal:      General: Abdomen is flat. Bowel sounds are normal. There is no distension.      Palpations: Abdomen is soft. There is no mass.      Tenderness: There is no abdominal tenderness.   Musculoskeletal:         General: Normal range of motion.   Skin:     General: Skin is warm.      Capillary Refill: Capillary refill takes less than 2 seconds.   Neurological:      General: No focal deficit present.      Mental Status: He is alert and oriented to person, place, and time.            Significant Labs:     Recent Labs   Lab 02/24/24  0533 02/25/24  0300 02/25/24  1204   WBC 7.84 8.43 8.92   HGB 7.0* 7.5* 7.2*   HCT 21.7* 23.4* 22.6*   * 128* 114*       Recent Labs   Lab 02/23/24  1442 02/24/24  0533 02/25/24  0300    139 141   K 4.3 4.5 3.8    108 106   CO2 17* 20* 22*   BUN 64* 68* 56*   CREATININE 8.2* 8.1* 6.6*   CALCIUM 8.8 7.8* 8.0*   PHOS 4.9* 5.1* 3.7       Recent Labs   Lab 02/23/24  0609   ALKPHOS 55   BILITOT 0.5   PROT 6.1   ALT 19   AST 7*     Recent Labs   Lab 02/23/24  0609 02/23/24  1442 02/25/24  0825   TROPONINI 0.080* 4.205* 13.243*     Significant Imaging:     EKG 02/25/2024  - NSR with PACs, nonspecific ST-T wave changes    EKG 02/23/2024  - NSR with sinus arrhythmia, LVH with repolarization abnormality, prolonged QT    9/13/23 PET     There is a small sized, mild intensity apical inferior and inferoseptal reversible perfusion abnormality involving 6% of LV myocardium indicative of focal coronary stenosis.    Within the perfusion abnormality, absolute myocardial perfusion (cc/min/gm) averaged 0.56 cc/min/g at rest, 0.71 cc/min/g at stress and CFR was 1.27 , which equates to severely reduced coronary flow capacity within the LAD territory.    There are no other  significant perfusion abnormalities.    The whole heart absolute myocardial perfusion values averaged 0.74 cc/min/g at rest, which is normal; 1.17 cc/min/g at stress, which is mildly reduced; and CFR is 1.59 , which is mildly reduced.    CT attenuation images demonstrate severe diffuse coronary calcifications in the LAD, and moderate diffuse coronary calcifications in the LCX and RCA territory and mild diffuse aortic calcifications in the descending aorta.    The gated perfusion images showed an ejection fraction of 53% at rest and 56% during stress. A normal ejection fraction is greater than 47%.    The wall motion is normal at rest and during stress.    The LV cavity size is mildly enlarged at rest and stress.    The ECG portion of the study is abnormal but not diagnostic due to resting ST-T abnormalities.    There were no arrhythmias during stress.    The patient reported chest pain during the stress test.    There are no prior studies for comparison.        11/24/2023 TTE    Left Ventricle: The left ventricle is normal in size. Ventricular mass is normal. Normal wall thickness. There is concentric remodeling. Normal wall motion. There is normal systolic function with a visually estimated ejection fraction of 55 - 60%. Biplane (2D) method of discs ejection fraction is 55%. There is diastolic dysfunction. Elevated left ventricular filling pressure.    Right Ventricle: Normal right ventricular cavity size. Systolic function is normal.    Left Atrium: Left atrium is moderately dilated.    Aortic Valve: There is moderate aortic valve sclerosis. Moderately restricted motion. There is mild to moderate stenosis. Aortic valve area by VTI is 1.34 cm². Aortic valve peak velocity is 2.92 m/s. Mean gradient is 18 mmHg. The dimensionless index is 0.41. There is mild to moderate aortic regurgitation.    Mitral Valve: There is moderate posterior mitral annular calcification present. There is mild regurgitation.    Tricuspid  Valve: There is mild regurgitation.    Pulmonic Valve: There is mild regurgitation.    Pulmonary Artery: The estimated pulmonary artery systolic pressure is 45 mmHg.    IVC/SVC: Intermediate venous pressure at 8 mmHg.

## 2024-02-25 NOTE — ASSESSMENT & PLAN NOTE
Chronic, controlled. Latest blood pressure and vitals reviewed-     Temp:  [97 °F (36.1 °C)-98.4 °F (36.9 °C)]   Pulse:  [53-73]   Resp:  [16-18]   BP: (134-156)/(60-76)   SpO2:  [88 %-97 %] .   Home meds for hypertension were reviewed and noted below.   Hypertension Medications               doxazosin (CARDURA) 4 MG tablet Take 1 tablet (4 mg total) by mouth every 12 (twelve) hours.    furosemide (LASIX) 80 MG tablet Take 1 tablet (80 mg total) by mouth 2 (two) times a day.    hydrALAZINE (APRESOLINE) 50 MG tablet TAKE 1 TABLET (50 MG TOTAL) BY MOUTH 3 (THREE) TIMES DAILY.    isosorbide mononitrate (IMDUR) 30 MG 24 hr tablet Take 4 tablets (120 mg total) by mouth once daily.    metoprolol succinate (TOPROL-XL) 25 MG 24 hr tablet Take 0.5 tablets (12.5 mg total) by mouth once daily.    NIFEdipine (PROCARDIA-XL) 60 MG (OSM) 24 hr tablet Take 1 tablet (60 mg total) by mouth 2 (two) times a day.    nitroGLYCERIN (NITROSTAT) 0.4 MG SL tablet Place 1 tablet (0.4 mg total) under the tongue every 5 (five) minutes as needed for Chest pain.            While in the hospital, will manage blood pressure as follows; Continue home antihypertensive regimen    Will utilize p.r.n. blood pressure medication only if patient's blood pressure greater than 180/110 and he develops symptoms such as worsening chest pain or shortness of breath.

## 2024-02-25 NOTE — PROGRESS NOTES
[facility] - Cardiology [facility] Medicine  Progress Note    Patient Name: Ibrahima Phelps  MRN: [id number]  Patient Class: IP- Inpatient   Admission Date: 2/23/2024  Length of Stay: 2 days  Attending Physician: Yamil Silva DO  Primary Care Provider: Anatoliy Colindres MD        Subjective:     Principal Problem:NSTEMI (non-ST elevated myocardial infarction)        HPI:  Mr. Phelps is a a 69 yo male with a PMHx of paroxysmal atrial fibrillation, tachy-loan syndrome, HFpEF with most recent LVEF 65%, CAD s/p PCI 2006, HTN, HLD, aortic atherosclerosis, secondary hyperparathyroidism, diverticulosis, COPD, GERD, hypothyroidism, gout, a history of tobacco use, obesity, ESRD s/p kidney transplant x2 (1992, 2005) on chronic immunosuppressive medication who presents with chest pain.      Recent admission and discharge 2/21/24. Syncope after vascular procedure. No syncopal episodes or events on telemetry were noted after admission. IV Lasix was given noting BNP > 2000 and overload on exam.     In the ED his HR was in 60-70s, -180s. Plts 121. K 2.9, Cr 7.8. Trop .08. BNP 1929. LA 1.71. CXr with pulmonary edema and pleural effusion on the right. ECF with AF with RVR with ST depressions in inferior leads and non-specific changes. Pt complaining of crushing chest pain which started this AM around 3 and continued until he got here. PT given Nitro in ED x3 and morphine twice. Pt noted chest pain is improved on my evaluation and he is chest pain free.     Overview/Hospital Course:  History of ESRD status post renal transplant x2 initially presented for chest pain, NSTEMI and status post ACS protocol.  Admitted to CCU, LHC was planned.  Likely noncardiac in nature per cardiology team.  Had been hypervolemic on exam, likely requiring dialysis. CXR with pulmonary edema and increased size of small right pleural effusion. Fistula had previously created on 12/2023 with plan for HD in the future.  Was recently discharged on  02/21, HD recommended by KTM team however pt had declined. KTM currently on board and discussing HD with patient. Dizinaing for now.    Deemed appropriate for transfer to the floor on 2/24/2024, and was accepted to  team B for further care and management.    Interval History:  Seen this a.m. at bedside.  Denies any chest pain, does endorse congestion.  Reports of confusion overnight, however patient and spouse state that symptoms have resolved and attributed to lack of sleep.  Denies shortness or breath    Review of Systems  Objective:     Vital Signs (Most Recent):  Temp: 98.3 °F (36.8 °C) (02/25/24 1114)  Pulse: 70 (02/25/24 1114)  Resp: 17 (02/25/24 1114)  BP: (!) 142/65 (02/25/24 1114)  SpO2: 95 % (02/25/24 1114) Vital Signs (24h Range):  Temp:  [97 °F (36.1 °C)-98.4 °F (36.9 °C)] 98.3 °F (36.8 °C)  Pulse:  [53-73] 70  Resp:  [16-18] 17  SpO2:  [88 %-97 %] 95 %  BP: (134-156)/(60-76) 142/65     Weight: 88.3 kg (194 lb 10.7 oz)  Body mass index is 25.68 kg/m².    Intake/Output Summary (Last 24 hours) at 2/25/2024 1142  Last data filed at 2/25/2024 1137  Gross per 24 hour   Intake 1052 ml   Output 3123 ml   Net -2071 ml         Physical Exam  Constitutional:       General: He is not in acute distress.     Appearance: He is ill-appearing. He is not toxic-appearing.   HENT:      Head: Normocephalic and atraumatic.   Eyes:      Pupils: Pupils are equal, round, and reactive to light.   Cardiovascular:      Rate and Rhythm: Normal rate and regular rhythm.      Pulses: Normal pulses.   Pulmonary:      Effort: Pulmonary effort is normal. No respiratory distress.      Breath sounds: Rales present. No wheezing or rhonchi.   Abdominal:      General: Abdomen is flat. Bowel sounds are normal. There is no distension.      Palpations: Abdomen is soft.      Tenderness: There is no abdominal tenderness.   Musculoskeletal:         General: Normal range of motion.      Cervical back: Normal range of motion.      Right lower leg:  Edema present.      Left lower leg: Edema present.   Skin:     General: Skin is warm and dry.   Neurological:      General: No focal deficit present.      Mental Status: He is alert. Mental status is at baseline.   Psychiatric:         Mood and Affect: Mood normal.         Behavior: Behavior normal.         Thought Content: Thought content normal.             Significant Labs: All pertinent labs within the past 24 hours have been reviewed.    Significant Imaging: I have reviewed all pertinent imaging results/findings within the past 24 hours.    Assessment/Plan:      * NSTEMI (non-ST elevated myocardial infarction)  -initially presenting with chest pain, although attributed to increased LVEDP/volume overload  -No active chest pain, hemodynamically stable.  EKG on admission without ischemic changes  -completed 48hr heparin ACS protocol, heparin not previously trended, repeat obtained this a.m. markedly elevated at 13  -s/p [facility] apparently on 2/23 however findings unknown as final report not in EMR  Plan:  -restart heparin ggt  -contacted Cardiology  -obtain TTE  -continue to monitor on telemetry      ESRD (end stage renal disease)  Creatine stable for now. BMP reviewed- noted Estimated Creatinine Clearance: 9.7 mL/min (A) (based on SCr of 8.1 mg/dL (H)). according to latest data. Based on current GFR, CKD stage is stage 5 - GFR < 15.  Monitor UOP and serial BMP and adjust therapy as needed. Renally dose meds. Avoid nephrotoxic medications and procedures.  -Fistula recently created 12/18/23 (unresponsive during recent outpatient fistulogram leading to previous hospitalization)  -Volume overloaded on exam, renal failure worsening. Previously ESRD s/p transplant x2, now progressed back to ESRD  -KTM discussing HD with patient, pt now agreeable. S/p first session on 2/24    Phosphorus metabolism disorder  Sevelamer      Failed kidney transplant        Acute on chronic diastolic heart failure        Chronic heart failure  with preserved ejection fraction  -Not in exacerbation, volume overload likely due to ESRD  -TTE ordered  -HD as per schedule      Paroxysmal atrial fibrillation  Patient with Paroxysmal (<7 days) atrial fibrillation which is controlled currently with Beta Blocker and Amiodarone. Patient is currently in sinus rhythm.[id number] Score: 2. HASBLED Score: . Anticoagulation indicated. Anticoagulation done with heparin .    H/O kidney transplant  Failed renal transplant, now ESRD  KTM following      Mixed hyperlipidemia  ASA, statin      Primary hypertension  Chronic, controlled. Latest blood pressure and vitals reviewed-     Temp:  [97 °F (36.1 °C)-98.4 °F (36.9 °C)]   Pulse:  [53-73]   Resp:  [16-18]   BP: (134-156)/(60-76)   SpO2:  [88 %-97 %] .   Home meds for hypertension were reviewed and noted below.   Hypertension Medications               doxazosin (CARDURA) 4 MG tablet Take 1 tablet (4 mg total) by mouth every 12 (twelve) hours.    furosemide (LASIX) 80 MG tablet Take 1 tablet (80 mg total) by mouth 2 (two) times a day.    hydrALAZINE (APRESOLINE) 50 MG tablet TAKE 1 TABLET (50 MG TOTAL) BY MOUTH 3 (THREE) TIMES DAILY.    isosorbide mononitrate (IMDUR) 30 MG 24 hr tablet Take 4 tablets (120 mg total) by mouth once daily.    metoprolol succinate (TOPROL-XL) 25 MG 24 hr tablet Take 0.5 tablets (12.5 mg total) by mouth once daily.    NIFEdipine (PROCARDIA-XL) 60 MG (OSM) 24 hr tablet Take 1 tablet (60 mg total) by mouth 2 (two) times a day.    nitroGLYCERIN (NITROSTAT) 0.4 MG SL tablet Place 1 tablet (0.4 mg total) under the tongue every 5 (five) minutes as needed for Chest pain.            While in the hospital, will manage blood pressure as follows; Continue home antihypertensive regimen    Will utilize p.r.n. blood pressure medication only if patient's blood pressure greater than 180/110 and he develops symptoms such as worsening chest pain or shortness of breath.      VTE Risk Mitigation (From admission,  onward)           Ordered     heparin 25,000 units in dextrose 5% (100 units/ml) IV bolus from bag LOW INTENSITY nomogram - [facility]  As needed (PRN)        Question:  Heparin Infusion Adjustment (DO NOT MODIFY ANSWER)  Answer:  [email or web address]\epic\Images\Pharmacy\HeparinInfusions\heparin LOW INTENSITY nomogram for [facility] [id number]    02/25/24 1129     heparin 25,000 units in dextrose 5% (100 units/ml) IV bolus from bag LOW INTENSITY nomogram - [facility]  As needed (PRN)        Question:  Heparin Infusion Adjustment (DO NOT MODIFY ANSWER)  Answer:  [email or web address]\epic\Images\Pharmacy\HeparinInfusions\heparin LOW INTENSITY nomogram for [facility] [id number].pdf    02/25/24 1129     heparin 25,000 units in dextrose 5% 250 mL (100 units/mL) infusion LOW INTENSITY nomogram - [facility]  Continuous        Question:  Begin at (units/kg/hr)  Answer:  12    02/25/24 1129     IP VTE HIGH RISK PATIENT  Once         02/23/24 1122     Place sequential compression device  Until discontinued         02/23/24 1122                    Discharge Planning   RAMU: 2/26/2024     Code Status: Full Code   Is the patient medically ready for discharge?: No    Reason for patient still in hospital (select all that apply): Patient trending condition                     Yamil Silva DO  Department of Hospital Medicine   [facility] - Cardiology Stepdown

## 2024-02-25 NOTE — ASSESSMENT & PLAN NOTE
- Patient initially admitted with crushing chest pain, Trop elevated to 0.08, and ECG with ST depressions in inferior leads and dynamic changes.   - Bedside ECHO without WMA  - LHC was planned, but before procedure patient was not really sure about risks of kidney failure  - He was also volume overload and refusing hemodialysis at that time.  - Now patient had 2L of fluid removal with HD, and he has no chest pain, SOB, or anginal equivalent.    Recommendations  - Discussion with patient and nephrology about risks of intervention and contrast use, in the setting of kidney damage  - Continue treatment with ASA, atorvastatin 80mg qd, metoprolol succinate 12.5mg qd  - Continue heparin gtt  - Consult interventional cardiology tomorrow   -

## 2024-02-25 NOTE — SUBJECTIVE & OBJECTIVE
Interval History:  Seen this a.m. at bedside.  Denies any chest pain, does endorse congestion.  Reports of confusion overnight, however patient and spouse state that symptoms have resolved and attributed to lack of sleep.  Denies shortness or breath    Review of Systems  Objective:     Vital Signs (Most Recent):  Temp: 98.3 °F (36.8 °C) (02/25/24 1114)  Pulse: 70 (02/25/24 1114)  Resp: 17 (02/25/24 1114)  BP: (!) 142/65 (02/25/24 1114)  SpO2: 95 % (02/25/24 1114) Vital Signs (24h Range):  Temp:  [97 °F (36.1 °C)-98.4 °F (36.9 °C)] 98.3 °F (36.8 °C)  Pulse:  [53-73] 70  Resp:  [16-18] 17  SpO2:  [88 %-97 %] 95 %  BP: (134-156)/(60-76) 142/65     Weight: 88.3 kg (194 lb 10.7 oz)  Body mass index is 25.68 kg/m².    Intake/Output Summary (Last 24 hours) at 2/25/2024 1142  Last data filed at 2/25/2024 1137  Gross per 24 hour   Intake 1052 ml   Output 3123 ml   Net -2071 ml         Physical Exam  Constitutional:       General: He is not in acute distress.     Appearance: He is ill-appearing. He is not toxic-appearing.   HENT:      Head: Normocephalic and atraumatic.   Eyes:      Pupils: Pupils are equal, round, and reactive to light.   Cardiovascular:      Rate and Rhythm: Normal rate and regular rhythm.      Pulses: Normal pulses.   Pulmonary:      Effort: Pulmonary effort is normal. No respiratory distress.      Breath sounds: Rales present. No wheezing or rhonchi.   Abdominal:      General: Abdomen is flat. Bowel sounds are normal. There is no distension.      Palpations: Abdomen is soft.      Tenderness: There is no abdominal tenderness.   Musculoskeletal:         General: Normal range of motion.      Cervical back: Normal range of motion.      Right lower leg: Edema present.      Left lower leg: Edema present.   Skin:     General: Skin is warm and dry.   Neurological:      General: No focal deficit present.      Mental Status: He is alert. Mental status is at baseline.   Psychiatric:         Mood and Affect: Mood  normal.         Behavior: Behavior normal.         Thought Content: Thought content normal.             Significant Labs: All pertinent labs within the past 24 hours have been reviewed.    Significant Imaging: I have reviewed all pertinent imaging results/findings within the past 24 hours.

## 2024-02-25 NOTE — PLAN OF CARE
AAOX4,VSS,O2 sats>90% on room air with occasional need for 2L NC. Plan of care discussed with patient and wife. Patient has no complaints of pain/SOB. Discussed medications and care. Patient has no questions at this time.Pt visualised and stable.Call light within reach.Pt resting,bed at lowest position.    Problem: Adult Inpatient Plan of Care  Goal: Plan of Care Review  Outcome: Ongoing, Progressing  Goal: Patient-Specific Goal (Individualized)  Outcome: Ongoing, Progressing  Goal: Absence of Hospital-Acquired Illness or Injury  Outcome: Ongoing, Progressing  Goal: Optimal Comfort and Wellbeing  Outcome: Ongoing, Progressing  Goal: Readiness for Transition of Care  Outcome: Ongoing, Progressing     Problem: Fall Injury Risk  Goal: Absence of Fall and Fall-Related Injury  Outcome: Ongoing, Progressing     Problem: Device-Related Complication Risk (Hemodialysis)  Goal: Safe, Effective Therapy Delivery  Outcome: Ongoing, Progressing     Problem: Hemodynamic Instability (Hemodialysis)  Goal: Effective Tissue Perfusion  Outcome: Ongoing, Progressing     Problem: Infection (Hemodialysis)  Goal: Absence of Infection Signs and Symptoms  Outcome: Ongoing, Progressing

## 2024-02-25 NOTE — ASSESSMENT & PLAN NOTE
Patient with Paroxysmal (<7 days) atrial fibrillation which is controlled currently with Beta Blocker and Amiodarone. Patient is currently in sinus rhythm.[id number] Score: 2. HASBLED Score: . Anticoagulation indicated. Anticoagulation done with heparin .

## 2024-02-25 NOTE — ASSESSMENT & PLAN NOTE
Elevated troponin - Myocardial injury  - Elevation of troponin are specific for cardiomyocyte damage, but does not identify the cause of it  - For a diagnosis of myocardial infarction, the fourth international definition of MI require: symptoms of ischemia, and ECG or imaging findings consistent with ischemia, or biomarkers  - Patient without symptoms of ischemia, EKG without ST-changes consistent with ischemia and known volume overload which likely causing increased Myocardial oxygen demand     - No indications for ACS protocol  - Treat underlying insult: Hemodialysis  - Initiate ASCVD prevention

## 2024-02-25 NOTE — SUBJECTIVE & OBJECTIVE
"Past Medical History:   Diagnosis Date    Atrial fibrillation     CAD (coronary artery disease) 2006    MI in 2006    CHF (congestive heart failure) 2017    CKD (chronic kidney disease) stage 3, GFR 30-59 ml/min     Dr. Latif.  in transplanted kidney    CKD (chronic kidney disease) stage 5, GFR less than 15 ml/min 02/02/2024    COVID-19 02/07/2023    Diverticulosis     Hyperlipidemia     Hypertension     Keloid cicatrix     Metabolic bone disease     Other emphysema 10/01/2019    Pericarditis     S/P kidney transplant 1992    x2 (1992 & 2005),    Thrombocytopenia     Thyroid disease     Tobacco use 09/05/2014       Past Surgical History:   Procedure Laterality Date    AV FISTULA PLACEMENT Left 12/18/2023    Procedure: CREATION, AV FISTULA;  Surgeon: JUANI Melendez III, MD;  [facility];  Service: Vascular;  Laterality: Left;  LUE AVF creation vs AVG insertion    CARDIOVERSION  04/30/15    CHOLECYSTECTOMY      COLONOSCOPY  April 20, 2011    Diverticulosis, repeat recommended in 3 yrs., repeat colonoscopy 2014 revealed 2 polyps.  He should return in 5 years.    COLONOSCOPY N/A 3/13/2020    Procedure: COLONOSCOPY;  Surgeon: Chon Casper MD;  Location: [facility] (4TH FLR);  Service: Endoscopy;  Laterality: N/A;  ok to hold coumadin x5days-see telephone encounter 2/4/20-tb    COLONOSCOPY N/A 2/4/2021    Procedure: COLONOSCOPY;  Surgeon: Chon Casper MD;  Location: [facility] (4TH FLR);  Service: Endoscopy;  Laterality: N/A;  Eliquis - per Dr. GAVIN Blunt ok to hold x 2 days and "restarted asap"- ERW  last prep "poor", [id number] ordered/ Pt requests Dr. Casper  prep ins. mailed - COVID screening on 2/1/21 PCW- ERW    COLONOSCOPY N/A 3/3/2021    Procedure: COLONOSCOPY;  Surgeon: Chon Casper MD;  Location: [facility] (4TH FLR);  Service: Endoscopy;  Laterality: N/A;  Patient with his second poor bowel pre and has poor prep today.  If patient not intersted or can't get colonoscopy tomorrow will " need constipation bowel prep and will need to restart his Eliquis today.Thanks,Chon     per Dr Lopez to hold Eliquis 2 days prior      COVID     CORONARY ANGIOPLASTY WITH STENT PLACEMENT  9/13/2006    FISTULOGRAM N/A 2/19/2024    Procedure: Fistulogram;  Surgeon: JUANI Melendez III, MD;  Location: [facility] CATH LAB;  Service: Peripheral Vascular;  Laterality: N/A;    IRRIGATION AND DEBRIDEMENT Right 8/30/2023    Procedure: IRRIGATION AND DEBRIDEMENT, RIGHT MIDDLE FINGER;  Surgeon: Martin Larsen MD;  Location: [facility] OR [facility] FLR;  Service: Orthopedics;  Laterality: Right;    KIDNEY TRANSPLANT  2005    PARATHYROIDECTOMY      PERCUTANEOUS TRANSLUMINAL ANGIOPLASTY OF ARTERIOVENOUS FISTULA Left 2/19/2024    Procedure: PTA, AV FISTULA;  Surgeon: JUANI Melendez III, MD;  Location: [facility] CATH LAB;  Service: Peripheral Vascular;  Laterality: Left;    TREATMENT OF CARDIAC ARRHYTHMIA N/A 3/28/2022    Procedure: CARDIOVERSION;  Surgeon: Migue Blunt MD;  Location: [facility] EP LAB;  Service: Cardiology;  Laterality: N/A;  AF, DCC, MAC, GP, 3 PREP*RODRIGO deferred pt 100% compliant*       Review of patient's allergies indicates:   Allergen Reactions    Ace inhibitors Swelling    Verapamil Other (See Comments)     Other reaction(s): Unknown    Povidone-iodine Itching       No current facility-administered medications on file prior to encounter.     Current Outpatient Medications on File Prior to Encounter   Medication Sig    acetaminophen (TYLENOL) 500 MG tablet Take 1 tablet (500 mg total) by mouth every 6 (six) hours as needed for Pain.    albuterol (VENTOLIN HFA) 90 mcg/actuation inhaler Inhale 2 puffs into the lungs every 6 (six) hours as needed for Wheezing or Shortness of Breath. Rescue    albuterol-ipratropium (DUO-NEB) 2.5 mg-0.5 mg/3 mL nebulizer solution Take 3 mLs by nebulization every 6 (six) hours as needed for Wheezing. Rescue. Can be used in place of albuterol inhaler.    amiodarone (PACERONE) 200 MG Tab Take  1 tablet (200 mg total) by mouth once daily.    apixaban (ELIQUIS) 5 mg Tab Take 1 tablet (5 mg total) by mouth 2 (two) times daily.    atorvastatin (LIPITOR) 40 MG tablet Take 2 tablets (80 mg total) by mouth once daily.    b complex vitamins (B COMPLEX-VITAMIN B12) tablet Take 1 tablet by mouth once daily.    calcium carbonate (CALCIUM 600 ORAL) Take 1 tablet by mouth 2 (two) times a day.    CHOLECALCIFEROL, VITAMIN D3, ORAL Take 2 tablets by mouth 2 (two) times daily.    clopidogreL (PLAVIX) 75 mg tablet Take 1 tablet (75 mg total) by mouth once daily.    doxazosin (CARDURA) 4 MG tablet Take 1 tablet (4 mg total) by mouth every 12 (twelve) hours.    famotidine (PEPCID) 20 MG tablet Take 1 tablet (20 mg total) by mouth 2 (two) times daily.    fexofenadine HCl (ALLEGRA ORAL) Take 1 tablet by mouth daily as needed (allergies).    fluticasone furoate-vilanteroL (BREO) 100-25 mcg/dose diskus inhaler Inhale 1 puff into the lungs once daily. Controller. Use this inhaler every day. (Patient not taking: Reported on 2/6/2024)    fluticasone propionate (FLONASE) 50 mcg/actuation nasal spray 1 spray (50 mcg total) by Each Nostril route daily as needed for Allergies.    FLUZONE HIGHDOSE QUAD 23-24  mcg/0.7 mL Syrg     furosemide (LASIX) 80 MG tablet Take 1 tablet (80 mg total) by mouth 2 (two) times a day.    gabapentin (NEURONTIN) 100 MG capsule Take 1 capsule (100 mg total) by mouth as needed (pain).    hydrALAZINE (APRESOLINE) 50 MG tablet TAKE 1 TABLET (50 MG TOTAL) BY MOUTH 3 (THREE) TIMES DAILY.    HYDROcodone-acetaminophen (NORCO) 5-325 mg per tablet Take 1 tablet by mouth every 6 (six) hours as needed for Pain.    isosorbide mononitrate (IMDUR) 30 MG 24 hr tablet Take 4 tablets (120 mg total) by mouth once daily.    metoprolol succinate (TOPROL-XL) 25 MG 24 hr tablet Take 0.5 tablets (12.5 mg total) by mouth once daily.    MITIGARE 0.6 mg Cap TAKE 1 CAPSULE BY MOUTH TWICE A DAY AS NEEDED GOUT FLARE UP TO 3 DAYS     multivitamin (THERAGRAN) per tablet Take 1 tablet by mouth Daily.    NIFEdipine (PROCARDIA-XL) 60 MG (OSM) 24 hr tablet Take 1 tablet (60 mg total) by mouth 2 (two) times a day.    nitroGLYCERIN (NITROSTAT) 0.4 MG SL tablet Place 1 tablet (0.4 mg total) under the tongue every 5 (five) minutes as needed for Chest pain. (Patient not taking: Reported on 2/6/2024)    omeprazole (PRILOSEC) 20 MG capsule Take 1 capsule (20 mg total) by mouth daily as needed (heartburn).    paricalcitoL (ZEMPLAR) 1 MCG capsule TAKE 1 CAPSULE ON MONDAY, WEDNESDAY AND FRIDAY    Conversion [facility],12Y UP,,PF, 30 mcg/0.3 mL injection Inject into the muscle.    potassium chloride (KLOR-CON) 10 MEQ TbSR Take 1 tablet (10 mEq total) by mouth daily as needed (to be taken with lasix).,    predniSONE (DELTASONE) 5 MG tablet Take 1 tablet (5 mg total) by mouth once daily.    pulse oximeter (PULSE OXIMETER) device by Apply Externally route 2 (two) times a day. Use twice daily at 8 AM and 3 PM and record the value in i.Sechart as directed.    sevelamer carbonate (RENVELA) 800 mg Tab Take 1 tablet (800 mg total) by mouth 3 (three) times daily with meals.    sodium polystyrene (KAYEXALATE) powder Take 60 mLs (15 g total) by mouth once daily.  (Using the teaspoon provided, scoop 4 level teaspoonsfuls, add 60 ml of water to powder.  Shake well and drink immediately.)    SPIKEVAX [id number],12Y UP,,PF, 50 mcg/0.5 mL injection     tacrolimus (PROGRAF) 1 MG Cap TAKE 3 CAPSULES (3 MG TOTAL) BY MOUTH EVERY 12 (TWELVE) HOURS.    tiotropium bromide (SPIRIVA RESPIMAT) 1.25 mcg/actuation inhaler Inhale 2 puffs into the lungs once daily. Controller. Use this inhaler every day.    traZODone (DESYREL) 50 MG tablet Take 1 tablet (50 mg total) by mouth nightly as needed for Insomnia.     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     Patient is currently retired   Substance and Sexual Activity    Alcohol use: No    Drug use: No    Sexual activity: Yes     Partners: Female     Review of Systems   Constitutional: Negative.   HENT: Negative.     Cardiovascular:  Negative for chest pain, claudication, cyanosis, dyspnea on exertion, irregular heartbeat, leg swelling, near-syncope, orthopnea, palpitations and paroxysmal nocturnal dyspnea.   Respiratory: Negative.     Endocrine: Negative.    Musculoskeletal: Negative.    Gastrointestinal: Negative.    Genitourinary: Negative.    Neurological: Negative.      Objective:     Vital Signs (Most Recent):  Temp: 98.3 °F (36.8 °C) (24 1114)  Pulse: 70 (24 1114)  Resp: 17 (24 1114)  BP: (!) 142/65 (24 1114)  SpO2: (!) 93 % (24 1123) Vital Signs (24h Range):  Temp:  [97.7 °F (36.5 °C)-98.4 °F (36.9 °C)] 98.3 °F (36.8 °C)  Pulse:  [57-73] 70  Resp:  [16-18] 17  SpO2:  [88 %-97 %] 93 %  BP: (135-156)/(65-70) 142/65     Weight: 88.3 kg (194 lb 10.7 oz)  Body mass index is 25.68 kg/m².    SpO2: (!) 93 %         Intake/Output Summary (Last 24 hours) at 2024 1520  Last data filed at 2024 1237  Gross per 24 hour   Intake 1052 ml   Output 770 ml   Net 282 ml       Lines/Drains/Airways       Peripheral Intravenous Line  Duration                  Hemodialysis AV Fistula 24 0924 Left upper arm 6 days         Peripheral IV - Single Lumen 24 0559 18 G Posterior;Right Hand 2 days         Peripheral IV - Single Lumen 24 0802 20 G  Anterior;Right Forearm 2 days                     Telemetry: NSR with HR 77.    Physical Exam  Vitals and nursing note reviewed.   Constitutional:       General: He is not in acute distress.     Appearance: Normal appearance. He is normal weight. He is not ill-appearing or diaphoretic.   HENT:      Head: Normocephalic and atraumatic.   Eyes:      Pupils: Pupils are equal, round, and reactive to light.   Cardiovascular:      Rate and Rhythm: Normal rate and regular rhythm.      Pulses: Normal pulses.      Heart sounds: Normal heart sounds.   Pulmonary:      Effort: Pulmonary effort is normal.      Breath sounds: Normal breath sounds.   Abdominal:      General: Abdomen is flat. Bowel sounds are normal. There is no distension.      Palpations: Abdomen is soft. There is no mass.      Tenderness: There is no abdominal tenderness.   Musculoskeletal:         General: Normal range of motion.   Skin:     General: Skin is warm.      Capillary Refill: Capillary refill takes less than 2 seconds.   Neurological:      General: No focal deficit present.      Mental Status: He is alert and oriented to person, place, and time.          Significant Labs:     Recent Labs   Lab 02/24/24  0533 02/25/24  0300 02/25/24  1204   WBC 7.84 8.43 8.92   HGB 7.0* 7.5* 7.2*   HCT 21.7* 23.4* 22.6*   * 128* 114*       Recent Labs   Lab 02/23/24  1442 02/24/24  0533 02/25/24  0300    139 141   K 4.3 4.5 3.8    108 106   CO2 17* 20* 22*   BUN 64* 68* 56*   CREATININE 8.2* 8.1* 6.6*   CALCIUM 8.8 7.8* 8.0*   PHOS 4.9* 5.1* 3.7       Recent Labs   Lab 02/23/24  0609   ALKPHOS 55   BILITOT 0.5   PROT 6.1   ALT 19   AST 7*       Recent Labs   Lab 02/23/24  0609 02/23/24  1442 02/25/24  0825   TROPONINI 0.080* 4.205* 13.243*     Significant Imaging:     TTE 11/24/2023    Left Ventricle: The left ventricle is normal in size. Ventricular mass is normal. Normal wall thickness. There is concentric remodeling. Normal wall motion. There is  normal systolic function with a visually estimated ejection fraction of 55 - 60%. Biplane (2D) method of discs ejection fraction is 55%. There is diastolic dysfunction. Elevated left ventricular filling pressure.    Right Ventricle: Normal right ventricular cavity size. Systolic function is normal.    Left Atrium: Left atrium is moderately dilated.    Aortic Valve: There is moderate aortic valve sclerosis. Moderately restricted motion. There is mild to moderate stenosis. Aortic valve area by VTI is 1.34 cm². Aortic valve peak velocity is 2.92 m/s. Mean gradient is 18 mmHg. The dimensionless index is 0.41. There is mild to moderate aortic regurgitation.    Mitral Valve: There is moderate posterior mitral annular calcification present. There is mild regurgitation.    Tricuspid Valve: There is mild regurgitation.    Pulmonic Valve: There is mild regurgitation.    Pulmonary Artery: The estimated pulmonary artery systolic pressure is 45 mmHg.    IVC/SVC: Intermediate venous pressure at 8 mmHg.      PET Stress 09/13/2023    There is a small sized, mild intensity apical inferior and inferoseptal reversible perfusion abnormality involving 6% of LV myocardium indicative of focal coronary stenosis.    Within the perfusion abnormality, absolute myocardial perfusion (cc/min/gm) averaged 0.56 cc/min/g at rest, 0.71 cc/min/g at stress and CFR was 1.27 , which equates to severely reduced coronary flow capacity within the LAD territory.    There are no other significant perfusion abnormalities.    The whole heart absolute myocardial perfusion values averaged 0.74 cc/min/g at rest, which is normal; 1.17 cc/min/g at stress, which is mildly reduced; and CFR is 1.59 , which is mildly reduced.    CT attenuation images demonstrate severe diffuse coronary calcifications in the LAD, and moderate diffuse coronary calcifications in the LCX and RCA territory and mild diffuse aortic calcifications in the descending aorta.    The gated  perfusion images showed an ejection fraction of 53% at rest and 56% during stress. A normal ejection fraction is greater than 47%.    The wall motion is normal at rest and during stress.    The LV cavity size is mildly enlarged at rest and stress.    The ECG portion of the study is abnormal but not diagnostic due to resting ST-T abnormalities.    There were no arrhythmias during stress.    The patient reported chest pain during the stress test.    There are no prior studies for comparison.

## 2024-02-26 PROBLEM — N18.5 ANEMIA OF CHRONIC KIDNEY FAILURE, STAGE 5: Status: ACTIVE | Noted: 2024-02-26

## 2024-02-26 PROBLEM — D63.1 ANEMIA OF CHRONIC KIDNEY FAILURE, STAGE 5: Status: ACTIVE | Noted: 2024-02-26

## 2024-02-26 LAB
ABO + RH BLD: NORMAL
ANION GAP SERPL CALC-SCNC: 13 MMOL/L (ref 8–16)
APTT PPP: 46 SEC (ref 21–32)
APTT PPP: 58.6 SEC (ref 21–32)
ASCENDING AORTA: 2.8 CM
AV INDEX (PROSTH): 0.47
AV MEAN GRADIENT: 14 MMHG
AV PEAK GRADIENT: 27 MMHG
AV VALVE AREA BY VELOCITY RATIO: 1.39 CM²
AV VALVE AREA: 1.62 CM²
AV VELOCITY RATIO: 0.4
BASOPHILS # BLD AUTO: 0 K/UL (ref 0–0.2)
BASOPHILS # BLD AUTO: 0 K/UL (ref 0–0.2)
BASOPHILS NFR BLD: 0 % (ref 0–1.9)
BASOPHILS NFR BLD: 0 % (ref 0–1.9)
BLD GP AB SCN CELLS X3 SERPL QL: NORMAL
BLD PROD TYP BPU: NORMAL
BLOOD UNIT EXPIRATION DATE: NORMAL
BLOOD UNIT TYPE CODE: 7300
BLOOD UNIT TYPE: NORMAL
BSA FOR ECHO PROCEDURE: 2.13 M2
BUN SERPL-MCNC: 64 MG/DL (ref 8–23)
CALCIUM SERPL-MCNC: 7.6 MG/DL (ref 8.7–10.5)
CHLORIDE SERPL-SCNC: 107 MMOL/L (ref 95–110)
CO2 SERPL-SCNC: 21 MMOL/L (ref 23–29)
CODING SYSTEM: NORMAL
CREAT SERPL-MCNC: 7 MG/DL (ref 0.5–1.4)
CROSSMATCH INTERPRETATION: NORMAL
CV ECHO LV RWT: 0.37 CM
DIFFERENTIAL METHOD BLD: ABNORMAL
DIFFERENTIAL METHOD BLD: ABNORMAL
DISPENSE STATUS: NORMAL
DOP CALC AO PEAK VEL: 2.62 M/S
DOP CALC AO VTI: 52.95 CM
DOP CALC LVOT AREA: 3.5 CM2
DOP CALC LVOT DIAMETER: 2.1 CM
DOP CALC LVOT PEAK VEL: 1.05 M/S
DOP CALC LVOT STROKE VOLUME: 85.85 CM3
DOP CALCLVOT PEAK VEL VTI: 24.8 CM
E WAVE DECELERATION TIME: 240.56 MSEC
E/A RATIO: 1.84
E/E' RATIO: 15.63 M/S
ECHO LV POSTERIOR WALL: 1 CM (ref 0.6–1.1)
EOSINOPHIL # BLD AUTO: 0 K/UL (ref 0–0.5)
EOSINOPHIL # BLD AUTO: 0.1 K/UL (ref 0–0.5)
EOSINOPHIL NFR BLD: 0.3 % (ref 0–8)
EOSINOPHIL NFR BLD: 2 % (ref 0–8)
ERYTHROCYTE [DISTWIDTH] IN BLOOD BY AUTOMATED COUNT: 17 % (ref 11.5–14.5)
ERYTHROCYTE [DISTWIDTH] IN BLOOD BY AUTOMATED COUNT: 17.7 % (ref 11.5–14.5)
EST. GFR  (NO RACE VARIABLE): 7.9 ML/MIN/1.73 M^2
FRACTIONAL SHORTENING: 27 % (ref 28–44)
GLUCOSE SERPL-MCNC: 75 MG/DL (ref 70–110)
HCT VFR BLD AUTO: 20 % (ref 40–54)
HCT VFR BLD AUTO: 26.9 % (ref 40–54)
HGB BLD-MCNC: 6.4 G/DL (ref 14–18)
HGB BLD-MCNC: 8.5 G/DL (ref 14–18)
IMM GRANULOCYTES # BLD AUTO: 0.02 K/UL (ref 0–0.04)
IMM GRANULOCYTES # BLD AUTO: 0.03 K/UL (ref 0–0.04)
IMM GRANULOCYTES NFR BLD AUTO: 0.3 % (ref 0–0.5)
IMM GRANULOCYTES NFR BLD AUTO: 0.5 % (ref 0–0.5)
INTERVENTRICULAR SEPTUM: 1 CM (ref 0.6–1.1)
LA MAJOR: 5.26 CM
LA MINOR: 6.18 CM
LA WIDTH: 4.7 CM
LEFT ATRIUM SIZE: 4.65 CM
LEFT ATRIUM VOLUME INDEX MOD: 44.8 ML/M2
LEFT ATRIUM VOLUME INDEX: 49.8 ML/M2
LEFT ATRIUM VOLUME MOD: 95 CM3
LEFT ATRIUM VOLUME: 105.57 CM3
LEFT INTERNAL DIMENSION IN SYSTOLE: 3.91 CM (ref 2.1–4)
LEFT VENTRICLE DIASTOLIC VOLUME INDEX: 65.78 ML/M2
LEFT VENTRICLE DIASTOLIC VOLUME: 139.45 ML
LEFT VENTRICLE MASS INDEX: 97 G/M2
LEFT VENTRICLE SYSTOLIC VOLUME INDEX: 31.2 ML/M2
LEFT VENTRICLE SYSTOLIC VOLUME: 66.14 ML
LEFT VENTRICULAR INTERNAL DIMENSION IN DIASTOLE: 5.37 CM (ref 3.5–6)
LEFT VENTRICULAR MASS: 204.82 G
LV LATERAL E/E' RATIO: 13.89 M/S
LV SEPTAL E/E' RATIO: 17.86 M/S
LYMPHOCYTES # BLD AUTO: 0.2 K/UL (ref 1–4.8)
LYMPHOCYTES # BLD AUTO: 1.2 K/UL (ref 1–4.8)
LYMPHOCYTES NFR BLD: 18 % (ref 18–48)
LYMPHOCYTES NFR BLD: 3.5 % (ref 18–48)
MAGNESIUM SERPL-MCNC: 1.9 MG/DL (ref 1.6–2.6)
MCH RBC QN AUTO: 25.6 PG (ref 27–31)
MCH RBC QN AUTO: 25.9 PG (ref 27–31)
MCHC RBC AUTO-ENTMCNC: 31.6 G/DL (ref 32–36)
MCHC RBC AUTO-ENTMCNC: 32 G/DL (ref 32–36)
MCV RBC AUTO: 80 FL (ref 82–98)
MCV RBC AUTO: 82 FL (ref 82–98)
MONOCYTES # BLD AUTO: 0.1 K/UL (ref 0.3–1)
MONOCYTES # BLD AUTO: 0.6 K/UL (ref 0.3–1)
MONOCYTES NFR BLD: 1.2 % (ref 4–15)
MONOCYTES NFR BLD: 8.6 % (ref 4–15)
MV PEAK A VEL: 0.68 M/S
MV PEAK E VEL: 1.25 M/S
MV STENOSIS PRESSURE HALF TIME: 69.76 MS
MV VALVE AREA P 1/2 METHOD: 3.15 CM2
NEUTROPHILS # BLD AUTO: 4.9 K/UL (ref 1.8–7.7)
NEUTROPHILS # BLD AUTO: 5.7 K/UL (ref 1.8–7.7)
NEUTROPHILS NFR BLD: 71.1 % (ref 38–73)
NEUTROPHILS NFR BLD: 94.5 % (ref 38–73)
NRBC BLD-RTO: 0 /100 WBC
NRBC BLD-RTO: 0 /100 WBC
NUM UNITS TRANS PACKED RBC: NORMAL
OHS QRS DURATION: 104 MS
OHS QTC CALCULATION: 457 MS
PHOSPHATE SERPL-MCNC: 3.2 MG/DL (ref 2.7–4.5)
PISA TR MAX VEL: 3.21 M/S
PLATELET # BLD AUTO: 114 K/UL (ref 150–450)
PLATELET # BLD AUTO: 114 K/UL (ref 150–450)
PMV BLD AUTO: ABNORMAL FL (ref 9.2–12.9)
PMV BLD AUTO: ABNORMAL FL (ref 9.2–12.9)
POTASSIUM SERPL-SCNC: 3.6 MMOL/L (ref 3.5–5.1)
PV PEAK GRADIENT: 44 MMHG
PV PEAK VELOCITY: 3.31 M/S
RA MAJOR: 5.76 CM
RA PRESSURE ESTIMATED: 8 MMHG
RA WIDTH: 4.82 CM
RBC # BLD AUTO: 2.5 M/UL (ref 4.6–6.2)
RBC # BLD AUTO: 3.28 M/UL (ref 4.6–6.2)
RIGHT ATRIAL AREA: 22 CM2
RIGHT VENTRICULAR END-DIASTOLIC DIMENSION: 4.21 CM
RV TB RVSP: 11 MMHG
SINUS: 3.61 CM
SODIUM SERPL-SCNC: 141 MMOL/L (ref 136–145)
SPECIMEN OUTDATE: NORMAL
STJ: 2.64 CM
TDI LATERAL: 0.09 M/S
TDI SEPTAL: 0.07 M/S
TDI: 0.08 M/S
TR MAX PG: 41 MMHG
TRICUSPID ANNULAR PLANE SYSTOLIC EXCURSION: 2.53 CM
TROPONIN I SERPL DL<=0.01 NG/ML-MCNC: 10.66 NG/ML (ref 0–0.03)
TV REST PULMONARY ARTERY PRESSURE: 49 MMHG
WBC # BLD AUTO: 6.07 K/UL (ref 3.9–12.7)
WBC # BLD AUTO: 6.85 K/UL (ref 3.9–12.7)
Z-SCORE OF LEFT VENTRICULAR DIMENSION IN END DIASTOLE: -2.21
Z-SCORE OF LEFT VENTRICULAR DIMENSION IN END SYSTOLE: -0.35

## 2024-02-26 PROCEDURE — 94761 N-INVAS EAR/PLS OXIMETRY MLT: CPT

## 2024-02-26 PROCEDURE — 86920 COMPATIBILITY TEST SPIN: CPT | Performed by: STUDENT IN AN ORGANIZED HEALTH CARE EDUCATION/TRAINING PROGRAM

## 2024-02-26 PROCEDURE — 63600175 PHARM REV CODE 636 W HCPCS

## 2024-02-26 PROCEDURE — 83735 ASSAY OF MAGNESIUM: CPT | Performed by: STUDENT IN AN ORGANIZED HEALTH CARE EDUCATION/TRAINING PROGRAM

## 2024-02-26 PROCEDURE — 25000003 PHARM REV CODE 250

## 2024-02-26 PROCEDURE — 93454 CORONARY ARTERY ANGIO S&I: CPT | Performed by: INTERNAL MEDICINE

## 2024-02-26 PROCEDURE — B2111ZZ FLUOROSCOPY OF MULTIPLE CORONARY ARTERIES USING LOW OSMOLAR CONTRAST: ICD-10-PCS | Performed by: INTERNAL MEDICINE

## 2024-02-26 PROCEDURE — 85025 COMPLETE CBC W/AUTO DIFF WBC: CPT | Performed by: STUDENT IN AN ORGANIZED HEALTH CARE EDUCATION/TRAINING PROGRAM

## 2024-02-26 PROCEDURE — 25000003 PHARM REV CODE 250: Performed by: STUDENT IN AN ORGANIZED HEALTH CARE EDUCATION/TRAINING PROGRAM

## 2024-02-26 PROCEDURE — 84100 ASSAY OF PHOSPHORUS: CPT | Performed by: STUDENT IN AN ORGANIZED HEALTH CARE EDUCATION/TRAINING PROGRAM

## 2024-02-26 PROCEDURE — P9016 RBC LEUKOCYTES REDUCED: HCPCS | Performed by: STUDENT IN AN ORGANIZED HEALTH CARE EDUCATION/TRAINING PROGRAM

## 2024-02-26 PROCEDURE — 36415 COLL VENOUS BLD VENIPUNCTURE: CPT | Mod: XB | Performed by: STUDENT IN AN ORGANIZED HEALTH CARE EDUCATION/TRAINING PROGRAM

## 2024-02-26 PROCEDURE — 84484 ASSAY OF TROPONIN QUANT: CPT | Performed by: STUDENT IN AN ORGANIZED HEALTH CARE EDUCATION/TRAINING PROGRAM

## 2024-02-26 PROCEDURE — 20600001 HC STEP DOWN PRIVATE ROOM

## 2024-02-26 PROCEDURE — 93454 CORONARY ARTERY ANGIO S&I: CPT | Mod: 26,,, | Performed by: INTERNAL MEDICINE

## 2024-02-26 PROCEDURE — 63600175 PHARM REV CODE 636 W HCPCS: Performed by: STUDENT IN AN ORGANIZED HEALTH CARE EDUCATION/TRAINING PROGRAM

## 2024-02-26 PROCEDURE — 25500020 PHARM REV CODE 255: Performed by: INTERNAL MEDICINE

## 2024-02-26 PROCEDURE — 63600175 PHARM REV CODE 636 W HCPCS: Performed by: INTERNAL MEDICINE

## 2024-02-26 PROCEDURE — C1894 INTRO/SHEATH, NON-LASER: HCPCS | Performed by: INTERNAL MEDICINE

## 2024-02-26 PROCEDURE — 85730 THROMBOPLASTIN TIME PARTIAL: CPT | Mod: 91 | Performed by: STUDENT IN AN ORGANIZED HEALTH CARE EDUCATION/TRAINING PROGRAM

## 2024-02-26 PROCEDURE — C1887 CATHETER, GUIDING: HCPCS | Performed by: INTERNAL MEDICINE

## 2024-02-26 PROCEDURE — 25000003 PHARM REV CODE 250: Performed by: INTERNAL MEDICINE

## 2024-02-26 PROCEDURE — 99152 MOD SED SAME PHYS/QHP 5/>YRS: CPT | Mod: ,,, | Performed by: INTERNAL MEDICINE

## 2024-02-26 PROCEDURE — 99233 SBSQ HOSP IP/OBS HIGH 50: CPT | Mod: GC,,, | Performed by: INTERNAL MEDICINE

## 2024-02-26 PROCEDURE — 63600175 PHARM REV CODE 636 W HCPCS: Mod: JZ,JG | Performed by: INTERNAL MEDICINE

## 2024-02-26 PROCEDURE — 86901 BLOOD TYPING SEROLOGIC RH(D): CPT | Performed by: STUDENT IN AN ORGANIZED HEALTH CARE EDUCATION/TRAINING PROGRAM

## 2024-02-26 PROCEDURE — 99233 SBSQ HOSP IP/OBS HIGH 50: CPT | Mod: ,,, | Performed by: INTERNAL MEDICINE

## 2024-02-26 PROCEDURE — 4A023N7 MEASUREMENT OF CARDIAC SAMPLING AND PRESSURE, LEFT HEART, PERCUTANEOUS APPROACH: ICD-10-PCS | Performed by: INTERNAL MEDICINE

## 2024-02-26 PROCEDURE — C1769 GUIDE WIRE: HCPCS | Performed by: INTERNAL MEDICINE

## 2024-02-26 PROCEDURE — 80048 BASIC METABOLIC PNL TOTAL CA: CPT | Performed by: STUDENT IN AN ORGANIZED HEALTH CARE EDUCATION/TRAINING PROGRAM

## 2024-02-26 PROCEDURE — 99152 MOD SED SAME PHYS/QHP 5/>YRS: CPT | Performed by: INTERNAL MEDICINE

## 2024-02-26 RX ORDER — METHYLPREDNISOLONE SODIUM SUCCINATE 125 MG/2ML
INJECTION INTRAMUSCULAR; INTRAVENOUS
Status: DISCONTINUED | OUTPATIENT
Start: 2024-02-26 | End: 2024-02-29

## 2024-02-26 RX ORDER — HEPARIN SOD,PORCINE/0.9 % NACL 1000/500ML
INTRAVENOUS SOLUTION INTRAVENOUS
Status: DISCONTINUED | OUTPATIENT
Start: 2024-02-26 | End: 2024-02-29

## 2024-02-26 RX ORDER — LIDOCAINE HYDROCHLORIDE 20 MG/ML
INJECTION, SOLUTION INFILTRATION; PERINEURAL
Status: DISCONTINUED | OUTPATIENT
Start: 2024-02-26 | End: 2024-02-29

## 2024-02-26 RX ORDER — SODIUM CHLORIDE 9 MG/ML
INJECTION, SOLUTION INTRAVENOUS CONTINUOUS
Status: ACTIVE | OUTPATIENT
Start: 2024-02-26 | End: 2024-02-26

## 2024-02-26 RX ORDER — TALC
6 POWDER (GRAM) TOPICAL NIGHTLY PRN
Status: DISCONTINUED | OUTPATIENT
Start: 2024-02-26 | End: 2024-03-02 | Stop reason: HOSPADM

## 2024-02-26 RX ORDER — MIDAZOLAM HYDROCHLORIDE 1 MG/ML
INJECTION INTRAMUSCULAR; INTRAVENOUS
Status: DISCONTINUED | OUTPATIENT
Start: 2024-02-26 | End: 2024-02-29

## 2024-02-26 RX ORDER — FENTANYL CITRATE 50 UG/ML
INJECTION, SOLUTION INTRAMUSCULAR; INTRAVENOUS
Status: DISCONTINUED | OUTPATIENT
Start: 2024-02-26 | End: 2024-02-29

## 2024-02-26 RX ORDER — DIPHENHYDRAMINE HCL 50 MG
50 CAPSULE ORAL ONCE
Status: COMPLETED | OUTPATIENT
Start: 2024-02-26 | End: 2024-02-26

## 2024-02-26 RX ORDER — HYDROCODONE BITARTRATE AND ACETAMINOPHEN 500; 5 MG/1; MG/1
TABLET ORAL
Status: DISCONTINUED | OUTPATIENT
Start: 2024-02-26 | End: 2024-02-29

## 2024-02-26 RX ORDER — HEPARIN SODIUM 1000 [USP'U]/ML
INJECTION, SOLUTION INTRAVENOUS; SUBCUTANEOUS
Status: DISCONTINUED | OUTPATIENT
Start: 2024-02-26 | End: 2024-02-29

## 2024-02-26 RX ADMIN — HYDRALAZINE HYDROCHLORIDE 50 MG: 50 TABLET ORAL at 06:02

## 2024-02-26 RX ADMIN — ERYTHROPOIETIN 10000 UNITS: 10000 INJECTION, SOLUTION INTRAVENOUS; SUBCUTANEOUS at 12:02

## 2024-02-26 RX ADMIN — DIPHENHYDRAMINE HYDROCHLORIDE 50 MG: 50 CAPSULE ORAL at 03:02

## 2024-02-26 RX ADMIN — SEVELAMER CARBONATE 800 MG: 800 TABLET, FILM COATED ORAL at 10:02

## 2024-02-26 RX ADMIN — PREDNISONE 5 MG: 2.5 TABLET ORAL at 10:02

## 2024-02-26 RX ADMIN — METOPROLOL SUCCINATE 12.5 MG: 25 TABLET, EXTENDED RELEASE ORAL at 10:02

## 2024-02-26 RX ADMIN — TACROLIMUS 3 MG: 1 CAPSULE ORAL at 06:02

## 2024-02-26 RX ADMIN — HEPARIN SODIUM AND DEXTROSE 12 UNITS/KG/HR: 10000; 5 INJECTION INTRAVENOUS at 06:02

## 2024-02-26 RX ADMIN — SEVELAMER CARBONATE 800 MG: 800 TABLET, FILM COATED ORAL at 06:02

## 2024-02-26 RX ADMIN — AMIODARONE HYDROCHLORIDE 200 MG: 200 TABLET ORAL at 10:02

## 2024-02-26 RX ADMIN — FERUMOXYTOL 510 MG: 510 INJECTION INTRAVENOUS at 02:02

## 2024-02-26 RX ADMIN — HYDRALAZINE HYDROCHLORIDE 50 MG: 50 TABLET ORAL at 02:02

## 2024-02-26 RX ADMIN — TIOTROPIUM BROMIDE INHALATION SPRAY 2 PUFF: 1.56 SPRAY, METERED RESPIRATORY (INHALATION) at 09:02

## 2024-02-26 RX ADMIN — SEVELAMER CARBONATE 800 MG: 800 TABLET, FILM COATED ORAL at 12:02

## 2024-02-26 RX ADMIN — ASPIRIN 81 MG: 81 TABLET, COATED ORAL at 10:02

## 2024-02-26 RX ADMIN — SODIUM CHLORIDE: 9 INJECTION, SOLUTION INTRAVENOUS at 03:02

## 2024-02-26 RX ADMIN — DOXAZOSIN 4 MG: 2 TABLET ORAL at 10:02

## 2024-02-26 RX ADMIN — FAMOTIDINE 20 MG: 20 TABLET ORAL at 10:02

## 2024-02-26 RX ADMIN — GUAIFENESIN 600 MG: 600 TABLET, EXTENDED RELEASE ORAL at 10:02

## 2024-02-26 RX ADMIN — TACROLIMUS 3 MG: 1 CAPSULE ORAL at 10:02

## 2024-02-26 RX ADMIN — MUPIROCIN: 20 OINTMENT TOPICAL at 10:02

## 2024-02-26 RX ADMIN — ATORVASTATIN CALCIUM 80 MG: 20 TABLET, FILM COATED ORAL at 10:02

## 2024-02-26 RX ADMIN — THERA TABS 1 TABLET: TAB at 10:02

## 2024-02-26 NOTE — ASSESSMENT & PLAN NOTE
Creatine stable for now. BMP reviewed- noted Estimated Creatinine Clearance: 11.3 mL/min (A) (based on SCr of 7 mg/dL (H)). according to latest data. Based on current GFR, CKD stage is stage 5 - GFR < 15.  Monitor UOP and serial BMP and adjust therapy as needed. Renally dose meds. Avoid nephrotoxic medications and procedures.  - Fistula recently created 12/18/23 (unresponsive during recent outpatient fistulogram leading to previous hospitalization)  - Volume overloaded on admission- renal failure worsening. Previously ESRD s/p transplant x2, now progressed back to ESRD  - 1st session of HD 02/24  - Planned T/Th/Sat schedule- chair at [facility]- not available until Tuesday, 03/05/24  - HD today- next session this Saturday- then can plan to dc after

## 2024-02-26 NOTE — ASSESSMENT & PLAN NOTE
- LHC done 2/26:  MV disease involving LAD, Mrg, notably 95% pRCA occlusion  - CTS consulted- not a surgical candidate  - underwent PCI w/2x DIEGO to RCA 02/28- no chest pain, doing well  - continue asa, plavix, eliquis x1mo- then plavix and eliquis for life

## 2024-02-26 NOTE — PROGRESS NOTES
[facility] - [facility]iology Step[facility]  Kidney Transplant  Progress Note      Reason for Follow-up: Reassessment of Kidney Transplant Referral - 10/24/2023 - Declined recipient and management of immunosuppression.       Subjective:   History of Present Illness:    68 year old male with ESRD s/p kidney transplant x2 (1992, 2005) on chronic immunosuppressive medications Prograf 3mg BID and Prednisone 5mg QD, CAD s/p PCI (2006), HTN, HLD, Chronic diastolic CHF, tachycardia-bradycardia syndrome with periods of pAF admitted to vascular surgery.     C/C; Pt presented with crushing chest pain. Trop elevated to .08. ECG with ST depressions in inferior leads and dynamic changes. Bedside ECHO without WMA. Discussed case with IC staff. Initial plan for LHC although chest pain improved after 3 nitro and morphine.     Last admission; Presented for outpatient fistulogram (recently had the fistula created on 12/18/23) with plans to start HD however had a period of unresponsiveness after the procedure. Pt was drowsy but arousable and able to answer questions after the episode. Wife reports he's had episodes similar to this for about 1.5 years however he has never been evaluated for them. Wife denies associated urinary or fecal incontinence, tongue biting, or whole body shaking associated with these episodes. Pt has an essential tremor and always has BL arm/hand shaking which is at it's baseline per Pt and wife. No personal or FHx seizure. Does not measure his BP or HR at home or during these episodes of unresponsiveness.      KTM consulted for immunosuppressive medication management and need for dialysis.    Mr. Phelps is a 68 y.o. year old male who is status post Kidney Transplant Referral - 10/24/2023 - Declined.    His maintenance immunosuppression consists of:   Immunosuppressants (From admission, onward)      Start     Stop Route Frequency Ordered    02/24/24 0800  tacrolimus capsule 3 mg         -- Oral 2 times daily 02/23/24 4877             Hospital Course:  No notes on file    Interval History:  Had a raised trop I on repeat blood checking, cardiology is advising an angiography, received his first dialysis session on 02/24, HgB 6.4, Scr 7.0, electrolytes non emergent.  Will undergoes another session of dialysis today, Iron supplements(Ferumoxytol), Procrit [id number] after iron stores repletion.  Immunosuppressions dosage will need tapering as immediate stoppage will put him at high risk if acute rejection (3mg BID for first 2 months, then 2mg BID for next two months, then 1mg BID for additional 2 months and at last 1mg QD for 1 month).    Past Medical, Surgical, Family, and Social History:   Unchanged from H&P.    Scheduled Meds:   sodium chloride 0.9%   Intravenous Once    amiodarone  200 mg Oral Daily    aspirin  81 mg Oral Daily    atorvastatin  80 mg Oral Daily    doxazosin  4 mg Oral Q12H    epoetin samara (PROCRIT) injection  10,000 Units Subcutaneous Once    famotidine  20 mg Oral Daily    ferumoxytoL (FERAHEME) 510 mg in dextrose 5 % (D5W) 117 mL IVPB  510 mg Intravenous Once    guaiFENesin  600 mg Oral BID    hydrALAZINE  50 mg Oral Q8H    metoprolol succinate  12.5 mg Oral Daily    multivitamin  1 tablet Oral Daily    mupirocin   Nasal BID    NIFEdipine  60 mg Oral BID    predniSONE  5 mg Oral Daily    sevelamer carbonate  800 mg Oral TID WM    tacrolimus  3 mg Oral BID    tiotropium bromide  2 puff Inhalation Daily     Continuous Infusions:   heparin (porcine) in D5W 12 Units/kg/hr (02/26/24 0624)     PRN Meds:0.9%  NaCl infusion (for blood administration), sodium chloride 0.9%, acetaminophen, albuterol-ipratropium, gabapentin, heparin (PORCINE), heparin (PORCINE), hydrALAZINE, HYDROcodone-acetaminophen, melatonin, nitroGLYCERIN, sodium chloride 0.9%, sodium chloride 0.9%    Intake/Output - Last 3 Shifts         02/24 0700 02/25 0659 02/25 0700 02/26 0659 02/26 0700 02/27 0659    P.O. 1177 455     Total Intake(mL/kg) 1177  "(13.3) 455 (5.2)     Urine (mL/kg/hr) 870 (0.4) 250 (0.1)     Emesis/NG output 0      Other 2003      Stool 0 0     Total Output 2873 250     Net -1696 +205            Urine Occurrence 0 x 0 x     Stool Occurrence 0 x 0 x              Review of Systems   Constitutional:  Positive for fatigue.   HENT: Negative.     Eyes: Negative.    Respiratory:  Positive for chest tightness.    Cardiovascular:  Positive for chest pain.   Endocrine: Negative.    Genitourinary: Negative.    Musculoskeletal: Negative.    Allergic/Immunologic: Negative.    Hematological: Negative.    Psychiatric/Behavioral: Negative.        Objective:     Vital Signs (Most Recent):  Temp: 97.9 °F (36.6 °C) (02/26/24 1119)  Pulse: 89 (02/26/24 1119)  Resp: 20 (02/26/24 1119)  BP: 120/62 (02/26/24 1119)  SpO2: (!) 91 % (02/26/24 1119) Vital Signs (24h Range):  Temp:  [97.6 °F (36.4 °C)-98 °F (36.7 °C)] 97.9 °F (36.6 °C)  Pulse:  [58-89] 89  Resp:  [18-20] 20  SpO2:  [91 %-98 %] 91 %  BP: (120-139)/(57-63) 120/62     Weight: 88 kg (194 lb)  Height: 6' 1" (185.4 cm)  Body mass index is 25.6 kg/m².     Physical Exam  Pulmonary:      Effort: Pulmonary effort is normal.   Abdominal:      Palpations: Abdomen is soft.   Musculoskeletal:         General: Swelling present.      Right lower leg: Edema present.      Left lower leg: Edema present.   Skin:     General: Skin is warm.   Neurological:      General: No focal deficit present.      Mental Status: He is alert and oriented to person, place, and time.   Psychiatric:         Mood and Affect: Mood normal.         Behavior: Behavior normal.          Laboratory:  CBC:   Recent Labs   Lab 02/25/24  0300 02/25/24  1204 02/26/24  0339   WBC 8.43 8.92 6.85   RBC 2.84* 2.78* 2.50*   HGB 7.5* 7.2* 6.4*   HCT 23.4* 22.6* 20.0*   * 114* 114*   MCV 82 81* 80*   MCH 26.4* 25.9* 25.6*   MCHC 32.1 31.9* 32.0     BMP:   Recent Labs   Lab 02/24/24  0533 02/25/24  0300 02/26/24  0339   * 79 75    141 141   K " 4.5 3.8 3.6    106 107   CO2 20* 22* 21*   BUN 68* 56* 64*   CREATININE 8.1* 6.6* 7.0*   CALCIUM 7.8* 8.0* 7.6*       Diagnostic Results:  Chest X-Ray: No results found. However, due to the size of the patient record, not all encounters were searched. Please check Results Review for a complete set of results.  Echo: No results found. However, due to the size of the patient record, not all encounters were searched. Please check Results Review for a complete set of results.  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     Plan;  -HD session today  -I/Os  -Daily CBC, BMP, 12h FK levels    Anemia;  HgB 6.4 today, will repelish iron before Procrit administration with Ferumoxytol          H/O kidney transplant  S/P 2 KT 1992 and 2005  Home immunosuppresants; Tacro 3mg BID and Pred 5mg QD  Baseline Scr 2.8-3.2, CKD V  Would need tapering of Prograf (3mg BID --> 2 months, 2mg BID --> 2 months, 1mg BID 2 months and then at last 1mg QD for 1 month)  Will keep on 3mg BID prograf for now for 2 months            CAD (coronary artery disease)  CAD s/p CABG 2006  NSTEMI 02/23/23   Elevated Trops 13 on 02/25, 10 on 02/26, cardiology consulted, planing for anglography for today  Per cardiology     Electrolytes;  Non emergent          Acidosis;       HCO3 21       NaHCO3 1300mg BI        Hypertension;      On Hydralazine 50mg TID, Nifedipine 60 QD      Cece James MD  Kidney Transplant  [facility]

## 2024-02-26 NOTE — PLAN OF CARE
Pt resting in bed. Eyes open spontaneously. Symmetrical rise and fall of chest noted. Respirations even and unlabored. No distress noted on exam. No further concerns as of present. Pt expresses no other needs at this time. Call light within reach. Plan of care ongoing.

## 2024-02-26 NOTE — PROCEDURES
Brief Operative Note:    : Tylor Lincoln MD     Referring Physician: Self,Aaareferral     All Operators: Surgeon(s):  Julian Pruett MD Davis, Arthur P, MD Jenkins, James S., MD Maitas, Oscar, MD Ahmed Hallak, MD Ankit Vyas, MD    Preoperative Diagnosis: Shortness of breath [R06.02]  CAD (coronary artery disease) [I25.10]  ESRD (end stage renal disease) [N18.6]     Postop Diagnosis: Shortness of breath [R06.02]  CAD (coronary artery disease) [I25.10]  ESRD (end stage renal disease) [N18.6]    Treatments/Procedures: Procedure(s) (LRB):  Left heart cath (N/A)    Access: Right Radial Artery    Findings: Multiple Vessel disease is present.     See catheterization report for full details.    Intervention: None.     See catheterization report for full details.    Closure device: Vasc Band      Plan:  - Post cath protocol   - Bed rest x 2 hours   - Continue aspirin 81 mg daily indefinitely  - Continue high intensity statin therapy (LDL goal < 70)  - Risk factor reduction (BP <130/80 mmHg, glycemic control, etc)  - Continue Heparin IV drip  - Continue Maximum Medical Management  - Recommend CT Surgery Consult    Estimated Blood loss: 20 cc    Specimens removed: No    Gregory Eisenberg MD  Vascular Medicine Fellow PGY-4  02/26/2024

## 2024-02-26 NOTE — PROGRESS NOTES
[facility] Medicine  Progress Note    Patient Name: Ibrahima Phelps  MRN: [id number]  Patient Class: IP- Inpatient   Admission Date: 2/23/2024  Length of Stay: 3 days  Attending Physician: Yamil Silva DO  Primary Care Provider: Anatoliy Colindres MD        Subjective:     Principal Problem:NSTEMI (non-ST elevated myocardial infarction)        HPI:  Mr. Phelps is a a 69 yo male with a PMHx of paroxysmal atrial fibrillation, tachy-loan syndrome, HFpEF with most recent LVEF 65%, CAD s/p PCI 2006, HTN, HLD, aortic atherosclerosis, secondary hyperparathyroidism, diverticulosis, COPD, GERD, hypothyroidism, gout, a history of tobacco use, obesity, ESRD s/p kidney transplant x2 (1992, 2005) on chronic immunosuppressive medication who presents with chest pain.      Recent admission and discharge 2/21/24. Syncope after vascular procedure. No syncopal episodes or events on telemetry were noted after admission. IV Lasix was given noting BNP > 2000 and overload on exam.     In the ED his HR was in 60-70s, -180s. Plts 121. K 2.9, Cr 7.8. Trop .08. BNP 1929. LA 1.71. CXr with pulmonary edema and pleural effusion on the right. ECF with AF with RVR with ST depressions in inferior leads and non-specific changes. Pt complaining of crushing chest pain which started this AM around 3 and continued until he got here. PT given Nitro in ED x3 and morphine twice. Pt noted chest pain is improved on my evaluation and he is chest pain free.     Overview/Hospital Course:  History of ESRD status post renal transplant x2 initially presented for chest pain, NSTEMI and on ACS protocol.  Admitted to CCU, LHC was initially planned however pt was noted to not be fully aware of procedural risks and in addition due to renal dysfunction therefore LHC was aborted. Now planned for 2/26. Had been hypervolemic on exam, likely requiring dialysis. CXR with pulmonary edema and increased size of small right pleural effusion.  Fistula had previously created on 12/2023 with plan for HD in the future.  Was recently discharged on 02/21, HD recommended by KTM team however pt had declined. KTM currently on board, pt now agreeable for dialysis    Interval History:  Seen this a.m. at bedside.  Denies any chest pain, shortness of breath.  No acute events overnight    Review of Systems  Objective:     Vital Signs (Most Recent):  Temp: 98 °F (36.7 °C) (02/26/24 1240)  Pulse: 99 (02/26/24 1240)  Resp: 18 (02/26/24 1240)  BP: 131/64 (02/26/24 1240)  SpO2: 98 % (02/26/24 1240) Vital Signs (24h Range):  Temp:  [97.6 °F (36.4 °C)-98.2 °F (36.8 °C)] 98 °F (36.7 °C)  Pulse:  [] 99  Resp:  [18-20] 18  SpO2:  [91 %-98 %] 98 %  BP: (120-139)/(57-75) 131/64     Weight: 88 kg (194 lb)  Body mass index is 25.6 kg/m².    Intake/Output Summary (Last 24 hours) at 2/26/2024 1248  Last data filed at 2/25/2024 1752  Gross per 24 hour   Intake 100 ml   Output --   Net 100 ml           Physical Exam  Constitutional:       General: He is not in acute distress.     Appearance: He is ill-appearing. He is not toxic-appearing.   HENT:      Head: Normocephalic and atraumatic.   Eyes:      Pupils: Pupils are equal, round, and reactive to light.   Cardiovascular:      Rate and Rhythm: Normal rate and regular rhythm.      Pulses: Normal pulses.   Pulmonary:      Effort: Pulmonary effort is normal. No respiratory distress.      Breath sounds: Rales present. No wheezing or rhonchi.   Abdominal:      General: Abdomen is flat. Bowel sounds are normal. There is no distension.      Palpations: Abdomen is soft.      Tenderness: There is no abdominal tenderness.   Musculoskeletal:         General: Normal range of motion.      Cervical back: Normal range of motion.      Right lower leg: Edema present.      Left lower leg: Edema present.   Skin:     General: Skin is warm and dry.   Neurological:      General: No focal deficit present.      Mental Status: He is alert. Mental status  is at baseline.   Psychiatric:         Mood and Affect: Mood normal.         Behavior: Behavior normal.         Thought Content: Thought content normal.             Significant Labs: All pertinent labs within the past 24 hours have been reviewed.    Significant Imaging: I have reviewed all pertinent imaging results/findings within the past 24 hours.    Assessment/Plan:      * NSTEMI (non-ST elevated myocardial infarction)  -initially presenting with chest pain, although attributed to increased LVEDP/volume overload  -No active chest pain, hemodynamically stable.  EKG on admission without ischemic changes  -completed 48hr heparin ACS protocol, heparin not previously trended, repeat obtained this a.m. markedly elevated at 13  -s/p LHC apparently on 2/23 however findings unknown as final report not in EMR  -TTE 2/26: preserved EF, no noted RWA  Plan:  -Continue heparin ggt  -contacted Cardiology, planning for [facility] today  -continue to monitor on telemetry      ESRD (end stage renal disease)  Creatine stable for now. BMP reviewed- noted Estimated Creatinine Clearance: 11.3 mL/min (A) (based on SCr of 7 mg/dL (H)). according to latest data. Based on current GFR, CKD stage is stage 5 - GFR < 15.  Monitor UOP and serial BMP and adjust therapy as needed. Renally dose meds. Avoid nephrotoxic medications and procedures.  -Fistula recently created 12/18/23 (unresponsive during recent outpatient fistulogram leading to previous hospitalization)  -Volume overloaded on exam, renal failure worsening. Previously ESRD s/p transplant x2, now progressed back to ESRD  -KTM discussing HD with patient, pt now agreeable. S/p first session on 2/24  -SW for outpt HD chair    Anemia of chronic kidney failure, stage 5  Patient's anemia is currently controlled. Has received 1 units of PRBCs on 2/26 . Etiology likely d/t chronic disease due to Chronic Kidney Disease/ESRD  Current CBC reviewed-   Lab Results   Component Value Date    HGB 6.4 (L)  02/26/2024    HCT 20.0 (L) 02/26/2024     Monitor serial CBC and transfuse if patient becomes hemodynamically unstable, symptomatic or H/H drops below 7/21.    Phosphorus metabolism disorder  Sevelamer      Failed kidney transplant        Acute on chronic diastolic heart failure  -Status post IV diuresis, now euvolemic  -TTE 2/26 with preserved EF      Chronic heart failure with preserved ejection fraction  -Not in exacerbation, volume overload likely due to ESRD  -TTE ordered  -HD as per schedule      Paroxysmal atrial fibrillation  Patient with Paroxysmal (<7 days) atrial fibrillation which is controlled currently with Beta Blocker and Amiodarone. Patient is currently in sinus rhythm.[id number] Score: 2. HASBLED Score: . Anticoagulation indicated. Anticoagulation done with heparin .    H/O kidney transplant  Failed renal transplant, now ESRD  KTM following      Mixed hyperlipidemia  ASA, statin      Primary hypertension  Chronic, controlled. Latest blood pressure and vitals reviewed-     Temp:  [97 °F (36.1 °C)-98.4 °F (36.9 °C)]   Pulse:  [53-73]   Resp:  [16-18]   BP: (134-156)/(60-76)   SpO2:  [88 %-97 %] .   Home meds for hypertension were reviewed and noted below.   Hypertension Medications               doxazosin (CARDURA) 4 MG tablet Take 1 tablet (4 mg total) by mouth every 12 (twelve) hours.    furosemide (LASIX) 80 MG tablet Take 1 tablet (80 mg total) by mouth 2 (two) times a day.    hydrALAZINE (APRESOLINE) 50 MG tablet TAKE 1 TABLET (50 MG TOTAL) BY MOUTH 3 (THREE) TIMES DAILY.    isosorbide mononitrate (IMDUR) 30 MG 24 hr tablet Take 4 tablets (120 mg total) by mouth once daily.    metoprolol succinate (TOPROL-XL) 25 MG 24 hr tablet Take 0.5 tablets (12.5 mg total) by mouth once daily.    NIFEdipine (PROCARDIA-XL) 60 MG (OSM) 24 hr tablet Take 1 tablet (60 mg total) by mouth 2 (two) times a day.    nitroGLYCERIN (NITROSTAT) 0.4 MG SL tablet Place 1 tablet (0.4 mg total) under the tongue every 5  (five) minutes as needed for Chest pain.            While in the hospital, will manage blood pressure as follows; Continue home antihypertensive regimen    Will utilize p.r.n. blood pressure medication only if patient's blood pressure greater than 180/110 and he develops symptoms such as worsening chest pain or shortness of breath.      VTE Risk Mitigation (From admission, onward)           Ordered     heparin 25,000 units in dextrose 5% (100 units/ml) IV bolus from bag LOW INTENSITY nomogram - [facility]  As needed (PRN)        Question:  Heparin Infusion Adjustment (DO NOT MODIFY ANSWER)  Answer:  [email or web address]\Images\Pharmacy\HeparinInfusions\heparin LOW INTENSITY nomogram for [facility] [id number]    02/25/24 1129     heparin 25,000 units in dextrose 5% (100 units/ml) IV bolus from bag LOW INTENSITY nomogram - [facility]  As needed (PRN)        Question:  Heparin Infusion Adjustment (DO NOT MODIFY ANSWER)  Answer:  [email or web address]\Images\Pharmacy\HeparinInfusions\heparin LOW INTENSITY nomogram for [facility] [id number]    02/25/24 1129     heparin 25,000 units in dextrose 5% 250 mL (100 units/mL) infusion LOW INTENSITY nomogram - [facility]  Continuous        Question:  Begin at (units/kg/hr)  Answer:  12    02/25/24 1129     IP VTE HIGH RISK PATIENT  Once         02/23/24 1122     Place sequential compression device  Until discontinued         02/23/24 1122                    Discharge Planning   RAMU: 2/27/2024     Code Status: Full Code   Is the patient medically ready for discharge?: No    Reason for patient still in hospital (select all that apply): Patient trending condition  Discharge Plan A: Home with family                  Yamil Silva DO  Department of Hospital Medicine   [facility] - [facility] Step[facility]

## 2024-02-26 NOTE — CONSULTS
[facility] - Cardiology Stepdown  Cardiology  Consult Note    Patient Name: Ibrahima Phelps  MRN: [id number]  Admission Date: 2/23/2024  Hospital Length of Stay: 3 days  Code Status: Full Code   Attending Provider: Yamil Silva DO   Consulting Provider: Gregory Eisenbegr MD  Primary Care Physician: Anatoliy Colindres MD  Principal Problem:NSTEMI (non-ST elevated myocardial infarction)    Patient information was obtained from patient, spouse/SO, past medical records, ER records, and primary team.     Inpatient consult to Interventional Cardiology  Consult performed by: Gregory Eisenberg MD  Consult ordered by: Yamil Silva DO  Reason for consult: NSTEMI        Subjective:     Chief Complaint:  Chest Pain, SOB    HPI:   Mr. Phelps is a a 67 yo male with a PMHx of paroxysmal atrial fibrillation on amiodarone 200mg qd and apixaban 5mg bid, tachy-loan syndrome, HFpEF with most recent LVEF 65%, CAD s/p PCI 2006 on plavix, HTN, HLD, aortic atherosclerosis, secondary hyperparathyroidism, diverticulosis, COPD, GERD, hypothyroidism, gout, a history of tobacco use, obesity, ESRD s/p kidney transplant x2 (1992, 2005) on chronic immunosuppressive medication who was admitted to [facility] on 02/23/2024 with crushing chest pain, that improved on admission with nitro x3 and morphine x2. He currently experiences SOB with minimal exertion. In the ED HR was in 60-70s, -180s, Plts 121. K 2.9, Cr 7.8. Trop 0.08. BNP 1929. LA 1.71. CXR with pulmonary edema and pleural effusion on the right. ECG with NSR with PACs, and ST depressions in inferior leads and non-specific changes. Admitted to CCU, and taken to cath lab for procedure. However, patient not fully aware of risks of procedure and kidney dysfunction, so C was aborted on 2/23/2024. Patient changed from Apixaban to heparin. Patient was initially refusing hemodialysis, but finally agreed with it on 02/24/2024, where 2L were removed. Patient was stepped down from CCU, and repeat Troponin  "10.66 (2/26) vs 13.2 (2/25) vs 4.2 (2/23) . Interventional Cardiology was consulted for ischemic evaluation.     Hb today (2/26/24) 6.4, Creat 7.0    Past Medical History:   Diagnosis Date    Atrial fibrillation     CAD (coronary artery disease) 2006    MI in 2006    CHF (congestive heart failure) 2017    CKD (chronic kidney disease) stage 3, GFR 30-59 ml/min     Dr. Latif.  in transplanted kidney    CKD (chronic kidney disease) stage 5, GFR less than 15 ml/min 02/02/2024    COVID-19 02/07/2023    Diverticulosis     Hyperlipidemia     Hypertension     Keloid cicatrix     Metabolic bone disease     Other emphysema 10/01/2019    Pericarditis     S/P kidney transplant 1992    x2 (1992 & 2005),    Thrombocytopenia     Thyroid disease     Tobacco use 09/05/2014       Past Surgical History:   Procedure Laterality Date    AV FISTULA PLACEMENT Left 12/18/2023    Procedure: CREATION, AV FISTULA;  Surgeon: JUANI Melendez III, MD;  [facility];  Service: Vascular;  Laterality: Left;  LUE AVF creation vs AVG insertion    CARDIOVERSION  04/30/15    CHOLECYSTECTOMY      COLONOSCOPY  April 20, 2011    Diverticulosis, repeat recommended in 3 yrs., repeat colonoscopy 2014 revealed 2 polyps.  He should return in 5 years.    COLONOSCOPY N/A 3/13/2020    Procedure: COLONOSCOPY;  Surgeon: Chon Casper MD;  Location: [facility] (4TH FLR);  Service: Endoscopy;  Laterality: N/A;  ok to hold coumadin x5days-see telephone encounter 2/4/20-tb    COLONOSCOPY N/A 2/4/2021    Procedure: COLONOSCOPY;  Surgeon: Chon Casper MD;  Location: [facility] (4TH FLR);  Service: Endoscopy;  Laterality: N/A;  Eliquis - per Dr. GAVIN Blunt ok to hold x 2 days and "restarted asap"- ERW  last prep "poor", [id number] ordered/ Pt requests Dr. Casper  prep ins. mailed - COVID screening on 2/1/21 PCW- ERW    COLONOSCOPY N/A 3/3/2021    Procedure: COLONOSCOPY;  Surgeon: Chon Casper MD;  Location: [facility] (4TH FLR);  Service: " Endoscopy;  Laterality: N/A;  Patient with his second poor bowel pre and has poor prep today.  If patient not intersted or can't get colonoscopy tomorrow will need constipation bowel prep and will need to restart his Eliquis today.Thanks,Chon     per Dr Lopez to hold Eliquis 2 days prior      COVID     CORONARY ANGIOPLASTY WITH STENT PLACEMENT  9/13/2006    FISTULOGRAM N/A 2/19/2024    Procedure: Fistulogram;  Surgeon: JUANI Melendez III, MD;  Location: [facility] CATH LAB;  Service: Peripheral Vascular;  Laterality: N/A;    IRRIGATION AND DEBRIDEMENT Right 8/30/2023    Procedure: IRRIGATION AND DEBRIDEMENT, RIGHT MIDDLE FINGER;  Surgeon: Martin Larsen MD;  Location: [facility] OR [facility] FLR;  Service: Orthopedics;  Laterality: Right;    KIDNEY TRANSPLANT  2005    PARATHYROIDECTOMY      PERCUTANEOUS TRANSLUMINAL ANGIOPLASTY OF ARTERIOVENOUS FISTULA Left 2/19/2024    Procedure: PTA, AV FISTULA;  Surgeon: JUANI Melendez III, MD;  Location: [facility] CATH LAB;  Service: Peripheral Vascular;  Laterality: Left;    TREATMENT OF CARDIAC ARRHYTHMIA N/A 3/28/2022    Procedure: CARDIOVERSION;  Surgeon: Migue Blunt MD;  Location: [facility] EP LAB;  Service: Cardiology;  Laterality: N/A;  AF, DCC, MAC, GP, 3 PREP*RODRIGO deferred pt 100% compliant*       Review of patient's allergies indicates:   Allergen Reactions    Ace inhibitors Swelling    Verapamil Other (See Comments)     Other reaction(s): Unknown    Povidone-iodine Itching       No current facility-administered medications on file prior to encounter.     Current Outpatient Medications on File Prior to Encounter   Medication Sig    acetaminophen (TYLENOL) 500 MG tablet Take 1 tablet (500 mg total) by mouth every 6 (six) hours as needed for Pain.    albuterol (VENTOLIN HFA) 90 mcg/actuation inhaler Inhale 2 puffs into the lungs every 6 (six) hours as needed for Wheezing or Shortness of Breath. Rescue    albuterol-ipratropium (DUO-NEB) 2.5 mg-0.5 mg/3 mL nebulizer solution Take  3 mLs by nebulization every 6 (six) hours as needed for Wheezing. Rescue. Can be used in place of albuterol inhaler.    amiodarone (PACERONE) 200 MG Tab Take 1 tablet (200 mg total) by mouth once daily.    apixaban (ELIQUIS) 5 mg Tab Take 1 tablet (5 mg total) by mouth 2 (two) times daily.    atorvastatin (LIPITOR) 40 MG tablet Take 2 tablets (80 mg total) by mouth once daily.    b complex vitamins (B COMPLEX-VITAMIN B12) tablet Take 1 tablet by mouth once daily.    calcium carbonate (CALCIUM 600 ORAL) Take 1 tablet by mouth 2 (two) times a day.    CHOLECALCIFEROL, VITAMIN D3, ORAL Take 2 tablets by mouth 2 (two) times daily.    clopidogreL (PLAVIX) 75 mg tablet Take 1 tablet (75 mg total) by mouth once daily.    doxazosin (CARDURA) 4 MG tablet Take 1 tablet (4 mg total) by mouth every 12 (twelve) hours.    famotidine (PEPCID) 20 MG tablet Take 1 tablet (20 mg total) by mouth 2 (two) times daily.    fexofenadine HCl (ALLEGRA ORAL) Take 1 tablet by mouth daily as needed (allergies).    fluticasone furoate-vilanteroL (BREO) 100-25 mcg/dose diskus inhaler Inhale 1 puff into the lungs once daily. Controller. Use this inhaler every day. (Patient not taking: Reported on 2/6/2024)    fluticasone propionate (FLONASE) 50 mcg/actuation nasal spray 1 spray (50 mcg total) by Each Nostril route daily as needed for Allergies.    FLUZONE HIGHDOSE QUAD 23-24  mcg/0.7 mL Syrg     furosemide (LASIX) 80 MG tablet Take 1 tablet (80 mg total) by mouth 2 (two) times a day.    gabapentin (NEURONTIN) 100 MG capsule Take 1 capsule (100 mg total) by mouth as needed (pain).    hydrALAZINE (APRESOLINE) 50 MG tablet TAKE 1 TABLET (50 MG TOTAL) BY MOUTH 3 (THREE) TIMES DAILY.    HYDROcodone-acetaminophen (NORCO) 5-325 mg per tablet Take 1 tablet by mouth every 6 (six) hours as needed for Pain.    isosorbide mononitrate (IMDUR) 30 MG 24 hr tablet Take 4 tablets (120 mg total) by mouth once daily.    metoprolol succinate (TOPROL-XL) 25 MG 24  hr tablet Take 0.5 tablets (12.5 mg total) by mouth once daily.    MITIGARE 0.6 mg Cap TAKE 1 CAPSULE BY MOUTH TWICE A DAY AS NEEDED GOUT FLARE UP TO 3 DAYS    multivitamin (THERAGRAN) per tablet Take 1 tablet by mouth Daily.    NIFEdipine (PROCARDIA-XL) 60 MG (OSM) 24 hr tablet Take 1 tablet (60 mg total) by mouth 2 (two) times a day.    nitroGLYCERIN (NITROSTAT) 0.4 MG SL tablet Place 1 tablet (0.4 mg total) under the tongue every 5 (five) minutes as needed for Chest pain. (Patient not taking: Reported on 2/6/2024)    omeprazole (PRILOSEC) 20 MG capsule Take 1 capsule (20 mg total) by mouth daily as needed (heartburn).    paricalcitoL (ZEMPLAR) 1 MCG capsule TAKE 1 CAPSULE ON MONDAY, WEDNESDAY AND FRIDAY    [employer] COVID BIVAL,12Y UP,,PF, 30 mcg/0.3 mL injection Inject into the muscle.    potassium chloride (KLOR-CON) 10 MEQ TbSR Take 1 tablet (10 mEq total) by mouth daily as needed (to be taken with lasix).,    predniSONE (DELTASONE) 5 MG tablet Take 1 tablet (5 mg total) by mouth once daily.    pulse oximeter (PULSE OXIMETER) device by Apply Externally route 2 (two) times a day. Use twice daily at 8 AM and 3 PM and record the value in MyChart as directed.    sevelamer carbonate (RENVELA) 800 mg Tab Take 1 tablet (800 mg total) by mouth 3 (three) times daily with meals.    sodium polystyrene (KAYEXALATE) powder Take 60 mLs (15 g total) by mouth once daily.  (Using the teaspoon provided, scoop 4 level teaspoonsfuls, add 60 ml of water to powder.  Shake well and drink immediately.)    SPIKEVAX 1517-8817,12Y UP,,PF, 50 mcg/0.5 mL injection     tacrolimus (PROGRAF) 1 MG Cap TAKE 3 CAPSULES (3 MG TOTAL) BY MOUTH EVERY 12 (TWELVE) HOURS.    tiotropium bromide (SPIRIVA RESPIMAT) 1.25 mcg/actuation inhaler Inhale 2 puffs into the lungs once daily. Controller. Use this inhaler every day.    traZODone (DESYREL) 50 MG tablet Take 1 tablet (50 mg total) by mouth nightly as needed for Insomnia.     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     Patient is currently retired   Substance and Sexual Activity    Alcohol use: No    Drug use: No    Sexual activity: Yes     Partners: Female     Review of Systems   Constitutional: Negative for chills, diaphoresis and night sweats.   Cardiovascular:  Negative for chest pain, dyspnea on exertion, irregular heartbeat, leg swelling, near-syncope and palpitations.   Respiratory:  Positive for shortness of breath. Negative for cough and wheezing.    Skin:  Negative for color change and dry skin.   Musculoskeletal:  Negative for joint pain and joint swelling.   Gastrointestinal:  Negative for abdominal pain, diarrhea, nausea and vomiting.   Genitourinary:  Negative for dysuria.   Neurological:  Positive for weakness. Negative for dizziness, headaches and light-headedness.   All other systems reviewed and are negative.    Objective:     Vital Signs (Most Recent):  Temp: 97.8 °F (36.6 °C) (24 0746)  Pulse: 65 (24 0746)  Resp: 18 (24 0432)  BP: 127/60 (24 0746)  SpO2: 98 % (24 0746) Vital Signs (24h Range):  Temp:  [97.6 °F (36.4 °C)-98.3 °F (36.8 °C)] 97.8 °F (36.6 °C)  Pulse:  [58-70] 65  Resp:  [17-18] 18  SpO2:  [93 %-98 %] 98 %  BP: (122-142)/(58-65) 127/60     Weight: 88.3 kg (194 lb 10.7 oz)  Body mass index is 25.68 kg/m².    SpO2: 98 %         Intake/Output Summary (Last 24 hours) at 2024 0910  Last  data filed at 2/25/2024 1752  Gross per 24 hour   Intake 455 ml   Output 250 ml   Net 205 ml       Lines/Drains/Airways       Peripheral Intravenous Line  Duration                  Hemodialysis AV Fistula 02/19/24 0924 Left upper arm 6 days         Peripheral IV - Single Lumen 02/23/24 0559 18 G Posterior;Right Hand 3 days         Peripheral IV - Single Lumen 02/23/24 0802 20 G Anterior;Right Forearm 3 days                     Physical Exam  Constitutional:       General: He is in acute distress.      Appearance: He is ill-appearing. He is not diaphoretic.   HENT:      Head: Normocephalic and atraumatic.      Nose: No congestion.      Mouth/Throat:      Mouth: Mucous membranes are dry.   Eyes:      General: No scleral icterus.     Extraocular Movements: Extraocular movements intact.      Pupils: Pupils are equal, round, and reactive to light.   Cardiovascular:      Rate and Rhythm: Normal rate and regular rhythm.      Heart sounds: No murmur heard.     No gallop.      Comments: Pulses:  Carotid pulses are 2+ on the right side, and 2+ on the left side.  Radial pulses are 1+ on the right side, and 1+ on the left side.   Femoral pulses are 2+ on the right side, and 2+ on the left side.   Dorsalis pedis pulses are 2+ on the right side, and 2+ on the left side.      Pulmonary:      Effort: No respiratory distress.      Breath sounds: No rhonchi or rales.   Chest:      Chest wall: No tenderness.   Abdominal:      General: Bowel sounds are normal. There is no distension.      Tenderness: There is no abdominal tenderness.   Musculoskeletal:         General: No swelling or tenderness.      Cervical back: Normal range of motion. No rigidity.      Comments: Left UE AC Fistula +   Skin:     Capillary Refill: Capillary refill takes less than 2 seconds.      Coloration: Skin is not jaundiced.      Findings: No rash.   Neurological:      General: No focal deficit present.      Mental Status: He is alert and oriented to person,  place, and time.   Psychiatric:         Mood and Affect: Mood normal.          Significant Labs: All pertinent lab results from the last 24 hours have been reviewed.    Significant Imaging: All pertinent imaging have been reviewed.  Assessment and Plan:     * NSTEMI (non-ST elevated myocardial infarction)  67 yo male with a PMHx of paroxysmal atrial fibrillation on amiodarone 200mg qd and apixaban 5mg bid, tachy-loan syndrome, HFpEF with most recent LVEF 65%, CAD s/p PCI 2006 on plavix, HTN, HLD, aortic atherosclerosis, secondary hyperparathyroidism, diverticulosis, COPD, GERD, hypothyroidism, gout, a history of tobacco use, obesity, ESRD s/p kidney transplant x2 (1992, 2005) on chronic immunosuppressive medication who was admitted to [facility] on 02/23/2024 with crushing chest pain. Interventional Cardiology was consulted for ischemic evaluation.     - Troponin 10.66 (2/26) vs 13.2 (2/25) vs 4.2 (2/23)  - Awaiting ECHO   - Hb 6.4, Creat 7.0  - We will do LHC  after the pRBC transfusion today  - Keep NPO  - Continue treatment with ASA, atorvastatin 80mg qd, metoprolol succinate 12.5mg qd  - Continue heparin gtt  - LHC +/- PCI, patient is a DIEGO candidate  - Anti-platelet Therapy: ASA   - Access: Right radial/CFA  - Allergies: Itching with Povidone Iodine  - Pre-Hydration: NS  - Pre-Op Med: Bendaryl 50mg pO   - All patient's questions were answered.  -The risks, benefits and alternatives of the procedure were explained to the patient.   -The risks of coronary angiography include but are not limited to: bleeding, infection, heart rhythm abnormalities, allergic reactions, kidney injury and potential need for dialysis, stroke and death.   - Should stenting be indicated, the patient has agreed to dual anti-platelet therapy for 1-consecutive year with a drug-eluting stent and a minimum of 1-month with the use of a bare metal stent  - Additionally, pt is aware that non-compliance is likely to result in stent clotting with  heart attack, heart failure, and/or death  -The risks of moderate sedation include hypotension, respiratory depression, arrhythmias, bronchospasm, and death.   - Informed consent was obtained and the  patient is agreeable to proceed with the procedure.       VTE Risk Mitigation (From admission, onward)           Ordered     heparin 25,000 units in dextrose 5% (100 units/ml) IV bolus from bag LOW INTENSITY nomogram - [facility]  As needed (PRN)        Question:  Heparin Infusion Adjustment (DO NOT MODIFY ANSWER)  Answer:  [email or web address]\Images\Pharmacy\HeparinInfusions\heparin LOW INTENSITY nomogram for [facility] [id number]    02/25/24 1129     heparin 25,000 units in dextrose 5% (100 units/ml) IV bolus from bag LOW INTENSITY nomogram - [facility]  As needed (PRN)        Question:  Heparin Infusion Adjustment (DO NOT MODIFY ANSWER)  Answer:  [email or web address]\epic\Images\Pharmacy\HeparinInfusions\heparin LOW INTENSITY nomogram for [facility] [id number].pdf    02/25/24 1129     heparin 25,000 units in dextrose 5% 250 mL (100 units/mL) infusion LOW INTENSITY nomogram - [facility]  Continuous        Question:  Begin at (units/kg/hr)  Answer:  12    02/25/24 1129     IP VTE HIGH RISK PATIENT  Once         02/23/24 1122     Place sequential compression device  Until discontinued         02/23/24 1122                    Thank you for your consult. I will follow-up with patient. Please contact us if you have any additional questions.    Gregory Eisenberg MD  Cardiology   [facility] - Cardiology Stepdown

## 2024-02-26 NOTE — SUBJECTIVE & OBJECTIVE
Interval History:  Seen in HD unit- doing well- no new complaints- discussed how outpatient HD chair not available until this Tuesday, 03/05- will plan for HD this Saturday, then DC afterwards. No nausea, vomiting, fevers, chills, night sweats, abd pain,chest pain, SOB.     Objective:     Vital Signs (Most Recent):  Temp: 98 °F (36.7 °C) (02/26/24 1240)  Pulse: 99 (02/26/24 1240)  Resp: 18 (02/26/24 1240)  BP: 131/64 (02/26/24 1240)  SpO2: 98 % (02/26/24 1240) Vital Signs (24h Range):  Temp:  [97.6 °F (36.4 °C)-98.2 °F (36.8 °C)] 98 °F (36.7 °C)  Pulse:  [] 99  Resp:  [18-20] 18  SpO2:  [91 %-98 %] 98 %  BP: (120-139)/(57-75) 131/64     Weight: 88 kg (194 lb)  Body mass index is 25.6 kg/m².    Intake/Output Summary (Last 24 hours) at 2/26/2024 1248  Last data filed at 2/25/2024 1752  Gross per 24 hour   Intake 100 ml   Output --   Net 100 ml        Physical Exam  Gen: in NAD, appears stated age  Neuro: AAOx3, motor, sensory, and strength grossly intact BL  HEENT: NTNC, EOMI, PERRL, MMM  CVS: RRR, no m/r/g; S1/S2 auscultated with no S3 or S4; capillary refill < 2 sec, AVF of the LUE  Resp: lungs CTAB, no w/r/r; no belabored breathing or accessory muscle use appreciated   Abd: BS+ in all 4 quadrants; NTND, soft to palpation; no organomegaly appreciated   Extrem: pulses full, equal, and regular over all 4 extremities; no UE or LE edema BL- loose skin of BL LE      Significant Labs: All pertinent labs within the past 24 hours have been reviewed.  CBC:   Recent Labs   Lab 02/28/24  0749 02/29/24  0932   WBC 9.77 7.51   HGB 7.8* 8.2*   HCT 24.5* 25.1*   * 145*     CMP:   Recent Labs   Lab 02/28/24  0749 02/29/24  0453    140   K 3.7 4.4    105   CO2 24 23   GLU 97 138*   BUN 54* 61*   CREATININE 6.3* 6.8*   CALCIUM 7.8* 7.5*   ANIONGAP 12 12       Significant Imaging: I have reviewed all pertinent imaging results/findings within the past 24 hours.

## 2024-02-26 NOTE — ASSESSMENT & PLAN NOTE
69 yo male with a PMHx of paroxysmal atrial fibrillation on amiodarone 200mg qd and apixaban 5mg bid, tachy-loan syndrome, HFpEF with most recent LVEF 65%, CAD s/p PCI 2006 on plavix, HTN, HLD, aortic atherosclerosis, secondary hyperparathyroidism, diverticulosis, COPD, GERD, hypothyroidism, gout, a history of tobacco use, obesity, ESRD s/p kidney transplant x2 (1992, 2005) on chronic immunosuppressive medication who was admitted to [facility] on 02/23/2024 with crushing chest pain. Interventional Cardiology was consulted for ischemic evaluation.     - Troponin 10.66 (2/26) vs 13.2 (2/25) vs 4.2 (2/23)  - Awaiting ECHO   - Hb 6.4, Creat 7.0  - We will do LHC  after the pRBC transfusion today  - Keep NPO  - Continue treatment with ASA, atorvastatin 80mg qd, metoprolol succinate 12.5mg qd  - Continue heparin gtt

## 2024-02-26 NOTE — SUBJECTIVE & OBJECTIVE
"Past Medical History:   Diagnosis Date    Atrial fibrillation     CAD (coronary artery disease) 2006    MI in 2006    CHF (congestive heart failure) 2017    CKD (chronic kidney disease) stage 3, GFR 30-59 ml/min     Dr. Latif.  in transplanted kidney    CKD (chronic kidney disease) stage 5, GFR less than 15 ml/min 02/02/2024    COVID-19 02/07/2023    Diverticulosis     Hyperlipidemia     Hypertension     Keloid cicatrix     Metabolic bone disease     Other emphysema 10/01/2019    Pericarditis     S/P kidney transplant 1992    x2 (1992 & 2005),    Thrombocytopenia     Thyroid disease     Tobacco use 09/05/2014       Past Surgical History:   Procedure Laterality Date    AV FISTULA PLACEMENT Left 12/18/2023    Procedure: CREATION, AV FISTULA;  Surgeon: JUANI Melendez III, MD;  [facility];  Service: Vascular;  Laterality: Left;  LUE AVF creation vs AVG insertion    CARDIOVERSION  04/30/15    CHOLECYSTECTOMY      COLONOSCOPY  April 20, 2011    Diverticulosis, repeat recommended in 3 yrs., repeat colonoscopy 2014 revealed 2 polyps.  He should return in 5 years.    COLONOSCOPY N/A 3/13/2020    Procedure: COLONOSCOPY;  Surgeon: Chon Casper MD;  Location: [facility] (4TH FLR);  Service: Endoscopy;  Laterality: N/A;  ok to hold coumadin x5days-see telephone encounter 2/4/20-tb    COLONOSCOPY N/A 2/4/2021    Procedure: COLONOSCOPY;  Surgeon: Chon Casper MD;  Location: [facility] (4TH FLR);  Service: Endoscopy;  Laterality: N/A;  Eliquis - per Dr. GAVIN Blunt ok to hold x 2 days and "restarted asap"- ERW  last prep "poor", [id number] ordered/ Pt requests Dr. Casper  prep ins. mailed - COVID screening on 2/1/21 PCW- ERW    COLONOSCOPY N/A 3/3/2021    Procedure: COLONOSCOPY;  Surgeon: Chon Casper MD;  Location: [facility] (4TH FLR);  Service: Endoscopy;  Laterality: N/A;  Patient with his second poor bowel pre and has poor prep today.  If patient not intersted or can't get colonoscopy tomorrow will " need constipation bowel prep and will need to restart his Eliquis today.Thanks,Chon     per Dr Lopez to hold Eliquis 2 days prior      COVID     CORONARY ANGIOPLASTY WITH STENT PLACEMENT  9/13/2006    FISTULOGRAM N/A 2/19/2024    Procedure: Fistulogram;  Surgeon: JUANI Melendez III, MD;  Location: [facility] CATH LAB;  Service: Peripheral Vascular;  Laterality: N/A;    IRRIGATION AND DEBRIDEMENT Right 8/30/2023    Procedure: IRRIGATION AND DEBRIDEMENT, RIGHT MIDDLE FINGER;  Surgeon: Martin Larsen MD;  Location: [facility] OR [facility] FLR;  Service: Orthopedics;  Laterality: Right;    KIDNEY TRANSPLANT  2005    PARATHYROIDECTOMY      PERCUTANEOUS TRANSLUMINAL ANGIOPLASTY OF ARTERIOVENOUS FISTULA Left 2/19/2024    Procedure: PTA, AV FISTULA;  Surgeon: JUANI Melendez III, MD;  Location: [facility] CATH LAB;  Service: Peripheral Vascular;  Laterality: Left;    TREATMENT OF CARDIAC ARRHYTHMIA N/A 3/28/2022    Procedure: CARDIOVERSION;  Surgeon: Migue Blunt MD;  Location: [facility] EP LAB;  Service: Cardiology;  Laterality: N/A;  AF, DCC, MAC, GP, 3 PREP*RODRIGO deferred pt 100% compliant*       Review of patient's allergies indicates:   Allergen Reactions    Ace inhibitors Swelling    Verapamil Other (See Comments)     Other reaction(s): Unknown    Povidone-iodine Itching       No current facility-administered medications on file prior to encounter.     Current Outpatient Medications on File Prior to Encounter   Medication Sig    acetaminophen (TYLENOL) 500 MG tablet Take 1 tablet (500 mg total) by mouth every 6 (six) hours as needed for Pain.    albuterol (VENTOLIN HFA) 90 mcg/actuation inhaler Inhale 2 puffs into the lungs every 6 (six) hours as needed for Wheezing or Shortness of Breath. Rescue    albuterol-ipratropium (DUO-NEB) 2.5 mg-0.5 mg/3 mL nebulizer solution Take 3 mLs by nebulization every 6 (six) hours as needed for Wheezing. Rescue. Can be used in place of albuterol inhaler.    amiodarone (PACERONE) 200 MG Tab Take  1 tablet (200 mg total) by mouth once daily.    apixaban (ELIQUIS) 5 mg Tab Take 1 tablet (5 mg total) by mouth 2 (two) times daily.    atorvastatin (LIPITOR) 40 MG tablet Take 2 tablets (80 mg total) by mouth once daily.    b complex vitamins (B COMPLEX-VITAMIN B12) tablet Take 1 tablet by mouth once daily.    calcium carbonate (CALCIUM 600 ORAL) Take 1 tablet by mouth 2 (two) times a day.    CHOLECALCIFEROL, VITAMIN D3, ORAL Take 2 tablets by mouth 2 (two) times daily.    clopidogreL (PLAVIX) 75 mg tablet Take 1 tablet (75 mg total) by mouth once daily.    doxazosin (CARDURA) 4 MG tablet Take 1 tablet (4 mg total) by mouth every 12 (twelve) hours.    famotidine (PEPCID) 20 MG tablet Take 1 tablet (20 mg total) by mouth 2 (two) times daily.    fexofenadine HCl (ALLEGRA ORAL) Take 1 tablet by mouth daily as needed (allergies).    fluticasone furoate-vilanteroL (BREO) 100-25 mcg/dose diskus inhaler Inhale 1 puff into the lungs once daily. Controller. Use this inhaler every day. (Patient not taking: Reported on 2/6/2024)    fluticasone propionate (FLONASE) 50 mcg/actuation nasal spray 1 spray (50 mcg total) by Each Nostril route daily as needed for Allergies.    FLUZONE HIGHDOSE QUAD 23-24  mcg/0.7 mL Syrg     furosemide (LASIX) 80 MG tablet Take 1 tablet (80 mg total) by mouth 2 (two) times a day.    gabapentin (NEURONTIN) 100 MG capsule Take 1 capsule (100 mg total) by mouth as needed (pain).    hydrALAZINE (APRESOLINE) 50 MG tablet TAKE 1 TABLET (50 MG TOTAL) BY MOUTH 3 (THREE) TIMES DAILY.    HYDROcodone-acetaminophen (NORCO) 5-325 mg per tablet Take 1 tablet by mouth every 6 (six) hours as needed for Pain.    isosorbide mononitrate (IMDUR) 30 MG 24 hr tablet Take 4 tablets (120 mg total) by mouth once daily.    metoprolol succinate (TOPROL-XL) 25 MG 24 hr tablet Take 0.5 tablets (12.5 mg total) by mouth once daily.    MITIGARE 0.6 mg Cap TAKE 1 CAPSULE BY MOUTH TWICE A DAY AS NEEDED GOUT FLARE UP TO 3 DAYS     multivitamin (THERAGRAN) per tablet Take 1 tablet by mouth Daily.    NIFEdipine (PROCARDIA-XL) 60 MG (OSM) 24 hr tablet Take 1 tablet (60 mg total) by mouth 2 (two) times a day.    nitroGLYCERIN (NITROSTAT) 0.4 MG SL tablet Place 1 tablet (0.4 mg total) under the tongue every 5 (five) minutes as needed for Chest pain. (Patient not taking: Reported on 2/6/2024)    omeprazole (PRILOSEC) 20 MG capsule Take 1 capsule (20 mg total) by mouth daily as needed (heartburn).    paricalcitoL (ZEMPLAR) 1 MCG capsule TAKE 1 CAPSULE ON MONDAY, WEDNESDAY AND FRIDAY    LOOKCAST [facility],12Y UP,,PF, 30 mcg/0.3 mL injection Inject into the muscle.    potassium chloride (KLOR-CON) 10 MEQ TbSR Take 1 tablet (10 mEq total) by mouth daily as needed (to be taken with lasix).,    predniSONE (DELTASONE) 5 MG tablet Take 1 tablet (5 mg total) by mouth once daily.    pulse oximeter (PULSE OXIMETER) device by Apply Externally route 2 (two) times a day. Use twice daily at 8 AM and 3 PM and [facility] the value [facility] as directed.    sevelamer carbonate (RENVELA) 800 mg Tab Take 1 tablet (800 mg total) by mouth 3 (three) times daily with meals.    sodium polystyrene (KAYEXALATE) powder Take 60 mLs (15 g total) by mouth once daily.  (Using the teaspoon provided, scoop 4 level teaspoonsfuls, add 60 ml of water to powder.  Shake well and drink immediately.)    SPIKEVAX 7929-5315,12Y UP,,PF, 50 mcg/0.5 mL injection     tacrolimus (PROGRAF) 1 MG Cap TAKE 3 CAPSULES (3 MG TOTAL) BY MOUTH EVERY 12 (TWELVE) HOURS.    tiotropium bromide (SPIRIVA RESPIMAT) 1.25 mcg/actuation inhaler Inhale 2 puffs into the lungs once daily. Controller. Use this inhaler every day.    traZODone (DESYREL) 50 MG tablet Take 1 tablet (50 mg total) by mouth nightly as needed for Insomnia.     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     Patient is currently retired   Substance and Sexual Activity    Alcohol use: No    Drug use: No    Sexual activity: Yes     Partners: Female     Review of Systems   Constitutional: Negative for chills, diaphoresis and night sweats.   Cardiovascular:  Negative for chest pain, dyspnea on exertion, irregular heartbeat, leg swelling, near-syncope and palpitations.   Respiratory:  Positive for shortness of breath. Negative for cough and wheezing.    Skin:  Negative for color change and dry skin.   Musculoskeletal:  Negative for joint pain and joint swelling.   Gastrointestinal:  Negative for abdominal pain, diarrhea, nausea and vomiting.   Genitourinary:  Negative for dysuria.   Neurological:  Positive for weakness. Negative for dizziness, headaches and light-headedness.   All other systems reviewed and are negative.    Objective:     Vital Signs (Most Recent):  Temp: 97.8 °F (36.6 °C) (24 07)  Pulse: 65 (24)  Resp: 18 (24 0432)  BP: 127/60 (24)  SpO2: 98 % (24) Vital Signs (24h Range):  Temp:  [97.6 °F (36.4 °C)-98.3 °F (36.8 °C)] 97.8 °F (36.6 °C)  Pulse:  [58-70] 65  Resp:  [17-18] 18  SpO2:  [93 %-98 %] 98 %  BP: (122-142)/(58-65) 127/60     Weight: 88.3 kg (194 lb 10.7 oz)  Body mass index is 25.68 kg/m².    SpO2: 98 %         Intake/Output Summary (Last 24 hours) at 2024 0910  Last data filed at 2024 1752  Gross per 24 hour   Intake 455 ml   Output 250 ml   Net 205 ml       Lines/Drains/Airways       Peripheral Intravenous Line   Duration                  Hemodialysis AV Fistula 02/19/24 0924 Left upper arm 6 days         Peripheral IV - Single Lumen 02/23/24 0559 18 G Posterior;Right Hand 3 days         Peripheral IV - Single Lumen 02/23/24 0802 20 G Anterior;Right Forearm 3 days                     Physical Exam  Constitutional:       General: He is in acute distress.      Appearance: He is ill-appearing. He is not diaphoretic.   HENT:      Head: Normocephalic and atraumatic.      Nose: No congestion.      Mouth/Throat:      Mouth: Mucous membranes are dry.   Eyes:      General: No scleral icterus.     Extraocular Movements: Extraocular movements intact.      Pupils: Pupils are equal, round, and reactive to light.   Cardiovascular:      Rate and Rhythm: Normal rate and regular rhythm.      Heart sounds: No murmur heard.     No gallop.      Comments: Pulses:  Carotid pulses are 2+ on the right side, and 2+ on the left side.  Radial pulses are 1+ on the right side, and 1+ on the left side.   Femoral pulses are 2+ on the right side, and 2+ on the left side.   Dorsalis pedis pulses are 2+ on the right side, and 2+ on the left side.      Pulmonary:      Effort: No respiratory distress.      Breath sounds: No rhonchi or rales.   Chest:      Chest wall: No tenderness.   Abdominal:      General: Bowel sounds are normal. There is no distension.      Tenderness: There is no abdominal tenderness.   Musculoskeletal:         General: No swelling or tenderness.      Cervical back: Normal range of motion. No rigidity.      Comments: Left UE AC Fistula +   Skin:     Capillary Refill: Capillary refill takes less than 2 seconds.      Coloration: Skin is not jaundiced.      Findings: No rash.   Neurological:      General: No focal deficit present.      Mental Status: He is alert and oriented to person, place, and time.   Psychiatric:         Mood and Affect: Mood normal.          Significant Labs: All pertinent lab results from the last 24 hours have been  reviewed.    Significant Imaging: All pertinent imaging have been reviewed.

## 2024-02-26 NOTE — SUBJECTIVE & OBJECTIVE
Subjective:   History of Present Illness:    68 year old male with ESRD s/p kidney transplant x2 (1992, 2005) on chronic immunosuppressive medications Prograf 3mg BID and Prednisone 5mg QD, CAD s/p PCI (2006), HTN, HLD, Chronic diastolic CHF, tachycardia-bradycardia syndrome with periods of pAF admitted to vascular surgery.     C/C; Pt presented with crushing chest pain. Trop elevated to .08. ECG with ST depressions in inferior leads and dynamic changes. Bedside ECHO without WMA. Discussed case with IC staff. Initial plan for LHC although chest pain improved after 3 nitro and morphine.     Last admission; Presented for outpatient fistulogram (recently had the fistula created on 12/18/23) with plans to start HD however had a period of unresponsiveness after the procedure. Pt was drowsy but arousable and able to answer questions after the episode. Wife reports he's had episodes similar to this for about 1.5 years however he has never been evaluated for them. Wife denies associated urinary or fecal incontinence, tongue biting, or whole body shaking associated with these episodes. Pt has an essential tremor and always has BL arm/hand shaking which is at it's baseline per Pt and wife. No personal or FHx seizure. Does not measure his BP or HR at home or during these episodes of unresponsiveness.      KTM consulted for immunosuppressive medication management and need for dialysis.    Mr. Phelps is a 68 y.o. year old male who is status post Kidney Transplant Referral - 10/24/2023 - Declined.    His maintenance immunosuppression consists of:   Immunosuppressants (From admission, onward)      Start     Stop Route Frequency Ordered    02/24/24 0800  tacrolimus capsule 3 mg         -- Oral 2 times daily 02/23/24 1327            Hospital Course:  No notes on file    Interval History:  Had a raised trop I on repeat blood checking, cardiology is advising an angiography, received his first dialysis session on 02/24, HgB 6.4, Scr  7.0, electrolytes non emergent.  Will undergoes another session of dialysis today, Iron supplements(Ferumoxytol), Procrit [id number] after iron stores repletion.  Immunosuppressions dosage will need tapering as immediate stoppage will put him at high risk if acute rejection (3mg BID for first 2 months, then 2mg BID for next two months, then 1mg BID for additional 2 months and at last 1mg QD for 1 month).    Past Medical, Surgical, Family, and Social History:   Unchanged from H&P.    Scheduled Meds:   sodium chloride 0.9%   Intravenous Once    amiodarone  200 mg Oral Daily    aspirin  81 mg Oral Daily    atorvastatin  80 mg Oral Daily    doxazosin  4 mg Oral Q12H    epoetin samara (PROCRIT) injection  10,000 Units Subcutaneous Once    famotidine  20 mg Oral Daily    ferumoxytoL (FERAHEME) 510 mg in dextrose 5 % (D5W) 117 mL IVPB  510 mg Intravenous Once    guaiFENesin  600 mg Oral BID    hydrALAZINE  50 mg Oral Q8H    metoprolol succinate  12.5 mg Oral Daily    multivitamin  1 tablet Oral Daily    mupirocin   Nasal BID    NIFEdipine  60 mg Oral BID    predniSONE  5 mg Oral Daily    sevelamer carbonate  800 mg Oral TID WM    tacrolimus  3 mg Oral BID    tiotropium bromide  2 puff Inhalation Daily     Continuous Infusions:   heparin (porcine) in D5W 12 Units/kg/hr (02/26/24 0624)     PRN Meds:0.9%  NaCl infusion (for blood administration), sodium chloride 0.9%, acetaminophen, albuterol-ipratropium, gabapentin, heparin (PORCINE), heparin (PORCINE), hydrALAZINE, HYDROcodone-acetaminophen, melatonin, nitroGLYCERIN, sodium chloride 0.9%, sodium chloride 0.9%    Intake/Output - Last 3 Shifts         02/24 0700  02/25 0659 02/25 0700 02/26 0659 02/26 0700 02/27 0659    P.O. 1177 455     Total Intake(mL/kg) 1177 (13.3) 455 (5.2)     Urine (mL/kg/hr) 870 (0.4) 250 (0.1)     Emesis/NG output 0      Other 2003      Stool 0 0     Total Output 2873 250     Net -1696 +205            Urine Occurrence 0 x 0 x     Stool Occurrence 0  "x 0 x              Review of Systems   Constitutional:  Positive for fatigue.   HENT: Negative.     Eyes: Negative.    Respiratory:  Positive for chest tightness.    Cardiovascular:  Positive for chest pain.   Endocrine: Negative.    Genitourinary: Negative.    Musculoskeletal: Negative.    Allergic/Immunologic: Negative.    Hematological: Negative.    Psychiatric/Behavioral: Negative.        Objective:     Vital Signs (Most Recent):  Temp: 97.9 °F (36.6 °C) (02/26/24 1119)  Pulse: 89 (02/26/24 1119)  Resp: 20 (02/26/24 1119)  BP: 120/62 (02/26/24 1119)  SpO2: (!) 91 % (02/26/24 1119) Vital Signs (24h Range):  Temp:  [97.6 °F (36.4 °C)-98 °F (36.7 °C)] 97.9 °F (36.6 °C)  Pulse:  [58-89] 89  Resp:  [18-20] 20  SpO2:  [91 %-98 %] 91 %  BP: (120-139)/(57-63) 120/62     Weight: 88 kg (194 lb)  Height: 6' 1" (185.4 cm)  Body mass index is 25.6 kg/m².     Physical Exam  Pulmonary:      Effort: Pulmonary effort is normal.   Abdominal:      Palpations: Abdomen is soft.   Musculoskeletal:         General: Swelling present.      Right lower leg: Edema present.      Left lower leg: Edema present.   Skin:     General: Skin is warm.   Neurological:      General: No focal deficit present.      Mental Status: He is alert and oriented to person, place, and time.   Psychiatric:         Mood and Affect: Mood normal.         Behavior: Behavior normal.          Laboratory:  CBC:   Recent Labs   Lab 02/25/24  0300 02/25/24  1204 02/26/24  0339   WBC 8.43 8.92 6.85   RBC 2.84* 2.78* 2.50*   HGB 7.5* 7.2* 6.4*   HCT 23.4* 22.6* 20.0*   * 114* 114*   MCV 82 81* 80*   MCH 26.4* 25.9* 25.6*   MCHC 32.1 31.9* 32.0     BMP:   Recent Labs   Lab 02/24/24  0533 02/25/24  0300 02/26/24  0339   * 79 75    141 141   K 4.5 3.8 3.6    106 107   CO2 20* 22* 21*   BUN 68* 56* 64*   CREATININE 8.1* 6.6* 7.0*   CALCIUM 7.8* 8.0* 7.6*       Diagnostic Results:  Chest X-Ray: No results found. However, due to the size of the patient " record, not all encounters were searched. Please check Results Review for a complete set of results.  Echo: No results found. However, due to the size of the patient record, not all encounters were searched. Please check Results Review for a complete set of results.

## 2024-02-26 NOTE — ASSESSMENT & PLAN NOTE
Patient's anemia is currently controlled. Has received 1 units of PRBCs on 2/26 . Etiology likely d/t chronic disease due to Chronic Kidney Disease/ESRD  Current CBC reviewed-   Lab Results   Component Value Date    HGB 8.2 (L) 02/29/2024    HCT 25.1 (L) 02/29/2024     Monitor serial CBC and transfuse if patient becomes hemodynamically unstable, symptomatic or H/H drops below 7/21.

## 2024-02-27 LAB
ANION GAP SERPL CALC-SCNC: 14 MMOL/L (ref 8–16)
APTT PPP: 106.7 SEC (ref 21–32)
APTT PPP: 28.4 SEC (ref 21–32)
APTT PPP: 36.2 SEC (ref 21–32)
BASOPHILS # BLD AUTO: 0 K/UL (ref 0–0.2)
BASOPHILS NFR BLD: 0 % (ref 0–1.9)
BUN SERPL-MCNC: 32 MG/DL (ref 8–23)
CALCIUM SERPL-MCNC: 8.6 MG/DL (ref 8.7–10.5)
CHLORIDE SERPL-SCNC: 104 MMOL/L (ref 95–110)
CO2 SERPL-SCNC: 24 MMOL/L (ref 23–29)
CREAT SERPL-MCNC: 4.2 MG/DL (ref 0.5–1.4)
DIFFERENTIAL METHOD BLD: ABNORMAL
EOSINOPHIL # BLD AUTO: 0 K/UL (ref 0–0.5)
EOSINOPHIL NFR BLD: 0 % (ref 0–8)
ERYTHROCYTE [DISTWIDTH] IN BLOOD BY AUTOMATED COUNT: 17.2 % (ref 11.5–14.5)
EST. GFR  (NO RACE VARIABLE): 14.6 ML/MIN/1.73 M^2
GLUCOSE SERPL-MCNC: 150 MG/DL (ref 70–110)
HCT VFR BLD AUTO: 26.8 % (ref 40–54)
HGB BLD-MCNC: 8.8 G/DL (ref 14–18)
IMM GRANULOCYTES # BLD AUTO: 0.02 K/UL (ref 0–0.04)
IMM GRANULOCYTES NFR BLD AUTO: 0.4 % (ref 0–0.5)
LYMPHOCYTES # BLD AUTO: 0.2 K/UL (ref 1–4.8)
LYMPHOCYTES NFR BLD: 4.3 % (ref 18–48)
MAGNESIUM SERPL-MCNC: 1.7 MG/DL (ref 1.6–2.6)
MCH RBC QN AUTO: 26.2 PG (ref 27–31)
MCHC RBC AUTO-ENTMCNC: 32.8 G/DL (ref 32–36)
MCV RBC AUTO: 80 FL (ref 82–98)
MONOCYTES # BLD AUTO: 0 K/UL (ref 0.3–1)
MONOCYTES NFR BLD: 0.8 % (ref 4–15)
NEUTROPHILS # BLD AUTO: 4.8 K/UL (ref 1.8–7.7)
NEUTROPHILS NFR BLD: 94.5 % (ref 38–73)
NRBC BLD-RTO: 0 /100 WBC
OHS QRS DURATION: 106 MS
OHS QTC CALCULATION: 497 MS
PHOSPHATE SERPL-MCNC: 2.8 MG/DL (ref 2.7–4.5)
PLATELET # BLD AUTO: 131 K/UL (ref 150–450)
PMV BLD AUTO: ABNORMAL FL (ref 9.2–12.9)
POCT GLUCOSE: 100 MG/DL (ref 70–110)
POCT GLUCOSE: 120 MG/DL (ref 70–110)
POCT GLUCOSE: 137 MG/DL (ref 70–110)
POTASSIUM SERPL-SCNC: 4.1 MMOL/L (ref 3.5–5.1)
RBC # BLD AUTO: 3.36 M/UL (ref 4.6–6.2)
SODIUM SERPL-SCNC: 142 MMOL/L (ref 136–145)
WBC # BLD AUTO: 5.08 K/UL (ref 3.9–12.7)

## 2024-02-27 PROCEDURE — 25000242 PHARM REV CODE 250 ALT 637 W/ HCPCS: Performed by: STUDENT IN AN ORGANIZED HEALTH CARE EDUCATION/TRAINING PROGRAM

## 2024-02-27 PROCEDURE — 80048 BASIC METABOLIC PNL TOTAL CA: CPT | Performed by: STUDENT IN AN ORGANIZED HEALTH CARE EDUCATION/TRAINING PROGRAM

## 2024-02-27 PROCEDURE — 25000003 PHARM REV CODE 250: Performed by: STUDENT IN AN ORGANIZED HEALTH CARE EDUCATION/TRAINING PROGRAM

## 2024-02-27 PROCEDURE — 85730 THROMBOPLASTIN TIME PARTIAL: CPT | Mod: 91 | Performed by: STUDENT IN AN ORGANIZED HEALTH CARE EDUCATION/TRAINING PROGRAM

## 2024-02-27 PROCEDURE — 83735 ASSAY OF MAGNESIUM: CPT | Performed by: STUDENT IN AN ORGANIZED HEALTH CARE EDUCATION/TRAINING PROGRAM

## 2024-02-27 PROCEDURE — 93010 ELECTROCARDIOGRAM REPORT: CPT | Mod: ,,, | Performed by: INTERNAL MEDICINE

## 2024-02-27 PROCEDURE — 94761 N-INVAS EAR/PLS OXIMETRY MLT: CPT

## 2024-02-27 PROCEDURE — 36415 COLL VENOUS BLD VENIPUNCTURE: CPT | Performed by: STUDENT IN AN ORGANIZED HEALTH CARE EDUCATION/TRAINING PROGRAM

## 2024-02-27 PROCEDURE — 63600175 PHARM REV CODE 636 W HCPCS

## 2024-02-27 PROCEDURE — 63600175 PHARM REV CODE 636 W HCPCS: Performed by: STUDENT IN AN ORGANIZED HEALTH CARE EDUCATION/TRAINING PROGRAM

## 2024-02-27 PROCEDURE — 84100 ASSAY OF PHOSPHORUS: CPT | Performed by: STUDENT IN AN ORGANIZED HEALTH CARE EDUCATION/TRAINING PROGRAM

## 2024-02-27 PROCEDURE — 86480 TB TEST CELL IMMUN MEASURE: CPT | Performed by: NURSE PRACTITIONER

## 2024-02-27 PROCEDURE — 25000003 PHARM REV CODE 250: Performed by: INTERNAL MEDICINE

## 2024-02-27 PROCEDURE — 85025 COMPLETE CBC W/AUTO DIFF WBC: CPT | Performed by: STUDENT IN AN ORGANIZED HEALTH CARE EDUCATION/TRAINING PROGRAM

## 2024-02-27 PROCEDURE — 99233 SBSQ HOSP IP/OBS HIGH 50: CPT | Mod: ,,, | Performed by: THORACIC SURGERY (CARDIOTHORACIC VASCULAR SURGERY)

## 2024-02-27 PROCEDURE — 93005 ELECTROCARDIOGRAM TRACING: CPT

## 2024-02-27 PROCEDURE — 25000003 PHARM REV CODE 250

## 2024-02-27 PROCEDURE — 20600001 HC STEP DOWN PRIVATE ROOM

## 2024-02-27 PROCEDURE — 99900035 HC TECH TIME PER 15 MIN (STAT)

## 2024-02-27 PROCEDURE — 94640 AIRWAY INHALATION TREATMENT: CPT

## 2024-02-27 RX ORDER — PREDNISONE 50 MG/1
50 TABLET ORAL SEE ADMIN INSTRUCTIONS
Status: DISCONTINUED | OUTPATIENT
Start: 2024-02-27 | End: 2024-03-01

## 2024-02-27 RX ORDER — SODIUM CHLORIDE 0.9 % (FLUSH) 0.9 %
10 SYRINGE (ML) INJECTION
Status: DISCONTINUED | OUTPATIENT
Start: 2024-02-27 | End: 2024-03-02 | Stop reason: HOSPADM

## 2024-02-27 RX ORDER — PREDNISONE 50 MG/1
50 TABLET ORAL EVERY 6 HOURS
Status: COMPLETED | OUTPATIENT
Start: 2024-02-28 | End: 2024-02-29

## 2024-02-27 RX ORDER — FAMOTIDINE 20 MG/1
20 TABLET, FILM COATED ORAL 2 TIMES DAILY
Status: DISCONTINUED | OUTPATIENT
Start: 2024-02-27 | End: 2024-02-27

## 2024-02-27 RX ORDER — FAMOTIDINE 20 MG/1
20 TABLET, FILM COATED ORAL EVERY 6 HOURS
Status: COMPLETED | OUTPATIENT
Start: 2024-02-27 | End: 2024-02-28

## 2024-02-27 RX ORDER — DIPHENHYDRAMINE HCL 50 MG
50 CAPSULE ORAL ONCE
Status: CANCELLED | OUTPATIENT
Start: 2024-02-27 | End: 2024-02-27

## 2024-02-27 RX ORDER — DIPHENHYDRAMINE HCL 50 MG
50 CAPSULE ORAL EVERY 6 HOURS
Status: DISPENSED | OUTPATIENT
Start: 2024-02-28 | End: 2024-02-29

## 2024-02-27 RX ORDER — DIPHENHYDRAMINE HCL 50 MG
50 CAPSULE ORAL EVERY 6 HOURS
Status: DISCONTINUED | OUTPATIENT
Start: 2024-02-27 | End: 2024-02-27

## 2024-02-27 RX ADMIN — HEPARIN SODIUM AND DEXTROSE 12 UNITS/KG/HR: 10000; 5 INJECTION INTRAVENOUS at 03:02

## 2024-02-27 RX ADMIN — ASPIRIN 81 MG: 81 TABLET, COATED ORAL at 09:02

## 2024-02-27 RX ADMIN — ATORVASTATIN CALCIUM 80 MG: 20 TABLET, FILM COATED ORAL at 09:02

## 2024-02-27 RX ADMIN — MUPIROCIN: 20 OINTMENT TOPICAL at 09:02

## 2024-02-27 RX ADMIN — DOXAZOSIN 4 MG: 2 TABLET ORAL at 09:02

## 2024-02-27 RX ADMIN — GUAIFENESIN 600 MG: 600 TABLET, EXTENDED RELEASE ORAL at 09:02

## 2024-02-27 RX ADMIN — SEVELAMER CARBONATE 800 MG: 800 TABLET, FILM COATED ORAL at 09:02

## 2024-02-27 RX ADMIN — HYDRALAZINE HYDROCHLORIDE 50 MG: 50 TABLET ORAL at 09:02

## 2024-02-27 RX ADMIN — METOPROLOL SUCCINATE 12.5 MG: 25 TABLET, EXTENDED RELEASE ORAL at 09:02

## 2024-02-27 RX ADMIN — TACROLIMUS 3 MG: 1 CAPSULE ORAL at 09:02

## 2024-02-27 RX ADMIN — SEVELAMER CARBONATE 800 MG: 800 TABLET, FILM COATED ORAL at 05:02

## 2024-02-27 RX ADMIN — TACROLIMUS 3 MG: 1 CAPSULE ORAL at 05:02

## 2024-02-27 RX ADMIN — FAMOTIDINE 20 MG: 20 TABLET, FILM COATED ORAL at 09:02

## 2024-02-27 RX ADMIN — HYDRALAZINE HYDROCHLORIDE 50 MG: 50 TABLET ORAL at 03:02

## 2024-02-27 RX ADMIN — DIPHENHYDRAMINE HYDROCHLORIDE 50 MG: 50 CAPSULE ORAL at 05:02

## 2024-02-27 RX ADMIN — NITROGLYCERIN 0.4 MG: 0.4 TABLET, ORALLY DISINTEGRATING SUBLINGUAL at 12:02

## 2024-02-27 RX ADMIN — TIOTROPIUM BROMIDE INHALATION SPRAY 2 PUFF: 1.56 SPRAY, METERED RESPIRATORY (INHALATION) at 09:02

## 2024-02-27 RX ADMIN — NIFEDIPINE 60 MG: 60 TABLET, FILM COATED, EXTENDED RELEASE ORAL at 09:02

## 2024-02-27 RX ADMIN — AMIODARONE HYDROCHLORIDE 200 MG: 200 TABLET ORAL at 09:02

## 2024-02-27 RX ADMIN — SEVELAMER CARBONATE 800 MG: 800 TABLET, FILM COATED ORAL at 12:02

## 2024-02-27 RX ADMIN — FAMOTIDINE 20 MG: 20 TABLET ORAL at 09:02

## 2024-02-27 RX ADMIN — HYDRALAZINE HYDROCHLORIDE 50 MG: 50 TABLET ORAL at 06:02

## 2024-02-27 RX ADMIN — THERA TABS 1 TABLET: TAB at 09:02

## 2024-02-27 RX ADMIN — HEPARIN SODIUM AND DEXTROSE 15 UNITS/KG/HR: 10000; 5 INJECTION INTRAVENOUS at 04:02

## 2024-02-27 RX ADMIN — PREDNISONE 5 MG: 2.5 TABLET ORAL at 09:02

## 2024-02-27 NOTE — PROGRESS NOTES
SW received notification pt in need of outpt dialysis upon discharge.    Requested TB quantiferon gold for TB rule out.     SC sent to Emre Latif MD to inform which dialysis units she is a provider at and attempt to keep pt with following nephrologist.    SW to continue to follow with updates.    UPDATE 11:49AM: Ludwin Latif MD, does not service any dialysis units in the [place] area.     SW spoke with pt's spouse via phone to discuss outpt dialysis coordination. Per spouse, pt was on dialysis >20 years ago. Per spouse, pt was previously established at an [facility] dialysis unit. This SW unaware of any [facility] dialysis units that existed >20 years ago. Per spouse, pt/pt's spouse prefer dialysis referral is sent to dialysis units closest to their home address. Per spouse, pt prefers MWF 1st shift availability. SW discussed pt's referral is subject to chair availability. Pt's spouse V/U.    SW to send pt's referral to [facility] intake and [facility] central intake requesting placement at closest units to pt's home and preferred schedule of MWF 1st shift. Pt will require TB rule out in order for outpt chair to be confirmed.    UPDATE 2:00PM: SW received call from [facility] [place] clinic manager Margarita. Per Margarita, [facility] does not currently have availability. Per Margarita, pt's referral is being rerouted to [facility] for placement with MD Immanuel.    SW to continue to follow with updates.    Jeanna Rodriguez, ROLANDO  [facility]  [phone]

## 2024-02-27 NOTE — NURSING
Patient arrived to unit via bed transported by sending nurse. Patient AAOx4, VS stable. Vasc band to the right wrist, pulse palpable, no bleeding or hematoma noted.

## 2024-02-27 NOTE — PROGRESS NOTES
02/26/24 2100   During Hemodialysis Assessment   Blood Flow Rate (mL/min) 300 mL/min   Dialysate Flow Rate (mL/min) 600 ml/min   Ultrafiltration Rate (mL/Hr) 1000 mL/Hr   Arteriovenous Lines Secure Yes   Arterial Pressure (mmHg) -120 mmHg   Venous Pressure (mmHg) 170   Blood Volume Processed (Liters) 0 L   UF Removed (mL) 0 mL   TMP 50   Venous Line in Air Detector Yes   Intake (mL) 250 mL   Intra-Hemodialysis Comments HD initiated     Report received from primary RN. HD started via [facility]IRA using 16 ga needles.

## 2024-02-27 NOTE — PROGRESS NOTES
02/27/24 0054   Post-Hemodialysis Assessment   Rinseback Volume (mL) 250 mL   Blood Volume Processed (Liters) 52.7 L   Dialyzer Clearance Lightly streaked   Duration of Treatment 180 minutes   Additional Fluid Intake (mL) 500 mL   Total UF (mL) 3000 mL   Net Fluid Removal 2500   Patient Response to Treatment chelsea well   Post-Hemodialysis Comments stable vs     HD completed per MD order. Needles removed and hemostasis achievedand report given to primary RN.

## 2024-02-27 NOTE — PLAN OF CARE
.KTM Chart Review    Interval Hx;  Received 2nd session of HD yesterday with net removal of 2.5 liter fluid, UOP 750ml, lytes stable, will keep him on current immunosuppresant dose.    Intake/Output Summary (Last 24 hours) at 2/27/2024 1305  Last data filed at 2/27/2024 0122  Gross per 24 hour   Intake 881.67 ml   Output 3750 ml   Net -2868.33 ml       Vitals:    02/27/24 1000 02/27/24 1116 02/27/24 1128 02/27/24 1200   BP:   134/63    BP Location:       Patient Position:   Lying    Pulse: 65 60 68 (!) 58   Resp:   18    Temp:   98.7 °F (37.1 °C)    TempSrc:   Oral    SpO2:   96%    Weight:       Height:           Recent Labs   Lab 02/25/24  0300 02/26/24  0339 02/27/24  0148    141 142   K 3.8 3.6 4.1    107 104   CO2 22* 21* 24   BUN 56* 64* 32*   CREATININE 6.6* 7.0* 4.2*   CALCIUM 8.0* 7.6* 8.6*   PHOS 3.7 3.2 2.8         No other related issues identified. Please call KTM as needed; We will continue to follow.    Cece James   Renal Transplant Fellow

## 2024-02-27 NOTE — ASSESSMENT & PLAN NOTE
CTS consulted for CABG evaluation. Unfortunately patient has poor targets and multiple comorbidities. Not a surgical candidate. Recommend PCI. Staffed with Dr. Mitchell.

## 2024-02-27 NOTE — HPI
Mr. Phelps is a a 69 yo male with a PMHx of paroxysmal atrial fibrillation, tachy-loan syndrome, HFpEF with most recent LVEF 65%, CAD s/p PCI 2006, HTN, HLD, aortic atherosclerosis, secondary hyperparathyroidism, diverticulosis, COPD, GERD, hypothyroidism, gout, a history of tobacco use, obesity, ESRD s/p kidney transplant x2 (1992, 2005) on chronic immunosuppressive medication who presents with chest pain.      Recent admission and discharge 2/21/24. Syncope after vascular procedure. No syncopal episodes or events on telemetry were noted after admission. IV Lasix was given noting BNP > 2000 and overload on exam.     In the ED his HR was in 60-70s, -180s. Plts 121. K 2.9, Cr 7.8. Trop .08. BNP 1929. LA 1.71. CXr with pulmonary edema and pleural effusion on the right. ECF with AF with RVR with ST depressions in inferior leads and non-specific changes. Pt complaining of crushing chest pain which started around 3 and continued until he got here. PT given Nitro in ED x3 and morphine twice.

## 2024-02-27 NOTE — CONSULTS
[facility] - Cardiology Stepdown  Cardiothoracic Surgery  Consult Note    Patient Name: Ibrahima Phelps  MRN: [id number]  Admission Date: 2/23/2024  Attending Physician: Yamil Silva DO  Referring Provider: Self, Aaareferral    Patient information was obtained from patient, past medical records, and ER records.     Inpatient consult to Cardiothoracic Surgery  Consult performed by: Itzel Kapoor PA-C  Consult ordered by: Yamil Silva DO        Subjective:     Principal Problem: NSTEMI (non-ST elevated myocardial infarction)    History of Present Illness: Mr. Phelps is a a 69 yo male with a PMHx of paroxysmal atrial fibrillation, tachy-loan syndrome, HFpEF with most recent LVEF 65%, CAD s/p PCI 2006, HTN, HLD, aortic atherosclerosis, secondary hyperparathyroidism, diverticulosis, COPD, GERD, hypothyroidism, gout, a history of tobacco use, obesity, ESRD s/p kidney transplant x2 (1992, 2005) on chronic immunosuppressive medication who presents with chest pain.      Recent admission and discharge 2/21/24. Syncope after vascular procedure. No syncopal episodes or events on telemetry were noted after admission. IV Lasix was given noting BNP > 2000 and overload on exam.     In the ED his HR was in 60-70s, -180s. Plts 121. K 2.9, Cr 7.8. Trop .08. BNP 1929. LA 1.71. CXr with pulmonary edema and pleural effusion on the right. ECF with AF with RVR with ST depressions in inferior leads and non-specific changes. Pt complaining of crushing chest pain which started around 2 and continued until he got here. PT given Nitro in ED x3 and morphine twice.        Scheduled Meds:   sodium chloride 0.9%   Intravenous Once    amiodarone  200 mg Oral Daily    aspirin  81 mg Oral Daily    atorvastatin  80 mg Oral Daily    doxazosin  4 mg Oral Q12H    famotidine  20 mg Oral Daily    guaiFENesin  600 mg Oral BID    hydrALAZINE  50 mg Oral Q8H    metoprolol succinate  12.5 mg Oral Daily    multivitamin  1 tablet Oral Daily     mupirocin   Nasal BID    NIFEdipine  60 mg Oral BID    predniSONE  5 mg Oral Daily    sevelamer carbonate  800 mg Oral TID WM    tacrolimus  3 mg Oral BID    tiotropium bromide  2 puff Inhalation Daily     Continuous Infusions:   heparin (porcine) in D5W 12 Units/kg/hr (02/26/24 1833)     PRN Meds:.0.9%  NaCl infusion (for blood administration), sodium chloride 0.9%, acetaminophen, albuterol-ipratropium, fentaNYL, gabapentin, heparin (porcine), heparin (PORCINE), heparin (PORCINE), heparin (porcine), hydrALAZINE, HYDROcodone-acetaminophen, iohexoL, LIDOcaine HCL 20 mg/ml (2%), melatonin, methylPREDNISolone sodium succinate, midazolam, nitroGLYCERIN in D5W 200 mcg/mL, nitroGLYCERIN, sodium chloride 0.9%, sodium chloride 0.9%    Review of patient's allergies indicates:   Allergen Reactions    Ace inhibitors Swelling    Verapamil Other (See Comments)     Other reaction(s): Unknown    Povidone-iodine Itching       Past Medical History:   Diagnosis Date    Atrial fibrillation     CAD (coronary artery disease) 2006    MI in 2006    CHF (congestive heart failure) 2017    CKD (chronic kidney disease) stage 3, GFR 30-59 ml/min     Dr. Latif.  in transplanted kidney    CKD (chronic kidney disease) stage 5, GFR less than 15 ml/min 02/02/2024    COVID-19 02/07/2023    Diverticulosis     Hyperlipidemia     Hypertension     Keloid cicatrix     Metabolic bone disease     Other emphysema 10/01/2019    Pericarditis     S/P kidney transplant 1992    x2 (1992 & 2005),    Thrombocytopenia     Thyroid disease     Tobacco use 09/05/2014     Past Surgical History:   Procedure Laterality Date    AV FISTULA PLACEMENT Left 12/18/2023    Procedure: CREATION, AV FISTULA;  Surgeon: JUANI Melendez III, MD;  Location: [facility] OR [address] [place] [address];  Service: Vascular;  Laterality: Left;  LUE AVF creation vs AVG insertion    CARDIOVERSION  04/30/15    CHOLECYSTECTOMY      COLONOSCOPY  April 20, 2011    Diverticulosis, repeat recommended in 3 yrs., repeat  "colonoscopy 2014 revealed 2 polyps.  He should return in 5 years.    COLONOSCOPY N/A 3/13/2020    Procedure: COLONOSCOPY;  Surgeon: Chon Casper MD;  Location: [facility] (4TH FLR);  Service: Endoscopy;  Laterality: N/A;  ok to hold coumadin x5days-see telephone encounter 2/4/20-tb    COLONOSCOPY N/A 2/4/2021    Procedure: COLONOSCOPY;  Surgeon: Chon Casper MD;  Location: [facility] (4TH FLR);  Service: Endoscopy;  Laterality: N/A;  Eliquis - per Dr. GAVIN Blunt ok to hold x 2 days and "restarted asap"- ERW  last prep "poor", [id number] ordered/ Pt requests Dr. Casper  prep ins. mailed - COVID screening on 2/1/21 PCW- ERW    COLONOSCOPY N/A 3/3/2021    Procedure: COLONOSCOPY;  Surgeon: Chon Casper MD;  Location: [facility] (4TH FLR);  Service: Endoscopy;  Laterality: N/A;  Patient with his second poor bowel pre and has poor prep today.  If patient not intersted or can't get colonoscopy tomorrow will need constipation bowel prep and will need to restart his Eliquis today.Thanks,Chon     per Dr Blunt-ok to hold Eliquis 2 days prior      COVID     CORONARY ANGIOPLASTY WITH STENT PLACEMENT  9/13/2006    FISTULOGRAM N/A 2/19/2024    Procedure: Fistulogram;  Surgeon: JUANI Melendez III, MD;  Location: [facility] CATH LAB;  Service: Peripheral Vascular;  Laterality: N/A;    IRRIGATION AND DEBRIDEMENT Right 8/30/2023    Procedure: IRRIGATION AND DEBRIDEMENT, RIGHT MIDDLE FINGER;  Surgeon: Martin Larsen MD;  Location: [facility] OR 2ND FLR;  Service: Orthopedics;  Laterality: Right;    KIDNEY TRANSPLANT  2005    PARATHYROIDECTOMY      PERCUTANEOUS TRANSLUMINAL ANGIOPLASTY OF ARTERIOVENOUS FISTULA Left 2/19/2024    Procedure: PTA, AV FISTULA;  Surgeon: JUANI Melendez III, MD;  Location: [facility] CATH LAB;  Service: Peripheral Vascular;  Laterality: Left;    TREATMENT OF CARDIAC ARRHYTHMIA N/A 3/28/2022    Procedure: CARDIOVERSION;  Surgeon: Migue Blunt MD;  Location: [facility] EP LAB;  Service: Cardiology;  " Laterality: N/A;  AF, DCC, MAC, GP, 3 PREP*RODRIGO deferred pt 100% compliant*     Family History       Problem Relation (Age of Onset)    Heart disease Father    Kidney disease Sister, Brother    Kidney failure Sister, Brother    No Known Problems Sister, Brother, Sister, Sister    Thyroid disease Mother          Tobacco Use    Smoking status: Every Day     Current packs/day: 0.00     Average packs/day: 0.5 packs/day for 40.0 years (20.0 ttl pk-yrs)     Types: Cigarettes     Start date: 1982     Last attempt to quit: 2022     Years since quittin.9    Smokeless tobacco: Never    Tobacco comments:     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     Patient is currently retired   Substance and Sexual Activity    Alcohol use: No    Drug use: No    Sexual activity: Yes     Partners: Female     Review of Systems   Constitutional:  Positive for activity change.   HENT:  Negative for nosebleeds.    Eyes:  Negative for visual disturbance.   Respiratory:  Negative for shortness of breath.    Cardiovascular:  Negative for chest pain.   Gastrointestinal:  Negative for nausea.   Musculoskeletal:  Negative for gait problem.   Skin:  Negative for color change.   Neurological:  Positive for syncope.   Hematological:  Does not bruise/bleed easily.   Psychiatric/Behavioral:  Negative for sleep disturbance.      Objective:     Vital Signs (Most Recent):  Temp: 98.7 °F (37.1 °C) (24 1128)  Pulse: 68 (24 1128)  Resp: 18 (24 1128)  BP: 134/63 (24 1128)  SpO2: 96 % (24 1128) Vital Signs (24h Range):  Temp:  [97.6 °F (36.4 °C)-98.7 °F (37.1 °C)] 98.7 °F (37.1 °C)  Pulse:  [] 68  Resp:  [16-20] 18  SpO2:  [92 %-99 %] 96 %  BP: (127-159)/(61-90) 134/63     Weight: 88 kg (194 lb)  Body mass index is 25.6 kg/m².    SpO2: 96 %        Intake/Output - Last 3 Shifts          0700   0659  07 0659  07 0659    P.O. 455      Blood   "381.7     Other  500     Total Intake(mL/kg) 455 (5.2) 881.7 (10)     Urine (mL/kg/hr) 250 (0.1) 750 (0.4)     Emesis/NG output       Other  3000     Stool 0      Total Output 250 3750     Net +205 -2868.3            Urine Occurrence 0 x      Stool Occurrence 0 x               Lines/Drains/Airways       Peripheral Intravenous Line  Duration                  Hemodialysis AV Fistula 02/19/24 0924 Left upper arm 8 days         Peripheral IV - Single Lumen 02/23/24 0802 20 G Anterior;Right Forearm 4 days                        Physical Exam  Constitutional:       General: He is not in acute distress.  HENT:      Head: Normocephalic and atraumatic.   Eyes:      Pupils: Pupils are equal, round, and reactive to light.   Cardiovascular:      Rate and Rhythm: Normal rate.   Pulmonary:      Effort: No respiratory distress.   Musculoskeletal:         General: Normal range of motion.      Cervical back: Normal range of motion.   Skin:     Coloration: Skin is not pale.   Neurological:      Mental Status: He is alert. Mental status is at baseline.   Psychiatric:         Mood and Affect: Mood normal.            Significant Labs:  ABGs: No results for input(s): "PH", "PCO2", "PO2", "HCO3", "POCSATURATED", "BE" in the last 48 hours.  Amylase: No results for input(s): "AMYLASE" in the last 48 hours.  BMP:   Recent Labs   Lab 02/27/24  0148   *      K 4.1      CO2 24   BUN 32*   CREATININE 4.2*   CALCIUM 8.6*   MG 1.7     Cardiac markers:   Recent Labs   Lab 02/26/24  0741   TROPONINI 10.660*     CBC:   Recent Labs   Lab 02/27/24  0148   WBC 5.08   RBC 3.36*   HGB 8.8*   HCT 26.8*   *   MCV 80*   MCH 26.2*   MCHC 32.8     CMP:   Recent Labs   Lab 02/27/24  0148   *   CALCIUM 8.6*      K 4.1   CO2 24      BUN 32*   CREATININE 4.2*     Coagulation:   Recent Labs   Lab 02/25/24  1204 02/25/24  1811 02/27/24  0148   INR 1.2  --   --    APTT 26.8   < > 36.2*    < > = values in this interval not " "displayed.     Lactic Acid: No results for input(s): "LACTATE" in the last 48 hours.  LFTs: No results for input(s): "ALT", "AST", "ALKPHOS", "BILITOT", "PROT", "ALBUMIN" in the last 48 hours.  Lipase: No results for input(s): "LIPASE" in the last 48 hours.    Significant Diagnostics: reviewed   [facility] 2/26/24    The Prox LAD lesion was 60% stenosed.    The Mid LAD lesion was 70% stenosed.    The Prox RCA lesion was 95% stenosed.    The 1st Mrg lesion was 70% stenosed.    The 2nd Mrg lesion was 70% stenosed.    The estimated blood loss was none.    There was three vessel coronary artery disease.    TTE 2/26/24    Left Ventricle: There is normal systolic function with a visually estimated ejection fraction of 60 - 65%. Grade II diastolic dysfunction. Elevated left ventricular filling pressure.    Right Ventricle: Normal right ventricular cavity size. Wall thickness is normal. Right ventricle wall motion  is normal. Systolic function is normal.    Left Atrium: Left atrium is moderately dilated.    Right Atrium: Right atrium is moderately dilated.    Aortic Valve: The aortic valve is a trileaflet valve. There is moderate aortic valve sclerosis. There is moderate annular calcification present. There is mild to moderate aortic regurgitation. There appers to be mobile echodensoty attached to the base of the non coronary leaflet that is likely nodular calcificaiton and is tthe site of origin of Aortic regurgitation. This appears unchanged from pror TTE. AV leaflets appears thickened in some views cannot exlude presence of endocarditis (clip # 5).    Aortic Valve: Mildly restricted motion. There is mild stenosis. Aortic valve area by VTI is 1.62 cm². Aortic valve peak velocity is 2.62 m/s. Mean gradient is 14 mmHg. The dimensionless index is 0.47.    Mitral Valve: There is moderate mitral annular calcification present. There is mild to moderate regurgitation.    Aorta: Aortic root is normal in size measuring 3.61 cm. Ascending " aorta is normal measuring 2.80 cm.    Pulmonary Artery: The estimated pulmonary artery systolic pressure is 49 mmHg.    IVC/SVC: Intermediate venous pressure at 8 mmHg.        Assessment/Plan:       * NSTEMI (non-ST elevated myocardial infarction)  CTS consulted for CABG evaluation. Unfortunately patient has poor targets and multiple comorbidities. Not a surgical candidate. Recommend PCI. Staffed with Dr. Mitchell.       Thank you for your consult.     Itzel Kapoor PA-C  Cardiothoracic Surgery  [facility] - Cardiology Stepdown    Patient seen and pertinent studies reviewed. I have talked to Dr Lincoln about the case. Patient has diffuse disease and would benefit from PCI and medical management rather than CABG. Thank you for involving me in the care of this patient.

## 2024-02-27 NOTE — SUBJECTIVE & OBJECTIVE
Scheduled Meds:   sodium chloride 0.9%   Intravenous Once    amiodarone  200 mg Oral Daily    aspirin  81 mg Oral Daily    atorvastatin  80 mg Oral Daily    doxazosin  4 mg Oral Q12H    famotidine  20 mg Oral Daily    guaiFENesin  600 mg Oral BID    hydrALAZINE  50 mg Oral Q8H    metoprolol succinate  12.5 mg Oral Daily    multivitamin  1 tablet Oral Daily    mupirocin   Nasal BID    NIFEdipine  60 mg Oral BID    predniSONE  5 mg Oral Daily    sevelamer carbonate  800 mg Oral TID WM    tacrolimus  3 mg Oral BID    tiotropium bromide  2 puff Inhalation Daily     Continuous Infusions:   heparin (porcine) in D5W 12 Units/kg/hr (02/26/24 1833)     PRN Meds:.0.9%  NaCl infusion (for blood administration), sodium chloride 0.9%, acetaminophen, albuterol-ipratropium, fentaNYL, gabapentin, heparin (porcine), heparin (PORCINE), heparin (PORCINE), heparin (porcine), hydrALAZINE, HYDROcodone-acetaminophen, iohexoL, LIDOcaine HCL 20 mg/ml (2%), melatonin, methylPREDNISolone sodium succinate, midazolam, nitroGLYCERIN in D5W 200 mcg/mL, nitroGLYCERIN, sodium chloride 0.9%, sodium chloride 0.9%    Review of patient's allergies indicates:   Allergen Reactions    Ace inhibitors Swelling    Verapamil Other (See Comments)     Other reaction(s): Unknown    Povidone-iodine Itching       Past Medical History:   Diagnosis Date    Atrial fibrillation     CAD (coronary artery disease) 2006    MI in 2006    CHF (congestive heart failure) 2017    CKD (chronic kidney disease) stage 3, GFR 30-59 ml/min     Dr. Latif.  in transplanted kidney    CKD (chronic kidney disease) stage 5, GFR less than 15 ml/min 02/02/2024    COVID-19 02/07/2023    Diverticulosis     Hyperlipidemia     Hypertension     Keloid cicatrix     Metabolic bone disease     Other emphysema 10/01/2019    Pericarditis     S/P kidney transplant 1992    x2 (1992 & 2005),    Thrombocytopenia     Thyroid disease     Tobacco use 09/05/2014     Past Surgical History:   Procedure  "Laterality Date    AV FISTULA PLACEMENT Left 12/18/2023    Procedure: CREATION, AV FISTULA;  Surgeon: JUANI Melendez III, MD;  [facility];  Service: Vascular;  Laterality: Left;  LUE AVF creation vs AVG insertion    CARDIOVERSION  04/30/15    CHOLECYSTECTOMY      COLONOSCOPY  April 20, 2011    Diverticulosis, repeat recommended in 3 yrs., repeat colonoscopy 2014 revealed 2 polyps.  He should return in 5 years.    COLONOSCOPY N/A 3/13/2020    Procedure: COLONOSCOPY;  Surgeon: Chon Casper MD;  Location: [facility] (4TH FLR);  Service: Endoscopy;  Laterality: N/A;  ok to hold coumadin x5days-see telephone encounter 2/4/20-tb    COLONOSCOPY N/A 2/4/2021    Procedure: COLONOSCOPY;  Surgeon: Chon Casper MD;  Location: [facility] (4TH FLR);  Service: Endoscopy;  Laterality: N/A;  Eliquis - per Dr. GAVIN Blunt ok to hold x 2 days and "restarted asap"- ERW  last prep "poor", [id number] ordered/ Pt requests Dr. Casper  prep ins. mailed - COVID screening on 2/1/21 PCW- ERW    COLONOSCOPY N/A 3/3/2021    Procedure: COLONOSCOPY;  Surgeon: Chon Casper MD;  Location: [facility] (4TH FLR);  Service: Endoscopy;  Laterality: N/A;  Patient with his second poor bowel pre and has poor prep today.  If patient not intersted or can't get colonoscopy tomorrow will need constipation bowel prep and will need to restart his Eliquis today.Thanks,Chon     per Dr Blunt-ok to hold Eliquis 2 days prior      COVID     CORONARY ANGIOPLASTY WITH STENT PLACEMENT  9/13/2006    FISTULOGRAM N/A 2/19/2024    Procedure: Fistulogram;  Surgeon: JUANI Melendez III, MD;  Location: [facility] CATH LAB;  Service: Peripheral Vascular;  Laterality: N/A;    IRRIGATION AND DEBRIDEMENT Right 8/30/2023    Procedure: IRRIGATION AND DEBRIDEMENT, RIGHT MIDDLE FINGER;  Surgeon: Martin Larsen MD;  Location: [facility] OR [facility] FLR;  Service: Orthopedics;  Laterality: Right;    KIDNEY TRANSPLANT  2005    PARATHYROIDECTOMY      PERCUTANEOUS " TRANSLUMINAL ANGIOPLASTY OF ARTERIOVENOUS FISTULA Left 2024    Procedure: PTA, AV FISTULA;  Surgeon: JUANI Melendez III, MD;  Location: [facility] CATH LAB;  Service: Peripheral Vascular;  Laterality: Left;    TREATMENT OF CARDIAC ARRHYTHMIA N/A 3/28/2022    Procedure: CARDIOVERSION;  Surgeon: Migue Blunt MD;  Location: [facility] EP LAB;  Service: Cardiology;  Laterality: N/A;  AF, DCC, MAC, GP, 3 PREP*RODRIGO deferred pt 100% compliant*     Family History       Problem Relation (Age of Onset)    Heart disease Father    Kidney disease Sister, Brother    Kidney failure Sister, Brother    No Known Problems Sister, Brother, Sister, Sister    Thyroid disease Mother          Tobacco Use    Smoking status: Every Day     Current packs/day: 0.00     Average packs/day: 0.5 packs/day for 40.0 years (20.0 ttl pk-yrs)     Types: Cigarettes     Start date: 1982     Last attempt to quit: 2022     Years since quittin.9    Smokeless tobacco: Never    Tobacco comments:     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     Patient is currently retired   Substance and Sexual Activity    Alcohol use: No    Drug use: No    Sexual activity: Yes     Partners: Female     Review of Systems   Constitutional:  Positive for activity change.   HENT:  Negative for nosebleeds.    Eyes:  Negative for visual disturbance.   Respiratory:  Negative for shortness of breath.    Cardiovascular:  Negative for chest pain.   Gastrointestinal:  Negative for nausea.   Musculoskeletal:  Negative for gait problem.   Skin:  Negative for color change.   Neurological:  Positive for syncope.   Hematological:  Does not bruise/bleed easily.   Psychiatric/Behavioral:  Negative for sleep disturbance.      Objective:     Vital Signs (Most Recent):  Temp: 98.7 °F (37.1 °C) (24 1128)  Pulse: 68 (24 1128)  Resp: 18 (24 1128)  BP: 134/63 (24 1128)  SpO2: 96 % (24) Vital Signs (24h Range):  Temp:   "[97.6 °F (36.4 °C)-98.7 °F (37.1 °C)] 98.7 °F (37.1 °C)  Pulse:  [] 68  Resp:  [16-20] 18  SpO2:  [92 %-99 %] 96 %  BP: (127-159)/(61-90) 134/63     Weight: 88 kg (194 lb)  Body mass index is 25.6 kg/m².    SpO2: 96 %        Intake/Output - Last 3 Shifts         02/25 0700 02/26 0659 02/26 0700 02/27 0659 02/27 0700 02/28 0659    P.O. 455      Blood  381.7     Other  500     Total Intake(mL/kg) 455 (5.2) 881.7 (10)     Urine (mL/kg/hr) 250 (0.1) 750 (0.4)     Emesis/NG output       Other  3000     Stool 0      Total Output 250 3750     Net +205 -2868.3            Urine Occurrence 0 x      Stool Occurrence 0 x               Lines/Drains/Airways       Peripheral Intravenous Line  Duration                  Hemodialysis AV Fistula 02/19/24 0924 Left upper arm 8 days         Peripheral IV - Single Lumen 02/23/24 0802 20 G Anterior;Right Forearm 4 days                        Physical Exam  Constitutional:       General: He is not in acute distress.  HENT:      Head: Normocephalic and atraumatic.   Eyes:      Pupils: Pupils are equal, round, and reactive to light.   Cardiovascular:      Rate and Rhythm: Normal rate.   Pulmonary:      Effort: No respiratory distress.   Musculoskeletal:         General: Normal range of motion.      Cervical back: Normal range of motion.   Skin:     Coloration: Skin is not pale.   Neurological:      Mental Status: He is alert. Mental status is at baseline.   Psychiatric:         Mood and Affect: Mood normal.            Significant Labs:  ABGs: No results for input(s): "PH", "PCO2", "PO2", "HCO3", "POCSATURATED", "BE" in the last 48 hours.  Amylase: No results for input(s): "AMYLASE" in the last 48 hours.  BMP:   Recent Labs   Lab 02/27/24  0148   *      K 4.1      CO2 24   BUN 32*   CREATININE 4.2*   CALCIUM 8.6*   MG 1.7     Cardiac markers:   Recent Labs   Lab 02/26/24  0741   TROPONINI 10.660*     CBC:   Recent Labs   Lab 02/27/24  0148   WBC 5.08   RBC 3.36* " "  HGB 8.8*   HCT 26.8*   *   MCV 80*   MCH 26.2*   MCHC 32.8     CMP:   Recent Labs   Lab 02/27/24  0148   *   CALCIUM 8.6*      K 4.1   CO2 24      BUN 32*   CREATININE 4.2*     Coagulation:   Recent Labs   Lab 02/25/24  1204 02/25/24  1811 02/27/24  0148   INR 1.2  --   --    APTT 26.8   < > 36.2*    < > = values in this interval not displayed.     Lactic Acid: No results for input(s): "LACTATE" in the last 48 hours.  LFTs: No results for input(s): "ALT", "AST", "ALKPHOS", "BILITOT", "PROT", "ALBUMIN" in the last 48 hours.  Lipase: No results for input(s): "LIPASE" in the last 48 hours.    Significant Diagnostics: reviewed   [facility] 2/26/24    The Prox LAD lesion was 60% stenosed.    The Mid LAD lesion was 70% stenosed.    The Prox RCA lesion was 95% stenosed.    The 1st Mrg lesion was 70% stenosed.    The 2nd Mrg lesion was 70% stenosed.    The estimated blood loss was none.    There was three vessel coronary artery disease.    TTE 2/26/24    Left Ventricle: There is normal systolic function with a visually estimated ejection fraction of 60 - 65%. Grade II diastolic dysfunction. Elevated left ventricular filling pressure.    Right Ventricle: Normal right ventricular cavity size. Wall thickness is normal. Right ventricle wall motion  is normal. Systolic function is normal.    Left Atrium: Left atrium is moderately dilated.    Right Atrium: Right atrium is moderately dilated.    Aortic Valve: The aortic valve is a trileaflet valve. There is moderate aortic valve sclerosis. There is moderate annular calcification present. There is mild to moderate aortic regurgitation. There appers to be mobile echodensoty attached to the base of the non coronary leaflet that is likely nodular calcificaiton and is tthe site of origin of Aortic regurgitation. This appears unchanged from pror TTE. AV leaflets appears thickened in some views cannot exlude presence of endocarditis (clip # 5).    Aortic Valve: " Mildly restricted motion. There is mild stenosis. Aortic valve area by VTI is 1.62 cm². Aortic valve peak velocity is 2.62 m/s. Mean gradient is 14 mmHg. The dimensionless index is 0.47.    Mitral Valve: There is moderate mitral annular calcification present. There is mild to moderate regurgitation.    Aorta: Aortic root is normal in size measuring 3.61 cm. Ascending aorta is normal measuring 2.80 cm.    Pulmonary Artery: The estimated pulmonary artery systolic pressure is 49 mmHg.    IVC/SVC: Intermediate venous pressure at 8 mmHg.

## 2024-02-27 NOTE — PROGRESS NOTES
[facility] - Cardiology [facility] Medicine  Progress Note    Patient Name: Ibrahima Phelps  MRN: [id number]  Patient Class: IP- Inpatient   Admission Date: 2/23/2024  Length of Stay: 4 days  Attending Physician: Yamil Silva DO  Primary Care Provider: Anatoliy Colindres MD        Subjective:     Principal Problem:NSTEMI (non-ST elevated myocardial infarction)        HPI:  Mr. Phelps is a a 67 yo male with a PMHx of paroxysmal atrial fibrillation, tachy-loan syndrome, HFpEF with most recent LVEF 65%, CAD s/p PCI 2006, HTN, HLD, aortic atherosclerosis, secondary hyperparathyroidism, diverticulosis, COPD, GERD, hypothyroidism, gout, a history of tobacco use, obesity, ESRD s/p kidney transplant x2 (1992, 2005) on chronic immunosuppressive medication who presents with chest pain.      Recent admission and discharge 2/21/24. Syncope after vascular procedure. No syncopal episodes or events on telemetry were noted after admission. IV Lasix was given noting BNP > 2000 and overload on exam.     In the ED his HR was in 60-70s, -180s. Plts 121. K 2.9, Cr 7.8. Trop .08. BNP 1929. LA 1.71. CXr with pulmonary edema and pleural effusion on the right. ECF with AF with RVR with ST depressions in inferior leads and non-specific changes. Pt complaining of crushing chest pain which started this AM around 3 and continued until he got here. PT given Nitro in ED x3 and morphine twice. Pt noted chest pain is improved on my evaluation and he is chest pain free.     Overview/Hospital Course:  History of ESRD status post renal transplant x2 initially presented for chest pain, NSTEMI and on ACS protocol.  Admitted to CCU, LHC was initially planned however pt was noted to not be fully aware of procedural risks and in addition due to renal dysfunction therefore LHC was aborted.  Eventually done 2/26.  Found to have multivessel disease.  CT surgery consulted. Had been hypervolemic on exam, likely requiring dialysis. CXR with pulmonary  edema and increased size of small right pleural effusion. Fistula had previously created on 12/2023 with plan for HD in the future.  Was recently discharged on 02/21, HD recommended by KTM team however pt had declined. KTM currently on board, pt now agreeable for dialysis    Interval History:  Seen this a.m. at bedside.  Denies any chest pain, shortness of breath.  No acute events overnight    Review of Systems  Objective:     Vital Signs (Most Recent):  Temp: 98 °F (36.7 °C) (02/26/24 1240)  Pulse: 99 (02/26/24 1240)  Resp: 18 (02/26/24 1240)  BP: 131/64 (02/26/24 1240)  SpO2: 98 % (02/26/24 1240) Vital Signs (24h Range):  Temp:  [97.6 °F (36.4 °C)-98.2 °F (36.8 °C)] 98 °F (36.7 °C)  Pulse:  [] 99  Resp:  [18-20] 18  SpO2:  [91 %-98 %] 98 %  BP: (120-139)/(57-75) 131/64     Weight: 88 kg (194 lb)  Body mass index is 25.6 kg/m².    Intake/Output Summary (Last 24 hours) at 2/26/2024 1248  Last data filed at 2/25/2024 1752  Gross per 24 hour   Intake 100 ml   Output --   Net 100 ml           Physical Exam  Constitutional:       General: He is not in acute distress.     Appearance: He is ill-appearing. He is not toxic-appearing.   HENT:      Head: Normocephalic and atraumatic.   Eyes:      Pupils: Pupils are equal, round, and reactive to light.   Cardiovascular:      Rate and Rhythm: Normal rate and regular rhythm.      Pulses: Normal pulses.   Pulmonary:      Effort: Pulmonary effort is normal. No respiratory distress.      Breath sounds: Rales present. No wheezing or rhonchi.   Abdominal:      General: Abdomen is flat. Bowel sounds are normal. There is no distension.      Palpations: Abdomen is soft.      Tenderness: There is no abdominal tenderness.   Musculoskeletal:         General: Normal range of motion.      Cervical back: Normal range of motion.      Right lower leg: Edema present.      Left lower leg: Edema present.   Skin:     General: Skin is warm and dry.   Neurological:      General: No focal  deficit present.      Mental Status: He is alert. Mental status is at baseline.   Psychiatric:         Mood and Affect: Mood normal.         Behavior: Behavior normal.         Thought Content: Thought content normal.             Significant Labs: All pertinent labs within the past 24 hours have been reviewed.    Significant Imaging: I have reviewed all pertinent imaging results/findings within the past 24 hours.    Assessment/Plan:      * NSTEMI (non-ST elevated myocardial infarction)  #CAD  -initially presenting with chest pain, although attributed to increased LVEDP/volume overload  -No active chest pain, hemodynamically stable.  EKG on admission without ischemic changes  -completed 48hr heparin ACS protocol, heparin not previously trended, repeat obtained this a.m. markedly elevated at 13  -s/p LHC apparently on 2/23 however findings unknown as final report not in EMR  -TTE 2/26: preserved EF, no noted RWA  -LHC done 2/26:  MV disease involving LAD, Mrg, notably 95% pRCA occlusion  Plan:  -CT surgery consulted for CABG eval  -Continue heparin ggt, ASA, high intensity statin  -Cardiology on board  -continue to monitor on telemetry      ESRD (end stage renal disease)  Creatine stable for now. BMP reviewed- noted Estimated Creatinine Clearance: 11.3 mL/min (A) (based on SCr of 7 mg/dL (H)). according to latest data. Based on current GFR, CKD stage is stage 5 - GFR < 15.  Monitor UOP and serial BMP and adjust therapy as needed. Renally dose meds. Avoid nephrotoxic medications and procedures.  -Fistula recently created 12/18/23 (unresponsive during recent outpatient fistulogram leading to previous hospitalization)  -Volume overloaded on exam, renal failure worsening. Previously ESRD s/p transplant x2, now progressed back to ESRD  -KTM discussing HD with patient, pt now agreeable. S/p first session on 2/24  -SW for outpt HD chair    Anemia of chronic kidney failure, stage 5  Patient's anemia is currently controlled.  Has received 1 units of PRBCs on 2/26 . Etiology likely d/t chronic disease due to Chronic Kidney Disease/ESRD  Current CBC reviewed-   Lab Results   Component Value Date    HGB 6.4 (L) 02/26/2024    HCT 20.0 (L) 02/26/2024     Monitor serial CBC and transfuse if patient becomes hemodynamically unstable, symptomatic or H/H drops below 7/21.    Phosphorus metabolism disorder  Sevelamer      Failed kidney transplant        Acute on chronic diastolic heart failure  -Status post IV diuresis, now euvolemic  -TTE 2/26 with preserved EF      Chronic heart failure with preserved ejection fraction  -Not in exacerbation, volume overload likely due to ESRD  -TTE ordered  -HD as per schedule      Paroxysmal atrial fibrillation  Patient with Paroxysmal (<7 days) atrial fibrillation which is controlled currently with Beta Blocker and Amiodarone. Patient is currently in sinus rhythm.[id number] Score: 2. HASBLED Score: . Anticoagulation indicated. Anticoagulation done with heparin .    H/O kidney transplant  Failed renal transplant, now ESRD  KTM following      Mixed hyperlipidemia  ASA, statin      Primary hypertension  Chronic, controlled. Latest blood pressure and vitals reviewed-     Temp:  [97 °F (36.1 °C)-98.4 °F (36.9 °C)]   Pulse:  [53-73]   Resp:  [16-18]   BP: (134-156)/(60-76)   SpO2:  [88 %-97 %] .   Home meds for hypertension were reviewed and noted below.   Hypertension Medications               doxazosin (CARDURA) 4 MG tablet Take 1 tablet (4 mg total) by mouth every 12 (twelve) hours.    furosemide (LASIX) 80 MG tablet Take 1 tablet (80 mg total) by mouth 2 (two) times a day.    hydrALAZINE (APRESOLINE) 50 MG tablet TAKE 1 TABLET (50 MG TOTAL) BY MOUTH 3 (THREE) TIMES DAILY.    isosorbide mononitrate (IMDUR) 30 MG 24 hr tablet Take 4 tablets (120 mg total) by mouth once daily.    metoprolol succinate (TOPROL-XL) 25 MG 24 hr tablet Take 0.5 tablets (12.5 mg total) by mouth once daily.    NIFEdipine (PROCARDIA-XL) 60  MG (OSM) 24 hr tablet Take 1 tablet (60 mg total) by mouth 2 (two) times a day.    nitroGLYCERIN (NITROSTAT) 0.4 MG SL tablet Place 1 tablet (0.4 mg total) under the tongue every 5 (five) minutes as needed for Chest pain.            While in the hospital, will manage blood pressure as follows; Continue home antihypertensive regimen    Will utilize p.r.n. blood pressure medication only if patient's blood pressure greater than 180/110 and he develops symptoms such as worsening chest pain or shortness of breath.      VTE Risk Mitigation (From admission, onward)           Ordered     heparin (porcine) injection  As needed (PRN)         02/26/24 1638     heparin infusion 1,000 units/500 ml in 0.9% NaCl (on sterile field)  As needed (PRN)         02/26/24 1620     heparin 25,000 units in dextrose 5% (100 units/ml) IV bolus from bag LOW INTENSITY nomogram - [facility]  As needed (PRN)        Question:  Heparin Infusion Adjustment (DO NOT MODIFY ANSWER)  Answer:  \\[email or web address]\Images\Pharmacy\HeparinInfusions\heparin LOW INTENSITY nomogram for [facility] [id number]    02/25/24 1129     heparin 25,000 units in dextrose 5% (100 units/ml) IV bolus from bag LOW INTENSITY nomogram - [facility]  As needed (PRN)        Question:  Heparin Infusion Adjustment (DO NOT MODIFY ANSWER)  Answer:  \\[email or web address]\[email or web address]\Images\Pharmacy\HeparinInfusions\heparin LOW INTENSITY nomogram for [facility] [id number]    02/25/24 1129     heparin 25,000 units in dextrose 5% 250 mL (100 units/mL) infusion LOW INTENSITY nomogram - [facility]  Continuous        Question:  Begin at (units/kg/hr)  Answer:  12    02/25/24 1129     IP VTE HIGH RISK PATIENT  Once         02/23/24 1122     Place sequential compression device  Until discontinued         02/23/24 1122                    Discharge Planning   RAMU: 2/29/2024     Code Status: Full Code   Is the patient medically ready for discharge?: No    Reason for patient still in hospital (select all that apply): Patient trending  condition  Discharge Plan A: Home with family                  Yamil Silva DO  Department of Hospital Medicine   [facility] - Cardiology Stepdown

## 2024-02-28 LAB
ANION GAP SERPL CALC-SCNC: 12 MMOL/L (ref 8–16)
APTT PPP: 29.6 SEC (ref 21–32)
APTT PPP: 54.2 SEC (ref 21–32)
BACTERIA BLD CULT: NORMAL
BACTERIA BLD CULT: NORMAL
BASOPHILS # BLD AUTO: 0.01 K/UL (ref 0–0.2)
BASOPHILS NFR BLD: 0.1 % (ref 0–1.9)
BUN SERPL-MCNC: 54 MG/DL (ref 8–23)
CALCIUM SERPL-MCNC: 7.8 MG/DL (ref 8.7–10.5)
CHLORIDE SERPL-SCNC: 104 MMOL/L (ref 95–110)
CO2 SERPL-SCNC: 24 MMOL/L (ref 23–29)
CREAT SERPL-MCNC: 6.3 MG/DL (ref 0.5–1.4)
DIFFERENTIAL METHOD BLD: ABNORMAL
EOSINOPHIL # BLD AUTO: 0.1 K/UL (ref 0–0.5)
EOSINOPHIL NFR BLD: 1.2 % (ref 0–8)
ERYTHROCYTE [DISTWIDTH] IN BLOOD BY AUTOMATED COUNT: 17.5 % (ref 11.5–14.5)
EST. GFR  (NO RACE VARIABLE): 8.9 ML/MIN/1.73 M^2
GAMMA INTERFERON BACKGROUND BLD IA-ACNC: 0.02 IU/ML
GLUCOSE SERPL-MCNC: 97 MG/DL (ref 70–110)
HCT VFR BLD AUTO: 24.5 % (ref 40–54)
HGB BLD-MCNC: 7.8 G/DL (ref 14–18)
IMM GRANULOCYTES # BLD AUTO: 0.07 K/UL (ref 0–0.04)
IMM GRANULOCYTES NFR BLD AUTO: 0.7 % (ref 0–0.5)
LYMPHOCYTES # BLD AUTO: 1.8 K/UL (ref 1–4.8)
LYMPHOCYTES NFR BLD: 18.2 % (ref 18–48)
M TB IFN-G CD4+ BCKGRND COR BLD-ACNC: 0.02 IU/ML
M TB IFN-G CD4+ BCKGRND COR BLD-ACNC: 0.03 IU/ML
MAGNESIUM SERPL-MCNC: 1.8 MG/DL (ref 1.6–2.6)
MCH RBC QN AUTO: 26.1 PG (ref 27–31)
MCHC RBC AUTO-ENTMCNC: 31.8 G/DL (ref 32–36)
MCV RBC AUTO: 82 FL (ref 82–98)
MITOGEN IGNF BCKGRD COR BLD-ACNC: 1.5 IU/ML
MONOCYTES # BLD AUTO: 0.9 K/UL (ref 0.3–1)
MONOCYTES NFR BLD: 8.9 % (ref 4–15)
NEUTROPHILS # BLD AUTO: 6.9 K/UL (ref 1.8–7.7)
NEUTROPHILS NFR BLD: 70.9 % (ref 38–73)
NRBC BLD-RTO: 0 /100 WBC
PHOSPHATE SERPL-MCNC: 3 MG/DL (ref 2.7–4.5)
PLATELET # BLD AUTO: 141 K/UL (ref 150–450)
PMV BLD AUTO: ABNORMAL FL (ref 9.2–12.9)
POCT GLUCOSE: 132 MG/DL (ref 70–110)
POCT GLUCOSE: 89 MG/DL (ref 70–110)
POCT GLUCOSE: 98 MG/DL (ref 70–110)
POTASSIUM SERPL-SCNC: 3.7 MMOL/L (ref 3.5–5.1)
RBC # BLD AUTO: 2.99 M/UL (ref 4.6–6.2)
SODIUM SERPL-SCNC: 140 MMOL/L (ref 136–145)
TB GOLD PLUS: NEGATIVE
WBC # BLD AUTO: 9.77 K/UL (ref 3.9–12.7)

## 2024-02-28 PROCEDURE — 99233 SBSQ HOSP IP/OBS HIGH 50: CPT | Mod: GC,,, | Performed by: INTERNAL MEDICINE

## 2024-02-28 PROCEDURE — 25000003 PHARM REV CODE 250: Performed by: INTERNAL MEDICINE

## 2024-02-28 PROCEDURE — 85730 THROMBOPLASTIN TIME PARTIAL: CPT | Performed by: STUDENT IN AN ORGANIZED HEALTH CARE EDUCATION/TRAINING PROGRAM

## 2024-02-28 PROCEDURE — B2111ZZ FLUOROSCOPY OF MULTIPLE CORONARY ARTERIES USING LOW OSMOLAR CONTRAST: ICD-10-PCS | Performed by: INTERNAL MEDICINE

## 2024-02-28 PROCEDURE — 92928 PRQ TCAT PLMT NTRAC ST 1 LES: CPT | Mod: RC,,, | Performed by: INTERNAL MEDICINE

## 2024-02-28 PROCEDURE — 27201423 OPTIME MED/SURG SUP & DEVICES STERILE SUPPLY: Performed by: INTERNAL MEDICINE

## 2024-02-28 PROCEDURE — 99152 MOD SED SAME PHYS/QHP 5/>YRS: CPT | Mod: ,,, | Performed by: INTERNAL MEDICINE

## 2024-02-28 PROCEDURE — C1894 INTRO/SHEATH, NON-LASER: HCPCS | Performed by: INTERNAL MEDICINE

## 2024-02-28 PROCEDURE — 25500020 PHARM REV CODE 255: Performed by: INTERNAL MEDICINE

## 2024-02-28 PROCEDURE — 63600175 PHARM REV CODE 636 W HCPCS: Performed by: STUDENT IN AN ORGANIZED HEALTH CARE EDUCATION/TRAINING PROGRAM

## 2024-02-28 PROCEDURE — 63600175 PHARM REV CODE 636 W HCPCS

## 2024-02-28 PROCEDURE — C1725 CATH, TRANSLUMIN NON-LASER: HCPCS | Performed by: INTERNAL MEDICINE

## 2024-02-28 PROCEDURE — 63600175 PHARM REV CODE 636 W HCPCS: Performed by: INTERNAL MEDICINE

## 2024-02-28 PROCEDURE — 94640 AIRWAY INHALATION TREATMENT: CPT

## 2024-02-28 PROCEDURE — 94761 N-INVAS EAR/PLS OXIMETRY MLT: CPT

## 2024-02-28 PROCEDURE — 85025 COMPLETE CBC W/AUTO DIFF WBC: CPT | Performed by: STUDENT IN AN ORGANIZED HEALTH CARE EDUCATION/TRAINING PROGRAM

## 2024-02-28 PROCEDURE — 85730 THROMBOPLASTIN TIME PARTIAL: CPT | Mod: 91 | Performed by: STUDENT IN AN ORGANIZED HEALTH CARE EDUCATION/TRAINING PROGRAM

## 2024-02-28 PROCEDURE — 84100 ASSAY OF PHOSPHORUS: CPT | Performed by: STUDENT IN AN ORGANIZED HEALTH CARE EDUCATION/TRAINING PROGRAM

## 2024-02-28 PROCEDURE — C1769 GUIDE WIRE: HCPCS | Performed by: INTERNAL MEDICINE

## 2024-02-28 PROCEDURE — 36415 COLL VENOUS BLD VENIPUNCTURE: CPT | Mod: XB | Performed by: STUDENT IN AN ORGANIZED HEALTH CARE EDUCATION/TRAINING PROGRAM

## 2024-02-28 PROCEDURE — 99152 MOD SED SAME PHYS/QHP 5/>YRS: CPT | Performed by: INTERNAL MEDICINE

## 2024-02-28 PROCEDURE — 027035Z DILATION OF CORONARY ARTERY, ONE ARTERY WITH TWO DRUG-ELUTING INTRALUMINAL DEVICES, PERCUTANEOUS APPROACH: ICD-10-PCS | Performed by: INTERNAL MEDICINE

## 2024-02-28 PROCEDURE — 20600001 HC STEP DOWN PRIVATE ROOM

## 2024-02-28 PROCEDURE — 99233 SBSQ HOSP IP/OBS HIGH 50: CPT | Mod: ,,, | Performed by: INTERNAL MEDICINE

## 2024-02-28 PROCEDURE — 25000003 PHARM REV CODE 250: Performed by: STUDENT IN AN ORGANIZED HEALTH CARE EDUCATION/TRAINING PROGRAM

## 2024-02-28 PROCEDURE — C1760 CLOSURE DEV, VASC: HCPCS | Performed by: INTERNAL MEDICINE

## 2024-02-28 PROCEDURE — 80048 BASIC METABOLIC PNL TOTAL CA: CPT | Performed by: STUDENT IN AN ORGANIZED HEALTH CARE EDUCATION/TRAINING PROGRAM

## 2024-02-28 PROCEDURE — 83735 ASSAY OF MAGNESIUM: CPT | Performed by: STUDENT IN AN ORGANIZED HEALTH CARE EDUCATION/TRAINING PROGRAM

## 2024-02-28 PROCEDURE — C1874 STENT, COATED/COV W/DEL SYS: HCPCS | Performed by: INTERNAL MEDICINE

## 2024-02-28 PROCEDURE — C1887 CATHETER, GUIDING: HCPCS | Performed by: INTERNAL MEDICINE

## 2024-02-28 PROCEDURE — 25000003 PHARM REV CODE 250

## 2024-02-28 PROCEDURE — C9600 PERC DRUG-EL COR STENT SING: HCPCS | Mod: RC | Performed by: INTERNAL MEDICINE

## 2024-02-28 DEVICE — EVEROLIMUS-ELUTING PLATINUM CHROMIUM CORONARY STENT SYSTEM
Type: IMPLANTABLE DEVICE | Site: CORONARY | Status: FUNCTIONAL
Brand: SYNERGY™ XD

## 2024-02-28 DEVICE — EVEROLIMUS-ELUTING PLATINUM CHROMIUM CORONARY STENT SYSTEM
Type: IMPLANTABLE DEVICE | Site: HEART | Status: FUNCTIONAL
Brand: SYNERGY™ XD

## 2024-02-28 RX ORDER — MIDAZOLAM HYDROCHLORIDE 1 MG/ML
INJECTION, SOLUTION INTRAMUSCULAR; INTRAVENOUS
Status: DISCONTINUED | OUTPATIENT
Start: 2024-02-28 | End: 2024-02-29

## 2024-02-28 RX ORDER — DIPHENHYDRAMINE HYDROCHLORIDE 50 MG/ML
INJECTION INTRAMUSCULAR; INTRAVENOUS
Status: DISCONTINUED | OUTPATIENT
Start: 2024-02-28 | End: 2024-02-29

## 2024-02-28 RX ORDER — SODIUM CHLORIDE 9 MG/ML
INJECTION, SOLUTION INTRAVENOUS ONCE
Status: CANCELLED | OUTPATIENT
Start: 2024-02-28 | End: 2024-02-28

## 2024-02-28 RX ORDER — ACETAMINOPHEN 325 MG/1
650 TABLET ORAL EVERY 4 HOURS PRN
Status: DISCONTINUED | OUTPATIENT
Start: 2024-02-28 | End: 2024-02-29

## 2024-02-28 RX ORDER — ONDANSETRON 8 MG/1
8 TABLET, ORALLY DISINTEGRATING ORAL EVERY 8 HOURS PRN
Status: DISCONTINUED | OUTPATIENT
Start: 2024-02-28 | End: 2024-03-02 | Stop reason: HOSPADM

## 2024-02-28 RX ORDER — CLOPIDOGREL BISULFATE 300 MG/1
600 TABLET, FILM COATED ORAL ONCE
Status: COMPLETED | OUTPATIENT
Start: 2024-02-28 | End: 2024-02-28

## 2024-02-28 RX ORDER — CLOPIDOGREL BISULFATE 75 MG/1
75 TABLET ORAL DAILY
Status: DISCONTINUED | OUTPATIENT
Start: 2024-02-29 | End: 2024-03-02 | Stop reason: HOSPADM

## 2024-02-28 RX ORDER — AMINOPHYLLINE 25 MG/ML
250 INJECTION, SOLUTION INTRAVENOUS ONCE
Status: COMPLETED | OUTPATIENT
Start: 2024-02-28 | End: 2024-02-28

## 2024-02-28 RX ORDER — CEFAZOLIN SODIUM 1 G/3ML
INJECTION, POWDER, FOR SOLUTION INTRAMUSCULAR; INTRAVENOUS
Status: DISCONTINUED | OUTPATIENT
Start: 2024-02-28 | End: 2024-02-29

## 2024-02-28 RX ADMIN — DOXAZOSIN 4 MG: 2 TABLET ORAL at 08:02

## 2024-02-28 RX ADMIN — HYDRALAZINE HYDROCHLORIDE 50 MG: 50 TABLET ORAL at 01:02

## 2024-02-28 RX ADMIN — AMIODARONE HYDROCHLORIDE 200 MG: 200 TABLET ORAL at 08:02

## 2024-02-28 RX ADMIN — METOPROLOL SUCCINATE 12.5 MG: 25 TABLET, EXTENDED RELEASE ORAL at 08:02

## 2024-02-28 RX ADMIN — FAMOTIDINE 20 MG: 20 TABLET, FILM COATED ORAL at 12:02

## 2024-02-28 RX ADMIN — ATORVASTATIN CALCIUM 80 MG: 20 TABLET, FILM COATED ORAL at 08:02

## 2024-02-28 RX ADMIN — DIPHENHYDRAMINE HYDROCHLORIDE 50 MG: 50 CAPSULE ORAL at 08:02

## 2024-02-28 RX ADMIN — ASPIRIN 81 MG: 81 TABLET, COATED ORAL at 08:02

## 2024-02-28 RX ADMIN — HYDRALAZINE HYDROCHLORIDE 50 MG: 50 TABLET ORAL at 05:02

## 2024-02-28 RX ADMIN — GUAIFENESIN 600 MG: 600 TABLET, EXTENDED RELEASE ORAL at 08:02

## 2024-02-28 RX ADMIN — TACROLIMUS 3 MG: 1 CAPSULE ORAL at 05:02

## 2024-02-28 RX ADMIN — MUPIROCIN: 20 OINTMENT TOPICAL at 08:02

## 2024-02-28 RX ADMIN — FAMOTIDINE 20 MG: 20 TABLET, FILM COATED ORAL at 05:02

## 2024-02-28 RX ADMIN — TACROLIMUS 3 MG: 1 CAPSULE ORAL at 07:02

## 2024-02-28 RX ADMIN — TIOTROPIUM BROMIDE INHALATION SPRAY 2 PUFF: 1.56 SPRAY, METERED RESPIRATORY (INHALATION) at 09:02

## 2024-02-28 RX ADMIN — PREDNISONE 50 MG: 50 TABLET ORAL at 08:02

## 2024-02-28 RX ADMIN — THERA TABS 1 TABLET: TAB at 08:02

## 2024-02-28 RX ADMIN — SEVELAMER CARBONATE 800 MG: 800 TABLET, FILM COATED ORAL at 05:02

## 2024-02-28 RX ADMIN — AMINOPHYLLINE 250 MG: 25 INJECTION, SOLUTION INTRAVENOUS at 02:02

## 2024-02-28 RX ADMIN — CLOPIDOGREL BISULFATE 600 MG: 300 TABLET, FILM COATED ORAL at 02:02

## 2024-02-28 RX ADMIN — SEVELAMER CARBONATE 800 MG: 800 TABLET, FILM COATED ORAL at 08:02

## 2024-02-28 RX ADMIN — SEVELAMER CARBONATE 800 MG: 800 TABLET, FILM COATED ORAL at 12:02

## 2024-02-28 RX ADMIN — FAMOTIDINE 20 MG: 20 TABLET ORAL at 08:02

## 2024-02-28 RX ADMIN — PREDNISONE 5 MG: 2.5 TABLET ORAL at 08:02

## 2024-02-28 RX ADMIN — NIFEDIPINE 60 MG: 60 TABLET, FILM COATED, EXTENDED RELEASE ORAL at 08:02

## 2024-02-28 RX ADMIN — HEPARIN SODIUM AND DEXTROSE 15 UNITS/KG/HR: 10000; 5 INJECTION INTRAVENOUS at 07:02

## 2024-02-28 RX ADMIN — APIXABAN 5 MG: 5 TABLET, FILM COATED ORAL at 08:02

## 2024-02-28 RX ADMIN — HYDRALAZINE HYDROCHLORIDE 50 MG: 50 TABLET ORAL at 10:02

## 2024-02-28 NOTE — SUBJECTIVE & OBJECTIVE
Interval History: NAEO. No acute cardiac complaints.    Objective:     Vital Signs (Most Recent):  Temp: 98 °F (36.7 °C) (02/28/24 1132)  Pulse: 61 (02/28/24 1132)  Resp: 18 (02/28/24 1132)  BP: (!) 119/58 (02/28/24 1132)  SpO2: 96 % (02/28/24 1132) Vital Signs (24h Range):  Temp:  [97.9 °F (36.6 °C)-98.4 °F (36.9 °C)] 98 °F (36.7 °C)  Pulse:  [52-66] 61  Resp:  [16-18] 18  SpO2:  [94 %-96 %] 96 %  BP: (115-145)/(56-67) 119/58     Weight: 85.5 kg (188 lb 7.9 oz)  Body mass index is 24.87 kg/m².    SpO2: 96 %         Intake/Output Summary (Last 24 hours) at 2/28/2024 1329  Last data filed at 2/28/2024 0523  Gross per 24 hour   Intake 270 ml   Output 0 ml   Net 270 ml       Lines/Drains/Airways       Peripheral Intravenous Line  Duration                  Hemodialysis AV Fistula 02/19/24 0924 Left upper arm 9 days         Peripheral IV - Single Lumen 02/23/24 0802 20 G Anterior;Right Forearm 5 days                     Physical Exam  Constitutional:       General: He is in acute distress.      Appearance: He is ill-appearing. He is not diaphoretic.   HENT:      Head: Normocephalic and atraumatic.      Nose: No congestion.      Mouth/Throat:      Mouth: Mucous membranes are dry.   Eyes:      General: No scleral icterus.     Extraocular Movements: Extraocular movements intact.      Pupils: Pupils are equal, round, and reactive to light.   Cardiovascular:      Rate and Rhythm: Normal rate and regular rhythm.      Heart sounds: No murmur heard.     No gallop.      Comments: Pulses:  Carotid pulses are 2+ on the right side, and 2+ on the left side.  Radial pulses are 1+ on the right side, and 1+ on the left side.   Femoral pulses are 2+ on the right side, and 2+ on the left side.   Dorsalis pedis pulses are 2+ on the right side, and 2+ on the left side.      Pulmonary:      Effort: No respiratory distress.      Breath sounds: No rhonchi or rales.   Chest:      Chest wall: No tenderness.   Abdominal:      General: Bowel  sounds are normal. There is no distension.      Tenderness: There is no abdominal tenderness.   Musculoskeletal:         General: No swelling or tenderness.      Cervical back: Normal range of motion. No rigidity.      Comments: Left UE AC Fistula +   Skin:     Capillary Refill: Capillary refill takes less than 2 seconds.      Coloration: Skin is not jaundiced.      Findings: No rash.   Neurological:      General: No focal deficit present.      Mental Status: He is alert and oriented to person, place, and time.   Psychiatric:         Mood and Affect: Mood normal.            Significant Labs: All pertinent lab results from the last 24 hours have been reviewed.

## 2024-02-28 NOTE — ASSESSMENT & PLAN NOTE
Multivessel CAD  Proceed with PCI  69 yo male with a PMHx of paroxysmal atrial fibrillation on amiodarone 200mg qd and apixaban 5mg bid, tachy-loan syndrome, HFpEF with most recent LVEF 65%, CAD s/p PCI 2006 on plavix, HTN, HLD, aortic atherosclerosis, secondary hyperparathyroidism, diverticulosis, COPD, GERD, hypothyroidism, gout, a history of tobacco use, obesity, ESRD s/p kidney transplant x2 (1992, 2005) on chronic immunosuppressive medication who was admitted to [facility] on 02/23/2024 with crushing chest pain. Interventional Cardiology was consulted for ischemic evaluation.      - Troponin 10.66 (2/26) vs 13.2 (2/25) vs 4.2 (2/23)  - Awaiting ECHO   - Hb 6.4, Creat 7.0  - We will do LHC  after the pRBC transfusion today  - Keep NPO  - Continue treatment with ASA, atorvastatin 80mg qd, metoprolol succinate 12.5mg qd  - Continue heparin gtt  - LHC +/- PCI, patient is a DIEGO candidate  - Anti-platelet Therapy: ASA  and plavix  - Access: Right radial/CFA  - Allergies: Itching with Povidone Iodine  - Pre-Hydration: NS  - Pre-Op Med: Bendaryl 50mg pO and solumedrol 125mg  - All patient's questions were answered.  -The risks, benefits and alternatives of the procedure were explained to the patient.   -The risks of coronary angiography include but are not limited to: bleeding, infection, heart rhythm abnormalities, allergic reactions, kidney injury and potential need for dialysis, stroke and death.   - Should stenting be indicated, the patient has agreed to dual anti-platelet therapy for 1-consecutive year with a drug-eluting stent and a minimum of 1-month with the use of a bare metal stent  - Additionally, pt is aware that non-compliance is likely to result in stent clotting with heart attack, heart failure, and/or death  -The risks of moderate sedation include hypotension, respiratory depression, arrhythmias, bronchospasm, and death.   - Informed consent was obtained and the  patient is agreeable to proceed with the  procedure.

## 2024-02-28 NOTE — NURSING
Pt came to the room after PCI. Aaox4. Tele continues. No c/o of pain. R groin site looks CDI with no hematoma/ecchymosis. Gauze and tegaderm intact.    Nurses Note -- 4 Eyes      2/28/2024   4:34 PM      Skin assessed during: Transfer      [] No Altered Skin Integrity Present    []Prevention Measures Documented      [x] Yes- Altered Skin Integrity Present or Discovered   [x] LDA Added if Not in Epic (R groin access site )   [] New Altered Skin Integrity was Present on Admit and Documented in LDA   [] Wound Image Taken    Wound Care Consulted? No    Attending Nurse:  RUSH Tapia     Second RN/Staff Member:  LILA Lucas.

## 2024-02-28 NOTE — PROGRESS NOTES
[facility] - Cardiology [facility]  Progress Note    Patient Name: Ibrahima Phelps  MRN: [id number]  Patient Class: IP- Inpatient   Admission Date: 2/23/2024  Length of Stay: 5 days  Attending Physician: Yamil Silva DO  Primary Care Provider: Anatoliy Colindres MD        Subjective:     Principal Problem:NSTEMI (non-ST elevated myocardial infarction)        HPI:  Mr. Phelps is a a 69 yo male with a PMHx of paroxysmal atrial fibrillation, tachy-loan syndrome, HFpEF with most recent LVEF 65%, CAD s/p PCI 2006, HTN, HLD, aortic atherosclerosis, secondary hyperparathyroidism, diverticulosis, COPD, GERD, hypothyroidism, gout, a history of tobacco use, obesity, ESRD s/p kidney transplant x2 (1992, 2005) on chronic immunosuppressive medication who presents with chest pain.      Recent admission and discharge 2/21/24. Syncope after vascular procedure. No syncopal episodes or events on telemetry were noted after admission. IV Lasix was given noting BNP > 2000 and overload on exam.     In the ED his HR was in 60-70s, -180s. Plts 121. K 2.9, Cr 7.8. Trop .08. BNP 1929. LA 1.71. CXr with pulmonary edema and pleural effusion on the right. ECF with AF with RVR with ST depressions in inferior leads and non-specific changes. Pt complaining of crushing chest pain which started this AM around 3 and continued until he got here. PT given Nitro in ED x3 and morphine twice. Pt noted chest pain is improved on my evaluation and he is chest pain free.     Overview/Hospital Course:  History of ESRD status post renal transplant x2 initially presented for chest pain, NSTEMI and on ACS protocol.  Admitted to CCU, LHC was initially planned however pt was noted to not be fully aware of procedural risks and in addition due to renal dysfunction therefore LHC was aborted.  Eventually done 2/26.  Found to have multivessel disease.  CT surgery consulted, deemed to not be surgical candidate. Now going for planned multivessel PCI  on 2/28. Had been hypervolemic on exam, likely requiring dialysis. CXR with pulmonary edema and increased size of small right pleural effusion. Fistula had previously created on 12/2023 with plan for HD in the future.  Was recently discharged on 02/21, HD recommended by KTM team however pt had declined. KTM currently on board, pt now agreeable for dialysis. First session was on 2/24/24.    Interval History:  Seen this a.m. at bedside.  Very upset this morning as he states that nobody had informed him that he is going for PCI which was planned overnight. Now agreeable for procedure after conversation. Denies any chest pain, shortness of breath.  No acute events overnight    Review of Systems  Objective:     Vital Signs (Most Recent):  Temp: 98 °F (36.7 °C) (02/26/24 1240)  Pulse: 99 (02/26/24 1240)  Resp: 18 (02/26/24 1240)  BP: 131/64 (02/26/24 1240)  SpO2: 98 % (02/26/24 1240) Vital Signs (24h Range):  Temp:  [97.6 °F (36.4 °C)-98.2 °F (36.8 °C)] 98 °F (36.7 °C)  Pulse:  [] 99  Resp:  [18-20] 18  SpO2:  [91 %-98 %] 98 %  BP: (120-139)/(57-75) 131/64     Weight: 88 kg (194 lb)  Body mass index is 25.6 kg/m².    Intake/Output Summary (Last 24 hours) at 2/26/2024 1248  Last data filed at 2/25/2024 1752  Gross per 24 hour   Intake 100 ml   Output --   Net 100 ml           Physical Exam  Constitutional:       General: He is not in acute distress.     Appearance: He is ill-appearing. He is not toxic-appearing.   HENT:      Head: Normocephalic and atraumatic.   Eyes:      Pupils: Pupils are equal, round, and reactive to light.   Cardiovascular:      Rate and Rhythm: Normal rate and regular rhythm.      Pulses: Normal pulses.   Pulmonary:      Effort: Pulmonary effort is normal. No respiratory distress.      Breath sounds: Rales present. No wheezing or rhonchi.   Abdominal:      General: Abdomen is flat. Bowel sounds are normal. There is no distension.      Palpations: Abdomen is soft.      Tenderness: There is no  abdominal tenderness.   Musculoskeletal:         General: Normal range of motion.      Cervical back: Normal range of motion.      Right lower leg: Edema present.      Left lower leg: Edema present.   Skin:     General: Skin is warm and dry.   Neurological:      General: No focal deficit present.      Mental Status: He is alert. Mental status is at baseline.   Psychiatric:         Mood and Affect: Mood normal.         Behavior: Behavior normal.         Thought Content: Thought content normal.             Significant Labs: All pertinent labs within the past 24 hours have been reviewed.    Significant Imaging: I have reviewed all pertinent imaging results/findings within the past 24 hours.    Assessment/Plan:      * NSTEMI (non-ST elevated myocardial infarction)  #CAD  -initially presenting with chest pain, although attributed to increased LVEDP/volume overload  -No active chest pain, hemodynamically stable.  EKG on admission without ischemic changes  -completed 48hr heparin ACS protocol, heparin not previously trended, repeat obtained this a.m. markedly elevated at 13  -s/p LHC apparently on 2/23 however findings unknown as final report not in EMR  -TTE 2/26: preserved EF, no noted RWA  -LHC done 2/26:  MV disease involving LAD, Mrg, notably 95% pRCA occlusion  Plan:  -CT surgery consulted for CABG eval. Deemed not to be surgical candidate due to poor targets and medical comorbidities  -Planned for multivessel PCI on 2/28   -Try to coordinate HD session for after LHC  -Continue heparin ggt, ASA, high intensity statin  -continue to monitor on telemetry      ESRD (end stage renal disease)  Creatine stable for now. BMP reviewed- noted Estimated Creatinine Clearance: 11.3 mL/min (A) (based on SCr of 7 mg/dL (H)). according to latest data. Based on current GFR, CKD stage is stage 5 - GFR < 15.  Monitor UOP and serial BMP and adjust therapy as needed. Renally dose meds. Avoid nephrotoxic medications and  procedures.  -Fistula recently created 12/18/23 (unresponsive during recent outpatient fistulogram leading to previous hospitalization)  -Volume overloaded on exam, renal failure worsening. Previously ESRD s/p transplant x2, now progressed back to ESRD  -KTM discussing HD with patient, pt now agreeable. S/p first session on 2/24  -SW for outpt HD chair    Anemia of chronic kidney failure, stage 5  Patient's anemia is currently controlled. Has received 1 units of PRBCs on 2/26 . Etiology likely d/t chronic disease due to Chronic Kidney Disease/ESRD  Current CBC reviewed-   Lab Results   Component Value Date    HGB 6.4 (L) 02/26/2024    HCT 20.0 (L) 02/26/2024     Monitor serial CBC and transfuse if patient becomes hemodynamically unstable, symptomatic or H/H drops below 7/21.    Phosphorus metabolism disorder  Sevelamer      Failed kidney transplant        Acute on chronic diastolic heart failure  -Status post IV diuresis, now euvolemic  -TTE 2/26 with preserved EF      Chronic heart failure with preserved ejection fraction  -Not in exacerbation, volume overload likely due to ESRD  -TTE ordered  -HD as per schedule      Paroxysmal atrial fibrillation  Patient with Paroxysmal (<7 days) atrial fibrillation which is controlled currently with Beta Blocker and Amiodarone. Patient is currently in sinus rhythm.[id number] Score: 2. HASBLED Score: . Anticoagulation indicated. Anticoagulation done with heparin .    H/O kidney transplant  Failed renal transplant, now ESRD  KTM following      Mixed hyperlipidemia  ASA, statin      Primary hypertension  Chronic, controlled. Latest blood pressure and vitals reviewed-     Temp:  [97 °F (36.1 °C)-98.4 °F (36.9 °C)]   Pulse:  [53-73]   Resp:  [16-18]   BP: (134-156)/(60-76)   SpO2:  [88 %-97 %] .   Home meds for hypertension were reviewed and noted below.   Hypertension Medications               doxazosin (CARDURA) 4 MG tablet Take 1 tablet (4 mg total) by mouth every 12 (twelve)  hours.    furosemide (LASIX) 80 MG tablet Take 1 tablet (80 mg total) by mouth 2 (two) times a day.    hydrALAZINE (APRESOLINE) 50 MG tablet TAKE 1 TABLET (50 MG TOTAL) BY MOUTH 3 (THREE) TIMES DAILY.    isosorbide mononitrate (IMDUR) 30 MG 24 hr tablet Take 4 tablets (120 mg total) by mouth once daily.    metoprolol succinate (TOPROL-XL) 25 MG 24 hr tablet Take 0.5 tablets (12.5 mg total) by mouth once daily.    NIFEdipine (PROCARDIA-XL) 60 MG (OSM) 24 hr tablet Take 1 tablet (60 mg total) by mouth 2 (two) times a day.    nitroGLYCERIN (NITROSTAT) 0.4 MG SL tablet Place 1 tablet (0.4 mg total) under the tongue every 5 (five) minutes as needed for Chest pain.            While in the hospital, will manage blood pressure as follows; Continue home antihypertensive regimen    Will utilize p.r.n. blood pressure medication only if patient's blood pressure greater than 180/110 and he develops symptoms such as worsening chest pain or shortness of breath.      VTE Risk Mitigation (From admission, onward)           Ordered     heparin (porcine) injection  As needed (PRN)         02/26/24 1638     heparin infusion 1,000 units/500 ml in 0.9% NaCl (on sterile field)  As needed (PRN)         02/26/24 1620     heparin 25,000 units in dextrose 5% (100 units/ml) IV bolus from bag LOW INTENSITY nomogram - [facility]  As needed (PRN)        Question:  Heparin Infusion Adjustment (DO NOT MODIFY ANSWER)  Answer:  \\[email or web address]\epic\Images\Pharmacy\HeparinInfusions\heparin LOW INTENSITY nomogram for [facility] [id number]    02/25/24 1129     heparin 25,000 units in dextrose 5% (100 units/ml) IV bolus from bag LOW INTENSITY nomogram - [facility]  As needed (PRN)        Question:  Heparin Infusion Adjustment (DO NOT MODIFY ANSWER)  Answer:  \\[email or web address]\epic\Images\Pharmacy\HeparinInfusions\heparin LOW INTENSITY nomogram for [facility] [id number].pdf    02/25/24 1129     heparin 25,000 units in dextrose 5% 250 mL (100 units/mL) infusion LOW INTENSITY nomogram - [facility]   Continuous        Question:  Begin at (units/kg/hr)  Answer:  12    02/25/24 1129     IP VTE HIGH RISK PATIENT  Once         02/23/24 1122     Place sequential compression device  Until discontinued         02/23/24 1122                    Discharge Planning   RAMU: 2/29/2024     Code Status: Full Code   Is the patient medically ready for discharge?: No    Reason for patient still in hospital (select all that apply): Patient trending condition  Discharge Plan A: Home with family                  Yamil Silva DO  Department of Hospital Medicine   [facility] - Cardiology Step[facility]

## 2024-02-28 NOTE — SUBJECTIVE & OBJECTIVE
Subjective:   History of Present Illness:    68 year old male with ESRD s/p kidney transplant x2 (1992, 2005) on chronic immunosuppressive medications Prograf 3mg BID and Prednisone 5mg QD, CAD s/p PCI (2006), HTN, HLD, Chronic diastolic CHF, tachycardia-bradycardia syndrome with periods of pAF admitted to vascular surgery.     C/C; Pt presented with crushing chest pain. Trop elevated to .08. ECG with ST depressions in inferior leads and dynamic changes. Bedside ECHO without WMA. Discussed case with IC staff. Initial plan for LHC although chest pain improved after 3 nitro and morphine.     Last admission; Presented for outpatient fistulogram (recently had the fistula created on 12/18/23) with plans to start HD however had a period of unresponsiveness after the procedure. Pt was drowsy but arousable and able to answer questions after the episode. Wife reports he's had episodes similar to this for about 1.5 years however he has never been evaluated for them. Wife denies associated urinary or fecal incontinence, tongue biting, or whole body shaking associated with these episodes. Pt has an essential tremor and always has BL arm/hand shaking which is at it's baseline per Pt and wife. No personal or FHx seizure. Does not measure his BP or HR at home or during these episodes of unresponsiveness.      KTM consulted for immunosuppressive medication management and need for dialysis.    Mr. Phelps is a 68 y.o. year old male who is status post Kidney Transplant Referral - 10/24/2023 - Declined.    His maintenance immunosuppression consists of:   Immunosuppressants (From admission, onward)      Start     Stop Route Frequency Ordered    02/24/24 0800  tacrolimus capsule 3 mg         -- Oral 2 times daily 02/23/24 1327          Interval History:  No acute events overnight, had last HD on 02/26, probably would undergo cardiac procedure today(Stents placement), will assess him after the cardiac procedure if we can dialyze him  today otherwise will do dialysis tomorrow, electrolytes stable, rising Scr/BUN.    Past Medical, Surgical, Family, and Social History:   Unchanged from H&P.    Scheduled Meds:   sodium chloride 0.9%   Intravenous Once    amiodarone  200 mg Oral Daily    aspirin  81 mg Oral Daily    atorvastatin  80 mg Oral Daily    diphenhydrAMINE  50 mg Oral Q6H    doxazosin  4 mg Oral Q12H    famotidine  20 mg Oral Daily    famotidine  20 mg Oral Q6H    guaiFENesin  600 mg Oral BID    hydrALAZINE  50 mg Oral Q8H    metoprolol succinate  12.5 mg Oral Daily    multivitamin  1 tablet Oral Daily    mupirocin   Nasal BID    NIFEdipine  60 mg Oral BID    predniSONE  5 mg Oral Daily    predniSONE  50 mg Oral Q6H    sevelamer carbonate  800 mg Oral TID WM    tacrolimus  3 mg Oral BID    tiotropium bromide  2 puff Inhalation Daily     Continuous Infusions:   heparin (porcine) in D5W 15 Units/kg/hr (02/28/24 0722)     PRN Meds:0.9%  NaCl infusion (for blood administration), sodium chloride 0.9%, acetaminophen, albuterol-ipratropium, fentaNYL, gabapentin, heparin (porcine), heparin (PORCINE), heparin (PORCINE), heparin (porcine), hydrALAZINE, HYDROcodone-acetaminophen, iohexoL, LIDOcaine HCL 20 mg/ml (2%), melatonin, methylPREDNISolone sodium succinate, midazolam, nitroGLYCERIN in D5W 200 mcg/mL, nitroGLYCERIN, predniSONE, sodium chloride 0.9%, sodium chloride 0.9%, sodium chloride 0.9%    Intake/Output - Last 3 Shifts         02/26 0700 02/27 0659 02/27 0700 02/28 0659 02/28 0700 02/29 0659    P.O.  270     Blood 381.7      Other 500      Total Intake(mL/kg) 881.7 (10) 270 (3.2)     Urine (mL/kg/hr) 750 (0.4) 0 (0)     Other 3000      Stool       Total Output 3750 0     Net -2868.3 +270                     Review of Systems   Constitutional: Negative.    HENT: Negative.     Eyes: Negative.    Respiratory: Negative.     Cardiovascular: Negative.    Gastrointestinal: Negative.    Endocrine: Negative.    Genitourinary: Negative.   "  Musculoskeletal: Negative.    Allergic/Immunologic: Negative.    Neurological: Negative.    Hematological: Negative.    Psychiatric/Behavioral: Negative.        Objective:     Vital Signs (Most Recent):  Temp: 98.2 °F (36.8 °C) (02/28/24 0750)  Pulse: 66 (02/28/24 0957)  Resp: 17 (02/28/24 0957)  BP: 138/64 (02/28/24 0750)  SpO2: (!) 94 % (02/28/24 0957) Vital Signs (24h Range):  Temp:  [97.9 °F (36.6 °C)-98.7 °F (37.1 °C)] 98.2 °F (36.8 °C)  Pulse:  [52-68] 66  Resp:  [16-18] 17  SpO2:  [94 %-96 %] 94 %  BP: (115-145)/(56-67) 138/64     Weight: 85.5 kg (188 lb 7.9 oz)  Height: 6' 1" (185.4 cm)  Body mass index is 24.87 kg/m².     Physical Exam  Constitutional:       Appearance: Normal appearance.   Cardiovascular:      Rate and Rhythm: Normal rate.   Pulmonary:      Effort: Pulmonary effort is normal.   Abdominal:      Palpations: Abdomen is soft.   Musculoskeletal:         General: Normal range of motion.   Skin:     General: Skin is warm.   Neurological:      General: No focal deficit present.      Mental Status: He is alert and oriented to person, place, and time.   Psychiatric:         Mood and Affect: Mood normal.         Behavior: Behavior normal.         Judgment: Judgment normal.          Laboratory:  CBC:   Recent Labs   Lab 02/26/24  1934 02/27/24 0148 02/28/24  0749   WBC 6.07 5.08 9.77   RBC 3.28* 3.36* 2.99*   HGB 8.5* 8.8* 7.8*   HCT 26.9* 26.8* 24.5*   * 131* 141*   MCV 82 80* 82   MCH 25.9* 26.2* 26.1*   MCHC 31.6* 32.8 31.8*     BMP:   Recent Labs   Lab 02/26/24  0339 02/27/24  0148 02/28/24  0749   GLU 75 150* 97    142 140   K 3.6 4.1 3.7    104 104   CO2 21* 24 24   BUN 64* 32* 54*   CREATININE 7.0* 4.2* 6.3*   CALCIUM 7.6* 8.6* 7.8*       Diagnostic Results:  None  "

## 2024-02-28 NOTE — PLAN OF CARE
Problem: Adult Inpatient Plan of Care  Goal: Plan of Care Review  Outcome: Ongoing, Progressing  Goal: Patient-Specific Goal (Individualized)  Outcome: Ongoing, Progressing  Goal: Absence of Hospital-Acquired Illness or Injury  Outcome: Ongoing, Progressing  Goal: Optimal Comfort and Wellbeing  Outcome: Ongoing, Progressing  Goal: Readiness for Transition of Care  Outcome: Ongoing, Progressing     Problem: Fall Injury Risk  Goal: Absence of Fall and Fall-Related Injury  Outcome: Ongoing, Progressing     Problem: Device-Related Complication Risk (Hemodialysis)  Goal: Safe, Effective Therapy Delivery  Outcome: Ongoing, Progressing     Problem: Hemodynamic Instability (Hemodialysis)  Goal: Effective Tissue Perfusion  Outcome: Ongoing, Progressing

## 2024-02-28 NOTE — PROGRESS NOTES
[facility] - Cardiology Stepdown  Kidney Transplant  Progress Note      Reason for Follow-up: Reassessment of Kidney Transplant Referral - 10/24/2023 - Declined recipient and management of immunosuppression.       Subjective:   History of Present Illness:    68 year old male with ESRD s/p kidney transplant x2 (1992, 2005) on chronic immunosuppressive medications Prograf 3mg BID and Prednisone 5mg QD, CAD s/p PCI (2006), HTN, HLD, Chronic diastolic CHF, tachycardia-bradycardia syndrome with periods of pAF admitted to vascular surgery.     C/C; Pt presented with crushing chest pain. Trop elevated to .08. ECG with ST depressions in inferior leads and dynamic changes. Bedside ECHO without WMA. Discussed case with IC staff. Initial plan for LHC although chest pain improved after 3 nitro and morphine.     Last admission; Presented for outpatient fistulogram (recently had the fistula created on 12/18/23) with plans to start HD however had a period of unresponsiveness after the procedure. Pt was drowsy but arousable and able to answer questions after the episode. Wife reports he's had episodes similar to this for about 1.5 years however he has never been evaluated for them. Wife denies associated urinary or fecal incontinence, tongue biting, or whole body shaking associated with these episodes. Pt has an essential tremor and always has BL arm/hand shaking which is at it's baseline per Pt and wife. No personal or FHx seizure. Does not measure his BP or HR at home or during these episodes of unresponsiveness.      KTM consulted for immunosuppressive medication management and need for dialysis.    Mr. Phelps is a 68 y.o. year old male who is status post Kidney Transplant Referral - 10/24/2023 - Declined.    His maintenance immunosuppression consists of:   Immunosuppressants (From admission, onward)      Start     Stop Route Frequency Ordered    02/24/24 0800  tacrolimus capsule 3 mg         -- Oral 2 times daily 02/23/24 7277           Interval History:  No acute events overnight, had last HD on 02/26, probably would undergo cardiac procedure today(Stents placement), will do dialysis today later on after the cardiac procedure, electrolytes stable, rising Scr/BUN.    Past Medical, Surgical, Family, and Social History:   Unchanged from H&P.    Scheduled Meds:   sodium chloride 0.9%   Intravenous Once    amiodarone  200 mg Oral Daily    aspirin  81 mg Oral Daily    atorvastatin  80 mg Oral Daily    diphenhydrAMINE  50 mg Oral Q6H    doxazosin  4 mg Oral Q12H    famotidine  20 mg Oral Daily    famotidine  20 mg Oral Q6H    guaiFENesin  600 mg Oral BID    hydrALAZINE  50 mg Oral Q8H    metoprolol succinate  12.5 mg Oral Daily    multivitamin  1 tablet Oral Daily    mupirocin   Nasal BID    NIFEdipine  60 mg Oral BID    predniSONE  5 mg Oral Daily    predniSONE  50 mg Oral Q6H    sevelamer carbonate  800 mg Oral TID WM    tacrolimus  3 mg Oral BID    tiotropium bromide  2 puff Inhalation Daily     Continuous Infusions:   heparin (porcine) in D5W 15 Units/kg/hr (02/28/24 0722)     PRN Meds:0.9%  NaCl infusion (for blood administration), sodium chloride 0.9%, acetaminophen, albuterol-ipratropium, fentaNYL, gabapentin, heparin (porcine), heparin (PORCINE), heparin (PORCINE), heparin (porcine), hydrALAZINE, HYDROcodone-acetaminophen, iohexoL, LIDOcaine HCL 20 mg/ml (2%), melatonin, methylPREDNISolone sodium succinate, midazolam, nitroGLYCERIN in D5W 200 mcg/mL, nitroGLYCERIN, predniSONE, sodium chloride 0.9%, sodium chloride 0.9%, sodium chloride 0.9%    Intake/Output - Last 3 Shifts         02/26 0700 02/27 0659 02/27 0700 02/28 0659 02/28 0700 02/29 0659    P.O.  270     Blood 381.7      Other 500      Total Intake(mL/kg) 881.7 (10) 270 (3.2)     Urine (mL/kg/hr) 750 (0.4) 0 (0)     Other 3000      Stool       Total Output 3750 0     Net -2868.3 +270                     Review of Systems   Constitutional: Negative.    HENT: Negative.     Eyes:  "Negative.    Respiratory: Negative.     Cardiovascular: Negative.    Gastrointestinal: Negative.    Endocrine: Negative.    Genitourinary: Negative.    Musculoskeletal: Negative.    Allergic/Immunologic: Negative.    Neurological: Negative.    Hematological: Negative.    Psychiatric/Behavioral: Negative.        Objective:     Vital Signs (Most Recent):  Temp: 98.2 °F (36.8 °C) (02/28/24 0750)  Pulse: 66 (02/28/24 0957)  Resp: 17 (02/28/24 0957)  BP: 138/64 (02/28/24 0750)  SpO2: (!) 94 % (02/28/24 0957) Vital Signs (24h Range):  Temp:  [97.9 °F (36.6 °C)-98.7 °F (37.1 °C)] 98.2 °F (36.8 °C)  Pulse:  [52-68] 66  Resp:  [16-18] 17  SpO2:  [94 %-96 %] 94 %  BP: (115-145)/(56-67) 138/64     Weight: 85.5 kg (188 lb 7.9 oz)  Height: 6' 1" (185.4 cm)  Body mass index is 24.87 kg/m².     Physical Exam  Constitutional:       Appearance: Normal appearance.   Cardiovascular:      Rate and Rhythm: Normal rate.   Pulmonary:      Effort: Pulmonary effort is normal.   Abdominal:      Palpations: Abdomen is soft.   Musculoskeletal:         General: Normal range of motion.   Skin:     General: Skin is warm.   Neurological:      General: No focal deficit present.      Mental Status: He is alert and oriented to person, place, and time.   Psychiatric:         Mood and Affect: Mood normal.         Behavior: Behavior normal.         Judgment: Judgment normal.          Laboratory:  CBC:   Recent Labs   Lab 02/26/24  1934 02/27/24 0148 02/28/24  0749   WBC 6.07 5.08 9.77   RBC 3.28* 3.36* 2.99*   HGB 8.5* 8.8* 7.8*   HCT 26.9* 26.8* 24.5*   * 131* 141*   MCV 82 80* 82   MCH 25.9* 26.2* 26.1*   MCHC 31.6* 32.8 31.8*     BMP:   Recent Labs   Lab 02/26/24  0339 02/27/24 0148 02/28/24  0749   GLU 75 150* 97    142 140   K 3.6 4.1 3.7    104 104   CO2 21* 24 24   BUN 64* 32* 54*   CREATININE 7.0* 4.2* 6.3*   CALCIUM 7.6* 8.6* 7.8*       Diagnostic Results:  None  Assessment/Plan:     Post Transplant CKD stage 5, GFR < 15, " on Dialysis;  S/P KT  CKD V with baseline Scr 2.8-3.2, eGFR < 15, dialysis dependent  CXR; pulmonary infiltrates with edema picture, having SOB on exertion, BNP 1900  LUE AVG functional according to Vasular surgeon, good bruit and palpable thrill on examination  After discussing multiple times the need for dialysis and multiple refusals from the patient, now consented for dialysis and today would be receiving second session.    Cardiology procedure today 02/28    Plan;  -HD session later on today after the cardiac procedure  -I/Os  -Daily CBC, BMP, 12h FK levels     Anemia;  HgB 7.8 today          H/O kidney transplant  S/P 2 KT 1992 and 2005  Home immunosuppresants; Tacro 3mg BID and Pred 5mg QD  Baseline Scr 2.8-3.2, CKD V  Would need tapering of Prograf (3mg BID --> 2 months, 2mg BID --> 2 months, 1mg BID 2 months and then at last 1mg QD for 1 month)  Will keep on 3mg BID prograf for now for 2 months            CAD (coronary artery disease)  CAD s/p CABG 2006  NSTEMI 02/23/23   Elevated Trops 13 on 02/25, 10 on 02/26, tripple vessel disease, not fit for CABG per interventional cardiology  Planing for stent placement 02/28     Electrolytes;  Non emergent          Acidosis;       HCO3 24       On NaHCO3 1300mg BID         Hypertension;      On Hydralazine 50mg TID, Nifedipine 60 QD        Cece James MD  Kidney Transplant  [facility] - Cardiology Stepdown

## 2024-02-28 NOTE — BRIEF OP NOTE
Brief Operative Note:    : Tylor Lincoln MD     Referring Physician: Self,Aaareferral     All Operators: Surgeon(s):  Tylor Lincoln MD Hallak, MD Boy Mejía Arthur P, MD     Preoperative Diagnosis: NSTEMI (non-ST elevated myocardial infarction) [I21.4]     Postop Diagnosis: NSTEMI (non-ST elevated myocardial infarction) [I21.4]    Treatments/Procedures: Procedure(s) (LRB):  Stent, Drug Eluting, Single Vessel, Coronary (N/A)    Findings: Successful PCI of RCA with 2x DIEGO. See catheterization report for full details.    Estimated Blood loss: 20 cc    Specimens removed: No    Recommendations:   - Routine post-cath care as per orders  - aspirin, plavix, and eliquis for 1 month followed by plavix and eliquis for life  Continue medical management, Risk factor reduction, Follow-up with outpatient cardiologist    Nadeem Fortune

## 2024-02-28 NOTE — PROGRESS NOTES
[facility] - Cardiology Stepdown  Interventional Cardiology  Progress Note    Patient Name: Ibrahima Phelps  MRN: [id number]  Admission Date: 2/23/2024  Hospital Length of Stay: 5 days  Code Status: Full Code   Attending Physician: Yamil Silva DO   Primary Care Physician: Anatoliy Colindres MD  Principal Problem:NSTEMI (non-ST elevated myocardial infarction)    Subjective:     Interval History: NAEO. No acute cardiac complaints.    Objective:     Vital Signs (Most Recent):  Temp: 98 °F (36.7 °C) (02/28/24 1132)  Pulse: 61 (02/28/24 1132)  Resp: 18 (02/28/24 1132)  BP: (!) 119/58 (02/28/24 1132)  SpO2: 96 % (02/28/24 1132) Vital Signs (24h Range):  Temp:  [97.9 °F (36.6 °C)-98.4 °F (36.9 °C)] 98 °F (36.7 °C)  Pulse:  [52-66] 61  Resp:  [16-18] 18  SpO2:  [94 %-96 %] 96 %  BP: (115-145)/(56-67) 119/58     Weight: 85.5 kg (188 lb 7.9 oz)  Body mass index is 24.87 kg/m².    SpO2: 96 %         Intake/Output Summary (Last 24 hours) at 2/28/2024 1329  Last data filed at 2/28/2024 0523  Gross per 24 hour   Intake 270 ml   Output 0 ml   Net 270 ml       Lines/Drains/Airways       Peripheral Intravenous Line  Duration                  Hemodialysis AV Fistula 02/19/24 0924 Left upper arm 9 days         Peripheral IV - Single Lumen 02/23/24 0802 20 G Anterior;Right Forearm 5 days                     Physical Exam  Constitutional:       General: He is in acute distress.      Appearance: He is ill-appearing. He is not diaphoretic.   HENT:      Head: Normocephalic and atraumatic.      Nose: No congestion.      Mouth/Throat:      Mouth: Mucous membranes are dry.   Eyes:      General: No scleral icterus.     Extraocular Movements: Extraocular movements intact.      Pupils: Pupils are equal, round, and reactive to light.   Cardiovascular:      Rate and Rhythm: Normal rate and regular rhythm.      Heart sounds: No murmur heard.     No gallop.      Comments: Pulses:  Carotid pulses are 2+ on the right side, and 2+ on the left  side.  Radial pulses are 1+ on the right side, and 1+ on the left side.   Femoral pulses are 2+ on the right side, and 2+ on the left side.   Dorsalis pedis pulses are 2+ on the right side, and 2+ on the left side.      Pulmonary:      Effort: No respiratory distress.      Breath sounds: No rhonchi or rales.   Chest:      Chest wall: No tenderness.   Abdominal:      General: Bowel sounds are normal. There is no distension.      Tenderness: There is no abdominal tenderness.   Musculoskeletal:         General: No swelling or tenderness.      Cervical back: Normal range of motion. No rigidity.      Comments: Left UE AC Fistula +   Skin:     Capillary Refill: Capillary refill takes less than 2 seconds.      Coloration: Skin is not jaundiced.      Findings: No rash.   Neurological:      General: No focal deficit present.      Mental Status: He is alert and oriented to person, place, and time.   Psychiatric:         Mood and Affect: Mood normal.            Significant Labs: All pertinent lab results from the last 24 hours have been reviewed.  Assessment and Plan:     Patient is a 68 y.o. male presenting with:    Cardiac/Vascular  * NSTEMI (non-ST elevated myocardial infarction)  Multivessel CAD  Proceed with PCI  69 yo male with a PMHx of paroxysmal atrial fibrillation on amiodarone 200mg qd and apixaban 5mg bid, tachy-loan syndrome, HFpEF with most recent LVEF 65%, CAD s/p PCI 2006 on plavix, HTN, HLD, aortic atherosclerosis, secondary hyperparathyroidism, diverticulosis, COPD, GERD, hypothyroidism, gout, a history of tobacco use, obesity, ESRD s/p kidney transplant x2 (1992, 2005) on chronic immunosuppressive medication who was admitted to [facility] on 02/23/2024 with crushing chest pain. Interventional Cardiology was consulted for ischemic evaluation.      - Troponin 10.66 (2/26) vs 13.2 (2/25) vs 4.2 (2/23)  - Awaiting ECHO   - Hb 6.4, Creat 7.0  - We will do LHC  after the pRBC transfusion today  - Keep NPO  - Continue  treatment with ASA, atorvastatin 80mg qd, metoprolol succinate 12.5mg qd  - Continue heparin gtt  - LHC +/- PCI, patient is a DIEGO candidate  - Anti-platelet Therapy: ASA  and plavix  - Access: Right radial/CFA  - Allergies: Itching with Povidone Iodine  - Pre-Hydration: NS  - Pre-Op Med: Bendaryl 50mg pO and solumedrol 125mg  - All patient's questions were answered.  -The risks, benefits and alternatives of the procedure were explained to the patient.   -The risks of coronary angiography include but are not limited to: bleeding, infection, heart rhythm abnormalities, allergic reactions, kidney injury and potential need for dialysis, stroke and death.   - Should stenting be indicated, the patient has agreed to dual anti-platelet therapy for 1-consecutive year with a drug-eluting stent and a minimum of 1-month with the use of a bare metal stent  - Additionally, pt is aware that non-compliance is likely to result in stent clotting with heart attack, heart failure, and/or death  -The risks of moderate sedation include hypotension, respiratory depression, arrhythmias, bronchospasm, and death.   - Informed consent was obtained and the  patient is agreeable to proceed with the procedure.            VTE Risk Mitigation (From admission, onward)           Ordered     heparin (porcine) injection  As needed (PRN)         02/26/24 1638     heparin infusion 1,000 units/500 ml in 0.9% NaCl (on sterile field)  As needed (PRN)         02/26/24 1620     heparin 25,000 units in dextrose 5% (100 units/ml) IV bolus from bag LOW INTENSITY nomogram - [facility]  As needed (PRN)        Question:  Heparin Infusion Adjustment (DO NOT MODIFY ANSWER)  Answer:  [email or web address]\epic\Images\Pharmacy\HeparinInfusions\heparin LOW INTENSITY nomogram for [facility] [id number]    02/25/24 1129     heparin 25,000 units in dextrose 5% (100 units/ml) IV bolus from bag LOW INTENSITY nomogram - [facility]  As needed (PRN)        Question:  Heparin Infusion Adjustment (DO NOT  MODIFY ANSWER)  Answer:  [email or web address]\epic\Images\Pharmacy\HeparinInfusions\heparin LOW INTENSITY nomogram for [facility] [id number]    02/25/24 1129     heparin 25,000 units in dextrose 5% 250 mL (100 units/mL) infusion LOW INTENSITY nomogram - [facility]  Continuous        Question:  Begin at (units/kg/hr)  Answer:  12    02/25/24 1129     IP VTE HIGH RISK PATIENT  Once         02/23/24 1122     Place sequential compression device  Until discontinued         02/23/24 1122                    Nadeem Fortune MD  Interventional Cardiology  [facility] - Cardiology Stepdown

## 2024-02-28 NOTE — PROGRESS NOTES
Please see previous notes from this SW for continuity.    ----------------------  SW contacted [facility] for an update regarding pt's dialysis referral. Per clinic manager Margarita, pt's referral currently pending medical director approval.    SW to continue to follow with updates.    Jeanna Rodriguez, ROLANDO  [facility]  [phone]     details…

## 2024-02-29 LAB
ANION GAP SERPL CALC-SCNC: 12 MMOL/L (ref 8–16)
BASOPHILS # BLD AUTO: 0 K/UL (ref 0–0.2)
BASOPHILS NFR BLD: 0 % (ref 0–1.9)
BUN SERPL-MCNC: 61 MG/DL (ref 8–23)
CALCIUM SERPL-MCNC: 7.5 MG/DL (ref 8.7–10.5)
CHLORIDE SERPL-SCNC: 105 MMOL/L (ref 95–110)
CO2 SERPL-SCNC: 23 MMOL/L (ref 23–29)
CREAT SERPL-MCNC: 6.8 MG/DL (ref 0.5–1.4)
DIFFERENTIAL METHOD BLD: ABNORMAL
EOSINOPHIL # BLD AUTO: 0 K/UL (ref 0–0.5)
EOSINOPHIL NFR BLD: 0 % (ref 0–8)
ERYTHROCYTE [DISTWIDTH] IN BLOOD BY AUTOMATED COUNT: 17.2 % (ref 11.5–14.5)
EST. GFR  (NO RACE VARIABLE): 8.2 ML/MIN/1.73 M^2
GLUCOSE SERPL-MCNC: 138 MG/DL (ref 70–110)
HCT VFR BLD AUTO: 25.1 % (ref 40–54)
HGB BLD-MCNC: 8.2 G/DL (ref 14–18)
IMM GRANULOCYTES # BLD AUTO: 0.04 K/UL (ref 0–0.04)
IMM GRANULOCYTES NFR BLD AUTO: 0.5 % (ref 0–0.5)
LYMPHOCYTES # BLD AUTO: 0.5 K/UL (ref 1–4.8)
LYMPHOCYTES NFR BLD: 6.1 % (ref 18–48)
MAGNESIUM SERPL-MCNC: 1.9 MG/DL (ref 1.6–2.6)
MCH RBC QN AUTO: 26.7 PG (ref 27–31)
MCHC RBC AUTO-ENTMCNC: 32.7 G/DL (ref 32–36)
MCV RBC AUTO: 82 FL (ref 82–98)
MONOCYTES # BLD AUTO: 0.3 K/UL (ref 0.3–1)
MONOCYTES NFR BLD: 4.1 % (ref 4–15)
NEUTROPHILS # BLD AUTO: 6.7 K/UL (ref 1.8–7.7)
NEUTROPHILS NFR BLD: 89.3 % (ref 38–73)
NRBC BLD-RTO: 0 /100 WBC
PHOSPHATE SERPL-MCNC: 3.6 MG/DL (ref 2.7–4.5)
PLATELET # BLD AUTO: 145 K/UL (ref 150–450)
PMV BLD AUTO: 13.5 FL (ref 9.2–12.9)
POCT GLUCOSE: 137 MG/DL (ref 70–110)
POCT GLUCOSE: 144 MG/DL (ref 70–110)
POCT GLUCOSE: 154 MG/DL (ref 70–110)
POTASSIUM SERPL-SCNC: 4.4 MMOL/L (ref 3.5–5.1)
RBC # BLD AUTO: 3.07 M/UL (ref 4.6–6.2)
SODIUM SERPL-SCNC: 140 MMOL/L (ref 136–145)
WBC # BLD AUTO: 7.51 K/UL (ref 3.9–12.7)

## 2024-02-29 PROCEDURE — 25000003 PHARM REV CODE 250: Performed by: INTERNAL MEDICINE

## 2024-02-29 PROCEDURE — 99233 SBSQ HOSP IP/OBS HIGH 50: CPT | Mod: GC,,, | Performed by: INTERNAL MEDICINE

## 2024-02-29 PROCEDURE — 83735 ASSAY OF MAGNESIUM: CPT | Performed by: STUDENT IN AN ORGANIZED HEALTH CARE EDUCATION/TRAINING PROGRAM

## 2024-02-29 PROCEDURE — 63600175 PHARM REV CODE 636 W HCPCS: Performed by: INTERNAL MEDICINE

## 2024-02-29 PROCEDURE — 25000003 PHARM REV CODE 250

## 2024-02-29 PROCEDURE — 84100 ASSAY OF PHOSPHORUS: CPT | Performed by: STUDENT IN AN ORGANIZED HEALTH CARE EDUCATION/TRAINING PROGRAM

## 2024-02-29 PROCEDURE — 25000003 PHARM REV CODE 250: Performed by: STUDENT IN AN ORGANIZED HEALTH CARE EDUCATION/TRAINING PROGRAM

## 2024-02-29 PROCEDURE — 36415 COLL VENOUS BLD VENIPUNCTURE: CPT | Performed by: STUDENT IN AN ORGANIZED HEALTH CARE EDUCATION/TRAINING PROGRAM

## 2024-02-29 PROCEDURE — 99900035 HC TECH TIME PER 15 MIN (STAT)

## 2024-02-29 PROCEDURE — 80048 BASIC METABOLIC PNL TOTAL CA: CPT | Performed by: STUDENT IN AN ORGANIZED HEALTH CARE EDUCATION/TRAINING PROGRAM

## 2024-02-29 PROCEDURE — 90935 HEMODIALYSIS ONE EVALUATION: CPT

## 2024-02-29 PROCEDURE — 85025 COMPLETE CBC W/AUTO DIFF WBC: CPT | Performed by: STUDENT IN AN ORGANIZED HEALTH CARE EDUCATION/TRAINING PROGRAM

## 2024-02-29 PROCEDURE — 63600175 PHARM REV CODE 636 W HCPCS

## 2024-02-29 PROCEDURE — 20600001 HC STEP DOWN PRIVATE ROOM

## 2024-02-29 RX ORDER — SODIUM CHLORIDE 9 MG/ML
INJECTION, SOLUTION INTRAVENOUS
Status: DISCONTINUED | OUTPATIENT
Start: 2024-02-29 | End: 2024-03-02 | Stop reason: HOSPADM

## 2024-02-29 RX ORDER — SODIUM CHLORIDE 9 MG/ML
INJECTION, SOLUTION INTRAVENOUS ONCE
Status: DISCONTINUED | OUTPATIENT
Start: 2024-02-29 | End: 2024-03-02 | Stop reason: HOSPADM

## 2024-02-29 RX ADMIN — PREDNISONE 50 MG: 50 TABLET ORAL at 05:02

## 2024-02-29 RX ADMIN — PREDNISONE 50 MG: 50 TABLET ORAL at 12:02

## 2024-02-29 RX ADMIN — DIPHENHYDRAMINE HYDROCHLORIDE 50 MG: 50 CAPSULE ORAL at 05:02

## 2024-02-29 RX ADMIN — HYDRALAZINE HYDROCHLORIDE 50 MG: 50 TABLET ORAL at 05:02

## 2024-02-29 RX ADMIN — GUAIFENESIN 600 MG: 600 TABLET, EXTENDED RELEASE ORAL at 12:02

## 2024-02-29 RX ADMIN — HYDRALAZINE HYDROCHLORIDE 50 MG: 50 TABLET ORAL at 09:02

## 2024-02-29 RX ADMIN — TACROLIMUS 3 MG: 1 CAPSULE ORAL at 06:02

## 2024-02-29 RX ADMIN — NIFEDIPINE 60 MG: 60 TABLET, FILM COATED, EXTENDED RELEASE ORAL at 09:02

## 2024-02-29 RX ADMIN — GUAIFENESIN 600 MG: 600 TABLET, EXTENDED RELEASE ORAL at 09:02

## 2024-02-29 RX ADMIN — SODIUM CHLORIDE: 9 INJECTION, SOLUTION INTRAVENOUS at 08:02

## 2024-02-29 RX ADMIN — ATORVASTATIN CALCIUM 80 MG: 20 TABLET, FILM COATED ORAL at 12:02

## 2024-02-29 RX ADMIN — PREDNISONE 5 MG: 2.5 TABLET ORAL at 12:02

## 2024-02-29 RX ADMIN — HYDRALAZINE HYDROCHLORIDE 50 MG: 50 TABLET ORAL at 02:02

## 2024-02-29 RX ADMIN — AMIODARONE HYDROCHLORIDE 200 MG: 200 TABLET ORAL at 12:02

## 2024-02-29 RX ADMIN — Medication 6 MG: at 08:02

## 2024-02-29 RX ADMIN — DOXAZOSIN 4 MG: 2 TABLET ORAL at 12:02

## 2024-02-29 RX ADMIN — TACROLIMUS 3 MG: 1 CAPSULE ORAL at 12:02

## 2024-02-29 RX ADMIN — APIXABAN 5 MG: 5 TABLET, FILM COATED ORAL at 12:02

## 2024-02-29 RX ADMIN — APIXABAN 5 MG: 5 TABLET, FILM COATED ORAL at 09:02

## 2024-02-29 RX ADMIN — ASPIRIN 81 MG: 81 TABLET, COATED ORAL at 12:02

## 2024-02-29 RX ADMIN — METOPROLOL SUCCINATE 12.5 MG: 25 TABLET, EXTENDED RELEASE ORAL at 12:02

## 2024-02-29 RX ADMIN — THERA TABS 1 TABLET: TAB at 12:02

## 2024-02-29 RX ADMIN — CLOPIDOGREL BISULFATE 75 MG: 75 TABLET ORAL at 12:02

## 2024-02-29 RX ADMIN — NIFEDIPINE 60 MG: 60 TABLET, FILM COATED, EXTENDED RELEASE ORAL at 12:02

## 2024-02-29 RX ADMIN — DOXAZOSIN 4 MG: 2 TABLET ORAL at 08:02

## 2024-02-29 RX ADMIN — SEVELAMER CARBONATE 800 MG: 800 TABLET, FILM COATED ORAL at 04:02

## 2024-02-29 RX ADMIN — FAMOTIDINE 20 MG: 20 TABLET ORAL at 12:02

## 2024-02-29 RX ADMIN — SEVELAMER CARBONATE 800 MG: 800 TABLET, FILM COATED ORAL at 12:02

## 2024-02-29 NOTE — ASSESSMENT & PLAN NOTE
Chronic, controlled. Latest blood pressure and vitals reviewed-     Temp:  [97.4 °F (36.3 °C)-98.8 °F (37.1 °C)]   Pulse:  [51-72]   Resp:  [18]   BP: (118-155)/(58-74)   SpO2:  [94 %-100 %] .   Home meds for hypertension were reviewed and noted below.   Hypertension Medications               doxazosin (CARDURA) 4 MG tablet Take 1 tablet (4 mg total) by mouth every 12 (twelve) hours.    furosemide (LASIX) 80 MG tablet Take 1 tablet (80 mg total) by mouth 2 (two) times a day.    hydrALAZINE (APRESOLINE) 50 MG tablet TAKE 1 TABLET (50 MG TOTAL) BY MOUTH 3 (THREE) TIMES DAILY.    isosorbide mononitrate (IMDUR) 30 MG 24 hr tablet Take 4 tablets (120 mg total) by mouth once daily.    metoprolol succinate (TOPROL-XL) 25 MG 24 hr tablet Take 0.5 tablets (12.5 mg total) by mouth once daily.    NIFEdipine (PROCARDIA-XL) 60 MG (OSM) 24 hr tablet Take 1 tablet (60 mg total) by mouth 2 (two) times a day.    nitroGLYCERIN (NITROSTAT) 0.4 MG SL tablet Place 1 tablet (0.4 mg total) under the tongue every 5 (five) minutes as needed for Chest pain.            While in the hospital, will manage blood pressure as follows; Continue home antihypertensive regimen    Will utilize p.r.n. blood pressure medication only if patient's blood pressure greater than 180/110 and he develops symptoms such as worsening chest pain or shortness of breath.

## 2024-02-29 NOTE — ASSESSMENT & PLAN NOTE
"- Failed renal transplant, now ESRD  - KTM following- Would need tapering of Prograf (3mg BID --> 2 months, 2mg BID --> 2 months, 1mg BID 2 months and then at last 1mg QD for 1 month)--> "Will keep on 3mg BID prograf for now for 2 months"    "

## 2024-02-29 NOTE — PROGRESS NOTES
Please see previous notes from this SW for continuity.    ----------------------  Attempted to meet with pt and pt's spouse at bedside to update regarding pt's outpt dilaysis referral. Pt's spouse not in room, pt asleep.    SW attempted to call pt's spouse 2x to update regarding dialysis referral. No answer.    SW will attempt to update pt/pt's spouse again.    Jeanna Rodriguez, ROLANDO  [facility]  [phone]

## 2024-02-29 NOTE — PROGRESS NOTES
02/28/24 1830   Vital Signs   BP (!) 152/70   MAP (mmHg) 101     Dr. Lobo aware. No new order.

## 2024-02-29 NOTE — PROGRESS NOTES
Please see previous notes from this SW for continuity.    ----------------------    SW received notification from [facility] clinic manager Margarita. Pt accepted for outpt dialysis at this unit with sd Gambino MD. Pt will be on a T/Th/S schedule. Pt can initiate dialysis on Tuesday, as [facility] only has ancillary staff on the weekends.    Please see below for pt's outpt dialysis information:    -[facility]  -[address] [place], [place]  [phone]   -Schedule: T/Th/S at 12:00PM  -Anticipated start date: 03/05/2024  **Pt must arrive 30 minutes early to initial appt to sign consents**      Inpt CM Bray updated via secure chat.    SW to continue to follow with updates.    Jeanna Rodriguez LMSW  [facility]  [phone]

## 2024-02-29 NOTE — PLAN OF CARE
Problem: Adult Inpatient Plan of Care  Goal: Plan of Care Review  2/28/2024 1803 by Regina Carnes RN  Outcome: Ongoing, Progressing  2/28/2024 1439 by Regina Carnes RN  Outcome: Ongoing, Progressing  Goal: Absence of Hospital-Acquired Illness or Injury  Outcome: Ongoing, Progressing  Goal: Optimal Comfort and Wellbeing  Outcome: Ongoing, Progressing     Problem: Device-Related Complication Risk (Hemodialysis)  Goal: Safe, Effective Therapy Delivery  Outcome: Ongoing, Progressing     Problem: Hemodynamic Instability (Hemodialysis)  Goal: Effective Tissue Perfusion  2/28/2024 1441 by Regina Carnes RN  Outcome: Ongoing, Progressing  2/28/2024 1439 by Regina Carnes RN  Outcome: Ongoing, Progressing     Problem: Infection (Hemodialysis)  Goal: Absence of Infection Signs and Symptoms  2/28/2024 1803 by Regina Carnes RN  Outcome: Ongoing, Progressing  2/28/2024 1439 by Regina Carnes RN  Outcome: Ongoing, Progressing     Problem: Embolism (Cardiac Catheterization)  Goal: Absence of Embolism Signs and Symptoms  Outcome: Ongoing, Progressing     Problem: Pain (Cardiac Catheterization)  Goal: Acceptable Pain Control  Outcome: Ongoing, Progressing     Problem: Vascular Access Protection (Cardiac Catheterization)  Goal: Absence of Vascular Access Complication  Outcome: Ongoing, Progressing       AAOX4,VSS,O2 sats > 95% on RA. Plan of care discussed with patient. Patient has no complaints of chest pain/SOB/palpitations. Pt fall precautions in place,no falls/injuries through the shift.Discussed medications and care.Patient has no questions at this time.Pt resting comfortably with no acute distress.Call light within reach,bed at lowest position.

## 2024-02-29 NOTE — ASSESSMENT & PLAN NOTE
- Not in exacerbation, volume overload likely due to ESRD  - TTE reviewed- EF 60-65%- gr2 DD, CVP 8mmHg- 02/26/24  - HD as per schedule- T/Thu/Sat  - appears euvolemic

## 2024-02-29 NOTE — ASSESSMENT & PLAN NOTE
Patient with Paroxysmal (<7 days) atrial fibrillation which is controlled currently with Beta Blocker and Amiodarone. Patient is currently in sinus rhythm.[id number] Score: 2. HASBLED Score: . Anticoagulation indicated. Anticoagulation done with eliquis .

## 2024-02-29 NOTE — PROGRESS NOTES
[facility] - Cardiology [facility] Medicine  Progress Note    Patient Name: Ibrahima Phelps  MRN: [id number]  Patient Class: IP- Inpatient   Admission Date: 2/23/2024  Length of Stay: 6 days  Attending Physician: Matilde Bonilla MD  Primary Care Provider: Anatoliy Colindres MD        Subjective:     Principal Problem:NSTEMI (non-ST elevated myocardial infarction)        HPI:  Mr. Phelps is a a 69 yo male with a PMHx of paroxysmal atrial fibrillation, tachy-loan syndrome, HFpEF with most recent LVEF 65%, CAD s/p PCI 2006, HTN, HLD, aortic atherosclerosis, secondary hyperparathyroidism, diverticulosis, COPD, GERD, hypothyroidism, gout, a history of tobacco use, obesity, ESRD s/p kidney transplant x2 (1992, 2005) on chronic immunosuppressive medication who presents with chest pain.      Recent admission and discharge 2/21/24. Syncope after vascular procedure. No syncopal episodes or events on telemetry were noted after admission. IV Lasix was given noting BNP > 2000 and overload on exam.     In the ED his HR was in 60-70s, -180s. Plts 121. K 2.9, Cr 7.8. Trop .08. BNP 1929. LA 1.71. CXr with pulmonary edema and pleural effusion on the right. ECF with AF with RVR with ST depressions in inferior leads and non-specific changes. Pt complaining of crushing chest pain which started this AM around 3 and continued until he got here. PT given Nitro in ED x3 and morphine twice. Pt noted chest pain is improved on my evaluation and he is chest pain free.     Overview/Hospital Course:  History of ESRD status post renal transplant x2 initially presented for chest pain, NSTEMI and on ACS protocol.  Admitted to CCU, LHC was initially planned however pt was noted to not be fully aware of procedural risks and in addition due to renal dysfunction therefore LHC was aborted.  Eventually done 2/26.  Found to have multivessel disease.  CT surgery consulted, deemed to not be surgical candidate. Now going for planned multivessel  PCI on 2/28. Had been hypervolemic on exam, likely requiring dialysis. CXR with pulmonary edema and increased size of small right pleural effusion. Fistula had previously created on 12/2023 with plan for HD in the future.  Was recently discharged on 02/21, HD recommended by KTM team however pt had declined. KTM currently on board, pt now agreeable for dialysis. First session was on 2/24/24. Patient underwent successful PCI to RCA w/2 DIEGO 02/28. He was awaiting outpatient HD chair- [place] w/chair, but unable to complete admission process until Tuesday, 03/05, for bed- patient on T/Thr/Sat schedule- planning for HD this Saturday, then dc afterwards w/planned initiation of outpatient HD 03/05.       Interval History:  Seen in HD unit- doing well- no new complaints- discussed how outpatient HD chair not available until this Tuesday, 03/05- will plan for HD this Saturday, then DC afterwards. No nausea, vomiting, fevers, chills, night sweats, abd pain,chest pain, SOB.     Objective:     Vital Signs (Most Recent):  Temp: 98 °F (36.7 °C) (02/26/24 1240)  Pulse: 99 (02/26/24 1240)  Resp: 18 (02/26/24 1240)  BP: 131/64 (02/26/24 1240)  SpO2: 98 % (02/26/24 1240) Vital Signs (24h Range):  Temp:  [97.6 °F (36.4 °C)-98.2 °F (36.8 °C)] 98 °F (36.7 °C)  Pulse:  [] 99  Resp:  [18-20] 18  SpO2:  [91 %-98 %] 98 %  BP: (120-139)/(57-75) 131/64     Weight: 88 kg (194 lb)  Body mass index is 25.6 kg/m².    Intake/Output Summary (Last 24 hours) at 2/26/2024 1248  Last data filed at 2/25/2024 1752  Gross per 24 hour   Intake 100 ml   Output --   Net 100 ml        Physical Exam  Gen: in NAD, appears stated age  Neuro: AAOx3, motor, sensory, and strength grossly intact BL  HEENT: NTNC, EOMI, PERRL, MMM  CVS: RRR, no m/r/g; S1/S2 auscultated with no S3 or S4; capillary refill < 2 sec, AVF of the LUE  Resp: lungs CTAB, no w/r/r; no belabored breathing or accessory muscle use appreciated   Abd: BS+ in all 4 quadrants; NTND, soft to  palpation; no organomegaly appreciated   Extrem: pulses full, equal, and regular over all 4 extremities; no UE or LE edema BL- loose skin of BL LE      Significant Labs: All pertinent labs within the past 24 hours have been reviewed.  CBC:   Recent Labs   Lab 02/28/24  0749 02/29/24  0932   WBC 9.77 7.51   HGB 7.8* 8.2*   HCT 24.5* 25.1*   * 145*     CMP:   Recent Labs   Lab 02/28/24  0749 02/29/24  0453    140   K 3.7 4.4    105   CO2 24 23   GLU 97 138*   BUN 54* 61*   CREATININE 6.3* 6.8*   CALCIUM 7.8* 7.5*   ANIONGAP 12 12       Significant Imaging: I have reviewed all pertinent imaging results/findings within the past 24 hours.    Assessment/Plan:      * NSTEMI (non-ST elevated myocardial infarction)  - LHC done 2/26:  MV disease involving LAD, Mrg, notably 95% pRCA occlusion  - CTS consulted- not a surgical candidate  - underwent PCI w/2x DIEGO to RCA 02/28- no chest pain, doing well  - continue asa, plavix, eliquis x1mo- then plavix and eliquis for life    Anemia of chronic kidney failure, stage 5  Patient's anemia is currently controlled. Has received 1 units of PRBCs on 2/26 . Etiology likely d/t chronic disease due to Chronic Kidney Disease/ESRD  Current CBC reviewed-   Lab Results   Component Value Date    HGB 8.2 (L) 02/29/2024    HCT 25.1 (L) 02/29/2024     Monitor serial CBC and transfuse if patient becomes hemodynamically unstable, symptomatic or H/H drops below 7/21.    Phosphorus metabolism disorder  - Sevelamer      Failed kidney transplant  - see h/o kidney transplant      Acute on chronic diastolic heart failure  - Status post IV diuresis, now euvolemic  - TTE 2/26 with preserved EF      ESRD (end stage renal disease)  Creatine stable for now. BMP reviewed- noted Estimated Creatinine Clearance: 11.3 mL/min (A) (based on SCr of 7 mg/dL (H)). according to latest data. Based on current GFR, CKD stage is stage 5 - GFR < 15.  Monitor UOP and serial BMP and adjust therapy as needed.  "Renally dose meds. Avoid nephrotoxic medications and procedures.  - Fistula recently created 12/18/23 (unresponsive during recent outpatient fistulogram leading to previous hospitalization)  - Volume overloaded on admission- renal failure worsening. Previously ESRD s/p transplant x2, now progressed back to ESRD  - 1st session of HD 02/24  - Planned T/Th/Sat schedule- chair at [place]- not available until Tuesday, 03/05/24  - HD today- next session this Saturday- then can plan to dc after    Chronic heart failure with preserved ejection fraction  - Not in exacerbation, volume overload likely due to ESRD  - TTE reviewed- EF 60-65%- gr2 DD, CVP 8mmHg- 02/26/24  - HD as per schedule- T/Thu/Sat  - appears euvolemic       Immunocompromised state due to drug therapy  - see h/o kidney transplant      Paroxysmal atrial fibrillation  Patient with Paroxysmal (<7 days) atrial fibrillation which is controlled currently with Beta Blocker and Amiodarone. Patient is currently in sinus rhythm.[id number] Score: 2. HASBLED Score: . Anticoagulation indicated. Anticoagulation done with eliquis .    Troponin level elevated        H/O kidney transplant  - Failed renal transplant, now ESRD  - KTM following- Would need tapering of Prograf (3mg BID --> 2 months, 2mg BID --> 2 months, 1mg BID 2 months and then at last 1mg QD for 1 month)--> "Will keep on 3mg BID prograf for now for 2 months"      Mixed hyperlipidemia  - ASA, statin      Primary hypertension  Chronic, controlled. Latest blood pressure and vitals reviewed-     Temp:  [97.4 °F (36.3 °C)-98.8 °F (37.1 °C)]   Pulse:  [51-72]   Resp:  [18]   BP: (118-155)/(58-74)   SpO2:  [94 %-100 %] .   Home meds for hypertension were reviewed and noted below.   Hypertension Medications               doxazosin (CARDURA) 4 MG tablet Take 1 tablet (4 mg total) by mouth every 12 (twelve) hours.    furosemide (LASIX) 80 MG tablet Take 1 tablet (80 mg total) by mouth 2 (two) times a day.    " hydrALAZINE (APRESOLINE) 50 MG tablet TAKE 1 TABLET (50 MG TOTAL) BY MOUTH 3 (THREE) TIMES DAILY.    isosorbide mononitrate (IMDUR) 30 MG 24 hr tablet Take 4 tablets (120 mg total) by mouth once daily.    metoprolol succinate (TOPROL-XL) 25 MG 24 hr tablet Take 0.5 tablets (12.5 mg total) by mouth once daily.    NIFEdipine (PROCARDIA-XL) 60 MG (OSM) 24 hr tablet Take 1 tablet (60 mg total) by mouth 2 (two) times a day.    nitroGLYCERIN (NITROSTAT) 0.4 MG SL tablet Place 1 tablet (0.4 mg total) under the tongue every 5 (five) minutes as needed for Chest pain.            While in the hospital, will manage blood pressure as follows; Continue home antihypertensive regimen    Will utilize p.r.n. blood pressure medication only if patient's blood pressure greater than 180/110 and he develops symptoms such as worsening chest pain or shortness of breath.      VTE Risk Mitigation (From admission, onward)           Ordered     apixaban tablet 5 mg  2 times daily         02/28/24 1556     IP VTE HIGH RISK PATIENT  Once         02/23/24 1122     Place sequential compression device  Until discontinued         02/23/24 1122                    Discharge Planning   RAMU: 3/2/2024     Code Status: Full Code   Is the patient medically ready for discharge?: No    Reason for patient still in hospital (select all that apply): Patient trending condition  Discharge Plan A: Home with family                    Matilde Bonilla MD  Department of Hospital Medicine   [facility] - Cardiology Stepdown

## 2024-02-29 NOTE — PROGRESS NOTES
Dialysis completed. Needles removed from MIRA fistula with manual pressure held to sites for 7 minutes each with hemostasis achieved. Gauze dressing applied. Dialyzed for 3 hours with fluid removal of 1 liter. Tolerated well with stable vital signs. Returned to his room by wheelchair.

## 2024-02-29 NOTE — PROGRESS NOTES
Patient arrived to the acute dialysis unit by wheelchair. Acute dialysis(KTM) started via 16 gauge fistula needles x 2 without difficulty.

## 2024-02-29 NOTE — PROGRESS NOTES
.Name: Ibrahima Phelps  Medical Record Number: [id number]  Date of Service: 02/29/2024  Note By: Cece James MD    RENAL TRANSPLANT PROGRESS NOTE      Reason for Follow-up: Reassessment of kidney transplant recipient and management of immunosuppression.    Interval History: Had cardiac procedure yesterday with stents placement, electrolytes are stable, rising Scr/BUN, will do dialysis today.  Will keep him on 3mg FK BID and Pred 5mg.    PMHx:  Past Medical History:   Diagnosis Date    Atrial fibrillation     CAD (coronary artery disease) 2006    MI in 2006    CHF (congestive heart failure) 2017    CKD (chronic kidney disease) stage 3, GFR 30-59 ml/min     Dr. Latif.  in transplanted kidney    CKD (chronic kidney disease) stage 5, GFR less than 15 ml/min 02/02/2024    COVID-19 02/07/2023    Diverticulosis     Hyperlipidemia     Hypertension     Keloid cicatrix     Metabolic bone disease     Other emphysema 10/01/2019    Pericarditis     S/P kidney transplant 1992    x2 (1992 & 2005),    Thrombocytopenia     Thyroid disease     Tobacco use 09/05/2014       Medications:   Scheduled Meds:   sodium chloride 0.9%   Intravenous Once    amiodarone  200 mg Oral Daily    apixaban  5 mg Oral BID    aspirin  81 mg Oral Daily    atorvastatin  80 mg Oral Daily    clopidogreL  75 mg Oral Daily    diphenhydrAMINE  50 mg Oral Q6H    doxazosin  4 mg Oral Q12H    famotidine  20 mg Oral Daily    guaiFENesin  600 mg Oral BID    hydrALAZINE  50 mg Oral Q8H    metoprolol succinate  12.5 mg Oral Daily    multivitamin  1 tablet Oral Daily    NIFEdipine  60 mg Oral BID    predniSONE  5 mg Oral Daily    predniSONE  50 mg Oral Q6H    sevelamer carbonate  800 mg Oral TID WM    tacrolimus  3 mg Oral BID    tiotropium bromide  2 puff Inhalation Daily       Continuous Infusions:    PRN Meds:.0.9%  NaCl infusion (for blood administration), sodium chloride 0.9%, sodium chloride 0.9%, acetaminophen, acetaminophen, albuterol-ipratropium, gabapentin,  hydrALAZINE, HYDROcodone-acetaminophen, melatonin, nitroGLYCERIN, ondansetron, predniSONE, sodium chloride 0.9%, sodium chloride 0.9%, sodium chloride 0.9%, sodium chloride 0.9%    Review of Systems:   10 point review of systems was conducted and was negative except as mentioned in the HPI.    Physical Exam:  Vitals:  Vitals:    02/29/24 1149   BP:    Pulse: 68   Resp:    Temp:        Temp:  [97.4 °F (36.3 °C)-98.8 °F (37.1 °C)] 97.6 °F (36.4 °C)  Pulse:  [51-72] 68  Resp:  [18] 18  SpO2:  [94 %-100 %] 96 %  BP: (118-155)/(58-74) 137/71    I/Os:  Uop 250ml    Exam  General: No acute distress, well groomed, alert and oriented x 3  HEENT: Normocephalic, atraumatic, EOM's intact bilaterally, external inspection of ears and nose normal, moist mucous membranes, no oral ulcerations/lesions  Neck: Supple, symmetrical, trachea midline, no thyromegaly, no JVD  Respiratory: Clear to auscultation bilaterally, respirations unlabored, no rales/rhonchi/wheezing  Cardiovacular: Regular rate and rhythm, S1, S2 normal, no murmurs, rubs or gallops  Gastrointestinal: Soft, non-tender, bowel sounds normal, no hepatosplenomegaly  Renal allograft exam: No tenderness, no bruits, normal exam  Musculoskeletal: No knee or ankle joint tenderness or swelling.   Extremities: No clubbing or cyanosis of bilateral upper extremities; no lower extremity edema bilaterally, radial pulses 2+ bilaterally, symmetric  Skin: warm and dry; no rash on exposed skin  Neurologic: CN grossly intact    Labs:  Lab Results   Component Value Date    WBC 7.51 02/29/2024    HGB 8.2 (L) 02/29/2024    HCT 25.1 (L) 02/29/2024     02/29/2024    K 4.4 02/29/2024     02/29/2024    CO2 23 02/29/2024    BUN 61 (H) 02/29/2024    CREATININE 6.8 (H) 02/29/2024    EGFRNONAA 22.3 (A) 07/30/2022    GLUCOSE 121 (H) 04/12/2005    CALCIUM 7.5 (L) 02/29/2024    PHOS 3.6 02/29/2024    MG 1.9 02/29/2024    ALBUMIN 2.9 (L) 02/25/2024    AST 7 (L) 02/23/2024    ALT 19  02/23/2024     Assessment/Plan:    Post Transplant CKD stage 5, GFR < 15, on Dialysis;  S/P KT  CKD V with baseline Scr 2.8-3.2, eGFR < 15, dialysis dependent  CXR; pulmonary infiltrates with edema picture, having SOB on exertion, BNP 1900  LUE AVG functional according to Vasular surgeon, good bruit and palpable thrill on examination  After discussing multiple times the need for dialysis and multiple refusals from the patient, now consented for dialysis and today would be receiving second session.     Cardiac stent placement 02/28     Plan;  -HD session today, yesterday was unable to receive it  -I/Os  -Daily CBC, BMP, 12h FK levels     Anemia;  HgB 8.2 today          H/O kidney transplant  S/P 2 KT 1992 and 2005  Home immunosuppresants; Tacro 3mg BID and Pred 5mg QD  Baseline Scr 2.8-3.2, CKD V  Would need tapering of Prograf (3mg BID --> 2 months, 2mg BID --> 2 months, 1mg BID 2 months and then at last 1mg QD for 1 month)  Will keep on 3mg BID prograf for now for 2 months            CAD (coronary artery disease)  CAD s/p CABG 2006  NSTEMI 02/23/23   Elevated Trops 13 on 02/25, 10 on 02/26, tripple vessel disease, not fit for CABG per interventional cardiology  stent placement 02/28     Electrolytes;  Non emergent          Acidosis;       HCO3 23       On NaHCO3 1300mg BID         Hypertension;      On Hydralazine 50mg TID, Nifedipine 60 QD            Cece James MD  Renal Transplant Fellow

## 2024-03-01 ENCOUNTER — TELEPHONE (OUTPATIENT)
Dept: NEPHROLOGY | Facility: CLINIC | Age: 69
End: 2024-03-01
Payer: MEDICARE

## 2024-03-01 LAB
ABO + RH BLD: NORMAL
ANION GAP SERPL CALC-SCNC: 11 MMOL/L (ref 8–16)
BASOPHILS # BLD AUTO: 0 K/UL (ref 0–0.2)
BASOPHILS NFR BLD: 0 % (ref 0–1.9)
BLD GP AB SCN CELLS X3 SERPL QL: NORMAL
BUN SERPL-MCNC: 47 MG/DL (ref 8–23)
CALCIUM SERPL-MCNC: 8.2 MG/DL (ref 8.7–10.5)
CHLORIDE SERPL-SCNC: 103 MMOL/L (ref 95–110)
CO2 SERPL-SCNC: 25 MMOL/L (ref 23–29)
CREAT SERPL-MCNC: 5.6 MG/DL (ref 0.5–1.4)
DIFFERENTIAL METHOD BLD: ABNORMAL
EOSINOPHIL # BLD AUTO: 0 K/UL (ref 0–0.5)
EOSINOPHIL NFR BLD: 0 % (ref 0–8)
ERYTHROCYTE [DISTWIDTH] IN BLOOD BY AUTOMATED COUNT: 17.7 % (ref 11.5–14.5)
EST. GFR  (NO RACE VARIABLE): 10.3 ML/MIN/1.73 M^2
GLUCOSE SERPL-MCNC: 109 MG/DL (ref 70–110)
HCT VFR BLD AUTO: 26.1 % (ref 40–54)
HGB BLD-MCNC: 8.2 G/DL (ref 14–18)
IMM GRANULOCYTES # BLD AUTO: 0.06 K/UL (ref 0–0.04)
IMM GRANULOCYTES NFR BLD AUTO: 0.5 % (ref 0–0.5)
LYMPHOCYTES # BLD AUTO: 0.8 K/UL (ref 1–4.8)
LYMPHOCYTES NFR BLD: 6.9 % (ref 18–48)
MAGNESIUM SERPL-MCNC: 1.9 MG/DL (ref 1.6–2.6)
MCH RBC QN AUTO: 26.2 PG (ref 27–31)
MCHC RBC AUTO-ENTMCNC: 31.4 G/DL (ref 32–36)
MCV RBC AUTO: 83 FL (ref 82–98)
MONOCYTES # BLD AUTO: 0.9 K/UL (ref 0.3–1)
MONOCYTES NFR BLD: 7 % (ref 4–15)
NEUTROPHILS # BLD AUTO: 10.5 K/UL (ref 1.8–7.7)
NEUTROPHILS NFR BLD: 85.6 % (ref 38–73)
NRBC BLD-RTO: 0 /100 WBC
PHOSPHATE SERPL-MCNC: 3.4 MG/DL (ref 2.7–4.5)
PLATELET # BLD AUTO: 167 K/UL (ref 150–450)
PMV BLD AUTO: 12.7 FL (ref 9.2–12.9)
POCT GLUCOSE: 120 MG/DL (ref 70–110)
POCT GLUCOSE: 138 MG/DL (ref 70–110)
POCT GLUCOSE: 142 MG/DL (ref 70–110)
POCT GLUCOSE: 156 MG/DL (ref 70–110)
POTASSIUM SERPL-SCNC: 4.1 MMOL/L (ref 3.5–5.1)
RBC # BLD AUTO: 3.13 M/UL (ref 4.6–6.2)
SODIUM SERPL-SCNC: 139 MMOL/L (ref 136–145)
SPECIMEN OUTDATE: NORMAL
WBC # BLD AUTO: 12.24 K/UL (ref 3.9–12.7)

## 2024-03-01 PROCEDURE — 85025 COMPLETE CBC W/AUTO DIFF WBC: CPT | Performed by: STUDENT IN AN ORGANIZED HEALTH CARE EDUCATION/TRAINING PROGRAM

## 2024-03-01 PROCEDURE — 25000003 PHARM REV CODE 250

## 2024-03-01 PROCEDURE — 84100 ASSAY OF PHOSPHORUS: CPT | Performed by: STUDENT IN AN ORGANIZED HEALTH CARE EDUCATION/TRAINING PROGRAM

## 2024-03-01 PROCEDURE — 80048 BASIC METABOLIC PNL TOTAL CA: CPT | Performed by: STUDENT IN AN ORGANIZED HEALTH CARE EDUCATION/TRAINING PROGRAM

## 2024-03-01 PROCEDURE — 25000003 PHARM REV CODE 250: Performed by: STUDENT IN AN ORGANIZED HEALTH CARE EDUCATION/TRAINING PROGRAM

## 2024-03-01 PROCEDURE — 36415 COLL VENOUS BLD VENIPUNCTURE: CPT | Performed by: STUDENT IN AN ORGANIZED HEALTH CARE EDUCATION/TRAINING PROGRAM

## 2024-03-01 PROCEDURE — 94640 AIRWAY INHALATION TREATMENT: CPT

## 2024-03-01 PROCEDURE — 83735 ASSAY OF MAGNESIUM: CPT | Performed by: STUDENT IN AN ORGANIZED HEALTH CARE EDUCATION/TRAINING PROGRAM

## 2024-03-01 PROCEDURE — 99233 SBSQ HOSP IP/OBS HIGH 50: CPT | Mod: GC,,, | Performed by: INTERNAL MEDICINE

## 2024-03-01 PROCEDURE — 86850 RBC ANTIBODY SCREEN: CPT | Performed by: STUDENT IN AN ORGANIZED HEALTH CARE EDUCATION/TRAINING PROGRAM

## 2024-03-01 PROCEDURE — 20600001 HC STEP DOWN PRIVATE ROOM

## 2024-03-01 PROCEDURE — 94761 N-INVAS EAR/PLS OXIMETRY MLT: CPT

## 2024-03-01 PROCEDURE — 63600175 PHARM REV CODE 636 W HCPCS

## 2024-03-01 RX ORDER — SODIUM CHLORIDE 9 MG/ML
INJECTION, SOLUTION INTRAVENOUS ONCE
Status: CANCELLED | OUTPATIENT
Start: 2024-03-02 | End: 2024-03-02

## 2024-03-01 RX ADMIN — TACROLIMUS 3 MG: 1 CAPSULE ORAL at 05:03

## 2024-03-01 RX ADMIN — SEVELAMER CARBONATE 800 MG: 800 TABLET, FILM COATED ORAL at 09:03

## 2024-03-01 RX ADMIN — ATORVASTATIN CALCIUM 80 MG: 20 TABLET, FILM COATED ORAL at 08:03

## 2024-03-01 RX ADMIN — DOXAZOSIN 4 MG: 2 TABLET ORAL at 08:03

## 2024-03-01 RX ADMIN — NIFEDIPINE 60 MG: 60 TABLET, FILM COATED, EXTENDED RELEASE ORAL at 08:03

## 2024-03-01 RX ADMIN — THERA TABS 1 TABLET: TAB at 08:03

## 2024-03-01 RX ADMIN — CLOPIDOGREL BISULFATE 75 MG: 75 TABLET ORAL at 09:03

## 2024-03-01 RX ADMIN — HYDRALAZINE HYDROCHLORIDE 50 MG: 50 TABLET ORAL at 08:03

## 2024-03-01 RX ADMIN — ASPIRIN 81 MG: 81 TABLET, COATED ORAL at 09:03

## 2024-03-01 RX ADMIN — APIXABAN 5 MG: 5 TABLET, FILM COATED ORAL at 09:03

## 2024-03-01 RX ADMIN — METOPROLOL SUCCINATE 12.5 MG: 25 TABLET, EXTENDED RELEASE ORAL at 08:03

## 2024-03-01 RX ADMIN — SEVELAMER CARBONATE 800 MG: 800 TABLET, FILM COATED ORAL at 04:03

## 2024-03-01 RX ADMIN — HYDRALAZINE HYDROCHLORIDE 50 MG: 50 TABLET ORAL at 02:03

## 2024-03-01 RX ADMIN — GUAIFENESIN 600 MG: 600 TABLET, EXTENDED RELEASE ORAL at 09:03

## 2024-03-01 RX ADMIN — SEVELAMER CARBONATE 800 MG: 800 TABLET, FILM COATED ORAL at 02:03

## 2024-03-01 RX ADMIN — TIOTROPIUM BROMIDE INHALATION SPRAY 2 PUFF: 1.56 SPRAY, METERED RESPIRATORY (INHALATION) at 08:03

## 2024-03-01 RX ADMIN — PREDNISONE 5 MG: 2.5 TABLET ORAL at 08:03

## 2024-03-01 RX ADMIN — HYDRALAZINE HYDROCHLORIDE 50 MG: 50 TABLET ORAL at 05:03

## 2024-03-01 RX ADMIN — TACROLIMUS 3 MG: 1 CAPSULE ORAL at 08:03

## 2024-03-01 RX ADMIN — GUAIFENESIN 600 MG: 600 TABLET, EXTENDED RELEASE ORAL at 08:03

## 2024-03-01 RX ADMIN — APIXABAN 5 MG: 5 TABLET, FILM COATED ORAL at 08:03

## 2024-03-01 RX ADMIN — FAMOTIDINE 20 MG: 20 TABLET ORAL at 08:03

## 2024-03-01 RX ADMIN — AMIODARONE HYDROCHLORIDE 200 MG: 200 TABLET ORAL at 09:03

## 2024-03-01 NOTE — PROGRESS NOTES
.Name: Ibrahima Phelps  Medical Record Number: [id number]  Date of Service: 03/01/2024  Note By: Cece James MD    RENAL TRANSPLANT PROGRESS NOTE      Reason for Follow-up: Reassessment of kidney transplant recipient and management of immunosuppression.    Interval History: No acute events overnight, planned HD for tomorrow, dialysis unit already informed, vitally stable, HD chair arranged at [facility] for TTS schedule.  Will keep him on current dose of prograf with tapering gradually as written in the assessment section.    PMHx:  Past Medical History:   Diagnosis Date    Atrial fibrillation     CAD (coronary artery disease) 2006    MI in 2006    CHF (congestive heart failure) 2017    CKD (chronic kidney disease) stage 3, GFR 30-59 ml/min     Dr. Latif.  in transplanted kidney    CKD (chronic kidney disease) stage 5, GFR less than 15 ml/min 02/02/2024    COVID-19 02/07/2023    Diverticulosis     Hyperlipidemia     Hypertension     Keloid cicatrix     Metabolic bone disease     Other emphysema 10/01/2019    Pericarditis     S/P kidney transplant 1992    x2 (1992 & 2005),    Thrombocytopenia     Thyroid disease     Tobacco use 09/05/2014       Medications:   Scheduled Meds:   sodium chloride 0.9%   Intravenous Once    amiodarone  200 mg Oral Daily    apixaban  5 mg Oral BID    aspirin  81 mg Oral Daily    atorvastatin  80 mg Oral Daily    clopidogreL  75 mg Oral Daily    doxazosin  4 mg Oral Q12H    famotidine  20 mg Oral Daily    guaiFENesin  600 mg Oral BID    hydrALAZINE  50 mg Oral Q8H    metoprolol succinate  12.5 mg Oral Daily    multivitamin  1 tablet Oral Daily    NIFEdipine  60 mg Oral BID    predniSONE  5 mg Oral Daily    sevelamer carbonate  800 mg Oral TID WM    tacrolimus  3 mg Oral BID    tiotropium bromide  2 puff Inhalation Daily       Continuous Infusions:    PRN Meds:.sodium chloride 0.9%, acetaminophen, albuterol-ipratropium, gabapentin, hydrALAZINE, HYDROcodone-acetaminophen, melatonin,  nitroGLYCERIN, ondansetron, sodium chloride 0.9%, sodium chloride 0.9%, sodium chloride 0.9%    Review of Systems:   10 point review of systems was conducted and was negative except as mentioned in the HPI.    Physical Exam:  Vitals:  Vitals:    03/01/24 1200   BP:    Pulse: 63   Resp:    Temp:        Temp:  [97.4 °F (36.3 °C)-98.2 °F (36.8 °C)] 97.4 °F (36.3 °C)  Pulse:  [52-80] 63  Resp:  [17-20] 18  SpO2:  [94 %-98 %] 97 %  BP: (118-130)/(42-65) 122/42    I/Os:  UOP; 200    Exam  General: No acute distress, well groomed, alert and oriented x 3  HEENT: Normocephalic, atraumatic, EOM's intact bilaterally, external inspection of ears and nose normal, moist mucous membranes, no oral ulcerations/lesions  Neck: Supple, symmetrical, trachea midline, no thyromegaly, no JVD  Respiratory: Clear to auscultation bilaterally, respirations unlabored, no rales/rhonchi/wheezing  Cardiovacular: Regular rate and rhythm, S1, S2 normal, no murmurs, rubs or gallops  Gastrointestinal: Soft, non-tender, bowel sounds normal, no hepatosplenomegaly  Renal allograft exam: No tenderness, no bruits, normal exam  Musculoskeletal: No knee or ankle joint tenderness or swelling.   Extremities: No clubbing or cyanosis of bilateral upper extremities; no lower extremity edema bilaterally, radial pulses 2+ bilaterally, symmetric  Skin: warm and dry; no rash on exposed skin  Neurologic: CN grossly intact    Labs:  Lab Results   Component Value Date    WBC 12.24 03/01/2024    HGB 8.2 (L) 03/01/2024    HCT 26.1 (L) 03/01/2024     03/01/2024    K 4.1 03/01/2024     03/01/2024    CO2 25 03/01/2024    BUN 47 (H) 03/01/2024    CREATININE 5.6 (H) 03/01/2024    EGFRNONAA 22.3 (A) 07/30/2022    GLUCOSE 121 (H) 04/12/2005    CALCIUM 8.2 (L) 03/01/2024    PHOS 3.4 03/01/2024    MG 1.9 03/01/2024    ALBUMIN 2.9 (L) 02/25/2024    AST 7 (L) 02/23/2024    ALT 19 02/23/2024     ?    Assessment/Plan:    Post Transplant CKD stage 5, GFR < 15, on  Dialysis;  S/P KT  CKD V with baseline Scr 2.8-3.2, eGFR < 15, dialysis dependent  CXR; pulmonary infiltrates with edema picture, having SOB on exertion, BNP 1900  LUE AVG functional according to Vasular surgeon, good bruit and palpable thrill on examination  After discussing multiple times the need for dialysis and multiple refusals from the patient, now consented for dialysis and today would be receiving second session.     Cardiac stent placement 02/28     Plan;  -HD session tomorrow  -I/Os  -Daily CBC, BMP, 12h FK levels     Anemia;  HgB 8.2 today          H/O kidney transplant  S/P 2 KT 1992 and 2005  Home immunosuppresants; Tacro 3mg BID and Pred 5mg QD  Baseline Scr 2.8-3.2, CKD V  Plan;  Would need tapering of Prograf (3mg BID --> 2 months, 2mg BID --> 2 months, 1mg BID 2 months and then at last 1mg QD for 1 month)  Will keep on 3mg BID prograf for now for 2 months            CAD (coronary artery disease)  CAD s/p CABG 2006  NSTEMI 02/23/23   Elevated Trops 13 on 02/25, 10 on 02/26, tripple vessel disease, not fit for CABG per interventional cardiology  stent placement 02/28     Electrolytes;  Non emergent          Acidosis;       HCO3 23       On NaHCO3 1300mg BID         Hypertension;      On Hydralazine 50mg TID, Nifedipine 60 QD        Cece James MD  Renal Transplant Fellow

## 2024-03-01 NOTE — ASSESSMENT & PLAN NOTE
Creatine stable for now. BMP reviewed- noted Estimated Creatinine Clearance: 14.1 mL/min (A) (based on SCr of 5.6 mg/dL (H)). according to latest data. Based on current GFR, CKD stage is stage 5 - GFR < 15.  Monitor UOP and serial BMP and adjust therapy as needed. Renally dose meds. Avoid nephrotoxic medications and procedures.  - Fistula recently created 12/18/23 (unresponsive during recent outpatient fistulogram leading to previous hospitalization)  - Volume overloaded on admission- renal failure worsening. Previously ESRD s/p transplant x2, now progressed back to ESRD  - 1st session of HD 02/24  - Planned T/Th/Sat schedule- chair at [place]- not available until Tuesday, 03/05/24  - HD yesterday- next session this Saturday- then can plan to dc after

## 2024-03-01 NOTE — PLAN OF CARE
CHW met with patient/family at bedside. Patient experience rounding completed and reviewed the following.     Do you know your discharge plan? Yes or No,    If yes, what is the plan? (Home, Home Health, Rehab, SNF, LTAC, or Other) Yes Home    Have you discussed your needs and preferences with your SW/CM? Yes or No  Yes Home    If you are discharging home, do you have help at home? Yes or No Yes (spouse)    Do you think you will need help additional at home at discharge? Yes or No  No    Do you currently have difficulty keeping up with bills, affording medicine or buying food? Yes or No No    Assigned SW/CM notified of any patient/family needs or concerns. Appropriate resources provided to address patient's needs.          Ashely Schaefer CHW  Case Management   [id number]

## 2024-03-01 NOTE — PROGRESS NOTES
[facility] - Cardiology [facility] Medicine  Progress Note    Patient Name: Ibrahima Phelps  MRN: [id number]  Patient Class: IP- Inpatient   Admission Date: 2/23/2024  Length of Stay: 7 days  Attending Physician: Matilde Bonilla MD  Primary Care Provider: Anatoliy Colindres MD        Subjective:     Principal Problem:NSTEMI (non-ST elevated myocardial infarction)        HPI:  Mr. Phelps is a a 69 yo male with a PMHx of paroxysmal atrial fibrillation, tachy-loan syndrome, HFpEF with most recent LVEF 65%, CAD s/p PCI 2006, HTN, HLD, aortic atherosclerosis, secondary hyperparathyroidism, diverticulosis, COPD, GERD, hypothyroidism, gout, a history of tobacco use, obesity, ESRD s/p kidney transplant x2 (1992, 2005) on chronic immunosuppressive medication who presents with chest pain.      Recent admission and discharge 2/21/24. Syncope after vascular procedure. No syncopal episodes or events on telemetry were noted after admission. IV Lasix was given noting BNP > 2000 and overload on exam.     In the ED his HR was in 60-70s, -180s. Plts 121. K 2.9, Cr 7.8. Trop .08. BNP 1929. LA 1.71. CXr with pulmonary edema and pleural effusion on the right. ECF with AF with RVR with ST depressions in inferior leads and non-specific changes. Pt complaining of crushing chest pain which started this AM around 3 and continued until he got here. PT given Nitro in ED x3 and morphine twice. Pt noted chest pain is improved on my evaluation and he is chest pain free.     Overview/Hospital Course:  History of ESRD status post renal transplant x2 initially presented for chest pain, NSTEMI and on ACS protocol.  Admitted to CCU, LHC was initially planned however pt was noted to not be fully aware of procedural risks and in addition due to renal dysfunction therefore LHC was aborted.  Eventually done 2/26.  Found to have multivessel disease.  CT surgery consulted, deemed to not be surgical candidate. Now going for planned multivessel  PCI on 2/28. Had been hypervolemic on exam, likely requiring dialysis. CXR with pulmonary edema and increased size of small right pleural effusion. Fistula had previously created on 12/2023 with plan for HD in the future.  Was recently discharged on 02/21, HD recommended by KTM team however pt had declined. KTM currently on board, pt now agreeable for dialysis. First session was on 2/24/24. Patient underwent successful PCI to RCA w/2 DIEGO 02/28. He was awaiting outpatient HD chair- [place] w/, but unable to complete admission process until Tuesday, 03/05, for bed- patient on T/Thr/Sat schedule- planning for HD this Saturday, then dc afterwards w/planned initiation of outpatient HD 03/05.       Interval History:  No issues overnight. No nausea, vomiting, fevers, chills, night sweats, abd pain,chest pain, SOB.     Objective:     Vital Signs (Most Recent):  Temp: 97.6 °F (36.4 °C) (03/01/24 0800)  Pulse: 80 (03/01/24 1000)  Resp: 20 (03/01/24 0823)  BP: 130/62 (03/01/24 0823)  SpO2: (!) 94 % (03/01/24 0823) Vital Signs (24h Range):  Temp:  [97.5 °F (36.4 °C)-98.2 °F (36.8 °C)] 97.6 °F (36.4 °C)  Pulse:  [52-80] 80  Resp:  [17-20] 20  SpO2:  [94 %-98 %] 94 %  BP: (118-146)/(56-71) 130/62     Weight: 85.5 kg (188 lb 7.9 oz)  Body mass index is 24.87 kg/m².    Intake/Output Summary (Last 24 hours) at 3/1/2024 1118  Last data filed at 3/1/2024 0815  Gross per 24 hour   Intake 780 ml   Output 1800 ml   Net -1020 ml      Physical Exam  Gen: in NAD, appears stated age  Neuro: AAOx3, motor, sensory, and strength grossly intact BL  HEENT: NTNC, EOMI, PERRL, MMM  CVS: RRR, no m/r/g; S1/S2 auscultated with no S3 or S4; capillary refill < 2 sec, AVF of the LUE  Resp: lungs CTAB, no w/r/r; no belabored breathing or accessory muscle use appreciated   Abd: BS+ in all 4 quadrants; NTND, soft to palpation; no organomegaly appreciated   Extrem: pulses full, equal, and regular over all 4 extremities; no UE or LE edema BL- loose  skin of BL LE    Significant Labs: All pertinent labs within the past 24 hours have been reviewed.  CBC:   Recent Labs   Lab 02/29/24  0932 03/01/24  0839   WBC 7.51 12.24   HGB 8.2* 8.2*   HCT 25.1* 26.1*   * 167     CMP:   Recent Labs   Lab 02/29/24  0453 03/01/24  0839    139   K 4.4 4.1    103   CO2 23 25   * 109   BUN 61* 47*   CREATININE 6.8* 5.6*   CALCIUM 7.5* 8.2*   ANIONGAP 12 11       Significant Imaging: I have reviewed all pertinent imaging results/findings within the past 24 hours.    Assessment/Plan:      * NSTEMI (non-ST elevated myocardial infarction)  - LHC done 2/26:  MV disease involving LAD, Mrg, notably 95% pRCA occlusion  - CTS consulted- not a surgical candidate  - underwent PCI w/2x DIEGO to RCA 02/28- no chest pain, doing well  - continue asa, plavix, eliquis x1mo- then plavix and eliquis for life    Anemia of chronic kidney failure, stage 5  Patient's anemia is currently controlled. Has received 1 units of PRBCs on 2/26 . Etiology likely d/t chronic disease due to Chronic Kidney Disease/ESRD  Current CBC reviewed-   Lab Results   Component Value Date    HGB 8.2 (L) 03/01/2024    HCT 26.1 (L) 03/01/2024     Monitor serial CBC and transfuse if patient becomes hemodynamically unstable, symptomatic or H/H drops below 7/21.    Phosphorus metabolism disorder  - Sevelamer      Failed kidney transplant  - see h/o kidney transplant      Acute on chronic diastolic heart failure  - Status post IV diuresis, now euvolemic  - TTE 2/26 with preserved EF      ESRD (end stage renal disease)  Creatine stable for now. BMP reviewed- noted Estimated Creatinine Clearance: 14.1 mL/min (A) (based on SCr of 5.6 mg/dL (H)). according to latest data. Based on current GFR, CKD stage is stage 5 - GFR < 15.  Monitor UOP and serial BMP and adjust therapy as needed. Renally dose meds. Avoid nephrotoxic medications and procedures.  - Fistula recently created 12/18/23 (unresponsive during recent  "outpatient fistulogram leading to previous hospitalization)  - Volume overloaded on admission- renal failure worsening. Previously ESRD s/p transplant x2, now progressed back to ESRD  - 1st session of HD 02/24  - Planned T/Th/Sat schedule- chair at [place]- not available until Tuesday, 03/05/24  - HD yesterday- next session this Saturday- then can plan to dc after    Chronic heart failure with preserved ejection fraction  - Not in exacerbation, volume overload likely due to ESRD  - TTE reviewed- EF 60-65%- gr2 DD, CVP 8mmHg- 02/26/24  - HD as per schedule- T/Thu/Sat  - appears euvolemic       Immunocompromised state due to drug therapy  - see h/o kidney transplant      Paroxysmal atrial fibrillation  Patient with Paroxysmal (<7 days) atrial fibrillation which is controlled currently with Beta Blocker and Amiodarone. Patient is currently in sinus rhythm.[id number] Score: 2. HASBLED Score: . Anticoagulation indicated. Anticoagulation done with eliquis .    Troponin level elevated        H/O kidney transplant  - Failed renal transplant, now ESRD  - KTM following- Would need tapering of Prograf (3mg BID --> 2 months, 2mg BID --> 2 months, 1mg BID 2 months and then at last 1mg QD for 1 month)--> "Will keep on 3mg BID prograf for now for 2 months"      Mixed hyperlipidemia  - ASA, statin      Primary hypertension  Chronic, controlled. Latest blood pressure and vitals reviewed-     Temp:  [97.5 °F (36.4 °C)-98.2 °F (36.8 °C)]   Pulse:  [52-80]   Resp:  [17-20]   BP: (118-146)/(56-71)   SpO2:  [94 %-98 %] .   Home meds for hypertension were reviewed and noted below.   Hypertension Medications               doxazosin (CARDURA) 4 MG tablet Take 1 tablet (4 mg total) by mouth every 12 (twelve) hours.    furosemide (LASIX) 80 MG tablet Take 1 tablet (80 mg total) by mouth 2 (two) times a day.    hydrALAZINE (APRESOLINE) 50 MG tablet TAKE 1 TABLET (50 MG TOTAL) BY MOUTH 3 (THREE) TIMES DAILY.    isosorbide mononitrate " (IMDUR) 30 MG 24 hr tablet Take 4 tablets (120 mg total) by mouth once daily.    metoprolol succinate (TOPROL-XL) 25 MG 24 hr tablet Take 0.5 tablets (12.5 mg total) by mouth once daily.    NIFEdipine (PROCARDIA-XL) 60 MG (OSM) 24 hr tablet Take 1 tablet (60 mg total) by mouth 2 (two) times a day.    nitroGLYCERIN (NITROSTAT) 0.4 MG SL tablet Place 1 tablet (0.4 mg total) under the tongue every 5 (five) minutes as needed for Chest pain.            While in the hospital, will manage blood pressure as follows; Continue home antihypertensive regimen    Will utilize p.r.n. blood pressure medication only if patient's blood pressure greater than 180/110 and he develops symptoms such as worsening chest pain or shortness of breath.      VTE Risk Mitigation (From admission, onward)           Ordered     apixaban tablet 5 mg  2 times daily         02/28/24 1556     IP VTE HIGH RISK PATIENT  Once         02/23/24 1122     Place sequential compression device  Until discontinued         02/23/24 1122                    Discharge Planning   RAMU: 3/2/2024     Code Status: Full Code   Is the patient medically ready for discharge?: No    Reason for patient still in hospital (select all that apply): Patient trending condition  Discharge Plan A: Home with family                    Matilde Bonilla MD  Department of Hospital Medicine   [facility] - Cardiology Stepdown

## 2024-03-01 NOTE — TELEPHONE ENCOUNTER
----- Message from Stella Sewell sent at 3/1/2024 10:56 AM CST -----  Regarding: missed call  Type:  Patient Returning Call    Who Called:Patti /wife  Who Left Message for Patient:unknown  Does the patient know what this is regarding?:no  Would the patient rather a call back or a response via [facility]? Call back  Best Call Back Number: [phone]     Additional Information:

## 2024-03-01 NOTE — PLAN OF CARE
AAOX4,VSS,O2 sats>92% on RA.Plan of care discussed with patient.Patient has no complaints of pain/SOB. Discussed medications and care. Patient has no questions at this time.Pt visualised and stable.Call light within reach.Pt resting,bed at lowest position.Pt's family by the bedside.    Problem: Adult Inpatient Plan of Care  Goal: Plan of Care Review  Outcome: Ongoing, Progressing  Goal: Patient-Specific Goal (Individualized)  Outcome: Ongoing, Progressing  Goal: Absence of Hospital-Acquired Illness or Injury  Outcome: Ongoing, Progressing  Goal: Optimal Comfort and Wellbeing  Outcome: Ongoing, Progressing  Goal: Readiness for Transition of Care  Outcome: Ongoing, Progressing     Problem: Fall Injury Risk  Goal: Absence of Fall and Fall-Related Injury  Outcome: Ongoing, Progressing     Problem: Device-Related Complication Risk (Hemodialysis)  Goal: Safe, Effective Therapy Delivery  Outcome: Ongoing, Progressing     Problem: Hemodynamic Instability (Hemodialysis)  Goal: Effective Tissue Perfusion  Outcome: Ongoing, Progressing     Problem: Infection (Hemodialysis)  Goal: Absence of Infection Signs and Symptoms  Outcome: Ongoing, Progressing     Problem: Arrhythmia/Dysrhythmia (Cardiac Catheterization)  Goal: Stable Heart Rate and Rhythm  Outcome: Ongoing, Progressing     Problem: Bleeding (Cardiac Catheterization)  Goal: Absence of Bleeding  Outcome: Ongoing, Progressing     Problem: Contrast-Induced Injury Risk (Cardiac Catheterization)  Goal: Absence of Contrast-Induced Injury  Outcome: Ongoing, Progressing     Problem: Embolism (Cardiac Catheterization)  Goal: Absence of Embolism Signs and Symptoms  Outcome: Ongoing, Progressing     Problem: Ongoing Anesthesia/Sedation Effects (Cardiac Catheterization)  Goal: Anesthesia/Sedation Recovery  Outcome: Ongoing, Progressing     Problem: Pain (Cardiac Catheterization)  Goal: Acceptable Pain Control  Outcome: Ongoing, Progressing     Problem: Vascular Access Protection  (Cardiac Catheterization)  Goal: Absence of Vascular Access Complication  Outcome: Ongoing, Progressing

## 2024-03-01 NOTE — SUBJECTIVE & OBJECTIVE
Interval History:  No issues overnight. No nausea, vomiting, fevers, chills, night sweats, abd pain,chest pain, SOB.     Objective:     Vital Signs (Most Recent):  Temp: 97.6 °F (36.4 °C) (03/01/24 0800)  Pulse: 80 (03/01/24 1000)  Resp: 20 (03/01/24 0823)  BP: 130/62 (03/01/24 0823)  SpO2: (!) 94 % (03/01/24 0823) Vital Signs (24h Range):  Temp:  [97.5 °F (36.4 °C)-98.2 °F (36.8 °C)] 97.6 °F (36.4 °C)  Pulse:  [52-80] 80  Resp:  [17-20] 20  SpO2:  [94 %-98 %] 94 %  BP: (118-146)/(56-71) 130/62     Weight: 85.5 kg (188 lb 7.9 oz)  Body mass index is 24.87 kg/m².    Intake/Output Summary (Last 24 hours) at 3/1/2024 1118  Last data filed at 3/1/2024 0815  Gross per 24 hour   Intake 780 ml   Output 1800 ml   Net -1020 ml      Physical Exam  Gen: in NAD, appears stated age  Neuro: AAOx3, motor, sensory, and strength grossly intact BL  HEENT: NTNC, EOMI, PERRL, MMM  CVS: RRR, no m/r/g; S1/S2 auscultated with no S3 or S4; capillary refill < 2 sec, AVF of the LUE  Resp: lungs CTAB, no w/r/r; no belabored breathing or accessory muscle use appreciated   Abd: BS+ in all 4 quadrants; NTND, soft to palpation; no organomegaly appreciated   Extrem: pulses full, equal, and regular over all 4 extremities; no UE or LE edema BL- loose skin of BL LE    Significant Labs: All pertinent labs within the past 24 hours have been reviewed.  CBC:   Recent Labs   Lab 02/29/24  0932 03/01/24  0839   WBC 7.51 12.24   HGB 8.2* 8.2*   HCT 25.1* 26.1*   * 167     CMP:   Recent Labs   Lab 02/29/24  0453 03/01/24  0839    139   K 4.4 4.1    103   CO2 23 25   * 109   BUN 61* 47*   CREATININE 6.8* 5.6*   CALCIUM 7.5* 8.2*   ANIONGAP 12 11       Significant Imaging: I have reviewed all pertinent imaging results/findings within the past 24 hours.

## 2024-03-01 NOTE — ASSESSMENT & PLAN NOTE
Patient's anemia is currently controlled. Has received 1 units of PRBCs on 2/26 . Etiology likely d/t chronic disease due to Chronic Kidney Disease/ESRD  Current CBC reviewed-   Lab Results   Component Value Date    HGB 8.2 (L) 03/01/2024    HCT 26.1 (L) 03/01/2024     Monitor serial CBC and transfuse if patient becomes hemodynamically unstable, symptomatic or H/H drops below 7/21.

## 2024-03-01 NOTE — ASSESSMENT & PLAN NOTE
Chronic, controlled. Latest blood pressure and vitals reviewed-     Temp:  [97.5 °F (36.4 °C)-98.2 °F (36.8 °C)]   Pulse:  [52-80]   Resp:  [17-20]   BP: (118-146)/(56-71)   SpO2:  [94 %-98 %] .   Home meds for hypertension were reviewed and noted below.   Hypertension Medications               doxazosin (CARDURA) 4 MG tablet Take 1 tablet (4 mg total) by mouth every 12 (twelve) hours.    furosemide (LASIX) 80 MG tablet Take 1 tablet (80 mg total) by mouth 2 (two) times a day.    hydrALAZINE (APRESOLINE) 50 MG tablet TAKE 1 TABLET (50 MG TOTAL) BY MOUTH 3 (THREE) TIMES DAILY.    isosorbide mononitrate (IMDUR) 30 MG 24 hr tablet Take 4 tablets (120 mg total) by mouth once daily.    metoprolol succinate (TOPROL-XL) 25 MG 24 hr tablet Take 0.5 tablets (12.5 mg total) by mouth once daily.    NIFEdipine (PROCARDIA-XL) 60 MG (OSM) 24 hr tablet Take 1 tablet (60 mg total) by mouth 2 (two) times a day.    nitroGLYCERIN (NITROSTAT) 0.4 MG SL tablet Place 1 tablet (0.4 mg total) under the tongue every 5 (five) minutes as needed for Chest pain.            While in the hospital, will manage blood pressure as follows; Continue home antihypertensive regimen    Will utilize p.r.n. blood pressure medication only if patient's blood pressure greater than 180/110 and he develops symptoms such as worsening chest pain or shortness of breath.

## 2024-03-01 NOTE — ASSESSMENT & PLAN NOTE
Patient with Paroxysmal (<7 days) atrial fibrillation which is controlled currently with Beta Blocker and Amiodarone. Patient is currently in sinus rhythm.[id number] Score: 2. HASBLED Score: . Anticoagulation indicated. Anticoagulation done with eliquis .

## 2024-03-02 VITALS
BODY MASS INDEX: 24.98 KG/M2 | OXYGEN SATURATION: 100 % | SYSTOLIC BLOOD PRESSURE: 138 MMHG | DIASTOLIC BLOOD PRESSURE: 66 MMHG | HEART RATE: 64 BPM | WEIGHT: 188.5 LBS | RESPIRATION RATE: 18 BRPM | HEIGHT: 73 IN | TEMPERATURE: 98 F

## 2024-03-02 LAB
ANION GAP SERPL CALC-SCNC: 11 MMOL/L (ref 8–16)
BASOPHILS # BLD AUTO: 0.01 K/UL (ref 0–0.2)
BASOPHILS NFR BLD: 0.1 % (ref 0–1.9)
BUN SERPL-MCNC: 55 MG/DL (ref 8–23)
CALCIUM SERPL-MCNC: 7.5 MG/DL (ref 8.7–10.5)
CHLORIDE SERPL-SCNC: 102 MMOL/L (ref 95–110)
CO2 SERPL-SCNC: 25 MMOL/L (ref 23–29)
CREAT SERPL-MCNC: 6.6 MG/DL (ref 0.5–1.4)
DIFFERENTIAL METHOD BLD: ABNORMAL
EOSINOPHIL # BLD AUTO: 0.1 K/UL (ref 0–0.5)
EOSINOPHIL NFR BLD: 0.4 % (ref 0–8)
ERYTHROCYTE [DISTWIDTH] IN BLOOD BY AUTOMATED COUNT: 18.1 % (ref 11.5–14.5)
EST. GFR  (NO RACE VARIABLE): 8.5 ML/MIN/1.73 M^2
GLUCOSE SERPL-MCNC: 93 MG/DL (ref 70–110)
HCT VFR BLD AUTO: 25.4 % (ref 40–54)
HGB BLD-MCNC: 8 G/DL (ref 14–18)
IMM GRANULOCYTES # BLD AUTO: 0.09 K/UL (ref 0–0.04)
IMM GRANULOCYTES NFR BLD AUTO: 0.8 % (ref 0–0.5)
LYMPHOCYTES # BLD AUTO: 1.4 K/UL (ref 1–4.8)
LYMPHOCYTES NFR BLD: 11.3 % (ref 18–48)
MAGNESIUM SERPL-MCNC: 1.9 MG/DL (ref 1.6–2.6)
MCH RBC QN AUTO: 26.8 PG (ref 27–31)
MCHC RBC AUTO-ENTMCNC: 31.5 G/DL (ref 32–36)
MCV RBC AUTO: 85 FL (ref 82–98)
MONOCYTES # BLD AUTO: 0.8 K/UL (ref 0.3–1)
MONOCYTES NFR BLD: 7 % (ref 4–15)
NEUTROPHILS # BLD AUTO: 9.7 K/UL (ref 1.8–7.7)
NEUTROPHILS NFR BLD: 80.4 % (ref 38–73)
NRBC BLD-RTO: 0 /100 WBC
PHOSPHATE SERPL-MCNC: 3.1 MG/DL (ref 2.7–4.5)
PLATELET # BLD AUTO: 171 K/UL (ref 150–450)
PMV BLD AUTO: 13 FL (ref 9.2–12.9)
POCT GLUCOSE: 83 MG/DL (ref 70–110)
POCT GLUCOSE: 91 MG/DL (ref 70–110)
POCT GLUCOSE: 92 MG/DL (ref 70–110)
POTASSIUM SERPL-SCNC: 3.7 MMOL/L (ref 3.5–5.1)
RBC # BLD AUTO: 2.98 M/UL (ref 4.6–6.2)
SODIUM SERPL-SCNC: 138 MMOL/L (ref 136–145)
WBC # BLD AUTO: 12 K/UL (ref 3.9–12.7)

## 2024-03-02 PROCEDURE — 84100 ASSAY OF PHOSPHORUS: CPT | Performed by: STUDENT IN AN ORGANIZED HEALTH CARE EDUCATION/TRAINING PROGRAM

## 2024-03-02 PROCEDURE — 85025 COMPLETE CBC W/AUTO DIFF WBC: CPT | Performed by: STUDENT IN AN ORGANIZED HEALTH CARE EDUCATION/TRAINING PROGRAM

## 2024-03-02 PROCEDURE — 80048 BASIC METABOLIC PNL TOTAL CA: CPT | Performed by: STUDENT IN AN ORGANIZED HEALTH CARE EDUCATION/TRAINING PROGRAM

## 2024-03-02 PROCEDURE — 36415 COLL VENOUS BLD VENIPUNCTURE: CPT | Performed by: STUDENT IN AN ORGANIZED HEALTH CARE EDUCATION/TRAINING PROGRAM

## 2024-03-02 PROCEDURE — 90935 HEMODIALYSIS ONE EVALUATION: CPT

## 2024-03-02 PROCEDURE — 25000003 PHARM REV CODE 250: Performed by: STUDENT IN AN ORGANIZED HEALTH CARE EDUCATION/TRAINING PROGRAM

## 2024-03-02 PROCEDURE — 99233 SBSQ HOSP IP/OBS HIGH 50: CPT | Mod: ,,, | Performed by: INTERNAL MEDICINE

## 2024-03-02 PROCEDURE — 83735 ASSAY OF MAGNESIUM: CPT | Performed by: STUDENT IN AN ORGANIZED HEALTH CARE EDUCATION/TRAINING PROGRAM

## 2024-03-02 PROCEDURE — 63600175 PHARM REV CODE 636 W HCPCS

## 2024-03-02 PROCEDURE — 25000003 PHARM REV CODE 250

## 2024-03-02 RX ORDER — GUAIFENESIN 600 MG/1
600 TABLET, EXTENDED RELEASE ORAL 2 TIMES DAILY
Qty: 20 TABLET | Refills: 0 | Status: SHIPPED | OUTPATIENT
Start: 2024-03-02 | End: 2024-03-12

## 2024-03-02 RX ORDER — FUROSEMIDE 20 MG/1
80 TABLET ORAL 2 TIMES DAILY PRN
Qty: 60 TABLET | Refills: 11
Start: 2024-03-02 | End: 2025-03-02

## 2024-03-02 RX ORDER — ASPIRIN 81 MG/1
81 TABLET ORAL DAILY
Qty: 30 TABLET | Refills: 0 | Status: SHIPPED | OUTPATIENT
Start: 2024-03-02 | End: 2024-04-29

## 2024-03-02 RX ORDER — TACROLIMUS 1 MG/1
CAPSULE ORAL
Qty: 540 CAPSULE | Refills: 4 | Status: SHIPPED | OUTPATIENT
Start: 2024-03-02 | End: 2024-09-28

## 2024-03-02 RX ADMIN — ASPIRIN 81 MG: 81 TABLET, COATED ORAL at 11:03

## 2024-03-02 RX ADMIN — THERA TABS 1 TABLET: TAB at 11:03

## 2024-03-02 RX ADMIN — NIFEDIPINE 60 MG: 60 TABLET, FILM COATED, EXTENDED RELEASE ORAL at 11:03

## 2024-03-02 RX ADMIN — HYDRALAZINE HYDROCHLORIDE 50 MG: 50 TABLET ORAL at 05:03

## 2024-03-02 RX ADMIN — APIXABAN 5 MG: 5 TABLET, FILM COATED ORAL at 11:03

## 2024-03-02 RX ADMIN — CLOPIDOGREL BISULFATE 75 MG: 75 TABLET ORAL at 11:03

## 2024-03-02 RX ADMIN — DOXAZOSIN 4 MG: 2 TABLET ORAL at 11:03

## 2024-03-02 RX ADMIN — METOPROLOL SUCCINATE 12.5 MG: 25 TABLET, EXTENDED RELEASE ORAL at 11:03

## 2024-03-02 RX ADMIN — PREDNISONE 5 MG: 2.5 TABLET ORAL at 11:03

## 2024-03-02 RX ADMIN — TACROLIMUS 3 MG: 1 CAPSULE ORAL at 11:03

## 2024-03-02 RX ADMIN — AMIODARONE HYDROCHLORIDE 200 MG: 200 TABLET ORAL at 11:03

## 2024-03-02 RX ADMIN — FAMOTIDINE 20 MG: 20 TABLET ORAL at 11:03

## 2024-03-02 RX ADMIN — ATORVASTATIN CALCIUM 80 MG: 20 TABLET, FILM COATED ORAL at 11:03

## 2024-03-02 RX ADMIN — SEVELAMER CARBONATE 800 MG: 800 TABLET, FILM COATED ORAL at 11:03

## 2024-03-02 NOTE — PROGRESS NOTES
HD TX completed.  TX time- 3 hrs.  Net fluid removed -2 liters.  VSS.   Tolerated TX. Needles removed from exit sites.  Pressure applied until hemostasis achieved.  Report given to primary nurse.  Returned to room by wheelchair.

## 2024-03-02 NOTE — SUBJECTIVE & OBJECTIVE
Subjective:   History of Present Illness:    68 year old male with ESRD s/p kidney transplant x2 (1992, 2005) on chronic immunosuppressive medications Prograf 3mg BID and Prednisone 5mg QD, CAD s/p PCI (2006), HTN, HLD, Chronic diastolic CHF, tachycardia-bradycardia syndrome with periods of pAF admitted to vascular surgery.     C/C; Pt presented with crushing chest pain. Trop elevated to .08. ECG with ST depressions in inferior leads and dynamic changes. Bedside ECHO without WMA. Discussed case with IC staff. Initial plan for LHC although chest pain improved after 3 nitro and morphine.     Last admission; Presented for outpatient fistulogram (recently had the fistula created on 12/18/23) with plans to start HD however had a period of unresponsiveness after the procedure. Pt was drowsy but arousable and able to answer questions after the episode. Wife reports he's had episodes similar to this for about 1.5 years however he has never been evaluated for them. Wife denies associated urinary or fecal incontinence, tongue biting, or whole body shaking associated with these episodes. Pt has an essential tremor and always has BL arm/hand shaking which is at it's baseline per Pt and wife. No personal or FHx seizure. Does not measure his BP or HR at home or during these episodes of unresponsiveness.      KTM consulted for immunosuppressive medication management and need for dialysis.      Hospital Course:  Interval history: no acute events overnight, HD today. HD chair arranged at [facility] for TTS schedule.         Past Medical, Surgical, Family, and Social History:   Unchanged from H&P.    Scheduled Meds:   sodium chloride 0.9%   Intravenous Once    amiodarone  200 mg Oral Daily    apixaban  5 mg Oral BID    aspirin  81 mg Oral Daily    atorvastatin  80 mg Oral Daily    clopidogreL  75 mg Oral Daily    doxazosin  4 mg Oral Q12H    famotidine  20 mg Oral Daily    guaiFENesin  600 mg Oral BID    hydrALAZINE  50 mg  "Oral Q8H    metoprolol succinate  12.5 mg Oral Daily    multivitamin  1 tablet Oral Daily    NIFEdipine  60 mg Oral BID    predniSONE  5 mg Oral Daily    sevelamer carbonate  800 mg Oral TID WM    tacrolimus  3 mg Oral BID    tiotropium bromide  2 puff Inhalation Daily     Continuous Infusions:  PRN Meds:sodium chloride 0.9%, acetaminophen, albuterol-ipratropium, gabapentin, hydrALAZINE, HYDROcodone-acetaminophen, melatonin, nitroGLYCERIN, ondansetron, sodium chloride 0.9%, sodium chloride 0.9%, sodium chloride 0.9%    Intake/Output - Last 3 Shifts         02/29 0700  03/01 0659 03/01 0700  03/02 0659 03/02 0700  03/03 0659    P.O. 240 1082     Other 300  300    Total Intake(mL/kg) 540 (6.3) 1082 (12.7) 300 (3.5)    Urine (mL/kg/hr) 200 (0.1) 400 (0.2)     Other 1600  2600    Stool  0     Total Output 3564 408 1482    Net -1260 +682 -2300           Urine Occurrence 0 x 1 x     Stool Occurrence  0 x                Objective:     Vital Signs (Most Recent):  Temp: 97.5 °F (36.4 °C) (03/02/24 1145)  Pulse: 62 (03/02/24 1159)  Resp: 18 (03/02/24 1145)  BP: 138/66 (03/02/24 1145)  SpO2: 100 % (03/02/24 1145) Vital Signs (24h Range):  Temp:  [97.5 °F (36.4 °C)-98.7 °F (37.1 °C)] 97.5 °F (36.4 °C)  Pulse:  [57-69] 62  Resp:  [17-18] 18  SpO2:  [96 %-100 %] 100 %  BP: (124-149)/(60-70) 138/66     Weight: 85.5 kg (188 lb 7.9 oz)  Height: 6' 1" (185.4 cm)  Body mass index is 24.87 kg/m².     Physical Exam   Gen: in NAD, appears stated age  Neuro: AAOx3, motor, sensory, and strength grossly intact BL  HEENT: NTNC, EOMI, PERRL, MMM  CVS: RRR, no m/r/g; AVF of the LUE  Resp: lungs CTAB, no w/r/r;   Abd: BS+ in all 4 quadrants; NTND, soft to palpation  Extrem: no UE or LE edema BL          Labs within the past 24 hours have been reviewed.  "

## 2024-03-02 NOTE — DISCHARGE INSTRUCTIONS
Will plan for tacrolimus taper per transplant nephrology- tacrolimus 3mg twice daily for 2 months, THEN 2mg twice daily for 2 months, THEN 1mg twice daily for 2 months, and FINALLY 1mg once daily for 1 month- if any questions or concerns- reach out to nephrologist.     Take aspirin, plavix, and eliquis for 1 month- THEN stop aspirin, and continue plavix and eliquis for life.     In the case of worsening chest pain or shortness of breath, return to emergency department for further evaluation.     Nephrology Chair should be arranged for [place] for this Tuesday, 03/05/24.

## 2024-03-02 NOTE — HOSPITAL COURSE
Interval history: no acute events overnight, HD today. HD chair arranged at [facility] for TTS schedule.

## 2024-03-02 NOTE — PLAN OF CARE
Patient is ready for discharge. Patient stable alert and oriented. IVs removed. No complaints of pain. Discussed discharge plan. Reviewed medications and side effects, appointments, and answered questions with patient and family. RX delivered to bedside by [facility].          Problem: Adult Inpatient Plan of Care  Goal: Plan of Care Review  Outcome: Met  Goal: Patient-Specific Goal (Individualized)  Outcome: Met  Goal: Absence of Hospital-Acquired Illness or Injury  Outcome: Met  Goal: Optimal Comfort and Wellbeing  Outcome: Met  Goal: Readiness for Transition of Care  Outcome: Met     Problem: Fall Injury Risk  Goal: Absence of Fall and Fall-Related Injury  Outcome: Met     Problem: Device-Related Complication Risk (Hemodialysis)  Goal: Safe, Effective Therapy Delivery  Outcome: Met     Problem: Hemodynamic Instability (Hemodialysis)  Goal: Effective Tissue Perfusion  Outcome: Met     Problem: Infection (Hemodialysis)  Goal: Absence of Infection Signs and Symptoms  Outcome: Met     Problem: Arrhythmia/Dysrhythmia (Cardiac Catheterization)  Goal: Stable Heart Rate and Rhythm  Outcome: Met     Problem: Bleeding (Cardiac Catheterization)  Goal: Absence of Bleeding  Outcome: Met     Problem: Contrast-Induced Injury Risk (Cardiac Catheterization)  Goal: Absence of Contrast-Induced Injury  Outcome: Met     Problem: Embolism (Cardiac Catheterization)  Goal: Absence of Embolism Signs and Symptoms  Outcome: Met     Problem: Ongoing Anesthesia/Sedation Effects (Cardiac Catheterization)  Goal: Anesthesia/Sedation Recovery  Outcome: Met     Problem: Pain (Cardiac Catheterization)  Goal: Acceptable Pain Control  Outcome: Met     Problem: Vascular Access Protection (Cardiac Catheterization)  Goal: Absence of Vascular Access Complication  Outcome: Met

## 2024-03-02 NOTE — PROGRESS NOTES
[facility] - Cardiology Stepdown  Kidney Transplant  Progress Note      Reason for Follow-up: Reassessment of Kidney Transplant Referral - 10/24/2023 - Declined recipient and management of immunosuppression.         Subjective:   History of Present Illness:    68 year old male with ESRD s/p kidney transplant x2 (1992, 2005) on chronic immunosuppressive medications Prograf 3mg BID and Prednisone 5mg QD, CAD s/p PCI (2006), HTN, HLD, Chronic diastolic CHF, tachycardia-bradycardia syndrome with periods of pAF admitted to vascular surgery.     C/C; Pt presented with crushing chest pain. Trop elevated to .08. ECG with ST depressions in inferior leads and dynamic changes. Bedside ECHO without WMA. Discussed case with IC staff. Initial plan for LHC although chest pain improved after 3 nitro and morphine.     Last admission; Presented for outpatient fistulogram (recently had the fistula created on 12/18/23) with plans to start HD however had a period of unresponsiveness after the procedure. Pt was drowsy but arousable and able to answer questions after the episode. Wife reports he's had episodes similar to this for about 1.5 years however he has never been evaluated for them. Wife denies associated urinary or fecal incontinence, tongue biting, or whole body shaking associated with these episodes. Pt has an essential tremor and always has BL arm/hand shaking which is at it's baseline per Pt and wife. No personal or FHx seizure. Does not measure his BP or HR at home or during these episodes of unresponsiveness.      KTM consulted for immunosuppressive medication management and need for dialysis.      Hospital Course:  Interval history: no acute events overnight, HD today. HD chair arranged at [facility] for TTS schedule.         Past Medical, Surgical, Family, and Social History:   Unchanged from H&P.    Scheduled Meds:   sodium chloride 0.9%   Intravenous Once    amiodarone  200 mg Oral Daily    apixaban  5 mg Oral BID     "aspirin  81 mg Oral Daily    atorvastatin  80 mg Oral Daily    clopidogreL  75 mg Oral Daily    doxazosin  4 mg Oral Q12H    famotidine  20 mg Oral Daily    guaiFENesin  600 mg Oral BID    hydrALAZINE  50 mg Oral Q8H    metoprolol succinate  12.5 mg Oral Daily    multivitamin  1 tablet Oral Daily    NIFEdipine  60 mg Oral BID    predniSONE  5 mg Oral Daily    sevelamer carbonate  800 mg Oral TID WM    tacrolimus  3 mg Oral BID    tiotropium bromide  2 puff Inhalation Daily     Continuous Infusions:  PRN Meds:sodium chloride 0.9%, acetaminophen, albuterol-ipratropium, gabapentin, hydrALAZINE, HYDROcodone-acetaminophen, melatonin, nitroGLYCERIN, ondansetron, sodium chloride 0.9%, sodium chloride 0.9%, sodium chloride 0.9%    Intake/Output - Last 3 Shifts         02/29 0700  03/01 0659 03/01 0700  03/02 0659 03/02 0700  03/03 0659    P.O. 240 1082     Other 300  300    Total Intake(mL/kg) 540 (6.3) 1082 (12.7) 300 (3.5)    Urine (mL/kg/hr) 200 (0.1) 400 (0.2)     Other 1600  2600    Stool  0     Total Output [phone]    Net -1260 +682 -2300           Urine Occurrence 0 x 1 x     Stool Occurrence  0 x                Objective:     Vital Signs (Most Recent):  Temp: 97.5 °F (36.4 °C) (03/02/24 1145)  Pulse: 62 (03/02/24 1159)  Resp: 18 (03/02/24 1145)  BP: 138/66 (03/02/24 1145)  SpO2: 100 % (03/02/24 1145) Vital Signs (24h Range):  Temp:  [97.5 °F (36.4 °C)-98.7 °F (37.1 °C)] 97.5 °F (36.4 °C)  Pulse:  [57-69] 62  Resp:  [17-18] 18  SpO2:  [96 %-100 %] 100 %  BP: (124-149)/(60-70) 138/66     Weight: 85.5 kg (188 lb 7.9 oz)  Height: 6' 1" (185.4 cm)  Body mass index is 24.87 kg/m².     Physical Exam   Gen: in NAD, appears stated age  Neuro: AAOx3, motor, sensory, and strength grossly intact BL  HEENT: NTNC, EOMI, PERRL, MMM  CVS: RRR, no m/r/g; AVF of the LUE  Resp: lungs CTAB, no w/r/r;   Abd: BS+ in all 4 quadrants; NTND, soft to palpation  Extrem: no UE or LE edema BL          Labs within the past 24 hours have " been reviewed.  Assessment/Plan:             H/O kidney transplant  S/P 2 KT 1992 and 2005  Home immunosuppresants; Tacro 3mg BID and Pred 5mg QD  Baseline Scr 2.8-3.2,  Kidney function worsening. Requiring dialysis   Plan;  Would need tapering of Prograf (3mg BID --> 2 months, 2mg BID --> 2 months, 1mg BID 2 months and then at last 1mg QD for 1 month)              CAD (coronary artery disease)  CAD s/p CABG 2006  NSTEMI 02/23/23   Elevated Trops 13 on 02/25, 10 on 02/26, tripple vessel disease, not fit for CABG per interventional cardiology  stent placement 02/28     Electrolytes;  Non emergent          Acidosis;       HCO3 23       On NaHCO3 1300mg BID         Hypertension;      On Hydralazine, metoprolol, Nifedipine       Amy Bryant MD  Kidney Transplant  [facility] - Cardiology Stepdown

## 2024-03-02 NOTE — PLAN OF CARE
VSS on RA. BG monitored. Pt had a shower/bath with spouse's assistance. Pt has no complaints     Problem: Adult Inpatient Plan of Care  Goal: Absence of Hospital-Acquired Illness or Injury  Outcome: Ongoing, Progressing     Problem: Fall Injury Risk  Goal: Absence of Fall and Fall-Related Injury  Outcome: Ongoing, Progressing     Problem: Bleeding (Cardiac Catheterization)  Goal: Absence of Bleeding  Outcome: Ongoing, Progressing

## 2024-03-02 NOTE — PLAN OF CARE
[facility] - Cardiology Stepdown  Discharge Final Note    Primary Care Provider: Anatoliy Colindres MD    Expected Discharge Date: 3/2/2024    Final Discharge Note (most recent)       Final Note - 03/02/24 1231          Final Note    Assessment Type Final Discharge Note (P)      Anticipated Discharge Disposition Home or Self Care (P)      Hospital Resources/Appts/Education Provided Provided patient/caregiver with written discharge plan information;Provided education on problems/symptoms using teachback;Appointments scheduled and added to AVS (P)         Post-Acute Status    Post-Acute Authorization Dialysis (P)      Diaylsis Status Set-up Complete/Auth obtained (P)    [facility] (TTS)-12 noon. Starting 3/5/24.    Coverage [employer] (P)      Discharge Delays None known at this time (P)                      Important Message from Medicare  Important Message from Medicare regarding Discharge Appeal Rights: Given to patient/caregiver, Explained to patient/caregiver, Signed/date by patient/caregiver     Date IMM was signed: 03/01/24  Time IMM was signed: 3048    Contact Info       [facility] #[id number] [facility] [address]   Phone: [phone]       Next Steps: Go on 3/5/2024    Instructions: Schedule Tues/Thurs/Sat at 12:00PM  -Anticipated start date: 03/05/2024  **Pt must arrive 30 minutes early to initial appt to sign consents**          Pt. discharging home with spouse as caregiver. [facility] [place]. Spouse providing transportation home.     Daylin Pierre LMSW

## 2024-03-02 NOTE — DISCHARGE SUMMARY
[facility] - Cardiology [facility] Medicine  Discharge Summary      Patient Name: Ibrahima Phelps  MRN: [id number]  SHAVONNE: [id number]  Patient Class: IP- Inpatient  Admission Date: 2/23/2024  Hospital Length of Stay: 8 days  Discharge Date and Time:  03/02/2024 3:28 PM  Attending Physician: Yessy att. providers found   Discharging Provider: Matilde Bonilla MD  Primary Care Provider: Anatoliy Colindres MD  Hospital Medicine Team: [facility]  Matilde Bonilla MD  Primary Care Team: [facility]    HPI:   Mr. Phelps is a a 67 yo male with a PMHx of paroxysmal atrial fibrillation, tachy-loan syndrome, HFpEF with most recent LVEF 65%, CAD s/p PCI 2006, HTN, HLD, aortic atherosclerosis, secondary hyperparathyroidism, diverticulosis, COPD, GERD, hypothyroidism, gout, a history of tobacco use, obesity, ESRD s/p kidney transplant x2 (1992, 2005) on chronic immunosuppressive medication who presents with chest pain.      Recent admission and discharge 2/21/24. Syncope after vascular procedure. No syncopal episodes or events on telemetry were noted after admission. IV Lasix was given noting BNP > 2000 and overload on exam.     In the ED his HR was in 60-70s, -180s. Plts 121. K 2.9, Cr 7.8. Trop .08. BNP 1929. LA 1.71. CXr with pulmonary edema and pleural effusion on the right. ECF with AF with RVR with ST depressions in inferior leads and non-specific changes. Pt complaining of crushing chest pain which started this AM around 3 and continued until he got here. PT given Nitro in ED x3 and morphine twice. Pt noted chest pain is improved on my evaluation and he is chest pain free.     Procedure(s) (LRB):  Stent, Drug Eluting, Single Vessel, Coronary (N/A)  ANGIOGRAM, CORONARY ARTERY (N/A)      Hospital Course:   History of ESRD status post renal transplant x2 initially presented for chest pain, NSTEMI and on ACS protocol.  Admitted to CCU, LHC was initially planned however pt was noted to not be fully aware of  procedural risks and in addition due to renal dysfunction therefore LHC was aborted.  Eventually done 2/26.  Found to have multivessel disease.  CT surgery consulted, deemed to not be surgical candidate. Now going for planned multivessel PCI on 2/28. Had been hypervolemic on exam, likely requiring dialysis. CXR with pulmonary edema and increased size of small right pleural effusion. Fistula had previously created on 12/2023 with plan for HD in the future.  Was recently discharged on 02/21, HD recommended by KTM team however pt had declined. KTM currently on board, pt now agreeable for dialysis. First session was on 2/24/24. Patient underwent successful PCI to RCA w/2 DIEGO 02/28. He was awaiting outpatient HD chair- [facility] w/chair, but unable to complete admission process until Tuesday, 03/05, for bed- patient on T/Thr/Sat schedule- patient underwent HD 03/02. He was deemed stable for discharge afterwards. HD chair ready for outpatient utilization. Discussed need to continue asa/plavix/eliquis x1mo- then plavix/eliquis indefinitely- will arrange outpatient cards f/u. Discussed tacro taper per transplant nephro recommendations. Lasix to be utilized PRN vs. Scheduled with re-initiation of HD.     Pt deemed appropriate for discharge. Plan discussed with pt, who was agreeable and amenable; medications were discussed and reviewed, outpatient follow-up scheduled, ER precautions were given, all questions were answered to the pt's satisfaction, and Mr. Phelps was subsequently discharged.    Physical Exam  Gen: in NAD, appears stated age  Neuro: AAOx3, motor, sensory, and strength grossly intact BL  HEENT: NTNC, EOMI, PERRL, MMM  CVS: RRR, no m/r/g; S1/S2 auscultated with no S3 or S4; capillary refill < 2 sec, AVF of the LUE  Resp: lungs CTAB, no w/r/r; no belabored breathing or accessory muscle use appreciated   Abd: BS+ in all 4 quadrants; NTND, soft to palpation; no organomegaly appreciated   Extrem: pulses full, equal,  and regular over all 4 extremities; no UE or LE edema BL- loose skin of BL LE         Goals of Care Treatment Preferences:  Code Status: Full Code    Health care agent: Pt's wife is NOK  Health care agent number: No value filed.                   Consults:   Consults (From admission, onward)          Status Ordering Provider     Inpatient consult to Cardiothoracic Surgery  Once        Provider:  Kalia Davidson DO    Completed MYRNA, JOSEE     Inpatient consult to Interventional Cardiology  Once        Provider:  Geoffrey Paiz MD    Completed JOSEE NORRIS     Inpatient consult to Cardiology  Once        Provider:  Gautam Mancuso MD    Completed JOSEE NORRIS     Inpatient consult to Social Work  Once        Provider:  (Not yet assigned)    STEVEN Martinez     Inpatient consult to Kidney/Pancreas Transplant Medicine  Once        Provider:  (Not yet assigned)    Completed SHAWNA BEAN     Inpatient consult to Cardiology  Once        Provider:  (Not yet assigned)    Completed PATITO GLASS            Final Active Diagnoses:    Diagnosis Date Noted POA    PRINCIPAL PROBLEM:  NSTEMI (non-ST elevated myocardial infarction) [I21.4] 11/24/2023 Yes    Anemia of chronic kidney failure, stage 5 [N18.5, D63.1] 02/26/2024 Yes    Failed kidney transplant [T86.12] 02/24/2024 Yes    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] 02/24/2024 Yes    Acute on chronic diastolic heart failure [I50.33] 02/23/2024 Yes    ESRD (end stage renal disease) [N18.6] 11/06/2023 Yes    Immunocompromised state due to drug therapy [D84.821, Z79.899] 03/17/2023 Not Applicable    Chronic heart failure with preserved ejection fraction [I50.32] 03/17/2023 Yes    Paroxysmal atrial fibrillation [I48.0] 09/25/2017 Yes     Chronic    Troponin level elevated [R79.89] 01/22/2017 Yes    H/O kidney transplant [Z94.0] 07/10/2015 Not Applicable     Chronic    Mixed hyperlipidemia [E78.2]  Yes     Chronic    Primary  hypertension [I10]  Yes      Problems Resolved During this Admission:       Discharged Condition: stable    Disposition: Home or Self Care    Follow Up:   Follow-up Information       [facility] #[id number]. Go on 3/5/2024.    Why: Schedule Tues/Thurs/Sat at 12:00PM  -Anticipated start date: 03/05/2024  **Pt must arrive 30 minutes early to initial appt to sign consents**  Contact information:  [address] [facility] [address] [phone]                         Patient Instructions:      Ambulatory referral/consult to Cardiology   Standing Status: Future   Referral Priority: Routine Referral Type: Consultation   Referral Reason: Specialty Services Required   Requested Specialty: Cardiology   Number of Visits Requested: 1     Diet renal     Diet Cardiac     Notify your health care provider if you experience any of the following:  persistent nausea and vomiting or diarrhea     Notify your health care provider if you experience any of the following:  severe uncontrolled pain     Notify your health care provider if you experience any of the following:  difficulty breathing or increased cough     Activity as tolerated       Significant Diagnostic Studies: Labs: CMP   Recent Labs   Lab 03/01/24  0839 03/02/24  0606    138   K 4.1 3.7    102   CO2 25 25    93   BUN 47* 55*   CREATININE 5.6* 6.6*   CALCIUM 8.2* 7.5*   ANIONGAP 11 11   , CBC   Recent Labs   Lab 03/01/24  0839 03/02/24  0606   WBC 12.24 12.00   HGB 8.2* 8.0*   HCT 26.1* 25.4*    171   , and All labs within the past 24 hours have been reviewed    Pending Diagnostic Studies:       None           Medications:  Reconciled Home Medications:      Medication List        START taking these medications      aspirin 81 MG EC tablet  Commonly known as: ECOTRIN  Take 1 tablet (81 mg total) by mouth once daily.     MUCUS RELIEF  mg 12 hr tablet  Generic drug: guaiFENesin  Take 1 tablet (600 mg total) by mouth 2  (two) times daily. for 10 days            CHANGE how you take these medications      furosemide 20 MG tablet  Commonly known as: LASIX  Take 4 tablets (80 mg total) by mouth 2 (two) times daily as needed. For worsening LE swelling or shortness of breath on non-dialysis days  What changed:   medication strength  when to take this  reasons to take this  additional instructions     tacrolimus 1 MG Cap  Commonly known as: PROGRAF  Take 3 capsules (3 mg total) by mouth every 12 (twelve) hours for 60 days, THEN 2 capsules (2 mg total) every 12 (twelve) hours for 60 days, THEN 1 capsule (1 mg total) every 12 (twelve) hours for 60 days, THEN 1 capsule (1 mg total) once daily.  Start taking on: March 2, 2024  What changed: See the new instructions.            CONTINUE taking these medications      acetaminophen 500 MG tablet  Commonly known as: TYLENOL  Take 1 tablet (500 mg total) by mouth every 6 (six) hours as needed for Pain.     albuterol 90 mcg/actuation inhaler  Commonly known as: VENTOLIN HFA  Inhale 2 puffs into the lungs every 6 (six) hours as needed for Wheezing or Shortness of Breath. Rescue     albuterol-ipratropium 2.5 mg-0.5 mg/3 mL nebulizer solution  Commonly known as: DUO-NEB  Take 3 mLs by nebulization every 6 (six) hours as needed for Wheezing. Rescue. Can be used in place of albuterol inhaler.     ALLEGRA ORAL  Take 1 tablet by mouth daily as needed (allergies).     amiodarone 200 MG Tab  Commonly known as: PACERONE  Take 1 tablet (200 mg total) by mouth once daily.     apixaban 5 mg Tab  Commonly known as: ELIQUIS  Take 1 tablet (5 mg total) by mouth 2 (two) times daily.     atorvastatin 40 MG tablet  Commonly known as: LIPITOR  Take 2 tablets (80 mg total) by mouth once daily.     b complex vitamins tablet  Commonly known as: B COMPLEX-VITAMIN B12  Take 1 tablet by mouth once daily.     CALCIUM 600 ORAL  Take 1 tablet by mouth 2 (two) times a day.     CHOLECALCIFEROL (VITAMIN D3) ORAL  Take 2 tablets  by mouth 2 (two) times daily.     clopidogreL 75 mg tablet  Commonly known as: PLAVIX  Take 1 tablet (75 mg total) by mouth once daily.     doxazosin 4 MG tablet  Commonly known as: CARDURA  Take 1 tablet (4 mg total) by mouth every 12 (twelve) hours.     famotidine 20 MG tablet  Commonly known as: PEPCID  Take 1 tablet (20 mg total) by mouth 2 (two) times daily.     fluticasone propionate 50 mcg/actuation nasal spray  Commonly known as: FLONASE  1 spray (50 mcg total) by Each Nostril route daily as needed for Allergies.     FLUZONE HIGHDOSE QUAD 23-24  mcg/0.7 mL Syrg  Generic drug: flu vacc [id number](65yr up)-PF     gabapentin 100 MG capsule  Commonly known as: NEURONTIN  Take 1 capsule (100 mg total) by mouth as needed (pain).     hydrALAZINE 50 MG tablet  Commonly known as: APRESOLINE  TAKE 1 TABLET (50 MG TOTAL) BY MOUTH 3 (THREE) TIMES DAILY.     HYDROcodone-acetaminophen 5-325 mg per tablet  Commonly known as: NORCO  Take 1 tablet by mouth every 6 (six) hours as needed for Pain.     metoprolol succinate 25 MG 24 hr tablet  Commonly known as: TOPROL-XL  Take 0.5 tablets (12.5 mg total) by mouth once daily.     MITIGARE 0.6 mg Cap  Generic drug: colchicine  TAKE 1 CAPSULE BY MOUTH TWICE A DAY AS NEEDED GOUT FLARE UP TO 3 DAYS     multivitamin per tablet  Commonly known as: THERAGRAN  Take 1 tablet by mouth Daily.     NIFEdipine 60 MG (OSM) 24 hr tablet  Commonly known as: PROCARDIA-XL  Take 1 tablet (60 mg total) by mouth 2 (two) times a day.     nitroGLYCERIN 0.4 MG SL tablet  Commonly known as: NITROSTAT  Place 1 tablet (0.4 mg total) under the tongue every 5 (five) minutes as needed for Chest pain.     omeprazole 20 MG capsule  Commonly known as: PRILOSEC  Take 1 capsule (20 mg total) by mouth daily as needed (heartburn).     paricalcitoL 1 MCG capsule  Commonly known as: ZEMPLAR  TAKE 1 CAPSULE ON MONDAY, WEDNESDAY AND FRIDAY     PFIZER COVID BIVAL(12Y UP)(PF) 30 mcg/0.3 mL injection  Generic drug:  sars-cov-2 (covid-19 pfizer omicron)  Inject into the muscle.     predniSONE 5 MG tablet  Commonly known as: DELTASONE  Take 1 tablet (5 mg total) by mouth once daily.     pulse oximeter device  Commonly known as: pulse oximeter  by Apply Externally route 2 (two) times a day. Use twice daily at 8 AM and 3 PM and record the value in [employer] as directed.     sevelamer carbonate 800 mg Tab  Commonly known as: RENVELA  Take 1 tablet (800 mg total) by mouth 3 (three) times daily with meals.     SPIKEVAX 8901-5669(12Y UP)(PF) 50 mcg/0.5 mL injection  Generic drug: COVID fym44-94(12up)(andu)(PF)     tiotropium bromide 1.25 mcg/actuation inhaler  Commonly known as: SPIRIVA RESPIMAT  Inhale 2 puffs into the lungs once daily. Controller. Use this inhaler every day.            STOP taking these medications      isosorbide mononitrate 30 MG 24 hr tablet  Commonly known as: IMDUR     potassium chloride 10 MEQ Tbsr  Commonly known as: KLOR-CON     sodium polystyrene powder  Commonly known as: KAYEXALATE     traZODone 50 MG tablet  Commonly known as: DESYREL            ASK your doctor about these medications      fluticasone furoate-vilanteroL 100-25 mcg/dose diskus inhaler  Commonly known as: BREO  Inhale 1 puff into the lungs once daily. Controller. Use this inhaler every day.              Indwelling Lines/Drains at time of discharge:   Lines/Drains/Airways       Drain  Duration                  Hemodialysis AV Fistula Left upper arm -- days         Hemodialysis AV Fistula 02/19/24 0924 Left upper arm 12 days                    Time spent on the discharge of patient: 45 minutes         Matilde Bonilla MD  Department of Hospital Medicine  [facility] - Cardiology Stepdown

## 2024-03-02 NOTE — NURSING TRANSFER
Nursing Transfer Note      3/2/2024   7:33 AM    Reason patient is being transferred: Dialysis    Transfer From: 317    Transfer via wheelchair    Transfer with cardiac monitoring    Telemetry: Box Number [phone]  Order for Tele Monitor? Yes    Medicines sent: N/A      Patient belongings transferred with patient: No    Chart send with patient: No    Notified: spouse

## 2024-03-02 NOTE — PROGRESS NOTES
Patient arrived by wheelchair.  Awake, alert and responsive.  VSS.   HD TX startted via MIRA fistula using 16 g needles/

## 2024-03-03 RX ORDER — ATORVASTATIN CALCIUM 40 MG/1
80 TABLET, FILM COATED ORAL DAILY
Qty: 180 TABLET | Refills: 3 | Status: CANCELLED | OUTPATIENT
Start: 2024-03-03 | End: 2025-03-03

## 2024-03-04 ENCOUNTER — OFFICE VISIT (OUTPATIENT)
Dept: CARDIOLOGY | Facility: CLINIC | Age: 69
End: 2024-03-04
Payer: MEDICARE

## 2024-03-04 VITALS
HEIGHT: 73 IN | WEIGHT: 186.5 LBS | HEART RATE: 65 BPM | SYSTOLIC BLOOD PRESSURE: 138 MMHG | OXYGEN SATURATION: 100 % | BODY MASS INDEX: 24.72 KG/M2 | DIASTOLIC BLOOD PRESSURE: 63 MMHG

## 2024-03-04 DIAGNOSIS — N18.6 ESRD (END STAGE RENAL DISEASE): ICD-10-CM

## 2024-03-04 DIAGNOSIS — I10 PRIMARY HYPERTENSION: ICD-10-CM

## 2024-03-04 DIAGNOSIS — I50.33 ACUTE ON CHRONIC DIASTOLIC HEART FAILURE: ICD-10-CM

## 2024-03-04 DIAGNOSIS — I35.0 MODERATE AORTIC STENOSIS: ICD-10-CM

## 2024-03-04 DIAGNOSIS — I21.4 NSTEMI (NON-ST ELEVATED MYOCARDIAL INFARCTION): ICD-10-CM

## 2024-03-04 DIAGNOSIS — Z94.0 H/O KIDNEY TRANSPLANT: Chronic | ICD-10-CM

## 2024-03-04 DIAGNOSIS — Z79.01 LONG TERM (CURRENT) USE OF ANTICOAGULANTS: Chronic | ICD-10-CM

## 2024-03-04 DIAGNOSIS — I70.0 AORTIC ATHEROSCLEROSIS: ICD-10-CM

## 2024-03-04 DIAGNOSIS — E78.2 MIXED HYPERLIPIDEMIA: Chronic | ICD-10-CM

## 2024-03-04 DIAGNOSIS — I25.10 CORONARY ARTERY DISEASE INVOLVING NATIVE CORONARY ARTERY OF NATIVE HEART WITHOUT ANGINA PECTORIS: Primary | Chronic | ICD-10-CM

## 2024-03-04 DIAGNOSIS — I48.0 PAROXYSMAL ATRIAL FIBRILLATION: Chronic | ICD-10-CM

## 2024-03-04 DIAGNOSIS — Z95.5 H/O HEART ARTERY STENT: ICD-10-CM

## 2024-03-04 DIAGNOSIS — I77.9 CAROTID ARTERY DISEASE, UNSPECIFIED LATERALITY, UNSPECIFIED TYPE: Chronic | ICD-10-CM

## 2024-03-04 DIAGNOSIS — I50.32 CHRONIC HEART FAILURE WITH PRESERVED EJECTION FRACTION: ICD-10-CM

## 2024-03-04 PROCEDURE — 1111F DSCHRG MED/CURRENT MED MERGE: CPT | Mod: CPTII,S$GLB,, | Performed by: PHYSICIAN ASSISTANT

## 2024-03-04 PROCEDURE — 1125F AMNT PAIN NOTED PAIN PRSNT: CPT | Mod: CPTII,S$GLB,, | Performed by: PHYSICIAN ASSISTANT

## 2024-03-04 PROCEDURE — 1101F PT FALLS ASSESS-DOCD LE1/YR: CPT | Mod: CPTII,S$GLB,, | Performed by: PHYSICIAN ASSISTANT

## 2024-03-04 PROCEDURE — 3288F FALL RISK ASSESSMENT DOCD: CPT | Mod: CPTII,S$GLB,, | Performed by: PHYSICIAN ASSISTANT

## 2024-03-04 PROCEDURE — 99999 PR PBB SHADOW E&M-EST. PATIENT-LVL III: CPT | Mod: PBBFAC,,, | Performed by: PHYSICIAN ASSISTANT

## 2024-03-04 PROCEDURE — 3078F DIAST BP <80 MM HG: CPT | Mod: CPTII,S$GLB,, | Performed by: PHYSICIAN ASSISTANT

## 2024-03-04 PROCEDURE — 3066F NEPHROPATHY DOC TX: CPT | Mod: CPTII,S$GLB,, | Performed by: PHYSICIAN ASSISTANT

## 2024-03-04 PROCEDURE — 99214 OFFICE O/P EST MOD 30 MIN: CPT | Mod: S$GLB,,, | Performed by: PHYSICIAN ASSISTANT

## 2024-03-04 PROCEDURE — 3075F SYST BP GE 130 - 139MM HG: CPT | Mod: CPTII,S$GLB,, | Performed by: PHYSICIAN ASSISTANT

## 2024-03-04 PROCEDURE — 3008F BODY MASS INDEX DOCD: CPT | Mod: CPTII,S$GLB,, | Performed by: PHYSICIAN ASSISTANT

## 2024-03-04 NOTE — PHYSICIAN QUERY
PT Name: Ibrahima Phelps  MR #: [id number]     DOCUMENTATION CLARIFICATION     CDS/: COLLIN Yeung, RN, CCDS               Contact information: [email or web address]  This form is a permanent document in the medical record.     Query Date: March 4, 2024    By submitting this query, we are merely seeking further clarification of documentation.  Please utilize your independent clinical judgment when addressing the question(s) below.    The Medical Record contains the following   Indicators Supporting Clinical Findings Location in Medical Record   X Heart Failure documented Acute on chronic diastolic heart failure     Chronic heart failure with preserved ejection fraction    DCS: Dr. Bonilla 3/2   X BNP 1930   Lab: 2/23   X EF/Echo normal systolic function with a visually estimated ejection fraction of 60 - 65%. Grade II diastolic dysfunction    Echo: 2/26   X Radiology findings progressed patchy though diffuse bilateral interstitial and less so ground-glass-airspace opacities. Latter findings could relate to progressive infection and or worsening pulmonary edema    CXR: 2/23   X Subjective/Objective Respiratory Conditions Positive for shortness of breath.    Cards: Dr. Lincoln 2/26    Recent/Current MI      Heart Transplant, LVAD     X Edema, JVD JVD (+)    Cards: Dr. Carvajal 2/25    Ascites     X Diuretics/Meds furosemide injection 120 mg  Dose: 120 mg  Freq: Every 12 hours Route: IV  Start: 02/24/24 0745 End: 02/24/24 0952    MAR    Other Treatment     X Other admitted with clear volume overload. CP is likley from increased LVEDP.    Cards: Dr. Villanueva 2/23     Heart failure is a clinical diagnosis which includes symptomatic fluid retention, elevated intracardiac pressures, and/or the inability of the heart to deliver adequate blood flow.    Heart Failure with reduced Ejection Fraction (HFrEF) or Systolic Heart Failure (loses ability to contract normally, EF is <40%)    Heart Failure with preserved Ejection  Fraction (HFpEF) or Diastolic Heart Failure (stiff ventricles, does not relax properly, EF is >50%)     Heart Failure with Combined Systolic and Diastolic Failure (stiff ventricles, does not relax properly and EF is <50%)    Mid-range or mildly reduced ejection fraction (HFmrEF) is classified as systolic heart failure.  Congestive heart failure with a recovered EF is classified as Diastolic Heart Failure.  Common clues to acute exacerbation:  Rapidly progressive symptoms (w/in 2 weeks of presentation), using IV diuretics, using supplemental O2, pulmonary edema on Xray, new or worsening pleural effusion, +JVD or other signs of volume overload, MI w/in 4 weeks, and/or BNP >500  The clinical guidelines noted are only system guidelines, and do not replace the providers clinical judgment.    Provider, please clarify conflicting acuity of diastolic CHF diagnosis associated with the above clinical findings.    [ x  ]  Acute on Chronic Diastolic Heart Failure (HFpEF) - worsening of CHF signs/symptoms in preexisting CHF   [   ]  Chronic Diastolic Heart Failure (HFpEF) - preexisting and stable   [   ]  Other (please specify): ___________________________________       Please document in your progress notes daily for the duration of treatment until resolved and include in your discharge summary.    References:  American Heart Association editorial staff. (2017, May). Ejection Fraction Heart Failure Measurement. American Heart Association. https://www.heart.org/en/health-topics/heart-failure/diagnosing-heart-failure/ejection-fraction-heart-failure-measurement#:~:text=Ejection%20fraction%20(EF)%20is%20a,pushed%20out%20with%20each%20heartbeat  SHAHRZAD Gómez (2020, December 15). Heart failure with preserved ejection fraction: Clinical manifestations and diagnosis. [employer]. https://www.[employer].com/contents/heart-failure-with-preserved-ejection-fraction-clinical-manifestations-and-diagnosis.  ICD-10-CM/PCS Coding Clinic Third  Quarter ICD-10, Effective with discharges: September 8, 2020 [facility] § Heart failure with mid-range or mildly reduced ejection fraction (2020).  ICD-10-CM/PCS Coding Clinic Third Quarter ICD-10, Effective with discharges: September 8, 2020 [facility] § Heart failure with recovered ejection fraction (2020).  Form No. [id number]

## 2024-03-04 NOTE — TELEPHONE ENCOUNTER
No care due was identified.  [facility] Embedded Care Due Messages. Reference number: [id number].   3/04/2024 3:47:15 PM CST

## 2024-03-04 NOTE — TELEPHONE ENCOUNTER
Refill Routing Note   Medication(s) are not appropriate for processing by [facility] for the following reason(s):        Required labs outdated: FLOS 4.12.2024    [facility] action(s):  Defer      Medication Therapy Plan:         Appointments  past 12m or future 3m with PCP    Date Provider   Last Visit   2/2/2024 Anatoliy Colindres MD   Next Visit   3/15/2024 Anatoliy Colindres MD   ED visits in past 90 days: 0        Note composed:3:46 PM 03/04/2024

## 2024-03-04 NOTE — PHYSICIAN QUERY
PT Name: Ibrahima Phelps  MR #: [id number]     DOCUMENTATION CLARIFICATION     CDS/: COLLIN Yeung, RN, CCDS              Contact information: [email or web address]  This form is a permanent document in the medical record.     Query Date: March 4, 2024    By submitting this query, we are merely seeking further clarification of documentation.  Please utilize your independent clinical judgment when addressing the question(s) below.  The Medical Record contains the following   Indicators   Supporting Clinical Findings Location in Medical Record    Respiratory failure     X Subjective Respiratory Signs/Symptoms: SOB, DAUGHERTY, Cough, etc. presenting to [facility] Emergency Department via EMS on CPAP for evaluation of dyspnea    ED note: Dr. Cordova 2/23   X Objective Respiratory Signs/Symptoms: Respiratory distress, Accessory muscle use, tachypnea, wheezing, etc. He is in respiratory distress.     Moderately increased work of breathing, supraclavicular retractions  Mild diffuse wheezes and bibasilar crackles, tachypneic    ED note: Dr. Cordova 2/23   X RR         O2 sat         O2 use Per EMS patient was 88% on RA, now 100% on CPAP.     RR: 24  SpO2: 100% on CPAP    SpO2: 91%   ED note: Dr. Cordova 2/23          Flowsheet: 2/24   X Blood Gas (ABG or VBG) VBG: pH: 7.4; pCO2: 28.7; pO2: 38   ED note: Dr. Cordova 2/23    Hypoxia/Hypercapnia      BiPAP/Intubation/Mechanical Ventilation      Home O2, Oxygen Dependence      Radiology findings     X Acute/Chronic Illness PMH of paroxysmal a fib, CHF, HLD, HTN, hypothyroidism, COPD, ESRD s/p kidney transplant, CKD III in transplanted kidney on immunosuppressive medications with plans to restart dialysis soon     admit the patient to CICU for possible LHC and further evaluation and management of NSTEMI and CHF exacerbation.   ED note: Dr. Cordova 2/23    Treatment      Other         The clinical guidelines noted are only a system guideline. It does not replace the providers clinical  judgment.      [facility] Approved Diagnostic Criteria      Acute Respiratory Failure    Hypoxic: ABG pO2<60 mmHg or O2 sat of <91% on RA   AND/OR   Hypercapnic: ABG pCO2>50 mmHg with pH <7.35   AND   Respiratory symptoms documented (Subjective: SOB; Objective: Tachypnea, respiratory distress, increased work of breathing, unable to speak in complete sentences, labored breathing, use of accessory muscles, RR>26, cyanosis, dyspnea, wheezing, stridor, lethargy)      Acute Respiratory Distress - Generally describes less severe respiratory symptoms (tachypnea, in respiratory distress, increased work of breathing, unable to speak in complete sentences, labored breathing, use of accessory muscles, RR> 24, cyanosis, dyspnea, wheezing, stridor, lethargy) without sufficient measurements (pO2, SpO2, pH, and pCO2) to meet criteria for respiratory failure)   Acute Respiratory Insufficiency - Generally describes less severe respiratory symptoms and measurements (pO2, SpO2, pH, and pCO2) not meeting criteria for respiratory failure         Provider, please specify the respiratory diagnosis associated with above clinical findings.   [  x  ] Acute Respiratory Failure with Hypoxia   [    ] Acute Respiratory Distress   [    ] Hypoxia Only   [    ] Other Respiratory Diagnosis (please specify): _________________       Please document in your progress notes daily for the duration of treatment until resolved and include in your discharge summary.     Reference:    DANISH Maldonado MD. (2020, March 13). Acute respiratory distress syndrome: Clinical features, diagnosis, and complications in adults ([id number] MADHAVI Gaona MD & [id number] PRASAD Stubbs MD, Eds.). Retrieved November 13, 2020, from https://www.[employer]/contents/acute-respiratory-distress-syndrome-clinical-features-diagnosis-and-complications-in-adults?search=ards&source=search_result&selectedTitle=1~150&usage_type=default&display_rank=1  Form  No. [id number]

## 2024-03-04 NOTE — PROGRESS NOTES
Cardiology Clinic Note  Reason for Visit: Hospital follow up     HPI:     PROBLEM LIST:  PAF  CAD s/p PCI 2006  - PCI to RCA w/ 2 DIEGO 2/28/2024  HFpEF  HTN  HLD  Moderate AS  Carotid stenoses (20-39% bilateral)  H/o pericarditis  CKD3  S/p kidney transplant  Cigarette smoker      Ibrahima Phelps is a 68 y.o. M, who presents for hospital follow up. He was discharged following PCI two days ago. He denies any chest pain or shortness of breath. He understands the recommendation to continue ASA, plavix and eliquis for one month, followed by plavix and eliquis indefinitely. His dose of atorvastatin was increased to 80 mg during recent hospitalization. He is currently dealing with an episode of gout. He continues outpatient HD x 3 days.       Hospital Course:   History of ESRD status post renal transplant x2 initially presented for chest pain, NSTEMI and on ACS protocol.  Admitted to CCU, LHC was initially planned however pt was noted to not be fully aware of procedural risks and in addition due to renal dysfunction therefore LHC was aborted.  Eventually done 2/26.  Found to have multivessel disease.  CT surgery consulted, deemed to not be surgical candidate. Now going for planned multivessel PCI on 2/28. Had been hypervolemic on exam, likely requiring dialysis. CXR with pulmonary edema and increased size of small right pleural effusion. Fistula had previously created on 12/2023 with plan for HD in the future.  Was recently discharged on 02/21, HD recommended by KTM team however pt had declined. KTM currently on board, pt now agreeable for dialysis. First session was on 2/24/24. Patient underwent successful PCI to RCA w/2 DIEGO 02/28. He was awaiting outpatient HD chair- [place] w/, but unable to complete admission process until Tuesday, 03/05, for bed- patient on T/Thr/Sat schedule- patient underwent HD 03/02. He was deemed stable for discharge afterwards. HD chair ready for outpatient utilization. Discussed  need to continue asa/plavix/eliquis x1mo- then plavix/eliquis indefinitely- will arrange outpatient cards f/u. Discussed tacro taper per transplant nephro recommendations. Lasix to be utilized PRN vs. Scheduled with re-initiation of HD.      Pt deemed appropriate for discharge. Plan discussed with pt, who was agreeable and amenable; medications were discussed and reviewed, outpatient follow-up scheduled, ER precautions were given, all questions were answered to the pt's satisfaction, and Mr. Phelps was subsequently discharged.    ROS:    Pertinent ROS included in HPI and below.  PMH:     Past Medical History:   Diagnosis Date    Atrial fibrillation     CAD (coronary artery disease) 2006    MI in 2006    CHF (congestive heart failure) 2017    CKD (chronic kidney disease) stage 3, GFR 30-59 ml/min     Dr. Latif.  in transplanted kidney    CKD (chronic kidney disease) stage 5, GFR less than 15 ml/min 02/02/2024    COVID-19 02/07/2023    Diverticulosis     Hyperlipidemia     Hypertension     Keloid cicatrix     Metabolic bone disease     Other emphysema 10/01/2019    Pericarditis     S/P kidney transplant 1992    x2 (1992 & 2005),    Thrombocytopenia     Thyroid disease     Tobacco use 09/05/2014     Past Surgical History:   Procedure Laterality Date    AV FISTULA PLACEMENT Left 12/18/2023    Procedure: CREATION, AV FISTULA;  Surgeon: JUANI Melendez III, MD;  Location: [facility] OR [address] [place] [address];  Service: Vascular;  Laterality: Left;  LUE AVF creation vs AVG insertion    CARDIOVERSION  04/30/15    CHOLECYSTECTOMY      COLONOSCOPY  April 20, 2011    Diverticulosis, repeat recommended in 3 yrs., repeat colonoscopy 2014 revealed 2 polyps.  He should return in 5 years.    COLONOSCOPY N/A 3/13/2020    Procedure: COLONOSCOPY;  Surgeon: Chon Casper MD;  Location: [facility] (4TH FLR);  Service: Endoscopy;  Laterality: N/A;  ok to hold coumadin x5days-see telephone encounter 2/4/20-tb    COLONOSCOPY N/A 2/4/2021    Procedure:  "COLONOSCOPY;  Surgeon: Chon Casper MD;  Location: [facility] (4TH FLR);  Service: Endoscopy;  Laterality: N/A;  Eliquis - per Dr. GAVIN Blunt ok to hold x 2 days and "restarted asap"- ERW  last prep "poor", [id number] ordered/ Pt requests Dr. Casper  prep ins. mailed - COVID screening on 2/1/21 PCW- ERW    COLONOSCOPY N/A 3/3/2021    Procedure: COLONOSCOPY;  Surgeon: Chon Casper MD;  Location: [facility] (4TH FLR);  Service: Endoscopy;  Laterality: N/A;  Patient with his second poor bowel pre and has poor prep today.  If patient not intersted or can't get colonoscopy tomorrow will need constipation bowel prep and will need to restart his Eliquis today.Thanks,Chon     per Dr Blunt-ok to hold Eliquis 2 days prior      COVID     CORONARY ANGIOGRAPHY N/A 2/28/2024    Procedure: ANGIOGRAM, CORONARY ARTERY;  Surgeon: Tylor Lincoln MD;  Location: [facility] CATH LAB;  Service: Cardiology;  Laterality: N/A;    CORONARY ANGIOPLASTY WITH STENT PLACEMENT  9/13/2006    FISTULOGRAM N/A 2/19/2024    Procedure: Fistulogram;  Surgeon: JUANI Melendez III, MD;  Location: [facility] CATH LAB;  Service: Peripheral Vascular;  Laterality: N/A;    IRRIGATION AND DEBRIDEMENT Right 8/30/2023    Procedure: IRRIGATION AND DEBRIDEMENT, RIGHT MIDDLE FINGER;  Surgeon: Martin Larsen MD;  [facility];  Service: Orthopedics;  Laterality: Right;    KIDNEY TRANSPLANT  2005    LEFT HEART CATHETERIZATION N/A 2/26/2024    Procedure: Left heart cath;  Surgeon: Tylor Lincoln MD;  Location: [facility] CATH LAB;  Service: Cardiology;  Laterality: N/A;    PARATHYROIDECTOMY      PERCUTANEOUS TRANSLUMINAL ANGIOPLASTY OF ARTERIOVENOUS FISTULA Left 2/19/2024    Procedure: PTA, AV FISTULA;  Surgeon: JUANI Melendez III, MD;  Location: [facility] CATH LAB;  Service: Peripheral Vascular;  Laterality: Left;    STENT, DRUG ELUTING, SINGLE VESSEL, CORONARY N/A 2/28/2024    Procedure: Stent, Drug Eluting, Single Vessel, Coronary;  Surgeon: Salazar" Tylor WYLIE MD;  Location: [facility] CATH LAB;  Service: Cardiology;  Laterality: N/A;    TREATMENT OF CARDIAC ARRHYTHMIA N/A 3/28/2022    Procedure: CARDIOVERSION;  Surgeon: Migue Blunt MD;  Location: [facility] EP LAB;  Service: Cardiology;  Laterality: N/A;  AF, DCC, MAC, GP, 3 PREP*RODRIGO deferred pt 100% compliant*     Allergies:     Review of patient's allergies indicates:   Allergen Reactions    Ace inhibitors Swelling    Verapamil Other (See Comments)     Other reaction(s): Unknown    Povidone-iodine Itching     Medications:     Current Outpatient Medications on File Prior to Visit   Medication Sig Dispense Refill    acetaminophen (TYLENOL) 500 MG tablet Take 1 tablet (500 mg total) by mouth every 6 (six) hours as needed for Pain. 20 tablet 0    albuterol (VENTOLIN HFA) 90 mcg/actuation inhaler Inhale 2 puffs into the lungs every 6 (six) hours as needed for Wheezing or Shortness of Breath. Rescue 18 g 3    albuterol-ipratropium (DUO-NEB) 2.5 mg-0.5 mg/3 mL nebulizer solution Take 3 mLs by nebulization every 6 (six) hours as needed for Wheezing. Rescue. Can be used in place of albuterol inhaler. 75 mL 0    amiodarone (PACERONE) 200 MG Tab Take 1 tablet (200 mg total) by mouth once daily. 30 tablet 11    apixaban (ELIQUIS) 5 mg Tab Take 1 tablet (5 mg total) by mouth 2 (two) times daily. 180 tablet 3    aspirin (ECOTRIN) 81 MG EC tablet Take 1 tablet (81 mg total) by mouth once daily. 30 tablet 0    atorvastatin (LIPITOR) 40 MG tablet Take 2 tablets (80 mg total) by mouth once daily. 180 tablet 3    b complex vitamins (B COMPLEX-VITAMIN B12) tablet Take 1 tablet by mouth once daily. 90 tablet 3    calcium carbonate (CALCIUM 600 ORAL) Take 1 tablet by mouth 2 (two) times a day.      CHOLECALCIFEROL, VITAMIN D3, ORAL Take 2 tablets by mouth 2 (two) times daily.      clopidogreL (PLAVIX) 75 mg tablet Take 1 tablet (75 mg total) by mouth once daily. 30 tablet 11    doxazosin (CARDURA) 4 MG tablet Take 1 tablet (4 mg total) by  mouth every 12 (twelve) hours. 180 tablet 3    famotidine (PEPCID) 20 MG tablet Take 1 tablet (20 mg total) by mouth 2 (two) times daily. 180 tablet 3    fexofenadine HCl (ALLEGRA ORAL) Take 1 tablet by mouth daily as needed (allergies).      fluticasone furoate-vilanteroL (BREO) 100-25 mcg/dose diskus inhaler Inhale 1 puff into the lungs once daily. Controller. Use this inhaler every day.      fluticasone propionate (FLONASE) 50 mcg/actuation nasal spray 1 spray (50 mcg total) by Each Nostril route daily as needed for Allergies.      FLUZONE HIGHDOSE QUAD 23-24  mcg/0.7 mL Syrg       furosemide (LASIX) 20 MG tablet Take 4 tablets (80 mg total) by mouth 2 (two) times daily as needed. For worsening LE swelling or shortness of breath on non-dialysis days 60 tablet 11    gabapentin (NEURONTIN) 100 MG capsule Take 1 capsule (100 mg total) by mouth as needed (pain).      guaiFENesin (MUCINEX) 600 mg 12 hr tablet Take 1 tablet (600 mg total) by mouth 2 (two) times daily. for 10 days 20 tablet 0    hydrALAZINE (APRESOLINE) 50 MG tablet TAKE 1 TABLET (50 MG TOTAL) BY MOUTH 3 (THREE) TIMES DAILY. 270 tablet 3    HYDROcodone-acetaminophen (NORCO) 5-325 mg per tablet Take 1 tablet by mouth every 6 (six) hours as needed for Pain. 15 tablet 0    metoprolol succinate (TOPROL-XL) 25 MG 24 hr tablet Take 0.5 tablets (12.5 mg total) by mouth once daily. 15 tablet 11    MITIGARE 0.6 mg Cap TAKE 1 CAPSULE BY MOUTH TWICE A DAY AS NEEDED GOUT FLARE UP TO 3 DAYS 15 capsule 11    multivitamin (THERAGRAN) per tablet Take 1 tablet by mouth Daily.      NIFEdipine (PROCARDIA-XL) 60 MG (OSM) 24 hr tablet Take 1 tablet (60 mg total) by mouth 2 (two) times a day. 180 tablet 3    nitroGLYCERIN (NITROSTAT) 0.4 MG SL tablet Place 1 tablet (0.4 mg total) under the tongue every 5 (five) minutes as needed for Chest pain. 25 tablet 0    omeprazole (PRILOSEC) 20 MG capsule Take 1 capsule (20 mg total) by mouth daily as needed (heartburn).       paricalcitoL (ZEMPLAR) 1 MCG capsule TAKE 1 CAPSULE ON MONDAY, WEDNESDAY AND FRIDAY 36 capsule 3    PFIZER COVID BIVAL,12Y UP,,PF, 30 mcg/0.3 mL injection Inject into the muscle.      predniSONE (DELTASONE) 5 MG tablet Take 1 tablet (5 mg total) by mouth once daily. 90 tablet 3    pulse oximeter (PULSE OXIMETER) device by Apply Externally route 2 (two) times a day. Use twice daily at 8 AM and 3 PM and record the value in MyChart as directed. 1 each 0    sevelamer carbonate (RENVELA) 800 mg Tab Take 1 tablet (800 mg total) by mouth 3 (three) times daily with meals. 90 tablet 11    SPIKEVAX 1392-5776,12Y UP,,PF, 50 mcg/0.5 mL injection       tacrolimus (PROGRAF) 1 MG Cap Take 3 capsules (3 mg total) by mouth every 12 (twelve) hours for 60 days, THEN 2 capsules (2 mg total) every 12 (twelve) hours for 60 days, THEN 1 capsule (1 mg total) every 12 (twelve) hours for 60 days, THEN 1 capsule (1 mg total) once daily. 540 capsule 4    tiotropium bromide (SPIRIVA RESPIMAT) 1.25 mcg/actuation inhaler Inhale 2 puffs into the lungs once daily. Controller. Use this inhaler every day. 4 g 11     No current facility-administered medications on file prior to visit.     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     Patient is currently retired   Substance Use Topics    Alcohol use: No     Family History:     Family History   Problem Relation Age of Onset    No Known Problems Sister     No Known Problems Brother     Thyroid disease Mother         s/p surgery    Heart disease Father         had pacemaker    No Known Problems Sister     Kidney failure Sister   "   Kidney disease Sister     No Known Problems Sister     Kidney disease Brother     Kidney failure Brother         s/p transplant    Diabetes Mellitus Neg Hx     Stroke Neg Hx     Heart attack Neg Hx     Cancer Neg Hx     Celiac disease Neg Hx     Cirrhosis Neg Hx     Colon cancer Neg Hx     Esophageal cancer Neg Hx     Inflammatory bowel disease Neg Hx     Rectal cancer Neg Hx     Stomach cancer Neg Hx     Ulcerative colitis Neg Hx     Liver disease Neg Hx     Liver cancer Neg Hx     Crohn's disease Neg Hx     Melanoma Neg Hx      Physical Exam:   /63   Pulse 65   Ht 6' 1" (1.854 m)   Wt 84.6 kg (186 lb 8.2 oz)   SpO2 100%   BMI 24.61 kg/m²      Physical Exam  Vitals and nursing note reviewed.   Constitutional:       Appearance: Normal appearance.   HENT:      Head: Normocephalic and atraumatic.   Neck:      Vascular: Normal carotid pulses. No carotid bruit or hepatojugular reflux.   Cardiovascular:      Rate and Rhythm: Normal rate and regular rhythm.      Chest Wall: PMI is not displaced.      Pulses:           Radial pulses are 2+ on the right side and 2+ on the left side.        Dorsalis pedis pulses are 2+ on the right side and 2+ on the left side.        Posterior tibial pulses are 2+ on the right side and 2+ on the left side.      Heart sounds: Murmur heard.      Harsh midsystolic murmur is present with a grade of 1/6 at the upper right sternal border radiating to the neck.      Comments: Right groin incision w/o dehiscence or erythema   Pulmonary:      Effort: Pulmonary effort is normal.      Breath sounds: Normal breath sounds. No wheezing, rhonchi or rales.   Abdominal:      General: Bowel sounds are normal. There is no abdominal bruit.      Palpations: Abdomen is soft. There is no pulsatile mass.      Tenderness: There is no abdominal tenderness.   Feet:      Right foot:      Skin integrity: Skin integrity normal.      Left foot:      Skin integrity: Skin integrity normal.   Skin:     " Capillary Refill: Capillary refill takes less than 2 seconds.   Neurological:      General: No focal deficit present.      Mental Status: He is alert.   Psychiatric:         Mood and Affect: Mood and affect normal.         Speech: Speech normal.         Behavior: Behavior is cooperative.         Thought Content: Thought content normal.          Labs:     Blood Tests:  Lab Results   Component Value Date    BNP 1,929 (H) 02/23/2024     03/02/2024    K 3.7 03/02/2024     03/02/2024    CO2 25 03/02/2024    BUN 55 (H) 03/02/2024    CREATININE 6.6 (H) 03/02/2024    GLU 93 03/02/2024    HGBA1C 5.0 11/23/2023    MG 1.9 03/02/2024    AST 7 (L) 02/23/2024    ALT 19 02/23/2024    ALBUMIN 2.9 (L) 02/25/2024    PROT 6.1 02/23/2024    BILITOT 0.5 02/23/2024    WBC 12.00 03/02/2024    HGB 8.0 (L) 03/02/2024    HCT 25.4 (L) 03/02/2024    HCT 22 (L) 02/27/2023    MCV 85 03/02/2024     03/02/2024    INR 1.2 02/25/2024    TSH 1.085 02/06/2024       Lab Results   Component Value Date    CHOL 152 01/04/2023    HDL 47 01/04/2023    TRIG 88 01/04/2023       Lab Results   Component Value Date    LDLCALC 87.4 01/04/2023         Imaging:     Echocardiogram  TTE 2/26/2024    Left Ventricle: There is normal systolic function with a visually estimated ejection fraction of 60 - 65%. Grade II diastolic dysfunction. Elevated left ventricular filling pressure.    Right Ventricle: Normal right ventricular cavity size. Wall thickness is normal. Right ventricle wall motion  is normal. Systolic function is normal.    Left Atrium: Left atrium is moderately dilated.    Right Atrium: Right atrium is moderately dilated.    Aortic Valve: The aortic valve is a trileaflet valve. There is moderate aortic valve sclerosis. There is moderate annular calcification present. There is mild to moderate aortic regurgitation. There appers to be mobile echodensoty attached to the base of the non coronary leaflet that is likely nodular calcificaiton and  is tthe site of origin of Aortic regurgitation. This appears unchanged from pror TTE. AV leaflets appears thickened in some views cannot exlude presence of endocarditis (clip # 5).    Aortic Valve: Mildly restricted motion. There is mild stenosis. Aortic valve area by VTI is 1.62 cm². Aortic valve peak velocity is 2.62 m/s. Mean gradient is 14 mmHg. The dimensionless index is 0.47.    Mitral Valve: There is moderate mitral annular calcification present. There is mild to moderate regurgitation.    Aorta: Aortic root is normal in size measuring 3.61 cm. Ascending aorta is normal measuring 2.80 cm.    Pulmonary Artery: The estimated pulmonary artery systolic pressure is 49 mmHg.    IVC/SVC: Intermediate venous pressure at 8 mmHg.    Stress testing  SPECT stress test 4/28/2022    Normal myocardial perfusion scan. There is no evidence of myocardial ischemia or infarction.    The visually estimated ejection fraction is normal at rest and normal during stress.    There is normal wall motion at rest and post stress.    LV cavity size is normal at rest and normal at stress.    The EKG portion of this study is abnormal but not diagnostic.    The patient reported no chest pain during the stress test.    There were no arrhythmias during stress.    When compared to the previous study from 6/29/2020, there are no significant changes.    Cath Lab  [facility] 2/28/2024    The Mid RCA lesion was 80% stenosed with 0% stenosis post-intervention.    The Prox RCA lesion was 99% stenosed with 0% stenosis post-intervention.    Prox RCA lesion: A .    Prox RCA lesion, Mid RCA lesion: A .    Prox RCA lesion, Mid RCA lesion: A STENT SYNERGY XD [id number] stent was successfully placed.    Mid RCA lesion: A STENT SYNERGY XD 2.5X8MM stent was successfully placed.    The estimated blood loss was none.    Assessment:     1. Coronary artery disease involving native coronary artery of native heart without angina pectoris    2. H/O heart artery stent    3.  Aortic atherosclerosis    4. Mixed hyperlipidemia    5. Paroxysmal atrial fibrillation    6. Long term (current) use of anticoagulants [V58.61]    7. Carotid artery disease, unspecified laterality, unspecified type    8. Primary hypertension    9. Chronic heart failure with preserved ejection fraction    10. H/O kidney transplant    11. ESRD (end stage renal disease)    12. Moderate aortic stenosis        Plan:     Coronary artery disease involving native coronary artery of native heart without angina pectoris  H/O heart artery stent  Aortic atherosclerosis  Check lipid panel in 6 weeks   Continue atorvastatin 80 mg qD  ASA, plavix and eliquis x 1 month, then plavix and eliquis indefinitely thereafter    Mixed hyperlipidemia  Check FLP in 6 weeks     Paroxysmal atrial fibrillation  Long term (current) use of anticoagulants [V58.61]  Continue eliquis and amiodarone   Follows with Dr. Parmar     Carotid artery disease, unspecified laterality, unspecified type  Bilateral stenosis 1-39% per carotid US 7/2023  Continue plavix and statin   Continue to monitor   Recommend smoking cessation     Primary hypertension  Well controlled, continue current meds     Chronic heart failure with preserved ejection fraction  Currently euvolemic and well compensated    H/O kidney transplant  ESRD (end stage renal disease)  Follows with nephrology     Moderate aortic stenosis  Repeat echo annually      Signed:  Amanda Jackson PA-C  Cardiology     3/4/2024 8:48 AM    Follow-up:     Future Appointments   Date Time Provider Department Center   3/11/2024 10:00 AM Emre Latif MD [facility] NEPH [place]   3/12/2024 11:20 AM PRE-ADMIT NURSE 1, ENDO [facility]   3/15/2024  9:30 AM Anatoliy Colindres MD [facility]   4/2/2024 10:30 AM Emre Latif MD [facility] NEPH [place]   4/12/2024  8:10 AM [facility]   8/2/2024  9:00 AM Anatoliy Colindres MD  [facility]

## 2024-03-05 RX ORDER — ATORVASTATIN CALCIUM 80 MG/1
80 TABLET, FILM COATED ORAL DAILY
Qty: 90 TABLET | Refills: 3 | Status: SHIPPED | OUTPATIENT
Start: 2024-03-05

## 2024-03-13 ENCOUNTER — PATIENT MESSAGE (OUTPATIENT)
Dept: NEPHROLOGY | Facility: CLINIC | Age: 69
End: 2024-03-13
Payer: MEDICARE

## 2024-03-15 ENCOUNTER — OFFICE VISIT (OUTPATIENT)
Dept: INTERNAL MEDICINE | Facility: CLINIC | Age: 69
End: 2024-03-15
Payer: MEDICARE

## 2024-03-15 VITALS
OXYGEN SATURATION: 99 % | WEIGHT: 188.69 LBS | BODY MASS INDEX: 24.9 KG/M2 | DIASTOLIC BLOOD PRESSURE: 65 MMHG | HEART RATE: 67 BPM | SYSTOLIC BLOOD PRESSURE: 128 MMHG

## 2024-03-15 DIAGNOSIS — Z79.899 IMMUNODEFICIENCY DUE TO TREATMENT WITH IMMUNOSUPPRESSIVE MEDICATION: ICD-10-CM

## 2024-03-15 DIAGNOSIS — G47.01 INSOMNIA DUE TO MEDICAL CONDITION: ICD-10-CM

## 2024-03-15 DIAGNOSIS — J84.10 CALCIFIED GRANULOMA OF LUNG: ICD-10-CM

## 2024-03-15 DIAGNOSIS — D84.821 IMMUNODEFICIENCY DUE TO TREATMENT WITH IMMUNOSUPPRESSIVE MEDICATION: ICD-10-CM

## 2024-03-15 DIAGNOSIS — I13.0 HYPERTENSIVE HEART AND RENAL DISEASE WITH RENAL FAILURE, STAGE 1 THROUGH STAGE 4 OR UNSPECIFIED CHRONIC KIDNEY DISEASE, WITH HEART FAILURE: ICD-10-CM

## 2024-03-15 DIAGNOSIS — N18.5 CKD (CHRONIC KIDNEY DISEASE) STAGE 5, GFR LESS THAN 15 ML/MIN: Primary | ICD-10-CM

## 2024-03-15 DIAGNOSIS — I21.4 NSTEMI (NON-ST ELEVATED MYOCARDIAL INFARCTION): ICD-10-CM

## 2024-03-15 PROBLEM — J98.4 CALCIFIED GRANULOMA OF LUNG: Status: ACTIVE | Noted: 2024-03-15

## 2024-03-15 PROCEDURE — 1160F RVW MEDS BY RX/DR IN RCRD: CPT | Mod: CPTII,S$GLB,, | Performed by: STUDENT IN AN ORGANIZED HEALTH CARE EDUCATION/TRAINING PROGRAM

## 2024-03-15 PROCEDURE — 3288F FALL RISK ASSESSMENT DOCD: CPT | Mod: CPTII,S$GLB,, | Performed by: STUDENT IN AN ORGANIZED HEALTH CARE EDUCATION/TRAINING PROGRAM

## 2024-03-15 PROCEDURE — 3078F DIAST BP <80 MM HG: CPT | Mod: CPTII,S$GLB,, | Performed by: STUDENT IN AN ORGANIZED HEALTH CARE EDUCATION/TRAINING PROGRAM

## 2024-03-15 PROCEDURE — 1101F PT FALLS ASSESS-DOCD LE1/YR: CPT | Mod: CPTII,S$GLB,, | Performed by: STUDENT IN AN ORGANIZED HEALTH CARE EDUCATION/TRAINING PROGRAM

## 2024-03-15 PROCEDURE — 1111F DSCHRG MED/CURRENT MED MERGE: CPT | Mod: CPTII,S$GLB,, | Performed by: STUDENT IN AN ORGANIZED HEALTH CARE EDUCATION/TRAINING PROGRAM

## 2024-03-15 PROCEDURE — 99214 OFFICE O/P EST MOD 30 MIN: CPT | Mod: S$GLB,,, | Performed by: STUDENT IN AN ORGANIZED HEALTH CARE EDUCATION/TRAINING PROGRAM

## 2024-03-15 PROCEDURE — 3008F BODY MASS INDEX DOCD: CPT | Mod: CPTII,S$GLB,, | Performed by: STUDENT IN AN ORGANIZED HEALTH CARE EDUCATION/TRAINING PROGRAM

## 2024-03-15 PROCEDURE — 99999 PR PBB SHADOW E&M-EST. PATIENT-LVL V: CPT | Mod: PBBFAC,,, | Performed by: STUDENT IN AN ORGANIZED HEALTH CARE EDUCATION/TRAINING PROGRAM

## 2024-03-15 PROCEDURE — 3066F NEPHROPATHY DOC TX: CPT | Mod: CPTII,S$GLB,, | Performed by: STUDENT IN AN ORGANIZED HEALTH CARE EDUCATION/TRAINING PROGRAM

## 2024-03-15 PROCEDURE — 1159F MED LIST DOCD IN RCRD: CPT | Mod: CPTII,S$GLB,, | Performed by: STUDENT IN AN ORGANIZED HEALTH CARE EDUCATION/TRAINING PROGRAM

## 2024-03-15 PROCEDURE — 3074F SYST BP LT 130 MM HG: CPT | Mod: CPTII,S$GLB,, | Performed by: STUDENT IN AN ORGANIZED HEALTH CARE EDUCATION/TRAINING PROGRAM

## 2024-03-15 RX ORDER — TEMAZEPAM 7.5 MG/1
7.5 CAPSULE ORAL NIGHTLY PRN
Qty: 30 CAPSULE | Refills: 5 | Status: SHIPPED | OUTPATIENT
Start: 2024-03-15 | End: 2024-03-21

## 2024-03-15 NOTE — PROGRESS NOTES
SUBJECTIVE     Chief Complaint   Patient presents with    Follow-up       HPI  Ibrahima Phelps is a 68 y.o. male with  ESRD s/p kidney transplant x2 (1992, 2005) on chronic immunosuppressive medication, secondary hyperparathyroidism, metabolic bone disease, CAD s/p PCI (2006), aortic atherosclerosis, HTN, HLD, Chronic diastolic CHF, tachycardia-bradycardia syndrome with periods of paroxysmal atrial fibrillation, thrombocytopenia, prediabetes, hypothyroidism  that presents for follow-up. LOV 2/2/23.     Accompanied by wife today.     Since last visit, suffered NSTEMI, underwent PCI with stent plasment. On ASA, Plavix, Eliquis with plans to discontinue ASA after a month of treatment and continuing plavis & eliquis indefinitely. Also tolerating higher dose of Atorvastatin 80 mg qd. No recent chest pain since his discharge from the hospital.     Only concern today is that he will having shakiness and weakness after each session of dialysis. Does not feel presyncopal or have SOB. Reports he will be establishing with new nephrologist soon.     Still having difficulty sleeping. Has RLS, recent iron study showing low iron. Has been on iron supplementation. Frequently wakes up. Had been on trazodone, but was discontinued during recent hospitalization. Had taken Temazepam in the past, asking to go back on it.       PAST MEDICAL HISTORY:  Past Medical History:   Diagnosis Date    Atrial fibrillation     CAD (coronary artery disease) 2006    MI in 2006    CHF (congestive heart failure) 2017    CKD (chronic kidney disease) stage 3, GFR 30-59 ml/min     Dr. Latif.  in transplanted kidney    CKD (chronic kidney disease) stage 5, GFR less than 15 ml/min 02/02/2024    COVID-19 02/07/2023    Diverticulosis     Hyperlipidemia     Hypertension     Keloid cicatrix     Metabolic bone disease     Other emphysema 10/01/2019    Pericarditis     S/P kidney transplant 1992    x2 (1992 & 2005),    Thrombocytopenia     Thyroid disease      "Tobacco use 09/05/2014       PAST SURGICAL HISTORY:  Past Surgical History:   Procedure Laterality Date    AV FISTULA PLACEMENT Left 12/18/2023    Procedure: CREATION, AV FISTULA;  Surgeon: JUANI Melendez III, MD;  Location: [facility] OR [facility] FLR;  Service: Vascular;  Laterality: Left;  LUE AVF creation vs AVG insertion    CARDIOVERSION  04/30/15    CHOLECYSTECTOMY      COLONOSCOPY  April 20, 2011    Diverticulosis, repeat recommended in 3 yrs., repeat colonoscopy 2014 revealed 2 polyps.  He should return in 5 years.    COLONOSCOPY N/A 3/13/2020    Procedure: COLONOSCOPY;  Surgeon: Chon Casper MD;  Location: [facility] ENDO (4TH FLR);  Service: Endoscopy;  Laterality: N/A;  ok to hold coumadin x5days-see telephone encounter 2/4/20-tb    COLONOSCOPY N/A 2/4/2021    Procedure: COLONOSCOPY;  Surgeon: Chon Casper MD;  Location: [facility] ENDO (4TH FLR);  Service: Endoscopy;  Laterality: N/A;  Eliquis - per Dr. GAVIN Blunt ok to hold x 2 days and "restarted asap"- ERW  last prep "poor", eph2016 ordered/ Pt requests Dr. Casper  prep ins. mailed - COVID screening on 2/1/21 PCW- ERW    COLONOSCOPY N/A 3/3/2021    Procedure: COLONOSCOPY;  Surgeon: Chon Casper MD;  Location: [facility] ENDO (4TH FLR);  Service: Endoscopy;  Laterality: N/A;  Patient with his second poor bowel pre and has poor prep today.  If patient not intersted or can't get colonoscopy tomorrow will need constipation bowel prep and will need to restart his Eliquis today.Thanks,Chon Blunt-ok to hold Eliquis 2 days prior      COVID     CORONARY ANGIOGRAPHY N/A 2/28/2024    Procedure: ANGIOGRAM, CORONARY ARTERY;  Surgeon: Tylor Lincoln MD;  Location: [facility] CATH LAB;  Service: Cardiology;  Laterality: N/A;    CORONARY ANGIOPLASTY WITH STENT PLACEMENT  9/13/2006    FISTULOGRAM N/A 2/19/2024    Procedure: Fistulogram;  Surgeon: JUANI Melendez III, MD;  Location: [facility] CATH LAB;  Service: Peripheral Vascular;  Laterality: N/A;    IRRIGATION AND " DEBRIDEMENT Right 8/30/2023    Procedure: IRRIGATION AND DEBRIDEMENT, RIGHT MIDDLE FINGER;  Surgeon: Martin Larsen MD;  Location: [facility] OR [address] [place] [address];  Service: Orthopedics;  Laterality: Right;    KIDNEY TRANSPLANT  2005    LEFT HEART CATHETERIZATION N/A 2/26/2024    Procedure: Left heart cath;  Surgeon: Tylor Lincoln MD;  Location: [facility] CATH LAB;  Service: Cardiology;  Laterality: N/A;    PARATHYROIDECTOMY      PERCUTANEOUS TRANSLUMINAL ANGIOPLASTY OF ARTERIOVENOUS FISTULA Left 2/19/2024    Procedure: PTA, AV FISTULA;  Surgeon: JUANI Melendez III, MD;  Location: [facility] CATH LAB;  Service: Peripheral Vascular;  Laterality: Left;    STENT, DRUG ELUTING, SINGLE VESSEL, CORONARY N/A 2/28/2024    Procedure: Stent, Drug Eluting, Single Vessel, Coronary;  Surgeon: Tylor Lincoln MD;  Location: [facility] CATH LAB;  Service: Cardiology;  Laterality: N/A;    TREATMENT OF CARDIAC ARRHYTHMIA N/A 3/28/2022    Procedure: CARDIOVERSION;  Surgeon: Migue Blunt MD;  Location: [facility] EP LAB;  Service: Cardiology;  Laterality: N/A;  AF, DCC, MAC, GP, 3 PREP*RODRIGO deferred pt 100% compliant*       FAMILY HISTORY:  Family History   Problem Relation Age of Onset    No Known Problems Sister     No Known Problems Brother     Thyroid disease Mother         s/p surgery    Heart disease Father         had pacemaker    No Known Problems Sister     Kidney failure Sister     Kidney disease Sister     No Known Problems Sister     Kidney disease Brother     Kidney failure Brother         s/p transplant    Diabetes Mellitus Neg Hx     Stroke Neg Hx     Heart attack Neg Hx     Cancer Neg Hx     Celiac disease Neg Hx     Cirrhosis Neg Hx     Colon cancer Neg Hx     Esophageal cancer Neg Hx     Inflammatory bowel disease Neg Hx     Rectal cancer Neg Hx     Stomach cancer Neg Hx     Ulcerative colitis Neg Hx     Liver disease Neg Hx     Liver cancer Neg Hx     Crohn's disease Neg Hx     Melanoma Neg Hx        ALLERGIES AND MEDICATIONS:  updated and reviewed.  Review of patient's allergies indicates:   Allergen Reactions    Ace inhibitors Swelling    Verapamil Other (See Comments)     Other reaction(s): Unknown    Povidone-iodine Itching     Current Outpatient Medications   Medication Sig Dispense Refill    acetaminophen (TYLENOL) 500 MG tablet Take 1 tablet (500 mg total) by mouth every 6 (six) hours as needed for Pain. 20 tablet 0    albuterol (VENTOLIN HFA) 90 mcg/actuation inhaler Inhale 2 puffs into the lungs every 6 (six) hours as needed for Wheezing or Shortness of Breath. Rescue 18 g 3    albuterol-ipratropium (DUO-NEB) 2.5 mg-0.5 mg/3 mL nebulizer solution Take 3 mLs by nebulization every 6 (six) hours as needed for Wheezing. Rescue. Can be used in place of albuterol inhaler. 75 mL 0    amiodarone (PACERONE) 200 MG Tab Take 1 tablet (200 mg total) by mouth once daily. 30 tablet 11    apixaban (ELIQUIS) 5 mg Tab Take 1 tablet (5 mg total) by mouth 2 (two) times daily. 180 tablet 3    aspirin (ECOTRIN) 81 MG EC tablet Take 1 tablet (81 mg total) by mouth once daily. 30 tablet 0    atorvastatin (LIPITOR) 80 MG tablet Take 1 tablet (80 mg total) by mouth once daily. 90 tablet 3    b complex vitamins (B COMPLEX-VITAMIN B12) tablet Take 1 tablet by mouth once daily. 90 tablet 3    calcium carbonate (CALCIUM 600 ORAL) Take 1 tablet by mouth 2 (two) times a day.      CHOLECALCIFEROL, VITAMIN D3, ORAL Take 2 tablets by mouth 2 (two) times daily.      clopidogreL (PLAVIX) 75 mg tablet Take 1 tablet (75 mg total) by mouth once daily. 30 tablet 11    doxazosin (CARDURA) 4 MG tablet Take 1 tablet (4 mg total) by mouth every 12 (twelve) hours. 180 tablet 3    famotidine (PEPCID) 20 MG tablet Take 1 tablet (20 mg total) by mouth 2 (two) times daily. 180 tablet 3    fexofenadine HCl (ALLEGRA ORAL) Take 1 tablet by mouth daily as needed (allergies).      fluticasone furoate-vilanteroL (BREO) 100-25 mcg/dose diskus inhaler Inhale 1 puff into the lungs once  daily. Controller. Use this inhaler every day.      fluticasone propionate (FLONASE) 50 mcg/actuation nasal spray 1 spray (50 mcg total) by Each Nostril route daily as needed for Allergies.      FLUZONE HIGHDOSE QUAD 23-24  mcg/0.7 mL Syrg       furosemide (LASIX) 20 MG tablet Take 4 tablets (80 mg total) by mouth 2 (two) times daily as needed. For worsening LE swelling or shortness of breath on non-dialysis days 60 tablet 11    gabapentin (NEURONTIN) 100 MG capsule Take 1 capsule (100 mg total) by mouth as needed (pain).      hydrALAZINE (APRESOLINE) 50 MG tablet TAKE 1 TABLET (50 MG TOTAL) BY MOUTH 3 (THREE) TIMES DAILY. 270 tablet 3    HYDROcodone-acetaminophen (NORCO) 5-325 mg per tablet Take 1 tablet by mouth every 6 (six) hours as needed for Pain. 15 tablet 0    metoprolol succinate (TOPROL-XL) 25 MG 24 hr tablet Take 0.5 tablets (12.5 mg total) by mouth once daily. 15 tablet 11    MITIGARE 0.6 mg Cap TAKE 1 CAPSULE BY MOUTH TWICE A DAY AS NEEDED GOUT FLARE UP TO 3 DAYS 15 capsule 11    multivitamin (THERAGRAN) per tablet Take 1 tablet by mouth Daily.      NIFEdipine (PROCARDIA-XL) 60 MG (OSM) 24 hr tablet Take 1 tablet (60 mg total) by mouth 2 (two) times a day. 180 tablet 3    nitroGLYCERIN (NITROSTAT) 0.4 MG SL tablet Place 1 tablet (0.4 mg total) under the tongue every 5 (five) minutes as needed for Chest pain. 25 tablet 0    omeprazole (PRILOSEC) 20 MG capsule Take 1 capsule (20 mg total) by mouth daily as needed (heartburn).      paricalcitoL (ZEMPLAR) 1 MCG capsule TAKE 1 CAPSULE ON MONDAY, WEDNESDAY AND FRIDAY 36 capsule 3    PFIZER COVID BIVAL,12Y UP,,PF, 30 mcg/0.3 mL injection Inject into the muscle.      predniSONE (DELTASONE) 5 MG tablet Take 1 tablet (5 mg total) by mouth once daily. 90 tablet 3    pulse oximeter (PULSE OXIMETER) device by Apply Externally route 2 (two) times a day. Use twice daily at 8 AM and 3 PM and [facility]. 1 each 0    sevelamer carbonate  (RENVELA) 800 mg Tab Take 1 tablet (800 mg total) by mouth 3 (three) times daily with meals. 90 tablet 11    SPIKEVAX [id number],12Y UP,,PF, 50 mcg/0.5 mL injection       tacrolimus (PROGRAF) 1 MG Cap Take 3 capsules (3 mg total) by mouth every 12 (twelve) hours for 60 days, THEN 2 capsules (2 mg total) every 12 (twelve) hours for 60 days, THEN 1 capsule (1 mg total) every 12 (twelve) hours for 60 days, THEN 1 capsule (1 mg total) once daily. 540 capsule 4    tiotropium bromide (SPIRIVA RESPIMAT) 1.25 mcg/actuation inhaler Inhale 2 puffs into the lungs once daily. Controller. Use this inhaler every day. 4 g 11     No current facility-administered medications for this visit.       ROS  Review of Systems   Constitutional:  Negative for activity change, chills and fever.   HENT:  Negative for congestion and hearing loss.    Eyes:  Negative for pain and visual disturbance.   Respiratory:  Negative for cough and shortness of breath.    Cardiovascular:  Negative for chest pain and palpitations.   Gastrointestinal:  Negative for abdominal pain, constipation, diarrhea, nausea and vomiting.   Endocrine: Negative.    Genitourinary: Negative.    Musculoskeletal:  Negative for arthralgias and myalgias.   Skin: Negative.    Allergic/Immunologic: Negative.    Neurological:  Negative for dizziness, light-headedness and headaches.   Hematological: Negative.    Psychiatric/Behavioral:  Positive for sleep disturbance.          OBJECTIVE     Physical Exam  Vitals:    03/15/24 0921   BP: 128/65   Pulse: 67    Body mass index is 24.9 kg/m².            Physical Exam  Vitals reviewed.   Constitutional:       General: He is not in acute distress.     Appearance: Normal appearance.   HENT:      Head: Normocephalic and atraumatic.      Mouth/Throat:      Mouth: Mucous membranes are moist.      Pharynx: Oropharynx is clear.   Eyes:      Extraocular Movements: Extraocular movements intact.      Conjunctiva/sclera: Conjunctivae normal.       Pupils: Pupils are equal, round, and reactive to light.   Cardiovascular:      Rate and Rhythm: Normal rate and regular rhythm.      Pulses: Normal pulses.      Heart sounds: Normal heart sounds.   Pulmonary:      Effort: Pulmonary effort is normal.      Breath sounds: Normal breath sounds.   Abdominal:      General: There is no distension.   Musculoskeletal:         General: Normal range of motion.      Cervical back: Normal range of motion and neck supple.   Skin:     General: Skin is warm and dry.   Neurological:      General: No focal deficit present.      Mental Status: He is alert.           Health Maintenance         Date Due Completion Date    Colorectal Cancer Screening 03/03/2023 3/3/2021    LDCT Lung Screen 10/13/2024 10/13/2023    Hemoglobin A1c (Prediabetes) 11/23/2024 11/23/2023    PROSTATE-SPECIFIC ANTIGEN 02/06/2025 2/6/2024    Lipid Panel 01/04/2028 1/4/2023    TETANUS VACCINE 03/12/2028 3/12/2018              ASSESSMENT     68 y.o. male with     1. CKD (chronic kidney disease) stage 5, GFR less than 15 ml/min    2. Hypertensive heart and renal disease with renal failure, stage 1 through stage 4 or unspecified chronic kidney disease, with heart failure    3. Immunodeficiency due to treatment with immunosuppressive medication    4. Insomnia due to medical condition    5. NSTEMI (non-ST elevated myocardial infarction)    6. Calcified granuloma of lung    7. Primary hypertension        PLAN:     1. CKD (chronic kidney disease) stage 5, GFR less than 15 ml/min  - Recently started on HD T/Th/S.   - Continue follow up with Nephrology.     2. Hypertensive heart and renal disease with renal failure, stage 1 through stage 4 or unspecified chronic kidney disease, with heart failure  - Controlled on current regimen. Continue.     3. Immunodeficiency due to treatment with immunosuppressive medication  - Due to renal transplant. Continue follow up with transplant team.     4. Insomnia due to medical condition  -   reviewed. Can restart Temazepam.   - temazepam (RESTORIL) 7.5 MG Cap; Take 1 capsule (7.5 mg total) by mouth nightly as needed (insomnia).  Dispense: 30 capsule; Refill: 5    5. NSTEMI (non-ST elevated myocardial infarction)  - Now s/p PCI. Continue follow up with Cardiology.     6. Calcified granuloma of lung  - Noted on CT chest without contrast on 10/13/23. Stable. Continue to monitor.           RTC in 6 months, earlier PRN       Anatoliy Colindres MD  Family Medicine  [facility]  03/15/2024    This document was created using voice recognition software (M*PanX Fluency Direct). Although it may be edited, this document may contain errors related to incorrect recognition of the spoken word. Please call the physician if clarification is needed.       No follow-ups on file.

## 2024-03-19 ENCOUNTER — PATIENT MESSAGE (OUTPATIENT)
Dept: INTERNAL MEDICINE | Facility: CLINIC | Age: 69
End: 2024-03-19
Payer: MEDICARE

## 2024-03-19 NOTE — TELEPHONE ENCOUNTER
PA needed for Temazepam medication.     Disp Refills Start End JOEY   temazepam (RESTORIL) 7.5 MG Cap 30 capsule 5 3/15/2024 -- No   Sig - Route: Take 1 capsule (7.5 mg total) by mouth nightly as needed (insomnia). - Oral   Sent to pharmacy as: temazepam (RESTORIL) 7.5 MG Cap     Diagnoses  Insomnia due to medical condition [G47.01]

## 2024-03-21 ENCOUNTER — TELEPHONE (OUTPATIENT)
Dept: VASCULAR SURGERY | Facility: CLINIC | Age: 69
End: 2024-03-21
Payer: MEDICARE

## 2024-03-21 ENCOUNTER — PATIENT MESSAGE (OUTPATIENT)
Dept: NEPHROLOGY | Facility: CLINIC | Age: 69
End: 2024-03-21
Payer: MEDICARE

## 2024-03-21 RX ORDER — SUVOREXANT 5 MG/1
5 TABLET, FILM COATED ORAL NIGHTLY PRN
Qty: 30 TABLET | Refills: 2 | Status: SHIPPED | OUTPATIENT
Start: 2024-03-21

## 2024-03-21 NOTE — TELEPHONE ENCOUNTER
Patient requesting alternative to temazepam for insomnia. Due to age and comorbidities lending to additional risk from potential fall (anticoagulation and antiplatelet therapies) recommending Belsomra 5mg QHS PRN. Ramelteon may be another option if sleep onset is the primary concern for the patient, but it seems that this may not be covered as well. Doxepin 3 mg QHS PRN may be an alternative. May titrate up to <25mg - higher not recommended due to potential for anticholinergic activity increasing fall and pro-arrhythmic risk.     Requested Prescriptions     Pending Prescriptions Disp Refills    suvorexant (BELSOMRA) 5 mg Tab 30 tablet 2     Sig: Take 5 mg by mouth nightly as needed (insomnia).

## 2024-03-21 NOTE — TELEPHONE ENCOUNTER
Received call from nurse Ornelas at pt's dialysis center stating pt needs appt to eval AVF. Appointments scheduled, Ceci states she will make pt aware of appt.

## 2024-03-21 NOTE — TELEPHONE ENCOUNTER
----- Message from Lowell Ramirez sent at 3/21/2024  3:00 PM CDT -----  Regarding: PT Advice  Contact: [phone]  Pt wife Patti calling to speak with someone in provider office regarding appt on 3/22. States she would like to know if pt should stop drinking and eating  over night.  Please call pt back at  [phone]

## 2024-03-21 NOTE — TELEPHONE ENCOUNTER
Care Due:                  Date            Visit Type   Department     Provider  --------------------------------------------------------------------------------                                MYCHART                              FOLLOWUP/OF  [facility] INTERNAL  Last Visit: 03-      FICE VISIT   MEDICINE       Anatoliy WHITEHEAD Bernadine                              EP -                              PRIMARY      [facility] INTERNAL  Next Visit: 08-      CARE ([facility])   MEDICINE       Anatoliy WHITEHEAD Bernadine                                                            Last  Test          Frequency    Reason                     Performed    Due Date  --------------------------------------------------------------------------------    Lipid Panel.  12 months..  atorvastatin.............  01- 12-    [facility] Embedded Care Due Messages. Reference number: [id number].   3/21/2024 2:40:15 PM CDT

## 2024-03-21 NOTE — TELEPHONE ENCOUNTER
Attempted to contact patient in response to message. Voice message left for patient and wife explaining that patient does not need to fast and can take all regularly scheduled medications.   ----- Message from Lowell Ramirez sent at 3/21/2024  3:00 PM CDT -----  Regarding: PT Advice  Contact: [phone]  Pt wife Patti calling to speak with someone in provider office regarding appt on 3/22. States she would like to know if pt should stop drinking and eating  over night.  Please call pt back at  [phone]

## 2024-03-22 ENCOUNTER — HOSPITAL ENCOUNTER (OUTPATIENT)
Dept: VASCULAR SURGERY | Facility: CLINIC | Age: 69
Discharge: HOME | End: 2024-03-22
Attending: SURGERY
Payer: MEDICARE

## 2024-03-22 ENCOUNTER — OFFICE VISIT (OUTPATIENT)
Dept: VASCULAR SURGERY | Facility: CLINIC | Age: 69
End: 2024-03-22
Attending: SURGERY
Payer: MEDICARE

## 2024-03-22 VITALS
TEMPERATURE: 98 F | DIASTOLIC BLOOD PRESSURE: 57 MMHG | HEIGHT: 73 IN | SYSTOLIC BLOOD PRESSURE: 115 MMHG | HEART RATE: 67 BPM | BODY MASS INDEX: 24.83 KG/M2 | WEIGHT: 187.38 LBS

## 2024-03-22 DIAGNOSIS — Z99.2 ESRD (END STAGE RENAL DISEASE) ON DIALYSIS: ICD-10-CM

## 2024-03-22 DIAGNOSIS — N18.6 ESRD (END STAGE RENAL DISEASE) ON DIALYSIS: ICD-10-CM

## 2024-03-22 DIAGNOSIS — T82.858D STENOSIS OF ARTERIOVENOUS DIALYSIS FISTULA, SUBSEQUENT ENCOUNTER: Primary | ICD-10-CM

## 2024-03-22 PROBLEM — T82.858A STENOSIS OF ARTERIOVENOUS DIALYSIS FISTULA: Status: ACTIVE | Noted: 2024-03-22

## 2024-03-22 PROBLEM — N18.4 CKD (CHRONIC KIDNEY DISEASE), STAGE IV: Status: RESOLVED | Noted: 2023-10-13 | Resolved: 2024-03-22

## 2024-03-22 PROCEDURE — 3288F FALL RISK ASSESSMENT DOCD: CPT | Mod: CPTII,S$GLB,, | Performed by: SURGERY

## 2024-03-22 PROCEDURE — 1101F PT FALLS ASSESS-DOCD LE1/YR: CPT | Mod: CPTII,S$GLB,, | Performed by: SURGERY

## 2024-03-22 PROCEDURE — 1125F AMNT PAIN NOTED PAIN PRSNT: CPT | Mod: CPTII,S$GLB,, | Performed by: SURGERY

## 2024-03-22 PROCEDURE — 93990 DOPPLER FLOW TESTING: CPT | Mod: S$GLB,,, | Performed by: SURGERY

## 2024-03-22 PROCEDURE — 3066F NEPHROPATHY DOC TX: CPT | Mod: CPTII,S$GLB,, | Performed by: SURGERY

## 2024-03-22 PROCEDURE — 1111F DSCHRG MED/CURRENT MED MERGE: CPT | Mod: CPTII,S$GLB,, | Performed by: SURGERY

## 2024-03-22 PROCEDURE — 99999 PR PBB SHADOW E&M-EST. PATIENT-LVL V: CPT | Mod: PBBFAC,,, | Performed by: SURGERY

## 2024-03-22 PROCEDURE — 3074F SYST BP LT 130 MM HG: CPT | Mod: CPTII,S$GLB,, | Performed by: SURGERY

## 2024-03-22 PROCEDURE — 3008F BODY MASS INDEX DOCD: CPT | Mod: CPTII,S$GLB,, | Performed by: SURGERY

## 2024-03-22 PROCEDURE — 3078F DIAST BP <80 MM HG: CPT | Mod: CPTII,S$GLB,, | Performed by: SURGERY

## 2024-03-22 PROCEDURE — 1159F MED LIST DOCD IN RCRD: CPT | Mod: CPTII,S$GLB,, | Performed by: SURGERY

## 2024-03-22 PROCEDURE — 99214 OFFICE O/P EST MOD 30 MIN: CPT | Mod: S$GLB,,, | Performed by: SURGERY

## 2024-03-22 PROCEDURE — 1160F RVW MEDS BY RX/DR IN RCRD: CPT | Mod: CPTII,S$GLB,, | Performed by: SURGERY

## 2024-03-22 NOTE — PROGRESS NOTES
VASCULAR SURGERY SERVICE    REFERRING DOCTOR: Emre Latif MD    CHIEF COMPLAINT: CKD 5    HISTORY OF PRESENT ILLNESS: Ibrahima Phelps is a 68 y.o. male status post 2 kidney transplantations the last in 2005, now with the client renal function with a GFR of 9.7 not yet initiated hemodialysis.  He is right-handed.  He would a Veronica AV fistula in the left many many years ago.    S/p    PTA, left AV fistula 02/19/2024  left brachiocephalic AV fistula creation 12/18/2023    He is taking Eliquis for atrial fibrillation.    He is no history of AICD or pacemaker placement    02/06/2024:  This is initial follow-up visit status post left brachiocephalic AV fistula creation 12/18/2023.  He has not initiated hemodialysis.  Denies any hand pain weakness or numbness.  This fistula was felt likely that he had a outflow vein stenosis in the cephalic vein    03/22/2024:  This is postoperative appointment after angioplasty of his left AV fistula 1 month ago.  He subsequently initiated hemodialysis proximally 3 weeks ago.  He would 1 episode of infiltration last week but none since    Past Medical History:   Diagnosis Date    Atrial fibrillation     CAD (coronary artery disease) 2006    MI in 2006    CHF (congestive heart failure) 2017    CKD (chronic kidney disease) stage 3, GFR 30-59 ml/min     Dr. Latif.  in transplanted kidney    CKD (chronic kidney disease) stage 5, GFR less than 15 ml/min 02/02/2024    COVID-19 02/07/2023    Diverticulosis     Hyperlipidemia     Hypertension     Keloid cicatrix     Metabolic bone disease     Other emphysema 10/01/2019    Pericarditis     S/P kidney transplant 1992    x2 (1992 & 2005),    Thrombocytopenia     Thyroid disease     Tobacco use 09/05/2014       Past Surgical History:   Procedure Laterality Date    AV FISTULA PLACEMENT Left 12/18/2023    Procedure: CREATION, AV FISTULA;  Surgeon: JUANI Melendez III, MD;  Location: [facility] OR [address] [place] [address];  Service: Vascular;  Laterality: Left;   "LUE AVF creation vs AVG insertion    CARDIOVERSION  04/30/15    CHOLECYSTECTOMY      COLONOSCOPY  April 20, 2011    Diverticulosis, repeat recommended in 3 yrs., repeat colonoscopy 2014 revealed 2 polyps.  He should return in 5 years.    COLONOSCOPY N/A 3/13/2020    Procedure: COLONOSCOPY;  Surgeon: Chon Casper MD;  Location: [facility] ENDO (4TH FLR);  Service: Endoscopy;  Laterality: N/A;  ok to hold coumadin x5days-see telephone encounter 2/4/20-tb    COLONOSCOPY N/A 2/4/2021    Procedure: COLONOSCOPY;  Surgeon: Chon Casper MD;  Location: [facility] (4TH FLR);  Service: Endoscopy;  Laterality: N/A;  Eliquis - per Dr. GAVIN Blunt ok to hold x 2 days and "restarted asap"- ERW  last prep "poor", [id number] ordered/ Pt requests Dr. Casper  prep ins. mailed - COVID screening on 2/1/21 PCW- ERW    COLONOSCOPY N/A 3/3/2021    Procedure: COLONOSCOPY;  Surgeon: Chon Casper MD;  Location: [facility] (4TH FLR);  Service: Endoscopy;  Laterality: N/A;  Patient with his second poor bowel pre and has poor prep today.  If patient not intersted or can't get colonoscopy tomorrow will need constipation bowel prep and will need to restart his Eliquis today.Thanks,Chon     per Dr Blunt-ok to hold Eliquis 2 days prior      COVID     CORONARY ANGIOGRAPHY N/A 2/28/2024    Procedure: ANGIOGRAM, CORONARY ARTERY;  Surgeon: Tylor Lincoln MD;  Location: [facility] CATH LAB;  Service: Cardiology;  Laterality: N/A;    CORONARY ANGIOPLASTY WITH STENT PLACEMENT  9/13/2006    FISTULOGRAM N/A 2/19/2024    Procedure: Fistulogram;  Surgeon: JUANI Melendez III, MD;  Location: [facility] CATH LAB;  Service: Peripheral Vascular;  Laterality: N/A;    IRRIGATION AND DEBRIDEMENT Right 8/30/2023    Procedure: IRRIGATION AND DEBRIDEMENT, RIGHT MIDDLE FINGER;  Surgeon: Martin Larsen MD;  Location: [facility] OR 2ND FLR;  Service: Orthopedics;  Laterality: Right;    KIDNEY TRANSPLANT  2005    LEFT HEART CATHETERIZATION N/A 2/26/2024    Procedure: Left " heart cath;  Surgeon: Tylor Lincoln MD;  Location: [facility] CATH LAB;  Service: Cardiology;  Laterality: N/A;    PARATHYROIDECTOMY      PERCUTANEOUS TRANSLUMINAL ANGIOPLASTY OF ARTERIOVENOUS FISTULA Left 2/19/2024    Procedure: PTA, AV FISTULA;  Surgeon: JUANI Melendez III, MD;  Location: [facility] CATH LAB;  Service: Peripheral Vascular;  Laterality: Left;    STENT, DRUG ELUTING, SINGLE VESSEL, CORONARY N/A 2/28/2024    Procedure: Stent, Drug Eluting, Single Vessel, Coronary;  Surgeon: Tylor Lincoln MD;  Location: [facility] CATH LAB;  Service: Cardiology;  Laterality: N/A;    TREATMENT OF CARDIAC ARRHYTHMIA N/A 3/28/2022    Procedure: CARDIOVERSION;  Surgeon: Migue Blunt MD;  Location: [facility] EP LAB;  Service: Cardiology;  Laterality: N/A;  AF, DCC, MAC, GP, 3 PREP*RODRIGO deferred pt 100% compliant*         Current Outpatient Medications:     acetaminophen (TYLENOL) 500 MG tablet, Take 1 tablet (500 mg total) by mouth every 6 (six) hours as needed for Pain., Disp: 20 tablet, Rfl: 0    albuterol (VENTOLIN HFA) 90 mcg/actuation inhaler, Inhale 2 puffs into the lungs every 6 (six) hours as needed for Wheezing or Shortness of Breath. Rescue, Disp: 18 g, Rfl: 3    albuterol-ipratropium (DUO-NEB) 2.5 mg-0.5 mg/3 mL nebulizer solution, Take 3 mLs by nebulization every 6 (six) hours as needed for Wheezing. Rescue. Can be used in place of albuterol inhaler., Disp: 75 mL, Rfl: 0    amiodarone (PACERONE) 200 MG Tab, Take 1 tablet (200 mg total) by mouth once daily., Disp: 30 tablet, Rfl: 11    apixaban (ELIQUIS) 5 mg Tab, Take 1 tablet (5 mg total) by mouth 2 (two) times daily., Disp: 180 tablet, Rfl: 3    aspirin (ECOTRIN) 81 MG EC tablet, Take 1 tablet (81 mg total) by mouth once daily., Disp: 30 tablet, Rfl: 0    atorvastatin (LIPITOR) 80 MG tablet, Take 1 tablet (80 mg total) by mouth once daily., Disp: 90 tablet, Rfl: 3    b complex vitamins (B COMPLEX-VITAMIN B12) tablet, Take 1 tablet by mouth once daily., Disp: 90  tablet, Rfl: 3    calcium carbonate (CALCIUM 600 ORAL), Take 1 tablet by mouth 2 (two) times a day., Disp: , Rfl:     CHOLECALCIFEROL, VITAMIN D3, ORAL, Take 2 tablets by mouth 2 (two) times daily., Disp: , Rfl:     clopidogreL (PLAVIX) 75 mg tablet, Take 1 tablet (75 mg total) by mouth once daily., Disp: 30 tablet, Rfl: 11    doxazosin (CARDURA) 4 MG tablet, Take 1 tablet (4 mg total) by mouth every 12 (twelve) hours., Disp: 180 tablet, Rfl: 3    famotidine (PEPCID) 20 MG tablet, Take 1 tablet (20 mg total) by mouth 2 (two) times daily., Disp: 180 tablet, Rfl: 3    fexofenadine HCl (ALLEGRA ORAL), Take 1 tablet by mouth daily as needed (allergies)., Disp: , Rfl:     fluticasone furoate-vilanteroL (BREO) 100-25 mcg/dose diskus inhaler, Inhale 1 puff into the lungs once daily. Controller. Use this inhaler every day., Disp: , Rfl:     fluticasone propionate (FLONASE) 50 mcg/actuation nasal spray, 1 spray (50 mcg total) by Each Nostril route daily as needed for Allergies., Disp: , Rfl:     FLUZONE HIGHDOSE QUAD 23-24  mcg/0.7 mL Syrg, , Disp: , Rfl:     furosemide (LASIX) 20 MG tablet, Take 4 tablets (80 mg total) by mouth 2 (two) times daily as needed. For worsening LE swelling or shortness of breath on non-dialysis days, Disp: 60 tablet, Rfl: 11    gabapentin (NEURONTIN) 100 MG capsule, Take 1 capsule (100 mg total) by mouth as needed (pain)., Disp: , Rfl:     hydrALAZINE (APRESOLINE) 50 MG tablet, TAKE 1 TABLET (50 MG TOTAL) BY MOUTH 3 (THREE) TIMES DAILY., Disp: 270 tablet, Rfl: 3    HYDROcodone-acetaminophen (NORCO) 5-325 mg per tablet, Take 1 tablet by mouth every 6 (six) hours as needed for Pain., Disp: 15 tablet, Rfl: 0    metoprolol succinate (TOPROL-XL) 25 MG 24 hr tablet, Take 0.5 tablets (12.5 mg total) by mouth once daily., Disp: 15 tablet, Rfl: 11    MITIGARE 0.6 mg Cap, TAKE 1 CAPSULE BY MOUTH TWICE A DAY AS NEEDED GOUT FLARE UP TO 3 DAYS, Disp: 15 capsule, Rfl: 11    multivitamin (THERAGRAN) per  tablet, Take 1 tablet by mouth Daily., Disp: , Rfl:     NIFEdipine (PROCARDIA-XL) 60 MG (OSM) 24 hr tablet, Take 1 tablet (60 mg total) by mouth 2 (two) times a day., Disp: 180 tablet, Rfl: 3    nitroGLYCERIN (NITROSTAT) 0.4 MG SL tablet, Place 1 tablet (0.4 mg total) under the tongue every 5 (five) minutes as needed for Chest pain., Disp: 25 tablet, Rfl: 0    omeprazole (PRILOSEC) 20 MG capsule, Take 1 capsule (20 mg total) by mouth daily as needed (heartburn)., Disp: , Rfl:     paricalcitoL (ZEMPLAR) 1 MCG capsule, TAKE 1 CAPSULE ON MONDAY, WEDNESDAY AND FRIDAY, Disp: 36 capsule, Rfl: 3    PFIZER COVID BIVAL,12Y UP,,PF, 30 mcg/0.3 mL injection, Inject into the muscle., Disp: , Rfl:     predniSONE (DELTASONE) 5 MG tablet, Take 1 tablet (5 mg total) by mouth once daily., Disp: 90 tablet, Rfl: 3    pulse oximeter (PULSE OXIMETER) device, by Apply Externally route 2 (two) times a day. Use twice daily at 8 AM and 3 PM and record the value in MyChart as directed., Disp: 1 each, Rfl: 0    sevelamer carbonate (RENVELA) 800 mg Tab, Take 1 tablet (800 mg total) by mouth 3 (three) times daily with meals., Disp: 90 tablet, Rfl: 11    SPIKEVAX [id number],12Y UP,,PF, 50 mcg/0.5 mL injection, , Disp: , Rfl:     suvorexant (BELSOMRA) 5 mg Tab, Take 5 mg by mouth nightly as needed (insomnia)., Disp: 30 tablet, Rfl: 2    tacrolimus (PROGRAF) 1 MG Cap, Take 3 capsules (3 mg total) by mouth every 12 (twelve) hours for 60 days, THEN 2 capsules (2 mg total) every 12 (twelve) hours for 60 days, THEN 1 capsule (1 mg total) every 12 (twelve) hours for 60 days, THEN 1 capsule (1 mg total) once daily., Disp: 540 capsule, Rfl: 4    tiotropium bromide (SPIRIVA RESPIMAT) 1.25 mcg/actuation inhaler, Inhale 2 puffs into the lungs once daily. Controller. Use this inhaler every day., Disp: 4 g, Rfl: 11    Review of patient's allergies indicates:   Allergen Reactions    Ace inhibitors Swelling    Verapamil Other (See Comments)     Other reaction(s):  "Unknown    Povidone-iodine Itching       Family History   Problem Relation Age of Onset    No Known Problems Sister     No Known Problems Brother     Thyroid disease Mother         s/p surgery    Heart disease Father         had pacemaker    No Known Problems Sister     Kidney failure Sister     Kidney disease Sister     No Known Problems Sister     Kidney disease Brother     Kidney failure Brother         s/p transplant    Diabetes Mellitus Neg Hx     Stroke Neg Hx     Heart attack Neg Hx     Cancer Neg Hx     Celiac disease Neg Hx     Cirrhosis Neg Hx     Colon cancer Neg Hx     Esophageal cancer Neg Hx     Inflammatory bowel disease Neg Hx     Rectal cancer Neg Hx     Stomach cancer Neg Hx     Ulcerative colitis Neg Hx     Liver disease Neg Hx     Liver cancer Neg Hx     Crohn's disease Neg Hx     Melanoma Neg Hx        Social History     Tobacco Use    Smoking status: Every Day     Current packs/day: 0.00     Average packs/day: 0.5 packs/day for 40.0 years (20.0 ttl pk-yrs)     Types: Cigarettes     Start date: 1982     Last attempt to quit: 2022     Years since quittin.0    Smokeless tobacco: Never    Tobacco comments:     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     Patient is currently retired   Substance Use Topics    Alcohol use: No    Drug use: No       PHYSICAL EXAM:   BP (!) 115/57 (BP Location: Right arm, Patient Position: Sitting, BP Method: Medium (Automatic))   Pulse 67   Temp 97.8 °F (36.6 °C) (Oral)   Ht 6' 1" (1.854 m)   Wt 85 kg (187 lb 6.3 oz)   BMI 24.72 kg/m²   Constitutional:  Alert,   Well-appearing  In no distress.   Neurological: Normal speech  no focal findings  CN II - XII grossly intact.    Psychiatric: Mood and affect appropriate and symmetric.   HEENT: Normocephalic / atraumatic  PERRLA  Midline trachea  No scars across the neck   Cardiac: Regular rate and rhythm.   Pulmonary: Normal pulmonary effort.   Abdomen: Soft, not " distended.     Skin: Warm and well perfused.    Vascular:  Right radial pulse 1 to 2+, left brachial pulse 3 +   Extremities/  Musculoskeletal: Left arm shows a good thrill with minimal pulsatility of the fistula.  There was significant ecchymosis consistent with prior extravasation no skin thinning or ulceration     IMAGING:  Duplex of the duplex of the fistula today shows a para anastomotic stenosis, however flow volume is increased to 2.5 L (712 mL/min prior)    02/06/2024:  Duplex of the fistula shows several areas of stenosis of the worse peak velocity of 678.  Flow volume 712 mils per minute  Diameter 9.7 proximal, 8.7 mid, 10.0 distal  Diameter 1.2 proximal 2.0 mid, 1.4 distal       IMPRESSION:  Well matured now high flow AV fistula, status post a isolated episode of infiltration 1 week prior  No significant stenosis in the fistula by duplex today    PLAN:  Follow up in 4 months with AV fistula duplex, sooner if clinically indicated    INDIGO Melendez III, MD, FACS  Professor and Chief, Vascular and Endovascular Surgery

## 2024-03-23 ENCOUNTER — HOSPITAL ENCOUNTER (EMERGENCY)
Facility: HOSPITAL | Age: 69
Discharge: HOME | End: 2024-03-23
Attending: STUDENT IN AN ORGANIZED HEALTH CARE EDUCATION/TRAINING PROGRAM
Payer: MEDICARE

## 2024-03-23 VITALS
OXYGEN SATURATION: 100 % | RESPIRATION RATE: 16 BRPM | TEMPERATURE: 98 F | HEART RATE: 63 BPM | WEIGHT: 187.38 LBS | DIASTOLIC BLOOD PRESSURE: 56 MMHG | BODY MASS INDEX: 24.72 KG/M2 | SYSTOLIC BLOOD PRESSURE: 127 MMHG

## 2024-03-23 DIAGNOSIS — M79.672 LEFT FOOT PAIN: ICD-10-CM

## 2024-03-23 DIAGNOSIS — M25.572 ACUTE LEFT ANKLE PAIN: ICD-10-CM

## 2024-03-23 DIAGNOSIS — L03.116 CELLULITIS OF LEFT FOOT: Primary | ICD-10-CM

## 2024-03-23 LAB
ALBUMIN SERPL BCP-MCNC: 2.9 G/DL (ref 3.5–5.2)
ALP SERPL-CCNC: 68 U/L (ref 55–135)
ALT SERPL W/O P-5'-P-CCNC: 8 U/L (ref 10–44)
ANION GAP SERPL CALC-SCNC: 9 MMOL/L (ref 8–16)
AST SERPL-CCNC: 8 U/L (ref 10–40)
BASOPHILS # BLD AUTO: 0.01 K/UL (ref 0–0.2)
BASOPHILS NFR BLD: 0.2 % (ref 0–1.9)
BILIRUB SERPL-MCNC: 0.6 MG/DL (ref 0.1–1)
BUN SERPL-MCNC: 10 MG/DL (ref 8–23)
CALCIUM SERPL-MCNC: 7.7 MG/DL (ref 8.7–10.5)
CHLORIDE SERPL-SCNC: 107 MMOL/L (ref 95–110)
CO2 SERPL-SCNC: 23 MMOL/L (ref 23–29)
CREAT SERPL-MCNC: 2.9 MG/DL (ref 0.5–1.4)
CRP SERPL-MCNC: 43.9 MG/L (ref 0–8.2)
DIFFERENTIAL METHOD BLD: ABNORMAL
EOSINOPHIL # BLD AUTO: 0.1 K/UL (ref 0–0.5)
EOSINOPHIL NFR BLD: 2.1 % (ref 0–8)
ERYTHROCYTE [DISTWIDTH] IN BLOOD BY AUTOMATED COUNT: 17 % (ref 11.5–14.5)
EST. GFR  (NO RACE VARIABLE): 22.7 ML/MIN/1.73 M^2
GLUCOSE SERPL-MCNC: 96 MG/DL (ref 70–110)
HCT VFR BLD AUTO: 25.3 % (ref 40–54)
HGB BLD-MCNC: 7.8 G/DL (ref 14–18)
IMM GRANULOCYTES # BLD AUTO: 0.03 K/UL (ref 0–0.04)
IMM GRANULOCYTES NFR BLD AUTO: 0.5 % (ref 0–0.5)
LYMPHOCYTES # BLD AUTO: 1.1 K/UL (ref 1–4.8)
LYMPHOCYTES NFR BLD: 16.6 % (ref 18–48)
MCH RBC QN AUTO: 26.5 PG (ref 27–31)
MCHC RBC AUTO-ENTMCNC: 30.8 G/DL (ref 32–36)
MCV RBC AUTO: 86 FL (ref 82–98)
MONOCYTES # BLD AUTO: 1.1 K/UL (ref 0.3–1)
MONOCYTES NFR BLD: 15.9 % (ref 4–15)
NEUTROPHILS # BLD AUTO: 4.3 K/UL (ref 1.8–7.7)
NEUTROPHILS NFR BLD: 64.7 % (ref 38–73)
NRBC BLD-RTO: 0 /100 WBC
PLATELET # BLD AUTO: 177 K/UL (ref 150–450)
PMV BLD AUTO: 12.7 FL (ref 9.2–12.9)
POTASSIUM SERPL-SCNC: 5 MMOL/L (ref 3.5–5.1)
PROT SERPL-MCNC: 6.3 G/DL (ref 6–8.4)
RBC # BLD AUTO: 2.94 M/UL (ref 4.6–6.2)
SODIUM SERPL-SCNC: 139 MMOL/L (ref 136–145)
URATE SERPL-MCNC: 1.9 MG/DL (ref 3.4–7)
WBC # BLD AUTO: 6.61 K/UL (ref 3.9–12.7)

## 2024-03-23 PROCEDURE — 99284 EMERGENCY DEPT VISIT MOD MDM: CPT | Mod: 25

## 2024-03-23 PROCEDURE — 25000003 PHARM REV CODE 250: Performed by: PHYSICIAN ASSISTANT

## 2024-03-23 PROCEDURE — 80053 COMPREHEN METABOLIC PANEL: CPT | Performed by: PHYSICIAN ASSISTANT

## 2024-03-23 PROCEDURE — 84550 ASSAY OF BLOOD/URIC ACID: CPT | Performed by: PHYSICIAN ASSISTANT

## 2024-03-23 PROCEDURE — 85025 COMPLETE CBC W/AUTO DIFF WBC: CPT | Performed by: PHYSICIAN ASSISTANT

## 2024-03-23 PROCEDURE — 86140 C-REACTIVE PROTEIN: CPT | Performed by: PHYSICIAN ASSISTANT

## 2024-03-23 RX ORDER — HYDROCODONE BITARTRATE AND ACETAMINOPHEN 5; 325 MG/1; MG/1
1 TABLET ORAL
Status: COMPLETED | OUTPATIENT
Start: 2024-03-23 | End: 2024-03-23

## 2024-03-23 RX ORDER — DOXYCYCLINE HYCLATE 100 MG
100 TABLET ORAL
Status: COMPLETED | OUTPATIENT
Start: 2024-03-23 | End: 2024-03-23

## 2024-03-23 RX ORDER — HYDROCODONE BITARTRATE AND ACETAMINOPHEN 5; 325 MG/1; MG/1
1 TABLET ORAL EVERY 6 HOURS PRN
Qty: 12 TABLET | Refills: 0 | Status: SHIPPED | OUTPATIENT
Start: 2024-03-23 | End: 2024-03-26

## 2024-03-23 RX ORDER — DOXYCYCLINE 100 MG/1
100 CAPSULE ORAL 2 TIMES DAILY
Qty: 10 CAPSULE | Refills: 0 | Status: SHIPPED | OUTPATIENT
Start: 2024-03-23 | End: 2024-03-28

## 2024-03-23 RX ADMIN — HYDROCODONE BITARTRATE AND ACETAMINOPHEN 1 TABLET: 5; 325 TABLET ORAL at 05:03

## 2024-03-23 RX ADMIN — DOXYCYCLINE HYCLATE 100 MG: 100 TABLET, COATED ORAL at 09:03

## 2024-03-23 NOTE — ED PROVIDER NOTES
Encounter Date: 3/23/2024       History     Chief Complaint   Patient presents with    Left foot pain      Possible gout flair per pt, not able to walk in 1 week      68-year-old male history of atrial fibrillation on Eliquis, CAD, ESRD on HD (last session today), hypertension, hyperlipidemia, gout presents to the emergency department with chief complaint of left foot swelling, left foot ankle and pain that began about 1 week ago.  He denies any trauma or injury.  He states that he has no longer able to walk due to the pain.  He has been taking Tylenol without much relief.  His pain is intermittent.  It is worse with standing or attempting to ambulate.  It is also worse with certain positions.    He denies symptoms of this nature in the past.  The pain does not radiate into the calf or into the leg. He denies any other worsening or alleviating factors.      Review of patient's allergies indicates:   Allergen Reactions    Ace inhibitors Swelling    Verapamil Other (See Comments)     Other reaction(s): Unknown    Povidone-iodine Itching     Past Medical History:   Diagnosis Date    Atrial fibrillation     CAD (coronary artery disease) 2006    MI in 2006    CHF (congestive heart failure) 2017    CKD (chronic kidney disease) stage 3, GFR 30-59 ml/min     Dr. Latif.  in transplanted kidney    CKD (chronic kidney disease) stage 5, GFR less than 15 ml/min 02/02/2024    COVID-19 02/07/2023    Diverticulosis     Hyperlipidemia     Hypertension     Keloid cicatrix     Metabolic bone disease     Other emphysema 10/01/2019    Pericarditis     S/P kidney transplant 1992    x2 (1992 & 2005),    Thrombocytopenia     Thyroid disease     Tobacco use 09/05/2014     Past Surgical History:   Procedure Laterality Date    AV FISTULA PLACEMENT Left 12/18/2023    Procedure: CREATION, AV FISTULA;  Surgeon: JUANI Melendez III, MD;  Location: [facility] OR [address] [place] [address];  Service: Vascular;  Laterality: Left;  LUE AVF creation vs AVG insertion     "CARDIOVERSION  04/30/15    CHOLECYSTECTOMY      COLONOSCOPY  April 20, 2011    Diverticulosis, repeat recommended in 3 yrs., repeat colonoscopy 2014 revealed 2 polyps.  He should return in 5 years.    COLONOSCOPY N/A 3/13/2020    Procedure: COLONOSCOPY;  Surgeon: Chon Casper MD;  Location: [facility] (4TH FLR);  Service: Endoscopy;  Laterality: N/A;  ok to hold coumadin x5days-see telephone encounter 2/4/20-tb    COLONOSCOPY N/A 2/4/2021    Procedure: COLONOSCOPY;  Surgeon: Chon Casper MD;  Location: [facility] (4TH FLR);  Service: Endoscopy;  Laterality: N/A;  Eliquis - per Dr. GAVIN Blunt ok to hold x 2 days and "restarted asap"- ERW  last prep "poor", [id number] ordered/ Pt requests Dr. Casper  prep ins. mailed - COVID screening on 2/1/21 PCW- ERW    COLONOSCOPY N/A 3/3/2021    Procedure: COLONOSCOPY;  Surgeon: Chon Casper MD;  Location: [facility] (4TH FLR);  Service: Endoscopy;  Laterality: N/A;  Patient with his second poor bowel pre and has poor prep today.  If patient not intersted or can't get colonoscopy tomorrow will need constipation bowel prep and will need to restart his Eliquis today.Thanks,Chon Blunt-ok to hold Eliquis 2 days prior      COVID     CORONARY ANGIOGRAPHY N/A 2/28/2024    Procedure: ANGIOGRAM, CORONARY ARTERY;  Surgeon: Tylor Lincoln MD;  Location: [facility] CATH LAB;  Service: Cardiology;  Laterality: N/A;    CORONARY ANGIOPLASTY WITH STENT PLACEMENT  9/13/2006    FISTULOGRAM N/A 2/19/2024    Procedure: Fistulogram;  Surgeon: JUANI Melendez III, MD;  Location: [facility] CATH LAB;  Service: Peripheral Vascular;  Laterality: N/A;    IRRIGATION AND DEBRIDEMENT Right 8/30/2023    Procedure: IRRIGATION AND DEBRIDEMENT, RIGHT MIDDLE FINGER;  Surgeon: Martin Larsen MD;  Location: [facility] OR [facility] FLR;  Service: Orthopedics;  Laterality: Right;    KIDNEY TRANSPLANT  2005    LEFT HEART CATHETERIZATION N/A 2/26/2024    Procedure: Left heart cath;  Surgeon: Tylor Lincoln " MD INESSA;  Location: [facility] CATH LAB;  Service: Cardiology;  Laterality: N/A;    PARATHYROIDECTOMY      PERCUTANEOUS TRANSLUMINAL ANGIOPLASTY OF ARTERIOVENOUS FISTULA Left 2024    Procedure: PTA, AV FISTULA;  Surgeon: JUANI Melendez III, MD;  Location: [facility] CATH LAB;  Service: Peripheral Vascular;  Laterality: Left;    STENT, DRUG ELUTING, SINGLE VESSEL, CORONARY N/A 2024    Procedure: Stent, Drug Eluting, Single Vessel, Coronary;  Surgeon: Tylor Lincoln MD;  Location: [facility] CATH LAB;  Service: Cardiology;  Laterality: N/A;    TREATMENT OF CARDIAC ARRHYTHMIA N/A 3/28/2022    Procedure: CARDIOVERSION;  Surgeon: Migue Blunt MD;  Location: [facility] EP LAB;  Service: Cardiology;  Laterality: N/A;  AF, DCC, MAC, GP, 3 PREP*RODRIGO deferred pt 100% compliant*     Family History   Problem Relation Age of Onset    No Known Problems Sister     No Known Problems Brother     Thyroid disease Mother         s/p surgery    Heart disease Father         had pacemaker    No Known Problems Sister     Kidney failure Sister     Kidney disease Sister     No Known Problems Sister     Kidney disease Brother     Kidney failure Brother         s/p transplant    Diabetes Mellitus Neg Hx     Stroke Neg Hx     Heart attack Neg Hx     Cancer Neg Hx     Celiac disease Neg Hx     Cirrhosis Neg Hx     Colon cancer Neg Hx     Esophageal cancer Neg Hx     Inflammatory bowel disease Neg Hx     Rectal cancer Neg Hx     Stomach cancer Neg Hx     Ulcerative colitis Neg Hx     Liver disease Neg Hx     Liver cancer Neg Hx     Crohn's disease Neg Hx     Melanoma Neg Hx      Social History     Tobacco Use    Smoking status: Every Day     Current packs/day: 0.00     Average packs/day: 0.5 packs/day for 40.0 years (20.0 ttl pk-yrs)     Types: Cigarettes     Start date: 1982     Last attempt to quit: 2022     Years since quittin.0    Smokeless tobacco: Never    Tobacco comments:     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  He is not exercising.     Patient is currently retired   Substance Use Topics    Alcohol use: No    Drug use: No     Review of Systems   Musculoskeletal:         Foot pain        Physical Exam     Initial Vitals [03/23/24 1554]   BP Pulse Resp Temp SpO2   138/67 77 16 98.6 °F (37 °C) 99 %      MAP       --         Physical Exam    Vitals reviewed.  Constitutional: He appears well-developed and well-nourished. He is not diaphoretic. No distress.   HENT:   Head: Normocephalic and atraumatic.   Mouth/Throat: Oropharynx is clear and moist.   Eyes: EOM are normal. Pupils are equal, round, and reactive to light.   Neck: Neck supple.   Normal range of motion.  Cardiovascular:  Normal rate.           Pulmonary/Chest: No respiratory distress.   Abdominal: He exhibits no distension.   Musculoskeletal:         General: Edema present. Normal range of motion.      Cervical back: Normal range of motion and neck supple.      Comments: Edema noted to the dorsal aspect of the left foot without erythema, fluctuance, induration.  Tenderness palpation to the lateral aspect of the left foot along the 5th metatarsal.  No tenderness palpation along the volar aspect of the foot.  Able to actively and passively range the ankle well.  2+ distal pulses.     Neurological: He is alert and oriented to person, place, and time. He has normal strength. GCS score is 15. GCS eye subscore is 4. GCS verbal subscore is 5. GCS motor subscore is 6.   Skin: Skin is warm and dry. Capillary refill takes less than 2 seconds.   Psychiatric: He has a normal mood and affect. His behavior is normal. Judgment and thought content normal.         ED Course   Procedures  Labs Reviewed   CBC W/ AUTO DIFFERENTIAL - Abnormal; Notable for the following components:       Result Value    RBC 2.94 (*)     Hemoglobin 7.8 (*)     Hematocrit 25.3 (*)     MCH 26.5 (*)     MCHC 30.8 (*)     RDW 17.0 (*)     Mono # 1.1 (*)     Lymph % 16.6 (*)     Mono % 15.9 (*)     All other  components within normal limits   COMPREHENSIVE METABOLIC PANEL - Abnormal; Notable for the following components:    Creatinine 2.9 (*)     Calcium 7.7 (*)     Albumin 2.9 (*)     AST 8 (*)     ALT 8 (*)     eGFR 22.7 (*)     All other components within normal limits    Narrative:     ADD ON URIC ACID PER DR MITRA BARRON/ORDER# [id number] @ 18:49   C-REACTIVE PROTEIN - Abnormal; Notable for the following components:    CRP 43.9 (*)     All other components within normal limits    Narrative:     ADD ON URIC ACID PER DR MITRA BARRON/ORDER# [id number] @ 18:49   URIC ACID - Abnormal; Notable for the following components:    Uric Acid 1.9 (*)     All other components within normal limits    Narrative:     ADD ON URIC ACID PER DR MITRA BARRON/ORDER# [id number] @ 18:49   URIC ACID          Imaging Results              X-Ray Foot Complete Left (Final result)  Result time 03/23/24 18:52:32      Final result by Alton Carranza DO (03/23/24 18:52:32)                   Impression:      No acute fracture or dislocation.    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      Electronically signed by: Alton Carranza  Date:    03/23/2024  Time:    18:52               Narrative:    EXAMINATION:  XR FOOT COMPLETE 3 VIEW LEFT    CLINICAL HISTORY:  .  Pain in left foot    TECHNIQUE:  AP, lateral and oblique views of the left foot were performed.    COMPARISON:  10/13/2023.    FINDINGS:  There is no acute fracture or dislocation.  Alignment is normal.  There are vascular calcifications.  There is a chronic deformity of the 5th metatarsal head.  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  There are Achilles and plantar calcaneal enthesophytes.  There is scattered joint space narrowing and marginal osteophytes.  Lisfranc articulation is congruent.                                       X-Ray Ankle Complete Left (Final  result)  Result time 03/23/24 19:08:57      Final result by Kalia Sanders MD (03/23/24 19:08:57)                   Impression:      No acute osseous abnormalities.    Unchanged chronic and degenerative changes as above.    Electronically signed by resident: Tristin Rosario  Date:    03/23/2024  Time:    18:41    Electronically signed by: Kalia Sanders MD  Date:    03/23/2024  Time:    19:08               Narrative:    EXAMINATION:  XR ANKLE COMPLETE 3 VIEW LEFT    CLINICAL HISTORY:  Pain in left ankle and joints of left foot    TECHNIQUE:  AP, lateral and oblique views of the left ankle were performed.    COMPARISON:  Left foot radiograph 10/13/2023.    FINDINGS:  No acute displaced fracture, dislocation or osseous destruction.  Degenerative changes including prominent calcaneal spurs and medial malleolar enthesophytes.  Ankle mortise is symmetric.  Postsurgical changes of remote osteotomy of left 5th metatarsal.    Vascular calcifications, unchanged.  No significant soft tissue swelling visualized.                                       Medications   HYDROcodone-acetaminophen 5-325 mg per tablet 1 tablet (1 tablet Oral Given 3/23/24 1747)   doxycycline tablet 100 mg (100 mg Oral Given 3/23/24 2106)     Medical Decision Making  Emergent evaluation of a 68 y.o. male presenting to the emergency department complaining of atraumatic left foot pain that has worsened over the last week. Now unable to ambulate due to pain. Taking tylenol at home. Patient is afebrile, hemodynamically stable, and non toxic appearing.   Will order labs, imaging.     Differential diagnosis includes but isn't limited to cellulitis, abscess, osteomyelitis, fracture, dislocation.    Patient presenting with atraumatic left foot pain that began 1 week ago.  It has worsened since then.  Pain does not radiate into the leg and remains in the lateral aspect of the left foot.  The dorsum of the left foot is swollen.  He has point tenderness along the  lateral aspect of the foot only.  He is 2+ distal pulses.  He does not have overt signs of cellulitis clinically, but bedside performed by my supervising physician does show signs of cellulitis along the lateral aspect of the foot.  We will cover him with doxycycline.  He has stable anemia with a hemoglobin of 7.8.  Creatinine of 2.9.  Calcium 7.7.  CRP elevated at 43.9.  Uric acid 1.9.  X-ray of the left foot does not reveal fracture or dislocation.  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  Patient reports that his pain has improved with Norco.  He states that he is now having pain in his left elbow, which does feel like a gout flare.  The joint is minimally swollen but without erythema or warmth.  He has good range of motion of the joint.  Do not suspect septic arthritis.  With his ESRD status, he has not a good candidate for anti-inflammatories.  He is given a prescription for doxycycline and Norco to go home with.  Return precautions given.  All questions answered.  The patient was instructed to follow up with a primary care provider or to return to the emergency department for worsening symptoms. The treatment plan was discussed with the patient who demonstrated understanding and comfort with plan. The patient's history, physical exam, and plan of care was discussed with and agreed upon with my supervising physician.     Amount and/or Complexity of Data Reviewed  Labs: ordered.  Radiology: ordered.    Risk  Prescription drug management.                                      Clinical Impression:  Final diagnoses:  [M79.672] Left foot pain  [M25.572] Acute left ankle pain  [L03.116] Cellulitis of left foot (Primary)          ED Disposition Condition    Discharge Stable          ED Prescriptions       Medication Sig Dispense Start Date End Date Auth. Provider    doxycycline (VIBRAMYCIN) 100 MG Cap Take 1 capsule (100 mg total) by  mouth 2 (two) times daily. for 5 days 10 capsule 3/23/2024 3/28/2024 Amy Briceño PA-C    HYDROcodone-acetaminophen (NORCO) 5-325 mg per tablet Take 1 tablet by mouth every 6 (six) hours as needed for Pain. 12 tablet 3/23/2024 3/26/2024 Amy Briceño PA-C          Follow-up Information       Follow up With Specialties Details Why Contact Info    [facility] - Emergency Dept Emergency Medicine Go to   [address] [facility] [id number] [phone]    Anatoliy Colindres MD Family Medicine Schedule an appointment as soon as possible for a visit   [address]  [place] [address]  [phone]               Amy Briceño PA-C  03/24/24 9603

## 2024-03-23 NOTE — ED NOTES
Patient identifiers verified and correct for Ibrahima Phelps    LOC: The patient is awake, alert and aware of environment with an appropriate affect, the patient is oriented x 3 and speaking appropriately.   APPEARANCE: Patient appears comfortable and in no acute distress, patient is clean and well groomed.  SKIN: The skin is warm and dry, color consistent with ethnicity, patient has normal skin turgor and moist mucus membranes, skin intact, no breakdown or bruising noted.   MUSCULOSKELETAL: Patient moving all extremities spontaneously, no swelling noted.  RESPIRATORY: Airway is open and patent, respirations are spontaneous, patient has a normal effort and rate, no accessory muscle use noted, pt placed on pulse ox SPO2 noted at 97% on room air.  CARDIAC: Patient has a normal rate, no edema noted, capillary refill < 3 seconds.   GASTRO: Soft and non tender to palpation, no distention noted. Pt states bowel movements have been regular.  : Pt denies any pain or frequency with urination.  NEURO: Pt opens eyes spontaneously, behavior appropriate to situation, follows commands, facial expression symmetrical, bilateral hand grasp equal and even, purposeful motor response noted.  PAIN:  10/10

## 2024-03-24 NOTE — DISCHARGE INSTRUCTIONS
You Have a skin infection of your left foot.  Take doxycycline as prescribed and to completion.  I have also given a prescription for Norco.  This medication is hydrocodone and acetaminophen.  There is 325 mg of acetaminophen in this medication.  Do not exceed 3000 mg of acetaminophen in a 24 hour period.  Follow up with your primary doctor or return to the emergency department sooner for any new or worsening symptoms.

## 2024-03-25 ENCOUNTER — PATIENT OUTREACH (OUTPATIENT)
Dept: EMERGENCY MEDICINE | Facility: HOSPITAL | Age: 69
End: 2024-03-25
Payer: MEDICARE

## 2024-03-25 ENCOUNTER — PATIENT MESSAGE (OUTPATIENT)
Dept: NEPHROLOGY | Facility: CLINIC | Age: 69
End: 2024-03-25
Payer: MEDICARE

## 2024-03-25 NOTE — PROGRESS NOTES
Call placed per ED Navigator to f/u from last encounter. No answer. ED navigator will follow-up periodically.

## 2024-03-27 NOTE — SUBJECTIVE & OBJECTIVE
Akiko Kang was seen and treated in our emergency department on 3/27/2024.                Diagnosis:     Akiko  is off the rest of the shift today, may return to work on return date.    She may return on this date: 03/29/2024         If you have any questions or concerns, please don't hesitate to call.      Joaquim Jaramillo PA-C    ______________________________           _______________          _______________  Hospital Representative                              Date                                Time "Past Medical History:   Diagnosis Date    Atrial fibrillation     CAD (coronary artery disease) 2006    MI in 2006    CHF (congestive heart failure) 2017    CKD (chronic kidney disease) stage 3, GFR 30-59 ml/min     Dr. Latif.  in transplanted kidney    COVID-19 2/7/2023    Diverticulosis     Hyperlipidemia     Hypertension     Keloid cicatrix     Metabolic bone disease     Other emphysema 10/1/2019    Pericarditis     S/P kidney transplant 1992    x2 (1992 & 2005),    Thrombocytopenia     Thyroid disease     Tobacco use 09/05/2014       Past Surgical History:   Procedure Laterality Date    CARDIOVERSION  04/30/15    CHOLECYSTECTOMY      COLONOSCOPY  April 20, 2011    Diverticulosis, repeat recommended in 3 yrs., repeat colonoscopy 2014 revealed 2 polyps.  He should return in 5 years.    COLONOSCOPY N/A 3/13/2020    Procedure: COLONOSCOPY;  Surgeon: Chon Casper MD;  Location: [facility] (4TH FLR);  Service: Endoscopy;  Laterality: N/A;  ok to hold coumadin x5days-see telephone encounter 2/4/20-tb    COLONOSCOPY N/A 2/4/2021    Procedure: COLONOSCOPY;  Surgeon: Chon Casper MD;  Location: [facility] (4TH FLR);  Service: Endoscopy;  Laterality: N/A;  Eliquis - per Dr. GAVIN Blunt ok to hold x 2 days and "restarted asap"- ERW  last prep "poor", [id number] ordered/ Pt requests Dr. Casper  prep ins. mailed - COVID screening on 2/1/21 PCW- ERW    COLONOSCOPY N/A 3/3/2021    Procedure: COLONOSCOPY;  Surgeon: Chon Casper MD;  Location: [facility] (4TH FLR);  Service: Endoscopy;  Laterality: N/A;  Patient with his second poor bowel pre and has poor prep today.  If patient not intersted or can't get colonoscopy tomorrow will need constipation bowel prep and will need to restart his Eliquis today.Thanks,Chon     per Dr Blunt-ok to hold Eliquis 2 days prior      COVID     CORONARY ANGIOPLASTY WITH STENT PLACEMENT  9/13/2006    IRRIGATION AND DEBRIDEMENT Right 8/30/2023    Procedure: IRRIGATION AND DEBRIDEMENT, " RIGHT MIDDLE FINGER;  Surgeon: Martin Larsen MD;  Location: [facility] OR [facility] FLR;  Service: Orthopedics;  Laterality: Right;    KIDNEY TRANSPLANT  2005    PARATHYROIDECTOMY      TREATMENT OF CARDIAC ARRHYTHMIA N/A 3/28/2022    Procedure: CARDIOVERSION;  Surgeon: Migue Blunt MD;  Location: [facility] EP LAB;  Service: Cardiology;  Laterality: N/A;  AF, DCC, MAC, GP, 3 PREP*RODRIGO deferred pt 100% compliant*       Review of patient's allergies indicates:   Allergen Reactions    Ace inhibitors Swelling    Verapamil Other (See Comments)     Other reaction(s): Unknown    Povidone-iodine Itching       No current facility-administered medications on file prior to encounter.     Current Outpatient Medications on File Prior to Encounter   Medication Sig    acetaminophen (TYLENOL) 500 MG tablet Take 1 tablet (500 mg total) by mouth every 6 (six) hours as needed for Pain.    albuterol (PROVENTIL) 2.5 mg /3 mL (0.083 %) nebulizer solution Take 3 mLs (2.5 mg total) by nebulization every 6 (six) hours as needed for Wheezing. Rescue    albuterol (VENTOLIN HFA) 90 mcg/actuation inhaler Inhale 2 puffs into the lungs every 6 (six) hours as needed for Wheezing or Shortness of Breath. Rescue    amiodarone (PACERONE) 200 MG Tab Take 1 tablet (200 mg total) by mouth once daily.    apixaban (ELIQUIS) 5 mg Tab Take 1 tablet (5 mg total) by mouth 2 (two) times daily.    atorvastatin (LIPITOR) 40 MG tablet Take 1 tablet (40 mg total) by mouth once daily.    b complex vitamins (B COMPLEX-VITAMIN B12) tablet Take 1 tablet by mouth once daily.    calcium acetate,phosphat bind, (PHOSLO) 667 mg capsule TAKE 1 CAPSULE BY MOUTH THREE TIMES A DAY WITH MEALS AND SNACKS    calcium carbonate (CALCIUM 600 ORAL) Take 1 tablet by mouth 2 (two) times a day.    CHOLECALCIFEROL, VITAMIN D3, ORAL Take 2 tablets by mouth 2 (two) times daily.    clopidogreL (PLAVIX) 75 mg tablet Take 1 tablet (75 mg total) by mouth once daily.    doxazosin (CARDURA) 4 MG  tablet Take 1 tablet (4 mg total) by mouth every 12 (twelve) hours.    famotidine (PEPCID) 20 MG tablet Take 1 tablet (20 mg total) by mouth 2 (two) times daily.    fexofenadine HCl (ALLEGRA ORAL) Take 1 tablet by mouth daily as needed (allergies).    fluticasone propionate (FLONASE) 50 mcg/actuation nasal spray 1 spray (50 mcg total) by Each Nostril route daily as needed for Allergies.    FLUZONE HIGHDOSE QUAD 23-24  mcg/0.7 mL Syrg     furosemide (LASIX) 20 MG tablet One po QAM, and one po at lunchtime if swelling prn    furosemide (LASIX) 80 MG tablet Take 1 tablet (80 mg total) by mouth every morning.    gabapentin (NEURONTIN) 100 MG capsule Take 1 capsule (100 mg total) by mouth as needed (pain).    hydrALAZINE (APRESOLINE) 50 MG tablet TAKE 1 TABLET (50 MG TOTAL) BY MOUTH 3 (THREE) TIMES DAILY.    isosorbide mononitrate (IMDUR) 30 MG 24 hr tablet Take 2 tablets (60 mg total) by mouth once daily.    metoprolol succinate (TOPROL-XL) 25 MG 24 hr tablet Take 1 tablet (25 mg total) by mouth once daily. (Patient taking differently: Take 12.5 mg by mouth 2 (two) times daily.)    MITIGARE 0.6 mg Cap TAKE 1 CAPSULE BY MOUTH TWICE A DAY AS NEEDED GOUT FLARE UP TO 3 DAYS    multivitamin (THERAGRAN) per tablet Take 1 tablet by mouth Daily.    NIFEdipine (PROCARDIA-XL) 60 MG (OSM) 24 hr tablet Take 1 tablet (60 mg total) by mouth 2 (two) times a day.    nitroGLYCERIN (NITROSTAT) 0.4 MG SL tablet Place 1 tablet (0.4 mg total) under the tongue every 5 (five) minutes as needed for Chest pain.    omeprazole (PRILOSEC) 20 MG capsule Take 1 capsule (20 mg total) by mouth daily as needed (heartburn).    paricalcitoL (ZEMPLAR) 1 MCG capsule TAKE 1 CAPSULE ON MONDAY, WEDNESDAY AND FRIDAY    PFIZER COVID BIVAL,12Y UP,,PF, 30 mcg/0.3 mL injection Inject into the muscle.    potassium chloride (KLOR-CON) 10 MEQ TbSR Take 1 tablet (10 mEq total) by mouth daily as needed (to be taken with lasix).,    predniSONE (DELTASONE) 5 MG  tablet TAKE 1 TABLET BY MOUTH EVERY DAY IN THE MORNING    pulse oximeter (PULSE OXIMETER) device by Apply Externally route 2 (two) times a day. Use twice daily at 8 AM and 3 PM and record the value in MyChart as directed.    tacrolimus (PROGRAF) 1 MG Cap Take 3 capsules (3 mg total) by mouth every 12 (twelve) hours.    fluticasone propion-salmeterol 115-21 mcg/dose (ADVAIR HFA) 115-21 mcg/actuation HFAA inhaler Inhale 2 puffs into the lungs every 12 (twelve) hours. Controller (Patient taking differently: Inhale 2 puffs into the lungs 2 (two) times daily as needed (shortness of breath). 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     Patient is currently retired   Substance and Sexual Activity    Alcohol use: No    Drug use: No    Sexual activity: Yes     Partners: Female     Review of Systems   Constitutional: Negative for chills, fever and night sweats.   Eyes:  Negative for visual disturbance.   Cardiovascular:  Positive for chest pain and dyspnea on exertion. Negative for leg swelling and orthopnea.   Respiratory:  Positive for cough and shortness of breath. Negative for hemoptysis.    Skin:  Negative for color change and rash.   Musculoskeletal:  Negative for joint pain and myalgias.   Gastrointestinal:  Negative for abdominal pain, diarrhea, hematemesis, hematochezia and melena.   Genitourinary:  Negative for dysuria and hematuria.    Neurological:  Negative for headaches and light-headedness.   Psychiatric/Behavioral:  Negative for altered mental status and memory loss.    All other systems reviewed and are negative.    Objective:     Vital Signs (Most Recent):  Temp: 97.9 °F (36.6 °C) (11/24/23 0442)  Pulse: 69 (11/24/23 0442)  Resp: 20 (11/24/23 0442)  BP: (!) 156/71 (11/24/23 0442)  SpO2: 97 % (11/24/23 0442) Vital Signs (24h Range):  Temp:  [97.9 °F (36.6 °C)-98.3 °F (36.8 °C)] 97.9 °F (36.6 °C)  Pulse:  [62-86] 69  Resp:  [16-22] 20  SpO2:  [94 %-100 %] 97 %  BP: (135-174)/(59-87) 156/71     Weight: 90.9 kg (200 lb 6.4 oz)  Body mass index is 26.44 kg/m².    SpO2: 97 %       No intake or output data in the 24 hours ending 11/24/23 0745    Lines/Drains/Airways       Peripheral Intravenous Line  Duration                  Peripheral IV - Single Lumen 11/23/23 0819 20 G Anterior Antecubital <1 day                     Physical Exam  Vitals and nursing note reviewed.   Constitutional:       General: He is not in acute distress.     Appearance: He is not ill-appearing.   HENT:      Head: Normocephalic and atraumatic.      Right Ear: External ear normal.      Left Ear: External ear normal.      Mouth/Throat:      Mouth: Mucous membranes are moist.      Pharynx: No oropharyngeal exudate.   Eyes:      Extraocular Movements: Extraocular movements intact.      Pupils: Pupils are equal, round, and reactive to light.   Neck:      Vascular: JVD present.   Cardiovascular:      Rate and Rhythm: Normal rate and regular rhythm.      Pulses: Normal pulses.      Heart sounds: Normal heart sounds. No murmur heard.  Pulmonary:      Effort: Pulmonary effort is normal. No respiratory distress.      Breath sounds: Wheezing present. No rales.   Abdominal:      General: Abdomen is flat. Bowel sounds are normal. There is no distension.      Palpations: Abdomen is soft.      Tenderness: There is no abdominal tenderness.   Musculoskeletal:         General: No signs of  injury. Normal range of motion.      Cervical back: Normal range of motion.      Right lower leg: No edema.      Left lower leg: No edema.   Skin:     General: Skin is warm and dry.   Neurological:      General: No focal deficit present.      Mental Status: He is alert and oriented to person, place, and time.      Sensory: No sensory deficit.      Motor: No weakness.   Psychiatric:         Mood and Affect: Mood normal.         Behavior: Behavior normal.         Thought Content: Thought content normal.          Significant Labs: All pertinent lab results from the last 24 hours have been reviewed.    Significant Imaging:  reviewed

## 2024-04-03 ENCOUNTER — PATIENT MESSAGE (OUTPATIENT)
Dept: INTERNAL MEDICINE | Facility: CLINIC | Age: 69
End: 2024-04-03
Payer: MEDICARE

## 2024-04-04 NOTE — TELEPHONE ENCOUNTER
Prednisone was filled 02/08/2024 by Nephrologist to be taken daily and filled for a 1 year supply. Responded to portal msg.

## 2024-04-09 ENCOUNTER — HOSPITAL ENCOUNTER (INPATIENT)
Facility: HOSPITAL | Age: 69
LOS: 2 days | Discharge: HOME | DRG: 280 | End: 2024-04-12
Attending: EMERGENCY MEDICINE | Admitting: INTERNAL MEDICINE
Payer: MEDICARE

## 2024-04-09 DIAGNOSIS — R07.9 ACUTE CHEST PAIN: Primary | ICD-10-CM

## 2024-04-09 DIAGNOSIS — N18.6 ESRD (END STAGE RENAL DISEASE): ICD-10-CM

## 2024-04-09 DIAGNOSIS — J81.0 FLASH PULMONARY EDEMA: ICD-10-CM

## 2024-04-09 DIAGNOSIS — I13.0 HYPERTENSIVE HEART AND RENAL DISEASE WITH RENAL FAILURE, STAGE 1 THROUGH STAGE 4 OR UNSPECIFIED CHRONIC KIDNEY DISEASE, WITH HEART FAILURE: ICD-10-CM

## 2024-04-09 DIAGNOSIS — I50.33 ACUTE ON CHRONIC DIASTOLIC HEART FAILURE: ICD-10-CM

## 2024-04-09 DIAGNOSIS — R06.00 ACUTE DYSPNEA: ICD-10-CM

## 2024-04-09 DIAGNOSIS — R07.9 CHEST PAIN: ICD-10-CM

## 2024-04-09 DIAGNOSIS — I48.91 ATRIAL FIBRILLATION WITH RAPID VENTRICULAR RESPONSE: ICD-10-CM

## 2024-04-09 DIAGNOSIS — I21.4 NSTEMI (NON-ST ELEVATED MYOCARDIAL INFARCTION): ICD-10-CM

## 2024-04-09 DIAGNOSIS — I48.91 ATRIAL FIBRILLATION WITH RVR: ICD-10-CM

## 2024-04-09 LAB
ALBUMIN SERPL BCP-MCNC: 3 G/DL (ref 3.5–5.2)
ALLENS TEST: ABNORMAL
ALLENS TEST: ABNORMAL
ALP SERPL-CCNC: 81 U/L (ref 55–135)
ALT SERPL W/O P-5'-P-CCNC: 17 U/L (ref 10–44)
ANION GAP SERPL CALC-SCNC: 14 MMOL/L (ref 8–16)
ANION GAP SERPL CALC-SCNC: 14 MMOL/L (ref 8–16)
APTT PPP: 33.5 SEC (ref 21–32)
AST SERPL-CCNC: 16 U/L (ref 10–40)
BACTERIA #/AREA URNS AUTO: NORMAL /HPF
BASOPHILS # BLD AUTO: 0.01 K/UL (ref 0–0.2)
BASOPHILS # BLD AUTO: 0.02 K/UL (ref 0–0.2)
BASOPHILS NFR BLD: 0.2 % (ref 0–1.9)
BASOPHILS NFR BLD: 0.3 % (ref 0–1.9)
BILIRUB SERPL-MCNC: 0.6 MG/DL (ref 0.1–1)
BILIRUB UR QL STRIP: NEGATIVE
BNP SERPL-MCNC: 3075 PG/ML (ref 0–99)
BUN SERPL-MCNC: 29 MG/DL (ref 8–23)
BUN SERPL-MCNC: 31 MG/DL (ref 8–23)
CALCIUM SERPL-MCNC: 8.3 MG/DL (ref 8.7–10.5)
CALCIUM SERPL-MCNC: 8.4 MG/DL (ref 8.7–10.5)
CHLORIDE SERPL-SCNC: 106 MMOL/L (ref 95–110)
CHLORIDE SERPL-SCNC: 107 MMOL/L (ref 95–110)
CLARITY UR REFRACT.AUTO: CLEAR
CO2 SERPL-SCNC: 20 MMOL/L (ref 23–29)
CO2 SERPL-SCNC: 20 MMOL/L (ref 23–29)
COLOR UR AUTO: YELLOW
CREAT SERPL-MCNC: 5.8 MG/DL (ref 0.5–1.4)
CREAT SERPL-MCNC: 5.9 MG/DL (ref 0.5–1.4)
DELSYS: ABNORMAL
DIFFERENTIAL METHOD BLD: ABNORMAL
DIFFERENTIAL METHOD BLD: ABNORMAL
EOSINOPHIL # BLD AUTO: 0.1 K/UL (ref 0–0.5)
EOSINOPHIL # BLD AUTO: 0.2 K/UL (ref 0–0.5)
EOSINOPHIL NFR BLD: 1.8 % (ref 0–8)
EOSINOPHIL NFR BLD: 3 % (ref 0–8)
ERYTHROCYTE [DISTWIDTH] IN BLOOD BY AUTOMATED COUNT: 17.4 % (ref 11.5–14.5)
ERYTHROCYTE [DISTWIDTH] IN BLOOD BY AUTOMATED COUNT: 17.7 % (ref 11.5–14.5)
EST. GFR  (NO RACE VARIABLE): 9.7 ML/MIN/1.73 M^2
EST. GFR  (NO RACE VARIABLE): 9.9 ML/MIN/1.73 M^2
ESTIMATED AVG GLUCOSE: 97 MG/DL (ref 68–131)
GLUCOSE SERPL-MCNC: 119 MG/DL (ref 70–110)
GLUCOSE SERPL-MCNC: 92 MG/DL (ref 70–110)
GLUCOSE UR QL STRIP: NEGATIVE
HBA1C MFR BLD: 5 % (ref 4–5.6)
HCO3 UR-SCNC: 22.2 MMOL/L (ref 24–28)
HCT VFR BLD AUTO: 22.8 % (ref 40–54)
HCT VFR BLD AUTO: 27.2 % (ref 40–54)
HCT VFR BLD CALC: 27 %PCV (ref 36–54)
HCV AB SERPL QL IA: NORMAL
HGB BLD-MCNC: 7.1 G/DL (ref 14–18)
HGB BLD-MCNC: 8.2 G/DL (ref 14–18)
HGB UR QL STRIP: NEGATIVE
HYALINE CASTS UR QL AUTO: 0 /LPF
IMM GRANULOCYTES # BLD AUTO: 0.02 K/UL (ref 0–0.04)
IMM GRANULOCYTES # BLD AUTO: 0.03 K/UL (ref 0–0.04)
IMM GRANULOCYTES NFR BLD AUTO: 0.3 % (ref 0–0.5)
IMM GRANULOCYTES NFR BLD AUTO: 0.5 % (ref 0–0.5)
INR PPP: 1.3 (ref 0.8–1.2)
KETONES UR QL STRIP: NEGATIVE
LACTATE SERPL-SCNC: 3.4 MMOL/L (ref 0.5–2.2)
LDH SERPL L TO P-CCNC: 3.22 MMOL/L (ref 0.36–1.25)
LEUKOCYTE ESTERASE UR QL STRIP: NEGATIVE
LYMPHOCYTES # BLD AUTO: 1 K/UL (ref 1–4.8)
LYMPHOCYTES # BLD AUTO: 1.3 K/UL (ref 1–4.8)
LYMPHOCYTES NFR BLD: 15.8 % (ref 18–48)
LYMPHOCYTES NFR BLD: 18.7 % (ref 18–48)
MAGNESIUM SERPL-MCNC: 2.1 MG/DL (ref 1.6–2.6)
MCH RBC QN AUTO: 24.8 PG (ref 27–31)
MCH RBC QN AUTO: 25.6 PG (ref 27–31)
MCHC RBC AUTO-ENTMCNC: 30.1 G/DL (ref 32–36)
MCHC RBC AUTO-ENTMCNC: 31.1 G/DL (ref 32–36)
MCV RBC AUTO: 82 FL (ref 82–98)
MCV RBC AUTO: 82 FL (ref 82–98)
MICROSCOPIC COMMENT: NORMAL
MODE: ABNORMAL
MONOCYTES # BLD AUTO: 0.4 K/UL (ref 0.3–1)
MONOCYTES # BLD AUTO: 0.5 K/UL (ref 0.3–1)
MONOCYTES NFR BLD: 6.4 % (ref 4–15)
MONOCYTES NFR BLD: 6.7 % (ref 4–15)
NEUTROPHILS # BLD AUTO: 4.7 K/UL (ref 1.8–7.7)
NEUTROPHILS # BLD AUTO: 5 K/UL (ref 1.8–7.7)
NEUTROPHILS NFR BLD: 71 % (ref 38–73)
NEUTROPHILS NFR BLD: 75.3 % (ref 38–73)
NITRITE UR QL STRIP: NEGATIVE
NRBC BLD-RTO: 0 /100 WBC
NRBC BLD-RTO: 0 /100 WBC
PCO2 BLDA: 41.4 MMHG (ref 35–45)
PH SMN: 7.34 [PH] (ref 7.35–7.45)
PH UR STRIP: 7 [PH] (ref 5–8)
PHOSPHATE SERPL-MCNC: 2.8 MG/DL (ref 2.7–4.5)
PLATELET # BLD AUTO: 255 K/UL (ref 150–450)
PLATELET # BLD AUTO: 291 K/UL (ref 150–450)
PMV BLD AUTO: 12.2 FL (ref 9.2–12.9)
PMV BLD AUTO: 12.8 FL (ref 9.2–12.9)
PO2 BLDA: 75 MMHG (ref 80–100)
POC BE: -4 MMOL/L
POC CARDIAC TROPONIN I: 0.38 NG/ML (ref 0–0.08)
POC IONIZED CALCIUM: 1.1 MMOL/L (ref 1.06–1.42)
POC SATURATED O2: 94 % (ref 95–100)
POC TCO2: 23 MMOL/L (ref 23–27)
POTASSIUM BLD-SCNC: 4.1 MMOL/L (ref 3.5–5.1)
POTASSIUM SERPL-SCNC: 3.6 MMOL/L (ref 3.5–5.1)
POTASSIUM SERPL-SCNC: 4.2 MMOL/L (ref 3.5–5.1)
PROT SERPL-MCNC: 6.8 G/DL (ref 6–8.4)
PROT UR QL STRIP: ABNORMAL
PROTHROMBIN TIME: 14.3 SEC (ref 9–12.5)
RBC # BLD AUTO: 2.77 M/UL (ref 4.6–6.2)
RBC # BLD AUTO: 3.3 M/UL (ref 4.6–6.2)
RBC #/AREA URNS AUTO: 1 /HPF (ref 0–4)
SAMPLE: ABNORMAL
SITE: ABNORMAL
SITE: ABNORMAL
SODIUM BLD-SCNC: 141 MMOL/L (ref 136–145)
SODIUM SERPL-SCNC: 140 MMOL/L (ref 136–145)
SODIUM SERPL-SCNC: 141 MMOL/L (ref 136–145)
SP GR UR STRIP: 1.01 (ref 1–1.03)
TROPONIN I SERPL DL<=0.01 NG/ML-MCNC: 0.04 NG/ML (ref 0–0.03)
TROPONIN I SERPL DL<=0.01 NG/ML-MCNC: 0.42 NG/ML (ref 0–0.03)
TROPONIN I SERPL DL<=0.01 NG/ML-MCNC: 1.93 NG/ML (ref 0–0.03)
TROPONIN I SERPL DL<=0.01 NG/ML-MCNC: 3.78 NG/ML (ref 0–0.03)
TSH SERPL DL<=0.005 MIU/L-ACNC: 0.86 UIU/ML (ref 0.4–4)
URN SPEC COLLECT METH UR: ABNORMAL
WBC # BLD AUTO: 6.22 K/UL (ref 3.9–12.7)
WBC # BLD AUTO: 7 K/UL (ref 3.9–12.7)
WBC #/AREA URNS AUTO: 1 /HPF (ref 0–5)

## 2024-04-09 PROCEDURE — 99223 1ST HOSP IP/OBS HIGH 75: CPT | Mod: 25,,, | Performed by: INTERNAL MEDICINE

## 2024-04-09 PROCEDURE — 82330 ASSAY OF CALCIUM: CPT

## 2024-04-09 PROCEDURE — 5A1D70Z PERFORMANCE OF URINARY FILTRATION, INTERMITTENT, LESS THAN 6 HOURS PER DAY: ICD-10-PCS | Performed by: INTERNAL MEDICINE

## 2024-04-09 PROCEDURE — 85025 COMPLETE CBC W/AUTO DIFF WBC: CPT | Mod: 91

## 2024-04-09 PROCEDURE — 83605 ASSAY OF LACTIC ACID: CPT | Performed by: PHYSICIAN ASSISTANT

## 2024-04-09 PROCEDURE — 85025 COMPLETE CBC W/AUTO DIFF WBC: CPT | Performed by: EMERGENCY MEDICINE

## 2024-04-09 PROCEDURE — 85014 HEMATOCRIT: CPT

## 2024-04-09 PROCEDURE — G0378 HOSPITAL OBSERVATION PER HR: HCPCS

## 2024-04-09 PROCEDURE — 93010 ELECTROCARDIOGRAM REPORT: CPT | Mod: ,,, | Performed by: INTERNAL MEDICINE

## 2024-04-09 PROCEDURE — 85730 THROMBOPLASTIN TIME PARTIAL: CPT

## 2024-04-09 PROCEDURE — 85610 PROTHROMBIN TIME: CPT

## 2024-04-09 PROCEDURE — 83880 ASSAY OF NATRIURETIC PEPTIDE: CPT | Performed by: EMERGENCY MEDICINE

## 2024-04-09 PROCEDURE — 80197 ASSAY OF TACROLIMUS: CPT | Performed by: EMERGENCY MEDICINE

## 2024-04-09 PROCEDURE — 84484 ASSAY OF TROPONIN QUANT: CPT | Mod: 91 | Performed by: EMERGENCY MEDICINE

## 2024-04-09 PROCEDURE — 83036 HEMOGLOBIN GLYCOSYLATED A1C: CPT | Performed by: EMERGENCY MEDICINE

## 2024-04-09 PROCEDURE — 99900035 HC TECH TIME PER 15 MIN (STAT)

## 2024-04-09 PROCEDURE — 99285 EMERGENCY DEPT VISIT HI MDM: CPT | Mod: 25

## 2024-04-09 PROCEDURE — 80048 BASIC METABOLIC PNL TOTAL CA: CPT | Mod: XB | Performed by: PHYSICIAN ASSISTANT

## 2024-04-09 PROCEDURE — 25000003 PHARM REV CODE 250

## 2024-04-09 PROCEDURE — 84295 ASSAY OF SERUM SODIUM: CPT

## 2024-04-09 PROCEDURE — 84443 ASSAY THYROID STIM HORMONE: CPT | Performed by: INTERNAL MEDICINE

## 2024-04-09 PROCEDURE — 81001 URINALYSIS AUTO W/SCOPE: CPT

## 2024-04-09 PROCEDURE — 84484 ASSAY OF TROPONIN QUANT: CPT | Mod: 91

## 2024-04-09 PROCEDURE — 80053 COMPREHEN METABOLIC PANEL: CPT | Performed by: EMERGENCY MEDICINE

## 2024-04-09 PROCEDURE — 82803 BLOOD GASES ANY COMBINATION: CPT

## 2024-04-09 PROCEDURE — 86803 HEPATITIS C AB TEST: CPT | Performed by: PHYSICIAN ASSISTANT

## 2024-04-09 PROCEDURE — 63600175 PHARM REV CODE 636 W HCPCS

## 2024-04-09 PROCEDURE — 99213 OFFICE O/P EST LOW 20 MIN: CPT | Mod: ,,, | Performed by: NURSE PRACTITIONER

## 2024-04-09 PROCEDURE — 5A09357 ASSISTANCE WITH RESPIRATORY VENTILATION, LESS THAN 24 CONSECUTIVE HOURS, CONTINUOUS POSITIVE AIRWAY PRESSURE: ICD-10-PCS | Performed by: INTERNAL MEDICINE

## 2024-04-09 PROCEDURE — 93010 ELECTROCARDIOGRAM REPORT: CPT | Mod: 76,,, | Performed by: INTERNAL MEDICINE

## 2024-04-09 PROCEDURE — 36600 WITHDRAWAL OF ARTERIAL BLOOD: CPT

## 2024-04-09 PROCEDURE — 96374 THER/PROPH/DIAG INJ IV PUSH: CPT | Mod: 59

## 2024-04-09 PROCEDURE — 84132 ASSAY OF SERUM POTASSIUM: CPT

## 2024-04-09 PROCEDURE — 36415 COLL VENOUS BLD VENIPUNCTURE: CPT

## 2024-04-09 PROCEDURE — 63600175 PHARM REV CODE 636 W HCPCS: Performed by: PHYSICIAN ASSISTANT

## 2024-04-09 PROCEDURE — 84484 ASSAY OF TROPONIN QUANT: CPT

## 2024-04-09 PROCEDURE — 93005 ELECTROCARDIOGRAM TRACING: CPT

## 2024-04-09 PROCEDURE — 83735 ASSAY OF MAGNESIUM: CPT | Performed by: PHYSICIAN ASSISTANT

## 2024-04-09 PROCEDURE — 83605 ASSAY OF LACTIC ACID: CPT

## 2024-04-09 PROCEDURE — 84100 ASSAY OF PHOSPHORUS: CPT | Performed by: PHYSICIAN ASSISTANT

## 2024-04-09 RX ORDER — ALUMINUM HYDROXIDE, MAGNESIUM HYDROXIDE, AND SIMETHICONE 1200; 120; 1200 MG/30ML; MG/30ML; MG/30ML
30 SUSPENSION ORAL 4 TIMES DAILY PRN
Status: DISCONTINUED | OUTPATIENT
Start: 2024-04-09 | End: 2024-04-12 | Stop reason: HOSPADM

## 2024-04-09 RX ORDER — NIFEDIPINE 30 MG/1
60 TABLET, EXTENDED RELEASE ORAL 2 TIMES DAILY
Status: DISCONTINUED | OUTPATIENT
Start: 2024-04-09 | End: 2024-04-11

## 2024-04-09 RX ORDER — CHOLECALCIFEROL (VITAMIN D3) 25 MCG
1000 TABLET ORAL 2 TIMES DAILY
Status: DISCONTINUED | OUTPATIENT
Start: 2024-04-09 | End: 2024-04-12 | Stop reason: HOSPADM

## 2024-04-09 RX ORDER — SODIUM CHLORIDE 9 MG/ML
INJECTION, SOLUTION INTRAVENOUS ONCE
Status: CANCELLED | OUTPATIENT
Start: 2024-04-10 | End: 2024-04-10

## 2024-04-09 RX ORDER — SEVELAMER CARBONATE 800 MG/1
800 TABLET, FILM COATED ORAL
Status: DISCONTINUED | OUTPATIENT
Start: 2024-04-09 | End: 2024-04-10

## 2024-04-09 RX ORDER — FLUTICASONE PROPIONATE 50 MCG
1 SPRAY, SUSPENSION (ML) NASAL DAILY
Status: DISCONTINUED | OUTPATIENT
Start: 2024-04-10 | End: 2024-04-12

## 2024-04-09 RX ORDER — ALUMINUM HYDROXIDE, MAGNESIUM HYDROXIDE, AND SIMETHICONE 1200; 120; 1200 MG/30ML; MG/30ML; MG/30ML
30 SUSPENSION ORAL
Status: DISCONTINUED | OUTPATIENT
Start: 2024-04-09 | End: 2024-04-09

## 2024-04-09 RX ORDER — PARICALCITOL 1 UG/1
1 CAPSULE, LIQUID FILLED ORAL
Status: DISCONTINUED | OUTPATIENT
Start: 2024-04-10 | End: 2024-04-12 | Stop reason: HOSPADM

## 2024-04-09 RX ORDER — NITROGLYCERIN 0.4 MG/1
0.4 TABLET SUBLINGUAL EVERY 5 MIN PRN
Status: DISCONTINUED | OUTPATIENT
Start: 2024-04-09 | End: 2024-04-12 | Stop reason: HOSPADM

## 2024-04-09 RX ORDER — TALC
6 POWDER (GRAM) TOPICAL NIGHTLY PRN
Status: DISCONTINUED | OUTPATIENT
Start: 2024-04-09 | End: 2024-04-12 | Stop reason: HOSPADM

## 2024-04-09 RX ORDER — NICARDIPINE HYDROCHLORIDE 0.2 MG/ML
0-15 INJECTION INTRAVENOUS CONTINUOUS
Status: DISCONTINUED | OUTPATIENT
Start: 2024-04-09 | End: 2024-04-11

## 2024-04-09 RX ORDER — BISACODYL 10 MG/1
10 SUPPOSITORY RECTAL DAILY PRN
Status: DISCONTINUED | OUTPATIENT
Start: 2024-04-09 | End: 2024-04-12 | Stop reason: HOSPADM

## 2024-04-09 RX ORDER — FAMOTIDINE 20 MG/1
20 TABLET, FILM COATED ORAL 2 TIMES DAILY
Status: DISCONTINUED | OUTPATIENT
Start: 2024-04-09 | End: 2024-04-09

## 2024-04-09 RX ORDER — ATORVASTATIN CALCIUM 40 MG/1
80 TABLET, FILM COATED ORAL DAILY
Status: DISCONTINUED | OUTPATIENT
Start: 2024-04-10 | End: 2024-04-12 | Stop reason: HOSPADM

## 2024-04-09 RX ORDER — POLYETHYLENE GLYCOL 3350 17 G/17G
17 POWDER, FOR SOLUTION ORAL 2 TIMES DAILY PRN
Status: DISCONTINUED | OUTPATIENT
Start: 2024-04-09 | End: 2024-04-12 | Stop reason: HOSPADM

## 2024-04-09 RX ORDER — SODIUM CHLORIDE 0.9 % (FLUSH) 0.9 %
10 SYRINGE (ML) INJECTION EVERY 12 HOURS PRN
Status: DISCONTINUED | OUTPATIENT
Start: 2024-04-09 | End: 2024-04-12 | Stop reason: HOSPADM

## 2024-04-09 RX ORDER — TACROLIMUS 1 MG/1
3 CAPSULE ORAL 2 TIMES DAILY
Status: DISCONTINUED | OUTPATIENT
Start: 2024-04-09 | End: 2024-04-12 | Stop reason: HOSPADM

## 2024-04-09 RX ORDER — GABAPENTIN 100 MG/1
100 CAPSULE ORAL
Status: DISCONTINUED | OUTPATIENT
Start: 2024-04-09 | End: 2024-04-12 | Stop reason: HOSPADM

## 2024-04-09 RX ORDER — GLUCAGON 1 MG
1 KIT INJECTION
Status: DISCONTINUED | OUTPATIENT
Start: 2024-04-09 | End: 2024-04-12 | Stop reason: HOSPADM

## 2024-04-09 RX ORDER — HEPARIN SODIUM,PORCINE/D5W 25000/250
0-40 INTRAVENOUS SOLUTION INTRAVENOUS CONTINUOUS
Status: DISPENSED | OUTPATIENT
Start: 2024-04-09 | End: 2024-04-12

## 2024-04-09 RX ORDER — AMIODARONE HYDROCHLORIDE 200 MG/1
200 TABLET ORAL DAILY
Status: DISCONTINUED | OUTPATIENT
Start: 2024-04-09 | End: 2024-04-12 | Stop reason: HOSPADM

## 2024-04-09 RX ORDER — IBUPROFEN 200 MG
24 TABLET ORAL
Status: DISCONTINUED | OUTPATIENT
Start: 2024-04-09 | End: 2024-04-12 | Stop reason: HOSPADM

## 2024-04-09 RX ORDER — HYDROCODONE BITARTRATE AND ACETAMINOPHEN 5; 325 MG/1; MG/1
1 TABLET ORAL EVERY 6 HOURS PRN
Status: DISCONTINUED | OUTPATIENT
Start: 2024-04-09 | End: 2024-04-12 | Stop reason: HOSPADM

## 2024-04-09 RX ORDER — IBUPROFEN 200 MG
16 TABLET ORAL
Status: DISCONTINUED | OUTPATIENT
Start: 2024-04-09 | End: 2024-04-12 | Stop reason: HOSPADM

## 2024-04-09 RX ORDER — HYDROCODONE BITARTRATE AND ACETAMINOPHEN 5; 325 MG/1; MG/1
1 TABLET ORAL EVERY 6 HOURS PRN
Status: DISCONTINUED | OUTPATIENT
Start: 2024-04-09 | End: 2024-04-09

## 2024-04-09 RX ORDER — PROCHLORPERAZINE EDISYLATE 5 MG/ML
5 INJECTION INTRAMUSCULAR; INTRAVENOUS EVERY 6 HOURS PRN
Status: DISCONTINUED | OUTPATIENT
Start: 2024-04-09 | End: 2024-04-12 | Stop reason: HOSPADM

## 2024-04-09 RX ORDER — IPRATROPIUM BROMIDE AND ALBUTEROL SULFATE 2.5; .5 MG/3ML; MG/3ML
3 SOLUTION RESPIRATORY (INHALATION) EVERY 4 HOURS PRN
Status: DISCONTINUED | OUTPATIENT
Start: 2024-04-09 | End: 2024-04-12 | Stop reason: HOSPADM

## 2024-04-09 RX ORDER — HYDRALAZINE HYDROCHLORIDE 20 MG/ML
10 INJECTION INTRAMUSCULAR; INTRAVENOUS ONCE
Status: DISCONTINUED | OUTPATIENT
Start: 2024-04-09 | End: 2024-04-09

## 2024-04-09 RX ORDER — FUROSEMIDE 10 MG/ML
80 INJECTION INTRAMUSCULAR; INTRAVENOUS ONCE
Status: COMPLETED | OUTPATIENT
Start: 2024-04-09 | End: 2024-04-09

## 2024-04-09 RX ORDER — FLUTICASONE FUROATE AND VILANTEROL 100; 25 UG/1; UG/1
1 POWDER RESPIRATORY (INHALATION) DAILY
Status: DISCONTINUED | OUTPATIENT
Start: 2024-04-10 | End: 2024-04-12 | Stop reason: HOSPADM

## 2024-04-09 RX ORDER — MUPIROCIN 20 MG/G
OINTMENT TOPICAL 2 TIMES DAILY
Status: DISCONTINUED | OUTPATIENT
Start: 2024-04-10 | End: 2024-04-12 | Stop reason: HOSPADM

## 2024-04-09 RX ORDER — PREDNISONE 5 MG/1
5 TABLET ORAL DAILY
Status: DISCONTINUED | OUTPATIENT
Start: 2024-04-10 | End: 2024-04-12 | Stop reason: HOSPADM

## 2024-04-09 RX ORDER — HYDRALAZINE HYDROCHLORIDE 50 MG/1
50 TABLET, FILM COATED ORAL 3 TIMES DAILY
Status: DISCONTINUED | OUTPATIENT
Start: 2024-04-09 | End: 2024-04-10

## 2024-04-09 RX ORDER — PANTOPRAZOLE SODIUM 40 MG/1
40 TABLET, DELAYED RELEASE ORAL DAILY
Status: DISCONTINUED | OUTPATIENT
Start: 2024-04-10 | End: 2024-04-12 | Stop reason: HOSPADM

## 2024-04-09 RX ORDER — ONDANSETRON 8 MG/1
8 TABLET, ORALLY DISINTEGRATING ORAL EVERY 8 HOURS PRN
Status: DISCONTINUED | OUTPATIENT
Start: 2024-04-09 | End: 2024-04-12 | Stop reason: HOSPADM

## 2024-04-09 RX ORDER — NALOXONE HCL 0.4 MG/ML
0.02 VIAL (ML) INJECTION
Status: DISCONTINUED | OUTPATIENT
Start: 2024-04-09 | End: 2024-04-12 | Stop reason: HOSPADM

## 2024-04-09 RX ORDER — DOXAZOSIN 2 MG/1
4 TABLET ORAL EVERY 12 HOURS
Status: DISCONTINUED | OUTPATIENT
Start: 2024-04-09 | End: 2024-04-12 | Stop reason: HOSPADM

## 2024-04-09 RX ORDER — CLOPIDOGREL BISULFATE 75 MG/1
300 TABLET ORAL ONCE
Status: COMPLETED | OUTPATIENT
Start: 2024-04-09 | End: 2024-04-09

## 2024-04-09 RX ORDER — CLOPIDOGREL BISULFATE 75 MG/1
75 TABLET ORAL DAILY
Status: DISCONTINUED | OUTPATIENT
Start: 2024-04-10 | End: 2024-04-12 | Stop reason: HOSPADM

## 2024-04-09 RX ORDER — HYDROCODONE BITARTRATE AND ACETAMINOPHEN 10; 325 MG/1; MG/1
1 TABLET ORAL EVERY 6 HOURS PRN
Status: DISCONTINUED | OUTPATIENT
Start: 2024-04-09 | End: 2024-04-12 | Stop reason: HOSPADM

## 2024-04-09 RX ORDER — ACETAMINOPHEN 325 MG/1
650 TABLET ORAL EVERY 4 HOURS PRN
Status: DISCONTINUED | OUTPATIENT
Start: 2024-04-09 | End: 2024-04-12 | Stop reason: HOSPADM

## 2024-04-09 RX ORDER — SIMETHICONE 80 MG
1 TABLET,CHEWABLE ORAL 4 TIMES DAILY PRN
Status: DISCONTINUED | OUTPATIENT
Start: 2024-04-09 | End: 2024-04-12 | Stop reason: HOSPADM

## 2024-04-09 RX ADMIN — DOXAZOSIN 4 MG: 2 TABLET ORAL at 09:04

## 2024-04-09 RX ADMIN — TACROLIMUS 3 MG: 1 CAPSULE ORAL at 07:04

## 2024-04-09 RX ADMIN — SEVELAMER CARBONATE 800 MG: 800 TABLET, FILM COATED ORAL at 07:04

## 2024-04-09 RX ADMIN — CHOLECALCIFEROL TAB 25 MCG (1000 UNIT) 1000 UNITS: 25 TAB at 09:04

## 2024-04-09 RX ADMIN — NIFEDIPINE 60 MG: 60 TABLET, FILM COATED, EXTENDED RELEASE ORAL at 09:04

## 2024-04-09 RX ADMIN — HYDRALAZINE HYDROCHLORIDE 50 MG: 50 TABLET ORAL at 09:04

## 2024-04-09 RX ADMIN — CLOPIDOGREL BISULFATE 300 MG: 75 TABLET ORAL at 07:04

## 2024-04-09 RX ADMIN — AMIODARONE HYDROCHLORIDE 200 MG: 200 TABLET ORAL at 05:04

## 2024-04-09 RX ADMIN — FUROSEMIDE 80 MG: 10 INJECTION, SOLUTION INTRAMUSCULAR; INTRAVENOUS at 10:04

## 2024-04-09 NOTE — PLAN OF CARE
SW attempted to meet with pt to conduct initial dpa.  Pt was being moved to another floor.  SW will attempt again at a later time.    Ruth Raphael LMSW  Case Management [facility]  [id number]

## 2024-04-09 NOTE — ED PROVIDER NOTES
Emergency Department Provider Note    Ibrahima Phelps   68 y.o. male   [id number]      4/9/2024       History     This history was obtained from the patient without limitations.      He is a 68-year-old with the below past medical history who presents by ambulance.  He complains of severe chest pain that began at around 08:30 while he was sitting down watching television.  The onset was sudden.  The pain is located in the lower right parasternal region of the chest.  He describes it as burning.  He has associated dizziness, shortness of breath, and nausea.  He had little to no relief with 2 sublingual nitroglycerin tablets.  He took four 81 mg aspirin tablets at the direction of EMS.  He has a history of coronary artery disease and has had multiple coronary stents placed, most recently last month.  There are no exacerbating factors for symptoms. He received HD on MWF. His last treatment was 3 days ago.         Past Medical History:   Diagnosis Date    Atrial fibrillation     CAD (coronary artery disease) 2006    MI in 2006    CHF (congestive heart failure) 2017    CKD (chronic kidney disease) stage 3, GFR 30-59 ml/min     Dr. Latif.  in transplanted kidney    CKD (chronic kidney disease) stage 5, GFR less than 15 ml/min 02/02/2024    COVID-19 02/07/2023    Diverticulosis     Hyperlipidemia     Hypertension     Keloid cicatrix     Metabolic bone disease     Other emphysema 10/01/2019    Pericarditis     S/P kidney transplant 1992    x2 (1992 & 2005),    Thrombocytopenia     Thyroid disease     Tobacco use 09/05/2014      Past Surgical History:   Procedure Laterality Date    AV FISTULA PLACEMENT Left 12/18/2023    Procedure: CREATION, AV FISTULA;  Surgeon: JUANI Melendez III, MD;  Location: [facility] OR [address] [place] [address];  Service: Vascular;  Laterality: Left;  LUE AVF creation vs AVG insertion    CARDIOVERSION  04/30/15    CHOLECYSTECTOMY      COLONOSCOPY  April 20, 2011    Diverticulosis, repeat recommended in 3 yrs., repeat  "colonoscopy 2014 revealed 2 polyps.  He should return in 5 years.    COLONOSCOPY N/A 3/13/2020    Procedure: COLONOSCOPY;  Surgeon: Chon Casper MD;  Location: [facility] (4TH FLR);  Service: Endoscopy;  Laterality: N/A;  ok to hold coumadin x5days-see telephone encounter 2/4/20-tb    COLONOSCOPY N/A 2/4/2021    Procedure: COLONOSCOPY;  Surgeon: Chon Casper MD;  Location: [facility] (4TH FLR);  Service: Endoscopy;  Laterality: N/A;  Eliquis - per Dr. GAVIN Blunt ok to hold x 2 days and "restarted asap"- ERW  last prep "poor", [id number] ordered/ Pt requests Dr. Casper  prep ins. mailed - COVID screening on 2/1/21 PCW- ERW    COLONOSCOPY N/A 3/3/2021    Procedure: COLONOSCOPY;  Surgeon: Chon Casper MD;  Location: [facility] (4TH FLR);  Service: Endoscopy;  Laterality: N/A;  Patient with his second poor bowel pre and has poor prep today.  If patient not intersted or can't get colonoscopy tomorrow will need constipation bowel prep and will need to restart his Eliquis today.Thanks,Chon     per Dr Blunt-ok to hold Eliquis 2 days prior      COVID     CORONARY ANGIOGRAPHY N/A 2/28/2024    Procedure: ANGIOGRAM, CORONARY ARTERY;  Surgeon: Tylor Lincoln MD;  Location: [facility] CATH LAB;  Service: Cardiology;  Laterality: N/A;    CORONARY ANGIOPLASTY WITH STENT PLACEMENT  9/13/2006    FISTULOGRAM N/A 2/19/2024    Procedure: Fistulogram;  Surgeon: JUANI Melendez III, MD;  Location: [facility] CATH LAB;  Service: Peripheral Vascular;  Laterality: N/A;    IRRIGATION AND DEBRIDEMENT Right 8/30/2023    Procedure: IRRIGATION AND DEBRIDEMENT, RIGHT MIDDLE FINGER;  Surgeon: Martin Larsen MD;  Location: [facility] OR 2ND FLR;  Service: Orthopedics;  Laterality: Right;    KIDNEY TRANSPLANT  2005    LEFT HEART CATHETERIZATION N/A 2/26/2024    Procedure: Left heart cath;  Surgeon: Tylor Lincoln MD;  Location: [facility] CATH LAB;  Service: Cardiology;  Laterality: N/A;    PARATHYROIDECTOMY      PERCUTANEOUS TRANSLUMINAL " ANGIOPLASTY OF ARTERIOVENOUS FISTULA Left 2024    Procedure: PTA, AV FISTULA;  Surgeon: JUANI Melendez III, MD;  Location: [facility] C[facility];  Service: Peripheral Vascular;  Laterality: Left;    STENT, DRUG ELUTING, SINGLE VESSEL, CORONARY N/A 2024    Procedure: Stent, Drug Eluting, Single Vessel, Coronary;  Surgeon: Tylor Lincoln MD;  Location: [facility] CATH LAB;  Service: Cardiology;  Laterality: N/A;    TREATMENT OF CARDIAC ARRHYTHMIA N/A 3/28/2022    Procedure: CARDIOVERSION;  Surgeon: Migue Blunt MD;  Location: [facility] EP LAB;  Service: Cardiology;  Laterality: N/A;  AF, DCC, MAC, GP, 3 PREP*RODRIGO deferred pt 100% compliant*      Family History   Problem Relation Age of Onset    No Known Problems Sister     No Known Problems Brother     Thyroid disease Mother         s/p surgery    Heart disease Father         had pacemaker    No Known Problems Sister     Kidney failure Sister     Kidney disease Sister     No Known Problems Sister     Kidney disease Brother     Kidney failure Brother         s/p transplant    Diabetes Mellitus Neg Hx     Stroke Neg Hx     Heart attack Neg Hx     Cancer Neg Hx     Celiac disease Neg Hx     Cirrhosis Neg Hx     Colon cancer Neg Hx     Esophageal cancer Neg Hx     Inflammatory bowel disease Neg Hx     Rectal cancer Neg Hx     Stomach cancer Neg Hx     Ulcerative colitis Neg Hx     Liver disease Neg Hx     Liver cancer Neg Hx     Crohn's disease Neg Hx     Melanoma Neg Hx       Social History     Socioeconomic History    Marital status:    Occupational History    Occupation:    Tobacco Use    Smoking status: Every Day     Current packs/day: 0.00     Average packs/day: 0.5 packs/day for 40.0 years (20.0 ttl pk-yrs)     Types: Cigarettes     Start date: 1982     Last attempt to quit: 2022     Years since quittin.1    Smokeless tobacco: Never    Tobacco comments:     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      Patient is currently retired   Substance and Sexual Activity    Alcohol use: No    Drug use: No    Sexual activity: Yes     Partners: Female   Social History Narrative    Retired      Social Determinants of Health     Financial Resource Strain: Medium Risk (2/25/2024)    Overall Financial Resource Strain (CARDIA)     Difficulty of Paying Living Expenses: Somewhat hard   Food Insecurity: No Food Insecurity (2/25/2024)    Hunger Vital Sign     Worried About Running Out of Food in the Last Year: Never true     Ran Out of Food in the Last Year: Never true   Transportation Needs: No Transportation Needs (2/25/2024)    PRAPARE - Transportation     Lack of Transportation (Medical): No     Lack of Transportation (Non-Medical): No   Physical Activity: Inactive (2/25/2024)    Exercise Vital Sign     Days of Exercise per Week: 0 days     Minutes of Exercise per Session: 0 min   Stress: No Stress Concern Present (2/25/2024)    [employer] - Occupational Stress Questionnaire     Feeling of Stress : Only a little   Social Connections: Moderately Integrated (2/25/2024)    Social Connection and Isolation Panel [NHANES]     Frequency of Communication with Friends and Family: More than three times a week     Frequency of Social Gatherings with Friends and Family: More than three times a week     Attends Sabianist Services: More than 4 times per year     Active Member of Clubs or Organizations: No     Attends Club or Organization Meetings: Never     Marital Status:    Housing Stability: Low Risk  (2/25/2024)    Housing Stability Vital Sign     Unable to Pay for Housing in the Last Year: No     Number of Places Lived in the Last Year: 1     Unstable Housing in the Last Year: No      Review of patient's allergies indicates:   Allergen Reactions    Ace inhibitors Swelling    Verapamil Other (See Comments)     Other reaction(s): Unknown    Povidone-iodine Itching           Physical  Examination     Initial Vitals [04/09/24 1017]   BP Pulse Resp Temp SpO2   (!) 107/56 (!) 125 (!) 22 98.2 °F (36.8 °C) 100 %      MAP       --           Physical Exam    Nursing note and vitals reviewed.  Constitutional: He is not diaphoretic. He appears distressed.   Cardiovascular:  Normal heart sounds. An irregularly irregular rhythm present.   Tachycardia present.   Exam reveals no gallop and no friction rub.       No murmur heard.  Pulses:       Radial pulses are 2+ on the right side and 2+ on the left side.   Pulmonary/Chest: Effort normal and breath sounds normal. No stridor. No respiratory distress. He has no decreased breath sounds. He has no wheezes. He has no rhonchi. He has no rales.     Abdominal: Abdomen is soft. He exhibits no distension. There is no abdominal tenderness.   Musculoskeletal:         General: No edema.        Back:      Neurological: He is alert and oriented to person, place, and time. GCS score is 15. GCS eye subscore is 4. GCS verbal subscore is 5. GCS motor subscore is 6.   Skin: Skin is warm and dry. No pallor.   No abnormal chest, abdomen, or back.   Psychiatric: He is not actively hallucinating. He is attentive.            Labs     Labs Reviewed   CBC W/ AUTO DIFFERENTIAL - Abnormal; Notable for the following components:       Result Value    RBC 3.30 (*)     Hemoglobin 8.2 (*)     Hematocrit 27.2 (*)     MCH 24.8 (*)     MCHC 30.1 (*)     RDW 17.4 (*)     Gran % 75.3 (*)     Lymph % 15.8 (*)     All other components within normal limits   COMPREHENSIVE METABOLIC PANEL - Abnormal; Notable for the following components:    CO2 20 (*)     BUN 29 (*)     Creatinine 5.8 (*)     Calcium 8.4 (*)     Albumin 3.0 (*)     eGFR 9.9 (*)     All other components within normal limits   TROPONIN I - Abnormal; Notable for the following components:    Troponin I 0.035 (*)     All other components within normal limits   B-TYPE NATRIURETIC PEPTIDE - Abnormal; Notable for the following components:     BNP 3,075 (*)     All other components within normal limits   HEPATITIS C ANTIBODY    Narrative:     Release to patient->Immediate   HEMOGLOBIN A1C   TROPONIN I   POCT TROPONIN   POCT TROPONIN        Imaging     Imaging Results              X-Ray Chest AP Portable (Final result)  Result time 04/09/24 13:10:19      Final result by Noe Hammond MD (04/09/24 13:10:19)                   Impression:      1. Interstitial findings may reflect edema, improved since the previous examination.  Other interstitial pneumonitis could present in this fashion.  Correlation is advised.      Electronically signed by: Noe Hammond MD  Date:    04/09/2024  Time:    13:10               Narrative:    EXAMINATION:  XR CHEST AP PORTABLE    CLINICAL HISTORY:  Chest Pain;    TECHNIQUE:  Single frontal view of the chest was performed.    COMPARISON:  02/23/2024    FINDINGS:  The cardiomediastinal silhouette is prominent, magnified by technique noting calcification of the aorta..  There is obscuration of the left costophrenic angle, may reflect small effusion..  The trachea is midline.  The lungs are symmetrically expanded bilaterally with prominent interstitial attenuation bilaterally, improved since the previous exam.  There is bilateral basilar subsegmental atelectasis..  No large focal consolidation seen.  There are calcified granuloma.  There is no pneumothorax.  The osseous structures are remarkable for degenerative change..                                        ED Course     The patient received the following medications:  Medications - No data to display    Procedures    ED Course as of 04/09/24 1424   Tue Apr 09, 2024   1232 WBC: 6.22 [LP]   1232 Hemoglobin(!): 8.2  7.8 on 3/23 [LP]   1232 Hematocrit(!): 27.2 [LP]   1232 Platelet Count: 291 [LP]   1330 EKG 12-lead  Independently interpreted by me:   Time of study: 4/9/24 12:50  Normal sinus rhythm.  Ventricular rate 64 bpm.  Normal axis.  Intermediate QRS.  Prolonged QTc  (507 ms).  No ST segment elevation or depression. [LP]   1333 CO2(!): 20 [LP]   1334 BUN(!): 29 [LP]   1334 Creatinine(!): 5.8  ESRD, failed renal transplant, on HD [LP]   1334 Calcium(!): 8.4 [LP]   1334 Albumin(!): 3.0 [LP]   1335 Troponin I(!): 0.035 [LP]   1335 BNP(!): 3,075 [LP]   1339 He is chest pain free. He did not receive Maalox. [LP]   1351 EKG 12-lead  Independently interpreted by me:  Time of study: 4/9/24 10:22  Atrial fibrillation with rapid ventricular response.  Ventricular rate 115 bpm.  Normal axis.  Intermediate QRS (104 ms).  Prolonged QTc (506 ms).  No ST segment elevation.  Lateral ST depression. [LP]      ED Course User Index  [LP] Noam Obrien III, MD        Medical Decision Making                 Medical Decision Making  Differential diagnoses:  Arrhythmia, acute coronary syndromes, heart failure exacerbation, pneumonia, pericarditis, pericardial effusion    Patient in atrial fibrillation with rapid ventricular response on arrival.  He converted to sinus rhythm without treatment.  Interstitial findings concerning for pulmonary edema on chest x-ray but improved from recent admission.  Initial troponin mildly elevated.  We will need to trend given his history.  BNP markedly elevated but exam not convincing for volume overload.  Patient admitted to Hospital Medicine for further management.    Problems Addressed:  Acute chest pain: acute illness or injury  Atrial fibrillation with rapid ventricular response: acute illness or injury    Amount and/or Complexity of Data Reviewed  Labs:  Decision-making details documented in ED Course.  ECG/medicine tests:  Decision-making details documented in ED Course.              Diagnoses       ICD-10-CM ICD-9-CM   1. Acute chest pain  R07.9 786.50   2. Atrial fibrillation with rapid ventricular response  I48.91 427.31         Dispostion      ED Disposition Condition    Observation Stable             Noam Obrien III, MD  04/09/24 [id number]

## 2024-04-09 NOTE — H&P
[facility] - Observation [address] [address] Medicine  History & Physical    Patient Name: Ibrahima Phelps  MRN: [id number]  Patient Class: OP- Observation  Admission Date: 4/9/2024  Attending Physician: Nigel Turner MD   Primary Care Provider: Anatoliy Colindres MD         Patient information was obtained from patient, spouse/SO, past medical records, and ER records.     Subjective:     Principal Problem:Chest pain    Chief Complaint:   Chief Complaint   Patient presents with    Chest Pain     Onset 0830 hx of afib        HPI: Mr. Phelps is a 68 y.o. M with pAF, tachy-loan syndrome, HFpEF with most recent LVEF 65%, CAD s/p PCI 2006 and recent PCI to RCA w/2 DIEGO 02/28/24, HTN, HLD, aortic atherosclerosis, secondary hyperparathyroidism, diverticulosis, COPD, GERD, hypothyroidism, gout, a history of tobacco use, obesity, ESRD s/p kidney transplant x2 (1992, 2005) on HD TTHS and on prednisone & tacrolimus taper who presents with chest pain. Patient states that upon waking he developed 9/10 achy chest pain, with associated palpitations, SOB, diaphoresis, and dizziness. His heart rate was in the 120s on his pulse ox. He attempted treatment with 2 SL NTG without improvement in chest pain or palpitations. His wife reports that she called 911, who instructed her to give him 81 mg aspirin x3. He states that his chest pain persisted following the aspirin, and resolved spontaneously when he arrived to the ED. He endorses mild intermittent nausea without vomiting and denies fever, chills, active chest pain, palpitations, cough, abdominal pain, dysuria, leg swelling or pain, syncope or recent sick contacts. Patient states that he missed dialysis this AM due to presenting to the hospital.       In the ED: EKG with NSR. No ST elevation or depression. Troponin elevated 0.035. CBC with Hgb 8.2. Hematocrit 27.2. BUN 29. Cr 5.8. eGFR 9.9. BNP 3,075. CXR with Interstitial findings may reflect edema, improved since the previous examination.  "Other interstitial pneumonitis could present in this fashion. Correlation is advised. Admitted to hospital medicine for further management.     Past Medical History:   Diagnosis Date    Atrial fibrillation     CAD (coronary artery disease) 2006    MI in 2006    CHF (congestive heart failure) 2017    CKD (chronic kidney disease) stage 3, GFR 30-59 ml/min     Dr. aLtif.  in transplanted kidney    CKD (chronic kidney disease) stage 5, GFR less than 15 ml/min 02/02/2024    COVID-19 02/07/2023    Diverticulosis     Hyperlipidemia     Hypertension     Keloid cicatrix     Metabolic bone disease     Other emphysema 10/01/2019    Pericarditis     S/P kidney transplant 1992    x2 (1992 & 2005),    Thrombocytopenia     Thyroid disease     Tobacco use 09/05/2014       Past Surgical History:   Procedure Laterality Date    AV FISTULA PLACEMENT Left 12/18/2023    Procedure: CREATION, AV FISTULA;  Surgeon: JUANI Melendez III, MD;  Location: [facility] OR 2ND FLR;  Service: Vascular;  Laterality: Left;  LUE AVF creation vs AVG insertion    CARDIOVERSION  04/30/15    CHOLECYSTECTOMY      COLONOSCOPY  April 20, 2011    Diverticulosis, repeat recommended in 3 yrs., repeat colonoscopy 2014 revealed 2 polyps.  He should return in 5 years.    COLONOSCOPY N/A 3/13/2020    Procedure: COLONOSCOPY;  Surgeon: Chon Casper MD;  Location: [facility] (4TH FLR);  Service: Endoscopy;  Laterality: N/A;  ok to hold coumadin x5days-see telephone encounter 2/4/20-tb    COLONOSCOPY N/A 2/4/2021    Procedure: COLONOSCOPY;  Surgeon: Chon Casper MD;  Location: [facility] (4TH FLR);  Service: Endoscopy;  Laterality: N/A;  Eliquis - per Dr. GAVIN Blunt ok to hold x 2 days and "restarted asap"- ERW  last prep "poor", [id number] ordered/ Pt requests Dr. Casper  prep ins. mailed - COVID screening on 2/1/21 PCW- ERW    COLONOSCOPY N/A 3/3/2021    Procedure: COLONOSCOPY;  Surgeon: Chon Casper MD;  Location: [facility] (4TH FLR);  Service: Endoscopy;  " Laterality: N/A;  Patient with his second poor bowel pre and has poor prep today.  If patient not intersted or can't get colonoscopy tomorrow will need constipation bowel prep and will need to restart his Eliquis today.Thanks,Chon     per Dr Lopez to hold Eliquis 2 days prior      COVID     CORONARY ANGIOGRAPHY N/A 2/28/2024    Procedure: ANGIOGRAM, CORONARY ARTERY;  Surgeon: Tylor Lincoln MD;  Location: [facility] CATH LAB;  Service: Cardiology;  Laterality: N/A;    CORONARY ANGIOPLASTY WITH STENT PLACEMENT  9/13/2006    FISTULOGRAM N/A 2/19/2024    Procedure: Fistulogram;  Surgeon: JUANI Melendez III, MD;  Location: [facility] CATH LAB;  Service: Peripheral Vascular;  Laterality: N/A;    IRRIGATION AND DEBRIDEMENT Right 8/30/2023    Procedure: IRRIGATION AND DEBRIDEMENT, RIGHT MIDDLE FINGER;  Surgeon: Martin Larsen MD;  [facility];  Service: Orthopedics;  Laterality: Right;    KIDNEY TRANSPLANT  2005    LEFT HEART CATHETERIZATION N/A 2/26/2024    Procedure: Left heart cath;  Surgeon: Tylor Lincoln MD;  Location: [facility] CATH LAB;  Service: Cardiology;  Laterality: N/A;    PARATHYROIDECTOMY      PERCUTANEOUS TRANSLUMINAL ANGIOPLASTY OF ARTERIOVENOUS FISTULA Left 2/19/2024    Procedure: PTA, AV FISTULA;  Surgeon: JUANI Melendez III, MD;  Location: [facility] CATH LAB;  Service: Peripheral Vascular;  Laterality: Left;    STENT, DRUG ELUTING, SINGLE VESSEL, CORONARY N/A 2/28/2024    Procedure: Stent, Drug Eluting, Single Vessel, Coronary;  Surgeon: Tylor Lincoln MD;  Location: [facility] CATH LAB;  Service: Cardiology;  Laterality: N/A;    TREATMENT OF CARDIAC ARRHYTHMIA N/A 3/28/2022    Procedure: CARDIOVERSION;  Surgeon: Migue Blunt MD;  Location: [facility] EP LAB;  Service: Cardiology;  Laterality: N/A;  AF, DCC, MAC, GP, 3 PREP*RODRIGO deferred pt 100% compliant*       Review of patient's allergies indicates:   Allergen Reactions    Ace inhibitors Swelling    Verapamil Other (See Comments)      Other reaction(s): Unknown    Povidone-iodine Itching       No current facility-administered medications on file prior to encounter.     Current Outpatient Medications on File Prior to Encounter   Medication Sig    acetaminophen (TYLENOL) 500 MG tablet Take 1 tablet (500 mg total) by mouth every 6 (six) hours as needed for Pain.    albuterol (VENTOLIN HFA) 90 mcg/actuation inhaler Inhale 2 puffs into the lungs every 6 (six) hours as needed for Wheezing or Shortness of Breath. Rescue    albuterol-ipratropium (DUO-NEB) 2.5 mg-0.5 mg/3 mL nebulizer solution Take 3 mLs by nebulization every 6 (six) hours as needed for Wheezing. Rescue. Can be used in place of albuterol inhaler.    amiodarone (PACERONE) 200 MG Tab Take 1 tablet (200 mg total) by mouth once daily.    apixaban (ELIQUIS) 5 mg Tab Take 1 tablet (5 mg total) by mouth 2 (two) times daily.    aspirin (ECOTRIN) 81 MG EC tablet Take 1 tablet (81 mg total) by mouth once daily.    atorvastatin (LIPITOR) 80 MG tablet Take 1 tablet (80 mg total) by mouth once daily.    b complex vitamins (B COMPLEX-VITAMIN B12) tablet Take 1 tablet by mouth once daily.    calcium carbonate (CALCIUM 600 ORAL) Take 1 tablet by mouth 2 (two) times a day.    CHOLECALCIFEROL, VITAMIN D3, ORAL Take 2 tablets by mouth 2 (two) times daily.    clopidogreL (PLAVIX) 75 mg tablet Take 1 tablet (75 mg total) by mouth once daily.    doxazosin (CARDURA) 4 MG tablet Take 1 tablet (4 mg total) by mouth every 12 (twelve) hours.    famotidine (PEPCID) 20 MG tablet Take 1 tablet (20 mg total) by mouth 2 (two) times daily.    fexofenadine HCl (ALLEGRA ORAL) Take 1 tablet by mouth daily as needed (allergies).    fluticasone furoate-vilanteroL (BREO) 100-25 mcg/dose diskus inhaler Inhale 1 puff into the lungs once daily. Controller. Use this inhaler every day.    fluticasone propionate (FLONASE) 50 mcg/actuation nasal spray 1 spray (50 mcg total) by Each Nostril route daily as needed for Allergies.     FLUZONE HIGHDOSE QUAD 23-24  mcg/0.7 mL Syrg     furosemide (LASIX) 20 MG tablet Take 4 tablets (80 mg total) by mouth 2 (two) times daily as needed. For worsening LE swelling or shortness of breath on non-dialysis days    gabapentin (NEURONTIN) 100 MG capsule Take 1 capsule (100 mg total) by mouth as needed (pain).    hydrALAZINE (APRESOLINE) 50 MG tablet TAKE 1 TABLET (50 MG TOTAL) BY MOUTH 3 (THREE) TIMES DAILY.    HYDROcodone-acetaminophen (NORCO) 5-325 mg per tablet Take 1 tablet by mouth every 6 (six) hours as needed for Pain.    metoprolol succinate (TOPROL-XL) 25 MG 24 hr tablet Take 0.5 tablets (12.5 mg total) by mouth once daily.    MITIGARE 0.6 mg Cap TAKE 1 CAPSULE BY MOUTH TWICE A DAY AS NEEDED GOUT FLARE UP TO 3 DAYS    multivitamin (THERAGRAN) per tablet Take 1 tablet by mouth Daily.    NIFEdipine (PROCARDIA-XL) 60 MG (OSM) 24 hr tablet Take 1 tablet (60 mg total) by mouth 2 (two) times a day.    nitroGLYCERIN (NITROSTAT) 0.4 MG SL tablet Place 1 tablet (0.4 mg total) under the tongue every 5 (five) minutes as needed for Chest pain.    omeprazole (PRILOSEC) 20 MG capsule Take 1 capsule (20 mg total) by mouth daily as needed (heartburn).    paricalcitoL (ZEMPLAR) 1 MCG capsule TAKE 1 CAPSULE ON MONDAY, WEDNESDAY AND FRIDAY    Moven COVID BIVAL,12Y UP,,PF, 30 mcg/0.3 mL injection Inject into the muscle.    predniSONE (DELTASONE) 5 MG tablet Take 1 tablet (5 mg total) by mouth once daily.    pulse oximeter (PULSE OXIMETER) device by Apply Externally route 2 (two) times a day. Use twice daily at 8 AM and 3 PM and record the value in MyChart as directed.    sevelamer carbonate (RENVELA) 800 mg Tab Take 1 tablet (800 mg total) by mouth 3 (three) times daily with meals.    SPIKEVAX [id number],12Y UP,,PF, 50 mcg/0.5 mL injection     suvorexant (BELSOMRA) 5 mg Tab Take 5 mg by mouth nightly as needed (insomnia).    tacrolimus (PROGRAF) 1 MG Cap Take 3 capsules (3 mg total) by mouth every 12 (twelve)  hours for 60 days, THEN 2 capsules (2 mg total) every 12 (twelve) hours for 60 days, THEN 1 capsule (1 mg total) every 12 (twelve) hours for 60 days, THEN 1 capsule (1 mg total) once daily.    tiotropium bromide (SPIRIVA RESPIMAT) 1.25 mcg/actuation inhaler Inhale 2 puffs into the lungs once daily. Controller. Use this inhaler every day.     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     Patient is currently retired   Substance and Sexual Activity    Alcohol use: No    Drug use: No    Sexual activity: Yes     Partners: Female     Review of Systems   Constitutional:  Positive for diaphoresis. Negative for activity change, chills, fatigue and fever.   HENT:  Negative for congestion and trouble swallowing.    Eyes:  Negative for photophobia and visual disturbance.   Respiratory:  Positive for shortness of breath. Negative for cough, chest tightness and wheezing.    Cardiovascular:  Positive for chest pain and palpitations. Negative for leg swelling.   Gastrointestinal:  Positive for nausea. Negative for abdominal distention, abdominal pain, constipation, diarrhea and vomiting.   Genitourinary:  Negative for dysuria, frequency, hematuria and urgency.   Musculoskeletal:  Negative for arthralgias, back pain and gait problem.   Skin:  Negative for color change and rash.   Neurological:  Positive for dizziness. Negative for syncope, weakness, light-headedness,  numbness and headaches.   Psychiatric/Behavioral:  Negative for agitation and confusion. The patient is not nervous/anxious.      Objective:     Vital Signs (Most Recent):  Temp: 98.2 °F (36.8 °C) (04/09/24 1347)  Pulse: 65 (04/09/24 1347)  Resp: 17 (04/09/24 1347)  BP: 136/62 (04/09/24 1347)  SpO2: 100 % (04/09/24 1347) Vital Signs (24h Range):  Temp:  [98.2 °F (36.8 °C)] 98.2 °F (36.8 °C)  Pulse:  [] 65  Resp:  [17-22] 17  SpO2:  [96 %-100 %] 100 %  BP: (107-141)/(56-65) 136/62     Weight: 85 kg (187 lb 6.3 oz)  Body mass index is 24.72 kg/m².     Physical Exam  Vitals and nursing note reviewed.   Constitutional:       General: He is not in acute distress.     Appearance: He is well-developed. He is ill-appearing (chronically). He is not diaphoretic.   HENT:      Head: Normocephalic and atraumatic.      Mouth/Throat:      Mouth: Mucous membranes are moist.   Eyes:      Extraocular Movements: Extraocular movements intact.      Conjunctiva/sclera: Conjunctivae normal.   Cardiovascular:      Rate and Rhythm: Normal rate and regular rhythm.      Heart sounds: Normal heart sounds.      Comments: No JVD  Pulmonary:      Effort: Pulmonary effort is normal. No respiratory distress.      Breath sounds: Rales (faint) present. No wheezing.   Abdominal:      General: Bowel sounds are normal. There is no distension.      Palpations: Abdomen is soft.      Tenderness: There is no abdominal tenderness.   Musculoskeletal:         General: No tenderness. Normal range of motion.      Cervical back: Normal range of motion and neck supple.   Skin:     General: Skin is warm and dry.      Capillary Refill: Capillary refill takes less than 2 seconds.      Findings: No rash.   Neurological:      Mental Status: He is alert and oriented to person, place, and time.      Cranial Nerves: No cranial nerve deficit.      Sensory: No sensory deficit.      Coordination: Coordination normal.   Psychiatric:         Behavior: Behavior normal.          Thought Content: Thought content normal.         Judgment: Judgment normal.                Significant Labs: All pertinent labs within the past 24 hours have been reviewed.  A1C:   Recent Labs   Lab 11/23/23  0819 04/09/24  1157   HGBA1C 5.0 5.0     CBC:   Recent Labs   Lab 04/09/24  1157   WBC 6.22   HGB 8.2*   HCT 27.2*        CMP:   Recent Labs   Lab 04/09/24  1157      K 3.6      CO2 20*   GLU 92   BUN 29*   CREATININE 5.8*   CALCIUM 8.4*   PROT 6.8   ALBUMIN 3.0*   BILITOT 0.6   ALKPHOS 81   AST 16   ALT 17   ANIONGAP 14     Cardiac Markers:   Recent Labs   Lab 04/09/24  1157   BNP 3,075*     Troponin:   Recent Labs   Lab 04/09/24  1157 04/09/24  1419   TROPONINI 0.035* 0.417*       Significant Imaging: I have reviewed all pertinent imaging results/findings within the past 24 hours.  Assessment/Plan:     * Chest pain  A fib with RVR  - 68 y.o. M pAF, tachy-loan syndrome, HFpEF, CAD s/p PCI 2006 & recent PCI to RCA w/2 DIEGO 02/28/24, HTN, HLD, aortic atherosclerosis, ESRD s/p kidney transplant x2 (1992, 2005) on HD TTHS on immunosuppressives presenting with chest pain, palpitations, nausea, and dizziness with HR in the 120s.   - Unrelieved with nitroglycerin, continue PRN  - ASA 81 mg x 3 administered prior to arrival   - Found to be in AF with RVR with EMS, on arrival tachy to 125   - EKG in the ED with sinus arrhythmia, no AF, rate 115  - Initial troponin 0.035, repeat 0.417, possibly 2/2 episode of of afib w/ RVR, continue to trend to peak  - BNP 3,075  - CXR demonstrating mild pulmonary edema   - Continue statin, plavix, BB  - NPO at midnight as precuation  - CBC, CMP (goal Mag>2, K>4) daily; lipid panel ordered/reviewed   - Monitor telemetry  - Repeat cho ordered  - Risk factors: age >65, PMHx of CAD/MI, HTN, HLD  - HEART score 7  - If troponin rises, start ACS protocol w/ heparin gtt and consult interventional cardiology  - Cardiology consulted; appreciate assistance     Chronic  heart failure with preserved ejection fraction  Patient is identified as having Diastolic (HFpEF) heart failure that is Chronic. CHF is currently controlled.   Latest ECHO performed and demonstrates-   Echo  Interpretation Summary    Left Ventricle: There is normal systolic function with a visually estimated ejection fraction of 60 - 65%. Grade II diastolic dysfunction. Elevated left ventricular filling pressure.    Right Ventricle: Normal right ventricular cavity size. Wall thickness is normal. Right ventricle wall motion  is normal. Systolic function is normal.    Left Atrium: Left atrium is moderately dilated.    Right Atrium: Right atrium is moderately dilated.    Aortic Valve: The aortic valve is a trileaflet valve. There is moderate aortic valve sclerosis. There is moderate annular calcification present. There is mild to moderate aortic regurgitation. There appers to be mobile echodensoty attached to the base of the non coronary leaflet that is likely nodular calcificaiton and is tthe site of origin of Aortic regurgitation. This appears unchanged from pror TTE. AV leaflets appears thickened in some views cannot exlude presence of endocarditis (clip # 5).    Aortic Valve: Mildly restricted motion. There is mild stenosis. Aortic valve area by VTI is 1.62 cm². Aortic valve peak velocity is 2.62 m/s. Mean gradient is 14 mmHg. The dimensionless index is 0.47.    Mitral Valve: There is moderate mitral annular calcification present. There is mild to moderate regurgitation.    Aorta: Aortic root is normal in size measuring 3.61 cm. Ascending aorta is normal measuring 2.80 cm.    Pulmonary Artery: The estimated pulmonary artery systolic pressure is 49 mmHg.    IVC/SVC: Intermediate venous pressure at 8 mmHg.  - Continue Beta Blocker and monitor clinical status closely.   - Monitor on telemetry.  - Patient is off CHF pathway.   - Monitor strict Is&Os and daily weights. Place on fluid restriction of 1.5 L.   - Continue  to stress to patient importance of self efficacy and  on diet for CHF.  - Last BNP reviewed- and noted below   Recent Labs   Lab 04/09/24  1157   BNP 3,075*   - Volume management with PRN lasix and HD     GERD (gastroesophageal reflux disease)  - Continue home protonix    ESRD (end stage renal disease) on dialysis  TThS schedule  Duration: 3.5 hrs  Access: LUE   Residual Renal Function: minimal  Last HD prior to admission: 4/6    - Nephrology consulted  - Continue HD while inpatient   - Monitor daily electrolytes and defer dialysis orders to nephrology     Anemia in stage 5 chronic kidney disease, not on chronic dialysis  Patient's anemia is currently uncontrolled. Has not received any PRBCs to date. Etiology likely d/t chronic disease due to ESRD  Current CBC reviewed-   Lab Results   Component Value Date    HGB 8.2 (L) 04/09/2024    HCT 27.2 (L) 04/09/2024   - Monitor serial CBC and transfuse if patient becomes hemodynamically unstable, symptomatic or H/H drops below 7/21.    Former smoker  - Quit ~1 yr ago, 40+ pack years  - No indication for nicotine patch    Carotid artery disease  - Chronic  - Continue eliquis and plavix    Paroxysmal atrial fibrillation  Long term use of anticoagulants   Patient with Paroxysmal (<7 days) atrial fibrillation which was uncontrolled in RVR with EMS and is now controlled currently with Beta Blocker and Amiodarone. Patient is currently in sinus rhythm.[id number] Score: 2. Anticoagulation indicated. Anticoagulation done with eliquis .  - See chest pain     H/O kidney transplant  Chronic immunosuppression, prophylactic immunotherapy  - Compliant with home prednisone and tacro taper  - Discussed with KTM, no inpatient needs for consult  - Continue home meds    Mixed hyperlipidemia  - Continue statin     CAD (coronary artery disease)  Patient with known CAD s/p stent placement, which is uncontrolled Will continue Plavix and Statin and monitor for S/Sx of angina/ACS. Continue to  monitor on telemetry.     Primary hypertension  Hypertensive heart and kidney disease   Chronic, controlled. Latest blood pressure and vitals reviewed-     Temp:  [98.2 °F (36.8 °C)]   Pulse:  []   Resp:  [17-22]   BP: (107-141)/(56-65)   SpO2:  [96 %-100 %] .   Home meds for hypertension were reviewed and noted below.   Hypertension Medications               doxazosin (CARDURA) 4 MG tablet Take 1 tablet (4 mg total) by mouth every 12 (twelve) hours.    furosemide (LASIX) 20 MG tablet Take 4 tablets (80 mg total) by mouth 2 (two) times daily as needed. For worsening LE swelling or shortness of breath on non-dialysis days    hydrALAZINE (APRESOLINE) 50 MG tablet TAKE 1 TABLET (50 MG TOTAL) BY MOUTH 3 (THREE) TIMES DAILY.    metoprolol succinate (TOPROL-XL) 25 MG 24 hr tablet Take 0.5 tablets (12.5 mg total) by mouth once daily.    NIFEdipine (PROCARDIA-XL) 60 MG (OSM) 24 hr tablet Take 1 tablet (60 mg total) by mouth 2 (two) times a day.    nitroGLYCERIN (NITROSTAT) 0.4 MG SL tablet Place 1 tablet (0.4 mg total) under the tongue every 5 (five) minutes as needed for Chest pain.     While in the hospital, will manage blood pressure as follows; Continue home antihypertensive regimen    Will utilize p.r.n. blood pressure medication only if patient's blood pressure greater than 180/110 and he develops symptoms such as worsening chest pain or shortness of breath.      VTE Risk Mitigation (From admission, onward)           Ordered     apixaban tablet 5 mg  2 times daily         04/09/24 1536     IP VTE HIGH RISK PATIENT  Once         04/09/24 1536     Place sequential compression device  Until discontinued         04/09/24 1536                         On 04/09/2024, patient should be placed in hospital observation services under my care in collaboration with Dr. Nigel Turner.           Trish Robles PA-C  Department of Hospital Medicine  [facility] - Observation 11H

## 2024-04-09 NOTE — ASSESSMENT & PLAN NOTE
TThS schedule  Duration: 3.5 hrs  Access: LUE   Residual Renal Function: minimal  Last HD prior to admission: 4/6    - Nephrology consulted  - Continue HD while inpatient   - Monitor daily electrolytes and defer dialysis orders to nephrology

## 2024-04-09 NOTE — ASSESSMENT & PLAN NOTE
A fib with RVR  - 68 y.o. M pAF, tachy-loan syndrome, HFpEF, CAD s/p PCI 2006 & recent PCI to RCA w/2 DIEGO 02/28/24, HTN, HLD, aortic atherosclerosis, ESRD s/p kidney transplant x2 (1992, 2005) on HD TTHS on immunosuppressives presenting with chest pain, palpitations, nausea, and dizziness with HR in the 120s.   - Unrelieved with nitroglycerin, continue PRN  - ASA 81 mg x 3 administered prior to arrival   - Found to be in AF with RVR with EMS, on arrival tachy to 125   - EKG in the ED with sinus arrhythmia, no AF, rate 115  - Initial troponin 0.035, repeat 0.417, possibly 2/2 episode of of afib w/ RVR, continue to trend to peak  - BNP 3,075  - CXR demonstrating mild pulmonary edema   - Continue statin, plavix, BB  - NPO at midnight as precuation  - CBC, CMP (goal Mag>2, K>4) daily; lipid panel ordered/reviewed   - Monitor telemetry  - Repeat cho ordered  - Risk factors: age >65, PMHx of CAD/MI, HTN, HLD  - HEART score 7  - If troponin rises, start ACS protocol w/ heparin gtt and consult interventional cardiology  - Cardiology consulted; appreciate assistance

## 2024-04-09 NOTE — ASSESSMENT & PLAN NOTE
ESRD on iHD TTS  [facility] [place]  ?EDW  MIRA AVF  + residual renal fx    Plan/Recommendations:  -No emergent need for RRT today  -will re-evaluate tomorrow for further needs  -RFP daily  -strict I/O's  -will defer EPO to his OP dialysis unit  -add on phos level, on home phos binder

## 2024-04-09 NOTE — CONSULTS
[facility] - Emergency Dept  Nephrology  Consult Note    Patient Name: Ibrahima Phelps  MRN: [id number]  Admission Date: 4/9/2024  Hospital Length of Stay: 0 days  Attending Provider: Nigel Turner MD   Primary Care Physician: Anatoliy Colindres MD  Principal Problem:Chest pain    Inpatient consult to Nephrology  Consult performed by: Noe Jeffery, NP  Consult ordered by: Trish Robles, CORI  Reason for consult: ESRD        Subjective:     HPI: Ibrahima Phelps is a 69 yo male with CAD s/p PCI, HTN, HLD, HFpEF, AFib, hypothyroidism, and ESRD s/p KTx x 2 (remains on immunosuppressant medications) who presents to the ED via EMS on 4/9 for evaluation of CP located to R chest.  He described the pain as burning associated with SOB, nausea and dizziness.  Upon arrival to the ED he was found to be in AFib with RVR which self converted and symptoms improved with GI cocktail.  His last HD session was on Saturday and was scheduled to have today but reported to the ED instead.  Chemistries without emergent electrolyte abnormalities, trop 0.035, BNP 3075.  He denies any complaints at time of consultation.  Nephrology has been consulted for ESRD management while IP.        Past Medical History:   Diagnosis Date    Atrial fibrillation     CAD (coronary artery disease) 2006    MI in 2006    CHF (congestive heart failure) 2017    CKD (chronic kidney disease) stage 3, GFR 30-59 ml/min     Dr. Latif.  in transplanted kidney    CKD (chronic kidney disease) stage 5, GFR less than 15 ml/min 02/02/2024    COVID-19 02/07/2023    Diverticulosis     Hyperlipidemia     Hypertension     Keloid cicatrix     Metabolic bone disease     Other emphysema 10/01/2019    Pericarditis     S/P kidney transplant 1992    x2 (1992 & 2005),    Thrombocytopenia     Thyroid disease     Tobacco use 09/05/2014       Past Surgical History:   Procedure Laterality Date    AV FISTULA PLACEMENT Left 12/18/2023    Procedure: CREATION, AV FISTULA;  Surgeon:  "JUANI Melendez III, MD;  [facility];  Service: Vascular;  Laterality: Left;  LUE AVF creation vs AVG insertion    CARDIOVERSION  04/30/15    CHOLECYSTECTOMY      COLONOSCOPY  April 20, 2011    Diverticulosis, repeat recommended in 3 yrs., repeat colonoscopy 2014 revealed 2 polyps.  He should return in 5 years.    COLONOSCOPY N/A 3/13/2020    Procedure: COLONOSCOPY;  Surgeon: Chon Casper MD;  Location: [facility] ENDO (4TH FLR);  Service: Endoscopy;  Laterality: N/A;  ok to hold coumadin x5days-see telephone encounter 2/4/20-tb    COLONOSCOPY N/A 2/4/2021    Procedure: COLONOSCOPY;  Surgeon: Chon Casper MD;  Location: [facility] ENDO (4TH FLR);  Service: Endoscopy;  Laterality: N/A;  Eliquis - per Dr. GAVIN Blunt ok to hold x 2 days and "restarted asap"- ERW  last prep "poor", [id number] ordered/ Pt requests Dr. Casper  prep ins. mailed - COVID screening on 2/1/21 PCW- ERW    COLONOSCOPY N/A 3/3/2021    Procedure: COLONOSCOPY;  Surgeon: Chon Casper MD;  Location: [facility] ENDO (4TH FLR);  Service: Endoscopy;  Laterality: N/A;  Patient with his second poor bowel pre and has poor prep today.  If patient not intersted or can't get colonoscopy tomorrow will need constipation bowel prep and will need to restart his Eliquis today.Thanks,Chon     per Dr Blunt-ok to hold Eliquis 2 days prior      COVID     CORONARY ANGIOGRAPHY N/A 2/28/2024    Procedure: ANGIOGRAM, CORONARY ARTERY;  Surgeon: Tylor Lincoln MD;  Location: [facility] CATH LAB;  Service: Cardiology;  Laterality: N/A;    CORONARY ANGIOPLASTY WITH STENT PLACEMENT  9/13/2006    FISTULOGRAM N/A 2/19/2024    Procedure: Fistulogram;  Surgeon: JUANI Melendez III, MD;  Location: [facility] CATH LAB;  Service: Peripheral Vascular;  Laterality: N/A;    IRRIGATION AND DEBRIDEMENT Right 8/30/2023    Procedure: IRRIGATION AND DEBRIDEMENT, RIGHT MIDDLE FINGER;  Surgeon: Martin Larsen MD;  Location: [facility] OR 2ND FLR;  Service: Orthopedics;  Laterality: " Right;    KIDNEY TRANSPLANT  2005    LEFT HEART CATHETERIZATION N/A 2/26/2024    Procedure: Left heart cath;  Surgeon: Tylor Lincoln MD;  Location: [facility] CATH LAB;  Service: Cardiology;  Laterality: N/A;    PARATHYROIDECTOMY      PERCUTANEOUS TRANSLUMINAL ANGIOPLASTY OF ARTERIOVENOUS FISTULA Left 2/19/2024    Procedure: PTA, AV FISTULA;  Surgeon: JUANI Melendez III, MD;  Location: [facility] CATH LAB;  Service: Peripheral Vascular;  Laterality: Left;    STENT, DRUG ELUTING, SINGLE VESSEL, CORONARY N/A 2/28/2024    Procedure: Stent, Drug Eluting, Single Vessel, Coronary;  Surgeon: Tylor Lincoln MD;  Location: [facility] CATH LAB;  Service: Cardiology;  Laterality: N/A;    TREATMENT OF CARDIAC ARRHYTHMIA N/A 3/28/2022    Procedure: CARDIOVERSION;  Surgeon: Migue Blunt MD;  Location: [facility] EP LAB;  Service: Cardiology;  Laterality: N/A;  AF, DCC, MAC, GP, 3 PREP*RODRIGO deferred pt 100% compliant*       Review of patient's allergies indicates:   Allergen Reactions    Ace inhibitors Swelling    Verapamil Other (See Comments)     Other reaction(s): Unknown    Povidone-iodine Itching     Current Facility-Administered Medications   Medication Frequency    acetaminophen tablet 650 mg Q4H PRN    albuterol-ipratropium 2.5 mg-0.5 mg/3 mL nebulizer solution 3 mL Q4H PRN    aluminum-magnesium hydroxide-simethicone 200-200-20 mg/5 mL suspension 30 mL QID PRN    amiodarone tablet 200 mg Daily    apixaban tablet 5 mg BID    [START ON 4/10/2024] atorvastatin tablet 80 mg Daily    bisacodyL suppository 10 mg Daily PRN    [START ON 4/10/2024] clopidogreL tablet 75 mg Daily    dextrose 10% bolus 125 mL 125 mL PRN    dextrose 10% bolus 250 mL 250 mL PRN    doxazosin tablet 4 mg Q12H    famotidine tablet 20 mg BID    [START ON 4/10/2024] fluticasone furoate-vilanteroL 100-25 mcg/dose diskus inhaler 1 puff Daily    [START ON 4/10/2024] fluticasone propionate 50 mcg/actuation nasal spray 50 mcg Daily    gabapentin capsule 100 mg PRN     glucagon (human recombinant) injection 1 mg PRN    glucose chewable tablet 16 g PRN    glucose chewable tablet 24 g PRN    hydrALAZINE tablet 50 mg TID    HYDROcodone-acetaminophen  mg per tablet 1 tablet Q6H PRN    HYDROcodone-acetaminophen 5-325 mg per tablet 1 tablet Q6H PRN    HYDROcodone-acetaminophen 5-325 mg per tablet 1 tablet Q6H PRN    melatonin tablet 6 mg Nightly PRN    [START ON 4/10/2024] metoprolol succinate (TOPROL-XL) 24 hr split tablet 12.5 mg Daily    [START ON 4/10/2024] multivitamin tablet Daily    naloxone 0.4 mg/mL injection 0.02 mg PRN    NIFEdipine 24 hr tablet 60 mg BID    nitroGLYCERIN SL tablet 0.4 mg Q5 Min PRN    ondansetron disintegrating tablet 8 mg Q8H PRN    [START ON 4/10/2024] pantoprazole EC tablet 40 mg Daily    [START ON 4/10/2024] paricalcitoL capsule 1 mcg Every Mon, Wed, Fri    polyethylene glycol packet 17 g BID PRN    [START ON 4/10/2024] predniSONE tablet 5 mg Daily    prochlorperazine injection Soln 5 mg Q6H PRN    sevelamer carbonate tablet 800 mg TID WM    simethicone chewable tablet 80 mg QID PRN    sodium chloride 0.9% flush 10 mL Q12H PRN    tacrolimus capsule 3 mg BID    [START ON 4/10/2024] tiotropium bromide 1.25 mcg/actuation inhaler 2 puff Daily    vitamin D 1000 units tablet 1,000 Units BID     Current Outpatient Medications   Medication    acetaminophen (TYLENOL) 500 MG tablet    albuterol (VENTOLIN HFA) 90 mcg/actuation inhaler    albuterol-ipratropium (DUO-NEB) 2.5 mg-0.5 mg/3 mL nebulizer solution    amiodarone (PACERONE) 200 MG Tab    apixaban (ELIQUIS) 5 mg Tab    aspirin (ECOTRIN) 81 MG EC tablet    atorvastatin (LIPITOR) 80 MG tablet    b complex vitamins (B COMPLEX-VITAMIN B12) tablet    calcium carbonate (CALCIUM 600 ORAL)    CHOLECALCIFEROL, VITAMIN D3, ORAL    clopidogreL (PLAVIX) 75 mg tablet    doxazosin (CARDURA) 4 MG tablet    famotidine (PEPCID) 20 MG tablet    fexofenadine HCl (ALLEGRA ORAL)    fluticasone furoate-vilanteroL (BREO)  100-25 mcg/dose diskus inhaler    fluticasone propionate (FLONASE) 50 mcg/actuation nasal spray    FLUZONE HIGHDOSE QUAD 23-24  mcg/0.7 mL Syrg    furosemide (LASIX) 20 MG tablet    gabapentin (NEURONTIN) 100 MG capsule    hydrALAZINE (APRESOLINE) 50 MG tablet    HYDROcodone-acetaminophen (NORCO) 5-325 mg per tablet    metoprolol succinate (TOPROL-XL) 25 MG 24 hr tablet    MITIGARE 0.6 mg Cap    multivitamin (THERAGRAN) per tablet    NIFEdipine (PROCARDIA-XL) 60 MG (OSM) 24 hr tablet    nitroGLYCERIN (NITROSTAT) 0.4 MG SL tablet    omeprazole (PRILOSEC) 20 MG capsule    paricalcitoL (ZEMPLAR) 1 MCG capsule    PFIZER COVID BIVAL,12Y UP,,PF, 30 mcg/0.3 mL injection    predniSONE (DELTASONE) 5 MG tablet    pulse oximeter (PULSE OXIMETER) device    sevelamer carbonate (RENVELA) 800 mg Tab    SPIKEVAX 1960-2439,12Y UP,,PF, 50 mcg/0.5 mL injection    suvorexant (BELSOMRA) 5 mg Tab    tacrolimus (PROGRAF) 1 MG Cap    tiotropium bromide (SPIRIVA RESPIMAT) 1.25 mcg/actuation inhaler     Family History       Problem Relation (Age of Onset)    Heart disease Father    Kidney disease Sister, Brother    Kidney failure Sister, Brother    No Known Problems Sister, Brother, Sister, Sister    Thyroid disease Mother          Tobacco Use    Smoking status: Every Day     Current packs/day: 0.00     Average packs/day: 0.5 packs/day for 40.0 years (20.0 ttl pk-yrs)     Types: Cigarettes     Start date: 1982     Last attempt to quit: 2022     Years since quittin.1    Smokeless tobacco: Never    Tobacco comments:     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     Patient is currently retired   Substance and Sexual Activity    Alcohol use: No    Drug use: No    Sexual activity: Yes     Partners: Female     Review of Systems   Constitutional:  Positive for fatigue. Negative for activity change, chills and fever.   HENT:  Negative for congestion, facial swelling, postnasal drip,  rhinorrhea, sinus pressure and sinus pain.    Respiratory:  Negative for cough, chest tightness and shortness of breath.    Cardiovascular:  Negative for chest pain, palpitations and leg swelling.   Gastrointestinal:  Negative for abdominal distention, constipation, diarrhea, nausea and vomiting.   Genitourinary:  Positive for decreased urine volume.   Skin:  Negative for color change, rash and wound.   Allergic/Immunologic: Positive for immunocompromised state.   Neurological:  Negative for dizziness, light-headedness and headaches.   Psychiatric/Behavioral:  Negative for agitation, behavioral problems and confusion.      Objective:     Vital Signs (Most Recent):  Temp: 98.2 °F (36.8 °C) (04/09/24 1347)  Pulse: 65 (04/09/24 1347)  Resp: 17 (04/09/24 1347)  BP: 136/62 (04/09/24 1347)  SpO2: 100 % (04/09/24 1347) Vital Signs (24h Range):  Temp:  [98.2 °F (36.8 °C)] 98.2 °F (36.8 °C)  Pulse:  [] 65  Resp:  [17-22] 17  SpO2:  [96 %-100 %] 100 %  BP: (107-141)/(56-65) 136/62     Weight: 85 kg (187 lb 6.3 oz) (04/09/24 1017)  Body mass index is 24.72 kg/m².  Body surface area is 2.09 meters squared.    No intake/output data recorded.     Physical Exam  Vitals and nursing note reviewed.   Constitutional:       General: He is not in acute distress.     Appearance: Normal appearance. He is not ill-appearing or toxic-appearing.   HENT:      Head: Normocephalic and atraumatic.      Nose: No congestion or rhinorrhea.      Mouth/Throat:      Mouth: Mucous membranes are moist.      Pharynx: No oropharyngeal exudate or posterior oropharyngeal erythema.   Eyes:      General: No scleral icterus.        Right eye: No discharge.         Left eye: No discharge.      Extraocular Movements: Extraocular movements intact.      Conjunctiva/sclera: Conjunctivae normal.   Cardiovascular:      Rate and Rhythm: Normal rate. Rhythm irregular.      Heart sounds: No murmur heard.     No friction rub. No gallop.      Comments: MIRA CORTEZ  ++  Pulmonary:      Effort: Pulmonary effort is normal. No respiratory distress.      Breath sounds: No wheezing or rales.   Abdominal:      General: Bowel sounds are normal. There is no distension.      Palpations: Abdomen is soft.      Tenderness: There is no abdominal tenderness.   Musculoskeletal:         General: No swelling.      Cervical back: Normal range of motion and neck supple.      Right lower leg: No edema.      Left lower leg: No edema.   Skin:     General: Skin is warm and dry.   Neurological:      General: No focal deficit present.      Mental Status: He is alert and oriented to person, place, and time.          Significant Labs:  CBC:   Recent Labs   Lab 04/09/24  1157   WBC 6.22   RBC 3.30*   HGB 8.2*   HCT 27.2*      MCV 82   MCH 24.8*   MCHC 30.1*     CMP:   Recent Labs   Lab 04/09/24  1157   GLU 92   CALCIUM 8.4*   ALBUMIN 3.0*   PROT 6.8      K 3.6   CO2 20*      BUN 29*   CREATININE 5.8*   ALKPHOS 81   ALT 17   AST 16   BILITOT 0.6       Assessment/Plan:     Cardiac/Vascular  * Chest pain  -Resolved @ time of consultation  -Trop 0.035      Renal/  ESRD (end stage renal disease) on dialysis  ESRD on [facility]HD TTS  [facility]  Dr. Gambino  ?EDW  MIRA AVF  + residual renal fx    Plan/Recommendations:  -No emergent need for RRT today  -will re-evaluate tomorrow for further needs  -RFP daily  -strict I/O's  -will defer EPO to his OP dialysis unit  -add on phos level, on home phos binder    Kidney replaced by transplant  -immunosuppressants management by [facility]  -on home prednisone/tacro      Noe Shankar NP  Nephrology  [facility] - Emergency Dept

## 2024-04-09 NOTE — ASSESSMENT & PLAN NOTE
Patient's anemia is currently uncontrolled. Has not received any PRBCs to date. Etiology likely d/t chronic disease due to ESRD  Current CBC reviewed-   Lab Results   Component Value Date    HGB 8.2 (L) 04/09/2024    HCT 27.2 (L) 04/09/2024   - Monitor serial CBC and transfuse if patient becomes hemodynamically unstable, symptomatic or H/H drops below 7/21.

## 2024-04-09 NOTE — SUBJECTIVE & OBJECTIVE
"Past Medical History:   Diagnosis Date    Atrial fibrillation     CAD (coronary artery disease) 2006    MI in 2006    CHF (congestive heart failure) 2017    CKD (chronic kidney disease) stage 3, GFR 30-59 ml/min     Dr. Latif.  in transplanted kidney    CKD (chronic kidney disease) stage 5, GFR less than 15 ml/min 02/02/2024    COVID-19 02/07/2023    Diverticulosis     Hyperlipidemia     Hypertension     Keloid cicatrix     Metabolic bone disease     Other emphysema 10/01/2019    Pericarditis     S/P kidney transplant 1992    x2 (1992 & 2005),    Thrombocytopenia     Thyroid disease     Tobacco use 09/05/2014       Past Surgical History:   Procedure Laterality Date    AV FISTULA PLACEMENT Left 12/18/2023    Procedure: CREATION, AV FISTULA;  Surgeon: JUANI Melendez III, MD;  [facility];  Service: Vascular;  Laterality: Left;  LUE AVF creation vs AVG insertion    CARDIOVERSION  04/30/15    CHOLECYSTECTOMY      COLONOSCOPY  April 20, 2011    Diverticulosis, repeat recommended in 3 yrs., repeat colonoscopy 2014 revealed 2 polyps.  He should return in 5 years.    COLONOSCOPY N/A 3/13/2020    Procedure: COLONOSCOPY;  Surgeon: Chon Casper MD;  Location: [facility] (4TH FLR);  Service: Endoscopy;  Laterality: N/A;  ok to hold coumadin x5days-see telephone encounter 2/4/20-tb    COLONOSCOPY N/A 2/4/2021    Procedure: COLONOSCOPY;  Surgeon: Chon Casper MD;  Location: [facility] (4TH FLR);  Service: Endoscopy;  Laterality: N/A;  Eliquis - per Dr. GAVIN Blunt ok to hold x 2 days and "restarted asap"- ERW  last prep "poor", [id number] ordered/ Pt requests Dr. Casper  prep ins. mailed - COVID screening on 2/1/21 PCW- ERW    COLONOSCOPY N/A 3/3/2021    Procedure: COLONOSCOPY;  Surgeon: Chon Casper MD;  Location: [facility] (4TH FLR);  Service: Endoscopy;  Laterality: N/A;  Patient with his second poor bowel pre and has poor prep today.  If patient not intersted or can't get colonoscopy tomorrow will " need constipation bowel prep and will need to restart his Eliquis today.Thanks,Chon     per Dr Lopez to hold Eliquis 2 days prior      COVID     CORONARY ANGIOGRAPHY N/A 2/28/2024    Procedure: ANGIOGRAM, CORONARY ARTERY;  Surgeon: Tylor Lincoln MD;  Location: [facility] CATH LAB;  Service: Cardiology;  Laterality: N/A;    CORONARY ANGIOPLASTY WITH STENT PLACEMENT  9/13/2006    FISTULOGRAM N/A 2/19/2024    Procedure: Fistulogram;  Surgeon: JUANI Melendez III, MD;  Location: [facility] CATH LAB;  Service: Peripheral Vascular;  Laterality: N/A;    IRRIGATION AND DEBRIDEMENT Right 8/30/2023    Procedure: IRRIGATION AND DEBRIDEMENT, RIGHT MIDDLE FINGER;  Surgeon: Martin Larsen MD;  [facility];  Service: Orthopedics;  Laterality: Right;    KIDNEY TRANSPLANT  2005    LEFT HEART CATHETERIZATION N/A 2/26/2024    Procedure: Left heart cath;  Surgeon: Tylor Lincoln MD;  Location: [facility] CATH LAB;  Service: Cardiology;  Laterality: N/A;    PARATHYROIDECTOMY      PERCUTANEOUS TRANSLUMINAL ANGIOPLASTY OF ARTERIOVENOUS FISTULA Left 2/19/2024    Procedure: PTA, AV FISTULA;  Surgeon: JUANI Melendez III, MD;  Location: [facility] CATH LAB;  Service: Peripheral Vascular;  Laterality: Left;    STENT, DRUG ELUTING, SINGLE VESSEL, CORONARY N/A 2/28/2024    Procedure: Stent, Drug Eluting, Single Vessel, Coronary;  Surgeon: Tylor Lincoln MD;  Location: [facility] CATH LAB;  Service: Cardiology;  Laterality: N/A;    TREATMENT OF CARDIAC ARRHYTHMIA N/A 3/28/2022    Procedure: CARDIOVERSION;  Surgeon: Migue Blunt MD;  Location: [facility] EP LAB;  Service: Cardiology;  Laterality: N/A;  AF, DCC, MAC, GP, 3 PREP*RODRIGO deferred pt 100% compliant*       Review of patient's allergies indicates:   Allergen Reactions    Ace inhibitors Swelling    Verapamil Other (See Comments)     Other reaction(s): Unknown    Povidone-iodine Itching     Current Facility-Administered Medications   Medication Frequency    acetaminophen tablet 650  mg Q4H PRN    albuterol-ipratropium 2.5 mg-0.5 mg/3 mL nebulizer solution 3 mL Q4H PRN    aluminum-magnesium hydroxide-simethicone 200-200-20 mg/5 mL suspension 30 mL QID PRN    amiodarone tablet 200 mg Daily    apixaban tablet 5 mg BID    [START ON 4/10/2024] atorvastatin tablet 80 mg Daily    bisacodyL suppository 10 mg Daily PRN    [START ON 4/10/2024] clopidogreL tablet 75 mg Daily    dextrose 10% bolus 125 mL 125 mL PRN    dextrose 10% bolus 250 mL 250 mL PRN    doxazosin tablet 4 mg Q12H    famotidine tablet 20 mg BID    [START ON 4/10/2024] fluticasone furoate-vilanteroL 100-25 mcg/dose diskus inhaler 1 puff Daily    [START ON 4/10/2024] fluticasone propionate 50 mcg/actuation nasal spray 50 mcg Daily    gabapentin capsule 100 mg PRN    glucagon (human recombinant) injection 1 mg PRN    glucose chewable tablet 16 g PRN    glucose chewable tablet 24 g PRN    hydrALAZINE tablet 50 mg TID    HYDROcodone-acetaminophen  mg per tablet 1 tablet Q6H PRN    HYDROcodone-acetaminophen 5-325 mg per tablet 1 tablet Q6H PRN    HYDROcodone-acetaminophen 5-325 mg per tablet 1 tablet Q6H PRN    melatonin tablet 6 mg Nightly PRN    [START ON 4/10/2024] metoprolol succinate (TOPROL-XL) 24 hr split tablet 12.5 mg Daily    [START ON 4/10/2024] multivitamin tablet Daily    naloxone 0.4 mg/mL injection 0.02 mg PRN    NIFEdipine 24 hr tablet 60 mg BID    nitroGLYCERIN SL tablet 0.4 mg Q5 Min PRN    ondansetron disintegrating tablet 8 mg Q8H PRN    [START ON 4/10/2024] pantoprazole EC tablet 40 mg Daily    [START ON 4/10/2024] paricalcitoL capsule 1 mcg Every Mon, Wed, Fri    polyethylene glycol packet 17 g BID PRN    [START ON 4/10/2024] predniSONE tablet 5 mg Daily    prochlorperazine injection Soln 5 mg Q6H PRN    sevelamer carbonate tablet 800 mg TID WM    simethicone chewable tablet 80 mg QID PRN    sodium chloride 0.9% flush 10 mL Q12H PRN    tacrolimus capsule 3 mg BID    [START ON 4/10/2024] tiotropium bromide 1.25  mcg/actuation inhaler 2 puff Daily    vitamin D 1000 units tablet 1,000 Units BID     Current Outpatient Medications   Medication    acetaminophen (TYLENOL) 500 MG tablet    albuterol (VENTOLIN HFA) 90 mcg/actuation inhaler    albuterol-ipratropium (DUO-NEB) 2.5 mg-0.5 mg/3 mL nebulizer solution    amiodarone (PACERONE) 200 MG Tab    apixaban (ELIQUIS) 5 mg Tab    aspirin (ECOTRIN) 81 MG EC tablet    atorvastatin (LIPITOR) 80 MG tablet    b complex vitamins (B COMPLEX-VITAMIN B12) tablet    calcium carbonate (CALCIUM 600 ORAL)    CHOLECALCIFEROL, VITAMIN D3, ORAL    clopidogreL (PLAVIX) 75 mg tablet    doxazosin (CARDURA) 4 MG tablet    famotidine (PEPCID) 20 MG tablet    fexofenadine HCl (ALLEGRA ORAL)    fluticasone furoate-vilanteroL (BREO) 100-25 mcg/dose diskus inhaler    fluticasone propionate (FLONASE) 50 mcg/actuation nasal spray    FLUZONE HIGHDOSE QUAD 23-24  mcg/0.7 mL Syrg    furosemide (LASIX) 20 MG tablet    gabapentin (NEURONTIN) 100 MG capsule    hydrALAZINE (APRESOLINE) 50 MG tablet    HYDROcodone-acetaminophen (NORCO) 5-325 mg per tablet    metoprolol succinate (TOPROL-XL) 25 MG 24 hr tablet    MITIGARE 0.6 mg Cap    multivitamin (THERAGRAN) per tablet    NIFEdipine (PROCARDIA-XL) 60 MG (OSM) 24 hr tablet    nitroGLYCERIN (NITROSTAT) 0.4 MG SL tablet    omeprazole (PRILOSEC) 20 MG capsule    paricalcitoL (ZEMPLAR) 1 MCG capsule    PFIZER COVID BIVAL,12Y UP,,PF, 30 mcg/0.3 mL injection    predniSONE (DELTASONE) 5 MG tablet    pulse oximeter (PULSE OXIMETER) device    sevelamer carbonate (RENVELA) 800 mg Tab    SPIKEVAX 2521-5931,12Y UP,,PF, 50 mcg/0.5 mL injection    suvorexant (BELSOMRA) 5 mg Tab    tacrolimus (PROGRAF) 1 MG Cap    tiotropium bromide (SPIRIVA RESPIMAT) 1.25 mcg/actuation inhaler     Family History       Problem Relation (Age of Onset)    Heart disease Father    Kidney disease Sister, Brother    Kidney failure Sister, Brother    No Known Problems Sister, Brother, Sister,  Sister    Thyroid disease Mother          Tobacco Use    Smoking status: Every Day     Current packs/day: 0.00     Average packs/day: 0.5 packs/day for 40.0 years (20.0 ttl pk-yrs)     Types: Cigarettes     Start date: 1982     Last attempt to quit: 2022     Years since quittin.1    Smokeless tobacco: Never    Tobacco comments:     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     Patient is currently retired   Substance and Sexual Activity    Alcohol use: No    Drug use: No    Sexual activity: Yes     Partners: Female     Review of Systems   Constitutional:  Positive for fatigue. Negative for activity change, chills and fever.   HENT:  Negative for congestion, facial swelling, postnasal drip, rhinorrhea, sinus pressure and sinus pain.    Respiratory:  Negative for cough, chest tightness and shortness of breath.    Cardiovascular:  Negative for chest pain, palpitations and leg swelling.   Gastrointestinal:  Negative for abdominal distention, constipation, diarrhea, nausea and vomiting.   Genitourinary:  Positive for decreased urine volume.   Skin:  Negative for color change, rash and wound.   Allergic/Immunologic: Positive for immunocompromised state.   Neurological:  Negative for dizziness, light-headedness and headaches.   Psychiatric/Behavioral:  Negative for agitation, behavioral problems and confusion.      Objective:     Vital Signs (Most Recent):  Temp: 98.2 °F (36.8 °C) (24 1347)  Pulse: 65 (24 1347)  Resp: 17 (24 1347)  BP: 136/62 (24 1347)  SpO2: 100 % (24 1347) Vital Signs (24h Range):  Temp:  [98.2 °F (36.8 °C)] 98.2 °F (36.8 °C)  Pulse:  [] 65  Resp:  [17-22] 17  SpO2:  [96 %-100 %] 100 %  BP: (107-141)/(56-65) 136/62     Weight: 85 kg (187 lb 6.3 oz) (24 1017)  Body mass index is 24.72 kg/m².  Body surface area is 2.09 meters squared.    No intake/output data recorded.     Physical Exam  Vitals and nursing note  reviewed.   Constitutional:       General: He is not in acute distress.     Appearance: Normal appearance. He is not ill-appearing or toxic-appearing.   HENT:      Head: Normocephalic and atraumatic.      Nose: No congestion or rhinorrhea.      Mouth/Throat:      Mouth: Mucous membranes are moist.      Pharynx: No oropharyngeal exudate or posterior oropharyngeal erythema.   Eyes:      General: No scleral icterus.        Right eye: No discharge.         Left eye: No discharge.      Extraocular Movements: Extraocular movements intact.      Conjunctiva/sclera: Conjunctivae normal.   Cardiovascular:      Rate and Rhythm: Normal rate. Rhythm irregular.      Heart sounds: No murmur heard.     No friction rub. No gallop.      Comments: MIRA AVF ++  Pulmonary:      Effort: Pulmonary effort is normal. No respiratory distress.      Breath sounds: No wheezing or rales.   Abdominal:      General: Bowel sounds are normal. There is no distension.      Palpations: Abdomen is soft.      Tenderness: There is no abdominal tenderness.   Musculoskeletal:         General: No swelling.      Cervical back: Normal range of motion and neck supple.      Right lower leg: No edema.      Left lower leg: No edema.   Skin:     General: Skin is warm and dry.   Neurological:      General: No focal deficit present.      Mental Status: He is alert and oriented to person, place, and time.          Significant Labs:  CBC:   Recent Labs   Lab 04/09/24  1157   WBC 6.22   RBC 3.30*   HGB 8.2*   HCT 27.2*      MCV 82   MCH 24.8*   MCHC 30.1*     CMP:   Recent Labs   Lab 04/09/24  1157   GLU 92   CALCIUM 8.4*   ALBUMIN 3.0*   PROT 6.8      K 3.6   CO2 20*      BUN 29*   CREATININE 5.8*   ALKPHOS 81   ALT 17   AST 16   BILITOT 0.6

## 2024-04-09 NOTE — ASSESSMENT & PLAN NOTE
Patient with known CAD s/p stent placement, which is uncontrolled Will continue Plavix and Statin and monitor for S/Sx of angina/ACS. Continue to monitor on telemetry.

## 2024-04-09 NOTE — ED TRIAGE NOTES
Pt. Is a 68 yr old -American male presenting to the ED with chest pain.  Post stents x 1 month. Hx A-Fib

## 2024-04-09 NOTE — NURSING
Nurses Note -- 4 Eyes      4/9/2024   5:06 PM      Skin assessed during: Admit      [x] No Altered Skin Integrity Present    []Prevention Measures Documented      [] Yes- Altered Skin Integrity Present or Discovered   [] LDA Added if Not in Epic (Describe Wound)   [] New Altered Skin Integrity was Present on Admit and Documented in LDA   [] Wound Image Taken    Wound Care Consulted? No    Attending Nurse:  Danay Underwood RN/Staff Member:   Grabiel

## 2024-04-09 NOTE — HPI
Ibrahima Phelps is a 67 yo male with CAD s/p PCI, HTN, HLD, HFpEF, AFib, hypothyroidism, and ESRD s/p KTx x 2 (remains on immunosuppressant medications) who presents to the ED via EMS on 4/9 for evaluation of CP located to R chest.  He described the pain as burning associated with SOB, nausea and dizziness.  Upon arrival to the ED he was found to be in AFib with RVR which self converted and symptoms improved with GI cocktail.  His last HD session was on Saturday and was scheduled to have today but reported to the ED instead.  Chemistries without emergent electrolyte abnormalities, trop 0.035, BNP 3075.  He denies any complaints at time of consultation.  Nephrology has been consulted for ESRD management while IP.

## 2024-04-09 NOTE — PROVIDER PROGRESS NOTES - EMERGENCY DEPT.
Encounter Date: 4/9/2024    ED Physician Progress Notes         EKG - STEMI Decision  Initial Reading: No STEMI present.  Progress Note - STEMI Response: AFib.

## 2024-04-09 NOTE — HPI
Mr. Phelps is a 68 y.o. M with pAF, tachy-loan syndrome, HFpEF with most recent LVEF 65%, CAD s/p PCI 2006 and recent PCI to RCA w/2 DIEGO 02/28/24, HTN, HLD, aortic atherosclerosis, secondary hyperparathyroidism, diverticulosis, COPD, GERD, hypothyroidism, gout, a history of tobacco use, obesity, ESRD s/p kidney transplant x2 (1992, 2005) on HD TTHS and on prednisone & tacrolimus taper who presents with chest pain. Patient states that upon waking he developed 9/10 achy chest pain, with associated palpitations, SOB, diaphoresis, and dizziness. His heart rate was in the 120s on his pulse ox. He attempted treatment with 2 SL NTG without improvement in chest pain or palpitations. His wife reports that she called 911, who instructed her to give him 81 mg aspirin x3. He states that his chest pain persisted following the aspirin, and resolved spontaneously when he arrived to the ED. He endorses mild intermittent nausea without vomiting and denies fever, chills, active chest pain, palpitations, cough, abdominal pain, dysuria, leg swelling or pain, syncope or recent sick contacts. Patient states that he missed dialysis this AM due to presenting to the hospital.       In the ED: EKG with NSR. No ST elevation or depression. Troponin elevated 0.035. CBC with Hgb 8.2. Hematocrit 27.2. BUN 29. Cr 5.8. eGFR 9.9. BNP 3,075. CXR with Interstitial findings may reflect edema, improved since the previous examination. Other interstitial pneumonitis could present in this fashion. Correlation is advised. Admitted to hospital medicine for further management.    Patient states premedication antibiotics were taken prior to today's appointment.  Pt requested a refill of premeds and it was sent to preferred pharmacy as requested.

## 2024-04-09 NOTE — ASSESSMENT & PLAN NOTE
Patient is identified as having Diastolic (HFpEF) heart failure that is Chronic. CHF is currently controlled.   Latest ECHO performed and demonstrates-   Echo  Interpretation Summary    Left Ventricle: There is normal systolic function with a visually estimated ejection fraction of 60 - 65%. Grade II diastolic dysfunction. Elevated left ventricular filling pressure.    Right Ventricle: Normal right ventricular cavity size. Wall thickness is normal. Right ventricle wall motion  is normal. Systolic function is normal.    Left Atrium: Left atrium is moderately dilated.    Right Atrium: Right atrium is moderately dilated.    Aortic Valve: The aortic valve is a trileaflet valve. There is moderate aortic valve sclerosis. There is moderate annular calcification present. There is mild to moderate aortic regurgitation. There appers to be mobile echodensoty attached to the base of the non coronary leaflet that is likely nodular calcificaiton and is tthe site of origin of Aortic regurgitation. This appears unchanged from pror TTE. AV leaflets appears thickened in some views cannot exlude presence of endocarditis (clip # 5).    Aortic Valve: Mildly restricted motion. There is mild stenosis. Aortic valve area by VTI is 1.62 cm². Aortic valve peak velocity is 2.62 m/s. Mean gradient is 14 mmHg. The dimensionless index is 0.47.    Mitral Valve: There is moderate mitral annular calcification present. There is mild to moderate regurgitation.    Aorta: Aortic root is normal in size measuring 3.61 cm. Ascending aorta is normal measuring 2.80 cm.    Pulmonary Artery: The estimated pulmonary artery systolic pressure is 49 mmHg.    IVC/SVC: Intermediate venous pressure at 8 mmHg.  - Continue Beta Blocker and monitor clinical status closely.   - Monitor on telemetry.  - Patient is off CHF pathway.   - Monitor strict Is&Os and daily weights. Place on fluid restriction of 1.5 L.   - Continue to stress to patient importance of self efficacy  and  on diet for CHF.  - Last BNP reviewed- and noted below   Recent Labs   Lab 04/09/24  1157   BNP 3,075*   - Volume management with PRN lasix and HD

## 2024-04-09 NOTE — SUBJECTIVE & OBJECTIVE
"Past Medical History:   Diagnosis Date    Atrial fibrillation     CAD (coronary artery disease) 2006    MI in 2006    CHF (congestive heart failure) 2017    CKD (chronic kidney disease) stage 3, GFR 30-59 ml/min     Dr. Latif.  in transplanted kidney    CKD (chronic kidney disease) stage 5, GFR less than 15 ml/min 02/02/2024    COVID-19 02/07/2023    Diverticulosis     Hyperlipidemia     Hypertension     Keloid cicatrix     Metabolic bone disease     Other emphysema 10/01/2019    Pericarditis     S/P kidney transplant 1992    x2 (1992 & 2005),    Thrombocytopenia     Thyroid disease     Tobacco use 09/05/2014       Past Surgical History:   Procedure Laterality Date    AV FISTULA PLACEMENT Left 12/18/2023    Procedure: CREATION, AV FISTULA;  Surgeon: JUANI Melendez III, MD;  [facility];  Service: Vascular;  Laterality: Left;  LUE AVF creation vs AVG insertion    CARDIOVERSION  04/30/15    CHOLECYSTECTOMY      COLONOSCOPY  April 20, 2011    Diverticulosis, repeat recommended in 3 yrs., repeat colonoscopy 2014 revealed 2 polyps.  He should return in 5 years.    COLONOSCOPY N/A 3/13/2020    Procedure: COLONOSCOPY;  Surgeon: Chon Casper MD;  Location: [facility] (4TH FLR);  Service: Endoscopy;  Laterality: N/A;  ok to hold coumadin x5days-see telephone encounter 2/4/20-tb    COLONOSCOPY N/A 2/4/2021    Procedure: COLONOSCOPY;  Surgeon: Chon Casper MD;  Location: [facility] (4TH FLR);  Service: Endoscopy;  Laterality: N/A;  Eliquis - per Dr. GAVIN Blunt ok to hold x 2 days and "restarted asap"- ERW  last prep "poor", [id number] ordered/ Pt requests Dr. Casper  prep ins. mailed - COVID screening on 2/1/21 PCW- ERW    COLONOSCOPY N/A 3/3/2021    Procedure: COLONOSCOPY;  Surgeon: Chon Casper MD;  Location: [facility] (4TH FLR);  Service: Endoscopy;  Laterality: N/A;  Patient with his second poor bowel pre and has poor prep today.  If patient not intersted or can't get colonoscopy tomorrow will " need constipation bowel prep and will need to restart his Eliquis today.Thanks,Chon     per Dr Lopez to hold Eliquis 2 days prior      COVID     CORONARY ANGIOGRAPHY N/A 2/28/2024    Procedure: ANGIOGRAM, CORONARY ARTERY;  Surgeon: Tylor Lincoln MD;  Location: [facility] CATH LAB;  Service: Cardiology;  Laterality: N/A;    CORONARY ANGIOPLASTY WITH STENT PLACEMENT  9/13/2006    FISTULOGRAM N/A 2/19/2024    Procedure: Fistulogram;  Surgeon: JUANI Melendez III, MD;  Location: [facility] CATH LAB;  Service: Peripheral Vascular;  Laterality: N/A;    IRRIGATION AND DEBRIDEMENT Right 8/30/2023    Procedure: IRRIGATION AND DEBRIDEMENT, RIGHT MIDDLE FINGER;  Surgeon: Martin Larsen MD;  [facility];  Service: Orthopedics;  Laterality: Right;    KIDNEY TRANSPLANT  2005    LEFT HEART CATHETERIZATION N/A 2/26/2024    Procedure: Left heart cath;  Surgeon: Tylor Lincoln MD;  Location: [facility] CATH LAB;  Service: Cardiology;  Laterality: N/A;    PARATHYROIDECTOMY      PERCUTANEOUS TRANSLUMINAL ANGIOPLASTY OF ARTERIOVENOUS FISTULA Left 2/19/2024    Procedure: PTA, AV FISTULA;  Surgeon: JUANI Melendez III, MD;  Location: [facility] CATH LAB;  Service: Peripheral Vascular;  Laterality: Left;    STENT, DRUG ELUTING, SINGLE VESSEL, CORONARY N/A 2/28/2024    Procedure: Stent, Drug Eluting, Single Vessel, Coronary;  Surgeon: Tylor Lincoln MD;  Location: [facility] CATH LAB;  Service: Cardiology;  Laterality: N/A;    TREATMENT OF CARDIAC ARRHYTHMIA N/A 3/28/2022    Procedure: CARDIOVERSION;  Surgeon: Migue Blunt MD;  Location: [facility] EP LAB;  Service: Cardiology;  Laterality: N/A;  AF, DCC, MAC, GP, 3 PREP*RODRIGO deferred pt 100% compliant*       Review of patient's allergies indicates:   Allergen Reactions    Ace inhibitors Swelling    Verapamil Other (See Comments)     Other reaction(s): Unknown    Povidone-iodine Itching       No current facility-administered medications on file prior to encounter.     Current  Outpatient Medications on File Prior to Encounter   Medication Sig    acetaminophen (TYLENOL) 500 MG tablet Take 1 tablet (500 mg total) by mouth every 6 (six) hours as needed for Pain.    albuterol (VENTOLIN HFA) 90 mcg/actuation inhaler Inhale 2 puffs into the lungs every 6 (six) hours as needed for Wheezing or Shortness of Breath. Rescue    albuterol-ipratropium (DUO-NEB) 2.5 mg-0.5 mg/3 mL nebulizer solution Take 3 mLs by nebulization every 6 (six) hours as needed for Wheezing. Rescue. Can be used in place of albuterol inhaler.    amiodarone (PACERONE) 200 MG Tab Take 1 tablet (200 mg total) by mouth once daily.    apixaban (ELIQUIS) 5 mg Tab Take 1 tablet (5 mg total) by mouth 2 (two) times daily.    aspirin (ECOTRIN) 81 MG EC tablet Take 1 tablet (81 mg total) by mouth once daily.    atorvastatin (LIPITOR) 80 MG tablet Take 1 tablet (80 mg total) by mouth once daily.    b complex vitamins (B COMPLEX-VITAMIN B12) tablet Take 1 tablet by mouth once daily.    calcium carbonate (CALCIUM 600 ORAL) Take 1 tablet by mouth 2 (two) times a day.    CHOLECALCIFEROL, VITAMIN D3, ORAL Take 2 tablets by mouth 2 (two) times daily.    clopidogreL (PLAVIX) 75 mg tablet Take 1 tablet (75 mg total) by mouth once daily.    doxazosin (CARDURA) 4 MG tablet Take 1 tablet (4 mg total) by mouth every 12 (twelve) hours.    famotidine (PEPCID) 20 MG tablet Take 1 tablet (20 mg total) by mouth 2 (two) times daily.    fexofenadine HCl (ALLEGRA ORAL) Take 1 tablet by mouth daily as needed (allergies).    fluticasone furoate-vilanteroL (BREO) 100-25 mcg/dose diskus inhaler Inhale 1 puff into the lungs once daily. Controller. Use this inhaler every day.    fluticasone propionate (FLONASE) 50 mcg/actuation nasal spray 1 spray (50 mcg total) by Each Nostril route daily as needed for Allergies.    FLUZONE HIGHDOSE QUAD 23-24  mcg/0.7 mL Syrg     furosemide (LASIX) 20 MG tablet Take 4 tablets (80 mg total) by mouth 2 (two) times daily as  needed. For worsening LE swelling or shortness of breath on non-dialysis days    gabapentin (NEURONTIN) 100 MG capsule Take 1 capsule (100 mg total) by mouth as needed (pain).    hydrALAZINE (APRESOLINE) 50 MG tablet TAKE 1 TABLET (50 MG TOTAL) BY MOUTH 3 (THREE) TIMES DAILY.    HYDROcodone-acetaminophen (NORCO) 5-325 mg per tablet Take 1 tablet by mouth every 6 (six) hours as needed for Pain.    metoprolol succinate (TOPROL-XL) 25 MG 24 hr tablet Take 0.5 tablets (12.5 mg total) by mouth once daily.    MITIGARE 0.6 mg Cap TAKE 1 CAPSULE BY MOUTH TWICE A DAY AS NEEDED GOUT FLARE UP TO 3 DAYS    multivitamin (THERAGRAN) per tablet Take 1 tablet by mouth Daily.    NIFEdipine (PROCARDIA-XL) 60 MG (OSM) 24 hr tablet Take 1 tablet (60 mg total) by mouth 2 (two) times a day.    nitroGLYCERIN (NITROSTAT) 0.4 MG SL tablet Place 1 tablet (0.4 mg total) under the tongue every 5 (five) minutes as needed for Chest pain.    omeprazole (PRILOSEC) 20 MG capsule Take 1 capsule (20 mg total) by mouth daily as needed (heartburn).    paricalcitoL (ZEMPLAR) 1 MCG capsule TAKE 1 CAPSULE ON MONDAY, WEDNESDAY AND FRIDAY    [facility] COVID [facility],12Y UP,,PF, 30 mcg/0.3 mL injection Inject into the muscle.    predniSONE (DELTASONE) 5 MG tablet Take 1 tablet (5 mg total) by mouth once daily.    pulse oximeter (PULSE OXIMETER) device by Apply Externally route 2 (two) times a day. Use twice daily at 8 AM and 3 PM and record the value in MyChart as directed.    sevelamer carbonate (RENVELA) 800 mg Tab Take 1 tablet (800 mg total) by mouth 3 (three) times daily with meals.    SPIKEVAX [id number],12Y UP,,PF, 50 mcg/0.5 mL injection     suvorexant (BELSOMRA) 5 mg Tab Take 5 mg by mouth nightly as needed (insomnia).    tacrolimus (PROGRAF) 1 MG Cap Take 3 capsules (3 mg total) by mouth every 12 (twelve) hours for 60 days, THEN 2 capsules (2 mg total) every 12 (twelve) hours for 60 days, THEN 1 capsule (1 mg total) every 12 (twelve) hours for 60 days,  THEN 1 capsule (1 mg total) once daily.    tiotropium bromide (SPIRIVA RESPIMAT) 1.25 mcg/actuation inhaler Inhale 2 puffs into the lungs once daily. Controller. Use this inhaler every day.     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     Patient is currently retired   Substance and Sexual Activity    Alcohol use: No    Drug use: No    Sexual activity: Yes     Partners: Female     Review of Systems   Constitutional:  Positive for diaphoresis. Negative for activity change, chills, fatigue and fever.   HENT:  Negative for congestion and trouble swallowing.    Eyes:  Negative for photophobia and visual disturbance.   Respiratory:  Positive for shortness of breath. Negative for cough, chest tightness and wheezing.    Cardiovascular:  Positive for chest pain and palpitations. Negative for leg swelling.   Gastrointestinal:  Positive for nausea. Negative for abdominal distention, abdominal pain, constipation, diarrhea and vomiting.   Genitourinary:  Negative for dysuria, frequency, hematuria and urgency.   Musculoskeletal:  Negative for arthralgias, back pain and gait problem.   Skin:  Negative for color change and rash.   Neurological:  Positive for dizziness. Negative for syncope, weakness, light-headedness, numbness and headaches.   Psychiatric/Behavioral:  Negative for agitation and confusion. The patient is not nervous/anxious.      Objective:      Vital Signs (Most Recent):  Temp: 98.2 °F (36.8 °C) (04/09/24 1347)  Pulse: 65 (04/09/24 1347)  Resp: 17 (04/09/24 1347)  BP: 136/62 (04/09/24 1347)  SpO2: 100 % (04/09/24 1347) Vital Signs (24h Range):  Temp:  [98.2 °F (36.8 °C)] 98.2 °F (36.8 °C)  Pulse:  [] 65  Resp:  [17-22] 17  SpO2:  [96 %-100 %] 100 %  BP: (107-141)/(56-65) 136/62     Weight: 85 kg (187 lb 6.3 oz)  Body mass index is 24.72 kg/m².     Physical Exam  Vitals and nursing note reviewed.   Constitutional:       General: He is not in acute distress.     Appearance: He is well-developed. He is ill-appearing (chronically). He is not diaphoretic.   HENT:      Head: Normocephalic and atraumatic.      Mouth/Throat:      Mouth: Mucous membranes are moist.   Eyes:      Extraocular Movements: Extraocular movements intact.      Conjunctiva/sclera: Conjunctivae normal.   Cardiovascular:      Rate and Rhythm: Normal rate and regular rhythm.      Heart sounds: Normal heart sounds.      Comments: No JVD  Pulmonary:      Effort: Pulmonary effort is normal. No respiratory distress.      Breath sounds: Rales (faint) present. No wheezing.   Abdominal:      General: Bowel sounds are normal. There is no distension.      Palpations: Abdomen is soft.      Tenderness: There is no abdominal tenderness.   Musculoskeletal:         General: No tenderness. Normal range of motion.      Cervical back: Normal range of motion and neck supple.   Skin:     General: Skin is warm and dry.      Capillary Refill: Capillary refill takes less than 2 seconds.      Findings: No rash.   Neurological:      Mental Status: He is alert and oriented to person, place, and time.      Cranial Nerves: No cranial nerve deficit.      Sensory: No sensory deficit.      Coordination: Coordination normal.   Psychiatric:         Behavior: Behavior normal.         Thought Content: Thought content normal.         Judgment: Judgment normal.                Significant Labs: All pertinent labs within  the past 24 hours have been reviewed.  A1C:   Recent Labs   Lab 11/23/23  0819 04/09/24  1157   HGBA1C 5.0 5.0     CBC:   Recent Labs   Lab 04/09/24  1157   WBC 6.22   HGB 8.2*   HCT 27.2*        CMP:   Recent Labs   Lab 04/09/24  1157      K 3.6      CO2 20*   GLU 92   BUN 29*   CREATININE 5.8*   CALCIUM 8.4*   PROT 6.8   ALBUMIN 3.0*   BILITOT 0.6   ALKPHOS 81   AST 16   ALT 17   ANIONGAP 14     Cardiac Markers:   Recent Labs   Lab 04/09/24  1157   BNP 3,075*     Troponin:   Recent Labs   Lab 04/09/24  1157 04/09/24  1419   TROPONINI 0.035* 0.417*       Significant Imaging: I have reviewed all pertinent imaging results/findings within the past 24 hours.

## 2024-04-09 NOTE — ASSESSMENT & PLAN NOTE
Patient with Paroxysmal (<7 days) atrial fibrillation which is controlled currently with Beta Blocker and Amiodarone. Patient is currently in sinus rhythm.[id number] Score: 2. HASBLED Score: 4. Anticoagulation indicated.  -continue home Eliquis

## 2024-04-09 NOTE — ASSESSMENT & PLAN NOTE
Long term use of anticoagulants   Patient with Paroxysmal (<7 days) atrial fibrillation which was uncontrolled in [facility] with EMS and is now controlled currently with Beta Blocker and Amiodarone. Patient is currently in sinus rhythm.[id number] Score: 2. Anticoagulation indicated. Anticoagulation done with eliquis .  - See chest pain

## 2024-04-09 NOTE — ASSESSMENT & PLAN NOTE
Hypertensive heart and kidney disease   Chronic, controlled. Latest blood pressure and vitals reviewed-     Temp:  [98.2 °F (36.8 °C)]   Pulse:  []   Resp:  [17-22]   BP: (107-141)/(56-65)   SpO2:  [96 %-100 %] .   Home meds for hypertension were reviewed and noted below.   Hypertension Medications               doxazosin (CARDURA) 4 MG tablet Take 1 tablet (4 mg total) by mouth every 12 (twelve) hours.    furosemide (LASIX) 20 MG tablet Take 4 tablets (80 mg total) by mouth 2 (two) times daily as needed. For worsening LE swelling or shortness of breath on non-dialysis days    hydrALAZINE (APRESOLINE) 50 MG tablet TAKE 1 TABLET (50 MG TOTAL) BY MOUTH 3 (THREE) TIMES DAILY.    metoprolol succinate (TOPROL-XL) 25 MG 24 hr tablet Take 0.5 tablets (12.5 mg total) by mouth once daily.    NIFEdipine (PROCARDIA-XL) 60 MG (OSM) 24 hr tablet Take 1 tablet (60 mg total) by mouth 2 (two) times a day.    nitroGLYCERIN (NITROSTAT) 0.4 MG SL tablet Place 1 tablet (0.4 mg total) under the tongue every 5 (five) minutes as needed for Chest pain.     While in the hospital, will manage blood pressure as follows; Continue home antihypertensive regimen    Will utilize p.r.n. blood pressure medication only if patient's blood pressure greater than 180/110 and he develops symptoms such as worsening chest pain or shortness of breath.

## 2024-04-09 NOTE — ASSESSMENT & PLAN NOTE
Chronic immunosuppression, prophylactic immunotherapy  - Compliant with home prednisone and tacro taper  - Discussed with KTM, no inpatient needs for consult  - Continue home meds

## 2024-04-10 LAB
ABO + RH BLD: NORMAL
ANION GAP SERPL CALC-SCNC: 8 MMOL/L (ref 8–16)
APTT PPP: 39.1 SEC (ref 21–32)
APTT PPP: 39.4 SEC (ref 21–32)
ASCENDING AORTA: 3.47 CM
AV INDEX (PROSTH): 0.49
AV MEAN GRADIENT: 15 MMHG
AV PEAK GRADIENT: 30 MMHG
AV REGURGITATION PRESSURE HALF TIME: 300 MS
AV VALVE AREA BY VELOCITY RATIO: 2.04 CM²
AV VALVE AREA: 2.03 CM²
AV VELOCITY RATIO: 0.49
BASOPHILS # BLD AUTO: 0.01 K/UL (ref 0–0.2)
BASOPHILS # BLD AUTO: 0.02 K/UL (ref 0–0.2)
BASOPHILS # BLD AUTO: 0.02 K/UL (ref 0–0.2)
BASOPHILS NFR BLD: 0.1 % (ref 0–1.9)
BASOPHILS NFR BLD: 0.2 % (ref 0–1.9)
BASOPHILS NFR BLD: 0.2 % (ref 0–1.9)
BLD GP AB SCN CELLS X3 SERPL QL: NORMAL
BSA FOR ECHO PROCEDURE: 2.08 M2
BUN SERPL-MCNC: 18 MG/DL (ref 8–23)
CALCIUM SERPL-MCNC: 8.1 MG/DL (ref 8.7–10.5)
CHLORIDE SERPL-SCNC: 105 MMOL/L (ref 95–110)
CO2 SERPL-SCNC: 25 MMOL/L (ref 23–29)
CREAT SERPL-MCNC: 3.7 MG/DL (ref 0.5–1.4)
CV ECHO LV RWT: 0.28 CM
DIFFERENTIAL METHOD BLD: ABNORMAL
DOP CALC AO PEAK VEL: 2.73 M/S
DOP CALC AO VTI: 59.39 CM
DOP CALC LVOT AREA: 4.2 CM2
DOP CALC LVOT DIAMETER: 2.3 CM
DOP CALC LVOT PEAK VEL: 1.34 M/S
DOP CALC LVOT STROKE VOLUME: 120.63 CM3
DOP CALCLVOT PEAK VEL VTI: 29.05 CM
E WAVE DECELERATION TIME: 161.27 MSEC
E/A RATIO: 1.77
E/E' RATIO: 11.06 M/S
ECHO LV POSTERIOR WALL: 0.8 CM (ref 0.6–1.1)
EOSINOPHIL # BLD AUTO: 0.1 K/UL (ref 0–0.5)
EOSINOPHIL NFR BLD: 0.7 % (ref 0–8)
EOSINOPHIL NFR BLD: 1.1 % (ref 0–8)
EOSINOPHIL NFR BLD: 1.1 % (ref 0–8)
ERYTHROCYTE [DISTWIDTH] IN BLOOD BY AUTOMATED COUNT: 17.2 % (ref 11.5–14.5)
ERYTHROCYTE [DISTWIDTH] IN BLOOD BY AUTOMATED COUNT: 17.2 % (ref 11.5–14.5)
ERYTHROCYTE [DISTWIDTH] IN BLOOD BY AUTOMATED COUNT: 17.7 % (ref 11.5–14.5)
EST. GFR  (NO RACE VARIABLE): 16.9 ML/MIN/1.73 M^2
FRACTIONAL SHORTENING: 30 % (ref 28–44)
GLUCOSE SERPL-MCNC: 76 MG/DL (ref 70–110)
HBV SURFACE AB SER-ACNC: 14.39 MIU/ML
HBV SURFACE AB SER-ACNC: REACTIVE M[IU]/ML
HBV SURFACE AG SERPL QL IA: NORMAL
HCT VFR BLD AUTO: 22.2 % (ref 40–54)
HCT VFR BLD AUTO: 24.6 % (ref 40–54)
HCT VFR BLD AUTO: 24.6 % (ref 40–54)
HGB BLD-MCNC: 6.8 G/DL (ref 14–18)
HGB BLD-MCNC: 7.7 G/DL (ref 14–18)
HGB BLD-MCNC: 7.7 G/DL (ref 14–18)
IMM GRANULOCYTES # BLD AUTO: 0.05 K/UL (ref 0–0.04)
IMM GRANULOCYTES # BLD AUTO: 0.05 K/UL (ref 0–0.04)
IMM GRANULOCYTES # BLD AUTO: 0.06 K/UL (ref 0–0.04)
IMM GRANULOCYTES NFR BLD AUTO: 0.5 % (ref 0–0.5)
INTERVENTRICULAR SEPTUM: 0.88 CM (ref 0.6–1.1)
LA MAJOR: 5.4 CM
LA MINOR: 5.64 CM
LA WIDTH: 4.67 CM
LEFT ATRIUM SIZE: 3.89 CM
LEFT ATRIUM VOLUME INDEX MOD: 42 ML/M2
LEFT ATRIUM VOLUME INDEX: 40.8 ML/M2
LEFT ATRIUM VOLUME MOD: 87.87 CM3
LEFT ATRIUM VOLUME: 85.2 CM3
LEFT INTERNAL DIMENSION IN SYSTOLE: 4.03 CM (ref 2.1–4)
LEFT VENTRICLE DIASTOLIC VOLUME INDEX: 77.51 ML/M2
LEFT VENTRICLE DIASTOLIC VOLUME: 162 ML
LEFT VENTRICLE MASS INDEX: 87 G/M2
LEFT VENTRICLE SYSTOLIC VOLUME INDEX: 34 ML/M2
LEFT VENTRICLE SYSTOLIC VOLUME: 71.09 ML
LEFT VENTRICULAR INTERNAL DIMENSION IN DIASTOLE: 5.73 CM (ref 3.5–6)
LEFT VENTRICULAR MASS: 182.59 G
LV LATERAL E/E' RATIO: 8.55 M/S
LV SEPTAL E/E' RATIO: 15.67 M/S
LYMPHOCYTES # BLD AUTO: 0.7 K/UL (ref 1–4.8)
LYMPHOCYTES # BLD AUTO: 0.9 K/UL (ref 1–4.8)
LYMPHOCYTES # BLD AUTO: 0.9 K/UL (ref 1–4.8)
LYMPHOCYTES NFR BLD: 5.8 % (ref 18–48)
LYMPHOCYTES NFR BLD: 9.8 % (ref 18–48)
LYMPHOCYTES NFR BLD: 9.8 % (ref 18–48)
MAGNESIUM SERPL-MCNC: 1.9 MG/DL (ref 1.6–2.6)
MCH RBC QN AUTO: 25.4 PG (ref 27–31)
MCH RBC QN AUTO: 25.5 PG (ref 27–31)
MCH RBC QN AUTO: 25.5 PG (ref 27–31)
MCHC RBC AUTO-ENTMCNC: 30.6 G/DL (ref 32–36)
MCHC RBC AUTO-ENTMCNC: 31.3 G/DL (ref 32–36)
MCHC RBC AUTO-ENTMCNC: 31.3 G/DL (ref 32–36)
MCV RBC AUTO: 82 FL (ref 82–98)
MCV RBC AUTO: 82 FL (ref 82–98)
MCV RBC AUTO: 83 FL (ref 82–98)
MONOCYTES # BLD AUTO: 0.7 K/UL (ref 0.3–1)
MONOCYTES # BLD AUTO: 0.7 K/UL (ref 0.3–1)
MONOCYTES # BLD AUTO: 0.8 K/UL (ref 0.3–1)
MONOCYTES NFR BLD: 6.8 % (ref 4–15)
MONOCYTES NFR BLD: 7.6 % (ref 4–15)
MONOCYTES NFR BLD: 7.6 % (ref 4–15)
MV PEAK A VEL: 0.53 M/S
MV PEAK E VEL: 0.94 M/S
MV STENOSIS PRESSURE HALF TIME: 46.77 MS
MV VALVE AREA P 1/2 METHOD: 4.7 CM2
NEUTROPHILS # BLD AUTO: 7.6 K/UL (ref 1.8–7.7)
NEUTROPHILS # BLD AUTO: 7.6 K/UL (ref 1.8–7.7)
NEUTROPHILS # BLD AUTO: 9.9 K/UL (ref 1.8–7.7)
NEUTROPHILS NFR BLD: 80.8 % (ref 38–73)
NEUTROPHILS NFR BLD: 80.8 % (ref 38–73)
NEUTROPHILS NFR BLD: 86.1 % (ref 38–73)
NRBC BLD-RTO: 0 /100 WBC
OHS QRS DURATION: 104 MS
OHS QRS DURATION: 106 MS
OHS QRS DURATION: 94 MS
OHS QRS DURATION: 98 MS
OHS QTC CALCULATION: 431 MS
OHS QTC CALCULATION: 462 MS
OHS QTC CALCULATION: 466 MS
OHS QTC CALCULATION: 507 MS
PHOSPHATE SERPL-MCNC: 1.9 MG/DL (ref 2.7–4.5)
PISA TR MAX VEL: 3.04 M/S
PLATELET # BLD AUTO: 232 K/UL (ref 150–450)
PLATELET # BLD AUTO: 232 K/UL (ref 150–450)
PLATELET # BLD AUTO: 243 K/UL (ref 150–450)
PMV BLD AUTO: 11.8 FL (ref 9.2–12.9)
PMV BLD AUTO: 11.9 FL (ref 9.2–12.9)
PMV BLD AUTO: 11.9 FL (ref 9.2–12.9)
POTASSIUM SERPL-SCNC: 4.1 MMOL/L (ref 3.5–5.1)
RA MAJOR: 5.62 CM
RA PRESSURE ESTIMATED: 3 MMHG
RA WIDTH: 4.9 CM
RBC # BLD AUTO: 2.68 M/UL (ref 4.6–6.2)
RBC # BLD AUTO: 3.02 M/UL (ref 4.6–6.2)
RBC # BLD AUTO: 3.02 M/UL (ref 4.6–6.2)
RIGHT VENTRICULAR END-DIASTOLIC DIMENSION: 3.35 CM
RV TB RVSP: 6 MMHG
SINUS: 3.88 CM
SODIUM SERPL-SCNC: 138 MMOL/L (ref 136–145)
SPECIMEN OUTDATE: NORMAL
STJ: 2.8 CM
TACROLIMUS BLD-MCNC: 2.2 NG/ML (ref 5–15)
TACROLIMUS BLD-MCNC: 3.5 NG/ML (ref 5–15)
TDI LATERAL: 0.11 M/S
TDI SEPTAL: 0.06 M/S
TDI: 0.09 M/S
TR MAX PG: 37 MMHG
TRICUSPID ANNULAR PLANE SYSTOLIC EXCURSION: 2.3 CM
TROPONIN I SERPL DL<=0.01 NG/ML-MCNC: 3.51 NG/ML (ref 0–0.03)
TROPONIN I SERPL DL<=0.01 NG/ML-MCNC: 4.12 NG/ML (ref 0–0.03)
TROPONIN I SERPL DL<=0.01 NG/ML-MCNC: 4.49 NG/ML (ref 0–0.03)
TROPONIN I SERPL DL<=0.01 NG/ML-MCNC: 4.49 NG/ML (ref 0–0.03)
TROPONIN I SERPL DL<=0.01 NG/ML-MCNC: 4.55 NG/ML (ref 0–0.03)
TV REST PULMONARY ARTERY PRESSURE: 40 MMHG
WBC # BLD AUTO: 11.54 K/UL (ref 3.9–12.7)
WBC # BLD AUTO: 9.38 K/UL (ref 3.9–12.7)
WBC # BLD AUTO: 9.38 K/UL (ref 3.9–12.7)
Z-SCORE OF LEFT VENTRICULAR DIMENSION IN END DIASTOLE: -1.18
Z-SCORE OF LEFT VENTRICULAR DIMENSION IN END SYSTOLE: 0.18

## 2024-04-10 PROCEDURE — 25000003 PHARM REV CODE 250

## 2024-04-10 PROCEDURE — 63600175 PHARM REV CODE 636 W HCPCS

## 2024-04-10 PROCEDURE — 25000242 PHARM REV CODE 250 ALT 637 W/ HCPCS

## 2024-04-10 PROCEDURE — 20600001 HC STEP DOWN PRIVATE ROOM

## 2024-04-10 PROCEDURE — 80048 BASIC METABOLIC PNL TOTAL CA: CPT

## 2024-04-10 PROCEDURE — 63600175 PHARM REV CODE 636 W HCPCS: Performed by: INTERNAL MEDICINE

## 2024-04-10 PROCEDURE — 80100014 HC HEMODIALYSIS 1:1

## 2024-04-10 PROCEDURE — 87340 HEPATITIS B SURFACE AG IA: CPT | Performed by: STUDENT IN AN ORGANIZED HEALTH CARE EDUCATION/TRAINING PROGRAM

## 2024-04-10 PROCEDURE — 84484 ASSAY OF TROPONIN QUANT: CPT | Mod: 91

## 2024-04-10 PROCEDURE — 99232 SBSQ HOSP IP/OBS MODERATE 35: CPT | Mod: 25,,, | Performed by: INTERNAL MEDICINE

## 2024-04-10 PROCEDURE — 86706 HEP B SURFACE ANTIBODY: CPT | Performed by: STUDENT IN AN ORGANIZED HEALTH CARE EDUCATION/TRAINING PROGRAM

## 2024-04-10 PROCEDURE — 96366 THER/PROPH/DIAG IV INF ADDON: CPT

## 2024-04-10 PROCEDURE — 85025 COMPLETE CBC W/AUTO DIFF WBC: CPT | Performed by: PHYSICIAN ASSISTANT

## 2024-04-10 PROCEDURE — 86920 COMPATIBILITY TEST SPIN: CPT | Performed by: STUDENT IN AN ORGANIZED HEALTH CARE EDUCATION/TRAINING PROGRAM

## 2024-04-10 PROCEDURE — 27000221 HC OXYGEN, UP TO 24 HOURS

## 2024-04-10 PROCEDURE — 30233N1 TRANSFUSION OF NONAUTOLOGOUS RED BLOOD CELLS INTO PERIPHERAL VEIN, PERCUTANEOUS APPROACH: ICD-10-PCS | Performed by: INTERNAL MEDICINE

## 2024-04-10 PROCEDURE — 85730 THROMBOPLASTIN TIME PARTIAL: CPT | Performed by: INTERNAL MEDICINE

## 2024-04-10 PROCEDURE — 84484 ASSAY OF TROPONIN QUANT: CPT | Mod: 91 | Performed by: INTERNAL MEDICINE

## 2024-04-10 PROCEDURE — 83735 ASSAY OF MAGNESIUM: CPT

## 2024-04-10 PROCEDURE — 93005 ELECTROCARDIOGRAM TRACING: CPT

## 2024-04-10 PROCEDURE — 96365 THER/PROPH/DIAG IV INF INIT: CPT

## 2024-04-10 PROCEDURE — 84100 ASSAY OF PHOSPHORUS: CPT

## 2024-04-10 PROCEDURE — G0257 UNSCHED DIALYSIS ESRD PT HOS: HCPCS

## 2024-04-10 PROCEDURE — 99233 SBSQ HOSP IP/OBS HIGH 50: CPT | Mod: ,,, | Performed by: INTERNAL MEDICINE

## 2024-04-10 PROCEDURE — 93010 ELECTROCARDIOGRAM REPORT: CPT | Mod: ,,, | Performed by: INTERNAL MEDICINE

## 2024-04-10 PROCEDURE — 80197 ASSAY OF TACROLIMUS: CPT | Performed by: INTERNAL MEDICINE

## 2024-04-10 PROCEDURE — P9016 RBC LEUKOCYTES REDUCED: HCPCS | Performed by: STUDENT IN AN ORGANIZED HEALTH CARE EDUCATION/TRAINING PROGRAM

## 2024-04-10 PROCEDURE — 94761 N-INVAS EAR/PLS OXIMETRY MLT: CPT

## 2024-04-10 PROCEDURE — 25000003 PHARM REV CODE 250: Performed by: STUDENT IN AN ORGANIZED HEALTH CARE EDUCATION/TRAINING PROGRAM

## 2024-04-10 PROCEDURE — 25000003 PHARM REV CODE 250: Performed by: INTERNAL MEDICINE

## 2024-04-10 PROCEDURE — 86901 BLOOD TYPING SEROLOGIC RH(D): CPT | Performed by: NURSE PRACTITIONER

## 2024-04-10 PROCEDURE — 85025 COMPLETE CBC W/AUTO DIFF WBC: CPT | Mod: 91

## 2024-04-10 RX ORDER — NAPROXEN SODIUM 220 MG/1
81 TABLET, FILM COATED ORAL DAILY
Status: DISCONTINUED | OUTPATIENT
Start: 2024-04-10 | End: 2024-04-12 | Stop reason: HOSPADM

## 2024-04-10 RX ORDER — ISOSORBIDE MONONITRATE 30 MG/1
30 TABLET, EXTENDED RELEASE ORAL DAILY
Status: DISCONTINUED | OUTPATIENT
Start: 2024-04-10 | End: 2024-04-11

## 2024-04-10 RX ORDER — HYDRALAZINE HYDROCHLORIDE 50 MG/1
50 TABLET, FILM COATED ORAL 3 TIMES DAILY
Status: DISCONTINUED | OUTPATIENT
Start: 2024-04-10 | End: 2024-04-11

## 2024-04-10 RX ORDER — MAGNESIUM SULFATE HEPTAHYDRATE 40 MG/ML
2 INJECTION, SOLUTION INTRAVENOUS ONCE
Status: COMPLETED | OUTPATIENT
Start: 2024-04-10 | End: 2024-04-10

## 2024-04-10 RX ORDER — HYDROCODONE BITARTRATE AND ACETAMINOPHEN 500; 5 MG/1; MG/1
TABLET ORAL
Status: DISCONTINUED | OUTPATIENT
Start: 2024-04-10 | End: 2024-04-12

## 2024-04-10 RX ORDER — FUROSEMIDE 10 MG/ML
80 INJECTION INTRAMUSCULAR; INTRAVENOUS ONCE
Status: COMPLETED | OUTPATIENT
Start: 2024-04-10 | End: 2024-04-10

## 2024-04-10 RX ADMIN — ASPIRIN 81 MG CHEWABLE TABLET 81 MG: 81 TABLET CHEWABLE at 12:04

## 2024-04-10 RX ADMIN — HYDRALAZINE HYDROCHLORIDE 50 MG: 50 TABLET, FILM COATED ORAL at 05:04

## 2024-04-10 RX ADMIN — ATORVASTATIN CALCIUM 80 MG: 40 TABLET, FILM COATED ORAL at 08:04

## 2024-04-10 RX ADMIN — MUPIROCIN: 20 OINTMENT TOPICAL at 08:04

## 2024-04-10 RX ADMIN — DOXAZOSIN 4 MG: 2 TABLET ORAL at 08:04

## 2024-04-10 RX ADMIN — HEPARIN SODIUM 12 UNITS/KG/HR: 10000 INJECTION, SOLUTION INTRAVENOUS at 07:04

## 2024-04-10 RX ADMIN — NIFEDIPINE 60 MG: 60 TABLET, FILM COATED, EXTENDED RELEASE ORAL at 08:04

## 2024-04-10 RX ADMIN — TIOTROPIUM BROMIDE INHALATION SPRAY 2 PUFF: 1.56 SPRAY, METERED RESPIRATORY (INHALATION) at 08:04

## 2024-04-10 RX ADMIN — MAGNESIUM SULFATE HEPTAHYDRATE 2 G: 40 INJECTION, SOLUTION INTRAVENOUS at 12:04

## 2024-04-10 RX ADMIN — FLUTICASONE FUROATE AND VILANTEROL TRIFENATATE 1 PUFF: 100; 25 POWDER RESPIRATORY (INHALATION) at 08:04

## 2024-04-10 RX ADMIN — PANTOPRAZOLE SODIUM 40 MG: 40 TABLET, DELAYED RELEASE ORAL at 08:04

## 2024-04-10 RX ADMIN — HYDRALAZINE HYDROCHLORIDE 50 MG: 50 TABLET, FILM COATED ORAL at 03:04

## 2024-04-10 RX ADMIN — CHOLECALCIFEROL TAB 25 MCG (1000 UNIT) 1000 UNITS: 25 TAB at 08:04

## 2024-04-10 RX ADMIN — METOPROLOL SUCCINATE 12.5 MG: 25 TABLET, EXTENDED RELEASE ORAL at 05:04

## 2024-04-10 RX ADMIN — HEPARIN SODIUM 12 UNITS/KG/HR: 10000 INJECTION, SOLUTION INTRAVENOUS at 12:04

## 2024-04-10 RX ADMIN — AMIODARONE HYDROCHLORIDE 200 MG: 200 TABLET ORAL at 08:04

## 2024-04-10 RX ADMIN — TACROLIMUS 3 MG: 1 CAPSULE ORAL at 08:04

## 2024-04-10 RX ADMIN — THERA TABS 1 TABLET: TAB at 08:04

## 2024-04-10 RX ADMIN — TACROLIMUS 3 MG: 1 CAPSULE ORAL at 05:04

## 2024-04-10 RX ADMIN — PREDNISONE 5 MG: 5 TABLET ORAL at 08:04

## 2024-04-10 RX ADMIN — CLOPIDOGREL BISULFATE 75 MG: 75 TABLET ORAL at 08:04

## 2024-04-10 RX ADMIN — HYDRALAZINE HYDROCHLORIDE 50 MG: 50 TABLET, FILM COATED ORAL at 08:04

## 2024-04-10 RX ADMIN — FUROSEMIDE 80 MG: 10 INJECTION, SOLUTION INTRAVENOUS at 03:04

## 2024-04-10 RX ADMIN — ISOSORBIDE MONONITRATE 30 MG: 30 TABLET, EXTENDED RELEASE ORAL at 01:04

## 2024-04-10 RX ADMIN — PARICALCITOL 1 MCG: 1 CAPSULE, LIQUID FILLED ORAL at 08:04

## 2024-04-10 NOTE — SUBJECTIVE & OBJECTIVE
"Past Medical History:   Diagnosis Date    Atrial fibrillation     CAD (coronary artery disease) 2006    MI in 2006    CHF (congestive heart failure) 2017    CKD (chronic kidney disease) stage 3, GFR 30-59 ml/min     Dr. Latif.  in transplanted kidney    CKD (chronic kidney disease) stage 5, GFR less than 15 ml/min 02/02/2024    COVID-19 02/07/2023    Diverticulosis     Hyperlipidemia     Hypertension     Keloid cicatrix     Metabolic bone disease     Other emphysema 10/01/2019    Pericarditis     S/P kidney transplant 1992    x2 (1992 & 2005),    Thrombocytopenia     Thyroid disease     Tobacco use 09/05/2014       Past Surgical History:   Procedure Laterality Date    AV FISTULA PLACEMENT Left 12/18/2023    Procedure: CREATION, AV FISTULA;  Surgeon: JUANI Melendez III, MD;  [facility]  Service: Vascular;  Laterality: Left;  LUE AVF creation vs AVG insertion    CARDIOVERSION  04/30/15    CHOLECYSTECTOMY      COLONOSCOPY  April 20, 2011    Diverticulosis, repeat recommended in 3 yrs., repeat colonoscopy 2014 revealed 2 polyps.  He should return in 5 years.    COLONOSCOPY N/A 3/13/2020    Procedure: COLONOSCOPY;  Surgeon: Chon Casper MD;  Location: [facility] (4TH FLR);  Service: Endoscopy;  Laterality: N/A;  ok to hold coumadin x5days-see telephone encounter 2/4/20-tb    COLONOSCOPY N/A 2/4/2021    Procedure: COLONOSCOPY;  Surgeon: Chon Casper MD;  Location: [facility] (4TH FLR);  Service: Endoscopy;  Laterality: N/A;  Eliquis - per Dr. GAVIN Blunt ok to hold x 2 days and "restarted asap"- ERW  last prep "poor", [id number] ordered/ Pt requests Dr. Casper  prep ins. mailed - COVID screening on 2/1/21 PCW- ERW    COLONOSCOPY N/A 3/3/2021    Procedure: COLONOSCOPY;  Surgeon: Chon Casper MD;  Location: [facility] (4TH FLR);  Service: Endoscopy;  Laterality: N/A;  Patient with his second poor bowel pre and has poor prep today.  If patient not intersted or can't get colonoscopy tomorrow will " need constipation bowel prep and will need to restart his Eliquis today.Thanks,Chon     per Dr Lopez to hold Eliquis 2 days prior      COVID     CORONARY ANGIOGRAPHY N/A 2/28/2024    Procedure: ANGIOGRAM, CORONARY ARTERY;  Surgeon: Tylor Lincoln MD;  Location: [facility] CATH LAB;  Service: Cardiology;  Laterality: N/A;    CORONARY ANGIOPLASTY WITH STENT PLACEMENT  9/13/2006    FISTULOGRAM N/A 2/19/2024    Procedure: Fistulogram;  Surgeon: JUANI Melendez III, MD;  Location: [facility] CATH LAB;  Service: Peripheral Vascular;  Laterality: N/A;    IRRIGATION AND DEBRIDEMENT Right 8/30/2023    Procedure: IRRIGATION AND DEBRIDEMENT, RIGHT MIDDLE FINGER;  Surgeon: Martin Larsen MD;  [facility]  Service: Orthopedics;  Laterality: Right;    KIDNEY TRANSPLANT  2005    LEFT HEART CATHETERIZATION N/A 2/26/2024    Procedure: Left heart cath;  Surgeon: Tylor Lincoln MD;  Location: [facility] CATH LAB;  Service: Cardiology;  Laterality: N/A;    PARATHYROIDECTOMY      PERCUTANEOUS TRANSLUMINAL ANGIOPLASTY OF ARTERIOVENOUS FISTULA Left 2/19/2024    Procedure: PTA, AV FISTULA;  Surgeon: JUANI Melendez III, MD;  Location: [facility] CATH LAB;  Service: Peripheral Vascular;  Laterality: Left;    STENT, DRUG ELUTING, SINGLE VESSEL, CORONARY N/A 2/28/2024    Procedure: Stent, Drug Eluting, Single Vessel, Coronary;  Surgeon: Tylor Lincoln MD;  Location: [facility] CATH LAB;  Service: Cardiology;  Laterality: N/A;    TREATMENT OF CARDIAC ARRHYTHMIA N/A 3/28/2022    Procedure: CARDIOVERSION;  Surgeon: Migue Blunt MD;  Location: [facility] EP LAB;  Service: Cardiology;  Laterality: N/A;  AF, DCC, MAC, GP, 3 PREP*RODRIGO deferred pt 100% compliant*       Review of patient's allergies indicates:   Allergen Reactions    Ace inhibitors Swelling    Verapamil Other (See Comments)     Other reaction(s): Unknown    Povidone-iodine Itching       Family History       Problem Relation (Age of Onset)    Heart disease Father    Kidney  disease Sister, Brother    Kidney failure Sister, Brother    No Known Problems Sister, Brother, Sister, Sister    Thyroid disease Mother          Tobacco Use    Smoking status: Every Day     Current packs/day: 0.00     Average packs/day: 0.5 packs/day for 40.0 years (20.0 ttl pk-yrs)     Types: Cigarettes     Start date: 1982     Last attempt to quit: 2022     Years since quittin.1    Smokeless tobacco: Never    Tobacco comments:     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     Patient is currently retired   Substance and Sexual Activity    Alcohol use: No    Drug use: No    Sexual activity: Yes     Partners: Female      Review of Systems   Constitutional:  Positive for diaphoresis. Negative for activity change, appetite change, chills and fever.   HENT:  Negative for congestion and sore throat.    Respiratory:  Positive for shortness of breath. Negative for cough and wheezing.    Cardiovascular:  Positive for chest pain.   Neurological:  Negative for dizziness, light-headedness and headaches.     Objective:     Vital Signs (Most Recent):  Temp: 98.3 °F (36.8 °C) (24)  Pulse: 69 (24)  Resp: 20 (24)  BP: (!) 152/72 (24)  SpO2: 98 % (24) Vital Signs (24h Range):  Temp:  [97.1 °F (36.2 °C)-98.3 °F (36.8 °C)] 98.3 °F (36.8 °C)  Pulse:  [] 69  Resp:  [14-22] 20  SpO2:  [95 %-100 %] 98 %  BP: (107-163)/(56-80) 152/72   Weight: 84.4 kg (186 lb)  Body mass index is 24.54 kg/m².    No intake or output data in the 24 hours ending 24       Physical Exam  Vitals and nursing note reviewed.   Constitutional:       General: He is in acute distress.      Appearance: Normal appearance. He is normal weight. He is ill-appearing. He is not toxic-appearing.   HENT:      Head: Normocephalic and atraumatic.      Mouth/Throat:      Mouth: Mucous membranes are moist.   Eyes:      General: No scleral icterus.         Right eye: No discharge.         Left eye: No discharge.      Conjunctiva/sclera: Conjunctivae normal.   Cardiovascular:      Rate and Rhythm: Regular rhythm. Tachycardia present.      Heart sounds: No murmur heard.  Pulmonary:      Effort: Respiratory distress present.      Breath sounds: Rales present. No wheezing.   Abdominal:      General: Abdomen is flat. There is no distension.   Musculoskeletal:      Right lower leg: No edema.      Left lower leg: No edema.   Skin:     General: Skin is warm and dry.      Coloration: Skin is not pale.      Findings: No erythema.   Neurological:      Mental Status: He is alert and oriented to person, place, and time.   Psychiatric:         Mood and Affect: Mood normal.         Behavior: Behavior normal.            Vents:     Lines/Drains/Airways       Peripheral Intravenous Line  Duration                  Hemodialysis AV Fistula Left upper arm -- days         Hemodialysis AV Fistula 02/19/24 0924 Left upper arm 50 days         Peripheral IV - Single Lumen 04/09/24 1133 20 G Posterior;Right Forearm <1 day                  Significant Labs:    CBC/Anemia Profile:  Recent Labs   Lab 04/09/24  1157 04/09/24  2101 04/09/24  2214   WBC 6.22 7.00  --    HGB 8.2* 7.1*  --    HCT 27.2* 22.8* 27*    255  --    MCV 82 82  --    RDW 17.4* 17.7*  --         Chemistries:  Recent Labs   Lab 04/09/24  1157      K 3.6      CO2 20*   BUN 29*   CREATININE 5.8*   CALCIUM 8.4*   ALBUMIN 3.0*   PROT 6.8   BILITOT 0.6   ALKPHOS 81   ALT 17   AST 16       All pertinent labs within the past 24 hours have been reviewed.    Significant Imaging: I have reviewed all pertinent imaging results/findings within the past 24 hours.

## 2024-04-10 NOTE — PROGRESS NOTES
Notified by primary team patient with increase in SOB. Patient on 6L nasal cannula at time of notification. CXR consistent with volume overload (pulm edema). BNP>3K.     Patient esrd last Hd on Sat. Missed todays session. Currently admitted for chest pain on ACS protocol. Troponins increasing 0.4>1.9>3.4. Cardiology at bedside concern possible type 2 MI in setting of fluid overload and episodic atrial flutter. Patient to  be transferred to MICU for BIPAP.     Will plan for hemodialysis for metabolic clearance and volume removal. Discussed case with Hd nurse, patient to be prioritized give concerns for volume overload/resp status.     HD orders placed, HD nurse aware.     Notify nephrology for any acute changes with the patient.

## 2024-04-10 NOTE — CONSULTS
[facility]  Cardiology  Consult Note    Patient Name: Ibrahima Phelps  MRN: [id number]  Admission Date: 4/9/2024  Hospital Length of Stay: 0 days  Code Status: Full Code   Attending Provider: Nigel Turner MD   Consulting Provider: Cesario Tran MD  Primary Care Physician: Anatoliy Colindres MD  Principal Problem:Chest pain    Patient information was obtained from patient, relative(s), past medical records, and ER records.     Inpatient consult to Cardiology  Consult performed by: Cesario Tran MD  Consult ordered by: Trish Robles PA-C  Reason for consult: Chest pain + troponin elevation  Assessment/Recommendations: Please see A/P        Subjective:     Chief Complaint:  chest pain     HPI:   Patient is a 69 yo male with a PMHx of paroxysmal atrial fibrillation on amiodarone 200mg qd and apixaban 5mg bid, tachy-lona syndrome, HFpEF with most recent LVEF 65%, CAD s/p PCI 2006 on plavix, HTN, HLD, aortic atherosclerosis, secondary hyperparathyroidism, diverticulosis, COPD, GERD, hypothyroidism, gout, a history of tobacco use, obesity, ESRD s/p failed kidney transplant x2 (1992, 2005) on chronic immunosuppressive medication now on hemodialysis who was admitted to [facility] on 04/09/2024 with excrutiating chest pain. Daughter and son in law in room state that mother was present was EMS was present and afib was reported. Unclear if patient was in RVR or afib at all since no rhythm strips were sent with the patient or uploaded to the media tab. Patient reports that he had 2.5 hrs (830 am to 11 am) of chest discomfort, he was diaphoretic, and it was substernal. Does not recall any radiation to jaw or left arm. It woke him up from sleep and reports he felt like effort/exercise worsened the discomfort    ECG with NSR with inferolateral ST depressions and non-specific changes. Patient underwent coronary angiography 02/2024 and was found to have multivessel CAD. CTS had declined patient for CABG and patient  "under PCI to the RCA on 02/28/2024. Mid LAD 70% + OM1/OM2 70% obstruction remained medically managed as thought was that culprit vessel (RCA) was addressed.   Trop on admission 0.035 --> 0.4 --> 1.9.      Past Medical History:   Diagnosis Date    Atrial fibrillation     CAD (coronary artery disease) 2006    MI in 2006    CHF (congestive heart failure) 2017    CKD (chronic kidney disease) stage 3, GFR 30-59 ml/min     Dr. Latif.  in transplanted kidney    CKD (chronic kidney disease) stage 5, GFR less than 15 ml/min 02/02/2024    COVID-19 02/07/2023    Diverticulosis     Hyperlipidemia     Hypertension     Keloid cicatrix     Metabolic bone disease     Other emphysema 10/01/2019    Pericarditis     S/P kidney transplant 1992    x2 (1992 & 2005),    Thrombocytopenia     Thyroid disease     Tobacco use 09/05/2014       Past Surgical History:   Procedure Laterality Date    AV FISTULA PLACEMENT Left 12/18/2023    Procedure: CREATION, AV FISTULA;  Surgeon: JUANI Melendez III, MD;  [facility];  Service: Vascular;  Laterality: Left;  LUE AVF creation vs AVG insertion    CARDIOVERSION  04/30/15    CHOLECYSTECTOMY      COLONOSCOPY  April 20, 2011    Diverticulosis, repeat recommended in 3 yrs., repeat colonoscopy 2014 revealed 2 polyps.  He should return in 5 years.    COLONOSCOPY N/A 3/13/2020    Procedure: COLONOSCOPY;  Surgeon: Chon Casper MD;  Location: [facility] (4TH FLR);  Service: Endoscopy;  Laterality: N/A;  ok to hold coumadin x5days-see telephone encounter 2/4/20-tb    COLONOSCOPY N/A 2/4/2021    Procedure: COLONOSCOPY;  Surgeon: Chon Casper MD;  Location: [facility] (4TH FLR);  Service: Endoscopy;  Laterality: N/A;  Eliquis - per Dr. GAVIN Blunt ok to hold x 2 days and "restarted asap"- ERW  last prep "poor", [id number] ordered/ Pt requests Dr. Casper  prep ins. mailed - COVID screening on 2/1/21 PCW- ERW    COLONOSCOPY N/A 3/3/2021    Procedure: COLONOSCOPY;  Surgeon: Chon Casper, " MD;  Location: [facility] ENDO (4TH FLR);  Service: Endoscopy;  Laterality: N/A;  Patient with his second poor bowel pre and has poor prep today.  If patient not intersted or can't get colonoscopy tomorrow will need constipation bowel prep and will need to restart his Eliquis today.Thanks,Chon     per Dr Lopez to hold Eliquis 2 days prior      COVID     CORONARY ANGIOGRAPHY N/A 2/28/2024    Procedure: ANGIOGRAM, CORONARY ARTERY;  Surgeon: Tylor Lincoln MD;  Location: [facility] CATH LAB;  Service: Cardiology;  Laterality: N/A;    CORONARY ANGIOPLASTY WITH STENT PLACEMENT  9/13/2006    FISTULOGRAM N/A 2/19/2024    Procedure: Fistulogram;  Surgeon: JUANI Melednez III, MD;  Location: [facility] CATH LAB;  Service: Peripheral Vascular;  Laterality: N/A;    IRRIGATION AND DEBRIDEMENT Right 8/30/2023    Procedure: IRRIGATION AND DEBRIDEMENT, RIGHT MIDDLE FINGER;  Surgeon: Martin Larsen MD;  Location: [facility] OR 2ND FLR;  Service: Orthopedics;  Laterality: Right;    KIDNEY TRANSPLANT  2005    LEFT HEART CATHETERIZATION N/A 2/26/2024    Procedure: Left heart cath;  Surgeon: Tylor Lincoln MD;  Location: [facility] CATH LAB;  Service: Cardiology;  Laterality: N/A;    PARATHYROIDECTOMY      PERCUTANEOUS TRANSLUMINAL ANGIOPLASTY OF ARTERIOVENOUS FISTULA Left 2/19/2024    Procedure: PTA, AV FISTULA;  Surgeon: JUANI Melendez III, MD;  Location: [facility] CATH LAB;  Service: Peripheral Vascular;  Laterality: Left;    STENT, DRUG ELUTING, SINGLE VESSEL, CORONARY N/A 2/28/2024    Procedure: Stent, Drug Eluting, Single Vessel, Coronary;  Surgeon: Tylor Lincoln MD;  Location: [facility] CATH LAB;  Service: Cardiology;  Laterality: N/A;    TREATMENT OF CARDIAC ARRHYTHMIA N/A 3/28/2022    Procedure: CARDIOVERSION;  Surgeon: Migue Blunt MD;  Location: [facility] EP LAB;  Service: Cardiology;  Laterality: N/A;  AF, DCC, MAC, GP, 3 PREP*RODRIGO deferred pt 100% compliant*       Review of patient's allergies indicates:   Allergen Reactions    Ace  inhibitors Swelling    Verapamil Other (See Comments)     Other reaction(s): Unknown    Povidone-iodine Itching       No current facility-administered medications on file prior to encounter.     Current Outpatient Medications on File Prior to Encounter   Medication Sig    acetaminophen (TYLENOL) 500 MG tablet Take 1 tablet (500 mg total) by mouth every 6 (six) hours as needed for Pain.    albuterol (VENTOLIN HFA) 90 mcg/actuation inhaler Inhale 2 puffs into the lungs every 6 (six) hours as needed for Wheezing or Shortness of Breath. Rescue    albuterol-ipratropium (DUO-NEB) 2.5 mg-0.5 mg/3 mL nebulizer solution Take 3 mLs by nebulization every 6 (six) hours as needed for Wheezing. Rescue. Can be used in place of albuterol inhaler.    amiodarone (PACERONE) 200 MG Tab Take 1 tablet (200 mg total) by mouth once daily.    apixaban (ELIQUIS) 5 mg Tab Take 1 tablet (5 mg total) by mouth 2 (two) times daily.    aspirin (ECOTRIN) 81 MG EC tablet Take 1 tablet (81 mg total) by mouth once daily.    atorvastatin (LIPITOR) 80 MG tablet Take 1 tablet (80 mg total) by mouth once daily.    b complex vitamins (B COMPLEX-VITAMIN B12) tablet Take 1 tablet by mouth once daily.    calcium carbonate (CALCIUM 600 ORAL) Take 1 tablet by mouth 2 (two) times a day.    CHOLECALCIFEROL, VITAMIN D3, ORAL Take 2 tablets by mouth 2 (two) times daily.    clopidogreL (PLAVIX) 75 mg tablet Take 1 tablet (75 mg total) by mouth once daily.    doxazosin (CARDURA) 4 MG tablet Take 1 tablet (4 mg total) by mouth every 12 (twelve) hours.    famotidine (PEPCID) 20 MG tablet Take 1 tablet (20 mg total) by mouth 2 (two) times daily.    fexofenadine HCl (ALLEGRA ORAL) Take 1 tablet by mouth daily as needed (allergies).    fluticasone furoate-vilanteroL (BREO) 100-25 mcg/dose diskus inhaler Inhale 1 puff into the lungs once daily. Controller. Use this inhaler every day.    fluticasone propionate (FLONASE) 50 mcg/actuation nasal spray 1 spray (50 mcg total) by  Each Nostril route daily as needed for Allergies.    FLUZONE HIGHDOSE QUAD 23-24  mcg/0.7 mL Syrg     furosemide (LASIX) 20 MG tablet Take 4 tablets (80 mg total) by mouth 2 (two) times daily as needed. For worsening LE swelling or shortness of breath on non-dialysis days    gabapentin (NEURONTIN) 100 MG capsule Take 1 capsule (100 mg total) by mouth as needed (pain).    hydrALAZINE (APRESOLINE) 50 MG tablet TAKE 1 TABLET (50 MG TOTAL) BY MOUTH 3 (THREE) TIMES DAILY.    HYDROcodone-acetaminophen (NORCO) 5-325 mg per tablet Take 1 tablet by mouth every 6 (six) hours as needed for Pain.    metoprolol succinate (TOPROL-XL) 25 MG 24 hr tablet Take 0.5 tablets (12.5 mg total) by mouth once daily.    MITIGARE 0.6 mg Cap TAKE 1 CAPSULE BY MOUTH TWICE A DAY AS NEEDED GOUT FLARE UP TO 3 DAYS    multivitamin (THERAGRAN) per tablet Take 1 tablet by mouth Daily.    NIFEdipine (PROCARDIA-XL) 60 MG (OSM) 24 hr tablet Take 1 tablet (60 mg total) by mouth 2 (two) times a day.    nitroGLYCERIN (NITROSTAT) 0.4 MG SL tablet Place 1 tablet (0.4 mg total) under the tongue every 5 (five) minutes as needed for Chest pain.    omeprazole (PRILOSEC) 20 MG capsule Take 1 capsule (20 mg total) by mouth daily as needed (heartburn).    paricalcitoL (ZEMPLAR) 1 MCG capsule TAKE 1 CAPSULE ON MONDAY, WEDNESDAY AND FRIDAY    [facility],12Y UP,,PF, 30 mcg/0.3 mL injection Inject into the muscle.    predniSONE (DELTASONE) 5 MG tablet Take 1 tablet (5 mg total) by mouth once daily.    pulse oximeter (PULSE OXIMETER) device by Apply Externally route 2 (two) times a day. Use twice daily at 8 AM and 3 PM and record the value in MyChart as directed.    sevelamer carbonate (RENVELA) 800 mg Tab Take 1 tablet (800 mg total) by mouth 3 (three) times daily with meals.    SPIKEVAX [id number],12Y UP,,PF, 50 mcg/0.5 mL injection     suvorexant (BELSOMRA) 5 mg Tab Take 5 mg by mouth nightly as needed (insomnia).    tacrolimus (PROGRAF) 1 MG Cap Take 3  capsules (3 mg total) by mouth every 12 (twelve) hours for 60 days, THEN 2 capsules (2 mg total) every 12 (twelve) hours for 60 days, THEN 1 capsule (1 mg total) every 12 (twelve) hours for 60 days, THEN 1 capsule (1 mg total) once daily.    tiotropium bromide (SPIRIVA RESPIMAT) 1.25 mcg/actuation inhaler Inhale 2 puffs into the lungs once daily. Controller. Use this inhaler every day.     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     Patient is currently retired   Substance and Sexual Activity    Alcohol use: No    Drug use: No    Sexual activity: Yes     Partners: Female     Review of Systems   Constitutional: Negative for chills and fever.   Cardiovascular:  Negative for chest pain, orthopnea and palpitations.   Respiratory:  Negative for cough and shortness of breath.    Gastrointestinal:  Negative for abdominal pain.   Neurological:  Negative for dizziness, headaches and light-headedness.   Psychiatric/Behavioral:  Negative for altered mental status.      Objective:     Vital Signs (Most Recent):  Temp: 98.3 °F (36.8 °C) (24)  Pulse: 73 (24)  Resp: 20 (24)  BP: (!) 155/78 (24)  SpO2: 100 % (24) Vital Signs (24h Range):  Temp:  [97.1 °F (36.2 °C)-98.3 °F (36.8 °C)] 98.3 °F (36.8 °C)  Pulse:  [] 73  Resp:  [14-22] 20  SpO2:  [95 %-100 %] 100 %  BP: (107-163)/(56-80) 155/78     Weight:  "84.4 kg (186 lb)  Body mass index is 24.54 kg/m².    SpO2: 100 %       No intake or output data in the 24 hours ending 04/09/24 1946    Lines/Drains/Airways       Peripheral Intravenous Line  Duration                  Hemodialysis AV Fistula Left upper arm -- days         Hemodialysis AV Fistula 02/19/24 0924 Left upper arm 50 days         Peripheral IV - Single Lumen 04/09/24 1133 20 G Posterior;Right Forearm <1 day                     Physical Exam  Vitals and nursing note reviewed.   Constitutional:       General: He is not in acute distress.     Appearance: Normal appearance. He is ill-appearing. He is not toxic-appearing.   HENT:      Head: Normocephalic and atraumatic.      Mouth/Throat:      Mouth: Mucous membranes are moist.   Cardiovascular:      Rate and Rhythm: Normal rate and regular rhythm.      Heart sounds: No murmur heard.     No friction rub. No gallop.   Pulmonary:      Effort: Pulmonary effort is normal. No respiratory distress.      Breath sounds: Normal breath sounds.   Abdominal:      Palpations: Abdomen is soft.   Musculoskeletal:      Right lower leg: No edema.      Left lower leg: No edema.      Comments: Left arm fistula, good thrill   Skin:     General: Skin is warm.   Neurological:      Mental Status: He is alert and oriented to person, place, and time. Mental status is at baseline.          Significant Labs: BMP:   Recent Labs   Lab 04/09/24  1157   GLU 92      K 3.6      CO2 20*   BUN 29*   CREATININE 5.8*   CALCIUM 8.4*   , CMP   Recent Labs   Lab 04/09/24  1157      K 3.6      CO2 20*   GLU 92   BUN 29*   CREATININE 5.8*   CALCIUM 8.4*   PROT 6.8   ALBUMIN 3.0*   BILITOT 0.6   ALKPHOS 81   AST 16   ALT 17   ANIONGAP 14   , CBC   Recent Labs   Lab 04/09/24  1157   WBC 6.22   HGB 8.2*   HCT 27.2*      , INR No results for input(s): "INR", "PROTIME" in the last 48 hours., Troponin   Recent Labs   Lab 04/09/24  1157 04/09/24  1419 04/09/24  1729   TROPONINI " 0.035* 0.417* 1.930*   , and All pertinent lab results from the last 24 hours have been reviewed.    Significant Imaging: Echocardiogram: Transthoracic echo (TTE) complete ([facility] Only):   Results for orders placed or performed during the hospital encounter of 02/23/24   Echo   Result Value Ref Range    BSA 2.13 m2    LVOT stroke volume 85.85 cm3    LVIDd 5.37 3.5 - 6.0 cm    LV Systolic Volume 66.14 mL    LV Systolic Volume Index 31.2 mL/m2    LVIDs 3.91 2.1 - 4.0 cm    LV Diastolic Volume 139.45 mL    LV Diastolic Volume Index 65.78 mL/m2    IVS 1.0 0.6 - 1.1 cm    LVOT diameter 2.1 cm    LVOT area 3.5 cm2    FS 27 (A) 28 - 44 %    Left Ventricle Relative Wall Thickness 0.37 cm    Posterior Wall 1.0 0.6 - 1.1 cm    LV mass 204.82 g    LV Mass Index 97 g/m2    MV Peak E Aris 1.25 m/s    TDI LATERAL 0.09 m/s    TDI SEPTAL 0.07 m/s    E/E' ratio 15.63 m/s    MV Peak A Aris 0.68 m/s    TR Max Aris 3.21 m/s    E/A ratio 1.84     E wave deceleration time 240.56 msec    LV SEPTAL E/E' RATIO 17.86 m/s    LA Volume Index 49.8 mL/m2    LV LATERAL E/E' RATIO 13.89 m/s    LA volume 105.57 cm3    LVOT peak aris 1.05 m/s    RVDD 4.21 cm    TAPSE 2.53 cm    LA size 4.65 cm    Left Atrium Minor Axis 6.18 cm    Left Atrium Major Axis 5.26 cm    LA volume (mod) 95.00 cm3    LA WIDTH 4.70 cm    LA Volume Index (Mod) 44.8 mL/m2    RA Major Axis 5.76 cm    RA Width 4.82 cm    AV mean gradient 14 mmHg    AV peak gradient 27 mmHg    Ao peak aris 2.62 m/s    Ao VTI 52.95 cm    LVOT peak VTI 24.80 cm    AV valve area 1.62 cm²    AV Velocity Ratio 0.40     AV index (prosthetic) 0.47     VERONICA by Velocity Ratio 1.39 cm²    MV stenosis pressure 1/2 time 69.76 ms    MV valve area p 1/2 method 3.15 cm2    Triscuspid Valve Regurgitation Peak Gradient 41 mmHg    PV PEAK VELOCITY 3.31 m/s    PV peak gradient 44 mmHg    Sinus 3.61 cm    STJ 2.64 cm    Ascending aorta 2.80 cm    Mean e' 0.08 m/s    ZLVIDS -0.35     ZLVIDD -2.21     TV resting pulmonary  artery pressure 49 mmHg    RV TB RVSP 11 mmHg    Est. RA pres 8 mmHg    RA area 22.0 cm2    Narrative      Left Ventricle: There is normal systolic function with a visually   estimated ejection fraction of 60 - 65%. Grade II diastolic dysfunction.   Elevated left ventricular filling pressure.    Right Ventricle: Normal right ventricular cavity size. Wall thickness   is normal. Right ventricle wall motion  is normal. Systolic function is   normal.    Left Atrium: Left atrium is moderately dilated.    Right Atrium: Right atrium is moderately dilated.    Aortic Valve: The aortic valve is a trileaflet valve. There is moderate   aortic valve sclerosis. There is moderate annular calcification present.   There is mild to moderate aortic regurgitation. There appers to be mobile   echodensoty attached to the base of the non coronary leaflet that is   likely nodular calcificaiton and is tthe site of origin of Aortic   regurgitation. This appears unchanged from pror TTE.    Aortic Valve: Mildly restricted motion. There is mild stenosis. Aortic   valve area by VTI is 1.62 cm². Aortic valve peak velocity is 2.62 m/s.   Mean gradient is 14 mmHg. The dimensionless index is 0.47.    Mitral Valve: There is moderate mitral annular calcification present.   There is mild to moderate regurgitation.    Aorta: Aortic root is normal in size measuring 3.61 cm. Ascending aorta   is normal measuring 2.80 cm.    Pulmonary Artery: The estimated pulmonary artery systolic pressure is   49 mmHg.    IVC/SVC: Intermediate venous pressure at 8 mmHg.       Assessment and Plan:     * Chest pain  Patient is a 67 yo male with a PMHx of paroxysmal atrial fibrillation on amiodarone 200mg qd and apixaban 5mg bid, tachy-loan syndrome, HFpEF with most recent LVEF 65%, CAD s/p PCI 2006 on plavix, HTN, HLD, aortic atherosclerosis, secondary hyperparathyroidism, diverticulosis, COPD, GERD, hypothyroidism, gout, a history of tobacco use, obesity, ESRD s/p failed  kidney transplant x2 (1992, 2005) on chronic immunosuppressive medication now on hemodialysis who was admitted to [facility] on 04/09/2024 with excrutiating chest pain. ECG with NSR with inferolateral ST depressions and non-specific changes. Patient underwent coronary angiography 02/2024 and was found to have multivessel CAD. CTS had declined patient for CABG and patient under PCI to the RCA on 02/28/2024. Mid LAD 70% + OM1/OM2 70% obstruction remained medically managed as thought was that culprit vessel (RCA) was addressed.   Trop on admission 0.035 --> 0.4 --> 1.9. While hypervolemia is on the differnetial, there are no clinical signs of hypervolemia at this time, last HD session being Saturday and patient being due for HD today. Higher on differential is ACS at this time.   - Trend trop to peak then stop once peaked  - Formal TTE  - Hold apixaban for now while starting ACS protocol  - Start ACS protocol  - Continue high intensity statin  - Continue beta blocker  - Keep NPO after midnight for possible repeat coronary angiogram, reassess in AM  - NTG SL 0.4  q 5 min PRN for chest discomfort  - STAT ECG if patient has chest discomfort and call Cardiology   - Monitor telemetry    Prophylactic immunotherapy  Failed kidney transplant x 2  On tacro + pred per transplant nephrology    Paroxysmal atrial fibrillation  Continue heparin gtt for now  Restart home Eliquis after ACS complete and no further invasive procedures planned    Long term (current) use of anticoagulants [V58.61]  Change Eliquis (afib) to heparin for Acs for now    Mixed hyperlipidemia  Continue high intensity statin          VTE Risk Mitigation (From admission, onward)           Ordered     heparin 25,000 units in dextrose 5% (100 units/ml) IV bolus from bag LOW INTENSITY nomogram - [facility]  As needed (PRN)        Question:  Heparin Infusion Adjustment (DO NOT MODIFY ANSWER)  Answer:  [email or web address]\Images\Pharmacy\HeparinInfusions\heparin LOW INTENSITY  nomogram for [facility] [id number]    04/09/24 1853     heparin 25,000 units in dextrose 5% (100 units/ml) IV bolus from bag LOW INTENSITY nomogram - [facility]  As needed (PRN)        Question:  Heparin Infusion Adjustment (DO NOT MODIFY ANSWER)  Answer:  [email or web address]\Images\Pharmacy\HeparinInfusions\heparin LOW INTENSITY nomogram for [facility] [id number]    04/09/24 1853     heparin 25,000 units in dextrose 5% (100 units/ml) IV bolus from bag LOW INTENSITY nomogram - [facility]  Once        Question:  Heparin Infusion Adjustment (DO NOT MODIFY ANSWER)  Answer:  [email or web address]\Images\Pharmacy\HeparinInfusions\heparin LOW INTENSITY nomogram for [facility] [id number]    04/09/24 1853     heparin 25,000 units in dextrose 5% 250 mL (100 units/mL) infusion LOW INTENSITY nomogram - [facility]  Continuous        Question:  Begin at (units/kg/hr)  Answer:  12    04/09/24 1853     IP VTE HIGH RISK PATIENT  Once         04/09/24 1536     Place sequential compression device  Until discontinued         04/09/24 1536                    Thank you for your consult. I will follow-up with patient. Please contact us if you have any additional questions.    Cesario Tran MD  Cardiology   [facility] - Observation [facility]

## 2024-04-10 NOTE — SUBJECTIVE & OBJECTIVE
"Interval History: Flashed ON 2/2 hypertensive episode, required step up to ICU and NIPPV, diuresed with furosemide 80 overnight, improved and weaned to RA this morning. Satting well, conversing appropriately, no new cheat pain SOB improved. VSS      Past Medical History:   Diagnosis Date    Atrial fibrillation     CAD (coronary artery disease) 2006    MI in 2006    CHF (congestive heart failure) 2017    CKD (chronic kidney disease) stage 3, GFR 30-59 ml/min     Dr. Latif.  in transplanted kidney    CKD (chronic kidney disease) stage 5, GFR less than 15 ml/min 02/02/2024    COVID-19 02/07/2023    Diverticulosis     Hyperlipidemia     Hypertension     Keloid cicatrix     Metabolic bone disease     Other emphysema 10/01/2019    Pericarditis     S/P kidney transplant 1992    x2 (1992 & 2005),    Thrombocytopenia     Thyroid disease     Tobacco use 09/05/2014       Past Surgical History:   Procedure Laterality Date    AV FISTULA PLACEMENT Left 12/18/2023    Procedure: CREATION, AV FISTULA;  Surgeon: JUANI Melendez III, MD;  [facility];  Service: Vascular;  Laterality: Left;  LUE AVF creation vs AVG insertion    CARDIOVERSION  04/30/15    CHOLECYSTECTOMY      COLONOSCOPY  April 20, 2011    Diverticulosis, repeat recommended in 3 yrs., repeat colonoscopy 2014 revealed 2 polyps.  He should return in 5 years.    COLONOSCOPY N/A 3/13/2020    Procedure: COLONOSCOPY;  Surgeon: Chon Casper MD;  Location: [facility] (4TH FLR);  Service: Endoscopy;  Laterality: N/A;  ok to hold coumadin x5days-see telephone encounter 2/4/20-tb    COLONOSCOPY N/A 2/4/2021    Procedure: COLONOSCOPY;  Surgeon: Chon Casper MD;  [facility]  Service: Endoscopy;  Laterality: N/A;  Eliquis - per Dr. GAVIN Blunt ok to hold x 2 days and "restarted asap"- ERW  last prep "poor", [id number] ordered/ Pt requests Dr. Casper  prep ins. mailed - COVID screening on 2/1/21 PCW- ERW    COLONOSCOPY N/A 3/3/2021    " Procedure: COLONOSCOPY;  Surgeon: Chon Casper MD;  Location: [facility] (4TH FLR);  Service: Endoscopy;  Laterality: N/A;  Patient with his second poor bowel pre and has poor prep today.  If patient not intersted or can't get colonoscopy tomorrow will need constipation bowel prep and will need to restart his Eliquis today.Thanks,Chon Lopez to hold Eliquis 2 days prior      COVID     CORONARY ANGIOGRAPHY N/A 2/28/2024    Procedure: ANGIOGRAM, CORONARY ARTERY;  Surgeon: Tylor Lincoln MD;  Location: [facility] CATH LAB;  Service: Cardiology;  Laterality: N/A;    CORONARY ANGIOPLASTY WITH STENT PLACEMENT  9/13/2006    FISTULOGRAM N/A 2/19/2024    Procedure: Fistulogram;  Surgeon: JUANI Melendez III, MD;  Location: [facility] CATH LAB;  Service: Peripheral Vascular;  Laterality: N/A;    IRRIGATION AND DEBRIDEMENT Right 8/30/2023    Procedure: IRRIGATION AND DEBRIDEMENT, RIGHT MIDDLE FINGER;  Surgeon: Martin Larsen MD;  Location: [facility] OR 2ND FLR;  Service: Orthopedics;  Laterality: Right;    KIDNEY TRANSPLANT  2005    LEFT HEART CATHETERIZATION N/A 2/26/2024    Procedure: Left heart cath;  Surgeon: Tylor Lincoln MD;  Location: [facility] CATH LAB;  Service: Cardiology;  Laterality: N/A;    PARATHYROIDECTOMY      PERCUTANEOUS TRANSLUMINAL ANGIOPLASTY OF ARTERIOVENOUS FISTULA Left 2/19/2024    Procedure: PTA, AV FISTULA;  Surgeon: JUANI Melendez III, MD;  Location: [facility] CATH LAB;  Service: Peripheral Vascular;  Laterality: Left;    STENT, DRUG ELUTING, SINGLE VESSEL, CORONARY N/A 2/28/2024    Procedure: Stent, Drug Eluting, Single Vessel, Coronary;  Surgeon: Tylor Lincoln MD;  Location: [facility] CATH LAB;  Service: Cardiology;  Laterality: N/A;    TREATMENT OF CARDIAC ARRHYTHMIA N/A 3/28/2022    Procedure: CARDIOVERSION;  Surgeon: Migue Blunt MD;  Location: [facility] EP LAB;  Service: Cardiology;  Laterality: N/A;  AF, DCC, MAC, GP, 3 PREP*RODRIGO deferred pt 100% compliant*       Review of patient's  allergies indicates:   Allergen Reactions    Ace inhibitors Swelling    Verapamil Other (See Comments)     Other reaction(s): Unknown    Povidone-iodine Itching       No current facility-administered medications on file prior to encounter.     Current Outpatient Medications on File Prior to Encounter   Medication Sig    acetaminophen (TYLENOL) 500 MG tablet Take 1 tablet (500 mg total) by mouth every 6 (six) hours as needed for Pain.    albuterol (VENTOLIN HFA) 90 mcg/actuation inhaler Inhale 2 puffs into the lungs every 6 (six) hours as needed for Wheezing or Shortness of Breath. Rescue    albuterol-ipratropium (DUO-NEB) 2.5 mg-0.5 mg/3 mL nebulizer solution Take 3 mLs by nebulization every 6 (six) hours as needed for Wheezing. Rescue. Can be used in place of albuterol inhaler.    amiodarone (PACERONE) 200 MG Tab Take 1 tablet (200 mg total) by mouth once daily.    apixaban (ELIQUIS) 5 mg Tab Take 1 tablet (5 mg total) by mouth 2 (two) times daily.    aspirin (ECOTRIN) 81 MG EC tablet Take 1 tablet (81 mg total) by mouth once daily.    atorvastatin (LIPITOR) 80 MG tablet Take 1 tablet (80 mg total) by mouth once daily.    b complex vitamins (B COMPLEX-VITAMIN B12) tablet Take 1 tablet by mouth once daily.    calcium carbonate (CALCIUM 600 ORAL) Take 1 tablet by mouth 2 (two) times a day.    CHOLECALCIFEROL, VITAMIN D3, ORAL Take 2 tablets by mouth 2 (two) times daily.    clopidogreL (PLAVIX) 75 mg tablet Take 1 tablet (75 mg total) by mouth once daily.    doxazosin (CARDURA) 4 MG tablet Take 1 tablet (4 mg total) by mouth every 12 (twelve) hours.    famotidine (PEPCID) 20 MG tablet Take 1 tablet (20 mg total) by mouth 2 (two) times daily.    fexofenadine HCl (ALLEGRA ORAL) Take 1 tablet by mouth daily as needed (allergies).    fluticasone furoate-vilanteroL (BREO) 100-25 mcg/dose diskus inhaler Inhale 1 puff into the lungs once daily. Controller. Use this inhaler every day.    fluticasone propionate (FLONASE) 50  mcg/actuation nasal spray 1 spray (50 mcg total) by Each Nostril route daily as needed for Allergies.    FLUZONE HIGHDOSE QUAD 23-24  mcg/0.7 mL Syrg     furosemide (LASIX) 20 MG tablet Take 4 tablets (80 mg total) by mouth 2 (two) times daily as needed. For worsening LE swelling or shortness of breath on non-dialysis days    gabapentin (NEURONTIN) 100 MG capsule Take 1 capsule (100 mg total) by mouth as needed (pain).    hydrALAZINE (APRESOLINE) 50 MG tablet TAKE 1 TABLET (50 MG TOTAL) BY MOUTH 3 (THREE) TIMES DAILY.    HYDROcodone-acetaminophen (NORCO) 5-325 mg per tablet Take 1 tablet by mouth every 6 (six) hours as needed for Pain.    metoprolol succinate (TOPROL-XL) 25 MG 24 hr tablet Take 0.5 tablets (12.5 mg total) by mouth once daily.    MITIGARE 0.6 mg Cap TAKE 1 CAPSULE BY MOUTH TWICE A DAY AS NEEDED GOUT FLARE UP TO 3 DAYS    multivitamin (THERAGRAN) per tablet Take 1 tablet by mouth Daily.    NIFEdipine (PROCARDIA-XL) 60 MG (OSM) 24 hr tablet Take 1 tablet (60 mg total) by mouth 2 (two) times a day.    nitroGLYCERIN (NITROSTAT) 0.4 MG SL tablet Place 1 tablet (0.4 mg total) under the tongue every 5 (five) minutes as needed for Chest pain.    omeprazole (PRILOSEC) 20 MG capsule Take 1 capsule (20 mg total) by mouth daily as needed (heartburn).    paricalcitoL (ZEMPLAR) 1 MCG capsule TAKE 1 CAPSULE ON MONDAY, WEDNESDAY AND FRIDAY    PFIZER COVID BIVAL,12Y UP,,PF, 30 mcg/0.3 mL injection Inject into the muscle.    predniSONE (DELTASONE) 5 MG tablet Take 1 tablet (5 mg total) by mouth once daily.    pulse oximeter (PULSE OXIMETER) device by Apply Externally route 2 (two) times a day. Use twice daily at 8 AM and 3 PM and record the value in MyChart as directed.    sevelamer carbonate (RENVELA) 800 mg Tab Take 1 tablet (800 mg total) by mouth 3 (three) times daily with meals.    SPIKEVAX [id number],12Y UP,,PF, 50 mcg/0.5 mL injection     suvorexant (BELSOMRA) 5 mg Tab Take 5 mg by mouth nightly as needed  (insomnia).    tacrolimus (PROGRAF) 1 MG Cap Take 3 capsules (3 mg total) by mouth every 12 (twelve) hours for 60 days, THEN 2 capsules (2 mg total) every 12 (twelve) hours for 60 days, THEN 1 capsule (1 mg total) every 12 (twelve) hours for 60 days, THEN 1 capsule (1 mg total) once daily.    tiotropium bromide (SPIRIVA RESPIMAT) 1.25 mcg/actuation inhaler Inhale 2 puffs into the lungs once daily. Controller. Use this inhaler every day.     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     Patient is currently retired   Substance and Sexual Activity    Alcohol use: No    Drug use: No    Sexual activity: Yes     Partners: Female     Review of Systems   Constitutional: Negative for chills and fever.   Cardiovascular:  Negative for chest pain, orthopnea and palpitations.   Respiratory:  Negative for cough and shortness of breath.    Gastrointestinal:  Negative for abdominal pain.   Neurological:  Negative for dizziness, headaches and light-headedness.   Psychiatric/Behavioral:  Negative for altered mental status.      Objective:     Vital Signs (Most Recent):  Temp: 99.7 °F (37.6 °C) (04/10/24 0830)  Pulse: 70 (04/10/24 1000)  Resp: (!) 23 (04/10/24 1000)  BP: 92/64 (04/10/24 1000)  SpO2: 99 % (04/10/24 1000) Vital Signs (24h Range):  Temp:  [97.1 °F (36.2 °C)-99.7 °F (37.6 °C)] 99.7 °F (37.6 °C)  Pulse:  [63-98] 70  Resp:  [14-45] 23  SpO2:  [95 %-100 %] 99  %  BP: ()/(54-94) 92/64     Weight: 84.4 kg (186 lb)  Body mass index is 24.54 kg/m².    SpO2: 99 %         Intake/Output Summary (Last 24 hours) at 4/10/2024 1111  Last data filed at 4/10/2024 1000  Gross per 24 hour   Intake 512.84 ml   Output 3000 ml   Net -2487.16 ml       Lines/Drains/Airways       Peripheral Intravenous Line  Duration                  Hemodialysis AV Fistula Left upper arm -- days         Hemodialysis AV Fistula 02/19/24 0924 Left upper arm 51 days         Midline Catheter - Single Lumen 04/10/24 0030 Right basilic vein (medial side of arm) <1 day         Peripheral IV - Single Lumen 04/10/24 0230 20 G Right Antecubital <1 day                     Physical Exam  Vitals and nursing note reviewed.   Constitutional:       General: He is not in acute distress.     Appearance: Normal appearance. He is ill-appearing. He is not toxic-appearing.   HENT:      Head: Normocephalic and atraumatic.      Mouth/Throat:      Mouth: Mucous membranes are moist.   Cardiovascular:      Rate and Rhythm: Normal rate and regular rhythm.      Heart sounds: No murmur heard.     No friction rub. No gallop.   Pulmonary:      Effort: Pulmonary effort is normal. No respiratory distress.      Breath sounds: Normal breath sounds.   Abdominal:      Palpations: Abdomen is soft.   Musculoskeletal:      Right lower leg: No edema.      Left lower leg: No edema.      Comments: Left arm fistula, good thrill   Skin:     General: Skin is warm.   Neurological:      Mental Status: He is alert and oriented to person, place, and time. Mental status is at baseline.          Significant Labs: BMP:   Recent Labs   Lab 04/09/24  1157 04/09/24  2219 04/10/24  0605   GLU 92 119* 76    141 138   K 3.6 4.2 4.1    107 105   CO2 20* 20* 25   BUN 29* 31* 18   CREATININE 5.8* 5.9* 3.7*   CALCIUM 8.4* 8.3* 8.1*   MG  --  2.1 1.9     , CMP   Recent Labs   Lab 04/09/24  1157 04/09/24  2219 04/10/24  0605    141 138   K 3.6 4.2  4.1    107 105   CO2 20* 20* 25   GLU 92 119* 76   BUN 29* 31* 18   CREATININE 5.8* 5.9* 3.7*   CALCIUM 8.4* 8.3* 8.1*   PROT 6.8  --   --    ALBUMIN 3.0*  --   --    BILITOT 0.6  --   --    ALKPHOS 81  --   --    AST 16  --   --    ALT 17  --   --    ANIONGAP 14 14 8     , CBC   Recent Labs   Lab 04/09/24  2101 04/09/24  2214 04/10/24  0123 04/10/24  0605   WBC 7.00  --  11.54 9.38  9.38   HGB 7.1*  --  6.8* 7.7*  7.7*   HCT 22.8*   < > 22.2* 24.6*  24.6*     --  243 232  232    < > = values in this interval not displayed.     , INR   Recent Labs   Lab 04/09/24  2101   INR 1.3*   , Troponin   Recent Labs   Lab 04/10/24  0208 04/10/24  0605 04/10/24  1007   TROPONINI 4.492*  4.492* 4.125* 4.550*     , and All pertinent lab results from the last 24 hours have been reviewed.    Significant Imaging: Echocardiogram: Transthoracic echo (TTE) complete (Cupid Only):   Results for orders placed or performed during the hospital encounter of 02/23/24   Echo   Result Value Ref Range    BSA 2.13 m2    LVOT stroke volume 85.85 cm3    LVIDd 5.37 3.5 - 6.0 cm    LV Systolic Volume 66.14 mL    LV Systolic Volume Index 31.2 mL/m2    LVIDs 3.91 2.1 - 4.0 cm    LV Diastolic Volume 139.45 mL    LV Diastolic Volume Index 65.78 mL/m2    IVS 1.0 0.6 - 1.1 cm    LVOT diameter 2.1 cm    LVOT area 3.5 cm2    FS 27 (A) 28 - 44 %    Left Ventricle Relative Wall Thickness 0.37 cm    Posterior Wall 1.0 0.6 - 1.1 cm    LV mass 204.82 g    LV Mass Index 97 g/m2    MV Peak E Aris 1.25 m/s    TDI LATERAL 0.09 m/s    TDI SEPTAL 0.07 m/s    E/E' ratio 15.63 m/s    MV Peak A Aris 0.68 m/s    TR Max Aris 3.21 m/s    E/A ratio 1.84     E wave deceleration time 240.56 msec    LV SEPTAL E/E' RATIO 17.86 m/s    LA Volume Index 49.8 mL/m2    LV LATERAL E/E' RATIO 13.89 m/s    LA volume 105.57 cm3    LVOT peak aris 1.05 m/s    RVDD 4.21 cm    TAPSE 2.53 cm    LA size 4.65 cm    Left Atrium Minor Axis 6.18 cm    Left Atrium Major Axis 5.26 cm     LA volume (mod) 95.00 cm3    LA WIDTH 4.70 cm    LA Volume Index (Mod) 44.8 mL/m2    RA Major Axis 5.76 cm    RA Width 4.82 cm    AV mean gradient 14 mmHg    AV peak gradient 27 mmHg    Ao peak huan 2.62 m/s    Ao VTI 52.95 cm    LVOT peak VTI 24.80 cm    AV valve area 1.62 cm²    AV Velocity Ratio 0.40     AV index (prosthetic) 0.47     VERONICA by Velocity Ratio 1.39 cm²    MV stenosis pressure 1/2 time 69.76 ms    MV valve area p 1/2 method 3.15 cm2    Triscuspid Valve Regurgitation Peak Gradient 41 mmHg    PV PEAK VELOCITY 3.31 m/s    PV peak gradient 44 mmHg    Sinus 3.61 cm    STJ 2.64 cm    Ascending aorta 2.80 cm    Mean e' 0.08 m/s    ZLVIDS -0.35     ZLVIDD -2.21     TV resting pulmonary artery pressure 49 mmHg    RV TB RVSP 11 mmHg    Est. RA pres 8 mmHg    RA area 22.0 cm2    Narrative      Left Ventricle: There is normal systolic function with a visually   estimated ejection fraction of 60 - 65%. Grade II diastolic dysfunction.   Elevated left ventricular filling pressure.    Right Ventricle: Normal right ventricular cavity size. Wall thickness   is normal. Right ventricle wall motion  is normal. Systolic function is   normal.    Left Atrium: Left atrium is moderately dilated.    Right Atrium: Right atrium is moderately dilated.    Aortic Valve: The aortic valve is a trileaflet valve. There is moderate   aortic valve sclerosis. There is moderate annular calcification present.   There is mild to moderate aortic regurgitation. There appers to be mobile   echodensoty attached to the base of the non coronary leaflet that is   likely nodular calcificaiton and is tthe site of origin of Aortic   regurgitation. This appears unchanged from pror TTE.    Aortic Valve: Mildly restricted motion. There is mild stenosis. Aortic   valve area by VTI is 1.62 cm². Aortic valve peak velocity is 2.62 m/s.   Mean gradient is 14 mmHg. The dimensionless index is 0.47.    Mitral Valve: There is moderate mitral annular calcification  present.   There is mild to moderate regurgitation.    Aorta: Aortic root is normal in size measuring 3.61 cm. Ascending aorta   is normal measuring 2.80 cm.    Pulmonary Artery: The estimated pulmonary artery systolic pressure is   49 mmHg.    IVC/SVC: Intermediate venous pressure at 8 mmHg.

## 2024-04-10 NOTE — HPI
Patient is a 69 yo male with a PMHx of paroxysmal atrial fibrillation on amiodarone 200mg qd and apixaban 5mg bid, tachy-loan syndrome, HFpEF with most recent LVEF 65%, CAD s/p PCI 2006 on plavix, HTN, HLD, aortic atherosclerosis, secondary hyperparathyroidism, diverticulosis, COPD, GERD, hypothyroidism, gout, a history of tobacco use, obesity, ESRD s/p failed kidney transplant x2 (1992, 2005) on chronic immunosuppressive medication now on hemodialysis who was admitted to [facility] on 04/09/2024 with excrutiating chest pain. Daughter and son in law in room state that mother was present was EMS was present and afib was reported. Unclear if patient was in RVR or afib at all since no rhythm strips were sent with the patient or uploaded to the media tab. Patient reports that he had 2.5 hrs (830 am to 11 am) of chest discomfort, he was diaphoretic, and it was substernal. Does not recall any radiation to jaw or left arm. It woke him up from sleep and reports he felt like effort/exercise worsened the discomfort    ECG with NSR with inferolateral ST depressions and non-specific changes. Patient underwent coronary angiography 02/2024 and was found to have multivessel CAD. CTS had declined patient for CABG and patient under PCI to the RCA on 02/28/2024. Mid LAD 70% + OM1/OM2 70% obstruction remained medically managed as thought was that culprit vessel (RCA) was addressed.   Trop on admission 0.035 --> 0.4 --> 1.9.

## 2024-04-10 NOTE — ASSESSMENT & PLAN NOTE
Patient is identified as having Diastolic (HFpEF) heart failure that is Chronic.    Echo 2/25/24    Left Ventricle: There is normal systolic function with a visually estimated ejection fraction of 60 - 65%. Grade II diastolic dysfunction. Elevated left ventricular filling pressure.    Right Ventricle: Normal right ventricular cavity size. Wall thickness is normal. Right ventricle wall motion  is normal. Systolic function is normal.    Left Atrium: Left atrium is moderately dilated.    Right Atrium: Right atrium is moderately dilated.    Aortic Valve: The aortic valve is a trileaflet valve. There is moderate aortic valve sclerosis. There is moderate annular calcification present. There is mild to moderate aortic regurgitation. There appers to be mobile echodensoty attached to the base of the non coronary leaflet that is likely nodular calcificaiton and is tthe site of origin of Aortic regurgitation. This appears unchanged from pror TTE. AV leaflets appears thickened in some views cannot exlude presence of endocarditis (clip # 5).    Aortic Valve: Mildly restricted motion. There is mild stenosis. Aortic valve area by VTI is 1.62 cm². Aortic valve peak velocity is 2.62 m/s. Mean gradient is 14 mmHg. The dimensionless index is 0.47.    Mitral Valve: There is moderate mitral annular calcification present. There is mild to moderate regurgitation.    Aorta: Aortic root is normal in size measuring 3.61 cm. Ascending aorta is normal measuring 2.80 cm.    Pulmonary Artery: The estimated pulmonary artery systolic pressure is 49 mmHg.    IVC/SVC: Intermediate venous pressure at 8 mmHg.    BNP 3075 (2000)    - Continue metoprolol  - Monitor on telemetry.  - Monitor strict Is&Os and daily weights. Place on fluid restriction of 1.5 L.   - Continue to stress to patient importance of self efficacy and  on diet for CHF.  - Volume management with PRN lasix and HD

## 2024-04-10 NOTE — PROGRESS NOTES
04/10/24 0445 04/10/24 0500   Vital Signs   Pulse 76 75   Resp (!) 22 (!) 37   SpO2 99 % 98 %   BP (!) 197/74 (!) 189/76   MAP (mmHg) 106 109   Pre-Hemodialysis Assessment   Treatment Status Completed  --         Hemodialysis AV Fistula Left upper arm   No placement date or time found.   Present Prior to Hospital Arrival?: Yes  Size/Length: 16 G  Location: Left upper arm   Site Assessment  --  Clean;Dry;Intact   Patency  --  Present;Thrill;Bruit   Status  --  Deaccessed   Dressing Intervention  --    (Gauze dressing applied and secured with tape after needles pulled.)   Dressing Status  --  Clean;Dry;Intact   Site Condition  --  No complications   Dressing  --  Gauze   During Hemodialysis Assessment   Blood Volume Processed (Liters) 43.3 L  --    UF Removed (mL) 2800 mL  --    Intra-Hemodialysis Comments Tolerated full treatment well. All blood reinfused.  --    Post-Hemodialysis Assessment   Rinseback Volume (mL)  --  250 mL   Blood Volume Processed (Liters)  --  43.3 L   Dialyzer Clearance  --  Lightly streaked   Duration of Treatment  --  180 minutes   Total UF (mL)  --  2800 mL   Net Fluid Removal  --  2000   Patient Response to Treatment  --  Responded well with adjustment in UF as ordered.   Post-Hemodialysis Comments  --  LUE AVF site stable. Oriented x3 and denies any discomfort.

## 2024-04-10 NOTE — CODE/ RAPID DOCUMENTATION
RAPID RESPONSE NURSE NOTE        Admit Date: 2024  LOS: 0  Code Status: Full Code   Date of Consult: 04/10/2024  : 1955  Age: 68 y.o.  Weight:   Wt Readings from Last 1 Encounters:   24 84.4 kg (186 lb)     Sex: male  Race: Black or    Bed: [address] [place] [address] A:   MRN: [id number]  Time Rapid Response Team page Received:   Time Rapid Response Team at Bedside:   Time Rapid Response Team left Bedside:   Was the patient discharged from an ICU this admission? No   Was the patient discharged from a PACU within last 24 hours? No   Did the patient receive conscious sedation/general anesthesia in last 24 hours? No  Was the patient in the ED within the past 24 hours? Yes  Was the patient on NIPPV within the past 24 hours? No   Did this progress into an ARC or CPA: No  Attending Physician: Temi Traylor MD  Primary Service: [facility]       SITUATION    Notified by bedside RN via phone call.  Reason for alert: Shortness of breath  Called to evaluate the patient for Respiratory    BACKGROUND     Why is the patient in the hospital?: Chest pain    Patient has a past medical history of Atrial fibrillation, CAD (coronary artery disease), CHF (congestive heart failure), CKD (chronic kidney disease) stage 3, GFR 30-59 ml/min, CKD (chronic kidney disease) stage 5, GFR less than 15 ml/min, COVID-19, Diverticulosis, Hyperlipidemia, Hypertension, Keloid cicatrix, Metabolic bone disease, Other emphysema, Pericarditis, S/P kidney transplant, Thrombocytopenia, Thyroid disease, and Tobacco use.    Last Vitals:  Temp: 98.3 °F (36.8 °C) (2207)  Pulse: 69 (2123)  Resp: 20 (1923)  BP: 152/72 (2123)  SpO2: 98 % (2207)    24 Hours Vitals Range:  Temp:  [97.1 °F (36.2 °C)-98.3 °F (36.8 °C)]   Pulse:  []   Resp:  [14-22]   BP: (107-163)/(56-80)   SpO2:  [95 %-100 %]     Labs:  Recent Labs     24  1157 24 24  2214   WBC 6.22 7.00  --    HGB 8.2* 7.1*  --     HCT 27.2* 22.8* 27*    255  --        Recent Labs     04/09/24  1157 04/09/24  2219    141   K 3.6 4.2    107   CO2 20* 20*   BUN 29* 31*   CREATININE 5.8* 5.9*   GLU 92 119*   PHOS  --  2.8   MG  --  2.1        Recent Labs     04/09/24  2214   PH 7.337*   PCO2 41.4   PO2 75*   HCO3 22.2*   POCSATURATED 94   BE -4*        ASSESSMENT    Physical Exam  Constitutional:       Appearance: He is ill-appearing and diaphoretic.   Pulmonary:      Effort: Tachypnea, accessory muscle usage and respiratory distress present.   Skin:     General: Skin is warm and moist.   Neurological:      Mental Status: He is lethargic.   Psychiatric:         Behavior: Behavior is agitated.     Temp 98.3  HR 98  SPO2 98% on 6L NC  /66 (95)      INTERVENTIONS    The patient was seen for Respiratory problem. Staff concerns included tachypnea, new onset of difficulty breathing, increased WOB, and increased oxygen requirements. The following interventions were performed: supplemental oxygen, POCT arterial blood gas , portable chest x-ray, continuous pulse ox monitoring, and continuous cardiac monitoring. Critical care cx    Notified by Bedside Bev BEVERLY of new onset shortness of breath. Upon arrival to the bedside, patient O2 Sats 89-90% on 4L,. Patient SOB, restless, in tripod position. Patient refusing non-rebreather. O2 titrated to 6L with sats improving to 98-99%. EKG, ABG, and labs done. US IV placed - gave 80 mg of lasix. Patient has remained restless throughout encounter. Critical care consulted - patient transported to MICU with RN x 4. Report given to RUSH Mendez.    RECOMMENDATIONS    We recommend: Care per primary team    PROVIDER ESCALATION    Orders received and case discussed with  CAROLANN Mei MD .    Primary team arrival time: 2230    Disposition: Tx in ICU bed 6092.    FOLLOW UP    Bedside Bev BEVERLY  updated on plan of care. Instructed to call the Rapid Response Nurse, Amanda Mcknight RN at [phone] for  additional questions or concerns.

## 2024-04-10 NOTE — SUBJECTIVE & OBJECTIVE
"Past Medical History:   Diagnosis Date    Atrial fibrillation     CAD (coronary artery disease) 2006    MI in 2006    CHF (congestive heart failure) 2017    CKD (chronic kidney disease) stage 3, GFR 30-59 ml/min     Dr. Latif.  in transplanted kidney    CKD (chronic kidney disease) stage 5, GFR less than 15 ml/min 02/02/2024    COVID-19 02/07/2023    Diverticulosis     Hyperlipidemia     Hypertension     Keloid cicatrix     Metabolic bone disease     Other emphysema 10/01/2019    Pericarditis     S/P kidney transplant 1992    x2 (1992 & 2005),    Thrombocytopenia     Thyroid disease     Tobacco use 09/05/2014       Past Surgical History:   Procedure Laterality Date    AV FISTULA PLACEMENT Left 12/18/2023    Procedure: CREATION, AV FISTULA;  Surgeon: JUANI Melendez III, MD;  [facility];  Service: Vascular;  Laterality: Left;  LUE AVF creation vs AVG insertion    CARDIOVERSION  04/30/15    CHOLECYSTECTOMY      COLONOSCOPY  April 20, 2011    Diverticulosis, repeat recommended in 3 yrs., repeat colonoscopy 2014 revealed 2 polyps.  He should return in 5 years.    COLONOSCOPY N/A 3/13/2020    Procedure: COLONOSCOPY;  Surgeon: Chon Casper MD;  Location: [facility] (4TH FLR);  Service: Endoscopy;  Laterality: N/A;  ok to hold coumadin x5days-see telephone encounter 2/4/20-tb    COLONOSCOPY N/A 2/4/2021    Procedure: COLONOSCOPY;  Surgeon: Chon Casper MD;  Location: [facility] (4TH FLR);  Service: Endoscopy;  Laterality: N/A;  Eliquis - per Dr. GAVIN Blunt ok to hold x 2 days and "restarted asap"- ERW  last prep "poor", [id number] ordered/ Pt requests Dr. Casper  prep ins. mailed - COVID screening on 2/1/21 PCW- ERW    COLONOSCOPY N/A 3/3/2021    Procedure: COLONOSCOPY;  Surgeon: Chon Casper MD;  Location: [facility] (4TH FLR);  Service: Endoscopy;  Laterality: N/A;  Patient with his second poor bowel pre and has poor prep today.  If patient not intersted or can't get colonoscopy tomorrow will " need constipation bowel prep and will need to restart his Eliquis today.Thanks,Chon     per Dr Lopez to hold Eliquis 2 days prior      COVID     CORONARY ANGIOGRAPHY N/A 2/28/2024    Procedure: ANGIOGRAM, CORONARY ARTERY;  Surgeon: Tylor Lincoln MD;  Location: [facility] CATH LAB;  Service: Cardiology;  Laterality: N/A;    CORONARY ANGIOPLASTY WITH STENT PLACEMENT  9/13/2006    FISTULOGRAM N/A 2/19/2024    Procedure: Fistulogram;  Surgeon: JUANI Melendez III, MD;  Location: [facility] CATH LAB;  Service: Peripheral Vascular;  Laterality: N/A;    IRRIGATION AND DEBRIDEMENT Right 8/30/2023    Procedure: IRRIGATION AND DEBRIDEMENT, RIGHT MIDDLE FINGER;  Surgeon: Martin Larsen MD;  [facility];  Service: Orthopedics;  Laterality: Right;    KIDNEY TRANSPLANT  2005    LEFT HEART CATHETERIZATION N/A 2/26/2024    Procedure: Left heart cath;  Surgeon: Tylor Lincoln MD;  Location: [facility] CATH LAB;  Service: Cardiology;  Laterality: N/A;    PARATHYROIDECTOMY      PERCUTANEOUS TRANSLUMINAL ANGIOPLASTY OF ARTERIOVENOUS FISTULA Left 2/19/2024    Procedure: PTA, AV FISTULA;  Surgeon: JUANI Melendez III, MD;  Location: [facility] CATH LAB;  Service: Peripheral Vascular;  Laterality: Left;    STENT, DRUG ELUTING, SINGLE VESSEL, CORONARY N/A 2/28/2024    Procedure: Stent, Drug Eluting, Single Vessel, Coronary;  Surgeon: Tylor Lincoln MD;  Location: [facility] CATH LAB;  Service: Cardiology;  Laterality: N/A;    TREATMENT OF CARDIAC ARRHYTHMIA N/A 3/28/2022    Procedure: CARDIOVERSION;  Surgeon: Migue Blunt MD;  Location: [facility] EP LAB;  Service: Cardiology;  Laterality: N/A;  AF, DCC, MAC, GP, 3 PREP*RODRIGO deferred pt 100% compliant*       Review of patient's allergies indicates:   Allergen Reactions    Ace inhibitors Swelling    Verapamil Other (See Comments)     Other reaction(s): Unknown    Povidone-iodine Itching       No current facility-administered medications on file prior to encounter.     Current  Outpatient Medications on File Prior to Encounter   Medication Sig    acetaminophen (TYLENOL) 500 MG tablet Take 1 tablet (500 mg total) by mouth every 6 (six) hours as needed for Pain.    albuterol (VENTOLIN HFA) 90 mcg/actuation inhaler Inhale 2 puffs into the lungs every 6 (six) hours as needed for Wheezing or Shortness of Breath. Rescue    albuterol-ipratropium (DUO-NEB) 2.5 mg-0.5 mg/3 mL nebulizer solution Take 3 mLs by nebulization every 6 (six) hours as needed for Wheezing. Rescue. Can be used in place of albuterol inhaler.    amiodarone (PACERONE) 200 MG Tab Take 1 tablet (200 mg total) by mouth once daily.    apixaban (ELIQUIS) 5 mg Tab Take 1 tablet (5 mg total) by mouth 2 (two) times daily.    aspirin (ECOTRIN) 81 MG EC tablet Take 1 tablet (81 mg total) by mouth once daily.    atorvastatin (LIPITOR) 80 MG tablet Take 1 tablet (80 mg total) by mouth once daily.    b complex vitamins (B COMPLEX-VITAMIN B12) tablet Take 1 tablet by mouth once daily.    calcium carbonate (CALCIUM 600 ORAL) Take 1 tablet by mouth 2 (two) times a day.    CHOLECALCIFEROL, VITAMIN D3, ORAL Take 2 tablets by mouth 2 (two) times daily.    clopidogreL (PLAVIX) 75 mg tablet Take 1 tablet (75 mg total) by mouth once daily.    doxazosin (CARDURA) 4 MG tablet Take 1 tablet (4 mg total) by mouth every 12 (twelve) hours.    famotidine (PEPCID) 20 MG tablet Take 1 tablet (20 mg total) by mouth 2 (two) times daily.    fexofenadine HCl (ALLEGRA ORAL) Take 1 tablet by mouth daily as needed (allergies).    fluticasone furoate-vilanteroL (BREO) 100-25 mcg/dose diskus inhaler Inhale 1 puff into the lungs once daily. Controller. Use this inhaler every day.    fluticasone propionate (FLONASE) 50 mcg/actuation nasal spray 1 spray (50 mcg total) by Each Nostril route daily as needed for Allergies.    FLUZONE HIGHDOSE QUAD 23-24  mcg/0.7 mL Syrg     furosemide (LASIX) 20 MG tablet Take 4 tablets (80 mg total) by mouth 2 (two) times daily as  needed. For worsening LE swelling or shortness of breath on non-dialysis days    gabapentin (NEURONTIN) 100 MG capsule Take 1 capsule (100 mg total) by mouth as needed (pain).    hydrALAZINE (APRESOLINE) 50 MG tablet TAKE 1 TABLET (50 MG TOTAL) BY MOUTH 3 (THREE) TIMES DAILY.    HYDROcodone-acetaminophen (NORCO) 5-325 mg per tablet Take 1 tablet by mouth every 6 (six) hours as needed for Pain.    metoprolol succinate (TOPROL-XL) 25 MG 24 hr tablet Take 0.5 tablets (12.5 mg total) by mouth once daily.    MITIGARE 0.6 mg Cap TAKE 1 CAPSULE BY MOUTH TWICE A DAY AS NEEDED GOUT FLARE UP TO 3 DAYS    multivitamin (THERAGRAN) per tablet Take 1 tablet by mouth Daily.    NIFEdipine (PROCARDIA-XL) 60 MG (OSM) 24 hr tablet Take 1 tablet (60 mg total) by mouth 2 (two) times a day.    nitroGLYCERIN (NITROSTAT) 0.4 MG SL tablet Place 1 tablet (0.4 mg total) under the tongue every 5 (five) minutes as needed for Chest pain.    omeprazole (PRILOSEC) 20 MG capsule Take 1 capsule (20 mg total) by mouth daily as needed (heartburn).    paricalcitoL (ZEMPLAR) 1 MCG capsule TAKE 1 CAPSULE ON MONDAY, WEDNESDAY AND FRIDAY    [facility] COVID [facility],12Y UP,,PF, 30 mcg/0.3 mL injection Inject into the muscle.    predniSONE (DELTASONE) 5 MG tablet Take 1 tablet (5 mg total) by mouth once daily.    pulse oximeter (PULSE OXIMETER) device by Apply Externally route 2 (two) times a day. Use twice daily at 8 AM and 3 PM and record the value in MyChart as directed.    sevelamer carbonate (RENVELA) 800 mg Tab Take 1 tablet (800 mg total) by mouth 3 (three) times daily with meals.    SPIKEVAX [id number],12Y UP,,PF, 50 mcg/0.5 mL injection     suvorexant (BELSOMRA) 5 mg Tab Take 5 mg by mouth nightly as needed (insomnia).    tacrolimus (PROGRAF) 1 MG Cap Take 3 capsules (3 mg total) by mouth every 12 (twelve) hours for 60 days, THEN 2 capsules (2 mg total) every 12 (twelve) hours for 60 days, THEN 1 capsule (1 mg total) every 12 (twelve) hours for 60 days,  THEN 1 capsule (1 mg total) once daily.    tiotropium bromide (SPIRIVA RESPIMAT) 1.25 mcg/actuation inhaler Inhale 2 puffs into the lungs once daily. Controller. Use this inhaler every day.     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     Patient is currently retired   Substance and Sexual Activity    Alcohol use: No    Drug use: No    Sexual activity: Yes     Partners: Female     Review of Systems   Constitutional: Negative for chills and fever.   Cardiovascular:  Negative for chest pain, orthopnea and palpitations.   Respiratory:  Negative for cough and shortness of breath.    Gastrointestinal:  Negative for abdominal pain.   Neurological:  Negative for dizziness, headaches and light-headedness.   Psychiatric/Behavioral:  Negative for altered mental status.      Objective:     Vital Signs (Most Recent):  Temp: 98.3 °F (36.8 °C) (24)  Pulse: 73 (24)  Resp: 20 (24)  BP: (!) 155/78 (24)  SpO2: 100 % (24) Vital Signs (24h Range):  Temp:  [97.1 °F (36.2 °C)-98.3 °F (36.8 °C)] 98.3 °F (36.8 °C)  Pulse:  [] 73  Resp:  [14-22] 20  SpO2:  [95 %-100 %] 100 %  BP: (107-163)/(56-80) 155/78     Weight: 84.4 kg (186 lb)  Body mass index is 24.54 kg/m².    SpO2: 100 %       No intake or output data in the 24 hours ending 24    Lines/Drains/Airways       Peripheral Intravenous Line  Duration  "                 Hemodialysis AV Fistula Left upper arm -- days         Hemodialysis AV Fistula 02/19/24 0924 Left upper arm 50 days         Peripheral IV - Single Lumen 04/09/24 1133 20 G Posterior;Right Forearm <1 day                     Physical Exam  Vitals and nursing note reviewed.   Constitutional:       General: He is not in acute distress.     Appearance: Normal appearance. He is ill-appearing. He is not toxic-appearing.   HENT:      Head: Normocephalic and atraumatic.      Mouth/Throat:      Mouth: Mucous membranes are moist.   Cardiovascular:      Rate and Rhythm: Normal rate and regular rhythm.      Heart sounds: No murmur heard.     No friction rub. No gallop.   Pulmonary:      Effort: Pulmonary effort is normal. No respiratory distress.      Breath sounds: Normal breath sounds.   Abdominal:      Palpations: Abdomen is soft.   Musculoskeletal:      Right lower leg: No edema.      Left lower leg: No edema.      Comments: Left arm fistula, good thrill   Skin:     General: Skin is warm.   Neurological:      Mental Status: He is alert and oriented to person, place, and time. Mental status is at baseline.          Significant Labs: BMP:   Recent Labs   Lab 04/09/24  1157   GLU 92      K 3.6      CO2 20*   BUN 29*   CREATININE 5.8*   CALCIUM 8.4*   , CMP   Recent Labs   Lab 04/09/24  1157      K 3.6      CO2 20*   GLU 92   BUN 29*   CREATININE 5.8*   CALCIUM 8.4*   PROT 6.8   ALBUMIN 3.0*   BILITOT 0.6   ALKPHOS 81   AST 16   ALT 17   ANIONGAP 14   , CBC   Recent Labs   Lab 04/09/24  1157   WBC 6.22   HGB 8.2*   HCT 27.2*      , INR No results for input(s): "INR", "PROTIME" in the last 48 hours., Troponin   Recent Labs   Lab 04/09/24  1157 04/09/24  1419 04/09/24  1729   TROPONINI 0.035* 0.417* 1.930*   , and All pertinent lab results from the last 24 hours have been reviewed.    Significant Imaging: Echocardiogram: Transthoracic echo (TTE) complete (Cupid Only):   Results for " orders placed or performed during the hospital encounter of 02/23/24   Echo   Result Value Ref Range    BSA 2.13 m2    LVOT stroke volume 85.85 cm3    LVIDd 5.37 3.5 - 6.0 cm    LV Systolic Volume 66.14 mL    LV Systolic Volume Index 31.2 mL/m2    LVIDs 3.91 2.1 - 4.0 cm    LV Diastolic Volume 139.45 mL    LV Diastolic Volume Index 65.78 mL/m2    IVS 1.0 0.6 - 1.1 cm    LVOT diameter 2.1 cm    LVOT area 3.5 cm2    FS 27 (A) 28 - 44 %    Left Ventricle Relative Wall Thickness 0.37 cm    Posterior Wall 1.0 0.6 - 1.1 cm    LV mass 204.82 g    LV Mass Index 97 g/m2    MV Peak E Aris 1.25 m/s    TDI LATERAL 0.09 m/s    TDI SEPTAL 0.07 m/s    E/E' ratio 15.63 m/s    MV Peak A Aris 0.68 m/s    TR Max Aris 3.21 m/s    E/A ratio 1.84     E wave deceleration time 240.56 msec    LV SEPTAL E/E' RATIO 17.86 m/s    LA Volume Index 49.8 mL/m2    LV LATERAL E/E' RATIO 13.89 m/s    LA volume 105.57 cm3    LVOT peak aris 1.05 m/s    RVDD 4.21 cm    TAPSE 2.53 cm    LA size 4.65 cm    Left Atrium Minor Axis 6.18 cm    Left Atrium Major Axis 5.26 cm    LA volume (mod) 95.00 cm3    LA WIDTH 4.70 cm    LA Volume Index (Mod) 44.8 mL/m2    RA Major Axis 5.76 cm    RA Width 4.82 cm    AV mean gradient 14 mmHg    AV peak gradient 27 mmHg    Ao peak aris 2.62 m/s    Ao VTI 52.95 cm    LVOT peak VTI 24.80 cm    AV valve area 1.62 cm²    AV Velocity Ratio 0.40     AV index (prosthetic) 0.47     VERONICA by Velocity Ratio 1.39 cm²    MV stenosis pressure 1/2 time 69.76 ms    MV valve area p 1/2 method 3.15 cm2    Triscuspid Valve Regurgitation Peak Gradient 41 mmHg    PV PEAK VELOCITY 3.31 m/s    PV peak gradient 44 mmHg    Sinus 3.61 cm    STJ 2.64 cm    Ascending aorta 2.80 cm    Mean e' 0.08 m/s    ZLVIDS -0.35     ZLVIDD -2.21     TV resting pulmonary artery pressure 49 mmHg    RV TB RVSP 11 mmHg    Est. RA pres 8 mmHg    RA area 22.0 cm2    Narrative      Left Ventricle: There is normal systolic function with a visually   estimated ejection fraction  of 60 - 65%. Grade II diastolic dysfunction.   Elevated left ventricular filling pressure.    Right Ventricle: Normal right ventricular cavity size. Wall thickness   is normal. Right ventricle wall motion  is normal. Systolic function is   normal.    Left Atrium: Left atrium is moderately dilated.    Right Atrium: Right atrium is moderately dilated.    Aortic Valve: The aortic valve is a trileaflet valve. There is moderate   aortic valve sclerosis. There is moderate annular calcification present.   There is mild to moderate aortic regurgitation. There appers to be mobile   echodensoty attached to the base of the non coronary leaflet that is   likely nodular calcificaiton and is tthe site of origin of Aortic   regurgitation. This appears unchanged from pror TTE.    Aortic Valve: Mildly restricted motion. There is mild stenosis. Aortic   valve area by VTI is 1.62 cm². Aortic valve peak velocity is 2.62 m/s.   Mean gradient is 14 mmHg. The dimensionless index is 0.47.    Mitral Valve: There is moderate mitral annular calcification present.   There is mild to moderate regurgitation.    Aorta: Aortic root is normal in size measuring 3.61 cm. Ascending aorta   is normal measuring 2.80 cm.    Pulmonary Artery: The estimated pulmonary artery systolic pressure is   49 mmHg.    IVC/SVC: Intermediate venous pressure at 8 mmHg.

## 2024-04-10 NOTE — PLAN OF CARE
[facility] - Cardiac Medical ICU  Initial Discharge Assessment       Primary Care Provider: Anatoliy Colindres MD    Admission Diagnosis: Acute chest pain [R07.9]  Atrial fibrillation with rapid ventricular response [I48.91]  Chest pain [R07.9]    Admission Date: 4/9/2024  Expected Discharge Date: 4/11/2024    Transition of Care Barriers: None    Payor: HUMANA MANAGED MEDICARE / Plan: HUMANA MEDICARE HMO / Product Type: Capitation /     Extended Emergency Contact Information  Primary Emergency Contact: Shea Phelps  Address: [address] [place] [address] [facility]  Home Phone: [phone]  Mobile Phone: [phone]  Relation: Spouse  Preferred language: English  Secondary Emergency Contact: Mary Becerra  Address: [address] [place] [address] [facility]  Home Phone: [phone]  Mobile Phone: [phone]  Relation: Daughter    Discharge Plan A: Home with family  Discharge Plan B: Home with family      [facility]/pharmacy #[id number] - [place], [place] - [address]  [address]  [facility] [address]  Phone: [phone] Fax: [phone]    [facility]  [address]  [facility] [address]  Phone: [phone] Fax: [phone]        Transferred from:     Past Medical History:   Diagnosis Date    Atrial fibrillation     CAD (coronary artery disease) 2006    MI in 2006    CHF (congestive heart failure) 2017    CKD (chronic kidney disease) stage 3, GFR 30-59 ml/min     Dr. Latif.  in transplanted kidney    CKD (chronic kidney disease) stage 5, GFR less than 15 ml/min 02/02/2024    COVID-19 02/07/2023    Diverticulosis     Hyperlipidemia     Hypertension     Keloid cicatrix     Metabolic bone disease     Other emphysema 10/01/2019    Pericarditis     S/P kidney transplant 1992    x2 (1992 & 2005),    Thrombocytopenia     Thyroid disease     Tobacco use 09/05/2014         CM met with patient and Shea Phelps  (spouse) [phone]  in room for Discharge Planning Assessment.  Patient was able to answer questions.  Per patient, he lives with Shea Phelps (spouse) [phone]  in a 1-story home with 2 step(s) to enter.   Per patient, he was independent with ADLS and used a rollator sometimes for ambulation. Per patient, he is on dialysis with [facility] on T-th-Sa, and patient does not take Coumadin. Patient takes Eliquis and Plavix.  PCP and pharmacy verified. Patient does not have home health and, has not been hospitalized in last 30 days.  Patient will have help from Shea Phelps (spouse) [phone], Mary JAYESHDayan Becerra (daughter) [phone]  upon discharge.   Discharge Planning Booklet given to patient/family and discussed.  All questions addressed.  CM will follow for needs.    Discharge Plan A and Plan B have been determined by review of patient's clinical status, future medical and therapeutic needs, and coverage/benefits for post-acute care in coordination with multidisciplinary team members.      Initial Assessment (most recent)       Adult Discharge Assessment - 04/10/24 1137          Discharge Assessment    Assessment Type Discharge Planning Assessment     Confirmed/corrected address, phone number and insurance Yes     Confirmed Demographics Correct on Facesheet     Source of Information patient;family     When was your last doctors appointment? 03/24/24     Communicated RAMU with patient/caregiver No     Reason For Admission Acute chest pain     People in Home spouse     Facility Arrived From: Home     Do you expect to return to your current living situation? Yes     Do you have help at home or someone to help you manage your care at home? Yes     Who are your caregiver(s) and their phone number(s)? Shea Phelps (spouse) [phone]     Prior to hospitilization cognitive status: Alert/Oriented     Current cognitive status: Alert/Oriented     Walking or Climbing Stairs Difficulty no      Dressing/Bathing Difficulty no     Equipment Currently Used at Home rollator   but does not use it.    Readmission within 30 days? No     Patient currently being followed by outpatient case management? No     Do you currently have service(s) that help you manage your care at home? No     Do you take prescription medications? Yes     Do you have prescription coverage? Yes     Coverage HUMANA MANAGED MEDICARE - HUMANA MEDICARE HMO - CAPITATED     Do you have any problems affording any of your prescribed medications? No     Is the patient taking medications as prescribed? yes     Who is going to help you get home at discharge? Shea Phelps (spouse) [phone], Mary Becerra (daughter) [phone]     How do you get to doctors appointments? car, drives self     Are you on dialysis? Yes     Dialysis Name and Scheduled days [facility] T-Th-Sa     Do you take coumadin? No   patient takes Eliquis and Plavix    Discharge Plan A Home with family     Discharge Plan B Home with family     DME Needed Upon Discharge  other (see comments)   TBD    Discharge Plan discussed with: Spouse/sig other;Patient     Name(s) and Number(s) Shea Phelps (spouse) [phone]     Transition of Care Barriers None        Physical Activity    On average, how many days per week do you engage in moderate to strenuous exercise (like a brisk walk)? 0 days     On average, how many minutes do you engage in exercise at this level? 0 min        Financial Resource Strain    How hard is it for you to pay for the very basics like food, housing, medical care, and heating? Not very hard        Housing Stability    In the last 12 months, was there a time when you were not able to pay the mortgage or rent on time? No     In the last 12 months, how many places have you lived? 1     In the last 12 months, was there a time when you did not have a steady place to sleep or slept in a shelter (including now)? No        Transportation Needs    In the  past 12 months, has lack of transportation kept you from medical appointments or from getting medications? No     In the past 12 months, has lack of transportation kept you from meetings, work, or from getting things needed for daily living? No        Food Insecurity    Within the past 12 months, you worried that your food would run out before you got the money to buy more. Never true     Within the past 12 months, the food you bought just didn't last and you didn't have money to get more. Never true        Stress    Do you feel stress - tense, restless, nervous, or anxious, or unable to sleep at night because your mind is troubled all the time - these days? Rather much        Social Connections    In a typical week, how many times do you talk on the phone with family, friends, or neighbors? More than three times a week     How often do you get together with friends or relatives? More than three times a week     How often do you attend Sabianism or Sabianism services? More than 4 times per year     Do you belong to any clubs or organizations such as Sabianism groups, unions, fraternal or athletic groups, or school groups? No     How often do you attend meetings of the clubs or organizations you belong to? Never     Are you , , , , never , or living with a partner?         Alcohol Use    Q1: How often do you have a drink containing alcohol? Never     Q2: How many drinks containing alcohol do you have on a typical day when you are drinking? Patient does not drink     Q3: How often do you have six or more drinks on one occasion? Never        OTHER    Name(s) of People in Home Shea Phelps (spouse) [phone]                            PCP:  Anatoliy Colindres MD  [phone]        Pharmacy:    [facility]/pharmacy #[id number] - [place], [place] - [address]  [address]  [place] [address]  Phone: [phone] Fax: [phone]    [facility]  [address]  [facility] [address]  Phone: [phone] Fax: [phone]        Emergency Contacts:  Extended Emergency Contact Information  Primary Emergency Contact: PhelpsPatShea  Address: [address] [place] [address] [id number] [place] [address]  Home Phone: [phone]  Mobile Phone: [phone]  Relation: Spouse  Preferred language: English  Secondary Emergency Contact: Mary Becerra  Address: [address] [place] [address] [id number] [place] [address]  Home Phone: [phone]  Mobile Phone: [phone]  Relation: Daughter      Insurance:    Payor: HUMANA MANAGED MEDICARE / Plan: HUMANA MEDICARE HMO / Product Type: Capitation /     Jacklyn Calero RN     [phone]      04/10/2024  12:03 PM

## 2024-04-10 NOTE — ASSESSMENT & PLAN NOTE
ESRD on iHD TTS  [facility] Gambino  ?EDW  MIRA AVF  + residual renal fx    Plan/Recommendations:  -No emergent need for RRT today  -will re-evaluate tomorrow for further needs  -RFP daily  -strict I/O's  -will defer EPO to his OP dialysis unit  -hold phos binders  -f/u with ECHO

## 2024-04-10 NOTE — ASSESSMENT & PLAN NOTE
Patient underwent coronary angiography 02/2024 and was found to have multivessel CAD. CTS had declined patient for CABG and patient under PCI to the RCA on 02/28/2024. Mid LAD 70% + OM1/OM2 70% obstruction remained medically managed as thought was that culprit vessel (RCA) was addressed.   Trop on admission 0.035 --> 0.4 --> 1.9-->3.78  Cardiology consulted, likely type II demand    - Trend trop to peak  - follow up TTE, ordered  - continue ACS protocol  - continue high intensity statin  - continue beta blocker  - NPO after midnight for possible repeat coronary angiogram per Cardiology  - NTG SL 0.4  q 5 min PRN for chest discomfort  - Cardiac telemetry

## 2024-04-10 NOTE — ASSESSMENT & PLAN NOTE
-Holding eliquis while on ACS protocol  -Continue home amiodarone, metoprolol  -plan for urgent dialysis

## 2024-04-10 NOTE — CONSULTS
Patient seen and evaluated by critical care medicine. To be admitted to ICU for further management. Full H&P to follow.     JUANY Mendiola, [facility]  Pulmonary Critical Care Medicine   04/09/2024

## 2024-04-10 NOTE — EICU
Nurses Note -- 4 Eyes      4/10/2024   1:36 AM      Skin assessed during: Transfer      [x] No Altered Skin Integrity Present    []Prevention Measures Documented      [] Yes- Altered Skin Integrity Present or Discovered   [] LDA Added if Not in Epic (Describe Wound)   [] New Altered Skin Integrity was Present on Admit and Documented in LDA   [] Wound Image Taken    Wound Care Consulted? No    Attending Nurse:  Andrea Croft RN    Second RN/Staff Member:   Amanda Marti RN

## 2024-04-10 NOTE — ASSESSMENT & PLAN NOTE
TThS schedule  Duration: 3.5 hrs  Access: CORINA   Residual Renal Function: minimal  Last HD prior to admission: 4/6    - Nephrology consulted  - Will dialyze for flash pulmonary edema  - metabolic profile daily

## 2024-04-10 NOTE — PROGRESS NOTES
[facility] - Cardiac Medical ICU  Nephrology  Progress Note    Patient Name: Ibrahima Phelps  MRN: [id number]  Admission Date: 4/9/2024  Hospital Length of Stay: 0 days  Attending Provider: Temi Traylor MD   Primary Care Physician: Anatoliy Colindres MD  Principal Problem:Chest pain    Subjective:     HPI: Ibrahima Phelps is a 69 yo male with CAD s/p PCI, HTN, HLD, HFpEF, AFib, hypothyroidism, and ESRD s/p KTx x 2 (remains on immunosuppressant medications) who presents to the ED via EMS on 4/9 for evaluation of CP located to R chest.  He described the pain as burning associated with SOB, nausea and dizziness.  Upon arrival to the ED he was found to be in AFib with RVR which self converted and symptoms improved with GI cocktail.  His last HD session was on Saturday and was scheduled to have today but reported to the ED instead.  Chemistries without emergent electrolyte abnormalities, trop 0.035, BNP 3075.  He denies any complaints at time of consultation.  Nephrology has been consulted for ESRD management while IP.    Interval History:   Overnight, patient had rapid response for respiratory distress and flash pulmonary edema.  He was stepped up to ICU and placed on BiPAP.  Nephrology was notified overnight and he underwent emergent HD with 2L net fluid removal.  SOB has since resolved, oxygenating well on RA at time of evaluation.  He denies any CP.  Formal ECHO pending, Troponin 4.5 this morning.    Review of patient's allergies indicates:   Allergen Reactions    Ace inhibitors Swelling    Verapamil Other (See Comments)     Other reaction(s): Unknown    Povidone-iodine Itching     Current Facility-Administered Medications   Medication Frequency    0.9%  NaCl infusion (for blood administration) Q24H PRN    acetaminophen tablet 650 mg Q4H PRN    albuterol-ipratropium 2.5 mg-0.5 mg/3 mL nebulizer solution 3 mL Q4H PRN    aluminum-magnesium hydroxide-simethicone 200-200-20 mg/5 mL suspension 30 mL QID PRN    amiodarone tablet 200  mg Daily    aspirin chewable tablet 81 mg Daily    atorvastatin tablet 80 mg Daily    bisacodyL suppository 10 mg Daily PRN    clopidogreL tablet 75 mg Daily    dextrose 10% bolus 125 mL 125 mL PRN    dextrose 10% bolus 250 mL 250 mL PRN    doxazosin tablet 4 mg Q12H    fluticasone furoate-vilanteroL 100-25 mcg/dose diskus inhaler 1 puff Daily    fluticasone propionate 50 mcg/actuation nasal spray 50 mcg Daily    gabapentin capsule 100 mg PRN    glucagon (human recombinant) injection 1 mg PRN    glucose chewable tablet 16 g PRN    glucose chewable tablet 24 g PRN    heparin 25,000 units in dextrose 5% (100 units/ml) IV bolus from bag LOW INTENSITY nomogram - [facility] PRN    heparin 25,000 units in dextrose 5% (100 units/ml) IV bolus from bag LOW INTENSITY nomogram - [facility] PRN    heparin 25,000 units in dextrose 5% 250 mL (100 units/mL) infusion LOW INTENSITY nomogram - [facility] Continuous    hydrALAZINE tablet 50 mg TID    HYDROcodone-acetaminophen  mg per tablet 1 tablet Q6H PRN    HYDROcodone-acetaminophen 5-325 mg per tablet 1 tablet Q6H PRN    isosorbide mononitrate 24 hr tablet 30 mg Daily    magnesium sulfate 2g in water 50mL IVPB (premix) Once    melatonin tablet 6 mg Nightly PRN    metoprolol succinate (TOPROL-XL) 24 hr split tablet 12.5 mg Daily    multivitamin tablet Daily    mupirocin 2 % ointment BID    naloxone 0.4 mg/mL injection 0.02 mg PRN    niCARdipine 40 mg/200 mL (0.2 mg/mL) infusion Continuous    NIFEdipine 24 hr tablet 60 mg BID    nitroGLYCERIN SL tablet 0.4 mg Q5 Min PRN    ondansetron disintegrating tablet 8 mg Q8H PRN    pantoprazole EC tablet 40 mg Daily    paricalcitoL capsule 1 mcg Every Mon, Wed, Fri    polyethylene glycol packet 17 g BID PRN    predniSONE tablet 5 mg Daily    prochlorperazine injection Soln 5 mg Q6H PRN    simethicone chewable tablet 80 mg QID PRN    sodium chloride 0.9% flush 10 mL Q12H PRN    tacrolimus capsule 3 mg BID    tiotropium bromide 1.25 mcg/actuation inhaler 2  puff Daily    vitamin D 1000 units tablet 1,000 Units BID       Objective:     Vital Signs (Most Recent):  Temp: 98.2 °F (36.8 °C) (04/10/24 1200)  Pulse: 72 (04/10/24 1305)  Resp: (!) 27 (04/10/24 1305)  BP: (!) 153/63 (04/10/24 1305)  SpO2: 98 % (04/10/24 1305) Vital Signs (24h Range):  Temp:  [97.1 °F (36.2 °C)-99.7 °F (37.6 °C)] 98.2 °F (36.8 °C)  Pulse:  [64-98] 72  Resp:  [14-45] 27  SpO2:  [95 %-100 %] 98 %  BP: ()/(54-94) 153/63     Weight: 84.4 kg (186 lb) (04/09/24 1642)  Body mass index is 24.54 kg/m².  Body surface area is 2.08 meters squared.    I/O last 3 completed shifts:  In: 483.4 [I.V.:93.4; Blood:350; IV Piggyback:40]  Out: 2800 [Other:2800]     Physical Exam  Vitals and nursing note reviewed.   Constitutional:       General: He is not in acute distress.     Appearance: Normal appearance. He is not ill-appearing or toxic-appearing.   HENT:      Head: Normocephalic and atraumatic.      Nose: No congestion or rhinorrhea.      Mouth/Throat:      Mouth: Mucous membranes are moist.      Pharynx: No oropharyngeal exudate or posterior oropharyngeal erythema.   Eyes:      General: No scleral icterus.        Right eye: No discharge.         Left eye: No discharge.      Extraocular Movements: Extraocular movements intact.      Conjunctiva/sclera: Conjunctivae normal.   Cardiovascular:      Rate and Rhythm: Normal rate. Rhythm irregular.      Heart sounds: No murmur heard.     No friction rub. No gallop.      Comments: MIRA AVF ++  Pulmonary:      Effort: Pulmonary effort is normal. No respiratory distress.      Breath sounds: No wheezing or rales.   Abdominal:      General: Bowel sounds are normal. There is no distension.      Palpations: Abdomen is soft.      Tenderness: There is no abdominal tenderness.   Musculoskeletal:         General: No swelling.      Cervical back: Normal range of motion and neck supple.      Right lower leg: No edema.      Left lower leg: No edema.   Skin:     General: Skin is  warm and dry.   Neurological:      General: No focal deficit present.      Mental Status: He is alert and oriented to person, place, and time.          Significant Labs:  CBC:   Recent Labs   Lab 04/10/24  0605   WBC 9.38  9.38   RBC 3.02*  3.02*   HGB 7.7*  7.7*   HCT 24.6*  24.6*     232   MCV 82  82   MCH 25.5*  25.5*   MCHC 31.3*  31.3*     CMP:   Recent Labs   Lab 04/09/24  1157 04/09/24  2219 04/10/24  0605   GLU 92   < > 76   CALCIUM 8.4*   < > 8.1*   ALBUMIN 3.0*  --   --    PROT 6.8  --   --       < > 138   K 3.6   < > 4.1   CO2 20*   < > 25      < > 105   BUN 29*   < > 18   CREATININE 5.8*   < > 3.7*   ALKPHOS 81  --   --    ALT 17  --   --    AST 16  --   --    BILITOT 0.6  --   --     < > = values in this interval not displayed.        Assessment/Plan:     Cardiac/Vascular  * Chest pain  -Resolved @ time of evaluation  -Trop 0.035 --> 4.5   -on ACS protocol, ECHO pending      Renal/  ESRD (end stage renal disease) on dialysis  ESRD on iHD TTS  [facility]  ?EDW  MIRA AVF  + residual renal fx    Plan/Recommendations:  -No emergent need for RRT today  -will re-evaluate tomorrow for further needs  -RFP daily  -strict I/O's  -will start EPO with HD sessions  -hold phos binders  -f/u with ECHO        Noe Shankar NP  Nephrology  [facility] - Cardiac Medical ICU

## 2024-04-10 NOTE — CODE/ RAPID DOCUMENTATION
RRN IV START       Ultrasound used to obtain labs   Please call Rapid Response RN, Amanda Mcknight RN with any questions or concerns at [phone].

## 2024-04-10 NOTE — CHAPLAIN
04/09/24 1305   Clinical Encounter Type   Visit Type Initial Visit   Visit Category Critical Care   Visited With Patient and family together   Number of Family Visited 1   Length of Visit 10 Minutes   Patient Spiritual Encounters   Care Provided Prayer support;Compassionate presence   Patient Coping Non-verbal   Family Spiritual Encounters   Care Provided Prayer support;Compassionate presence;Reflective listening   Family Coping Accepting;Open/discussion;Sadness;Living with uncertainty;Active luis a;Internal strength;Family/ friends resources;Using luis a/ community resources;Relying on spiritual resources   Comments - Family Visited briefly w/pt and his wife after he was admitted to MICU from 3H (Rapid Response). Educated regarding the services offered by the Spiritual Care Dept. and told them chaplains are available in-house 24/7 to offer support. Pt's wife will notify their local  tomorrow and expects him to visit. Offered prayer at bedside before leaving. No other needs expressed at this time.

## 2024-04-10 NOTE — PROGRESS NOTES
04/09/24 2123   Vital Signs   Temp 98 °F (36.7 °C)   Temp Source Oral   Pulse 69   SpO2 100 %   Flow (L/min) 3   Device (Oxygen Therapy) room air   BP (!) 152/72   MAP (mmHg) 103   BP Location Right arm   BP Method Automatic   Patient Position Lying     Pt arrived floor, VSS. Pt has no pain or discomfort at this time. Skin assessment done.     Nurses Note -- 4 Eyes      Skin assessed during: Admit      [x] No Altered Skin Integrity Present    []Prevention Measures Documented      [] Yes- Altered Skin Integrity Present or Discovered   [] LDA Added if Not in Epic (Describe Wound)   [] New Altered Skin Integrity was Present on Admit and Documented in LDA   [] Wound Image Taken  [] Already in LDA    Wound Care Consulted? No    Attending Nurse:  Jacob Brooks RN     Second RN/Staff Member:  Corbin RN

## 2024-04-10 NOTE — ASSESSMENT & PLAN NOTE
Patient is a 69 yo male with a PMHx of paroxysmal atrial fibrillation on amiodarone 200mg qd and apixaban 5mg bid, tachy-loan syndrome, HFpEF with most recent LVEF 65%, CAD s/p PCI 2006 on plavix, HTN, HLD, aortic atherosclerosis, secondary hyperparathyroidism, diverticulosis, COPD, GERD, hypothyroidism, gout, a history of tobacco use, obesity, ESRD s/p failed kidney transplant x2 (1992, 2005) on chronic immunosuppressive medication now on hemodialysis who was admitted to [facility] on 04/09/2024 with excrutiating chest pain. ECG with NSR with inferolateral ST depressions and non-specific changes. Patient underwent coronary angiography 02/2024 and was found to have multivessel CAD. CTS had declined patient for CABG and patient under PCI to the RCA on 02/28/2024. Mid LAD 70% + OM1/OM2 70% obstruction remained medically managed as thought was that culprit vessel (RCA) was addressed.   Trop on admission 0.035 --> 0.4 --> 1.9.  - Trend trop to peak then stop once peaked  - Formal TTE  - Hold apixaban for now while starting ACS protocol  - Start ACS protocol  - Continue high intensity statin  - Continue beta blocker  - Keep NPO after midnight for possible repeat coronary angiogram, reassess in AM  - NTG SL 0.4  q 5 min PRN for chest discomfort  - STAT ECG if patient has chest discomfort and call Cardiology   - Monitor telemetry

## 2024-04-10 NOTE — CONSULTS
[facility] - Cardiac Medical ICU  Cardiology  Consult Note    Patient Name: Ibrahima Phelps  MRN: [id number]  Admission Date: 4/9/2024  Hospital Length of Stay: 0 days  Code Status: Full Code   Attending Provider: Temi Traylor MD   Consulting Provider: Jose Mathew MD  Primary Care Physician: Anatoliy Colindres MD  Principal Problem:Chest pain    Patient information was obtained from patient and past medical records.     Consults  Subjective:     Chief Complaint:  chest pain     HPI:   Patient is a 67 yo male with a PMHx of paroxysmal atrial fibrillation on amiodarone 200mg qd and apixaban 5mg bid, tachy-loan syndrome, HFpEF with most recent LVEF 65%, CAD s/p PCI 2006 on plavix, HTN, HLD, aortic atherosclerosis, secondary hyperparathyroidism, diverticulosis, COPD, GERD, hypothyroidism, gout, a history of tobacco use, obesity, ESRD s/p failed kidney transplant x2 (1992, 2005) on chronic immunosuppressive medication now on hemodialysis who was admitted to [facility] on 04/09/2024 with excrutiating chest pain. Daughter and son in law in room state that mother was present was EMS was present and afib was reported. Unclear if patient was in RVR or afib at all since no rhythm strips were sent with the patient or uploaded to the media tab. Patient reports that he had 2.5 hrs (830 am to 11 am) of chest discomfort, he was diaphoretic, and it was substernal. Does not recall any radiation to jaw or left arm. It woke him up from sleep and reports he felt like effort/exercise worsened the discomfort    ECG with NSR with inferolateral ST depressions and non-specific changes. Patient underwent coronary angiography 02/2024 and was found to have multivessel CAD. CTS had declined patient for CABG and patient under PCI to the RCA on 02/28/2024. Mid LAD 70% + OM1/OM2 70% obstruction remained medically managed as thought was that culprit vessel (RCA) was addressed.   Trop on admission 0.035 --> 0.4 --> 1.9.      Interval History: Flashed ON 2/2  "hypertensive episode, required step up to ICU and NIPPV, diuresed with furosemide 80 overnight, improved and weaned to RA this morning. Satting well, conversing appropriately, no new cheat pain SOB improved. VSS      Past Medical History:   Diagnosis Date    Atrial fibrillation     CAD (coronary artery disease) 2006    MI in 2006    CHF (congestive heart failure) 2017    CKD (chronic kidney disease) stage 3, GFR 30-59 ml/min     Dr. Latif.  in transplanted kidney    CKD (chronic kidney disease) stage 5, GFR less than 15 ml/min 02/02/2024    COVID-19 02/07/2023    Diverticulosis     Hyperlipidemia     Hypertension     Keloid cicatrix     Metabolic bone disease     Other emphysema 10/01/2019    Pericarditis     S/P kidney transplant 1992    x2 (1992 & 2005),    Thrombocytopenia     Thyroid disease     Tobacco use 09/05/2014       Past Surgical History:   Procedure Laterality Date    AV FISTULA PLACEMENT Left 12/18/2023    Procedure: CREATION, AV FISTULA;  Surgeon: JUANI Melendez III, MD;  Location: [facility] OR [address] [place] [address];  Service: Vascular;  Laterality: Left;  LUE AVF creation vs AVG insertion    CARDIOVERSION  04/30/15    CHOLECYSTECTOMY      COLONOSCOPY  April 20, 2011    Diverticulosis, repeat recommended in 3 yrs., repeat colonoscopy 2014 revealed 2 polyps.  He should return in 5 years.    COLONOSCOPY N/A 3/13/2020    Procedure: COLONOSCOPY;  Surgeon: Chon Casper MD;  [facility]  Service: Endoscopy;  Laterality: N/A;  ok to hold coumadin x5days-see telephone encounter 2/4/20-tb    COLONOSCOPY N/A 2/4/2021    Procedure: COLONOSCOPY;  Surgeon: Chon Casper MD;  [facility]  Service: Endoscopy;  Laterality: N/A;  Eliquis - per Dr. GAVIN Blunt ok to hold x 2 days and "restarted asap"- ERW  last prep "poor", [id number] ordered/ Pt requests Dr. Casper  prep ins. mailed - COVID screening on 2/1/21 PCW- ERW    COLONOSCOPY N/A 3/3/2021    Procedure: COLONOSCOPY;  Surgeon: Chon" TJ Casper MD;  Location: [facility] ENDO (4TH FLR);  Service: Endoscopy;  Laterality: N/A;  Patient with his second poor bowel pre and has poor prep today.  If patient not intersted or can't get colonoscopy tomorrow will need constipation bowel prep and will need to restart his Eliquis today.Thanks,Chon     per Dr Lopez to hold Eliquis 2 days prior      COVID     CORONARY ANGIOGRAPHY N/A 2/28/2024    Procedure: ANGIOGRAM, CORONARY ARTERY;  Surgeon: Tylor Lincoln MD;  Location: [facility] CATH LAB;  Service: Cardiology;  Laterality: N/A;    CORONARY ANGIOPLASTY WITH STENT PLACEMENT  9/13/2006    FISTULOGRAM N/A 2/19/2024    Procedure: Fistulogram;  Surgeon: JUANI Melendez III, MD;  Location: [facility] CATH LAB;  Service: Peripheral Vascular;  Laterality: N/A;    IRRIGATION AND DEBRIDEMENT Right 8/30/2023    Procedure: IRRIGATION AND DEBRIDEMENT, RIGHT MIDDLE FINGER;  Surgeon: Martin Larsen MD;  Location: [facility] OR 2ND FLR;  Service: Orthopedics;  Laterality: Right;    KIDNEY TRANSPLANT  2005    LEFT HEART CATHETERIZATION N/A 2/26/2024    Procedure: Left heart cath;  Surgeon: Tylor Lincoln MD;  Location: [facility] CATH LAB;  Service: Cardiology;  Laterality: N/A;    PARATHYROIDECTOMY      PERCUTANEOUS TRANSLUMINAL ANGIOPLASTY OF ARTERIOVENOUS FISTULA Left 2/19/2024    Procedure: PTA, AV FISTULA;  Surgeon: JUANI Melendez III, MD;  Location: [facility] CATH LAB;  Service: Peripheral Vascular;  Laterality: Left;    STENT, DRUG ELUTING, SINGLE VESSEL, CORONARY N/A 2/28/2024    Procedure: Stent, Drug Eluting, Single Vessel, Coronary;  Surgeon: Tylor Lincoln MD;  Location: [facility] CATH LAB;  Service: Cardiology;  Laterality: N/A;    TREATMENT OF CARDIAC ARRHYTHMIA N/A 3/28/2022    Procedure: CARDIOVERSION;  Surgeon: Migue Blunt MD;  Location: [facility] EP LAB;  Service: Cardiology;  Laterality: N/A;  AF, DCC, MAC, GP, 3 PREP*RODRIGO deferred pt 100% compliant*       Review of patient's allergies indicates:   Allergen  Reactions    Ace inhibitors Swelling    Verapamil Other (See Comments)     Other reaction(s): Unknown    Povidone-iodine Itching       No current facility-administered medications on file prior to encounter.     Current Outpatient Medications on File Prior to Encounter   Medication Sig    acetaminophen (TYLENOL) 500 MG tablet Take 1 tablet (500 mg total) by mouth every 6 (six) hours as needed for Pain.    albuterol (VENTOLIN HFA) 90 mcg/actuation inhaler Inhale 2 puffs into the lungs every 6 (six) hours as needed for Wheezing or Shortness of Breath. Rescue    albuterol-ipratropium (DUO-NEB) 2.5 mg-0.5 mg/3 mL nebulizer solution Take 3 mLs by nebulization every 6 (six) hours as needed for Wheezing. Rescue. Can be used in place of albuterol inhaler.    amiodarone (PACERONE) 200 MG Tab Take 1 tablet (200 mg total) by mouth once daily.    apixaban (ELIQUIS) 5 mg Tab Take 1 tablet (5 mg total) by mouth 2 (two) times daily.    aspirin (ECOTRIN) 81 MG EC tablet Take 1 tablet (81 mg total) by mouth once daily.    atorvastatin (LIPITOR) 80 MG tablet Take 1 tablet (80 mg total) by mouth once daily.    b complex vitamins (B COMPLEX-VITAMIN B12) tablet Take 1 tablet by mouth once daily.    calcium carbonate (CALCIUM 600 ORAL) Take 1 tablet by mouth 2 (two) times a day.    CHOLECALCIFEROL, VITAMIN D3, ORAL Take 2 tablets by mouth 2 (two) times daily.    clopidogreL (PLAVIX) 75 mg tablet Take 1 tablet (75 mg total) by mouth once daily.    doxazosin (CARDURA) 4 MG tablet Take 1 tablet (4 mg total) by mouth every 12 (twelve) hours.    famotidine (PEPCID) 20 MG tablet Take 1 tablet (20 mg total) by mouth 2 (two) times daily.    fexofenadine HCl (ALLEGRA ORAL) Take 1 tablet by mouth daily as needed (allergies).    fluticasone furoate-vilanteroL (BREO) 100-25 mcg/dose diskus inhaler Inhale 1 puff into the lungs once daily. Controller. Use this inhaler every day.    fluticasone propionate (FLONASE) 50 mcg/actuation nasal spray 1 spray  (50 mcg total) by Each Nostril route daily as needed for Allergies.    FLUZONE HIGHDOSE QUAD 23-24  mcg/0.7 mL Syrg     furosemide (LASIX) 20 MG tablet Take 4 tablets (80 mg total) by mouth 2 (two) times daily as needed. For worsening LE swelling or shortness of breath on non-dialysis days    gabapentin (NEURONTIN) 100 MG capsule Take 1 capsule (100 mg total) by mouth as needed (pain).    hydrALAZINE (APRESOLINE) 50 MG tablet TAKE 1 TABLET (50 MG TOTAL) BY MOUTH 3 (THREE) TIMES DAILY.    HYDROcodone-acetaminophen (NORCO) 5-325 mg per tablet Take 1 tablet by mouth every 6 (six) hours as needed for Pain.    metoprolol succinate (TOPROL-XL) 25 MG 24 hr tablet Take 0.5 tablets (12.5 mg total) by mouth once daily.    MITIGARE 0.6 mg Cap TAKE 1 CAPSULE BY MOUTH TWICE A DAY AS NEEDED GOUT FLARE UP TO 3 DAYS    multivitamin (THERAGRAN) per tablet Take 1 tablet by mouth Daily.    NIFEdipine (PROCARDIA-XL) 60 MG (OSM) 24 hr tablet Take 1 tablet (60 mg total) by mouth 2 (two) times a day.    nitroGLYCERIN (NITROSTAT) 0.4 MG SL tablet Place 1 tablet (0.4 mg total) under the tongue every 5 (five) minutes as needed for Chest pain.    omeprazole (PRILOSEC) 20 MG capsule Take 1 capsule (20 mg total) by mouth daily as needed (heartburn).    paricalcitoL (ZEMPLAR) 1 MCG capsule TAKE 1 CAPSULE ON MONDAY, WEDNESDAY AND FRIDAY    METRIXWARE COVID BIVAL,12Y UP,,PF, 30 mcg/0.3 mL injection Inject into the muscle.    predniSONE (DELTASONE) 5 MG tablet Take 1 tablet (5 mg total) by mouth once daily.    pulse oximeter (PULSE OXIMETER) device by Apply Externally route 2 (two) times a day. Use twice daily at 8 AM and 3 PM and record the value in MyChart as directed.    sevelamer carbonate (RENVELA) 800 mg Tab Take 1 tablet (800 mg total) by mouth 3 (three) times daily with meals.    SPIKEVAX [id number],12Y UP,,PF, 50 mcg/0.5 mL injection     suvorexant (BELSOMRA) 5 mg Tab Take 5 mg by mouth nightly as needed (insomnia).    tacrolimus  (PROGRAF) 1 MG Cap Take 3 capsules (3 mg total) by mouth every 12 (twelve) hours for 60 days, THEN 2 capsules (2 mg total) every 12 (twelve) hours for 60 days, THEN 1 capsule (1 mg total) every 12 (twelve) hours for 60 days, THEN 1 capsule (1 mg total) once daily.    tiotropium bromide (SPIRIVA RESPIMAT) 1.25 mcg/actuation inhaler Inhale 2 puffs into the lungs once daily. Controller. Use this inhaler every day.     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     Patient is currently retired   Substance and Sexual Activity    Alcohol use: No    Drug use: No    Sexual activity: Yes     Partners: Female     Review of Systems   Constitutional: Negative for chills and fever.   Cardiovascular:  Negative for chest pain, orthopnea and palpitations.   Respiratory:  Negative for cough and shortness of breath.    Gastrointestinal:  Negative for abdominal pain.   Neurological:  Negative for dizziness, headaches and light-headedness.   Psychiatric/Behavioral:  Negative for altered mental status.      Objective:     Vital Signs (Most Recent):  Temp: 99.7 °F (37.6 °C) (04/10/24 0830)  Pulse: 70 (04/10/24 1000)  Resp: (!) 23 (04/10/24 1000)  BP: 92/64 (04/10/24 1000)  SpO2: 99 % (04/10/24 1000) Vital Signs (24h Range):  Temp:  [97.1 °F (36.2 °C)-99.7 °F (37.6 °C)] 99.7 °F (37.6 °C)  Pulse:  [63-98] 70  Resp:  [14-45] 23  SpO2:  [95 %-100 %] 99 %  BP: ()/(54-94)  92/64     Weight: 84.4 kg (186 lb)  Body mass index is 24.54 kg/m².    SpO2: 99 %         Intake/Output Summary (Last 24 hours) at 4/10/2024 1111  Last data filed at 4/10/2024 1000  Gross per 24 hour   Intake 512.84 ml   Output 3000 ml   Net -2487.16 ml       Lines/Drains/Airways       Peripheral Intravenous Line  Duration                  Hemodialysis AV Fistula Left upper arm -- days         Hemodialysis AV Fistula 02/19/24 0924 Left upper arm 51 days         Midline Catheter - Single Lumen 04/10/24 0030 Right basilic vein (medial side of arm) <1 day         Peripheral IV - Single Lumen 04/10/24 0230 20 G Right Antecubital <1 day                     Physical Exam  Vitals and nursing note reviewed.   Constitutional:       General: He is not in acute distress.     Appearance: Normal appearance. He is ill-appearing. He is not toxic-appearing.   HENT:      Head: Normocephalic and atraumatic.      Mouth/Throat:      Mouth: Mucous membranes are moist.   Cardiovascular:      Rate and Rhythm: Normal rate and regular rhythm.      Heart sounds: No murmur heard.     No friction rub. No gallop.   Pulmonary:      Effort: Pulmonary effort is normal. No respiratory distress.      Breath sounds: Normal breath sounds.   Abdominal:      Palpations: Abdomen is soft.   Musculoskeletal:      Right lower leg: No edema.      Left lower leg: No edema.      Comments: Left arm fistula, good thrill   Skin:     General: Skin is warm.   Neurological:      Mental Status: He is alert and oriented to person, place, and time. Mental status is at baseline.          Significant Labs: BMP:   Recent Labs   Lab 04/09/24  1157 04/09/24  2219 04/10/24  0605   GLU 92 119* 76    141 138   K 3.6 4.2 4.1    107 105   CO2 20* 20* 25   BUN 29* 31* 18   CREATININE 5.8* 5.9* 3.7*   CALCIUM 8.4* 8.3* 8.1*   MG  --  2.1 1.9     , CMP   Recent Labs   Lab 04/09/24  1157 04/09/24  2219 04/10/24  0605    141 138   K 3.6 4.2 4.1    107 105    CO2 20* 20* 25   GLU 92 119* 76   BUN 29* 31* 18   CREATININE 5.8* 5.9* 3.7*   CALCIUM 8.4* 8.3* 8.1*   PROT 6.8  --   --    ALBUMIN 3.0*  --   --    BILITOT 0.6  --   --    ALKPHOS 81  --   --    AST 16  --   --    ALT 17  --   --    ANIONGAP 14 14 8     , CBC   Recent Labs   Lab 04/09/24  2101 04/09/24  2214 04/10/24  0123 04/10/24  0605   WBC 7.00  --  11.54 9.38  9.38   HGB 7.1*  --  6.8* 7.7*  7.7*   HCT 22.8*   < > 22.2* 24.6*  24.6*     --  243 232  232    < > = values in this interval not displayed.     , INR   Recent Labs   Lab 04/09/24  2101   INR 1.3*   , Troponin   Recent Labs   Lab 04/10/24  0208 04/10/24  0605 04/10/24  1007   TROPONINI 4.492*  4.492* 4.125* 4.550*     , and All pertinent lab results from the last 24 hours have been reviewed.    Significant Imaging: Echocardiogram: Transthoracic echo (TTE) complete (Cupid Only):   Results for orders placed or performed during the hospital encounter of 02/23/24   Echo   Result Value Ref Range    BSA 2.13 m2    LVOT stroke volume 85.85 cm3    LVIDd 5.37 3.5 - 6.0 cm    LV Systolic Volume 66.14 mL    LV Systolic Volume Index 31.2 mL/m2    LVIDs 3.91 2.1 - 4.0 cm    LV Diastolic Volume 139.45 mL    LV Diastolic Volume Index 65.78 mL/m2    IVS 1.0 0.6 - 1.1 cm    LVOT diameter 2.1 cm    LVOT area 3.5 cm2    FS 27 (A) 28 - 44 %    Left Ventricle Relative Wall Thickness 0.37 cm    Posterior Wall 1.0 0.6 - 1.1 cm    LV mass 204.82 g    LV Mass Index 97 g/m2    MV Peak E Aris 1.25 m/s    TDI LATERAL 0.09 m/s    TDI SEPTAL 0.07 m/s    E/E' ratio 15.63 m/s    MV Peak A Aris 0.68 m/s    TR Max Aris 3.21 m/s    E/A ratio 1.84     E wave deceleration time 240.56 msec    LV SEPTAL E/E' RATIO 17.86 m/s    LA Volume Index 49.8 mL/m2    LV LATERAL E/E' RATIO 13.89 m/s    LA volume 105.57 cm3    LVOT peak aris 1.05 m/s    RVDD 4.21 cm    TAPSE 2.53 cm    LA size 4.65 cm    Left Atrium Minor Axis 6.18 cm    Left Atrium Major Axis 5.26 cm    LA volume (mod) 95.00  cm3    LA WIDTH 4.70 cm    LA Volume Index (Mod) 44.8 mL/m2    RA Major Axis 5.76 cm    RA Width 4.82 cm    AV mean gradient 14 mmHg    AV peak gradient 27 mmHg    Ao peak huan 2.62 m/s    Ao VTI 52.95 cm    LVOT peak VTI 24.80 cm    AV valve area 1.62 cm²    AV Velocity Ratio 0.40     AV index (prosthetic) 0.47     VERONICA by Velocity Ratio 1.39 cm²    MV stenosis pressure 1/2 time 69.76 ms    MV valve area p 1/2 method 3.15 cm2    Triscuspid Valve Regurgitation Peak Gradient 41 mmHg    PV PEAK VELOCITY 3.31 m/s    PV peak gradient 44 mmHg    Sinus 3.61 cm    STJ 2.64 cm    Ascending aorta 2.80 cm    Mean e' 0.08 m/s    ZLVIDS -0.35     ZLVIDD -2.21     TV resting pulmonary artery pressure 49 mmHg    RV TB RVSP 11 mmHg    Est. RA pres 8 mmHg    RA area 22.0 cm2    Narrative      Left Ventricle: There is normal systolic function with a visually   estimated ejection fraction of 60 - 65%. Grade II diastolic dysfunction.   Elevated left ventricular filling pressure.    Right Ventricle: Normal right ventricular cavity size. Wall thickness   is normal. Right ventricle wall motion  is normal. Systolic function is   normal.    Left Atrium: Left atrium is moderately dilated.    Right Atrium: Right atrium is moderately dilated.    Aortic Valve: The aortic valve is a trileaflet valve. There is moderate   aortic valve sclerosis. There is moderate annular calcification present.   There is mild to moderate aortic regurgitation. There appers to be mobile   echodensoty attached to the base of the non coronary leaflet that is   likely nodular calcificaiton and is tthe site of origin of Aortic   regurgitation. This appears unchanged from pror TTE.    Aortic Valve: Mildly restricted motion. There is mild stenosis. Aortic   valve area by VTI is 1.62 cm². Aortic valve peak velocity is 2.62 m/s.   Mean gradient is 14 mmHg. The dimensionless index is 0.47.    Mitral Valve: There is moderate mitral annular calcification present.   There is  mild to moderate regurgitation.    Aorta: Aortic root is normal in size measuring 3.61 cm. Ascending aorta   is normal measuring 2.80 cm.    Pulmonary Artery: The estimated pulmonary artery systolic pressure is   49 mmHg.    IVC/SVC: Intermediate venous pressure at 8 mmHg.       Assessment and Plan:     * Chest pain  Patient is a 67 yo male with a PMHx of paroxysmal atrial fibrillation on amiodarone 200mg qd and apixaban 5mg bid, tachy-loan syndrome, HFpEF with most recent LVEF 65%, CAD s/p PCI 2006 on plavix, HTN, HLD, aortic atherosclerosis, secondary hyperparathyroidism, diverticulosis, COPD, GERD, hypothyroidism, gout, a history of tobacco use, obesity, ESRD s/p failed kidney transplant x2 (1992, 2005) on chronic immunosuppressive medication now on hemodialysis who was admitted to [facility] on 04/09/2024 with excrutiating chest pain. ECG with NSR with inferolateral ST depressions and non-specific changes. Patient underwent coronary angiography 02/2024 and was found to have multivessel CAD. CTS had declined patient for CABG and patient under PCI to the RCA on 02/28/2024. Mid LAD 70% + OM1/OM2 70% obstruction remained medically managed as thought was that culprit vessel (RCA) was addressed.     Trop on admission 0.035 --> 0.4 --> 1.9.    - trops trended to peak, now flat  - s/p kidney transplant, he does make urine  - 80 lasix once today, HD tomorrow, continue to monitor volume status and urine output.  - F/u formal TTE  - Hold apixaban for now while starting ACS protocol  - Continue ACS protocol, will discuss duration of heparin drip  - Isosorbide mononitrate daily for anti angina  - STAT ECG if patient has chest discomfort and call Cardiology   - Monitor telemetry    Prophylactic immunotherapy  Failed kidney transplant x 2  On tacro + pred per transplant nephrology    Paroxysmal atrial fibrillation  Continue heparin gtt for now  Restart home Eliquis after ACS complete and no further invasive procedures  planned    Long term (current) use of anticoagulants [V58.61]  Change Eliquis (afib) to heparin for Acs for now    Mixed hyperlipidemia  Continue high intensity statin          VTE Risk Mitigation (From admission, onward)           Ordered     heparin 25,000 units in dextrose 5% (100 units/ml) IV bolus from bag LOW INTENSITY nomogram - [facility]  As needed (PRN)        Question:  Heparin Infusion Adjustment (DO NOT MODIFY ANSWER)  Answer:  [email or web address]\epic\Images\Pharmacy\HeparinInfusions\heparin LOW INTENSITY nomogram for [facility] [id number]    04/09/24 1853     heparin 25,000 units in dextrose 5% (100 units/ml) IV bolus from bag LOW INTENSITY nomogram - [facility]  As needed (PRN)        Question:  Heparin Infusion Adjustment (DO NOT MODIFY ANSWER)  Answer:  [email or web address]\epic\Images\Pharmacy\HeparinInfusions\heparin LOW INTENSITY nomogram for [facility] [id number]    04/09/24 1853     heparin 25,000 units in dextrose 5% 250 mL (100 units/mL) infusion LOW INTENSITY nomogram - [facility]  Continuous        Question:  Begin at (units/kg/hr)  Answer:  12    04/09/24 1853     IP VTE HIGH RISK PATIENT  Once         04/09/24 1536     Place sequential compression device  Until discontinued         04/09/24 1536                    Thank you for your consult. I will follow-up with patient. Please contact us if you have any additional questions.    Jose Mathew MD  Cardiology   [facility] - Cardiac Medical ICU

## 2024-04-10 NOTE — ASSESSMENT & PLAN NOTE
Patient with history of COPD and ESRD stepping up to MICU for acute SOB, concern for Flash pulmonary edema  CXR with worsening edema  BNP increased to 3000 from 2000  Patient notably tachypneic on exam  Oxygen reqs increased to 6 L NC from RA  Unable to tolerate re-breather    -Bipap 148  -Precedex for anxiety with positive pressure support  -Lasix IV+infusion (patient not anuric), pending dialysis for volume control  -nephrology consulted

## 2024-04-10 NOTE — ASSESSMENT & PLAN NOTE
Chronic immunosuppression, prophylactic immunotherapy    - Continue home prednisone and tacro taper

## 2024-04-10 NOTE — PROGRESS NOTES
04/10/24 0245   Vital Signs   Pulse 69   Resp (!) 60   SpO2 100 %   BP (!) 180/73   MAP (mmHg) 105   During Hemodialysis Assessment   Blood Flow Rate (mL/min) 250 mL/min   Dialysate Flow Rate (mL/min) 500 ml/min   Ultrafiltration Rate (mL/Hr) 430 mL/Hr   Arteriovenous Lines Secure Yes   Arterial Pressure (mmHg) -60 mmHg   Venous Pressure (mmHg) 120   Blood Volume Processed (Liters) 14.5 L   UF Removed (mL) 1125 mL   TMP 60   Venous Line in Air Detector Yes   Intra-Hemodialysis Comments UF decreased as ordered due to patient stated to ICU RN that he feels some pressure on his chest. Patient resting after UF decreased with no complaints of chest  pain.

## 2024-04-10 NOTE — ASSESSMENT & PLAN NOTE
Continue heparin gtt for now  Restart home Eliquis after ACS complete and no further invasive procedures planned

## 2024-04-10 NOTE — PLAN OF CARE
MICU DAILY GOALS     Family/Goals of care/Code Status   Code Status: Full Code    24H Vital Sign Range  Temp:  [97.1 °F (36.2 °C)-98.3 °F (36.8 °C)]   Pulse:  []   Resp:  [14-45]   BP: (107-205)/(56-94)   SpO2:  [95 %-100 %]      Shift Events (include procedures and significant events)  Admitted as a rapid response to MICU from CSU. O2 weaned to room air following admission. HD x3 hrs completed. 1 U PRBCs transfused.  Gtts: Heparin    AWAKE RASS: Goal - RASS Goal: 0-->alert and calm  Actual - RASS (Gant Agitation-Sedation Scale): alert and calm    Restraint necessity: Not necessary   BREATHE SBT: Not intubated    Coordinate A & B, analgesics/sedatives Pain: managed   SAT: Not intubated   Delirium CAM-ICU: Overall CAM-ICU: Negative   Early(intubated/ Progressive (non-intubated) Mobility MOVE Screen (INTUBATED ONLY): Not intubated    Activity: Activity Management: Rolling - L1, Arm raise - L1   Feeding/Nutrition Diet order: Diet/Nutrition Received: NPO,     Thrombus DVT prophylaxis: VTE Required Core Measure: Pharmacological prophylaxis initiated/maintained   HOB Elevation Head of Bed (HOB) Positioning: HOB at 30-45 degrees   Ulcer Prophylaxis GI: yes   Glucose control managed     Skin Skin assessed during: Admit    Sacrum intact/not altered? Yes  Heels intact/not altered? Yes  Surgical wound? No    CHECK ONE!   (no altered skin or altered skin) and sub boxes:  [x] No Altered Skin Integrity Present    [x]Prevention Measures Documented    [] Altered Skin Integrity Present or Discovered   [] LDA present in EPIC, daily doc completed              [] LDA added if not in EPIC (describe wound).                    When describing wound, do not stage, use descriptive words only.    [] Wound Image Taken (required on admit,                   transfer/discharge and every Tuesday)    Wound Care Consulted? No    4 EYES:  Attending Nurse (1st set of eyes): Andrea Croft RN    Second RN/Staff Member (2nd set of eyes):  Amanda Marti, RN   Bowel Function no issues    Indwelling Catheter Necessity            De-escalation Antibiotics Yes        VS and assessment per flow sheet, patient progressing towards goals as tolerated, plan of care reviewed with [unfilled], all concerns addressed, will continue to monitor.

## 2024-04-10 NOTE — SUBJECTIVE & OBJECTIVE
Interval History:   Overnight, patient had rapid response for respiratory distress and flash pulmonary edema.  He was stepped up to ICU and placed on BiPAP.  Nephrology was notified overnight and he underwent emergent HD with 2L net fluid removal.  SOB has since resolved, oxygenating well on RA at time of evaluation.  He denies any CP.  Formal ECHO pending, Troponin 4.5 this morning.    Review of patient's allergies indicates:   Allergen Reactions    Ace inhibitors Swelling    Verapamil Other (See Comments)     Other reaction(s): Unknown    Povidone-iodine Itching     Current Facility-Administered Medications   Medication Frequency    0.9%  NaCl infusion (for blood administration) Q24H PRN    acetaminophen tablet 650 mg Q4H PRN    albuterol-ipratropium 2.5 mg-0.5 mg/3 mL nebulizer solution 3 mL Q4H PRN    aluminum-magnesium hydroxide-simethicone 200-200-20 mg/5 mL suspension 30 mL QID PRN    amiodarone tablet 200 mg Daily    aspirin chewable tablet 81 mg Daily    atorvastatin tablet 80 mg Daily    bisacodyL suppository 10 mg Daily PRN    clopidogreL tablet 75 mg Daily    dextrose 10% bolus 125 mL 125 mL PRN    dextrose 10% bolus 250 mL 250 mL PRN    doxazosin tablet 4 mg Q12H    fluticasone furoate-vilanteroL 100-25 mcg/dose diskus inhaler 1 puff Daily    fluticasone propionate 50 mcg/actuation nasal spray 50 mcg Daily    gabapentin capsule 100 mg PRN    glucagon (human recombinant) injection 1 mg PRN    glucose chewable tablet 16 g PRN    glucose chewable tablet 24 g PRN    heparin 25,000 units in dextrose 5% (100 units/ml) IV bolus from bag LOW INTENSITY nomogram - [facility] PRN    heparin 25,000 units in dextrose 5% (100 units/ml) IV bolus from bag LOW INTENSITY nomogram - [facility] PRN    heparin 25,000 units in dextrose 5% 250 mL (100 units/mL) infusion LOW INTENSITY nomogram - [facility] Continuous    hydrALAZINE tablet 50 mg TID    HYDROcodone-acetaminophen  mg per tablet 1 tablet Q6H PRN    HYDROcodone-acetaminophen  5-325 mg per tablet 1 tablet Q6H PRN    isosorbide mononitrate 24 hr tablet 30 mg Daily    magnesium sulfate 2g in water 50mL IVPB (premix) Once    melatonin tablet 6 mg Nightly PRN    metoprolol succinate (TOPROL-XL) 24 hr split tablet 12.5 mg Daily    multivitamin tablet Daily    mupirocin 2 % ointment BID    naloxone 0.4 mg/mL injection 0.02 mg PRN    niCARdipine 40 mg/200 mL (0.2 mg/mL) infusion Continuous    NIFEdipine 24 hr tablet 60 mg BID    nitroGLYCERIN SL tablet 0.4 mg Q5 Min PRN    ondansetron disintegrating tablet 8 mg Q8H PRN    pantoprazole EC tablet 40 mg Daily    paricalcitoL capsule 1 mcg Every Mon, Wed, Fri    polyethylene glycol packet 17 g BID PRN    predniSONE tablet 5 mg Daily    prochlorperazine injection Soln 5 mg Q6H PRN    simethicone chewable tablet 80 mg QID PRN    sodium chloride 0.9% flush 10 mL Q12H PRN    tacrolimus capsule 3 mg BID    tiotropium bromide 1.25 mcg/actuation inhaler 2 puff Daily    vitamin D 1000 units tablet 1,000 Units BID       Objective:     Vital Signs (Most Recent):  Temp: 98.2 °F (36.8 °C) (04/10/24 1200)  Pulse: 72 (04/10/24 1305)  Resp: (!) 27 (04/10/24 1305)  BP: (!) 153/63 (04/10/24 1305)  SpO2: 98 % (04/10/24 1305) Vital Signs (24h Range):  Temp:  [97.1 °F (36.2 °C)-99.7 °F (37.6 °C)] 98.2 °F (36.8 °C)  Pulse:  [64-98] 72  Resp:  [14-45] 27  SpO2:  [95 %-100 %] 98 %  BP: ()/(54-94) 153/63     Weight: 84.4 kg (186 lb) (04/09/24 1642)  Body mass index is 24.54 kg/m².  Body surface area is 2.08 meters squared.    I/O last 3 completed shifts:  In: 483.4 [I.V.:93.4; Blood:350; IV Piggyback:40]  Out: 2800 [Other:2800]     Physical Exam  Vitals and nursing note reviewed.   Constitutional:       General: He is not in acute distress.     Appearance: Normal appearance. He is not ill-appearing or toxic-appearing.   HENT:      Head: Normocephalic and atraumatic.      Nose: No congestion or rhinorrhea.      Mouth/Throat:      Mouth: Mucous membranes are moist.       Pharynx: No oropharyngeal exudate or posterior oropharyngeal erythema.   Eyes:      General: No scleral icterus.        Right eye: No discharge.         Left eye: No discharge.      Extraocular Movements: Extraocular movements intact.      Conjunctiva/sclera: Conjunctivae normal.   Cardiovascular:      Rate and Rhythm: Normal rate. Rhythm irregular.      Heart sounds: No murmur heard.     No friction rub. No gallop.      Comments: MIRA AVF ++  Pulmonary:      Effort: Pulmonary effort is normal. No respiratory distress.      Breath sounds: No wheezing or rales.   Abdominal:      General: Bowel sounds are normal. There is no distension.      Palpations: Abdomen is soft.      Tenderness: There is no abdominal tenderness.   Musculoskeletal:         General: No swelling.      Cervical back: Normal range of motion and neck supple.      Right lower leg: No edema.      Left lower leg: No edema.   Skin:     General: Skin is warm and dry.   Neurological:      General: No focal deficit present.      Mental Status: He is alert and oriented to person, place, and time.          Significant Labs:  CBC:   Recent Labs   Lab 04/10/24  0605   WBC 9.38  9.38   RBC 3.02*  3.02*   HGB 7.7*  7.7*   HCT 24.6*  24.6*     232   MCV 82  82   MCH 25.5*  25.5*   MCHC 31.3*  31.3*     CMP:   Recent Labs   Lab 04/09/24  1157 04/09/24  2219 04/10/24  0605   GLU 92   < > 76   CALCIUM 8.4*   < > 8.1*   ALBUMIN 3.0*  --   --    PROT 6.8  --   --       < > 138   K 3.6   < > 4.1   CO2 20*   < > 25      < > 105   BUN 29*   < > 18   CREATININE 5.8*   < > 3.7*   ALKPHOS 81  --   --    ALT 17  --   --    AST 16  --   --    BILITOT 0.6  --   --     < > = values in this interval not displayed.

## 2024-04-10 NOTE — ASSESSMENT & PLAN NOTE
BP on evaluation 190/70    -continue home nifedipine, hydralazine  -initiated nicardipine infusion, goal BP <180 systolic

## 2024-04-10 NOTE — PROGRESS NOTES
04/10/24 0345   Vital Signs   Pulse 69   Resp (!) 62   SpO2 100 %   BP (!) 183/74   During Hemodialysis Assessment   Blood Flow Rate (mL/min) 250 mL/min   Dialysate Flow Rate (mL/min) 500 ml/min   Ultrafiltration Rate (mL/Hr) 940 mL/Hr   Arteriovenous Lines Secure Yes   Arterial Pressure (mmHg) -70 mmHg   Venous Pressure (mmHg) 120   UF Removed (mL) 1828 mL   TMP 70   Venous Line in Air Detector Yes   Intra-Hemodialysis Comments Tolerating HD and PRBC well with no reactions noted.

## 2024-04-10 NOTE — H&P
[facility] - Cardiac Medical ICU  Critical Care Medicine  History & Physical    Patient Name: Ibrahima Phelps  MRN: [id number]  Admission Date: 4/9/2024  Hospital Length of Stay: 0 days  Code Status: Full Code  Attending Physician: Temi Traylor MD   Primary Care Provider: Anatoliy Colindres MD   Principal Problem: Chest pain    Subjective:     HPI:  Mr. Phelps is a 68 year old male with extensive PMH notable for pAF on amiodarone 200mg qd and apixaban 5mg bid, tachy-loan syndrome, HFpEF with most recent LVEF 65%, CAD s/p PCI 2006 on plavix, HTN, HLD, aortic atherosclerosis, secondary hyperparathyroidism, diverticulosis, COPD, GERD, hypothyroidism, gout, a history of tobacco use, obesity, ESRD s/p failed kidney transplant x2 (1992, 2005) on chronic immunosuppressive medication now on hemodialysis who presented to [facility] ED with chest pain. Per chart review, EMS activated and initial rhythm AF. Patient endorsed 2.5 hours of chest discomfort, with associated diaphoresis. Denies radiation. Of note, patient recently underwent coronary angiography in Feb 2024 and was found to have multivessel CAD. CTS evaluated and had declined patient for CABG and patient under PCI to the RCA on 02/28/2024. Mid LAD 70% + OM1/OM2 70% obstruction.     In the emergency department: ECG with inferolateral ST depressions. Initial troponin 0.035 --> 0.4 --> 1.9. BNP > 3000. BUN/Cr 29/5.8. WBC 6. Stable H/H at 8.2/27. Admitted to hospital medicine with cardiology consulted. He was started on ACS protocol at the recommendation of cardiology.     Overnight 04/09 rapid response activated for respiratory distress and flash pulmonary edema. Wife at bedside states he became acutely short of breath. Pt tripoding at bedside, satting in the 90s on nasal cannula, BP with systolic 170s. Pt does produce some urine still at baseline. Last dialysis was 3 days prior. History limited 2/2 patient respiratory distress. Patient transferred to the MICU for NIPPV and  "iHD.     Hospital/ICU Course:  No notes on file     Past Medical History:   Diagnosis Date    Atrial fibrillation     CAD (coronary artery disease) 2006    MI in 2006    CHF (congestive heart failure) 2017    CKD (chronic kidney disease) stage 3, GFR 30-59 ml/min     Dr. Latif.  in transplanted kidney    CKD (chronic kidney disease) stage 5, GFR less than 15 ml/min 02/02/2024    COVID-19 02/07/2023    Diverticulosis     Hyperlipidemia     Hypertension     Keloid cicatrix     Metabolic bone disease     Other emphysema 10/01/2019    Pericarditis     S/P kidney transplant 1992    x2 (1992 & 2005),    Thrombocytopenia     Thyroid disease     Tobacco use 09/05/2014       Past Surgical History:   Procedure Laterality Date    AV FISTULA PLACEMENT Left 12/18/2023    Procedure: CREATION, AV FISTULA;  Surgeon: JUANI Melendez III, MD;  Location: [facility] OR 2ND FLR;  Service: Vascular;  Laterality: Left;  LUE AVF creation vs AVG insertion    CARDIOVERSION  04/30/15    CHOLECYSTECTOMY      COLONOSCOPY  April 20, 2011    Diverticulosis, repeat recommended in 3 yrs., repeat colonoscopy 2014 revealed 2 polyps.  He should return in 5 years.    COLONOSCOPY N/A 3/13/2020    Procedure: COLONOSCOPY;  Surgeon: Chon Casper MD;  Location: [facility] (4TH FLR);  Service: Endoscopy;  Laterality: N/A;  ok to hold coumadin x5days-see telephone encounter 2/4/20-tb    COLONOSCOPY N/A 2/4/2021    Procedure: COLONOSCOPY;  Surgeon: Chon Casper MD;  Location: [facility] (4TH FLR);  Service: Endoscopy;  Laterality: N/A;  Eliquis - per Dr. GAVIN Blunt ok to hold x 2 days and "restarted asap"- ERW  last prep "poor", [id number] ordered/ Pt requests Dr. Casper  prep ins. mailed - COVID screening on 2/1/21 PCW- ERW    COLONOSCOPY N/A 3/3/2021    Procedure: COLONOSCOPY;  Surgeon: Chon Casper MD;  Location: [facility] ENDO (4TH FLR);  Service: Endoscopy;  Laterality: N/A;  Patient with his second poor bowel pre and has poor prep today.  If patient " not intersted or can't get colonoscopy tomorrow will need constipation bowel prep and will need to restart his Eliquis today.Thanks,Chon     per Dr Lopez to hold Eliquis 2 days prior      COVID     CORONARY ANGIOGRAPHY N/A 2/28/2024    Procedure: ANGIOGRAM, CORONARY ARTERY;  Surgeon: Tylor Lincoln MD;  Location: [facility] CATH LAB;  Service: Cardiology;  Laterality: N/A;    CORONARY ANGIOPLASTY WITH STENT PLACEMENT  9/13/2006    FISTULOGRAM N/A 2/19/2024    Procedure: Fistulogram;  Surgeon: JUANI Melendez III, MD;  Location: [facility] CATH LAB;  Service: Peripheral Vascular;  Laterality: N/A;    IRRIGATION AND DEBRIDEMENT Right 8/30/2023    Procedure: IRRIGATION AND DEBRIDEMENT, RIGHT MIDDLE FINGER;  Surgeon: Martin Larsen MD;  [facility];  Service: Orthopedics;  Laterality: Right;    KIDNEY TRANSPLANT  2005    LEFT HEART CATHETERIZATION N/A 2/26/2024    Procedure: Left heart cath;  Surgeon: Tylor Lincoln MD;  Location: [facility] CATH LAB;  Service: Cardiology;  Laterality: N/A;    PARATHYROIDECTOMY      PERCUTANEOUS TRANSLUMINAL ANGIOPLASTY OF ARTERIOVENOUS FISTULA Left 2/19/2024    Procedure: PTA, AV FISTULA;  Surgeon: JUANI Melendez III, MD;  Location: [facility] CATH LAB;  Service: Peripheral Vascular;  Laterality: Left;    STENT, DRUG ELUTING, SINGLE VESSEL, CORONARY N/A 2/28/2024    Procedure: Stent, Drug Eluting, Single Vessel, Coronary;  Surgeon: Tylor Lincoln MD;  Location: [facility] CATH LAB;  Service: Cardiology;  Laterality: N/A;    TREATMENT OF CARDIAC ARRHYTHMIA N/A 3/28/2022    Procedure: CARDIOVERSION;  Surgeon: Migue Blunt MD;  Location: [facility] EP LAB;  Service: Cardiology;  Laterality: N/A;  AF, DCC, MAC, GP, 3 PREP*RODRIGO deferred pt 100% compliant*       Review of patient's allergies indicates:   Allergen Reactions    Ace inhibitors Swelling    Verapamil Other (See Comments)     Other reaction(s): Unknown    Povidone-iodine Itching       Family History       Problem Relation  (Age of Onset)    Heart disease Father    Kidney disease Sister, Brother    Kidney failure Sister, Brother    No Known Problems Sister, Brother, Sister, Sister    Thyroid disease Mother          Tobacco Use    Smoking status: Every Day     Current packs/day: 0.00     Average packs/day: 0.5 packs/day for 40.0 years (20.0 ttl pk-yrs)     Types: Cigarettes     Start date: 1982     Last attempt to quit: 2022     Years since quittin.1    Smokeless tobacco: Never    Tobacco comments:     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     Patient is currently retired   Substance and Sexual Activity    Alcohol use: No    Drug use: No    Sexual activity: Yes     Partners: Female      Review of Systems   Constitutional:  Positive for diaphoresis. Negative for activity change, appetite change, chills and fever.   HENT:  Negative for congestion and sore throat.    Respiratory:  Positive for shortness of breath. Negative for cough and wheezing.    Cardiovascular:  Positive for chest pain.   Neurological:  Negative for dizziness, light-headedness and headaches.     Objective:     Vital Signs (Most Recent):  Temp: 98.3 °F (36.8 °C) (24)  Pulse: 69 (24)  Resp: 20 (24)  BP: (!) 152/72 (24)  SpO2: 98 % (24) Vital Signs (24h Range):  Temp:  [97.1 °F (36.2 °C)-98.3 °F (36.8 °C)] 98.3 °F (36.8 °C)  Pulse:  [] 69  Resp:  [14-22] 20  SpO2:  [95 %-100 %] 98 %  BP: (107-163)/(56-80) 152/72   Weight: 84.4 kg (186 lb)  Body mass index is 24.54 kg/m².    No intake or output data in the 24 hours ending 24       Physical Exam  Vitals and nursing note reviewed.   Constitutional:       General: He is in acute distress.      Appearance: Normal appearance. He is normal weight. He is ill-appearing. He is not toxic-appearing.   HENT:      Head: Normocephalic and atraumatic.      Mouth/Throat:      Mouth: Mucous membranes are moist.    Eyes:      General: No scleral icterus.        Right eye: No discharge.         Left eye: No discharge.      Conjunctiva/sclera: Conjunctivae normal.   Cardiovascular:      Rate and Rhythm: Regular rhythm. Tachycardia present.      Heart sounds: No murmur heard.  Pulmonary:      Effort: Respiratory distress present.      Breath sounds: Rales present. No wheezing.   Abdominal:      General: Abdomen is flat. There is no distension.   Musculoskeletal:      Right lower leg: No edema.      Left lower leg: No edema.   Skin:     General: Skin is warm and dry.      Coloration: Skin is not pale.      Findings: No erythema.   Neurological:      Mental Status: He is alert and oriented to person, place, and time.   Psychiatric:         Mood and Affect: Mood normal.         Behavior: Behavior normal.            Vents:     Lines/Drains/Airways       Peripheral Intravenous Line  Duration                  Hemodialysis AV Fistula Left upper arm -- days         Hemodialysis AV Fistula 02/19/24 0924 Left upper arm 50 days         Peripheral IV - Single Lumen 04/09/24 1133 20 G Posterior;Right Forearm <1 day                  Significant Labs:    CBC/Anemia Profile:  Recent Labs   Lab 04/09/24  1157 04/09/24  2101 04/09/24  2214   WBC 6.22 7.00  --    HGB 8.2* 7.1*  --    HCT 27.2* 22.8* 27*    255  --    MCV 82 82  --    RDW 17.4* 17.7*  --         Chemistries:  Recent Labs   Lab 04/09/24  1157      K 3.6      CO2 20*   BUN 29*   CREATININE 5.8*   CALCIUM 8.4*   ALBUMIN 3.0*   PROT 6.8   BILITOT 0.6   ALKPHOS 81   ALT 17   AST 16       All pertinent labs within the past 24 hours have been reviewed.    Significant Imaging: I have reviewed all pertinent imaging results/findings within the past 24 hours.  Assessment/Plan:     Pulmonary  Flash pulmonary edema  See AHRF    Acute hypoxemic respiratory failure  Patient with history of COPD and ESRD stepping up to MICU for acute SOB, concern for Flash pulmonary edema  CXR  with worsening edema  BNP increased to 3000 from 2000  Patient notably tachypneic on exam  Oxygen reqs increased to 6 L NC from RA  Unable to tolerate re-breather    -Bipap 148  -Precedex for anxiety with positive pressure support  -Lasix IV+infusion (patient not anuric), pending dialysis for volume control  -nephrology consulted    Cardiac/Vascular  * Chest pain  See CAD    Atrial fibrillation with RVR  -Holding eliquis while on ACS protocol  -Continue home amiodarone, metoprolol  -plan for urgent dialysis    Chronic heart failure with preserved ejection fraction  Patient is identified as having Diastolic (HFpEF) heart failure that is Chronic.    Echo 2/25/24    Left Ventricle: There is normal systolic function with a visually estimated ejection fraction of 60 - 65%. Grade II diastolic dysfunction. Elevated left ventricular filling pressure.    Right Ventricle: Normal right ventricular cavity size. Wall thickness is normal. Right ventricle wall motion  is normal. Systolic function is normal.    Left Atrium: Left atrium is moderately dilated.    Right Atrium: Right atrium is moderately dilated.    Aortic Valve: The aortic valve is a trileaflet valve. There is moderate aortic valve sclerosis. There is moderate annular calcification present. There is mild to moderate aortic regurgitation. There appers to be mobile echodensoty attached to the base of the non coronary leaflet that is likely nodular calcificaiton and is tthe site of origin of Aortic regurgitation. This appears unchanged from pror TTE. AV leaflets appears thickened in some views cannot exlude presence of endocarditis (clip # 5).    Aortic Valve: Mildly restricted motion. There is mild stenosis. Aortic valve area by VTI is 1.62 cm². Aortic valve peak velocity is 2.62 m/s. Mean gradient is 14 mmHg. The dimensionless index is 0.47.    Mitral Valve: There is moderate mitral annular calcification present. There is mild to moderate regurgitation.    Aorta:  Aortic root is normal in size measuring 3.61 cm. Ascending aorta is normal measuring 2.80 cm.    Pulmonary Artery: The estimated pulmonary artery systolic pressure is 49 mmHg.    IVC/SVC: Intermediate venous pressure at 8 mmHg.    BNP 3075 (2000)    - Continue metoprolol  - Monitor on telemetry.  - Monitor strict Is&Os and daily weights. Place on fluid restriction of 1.5 L.   - Continue to stress to patient importance of self efficacy and  on diet for CHF.  - Volume management with PRN lasix and HD    Paroxysmal atrial fibrillation  -Continue heparin gtt per ACS protocol  -Holding eliquis will resume after ACS complete    Mixed hyperlipidemia  Continue high intensity statin    CAD (coronary artery disease)  Patient underwent coronary angiography 02/2024 and was found to have multivessel CAD. CTS had declined patient for CABG and patient under PCI to the RCA on 02/28/2024. Mid LAD 70% + OM1/OM2 70% obstruction remained medically managed as thought was that culprit vessel (RCA) was addressed.   Trop on admission 0.035 --> 0.4 --> 1.9-->3.78  Cardiology consulted, likely type II demand    - Trend trop to peak  - follow up TTE, ordered  - continue ACS protocol  - continue high intensity statin  - continue beta blocker  - NPO after midnight for possible repeat coronary angiogram per Cardiology  - NTG SL 0.4  q 5 min PRN for chest discomfort  - Cardiac telemetry    Primary hypertension  BP on evaluation 190/70    -continue home nifedipine, hydralazine  -initiated nicardipine infusion, goal BP <180 systolic    Renal/  ESRD (end stage renal disease) on dialysis  TThS schedule  Duration: 3.5 hrs  Access: LUE   Residual Renal Function: minimal  Last HD prior to admission: 4/6    - Nephrology consulted  - Will dialyze for flash pulmonary edema  - metabolic profile daily    H/O kidney transplant  Chronic immunosuppression, prophylactic immunotherapy    - Continue home prednisone and tacro taper    Immunology/Multi  System  Prophylactic immunotherapy  Failed kidney transplant x 2  On tacro + pred per transplant nephrology    GI  GERD (gastroesophageal reflux disease)  -continue protonix        Critical Care Daily Checklist:    A: Awake: RASS Goal/Actual Goal:    Actual:     B: Spontaneous Breathing Trial Performed?     C: SAT & SBT Coordinated?  N/a                      D: Delirium: CAM-ICU     E: Early Mobility Performed? Yes   F: Feeding Goal:    Status:     Current Diet Order   Procedures    Diet NPO      AS: Analgesia/Sedation Precedex   T: Thromboembolic Prophylaxis Heparin gtt   H: HOB > 300 Yes   U: Stress Ulcer Prophylaxis (if needed) Yes   G: Glucose Control Yes   B: Bowel Function     I: Indwelling Catheter (Lines & Hamilton) Necessity Midline   D: De-escalation of Antimicrobials/Pharmacotherapies N/a    Plan for the day/ETD Emergent dialysis    Code Status:  Family/Goals of Care: Full Code         Critical secondary to Patient has a condition that poses threat to life and bodily function: Severe Respiratory Distress    Critical care was time spent personally by me on the following activities: development of treatment plan with patient or surrogate and bedside caregivers, discussions with consultants, evaluation of patient's response to treatment, examination of patient, ordering and performing treatments and interventions, ordering and review of laboratory studies, ordering and review of radiographic studies, pulse oximetry, re-evaluation of patient's condition. This critical care time did not overlap with that of any other provider or involve time for any procedures.     Alessandra Galvan,   Critical Care Medicine  [facility] - Cardiac Medical ICU

## 2024-04-10 NOTE — PLAN OF CARE
Inpatient Upgrade Note    Ibrahima Phelps has warranted treatment spanning two or more midnights of hospital level care for the management of chest pain. He continues to require daily labs, monitoring of vital signs, further evaluation by consultants, and IV Anticoagulation  . His condition is also complicated by the following comorbidities: Coronary Artery Disease, Hypertension, Chronic kidney disease, and Heart failure.

## 2024-04-10 NOTE — RESIDENT HANDOFF
Handoff     Primary Team: [facility] CRITICAL CARE MEDICINE Room Number: 6092/6092 A     Patient Name: Ibrahima Phelps MRN: [id number]     Date of Birth: [id number] Allergies: Ace inhibitors, Verapamil, and Povidone-iodine     Age: 68 y.o. Admit Date: 4/9/2024     Sex: male  BMI: Body mass index is 24.54 kg/m².     Code Status: Full Code        Illness Level (current clinical status): Watcher - Yes - NSTEMI    Reason for Admission: Chest pain    Brief HPI (pertinent PMH and diagnosis or differential diagnosis): Mr. Phelps is a 68 year old male with extensive PMH notable for pAF on amiodarone 200mg qd and apixaban 5mg bid, tachy-loan syndrome, HFpEF with most recent LVEF 65%, CAD s/p PCI 2006 on plavix, HTN, HLD, aortic atherosclerosis, secondary hyperparathyroidism, diverticulosis, COPD, GERD, hypothyroidism, gout, a history of tobacco use, obesity, ESRD s/p failed kidney transplant x2 (1992, 2005) on chronic immunosuppressive medication now on hemodialysis who presented to [facility] ED with chest pain. Per chart review, EMS activated and initial rhythm AF. Patient endorsed 2.5 hours of chest discomfort, with associated diaphoresis. Denies radiation. Of note, patient recently underwent coronary angiography in Feb 2024 and was found to have multivessel CAD. CTS evaluated and had declined patient for CABG and patient under PCI to the RCA on 02/28/2024. Mid LAD 70% + OM1/OM2 70% obstruction.     Procedure Date: Dialysis 4/10    Hospital Course (updated, brief assessment by system or problem, significant events): In the emergency department: ECG with inferolateral ST depressions. Initial troponin 0.035 --> 0.4 --> 1.9. BNP > 3000. BUN/Cr 29/5.8. WBC 6. Stable H/H at 8.2/27. Admitted to hospital medicine with cardiology consulted. He was started on ACS protocol at the recommendation of cardiology. Troponin trended until stable    Overnight 04/09 rapid response activated for respiratory distress and flash pulmonary edema. Wife at bedside  states he became acutely short of breath. Pt tripoding at bedside, satting in the 90s on nasal cannula, BP with systolic 170s. Pt transferred to MICU for NiPPV and iHD, respiratory status improved without being placed on NiPPV. Now on room air. Dialyzed last night, 2L taken off, patient currently without pain or shortness of breath. On room air, no pressors. Stepped down to floor 4/10.    Tasks (specific, using if-then statements): If hgb < 7, transfuse 1 unit pRBC.     Contingency Plan (special circumstances anticipated and plan): If MAP < 65, restart pressors and consult MICU.  If chest discomfort, STAT ECG and contact cardiology.    Estimated Discharge Date: 4/12    Discharge Disposition: Home or Self Care

## 2024-04-10 NOTE — PROGRESS NOTES
04/10/24 0130 04/10/24 0145   Vital Signs   Pulse 81 69   Resp (!) 23 (!) 21   SpO2 100 % 100 %   BP (!) 205/88 (!) 197/84   MAP (mmHg) 127 120   Assessments (Pre/Post)   Consent Obtained   (Patient receiving HD emergently.)  --    Date Hepatitis Profile Obtained   (Order placed for Hep B labs.)  --    Transport Modality bed  (HD 1:1 at bedside in ICU.)  --    Level of Consciousness (AVPU) alert  --    Pre-Hemodialysis Assessment   Patient Status Arrived  --    Treatment Status  --  Started   Pre-Treatment Weight 84.4 kg (186 lb 1.1 oz)  --    Gross Bleach Negative Yes  --    Machine Number K20  --    Alarms Verified Yes  --    pH 7.2  --    Machine Temperature 98.6 °F (37 °C)  --    Dialyzer F-160  --    Machine Conductivity 14  --    Meter Conductivity 14.2  --    Dialysate Na (mEq/L) 140  --    Dialysate K (mEq/L) 3  --    Dialysate CA (mEq/L) 2.5  --    Dialysate HCO3 (mEq/L) 35  --    Prime Ordered (mL) 250 mL  --    Net UF Goal 3000  --    Total UF Goal 3500  --    RO # / DI #  [id number]  --    RO Rejection Within Limits? Yes  --         Hemodialysis AV Fistula Left upper arm   No placement date or time found.   Present Prior to Hospital Arrival?: Yes  Size/Length: 16 G  Location: Left upper arm   Needle Size  --  16ga  (Accessed on first attempt without difficulty.)   Site Assessment  --  Clean;Dry;Intact;No redness;No swelling;No warmth;No drainage   Patency  --  Present;Thrill;Bruit   Status  --  Accessed   Flows  --  Good   Site Condition  --  No complications   During Hemodialysis Assessment   Blood Flow Rate (mL/min)  --  250 mL/min   Dialysate Flow Rate (mL/min)  --  500 ml/min   Ultrafiltration Rate (mL/Hr)  --  1170 mL/Hr   Arteriovenous Lines Secure  --  Yes   Arterial Pressure (mmHg)  --  -50 mmHg   Venous Pressure (mmHg)  --  110   Blood Volume Processed (Liters)  --  0 L   UF Removed (mL)  --  0 mL   TMP  --  70   Venous Line in Air Detector  --  Yes   Intra-Hemodialysis Comments  --  Treatment  started aseptically and running well. Face and access visible at all times.

## 2024-04-10 NOTE — ASSESSMENT & PLAN NOTE
Patient is a 69 yo male with a PMHx of paroxysmal atrial fibrillation on amiodarone 200mg qd and apixaban 5mg bid, tachy-loan syndrome, HFpEF with most recent LVEF 65%, CAD s/p PCI 2006 on plavix, HTN, HLD, aortic atherosclerosis, secondary hyperparathyroidism, diverticulosis, COPD, GERD, hypothyroidism, gout, a history of tobacco use, obesity, ESRD s/p failed kidney transplant x2 (1992, 2005) on chronic immunosuppressive medication now on hemodialysis who was admitted to [facility] on 04/09/2024 with excrutiating chest pain. ECG with NSR with inferolateral ST depressions and non-specific changes. Patient underwent coronary angiography 02/2024 and was found to have multivessel CAD. CTS had declined patient for CABG and patient under PCI to the RCA on 02/28/2024. Mid LAD 70% + OM1/OM2 70% obstruction remained medically managed as thought was that culprit vessel (RCA) was addressed.     Trop on admission 0.035 --> 0.4 --> 1.9.    - trops trended to peak, now flat  - F/u formal TTE  - Hold apixaban for now while starting ACS protocol  - Continue ACS protocol including DAPT  - Continue high intensity statin  - Continue beta blocker  - Isosorbide mononitrate daily for anti angina  - STAT ECG if patient has chest discomfort and call Cardiology   - Monitor telemetry

## 2024-04-10 NOTE — CODE/ RAPID DOCUMENTATION
RRN IV START       IV started during emergency event. 20g placed to RAC w/ ultrasound.   Please call Rapid Response RN, Amanda Mcknight RN with any questions or concerns at [phone].

## 2024-04-10 NOTE — PLAN OF CARE
MICU DAILY GOALS     Family/Goals of care/Code Status   Code Status: Full Code    24H Vital Sign Range  Temp:  [97.1 °F (36.2 °C)-99.7 °F (37.6 °C)]   Pulse:  [64-98]   Resp:  [15-45]   BP: ()/(54-94)   SpO2:  [95 %-100 %]      Shift Events (include procedures and significant events)   No acute events throughout shift; held 2nd unit of RBCs per CC team. 2 gram Mag replaced. Two therapeutic APTTs in a row.     AWAKE RASS: Goal - RASS Goal: 0-->alert and calm  Actual - RASS (Gant Agitation-Sedation Scale): alert and calm    Restraint necessity: Not necessary   BREATHE SBT: Not intubated    Coordinate A & B, analgesics/sedatives Pain: managed   SAT: Not intubated   Delirium CAM-ICU: Overall CAM-ICU: Negative   Early(intubated/ Progressive (non-intubated) Mobility MOVE Screen (INTUBATED ONLY): NA    Activity: Activity Management: Ambulated -L4, Step side-to-side along edge of bed - L3   Feeding/Nutrition Diet order: Diet/Nutrition Received: NPO,     Thrombus DVT prophylaxis: VTE Required Core Measure: Pharmacological prophylaxis initiated/maintained   HOB Elevation Head of Bed (HOB) Positioning: HOB at 30 degrees   Ulcer Prophylaxis GI: yes   Glucose control managed     Skin Skin assessed during: Q Shift Change    Sacrum intact/not altered? Yes  Heels intact/not altered? Yes  Surgical wound? No    CHECK ONE!   (no altered skin or altered skin) and sub boxes:  [x] No Altered Skin Integrity Present    [x]Prevention Measures Documented    [] Altered Skin Integrity Present or Discovered   [] LDA present in EPIC, daily doc completed              [] LDA added if not in EPIC (describe wound).                    When describing wound, do not stage, use descriptive words only.    [] Wound Image Taken (required on admit,                   transfer/discharge and every Tuesday)    Wound Care Consulted? No    4 EYES:  Attending Nurse (1st set of eyes): TAMEKA Marino RN    Second RN/Staff Member (2nd set of eyes): AUTUMN Croft RN    Bowel Function no issues    Indwelling Catheter Necessity         Voids spontaneously via urinal   De-escalation Antibiotics No        VS and assessment per flow sheet, patient progressing towards goals as tolerated, plan of care reviewed with  patient and wife .      Problem: Adult Inpatient Plan of Care  Goal: Patient-Specific Goal (Individualized)  Outcome: Ongoing, Progressing  Goal: Optimal Comfort and Wellbeing  Outcome: Ongoing, Progressing  Intervention: Provide Person-Centered Care  Flowsheets (Taken 4/10/2024 1625)  Trust Relationship/Rapport:   care explained   choices provided   emotional support provided   questions answered   thoughts/feelings acknowledged   questions encouraged   empathic listening provided   reassurance provided  Goal: Readiness for Transition of Care  Outcome: Ongoing, Progressing     Problem: Hemodynamic Instability (Hemodialysis)  Goal: Effective Tissue Perfusion  Outcome: Met     Problem: Fall Injury Risk  Goal: Absence of Fall and Fall-Related Injury  Outcome: Ongoing, Progressing  Intervention: Promote Injury-Free Environment  Flowsheets (Taken 4/10/2024 1625)  Safety Promotion/Fall Prevention:   assistive device/personal item within reach   side rails raised x 3

## 2024-04-10 NOTE — NURSING
Called into room , patient breathing using accessory muscles rate 22 skin warm and dry , oyxgen measured at 88% placed on 3 liter of O2 , Lungs crackles/ rales heard throughout , patient reports missing dialysis today , he stated he goes T. TH and Sat, has shunt to left upper arm good thrill audible bruit not as pronounced, Left arm good pulses , reports home nebulizer , uses at times as need . Vitals are noted . Denies chest pain.  Resp became less labored sats 98 % , reports breathing slightly easier .   Family at bedside update given ., Called for STAT EKG, awaiting , patient ate 3 bites from hamburger , tolerated meds with water , AAOx4 , follows commands .

## 2024-04-10 NOTE — SIGNIFICANT EVENT
Significant Event  Hospital Medicine    CC:  Chest pain  Date:  04/09/2024  Admit Date:  4/9/2024  Hospital Length of Stay:  0    Code Status:  Full Code     SUBJECTIVE:     Significant Events:  Called urgently to the bedside by nurse to evaluate patient for SOB. Patient became acutely SOB with increasing WOB and profuse diaphoresis. Rapid response called. Patient with severe respiratory distress with accessory muscle usage and diffuse wheezing, profusely diaphoretic. Satting 100% on 3L but attempted to place on NRB for comfort but unable to tolerate. SBP 190s. Lactic 3.2. Cardiology called to bedside, suspect type 2 demand from Afib with RVR earlier and inability to clear trop 2/2 ESRD. Spoke with nephrology, unable to perform HD on floor due to lack of nurses/machines. Will need MICU for emergent CRRT and respiratory support. Patient transferred to MICU.     Uninterrupted Critical Care/Counseling Time (not including procedures):  60 minutes    Denae Spears PA-C  Hospital Medicine Department  Staff: Dr. Kalia Villeda

## 2024-04-10 NOTE — NURSING
"2150 RN went into pt's room to start a new IV access, but pt c/o SOB. Increased oxygen to 3 L and pt's O2 Sat 93-95%, but pt still feeling SOB, stating "can't breathe." Increased oxygen to 4.5 L. Pt became very diaphoretic.     2155 Charge RN notified, Rapid response team called. RT contacted.     2205 On-call MARISA Spears notified. STAT EKG and Chest x-ray ordered. Will come to bedside. Rapid response at bedside.    2207 tried to put pt on non-rebreather but pt doesn't tolerate it and kept trying to take it off.    2210 MARISA Spears at bedside.     2216 ABG obtained. Lactate 3.2. Labs obtained and sent to core lab by rapid response RN.     2220 CCU MD at bedside.     EKG and Chest X ray obtained.    IVP lasix 80 mg given by Rapid response RN.     Plan to transfer to ICU for CRRT, ICU MD came to bedside and room 6092 assigned.     2250 Pt left via bed with Rapid response team. Report given to MICU RN via phone call.     "

## 2024-04-11 PROBLEM — D63.1 ANEMIA IN ESRD (END-STAGE RENAL DISEASE): Status: ACTIVE | Noted: 2024-04-11

## 2024-04-11 PROBLEM — N18.6 ANEMIA IN ESRD (END-STAGE RENAL DISEASE): Status: ACTIVE | Noted: 2024-04-11

## 2024-04-11 LAB
ANION GAP SERPL CALC-SCNC: 8 MMOL/L (ref 8–16)
APTT PPP: 38.6 SEC (ref 21–32)
APTT PPP: 60.9 SEC (ref 21–32)
APTT PPP: 65.2 SEC (ref 21–32)
BASOPHILS # BLD AUTO: 0.01 K/UL (ref 0–0.2)
BASOPHILS NFR BLD: 0.2 % (ref 0–1.9)
BUN SERPL-MCNC: 27 MG/DL (ref 8–23)
CALCIUM SERPL-MCNC: 7.7 MG/DL (ref 8.7–10.5)
CHLORIDE SERPL-SCNC: 105 MMOL/L (ref 95–110)
CO2 SERPL-SCNC: 25 MMOL/L (ref 23–29)
CREAT SERPL-MCNC: 5 MG/DL (ref 0.5–1.4)
DIFFERENTIAL METHOD BLD: ABNORMAL
EOSINOPHIL # BLD AUTO: 0.3 K/UL (ref 0–0.5)
EOSINOPHIL NFR BLD: 3.8 % (ref 0–8)
ERYTHROCYTE [DISTWIDTH] IN BLOOD BY AUTOMATED COUNT: 17.7 % (ref 11.5–14.5)
EST. GFR  (NO RACE VARIABLE): 11.8 ML/MIN/1.73 M^2
GLUCOSE SERPL-MCNC: 85 MG/DL (ref 70–110)
HCT VFR BLD AUTO: 22.2 % (ref 40–54)
HGB BLD-MCNC: 7 G/DL (ref 14–18)
IMM GRANULOCYTES # BLD AUTO: 0.02 K/UL (ref 0–0.04)
IMM GRANULOCYTES NFR BLD AUTO: 0.3 % (ref 0–0.5)
LYMPHOCYTES # BLD AUTO: 1.4 K/UL (ref 1–4.8)
LYMPHOCYTES NFR BLD: 21.1 % (ref 18–48)
MAGNESIUM SERPL-MCNC: 2.4 MG/DL (ref 1.6–2.6)
MCH RBC QN AUTO: 25.4 PG (ref 27–31)
MCHC RBC AUTO-ENTMCNC: 31.5 G/DL (ref 32–36)
MCV RBC AUTO: 80 FL (ref 82–98)
MONOCYTES # BLD AUTO: 0.6 K/UL (ref 0.3–1)
MONOCYTES NFR BLD: 8.4 % (ref 4–15)
NEUTROPHILS # BLD AUTO: 4.4 K/UL (ref 1.8–7.7)
NEUTROPHILS NFR BLD: 66.2 % (ref 38–73)
NRBC BLD-RTO: 0 /100 WBC
OHS QRS DURATION: 104 MS
OHS QTC CALCULATION: 506 MS
PHOSPHATE SERPL-MCNC: 2.4 MG/DL (ref 2.7–4.5)
PLATELET # BLD AUTO: 211 K/UL (ref 150–450)
PMV BLD AUTO: 11.5 FL (ref 9.2–12.9)
POTASSIUM SERPL-SCNC: 3.6 MMOL/L (ref 3.5–5.1)
RBC # BLD AUTO: 2.76 M/UL (ref 4.6–6.2)
SODIUM SERPL-SCNC: 138 MMOL/L (ref 136–145)
TACROLIMUS BLD-MCNC: 4.4 NG/ML (ref 5–15)
TROPONIN I SERPL DL<=0.01 NG/ML-MCNC: 3.31 NG/ML (ref 0–0.03)
URATE SERPL-MCNC: 5.2 MG/DL (ref 3.4–7)
WBC # BLD AUTO: 6.63 K/UL (ref 3.9–12.7)

## 2024-04-11 PROCEDURE — 99233 SBSQ HOSP IP/OBS HIGH 50: CPT | Mod: ,,, | Performed by: INTERNAL MEDICINE

## 2024-04-11 PROCEDURE — 85730 THROMBOPLASTIN TIME PARTIAL: CPT

## 2024-04-11 PROCEDURE — 25000003 PHARM REV CODE 250

## 2024-04-11 PROCEDURE — 84100 ASSAY OF PHOSPHORUS: CPT

## 2024-04-11 PROCEDURE — 99223 1ST HOSP IP/OBS HIGH 75: CPT | Mod: ,,, | Performed by: INTERNAL MEDICINE

## 2024-04-11 PROCEDURE — 63600175 PHARM REV CODE 636 W HCPCS

## 2024-04-11 PROCEDURE — 99232 SBSQ HOSP IP/OBS MODERATE 35: CPT | Mod: ,,, | Performed by: INTERNAL MEDICINE

## 2024-04-11 PROCEDURE — 85025 COMPLETE CBC W/AUTO DIFF WBC: CPT

## 2024-04-11 PROCEDURE — 80197 ASSAY OF TACROLIMUS: CPT | Performed by: INTERNAL MEDICINE

## 2024-04-11 PROCEDURE — 94640 AIRWAY INHALATION TREATMENT: CPT

## 2024-04-11 PROCEDURE — 85730 THROMBOPLASTIN TIME PARTIAL: CPT | Mod: 91 | Performed by: INTERNAL MEDICINE

## 2024-04-11 PROCEDURE — 94761 N-INVAS EAR/PLS OXIMETRY MLT: CPT

## 2024-04-11 PROCEDURE — 84550 ASSAY OF BLOOD/URIC ACID: CPT

## 2024-04-11 PROCEDURE — 83735 ASSAY OF MAGNESIUM: CPT

## 2024-04-11 PROCEDURE — 20600001 HC STEP DOWN PRIVATE ROOM

## 2024-04-11 PROCEDURE — 97165 OT EVAL LOW COMPLEX 30 MIN: CPT

## 2024-04-11 PROCEDURE — 97161 PT EVAL LOW COMPLEX 20 MIN: CPT

## 2024-04-11 PROCEDURE — 25000003 PHARM REV CODE 250: Performed by: INTERNAL MEDICINE

## 2024-04-11 PROCEDURE — 80048 BASIC METABOLIC PNL TOTAL CA: CPT

## 2024-04-11 PROCEDURE — 99900035 HC TECH TIME PER 15 MIN (STAT)

## 2024-04-11 PROCEDURE — 84484 ASSAY OF TROPONIN QUANT: CPT

## 2024-04-11 RX ADMIN — MUPIROCIN: 20 OINTMENT TOPICAL at 08:04

## 2024-04-11 RX ADMIN — METOPROLOL SUCCINATE 12.5 MG: 25 TABLET, EXTENDED RELEASE ORAL at 08:04

## 2024-04-11 RX ADMIN — DOXAZOSIN 4 MG: 2 TABLET ORAL at 08:04

## 2024-04-11 RX ADMIN — HYDROCODONE BITARTRATE AND ACETAMINOPHEN 1 TABLET: 10; 325 TABLET ORAL at 02:04

## 2024-04-11 RX ADMIN — ISOSORBIDE MONONITRATE 30 MG: 30 TABLET, EXTENDED RELEASE ORAL at 08:04

## 2024-04-11 RX ADMIN — HYDROCODONE BITARTRATE AND ACETAMINOPHEN 1 TABLET: 5; 325 TABLET ORAL at 08:04

## 2024-04-11 RX ADMIN — ASPIRIN 81 MG CHEWABLE TABLET 81 MG: 81 TABLET CHEWABLE at 08:04

## 2024-04-11 RX ADMIN — ONDANSETRON 8 MG: 8 TABLET, ORALLY DISINTEGRATING ORAL at 12:04

## 2024-04-11 RX ADMIN — PANTOPRAZOLE SODIUM 40 MG: 40 TABLET, DELAYED RELEASE ORAL at 08:04

## 2024-04-11 RX ADMIN — FLUTICASONE FUROATE AND VILANTEROL TRIFENATATE 1 PUFF: 100; 25 POWDER RESPIRATORY (INHALATION) at 08:04

## 2024-04-11 RX ADMIN — PREDNISONE 5 MG: 5 TABLET ORAL at 08:04

## 2024-04-11 RX ADMIN — NIFEDIPINE 60 MG: 60 TABLET, FILM COATED, EXTENDED RELEASE ORAL at 08:04

## 2024-04-11 RX ADMIN — HEPARIN SODIUM 14 UNITS/KG/HR: 10000 INJECTION, SOLUTION INTRAVENOUS at 03:04

## 2024-04-11 RX ADMIN — THERA TABS 1 TABLET: TAB at 08:04

## 2024-04-11 RX ADMIN — CHOLECALCIFEROL TAB 25 MCG (1000 UNIT) 1000 UNITS: 25 TAB at 08:04

## 2024-04-11 RX ADMIN — HYDRALAZINE HYDROCHLORIDE 50 MG: 50 TABLET, FILM COATED ORAL at 02:04

## 2024-04-11 RX ADMIN — ATORVASTATIN CALCIUM 80 MG: 40 TABLET, FILM COATED ORAL at 08:04

## 2024-04-11 RX ADMIN — HYDRALAZINE HYDROCHLORIDE 50 MG: 50 TABLET, FILM COATED ORAL at 08:04

## 2024-04-11 RX ADMIN — TIOTROPIUM BROMIDE INHALATION SPRAY 2 PUFF: 1.56 SPRAY, METERED RESPIRATORY (INHALATION) at 08:04

## 2024-04-11 RX ADMIN — TACROLIMUS 3 MG: 1 CAPSULE ORAL at 08:04

## 2024-04-11 RX ADMIN — TACROLIMUS 3 MG: 1 CAPSULE ORAL at 05:04

## 2024-04-11 RX ADMIN — CLOPIDOGREL BISULFATE 75 MG: 75 TABLET ORAL at 08:04

## 2024-04-11 RX ADMIN — AMIODARONE HYDROCHLORIDE 200 MG: 200 TABLET ORAL at 08:04

## 2024-04-11 NOTE — SUBJECTIVE & OBJECTIVE
Interval History/Significant Events: NAEO. Stable for stepdown. Will f/u with daily Cardiology recs. Pt c/o severe R ankle pain. Has hx of gout, but no longer takes gout medication. Will order Uric Acid and R ankle X-ray. Hopeful pt will get dialysis today as well.     Review of Systems   Constitutional:  Negative for activity change, appetite change, chills, diaphoresis and fever.   HENT:  Negative for congestion and sore throat.    Respiratory:  Negative for cough, shortness of breath and wheezing.    Cardiovascular:  Negative for chest pain.   Musculoskeletal:  Positive for joint swelling (R ankle).   Neurological:  Negative for dizziness, light-headedness and headaches.     Objective:     Vital Signs (Most Recent):  Temp: 97.7 °F (36.5 °C) (04/10/24 1900)  Pulse: 64 (04/11/24 0501)  Resp: 13 (04/11/24 0500)  BP: (!) 115/53 (04/11/24 0400)  SpO2: 99 % (04/11/24 0500) Vital Signs (24h Range):  Temp:  [97.2 °F (36.2 °C)-98.2 °F (36.8 °C)] 97.7 °F (36.5 °C)  Pulse:  [58-75] 64  Resp:  [13-39] 13  SpO2:  [96 %-99 %] 99 %  BP: ()/(40-72) 115/53   Weight: 84.4 kg (186 lb 1.1 oz)  Body mass index is 24.55 kg/m².      Intake/Output Summary (Last 24 hours) at 4/11/2024 0836  Last data filed at 4/11/2024 0401  Gross per 24 hour   Intake 214.62 ml   Output 200 ml   Net 14.62 ml          Physical Exam  Vitals and nursing note reviewed.   Constitutional:       General: He is not in acute distress.     Appearance: Normal appearance. He is normal weight. He is not ill-appearing or toxic-appearing.   HENT:      Head: Normocephalic and atraumatic.      Mouth/Throat:      Mouth: Mucous membranes are moist.   Eyes:      General: No scleral icterus.        Right eye: No discharge.         Left eye: No discharge.      Conjunctiva/sclera: Conjunctivae normal.   Cardiovascular:      Rate and Rhythm: Regular rhythm. Tachycardia present.      Heart sounds: No murmur heard.  Pulmonary:      Effort: Pulmonary effort is normal. No  respiratory distress.      Breath sounds: Normal breath sounds. No wheezing or rales.   Abdominal:      General: Abdomen is flat. There is no distension.   Musculoskeletal:         General: Tenderness present.      Right lower leg: No edema.      Left lower leg: No edema.      Right ankle: No swelling, deformity or ecchymosis. Tenderness present over the lateral malleolus. Decreased range of motion.      Left ankle: Normal.      Comments: Severe R ankle tenderness to palpation at R lateral malleolus    Skin:     General: Skin is warm and dry.      Coloration: Skin is not pale.      Findings: No erythema.   Neurological:      Mental Status: He is alert and oriented to person, place, and time.   Psychiatric:         Mood and Affect: Mood normal.         Behavior: Behavior normal.            Vents:     Lines/Drains/Airways       Peripheral Intravenous Line  Duration                  Hemodialysis AV Fistula Left upper arm -- days         Hemodialysis AV Fistula 02/19/24 0924 Left upper arm 51 days         Midline Catheter - Single Lumen 04/10/24 0030 Right basilic vein (medial side of arm) 1 day         Peripheral IV - Single Lumen 04/10/24 0230 20 G Right Antecubital 1 day         Peripheral IV - Single Lumen 04/10/24 1000 22 G Distal;Posterior;Right Forearm <1 day                  Significant Labs:    CBC/Anemia Profile:  Recent Labs   Lab 04/10/24  0123 04/10/24  0605 04/11/24  0335   WBC 11.54 9.38  9.38 6.63   HGB 6.8* 7.7*  7.7* 7.0*   HCT 22.2* 24.6*  24.6* 22.2*    232  232 211   MCV 83 82  82 80*   RDW 17.7* 17.2*  17.2* 17.7*        Chemistries:  Recent Labs   Lab 04/09/24  1157 04/09/24  2219 04/10/24  0605 04/11/24  0335    141 138 138   K 3.6 4.2 4.1 3.6    107 105 105   CO2 20* 20* 25 25   BUN 29* 31* 18 27*   CREATININE 5.8* 5.9* 3.7* 5.0*   CALCIUM 8.4* 8.3* 8.1* 7.7*   ALBUMIN 3.0*  --   --   --    PROT 6.8  --   --   --    BILITOT 0.6  --   --   --    ALKPHOS 81  --   --   --     ALT 17  --   --   --    AST 16  --   --   --    MG  --  2.1 1.9 2.4   PHOS  --  2.8 1.9* 2.4*       All pertinent labs within the past 24 hours have been reviewed.    Significant Imaging:  I have reviewed all pertinent imaging results/findings within the past 24 hours.

## 2024-04-11 NOTE — CONSULTS
[facility] - Cardiac Medical ICU  Kidney Transplant  Consult Note    Inpatient consult to Kidney/Pancreas Transplant Medicine  Consult performed by: Lv Suazo MD  Consult ordered by: Tylor Bowling DO  Reason for consult: immunosurpression            Subjective:     History of Present Illness:   Mr. Phelps is a 68 year old male with extensive PMH notable for pAF on amiodarone 200mg qd and apixaban 5mg bid, tachy-loan syndrome, HFpEF with most recent LVEF 65%, CAD s/p PCI 2006 on plavix, HTN, HLD, aortic atherosclerosis, secondary hyperparathyroidism, diverticulosis, COPD, GERD, hypothyroidism, gout, a history of tobacco use, obesity, ESRD s/p failed kidney transplant x2 (1992, 2005) on chronic immunosuppressive medication now on hemodialysis who presented to [facility] ED with chest pain. In the emergency department: ECG with inferolateral ST depressions. Initial troponin 0.035 --> 0.4 --> 1.9. BNP > 3000. BUN/Cr 29/5.8. WBC 6. Stable H/H at 8.2/27. He was started on ACS protocol.     Overnight 04/09 rapid response activated for respiratory distress and flash pulmonary edema. Pt tripoding at bedside, satting in the 90s on nasal cannula, BP with systolic 170s. Pt transferred to MICU for NiPPV and iHD, respiratory status improved. KTS consulted for assistance in his IS medications.     Mr. Phelps is a 68 y.o. year old male who is status post Kidney Transplant Referral - 10/24/2023 - Declined.    He his maintenance immunosuppression consists of:   Immunosuppressants (From admission, onward)      Start     Stop Route Frequency Ordered    04/09/24 1800  tacrolimus capsule 3 mg         -- Oral 2 times daily 04/09/24 1536            Interval History:      Past Medical and Surgical History: Mr. Phelps has a past medical history of Atrial fibrillation, CAD (coronary artery disease) (2006), CHF (congestive heart failure) (2017), CKD (chronic kidney disease) stage 3, GFR 30-59 ml/min, CKD (chronic kidney disease) stage 5, GFR  less than 15 ml/min (02/02/2024), COVID-19 (02/07/2023), Diverticulosis, Hyperlipidemia, Hypertension, Keloid cicatrix, Metabolic bone disease, Other emphysema (10/01/2019), Pericarditis, S/P kidney transplant (1992), Thrombocytopenia, Thyroid disease, and Tobacco use (09/05/2014).  He has a past surgical history that includes Cholecystectomy; Cardioversion (04/30/15); Kidney transplant (2005); Parathyroidectomy; Colonoscopy (April 20, 2011); Colonoscopy (N/A, 3/13/2020); Colonoscopy (N/A, 2/4/2021); Colonoscopy (N/A, 3/3/2021); Coronary angioplasty with stent (9/13/2006); Treatment of cardiac arrhythmia (N/A, 3/28/2022); irrigation and debridement (Right, 8/30/2023); AV fistula placement (Left, 12/18/2023); Percutaneous transluminal angioplasty of arteriovenous fistula (Left, 2/19/2024); Fistulogram (N/A, 2/19/2024); Left heart catheterization (N/A, 2/26/2024); stent, drug eluting, single vessel, coronary (N/A, 2/28/2024); and Coronary angiography (N/A, 2/28/2024).    Past Social and Family History: Mr. Phelps reports that he has been smoking cigarettes. He started smoking about 42 years ago. He has a 20.0 pack-year smoking history. He has never used smokeless tobacco. He reports that he does not drink alcohol and does not use drugs.His family history includes Heart disease in his father; Kidney disease in his brother and sister; Kidney failure in his brother and sister; No Known Problems in his brother, sister, sister, and sister; Thyroid disease in his mother.    Intake/Output - Last 3 Shifts         04/09 0700  04/10 0659 04/10 0700  04/11 0659 04/11 0700 04/12 0659    I.V. (mL/kg) 70 (0.8) 223.4 (2.6)     Blood 350      IV Piggyback 40 24.5     Total Intake(mL/kg) 460 (5.4) 247.9 (2.9)     Urine (mL/kg/hr)  200 (0.1)     Other 2800      Total Output 2800 200     Net -2340 +47.9                     Review of Systems   Constitutional:  Negative for activity change, chills, diaphoresis and fatigue.   Respiratory:   "Negative for cough, chest tightness, shortness of breath and wheezing.    Cardiovascular:  Negative for chest pain, palpitations and leg swelling.   Gastrointestinal:  Negative for constipation, diarrhea, nausea and vomiting.   Skin:  Negative for color change, pallor, rash and wound.   Neurological:  Negative for dizziness, tremors, syncope, weakness and numbness.     Objective:     Vital Signs (Most Recent):  Temp: 98 °F (36.7 °C) (04/11/24 0800)  Pulse: 63 (04/11/24 1100)  Resp: 17 (04/11/24 1100)  BP: (!) 141/81 (04/11/24 0800)  SpO2: 98 % (04/11/24 1100) Vital Signs (24h Range):  Temp:  [97.2 °F (36.2 °C)-98 °F (36.7 °C)] 98 °F (36.7 °C)  Pulse:  [58-76] 63  Resp:  [13-39] 17  SpO2:  [96 %-100 %] 98 %  BP: ()/(40-81) 141/81     Weight: 84.4 kg (186 lb 1.1 oz)  Height: 6' 1" (185.4 cm)  Body mass index is 24.55 kg/m².     Physical Exam  Constitutional:       Appearance: Normal appearance.   HENT:      Head: Normocephalic and atraumatic.      Right Ear: External ear normal.      Left Ear: External ear normal.      Nose: Nose normal.      Mouth/Throat:      Mouth: Mucous membranes are moist.   Eyes:      Extraocular Movements: Extraocular movements intact.   Cardiovascular:      Rate and Rhythm: Normal rate and regular rhythm.      Pulses: Normal pulses.      Heart sounds: Normal heart sounds.      Comments: LUE AVF with good thrill.   Pulmonary:      Effort: Pulmonary effort is normal. No respiratory distress.      Breath sounds: Normal breath sounds. No wheezing or rales.   Chest:      Chest wall: No tenderness.   Abdominal:      General: Abdomen is flat. There is no distension.      Palpations: Abdomen is soft.      Tenderness: There is no abdominal tenderness. There is no right CVA tenderness, left CVA tenderness or guarding.   Musculoskeletal:      Right lower leg: No edema.      Left lower leg: No edema.   Skin:     General: Skin is warm and dry.      Capillary Refill: Capillary refill takes less than " 2 seconds.   Neurological:      General: No focal deficit present.      Mental Status: He is alert and oriented to person, place, and time.   Psychiatric:         Mood and Affect: Mood normal.         Behavior: Behavior normal.         Thought Content: Thought content normal.         Judgment: Judgment normal.          Significant Labs:  CBC:   Recent Labs   Lab 04/10/24  0123 04/10/24  0605 04/11/24  0335   WBC 11.54 9.38  9.38 6.63   RBC 2.68* 3.02*  3.02* 2.76*   HGB 6.8* 7.7*  7.7* 7.0*   HCT 22.2* 24.6*  24.6* 22.2*    232  232 211   MCV 83 82  82 80*   MCH 25.4* 25.5*  25.5* 25.4*   MCHC 30.6* 31.3*  31.3* 31.5*     CMP:   Recent Labs   Lab 04/09/24  1157 04/09/24  2219 04/10/24  0605 04/11/24  0335   GLU 92 119* 76 85   CALCIUM 8.4* 8.3* 8.1* 7.7*   ALBUMIN 3.0*  --   --   --    PROT 6.8  --   --   --     141 138 138   K 3.6 4.2 4.1 3.6   CO2 20* 20* 25 25    107 105 105   BUN 29* 31* 18 27*   CREATININE 5.8* 5.9* 3.7* 5.0*   ALKPHOS 81  --   --   --    ALT 17  --   --   --    AST 16  --   --   --        Diagnostics:    Assessment/Plan:     Cardiac/Vascular  Primary hypertension  -Resume home mediations  -Adjust medications as needed  -Goal blood pressure <130/80        Renal/  ESRD (end stage renal disease)  General nephrology following for assistance with hemodialysis and EPO.    H/O kidney transplant  Patient is failed kidney transplant 1992 and again in 2005, now ESRD on HD via a LUE AVF at [facility] iHD TTS schedule. He remains on immunosuppression with tacrolimus 3 mg BID and prednisone 5 mg daily, and denies pain over the allograft site.     Recommendations  -Continue Tacrolimus and check levels daily. Will adjust dose as needed.  -Continue home prednisone 5 mg daily         Immunology/Multi System  Prophylactic immunotherapy  See History of kidney transplant.       Oncology  Anemia in ESRD (end-stage renal disease)  General nephrology following along with the patient  for EPO injections with his iHD sessions.           Discharge Planning:  Per primary team      Lv Suazo MD  Kidney Transplant  [facility] - Cardiac Medical ICU

## 2024-04-11 NOTE — HOSPITAL COURSE
In the emergency department: ECG with inferolateral ST depressions. Initial troponin 0.035 --> 0.4 --> 1.9. BNP > 3000. BUN/Cr 29/5.8. WBC 6. Stable H/H at 8.2/27. Admitted to hospital medicine with cardiology consulted. He was started on ACS protocol at the recommendation of cardiology. Troponin trended until stable    Overnight 04/09 rapid response activated for respiratory distress and flash pulmonary edema. Wife at bedside states he became acutely short of breath. Pt tripoding at bedside, satting in the 90s on nasal cannula, BP with systolic 170s. Pt transferred to MICU for NiPPV and iHD, respiratory status improved without being placed on NiPPV. Now on room air. Dialyzed last night, 2L taken off, patient currently without pain or shortness of breath. On room air, no pressors. Stepped down to floor 4/10. Pt c/o severe R ankle pain on 4/11. Uric acid within normal limits, and R ankle X-ray without fracture or dislocation. On 4/12, patient received dialysis; also received 1 unit PRBCs for hgb 6.8. At this time, patient is stable for discharge with cardiology and PCP follow up. Holding antihypertensives until patient is able to follow up with PCP and cardiology. Continuing metoprolol and amiodarone for atrial fibrillation.

## 2024-04-11 NOTE — PT/OT/SLP EVAL
Occupational Therapy  Co Evaluation and Treatment    Name: Ibrahima Phelps  MRN: [id number]  Admitting Diagnosis: Chest pain  Pt with 2 failed kidney transplants.   Pt admitted with CP and was t/f to ICU with respiratory distress and flash pulmonary edema   Recommendations:     Discharge Recommendations: No Therapy Indicated      Assessment:     Ibrahima Phelps is a 68 y.o. male with a medical diagnosis of Chest pain.  Pt tolerated session well and does not demo need for skilled OT at this time.       Plan:   D/C OT 4/11/2024   Subjective     Pt agreeable to therapy.       Occupational Profile:  Pt lives with spouse in one story home with no steps to enter. He reports he was independent prior to arrival.   Pt has a rollator he does not use. Pt has a tub/shower combo.  Pt drives. Spouse home to assist on d/c if needed     Pain/Comfort:  Pain Rating 1: 3/10  Location - Side 1: Right  Location 1: ankle  Pain Addressed 1: Reposition, Distraction    Patients cultural, spiritual, Anabaptism conflicts given the current situation: no    Objective:     Communicated with: nsg prior to session.  Patient found in chair with tele, pulse ox and BP cuff. Pt on RA. Spouse in room.  Coeval completed this date to optimize functional performance and safety     General Precautions: Standard,  fall   Occupational Performance:    Pt demo supervision for sit<>stand and functional mobility in room and hallway. Pt reports pain in right ankle, but declined use of RW. Pt continued to ambulate in room  and hallway placing limited weight throughout right foot. Pt reports nsg and MD aware of ankle pain.   Pt demo adequate standing balance and endurance as well as B UE integration and cognitive skills to complete basic ADL skills with supervision.   Education provided re: role of OT and d/c of OT services.     Cognitive/Visual Perceptual:  Pt alert, orientated and following commands.     Physical Exam:  Pt demo WFL UE strength/ROM, coordination and  "sensation     AMPA 6 Click ADL:  AMPAC Total Score: 19    Patient left up in chair with all lines intact, call button in reach, and spouse and son present    GOALS:   Multidisciplinary Problems       Occupational Therapy Goals       Not on file                    History:     Past Medical History:   Diagnosis Date    Atrial fibrillation     CAD (coronary artery disease) 2006    MI in 2006    CHF (congestive heart failure) 2017    CKD (chronic kidney disease) stage 3, GFR 30-59 ml/min     Dr. Latif.  in transplanted kidney    CKD (chronic kidney disease) stage 5, GFR less than 15 ml/min 02/02/2024    COVID-19 02/07/2023    Diverticulosis     Hyperlipidemia     Hypertension     Keloid cicatrix     Metabolic bone disease     Other emphysema 10/01/2019    Pericarditis     S/P kidney transplant 1992    x2 (1992 & 2005),    Thrombocytopenia     Thyroid disease     Tobacco use 09/05/2014         Past Surgical History:   Procedure Laterality Date    AV FISTULA PLACEMENT Left 12/18/2023    Procedure: CREATION, AV FISTULA;  Surgeon: JUANI Melendez III, MD;  Location: [facility] OR [address] [place] [address];  Service: Vascular;  Laterality: Left;  LUE AVF creation vs AVG insertion    CARDIOVERSION  04/30/15    CHOLECYSTECTOMY      COLONOSCOPY  April 20, 2011    Diverticulosis, repeat recommended in 3 yrs., repeat colonoscopy 2014 revealed 2 polyps.  He should return in 5 years.    COLONOSCOPY N/A 3/13/2020    Procedure: COLONOSCOPY;  Surgeon: Chon Casper MD;  Location: [facility] (4TH FLR);  Service: Endoscopy;  Laterality: N/A;  ok to hold coumadin x5days-see telephone encounter 2/4/20-tb    COLONOSCOPY N/A 2/4/2021    Procedure: COLONOSCOPY;  Surgeon: Chon Casper MD;  [facility]  Service: Endoscopy;  Laterality: N/A;  Eliquis - per Dr. GAVIN Blunt ok to hold x 2 days and "restarted asap"- ERW  last prep "poor", mms2023 ordered/ Pt requests Dr. Casper  prep ins. mailed - COVID screening on 2/1/21 PCW- ERW    " COLONOSCOPY N/A 3/3/2021    Procedure: COLONOSCOPY;  Surgeon: Chon Casper MD;  Location: [facility] ENDO (4TH FLR);  Service: Endoscopy;  Laterality: N/A;  Patient with his second poor bowel pre and has poor prep today.  If patient not intersted or can't get colonoscopy tomorrow will need constipation bowel prep and will need to restart his Eliquis today.Thanks,Chon     per Dr Blunt-ok to hold Eliquis 2 days prior      COVID     CORONARY ANGIOGRAPHY N/A 2/28/2024    Procedure: ANGIOGRAM, CORONARY ARTERY;  Surgeon: Tylor Lincoln MD;  Location: [facility] CATH LAB;  Service: Cardiology;  Laterality: N/A;    CORONARY ANGIOPLASTY WITH STENT PLACEMENT  9/13/2006    FISTULOGRAM N/A 2/19/2024    Procedure: Fistulogram;  Surgeon: JUANI Melendez III, MD;  Location: [facility] CATH LAB;  Service: Peripheral Vascular;  Laterality: N/A;    IRRIGATION AND DEBRIDEMENT Right 8/30/2023    Procedure: IRRIGATION AND DEBRIDEMENT, RIGHT MIDDLE FINGER;  Surgeon: Martin Larsen MD;  Location: [facility] OR 2ND FLR;  Service: Orthopedics;  Laterality: Right;    KIDNEY TRANSPLANT  2005    LEFT HEART CATHETERIZATION N/A 2/26/2024    Procedure: Left heart cath;  Surgeon: Tylor Lincoln MD;  Location: [facility] CATH LAB;  Service: Cardiology;  Laterality: N/A;    PARATHYROIDECTOMY      PERCUTANEOUS TRANSLUMINAL ANGIOPLASTY OF ARTERIOVENOUS FISTULA Left 2/19/2024    Procedure: PTA, AV FISTULA;  Surgeon: JUANI Melendez III, MD;  Location: [facility] CATH LAB;  Service: Peripheral Vascular;  Laterality: Left;    STENT, DRUG ELUTING, SINGLE VESSEL, CORONARY N/A 2/28/2024    Procedure: Stent, Drug Eluting, Single Vessel, Coronary;  Surgeon: Tylor Lincoln MD;  Location: [facility] CATH LAB;  Service: Cardiology;  Laterality: N/A;    TREATMENT OF CARDIAC ARRHYTHMIA N/A 3/28/2022    Procedure: CARDIOVERSION;  Surgeon: Migue Blunt MD;  Location: [facility] EP LAB;  Service: Cardiology;  Laterality: N/A;  AF, DCC, MAC, GP, 3 PREP*RODRIGO deferred pt 100%  compliant*       Time Tracking:     OT Date of Treatment: 04/11/24  OT Start Time: 1325  OT Stop Time: 1335  OT Total Time (min): 10 min    Billable Minutes:Evaluation 10    4/11/2024

## 2024-04-11 NOTE — PLAN OF CARE
MICU DAILY GOALS     Family/Goals of care/Code Status   Code Status: Full Code    24H Vital Sign Range  Temp:  [97.2 °F (36.2 °C)-99.7 °F (37.6 °C)]   Pulse:  [58-76]   Resp:  [15-41]   BP: ()/(40-74)   SpO2:  [96 %-99 %]      Shift Events (include procedures and significant events)  No acute events throughout shift. Stepdown transfer orders in place.     AWAKE RASS: Goal - RASS Goal: 0-->alert and calm  Actual - RASS (Gant Agitation-Sedation Scale): alert and calm    Restraint necessity: Not necessary   BREATHE SBT: Not intubated    Coordinate A & B, analgesics/sedatives Pain: managed   SAT: Not intubated   Delirium CAM-ICU: Overall CAM-ICU: Negative   Early(intubated/ Progressive (non-intubated) Mobility MOVE Screen (INTUBATED ONLY): Not intubated    Activity: Activity Management: Rolling - L1, Arm raise - L1, Straight leg raise - L1   Feeding/Nutrition Diet order: Diet/Nutrition Received: NPO,     Thrombus DVT prophylaxis: VTE Required Core Measure: Pharmacological prophylaxis initiated/maintained   HOB Elevation Head of Bed (HOB) Positioning: HOB at 20-30 degrees   Ulcer Prophylaxis GI:    Glucose control managed     Skin Skin assessed during: Q Shift Change    Sacrum intact/not altered? Yes  Heels intact/not altered? Yes  Surgical wound? No    CHECK ONE!   (no altered skin or altered skin) and sub boxes:  [x] No Altered Skin Integrity Present    [x]Prevention Measures Documented    [] Altered Skin Integrity Present or Discovered   [] LDA present in EPIC, daily doc completed              [] LDA added if not in EPIC (describe wound).                    When describing wound, do not stage, use descriptive words only.    [] Wound Image Taken (required on admit,                   transfer/discharge and every Tuesday)    Wound Care Consulted? No    4 EYES:  Attending Nurse (1st set of eyes): Andrea Croft RN    Second RN/Staff Member (2nd set of eyes): Rossana Horan RN   Bowel Function no issues     Indwelling Catheter Necessity         De-escalation Antibiotics         VS and assessment per flow sheet, patient progressing towards goals as tolerated, plan of care reviewed with [unfilled] and family - pt and pt spouse at bedside, all concerns addressed at this time.    Andrea Croft RN

## 2024-04-11 NOTE — SUBJECTIVE & OBJECTIVE
Interval History:   NAEON.  Labs non-emergent, oxygenating well on RA.  ECHO performed yesterday w/o acute findings.  CVP of 3.  Troponin trending down, no interventions by cardiology scheduled.    Review of patient's allergies indicates:   Allergen Reactions    Ace inhibitors Swelling    Verapamil Other (See Comments)     Other reaction(s): Unknown    Povidone-iodine Itching     Current Facility-Administered Medications   Medication Frequency    0.9%  NaCl infusion (for blood administration) Q24H PRN    acetaminophen tablet 650 mg Q4H PRN    albuterol-ipratropium 2.5 mg-0.5 mg/3 mL nebulizer solution 3 mL Q4H PRN    aluminum-magnesium hydroxide-simethicone 200-200-20 mg/5 mL suspension 30 mL QID PRN    amiodarone tablet 200 mg Daily    aspirin chewable tablet 81 mg Daily    atorvastatin tablet 80 mg Daily    bisacodyL suppository 10 mg Daily PRN    clopidogreL tablet 75 mg Daily    dextrose 10% bolus 125 mL 125 mL PRN    dextrose 10% bolus 250 mL 250 mL PRN    doxazosin tablet 4 mg Q12H    fluticasone furoate-vilanteroL 100-25 mcg/dose diskus inhaler 1 puff Daily    fluticasone propionate 50 mcg/actuation nasal spray 50 mcg Daily    gabapentin capsule 100 mg PRN    glucagon (human recombinant) injection 1 mg PRN    glucose chewable tablet 16 g PRN    glucose chewable tablet 24 g PRN    heparin 25,000 units in dextrose 5% (100 units/ml) IV bolus from bag LOW INTENSITY nomogram - [facility] PRN    heparin 25,000 units in dextrose 5% (100 units/ml) IV bolus from bag LOW INTENSITY nomogram - [facility] PRN    heparin 25,000 units in dextrose 5% 250 mL (100 units/mL) infusion LOW INTENSITY nomogram - [facility] Continuous    hydrALAZINE tablet 50 mg TID    HYDROcodone-acetaminophen  mg per tablet 1 tablet Q6H PRN    HYDROcodone-acetaminophen 5-325 mg per tablet 1 tablet Q6H PRN    isosorbide mononitrate 24 hr tablet 30 mg Daily    melatonin tablet 6 mg Nightly PRN    metoprolol succinate (TOPROL-XL) 24 hr split tablet 12.5 mg Daily     multivitamin tablet Daily    mupirocin 2 % ointment BID    naloxone 0.4 mg/mL injection 0.02 mg PRN    nitroGLYCERIN SL tablet 0.4 mg Q5 Min PRN    ondansetron disintegrating tablet 8 mg Q8H PRN    pantoprazole EC tablet 40 mg Daily    paricalcitoL capsule 1 mcg Every Mon, Wed, Fri    polyethylene glycol packet 17 g BID PRN    predniSONE tablet 5 mg Daily    prochlorperazine injection Soln 5 mg Q6H PRN    simethicone chewable tablet 80 mg QID PRN    sodium chloride 0.9% flush 10 mL Q12H PRN    tacrolimus capsule 3 mg BID    tiotropium bromide 1.25 mcg/actuation inhaler 2 puff Daily    vitamin D 1000 units tablet 1,000 Units BID       Objective:     Vital Signs (Most Recent):  Temp: 98.2 °F (36.8 °C) (04/11/24 1200)  Pulse: 65 (04/11/24 1200)  Resp: 16 (04/11/24 1200)  BP: (!) 118/54 (04/11/24 1200)  SpO2: 99 % (04/11/24 1200) Vital Signs (24h Range):  Temp:  [97.2 °F (36.2 °C)-98.2 °F (36.8 °C)] 98.2 °F (36.8 °C)  Pulse:  [58-76] 65  Resp:  [13-39] 16  SpO2:  [96 %-100 %] 99 %  BP: ()/(40-81) 118/54     Weight: 84.4 kg (186 lb 1.1 oz) (04/10/24 1335)  Body mass index is 24.55 kg/m².  Body surface area is 2.08 meters squared.    I/O last 3 completed shifts:  In: 707.9 [I.V.:293.4; Blood:350; IV Piggyback:64.5]  Out: 3000 [Urine:200; Other:2800]     Physical Exam  Vitals and nursing note reviewed.   Constitutional:       General: He is not in acute distress.     Appearance: Normal appearance. He is not ill-appearing or toxic-appearing.   HENT:      Head: Normocephalic and atraumatic.      Nose: No congestion or rhinorrhea.      Mouth/Throat:      Mouth: Mucous membranes are moist.      Pharynx: No oropharyngeal exudate or posterior oropharyngeal erythema.   Eyes:      General: No scleral icterus.        Right eye: No discharge.         Left eye: No discharge.      Extraocular Movements: Extraocular movements intact.      Conjunctiva/sclera: Conjunctivae normal.   Cardiovascular:      Rate and Rhythm: Normal  rate. Rhythm irregular.      Heart sounds: No murmur heard.     No friction rub. No gallop.      Comments: MIRA AVF ++  Pulmonary:      Effort: Pulmonary effort is normal. No respiratory distress.      Breath sounds: No wheezing or rales.   Abdominal:      General: Bowel sounds are normal. There is no distension.      Palpations: Abdomen is soft.      Tenderness: There is no abdominal tenderness.   Musculoskeletal:         General: No swelling.      Cervical back: Normal range of motion and neck supple.      Right lower leg: No edema.      Left lower leg: No edema.   Skin:     General: Skin is warm and dry.   Neurological:      General: No focal deficit present.      Mental Status: He is alert and oriented to person, place, and time.          Significant Labs:  CBC:   Recent Labs   Lab 04/11/24  0335   WBC 6.63   RBC 2.76*   HGB 7.0*   HCT 22.2*      MCV 80*   MCH 25.4*   MCHC 31.5*     CMP:   Recent Labs   Lab 04/09/24  1157 04/09/24  2219 04/11/24  0335   GLU 92   < > 85   CALCIUM 8.4*   < > 7.7*   ALBUMIN 3.0*  --   --    PROT 6.8  --   --       < > 138   K 3.6   < > 3.6   CO2 20*   < > 25      < > 105   BUN 29*   < > 27*   CREATININE 5.8*   < > 5.0*   ALKPHOS 81  --   --    ALT 17  --   --    AST 16  --   --    BILITOT 0.6  --   --     < > = values in this interval not displayed.

## 2024-04-11 NOTE — ASSESSMENT & PLAN NOTE
On 4/11 pt c/o severe R ankle pain. Has hx of gout, states pain is similar to gout but worse. Has previously been on gout medications. But has not taken any prophylactic gout meds for past month    Ddx: Gout vs Septic Joint    - f/u Uric Acid  - f/u R ankle X-ray

## 2024-04-11 NOTE — CONSULTS
[facility] - Cardiac Medical ICU  Cardiology  Consult Note    Patient Name: Ibrahima Phelps  MRN: [id number]  Admission Date: 4/9/2024  Hospital Length of Stay: 1 days  Code Status: Full Code   Attending Provider: Temi Traylor MD   Consulting Provider: Jose Mathew MD  Primary Care Physician: Anatoliy Colindres MD  Principal Problem:Chest pain    Patient information was obtained from patient and chart     Consults  Subjective:     Chief Complaint:  chest pain      HPI:   Patient is a 69 yo male with a PMHx of paroxysmal atrial fibrillation on amiodarone 200mg qd and apixaban 5mg bid, tachy-loan syndrome, HFpEF with most recent LVEF 65%, CAD s/p PCI 2006 on plavix, HTN, HLD, aortic atherosclerosis, secondary hyperparathyroidism, diverticulosis, COPD, GERD, hypothyroidism, gout, a history of tobacco use, obesity, ESRD s/p failed kidney transplant x2 (1992, 2005) on chronic immunosuppressive medication now on hemodialysis who was admitted to [facility] on 04/09/2024 with excrutiating chest pain. Daughter and son in law in room state that mother was present was EMS was present and afib was reported. Unclear if patient was in RVR or afib at all since no rhythm strips were sent with the patient or uploaded to the media tab. Patient reports that he had 2.5 hrs (830 am to 11 am) of chest discomfort, he was diaphoretic, and it was substernal. Does not recall any radiation to jaw or left arm. It woke him up from sleep and reports he felt like effort/exercise worsened the discomfort    ECG with NSR with inferolateral ST depressions and non-specific changes. Patient underwent coronary angiography 02/2024 and was found to have multivessel CAD. CTS had declined patient for CABG and patient under PCI to the RCA on 02/28/2024. Mid LAD 70% + OM1/OM2 70% obstruction remained medically managed as thought was that culprit vessel (RCA) was addressed.   Trop on admission 0.035 --> 0.4 --> 1.9.      No new subjective & objective note has been  filed under this hospital service since the last note was generated.    Assessment and Plan:     * Chest pain  Patient is a 67 yo male with a PMHx of paroxysmal atrial fibrillation on amiodarone 200mg qd and apixaban 5mg bid, tachy-loan syndrome, HFpEF with most recent LVEF 65%, CAD s/p PCI 2006 on plavix, HTN, HLD, aortic atherosclerosis, secondary hyperparathyroidism, diverticulosis, COPD, GERD, hypothyroidism, gout, a history of tobacco use, obesity, ESRD s/p failed kidney transplant x2 (1992, 2005) on chronic immunosuppressive medication now on hemodialysis who was admitted to [facility] on 04/09/2024 with excrutiating chest pain. ECG with NSR with inferolateral ST depressions and non-specific changes. Patient underwent coronary angiography 02/2024 and was found to have multivessel CAD. CTS had declined patient for CABG and patient under PCI to the RCA on 02/28/2024. Mid LAD 70% + OM1/OM2 70% obstruction remained medically managed as thought was that culprit vessel (RCA) was addressed. EF 40-45 without WMA    Trop on admission 0.035 --> 0.4 --> 1.9.      - Hold apixaban for now, stop heparin drip after 48 hours, then can restart apixaban once off drip  - Continue high intensity statin for one week then stop  - Continue plavix  - Continue beta blocker  - Start isosorbide dinitrate/hydralazine HCl 1 tablet dosing qd, if tolerating can add entresto  - STAT ECG if patient has chest discomfort and call Cardiology   - Monitor telemetry    Prophylactic immunotherapy  Failed kidney transplant x 2  On tacro + pred per transplant nephrology    Paroxysmal atrial fibrillation  Stop heparin gtt after 48 hours  Restart home Eliquis once heparin stopped      Mixed hyperlipidemia  Continue high intensity statin          VTE Risk Mitigation (From admission, onward)           Ordered     heparin 25,000 units in dextrose 5% (100 units/ml) IV bolus from bag LOW INTENSITY nomogram - [facility]  As needed (PRN)        Question:  Heparin  Infusion Adjustment (DO NOT MODIFY ANSWER)  Answer:  [email or web address]\Images\Pharmacy\HeparinInfusions\heparin LOW INTENSITY nomogram for [facility] [id number]    04/09/24 1853     heparin 25,000 units in dextrose 5% (100 units/ml) IV bolus from bag LOW INTENSITY nomogram - [facility]  As needed (PRN)        Question:  Heparin Infusion Adjustment (DO NOT MODIFY ANSWER)  Answer:  [email or web address]\Images\Pharmacy\HeparinInfusions\heparin LOW INTENSITY nomogram for [facility] [id number]    04/09/24 1853     heparin 25,000 units in dextrose 5% 250 mL (100 units/mL) infusion LOW INTENSITY nomogram - [facility]  Continuous        Question:  Begin at (units/kg/hr)  Answer:  12    04/09/24 1853     IP VTE HIGH RISK PATIENT  Once         04/09/24 1536     Place sequential compression device  Until discontinued         04/09/24 1536                    Thank you for your consult. I will sign off. Please contact us if you have any additional questions.    Jose Mathew MD  Cardiology   [facility] - Cardiac Medical ICU

## 2024-04-11 NOTE — PROGRESS NOTES
[facility]  Nephrology  Progress Note    Patient Name: Ibrahima Phelps  MRN: [id number]  Admission Date: 4/9/2024  Hospital Length of Stay: 1 days  Attending Provider: Temi Traylor MD   Primary Care Physician: Anatoliy Colindres MD  Principal Problem:Chest pain    Subjective:     HPI: Ibrahima Phelps is a 69 yo male with CAD s/p PCI, HTN, HLD, HFpEF, AFib, hypothyroidism, and ESRD s/p KTx x 2 (remains on immunosuppressant medications) who presents to the ED via EMS on 4/9 for evaluation of CP located to R chest.  He described the pain as burning associated with SOB, nausea and dizziness.  Upon arrival to the ED he was found to be in AFib with RVR which self converted and symptoms improved with GI cocktail.  His last HD session was on Saturday and was scheduled to have today but reported to the ED instead.  Chemistries without emergent electrolyte abnormalities, trop 0.035, BNP 3075.  He denies any complaints at time of consultation.  Nephrology has been consulted for ESRD management while IP.      Interval History:   NAEON.  Labs non-emergent, oxygenating well on RA.  ECHO performed yesterday w/o acute findings.  CVP of 3.  Troponin trending down, no interventions by cardiology scheduled.    Review of patient's allergies indicates:   Allergen Reactions    Ace inhibitors Swelling    Verapamil Other (See Comments)     Other reaction(s): Unknown    Povidone-iodine Itching     Current Facility-Administered Medications   Medication Frequency    0.9%  NaCl infusion (for blood administration) Q24H PRN    acetaminophen tablet 650 mg Q4H PRN    albuterol-ipratropium 2.5 mg-0.5 mg/3 mL nebulizer solution 3 mL Q4H PRN    aluminum-magnesium hydroxide-simethicone 200-200-20 mg/5 mL suspension 30 mL QID PRN    amiodarone tablet 200 mg Daily    aspirin chewable tablet 81 mg Daily    atorvastatin tablet 80 mg Daily    bisacodyL suppository 10 mg Daily PRN    clopidogreL tablet 75 mg Daily    dextrose 10% bolus 125 mL 125  mL PRN    dextrose 10% bolus 250 mL 250 mL PRN    doxazosin tablet 4 mg Q12H    fluticasone furoate-vilanteroL 100-25 mcg/dose diskus inhaler 1 puff Daily    fluticasone propionate 50 mcg/actuation nasal spray 50 mcg Daily    gabapentin capsule 100 mg PRN    glucagon (human recombinant) injection 1 mg PRN    glucose chewable tablet 16 g PRN    glucose chewable tablet 24 g PRN    heparin 25,000 units in dextrose 5% (100 units/ml) IV bolus from bag LOW INTENSITY nomogram - [facility] PRN    heparin 25,000 units in dextrose 5% (100 units/ml) IV bolus from bag LOW INTENSITY nomogram - [facility] PRN    heparin 25,000 units in dextrose 5% 250 mL (100 units/mL) infusion LOW INTENSITY nomogram - [facility] Continuous    hydrALAZINE tablet 50 mg TID    HYDROcodone-acetaminophen  mg per tablet 1 tablet Q6H PRN    HYDROcodone-acetaminophen 5-325 mg per tablet 1 tablet Q6H PRN    isosorbide mononitrate 24 hr tablet 30 mg Daily    melatonin tablet 6 mg Nightly PRN    metoprolol succinate (TOPROL-XL) 24 hr split tablet 12.5 mg Daily    multivitamin tablet Daily    mupirocin 2 % ointment BID    naloxone 0.4 mg/mL injection 0.02 mg PRN    nitroGLYCERIN SL tablet 0.4 mg Q5 Min PRN    ondansetron disintegrating tablet 8 mg Q8H PRN    pantoprazole EC tablet 40 mg Daily    paricalcitoL capsule 1 mcg Every Mon, Wed, Fri    polyethylene glycol packet 17 g BID PRN    predniSONE tablet 5 mg Daily    prochlorperazine injection Soln 5 mg Q6H PRN    simethicone chewable tablet 80 mg QID PRN    sodium chloride 0.9% flush 10 mL Q12H PRN    tacrolimus capsule 3 mg BID    tiotropium bromide 1.25 mcg/actuation inhaler 2 puff Daily    vitamin D 1000 units tablet 1,000 Units BID       Objective:     Vital Signs (Most Recent):  Temp: 98.2 °F (36.8 °C) (04/11/24 1200)  Pulse: 65 (04/11/24 1200)  Resp: 16 (04/11/24 1200)  BP: (!) 118/54 (04/11/24 1200)  SpO2: 99 % (04/11/24 1200) Vital Signs (24h Range):  Temp:  [97.2 °F (36.2 °C)-98.2 °F (36.8 °C)] 98.2 °F (36.8  °C)  Pulse:  [58-76] 65  Resp:  [13-39] 16  SpO2:  [96 %-100 %] 99 %  BP: ()/(40-81) 118/54     Weight: 84.4 kg (186 lb 1.1 oz) (04/10/24 1335)  Body mass index is 24.55 kg/m².  Body surface area is 2.08 meters squared.    I/O last 3 completed shifts:  In: 707.9 [I.V.:293.4; Blood:350; IV Piggyback:64.5]  Out: 3000 [Urine:200; Other:2800]     Physical Exam  Vitals and nursing note reviewed.   Constitutional:       General: He is not in acute distress.     Appearance: Normal appearance. He is not ill-appearing or toxic-appearing.   HENT:      Head: Normocephalic and atraumatic.      Nose: No congestion or rhinorrhea.      Mouth/Throat:      Mouth: Mucous membranes are moist.      Pharynx: No oropharyngeal exudate or posterior oropharyngeal erythema.   Eyes:      General: No scleral icterus.        Right eye: No discharge.         Left eye: No discharge.      Extraocular Movements: Extraocular movements intact.      Conjunctiva/sclera: Conjunctivae normal.   Cardiovascular:      Rate and Rhythm: Normal rate. Rhythm irregular.      Heart sounds: No murmur heard.     No friction rub. No gallop.      Comments: MIRA AVF ++  Pulmonary:      Effort: Pulmonary effort is normal. No respiratory distress.      Breath sounds: No wheezing or rales.   Abdominal:      General: Bowel sounds are normal. There is no distension.      Palpations: Abdomen is soft.      Tenderness: There is no abdominal tenderness.   Musculoskeletal:         General: No swelling.      Cervical back: Normal range of motion and neck supple.      Right lower leg: No edema.      Left lower leg: No edema.   Skin:     General: Skin is warm and dry.   Neurological:      General: No focal deficit present.      Mental Status: He is alert and oriented to person, place, and time.          Significant Labs:  CBC:   Recent Labs   Lab 04/11/24  0335   WBC 6.63   RBC 2.76*   HGB 7.0*   HCT 22.2*      MCV 80*   MCH 25.4*   MCHC 31.5*     CMP:   Recent Labs    Lab 04/09/24  1157 04/09/24  2219 04/11/24  0335   GLU 92   < > 85   CALCIUM 8.4*   < > 7.7*   ALBUMIN 3.0*  --   --    PROT 6.8  --   --       < > 138   K 3.6   < > 3.6   CO2 20*   < > 25      < > 105   BUN 29*   < > 27*   CREATININE 5.8*   < > 5.0*   ALKPHOS 81  --   --    ALT 17  --   --    AST 16  --   --    BILITOT 0.6  --   --     < > = values in this interval not displayed.          Assessment/Plan:     Cardiac/Vascular  * Chest pain  -Resolved @ time of evaluation  -Trop 0.035 --> 4.5-->3.3   -on ACS protocol, cardiology following      Renal/  ESRD (end stage renal disease) on dialysis  ESRD on iHD TTS  [facility]  ?[facility]  MIRA AVF  + residual renal fx    Plan/Recommendations:  -No emergent need for RRT today  -will plan for next dialysis session tomorrow.  -stable for discharge per primary team, but unfortunately unable to d/c due to inability to provide dialysis today because of high volume of HD treatments already scheduled.  -strict I/O's  -renal diet when advanced  -defer EPO to HD unit        Noe Shankar NP  Nephrology  [facility]

## 2024-04-11 NOTE — PROGRESS NOTES
[facility] - [facility]  Critical Care Medicine  Progress Note    Patient Name: Ibrahima Phelps  MRN: [id number]  Admission Date: 4/9/2024  Hospital Length of Stay: 1 days  Code Status: Full Code  Attending Provider: Temi Traylor MD  Primary Care Provider: Anatoliy Colindres MD   Principal Problem: Chest pain    Subjective:     HPI:  Mr. Phelps is a 68 year old male with extensive PMH notable for pAF on amiodarone 200mg qd and apixaban 5mg bid, tachy-loan syndrome, HFpEF with most recent LVEF 65%, CAD s/p PCI 2006 on plavix, HTN, HLD, aortic atherosclerosis, secondary hyperparathyroidism, diverticulosis, COPD, GERD, hypothyroidism, gout, a history of tobacco use, obesity, ESRD s/p failed kidney transplant x2 (1992, 2005) on chronic immunosuppressive medication now on hemodialysis who presented to [facility] ED with chest pain. Per chart review, EMS activated and initial rhythm AF. Patient endorsed 2.5 hours of chest discomfort, with associated diaphoresis. Denies radiation. Of note, patient recently underwent coronary angiography in Feb 2024 and was found to have multivessel CAD. CTS evaluated and had declined patient for CABG and patient under PCI to the RCA on 02/28/2024. Mid LAD 70% + OM1/OM2 70% obstruction.     In the emergency department: ECG with inferolateral ST depressions. Initial troponin 0.035 --> 0.4 --> 1.9. BNP > 3000. BUN/Cr 29/5.8. WBC 6. Stable H/H at 8.2/27. Admitted to hospital medicine with cardiology consulted. He was started on ACS protocol at the recommendation of cardiology.     Overnight 04/09 rapid response activated for respiratory distress and flash pulmonary edema. Wife at bedside states he became acutely short of breath. Pt tripoding at bedside, satting in the 90s on nasal cannula, BP with systolic 170s. Pt does produce some urine still at baseline. Last dialysis was 3 days prior. History limited 2/2 patient respiratory distress. Patient transferred to the MICU for NIPPV and iHD.      Hospital/ICU Course:  In the emergency department: ECG with inferolateral ST depressions. Initial troponin 0.035 --> 0.4 --> 1.9. BNP > 3000. BUN/Cr 29/5.8. WBC 6. Stable H/H at 8.2/27. Admitted to hospital medicine with cardiology consulted. He was started on ACS protocol at the recommendation of cardiology. Troponin trended until stable    Overnight 04/09 rapid response activated for respiratory distress and flash pulmonary edema. Wife at bedside states he became acutely short of breath. Pt tripoding at bedside, satting in the 90s on nasal cannula, BP with systolic 170s. Pt transferred to MICU for NiPPV and iHD, respiratory status improved without being placed on NiPPV. Now on room air. Dialyzed last night, 2L taken off, patient currently without pain or shortness of breath. On room air, no pressors. Stepped down to floor 4/10. Pt c/o severe R ankle pain on 4/11. Uric acid and R ankle X-ray ordered.    Interval History/Significant Events: NAEO. Stable for stepdown. Will f/u with daily Cardiology recs. Pt c/o severe R ankle pain. Has hx of gout, but no longer takes gout medication. Will order Uric Acid and R ankle X-ray. Hopeful pt will get dialysis today as well.     Review of Systems   Constitutional:  Negative for activity change, appetite change, chills, diaphoresis and fever.   HENT:  Negative for congestion and sore throat.    Respiratory:  Negative for cough, shortness of breath and wheezing.    Cardiovascular:  Negative for chest pain.   Musculoskeletal:  Positive for joint swelling (R ankle).   Neurological:  Negative for dizziness, light-headedness and headaches.     Objective:     Vital Signs (Most Recent):  Temp: 97.7 °F (36.5 °C) (04/10/24 1900)  Pulse: 64 (04/11/24 0501)  Resp: 13 (04/11/24 0500)  BP: (!) 115/53 (04/11/24 0400)  SpO2: 99 % (04/11/24 0500) Vital Signs (24h Range):  Temp:  [97.2 °F (36.2 °C)-98.2 °F (36.8 °C)] 97.7 °F (36.5 °C)  Pulse:  [58-75] 64  Resp:  [13-39] 13  SpO2:  [96 %-99  %] 99 %  BP: ()/(40-72) 115/53   Weight: 84.4 kg (186 lb 1.1 oz)  Body mass index is 24.55 kg/m².      Intake/Output Summary (Last 24 hours) at 4/11/2024 0836  Last data filed at 4/11/2024 0401  Gross per 24 hour   Intake 214.62 ml   Output 200 ml   Net 14.62 ml          Physical Exam  Vitals and nursing note reviewed.   Constitutional:       General: He is not in acute distress.     Appearance: Normal appearance. He is normal weight. He is not ill-appearing or toxic-appearing.   HENT:      Head: Normocephalic and atraumatic.      Mouth/Throat:      Mouth: Mucous membranes are moist.   Eyes:      General: No scleral icterus.        Right eye: No discharge.         Left eye: No discharge.      Conjunctiva/sclera: Conjunctivae normal.   Cardiovascular:      Rate and Rhythm: Regular rhythm. Tachycardia present.      Heart sounds: No murmur heard.  Pulmonary:      Effort: Pulmonary effort is normal. No respiratory distress.      Breath sounds: Normal breath sounds. No wheezing or rales.   Abdominal:      General: Abdomen is flat. There is no distension.   Musculoskeletal:         General: Tenderness present.      Right lower leg: No edema.      Left lower leg: No edema.      Right ankle: No swelling, deformity or ecchymosis. Tenderness present over the lateral malleolus. Decreased range of motion.      Left ankle: Normal.      Comments: Severe R ankle tenderness to palpation at R lateral malleolus    Skin:     General: Skin is warm and dry.      Coloration: Skin is not pale.      Findings: No erythema.   Neurological:      Mental Status: He is alert and oriented to person, place, and time.   Psychiatric:         Mood and Affect: Mood normal.         Behavior: Behavior normal.            Vents:     Lines/Drains/Airways       Peripheral Intravenous Line  Duration                  Hemodialysis AV Fistula Left upper arm -- days         Hemodialysis AV Fistula 02/19/24 0924 Left upper arm 51 days         Midline  Catheter - Single Lumen 04/10/24 0030 Right basilic vein (medial side of arm) 1 day         Peripheral IV - Single Lumen 04/10/24 0230 20 G Right Antecubital 1 day         Peripheral IV - Single Lumen 04/10/24 1000 22 G Distal;Posterior;Right Forearm <1 day                  Significant Labs:    CBC/Anemia Profile:  Recent Labs   Lab 04/10/24  0123 04/10/24  0605 04/11/24  0335   WBC 11.54 9.38  9.38 6.63   HGB 6.8* 7.7*  7.7* 7.0*   HCT 22.2* 24.6*  24.6* 22.2*    232  232 211   MCV 83 82  82 80*   RDW 17.7* 17.2*  17.2* 17.7*        Chemistries:  Recent Labs   Lab 04/09/24  1157 04/09/24  2219 04/10/24  0605 04/11/24  0335    141 138 138   K 3.6 4.2 4.1 3.6    107 105 105   CO2 20* 20* 25 25   BUN 29* 31* 18 27*   CREATININE 5.8* 5.9* 3.7* 5.0*   CALCIUM 8.4* 8.3* 8.1* 7.7*   ALBUMIN 3.0*  --   --   --    PROT 6.8  --   --   --    BILITOT 0.6  --   --   --    ALKPHOS 81  --   --   --    ALT 17  --   --   --    AST 16  --   --   --    MG  --  2.1 1.9 2.4   PHOS  --  2.8 1.9* 2.4*       All pertinent labs within the past 24 hours have been reviewed.    Significant Imaging:  I have reviewed all pertinent imaging results/findings within the past 24 hours.    ABG  Recent Labs   Lab 04/09/24 2214   PH 7.337*   PO2 75*   PCO2 41.4   HCO3 22.2*   BE -4*     Assessment/Plan:     Pulmonary  Flash pulmonary edema  See AHRF    Acute hypoxemic respiratory failure  Patient with history of COPD and ESRD stepping up to MICU for acute SOB, concern for Flash pulmonary edema  CXR with worsening edema  BNP increased to 3000 from 2000  Patient notably tachypneic on exam  Oxygen reqs increased to 6 L NC from RA  Unable to tolerate re-breather    -Bipap 148  -Precedex for anxiety with positive pressure support  -Lasix IV+infusion (patient not anuric), pending dialysis for volume control  -nephrology consulted    Cardiac/Vascular  * Chest pain  See CAD    Atrial fibrillation with RVR  -Holding eliquis while on  ACS protocol  -Continue home amiodarone, metoprolol  -plan for urgent dialysis    Chronic heart failure with preserved ejection fraction  Patient is identified as having Diastolic (HFpEF) heart failure that is Chronic.    Echo 2/25/24    Left Ventricle: There is normal systolic function with a visually estimated ejection fraction of 60 - 65%. Grade II diastolic dysfunction. Elevated left ventricular filling pressure.    Right Ventricle: Normal right ventricular cavity size. Wall thickness is normal. Right ventricle wall motion  is normal. Systolic function is normal.    Left Atrium: Left atrium is moderately dilated.    Right Atrium: Right atrium is moderately dilated.    Aortic Valve: The aortic valve is a trileaflet valve. There is moderate aortic valve sclerosis. There is moderate annular calcification present. There is mild to moderate aortic regurgitation. There appers to be mobile echodensoty attached to the base of the non coronary leaflet that is likely nodular calcificaiton and is tthe site of origin of Aortic regurgitation. This appears unchanged from pror TTE. AV leaflets appears thickened in some views cannot exlude presence of endocarditis (clip # 5).    Aortic Valve: Mildly restricted motion. There is mild stenosis. Aortic valve area by VTI is 1.62 cm². Aortic valve peak velocity is 2.62 m/s. Mean gradient is 14 mmHg. The dimensionless index is 0.47.    Mitral Valve: There is moderate mitral annular calcification present. There is mild to moderate regurgitation.    Aorta: Aortic root is normal in size measuring 3.61 cm. Ascending aorta is normal measuring 2.80 cm.    Pulmonary Artery: The estimated pulmonary artery systolic pressure is 49 mmHg.    IVC/SVC: Intermediate venous pressure at 8 mmHg.    BNP 3075 (2000)    - Continue metoprolol  - Monitor on telemetry.  - Monitor strict Is&Os and daily weights. Place on fluid restriction of 1.5 L.   - Continue to stress to patient importance of self efficacy  and  on diet for CHF.  - Volume management with PRN lasix and HD    Paroxysmal atrial fibrillation  -Continue heparin gtt per ACS protocol  -Holding eliquis will resume after ACS complete    Mixed hyperlipidemia  Continue high intensity statin    CAD (coronary artery disease)  Patient underwent coronary angiography 02/2024 and was found to have multivessel CAD. CTS had declined patient for CABG and patient under PCI to the RCA on 02/28/2024. Mid LAD 70% + OM1/OM2 70% obstruction remained medically managed as thought was that culprit vessel (RCA) was addressed.   Trop on admission 0.035 --> 0.4 --> 1.9-->3.78  Cardiology consulted, likely type II demand    - Trend trop to peak  - follow up TTE, ordered  - continue ACS protocol  - continue high intensity statin  - continue beta blocker  - NPO after midnight for possible repeat coronary angiogram per Cardiology  - NTG SL 0.4  q 5 min PRN for chest discomfort  - Cardiac telemetry    Primary hypertension  BP on evaluation 190/70    -continue home nifedipine, hydralazine  -initiated nicardipine infusion, goal BP <180 systolic    Renal/  ESRD (end stage renal disease) on dialysis  TThS schedule  Duration: 3.5 hrs  Access: LUE   Residual Renal Function: minimal  Last HD prior to admission: 4/6    - Nephrology consulted  - Will dialyze for flash pulmonary edema  - metabolic profile daily    H/O kidney transplant  Chronic immunosuppression, prophylactic immunotherapy    - Continue home prednisone and tacro taper    Immunology/Multi System  Prophylactic immunotherapy  Failed kidney transplant x 2  On tacro + pred per transplant nephrology    GI  GERD (gastroesophageal reflux disease)  -continue protonix    Orthopedic  Right ankle pain  On 4/11 pt c/o severe R ankle pain. Has hx of gout, states pain is similar to gout but worse. Has previously been on gout medications. But has not taken any prophylactic gout meds for past month    Ddx: Gout vs Septic Joint    - f/u  Uric Acid  - f/u R ankle X-ray        Critical Care Daily Checklist:    A: Awake: RASS Goal/Actual Goal: RASS Goal: 0-->alert and calm  Actual:     B: Spontaneous Breathing Trial Performed?     C: SAT & SBT Coordinated?  N/A                      D: Delirium: CAM-ICU Overall CAM-ICU: Negative   E: Early Mobility Performed? Yes   F: Feeding Goal:    Status:     Current Diet Order   Procedures    Diet Renal Standard Tray     Order Specific Question:   Tray type:     Answer:   Standard Tray      AS: Analgesia/Sedation none   T: Thromboembolic Prophylaxis Heparin gtt   H: HOB > 300 Yes   U: Stress Ulcer Prophylaxis (if needed) yes   G: Glucose Control yes   B: Bowel Function     I: Indwelling Catheter (Lines & Hamilton) Necessity Midline, PIV   D: De-escalation of Antimicrobials/Pharmacotherapies none    Plan for the day/ETD Stepdown, Dialysis, Cards recs, ankle pain workup    Code Status:  Family/Goals of Care: Full Code  updated       Critical secondary to Patient has a condition that poses threat to life and bodily function: Severe Respiratory Distress      Critical care was time spent personally by me on the following activities: development of treatment plan with patient or surrogate and bedside caregivers, discussions with consultants, evaluation of patient's response to treatment, examination of patient, ordering and performing treatments and interventions, ordering and review of laboratory studies, ordering and review of radiographic studies, pulse oximetry, re-evaluation of patient's condition. This critical care time did not overlap with that of any other provider or involve time for any procedures.     Tylor Bowling,   Critical Care Medicine  [facility] - Cardiac Medical ICU

## 2024-04-11 NOTE — ASSESSMENT & PLAN NOTE
ESRD on iHD TTS  [facility]  Dr. Gambino  ?EDW  MIRA AVF  + residual renal fx    Plan/Recommendations:  -No emergent need for RRT today  -will plan for next dialysis session tomorrow.  -stable for discharge per primary team, but unfortunately unable to d/c due to inability to provide dialysis today because of high volume of HD treatments already scheduled.  -strict I/O's  -renal diet when advanced  -defer EPO to HD unit

## 2024-04-11 NOTE — PT/OT/SLP PROGRESS
Physical Therapy Evaluation and Discharge Note    Patient Name:  Ibrahima Phelps   MRN:  [id number]  Admitting Diagnosis:  Chest pain   Recent Surgery: * No surgery found *    Admit Date: 4/9/2024  Length of Stay: 1 days    Recommendations:     Discharge Recommendations:  No Therapy Indicated  Discharge Equipment Recommendations: none   Barriers to discharge: None    Assessment:     Ibrahima Phelps is a 68 y.o. male admitted with a medical diagnosis of Chest pain. Pt found alert and cooperative with therapy. Vitals were stable throughout session. Pt is independent with ADLS and functional mobility. At this time, patient is functioning at their prior level of function and does not require further acute PT services.     Plan:     During this hospitalization, patient does not require further acute PT services.  Please re-consult if situation changes.      Subjective   Communicated with RN prior to session.  Patient found up in chair with telemetry, blood pressure cuff, pulse ox (continuous), peripheral IV upon PT entry to room, agreeable to evaluation. Ibrahima Phelps's wife present during session.    Chief Complaint: P! In right ankle  Patient/Family Comments/goals: Wishes to be discharged home.   Pain/Comfort:  Pain Rating 1: 3/10  Location - Side 1: Right  Location - Orientation 1: generalized  Location 1:  (ankle)  Pain Addressed 1: Reposition, Distraction  Pain Rating Post-Intervention 1: 3/10    Living Environment:  Patient lives with wife in a single family home with 1 LOBO.   Prior Level of Function: Patient reports being independent with mobility & with ADLs. Patient uses DME as follows: none. DME owned (not currently used):  rollator .  Roles/Repsonsibilities: Hand Dominance: right Work: no. Drive: yes. Managing Medicines/Managing Home: yes.     Objective:   Patient found with: telemetry, blood pressure cuff, pulse ox (continuous), peripheral IV   General Precautions: Standard, fall   Orthopedic Precautions:N/A  "  Braces: N/A   Oxygen Device: Room Air  Vitals: BP (!) 118/54 (BP Location: Right leg, Patient Position: Lying)   Pulse 66   Temp 98.2 °F (36.8 °C) (Oral)   Resp 19   Ht 6' 1" (1.854 m)   Wt 84.4 kg (186 lb 1.1 oz)   SpO2 99%   BMI 24.55 kg/m²     Exams:  Cognition:   Oriented X 4 and Alert  Command following: Follows multistep  commands  Fluency: clear/fluent  Hearing: Intact  Vision:  Intact visual fields    RLE ROM: WFL  RLE Strength: WFL  LLE ROM: WFL  LLE Strength: WFL    Outcome Measures:  AM-PAC 6 CLICK MOBILITY  Turning over in bed (including adjusting bedclothes, sheets and blankets)?: 4  Sitting down on and standing up from a chair with arms (e.g., wheelchair, bedside commode, etc.): 4  Moving from lying on back to sitting on the side of the bed?: 4  Moving to and from a bed to a chair (including a wheelchair)?: 4  Need to walk in hospital room?: 4  Climbing 3-5 steps with a railing?: 4  Basic Mobility Total Score: 24     Functional Mobility:  Additional Staff Present: OT, Student PT, and Supervising PT    Transfers:   Sit <> Stand Transfer: independence with no assistive device       Gait:   Patient ambulated: 216ft   Patient required: independent  Patient used:  no assistive device   Gait Pattern observed: reciprocal gait  Normal gait speed for average healthy adult: 1.2m/s-1.4m/s  Gait Deviation(s): antalgic gait  Impairments due to: pain in R ankle due to hx of gout       Therapeutic Activities, Exercises, & Education:   Pt educated on pt role/Poc.   Pt verbalized understanding.      Patient left up in chair with all lines intact, call button in reach, and RN notified.    GOALS: None identified at this time    History:     Past Medical History:   Diagnosis Date    Atrial fibrillation     CAD (coronary artery disease) 2006    MI in 2006    CHF (congestive heart failure) 2017    CKD (chronic kidney disease) stage 3, GFR 30-59 ml/min     Dr. Latif.  in transplanted kidney    CKD (chronic kidney " "disease) stage 5, GFR less than 15 ml/min 02/02/2024    COVID-19 02/07/2023    Diverticulosis     Hyperlipidemia     Hypertension     Keloid cicatrix     Metabolic bone disease     Other emphysema 10/01/2019    Pericarditis     S/P kidney transplant 1992    x2 (1992 & 2005),    Thrombocytopenia     Thyroid disease     Tobacco use 09/05/2014       Past Surgical History:   Procedure Laterality Date    AV FISTULA PLACEMENT Left 12/18/2023    Procedure: CREATION, AV FISTULA;  Surgeon: JUANI Melendez III, MD;  [facility];  Service: Vascular;  Laterality: Left;  LUE AVF creation vs AVG insertion    CARDIOVERSION  04/30/15    CHOLECYSTECTOMY      COLONOSCOPY  April 20, 2011    Diverticulosis, repeat recommended in 3 yrs., repeat colonoscopy 2014 revealed 2 polyps.  He should return in 5 years.    COLONOSCOPY N/A 3/13/2020    Procedure: COLONOSCOPY;  Surgeon: Chon Casper MD;  Location: [facility] (4TH FLR);  Service: Endoscopy;  Laterality: N/A;  ok to hold coumadin x5days-see telephone encounter 2/4/20-tb    COLONOSCOPY N/A 2/4/2021    Procedure: COLONOSCOPY;  Surgeon: Chon Casper MD;  Location: [facility] (4TH FLR);  Service: Endoscopy;  Laterality: N/A;  Eliquis - per Dr. GAVIN chua to hold x 2 days and "restarted asap"- ERW  last prep "poor", [id number] ordered/ Pt requests Dr. Casper  prep ins. mailed - COVID screening on 2/1/21 PCW- ERW    COLONOSCOPY N/A 3/3/2021    Procedure: COLONOSCOPY;  Surgeon: Chon Casper MD;  Location: [facility] (4TH FLR);  Service: Endoscopy;  Laterality: N/A;  Patient with his second poor bowel pre and has poor prep today.  If patient not intersted or can't get colonoscopy tomorrow will need constipation bowel prep and will need to restart his Eliquis today.Thanks,Chon     per Dr Blunt-ok to hold Eliquis 2 days prior      COVID     CORONARY ANGIOGRAPHY N/A 2/28/2024    Procedure: ANGIOGRAM, CORONARY ARTERY;  Surgeon: Tylor Lincoln MD;  Location: [facility] " CATH LAB;  Service: Cardiology;  Laterality: N/A;    CORONARY ANGIOPLASTY WITH STENT PLACEMENT  9/13/2006    FISTULOGRAM N/A 2/19/2024    Procedure: Fistulogram;  Surgeon: JUANI Melendez III, MD;  Location: [facility] CATH LAB;  Service: Peripheral Vascular;  Laterality: N/A;    IRRIGATION AND DEBRIDEMENT Right 8/30/2023    Procedure: IRRIGATION AND DEBRIDEMENT, RIGHT MIDDLE FINGER;  Surgeon: Martin Larsen MD;  Location: [facility] OR [facility] FLR;  Service: Orthopedics;  Laterality: Right;    KIDNEY TRANSPLANT  2005    LEFT HEART CATHETERIZATION N/A 2/26/2024    Procedure: Left heart cath;  Surgeon: Tylor Lincoln MD;  Location: [facility] CATH LAB;  Service: Cardiology;  Laterality: N/A;    PARATHYROIDECTOMY      PERCUTANEOUS TRANSLUMINAL ANGIOPLASTY OF ARTERIOVENOUS FISTULA Left 2/19/2024    Procedure: PTA, AV FISTULA;  Surgeon: JUANI Melendez III, MD;  Location: [facility] CATH LAB;  Service: Peripheral Vascular;  Laterality: Left;    STENT, DRUG ELUTING, SINGLE VESSEL, CORONARY N/A 2/28/2024    Procedure: Stent, Drug Eluting, Single Vessel, Coronary;  Surgeon: Tylor Lincoln MD;  Location: [facility] CATH LAB;  Service: Cardiology;  Laterality: N/A;    TREATMENT OF CARDIAC ARRHYTHMIA N/A 3/28/2022    Procedure: CARDIOVERSION;  Surgeon: Migue Blunt MD;  Location: [facility] EP LAB;  Service: Cardiology;  Laterality: N/A;  AF, DCC, MAC, GP, 3 PREP*RODRIGO deferred pt 100% compliant*       Time Tracking:     PT Received On: 04/11/24  PT Start Time: 1425     PT Stop Time: 1444  PT Total Time (min): 19 min     Billable Minutes: Evaluation 12

## 2024-04-11 NOTE — PLAN OF CARE
Problem: Physical Therapy  Goal: Physical Therapy Goal  Description: Patient requires no skilled acute PT services due to being independent with functional mobility.       Outcome: Met

## 2024-04-11 NOTE — ASSESSMENT & PLAN NOTE
Patient is failed kidney transplant 1992 and again in 2005, now ESRD on HD via a LUE AVF at [facility] iHD TTS schedule. He remains on immunosuppression with tacrolimus 3 mg BID and prednisone 5 mg daily, and denies pain over the allograft site.     Recommendations  -Continue Tacrolimus and check levels daily. Will adjust dose as needed.  -Continue home prednisone 5 mg daily

## 2024-04-11 NOTE — HPI
Mr. Phelps is a 68 year old male with extensive PMH notable for pAF on amiodarone 200mg qd and apixaban 5mg bid, tachy-loan syndrome, HFpEF with most recent LVEF 65%, CAD s/p PCI 2006 on plavix, HTN, HLD, aortic atherosclerosis, secondary hyperparathyroidism, diverticulosis, COPD, GERD, hypothyroidism, gout, a history of tobacco use, obesity, ESRD s/p failed kidney transplant x2 (1992, 2005) on chronic immunosuppressive medication now on hemodialysis who presented to [facility] ED with chest pain. In the emergency department: ECG with inferolateral ST depressions. Initial troponin 0.035 --> 0.4 --> 1.9. BNP > 3000. BUN/Cr 29/5.8. WBC 6. Stable H/H at 8.2/27. He was started on ACS protocol.     Overnight 04/09 rapid response activated for respiratory distress and flash pulmonary edema. Pt tripoding at bedside, satting in the 90s on nasal cannula, BP with systolic 170s. Pt transferred to MICU for NiPPV and iHD, respiratory status improved. KTS consulted for assistance in his IS medications.

## 2024-04-11 NOTE — ASSESSMENT & PLAN NOTE
-Resolved @ time of evaluation  -Trop 0.035 --> 4.5-->3.3   -on ACS protocol, cardiology following

## 2024-04-11 NOTE — ASSESSMENT & PLAN NOTE
Patient is a 67 yo male with a PMHx of paroxysmal atrial fibrillation on amiodarone 200mg qd and apixaban 5mg bid, tachy-loan syndrome, HFpEF with most recent LVEF 65%, CAD s/p PCI 2006 on plavix, HTN, HLD, aortic atherosclerosis, secondary hyperparathyroidism, diverticulosis, COPD, GERD, hypothyroidism, gout, a history of tobacco use, obesity, ESRD s/p failed kidney transplant x2 (1992, 2005) on chronic immunosuppressive medication now on hemodialysis who was admitted to [facility] on 04/09/2024 with excrutiating chest pain. ECG with NSR with inferolateral ST depressions and non-specific changes. Patient underwent coronary angiography 02/2024 and was found to have multivessel CAD. CTS had declined patient for CABG and patient under PCI to the RCA on 02/28/2024. Mid LAD 70% + OM1/OM2 70% obstruction remained medically managed as thought was that culprit vessel (RCA) was addressed. EF 40-45 without WMA    Trop on admission 0.035 --> 0.4 --> 1.9.      - Hold apixaban for now, stop heparin drip after 48 hours, restart apixaban  - Continue high intensity statin for one week then stop  - Continue plavix  - Continue beta blocker  - Start isosorbide dinitrate/hydralazine HCl 1 tablet dosing qd, if tolerating can add entresto  - STAT ECG if patient has chest discomfort and call Cardiology   - Monitor telemetry

## 2024-04-11 NOTE — SUBJECTIVE & OBJECTIVE
Subjective:     History of Present Illness:   Mr. Phelps is a 68 year old male with extensive PMH notable for pAF on amiodarone 200mg qd and apixaban 5mg bid, tachy-loan syndrome, HFpEF with most recent LVEF 65%, CAD s/p PCI 2006 on plavix, HTN, HLD, aortic atherosclerosis, secondary hyperparathyroidism, diverticulosis, COPD, GERD, hypothyroidism, gout, a history of tobacco use, obesity, ESRD s/p failed kidney transplant x2 (1992, 2005) on chronic immunosuppressive medication now on hemodialysis who presented to [facility] ED with chest pain. In the emergency department: ECG with inferolateral ST depressions. Initial troponin 0.035 --> 0.4 --> 1.9. BNP > 3000. BUN/Cr 29/5.8. WBC 6. Stable H/H at 8.2/27. He was started on ACS protocol.     Overnight 04/09 rapid response activated for respiratory distress and flash pulmonary edema. Pt tripoding at bedside, satting in the 90s on nasal cannula, BP with systolic 170s. Pt transferred to MICU for NiPPV and iHD, respiratory status improved. KTS consulted for assistance in his IS medications.     Mr. Phelps is a 68 y.o. year old male who is status post Kidney Transplant Referral - 10/24/2023 - Declined.    He his maintenance immunosuppression consists of:   Immunosuppressants (From admission, onward)      Start     Stop Route Frequency Ordered    04/09/24 1800  tacrolimus capsule 3 mg         -- Oral 2 times daily 04/09/24 1536            Interval History:      Past Medical and Surgical History: Mr. Phelps has a past medical history of Atrial fibrillation, CAD (coronary artery disease) (2006), CHF (congestive heart failure) (2017), CKD (chronic kidney disease) stage 3, GFR 30-59 ml/min, CKD (chronic kidney disease) stage 5, GFR less than 15 ml/min (02/02/2024), COVID-19 (02/07/2023), Diverticulosis, Hyperlipidemia, Hypertension, Keloid cicatrix, Metabolic bone disease, Other emphysema (10/01/2019), Pericarditis, S/P kidney transplant (1992), Thrombocytopenia, Thyroid disease,  and Tobacco use (09/05/2014).  He has a past surgical history that includes Cholecystectomy; Cardioversion (04/30/15); Kidney transplant (2005); Parathyroidectomy; Colonoscopy (April 20, 2011); Colonoscopy (N/A, 3/13/2020); Colonoscopy (N/A, 2/4/2021); Colonoscopy (N/A, 3/3/2021); Coronary angioplasty with stent (9/13/2006); Treatment of cardiac arrhythmia (N/A, 3/28/2022); irrigation and debridement (Right, 8/30/2023); AV fistula placement (Left, 12/18/2023); Percutaneous transluminal angioplasty of arteriovenous fistula (Left, 2/19/2024); Fistulogram (N/A, 2/19/2024); Left heart catheterization (N/A, 2/26/2024); stent, drug eluting, single vessel, coronary (N/A, 2/28/2024); and Coronary angiography (N/A, 2/28/2024).    Past Social and Family History: Mr. Phelps reports that he has been smoking cigarettes. He started smoking about 42 years ago. He has a 20.0 pack-year smoking history. He has never used smokeless tobacco. He reports that he does not drink alcohol and does not use drugs.His family history includes Heart disease in his father; Kidney disease in his brother and sister; Kidney failure in his brother and sister; No Known Problems in his brother, sister, sister, and sister; Thyroid disease in his mother.    Intake/Output - Last 3 Shifts         04/09 0700  04/10 0659 04/10 0700 04/11 0659 04/11 0700 04/12 0659    I.V. (mL/kg) 70 (0.8) 223.4 (2.6)     Blood 350      IV Piggyback 40 24.5     Total Intake(mL/kg) 460 (5.4) 247.9 (2.9)     Urine (mL/kg/hr)  200 (0.1)     Other 2800      Total Output 2800 200     Net -2340 +47.9                     Review of Systems   Constitutional:  Negative for activity change, chills, diaphoresis and fatigue.   Respiratory:  Negative for cough, chest tightness, shortness of breath and wheezing.    Cardiovascular:  Negative for chest pain, palpitations and leg swelling.   Gastrointestinal:  Negative for constipation, diarrhea, nausea and vomiting.   Skin:  Negative for  "color change, pallor, rash and wound.   Neurological:  Negative for dizziness, tremors, syncope, weakness and numbness.     Objective:     Vital Signs (Most Recent):  Temp: 98 °F (36.7 °C) (04/11/24 0800)  Pulse: 63 (04/11/24 1100)  Resp: 17 (04/11/24 1100)  BP: (!) 141/81 (04/11/24 0800)  SpO2: 98 % (04/11/24 1100) Vital Signs (24h Range):  Temp:  [97.2 °F (36.2 °C)-98 °F (36.7 °C)] 98 °F (36.7 °C)  Pulse:  [58-76] 63  Resp:  [13-39] 17  SpO2:  [96 %-100 %] 98 %  BP: ()/(40-81) 141/81     Weight: 84.4 kg (186 lb 1.1 oz)  Height: 6' 1" (185.4 cm)  Body mass index is 24.55 kg/m².     Physical Exam  Constitutional:       Appearance: Normal appearance.   HENT:      Head: Normocephalic and atraumatic.      Right Ear: External ear normal.      Left Ear: External ear normal.      Nose: Nose normal.      Mouth/Throat:      Mouth: Mucous membranes are moist.   Eyes:      Extraocular Movements: Extraocular movements intact.   Cardiovascular:      Rate and Rhythm: Normal rate and regular rhythm.      Pulses: Normal pulses.      Heart sounds: Normal heart sounds.      Comments: LUE AVF with good thrill.   Pulmonary:      Effort: Pulmonary effort is normal. No respiratory distress.      Breath sounds: Normal breath sounds. No wheezing or rales.   Chest:      Chest wall: No tenderness.   Abdominal:      General: Abdomen is flat. There is no distension.      Palpations: Abdomen is soft.      Tenderness: There is no abdominal tenderness. There is no right CVA tenderness, left CVA tenderness or guarding.   Musculoskeletal:      Right lower leg: No edema.      Left lower leg: No edema.   Skin:     General: Skin is warm and dry.      Capillary Refill: Capillary refill takes less than 2 seconds.   Neurological:      General: No focal deficit present.      Mental Status: He is alert and oriented to person, place, and time.   Psychiatric:         Mood and Affect: Mood normal.         Behavior: Behavior normal.         Thought " Content: Thought content normal.         Judgment: Judgment normal.          Significant Labs:  CBC:   Recent Labs   Lab 04/10/24  0123 04/10/24  0605 04/11/24  0335   WBC 11.54 9.38  9.38 6.63   RBC 2.68* 3.02*  3.02* 2.76*   HGB 6.8* 7.7*  7.7* 7.0*   HCT 22.2* 24.6*  24.6* 22.2*    232  232 211   MCV 83 82  82 80*   MCH 25.4* 25.5*  25.5* 25.4*   MCHC 30.6* 31.3*  31.3* 31.5*     CMP:   Recent Labs   Lab 04/09/24  1157 04/09/24  2219 04/10/24  0605 04/11/24  0335   GLU 92 119* 76 85   CALCIUM 8.4* 8.3* 8.1* 7.7*   ALBUMIN 3.0*  --   --   --    PROT 6.8  --   --   --     141 138 138   K 3.6 4.2 4.1 3.6   CO2 20* 20* 25 25    107 105 105   BUN 29* 31* 18 27*   CREATININE 5.8* 5.9* 3.7* 5.0*   ALKPHOS 81  --   --   --    ALT 17  --   --   --    AST 16  --   --   --        Diagnostics:

## 2024-04-12 VITALS
OXYGEN SATURATION: 100 % | SYSTOLIC BLOOD PRESSURE: 112 MMHG | HEIGHT: 73 IN | WEIGHT: 186.06 LBS | HEART RATE: 58 BPM | TEMPERATURE: 98 F | DIASTOLIC BLOOD PRESSURE: 52 MMHG | BODY MASS INDEX: 24.66 KG/M2 | RESPIRATION RATE: 20 BRPM

## 2024-04-12 LAB
ANION GAP SERPL CALC-SCNC: 9 MMOL/L (ref 8–16)
BASOPHILS # BLD AUTO: 0 K/UL (ref 0–0.2)
BASOPHILS NFR BLD: 0 % (ref 0–1.9)
BLD PROD TYP BPU: NORMAL
BLD PROD TYP BPU: NORMAL
BLOOD UNIT EXPIRATION DATE: NORMAL
BLOOD UNIT EXPIRATION DATE: NORMAL
BLOOD UNIT TYPE CODE: 7300
BLOOD UNIT TYPE CODE: 7300
BLOOD UNIT TYPE: NORMAL
BLOOD UNIT TYPE: NORMAL
BUN SERPL-MCNC: 33 MG/DL (ref 8–23)
CALCIUM SERPL-MCNC: 7.3 MG/DL (ref 8.7–10.5)
CHLORIDE SERPL-SCNC: 104 MMOL/L (ref 95–110)
CO2 SERPL-SCNC: 25 MMOL/L (ref 23–29)
CODING SYSTEM: NORMAL
CODING SYSTEM: NORMAL
CREAT SERPL-MCNC: 5.9 MG/DL (ref 0.5–1.4)
CROSSMATCH INTERPRETATION: NORMAL
CROSSMATCH INTERPRETATION: NORMAL
DIFFERENTIAL METHOD BLD: ABNORMAL
DISPENSE STATUS: NORMAL
DISPENSE STATUS: NORMAL
EOSINOPHIL # BLD AUTO: 0.3 K/UL (ref 0–0.5)
EOSINOPHIL NFR BLD: 4 % (ref 0–8)
ERYTHROCYTE [DISTWIDTH] IN BLOOD BY AUTOMATED COUNT: 17.4 % (ref 11.5–14.5)
EST. GFR  (NO RACE VARIABLE): 9.7 ML/MIN/1.73 M^2
GLUCOSE SERPL-MCNC: 102 MG/DL (ref 70–110)
HCT VFR BLD AUTO: 21.9 % (ref 40–54)
HGB BLD-MCNC: 6.8 G/DL (ref 14–18)
IMM GRANULOCYTES # BLD AUTO: 0.03 K/UL (ref 0–0.04)
IMM GRANULOCYTES NFR BLD AUTO: 0.5 % (ref 0–0.5)
LYMPHOCYTES # BLD AUTO: 1.4 K/UL (ref 1–4.8)
LYMPHOCYTES NFR BLD: 21 % (ref 18–48)
MAGNESIUM SERPL-MCNC: 2.3 MG/DL (ref 1.6–2.6)
MCH RBC QN AUTO: 24.4 PG (ref 27–31)
MCHC RBC AUTO-ENTMCNC: 31.1 G/DL (ref 32–36)
MCV RBC AUTO: 79 FL (ref 82–98)
MONOCYTES # BLD AUTO: 0.7 K/UL (ref 0.3–1)
MONOCYTES NFR BLD: 10.6 % (ref 4–15)
NEUTROPHILS # BLD AUTO: 4.1 K/UL (ref 1.8–7.7)
NEUTROPHILS NFR BLD: 63.9 % (ref 38–73)
NRBC BLD-RTO: 0 /100 WBC
NUM UNITS TRANS PACKED RBC: NORMAL
NUM UNITS TRANS PACKED RBC: NORMAL
PHOSPHATE SERPL-MCNC: 2.7 MG/DL (ref 2.7–4.5)
PLATELET # BLD AUTO: 214 K/UL (ref 150–450)
PMV BLD AUTO: 11.7 FL (ref 9.2–12.9)
POTASSIUM SERPL-SCNC: 3.5 MMOL/L (ref 3.5–5.1)
RBC # BLD AUTO: 2.79 M/UL (ref 4.6–6.2)
SODIUM SERPL-SCNC: 138 MMOL/L (ref 136–145)
TACROLIMUS BLD-MCNC: 4.6 NG/ML (ref 5–15)
WBC # BLD AUTO: 6.43 K/UL (ref 3.9–12.7)

## 2024-04-12 PROCEDURE — 99233 SBSQ HOSP IP/OBS HIGH 50: CPT | Mod: ,,, | Performed by: INTERNAL MEDICINE

## 2024-04-12 PROCEDURE — 25000003 PHARM REV CODE 250

## 2024-04-12 PROCEDURE — 80048 BASIC METABOLIC PNL TOTAL CA: CPT

## 2024-04-12 PROCEDURE — 84100 ASSAY OF PHOSPHORUS: CPT

## 2024-04-12 PROCEDURE — 80100014 HC HEMODIALYSIS 1:1

## 2024-04-12 PROCEDURE — 36430 TRANSFUSION BLD/BLD COMPNT: CPT

## 2024-04-12 PROCEDURE — 94640 AIRWAY INHALATION TREATMENT: CPT

## 2024-04-12 PROCEDURE — 63600175 PHARM REV CODE 636 W HCPCS

## 2024-04-12 PROCEDURE — 94761 N-INVAS EAR/PLS OXIMETRY MLT: CPT

## 2024-04-12 PROCEDURE — P9016 RBC LEUKOCYTES REDUCED: HCPCS | Performed by: STUDENT IN AN ORGANIZED HEALTH CARE EDUCATION/TRAINING PROGRAM

## 2024-04-12 PROCEDURE — 80197 ASSAY OF TACROLIMUS: CPT | Performed by: INTERNAL MEDICINE

## 2024-04-12 PROCEDURE — 99900035 HC TECH TIME PER 15 MIN (STAT)

## 2024-04-12 PROCEDURE — 25000003 PHARM REV CODE 250: Performed by: INTERNAL MEDICINE

## 2024-04-12 PROCEDURE — 85025 COMPLETE CBC W/AUTO DIFF WBC: CPT

## 2024-04-12 PROCEDURE — 83735 ASSAY OF MAGNESIUM: CPT

## 2024-04-12 RX ORDER — HYDRALAZINE HYDROCHLORIDE 50 MG/1
50 TABLET, FILM COATED ORAL 3 TIMES DAILY
Qty: 270 TABLET | Refills: 3 | Status: SHIPPED | OUTPATIENT
Start: 2024-04-12

## 2024-04-12 RX ORDER — HYDROCODONE BITARTRATE AND ACETAMINOPHEN 500; 5 MG/1; MG/1
TABLET ORAL
Status: DISCONTINUED | OUTPATIENT
Start: 2024-04-12 | End: 2024-04-12

## 2024-04-12 RX ORDER — NIFEDIPINE 60 MG/1
60 TABLET, EXTENDED RELEASE ORAL 2 TIMES DAILY
Qty: 180 TABLET | Refills: 3 | Status: SHIPPED | OUTPATIENT
Start: 2024-04-12 | End: 2025-04-12

## 2024-04-12 RX ORDER — IPRATROPIUM BROMIDE AND ALBUTEROL SULFATE 2.5; .5 MG/3ML; MG/3ML
3 SOLUTION RESPIRATORY (INHALATION) EVERY 4 HOURS PRN
Qty: 75 ML | Refills: 0 | Status: SHIPPED | OUTPATIENT
Start: 2024-04-12 | End: 2025-04-12

## 2024-04-12 RX ADMIN — APIXABAN 5 MG: 5 TABLET, FILM COATED ORAL at 08:04

## 2024-04-12 RX ADMIN — PARICALCITOL 1 MCG: 1 CAPSULE, LIQUID FILLED ORAL at 08:04

## 2024-04-12 RX ADMIN — FLUTICASONE FUROATE AND VILANTEROL TRIFENATATE 1 PUFF: 100; 25 POWDER RESPIRATORY (INHALATION) at 10:04

## 2024-04-12 RX ADMIN — ATORVASTATIN CALCIUM 80 MG: 40 TABLET, FILM COATED ORAL at 08:04

## 2024-04-12 RX ADMIN — METOPROLOL SUCCINATE 12.5 MG: 25 TABLET, EXTENDED RELEASE ORAL at 08:04

## 2024-04-12 RX ADMIN — CHOLECALCIFEROL TAB 25 MCG (1000 UNIT) 1000 UNITS: 25 TAB at 08:04

## 2024-04-12 RX ADMIN — THERA TABS 1 TABLET: TAB at 08:04

## 2024-04-12 RX ADMIN — TIOTROPIUM BROMIDE INHALATION SPRAY 2 PUFF: 1.56 SPRAY, METERED RESPIRATORY (INHALATION) at 10:04

## 2024-04-12 RX ADMIN — HYDROCODONE BITARTRATE AND ACETAMINOPHEN 1 TABLET: 10; 325 TABLET ORAL at 05:04

## 2024-04-12 RX ADMIN — PREDNISONE 5 MG: 5 TABLET ORAL at 08:04

## 2024-04-12 RX ADMIN — PANTOPRAZOLE SODIUM 40 MG: 40 TABLET, DELAYED RELEASE ORAL at 08:04

## 2024-04-12 RX ADMIN — DOXAZOSIN 4 MG: 2 TABLET ORAL at 08:04

## 2024-04-12 RX ADMIN — ASPIRIN 81 MG CHEWABLE TABLET 81 MG: 81 TABLET CHEWABLE at 08:04

## 2024-04-12 RX ADMIN — MUPIROCIN: 20 OINTMENT TOPICAL at 08:04

## 2024-04-12 RX ADMIN — TACROLIMUS 3 MG: 1 CAPSULE ORAL at 08:04

## 2024-04-12 RX ADMIN — CLOPIDOGREL BISULFATE 75 MG: 75 TABLET ORAL at 08:04

## 2024-04-12 RX ADMIN — AMIODARONE HYDROCHLORIDE 200 MG: 200 TABLET ORAL at 08:04

## 2024-04-12 NOTE — ASSESSMENT & PLAN NOTE
Patient with history of COPD and ESRD stepping up to MICU for acute SOB, concern for Flash pulmonary edema  CXR with worsening edema  BNP increased to 3000 from 2000  Patient notably tachypneic on exam  Oxygen reqs increased to 6 L NC from RA  Unable to tolerate re-breather    -Patient never placed on BiPAP, now stable on RA  -received dialysis 4/10 and 4/12  -nephrology following

## 2024-04-12 NOTE — PLAN OF CARE
MICU DAILY GOALS     Family/Goals of care/Code Status   Code Status: Full Code    24H Vital Sign Range  Temp:  [97.5 °F (36.4 °C)-98.4 °F (36.9 °C)]   Pulse:  [59-76]   Resp:  [10-32]   BP: ()/(47-81)   SpO2:  [97 %-100 %]      Shift Events (include procedures and significant events)  No acute events throughout shift. C/o occasional intermittent ankle pain. Heparin gtt stopped overnight per orders. Received 1 u PRBCs for Hgb of 6.8. Receiving HD treatment this morning. Transfer orders still in place.    AWAKE RASS: Goal - RASS Goal: 0-->alert and calm  Actual - RASS (Gant Agitation-Sedation Scale): alert and calm    Restraint necessity: Not necessary   BREATHE SBT: Not intubated    Coordinate A & B, analgesics/sedatives Pain: managed   SAT: Not intubated   Delirium CAM-ICU: Overall CAM-ICU: Negative   Early(intubated/ Progressive (non-intubated) Mobility MOVE Screen (INTUBATED ONLY): Not intubated    Activity: Activity Management: Rolling - L1, Arm raise - L1, Up in chair - L3   Feeding/Nutrition Diet order: Diet/Nutrition Received: regular, Specialty Diet/Nutrition Received: renal diet   Thrombus DVT prophylaxis: VTE Required Core Measure: Pharmacological prophylaxis initiated/maintained   HOB Elevation Head of Bed (HOB) Positioning: HOB at 20-30 degrees   Ulcer Prophylaxis GI: yes   Glucose control managed     Skin Skin assessed during: Q Shift Change    Sacrum intact/not altered? Yes  Heels intact/not altered? Yes  Surgical wound? No    CHECK ONE!   (no altered skin or altered skin) and sub boxes:  [x] No Altered Skin Integrity Present    [x]Prevention Measures Documented    [] Altered Skin Integrity Present or Discovered   [] LDA present in EPIC, daily doc completed              [] LDA added if not in EPIC (describe wound).                    When describing wound, do not stage, use descriptive words only.    [] Wound Image Taken (required on admit,                   transfer/discharge and every  Tuesday)    Wound Care Consulted? No    4 EYES:  Attending Nurse (1st set of eyes): Andrea Croft RN    Second RN/Staff Member (2nd set of eyes): Kerri Saha RN   Bowel Function no issues    Indwelling Catheter Necessity         De-escalation Antibiotics         VS and assessment per flow sheet, patient progressing towards goals as tolerated, plan of care reviewed with [unfilled] and family - pt and pt wife, all concerns addressed at this time.    Andrea Croft RN

## 2024-04-12 NOTE — PROGRESS NOTES
[facility]  Nephrology  Progress Note    Patient Name: Ibrahima Phelps  MRN: [id number]  Admission Date: 4/9/2024  Hospital Length of Stay: 2 days  Attending Provider: Temi Traylor MD   Primary Care Physician: Anatoliy Colindres MD  Principal Problem:Chest pain    Subjective:     Interval History:   HD completed this morning with net UF of 2L.  No complaints on exam today.  Possible discharge today.    Review of patient's allergies indicates:   Allergen Reactions    Ace inhibitors Swelling    Verapamil Other (See Comments)     Other reaction(s): Unknown    Povidone-iodine Itching     Current Facility-Administered Medications   Medication Frequency    acetaminophen tablet 650 mg Q4H PRN    albuterol-ipratropium 2.5 mg-0.5 mg/3 mL nebulizer solution 3 mL Q4H PRN    aluminum-magnesium hydroxide-simethicone 200-200-20 mg/5 mL suspension 30 mL QID PRN    amiodarone tablet 200 mg Daily    apixaban tablet 5 mg BID    aspirin chewable tablet 81 mg Daily    atorvastatin tablet 80 mg Daily    bisacodyL suppository 10 mg Daily PRN    clopidogreL tablet 75 mg Daily    dextrose 10% bolus 125 mL 125 mL PRN    dextrose 10% bolus 250 mL 250 mL PRN    doxazosin tablet 4 mg Q12H    fluticasone furoate-vilanteroL 100-25 mcg/dose diskus inhaler 1 puff Daily    gabapentin capsule 100 mg PRN    glucagon (human recombinant) injection 1 mg PRN    glucose chewable tablet 16 g PRN    glucose chewable tablet 24 g PRN    HYDROcodone-acetaminophen  mg per tablet 1 tablet Q6H PRN    HYDROcodone-acetaminophen 5-325 mg per tablet 1 tablet Q6H PRN    melatonin tablet 6 mg Nightly PRN    metoprolol succinate (TOPROL-XL) 24 hr split tablet 12.5 mg Daily    multivitamin tablet Daily    mupirocin 2 % ointment BID    naloxone 0.4 mg/mL injection 0.02 mg PRN    nitroGLYCERIN SL tablet 0.4 mg Q5 Min PRN    ondansetron disintegrating tablet 8 mg Q8H PRN    pantoprazole EC tablet 40 mg Daily    paricalcitoL capsule 1 mcg Every Mon, Wed,  Fri    polyethylene glycol packet 17 g BID PRN    predniSONE tablet 5 mg Daily    prochlorperazine injection Soln 5 mg Q6H PRN    simethicone chewable tablet 80 mg QID PRN    sodium chloride 0.9% flush 10 mL Q12H PRN    tacrolimus capsule 3 mg BID    tiotropium bromide 1.25 mcg/actuation inhaler 2 puff Daily    vitamin D 1000 units tablet 1,000 Units BID       Objective:     Vital Signs (Most Recent):  Temp: 97.8 °F (36.6 °C) (04/12/24 0800)  Pulse: 61 (04/12/24 0900)  Resp: 18 (04/12/24 0900)  BP: (!) 96/48 (04/12/24 0900)  SpO2: 100 % (04/12/24 0900) Vital Signs (24h Range):  Temp:  [97.4 °F (36.3 °C)-98.4 °F (36.9 °C)] 97.8 °F (36.6 °C)  Pulse:  [59-76] 61  Resp:  [10-40] 18  SpO2:  [97 %-100 %] 100 %  BP: ()/(47-86) 96/48     Weight: 84.4 kg (186 lb 1.1 oz) (04/10/24 1335)  Body mass index is 24.55 kg/m².  Body surface area is 2.08 meters squared.    I/O last 3 completed shifts:  In: 771 [I.V.:396.5; Blood:350; IV Piggyback:24.5]  Out: 1100 [Urine:1100]     Physical Exam  Vitals and nursing note reviewed.   Constitutional:       General: He is not in acute distress.     Appearance: Normal appearance. He is not ill-appearing or toxic-appearing.   HENT:      Head: Normocephalic and atraumatic.      Nose: No congestion or rhinorrhea.      Mouth/Throat:      Mouth: Mucous membranes are moist.      Pharynx: No oropharyngeal exudate or posterior oropharyngeal erythema.   Eyes:      General: No scleral icterus.        Right eye: No discharge.         Left eye: No discharge.      Extraocular Movements: Extraocular movements intact.      Conjunctiva/sclera: Conjunctivae normal.   Cardiovascular:      Rate and Rhythm: Normal rate. Rhythm irregular.      Heart sounds: No murmur heard.     No friction rub. No gallop.      Comments: MIRA AVF ++  Pulmonary:      Effort: Pulmonary effort is normal. No respiratory distress.      Breath sounds: No wheezing or rales.   Abdominal:      General: Bowel sounds are normal. There  is no distension.      Palpations: Abdomen is soft.      Tenderness: There is no abdominal tenderness.   Musculoskeletal:         General: No swelling.      Cervical back: Normal range of motion and neck supple.      Right lower leg: No edema.      Left lower leg: No edema.   Skin:     General: Skin is warm and dry.   Neurological:      General: No focal deficit present.      Mental Status: He is alert and oriented to person, place, and time.          Significant Labs:  CBC:   Recent Labs   Lab 04/12/24  0311   WBC 6.43   RBC 2.79*   HGB 6.8*   HCT 21.9*      MCV 79*   MCH 24.4*   MCHC 31.1*     CMP:   Recent Labs   Lab 04/09/24  1157 04/09/24  2219 04/12/24  0311   GLU 92   < > 102   CALCIUM 8.4*   < > 7.3*   ALBUMIN 3.0*  --   --    PROT 6.8  --   --       < > 138   K 3.6   < > 3.5   CO2 20*   < > 25      < > 104   BUN 29*   < > 33*   CREATININE 5.8*   < > 5.9*   ALKPHOS 81  --   --    ALT 17  --   --    AST 16  --   --    BILITOT 0.6  --   --     < > = values in this interval not displayed.        Assessment/Plan:     Cardiac/Vascular  * Chest pain  -Resolved @ time of evaluation  -Trop 0.035 --> 4.5-->3.3   -GDMT      Renal/  ESRD (end stage renal disease) on dialysis  ESRD on iHD TTS  [facility] [place]  Dr. Gambino  ?EDW  MIRA AVF  + residual renal fx    Plan/Recommendations:  -HD completed today, possible discharge.  -informed patient to attend his dialysis session tomorrow at his OP unit if discharged.  -strict I/O's  -renal diet when advanced  -defer EPO to HD unit        Noe Shankar NP  Nephrology  [facility]

## 2024-04-12 NOTE — SUBJECTIVE & OBJECTIVE
Interval History/Significant Events: transfused one unit PRBC for Hgb 6.8    Review of Systems   Constitutional:  Positive for chills (baseline, per patient). Negative for fatigue and fever.   HENT:  Negative for congestion, rhinorrhea and sore throat.    Eyes:  Negative for pain, discharge and itching.   Respiratory:  Negative for cough, chest tightness and shortness of breath.    Cardiovascular:  Negative for chest pain and leg swelling.   Gastrointestinal:  Negative for abdominal pain, constipation, diarrhea, nausea and vomiting.   Endocrine: Negative.    Genitourinary:  Negative for difficulty urinating, dysuria and frequency.   Musculoskeletal:  Positive for arthralgias (Right ankle). Negative for joint swelling and myalgias.   Skin: Negative.    Allergic/Immunologic: Negative.    Neurological:  Negative for dizziness, light-headedness, numbness and headaches.   Hematological: Negative.    Psychiatric/Behavioral: Negative.       Objective:     Vital Signs (Most Recent):  Temp: 97.8 °F (36.6 °C) (04/12/24 0800)  Pulse: 62 (04/12/24 0819)  Resp: 16 (04/12/24 0815)  BP: (!) 97/53 (04/12/24 0815)  SpO2: 100 % (04/12/24 0815) Vital Signs (24h Range):  Temp:  [97.4 °F (36.3 °C)-98.4 °F (36.9 °C)] 97.8 °F (36.6 °C)  Pulse:  [59-76] 62  Resp:  [10-32] 16  SpO2:  [97 %-100 %] 100 %  BP: ()/(47-86) 97/53   Weight: 84.4 kg (186 lb 1.1 oz)  Body mass index is 24.55 kg/m².      Intake/Output Summary (Last 24 hours) at 4/12/2024 0828  Last data filed at 4/12/2024 0615  Gross per 24 hour   Intake 643.54 ml   Output 300 ml   Net 343.54 ml          Physical Exam  Vitals and nursing note reviewed.   Constitutional:       Appearance: He is not toxic-appearing.   HENT:      Mouth/Throat:      Mouth: Mucous membranes are moist.   Eyes:      Extraocular Movements: Extraocular movements intact.      Conjunctiva/sclera: Conjunctivae normal.      Pupils: Pupils are equal, round, and reactive to light.   Cardiovascular:      Rate  and Rhythm: Normal rate and regular rhythm.      Heart sounds: Normal heart sounds.   Pulmonary:      Effort: Pulmonary effort is normal.      Breath sounds: Normal breath sounds.   Abdominal:      General: Abdomen is flat. There is no distension.      Tenderness: There is no abdominal tenderness.   Musculoskeletal:      Right ankle: Swelling (mild swelling over lateral malleolus) present. No deformity or lacerations. Tenderness present over the lateral malleolus. Decreased range of motion (pain with active or passive eversion of right foot).      Left ankle: No swelling, deformity or lacerations. No tenderness. Normal range of motion.      Right foot: No tenderness or bony tenderness. Normal pulse.      Left foot: No tenderness or bony tenderness. Normal pulse.   Skin:     General: Skin is warm and dry.   Neurological:      General: No focal deficit present.      Mental Status: He is alert and oriented to person, place, and time.      Cranial Nerves: No cranial nerve deficit.      Sensory: No sensory deficit.   Psychiatric:         Mood and Affect: Mood normal.         Behavior: Behavior normal.            Vents:     Lines/Drains/Airways       Peripheral Intravenous Line  Duration                  Hemodialysis AV Fistula Left upper arm -- days         Hemodialysis AV Fistula 02/19/24 0924 Left upper arm 52 days         Midline Catheter - Single Lumen 04/10/24 0030 Right basilic vein (medial side of arm) 2 days         Peripheral IV - Single Lumen 04/10/24 1000 22 G Distal;Posterior;Right Forearm 1 day                  Significant Labs:    CBC/Anemia Profile:  Recent Labs   Lab 04/11/24  0335 04/12/24  0311   WBC 6.63 6.43   HGB 7.0* 6.8*   HCT 22.2* 21.9*    214   MCV 80* 79*   RDW 17.7* 17.4*        Chemistries:  Recent Labs   Lab 04/11/24  0335 04/12/24  0311    138   K 3.6 3.5    104   CO2 25 25   BUN 27* 33*   CREATININE 5.0* 5.9*   CALCIUM 7.7* 7.3*   MG 2.4 2.3   PHOS 2.4* 2.7       All  pertinent labs within the past 24 hours have been reviewed.    Significant Imaging:  I have reviewed all pertinent imaging results/findings within the past 24 hours.

## 2024-04-12 NOTE — ASSESSMENT & PLAN NOTE
ESRD on iHD TTS  [facility]  Dr. Gambino  ?EDW  MIRA AVF  + residual renal fx    Plan/Recommendations:  -HD completed today, possible discharge.  -informed patient to attend his dialysis session tomorrow at his OP unit if discharged.  -strict I/O's  -renal diet when advanced  -defer EPO to HD unit

## 2024-04-12 NOTE — PROGRESS NOTES
04/12/24 0515   During Hemodialysis Assessment   Blood Flow Rate (mL/min) 350 mL/min   Dialysate Flow Rate (mL/min) 700 ml/min   Ultrafiltration Rate (mL/Hr) 670 mL/Hr   Arteriovenous Lines Secure Yes   Arterial Pressure (mmHg) -90 mmHg   Venous Pressure (mmHg) 110   Blood Volume Processed (Liters) 0 L   UF Removed (mL) 0 mL   TMP 50   Venous Line in Air Detector Yes   Intake (mL) 250 mL   Intra-Hemodialysis Comments HD initiated     Report received from primary RN. HD started per MD order.

## 2024-04-12 NOTE — ASSESSMENT & PLAN NOTE
Patient is identified as having Diastolic (HFpEF) heart failure that is Chronic.    Echo 4/09/24    Left Ventricle: The left ventricle is normal in size. Normal wall thickness. There is mildly reduced systolic function with a visually estimated ejection fraction of 45 - 50%. There is normal diastolic function.    Right Ventricle: Normal right ventricular cavity size. Wall thickness is normal. Right ventricle wall motion  is normal. Systolic function is normal.    Left Atrium: Left atrium is mildly dilated.    Right Atrium: Right atrium is mildly dilated.    Aortic Valve: There is mild stenosis. Aortic valve area by VTI is 2.03 cm². Aortic valve peak velocity is 2.73 m/s. Mean gradient is 15 mmHg. The dimensionless index is 0.49. There is mild aortic regurgitation.    Pulmonary Artery: There is mild pulmonary hypertension. The estimated pulmonary artery systolic pressure is 40 mmHg.    IVC/SVC: Normal venous pressure at 3 mmHg.    BNP 3075 (2000)    - Continue metoprolol  - Monitor on telemetry.  - Monitor strict Is&Os and daily weights. Place on fluid restriction of 1.5 L.   - Continue to stress to patient importance of self efficacy and  on diet for CHF.  - Volume management with PRN lasix and HD

## 2024-04-12 NOTE — ASSESSMENT & PLAN NOTE
On 4/11 pt c/o severe R ankle pain. Has hx of gout, states pain is similar to gout but worse. Has previously been on gout medications. But has not taken any prophylactic gout meds for past month. Physical exam not concerning for gout or septic joint. Pain only with passive or active eversion of right foot; able to dorsiflex, plantar flex, invert foot with limited ROM but without pain. Uric acid 5.2, no evidence of fracture or dislocation on X-ray.    - trial ambulation today  - pain controlled with oral pain medication

## 2024-04-12 NOTE — PLAN OF CARE
[facility] Hospital Follow Up with Ness Munoz DNP  Tuesday Apr 16, 2024 10:30 AM  Please arrive approximately 15 minutes before your scheduled appointment time and ensure that you have a valid government issued ID and your insurance card. ePre-Check is available and completion prior to your arrival will assist with a quicker registration process.    [facility]  [address]  [facility] [id number]  [phone]       Jacklyn Calero RN     [phone]

## 2024-04-12 NOTE — PROGRESS NOTES
04/12/24 0815   Post-Hemodialysis Assessment   Rinseback Volume (mL) 250 mL   Blood Volume Processed (Liters) 59 L   Dialyzer Clearance Lightly streaked   Duration of Treatment 180 minutes   Additional Fluid Intake (mL) 250 mL   Total UF (mL) 2500 mL   Net Fluid Removal 2000   Patient Response to Treatment tolerated well   Post-Treatment Weight   (Bedside HD)   Post-Hemodialysis Comments see notes     HD TX complete. Pt tolerated well. Net removal 2000 ml. Pt VSS, AAO, hemostasis achieved. Report given to primary nurse.

## 2024-04-12 NOTE — PROGRESS NOTES
[facility] - Cardiac Medical ICU  Critical Care Medicine  Progress Note    Patient Name: Ibrahima Phelps  MRN: [id number]  Admission Date: 4/9/2024  Hospital Length of Stay: 2 days  Code Status: Full Code  Attending Provider: Temi Traylor MD  Primary Care Provider: Anatoliy Colindres MD   Principal Problem: Chest pain    Subjective:     HPI:  Mr. Phelps is a 68 year old male with extensive PMH notable for pAF on amiodarone 200mg qd and apixaban 5mg bid, tachy-loan syndrome, HFpEF with most recent LVEF 65%, CAD s/p PCI 2006 on plavix, HTN, HLD, aortic atherosclerosis, secondary hyperparathyroidism, diverticulosis, COPD, GERD, hypothyroidism, gout, a history of tobacco use, obesity, ESRD s/p failed kidney transplant x2 (1992, 2005) on chronic immunosuppressive medication now on hemodialysis who presented to [facility] ED with chest pain. Per chart review, EMS activated and initial rhythm AF. Patient endorsed 2.5 hours of chest discomfort, with associated diaphoresis. Denies radiation. Of note, patient recently underwent coronary angiography in Feb 2024 and was found to have multivessel CAD. CTS evaluated and had declined patient for CABG and patient under PCI to the RCA on 02/28/2024. Mid LAD 70% + OM1/OM2 70% obstruction.     In the emergency department: ECG with inferolateral ST depressions. Initial troponin 0.035 --> 0.4 --> 1.9. BNP > 3000. BUN/Cr 29/5.8. WBC 6. Stable H/H at 8.2/27. Admitted to hospital medicine with cardiology consulted. He was started on ACS protocol at the recommendation of cardiology.     Overnight 04/09 rapid response activated for respiratory distress and flash pulmonary edema. Wife at bedside states he became acutely short of breath. Pt tripoding at bedside, satting in the 90s on nasal cannula, BP with systolic 170s. Pt does produce some urine still at baseline. Last dialysis was 3 days prior. History limited 2/2 patient respiratory distress. Patient transferred to the MICU for NIPPV and iHD.      Hospital/ICU Course:  In the emergency department: ECG with inferolateral ST depressions. Initial troponin 0.035 --> 0.4 --> 1.9. BNP > 3000. BUN/Cr 29/5.8. WBC 6. Stable H/H at 8.2/27. Admitted to hospital medicine with cardiology consulted. He was started on ACS protocol at the recommendation of cardiology. Troponin trended until stable    Overnight 04/09 rapid response activated for respiratory distress and flash pulmonary edema. Wife at bedside states he became acutely short of breath. Pt tripoding at bedside, satting in the 90s on nasal cannula, BP with systolic 170s. Pt transferred to MICU for NiPPV and iHD, respiratory status improved without being placed on NiPPV. Now on room air. Dialyzed last night, 2L taken off, patient currently without pain or shortness of breath. On room air, no pressors. Stepped down to floor 4/10. Pt c/o severe R ankle pain on 4/11. Uric acid within normal limits, and R ankle X-ray without fracture or dislocation. On 4/12, patient received dialysis; also received 1 unit PRBCs for hgb 6.8.    Interval History/Significant Events: transfused one unit PRBC for Hgb 6.8    Review of Systems   Constitutional:  Positive for chills (baseline, per patient). Negative for fatigue and fever.   HENT:  Negative for congestion, rhinorrhea and sore throat.    Eyes:  Negative for pain, discharge and itching.   Respiratory:  Negative for cough, chest tightness and shortness of breath.    Cardiovascular:  Negative for chest pain and leg swelling.   Gastrointestinal:  Negative for abdominal pain, constipation, diarrhea, nausea and vomiting.   Endocrine: Negative.    Genitourinary:  Negative for difficulty urinating, dysuria and frequency.   Musculoskeletal:  Positive for arthralgias (Right ankle). Negative for joint swelling and myalgias.   Skin: Negative.    Allergic/Immunologic: Negative.    Neurological:  Negative for dizziness, light-headedness, numbness and headaches.   Hematological: Negative.     Psychiatric/Behavioral: Negative.       Objective:     Vital Signs (Most Recent):  Temp: 97.8 °F (36.6 °C) (04/12/24 0800)  Pulse: 62 (04/12/24 0819)  Resp: 16 (04/12/24 0815)  BP: (!) 97/53 (04/12/24 0815)  SpO2: 100 % (04/12/24 0815) Vital Signs (24h Range):  Temp:  [97.4 °F (36.3 °C)-98.4 °F (36.9 °C)] 97.8 °F (36.6 °C)  Pulse:  [59-76] 62  Resp:  [10-32] 16  SpO2:  [97 %-100 %] 100 %  BP: ()/(47-86) 97/53   Weight: 84.4 kg (186 lb 1.1 oz)  Body mass index is 24.55 kg/m².      Intake/Output Summary (Last 24 hours) at 4/12/2024 0828  Last data filed at 4/12/2024 0615  Gross per 24 hour   Intake 643.54 ml   Output 300 ml   Net 343.54 ml          Physical Exam  Vitals and nursing note reviewed.   Constitutional:       Appearance: He is not toxic-appearing.   HENT:      Mouth/Throat:      Mouth: Mucous membranes are moist.   Eyes:      Extraocular Movements: Extraocular movements intact.      Conjunctiva/sclera: Conjunctivae normal.      Pupils: Pupils are equal, round, and reactive to light.   Cardiovascular:      Rate and Rhythm: Normal rate and regular rhythm.      Heart sounds: Normal heart sounds.   Pulmonary:      Effort: Pulmonary effort is normal.      Breath sounds: Normal breath sounds.   Abdominal:      General: Abdomen is flat. There is no distension.      Tenderness: There is no abdominal tenderness.   Musculoskeletal:      Right ankle: Swelling (mild swelling over lateral malleolus) present. No deformity or lacerations. Tenderness present over the lateral malleolus. Decreased range of motion (pain with active or passive eversion of right foot).      Left ankle: No swelling, deformity or lacerations. No tenderness. Normal range of motion.      Right foot: No tenderness or bony tenderness. Normal pulse.      Left foot: No tenderness or bony tenderness. Normal pulse.   Skin:     General: Skin is warm and dry.   Neurological:      General: No focal deficit present.      Mental Status: He is alert  and oriented to person, place, and time.      Cranial Nerves: No cranial nerve deficit.      Sensory: No sensory deficit.   Psychiatric:         Mood and Affect: Mood normal.         Behavior: Behavior normal.            Vents:     Lines/Drains/Airways       Peripheral Intravenous Line  Duration                  Hemodialysis AV Fistula Left upper arm -- days         Hemodialysis AV Fistula 02/19/24 0924 Left upper arm 52 days         Midline Catheter - Single Lumen 04/10/24 0030 Right basilic vein (medial side of arm) 2 days         Peripheral IV - Single Lumen 04/10/24 1000 22 G Distal;Posterior;Right Forearm 1 day                  Significant Labs:    CBC/Anemia Profile:  Recent Labs   Lab 04/11/24 0335 04/12/24  0311   WBC 6.63 6.43   HGB 7.0* 6.8*   HCT 22.2* 21.9*    214   MCV 80* 79*   RDW 17.7* 17.4*        Chemistries:  Recent Labs   Lab 04/11/24 0335 04/12/24 0311    138   K 3.6 3.5    104   CO2 25 25   BUN 27* 33*   CREATININE 5.0* 5.9*   CALCIUM 7.7* 7.3*   MG 2.4 2.3   PHOS 2.4* 2.7       All pertinent labs within the past 24 hours have been reviewed.    Significant Imaging:  I have reviewed all pertinent imaging results/findings within the past 24 hours.    ABG  Recent Labs   Lab 04/09/24  2214   PH 7.337*   PO2 75*   PCO2 41.4   HCO3 22.2*   BE -4*     Assessment/Plan:     Pulmonary  [facility] pulmonary edema  [facility]    Acute hypoxemic respiratory failure  Patient with history of COPD and ESRD stepping up to MICU for acute SOB, concern for Flash pulmonary edema  CXR with worsening edema  BNP increased to 3000 from 2000  Patient notably tachypneic on exam  Oxygen reqs increased to 6 L NC from RA  Unable to tolerate re-breather    -Patient never placed on BiPAP, now stable on RA  -received dialysis 4/10 and 4/12  -nephrology following    Cardiac/Vascular  * Chest pain  See CAD    Atrial fibrillation with RVR  -Holding eliquis while on ACS protocol  -Continue home amiodarone,  metoprolol  -plan for urgent dialysis    Chronic heart failure with preserved ejection fraction  Patient is identified as having Diastolic (HFpEF) heart failure that is Chronic.    Echo 4/09/24    Left Ventricle: The left ventricle is normal in size. Normal wall thickness. There is mildly reduced systolic function with a visually estimated ejection fraction of 45 - 50%. There is normal diastolic function.    Right Ventricle: Normal right ventricular cavity size. Wall thickness is normal. Right ventricle wall motion  is normal. Systolic function is normal.    Left Atrium: Left atrium is mildly dilated.    Right Atrium: Right atrium is mildly dilated.    Aortic Valve: There is mild stenosis. Aortic valve area by VTI is 2.03 cm². Aortic valve peak velocity is 2.73 m/s. Mean gradient is 15 mmHg. The dimensionless index is 0.49. There is mild aortic regurgitation.    Pulmonary Artery: There is mild pulmonary hypertension. The estimated pulmonary artery systolic pressure is 40 mmHg.    IVC/SVC: Normal venous pressure at 3 mmHg.    BNP 3075 (2000)    - Continue metoprolol  - Monitor on telemetry.  - Monitor strict Is&Os and daily weights. Place on fluid restriction of 1.5 L.   - Continue to stress to patient importance of self efficacy and  on diet for CHF.  - Volume management with PRN lasix and HD    Paroxysmal atrial fibrillation  -Completed heparin gtt 48 hours per ACS protocol  -Can resume Eliquis    Mixed hyperlipidemia  Continue high intensity statin    CAD (coronary artery disease)  Patient underwent coronary angiography 02/2024 and was found to have multivessel CAD. CTS had declined patient for CABG and patient under PCI to the RCA on 02/28/2024. Mid LAD 70% + OM1/OM2 70% obstruction remained medically managed as thought was that culprit vessel (RCA) was addressed.   Trop on admission 0.035 --> 0.4 --> 1.9-->3.78  Cardiology consulted, likely type II demand    - Trop trended to peak  - TTE EF 45-50%  - 48  hours heparin gtt, per ACS. Ready to resume oral anticoagulation  - continue high intensity statin  - continue beta blocker  - NTG SL 0.4  q 5 min PRN for chest discomfort  - Cardiac telemetry  - Stable for discharge    Primary hypertension  BP on evaluation 190/70    -continue home nifedipine, hydralazine  -initiated nicardipine infusion, goal BP <180 systolic    Renal/  ESRD (end stage renal disease) on dialysis  TThS schedule  Duration: 3.5 hrs  Access: LUE   Residual Renal Function: minimal  Last HD prior to admission: 4/6    - Nephrology consulted  - Will dialyze for flash pulmonary edema  - metabolic profile daily    H/O kidney transplant  Chronic immunosuppression, prophylactic immunotherapy    - Continue home prednisone and tacro taper    Immunology/Multi System  Prophylactic immunotherapy  Failed kidney transplant x 2  On tacro + pred per transplant nephrology    GI  GERD (gastroesophageal reflux disease)  -continue protonix    Orthopedic  Right ankle pain  On 4/11 pt c/o severe R ankle pain. Has hx of gout, states pain is similar to gout but worse. Has previously been on gout medications. But has not taken any prophylactic gout meds for past month. Physical exam not concerning for gout or septic joint. Pain only with passive or active eversion of right foot; able to dorsiflex, plantar flex, invert foot with limited ROM but without pain. Uric acid 5.2, no evidence of fracture or dislocation on X-ray.    - trial ambulation today  - pain controlled with oral pain medication       Critical Care Daily Checklist:    A: Awake: RASS Goal/Actual Goal: RASS Goal: 0-->alert and calm  Actual:     B: Spontaneous Breathing Trial Performed?     C: SAT & SBT Coordinated?  N/A                      D: Delirium: CAM-ICU Overall CAM-ICU: Negative   E: Early Mobility Performed? Yes   F: Feeding Goal:    Status:     Current Diet Order   Procedures    Diet Renal Standard Tray     Order Specific Question:   Tray type:      Answer:   Standard Tray      AS: Analgesia/Sedation Norco 5/10 Q6H PRN for moderate-severe pain   T: Thromboembolic Prophylaxis Heparin gtt, plan to resume Eliquis   H: HOB > 300 Yes   U: Stress Ulcer Prophylaxis (if needed) yes   G: Glucose Control yes   B: Bowel Function     I: Indwelling Catheter (Lines & Hamilton) Necessity Midline, PIV   D: De-escalation of Antimicrobials/Pharmacotherapies none    Plan for the day/ETD Completing dialysis, discharge    Code Status:  Family/Goals of Care: Full Code  updated       Critical secondary to Patient has a condition that poses threat to life and bodily function: Severe Respiratory Distress      Critical care was time spent personally by me on the following activities: development of treatment plan with patient or surrogate and bedside caregivers, discussions with consultants, evaluation of patient's response to treatment, examination of patient, ordering and performing treatments and interventions, ordering and review of laboratory studies, ordering and review of radiographic studies, pulse oximetry, re-evaluation of patient's condition. This critical care time did not overlap with that of any other provider or involve time for any procedures.     Indra Mirza MD  Critical Care Medicine  [facility] - Cardiac Medical ICU

## 2024-04-12 NOTE — DISCHARGE SUMMARY
[facility] - Cardiac Medical ICU  Critical Care Medicine  Discharge Summary      Patient Name: Ibrahima Phelps  MRN: [id number]  Admission Date: 4/9/2024  Hospital Length of Stay: 2 days  Discharge Date and Time:  04/12/2024 1:39 PM  Attending Physician: Temi Traylor MD   Discharging Provider: Indra Mirza MD  Primary Care Provider: Anatoliy Colindres MD  Reason for Admission: NSTEMI    HPI:   Mr. Phelps is a 68 year old male with extensive PMH notable for pAF on amiodarone 200mg qd and apixaban 5mg bid, tachy-loan syndrome, HFpEF with most recent LVEF 65%, CAD s/p PCI 2006 on plavix, HTN, HLD, aortic atherosclerosis, secondary hyperparathyroidism, diverticulosis, COPD, GERD, hypothyroidism, gout, a history of tobacco use, obesity, ESRD s/p failed kidney transplant x2 (1992, 2005) on chronic immunosuppressive medication now on hemodialysis who presented to [facility] ED with chest pain. Per chart review, EMS activated and initial rhythm AF. Patient endorsed 2.5 hours of chest discomfort, with associated diaphoresis. Denies radiation. Of note, patient recently underwent coronary angiography in Feb 2024 and was found to have multivessel CAD. CTS evaluated and had declined patient for CABG and patient under PCI to the RCA on 02/28/2024. Mid LAD 70% + OM1/OM2 70% obstruction.     In the emergency department: ECG with inferolateral ST depressions. Initial troponin 0.035 --> 0.4 --> 1.9. BNP > 3000. BUN/Cr 29/5.8. WBC 6. Stable H/H at 8.2/27. Admitted to hospital medicine with cardiology consulted. He was started on ACS protocol at the recommendation of cardiology.     Overnight 04/09 rapid response activated for respiratory distress and flash pulmonary edema. Wife at bedside states he became acutely short of breath. Pt tripoding at bedside, satting in the 90s on nasal cannula, BP with systolic 170s. Pt does produce some urine still at baseline. Last dialysis was 3 days prior. History limited 2/2 patient respiratory distress. Patient  transferred to the MICU for NIPPV and iHD.     * No surgery found *    Indwelling Lines/Drains at Time of Discharge:   Lines/Drains/Airways       Drain  Duration                  Hemodialysis AV Fistula Left upper arm -- days         Hemodialysis AV Fistula 02/19/24 0924 Left upper arm 53 days                  Hospital Course:   In the emergency department: ECG with inferolateral ST depressions. Initial troponin 0.035 --> 0.4 --> 1.9. BNP > 3000. BUN/Cr 29/5.8. WBC 6. Stable H/H at 8.2/27. Admitted to hospital medicine with cardiology consulted. He was started on ACS protocol at the recommendation of cardiology. Troponin trended until stable    Overnight 04/09 rapid response activated for respiratory distress and flash pulmonary edema. Wife at bedside states he became acutely short of breath. Pt tripoding at bedside, satting in the 90s on nasal cannula, BP with systolic 170s. Pt transferred to MICU for NiPPV and iHD, respiratory status improved without being placed on NiPPV. Now on room air. Dialyzed last night, 2L taken off, patient currently without pain or shortness of breath. On room air, no pressors. Stepped down to floor 4/10. Pt c/o severe R ankle pain on 4/11. Uric acid within normal limits, and R ankle X-ray without fracture or dislocation. On 4/12, patient received dialysis; also received 1 unit PRBCs for hgb 6.8. At this time, patient is stable for discharge with cardiology and PCP follow up. Holding antihypertensives until patient is able to follow up with PCP and cardiology. Continuing metoprolol and amiodarone for atrial fibrillation.    Consults (From admission, onward)          Status Ordering Provider     Inpatient consult to Kidney/Pancreas Transplant Medicine  Once        Provider:  (Not yet assigned)    Completed MARY GUZMÁN     Inpatient consult to Critical Care Medicine  Once        Provider:  (Not yet assigned)    Completed GELY CAMPOS     Inpatient consult to Cardiology  Once         Provider:  (Not yet assigned)    Completed BARBARA PAGAN     Inpatient consult to Nephrology  Once        Provider:  (Not yet assigned)    Completed BARBARA PAGAN          Significant Labs:  All pertinent labs within the past 24 hours have been reviewed.    Significant Imaging:  I have reviewed all pertinent imaging results/findings within the past 24 hours.    Pending Diagnostic Studies:       Procedure Component Value Units Date/Time    CBC auto differential [[id number]]     Order Status: Sent Lab Status: No result     Specimen: Blood     CBC auto differential [[id number]]     Order Status: Sent Lab Status: No result     Specimen: Blood           Final Active Diagnoses:    Diagnosis Date Noted POA    PRINCIPAL PROBLEM:  Chest pain [R07.9] 10/15/2023 Yes    Anemia in ESRD (end-stage renal disease) [N18.6, D63.1] 04/11/2024 Yes    Atrial fibrillation with RVR [I48.91] 04/09/2024 Yes    Flash pulmonary edema [J81.0] 04/09/2024 No    GERD (gastroesophageal reflux disease) [K21.9] 02/19/2024 Yes    ESRD (end stage renal disease) on dialysis [N18.6, Z99.2] 02/02/2024 Not Applicable    ESRD (end stage renal disease) [N18.6] 11/06/2023 Yes    Chronic heart failure with preserved ejection fraction [I50.32] 03/17/2023 Yes    Acute hypoxemic respiratory failure [J96.01] 02/28/2023 Yes    Right ankle pain [M25.571] 02/26/2023 Yes    Prophylactic immunotherapy [Z29.89] 02/08/2023 Not Applicable    Paroxysmal atrial fibrillation [I48.0] 09/25/2017 Yes     Chronic    H/O kidney transplant [Z94.0] 07/10/2015 Not Applicable     Chronic    CAD (coronary artery disease) [I25.10]  Yes     Chronic    Mixed hyperlipidemia [E78.2]  Yes     Chronic    Primary hypertension [I10]  Yes      Problems Resolved During this Admission:     No new Assessment & Plan notes have been filed under this hospital service since the last note was generated.  Service: Critical Care Medicine    Discharged Condition: stable    Disposition: Home or Self  Care  Home    Patient Instructions:      Ambulatory referral/consult to Family Practice   Standing Status: Future   Referral Priority: Routine Referral Type: Consultation   Referral Reason: Specialty Services Required   Referred to Provider: NESSA GRAY Requested Specialty: Family Medicine   Number of Visits Requested: 1     Ambulatory referral/consult to Cardiology   Standing Status: Future   Referral Priority: Routine Referral Type: Consultation   Referral Reason: Specialty Services Required   Referred to Provider: DIONICIO TRIVEDI Requested Specialty: Cardiology   Number of Visits Requested: 1     Medications:  Reconciled Home Medications:      Medication List        CHANGE how you take these medications      albuterol-ipratropium 2.5 mg-0.5 mg/3 mL nebulizer solution  Commonly known as: DUO-NEB  Take 3 mLs by nebulization every 4 (four) hours as needed for Wheezing. Rescue  What changed:   when to take this  additional instructions     hydrALAZINE 50 MG tablet  Commonly known as: APRESOLINE  Take 1 tablet (50 mg total) by mouth 3 (three) times daily. Hold until follow up with PCP or cardiologist  What changed: additional instructions     NIFEdipine 60 MG (OSM) 24 hr tablet  Commonly known as: PROCARDIA-XL  Take 1 tablet (60 mg total) by mouth 2 (two) times a day. Hold until follow up with PCP or cardiologist  What changed: additional instructions            CONTINUE taking these medications      acetaminophen 500 MG tablet  Commonly known as: TYLENOL  Take 1 tablet (500 mg total) by mouth every 6 (six) hours as needed for Pain.     albuterol 90 mcg/actuation inhaler  Commonly known as: VENTOLIN HFA  Inhale 2 puffs into the lungs every 6 (six) hours as needed for Wheezing or Shortness of Breath. Rescue     ALLEGRA ORAL  Take 1 tablet by mouth daily as needed (allergies).     amiodarone 200 MG Tab  Commonly known as: PACERONE  Take 1 tablet (200 mg total) by mouth once daily.     apixaban 5 mg  Tab  Commonly known as: ELIQUIS  Take 1 tablet (5 mg total) by mouth 2 (two) times daily.     aspirin 81 MG EC tablet  Commonly known as: ECOTRIN  Take 1 tablet (81 mg total) by mouth once daily.     atorvastatin 80 MG tablet  Commonly known as: LIPITOR  Take 1 tablet (80 mg total) by mouth once daily.     b complex vitamins tablet  Commonly known as: B COMPLEX-VITAMIN B12  Take 1 tablet by mouth once daily.     BELSOMRA 5 mg Tab  Generic drug: suvorexant  Take 5 mg by mouth nightly as needed (insomnia).     CALCIUM 600 ORAL  Take 1 tablet by mouth 2 (two) times a day.     CHOLECALCIFEROL (VITAMIN D3) ORAL  Take 2 tablets by mouth 2 (two) times daily.     clopidogreL 75 mg tablet  Commonly known as: PLAVIX  Take 1 tablet (75 mg total) by mouth once daily.     doxazosin 4 MG tablet  Commonly known as: CARDURA  Take 1 tablet (4 mg total) by mouth every 12 (twelve) hours.     famotidine 20 MG tablet  Commonly known as: PEPCID  Take 1 tablet (20 mg total) by mouth 2 (two) times daily.     fluticasone furoate-vilanteroL 100-25 mcg/dose diskus inhaler  Commonly known as: BREO  Inhale 1 puff into the lungs once daily. Controller. Use this inhaler every day.     fluticasone propionate 50 mcg/actuation nasal spray  Commonly known as: FLONASE  1 spray (50 mcg total) by Each Nostril route daily as needed for Allergies.     FLUZONE HIGHDOSE QUAD 23-24  mcg/0.7 mL Syrg  Generic drug: flu vacc [id number](65yr up)-PF     furosemide 20 MG tablet  Commonly known as: LASIX  Take 4 tablets (80 mg total) by mouth 2 (two) times daily as needed. For worsening LE swelling or shortness of breath on non-dialysis days     gabapentin 100 MG capsule  Commonly known as: NEURONTIN  Take 1 capsule (100 mg total) by mouth as needed (pain).     metoprolol succinate 25 MG 24 hr tablet  Commonly known as: TOPROL-XL  Take 0.5 tablets (12.5 mg total) by mouth once daily.     MITIGARE 0.6 mg Cap  Generic drug: colchicine  TAKE 1 CAPSULE BY MOUTH  TWICE A DAY AS NEEDED GOUT FLARE UP TO 3 DAYS     multivitamin per tablet  Commonly known as: THERAGRAN  Take 1 tablet by mouth Daily.     nitroGLYCERIN 0.4 MG SL tablet  Commonly known as: NITROSTAT  Place 1 tablet (0.4 mg total) under the tongue every 5 (five) minutes as needed for Chest pain.     omeprazole 20 MG capsule  Commonly known as: PRILOSEC  Take 1 capsule (20 mg total) by mouth daily as needed (heartburn).     paricalcitoL 1 MCG capsule  Commonly known as: ZEMPLAR  TAKE 1 CAPSULE ON MONDAY, WEDNESDAY AND FRIDAY     predniSONE 5 MG tablet  Commonly known as: DELTASONE  Take 1 tablet (5 mg total) by mouth once daily.     pulse oximeter device  Commonly known as: pulse oximeter  by Apply Externally route 2 (two) times a day. Use twice daily at 8 AM and 3 PM and record the value in MyChart as directed.     sevelamer carbonate 800 mg Tab  Commonly known as: RENVELA  Take 1 tablet (800 mg total) by mouth 3 (three) times daily with meals.     tacrolimus 1 MG Cap  Commonly known as: PROGRAF  Take 3 capsules (3 mg total) by mouth every 12 (twelve) hours for 60 days, THEN 2 capsules (2 mg total) every 12 (twelve) hours for 60 days, THEN 1 capsule (1 mg total) every 12 (twelve) hours for 60 days, THEN 1 capsule (1 mg total) once daily.  Start taking on: March 2, 2024     tiotropium bromide 1.25 mcg/actuation inhaler  Commonly known as: SPIRIVA RESPIMAT  Inhale 2 puffs into the lungs once daily. Controller. Use this inhaler every day.            STOP taking these medications      HYDROcodone-acetaminophen 5-325 mg per tablet  Commonly known as: NORCO     PFIZER COVID BIVAL(12Y UP)(PF) 30 mcg/0.3 mL injection  Generic drug: sars-cov-2 (covid-19 pfizer omicron)     SPIKEVAX [id number](12Y UP)(PF) 50 mcg/0.5 mL injection  Generic drug: COVID dib80-25(12up)(andu)(PF)               Indra Mirza MD  Critical Care Medicine  [facility] Cardiac Medical ICU

## 2024-04-12 NOTE — SUBJECTIVE & OBJECTIVE
Interval History:   HD completed this morning with net UF of 2L.  No complaints on exam today.  Possible discharge today.    Review of patient's allergies indicates:   Allergen Reactions    Ace inhibitors Swelling    Verapamil Other (See Comments)     Other reaction(s): Unknown    Povidone-iodine Itching     Current Facility-Administered Medications   Medication Frequency    acetaminophen tablet 650 mg Q4H PRN    albuterol-ipratropium 2.5 mg-0.5 mg/3 mL nebulizer solution 3 mL Q4H PRN    aluminum-magnesium hydroxide-simethicone 200-200-20 mg/5 mL suspension 30 mL QID PRN    amiodarone tablet 200 mg Daily    apixaban tablet 5 mg BID    aspirin chewable tablet 81 mg Daily    atorvastatin tablet 80 mg Daily    bisacodyL suppository 10 mg Daily PRN    clopidogreL tablet 75 mg Daily    dextrose 10% bolus 125 mL 125 mL PRN    dextrose 10% bolus 250 mL 250 mL PRN    doxazosin tablet 4 mg Q12H    fluticasone furoate-vilanteroL 100-25 mcg/dose diskus inhaler 1 puff Daily    gabapentin capsule 100 mg PRN    glucagon (human recombinant) injection 1 mg PRN    glucose chewable tablet 16 g PRN    glucose chewable tablet 24 g PRN    HYDROcodone-acetaminophen  mg per tablet 1 tablet Q6H PRN    HYDROcodone-acetaminophen 5-325 mg per tablet 1 tablet Q6H PRN    melatonin tablet 6 mg Nightly PRN    metoprolol succinate (TOPROL-XL) 24 hr split tablet 12.5 mg Daily    multivitamin tablet Daily    mupirocin 2 % ointment BID    naloxone 0.4 mg/mL injection 0.02 mg PRN    nitroGLYCERIN SL tablet 0.4 mg Q5 Min PRN    ondansetron disintegrating tablet 8 mg Q8H PRN    pantoprazole EC tablet 40 mg Daily    paricalcitoL capsule 1 mcg Every Mon, Wed, Fri    polyethylene glycol packet 17 g BID PRN    predniSONE tablet 5 mg Daily    prochlorperazine injection Soln 5 mg Q6H PRN    simethicone chewable tablet 80 mg QID PRN    sodium chloride 0.9% flush 10 mL Q12H PRN    tacrolimus capsule 3 mg BID    tiotropium bromide 1.25 mcg/actuation  inhaler 2 puff Daily    vitamin D 1000 units tablet 1,000 Units BID       Objective:     Vital Signs (Most Recent):  Temp: 97.8 °F (36.6 °C) (04/12/24 0800)  Pulse: 61 (04/12/24 0900)  Resp: 18 (04/12/24 0900)  BP: (!) 96/48 (04/12/24 0900)  SpO2: 100 % (04/12/24 0900) Vital Signs (24h Range):  Temp:  [97.4 °F (36.3 °C)-98.4 °F (36.9 °C)] 97.8 °F (36.6 °C)  Pulse:  [59-76] 61  Resp:  [10-40] 18  SpO2:  [97 %-100 %] 100 %  BP: ()/(47-86) 96/48     Weight: 84.4 kg (186 lb 1.1 oz) (04/10/24 1335)  Body mass index is 24.55 kg/m².  Body surface area is 2.08 meters squared.    I/O last 3 completed shifts:  In: 771 [I.V.:396.5; Blood:350; IV Piggyback:24.5]  Out: 1100 [Urine:1100]     Physical Exam  Vitals and nursing note reviewed.   Constitutional:       General: He is not in acute distress.     Appearance: Normal appearance. He is not ill-appearing or toxic-appearing.   HENT:      Head: Normocephalic and atraumatic.      Nose: No congestion or rhinorrhea.      Mouth/Throat:      Mouth: Mucous membranes are moist.      Pharynx: No oropharyngeal exudate or posterior oropharyngeal erythema.   Eyes:      General: No scleral icterus.        Right eye: No discharge.         Left eye: No discharge.      Extraocular Movements: Extraocular movements intact.      Conjunctiva/sclera: Conjunctivae normal.   Cardiovascular:      Rate and Rhythm: Normal rate. Rhythm irregular.      Heart sounds: No murmur heard.     No friction rub. No gallop.      Comments: MIRA AVF ++  Pulmonary:      Effort: Pulmonary effort is normal. No respiratory distress.      Breath sounds: No wheezing or rales.   Abdominal:      General: Bowel sounds are normal. There is no distension.      Palpations: Abdomen is soft.      Tenderness: There is no abdominal tenderness.   Musculoskeletal:         General: No swelling.      Cervical back: Normal range of motion and neck supple.      Right lower leg: No edema.      Left lower leg: No edema.   Skin:      General: Skin is warm and dry.   Neurological:      General: No focal deficit present.      Mental Status: He is alert and oriented to person, place, and time.          Significant Labs:  CBC:   Recent Labs   Lab 04/12/24  0311   WBC 6.43   RBC 2.79*   HGB 6.8*   HCT 21.9*      MCV 79*   MCH 24.4*   MCHC 31.1*     CMP:   Recent Labs   Lab 04/09/24  1157 04/09/24  2219 04/12/24  0311   GLU 92   < > 102   CALCIUM 8.4*   < > 7.3*   ALBUMIN 3.0*  --   --    PROT 6.8  --   --       < > 138   K 3.6   < > 3.5   CO2 20*   < > 25      < > 104   BUN 29*   < > 33*   CREATININE 5.8*   < > 5.9*   ALKPHOS 81  --   --    ALT 17  --   --    AST 16  --   --    BILITOT 0.6  --   --     < > = values in this interval not displayed.

## 2024-04-12 NOTE — ASSESSMENT & PLAN NOTE
Patient underwent coronary angiography 02/2024 and was found to have multivessel CAD. CTS had declined patient for CABG and patient under PCI to the RCA on 02/28/2024. Mid LAD 70% + OM1/OM2 70% obstruction remained medically managed as thought was that culprit vessel (RCA) was addressed.   Trop on admission 0.035 --> 0.4 --> 1.9-->3.78  Cardiology consulted, likely type II demand    - Trop trended to peak  - TTE EF 45-50%  - 48 hours heparin gtt, per ACS. Ready to resume oral anticoagulation  - continue high intensity statin  - continue beta blocker  - NTG SL 0.4  q 5 min PRN for chest discomfort  - Cardiac telemetry  - Stable for discharge

## 2024-04-17 RX ORDER — FUROSEMIDE 80 MG/1
80 TABLET ORAL EVERY MORNING
Qty: 90 TABLET | Refills: 1 | OUTPATIENT
Start: 2024-04-17

## 2024-04-22 ENCOUNTER — PATIENT MESSAGE (OUTPATIENT)
Dept: INTERNAL MEDICINE | Facility: CLINIC | Age: 69
End: 2024-04-22
Payer: MEDICARE

## 2024-04-23 NOTE — TELEPHONE ENCOUNTER
Able to reach the patient's wife to explain to her the absence of Dr. Colindres of not currently being in clinic.     Informed patient it is advised through covering provider to be seen as soon as possible for the risk of worsening conditions.     Patient's wife agreed , scheduled an appointment for tomorrow

## 2024-04-24 ENCOUNTER — OFFICE VISIT (OUTPATIENT)
Dept: INTERNAL MEDICINE | Facility: CLINIC | Age: 69
End: 2024-04-24
Payer: MEDICARE

## 2024-04-24 VITALS
SYSTOLIC BLOOD PRESSURE: 142 MMHG | OXYGEN SATURATION: 99 % | HEIGHT: 75 IN | HEART RATE: 64 BPM | BODY MASS INDEX: 22.23 KG/M2 | WEIGHT: 178.81 LBS | DIASTOLIC BLOOD PRESSURE: 68 MMHG

## 2024-04-24 DIAGNOSIS — M25.521 PAIN OF BOTH ELBOWS: ICD-10-CM

## 2024-04-24 DIAGNOSIS — Z09 HOSPITAL DISCHARGE FOLLOW-UP: ICD-10-CM

## 2024-04-24 DIAGNOSIS — I21.4 NSTEMI (NON-ST ELEVATED MYOCARDIAL INFARCTION): Primary | ICD-10-CM

## 2024-04-24 DIAGNOSIS — I50.33 ACUTE ON CHRONIC DIASTOLIC HEART FAILURE: ICD-10-CM

## 2024-04-24 DIAGNOSIS — M25.522 PAIN OF BOTH ELBOWS: ICD-10-CM

## 2024-04-24 DIAGNOSIS — I48.91 ATRIAL FIBRILLATION WITH RVR: ICD-10-CM

## 2024-04-24 DIAGNOSIS — I13.0 HYPERTENSIVE HEART AND RENAL DISEASE WITH RENAL FAILURE, STAGE 1 THROUGH STAGE 4 OR UNSPECIFIED CHRONIC KIDNEY DISEASE, WITH HEART FAILURE: ICD-10-CM

## 2024-04-24 PROCEDURE — 99999 PR PBB SHADOW E&M-EST. PATIENT-LVL III: CPT | Mod: PBBFAC,,, | Performed by: INTERNAL MEDICINE

## 2024-04-24 PROCEDURE — 3078F DIAST BP <80 MM HG: CPT | Mod: CPTII,S$GLB,, | Performed by: INTERNAL MEDICINE

## 2024-04-24 PROCEDURE — 1101F PT FALLS ASSESS-DOCD LE1/YR: CPT | Mod: CPTII,S$GLB,, | Performed by: INTERNAL MEDICINE

## 2024-04-24 PROCEDURE — 1126F AMNT PAIN NOTED NONE PRSNT: CPT | Mod: CPTII,S$GLB,, | Performed by: INTERNAL MEDICINE

## 2024-04-24 PROCEDURE — 3288F FALL RISK ASSESSMENT DOCD: CPT | Mod: CPTII,S$GLB,, | Performed by: INTERNAL MEDICINE

## 2024-04-24 PROCEDURE — 1111F DSCHRG MED/CURRENT MED MERGE: CPT | Mod: CPTII,S$GLB,, | Performed by: INTERNAL MEDICINE

## 2024-04-24 PROCEDURE — 3066F NEPHROPATHY DOC TX: CPT | Mod: CPTII,S$GLB,, | Performed by: INTERNAL MEDICINE

## 2024-04-24 PROCEDURE — 3044F HG A1C LEVEL LT 7.0%: CPT | Mod: CPTII,S$GLB,, | Performed by: INTERNAL MEDICINE

## 2024-04-24 PROCEDURE — 3008F BODY MASS INDEX DOCD: CPT | Mod: CPTII,S$GLB,, | Performed by: INTERNAL MEDICINE

## 2024-04-24 PROCEDURE — 99214 OFFICE O/P EST MOD 30 MIN: CPT | Mod: S$GLB,,, | Performed by: INTERNAL MEDICINE

## 2024-04-24 PROCEDURE — 3077F SYST BP >= 140 MM HG: CPT | Mod: CPTII,S$GLB,, | Performed by: INTERNAL MEDICINE

## 2024-04-24 NOTE — PROGRESS NOTES
"Subjective:       Patient ID: Ibrahima Phelps is a 68 y.o. male.    Chief Complaint: hospital f/u and Hypertension    HPI    Transitional Care Note    Admitted from 4/9 to 4/12 for NSTEMI.     Per hospital discharge summary:    "In the emergency department: ECG with inferolateral ST depressions. Initial troponin 0.035 --> 0.4 --> 1.9. BNP > 3000. BUN/Cr 29/5.8. WBC 6. Stable H/H at 8.2/27. Admitted to hospital medicine with cardiology consulted. He was started on ACS protocol at the recommendation of cardiology. Troponin trended until stable    Overnight 04/09 rapid response activated for respiratory distress and flash pulmonary edema. Wife at bedside states he became acutely short of breath. Pt tripoding at bedside, satting in the 90s on nasal cannula, BP with systolic 170s. Pt transferred to MICU for NiPPV and iHD, respiratory status improved without being placed on NiPPV. Now on room air. Dialyzed last night, 2L taken off, patient currently without pain or shortness of breath. On room air, no pressors. Stepped down to floor 4/10. Pt c/o severe R ankle pain on 4/11. Uric acid within normal limits, and R ankle X-ray without fracture or dislocation. On 4/12, patient received dialysis; also received 1 unit PRBCs for hgb 6.8. At this time, patient is stable for discharge with cardiology and PCP follow up. Holding antihypertensives until patient is able to follow up with PCP and cardiology. Continuing metoprolol and amiodarone for atrial fibrillation.       Was on hydralazine and nifedipine which has been held since discharge. BP at home has been around 140s.     His elbows have been bothering him bilaterally but otherwise he is feeling much better.     Has resumed dialysis and is doing well.         Family and/or Caretaker present at visit?  Yes.  Diagnostic tests reviewed/disposition: No diagnosic tests pending after this hospitalization.  Disease/illness education: yes  Home health/community services " discussion/referrals: Patient does not have home health established from hospital visit.  They do not need home health.  If needed, we will set up home health for the patient.   Establishment or re-establishment of referral orders for community resources:  follow up with cardiology .   Discussion with other health care providers: No discussion with other health care providers necessary.        Review of Systems   Constitutional:  Negative for chills, diaphoresis, fatigue and fever.   HENT:  Negative for rhinorrhea, sneezing and sore throat.    Respiratory:  Negative for cough, chest tightness, shortness of breath and wheezing.    Cardiovascular:  Negative for chest pain, palpitations and leg swelling.   Gastrointestinal:  Negative for abdominal pain, blood in stool, diarrhea, nausea and vomiting.   Musculoskeletal:  Negative for arthralgias and back pain.   Neurological:  Negative for dizziness, weakness, light-headedness and headaches.   Psychiatric/Behavioral:  Negative for agitation and behavioral problems.          Objective:      Physical Exam  Constitutional:       Appearance: Normal appearance.   HENT:      Head: Normocephalic and atraumatic.   Cardiovascular:      Rate and Rhythm: Normal rate and regular rhythm.      Heart sounds: Normal heart sounds.   Pulmonary:      Effort: Pulmonary effort is normal.      Breath sounds: Normal breath sounds. No stridor. No wheezing or rales.   Abdominal:      General: Abdomen is flat.      Palpations: Abdomen is soft. There is no mass.      Tenderness: There is no abdominal tenderness.   Skin:     General: Skin is warm and dry.   Neurological:      Mental Status: He is alert and oriented to person, place, and time.   Psychiatric:         Mood and Affect: Mood normal.         Assessment:       Problem List Items Addressed This Visit          Cardiac/Vascular    NSTEMI (non-ST elevated myocardial infarction) - Primary    Relevant Orders    Ambulatory referral/consult to  Cardiology    Hypertensive heart and kidney disease    Relevant Orders    Ambulatory referral/consult to Cardiology     Other Visit Diagnoses       Hospital discharge follow-up        Pain of both elbows        Relevant Orders    Ambulatory referral/consult to Orthopedics            Plan:       Ibrahima was seen today for hospital f/u and hypertension.    Diagnoses and all orders for this visit:  Hospital discharge follow-up  NSTEMI (non-ST elevated myocardial infarction)  -     Ambulatory referral/consult to Cardiology; Future  Will get sooner appt with cardiology    Hypertensive heart and renal disease with renal failure, stage 1 through stage 4 or unspecified chronic kidney disease, with heart failure  -     Ambulatory referral/consult to Cardiology; Future  His wife will monitor BP at home, if remains in 140s will restart hydralazine TID.       Pain of both elbows  -     Ambulatory referral/consult to Orthopedics; Future

## 2024-04-26 ENCOUNTER — PATIENT MESSAGE (OUTPATIENT)
Dept: NEPHROLOGY | Facility: CLINIC | Age: 69
End: 2024-04-26
Payer: MEDICARE

## 2024-04-29 ENCOUNTER — OFFICE VISIT (OUTPATIENT)
Dept: CARDIOLOGY | Facility: CLINIC | Age: 69
End: 2024-04-29
Payer: MEDICARE

## 2024-04-29 ENCOUNTER — HOSPITAL ENCOUNTER (OUTPATIENT)
Dept: CARDIOLOGY | Facility: CLINIC | Age: 69
Discharge: HOME | End: 2024-04-29
Payer: MEDICARE

## 2024-04-29 VITALS
SYSTOLIC BLOOD PRESSURE: 173 MMHG | HEART RATE: 61 BPM | WEIGHT: 180.56 LBS | DIASTOLIC BLOOD PRESSURE: 74 MMHG | BODY MASS INDEX: 22.45 KG/M2 | OXYGEN SATURATION: 98 % | HEIGHT: 75 IN

## 2024-04-29 DIAGNOSIS — I70.0 AORTIC ATHEROSCLEROSIS: ICD-10-CM

## 2024-04-29 DIAGNOSIS — I35.0 MODERATE AORTIC STENOSIS: ICD-10-CM

## 2024-04-29 DIAGNOSIS — I48.91 ATRIAL FIBRILLATION, UNSPECIFIED TYPE: ICD-10-CM

## 2024-04-29 DIAGNOSIS — E78.2 MIXED HYPERLIPIDEMIA: ICD-10-CM

## 2024-04-29 DIAGNOSIS — Z94.0 H/O KIDNEY TRANSPLANT: ICD-10-CM

## 2024-04-29 DIAGNOSIS — I50.20 HFREF (HEART FAILURE WITH REDUCED EJECTION FRACTION): ICD-10-CM

## 2024-04-29 DIAGNOSIS — I48.0 PAROXYSMAL ATRIAL FIBRILLATION: ICD-10-CM

## 2024-04-29 DIAGNOSIS — I10 PRIMARY HYPERTENSION: ICD-10-CM

## 2024-04-29 DIAGNOSIS — N18.6 ESRD (END STAGE RENAL DISEASE): ICD-10-CM

## 2024-04-29 DIAGNOSIS — I77.9 CAROTID ARTERY DISEASE, UNSPECIFIED LATERALITY, UNSPECIFIED TYPE: ICD-10-CM

## 2024-04-29 DIAGNOSIS — Z79.01 LONG TERM (CURRENT) USE OF ANTICOAGULANTS: ICD-10-CM

## 2024-04-29 DIAGNOSIS — Z95.5 H/O HEART ARTERY STENT: ICD-10-CM

## 2024-04-29 DIAGNOSIS — I25.10 CORONARY ARTERY DISEASE INVOLVING NATIVE CORONARY ARTERY OF NATIVE HEART WITHOUT ANGINA PECTORIS: Primary | Chronic | ICD-10-CM

## 2024-04-29 LAB
OHS QRS DURATION: 98 MS
OHS QTC CALCULATION: 415 MS

## 2024-04-29 PROCEDURE — 1111F DSCHRG MED/CURRENT MED MERGE: CPT | Mod: CPTII,S$GLB,, | Performed by: PHYSICIAN ASSISTANT

## 2024-04-29 PROCEDURE — 3078F DIAST BP <80 MM HG: CPT | Mod: CPTII,S$GLB,, | Performed by: PHYSICIAN ASSISTANT

## 2024-04-29 PROCEDURE — 3288F FALL RISK ASSESSMENT DOCD: CPT | Mod: CPTII,S$GLB,, | Performed by: PHYSICIAN ASSISTANT

## 2024-04-29 PROCEDURE — 1159F MED LIST DOCD IN RCRD: CPT | Mod: CPTII,S$GLB,, | Performed by: PHYSICIAN ASSISTANT

## 2024-04-29 PROCEDURE — 93005 ELECTROCARDIOGRAM TRACING: CPT

## 2024-04-29 PROCEDURE — 3044F HG A1C LEVEL LT 7.0%: CPT | Mod: CPTII,S$GLB,, | Performed by: PHYSICIAN ASSISTANT

## 2024-04-29 PROCEDURE — 93010 ELECTROCARDIOGRAM REPORT: CPT | Mod: ,,, | Performed by: INTERNAL MEDICINE

## 2024-04-29 PROCEDURE — 3008F BODY MASS INDEX DOCD: CPT | Mod: CPTII,S$GLB,, | Performed by: PHYSICIAN ASSISTANT

## 2024-04-29 PROCEDURE — 1126F AMNT PAIN NOTED NONE PRSNT: CPT | Mod: CPTII,S$GLB,, | Performed by: PHYSICIAN ASSISTANT

## 2024-04-29 PROCEDURE — 99999 PR PBB SHADOW E&M-EST. PATIENT-LVL III: CPT | Mod: PBBFAC,,, | Performed by: PHYSICIAN ASSISTANT

## 2024-04-29 PROCEDURE — 3066F NEPHROPATHY DOC TX: CPT | Mod: CPTII,S$GLB,, | Performed by: PHYSICIAN ASSISTANT

## 2024-04-29 PROCEDURE — 99214 OFFICE O/P EST MOD 30 MIN: CPT | Mod: S$GLB,,, | Performed by: PHYSICIAN ASSISTANT

## 2024-04-29 PROCEDURE — 1101F PT FALLS ASSESS-DOCD LE1/YR: CPT | Mod: CPTII,S$GLB,, | Performed by: PHYSICIAN ASSISTANT

## 2024-04-29 PROCEDURE — 3077F SYST BP >= 140 MM HG: CPT | Mod: CPTII,S$GLB,, | Performed by: PHYSICIAN ASSISTANT

## 2024-04-29 RX ORDER — METOPROLOL SUCCINATE 25 MG/1
12.5 TABLET, EXTENDED RELEASE ORAL DAILY
Qty: 45 TABLET | Refills: 3
Start: 2024-04-29 | End: 2025-04-29

## 2024-04-29 RX ORDER — CLOPIDOGREL BISULFATE 75 MG/1
75 TABLET ORAL DAILY
Qty: 90 TABLET | Refills: 3 | Status: SHIPPED | OUTPATIENT
Start: 2024-04-29 | End: 2025-04-29

## 2024-04-29 NOTE — PROGRESS NOTES
Cardiology Clinic Note  Reason for Visit: Hospital follow up     HPI:     PROBLEM LIST:  PAF  CAD s/p PCI 2006  - PCI to RCA w/ 2 DIEGO 2/28/2024  HFpEF  HTN  HLD  Moderate AS  Carotid stenoses (20-39% bilateral)  H/o pericarditis  CKD3  S/p kidney transplant  Cigarette smoker      Ibrahima Phelps is a 68 y.o. M, who presents for hospital follow up. He presented to the ED 4/9 with chest pain and was discharged 4/12. Troponin trended up to 1.9, and initial ECG showed inferolateral ST depression. He became acutely short of breath due to flash pulmonary edema and had emergent dialysis, which improved his symptoms. He was also anemic and received a transfusion. Hydralazine 50 mg TID and nifedipine 60 mg BID were held at discharge. He was advised by his PCP to start back on hydralazine if home BP > 140/90, which has not happened yet. He denies chest pain and shortness of breath today.       MARISA Jackson HPI 3/4/2024  Presents for hospital follow up. He was discharged following PCI two days ago. He denies any chest pain or shortness of breath. He understands the recommendation to continue ASA, plavix and eliquis for one month, followed by plavix and eliquis indefinitely. His dose of atorvastatin was increased to 80 mg during recent hospitalization. He is currently dealing with an episode of gout. He continues outpatient HD x 3 days.       Hospital Course:   History of ESRD status post renal transplant x2 initially presented for chest pain, NSTEMI and on ACS protocol.  Admitted to CCU, LHC was initially planned however pt was noted to not be fully aware of procedural risks and in addition due to renal dysfunction therefore LHC was aborted.  Eventually done 2/26.  Found to have multivessel disease.  CT surgery consulted, deemed to not be surgical candidate. Now going for planned multivessel PCI on 2/28. Had been hypervolemic on exam, likely requiring dialysis. CXR with pulmonary edema and increased size of small right  pleural effusion. Fistula had previously created on 12/2023 with plan for HD in the future.  Was recently discharged on 02/21, HD recommended by KTM team however pt had declined. KTM currently on board, pt now agreeable for dialysis. First session was on 2/24/24. Patient underwent successful PCI to RCA w/2 DIEGO 02/28. He was awaiting outpatient HD chair- [facility] w/chair, but unable to complete admission process until Tuesday, 03/05, for bed- patient on T/Thr/Sat schedule- patient underwent HD 03/02. He was deemed stable for discharge afterwards. HD chair ready for outpatient utilization. Discussed need to continue asa/plavix/eliquis x1mo- then plavix/eliquis indefinitely- will arrange outpatient cards f/u. Discussed tacro taper per transplant nephro recommendations. Lasix to be utilized PRN vs. Scheduled with re-initiation of HD.      Pt deemed appropriate for discharge. Plan discussed with pt, who was agreeable and amenable; medications were discussed and reviewed, outpatient follow-up scheduled, ER precautions were given, all questions were answered to the pt's satisfaction, and Mr. Phelps was subsequently discharged.    ROS:    Pertinent ROS included in HPI and below.  PMH:     Past Medical History:   Diagnosis Date    Atrial fibrillation     CAD (coronary artery disease) 2006    MI in 2006    CHF (congestive heart failure) 2017    CKD (chronic kidney disease) stage 3, GFR 30-59 ml/min     Dr. Latif.  in transplanted kidney    CKD (chronic kidney disease) stage 5, GFR less than 15 ml/min 02/02/2024    COVID-19 02/07/2023    Diverticulosis     Hyperlipidemia     Hypertension     Keloid cicatrix     Metabolic bone disease     Other emphysema 10/01/2019    Pericarditis     S/P kidney transplant 1992    x2 (1992 & 2005),    Thrombocytopenia     Thyroid disease     Tobacco use 09/05/2014     Past Surgical History:   Procedure Laterality Date    AV FISTULA PLACEMENT Left 12/18/2023    Procedure: CREATION, AV  "FISTULA;  Surgeon: JUANI Melendez III, MD;  Location: [facility] OR 2ND FLR;  Service: Vascular;  Laterality: Left;  LUE AVF creation vs AVG insertion    CARDIOVERSION  04/30/15    CHOLECYSTECTOMY      COLONOSCOPY  April 20, 2011    Diverticulosis, repeat recommended in 3 yrs., repeat colonoscopy 2014 revealed 2 polyps.  He should return in 5 years.    COLONOSCOPY N/A 3/13/2020    Procedure: COLONOSCOPY;  Surgeon: Chon Casper MD;  Location: [facility] ENDO (4TH FLR);  Service: Endoscopy;  Laterality: N/A;  ok to hold coumadin x5days-see telephone encounter 2/4/20-tb    COLONOSCOPY N/A 2/4/2021    Procedure: COLONOSCOPY;  Surgeon: Chon Casper MD;  Location: [facility] (4TH FLR);  Service: Endoscopy;  Laterality: N/A;  Eliquis - per Dr. GAVIN chua to hold x 2 days and "restarted asap"- ERW  last prep "poor", [id number] ordered/ Pt requests Dr. Casper  prep ins. mailed - COVID screening on 2/1/21 PCW- ERW    COLONOSCOPY N/A 3/3/2021    Procedure: COLONOSCOPY;  Surgeon: Chon Casper MD;  Location: [facility] (4TH FLR);  Service: Endoscopy;  Laterality: N/A;  Patient with his second poor bowel pre and has poor prep today.  If patient not intersted or can't get colonoscopy tomorrow will need constipation bowel prep and will need to restart his Eliquis today.Thanks,Chon     per Dr Blunt-ok to hold Eliquis 2 days prior      COVID     CORONARY ANGIOGRAPHY N/A 2/28/2024    Procedure: ANGIOGRAM, CORONARY ARTERY;  Surgeon: Tylor Lincoln MD;  Location: [facility] CATH LAB;  Service: Cardiology;  Laterality: N/A;    CORONARY ANGIOPLASTY WITH STENT PLACEMENT  9/13/2006    FISTULOGRAM N/A 2/19/2024    Procedure: Fistulogram;  Surgeon: JUANI Melendez III, MD;  Location: [facility] CATH LAB;  Service: Peripheral Vascular;  Laterality: N/A;    IRRIGATION AND DEBRIDEMENT Right 8/30/2023    Procedure: IRRIGATION AND DEBRIDEMENT, RIGHT MIDDLE FINGER;  Surgeon: Martin Larsen MD;  Location: [facility] OR [address] [place] [address];  Service: " Orthopedics;  Laterality: Right;    KIDNEY TRANSPLANT  2005    LEFT HEART CATHETERIZATION N/A 2/26/2024    Procedure: Left heart cath;  Surgeon: Tylor Lincoln MD;  Location: [facility] CATH LAB;  Service: Cardiology;  Laterality: N/A;    PARATHYROIDECTOMY      PERCUTANEOUS TRANSLUMINAL ANGIOPLASTY OF ARTERIOVENOUS FISTULA Left 2/19/2024    Procedure: PTA, AV FISTULA;  Surgeon: JUANI Melendez III, MD;  Location: [facility] CATH LAB;  Service: Peripheral Vascular;  Laterality: Left;    STENT, DRUG ELUTING, SINGLE VESSEL, CORONARY N/A 2/28/2024    Procedure: Stent, Drug Eluting, Single Vessel, Coronary;  Surgeon: Tylor Lincoln MD;  Location: [facility] CATH LAB;  Service: Cardiology;  Laterality: N/A;    TREATMENT OF CARDIAC ARRHYTHMIA N/A 3/28/2022    Procedure: CARDIOVERSION;  Surgeon: Migue Blunt MD;  Location: [facility] EP LAB;  Service: Cardiology;  Laterality: N/A;  AF, DCC, MAC, GP, 3 PREP*RODRIGO deferred pt 100% compliant*     Allergies:     Review of patient's allergies indicates:   Allergen Reactions    Ace inhibitors Swelling    Verapamil Other (See Comments)     Other reaction(s): Unknown    Povidone-iodine Itching     Medications:     Current Outpatient Medications on File Prior to Visit   Medication Sig Dispense Refill    acetaminophen (TYLENOL) 500 MG tablet Take 1 tablet (500 mg total) by mouth every 6 (six) hours as needed for Pain. 20 tablet 0    albuterol (VENTOLIN HFA) 90 mcg/actuation inhaler Inhale 2 puffs into the lungs every 6 (six) hours as needed for Wheezing or Shortness of Breath. Rescue 18 g 3    albuterol-ipratropium (DUO-NEB) 2.5 mg-0.5 mg/3 mL nebulizer solution Take 3 mLs by nebulization every 4 (four) hours as needed for Wheezing. Rescue 75 mL 0    amiodarone (PACERONE) 200 MG Tab Take 1 tablet (200 mg total) by mouth once daily. 30 tablet 11    apixaban (ELIQUIS) 5 mg Tab Take 1 tablet (5 mg total) by mouth 2 (two) times daily. 180 tablet 3    atorvastatin (LIPITOR) 80 MG tablet Take 1 tablet  (80 mg total) by mouth once daily. 90 tablet 3    b complex vitamins (B COMPLEX-VITAMIN B12) tablet Take 1 tablet by mouth once daily. 90 tablet 3    calcium carbonate (CALCIUM 600 ORAL) Take 1 tablet by mouth 2 (two) times a day.      CHOLECALCIFEROL, VITAMIN D3, ORAL Take 2 tablets by mouth 2 (two) times daily.      doxazosin (CARDURA) 4 MG tablet Take 1 tablet (4 mg total) by mouth every 12 (twelve) hours. 180 tablet 3    famotidine (PEPCID) 20 MG tablet Take 1 tablet (20 mg total) by mouth 2 (two) times daily. 180 tablet 3    fexofenadine HCl (ALLEGRA ORAL) Take 1 tablet by mouth daily as needed (allergies).      fluticasone furoate-vilanteroL (BREO) 100-25 mcg/dose diskus inhaler Inhale 1 puff into the lungs once daily. Controller. Use this inhaler every day.      fluticasone propionate (FLONASE) 50 mcg/actuation nasal spray 1 spray (50 mcg total) by Each Nostril route daily as needed for Allergies.      furosemide (LASIX) 20 MG tablet Take 4 tablets (80 mg total) by mouth 2 (two) times daily as needed. For worsening LE swelling or shortness of breath on non-dialysis days 60 tablet 11    gabapentin (NEURONTIN) 100 MG capsule Take 1 capsule (100 mg total) by mouth as needed (pain).      MITIGARE 0.6 mg Cap TAKE 1 CAPSULE BY MOUTH TWICE A DAY AS NEEDED GOUT FLARE UP TO 3 DAYS 15 capsule 11    multivitamin (THERAGRAN) per tablet Take 1 tablet by mouth Daily.      nitroGLYCERIN (NITROSTAT) 0.4 MG SL tablet Place 1 tablet (0.4 mg total) under the tongue every 5 (five) minutes as needed for Chest pain. 25 tablet 0    omeprazole (PRILOSEC) 20 MG capsule Take 1 capsule (20 mg total) by mouth daily as needed (heartburn).      paricalcitoL (ZEMPLAR) 1 MCG capsule TAKE 1 CAPSULE ON MONDAY, WEDNESDAY AND FRIDAY 36 capsule 3    predniSONE (DELTASONE) 5 MG tablet Take 1 tablet (5 mg total) by mouth once daily. 90 tablet 3    pulse oximeter (PULSE OXIMETER) device by Apply Externally route 2 (two) times a day. Use twice daily  at 8 AM and 3 PM and record the value in [employer] as directed. 1 each 0    sevelamer carbonate (RENVELA) 800 mg Tab Take 1 tablet (800 mg total) by mouth 3 (three) times daily with meals. 90 tablet 11    suvorexant (BELSOMRA) 5 mg Tab Take 5 mg by mouth nightly as needed (insomnia). 30 tablet 2    tacrolimus (PROGRAF) 1 MG Cap Take 3 capsules (3 mg total) by mouth every 12 (twelve) hours for 60 days, THEN 2 capsules (2 mg total) every 12 (twelve) hours for 60 days, THEN 1 capsule (1 mg total) every 12 (twelve) hours for 60 days, THEN 1 capsule (1 mg total) once daily. 540 capsule 4    tiotropium bromide (SPIRIVA RESPIMAT) 1.25 mcg/actuation inhaler Inhale 2 puffs into the lungs once daily. Controller. Use this inhaler every day. 4 g 11    [DISCONTINUED] clopidogreL (PLAVIX) 75 mg tablet Take 1 tablet (75 mg total) by mouth once daily. 30 tablet 11    hydrALAZINE (APRESOLINE) 50 MG tablet Take 1 tablet (50 mg total) by mouth 3 (three) times daily. Hold until follow up with PCP or cardiologist (Patient not taking: Reported on 2024) 270 tablet 3    NIFEdipine (PROCARDIA-XL) 60 MG (OSM) 24 hr tablet Take 1 tablet (60 mg total) by mouth 2 (two) times a day. Hold until follow up with PCP or cardiologist (Patient not taking: Reported on 2024) 180 tablet 3    [DISCONTINUED] aspirin (ECOTRIN) 81 MG EC tablet Take 1 tablet (81 mg total) by mouth once daily. 30 tablet 0    [DISCONTINUED] metoprolol succinate (TOPROL-XL) 25 MG 24 hr tablet Take 0.5 tablets (12.5 mg total) by mouth once daily. (Patient not taking: Reported on 2024) 15 tablet 11     No current facility-administered medications on file prior to visit.     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     Patient is currently retired   Substance Use Topics    Alcohol use: No     Family History:     Family History   Problem Relation Name Age of Onset    No Known Problems Sister      No Known Problems Brother      Thyroid disease Mother          s/p surgery    Heart disease Father          had pacemaker    No Known Problems Sister      Kidney failure Sister      Kidney disease Sister      No Known Problems Sister      Kidney disease Brother      Kidney failure Brother          s/p transplant    Diabetes Mellitus Neg Hx      Stroke Neg Hx      Heart attack Neg Hx      Cancer Neg Hx      Celiac disease Neg Hx      Cirrhosis Neg Hx      Colon cancer Neg Hx      Esophageal cancer Neg Hx      Inflammatory bowel disease Neg Hx      Rectal cancer Neg Hx      Stomach cancer Neg Hx      Ulcerative colitis Neg Hx      Liver disease Neg Hx      Liver cancer Neg Hx      Crohn's disease Neg Hx      Melanoma Neg Hx       Physical Exam:   BP (!) 173/74   Pulse 61   Ht 6' 3" (1.905 m)   Wt 81.9 kg (180 lb 8.9 oz)   SpO2 98%   BMI 22.57 kg/m²      Physical Exam  Vitals and nursing note reviewed.   Constitutional:       Appearance: Normal appearance.   HENT:      Head: Normocephalic and atraumatic.   Neck:      Vascular: Normal carotid pulses. No carotid bruit or hepatojugular reflux.   Cardiovascular:      Rate and Rhythm: Normal rate and regular rhythm.      Chest Wall: PMI is not displaced.      Pulses:           Radial pulses are 2+ on the right side and 2+ on the left side.        Dorsalis pedis pulses are 2+ on the right side and 2+ on the left side.        Posterior tibial pulses are 2+ on the right side and 2+ on the left side.      Heart sounds: Murmur heard.      Harsh midsystolic murmur is present with a grade of 1/6 at the upper right sternal border radiating to the neck.   Pulmonary:      " Effort: Pulmonary effort is normal.      Breath sounds: Normal breath sounds. No wheezing, rhonchi or rales.   Abdominal:      General: Bowel sounds are normal. There is no abdominal bruit.      Palpations: Abdomen is soft. There is no pulsatile mass.      Tenderness: There is no abdominal tenderness.   Feet:      Right foot:      Skin integrity: Skin integrity normal.      Left foot:      Skin integrity: Skin integrity normal.   Skin:     Capillary Refill: Capillary refill takes less than 2 seconds.   Neurological:      General: No focal deficit present.      Mental Status: He is alert.   Psychiatric:         Mood and Affect: Mood and affect normal.         Speech: Speech normal.         Behavior: Behavior is cooperative.         Thought Content: Thought content normal.          Labs:     Blood Tests:  Lab Results   Component Value Date    BNP 3,075 (H) 04/09/2024     04/12/2024    K 3.5 04/12/2024     04/12/2024    CO2 25 04/12/2024    BUN 33 (H) 04/12/2024    CREATININE 5.9 (H) 04/12/2024     04/12/2024    HGBA1C 5.0 04/09/2024    MG 2.3 04/12/2024    AST 16 04/09/2024    ALT 17 04/09/2024    ALBUMIN 3.0 (L) 04/09/2024    PROT 6.8 04/09/2024    BILITOT 0.6 04/09/2024    WBC 6.43 04/12/2024    HGB 6.8 (L) 04/12/2024    HCT 21.9 (L) 04/12/2024    HCT 27 (L) 04/09/2024    MCV 79 (L) 04/12/2024     04/12/2024    INR 1.3 (H) 04/09/2024    TSH 0.859 04/09/2024       Lab Results   Component Value Date    CHOL 152 01/04/2023    HDL 47 01/04/2023    TRIG 88 01/04/2023       Lab Results   Component Value Date    LDLCALC 87.4 01/04/2023         Imaging:     Echocardiogram  TTE 4/10/2024    Left Ventricle: The left ventricle is normal in size. Normal wall thickness. There is mildly reduced systolic function with a visually estimated ejection fraction of 45 - 50%. There is normal diastolic function.    Right Ventricle: Normal right ventricular cavity size. Wall thickness is normal. Right ventricle  wall motion  is normal. Systolic function is normal.    Left Atrium: Left atrium is mildly dilated.    Right Atrium: Right atrium is mildly dilated.    Aortic Valve: There is mild stenosis. Aortic valve area by VTI is 2.03 cm². Aortic valve peak velocity is 2.73 m/s. Mean gradient is 15 mmHg. The dimensionless index is 0.49. There is mild aortic regurgitation.    Pulmonary Artery: There is mild pulmonary hypertension. The estimated pulmonary artery systolic pressure is 40 mmHg.    IVC/SVC: Normal venous pressure at 3 mmHg.    TTE 2/26/2024    Left Ventricle: There is normal systolic function with a visually estimated ejection fraction of 60 - 65%. Grade II diastolic dysfunction. Elevated left ventricular filling pressure.    Right Ventricle: Normal right ventricular cavity size. Wall thickness is normal. Right ventricle wall motion  is normal. Systolic function is normal.    Left Atrium: Left atrium is moderately dilated.    Right Atrium: Right atrium is moderately dilated.    Aortic Valve: The aortic valve is a trileaflet valve. There is moderate aortic valve sclerosis. There is moderate annular calcification present. There is mild to moderate aortic regurgitation. There appers to be mobile echodensoty attached to the base of the non coronary leaflet that is likely nodular calcificaiton and is tthe site of origin of Aortic regurgitation. This appears unchanged from pror TTE. AV leaflets appears thickened in some views cannot exlude presence of endocarditis (clip # 5).    Aortic Valve: Mildly restricted motion. There is mild stenosis. Aortic valve area by VTI is 1.62 cm². Aortic valve peak velocity is 2.62 m/s. Mean gradient is 14 mmHg. The dimensionless index is 0.47.    Mitral Valve: There is moderate mitral annular calcification present. There is mild to moderate regurgitation.    Aorta: Aortic root is normal in size measuring 3.61 cm. Ascending aorta is normal measuring 2.80 cm.    Pulmonary Artery: The  estimated pulmonary artery systolic pressure is 49 mmHg.    IVC/SVC: Intermediate venous pressure at 8 mmHg.    Stress testing  SPECT stress test 4/28/2022    Normal myocardial perfusion scan. There is no evidence of myocardial ischemia or infarction.    The visually estimated ejection fraction is normal at rest and normal during stress.    There is normal wall motion at rest and post stress.    LV cavity size is normal at rest and normal at stress.    The EKG portion of this study is abnormal but not diagnostic.    The patient reported no chest pain during the stress test.    There were no arrhythmias during stress.    When compared to the previous study from 6/29/2020, there are no significant changes.    Cath Lab  [facility] 2/28/2024    The Mid RCA lesion was 80% stenosed with 0% stenosis post-intervention.    The Prox RCA lesion was 99% stenosed with 0% stenosis post-intervention.    Prox RCA lesion: A .    Prox RCA lesion, Mid RCA lesion: A .    Prox RCA lesion, Mid RCA lesion: A STENT SYNERGY XD [id number] stent was successfully placed.    Mid RCA lesion: A STENT SYNERGY XD 2.5X8MM stent was successfully placed.    The estimated blood loss was none.    Assessment:     1. Coronary artery disease involving native coronary artery of native heart without angina pectoris    2. H/O heart artery stent    3. Aortic atherosclerosis    4. Mixed hyperlipidemia    5. Paroxysmal atrial fibrillation    6. Long term (current) use of anticoagulants [V58.61]    7. Carotid artery disease, unspecified laterality, unspecified type    8. Primary hypertension    9. HFrEF (heart failure with reduced ejection fraction)    10. H/O kidney transplant    11. ESRD (end stage renal disease)    12. Moderate aortic stenosis        Plan:     Coronary artery disease involving native coronary artery of native heart without angina pectoris  H/O heart artery stent  Aortic atherosclerosis  Continue atorvastatin 80 mg qD  Plavix and eliquis indefinitely      Mixed hyperlipidemia  Check FLP    Paroxysmal atrial fibrillation  Long term (current) use of anticoagulants [V58.61]  Continue eliquis and amiodarone   Follows with Dr. Parmar     Carotid artery disease, unspecified laterality, unspecified type  Bilateral stenosis 1-39% per carotid US 7/2023  Continue plavix and statin   Continue to monitor   Recommend smoking cessation     Primary hypertension  Agree with restarting hydralazine for home BP > 140/90    HFrEF  Continue metoprolol succinate 12.5 mg qD  Allergy to ACEi  Limit dietary sodium to < 2g/day   Limit fluid intake to <1500 cc/day   Daily weights     H/O kidney transplant  ESRD (end stage renal disease)  Follows with nephrology     Moderate aortic stenosis  Repeat echo annually      Signed:  Amanda Jackson PA-C  Cardiology     4/29/2024 8:48 AM    Follow-up:     Future Appointments   Date Time Provider Department Center   5/1/2024  3:00 PM Ana Garcia MD [facility] ID [facility]   6/10/2024  8:30 AM LAB, APPOINTMENT [facility] INTMED [facility] LAB IM [facility]   7/26/2024 10:00 AM VASCULAR, LAB [facility] VASCLAB [facility]   7/26/2024 10:30 AM JUANI Melendez III, MD [facility] VASCSUR [facility]   8/12/2024 10:20 AM BE Parmar MD [facility] ARRHYTH [facility]   8/14/2024  8:00 AM Loyda Gambino MD [facility] NEPHRO [facility]

## 2024-05-01 ENCOUNTER — PATIENT OUTREACH (OUTPATIENT)
Dept: ADMINISTRATIVE | Facility: HOSPITAL | Age: 69
End: 2024-05-01
Payer: MEDICARE

## 2024-05-01 NOTE — PROGRESS NOTES
Received message via [facility] in box that patient would like to schedule colonoscopy.  Patient has referral to endo  for colonoscopy already entered in chart

## 2024-05-16 ENCOUNTER — TELEPHONE (OUTPATIENT)
Dept: VASCULAR SURGERY | Facility: CLINIC | Age: 69
End: 2024-05-16
Payer: MEDICARE

## 2024-05-16 NOTE — TELEPHONE ENCOUNTER
Contacted pt's wife Shea in response to message. Appointment for [facility] scheduled, wife verified.   ----- Message from Amie Parr sent at 5/16/2024 10:29 AM CDT -----  Regarding: reschedule request  PATIENT CALL    Pt's wife Ms Valdivia called regarding [facility]. She went online and moved his appt with the provider to 05/24, would like to know if the vascular lab appt can be moved up as well. Please call back at [phone]

## 2024-05-16 NOTE — PHYSICIAN QUERY
Question:Provider, Please Specify the              Type of Myocardial Infarction         Provider Query Response:  NSTEMI      To Respond To Query  Respond from In Basket  Navigate to the Hospital Chart Completion folder.  Highlight the Physician Query message and click New Note to complete the query.   Response appears in a new window.  After reviewing the chart, respond to the query by selecting the clinically appropriate option from the list.  Click Sign  Unselected options will disappear after signing the note.

## 2024-05-16 NOTE — PHYSICIAN QUERY
Provider, please specify the Acuity of the       Diastolic (HFpEF) heart failure      ..  Acute on Chronic Diastolic Heart Failure (HFpEF)      To Respond To Query  Respond from In Basket  Navigate to the Hospital Chart Completion folder.  Highlight the Physician Query message and click New Note to complete the query.   Response appears in a new window.  After reviewing the chart, respond to the query by selecting the clinically appropriate option from the list.  Click Sign  Unselected options will disappear after signing the note.

## 2024-05-18 ENCOUNTER — OFFICE VISIT (OUTPATIENT)
Dept: URGENT CARE | Facility: CLINIC | Age: 69
End: 2024-05-18
Payer: MEDICARE

## 2024-05-18 VITALS
RESPIRATION RATE: 17 BRPM | DIASTOLIC BLOOD PRESSURE: 71 MMHG | WEIGHT: 180 LBS | SYSTOLIC BLOOD PRESSURE: 127 MMHG | HEIGHT: 75 IN | BODY MASS INDEX: 22.38 KG/M2 | OXYGEN SATURATION: 98 % | TEMPERATURE: 99 F | HEART RATE: 88 BPM

## 2024-05-18 DIAGNOSIS — M77.8 TRICEPS TENDINITIS: Primary | ICD-10-CM

## 2024-05-18 PROCEDURE — 99213 OFFICE O/P EST LOW 20 MIN: CPT | Mod: S$GLB,,, | Performed by: SURGERY

## 2024-05-18 NOTE — PROGRESS NOTES
"Subjective:      Patient ID: Ibrahima Phelps is a 68 y.o. male.    Vitals:  height is 6' 3" (1.905 m) and weight is 81.6 kg (180 lb). His oral temperature is 98.6 °F (37 °C). His blood pressure is 127/71 and his pulse is 88. His respiration is 17 and oxygen saturation is 98%.     Chief Complaint: Shoulder Pain    This is a 68 y.o. male who presents today with a chief complaint of right shoulder pain radiating to his arm, patient states pain started on and off over three weeks ago, no injury to the arm and patient states he is not taking any medications to relief pain.    Shoulder Pain   The pain is present in the right shoulder and right arm. This is a new problem. The current episode started in the past 7 days. There has been no history of extremity trauma. The problem occurs constantly. The problem has been unchanged. The pain is at a severity of 7/10. The pain is moderate. Associated symptoms include an inability to bear weight and a limited range of motion. Pertinent negatives include no joint locking, joint swelling, numbness, stiffness, tingling or visual symptoms.       Musculoskeletal:  Positive for abnormal ROM of joint.   Neurological:  Negative for numbness.      Objective:     Physical Exam   Constitutional: He is oriented to person, place, and time. He appears well-developed. He is cooperative.   HENT:   Head: Normocephalic and atraumatic.   Ears:   Right Ear: Hearing, tympanic membrane, external ear and ear canal normal.   Left Ear: Hearing, tympanic membrane, external ear and ear canal normal.   Nose: Nose normal. No mucosal edema or nasal deformity. No epistaxis. Right sinus exhibits no maxillary sinus tenderness and no frontal sinus tenderness. Left sinus exhibits no maxillary sinus tenderness and no frontal sinus tenderness.   Mouth/Throat: Uvula is midline, oropharynx is clear and moist and mucous membranes are normal. No trismus in the jaw. Normal dentition. No uvula swelling.   Eyes: Conjunctivae " and lids are normal.   Neck: Trachea normal and phonation normal. Neck supple.   Cardiovascular: Normal rate, regular rhythm, normal heart sounds and normal pulses.   Pulmonary/Chest: Effort normal and breath sounds normal.   Abdominal: Normal appearance and bowel sounds are normal. Soft.   Musculoskeletal:      Right upper arm: He exhibits tenderness.        Arms:    Neurological: He is alert and oriented to person, place, and time. He exhibits normal muscle tone.   Skin: Skin is warm, dry and intact.   Psychiatric: His speech is normal and behavior is normal. Judgment and thought content normal.   Nursing note and vitals reviewed.      Assessment:     1. Triceps tendinitis        Plan:       Triceps tendinitis  -     Ambulatory referral/consult to Orthopedics

## 2024-05-22 ENCOUNTER — HOSPITAL ENCOUNTER (OUTPATIENT)
Dept: RADIOLOGY | Facility: HOSPITAL | Age: 69
Discharge: HOME | End: 2024-05-22
Attending: PHYSICIAN ASSISTANT
Payer: MEDICARE

## 2024-05-22 ENCOUNTER — OFFICE VISIT (OUTPATIENT)
Dept: ORTHOPEDICS | Facility: CLINIC | Age: 69
End: 2024-05-22
Payer: MEDICARE

## 2024-05-22 DIAGNOSIS — M25.511 RIGHT SHOULDER PAIN, UNSPECIFIED CHRONICITY: ICD-10-CM

## 2024-05-22 DIAGNOSIS — M25.511 RIGHT SHOULDER PAIN, UNSPECIFIED CHRONICITY: Primary | ICD-10-CM

## 2024-05-22 DIAGNOSIS — M25.521 RIGHT ELBOW PAIN: ICD-10-CM

## 2024-05-22 PROCEDURE — 73080 X-RAY EXAM OF ELBOW: CPT | Mod: 26,HCNC,RT, | Performed by: RADIOLOGY

## 2024-05-22 PROCEDURE — 73030 X-RAY EXAM OF SHOULDER: CPT | Mod: 26,HCNC,RT, | Performed by: RADIOLOGY

## 2024-05-22 PROCEDURE — 3044F HG A1C LEVEL LT 7.0%: CPT | Mod: HCNC,CPTII,S$GLB, | Performed by: PHYSICIAN ASSISTANT

## 2024-05-22 PROCEDURE — 99214 OFFICE O/P EST MOD 30 MIN: CPT | Mod: HCNC,S$GLB,, | Performed by: PHYSICIAN ASSISTANT

## 2024-05-22 PROCEDURE — 1101F PT FALLS ASSESS-DOCD LE1/YR: CPT | Mod: HCNC,CPTII,S$GLB, | Performed by: PHYSICIAN ASSISTANT

## 2024-05-22 PROCEDURE — 73080 X-RAY EXAM OF ELBOW: CPT | Mod: TC,HCNC,RT

## 2024-05-22 PROCEDURE — 3288F FALL RISK ASSESSMENT DOCD: CPT | Mod: HCNC,CPTII,S$GLB, | Performed by: PHYSICIAN ASSISTANT

## 2024-05-22 PROCEDURE — 99999 PR PBB SHADOW E&M-EST. PATIENT-LVL IV: CPT | Mod: PBBFAC,HCNC,, | Performed by: PHYSICIAN ASSISTANT

## 2024-05-22 PROCEDURE — 3066F NEPHROPATHY DOC TX: CPT | Mod: HCNC,CPTII,S$GLB, | Performed by: PHYSICIAN ASSISTANT

## 2024-05-22 PROCEDURE — 73030 X-RAY EXAM OF SHOULDER: CPT | Mod: TC,HCNC,RT

## 2024-05-22 PROCEDURE — 1159F MED LIST DOCD IN RCRD: CPT | Mod: HCNC,CPTII,S$GLB, | Performed by: PHYSICIAN ASSISTANT

## 2024-05-22 PROCEDURE — 1160F RVW MEDS BY RX/DR IN RCRD: CPT | Mod: HCNC,CPTII,S$GLB, | Performed by: PHYSICIAN ASSISTANT

## 2024-05-22 NOTE — PROGRESS NOTES
Chief Complaint & History of Present Illness:    Ibrahima Phelps is a Established patient 68 y.o. male who is seen here today with a complaint of    Chief Complaint   Patient presents with    Right Shoulder - Pain    .  He is patient well-known to us last seen treated the clinic 09/13/2023.  His concerns today revolve around pain soreness in the right elbow with some radiation up the humerus and to a lesser degree into the forearm.  There has been no specific trauma or injury to the area but does have point tenderness at the lateral epicondylar region with radiation around the entire elbow region.  There is no marked swelling or induration no evidence infection no fevers chills or constitutional symptoms.  States he is increased pain with resisted range of motion  primarily with flexion  On a scale of 1-10, with 10 being worst pain imaginable, he rates this pain as 4 on good days and 6 on bad days.  he describes the pain as  tender and aching.    Review of patient's allergies indicates:   Allergen Reactions    Ace inhibitors Swelling    Verapamil Other (See Comments)     Other reaction(s): Unknown    Povidone-iodine Itching         Current Outpatient Medications   Medication Sig Dispense Refill    acetaminophen (TYLENOL) 500 MG tablet Take 1 tablet (500 mg total) by mouth every 6 (six) hours as needed for Pain. 20 tablet 0    albuterol (VENTOLIN HFA) 90 mcg/actuation inhaler Inhale 2 puffs into the lungs every 6 (six) hours as needed for Wheezing or Shortness of Breath. Rescue 18 g 3    albuterol-ipratropium (DUO-NEB) 2.5 mg-0.5 mg/3 mL nebulizer solution Take 3 mLs by nebulization every 4 (four) hours as needed for Wheezing. Rescue 75 mL 0    amiodarone (PACERONE) 200 MG Tab Take 1 tablet (200 mg total) by mouth once daily. 30 tablet 11    apixaban (ELIQUIS) 5 mg Tab Take 1 tablet (5 mg total) by mouth 2 (two) times daily. 180 tablet 3    atorvastatin (LIPITOR) 80 MG tablet Take 1 tablet (80 mg total) by mouth once  daily. 90 tablet 3    b complex vitamins (B COMPLEX-VITAMIN B12) tablet Take 1 tablet by mouth once daily. 90 tablet 3    calcium carbonate (CALCIUM 600 ORAL) Take 1 tablet by mouth 2 (two) times a day.      CHOLECALCIFEROL, VITAMIN D3, ORAL Take 2 tablets by mouth 2 (two) times daily.      clopidogreL (PLAVIX) 75 mg tablet Take 1 tablet (75 mg total) by mouth once daily. 90 tablet 3    doxazosin (CARDURA) 4 MG tablet Take 1 tablet (4 mg total) by mouth every 12 (twelve) hours. 180 tablet 3    famotidine (PEPCID) 20 MG tablet Take 1 tablet (20 mg total) by mouth 2 (two) times daily. 180 tablet 3    fexofenadine HCl (ALLEGRA ORAL) Take 1 tablet by mouth daily as needed (allergies).      fluticasone furoate-vilanteroL (BREO) 100-25 mcg/dose diskus inhaler Inhale 1 puff into the lungs once daily. Controller. Use this inhaler every day.      fluticasone propionate (FLONASE) 50 mcg/actuation nasal spray 1 spray (50 mcg total) by Each Nostril route daily as needed for Allergies.      furosemide (LASIX) 20 MG tablet Take 4 tablets (80 mg total) by mouth 2 (two) times daily as needed. For worsening LE swelling or shortness of breath on non-dialysis days 60 tablet 11    gabapentin (NEURONTIN) 100 MG capsule Take 1 capsule (100 mg total) by mouth as needed (pain).      hydrALAZINE (APRESOLINE) 50 MG tablet Take 1 tablet (50 mg total) by mouth 3 (three) times daily. Hold until follow up with PCP or cardiologist 270 tablet 3    metoprolol succinate (TOPROL-XL) 25 MG 24 hr tablet Take 0.5 tablets (12.5 mg total) by mouth once daily. 45 tablet 3    multivitamin (THERAGRAN) per tablet Take 1 tablet by mouth Daily.      NIFEdipine (PROCARDIA-XL) 60 MG (OSM) 24 hr tablet Take 1 tablet (60 mg total) by mouth 2 (two) times a day. Hold until follow up with PCP or cardiologist 180 tablet 3    nitroGLYCERIN (NITROSTAT) 0.4 MG SL tablet Place 1 tablet (0.4 mg total) under the tongue every 5 (five) minutes as needed for Chest pain. 25  tablet 0    omeprazole (PRILOSEC) 20 MG capsule Take 1 capsule (20 mg total) by mouth daily as needed (heartburn).      paricalcitoL (ZEMPLAR) 1 MCG capsule TAKE 1 CAPSULE ON MONDAY, WEDNESDAY AND FRIDAY 36 capsule 3    predniSONE (DELTASONE) 5 MG tablet Take 1 tablet (5 mg total) by mouth once daily. 90 tablet 3    pulse oximeter (PULSE OXIMETER) device by Apply Externally route 2 (two) times a day. Use twice daily at 8 AM and 3 PM and [facility] as directed. 1 each 0    sevelamer carbonate (RENVELA) 800 mg Tab Take 1 tablet (800 mg total) by mouth 3 (three) times daily with meals. 90 tablet 11    suvorexant (BELSOMRA) 5 mg Tab Take 5 mg by mouth nightly as needed (insomnia). 30 tablet 2    tacrolimus (PROGRAF) 1 MG Cap Take 3 capsules (3 mg total) by mouth every 12 (twelve) hours for 60 days, THEN 2 capsules (2 mg total) every 12 (twelve) hours for 60 days, THEN 1 capsule (1 mg total) every 12 (twelve) hours for 60 days, THEN 1 capsule (1 mg total) once daily. 540 capsule 4    tiotropium bromide (SPIRIVA RESPIMAT) 1.25 mcg/actuation inhaler Inhale 2 puffs into the lungs once daily. Controller. Use this inhaler every day. 4 g 11     No current facility-administered medications for this visit.       Past Medical History:   Diagnosis Date    Atrial fibrillation     CAD (coronary artery disease) 2006    MI in 2006    CHF (congestive heart failure) 2017    CKD (chronic kidney disease) stage 3, GFR 30-59 ml/min     Dr. Ltaif.  in transplanted kidney    CKD (chronic kidney disease) stage 5, GFR less than 15 ml/min 02/02/2024    COVID-19 02/07/2023    Diverticulosis     Hyperlipidemia     Hypertension     Keloid cicatrix     Metabolic bone disease     Other emphysema 10/01/2019    Pericarditis     S/P kidney transplant 1992    x2 (1992 & 2005),    Thrombocytopenia     Thyroid disease     Tobacco use 09/05/2014       Past Surgical History:   Procedure Laterality Date    AV FISTULA PLACEMENT Left  "12/18/2023    Procedure: CREATION, AV FISTULA;  Surgeon: JUANI Melendez III, MD;  Location: [facility] OR 2ND FLR;  Service: Vascular;  Laterality: Left;  LUE AVF creation vs AVG insertion    CARDIOVERSION  04/30/15    CHOLECYSTECTOMY      COLONOSCOPY  April 20, 2011    Diverticulosis, repeat recommended in 3 yrs., repeat colonoscopy 2014 revealed 2 polyps.  He should return in 5 years.    COLONOSCOPY N/A 3/13/2020    Procedure: COLONOSCOPY;  Surgeon: Chon Casper MD;  Location: [facility] (4TH FLR);  Service: Endoscopy;  Laterality: N/A;  ok to hold coumadin x5days-see telephone encounter 2/4/20-tb    COLONOSCOPY N/A 2/4/2021    Procedure: COLONOSCOPY;  Surgeon: Chon Casper MD;  Location: [facility] (4TH FLR);  Service: Endoscopy;  Laterality: N/A;  Eliquis - per Dr. GAVIN Blunt ok to hold x 2 days and "restarted asap"- ERW  last prep "poor", [id number] ordered/ Pt requests Dr. Casper  prep ins. mailed - COVID screening on 2/1/21 PCW- ERW    COLONOSCOPY N/A 3/3/2021    Procedure: COLONOSCOPY;  Surgeon: Chon Casper MD;  Location: [facility] ENDO (4TH FLR);  Service: Endoscopy;  Laterality: N/A;  Patient with his second poor bowel pre and has poor prep today.  If patient not intersted or can't get colonoscopy tomorrow will need constipation bowel prep and will need to restart his Eliquis today.Thanks,Chon     per Dr Blunt-ok to hold Eliquis 2 days prior      COVID     CORONARY ANGIOGRAPHY N/A 2/28/2024    Procedure: ANGIOGRAM, CORONARY ARTERY;  Surgeon: Tylor Lincoln MD;  Location: [facility] CATH LAB;  Service: Cardiology;  Laterality: N/A;    CORONARY ANGIOPLASTY WITH STENT PLACEMENT  9/13/2006    FISTULOGRAM N/A 2/19/2024    Procedure: Fistulogram;  Surgeon: JUANI Melendez III, MD;  Location: [facility] CATH LAB;  Service: Peripheral Vascular;  Laterality: N/A;    IRRIGATION AND DEBRIDEMENT Right 8/30/2023    Procedure: IRRIGATION AND DEBRIDEMENT, RIGHT MIDDLE FINGER;  Surgeon: Martin Larsen MD;  Location: " [facility] OR 2ND FLR;  Service: Orthopedics;  Laterality: Right;    KIDNEY TRANSPLANT  2005    LEFT HEART CATHETERIZATION N/A 2/26/2024    Procedure: Left heart cath;  Surgeon: Tylor Lincoln MD;  Location: [facility] CATH LAB;  Service: Cardiology;  Laterality: N/A;    PARATHYROIDECTOMY      PERCUTANEOUS TRANSLUMINAL ANGIOPLASTY OF ARTERIOVENOUS FISTULA Left 2/19/2024    Procedure: PTA, AV FISTULA;  Surgeon: JUANI Melendez III, MD;  Location: [facility] CATH LAB;  Service: Peripheral Vascular;  Laterality: Left;    STENT, DRUG ELUTING, SINGLE VESSEL, CORONARY N/A 2/28/2024    Procedure: Stent, Drug Eluting, Single Vessel, Coronary;  Surgeon: Tylor Lincoln MD;  Location: [facility] CATH LAB;  Service: Cardiology;  Laterality: N/A;    TREATMENT OF CARDIAC ARRHYTHMIA N/A 3/28/2022    Procedure: CARDIOVERSION;  Surgeon: Migue Blunt MD;  Location: [facility] EP LAB;  Service: Cardiology;  Laterality: N/A;  AF, DCC, MAC, GP, 3 PREP*RODRIGO deferred pt 100% compliant*       Vital Signs (Most Recent)  There were no vitals filed for this visit.        Review of Systems:  ROS:  Constitutional: no fever or chills, palliative care encounter, former smoker,  Eyes: no visual changes  ENT: no nasal congestion or sore throat  Respiratory: no cough or shortness of breath, positive shortness of breath, multiple lung nodules on CT, hypovolemic respiratory failure, consolidation lung, calcified granuloma of the lung, flash pulmonary edema  Cardiovascular: no chest pain or palpitations, positive coronary artery disease, AFib, carotid artery disease, pulmonary hypertension, primary hypertension, carotid artery bruit, hypertensive heart and kidney disease, aortic atherosclerosis cirrhosis, chronic heart failure with preserved ejection fraction, status post NSTEMI, symptomatic bradycardia atrial fib with right ventricular response  Gastrointestinal: no nausea or vomiting, tolerating diet, history of adenomatous polyps of the colon, GERD  Genitourinary: no  hematuria or dysuria, positive history of kidney transplant secondary to end-stage renal disease long-term immunosuppression  Integument/Breast: no rash or pruritis  Hematologic/Lymphatic: no easy bruising or lymphadenopathy, positive for pyogenic arthritis of the hand   prophylactic immunotherapy, immunocompromised state due to drug therapy long-term anticoagulation, thrombocytopenia, anemia and stage 5 kidney disease  Musculoskeletal: no arthralgias or myalgias, positive calcific tendinitis left shoulder, rotator cuff tear left shoulder impingement syndrome of the left shoulder, chronic right ankle pain  Neurological: no seizures or tremors, benign essential intention tremor, postural tremor, polyneuropathy, memory changes  Behavioral/Psych: no auditory or visual hallucinations  Endocrine: no heat or cold intolerance, secondary renal hyperparathyroidism, glucose intolerance, phosphorus metabolism disorder                OBJECTIVE:     PHYSICAL EXAM:     , General Appearance: Well nourished, well developed, in no acute distress.  Neurological: Mood & affect are normal.  right  Elbow:   Pain: Description: moderate and severe  Night pain: yes and moderate  Rest pain: yes and moderate  Quality: aching and stabbing  Location: diffuse  Mass/swelling: none  Neurological complaints: none  Vascular complaints: none  lateral epicondylar tenderness, olecranon tenderness, radial pulse normal, negative Cozen  ROM: flexion arc 0-140, pronation 85, and supination 90  Strength: normal, flexion grade 5 of 5, extension grade 5 of 5, supination grade 5 of 5, and pronation grade 5 of 5      RADIOGRAPHS:   X-rays of the elbow taken today films reviewed by me demonstrate moderate arthritic changes throughout the elbow with significant osteophytic spurring and sclerotic changes within the elbow joint no evidence of fracture dislocation    ASSESSMENT/PLAN:       ICD-10-CM ICD-9-CM   1. Right shoulder pain, unspecified chronicity  M25.511  719.41       Plan: We discussed with the patient at length all the different treatment options available for his elbow including anti-inflammatories, acetaminophen, rest, ice, physical therapy to include strengthening, range of motion exercise,  splinting,  occasional cortisone injections for temporary relief,  or possible surgical interventions.    Patient appears to have lateral olecranon bursitis but additional pain and tenderness within the elbow joint proper secondary to his moderate to severe arthritis with this in mind will consult to Sports Medicine for evaluation for possible ultrasound-guided injection into the joint itself

## 2024-05-23 ENCOUNTER — TELEPHONE (OUTPATIENT)
Dept: SPORTS MEDICINE | Facility: CLINIC | Age: 69
End: 2024-05-23
Payer: MEDICARE

## 2024-05-23 NOTE — TELEPHONE ENCOUNTER
Called pt to schedule R elbow jt injection, left VM.    Tere Watkins MS, OTC  Clinical Assistant to Dr. Yamel Ahmadi      ----- Message from Kathy Alicea MA sent at 2024 11:50 AM CDT -----  Regarding: FW: Appt  Contact: Shea/wife [phone]  Hi again I,am so  sorry its the patient rt. elbow , not the shoulder  ----- Message -----  From: Tere Watkins  Sent: 2024  11:25 AM CDT  To: Kathy Alicea MA  Subject: RE: Appt                                         Just want to clarify what type of shoulder injection - glenohumeral, subacromial, AC joint, biceps tendon sheath? Please let us know.    Thanks!  ----- Message -----  From: Kathy Alicea MA  Sent: 2024  11:22 AM CDT  To: Tere Watkins  Subject: FW: Appt                                         Hi ,Patient need a rt. Shoulder injection with ultrasound guided .  Thanks  ----- Message -----  From: Tere Watkins  Sent: 2024  10:59 AM CDT  To: Abelino Bartholomew Staff  Subject: FW: Appt                                         Good morning,    We received this message regarding pt wanting to be scheduled for an injection. We just want to clarify what type of injection the pt is being referred for. Please advise.    Thanks!  Tere Watkins MS, OTC  Clinical Assistant to Dr. Yamel Ahmadi  ----- Message -----  From: Kerri Phan  Sent: 2024  10:51 AM CDT  To: Tere Watkins; Tran Doll Staff; #  Subject: RE: Appt                                         Both of the Linda are out all of next week and returning the week of .     Jenelle Phan MS, OTC  Clinical Assistant to Dr. Lavon Mayfield  ----- Message -----  From: Tere Watkins  Sent: 2024  10:45 AM CDT  To: Tran Doll Staff; Tran Garrison Staff  Subject: RE: Appt                                         Hi all,    Dr. Ahmadi is out of office until next week -- happy to get him scheduled with us if no one has availability until then.     Thanks!  Tere  MS June, OTC  Clinical Assistant to Dr. Yamel Ahmadi  ----- Message -----  From: Alejandro Vera  Sent: 5/23/2024   9:11 AM CDT  To: Tran Doll Staff; Tran Garrison Staff; #  Subject: Appt                                             Kathy reyna/MARISA Roca is calling to request appt for right shoulder injection, pt will need to be seen on M, W, F due to dialysis Tu, Th, please call Shea/wife @[phone]

## 2024-05-23 NOTE — TELEPHONE ENCOUNTER
Returned call, pt's wife elected to be scheduled on 5/29.    Tere Watkins MS, OTC  Clinical Assistant to Dr. Yamel Ahmadi      ----- Message from Anuradha Feliciano MA sent at 5/23/2024  1:53 PM CDT -----  Regarding: returning  call  Contact: pt [phone]  Type:  Patient Returning Call    Who Called: Sam   Who Left Message for Patient: Tere   Does the patient know what this is regarding?: appt for injection   Would the patient rather a call back or a response via [facility]?  Call back  Best Call Back Number [phone]   Additional Information:

## 2024-05-24 ENCOUNTER — HOSPITAL ENCOUNTER (OUTPATIENT)
Dept: VASCULAR SURGERY | Facility: CLINIC | Age: 69
Discharge: HOME | End: 2024-05-24
Attending: SURGERY
Payer: MEDICARE

## 2024-05-24 ENCOUNTER — OFFICE VISIT (OUTPATIENT)
Dept: VASCULAR SURGERY | Facility: CLINIC | Age: 69
End: 2024-05-24
Payer: MEDICARE

## 2024-05-24 ENCOUNTER — DOCUMENTATION ONLY (OUTPATIENT)
Dept: VASCULAR SURGERY | Facility: CLINIC | Age: 69
End: 2024-05-24

## 2024-05-24 VITALS
BODY MASS INDEX: 23.59 KG/M2 | WEIGHT: 174.19 LBS | DIASTOLIC BLOOD PRESSURE: 67 MMHG | HEART RATE: 58 BPM | SYSTOLIC BLOOD PRESSURE: 145 MMHG | HEIGHT: 72 IN | TEMPERATURE: 98 F

## 2024-05-24 DIAGNOSIS — N18.5 CKD (CHRONIC KIDNEY DISEASE), STAGE V: ICD-10-CM

## 2024-05-24 DIAGNOSIS — N18.6 ESRD (END STAGE RENAL DISEASE) ON DIALYSIS: Primary | ICD-10-CM

## 2024-05-24 DIAGNOSIS — T82.510A PSEUDOANEURYSM OF ARTERIOVENOUS DIALYSIS FISTULA, INITIAL ENCOUNTER: Primary | ICD-10-CM

## 2024-05-24 DIAGNOSIS — Z99.2 ESRD (END STAGE RENAL DISEASE) ON DIALYSIS: Primary | ICD-10-CM

## 2024-05-24 PROCEDURE — 1126F AMNT PAIN NOTED NONE PRSNT: CPT | Mod: HCNC,CPTII,S$GLB, | Performed by: SURGERY

## 2024-05-24 PROCEDURE — 1160F RVW MEDS BY RX/DR IN RCRD: CPT | Mod: HCNC,CPTII,S$GLB, | Performed by: SURGERY

## 2024-05-24 PROCEDURE — 3288F FALL RISK ASSESSMENT DOCD: CPT | Mod: HCNC,CPTII,S$GLB, | Performed by: SURGERY

## 2024-05-24 PROCEDURE — 99999 PR PBB SHADOW E&M-EST. PATIENT-LVL V: CPT | Mod: PBBFAC,HCNC,, | Performed by: SURGERY

## 2024-05-24 PROCEDURE — 3077F SYST BP >= 140 MM HG: CPT | Mod: HCNC,CPTII,S$GLB, | Performed by: SURGERY

## 2024-05-24 PROCEDURE — 3044F HG A1C LEVEL LT 7.0%: CPT | Mod: HCNC,CPTII,S$GLB, | Performed by: SURGERY

## 2024-05-24 PROCEDURE — 1159F MED LIST DOCD IN RCRD: CPT | Mod: HCNC,CPTII,S$GLB, | Performed by: SURGERY

## 2024-05-24 PROCEDURE — 3078F DIAST BP <80 MM HG: CPT | Mod: HCNC,CPTII,S$GLB, | Performed by: SURGERY

## 2024-05-24 PROCEDURE — 99214 OFFICE O/P EST MOD 30 MIN: CPT | Mod: HCNC,S$GLB,, | Performed by: SURGERY

## 2024-05-24 PROCEDURE — 3066F NEPHROPATHY DOC TX: CPT | Mod: HCNC,CPTII,S$GLB, | Performed by: SURGERY

## 2024-05-24 PROCEDURE — 1101F PT FALLS ASSESS-DOCD LE1/YR: CPT | Mod: HCNC,CPTII,S$GLB, | Performed by: SURGERY

## 2024-05-24 PROCEDURE — 3008F BODY MASS INDEX DOCD: CPT | Mod: HCNC,CPTII,S$GLB, | Performed by: SURGERY

## 2024-05-24 NOTE — PROGRESS NOTES
VASCULAR SURGERY SERVICE    REFERRING DOCTOR: Emre Latif MD    CHIEF COMPLAINT: CKD 5    HISTORY OF PRESENT ILLNESS: Ibrahima Phelps is a 68 y.o. male status post 2 kidney transplantations the last in 2005, now with the client renal function with a GFR of 9.7 not yet initiated hemodialysis.  He is right-handed.  He would a Veronica AV fistula in the left many many years ago.    S/p    PTA, left AV fistula 02/19/2024  left brachiocephalic AV fistula creation 12/18/2023    He is taking Eliquis for atrial fibrillation.    He is no history of AICD or pacemaker placement    02/06/2024:  This is initial follow-up visit status post left brachiocephalic AV fistula creation 12/18/2023.  He has not initiated hemodialysis.  Denies any hand pain weakness or numbness.  This fistula was felt likely that he had a outflow vein stenosis in the cephalic vein    03/22/2024:  This is postoperative appointment after angioplasty of his left AV fistula 1 month ago.  He subsequently initiated hemodialysis proximally 3 weeks ago.  He would 1 episode of infiltration last week but none since    05/24/2024:  Now presents for follow-up.  Since then notes that his hemodialysis has been working through his fistula without any sort of issues.  No more issues of infiltration, heavy bleeding.  Notes that the hematoma that was present prior in March as still been there, largely unchanged.      Past Medical History:   Diagnosis Date    Atrial fibrillation     CAD (coronary artery disease) 2006    MI in 2006    CHF (congestive heart failure) 2017    CKD (chronic kidney disease) stage 3, GFR 30-59 ml/min     Dr. Latif.  in transplanted kidney    CKD (chronic kidney disease) stage 5, GFR less than 15 ml/min 02/02/2024    COVID-19 02/07/2023    Diverticulosis     Hyperlipidemia     Hypertension     Keloid cicatrix     Metabolic bone disease     Other emphysema 10/01/2019    Pericarditis     S/P kidney transplant 1992    x2 (1992 & 2005),     "Thrombocytopenia     Thyroid disease     Tobacco use 09/05/2014       Past Surgical History:   Procedure Laterality Date    AV FISTULA PLACEMENT Left 12/18/2023    Procedure: CREATION, AV FISTULA;  Surgeon: JUANI Melendez III, MD;  Location: [facility] OR [facility] FLR;  Service: Vascular;  Laterality: Left;  LUE AVF creation vs AVG insertion    CARDIOVERSION  04/30/15    CHOLECYSTECTOMY      COLONOSCOPY  April 20, 2011    Diverticulosis, repeat recommended in 3 yrs., repeat colonoscopy 2014 revealed 2 polyps.  He should return in 5 years.    COLONOSCOPY N/A 3/13/2020    Procedure: COLONOSCOPY;  Surgeon: Chon Casper MD;  Location: [facility] ENDO (4TH FLR);  Service: Endoscopy;  Laterality: N/A;  ok to hold coumadin x5days-see telephone encounter 2/4/20-tb    COLONOSCOPY N/A 2/4/2021    Procedure: COLONOSCOPY;  Surgeon: Chon Casper MD;  Location: [facility] ENDO (4TH FLR);  Service: Endoscopy;  Laterality: N/A;  Eliquis - per Dr. GAVIN Blunt ok to hold x 2 days and "restarted asap"- ERW  last prep "poor", [id number] ordered/ Pt requests Dr. Casper  prep ins. mailed - COVID screening on 2/1/21 PCW- ERW    COLONOSCOPY N/A 3/3/2021    Procedure: COLONOSCOPY;  Surgeon: Chon Casper MD;  Location: [facility] ENDO (4TH FLR);  Service: Endoscopy;  Laterality: N/A;  Patient with his second poor bowel pre and has poor prep today.  If patient not intersted or can't get colonoscopy tomorrow will need constipation bowel prep and will need to restart his Eliquis today.Thanks,Chon Blunt-ok to hold Eliquis 2 days prior      COVID     CORONARY ANGIOGRAPHY N/A 2/28/2024    Procedure: ANGIOGRAM, CORONARY ARTERY;  Surgeon: Tylor Lincoln MD;  Location: [facility] CATH LAB;  Service: Cardiology;  Laterality: N/A;    CORONARY ANGIOPLASTY WITH STENT PLACEMENT  9/13/2006    FISTULOGRAM N/A 2/19/2024    Procedure: Fistulogram;  Surgeon: JUANI Melendez III, MD;  Location: [facility] CATH LAB;  Service: Peripheral Vascular;  Laterality: N/A;    " IRRIGATION AND DEBRIDEMENT Right 8/30/2023    Procedure: IRRIGATION AND DEBRIDEMENT, RIGHT MIDDLE FINGER;  Surgeon: Martin Larsen MD;  [facility];  Service: Orthopedics;  Laterality: Right;    KIDNEY TRANSPLANT  2005    LEFT HEART CATHETERIZATION N/A 2/26/2024    Procedure: Left heart cath;  Surgeon: Tylor Lincoln MD;  Location: [facility] CATH LAB;  Service: Cardiology;  Laterality: N/A;    PARATHYROIDECTOMY      PERCUTANEOUS TRANSLUMINAL ANGIOPLASTY OF ARTERIOVENOUS FISTULA Left 2/19/2024    Procedure: PTA, AV FISTULA;  Surgeon: JUANI Melendez III, MD;  Location: [facility] CATH LAB;  Service: Peripheral Vascular;  Laterality: Left;    STENT, DRUG ELUTING, SINGLE VESSEL, CORONARY N/A 2/28/2024    Procedure: Stent, Drug Eluting, Single Vessel, Coronary;  Surgeon: Tylor Lincoln MD;  Location: [facility] CATH LAB;  Service: Cardiology;  Laterality: N/A;    TREATMENT OF CARDIAC ARRHYTHMIA N/A 3/28/2022    Procedure: CARDIOVERSION;  Surgeon: Migue Blunt MD;  Location: [facility] EP LAB;  Service: Cardiology;  Laterality: N/A;  AF, DCC, MAC, GP, 3 PREP*RODRIGO deferred pt 100% compliant*         Current Outpatient Medications:     acetaminophen (TYLENOL) 500 MG tablet, Take 1 tablet (500 mg total) by mouth every 6 (six) hours as needed for Pain., Disp: 20 tablet, Rfl: 0    albuterol (VENTOLIN HFA) 90 mcg/actuation inhaler, Inhale 2 puffs into the lungs every 6 (six) hours as needed for Wheezing or Shortness of Breath. Rescue, Disp: 18 g, Rfl: 3    albuterol-ipratropium (DUO-NEB) 2.5 mg-0.5 mg/3 mL nebulizer solution, Take 3 mLs by nebulization every 4 (four) hours as needed for Wheezing. Rescue, Disp: 75 mL, Rfl: 0    amiodarone (PACERONE) 200 MG Tab, Take 1 tablet (200 mg total) by mouth once daily., Disp: 30 tablet, Rfl: 11    apixaban (ELIQUIS) 5 mg Tab, Take 1 tablet (5 mg total) by mouth 2 (two) times daily., Disp: 180 tablet, Rfl: 3    atorvastatin (LIPITOR) 80 MG tablet, Take 1 tablet (80 mg total)  by mouth once daily., Disp: 90 tablet, Rfl: 3    b complex vitamins (B COMPLEX-VITAMIN B12) tablet, Take 1 tablet by mouth once daily., Disp: 90 tablet, Rfl: 3    calcium carbonate (CALCIUM 600 ORAL), Take 1 tablet by mouth 2 (two) times a day., Disp: , Rfl:     CHOLECALCIFEROL, VITAMIN D3, ORAL, Take 2 tablets by mouth 2 (two) times daily., Disp: , Rfl:     clopidogreL (PLAVIX) 75 mg tablet, Take 1 tablet (75 mg total) by mouth once daily., Disp: 90 tablet, Rfl: 3    doxazosin (CARDURA) 4 MG tablet, Take 1 tablet (4 mg total) by mouth every 12 (twelve) hours., Disp: 180 tablet, Rfl: 3    famotidine (PEPCID) 20 MG tablet, Take 1 tablet (20 mg total) by mouth 2 (two) times daily., Disp: 180 tablet, Rfl: 3    fexofenadine HCl (ALLEGRA ORAL), Take 1 tablet by mouth daily as needed (allergies)., Disp: , Rfl:     fluticasone furoate-vilanteroL (BREO) 100-25 mcg/dose diskus inhaler, Inhale 1 puff into the lungs once daily. Controller. Use this inhaler every day., Disp: , Rfl:     fluticasone propionate (FLONASE) 50 mcg/actuation nasal spray, 1 spray (50 mcg total) by Each Nostril route daily as needed for Allergies., Disp: , Rfl:     furosemide (LASIX) 20 MG tablet, Take 4 tablets (80 mg total) by mouth 2 (two) times daily as needed. For worsening LE swelling or shortness of breath on non-dialysis days, Disp: 60 tablet, Rfl: 11    gabapentin (NEURONTIN) 100 MG capsule, Take 1 capsule (100 mg total) by mouth as needed (pain)., Disp: , Rfl:     hydrALAZINE (APRESOLINE) 50 MG tablet, Take 1 tablet (50 mg total) by mouth 3 (three) times daily. Hold until follow up with PCP or cardiologist, Disp: 270 tablet, Rfl: 3    metoprolol succinate (TOPROL-XL) 25 MG 24 hr tablet, Take 0.5 tablets (12.5 mg total) by mouth once daily., Disp: 45 tablet, Rfl: 3    multivitamin (THERAGRAN) per tablet, Take 1 tablet by mouth Daily., Disp: , Rfl:     NIFEdipine (PROCARDIA-XL) 60 MG (OSM) 24 hr tablet, Take 1 tablet (60 mg total) by mouth 2  (two) times a day. Hold until follow up with PCP or cardiologist, Disp: 180 tablet, Rfl: 3    nitroGLYCERIN (NITROSTAT) 0.4 MG SL tablet, Place 1 tablet (0.4 mg total) under the tongue every 5 (five) minutes as needed for Chest pain., Disp: 25 tablet, Rfl: 0    omeprazole (PRILOSEC) 20 MG capsule, Take 1 capsule (20 mg total) by mouth daily as needed (heartburn)., Disp: , Rfl:     paricalcitoL (ZEMPLAR) 1 MCG capsule, TAKE 1 CAPSULE ON MONDAY, WEDNESDAY AND FRIDAY, Disp: 36 capsule, Rfl: 3    predniSONE (DELTASONE) 5 MG tablet, Take 1 tablet (5 mg total) by mouth once daily., Disp: 90 tablet, Rfl: 3    pulse oximeter (PULSE OXIMETER) device, by Apply Externally route 2 (two) times a day. Use twice daily at 8 AM and 3 PM and record the value in MyChart as directed., Disp: 1 each, Rfl: 0    sevelamer carbonate (RENVELA) 800 mg Tab, Take 1 tablet (800 mg total) by mouth 3 (three) times daily with meals., Disp: 90 tablet, Rfl: 11    suvorexant (BELSOMRA) 5 mg Tab, Take 5 mg by mouth nightly as needed (insomnia)., Disp: 30 tablet, Rfl: 2    tacrolimus (PROGRAF) 1 MG Cap, Take 3 capsules (3 mg total) by mouth every 12 (twelve) hours for 60 days, THEN 2 capsules (2 mg total) every 12 (twelve) hours for 60 days, THEN 1 capsule (1 mg total) every 12 (twelve) hours for 60 days, THEN 1 capsule (1 mg total) once daily., Disp: 540 capsule, Rfl: 4    tiotropium bromide (SPIRIVA RESPIMAT) 1.25 mcg/actuation inhaler, Inhale 2 puffs into the lungs once daily. Controller. Use this inhaler every day., Disp: 4 g, Rfl: 11    Review of patient's allergies indicates:   Allergen Reactions    Ace inhibitors Swelling    Verapamil Other (See Comments)     Other reaction(s): Unknown    Povidone-iodine Itching       Family History   Problem Relation Name Age of Onset    No Known Problems Sister      No Known Problems Brother      Thyroid disease Mother          s/p surgery    Heart disease Father          had pacemaker    No Known Problems  Sister      Kidney failure Sister      Kidney disease Sister      No Known Problems Sister      Kidney disease Brother      Kidney failure Brother          s/p transplant    Diabetes Mellitus Neg Hx      Stroke Neg Hx      Heart attack Neg Hx      Cancer Neg Hx      Celiac disease Neg Hx      Cirrhosis Neg Hx      Colon cancer Neg Hx      Esophageal cancer Neg Hx      Inflammatory bowel disease Neg Hx      Rectal cancer Neg Hx      Stomach cancer Neg Hx      Ulcerative colitis Neg Hx      Liver disease Neg Hx      Liver cancer Neg Hx      Crohn's disease Neg Hx      Melanoma Neg Hx         Social History     Tobacco Use    Smoking status: Every Day     Current packs/day: 0.00     Average packs/day: 0.5 packs/day for 40.0 years (20.0 ttl pk-yrs)     Types: Cigarettes     Start date: 1982     Last attempt to quit: 2022     Years since quittin.2    Smokeless tobacco: Never    Tobacco comments:     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     Patient is currently retired   Substance Use Topics    Alcohol use: No    Drug use: No       PHYSICAL EXAM:   BP (!) 145/67 (BP Location: Right arm, Patient Position: Sitting, BP Method: Medium (Automatic))   Pulse (!) 58   Temp 97.9 °F (36.6 °C) (Oral)   Ht 6' (1.829 m)   Wt 79 kg (174 lb 2.6 oz)   BMI 23.62 kg/m²   Constitutional:  Alert,   Well-appearing  In no distress.   Neurological: Normal speech  no focal findings  CN II - XII grossly intact.    Psychiatric: Mood and affect appropriate and symmetric.   HEENT: Normocephalic / atraumatic  PERRLA  Midline trachea  No scars across the neck   Cardiac: Regular rate and rhythm.   Pulmonary: Normal pulmonary effort.   Abdomen: Soft, not distended.     Skin: Warm and well perfused.    Vascular:  Right radial pulse 1 to 2+, left brachial pulse 3 +   Extremities/  Musculoskeletal: Left arm shows a good thrill with minimal pulsatility of the fistula.  Hematoma present with some  pulsatility in the midportion of AV fistula     IMAGIN2024: Duplex of the fistula shows excellent flow volume at 2.5 L, however presence of a 3 x 4 cm pseudoaneurysm at the midportion of the AV fistula partially thrombosed with some flow present.    2024:  Duplex of the fistula shows several areas of stenosis of the worse peak velocity of 678.  Flow volume 712 mils per minute  Diameter 9.7 proximal, 8.7 mid, 10.0 distal  Diameter 1.2 proximal 2.0 mid, 1.4 distal       IMPRESSION:  Well matured now LEFT high flow AV fistula, overall working well.  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  Intervention is warranted    PLAN:  Left fistulogram and probable covered stent placement, 2024  Hybrid OR 11 case    I have explained the risks, benefits and alternatives for this procedure in detail.  The patient voices understanding and all questions have be answered, and agrees to proceed with the procedure.     INDIGO Melendez III, MD, FACS  Professor and Chief, Vascular and Endovascular Surgery

## 2024-05-24 NOTE — H&P (VIEW-ONLY)
VASCULAR SURGERY SERVICE    REFERRING DOCTOR: Emre Latif MD    CHIEF COMPLAINT: CKD 5    HISTORY OF PRESENT ILLNESS: Ibrahima Phelps is a 68 y.o. male status post 2 kidney transplantations the last in 2005, now with the client renal function with a GFR of 9.7 not yet initiated hemodialysis.  He is right-handed.  He would a Veronica AV fistula in the left many many years ago.    S/p    PTA, left AV fistula 02/19/2024  left brachiocephalic AV fistula creation 12/18/2023    He is taking Eliquis for atrial fibrillation.    He is no history of AICD or pacemaker placement    02/06/2024:  This is initial follow-up visit status post left brachiocephalic AV fistula creation 12/18/2023.  He has not initiated hemodialysis.  Denies any hand pain weakness or numbness.  This fistula was felt likely that he had a outflow vein stenosis in the cephalic vein    03/22/2024:  This is postoperative appointment after angioplasty of his left AV fistula 1 month ago.  He subsequently initiated hemodialysis proximally 3 weeks ago.  He would 1 episode of infiltration last week but none since    05/24/2024:  Now presents for follow-up.  Since then notes that his hemodialysis has been working through his fistula without any sort of issues.  No more issues of infiltration, heavy bleeding.  Notes that the hematoma that was present prior in March as still been there, largely unchanged.      Past Medical History:   Diagnosis Date    Atrial fibrillation     CAD (coronary artery disease) 2006    MI in 2006    CHF (congestive heart failure) 2017    CKD (chronic kidney disease) stage 3, GFR 30-59 ml/min     Dr. Latif.  in transplanted kidney    CKD (chronic kidney disease) stage 5, GFR less than 15 ml/min 02/02/2024    COVID-19 02/07/2023    Diverticulosis     Hyperlipidemia     Hypertension     Keloid cicatrix     Metabolic bone disease     Other emphysema 10/01/2019    Pericarditis     S/P kidney transplant 1992    x2 (1992 & 2005),     "Thrombocytopenia     Thyroid disease     Tobacco use 09/05/2014       Past Surgical History:   Procedure Laterality Date    AV FISTULA PLACEMENT Left 12/18/2023    Procedure: CREATION, AV FISTULA;  Surgeon: JUANI Melendez III, MD;  Location: [facility] OR [facility] FLR;  Service: Vascular;  Laterality: Left;  LUE AVF creation vs AVG insertion    CARDIOVERSION  04/30/15    CHOLECYSTECTOMY      COLONOSCOPY  April 20, 2011    Diverticulosis, repeat recommended in 3 yrs., repeat colonoscopy 2014 revealed 2 polyps.  He should return in 5 years.    COLONOSCOPY N/A 3/13/2020    Procedure: COLONOSCOPY;  Surgeon: Chon Casper MD;  Location: [facility] ENDO (4TH FLR);  Service: Endoscopy;  Laterality: N/A;  ok to hold coumadin x5days-see telephone encounter 2/4/20-tb    COLONOSCOPY N/A 2/4/2021    Procedure: COLONOSCOPY;  Surgeon: Chon Casper MD;  Location: [facility] ENDO (4TH FLR);  Service: Endoscopy;  Laterality: N/A;  Eliquis - per Dr. GAVIN Blunt ok to hold x 2 days and "restarted asap"- ERW  last prep "poor", [id number] ordered/ Pt requests Dr. Casper  prep ins. mailed - COVID screening on 2/1/21 PCW- ERW    COLONOSCOPY N/A 3/3/2021    Procedure: COLONOSCOPY;  Surgeon: Chon Casper MD;  Location: [facility] ENDO (4TH FLR);  Service: Endoscopy;  Laterality: N/A;  Patient with his second poor bowel pre and has poor prep today.  If patient not intersted or can't get colonoscopy tomorrow will need constipation bowel prep and will need to restart his Eliquis today.Thanks,Chon Blunt-ok to hold Eliquis 2 days prior      COVID     CORONARY ANGIOGRAPHY N/A 2/28/2024    Procedure: ANGIOGRAM, CORONARY ARTERY;  Surgeon: Tylor Lincoln MD;  Location: [facility] CATH LAB;  Service: Cardiology;  Laterality: N/A;    CORONARY ANGIOPLASTY WITH STENT PLACEMENT  9/13/2006    FISTULOGRAM N/A 2/19/2024    Procedure: Fistulogram;  Surgeon: JUANI Melendez III, MD;  Location: [facility] CATH LAB;  Service: Peripheral Vascular;  Laterality: N/A;    " IRRIGATION AND DEBRIDEMENT Right 8/30/2023    Procedure: IRRIGATION AND DEBRIDEMENT, RIGHT MIDDLE FINGER;  Surgeon: Martin Larsen MD;  [facility];  Service: Orthopedics;  Laterality: Right;    KIDNEY TRANSPLANT  2005    LEFT HEART CATHETERIZATION N/A 2/26/2024    Procedure: Left heart cath;  Surgeon: Tylor Lincoln MD;  Location: [facility] CATH LAB;  Service: Cardiology;  Laterality: N/A;    PARATHYROIDECTOMY      PERCUTANEOUS TRANSLUMINAL ANGIOPLASTY OF ARTERIOVENOUS FISTULA Left 2/19/2024    Procedure: PTA, AV FISTULA;  Surgeon: JUANI Melendez III, MD;  Location: [facility] CATH LAB;  Service: Peripheral Vascular;  Laterality: Left;    STENT, DRUG ELUTING, SINGLE VESSEL, CORONARY N/A 2/28/2024    Procedure: Stent, Drug Eluting, Single Vessel, Coronary;  Surgeon: Tylor Lincoln MD;  Location: [facility] CATH LAB;  Service: Cardiology;  Laterality: N/A;    TREATMENT OF CARDIAC ARRHYTHMIA N/A 3/28/2022    Procedure: CARDIOVERSION;  Surgeon: Migue Blunt MD;  Location: [facility] EP LAB;  Service: Cardiology;  Laterality: N/A;  AF, DCC, MAC, GP, 3 PREP*RODRIGO deferred pt 100% compliant*         Current Outpatient Medications:     acetaminophen (TYLENOL) 500 MG tablet, Take 1 tablet (500 mg total) by mouth every 6 (six) hours as needed for Pain., Disp: 20 tablet, Rfl: 0    albuterol (VENTOLIN HFA) 90 mcg/actuation inhaler, Inhale 2 puffs into the lungs every 6 (six) hours as needed for Wheezing or Shortness of Breath. Rescue, Disp: 18 g, Rfl: 3    albuterol-ipratropium (DUO-NEB) 2.5 mg-0.5 mg/3 mL nebulizer solution, Take 3 mLs by nebulization every 4 (four) hours as needed for Wheezing. Rescue, Disp: 75 mL, Rfl: 0    amiodarone (PACERONE) 200 MG Tab, Take 1 tablet (200 mg total) by mouth once daily., Disp: 30 tablet, Rfl: 11    apixaban (ELIQUIS) 5 mg Tab, Take 1 tablet (5 mg total) by mouth 2 (two) times daily., Disp: 180 tablet, Rfl: 3    atorvastatin (LIPITOR) 80 MG tablet, Take 1 tablet (80 mg total)  by mouth once daily., Disp: 90 tablet, Rfl: 3    b complex vitamins (B COMPLEX-VITAMIN B12) tablet, Take 1 tablet by mouth once daily., Disp: 90 tablet, Rfl: 3    calcium carbonate (CALCIUM 600 ORAL), Take 1 tablet by mouth 2 (two) times a day., Disp: , Rfl:     CHOLECALCIFEROL, VITAMIN D3, ORAL, Take 2 tablets by mouth 2 (two) times daily., Disp: , Rfl:     clopidogreL (PLAVIX) 75 mg tablet, Take 1 tablet (75 mg total) by mouth once daily., Disp: 90 tablet, Rfl: 3    doxazosin (CARDURA) 4 MG tablet, Take 1 tablet (4 mg total) by mouth every 12 (twelve) hours., Disp: 180 tablet, Rfl: 3    famotidine (PEPCID) 20 MG tablet, Take 1 tablet (20 mg total) by mouth 2 (two) times daily., Disp: 180 tablet, Rfl: 3    fexofenadine HCl (ALLEGRA ORAL), Take 1 tablet by mouth daily as needed (allergies)., Disp: , Rfl:     fluticasone furoate-vilanteroL (BREO) 100-25 mcg/dose diskus inhaler, Inhale 1 puff into the lungs once daily. Controller. Use this inhaler every day., Disp: , Rfl:     fluticasone propionate (FLONASE) 50 mcg/actuation nasal spray, 1 spray (50 mcg total) by Each Nostril route daily as needed for Allergies., Disp: , Rfl:     furosemide (LASIX) 20 MG tablet, Take 4 tablets (80 mg total) by mouth 2 (two) times daily as needed. For worsening LE swelling or shortness of breath on non-dialysis days, Disp: 60 tablet, Rfl: 11    gabapentin (NEURONTIN) 100 MG capsule, Take 1 capsule (100 mg total) by mouth as needed (pain)., Disp: , Rfl:     hydrALAZINE (APRESOLINE) 50 MG tablet, Take 1 tablet (50 mg total) by mouth 3 (three) times daily. Hold until follow up with PCP or cardiologist, Disp: 270 tablet, Rfl: 3    metoprolol succinate (TOPROL-XL) 25 MG 24 hr tablet, Take 0.5 tablets (12.5 mg total) by mouth once daily., Disp: 45 tablet, Rfl: 3    multivitamin (THERAGRAN) per tablet, Take 1 tablet by mouth Daily., Disp: , Rfl:     NIFEdipine (PROCARDIA-XL) 60 MG (OSM) 24 hr tablet, Take 1 tablet (60 mg total) by mouth 2  (two) times a day. Hold until follow up with PCP or cardiologist, Disp: 180 tablet, Rfl: 3    nitroGLYCERIN (NITROSTAT) 0.4 MG SL tablet, Place 1 tablet (0.4 mg total) under the tongue every 5 (five) minutes as needed for Chest pain., Disp: 25 tablet, Rfl: 0    omeprazole (PRILOSEC) 20 MG capsule, Take 1 capsule (20 mg total) by mouth daily as needed (heartburn)., Disp: , Rfl:     paricalcitoL (ZEMPLAR) 1 MCG capsule, TAKE 1 CAPSULE ON MONDAY, WEDNESDAY AND FRIDAY, Disp: 36 capsule, Rfl: 3    predniSONE (DELTASONE) 5 MG tablet, Take 1 tablet (5 mg total) by mouth once daily., Disp: 90 tablet, Rfl: 3    pulse oximeter (PULSE OXIMETER) device, by Apply Externally route 2 (two) times a day. Use twice daily at 8 AM and 3 PM and record the value in MyChart as directed., Disp: 1 each, Rfl: 0    sevelamer carbonate (RENVELA) 800 mg Tab, Take 1 tablet (800 mg total) by mouth 3 (three) times daily with meals., Disp: 90 tablet, Rfl: 11    suvorexant (BELSOMRA) 5 mg Tab, Take 5 mg by mouth nightly as needed (insomnia)., Disp: 30 tablet, Rfl: 2    tacrolimus (PROGRAF) 1 MG Cap, Take 3 capsules (3 mg total) by mouth every 12 (twelve) hours for 60 days, THEN 2 capsules (2 mg total) every 12 (twelve) hours for 60 days, THEN 1 capsule (1 mg total) every 12 (twelve) hours for 60 days, THEN 1 capsule (1 mg total) once daily., Disp: 540 capsule, Rfl: 4    tiotropium bromide (SPIRIVA RESPIMAT) 1.25 mcg/actuation inhaler, Inhale 2 puffs into the lungs once daily. Controller. Use this inhaler every day., Disp: 4 g, Rfl: 11    Review of patient's allergies indicates:   Allergen Reactions    Ace inhibitors Swelling    Verapamil Other (See Comments)     Other reaction(s): Unknown    Povidone-iodine Itching       Family History   Problem Relation Name Age of Onset    No Known Problems Sister      No Known Problems Brother      Thyroid disease Mother          s/p surgery    Heart disease Father          had pacemaker    No Known Problems  Sister      Kidney failure Sister      Kidney disease Sister      No Known Problems Sister      Kidney disease Brother      Kidney failure Brother          s/p transplant    Diabetes Mellitus Neg Hx      Stroke Neg Hx      Heart attack Neg Hx      Cancer Neg Hx      Celiac disease Neg Hx      Cirrhosis Neg Hx      Colon cancer Neg Hx      Esophageal cancer Neg Hx      Inflammatory bowel disease Neg Hx      Rectal cancer Neg Hx      Stomach cancer Neg Hx      Ulcerative colitis Neg Hx      Liver disease Neg Hx      Liver cancer Neg Hx      Crohn's disease Neg Hx      Melanoma Neg Hx         Social History     Tobacco Use    Smoking status: Every Day     Current packs/day: 0.00     Average packs/day: 0.5 packs/day for 40.0 years (20.0 ttl pk-yrs)     Types: Cigarettes     Start date: 1982     Last attempt to quit: 2022     Years since quittin.2    Smokeless tobacco: Never    Tobacco comments:     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     Patient is currently retired   Substance Use Topics    Alcohol use: No    Drug use: No       PHYSICAL EXAM:   BP (!) 145/67 (BP Location: Right arm, Patient Position: Sitting, BP Method: Medium (Automatic))   Pulse (!) 58   Temp 97.9 °F (36.6 °C) (Oral)   Ht 6' (1.829 m)   Wt 79 kg (174 lb 2.6 oz)   BMI 23.62 kg/m²   Constitutional:  Alert,   Well-appearing  In no distress.   Neurological: Normal speech  no focal findings  CN II - XII grossly intact.    Psychiatric: Mood and affect appropriate and symmetric.   HEENT: Normocephalic / atraumatic  PERRLA  Midline trachea  No scars across the neck   Cardiac: Regular rate and rhythm.   Pulmonary: Normal pulmonary effort.   Abdomen: Soft, not distended.     Skin: Warm and well perfused.    Vascular:  Right radial pulse 1 to 2+, left brachial pulse 3 +   Extremities/  Musculoskeletal: Left arm shows a good thrill with minimal pulsatility of the fistula.  Hematoma present with some  pulsatility in the midportion of AV fistula     IMAGIN2024: Duplex of the fistula shows excellent flow volume at 2.5 L, however presence of a 3 x 4 cm pseudoaneurysm at the midportion of the AV fistula partially thrombosed with some flow present.    2024:  Duplex of the fistula shows several areas of stenosis of the worse peak velocity of 678.  Flow volume 712 mils per minute  Diameter 9.7 proximal, 8.7 mid, 10.0 distal  Diameter 1.2 proximal 2.0 mid, 1.4 distal       IMPRESSION:  Well matured now LEFT high flow AV fistula, overall working well.  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  Intervention is warranted    PLAN:  Left fistulogram and probable covered stent placement, 2024  Hybrid OR 11 case    I have explained the risks, benefits and alternatives for this procedure in detail.  The patient voices understanding and all questions have be answered, and agrees to proceed with the procedure.     INDIGO Melendez III, MD, FACS  Professor and Chief, Vascular and Endovascular Surgery

## 2024-05-27 PROBLEM — I21.4 NSTEMI (NON-ST ELEVATED MYOCARDIAL INFARCTION): Status: RESOLVED | Noted: 2023-11-24 | Resolved: 2024-05-27

## 2024-05-29 ENCOUNTER — OFFICE VISIT (OUTPATIENT)
Dept: ORTHOPEDICS | Facility: CLINIC | Age: 69
End: 2024-05-29
Payer: MEDICARE

## 2024-05-29 VITALS — WEIGHT: 176.38 LBS | BODY MASS INDEX: 23.92 KG/M2

## 2024-05-29 DIAGNOSIS — M25.521 RIGHT ELBOW PAIN: Primary | ICD-10-CM

## 2024-05-29 DIAGNOSIS — M25.521 PAIN OF BOTH ELBOWS: ICD-10-CM

## 2024-05-29 DIAGNOSIS — M19.021 OSTEOARTHRITIS OF RIGHT ELBOW, UNSPECIFIED OSTEOARTHRITIS TYPE: ICD-10-CM

## 2024-05-29 DIAGNOSIS — M25.522 PAIN OF BOTH ELBOWS: ICD-10-CM

## 2024-05-29 PROCEDURE — 20606 DRAIN/INJ JOINT/BURSA W/US: CPT | Mod: HCNC,RT,S$GLB, | Performed by: STUDENT IN AN ORGANIZED HEALTH CARE EDUCATION/TRAINING PROGRAM

## 2024-05-29 PROCEDURE — 99499 UNLISTED E&M SERVICE: CPT | Mod: HCNC,S$GLB,, | Performed by: STUDENT IN AN ORGANIZED HEALTH CARE EDUCATION/TRAINING PROGRAM

## 2024-05-29 PROCEDURE — 1125F AMNT PAIN NOTED PAIN PRSNT: CPT | Mod: HCNC,CPTII,S$GLB, | Performed by: STUDENT IN AN ORGANIZED HEALTH CARE EDUCATION/TRAINING PROGRAM

## 2024-05-29 PROCEDURE — 99999 PR PBB SHADOW E&M-EST. PATIENT-LVL IV: CPT | Mod: PBBFAC,HCNC,, | Performed by: STUDENT IN AN ORGANIZED HEALTH CARE EDUCATION/TRAINING PROGRAM

## 2024-05-29 RX ORDER — TRIAMCINOLONE ACETONIDE 40 MG/ML
40 INJECTION, SUSPENSION INTRA-ARTICULAR; INTRAMUSCULAR
Status: DISCONTINUED | OUTPATIENT
Start: 2024-05-29 | End: 2024-05-29 | Stop reason: HOSPADM

## 2024-05-29 RX ADMIN — TRIAMCINOLONE ACETONIDE 40 MG: 40 INJECTION, SUSPENSION INTRA-ARTICULAR; INTRAMUSCULAR at 10:05

## 2024-05-29 NOTE — PROCEDURES
Intermediate Joint Aspiration/Injection: R elbow    Date/Time: 5/29/2024 10:45 AM    Performed by: Yamel Ahmadi MD  Authorized by: Yamel Ahmadi MD    Consent Done?:  Yes (Verbal)  Indications:  Pain and diagnostic evaluation  Site marked: The procedure site was marked    Timeout: Prior to procedure the correct patient, procedure, and site was verified      Location:  Elbow  Site:  R elbow  Prep: Patient was prepped and draped in usual sterile fashion    Ultrasonic Guidance for needle placement: Yes  Images are saved and documented.    Needle gauge: 21G.  Approach: Dorsolateral.  Medications:  40 mg triamcinolone acetonide 40 mg/mL (Ropivacaine 0.2% 2 mL)     Additional Comments: TECHNIQUE: Real time ultrasound examination of the right elbow joint was performed with [employer] Edge 2, 9-L MHz linear probe(s). Ultrasound guidance was used for needle localization. Images were saved and stored for documentation. Dynamic visualization of the needle was continuous throughout the procedures and maintained in good position.

## 2024-05-29 NOTE — PROGRESS NOTES
CC: right elbow pain    68 y.o. Male presents today for CSI injection of his right elbow joint. Pt was referred by Sb Roca PA-C.     Attempted treatments: gabapentin, tylenol arthritis PRN  Last Injection: prev aspiration  Pain score: 5/10  History of trauma/injury: none since last visit with Sb Roca PA-C  Affecting ADLs: no (pain at night)     REVIEW OF SYSTEMS:   Constitution: Patient denies fever or chills.  Eyes: Patient denies eye pain or vision changes.  HEENT: Patient denies ear pain, sore throat, or nasal discharge.  CVS: Patient denies chest pain.  Lungs: Patient denies shortness of breath or cough.  Skin: Patient denies skin rash or itching.    Musculoskeletal: Patient denies recent falls. See HPI.  Psych: Patient denies any current anxiety or nervousness.    PAST MEDICAL HISTORY:   Past Medical History:   Diagnosis Date    Atrial fibrillation     CAD (coronary artery disease) 2006    MI in 2006    CHF (congestive heart failure) 2017    CKD (chronic kidney disease) stage 3, GFR 30-59 ml/min     Dr. Latif.  in transplanted kidney    CKD (chronic kidney disease) stage 5, GFR less than 15 ml/min 02/02/2024    COVID-19 02/07/2023    Diverticulosis     Hyperlipidemia     Hypertension     Keloid cicatrix     Metabolic bone disease     Other emphysema 10/01/2019    Pericarditis     S/P kidney transplant 1992    x2 (1992 & 2005),    Thrombocytopenia     Thyroid disease     Tobacco use 09/05/2014       MEDICATIONS:     Current Outpatient Medications:     acetaminophen (TYLENOL) 500 MG tablet, Take 1 tablet (500 mg total) by mouth every 6 (six) hours as needed for Pain., Disp: 20 tablet, Rfl: 0    albuterol (VENTOLIN HFA) 90 mcg/actuation inhaler, Inhale 2 puffs into the lungs every 6 (six) hours as needed for Wheezing or Shortness of Breath. Rescue, Disp: 18 g, Rfl: 3    albuterol-ipratropium (DUO-NEB) 2.5 mg-0.5 mg/3 mL nebulizer solution, Take 3 mLs by nebulization every 4 (four) hours as needed  for Wheezing. Rescue, Disp: 75 mL, Rfl: 0    amiodarone (PACERONE) 200 MG Tab, Take 1 tablet (200 mg total) by mouth once daily., Disp: 30 tablet, Rfl: 11    apixaban (ELIQUIS) 5 mg Tab, Take 1 tablet (5 mg total) by mouth 2 (two) times daily., Disp: 180 tablet, Rfl: 3    atorvastatin (LIPITOR) 80 MG tablet, Take 1 tablet (80 mg total) by mouth once daily., Disp: 90 tablet, Rfl: 3    b complex vitamins (B COMPLEX-VITAMIN B12) tablet, Take 1 tablet by mouth once daily., Disp: 90 tablet, Rfl: 3    calcium carbonate (CALCIUM 600 ORAL), Take 1 tablet by mouth 2 (two) times a day., Disp: , Rfl:     CHOLECALCIFEROL, VITAMIN D3, ORAL, Take 2 tablets by mouth 2 (two) times daily., Disp: , Rfl:     clopidogreL (PLAVIX) 75 mg tablet, Take 1 tablet (75 mg total) by mouth once daily., Disp: 90 tablet, Rfl: 3    doxazosin (CARDURA) 4 MG tablet, Take 1 tablet (4 mg total) by mouth every 12 (twelve) hours., Disp: 180 tablet, Rfl: 3    famotidine (PEPCID) 20 MG tablet, Take 1 tablet (20 mg total) by mouth 2 (two) times daily., Disp: 180 tablet, Rfl: 3    fexofenadine HCl (ALLEGRA ORAL), Take 1 tablet by mouth daily as needed (allergies)., Disp: , Rfl:     fluticasone furoate-vilanteroL (BREO) 100-25 mcg/dose diskus inhaler, Inhale 1 puff into the lungs once daily. Controller. Use this inhaler every day., Disp: , Rfl:     fluticasone propionate (FLONASE) 50 mcg/actuation nasal spray, 1 spray (50 mcg total) by Each Nostril route daily as needed for Allergies., Disp: , Rfl:     furosemide (LASIX) 20 MG tablet, Take 4 tablets (80 mg total) by mouth 2 (two) times daily as needed. For worsening LE swelling or shortness of breath on non-dialysis days, Disp: 60 tablet, Rfl: 11    gabapentin (NEURONTIN) 100 MG capsule, Take 1 capsule (100 mg total) by mouth as needed (pain)., Disp: , Rfl:     metoprolol succinate (TOPROL-XL) 25 MG 24 hr tablet, Take 0.5 tablets (12.5 mg total) by mouth once daily., Disp: 45 tablet, Rfl: 3    multivitamin  (THERAGRAN) per tablet, Take 1 tablet by mouth Daily., Disp: , Rfl:     NIFEdipine (PROCARDIA-XL) 60 MG (OSM) 24 hr tablet, Take 1 tablet (60 mg total) by mouth 2 (two) times a day. Hold until follow up with PCP or cardiologist, Disp: 180 tablet, Rfl: 3    nitroGLYCERIN (NITROSTAT) 0.4 MG SL tablet, Place 1 tablet (0.4 mg total) under the tongue every 5 (five) minutes as needed for Chest pain., Disp: 25 tablet, Rfl: 0    omeprazole (PRILOSEC) 20 MG capsule, Take 1 capsule (20 mg total) by mouth daily as needed (heartburn)., Disp: , Rfl:     predniSONE (DELTASONE) 5 MG tablet, Take 1 tablet (5 mg total) by mouth once daily., Disp: 90 tablet, Rfl: 3    pulse oximeter (PULSE OXIMETER) device, by Apply Externally route 2 (two) times a day. Use twice daily at 8 AM and 3 PM and [facility]., Disp: 1 each, Rfl: 0    tacrolimus (PROGRAF) 1 MG Cap, Take 3 capsules (3 mg total) by mouth every 12 (twelve) hours for 60 days, THEN 2 capsules (2 mg total) every 12 (twelve) hours for 60 days, THEN 1 capsule (1 mg total) every 12 (twelve) hours for 60 days, THEN 1 capsule (1 mg total) once daily., Disp: 540 capsule, Rfl: 4    tiotropium bromide (SPIRIVA RESPIMAT) 1.25 mcg/actuation inhaler, Inhale 2 puffs into the lungs once daily. Controller. Use this inhaler every day., Disp: 4 g, Rfl: 11    hydrALAZINE (APRESOLINE) 50 MG tablet, Take 1 tablet (50 mg total) by mouth 3 (three) times daily. Hold until follow up with PCP or cardiologist (Patient not taking: Reported on 5/29/2024), Disp: 270 tablet, Rfl: 3    paricalcitoL (ZEMPLAR) 1 MCG capsule, TAKE 1 CAPSULE ON MONDAY, WEDNESDAY AND FRIDAY (Patient not taking: Reported on 5/29/2024), Disp: 36 capsule, Rfl: 3    sevelamer carbonate (RENVELA) 800 mg Tab, Take 1 tablet (800 mg total) by mouth 3 (three) times daily with meals. (Patient not taking: Reported on 5/29/2024), Disp: 90 tablet, Rfl: 11    suvorexant (BELSOMRA) 5 mg Tab, Take 5 mg by mouth nightly  as needed (insomnia). (Patient not taking: Reported on 5/29/2024), Disp: 30 tablet, Rfl: 2    ALLERGIES:   Review of patient's allergies indicates:   Allergen Reactions    Ace inhibitors Swelling    Verapamil Other (See Comments)     Other reaction(s): Unknown    Povidone-iodine Itching        PHYSICAL EXAMINATION:  BP (!) (P) 168/68   Wt 80 kg (176 lb 5.9 oz)   BMI 23.92 kg/m²   There are no signs of infection at the injection site, including no rubor, calor, or skin lesions.  Gen: NAD.  Psych: Affect & judgment nl.  Neuro: Grossly CNI. MORA.  HEENT: -Trach dev. -Eye d/c.   CV: Color nl. -E/C/C. WWPx4.  Pulm: -Dyspnea. -Cough.  Lymph: -Edema.  Int: -Rash/lesion noted. Skin is warm and dry    ASSESSMENT:      ICD-10-CM ICD-9-CM   1. Right elbow pain  M25.521 719.42   2. Osteoarthritis of right elbow, unspecified osteoarthritis type  M19.021 715.92         PLAN:  Ultrasound-guided injection of the right elbow joint with triamcinolone performed at visit today.    Future planning includes - Continue exercise program    Risks and benefits were discussed with patient prior to receiving injection.  Depending on injection type, risks include the possibility of infection, pain, disruptions in blood pressure and blood sugar, and cosmetic deformity at site of injection.    All questions were answered to the best of my ability and all concerns were addressed at this time.    Follow up in 3 months, or sooner if needed.      This note is dictated using the M*Modal Fluency Direct word recognition program. There are word recognition mistakes that are occasionally missed on review.

## 2024-05-31 ENCOUNTER — TELEPHONE (OUTPATIENT)
Dept: VASCULAR SURGERY | Facility: CLINIC | Age: 69
End: 2024-05-31
Payer: MEDICARE

## 2024-05-31 NOTE — PRE-PROCEDURE INSTRUCTIONS
PREOP INSTRUCTIONS TO PATIENT'S WIFE:  No food,milk or milk products for 8 hours before surgery.  Clear liquids like water,gatorade,apple juice are allowed up until 2 hours before surgery.  Instructed to follow the surgeon's instructions if they differ from these.  Shower instructions as well as directions to the Surgery Center were given.  Encouraged to wear loose fitting,comfortable clothing.  Medication instructions for pm prior to and am of procedure reviewed.  Instructed to avoid taking vitamins,supplements and ibuprofen the morning of surgery.    Patient's wife denies patient having any side effects or issues with anesthesia or sedation.     Patient's wife does not know arrival time.Explained that this information comes from the surgeon's office and if they haven't heard from them by 2 or 3 pm to call the office.Patient's wife stated an understanding.

## 2024-06-01 ENCOUNTER — ANESTHESIA EVENT (OUTPATIENT)
Dept: SURGERY | Facility: HOSPITAL | Age: 69
End: 2024-06-01
Payer: MEDICARE

## 2024-06-02 NOTE — ANESTHESIA PREPROCEDURE EVALUATION
[facility]  Anesthesia Pre-Operative Evaluation   06/01/2024        Patient Name: Ibrahima Phelps  YOB: 1955  MRN: [id number]    Subjective  Pre-operative evaluation for Procedure(s) (LRB):  FISTULOGRAM, WITH PTA (Left)    Ibrahima Phelps is a 68 y.o. male w/ a significant PMHx of:    Past Medical History:   Diagnosis Date    Atrial fibrillation     CAD (coronary artery disease) 2006    MI in 2006    CHF (congestive heart failure) 2017    CKD (chronic kidney disease) stage 3, GFR 30-59 ml/min     Dr. Latif.  in transplanted kidney    CKD (chronic kidney disease) stage 5, GFR less than 15 ml/min 02/02/2024    COVID-19 02/07/2023    Diverticulosis     Hyperlipidemia     Hypertension     Keloid cicatrix     Metabolic bone disease     Other emphysema 10/01/2019    Pericarditis     S/P kidney transplant 1992    x2 (1992 & 2005),    Thrombocytopenia     Thyroid disease     Tobacco use 09/05/2014     S/p   -- PTA, left AV fistula 02/19/2024  -- Left brachiocephalic AV fistula creation 12/18/2023     Well matured LEFT high flow AV fistula, overall working well.  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  Intervention is warranted    Current Inpatient Medications:       No current facility-administered medications on file prior to encounter.     Current Outpatient Medications on File Prior to Encounter   Medication Sig Dispense Refill    acetaminophen (TYLENOL) 500 MG tablet Take 1 tablet (500 mg total) by mouth every 6 (six) hours as needed for Pain. 20 tablet 0    albuterol (VENTOLIN HFA) 90 mcg/actuation inhaler Inhale 2 puffs into the lungs every 6 (six) hours as needed for Wheezing or Shortness of Breath. Rescue 18 g 3    albuterol-ipratropium (DUO-NEB) 2.5 mg-0.5 mg/3 mL nebulizer solution Take 3 mLs by nebulization every 4 (four) hours as needed for Wheezing. Rescue 75 mL 0    amiodarone  (PACERONE) 200 MG Tab Take 1 tablet (200 mg total) by mouth once daily. 30 tablet 11    apixaban (ELIQUIS) 5 mg Tab Take 1 tablet (5 mg total) by mouth 2 (two) times daily. 180 tablet 3    atorvastatin (LIPITOR) 80 MG tablet Take 1 tablet (80 mg total) by mouth once daily. 90 tablet 3    b complex vitamins (B COMPLEX-VITAMIN B12) tablet Take 1 tablet by mouth once daily. 90 tablet 3    calcium carbonate (CALCIUM 600 ORAL) Take 1 tablet by mouth 2 (two) times a day.      CHOLECALCIFEROL, VITAMIN D3, ORAL Take 2 tablets by mouth 2 (two) times daily.      clopidogreL (PLAVIX) 75 mg tablet Take 1 tablet (75 mg total) by mouth once daily. 90 tablet 3    doxazosin (CARDURA) 4 MG tablet Take 1 tablet (4 mg total) by mouth every 12 (twelve) hours. 180 tablet 3    famotidine (PEPCID) 20 MG tablet Take 1 tablet (20 mg total) by mouth 2 (two) times daily. 180 tablet 3    fexofenadine HCl (ALLEGRA ORAL) Take 1 tablet by mouth daily as needed (allergies).      fluticasone furoate-vilanteroL (BREO) 100-25 mcg/dose diskus inhaler Inhale 1 puff into the lungs once daily. Controller. Use this inhaler every day.      fluticasone propionate (FLONASE) 50 mcg/actuation nasal spray 1 spray (50 mcg total) by Each Nostril route daily as needed for Allergies.      furosemide (LASIX) 20 MG tablet Take 4 tablets (80 mg total) by mouth 2 (two) times daily as needed. For worsening LE swelling or shortness of breath on non-dialysis days 60 tablet 11    gabapentin (NEURONTIN) 100 MG capsule Take 1 capsule (100 mg total) by mouth as needed (pain).      hydrALAZINE (APRESOLINE) 50 MG tablet Take 1 tablet (50 mg total) by mouth 3 (three) times daily. Hold until follow up with PCP or cardiologist (Patient not taking: Reported on 5/29/2024) 270 tablet 3    metoprolol succinate (TOPROL-XL) 25 MG 24 hr tablet Take 0.5 tablets (12.5 mg total) by mouth once daily. 45 tablet 3    multivitamin (THERAGRAN) per tablet Take 1 tablet by mouth  Daily.      NIFEdipine (PROCARDIA-XL) 60 MG (OSM) 24 hr tablet Take 1 tablet (60 mg total) by mouth 2 (two) times a day. Hold until follow up with PCP or cardiologist 180 tablet 3    nitroGLYCERIN (NITROSTAT) 0.4 MG SL tablet Place 1 tablet (0.4 mg total) under the tongue every 5 (five) minutes as needed for Chest pain. 25 tablet 0    omeprazole (PRILOSEC) 20 MG capsule Take 1 capsule (20 mg total) by mouth daily as needed (heartburn).      paricalcitoL (ZEMPLAR) 1 MCG capsule TAKE 1 CAPSULE ON MONDAY, WEDNESDAY AND FRIDAY (Patient not taking: Reported on 5/29/2024) 36 capsule 3    predniSONE (DELTASONE) 5 MG tablet Take 1 tablet (5 mg total) by mouth once daily. 90 tablet 3    pulse oximeter (PULSE OXIMETER) device by Apply Externally route 2 (two) times a day. Use twice daily at 8 AM and 3 PM and record the value in MyChart as directed. 1 each 0    sevelamer carbonate (RENVELA) 800 mg Tab Take 1 tablet (800 mg total) by mouth 3 (three) times daily with meals. (Patient not taking: Reported on 5/29/2024) 90 tablet 11    suvorexant (BELSOMRA) 5 mg Tab Take 5 mg by mouth nightly as needed (insomnia). (Patient not taking: Reported on 5/29/2024) 30 tablet 2    tacrolimus (PROGRAF) 1 MG Cap Take 3 capsules (3 mg total) by mouth every 12 (twelve) hours for 60 days, THEN 2 capsules (2 mg total) every 12 (twelve) hours for 60 days, THEN 1 capsule (1 mg total) every 12 (twelve) hours for 60 days, THEN 1 capsule (1 mg total) once daily. 540 capsule 4    tiotropium bromide (SPIRIVA RESPIMAT) 1.25 mcg/actuation inhaler Inhale 2 puffs into the lungs once daily. Controller. Use this inhaler every day. 4 g 11       Past Surgical History:   Procedure Laterality Date    AV FISTULA PLACEMENT Left 12/18/2023    Procedure: CREATION, AV FISTULA;  Surgeon: JUANI Melendez III, MD;  Location: [facility] OR [address] [place] [address];  Service: Vascular;  Laterality: Left;  LUE AVF creation vs AVG insertion    CARDIOVERSION  04/30/15     "CHOLECYSTECTOMY      COLONOSCOPY  April 20, 2011    Diverticulosis, repeat recommended in 3 yrs., repeat colonoscopy 2014 revealed 2 polyps.  He should return in 5 years.    COLONOSCOPY N/A 3/13/2020    Procedure: COLONOSCOPY;  Surgeon: Chon Casper MD;  Location: [facility] (4TH FLR);  Service: Endoscopy;  Laterality: N/A;  ok to hold coumadin x5days-see telephone encounter 2/4/20-tb    COLONOSCOPY N/A 2/4/2021    Procedure: COLONOSCOPY;  Surgeon: Chon Casper MD;  Location: [facility] (4TH FLR);  Service: Endoscopy;  Laterality: N/A;  Eliquis - per Dr. GAVIN Blunt ok to hold x 2 days and "restarted asap"- ERW  last prep "poor", [id number] ordered/ Pt requests Dr. Casper  prep ins. mailed - COVID screening on 2/1/21 PCW- ERW    COLONOSCOPY N/A 3/3/2021    Procedure: COLONOSCOPY;  Surgeon: Chon Casper MD;  Location: [facility] (4TH FLR);  Service: Endoscopy;  Laterality: N/A;  Patient with his second poor bowel pre and has poor prep today.  If patient not intersted or can't get colonoscopy tomorrow will need constipation bowel prep and will need to restart his Eliquis today.Thanks,Chon     per Dr Blunt-ok to hold Eliquis 2 days prior      COVID     CORONARY ANGIOGRAPHY N/A 2/28/2024    Procedure: ANGIOGRAM, CORONARY ARTERY;  Surgeon: Tylor Lincoln MD;  Location: [facility] CATH LAB;  Service: Cardiology;  Laterality: N/A;    CORONARY ANGIOPLASTY WITH STENT PLACEMENT  9/13/2006    FISTULOGRAM N/A 2/19/2024    Procedure: Fistulogram;  Surgeon: JUANI Melendez III, MD;  Location: [facility] CATH LAB;  Service: Peripheral Vascular;  Laterality: N/A;    IRRIGATION AND DEBRIDEMENT Right 8/30/2023    Procedure: IRRIGATION AND DEBRIDEMENT, RIGHT MIDDLE FINGER;  Surgeon: Martin Larsen MD;  [facility];  Service: Orthopedics;  Laterality: Right;    KIDNEY TRANSPLANT  2005    LEFT HEART CATHETERIZATION N/A 2/26/2024    Procedure: Left heart cath;  Surgeon: Tylor Lincoln MD;  " Location: [facility] CATH LAB;  Service: Cardiology;  Laterality: N/A;    PARATHYROIDECTOMY      PERCUTANEOUS TRANSLUMINAL ANGIOPLASTY OF ARTERIOVENOUS FISTULA Left 2/19/2024    Procedure: PTA, AV FISTULA;  Surgeon: JUANI Melendez III, MD;  Location: [facility] CATH LAB;  Service: Peripheral Vascular;  Laterality: Left;    STENT, DRUG ELUTING, SINGLE VESSEL, CORONARY N/A 2/28/2024    Procedure: Stent, Drug Eluting, Single Vessel, Coronary;  Surgeon: Tylor Lincoln MD;  Location: [facility] CATH LAB;  Service: Cardiology;  Laterality: N/A;    TREATMENT OF CARDIAC ARRHYTHMIA N/A 3/28/2022    Procedure: CARDIOVERSION;  Surgeon: Miuge Blunt MD;  Location: [facility] EP LAB;  Service: Cardiology;  Laterality: N/A;  AF, DCC, MAC, GP, 3 PREP*RODRIGO deferred pt 100% compliant*       Social History:  Tobacco Use: High Risk (5/29/2024)    Patient History     Smoking Tobacco Use: Every Day     Smokeless Tobacco Use: Never     Passive Exposure: Not on file       Alcohol Use: Not At Risk (4/10/2024)    AUDIT-C     Frequency of Alcohol Consumption: Never     Average Number of Drinks: Patient does not drink     Frequency of Binge Drinking: Never       Objective    Vital Signs Range:  BMI Readings from Last 1 Encounters:   05/29/24 23.92 kg/m²               Significant Labs:        Component Value Date/Time    WBC 6.43 04/12/2024 0311    HGB 6.8 (L) 04/12/2024 0311    HCT 21.9 (L) 04/12/2024 0311    HCT 27 (L) 04/09/2024 2214     04/12/2024 0311     04/12/2024 0311    K 3.5 04/12/2024 0311     04/12/2024 0311    CO2 25 04/12/2024 0311     04/12/2024 0311    BUN 33 (H) 04/12/2024 0311    CREATININE 5.9 (H) 04/12/2024 0311    MG 2.3 04/12/2024 0311    PHOS 2.7 04/12/2024 0311    CALCIUM 7.3 (L) 04/12/2024 0311    ALBUMIN 3.0 (L) 04/09/2024 1157    PROT 6.8 04/09/2024 1157    ALKPHOS 81 04/09/2024 1157    BILITOT 0.6 04/09/2024 1157    AST 16 04/09/2024 1157    ALT 17 04/09/2024 1157    INR 1.3 (H) 04/09/2024 2101     HGBA1C 5.0 04/09/2024 1157        Please see Results Review for additional labs.     Diagnostic Studies: All relevant studies, reviewed.    EKG:   Results for orders placed or performed during the hospital encounter of 04/09/24   EKG 12-lead    Collection Time: 04/29/24 10:55 AM   Result Value Ref Range    QRS Duration 98 ms    [facility] QTC Calculation 415 ms    Narrative    Test Reason : R07.9,    Vent. Rate : 063 BPM     Atrial Rate : 063 BPM     P-R Int : 168 ms          QRS Dur : 098 ms      QT Int : 406 ms       P-R-T Axes : 068 068 255 degrees     QTc Int : 415 ms    Normal sinus rhythm  LVH with repolarization abnormality ( Sokolow-Maynard , Romhilt-Dumont )  Abnormal ECG  When compared with ECG of 10-APR-2024 04:55,  Premature atrial complexes are no longer Present  Confirmed by Bee Lassiter MD ([id number]) on 4/29/2024 1:22:41 PM    Referred By: [email or web address]   SELF           Confirmed By:Bee Lassiter MD       ECHO:  Results for orders placed during the hospital encounter of 04/09/24    Echo    Interpretation Summary    Left Ventricle: The left ventricle is normal in size. Normal wall thickness. There is mildly reduced systolic function with a visually estimated ejection fraction of 45 - 50%. There is normal diastolic function.    Right Ventricle: Normal right ventricular cavity size. Wall thickness is normal. Right ventricle wall motion  is normal. Systolic function is normal.    Left Atrium: Left atrium is mildly dilated.    Right Atrium: Right atrium is mildly dilated.    Aortic Valve: There is mild stenosis. Aortic valve area by VTI is 2.03 cm². Aortic valve peak velocity is 2.73 m/s. Mean gradient is 15 mmHg. The dimensionless index is 0.49. There is mild aortic regurgitation.    Pulmonary Artery: There is mild pulmonary hypertension. The estimated pulmonary artery systolic pressure is 40 mmHg.    IVC/SVC: Normal venous pressure at 3 mmHg.        Pre-op Assessment    I have reviewed the Patient Summary  Reports.     I have reviewed the Nursing Notes. I have reviewed the NPO Status.   I have reviewed the Medications.     Review of Systems  Anesthesia Hx:             Denies Family Hx of Anesthesia complications.    Denies Personal Hx of Anesthesia complications.                    Cardiovascular:     Hypertension  Past MI CAD      Angina CHF          Cardiovascular Symptoms: Angina   Shortness of Breath    Coronary Artery Disease:          Hx of Myocardial Infarction     Congestive Heart Failure (CHF)                Hypertension         Pulmonary:   COPD Asthma  Shortness of breath     Asthma:   Chronic Obstructive Pulmonary Disease (COPD):                      Renal/:  Chronic Renal Disease        Kidney Function/Disease             Neurological:    Neuromuscular Disease,                                 Neuromuscular Disease       Physical Exam  General: Well nourished    Airway:  Mallampati: II   Mouth Opening: Normal  TM Distance: Normal  Tongue: Normal  Neck ROM: Normal ROM    Dental:  Periodontal disease, Partial Dentures  Any loose and/or missing teeth verified with patient       Anesthesia Plan  Type of Anesthesia, risks & benefits discussed:    Anesthesia Type: MAC, Gen Natural Airway, Gen Supraglottic Airway  Intra-op Monitoring Plan: Standard ASA Monitors  Post Op Pain Control Plan: multimodal analgesia and IV/PO Opioids PRN  Induction:  IV  Informed Consent: Informed consent signed with the Patient and all parties understand the risks and agree with anesthesia plan.  All questions answered.   ASA Score: 3  Day of Surgery Review of History & Physical: H&P Update referred to the surgeon/provider.    Ready For Surgery From Anesthesia Perspective.     .

## 2024-06-03 ENCOUNTER — ANESTHESIA (OUTPATIENT)
Dept: SURGERY | Facility: HOSPITAL | Age: 69
End: 2024-06-03
Payer: MEDICARE

## 2024-06-03 ENCOUNTER — HOSPITAL ENCOUNTER (OUTPATIENT)
Facility: HOSPITAL | Age: 69
Discharge: HOME | End: 2024-06-03
Attending: SURGERY | Admitting: SURGERY
Payer: MEDICARE

## 2024-06-03 VITALS
HEIGHT: 73 IN | SYSTOLIC BLOOD PRESSURE: 158 MMHG | WEIGHT: 176 LBS | RESPIRATION RATE: 17 BRPM | HEART RATE: 51 BPM | DIASTOLIC BLOOD PRESSURE: 72 MMHG | TEMPERATURE: 98 F | BODY MASS INDEX: 23.33 KG/M2 | OXYGEN SATURATION: 100 %

## 2024-06-03 DIAGNOSIS — Z99.2 ESRD (END STAGE RENAL DISEASE) ON DIALYSIS: ICD-10-CM

## 2024-06-03 DIAGNOSIS — N18.6 ESRD (END STAGE RENAL DISEASE) ON DIALYSIS: ICD-10-CM

## 2024-06-03 PROCEDURE — 63600175 PHARM REV CODE 636 W HCPCS: Mod: HCNC | Performed by: STUDENT IN AN ORGANIZED HEALTH CARE EDUCATION/TRAINING PROGRAM

## 2024-06-03 PROCEDURE — C1894 INTRO/SHEATH, NON-LASER: HCPCS | Mod: HCNC | Performed by: SURGERY

## 2024-06-03 PROCEDURE — 71000044 HC DOSC ROUTINE RECOVERY FIRST HOUR: Mod: HCNC | Performed by: SURGERY

## 2024-06-03 PROCEDURE — 25500020 PHARM REV CODE 255: Mod: HCNC | Performed by: SURGERY

## 2024-06-03 PROCEDURE — 25000003 PHARM REV CODE 250: Mod: HCNC | Performed by: STUDENT IN AN ORGANIZED HEALTH CARE EDUCATION/TRAINING PROGRAM

## 2024-06-03 PROCEDURE — 36000707: Mod: HCNC | Performed by: SURGERY

## 2024-06-03 PROCEDURE — C1769 GUIDE WIRE: HCPCS | Mod: HCNC | Performed by: SURGERY

## 2024-06-03 PROCEDURE — 63600175 PHARM REV CODE 636 W HCPCS: Mod: HCNC | Performed by: SURGERY

## 2024-06-03 PROCEDURE — 25000003 PHARM REV CODE 250: Mod: HCNC | Performed by: SURGERY

## 2024-06-03 PROCEDURE — 37000009 HC ANESTHESIA EA ADD 15 MINS: Mod: HCNC | Performed by: SURGERY

## 2024-06-03 PROCEDURE — 36902 INTRO CATH DIALYSIS CIRCUIT: CPT | Mod: HCNC,,, | Performed by: SURGERY

## 2024-06-03 PROCEDURE — 37000008 HC ANESTHESIA 1ST 15 MINUTES: Mod: HCNC | Performed by: SURGERY

## 2024-06-03 PROCEDURE — 36000706: Mod: HCNC | Performed by: SURGERY

## 2024-06-03 PROCEDURE — 71000015 HC POSTOP RECOV 1ST HR: Mod: HCNC | Performed by: SURGERY

## 2024-06-03 PROCEDURE — 27201423 OPTIME MED/SURG SUP & DEVICES STERILE SUPPLY: Mod: HCNC | Performed by: SURGERY

## 2024-06-03 PROCEDURE — C1725 CATH, TRANSLUMIN NON-LASER: HCPCS | Mod: HCNC | Performed by: SURGERY

## 2024-06-03 PROCEDURE — 36907 BALO ANGIOP CTR DIALYSIS SEG: CPT | Mod: HCNC,,, | Performed by: SURGERY

## 2024-06-03 RX ORDER — HEPARIN SODIUM 1000 [USP'U]/ML
INJECTION, SOLUTION INTRAVENOUS; SUBCUTANEOUS
Status: DISCONTINUED | OUTPATIENT
Start: 2024-06-03 | End: 2024-06-03 | Stop reason: HOSPADM

## 2024-06-03 RX ORDER — PROPOFOL 10 MG/ML
VIAL (ML) INTRAVENOUS
Status: DISCONTINUED | OUTPATIENT
Start: 2024-06-03 | End: 2024-06-03

## 2024-06-03 RX ORDER — ONDANSETRON HYDROCHLORIDE 2 MG/ML
4 INJECTION, SOLUTION INTRAVENOUS DAILY PRN
Status: DISCONTINUED | OUTPATIENT
Start: 2024-06-03 | End: 2024-06-03 | Stop reason: HOSPADM

## 2024-06-03 RX ORDER — HYDROMORPHONE HYDROCHLORIDE 1 MG/ML
0.2 INJECTION, SOLUTION INTRAMUSCULAR; INTRAVENOUS; SUBCUTANEOUS EVERY 5 MIN PRN
Status: DISCONTINUED | OUTPATIENT
Start: 2024-06-03 | End: 2024-06-03 | Stop reason: HOSPADM

## 2024-06-03 RX ORDER — IODIXANOL 320 MG/ML
INJECTION, SOLUTION INTRAVASCULAR
Status: DISCONTINUED | OUTPATIENT
Start: 2024-06-03 | End: 2024-06-03 | Stop reason: HOSPADM

## 2024-06-03 RX ORDER — LIDOCAINE HYDROCHLORIDE 20 MG/ML
INJECTION, SOLUTION EPIDURAL; INFILTRATION; INTRACAUDAL; PERINEURAL
Status: DISCONTINUED | OUTPATIENT
Start: 2024-06-03 | End: 2024-06-03

## 2024-06-03 RX ORDER — LIDOCAINE HYDROCHLORIDE 10 MG/ML
INJECTION INFILTRATION; PERINEURAL
Status: DISCONTINUED | OUTPATIENT
Start: 2024-06-03 | End: 2024-06-03 | Stop reason: HOSPADM

## 2024-06-03 RX ORDER — DEXMEDETOMIDINE HYDROCHLORIDE 100 UG/ML
INJECTION, SOLUTION INTRAVENOUS
Status: DISCONTINUED | OUTPATIENT
Start: 2024-06-03 | End: 2024-06-03

## 2024-06-03 RX ORDER — FENTANYL CITRATE 50 UG/ML
INJECTION, SOLUTION INTRAMUSCULAR; INTRAVENOUS
Status: DISCONTINUED | OUTPATIENT
Start: 2024-06-03 | End: 2024-06-03

## 2024-06-03 RX ADMIN — FENTANYL CITRATE 25 MCG: 50 INJECTION, SOLUTION INTRAMUSCULAR; INTRAVENOUS at 09:06

## 2024-06-03 RX ADMIN — FENTANYL CITRATE 50 MCG: 50 INJECTION, SOLUTION INTRAMUSCULAR; INTRAVENOUS at 08:06

## 2024-06-03 RX ADMIN — PROPOFOL 20 MG: 10 INJECTION, EMULSION INTRAVENOUS at 08:06

## 2024-06-03 RX ADMIN — DEXMEDETOMIDINE 8 MCG: 100 INJECTION, SOLUTION, CONCENTRATE INTRAVENOUS at 08:06

## 2024-06-03 RX ADMIN — LIDOCAINE HYDROCHLORIDE 75 MG: 20 INJECTION, SOLUTION EPIDURAL; INFILTRATION; INTRACAUDAL at 08:06

## 2024-06-03 RX ADMIN — DEXTROSE 2 G: 50 INJECTION, SOLUTION INTRAVENOUS at 08:06

## 2024-06-03 RX ADMIN — SODIUM CHLORIDE, SODIUM GLUCONATE, SODIUM ACETATE, POTASSIUM CHLORIDE, MAGNESIUM CHLORIDE, SODIUM PHOSPHATE, DIBASIC, AND POTASSIUM PHOSPHATE: .53; .5; .37; .037; .03; .012; .00082 INJECTION, SOLUTION INTRAVENOUS at 08:06

## 2024-06-03 NOTE — ANESTHESIA POSTPROCEDURE EVALUATION
Anesthesia Post Evaluation    Patient: Ibrahima Phelps    Procedure(s) Performed: Procedure(s) (LRB):  FISTULOGRAM, WITH PTA (Left)  ANGIOPLASTY, VEIN    Final Anesthesia Type: MAC      Patient location during evaluation: PACU  Patient participation: Yes- Able to Participate  Level of consciousness: awake and alert  Post-procedure vital signs: reviewed and stable  Pain management: adequate  Airway patency: patent    PONV status at discharge: No PONV  Anesthetic complications: no      Cardiovascular status: blood pressure returned to baseline and stable  Respiratory status: unassisted and nasal cannula  Hydration status: euvolemic  Follow-up not needed.              Vitals Value Taken Time   /72 06/03/24 1031   Temp 36.7 °C (98 °F) 06/03/24 0930   Pulse 48 06/03/24 1045   Resp 24 06/03/24 1044   SpO2 100 % 06/03/24 1044   Vitals shown include unfiled device data.      No case tracking events are documented in the log.      Pain/Stephane Score: Stephane Score: 9 (6/3/2024 10:00 AM)

## 2024-06-03 NOTE — PROGRESS NOTES
Patient has been discharged home with his wife Shea. Patient was alert, awake, oriented times four. Discharge instructions has been gone over with patient and wife. Has been told to restart his eliquis therapy on tomorrow.

## 2024-06-03 NOTE — TRANSFER OF CARE
"Anesthesia Transfer of Care Note    Patient: Ibrahima Phelps    Procedure(s) Performed: Procedure(s) (LRB):  FISTULOGRAM, WITH PTA (Left)    Patient location: [facility]    Anesthesia Type: general    Transport from OR: Transported from OR on 6-10 L/min O2 by face mask with adequate spontaneous ventilation    Post pain: adequate analgesia    Post assessment: tolerated procedure well and no apparent anesthetic complications    Post vital signs: stable    Level of consciousness: awake    Nausea/Vomiting: no nausea/vomiting    Complications: none    Transfer of care protocol was followed    Last vitals: Visit Vitals  BP (!) 187/84 (BP Location: Right arm, Patient Position: Lying)   Pulse 60   Temp 37.2 °C (99 °F)   Resp 20   Ht 6' 1" (1.854 m)   Wt 79.8 kg (176 lb)   SpO2 100%   BMI 23.22 kg/m²     "

## 2024-06-03 NOTE — DISCHARGE INSTRUCTIONS
Once you go home, please abide by the following restrictions:    Rest in bed or a recliner for the remainder of the day following the procedure.     You should not go home alone the day of the procedure.    Lifting and activity restrictions:   - Do not lift more than 5 pounds for the first 24 hours and do not lift more than 10 pounds with the affected arm for 1 week.   - Do not use affected arm for pulling up in bed, adjusting position, or weight bearing exercise.   - Avoid flexing the wrist, such as hammering, playing tennis, or swinging objects.   - Avoid moisture or submerging hand for 7 days (tub, swimming, dishes). Showering is OK.    Keep an adhesive bandage on the catheter insertion site for one day to help prevent infection.    Because of the sedation you were given for your procedure, we recommend that you have someone stay with you overnight following your procedure.  - Do not drive a car or operate machinery for the remainder of the day.  - Do not consume any alcoholic beverages for the remainder of the day.  - Postpone signing any important papers or making any important decision for the remainder of the day.  - Do not take any muscle relaxants, sedatives, hypnotics, or mood-altering medication today unless ordered by your physician who is aware you are taking the medication today.    If bleeding occurs:  - Apply manual pressure, and immediately seek medical attention at the nearest hospital.   - Seek immediate medical attention if you experience loss of sensation, redness, swelling or discharge from the insertion site.

## 2024-06-03 NOTE — BRIEF OP NOTE
[facility] - Surgery ([facility])  Brief Operative Note    Surgery Date: 6/3/2024     Surgeons and Role:     * JUANI Melendez III, MD - Primary     * Juan Vega MD - Fellow    Assisting Surgeon: None    Pre-op Diagnosis:  Pseudoaneurysm of arteriovenous dialysis fistula, initial encounter [T82.510A]    Post-op Diagnosis:  Post-Op Diagnosis Codes:     * Pseudoaneurysm of arteriovenous dialysis fistula, initial encounter [T82.510A]    PROCEDURES:    PTA, L AVF (10x40)  PTA, L subclavian vein (12x40 Ultraverse)  L fistualgram    Anesthesia: Local MAC    Operative Findings: >50% subclavian vein stenosis, >75 % AVF stenosis, < 30% after with improved thrill.    Large pseudoaneurysm with trace filling - no Rxed as this will thrombose    Estimated Blood Loss: <5cc         Specimens:   Specimen (24h ago, onward)      None              Discharge Note    OUTCOME: successful outpatient procedure     DISPOSITION: home    FINAL DIAGNOSIS:  stenosis AVF    FOLLOWUP: Fri 6/7 with duplex    DISCHARGE INSTRUCTIONS:  see below

## 2024-06-05 ENCOUNTER — TELEPHONE (OUTPATIENT)
Dept: VASCULAR SURGERY | Facility: CLINIC | Age: 69
End: 2024-06-05
Payer: MEDICARE

## 2024-06-05 DIAGNOSIS — N18.6 ESRD (END STAGE RENAL DISEASE) ON DIALYSIS: Primary | ICD-10-CM

## 2024-06-05 DIAGNOSIS — Z99.2 ESRD (END STAGE RENAL DISEASE) ON DIALYSIS: Primary | ICD-10-CM

## 2024-06-05 NOTE — OP NOTE
[facility] - Surgery ([facility])  Brief Operative Note     Surgery Date: 6/3/2024      Surgeons and Role:     * JUANI Melendez III, MD - Primary     * Juan Vega MD - Fellow     Assisting Surgeon: None     Pre-op Diagnosis:  Pseudoaneurysm of arteriovenous dialysis fistula, initial encounter [T82.510A]     Post-op Diagnosis:  Post-Op Diagnosis Codes:     * Pseudoaneurysm of arteriovenous dialysis fistula, initial encounter [T82.510A]     PROCEDURES:     PTA, L AVF (10x40)  PTA, L subclavian vein (12x40 Ultraverse)  L fistualgram     Anesthesia: Local MAC     Operative Findings: >50% subclavian vein stenosis, >75 % AVF stenosis, < 30% after with improved thrill.     Large pseudoaneurysm with trace filling - no Rxed as this will thrombose     Estimated Blood Loss: <5cc         Specimens:   Specimen (24h ago, onward)        None       Indications:  68-year-old man past medical history significant for end-stage renal disease found to have a pseudoaneurysms from access of his left brachiocephalic AV fistula as well as aneurysmal degeneration of the outflow.  Patient was consented for a fistulogram.      Procedure details:   After obtaining signed consent for the above procedure, the patient was brought to the operating theater and placed in supine position.  Sedation was provided by anesthesia, preoperative antibiotics were administered.  The left arm was prepped and draped in the usual sterile fashion.  A surgical time-out was performed confirming patient identity, laterality, and procedure.  We started by accessing the left brachiocephalic AV fistula with a micropuncture needle followed by 0.018 in wire and microcatheter.  A fistulogram and venogram were performed from this location.  This identified a greater than 50% subclavian vein stenosis and greater than 75% AV fistula stenosis.  Decision was made to treat.  A 0.035 in glidewire was advanced through the fistula into the central venous system.  A 7  [place] sheath was then advanced over the wire.  The subclavian vein stenosis was treated initially with a 10 x 40 mm Ultraverse, however this was inadequate and there was residual stenosis so a 12 x 40 mm [place] balloon was then used to dilate with less than 30% stenosis residual.  Next, we treated the AV fistula stenosis with a 10 x 40 mm Ultraverse balloon.  This left 30% residual stenosis.  The fistula had better thrill.  On her imaging, there was a small amount of pseudoaneurysms filling however, it was thought that this would thrombose on its own and so we elected not to treat this area.  The sheath and wire were removed in the access site made hemostatic with a U-stitch Monocryl suture.  Sterile dressings were applied.      Patient tolerated the procedure well was transported to the recovery room in good condition.      Dr. Melendez was present for the entire procedure.    Juan Vega  Vascular Surgery Fellow, PGY-7  [phone]

## 2024-06-05 NOTE — TELEPHONE ENCOUNTER
Attempted to contact pt and wife to schedule FU appts. Voice messages left for both with appt information and requesting return call for questions or concerns.----- Message from JUANI Melendez III, MD sent at 6/3/2024  9:28 AM CDT -----  F/u Fri 6/7 with AVF duplex

## 2024-06-06 ENCOUNTER — PATIENT MESSAGE (OUTPATIENT)
Dept: ADMINISTRATIVE | Facility: HOSPITAL | Age: 69
End: 2024-06-06
Payer: MEDICARE

## 2024-06-07 ENCOUNTER — OFFICE VISIT (OUTPATIENT)
Dept: VASCULAR SURGERY | Facility: CLINIC | Age: 69
End: 2024-06-07
Attending: SURGERY
Payer: MEDICARE

## 2024-06-07 ENCOUNTER — HOSPITAL ENCOUNTER (OUTPATIENT)
Dept: VASCULAR SURGERY | Facility: CLINIC | Age: 69
Discharge: HOME | End: 2024-06-07
Attending: SURGERY
Payer: MEDICARE

## 2024-06-07 VITALS
TEMPERATURE: 98 F | WEIGHT: 175.94 LBS | DIASTOLIC BLOOD PRESSURE: 79 MMHG | SYSTOLIC BLOOD PRESSURE: 185 MMHG | HEIGHT: 73 IN | HEART RATE: 53 BPM | BODY MASS INDEX: 23.32 KG/M2

## 2024-06-07 DIAGNOSIS — N18.6 ESRD (END STAGE RENAL DISEASE) ON DIALYSIS: ICD-10-CM

## 2024-06-07 DIAGNOSIS — Z99.2 ESRD (END STAGE RENAL DISEASE) ON DIALYSIS: ICD-10-CM

## 2024-06-07 DIAGNOSIS — Z99.2 ESRD (END STAGE RENAL DISEASE) ON DIALYSIS: Primary | ICD-10-CM

## 2024-06-07 DIAGNOSIS — N18.6 ESRD (END STAGE RENAL DISEASE) ON DIALYSIS: Primary | ICD-10-CM

## 2024-06-07 PROCEDURE — 1159F MED LIST DOCD IN RCRD: CPT | Mod: HCNC,CPTII,S$GLB, | Performed by: SURGERY

## 2024-06-07 PROCEDURE — 1101F PT FALLS ASSESS-DOCD LE1/YR: CPT | Mod: HCNC,CPTII,S$GLB, | Performed by: SURGERY

## 2024-06-07 PROCEDURE — 3008F BODY MASS INDEX DOCD: CPT | Mod: HCNC,CPTII,S$GLB, | Performed by: SURGERY

## 2024-06-07 PROCEDURE — 1126F AMNT PAIN NOTED NONE PRSNT: CPT | Mod: HCNC,CPTII,S$GLB, | Performed by: SURGERY

## 2024-06-07 PROCEDURE — 93990 DOPPLER FLOW TESTING: CPT | Mod: HCNC,S$GLB,, | Performed by: SURGERY

## 2024-06-07 PROCEDURE — 3288F FALL RISK ASSESSMENT DOCD: CPT | Mod: HCNC,CPTII,S$GLB, | Performed by: SURGERY

## 2024-06-07 PROCEDURE — 99999 PR PBB SHADOW E&M-EST. PATIENT-LVL V: CPT | Mod: PBBFAC,HCNC,, | Performed by: SURGERY

## 2024-06-07 PROCEDURE — 3066F NEPHROPATHY DOC TX: CPT | Mod: HCNC,CPTII,S$GLB, | Performed by: SURGERY

## 2024-06-07 PROCEDURE — 3044F HG A1C LEVEL LT 7.0%: CPT | Mod: HCNC,CPTII,S$GLB, | Performed by: SURGERY

## 2024-06-07 PROCEDURE — 99214 OFFICE O/P EST MOD 30 MIN: CPT | Mod: HCNC,S$GLB,, | Performed by: SURGERY

## 2024-06-07 PROCEDURE — 3077F SYST BP >= 140 MM HG: CPT | Mod: HCNC,CPTII,S$GLB, | Performed by: SURGERY

## 2024-06-07 PROCEDURE — 1160F RVW MEDS BY RX/DR IN RCRD: CPT | Mod: HCNC,CPTII,S$GLB, | Performed by: SURGERY

## 2024-06-07 PROCEDURE — 3078F DIAST BP <80 MM HG: CPT | Mod: HCNC,CPTII,S$GLB, | Performed by: SURGERY

## 2024-06-07 NOTE — PROGRESS NOTES
VASCULAR SURGERY SERVICE    REFERRING DOCTOR: Emre Latif MD    CHIEF COMPLAINT: CKD 5    HISTORY OF PRESENT ILLNESS: Ibrahima Phelps is a 68 y.o. male status post 2 kidney transplantations the last in 2005, now with the client renal function with a GFR of 9.7 not yet initiated hemodialysis.  He is right-handed.  He would a Veronica AV fistula in the left many many years ago.    S/p    PTA, left AV fistula 6/3/2024, 02/19/2024  left brachiocephalic AV fistula creation 12/18/2023    He is taking Eliquis for atrial fibrillation.    He is no history of AICD or pacemaker placement    02/06/2024:  This is initial follow-up visit status post left brachiocephalic AV fistula creation 12/18/2023.  He has not initiated hemodialysis.  Denies any hand pain weakness or numbness.  This fistula was felt likely that he had a outflow vein stenosis in the cephalic vein    03/22/2024:  This is postoperative appointment after angioplasty of his left AV fistula 1 month ago.  He subsequently initiated hemodialysis proximally 3 weeks ago.  He would 1 episode of infiltration last week but none since    05/24/2024:  Now presents for follow-up.  Since then notes that his hemodialysis has been working through his fistula without any sort of issues.  No more issues of infiltration, heavy bleeding.  Notes that the hematoma that was present prior in March as still been there, largely unchanged.    06/07/2024:  He now returns after recent fistulogram.  This was prompted by a large mid pseudoaneurysm.  However when he presented with a fistulogram the very pulsatile pseudoaneurysms was largely nonpulsatile.  The fistulogram revealed only trace filling of the pseudoaneurysms and thus I did not place a covered stent.  However there were 2-3 areas of outflow stenosis of the cephalic arch which I treated.    He has having no problems using the fistula      Past Medical History:   Diagnosis Date    Atrial fibrillation     CAD (coronary artery  "disease) 2006    MI in 2006    CHF (congestive heart failure) 2017    CKD (chronic kidney disease) stage 3, GFR 30-59 ml/min     Dr. Latif.  in transplanted kidney    CKD (chronic kidney disease) stage 5, GFR less than 15 ml/min 02/02/2024    COVID-19 02/07/2023    Diverticulosis     Hyperlipidemia     Hypertension     Keloid cicatrix     Metabolic bone disease     Other emphysema 10/01/2019    Pericarditis     S/P kidney transplant 1992    x2 (1992 & 2005),    Thrombocytopenia     Thyroid disease     Tobacco use 09/05/2014       Past Surgical History:   Procedure Laterality Date    AV FISTULA PLACEMENT Left 12/18/2023    Procedure: CREATION, AV FISTULA;  Surgeon: JUANI Melendez III, MD;  [facility];  Service: Vascular;  Laterality: Left;  LUE AVF creation vs AVG insertion    CARDIOVERSION  04/30/15    CHOLECYSTECTOMY      COLONOSCOPY  April 20, 2011    Diverticulosis, repeat recommended in 3 yrs., repeat colonoscopy 2014 revealed 2 polyps.  He should return in 5 years.    COLONOSCOPY N/A 3/13/2020    Procedure: COLONOSCOPY;  Surgeon: Chon Casper MD;  Location: [facility] (4TH FLR);  Service: Endoscopy;  Laterality: N/A;  ok to hold coumadin x5days-see telephone encounter 2/4/20-tb    COLONOSCOPY N/A 2/4/2021    Procedure: COLONOSCOPY;  Surgeon: Chon Casper MD;  Location: [facility] (4TH FLR);  Service: Endoscopy;  Laterality: N/A;  Eliquis - per Dr. GAVIN Blunt ok to hold x 2 days and "restarted asap"- ERW  last prep "poor", [id number] ordered/ Pt requests Dr. Casper  prep ins. mailed - COVID screening on 2/1/21 PCW- ERW    COLONOSCOPY N/A 3/3/2021    Procedure: COLONOSCOPY;  Surgeon: Chon Casper MD;  Location: [facility] (4TH FLR);  Service: Endoscopy;  Laterality: N/A;  Patient with his second poor bowel pre and has poor prep today.  If patient not intersted or can't get colonoscopy tomorrow will need constipation bowel prep and will need to restart his Eliquis today.Thanks,Chon     " per Dr Lopez to hold Eliquis 2 days prior      COVID     CORONARY ANGIOGRAPHY N/A 2/28/2024    Procedure: ANGIOGRAM, CORONARY ARTERY;  Surgeon: Tylor Lincoln MD;  Location: [facility] CATH LAB;  Service: Cardiology;  Laterality: N/A;    CORONARY ANGIOPLASTY WITH STENT PLACEMENT  9/13/2006    FISTULOGRAM N/A 2/19/2024    Procedure: Fistulogram;  Surgeon: JUANI Melendez III, MD;  Location: [facility] CATH LAB;  Service: Peripheral Vascular;  Laterality: N/A;    FISTULOGRAM, WITH PTA Left 6/3/2024    Procedure: FISTULOGRAM, WITH PTA;  Surgeon: JUANI Melendez III, MD;  Location: [facility] OR [facility] FLR;  Service: Vascular;  Laterality: Left;  mGy: 51.58  GyCm2: 6.8285  Fluoro time: 5.1 min  Contrast: 28 cc    IRRIGATION AND DEBRIDEMENT Right 8/30/2023    Procedure: IRRIGATION AND DEBRIDEMENT, RIGHT MIDDLE FINGER;  Surgeon: Martin Larsen MD;  [facility];  Service: Orthopedics;  Laterality: Right;    KIDNEY TRANSPLANT  2005    LEFT HEART CATHETERIZATION N/A 2/26/2024    Procedure: Left heart cath;  Surgeon: Tylor Lincoln MD;  Location: [facility] CATH LAB;  Service: Cardiology;  Laterality: N/A;    PARATHYROIDECTOMY      PERCUTANEOUS TRANSLUMINAL ANGIOPLASTY OF ARTERIOVENOUS FISTULA Left 2/19/2024    Procedure: PTA, AV FISTULA;  Surgeon: JUANI Melendez III, MD;  Location: [facility] CATH LAB;  Service: Peripheral Vascular;  Laterality: Left;    STENT, DRUG ELUTING, SINGLE VESSEL, CORONARY N/A 2/28/2024    Procedure: Stent, Drug Eluting, Single Vessel, Coronary;  Surgeon: Tylor Lincoln MD;  Location: [facility] CATH LAB;  Service: Cardiology;  Laterality: N/A;    TREATMENT OF CARDIAC ARRHYTHMIA N/A 3/28/2022    Procedure: CARDIOVERSION;  Surgeon: Migue Blunt MD;  Location: [facility] EP LAB;  Service: Cardiology;  Laterality: N/A;  AF, DCC, MAC, GP, 3 PREP*RODRIGO deferred pt 100% compliant*    VENOPLASTY  6/3/2024    Procedure: ANGIOPLASTY, VEIN;  Surgeon: JUANI Melendez III, MD;  Location: [facility] OR 2ND FLR;   Service: Vascular;;  Left subclavian         Current Outpatient Medications:     acetaminophen (TYLENOL) 500 MG tablet, Take 1 tablet (500 mg total) by mouth every 6 (six) hours as needed for Pain., Disp: 20 tablet, Rfl: 0    albuterol (VENTOLIN HFA) 90 mcg/actuation inhaler, Inhale 2 puffs into the lungs every 6 (six) hours as needed for Wheezing or Shortness of Breath. Rescue, Disp: 18 g, Rfl: 3    albuterol-ipratropium (DUO-NEB) 2.5 mg-0.5 mg/3 mL nebulizer solution, Take 3 mLs by nebulization every 4 (four) hours as needed for Wheezing. Rescue, Disp: 75 mL, Rfl: 0    amiodarone (PACERONE) 200 MG Tab, Take 1 tablet (200 mg total) by mouth once daily., Disp: 30 tablet, Rfl: 11    apixaban (ELIQUIS) 5 mg Tab, Take 1 tablet (5 mg total) by mouth 2 (two) times daily., Disp: 180 tablet, Rfl: 3    atorvastatin (LIPITOR) 80 MG tablet, Take 1 tablet (80 mg total) by mouth once daily., Disp: 90 tablet, Rfl: 3    b complex vitamins (B COMPLEX-VITAMIN B12) tablet, Take 1 tablet by mouth once daily., Disp: 90 tablet, Rfl: 3    calcium carbonate (CALCIUM 600 ORAL), Take 1 tablet by mouth 2 (two) times a day., Disp: , Rfl:     CHOLECALCIFEROL, VITAMIN D3, ORAL, Take 2 tablets by mouth 2 (two) times daily., Disp: , Rfl:     clopidogreL (PLAVIX) 75 mg tablet, Take 1 tablet (75 mg total) by mouth once daily., Disp: 90 tablet, Rfl: 3    doxazosin (CARDURA) 4 MG tablet, Take 1 tablet (4 mg total) by mouth every 12 (twelve) hours., Disp: 180 tablet, Rfl: 3    fluticasone furoate-vilanteroL (BREO) 100-25 mcg/dose diskus inhaler, Inhale 1 puff into the lungs once daily. Controller. Use this inhaler every day., Disp: , Rfl:     furosemide (LASIX) 20 MG tablet, Take 4 tablets (80 mg total) by mouth 2 (two) times daily as needed. For worsening LE swelling or shortness of breath on non-dialysis days, Disp: 60 tablet, Rfl: 11    gabapentin (NEURONTIN) 100 MG capsule, Take 1 capsule (100 mg total) by mouth as needed (pain)., Disp: , Rfl:      metoprolol succinate (TOPROL-XL) 25 MG 24 hr tablet, Take 0.5 tablets (12.5 mg total) by mouth once daily., Disp: 45 tablet, Rfl: 3    multivitamin (THERAGRAN) per tablet, Take 1 tablet by mouth Daily., Disp: , Rfl:     omeprazole (PRILOSEC) 20 MG capsule, Take 1 capsule (20 mg total) by mouth daily as needed (heartburn)., Disp: , Rfl:     paricalcitoL (ZEMPLAR) 1 MCG capsule, TAKE 1 CAPSULE ON MONDAY, WEDNESDAY AND FRIDAY, Disp: 36 capsule, Rfl: 3    predniSONE (DELTASONE) 5 MG tablet, Take 1 tablet (5 mg total) by mouth once daily., Disp: 90 tablet, Rfl: 3    tacrolimus (PROGRAF) 1 MG Cap, Take 3 capsules (3 mg total) by mouth every 12 (twelve) hours for 60 days, THEN 2 capsules (2 mg total) every 12 (twelve) hours for 60 days, THEN 1 capsule (1 mg total) every 12 (twelve) hours for 60 days, THEN 1 capsule (1 mg total) once daily., Disp: 540 capsule, Rfl: 4    famotidine (PEPCID) 20 MG tablet, Take 1 tablet (20 mg total) by mouth 2 (two) times daily. (Patient not taking: Reported on 6/7/2024), Disp: 180 tablet, Rfl: 3    fexofenadine HCl (ALLEGRA ORAL), Take 1 tablet by mouth daily as needed (allergies). (Patient not taking: Reported on 6/7/2024), Disp: , Rfl:     fluticasone propionate (FLONASE) 50 mcg/actuation nasal spray, 1 spray (50 mcg total) by Each Nostril route daily as needed for Allergies. (Patient not taking: Reported on 6/7/2024), Disp: , Rfl:     hydrALAZINE (APRESOLINE) 50 MG tablet, Take 1 tablet (50 mg total) by mouth 3 (three) times daily. Hold until follow up with PCP or cardiologist (Patient not taking: Reported on 5/29/2024), Disp: 270 tablet, Rfl: 3    NIFEdipine (PROCARDIA-XL) 60 MG (OSM) 24 hr tablet, Take 1 tablet (60 mg total) by mouth 2 (two) times a day. Hold until follow up with PCP or cardiologist (Patient not taking: Reported on 6/7/2024), Disp: 180 tablet, Rfl: 3    nitroGLYCERIN (NITROSTAT) 0.4 MG SL tablet, Place 1 tablet (0.4 mg total) under the tongue every 5 (five) minutes as  needed for Chest pain. (Patient not taking: Reported on 6/7/2024), Disp: 25 tablet, Rfl: 0    pulse oximeter (PULSE OXIMETER) device, by Apply Externally route 2 (two) times a day. Use twice daily at 8 AM and 3 PM and record the value in MyChart as directed. (Patient not taking: Reported on 6/7/2024), Disp: 1 each, Rfl: 0    sevelamer carbonate (RENVELA) 800 mg Tab, Take 1 tablet (800 mg total) by mouth 3 (three) times daily with meals. (Patient not taking: Reported on 5/29/2024), Disp: 90 tablet, Rfl: 11    suvorexant (BELSOMRA) 5 mg Tab, Take 5 mg by mouth nightly as needed (insomnia). (Patient not taking: Reported on 6/7/2024), Disp: 30 tablet, Rfl: 2    tiotropium bromide (SPIRIVA RESPIMAT) 1.25 mcg/actuation inhaler, Inhale 2 puffs into the lungs once daily. Controller. Use this inhaler every day. (Patient not taking: Reported on 6/7/2024), Disp: 4 g, Rfl: 11    Review of patient's allergies indicates:   Allergen Reactions    Ace inhibitors Swelling    Verapamil Other (See Comments)     Other reaction(s): Unknown    Povidone-iodine Itching       Family History   Problem Relation Name Age of Onset    No Known Problems Sister      No Known Problems Brother      Thyroid disease Mother          s/p surgery    Heart disease Father          had pacemaker    No Known Problems Sister      Kidney failure Sister      Kidney disease Sister      No Known Problems Sister      Kidney disease Brother      Kidney failure Brother          s/p transplant    Diabetes Mellitus Neg Hx      Stroke Neg Hx      Heart attack Neg Hx      Cancer Neg Hx      Celiac disease Neg Hx      Cirrhosis Neg Hx      Colon cancer Neg Hx      Esophageal cancer Neg Hx      Inflammatory bowel disease Neg Hx      Rectal cancer Neg Hx      Stomach cancer Neg Hx      Ulcerative colitis Neg Hx      Liver disease Neg Hx      Liver cancer Neg Hx      Crohn's disease Neg Hx      Melanoma Neg Hx         Social History     Tobacco Use    Smoking status: Every  "Day     Current packs/day: 0.00     Average packs/day: 0.5 packs/day for 40.0 years (20.0 ttl pk-yrs)     Types: Cigarettes     Start date: 1982     Last attempt to quit: 2022     Years since quittin.2    Smokeless tobacco: Never    Tobacco comments:     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     Patient is currently retired   Substance Use Topics    Alcohol use: No    Drug use: No       PHYSICAL EXAM:   BP (!) 185/79 (BP Location: Right arm, Patient Position: Sitting, BP Method: Medium (Automatic))   Pulse (!) 53   Temp 97.8 °F (36.6 °C) (Oral)   Ht 6' 1" (1.854 m)   Wt 79.8 kg (175 lb 14.8 oz)   BMI 23.21 kg/m²   Constitutional:  Alert,   Well-appearing  In no distress.   Neurological: Normal speech  no focal findings  CN II - XII grossly intact.    Psychiatric: Mood and affect appropriate and symmetric.   HEENT: Normocephalic / atraumatic  PERRLA  Midline trachea  No scars across the neck   Cardiac: Regular rate and rhythm.   Pulmonary: Normal pulmonary effort.   Abdomen: Soft, not distended.     Skin: Warm and well perfused.    Vascular:  Right radial pulse 1 to 2+, left brachial pulse 3 +   Extremities/  Musculoskeletal: Left arm shows a good thrill with minimal pulsatility of the fistula.  Hematoma present with a essentially no pulsatility    There is a much smaller hematoma and modest ecchymosis in the upper arm in an region of recent angioplasty     2024    IMAGING:  Duplex of the fistula shows no significant stenosis, flow volume 3.2 L    Recent fistulagram personally reviewed      IMPRESSION:  Well matured now LEFT high flow AV fistula, overall working well.  The large mid fistula pseudoaneurysms near completely thrombosed, believe this will fully thrombosed and continue to get smaller over time    PLAN:  Follow up in 4 months with AV fistula duplex sooner if clinically indicated    INDIGO Melendez III, MD, FACS  Professor and Chief, " Vascular and Endovascular Surgery

## 2024-06-10 ENCOUNTER — PATIENT MESSAGE (OUTPATIENT)
Dept: INTERNAL MEDICINE | Facility: CLINIC | Age: 69
End: 2024-06-10
Payer: MEDICARE

## 2024-06-11 ENCOUNTER — LAB VISIT (OUTPATIENT)
Dept: LAB | Facility: HOSPITAL | Age: 69
End: 2024-06-11
Payer: MEDICARE

## 2024-06-11 DIAGNOSIS — E11.9 TYPE 2 DIABETES MELLITUS WITHOUT COMPLICATION: ICD-10-CM

## 2024-06-11 DIAGNOSIS — I25.10 CORONARY ARTERY DISEASE INVOLVING NATIVE CORONARY ARTERY OF NATIVE HEART WITHOUT ANGINA PECTORIS: Chronic | ICD-10-CM

## 2024-06-11 LAB
CHOLEST SERPL-MCNC: 109 MG/DL (ref 120–199)
CHOLEST SERPL-MCNC: 109 MG/DL (ref 120–199)
CHOLEST/HDLC SERPL: 2 {RATIO} (ref 2–5)
CHOLEST/HDLC SERPL: 2 {RATIO} (ref 2–5)
ESTIMATED AVG GLUCOSE: 91 MG/DL (ref 68–131)
HBA1C MFR BLD: 4.8 % (ref 4–5.6)
HDLC SERPL-MCNC: 54 MG/DL (ref 40–75)
HDLC SERPL-MCNC: 54 MG/DL (ref 40–75)
HDLC SERPL: 49.5 % (ref 20–50)
HDLC SERPL: 49.5 % (ref 20–50)
LDLC SERPL CALC-MCNC: 44.8 MG/DL (ref 63–159)
LDLC SERPL CALC-MCNC: 44.8 MG/DL (ref 63–159)
NONHDLC SERPL-MCNC: 55 MG/DL
NONHDLC SERPL-MCNC: 55 MG/DL
TRIGL SERPL-MCNC: 51 MG/DL (ref 30–150)
TRIGL SERPL-MCNC: 51 MG/DL (ref 30–150)

## 2024-06-11 PROCEDURE — 80061 LIPID PANEL: CPT | Mod: HCNC | Performed by: STUDENT IN AN ORGANIZED HEALTH CARE EDUCATION/TRAINING PROGRAM

## 2024-06-11 PROCEDURE — 83036 HEMOGLOBIN GLYCOSYLATED A1C: CPT | Mod: HCNC | Performed by: STUDENT IN AN ORGANIZED HEALTH CARE EDUCATION/TRAINING PROGRAM

## 2024-06-11 PROCEDURE — 36415 COLL VENOUS BLD VENIPUNCTURE: CPT | Mod: HCNC | Performed by: STUDENT IN AN ORGANIZED HEALTH CARE EDUCATION/TRAINING PROGRAM

## 2024-06-18 ENCOUNTER — PATIENT OUTREACH (OUTPATIENT)
Dept: ADMINISTRATIVE | Facility: HOSPITAL | Age: 69
End: 2024-06-18
Payer: MEDICARE

## 2024-06-18 NOTE — PROGRESS NOTES
Health Maintenance Due   Topic Date Due    Colorectal Cancer Screening  03/03/2023    COVID-19 Vaccine (7 - 2023-24 season) 05/05/2024     Triggered LINKS and reconciled immunizations. Updated Care Everywhere. Pt responded to campaigns outreach asking to completed colorectal cancer screening- referral to endo  was previously placed; number given to pt to call to schedule colonoscopy. Chart review completed.

## 2024-06-19 ENCOUNTER — PATIENT MESSAGE (OUTPATIENT)
Dept: NEUROLOGY | Facility: CLINIC | Age: 69
End: 2024-06-19
Payer: MEDICARE

## 2024-07-01 ENCOUNTER — PATIENT MESSAGE (OUTPATIENT)
Dept: INTERNAL MEDICINE | Facility: CLINIC | Age: 69
End: 2024-07-01
Payer: MEDICARE

## 2024-07-02 ENCOUNTER — OFFICE VISIT (OUTPATIENT)
Dept: ORTHOPEDICS | Facility: CLINIC | Age: 69
End: 2024-07-02
Payer: MEDICARE

## 2024-07-02 DIAGNOSIS — M19.011 OSTEOARTHRITIS, LOCALIZED, SHOULDER, RIGHT: ICD-10-CM

## 2024-07-02 DIAGNOSIS — M54.12 CERVICAL RADICULOPATHY: Primary | ICD-10-CM

## 2024-07-02 DIAGNOSIS — M25.521 RIGHT ELBOW PAIN: ICD-10-CM

## 2024-07-02 DIAGNOSIS — Z99.2 ESRD (END STAGE RENAL DISEASE) ON DIALYSIS: Primary | ICD-10-CM

## 2024-07-02 DIAGNOSIS — K21.9 GASTROESOPHAGEAL REFLUX DISEASE, UNSPECIFIED WHETHER ESOPHAGITIS PRESENT: ICD-10-CM

## 2024-07-02 DIAGNOSIS — N18.6 ESRD (END STAGE RENAL DISEASE) ON DIALYSIS: Primary | ICD-10-CM

## 2024-07-02 DIAGNOSIS — M19.021 OSTEOARTHRITIS OF RIGHT ELBOW, UNSPECIFIED OSTEOARTHRITIS TYPE: ICD-10-CM

## 2024-07-02 PROCEDURE — 99999 PR PBB SHADOW E&M-EST. PATIENT-LVL IV: CPT | Mod: PBBFAC,HCNC,, | Performed by: PHYSICIAN ASSISTANT

## 2024-07-02 RX ORDER — TRAMADOL HYDROCHLORIDE 50 MG/1
50 TABLET ORAL EVERY 6 HOURS PRN
Qty: 20 TABLET | Refills: 0 | Status: SHIPPED | OUTPATIENT
Start: 2024-07-02

## 2024-07-02 RX ORDER — FAMOTIDINE 20 MG/1
20 TABLET, FILM COATED ORAL DAILY
Qty: 30 TABLET | Refills: 3 | Status: SHIPPED | OUTPATIENT
Start: 2024-07-02 | End: 2025-07-02

## 2024-07-02 NOTE — PROGRESS NOTES
Patient ID: Ibrahima Phelps is a 68 y.o. male.    Chief Complaint: Pain and Numbness of the Right Elbow, Pain and Numbness of the Right Shoulder, and Pain and Numbness of the Right Arm      HISTORY:  Ibrahima Phelps is a 68 y.o. male who returns to me today for evaluation of right arm pain, numbness and tingling in his hand.  This has been ongoing for a few weeks.  He has long history of right elbow and shoulder pain.  His symptoms seem to be worse when he is sitting using the computer or with his arm hanging down. He does not feel pain is worse with activity or overhead reaching.  The injection in his right elbow did not provide much relief.      PMH/PSH/FamHx/SocHx:    Unchanged from prior visit.    ROS:  Constitution: Negative for chills, fever and weakness.   Respiratory: Negative for cough and shortness of breath.   Musculoskeletal: Positive for right arm pain  Psychiatric/Behavioral: The patient is not nervous/anxious.       PHYSICAL EXAM:   Right arm  Skin intact  No atrophy   No TTP  FROM in shoulder- no pain with ROM  Elbow has painless ROM  5/5 symmetric bilateral strength testing  - impingement  - lugo    ASSESSMENT/PLAN:    Ibrahima was seen today for pain, numbness, pain, numbness, pain and numbness.    Diagnoses and all orders for this visit:    Cervical radiculopathy    Right elbow pain    Osteoarthritis of right elbow, unspecified osteoarthritis type    Osteoarthritis, localized, shoulder, right    Other orders  -     traMADoL (ULTRAM) 50 mg tablet; Take 1 tablet (50 mg total) by mouth every 6 (six) hours as needed for Pain.    - Suspect radiculopathy as main cause of pain right now.  Already taking gabapentin- can increase dosage to twice daily.  CKD on dialysis- can't take NSAIDS and already on prednisone daily.  - Will prescribe tramadol to take if needed for severe pain   - If no improvement over the next few weeks, consider cervical MRI

## 2024-07-03 ENCOUNTER — PATIENT MESSAGE (OUTPATIENT)
Dept: CARDIOLOGY | Facility: CLINIC | Age: 69
End: 2024-07-03
Payer: MEDICARE

## 2024-07-03 ENCOUNTER — PATIENT MESSAGE (OUTPATIENT)
Dept: ELECTROPHYSIOLOGY | Facility: CLINIC | Age: 69
End: 2024-07-03
Payer: MEDICARE

## 2024-07-03 NOTE — TELEPHONE ENCOUNTER
Spoke with Ms Phelps regarding Imdur Rx. The medication is not listed on his current list of medications but she should followup with her 's general Cardiologist.

## 2024-07-11 ENCOUNTER — PATIENT MESSAGE (OUTPATIENT)
Dept: NEPHROLOGY | Facility: CLINIC | Age: 69
End: 2024-07-11
Payer: MEDICARE

## 2024-07-14 NOTE — ASSESSMENT & PLAN NOTE
During care, noted pt diaphoretic, weak and pale. Pt alert, awake and oriented to person and place. Blood glucose checked, low blood sugar result. Pt asymptomatic. No signs of distress. Hypoglycemic protocol initiated. Orange juice, applesauce and julee crackers given and tolerated well. Dextrose injection given with positive effect, BS level increased to 200s.CN and NP notified. New orders received. Pt remain alert and aware, resting in bed.    Ibrahima Phelps is a 67 y.o. male s/p R LF PIP washout 8/30.      - Weight bearing status: WBAT  - Pain control: Per APS  - Antibiotics: Dapto/ rocephin  - OT  - Cx: NGTD  - Dispo: Dressing down tomorrow

## 2024-07-15 PROBLEM — J96.01 ACUTE HYPOXEMIC RESPIRATORY FAILURE: Status: RESOLVED | Noted: 2023-02-28 | Resolved: 2024-07-15

## 2024-07-15 PROBLEM — J81.0 FLASH PULMONARY EDEMA: Status: RESOLVED | Noted: 2024-04-09 | Resolved: 2024-07-15

## 2024-07-19 DIAGNOSIS — I13.0 HYPERTENSIVE HEART AND RENAL DISEASE WITH RENAL FAILURE, STAGE 1 THROUGH STAGE 4 OR UNSPECIFIED CHRONIC KIDNEY DISEASE, WITH HEART FAILURE: ICD-10-CM

## 2024-07-20 NOTE — TELEPHONE ENCOUNTER
No care due was identified.  [facility] Embedded Care Due Messages. Reference number: [id number].   7/19/2024 7:26:37 PM CDT

## 2024-07-23 RX ORDER — DOXAZOSIN 4 MG/1
4 TABLET ORAL EVERY 12 HOURS
Qty: 180 TABLET | Refills: 3 | Status: SHIPPED | OUTPATIENT
Start: 2024-07-23

## 2024-07-30 RX ORDER — APIXABAN 5 MG/1
5 TABLET, FILM COATED ORAL 2 TIMES DAILY
Qty: 60 TABLET | Refills: 32 | Status: SHIPPED | OUTPATIENT
Start: 2024-07-30

## 2024-08-09 ENCOUNTER — TELEPHONE (OUTPATIENT)
Dept: ELECTROPHYSIOLOGY | Facility: CLINIC | Age: 69
End: 2024-08-09
Payer: MEDICARE

## 2024-08-09 RX ORDER — FUROSEMIDE 20 MG/1
80 TABLET ORAL 2 TIMES DAILY PRN
Start: 2024-08-09 | End: 2025-08-09

## 2024-08-12 ENCOUNTER — HOSPITAL ENCOUNTER (EMERGENCY)
Facility: HOSPITAL | Age: 69
Discharge: HOME | End: 2024-08-12
Attending: EMERGENCY MEDICINE
Payer: MEDICARE

## 2024-08-12 ENCOUNTER — PATIENT MESSAGE (OUTPATIENT)
Dept: ELECTROPHYSIOLOGY | Facility: CLINIC | Age: 69
End: 2024-08-12
Payer: MEDICARE

## 2024-08-12 VITALS
HEIGHT: 73 IN | OXYGEN SATURATION: 98 % | DIASTOLIC BLOOD PRESSURE: 76 MMHG | RESPIRATION RATE: 20 BRPM | SYSTOLIC BLOOD PRESSURE: 128 MMHG | BODY MASS INDEX: 23.19 KG/M2 | HEART RATE: 76 BPM | TEMPERATURE: 99 F | WEIGHT: 175 LBS

## 2024-08-12 DIAGNOSIS — R60.9 SWELLING: ICD-10-CM

## 2024-08-12 DIAGNOSIS — N49.2 SCROTAL ABSCESS: Primary | ICD-10-CM

## 2024-08-12 LAB
ALBUMIN SERPL BCP-MCNC: 3.1 G/DL (ref 3.5–5.2)
ALP SERPL-CCNC: 61 U/L (ref 55–135)
ALT SERPL W/O P-5'-P-CCNC: 13 U/L (ref 10–44)
ANION GAP SERPL CALC-SCNC: 10 MMOL/L (ref 8–16)
AST SERPL-CCNC: 8 U/L (ref 10–40)
BASOPHILS # BLD AUTO: 0.01 K/UL (ref 0–0.2)
BASOPHILS NFR BLD: 0.1 % (ref 0–1.9)
BILIRUB SERPL-MCNC: 0.9 MG/DL (ref 0.1–1)
BUN SERPL-MCNC: 40 MG/DL (ref 8–23)
CALCIUM SERPL-MCNC: 8.4 MG/DL (ref 8.7–10.5)
CHLORIDE SERPL-SCNC: 105 MMOL/L (ref 95–110)
CO2 SERPL-SCNC: 22 MMOL/L (ref 23–29)
CREAT SERPL-MCNC: 7 MG/DL (ref 0.5–1.4)
DIFFERENTIAL METHOD BLD: ABNORMAL
EOSINOPHIL # BLD AUTO: 0 K/UL (ref 0–0.5)
EOSINOPHIL NFR BLD: 0.2 % (ref 0–8)
ERYTHROCYTE [DISTWIDTH] IN BLOOD BY AUTOMATED COUNT: 18.7 % (ref 11.5–14.5)
EST. GFR  (NO RACE VARIABLE): 7.9 ML/MIN/1.73 M^2
GLUCOSE SERPL-MCNC: 82 MG/DL (ref 70–110)
HCT VFR BLD AUTO: 39.1 % (ref 40–54)
HGB BLD-MCNC: 12 G/DL (ref 14–18)
IMM GRANULOCYTES # BLD AUTO: 0.08 K/UL (ref 0–0.04)
IMM GRANULOCYTES NFR BLD AUTO: 0.7 % (ref 0–0.5)
LACTATE SERPL-SCNC: 1 MMOL/L (ref 0.5–2.2)
LIPASE SERPL-CCNC: 14 U/L (ref 4–60)
LYMPHOCYTES # BLD AUTO: 0.5 K/UL (ref 1–4.8)
LYMPHOCYTES NFR BLD: 4.4 % (ref 18–48)
MCH RBC QN AUTO: 25.3 PG (ref 27–31)
MCHC RBC AUTO-ENTMCNC: 30.7 G/DL (ref 32–36)
MCV RBC AUTO: 82 FL (ref 82–98)
MONOCYTES # BLD AUTO: 0.8 K/UL (ref 0.3–1)
MONOCYTES NFR BLD: 6.9 % (ref 4–15)
NEUTROPHILS # BLD AUTO: 10.6 K/UL (ref 1.8–7.7)
NEUTROPHILS NFR BLD: 87.7 % (ref 38–73)
NRBC BLD-RTO: 0 /100 WBC
PLATELET # BLD AUTO: 121 K/UL (ref 150–450)
PMV BLD AUTO: ABNORMAL FL (ref 9.2–12.9)
POTASSIUM SERPL-SCNC: 4.4 MMOL/L (ref 3.5–5.1)
PROCALCITONIN SERPL IA-MCNC: 0.3 NG/ML
PROT SERPL-MCNC: 6.5 G/DL (ref 6–8.4)
RBC # BLD AUTO: 4.75 M/UL (ref 4.6–6.2)
SODIUM SERPL-SCNC: 137 MMOL/L (ref 136–145)
WBC # BLD AUTO: 12.13 K/UL (ref 3.9–12.7)

## 2024-08-12 PROCEDURE — 54700 I&D EPDIDYMS TSTIS&/SCROT SP: CPT | Mod: HCNC

## 2024-08-12 PROCEDURE — 25500020 PHARM REV CODE 255: Mod: HCNC | Performed by: EMERGENCY MEDICINE

## 2024-08-12 PROCEDURE — 87075 CULTR BACTERIA EXCEPT BLOOD: CPT | Mod: HCNC,59

## 2024-08-12 PROCEDURE — 80053 COMPREHEN METABOLIC PANEL: CPT | Mod: HCNC | Performed by: EMERGENCY MEDICINE

## 2024-08-12 PROCEDURE — 87040 BLOOD CULTURE FOR BACTERIA: CPT | Mod: 59,HCNC | Performed by: EMERGENCY MEDICINE

## 2024-08-12 PROCEDURE — 25000003 PHARM REV CODE 250: Mod: HCNC

## 2024-08-12 PROCEDURE — 25000003 PHARM REV CODE 250: Mod: HCNC | Performed by: EMERGENCY MEDICINE

## 2024-08-12 PROCEDURE — 83605 ASSAY OF LACTIC ACID: CPT | Mod: HCNC | Performed by: EMERGENCY MEDICINE

## 2024-08-12 PROCEDURE — 87070 CULTURE OTHR SPECIMN AEROBIC: CPT | Mod: HCNC

## 2024-08-12 PROCEDURE — 96367 TX/PROPH/DG ADDL SEQ IV INF: CPT | Mod: HCNC,59

## 2024-08-12 PROCEDURE — 96365 THER/PROPH/DIAG IV INF INIT: CPT | Mod: HCNC

## 2024-08-12 PROCEDURE — 96366 THER/PROPH/DIAG IV INF ADDON: CPT | Mod: HCNC

## 2024-08-12 PROCEDURE — 84145 PROCALCITONIN (PCT): CPT | Mod: HCNC | Performed by: EMERGENCY MEDICINE

## 2024-08-12 PROCEDURE — 99285 EMERGENCY DEPT VISIT HI MDM: CPT | Mod: 25,HCNC

## 2024-08-12 PROCEDURE — 63600175 PHARM REV CODE 636 W HCPCS: Mod: HCNC | Performed by: EMERGENCY MEDICINE

## 2024-08-12 PROCEDURE — 96375 TX/PRO/DX INJ NEW DRUG ADDON: CPT | Mod: HCNC

## 2024-08-12 PROCEDURE — 96376 TX/PRO/DX INJ SAME DRUG ADON: CPT | Mod: HCNC

## 2024-08-12 PROCEDURE — 85025 COMPLETE CBC W/AUTO DIFF WBC: CPT | Mod: HCNC | Performed by: EMERGENCY MEDICINE

## 2024-08-12 PROCEDURE — 87076 CULTURE ANAEROBE IDENT EACH: CPT | Mod: HCNC

## 2024-08-12 PROCEDURE — 83690 ASSAY OF LIPASE: CPT | Mod: HCNC | Performed by: EMERGENCY MEDICINE

## 2024-08-12 RX ORDER — LIDOCAINE HYDROCHLORIDE AND EPINEPHRINE 5; 5 MG/ML; UG/ML
10 INJECTION, SOLUTION INFILTRATION; PERINEURAL ONCE
Status: DISCONTINUED | OUTPATIENT
Start: 2024-08-12 | End: 2024-08-12 | Stop reason: HOSPADM

## 2024-08-12 RX ORDER — CEPHALEXIN 500 MG/1
500 CAPSULE ORAL EVERY 12 HOURS
Qty: 28 CAPSULE | Refills: 0 | Status: SHIPPED | OUTPATIENT
Start: 2024-08-12 | End: 2024-08-26

## 2024-08-12 RX ORDER — MORPHINE SULFATE 4 MG/ML
4 INJECTION, SOLUTION INTRAMUSCULAR; INTRAVENOUS
Status: COMPLETED | OUTPATIENT
Start: 2024-08-12 | End: 2024-08-12

## 2024-08-12 RX ORDER — LIDOCAINE HYDROCHLORIDE AND EPINEPHRINE 10; 10 MG/ML; UG/ML
10 INJECTION, SOLUTION INFILTRATION; PERINEURAL ONCE
Status: COMPLETED | OUTPATIENT
Start: 2024-08-12 | End: 2024-08-12

## 2024-08-12 RX ORDER — OXYCODONE AND ACETAMINOPHEN 5; 325 MG/1; MG/1
1 TABLET ORAL EVERY 6 HOURS PRN
Qty: 12 TABLET | Refills: 0 | Status: SHIPPED | OUTPATIENT
Start: 2024-08-12

## 2024-08-12 RX ADMIN — MORPHINE SULFATE 4 MG: 4 INJECTION INTRAVENOUS at 02:08

## 2024-08-12 RX ADMIN — PIPERACILLIN SODIUM AND TAZOBACTAM SODIUM 4.5 G: 4; .5 INJECTION, POWDER, FOR SOLUTION INTRAVENOUS at 03:08

## 2024-08-12 RX ADMIN — LIDOCAINE HYDROCHLORIDE,EPINEPHRINE BITARTRATE 10 ML: 10; .01 INJECTION, SOLUTION INFILTRATION; PERINEURAL at 06:08

## 2024-08-12 RX ADMIN — VANCOMYCIN HYDROCHLORIDE 1500 MG: 1.5 INJECTION, POWDER, LYOPHILIZED, FOR SOLUTION INTRAVENOUS at 04:08

## 2024-08-12 RX ADMIN — IOHEXOL 75 ML: 350 INJECTION, SOLUTION INTRAVENOUS at 03:08

## 2024-08-12 RX ADMIN — MORPHINE SULFATE 4 MG: 4 INJECTION INTRAVENOUS at 04:08

## 2024-08-12 NOTE — ED NOTES
Bed: [facility]  Expected date: 8/12/24  Expected time:   Means of arrival:   Comments:  Intake 4

## 2024-08-12 NOTE — ED PROVIDER NOTES
Encounter Date: 8/12/2024       History     Chief Complaint   Patient presents with    Abscess     Behind scrotal area, no drainage     HPI  Ago 68-year-old male who presents with scrotal and perineal pain.  A few days ago he noticed a small lump there which has grown in size.  No trauma.  No drainage.  Denies any fevers or chills.  No nausea or vomiting.  No abdominal pain.  He reports severe pain in his scrotal and perineal area.  He was having normal bowel movements, not constipated and no blood or purulent fluid in his stool.  He is a dialysis patient, goes Tuesday/Thursday/Saturday, but does still make a small amount of urine.  Last got dialysis on Saturday.  He states that he does have urinary urgency, tries to go but only a few drops come out.  No hematuria or dysuria.  Has never had an abscess in this area before.    A past medical history includes coronary artery disease, CHF, CKD, hypertension and hyperlipidemia, kidney transplant in 1992 and 2005 currently still on prednisone and tacrolimus.      Review of patient's allergies indicates:   Allergen Reactions    Ace inhibitors Swelling    Verapamil Other (See Comments)     Other reaction(s): Unknown    Povidone-iodine Itching     Past Medical History:   Diagnosis Date    Atrial fibrillation     CAD (coronary artery disease) 2006    MI in 2006    CHF (congestive heart failure) 2017    CKD (chronic kidney disease) stage 3, GFR 30-59 ml/min     Dr. Latif.  in transplanted kidney    CKD (chronic kidney disease) stage 5, GFR less than 15 ml/min 02/02/2024    COVID-19 02/07/2023    Diverticulosis     Hyperlipidemia     Hypertension     Keloid cicatrix     Metabolic bone disease     Other emphysema 10/01/2019    Pericarditis     S/P kidney transplant 1992    x2 (1992 & 2005),    Thrombocytopenia     Thyroid disease     Tobacco use 09/05/2014     Past Surgical History:   Procedure Laterality Date    AV FISTULA PLACEMENT Left 12/18/2023    Procedure: CREATION, AV  "FISTULA;  Surgeon: JUANI Melendez III, MD;  Location: [facility] OR 2ND FLR;  Service: Vascular;  Laterality: Left;  LUE AVF creation vs AVG insertion    CARDIOVERSION  04/30/15    CHOLECYSTECTOMY      COLONOSCOPY  April 20, 2011    Diverticulosis, repeat recommended in 3 yrs., repeat colonoscopy 2014 revealed 2 polyps.  He should return in 5 years.    COLONOSCOPY N/A 3/13/2020    Procedure: COLONOSCOPY;  Surgeon: Chon Casper MD;  Location: [facility] ENDO (4TH FLR);  Service: Endoscopy;  Laterality: N/A;  ok to hold coumadin x5days-see telephone encounter 2/4/20-tb    COLONOSCOPY N/A 2/4/2021    Procedure: COLONOSCOPY;  Surgeon: Chon Casper MD;  Location: [facility] (4TH FLR);  Service: Endoscopy;  Laterality: N/A;  Eliquis - per Dr. GAVIN chua to hold x 2 days and "restarted asap"- ERW  last prep "poor", [id number] ordered/ Pt requests Dr. Casper  prep ins. mailed - COVID screening on 2/1/21 PCW- ERW    COLONOSCOPY N/A 3/3/2021    Procedure: COLONOSCOPY;  Surgeon: Chon Casper MD;  Location: [facility] (4TH FLR);  Service: Endoscopy;  Laterality: N/A;  Patient with his second poor bowel pre and has poor prep today.  If patient not intersted or can't get colonoscopy tomorrow will need constipation bowel prep and will need to restart his Eliquis today.Thanks,Chon     per Dr Blunt-ok to hold Eliquis 2 days prior      COVID     CORONARY ANGIOGRAPHY N/A 2/28/2024    Procedure: ANGIOGRAM, CORONARY ARTERY;  Surgeon: Tylor Lincoln MD;  Location: [facility] CATH LAB;  Service: Cardiology;  Laterality: N/A;    CORONARY ANGIOPLASTY WITH STENT PLACEMENT  9/13/2006    FISTULOGRAM N/A 2/19/2024    Procedure: Fistulogram;  Surgeon: JUANI Melendez III, MD;  Location: [facility] CATH LAB;  Service: Peripheral Vascular;  Laterality: N/A;    FISTULOGRAM, WITH PTA Left 6/3/2024    Procedure: FISTULOGRAM, WITH PTA;  Surgeon: JUANI Melendez III, MD;  Location: [facility] OR 2ND FLR;  Service: Vascular;  Laterality: Left;  mGy: " 51.58  GyCm2: 6.8285  Fluoro time: 5.1 min  Contrast: 28 cc    IRRIGATION AND DEBRIDEMENT Right 8/30/2023    Procedure: IRRIGATION AND DEBRIDEMENT, RIGHT MIDDLE FINGER;  Surgeon: Martin Larsen MD;  Location: [facility] OR [address] [place] [address];  Service: Orthopedics;  Laterality: Right;    KIDNEY TRANSPLANT  2005    LEFT HEART CATHETERIZATION N/A 2/26/2024    Procedure: Left heart cath;  Surgeon: Tylor Lincoln MD;  Location: [facility] CATH LAB;  Service: Cardiology;  Laterality: N/A;    PARATHYROIDECTOMY      PERCUTANEOUS TRANSLUMINAL ANGIOPLASTY OF ARTERIOVENOUS FISTULA Left 2/19/2024    Procedure: PTA, AV FISTULA;  Surgeon: JUANI Melendez III, MD;  Location: [facility] CATH LAB;  Service: Peripheral Vascular;  Laterality: Left;    STENT, DRUG ELUTING, SINGLE VESSEL, CORONARY N/A 2/28/2024    Procedure: Stent, Drug Eluting, Single Vessel, Coronary;  Surgeon: Tylor Lincoln MD;  Location: [facility] CATH LAB;  Service: Cardiology;  Laterality: N/A;    TREATMENT OF CARDIAC ARRHYTHMIA N/A 3/28/2022    Procedure: CARDIOVERSION;  Surgeon: Migue Blunt MD;  Location: [facility] EP LAB;  Service: Cardiology;  Laterality: N/A;  AF, DCC, MAC, GP, 3 PREP*RODRIGO deferred pt 100% compliant*    VENOPLASTY  6/3/2024    Procedure: ANGIOPLASTY, VEIN;  Surgeon: JUANI Melendez III, MD;  Location: [facility] OR [address] [place] [address];  Service: Vascular;;  Left subclavian     Family History   Problem Relation Name Age of Onset    No Known Problems Sister      No Known Problems Brother      Thyroid disease Mother          s/p surgery    Heart disease Father          had pacemaker    No Known Problems Sister      Kidney failure Sister      Kidney disease Sister      No Known Problems Sister      Kidney disease Brother      Kidney failure Brother          s/p transplant    Diabetes Mellitus Neg Hx      Stroke Neg Hx      Heart attack Neg Hx      Cancer Neg Hx      Celiac disease Neg Hx      Cirrhosis Neg Hx      Colon cancer Neg Hx      Esophageal cancer Neg Hx       Inflammatory bowel disease Neg Hx      Rectal cancer Neg Hx      Stomach cancer Neg Hx      Ulcerative colitis Neg Hx      Liver disease Neg Hx      Liver cancer Neg Hx      Crohn's disease Neg Hx      Melanoma Neg Hx       Social History     Tobacco Use    Smoking status: Every Day     Current packs/day: 0.00     Average packs/day: 0.5 packs/day for 40.0 years (20.0 ttl pk-yrs)     Types: Cigarettes     Start date: 1982     Last attempt to quit: 2022     Years since quittin.4    Smokeless tobacco: Never    Tobacco comments:     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     Patient is currently retired   Substance Use Topics    Alcohol use: No    Drug use: No     Review of Systems    Physical Exam     Initial Vitals [24 1054]   BP Pulse Resp Temp SpO2   137/62 77 20 98.6 °F (37 °C) 100 %      MAP       --         Physical Exam    Nursing note and vitals reviewed.  Constitutional: He appears well-developed and well-nourished.   Appears very uncomfortable, lying in 1 position in the bed   HENT:   Head: Normocephalic and atraumatic.   Nose: Nose normal.   Eyes: Conjunctivae and EOM are normal. Pupils are equal, round, and reactive to light.   Neck:   Normal range of motion.  Cardiovascular:  Normal rate, regular rhythm and normal heart sounds.     Exam reveals no gallop and no friction rub.       No murmur heard.  Pulmonary/Chest: Breath sounds normal. No respiratory distress. He has no wheezes. He has no rhonchi. He has no rales.   Abdominal: Abdomen is soft. He exhibits no distension. There is abdominal tenderness.   Mild suprapubic tenderness There is no rebound and no guarding.   Genitourinary:    Genitourinary Comments: Along the scrotal area on the right side there is a tiny abscess.  However on the left closer to the paramedian there was a very large firm tender area.  No crepitus, erythema.  Normal penis.     Musculoskeletal:         General: No  tenderness or edema. Normal range of motion.      Cervical back: Normal range of motion.     Neurological: He is alert and oriented to person, place, and time.   Skin: Skin is warm and dry.   Psychiatric: He has a normal mood and affect.         ED Course   Procedures  Labs Reviewed   CBC W/ AUTO DIFFERENTIAL - Abnormal       Result Value    WBC 12.13      RBC 4.75      Hemoglobin 12.0 (*)     Hematocrit 39.1 (*)     MCV 82      MCH 25.3 (*)     MCHC 30.7 (*)     RDW 18.7 (*)     Platelets 121 (*)     MPV SEE COMMENT      Immature Granulocytes 0.7 (*)     Gran # (ANC) 10.6 (*)     Immature Grans (Abs) 0.08 (*)     Lymph # 0.5 (*)     Mono # 0.8      Eos # 0.0      Baso # 0.01      nRBC 0      Gran % 87.7 (*)     Lymph % 4.4 (*)     Mono % 6.9      Eosinophil % 0.2      Basophil % 0.1      Differential Method Automated     COMPREHENSIVE METABOLIC PANEL - Abnormal    Sodium 137      Potassium 4.4      Chloride 105      CO2 22 (*)     Glucose 82      BUN 40 (*)     Creatinine 7.0 (*)     Calcium 8.4 (*)     Total Protein 6.5      Albumin 3.1 (*)     Total Bilirubin 0.9      Alkaline Phosphatase 61      AST 8 (*)     ALT 13      eGFR 7.9 (*)     Anion Gap 10     PROCALCITONIN - Abnormal    Procalcitonin 0.30 (*)    CULTURE, BLOOD   CULTURE, BLOOD   LIPASE    Lipase 14     LACTIC ACID, PLASMA    Lactate (Lactic Acid) 1.0     URINALYSIS, REFLEX TO URINE CULTURE          Imaging Results              CT Abdomen Pelvis With IV Contrast NO Oral Contrast (Final result)  Result time 08/12/24 16:31:55      Final result by Kalia Sanders MD (08/12/24 16:31:55)                   Impression:      1. Remote cholecystectomy.  Interval increased dilatation of the common bile duct with slight intrahepatic biliary ductal dilatation as well.  This could reflect progression of post cholecystectomy reservoir phenomenon or mild mass effect upon the distal most CBD near the ampulla from a duodenal diverticulum.  No pancreatic head mass  identified.  Clinical correlation and with LFTs advised.  Further evaluation with MRCP can be obtained as warranted.  2. Extensive colonic diverticulosis without diverticulitis.  3. Atrophic bilateral native kidneys with grossly stable bilateral renal suspected simple and hyperdense cysts.  Further characterization with elective/nonemergent renal ultrasound can be obtained to confirm cystic components as warranted.  4. Grossly stable calcifications of the bilateral native kidneys, some of which may reflect nonobstructing nephroliths.  5. Left lower quadrant renal transplant previous inflammatory stranding within the left lower quadrant has nearly completely resolved, now with minimal stranding about the proximal portion of the transplant ureter.  No hydronephrosis.  6. Prostatomegaly.  7. Interval improved aeration of the lung bases with decreased but not yet resolved suspected trace right pleural effusion.  No consolidative process.  8. Grossly stable additional chronic/incidental findings in the body of the report.      Electronically signed by: Kalia Sanders MD  Date:    08/12/2024  Time:    16:31               Narrative:    EXAMINATION:  CT ABDOMEN PELVIS WITH IV CONTRAST    CLINICAL HISTORY:  Abdominal abscess/infection suspected;    TECHNIQUE:  Low dose axial images, sagittal and coronal reformations were obtained from the lung bases to the pubic symphysis following the IV administration of 75 mL of Omnipaque 350 .  Oral contrast was not given.    COMPARISON:  CT abdomen and pelvis 10/13/2023, transplant ultrasound 08/30/2023, MRI MRCP 11/25/2014    FINDINGS:  Imaged lung bases show mild dependent atelectasis with scattered areas of platelike scarring versus atelectasis and centrilobular emphysematous changes slightly improved from prior.  Trace layering right pleural effusion decreased in volume from prior.  No consolidation.  No left pleural effusion.    Base of the heart is enlarged without significant  pericardial fluid similar to prior, noting multi-vessel coronary arterial calcifications.    Small hiatal hernia.  Stomach is grossly within normal limits.  Grossly stable small duodenal diverticulum.  Duodenum is otherwise within normal limits.    Gallbladder not identified and likely surgically absent.  Dilated common bile duct measuring up to 12-13 mm proximally with slight intrahepatic biliary ductal dilatation, appears increased in caliber from most recent prior.    Liver is enlarged similar to prior.  Grossly stable few scattered subcentimeter hypoattenuating hepatic parenchymal foci which are too small to characterize.    Pancreas is mildly atrophic.  Spleen and bilateral adrenal glands are within normal limits.    Bilateral native kidneys are markedly atrophic similar to prior.  Redemonstrated numerous bilateral renal hypodense 2 intermediate attenuation parenchymal and exophytic lesions, most likely representing simple and hyperdense renal cysts.  Grossly stable distribution of bilateral renal vascular calcifications, parenchymal calcifications and suggested nonobstructing nephroliths.  No hydronephrosis of the native kidneys.  Native ureters are normal in course and caliber.  Urinary bladder well distended without wall thickening.  Prostate measures 5.5 cm in transaxial dimension.  Pelvic phleboliths noted.    Left lower quadrant renal transplant stable in overall size, shape and configuration with normal enhancement.  Previous inflammatory change within the left lower quadrant has nearly completely resolved now with minimal stranding about the proximal portion of the transplant ureter.  No hydronephrosis or significant stranding associated with the renal transplant.  Renal transplant ureter is normal in caliber.    Extensive numerous diffuse colonic diverticula without convincing evidence of acute diverticulitis.  Fatty hypertrophy of the ileocecal valve.  Appendix and terminal ileum are within normal  limits.  No evidence of bowel obstruction or convincing evidence for acute bowel inflammation.  No pneumatosis or portal venous gas.    No ascites or free air.  Scattered moderate to advanced mostly calcific atherosclerosis of the abdominal aorta extending into its visceral and iliac branches.  No aortic aneurysm or dissection.    Grossly stable multiple scattered prominent left periaortic retroperitoneal lymph nodes and also along the left common iliac chain.  No convincing evidence for new or enlarging lymphadenopathy by CT criteria from prior.    Extraperitoneal soft tissues and osseous structures appear grossly stable without acute findings.                                       Medications   vancomycin 1,500 mg in D5W 250 mL IVPB (Vial-Mate) (1,500 mg Intravenous New Bag 8/12/24 1615)   morphine injection 4 mg (4 mg Intravenous Given 8/12/24 1434)   piperacillin-tazobactam (ZOSYN) 4.5 g in D5W 100 mL IVPB (MB+) (0 g Intravenous Stopped 8/12/24 1558)   iohexoL (OMNIPAQUE 350) injection 75 mL (75 mLs Intravenous Given 8/12/24 1550)   morphine injection 4 mg (4 mg Intravenous Given 8/12/24 1609)     Medical Decision Making  68-year-old male who presents with pain around his perineum.  Started as a small bump and now he has a larger area.  No systemic symptoms.  On exam he appears very uncomfortable.  He was very tender over a large firm area around the base of the scrotum and perineum.  No crepitus.  I am concerned for an abscess versus developing Era's.  He was also immunocompromised.  I have put in sepsis workup, CT abdomen/pelvis, and vancomycin and Zosyn to cover necrotizing infection.  Likely we will need a consult to surgery or urology but pending imaging.    Labs overall reassuring/at baseline.  CT did not get far enough to see the abscess.  We will order a scrotal ultrasound.  No signs of free air in the area.  I did speak with Urology just because clinically, with an abscess in that area, they will  need to be involved for likely I and D.  Signed out to Dr. Cordova pending full workup and recommendations by urology    Amount and/or Complexity of Data Reviewed  Labs: ordered.  Radiology: ordered.    Risk  Prescription drug management.                                      Clinical Impression:  Final diagnoses:  [R60.9] Swelling  [N49.2] Scrotal abscess (Primary)                 Tierney Patrick MD  08/12/24 [id number]

## 2024-08-12 NOTE — ED NOTES
Stuck pt x 2; will need US for access.    Subjective   Patient ID: Trenton is a 19 year old male.    Chief Complaint   Patient presents with   • Worker's Compensation     wound check, injury 2 days ago     Hand Injury   The incident occurred 2 days ago. The incident occurred at work. The injury mechanism was a direct blow. The pain is present in the right hand. The pain does not radiate. The pain is at a severity of 1/10. The patient is experiencing no pain. Associated symptoms comments: Feels good feels able to go back to work.. Nothing aggravates the symptoms. He has tried nothing for the symptoms. The treatment provided significant relief.         MEDICATIONS:  No current outpatient medications on file.     No current facility-administered medications for this visit.     Facility-Administered Medications Ordered in Other Visits   Medication   • lidocaine HCl (XYLOCAINE) 1 % injection 50 mg   • bacitracin ointment 1 each         ALLERGIES:  ALLERGIES:  No Known Allergies      MEDICAL HISTORY:  Past Medical History:   Diagnosis Date   • No known problems          PAST SURGICAL HISTORY:  Past Surgical History:   Procedure Laterality Date   • No past surgeries           FAMILY HISTORY:  Family History   Problem Relation Age of Onset   • Patient is unaware of any medical problems Mother    • Patient is unaware of any medical problems Father        FH Reviewed and negative for any heart or lung diseased, bleeding disorders, or issues related to this complaint.    SOCIAL HISTORY:  Social History     Tobacco Use   • Smoking status: Never Smoker   • Smokeless tobacco: Never Used   Substance Use Topics   • Alcohol use: Not Currently   • Drug use: Never         Patient's medications, allergies, past medical, surgical, and social history  were reviewed and updated as appropriate.    Review of Systems   Constitutional: Negative.    HENT: Negative.    Eyes: Negative.    Respiratory: Negative.    Cardiovascular: Negative.    Gastrointestinal: Negative.    Genitourinary:  Negative.    Musculoskeletal: Negative.    Skin: Negative.    Neurological: Negative.    Psychiatric/Behavioral: Negative.        All other systems reviewed and otherwise negative unless noted.     Objective     Physical Exam  Vitals and nursing note reviewed.   Constitutional:       Appearance: He is well-developed.   HENT:      Head: Normocephalic and atraumatic.   Eyes:      Pupils: Pupils are equal, round, and reactive to light.   Neck:      Trachea: No tracheal deviation.   Cardiovascular:      Rate and Rhythm: Normal rate and regular rhythm.   Pulmonary:      Effort: Pulmonary effort is normal.      Breath sounds: Normal breath sounds. No stridor.   Abdominal:      Palpations: Abdomen is soft.      Tenderness: There is no abdominal tenderness. There is no rebound.   Musculoskeletal:      Cervical back: Normal range of motion and neck supple.      Comments: Right hand index finger around the MCP joint he has a single suture holding a small laceration closed.  No signs of infection full active range of motion feels able to return to work.   Skin:     General: Skin is warm and dry.   Neurological:      Mental Status: He is alert and oriented to person, place, and time.   Psychiatric:         Mood and Affect: Mood normal.         Visit Vitals  BP (!) 146/70   Pulse 69   Temp 98.5 °F (36.9 °C)   Resp 14   Ht 5' 6\" (1.676 m)   Wt 63.5 kg (140 lb)   SpO2 100%   BMI 22.60 kg/m²       No results found for this visit on 05/13/21.    [unfilled]    Assessment     Problem List Items Addressed This Visit     None      Visit Diagnoses     Laceration of right index finger without foreign body without damage to nail, subsequent encounter    -  Primary          This is a 19 year old year-old male who presents with doing a right hand laceration which will allow him to go back to work.  Will return in 10 to 12 days for suture removal.  Follow-up Information    None       (S69.462C) Laceration of right index finger without  foreign body without damage to nail, subsequent encounter  (primary encounter diagnosis)    New Prescriptions    No medications on file     Patient Instructions     Return to work without restrictions.  Keep the wound clean and dry.  Can return in 10 to 12 days for suture removal.  Your blood pressure was noted to be elevated today.  Be sure to get some reevaluation to make sure your blood pressure is under control with your primary care physician      Patient Education     Wound Care  Taking proper care of your wound will help it heal. Your healthcare provider may show you how to clean and dress the wound. He or she will also explain how to tell if the wound is healing normally. If you are unsure of how to take care of the wound, be sure to clarify what dressing to use and how often you should change the bandages. Here are the basic steps.     A wound that's not healing normally may be dark in color or have white streaks.   Wash your hands  Tips for washing your hands include:  · Use liquid soap and lather for 2 minutes. Scrub between your fingers and under your nails.  · Rinse with warm water, keeping your fingers pointing down.  · Use a paper towel to dry your hands and to turn off the faucet.  Remove the used dressing  Here are suggestions for removing the dressing:  · If dressing changes cause you pain, be sure to take your pain medicine as prescribed by your healthcare provider 30 minutes before dressing changes.  · Set up your supplies.  · Put on disposable gloves if you’re dressing a wound for someone else or your wound is infected.  · Loosen the tape by pulling gently toward the wound.  · Gently take off the old dressing. If the dressing is stuck to the wound, moisten it with saline (if available) or clean water.  · If you have a drain or tube in the wound, be careful not to pull on it.  · Remove the dressing 1 layer at a time and put it in a plastic bag. Seal the bag and put it in the trash.  · Remove your  gloves.  Inspect and dress the wound  Check the wound carefully:  · Each time you change the dressing, check the wound carefully to be sure it’s healing normally by making sure your wound appears to be pink and moist, and is free of infection.    · Wash your hands again. Put on a new pair of gloves.  · Clean and dress the wound as directed by your healthcare provider or nurse. Don't put anything in the wound that is not prescribed or directed by your healthcare provider. If you have a drain or tube, be careful not to pull on it. Make sure to secure the drain or tube as well.  · Put all unused supplies in a clean plastic bag. Seal the bag and store it in a clean, dry area between dressing changes.   · Be sure to wash your hands again.  Call your healthcare provider  Call your healthcare provider if you see any of the following signs of a problem:  · Bleeding that soaks the dressing  · Pink fluid weeping from the wound  · Increased drainage or drainage that is yellow, yellow-green, or foul-smelling  · Increased swelling or pain, or redness or swelling in the skin around the wound  · A change in the color of the wound, or if streaks develop in a direction away from the wound  · The area between any stitches opens up  · An increase in the size of the wound  · A fever of 100.4°F (38°C) or higher, or as directed by your healthcare provider  · Chills, increased fatigue, or a loss of appetite      Date Last Reviewed: 7/1/2017 © 2000-2018 [employer]. [address] [place] [address], [place] [address]. All rights reserved. This information is not intended as a substitute for professional medical care. Always follow your healthcare professional's instructions.             Instructions provided as documented in the AVS.    Thank you for visiting [facility].

## 2024-08-13 ENCOUNTER — PATIENT MESSAGE (OUTPATIENT)
Dept: NEPHROLOGY | Facility: CLINIC | Age: 69
End: 2024-08-13
Payer: MEDICARE

## 2024-08-13 NOTE — HPI
68-year-old male with history of ESRD on dialysis (s/p renal transplant x2), NSTEMI s/p PCI on elliquis and plavix,  presents to the ED with worsening scrotal pain and swelling.  Urology consulted for scrotal abscess.    Patient reports worsening scrotal pain and swelling since this past Saturday.  He denies fevers, chills..  No other associated symptoms.    Denies hematuria, dysuria, nausea, vomiting, flank pain.  He does still make urine and reports nocturia with occasional urinary urgency.  Denies a prior history of scrotal abscess or infection.  He is on prednisone and tacrolimus.      In the ED, he is AF VSS.  No leukocytosis.  Lactate WNL.  CTAP obtained in the ED shows bilateral atrophic kidneys without hydronephrosis bilaterally.  CT does not fully visualize the superior scrotum.      Received vanc and zosyn in the ED.  Blood cx in process.

## 2024-08-13 NOTE — CONSULTS
[facility] - Emergency Dept  Urology  Consult Note    Patient Name: Ibrahima Phelps  MRN: [id number]  Admission Date: 8/12/2024  Hospital Length of Stay: 0   Code Status: Prior   Attending Provider: Tierney Patrick MD   Consulting Provider: Danielle Ryan MD  Primary Care Physician: Anatoliy Colindres MD  Principal Problem:<principal problem not specified>    Inpatient consult to Urology  Consult performed by: Danielle Ryan MD  Consult ordered by: Tierney Patrick MD  Reason for consult: scrotal abscess          Subjective:     HPI:  68-year-old male with history of ESRD on dialysis (s/p renal transplant x2), NSTEMI s/p PCI on elliquis and plavix,  presents to the ED with worsening scrotal pain and swelling.  Urology consulted for scrotal abscess.    Patient reports worsening scrotal pain and swelling since this past Saturday.  He denies fevers, chills..  No other associated symptoms.    Denies hematuria, dysuria, nausea, vomiting, flank pain.  He does still make urine and reports nocturia with occasional urinary urgency.  Denies a prior history of scrotal abscess or infection.  He is on prednisone and tacrolimus.      In the ED, he is AF VSS.  No leukocytosis.  Lactate WNL.  CTAP obtained in the ED shows bilateral atrophic kidneys without hydronephrosis bilaterally.  CT does not fully visualize the superior scrotum.      Received vanc and zosyn in the ED.  Blood cx in process.        Past Medical History:   Diagnosis Date    Atrial fibrillation     CAD (coronary artery disease) 2006    MI in 2006    CHF (congestive heart failure) 2017    CKD (chronic kidney disease) stage 3, GFR 30-59 ml/min     Dr. Ltaif.  in transplanted kidney    CKD (chronic kidney disease) stage 5, GFR less than 15 ml/min 02/02/2024    COVID-19 02/07/2023    Diverticulosis     Hyperlipidemia     Hypertension     Keloid cicatrix     Metabolic bone disease     Other emphysema 10/01/2019    Pericarditis     S/P kidney transplant 1992    x2 (1992 & 2005),     "Thrombocytopenia     Thyroid disease     Tobacco use 09/05/2014       Past Surgical History:   Procedure Laterality Date    AV FISTULA PLACEMENT Left 12/18/2023    Procedure: CREATION, AV FISTULA;  Surgeon: JUANI Melendez III, MD;  Location: [facility] OR [facility] FLR;  Service: Vascular;  Laterality: Left;  LUE AVF creation vs AVG insertion    CARDIOVERSION  04/30/15    CHOLECYSTECTOMY      COLONOSCOPY  April 20, 2011    Diverticulosis, repeat recommended in 3 yrs., repeat colonoscopy 2014 revealed 2 polyps.  He should return in 5 years.    COLONOSCOPY N/A 3/13/2020    Procedure: COLONOSCOPY;  Surgeon: Chon Casper MD;  Location: [facility] ENDO (4TH FLR);  Service: Endoscopy;  Laterality: N/A;  ok to hold coumadin x5days-see telephone encounter 2/4/20-tb    COLONOSCOPY N/A 2/4/2021    Procedure: COLONOSCOPY;  Surgeon: Chon Casper MD;  Location: [facility] ENDO (4TH FLR);  Service: Endoscopy;  Laterality: N/A;  Eliquis - per Dr. GAVIN Blunt ok to hold x 2 days and "restarted asap"- ERW  last prep "poor", [id number] ordered/ Pt requests Dr. Casper  prep ins. mailed - COVID screening on 2/1/21 PCW- ERW    COLONOSCOPY N/A 3/3/2021    Procedure: COLONOSCOPY;  Surgeon: Chon Casper MD;  Location: [facility] ENDO (4TH FLR);  Service: Endoscopy;  Laterality: N/A;  Patient with his second poor bowel pre and has poor prep today.  If patient not intersted or can't get colonoscopy tomorrow will need constipation bowel prep and will need to restart his Eliquis today.Thanks,Chon Blunt-ok to hold Eliquis 2 days prior      COVID     CORONARY ANGIOGRAPHY N/A 2/28/2024    Procedure: ANGIOGRAM, CORONARY ARTERY;  Surgeon: Tylor Lincoln MD;  Location: [facility] CATH LAB;  Service: Cardiology;  Laterality: N/A;    CORONARY ANGIOPLASTY WITH STENT PLACEMENT  9/13/2006    FISTULOGRAM N/A 2/19/2024    Procedure: Fistulogram;  Surgeon: JUANI Melendez III, MD;  Location: [facility] CATH LAB;  Service: Peripheral Vascular;  Laterality: N/A;    " FISTULOGRAM, WITH PTA Left 6/3/2024    Procedure: FISTULOGRAM, WITH PTA;  Surgeon: JUANI Melendez III, MD;  [facility];  Service: Vascular;  Laterality: Left;  mGy: 51.58  GyCm2: 6.8285  Fluoro time: 5.1 min  Contrast: 28 cc    IRRIGATION AND DEBRIDEMENT Right 8/30/2023    Procedure: IRRIGATION AND DEBRIDEMENT, RIGHT MIDDLE FINGER;  Surgeon: Martin Larsen MD;  [address]  Service: Orthopedics;  Laterality: Right;    KIDNEY TRANSPLANT  2005    LEFT HEART CATHETERIZATION N/A 2/26/2024    Procedure: Left heart cath;  Surgeon: Tylor Lincoln MD;  Location: [facility] CATH LAB;  Service: Cardiology;  Laterality: N/A;    PARATHYROIDECTOMY      PERCUTANEOUS TRANSLUMINAL ANGIOPLASTY OF ARTERIOVENOUS FISTULA Left 2/19/2024    Procedure: PTA, AV FISTULA;  Surgeon: JUANI Melendez III, MD;  Location: [facility] CATH LAB;  Service: Peripheral Vascular;  Laterality: Left;    STENT, DRUG ELUTING, SINGLE VESSEL, CORONARY N/A 2/28/2024    Procedure: Stent, Drug Eluting, Single Vessel, Coronary;  Surgeon: Tylor Lincoln MD;  Location: [facility] CATH LAB;  Service: Cardiology;  Laterality: N/A;    TREATMENT OF CARDIAC ARRHYTHMIA N/A 3/28/2022    Procedure: CARDIOVERSION;  Surgeon: Migue Blunt MD;  Location: [facility] EP LAB;  Service: Cardiology;  Laterality: N/A;  AF, DCC, MAC, GP, 3 PREP*RODRIGO deferred pt 100% compliant*    VENOPLASTY  6/3/2024    Procedure: ANGIOPLASTY, VEIN;  Surgeon: JUANI Melendez III, MD;  [facility];  Service: Vascular;;  Left subclavian       Review of patient's allergies indicates:   Allergen Reactions    Ace inhibitors Swelling    Verapamil Other (See Comments)     Other reaction(s): Unknown    Povidone-iodine Itching       Family History       Problem Relation (Age of Onset)    Heart disease Father    Kidney disease Sister, Brother    Kidney failure Sister, Brother    No Known Problems Sister, Brother, Sister, Sister    Thyroid disease Mother             Tobacco Use    Smoking status: Every Day     Current packs/day: 0.00     Average packs/day: 0.5 packs/day for 40.0 years (20.0 ttl pk-yrs)     Types: Cigarettes     Start date: 1982     Last attempt to quit: 2022     Years since quittin.4    Smokeless tobacco: Never    Tobacco comments:     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     Patient is currently retired   Substance and Sexual Activity    Alcohol use: No    Drug use: No    Sexual activity: Yes     Partners: Female       Review of Systems   Constitutional:  Negative for chills and fever.   Gastrointestinal:  Negative for abdominal pain, nausea and vomiting.   Genitourinary:  Negative for hematuria.       Objective:     Temp:  [98.6 °F (37 °C)] 98.6 °F (37 °C)  Pulse:  [77] 77  Resp:  [16-20] 18  SpO2:  [100 %] 100 %  BP: (137)/(62) 137/62  Weight: 79.4 kg (175 lb)  Body mass index is 23.09 kg/m².    Date 24 07 - 24 0659   Shift 5149-9936 9609-7395 5607-5216 24 Hour Total   INTAKE   IV Piggyback  254.9  254.9   Shift Total(mL/kg)  254.9(3.2)  254.9(3.2)   OUTPUT   Shift Total(mL/kg)       Weight (kg) 79.4 79.4 79.4 79.4          Drains       Drain  Duration                  Hemodialysis AV Fistula Left upper arm -- days         Hemodialysis AV Fistula 24 0924 Left upper arm 175 days                     Physical Exam  Cardiovascular:      Rate and Rhythm: Normal rate.   Pulmonary:      Effort: Pulmonary effort is normal.   Genitourinary:     Comments: 4 cm area of fluctuance and induration at the base of the scrotum just superior to the perineum.  No overlying skin changes, no crepitus.    Bilateral testicles and cord structures palpable within the scrotum.  Penis uncircumcised.    Neurological:      Mental Status: He is alert.          Significant Labs:    BMP:  Recent Labs   Lab 24  1411      K 4.4      CO2 22*   BUN 40*   CREATININE 7.0*   CALCIUM 8.4*        CBC:  Recent Labs   Lab 08/12/24  1411   WBC 12.13   HGB 12.0*   HCT 39.1*   *       All pertinent labs results from the past 24 hours have been reviewed.    Significant Imaging:  All pertinent imaging results/findings from the past 24 hours have been reviewed.                    Assessment and Plan:     Scrotal abscess  68M w/ scrotal abscess.    - scrotal abscess drained at bedside with return of purulent drainage.  Cultures sent- will f/u results.    - ok for discharge from the ED from urology perspective   - recommend discharge with 2 weeks of keflex 500 mg BID   - will arrange outpatient urology f/u for wound check this week   - please discharge patient with wound care supplies, discussed daily packing changes with wife   - rest of care per ED         VTE Risk Mitigation (From admission, onward)      None            Thank you for your consult. I will sign off. Please contact us if you have any additional questions.    Danielle Ryan MD  Urology  [facility] - Emergency Dept

## 2024-08-13 NOTE — DISCHARGE INSTRUCTIONS
Follow-up with urology this week and your kidney doctor, return immediately for any new or worsening symptoms.

## 2024-08-13 NOTE — PROGRESS NOTES
Progress Note    Received patient at signout.    69 y/o M here with scrotal abscess pending urology evaluation.    -Urology performed I&D of the abscess. They are okay with discharge with abx, pain meds, and they will see him this week in clinic. Okay to cancel US per urology.  -Creat high, however he is ESRD on HD, potassium normal.  -Chronic findings seen on CT abd  -Will DC, f/u with urology this week, scripts given, all questions answered, strict RTER precautions given, patient and family expressed understanding.

## 2024-08-13 NOTE — ASSESSMENT & PLAN NOTE
68M w/ scrotal abscess.    - scrotal abscess drained at bedside with return of purulent drainage.  Cultures sent- will f/u results.    - ok for discharge from the ED from urology perspective   - recommend discharge with 2 weeks of keflex 500 mg BID   - will arrange outpatient urology f/u for wound check this week   - please discharge patient with wound care supplies, discussed daily packing changes with wife   - rest of care per ED

## 2024-08-13 NOTE — PROCEDURES
"Ibrahima Phelps is a 68 y.o. male patient.    Temp: 98.8 °F (37.1 °C) (08/12/24 2012)  Pulse: 76 (08/12/24 2012)  Resp: 20 (08/12/24 2012)  BP: 128/76 (08/12/24 2012)  SpO2: 98 % (08/12/24 2012)  Weight: 79.4 kg (175 lb) (08/12/24 1054)  Height: 6' 1" (185.4 cm) (08/12/24 1054)       Incision and Drainage    Date/Time: 8/12/2024 7:49 PM  Location procedure was performed: [facility] UROLOGY    Performed by: Danielle Ryan MD  Authorized by: Danielle Ryan MD  Pre-operative diagnosis: scrotal abscess  Post-operative diagnosis: scrotal abscess  Consent Done: Yes  Consent: Verbal consent obtained. Written consent obtained.  Risks and benefits: risks, benefits and alternatives were discussed  Consent given by: patient  Type: abscess  Anesthesia: local infiltration    Anesthesia:  Local Anesthetic: lidocaine 1% with epinephrine    Patient sedated: no  Description of findings: 3.5 cm vertical incision on posterior aspect of scrotum with purulent drainage.  Packed with 1/4 in gauze   Incision depth: subcutaneous  Complexity: simple  Drainage: pus  Drainage amount: moderate  Wound treatment: wound packed  Packing material: 1/4 in gauze  Complications: No  Estimated blood loss (mL): 0  Specimens: Yes  Implants: No  Patient tolerance: Patient tolerated the procedure well with no immediate complications    Incision depth: subcutaneous          8/13/2024    "

## 2024-08-13 NOTE — SUBJECTIVE & OBJECTIVE
"Past Medical History:   Diagnosis Date    Atrial fibrillation     CAD (coronary artery disease) 2006    MI in 2006    CHF (congestive heart failure) 2017    CKD (chronic kidney disease) stage 3, GFR 30-59 ml/min     Dr. Latif.  in transplanted kidney    CKD (chronic kidney disease) stage 5, GFR less than 15 ml/min 02/02/2024    COVID-19 02/07/2023    Diverticulosis     Hyperlipidemia     Hypertension     Keloid cicatrix     Metabolic bone disease     Other emphysema 10/01/2019    Pericarditis     S/P kidney transplant 1992    x2 (1992 & 2005),    Thrombocytopenia     Thyroid disease     Tobacco use 09/05/2014       Past Surgical History:   Procedure Laterality Date    AV FISTULA PLACEMENT Left 12/18/2023    Procedure: CREATION, AV FISTULA;  Surgeon: JUANI Melendez III, MD;  [facility]  Service: Vascular;  Laterality: Left;  LUE AVF creation vs AVG insertion    CARDIOVERSION  04/30/15    CHOLECYSTECTOMY      COLONOSCOPY  April 20, 2011    Diverticulosis, repeat recommended in 3 yrs., repeat colonoscopy 2014 revealed 2 polyps.  He should return in 5 years.    COLONOSCOPY N/A 3/13/2020    Procedure: COLONOSCOPY;  Surgeon: Chon Casper MD;  Location: [facility] (4TH FLR);  Service: Endoscopy;  Laterality: N/A;  ok to hold coumadin x5days-see telephone encounter 2/4/20-tb    COLONOSCOPY N/A 2/4/2021    Procedure: COLONOSCOPY;  Surgeon: Chon Casper MD;  Location: [facility] (4TH FLR);  Service: Endoscopy;  Laterality: N/A;  Eliquis - per Dr. GAVIN Blunt ok to hold x 2 days and "restarted asap"- ERW  last prep "poor", [id number] ordered/ Pt requests Dr. Casper  prep ins. mailed - COVID screening on 2/1/21 PCW- ERW    COLONOSCOPY N/A 3/3/2021    Procedure: COLONOSCOPY;  Surgeon: Chon Casper MD;  Location: [facility] (4TH FLR);  Service: Endoscopy;  Laterality: N/A;  Patient with his second poor bowel pre and has poor prep today.  If patient not intersted or can't get colonoscopy tomorrow will " need constipation bowel prep and will need to restart his Eliquis today.Thanks,Chon     per Dr Lopez to hold Eliquis 2 days prior      COVID     CORONARY ANGIOGRAPHY N/A 2/28/2024    Procedure: ANGIOGRAM, CORONARY ARTERY;  Surgeon: Tylor Lincoln MD;  Location: [facility] CATH LAB;  Service: Cardiology;  Laterality: N/A;    CORONARY ANGIOPLASTY WITH STENT PLACEMENT  9/13/2006    FISTULOGRAM N/A 2/19/2024    Procedure: Fistulogram;  Surgeon: JUANI Melendez III, MD;  Location: [facility] CATH LAB;  Service: Peripheral Vascular;  Laterality: N/A;    FISTULOGRAM, WITH PTA Left 6/3/2024    Procedure: FISTULOGRAM, WITH PTA;  Surgeon: JUANI Melendez III, MD;  Location: [facility] OR [facility] FLR;  Service: Vascular;  Laterality: Left;  mGy: 51.58  GyCm2: 6.8285  Fluoro time: 5.1 min  Contrast: 28 cc    IRRIGATION AND DEBRIDEMENT Right 8/30/2023    Procedure: IRRIGATION AND DEBRIDEMENT, RIGHT MIDDLE FINGER;  Surgeon: Martin Larsen MD;  Location: [facility] OR [facility] FLR;  Service: Orthopedics;  Laterality: Right;    KIDNEY TRANSPLANT  2005    LEFT HEART CATHETERIZATION N/A 2/26/2024    Procedure: Left heart cath;  Surgeon: Tylor Lincoln MD;  Location: [facility] CATH LAB;  Service: Cardiology;  Laterality: N/A;    PARATHYROIDECTOMY      PERCUTANEOUS TRANSLUMINAL ANGIOPLASTY OF ARTERIOVENOUS FISTULA Left 2/19/2024    Procedure: PTA, AV FISTULA;  Surgeon: JUANI Melendez III, MD;  Location: [facility] CATH LAB;  Service: Peripheral Vascular;  Laterality: Left;    STENT, DRUG ELUTING, SINGLE VESSEL, CORONARY N/A 2/28/2024    Procedure: Stent, Drug Eluting, Single Vessel, Coronary;  Surgeon: Tylor Lincoln MD;  Location: [facility] CATH LAB;  Service: Cardiology;  Laterality: N/A;    TREATMENT OF CARDIAC ARRHYTHMIA N/A 3/28/2022    Procedure: CARDIOVERSION;  Surgeon: Migue Blunt MD;  Location: [facility] EP LAB;  Service: Cardiology;  Laterality: N/A;  AF, DCC, MAC, GP, 3 PREP*RODRIGO deferred pt 100% compliant*    VENOPLASTY  6/3/2024    Procedure:  ANGIOPLASTY, VEIN;  Surgeon: JUANI Melendez III, MD;  Location: [facility] OR [address] [place] [address];  Service: Vascular;;  Left subclavian       Review of patient's allergies indicates:   Allergen Reactions    Ace inhibitors Swelling    Verapamil Other (See Comments)     Other reaction(s): Unknown    Povidone-iodine Itching       Family History       Problem Relation (Age of Onset)    Heart disease Father    Kidney disease Sister, Brother    Kidney failure Sister, Brother    No Known Problems Sister, Brother, Sister, Sister    Thyroid disease Mother            Tobacco Use    Smoking status: Every Day     Current packs/day: 0.00     Average packs/day: 0.5 packs/day for 40.0 years (20.0 ttl pk-yrs)     Types: Cigarettes     Start date: 1982     Last attempt to quit: 2022     Years since quittin.4    Smokeless tobacco: Never    Tobacco comments:     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     Patient is currently retired   Substance and Sexual Activity    Alcohol use: No    Drug use: No    Sexual activity: Yes     Partners: Female       Review of Systems   Constitutional:  Negative for chills and fever.   Gastrointestinal:  Negative for abdominal pain, nausea and vomiting.   Genitourinary:  Negative for hematuria.       Objective:     Temp:  [98.6 °F (37 °C)] 98.6 °F (37 °C)  Pulse:  [77] 77  Resp:  [16-20] 18  SpO2:  [100 %] 100 %  BP: (137)/(62) 137/62  Weight: 79.4 kg (175 lb)  Body mass index is 23.09 kg/m².    Date 24 07 - 24 0659   Shift 1178-9484 6336-5638 4130-8124 24 Hour Total   INTAKE   IV Piggyback  254.9  254.9   Shift Total(mL/kg)  254.9(3.2)  254.9(3.2)   OUTPUT   Shift Total(mL/kg)       Weight (kg) 79.4 79.4 79.4 79.4          Drains       Drain  Duration                  Hemodialysis AV Fistula Left upper arm -- days         Hemodialysis AV Fistula 24 0924 Left upper arm 175 days                     Physical Exam  Cardiovascular:      Rate and  Rhythm: Normal rate.   Pulmonary:      Effort: Pulmonary effort is normal.   Genitourinary:     Comments: 4 cm area of fluctuance and induration at the base of the scrotum just superior to the perineum.  No overlying skin changes, no crepitus.    Bilateral testicles and cord structures palpable within the scrotum.  Penis uncircumcised.    Neurological:      Mental Status: He is alert.          Significant Labs:    BMP:  Recent Labs   Lab 08/12/24  1411      K 4.4      CO2 22*   BUN 40*   CREATININE 7.0*   CALCIUM 8.4*       CBC:  Recent Labs   Lab 08/12/24  1411   WBC 12.13   HGB 12.0*   HCT 39.1*   *       All pertinent labs results from the past 24 hours have been reviewed.    Significant Imaging:  All pertinent imaging results/findings from the past 24 hours have been reviewed.

## 2024-08-14 LAB — BACTERIA SPEC AEROBE CULT: NORMAL

## 2024-08-15 ENCOUNTER — TELEPHONE (OUTPATIENT)
Dept: EMERGENCY MEDICINE | Facility: HOSPITAL | Age: 69
End: 2024-08-15
Payer: MEDICARE

## 2024-08-15 RX ORDER — METRONIDAZOLE 500 MG/1
500 TABLET ORAL EVERY 12 HOURS
Qty: 14 TABLET | Refills: 0 | Status: SHIPPED | OUTPATIENT
Start: 2024-08-15 | End: 2024-08-22

## 2024-08-15 NOTE — TELEPHONE ENCOUNTER
Patient had abscess that was I&D in the ER. He was d/c on keflex. Culture shows bacteroides fragilis. Flagyl/metronidazole would be better coverage. I called patient, but he was at dialysis. Wife states he is doing better. Since ucluture shows species, we should change to better coverage. Recommend flagyl 500 mg bid for 7 days, take after dialysis on dialysis days. D/c keflex. Patient's wife voiced her understanding.

## 2024-08-16 ENCOUNTER — OFFICE VISIT (OUTPATIENT)
Dept: UROLOGY | Facility: CLINIC | Age: 69
End: 2024-08-16
Payer: MEDICARE

## 2024-08-16 VITALS
DIASTOLIC BLOOD PRESSURE: 68 MMHG | HEIGHT: 73 IN | BODY MASS INDEX: 23.09 KG/M2 | HEART RATE: 56 BPM | WEIGHT: 174.19 LBS | SYSTOLIC BLOOD PRESSURE: 143 MMHG

## 2024-08-16 DIAGNOSIS — N49.2 SCROTAL ABSCESS: ICD-10-CM

## 2024-08-16 LAB — BACTERIA SPEC ANAEROBE CULT: ABNORMAL

## 2024-08-16 PROCEDURE — 1126F AMNT PAIN NOTED NONE PRSNT: CPT | Mod: HCNC,CPTII,S$GLB,

## 2024-08-16 PROCEDURE — 99214 OFFICE O/P EST MOD 30 MIN: CPT | Mod: HCNC,S$GLB,,

## 2024-08-16 PROCEDURE — 3066F NEPHROPATHY DOC TX: CPT | Mod: HCNC,CPTII,S$GLB,

## 2024-08-16 PROCEDURE — 3288F FALL RISK ASSESSMENT DOCD: CPT | Mod: HCNC,CPTII,S$GLB,

## 2024-08-16 PROCEDURE — 99999 PR PBB SHADOW E&M-EST. PATIENT-LVL V: CPT | Mod: PBBFAC,HCNC,,

## 2024-08-16 PROCEDURE — 1101F PT FALLS ASSESS-DOCD LE1/YR: CPT | Mod: HCNC,CPTII,S$GLB,

## 2024-08-16 PROCEDURE — 1159F MED LIST DOCD IN RCRD: CPT | Mod: HCNC,CPTII,S$GLB,

## 2024-08-16 PROCEDURE — 3008F BODY MASS INDEX DOCD: CPT | Mod: HCNC,CPTII,S$GLB,

## 2024-08-16 PROCEDURE — 3078F DIAST BP <80 MM HG: CPT | Mod: HCNC,CPTII,S$GLB,

## 2024-08-16 PROCEDURE — 3044F HG A1C LEVEL LT 7.0%: CPT | Mod: HCNC,CPTII,S$GLB,

## 2024-08-16 PROCEDURE — 3077F SYST BP >= 140 MM HG: CPT | Mod: HCNC,CPTII,S$GLB,

## 2024-08-16 PROCEDURE — 1160F RVW MEDS BY RX/DR IN RCRD: CPT | Mod: HCNC,CPTII,S$GLB,

## 2024-08-16 NOTE — PROGRESS NOTES
CHIEF COMPLAINT:  Scrotal wound       HISTORY OF PRESENTING ILLINESS:  Ibrahima Phelps is a 68 y.o. male with history of ESRD on HD Tues/Thur/Sat, oliguric, (s/p renal transplant x2), NSTEMI s/p PCI on elliquis and plavix,  presented to the ED with worsening scrotal pain and swelling 8/12/2024. Urology consulted for scrotal abscess which was drained 8/13/2024. Here today for wound check. Received vanc and zosyn in the ED. Currently on Flagyl for positive anaerobic culture with bacteroides fragilis.      CTAP obtained in the ED shows bilateral atrophic kidneys without hydronephrosis bilaterally.  CT does not fully visualize the superior scrotum.      PMHx listed below.     REVIEW OF SYSTEMS:  Review of Systems   Constitutional:  Negative for chills and fever.   HENT:  Negative for congestion and sore throat.    Respiratory:  Negative for cough and shortness of breath.    Cardiovascular:  Negative for chest pain and palpitations.   Gastrointestinal:  Negative for nausea and vomiting.   Neurological:  Negative for dizziness and headaches.         PATIENT HISTORY:    Past Medical History:   Diagnosis Date    Atrial fibrillation     CAD (coronary artery disease) 2006    MI in 2006    CHF (congestive heart failure) 2017    CKD (chronic kidney disease) stage 3, GFR 30-59 ml/min     Dr. Latif.  in transplanted kidney    CKD (chronic kidney disease) stage 5, GFR less than 15 ml/min 02/02/2024    COVID-19 02/07/2023    Diverticulosis     Hyperlipidemia     Hypertension     Keloid cicatrix     Metabolic bone disease     Other emphysema 10/01/2019    Pericarditis     S/P kidney transplant 1992    x2 (1992 & 2005),    Thrombocytopenia     Thyroid disease     Tobacco use 09/05/2014       Past Surgical History:   Procedure Laterality Date    AV FISTULA PLACEMENT Left 12/18/2023    Procedure: CREATION, AV FISTULA;  Surgeon: JUANI Melendez III, MD;  Location: [facility] OR [address] [place] [address];  Service: Vascular;  Laterality: Left;  LUE AVF  "creation vs AVG insertion    CARDIOVERSION  04/30/15    CHOLECYSTECTOMY      COLONOSCOPY  April 20, 2011    Diverticulosis, repeat recommended in 3 yrs., repeat colonoscopy 2014 revealed 2 polyps.  He should return in 5 years.    COLONOSCOPY N/A 3/13/2020    Procedure: COLONOSCOPY;  Surgeon: Chon Casper MD;  Location: [facility] ENDO (4TH FLR);  Service: Endoscopy;  Laterality: N/A;  ok to hold coumadin x5days-see telephone encounter 2/4/20-tb    COLONOSCOPY N/A 2/4/2021    Procedure: COLONOSCOPY;  Surgeon: Chon Casper MD;  Location: [facility] ENDO (4TH FLR);  Service: Endoscopy;  Laterality: N/A;  Eliquis - per Dr. GAVIN Blunt ok to hold x 2 days and "restarted asap"- ERW  last prep "poor", [id number] ordered/ Pt requests Dr. Casper  prep ins. mailed - COVID screening on 2/1/21 PCW- ERW    COLONOSCOPY N/A 3/3/2021    Procedure: COLONOSCOPY;  Surgeon: Chon Casper MD;  Location: [facility] ENDO (4TH FLR);  Service: Endoscopy;  Laterality: N/A;  Patient with his second poor bowel pre and has poor prep today.  If patient not intersted or can't get colonoscopy tomorrow will need constipation bowel prep and will need to restart his Eliquis today.Thanks,Chon Blunt-ok to hold Eliquis 2 days prior      COVID     CORONARY ANGIOGRAPHY N/A 2/28/2024    Procedure: ANGIOGRAM, CORONARY ARTERY;  Surgeon: Tylor Lincoln MD;  Location: [facility] CATH LAB;  Service: Cardiology;  Laterality: N/A;    CORONARY ANGIOPLASTY WITH STENT PLACEMENT  9/13/2006    FISTULOGRAM N/A 2/19/2024    Procedure: Fistulogram;  Surgeon: JUANI Melendez III, MD;  Location: [facility] CATH LAB;  Service: Peripheral Vascular;  Laterality: N/A;    FISTULOGRAM, WITH PTA Left 6/3/2024    Procedure: FISTULOGRAM, WITH PTA;  Surgeon: JUANI Melendez III, MD;  Location: [facility] OR 2ND FLR;  Service: Vascular;  Laterality: Left;  mGy: 51.58  GyCm2: 6.8285  Fluoro time: 5.1 min  Contrast: 28 cc    IRRIGATION AND DEBRIDEMENT Right 8/30/2023    Procedure: IRRIGATION " AND DEBRIDEMENT, RIGHT MIDDLE FINGER;  Surgeon: Martin Larsen MD;  [facility];  Service: Orthopedics;  Laterality: Right;    KIDNEY TRANSPLANT  2005    LEFT HEART CATHETERIZATION N/A 2/26/2024    Procedure: Left heart cath;  Surgeon: Tylor Lincoln MD;  Location: [facility] CATH LAB;  Service: Cardiology;  Laterality: N/A;    PARATHYROIDECTOMY      PERCUTANEOUS TRANSLUMINAL ANGIOPLASTY OF ARTERIOVENOUS FISTULA Left 2/19/2024    Procedure: PTA, AV FISTULA;  Surgeon: JUANI Melendez III, MD;  Location: [facility] CATH LAB;  Service: Peripheral Vascular;  Laterality: Left;    STENT, DRUG ELUTING, SINGLE VESSEL, CORONARY N/A 2/28/2024    Procedure: Stent, Drug Eluting, Single Vessel, Coronary;  Surgeon: Tylor Lincoln MD;  Location: [facility] CATH LAB;  Service: Cardiology;  Laterality: N/A;    TREATMENT OF CARDIAC ARRHYTHMIA N/A 3/28/2022    Procedure: CARDIOVERSION;  Surgeon: Migue Blunt MD;  Location: [facility] EP LAB;  Service: Cardiology;  Laterality: N/A;  AF, DCC, MAC, GP, 3 PREP*RODRIGO deferred pt 100% compliant*    VENOPLASTY  6/3/2024    Procedure: ANGIOPLASTY, VEIN;  Surgeon: JUANI Melendez III, MD;  [facility];  Service: Vascular;;  Left subclavian       Family History   Problem Relation Name Age of Onset    No Known Problems Sister      No Known Problems Brother      Thyroid disease Mother          s/p surgery    Heart disease Father          had pacemaker    No Known Problems Sister      Kidney failure Sister      Kidney disease Sister      No Known Problems Sister      Kidney disease Brother      Kidney failure Brother          s/p transplant    Diabetes Mellitus Neg Hx      Stroke Neg Hx      Heart attack Neg Hx      Cancer Neg Hx      Celiac disease Neg Hx      Cirrhosis Neg Hx      Colon cancer Neg Hx      Esophageal cancer Neg Hx      Inflammatory bowel disease Neg Hx      Rectal cancer Neg Hx      Stomach cancer Neg Hx      Ulcerative colitis Neg Hx      Liver disease  Neg Hx      Liver cancer Neg Hx      Crohn's disease Neg Hx      Melanoma Neg Hx         Social History     Socioeconomic History    Marital status:    Occupational History    Occupation:    Tobacco Use    Smoking status: Every Day     Current packs/day: 0.00     Average packs/day: 0.5 packs/day for 40.0 years (20.0 ttl pk-yrs)     Types: Cigarettes     Start date: 1982     Last attempt to quit: 2022     Years since quittin.4    Smokeless tobacco: Never    Tobacco comments:     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     Patient is currently retired   Substance and Sexual Activity    Alcohol use: No    Drug use: No    Sexual activity: Yes     Partners: Female   Social History Narrative    Retired      Social Determinants of Health     Financial Resource Strain: Low Risk  (4/10/2024)    Overall Financial Resource Strain (CARDIA)     Difficulty of Paying Living Expenses: Not very hard   Recent Concern: Financial Resource Strain - Medium Risk (2024)    Overall Financial Resource Strain (CARDIA)     Difficulty of Paying Living Expenses: Somewhat hard   Food Insecurity: No Food Insecurity (4/10/2024)    Hunger Vital Sign     Worried About Running Out of Food in the Last Year: Never true     Ran Out of Food in the Last Year: Never true   Transportation Needs: No Transportation Needs (4/10/2024)    PRAPARE - Transportation     Lack of Transportation (Medical): No     Lack of Transportation (Non-Medical): No   Physical Activity: Inactive (4/10/2024)    Exercise Vital Sign     Days of Exercise per Week: 0 days     Minutes of Exercise per Session: 0 min   Stress: Stress Concern Present (4/10/2024)    [employer] [place] [employer] - Occupational Stress Questionnaire     Feeling of Stress : Rather much   Housing Stability: Low Risk  (4/10/2024)    Housing Stability Vital Sign     Unable to Pay for Housing in the Last Year: No      Number of Places Lived in the Last Year: 1     Unstable Housing in the Last Year: No       Allergies:  Ace inhibitors, Verapamil, and Povidone-iodine    Medications:    Current Outpatient Medications:     acetaminophen (TYLENOL) 500 MG tablet, Take 1 tablet (500 mg total) by mouth every 6 (six) hours as needed for Pain., Disp: 20 tablet, Rfl: 0    albuterol (VENTOLIN HFA) 90 mcg/actuation inhaler, Inhale 2 puffs into the lungs every 6 (six) hours as needed for Wheezing or Shortness of Breath. Rescue, Disp: 18 g, Rfl: 3    albuterol-ipratropium (DUO-NEB) 2.5 mg-0.5 mg/3 mL nebulizer solution, Take 3 mLs by nebulization every 4 (four) hours as needed for Wheezing. Rescue, Disp: 75 mL, Rfl: 0    amiodarone (PACERONE) 200 MG Tab, Take 1 tablet (200 mg total) by mouth once daily., Disp: 30 tablet, Rfl: 11    atorvastatin (LIPITOR) 80 MG tablet, Take 1 tablet (80 mg total) by mouth once daily., Disp: 90 tablet, Rfl: 3    b complex vitamins (B COMPLEX-VITAMIN B12) tablet, Take 1 tablet by mouth once daily., Disp: 90 tablet, Rfl: 3    calcium carbonate (CALCIUM 600 ORAL), Take 1 tablet by mouth 2 (two) times a day., Disp: , Rfl:     cephALEXin (KEFLEX) 500 MG capsule, Take 1 capsule (500 mg total) by mouth every 12 (twelve) hours. for 14 days, Disp: 28 capsule, Rfl: 0    CHOLECALCIFEROL, VITAMIN D3, ORAL, Take 2 tablets by mouth 2 (two) times daily., Disp: , Rfl:     clopidogreL (PLAVIX) 75 mg tablet, Take 1 tablet (75 mg total) by mouth once daily., Disp: 90 tablet, Rfl: 3    doxazosin (CARDURA) 4 MG tablet, Take 1 tablet (4 mg total) by mouth every 12 (twelve) hours., Disp: 180 tablet, Rfl: 3    ELIQUIS 5 mg Tab, TAKE 1 TABLET BY MOUTH TWICE A DAY, Disp: 60 tablet, Rfl: 32    famotidine (PEPCID) 20 MG tablet, Take 1 tablet (20 mg total) by mouth once daily. On dialysis days, please take this medicine after dialysis., Disp: 30 tablet, Rfl: 3    fexofenadine HCl (ALLEGRA ORAL), Take 1 tablet by mouth daily as needed  (allergies)., Disp: , Rfl:     fluticasone furoate-vilanteroL (BREO) 100-25 mcg/dose diskus inhaler, Inhale 1 puff into the lungs once daily. Controller. Use this inhaler every day., Disp: , Rfl:     fluticasone propionate (FLONASE) 50 mcg/actuation nasal spray, 1 spray (50 mcg total) by Each Nostril route daily as needed for Allergies., Disp: , Rfl:     furosemide (LASIX) 20 MG tablet, Take 4 tablets (80 mg total) by mouth 2 (two) times daily as needed. For worsening LE swelling or shortness of breath on non-dialysis days, Disp: , Rfl:     gabapentin (NEURONTIN) 100 MG capsule, Take 1 capsule (100 mg total) by mouth as needed (pain)., Disp: , Rfl:     hydrALAZINE (APRESOLINE) 50 MG tablet, Take 1 tablet (50 mg total) by mouth 3 (three) times daily. Hold until follow up with PCP or cardiologist, Disp: 270 tablet, Rfl: 3    metoprolol succinate (TOPROL-XL) 25 MG 24 hr tablet, Take 0.5 tablets (12.5 mg total) by mouth once daily., Disp: 45 tablet, Rfl: 3    metroNIDAZOLE (FLAGYL) 500 MG tablet, Take 1 tablet (500 mg total) by mouth every 12 (twelve) hours. for 7 days, Disp: 14 tablet, Rfl: 0    multivitamin (THERAGRAN) per tablet, Take 1 tablet by mouth Daily., Disp: , Rfl:     NIFEdipine (PROCARDIA-XL) 60 MG (OSM) 24 hr tablet, Take 1 tablet (60 mg total) by mouth 2 (two) times a day. Hold until follow up with PCP or cardiologist, Disp: 180 tablet, Rfl: 3    nitroGLYCERIN (NITROSTAT) 0.4 MG SL tablet, Place 1 tablet (0.4 mg total) under the tongue every 5 (five) minutes as needed for Chest pain., Disp: 25 tablet, Rfl: 0    omeprazole (PRILOSEC) 20 MG capsule, Take 1 capsule (20 mg total) by mouth daily as needed (heartburn)., Disp: , Rfl:     oxyCODONE-acetaminophen (PERCOCET) 5-325 mg per tablet, Take 1 tablet by mouth every 6 (six) hours as needed for Pain., Disp: 12 tablet, Rfl: 0    paricalcitoL (ZEMPLAR) 1 MCG capsule, TAKE 1 CAPSULE ON MONDAY, WEDNESDAY AND FRIDAY, Disp: 36 capsule, Rfl: 3    predniSONE  (DELTASONE) 5 MG tablet, Take 1 tablet (5 mg total) by mouth once daily., Disp: 90 tablet, Rfl: 3    pulse oximeter (PULSE OXIMETER) device, by Apply Externally route 2 (two) times a day. Use twice daily at 8 AM and 3 PM and record the value in [facility] as directed., Disp: 1 each, Rfl: 0    sevelamer carbonate (RENVELA) 800 mg Tab, Take 1 tablet (800 mg total) by mouth 3 (three) times daily with meals. (Patient not taking: Reported on 5/29/2024), Disp: 90 tablet, Rfl: 11    suvorexant (BELSOMRA) 5 mg Tab, Take 5 mg by mouth nightly as needed (insomnia)., Disp: 30 tablet, Rfl: 2    tacrolimus (PROGRAF) 1 MG Cap, Take 3 capsules (3 mg total) by mouth every 12 (twelve) hours for 60 days, THEN 2 capsules (2 mg total) every 12 (twelve) hours for 60 days, THEN 1 capsule (1 mg total) every 12 (twelve) hours for 60 days, THEN 1 capsule (1 mg total) once daily., Disp: 540 capsule, Rfl: 4    tiotropium bromide (SPIRIVA RESPIMAT) 1.25 mcg/actuation inhaler, Inhale 2 puffs into the lungs once daily. Controller. Use this inhaler every day., Disp: 4 g, Rfl: 11    traMADoL (ULTRAM) 50 mg tablet, Take 1 tablet (50 mg total) by mouth every 6 (six) hours as needed for Pain., Disp: 20 tablet, Rfl: 0    PHYSICAL EXAMINATION:  Physical Exam  HENT:      Head: Normocephalic and atraumatic.      Right Ear: External ear normal.      Left Ear: External ear normal.      Nose: Nose normal.      Mouth/Throat:      Mouth: Mucous membranes are moist.   Pulmonary:      Effort: Pulmonary effort is normal. No respiratory distress.   Genitourinary:     Comments: Wound healing well, scant drainage, no erythema or purulent discharge  Skin:     General: Skin is warm and dry.   Neurological:      General: No focal deficit present.      Mental Status: He is alert and oriented to person, place, and time.   Psychiatric:         Mood and Affect: Mood normal.         Behavior: Behavior normal.               Lab Results   Component Value Date    PSA 0.56  02/06/2024    PSA 0.69 01/04/2023    PSA 0.51 12/09/2020       Lab Results   Component Value Date    CREATININE 7.0 (H) 08/12/2024    EGFRNORACEVR 7.9 (A) 08/12/2024           IMPRESSION:    Encounter Diagnoses   Name Primary?    Scrotal abscess          Assessment:       1. Scrotal abscess        Plan:   - Continue good hygiene and wound care. Avoid strenuous activities like running, stretching, and weight lifting for 2-3 weeks, and you shouldn't lift anything over 10 pounds for 4-6 weeks. You can walk up and down stairs, and you can drive if you aren't taking narcotic pain medicine. You should also get enough sleep and rest when you feel tired. You should follow a healthy diet and drink plenty of water. You should also avoid sex until you're fully recovered.  - Complete Flagyl as prescribed.     RTC PRN    I spent 30 minutes with the patient of which more than half was spent in direct consultation with the patient in regards to our treatment and plan.  We addressed the office findings and recent labs; any possible need to go the ER today.   Education and recommendations of today's plan of care including home remedies and needed follow up with PCP.

## 2024-08-16 NOTE — PATIENT INSTRUCTIONS
Activity  You should avoid strenuous activities like running, stretching, and weight lifting for 2-3 weeks, and you shouldn't lift anything over 10 pounds for 4-6 weeks. You can walk up and down stairs, and you can drive if you aren't taking narcotic pain medicine. You should also get enough sleep and rest when you feel tired.       Other instructions  You should follow a healthy diet and drink plenty of water. You should also avoid sex until you're fully recovered, and you should make a follow-up appointment within 1-2 weeks to ensure your wound is healing properly    Complete Flgayl as prescribed.

## 2024-08-17 LAB
BACTERIA BLD CULT: NORMAL
BACTERIA BLD CULT: NORMAL

## 2024-08-28 RX ORDER — AMIODARONE HYDROCHLORIDE 200 MG/1
200 TABLET ORAL
Qty: 90 TABLET | Refills: 3 | Status: SHIPPED | OUTPATIENT
Start: 2024-08-28

## 2024-08-29 ENCOUNTER — OFFICE VISIT (OUTPATIENT)
Dept: INTERNAL MEDICINE | Facility: CLINIC | Age: 69
End: 2024-08-29
Payer: MEDICARE

## 2024-08-29 VITALS
HEART RATE: 55 BPM | DIASTOLIC BLOOD PRESSURE: 70 MMHG | HEIGHT: 73 IN | SYSTOLIC BLOOD PRESSURE: 152 MMHG | WEIGHT: 177.94 LBS | OXYGEN SATURATION: 98 % | BODY MASS INDEX: 23.58 KG/M2

## 2024-08-29 DIAGNOSIS — E78.2 MIXED HYPERLIPIDEMIA: Chronic | ICD-10-CM

## 2024-08-29 DIAGNOSIS — Z94.0 H/O KIDNEY TRANSPLANT: Chronic | ICD-10-CM

## 2024-08-29 DIAGNOSIS — I48.0 PAROXYSMAL ATRIAL FIBRILLATION: Chronic | ICD-10-CM

## 2024-08-29 DIAGNOSIS — Z99.2 ESRD (END STAGE RENAL DISEASE) ON DIALYSIS: ICD-10-CM

## 2024-08-29 DIAGNOSIS — N18.6 ESRD (END STAGE RENAL DISEASE) ON DIALYSIS: ICD-10-CM

## 2024-08-29 DIAGNOSIS — I25.10 CORONARY ARTERY DISEASE INVOLVING NATIVE CORONARY ARTERY OF NATIVE HEART WITHOUT ANGINA PECTORIS: Chronic | ICD-10-CM

## 2024-08-29 DIAGNOSIS — Z00.00 ANNUAL PHYSICAL EXAM: Primary | ICD-10-CM

## 2024-08-29 DIAGNOSIS — N49.2 SCROTAL ABSCESS: ICD-10-CM

## 2024-08-29 DIAGNOSIS — I10 PRIMARY HYPERTENSION: ICD-10-CM

## 2024-08-29 PROCEDURE — 99999 PR PBB SHADOW E&M-EST. PATIENT-LVL V: CPT | Mod: PBBFAC,HCNC,, | Performed by: STUDENT IN AN ORGANIZED HEALTH CARE EDUCATION/TRAINING PROGRAM

## 2024-08-29 NOTE — PROGRESS NOTES
SUBJECTIVE     Chief Complaint   Patient presents with    Annual Exam       HPI  Ibrahima Phelps is a 68 y.o. male with  HTN, HLD, CAD, pAFib, HFpEF, pulmonary HTN, ESRD on HD T/Th/S with anemia of chronic renal disease & secondary hyperparathyroidism, h/o failed kidney transplant, on immunosuppressants, polyneuropathy, benign essential tremor  that presents for annual exam.     Patient presents for routine annual exam.  Has been feeling well.  No specific concerns today.  Prior lab results, need for future labs, screening studies and issues related to family history were assessed.    Seen in the ED 8/12/24 for scrotal abscess that was drained at bedside by Urology. Discharged on 2 weeks of Keflex 500 mg BID. Culture grew bacteroides fragilis, changed to Flagyl for treatment. Seen by Urology for follow up, noted site healing well.     BP elevated today, will be having HD this morning. BP typically elevated on dialysis days before being dialyzed.     Due for colonoscopy. Endoscopy  referral has been placed, patient needs to set up appointment.     PAST MEDICAL HISTORY:  Past Medical History:   Diagnosis Date    Atrial fibrillation     CAD (coronary artery disease) 2006    MI in 2006    CHF (congestive heart failure) 2017    CKD (chronic kidney disease) stage 3, GFR 30-59 ml/min     Dr. Latif.  in transplanted kidney    CKD (chronic kidney disease) stage 5, GFR less than 15 ml/min 02/02/2024    COVID-19 02/07/2023    Diverticulosis     Hyperlipidemia     Hypertension     Keloid cicatrix     Metabolic bone disease     Other emphysema 10/01/2019    Pericarditis     S/P kidney transplant 1992    x2 (1992 & 2005),    Thrombocytopenia     Thyroid disease     Tobacco use 09/05/2014       PAST SURGICAL HISTORY:  Past Surgical History:   Procedure Laterality Date    AV FISTULA PLACEMENT Left 12/18/2023    Procedure: CREATION, AV FISTULA;  Surgeon: JUANI Melendez III, MD;  Location: [facility] OR [address] [place] [address];  Service:  "Vascular;  Laterality: Left;  LUE AVF creation vs AVG insertion    CARDIOVERSION  04/30/15    CHOLECYSTECTOMY      COLONOSCOPY  April 20, 2011    Diverticulosis, repeat recommended in 3 yrs., repeat colonoscopy 2014 revealed 2 polyps.  He should return in 5 years.    COLONOSCOPY N/A 3/13/2020    Procedure: COLONOSCOPY;  Surgeon: Chon Casper MD;  Location: [facility] ENDO (4TH FLR);  Service: Endoscopy;  Laterality: N/A;  ok to hold coumadin x5days-see telephone encounter 2/4/20-tb    COLONOSCOPY N/A 2/4/2021    Procedure: COLONOSCOPY;  Surgeon: Chon Casper MD;  Location: [facility] (4TH FLR);  Service: Endoscopy;  Laterality: N/A;  Eliquis - per Dr. GAVIN Blunt ok to hold x 2 days and "restarted asap"- ERW  last prep "poor", [id number] ordered/ Pt requests Dr. Casper  prep ins. mailed - COVID screening on 2/1/21 PCW- ERW    COLONOSCOPY N/A 3/3/2021    Procedure: COLONOSCOPY;  Surgeon: Chon Casper MD;  Location: [facility] (4TH FLR);  Service: Endoscopy;  Laterality: N/A;  Patient with his second poor bowel pre and has poor prep today.  If patient not intersted or can't get colonoscopy tomorrow will need constipation bowel prep and will need to restart his Eliquis today.Thanks,Chon Blunt-ok to hold Eliquis 2 days prior      COVID     CORONARY ANGIOGRAPHY N/A 2/28/2024    Procedure: ANGIOGRAM, CORONARY ARTERY;  Surgeon: Tylor Lincoln MD;  Location: [facility] CATH LAB;  Service: Cardiology;  Laterality: N/A;    CORONARY ANGIOPLASTY WITH STENT PLACEMENT  9/13/2006    FISTULOGRAM N/A 2/19/2024    Procedure: Fistulogram;  Surgeon: JUANI Melendez III, MD;  Location: [facility] CATH LAB;  Service: Peripheral Vascular;  Laterality: N/A;    FISTULOGRAM, WITH PTA Left 6/3/2024    Procedure: FISTULOGRAM, WITH PTA;  Surgeon: JUANI Melendez III, MD;  Location: [facility] OR 2ND FLR;  Service: Vascular;  Laterality: Left;  mGy: 51.58  GyCm2: 6.8285  Fluoro time: 5.1 min  Contrast: 28 cc    IRRIGATION AND DEBRIDEMENT Right " 8/30/2023    Procedure: IRRIGATION AND DEBRIDEMENT, RIGHT MIDDLE FINGER;  Surgeon: Martin Larsen MD;  Location: [facility] OR [address] [place] [address];  Service: Orthopedics;  Laterality: Right;    KIDNEY TRANSPLANT  2005    LEFT HEART CATHETERIZATION N/A 2/26/2024    Procedure: Left heart cath;  Surgeon: Tylor Lincoln MD;  Location: [facility] CATH LAB;  Service: Cardiology;  Laterality: N/A;    PARATHYROIDECTOMY      PERCUTANEOUS TRANSLUMINAL ANGIOPLASTY OF ARTERIOVENOUS FISTULA Left 2/19/2024    Procedure: PTA, AV FISTULA;  Surgeon: JUANI Melendez III, MD;  Location: [facility] CATH LAB;  Service: Peripheral Vascular;  Laterality: Left;    STENT, DRUG ELUTING, SINGLE VESSEL, CORONARY N/A 2/28/2024    Procedure: Stent, Drug Eluting, Single Vessel, Coronary;  Surgeon: Tylor Lincoln MD;  Location: [facility] CATH LAB;  Service: Cardiology;  Laterality: N/A;    TREATMENT OF CARDIAC ARRHYTHMIA N/A 3/28/2022    Procedure: CARDIOVERSION;  Surgeon: Migue Blunt MD;  Location: [facility] EP LAB;  Service: Cardiology;  Laterality: N/A;  AF, DCC, MAC, GP, 3 PREP*RODRIGO deferred pt 100% compliant*    VENOPLASTY  6/3/2024    Procedure: ANGIOPLASTY, VEIN;  Surgeon: JUANI Melendez III, MD;  Location: [facility] OR [address] [place] [address];  Service: Vascular;;  Left subclavian       FAMILY HISTORY:  Family History   Problem Relation Name Age of Onset    No Known Problems Sister      No Known Problems Brother      Thyroid disease Mother          s/p surgery    Heart disease Father          had pacemaker    No Known Problems Sister      Kidney failure Sister      Kidney disease Sister      No Known Problems Sister      Kidney disease Brother      Kidney failure Brother          s/p transplant    Diabetes Mellitus Neg Hx      Stroke Neg Hx      Heart attack Neg Hx      Cancer Neg Hx      Celiac disease Neg Hx      Cirrhosis Neg Hx      Colon cancer Neg Hx      Esophageal cancer Neg Hx      Inflammatory bowel disease Neg Hx      Rectal cancer Neg Hx      Stomach cancer Neg  Hx      Ulcerative colitis Neg Hx      Liver disease Neg Hx      Liver cancer Neg Hx      Crohn's disease Neg Hx      Melanoma Neg Hx         ALLERGIES AND MEDICATIONS: updated and reviewed.  Review of patient's allergies indicates:   Allergen Reactions    Ace inhibitors Swelling    Verapamil Other (See Comments)     Other reaction(s): Unknown    Povidone-iodine Itching     Current Outpatient Medications   Medication Sig Dispense Refill    acetaminophen (TYLENOL) 500 MG tablet Take 1 tablet (500 mg total) by mouth every 6 (six) hours as needed for Pain. 20 tablet 0    albuterol (VENTOLIN HFA) 90 mcg/actuation inhaler Inhale 2 puffs into the lungs every 6 (six) hours as needed for Wheezing or Shortness of Breath. Rescue 18 g 3    albuterol-ipratropium (DUO-NEB) 2.5 mg-0.5 mg/3 mL nebulizer solution Take 3 mLs by nebulization every 4 (four) hours as needed for Wheezing. Rescue 75 mL 0    amiodarone (PACERONE) 200 MG Tab TAKE 1 TABLET BY MOUTH EVERY DAY 90 tablet 3    atorvastatin (LIPITOR) 80 MG tablet Take 1 tablet (80 mg total) by mouth once daily. 90 tablet 3    b complex vitamins (B COMPLEX-VITAMIN B12) tablet Take 1 tablet by mouth once daily. 90 tablet 3    calcium carbonate (CALCIUM 600 ORAL) Take 1 tablet by mouth 2 (two) times a day.      CHOLECALCIFEROL, VITAMIN D3, ORAL Take 2 tablets by mouth 2 (two) times daily.      clopidogreL (PLAVIX) 75 mg tablet Take 1 tablet (75 mg total) by mouth once daily. 90 tablet 3    doxazosin (CARDURA) 4 MG tablet Take 1 tablet (4 mg total) by mouth every 12 (twelve) hours. 180 tablet 3    ELIQUIS 5 mg Tab TAKE 1 TABLET BY MOUTH TWICE A DAY 60 tablet 32    famotidine (PEPCID) 20 MG tablet Take 1 tablet (20 mg total) by mouth once daily. On dialysis days, please take this medicine after dialysis. 30 tablet 3    fexofenadine HCl (ALLEGRA ORAL) Take 1 tablet by mouth daily as needed (allergies).      fluticasone furoate-vilanteroL (BREO) 100-25 mcg/dose diskus inhaler Inhale 1  puff into the lungs once daily. Controller. Use this inhaler every day.      fluticasone propionate (FLONASE) 50 mcg/actuation nasal spray 1 spray (50 mcg total) by Each Nostril route daily as needed for Allergies.      furosemide (LASIX) 20 MG tablet Take 4 tablets (80 mg total) by mouth 2 (two) times daily as needed. For worsening LE swelling or shortness of breath on non-dialysis days      gabapentin (NEURONTIN) 100 MG capsule Take 1 capsule (100 mg total) by mouth as needed (pain).      hydrALAZINE (APRESOLINE) 50 MG tablet Take 1 tablet (50 mg total) by mouth 3 (three) times daily. Hold until follow up with PCP or cardiologist 270 tablet 3    metoprolol succinate (TOPROL-XL) 25 MG 24 hr tablet Take 0.5 tablets (12.5 mg total) by mouth once daily. 45 tablet 3    multivitamin (THERAGRAN) per tablet Take 1 tablet by mouth Daily.      NIFEdipine (PROCARDIA-XL) 60 MG (OSM) 24 hr tablet Take 1 tablet (60 mg total) by mouth 2 (two) times a day. Hold until follow up with PCP or cardiologist 180 tablet 3    nitroGLYCERIN (NITROSTAT) 0.4 MG SL tablet Place 1 tablet (0.4 mg total) under the tongue every 5 (five) minutes as needed for Chest pain. 25 tablet 0    omeprazole (PRILOSEC) 20 MG capsule Take 1 capsule (20 mg total) by mouth daily as needed (heartburn).      oxyCODONE-acetaminophen (PERCOCET) 5-325 mg per tablet Take 1 tablet by mouth every 6 (six) hours as needed for Pain. 12 tablet 0    paricalcitoL (ZEMPLAR) 1 MCG capsule TAKE 1 CAPSULE ON MONDAY, WEDNESDAY AND FRIDAY 36 capsule 3    predniSONE (DELTASONE) 5 MG tablet Take 1 tablet (5 mg total) by mouth once daily. 90 tablet 3    pulse oximeter (PULSE OXIMETER) device by Apply Externally route 2 (two) times a day. Use twice daily at 8 AM and 3 PM and record the value in Pick[employer]hart as directed. 1 each 0    sevelamer carbonate (RENVELA) 800 mg Tab Take 1 tablet (800 mg total) by mouth 3 (three) times daily with meals. (Patient not taking: Reported on 5/29/2024) 90  tablet 11    suvorexant (BELSOMRA) 5 mg Tab Take 5 mg by mouth nightly as needed (insomnia). 30 tablet 2    tacrolimus (PROGRAF) 1 MG Cap Take 3 capsules (3 mg total) by mouth every 12 (twelve) hours for 60 days, THEN 2 capsules (2 mg total) every 12 (twelve) hours for 60 days, THEN 1 capsule (1 mg total) every 12 (twelve) hours for 60 days, THEN 1 capsule (1 mg total) once daily. 540 capsule 4    tiotropium bromide (SPIRIVA RESPIMAT) 1.25 mcg/actuation inhaler Inhale 2 puffs into the lungs once daily. Controller. Use this inhaler every day. 4 g 11    traMADoL (ULTRAM) 50 mg tablet Take 1 tablet (50 mg total) by mouth every 6 (six) hours as needed for Pain. 20 tablet 0     No current facility-administered medications for this visit.       ROS  Review of Systems   Constitutional:  Negative for unexpected weight change.   HENT:  Negative for hearing loss, rhinorrhea and trouble swallowing.    Eyes:  Positive for visual disturbance (2/2 cataracts). Negative for discharge.   Respiratory:  Negative for chest tightness and wheezing.    Cardiovascular:  Negative for chest pain and palpitations.   Gastrointestinal:  Negative for blood in stool, constipation, diarrhea and vomiting.   Endocrine: Negative for polydipsia and polyuria.   Genitourinary:  Negative for difficulty urinating, hematuria and urgency.   Musculoskeletal:  Negative for arthralgias, joint swelling and neck pain.   Allergic/Immunologic: Positive for immunocompromised state.   Neurological:  Negative for headaches.   Psychiatric/Behavioral:  Negative for dysphoric mood.          OBJECTIVE     Physical Exam  Vitals:    08/29/24 0915   BP: (!) 152/70   Pulse: (!) 55    Body mass index is 23.47 kg/m².            Physical Exam  Vitals reviewed.   Constitutional:       General: He is not in acute distress.     Appearance: Normal appearance.   HENT:      Head: Normocephalic and atraumatic.      Mouth/Throat:      Mouth: Mucous membranes are moist.      Pharynx:  Oropharynx is clear.   Eyes:      Extraocular Movements: Extraocular movements intact.      Conjunctiva/sclera: Conjunctivae normal.      Pupils: Pupils are equal, round, and reactive to light.   Cardiovascular:      Rate and Rhythm: Normal rate and regular rhythm.      Pulses: Normal pulses.      Heart sounds: Normal heart sounds.   Pulmonary:      Effort: Pulmonary effort is normal.      Breath sounds: Normal breath sounds.   Abdominal:      General: There is no distension.   Musculoskeletal:         General: Normal range of motion.      Cervical back: Normal range of motion and neck supple.   Skin:     General: Skin is warm and dry.   Neurological:      General: No focal deficit present.      Mental Status: He is alert.           Health Maintenance         Date Due Completion Date    Colorectal Cancer Screening 03/03/2023 3/3/2021    COVID-19 Vaccine (7 - 2023-24 season) 05/05/2024 1/5/2024    Influenza Vaccine (1) 09/01/2024 9/7/2023    LDCT Lung Screen 10/13/2024 10/13/2023    PROSTATE-SPECIFIC ANTIGEN 02/06/2025 2/6/2024    Hemoglobin A1c (Prediabetes) 06/11/2025 6/11/2024    TETANUS VACCINE 03/12/2028 3/12/2018    Lipid Panel 06/11/2029 6/11/2024              ASSESSMENT     68 y.o. male with     1. Annual physical exam    2. ESRD (end stage renal disease) on dialysis    3. H/O kidney transplant    4. Primary hypertension    5. Mixed hyperlipidemia    6. Coronary artery disease involving native coronary artery of native heart without angina pectoris    7. Paroxysmal atrial fibrillation    8. Scrotal abscess        PLAN:     1. Annual physical exam  - Discussed age and gender appropriate screenings at this visit and encouraged a healthy diet low in simple carbohydrates, and increased physical activity.  Counseled on medically appropriate vaccines based on age and current health condition.  Screening test reviewed and discussed with patient.     2. ESRD (end stage renal disease) on dialysis  - Stable on current  regimen. Continue follow up with Nephrology.     3. H/O kidney transplant  - Now on HD. Maintained on Tacrolimus. Continue.     4. Primary hypertension  - Mildly elevated today due to needing dialysis. Continue to monitor at home. Continue current regimen.     5. Mixed hyperlipidemia  - Stable on current regimen, continue.    6. Coronary artery disease involving native coronary artery of native heart without angina pectoris  - Stable on current regimen, continue follow up with Cardiology.     7. Paroxysmal atrial fibrillation  - RRR on exam today. Continue follow up with EP.     8. Scrotal abscess  - No symptoms after Flagyl course. Follow up with Urology as needed.         RTC in 6 months, earlier PRN       Anatoliy Colindres MD  Family Medicine  [facility]  08/29/2024    This document was created using voice recognition software ([employer] Direct). Although it may be edited, this document may contain errors related to incorrect recognition of the spoken word. Please call the physician if clarification is needed.       No follow-ups on file.

## 2024-08-29 NOTE — PATIENT INSTRUCTIONS
Please set up an appointment with the Endoscopy  to schedule your Colonoscopy. You are due for recheck.

## 2024-09-03 ENCOUNTER — PATIENT MESSAGE (OUTPATIENT)
Dept: NEPHROLOGY | Facility: CLINIC | Age: 69
End: 2024-09-03
Payer: MEDICARE

## 2024-09-04 ENCOUNTER — OFFICE VISIT (OUTPATIENT)
Dept: ORTHOPEDICS | Facility: CLINIC | Age: 69
End: 2024-09-04
Payer: MEDICARE

## 2024-09-04 VITALS
HEART RATE: 55 BPM | WEIGHT: 172.06 LBS | BODY MASS INDEX: 22.7 KG/M2 | SYSTOLIC BLOOD PRESSURE: 163 MMHG | DIASTOLIC BLOOD PRESSURE: 77 MMHG

## 2024-09-04 DIAGNOSIS — M19.021 OSTEOARTHRITIS OF RIGHT ELBOW, UNSPECIFIED OSTEOARTHRITIS TYPE: Primary | ICD-10-CM

## 2024-09-04 PROCEDURE — 3077F SYST BP >= 140 MM HG: CPT | Mod: HCNC,CPTII,S$GLB, | Performed by: STUDENT IN AN ORGANIZED HEALTH CARE EDUCATION/TRAINING PROGRAM

## 2024-09-04 PROCEDURE — 3044F HG A1C LEVEL LT 7.0%: CPT | Mod: HCNC,CPTII,S$GLB, | Performed by: STUDENT IN AN ORGANIZED HEALTH CARE EDUCATION/TRAINING PROGRAM

## 2024-09-04 PROCEDURE — 3066F NEPHROPATHY DOC TX: CPT | Mod: HCNC,CPTII,S$GLB, | Performed by: STUDENT IN AN ORGANIZED HEALTH CARE EDUCATION/TRAINING PROGRAM

## 2024-09-04 PROCEDURE — 1160F RVW MEDS BY RX/DR IN RCRD: CPT | Mod: HCNC,CPTII,S$GLB, | Performed by: STUDENT IN AN ORGANIZED HEALTH CARE EDUCATION/TRAINING PROGRAM

## 2024-09-04 PROCEDURE — 1126F AMNT PAIN NOTED NONE PRSNT: CPT | Mod: HCNC,CPTII,S$GLB, | Performed by: STUDENT IN AN ORGANIZED HEALTH CARE EDUCATION/TRAINING PROGRAM

## 2024-09-04 PROCEDURE — 3078F DIAST BP <80 MM HG: CPT | Mod: HCNC,CPTII,S$GLB, | Performed by: STUDENT IN AN ORGANIZED HEALTH CARE EDUCATION/TRAINING PROGRAM

## 2024-09-04 PROCEDURE — 3288F FALL RISK ASSESSMENT DOCD: CPT | Mod: HCNC,CPTII,S$GLB, | Performed by: STUDENT IN AN ORGANIZED HEALTH CARE EDUCATION/TRAINING PROGRAM

## 2024-09-04 PROCEDURE — 99999 PR PBB SHADOW E&M-EST. PATIENT-LVL IV: CPT | Mod: PBBFAC,HCNC,, | Performed by: STUDENT IN AN ORGANIZED HEALTH CARE EDUCATION/TRAINING PROGRAM

## 2024-09-04 PROCEDURE — 1159F MED LIST DOCD IN RCRD: CPT | Mod: HCNC,CPTII,S$GLB, | Performed by: STUDENT IN AN ORGANIZED HEALTH CARE EDUCATION/TRAINING PROGRAM

## 2024-09-04 PROCEDURE — 3008F BODY MASS INDEX DOCD: CPT | Mod: HCNC,CPTII,S$GLB, | Performed by: STUDENT IN AN ORGANIZED HEALTH CARE EDUCATION/TRAINING PROGRAM

## 2024-09-04 PROCEDURE — 1101F PT FALLS ASSESS-DOCD LE1/YR: CPT | Mod: HCNC,CPTII,S$GLB, | Performed by: STUDENT IN AN ORGANIZED HEALTH CARE EDUCATION/TRAINING PROGRAM

## 2024-09-04 PROCEDURE — 99213 OFFICE O/P EST LOW 20 MIN: CPT | Mod: HCNC,S$GLB,, | Performed by: STUDENT IN AN ORGANIZED HEALTH CARE EDUCATION/TRAINING PROGRAM

## 2024-09-04 NOTE — PROGRESS NOTES
CC: right elbow pain    68 y.o. Male presents today for follow up evaluation of his right elbow pain following CSI. Pt reports no pain in the elbow today. Pt denies mechanical symptoms. Pt notes tingling into his right hand when he is walking (when hand is hanging down by his side).      Attempted treatments: elbow joint CSI 5/29/24  Pain score: 0/10  History of trauma/injury: none since last visit  Affecting ADLs: no      REVIEW OF SYSTEMS:   Constitution: Patient denies fever or chills.  Eyes: Patient denies eye pain or vision changes.  HEENT: Patient denies ear pain, sore throat, or nasal discharge.  CVS: Patient denies chest pain.  Lungs: Patient denies shortness of breath or cough.  Skin: Patient denies skin rash or itching.    Musculoskeletal: Patient denies recent falls. See HPI.  Psych: Patient denies any current anxiety or nervousness.    PAST MEDICAL HISTORY:   Past Medical History:   Diagnosis Date    Atrial fibrillation     CAD (coronary artery disease) 2006    MI in 2006    CHF (congestive heart failure) 2017    CKD (chronic kidney disease) stage 3, GFR 30-59 ml/min     Dr. Latif.  in transplanted kidney    CKD (chronic kidney disease) stage 5, GFR less than 15 ml/min 02/02/2024    COVID-19 02/07/2023    Diverticulosis     Hyperlipidemia     Hypertension     Keloid cicatrix     Metabolic bone disease     Other emphysema 10/01/2019    Pericarditis     S/P kidney transplant 1992    x2 (1992 & 2005),    Thrombocytopenia     Thyroid disease     Tobacco use 09/05/2014       MEDICATIONS:     Current Outpatient Medications:     acetaminophen (TYLENOL) 500 MG tablet, Take 1 tablet (500 mg total) by mouth every 6 (six) hours as needed for Pain., Disp: 20 tablet, Rfl: 0    albuterol (VENTOLIN HFA) 90 mcg/actuation inhaler, Inhale 2 puffs into the lungs every 6 (six) hours as needed for Wheezing or Shortness of Breath. Rescue, Disp: 18 g, Rfl: 3    albuterol-ipratropium (DUO-NEB) 2.5 mg-0.5 mg/3 mL nebulizer  solution, Take 3 mLs by nebulization every 4 (four) hours as needed for Wheezing. Rescue, Disp: 75 mL, Rfl: 0    amiodarone (PACERONE) 200 MG Tab, TAKE 1 TABLET BY MOUTH EVERY DAY, Disp: 90 tablet, Rfl: 3    atorvastatin (LIPITOR) 80 MG tablet, Take 1 tablet (80 mg total) by mouth once daily., Disp: 90 tablet, Rfl: 3    b complex vitamins (B COMPLEX-VITAMIN B12) tablet, Take 1 tablet by mouth once daily., Disp: 90 tablet, Rfl: 3    calcium carbonate (CALCIUM 600 ORAL), Take 1 tablet by mouth 2 (two) times a day., Disp: , Rfl:     CHOLECALCIFEROL, VITAMIN D3, ORAL, Take 2 tablets by mouth 2 (two) times daily., Disp: , Rfl:     clopidogreL (PLAVIX) 75 mg tablet, Take 1 tablet (75 mg total) by mouth once daily., Disp: 90 tablet, Rfl: 3    doxazosin (CARDURA) 4 MG tablet, Take 1 tablet (4 mg total) by mouth every 12 (twelve) hours., Disp: 180 tablet, Rfl: 3    ELIQUIS 5 mg Tab, TAKE 1 TABLET BY MOUTH TWICE A DAY, Disp: 60 tablet, Rfl: 32    famotidine (PEPCID) 20 MG tablet, Take 1 tablet (20 mg total) by mouth once daily. On dialysis days, please take this medicine after dialysis., Disp: 30 tablet, Rfl: 3    fexofenadine HCl (ALLEGRA ORAL), Take 1 tablet by mouth daily as needed (allergies)., Disp: , Rfl:     fluticasone furoate-vilanteroL (BREO) 100-25 mcg/dose diskus inhaler, Inhale 1 puff into the lungs once daily. Controller. Use this inhaler every day., Disp: , Rfl:     fluticasone propionate (FLONASE) 50 mcg/actuation nasal spray, 1 spray (50 mcg total) by Each Nostril route daily as needed for Allergies., Disp: , Rfl:     gabapentin (NEURONTIN) 100 MG capsule, Take 1 capsule (100 mg total) by mouth as needed (pain)., Disp: , Rfl:     metoprolol succinate (TOPROL-XL) 25 MG 24 hr tablet, Take 0.5 tablets (12.5 mg total) by mouth once daily., Disp: 45 tablet, Rfl: 3    multivitamin (THERAGRAN) per tablet, Take 1 tablet by mouth Daily., Disp: , Rfl:     nitroGLYCERIN (NITROSTAT) 0.4 MG SL tablet, Place 1 tablet (0.4  mg total) under the tongue every 5 (five) minutes as needed for Chest pain., Disp: 25 tablet, Rfl: 0    omeprazole (PRILOSEC) 20 MG capsule, Take 1 capsule (20 mg total) by mouth daily as needed (heartburn)., Disp: , Rfl:     predniSONE (DELTASONE) 5 MG tablet, Take 1 tablet (5 mg total) by mouth once daily., Disp: 90 tablet, Rfl: 3    pulse oximeter (PULSE OXIMETER) device, by Apply Externally route 2 (two) times a day. Use twice daily at 8 AM and 3 PM and record the value in MyChart as directed., Disp: 1 each, Rfl: 0    tacrolimus (PROGRAF) 1 MG Cap, Take 3 capsules (3 mg total) by mouth every 12 (twelve) hours for 60 days, THEN 2 capsules (2 mg total) every 12 (twelve) hours for 60 days, THEN 1 capsule (1 mg total) every 12 (twelve) hours for 60 days, THEN 1 capsule (1 mg total) once daily., Disp: 540 capsule, Rfl: 4    tiotropium bromide (SPIRIVA RESPIMAT) 1.25 mcg/actuation inhaler, Inhale 2 puffs into the lungs once daily. Controller. Use this inhaler every day., Disp: 4 g, Rfl: 11    traMADoL (ULTRAM) 50 mg tablet, Take 1 tablet (50 mg total) by mouth every 6 (six) hours as needed for Pain., Disp: 20 tablet, Rfl: 0    NIFEdipine (PROCARDIA-XL) 60 MG (OSM) 24 hr tablet, Take 1 tablet (60 mg total) by mouth 2 (two) times a day. Hold until follow up with PCP or cardiologist, Disp: 180 tablet, Rfl: 3    ALLERGIES:   Review of patient's allergies indicates:   Allergen Reactions    Ace inhibitors Swelling    Verapamil Other (See Comments)     Other reaction(s): Unknown    Povidone-iodine Itching        PHYSICAL EXAMINATION:  BP (!) 163/77   Pulse (!) 55   Wt 78 kg (172 lb 1.1 oz)   BMI 22.70 kg/m²   Vitals signs and nursing note have been reviewed.    General: In no acute distress, well developed, well nourished, no diaphoresis  Eyes: EOM full and smooth, no eye redness or discharge  HENT: normocephalic and atraumatic, neck supple, trachea midline  Cardiovascular: no LE edema  Lungs: respirations non-labored, no  conversational dyspnea   Neuro: AAOx3, CN2-12 grossly intact  Skin: No rashes, warm and dry  Psychiatric: cooperative, pleasant, mood and affect appropriate for age    Right Elbow   Inspection/Palpation:    -Deformity   -Ecchymosis   -Swelling   -[place] sign     TTP:   -med epicondyle   -lat epicondyle   -olecranon   -prox radius   -ext bundle   -flexor bundle    Special:    -Hook test    -Tinel's at ulnar tunnel    -Tinel's at Cubital Tunnel   -Froment's sign   -Jannet sign   -[place] sign   -[place]'s sign   -Maykel sign    ROM:    FROM in flexion, extension, supination, pronation    No pain with resisted flexion, extension, supination, pronation     Strength (* = with pain):    5/5 flex   5/5 ext   5/5 sup   5/5 pronation     Sensation intact in dists:   median n   radial n   ulnar n   all fingertips     Circ:   WWP   Radial pulse 2+    ASSESSMENT:      ICD-10-CM ICD-9-CM   1. Osteoarthritis of right elbow, unspecified osteoarthritis type  M19.021 715.92         PLAN:  Patient much improved since receiving right elbow joint CSI.  Patient to contact our clinic if and when pain returns.  At that time we can repeat injection.    All questions were answered to the best of my ability and all concerns were addressed at this time.    Follow up for above, or sooner if needed.      This note is dictated using the M*[employer] Direct word recognition program. There are word recognition mistakes that are occasionally missed on review.

## 2024-09-06 DIAGNOSIS — N18.6 ESRD (END STAGE RENAL DISEASE) ON DIALYSIS: ICD-10-CM

## 2024-09-06 DIAGNOSIS — K21.9 GASTROESOPHAGEAL REFLUX DISEASE, UNSPECIFIED WHETHER ESOPHAGITIS PRESENT: ICD-10-CM

## 2024-09-06 DIAGNOSIS — Z99.2 ESRD (END STAGE RENAL DISEASE) ON DIALYSIS: ICD-10-CM

## 2024-09-09 RX ORDER — FAMOTIDINE 20 MG/1
20 TABLET, FILM COATED ORAL DAILY
Qty: 90 TABLET | Refills: 1 | Status: SHIPPED | OUTPATIENT
Start: 2024-09-09 | End: 2025-09-09

## 2024-09-10 ENCOUNTER — PATIENT MESSAGE (OUTPATIENT)
Dept: NEPHROLOGY | Facility: CLINIC | Age: 69
End: 2024-09-10
Payer: MEDICARE

## 2024-09-10 DIAGNOSIS — I48.0 PAROXYSMAL ATRIAL FIBRILLATION: Primary | Chronic | ICD-10-CM

## 2024-09-11 DIAGNOSIS — I13.0 HYPERTENSIVE HEART AND RENAL DISEASE WITH RENAL FAILURE, STAGE 1 THROUGH STAGE 4 OR UNSPECIFIED CHRONIC KIDNEY DISEASE, WITH HEART FAILURE: Primary | ICD-10-CM

## 2024-09-11 RX ORDER — NIFEDIPINE 30 MG/1
30 TABLET, EXTENDED RELEASE ORAL 2 TIMES DAILY
Qty: 180 TABLET | Refills: 3 | Status: SHIPPED | OUTPATIENT
Start: 2024-09-11 | End: 2025-09-11

## 2024-09-12 ENCOUNTER — OFFICE VISIT (OUTPATIENT)
Dept: ELECTROPHYSIOLOGY | Facility: CLINIC | Age: 69
End: 2024-09-12
Payer: MEDICARE

## 2024-09-12 VITALS
DIASTOLIC BLOOD PRESSURE: 70 MMHG | SYSTOLIC BLOOD PRESSURE: 122 MMHG | BODY MASS INDEX: 23.37 KG/M2 | WEIGHT: 176.38 LBS | HEIGHT: 73 IN | HEART RATE: 57 BPM

## 2024-09-12 DIAGNOSIS — E03.9 HYPOTHYROIDISM, UNSPECIFIED TYPE: ICD-10-CM

## 2024-09-12 DIAGNOSIS — I48.0 PAROXYSMAL ATRIAL FIBRILLATION: ICD-10-CM

## 2024-09-12 DIAGNOSIS — I25.10 CORONARY ARTERY DISEASE INVOLVING NATIVE CORONARY ARTERY OF NATIVE HEART WITHOUT ANGINA PECTORIS: ICD-10-CM

## 2024-09-12 DIAGNOSIS — N25.81 SECONDARY RENAL HYPERPARATHYROIDISM: ICD-10-CM

## 2024-09-12 DIAGNOSIS — Z95.5 HISTORY OF CORONARY ANGIOPLASTY WITH INSERTION OF STENT: ICD-10-CM

## 2024-09-12 DIAGNOSIS — N18.6 ESRD (END STAGE RENAL DISEASE): ICD-10-CM

## 2024-09-12 DIAGNOSIS — I50.22 HEART FAILURE WITH MILDLY REDUCED EJECTION FRACTION (HFMREF): ICD-10-CM

## 2024-09-12 DIAGNOSIS — I27.20 PULMONARY HYPERTENSION: ICD-10-CM

## 2024-09-12 DIAGNOSIS — J44.9 CHRONIC OBSTRUCTIVE PULMONARY DISEASE, UNSPECIFIED COPD TYPE: ICD-10-CM

## 2024-09-12 DIAGNOSIS — K21.9 GASTROESOPHAGEAL REFLUX DISEASE, UNSPECIFIED WHETHER ESOPHAGITIS PRESENT: ICD-10-CM

## 2024-09-12 DIAGNOSIS — I25.2 HISTORY OF NON-ST ELEVATION MYOCARDIAL INFARCTION (NSTEMI): ICD-10-CM

## 2024-09-12 DIAGNOSIS — D84.821 IMMUNODEFICIENCY DUE TO TREATMENT WITH IMMUNOSUPPRESSIVE MEDICATION: ICD-10-CM

## 2024-09-12 DIAGNOSIS — T86.12 FAILED KIDNEY TRANSPLANT: ICD-10-CM

## 2024-09-12 DIAGNOSIS — Z79.01 LONG TERM (CURRENT) USE OF ANTICOAGULANTS: ICD-10-CM

## 2024-09-12 DIAGNOSIS — Z94.0 KIDNEY REPLACED BY TRANSPLANT: ICD-10-CM

## 2024-09-12 DIAGNOSIS — I35.0 MILD AORTIC STENOSIS: ICD-10-CM

## 2024-09-12 DIAGNOSIS — I10 PRIMARY HYPERTENSION: ICD-10-CM

## 2024-09-12 DIAGNOSIS — R07.89 MUSCULOSKELETAL CHEST PAIN: ICD-10-CM

## 2024-09-12 DIAGNOSIS — R00.1 BRADYCARDIA: ICD-10-CM

## 2024-09-12 DIAGNOSIS — Z99.2 STAGE 5 CHRONIC KIDNEY DISEASE ON CHRONIC DIALYSIS: ICD-10-CM

## 2024-09-12 DIAGNOSIS — E78.2 MIXED HYPERLIPIDEMIA: ICD-10-CM

## 2024-09-12 DIAGNOSIS — Z94.0 H/O KIDNEY TRANSPLANT: ICD-10-CM

## 2024-09-12 DIAGNOSIS — Z79.899 IMMUNODEFICIENCY DUE TO TREATMENT WITH IMMUNOSUPPRESSIVE MEDICATION: ICD-10-CM

## 2024-09-12 DIAGNOSIS — N18.6 STAGE 5 CHRONIC KIDNEY DISEASE ON CHRONIC DIALYSIS: ICD-10-CM

## 2024-09-12 DIAGNOSIS — E74.39 GLUCOSE INTOLERANCE: ICD-10-CM

## 2024-09-12 DIAGNOSIS — I70.0 AORTIC ATHEROSCLEROSIS: ICD-10-CM

## 2024-09-12 DIAGNOSIS — Z87.891 HISTORY OF TOBACCO USE: ICD-10-CM

## 2024-09-12 DIAGNOSIS — I49.5 TACHYCARDIA-BRADYCARDIA SYNDROME: Primary | ICD-10-CM

## 2024-09-12 PROBLEM — I50.33 ACUTE ON CHRONIC DIASTOLIC HEART FAILURE: Status: RESOLVED | Noted: 2024-02-23 | Resolved: 2024-09-12

## 2024-09-12 PROBLEM — I50.32 CHRONIC HEART FAILURE WITH PRESERVED EJECTION FRACTION: Status: RESOLVED | Noted: 2023-03-17 | Resolved: 2024-09-12

## 2024-09-12 PROBLEM — I48.91 ATRIAL FIBRILLATION WITH RVR: Status: RESOLVED | Noted: 2024-04-09 | Resolved: 2024-09-12

## 2024-09-12 PROBLEM — R79.89 TROPONIN LEVEL ELEVATED: Status: RESOLVED | Noted: 2017-01-22 | Resolved: 2024-09-12

## 2024-09-12 PROBLEM — I20.0 UNSTABLE ANGINA PECTORIS: Status: RESOLVED | Noted: 2023-10-14 | Resolved: 2024-09-12

## 2024-09-12 PROBLEM — R06.6 HICCUPS: Status: RESOLVED | Noted: 2023-08-31 | Resolved: 2024-09-12

## 2024-09-12 LAB
OHS QRS DURATION: 104 MS
OHS QTC CALCULATION: 465 MS

## 2024-09-12 PROCEDURE — 93010 ELECTROCARDIOGRAM REPORT: CPT | Mod: HCNC,S$GLB,, | Performed by: INTERNAL MEDICINE

## 2024-09-12 PROCEDURE — 99999 PR PBB SHADOW E&M-EST. PATIENT-LVL IV: CPT | Mod: PBBFAC,HCNC,, | Performed by: STUDENT IN AN ORGANIZED HEALTH CARE EDUCATION/TRAINING PROGRAM

## 2024-09-12 NOTE — PROGRESS NOTES
Electrophysiology Clinic Note    Reason for follow-up patient visit: Ongoing evaluation and recommendations regarding tachycardia-bradycardia syndrome with periods of paroxysmal atrial fibrillation, s/p aborted cardioversion on 7/17/2023.    PRESENTING HISTORY:     History of Present Illness:  Mr. Ibrahima Phelps is a darshan 68-year-old gentleman who returns to clinic today for ongoing evaluation and recommendations regarding tachycardia-bradycardia syndrome with periods of paroxysmal atrial fibrillation, s/p aborted cardioversion on 7/17/2023. He has a past medical history significant for tachycardia-bradycardia syndrome, paroxysmal atrial fibrillation, heart failure with mildly reduced systolic function with most recent LVEF 45-50%, mild aortic stenosis, pulmonary hypertension with his most recent estimated PASP of 40mmHg, coronary artery disease with PCI in 2006 and a subsequent NSTEMI with PCI of the mid-RCA and prox-RCA on 2/28/2024, hypertension, hyperlipidemia, aortic atherosclerosis, glucose intolerance, end stage renal disease with a history of prior renal transplant x2 in 2002 and 2005 with chronic immunosuppressive medication - maintained on hemodialysis T/R/S, secondary hyperparathyroidism, diverticulosis, COPD, GERD, hypothyroidism, gout, a history of tobacco use, and obesity. He was previously scheduled to undergo a cardioversion on 7/1//2023; however, he presented in sinus rhythm on that day. He has been maintained in asymptomatic sinus bradycardia on amiodarone 200mg po daily.      He was previously followed in EP with Dr. Blunt in regards to his atrial fibrillation, but has transitioned his care into my clinic. He was previously diagnosed with paroxysmal atrial fibrillation with RVR and underwent a successful cardioversion on 3/28/2022. He was the initiated on antiarrhythmic therapy with flecainide 50mg po BID - this dose was decreased to the 50mg po BID dosing in the setting of reported fatigue and  "periods of bradycardia. He has remained on oral anticoagulation with apixaban 5mg po BID with no adverse bleeding events reported. He previously reported an episode of musculoskeletal chest pain with arm movement of the left arm that lasted approximately 5 seconds before spontaneously resolving. He has additionally reported episodes of "blacking out" and cognitive decline, and is being closely evaluated with neurology with Dr. Brunson. On a subsequent 14-day ambulatory event monitor, he was evidenced to have periods of frequent PVCs with an overall PVC burden of 4.6%, and multiple episodes of paroxysmal atrial fibrillation, at times with RVR. The longest event of pAF lasted 1 day and 9 hours, with an overall burden of pAF of 10%. We scheduled a cardioversion on 7/11/2023; however, this was aborted when he presented in sinus rhythm. His flecainide was discontinued, and he was started on amiodarone 200mg po daily. He reports excellent control on this agent and denies any symptoms of palpitations or racing heart rates. Of note, his resting heart rates have remained in the upper 50s to low 60s, improved from when he was on flecainide therapy. Recently he was seen by his dialysis nephrologist, Dr. Gambino, and was noted to have periods of asymptomatic bradycardia during his dialysis sessions. His metoprolol succinate 12.5mg po daily was discontinued with improvement in his HR to 60bpm on assessment today. He denies any symptoms during his periods of asymptomatic bradycardia.        Mr. Phelps presents to clinic today with his wife. In discussion with Mr. Phelps today, he tells me that he is feeling overall well. On ECG evaluation, he remains in asymptomatic sinus bradycardia and sinus rhythm, with HRs of 57-60bpm. He denies any recent episodes of dizziness, lightheadedness, syncope, or presyncope. He has previously experienced a syncopal episode where he was noted to have fallen asleep on the couch with his wife unable to " "awaken him, with witnessed jerking movements. He denies any recurrent chest pain or chest discomfort, nausea or vomiting, orthopnea, or PND. He occasionally feels "skipped beats", and can tell when he is in atrial fibrillation with symptoms of palpitations and racing heart rates. He reports that occasionally he has lower extremity edema, and has remained compliant with his home lasix dose. He has not been weighing himself, but he was encouraged to frequently weigh himself in an effort to monitor his fluid status, and was recently evaluated by his nephrology team. He continues to wear compression stockings and reports that this is helpful in preventing dependent edema. He reports baseline mild shortness of breath and dyspnea with exertion. He can climb one flight of stairs, but is limited based on hip, knee, and back osteoarthritis.     Review of Systems:  Review of Systems   Constitutional:  Positive for activity change.        Mild exercise intolerance.   HENT:  Negative for nasal congestion, nosebleeds, postnasal drip, rhinorrhea, sinus pressure/congestion, sneezing and sore throat.    Respiratory:  Positive for shortness of breath. Negative for apnea, cough, chest tightness and wheezing.    Cardiovascular:  Negative for chest pain, palpitations and leg swelling.   Gastrointestinal:  Positive for constipation. Negative for abdominal distention, abdominal pain, blood in stool, change in bowel habit, diarrhea, nausea and vomiting.   Genitourinary:  Negative for dysuria and hematuria.   Musculoskeletal:  Positive for arthralgias and back pain. Negative for gait problem.   Neurological:  Positive for memory loss. Negative for dizziness, seizures, syncope, weakness, light-headedness, headaches and coordination difficulties.        PAST HISTORY:     Past Medical History:   Diagnosis Date    Atrial fibrillation     CAD (coronary artery disease) 2006    MI in 2006    CHF (congestive heart failure) 2017    CKD (chronic " "kidney disease) stage 3, GFR 30-59 ml/min     Dr. Latif.  in transplanted kidney    CKD (chronic kidney disease) stage 5, GFR less than 15 ml/min 02/02/2024    COVID-19 02/07/2023    Diverticulosis     Hyperlipidemia     Hypertension     Keloid cicatrix     Metabolic bone disease     Other emphysema 10/01/2019    Pericarditis     S/P kidney transplant 1992    x2 (1992 & 2005),    Thrombocytopenia     Thyroid disease     Tobacco use 09/05/2014       Past Surgical History:   Procedure Laterality Date    AV FISTULA PLACEMENT Left 12/18/2023    Procedure: CREATION, AV FISTULA;  Surgeon: JUANI Melendez III, MD;  Location: [facility] OR 2ND FLR;  Service: Vascular;  Laterality: Left;  LUE AVF creation vs AVG insertion    CARDIOVERSION  04/30/15    CHOLECYSTECTOMY      COLONOSCOPY  April 20, 2011    Diverticulosis, repeat recommended in 3 yrs., repeat colonoscopy 2014 revealed 2 polyps.  He should return in 5 years.    COLONOSCOPY N/A 3/13/2020    Procedure: COLONOSCOPY;  Surgeon: Chon Casper MD;  Location: [facility] (4TH FLR);  Service: Endoscopy;  Laterality: N/A;  ok to hold coumadin x5days-see telephone encounter 2/4/20-tb    COLONOSCOPY N/A 2/4/2021    Procedure: COLONOSCOPY;  Surgeon: Chon Casper MD;  Location: [facility] (4TH FLR);  Service: Endoscopy;  Laterality: N/A;  Eliquis - per Dr. GAVIN chua to hold x 2 days and "restarted asap"- ERW  last prep "poor", [id number] ordered/ Pt requests Dr. Casper  prep ins. mailed - COVID screening on 2/1/21 PCW- ERW    COLONOSCOPY N/A 3/3/2021    Procedure: COLONOSCOPY;  Surgeon: Chon Casper MD;  Location: [facility] (4TH FLR);  Service: Endoscopy;  Laterality: N/A;  Patient with his second poor bowel pre and has poor prep today.  If patient not intersted or can't get colonoscopy tomorrow will need constipation bowel prep and will need to restart his Eliquis today.Thanks,Chon     per Dr Blunt-ok to hold Eliquis 2 days prior      COVID     CORONARY ANGIOGRAPHY " N/A 2/28/2024    Procedure: ANGIOGRAM, CORONARY ARTERY;  Surgeon: Tylor Lincoln MD;  Location: [facility] CATH LAB;  Service: Cardiology;  Laterality: N/A;    CORONARY ANGIOPLASTY WITH STENT PLACEMENT  9/13/2006    FISTULOGRAM N/A 2/19/2024    Procedure: Fistulogram;  Surgeon: JUANI Mleendez III, MD;  Location: [facility] CATH LAB;  Service: Peripheral Vascular;  Laterality: N/A;    FISTULOGRAM, WITH PTA Left 6/3/2024    Procedure: FISTULOGRAM, WITH PTA;  Surgeon: JUANI Melendez III, MD;  Location: [facility] OR [facility] FLR;  Service: Vascular;  Laterality: Left;  mGy: 51.58  GyCm2: 6.8285  Fluoro time: 5.1 min  Contrast: 28 cc    IRRIGATION AND DEBRIDEMENT Right 8/30/2023    Procedure: IRRIGATION AND DEBRIDEMENT, RIGHT MIDDLE FINGER;  Surgeon: Martin Larsen MD;  [facility];  Service: Orthopedics;  Laterality: Right;    KIDNEY TRANSPLANT  2005    LEFT HEART CATHETERIZATION N/A 2/26/2024    Procedure: Left heart cath;  Surgeon: Tylor Lincoln MD;  Location: [facility] CATH LAB;  Service: Cardiology;  Laterality: N/A;    PARATHYROIDECTOMY      PERCUTANEOUS TRANSLUMINAL ANGIOPLASTY OF ARTERIOVENOUS FISTULA Left 2/19/2024    Procedure: PTA, AV FISTULA;  Surgeon: JUANI Melendez III, MD;  Location: [facility] CATH LAB;  Service: Peripheral Vascular;  Laterality: Left;    STENT, DRUG ELUTING, SINGLE VESSEL, CORONARY N/A 2/28/2024    Procedure: Stent, Drug Eluting, Single Vessel, Coronary;  Surgeon: Tylor Lincoln MD;  Location: [facility] CATH LAB;  Service: Cardiology;  Laterality: N/A;    TREATMENT OF CARDIAC ARRHYTHMIA N/A 3/28/2022    Procedure: CARDIOVERSION;  Surgeon: Migue Blunt MD;  Location: [facility] EP LAB;  Service: Cardiology;  Laterality: N/A;  AF, DCC, MAC, GP, 3 PREP*RODRIGO deferred pt 100% compliant*    VENOPLASTY  6/3/2024    Procedure: ANGIOPLASTY, VEIN;  Surgeon: JUANI Melendez III, MD;  Location: [facility] OR [facility] FLR;  Service: Vascular;;  Left subclavian       Family History:  Family History   Problem  Relation Name Age of Onset    No Known Problems Sister      No Known Problems Brother      Thyroid disease Mother          s/p surgery    Heart disease Father          had pacemaker    No Known Problems Sister      Kidney failure Sister      Kidney disease Sister      No Known Problems Sister      Kidney disease Brother      Kidney failure Brother          s/p transplant    Diabetes Mellitus Neg Hx      Stroke Neg Hx      Heart attack Neg Hx      Cancer Neg Hx      Celiac disease Neg Hx      Cirrhosis Neg Hx      Colon cancer Neg Hx      Esophageal cancer Neg Hx      Inflammatory bowel disease Neg Hx      Rectal cancer Neg Hx      Stomach cancer Neg Hx      Ulcerative colitis Neg Hx      Liver disease Neg Hx      Liver cancer Neg Hx      Crohn's disease Neg Hx      Melanoma Neg Hx         Social History:  He  reports that he has been smoking cigarettes. He started smoking about 42 years ago. He has a 20 pack-year smoking history. He has never used smokeless tobacco. He reports that he does not drink alcohol and does not use drugs.      MEDICATIONS & ALLERGIES:     Review of patient's allergies indicates:   Allergen Reactions    Ace inhibitors Swelling    Verapamil Other (See Comments)     Other reaction(s): Unknown    Povidone-iodine Itching       Current Outpatient Medications on File Prior to Visit   Medication Sig Dispense Refill    acetaminophen (TYLENOL) 500 MG tablet Take 1 tablet (500 mg total) by mouth every 6 (six) hours as needed for Pain. 20 tablet 0    albuterol (VENTOLIN HFA) 90 mcg/actuation inhaler Inhale 2 puffs into the lungs every 6 (six) hours as needed for Wheezing or Shortness of Breath. Rescue 18 g 3    albuterol-ipratropium (DUO-NEB) 2.5 mg-0.5 mg/3 mL nebulizer solution Take 3 mLs by nebulization every 4 (four) hours as needed for Wheezing. Rescue 75 mL 0    amiodarone (PACERONE) 200 MG Tab TAKE 1 TABLET BY MOUTH EVERY DAY 90 tablet 3    atorvastatin (LIPITOR) 80 MG tablet Take 1 tablet (80  mg total) by mouth once daily. 90 tablet 3    b complex vitamins (B COMPLEX-VITAMIN B12) tablet Take 1 tablet by mouth once daily. 90 tablet 3    calcium carbonate (CALCIUM 600 ORAL) Take 1 tablet by mouth 2 (two) times a day.      CHOLECALCIFEROL, VITAMIN D3, ORAL Take 2 tablets by mouth 2 (two) times daily.      clopidogreL (PLAVIX) 75 mg tablet Take 1 tablet (75 mg total) by mouth once daily. 90 tablet 3    doxazosin (CARDURA) 4 MG tablet Take 1 tablet (4 mg total) by mouth every 12 (twelve) hours. 180 tablet 3    ELIQUIS 5 mg Tab TAKE 1 TABLET BY MOUTH TWICE A DAY 60 tablet 32    famotidine (PEPCID) 20 MG tablet TAKE 1 TABLET (20 MG TOTAL) BY MOUTH ONCE DAILY. ON DIALYSIS DAYS, PLEASE TAKE THIS MEDICINE AFTER DIALYSIS. 90 tablet 1    fexofenadine HCl (ALLEGRA ORAL) Take 1 tablet by mouth daily as needed (allergies).      fluticasone furoate-vilanteroL (BREO) 100-25 mcg/dose diskus inhaler Inhale 1 puff into the lungs once daily. Controller. Use this inhaler every day.      fluticasone propionate (FLONASE) 50 mcg/actuation nasal spray 1 spray (50 mcg total) by Each Nostril route daily as needed for Allergies.      gabapentin (NEURONTIN) 100 MG capsule Take 1 capsule (100 mg total) by mouth as needed (pain).      metoprolol succinate (TOPROL-XL) 25 MG 24 hr tablet Take 0.5 tablets (12.5 mg total) by mouth once daily. 45 tablet 3    multivitamin (THERAGRAN) per tablet Take 1 tablet by mouth Daily.      NIFEdipine (PROCARDIA-XL) 30 MG (OSM) 24 hr tablet Take 1 tablet (30 mg total) by mouth 2 (two) times a day. 180 tablet 3    nitroGLYCERIN (NITROSTAT) 0.4 MG SL tablet Place 1 tablet (0.4 mg total) under the tongue every 5 (five) minutes as needed for Chest pain. 25 tablet 0    omeprazole (PRILOSEC) 20 MG capsule Take 1 capsule (20 mg total) by mouth daily as needed (heartburn).      predniSONE (DELTASONE) 5 MG tablet Take 1 tablet (5 mg total) by mouth once daily. 90 tablet 3    pulse oximeter (PULSE OXIMETER)  "device by Apply Externally route 2 (two) times a day. Use twice daily at 8 AM and 3 PM and record the value in [employer] as directed. 1 each 0    tacrolimus (PROGRAF) 1 MG Cap Take 3 capsules (3 mg total) by mouth every 12 (twelve) hours for 60 days, THEN 2 capsules (2 mg total) every 12 (twelve) hours for 60 days, THEN 1 capsule (1 mg total) every 12 (twelve) hours for 60 days, THEN 1 capsule (1 mg total) once daily. 540 capsule 4    tiotropium bromide (SPIRIVA RESPIMAT) 1.25 mcg/actuation inhaler Inhale 2 puffs into the lungs once daily. Controller. Use this inhaler every day. 4 g 11    traMADoL (ULTRAM) 50 mg tablet Take 1 tablet (50 mg total) by mouth every 6 (six) hours as needed for Pain. 20 tablet 0     No current facility-administered medications on file prior to visit.        OBJECTIVE:     Vital Signs:  /70   Pulse (!) 57   Ht 6' 1" (1.854 m)   Wt 80 kg (176 lb 5.9 oz)   BMI 23.27 kg/m²     Physical Exam:  Physical Exam  Constitutional:       General: He is not in acute distress.     Appearance: Normal appearance. He is not ill-appearing or diaphoretic.      Comments: Well-appearing man in NAD.   HENT:      Head: Normocephalic and atraumatic.      Nose: Nose normal.      Mouth/Throat:      Mouth: Mucous membranes are moist.      Pharynx: Oropharynx is clear.   Eyes:      Pupils: Pupils are equal, round, and reactive to light.   Cardiovascular:      Rate and Rhythm: Regular rhythm. Bradycardia present.      Pulses: Normal pulses.      Heart sounds: Murmur heard.      No friction rub. No gallop.      Comments: I/VI KAILASH best appreciated at the RUSB.  Pulmonary:      Effort: Pulmonary effort is normal. No respiratory distress.      Breath sounds: Normal breath sounds. No wheezing, rhonchi or rales.   Chest:      Chest wall: No tenderness.   Abdominal:      General: There is no distension.      Palpations: Abdomen is soft.      Tenderness: There is no abdominal tenderness.   Musculoskeletal:         " General: No swelling or tenderness.      Cervical back: Normal range of motion.      Right lower leg: Edema present.      Left lower leg: Edema present.      Comments: Trace bilateral pedal edema, wearing compression stockings in clinic.    Skin:     General: Skin is warm and dry.      Findings: No erythema, lesion or rash.   Neurological:      General: No focal deficit present.      Mental Status: He is alert and oriented to person, place, and time. Mental status is at baseline.      Motor: No weakness.      Gait: Gait normal.   Psychiatric:         Mood and Affect: Mood normal.         Behavior: Behavior normal.        Laboratory Data:  Lab Results   Component Value Date    WBC 12.13 08/12/2024    HGB 12.0 (L) 08/12/2024    HCT 39.1 (L) 08/12/2024    MCV 82 08/12/2024     (L) 08/12/2024     Lab Results   Component Value Date    GLU 82 08/12/2024     08/12/2024    K 4.4 08/12/2024     08/12/2024    CO2 22 (L) 08/12/2024    BUN 40 (H) 08/12/2024    CREATININE 7.0 (H) 08/12/2024    CALCIUM 8.4 (L) 08/12/2024    MG 2.3 04/12/2024     Lab Results   Component Value Date    INR 1.3 (H) 04/09/2024    INR 1.2 02/25/2024    INR 1.4 (H) 02/23/2024       Pertinent Cardiac Data:  Personal interpretation of today's ECG: Sinus bradycardia with rate of 57 bpm,  ms, LVH with repolarization changes  ms, QT/QTc 478/465 ms, nonspecific ST changes.     Resting 2D Transthoracic Echocardiogram - 3/23/2020:  Concentric left ventricular remodeling.  Normal left ventricular systolic function. The estimated ejection fraction is 60%.  Normal right ventricular systolic function.  Normal LV diastolic function.  Aortic sclerosis without significant stenosis.  Intermediate central venous pressure (8 mmHg).    Pharmacologic Nuclear Cardiac Stress Test - SPECT - 6/29/2020:    The perfusion scan is free of evidence from myocardial ischemia or injury.    Gated perfusion images showed an ejection fraction of 63% at  rest and 67% post stress. Normal ejection fraction is greater than 51%.    There is normal wall motion at rest and post stress.    LV cavity size is normal at rest and normal at stress.    The EKG portion of this study is abnormal but not diagnostic.    The patient reported no chest pain during the stress test.    Arrhythmias during stress: rare PACs, occasional PVCs.    There are no prior studies for comparison.    Resting 2D Transthoracic Echocardiogram - 3/21/2022:  Moderate left atrial enlargement.  The left ventricle is normal in size with concentric remodeling and normal systolic function.  The estimated ejection fraction is 55%.  There are segmental left ventricular wall motion abnormalities : akinetic basal inferior wall.  Indeterminate left ventricular diastolic function.  Mild right atrial enlargement.  Normal right ventricular size with normal right ventricular systolic function.  There is mild aortic valve stenosis.  Aortic valve area is 1.84 cm2; peak velocity is 2.60 m/s; mean gradient is 13 mmHg.  Mild aortic regurgitation.  Mild pulmonic regurgitation.  The estimated PA systolic pressure is 25 mmHg.  Normal central venous pressure (3 mmHg).    Pharmacologic Nuclear Cardiac Stress Test - SPECT - 4/28/2022:    Normal myocardial perfusion scan. There is no evidence of myocardial ischemia or infarction.    The visually estimated ejection fraction is normal at rest and normal during stress.    There is normal wall motion at rest and post stress.    LV cavity size is normal at rest and normal at stress.    The EKG portion of this study is abnormal but not diagnostic.    The patient reported no chest pain during the stress test.    There were no arrhythmias during stress.    When compared to the previous study from 6/29/2020, there are no significant changes.    Resting 2D Transthoracic Echocardiogram - 2/10/2023:  The left ventricle is normal in size with normal systolic function. The estimated ejection  fraction is 65%.  Normal right ventricular size with normal right ventricular systolic function.  Grade II left ventricular diastolic dysfunction.  Severe left atrial enlargement.  There is mild aortic valve stenosis. Aortic valve area is 1.8 cm2; peak velocity is 2.9 m/s; mean gradient is 15 mmHg.  Pulmonary HTN - the estimated PA systolic pressure is > 41mm Hg. (The IVC was not well visualized.)    14-Day Ambulatory Event Monitor - 6/23/2023:  Baseline rhythm was normal sinus rhythm with normal intervals and an average heart rate of 65 bpm.  There were two patient-triggered episodes with reported symptoms of dizziness, lightheadedness, palpitations, shortness of breath, and heart fluttering. These episodes corresponded to a period of normal sinus rhythm with PACs and an episode of atrial fibrillation with RVR.  There were occasional PVCs with an overall PVC burden of 4.6% (67.7K). There were rare runs of nonsustained ventricular tachycardia with the longest run lasting 4 beats.  There were frequent PACs with an overall PAC burden of 11.5%. There were occasional runs of nonsustained atrial tachycardia with the longest run lasting 4 beats.  There was no evidence of high-degree AV block, nor were there prolonged pauses.  There were multiple episodes of paroxysmal atrial fibrillation, at times with RVR. The longest event of pAF lasted 1 day and 9 hours, with an overall burden of pAF of 10%.  There were no sustained ventricular arrhythmias.    Pharmacologic Nuclear Cardiac Stress Test - PET - 9/13/2023:    There is a small sized, mild intensity apical inferior and inferoseptal reversible perfusion abnormality involving 6% of LV myocardium indicative of focal coronary stenosis.    Within the perfusion abnormality, absolute myocardial perfusion (cc/min/gm) averaged 0.56 cc/min/g at rest, 0.71 cc/min/g at stress and CFR was 1.27 , which equates to severely reduced coronary flow capacity within the LAD territory.    There  are no other significant perfusion abnormalities.    The whole heart absolute myocardial perfusion values averaged 0.74 cc/min/g at rest, which is normal; 1.17 cc/min/g at stress, which is mildly reduced; and CFR is 1.59 , which is mildly reduced.    CT attenuation images demonstrate severe diffuse coronary calcifications in the LAD, and moderate diffuse coronary calcifications in the LCX and RCA territory and mild diffuse aortic calcifications in the descending aorta.    The gated perfusion images showed an ejection fraction of 53% at rest and 56% during stress. A normal ejection fraction is greater than 47%.    The wall motion is normal at rest and during stress.    The LV cavity size is mildly enlarged at rest and stress.    The ECG portion of the study is abnormal but not diagnostic due to resting ST-T abnormalities.    There were no arrhythmias during stress.    The patient reported chest pain during the stress test.    There are no prior studies for comparison.    Resting 2D Transthoracic Echocardiogram - 10/15/2023:    Left Ventricle: The left ventricle is normal in size. Ventricular mass is normal. Normal wall thickness. Normal wall motion. There is normal systolic function with a visually estimated ejection fraction of 60 - 65%. Grade II diastolic dysfunction.    Left Atrium: Left atrium is severely dilated.    Right Ventricle: Normal right ventricular cavity size. Wall thickness is normal. Right ventricle wall motion  is normal. Systolic function is normal.    Right Atrium: Right atrium is dilated.    Aortic Valve: Moderately calcified cusps. There is moderate annular calcification present. Mildly restricted motion. There is mild stenosis. Aortic valve area by VTI is 1.85 cm². Aortic valve peak velocity is 2.12 m/s. Mean gradient is 9 mmHg. The dimensionless index is 0.49. There is mild to moderate aortic regurgitation.    Pulmonary Artery: The estimated pulmonary artery systolic pressure is 59 mmHg.     IVC/SVC: Normal venous pressure at 3 mmHg.    Resting 2D Transthoracic Echocardiogram - 11/24/2023:    Left Ventricle: The left ventricle is normal in size. Ventricular mass is normal. Normal wall thickness. There is concentric remodeling. Normal wall motion. There is normal systolic function with a visually estimated ejection fraction of 55 - 60%. Biplane (2D) method of discs ejection fraction is 55%. There is diastolic dysfunction. Elevated left ventricular filling pressure.    Right Ventricle: Normal right ventricular cavity size. Systolic function is normal.    Left Atrium: Left atrium is moderately dilated.    Aortic Valve: There is moderate aortic valve sclerosis. Moderately restricted motion. There is mild to moderate stenosis. Aortic valve area by VTI is 1.34 cm². Aortic valve peak velocity is 2.92 m/s. Mean gradient is 18 mmHg. The dimensionless index is 0.41. There is mild to moderate aortic regurgitation.    Mitral Valve: There is moderate posterior mitral annular calcification present. There is mild regurgitation.    Tricuspid Valve: There is mild regurgitation.    Pulmonic Valve: There is mild regurgitation.    Pulmonary Artery: The estimated pulmonary artery systolic pressure is 45 mmHg.    IVC/SVC: Intermediate venous pressure at 8 mmHg.    Resting 2D Transthoracic Echocardiogram - 2/26/2024:    Left Ventricle: There is normal systolic function with a visually estimated ejection fraction of 60 - 65%. Grade II diastolic dysfunction. Elevated left ventricular filling pressure.    Right Ventricle: Normal right ventricular cavity size. Wall thickness is normal. Right ventricle wall motion  is normal. Systolic function is normal.    Left Atrium: Left atrium is moderately dilated.    Right Atrium: Right atrium is moderately dilated.    Aortic Valve: The aortic valve is a trileaflet valve. There is moderate aortic valve sclerosis. There is moderate annular calcification present. There is mild to moderate  aortic regurgitation. There appers to be mobile echodensoty attached to the base of the non coronary leaflet that is likely nodular calcificaiton and is tthe site of origin of Aortic regurgitation. This appears unchanged from pror TTE. AV leaflets appears thickened in some views cannot exlude presence of endocarditis (clip # 5).    Aortic Valve: Mildly restricted motion. There is mild stenosis. Aortic valve area by VTI is 1.62 cm². Aortic valve peak velocity is 2.62 m/s. Mean gradient is 14 mmHg. The dimensionless index is 0.47.    Mitral Valve: There is moderate mitral annular calcification present. There is mild to moderate regurgitation.    Aorta: Aortic root is normal in size measuring 3.61 cm. Ascending aorta is normal measuring 2.80 cm.    Pulmonary Artery: The estimated pulmonary artery systolic pressure is 49 mmHg.    IVC/SVC: Intermediate venous pressure at 8 mmHg.    Coronary Angiogram - 2/26/2024:    The Prox LAD lesion was 60% stenosed.    The Mid LAD lesion was 70% stenosed.    The Prox RCA lesion was 95% stenosed.    The 1st Mrg lesion was 70% stenosed.    The 2nd Mrg lesion was 70% stenosed.    The estimated blood loss was none.    There was three vessel coronary artery disease.    Coronary Angiogram - 2/28/2024:    The Mid RCA lesion was 80% stenosed with 0% stenosis post-intervention.    The Prox RCA lesion was 99% stenosed with 0% stenosis post-intervention.    Prox RCA lesion: A .    Prox RCA lesion, Mid RCA lesion: A .    Prox RCA lesion, Mid RCA lesion: A STENT SYNERGY XD [id number] stent was successfully placed.    Mid RCA lesion: A STENT SYNERGY XD 2.5X8MM stent was successfully placed.    The estimated blood loss was none.    Resting 2D Transthoracic Echocardiogram - 4/10/2024:    Left Ventricle: The left ventricle is normal in size. Normal wall thickness. There is mildly reduced systolic function with a visually estimated ejection fraction of 45 - 50%. There is normal diastolic function.     Right Ventricle: Normal right ventricular cavity size. Wall thickness is normal. Right ventricle wall motion  is normal. Systolic function is normal.    Left Atrium: Left atrium is mildly dilated.    Right Atrium: Right atrium is mildly dilated.    Aortic Valve: There is mild stenosis. Aortic valve area by VTI is 2.03 cm². Aortic valve peak velocity is 2.73 m/s. Mean gradient is 15 mmHg. The dimensionless index is 0.49. There is mild aortic regurgitation.    Pulmonary Artery: There is mild pulmonary hypertension. The estimated pulmonary artery systolic pressure is 40 mmHg.    IVC/SVC: Normal venous pressure at 3 mmHg.      ASSESSMENT & PLAN:   Mr. Ibrahima Phelps is a darshan 68-year-old gentleman who returns to clinic today for ongoing evaluation and recommendations regarding tachycardia-bradycardia syndrome with periods of paroxysmal atrial fibrillation, s/p aborted cardioversion on 7/17/2023. He has a past medical history significant for tachycardia-bradycardia syndrome, paroxysmal atrial fibrillation, heart failure with mildly reduced systolic function with most recent LVEF 45-50%, mild aortic stenosis, pulmonary hypertension with his most recent estimated PASP of 40mmHg, coronary artery disease with PCI in 2006 and a subsequent NSTEMI with PCI of the mid-RCA and prox-RCA on 2/28/2024, hypertension, hyperlipidemia, aortic atherosclerosis, glucose intolerance, end stage renal disease with a history of prior renal transplant x2 in 2002 and 2005 with chronic immunosuppressive medication - maintained on hemodialysis T/R/S, secondary hyperparathyroidism, diverticulosis, COPD, GERD, hypothyroidism, gout, a history of tobacco use, and obesity. He was previously scheduled to undergo a cardioversion on 7/1//2023; however, he presented in sinus rhythm on that day. He has been maintained in asymptomatic sinus bradycardia on amiodarone 200mg po daily.      - We continue to discuss the pathophysiology of atrial fibrillation;  specifically, we discussed paroxysmal atrial fibrillation and the concept that some patients may experience paroxysms of atrial fibrillation interrupting periods of sinus rhythm. We reviewed that there is an increased risk of CVA with atrial fibrillation.  - He remains in sinus rhythm on assessment today and reports that since starting amiodarone therapy, he has experienced less frequent episodes of atrial fibrillation. We will continue amiodarone 200mg po daily.   - He was previously maintained on metoprolol succinate 12.5mg po daily; however, this agent was recently decreased at his dialysis session on 9/10/2024 when he was noted to have asymptomatic bradycardia during his HD sessions. On today's assessment, his HR has increased to 58-60bpm with no reported symptoms.  - We reviewed the results of his most recent resting echocardiogram revealing mildly reduced systolic function with most recent LVEF 45-50%, mild aortic stenosis, and pulmonary hypertension with his most recent estimated PASP of 40mmHg. This was his first echocardiogram performed following initiation of hemodialysis and was the first time his systolic function was noted to have mildly decreased. He has no current signs or symptoms of heart failure, and we will continue to monitor.   - His [id number] is 4 (HFmrEF, HTN, aortic atherosclerosis, male gender, age 65-74), portending an annual adjusted risk of CVA of 4%. He remains on uninterrupted apixaban 5mg po BID therapy with no bleeding events reported.   - We discussed that in some patients, a pacemaker may be required in order to increase antiarrhythmic therapy without causing symptomatic bradycardia. He denies any symptoms while in sinus bradycardia today.  - We discussed the possibility of catheter ablation with pulmonary vein isolation should he continue to have symptoms and AF despite AAD therapy. He voices understanding, although he would like to remain on rhythm control with medication at  this time.   - We discussed obtaining another 14-day ambulatory event monitor for close monitoring of his heart rates and to reassess his overall burden of atrial fibrillation. He declines this monitoring at this time, and has not been noted to have recurrent atrial fibrillation during monitoring at his hemodialysis sessions.   - Possible underlying drivers of atrial fibrillation were addressed at this appointment, including recommendations for maintenance of a healthy BMI - now a class I recommendation. Review of laboratory data revealed stable and acceptable TSH at 0.859. A request for a sleep study was deferred by the patient today.    - He will continue to follow with his neurologist, nephrologist, PCP, and general cardiology team for ongoing evaluation and recommendations regarding his chronic comorbid conditions.      This patient will return to clinic with me in six months with continued amiodarone and uninterrupted oral anticoagulation with apixaban. All questions and concerns were addressed at this encounter. Total time spent in this patient encounter: 34 minutes.     Signing Physician:       CHARLEY Parmar MD  Electrophysiology Attending

## 2024-09-23 ENCOUNTER — PATIENT OUTREACH (OUTPATIENT)
Dept: ADMINISTRATIVE | Facility: HOSPITAL | Age: 69
End: 2024-09-23
Payer: MEDICARE

## 2024-09-23 ENCOUNTER — PATIENT MESSAGE (OUTPATIENT)
Dept: ADMINISTRATIVE | Facility: HOSPITAL | Age: 69
End: 2024-09-23
Payer: MEDICARE

## 2024-09-23 DIAGNOSIS — Z12.11 ENCOUNTER FOR COLORECTAL CANCER SCREENING: Primary | ICD-10-CM

## 2024-09-23 DIAGNOSIS — Z12.12 ENCOUNTER FOR COLORECTAL CANCER SCREENING: Primary | ICD-10-CM

## 2024-09-23 NOTE — PROGRESS NOTES
Health Maintenance Due   Topic Date Due    Colorectal Cancer Screening  03/03/2023    Influenza Vaccine (1) 09/01/2024    COVID-19 Vaccine (7 - 2023-24 season) 09/01/2024     Triggered LINKS and reconciled immunizations. Updated Care Everywhere. Pt responded to campaigns outreach asking to complete colorectal cancer screening- referral to endo  placed and pt notified they will be contacted to schedule. Chart review completed.

## 2024-09-25 ENCOUNTER — CLINICAL SUPPORT (OUTPATIENT)
Dept: ENDOSCOPY | Facility: HOSPITAL | Age: 69
End: 2024-09-25
Attending: STUDENT IN AN ORGANIZED HEALTH CARE EDUCATION/TRAINING PROGRAM
Payer: MEDICARE

## 2024-09-25 DIAGNOSIS — N18.6 ESRD (END STAGE RENAL DISEASE): Primary | ICD-10-CM

## 2024-09-25 DIAGNOSIS — Z12.12 ENCOUNTER FOR COLORECTAL CANCER SCREENING: ICD-10-CM

## 2024-09-25 DIAGNOSIS — Z12.11 ENCOUNTER FOR COLORECTAL CANCER SCREENING: ICD-10-CM

## 2024-09-25 RX ORDER — SODIUM, POTASSIUM,MAG SULFATES 17.5-3.13G
1 SOLUTION, RECONSTITUTED, ORAL ORAL ONCE
Qty: 1 KIT | Refills: 0 | Status: SHIPPED | OUTPATIENT
Start: 2024-09-25 | End: 2024-09-25

## 2024-10-11 ENCOUNTER — HOSPITAL ENCOUNTER (OUTPATIENT)
Dept: VASCULAR SURGERY | Facility: CLINIC | Age: 69
Discharge: HOME | End: 2024-10-11
Attending: SURGERY
Payer: MEDICARE

## 2024-10-11 ENCOUNTER — OFFICE VISIT (OUTPATIENT)
Dept: VASCULAR SURGERY | Facility: CLINIC | Age: 69
End: 2024-10-11
Attending: SURGERY
Payer: MEDICARE

## 2024-10-11 VITALS
WEIGHT: 168.19 LBS | DIASTOLIC BLOOD PRESSURE: 62 MMHG | HEART RATE: 66 BPM | OXYGEN SATURATION: 100 % | HEIGHT: 73 IN | SYSTOLIC BLOOD PRESSURE: 130 MMHG | BODY MASS INDEX: 22.29 KG/M2

## 2024-10-11 DIAGNOSIS — N18.6 ESRD (END STAGE RENAL DISEASE) ON DIALYSIS: Primary | ICD-10-CM

## 2024-10-11 DIAGNOSIS — Z99.2 ESRD (END STAGE RENAL DISEASE) ON DIALYSIS: ICD-10-CM

## 2024-10-11 DIAGNOSIS — Z99.2 ESRD (END STAGE RENAL DISEASE) ON DIALYSIS: Primary | ICD-10-CM

## 2024-10-11 DIAGNOSIS — N18.6 ESRD (END STAGE RENAL DISEASE) ON DIALYSIS: ICD-10-CM

## 2024-10-11 PROCEDURE — 99999 PR PBB SHADOW E&M-EST. PATIENT-LVL IV: CPT | Mod: PBBFAC,HCNC,, | Performed by: SURGERY

## 2024-10-11 PROCEDURE — 93990 DOPPLER FLOW TESTING: CPT | Mod: HCNC,S$GLB,, | Performed by: SURGERY

## 2024-10-11 NOTE — PROGRESS NOTES
VASCULAR SURGERY SERVICE    REFERRING DOCTOR: Emre Latif MD    CHIEF COMPLAINT: CKD 5    HISTORY OF PRESENT ILLNESS: Ibrahima Phelps is a 68 y.o. male status post 2 kidney transplantations the last in 2005, now with the client renal function with a GFR of 9.7 not yet initiated hemodialysis.  He is right-handed.  He would a Veronica AV fistula in the left many many years ago.    S/p    PTA, left AV fistula 6/3/2024, 02/19/2024  left brachiocephalic AV fistula creation 12/18/2023    He is taking Eliquis for atrial fibrillation.    He is no history of AICD or pacemaker placement    02/06/2024:  This is initial follow-up visit status post left brachiocephalic AV fistula creation 12/18/2023.  He has not initiated hemodialysis.  Denies any hand pain weakness or numbness.  This fistula was felt likely that he had a outflow vein stenosis in the cephalic vein    03/22/2024:  This is postoperative appointment after angioplasty of his left AV fistula 1 month ago.  He subsequently initiated hemodialysis proximally 3 weeks ago.  He would 1 episode of infiltration last week but none since    05/24/2024:  Now presents for follow-up.  Since then notes that his hemodialysis has been working through his fistula without any sort of issues.  No more issues of infiltration, heavy bleeding.  Notes that the hematoma that was present prior in March as still been there, largely unchanged.    06/07/2024:  He now returns after recent fistulogram.  This was prompted by a large mid pseudoaneurysm.  However when he presented with a fistulogram the very pulsatile pseudoaneurysms was largely nonpulsatile.  The fistulogram revealed only trace filling of the pseudoaneurysms and thus I did not place a covered stent.  However there were 2-3 areas of outflow stenosis of the cephalic arch which I treated.    He has having no problems using the fistula    10/11/2024:  This is a four-month follow-up.  He is not having any troubles using the  "fistula.  He is on Eliquis 5 mg p.o. b.i.d. for AFib and Plavix for PCI done earlier this year.  Not surprisingly it takes a little longer for his needle sticks to stop bleeding, but this is been stable for the last 3-4 months      Past Medical History:   Diagnosis Date    Atrial fibrillation     CAD (coronary artery disease) 2006    MI in 2006    CHF (congestive heart failure) 2017    CKD (chronic kidney disease) stage 3, GFR 30-59 ml/min     Dr. Latif.  in transplanted kidney    CKD (chronic kidney disease) stage 5, GFR less than 15 ml/min 02/02/2024    COVID-19 02/07/2023    Diverticulosis     Hyperlipidemia     Hypertension     Keloid cicatrix     Metabolic bone disease     Other emphysema 10/01/2019    Pericarditis     S/P kidney transplant 1992    x2 (1992 & 2005),    Thrombocytopenia     Thyroid disease     Tobacco use 09/05/2014       Past Surgical History:   Procedure Laterality Date    AV FISTULA PLACEMENT Left 12/18/2023    Procedure: CREATION, AV FISTULA;  Surgeon: JUANI Melendez III, MD;  [facility];  Service: Vascular;  Laterality: Left;  LUE AVF creation vs AVG insertion    CARDIOVERSION  04/30/15    CHOLECYSTECTOMY      COLONOSCOPY  April 20, 2011    Diverticulosis, repeat recommended in 3 yrs., repeat colonoscopy 2014 revealed 2 polyps.  He should return in 5 years.    COLONOSCOPY N/A 3/13/2020    Procedure: COLONOSCOPY;  Surgeon: Chon Casper MD;  Location: [facility] (4TH FLR);  Service: Endoscopy;  Laterality: N/A;  ok to hold coumadin x5days-see telephone encounter 2/4/20-tb    COLONOSCOPY N/A 2/4/2021    Procedure: COLONOSCOPY;  Surgeon: Chon Casper MD;  Location: [facility] (4TH FLR);  Service: Endoscopy;  Laterality: N/A;  Eliquis - per Dr. GAVIN Blunt ok to hold x 2 days and "restarted asap"- ERW  last prep "poor", [id number] ordered/ Pt requests Dr. Casper  prep ins. mailed - COVID screening on 2/1/21 PCW- ERW    COLONOSCOPY N/A 3/3/2021    Procedure: COLONOSCOPY;  " Surgeon: Chon Casper MD;  Location: [facility] (4TH FLR);  Service: Endoscopy;  Laterality: N/A;  Patient with his second poor bowel pre and has poor prep today.  If patient not intersted or can't get colonoscopy tomorrow will need constipation bowel prep and will need to restart his Eliquis today.Thanks,Chon     per Dr Lopez to hold Eliquis 2 days prior      COVID     CORONARY ANGIOGRAPHY N/A 2/28/2024    Procedure: ANGIOGRAM, CORONARY ARTERY;  Surgeon: Tylor Lincoln MD;  Location: [facility] CATH LAB;  Service: Cardiology;  Laterality: N/A;    CORONARY ANGIOPLASTY WITH STENT PLACEMENT  9/13/2006    FISTULOGRAM N/A 2/19/2024    Procedure: Fistulogram;  Surgeon: JUANI Melendez III, MD;  Location: [facility] CATH LAB;  Service: Peripheral Vascular;  Laterality: N/A;    FISTULOGRAM, WITH PTA Left 6/3/2024    Procedure: FISTULOGRAM, WITH PTA;  Surgeon: JUANI Melendez III, MD;  Location: [facility] OR 2ND FLR;  Service: Vascular;  Laterality: Left;  mGy: 51.58  GyCm2: 6.8285  Fluoro time: 5.1 min  Contrast: 28 cc    IRRIGATION AND DEBRIDEMENT Right 8/30/2023    Procedure: IRRIGATION AND DEBRIDEMENT, RIGHT MIDDLE FINGER;  Surgeon: Martin Larsen MD;  Location: [facility] OR 2ND FLR;  Service: Orthopedics;  Laterality: Right;    KIDNEY TRANSPLANT  2005    LEFT HEART CATHETERIZATION N/A 2/26/2024    Procedure: Left heart cath;  Surgeon: Tylor Lincoln MD;  Location: [facility] CATH LAB;  Service: Cardiology;  Laterality: N/A;    PARATHYROIDECTOMY      PERCUTANEOUS TRANSLUMINAL ANGIOPLASTY OF ARTERIOVENOUS FISTULA Left 2/19/2024    Procedure: PTA, AV FISTULA;  Surgeon: JUANI Melendez III, MD;  Location: [facility] CATH LAB;  Service: Peripheral Vascular;  Laterality: Left;    STENT, DRUG ELUTING, SINGLE VESSEL, CORONARY N/A 2/28/2024    Procedure: Stent, Drug Eluting, Single Vessel, Coronary;  Surgeon: Tylor Lincoln MD;  Location: [facility] CATH LAB;  Service: Cardiology;  Laterality: N/A;    TREATMENT OF CARDIAC ARRHYTHMIA  N/A 3/28/2022    Procedure: CARDIOVERSION;  Surgeon: Migue Blunt MD;  Location: [facility] EP LAB;  Service: Cardiology;  Laterality: N/A;  AF, DCC, MAC, GP, 3 PREP*RODRIGO deferred pt 100% compliant*    VENOPLASTY  6/3/2024    Procedure: ANGIOPLASTY, VEIN;  Surgeon: JUANI Melendez III, MD;  [facility];  Service: Vascular;;  Left subclavian         Current Outpatient Medications:     acetaminophen (TYLENOL) 500 MG tablet, Take 1 tablet (500 mg total) by mouth every 6 (six) hours as needed for Pain., Disp: 20 tablet, Rfl: 0    albuterol (VENTOLIN HFA) 90 mcg/actuation inhaler, Inhale 2 puffs into the lungs every 6 (six) hours as needed for Wheezing or Shortness of Breath. Rescue, Disp: 18 g, Rfl: 3    albuterol-ipratropium (DUO-NEB) 2.5 mg-0.5 mg/3 mL nebulizer solution, Take 3 mLs by nebulization every 4 (four) hours as needed for Wheezing. Rescue, Disp: 75 mL, Rfl: 0    amiodarone (PACERONE) 200 MG Tab, TAKE 1 TABLET BY MOUTH EVERY DAY, Disp: 90 tablet, Rfl: 3    atorvastatin (LIPITOR) 80 MG tablet, Take 1 tablet (80 mg total) by mouth once daily., Disp: 90 tablet, Rfl: 3    b complex vitamins (B COMPLEX-VITAMIN B12) tablet, Take 1 tablet by mouth once daily., Disp: 90 tablet, Rfl: 3    calcium carbonate (CALCIUM 600 ORAL), Take 1 tablet by mouth 2 (two) times a day., Disp: , Rfl:     CHOLECALCIFEROL, VITAMIN D3, ORAL, Take 2 tablets by mouth 2 (two) times daily., Disp: , Rfl:     clopidogreL (PLAVIX) 75 mg tablet, Take 1 tablet (75 mg total) by mouth once daily., Disp: 90 tablet, Rfl: 3    doxazosin (CARDURA) 4 MG tablet, Take 1 tablet (4 mg total) by mouth every 12 (twelve) hours., Disp: 180 tablet, Rfl: 3    ELIQUIS 5 mg Tab, TAKE 1 TABLET BY MOUTH TWICE A DAY, Disp: 60 tablet, Rfl: 32    famotidine (PEPCID) 20 MG tablet, TAKE 1 TABLET (20 MG TOTAL) BY MOUTH ONCE DAILY. ON DIALYSIS DAYS, PLEASE TAKE THIS MEDICINE AFTER DIALYSIS., Disp: 90 tablet, Rfl: 1    fexofenadine HCl (ALLEGRA ORAL), Take 1  tablet by mouth daily as needed (allergies)., Disp: , Rfl:     fluticasone furoate-vilanteroL (BREO) 100-25 mcg/dose diskus inhaler, Inhale 1 puff into the lungs once daily. Controller. Use this inhaler every day., Disp: , Rfl:     fluticasone propionate (FLONASE) 50 mcg/actuation nasal spray, 1 spray (50 mcg total) by Each Nostril route daily as needed for Allergies., Disp: , Rfl:     gabapentin (NEURONTIN) 100 MG capsule, Take 1 capsule (100 mg total) by mouth as needed (pain)., Disp: , Rfl:     metoprolol succinate (TOPROL-XL) 25 MG 24 hr tablet, Take 0.5 tablets (12.5 mg total) by mouth once daily., Disp: 45 tablet, Rfl: 3    multivitamin (THERAGRAN) per tablet, Take 1 tablet by mouth Daily., Disp: , Rfl:     nitroGLYCERIN (NITROSTAT) 0.4 MG SL tablet, Place 1 tablet (0.4 mg total) under the tongue every 5 (five) minutes as needed for Chest pain., Disp: 25 tablet, Rfl: 0    omeprazole (PRILOSEC) 20 MG capsule, Take 1 capsule (20 mg total) by mouth daily as needed (heartburn)., Disp: , Rfl:     predniSONE (DELTASONE) 5 MG tablet, Take 1 tablet (5 mg total) by mouth once daily., Disp: 90 tablet, Rfl: 3    pulse oximeter (PULSE OXIMETER) device, by Apply Externally route 2 (two) times a day. Use twice daily at 8 AM and 3 PM and record the value in [facility] as directed., Disp: 1 each, Rfl: 0    tiotropium bromide (SPIRIVA RESPIMAT) 1.25 mcg/actuation inhaler, Inhale 2 puffs into the lungs once daily. Controller. Use this inhaler every day., Disp: 4 g, Rfl: 11    traMADoL (ULTRAM) 50 mg tablet, Take 1 tablet (50 mg total) by mouth every 6 (six) hours as needed for Pain., Disp: 20 tablet, Rfl: 0    NIFEdipine (PROCARDIA-XL) 30 MG (OSM) 24 hr tablet, Take 1 tablet (30 mg total) by mouth 2 (two) times a day. (Patient not taking: Reported on 9/12/2024), Disp: 180 tablet, Rfl: 3    tacrolimus (PROGRAF) 1 MG Cap, Take 3 capsules (3 mg total) by mouth every 12 (twelve) hours for 60 days, THEN 2 capsules (2 mg total) every 12  "(twelve) hours for 60 days, THEN 1 capsule (1 mg total) every 12 (twelve) hours for 60 days, THEN 1 capsule (1 mg total) once daily., Disp: 540 capsule, Rfl: 4    Review of patient's allergies indicates:   Allergen Reactions    Ace inhibitors Swelling    Verapamil Other (See Comments)     Other reaction(s): Unknown    Povidone-iodine Itching       Family History   Problem Relation Name Age of Onset    No Known Problems Sister      No Known Problems Brother      Thyroid disease Mother          s/p surgery    Heart disease Father          had pacemaker    No Known Problems Sister      Kidney failure Sister      Kidney disease Sister      No Known Problems Sister      Kidney disease Brother      Kidney failure Brother          s/p transplant    Diabetes Mellitus Neg Hx      Stroke Neg Hx      Heart attack Neg Hx      Cancer Neg Hx      Celiac disease Neg Hx      Cirrhosis Neg Hx      Colon cancer Neg Hx      Esophageal cancer Neg Hx      Inflammatory bowel disease Neg Hx      Rectal cancer Neg Hx      Stomach cancer Neg Hx      Ulcerative colitis Neg Hx      Liver disease Neg Hx      Liver cancer Neg Hx      Crohn's disease Neg Hx      Melanoma Neg Hx         Social History     Tobacco Use    Smoking status: Every Day     Current packs/day: 0.00     Average packs/day: 0.5 packs/day for 40.0 years (20.0 ttl pk-yrs)     Types: Cigarettes     Start date: 1982     Last attempt to quit: 2022     Years since quittin.6    Smokeless tobacco: Never    Tobacco comments:     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     Patient is currently retired   Substance Use Topics    Alcohol use: No    Drug use: No       PHYSICAL EXAM:   /62   Pulse 66   Ht 6' 1" (1.854 m)   Wt 76.3 kg (168 lb 3.4 oz)   SpO2 100%   BMI 22.19 kg/m²   Constitutional:  Alert,   Well-appearing  In no distress.   Neurological: Normal speech  no focal findings  CN II - XII grossly intact.  "   Psychiatric: Mood and affect appropriate and symmetric.   HEENT: Normocephalic / atraumatic  PERRLA  Midline trachea  No scars across the neck   Cardiac: Regular rate and rhythm.   Pulmonary: Normal pulmonary effort.   Abdomen: Soft, not distended.     Skin: Warm and well perfused.    Vascular:  Right radial pulse 1 to 2+, left brachial pulse 3 +   Extremities/  Musculoskeletal: Left arm shows an excellent thrill with only modest pulsatility in the proximal segment.  Near the shoulder there is somewhat more pulsatility of the fistula     6/7/2024    IMAGING:  Duplex of the fistula shows a moderate outflow stenosis, although robust flow volume of 2.8 L     Recent fistulagram personally reviewed      IMPRESSION:  Well matured left brachiocephalic AV fistula, with moderate recurrent outflow stenosis.  We will observe    PLAN:  Follow up in 4 months with AV fistula duplex sooner if clinically indicated    INDIGO Melendez III, MD, FACS  Professor and Chief, Vascular and Endovascular Surgery

## 2024-11-22 ENCOUNTER — TELEPHONE (OUTPATIENT)
Dept: NEPHROLOGY | Facility: CLINIC | Age: 69
End: 2024-11-22
Payer: MEDICARE

## 2024-11-22 NOTE — TELEPHONE ENCOUNTER
----- Message from Med Assistant Jackson sent at 11/21/2024  4:48 PM CST -----  Regarding: FW: Questions    ----- Message -----  From: Itzel Peterson  Sent: 11/21/2024   3:17 PM CST  To: [facility] Staff  Subject: Questions                                              Name Of Caller:   Patti (Pt's Wife)      Contact Preference:   [phone]      Nature of call:    Requesting to speak with a nurse about the pt's Tacrolimus (PROGRAF).

## 2024-11-29 ENCOUNTER — PATIENT MESSAGE (OUTPATIENT)
Dept: ENDOSCOPY | Facility: HOSPITAL | Age: 69
End: 2024-11-29
Payer: MEDICARE

## 2024-11-29 ENCOUNTER — TELEPHONE (OUTPATIENT)
Dept: ENDOSCOPY | Facility: HOSPITAL | Age: 69
End: 2024-11-29
Payer: MEDICARE

## 2024-11-29 NOTE — TELEPHONE ENCOUNTER
----- Message from RUSH Nayla sent at 2024  8:24 AM CST -----  [email or web address]  The patient is currently under an internal cardiologist, david ramos and argelia brito  [facility]. Is patient okay to hold blood thinner Eliquis (apixaban) and plavix for their upcoming scheduled Colonoscopy on 24.     Additional request(s) required:  internal cardiologist, tabitha  okay to hold blood thinner Eliquis (apixaban) and olavix        Notes: david ramos plavix  Argelia gaytan    Pt is esrd t-th-sat

## 2024-11-29 NOTE — TELEPHONE ENCOUNTER
Dear Dr Parmar,    Patient has a scheduled procedure Colonoscopy on 12/6/24 and is currently taking a blood thinner. In order to ensure patient safety, we would like to confirm that the patient can place their blood thinner medication on hold for the procedure. Can he/she discontinue Eliquis (apixaban) for a minimum of 2 days prior to the procedure?     Thank you for your prompt reply.    [facility] Endoscopy Scheduling

## 2024-11-29 NOTE — TELEPHONE ENCOUNTER
Dear MEENA CUNNINGHAM,    Patient has a scheduled procedure Colonoscopy on 12/6/24 and is currently taking a blood thinner. In order to ensure patient safety, we would like to confirm that the patient can place their blood thinner medication on hold for the procedure. Can he/she discontinue Plavix (clopidogrel) for a minimum of 5 days prior to the procedure?     Thank you for your prompt reply.    [facility] Endoscopy Scheduling

## 2024-11-29 NOTE — TELEPHONE ENCOUNTER
----- Message from RUSH Nayla sent at 2024  8:24 AM CST -----  [email or web address]  The patient is currently under an internal cardiologist, david ramos and argelia brito  [facility]. Is patient okay to hold blood thinner Eliquis (apixaban) and plavix for their upcoming scheduled Colonoscopy on 24.     Additional request(s) required:  internal cardiologist, tabitha  okay to hold blood thinner Eliquis (apixaban) and olavix        Notes: david ramos plavix  Argelia gaytan    Pt is esrd t-th-sat

## 2024-11-29 NOTE — TELEPHONE ENCOUNTER
Called pt to confirm colonoscopy for 12/6/24.informed pt we needed to obtain permission to hold eliquis and plavix.pt aware may need to re-schedule procedure.    Plavix and eliquis hold request sent to clearance nurse.

## 2024-12-02 NOTE — TELEPHONE ENCOUNTER
Contacted patient regarding message.  Spoke to wife-Shea.  Informed her of the recommendation from provider regarding holding the Plavix.  Explained that he needs to take Aspirin 81 mg while off of the Plavix.  Also instructed that we received approval to hold the Eliquis.  Stated understanding.  No further questions.

## 2024-12-05 ENCOUNTER — PATIENT MESSAGE (OUTPATIENT)
Dept: INTERNAL MEDICINE | Facility: CLINIC | Age: 69
End: 2024-12-05
Payer: MEDICARE

## 2024-12-06 ENCOUNTER — HOSPITAL ENCOUNTER (OUTPATIENT)
Facility: HOSPITAL | Age: 69
Discharge: HOME | End: 2024-12-07
Attending: STUDENT IN AN ORGANIZED HEALTH CARE EDUCATION/TRAINING PROGRAM | Admitting: STUDENT IN AN ORGANIZED HEALTH CARE EDUCATION/TRAINING PROGRAM
Payer: MEDICARE

## 2024-12-06 ENCOUNTER — DOCUMENTATION ONLY (OUTPATIENT)
Dept: ENDOSCOPY | Facility: HOSPITAL | Age: 69
End: 2024-12-06
Payer: MEDICARE

## 2024-12-06 ENCOUNTER — LAB VISIT (OUTPATIENT)
Dept: LAB | Facility: HOSPITAL | Age: 69
End: 2024-12-06
Attending: INTERNAL MEDICINE
Payer: MEDICARE

## 2024-12-06 DIAGNOSIS — I25.10 CAD (CORONARY ARTERY DISEASE): ICD-10-CM

## 2024-12-06 DIAGNOSIS — R07.9 CHEST PAIN: ICD-10-CM

## 2024-12-06 DIAGNOSIS — N18.6 ESRD (END STAGE RENAL DISEASE): ICD-10-CM

## 2024-12-06 DIAGNOSIS — R07.9 CHEST PAIN, UNSPECIFIED TYPE: Primary | ICD-10-CM

## 2024-12-06 LAB
ALBUMIN SERPL BCP-MCNC: 2.9 G/DL (ref 3.5–5.2)
ALP SERPL-CCNC: 58 U/L (ref 40–150)
ALT SERPL W/O P-5'-P-CCNC: 38 U/L (ref 10–44)
ANION GAP SERPL CALC-SCNC: 20 MMOL/L (ref 8–16)
AST SERPL-CCNC: 33 U/L (ref 10–40)
BASOPHILS # BLD AUTO: 0.04 K/UL (ref 0–0.2)
BASOPHILS NFR BLD: 0.5 % (ref 0–1.9)
BILIRUB SERPL-MCNC: 0.4 MG/DL (ref 0.1–1)
BNP SERPL-MCNC: 222 PG/ML (ref 0–99)
BUN SERPL-MCNC: 36 MG/DL (ref 8–23)
CALCIUM SERPL-MCNC: 8.5 MG/DL (ref 8.7–10.5)
CHLORIDE SERPL-SCNC: 96 MMOL/L (ref 95–110)
CO2 SERPL-SCNC: 19 MMOL/L (ref 23–29)
CREAT SERPL-MCNC: 7.8 MG/DL (ref 0.5–1.4)
DIFFERENTIAL METHOD BLD: ABNORMAL
EOSINOPHIL # BLD AUTO: 0.5 K/UL (ref 0–0.5)
EOSINOPHIL NFR BLD: 6.2 % (ref 0–8)
ERYTHROCYTE [DISTWIDTH] IN BLOOD BY AUTOMATED COUNT: 16.1 % (ref 11.5–14.5)
EST. GFR  (NO RACE VARIABLE): 6.9 ML/MIN/1.73 M^2
GLUCOSE SERPL-MCNC: 75 MG/DL (ref 70–110)
HCT VFR BLD AUTO: 35.6 % (ref 40–54)
HGB BLD-MCNC: 11.3 G/DL (ref 14–18)
IMM GRANULOCYTES # BLD AUTO: 0.09 K/UL (ref 0–0.04)
IMM GRANULOCYTES NFR BLD AUTO: 1 % (ref 0–0.5)
LYMPHOCYTES # BLD AUTO: 1.7 K/UL (ref 1–4.8)
LYMPHOCYTES NFR BLD: 19 % (ref 18–48)
MCH RBC QN AUTO: 26.9 PG (ref 27–31)
MCHC RBC AUTO-ENTMCNC: 31.7 G/DL (ref 32–36)
MCV RBC AUTO: 85 FL (ref 82–98)
MONOCYTES # BLD AUTO: 0.8 K/UL (ref 0.3–1)
MONOCYTES NFR BLD: 8.7 % (ref 4–15)
NEUTROPHILS # BLD AUTO: 5.7 K/UL (ref 1.8–7.7)
NEUTROPHILS NFR BLD: 64.6 % (ref 38–73)
NRBC BLD-RTO: 0 /100 WBC
OHS QRS DURATION: 114 MS
OHS QTC CALCULATION: 466 MS
PLATELET # BLD AUTO: 199 K/UL (ref 150–450)
PMV BLD AUTO: 11.6 FL (ref 9.2–12.9)
POTASSIUM SERPL-SCNC: 3.6 MMOL/L (ref 3.5–5.1)
POTASSIUM SERPL-SCNC: 3.8 MMOL/L (ref 3.5–5.1)
PROT SERPL-MCNC: 7.1 G/DL (ref 6–8.4)
RBC # BLD AUTO: 4.2 M/UL (ref 4.6–6.2)
SODIUM SERPL-SCNC: 135 MMOL/L (ref 136–145)
TROPONIN I SERPL DL<=0.01 NG/ML-MCNC: 46 NG/L (ref 0–35)
TROPONIN I SERPL DL<=0.01 NG/ML-MCNC: 49 NG/L (ref 0–35)
TROPONIN I SERPL DL<=0.01 NG/ML-MCNC: 56 NG/L (ref 0–35)
WBC # BLD AUTO: 8.74 K/UL (ref 3.9–12.7)

## 2024-12-06 PROCEDURE — 80053 COMPREHEN METABOLIC PANEL: CPT | Mod: HCNC

## 2024-12-06 PROCEDURE — 83880 ASSAY OF NATRIURETIC PEPTIDE: CPT | Mod: HCNC

## 2024-12-06 PROCEDURE — 85025 COMPLETE CBC W/AUTO DIFF WBC: CPT | Mod: HCNC

## 2024-12-06 PROCEDURE — 25000003 PHARM REV CODE 250: Mod: HCNC

## 2024-12-06 PROCEDURE — 84132 ASSAY OF SERUM POTASSIUM: CPT | Mod: HCNC | Performed by: INTERNAL MEDICINE

## 2024-12-06 PROCEDURE — 93005 ELECTROCARDIOGRAM TRACING: CPT | Mod: HCNC

## 2024-12-06 PROCEDURE — G0378 HOSPITAL OBSERVATION PER HR: HCPCS | Mod: HCNC

## 2024-12-06 PROCEDURE — 93010 ELECTROCARDIOGRAM REPORT: CPT | Mod: HCNC,,, | Performed by: INTERNAL MEDICINE

## 2024-12-06 PROCEDURE — 84484 ASSAY OF TROPONIN QUANT: CPT | Mod: 91,HCNC

## 2024-12-06 PROCEDURE — 99285 EMERGENCY DEPT VISIT HI MDM: CPT | Mod: 25,HCNC

## 2024-12-06 PROCEDURE — 36415 COLL VENOUS BLD VENIPUNCTURE: CPT | Mod: HCNC | Performed by: INTERNAL MEDICINE

## 2024-12-06 PROCEDURE — 25000003 PHARM REV CODE 250: Mod: HCNC | Performed by: STUDENT IN AN ORGANIZED HEALTH CARE EDUCATION/TRAINING PROGRAM

## 2024-12-06 PROCEDURE — 80197 ASSAY OF TACROLIMUS: CPT | Mod: HCNC | Performed by: STUDENT IN AN ORGANIZED HEALTH CARE EDUCATION/TRAINING PROGRAM

## 2024-12-06 PROCEDURE — 84484 ASSAY OF TROPONIN QUANT: CPT | Mod: 91,HCNC | Performed by: STUDENT IN AN ORGANIZED HEALTH CARE EDUCATION/TRAINING PROGRAM

## 2024-12-06 PROCEDURE — 63600175 PHARM REV CODE 636 W HCPCS: Mod: HCNC | Performed by: STUDENT IN AN ORGANIZED HEALTH CARE EDUCATION/TRAINING PROGRAM

## 2024-12-06 RX ORDER — ATORVASTATIN CALCIUM 40 MG/1
80 TABLET, FILM COATED ORAL DAILY
Status: DISCONTINUED | OUTPATIENT
Start: 2024-12-06 | End: 2024-12-07 | Stop reason: HOSPADM

## 2024-12-06 RX ORDER — PREDNISONE 5 MG/1
5 TABLET ORAL DAILY
Status: DISCONTINUED | OUTPATIENT
Start: 2024-12-07 | End: 2024-12-07 | Stop reason: HOSPADM

## 2024-12-06 RX ORDER — FLUTICASONE PROPIONATE 50 MCG
1 SPRAY, SUSPENSION (ML) NASAL DAILY PRN
Status: DISCONTINUED | OUTPATIENT
Start: 2024-12-06 | End: 2024-12-07 | Stop reason: HOSPADM

## 2024-12-06 RX ORDER — IBUPROFEN 200 MG
24 TABLET ORAL
Status: DISCONTINUED | OUTPATIENT
Start: 2024-12-06 | End: 2024-12-07 | Stop reason: HOSPADM

## 2024-12-06 RX ORDER — TACROLIMUS 1 MG/1
1 CAPSULE ORAL EVERY MORNING
Status: DISCONTINUED | OUTPATIENT
Start: 2024-12-06 | End: 2024-12-07 | Stop reason: HOSPADM

## 2024-12-06 RX ORDER — NIFEDIPINE 30 MG/1
30 TABLET, EXTENDED RELEASE ORAL
Status: COMPLETED | OUTPATIENT
Start: 2024-12-06 | End: 2024-12-06

## 2024-12-06 RX ORDER — CLOPIDOGREL BISULFATE 75 MG/1
75 TABLET ORAL DAILY
Status: DISCONTINUED | OUTPATIENT
Start: 2024-12-06 | End: 2024-12-07 | Stop reason: HOSPADM

## 2024-12-06 RX ORDER — NITROGLYCERIN 0.4 MG/1
0.4 TABLET SUBLINGUAL EVERY 5 MIN PRN
Status: DISCONTINUED | OUTPATIENT
Start: 2024-12-06 | End: 2024-12-07 | Stop reason: HOSPADM

## 2024-12-06 RX ORDER — TRAMADOL HYDROCHLORIDE 50 MG/1
50 TABLET ORAL EVERY 8 HOURS PRN
Status: DISCONTINUED | OUTPATIENT
Start: 2024-12-06 | End: 2024-12-07 | Stop reason: HOSPADM

## 2024-12-06 RX ORDER — GLUCAGON 1 MG
1 KIT INJECTION
Status: DISCONTINUED | OUTPATIENT
Start: 2024-12-06 | End: 2024-12-07 | Stop reason: HOSPADM

## 2024-12-06 RX ORDER — AMIODARONE HYDROCHLORIDE 200 MG/1
200 TABLET ORAL
Status: COMPLETED | OUTPATIENT
Start: 2024-12-06 | End: 2024-12-06

## 2024-12-06 RX ORDER — PANTOPRAZOLE SODIUM 40 MG/1
40 TABLET, DELAYED RELEASE ORAL DAILY
Status: DISCONTINUED | OUTPATIENT
Start: 2024-12-07 | End: 2024-12-07 | Stop reason: HOSPADM

## 2024-12-06 RX ORDER — IPRATROPIUM BROMIDE AND ALBUTEROL SULFATE 2.5; .5 MG/3ML; MG/3ML
3 SOLUTION RESPIRATORY (INHALATION) EVERY 4 HOURS PRN
Status: DISCONTINUED | OUTPATIENT
Start: 2024-12-06 | End: 2024-12-07 | Stop reason: HOSPADM

## 2024-12-06 RX ORDER — IBUPROFEN 200 MG
16 TABLET ORAL
Status: DISCONTINUED | OUTPATIENT
Start: 2024-12-06 | End: 2024-12-07 | Stop reason: HOSPADM

## 2024-12-06 RX ORDER — DOXAZOSIN 2 MG/1
4 TABLET ORAL EVERY 12 HOURS
Status: DISCONTINUED | OUTPATIENT
Start: 2024-12-06 | End: 2024-12-06

## 2024-12-06 RX ORDER — ACETAMINOPHEN 500 MG
500 TABLET ORAL EVERY 6 HOURS PRN
Status: DISCONTINUED | OUTPATIENT
Start: 2024-12-06 | End: 2024-12-07 | Stop reason: HOSPADM

## 2024-12-06 RX ORDER — SODIUM CHLORIDE 0.9 % (FLUSH) 0.9 %
10 SYRINGE (ML) INJECTION EVERY 12 HOURS PRN
Status: DISCONTINUED | OUTPATIENT
Start: 2024-12-06 | End: 2024-12-07 | Stop reason: HOSPADM

## 2024-12-06 RX ADMIN — APIXABAN 5 MG: 5 TABLET, FILM COATED ORAL at 08:12

## 2024-12-06 RX ADMIN — ATORVASTATIN CALCIUM 80 MG: 40 TABLET, FILM COATED ORAL at 06:12

## 2024-12-06 RX ADMIN — APIXABAN 5 MG: 5 TABLET, FILM COATED ORAL at 11:12

## 2024-12-06 RX ADMIN — AMIODARONE HYDROCHLORIDE 200 MG: 200 TABLET ORAL at 11:12

## 2024-12-06 RX ADMIN — TACROLIMUS 1 MG: 1 CAPSULE ORAL at 06:12

## 2024-12-06 RX ADMIN — CLOPIDOGREL BISULFATE 75 MG: 75 TABLET ORAL at 11:12

## 2024-12-06 RX ADMIN — NIFEDIPINE 30 MG: 30 TABLET, FILM COATED, EXTENDED RELEASE ORAL at 11:12

## 2024-12-06 NOTE — SUBJECTIVE & OBJECTIVE
"Past Medical History:   Diagnosis Date    Atrial fibrillation     CAD (coronary artery disease) 2006    MI in 2006    CHF (congestive heart failure) 2017    CKD (chronic kidney disease) stage 3, GFR 30-59 ml/min     Dr. Latif.  in transplanted kidney    CKD (chronic kidney disease) stage 5, GFR less than 15 ml/min 02/02/2024    COVID-19 02/07/2023    Diverticulosis     Hyperlipidemia     Hypertension     Keloid cicatrix     Metabolic bone disease     Other emphysema 10/01/2019    Pericarditis     S/P kidney transplant 1992    x2 (1992 & 2005),    Thrombocytopenia     Thyroid disease     Tobacco use 09/05/2014       Past Surgical History:   Procedure Laterality Date    AV FISTULA PLACEMENT Left 12/18/2023    Procedure: CREATION, AV FISTULA;  Surgeon: JUANI Melendez III, MD;  [facility];  Service: Vascular;  Laterality: Left;  LUE AVF creation vs AVG insertion    CARDIOVERSION  04/30/15    CHOLECYSTECTOMY      COLONOSCOPY  April 20, 2011    Diverticulosis, repeat recommended in 3 yrs., repeat colonoscopy 2014 revealed 2 polyps.  He should return in 5 years.    COLONOSCOPY N/A 3/13/2020    Procedure: COLONOSCOPY;  Surgeon: Chon Casper MD;  Location: [facility] (4TH FLR);  Service: Endoscopy;  Laterality: N/A;  ok to hold coumadin x5days-see telephone encounter 2/4/20-tb    COLONOSCOPY N/A 2/4/2021    Procedure: COLONOSCOPY;  Surgeon: Chon Casper MD;  Location: [facility] (4TH FLR);  Service: Endoscopy;  Laterality: N/A;  Eliquis - per Dr. GAVIN Blunt ok to hold x 2 days and "restarted asap"- ERW  last prep "poor", [id number] ordered/ Pt requests Dr. Casper  prep ins. mailed - COVID screening on 2/1/21 PCW- ERW    COLONOSCOPY N/A 3/3/2021    Procedure: COLONOSCOPY;  Surgeon: Chon Casper MD;  Location: [facility] (4TH FLR);  Service: Endoscopy;  Laterality: N/A;  Patient with his second poor bowel pre and has poor prep today.  If patient not intersted or can't get colonoscopy tomorrow will " need constipation bowel prep and will need to restart his Eliquis today.Thanks,Chon     per Dr Lopez to hold Eliquis 2 days prior      COVID     CORONARY ANGIOGRAPHY N/A 2/28/2024    Procedure: ANGIOGRAM, CORONARY ARTERY;  Surgeon: Tylor Lincoln MD;  Location: [facility] CATH LAB;  Service: Cardiology;  Laterality: N/A;    CORONARY ANGIOPLASTY WITH STENT PLACEMENT  9/13/2006    FISTULOGRAM N/A 2/19/2024    Procedure: Fistulogram;  Surgeon: JUANI Melendez III, MD;  Location: [facility] CATH LAB;  Service: Peripheral Vascular;  Laterality: N/A;    FISTULOGRAM, WITH PTA Left 6/3/2024    Procedure: FISTULOGRAM, WITH PTA;  Surgeon: JUANI Melendez III, MD;  Location: [facility] OR [facility] FLR;  Service: Vascular;  Laterality: Left;  mGy: 51.58  GyCm2: 6.8285  Fluoro time: 5.1 min  Contrast: 28 cc    IRRIGATION AND DEBRIDEMENT Right 8/30/2023    Procedure: IRRIGATION AND DEBRIDEMENT, RIGHT MIDDLE FINGER;  Surgeon: Martin Larsen MD;  Location: [facility] OR [facility] FLR;  Service: Orthopedics;  Laterality: Right;    KIDNEY TRANSPLANT  2005    LEFT HEART CATHETERIZATION N/A 2/26/2024    Procedure: Left heart cath;  Surgeon: Tylor Lincoln MD;  Location: [facility] CATH LAB;  Service: Cardiology;  Laterality: N/A;    PARATHYROIDECTOMY      PERCUTANEOUS TRANSLUMINAL ANGIOPLASTY OF ARTERIOVENOUS FISTULA Left 2/19/2024    Procedure: PTA, AV FISTULA;  Surgeon: JUANI Melendez III, MD;  Location: [facility] CATH LAB;  Service: Peripheral Vascular;  Laterality: Left;    STENT, DRUG ELUTING, SINGLE VESSEL, CORONARY N/A 2/28/2024    Procedure: Stent, Drug Eluting, Single Vessel, Coronary;  Surgeon: Tylor Lincoln MD;  Location: [facility] CATH LAB;  Service: Cardiology;  Laterality: N/A;    TREATMENT OF CARDIAC ARRHYTHMIA N/A 3/28/2022    Procedure: CARDIOVERSION;  Surgeon: Migue Blunt MD;  Location: [facility] EP LAB;  Service: Cardiology;  Laterality: N/A;  AF, DCC, MAC, GP, 3 PREP*RODRIGO deferred pt 100% compliant*    VENOPLASTY  6/3/2024    Procedure:  ANGIOPLASTY, VEIN;  Surgeon: JUANI Melendez III, MD;  Location: [facility] OR [address] [place] [address];  Service: Vascular;;  Left subclavian       Review of patient's allergies indicates:   Allergen Reactions    Ace inhibitors Swelling    Verapamil Other (See Comments)     Other reaction(s): Unknown    Povidone-iodine Itching       No current facility-administered medications on file prior to encounter.     Current Outpatient Medications on File Prior to Encounter   Medication Sig    acetaminophen (TYLENOL) 500 MG tablet Take 1 tablet (500 mg total) by mouth every 6 (six) hours as needed for Pain.    albuterol (VENTOLIN HFA) 90 mcg/actuation inhaler Inhale 2 puffs into the lungs every 6 (six) hours as needed for Wheezing or Shortness of Breath. Rescue    albuterol-ipratropium (DUO-NEB) 2.5 mg-0.5 mg/3 mL nebulizer solution Take 3 mLs by nebulization every 4 (four) hours as needed for Wheezing. Rescue    amiodarone (PACERONE) 200 MG Tab TAKE 1 TABLET BY MOUTH EVERY DAY    atorvastatin (LIPITOR) 80 MG tablet Take 1 tablet (80 mg total) by mouth once daily.    b complex vitamins (B COMPLEX-VITAMIN B12) tablet Take 1 tablet by mouth once daily.    calcium carbonate (CALCIUM 600 ORAL) Take 1 tablet by mouth 2 (two) times a day.    CHOLECALCIFEROL, VITAMIN D3, ORAL Take 2 tablets by mouth 2 (two) times daily.    clopidogreL (PLAVIX) 75 mg tablet Take 1 tablet (75 mg total) by mouth once daily.    doxazosin (CARDURA) 4 MG tablet Take 1 tablet (4 mg total) by mouth every 12 (twelve) hours.    ELIQUIS 5 mg Tab TAKE 1 TABLET BY MOUTH TWICE A DAY    famotidine (PEPCID) 20 MG tablet TAKE 1 TABLET (20 MG TOTAL) BY MOUTH ONCE DAILY. ON DIALYSIS DAYS, PLEASE TAKE THIS MEDICINE AFTER DIALYSIS.    fexofenadine HCl (ALLEGRA ORAL) Take 1 tablet by mouth daily as needed (allergies).    fluticasone furoate-vilanteroL (BREO) 100-25 mcg/dose diskus inhaler Inhale 1 puff into the lungs once daily. Controller. Use this inhaler every day.    fluticasone  propionate (FLONASE) 50 mcg/actuation nasal spray 1 spray (50 mcg total) by Each Nostril route daily as needed for Allergies.    gabapentin (NEURONTIN) 100 MG capsule Take 1 capsule (100 mg total) by mouth as needed (pain).    metoprolol succinate (TOPROL-XL) 25 MG 24 hr tablet Take 0.5 tablets (12.5 mg total) by mouth once daily.    multivitamin (THERAGRAN) per tablet Take 1 tablet by mouth Daily.    NIFEdipine (PROCARDIA-XL) 30 MG (OSM) 24 hr tablet Take 1 tablet (30 mg total) by mouth 2 (two) times a day.    nitroGLYCERIN (NITROSTAT) 0.4 MG SL tablet Place 1 tablet (0.4 mg total) under the tongue every 5 (five) minutes as needed for Chest pain.    omeprazole (PRILOSEC) 20 MG capsule Take 1 capsule (20 mg total) by mouth daily as needed (heartburn).    predniSONE (DELTASONE) 5 MG tablet Take 1 tablet (5 mg total) by mouth once daily.    pulse oximeter (PULSE OXIMETER) device by Apply Externally route 2 (two) times a day. Use twice daily at 8 AM and 3 PM and [facility] as directed.    tacrolimus (PROGRAF) 1 MG Cap Take 3 capsules (3 mg total) by mouth every 12 (twelve) hours for 60 days, THEN 2 capsules (2 mg total) every 12 (twelve) hours for 60 days, THEN 1 capsule (1 mg total) every 12 (twelve) hours for 60 days, THEN 1 capsule (1 mg total) once daily.    traMADoL (ULTRAM) 50 mg tablet Take 1 tablet (50 mg total) by mouth every 6 (six) hours as needed for Pain.     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     Patient is currently retired   Substance and Sexual Activity    Alcohol use: No    Drug use: No    Sexual activity: Yes     Partners: Female     Review of Systems   Constitutional: Negative for chills and fever.   Cardiovascular:  Negative for chest pain, orthopnea and palpitations.   Respiratory:  Negative for cough and shortness of breath.    Gastrointestinal:  Negative for abdominal pain.   Neurological:  Negative for dizziness, headaches and light-headedness.   Psychiatric/Behavioral:  Negative for altered mental status.      Objective:     Vital Signs (Most Recent):  Temp: 98 °F (36.7 °C) (12/06/24 1715)  Pulse: 60 (12/06/24 1715)  Resp: 18 (12/06/24 1715)  BP: 135/65 (12/06/24 1715)  SpO2: 100 % (12/06/24 1715) Vital Signs (24h Range):  Temp:  [97.5 °F (36.4 °C)-98 °F (36.7 °C)] 98 °F (36.7 °C)  Pulse:  [50-60] 60  Resp:  [17-22] 18  SpO2:  [100 %] 100 %  BP: (135-176)/(62-71) 135/65     Weight: 76.2 kg (168 lb)  Body mass index is 22.16 kg/m².    SpO2: 100 %       No intake or output data in the 24 hours ending 12/06/24 1742    Lines/Drains/Airways       Peripheral Intravenous Line  Duration                  Hemodialysis AV Fistula Left upper arm -- days         Hemodialysis AV Fistula 02/19/24 0924 Left upper arm 291 days         Peripheral IV - Single Lumen 12/06/24 1000 20 G Right Forearm <1 day                     Physical Exam  Vitals and nursing note reviewed.   Constitutional:       General: He is not in acute distress.     Appearance: Normal appearance. He is not ill-appearing or toxic-appearing.   HENT:      Head: Normocephalic and atraumatic.      Mouth/Throat:      Mouth: Mucous membranes are moist.   Cardiovascular:      Rate and Rhythm: Normal rate and regular rhythm.      Heart sounds: No murmur heard.     No friction rub. No gallop.   Pulmonary:      Effort: Pulmonary effort is normal. No respiratory distress.     "  Breath sounds: Normal breath sounds.   Abdominal:      Palpations: Abdomen is soft.   Musculoskeletal:      Right lower leg: No edema.      Left lower leg: No edema.      Comments: Left arm fistula, good thrill   Skin:     General: Skin is warm.   Neurological:      Mental Status: He is alert and oriented to person, place, and time. Mental status is at baseline.          Significant Labs: ABG: No results for input(s): "PH", "PCO2", "HCO3", "POCSATURATED", "BE" in the last 48 hours., Blood Culture: No results for input(s): "LABBLOO" in the last 48 hours., BMP:   Recent Labs   Lab 12/06/24  0800 12/06/24  1042   GLU  --  75   NA  --  135*   K 3.6 3.8   CL  --  96   CO2  --  19*   BUN  --  36*   CREATININE  --  7.8*   CALCIUM  --  8.5*   , CMP   Recent Labs   Lab 12/06/24  0800 12/06/24  1042   NA  --  135*   K 3.6 3.8   CL  --  96   CO2  --  19*   GLU  --  75   BUN  --  36*   CREATININE  --  7.8*   CALCIUM  --  8.5*   PROT  --  7.1   ALBUMIN  --  2.9*   BILITOT  --  0.4   ALKPHOS  --  58   AST  --  33   ALT  --  38   ANIONGAP  --  20*   , CBC   Recent Labs   Lab 12/06/24  1042   WBC 8.74   HGB 11.3*   HCT 35.6*      , INR No results for input(s): "INR", "PROTIME" in the last 48 hours., Lipid Panel No results for input(s): "CHOL", "HDL", "LDLCALC", "TRIG", "CHOLHDL" in the last 48 hours.,   Pathology Results  (Last 10 years)                 03/03/21 1340  Specimen to Pathology, Surgery Gastrointestinal tract Final result    Narrative:  Pre-op Diagnosis: History of adenomatous polyp of colon   [Z86.010]   Current use of long term anticoagulation [Z79.01]   Procedure(s):   COLONOSCOPY   Number of specimens: 1   Name of specimens:   jar1) sigmoid colon biopsy r/o IBD/dysplasia   Release to patient->7 Day Delay   Specimen total (fresh, frozen, permanent):->1           , and Troponin No results for input(s): "TROPONINI" in the last 48 hours.   "

## 2024-12-06 NOTE — ASSESSMENT & PLAN NOTE
Patient's blood pressure range in the last 24 hours was: BP  Min: 138/63  Max: 176/71.The patient's inpatient anti-hypertensive regimen is listed below:  Current Antihypertensives  nitroGLYCERIN SL tablet 0.4 mg, Every 5 min PRN, Sublingual    Plan  - BP is controlled, no changes needed to their regimen  - Hold doxazosin as patient reports low BP at home for past few days, unclear if on nifedipine

## 2024-12-06 NOTE — H&P
[facility] - Emergency Dept  [facility] Medicine  History & Physical    Patient Name: Ibrahima Phelps  MRN: [id number]  Patient Class: OP- Observation  Admission Date: 12/6/2024  Attending Physician: Byron Rosenberg MD   Primary Care Provider: Anatoliy Colindres MD         Patient information was obtained from patient, spouse/SO, and ER records.     Subjective:     Principal Problem:CAD (coronary artery disease)    Chief Complaint:   Chief Complaint   Patient presents with    Chest Pain     Patient arrived from endoscopy following acute onset of 8/10 left anterior thoracic chest pain. Patient reports self-administering 1 SL NTG table with reported relief.         HPI: Ibrahima Phelps is a 69 y.o. male a PMHx significant for CAD s/p stent placements (1 stent placed in 2006, 2 stents placed in 02/2024), CKD s/p renal transplant (in 1992) on hemodialysis (TTS), HTN, CHF presents to the ED w/ complaints of left sided pressure like CP that resolved with nitro.    History obtained from patient, wife at bedside and ED notes. Patient states that at about 06:00 this morning he experienced a 10 minute episode of left-sided CP did not radiate as well as a brief episode of SOB and lightheadedness at that time; patient took 1 sublingual nitroglycerin which completely alleviated his pain has had no symptoms since this occurrence.  He has been holding his Eliquis for 2 days and Plavix for 5 days in anticipation of a colonoscopy today. He went for surveillance colonoscopy this morning as pain had resolved but was sent to be evaluated to ER and [id number] was cancelled. Colonoscopy was for colon polyps that were found in the past.  Patient denies HA, syncope, f/c, cough, hemoptysis, recent illnesses, flank pain, abdominal pain N/V/D, dysuria/hematuria, lower extremity edema/erythema.     Trop 46 > 49 > 56  EKG with sinus bradycardia without ST/T wave changes  Cxr normal  HM to admit for obs.    Past Medical History:   Diagnosis Date    Atrial  "fibrillation     CAD (coronary artery disease) 2006    MI in 2006    CHF (congestive heart failure) 2017    CKD (chronic kidney disease) stage 3, GFR 30-59 ml/min     Dr. Latif.  in transplanted kidney    CKD (chronic kidney disease) stage 5, GFR less than 15 ml/min 02/02/2024    COVID-19 02/07/2023    Diverticulosis     Hyperlipidemia     Hypertension     Keloid cicatrix     Metabolic bone disease     Other emphysema 10/01/2019    Pericarditis     S/P kidney transplant 1992    x2 (1992 & 2005),    Thrombocytopenia     Thyroid disease     Tobacco use 09/05/2014       Past Surgical History:   Procedure Laterality Date    AV FISTULA PLACEMENT Left 12/18/2023    Procedure: CREATION, AV FISTULA;  Surgeon: JUANI Melendez III, MD;  Location: [facility] OR 2ND FLR;  Service: Vascular;  Laterality: Left;  LUE AVF creation vs AVG insertion    CARDIOVERSION  04/30/15    CHOLECYSTECTOMY      COLONOSCOPY  April 20, 2011    Diverticulosis, repeat recommended in 3 yrs., repeat colonoscopy 2014 revealed 2 polyps.  He should return in 5 years.    COLONOSCOPY N/A 3/13/2020    Procedure: COLONOSCOPY;  Surgeon: Chon Casper MD;  Location: [facility] (4TH FLR);  Service: Endoscopy;  Laterality: N/A;  ok to hold coumadin x5days-see telephone encounter 2/4/20-tb    COLONOSCOPY N/A 2/4/2021    Procedure: COLONOSCOPY;  Surgeon: Chon Casper MD;  Location: [facility] (4TH FLR);  Service: Endoscopy;  Laterality: N/A;  Eliquis - per Dr. GAVIN Blunt ok to hold x 2 days and "restarted asap"- ERW  last prep "poor", [id number] ordered/ Pt requests Dr. Casper  prep ins. mailed - COVID screening on 2/1/21 PCW- ERW    COLONOSCOPY N/A 3/3/2021    Procedure: COLONOSCOPY;  Surgeon: Chon Casper MD;  Location: [facility] ENDO (4TH FLR);  Service: Endoscopy;  Laterality: N/A;  Patient with his second poor bowel pre and has poor prep today.  If patient not intersted or can't get colonoscopy tomorrow will need constipation bowel prep and will need to " restart his Eliquis today.Thanks,Chon     per Dr Lopez to hold Eliquis 2 days prior      COVID     CORONARY ANGIOGRAPHY N/A 2/28/2024    Procedure: ANGIOGRAM, CORONARY ARTERY;  Surgeon: Tylor Lincoln MD;  Location: [facility] CATH LAB;  Service: Cardiology;  Laterality: N/A;    CORONARY ANGIOPLASTY WITH STENT PLACEMENT  9/13/2006    FISTULOGRAM N/A 2/19/2024    Procedure: Fistulogram;  Surgeon: JUANI Melendez III, MD;  Location: [facility] CATH LAB;  Service: Peripheral Vascular;  Laterality: N/A;    FISTULOGRAM, WITH PTA Left 6/3/2024    Procedure: FISTULOGRAM, WITH PTA;  Surgeon: JUANI Melendez III, MD;  Location: [facility] OR [facility] FLR;  Service: Vascular;  Laterality: Left;  mGy: 51.58  GyCm2: 6.8285  Fluoro time: 5.1 min  Contrast: 28 cc    IRRIGATION AND DEBRIDEMENT Right 8/30/2023    Procedure: IRRIGATION AND DEBRIDEMENT, RIGHT MIDDLE FINGER;  Surgeon: Martin Larsen MD;  Location: [facility] OR [facility] FLR;  Service: Orthopedics;  Laterality: Right;    KIDNEY TRANSPLANT  2005    LEFT HEART CATHETERIZATION N/A 2/26/2024    Procedure: Left heart cath;  Surgeon: Tylor Lincoln MD;  Location: [facility] CATH LAB;  Service: Cardiology;  Laterality: N/A;    PARATHYROIDECTOMY      PERCUTANEOUS TRANSLUMINAL ANGIOPLASTY OF ARTERIOVENOUS FISTULA Left 2/19/2024    Procedure: PTA, AV FISTULA;  Surgeon: JUANI Melendez III, MD;  Location: [facility] CATH LAB;  Service: Peripheral Vascular;  Laterality: Left;    STENT, DRUG ELUTING, SINGLE VESSEL, CORONARY N/A 2/28/2024    Procedure: Stent, Drug Eluting, Single Vessel, Coronary;  Surgeon: Tylor Lincoln MD;  Location: [facility] CATH LAB;  Service: Cardiology;  Laterality: N/A;    TREATMENT OF CARDIAC ARRHYTHMIA N/A 3/28/2022    Procedure: CARDIOVERSION;  Surgeon: Migue Blunt MD;  Location: [facility] EP LAB;  Service: Cardiology;  Laterality: N/A;  AF, DCC, MAC, GP, 3 PREP*RODRIGO deferred pt 100% compliant*    VENOPLASTY  6/3/2024    Procedure: ANGIOPLASTY, VEIN;  Surgeon: JUANI Melendez  III, MD;  Location: [facility] OR [address] [place] [address];  Service: Vascular;;  Left subclavian       Review of patient's allergies indicates:   Allergen Reactions    Ace inhibitors Swelling    Verapamil Other (See Comments)     Other reaction(s): Unknown    Povidone-iodine Itching       No current facility-administered medications on file prior to encounter.     Current Outpatient Medications on File Prior to Encounter   Medication Sig    acetaminophen (TYLENOL) 500 MG tablet Take 1 tablet (500 mg total) by mouth every 6 (six) hours as needed for Pain.    albuterol (VENTOLIN HFA) 90 mcg/actuation inhaler Inhale 2 puffs into the lungs every 6 (six) hours as needed for Wheezing or Shortness of Breath. Rescue    albuterol-ipratropium (DUO-NEB) 2.5 mg-0.5 mg/3 mL nebulizer solution Take 3 mLs by nebulization every 4 (four) hours as needed for Wheezing. Rescue    amiodarone (PACERONE) 200 MG Tab TAKE 1 TABLET BY MOUTH EVERY DAY    atorvastatin (LIPITOR) 80 MG tablet Take 1 tablet (80 mg total) by mouth once daily.    b complex vitamins (B COMPLEX-VITAMIN B12) tablet Take 1 tablet by mouth once daily.    calcium carbonate (CALCIUM 600 ORAL) Take 1 tablet by mouth 2 (two) times a day.    CHOLECALCIFEROL, VITAMIN D3, ORAL Take 2 tablets by mouth 2 (two) times daily.    clopidogreL (PLAVIX) 75 mg tablet Take 1 tablet (75 mg total) by mouth once daily.    doxazosin (CARDURA) 4 MG tablet Take 1 tablet (4 mg total) by mouth every 12 (twelve) hours.    ELIQUIS 5 mg Tab TAKE 1 TABLET BY MOUTH TWICE A DAY    famotidine (PEPCID) 20 MG tablet TAKE 1 TABLET (20 MG TOTAL) BY MOUTH ONCE DAILY. ON DIALYSIS DAYS, PLEASE TAKE THIS MEDICINE AFTER DIALYSIS.    fexofenadine HCl (ALLEGRA ORAL) Take 1 tablet by mouth daily as needed (allergies).    fluticasone furoate-vilanteroL (BREO) 100-25 mcg/dose diskus inhaler Inhale 1 puff into the lungs once daily. Controller. Use this inhaler every day.    fluticasone propionate (FLONASE) 50 mcg/actuation nasal spray 1  spray (50 mcg total) by Each Nostril route daily as needed for Allergies.    gabapentin (NEURONTIN) 100 MG capsule Take 1 capsule (100 mg total) by mouth as needed (pain).    metoprolol succinate (TOPROL-XL) 25 MG 24 hr tablet Take 0.5 tablets (12.5 mg total) by mouth once daily.    multivitamin (THERAGRAN) per tablet Take 1 tablet by mouth Daily.    NIFEdipine (PROCARDIA-XL) 30 MG (OSM) 24 hr tablet Take 1 tablet (30 mg total) by mouth 2 (two) times a day.    nitroGLYCERIN (NITROSTAT) 0.4 MG SL tablet Place 1 tablet (0.4 mg total) under the tongue every 5 (five) minutes as needed for Chest pain.    omeprazole (PRILOSEC) 20 MG capsule Take 1 capsule (20 mg total) by mouth daily as needed (heartburn).    predniSONE (DELTASONE) 5 MG tablet Take 1 tablet (5 mg total) by mouth once daily.    pulse oximeter (PULSE OXIMETER) device by Apply Externally route 2 (two) times a day. Use twice daily at 8 AM and 3 PM and [facility] as directed.    tacrolimus (PROGRAF) 1 MG Cap Take 3 capsules (3 mg total) by mouth every 12 (twelve) hours for 60 days, THEN 2 capsules (2 mg total) every 12 (twelve) hours for 60 days, THEN 1 capsule (1 mg total) every 12 (twelve) hours for 60 days, THEN 1 capsule (1 mg total) once daily.    traMADoL (ULTRAM) 50 mg tablet Take 1 tablet (50 mg total) by mouth every 6 (six) hours as needed for Pain.     Family History       Problem Relation (Age of Onset)    Heart disease Father    Kidney disease Sister, Brother    Kidney failure Sister, Brother    No Known Problems Sister, Brother, Sister, Sister    Thyroid disease Mother          Tobacco Use    Smoking status: Every Day     Current packs/day: 0.00     Average packs/day: 0.5 packs/day for 40.0 years (20.0 ttl pk-yrs)     Types: Cigarettes     Start date: 1982     Last attempt to quit: 2022     Years since quittin.7    Smokeless tobacco: Never    Tobacco comments:     The patient works as a  driving 18  wheelers. He is not exercising.     Patient is currently retired   Substance and Sexual Activity    Alcohol use: No    Drug use: No    Sexual activity: Yes     Partners: Female     Review of Systems   Constitutional:  Negative for activity change, appetite change, fatigue and fever.   Respiratory:  Positive for chest tightness and shortness of breath. Negative for apnea and stridor.    Cardiovascular:  Positive for chest pain. Negative for palpitations and leg swelling.   Gastrointestinal:  Negative for abdominal distention and abdominal pain.   Endocrine: Negative for polyphagia and polyuria.   Genitourinary:  Negative for dysuria.   Musculoskeletal:  Negative for arthralgias.   Neurological:  Negative for syncope.   Psychiatric/Behavioral:  Negative for agitation and behavioral problems.      Objective:     Vital Signs (Most Recent):  Temp: 97.9 °F (36.6 °C) (12/06/24 1500)  Pulse: (!) 53 (12/06/24 1600)  Resp: (!) 22 (12/06/24 1600)  BP: (!) 143/62 (12/06/24 1600)  SpO2: 100 % (12/06/24 1600) Vital Signs (24h Range):  Temp:  [97.5 °F (36.4 °C)-97.9 °F (36.6 °C)] 97.9 °F (36.6 °C)  Pulse:  [50-56] 53  Resp:  [17-22] 22  SpO2:  [100 %] 100 %  BP: (138-176)/(62-71) 143/62     Weight: 76.2 kg (168 lb)  Body mass index is 22.16 kg/m².     Physical Exam  Constitutional:       Appearance: Normal appearance.   HENT:      Head: Normocephalic and atraumatic.      Mouth/Throat:      Mouth: Mucous membranes are moist.      Pharynx: Oropharynx is clear.   Eyes:      Extraocular Movements: Extraocular movements intact.   Cardiovascular:      Rate and Rhythm: Regular rhythm. Bradycardia present.   Pulmonary:      Effort: Pulmonary effort is normal. No respiratory distress.      Breath sounds: No wheezing.   Abdominal:      General: There is no distension.   Musculoskeletal:         General: No swelling or tenderness.      Cervical back: Normal range of motion. No rigidity or tenderness.      Right lower leg: No edema.      Left  lower leg: No edema.   Skin:     Coloration: Skin is not jaundiced.   Neurological:      General: No focal deficit present.      Mental Status: He is alert and oriented to person, place, and time.   Psychiatric:         Mood and Affect: Mood normal.         Behavior: Behavior normal.                Significant Labs: All pertinent labs within the past 24 hours have been reviewed.    Significant Imaging: I have reviewed all pertinent imaging results/findings within the past 24 hours.  Assessment/Plan:     * Chest pain in patient with CAD (coronary artery disease)  Patient with known CAD s/p stent placement, which is controlled Will continue Plavix and Statin and monitor for S/Sx of angina/ACS. Continue to monitor on telemetry.     EKG non ischemic   Elevated troponin 46 > 49 > 56 - could be related to CKD and iso holding plavix and   Trend troponin  C/w statin and Plavix  ED continued Eliquis  Cardiology consult    Paroxysmal atrial fibrillation   Patient is currently in sinus rhythm. [id number] Score: 2. The patients heart rate in the last 24 hours is as follows:  Pulse  Min: 50  Max: 56     Antiarrhythmics       Anticoagulants  apixaban tablet 5 mg, 2 times daily, Oral    Plan  - Replete lytes with a goal of K>4, Mg >2  - Patient is anticoagulated, see medications listed above.  - Patient's afib is currently controlled        H/O kidney transplant  Nephrology consulted  Tacro level daily   Tacro 1 mg daily  C/w home prednisone  HD dependent patient - TTS - last session Wednesday as he was getting colonoscopy prep - HD per renal      Mixed hyperlipidemia  C/w home statin      Primary hypertension  Patient's blood pressure range in the last 24 hours was: BP  Min: 138/63  Max: 176/71.The patient's inpatient anti-hypertensive regimen is listed below:  Current Antihypertensives  nitroGLYCERIN SL tablet 0.4 mg, Every 5 min PRN, Sublingual    Plan  - BP is controlled, no changes needed to their regimen  - Hold doxazosin as  patient reports low BP at home for past few days, unclear if on nifedipine      VTE Risk Mitigation (From admission, onward)           Ordered     apixaban tablet 5 mg  2 times daily         12/06/24 1057                       On 12/06/2024, patient should be placed in hospital observation services under my care.             Byron Rosenberg MD  Department of Hospital Medicine  [facility]

## 2024-12-06 NOTE — ED TRIAGE NOTES
Ibrahima Phelps, a 69 y.o. male presents to the ED w/ complaint of left anterior chest discomfort. Pt sent from endoscopy for evaluation. Pt reports chest discomfort started around 0600 this morning, took 1x NTG SL tab and discomfort was relieved. HX of Afib. Tues/thurs/sat dialysis. AAOx4.    Triage note:  Chief Complaint   Patient presents with    Chest Pain     Patient arrived from endoscopy following acute onset of 8/10 left anterior thoracic chest pain. Patient reports self-administering 1 SL NTG table with reported relief.      Review of patient's allergies indicates:   Allergen Reactions    Ace inhibitors Swelling    Verapamil Other (See Comments)     Other reaction(s): Unknown    Povidone-iodine Itching     Past Medical History:   Diagnosis Date    Atrial fibrillation     CAD (coronary artery disease) 2006    MI in 2006    CHF (congestive heart failure) 2017    CKD (chronic kidney disease) stage 3, GFR 30-59 ml/min     Dr. Latif.  in transplanted kidney    CKD (chronic kidney disease) stage 5, GFR less than 15 ml/min 02/02/2024    COVID-19 02/07/2023    Diverticulosis     Hyperlipidemia     Hypertension     Keloid cicatrix     Metabolic bone disease     Other emphysema 10/01/2019    Pericarditis     S/P kidney transplant 1992    x2 (1992 & 2005),    Thrombocytopenia     Thyroid disease     Tobacco use 09/05/2014

## 2024-12-06 NOTE — PROGRESS NOTES
"Procedure: Colonoscopy    Diagnosis: Surveillance colonoscopy - Hx of colon polyps    Procedure Timin-12 weeks    *If within 4 weeks selected, please flavio as high priority*    *If greater than 12 weeks, please select "5-12 weeks" and delay sending until 3 months prior to requested date*     Location: Hospital Based ([address],  [place], [facility])    Additional Scheduling Information: Blood thinners    Prep Specifications:Extended/Constipation prep    Is the patient taking a GLP-1 Agonist:no (but please ask)    Have you attached a patient to this message: yes     "

## 2024-12-06 NOTE — PROVIDER PROGRESS NOTES - EMERGENCY DEPT.
"Encounter Date: 12/6/2024    ED Physician Progress Notes        Physician Note:   ED Sanders of Care Note  3:33 PM 12/6/2024  Ibrahima Phelps is a 69 y.o. male who presented to the ED on 12/6/2024 and has been managed by Dr. Monte, who reports patient C/O chest pain. I assumed care of patient from off-going ED physician team at 3:33 PM pending 2nd troponin and admission.    On my evaluation, Ibrahima Phelps appears well and is hemodynamically stable. Thus far, Ibrahima Phelps has received:  Medications  clopidogreL tablet 75 mg (75 mg Oral Given 12/6/24 1119)  apixaban tablet 5 mg (5 mg Oral Given 12/6/24 1119)  amiodarone tablet 200 mg (200 mg Oral Given 12/6/24 1119)  NIFEdipine 24 hr tablet 30 mg (30 mg Oral Given 12/6/24 1124)    On my exam, I appreciate:  BP (!) 146/67   Pulse (!) 52   Temp 97.9 °F (36.6 °C) (Oral)   Resp 20   Ht 6' 1" (1.854 m)   Wt 76.2 kg (168 lb)   SpO2 100%   BMI 22.16 kg/m²           Additional ED course:  I received sign-out from the morning team, and introduced myself to the patient and informed them that they were going to be admitted for observation.    Disposition:  Admit  I have discussed and counseled Ibrahima Phelps regarding exam, results, diagnosis, treatment, and plan.  ______________________  Jose Phelps MD Emergency Medicine Physician  12/6/2024              "

## 2024-12-06 NOTE — ASSESSMENT & PLAN NOTE
Patient is currently in sinus rhythm. [id number] Score: 2. The patients heart rate in the last 24 hours is as follows:  Pulse  Min: 50  Max: 56     Antiarrhythmics       Anticoagulants  apixaban tablet 5 mg, 2 times daily, Oral    Plan  - Replete lytes with a goal of K>4, Mg >2  - Patient is anticoagulated, see medications listed above.  - Patient's afib is currently controlled

## 2024-12-06 NOTE — ED NOTES
James COTTO notified of pt HR 51. Verbal order to hold metoprolol and nifedipine to be given instead.

## 2024-12-06 NOTE — SUBJECTIVE & OBJECTIVE
"Past Medical History:   Diagnosis Date    Atrial fibrillation     CAD (coronary artery disease) 2006    MI in 2006    CHF (congestive heart failure) 2017    CKD (chronic kidney disease) stage 3, GFR 30-59 ml/min     Dr. Latif.  in transplanted kidney    CKD (chronic kidney disease) stage 5, GFR less than 15 ml/min 02/02/2024    COVID-19 02/07/2023    Diverticulosis     Hyperlipidemia     Hypertension     Keloid cicatrix     Metabolic bone disease     Other emphysema 10/01/2019    Pericarditis     S/P kidney transplant 1992    x2 (1992 & 2005),    Thrombocytopenia     Thyroid disease     Tobacco use 09/05/2014       Past Surgical History:   Procedure Laterality Date    AV FISTULA PLACEMENT Left 12/18/2023    Procedure: CREATION, AV FISTULA;  Surgeon: JUANI Melendez III, MD;  [facility];  Service: Vascular;  Laterality: Left;  LUE AVF creation vs AVG insertion    CARDIOVERSION  04/30/15    CHOLECYSTECTOMY      COLONOSCOPY  April 20, 2011    Diverticulosis, repeat recommended in 3 yrs., repeat colonoscopy 2014 revealed 2 polyps.  He should return in 5 years.    COLONOSCOPY N/A 3/13/2020    Procedure: COLONOSCOPY;  Surgeon: Chon Casper MD;  Location: [facility] (4TH FLR);  Service: Endoscopy;  Laterality: N/A;  ok to hold coumadin x5days-see telephone encounter 2/4/20-tb    COLONOSCOPY N/A 2/4/2021    Procedure: COLONOSCOPY;  Surgeon: Chon Casper MD;  Location: [facility] (4TH FLR);  Service: Endoscopy;  Laterality: N/A;  Eliquis - per Dr. GAVIN Blunt ok to hold x 2 days and "restarted asap"- ERW  last prep "poor", [id number] ordered/ Pt requests Dr. Casper  prep ins. mailed - COVID screening on 2/1/21 PCW- ERW    COLONOSCOPY N/A 3/3/2021    Procedure: COLONOSCOPY;  Surgeon: Chon Casper MD;  Location: [facility] (4TH FLR);  Service: Endoscopy;  Laterality: N/A;  Patient with his second poor bowel pre and has poor prep today.  If patient not intersted or can't get colonoscopy tomorrow will " need constipation bowel prep and will need to restart his Eliquis today.Thanks,Chon     per Dr Lopez to hold Eliquis 2 days prior      COVID     CORONARY ANGIOGRAPHY N/A 2/28/2024    Procedure: ANGIOGRAM, CORONARY ARTERY;  Surgeon: Tylor Lincoln MD;  Location: [facility] CATH LAB;  Service: Cardiology;  Laterality: N/A;    CORONARY ANGIOPLASTY WITH STENT PLACEMENT  9/13/2006    FISTULOGRAM N/A 2/19/2024    Procedure: Fistulogram;  Surgeon: JUANI Melendez III, MD;  Location: [facility] CATH LAB;  Service: Peripheral Vascular;  Laterality: N/A;    FISTULOGRAM, WITH PTA Left 6/3/2024    Procedure: FISTULOGRAM, WITH PTA;  Surgeon: JUANI Melendez III, MD;  Location: [facility] OR [facility] FLR;  Service: Vascular;  Laterality: Left;  mGy: 51.58  GyCm2: 6.8285  Fluoro time: 5.1 min  Contrast: 28 cc    IRRIGATION AND DEBRIDEMENT Right 8/30/2023    Procedure: IRRIGATION AND DEBRIDEMENT, RIGHT MIDDLE FINGER;  Surgeon: Martin Larsen MD;  Location: [facility] OR [facility] FLR;  Service: Orthopedics;  Laterality: Right;    KIDNEY TRANSPLANT  2005    LEFT HEART CATHETERIZATION N/A 2/26/2024    Procedure: Left heart cath;  Surgeon: Tylor Lincoln MD;  Location: [facility] CATH LAB;  Service: Cardiology;  Laterality: N/A;    PARATHYROIDECTOMY      PERCUTANEOUS TRANSLUMINAL ANGIOPLASTY OF ARTERIOVENOUS FISTULA Left 2/19/2024    Procedure: PTA, AV FISTULA;  Surgeon: JUANI Melendez III, MD;  Location: [facility] CATH LAB;  Service: Peripheral Vascular;  Laterality: Left;    STENT, DRUG ELUTING, SINGLE VESSEL, CORONARY N/A 2/28/2024    Procedure: Stent, Drug Eluting, Single Vessel, Coronary;  Surgeon: Tylor Lincoln MD;  Location: [facility] CATH LAB;  Service: Cardiology;  Laterality: N/A;    TREATMENT OF CARDIAC ARRHYTHMIA N/A 3/28/2022    Procedure: CARDIOVERSION;  Surgeon: Migue Blunt MD;  Location: [facility] EP LAB;  Service: Cardiology;  Laterality: N/A;  AF, DCC, MAC, GP, 3 PREP*RODRIGO deferred pt 100% compliant*    VENOPLASTY  6/3/2024    Procedure:  ANGIOPLASTY, VEIN;  Surgeon: JUANI Melendez III, MD;  Location: [facility] OR [address] [place] [address];  Service: Vascular;;  Left subclavian       Review of patient's allergies indicates:   Allergen Reactions    Ace inhibitors Swelling    Verapamil Other (See Comments)     Other reaction(s): Unknown    Povidone-iodine Itching       No current facility-administered medications on file prior to encounter.     Current Outpatient Medications on File Prior to Encounter   Medication Sig    acetaminophen (TYLENOL) 500 MG tablet Take 1 tablet (500 mg total) by mouth every 6 (six) hours as needed for Pain.    albuterol (VENTOLIN HFA) 90 mcg/actuation inhaler Inhale 2 puffs into the lungs every 6 (six) hours as needed for Wheezing or Shortness of Breath. Rescue    albuterol-ipratropium (DUO-NEB) 2.5 mg-0.5 mg/3 mL nebulizer solution Take 3 mLs by nebulization every 4 (four) hours as needed for Wheezing. Rescue    amiodarone (PACERONE) 200 MG Tab TAKE 1 TABLET BY MOUTH EVERY DAY    atorvastatin (LIPITOR) 80 MG tablet Take 1 tablet (80 mg total) by mouth once daily.    b complex vitamins (B COMPLEX-VITAMIN B12) tablet Take 1 tablet by mouth once daily.    calcium carbonate (CALCIUM 600 ORAL) Take 1 tablet by mouth 2 (two) times a day.    CHOLECALCIFEROL, VITAMIN D3, ORAL Take 2 tablets by mouth 2 (two) times daily.    clopidogreL (PLAVIX) 75 mg tablet Take 1 tablet (75 mg total) by mouth once daily.    doxazosin (CARDURA) 4 MG tablet Take 1 tablet (4 mg total) by mouth every 12 (twelve) hours.    ELIQUIS 5 mg Tab TAKE 1 TABLET BY MOUTH TWICE A DAY    famotidine (PEPCID) 20 MG tablet TAKE 1 TABLET (20 MG TOTAL) BY MOUTH ONCE DAILY. ON DIALYSIS DAYS, PLEASE TAKE THIS MEDICINE AFTER DIALYSIS.    fexofenadine HCl (ALLEGRA ORAL) Take 1 tablet by mouth daily as needed (allergies).    fluticasone furoate-vilanteroL (BREO) 100-25 mcg/dose diskus inhaler Inhale 1 puff into the lungs once daily. Controller. Use this inhaler every day.    fluticasone  propionate (FLONASE) 50 mcg/actuation nasal spray 1 spray (50 mcg total) by Each Nostril route daily as needed for Allergies.    gabapentin (NEURONTIN) 100 MG capsule Take 1 capsule (100 mg total) by mouth as needed (pain).    metoprolol succinate (TOPROL-XL) 25 MG 24 hr tablet Take 0.5 tablets (12.5 mg total) by mouth once daily.    multivitamin (THERAGRAN) per tablet Take 1 tablet by mouth Daily.    NIFEdipine (PROCARDIA-XL) 30 MG (OSM) 24 hr tablet Take 1 tablet (30 mg total) by mouth 2 (two) times a day.    nitroGLYCERIN (NITROSTAT) 0.4 MG SL tablet Place 1 tablet (0.4 mg total) under the tongue every 5 (five) minutes as needed for Chest pain.    omeprazole (PRILOSEC) 20 MG capsule Take 1 capsule (20 mg total) by mouth daily as needed (heartburn).    predniSONE (DELTASONE) 5 MG tablet Take 1 tablet (5 mg total) by mouth once daily.    pulse oximeter (PULSE OXIMETER) device by Apply Externally route 2 (two) times a day. Use twice daily at 8 AM and 3 PM and [facility] as directed.    tacrolimus (PROGRAF) 1 MG Cap Take 3 capsules (3 mg total) by mouth every 12 (twelve) hours for 60 days, THEN 2 capsules (2 mg total) every 12 (twelve) hours for 60 days, THEN 1 capsule (1 mg total) every 12 (twelve) hours for 60 days, THEN 1 capsule (1 mg total) once daily.    traMADoL (ULTRAM) 50 mg tablet Take 1 tablet (50 mg total) by mouth every 6 (six) hours as needed for Pain.     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     Patient is currently retired   Substance and Sexual Activity    Alcohol use: No    Drug use: No    Sexual activity: Yes     Partners: Female     Review of Systems   Constitutional:  Negative for activity change, appetite change, fatigue and fever.   Respiratory:  Positive for chest tightness and shortness of breath. Negative for apnea and stridor.    Cardiovascular:  Positive for chest pain. Negative for palpitations and leg swelling.   Gastrointestinal:  Negative for abdominal distention and abdominal pain.   Endocrine: Negative for polyphagia and polyuria.   Genitourinary:  Negative for dysuria.   Musculoskeletal:  Negative for arthralgias.   Neurological:  Negative for syncope.   Psychiatric/Behavioral:  Negative for agitation and behavioral problems.      Objective:     Vital Signs (Most Recent):  Temp: 97.9 °F (36.6 °C) (12/06/24 1500)  Pulse: (!) 53 (12/06/24 1600)  Resp: (!) 22 (12/06/24 1600)  BP: (!) 143/62 (12/06/24 1600)  SpO2: 100 % (12/06/24 1600) Vital Signs (24h Range):  Temp:  [97.5 °F (36.4 °C)-97.9 °F (36.6 °C)] 97.9 °F (36.6 °C)  Pulse:  [50-56] 53  Resp:  [17-22] 22  SpO2:  [100 %] 100 %  BP: (138-176)/(62-71) 143/62     Weight: 76.2 kg (168 lb)  Body mass index is 22.16 kg/m².     Physical Exam  Constitutional:       Appearance: Normal appearance.   HENT:      Head: Normocephalic and atraumatic.      Mouth/Throat:      Mouth: Mucous membranes are moist.      Pharynx: Oropharynx is clear.   Eyes:      Extraocular Movements: Extraocular movements intact.   Cardiovascular:      Rate and Rhythm: Regular rhythm. Bradycardia present.   Pulmonary:      Effort: Pulmonary effort is normal. No respiratory distress.      Breath sounds: No wheezing.   Abdominal:      General: There is no distension.   Musculoskeletal:         General: No swelling or tenderness.      Cervical back: Normal range of motion. No rigidity or  tenderness.      Right lower leg: No edema.      Left lower leg: No edema.   Skin:     Coloration: Skin is not jaundiced.   Neurological:      General: No focal deficit present.      Mental Status: He is alert and oriented to person, place, and time.   Psychiatric:         Mood and Affect: Mood normal.         Behavior: Behavior normal.                Significant Labs: All pertinent labs within the past 24 hours have been reviewed.    Significant Imaging: I have reviewed all pertinent imaging results/findings within the past 24 hours.

## 2024-12-06 NOTE — ASSESSMENT & PLAN NOTE
Patient with known CAD s/p stent placement, which is controlled Will continue Plavix and Statin and monitor for S/Sx of angina/ACS. Continue to monitor on telemetry.     EKG non ischemic   Elevated troponin 46 > 49 > 56 - could be related to CKD and iso holding plavix and   Trend troponin  C/w statin and Plavix  ED continued Eliquis  Cardiology consult

## 2024-12-06 NOTE — ASSESSMENT & PLAN NOTE
Nephrology consulted  Tacro level daily   Tacro 1 mg daily  C/w home prednisone  HD dependent patient - TTS - last session Wednesday as he was getting colonoscopy prep - HD per renal

## 2024-12-06 NOTE — HPI
69 y.o. male with CAD s/p stent placements (1 stent placed in 2006, 2 stents placed in 02/2024), CKD s/p renal transplant (in 1992) on hemodialysis (TTS), HTN, CHF presents to the ED w/ complaints of left sided pressure like CP that resolved with nitro. Cardiology consulted for reassurance on CP.      History obtained from patient, wife at bedside and ED notes. Patient states that at about 06:00 this morning he experienced a 10 minute episode of left-sided CP did not radiate as well as a brief episode of SOB and lightheadedness at that time; patient took 1 sublingual nitroglycerin which completely alleviated his pain has had no symptoms since this occurrence.    He has been holding his Eliquis for 2 days and Plavix for 5 days in anticipation of a colonoscopy today. He went for surveillance colonoscopy this morning as pain had resolved but was sent to be evaluated to ER and [id number] was cancelled. Colonoscopy was for colon polyps that were found in the past.  Patient denies HA, syncope, f/c, cough, hemoptysis, recent illnesses, flank pain, abdominal pain N/V/D, dysuria/hematuria, lower extremity edema/erythema.      Trop 46 > 49 > 56  EKG with sinus bradycardia without ST/T wave changes  Cxr normal  CP free on my interview.

## 2024-12-06 NOTE — HPI
Ibrahima Phelps is a 69 y.o. male a PMHx significant for CAD s/p stent placements (1 stent placed in 2006, 2 stents placed in 02/2024), CKD s/p renal transplant (in 1992) on hemodialysis (TTS), HTN, CHF presents to the ED w/ complaints of left sided pressure like CP that resolved with nitro.    History obtained from patient, wife at bedside and ED notes. Patient states that at about 06:00 this morning he experienced a 10 minute episode of left-sided CP did not radiate as well as a brief episode of SOB and lightheadedness at that time; patient took 1 sublingual nitroglycerin which completely alleviated his pain has had no symptoms since this occurrence.  He has been holding his Eliquis for 2 days and Plavix for 5 days in anticipation of a colonoscopy today. He went for surveillance colonoscopy this morning as pain had resolved but was sent to be evaluated to ER and [id number] was cancelled. Colonoscopy was for colon polyps that were found in the past.  Patient denies HA, syncope, f/c, cough, hemoptysis, recent illnesses, flank pain, abdominal pain N/V/D, dysuria/hematuria, lower extremity edema/erythema.     Trop 46 > 49 > 56  EKG with sinus bradycardia without ST/T wave changes  Cxr normal  HM to admit for obs.

## 2024-12-06 NOTE — ED PROVIDER NOTES
Encounter Date: 12/6/2024       History     Chief Complaint   Patient presents with    Chest Pain     Patient arrived from endoscopy following acute onset of 8/10 left anterior thoracic chest pain. Patient reports self-administering 1 SL NTG table with reported relief.      HPI    Ibrahima Phelps is a 69 y.o. male a PMHx significant for CAD s/p stent placements (1 stent placed in 2006, 2 stents placed in 02/2024), CKD s/p renal transplant (in 1992) on hemodialysis (Tuesday Thursday Saturday), HTN, CHF presents to the ED w/ complaints of CP.  Patient states that at about 06:00 this morning he experienced a 10 minute episode of left-sided CP did not radiate as well as a brief episode of SOB and lightheadedness at that time; patient took 1 sublingual nitroglycerin which completely alleviated his pain has had no symptoms since this occurrence.  Patient was referred here from GI clinical where he was scheduled to have a surveillance colonoscopy performed this morning due to colon polyps that were found in the past.  Patient denies HA, syncope, f/c, cough, hemoptysis, recent illnesses, flank pain, abdominal pain N/V/D, dysuria/hematuria, lower extremity edema/erythema. Of note, patient has been holding his home anticoagulants in antihypertensives due to his scheduled colonoscopy today.  He has been taking 81 mg of ASA daily.      Review of patient's allergies indicates:   Allergen Reactions    Ace inhibitors Swelling    Verapamil Other (See Comments)     Other reaction(s): Unknown    Povidone-iodine Itching     Past Medical History:   Diagnosis Date    Atrial fibrillation     CAD (coronary artery disease) 2006    MI in 2006    CHF (congestive heart failure) 2017    CKD (chronic kidney disease) stage 3, GFR 30-59 ml/min     Dr. Latif.  in transplanted kidney    CKD (chronic kidney disease) stage 5, GFR less than 15 ml/min 02/02/2024    COVID-19 02/07/2023    Diverticulosis     Hyperlipidemia     Hypertension     Keloid  "cicatrix     Metabolic bone disease     Other emphysema 10/01/2019    Pericarditis     S/P kidney transplant 1992    x2 (1992 & 2005),    Thrombocytopenia     Thyroid disease     Tobacco use 09/05/2014     Past Surgical History:   Procedure Laterality Date    AV FISTULA PLACEMENT Left 12/18/2023    Procedure: CREATION, AV FISTULA;  Surgeon: JUANI Melendez III, MD;  [facility];  Service: Vascular;  Laterality: Left;  LUE AVF creation vs AVG insertion    CARDIOVERSION  04/30/15    CHOLECYSTECTOMY      COLONOSCOPY  April 20, 2011    Diverticulosis, repeat recommended in 3 yrs., repeat colonoscopy 2014 revealed 2 polyps.  He should return in 5 years.    COLONOSCOPY N/A 3/13/2020    Procedure: COLONOSCOPY;  Surgeon: Chon Casper MD;  Location: [facility] (4TH FLR);  Service: Endoscopy;  Laterality: N/A;  ok to hold coumadin x5days-see telephone encounter 2/4/20-tb    COLONOSCOPY N/A 2/4/2021    Procedure: COLONOSCOPY;  Surgeon: Chon Casper MD;  [facility]  Service: Endoscopy;  Laterality: N/A;  Eliquis - per Dr. GAVIN Blunt ok to hold x 2 days and "restarted asap"- ERW  last prep "poor", [id number] ordered/ Pt requests Dr. Casper  prep ins. mailed - COVID screening on 2/1/21 PCW- ERW    COLONOSCOPY N/A 3/3/2021    Procedure: COLONOSCOPY;  Surgeon: Chon Casper MD;  [facility]  Service: Endoscopy;  Laterality: N/A;  Patient with his second poor bowel pre and has poor prep today.  If patient not intersted or can't get colonoscopy tomorrow will need constipation bowel prep and will need to restart his Eliquis today.Thanks,Chon     per Dr Blunt-ok to hold Eliquis 2 days prior      COVID     CORONARY ANGIOGRAPHY N/A 2/28/2024    Procedure: ANGIOGRAM, CORONARY ARTERY;  Surgeon: Tylor Lincoln MD;  Location: [facility] CATH LAB;  Service: Cardiology;  Laterality: N/A;    CORONARY ANGIOPLASTY WITH STENT PLACEMENT  9/13/2006    FISTULOGRAM N/A 2/19/2024    " Procedure: Fistulogram;  Surgeon: JUANI Melendez III, MD;  Location: [facility] CATH LAB;  Service: Peripheral Vascular;  Laterality: N/A;    FISTULOGRAM, WITH PTA Left 6/3/2024    Procedure: FISTULOGRAM, WITH PTA;  Surgeon: JUANI Melendez III, MD;  [facility];  Service: Vascular;  Laterality: Left;  mGy: 51.58  GyCm2: 6.8285  Fluoro time: 5.1 min  Contrast: 28 cc    IRRIGATION AND DEBRIDEMENT Right 8/30/2023    Procedure: IRRIGATION AND DEBRIDEMENT, RIGHT MIDDLE FINGER;  Surgeon: Martin Larsen MD;  Location: [facility] OR [address] [place] [address];  Service: Orthopedics;  Laterality: Right;    KIDNEY TRANSPLANT  2005    LEFT HEART CATHETERIZATION N/A 2/26/2024    Procedure: Left heart cath;  Surgeon: Tylor Lincoln MD;  Location: [facility] CATH LAB;  Service: Cardiology;  Laterality: N/A;    PARATHYROIDECTOMY      PERCUTANEOUS TRANSLUMINAL ANGIOPLASTY OF ARTERIOVENOUS FISTULA Left 2/19/2024    Procedure: PTA, AV FISTULA;  Surgeon: JUANI Melendez III, MD;  Location: [facility] CATH LAB;  Service: Peripheral Vascular;  Laterality: Left;    STENT, DRUG ELUTING, SINGLE VESSEL, CORONARY N/A 2/28/2024    Procedure: Stent, Drug Eluting, Single Vessel, Coronary;  Surgeon: Tylor Lincoln MD;  Location: [facility] CATH LAB;  Service: Cardiology;  Laterality: N/A;    TREATMENT OF CARDIAC ARRHYTHMIA N/A 3/28/2022    Procedure: CARDIOVERSION;  Surgeon: Migue Blunt MD;  Location: [facility] EP LAB;  Service: Cardiology;  Laterality: N/A;  AF, DCC, MAC, GP, 3 PREP*RODRIGO deferred pt 100% compliant*    VENOPLASTY  6/3/2024    Procedure: ANGIOPLASTY, VEIN;  Surgeon: JUANI Melendez III, MD;  [facility];  Service: Vascular;;  Left subclavian     Family History   Problem Relation Name Age of Onset    No Known Problems Sister      No Known Problems Brother      Thyroid disease Mother          s/p surgery    Heart disease Father          had pacemaker    No Known Problems Sister      Kidney failure Sister      Kidney disease  Sister      No Known Problems Sister      Kidney disease Brother      Kidney failure Brother          s/p transplant    Diabetes Mellitus Neg Hx      Stroke Neg Hx      Heart attack Neg Hx      Cancer Neg Hx      Celiac disease Neg Hx      Cirrhosis Neg Hx      Colon cancer Neg Hx      Esophageal cancer Neg Hx      Inflammatory bowel disease Neg Hx      Rectal cancer Neg Hx      Stomach cancer Neg Hx      Ulcerative colitis Neg Hx      Liver disease Neg Hx      Liver cancer Neg Hx      Crohn's disease Neg Hx      Melanoma Neg Hx       Social History     Tobacco Use    Smoking status: Every Day     Current packs/day: 0.00     Average packs/day: 0.5 packs/day for 40.0 years (20.0 ttl pk-yrs)     Types: Cigarettes     Start date: 1982     Last attempt to quit: 2022     Years since quittin.7    Smokeless tobacco: Never    Tobacco comments:     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     Patient is currently retired   Substance Use Topics    Alcohol use: No    Drug use: No     Review of Systems    Physical Exam     Initial Vitals [24 1021]   BP Pulse Resp Temp SpO2   (!) 151/65 (!) 54 17 97.5 °F (36.4 °C) 100 %      MAP       --         Physical Exam    Nursing note and vitals reviewed.  Constitutional: He appears well-developed and well-nourished. No distress.   HENT:   Head: Normocephalic and atraumatic.   Nose: Nose normal.   Eyes: Conjunctivae and EOM are normal.   Neck:   Normal range of motion.  Cardiovascular:  Normal rate, normal heart sounds and intact distal pulses.           Pulmonary/Chest: Breath sounds normal. No respiratory distress.   Abdominal: Abdomen is soft. Bowel sounds are normal. There is no abdominal tenderness.   Musculoskeletal:         General: No tenderness or edema. Normal range of motion.      Cervical back: Normal range of motion.     Neurological: He is alert and oriented to person, place, and time. He has normal strength. No  sensory deficit.   Skin: Skin is warm and dry. Capillary refill takes less than 2 seconds.   Psychiatric: He has a normal mood and affect. His behavior is normal.         ED Course   Procedures  Labs Reviewed   CBC W/ AUTO DIFFERENTIAL - Abnormal       Result Value    WBC 8.74      RBC 4.20 (*)     Hemoglobin 11.3 (*)     Hematocrit 35.6 (*)     MCV 85      MCH 26.9 (*)     MCHC 31.7 (*)     RDW 16.1 (*)     Platelets 199      MPV 11.6      Immature Granulocytes 1.0 (*)     Gran # (ANC) 5.7      Immature Grans (Abs) 0.09 (*)     Lymph # 1.7      Mono # 0.8      Eos # 0.5      Baso # 0.04      nRBC 0      Gran % 64.6      Lymph % 19.0      Mono % 8.7      Eosinophil % 6.2      Basophil % 0.5      Differential Method Automated     COMPREHENSIVE METABOLIC PANEL - Abnormal    Sodium 135 (*)     Potassium 3.8      Chloride 96      CO2 19 (*)     Glucose 75      BUN 36 (*)     Creatinine 7.8 (*)     Calcium 8.5 (*)     Total Protein 7.1      Albumin 2.9 (*)     Total Bilirubin 0.4      Alkaline Phosphatase 58      AST 33      ALT 38      eGFR 6.9 (*)     Anion Gap 20 (*)    TROPONIN I HIGH SENSITIVITY - Abnormal    Troponin I High Sensitivity 49 (*)    TROPONIN I HIGH SENSITIVITY - Abnormal    Troponin I High Sensitivity 46 (*)    B-TYPE NATRIURETIC PEPTIDE - Abnormal     (*)    TROPONIN I HIGH SENSITIVITY - Abnormal    Troponin I High Sensitivity 56 (*)         ECG Results              EKG 12-lead (Final result)        Collection Time Result Time QRS Duration OHS QTC Calculation    12/06/24 10:28:32 12/06/24 13:08:58 114 466                     Final result by Interface, Lab In [facility] (12/06/24 13:09:03)                   Narrative:    Test Reason : R07.9,    Vent. Rate :  54 BPM     Atrial Rate :  54 BPM     P-R Int : 194 ms          QRS Dur : 114 ms      QT Int : 492 ms       P-R-T Axes :  80  61  98 degrees    QTcB Int : 466 ms    Sinus bradycardia  LVH with repolarization abnormality  Abnormal ECG  When  compared with ECG of 12-Sep-2024 07:52,  No significant change was found  Confirmed by Luke Muñoz ([id number]) on 12/6/2024 1:08:55 PM    Referred By: AAAREFERRAL SELF           Confirmed By: Luke Muñoz                                  Imaging Results              X-Ray Chest AP Portable (Final result)  Result time 12/06/24 11:42:17      Final result by Bryon Nguyen MD (12/06/24 11:42:17)                   Impression:      See above      Electronically signed by: Bryon Nguyen MD  Date:    12/06/2024  Time:    11:42               Narrative:    EXAMINATION:  XR CHEST AP PORTABLE    CLINICAL HISTORY:  Chest Pain;    TECHNIQUE:  Single frontal view of the chest was performed.    COMPARISON:  No 04/09/2024 ne    FINDINGS:  Heart size normal.  The lungs are clear.  No pleural effusion                                       Medications   clopidogreL tablet 75 mg (75 mg Oral Given 12/6/24 1119)   apixaban tablet 5 mg (5 mg Oral Given 12/6/24 1119)   amiodarone tablet 200 mg (200 mg Oral Given 12/6/24 1119)   NIFEdipine 24 hr tablet 30 mg (30 mg Oral Given 12/6/24 1124)     Medical Decision Making  Ibrahima Phelps is a 69 y.o. male who presents to the emergency department for a 10 min episode of CP, SOB, and brief lightheadedness at about 06:00, which is now fully alleviated after NGT. On initial evaluation, patient is slightly hypertensive and bradycardic (bradycardia consistent w/ baseline per review of previous EKGs).  EKG shows sinus bradycardia w/o evidence of STEMI.  On exam, patient is resting comfortably w/o CP or other acute findings.  At this time, patient will be given home antihypertensives due to /71 as well as his home anticoagulants.  On re-evaluation, patient remains vitals stable and has no significant change in clinical status.  At time of handoff, patient is pending delta troponin.  Despite pending troponin, he will be admitted for further observation given his high-risk history and HEART score of 6.      Pertinent Labs:  CBC w/ mild anemia, but consistent w/previous labs.  No leukocytosis reducing concern for any acute infectious process.  Initial troponin elevated to 49 concerning for ACS/angina.  Delta troponin pending at time of handoff.  CMP w/evidence of impaired renal function, but relatively consistent w/ his baseline in his w/o any gross metabolic derangements.  BNP mildly elevated, but I have no significant concern for volume overload as this is much lower than previous labs and there was no evidence of volume overload on exam.    Pertinent Imaging: CXR unremarkable reducing concern for acute intrathoracic process including pulmonary edema, pneumonia, effusion.     Ddx including, but not limited to: ACS, pulmonary embolism, aortic dissection, aortic aneurysm, heart failure exacerbation, pulmonary edema, pneumonia, tension pneumothorax, cardiac tamponade, pleural effusion, Boerhaave's syndrome, esophagitis, GERD, gastritis, pericarditis, endocarditis, myocarditis, cardiomyopathy, pneumonitis, pleuritis, malignancy, costochondritis     Most likely Dx:      Disposition:   Patient will be admitted to . Discussed reason for admission and plan with patient and/or caregiver who expressed understanding and agreement.    Please see HPI, physical exam, ED course for additional details.    Amount and/or Complexity of Data Reviewed  Labs: ordered.  Radiology: ordered.    Risk  Prescription drug management.                                     Clinical Impression:  Final diagnoses:  [R07.9] Chest pain  [R07.9] Chest pain, unspecified type (Primary)                 Scott Ramirez MD  Resident  12/06/24 [id number]

## 2024-12-07 VITALS
DIASTOLIC BLOOD PRESSURE: 66 MMHG | SYSTOLIC BLOOD PRESSURE: 143 MMHG | HEART RATE: 64 BPM | HEIGHT: 73 IN | BODY MASS INDEX: 22.26 KG/M2 | OXYGEN SATURATION: 99 % | WEIGHT: 168 LBS | RESPIRATION RATE: 17 BRPM | TEMPERATURE: 98 F

## 2024-12-07 LAB
ALBUMIN SERPL BCP-MCNC: 2.8 G/DL (ref 3.5–5.2)
ALP SERPL-CCNC: 71 U/L (ref 40–150)
ALT SERPL W/O P-5'-P-CCNC: 29 U/L (ref 10–44)
ANION GAP SERPL CALC-SCNC: 19 MMOL/L (ref 8–16)
ASCENDING AORTA: 2.78 CM
AST SERPL-CCNC: 22 U/L (ref 10–40)
AV AREA BY CONTINUOUS VTI: 1.7 CM2
AV INDEX (PROSTH): 0.48
AV LVOT MEAN GRADIENT: 3 MMHG
AV LVOT PEAK GRADIENT: 5 MMHG
AV MEAN GRADIENT: 18.7 MMHG
AV PEAK GRADIENT: 27 MMHG
AV REGURGITATION PRESSURE HALF TIME: 713.11 MS
AV VALVE AREA BY VELOCITY RATIO: 1.5 CM²
AV VALVE AREA: 1.6 CM2
AV VELOCITY RATIO: 0.42
BASOPHILS # BLD AUTO: 0.04 K/UL (ref 0–0.2)
BASOPHILS NFR BLD: 0.6 % (ref 0–1.9)
BILIRUB SERPL-MCNC: 0.4 MG/DL (ref 0.1–1)
BSA FOR ECHO PROCEDURE: 1.98 M2
BUN SERPL-MCNC: 42 MG/DL (ref 8–23)
CALCIUM SERPL-MCNC: 8.3 MG/DL (ref 8.7–10.5)
CHLORIDE SERPL-SCNC: 96 MMOL/L (ref 95–110)
CO2 SERPL-SCNC: 22 MMOL/L (ref 23–29)
CREAT SERPL-MCNC: 9 MG/DL (ref 0.5–1.4)
CV ECHO LV RWT: 0.26 CM
DIFFERENTIAL METHOD BLD: ABNORMAL
DOP CALC AO PEAK VEL: 2.6 M/S
DOP CALC AO VTI: 55.3 CM
DOP CALC LVOT AREA: 3.5 CM2
DOP CALC LVOT DIAMETER: 2.1 CM
DOP CALC LVOT PEAK VEL: 1.1 M/S
DOP CALC LVOT STROKE VOLUME: 91 CM3
DOP CALCLVOT PEAK VEL VTI: 26.3 CM
E WAVE DECELERATION TIME: 226.52 MS
E/A RATIO: 0.88
E/E' RATIO: 8.35 M/S
ECHO EF ESTIMATED: 45 %
ECHO LV POSTERIOR WALL: 0.7 CM (ref 0.6–1.1)
EOSINOPHIL # BLD AUTO: 0.5 K/UL (ref 0–0.5)
EOSINOPHIL NFR BLD: 8 % (ref 0–8)
ERYTHROCYTE [DISTWIDTH] IN BLOOD BY AUTOMATED COUNT: 15.9 % (ref 11.5–14.5)
EST. GFR  (NO RACE VARIABLE): 5.8 ML/MIN/1.73 M^2
FRACTIONAL SHORTENING: 32.1 % (ref 28–44)
GLUCOSE SERPL-MCNC: 88 MG/DL (ref 70–110)
HCT VFR BLD AUTO: 37.2 % (ref 40–54)
HGB BLD-MCNC: 12.1 G/DL (ref 14–18)
IMM GRANULOCYTES # BLD AUTO: 0.08 K/UL (ref 0–0.04)
IMM GRANULOCYTES NFR BLD AUTO: 1.2 % (ref 0–0.5)
INTERVENTRICULAR SEPTUM: 0.9 CM (ref 0.6–1.1)
IVRT: 144.62 MS
LA MAJOR: 6.78 CM
LA MINOR: 6.83 CM
LA WIDTH: 4.54 CM
LEFT ATRIUM SIZE: 3.7 CM
LEFT ATRIUM VOLUME INDEX MOD: 60 ML/M2
LEFT ATRIUM VOLUME INDEX: 48.6 ML/M2
LEFT ATRIUM VOLUME MOD: 120 ML
LEFT ATRIUM VOLUME: 97.16 CM3
LEFT INTERNAL DIMENSION IN SYSTOLE: 3.6 CM (ref 2.1–4)
LEFT VENTRICLE DIASTOLIC VOLUME INDEX: 50.49 ML/M2
LEFT VENTRICLE DIASTOLIC VOLUME: 100.98 ML
LEFT VENTRICLE MASS INDEX: 75 G/M2
LEFT VENTRICLE SYSTOLIC VOLUME INDEX: 27.9 ML/M2
LEFT VENTRICLE SYSTOLIC VOLUME: 55.73 ML
LEFT VENTRICULAR INTERNAL DIMENSION IN DIASTOLE: 5.3 CM (ref 3.5–6)
LEFT VENTRICULAR MASS: 150.1 G
LV LATERAL E/E' RATIO: 6.45
LV SEPTAL E/E' RATIO: 11.83
LYMPHOCYTES # BLD AUTO: 1.6 K/UL (ref 1–4.8)
LYMPHOCYTES NFR BLD: 23.4 % (ref 18–48)
MAGNESIUM SERPL-MCNC: 2.3 MG/DL (ref 1.6–2.6)
MCH RBC QN AUTO: 26.9 PG (ref 27–31)
MCHC RBC AUTO-ENTMCNC: 32.5 G/DL (ref 32–36)
MCV RBC AUTO: 83 FL (ref 82–98)
MONOCYTES # BLD AUTO: 0.6 K/UL (ref 0.3–1)
MONOCYTES NFR BLD: 8.7 % (ref 4–15)
MV PEAK A VEL: 0.81 M/S
MV PEAK E VEL: 0.71 M/S
NEUTROPHILS # BLD AUTO: 3.9 K/UL (ref 1.8–7.7)
NEUTROPHILS NFR BLD: 58.1 % (ref 38–73)
NRBC BLD-RTO: 0 /100 WBC
OHS CV RV/LV RATIO: 0.66 CM
PHOSPHATE SERPL-MCNC: 6.1 MG/DL (ref 2.7–4.5)
PISA TR MAX VEL: 2.94 M/S
PLATELET # BLD AUTO: 213 K/UL (ref 150–450)
PMV BLD AUTO: 11.2 FL (ref 9.2–12.9)
POTASSIUM SERPL-SCNC: 3.7 MMOL/L (ref 3.5–5.1)
PROT SERPL-MCNC: 6.7 G/DL (ref 6–8.4)
RA MAJOR: 5.84 CM
RA PRESSURE ESTIMATED: 3 MMHG
RA WIDTH: 4.16 CM
RBC # BLD AUTO: 4.49 M/UL (ref 4.6–6.2)
RIGHT VENTRICLE DIASTOLIC BASEL DIMENSION: 3.5 CM
RV TB RVSP: 6 MMHG
RV TISSUE DOPPLER FREE WALL SYSTOLIC VELOCITY 1 (APICAL 4 CHAMBER VIEW): 15.42 CM/S
SINUS: 3.45 CM
SODIUM SERPL-SCNC: 137 MMOL/L (ref 136–145)
STJ: 2.72 CM
TACROLIMUS BLD-MCNC: 2.3 NG/ML (ref 5–15)
TACROLIMUS BLD-MCNC: <2 NG/ML (ref 5–15)
TDI LATERAL: 0.11 M/S
TDI SEPTAL: 0.06 M/S
TDI: 0.09 M/S
TR MAX PG: 35 MMHG
TRICUSPID ANNULAR PLANE SYSTOLIC EXCURSION: 2.22 CM
TROPONIN I SERPL DL<=0.01 NG/ML-MCNC: 57 NG/L (ref 0–35)
TROPONIN I SERPL DL<=0.01 NG/ML-MCNC: 64 NG/L (ref 0–35)
TV PEAK GRADIENT: 35 MMHG
TV REST PULMONARY ARTERY PRESSURE: 38 MMHG
WBC # BLD AUTO: 6.75 K/UL (ref 3.9–12.7)
Z-SCORE OF LEFT VENTRICULAR DIMENSION IN END DIASTOLE: -0.95
Z-SCORE OF LEFT VENTRICULAR DIMENSION IN END SYSTOLE: 0.05

## 2024-12-07 PROCEDURE — 25000003 PHARM REV CODE 250: Mod: HCNC

## 2024-12-07 PROCEDURE — 99214 OFFICE O/P EST MOD 30 MIN: CPT | Mod: HCNC,,,

## 2024-12-07 PROCEDURE — 63600175 PHARM REV CODE 636 W HCPCS: Mod: HCNC | Performed by: STUDENT IN AN ORGANIZED HEALTH CARE EDUCATION/TRAINING PROGRAM

## 2024-12-07 PROCEDURE — 84484 ASSAY OF TROPONIN QUANT: CPT | Mod: HCNC | Performed by: STUDENT IN AN ORGANIZED HEALTH CARE EDUCATION/TRAINING PROGRAM

## 2024-12-07 PROCEDURE — 25000003 PHARM REV CODE 250: Mod: HCNC | Performed by: STUDENT IN AN ORGANIZED HEALTH CARE EDUCATION/TRAINING PROGRAM

## 2024-12-07 PROCEDURE — 36415 COLL VENOUS BLD VENIPUNCTURE: CPT | Mod: HCNC | Performed by: STUDENT IN AN ORGANIZED HEALTH CARE EDUCATION/TRAINING PROGRAM

## 2024-12-07 PROCEDURE — 84100 ASSAY OF PHOSPHORUS: CPT | Mod: HCNC | Performed by: STUDENT IN AN ORGANIZED HEALTH CARE EDUCATION/TRAINING PROGRAM

## 2024-12-07 PROCEDURE — G0257 UNSCHED DIALYSIS ESRD PT HOS: HCPCS | Mod: HCNC

## 2024-12-07 PROCEDURE — 80053 COMPREHEN METABOLIC PANEL: CPT | Mod: HCNC | Performed by: STUDENT IN AN ORGANIZED HEALTH CARE EDUCATION/TRAINING PROGRAM

## 2024-12-07 PROCEDURE — 80197 ASSAY OF TACROLIMUS: CPT | Mod: HCNC | Performed by: STUDENT IN AN ORGANIZED HEALTH CARE EDUCATION/TRAINING PROGRAM

## 2024-12-07 PROCEDURE — G0378 HOSPITAL OBSERVATION PER HR: HCPCS | Mod: HCNC

## 2024-12-07 PROCEDURE — 85025 COMPLETE CBC W/AUTO DIFF WBC: CPT | Mod: HCNC | Performed by: STUDENT IN AN ORGANIZED HEALTH CARE EDUCATION/TRAINING PROGRAM

## 2024-12-07 PROCEDURE — 99214 OFFICE O/P EST MOD 30 MIN: CPT | Mod: HCNC,25,GC, | Performed by: INTERNAL MEDICINE

## 2024-12-07 PROCEDURE — 83735 ASSAY OF MAGNESIUM: CPT | Mod: HCNC | Performed by: STUDENT IN AN ORGANIZED HEALTH CARE EDUCATION/TRAINING PROGRAM

## 2024-12-07 RX ORDER — ISOSORBIDE MONONITRATE 30 MG/1
30 TABLET, EXTENDED RELEASE ORAL DAILY
Qty: 30 TABLET | Refills: 11 | Status: CANCELLED | OUTPATIENT
Start: 2024-12-08 | End: 2025-12-08

## 2024-12-07 RX ORDER — SODIUM CHLORIDE 9 MG/ML
INJECTION, SOLUTION INTRAVENOUS ONCE
Status: COMPLETED | OUTPATIENT
Start: 2024-12-07 | End: 2024-12-07

## 2024-12-07 RX ORDER — ISOSORBIDE MONONITRATE 30 MG/1
30 TABLET, EXTENDED RELEASE ORAL DAILY
Status: DISCONTINUED | OUTPATIENT
Start: 2024-12-07 | End: 2024-12-07

## 2024-12-07 RX ORDER — POTASSIUM CHLORIDE 20 MEQ/1
20 TABLET, EXTENDED RELEASE ORAL ONCE
Status: COMPLETED | OUTPATIENT
Start: 2024-12-07 | End: 2024-12-07

## 2024-12-07 RX ADMIN — PREDNISONE 5 MG: 5 TABLET ORAL at 09:12

## 2024-12-07 RX ADMIN — SODIUM CHLORIDE: 9 INJECTION, SOLUTION INTRAVENOUS at 02:12

## 2024-12-07 RX ADMIN — TACROLIMUS 1 MG: 1 CAPSULE ORAL at 09:12

## 2024-12-07 RX ADMIN — POTASSIUM CHLORIDE 20 MEQ: 1500 TABLET, EXTENDED RELEASE ORAL at 09:12

## 2024-12-07 RX ADMIN — APIXABAN 5 MG: 5 TABLET, FILM COATED ORAL at 09:12

## 2024-12-07 RX ADMIN — CLOPIDOGREL BISULFATE 75 MG: 75 TABLET ORAL at 09:12

## 2024-12-07 RX ADMIN — ATORVASTATIN CALCIUM 80 MG: 40 TABLET, FILM COATED ORAL at 09:12

## 2024-12-07 RX ADMIN — ISOSORBIDE MONONITRATE 30 MG: 30 TABLET, EXTENDED RELEASE ORAL at 09:12

## 2024-12-07 RX ADMIN — PANTOPRAZOLE SODIUM 40 MG: 40 TABLET, DELAYED RELEASE ORAL at 09:12

## 2024-12-07 NOTE — HOSPITAL COURSE
69 y.o. male with CAD s/p stent placements (1 stent placed in 2006, 2 stents placed in 02/2024), CKD s/p renal transplant (in 1992) on hemodialysis (TTS), HTN, CHF presents to the ED w/ complaints of left sided pressure like CP that resolved with nitro. EKG (sinus bradycardia without ST/T wave changes) non ischemic without CP recurrence on admission. CXR normal. Cardiology consulted for elevated troponin and recommended to restart AC, Plavix and Statin . Cardiology initially recommended to start patient on imdur 30 mg daily but later recommended to not prescribe on discharge and to discuss with outpatient cardiology and nephrology teams as BP has been low at home per patient and family. Bedside TTE at baseline no WMA, formal echo with EF 60-65%. Patient to benefit from cardiology follow up as OP.           -TTE 12/7  with EF 60 - 65%. Diastolic function cannot be reliably determined in the presence of mitral annular calcification. LA severely dilated, RA moderately dilated. mild pHTn with PASP 38 mmHg  - Follow up with outpatient cardiology team  - Follow up with outpatient nephrology team for hypotension with hemodialysis

## 2024-12-07 NOTE — PROGRESS NOTES
[facility] - Internal Medicine Telemetry  Cardiology  Progress Note    Patient Name: Ibrahima Phelps  MRN: [id number]  Admission Date: 12/6/2024  Hospital Length of Stay: 0 days  Code Status: Full Code   Attending Physician: Byron Rosenberg MD   Primary Care Physician: Anatoliy Colindres MD  Expected Discharge Date:   Principal Problem:CAD (coronary artery disease)    Subjective:     Hospital Course:   No notes on file    Interval History: patient is well rested and not in distress    ROS  Objective:     Vital Signs (Most Recent):  Temp: 97.5 °F (36.4 °C) (12/07/24 0521)  Pulse: 61 (12/07/24 0756)  Resp: 17 (12/07/24 0756)  BP: 135/64 (12/07/24 0756)  SpO2: 99 % (12/07/24 0756) Vital Signs (24h Range):  Temp:  [97.5 °F (36.4 °C)-98.3 °F (36.8 °C)] 97.5 °F (36.4 °C)  Pulse:  [48-61] 61  Resp:  [15-22] 17  SpO2:  [97 %-100 %] 99 %  BP: (114-176)/(58-71) 135/64     Weight: 80.5 kg (177 lb 7.5 oz)  Body mass index is 23.41 kg/m².     SpO2: 99 %         Intake/Output Summary (Last 24 hours) at 12/7/2024 1014  Last data filed at 12/7/2024 0932  Gross per 24 hour   Intake 480 ml   Output --   Net 480 ml       Lines/Drains/Airways       Peripheral Intravenous Line  Duration                  Hemodialysis AV Fistula Left upper arm -- days         Hemodialysis AV Fistula 02/19/24 0924 Left upper arm 292 days         Peripheral IV - Single Lumen 12/06/24 1000 20 G Right Forearm 1 day                       Physical Exam  Constitutional:       General: He is not in acute distress.     Appearance: Normal appearance. He is not ill-appearing, toxic-appearing or diaphoretic.   HENT:      Head: Normocephalic and atraumatic.   Eyes:      Pupils: Pupils are equal, round, and reactive to light.   Cardiovascular:      Rate and Rhythm: Normal rate and regular rhythm.      Pulses: Normal pulses.      Heart sounds: Normal heart sounds.   Pulmonary:      Effort: Pulmonary effort is normal. No respiratory distress.      Breath sounds: Normal breath  sounds. No wheezing or rales.   Abdominal:      General: Bowel sounds are normal.      Palpations: Abdomen is soft.   Musculoskeletal:      Right lower leg: No edema.      Left lower leg: No edema.   Skin:     Capillary Refill: Capillary refill takes less than 2 seconds.   Neurological:      General: No focal deficit present.      Mental Status: He is alert and oriented to person, place, and time. Mental status is at baseline.   Psychiatric:         Mood and Affect: Mood normal.         Behavior: Behavior normal.         Thought Content: Thought content normal.         Judgment: Judgment normal.            Significant Labs: All pertinent lab results from the last 24 hours have been reviewed.    Significant Imaging: All pertinent imaging results from the last 24 hours have been reviewed.  Assessment and Plan:         * Chest pain in patient with CAD (coronary artery disease)  69 y.o. male with CAD s/p stent placements (1 stent placed in 2006, 2 stents placed in 02/2024), CKD s/p renal transplant (in 1992) on hemodialysis (TTS), HTN, CHF presents to the ED w/ complaints of left sided pressure like CP that resolved with nitro. Cardiology consulted for CP.   PCI 2/2024 to RCA with good result. LHC 2/2024 with 3vCAD: prox/mid LAD 60%, OM1 70%, OM2 70%, RCA 95->0% post PCI.  CTS had declined patient for CABG and patient underwent PCI to the RCA on 02/28/2024. Mid LAD 70% + OM1/OM2 70% obstruction remained medically managed as thought was that culprit vessel (RCA) was addressed.   Patient was recent off eliquis and plavix for planned screening colonoscopy 12/6. Sent to the ED due to chest pain and procedure was cancelled.      Trop 46 > 49 > 56> 64> 57 ( less than 20 % increase)   EKG with sinus bradycardia without ST/T wave changes  Cxr normal  CP free on my interview.   HEART score 6   Bedside TTE overnight at baseline no WMA     PLAN:  -- can be discharged and follow up with cardiology  -- Resumed continue AC, Plavix  and Statin and monitor for S/Sx of angina/ACS. Continue to monitor on telemetry.   -- formal TTE ordered, can be performed as outpatient  -- Start and continue IMDUR for BP and anti-anginal         VTE Risk Mitigation (From admission, onward)           Ordered     apixaban tablet 5 mg  2 times daily         12/06/24 0498                    Julia Pradhan MD  Cardiology  [facility] - Internal Medicine Telemetry

## 2024-12-07 NOTE — PLAN OF CARE
[facility] - Emergency Dept  Discharge Assessment    Primary Care Provider: Anatoliy Colindres MD     Discharge Assessment (most recent)       BRIEF DISCHARGE ASSESSMENT - 12/06/24 1938          Discharge Planning    Assessment Type Discharge Planning Assessment (P)      Resource/Environmental Concerns none (P)      Support Systems Spouse/significant other;Friends/neighbors (P)      Equipment Currently Used at Home crutches (P)      Current Living Arrangements home (P)      Patient/Family Anticipates Transition to home (P)      Patient/Family Anticipated Services at Transition none (P)      DME Needed Upon Discharge  none (P)      Discharge Plan A Home with family (P)      Discharge Plan B Home with family (P)         Physical Activity    On average, how many days per week do you engage in moderate to strenuous exercise (like a brisk walk)? 0 days (P)      On average, how many minutes do you engage in exercise at this level? 0 min (P)         Financial Resource Strain    How hard is it for you to pay for the very basics like food, housing, medical care, and heating? Not hard at all (P)         Housing Stability    In the last 12 months, was there a time when you were not able to pay the mortgage or rent on time? No (P)      At any time in the past 12 months, were you homeless or living in a shelter (including now)? No (P)         Transportation Needs    Has the lack of transportation kept you from medical appointments, meetings, work or from getting things needed for daily living? No (P)         Food Insecurity    Within the past 12 months, you worried that your food would run out before you got the money to buy more. Never true (P)      Within the past 12 months, the food you bought just didn't last and you didn't have money to get more. Never true (P)         Stress    Do you feel stress - tense, restless, nervous, or anxious, or unable to sleep at night because your mind is troubled all the time - these days? To  some extent (P)         Social Isolation    How often do you feel lonely or isolated from those around you?  Never (P)         Alcohol Use    Q1: How often do you have a drink containing alcohol? Never (P)      Q2: How many drinks containing alcohol do you have on a typical day when you are drinking? Patient does not drink (P)      Q3: How often do you have six or more drinks on one occasion? Never (P)         Utilities    In the past 12 months has the electric, gas, oil, or water company threatened to shut off services in your home? No (P)         Health Literacy    How often do you need to have someone help you when you read instructions, pamphlets, or other written material from your doctor or pharmacy? Never (P)                    Sw met pt at bedside. Pt stated he has a dialysis chair at [facility] and Sat. Pt ambulates without assistance and has a large family support system. Pt has no acute case management needs at this time.        Kim Miller LMSW  Case Management  Emergency Department  [phone]

## 2024-12-07 NOTE — CONSULTS
[facility] - Internal Medicine Telemetry  Nephrology  Consult Note    Patient Name: Ibrahima Phelps  MRN: [id number]  Admission Date: 12/6/2024  Hospital Length of Stay: 0 days  Attending Provider: Byron Rosenberg MD   Primary Care Physician: Anatoliy Colindres MD  Principal Problem:CAD (coronary artery disease)    Inpatient consult to Nephrology  Consult performed by: Wendy Ibanez PA-C  Consult ordered by: Byron Rosenberg MD  Reason for consult: ESRD        Subjective:     HPI: Ibrahima Phelps is a 69 y.o. male a PMHx significant for CAD s/p stent placements (1 stent placed in 2006, 2 stents placed in 02/2024), CKD s/p renal transplant (in 1992) on hemodialysis (TTS), HTN, CHF presents to the ED w/ complaints of left sided pressure like CP that resolved with nitro.     History obtained from EMR review: Patient states that at about 06:00 this morning he experienced a 10 minute episode of left-sided CP did not radiate as well as a brief episode of SOB and lightheadedness at that time; patient took 1 sublingual nitroglycerin which completely alleviated his pain has had no symptoms since this occurrence.  He has been holding his Eliquis for 2 days and Plavix for 5 days in anticipation of a colonoscopy today. He went for surveillance colonoscopy this morning as pain had resolved but was sent to be evaluated to ER and [id number] was cancelled. Colonoscopy was for colon polyps that were found in the past.  Patient denies HA, syncope, f/c, cough, hemoptysis, recent illnesses, flank pain, abdominal pain N/V/D, dysuria/hematuria, lower extremity edema/erythema.     At admission, troponin trending up 46 > 49 > 56. EKG with sinus bradycardia without ST/T wave changes. Admitted to  for observation, cardiology consulted and recommend resuming anticoagulation and obtaining Echo. Nephrology consulted for management of ESRD.     Past Medical History:   Diagnosis Date    Atrial fibrillation     CAD (coronary artery disease) 2006    MI in  "2006    CHF (congestive heart failure) 2017    CKD (chronic kidney disease) stage 3, GFR 30-59 ml/min     Dr. Latif.  in transplanted kidney    CKD (chronic kidney disease) stage 5, GFR less than 15 ml/min 02/02/2024    COVID-19 02/07/2023    Diverticulosis     Hyperlipidemia     Hypertension     Keloid cicatrix     Metabolic bone disease     Other emphysema 10/01/2019    Pericarditis     S/P kidney transplant 1992    x2 (1992 & 2005),    Thrombocytopenia     Thyroid disease     Tobacco use 09/05/2014       Past Surgical History:   Procedure Laterality Date    AV FISTULA PLACEMENT Left 12/18/2023    Procedure: CREATION, AV FISTULA;  Surgeon: JUANI Melendez III, MD;  [facility];  Service: Vascular;  Laterality: Left;  LUE AVF creation vs AVG insertion    CARDIOVERSION  04/30/15    CHOLECYSTECTOMY      COLONOSCOPY  April 20, 2011    Diverticulosis, repeat recommended in 3 yrs., repeat colonoscopy 2014 revealed 2 polyps.  He should return in 5 years.    COLONOSCOPY N/A 3/13/2020    Procedure: COLONOSCOPY;  Surgeon: Chon Casper MD;  Location: [facility] (4TH FLR);  Service: Endoscopy;  Laterality: N/A;  ok to hold coumadin x5days-see telephone encounter 2/4/20-tb    COLONOSCOPY N/A 2/4/2021    Procedure: COLONOSCOPY;  Surgeon: Chon Casper MD;  Location: [facility] (4TH FLR);  Service: Endoscopy;  Laterality: N/A;  Eliquis - per Dr. GAVIN Blunt ok to hold x 2 days and "restarted asap"- ERW  last prep "poor", [id number] ordered/ Pt requests Dr. Casper  prep ins. mailed - COVID screening on 2/1/21 PCW- ERW    COLONOSCOPY N/A 3/3/2021    Procedure: COLONOSCOPY;  Surgeon: Chon Casper MD;  Location: [facility] (4TH FLR);  Service: Endoscopy;  Laterality: N/A;  Patient with his second poor bowel pre and has poor prep today.  If patient not intersted or can't get colonoscopy tomorrow will need constipation bowel prep and will need to restart his Eliquis today.Thanks,Chon     per Dr Blunt-ok to " hold Eliquis 2 days prior      COVID     CORONARY ANGIOGRAPHY N/A 2/28/2024    Procedure: ANGIOGRAM, CORONARY ARTERY;  Surgeon: Tylor Lincoln MD;  Location: [facility] CATH LAB;  Service: Cardiology;  Laterality: N/A;    CORONARY ANGIOPLASTY WITH STENT PLACEMENT  9/13/2006    FISTULOGRAM N/A 2/19/2024    Procedure: Fistulogram;  Surgeon: JUANI Melendez III, MD;  Location: [facility] CATH LAB;  Service: Peripheral Vascular;  Laterality: N/A;    FISTULOGRAM, WITH PTA Left 6/3/2024    Procedure: FISTULOGRAM, WITH PTA;  Surgeon: JUANI Melendez III, MD;  Location: [facility] OR [facility] FLR;  Service: Vascular;  Laterality: Left;  mGy: 51.58  GyCm2: 6.8285  Fluoro time: 5.1 min  Contrast: 28 cc    IRRIGATION AND DEBRIDEMENT Right 8/30/2023    Procedure: IRRIGATION AND DEBRIDEMENT, RIGHT MIDDLE FINGER;  Surgeon: Martin Larsen MD;  [facility];  Service: Orthopedics;  Laterality: Right;    KIDNEY TRANSPLANT  2005    LEFT HEART CATHETERIZATION N/A 2/26/2024    Procedure: Left heart cath;  Surgeon: Tylor Lincoln MD;  Location: [facility] CATH LAB;  Service: Cardiology;  Laterality: N/A;    PARATHYROIDECTOMY      PERCUTANEOUS TRANSLUMINAL ANGIOPLASTY OF ARTERIOVENOUS FISTULA Left 2/19/2024    Procedure: PTA, AV FISTULA;  Surgeon: JUANI Melendez III, MD;  Location: [facility] CATH LAB;  Service: Peripheral Vascular;  Laterality: Left;    STENT, DRUG ELUTING, SINGLE VESSEL, CORONARY N/A 2/28/2024    Procedure: Stent, Drug Eluting, Single Vessel, Coronary;  Surgeon: Tylor Lincoln MD;  Location: [facility] CATH LAB;  Service: Cardiology;  Laterality: N/A;    TREATMENT OF CARDIAC ARRHYTHMIA N/A 3/28/2022    Procedure: CARDIOVERSION;  Surgeon: Migue Blunt MD;  Location: [facility] EP LAB;  Service: Cardiology;  Laterality: N/A;  AF, DCC, MAC, GP, 3 PREP*RODRIGO deferred pt 100% compliant*    VENOPLASTY  6/3/2024    Procedure: ANGIOPLASTY, VEIN;  Surgeon: JUANI Melendez III, MD;  Location: [facility];  Service: Vascular;;   Left subclavian       Review of patient's allergies indicates:   Allergen Reactions    Ace inhibitors Swelling    Verapamil Other (See Comments)     Other reaction(s): Unknown    Povidone-iodine Itching     Current Facility-Administered Medications   Medication Frequency    acetaminophen tablet 500 mg Q6H PRN    albuterol-ipratropium 2.5 mg-0.5 mg/3 mL nebulizer solution 3 mL Q4H PRN    apixaban tablet 5 mg BID    atorvastatin tablet 80 mg Daily    clopidogreL tablet 75 mg Daily    dextrose 10% bolus 125 mL 125 mL PRN    dextrose 10% bolus 250 mL 250 mL PRN    fluticasone propionate 50 mcg/actuation nasal spray 50 mcg Daily PRN    glucagon (human recombinant) injection 1 mg PRN    glucose chewable tablet 16 g PRN    glucose chewable tablet 24 g PRN    nitroGLYCERIN SL tablet 0.4 mg Q5 Min PRN    pantoprazole EC tablet 40 mg Daily    predniSONE tablet 5 mg Daily    sodium chloride 0.9% flush 10 mL Q12H PRN    tacrolimus capsule 1 mg Daily AM    traMADoL tablet 50 mg Q8H PRN     Family History       Problem Relation (Age of Onset)    Heart disease Father    Kidney disease Sister, Brother    Kidney failure Sister, Brother    No Known Problems Sister, Brother, Sister, Sister    Thyroid disease Mother          Tobacco Use    Smoking status: Every Day     Current packs/day: 0.00     Average packs/day: 0.5 packs/day for 40.0 years (20.0 ttl pk-yrs)     Types: Cigarettes     Start date: 1982     Last attempt to quit: 2022     Years since quittin.7    Smokeless tobacco: Never    Tobacco comments:     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     Patient is currently retired   Substance and Sexual Activity    Alcohol use: No    Drug use: No    Sexual activity: Yes     Partners: Female     Review of Systems   Constitutional:  Negative for chills and fever.   HENT:  Negative for congestion.    Respiratory:  Negative for shortness of breath.    Cardiovascular:  Positive for  chest pain (now resolved).   Gastrointestinal:  Positive for diarrhea (secondary to colonoscopy prep). Negative for nausea and vomiting.   Genitourinary:  Positive for decreased urine volume (ESRD).   Musculoskeletal:  Negative for myalgias.     Objective:     Vital Signs (Most Recent):  Temp: 97.5 °F (36.4 °C) (12/07/24 0521)  Pulse: (!) 50 (12/07/24 1205)  Resp: 17 (12/07/24 0756)  BP: 135/64 (12/07/24 1205)  SpO2: 99 % (12/07/24 1205) Vital Signs (24h Range):  Temp:  [97.5 °F (36.4 °C)-98.3 °F (36.8 °C)] 97.5 °F (36.4 °C)  Pulse:  [48-61] 50  Resp:  [15-22] 17  SpO2:  [97 %-100 %] 99 %  BP: (114-146)/(58-67) 135/64     Weight: 76.2 kg (168 lb) (12/07/24 1205)  Body mass index is 22.16 kg/m².  Body surface area is 1.98 meters squared.    I/O last 3 completed shifts:  In: 240 [P.O.:240]  Out: -      Physical Exam  Constitutional:       General: He is not in acute distress.     Appearance: He is not ill-appearing.   Eyes:      Conjunctiva/sclera: Conjunctivae normal.   Cardiovascular:      Rate and Rhythm: Bradycardia present.      Arteriovenous access: Left arteriovenous access is present.     Comments: LUE AVF  Pulmonary:      Effort: Pulmonary effort is normal. No respiratory distress.   Abdominal:      General: There is no distension.      Palpations: Abdomen is soft.      Tenderness: There is no abdominal tenderness.   Musculoskeletal:      Right lower leg: No edema.      Left lower leg: No edema.   Skin:     General: Skin is warm and dry.   Neurological:      General: No focal deficit present.      Mental Status: He is alert.   Psychiatric:         Mood and Affect: Mood normal.          Significant Labs:  CBC:   Recent Labs   Lab 12/07/24 0418   WBC 6.75   RBC 4.49*   HGB 12.1*   HCT 37.2*      MCV 83   MCH 26.9*   MCHC 32.5     CMP:   Recent Labs   Lab 12/07/24 0418   GLU 88   CALCIUM 8.3*   ALBUMIN 2.8*   PROT 6.7      K 3.7   CO2 22*   CL 96   BUN 42*   CREATININE 9.0*   ALKPHOS 71   ALT 29    AST 22   BILITOT 0.4     All labs within the past 24 hours have been reviewed.    Assessment/Plan:     Cardiac/Vascular  * Chest pain in patient with CAD (coronary artery disease)  -management per primary  -cardiology consulted    Renal/  ESRD (end stage renal disease) on dialysis  69 year old male hx of ESRD on HD TTS admitted for chest pain with elevated troponin. Chest pain currently resolved. Cardiology on board. Nephrology consulted for HD.     Nephrology History  -Outpatient HD unit: [facility]  -Nephrologist: Immanuel COTTO  -HD tx days: TTS  -HD tx time: 3:30  -Last HD tx: 12/4/24  -HD access: AVF  -HD modality: iHD  -Residual urine: +  -EDW:  78.5 kg    Plan/Recommendations  -No need for emergent RRT, electrolytes stable.   -Per primary plan to possibly discharge today. Will plan for HD this afternoon prior to discharge to resume TTS schedule  -Low BFR/DFR with HD in the setting of chest pain with elevated troponin, no interventions at this time per cardiology, chest pain has completely resolved  -Will resume MWF schedule if patient remains IP, if discharged may present to OP unit Monday  -Hgb goal 10-11, at goal, no indication for Epo  -Strict I&Os  -Pre & post HD weight  -Phos 6.1, not currently on binders, would trend after HD if patient remains IP  -Renal diet if not NPO         Thank you for your consult. I will follow-up with patient. Please contact us if you have any additional questions.    Wendy Ibanez PA-C  Nephrology  [facility] - Internal Medicine Telemetry

## 2024-12-07 NOTE — ASSESSMENT & PLAN NOTE
69 y.o. male with CAD s/p stent placements (1 stent placed in 2006, 2 stents placed in 02/2024), CKD s/p renal transplant (in 1992) on hemodialysis (TTS), HTN, CHF presents to the ED w/ complaints of left sided pressure like CP that resolved with nitro. Cardiology consulted for CP.   PCI 2/2024 to RCA with good result. C 2/2024 with 3vCAD: prox/mid LAD 60%, OM1 70%, OM2 70%, RCA 95->0% post PCI.  CTS had declined patient for CABG and patient underwent PCI to the RCA on 02/28/2024. Mid LAD 70% + OM1/OM2 70% obstruction remained medically managed as thought was that culprit vessel (RCA) was addressed.   Patient was recent off eliquis and plavix for planned screening colonoscopy 12/6. Sent to the ED due to chest pain and procedure was cancelled.      Trop 46 > 49 > 56> 64> 57 ( less than 20 % increase)   EKG with sinus bradycardia without ST/T wave changes  Cxr normal  CP free on my interview.   HEART score 6   Bedside TTE overnight at baseline no WMA     Resumed continue AC, Plavix and Statin and monitor for S/Sx of angina/ACS. Continue to monitor on telemetry.   Pending formal TTE  Start IMDUR for BP and anti-anginal

## 2024-12-07 NOTE — DISCHARGE SUMMARY
[facility] - Internal Medicine [facility] Medicine  Discharge Summary      Patient Name: Ibrahima Phelps  MRN: [id number]  SHAVONNE: [id number]  Patient Class: OP- Observation  Admission Date: 12/6/2024  Hospital Length of Stay: 0 days  Discharge Date and Time:  12/07/2024 2:46 PM  Attending Physician: Byron Rosenberg MD   Discharging Provider: Byron Rosenberg MD  Primary Care Provider: Anatoliy Colindres MD  [facility] Medicine Team: [facility] MED A Byron Rosenberg MD  Primary Care Team: [facility]    HPI:   Ibrahima Phelps is a 69 y.o. male a PMHx significant for CAD s/p stent placements (1 stent placed in 2006, 2 stents placed in 02/2024), CKD s/p renal transplant (in 1992) on hemodialysis (TTS), HTN, CHF presents to the ED w/ complaints of left sided pressure like CP that resolved with nitro.    History obtained from patient, wife at bedside and ED notes. Patient states that at about 06:00 this morning he experienced a 10 minute episode of left-sided CP did not radiate as well as a brief episode of SOB and lightheadedness at that time; patient took 1 sublingual nitroglycerin which completely alleviated his pain has had no symptoms since this occurrence.  He has been holding his Eliquis for 2 days and Plavix for 5 days in anticipation of a colonoscopy today. He went for surveillance colonoscopy this morning as pain had resolved but was sent to be evaluated to ER and [id number] was cancelled. Colonoscopy was for colon polyps that were found in the past.  Patient denies HA, syncope, f/c, cough, hemoptysis, recent illnesses, flank pain, abdominal pain N/V/D, dysuria/hematuria, lower extremity edema/erythema.     Trop 46 > 49 > 56  EKG with sinus bradycardia without ST/T wave changes  Cxr normal  HM to admit for obs.    * No surgery found *      Hospital Course:   69 y.o. male with CAD s/p stent placements (1 stent placed in 2006, 2 stents placed in 02/2024), CKD s/p renal transplant (in 1992) on hemodialysis (TTS), HTN, CHF  presents to the ED w/ complaints of left sided pressure like CP that resolved with nitro. EKG (sinus bradycardia without ST/T wave changes) non ischemic without CP recurrence on admission. CXR normal. Cardiology consulted for elevated troponin and recommended to restart AC, Plavix and Statin . Cardiology initially recommended to start patient on imdur 30 mg daily but later recommended to not prescribe on discharge and to discuss with outpatient cardiology and nephrology teams as BP has been low at home per patient and family. Bedside TTE at baseline no WMA, formal echo with EF 60-65%. Patient to benefit from cardiology follow up as OP.           -TTE 12/7  with EF 60 - 65%. Diastolic function cannot be reliably determined in the presence of mitral annular calcification. LA severely dilated, RA moderately dilated. mild pHTn with PASP 38 mmHg  - Follow up with outpatient cardiology team  - Follow up with outpatient nephrology team for hypotension with hemodialysis         Goals of Care Treatment Preferences:  Code Status: Full Code    Health care agent: Pt's wife is CRISTY  Health care agent number: No value filed.                   SDOH Screening:  The patient was screened for utility difficulties, food insecurity, transport difficulties, housing insecurity, and interpersonal safety and there were no concerns identified this admission.     Consults:   Consults (From admission, onward)          Status Ordering Provider     Inpatient consult to Cardiology  Once        Provider:  (Not yet assigned)    Completed ALLYSON MARTINEZ     Inpatient consult to Nephrology  Once        Provider:  (Not yet assigned)    Completed ALLYSON MARTINEZ            * Chest pain in patient with CAD (coronary artery disease)  Patient with known CAD s/p stent placement, which is controlled Will continue Plavix and Statin and monitor for S/Sx of angina/ACS. Continue to monitor on telemetry.     EKG non ischemic   Elevated troponin 46 > 49 > 56 - could be  related to CKD and iso holding plavix and   Trend troponin  C/w statin and Plavix  ED continued Eliquis  Cardiology consult    Paroxysmal atrial fibrillation   Patient is currently in sinus rhythm. [id number] Score: 2. The patients heart rate in the last 24 hours is as follows:  Pulse  Min: 50  Max: 56     Antiarrhythmics       Anticoagulants  apixaban tablet 5 mg, 2 times daily, Oral    Plan  - Replete lytes with a goal of K>4, Mg >2  - Patient is anticoagulated, see medications listed above.  - Patient's afib is currently controlled        H/O kidney transplant  Nephrology consulted  Tacro level daily   Tacro 1 mg daily  C/w home prednisone  HD dependent patient - TTS - last session Wednesday as he was getting colonoscopy prep - HD per renal      Mixed hyperlipidemia  C/w home statin      Primary hypertension  Patient's blood pressure range in the last 24 hours was: BP  Min: 138/63  Max: 176/71.The patient's inpatient anti-hypertensive regimen is listed below:  Current Antihypertensives  nitroGLYCERIN SL tablet 0.4 mg, Every 5 min PRN, Sublingual    Plan  - BP is controlled, no changes needed to their regimen  - Hold doxazosin as patient reports low BP at home for past few days, unclear if on nifedipine      Final Active Diagnoses:    Diagnosis Date Noted POA    PRINCIPAL PROBLEM:  Chest pain in patient with CAD (coronary artery disease) [I25.10]  Yes     Chronic    ESRD (end stage renal disease) on dialysis [N18.6, Z99.2] 02/02/2024 Not Applicable    Paroxysmal atrial fibrillation [I48.0] 09/25/2017 Yes     Chronic    H/O kidney transplant [Z94.0] 07/10/2015 Not Applicable     Chronic    Mixed hyperlipidemia [E78.2]  Yes     Chronic    Primary hypertension [I10]  Yes      Problems Resolved During this Admission:       Discharged Condition: stable    Disposition: Home or Self Care    Follow Up:    Patient Instructions:      Ambulatory referral/consult to Cardiology   Standing Status: Future   Referral Priority:  Routine Referral Type: Consultation   Referral Reason: Specialty Services Required   Requested Specialty: Cardiology   Number of Visits Requested: 1     Notify your health care provider if you experience any of the following:  severe uncontrolled pain     Notify your health care provider if you experience any of the following:  difficulty breathing or increased cough     Notify your health care provider if you experience any of the following:  persistent dizziness, light-headedness, or visual disturbances     Notify your health care provider if you experience any of the following:  increased confusion or weakness     Activity as tolerated       Significant Diagnostic Studies: Labs: All labs within the past 24 hours have been reviewed    Pending Diagnostic Studies:       None           Medications:  Reconciled Home Medications:      Medication List        CONTINUE taking these medications      acetaminophen 500 MG tablet  Commonly known as: TYLENOL  Take 1 tablet (500 mg total) by mouth every 6 (six) hours as needed for Pain.     albuterol 90 mcg/actuation inhaler  Commonly known as: VENTOLIN HFA  Inhale 2 puffs into the lungs every 6 (six) hours as needed for Wheezing or Shortness of Breath. Rescue     albuterol-ipratropium 2.5 mg-0.5 mg/3 mL nebulizer solution  Commonly known as: DUO-NEB  Take 3 mLs by nebulization every 4 (four) hours as needed for Wheezing. Rescue     ALLEGRA ORAL  Take 1 tablet by mouth daily as needed (allergies).     amiodarone 200 MG Tab  Commonly known as: PACERONE  TAKE 1 TABLET BY MOUTH EVERY DAY     atorvastatin 80 MG tablet  Commonly known as: LIPITOR  Take 1 tablet (80 mg total) by mouth once daily.     b complex vitamins tablet  Commonly known as: B COMPLEX-VITAMIN B12  Take 1 tablet by mouth once daily.     CALCIUM 600 ORAL  Take 1 tablet by mouth 2 (two) times a day.     CHOLECALCIFEROL (VITAMIN D3) ORAL  Take 2 tablets by mouth 2 (two) times daily.     clopidogreL 75 mg  tablet  Commonly known as: PLAVIX  Take 1 tablet (75 mg total) by mouth once daily.     doxazosin 4 MG tablet  Commonly known as: CARDURA  Take 1 tablet (4 mg total) by mouth every 12 (twelve) hours.     ELIQUIS 5 mg Tab  Generic drug: apixaban  TAKE 1 TABLET BY MOUTH TWICE A DAY     famotidine 20 MG tablet  Commonly known as: PEPCID  TAKE 1 TABLET (20 MG TOTAL) BY MOUTH ONCE DAILY. ON DIALYSIS DAYS, PLEASE TAKE THIS MEDICINE AFTER DIALYSIS.     fluticasone furoate-vilanteroL 100-25 mcg/dose diskus inhaler  Commonly known as: BREO  Inhale 1 puff into the lungs once daily. Controller. Use this inhaler every day.     fluticasone propionate 50 mcg/actuation nasal spray  Commonly known as: FLONASE  1 spray (50 mcg total) by Each Nostril route daily as needed for Allergies.     gabapentin 100 MG capsule  Commonly known as: NEURONTIN  Take 1 capsule (100 mg total) by mouth as needed (pain).     metoprolol succinate 25 MG 24 hr tablet  Commonly known as: TOPROL-XL  Take 0.5 tablets (12.5 mg total) by mouth once daily.     multivitamin per tablet  Commonly known as: THERAGRAN  Take 1 tablet by mouth Daily.     NIFEdipine 30 MG (OSM) 24 hr tablet  Commonly known as: PROCARDIA-XL  Take 1 tablet (30 mg total) by mouth 2 (two) times a day.     nitroGLYCERIN 0.4 MG SL tablet  Commonly known as: NITROSTAT  Place 1 tablet (0.4 mg total) under the tongue every 5 (five) minutes as needed for Chest pain.     omeprazole 20 MG capsule  Commonly known as: PRILOSEC  Take 1 capsule (20 mg total) by mouth daily as needed (heartburn).     predniSONE 5 MG tablet  Commonly known as: DELTASONE  Take 1 tablet (5 mg total) by mouth once daily.     pulse oximeter device  Commonly known as: pulse oximeter  by Apply Externally route 2 (two) times a day. Use twice daily at 8 AM and 3 PM and [facility] as [facility].     tacrolimus 1 MG Cap  Commonly known as: PROGRAF  Take 3 capsules (3 mg total) by mouth every 12 (twelve) hours for  60 days, THEN 2 capsules (2 mg total) every 12 (twelve) hours for 60 days, THEN 1 capsule (1 mg total) every 12 (twelve) hours for 60 days, THEN 1 capsule (1 mg total) once daily.  Start taking on: March 2, 2024     traMADoL 50 mg tablet  Commonly known as: ULTRAM  Take 1 tablet (50 mg total) by mouth every 6 (six) hours as needed for Pain.              Indwelling Lines/Drains at time of discharge:   Lines/Drains/Airways       Drain  Duration                  Hemodialysis AV Fistula Left upper arm -- days         Hemodialysis AV Fistula 02/19/24 0924 Left upper arm 292 days                    Time spent on the discharge of patient: 45 minutes         Byron Rosenberg MD  Department of Hospital Medicine  [facility] - Internal Medicine Telemetry

## 2024-12-07 NOTE — SUBJECTIVE & OBJECTIVE
"Past Medical History:   Diagnosis Date    Atrial fibrillation     CAD (coronary artery disease) 2006    MI in 2006    CHF (congestive heart failure) 2017    CKD (chronic kidney disease) stage 3, GFR 30-59 ml/min     Dr. Latif.  in transplanted kidney    CKD (chronic kidney disease) stage 5, GFR less than 15 ml/min 02/02/2024    COVID-19 02/07/2023    Diverticulosis     Hyperlipidemia     Hypertension     Keloid cicatrix     Metabolic bone disease     Other emphysema 10/01/2019    Pericarditis     S/P kidney transplant 1992    x2 (1992 & 2005),    Thrombocytopenia     Thyroid disease     Tobacco use 09/05/2014       Past Surgical History:   Procedure Laterality Date    AV FISTULA PLACEMENT Left 12/18/2023    Procedure: CREATION, AV FISTULA;  Surgeon: JUANI Melendez III, MD;  [facility];  Service: Vascular;  Laterality: Left;  LUE AVF creation vs AVG insertion    CARDIOVERSION  04/30/15    CHOLECYSTECTOMY      COLONOSCOPY  April 20, 2011    Diverticulosis, repeat recommended in 3 yrs., repeat colonoscopy 2014 revealed 2 polyps.  He should return in 5 years.    COLONOSCOPY N/A 3/13/2020    Procedure: COLONOSCOPY;  Surgeon: Chon Casper MD;  Location: [facility] (4TH FLR);  Service: Endoscopy;  Laterality: N/A;  ok to hold coumadin x5days-see telephone encounter 2/4/20-tb    COLONOSCOPY N/A 2/4/2021    Procedure: COLONOSCOPY;  Surgeon: Chon Casper MD;  Location: [facility] (4TH FLR);  Service: Endoscopy;  Laterality: N/A;  Eliquis - per Dr. GAVIN Blunt ok to hold x 2 days and "restarted asap"- ERW  last prep "poor", [id number] ordered/ Pt requests Dr. Casper  prep ins. mailed - COVID screening on 2/1/21 PCW- ERW    COLONOSCOPY N/A 3/3/2021    Procedure: COLONOSCOPY;  Surgeon: Chon Casper MD;  Location: [facility] (4TH FLR);  Service: Endoscopy;  Laterality: N/A;  Patient with his second poor bowel pre and has poor prep today.  If patient not intersted or can't get colonoscopy tomorrow will " need constipation bowel prep and will need to restart his Eliquis today.Thanks,Chon     per Dr Lopez to hold Eliquis 2 days prior      COVID     CORONARY ANGIOGRAPHY N/A 2/28/2024    Procedure: ANGIOGRAM, CORONARY ARTERY;  Surgeon: Tylor Lincoln MD;  Location: [facility] CATH LAB;  Service: Cardiology;  Laterality: N/A;    CORONARY ANGIOPLASTY WITH STENT PLACEMENT  9/13/2006    FISTULOGRAM N/A 2/19/2024    Procedure: Fistulogram;  Surgeon: JUANI Melendez III, MD;  Location: [facility] CATH LAB;  Service: Peripheral Vascular;  Laterality: N/A;    FISTULOGRAM, WITH PTA Left 6/3/2024    Procedure: FISTULOGRAM, WITH PTA;  Surgeon: JUANI Melendez III, MD;  Location: [facility] OR [facility] FLR;  Service: Vascular;  Laterality: Left;  mGy: 51.58  GyCm2: 6.8285  Fluoro time: 5.1 min  Contrast: 28 cc    IRRIGATION AND DEBRIDEMENT Right 8/30/2023    Procedure: IRRIGATION AND DEBRIDEMENT, RIGHT MIDDLE FINGER;  Surgeon: Martin Larsen MD;  Location: [facility] OR [facility] FLR;  Service: Orthopedics;  Laterality: Right;    KIDNEY TRANSPLANT  2005    LEFT HEART CATHETERIZATION N/A 2/26/2024    Procedure: Left heart cath;  Surgeon: Tylor Lincoln MD;  Location: [facility] CATH LAB;  Service: Cardiology;  Laterality: N/A;    PARATHYROIDECTOMY      PERCUTANEOUS TRANSLUMINAL ANGIOPLASTY OF ARTERIOVENOUS FISTULA Left 2/19/2024    Procedure: PTA, AV FISTULA;  Surgeon: JUANI Melendez III, MD;  Location: [facility] CATH LAB;  Service: Peripheral Vascular;  Laterality: Left;    STENT, DRUG ELUTING, SINGLE VESSEL, CORONARY N/A 2/28/2024    Procedure: Stent, Drug Eluting, Single Vessel, Coronary;  Surgeon: Tylor Lincoln MD;  Location: [facility] CATH LAB;  Service: Cardiology;  Laterality: N/A;    TREATMENT OF CARDIAC ARRHYTHMIA N/A 3/28/2022    Procedure: CARDIOVERSION;  Surgeon: Migue Blunt MD;  Location: [facility] EP LAB;  Service: Cardiology;  Laterality: N/A;  AF, DCC, MAC, GP, 3 PREP*RODRIGO deferred pt 100% compliant*    VENOPLASTY  6/3/2024    Procedure:  ANGIOPLASTY, VEIN;  Surgeon: JUANI Melendez III, MD;  Location: [facility] OR [address] [place] [address];  Service: Vascular;;  Left subclavian       Review of patient's allergies indicates:   Allergen Reactions    Ace inhibitors Swelling    Verapamil Other (See Comments)     Other reaction(s): Unknown    Povidone-iodine Itching     Current Facility-Administered Medications   Medication Frequency    acetaminophen tablet 500 mg Q6H PRN    albuterol-ipratropium 2.5 mg-0.5 mg/3 mL nebulizer solution 3 mL Q4H PRN    apixaban tablet 5 mg BID    atorvastatin tablet 80 mg Daily    clopidogreL tablet 75 mg Daily    dextrose 10% bolus 125 mL 125 mL PRN    dextrose 10% bolus 250 mL 250 mL PRN    fluticasone propionate 50 mcg/actuation nasal spray 50 mcg Daily PRN    glucagon (human recombinant) injection 1 mg PRN    glucose chewable tablet 16 g PRN    glucose chewable tablet 24 g PRN    nitroGLYCERIN SL tablet 0.4 mg Q5 Min PRN    pantoprazole EC tablet 40 mg Daily    predniSONE tablet 5 mg Daily    sodium chloride 0.9% flush 10 mL Q12H PRN    tacrolimus capsule 1 mg Daily AM    traMADoL tablet 50 mg Q8H PRN     Family History       Problem Relation (Age of Onset)    Heart disease Father    Kidney disease Sister, Brother    Kidney failure Sister, Brother    No Known Problems Sister, Brother, Sister, Sister    Thyroid disease Mother          Tobacco Use    Smoking status: Every Day     Current packs/day: 0.00     Average packs/day: 0.5 packs/day for 40.0 years (20.0 ttl pk-yrs)     Types: Cigarettes     Start date: 1982     Last attempt to quit: 2022     Years since quittin.7    Smokeless tobacco: Never    Tobacco comments:     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     Patient is currently retired   Substance and Sexual Activity    Alcohol use: No    Drug use: No    Sexual activity: Yes     Partners: Female     Review of Systems   Constitutional:  Negative for chills and fever.   HENT:  Negative  for congestion.    Respiratory:  Negative for shortness of breath.    Cardiovascular:  Positive for chest pain (now resolved).   Gastrointestinal:  Positive for diarrhea (secondary to colonoscopy prep). Negative for nausea and vomiting.   Genitourinary:  Positive for decreased urine volume (ESRD).   Musculoskeletal:  Negative for myalgias.     Objective:     Vital Signs (Most Recent):  Temp: 97.5 °F (36.4 °C) (12/07/24 0521)  Pulse: (!) 50 (12/07/24 1205)  Resp: 17 (12/07/24 0756)  BP: 135/64 (12/07/24 1205)  SpO2: 99 % (12/07/24 1205) Vital Signs (24h Range):  Temp:  [97.5 °F (36.4 °C)-98.3 °F (36.8 °C)] 97.5 °F (36.4 °C)  Pulse:  [48-61] 50  Resp:  [15-22] 17  SpO2:  [97 %-100 %] 99 %  BP: (114-146)/(58-67) 135/64     Weight: 76.2 kg (168 lb) (12/07/24 1205)  Body mass index is 22.16 kg/m².  Body surface area is 1.98 meters squared.    I/O last 3 completed shifts:  In: 240 [P.O.:240]  Out: -      Physical Exam  Constitutional:       General: He is not in acute distress.     Appearance: He is not ill-appearing.   Eyes:      Conjunctiva/sclera: Conjunctivae normal.   Cardiovascular:      Rate and Rhythm: Bradycardia present.      Arteriovenous access: Left arteriovenous access is present.     Comments: LUE AVF  Pulmonary:      Effort: Pulmonary effort is normal. No respiratory distress.   Abdominal:      General: There is no distension.      Palpations: Abdomen is soft.      Tenderness: There is no abdominal tenderness.   Musculoskeletal:      Right lower leg: No edema.      Left lower leg: No edema.   Skin:     General: Skin is warm and dry.   Neurological:      General: No focal deficit present.      Mental Status: He is alert.   Psychiatric:         Mood and Affect: Mood normal.          Significant Labs:  CBC:   Recent Labs   Lab 12/07/24 0418   WBC 6.75   RBC 4.49*   HGB 12.1*   HCT 37.2*      MCV 83   MCH 26.9*   MCHC 32.5     CMP:   Recent Labs   Lab 12/07/24 0418   GLU 88   CALCIUM 8.3*   ALBUMIN  2.8*   PROT 6.7      K 3.7   CO2 22*   CL 96   BUN 42*   CREATININE 9.0*   ALKPHOS 71   ALT 29   AST 22   BILITOT 0.4     All labs within the past 24 hours have been reviewed.

## 2024-12-07 NOTE — ASSESSMENT & PLAN NOTE
69 year old male hx of ESRD on HD TTS admitted for chest pain with elevated troponin. Chest pain currently resolved. Cardiology on board. Nephrology consulted for HD.     Nephrology History  -Outpatient HD unit: [facility]  -Nephrologist: Immanuel COTTO  -HD tx days: TTS  -HD tx time: 3:30  -Last HD tx: 12/4/24  -HD access: AVF  -HD modality: iHD  -Residual urine: +  -EDW:  78.5 kg    Plan/Recommendations  -No need for emergent RRT, electrolytes stable.   -Per primary plan to possibly discharge today. Will plan for HD this afternoon prior to discharge to resume TTS schedule  -Low BFR/DFR with HD in the setting of chest pain with elevated troponin, no interventions at this time per cardiology, chest pain has completely resolved  -Will resume MWF schedule if patient remains IP, if discharged may present to OP unit Monday  -Hgb goal 10-11, at goal, no indication for Epo  -Strict I&Os  -Pre & post HD weight  -Phos 6.1, not currently on binders, would trend after HD if patient remains IP  -Renal diet if not NPO

## 2024-12-07 NOTE — HPI
Ibrahima Phelps is a 69 y.o. male a PMHx significant for CAD s/p stent placements (1 stent placed in 2006, 2 stents placed in 02/2024), CKD s/p renal transplant (in 1992) on hemodialysis (TTS), HTN, CHF presents to the ED w/ complaints of left sided pressure like CP that resolved with nitro.     History obtained from EMR review: Patient states that at about 06:00 this morning he experienced a 10 minute episode of left-sided CP did not radiate as well as a brief episode of SOB and lightheadedness at that time; patient took 1 sublingual nitroglycerin which completely alleviated his pain has had no symptoms since this occurrence.  He has been holding his Eliquis for 2 days and Plavix for 5 days in anticipation of a colonoscopy today. He went for surveillance colonoscopy this morning as pain had resolved but was sent to be evaluated to ER and [id number] was cancelled. Colonoscopy was for colon polyps that were found in the past.  Patient denies HA, syncope, f/c, cough, hemoptysis, recent illnesses, flank pain, abdominal pain N/V/D, dysuria/hematuria, lower extremity edema/erythema.     At admission, troponin trending up 46 > 49 > 56. EKG with sinus bradycardia without ST/T wave changes. Admitted to  for observation, cardiology consulted and recommend resuming anticoagulation and obtaining Echo. Nephrology consulted for management of ESRD.

## 2024-12-07 NOTE — CONSULTS
[facility] - Internal Medicine Telemetry  Cardiology  Consult Note    Patient Name: Ibrahima Phelps  MRN: [id number]  Admission Date: 12/6/2024  Hospital Length of Stay: 0 days  Code Status: Full Code   Attending Provider: Byron Rosenberg MD   Consulting Provider: Holden Pacheco MD  Primary Care Physician: Anatoliy Colindres MD  Principal Problem:CAD (coronary artery disease)    Patient information was obtained from patient, spouse/SO, parent, relative(s), EMS personnel, nursing home, caregiver / friend, past medical records, law enforcement, and ER records.     Inpatient consult to Cardiology  Consult performed by: Holden Pacheco MD  Consult ordered by: Byron Rosenberg MD        Subjective:        HPI:   69 y.o. male with CAD s/p stent placements (1 stent placed in 2006, 2 stents placed in 02/2024), CKD s/p renal transplant (in 1992) on hemodialysis (TTS), HTN, CHF presents to the ED w/ complaints of left sided pressure like CP that resolved with nitro. Cardiology consulted for reassurance on CP.      History obtained from patient, wife at bedside and ED notes. Patient states that at about 06:00 this morning he experienced a 10 minute episode of left-sided CP did not radiate as well as a brief episode of SOB and lightheadedness at that time; patient took 1 sublingual nitroglycerin which completely alleviated his pain has had no symptoms since this occurrence.    He has been holding his Eliquis for 2 days and Plavix for 5 days in anticipation of a colonoscopy today. He went for surveillance colonoscopy this morning as pain had resolved but was sent to be evaluated to ER and [id number] was cancelled. Colonoscopy was for colon polyps that were found in the past.  Patient denies HA, syncope, f/c, cough, hemoptysis, recent illnesses, flank pain, abdominal pain N/V/D, dysuria/hematuria, lower extremity edema/erythema.      Trop 46 > 49 > 56  EKG with sinus bradycardia without ST/T wave changes  Cxr normal  CP free on my  "interview.         Past Medical History:   Diagnosis Date    Atrial fibrillation     CAD (coronary artery disease) 2006    MI in 2006    CHF (congestive heart failure) 2017    CKD (chronic kidney disease) stage 3, GFR 30-59 ml/min     Dr. Latif.  in transplanted kidney    CKD (chronic kidney disease) stage 5, GFR less than 15 ml/min 02/02/2024    COVID-19 02/07/2023    Diverticulosis     Hyperlipidemia     Hypertension     Keloid cicatrix     Metabolic bone disease     Other emphysema 10/01/2019    Pericarditis     S/P kidney transplant 1992    x2 (1992 & 2005),    Thrombocytopenia     Thyroid disease     Tobacco use 09/05/2014       Past Surgical History:   Procedure Laterality Date    AV FISTULA PLACEMENT Left 12/18/2023    Procedure: CREATION, AV FISTULA;  Surgeon: JUANI Melendez III, MD;  [facility];  Service: Vascular;  Laterality: Left;  LUE AVF creation vs AVG insertion    CARDIOVERSION  04/30/15    CHOLECYSTECTOMY      COLONOSCOPY  April 20, 2011    Diverticulosis, repeat recommended in 3 yrs., repeat colonoscopy 2014 revealed 2 polyps.  He should return in 5 years.    COLONOSCOPY N/A 3/13/2020    Procedure: COLONOSCOPY;  Surgeon: Chon Casper MD;  Location: [facility] (4TH FLR);  Service: Endoscopy;  Laterality: N/A;  ok to hold coumadin x5days-see telephone encounter 2/4/20-tb    COLONOSCOPY N/A 2/4/2021    Procedure: COLONOSCOPY;  Surgeon: Chon Casper MD;  Location: [facility] (4TH FLR);  Service: Endoscopy;  Laterality: N/A;  Eliquis - per Dr. GAVIN Blunt ok to hold x 2 days and "restarted asap"- ERW  last prep "poor", [id number] ordered/ Pt requests Dr. Casper  prep ins. mailed - COVID screening on 2/1/21 PCW- ERW    COLONOSCOPY N/A 3/3/2021    Procedure: COLONOSCOPY;  Surgeon: Chon Casper MD;  Location: [facility] (4TH FLR);  Service: Endoscopy;  Laterality: N/A;  Patient with his second poor bowel pre and has poor prep today.  If patient not intersted or can't get " colonoscopy tomorrow will need constipation bowel prep and will need to restart his Eliquis today.Thanks,Chon     per Dr Lopez to hold Eliquis 2 days prior      COVID     CORONARY ANGIOGRAPHY N/A 2/28/2024    Procedure: ANGIOGRAM, CORONARY ARTERY;  Surgeon: Tylor Lincoln MD;  Location: [facility] CATH LAB;  Service: Cardiology;  Laterality: N/A;    CORONARY ANGIOPLASTY WITH STENT PLACEMENT  9/13/2006    FISTULOGRAM N/A 2/19/2024    Procedure: Fistulogram;  Surgeon: JUANI Melendez III, MD;  Location: [facility] CATH LAB;  Service: Peripheral Vascular;  Laterality: N/A;    FISTULOGRAM, WITH PTA Left 6/3/2024    Procedure: FISTULOGRAM, WITH PTA;  Surgeon: JUANI Melendez III, MD;  Location: [facility] OR [facility] FLR;  Service: Vascular;  Laterality: Left;  mGy: 51.58  GyCm2: 6.8285  Fluoro time: 5.1 min  Contrast: 28 cc    IRRIGATION AND DEBRIDEMENT Right 8/30/2023    Procedure: IRRIGATION AND DEBRIDEMENT, RIGHT MIDDLE FINGER;  Surgeon: Martin Larsen MD;  Location: [facility] OR [facility] FLR;  Service: Orthopedics;  Laterality: Right;    KIDNEY TRANSPLANT  2005    LEFT HEART CATHETERIZATION N/A 2/26/2024    Procedure: Left heart cath;  Surgeon: Tylor Lincoln MD;  Location: [facility] CATH LAB;  Service: Cardiology;  Laterality: N/A;    PARATHYROIDECTOMY      PERCUTANEOUS TRANSLUMINAL ANGIOPLASTY OF ARTERIOVENOUS FISTULA Left 2/19/2024    Procedure: PTA, AV FISTULA;  Surgeon: JUANI Melendez III, MD;  Location: [facility] CATH LAB;  Service: Peripheral Vascular;  Laterality: Left;    STENT, DRUG ELUTING, SINGLE VESSEL, CORONARY N/A 2/28/2024    Procedure: Stent, Drug Eluting, Single Vessel, Coronary;  Surgeon: Tylor Lincoln MD;  Location: [facility] CATH LAB;  Service: Cardiology;  Laterality: N/A;    TREATMENT OF CARDIAC ARRHYTHMIA N/A 3/28/2022    Procedure: CARDIOVERSION;  Surgeon: Migue Blunt MD;  Location: [facility] EP LAB;  Service: Cardiology;  Laterality: N/A;  AF, DCC, MAC, GP, 3 PREP*RODRIGO deferred pt 100% compliant*     VENOPLASTY  6/3/2024    Procedure: ANGIOPLASTY, VEIN;  Surgeon: JUANI Melendez III, MD;  Location: [facility] OR [address] [place] [address];  Service: Vascular;;  Left subclavian       Review of patient's allergies indicates:   Allergen Reactions    Ace inhibitors Swelling    Verapamil Other (See Comments)     Other reaction(s): Unknown    Povidone-iodine Itching       No current facility-administered medications on file prior to encounter.     Current Outpatient Medications on File Prior to Encounter   Medication Sig    acetaminophen (TYLENOL) 500 MG tablet Take 1 tablet (500 mg total) by mouth every 6 (six) hours as needed for Pain.    albuterol (VENTOLIN HFA) 90 mcg/actuation inhaler Inhale 2 puffs into the lungs every 6 (six) hours as needed for Wheezing or Shortness of Breath. Rescue    albuterol-ipratropium (DUO-NEB) 2.5 mg-0.5 mg/3 mL nebulizer solution Take 3 mLs by nebulization every 4 (four) hours as needed for Wheezing. Rescue    amiodarone (PACERONE) 200 MG Tab TAKE 1 TABLET BY MOUTH EVERY DAY    atorvastatin (LIPITOR) 80 MG tablet Take 1 tablet (80 mg total) by mouth once daily.    b complex vitamins (B COMPLEX-VITAMIN B12) tablet Take 1 tablet by mouth once daily.    calcium carbonate (CALCIUM 600 ORAL) Take 1 tablet by mouth 2 (two) times a day.    CHOLECALCIFEROL, VITAMIN D3, ORAL Take 2 tablets by mouth 2 (two) times daily.    clopidogreL (PLAVIX) 75 mg tablet Take 1 tablet (75 mg total) by mouth once daily.    doxazosin (CARDURA) 4 MG tablet Take 1 tablet (4 mg total) by mouth every 12 (twelve) hours.    ELIQUIS 5 mg Tab TAKE 1 TABLET BY MOUTH TWICE A DAY    famotidine (PEPCID) 20 MG tablet TAKE 1 TABLET (20 MG TOTAL) BY MOUTH ONCE DAILY. ON DIALYSIS DAYS, PLEASE TAKE THIS MEDICINE AFTER DIALYSIS.    fexofenadine HCl (ALLEGRA ORAL) Take 1 tablet by mouth daily as needed (allergies).    fluticasone furoate-vilanteroL (BREO) 100-25 mcg/dose diskus inhaler Inhale 1 puff into the lungs once daily. Controller. Use this  inhaler every day.    fluticasone propionate (FLONASE) 50 mcg/actuation nasal spray 1 spray (50 mcg total) by Each Nostril route daily as needed for Allergies.    gabapentin (NEURONTIN) 100 MG capsule Take 1 capsule (100 mg total) by mouth as needed (pain).    metoprolol succinate (TOPROL-XL) 25 MG 24 hr tablet Take 0.5 tablets (12.5 mg total) by mouth once daily.    multivitamin (THERAGRAN) per tablet Take 1 tablet by mouth Daily.    NIFEdipine (PROCARDIA-XL) 30 MG (OSM) 24 hr tablet Take 1 tablet (30 mg total) by mouth 2 (two) times a day.    nitroGLYCERIN (NITROSTAT) 0.4 MG SL tablet Place 1 tablet (0.4 mg total) under the tongue every 5 (five) minutes as needed for Chest pain.    omeprazole (PRILOSEC) 20 MG capsule Take 1 capsule (20 mg total) by mouth daily as needed (heartburn).    predniSONE (DELTASONE) 5 MG tablet Take 1 tablet (5 mg total) by mouth once daily.    pulse oximeter (PULSE OXIMETER) device by Apply Externally route 2 (two) times a day. Use twice daily at 8 AM and 3 PM and [facility] the [facility] as directed.    tacrolimus (PROGRAF) 1 MG Cap Take 3 capsules (3 mg total) by mouth every 12 (twelve) hours for 60 days, THEN 2 capsules (2 mg total) every 12 (twelve) hours for 60 days, THEN 1 capsule (1 mg total) every 12 (twelve) hours for 60 days, THEN 1 capsule (1 mg total) once daily.    traMADoL (ULTRAM) 50 mg tablet Take 1 tablet (50 mg total) by mouth every 6 (six) hours as needed for Pain.     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     Patient is currently retired   Substance and Sexual Activity    Alcohol use: No    Drug use: No    Sexual activity: Yes     Partners: Female     Review of Systems   Constitutional: Negative for chills and fever.   Cardiovascular:  Negative for chest pain, orthopnea and palpitations.   Respiratory:  Negative for cough and shortness of breath.    Gastrointestinal:  Negative for abdominal pain.   Neurological:  Negative for dizziness, headaches and light-headedness.   Psychiatric/Behavioral:  Negative for altered mental status.      Objective:     Vital Signs (Most Recent):  Temp: 98 °F (36.7 °C) (12/06/24 1715)  Pulse: 60 (12/06/24 1715)  Resp: 18 (12/06/24 1715)  BP: 135/65 (12/06/24 1715)  SpO2: 100 % (12/06/24 1715) Vital Signs (24h Range):  Temp:  [97.5 °F (36.4 °C)-98 °F (36.7 °C)] 98 °F (36.7 °C)  Pulse:  [50-60] 60  Resp:  [17-22] 18  SpO2:  [100 %] 100 %  BP: (135-176)/(62-71) 135/65     Weight: 76.2 kg (168 lb)  Body mass index is 22.16 kg/m².    SpO2: 100 %       No intake or output data in the 24 hours ending 12/06/24 1742    Lines/Drains/Airways       Peripheral Intravenous Line  Duration                  Hemodialysis AV Fistula Left upper arm -- days         Hemodialysis AV Fistula 02/19/24 0924 Left upper arm 291 days         Peripheral IV - Single Lumen 12/06/24 1000 20 G Right Forearm <1 day                     Physical Exam  Vitals and nursing note reviewed.   Constitutional:       General: He is not in acute distress.     Appearance: Normal appearance. He is not ill-appearing or toxic-appearing.   HENT:      Head: Normocephalic and atraumatic.      Mouth/Throat:      Mouth: Mucous membranes are moist.   Cardiovascular:      Rate and Rhythm: Normal rate and regular rhythm.      Heart sounds: No murmur heard.     No friction rub. No gallop.   Pulmonary:      Effort: Pulmonary  "effort is normal. No respiratory distress.      Breath sounds: Normal breath sounds.   Abdominal:      Palpations: Abdomen is soft.   Musculoskeletal:      Right lower leg: No edema.      Left lower leg: No edema.      Comments: Left arm fistula, good thrill   Skin:     General: Skin is warm.   Neurological:      Mental Status: He is alert and oriented to person, place, and time. Mental status is at baseline.          Significant Labs: ABG: No results for input(s): "PH", "PCO2", "HCO3", "POCSATURATED", "BE" in the last 48 hours., Blood Culture: No results for input(s): "LABBLOO" in the last 48 hours., BMP:   Recent Labs   Lab 12/06/24  0800 12/06/24  1042   GLU  --  75   NA  --  135*   K 3.6 3.8   CL  --  96   CO2  --  19*   BUN  --  36*   CREATININE  --  7.8*   CALCIUM  --  8.5*   , CMP   Recent Labs   Lab 12/06/24  0800 12/06/24  1042   NA  --  135*   K 3.6 3.8   CL  --  96   CO2  --  19*   GLU  --  75   BUN  --  36*   CREATININE  --  7.8*   CALCIUM  --  8.5*   PROT  --  7.1   ALBUMIN  --  2.9*   BILITOT  --  0.4   ALKPHOS  --  58   AST  --  33   ALT  --  38   ANIONGAP  --  20*   , CBC   Recent Labs   Lab 12/06/24  1042   WBC 8.74   HGB 11.3*   HCT 35.6*      , INR No results for input(s): "INR", "PROTIME" in the last 48 hours., Lipid Panel No results for input(s): "CHOL", "HDL", "LDLCALC", "TRIG", "CHOLHDL" in the last 48 hours.,   Pathology Results  (Last 10 years)                 03/03/21 1340  Specimen to Pathology, Surgery Gastrointestinal tract Final result    Narrative:  Pre-op Diagnosis: History of adenomatous polyp of colon   [Z86.010]   Current use of long term anticoagulation [Z79.01]   Procedure(s):   COLONOSCOPY   Number of specimens: 1   Name of specimens:   jar1) sigmoid colon biopsy r/o IBD/dysplasia   Release to patient->7 Day Delay   Specimen total (fresh, frozen, permanent):->1           , and Troponin No results for input(s): "TROPONINI" in the last 48 hours.   Assessment and Plan:     * " Chest pain in patient with CAD (coronary artery disease)  69 y.o. male with CAD s/p stent placements (1 stent placed in 2006, 2 stents placed in 02/2024), CKD s/p renal transplant (in 1992) on hemodialysis (TTS), HTN, CHF presents to the ED w/ complaints of left sided pressure like CP that resolved with nitro. Cardiology consulted for reassurance on CP.       Trop 46 > 49 > 56 ( less than 20 % increase)   EKG with sinus bradycardia without ST/T wave changes  Cxr normal  CP free on my interview.   HEART score 6   Bedside TTE at baseline no WMA     Patient with known CAD s/p stent placement, which is controlled   Will continue AC, Plavix and Statin and monitor for S/Sx of angina/ACS. Continue to monitor on telemetry.   Order formal TTE,  May benefit from IMDUR   Contact us for recurrent chest pain         VTE Risk Mitigation (From admission, onward)           Ordered     apixaban tablet 5 mg  2 times daily         12/06/24 1995                    Thank you for your consult. I will follow-up with patient. Please contact us if you have any additional questions.    Holden Pacheco MD  Cardiology   [facility] - Internal Medicine Telemetry

## 2024-12-07 NOTE — PLAN OF CARE
Case Management Final Discharge Note      Discharge Disposition: home with family    New DME ordered / company name: none    Relevant SDOH / Transition of Care Barriers:  none    Primary Caretaker and contact information: self    Scheduled followup appointment:  Cardiology    Referrals placed: none    Transportation: Patient wife at bedside to transport patient home.         Patient and family educated on discharge services and updated on DC plan. Bedside RN notified, patient clear to discharge from Case Management Perspective.      [facility] - Internal Medicine Telemetry  Discharge Final Note    Primary Care Provider: Anatoliy Colindres MD    Expected Discharge Date: 12/7/2024    Final Discharge Note (most recent)       Final Note - 12/07/24 1510          Final Note    Assessment Type Final Discharge Note     Anticipated Discharge Disposition Home or Self Care     Hospital Resources/Appts/Education Provided Provided patient/caregiver with written discharge plan information;Appointments scheduled and added to AVS        Post-Acute Status    Discharge Delays None known at this time                     Important Message from Medicare

## 2024-12-07 NOTE — SUBJECTIVE & OBJECTIVE
Interval History: patient is well rested and not in distress    ROS  Objective:     Vital Signs (Most Recent):  Temp: 97.5 °F (36.4 °C) (12/07/24 0521)  Pulse: 61 (12/07/24 0756)  Resp: 17 (12/07/24 0756)  BP: 135/64 (12/07/24 0756)  SpO2: 99 % (12/07/24 0756) Vital Signs (24h Range):  Temp:  [97.5 °F (36.4 °C)-98.3 °F (36.8 °C)] 97.5 °F (36.4 °C)  Pulse:  [48-61] 61  Resp:  [15-22] 17  SpO2:  [97 %-100 %] 99 %  BP: (114-176)/(58-71) 135/64     Weight: 80.5 kg (177 lb 7.5 oz)  Body mass index is 23.41 kg/m².     SpO2: 99 %         Intake/Output Summary (Last 24 hours) at 12/7/2024 1014  Last data filed at 12/7/2024 0932  Gross per 24 hour   Intake 480 ml   Output --   Net 480 ml       Lines/Drains/Airways       Peripheral Intravenous Line  Duration                  Hemodialysis AV Fistula Left upper arm -- days         Hemodialysis AV Fistula 02/19/24 0924 Left upper arm 292 days         Peripheral IV - Single Lumen 12/06/24 1000 20 G Right Forearm 1 day                       Physical Exam  Constitutional:       General: He is not in acute distress.     Appearance: Normal appearance. He is not ill-appearing, toxic-appearing or diaphoretic.   HENT:      Head: Normocephalic and atraumatic.   Eyes:      Pupils: Pupils are equal, round, and reactive to light.   Cardiovascular:      Rate and Rhythm: Normal rate and regular rhythm.      Pulses: Normal pulses.      Heart sounds: Normal heart sounds.   Pulmonary:      Effort: Pulmonary effort is normal. No respiratory distress.      Breath sounds: Normal breath sounds. No wheezing or rales.   Abdominal:      General: Bowel sounds are normal.      Palpations: Abdomen is soft.   Musculoskeletal:      Right lower leg: No edema.      Left lower leg: No edema.   Skin:     Capillary Refill: Capillary refill takes less than 2 seconds.   Neurological:      General: No focal deficit present.      Mental Status: He is alert and oriented to person, place, and time. Mental status is at  baseline.   Psychiatric:         Mood and Affect: Mood normal.         Behavior: Behavior normal.         Thought Content: Thought content normal.         Judgment: Judgment normal.            Significant Labs: All pertinent lab results from the last 24 hours have been reviewed.    Significant Imaging: All pertinent imaging results from the last 24 hours have been reviewed.

## 2024-12-07 NOTE — ASSESSMENT & PLAN NOTE
69 y.o. male with CAD s/p stent placements (1 stent placed in 2006, 2 stents placed in 02/2024), CKD s/p renal transplant (in 1992) on hemodialysis (TTS), HTN, CHF presents to the ED w/ complaints of left sided pressure like CP that resolved with nitro. Cardiology consulted for reassurance on CP.       Trop 46 > 49 > 56 ( less than 20 % increase)   EKG with sinus bradycardia without ST/T wave changes  Cxr normal  CP free on my interview.   HEART score 6   Bedside TTE at baseline no WMA     Patient with known CAD s/p stent placement, which is controlled   Will continue AC, Plavix and Statin and monitor for S/Sx of angina/ACS. Continue to monitor on telemetry.   Order formal TTE,  May benefit from IMDUR   Contact us for recurrent chest pain

## 2024-12-07 NOTE — PLAN OF CARE
Problem: Adult Inpatient Plan of Care  Goal: Plan of Care Review  Outcome: Progressing  Goal: Patient-Specific Goal (Individualized)  Outcome: Progressing  Goal: Absence of Hospital-Acquired Illness or Injury  Outcome: Progressing  Goal: Optimal Comfort and Wellbeing  Outcome: Progressing  Goal: Readiness for Transition of Care  Outcome: Progressing     Problem: Wound  Goal: Optimal Coping  Outcome: Progressing  Goal: Optimal Functional Ability  Outcome: Progressing  Goal: Absence of Infection Signs and Symptoms  Outcome: Progressing  Goal: Improved Oral Intake  Outcome: Progressing  Goal: Optimal Pain Control and Function  Outcome: Progressing  Goal: Skin Health and Integrity  Outcome: Progressing  Goal: Optimal Wound Healing  Outcome: Progressing     Problem: Hemodialysis  Goal: Safe, Effective Therapy Delivery  Outcome: Progressing  Goal: Effective Tissue Perfusion  Outcome: Progressing  Goal: Absence of Infection Signs and Symptoms  Outcome: Progressing

## 2024-12-07 NOTE — NURSING
Dialysis tx started to the left arm AVF per orders placed by MARISA Ibanez:      Order Questions    Question Answer   Antibiotics on HD? No   Duration of Treatment 3.5 hours   Dialyzer [id number]   Dialysate Temperature (C) 36.5   Target  mL/min   If unable to maintain flow due to inadequate vascular access patency, patient intolerance (i.e. chest pain, access discomfort) or elevated venous pressure, adjust blood flow rate to a minimum of _____mL/min 100    mL/min   K+ Potassium per Protocol   Ca++ Calcium per Protocol   Na+ Sodium per Protocol   Bicarb Bicarbonate per Protocol   Access to be used Other (please specify)   Target UF 1L   If unable to maintain this UFR due to patient intolerance (i.e. hypotension, chest pain, muscle cramping, nausea or vomiting), adjust UFR to achieve a minimum of _______ liters of UF 0   Fluid Removal Instructions maintain SBP > 90 mmHG

## 2024-12-07 NOTE — DISCHARGE INSTRUCTIONS
.Our goal at [facility] is to always give you outstanding care and exceptional service. You may receive a survey by mail, text or e-mail in the next 7-10 days from Sona Shook and our leadership team asking about the care you received with us. The survey should only take 5-10 minutes to complete and is very important to us.     Your feedback provides us with a way to recognize our staff who work tirelessly to provide the best care! Also, your responses help us learn how to improve when your experience was below our aspiration of excellence. We WILL use your feedback to continue making improvements to help us provide the highest quality care. We keep your personal information and feedback confidential. We appreciate your time completing this survey and can't wait to hear from you!!!     We want you to leave us today feeling like you can DEFINITELY recommend us to others! We look forward to your continued care with us! Thanks so much for choosing [facility] for your healthcare needs!

## 2024-12-08 NOTE — NURSING
DC instructions provided to patient and wife. Patient and wife voice no questions or concerns. IV to RFA removed, site clear, catheter intact. Patient ambulated down to personal vehicle with personal belongings and wife at side.

## 2024-12-08 NOTE — PLAN OF CARE
Problem: Hemodialysis  Goal: Safe, Effective Therapy Delivery  Outcome: Progressing  Goal: Effective Tissue Perfusion  Outcome: Progressing  Goal: Absence of Infection Signs and Symptoms  Outcome: Progressing     Dialysis tx ended to the left arm AVF, hemostasis achieved.    Net fluid removed: 2L    Report given to nurse Gloria. Pt awaiting transport from [facility] to room 1157 by wheelchair

## 2024-12-09 ENCOUNTER — OFFICE VISIT (OUTPATIENT)
Dept: CARDIOLOGY | Facility: CLINIC | Age: 69
End: 2024-12-09
Payer: MEDICARE

## 2024-12-09 ENCOUNTER — TELEPHONE (OUTPATIENT)
Dept: CARDIOLOGY | Facility: CLINIC | Age: 69
End: 2024-12-09
Payer: MEDICARE

## 2024-12-09 VITALS
SYSTOLIC BLOOD PRESSURE: 120 MMHG | HEIGHT: 73 IN | BODY MASS INDEX: 24.11 KG/M2 | DIASTOLIC BLOOD PRESSURE: 49 MMHG | HEART RATE: 66 BPM | WEIGHT: 181.88 LBS

## 2024-12-09 DIAGNOSIS — Z95.5 HISTORY OF CORONARY ANGIOPLASTY WITH INSERTION OF STENT: ICD-10-CM

## 2024-12-09 DIAGNOSIS — N18.6 ESRD (END STAGE RENAL DISEASE) ON DIALYSIS: ICD-10-CM

## 2024-12-09 DIAGNOSIS — I48.0 PAROXYSMAL ATRIAL FIBRILLATION: Chronic | ICD-10-CM

## 2024-12-09 DIAGNOSIS — Z94.0 H/O KIDNEY TRANSPLANT: Chronic | ICD-10-CM

## 2024-12-09 DIAGNOSIS — Z99.2 ESRD (END STAGE RENAL DISEASE) ON DIALYSIS: ICD-10-CM

## 2024-12-09 DIAGNOSIS — I65.29 STENOSIS OF CAROTID ARTERY, UNSPECIFIED LATERALITY: Chronic | ICD-10-CM

## 2024-12-09 DIAGNOSIS — Z87.891 FORMER SMOKER: ICD-10-CM

## 2024-12-09 DIAGNOSIS — R07.9 CHEST PAIN, UNSPECIFIED TYPE: ICD-10-CM

## 2024-12-09 DIAGNOSIS — I25.2 HISTORY OF NON-ST ELEVATION MYOCARDIAL INFARCTION (NSTEMI): ICD-10-CM

## 2024-12-09 DIAGNOSIS — I70.0 AORTIC ATHEROSCLEROSIS: ICD-10-CM

## 2024-12-09 DIAGNOSIS — I10 PRIMARY HYPERTENSION: Primary | ICD-10-CM

## 2024-12-09 DIAGNOSIS — I35.0 MILD AORTIC STENOSIS: ICD-10-CM

## 2024-12-09 DIAGNOSIS — Z99.2 STAGE 5 CHRONIC KIDNEY DISEASE ON CHRONIC DIALYSIS: ICD-10-CM

## 2024-12-09 DIAGNOSIS — I25.10 CORONARY ARTERY DISEASE INVOLVING NATIVE CORONARY ARTERY OF NATIVE HEART WITHOUT ANGINA PECTORIS: Chronic | ICD-10-CM

## 2024-12-09 DIAGNOSIS — N18.6 STAGE 5 CHRONIC KIDNEY DISEASE ON CHRONIC DIALYSIS: ICD-10-CM

## 2024-12-09 DIAGNOSIS — E78.2 MIXED HYPERLIPIDEMIA: Chronic | ICD-10-CM

## 2024-12-09 DIAGNOSIS — I13.0 HYPERTENSIVE HEART AND RENAL DISEASE WITH RENAL FAILURE, STAGE 1 THROUGH STAGE 4 OR UNSPECIFIED CHRONIC KIDNEY DISEASE, WITH HEART FAILURE: ICD-10-CM

## 2024-12-09 PROCEDURE — 99999 PR PBB SHADOW E&M-EST. PATIENT-LVL V: CPT | Mod: PBBFAC,HCNC,, | Performed by: INTERNAL MEDICINE

## 2024-12-09 RX ORDER — MULTIVIT-MIN/FA/LYCOPEN/LUTEIN .4-300-25
1 TABLET ORAL DAILY
COMMUNITY
Start: 2024-11-16

## 2024-12-09 NOTE — TELEPHONE ENCOUNTER
----- Message from  Talat sent at 12/7/2024  3:08 PM CST -----  Regarding: Hospital Follow up  Good afternoon,    Patient is discharging from [facility], today 12/7/24 and needs to follow up with Dr. Vail. First available is not until March 2025. Please call patient or patient wife to advise.     Thank you!

## 2024-12-09 NOTE — PATIENT INSTRUCTIONS
Assessment/Plan:  Ibrahima Phelps is a 69 y.o. male with CAD s/p PCI 2006, PCI to RCA w/ 2 DIEGO 2/28/2024, paroxysmal Afib (on amiodarone and Eliquis), HFpEF, PAD, S/P kidney transplant on immunosuppressive therapy with Tacrolimus and prednisone, COPD, smoking, who presents for an initial appointment.    CAD s/p PCI 2006, PCI to RCA w/ 2 DIEGO 2/28/2024- Stable with no angina currently. Holding off on start imdur due to low blood pressure. Continue Eliquis 5 mg bid, plavix 75 mg daily, and atorvastatin 80 mg daily.     2. Paroxysmal Afib- Continue amiodarone and Eliquis.    3. HFpEF- Stable.  Echo as per above. Continue current medications.     4. Overweight- Encourage diet, exercise, and weight loss.     Follow up in 3 months

## 2024-12-09 NOTE — PROGRESS NOTES
"[facility]      Chief Complaint   Patient presents with    Hospital Follow Up       Patient ID: Ibrahima Phelps is a 69 y.o. male with CAD s/p PCI 2006, PCI to RCA w/ 2 DIEGO 2/28/2024, paroxysmal Afib (on amiodarone and Eliquis), HFpEF, PAD, S/P kidney transplant on immunosuppressive therapy with Tacrolimus and prednisone, smoking, who presents for an initial appointment.  Pertinent history/events are as follows:     -At our initial clinic visit on 7/20/2023, Mr. Phelps reports SOB on exertion which started in 12/2022.  He  reports no chest pain or chest discomfort.  He was recently started on amiodarone for Afib.  Smokes 5 cigarettes a day for 53 years.  EKG today shows sinus bradycardia with Premature atrial complexes with Aberrant conduction/LVH with repolarization abnormality.  Plan:  SOB on Exertion- Appears chronic based on chart review.  Pt with normal nuclear stress test in 4/2022.  Check PET stress test to rule out myocardial ischemia.  Paroxysmal Afib- Continue amiodarone and Eliquis.  HFpEF- Stable.  Check PET stress test to rule out myocardial ischemia.  Smoking- Encourage smoking cessation.  Overweight- Encourage diet, exercise, and weight loss.   Snoring- Check sleep study of evaluate for TONYA.     -Pt admitted 12/6-12/7/2024 with chest pain (details below as per discharge summary):  Hospital Course:   "69 y.o. male with CAD s/p stent placements (1 stent placed in 2006, 2 stents placed in 02/2024), CKD s/p renal transplant (in 1992) on hemodialysis (TTS), HTN, CHF presents to the ED w/ complaints of left sided pressure like CP that resolved with nitro. EKG (sinus bradycardia without ST/T wave changes) non ischemic without CP recurrence on admission. CXR normal. Cardiology consulted for elevated troponin and recommended to restart AC, Plavix and Statin . Cardiology initially recommended to start patient on imdur 30 mg daily but later recommended to not prescribe on discharge and to discuss " "with outpatient cardiology and nephrology teams as BP has been low at home per patient and family. Bedside TTE at baseline no WMA, formal echo with EF 60-65%. Patient to benefit from cardiology follow up as OP.    -TTE 12/7  with EF 60 - 65%. Diastolic function cannot be reliably determined in the presence of mitral annular calcification. LA severely dilated, RA moderately dilated. mild pHTn with PASP 38 mmHg"    HPI:  Mr. Phelps reports feeling better since hospital discharge. He has no chest pain or SOB. States blood pressure has been low at home at 111/53. Blood pressure in clinic today is 120/49.  States due to low blood pressures, he is not currently taking metoprolol succinate 25 mg daily or nifedipine 30 mg daily.    Past Medical History:   Diagnosis Date    Atrial fibrillation     CAD (coronary artery disease) 2006    MI in 2006    CHF (congestive heart failure) 2017    CKD (chronic kidney disease) stage 3, GFR 30-59 ml/min     Dr. Latif.  in transplanted kidney    CKD (chronic kidney disease) stage 5, GFR less than 15 ml/min 02/02/2024    COVID-19 02/07/2023    Diverticulosis     Hyperlipidemia     Hypertension     Keloid cicatrix     Metabolic bone disease     Other emphysema 10/01/2019    Pericarditis     S/P kidney transplant 1992    x2 (1992 & 2005),    Thrombocytopenia     Thyroid disease     Tobacco use 09/05/2014     Past Surgical History:   Procedure Laterality Date    AV FISTULA PLACEMENT Left 12/18/2023    Procedure: CREATION, AV FISTULA;  Surgeon: JUANI Melendez III, MD;  Location: [facility] OR [address] [place] [address];  Service: Vascular;  Laterality: Left;  LUE AVF creation vs AVG insertion    CARDIOVERSION  04/30/15    CHOLECYSTECTOMY      COLONOSCOPY  April 20, 2011    Diverticulosis, repeat recommended in 3 yrs., repeat colonoscopy 2014 revealed 2 polyps.  He should return in 5 years.    COLONOSCOPY N/A 3/13/2020    Procedure: COLONOSCOPY;  Surgeon: Chon Casper MD;  Location: [facility] (4TH FLR);  " "Service: Endoscopy;  Laterality: N/A;  ok to hold coumadin x5days-see telephone encounter 2/4/20-tb    COLONOSCOPY N/A 2/4/2021    Procedure: COLONOSCOPY;  Surgeon: Chon Casper MD;  Location: [facility] (4TH FLR);  Service: Endoscopy;  Laterality: N/A;  Eliquis - per Dr. GAVIN Blunt ok to hold x 2 days and "restarted asap"- ERW  last prep "poor", [id number] ordered/ Pt requests Dr. Casper  prep ins. mailed - COVID screening on 2/1/21 PCW- ERW    COLONOSCOPY N/A 3/3/2021    Procedure: COLONOSCOPY;  Surgeon: Chon Casper MD;  Location: [facility] (4TH FLR);  Service: Endoscopy;  Laterality: N/A;  Patient with his second poor bowel pre and has poor prep today.  If patient not intersted or can't get colonoscopy tomorrow will need constipation bowel prep and will need to restart his Eliquis today.Thanks,Chon     per Dr Blunt-ok to hold Eliquis 2 days prior      COVID     COLONOSCOPY N/A 12/6/2024    Procedure: COLONOSCOPY;  Surgeon: Chon Casper MD;  Location: [facility] (2ND FLR);  Service: Endoscopy;  Laterality: N/A;  Plavix-Eliquis pending/ HD T-Th-Sat- lab ordered  2/25/24 ECHO PA 49  ref by / [facility] portal-RN  11/29-message to clearance nurse for plavix and eliquis hold-tb  ok to hold Eliquis 2 days per Dr Parmar  ok to hold Plavix 5 days per MEENA CUNNINGHAM-GT  12/2- p    CORONARY ANGIOGRAPHY N/A 2/28/2024    Procedure: ANGIOGRAM, CORONARY ARTERY;  Surgeon: Tylor Lincoln MD;  Location: [facility] CATH LAB;  Service: Cardiology;  Laterality: N/A;    CORONARY ANGIOPLASTY WITH STENT PLACEMENT  9/13/2006    FISTULOGRAM N/A 2/19/2024    Procedure: Fistulogram;  Surgeon: JUANI Melendez III, MD;  Location: [facility] CATH LAB;  Service: Peripheral Vascular;  Laterality: N/A;    FISTULOGRAM, WITH PTA Left 6/3/2024    Procedure: FISTULOGRAM, WITH PTA;  Surgeon: JUANI Melendez III, MD;  Location: [facility] OR 2ND FLR;  Service: Vascular;  Laterality: Left;  mGy: 51.58  GyCm2: 6.8285  Fluoro time: 5.1 " min  Contrast: 28 cc    IRRIGATION AND DEBRIDEMENT Right 2023    Procedure: IRRIGATION AND DEBRIDEMENT, RIGHT MIDDLE FINGER;  Surgeon: Martin Larsen MD;  Location: [address];  Service: Orthopedics;  Laterality: Right;    KIDNEY TRANSPLANT  2005    LEFT HEART CATHETERIZATION N/A 2024    Procedure: Left heart cath;  Surgeon: Tylor Lincoln MD;  Location: [facility] CATH LAB;  Service: Cardiology;  Laterality: N/A;    PARATHYROIDECTOMY      PERCUTANEOUS TRANSLUMINAL ANGIOPLASTY OF ARTERIOVENOUS FISTULA Left 2024    Procedure: PTA, AV FISTULA;  Surgeon: JUANI Melendez III, MD;  Location: [facility] CATH LAB;  Service: Peripheral Vascular;  Laterality: Left;    STENT, DRUG ELUTING, SINGLE VESSEL, CORONARY N/A 2024    Procedure: Stent, Drug Eluting, Single Vessel, Coronary;  Surgeon: Tylor Lincoln MD;  Location: [facility] CATH LAB;  Service: Cardiology;  Laterality: N/A;    TREATMENT OF CARDIAC ARRHYTHMIA N/A 3/28/2022    Procedure: CARDIOVERSION;  Surgeon: Migue Blunt MD;  Location: [facility] EP LAB;  Service: Cardiology;  Laterality: N/A;  AF, DCC, MAC, GP, 3 PREP*RODRIGO deferred pt 100% compliant*    VENOPLASTY  6/3/2024    Procedure: ANGIOPLASTY, VEIN;  Surgeon: JUANI Melendez III, MD;  Location: [address];  Service: Vascular;;  Left subclavian     Social History     Socioeconomic History    Marital status:    Occupational History    Occupation:    Tobacco Use    Smoking status: Every Day     Current packs/day: 0.00     Average packs/day: 0.5 packs/day for 40.0 years (20.0 ttl pk-yrs)     Types: Cigarettes     Start date: 1982     Last attempt to quit: 2022     Years since quittin.7    Smokeless tobacco: Never    Tobacco comments:     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     Patient is currently retired   Substance and Sexual Activity    Alcohol use: No    Drug use: No    Sexual activity: Yes     Partners:  Female   Social History Narrative    Retired      Social Drivers of Health     Financial Resource Strain: Low Risk  (12/6/2024)    Overall Financial Resource Strain (CARDIA)     Difficulty of Paying Living Expenses: Not hard at all   Food Insecurity: No Food Insecurity (12/6/2024)    Hunger Vital Sign     Worried About Running Out of Food in the Last Year: Never true     Ran Out of Food in the Last Year: Never true   Transportation Needs: No Transportation Needs (12/6/2024)    TRANSPORTATION NEEDS     Transportation : No   Physical Activity: Inactive (12/6/2024)    Exercise Vital Sign     Days of Exercise per Week: 0 days     Minutes of Exercise per Session: 0 min   Stress: Stress Concern Present (12/6/2024)    [employer] - Occupational Stress Questionnaire     Feeling of Stress : To some extent   Housing Stability: Low Risk  (12/6/2024)    Housing Stability Vital Sign     Unable to Pay for Housing in the Last Year: No     Homeless in the Last Year: No     Family History   Problem Relation Name Age of Onset    No Known Problems Sister      No Known Problems Brother      Thyroid disease Mother          s/p surgery    Heart disease Father          had pacemaker    No Known Problems Sister      Kidney failure Sister      Kidney disease Sister      No Known Problems Sister      Kidney disease Brother      Kidney failure Brother          s/p transplant    Diabetes Mellitus Neg Hx      Stroke Neg Hx      Heart attack Neg Hx      Cancer Neg Hx      Celiac disease Neg Hx      Cirrhosis Neg Hx      Colon cancer Neg Hx      Esophageal cancer Neg Hx      Inflammatory bowel disease Neg Hx      Rectal cancer Neg Hx      Stomach cancer Neg Hx      Ulcerative colitis Neg Hx      Liver disease Neg Hx      Liver cancer Neg Hx      Crohn's disease Neg Hx      Melanoma Neg Hx         Review of patient's allergies indicates:   Allergen Reactions    Ace inhibitors Swelling    Verapamil Other  (See Comments)     Other reaction(s): Unknown    Povidone-iodine Itching       Medication List with Changes/Refills   Current Medications    ACETAMINOPHEN (TYLENOL) 500 MG TABLET    Take 1 tablet (500 mg total) by mouth every 6 (six) hours as needed for Pain.    ALBUTEROL (VENTOLIN HFA) 90 MCG/ACTUATION INHALER    Inhale 2 puffs into the lungs every 6 (six) hours as needed for Wheezing or Shortness of Breath. Rescue    ALBUTEROL-IPRATROPIUM (DUO-NEB) 2.5 MG-0.5 MG/3 ML NEBULIZER SOLUTION    Take 3 mLs by nebulization every 4 (four) hours as needed for Wheezing. Rescue    AMIODARONE (PACERONE) 200 MG TAB    TAKE 1 TABLET BY MOUTH EVERY DAY    ATORVASTATIN (LIPITOR) 80 MG TABLET    Take 1 tablet (80 mg total) by mouth once daily.    B COMPLEX VITAMINS (B COMPLEX-VITAMIN B12) TABLET    Take 1 tablet by mouth once daily.    CALCIUM CARBONATE (CALCIUM 600 ORAL)    Take 1 tablet by mouth 2 (two) times a day.    CHOLECALCIFEROL, VITAMIN D3, ORAL    Take 2 tablets by mouth 2 (two) times daily.    CLOPIDOGREL (PLAVIX) 75 MG TABLET    Take 1 tablet (75 mg total) by mouth once daily.    DOXAZOSIN (CARDURA) 4 MG TABLET    Take 1 tablet (4 mg total) by mouth every 12 (twelve) hours.    ELIQUIS 5 MG TAB    TAKE 1 TABLET BY MOUTH TWICE A DAY    FAMOTIDINE (PEPCID) 20 MG TABLET    TAKE 1 TABLET (20 MG TOTAL) BY MOUTH ONCE DAILY. ON DIALYSIS DAYS, PLEASE TAKE THIS MEDICINE AFTER DIALYSIS.    FEXOFENADINE HCL (ALLEGRA ORAL)    Take 1 tablet by mouth daily as needed (allergies).    FLUTICASONE FUROATE-VILANTEROL (BREO) 100-25 MCG/DOSE DISKUS INHALER    Inhale 1 puff into the lungs once daily. Controller. Use this inhaler every day.    FLUTICASONE PROPIONATE (FLONASE) 50 MCG/ACTUATION NASAL SPRAY    1 spray (50 mcg total) by Each Nostril route daily as needed for Allergies.    GABAPENTIN (NEURONTIN) 100 MG CAPSULE    Take 1 capsule (100 mg total) by mouth as needed (pain).    METOPROLOL SUCCINATE (TOPROL-XL) 25 MG 24 HR TABLET    Take  "0.5 tablets (12.5 mg total) by mouth once daily.    MULTIVIT-MIN-FA-LYCOPEN-LUTEIN (CENTRUM SILVER) 0.4 MG-300 MCG- 250 MCG TAB    Take 1 tablet by mouth once daily.    MULTIVITAMIN (THERAGRAN) PER TABLET    Take 1 tablet by mouth Daily.    NIFEDIPINE (PROCARDIA-XL) 30 MG (OSM) 24 HR TABLET    Take 1 tablet (30 mg total) by mouth 2 (two) times a day.    NITROGLYCERIN (NITROSTAT) 0.4 MG SL TABLET    Place 1 tablet (0.4 mg total) under the tongue every 5 (five) minutes as needed for Chest pain.    OMEPRAZOLE (PRILOSEC) 20 MG CAPSULE    Take 1 capsule (20 mg total) by mouth daily as needed (heartburn).    PREDNISONE (DELTASONE) 5 MG TABLET    Take 1 tablet (5 mg total) by mouth once daily.    PULSE OXIMETER (PULSE OXIMETER) DEVICE    by Apply Externally route 2 (two) times a day. Use twice daily at 8 AM and 3 PM and [facility] as directed.    TACROLIMUS (PROGRAF) 1 MG CAP    Take 3 capsules (3 mg total) by mouth every 12 (twelve) hours for 60 days, THEN 2 capsules (2 mg total) every 12 (twelve) hours for 60 days, THEN 1 capsule (1 mg total) every 12 (twelve) hours for 60 days, THEN 1 capsule (1 mg total) once daily.    TRAMADOL (ULTRAM) 50 MG TABLET    Take 1 tablet (50 mg total) by mouth every 6 (six) hours as needed for Pain.       Review of Systems  Constitution: Denies chills, fever, and sweats.  HENT: Denies headaches or blurry vision.  Cardiovascular: Denies chest pain or irregular heart beat.  Respiratory: Positive for shortness of breath on exertion.  Gastrointestinal: Denies abdominal pain, nausea, or vomiting.  Musculoskeletal: Denies muscle cramps.  Neurological: Denies dizziness or focal weakness.  Psychiatric/Behavioral: Normal mental status.  Hematologic/Lymphatic: Denies bleeding problem or easy bruising/bleeding.  Skin: Denies rash or suspicious lesions    Physical Examination  BP (!) 120/49   Pulse 66   Ht 6' 1" (1.854 m)   Wt 82.5 kg (181 lb 14.1 oz)   BMI 24.00 kg/m² "     Constitutional: No acute distress, conversant  HEENT: Sclera anicteric, Pupils equal, round and reactive to light, extraocular motions intact, Oropharynx clear  Neck: No JVD, no carotid bruits  Cardiovascular: regular rate and rhythm, no murmur, rubs or gallops, normal S1/S2  Pulmonary: Clear to auscultation bilaterally  Abdominal: Abdomen soft, nontender, nondistended, positive bowel sounds  Extremities: No lower extremity edema,   Pulses:  Carotid pulses are 2+ on the right side, and 2+ on the left side.  Radial pulses are 2+ on the right side, and 2+ on the left side.   Femoral pulses are 2+ on the right side, and 2+ on the left side.  Popliteal pulses are 2+ on the right side, and 2+ on the left side.   Dorsalis pedis pulses are 2+ on the right side, and 2+ on the left side.   Posterior tibial pulses are 2+ on the right side, and 2+ on the left side.    Skin: No ecchymosis, erythema, or ulcers  Psych: Alert and oriented x 3, appropriate affect  Neuro: CNII-XII intact, no focal deficits    Labs:  Most Recent Data  CBC:   Lab Results   Component Value Date    WBC 6.75 12/07/2024    HGB 12.1 (L) 12/07/2024    HCT 37.2 (L) 12/07/2024     12/07/2024    MCV 83 12/07/2024    RDW 15.9 (H) 12/07/2024     BMP:   Lab Results   Component Value Date     12/07/2024    K 3.7 12/07/2024    CL 96 12/07/2024    CO2 22 (L) 12/07/2024    BUN 42 (H) 12/07/2024    CREATININE 9.0 (H) 12/07/2024    GLU 88 12/07/2024    CALCIUM 8.3 (L) 12/07/2024    MG 2.3 12/07/2024    PHOS 6.1 (H) 12/07/2024     LFTS;   Lab Results   Component Value Date    PROT 6.7 12/07/2024    ALBUMIN 2.8 (L) 12/07/2024    BILITOT 0.4 12/07/2024    AST 22 12/07/2024    ALKPHOS 71 12/07/2024    ALT 29 12/07/2024     COAGS:   Lab Results   Component Value Date    INR 1.3 (H) 04/09/2024     FLP:   Lab Results   Component Value Date    CHOL 109 (L) 06/11/2024    CHOL 109 (L) 06/11/2024    HDL 54 06/11/2024    HDL 54 06/11/2024    LDLCALC 44.8 (L)  06/11/2024    LDLCALC 44.8 (L) 06/11/2024    TRIG 51 06/11/2024    TRIG 51 06/11/2024    CHOLHDL 49.5 06/11/2024    CHOLHDL 49.5 06/11/2024     CARDIAC:   Lab Results   Component Value Date    TROPONINI 3.311 (H) 04/11/2024     (H) 12/06/2024       Imaging:    EKG 7/20/2023:  Sinus bradycardia with Premature atrial complexes with Aberrant conduction   LVH with repolarization abnormality    Echo 12/7/2024:    Left Ventricle: The left ventricle is normal in size. Ventricular mass is normal. Normal wall thickness. Normal wall motion. There is normal systolic function with a visually estimated ejection fraction of 60 - 65%. Diastolic function cannot be reliably determined in the presence of mitral annular calcification. Normal left ventricular filling pressure.    Right Ventricle: Normal right ventricular cavity size. Wall thickness is normal. Right ventricle wall motion  is normal. Systolic function is normal.    Left Atrium: Left atrium is severely dilated.    Right Atrium: Right atrium is moderately dilated.    Aortic Valve: There is moderate aortic valve sclerosis. There is moderate annular calcification present. Mildly restricted motion. There is mild stenosis. Aortic valve area by VTI is 1.6 cm2. Aortic valve peak velocity is 2.6 m/s. Mean gradient is 18.7 mmHg. The dimensionless index is 0.48. There is mild aortic regurgitation with a centrally directed jet. AV morphology not well seen. Non and left coronary leaflet is calcified and restricted.    Mitral Valve: There is severe posterior mitral annular calcification present.    Aorta: Aortic root is normal in size measuring 3.45 cm. Ascending aorta is normal measuring 2.78 cm.    Pulmonary Artery: There is mild pulmonary hypertension. The estimated pulmonary artery systolic pressure is 38 mmHg.    IVC/SVC: Normal venous pressure at 3 mmHg.    Pericardium: There is no pericardial effusion.    PET Stress Test 9/13/2023:    There is a small sized, mild  intensity apical inferior and inferoseptal reversible perfusion abnormality involving 6% of LV myocardium indicative of focal coronary stenosis.    Within the perfusion abnormality, absolute myocardial perfusion (cc/min/gm) averaged 0.56 cc/min/g at rest, 0.71 cc/min/g at stress and CFR was 1.27 , which equates to severely reduced coronary flow capacity within the LAD territory.    There are no other significant perfusion abnormalities.    The whole heart absolute myocardial perfusion values averaged 0.74 cc/min/g at rest, which is normal; 1.17 cc/min/g at stress, which is mildly reduced; and CFR is 1.59 , which is mildly reduced.    CT attenuation images demonstrate severe diffuse coronary calcifications in the LAD, and moderate diffuse coronary calcifications in the LCX and RCA territory and mild diffuse aortic calcifications in the descending aorta.    The gated perfusion images showed an ejection fraction of 53% at rest and 56% during stress. A normal ejection fraction is greater than 47%.    The wall motion is normal at rest and during stress.    The LV cavity size is mildly enlarged at rest and stress.    The ECG portion of the study is abnormal but not diagnostic due to resting ST-T abnormalities.    There were no arrhythmias during stress.    The patient reported chest pain during the stress test.    There are no prior studies for comparison.    Echo 2/10/2023:  The left ventricle is normal in size with normal systolic function. The estimated ejection fraction is 65%.  Normal right ventricular size with normal right ventricular systolic function.  Grade II left ventricular diastolic dysfunction.  Severe left atrial enlargement.  There is mild aortic valve stenosis. Aortic valve area is 1.8 cm2; peak velocity is 2.9 m/s; mean gradient is 15 mmHg.  Pulmonary HTN - the estimated PA systolic pressure is > 41mm Hg. (The IVC was not well visualized.)    Assessment/Plan:  Ibrahima Phelps is a 69 y.o. male with CAD  s/p PCI 2006, PCI to RCA w/ 2 DIEGO 2/28/2024, paroxysmal Afib (on amiodarone and Eliquis), HFpEF, PAD, S/P kidney transplant on immunosuppressive therapy with Tacrolimus and prednisone, COPD, smoking, who presents for an initial appointment.    CAD s/p PCI 2006, PCI to RCA w/ 2 DIEGO 2/28/2024- Stable with no angina currently. Holding off on start imdur due to low blood pressure. Continue Eliquis 5 mg bid, plavix 75 mg daily, and atorvastatin 80 mg daily.     2. Paroxysmal Afib- Continue amiodarone and Eliquis.    3. HFpEF- Stable.  Echo as per above. Continue current medications.     4. Overweight- Encourage diet, exercise, and weight loss.     Follow up in 3 months     Total duration of face to face visit time 25 minutes.  Total time spent counseling greater than fifty percent of total visit time.  Counseling included discussion regarding imaging findings, diagnosis, possibilities, treatment options, risks and benefits.  The patient had many questions regarding the options and long-term effects.    Hussain Vail MD, PhD  Interventional Cardiology

## 2024-12-10 ENCOUNTER — PATIENT OUTREACH (OUTPATIENT)
Dept: ADMINISTRATIVE | Facility: CLINIC | Age: 69
End: 2024-12-10
Payer: MEDICARE

## 2024-12-10 DIAGNOSIS — R07.9 CHEST PAIN, UNSPECIFIED TYPE: Primary | ICD-10-CM

## 2024-12-10 RX ORDER — NITROGLYCERIN 0.4 MG/1
0.4 TABLET SUBLINGUAL EVERY 5 MIN PRN
Qty: 25 TABLET | Refills: 0 | Status: SHIPPED | OUTPATIENT
Start: 2024-12-10 | End: 2025-12-10

## 2024-12-11 NOTE — PROGRESS NOTES
3rd attempt made to reach patient and patient's spouse, Shea, for [facility] call. Left voicemail please call [phone] leave first name, last name, and  and I will return your call.

## 2024-12-13 ENCOUNTER — OFFICE VISIT (OUTPATIENT)
Dept: INTERNAL MEDICINE | Facility: CLINIC | Age: 69
End: 2024-12-13
Payer: MEDICARE

## 2024-12-13 VITALS
OXYGEN SATURATION: 99 % | HEIGHT: 73 IN | DIASTOLIC BLOOD PRESSURE: 56 MMHG | HEART RATE: 66 BPM | BODY MASS INDEX: 24.14 KG/M2 | WEIGHT: 182.13 LBS | SYSTOLIC BLOOD PRESSURE: 129 MMHG

## 2024-12-13 DIAGNOSIS — Z87.891 PERSONAL HISTORY OF NICOTINE DEPENDENCE: ICD-10-CM

## 2024-12-13 DIAGNOSIS — I95.89 OTHER SPECIFIED HYPOTENSION: ICD-10-CM

## 2024-12-13 DIAGNOSIS — I25.10 CORONARY ARTERY DISEASE INVOLVING NATIVE CORONARY ARTERY OF NATIVE HEART WITHOUT ANGINA PECTORIS: ICD-10-CM

## 2024-12-13 DIAGNOSIS — Z09 HOSPITAL DISCHARGE FOLLOW-UP: Primary | ICD-10-CM

## 2024-12-13 DIAGNOSIS — R07.89 OTHER CHEST PAIN: ICD-10-CM

## 2024-12-13 PROCEDURE — 99999 PR PBB SHADOW E&M-EST. PATIENT-LVL V: CPT | Mod: PBBFAC,HCNC,, | Performed by: STUDENT IN AN ORGANIZED HEALTH CARE EDUCATION/TRAINING PROGRAM

## 2024-12-13 NOTE — PATIENT INSTRUCTIONS
Continue monitoring your blood pressure at home. Continue not taking the Nifedipine and Metoprolol. Hold the Doxazosin, unless you BP runs over 140/90.     Follow up you your kidney doctor.     Scan of lungs to screen for lung cancer ordered.

## 2024-12-13 NOTE — PROGRESS NOTES
SUBJECTIVE     Chief Complaint   Patient presents with    Hospital Follow Up       HPI  Ibrahima Phelps is a 69 y.o. male with  HTN, HLD, CAD, pAFib, HFpEF, pulmonary HTN, ESRD on HD T/Th/S with anemia of chronic renal disease & secondary hyperparathyroidism, h/o failed kidney transplant, on immunosuppressants, polyneuropathy, benign essential tremor  that presents for a hospital discharge follow-up. LOV 8/29/24.    History of Present Illness    CHIEF COMPLAINT:  Patient presents today for a hospital discharge follow-up after experiencing chest pain.    RECENT HOSPITALIZATION:  He was hospitalized from December 6th to December 7th after presenting to the emergency room with chest pain. Hospital course was complicated by hypotension. During admission, chest XR, EKG, and echocardiogram were performed. He was seen by Dr. Vail on December 9th for hospital follow-up and denies experiencing chest pain since discharge.    CARDIOVASCULAR:  He has a known history of CAD and paroxysmal atrial fibrillation. His blood pressure has been low both at home and during hospitalization. He experiences blood pressure fluctuations at home, with readings typically around 120 but sometimes reaching up to 130. He takes doxazosin when his blood pressure exceeds 130. He is not taking nifedipine or metoprolol due to low blood pressure readings.    MEDICAL HISTORY:  He has a history of kidney transplant and is a former smoker.    MEDICATIONS:  He continues Eliquis 5 mg twice daily, Plavix 75 mg daily, atorvastatin 80 mg daily, and amiodarone for atrial fibrillation. Post kidney transplant medications include tacrolimus and prednisone.    RECENT PROCEDURES:  Attempted colonoscopy was not completed due to hypotension. He experienced blood pressure drop to 86/40, accompanied by flushing, excessive sweating, and brief loss of consciousness. Symptoms resolved with blood pressure normalization. The procedure was postponed pending further evaluation  before proceeding with anesthesia.    PREVENTIVE CARE:  He is due for repeat lung cancer screening CT, with last screening performed over one year ago.         Transitional Care Note    Family and/or Caretaker present at visit?  No.  Diagnostic tests reviewed/disposition: No diagnosic tests pending after this hospitalization.  Disease/illness education: Provided during appointment  Home health/community services discussion/referrals: Patient does not have home health established from hospital visit.  They do not need home health.  If needed, we will set up home health for the patient.   Establishment or re-establishment of referral orders for community resources: No other necessary community resources.   Discussion with other health care providers: No discussion with other health care providers necessary.           PAST MEDICAL HISTORY:  Past Medical History:   Diagnosis Date    Atrial fibrillation     CAD (coronary artery disease) 2006    MI in 2006    CHF (congestive heart failure) 2017    CKD (chronic kidney disease) stage 3, GFR 30-59 ml/min     Dr. Latif.  in transplanted kidney    CKD (chronic kidney disease) stage 5, GFR less than 15 ml/min 02/02/2024    COVID-19 02/07/2023    Diverticulosis     Hyperlipidemia     Hypertension     Keloid cicatrix     Metabolic bone disease     Other emphysema 10/01/2019    Pericarditis     S/P kidney transplant 1992    x2 (1992 & 2005),    Thrombocytopenia     Thyroid disease     Tobacco use 09/05/2014       PAST SURGICAL HISTORY:  Past Surgical History:   Procedure Laterality Date    AV FISTULA PLACEMENT Left 12/18/2023    Procedure: CREATION, AV FISTULA;  Surgeon: JUANI Melendez III, MD;  Location: [facility] OR [address] [place] [address];  Service: Vascular;  Laterality: Left;  LUE AVF creation vs AVG insertion    CARDIOVERSION  04/30/15    CHOLECYSTECTOMY      COLONOSCOPY  April 20, 2011    Diverticulosis, repeat recommended in 3 yrs., repeat colonoscopy 2014 revealed 2 polyps.  He should  "return in 5 years.    COLONOSCOPY N/A 3/13/2020    Procedure: COLONOSCOPY;  Surgeon: Chon Casper MD;  Location: [facility] (4TH FLR);  Service: Endoscopy;  Laterality: N/A;  ok to hold coumadin x5days-see telephone encounter 2/4/20-tb    COLONOSCOPY N/A 2/4/2021    Procedure: COLONOSCOPY;  Surgeon: Chon Casper MD;  Location: [facility] (4TH FLR);  Service: Endoscopy;  Laterality: N/A;  Eliquis - per Dr. GAVIN Blunt ok to hold x 2 days and "restarted asap"- ERW  last prep "poor", [id number] ordered/ Pt requests Dr. Casper  prep ins. mailed - COVID screening on 2/1/21 PCW- ERW    COLONOSCOPY N/A 3/3/2021    Procedure: COLONOSCOPY;  Surgeon: Chon Casper MD;  Location: [facility] (4TH FLR);  Service: Endoscopy;  Laterality: N/A;  Patient with his second poor bowel pre and has poor prep today.  If patient not intersted or can't get colonoscopy tomorrow will need constipation bowel prep and will need to restart his Eliquis today.Thanks,Chon     per Dr Blunt-ok to hold Eliquis 2 days prior      COVID     COLONOSCOPY N/A 12/6/2024    Procedure: COLONOSCOPY;  Surgeon: Chon Casper MD;  Location: [facility] (2ND FLR);  Service: Endoscopy;  Laterality: N/A;  Plavix-Eliquis pending/ HD T-Th-Sat- lab ordered  2/25/24 ECHO PA 49  ref by / [facility] inst portal-RN  11/29-message to clearance nurse for plavix and eliquis hold-tb  ok to hold Eliquis 2 days per Dr Parmar  ok to hold Plavix 5 days per MEENA SUTTON  12/2- p    CORONARY ANGIOGRAPHY N/A 2/28/2024    Procedure: ANGIOGRAM, CORONARY ARTERY;  Surgeon: Tylor Lincoln MD;  Location: [facility] CATH LAB;  Service: Cardiology;  Laterality: N/A;    CORONARY ANGIOPLASTY WITH STENT PLACEMENT  9/13/2006    FISTULOGRAM N/A 2/19/2024    Procedure: Fistulogram;  Surgeon: JUANI Melendez III, MD;  Location: [facility] CATH LAB;  Service: Peripheral Vascular;  Laterality: N/A;    FISTULOGRAM, WITH PTA Left 6/3/2024    Procedure: FISTULOGRAM, WITH PTA;  Surgeon: " JUANI Melendez III, MD;  [address]  Service: Vascular;  Laterality: Left;  mGy: 51.58  GyCm2: 6.8285  Fluoro time: 5.1 min  Contrast: 28 cc    IRRIGATION AND DEBRIDEMENT Right 8/30/2023    Procedure: IRRIGATION AND DEBRIDEMENT, RIGHT MIDDLE FINGER;  Surgeon: Martin Larsen MD;  [address]  Service: Orthopedics;  Laterality: Right;    KIDNEY TRANSPLANT  2005    LEFT HEART CATHETERIZATION N/A 2/26/2024    Procedure: Left heart cath;  Surgeon: Tylor Lincoln MD;  Location: [facility] CATH LAB;  Service: Cardiology;  Laterality: N/A;    PARATHYROIDECTOMY      PERCUTANEOUS TRANSLUMINAL ANGIOPLASTY OF ARTERIOVENOUS FISTULA Left 2/19/2024    Procedure: PTA, AV FISTULA;  Surgeon: JUANI Melendez III, MD;  Location: [facility] CATH LAB;  Service: Peripheral Vascular;  Laterality: Left;    STENT, DRUG ELUTING, SINGLE VESSEL, CORONARY N/A 2/28/2024    Procedure: Stent, Drug Eluting, Single Vessel, Coronary;  Surgeon: Tylor Lincoln MD;  Location: [facility] CATH LAB;  Service: Cardiology;  Laterality: N/A;    TREATMENT OF CARDIAC ARRHYTHMIA N/A 3/28/2022    Procedure: CARDIOVERSION;  Surgeon: Migue Blunt MD;  Location: [facility] EP LAB;  Service: Cardiology;  Laterality: N/A;  AF, DCC, MAC, GP, 3 PREP*RODRIGO deferred pt 100% compliant*    VENOPLASTY  6/3/2024    Procedure: ANGIOPLASTY, VEIN;  Surgeon: JUANI Melendez III, MD;  [facility];  Service: Vascular;;  Left subclavian       FAMILY HISTORY:  Family History   Problem Relation Name Age of Onset    No Known Problems Sister      No Known Problems Brother      Thyroid disease Mother          s/p surgery    Heart disease Father          had pacemaker    No Known Problems Sister      Kidney failure Sister      Kidney disease Sister      No Known Problems Sister      Kidney disease Brother      Kidney failure Brother          s/p transplant    Diabetes Mellitus Neg Hx      Stroke Neg Hx      Heart attack Neg Hx      Cancer Neg Hx       Celiac disease Neg Hx      Cirrhosis Neg Hx      Colon cancer Neg Hx      Esophageal cancer Neg Hx      Inflammatory bowel disease Neg Hx      Rectal cancer Neg Hx      Stomach cancer Neg Hx      Ulcerative colitis Neg Hx      Liver disease Neg Hx      Liver cancer Neg Hx      Crohn's disease Neg Hx      Melanoma Neg Hx         ALLERGIES AND MEDICATIONS: updated and reviewed.  Review of patient's allergies indicates:   Allergen Reactions    Ace inhibitors Swelling    Verapamil Other (See Comments)     Other reaction(s): Unknown    Povidone-iodine Itching     Current Outpatient Medications   Medication Sig Dispense Refill    acetaminophen (TYLENOL) 500 MG tablet Take 1 tablet (500 mg total) by mouth every 6 (six) hours as needed for Pain. 20 tablet 0    albuterol (VENTOLIN HFA) 90 mcg/actuation inhaler Inhale 2 puffs into the lungs every 6 (six) hours as needed for Wheezing or Shortness of Breath. Rescue 18 g 3    albuterol-ipratropium (DUO-NEB) 2.5 mg-0.5 mg/3 mL nebulizer solution Take 3 mLs by nebulization every 4 (four) hours as needed for Wheezing. Rescue 75 mL 0    amiodarone (PACERONE) 200 MG Tab TAKE 1 TABLET BY MOUTH EVERY DAY 90 tablet 3    atorvastatin (LIPITOR) 80 MG tablet Take 1 tablet (80 mg total) by mouth once daily. 90 tablet 3    b complex vitamins (B COMPLEX-VITAMIN B12) tablet Take 1 tablet by mouth once daily. 90 tablet 3    calcium carbonate (CALCIUM 600 ORAL) Take 1 tablet by mouth 2 (two) times a day.      CHOLECALCIFEROL, VITAMIN D3, ORAL Take 2 tablets by mouth 2 (two) times daily.      clopidogreL (PLAVIX) 75 mg tablet Take 1 tablet (75 mg total) by mouth once daily. 90 tablet 3    doxazosin (CARDURA) 4 MG tablet Take 1 tablet (4 mg total) by mouth every 12 (twelve) hours. 180 tablet 3    ELIQUIS 5 mg Tab TAKE 1 TABLET BY MOUTH TWICE A DAY 60 tablet 32    famotidine (PEPCID) 20 MG tablet TAKE 1 TABLET (20 MG TOTAL) BY MOUTH ONCE DAILY. ON DIALYSIS DAYS, PLEASE TAKE THIS MEDICINE AFTER  DIALYSIS. 90 tablet 1    fexofenadine HCl (ALLEGRA ORAL) Take 1 tablet by mouth daily as needed (allergies).      fluticasone furoate-vilanteroL (BREO) 100-25 mcg/dose diskus inhaler Inhale 1 puff into the lungs once daily. Controller. Use this inhaler every day.      fluticasone propionate (FLONASE) 50 mcg/actuation nasal spray 1 spray (50 mcg total) by Each Nostril route daily as needed for Allergies.      metoprolol succinate (TOPROL-XL) 25 MG 24 hr tablet Take 0.5 tablets (12.5 mg total) by mouth once daily. 45 tablet 3    multivit-min-FA-lycopen-lutein (CENTRUM SILVER) 0.4 mg-300 mcg- 250 mcg Tab Take 1 tablet by mouth once daily.      multivitamin (THERAGRAN) per tablet Take 1 tablet by mouth Daily.      NIFEdipine (PROCARDIA-XL) 30 MG (OSM) 24 hr tablet Take 1 tablet (30 mg total) by mouth 2 (two) times a day. 180 tablet 3    nitroGLYCERIN (NITROSTAT) 0.4 MG SL tablet Place 1 tablet (0.4 mg total) under the tongue every 5 (five) minutes as needed for Chest pain. 25 tablet 0    predniSONE (DELTASONE) 5 MG tablet Take 1 tablet (5 mg total) by mouth once daily. 90 tablet 3    pulse oximeter (PULSE OXIMETER) device by Apply Externally route 2 (two) times a day. Use twice daily at 8 AM and 3 PM and [facility]. 1 each 0    traMADoL (ULTRAM) 50 mg tablet Take 1 tablet (50 mg total) by mouth every 6 (six) hours as needed for Pain. 20 tablet 0    gabapentin (NEURONTIN) 100 MG capsule Take 1 capsule (100 mg total) by mouth as needed (pain).      omeprazole (PRILOSEC) 20 MG capsule Take 1 capsule (20 mg total) by mouth daily as needed (heartburn).      tacrolimus (PROGRAF) 1 MG Cap Take 3 capsules (3 mg total) by mouth every 12 (twelve) hours for 60 days, THEN 2 capsules (2 mg total) every 12 (twelve) hours for 60 days, THEN 1 capsule (1 mg total) every 12 (twelve) hours for 60 days, THEN 1 capsule (1 mg total) once daily. 540 capsule 4     No current facility-administered medications for  "this visit.       ROS  Review of Systems   Constitutional:  Negative for activity change, chills and fever.   HENT:  Negative for congestion and hearing loss.    Eyes:  Negative for pain and visual disturbance.   Respiratory:  Negative for cough and shortness of breath.    Cardiovascular:  Negative for chest pain and palpitations.   Gastrointestinal:  Negative for abdominal pain, constipation, diarrhea, nausea and vomiting.   Endocrine: Negative.    Genitourinary: Negative.    Musculoskeletal:  Negative for arthralgias and myalgias.   Skin: Negative.    Allergic/Immunologic: Negative.    Neurological:  Negative for dizziness, light-headedness and headaches.   Hematological: Negative.          OBJECTIVE     Physical Exam  Vitals:    12/13/24 1003   BP: (!) 129/56   Pulse: 66    Body mass index is 24.03 kg/m².  Weight: 82.6 kg (182 lb 1.6 oz)   Height: 6' 1" (185.4 cm)     Physical Exam  Vitals reviewed.   Constitutional:       General: He is not in acute distress.     Appearance: Normal appearance.   HENT:      Head: Normocephalic and atraumatic.      Mouth/Throat:      Mouth: Mucous membranes are moist.      Pharynx: Oropharynx is clear.   Eyes:      Extraocular Movements: Extraocular movements intact.      Conjunctiva/sclera: Conjunctivae normal.      Pupils: Pupils are equal, round, and reactive to light.   Cardiovascular:      Rate and Rhythm: Normal rate and regular rhythm.      Pulses: Normal pulses.      Heart sounds: Normal heart sounds.   Pulmonary:      Effort: Pulmonary effort is normal.      Breath sounds: Normal breath sounds.   Abdominal:      General: There is no distension.   Musculoskeletal:         General: Normal range of motion.      Cervical back: Normal range of motion and neck supple.   Skin:     General: Skin is warm and dry.   Neurological:      General: No focal deficit present.      Mental Status: He is alert.           ASSESSMENT     69 y.o. male with     1. Hospital discharge follow-up  "   2. Chest pain in patient with CAD    3. Other specified hypotension    4. Personal history of nicotine dependence    5. Coronary artery disease involving native coronary artery of native heart without angina pectoris        PLAN:     1. Hospital discharge follow-up  - Discharge summary reviewed, discharge medication reconciliation reviewed with patient in clinic today.    2. Chest pain in patient with CAD  - Resolved. Continue follow up with Cardiology.     3. Other specified hypotension  - Continue holding Nifedipine, Metoprolol. Can take Doxazosin if BP > 140/90.   - Follow up with Nephrology.     4. Personal history of nicotine dependence  - Due for LDCT. Patient without symptoms of lung cancer. Shared decision making discussed, and he requests completing.   - CT Chest Lung Screening Low Dose; Future    5. Coronary artery disease involving native coronary artery of native heart without angina pectoris  - Stable. Continue current regimen. Continue follow up with Cardiology.       RTC PRN       Anatoliy Colindres MD  Family Medicine  [facility]  12/13/2024    This note was generated with the assistance of ambient listening technology. Verbal consent was obtained by the patient and accompanying visitor(s) for the recording of patient appointment to facilitate this note. I attest to having reviewed and edited the generated note for accuracy, though some syntax or spelling errors may persist. Please contact the author of this note for any clarification.      Follow up if symptoms worsen or fail to improve.

## 2024-12-17 RX ORDER — GABAPENTIN 300 MG/1
300 CAPSULE ORAL NIGHTLY
Qty: 90 CAPSULE | Refills: 3
Start: 2024-12-17 | End: 2024-12-18 | Stop reason: SDUPTHER

## 2024-12-18 ENCOUNTER — HOSPITAL ENCOUNTER (OUTPATIENT)
Dept: RADIOLOGY | Facility: HOSPITAL | Age: 69
Discharge: HOME | End: 2024-12-18
Attending: STUDENT IN AN ORGANIZED HEALTH CARE EDUCATION/TRAINING PROGRAM
Payer: MEDICARE

## 2024-12-18 DIAGNOSIS — Z87.891 PERSONAL HISTORY OF NICOTINE DEPENDENCE: ICD-10-CM

## 2024-12-18 PROCEDURE — 71271 CT THORAX LUNG CANCER SCR C-: CPT | Mod: TC,HCNC

## 2024-12-18 PROCEDURE — 71271 CT THORAX LUNG CANCER SCR C-: CPT | Mod: 26,HCNC,, | Performed by: STUDENT IN AN ORGANIZED HEALTH CARE EDUCATION/TRAINING PROGRAM

## 2024-12-18 RX ORDER — GABAPENTIN 300 MG/1
300 CAPSULE ORAL NIGHTLY
Qty: 90 CAPSULE | Refills: 3
Start: 2024-12-18 | End: 2025-12-18

## 2024-12-23 RX ORDER — GABAPENTIN 300 MG/1
300 CAPSULE ORAL NIGHTLY
Qty: 90 CAPSULE | Refills: 3 | Status: SHIPPED | OUTPATIENT
Start: 2024-12-23 | End: 2025-12-23

## 2024-12-23 RX ORDER — GABAPENTIN 300 MG/1
300 CAPSULE ORAL NIGHTLY
Start: 2024-12-23 | End: 2024-12-23 | Stop reason: SDUPTHER

## 2025-01-12 ENCOUNTER — PATIENT MESSAGE (OUTPATIENT)
Dept: INTERNAL MEDICINE | Facility: CLINIC | Age: 70
End: 2025-01-12
Payer: MEDICARE

## 2025-01-12 DIAGNOSIS — U07.1 COVID-19: Primary | ICD-10-CM

## 2025-01-13 NOTE — TELEPHONE ENCOUNTER
[facility] and dx with covid. Pt was prescribed Lageviro however he needs a PA done. UC said they do not do that so pt needs to call PCP. Will you need to order this medication in order for a PA to be done since this was outside of [facility]?     LOV with Anatoliy Colindres MD , 12/13/2024

## 2025-01-17 NOTE — Clinical Note
Managing Symptoms of Upper Respiratory Infections (URI) for Adults  You can expect the symptoms of your cold or upper respiratory infection to last 14 to 21 days.  A dry hacking cough may continue up to three or four weeks. To help you recover:  **Drink more fluids.  **Get enough rest.  **Use a humidifier or increase time in a steamy shower.    Keep in mind that green or yellow secretion do not equal bacterial infection.    Additional recommendations for managing your symptoms:  Fever, headache, or pain  #? Acetaminophen (Tylenol™) 500 mg, 2 tablets every 8 hours as needed for the first 5-7 days of infection.   Maximum dose: 3000 mg of acetaminophen in 24 hours.   Avoid combination products that contain acetaminophen (read the label) while taking scheduled  acetaminophen.   Use lowest effective dose for the shortest possible duration to reduce the risk of serious adverse effects.    #? Ibuprofen (Advil™, Motrin™) 200 mg, 3 tablets every 6 hours-8 hours.   Avoid ibuprofen if you have kidney disease, coronary heart disease, heart failure, or history of a gastric  ulcer or gastric surgery   Maximum dose: 2400 mg of ibuprofen in 24 hours.   Use lowest needed dose for the shortest possible time frame to reduce the risk of serious side effects.  Do not use longer than 7 days, unless directed by your health care provider.   Take with food to prevent getting an upset stomach.    Sore throat  #? Take acetaminophen and/or ibuprofen as above.  #? Use throat lozenges with benzocaine which help numb your sore throat (Cepacol®, chloraseptic brands).  #? Gargle with saltwater several times a day to help relieve throat pain. Mix 1/4 teaspoon (1.4 grams) of table salt  in 8 ounces (237 milliliters) of warm water. Gargle the solution and then spit it out.    Sinus drainage, sinus/nose/ear congestion  (nose drainage, drainage in the back of the throat, sinus pressure, facial pain, nose stuffiness, ear pressure)  It is common to have  The catheter was repositioned into the ostium   right coronary artery. An angiography was performed of the right coronary arteries. Multiple views were taken. nasal drainage of various colors with a viral cold or upper respiratory infection. These usually get  better with time and do not require antibiotics.  #? Saline sinus rinse - Mix and use according to directions on the product (NeilMed©, XClear©).  #? Nasal spray (Flonase®, Nasacort®) - 2 sprays per nostril once a day after a saline sinus irrigation.  #? Sudafed (pseudoephedrine) capsules - Take every 4 to 6 hours per package instructions for sinus congestion.  Available behind the pharmacy counter.   Avoid if you have high blood pressure, heart disease or take beta blockers (atenolol, metoprolol, etc).   Do not exceed 240 mg per day.   Longer acting medications may have more side effects such restlessness and insomnia.    Cough  Avoid coughing too hard or too often. Excessive coughing may cause bronchial (tubes going to the lungs) irritation which  could cause a cough-irritate-cough cycle that can prolong the cough for weeks.  #? Honey - 1 to 2 teaspoons every 4 to 6 hours as needed.  #? Cough drops every 4 to 6 hours as needed.  #? Guaifenesin/dextromethorphan syrup - (Robitussin DM®) per package instructions. (Do not take if you are taking  an antidepressant, opioid pain medication, sleeping medication, or antipsychotic medication.)    Please make a follow up appointment and return to clinic if your symptoms fail to improve or if they worsen so that we can further examine you.    Please present to the emergency room if you develop chest pain, shortness of breath or any other unusual symptoms.

## 2025-01-30 DIAGNOSIS — Z00.00 ENCOUNTER FOR MEDICARE ANNUAL WELLNESS EXAM: ICD-10-CM

## 2025-02-14 RX ORDER — TORSEMIDE 20 MG/1
40 TABLET ORAL
Qty: 90 TABLET | Refills: 3 | Status: SHIPPED | OUTPATIENT
Start: 2025-02-14 | End: 2026-04-10

## 2025-02-21 ENCOUNTER — OFFICE VISIT (OUTPATIENT)
Dept: VASCULAR SURGERY | Facility: CLINIC | Age: 70
End: 2025-02-21
Attending: SURGERY
Payer: MEDICARE

## 2025-02-21 ENCOUNTER — HOSPITAL ENCOUNTER (OUTPATIENT)
Dept: VASCULAR SURGERY | Facility: CLINIC | Age: 70
Discharge: HOME | End: 2025-02-21
Attending: SURGERY
Payer: MEDICARE

## 2025-02-21 VITALS
BODY MASS INDEX: 24.54 KG/M2 | HEIGHT: 73 IN | RESPIRATION RATE: 18 BRPM | SYSTOLIC BLOOD PRESSURE: 127 MMHG | TEMPERATURE: 98 F | HEART RATE: 70 BPM | WEIGHT: 185.19 LBS | DIASTOLIC BLOOD PRESSURE: 62 MMHG

## 2025-02-21 DIAGNOSIS — Z99.2 ESRD (END STAGE RENAL DISEASE) ON DIALYSIS: ICD-10-CM

## 2025-02-21 DIAGNOSIS — N18.6 ESRD (END STAGE RENAL DISEASE) ON DIALYSIS: ICD-10-CM

## 2025-02-21 DIAGNOSIS — Z99.2 ESRD (END STAGE RENAL DISEASE) ON DIALYSIS: Primary | ICD-10-CM

## 2025-02-21 DIAGNOSIS — T82.858D AV FISTULA STENOSIS, SUBSEQUENT ENCOUNTER: Primary | ICD-10-CM

## 2025-02-21 DIAGNOSIS — T82.858D STENOSIS OF ARTERIOVENOUS DIALYSIS FISTULA, SUBSEQUENT ENCOUNTER: ICD-10-CM

## 2025-02-21 DIAGNOSIS — N18.6 ESRD (END STAGE RENAL DISEASE) ON DIALYSIS: Primary | ICD-10-CM

## 2025-02-21 DIAGNOSIS — Z01.818 PRE-OP EVALUATION: ICD-10-CM

## 2025-02-21 PROCEDURE — 93990 DOPPLER FLOW TESTING: CPT | Mod: HCNC,S$GLB,, | Performed by: SURGERY

## 2025-02-21 NOTE — PROGRESS NOTES
VASCULAR SURGERY SERVICE    REFERRING DOCTOR: Emre Latif MD    CHIEF COMPLAINT: CKD 5    HISTORY OF PRESENT ILLNESS: Ibrahima Phelps is a 69 y.o. male status post 2 kidney transplantations the last in 2005, now with the client renal function with a GFR of 9.7 not yet initiated hemodialysis.  He is right-handed.  He would a Veronica AV fistula in the left many many years ago.    S/p    PTA, left AV fistula 6/3/2024, 02/19/2024  left brachiocephalic AV fistula creation 12/18/2023    He is taking Eliquis for atrial fibrillation.    He is no history of AICD or pacemaker placement    02/06/2024:  This is initial follow-up visit status post left brachiocephalic AV fistula creation 12/18/2023.  He has not initiated hemodialysis.  Denies any hand pain weakness or numbness.  This fistula was felt likely that he had a outflow vein stenosis in the cephalic vein    03/22/2024:  This is postoperative appointment after angioplasty of his left AV fistula 1 month ago.  He subsequently initiated hemodialysis proximally 3 weeks ago.  He would 1 episode of infiltration last week but none since    05/24/2024:  Now presents for follow-up.  Since then notes that his hemodialysis has been working through his fistula without any sort of issues.  No more issues of infiltration, heavy bleeding.  Notes that the hematoma that was present prior in March as still been there, largely unchanged.    06/07/2024:  He now returns after recent fistulogram.  This was prompted by a large mid pseudoaneurysm.  However when he presented with a fistulogram the very pulsatile pseudoaneurysms was largely nonpulsatile.  The fistulogram revealed only trace filling of the pseudoaneurysms and thus I did not place a covered stent.  However there were 2-3 areas of outflow stenosis of the cephalic arch which I treated.    He has having no problems using the fistula    10/11/2024:  This is a four-month follow-up.  He is not having any troubles using the  "fistula.  He is on Eliquis 5 mg p.o. b.i.d. for AFib and Plavix for PCI done earlier this year.  Not surprisingly it takes a little longer for his needle sticks to stop bleeding, but this is been stable for the last 3-4 months    02/21/2025:  Now returns with low flows      Past Medical History:   Diagnosis Date    Atrial fibrillation     CAD (coronary artery disease) 2006    MI in 2006    CHF (congestive heart failure) 2017    CKD (chronic kidney disease) stage 3, GFR 30-59 ml/min     Dr. Latif.  in transplanted kidney    CKD (chronic kidney disease) stage 5, GFR less than 15 ml/min 02/02/2024    COVID-19 02/07/2023    Diverticulosis     Hyperlipidemia     Hypertension     Keloid cicatrix     Metabolic bone disease     Other emphysema 10/01/2019    Pericarditis     S/P kidney transplant 1992    x2 (1992 & 2005),    Thrombocytopenia     Thyroid disease     Tobacco use 09/05/2014       Past Surgical History:   Procedure Laterality Date    AV FISTULA PLACEMENT Left 12/18/2023    Procedure: CREATION, AV FISTULA;  Surgeon: JUANI Melendez III, MD;  [facility];  Service: Vascular;  Laterality: Left;  LUE AVF creation vs AVG insertion    CARDIOVERSION  04/30/15    CHOLECYSTECTOMY      COLONOSCOPY  April 20, 2011    Diverticulosis, repeat recommended in 3 yrs., repeat colonoscopy 2014 revealed 2 polyps.  He should return in 5 years.    COLONOSCOPY N/A 3/13/2020    Procedure: COLONOSCOPY;  Surgeon: Chon Casper MD;  [facility]  Service: Endoscopy;  Laterality: N/A;  ok to hold coumadin x5days-see telephone encounter 2/4/20-tb    COLONOSCOPY N/A 2/4/2021    Procedure: COLONOSCOPY;  Surgeon: Chon Casper MD;  Location: [facility] (4TH FLR);  Service: Endoscopy;  Laterality: N/A;  Eliquis - per Dr. GAVIN Blunt ok to hold x 2 days and "restarted asap"- ERW  last prep "poor", [id number] ordered/ Pt requests Dr. Casper  prep ins. mailed - COVID screening on 2/1/21 PCW- ERW    " COLONOSCOPY N/A 3/3/2021    Procedure: COLONOSCOPY;  Surgeon: Chon Casper MD;  Location: [facility] ENDO (4TH FLR);  Service: Endoscopy;  Laterality: N/A;  Patient with his second poor bowel pre and has poor prep today.  If patient not intersted or can't get colonoscopy tomorrow will need constipation bowel prep and will need to restart his Eliquis today.Thanks,Chon     per Dr Lopez to hold Eliquis 2 days prior      COVID     COLONOSCOPY N/A 12/6/2024    Procedure: COLONOSCOPY;  Surgeon: Chon Casper MD;  Location: [facility] ENDO (2ND FLR);  Service: Endoscopy;  Laterality: N/A;  Plavix-Eliquis pending/ HD T-Th-Sat- lab ordered  2/25/24 ECHO PA 49  ref by / [facility] portal-RN  11/29-message to clearance nurse for plavix and eliquis hold-padmini  ok to hold Eliquis 2 days per Dr Agata cuha to hold Plavix 5 days per MEENA COLVINGT  12/2- p    CORONARY ANGIOGRAPHY N/A 2/28/2024    Procedure: ANGIOGRAM, CORONARY ARTERY;  Surgeon: Tylor Lincoln MD;  Location: [facility] CATH LAB;  Service: Cardiology;  Laterality: N/A;    CORONARY ANGIOPLASTY WITH STENT PLACEMENT  9/13/2006    FISTULOGRAM N/A 2/19/2024    Procedure: Fistulogram;  Surgeon: JUANI Melendez III, MD;  Location: [facility] CATH LAB;  Service: Peripheral Vascular;  Laterality: N/A;    FISTULOGRAM, WITH PTA Left 6/3/2024    Procedure: FISTULOGRAM, WITH PTA;  Surgeon: JUANI Melendez III, MD;  [facility];  Service: Vascular;  Laterality: Left;  mGy: 51.58  GyCm2: 6.8285  Fluoro time: 5.1 min  Contrast: 28 cc    IRRIGATION AND DEBRIDEMENT Right 8/30/2023    Procedure: IRRIGATION AND DEBRIDEMENT, RIGHT MIDDLE FINGER;  Surgeon: Martin Larsen MD;  [facility];  Service: Orthopedics;  Laterality: Right;    KIDNEY TRANSPLANT  2005    LEFT HEART CATHETERIZATION N/A 2/26/2024    Procedure: Left heart cath;  Surgeon: Tylor Lincoln MD;  Location: [facility] CATH LAB;  Service: Cardiology;  Laterality: N/A;     PARATHYROIDECTOMY      PERCUTANEOUS TRANSLUMINAL ANGIOPLASTY OF ARTERIOVENOUS FISTULA Left 2/19/2024    Procedure: PTA, AV FISTULA;  Surgeon: JUANI Melendez III, MD;  Location: [facility] CATH LAB;  Service: Peripheral Vascular;  Laterality: Left;    STENT, DRUG ELUTING, SINGLE VESSEL, CORONARY N/A 2/28/2024    Procedure: Stent, Drug Eluting, Single Vessel, Coronary;  Surgeon: Tylor Lincoln MD;  Location: [facility] CATH LAB;  Service: Cardiology;  Laterality: N/A;    TREATMENT OF CARDIAC ARRHYTHMIA N/A 3/28/2022    Procedure: CARDIOVERSION;  Surgeon: Migue Blunt MD;  Location: [facility] EP LAB;  Service: Cardiology;  Laterality: N/A;  AF, DCC, MAC, GP, 3 PREP*RODRIGO deferred pt 100% compliant*    VENOPLASTY  6/3/2024    Procedure: ANGIOPLASTY, VEIN;  Surgeon: JUANI Melendez III, MD;  Location: [facility] OR [address] [place] [address];  Service: Vascular;;  Left subclavian         Current Outpatient Medications:     acetaminophen (TYLENOL) 500 MG tablet, Take 1 tablet (500 mg total) by mouth every 6 (six) hours as needed for Pain., Disp: 20 tablet, Rfl: 0    albuterol (VENTOLIN HFA) 90 mcg/actuation inhaler, Inhale 2 puffs into the lungs every 6 (six) hours as needed for Wheezing or Shortness of Breath. Rescue, Disp: 18 g, Rfl: 3    albuterol-ipratropium (DUO-NEB) 2.5 mg-0.5 mg/3 mL nebulizer solution, Take 3 mLs by nebulization every 4 (four) hours as needed for Wheezing. Rescue, Disp: 75 mL, Rfl: 0    amiodarone (PACERONE) 200 MG Tab, TAKE 1 TABLET BY MOUTH EVERY DAY, Disp: 90 tablet, Rfl: 3    atorvastatin (LIPITOR) 80 MG tablet, Take 1 tablet (80 mg total) by mouth once daily., Disp: 90 tablet, Rfl: 3    b complex vitamins (B COMPLEX-VITAMIN B12) tablet, Take 1 tablet by mouth once daily., Disp: 90 tablet, Rfl: 3    CHOLECALCIFEROL, VITAMIN D3, ORAL, Take 2 tablets by mouth 2 (two) times daily., Disp: , Rfl:     clopidogreL (PLAVIX) 75 mg tablet, Take 1 tablet (75 mg total) by mouth once daily., Disp: 90 tablet, Rfl: 3    doxazosin (CARDURA) 4  MG tablet, Take 1 tablet (4 mg total) by mouth every 12 (twelve) hours., Disp: 180 tablet, Rfl: 3    ELIQUIS 5 mg Tab, TAKE 1 TABLET BY MOUTH TWICE A DAY, Disp: 60 tablet, Rfl: 32    famotidine (PEPCID) 20 MG tablet, TAKE 1 TABLET (20 MG TOTAL) BY MOUTH ONCE DAILY. ON DIALYSIS DAYS, PLEASE TAKE THIS MEDICINE AFTER DIALYSIS., Disp: 90 tablet, Rfl: 1    fexofenadine HCl (ALLEGRA ORAL), Take 1 tablet by mouth daily as needed (allergies)., Disp: , Rfl:     fluticasone propionate (FLONASE) 50 mcg/actuation nasal spray, 1 spray (50 mcg total) by Each Nostril route daily as needed for Allergies., Disp: , Rfl:     gabapentin (NEURONTIN) 300 MG capsule, Take 1 capsule (300 mg total) by mouth every evening., Disp: 90 capsule, Rfl: 3    metoprolol succinate (TOPROL-XL) 25 MG 24 hr tablet, Take 0.5 tablets (12.5 mg total) by mouth once daily., Disp: 45 tablet, Rfl: 3    multivit-min-FA-lycopen-lutein (CENTRUM SILVER) 0.4 mg-300 mcg- 250 mcg Tab, Take 1 tablet by mouth once daily., Disp: , Rfl:     NIFEdipine (PROCARDIA-XL) 30 MG (OSM) 24 hr tablet, Take 1 tablet (30 mg total) by mouth 2 (two) times a day., Disp: 180 tablet, Rfl: 3    omeprazole (PRILOSEC) 20 MG capsule, Take 1 capsule (20 mg total) by mouth daily as needed (heartburn)., Disp: , Rfl:     pulse oximeter (PULSE OXIMETER) device, by Apply Externally route 2 (two) times a day. Use twice daily at 8 AM and 3 PM and [facility]., Disp: 1 each, Rfl: 0    torsemide (DEMADEX) 20 MG Tab, Take 2 tablets (40 mg total) by mouth 3 (three) times a week. (Patient taking differently: Take 40 mg by mouth 3 (three) times a week. Pt reports taking 40 mg BID), Disp: 90 tablet, Rfl: 3    calcium carbonate (CALCIUM 600 ORAL), Take 1 tablet by mouth 2 (two) times a day. (Patient not taking: Reported on 2/21/2025), Disp: , Rfl:     fluticasone furoate-vilanteroL (BREO) 100-25 mcg/dose diskus inhaler, Inhale 1 puff into the lungs once daily. Controller. Use  this inhaler every day. (Patient not taking: Reported on 2/21/2025), Disp: , Rfl:     multivitamin (THERAGRAN) per tablet, Take 1 tablet by mouth Daily. (Patient not taking: Reported on 2/21/2025), Disp: , Rfl:     nitroGLYCERIN (NITROSTAT) 0.4 MG SL tablet, Place 1 tablet (0.4 mg total) under the tongue every 5 (five) minutes as needed for Chest pain. (Patient not taking: Reported on 2/21/2025), Disp: 25 tablet, Rfl: 0    predniSONE (DELTASONE) 5 MG tablet, Take 1 tablet (5 mg total) by mouth once daily. (Patient not taking: Reported on 2/21/2025), Disp: 90 tablet, Rfl: 3    tacrolimus (PROGRAF) 1 MG Cap, Take 3 capsules (3 mg total) by mouth every 12 (twelve) hours for 60 days, THEN 2 capsules (2 mg total) every 12 (twelve) hours for 60 days, THEN 1 capsule (1 mg total) every 12 (twelve) hours for 60 days, THEN 1 capsule (1 mg total) once daily., Disp: 540 capsule, Rfl: 4    traMADoL (ULTRAM) 50 mg tablet, Take 1 tablet (50 mg total) by mouth every 6 (six) hours as needed for Pain. (Patient not taking: Reported on 2/21/2025), Disp: 20 tablet, Rfl: 0    Review of patient's allergies indicates:   Allergen Reactions    Ace inhibitors Swelling    Verapamil Other (See Comments)     Other reaction(s): Unknown    Povidone-iodine Itching       Family History   Problem Relation Name Age of Onset    No Known Problems Sister      No Known Problems Brother      Thyroid disease Mother          s/p surgery    Heart disease Father          had pacemaker    No Known Problems Sister      Kidney failure Sister      Kidney disease Sister      No Known Problems Sister      Kidney disease Brother      Kidney failure Brother          s/p transplant    Diabetes Mellitus Neg Hx      Stroke Neg Hx      Heart attack Neg Hx      Cancer Neg Hx      Celiac disease Neg Hx      Cirrhosis Neg Hx      Colon cancer Neg Hx      Esophageal cancer Neg Hx      Inflammatory bowel disease Neg Hx      Rectal cancer Neg Hx      Stomach cancer Neg Hx       "Ulcerative colitis Neg Hx      Liver disease Neg Hx      Liver cancer Neg Hx      Crohn's disease Neg Hx      Melanoma Neg Hx         Social History     Tobacco Use    Smoking status: Every Day     Current packs/day: 0.00     Average packs/day: 0.5 packs/day for 40.0 years (20.0 ttl pk-yrs)     Types: Cigarettes     Start date: 1982     Last attempt to quit: 2022     Years since quittin.9    Smokeless tobacco: Never    Tobacco comments:     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     Patient is currently retired   Substance Use Topics    Alcohol use: No    Drug use: No       PHYSICAL EXAM:   /62 (BP Location: Right arm, Patient Position: Sitting)   Pulse 70   Temp 97.6 °F (36.4 °C) (Oral)   Resp 18   Ht 6' 1" (1.854 m)   Wt 84 kg (185 lb 3 oz)   BMI 24.43 kg/m²   Constitutional:  Alert,   Well-appearing  In no distress.   Neurological: Normal speech  no focal findings  CN II - XII grossly intact.    Psychiatric: Mood and affect appropriate and symmetric.   HEENT: Normocephalic / atraumatic  PERRLA  Midline trachea  No scars across the neck   Cardiac: Regular rate and rhythm.   Pulmonary: Normal pulmonary effort.   Abdomen: Soft, not distended.     Skin: Warm and well perfused.    Vascular:  Left radial pulses nonpalpable   Extremities/  Musculoskeletal: Left arm shows a very pulsatile AV fistula which finishing since 2 a modest thrill proximally 2 cm from the arterial anastomosis, the becomes a bit aneurysmal     2024    IMAGING:  Duplex of the fistula shows a new para anastomotic stenosis with a velocity of 536, moderate outflow stenosis with a velocity of 381, flow volume markedly reduced to 536 (prior 2.8)    Prior fistulogram was personally reviewed      IMPRESSION:  New high-grade para anastomotic stenosis, moderate outflow stenosis.  The most pressing of these of the para anastomotic stenosis.  Intervention is warranted.  He is not a " transradial candidate due to lack of a radial pulse    PLAN:  Antegrade left fistulogram and intervention Wednesday March 5, 2025  Hybrid OR 11 case    I have explained the risks, benefits and alternatives for this procedure in detail.  The patient voices understanding and all questions have be answered, and agrees to proceed with the procedure.     INDIGO Melendez III, MD, FACS  Professor and Chief, Vascular and Endovascular Surgery

## 2025-02-21 NOTE — H&P (VIEW-ONLY)
VASCULAR SURGERY SERVICE    REFERRING DOCTOR: Emre Latif MD    CHIEF COMPLAINT: CKD 5    HISTORY OF PRESENT ILLNESS: Ibrahima Phelps is a 69 y.o. male status post 2 kidney transplantations the last in 2005, now with the client renal function with a GFR of 9.7 not yet initiated hemodialysis.  He is right-handed.  He would a Veronica AV fistula in the left many many years ago.    S/p    PTA, left AV fistula 6/3/2024, 02/19/2024  left brachiocephalic AV fistula creation 12/18/2023    He is taking Eliquis for atrial fibrillation.    He is no history of AICD or pacemaker placement    02/06/2024:  This is initial follow-up visit status post left brachiocephalic AV fistula creation 12/18/2023.  He has not initiated hemodialysis.  Denies any hand pain weakness or numbness.  This fistula was felt likely that he had a outflow vein stenosis in the cephalic vein    03/22/2024:  This is postoperative appointment after angioplasty of his left AV fistula 1 month ago.  He subsequently initiated hemodialysis proximally 3 weeks ago.  He would 1 episode of infiltration last week but none since    05/24/2024:  Now presents for follow-up.  Since then notes that his hemodialysis has been working through his fistula without any sort of issues.  No more issues of infiltration, heavy bleeding.  Notes that the hematoma that was present prior in March as still been there, largely unchanged.    06/07/2024:  He now returns after recent fistulogram.  This was prompted by a large mid pseudoaneurysm.  However when he presented with a fistulogram the very pulsatile pseudoaneurysms was largely nonpulsatile.  The fistulogram revealed only trace filling of the pseudoaneurysms and thus I did not place a covered stent.  However there were 2-3 areas of outflow stenosis of the cephalic arch which I treated.    He has having no problems using the fistula    10/11/2024:  This is a four-month follow-up.  He is not having any troubles using the  "fistula.  He is on Eliquis 5 mg p.o. b.i.d. for AFib and Plavix for PCI done earlier this year.  Not surprisingly it takes a little longer for his needle sticks to stop bleeding, but this is been stable for the last 3-4 months    02/21/2025:  Now returns with low flows      Past Medical History:   Diagnosis Date    Atrial fibrillation     CAD (coronary artery disease) 2006    MI in 2006    CHF (congestive heart failure) 2017    CKD (chronic kidney disease) stage 3, GFR 30-59 ml/min     Dr. Latif.  in transplanted kidney    CKD (chronic kidney disease) stage 5, GFR less than 15 ml/min 02/02/2024    COVID-19 02/07/2023    Diverticulosis     Hyperlipidemia     Hypertension     Keloid cicatrix     Metabolic bone disease     Other emphysema 10/01/2019    Pericarditis     S/P kidney transplant 1992    x2 (1992 & 2005),    Thrombocytopenia     Thyroid disease     Tobacco use 09/05/2014       Past Surgical History:   Procedure Laterality Date    AV FISTULA PLACEMENT Left 12/18/2023    Procedure: CREATION, AV FISTULA;  Surgeon: JUANI Melendez III, MD;  [facility];  Service: Vascular;  Laterality: Left;  LUE AVF creation vs AVG insertion    CARDIOVERSION  04/30/15    CHOLECYSTECTOMY      COLONOSCOPY  April 20, 2011    Diverticulosis, repeat recommended in 3 yrs., repeat colonoscopy 2014 revealed 2 polyps.  He should return in 5 years.    COLONOSCOPY N/A 3/13/2020    Procedure: COLONOSCOPY;  Surgeon: Chon Casper MD;  [facility]  Service: Endoscopy;  Laterality: N/A;  ok to hold coumadin x5days-see telephone encounter 2/4/20-tb    COLONOSCOPY N/A 2/4/2021    Procedure: COLONOSCOPY;  Surgeon: Chon Casper MD;  Location: [facility] (4TH FLR);  Service: Endoscopy;  Laterality: N/A;  Eliquis - per Dr. GAVIN Blunt ok to hold x 2 days and "restarted asap"- ERW  last prep "poor", [id number] ordered/ Pt requests Dr. Casper  prep ins. mailed - COVID screening on 2/1/21 PCW- ERW    " COLONOSCOPY N/A 3/3/2021    Procedure: COLONOSCOPY;  Surgeon: Chon Casper MD;  Location: [facility] ENDO (4TH FLR);  Service: Endoscopy;  Laterality: N/A;  Patient with his second poor bowel pre and has poor prep today.  If patient not intersted or can't get colonoscopy tomorrow will need constipation bowel prep and will need to restart his Eliquis today.Thanks,Chon     per Dr Lopez to hold Eliquis 2 days prior      COVID     COLONOSCOPY N/A 12/6/2024    Procedure: COLONOSCOPY;  Surgeon: Chon Casper MD;  Location: [facility] ENDO (2ND FLR);  Service: Endoscopy;  Laterality: N/A;  Plavix-Eliquis pending/ HD T-Th-Sat- lab ordered  2/25/24 ECHO PA 49  ref by / [facility] portal-RN  11/29-message to clearance nurse for plavix and eliquis hold-padmini  ok to hold Eliquis 2 days per Dr Agata chua to hold Plavix 5 days per MEENA COLVINGT  12/2- p    CORONARY ANGIOGRAPHY N/A 2/28/2024    Procedure: ANGIOGRAM, CORONARY ARTERY;  Surgeon: Tylor Lincoln MD;  Location: [facility] CATH LAB;  Service: Cardiology;  Laterality: N/A;    CORONARY ANGIOPLASTY WITH STENT PLACEMENT  9/13/2006    FISTULOGRAM N/A 2/19/2024    Procedure: Fistulogram;  Surgeon: JUANI Melendez III, MD;  Location: [facility] CATH LAB;  Service: Peripheral Vascular;  Laterality: N/A;    FISTULOGRAM, WITH PTA Left 6/3/2024    Procedure: FISTULOGRAM, WITH PTA;  Surgeon: JUANI Melendez III, MD;  [facility];  Service: Vascular;  Laterality: Left;  mGy: 51.58  GyCm2: 6.8285  Fluoro time: 5.1 min  Contrast: 28 cc    IRRIGATION AND DEBRIDEMENT Right 8/30/2023    Procedure: IRRIGATION AND DEBRIDEMENT, RIGHT MIDDLE FINGER;  Surgeon: Martin Larsen MD;  [facility];  Service: Orthopedics;  Laterality: Right;    KIDNEY TRANSPLANT  2005    LEFT HEART CATHETERIZATION N/A 2/26/2024    Procedure: Left heart cath;  Surgeon: Tylor Lincoln MD;  Location: [facility] CATH LAB;  Service: Cardiology;  Laterality: N/A;     PARATHYROIDECTOMY      PERCUTANEOUS TRANSLUMINAL ANGIOPLASTY OF ARTERIOVENOUS FISTULA Left 2/19/2024    Procedure: PTA, AV FISTULA;  Surgeon: JUANI Melendez III, MD;  Location: [facility] CATH LAB;  Service: Peripheral Vascular;  Laterality: Left;    STENT, DRUG ELUTING, SINGLE VESSEL, CORONARY N/A 2/28/2024    Procedure: Stent, Drug Eluting, Single Vessel, Coronary;  Surgeon: Tylor Lincoln MD;  Location: [facility] CATH LAB;  Service: Cardiology;  Laterality: N/A;    TREATMENT OF CARDIAC ARRHYTHMIA N/A 3/28/2022    Procedure: CARDIOVERSION;  Surgeon: Migue Blunt MD;  Location: [facility] EP LAB;  Service: Cardiology;  Laterality: N/A;  AF, DCC, MAC, GP, 3 PREP*RODRIGO deferred pt 100% compliant*    VENOPLASTY  6/3/2024    Procedure: ANGIOPLASTY, VEIN;  Surgeon: JUANI Melendez III, MD;  Location: [facility] OR [address] [place] [address];  Service: Vascular;;  Left subclavian         Current Outpatient Medications:     acetaminophen (TYLENOL) 500 MG tablet, Take 1 tablet (500 mg total) by mouth every 6 (six) hours as needed for Pain., Disp: 20 tablet, Rfl: 0    albuterol (VENTOLIN HFA) 90 mcg/actuation inhaler, Inhale 2 puffs into the lungs every 6 (six) hours as needed for Wheezing or Shortness of Breath. Rescue, Disp: 18 g, Rfl: 3    albuterol-ipratropium (DUO-NEB) 2.5 mg-0.5 mg/3 mL nebulizer solution, Take 3 mLs by nebulization every 4 (four) hours as needed for Wheezing. Rescue, Disp: 75 mL, Rfl: 0    amiodarone (PACERONE) 200 MG Tab, TAKE 1 TABLET BY MOUTH EVERY DAY, Disp: 90 tablet, Rfl: 3    atorvastatin (LIPITOR) 80 MG tablet, Take 1 tablet (80 mg total) by mouth once daily., Disp: 90 tablet, Rfl: 3    b complex vitamins (B COMPLEX-VITAMIN B12) tablet, Take 1 tablet by mouth once daily., Disp: 90 tablet, Rfl: 3    CHOLECALCIFEROL, VITAMIN D3, ORAL, Take 2 tablets by mouth 2 (two) times daily., Disp: , Rfl:     clopidogreL (PLAVIX) 75 mg tablet, Take 1 tablet (75 mg total) by mouth once daily., Disp: 90 tablet, Rfl: 3    doxazosin (CARDURA) 4  MG tablet, Take 1 tablet (4 mg total) by mouth every 12 (twelve) hours., Disp: 180 tablet, Rfl: 3    ELIQUIS 5 mg Tab, TAKE 1 TABLET BY MOUTH TWICE A DAY, Disp: 60 tablet, Rfl: 32    famotidine (PEPCID) 20 MG tablet, TAKE 1 TABLET (20 MG TOTAL) BY MOUTH ONCE DAILY. ON DIALYSIS DAYS, PLEASE TAKE THIS MEDICINE AFTER DIALYSIS., Disp: 90 tablet, Rfl: 1    fexofenadine HCl (ALLEGRA ORAL), Take 1 tablet by mouth daily as needed (allergies)., Disp: , Rfl:     fluticasone propionate (FLONASE) 50 mcg/actuation nasal spray, 1 spray (50 mcg total) by Each Nostril route daily as needed for Allergies., Disp: , Rfl:     gabapentin (NEURONTIN) 300 MG capsule, Take 1 capsule (300 mg total) by mouth every evening., Disp: 90 capsule, Rfl: 3    metoprolol succinate (TOPROL-XL) 25 MG 24 hr tablet, Take 0.5 tablets (12.5 mg total) by mouth once daily., Disp: 45 tablet, Rfl: 3    multivit-min-FA-lycopen-lutein (CENTRUM SILVER) 0.4 mg-300 mcg- 250 mcg Tab, Take 1 tablet by mouth once daily., Disp: , Rfl:     NIFEdipine (PROCARDIA-XL) 30 MG (OSM) 24 hr tablet, Take 1 tablet (30 mg total) by mouth 2 (two) times a day., Disp: 180 tablet, Rfl: 3    omeprazole (PRILOSEC) 20 MG capsule, Take 1 capsule (20 mg total) by mouth daily as needed (heartburn)., Disp: , Rfl:     pulse oximeter (PULSE OXIMETER) device, by Apply Externally route 2 (two) times a day. Use twice daily at 8 AM and 3 PM and [facility]., Disp: 1 each, Rfl: 0    torsemide (DEMADEX) 20 MG Tab, Take 2 tablets (40 mg total) by mouth 3 (three) times a week. (Patient taking differently: Take 40 mg by mouth 3 (three) times a week. Pt reports taking 40 mg BID), Disp: 90 tablet, Rfl: 3    calcium carbonate (CALCIUM 600 ORAL), Take 1 tablet by mouth 2 (two) times a day. (Patient not taking: Reported on 2/21/2025), Disp: , Rfl:     fluticasone furoate-vilanteroL (BREO) 100-25 mcg/dose diskus inhaler, Inhale 1 puff into the lungs once daily. Controller. Use  this inhaler every day. (Patient not taking: Reported on 2/21/2025), Disp: , Rfl:     multivitamin (THERAGRAN) per tablet, Take 1 tablet by mouth Daily. (Patient not taking: Reported on 2/21/2025), Disp: , Rfl:     nitroGLYCERIN (NITROSTAT) 0.4 MG SL tablet, Place 1 tablet (0.4 mg total) under the tongue every 5 (five) minutes as needed for Chest pain. (Patient not taking: Reported on 2/21/2025), Disp: 25 tablet, Rfl: 0    predniSONE (DELTASONE) 5 MG tablet, Take 1 tablet (5 mg total) by mouth once daily. (Patient not taking: Reported on 2/21/2025), Disp: 90 tablet, Rfl: 3    tacrolimus (PROGRAF) 1 MG Cap, Take 3 capsules (3 mg total) by mouth every 12 (twelve) hours for 60 days, THEN 2 capsules (2 mg total) every 12 (twelve) hours for 60 days, THEN 1 capsule (1 mg total) every 12 (twelve) hours for 60 days, THEN 1 capsule (1 mg total) once daily., Disp: 540 capsule, Rfl: 4    traMADoL (ULTRAM) 50 mg tablet, Take 1 tablet (50 mg total) by mouth every 6 (six) hours as needed for Pain. (Patient not taking: Reported on 2/21/2025), Disp: 20 tablet, Rfl: 0    Review of patient's allergies indicates:   Allergen Reactions    Ace inhibitors Swelling    Verapamil Other (See Comments)     Other reaction(s): Unknown    Povidone-iodine Itching       Family History   Problem Relation Name Age of Onset    No Known Problems Sister      No Known Problems Brother      Thyroid disease Mother          s/p surgery    Heart disease Father          had pacemaker    No Known Problems Sister      Kidney failure Sister      Kidney disease Sister      No Known Problems Sister      Kidney disease Brother      Kidney failure Brother          s/p transplant    Diabetes Mellitus Neg Hx      Stroke Neg Hx      Heart attack Neg Hx      Cancer Neg Hx      Celiac disease Neg Hx      Cirrhosis Neg Hx      Colon cancer Neg Hx      Esophageal cancer Neg Hx      Inflammatory bowel disease Neg Hx      Rectal cancer Neg Hx      Stomach cancer Neg Hx       "Ulcerative colitis Neg Hx      Liver disease Neg Hx      Liver cancer Neg Hx      Crohn's disease Neg Hx      Melanoma Neg Hx         Social History     Tobacco Use    Smoking status: Every Day     Current packs/day: 0.00     Average packs/day: 0.5 packs/day for 40.0 years (20.0 ttl pk-yrs)     Types: Cigarettes     Start date: 1982     Last attempt to quit: 2022     Years since quittin.9    Smokeless tobacco: Never    Tobacco comments:     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     Patient is currently retired   Substance Use Topics    Alcohol use: No    Drug use: No       PHYSICAL EXAM:   /62 (BP Location: Right arm, Patient Position: Sitting)   Pulse 70   Temp 97.6 °F (36.4 °C) (Oral)   Resp 18   Ht 6' 1" (1.854 m)   Wt 84 kg (185 lb 3 oz)   BMI 24.43 kg/m²   Constitutional:  Alert,   Well-appearing  In no distress.   Neurological: Normal speech  no focal findings  CN II - XII grossly intact.    Psychiatric: Mood and affect appropriate and symmetric.   HEENT: Normocephalic / atraumatic  PERRLA  Midline trachea  No scars across the neck   Cardiac: Regular rate and rhythm.   Pulmonary: Normal pulmonary effort.   Abdomen: Soft, not distended.     Skin: Warm and well perfused.    Vascular:  Left radial pulses nonpalpable   Extremities/  Musculoskeletal: Left arm shows a very pulsatile AV fistula which finishing since 2 a modest thrill proximally 2 cm from the arterial anastomosis, the becomes a bit aneurysmal     2024    IMAGING:  Duplex of the fistula shows a new para anastomotic stenosis with a velocity of 536, moderate outflow stenosis with a velocity of 381, flow volume markedly reduced to 536 (prior 2.8)    Prior fistulogram was personally reviewed      IMPRESSION:  New high-grade para anastomotic stenosis, moderate outflow stenosis.  The most pressing of these of the para anastomotic stenosis.  Intervention is warranted.  He is not a " transradial candidate due to lack of a radial pulse    PLAN:  Antegrade left fistulogram and intervention Wednesday March 5, 2025  Hybrid OR 11 case    I have explained the risks, benefits and alternatives for this procedure in detail.  The patient voices understanding and all questions have be answered, and agrees to proceed with the procedure.     INDIGO Melendez III, MD, FACS  Professor and Chief, Vascular and Endovascular Surgery

## 2025-02-28 ENCOUNTER — OFFICE VISIT (OUTPATIENT)
Dept: INTERNAL MEDICINE | Facility: CLINIC | Age: 70
End: 2025-02-28
Payer: MEDICARE

## 2025-02-28 ENCOUNTER — TELEPHONE (OUTPATIENT)
Dept: VASCULAR SURGERY | Facility: CLINIC | Age: 70
End: 2025-02-28
Payer: MEDICARE

## 2025-02-28 ENCOUNTER — DOCUMENTATION ONLY (OUTPATIENT)
Dept: VASCULAR SURGERY | Facility: CLINIC | Age: 70
End: 2025-02-28
Payer: MEDICARE

## 2025-02-28 ENCOUNTER — HOSPITAL ENCOUNTER (OUTPATIENT)
Facility: HOSPITAL | Age: 70
Discharge: HOME | End: 2025-03-01
Attending: EMERGENCY MEDICINE | Admitting: STUDENT IN AN ORGANIZED HEALTH CARE EDUCATION/TRAINING PROGRAM
Payer: MEDICARE

## 2025-02-28 VITALS
OXYGEN SATURATION: 97 % | DIASTOLIC BLOOD PRESSURE: 47 MMHG | HEART RATE: 98 BPM | BODY MASS INDEX: 24.83 KG/M2 | HEIGHT: 73 IN | SYSTOLIC BLOOD PRESSURE: 96 MMHG | WEIGHT: 187.38 LBS

## 2025-02-28 DIAGNOSIS — I13.0 HYPERTENSIVE HEART AND RENAL DISEASE WITH RENAL FAILURE, STAGE 1 THROUGH STAGE 4 OR UNSPECIFIED CHRONIC KIDNEY DISEASE, WITH HEART FAILURE: ICD-10-CM

## 2025-02-28 DIAGNOSIS — I95.9 HYPOTENSION, UNSPECIFIED HYPOTENSION TYPE: Primary | ICD-10-CM

## 2025-02-28 DIAGNOSIS — R07.9 CHEST PAIN: ICD-10-CM

## 2025-02-28 DIAGNOSIS — E87.5 HYPERKALEMIA: ICD-10-CM

## 2025-02-28 DIAGNOSIS — I25.10 CORONARY ARTERY DISEASE INVOLVING NATIVE CORONARY ARTERY OF NATIVE HEART WITHOUT ANGINA PECTORIS: ICD-10-CM

## 2025-02-28 DIAGNOSIS — J44.9 CHRONIC OBSTRUCTIVE PULMONARY DISEASE, UNSPECIFIED COPD TYPE: ICD-10-CM

## 2025-02-28 DIAGNOSIS — R09.82 POST-NASAL DRIP: ICD-10-CM

## 2025-02-28 DIAGNOSIS — I48.0 PAROXYSMAL ATRIAL FIBRILLATION: ICD-10-CM

## 2025-02-28 DIAGNOSIS — E78.2 MIXED HYPERLIPIDEMIA: ICD-10-CM

## 2025-02-28 DIAGNOSIS — Z99.2 ESRD (END STAGE RENAL DISEASE) ON DIALYSIS: ICD-10-CM

## 2025-02-28 DIAGNOSIS — N18.6 ESRD (END STAGE RENAL DISEASE) ON DIALYSIS: ICD-10-CM

## 2025-02-28 DIAGNOSIS — I10 PRIMARY HYPERTENSION: ICD-10-CM

## 2025-02-28 PROBLEM — K63.5 COLON POLYPS: Status: RESOLVED | Noted: 2021-03-03 | Resolved: 2025-02-28

## 2025-02-28 PROBLEM — Z71.89 ADVANCED CARE PLANNING/COUNSELING DISCUSSION: Status: RESOLVED | Noted: 2023-10-18 | Resolved: 2025-02-28

## 2025-02-28 PROBLEM — N49.2 SCROTAL ABSCESS: Status: RESOLVED | Noted: 2024-08-12 | Resolved: 2025-02-28

## 2025-02-28 PROBLEM — D63.1 ANEMIA IN STAGE 5 CHRONIC KIDNEY DISEASE, NOT ON CHRONIC DIALYSIS: Status: RESOLVED | Noted: 2023-08-30 | Resolved: 2025-02-28

## 2025-02-28 PROBLEM — Z71.89 GOALS OF CARE, COUNSELING/DISCUSSION: Status: RESOLVED | Noted: 2023-03-01 | Resolved: 2025-02-28

## 2025-02-28 PROBLEM — Z71.89 ADVANCE CARE PLANNING: Status: RESOLVED | Noted: 2023-03-01 | Resolved: 2025-02-28

## 2025-02-28 PROBLEM — Z94.0 KIDNEY REPLACED BY TRANSPLANT: Status: RESOLVED | Noted: 2021-04-26 | Resolved: 2025-02-28

## 2025-02-28 PROBLEM — D12.6 COLON ADENOMA: Status: RESOLVED | Noted: 2021-02-04 | Resolved: 2025-02-28

## 2025-02-28 PROBLEM — Z51.5 PALLIATIVE CARE ENCOUNTER: Status: RESOLVED | Noted: 2023-03-01 | Resolved: 2025-02-28

## 2025-02-28 PROBLEM — R00.1 BRADYCARDIA: Status: RESOLVED | Noted: 2024-02-19 | Resolved: 2025-02-28

## 2025-02-28 PROBLEM — N18.5 ANEMIA IN STAGE 5 CHRONIC KIDNEY DISEASE, NOT ON CHRONIC DIALYSIS: Status: RESOLVED | Noted: 2023-08-30 | Resolved: 2025-02-28

## 2025-02-28 PROBLEM — Z01.818 PRE-OP EVALUATION: Status: RESOLVED | Noted: 2023-11-17 | Resolved: 2025-02-28

## 2025-02-28 PROBLEM — Z29.89 PROPHYLACTIC IMMUNOTHERAPY: Status: RESOLVED | Noted: 2023-02-08 | Resolved: 2025-02-28

## 2025-02-28 PROBLEM — D84.9 IMMUNOSUPPRESSION: Status: RESOLVED | Noted: 2023-10-25 | Resolved: 2025-02-28

## 2025-02-28 PROBLEM — M25.571 RIGHT ANKLE PAIN: Status: RESOLVED | Noted: 2023-02-26 | Resolved: 2025-02-28

## 2025-02-28 LAB
ALBUMIN SERPL BCP-MCNC: 2.6 G/DL (ref 3.5–5.2)
ALBUMIN SERPL BCP-MCNC: 3.3 G/DL (ref 3.5–5.2)
ALP SERPL-CCNC: 67 U/L (ref 40–150)
ALP SERPL-CCNC: 88 U/L (ref 40–150)
ALT SERPL W/O P-5'-P-CCNC: 20 U/L (ref 10–44)
ALT SERPL W/O P-5'-P-CCNC: 24 U/L (ref 10–44)
ANION GAP SERPL CALC-SCNC: 13 MMOL/L (ref 8–16)
ANION GAP SERPL CALC-SCNC: 13 MMOL/L (ref 8–16)
ANION GAP SERPL CALC-SCNC: 16 MMOL/L (ref 8–16)
AST SERPL-CCNC: 19 U/L (ref 10–40)
AST SERPL-CCNC: 20 U/L (ref 10–40)
BASOPHILS # BLD AUTO: 0.05 K/UL (ref 0–0.2)
BASOPHILS NFR BLD: 0.5 % (ref 0–1.9)
BILIRUB SERPL-MCNC: 0.3 MG/DL (ref 0.1–1)
BILIRUB SERPL-MCNC: 0.4 MG/DL (ref 0.1–1)
BUN SERPL-MCNC: 38 MG/DL (ref 8–23)
BUN SERPL-MCNC: 39 MG/DL (ref 8–23)
BUN SERPL-MCNC: 42 MG/DL (ref 8–23)
BUN SERPL-MCNC: 44 MG/DL (ref 6–30)
BUN SERPL-MCNC: 45 MG/DL (ref 6–30)
CALCIUM SERPL-MCNC: 15.4 MG/DL (ref 8.7–10.5)
CALCIUM SERPL-MCNC: 8.5 MG/DL (ref 8.7–10.5)
CALCIUM SERPL-MCNC: 9.3 MG/DL (ref 8.7–10.5)
CHLORIDE SERPL-SCNC: 101 MMOL/L (ref 95–110)
CHLORIDE SERPL-SCNC: 105 MMOL/L (ref 95–110)
CHLORIDE SERPL-SCNC: 106 MMOL/L (ref 95–110)
CHLORIDE SERPL-SCNC: 108 MMOL/L (ref 95–110)
CHLORIDE SERPL-SCNC: 99 MMOL/L (ref 95–110)
CO2 SERPL-SCNC: 16 MMOL/L (ref 23–29)
CO2 SERPL-SCNC: 19 MMOL/L (ref 23–29)
CO2 SERPL-SCNC: 21 MMOL/L (ref 23–29)
CREAT SERPL-MCNC: 10.2 MG/DL (ref 0.5–1.4)
CREAT SERPL-MCNC: 8.1 MG/DL (ref 0.5–1.4)
CREAT SERPL-MCNC: 8.6 MG/DL (ref 0.5–1.4)
CREAT SERPL-MCNC: 9 MG/DL (ref 0.5–1.4)
CREAT SERPL-MCNC: 9.1 MG/DL (ref 0.5–1.4)
DIFFERENTIAL METHOD BLD: ABNORMAL
EOSINOPHIL # BLD AUTO: 1.1 K/UL (ref 0–0.5)
EOSINOPHIL NFR BLD: 11.6 % (ref 0–8)
ERYTHROCYTE [DISTWIDTH] IN BLOOD BY AUTOMATED COUNT: 16.3 % (ref 11.5–14.5)
EST. GFR  (NO RACE VARIABLE): 5.7 ML/MIN/1.73 M^2
EST. GFR  (NO RACE VARIABLE): 6.1 ML/MIN/1.73 M^2
EST. GFR  (NO RACE VARIABLE): 6.6 ML/MIN/1.73 M^2
GLUCOSE SERPL-MCNC: 102 MG/DL (ref 70–110)
GLUCOSE SERPL-MCNC: 124 MG/DL (ref 70–110)
GLUCOSE SERPL-MCNC: 129 MG/DL (ref 70–110)
GLUCOSE SERPL-MCNC: 87 MG/DL (ref 70–110)
GLUCOSE SERPL-MCNC: 94 MG/DL (ref 70–110)
HCT VFR BLD AUTO: 49.8 % (ref 40–54)
HCT VFR BLD CALC: 43 %PCV (ref 36–54)
HCT VFR BLD CALC: 52 %PCV (ref 36–54)
HCV AB SERPL QL IA: NORMAL
HGB BLD-MCNC: 15.1 G/DL (ref 14–18)
HIV 1+2 AB+HIV1 P24 AG SERPL QL IA: NORMAL
IMM GRANULOCYTES # BLD AUTO: 0.06 K/UL (ref 0–0.04)
IMM GRANULOCYTES NFR BLD AUTO: 0.6 % (ref 0–0.5)
LYMPHOCYTES # BLD AUTO: 2.4 K/UL (ref 1–4.8)
LYMPHOCYTES NFR BLD: 24.7 % (ref 18–48)
MAGNESIUM SERPL-MCNC: 2.4 MG/DL (ref 1.6–2.6)
MCH RBC QN AUTO: 25.8 PG (ref 27–31)
MCHC RBC AUTO-ENTMCNC: 30.3 G/DL (ref 32–36)
MCV RBC AUTO: 85 FL (ref 82–98)
MONOCYTES # BLD AUTO: 1.2 K/UL (ref 0.3–1)
MONOCYTES NFR BLD: 12.1 % (ref 4–15)
NEUTROPHILS # BLD AUTO: 4.9 K/UL (ref 1.8–7.7)
NEUTROPHILS NFR BLD: 50.5 % (ref 38–73)
NRBC BLD-RTO: 0 /100 WBC
PHOSPHATE SERPL-MCNC: 5.9 MG/DL (ref 2.7–4.5)
PLATELET # BLD AUTO: 187 K/UL (ref 150–450)
PMV BLD AUTO: 11.4 FL (ref 9.2–12.9)
POC IONIZED CALCIUM: 0.95 MMOL/L (ref 1.06–1.42)
POC IONIZED CALCIUM: 1.84 MMOL/L (ref 1.06–1.42)
POC TCO2 (MEASURED): 21 MMOL/L (ref 23–29)
POC TCO2 (MEASURED): 25 MMOL/L (ref 23–29)
POCT GLUCOSE: 85 MG/DL (ref 70–110)
POCT GLUCOSE: 93 MG/DL (ref 70–110)
POTASSIUM BLD-SCNC: 4.4 MMOL/L (ref 3.5–5.1)
POTASSIUM BLD-SCNC: 5.9 MMOL/L (ref 3.5–5.1)
POTASSIUM SERPL-SCNC: 4.7 MMOL/L (ref 3.5–5.1)
POTASSIUM SERPL-SCNC: 5.4 MMOL/L (ref 3.5–5.1)
POTASSIUM SERPL-SCNC: 6.2 MMOL/L (ref 3.5–5.1)
PROT SERPL-MCNC: 6.6 G/DL (ref 6–8.4)
PROT SERPL-MCNC: 8.5 G/DL (ref 6–8.4)
RBC # BLD AUTO: 5.85 M/UL (ref 4.6–6.2)
SAMPLE: ABNORMAL
SAMPLE: ABNORMAL
SODIUM BLD-SCNC: 134 MMOL/L (ref 136–145)
SODIUM BLD-SCNC: 135 MMOL/L (ref 136–145)
SODIUM SERPL-SCNC: 135 MMOL/L (ref 136–145)
SODIUM SERPL-SCNC: 136 MMOL/L (ref 136–145)
SODIUM SERPL-SCNC: 137 MMOL/L (ref 136–145)
WBC # BLD AUTO: 9.6 K/UL (ref 3.9–12.7)

## 2025-02-28 PROCEDURE — 96375 TX/PRO/DX INJ NEW DRUG ADDON: CPT | Mod: HCNC

## 2025-02-28 PROCEDURE — 82962 GLUCOSE BLOOD TEST: CPT | Mod: HCNC

## 2025-02-28 PROCEDURE — 99291 CRITICAL CARE FIRST HOUR: CPT | Mod: HCNC

## 2025-02-28 PROCEDURE — 25000003 PHARM REV CODE 250: Mod: HCNC | Performed by: EMERGENCY MEDICINE

## 2025-02-28 PROCEDURE — 94761 N-INVAS EAR/PLS OXIMETRY MLT: CPT | Mod: HCNC

## 2025-02-28 PROCEDURE — 63600175 PHARM REV CODE 636 W HCPCS: Mod: HCNC

## 2025-02-28 PROCEDURE — 96365 THER/PROPH/DIAG IV INF INIT: CPT | Mod: HCNC

## 2025-02-28 PROCEDURE — 93005 ELECTROCARDIOGRAM TRACING: CPT | Mod: HCNC

## 2025-02-28 PROCEDURE — 63600175 PHARM REV CODE 636 W HCPCS: Mod: HCNC | Performed by: EMERGENCY MEDICINE

## 2025-02-28 PROCEDURE — 84100 ASSAY OF PHOSPHORUS: CPT | Mod: HCNC | Performed by: EMERGENCY MEDICINE

## 2025-02-28 PROCEDURE — 86803 HEPATITIS C AB TEST: CPT | Mod: HCNC | Performed by: PHYSICIAN ASSISTANT

## 2025-02-28 PROCEDURE — 80047 BASIC METABLC PNL IONIZED CA: CPT | Mod: HCNC

## 2025-02-28 PROCEDURE — 85025 COMPLETE CBC W/AUTO DIFF WBC: CPT | Mod: 91,HCNC | Performed by: EMERGENCY MEDICINE

## 2025-02-28 PROCEDURE — 93010 ELECTROCARDIOGRAM REPORT: CPT | Mod: HCNC,76,, | Performed by: INTERNAL MEDICINE

## 2025-02-28 PROCEDURE — 83735 ASSAY OF MAGNESIUM: CPT | Mod: HCNC | Performed by: EMERGENCY MEDICINE

## 2025-02-28 PROCEDURE — 94640 AIRWAY INHALATION TREATMENT: CPT | Mod: HCNC

## 2025-02-28 PROCEDURE — 87389 HIV-1 AG W/HIV-1&-2 AB AG IA: CPT | Mod: HCNC | Performed by: PHYSICIAN ASSISTANT

## 2025-02-28 PROCEDURE — 96374 THER/PROPH/DIAG INJ IV PUSH: CPT | Mod: 59,HCNC

## 2025-02-28 PROCEDURE — 80048 BASIC METABOLIC PNL TOTAL CA: CPT | Mod: 91,HCNC,XB | Performed by: NURSE PRACTITIONER

## 2025-02-28 PROCEDURE — 25000242 PHARM REV CODE 250 ALT 637 W/ HCPCS: Mod: HCNC | Performed by: EMERGENCY MEDICINE

## 2025-02-28 PROCEDURE — G0378 HOSPITAL OBSERVATION PER HR: HCPCS | Mod: HCNC

## 2025-02-28 PROCEDURE — 80053 COMPREHEN METABOLIC PANEL: CPT | Mod: HCNC

## 2025-02-28 PROCEDURE — 93010 ELECTROCARDIOGRAM REPORT: CPT | Mod: HCNC,,, | Performed by: INTERNAL MEDICINE

## 2025-02-28 PROCEDURE — 99999 PR PBB SHADOW E&M-EST. PATIENT-LVL V: CPT | Mod: PBBFAC,HCNC,, | Performed by: STUDENT IN AN ORGANIZED HEALTH CARE EDUCATION/TRAINING PROGRAM

## 2025-02-28 PROCEDURE — 80053 COMPREHEN METABOLIC PANEL: CPT | Mod: 91,HCNC | Performed by: EMERGENCY MEDICINE

## 2025-02-28 PROCEDURE — 25000003 PHARM REV CODE 250: Mod: HCNC | Performed by: NURSE PRACTITIONER

## 2025-02-28 RX ORDER — SODIUM CHLORIDE 0.9 % (FLUSH) 0.9 %
10 SYRINGE (ML) INJECTION EVERY 12 HOURS PRN
Status: DISCONTINUED | OUTPATIENT
Start: 2025-02-28 | End: 2025-03-01 | Stop reason: HOSPADM

## 2025-02-28 RX ORDER — IPRATROPIUM BROMIDE AND ALBUTEROL SULFATE 2.5; .5 MG/3ML; MG/3ML
3 SOLUTION RESPIRATORY (INHALATION) EVERY 4 HOURS PRN
Status: DISCONTINUED | OUTPATIENT
Start: 2025-02-28 | End: 2025-03-01 | Stop reason: HOSPADM

## 2025-02-28 RX ORDER — ATORVASTATIN CALCIUM 80 MG/1
80 TABLET, FILM COATED ORAL DAILY
Qty: 90 TABLET | Refills: 1 | Status: SHIPPED | OUTPATIENT
Start: 2025-02-28

## 2025-02-28 RX ORDER — FLUTICASONE PROPIONATE 50 MCG
1 SPRAY, SUSPENSION (ML) NASAL DAILY
Status: DISCONTINUED | OUTPATIENT
Start: 2025-03-01 | End: 2025-03-01 | Stop reason: HOSPADM

## 2025-02-28 RX ORDER — IBUPROFEN 200 MG
24 TABLET ORAL
Status: DISCONTINUED | OUTPATIENT
Start: 2025-02-28 | End: 2025-03-01 | Stop reason: HOSPADM

## 2025-02-28 RX ORDER — IPRATROPIUM BROMIDE 21 UG/1
2 SPRAY, METERED NASAL 3 TIMES DAILY
Qty: 30 ML | Refills: 0 | Status: SHIPPED | OUTPATIENT
Start: 2025-02-28

## 2025-02-28 RX ORDER — CLOPIDOGREL BISULFATE 75 MG/1
75 TABLET ORAL DAILY
Status: DISCONTINUED | OUTPATIENT
Start: 2025-03-01 | End: 2025-03-01 | Stop reason: HOSPADM

## 2025-02-28 RX ORDER — ONDANSETRON HYDROCHLORIDE 2 MG/ML
4 INJECTION, SOLUTION INTRAVENOUS EVERY 8 HOURS PRN
Status: DISCONTINUED | OUTPATIENT
Start: 2025-02-28 | End: 2025-03-01 | Stop reason: HOSPADM

## 2025-02-28 RX ORDER — NALOXONE HCL 0.4 MG/ML
0.02 VIAL (ML) INJECTION
Status: DISCONTINUED | OUTPATIENT
Start: 2025-02-28 | End: 2025-03-01 | Stop reason: HOSPADM

## 2025-02-28 RX ORDER — IBUPROFEN 200 MG
16 TABLET ORAL
Status: DISCONTINUED | OUTPATIENT
Start: 2025-02-28 | End: 2025-03-01 | Stop reason: HOSPADM

## 2025-02-28 RX ORDER — CHOLECALCIFEROL (VITAMIN D3) 10(400)/ML
800 DROPS ORAL DAILY
Status: DISCONTINUED | OUTPATIENT
Start: 2025-03-01 | End: 2025-02-28

## 2025-02-28 RX ORDER — GLUCAGON 1 MG
1 KIT INJECTION
Status: DISCONTINUED | OUTPATIENT
Start: 2025-02-28 | End: 2025-03-01 | Stop reason: HOSPADM

## 2025-02-28 RX ORDER — IPRATROPIUM BROMIDE 42 UG/1
1 SPRAY, METERED NASAL 3 TIMES DAILY
Status: DISCONTINUED | OUTPATIENT
Start: 2025-02-28 | End: 2025-03-01 | Stop reason: HOSPADM

## 2025-02-28 RX ORDER — ALBUTEROL SULFATE 2.5 MG/.5ML
15 SOLUTION RESPIRATORY (INHALATION)
Status: COMPLETED | OUTPATIENT
Start: 2025-02-28 | End: 2025-02-28

## 2025-02-28 RX ORDER — TALC
6 POWDER (GRAM) TOPICAL NIGHTLY PRN
Status: DISCONTINUED | OUTPATIENT
Start: 2025-02-28 | End: 2025-03-01 | Stop reason: HOSPADM

## 2025-02-28 RX ORDER — CETIRIZINE HYDROCHLORIDE 5 MG/1
10 TABLET ORAL DAILY
Status: DISCONTINUED | OUTPATIENT
Start: 2025-03-01 | End: 2025-03-01 | Stop reason: HOSPADM

## 2025-02-28 RX ORDER — ACETAMINOPHEN 500 MG
500 TABLET ORAL EVERY 4 HOURS PRN
Status: DISCONTINUED | OUTPATIENT
Start: 2025-02-28 | End: 2025-03-01 | Stop reason: HOSPADM

## 2025-02-28 RX ORDER — DOXAZOSIN 2 MG/1
4 TABLET ORAL EVERY 12 HOURS
Status: DISCONTINUED | OUTPATIENT
Start: 2025-02-28 | End: 2025-03-01 | Stop reason: HOSPADM

## 2025-02-28 RX ORDER — FUROSEMIDE 10 MG/ML
120 INJECTION INTRAMUSCULAR; INTRAVENOUS
Status: COMPLETED | OUTPATIENT
Start: 2025-02-28 | End: 2025-02-28

## 2025-02-28 RX ORDER — GABAPENTIN 300 MG/1
300 CAPSULE ORAL NIGHTLY
Status: DISCONTINUED | OUTPATIENT
Start: 2025-02-28 | End: 2025-03-01 | Stop reason: HOSPADM

## 2025-02-28 RX ORDER — FLUTICASONE FUROATE AND VILANTEROL 100; 25 UG/1; UG/1
1 POWDER RESPIRATORY (INHALATION) DAILY
Status: DISCONTINUED | OUTPATIENT
Start: 2025-03-01 | End: 2025-03-01 | Stop reason: HOSPADM

## 2025-02-28 RX ORDER — ATORVASTATIN CALCIUM 40 MG/1
80 TABLET, FILM COATED ORAL DAILY
Status: DISCONTINUED | OUTPATIENT
Start: 2025-03-01 | End: 2025-03-01 | Stop reason: HOSPADM

## 2025-02-28 RX ORDER — CALCIUM GLUCONATE 20 MG/ML
1 INJECTION, SOLUTION INTRAVENOUS
Status: COMPLETED | OUTPATIENT
Start: 2025-02-28 | End: 2025-02-28

## 2025-02-28 RX ORDER — FAMOTIDINE 20 MG/1
20 TABLET, FILM COATED ORAL DAILY
Status: DISCONTINUED | OUTPATIENT
Start: 2025-03-01 | End: 2025-03-01 | Stop reason: HOSPADM

## 2025-02-28 RX ORDER — AMIODARONE HYDROCHLORIDE 200 MG/1
200 TABLET ORAL DAILY
Status: DISCONTINUED | OUTPATIENT
Start: 2025-03-01 | End: 2025-03-01 | Stop reason: HOSPADM

## 2025-02-28 RX ORDER — SODIUM CHLORIDE 0.9 % (FLUSH) 0.9 %
10 SYRINGE (ML) INJECTION
Status: DISCONTINUED | OUTPATIENT
Start: 2025-02-28 | End: 2025-03-01 | Stop reason: HOSPADM

## 2025-02-28 RX ADMIN — FUROSEMIDE 120 MG: 10 INJECTION, SOLUTION INTRAVENOUS at 08:02

## 2025-02-28 RX ADMIN — DEXTROSE MONOHYDRATE 25 G: 25 INJECTION, SOLUTION INTRAVENOUS at 07:02

## 2025-02-28 RX ADMIN — INSULIN HUMAN 5 UNITS: 100 INJECTION, SOLUTION PARENTERAL at 07:02

## 2025-02-28 RX ADMIN — SODIUM ZIRCONIUM CYCLOSILICATE 10 G: 10 POWDER, FOR SUSPENSION ORAL at 09:02

## 2025-02-28 RX ADMIN — CALCIUM GLUCONATE 1 G: 20 INJECTION, SOLUTION INTRAVENOUS at 07:02

## 2025-02-28 RX ADMIN — GABAPENTIN 300 MG: 300 CAPSULE ORAL at 09:02

## 2025-02-28 RX ADMIN — DOXAZOSIN 4 MG: 2 TABLET ORAL at 09:02

## 2025-02-28 RX ADMIN — APIXABAN 5 MG: 5 TABLET, FILM COATED ORAL at 09:02

## 2025-02-28 RX ADMIN — ALBUTEROL SULFATE 15 MG: 2.5 SOLUTION RESPIRATORY (INHALATION) at 07:02

## 2025-02-28 NOTE — ED PROVIDER NOTES
Source of History:  Patient and chart review    Chief complaint:  Abnormal Lab (Had blood work done, K 6.4. Pt has no symptoms, dialysis T Th S, received full dialysis session yesterday)    HPI:  Ibrahima Phelps is a 69 y.o. male with a past medical history of atrial fibrillation on Eliquis, CAD, ESRD on HD (T/Th/Sat), failed DDKT x2, HTN, HLD and CHF (EF 60-65%) who presents at the direction of vascular surgeon secondary to a potassium of 6.4 on outpatient labs.  Patient reports full dialysis session yesterday.  Patient reports still making urine with 1 void every day to every other day. Patient reports feeling well with no recent fevers/chills, nausea/vomiting, diarrhea, shortness of breath, palpitations, dysuria, and chest pain    Review of patient's allergies indicates:   Allergen Reactions    Ace inhibitors Swelling    Verapamil Other (See Comments)     Other reaction(s): Unknown    Povidone-iodine Itching     Medications Ordered Prior to Encounter[1]    PMH:  As per HPI and below:  Past Medical History:   Diagnosis Date    Atrial fibrillation     CAD (coronary artery disease) 2006    MI in 2006    CHF (congestive heart failure) 2017    CKD (chronic kidney disease) stage 3, GFR 30-59 ml/min     Dr. Latif.  in transplanted kidney    CKD (chronic kidney disease) stage 5, GFR less than 15 ml/min 02/02/2024    COVID-19 02/07/2023    Diverticulosis     Hyperlipidemia     Hypertension     Keloid cicatrix     Metabolic bone disease     Other emphysema 10/01/2019    Pericarditis     S/P kidney transplant 1992    x2 (1992 & 2005),    Thrombocytopenia     Thyroid disease     Tobacco use 09/05/2014     Past Surgical History:   Procedure Laterality Date    AV FISTULA PLACEMENT Left 12/18/2023    Procedure: CREATION, AV FISTULA;  Surgeon: JUANI Melendez III, MD;  Location: [facility] OR [address] [place] [address];  Service: Vascular;  Laterality: Left;  LUE AVF creation vs AVG insertion    CARDIOVERSION  04/30/15    CHOLECYSTECTOMY       "COLONOSCOPY  April 20, 2011    Diverticulosis, repeat recommended in 3 yrs., repeat colonoscopy 2014 revealed 2 polyps.  He should return in 5 years.    COLONOSCOPY N/A 3/13/2020    Procedure: COLONOSCOPY;  Surgeon: Chon Casper MD;  Location: [facility] (4TH FLR);  Service: Endoscopy;  Laterality: N/A;  ok to hold coumadin x5days-see telephone encounter 2/4/20-tb    COLONOSCOPY N/A 2/4/2021    Procedure: COLONOSCOPY;  Surgeon: Chon Casper MD;  Location: [facility] (4TH FLR);  Service: Endoscopy;  Laterality: N/A;  Eliquis - per Dr. GAVIN Blunt ok to hold x 2 days and "restarted asap"- ERW  last prep "poor", [id number] ordered/ Pt requests Dr. Casepr  prep ins. mailed - COVID screening on 2/1/21 PCW- ERW    COLONOSCOPY N/A 3/3/2021    Procedure: COLONOSCOPY;  Surgeon: Chon Casper MD;  Location: [facility] (4TH FLR);  Service: Endoscopy;  Laterality: N/A;  Patient with his second poor bowel pre and has poor prep today.  If patient not intersted or can't get colonoscopy tomorrow will need constipation bowel prep and will need to restart his Eliquis today.Thanks,Chon     per Dr Blunt-ok to hold Eliquis 2 days prior      COVID     COLONOSCOPY N/A 12/6/2024    Procedure: COLONOSCOPY;  Surgeon: Chon Casper MD;  Location: [facility] (2ND FLR);  Service: Endoscopy;  Laterality: N/A;  Plavix-Eliquis pending/ HD T-Th-Sat- lab ordered  2/25/24 ECHO PA 49  ref by / [facility] inst portal-RN  11/29-message to clearance nurse for plavix and eliquis hold-tb  ok to hold Eliquis 2 days per Dr Agata chua to hold Plavix 5 days per MEENA CUNNINGHAM-GT  12/2- p    CORONARY ANGIOGRAPHY N/A 2/28/2024    Procedure: ANGIOGRAM, CORONARY ARTERY;  Surgeon: Tylor Lincoln MD;  Location: [facility] CATH LAB;  Service: Cardiology;  Laterality: N/A;    CORONARY ANGIOPLASTY WITH STENT PLACEMENT  9/13/2006    FISTULOGRAM N/A 2/19/2024    Procedure: Fistulogram;  Surgeon: JUANI Melendez III, MD;  Location: [facility] CATH LAB;  Service: " Peripheral Vascular;  Laterality: N/A;    FISTULOGRAM, WITH PTA Left 6/3/2024    Procedure: FISTULOGRAM, WITH PTA;  Surgeon: JUANI Melendez III, MD;  [facility];  Service: Vascular;  Laterality: Left;  mGy: 51.58  GyCm2: 6.8285  Fluoro time: 5.1 min  Contrast: 28 cc    IRRIGATION AND DEBRIDEMENT Right 8/30/2023    Procedure: IRRIGATION AND DEBRIDEMENT, RIGHT MIDDLE FINGER;  Surgeon: Martin Larsen MD;  [address]  Service: Orthopedics;  Laterality: Right;    KIDNEY TRANSPLANT  2005    LEFT HEART CATHETERIZATION N/A 2/26/2024    Procedure: Left heart cath;  Surgeon: Tylor Lincoln MD;  Location: [facility] CATH LAB;  Service: Cardiology;  Laterality: N/A;    PARATHYROIDECTOMY      PERCUTANEOUS TRANSLUMINAL ANGIOPLASTY OF ARTERIOVENOUS FISTULA Left 2/19/2024    Procedure: PTA, AV FISTULA;  Surgeon: JUANI Melendez III, MD;  Location: [facility] CATH LAB;  Service: Peripheral Vascular;  Laterality: Left;    STENT, DRUG ELUTING, SINGLE VESSEL, CORONARY N/A 2/28/2024    Procedure: Stent, Drug Eluting, Single Vessel, Coronary;  Surgeon: Tylor Lincoln MD;  Location: [facility] CATH LAB;  Service: Cardiology;  Laterality: N/A;    TREATMENT OF CARDIAC ARRHYTHMIA N/A 3/28/2022    Procedure: CARDIOVERSION;  Surgeon: Migue Blunt MD;  Location: [facility] EP LAB;  Service: Cardiology;  Laterality: N/A;  AF, DCC, MAC, GP, 3 PREP*RODRIGO deferred pt 100% compliant*    VENOPLASTY  6/3/2024    Procedure: ANGIOPLASTY, VEIN;  Surgeon: JUANI Melendez III, MD;  Location: [facility] OR [address] [place] [address];  Service: Vascular;;  Left subclavian       Social History     Socioeconomic History    Marital status:    Occupational History    Occupation:    Tobacco Use    Smoking status: Every Day     Current packs/day: 0.00     Average packs/day: 0.5 packs/day for 40.0 years (20.0 ttl pk-yrs)     Types: Cigarettes     Start date: 2/28/1982     Last attempt to quit: 2/28/2022     Years since quitting: 3.0     Smokeless tobacco: Never    Tobacco comments:     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     Patient is currently retired   Substance and Sexual Activity    Alcohol use: No    Drug use: No    Sexual activity: Yes     Partners: Female   Social History Narrative    Retired      Social Drivers of Health     Financial Resource Strain: Low Risk  (12/13/2024)    Overall Financial Resource Strain (CARDIA)     Difficulty of Paying Living Expenses: Not hard at all   Food Insecurity: No Food Insecurity (12/13/2024)    Hunger Vital Sign     Worried About Running Out of Food in the Last Year: Never true     Ran Out of Food in the Last Year: Never true   Transportation Needs: No Transportation Needs (12/6/2024)    TRANSPORTATION NEEDS     Transportation : No   Physical Activity: Inactive (12/13/2024)    Exercise Vital Sign     Days of Exercise per Week: 0 days     Minutes of Exercise per Session: 0 min   Stress: No Stress Concern Present (12/13/2024)    [employer] [place] [employer] - Occupational Stress Questionnaire     Feeling of Stress : Not at all   Recent Concern: Stress - Stress Concern Present (12/6/2024)    [employer] [place] [employer] - Occupational Stress Questionnaire     Feeling of Stress : To some extent   Housing Stability: Unknown (12/13/2024)    Housing Stability Vital Sign     Unable to Pay for Housing in the Last Year: No     Homeless in the Last Year: No       Family History   Problem Relation Name Age of Onset    No Known Problems Sister      No Known Problems Brother      Thyroid disease Mother          s/p surgery    Heart disease Father          had pacemaker    No Known Problems Sister      Kidney failure Sister      Kidney disease Sister      No Known Problems Sister      Kidney disease Brother      Kidney failure Brother          s/p transplant    Diabetes Mellitus Neg Hx      Stroke Neg Hx      Heart attack Neg Hx      Cancer  Neg Hx      Celiac disease Neg Hx      Cirrhosis Neg Hx      Colon cancer Neg Hx      Esophageal cancer Neg Hx      Inflammatory bowel disease Neg Hx      Rectal cancer Neg Hx      Stomach cancer Neg Hx      Ulcerative colitis Neg Hx      Liver disease Neg Hx      Liver cancer Neg Hx      Crohn's disease Neg Hx      Melanoma Neg Hx         Physical Exam:      Vitals:    03/01/25 0948   BP: (!) 140/78   Pulse: 62   Resp: 18   Temp:      Physical Exam    Gen: No acute distress  Mental Status:  Awake and alert, answering questions appropriately  Skin: Warm, dry. No rashes seen.  Eyes: No conjunctival injection.  Pulm: CTAB. No increased work of breathing.  No significant tachypnea.  No conversational dyspnea.    CV: RRR with no m/r/g  Abd: Soft. Not distended. Nontender to palpation. No guarding or rebound.   MSK: No deformities. WWP. LUE AVF with palpable thrill  Neuro: Awake. Speech normal. No focal neuro deficit observed.    Procedures  Laboratory Studies:  Labs Reviewed   CBC W/ AUTO DIFFERENTIAL - Abnormal       Result Value    WBC 9.60      RBC 5.85      Hemoglobin 15.1      Hematocrit 49.8      MCV 85      MCH 25.8 (*)     MCHC 30.3 (*)     RDW 16.3 (*)     Platelets 187      MPV 11.4      Immature Granulocytes 0.6 (*)     Gran # (ANC) 4.9      Immature Grans (Abs) 0.06 (*)     Lymph # 2.4      Mono # 1.2 (*)     Eos # 1.1 (*)     Baso # 0.05      nRBC 0      Gran % 50.5      Lymph % 24.7      Mono % 12.1      Eosinophil % 11.6 (*)     Basophil % 0.5      Differential Method Automated      Narrative:     Release to patient->Immediate   COMPREHENSIVE METABOLIC PANEL - Abnormal    Sodium 136      Potassium 6.2 (*)     Chloride 99      CO2 21 (*)     Glucose 94      BUN 39 (*)     Creatinine 9.1 (*)     Calcium 9.3      Total Protein 8.5 (*)     Albumin 3.3 (*)     Total Bilirubin 0.4      Alkaline Phosphatase 88      AST 19      ALT 24      eGFR 5.7 (*)     Anion Gap 16      Narrative:     Release to  patient->Immediate   PHOSPHORUS - Abnormal    Phosphorus 5.9 (*)     Narrative:     Release to patient->Immediate   COMPREHENSIVE METABOLIC PANEL - Abnormal    Sodium 135 (*)     Potassium 4.7      Chloride 106      CO2 16 (*)     Glucose 124 (*)     BUN 38 (*)     Creatinine 8.1 (*)     Calcium 15.4 (*)     Total Protein 6.6      Albumin 2.6 (*)     Total Bilirubin 0.3      Alkaline Phosphatase 67      AST 20      ALT 20      eGFR 6.6 (*)     Anion Gap 13     BASIC METABOLIC PANEL - Abnormal    Sodium 137      Potassium 5.4 (*)     Chloride 105      CO2 19 (*)     Glucose 87      BUN 42 (*)     Creatinine 8.6 (*)     Calcium 8.5 (*)     Anion Gap 13      eGFR 6.1 (*)    COMPREHENSIVE METABOLIC PANEL - Abnormal    Sodium 136      Potassium 5.5 (*)     Chloride 99      CO2 21 (*)     Glucose 99      BUN 47 (*)     Creatinine 10.1 (*)     Calcium 8.5 (*)     Total Protein 7.0      Albumin 2.8 (*)     Total Bilirubin 0.4      Alkaline Phosphatase 75      AST 16      ALT 20      eGFR 5.0 (*)     Anion Gap 16     CBC W/ AUTO DIFFERENTIAL - Abnormal    WBC 8.08      RBC 4.99      Hemoglobin 13.2 (*)     Hematocrit 41.8      MCV 84      MCH 26.5 (*)     MCHC 31.6 (*)     RDW 16.1 (*)     Platelets 150      MPV 12.7      Immature Granulocytes 0.6 (*)     Gran # (ANC) 4.3      Immature Grans (Abs) 0.05 (*)     Lymph # 1.8      Mono # 1.1 (*)     Eos # 0.9 (*)     Baso # 0.04      nRBC 0      Gran % 52.7      Lymph % 21.9      Mono % 13.0      Eosinophil % 11.3 (*)     Basophil % 0.5      Differential Method Automated     PHOSPHORUS - Abnormal    Phosphorus 7.0 (*)    ISTAT PROCEDURE - Abnormal    POC Glucose 102      POC BUN 45 (*)     POC Creatinine 10.2 (*)     POC Sodium 134 (*)     POC Potassium 5.9 (*)     POC Chloride 101      POC TCO2 (MEASURED) 25      POC Ionized Calcium 0.95 (*)     POC Hematocrit 52      Sample LYNDA     ISTAT PROCEDURE - Abnormal    POC Glucose 129 (*)     POC BUN 44 (*)     POC Creatinine 9.0 (*)      POC Sodium 135 (*)     POC Potassium 4.4      POC Chloride 108      POC TCO2 (MEASURED) 21 (*)     POC Ionized Calcium 1.84 (*)     POC Hematocrit 43      Sample LYNDA     MAGNESIUM    Magnesium 2.4      Narrative:     Release to patient->Immediate   HEPATITIS C ANTIBODY    Hepatitis C Ab Non-reactive      Narrative:     Release to patient->Immediate   HIV 1 / 2 ANTIBODY    HIV 1/2 Ag/Ab Non-reactive      Narrative:     Release to patient->Immediate   MAGNESIUM    Magnesium 2.3     POCT GLUCOSE    POCT Glucose 93     POCT GLUCOSE    POCT Glucose 85         Imaging Results              X-Ray Chest AP Portable (Final result)  Result time 02/28/25 19:21:04      Final result by Dylan Garnett MD (02/28/25 19:21:04)                   Impression:      No acute intrathoracic process.      Electronically signed by: Dylan Garnett MD  Date:    02/28/2025  Time:    19:21               Narrative:    EXAMINATION:  XR CHEST AP PORTABLE    CLINICAL HISTORY:  ESRD;    TECHNIQUE:  Single frontal view of the chest was performed.    COMPARISON:  12/06/2024.    FINDINGS:  Monitoring EKG leads are present.  The trachea is unremarkable.  There are calcifications of the aortic knob.  The cardiomediastinal silhouette is within normal limits.  There is no evidence of free air beneath hemidiaphragms.  There are no pleural effusions.  There is no evidence of a pneumothorax.  No airspace opacity is present.    There are degenerative changes in the osseous structures.  There are calcifications in the neck vessels.                                      Medications Given:  Medications   sodium chloride 0.9% flush 10 mL (has no administration in time range)   melatonin tablet 6 mg (has no administration in time range)   acetaminophen tablet 500 mg (has no administration in time range)   albuterol-ipratropium 2.5 mg-0.5 mg/3 mL nebulizer solution 3 mL (has no administration in time range)   amiodarone tablet 200 mg (200 mg Oral Given 3/1/25 1961)    atorvastatin tablet 80 mg (80 mg Oral Given 3/1/25 0836)   clopidogreL tablet 75 mg (75 mg Oral Given 3/1/25 0836)   doxazosin tablet 4 mg (4 mg Oral Given 3/1/25 0836)   apixaban tablet 5 mg (5 mg Oral Given 3/1/25 0836)   famotidine tablet 20 mg (20 mg Oral Given 3/1/25 0836)   cetirizine tablet 10 mg (10 mg Oral Given 3/1/25 0836)   fluticasone furoate-vilanteroL 100-25 mcg/dose diskus inhaler 1 puff (0 puffs Inhalation Hold 3/1/25 0900)   fluticasone propionate 50 mcg/actuation nasal spray 50 mcg (0 mcg Each Nostril Hold 3/1/25 0800)   gabapentin capsule 300 mg (300 mg Oral Given 2/28/25 2154)   ipratropium 42 mcg (0.06 %) nasal spray 1 spray (1 spray Each Nostril Not Given 3/1/25 0900)   multivitamin tablet (1 tablet Oral Given 3/1/25 0836)   sodium chloride 0.9% flush 10 mL (has no administration in time range)   naloxone 0.4 mg/mL injection 0.02 mg (has no administration in time range)   glucose chewable tablet 16 g (has no administration in time range)   glucose chewable tablet 24 g (has no administration in time range)   dextrose 50% injection 12.5 g (has no administration in time range)   dextrose 50% injection 25 g (has no administration in time range)   glucagon (human recombinant) injection 1 mg (has no administration in time range)   ondansetron injection 4 mg (has no administration in time range)   sodium zirconium cyclosilicate packet 10 g (10 g Oral Given 3/1/25 0836)   torsemide tablet 40 mg (40 mg Oral Given 3/1/25 0844)   albuterol sulfate nebulizer solution 15 mg (15 mg Nebulization Given 2/28/25 1907)   insulin regular injection 5 Units 0.05 mL (5 Units Intravenous Given 2/28/25 1907)   calcium gluconate 1 g in NS IVPB (premixed) (0 g Intravenous Stopped 2/28/25 1955)   dextrose 50% injection 25 g (25 g Intravenous Given 2/28/25 1901)   furosemide injection 120 mg (120 mg Intravenous Given 2/28/25 2012)       ED Course as of 03/01/25 1140   Fri Feb 28, 2025   1844 Potassium(!):  6.2  Hyperkalemia, no visible hemolysis.  Ordered for shifting medication. [AB]      ED Course User Index  [AB] Nato Portillo MD       Discussed with attending physician Dr. Portillo    MDM:    Ibrahima Phelps is a 69 y.o. male with above medical history who presents at the direction of his vascular surgeon secondary to hyperkalemia.  Patient is afebrile, hemodynamically stable, and in no acute distress on arrival. Exam significant for well-appearing man in no acute distress, benign cardiac, pulmonary, and abdominal exam.  Left upper extremity AVF with palpable thrill.      Differential diagnoses considered include, but not limited to:  Insufficient dialysis secondary to fistula stenosis, worsening kidney function, lab abnormality, hemolysis    EKG per my interpretation demonstrates normal sinus rhythm, ventricular rate 79, normal axis, normal intervals.  No STEMI.  Labs significant for potassium 6.2 on presentation, labs otherwise reassuring  Chest x-ray, per my interpretation, demonstrates no significant pleural effusion or pneumothorax.    Patient shifted upon initial labs demonstrating hyperkalemia.  In discussing with hospital medicine and patient's nephrologist, patient treated with Lasix and admitted to Hospital Medicine for observation.  This decision was made in conjunction with the patient and his wife.  Risk, benefits, and alternatives to admission were discussed.     Medical Decision Making  Amount and/or Complexity of Data Reviewed  External Data Reviewed: notes.     Details: Vascular Note from 2/21:  PLAN:  Antegrade left fistulogram and intervention Wednesday March 5, 2025  PLAN:  Antegrade left fistulogram and intervention Wednesday March 5, 2025  Labs: ordered. Decision-making details documented in ED Course.  Radiology: ordered and independent interpretation performed.     Details: No acute findings on CXR  ECG/medicine tests: ordered and independent interpretation performed.    Risk  OTC  drugs.  Prescription drug management.           Clinical Impression:  Hyperkalemia   ESRD   ED Disposition Condition    Observation Stable               Patient and/or family understands the plan and is in agreement, verbalized understanding, questions answered    Michael Camp MD  Resident  Emergency Medicine        Attending Attestation:   Physician Attestation Statement for Resident:  As the supervising MD   Physician Attestation Statement: I have personally seen and examined this patient.   I agree with the above history.  -: Emergent evaluation of a 68 yo male referred to the ED by Vascular for hyperkalemia on outpatient labs.   Denies acute complaints, last dialysis yesterday, still makes some urine.   No chest pain or shortness of breath.    As the supervising MD I agree with the above PE.   -: Afebrile, no distress  LUE fistula with thrill, no overlying erythema  No LE edema    As the supervising MD I agree with the above treatment, course, plan, and disposition.   -: EKG on my independent interpretation:  Rate 80, no STEMI, SR with PACs, nonspecific ST-TWA  Repeat EKG at 1713 no STEMI  Compared to prior EKG 12/6/2024.    Placed on cardiac monitoring. Labs returned with hyperkalemia, ordered for calcium and shifting medication.   Case discussed with Hospital Medicine and nephrology - nephrology attending recommending 120 mg lasix and admit given fistula stenosis, plan for dialysis tomorrow.   Patient is agreeable to admission.    I have reviewed and agree with the residents interpretation of the following: lab data, x-rays and EKG.  I have reviewed the following: old records at this facility.        Attending Critical Care:   Critical Care Times:   Direct Patient Care (initial evaluation, reassessments, and time considering the case)................................................................14 minutes.   Additional History from reviewing old medical records or taking additional history from  the family, EMS, PCP, etc.......................5 minutes.   Ordering, Reviewing, and Interpreting Diagnostic Studies...............................................................................................................6 minutes.   Documentation..................................................................................................................................................................................5 minutes.   Consultation with other Physicians. .................................................................................................................................................5 minutes.   ==============================================================  Total Critical Care Time - exclusive of procedural time: 35 minutes.  ==============================================================  Critical care reasons: Hyperkalemia.   Critical care was time spent personally by me on the following activities: obtaining history from patient or relative, examination of patient, review of old charts, ordering lab, x-rays, and/or EKG, development of treatment plan with patient or relative, evaluation of patient's response to treatment, ordering and performing treatments and interventions and discussion with consultants.   Critical Care Condition: potentially life-threatening                                Michael Camp MD  Resident  03/01/25 0014         [1]   No current facility-administered medications on file prior to encounter.     Current Outpatient Medications on File Prior to Encounter   Medication Sig Dispense Refill    amiodarone (PACERONE) 200 MG Tab TAKE 1 TABLET BY MOUTH EVERY DAY 90 tablet 3    clopidogreL (PLAVIX) 75 mg tablet Take 1 tablet (75 mg total) by mouth once daily. 90 tablet 3    ELIQUIS 5 mg Tab TAKE 1 TABLET BY MOUTH TWICE A DAY 60 tablet 32    acetaminophen (TYLENOL) 500 MG tablet Take 1 tablet (500 mg total) by mouth every 6 (six) hours as needed for  Pain. 20 tablet 0    albuterol (VENTOLIN HFA) 90 mcg/actuation inhaler Inhale 2 puffs into the lungs every 6 (six) hours as needed for Wheezing or Shortness of Breath. Rescue 18 g 3    albuterol-ipratropium (DUO-NEB) 2.5 mg-0.5 mg/3 mL nebulizer solution Take 3 mLs by nebulization every 4 (four) hours as needed for Wheezing. Rescue 75 mL 0    atorvastatin (LIPITOR) 80 MG tablet Take 1 tablet (80 mg total) by mouth once daily. 90 tablet 1    b complex vitamins (B COMPLEX-VITAMIN B12) tablet Take 1 tablet by mouth once daily. 90 tablet 3    calcium carbonate (CALCIUM 600 ORAL) Take 1 tablet by mouth 2 (two) times a day.      doxazosin (CARDURA) 4 MG tablet Take 1 tablet (4 mg total) by mouth every 12 (twelve) hours. 180 tablet 3    famotidine (PEPCID) 20 MG tablet TAKE 1 TABLET (20 MG TOTAL) BY MOUTH ONCE DAILY. ON DIALYSIS DAYS, PLEASE TAKE THIS MEDICINE AFTER DIALYSIS. 90 tablet 1    fexofenadine HCl (ALLEGRA ORAL) Take 1 tablet by mouth daily as needed (allergies).      fluticasone furoate-vilanteroL (BREO) 100-25 mcg/dose diskus inhaler Inhale 1 puff into the lungs once daily. Controller. Use this inhaler every day.      fluticasone propionate (FLONASE) 50 mcg/actuation nasal spray 1 spray (50 mcg total) by Each Nostril route daily as needed for Allergies.      gabapentin (NEURONTIN) 300 MG capsule Take 1 capsule (300 mg total) by mouth every evening. 90 capsule 3    ipratropium (ATROVENT) 21 mcg (0.03 %) nasal spray 2 sprays by Each Nostril route 3 (three) times daily. 30 mL 0    multivit-min-FA-lycopen-lutein (CENTRUM SILVER) 0.4 mg-300 mcg- 250 mcg Tab Take 1 tablet by mouth once daily.      multivitamin (THERAGRAN) per tablet Take 1 tablet by mouth Daily.      nitroGLYCERIN (NITROSTAT) 0.4 MG SL tablet Place 1 tablet (0.4 mg total) under the tongue every 5 (five) minutes as needed for Chest pain. 25 tablet 0    omeprazole (PRILOSEC) 20 MG capsule Take 1 capsule (20 mg total) by mouth daily as needed  (heartburn).      pulse oximeter (PULSE OXIMETER) device by Apply Externally route 2 (two) times a day. Use twice daily at 8 AM and 3 PM and record the value in [facility] as directed. 1 each 0    tacrolimus (PROGRAF) 1 MG Cap Take 3 capsules (3 mg total) by mouth every 12 (twelve) hours for 60 days, THEN 2 capsules (2 mg total) every 12 (twelve) hours for 60 days, THEN 1 capsule (1 mg total) every 12 (twelve) hours for 60 days, THEN 1 capsule (1 mg total) once daily. 540 capsule 4    torsemide (DEMADEX) 20 MG Tab Take 2 tablets (40 mg total) by mouth 3 (three) times a week. (Patient taking differently: Take 40 mg by mouth 3 (three) times a week. Pt reports taking 40 mg BID) 90 tablet 3    [DISCONTINUED] CHOLECALCIFEROL, VITAMIN D3, ORAL Take 2 tablets by mouth 2 (two) times daily.      [DISCONTINUED] metoprolol succinate (TOPROL-XL) 25 MG 24 hr tablet Take 0.5 tablets (12.5 mg total) by mouth once daily. (Patient not taking: Reported on 2/28/2025) 45 tablet 3    [DISCONTINUED] NIFEdipine (PROCARDIA-XL) 30 MG (OSM) 24 hr tablet Take 1 tablet (30 mg total) by mouth 2 (two) times a day. (Patient not taking: Reported on 2/28/2025) 180 tablet 3    [DISCONTINUED] predniSONE (DELTASONE) 5 MG tablet Take 1 tablet (5 mg total) by mouth once daily. (Patient not taking: Reported on 2/28/2025) 90 tablet 3    [DISCONTINUED] traMADoL (ULTRAM) 50 mg tablet Take 1 tablet (50 mg total) by mouth every 6 (six) hours as needed for Pain. 20 tablet 0        Nato Portillo MD  03/01/25 [id number]

## 2025-02-28 NOTE — FIRST PROVIDER EVALUATION
"Medical screening examination initiated.  I have conducted a focused provider triage encounter, findings are as follows:    Brief history of present illness:  Patient is a HS patient referred to the ED for evaluation of hyperK to 6.4 despite HD. He notes his phos has also been elevated. Patient is asymptomatic     Vitals:    02/28/25 1610   BP: (!) 185/95   BP Location: Right arm   Patient Position: Sitting   Pulse: 87   Resp: 20   Temp: 98 °F (36.7 °C)   TempSrc: Oral   SpO2: 96%   Weight: 84.8 kg (187 lb)   Height: 6' 1" (1.854 m)       Pertinent physical exam:  Lungs CTAB     Brief workup plan:  Labs, EKG  NSR @ 80 bpm, QRS 96, no ischemic ST/TW changes    Preliminary workup initiated; this workup will be continued and followed by the physician or advanced practice provider that is assigned to the patient when roomed.  "

## 2025-02-28 NOTE — PROGRESS NOTES
SUBJECTIVE     Chief Complaint   Patient presents with    Follow-up    Allergies       HPI  Ibrahima Phelps is a 69 y.o. male with  TN, HLD, CAD, pAFib, HFpEF, pulmonary HTN, ESRD on HD T/Th/S with anemia of chronic renal disease & secondary hyperparathyroidism, h/o failed kidney transplant, on immunosuppressants, polyneuropathy, benign essential tremor  that presents for follow-up. LOV 12/13/24.     ALLERGIES AND ENT:  He reports seasonal allergies with nasal drainage anteriorly and congestion that worsens at nighttime. He currently takes Allegra for symptom management and denies post-nasal drip. He also reports having a serious cold with nasal congestion.    BLOOD PRESSURE:  Home blood pressure readings are usually in the range of 120-130. He sometimes experiences fatigue associated with low blood pressure episodes; however, he is asymptomatic today. He takes torsemide 3 times per week and doxazosin as needed for elevated blood pressure.Took Torsemide today. Has not been taking Nifedipine and Metoprolol due to lower blood pressures.     SURGICAL HISTORY:  He is scheduled for surgery for stenosis of his AV fistula.         PAST MEDICAL HISTORY:  Past Medical History:   Diagnosis Date    Atrial fibrillation     CAD (coronary artery disease) 2006    MI in 2006    CHF (congestive heart failure) 2017    CKD (chronic kidney disease) stage 3, GFR 30-59 ml/min     Dr. Latif.  in transplanted kidney    CKD (chronic kidney disease) stage 5, GFR less than 15 ml/min 02/02/2024    COVID-19 02/07/2023    Diverticulosis     Hyperlipidemia     Hypertension     Keloid cicatrix     Metabolic bone disease     Other emphysema 10/01/2019    Pericarditis     S/P kidney transplant 1992    x2 (1992 & 2005),    Thrombocytopenia     Thyroid disease     Tobacco use 09/05/2014       PAST SURGICAL HISTORY:  Past Surgical History:   Procedure Laterality Date    AV FISTULA PLACEMENT Left 12/18/2023    Procedure: CREATION, AV FISTULA;  Surgeon:  "JUANI Melendez III, MD;  Location: [facility] OR 2ND FLR;  Service: Vascular;  Laterality: Left;  LUE AVF creation vs AVG insertion    CARDIOVERSION  04/30/15    CHOLECYSTECTOMY      COLONOSCOPY  April 20, 2011    Diverticulosis, repeat recommended in 3 yrs., repeat colonoscopy 2014 revealed 2 polyps.  He should return in 5 years.    COLONOSCOPY N/A 3/13/2020    Procedure: COLONOSCOPY;  Surgeon: Chon Casper MD;  Location: [facility] (4TH FLR);  Service: Endoscopy;  Laterality: N/A;  ok to hold coumadin x5days-see telephone encounter 2/4/20-tb    COLONOSCOPY N/A 2/4/2021    Procedure: COLONOSCOPY;  Surgeon: Chon Casper MD;  Location: [facility] (4TH FLR);  Service: Endoscopy;  Laterality: N/A;  Eliquis - per Dr. GAVIN Blunt ok to hold x 2 days and "restarted asap"- ERW  last prep "poor", [id number] ordered/ Pt requests Dr. Casper  prep ins. mailed - COVID screening on 2/1/21 PCW- ERW    COLONOSCOPY N/A 3/3/2021    Procedure: COLONOSCOPY;  Surgeon: Chon Casper MD;  Location: [facility] (4TH FLR);  Service: Endoscopy;  Laterality: N/A;  Patient with his second poor bowel pre and has poor prep today.  If patient not intersted or can't get colonoscopy tomorrow will need constipation bowel prep and will need to restart his Eliquis today.Thanks,Chon     per Dr Blunt-ok to hold Eliquis 2 days prior      COVID     COLONOSCOPY N/A 12/6/2024    Procedure: COLONOSCOPY;  Surgeon: Chon Casper MD;  Location: [facility] (2ND FLR);  Service: Endoscopy;  Laterality: N/A;  Plavix-Eliquis pending/ HD T-Th-Sat- lab ordered  2/25/24 ECHO PA 49  ref by / [facility] inst portal-RN  11/29-message to clearance nurse for plavix and eliquis hold-tb  ok to hold Eliquis 2 days per Dr Parmar  ok to hold Plavix 5 days per MEENA CUNNINGHAM-GT  12/2- p    CORONARY ANGIOGRAPHY N/A 2/28/2024    Procedure: ANGIOGRAM, CORONARY ARTERY;  Surgeon: Tylor Lincoln MD;  Location: [facility] CATH LAB;  Service: Cardiology;  Laterality: N/A;    " CORONARY ANGIOPLASTY WITH STENT PLACEMENT  9/13/2006    FISTULOGRAM N/A 2/19/2024    Procedure: Fistulogram;  Surgeon: JUANI Melendez III, MD;  Location: [facility] CATH LAB;  Service: Peripheral Vascular;  Laterality: N/A;    FISTULOGRAM, WITH PTA Left 6/3/2024    Procedure: FISTULOGRAM, WITH PTA;  Surgeon: JUANI Melendez III, MD;  [facility];  Service: Vascular;  Laterality: Left;  mGy: 51.58  GyCm2: 6.8285  Fluoro time: 5.1 min  Contrast: 28 cc    IRRIGATION AND DEBRIDEMENT Right 8/30/2023    Procedure: IRRIGATION AND DEBRIDEMENT, RIGHT MIDDLE FINGER;  Surgeon: Martin Larsen MD;  [address]  Service: Orthopedics;  Laterality: Right;    KIDNEY TRANSPLANT  2005    LEFT HEART CATHETERIZATION N/A 2/26/2024    Procedure: Left heart cath;  Surgeon: Tylor Lincoln MD;  Location: [facility] CATH LAB;  Service: Cardiology;  Laterality: N/A;    PARATHYROIDECTOMY      PERCUTANEOUS TRANSLUMINAL ANGIOPLASTY OF ARTERIOVENOUS FISTULA Left 2/19/2024    Procedure: PTA, AV FISTULA;  Surgeon: JUANI Melendez III, MD;  Location: [facility] CATH LAB;  Service: Peripheral Vascular;  Laterality: Left;    STENT, DRUG ELUTING, SINGLE VESSEL, CORONARY N/A 2/28/2024    Procedure: Stent, Drug Eluting, Single Vessel, Coronary;  Surgeon: Tylor Lincoln MD;  Location: [facility] CATH LAB;  Service: Cardiology;  Laterality: N/A;    TREATMENT OF CARDIAC ARRHYTHMIA N/A 3/28/2022    Procedure: CARDIOVERSION;  Surgeon: Migue Blunt MD;  Location: [facility] EP LAB;  Service: Cardiology;  Laterality: N/A;  AF, DCC, MAC, GP, 3 PREP*RODRIGO deferred pt 100% compliant*    VENOPLASTY  6/3/2024    Procedure: ANGIOPLASTY, VEIN;  Surgeon: JUANI Melendez III, MD;  [facility];  Service: Vascular;;  Left subclavian       FAMILY HISTORY:  Family History   Problem Relation Name Age of Onset    No Known Problems Sister      No Known Problems Brother      Thyroid disease Mother          s/p surgery    Heart disease Father           had pacemaker    No Known Problems Sister      Kidney failure Sister      Kidney disease Sister      No Known Problems Sister      Kidney disease Brother      Kidney failure Brother          s/p transplant    Diabetes Mellitus Neg Hx      Stroke Neg Hx      Heart attack Neg Hx      Cancer Neg Hx      Celiac disease Neg Hx      Cirrhosis Neg Hx      Colon cancer Neg Hx      Esophageal cancer Neg Hx      Inflammatory bowel disease Neg Hx      Rectal cancer Neg Hx      Stomach cancer Neg Hx      Ulcerative colitis Neg Hx      Liver disease Neg Hx      Liver cancer Neg Hx      Crohn's disease Neg Hx      Melanoma Neg Hx         ALLERGIES AND MEDICATIONS: updated and reviewed.  Review of patient's allergies indicates:   Allergen Reactions    Ace inhibitors Swelling    Verapamil Other (See Comments)     Other reaction(s): Unknown    Povidone-iodine Itching     Current Medications[1]    ROS  Review of Systems   Constitutional:  Negative for activity change, chills and fever.   HENT:  Positive for congestion, postnasal drip and rhinorrhea. Negative for hearing loss.    Eyes:  Negative for pain and visual disturbance.   Respiratory:  Negative for cough and shortness of breath.    Cardiovascular:  Negative for chest pain and palpitations.   Gastrointestinal:  Negative for abdominal pain, constipation, diarrhea, nausea and vomiting.   Endocrine: Negative.    Genitourinary: Negative.    Musculoskeletal:  Negative for arthralgias and myalgias.   Skin: Negative.    Allergic/Immunologic: Negative.    Neurological:  Negative for dizziness, light-headedness and headaches.   Hematological: Negative.          OBJECTIVE     Physical Exam  Vitals:    02/28/25 0845   BP: (!) 96/47   Pulse: 98    Body mass index is 24.72 kg/m².            Physical Exam  Vitals reviewed.   Constitutional:       General: He is not in acute distress.     Appearance: Normal appearance.   HENT:      Head: Normocephalic and atraumatic.       Mouth/Throat:      Mouth: Mucous membranes are moist.      Pharynx: Oropharynx is clear.   Eyes:      Extraocular Movements: Extraocular movements intact.      Conjunctiva/sclera: Conjunctivae normal.      Pupils: Pupils are equal, round, and reactive to light.   Cardiovascular:      Rate and Rhythm: Normal rate and regular rhythm.      Pulses: Normal pulses.      Heart sounds: Normal heart sounds.   Pulmonary:      Effort: Pulmonary effort is normal.      Breath sounds: Normal breath sounds.   Abdominal:      General: There is no distension.   Musculoskeletal:         General: Normal range of motion.      Cervical back: Normal range of motion and neck supple.   Skin:     General: Skin is warm and dry.   Neurological:      General: No focal deficit present.      Mental Status: He is alert.           ASSESSMENT     69 y.o. male with     1. Hypotension, unspecified hypotension type    2. Primary hypertension    3. ESRD (end stage renal disease) on dialysis    4. Mixed hyperlipidemia    5. Coronary artery disease involving native coronary artery of native heart without angina pectoris    6. Chronic obstructive pulmonary disease, unspecified COPD type    7. 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 Paroxysmal atrial fibrillation    9. Post-nasal drip        PLAN:     1. Hypotension, unspecified hypotension type  - Hypotensive in clinic today, but asymptomatic. Reporting normotensive ranges on home BP cuff. Continue holding Nifedipine, Doxazosin, Metoprolol. May need adjust dose of Torsemide. Will reach out to cardiologist for input.     2. Primary hypertension  - As per #1.     3. ESRD (end stage renal disease) on dialysis  - Stable. Continue follow up with Nephrology.     4. Mixed hyperlipidemia  - Stable on atorvastatin. Continue.     5. Coronary artery disease involving native coronary artery of native heart without  angina pectoris  - Stable. Currently holding metoprolol due to lower blood pressures. Continue follow up with Cardiology.     6. Chronic obstructive pulmonary disease, unspecified COPD type  - Lungs CTAB today. Continue Breo inhaler controller.     7. 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     8. Paroxysmal atrial fibrillation  - RRR on exam today. Continue Amiodarone, Eliquis. Holding Metoprolol due to lower blood pressures.     9. Post-nasal drip  - Continue Allegra. Trial of atrovent nasal spray.   - ipratropium (ATROVENT) 21 mcg (0.03 %) nasal spray; 2 sprays by Each Nostril route 3 (three) times daily.  Dispense: 30 mL; Refill: 0    Visit today included increased complexity associated with the care of the episodic problem addressed and managing the longitudinal care of the patient due to the serious and/or complex managed problem(s).    RTC in 6 months, earlier PRN       Anatoliy Colindres MD  Family Medicine  [facility]  02/28/2025    This document was created using voice recognition software ("[employer] Fluency Direct). Although it may be edited, this document may contain errors related to incorrect recognition of the spoken word. Please call the physician if clarification is needed.       No follow-ups on file.                       [1]   Current Outpatient Medications   Medication Sig Dispense Refill    acetaminophen (TYLENOL) 500 MG tablet Take 1 tablet (500 mg total) by mouth every 6 (six) hours as needed for Pain. 20 tablet 0    albuterol (VENTOLIN HFA) 90 mcg/actuation inhaler Inhale 2 puffs into the lungs every 6 (six) hours as needed for Wheezing or Shortness of Breath. Rescue 18 g 3    albuterol-ipratropium (DUO-NEB) 2.5 mg-0.5 mg/3 mL nebulizer solution Take 3 mLs by nebulization every 4 (four) hours as  needed for Wheezing. Rescue 75 mL 0    amiodarone (PACERONE) 200 MG Tab TAKE 1 TABLET BY MOUTH EVERY DAY 90 tablet 3    atorvastatin (LIPITOR) 80 MG tablet Take 1 tablet (80 mg total) by mouth once daily. 90 tablet 3    b complex vitamins (B COMPLEX-VITAMIN B12) tablet Take 1 tablet by mouth once daily. 90 tablet 3    calcium carbonate (CALCIUM 600 ORAL) Take 1 tablet by mouth 2 (two) times a day. (Patient not taking: Reported on 2/21/2025)      CHOLECALCIFEROL, VITAMIN D3, ORAL Take 2 tablets by mouth 2 (two) times daily.      clopidogreL (PLAVIX) 75 mg tablet Take 1 tablet (75 mg total) by mouth once daily. 90 tablet 3    doxazosin (CARDURA) 4 MG tablet Take 1 tablet (4 mg total) by mouth every 12 (twelve) hours. 180 tablet 3    ELIQUIS 5 mg Tab TAKE 1 TABLET BY MOUTH TWICE A DAY 60 tablet 32    famotidine (PEPCID) 20 MG tablet TAKE 1 TABLET (20 MG TOTAL) BY MOUTH ONCE DAILY. ON DIALYSIS DAYS, PLEASE TAKE THIS MEDICINE AFTER DIALYSIS. 90 tablet 1    fexofenadine HCl (ALLEGRA ORAL) Take 1 tablet by mouth daily as needed (allergies).      fluticasone furoate-vilanteroL (BREO) 100-25 mcg/dose diskus inhaler Inhale 1 puff into the lungs once daily. Controller. Use this inhaler every day. (Patient not taking: Reported on 2/21/2025)      fluticasone propionate (FLONASE) 50 mcg/actuation nasal spray 1 spray (50 mcg total) by Each Nostril route daily as needed for Allergies.      gabapentin (NEURONTIN) 300 MG capsule Take 1 capsule (300 mg total) by mouth every evening. 90 capsule 3    metoprolol succinate (TOPROL-XL) 25 MG 24 hr tablet Take 0.5 tablets (12.5 mg total) by mouth once daily. 45 tablet 3    multivit-min-FA-lycopen-lutein (CENTRUM SILVER) 0.4 mg-300 mcg- 250 mcg Tab Take 1 tablet by mouth once daily.      multivitamin (THERAGRAN) per tablet Take 1 tablet by mouth Daily. (Patient not taking: Reported on 2/21/2025)      NIFEdipine (PROCARDIA-XL) 30 MG (OSM) 24 hr tablet Take 1 tablet (30 mg total) by mouth 2  (two) times a day. 180 tablet 3    nitroGLYCERIN (NITROSTAT) 0.4 MG SL tablet Place 1 tablet (0.4 mg total) under the tongue every 5 (five) minutes as needed for Chest pain. (Patient not taking: Reported on 2/21/2025) 25 tablet 0    omeprazole (PRILOSEC) 20 MG capsule Take 1 capsule (20 mg total) by mouth daily as needed (heartburn).      predniSONE (DELTASONE) 5 MG tablet Take 1 tablet (5 mg total) by mouth once daily. (Patient not taking: Reported on 2/21/2025) 90 tablet 3    pulse oximeter (PULSE OXIMETER) device by Apply Externally route 2 (two) times a day. Use twice daily at 8 AM and 3 PM and record the value in [employer] as directed. 1 each 0    tacrolimus (PROGRAF) 1 MG Cap Take 3 capsules (3 mg total) by mouth every 12 (twelve) hours for 60 days, THEN 2 capsules (2 mg total) every 12 (twelve) hours for 60 days, THEN 1 capsule (1 mg total) every 12 (twelve) hours for 60 days, THEN 1 capsule (1 mg total) once daily. 540 capsule 4    torsemide (DEMADEX) 20 MG Tab Take 2 tablets (40 mg total) by mouth 3 (three) times a week. (Patient taking differently: Take 40 mg by mouth 3 (three) times a week. Pt reports taking 40 mg BID) 90 tablet 3    traMADoL (ULTRAM) 50 mg tablet Take 1 tablet (50 mg total) by mouth every 6 (six) hours as needed for Pain. (Patient not taking: Reported on 2/21/2025) 20 tablet 0     No current facility-administered medications for this visit.

## 2025-02-28 NOTE — TELEPHONE ENCOUNTER
Refill Decision Note   Ibrahima Phelps  is requesting a refill authorization.  Brief Assessment and Rationale for Refill:  Approve     Medication Therapy Plan:        Comments:     Note composed:12:54 PM 02/28/2025

## 2025-02-28 NOTE — TELEPHONE ENCOUNTER
Per request of Dr. MADHAVI Donald nurse contacted pt's wife Shea with order to report to ED at [facility] now for evaluation due to critically high K+ of 6.4. Wife verbalized understanding and states she will have pt report to ED.

## 2025-02-28 NOTE — ED TRIAGE NOTES
Abnormal Lab (Had blood work done, K 6.4. Pt has no symptoms, dialysis T Th S, received full dialysis session yesterday). Pt reports upcoming surgery on Wednesday, his primary doctor encouraged him to come in.       Spoke with patient in regards to scheduling her Mammo screening & she refused.  She did schedule a f/u with Dr. Pelletier for October.

## 2025-02-28 NOTE — TELEPHONE ENCOUNTER
No care due was identified.  [facility] Embedded Care Due Messages. Reference number: [id number].   2/28/2025 12:07:58 PM CST

## 2025-03-01 VITALS
WEIGHT: 187 LBS | TEMPERATURE: 98 F | SYSTOLIC BLOOD PRESSURE: 140 MMHG | RESPIRATION RATE: 18 BRPM | OXYGEN SATURATION: 98 % | HEIGHT: 73 IN | DIASTOLIC BLOOD PRESSURE: 78 MMHG | HEART RATE: 62 BPM | BODY MASS INDEX: 24.78 KG/M2

## 2025-03-01 LAB
ALBUMIN SERPL BCP-MCNC: 2.8 G/DL (ref 3.5–5.2)
ALP SERPL-CCNC: 75 U/L (ref 40–150)
ALT SERPL W/O P-5'-P-CCNC: 20 U/L (ref 10–44)
ANION GAP SERPL CALC-SCNC: 16 MMOL/L (ref 8–16)
AST SERPL-CCNC: 16 U/L (ref 10–40)
BASOPHILS # BLD AUTO: 0.04 K/UL (ref 0–0.2)
BASOPHILS NFR BLD: 0.5 % (ref 0–1.9)
BILIRUB SERPL-MCNC: 0.4 MG/DL (ref 0.1–1)
BUN SERPL-MCNC: 47 MG/DL (ref 8–23)
CALCIUM SERPL-MCNC: 8.5 MG/DL (ref 8.7–10.5)
CHLORIDE SERPL-SCNC: 99 MMOL/L (ref 95–110)
CO2 SERPL-SCNC: 21 MMOL/L (ref 23–29)
CREAT SERPL-MCNC: 10.1 MG/DL (ref 0.5–1.4)
DIFFERENTIAL METHOD BLD: ABNORMAL
EOSINOPHIL # BLD AUTO: 0.9 K/UL (ref 0–0.5)
EOSINOPHIL NFR BLD: 11.3 % (ref 0–8)
ERYTHROCYTE [DISTWIDTH] IN BLOOD BY AUTOMATED COUNT: 16.1 % (ref 11.5–14.5)
EST. GFR  (NO RACE VARIABLE): 5 ML/MIN/1.73 M^2
GLUCOSE SERPL-MCNC: 99 MG/DL (ref 70–110)
HCT VFR BLD AUTO: 41.8 % (ref 40–54)
HGB BLD-MCNC: 13.2 G/DL (ref 14–18)
IMM GRANULOCYTES # BLD AUTO: 0.05 K/UL (ref 0–0.04)
IMM GRANULOCYTES NFR BLD AUTO: 0.6 % (ref 0–0.5)
LYMPHOCYTES # BLD AUTO: 1.8 K/UL (ref 1–4.8)
LYMPHOCYTES NFR BLD: 21.9 % (ref 18–48)
MAGNESIUM SERPL-MCNC: 2.3 MG/DL (ref 1.6–2.6)
MCH RBC QN AUTO: 26.5 PG (ref 27–31)
MCHC RBC AUTO-ENTMCNC: 31.6 G/DL (ref 32–36)
MCV RBC AUTO: 84 FL (ref 82–98)
MONOCYTES # BLD AUTO: 1.1 K/UL (ref 0.3–1)
MONOCYTES NFR BLD: 13 % (ref 4–15)
NEUTROPHILS # BLD AUTO: 4.3 K/UL (ref 1.8–7.7)
NEUTROPHILS NFR BLD: 52.7 % (ref 38–73)
NRBC BLD-RTO: 0 /100 WBC
OHS QRS DURATION: 96 MS
OHS QRS DURATION: 98 MS
OHS QTC CALCULATION: 422 MS
OHS QTC CALCULATION: 427 MS
PHOSPHATE SERPL-MCNC: 7 MG/DL (ref 2.7–4.5)
PLATELET # BLD AUTO: 150 K/UL (ref 150–450)
PMV BLD AUTO: 12.7 FL (ref 9.2–12.9)
POTASSIUM SERPL-SCNC: 5.5 MMOL/L (ref 3.5–5.1)
PROT SERPL-MCNC: 7 G/DL (ref 6–8.4)
RBC # BLD AUTO: 4.99 M/UL (ref 4.6–6.2)
SODIUM SERPL-SCNC: 136 MMOL/L (ref 136–145)
WBC # BLD AUTO: 8.08 K/UL (ref 3.9–12.7)

## 2025-03-01 PROCEDURE — 85025 COMPLETE CBC W/AUTO DIFF WBC: CPT | Mod: HCNC | Performed by: NURSE PRACTITIONER

## 2025-03-01 PROCEDURE — 84100 ASSAY OF PHOSPHORUS: CPT | Mod: HCNC | Performed by: NURSE PRACTITIONER

## 2025-03-01 PROCEDURE — G0378 HOSPITAL OBSERVATION PER HR: HCPCS | Mod: HCNC

## 2025-03-01 PROCEDURE — 25000003 PHARM REV CODE 250: Mod: HCNC | Performed by: NURSE PRACTITIONER

## 2025-03-01 PROCEDURE — 83735 ASSAY OF MAGNESIUM: CPT | Mod: HCNC | Performed by: NURSE PRACTITIONER

## 2025-03-01 PROCEDURE — 80053 COMPREHEN METABOLIC PANEL: CPT | Mod: HCNC | Performed by: NURSE PRACTITIONER

## 2025-03-01 RX ORDER — TORSEMIDE 10 MG/1
40 TABLET ORAL
Status: DISCONTINUED | OUTPATIENT
Start: 2025-03-01 | End: 2025-03-01 | Stop reason: HOSPADM

## 2025-03-01 RX ADMIN — SODIUM ZIRCONIUM CYCLOSILICATE 10 G: 5 POWDER, FOR SUSPENSION ORAL at 08:03

## 2025-03-01 RX ADMIN — ATORVASTATIN CALCIUM 80 MG: 40 TABLET, FILM COATED ORAL at 08:03

## 2025-03-01 RX ADMIN — CETIRIZINE HYDROCHLORIDE 10 MG: 5 TABLET, FILM COATED ORAL at 08:03

## 2025-03-01 RX ADMIN — TORSEMIDE 40 MG: 10 TABLET ORAL at 08:03

## 2025-03-01 RX ADMIN — THERA TABS 1 TABLET: TAB at 08:03

## 2025-03-01 RX ADMIN — AMIODARONE HYDROCHLORIDE 200 MG: 200 TABLET ORAL at 08:03

## 2025-03-01 RX ADMIN — DOXAZOSIN 4 MG: 2 TABLET ORAL at 08:03

## 2025-03-01 RX ADMIN — FAMOTIDINE 20 MG: 20 TABLET, FILM COATED ORAL at 08:03

## 2025-03-01 RX ADMIN — APIXABAN 5 MG: 5 TABLET, FILM COATED ORAL at 08:03

## 2025-03-01 RX ADMIN — CLOPIDOGREL BISULFATE 75 MG: 75 TABLET ORAL at 08:03

## 2025-03-01 NOTE — ASSESSMENT & PLAN NOTE
Anemia is likely due to chronic disease due to ESRD. Most recent hemoglobin and hematocrit are listed below.  Recent Labs     02/28/25  0836 02/28/25  1646 02/28/25  1650 02/28/25 2005 03/01/25  0452   HGB 16.0 15.1  --   --  13.2*   HCT 54.6* 49.8 52 43 41.8       Plan  - Monitor serial CBC: Daily  - Transfuse PRBC if patient becomes hemodynamically unstable, symptomatic or H/H drops below 7/21.  - Patient has not received any PRBC transfusions to date  - Patient's anemia is currently stable

## 2025-03-01 NOTE — ASSESSMENT & PLAN NOTE
Hyperkalemia is likely due to ESRD.The patients most recent potassium results are listed below.  Recent Labs     02/28/25  2006 02/28/25  2159 03/01/25  0452   K 4.7 5.4* 5.5*       Plan  - Monitor for arrhythmias with EKG and/or continuous telemetry.   - Treat the hyperkalemia with Potassium Binders, Calcium gluconate, IV insulin and dextrose, Nebulized albuterol sulfate, and Furosemide.   - Monitor potassium: Every 12 hours  - The patient's hyperkalemia is resolved  - Will continue lokelma tid, plan for HD tomorrow.

## 2025-03-01 NOTE — ASSESSMENT & PLAN NOTE
Long term (current) use of anticoagulants [V58.61]   Patient has paroxysmal (<7 days) atrial fibrillation. Patient is currently in sinus rhythm. [id number] Score: 2. The patients heart rate in the last 24 hours is as follows:  Pulse  Min: 61  Max: 98     Antiarrhythmics  amiodarone tablet 200 mg, Daily, Oral    Anticoagulants  apixaban tablet 5 mg, 2 times daily, Oral    Plan  - Replete lytes with a goal of K>4, Mg >2  - Patient is anticoagulated, see medications listed above.  - Patient's afib is currently controlled

## 2025-03-01 NOTE — ASSESSMENT & PLAN NOTE
Patient is failed kidney transplant 1992 and again in 2005, now ESRD on HD  -Reports he is no longer on prednisone and has been weaned off of prograf.

## 2025-03-01 NOTE — ASSESSMENT & PLAN NOTE
Hyperkalemia is likely due to ESRD.The patients most recent potassium results are listed below.  Recent Labs     02/28/25  0836 02/28/25  1646 02/28/25 2006   K 6.4* 6.2* 4.7     Plan  - Monitor for arrhythmias with EKG and/or continuous telemetry.   - Treat the hyperkalemia with Potassium Binders, Calcium gluconate, IV insulin and dextrose, Nebulized albuterol sulfate, and Furosemide.   - Monitor potassium: Every 12 hours  - The patient's hyperkalemia is resolved  - Will continue lokelma tid, plan for HD tomorrow.

## 2025-03-01 NOTE — ASSESSMENT & PLAN NOTE
Anemia is likely due to chronic disease due to ESRD. Most recent hemoglobin and hematocrit are listed below.  Recent Labs     02/28/25  0836 02/28/25  1646 02/28/25  1650 02/28/25 2005   HGB 16.0 15.1  --   --    HCT 54.6* 49.8 52 43     Plan  - Monitor serial CBC: Daily  - Transfuse PRBC if patient becomes hemodynamically unstable, symptomatic or H/H drops below 7/21.  - Patient has not received any PRBC transfusions to date  - Patient's anemia is currently stable

## 2025-03-01 NOTE — ASSESSMENT & PLAN NOTE
TThS schedule, last dialyzed Thursday  Residual renal function?- Yes    - Nephrology consulted  - Continue chronic hemodialysis  - Monitor daily electrolytes and defer dialysis orders to nephrology  - Renally dose medications  - Renal diet  - Continue phosphorus binders  - Daily weights/strict I&Os  - 1.5L FR

## 2025-03-01 NOTE — ASSESSMENT & PLAN NOTE
Patient is chronically on statin.will continue for now. Monitor clinically. Last LDL was   Lab Results   Component Value Date    LDLCALC 44.8 (L) 06/11/2024    LDLCALC 44.8 (L) 06/11/2024

## 2025-03-01 NOTE — H&P
[facility] - Emergency Dept  [facility] Medicine  History & Physical    Patient Name: Ibrahima Phelps  MRN: [id number]  Patient Class: OP- Observation  Admission Date: 2/28/2025  Attending Physician: Yamil Silva DO   Primary Care Provider: Anatoliy Colindres MD         Patient information was obtained from patient, past medical records, and ER records.     Subjective:     Principal Problem:Hyperkalemia    Chief Complaint:   Chief Complaint   Patient presents with    Abnormal Lab     Had blood work done, K 6.4. Pt has no symptoms, dialysis T Th S, received full dialysis session yesterday        HPI: Ibrahima Phelps is a 69 y.o. male with a PMHx of atrial fibrillation on Eliquis, CAD, ESRD on HD (T/Th/Sat), failed DDKT x2, HTN, HLD, COPD, and CHF (EF 60-65%) who presents at the direction of vascular surgeon secondary to a potassium of 6.4. Patient reports full dialysis session yesterday. He states he voids every other day. The patient denies CP, SOB, BLE edema, N/V/D, abdominal pain, fever, or dysuria. He notes chills x1 week.    In the ED, pt initially hypertensive which improved without treatment otherwise vitals stable, afebrile. CBC unremarkable. K+6.2>>4.7. Bicarb 21. Cr 9.1 (consistent with ESRD). Total protein 8.5. Albumin 3.3. Phos 5.9. EKG shows SR w/ PACs, 68 bpm, non specific ST and T wave abnormality. CXR with no acute intrathoracic process. The patient received albuterol neb, 1g calcium gluconate, D50, 5u regular insulin IV, and 120mg IV lasix.    Past Medical History:   Diagnosis Date    Atrial fibrillation     CAD (coronary artery disease) 2006    MI in 2006    CHF (congestive heart failure) 2017    CKD (chronic kidney disease) stage 3, GFR 30-59 ml/min     Dr. Latif.  in transplanted kidney    CKD (chronic kidney disease) stage 5, GFR less than 15 ml/min 02/02/2024    COVID-19 02/07/2023    Diverticulosis     Hyperlipidemia     Hypertension     Keloid cicatrix     Metabolic bone disease     Other  "emphysema 10/01/2019    Pericarditis     S/P kidney transplant 1992    x2 (1992 & 2005),    Thrombocytopenia     Thyroid disease     Tobacco use 09/05/2014       Past Surgical History:   Procedure Laterality Date    AV FISTULA PLACEMENT Left 12/18/2023    Procedure: CREATION, AV FISTULA;  Surgeon: JUANI Melendez III, MD;  Location: [facility] OR 2ND FLR;  Service: Vascular;  Laterality: Left;  LUE AVF creation vs AVG insertion    CARDIOVERSION  04/30/15    CHOLECYSTECTOMY      COLONOSCOPY  April 20, 2011    Diverticulosis, repeat recommended in 3 yrs., repeat colonoscopy 2014 revealed 2 polyps.  He should return in 5 years.    COLONOSCOPY N/A 3/13/2020    Procedure: COLONOSCOPY;  Surgeon: Chon Casper MD;  Location: [facility] (4TH FLR);  Service: Endoscopy;  Laterality: N/A;  ok to hold coumadin x5days-see telephone encounter 2/4/20-tb    COLONOSCOPY N/A 2/4/2021    Procedure: COLONOSCOPY;  Surgeon: Chon Casper MD;  Location: [facility] (4TH FLR);  Service: Endoscopy;  Laterality: N/A;  Eliquis - per Dr. GAVIN Blunt ok to hold x 2 days and "restarted asap"- ERW  last prep "poor", [id number] ordered/ Pt requests Dr. Casper  prep ins. mailed - COVID screening on 2/1/21 PCW- ERW    COLONOSCOPY N/A 3/3/2021    Procedure: COLONOSCOPY;  Surgeon: Chon Casper MD;  Location: [facility] (4TH FLR);  Service: Endoscopy;  Laterality: N/A;  Patient with his second poor bowel pre and has poor prep today.  If patient not intersted or can't get colonoscopy tomorrow will need constipation bowel prep and will need to restart his Eliquis today.Thanks,Chon Blunt-ok to hold Eliquis 2 days prior      COVID     COLONOSCOPY N/A 12/6/2024    Procedure: COLONOSCOPY;  Surgeon: Chon Casper MD;  Location: [facility] (2ND FLR);  Service: Endoscopy;  Laterality: N/A;  Plavix-Eliquis pending/ HD T-Th-Sat- lab ordered  2/25/24 ECHO PA 49  ref by / suprep inst portal-RN  11/29-message to clearance nurse for plavix and " eliquis hold-tb  ok to hold Eliquis 2 days per Dr Parmar  ok to hold Plavix 5 days per MEENA Jackson PA-GT  12/2- p    CORONARY ANGIOGRAPHY N/A 2/28/2024    Procedure: ANGIOGRAM, CORONARY ARTERY;  Surgeon: Tylor Lincoln MD;  Location: [facility] CATH LAB;  Service: Cardiology;  Laterality: N/A;    CORONARY ANGIOPLASTY WITH STENT PLACEMENT  9/13/2006    FISTULOGRAM N/A 2/19/2024    Procedure: Fistulogram;  Surgeon: JUANI Melendez III, MD;  Location: [facility] CATH LAB;  Service: Peripheral Vascular;  Laterality: N/A;    FISTULOGRAM, WITH PTA Left 6/3/2024    Procedure: FISTULOGRAM, WITH PTA;  Surgeon: JUANI Melendez III, MD;  Location: [facility] OR [facility] FLR;  Service: Vascular;  Laterality: Left;  mGy: 51.58  GyCm2: 6.8285  Fluoro time: 5.1 min  Contrast: 28 cc    IRRIGATION AND DEBRIDEMENT Right 8/30/2023    Procedure: IRRIGATION AND DEBRIDEMENT, RIGHT MIDDLE FINGER;  Surgeon: Martin Larsen MD;  [facility];  Service: Orthopedics;  Laterality: Right;    KIDNEY TRANSPLANT  2005    LEFT HEART CATHETERIZATION N/A 2/26/2024    Procedure: Left heart cath;  Surgeon: Tylor Lincoln MD;  Location: [facility] CATH LAB;  Service: Cardiology;  Laterality: N/A;    PARATHYROIDECTOMY      PERCUTANEOUS TRANSLUMINAL ANGIOPLASTY OF ARTERIOVENOUS FISTULA Left 2/19/2024    Procedure: PTA, AV FISTULA;  Surgeon: JUANI Melendez III, MD;  Location: [facility] CATH LAB;  Service: Peripheral Vascular;  Laterality: Left;    STENT, DRUG ELUTING, SINGLE VESSEL, CORONARY N/A 2/28/2024    Procedure: Stent, Drug Eluting, Single Vessel, Coronary;  Surgeon: Tylor Lincoln MD;  Location: [facility] CATH LAB;  Service: Cardiology;  Laterality: N/A;    TREATMENT OF CARDIAC ARRHYTHMIA N/A 3/28/2022    Procedure: CARDIOVERSION;  Surgeon: Migue Blunt MD;  Location: [facility] EP LAB;  Service: Cardiology;  Laterality: N/A;  AF, DCC, MAC, GP, 3 PREP*RODRIGO deferred pt 100% compliant*    VENOPLASTY  6/3/2024    Procedure: ANGIOPLASTY, VEIN;  Surgeon:  JUANI Melendez III, MD;  Location: [facility] OR [address] [place] [address];  Service: Vascular;;  Left subclavian       Review of patient's allergies indicates:   Allergen Reactions    Ace inhibitors Swelling    Verapamil Other (See Comments)     Other reaction(s): Unknown    Povidone-iodine Itching       No current facility-administered medications on file prior to encounter.     Current Outpatient Medications on File Prior to Encounter   Medication Sig    amiodarone (PACERONE) 200 MG Tab TAKE 1 TABLET BY MOUTH EVERY DAY    clopidogreL (PLAVIX) 75 mg tablet Take 1 tablet (75 mg total) by mouth once daily.    ELIQUIS 5 mg Tab TAKE 1 TABLET BY MOUTH TWICE A DAY    acetaminophen (TYLENOL) 500 MG tablet Take 1 tablet (500 mg total) by mouth every 6 (six) hours as needed for Pain.    albuterol (VENTOLIN HFA) 90 mcg/actuation inhaler Inhale 2 puffs into the lungs every 6 (six) hours as needed for Wheezing or Shortness of Breath. Rescue    albuterol-ipratropium (DUO-NEB) 2.5 mg-0.5 mg/3 mL nebulizer solution Take 3 mLs by nebulization every 4 (four) hours as needed for Wheezing. Rescue    atorvastatin (LIPITOR) 80 MG tablet Take 1 tablet (80 mg total) by mouth once daily.    b complex vitamins (B COMPLEX-VITAMIN B12) tablet Take 1 tablet by mouth once daily.    calcium carbonate (CALCIUM 600 ORAL) Take 1 tablet by mouth 2 (two) times a day.    CHOLECALCIFEROL, VITAMIN D3, ORAL Take 2 tablets by mouth 2 (two) times daily.    doxazosin (CARDURA) 4 MG tablet Take 1 tablet (4 mg total) by mouth every 12 (twelve) hours.    famotidine (PEPCID) 20 MG tablet TAKE 1 TABLET (20 MG TOTAL) BY MOUTH ONCE DAILY. ON DIALYSIS DAYS, PLEASE TAKE THIS MEDICINE AFTER DIALYSIS.    fexofenadine HCl (ALLEGRA ORAL) Take 1 tablet by mouth daily as needed (allergies).    fluticasone furoate-vilanteroL (BREO) 100-25 mcg/dose diskus inhaler Inhale 1 puff into the lungs once daily. Controller. Use this inhaler every day.    fluticasone propionate (FLONASE) 50  mcg/actuation nasal spray 1 spray (50 mcg total) by Each Nostril route daily as needed for Allergies.    gabapentin (NEURONTIN) 300 MG capsule Take 1 capsule (300 mg total) by mouth every evening.    ipratropium (ATROVENT) 21 mcg (0.03 %) nasal spray 2 sprays by Each Nostril route 3 (three) times daily.    metoprolol succinate (TOPROL-XL) 25 MG 24 hr tablet Take 0.5 tablets (12.5 mg total) by mouth once daily. (Patient not taking: Reported on 2/28/2025)    multivit-min-FA-lycopen-lutein (CENTRUM SILVER) 0.4 mg-300 mcg- 250 mcg Tab Take 1 tablet by mouth once daily.    multivitamin (THERAGRAN) per tablet Take 1 tablet by mouth Daily.    NIFEdipine (PROCARDIA-XL) 30 MG (OSM) 24 hr tablet Take 1 tablet (30 mg total) by mouth 2 (two) times a day. (Patient not taking: Reported on 2/28/2025)    nitroGLYCERIN (NITROSTAT) 0.4 MG SL tablet Place 1 tablet (0.4 mg total) under the tongue every 5 (five) minutes as needed for Chest pain.    omeprazole (PRILOSEC) 20 MG capsule Take 1 capsule (20 mg total) by mouth daily as needed (heartburn).    predniSONE (DELTASONE) 5 MG tablet Take 1 tablet (5 mg total) by mouth once daily. (Patient not taking: Reported on 2/28/2025)    pulse oximeter (PULSE OXIMETER) device by Apply Externally route 2 (two) times a day. Use twice daily at 8 AM and 3 PM and [facility] as directed.    tacrolimus (PROGRAF) 1 MG Cap Take 3 capsules (3 mg total) by mouth every 12 (twelve) hours for 60 days, THEN 2 capsules (2 mg total) every 12 (twelve) hours for 60 days, THEN 1 capsule (1 mg total) every 12 (twelve) hours for 60 days, THEN 1 capsule (1 mg total) once daily.    torsemide (DEMADEX) 20 MG Tab Take 2 tablets (40 mg total) by mouth 3 (three) times a week. (Patient taking differently: Take 40 mg by mouth 3 (three) times a week. Pt reports taking 40 mg BID)    traMADoL (ULTRAM) 50 mg tablet Take 1 tablet (50 mg total) by mouth every 6 (six) hours as needed for Pain.    [DISCONTINUED]  atorvastatin (LIPITOR) 80 MG tablet Take 1 tablet (80 mg total) by mouth once daily.     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     Patient is currently retired   Substance and Sexual Activity    Alcohol use: No    Drug use: No    Sexual activity: Yes     Partners: Female     Review of Systems   Constitutional:  Positive for chills. Negative for appetite change, diaphoresis and fever.   HENT:  Negative for congestion, rhinorrhea and sore throat.    Eyes:  Negative for photophobia and visual disturbance.   Respiratory:  Negative for cough, shortness of breath and wheezing.    Cardiovascular:  Negative for chest pain, palpitations and leg swelling.   Gastrointestinal:  Negative for abdominal distention, abdominal pain, diarrhea, nausea and vomiting.   Genitourinary:  Positive for decreased urine volume (ESRD on HD). Negative for dysuria and frequency.   Musculoskeletal:  Negative for back pain, myalgias and neck pain.   Skin:  Negative for color change, pallor, rash and wound.   Neurological:  Negative for syncope, weakness, light-headedness and headaches.   Psychiatric/Behavioral:  Negative for confusion and hallucinations. The patient is not nervous/anxious.      Objective:     Vital Signs (Most Recent):  Temp: 98 °F (36.7 °C) (02/28/25 2000)  Pulse: 72 (02/28/25 2000)  Resp: 17 (02/28/25 2000)  BP: (!) 155/70 (02/28/25 2000)  SpO2: 100 % (02/28/25  2000) Vital Signs (24h Range):  Temp:  [98 °F (36.7 °C)] 98 °F (36.7 °C)  Pulse:  [61-98] 72  Resp:  [17-20] 17  SpO2:  [95 %-100 %] 100 %  BP: ()/(47-95) 155/70     Weight: 84.8 kg (187 lb)  Body mass index is 24.67 kg/m².     Physical Exam  Vitals and nursing note reviewed.   Constitutional:       General: He is not in acute distress.     Appearance: He is not ill-appearing, toxic-appearing or diaphoretic.   HENT:      Head: Normocephalic and atraumatic.      Nose: Nose normal.      Mouth/Throat:      Mouth: Mucous membranes are moist.   Eyes:      Pupils: Pupils are equal, round, and reactive to light.   Cardiovascular:      Rate and Rhythm: Normal rate and regular rhythm.      Arteriovenous access: Left arteriovenous access is present.     Comments: +thrill  Pulmonary:      Effort: Pulmonary effort is normal. No respiratory distress.      Breath sounds: No wheezing, rhonchi or rales.   Abdominal:      General: Bowel sounds are normal. There is no distension.      Palpations: Abdomen is soft.      Tenderness: There is no abdominal tenderness. There is no guarding.   Musculoskeletal:         General: Normal range of motion.      Cervical back: Normal range of motion.      Right lower leg: No edema.      Left lower leg: No edema.   Skin:     General: Skin is warm and dry.      Capillary Refill: Capillary refill takes less than 2 seconds.   Neurological:      General: No focal deficit present.      Mental Status: He is alert and oriented to person, place, and time.      Motor: No weakness.   Psychiatric:         Mood and Affect: Mood normal.         Behavior: Behavior normal.              CRANIAL NERVES     CN III, IV, VI   Pupils are equal, round, and reactive to light.       Significant Labs: All pertinent labs within the past 24 hours have been reviewed.  CBC:   Recent Labs   Lab 02/28/25  0836 02/28/25  1646 02/28/25  1650 02/28/25 2005   WBC 9.12 9.60  --   --    HGB 16.0 15.1  --   --    HCT 54.6* 49.8  52 43    187  --   --      CMP:   Recent Labs   Lab 02/28/25  0836 02/28/25  1646   * 136   K 6.4* 6.2*   CL 96 99   CO2 23 21*   GLU 87 94   BUN 34* 39*   CREATININE 8.5* 9.1*   CALCIUM 9.9 9.3   PROT  --  8.5*   ALBUMIN  --  3.3*   BILITOT  --  0.4   ALKPHOS  --  88   AST  --  19   ALT  --  24   ANIONGAP 16 16       Significant Imaging: I have reviewed all pertinent imaging results/findings within the past 24 hours.  Imaging Results              X-Ray Chest AP Portable (Final result)  Result time 02/28/25 19:21:04      Final result by Dylan Garnett MD (02/28/25 19:21:04)                   Impression:      No acute intrathoracic process.      Electronically signed by: Dylan Garnett MD  Date:    02/28/2025  Time:    19:21               Narrative:    EXAMINATION:  XR CHEST AP PORTABLE    CLINICAL HISTORY:  ESRD;    TECHNIQUE:  Single frontal view of the chest was performed.    COMPARISON:  12/06/2024.    FINDINGS:  Monitoring EKG leads are present.  The trachea is unremarkable.  There are calcifications of the aortic knob.  The cardiomediastinal silhouette is within normal limits.  There is no evidence of free air beneath hemidiaphragms.  There are no pleural effusions.  There is no evidence of a pneumothorax.  No airspace opacity is present.    There are degenerative changes in the osseous structures.  There are calcifications in the neck vessels.                                      Assessment/Plan:     * Hyperkalemia  Hyperkalemia is likely due to ESRD.The patients most recent potassium results are listed below.  Recent Labs     02/28/25  0836 02/28/25  1646 02/28/25 2006   K 6.4* 6.2* 4.7     Plan  - Monitor for arrhythmias with EKG and/or continuous telemetry.   - Treat the hyperkalemia with Potassium Binders, Calcium gluconate, IV insulin and dextrose, Nebulized albuterol sulfate, and Furosemide.   - Monitor potassium: Every 12 hours  - The patient's hyperkalemia is resolved  - Will continue  raman queen, plan for HD tomorrow.    Stenosis of arteriovenous dialysis fistula  History noted. Seen in vascular surgery clinic 2/21 due to low flows.   -Per vascular note HD access US revealed new high-grade para anastomotic stenosis, moderate outflow stenosis. The most pressing of these of the para anastomotic stenosis.   -Scheduled for antegrade left fistulogram and intervention Wednesday March 5, 2025    Chronic obstructive pulmonary disease, unspecified COPD type  Patient's COPD is controlled currently.  Patient is currently off COPD Pathway. Continue scheduled inhalers Supplemental oxygen PRN and monitor respiratory status closely.     Heart failure with mildly reduced ejection fraction (HFmrEF)  Patient is identified as having Diastolic (HFpEF) heart failure that is Chronic. CHF is currently controlled. Latest ECHO performed and demonstrates- Results for orders placed during the hospital encounter of 12/06/24    Echo    Interpretation Summary    Left Ventricle: The left ventricle is normal in size. Ventricular mass is normal. Normal wall thickness. Normal wall motion. There is normal systolic function with a visually estimated ejection fraction of 60 - 65%. Diastolic function cannot be reliably determined in the presence of mitral annular calcification. Normal left ventricular filling pressure.    Right Ventricle: Normal right ventricular cavity size. Wall thickness is normal. Right ventricle wall motion  is normal. Systolic function is normal.    Left Atrium: Left atrium is severely dilated.    Right Atrium: Right atrium is moderately dilated.    Aortic Valve: There is moderate aortic valve sclerosis. There is moderate annular calcification present. Mildly restricted motion. There is mild stenosis. Aortic valve area by VTI is 1.6 cm2. Aortic valve peak velocity is 2.6 m/s. Mean gradient is 18.7 mmHg. The dimensionless index is 0.48. There is mild aortic regurgitation with a centrally directed jet. AV  "morphology not well seen. Non and left coronary leaflet is calcified and restricted.    Mitral Valve: There is severe posterior mitral annular calcification present.    Aorta: Aortic root is normal in size measuring 3.45 cm. Ascending aorta is normal measuring 2.78 cm.    Pulmonary Artery: There is mild pulmonary hypertension. The estimated pulmonary artery systolic pressure is 38 mmHg.    IVC/SVC: Normal venous pressure at 3 mmHg.    Pericardium: There is no pericardial effusion.  . Continue Beta Blocker , torsemide  and monitor clinical status closely. Monitor on telemetry. Patient is off CHF pathway.  Monitor strict Is&Os and daily weights.  Place on fluid restriction of 1.5 L. Continue to stress to patient importance of self efficacy and  on diet for CHF. Last BNP reviewed- and noted below No results for input(s): "BNP", "BNPTRIAGEBLO" in the last 168 hours..  -Volume management per HD.    Anemia of chronic kidney failure, stage 5  Anemia is likely due to chronic disease due to ESRD. Most recent hemoglobin and hematocrit are listed below.  Recent Labs     02/28/25  0836 02/28/25  1646 02/28/25  1650 02/28/25 2005   HGB 16.0 15.1  --   --    HCT 54.6* 49.8 52 43     Plan  - Monitor serial CBC: Daily  - Transfuse PRBC if patient becomes hemodynamically unstable, symptomatic or H/H drops below 7/21.  - Patient has not received any PRBC transfusions to date  - Patient's anemia is currently stable    Failed kidney transplant  Patient is failed kidney transplant 1992 and again in 2005, now ESRD on HD  -Reports he is no longer on prednisone and has been weaned off of prograf.    GERD (gastroesophageal reflux disease)  -Chronic, stable.  -Continue PPI.    ESRD (end stage renal disease) on dialysis  TThS schedule, last dialyzed Thursday  Residual renal function?- Yes    - Nephrology consulted  - Continue chronic hemodialysis  - Monitor daily electrolytes and defer dialysis orders to nephrology  - Renally dose " medications  - Renal diet  - Continue phosphorus binders  - Daily weights/strict I&Os  - 1.5L FR    Benign essential tremor  -History noted    Pulmonary hypertension  -History noted. Last ECHO with pulmonary artery systolic pressure of 38 mmHg    Carotid artery disease  -History noted.  -Continue plavix, eliquis, and lipitor.    Paroxysmal atrial fibrillation  Long term (current) use of anticoagulants [V58.61]   Patient has paroxysmal (<7 days) atrial fibrillation. Patient is currently in sinus rhythm. [id number] Score: 2. The patients heart rate in the last 24 hours is as follows:  Pulse  Min: 61  Max: 98     Antiarrhythmics  amiodarone tablet 200 mg, Daily, Oral    Anticoagulants  apixaban tablet 5 mg, 2 times daily, Oral    Plan  - Replete lytes with a goal of K>4, Mg >2  - Patient is anticoagulated, see medications listed above.  - Patient's afib is currently controlled    Mixed hyperlipidemia   Patient is chronically on statin.will continue for now. Monitor clinically. Last LDL was   Lab Results   Component Value Date    LDLCALC 44.8 (L) 06/11/2024    LDLCALC 44.8 (L) 06/11/2024       Primary hypertension  Patient's blood pressure range in the last 24 hours was: BP  Min: 96/47  Max: 185/95.The patient's inpatient anti-hypertensive regimen is listed below:  Current Antihypertensives  doxazosin tablet 4 mg, Every 12 hours, Oral  torsemide tablet 40 mg, Three times weekly, Oral    Plan  - BP is controlled, no changes needed to their regimen  - Recently holding metoprolol and nifedipine 2/2 hypotension.       VTE Risk Mitigation (From admission, onward)           Ordered     apixaban tablet 5 mg  2 times daily         02/28/25 2120     Reason for No Pharmacological VTE Prophylaxis  Once        Question:  Reasons:  Answer:  Already adequately anticoagulated on oral Anticoagulants    02/28/25 2120     IP VTE HIGH RISK PATIENT  Once         02/28/25 2120     Place sequential compression device  Until discontinued          02/28/25 2120                         On 02/28/2025, patient should be placed in hospital observation services under my care in collaboration with Jamil Phelps MD.           Jacqui Umaña NP  Department of Hospital Medicine  [facility] - Emergency Dept

## 2025-03-01 NOTE — ASSESSMENT & PLAN NOTE
History noted. Seen in vascular surgery clinic 2/21 due to low flows.   -Per vascular note HD access US revealed new high-grade para anastomotic stenosis, moderate outflow stenosis. The most pressing of these of the para anastomotic stenosis.   -Scheduled for antegrade left fistulogram and intervention Wednesday March 5, 2025

## 2025-03-01 NOTE — ASSESSMENT & PLAN NOTE
Patient's blood pressure range in the last 24 hours was: BP  Min: 96/47  Max: 185/95.The patient's inpatient anti-hypertensive regimen is listed below:  Current Antihypertensives  doxazosin tablet 4 mg, Every 12 hours, Oral  torsemide tablet 40 mg, Three times weekly, Oral    Plan  - BP is controlled, no changes needed to their regimen  - Recently holding metoprolol and nifedipine 2/2 hypotension.

## 2025-03-01 NOTE — HPI
Ibrahima Phelps is a 69 y.o. male with a PMHx of atrial fibrillation on Eliquis, CAD, ESRD on HD (T/Th/Sat), failed DDKT x2, HTN, HLD, COPD, and CHF (EF 60-65%) who presents at the direction of vascular surgeon secondary to a potassium of 6.4. Patient reports full dialysis session yesterday. He states he voids every other day. The patient denies CP, SOB, BLE edema, N/V/D, abdominal pain, fever, or dysuria. He notes chills x1 week.    In the ED, pt initially hypertensive which improved without treatment otherwise vitals stable, afebrile. CBC unremarkable. K+6.2>>4.7. Bicarb 21. Cr 9.1 (consistent with ESRD). Total protein 8.5. Albumin 3.3. Phos 5.9. EKG shows SR w/ PACs, 68 bpm, non specific ST and T wave abnormality. CXR with no acute intrathoracic process. The patient received albuterol neb, 1g calcium gluconate, D50, 5u regular insulin IV, and 120mg IV lasix.

## 2025-03-01 NOTE — DISCHARGE SUMMARY
[facility] - Emergency Dept  [facility] Medicine  Discharge Summary      Patient Name: Ibrahima Phelps  MRN: [id number]  SHAVONNE: [id number]  Patient Class: OP- Observation  Admission Date: 2/28/2025  Hospital Length of Stay: 0 days  Discharge Date and Time: 3/1/2025  9:50 AM  Attending Physician: Sena Bergeron MD   Discharging Provider: Sena Bergeron MD  Primary Care Provider: Anatoliy Colindres MD  Hospital Medicine Team: [facility] Sena Bergeron MD  Primary Care Team: [facility]    HPI:   Ibrahima Phelps is a 69 y.o. male with a PMHx of atrial fibrillation on Eliquis, CAD, ESRD on HD (T/Th/Sat), failed DDKT x2, HTN, HLD, COPD, and CHF (EF 60-65%) who presents at the direction of vascular surgeon secondary to a potassium of 6.4. Patient reports full dialysis session yesterday. He states he voids every other day. The patient denies CP, SOB, BLE edema, N/V/D, abdominal pain, fever, or dysuria. He notes chills x1 week.    In the ED, pt initially hypertensive which improved without treatment otherwise vitals stable, afebrile. CBC unremarkable. K+6.2>>4.7. Bicarb 21. Cr 9.1 (consistent with ESRD). Total protein 8.5. Albumin 3.3. Phos 5.9. EKG shows SR w/ PACs, 68 bpm, non specific ST and T wave abnormality. CXR with no acute intrathoracic process. The patient received albuterol neb, 1g calcium gluconate, D50, 5u regular insulin IV, and 120mg IV lasix.    * No surgery found *      Hospital Course:   Patient admitted for hyperkalemia. Potassium improved to moderately elevated range AM 3/1. Discussed with nephrology and patient - okay to dc from hospital and go to normal HD center today for regularly scheduled session.     Patient seen and examined on day of discharge and deemed appropriate for discharge. Plan discussed with patient, who was agreeable and amenable. Medications were discussed and reviewed, outpatient follow-up scheduled, ER precautions were given, all questions were answered to the patient's  satisfaction, and patient was subsequently discharged.      Goals of Care Treatment Preferences:  Code Status: Full Code    Health care agent: Pt's wife is FREDAK  Health care agent number: No value filed.                      Consults:   Consults (From admission, onward)          Status Ordering Provider     Inpatient consult to Nephrology  Once        Provider:  (Not yet assigned)    Acknowledged MARLEE ARENAS            * Hyperkalemia  Hyperkalemia is likely due to ESRD.The patients most recent potassium results are listed below.  Recent Labs     02/28/25 2006 02/28/25  2159 03/01/25  0452   K 4.7 5.4* 5.5*       Plan  - Monitor for arrhythmias with EKG and/or continuous telemetry.   - Treat the hyperkalemia with Potassium Binders, Calcium gluconate, IV insulin and dextrose, Nebulized albuterol sulfate, and Furosemide.   - Monitor potassium: Every 12 hours  - The patient's hyperkalemia is resolved  - Will continue lokelma tid, plan for HD tomorrow.    Chronic obstructive pulmonary disease, unspecified COPD type  Patient's COPD is controlled currently.  Patient is currently off COPD Pathway. Continue scheduled inhalers Supplemental oxygen PRN and monitor respiratory status closely.     Heart failure with mildly reduced ejection fraction (HFmrEF)  Patient is identified as having Diastolic (HFpEF) heart failure that is Chronic. CHF is currently controlled. Latest ECHO performed and demonstrates- Results for orders placed during the hospital encounter of 12/06/24    Echo    Interpretation Summary    Left Ventricle: The left ventricle is normal in size. Ventricular mass is normal. Normal wall thickness. Normal wall motion. There is normal systolic function with a visually estimated ejection fraction of 60 - 65%. Diastolic function cannot be reliably determined in the presence of mitral annular calcification. Normal left ventricular filling pressure.    Right Ventricle: Normal right ventricular cavity size.  "Wall thickness is normal. Right ventricle wall motion  is normal. Systolic function is normal.    Left Atrium: Left atrium is severely dilated.    Right Atrium: Right atrium is moderately dilated.    Aortic Valve: There is moderate aortic valve sclerosis. There is moderate annular calcification present. Mildly restricted motion. There is mild stenosis. Aortic valve area by VTI is 1.6 cm2. Aortic valve peak velocity is 2.6 m/s. Mean gradient is 18.7 mmHg. The dimensionless index is 0.48. There is mild aortic regurgitation with a centrally directed jet. AV morphology not well seen. Non and left coronary leaflet is calcified and restricted.    Mitral Valve: There is severe posterior mitral annular calcification present.    Aorta: Aortic root is normal in size measuring 3.45 cm. Ascending aorta is normal measuring 2.78 cm.    Pulmonary Artery: There is mild pulmonary hypertension. The estimated pulmonary artery systolic pressure is 38 mmHg.    IVC/SVC: Normal venous pressure at 3 mmHg.    Pericardium: There is no pericardial effusion.  . Continue Beta Blocker , torsemide  and monitor clinical status closely. Monitor on telemetry. Patient is off CHF pathway.  Monitor strict Is&Os and daily weights.  Place on fluid restriction of 1.5 L. Continue to stress to patient importance of self efficacy and  on diet for CHF. Last BNP reviewed- and noted below No results for input(s): "BNP", "BNPTRIAGEBLO" in the last 168 hours..  -Volume management per HD.    Stenosis of arteriovenous dialysis fistula  History noted. Seen in vascular surgery clinic 2/21 due to low flows.   -Per vascular note HD access US revealed new high-grade para anastomotic stenosis, moderate outflow stenosis. The most pressing of these of the para anastomotic stenosis.   -Scheduled for antegrade left fistulogram and intervention Wednesday March 5, 2025    Anemia of chronic kidney failure, stage 5  Anemia is likely due to chronic disease due to ESRD. " Most recent hemoglobin and hematocrit are listed below.  Recent Labs     02/28/25  0836 02/28/25  1646 02/28/25  1650 02/28/25 2005 03/01/25  0452   HGB 16.0 15.1  --   --  13.2*   HCT 54.6* 49.8 52 43 41.8       Plan  - Monitor serial CBC: Daily  - Transfuse PRBC if patient becomes hemodynamically unstable, symptomatic or H/H drops below 7/21.  - Patient has not received any PRBC transfusions to date  - Patient's anemia is currently stable    Failed kidney transplant  Patient is failed kidney transplant 1992 and again in 2005, now ESRD on HD  -Reports he is no longer on prednisone and has been weaned off of prograf.    GERD (gastroesophageal reflux disease)  -Chronic, stable.  -Continue PPI.    ESRD (end stage renal disease) on dialysis  TThS schedule, last dialyzed Thursday  Residual renal function?- Yes    - Nephrology consulted  - Continue chronic hemodialysis  - Monitor daily electrolytes and defer dialysis orders to nephrology  - Renally dose medications  - Renal diet  - Continue phosphorus binders  - Daily weights/strict I&Os  - 1.5L FR    Benign essential tremor  -History noted    Pulmonary hypertension  -History noted. Last ECHO with pulmonary artery systolic pressure of 38 mmHg    Carotid artery disease  -History noted.  -Continue plavix, eliquis, and lipitor.    Paroxysmal atrial fibrillation  Long term (current) use of anticoagulants [V58.61]   Patient has paroxysmal (<7 days) atrial fibrillation. Patient is currently in sinus rhythm. [id number] Score: 2. The patients heart rate in the last 24 hours is as follows:  Pulse  Min: 61  Max: 98     Antiarrhythmics  amiodarone tablet 200 mg, Daily, Oral    Anticoagulants  apixaban tablet 5 mg, 2 times daily, Oral    Plan  - Replete lytes with a goal of K>4, Mg >2  - Patient is anticoagulated, see medications listed above.  - Patient's afib is currently controlled    Mixed hyperlipidemia   Patient is chronically on statin.will continue for now. Monitor  clinically. Last LDL was   Lab Results   Component Value Date    LDLCALC 44.8 (L) 06/11/2024    LDLCALC 44.8 (L) 06/11/2024       Primary hypertension  Patient's blood pressure range in the last 24 hours was: BP  Min: 96/47  Max: 185/95.The patient's inpatient anti-hypertensive regimen is listed below:  Current Antihypertensives  doxazosin tablet 4 mg, Every 12 hours, Oral  torsemide tablet 40 mg, Three times weekly, Oral    Plan  - BP is controlled, no changes needed to their regimen  - Recently holding metoprolol and nifedipine 2/2 hypotension.       Final Active Diagnoses:    Diagnosis Date Noted POA    PRINCIPAL PROBLEM:  Hyperkalemia [E87.5] 02/28/2025 Yes    Chronic obstructive pulmonary disease, unspecified COPD type [J44.9] 02/28/2025 Yes    Heart failure with mildly reduced ejection fraction (HFmrEF) [I50.22] 09/12/2024 Yes    Stenosis of arteriovenous dialysis fistula [T82.858A] 03/22/2024 Yes    Anemia of chronic kidney failure, stage 5 [N18.5, D63.1] 02/26/2024 Yes    Failed kidney transplant [T86.12] 02/24/2024 Yes    GERD (gastroesophageal reflux disease) [K21.9] 02/19/2024 Yes    ESRD (end stage renal disease) on dialysis [N18.6, Z99.2] 02/02/2024 Not Applicable    Benign essential tremor [G25.0] 10/06/2023 Yes     Chronic    Pulmonary hypertension [I27.20] 02/10/2023 Yes    Carotid artery disease [I77.9] 03/23/2021 Yes     Chronic    Paroxysmal atrial fibrillation [I48.0] 09/25/2017 Yes     Chronic    Long term (current) use of anticoagulants [V58.61] [Z79.01] 05/04/2015 Not Applicable     Chronic    Primary hypertension [I10]  Yes    Mixed hyperlipidemia [E78.2]  Yes     Chronic      Problems Resolved During this Admission:       Discharged Condition: stable    Disposition:     Follow Up:    Patient Instructions:   No discharge procedures on file.    Significant Diagnostic Studies: Labs: BMP:   Recent Labs   Lab 02/28/25  1646 02/28/25  2006 02/28/25  2159 03/01/25  0452   GLU 94 124* 87 99     135* 137 136   K 6.2* 4.7 5.4* 5.5*   CL 99 106 105 99   CO2 21* 16* 19* 21*   BUN 39* 38* 42* 47*   CREATININE 9.1* 8.1* 8.6* 10.1*   CALCIUM 9.3 15.4* 8.5* 8.5*   MG 2.4  --   --  2.3       Pending Diagnostic Studies:       None           Medications:  Reconciled Home Medications:      Medication List        CONTINUE taking these medications      acetaminophen 500 MG tablet  Commonly known as: TYLENOL  Take 1 tablet (500 mg total) by mouth every 6 (six) hours as needed for Pain.     albuterol 90 mcg/actuation inhaler  Commonly known as: VENTOLIN HFA  Inhale 2 puffs into the lungs every 6 (six) hours as needed for Wheezing or Shortness of Breath. Rescue     albuterol-ipratropium 2.5 mg-0.5 mg/3 mL nebulizer solution  Commonly known as: DUO-NEB  Take 3 mLs by nebulization every 4 (four) hours as needed for Wheezing. Rescue     ALLEGRA ORAL  Take 1 tablet by mouth daily as needed (allergies).     amiodarone 200 MG Tab  Commonly known as: PACERONE  TAKE 1 TABLET BY MOUTH EVERY DAY     atorvastatin 80 MG tablet  Commonly known as: LIPITOR  Take 1 tablet (80 mg total) by mouth once daily.     b complex vitamins tablet  Commonly known as: B COMPLEX-VITAMIN B12  Take 1 tablet by mouth once daily.     CALCIUM 600 ORAL  Take 1 tablet by mouth 2 (two) times a day.     CENTRUM SILVER 0.4 mg-300 mcg- 250 mcg Tab  Generic drug: multivit-min-FA-lycopen-lutein  Take 1 tablet by mouth once daily.     clopidogreL 75 mg tablet  Commonly known as: PLAVIX  Take 1 tablet (75 mg total) by mouth once daily.     doxazosin 4 MG tablet  Commonly known as: CARDURA  Take 1 tablet (4 mg total) by mouth every 12 (twelve) hours.     ELIQUIS 5 mg Tab  Generic drug: apixaban  TAKE 1 TABLET BY MOUTH TWICE A DAY     famotidine 20 MG tablet  Commonly known as: PEPCID  TAKE 1 TABLET (20 MG TOTAL) BY MOUTH ONCE DAILY. ON DIALYSIS DAYS, PLEASE TAKE THIS MEDICINE AFTER DIALYSIS.     fluticasone furoate-vilanteroL 100-25 mcg/dose diskus inhaler  Commonly  known as: BREO  Inhale 1 puff into the lungs once daily. Controller. Use this inhaler every day.     fluticasone propionate 50 mcg/actuation nasal spray  Commonly known as: FLONASE  1 spray (50 mcg total) by Each Nostril route daily as needed for Allergies.     gabapentin 300 MG capsule  Commonly known as: NEURONTIN  Take 1 capsule (300 mg total) by mouth every evening.     ipratropium 21 mcg (0.03 %) nasal spray  Commonly known as: ATROVENT  2 sprays by Each Nostril route 3 (three) times daily.     multivitamin per tablet  Commonly known as: THERAGRAN  Take 1 tablet by mouth Daily.     nitroGLYCERIN 0.4 MG SL tablet  Commonly known as: NITROSTAT  Place 1 tablet (0.4 mg total) under the tongue every 5 (five) minutes as needed for Chest pain.     omeprazole 20 MG capsule  Commonly known as: PRILOSEC  Take 1 capsule (20 mg total) by mouth daily as needed (heartburn).     pulse oximeter device  Commonly known as: pulse oximeter  by Apply Externally route 2 (two) times a day. Use twice daily at 8 AM and 3 PM and [facility] as [facility].     tacrolimus 1 MG Cap  Commonly known as: PROGRAF  Take 3 capsules (3 mg total) by mouth every 12 (twelve) hours for 60 days, THEN 2 capsules (2 mg total) every 12 (twelve) hours for 60 days, THEN 1 capsule (1 mg total) every 12 (twelve) hours for 60 days, THEN 1 capsule (1 mg total) once daily.  Start taking on: March 2, 2024     torsemide 20 MG Tab  Commonly known as: DEMADEX  Take 2 tablets (40 mg total) by mouth 3 (three) times a week.            STOP taking these medications      CHOLECALCIFEROL (VITAMIN D3) ORAL     metoprolol succinate 25 MG 24 hr tablet  Commonly known as: TOPROL-XL     NIFEdipine 30 MG (OSM) 24 hr tablet  Commonly known as: PROCARDIA-XL     predniSONE 5 MG tablet  Commonly known as: DELTASONE     traMADoL 50 mg tablet  Commonly known as: ULTRAM              Indwelling Lines/Drains at time of discharge:   Lines/Drains/Airways       Drain   Duration                  Hemodialysis AV Fistula Left upper arm -- days         Hemodialysis AV Fistula 02/19/24 0924 Left upper arm 375 days                    Time spent on the discharge of patient: 15 minutes         Sena Bergeron MD  Department of Hospital Medicine  [facility] - Emergency Dept

## 2025-03-01 NOTE — ASSESSMENT & PLAN NOTE
Patient is identified as having Diastolic (HFpEF) heart failure that is Chronic. CHF is currently controlled. Latest ECHO performed and demonstrates- Results for orders placed during the hospital encounter of 12/06/24    Echo    Interpretation Summary    Left Ventricle: The left ventricle is normal in size. Ventricular mass is normal. Normal wall thickness. Normal wall motion. There is normal systolic function with a visually estimated ejection fraction of 60 - 65%. Diastolic function cannot be reliably determined in the presence of mitral annular calcification. Normal left ventricular filling pressure.    Right Ventricle: Normal right ventricular cavity size. Wall thickness is normal. Right ventricle wall motion  is normal. Systolic function is normal.    Left Atrium: Left atrium is severely dilated.    Right Atrium: Right atrium is moderately dilated.    Aortic Valve: There is moderate aortic valve sclerosis. There is moderate annular calcification present. Mildly restricted motion. There is mild stenosis. Aortic valve area by VTI is 1.6 cm2. Aortic valve peak velocity is 2.6 m/s. Mean gradient is 18.7 mmHg. The dimensionless index is 0.48. There is mild aortic regurgitation with a centrally directed jet. AV morphology not well seen. Non and left coronary leaflet is calcified and restricted.    Mitral Valve: There is severe posterior mitral annular calcification present.    Aorta: Aortic root is normal in size measuring 3.45 cm. Ascending aorta is normal measuring 2.78 cm.    Pulmonary Artery: There is mild pulmonary hypertension. The estimated pulmonary artery systolic pressure is 38 mmHg.    IVC/SVC: Normal venous pressure at 3 mmHg.    Pericardium: There is no pericardial effusion.  . Continue Beta Blocker , torsemide  and monitor clinical status closely. Monitor on telemetry. Patient is off CHF pathway.  Monitor strict Is&Os and daily weights.  Place on fluid restriction of 1.5 L. Continue to stress to patient  "importance of self efficacy and  on diet for CHF. Last BNP reviewed- and noted below No results for input(s): "BNP", "BNPTRIAGEBLO" in the last 168 hours..  -Volume management per HD.  "

## 2025-03-03 ENCOUNTER — TELEPHONE (OUTPATIENT)
Dept: VASCULAR SURGERY | Facility: CLINIC | Age: 70
End: 2025-03-03
Payer: MEDICARE

## 2025-03-03 ENCOUNTER — ANESTHESIA EVENT (OUTPATIENT)
Dept: SURGERY | Facility: HOSPITAL | Age: 70
End: 2025-03-03
Payer: MEDICARE

## 2025-03-04 NOTE — ANESTHESIA PREPROCEDURE EVALUATION
03/04/2025  Ibrahima Phelps is a 69 y.o., male.    Procedure: PTA, AV FISTULA (Left: Chest)   Anesthesia type: Local MAC   Diagnosis: AV fistula stenosis, subsequent encounter [T82.629D]       Pre-operative evaluation for Procedure(s) (LRB):  PTA, AV FISTULA (Left)      Encounter Diagnosis   Name Primary?    AV fistula stenosis        Review of patient's allergies indicates:   Allergen Reactions    Ace inhibitors Swelling    Verapamil Other (See Comments)     Other reaction(s): Unknown    Povidone-iodine Itching       No medications prior to admission.            No current facility-administered medications on file prior to encounter.     Current Outpatient Medications on File Prior to Encounter   Medication Sig Dispense Refill    acetaminophen (TYLENOL) 500 MG tablet Take 1 tablet (500 mg total) by mouth every 6 (six) hours as needed for Pain. 20 tablet 0    albuterol (VENTOLIN HFA) 90 mcg/actuation inhaler Inhale 2 puffs into the lungs every 6 (six) hours as needed for Wheezing or Shortness of Breath. Rescue 18 g 3    albuterol-ipratropium (DUO-NEB) 2.5 mg-0.5 mg/3 mL nebulizer solution Take 3 mLs by nebulization every 4 (four) hours as needed for Wheezing. Rescue 75 mL 0    amiodarone (PACERONE) 200 MG Tab TAKE 1 TABLET BY MOUTH EVERY DAY 90 tablet 3    b complex vitamins (B COMPLEX-VITAMIN B12) tablet Take 1 tablet by mouth once daily. 90 tablet 3    calcium carbonate (CALCIUM 600 ORAL) Take 1 tablet by mouth 2 (two) times a day.      clopidogreL (PLAVIX) 75 mg tablet Take 1 tablet (75 mg total) by mouth once daily. 90 tablet 3    doxazosin (CARDURA) 4 MG tablet Take 1 tablet (4 mg total) by mouth every 12 (twelve) hours. 180 tablet 3    ELIQUIS 5 mg Tab TAKE 1 TABLET BY MOUTH TWICE A DAY 60 tablet 32    famotidine (PEPCID) 20 MG tablet TAKE 1 TABLET (20 MG TOTAL) BY MOUTH ONCE DAILY. ON DIALYSIS DAYS,  PLEASE TAKE THIS MEDICINE AFTER DIALYSIS. 90 tablet 1    fexofenadine HCl (ALLEGRA ORAL) Take 1 tablet by mouth daily as needed (allergies).      fluticasone furoate-vilanteroL (BREO) 100-25 mcg/dose diskus inhaler Inhale 1 puff into the lungs once daily. Controller. Use this inhaler every day.      fluticasone propionate (FLONASE) 50 mcg/actuation nasal spray 1 spray (50 mcg total) by Each Nostril route daily as needed for Allergies.      gabapentin (NEURONTIN) 300 MG capsule Take 1 capsule (300 mg total) by mouth every evening. 90 capsule 3    multivit-min-FA-lycopen-lutein (CENTRUM SILVER) 0.4 mg-300 mcg- 250 mcg Tab Take 1 tablet by mouth once daily.      multivitamin (THERAGRAN) per tablet Take 1 tablet by mouth Daily.      nitroGLYCERIN (NITROSTAT) 0.4 MG SL tablet Place 1 tablet (0.4 mg total) under the tongue every 5 (five) minutes as needed for Chest pain. 25 tablet 0    omeprazole (PRILOSEC) 20 MG capsule Take 1 capsule (20 mg total) by mouth daily as needed (heartburn).      pulse oximeter (PULSE OXIMETER) device by Apply Externally route 2 (two) times a day. Use twice daily at 8 AM and 3 PM and [facility] the [facility] as directed. 1 each 0    tacrolimus (PROGRAF) 1 MG Cap Take 3 capsules (3 mg total) by mouth every 12 (twelve) hours for 60 days, THEN 2 capsules (2 mg total) every 12 (twelve) hours for 60 days, THEN 1 capsule (1 mg total) every 12 (twelve) hours for 60 days, THEN 1 capsule (1 mg total) once daily. 540 capsule 4    torsemide (DEMADEX) 20 MG Tab Take 2 tablets (40 mg total) by mouth 3 (three) times a week. (Patient taking differently: Take 40 mg by mouth 3 (three) times a week. Pt reports taking 40 mg BID) 90 tablet 3       Past Medical History:  No date: Atrial fibrillation  2006: CAD (coronary artery disease)      Comment:  MI in 2006  2017: CHF (congestive heart failure)  No date: CKD (chronic kidney disease) stage 3, GFR 30-59 ml/min      Comment:  Dr. Latif.  in transplanted  "kidney  02/02/2024: CKD (chronic kidney disease) stage 5, GFR less than 15 ml/  min  02/07/2023: COVID-19  No date: Diverticulosis  No date: Hyperlipidemia  No date: Hypertension  No date: Keloid cicatrix  No date: Metabolic bone disease  10/01/2019: Other emphysema  No date: Pericarditis  1992: S/P kidney transplant      Comment:  x2 (1992 & 2005),  No date: Thrombocytopenia  No date: Thyroid disease  09/05/2014: Tobacco use    Past Surgical History:   Procedure Laterality Date    AV FISTULA PLACEMENT Left 12/18/2023    Procedure: CREATION, AV FISTULA;  Surgeon: JUANI Melendez III, MD;  Location: [facility] OR 2ND FLR;  Service: Vascular;  Laterality: Left;  LUE AVF creation vs AVG insertion    CARDIOVERSION  04/30/15    CHOLECYSTECTOMY      COLONOSCOPY  April 20, 2011    Diverticulosis, repeat recommended in 3 yrs., repeat colonoscopy 2014 revealed 2 polyps.  He should return in 5 years.    COLONOSCOPY N/A 3/13/2020    Procedure: COLONOSCOPY;  Surgeon: Chon Casper MD;  Location: [facility] (4TH FLR);  Service: Endoscopy;  Laterality: N/A;  ok to hold coumadin x5days-see telephone encounter 2/4/20-tb    COLONOSCOPY N/A 2/4/2021    Procedure: COLONOSCOPY;  Surgeon: Chon Casper MD;  Location: [facility] (4TH FLR);  Service: Endoscopy;  Laterality: N/A;  Eliquis - per Dr. GAVIN chua to hold x 2 days and "restarted asap"- ERW  last prep "poor", [id number] ordered/ Pt requests Dr. Casper  prep ins. mailed - COVID screening on 2/1/21 PCW- ERW    COLONOSCOPY N/A 3/3/2021    Procedure: COLONOSCOPY;  Surgeon: Chon Casper MD;  Location: [facility] (4TH FLR);  Service: Endoscopy;  Laterality: N/A;  Patient with his second poor bowel pre and has poor prep today.  If patient not intersted or can't get colonoscopy tomorrow will need constipation bowel prep and will need to restart his Eliquis today.Thanks,Chon     per Dr Blunt-ok to hold Eliquis 2 days prior      COVID     COLONOSCOPY N/A 12/6/2024    Procedure: " COLONOSCOPY;  Surgeon: Chon Casper MD;  Location: [facility] ENDO (2ND FLR);  Service: Endoscopy;  Laterality: N/A;  Plavix-Eliquis pending/ HD T-Th-Sat- lab ordered  2/25/24 ECHO PA 49  ref by / suprep inst portal-RN  11/29-message to clearance nurse for plavix and eliquis hold-tb  ok to hold Eliquis 2 days per Dr Parmar  ok to hold Plavix 5 days per MEENA Jackson PA-GT  12/2- p    CORONARY ANGIOGRAPHY N/A 2/28/2024    Procedure: ANGIOGRAM, CORONARY ARTERY;  Surgeon: Tylor Lincoln MD;  Location: [facility] CATH LAB;  Service: Cardiology;  Laterality: N/A;    CORONARY ANGIOPLASTY WITH STENT PLACEMENT  9/13/2006    FISTULOGRAM N/A 2/19/2024    Procedure: Fistulogram;  Surgeon: JUANI Melendez III, MD;  Location: [facility] CATH LAB;  Service: Peripheral Vascular;  Laterality: N/A;    FISTULOGRAM, WITH PTA Left 6/3/2024    Procedure: FISTULOGRAM, WITH PTA;  Surgeon: JUANI Melendez III, MD;  Location: [facility] OR 2ND FLR;  Service: Vascular;  Laterality: Left;  mGy: 51.58  GyCm2: 6.8285  Fluoro time: 5.1 min  Contrast: 28 cc    IRRIGATION AND DEBRIDEMENT Right 8/30/2023    Procedure: IRRIGATION AND DEBRIDEMENT, RIGHT MIDDLE FINGER;  Surgeon: Martin Larsen MD;  [facility];  Service: Orthopedics;  Laterality: Right;    KIDNEY TRANSPLANT  2005    LEFT HEART CATHETERIZATION N/A 2/26/2024    Procedure: Left heart cath;  Surgeon: Tylor Lincoln MD;  Location: [facility] CATH LAB;  Service: Cardiology;  Laterality: N/A;    PARATHYROIDECTOMY      PERCUTANEOUS TRANSLUMINAL ANGIOPLASTY OF ARTERIOVENOUS FISTULA Left 2/19/2024    Procedure: PTA, AV FISTULA;  Surgeon: JUANI Melendez III, MD;  Location: [facility] CATH LAB;  Service: Peripheral Vascular;  Laterality: Left;    STENT, DRUG ELUTING, SINGLE VESSEL, CORONARY N/A 2/28/2024    Procedure: Stent, Drug Eluting, Single Vessel, Coronary;  Surgeon: Tylor Lincoln MD;  Location: [facility] CATH LAB;  Service: Cardiology;  Laterality: N/A;    TREATMENT OF  "CARDIAC ARRHYTHMIA N/A 3/28/2022    Procedure: CARDIOVERSION;  Surgeon: Migue Blunt MD;  Location: [facility] EP LAB;  Service: Cardiology;  Laterality: N/A;  AF, DCC, MAC, GP, 3 PREP*RODRIGO deferred pt 100% compliant*    VENOPLASTY  6/3/2024    Procedure: ANGIOPLASTY, VEIN;  Surgeon: JUANI Melendez III, MD;  [facility];  Service: Vascular;;  Left subclavian       Social History     Tobacco Use   Smoking Status Every Day    Current packs/day: 0.00    Average packs/day: 0.5 packs/day for 40.0 years (20.0 ttl pk-yrs)    Types: Cigarettes    Start date: 2/28/1982    Last attempt to quit: 2/28/2022    Years since quitting: 3.0   Smokeless Tobacco Never   Tobacco Comments    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    Patient is currently retired       Social History     Substance and Sexual Activity   Alcohol Use No       Physical Activity: Inactive (12/13/2024)    Exercise Vital Sign     Days of Exercise per Week: 0 days     Minutes of Exercise per Session: 0 min       No birth history on file.  No results for input(s): "HCT" in the last 72 hours.  No results for input(s): "PLT" in the last 72 hours.  No results for input(s): "K" in the last 72 hours.  No results for input(s): "CREATININE" in the last 72 hours.  No results for input(s): "GLU" in the last 72 hours.  No results for input(s): "PT" in the last 72 hours.  There were no vitals filed for this visit.                    Pre-op Assessment          Review of Systems  Anesthesia Hx:  No problems with previous Anesthesia                Hematology/Oncology:  Hematology Normal   Oncology Normal                                   Cardiovascular:     Hypertension, well controlled Valvular problems/Murmurs (mild as and ai), AS, AI  Denies MI.        Denies Angina.         Mild pulmo htn                           Pulmonary:  Pulmonary Normal   Denies COPD.  Denies Asthma.   Denies Shortness of breath.                "   Renal/:   Denies Chronic Renal Disease.                Hepatic/GI:      Denies Liver Disease.               Neurological:  Denies TIA.  Denies CVA.    Denies Seizures.                                Endocrine:  Denies Diabetes.               Physical Exam  General: Well nourished    Airway:  Mallampati: III / II  Mouth Opening: Normal  TM Distance: Normal  Tongue: Normal  Neck ROM: Normal ROM    Chest/Lungs:  Normal Respiratory Rate    Heart:  Rate: Normal        Anesthesia Plan  Type of Anesthesia, risks & benefits discussed:    Anesthesia Type: MAC  Intra-op Monitoring Plan: Standard ASA Monitors  Post Op Pain Control Plan: multimodal analgesia  Induction:  IV  Informed Consent: Informed consent signed with the Patient and all parties understand the risks and agree with anesthesia plan.  All questions answered.   ASA Score: 3  Day of Surgery Review of History & Physical: H&P Update referred to the surgeon/provider.  Anesthesia Plan Notes: NPO confirmed.   Fistula stenosis case with light sedation only (no block)    Ready For Surgery From Anesthesia Perspective.   Vent. Rate :  68 BPM     Atrial Rate :  68 BPM      P-R Int : 188 ms          QRS Dur :  98 ms       QT Int : 402 ms       P-R-T Axes :  86  78 -28 degrees     QTcB Int : 427 ms     Sinus rhythm with Premature atrial complexes   ST and T wave abnormality, consider lateral ischemia   Abnormal ECG   When compared with ECG of 28-Feb-2025 16:20,   No significant change was found   Confirmed by Danny Mcknight ([id number]) on 3/1/2025 8:23:59 AM         .12/2024    Left Ventricle: The left ventricle is normal in size. Ventricular mass is normal. Normal wall thickness. Normal wall motion. There is normal systolic function with a visually estimated ejection fraction of 60 - 65%. Diastolic function cannot be reliably determined in the presence of mitral annular calcification. Normal left ventricular filling pressure.    Right Ventricle: Normal right ventricular  cavity size. Wall thickness is normal. Right ventricle wall motion  is normal. Systolic function is normal.    Left Atrium: Left atrium is severely dilated.    Right Atrium: Right atrium is moderately dilated.    Aortic Valve: There is moderate aortic valve sclerosis. There is moderate annular calcification present. Mildly restricted motion. There is mild stenosis. Aortic valve area by VTI is 1.6 cm2. Aortic valve peak velocity is 2.6 m/s. Mean gradient is 18.7 mmHg. The dimensionless index is 0.48. There is mild aortic regurgitation with a centrally directed jet. AV morphology not well seen. Non and left coronary leaflet is calcified and restricted.    Mitral Valve: There is severe posterior mitral annular calcification present.    Aorta: Aortic root is normal in size measuring 3.45 cm. Ascending aorta is normal measuring 2.78 cm.    Pulmonary Artery: There is mild pulmonary hypertension. The estimated pulmonary artery systolic pressure is 38 mmHg.    IVC/SVC: Normal venous pressure at 3 mmHg.    Pericardium: There is no pericardial effusion.      9/2023    There is a small sized, mild intensity apical inferior and inferoseptal reversible perfusion abnormality involving 6% of LV myocardium indicative of focal coronary stenosis.    Within the perfusion abnormality, absolute myocardial perfusion (cc/min/gm) averaged 0.56 cc/min/g at rest, 0.71 cc/min/g at stress and CFR was 1.27 , which equates to severely reduced coronary flow capacity within the LAD territory.    There are no other significant perfusion abnormalities.    The whole heart absolute myocardial perfusion values averaged 0.74 cc/min/g at rest, which is normal; 1.17 cc/min/g at stress, which is mildly reduced; and CFR is 1.59 , which is mildly reduced.    CT attenuation images demonstrate severe diffuse coronary calcifications in the LAD, and moderate diffuse coronary calcifications in the LCX and RCA territory and mild diffuse aortic calcifications in  the descending aorta.    The gated perfusion images showed an ejection fraction of 53% at rest and 56% during stress. A normal ejection fraction is greater than 47%.    The wall motion is normal at rest and during stress.    The LV cavity size is mildly enlarged at rest and stress.    The ECG portion of the study is abnormal but not diagnostic due to resting ST-T abnormalities.    There were no arrhythmias during stress.    The patient reported chest pain during the stress test.    There are no prior studies for comparison.

## 2025-03-05 ENCOUNTER — ANESTHESIA (OUTPATIENT)
Dept: SURGERY | Facility: HOSPITAL | Age: 70
End: 2025-03-05
Payer: MEDICARE

## 2025-03-05 ENCOUNTER — HOSPITAL ENCOUNTER (OUTPATIENT)
Facility: HOSPITAL | Age: 70
Discharge: HOME | End: 2025-03-05
Attending: SURGERY | Admitting: SURGERY
Payer: MEDICARE

## 2025-03-05 ENCOUNTER — PATIENT OUTREACH (OUTPATIENT)
Dept: ADMINISTRATIVE | Facility: CLINIC | Age: 70
End: 2025-03-05
Payer: MEDICARE

## 2025-03-05 VITALS
DIASTOLIC BLOOD PRESSURE: 63 MMHG | OXYGEN SATURATION: 98 % | WEIGHT: 186.94 LBS | SYSTOLIC BLOOD PRESSURE: 129 MMHG | HEIGHT: 73 IN | BODY MASS INDEX: 24.77 KG/M2 | TEMPERATURE: 98 F | HEART RATE: 55 BPM | RESPIRATION RATE: 17 BRPM

## 2025-03-05 DIAGNOSIS — T82.858D STENOSIS OF ARTERIOVENOUS DIALYSIS FISTULA, SUBSEQUENT ENCOUNTER: Primary | ICD-10-CM

## 2025-03-05 DIAGNOSIS — T82.858A AV FISTULA STENOSIS: ICD-10-CM

## 2025-03-05 PROCEDURE — 25500020 PHARM REV CODE 255: Mod: HCNC | Performed by: SURGERY

## 2025-03-05 PROCEDURE — 63600175 PHARM REV CODE 636 W HCPCS: Mod: HCNC | Performed by: NURSE ANESTHETIST, CERTIFIED REGISTERED

## 2025-03-05 PROCEDURE — C1769 GUIDE WIRE: HCPCS | Mod: HCNC | Performed by: SURGERY

## 2025-03-05 PROCEDURE — C1894 INTRO/SHEATH, NON-LASER: HCPCS | Mod: HCNC | Performed by: SURGERY

## 2025-03-05 PROCEDURE — C1725 CATH, TRANSLUMIN NON-LASER: HCPCS | Mod: HCNC | Performed by: SURGERY

## 2025-03-05 PROCEDURE — 71000044 HC DOSC ROUTINE RECOVERY FIRST HOUR: Mod: HCNC | Performed by: SURGERY

## 2025-03-05 PROCEDURE — 37000008 HC ANESTHESIA 1ST 15 MINUTES: Mod: HCNC | Performed by: SURGERY

## 2025-03-05 PROCEDURE — 37000009 HC ANESTHESIA EA ADD 15 MINS: Mod: HCNC | Performed by: SURGERY

## 2025-03-05 PROCEDURE — 25000003 PHARM REV CODE 250: Mod: HCNC | Performed by: NURSE ANESTHETIST, CERTIFIED REGISTERED

## 2025-03-05 PROCEDURE — 63600175 PHARM REV CODE 636 W HCPCS: Mod: HCNC | Performed by: SURGERY

## 2025-03-05 PROCEDURE — C1874 STENT, COATED/COV W/DEL SYS: HCPCS | Mod: HCNC | Performed by: SURGERY

## 2025-03-05 PROCEDURE — 36000706: Mod: HCNC | Performed by: SURGERY

## 2025-03-05 PROCEDURE — 27201423 OPTIME MED/SURG SUP & DEVICES STERILE SUPPLY: Mod: HCNC | Performed by: SURGERY

## 2025-03-05 PROCEDURE — 71000015 HC POSTOP RECOV 1ST HR: Mod: HCNC | Performed by: SURGERY

## 2025-03-05 PROCEDURE — 25000003 PHARM REV CODE 250: Mod: HCNC

## 2025-03-05 PROCEDURE — 36000707: Mod: HCNC | Performed by: SURGERY

## 2025-03-05 DEVICE — GORE VIABAHN ENDOPROSTHESIS WITH HEPARIN 9MMX7.5CM 8FR 120CMCATH
Type: IMPLANTABLE DEVICE | Site: ARM | Status: FUNCTIONAL
Brand: GORE VIABAHN ENDOPROSTHESIS WITH HEPARIN

## 2025-03-05 RX ORDER — FENTANYL CITRATE 50 UG/ML
25-200 INJECTION, SOLUTION INTRAMUSCULAR; INTRAVENOUS
Status: DISCONTINUED | OUTPATIENT
Start: 2025-03-05 | End: 2025-03-05 | Stop reason: HOSPADM

## 2025-03-05 RX ORDER — GLUCAGON 1 MG
1 KIT INJECTION
Status: DISCONTINUED | OUTPATIENT
Start: 2025-03-05 | End: 2025-03-05 | Stop reason: HOSPADM

## 2025-03-05 RX ORDER — FENTANYL CITRATE 50 UG/ML
INJECTION, SOLUTION INTRAMUSCULAR; INTRAVENOUS
Status: DISCONTINUED | OUTPATIENT
Start: 2025-03-05 | End: 2025-03-05

## 2025-03-05 RX ORDER — LIDOCAINE HYDROCHLORIDE 10 MG/ML
INJECTION, SOLUTION INFILTRATION; PERINEURAL
Status: DISCONTINUED | OUTPATIENT
Start: 2025-03-05 | End: 2025-03-05 | Stop reason: HOSPADM

## 2025-03-05 RX ORDER — DEXMEDETOMIDINE HYDROCHLORIDE 100 UG/ML
INJECTION, SOLUTION INTRAVENOUS
Status: DISCONTINUED | OUTPATIENT
Start: 2025-03-05 | End: 2025-03-05

## 2025-03-05 RX ORDER — NITROGLYCERIN 20 MG/100ML
INJECTION INTRAVENOUS
Status: COMPLETED | OUTPATIENT
Start: 2025-03-05 | End: 2025-03-05

## 2025-03-05 RX ORDER — SODIUM CHLORIDE 0.9 % (FLUSH) 0.9 %
10 SYRINGE (ML) INJECTION
Status: DISCONTINUED | OUTPATIENT
Start: 2025-03-05 | End: 2025-03-05 | Stop reason: HOSPADM

## 2025-03-05 RX ORDER — HEPARIN SODIUM 1000 [USP'U]/ML
INJECTION, SOLUTION INTRAVENOUS; SUBCUTANEOUS
Status: DISCONTINUED | OUTPATIENT
Start: 2025-03-05 | End: 2025-03-05

## 2025-03-05 RX ORDER — SODIUM CHLORIDE 0.9 % (FLUSH) 0.9 %
3 SYRINGE (ML) INJECTION
Status: DISCONTINUED | OUTPATIENT
Start: 2025-03-05 | End: 2025-03-05 | Stop reason: HOSPADM

## 2025-03-05 RX ORDER — MIDAZOLAM HYDROCHLORIDE 1 MG/ML
.5-4 INJECTION, SOLUTION INTRAMUSCULAR; INTRAVENOUS
Status: DISCONTINUED | OUTPATIENT
Start: 2025-03-05 | End: 2025-03-05 | Stop reason: HOSPADM

## 2025-03-05 RX ORDER — HEPARIN SOD,PORCINE/0.9 % NACL 1000/500ML
INTRAVENOUS SOLUTION INTRAVENOUS
Status: DISCONTINUED | OUTPATIENT
Start: 2025-03-05 | End: 2025-03-05 | Stop reason: HOSPADM

## 2025-03-05 RX ORDER — MUPIROCIN 20 MG/G
OINTMENT TOPICAL
Status: DISCONTINUED | OUTPATIENT
Start: 2025-03-05 | End: 2025-03-05 | Stop reason: HOSPADM

## 2025-03-05 RX ORDER — LIDOCAINE HYDROCHLORIDE 10 MG/ML
1 INJECTION, SOLUTION EPIDURAL; INFILTRATION; INTRACAUDAL; PERINEURAL ONCE
Status: DISCONTINUED | OUTPATIENT
Start: 2025-03-05 | End: 2025-03-05 | Stop reason: HOSPADM

## 2025-03-05 RX ORDER — IODIXANOL 320 MG/ML
INJECTION, SOLUTION INTRAVASCULAR
Status: DISCONTINUED | OUTPATIENT
Start: 2025-03-05 | End: 2025-03-05 | Stop reason: HOSPADM

## 2025-03-05 RX ADMIN — DEXMEDETOMIDINE 4 MCG: 100 INJECTION, SOLUTION, CONCENTRATE INTRAVENOUS at 08:03

## 2025-03-05 RX ADMIN — FENTANYL CITRATE 50 MCG: 50 INJECTION, SOLUTION INTRAMUSCULAR; INTRAVENOUS at 08:03

## 2025-03-05 RX ADMIN — MUPIROCIN: 20 OINTMENT TOPICAL at 06:03

## 2025-03-05 RX ADMIN — HEPARIN SODIUM 5000 UNITS: 1000 INJECTION, SOLUTION INTRAVENOUS; SUBCUTANEOUS at 09:03

## 2025-03-05 RX ADMIN — DEXMEDETOMIDINE 4 MCG: 100 INJECTION, SOLUTION, CONCENTRATE INTRAVENOUS at 09:03

## 2025-03-05 RX ADMIN — SODIUM CHLORIDE: 9 INJECTION, SOLUTION INTRAVENOUS at 08:03

## 2025-03-05 RX ADMIN — DEXMEDETOMIDINE 8 MCG: 100 INJECTION, SOLUTION, CONCENTRATE INTRAVENOUS at 08:03

## 2025-03-05 NOTE — ANESTHESIA POSTPROCEDURE EVALUATION
Anesthesia Post Evaluation    Patient: Ibrahima Phelps    Procedure(s) Performed: Procedure(s) (LRB):  FISTULOGRAM, WITH PTA (Left)  STENT, PERIPHERAL GRAFT (Left)    Final Anesthesia Type: MAC      Patient location during evaluation: PACU  Patient participation: Yes- Able to Participate  Level of consciousness: awake and alert  Post-procedure vital signs: reviewed and stable  Pain management: adequate  Airway patency: patent    PONV status at discharge: No PONV  Anesthetic complications: no      Cardiovascular status: blood pressure returned to baseline  Respiratory status: unassisted  Hydration status: euvolemic  Follow-up not needed.              Vitals Value Taken Time   /63 03/05/25 10:15   Temp 36.5 °C (97.7 °F) 03/05/25 09:40   Pulse 56 03/05/25 10:17   Resp 13 03/05/25 10:17   SpO2 99 % 03/05/25 10:17   Vitals shown include unfiled device data.      No case tracking events are documented in the log.      Pain/Stephane Score: Stephane Score: 10 (3/5/2025 10:15 AM)

## 2025-03-05 NOTE — BRIEF OP NOTE
[facility] - Surgery (2nd Fl)  Brief Operative Note    Surgery Date: 3/5/2025     Surgeons and Role:     * JUANI Melendez III, MD - Primary     * Han Acosta MD - Fellow    Assisting Surgeon: None    Pre-op Diagnosis:  AV fistula stenosis, subsequent encounter [T82.858D]    Post-op Diagnosis:  Post-Op Diagnosis Codes:     * AV fistula stenosis, subsequent encounter [T82.858D]    PROCEDURES:    Covered stent placement, L cephalic arch (9x 7.5 viabaun)  PTA, richy-anastamotic AVF (6x40 DOrado)  PTA, mid AVF (8x40 [place])  Antegrade and retrograde access, L fistulagram    Anesthesia: Local MAC    Operative Findings: >80% stenosis in all locations, < 20% after  Pseudo-lesion in mid AVF due to very tortuous vessel and wire, better after nitro and wire removal    Estimated Blood Loss: 4cc         Specimens:   Specimen (24h ago, onward)      None              Discharge Note    OUTCOME: successful outpatient procedure     DISPOSITION: home    FINAL DIAGNOSIS:  stenosis AVF    FOLLOWUP: 7-10 days with AVF duplex    DISCHARGE INSTRUCTIONS:  see below

## 2025-03-05 NOTE — TRANSFER OF CARE
"Anesthesia Transfer of Care Note    Patient: Ibrahima Phelps    Procedure(s) Performed: Procedure(s) (LRB):  FISTULOGRAM, WITH PTA (Left)  STENT, PERIPHERAL GRAFT (Left)    Patient location: PACU    Anesthesia Type: general    Transport from OR: Transported from OR on room air with adequate spontaneous ventilation    Post pain: adequate analgesia    Post assessment: no apparent anesthetic complications and tolerated procedure well    Post vital signs: stable    Level of consciousness: awake and alert    Nausea/Vomiting: no nausea/vomiting    Complications: none    Transfer of care protocol was followed    Last vitals: Visit Vitals  BP (!) 152/65   Pulse (!) 55   Temp 36.5 °C (97.7 °F) (Temporal)   Resp 11   Ht 6' 1" (1.854 m)   Wt 84.8 kg (186 lb 15.2 oz)   SpO2 100%   BMI 24.67 kg/m²     "

## 2025-03-05 NOTE — OP NOTE
OPERATIVE REPORT    Patient: Ibrahima Phelps  Surgery Date: 03/05/2025  Surgeons and Role:     * JUANI Melendez III, MD - Primary     * Han Acosta MD - Fellow     Assisting Surgeon: None     Pre-op Diagnosis:  AV fistula stenosis, subsequent encounter [T82.858D]     Post-op Diagnosis:  Post-Op Diagnosis Codes:     * AV fistula stenosis, subsequent encounter [T82.858D]     PROCEDURES:     Covered stent placement, L cephalic arch (9x 7.5 viabaun)  PTA, richy-anastamotic AVF (6x40 DOrado)  PTA, mid AVF (8x40 [place])  Antegrade and retrograde access, L fistulagram     Anesthesia: Local MAC     Operative Findings: >80% stenosis in all locations, < 20% after  Pseudo-lesion in mid AVF due to very tortuous vessel and wire, better after nitro and wire removal     Estimated Blood Loss: 4cc    Complications: None    Indications for Procedure:  Ibrahima Phelps is a 69 y.o. male who presented with dialysis access malfunction. The patient was offered a fistulagram with possible intervention. All risks, benefits, and alternatives were discussed and the patient understood and wished to proceed and gave written consent.     Operative Details:   The patient was brought to the operating room and placed in the operating room table in supine position.  He underwent sedation provided by anesthesia.  Left upper extremity was prepped and draped in the usual sterile fashion.  A time-out was performed.      The midportion of the AV fistula was accessed in an antegrade fashion towards the proximal anastomosis given the suspected stenosis of the proximal AV fistula.  This was accessed with a micropuncture needle, followed by a Mandril wire, followed by 4 [place] of the [place] micropuncture sheath.  Fistulogram confirmed a tight greater than 80% proximal AVF stenosis.  Based on this, decision made to intervene.  A floppy glidewire was then advanced past the anastomotic stricture.  A 6 [place] of [place] short sheath was placed.  The proximal fistula was treated with a 6 x  40 mm Independence balloon, which was held to the nominal pressure for 2 minutes.  Follow-up angiogram demonstrated resolution of the lesion, with less than 20% residual.  After this, a Monocryl U-stitch was placed, and the short sheath was removed.    After successful restoration of inflow, the patient then had a pulsatile thrill in his AV fistula.  The AV fistula was then accessed in the standard retrograde fashion in a similar manner, and a 6 [place] short sheath was placed.  There was a greater than 80% stenosis in the mid AV fistula, as well as in the cephalic arch.  The cephalic arch stenosis was treated with a initially a 7 x 60 mm Ultraverse balloon.  There was a significant waist present.  The 7 mm balloon was additionally used to treat the mid AV fistula.  These were held to the nominal pressures for 2 minutes.  Follow-up angiogram demonstrated persistent stenosis of the cephalic arch, so a 9 x 7.5 cm Viabahn was then placed, post dilated with an 8 mm balloon.  Follow-up angiogram demonstrated resolution of the lesions, with less than 20% residual stenosis.  Of note, there was a pseudo lesion of the mid AV fistula that was secondary to the tortuous vessel with wire placement, significantly improved after nitro administration.     This concluded the intervention.  The patient was then liberated from anesthesia and brought to the recovery room in stable condition.    Dr. eMlendez was present and scrubbed for all aspects of the case.    All instrument and sponge counts were confirmed correct. The family was notified of the results of the surgery afterwards.

## 2025-03-11 ENCOUNTER — HOSPITAL ENCOUNTER (OUTPATIENT)
Dept: VASCULAR SURGERY | Facility: CLINIC | Age: 70
Discharge: HOME | End: 2025-03-11
Attending: SURGERY
Payer: MEDICARE

## 2025-03-11 ENCOUNTER — OFFICE VISIT (OUTPATIENT)
Dept: VASCULAR SURGERY | Facility: CLINIC | Age: 70
End: 2025-03-11
Attending: SURGERY
Payer: MEDICARE

## 2025-03-11 VITALS
BODY MASS INDEX: 25.56 KG/M2 | WEIGHT: 192.88 LBS | OXYGEN SATURATION: 93 % | SYSTOLIC BLOOD PRESSURE: 121 MMHG | HEIGHT: 73 IN | DIASTOLIC BLOOD PRESSURE: 71 MMHG | HEART RATE: 64 BPM

## 2025-03-11 DIAGNOSIS — Z99.2 ESRD (END STAGE RENAL DISEASE) ON DIALYSIS: ICD-10-CM

## 2025-03-11 DIAGNOSIS — Z99.2 ESRD (END STAGE RENAL DISEASE) ON DIALYSIS: Primary | ICD-10-CM

## 2025-03-11 DIAGNOSIS — N18.6 ESRD (END STAGE RENAL DISEASE) ON DIALYSIS: ICD-10-CM

## 2025-03-11 DIAGNOSIS — N18.6 ESRD (END STAGE RENAL DISEASE) ON DIALYSIS: Primary | ICD-10-CM

## 2025-03-11 PROCEDURE — 93990 DOPPLER FLOW TESTING: CPT | Mod: HCNC,S$GLB,, | Performed by: SURGERY

## 2025-03-11 PROCEDURE — 99999 PR PBB SHADOW E&M-EST. PATIENT-LVL IV: CPT | Mod: PBBFAC,HCNC,, | Performed by: SURGERY

## 2025-03-11 NOTE — PROGRESS NOTES
VASCULAR SURGERY SERVICE    REFERRING DOCTOR: Emre Latif MD    CHIEF COMPLAINT: CKD 5    HISTORY OF PRESENT ILLNESS: Ibrahima Phelps is a 69 y.o. male status post 2 kidney transplantations the last in 2005, now with the client renal function with a GFR of 9.7 not yet initiated hemodialysis.  He is right-handed.  He would a Veronica AV fistula in the left many many years ago.    S/p    1a.  Covered stent placement, left cephalic arch, and antegrade access and para anastomotic angioplasty (6 x 40 [place]) 03/05/2025  PTA, left AV fistula 6/3/2024, 02/19/2024  left brachiocephalic AV fistula creation 12/18/2023    He is taking Eliquis for atrial fibrillation.    He is no history of AICD or pacemaker placement    02/06/2024:  This is initial follow-up visit status post left brachiocephalic AV fistula creation 12/18/2023.  He has not initiated hemodialysis.  Denies any hand pain weakness or numbness.  This fistula was felt likely that he had a outflow vein stenosis in the cephalic vein    03/22/2024:  This is postoperative appointment after angioplasty of his left AV fistula 1 month ago.  He subsequently initiated hemodialysis proximally 3 weeks ago.  He would 1 episode of infiltration last week but none since    05/24/2024:  Now presents for follow-up.  Since then notes that his hemodialysis has been working through his fistula without any sort of issues.  No more issues of infiltration, heavy bleeding.  Notes that the hematoma that was present prior in March as still been there, largely unchanged.    06/07/2024:  He now returns after recent fistulogram.  This was prompted by a large mid pseudoaneurysm.  However when he presented with a fistulogram the very pulsatile pseudoaneurysms was largely nonpulsatile.  The fistulogram revealed only trace filling of the pseudoaneurysms and thus I did not place a covered stent.  However there were 2-3 areas of outflow stenosis of the cephalic arch which I treated.    He has  having no problems using the fistula    10/11/2024:  This is a four-month follow-up.  He is not having any troubles using the fistula.  He is on Eliquis 5 mg p.o. b.i.d. for AFib and Plavix for PCI done earlier this year.  Not surprisingly it takes a little longer for his needle sticks to stop bleeding, but this is been stable for the last 3-4 months    02/21/2025:  Now returns with low flows    03/11/2025; he now returns after recent intervention.  He is using the fistula without difficulty      Past Medical History:   Diagnosis Date    Atrial fibrillation     CAD (coronary artery disease) 2006    MI in 2006    CHF (congestive heart failure) 2017    CKD (chronic kidney disease) stage 3, GFR 30-59 ml/min     Dr. Latif.  in transplanted kidney    CKD (chronic kidney disease) stage 5, GFR less than 15 ml/min 02/02/2024    COVID-19 02/07/2023    Diverticulosis     Hyperlipidemia     Hypertension     Keloid cicatrix     Metabolic bone disease     Other emphysema 10/01/2019    Pericarditis     S/P kidney transplant 1992    x2 (1992 & 2005),    Thrombocytopenia     Thyroid disease     Tobacco use 09/05/2014       Past Surgical History:   Procedure Laterality Date    AV FISTULA PLACEMENT Left 12/18/2023    Procedure: CREATION, AV FISTULA;  Surgeon: JUANI Melendez III, MD;  Location: [facility] OR [address] [place] [address];  Service: Vascular;  Laterality: Left;  LUE AVF creation vs AVG insertion    CARDIOVERSION  04/30/15    CHOLECYSTECTOMY      COLONOSCOPY  April 20, 2011    Diverticulosis, repeat recommended in 3 yrs., repeat colonoscopy 2014 revealed 2 polyps.  He should return in 5 years.    COLONOSCOPY N/A 3/13/2020    Procedure: COLONOSCOPY;  Surgeon: Chon Casper MD;  Location: [facility] (4TH FLR);  Service: Endoscopy;  Laterality: N/A;  ok to hold coumadin x5days-see telephone encounter 2/4/20-tb    COLONOSCOPY N/A 2/4/2021    Procedure: COLONOSCOPY;  Surgeon: Chon Casper MD;  Location: [facility] (4TH FLR);  Service:  "Endoscopy;  Laterality: N/A;  Eliquis - per Dr. GAVIN Blunt ok to hold x 2 days and "restarted asap"- ERW  last prep "poor", [id number] ordered/ Pt requests Dr. Casper  prep ins. mailed - COVID screening on 2/1/21 PCW- ERW    COLONOSCOPY N/A 3/3/2021    Procedure: COLONOSCOPY;  Surgeon: Chon Casper MD;  Location: [facility] (4TH FLR);  Service: Endoscopy;  Laterality: N/A;  Patient with his second poor bowel pre and has poor prep today.  If patient not intersted or can't get colonoscopy tomorrow will need constipation bowel prep and will need to restart his Eliquis today.Thanks,Chon     per Dr Blunt-ok to hold Eliquis 2 days prior      COVID     COLONOSCOPY N/A 12/6/2024    Procedure: COLONOSCOPY;  Surgeon: Chon Casper MD;  Location: [facility] (2ND FLR);  Service: Endoscopy;  Laterality: N/A;  Plavix-Eliquis pending/ HD T-Th-Sat- lab ordered  2/25/24 ECHO PA 49  ref by / suprep inst portal-RN  11/29-message to clearance nurse for plavix and eliquis hold-tb  ok to hold Eliquis 2 days per Dr Parmar  ok to hold Plavix 5 days per MEENA CUNNINGHAM-GT  12/2- p    CORONARY ANGIOGRAPHY N/A 2/28/2024    Procedure: ANGIOGRAM, CORONARY ARTERY;  Surgeon: Tylor Lincoln MD;  Location: [facility] CATH LAB;  Service: Cardiology;  Laterality: N/A;    CORONARY ANGIOPLASTY WITH STENT PLACEMENT  9/13/2006    FISTULOGRAM N/A 2/19/2024    Procedure: Fistulogram;  Surgeon: JUANI Melendez III, MD;  Location: [facility] CATH LAB;  Service: Peripheral Vascular;  Laterality: N/A;    FISTULOGRAM, WITH PTA Left 6/3/2024    Procedure: FISTULOGRAM, WITH PTA;  Surgeon: JUANI Melendez III, MD;  Location: [facility] OR 2ND FLR;  Service: Vascular;  Laterality: Left;  mGy: 51.58  GyCm2: 6.8285  Fluoro time: 5.1 min  Contrast: 28 cc    FISTULOGRAM, WITH PTA Left 3/5/2025    Procedure: FISTULOGRAM, WITH PTA;  Surgeon: JUANI Melendez III, MD;  Location: [facility] OR [address] [place] [address];  Service: Vascular;  Laterality: Left;    IRRIGATION AND DEBRIDEMENT Right " 8/30/2023    Procedure: IRRIGATION AND DEBRIDEMENT, RIGHT MIDDLE FINGER;  Surgeon: Martin Larsen MD;  [facility];  Service: Orthopedics;  Laterality: Right;    KIDNEY TRANSPLANT  2005    LEFT HEART CATHETERIZATION N/A 2/26/2024    Procedure: Left heart cath;  Surgeon: Tylor Lincoln MD;  Location: [facility] CATH LAB;  Service: Cardiology;  Laterality: N/A;    PARATHYROIDECTOMY      PERCUTANEOUS TRANSLUMINAL ANGIOPLASTY OF ARTERIOVENOUS FISTULA Left 2/19/2024    Procedure: PTA, AV FISTULA;  Surgeon: JUANI Melendez III, MD;  Location: [facility] CATH LAB;  Service: Peripheral Vascular;  Laterality: Left;    STENT, DRUG ELUTING, SINGLE VESSEL, CORONARY N/A 2/28/2024    Procedure: Stent, Drug Eluting, Single Vessel, Coronary;  Surgeon: Tylor Lincoln MD;  Location: [facility] CATH LAB;  Service: Cardiology;  Laterality: N/A;    STENT, PERIPHERAL GRAFT Left 3/5/2025    Procedure: STENT, PERIPHERAL GRAFT;  Surgeon: JUANI Melendez III, MD;  [facility];  Service: Vascular;  Laterality: Left;  mGy 63.17  Gycm2 8.3386  flouro 63.17 min  contrast 38 ml    TREATMENT OF CARDIAC ARRHYTHMIA N/A 3/28/2022    Procedure: CARDIOVERSION;  Surgeon: Migue Blunt MD;  Location: [facility] EP LAB;  Service: Cardiology;  Laterality: N/A;  AF, DCC, MAC, GP, 3 PREP*RODRIGO deferred pt 100% compliant*    VENOPLASTY  6/3/2024    Procedure: ANGIOPLASTY, VEIN;  Surgeon: JUANI Melendez III, MD;  [facility];  Service: Vascular;;  Left subclavian         Current Outpatient Medications:     doxercalciferol (HECTOROL IV), 3 mcg., Disp: , Rfl:     acetaminophen (TYLENOL) 500 MG tablet, Take 1 tablet (500 mg total) by mouth every 6 (six) hours as needed for Pain., Disp: 20 tablet, Rfl: 0    albuterol (VENTOLIN HFA) 90 mcg/actuation inhaler, Inhale 2 puffs into the lungs every 6 (six) hours as needed for Wheezing or Shortness of Breath. Rescue, Disp: 18 g, Rfl: 3    albuterol-ipratropium (DUO-NEB) 2.5 mg-0.5  mg/3 mL nebulizer solution, Take 3 mLs by nebulization every 4 (four) hours as needed for Wheezing. Rescue, Disp: 75 mL, Rfl: 0    amiodarone (PACERONE) 200 MG Tab, TAKE 1 TABLET BY MOUTH EVERY DAY, Disp: 90 tablet, Rfl: 3    atorvastatin (LIPITOR) 80 MG tablet, Take 1 tablet (80 mg total) by mouth once daily., Disp: 90 tablet, Rfl: 1    b complex vitamins (B COMPLEX-VITAMIN B12) tablet, Take 1 tablet by mouth once daily., Disp: 90 tablet, Rfl: 3    calcium carbonate (CALCIUM 600 ORAL), Take 1 tablet by mouth 2 (two) times a day., Disp: , Rfl:     clopidogreL (PLAVIX) 75 mg tablet, Take 1 tablet (75 mg total) by mouth once daily., Disp: 90 tablet, Rfl: 3    doxazosin (CARDURA) 4 MG tablet, Take 1 tablet (4 mg total) by mouth every 12 (twelve) hours., Disp: 180 tablet, Rfl: 3    ELIQUIS 5 mg Tab, TAKE 1 TABLET BY MOUTH TWICE A DAY, Disp: 60 tablet, Rfl: 32    famotidine (PEPCID) 20 MG tablet, TAKE 1 TABLET (20 MG TOTAL) BY MOUTH ONCE DAILY. ON DIALYSIS DAYS, PLEASE TAKE THIS MEDICINE AFTER DIALYSIS., Disp: 90 tablet, Rfl: 1    fexofenadine HCl (ALLEGRA ORAL), Take 1 tablet by mouth daily as needed (allergies)., Disp: , Rfl:     fluticasone furoate-vilanteroL (BREO) 100-25 mcg/dose diskus inhaler, Inhale 1 puff into the lungs once daily. Controller. Use this inhaler every day., Disp: , Rfl:     fluticasone propionate (FLONASE) 50 mcg/actuation nasal spray, 1 spray (50 mcg total) by Each Nostril route daily as needed for Allergies., Disp: , Rfl:     gabapentin (NEURONTIN) 300 MG capsule, Take 1 capsule (300 mg total) by mouth every evening., Disp: 90 capsule, Rfl: 3    ipratropium (ATROVENT) 21 mcg (0.03 %) nasal spray, 2 sprays by Each Nostril route 3 (three) times daily., Disp: 30 mL, Rfl: 0    multivit-min-FA-lycopen-lutein (CENTRUM SILVER) 0.4 mg-300 mcg- 250 mcg Tab, Take 1 tablet by mouth once daily., Disp: , Rfl:     multivitamin (THERAGRAN) per tablet, Take 1 tablet by mouth Daily., Disp: , Rfl:      nitroGLYCERIN (NITROSTAT) 0.4 MG SL tablet, Place 1 tablet (0.4 mg total) under the tongue every 5 (five) minutes as needed for Chest pain., Disp: 25 tablet, Rfl: 0    omeprazole (PRILOSEC) 20 MG capsule, Take 1 capsule (20 mg total) by mouth daily as needed (heartburn)., Disp: , Rfl:     pulse oximeter (PULSE OXIMETER) device, by Apply Externally route 2 (two) times a day. Use twice daily at 8 AM and 3 PM and record the value in MyChart as directed., Disp: 1 each, Rfl: 0    tacrolimus (PROGRAF) 1 MG Cap, Take 3 capsules (3 mg total) by mouth every 12 (twelve) hours for 60 days, THEN 2 capsules (2 mg total) every 12 (twelve) hours for 60 days, THEN 1 capsule (1 mg total) every 12 (twelve) hours for 60 days, THEN 1 capsule (1 mg total) once daily., Disp: 540 capsule, Rfl: 4    torsemide (DEMADEX) 20 MG Tab, Take 2 tablets (40 mg total) by mouth 3 (three) times a week. (Patient taking differently: Take 40 mg by mouth 3 (three) times a week. Pt reports taking 40 mg BID), Disp: 90 tablet, Rfl: 3    Review of patient's allergies indicates:   Allergen Reactions    Ace inhibitors Swelling    Verapamil Other (See Comments)     Other reaction(s): Unknown    Povidone-iodine Itching       Family History   Problem Relation Name Age of Onset    No Known Problems Sister      No Known Problems Brother      Thyroid disease Mother          s/p surgery    Heart disease Father          had pacemaker    No Known Problems Sister      Kidney failure Sister      Kidney disease Sister      No Known Problems Sister      Kidney disease Brother      Kidney failure Brother          s/p transplant    Diabetes Mellitus Neg Hx      Stroke Neg Hx      Heart attack Neg Hx      Cancer Neg Hx      Celiac disease Neg Hx      Cirrhosis Neg Hx      Colon cancer Neg Hx      Esophageal cancer Neg Hx      Inflammatory bowel disease Neg Hx      Rectal cancer Neg Hx      Stomach cancer Neg Hx      Ulcerative colitis Neg Hx      Liver disease Neg Hx       "Liver cancer Neg Hx      Crohn's disease Neg Hx      Melanoma Neg Hx         Social History     Tobacco Use    Smoking status: Former     Current packs/day: 0.00     Average packs/day: 0.5 packs/day for 40.0 years (20.0 ttl pk-yrs)     Types: Cigarettes     Start date: 2/28/1982     Quit date: 2/28/2022     Years since quitting: 3.0    Smokeless tobacco: Never    Tobacco comments:     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     Patient is currently retired   Substance Use Topics    Alcohol use: No    Drug use: No       PHYSICAL EXAM:   /71 (BP Location: Right arm, Patient Position: Sitting)   Pulse 64   Ht 6' 1" (1.854 m)   Wt 87.5 kg (192 lb 14.4 oz)   SpO2 (!) 93%   BMI 25.45 kg/m²   Constitutional:  Alert,   Well-appearing  In no distress.   Neurological: Normal speech  no focal findings  CN II - XII grossly intact.    Psychiatric: Mood and affect appropriate and symmetric.   HEENT: Normocephalic / atraumatic  PERRLA  Midline trachea  No scars across the neck   Cardiac: Regular rate and rhythm.   Pulmonary: Normal pulmonary effort.   Abdomen: Soft, not distended.     Skin: Warm and well perfused.    Vascular:  Left radial pulses nonpalpable   Extremities/  Musculoskeletal: Left arm shows a now a good thrill in the left upper arm brachiocephalic fistula without pulsatility, clinically this is a marked improvement from prior     6/7/2024    IMAGING:  Duplex of the fistula shows a proximal velocity of 540, flow volume markedly improved to 2.3 L (prior 536 mils per minute)    Prior fistulogram was personally reviewed      IMPRESSION:  Markedly improved left AV fistula flow rates after tandem intervention as detailed above    PLAN:  Follow up in 6 months with AV fistula duplex, sooner if clinically indicated     INDIGO Melendez III, MD, FACS  Professor and Chief, Vascular and Endovascular Surgery              "

## 2025-03-12 ENCOUNTER — OFFICE VISIT (OUTPATIENT)
Dept: CARDIOLOGY | Facility: CLINIC | Age: 70
End: 2025-03-12
Payer: MEDICARE

## 2025-03-12 ENCOUNTER — OFFICE VISIT (OUTPATIENT)
Dept: OPTOMETRY | Facility: CLINIC | Age: 70
End: 2025-03-12
Payer: COMMERCIAL

## 2025-03-12 VITALS
BODY MASS INDEX: 25.39 KG/M2 | SYSTOLIC BLOOD PRESSURE: 104 MMHG | HEIGHT: 73 IN | WEIGHT: 191.56 LBS | DIASTOLIC BLOOD PRESSURE: 52 MMHG | HEART RATE: 72 BPM

## 2025-03-12 DIAGNOSIS — H10.13 CONJUNCTIVITIS, ALLERGIC, BILATERAL: ICD-10-CM

## 2025-03-12 DIAGNOSIS — H52.4 PRESBYOPIA: Primary | ICD-10-CM

## 2025-03-12 DIAGNOSIS — I65.29 STENOSIS OF CAROTID ARTERY, UNSPECIFIED LATERALITY: Chronic | ICD-10-CM

## 2025-03-12 DIAGNOSIS — E78.2 MIXED HYPERLIPIDEMIA: Chronic | ICD-10-CM

## 2025-03-12 DIAGNOSIS — Z99.2 ESRD (END STAGE RENAL DISEASE) ON DIALYSIS: ICD-10-CM

## 2025-03-12 DIAGNOSIS — I50.22 HEART FAILURE WITH MILDLY REDUCED EJECTION FRACTION (HFMREF): ICD-10-CM

## 2025-03-12 DIAGNOSIS — H30.143 RETINAL PIGMENT EPITHELIOPATHY OF BOTH EYES: ICD-10-CM

## 2025-03-12 DIAGNOSIS — I10 PRIMARY HYPERTENSION: Primary | ICD-10-CM

## 2025-03-12 DIAGNOSIS — Z87.891 FORMER SMOKER: ICD-10-CM

## 2025-03-12 DIAGNOSIS — I35.0 MILD AORTIC STENOSIS: ICD-10-CM

## 2025-03-12 DIAGNOSIS — I48.0 PAROXYSMAL ATRIAL FIBRILLATION: Chronic | ICD-10-CM

## 2025-03-12 DIAGNOSIS — N18.6 ESRD (END STAGE RENAL DISEASE) ON DIALYSIS: ICD-10-CM

## 2025-03-12 DIAGNOSIS — H25.13 NS (NUCLEAR SCLEROSIS), BILATERAL: ICD-10-CM

## 2025-03-12 DIAGNOSIS — I70.0 AORTIC ATHEROSCLEROSIS: ICD-10-CM

## 2025-03-12 DIAGNOSIS — I13.0 HYPERTENSIVE HEART AND RENAL DISEASE WITH RENAL FAILURE, STAGE 1 THROUGH STAGE 4 OR UNSPECIFIED CHRONIC KIDNEY DISEASE, WITH HEART FAILURE: ICD-10-CM

## 2025-03-12 PROCEDURE — 3288F FALL RISK ASSESSMENT DOCD: CPT | Mod: CPTII,S$GLB,, | Performed by: OPTOMETRIST

## 2025-03-12 PROCEDURE — 92015 DETERMINE REFRACTIVE STATE: CPT | Mod: S$GLB,,, | Performed by: OPTOMETRIST

## 2025-03-12 PROCEDURE — 1101F PT FALLS ASSESS-DOCD LE1/YR: CPT | Mod: CPTII,S$GLB,, | Performed by: OPTOMETRIST

## 2025-03-12 PROCEDURE — 1126F AMNT PAIN NOTED NONE PRSNT: CPT | Mod: CPTII,S$GLB,, | Performed by: OPTOMETRIST

## 2025-03-12 PROCEDURE — 92004 COMPRE OPH EXAM NEW PT 1/>: CPT | Mod: S$GLB,,, | Performed by: OPTOMETRIST

## 2025-03-12 PROCEDURE — 99999 PR PBB SHADOW E&M-EST. PATIENT-LVL IV: CPT | Mod: PBBFAC,HCNC,, | Performed by: INTERNAL MEDICINE

## 2025-03-12 PROCEDURE — 99999 PR PBB SHADOW E&M-EST. PATIENT-LVL III: CPT | Mod: PBBFAC,,, | Performed by: OPTOMETRIST

## 2025-03-12 NOTE — PATIENT INSTRUCTIONS
Assessment/Plan:  Ibrahima Phelps is a 69 y.o. male with CAD s/p PCI 2006, PCI to RCA w/ 2 DIEGO 2/28/2024, paroxysmal Afib (on amiodarone and Eliquis), HFpEF, PAD, S/P kidney transplant on immunosuppressive therapy with Tacrolimus and prednisone, COPD, smoking, who presents for a follow up appointment.    CAD s/p PCI 2006, PCI to RCA w/ 2 DIEGO 2/28/2024- Stable with no angina currently. He is no longer taking metoprolol succinate 25 mg daily or nifedipine 30 mg daily due to low blood pressures. Also, holding off on starting imdur due to low blood pressures. Continue Eliquis 5 mg bid, plavix 75 mg daily, and atorvastatin 80 mg daily.     2. Paroxysmal Afib- Continue amiodarone and Eliquis.    3. HFpEF- Stable.  Echo as per above. Continue current medications.     4. Overweight- Encourage diet, exercise, and weight loss.     Follow up in 6 months with lipids prior

## 2025-03-12 NOTE — PROGRESS NOTES
HPI    MRX: Readers 1 yr old  Gtt's:-    Ibrahima Phelps is a 69 y.o. male who comes in for routine eye exam .   Pt. States having trouble seeing at distance more than near.  Pt. Denies pain or any other ocular health complains today.    (--)blurred vision(+)Headaches(--)diplopia  (--)flashes(--)floaters(--)pain  (+)Itching(+)tearing(+)burning  (--)Dryness(--) OTC Drops(+)Photophobia    Hemoglobin A1C       Date                     Value               Ref Range             Status                06/11/2024               4.8                 4.0 - 5.6 %           Final              Last edited by Megan Miller on 3/12/2025 10:06 AM.            Assessment /Plan     For exam results, see Encounter Report.    Presbyopia   Rx specs    Retinal pigment epitheliopathy of both eyes   Inferior abnormal pigmentation OU    NS (nuclear sclerosis), bilateral   Mild, monitor    Conjunctivitis, allergic, bilateral   Start Pataday XS QAM/PRN    RTC 1 year

## 2025-03-12 NOTE — PROGRESS NOTES
"[facility]      Chief Complaint   Patient presents with    Hospital Follow Up    Hypertension    aortic atherosclerosis       Patient ID: Ibrahima Phelps is a 69 y.o. male with CAD s/p PCI 2006, PCI to RCA w/ 2 DIEGO 2/28/2024, paroxysmal Afib (on amiodarone and Eliquis), HFpEF, PAD, S/P kidney transplant on immunosuppressive therapy with Tacrolimus and prednisone, smoking, who presents for a follow up appointment.  Pertinent history/events are as follows:     -At our initial clinic visit on 7/20/2023, Mr. Phelps reports SOB on exertion which started in 12/2022.  He  reports no chest pain or chest discomfort.  He was recently started on amiodarone for Afib.  Smokes 5 cigarettes a day for 53 years.  EKG today shows sinus bradycardia with Premature atrial complexes with Aberrant conduction/LVH with repolarization abnormality.  Plan:  SOB on Exertion- Appears chronic based on chart review.  Pt with normal nuclear stress test in 4/2022.  Check PET stress test to rule out myocardial ischemia.  Paroxysmal Afib- Continue amiodarone and Eliquis.  HFpEF- Stable.  Check PET stress test to rule out myocardial ischemia.  Smoking- Encourage smoking cessation.  Overweight- Encourage diet, exercise, and weight loss.   Snoring- Check sleep study of evaluate for TONYA.     -Pt admitted 12/6-12/7/2024 with chest pain (details below as per discharge summary):  Hospital Course:   "69 y.o. male with CAD s/p stent placements (1 stent placed in 2006, 2 stents placed in 02/2024), CKD s/p renal transplant (in 1992) on hemodialysis (TTS), HTN, CHF presents to the ED w/ complaints of left sided pressure like CP that resolved with nitro. EKG (sinus bradycardia without ST/T wave changes) non ischemic without CP recurrence on admission. CXR normal. Cardiology consulted for elevated troponin and recommended to restart AC, Plavix and Statin . Cardiology initially recommended to start patient on imdur 30 mg daily but later recommended to " "not prescribe on discharge and to discuss with outpatient cardiology and nephrology teams as BP has been low at home per patient and family. Bedside TTE at baseline no WMA, formal echo with EF 60-65%. Patient to benefit from cardiology follow up as OP.    -TTE 12/7  with EF 60 - 65%. Diastolic function cannot be reliably determined in the presence of mitral annular calcification. LA severely dilated, RA moderately dilated. mild pHTn with PASP 38 mmHg"    -At follow up clinic visit on 12/9/2024, Mr. Phelps reported feeling better since hospital discharge. He has no chest pain or SOB. States blood pressure has been low at home at 111/53. Blood pressure in clinic today is 120/49.  States due to low blood pressures, he is not currently taking metoprolol succinate 25 mg daily or nifedipine 30 mg daily.  Plan:  CAD s/p PCI 2006, PCI to RCA w/ 2 DIEGO 2/28/2024- Stable with no angina currently. Holding off on start imdur due to low blood pressure. Continue Eliquis 5 mg bid, plavix 75 mg daily, and atorvastatin 80 mg daily.   Paroxysmal Afib- Continue amiodarone and Eliquis.  HFpEF- Stable.  Echo as per above. Continue current medications.   Overweight- Encourage diet, exercise, and weight loss.     HPI:  Mr. Phelps reports feeling well with no chest pain, SOB, palpitations, TIA symptoms or syncope. ON 3/1/2025, he was recently admitted for hyperkalemia. Pt was discharged from hospital and go to normal HD center for regularly scheduled session.   Blood pressure today is 104/52.     Past Medical History:   Diagnosis Date    Atrial fibrillation     CAD (coronary artery disease) 2006    MI in 2006    CHF (congestive heart failure) 2017    CKD (chronic kidney disease) stage 3, GFR 30-59 ml/min     Dr. Latif.  in transplanted kidney    CKD (chronic kidney disease) stage 5, GFR less than 15 ml/min 02/02/2024    COVID-19 02/07/2023    Diverticulosis     Hyperlipidemia     Hypertension     Keloid cicatrix     Metabolic bone disease  " "   Other emphysema 10/01/2019    Pericarditis     S/P kidney transplant 1992    x2 (1992 & 2005),    Thrombocytopenia     Thyroid disease     Tobacco use 09/05/2014     Past Surgical History:   Procedure Laterality Date    AV FISTULA PLACEMENT Left 12/18/2023    Procedure: CREATION, AV FISTULA;  Surgeon: JUANI Melendez III, MD;  Location: [facility] OR 2ND FLR;  Service: Vascular;  Laterality: Left;  LUE AVF creation vs AVG insertion    CARDIOVERSION  04/30/15    CHOLECYSTECTOMY      COLONOSCOPY  April 20, 2011    Diverticulosis, repeat recommended in 3 yrs., repeat colonoscopy 2014 revealed 2 polyps.  He should return in 5 years.    COLONOSCOPY N/A 3/13/2020    Procedure: COLONOSCOPY;  Surgeon: Chon Casper MD;  Location: [facility] (4TH FLR);  Service: Endoscopy;  Laterality: N/A;  ok to hold coumadin x5days-see telephone encounter 2/4/20-tb    COLONOSCOPY N/A 2/4/2021    Procedure: COLONOSCOPY;  Surgeon: Chon Casper MD;  Location: [facility] (4TH FLR);  Service: Endoscopy;  Laterality: N/A;  Eliquis - per Dr. GAVIN Blunt ok to hold x 2 days and "restarted asap"- ERW  last prep "poor", [id number] ordered/ Pt requests Dr. Casper  prep ins. mailed - COVID screening on 2/1/21 PCW- ERW    COLONOSCOPY N/A 3/3/2021    Procedure: COLONOSCOPY;  Surgeon: Chon Casper MD;  Location: [facility] (4TH FLR);  Service: Endoscopy;  Laterality: N/A;  Patient with his second poor bowel pre and has poor prep today.  If patient not intersted or can't get colonoscopy tomorrow will need constipation bowel prep and will need to restart his Eliquis today.Thanks,Chon Blunt-ok to hold Eliquis 2 days prior      COVID     COLONOSCOPY N/A 12/6/2024    Procedure: COLONOSCOPY;  Surgeon: Chon Casper MD;  Location: [facility] (2ND FLR);  Service: Endoscopy;  Laterality: N/A;  Plavix-Eliquis pending/ HD T-Th-Sat- lab ordered  2/25/24 ECHO PA 49  ref by / suprep inst portal-RN  11/29-message to clearance nurse for " plavix and eliquis hold-tb  ok to hold Eliquis 2 days per Dr Parmar  ok to hold Plavix 5 days per MEENA Jackson PA-GT  12/2- p    CORONARY ANGIOGRAPHY N/A 2/28/2024    Procedure: ANGIOGRAM, CORONARY ARTERY;  Surgeon: Tylor Lincoln MD;  Location: [facility] CATH LAB;  Service: Cardiology;  Laterality: N/A;    CORONARY ANGIOPLASTY WITH STENT PLACEMENT  9/13/2006    FISTULOGRAM N/A 2/19/2024    Procedure: Fistulogram;  Surgeon: JUANI Melendez III, MD;  Location: [facility] CATH LAB;  Service: Peripheral Vascular;  Laterality: N/A;    FISTULOGRAM, WITH PTA Left 6/3/2024    Procedure: FISTULOGRAM, WITH PTA;  Surgeon: JUANI Melendez III, MD;  Location: [facility] OR [facility] FLR;  Service: Vascular;  Laterality: Left;  mGy: 51.58  GyCm2: 6.8285  Fluoro time: 5.1 min  Contrast: 28 cc    FISTULOGRAM, WITH PTA Left 3/5/2025    Procedure: FISTULOGRAM, WITH PTA;  Surgeon: JUANI Melendez III, MD;  [facility];  Service: Vascular;  Laterality: Left;    IRRIGATION AND DEBRIDEMENT Right 8/30/2023    Procedure: IRRIGATION AND DEBRIDEMENT, RIGHT MIDDLE FINGER;  Surgeon: Martin Larsen MD;  Location: [facility] OR 2ND FLR;  Service: Orthopedics;  Laterality: Right;    KIDNEY TRANSPLANT  2005    LEFT HEART CATHETERIZATION N/A 2/26/2024    Procedure: Left heart cath;  Surgeon: Tylor Lincoln MD;  Location: [facility] CATH LAB;  Service: Cardiology;  Laterality: N/A;    PARATHYROIDECTOMY      PERCUTANEOUS TRANSLUMINAL ANGIOPLASTY OF ARTERIOVENOUS FISTULA Left 2/19/2024    Procedure: PTA, AV FISTULA;  Surgeon: JUANI Melendez III, MD;  Location: [facility] CATH LAB;  Service: Peripheral Vascular;  Laterality: Left;    STENT, DRUG ELUTING, SINGLE VESSEL, CORONARY N/A 2/28/2024    Procedure: Stent, Drug Eluting, Single Vessel, Coronary;  Surgeon: Tylor Lincoln MD;  Location: [facility] CATH LAB;  Service: Cardiology;  Laterality: N/A;    STENT, PERIPHERAL GRAFT Left 3/5/2025    Procedure: STENT, PERIPHERAL GRAFT;  Surgeon: JUANI Melendez  III, MD;  [facility];  Service: Vascular;  Laterality: Left;  mGy 63.17  Gycm2 8.3386  flouro 63.17 min  contrast 38 ml    TREATMENT OF CARDIAC ARRHYTHMIA N/A 3/28/2022    Procedure: CARDIOVERSION;  Surgeon: Migue Blunt MD;  Location: [facility] EP LAB;  Service: Cardiology;  Laterality: N/A;  AF, DCC, MAC, GP, 3 PREP*RODRIGO deferred pt 100% compliant*    VENOPLASTY  6/3/2024    Procedure: ANGIOPLASTY, VEIN;  Surgeon: JUANI Melendez III, MD;  [facility];  Service: Vascular;;  Left subclavian     Social History     Socioeconomic History    Marital status:    Occupational History    Occupation:    Tobacco Use    Smoking status: Former     Current packs/day: 0.00     Average packs/day: 0.5 packs/day for 40.0 years (20.0 ttl pk-yrs)     Types: Cigarettes     Start date: 2/28/1982     Quit date: 2/28/2022     Years since quitting: 3.0    Smokeless tobacco: Never    Tobacco comments:     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     Patient is currently retired   Substance and Sexual Activity    Alcohol use: No    Drug use: No    Sexual activity: Yes     Partners: Female   Social History Narrative    Retired      Social Drivers of Health     Financial Resource Strain: Low Risk  (12/13/2024)    Overall Financial Resource Strain (CARDIA)     Difficulty of Paying Living Expenses: Not hard at all   Food Insecurity: No Food Insecurity (12/13/2024)    Hunger Vital Sign     Worried About Running Out of Food in the Last Year: Never true     Ran Out of Food in the Last Year: Never true   Transportation Needs: No Transportation Needs (12/6/2024)    TRANSPORTATION NEEDS     Transportation : No   Physical Activity: Inactive (12/13/2024)    Exercise Vital Sign     Days of Exercise per Week: 0 days     Minutes of Exercise per Session: 0 min   Stress: No Stress Concern Present (12/13/2024)    [employer] [employer] -  Occupational Stress Questionnaire     Feeling of Stress : Not at all   Recent Concern: Stress - Stress Concern Present (12/6/2024)    [employer] - Occupational Stress Questionnaire     Feeling of Stress : To some extent   Housing Stability: Unknown (12/13/2024)    Housing Stability Vital Sign     Unable to Pay for Housing in the Last Year: No     Homeless in the Last Year: No     Family History   Problem Relation Name Age of Onset    No Known Problems Sister      No Known Problems Brother      Thyroid disease Mother          s/p surgery    Heart disease Father          had pacemaker    No Known Problems Sister      Kidney failure Sister      Kidney disease Sister      No Known Problems Sister      Kidney disease Brother      Kidney failure Brother          s/p transplant    Diabetes Mellitus Neg Hx      Stroke Neg Hx      Heart attack Neg Hx      Cancer Neg Hx      Celiac disease Neg Hx      Cirrhosis Neg Hx      Colon cancer Neg Hx      Esophageal cancer Neg Hx      Inflammatory bowel disease Neg Hx      Rectal cancer Neg Hx      Stomach cancer Neg Hx      Ulcerative colitis Neg Hx      Liver disease Neg Hx      Liver cancer Neg Hx      Crohn's disease Neg Hx      Melanoma Neg Hx         Review of patient's allergies indicates:   Allergen Reactions    Ace inhibitors Swelling    Iodine Itching    Verapamil Other (See Comments)     Other reaction(s): Unknown    Povidone-iodine Itching       Medication List with Changes/Refills   Current Medications    ACETAMINOPHEN (TYLENOL) 500 MG TABLET    Take 1 tablet (500 mg total) by mouth every 6 (six) hours as needed for Pain.    ALBUTEROL (VENTOLIN HFA) 90 MCG/ACTUATION INHALER    Inhale 2 puffs into the lungs every 6 (six) hours as needed for Wheezing or Shortness of Breath. Rescue    ALBUTEROL-IPRATROPIUM (DUO-NEB) 2.5 MG-0.5 MG/3 ML NEBULIZER SOLUTION    Take 3 mLs by nebulization every 4 (four) hours as needed for Wheezing. Rescue     AMIODARONE (PACERONE) 200 MG TAB    TAKE 1 TABLET BY MOUTH EVERY DAY    ATORVASTATIN (LIPITOR) 80 MG TABLET    Take 1 tablet (80 mg total) by mouth once daily.    B COMPLEX VITAMINS (B COMPLEX-VITAMIN B12) TABLET    Take 1 tablet by mouth once daily.    CALCIUM CARBONATE (CALCIUM 600 ORAL)    Take 1 tablet by mouth 2 (two) times a day.    CLOPIDOGREL (PLAVIX) 75 MG TABLET    Take 1 tablet (75 mg total) by mouth once daily.    ELIQUIS 5 MG TAB    TAKE 1 TABLET BY MOUTH TWICE A DAY    FAMOTIDINE (PEPCID) 20 MG TABLET    TAKE 1 TABLET (20 MG TOTAL) BY MOUTH ONCE DAILY. ON DIALYSIS DAYS, PLEASE TAKE THIS MEDICINE AFTER DIALYSIS.    FEXOFENADINE HCL (ALLEGRA ORAL)    Take 1 tablet by mouth daily as needed (allergies).    FLUTICASONE FUROATE-VILANTEROL (BREO) 100-25 MCG/DOSE DISKUS INHALER    Inhale 1 puff into the lungs once daily. Controller. Use this inhaler every day.    FLUTICASONE PROPIONATE (FLONASE) 50 MCG/ACTUATION NASAL SPRAY    1 spray (50 mcg total) by Each Nostril route daily as needed for Allergies.    GABAPENTIN (NEURONTIN) 300 MG CAPSULE    Take 1 capsule (300 mg total) by mouth every evening.    IPRATROPIUM (ATROVENT) 21 MCG (0.03 %) NASAL SPRAY    2 sprays by Each Nostril route 3 (three) times daily.    MULTIVIT-MIN-FA-LYCOPEN-LUTEIN (CENTRUM SILVER) 0.4 MG-300 MCG- 250 MCG TAB    Take 1 tablet by mouth once daily.    NITROGLYCERIN (NITROSTAT) 0.4 MG SL TABLET    Place 1 tablet (0.4 mg total) under the tongue every 5 (five) minutes as needed for Chest pain.    OMEPRAZOLE (PRILOSEC) 20 MG CAPSULE    Take 1 capsule (20 mg total) by mouth daily as needed (heartburn).    PULSE OXIMETER (PULSE OXIMETER) DEVICE    by Apply Externally route 2 (two) times a day. Use twice daily at 8 AM and 3 PM and [facility].    TORSEMIDE (DEMADEX) 20 MG TAB    Take 2 tablets (40 mg total) by mouth 3 (three) times a week.   Discontinued Medications    DOXAZOSIN (CARDURA) 4 MG TABLET    Take 1 tablet  "(4 mg total) by mouth every 12 (twelve) hours.    DOXERCALCIFEROL (HECTOROL IV)    3 mcg.    MULTIVITAMIN (THERAGRAN) PER TABLET    Take 1 tablet by mouth Daily.    TACROLIMUS (PROGRAF) 1 MG CAP    Take 3 capsules (3 mg total) by mouth every 12 (twelve) hours for 60 days, THEN 2 capsules (2 mg total) every 12 (twelve) hours for 60 days, THEN 1 capsule (1 mg total) every 12 (twelve) hours for 60 days, THEN 1 capsule (1 mg total) once daily.       Review of Systems  Constitution: Denies chills, fever, and sweats.  HENT: Denies headaches or blurry vision.  Cardiovascular: Denies chest pain or irregular heart beat.  Respiratory: Positive for shortness of breath on exertion.  Gastrointestinal: Denies abdominal pain, nausea, or vomiting.  Musculoskeletal: Denies muscle cramps.  Neurological: Denies dizziness or focal weakness.  Psychiatric/Behavioral: Normal mental status.  Hematologic/Lymphatic: Denies bleeding problem or easy bruising/bleeding.  Skin: Denies rash or suspicious lesions    Physical Examination  BP (!) 104/52   Pulse 72   Ht 6' 1" (1.854 m)   Wt 86.9 kg (191 lb 9.3 oz)   BMI 25.28 kg/m²     Constitutional: No acute distress, conversant  HEENT: Sclera anicteric, Pupils equal, round and reactive to light, extraocular motions intact, Oropharynx clear  Neck: No JVD, no carotid bruits  Cardiovascular: regular rate and rhythm, no murmur, rubs or gallops, normal S1/S2  Pulmonary: Clear to auscultation bilaterally  Abdominal: Abdomen soft, nontender, nondistended, positive bowel sounds  Extremities: No lower extremity edema,   Pulses:  Carotid pulses are 2+ on the right side, and 2+ on the left side.  Radial pulses are 2+ on the right side, and 2+ on the left side.   Femoral pulses are 2+ on the right side, and 2+ on the left side.  Popliteal pulses are 2+ on the right side, and 2+ on the left side.   Dorsalis pedis pulses are 2+ on the right side, and 2+ on the left side.   Posterior tibial pulses are 2+ on " the right side, and 2+ on the left side.    Skin: No ecchymosis, erythema, or ulcers  Psych: Alert and oriented x 3, appropriate affect  Neuro: CNII-XII intact, no focal deficits    Labs:  Most Recent Data  CBC:   Lab Results   Component Value Date    WBC 8.08 03/01/2025    HGB 13.2 (L) 03/01/2025    HCT 41.8 03/01/2025     03/01/2025    MCV 84 03/01/2025    RDW 16.1 (H) 03/01/2025     BMP:   Lab Results   Component Value Date     03/01/2025    K 5.5 (H) 03/01/2025    CL 99 03/01/2025    CO2 21 (L) 03/01/2025    BUN 47 (H) 03/01/2025    CREATININE 10.1 (H) 03/01/2025    GLU 99 03/01/2025    CALCIUM 8.5 (L) 03/01/2025    MG 2.3 03/01/2025    PHOS 7.0 (H) 03/01/2025     LFTS;   Lab Results   Component Value Date    PROT 7.0 03/01/2025    ALBUMIN 2.8 (L) 03/01/2025    BILITOT 0.4 03/01/2025    AST 16 03/01/2025    ALKPHOS 75 03/01/2025    ALT 20 03/01/2025     COAGS:   Lab Results   Component Value Date    INR 1.3 (H) 04/09/2024     FLP:   Lab Results   Component Value Date    CHOL 109 (L) 06/11/2024    CHOL 109 (L) 06/11/2024    HDL 54 06/11/2024    HDL 54 06/11/2024    LDLCALC 44.8 (L) 06/11/2024    LDLCALC 44.8 (L) 06/11/2024    TRIG 51 06/11/2024    TRIG 51 06/11/2024    CHOLHDL 49.5 06/11/2024    CHOLHDL 49.5 06/11/2024     CARDIAC:   Lab Results   Component Value Date    TROPONINI 3.311 (H) 04/11/2024     (H) 12/06/2024       Imaging:    EKG 7/20/2023:  Sinus bradycardia with Premature atrial complexes with Aberrant conduction   LVH with repolarization abnormality    Echo 12/7/2024:    Left Ventricle: The left ventricle is normal in size. Ventricular mass is normal. Normal wall thickness. Normal wall motion. There is normal systolic function with a visually estimated ejection fraction of 60 - 65%. Diastolic function cannot be reliably determined in the presence of mitral annular calcification. Normal left ventricular filling pressure.    Right Ventricle: Normal right ventricular cavity size.  Wall thickness is normal. Right ventricle wall motion  is normal. Systolic function is normal.    Left Atrium: Left atrium is severely dilated.    Right Atrium: Right atrium is moderately dilated.    Aortic Valve: There is moderate aortic valve sclerosis. There is moderate annular calcification present. Mildly restricted motion. There is mild stenosis. Aortic valve area by VTI is 1.6 cm2. Aortic valve peak velocity is 2.6 m/s. Mean gradient is 18.7 mmHg. The dimensionless index is 0.48. There is mild aortic regurgitation with a centrally directed jet. AV morphology not well seen. Non and left coronary leaflet is calcified and restricted.    Mitral Valve: There is severe posterior mitral annular calcification present.    Aorta: Aortic root is normal in size measuring 3.45 cm. Ascending aorta is normal measuring 2.78 cm.    Pulmonary Artery: There is mild pulmonary hypertension. The estimated pulmonary artery systolic pressure is 38 mmHg.    IVC/SVC: Normal venous pressure at 3 mmHg.    Pericardium: There is no pericardial effusion.    PET Stress Test 9/13/2023:    There is a small sized, mild intensity apical inferior and inferoseptal reversible perfusion abnormality involving 6% of LV myocardium indicative of focal coronary stenosis.    Within the perfusion abnormality, absolute myocardial perfusion (cc/min/gm) averaged 0.56 cc/min/g at rest, 0.71 cc/min/g at stress and CFR was 1.27 , which equates to severely reduced coronary flow capacity within the LAD territory.    There are no other significant perfusion abnormalities.    The whole heart absolute myocardial perfusion values averaged 0.74 cc/min/g at rest, which is normal; 1.17 cc/min/g at stress, which is mildly reduced; and CFR is 1.59 , which is mildly reduced.    CT attenuation images demonstrate severe diffuse coronary calcifications in the LAD, and moderate diffuse coronary calcifications in the LCX and RCA territory and mild diffuse aortic calcifications  in the descending aorta.    The gated perfusion images showed an ejection fraction of 53% at rest and 56% during stress. A normal ejection fraction is greater than 47%.    The wall motion is normal at rest and during stress.    The LV cavity size is mildly enlarged at rest and stress.    The ECG portion of the study is abnormal but not diagnostic due to resting ST-T abnormalities.    There were no arrhythmias during stress.    The patient reported chest pain during the stress test.    There are no prior studies for comparison.    Echo 2/10/2023:  The left ventricle is normal in size with normal systolic function. The estimated ejection fraction is 65%.  Normal right ventricular size with normal right ventricular systolic function.  Grade II left ventricular diastolic dysfunction.  Severe left atrial enlargement.  There is mild aortic valve stenosis. Aortic valve area is 1.8 cm2; peak velocity is 2.9 m/s; mean gradient is 15 mmHg.  Pulmonary HTN - the estimated PA systolic pressure is > 41mm Hg. (The IVC was not well visualized.)    Assessment/Plan:  Ibrahima Phelps is a 69 y.o. male with CAD s/p PCI 2006, PCI to RCA w/ 2 DIEGO 2/28/2024, paroxysmal Afib (on amiodarone and Eliquis), HFpEF, PAD, S/P kidney transplant on immunosuppressive therapy with Tacrolimus and prednisone, COPD, smoking, who presents for a follow up appointment.    CAD s/p PCI 2006, PCI to RCA w/ 2 DIEGO 2/28/2024- Stable with no angina currently. He is no longer taking metoprolol succinate 25 mg daily or nifedipine 30 mg daily due to low blood pressures. Also, holding off on starting imdur due to low blood pressures. Continue Eliquis 5 mg bid, plavix 75 mg daily, and atorvastatin 80 mg daily.     2. Paroxysmal Afib- Continue amiodarone and Eliquis.    3. HFpEF- Stable.  Echo as per above. Continue current medications.     4. Overweight- Encourage diet, exercise, and weight loss.     Follow up in 6 months with lipids prior    Total duration of face to  face visit time 15 minutes.  Total time spent counseling greater than fifty percent of total visit time.  Counseling included discussion regarding imaging findings, diagnosis, possibilities, treatment options, risks and benefits.  The patient had many questions regarding the options and long-term effects.    Hussain Vail MD, PhD  Interventional Cardiology

## 2025-03-26 ENCOUNTER — HOSPITAL ENCOUNTER (OUTPATIENT)
Dept: RADIOLOGY | Facility: HOSPITAL | Age: 70
Discharge: HOME | End: 2025-03-26
Attending: NURSE PRACTITIONER
Payer: MEDICARE

## 2025-03-26 DIAGNOSIS — D58.2 ELEVATED HEMOGLOBIN: ICD-10-CM

## 2025-03-26 PROCEDURE — 76770 US EXAM ABDO BACK WALL COMP: CPT | Mod: 26,HCNC,, | Performed by: STUDENT IN AN ORGANIZED HEALTH CARE EDUCATION/TRAINING PROGRAM

## 2025-03-26 PROCEDURE — 76770 US EXAM ABDO BACK WALL COMP: CPT | Mod: TC,HCNC

## 2025-03-28 ENCOUNTER — PATIENT MESSAGE (OUTPATIENT)
Dept: INTERNAL MEDICINE | Facility: CLINIC | Age: 70
End: 2025-03-28
Payer: MEDICARE

## 2025-03-28 DIAGNOSIS — H26.9 CATARACT, UNSPECIFIED CATARACT TYPE, UNSPECIFIED LATERALITY: Primary | ICD-10-CM

## 2025-03-29 NOTE — TELEPHONE ENCOUNTER
Pt requesting referral to Dr Ivy for cataracts, pended     LOV with Anatoliy Colindres MD , 2/28/2025

## 2025-03-31 ENCOUNTER — TELEPHONE (OUTPATIENT)
Dept: OPHTHALMOLOGY | Facility: CLINIC | Age: 70
End: 2025-03-31
Payer: MEDICARE

## 2025-04-06 DIAGNOSIS — R09.82 POST-NASAL DRIP: ICD-10-CM

## 2025-04-06 DIAGNOSIS — I25.10 CORONARY ARTERY DISEASE INVOLVING NATIVE CORONARY ARTERY OF NATIVE HEART WITHOUT ANGINA PECTORIS: Chronic | ICD-10-CM

## 2025-04-06 RX ORDER — IPRATROPIUM BROMIDE 21 UG/1
2 SPRAY, METERED NASAL 3 TIMES DAILY
Qty: 30 ML | Refills: 0 | Status: CANCELLED | OUTPATIENT
Start: 2025-04-06

## 2025-04-07 RX ORDER — IPRATROPIUM BROMIDE 21 UG/1
2 SPRAY, METERED NASAL 3 TIMES DAILY
Qty: 30 ML | Refills: 0 | Status: SHIPPED | OUTPATIENT
Start: 2025-04-07

## 2025-04-07 NOTE — TELEPHONE ENCOUNTER
Care Due:                  Date            Visit Type   Department     Provider  --------------------------------------------------------------------------------                                EP -                              PRIMARY      [facility] INTERNAL  Last Visit: 02-      CARE ([facility])   MEDICINE       Anatoliy WHITEHEAD Bernadine                              EP -                              PRIMARY      [facility] INTERNAL  Next Visit: 08-      CARE ([facility])   MEDICINE       Anatoliy A Bernadine                                                            Last  Test          Frequency    Reason                     Performed    Due Date  --------------------------------------------------------------------------------    Lipid Panel.  12 months..  atorvastatin.............  06- 06-    [facility] Embedded Care Due Messages. Reference number: [id number].   4/06/2025 11:00:01 PM CDT

## 2025-04-08 RX ORDER — CLOPIDOGREL BISULFATE 75 MG/1
75 TABLET ORAL DAILY
Qty: 90 TABLET | Refills: 3 | Status: SHIPPED | OUTPATIENT
Start: 2025-04-08 | End: 2026-04-08

## 2025-04-14 ENCOUNTER — OFFICE VISIT (OUTPATIENT)
Dept: OPHTHALMOLOGY | Facility: CLINIC | Age: 70
End: 2025-04-14
Payer: MEDICARE

## 2025-04-14 DIAGNOSIS — H25.13 NUCLEAR AGE-RELATED CATARACT, BOTH EYES: Primary | ICD-10-CM

## 2025-04-14 PROCEDURE — 99999 PR PBB SHADOW E&M-EST. PATIENT-LVL III: CPT | Mod: PBBFAC,,,

## 2025-04-14 PROCEDURE — 1126F AMNT PAIN NOTED NONE PRSNT: CPT | Mod: CPTII,S$GLB,,

## 2025-04-14 PROCEDURE — 1159F MED LIST DOCD IN RCRD: CPT | Mod: CPTII,S$GLB,,

## 2025-04-14 PROCEDURE — 3288F FALL RISK ASSESSMENT DOCD: CPT | Mod: CPTII,S$GLB,,

## 2025-04-14 PROCEDURE — 92004 COMPRE OPH EXAM NEW PT 1/>: CPT | Mod: S$GLB,,,

## 2025-04-14 PROCEDURE — 1160F RVW MEDS BY RX/DR IN RCRD: CPT | Mod: CPTII,S$GLB,,

## 2025-04-14 PROCEDURE — 1101F PT FALLS ASSESS-DOCD LE1/YR: CPT | Mod: CPTII,S$GLB,,

## 2025-04-14 NOTE — PROGRESS NOTES
HPI    70 y/o male is here for cataract evaluation. He has been having itching in   both eyes.  He has been using Pataday OU.  He has not been driving at   night. Difficulty reading small print.     No eye sx  Pataday QD OU  Reading glasses only  Last edited by Trish Dove, HIRAM on 4/14/2025  2:51 PM.            Assessment /Plan     For exam results, see Encounter Report.    Nuclear age-related cataract, both eyes        Visually Significant Cataract: Patient reports decreased vision consistent with the clinical amount of lenticular opacity, which reaches the level of visual significance and affects activities of daily living.     Risks, benefits, and alternatives to cataract surgery were discussed and the consent reviewed. IOL options were discussed, including ATIOLs and the associated side effects and additional patient cost associated with them.    Patient educational materials including eye models, videos, and brochures were utilized to explain that Traditional Cataract Surgery does not include techniques and technologies to reduce dependence on glasses or contacts, whereas Advanced Technology Cataract Surgery (ATCS) does include advanced pre operative work up and intraoperative/post operative techniques and technologies to reduced dependence on glasses/contacts. I explained to the patient that ATCS is an elective choice and has additional out of pocket costs, not covered by insurance, and that results cannot be guaranteed; thus some patients will still need glasses or contacts for some tasks.  Side effects of full range IOLs were specifically discussed including haloes and starbursts around lights at night.    Pt still considering options: traditional vs ATCS    Mild NSC cataracts (1+) OU with mild VA changes and complaints. Pt educated on alternatives to cataract surgery at this time. He would like to hold off on moving forward with surgery. F/u 6mo-1year. OTC ATs prn for dryness and itching. Cont Pataday  OU.

## 2025-04-25 DIAGNOSIS — I95.89 OTHER SPECIFIED HYPOTENSION: Primary | ICD-10-CM

## 2025-04-25 DIAGNOSIS — R18.8 OTHER ASCITES: ICD-10-CM

## 2025-04-25 RX ORDER — MIDODRINE HYDROCHLORIDE 2.5 MG/1
2.5 TABLET ORAL 3 TIMES DAILY PRN
Qty: 270 TABLET | Refills: 0 | Status: SHIPPED | OUTPATIENT
Start: 2025-04-25 | End: 2025-10-22

## 2025-04-25 RX ORDER — MIDODRINE HYDROCHLORIDE 2.5 MG/1
2.5 TABLET ORAL 3 TIMES DAILY PRN
Qty: 270 TABLET | Refills: 0 | Status: SHIPPED | OUTPATIENT
Start: 2025-04-25 | End: 2025-04-25 | Stop reason: SDUPTHER

## 2025-04-25 NOTE — PROGRESS NOTES
Spoke with the patient, [facility] does not carry mid[facility]rine, so I sent it to [facility]. The patient left from the HD unit at the weight of 87.6 kg. BP at home just now is 119/43 mmHg (up from high 80s earlier). Advised to continue to monitor. If the blood pressure drops again, then will need to go to the ER for further infection/cardiac workup. Pt's wife verbalized understanding and agrees with the plan.

## 2025-04-25 NOTE — PROGRESS NOTES
Hypotensive episode noted on dialysis and intermittently at home. The patient reported no orthostatic symptoms and appears euvolemic. Gave 5 mg of midodrine at the dialysis unit and will give him 2.5 mg TID PRN as needed. Increased his dry weight to 86.5 kg.

## 2025-05-01 ENCOUNTER — PATIENT MESSAGE (OUTPATIENT)
Dept: CARDIOLOGY | Facility: CLINIC | Age: 70
End: 2025-05-01
Payer: MEDICARE

## 2025-05-05 ENCOUNTER — PATIENT MESSAGE (OUTPATIENT)
Dept: ELECTROPHYSIOLOGY | Facility: CLINIC | Age: 70
End: 2025-05-05
Payer: MEDICARE

## 2025-05-05 DIAGNOSIS — I95.1 ORTHOSTATIC HYPOTENSION: Primary | ICD-10-CM

## 2025-05-06 ENCOUNTER — TELEPHONE (OUTPATIENT)
Dept: CARDIOLOGY | Facility: CLINIC | Age: 70
End: 2025-05-06
Payer: MEDICARE

## 2025-05-06 NOTE — TELEPHONE ENCOUNTER
----- Message from Hussain Vail MD PhD sent at 5/5/2025  5:31 PM CDT -----  Please call Mr. Phelps and make sure he gets set up with compression hose.  Order placed in epic.  Thank you

## 2025-05-08 ENCOUNTER — PATIENT MESSAGE (OUTPATIENT)
Dept: CARDIOLOGY | Facility: CLINIC | Age: 70
End: 2025-05-08
Payer: MEDICARE

## 2025-05-09 DIAGNOSIS — I25.10 CORONARY ARTERY DISEASE INVOLVING NATIVE CORONARY ARTERY OF NATIVE HEART WITHOUT ANGINA PECTORIS: Primary | ICD-10-CM

## 2025-05-12 ENCOUNTER — PATIENT MESSAGE (OUTPATIENT)
Dept: CARDIOLOGY | Facility: CLINIC | Age: 70
End: 2025-05-12
Payer: MEDICARE

## 2025-05-12 ENCOUNTER — HOSPITAL ENCOUNTER (OUTPATIENT)
Dept: CARDIOLOGY | Facility: CLINIC | Age: 70
Discharge: HOME | End: 2025-05-12
Payer: MEDICARE

## 2025-05-12 DIAGNOSIS — I25.10 CORONARY ARTERY DISEASE INVOLVING NATIVE CORONARY ARTERY OF NATIVE HEART WITHOUT ANGINA PECTORIS: ICD-10-CM

## 2025-05-12 LAB
OHS QRS DURATION: 112 MS
OHS QTC CALCULATION: 428 MS

## 2025-05-12 PROCEDURE — 93010 ELECTROCARDIOGRAM REPORT: CPT | Mod: HCNC,S$GLB,, | Performed by: INTERNAL MEDICINE

## 2025-05-12 PROCEDURE — 93005 ELECTROCARDIOGRAM TRACING: CPT | Mod: HCNC,S$GLB,, | Performed by: INTERNAL MEDICINE

## 2025-05-20 ENCOUNTER — HOSPITAL ENCOUNTER (INPATIENT)
Facility: HOSPITAL | Age: 70
LOS: 1 days | Discharge: HOME | DRG: 193 | End: 2025-05-21
Attending: STUDENT IN AN ORGANIZED HEALTH CARE EDUCATION/TRAINING PROGRAM | Admitting: STUDENT IN AN ORGANIZED HEALTH CARE EDUCATION/TRAINING PROGRAM
Payer: MEDICARE

## 2025-05-20 DIAGNOSIS — J15.9 COMMUNITY ACQUIRED BACTERIAL PNEUMONIA: ICD-10-CM

## 2025-05-20 DIAGNOSIS — R07.9 CHEST PAIN: ICD-10-CM

## 2025-05-20 DIAGNOSIS — R53.1 WEAKNESS GENERALIZED: ICD-10-CM

## 2025-05-20 DIAGNOSIS — J18.9 PNEUMONIA OF LEFT LUNG DUE TO INFECTIOUS ORGANISM, UNSPECIFIED PART OF LUNG: Primary | ICD-10-CM

## 2025-05-20 DIAGNOSIS — Z13.6 SCREENING FOR CARDIOVASCULAR CONDITION: ICD-10-CM

## 2025-05-20 PROBLEM — I50.30 UNSPECIFIED DIASTOLIC (CONGESTIVE) HEART FAILURE: Status: ACTIVE | Noted: 2024-02-28

## 2025-05-20 PROBLEM — J96.01 ACUTE HYPOXIC RESPIRATORY FAILURE: Status: ACTIVE | Noted: 2025-05-20

## 2025-05-20 PROBLEM — E83.39 OTHER DISORDERS OF PHOSPHORUS METABOLISM: Status: ACTIVE | Noted: 2024-02-24

## 2025-05-20 PROBLEM — I95.3 HEMODIALYSIS-ASSOCIATED HYPOTENSION: Status: ACTIVE | Noted: 2025-05-06

## 2025-05-20 PROBLEM — J43.8 OTHER EMPHYSEMA: Status: ACTIVE | Noted: 2024-02-28

## 2025-05-20 PROBLEM — N18.9 ANEMIA IN CHRONIC KIDNEY DISEASE: Status: ACTIVE | Noted: 2024-02-26

## 2025-05-20 PROBLEM — I25.2 OLD MYOCARDIAL INFARCTION: Status: ACTIVE | Noted: 2024-02-28

## 2025-05-20 PROBLEM — I25.10 ATHEROSCLEROTIC HEART DISEASE OF NATIVE CORONARY ARTERY WITHOUT ANGINA PECTORIS: Status: ACTIVE | Noted: 2022-10-23

## 2025-05-20 PROBLEM — J44.9 CHRONIC OBSTRUCTIVE PULMONARY DISEASE, UNSPECIFIED: Status: ACTIVE | Noted: 2024-02-28

## 2025-05-20 LAB
ABSOLUTE EOSINOPHIL (OHS): 0.3 K/UL
ABSOLUTE MONOCYTE (OHS): 0.62 K/UL (ref 0.3–1)
ABSOLUTE NEUTROPHIL COUNT (OHS): 8.04 K/UL (ref 1.8–7.7)
ALBUMIN SERPL BCP-MCNC: 2.9 G/DL (ref 3.5–5.2)
ALP SERPL-CCNC: 119 UNIT/L (ref 40–150)
ALT SERPL W/O P-5'-P-CCNC: 13 UNIT/L (ref 10–44)
ANION GAP (OHS): 16 MMOL/L (ref 8–16)
AST SERPL-CCNC: 7 UNIT/L (ref 11–45)
BASOPHILS # BLD AUTO: 0.02 K/UL
BASOPHILS NFR BLD AUTO: 0.2 %
BILIRUB SERPL-MCNC: 0.5 MG/DL (ref 0.1–1)
BIPAP: 0
BNP SERPL-MCNC: 953 PG/ML (ref 0–99)
BUN SERPL-MCNC: 43 MG/DL (ref 6–30)
BUN SERPL-MCNC: 47 MG/DL (ref 8–23)
CALCIUM SERPL-MCNC: 8.9 MG/DL (ref 8.7–10.5)
CHLORIDE SERPL-SCNC: 102 MMOL/L (ref 95–110)
CHLORIDE SERPL-SCNC: 103 MMOL/L (ref 95–110)
CO2 SERPL-SCNC: 22 MMOL/L (ref 23–29)
CORRECTED TEMPERATURE (PCO2): 43.4 MMHG
CORRECTED TEMPERATURE (PH): 7.37
CORRECTED TEMPERATURE (PO2): 26.9 MMHG
CREAT SERPL-MCNC: 10.7 MG/DL (ref 0.5–1.4)
CREAT SERPL-MCNC: 11.2 MG/DL (ref 0.5–1.4)
ERYTHROCYTE [DISTWIDTH] IN BLOOD BY AUTOMATED COUNT: 16.4 % (ref 11.5–14.5)
FIO2: 21 %
GFR SERPLBLD CREATININE-BSD FMLA CKD-EPI: 5 ML/MIN/1.73/M2
GLUCOSE SERPL-MCNC: 74 MG/DL (ref 70–110)
GLUCOSE SERPL-MCNC: 76 MG/DL (ref 70–110)
HCT VFR BLD AUTO: 37.5 % (ref 40–54)
HCT VFR BLD CALC: 38.3 % (ref 36–54)
HCT VFR BLD CALC: 39 %PCV (ref 36–54)
HGB BLD-MCNC: 11.6 GM/DL (ref 14–18)
IMM GRANULOCYTES # BLD AUTO: 0.03 K/UL (ref 0–0.04)
IMM GRANULOCYTES NFR BLD AUTO: 0.3 % (ref 0–0.5)
INFLUENZA A MOLECULAR (OHS): NEGATIVE
INFLUENZA B MOLECULAR (OHS): NEGATIVE
LDH SERPL L TO P-CCNC: 1.6 MMOL/L (ref 0.5–2.2)
LYMPHOCYTES # BLD AUTO: 0.68 K/UL (ref 1–4.8)
MCH RBC QN AUTO: 26.1 PG (ref 27–31)
MCHC RBC AUTO-ENTMCNC: 30.9 G/DL (ref 32–36)
MCV RBC AUTO: 84 FL (ref 82–98)
NUCLEATED RBC (/100WBC) (OHS): 0 /100 WBC
OHS QRS DURATION: 100 MS
OHS QRS DURATION: 104 MS
OHS QRS DURATION: 98 MS
OHS QTC CALCULATION: 427 MS
OHS QTC CALCULATION: 429 MS
OHS QTC CALCULATION: 432 MS
PCO2 BLDA: 43.4 MMHG (ref 35–45)
PH SMN: 7.37 [PH] (ref 7.35–7.45)
PLATELET # BLD AUTO: 169 K/UL (ref 150–450)
PMV BLD AUTO: 11.1 FL (ref 9.2–12.9)
PO2 BLDA: 26.9 MMHG (ref 40–60)
POC BASE DEFICIT: -0.6 MMOL/L
POC HCO3: 22.9 MMOL/L (ref 24–28)
POC IONIZED CALCIUM: 1.16 MMOL/L (ref 1.06–1.42)
POC PERFORMED BY: ABNORMAL
POC TCO2 (MEASURED): 22 MMOL/L (ref 23–29)
POC TEMPERATURE: 37 C
POTASSIUM BLD-SCNC: 4.6 MMOL/L (ref 3.5–5.1)
POTASSIUM SERPL-SCNC: 4.6 MMOL/L (ref 3.5–5.1)
PROT SERPL-MCNC: 7.1 GM/DL (ref 6–8.4)
RBC # BLD AUTO: 4.45 M/UL (ref 4.6–6.2)
RELATIVE EOSINOPHIL (OHS): 3.1 %
RELATIVE LYMPHOCYTE (OHS): 7 % (ref 18–48)
RELATIVE MONOCYTE (OHS): 6.4 % (ref 4–15)
RELATIVE NEUTROPHIL (OHS): 83 % (ref 38–73)
SAMPLE: ABNORMAL
SARS-COV-2 RDRP RESP QL NAA+PROBE: NEGATIVE
SODIUM BLD-SCNC: 140 MMOL/L (ref 136–145)
SODIUM SERPL-SCNC: 140 MMOL/L (ref 136–145)
SPECIMEN SOURCE: ABNORMAL
TROPONIN I SERPL HS-MCNC: 149 NG/L
TROPONIN I SERPL HS-MCNC: 263 NG/L
TROPONIN I SERPL HS-MCNC: 80 NG/L
WBC # BLD AUTO: 9.69 K/UL (ref 3.9–12.7)

## 2025-05-20 PROCEDURE — 85025 COMPLETE CBC W/AUTO DIFF WBC: CPT | Mod: HCNC

## 2025-05-20 PROCEDURE — 99222 1ST HOSP IP/OBS MODERATE 55: CPT | Mod: HCNC,,, | Performed by: NURSE PRACTITIONER

## 2025-05-20 PROCEDURE — 25000003 PHARM REV CODE 250: Mod: HCNC | Performed by: STUDENT IN AN ORGANIZED HEALTH CARE EDUCATION/TRAINING PROGRAM

## 2025-05-20 PROCEDURE — 93005 ELECTROCARDIOGRAM TRACING: CPT | Mod: HCNC

## 2025-05-20 PROCEDURE — 96375 TX/PRO/DX INJ NEW DRUG ADDON: CPT | Mod: HCNC

## 2025-05-20 PROCEDURE — 82803 BLOOD GASES ANY COMBINATION: CPT | Mod: HCNC

## 2025-05-20 PROCEDURE — 87040 BLOOD CULTURE FOR BACTERIA: CPT | Mod: HCNC

## 2025-05-20 PROCEDURE — 83880 ASSAY OF NATRIURETIC PEPTIDE: CPT | Mod: HCNC

## 2025-05-20 PROCEDURE — 96365 THER/PROPH/DIAG IV INF INIT: CPT | Mod: HCNC

## 2025-05-20 PROCEDURE — 20600001 HC STEP DOWN PRIVATE ROOM: Mod: HCNC

## 2025-05-20 PROCEDURE — 80047 BASIC METABLC PNL IONIZED CA: CPT | Mod: HCNC

## 2025-05-20 PROCEDURE — 82435 ASSAY OF BLOOD CHLORIDE: CPT | Mod: HCNC

## 2025-05-20 PROCEDURE — 63600175 PHARM REV CODE 636 W HCPCS: Mod: HCNC | Performed by: STUDENT IN AN ORGANIZED HEALTH CARE EDUCATION/TRAINING PROGRAM

## 2025-05-20 PROCEDURE — 93010 ELECTROCARDIOGRAM REPORT: CPT | Mod: HCNC,,, | Performed by: INTERNAL MEDICINE

## 2025-05-20 PROCEDURE — 80100016 HC MAINTENANCE HEMODIALYSIS: Mod: HCNC

## 2025-05-20 PROCEDURE — 84484 ASSAY OF TROPONIN QUANT: CPT | Mod: HCNC

## 2025-05-20 PROCEDURE — 25000242 PHARM REV CODE 250 ALT 637 W/ HCPCS: Mod: HCNC | Performed by: STUDENT IN AN ORGANIZED HEALTH CARE EDUCATION/TRAINING PROGRAM

## 2025-05-20 PROCEDURE — 25000003 PHARM REV CODE 250: Mod: HCNC

## 2025-05-20 PROCEDURE — 83605 ASSAY OF LACTIC ACID: CPT | Mod: HCNC

## 2025-05-20 PROCEDURE — 63600175 PHARM REV CODE 636 W HCPCS: Mod: HCNC

## 2025-05-20 PROCEDURE — 99900035 HC TECH TIME PER 15 MIN (STAT): Mod: HCNC

## 2025-05-20 PROCEDURE — 84484 ASSAY OF TROPONIN QUANT: CPT | Mod: HCNC | Performed by: STUDENT IN AN ORGANIZED HEALTH CARE EDUCATION/TRAINING PROGRAM

## 2025-05-20 PROCEDURE — G0257 UNSCHED DIALYSIS ESRD PT HOS: HCPCS | Mod: HCNC

## 2025-05-20 PROCEDURE — U0002 COVID-19 LAB TEST NON-CDC: HCPCS | Mod: HCNC

## 2025-05-20 PROCEDURE — 99285 EMERGENCY DEPT VISIT HI MDM: CPT | Mod: 25,HCNC

## 2025-05-20 PROCEDURE — 93010 ELECTROCARDIOGRAM REPORT: CPT | Mod: HCNC,76,, | Performed by: INTERNAL MEDICINE

## 2025-05-20 PROCEDURE — 87502 INFLUENZA DNA AMP PROBE: CPT | Mod: HCNC

## 2025-05-20 RX ORDER — CLOPIDOGREL BISULFATE 75 MG/1
75 TABLET ORAL DAILY
Status: DISCONTINUED | OUTPATIENT
Start: 2025-05-20 | End: 2025-05-21 | Stop reason: HOSPADM

## 2025-05-20 RX ORDER — AZITHROMYCIN 250 MG/1
500 TABLET, FILM COATED ORAL DAILY
Status: DISCONTINUED | OUTPATIENT
Start: 2025-05-21 | End: 2025-05-21 | Stop reason: HOSPADM

## 2025-05-20 RX ORDER — FLUTICASONE FUROATE AND VILANTEROL 100; 25 UG/1; UG/1
1 POWDER RESPIRATORY (INHALATION) DAILY
Status: DISCONTINUED | OUTPATIENT
Start: 2025-05-20 | End: 2025-05-21 | Stop reason: HOSPADM

## 2025-05-20 RX ORDER — PANTOPRAZOLE SODIUM 40 MG/1
40 TABLET, DELAYED RELEASE ORAL DAILY
Status: DISCONTINUED | OUTPATIENT
Start: 2025-05-20 | End: 2025-05-20

## 2025-05-20 RX ORDER — SODIUM CHLORIDE 9 MG/ML
INJECTION, SOLUTION INTRAVENOUS ONCE
Status: CANCELLED | OUTPATIENT
Start: 2025-05-20 | End: 2025-05-20

## 2025-05-20 RX ORDER — GABAPENTIN 300 MG/1
300 CAPSULE ORAL NIGHTLY
Status: DISCONTINUED | OUTPATIENT
Start: 2025-05-20 | End: 2025-05-21 | Stop reason: HOSPADM

## 2025-05-20 RX ORDER — IPRATROPIUM BROMIDE AND ALBUTEROL SULFATE 2.5; .5 MG/3ML; MG/3ML
3 SOLUTION RESPIRATORY (INHALATION) EVERY 6 HOURS PRN
Status: DISCONTINUED | OUTPATIENT
Start: 2025-05-20 | End: 2025-05-21 | Stop reason: HOSPADM

## 2025-05-20 RX ORDER — POLYETHYLENE GLYCOL 3350 17 G/17G
17 POWDER, FOR SOLUTION ORAL DAILY
Status: DISCONTINUED | OUTPATIENT
Start: 2025-05-20 | End: 2025-05-21 | Stop reason: HOSPADM

## 2025-05-20 RX ORDER — PROCHLORPERAZINE EDISYLATE 5 MG/ML
5 INJECTION INTRAMUSCULAR; INTRAVENOUS EVERY 6 HOURS PRN
Status: DISCONTINUED | OUTPATIENT
Start: 2025-05-20 | End: 2025-05-21 | Stop reason: HOSPADM

## 2025-05-20 RX ORDER — IPRATROPIUM BROMIDE 42 UG/1
1 SPRAY, METERED NASAL 3 TIMES DAILY
Status: DISCONTINUED | OUTPATIENT
Start: 2025-05-20 | End: 2025-05-20

## 2025-05-20 RX ORDER — IBUPROFEN 200 MG
16 TABLET ORAL
Status: DISCONTINUED | OUTPATIENT
Start: 2025-05-20 | End: 2025-05-21 | Stop reason: HOSPADM

## 2025-05-20 RX ORDER — CEFTRIAXONE 1 G/1
1 INJECTION, POWDER, FOR SOLUTION INTRAMUSCULAR; INTRAVENOUS
Status: COMPLETED | OUTPATIENT
Start: 2025-05-20 | End: 2025-05-20

## 2025-05-20 RX ORDER — MORPHINE SULFATE 2 MG/ML
INJECTION, SOLUTION INTRAMUSCULAR; INTRAVENOUS
Status: DISPENSED
Start: 2025-05-20 | End: 2025-05-21

## 2025-05-20 RX ORDER — ACETAMINOPHEN 500 MG
1000 TABLET ORAL
Status: COMPLETED | OUTPATIENT
Start: 2025-05-20 | End: 2025-05-20

## 2025-05-20 RX ORDER — TORSEMIDE 20 MG/1
40 TABLET ORAL
Status: DISCONTINUED | OUTPATIENT
Start: 2025-05-21 | End: 2025-05-21 | Stop reason: HOSPADM

## 2025-05-20 RX ORDER — MORPHINE SULFATE 2 MG/ML
1 INJECTION, SOLUTION INTRAMUSCULAR; INTRAVENOUS ONCE
Status: COMPLETED | OUTPATIENT
Start: 2025-05-20 | End: 2025-05-20

## 2025-05-20 RX ORDER — SEVELAMER CARBONATE 800 MG/1
800 TABLET, FILM COATED ORAL
Status: DISCONTINUED | OUTPATIENT
Start: 2025-05-21 | End: 2025-05-21 | Stop reason: HOSPADM

## 2025-05-20 RX ORDER — TALC
6 POWDER (GRAM) TOPICAL NIGHTLY PRN
Status: DISCONTINUED | OUTPATIENT
Start: 2025-05-20 | End: 2025-05-21 | Stop reason: HOSPADM

## 2025-05-20 RX ORDER — FLUTICASONE PROPIONATE 50 MCG
1 SPRAY, SUSPENSION (ML) NASAL DAILY PRN
Status: DISCONTINUED | OUTPATIENT
Start: 2025-05-20 | End: 2025-05-21 | Stop reason: HOSPADM

## 2025-05-20 RX ORDER — FAMOTIDINE 20 MG/1
20 TABLET, FILM COATED ORAL
Status: DISCONTINUED | OUTPATIENT
Start: 2025-05-20 | End: 2025-05-21 | Stop reason: HOSPADM

## 2025-05-20 RX ORDER — GLUCAGON 1 MG
1 KIT INJECTION
Status: DISCONTINUED | OUTPATIENT
Start: 2025-05-20 | End: 2025-05-21 | Stop reason: HOSPADM

## 2025-05-20 RX ORDER — AMIODARONE HYDROCHLORIDE 200 MG/1
200 TABLET ORAL DAILY
Status: DISCONTINUED | OUTPATIENT
Start: 2025-05-20 | End: 2025-05-21 | Stop reason: HOSPADM

## 2025-05-20 RX ORDER — NALOXONE HCL 0.4 MG/ML
0.02 VIAL (ML) INJECTION
Status: DISCONTINUED | OUTPATIENT
Start: 2025-05-20 | End: 2025-05-21 | Stop reason: HOSPADM

## 2025-05-20 RX ORDER — ONDANSETRON 8 MG/1
8 TABLET, ORALLY DISINTEGRATING ORAL EVERY 8 HOURS PRN
Status: DISCONTINUED | OUTPATIENT
Start: 2025-05-20 | End: 2025-05-21 | Stop reason: HOSPADM

## 2025-05-20 RX ORDER — HYDROMORPHONE HYDROCHLORIDE 1 MG/ML
0.5 INJECTION, SOLUTION INTRAMUSCULAR; INTRAVENOUS; SUBCUTANEOUS
Refills: 0 | Status: COMPLETED | OUTPATIENT
Start: 2025-05-20 | End: 2025-05-20

## 2025-05-20 RX ORDER — DIPHENHYDRAMINE HCL 25 MG
25 CAPSULE ORAL EVERY 6 HOURS PRN
Status: DISCONTINUED | OUTPATIENT
Start: 2025-05-20 | End: 2025-05-21 | Stop reason: HOSPADM

## 2025-05-20 RX ORDER — ACETAMINOPHEN 325 MG/1
650 TABLET ORAL EVERY 6 HOURS PRN
Status: DISCONTINUED | OUTPATIENT
Start: 2025-05-20 | End: 2025-05-21 | Stop reason: HOSPADM

## 2025-05-20 RX ORDER — ATORVASTATIN CALCIUM 40 MG/1
80 TABLET, FILM COATED ORAL DAILY
Status: DISCONTINUED | OUTPATIENT
Start: 2025-05-20 | End: 2025-05-21 | Stop reason: HOSPADM

## 2025-05-20 RX ORDER — IBUPROFEN 200 MG
24 TABLET ORAL
Status: DISCONTINUED | OUTPATIENT
Start: 2025-05-20 | End: 2025-05-21 | Stop reason: HOSPADM

## 2025-05-20 RX ORDER — SODIUM CHLORIDE 0.9 % (FLUSH) 0.9 %
10 SYRINGE (ML) INJECTION EVERY 8 HOURS PRN
Status: DISCONTINUED | OUTPATIENT
Start: 2025-05-20 | End: 2025-05-21 | Stop reason: HOSPADM

## 2025-05-20 RX ORDER — NITROGLYCERIN 0.4 MG/1
0.4 TABLET SUBLINGUAL EVERY 5 MIN PRN
Status: DISCONTINUED | OUTPATIENT
Start: 2025-05-20 | End: 2025-05-21 | Stop reason: HOSPADM

## 2025-05-20 RX ORDER — CEFTRIAXONE 1 G/1
1 INJECTION, POWDER, FOR SOLUTION INTRAMUSCULAR; INTRAVENOUS
Status: DISCONTINUED | OUTPATIENT
Start: 2025-05-21 | End: 2025-05-21 | Stop reason: HOSPADM

## 2025-05-20 RX ORDER — MIDODRINE HYDROCHLORIDE 2.5 MG/1
2.5 TABLET ORAL 3 TIMES DAILY PRN
Status: DISCONTINUED | OUTPATIENT
Start: 2025-05-20 | End: 2025-05-21 | Stop reason: HOSPADM

## 2025-05-20 RX ORDER — OXYCODONE HYDROCHLORIDE 5 MG/1
5 TABLET ORAL EVERY 6 HOURS PRN
Refills: 0 | Status: DISCONTINUED | OUTPATIENT
Start: 2025-05-20 | End: 2025-05-21 | Stop reason: HOSPADM

## 2025-05-20 RX ADMIN — OXYCODONE 5 MG: 5 TABLET ORAL at 01:05

## 2025-05-20 RX ADMIN — DIPHENHYDRAMINE HYDROCHLORIDE 25 MG: 25 CAPSULE ORAL at 08:05

## 2025-05-20 RX ADMIN — OXYCODONE 5 MG: 5 TABLET ORAL at 07:05

## 2025-05-20 RX ADMIN — HYDROMORPHONE HYDROCHLORIDE 0.5 MG: 1 INJECTION, SOLUTION INTRAMUSCULAR; INTRAVENOUS; SUBCUTANEOUS at 09:05

## 2025-05-20 RX ADMIN — APIXABAN 5 MG: 5 TABLET, FILM COATED ORAL at 08:05

## 2025-05-20 RX ADMIN — ACETAMINOPHEN 1000 MG: 500 TABLET ORAL at 12:05

## 2025-05-20 RX ADMIN — MORPHINE SULFATE 1 MG: 2 INJECTION, SOLUTION INTRAMUSCULAR; INTRAVENOUS at 08:05

## 2025-05-20 RX ADMIN — THERA TABS 1 TABLET: TAB at 12:05

## 2025-05-20 RX ADMIN — CLOPIDOGREL 75 MG: 75 TABLET ORAL at 12:05

## 2025-05-20 RX ADMIN — POLYETHYLENE GLYCOL 3350 17 G: 17 POWDER, FOR SOLUTION ORAL at 12:05

## 2025-05-20 RX ADMIN — CEFTRIAXONE 1 G: 1 INJECTION, POWDER, FOR SOLUTION INTRAMUSCULAR; INTRAVENOUS at 09:05

## 2025-05-20 RX ADMIN — FLUTICASONE FUROATE AND VILANTEROL TRIFENATATE 1 PUFF: 100; 25 POWDER RESPIRATORY (INHALATION) at 01:05

## 2025-05-20 RX ADMIN — FAMOTIDINE 20 MG: 20 TABLET, FILM COATED ORAL at 12:05

## 2025-05-20 RX ADMIN — GABAPENTIN 300 MG: 300 CAPSULE ORAL at 08:05

## 2025-05-20 RX ADMIN — AMIODARONE HYDROCHLORIDE 200 MG: 200 TABLET ORAL at 12:05

## 2025-05-20 RX ADMIN — ATORVASTATIN CALCIUM 80 MG: 40 TABLET, FILM COATED ORAL at 12:05

## 2025-05-20 RX ADMIN — AZITHROMYCIN MONOHYDRATE 500 MG: 500 INJECTION, POWDER, LYOPHILIZED, FOR SOLUTION INTRAVENOUS at 10:05

## 2025-05-21 VITALS
DIASTOLIC BLOOD PRESSURE: 53 MMHG | SYSTOLIC BLOOD PRESSURE: 120 MMHG | OXYGEN SATURATION: 97 % | TEMPERATURE: 98 F | BODY MASS INDEX: 27.17 KG/M2 | HEART RATE: 74 BPM | RESPIRATION RATE: 18 BRPM | WEIGHT: 205 LBS | HEIGHT: 73 IN

## 2025-05-21 LAB
ABSOLUTE EOSINOPHIL (OHS): 0.59 K/UL
ABSOLUTE MONOCYTE (OHS): 0.89 K/UL (ref 0.3–1)
ABSOLUTE NEUTROPHIL COUNT (OHS): 6.43 K/UL (ref 1.8–7.7)
ANION GAP (OHS): 9 MMOL/L (ref 8–16)
BACTERIA #/AREA URNS AUTO: ABNORMAL /HPF
BASOPHILS # BLD AUTO: 0.02 K/UL
BASOPHILS NFR BLD AUTO: 0.2 %
BILIRUB UR QL STRIP.AUTO: NEGATIVE
BUN SERPL-MCNC: 40 MG/DL (ref 8–23)
CALCIUM SERPL-MCNC: 8.8 MG/DL (ref 8.7–10.5)
CHLORIDE SERPL-SCNC: 103 MMOL/L (ref 95–110)
CLARITY UR: CLEAR
CO2 SERPL-SCNC: 23 MMOL/L (ref 23–29)
COLOR UR AUTO: YELLOW
CREAT SERPL-MCNC: 9.2 MG/DL (ref 0.5–1.4)
ERYTHROCYTE [DISTWIDTH] IN BLOOD BY AUTOMATED COUNT: 16.5 % (ref 11.5–14.5)
GFR SERPLBLD CREATININE-BSD FMLA CKD-EPI: 6 ML/MIN/1.73/M2
GLUCOSE SERPL-MCNC: 74 MG/DL (ref 70–110)
GLUCOSE UR QL STRIP: NEGATIVE
HCT VFR BLD AUTO: 33 % (ref 40–54)
HGB BLD-MCNC: 10.6 GM/DL (ref 14–18)
HGB UR QL STRIP: ABNORMAL
HYALINE CASTS UR QL AUTO: 0 /LPF (ref 0–1)
IMM GRANULOCYTES # BLD AUTO: 0.04 K/UL (ref 0–0.04)
IMM GRANULOCYTES NFR BLD AUTO: 0.4 % (ref 0–0.5)
KETONES UR QL STRIP: NEGATIVE
LEUKOCYTE ESTERASE UR QL STRIP: ABNORMAL
LYMPHOCYTES # BLD AUTO: 1.55 K/UL (ref 1–4.8)
MAGNESIUM SERPL-MCNC: 2.1 MG/DL (ref 1.6–2.6)
MCH RBC QN AUTO: 26.4 PG (ref 27–31)
MCHC RBC AUTO-ENTMCNC: 32.1 G/DL (ref 32–36)
MCV RBC AUTO: 82 FL (ref 82–98)
MICROSCOPIC COMMENT: ABNORMAL
NITRITE UR QL STRIP: NEGATIVE
NUCLEATED RBC (/100WBC) (OHS): 0 /100 WBC
PH UR STRIP: 8 [PH]
PHOSPHATE SERPL-MCNC: 4.6 MG/DL (ref 2.7–4.5)
PLATELET # BLD AUTO: 150 K/UL (ref 150–450)
PMV BLD AUTO: 11.5 FL (ref 9.2–12.9)
POTASSIUM SERPL-SCNC: 4.7 MMOL/L (ref 3.5–5.1)
PROT UR QL STRIP: ABNORMAL
RBC # BLD AUTO: 4.02 M/UL (ref 4.6–6.2)
RBC #/AREA URNS AUTO: 32 /HPF (ref 0–4)
RELATIVE EOSINOPHIL (OHS): 6.2 %
RELATIVE LYMPHOCYTE (OHS): 16.3 % (ref 18–48)
RELATIVE MONOCYTE (OHS): 9.3 % (ref 4–15)
RELATIVE NEUTROPHIL (OHS): 67.6 % (ref 38–73)
SODIUM SERPL-SCNC: 135 MMOL/L (ref 136–145)
SP GR UR STRIP: 1.01
SQUAMOUS #/AREA URNS AUTO: <1 /HPF
TROPONIN I SERPL HS-MCNC: 535 NG/L
TROPONIN I SERPL HS-MCNC: 600 NG/L
UROBILINOGEN UR STRIP-ACNC: NEGATIVE EU/DL
WBC # BLD AUTO: 9.52 K/UL (ref 3.9–12.7)
WBC #/AREA URNS AUTO: 53 /HPF (ref 0–5)

## 2025-05-21 PROCEDURE — 63700000 PHARM REV CODE 250 ALT 637 W/O HCPCS: Mod: HCNC | Performed by: STUDENT IN AN ORGANIZED HEALTH CARE EDUCATION/TRAINING PROGRAM

## 2025-05-21 PROCEDURE — 81001 URINALYSIS AUTO W/SCOPE: CPT | Mod: HCNC | Performed by: STUDENT IN AN ORGANIZED HEALTH CARE EDUCATION/TRAINING PROGRAM

## 2025-05-21 PROCEDURE — 84100 ASSAY OF PHOSPHORUS: CPT | Mod: HCNC | Performed by: STUDENT IN AN ORGANIZED HEALTH CARE EDUCATION/TRAINING PROGRAM

## 2025-05-21 PROCEDURE — 84484 ASSAY OF TROPONIN QUANT: CPT | Mod: HCNC | Performed by: STUDENT IN AN ORGANIZED HEALTH CARE EDUCATION/TRAINING PROGRAM

## 2025-05-21 PROCEDURE — 63600175 PHARM REV CODE 636 W HCPCS: Mod: HCNC | Performed by: STUDENT IN AN ORGANIZED HEALTH CARE EDUCATION/TRAINING PROGRAM

## 2025-05-21 PROCEDURE — 80048 BASIC METABOLIC PNL TOTAL CA: CPT | Mod: HCNC | Performed by: STUDENT IN AN ORGANIZED HEALTH CARE EDUCATION/TRAINING PROGRAM

## 2025-05-21 PROCEDURE — 36415 COLL VENOUS BLD VENIPUNCTURE: CPT | Mod: HCNC | Performed by: STUDENT IN AN ORGANIZED HEALTH CARE EDUCATION/TRAINING PROGRAM

## 2025-05-21 PROCEDURE — 99222 1ST HOSP IP/OBS MODERATE 55: CPT | Mod: HCNC,GC,, | Performed by: INTERNAL MEDICINE

## 2025-05-21 PROCEDURE — 25000003 PHARM REV CODE 250: Mod: HCNC | Performed by: STUDENT IN AN ORGANIZED HEALTH CARE EDUCATION/TRAINING PROGRAM

## 2025-05-21 PROCEDURE — 83735 ASSAY OF MAGNESIUM: CPT | Mod: HCNC | Performed by: STUDENT IN AN ORGANIZED HEALTH CARE EDUCATION/TRAINING PROGRAM

## 2025-05-21 PROCEDURE — 85025 COMPLETE CBC W/AUTO DIFF WBC: CPT | Mod: HCNC | Performed by: STUDENT IN AN ORGANIZED HEALTH CARE EDUCATION/TRAINING PROGRAM

## 2025-05-21 RX ORDER — GUAIFENESIN 600 MG/1
600 TABLET, EXTENDED RELEASE ORAL 2 TIMES DAILY PRN
Qty: 14 TABLET | Refills: 0 | Status: SHIPPED | OUTPATIENT
Start: 2025-05-21 | End: 2025-05-28

## 2025-05-21 RX ORDER — BENZONATATE 100 MG/1
100 CAPSULE ORAL 3 TIMES DAILY PRN
Qty: 30 CAPSULE | Refills: 0 | Status: SHIPPED | OUTPATIENT
Start: 2025-05-21 | End: 2025-05-31

## 2025-05-21 RX ORDER — GUAIFENESIN 600 MG/1
1200 TABLET, EXTENDED RELEASE ORAL 2 TIMES DAILY
Qty: 28 TABLET | Refills: 0 | Status: SHIPPED | OUTPATIENT
Start: 2025-05-21 | End: 2025-05-21

## 2025-05-21 RX ORDER — CEFPODOXIME PROXETIL 100 MG/1
200 TABLET, FILM COATED ORAL DAILY
Qty: 6 TABLET | Refills: 0 | Status: SHIPPED | OUTPATIENT
Start: 2025-05-22 | End: 2025-05-25

## 2025-05-21 RX ORDER — AZITHROMYCIN 500 MG/1
500 TABLET, FILM COATED ORAL DAILY
Qty: 1 TABLET | Refills: 0 | Status: SHIPPED | OUTPATIENT
Start: 2025-05-22 | End: 2025-05-23

## 2025-05-21 RX ADMIN — SEVELAMER CARBONATE 800 MG: 800 TABLET, FILM COATED ORAL at 06:05

## 2025-05-21 RX ADMIN — APIXABAN 5 MG: 5 TABLET, FILM COATED ORAL at 08:05

## 2025-05-21 RX ADMIN — TORSEMIDE 40 MG: 20 TABLET ORAL at 08:05

## 2025-05-21 RX ADMIN — CEFTRIAXONE 1 G: 1 INJECTION, POWDER, FOR SOLUTION INTRAMUSCULAR; INTRAVENOUS at 08:05

## 2025-05-21 RX ADMIN — AZITHROMYCIN DIHYDRATE 500 MG: 250 TABLET ORAL at 08:05

## 2025-05-21 RX ADMIN — OXYCODONE 5 MG: 5 TABLET ORAL at 11:05

## 2025-05-21 RX ADMIN — CLOPIDOGREL 75 MG: 75 TABLET ORAL at 08:05

## 2025-05-21 RX ADMIN — ATORVASTATIN CALCIUM 80 MG: 40 TABLET, FILM COATED ORAL at 08:05

## 2025-05-21 RX ADMIN — POLYETHYLENE GLYCOL 3350 17 G: 17 POWDER, FOR SOLUTION ORAL at 08:05

## 2025-05-21 RX ADMIN — THERA TABS 1 TABLET: TAB at 08:05

## 2025-05-22 ENCOUNTER — OFFICE VISIT (OUTPATIENT)
Dept: INTERNAL MEDICINE | Facility: CLINIC | Age: 70
End: 2025-05-22
Payer: MEDICARE

## 2025-05-22 ENCOUNTER — HOSPITAL ENCOUNTER (OUTPATIENT)
Dept: RADIOLOGY | Facility: HOSPITAL | Age: 70
Discharge: HOME | End: 2025-05-22
Attending: STUDENT IN AN ORGANIZED HEALTH CARE EDUCATION/TRAINING PROGRAM
Payer: MEDICARE

## 2025-05-22 ENCOUNTER — PATIENT MESSAGE (OUTPATIENT)
Dept: INTERNAL MEDICINE | Facility: CLINIC | Age: 70
End: 2025-05-22

## 2025-05-22 VITALS
WEIGHT: 204.38 LBS | OXYGEN SATURATION: 96 % | BODY MASS INDEX: 27.68 KG/M2 | HEIGHT: 72 IN | DIASTOLIC BLOOD PRESSURE: 62 MMHG | HEART RATE: 71 BPM | SYSTOLIC BLOOD PRESSURE: 124 MMHG

## 2025-05-22 DIAGNOSIS — Z09 HOSPITAL DISCHARGE FOLLOW-UP: Primary | ICD-10-CM

## 2025-05-22 DIAGNOSIS — R31.9 HEMATURIA, UNSPECIFIED TYPE: ICD-10-CM

## 2025-05-22 DIAGNOSIS — N18.6 ESRD (END STAGE RENAL DISEASE) ON DIALYSIS: ICD-10-CM

## 2025-05-22 DIAGNOSIS — Z99.2 ESRD (END STAGE RENAL DISEASE) ON DIALYSIS: ICD-10-CM

## 2025-05-22 DIAGNOSIS — J44.9 CHRONIC OBSTRUCTIVE PULMONARY DISEASE, UNSPECIFIED COPD TYPE: ICD-10-CM

## 2025-05-22 DIAGNOSIS — J18.9 COMMUNITY ACQUIRED PNEUMONIA OF LEFT LOWER LOBE OF LUNG: ICD-10-CM

## 2025-05-22 DIAGNOSIS — M25.551 RIGHT HIP PAIN: ICD-10-CM

## 2025-05-22 DIAGNOSIS — I10 PRIMARY HYPERTENSION: ICD-10-CM

## 2025-05-22 PROCEDURE — 73502 X-RAY EXAM HIP UNI 2-3 VIEWS: CPT | Mod: TC,HCNC,RT

## 2025-05-22 PROCEDURE — 99999 PR PBB SHADOW E&M-EST. PATIENT-LVL V: CPT | Mod: PBBFAC,HCNC,, | Performed by: STUDENT IN AN ORGANIZED HEALTH CARE EDUCATION/TRAINING PROGRAM

## 2025-05-22 PROCEDURE — 73502 X-RAY EXAM HIP UNI 2-3 VIEWS: CPT | Mod: 26,HCNC,RT, | Performed by: RADIOLOGY

## 2025-05-22 RX ORDER — PREDNISONE 20 MG/1
40 TABLET ORAL DAILY
Qty: 10 TABLET | Refills: 0 | Status: SHIPPED | OUTPATIENT
Start: 2025-05-22 | End: 2025-05-27
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
## 2025-05-23 ENCOUNTER — PATIENT OUTREACH (OUTPATIENT)
Dept: ADMINISTRATIVE | Facility: CLINIC | Age: 70
End: 2025-05-23
Payer: MEDICARE

## 2025-05-23 NOTE — TELEPHONE ENCOUNTER
LOV with Bernadine, Anatoliy LR MD , 5/22/2025 for hospital f/u    Pt inquiring about his troponin levels and notes that suspect sepsis. Please see pt attachments.     Last troponin 5/21 535  Blood cx were negative

## 2025-05-23 NOTE — PROGRESS NOTES
C3 nurse attempted to contact Ibrahima Phelps  for a TCC post hospital discharge follow up call. No answer. LVM requesting a callback at [phone].    The patient does not have a scheduled HOSFU appointment. Message sent to PCP's staff to assist with HOSFU appointment scheduling.

## 2025-05-25 LAB
BACTERIA BLD CULT: NORMAL
BACTERIA BLD CULT: NORMAL

## 2025-05-26 ENCOUNTER — TELEPHONE (OUTPATIENT)
Dept: INTERNAL MEDICINE | Facility: CLINIC | Age: 70
End: 2025-05-26
Payer: MEDICARE

## 2025-05-26 ENCOUNTER — LAB VISIT (OUTPATIENT)
Dept: LAB | Facility: HOSPITAL | Age: 70
End: 2025-05-26
Attending: STUDENT IN AN ORGANIZED HEALTH CARE EDUCATION/TRAINING PROGRAM
Payer: MEDICARE

## 2025-05-26 DIAGNOSIS — R31.9 HEMATURIA, UNSPECIFIED TYPE: ICD-10-CM

## 2025-05-26 LAB
BACTERIA #/AREA URNS AUTO: ABNORMAL /HPF
BILIRUB UR QL STRIP.AUTO: NEGATIVE
CLARITY UR: ABNORMAL
COLOR UR AUTO: YELLOW
GLUCOSE UR QL STRIP: NEGATIVE
HGB UR QL STRIP: ABNORMAL
HYALINE CASTS UR QL AUTO: 8 /LPF (ref 0–1)
KETONES UR QL STRIP: NEGATIVE
LEUKOCYTE ESTERASE UR QL STRIP: ABNORMAL
MICROSCOPIC COMMENT: ABNORMAL
NITRITE UR QL STRIP: NEGATIVE
PH UR STRIP: 7 [PH]
PROT UR QL STRIP: ABNORMAL
RBC #/AREA URNS AUTO: 17 /HPF (ref 0–4)
SP GR UR STRIP: 1.01
SQUAMOUS #/AREA URNS AUTO: 1 /HPF
UROBILINOGEN UR STRIP-ACNC: NEGATIVE EU/DL
WBC #/AREA URNS AUTO: >100 /HPF (ref 0–5)

## 2025-05-26 PROCEDURE — 87086 URINE CULTURE/COLONY COUNT: CPT | Mod: HCNC

## 2025-05-26 PROCEDURE — 81001 URINALYSIS AUTO W/SCOPE: CPT | Mod: HCNC

## 2025-05-27 LAB — BACTERIA UR CULT: NO GROWTH

## 2025-05-28 DIAGNOSIS — Z99.2 ESRD (END STAGE RENAL DISEASE) ON DIALYSIS: ICD-10-CM

## 2025-05-28 DIAGNOSIS — N18.6 ESRD (END STAGE RENAL DISEASE) ON DIALYSIS: ICD-10-CM

## 2025-05-28 DIAGNOSIS — K21.9 GASTROESOPHAGEAL REFLUX DISEASE, UNSPECIFIED WHETHER ESOPHAGITIS PRESENT: ICD-10-CM

## 2025-05-28 RX ORDER — FAMOTIDINE 20 MG/1
20 TABLET, FILM COATED ORAL DAILY
Qty: 12 TABLET | Refills: 3 | Status: SHIPPED | OUTPATIENT
Start: 2025-05-28 | End: 2026-05-28

## 2025-05-30 ENCOUNTER — OFFICE VISIT (OUTPATIENT)
Dept: ORTHOPEDICS | Facility: CLINIC | Age: 70
End: 2025-05-30
Payer: MEDICARE

## 2025-05-30 ENCOUNTER — HOSPITAL ENCOUNTER (OUTPATIENT)
Dept: RADIOLOGY | Facility: HOSPITAL | Age: 70
Discharge: HOME | End: 2025-05-30
Attending: PHYSICIAN ASSISTANT
Payer: MEDICARE

## 2025-05-30 VITALS — HEIGHT: 72 IN | WEIGHT: 204.94 LBS | BODY MASS INDEX: 27.76 KG/M2

## 2025-05-30 DIAGNOSIS — M54.41 ACUTE RIGHT-SIDED BACK PAIN WITH SCIATICA: Primary | ICD-10-CM

## 2025-05-30 DIAGNOSIS — M54.50 LUMBAR SPINE PAIN: ICD-10-CM

## 2025-05-30 PROCEDURE — 72110 X-RAY EXAM L-2 SPINE 4/>VWS: CPT | Mod: 26,HCNC,, | Performed by: RADIOLOGY

## 2025-05-30 PROCEDURE — 99999 PR PBB SHADOW E&M-EST. PATIENT-LVL V: CPT | Mod: PBBFAC,HCNC,, | Performed by: PHYSICIAN ASSISTANT

## 2025-05-30 PROCEDURE — 72110 X-RAY EXAM L-2 SPINE 4/>VWS: CPT | Mod: TC,HCNC

## 2025-05-30 RX ORDER — METHYLPREDNISOLONE 4 MG/1
TABLET ORAL
Qty: 1 EACH | Refills: 0 | Status: SHIPPED | OUTPATIENT
Start: 2025-05-30

## 2025-05-30 RX ORDER — METHOCARBAMOL 500 MG/1
500 TABLET, FILM COATED ORAL 4 TIMES DAILY
Qty: 40 TABLET | Refills: 0 | Status: SHIPPED | OUTPATIENT
Start: 2025-05-30

## 2025-05-30 NOTE — PROGRESS NOTES
SUBJECTIVE:     Chief Complaint & History of Present Illness:  Ibrahima Phelps is a Established  patient 69 y.o. male who is seen here today with a complaint of    Chief Complaint   Patient presents with    Right Knee - Pain    . History of Present Illness    - Mr. Phelps presents for evaluation of burning pain in the right thigh, just above the knee.    - Presents with right leg pain described as a burning sensation located just above the knee  - Pain onset was approximately 1-2 weeks ago  - Denies any specific incident or trauma causing the pain  - Pain is localized to a specific spot  - Characterizes the pain as primarily internal, describing it as a throbbing pain on the inside  - Pain is not particularly sore to touch  - Has a history of two herniated discs in his back, which appears to be chronic and potentially related to the current leg pain  - Denies any current treatment for his back condition  - XR Ear taken by Dr. Gallagher [facility] showed some arthritis, which seems unrelated to the current leg pain  - Denies pain extending beyond the localized area just above the knee       On a scale of 1-10, with 10 being worst pain imaginable, he rates this pain as 3 on good days and 6 on bad days.  he describes the pain as sore.    Review of patient's allergies indicates:   Allergen Reactions    Ace inhibitors Swelling    Iodine Itching    Verapamil Other (See Comments)     Other reaction(s): Unknown    Povidone-iodine Itching         Current Medications[1]    Past Medical History:   Diagnosis Date    Atrial fibrillation     CAD (coronary artery disease) 2006    MI in 2006    CHF (congestive heart failure) 2017    CKD (chronic kidney disease) stage 3, GFR 30-59 ml/min     Dr. Latif.  in transplanted kidney    CKD (chronic kidney disease) stage 5, GFR less than 15 ml/min 02/02/2024    COVID-19 02/07/2023    Diverticulosis     Hyperlipidemia     Hypertension     Keloid cicatrix     Metabolic bone disease     Other  "emphysema 10/01/2019    Pericarditis     S/P kidney transplant 1992    x2 (1992 & 2005),    Thrombocytopenia     Thyroid disease     Tobacco use 09/05/2014       Past Surgical History:   Procedure Laterality Date    AV FISTULA PLACEMENT Left 12/18/2023    Procedure: CREATION, AV FISTULA;  Surgeon: JUANI Melendez III, MD;  Location: [facility] OR 2ND FLR;  Service: Vascular;  Laterality: Left;  LUE AVF creation vs AVG insertion    CARDIOVERSION  04/30/15    CHOLECYSTECTOMY      COLONOSCOPY  April 20, 2011    Diverticulosis, repeat recommended in 3 yrs., repeat colonoscopy 2014 revealed 2 polyps.  He should return in 5 years.    COLONOSCOPY N/A 3/13/2020    Procedure: COLONOSCOPY;  Surgeon: Chon Casper MD;  Location: [facility] (4TH FLR);  Service: Endoscopy;  Laterality: N/A;  ok to hold coumadin x5days-see telephone encounter 2/4/20-tb    COLONOSCOPY N/A 2/4/2021    Procedure: COLONOSCOPY;  Surgeon: Chon Casper MD;  Location: [facility] (4TH FLR);  Service: Endoscopy;  Laterality: N/A;  Eliquis - per Dr. GAVIN Blunt ok to hold x 2 days and "restarted asap"- ERW  last prep "poor", [id number] ordered/ Pt requests Dr. Casper  prep ins. mailed - COVID screening on 2/1/21 PCW- ERW    COLONOSCOPY N/A 3/3/2021    Procedure: COLONOSCOPY;  Surgeon: Chon Casper MD;  Location: [facility] (4TH FLR);  Service: Endoscopy;  Laterality: N/A;  Patient with his second poor bowel pre and has poor prep today.  If patient not intersted or can't get colonoscopy tomorrow will need constipation bowel prep and will need to restart his Eliquis today.Thanks,Chon Blunt-ok to hold Eliquis 2 days prior      COVID     COLONOSCOPY N/A 12/6/2024    Procedure: COLONOSCOPY;  Surgeon: Chon Casper MD;  Location: [facility] (2ND FLR);  Service: Endoscopy;  Laterality: N/A;  Plavix-Eliquis pending/ HD T-Th-Sat- lab ordered  2/25/24 ECHO PA 49  ref by / suprep inst portal-RN  11/29-message to clearance nurse for plavix and " eliquis hold-tb  ok to hold Eliquis 2 days per Dr Parmar  ok to hold Plavix 5 days per MEENA Jackson PA-GT  12/2- p    CORONARY ANGIOGRAPHY N/A 2/28/2024    Procedure: ANGIOGRAM, CORONARY ARTERY;  Surgeon: Tylor Lincoln MD;  Location: [facility] CATH LAB;  Service: Cardiology;  Laterality: N/A;    CORONARY ANGIOPLASTY WITH STENT PLACEMENT  9/13/2006    FISTULOGRAM N/A 2/19/2024    Procedure: Fistulogram;  Surgeon: JUANI Melendez III, MD;  Location: [facility] CATH LAB;  Service: Peripheral Vascular;  Laterality: N/A;    FISTULOGRAM, WITH PTA Left 6/3/2024    Procedure: FISTULOGRAM, WITH PTA;  Surgeon: JUANI Melendez III, MD;  Location: [facility] OR [facility] FLR;  Service: Vascular;  Laterality: Left;  mGy: 51.58  GyCm2: 6.8285  Fluoro time: 5.1 min  Contrast: 28 cc    FISTULOGRAM, WITH PTA Left 3/5/2025    Procedure: FISTULOGRAM, WITH PTA;  Surgeon: JUANI Melendez III, MD;  [facility];  Service: Vascular;  Laterality: Left;    IRRIGATION AND DEBRIDEMENT Right 8/30/2023    Procedure: IRRIGATION AND DEBRIDEMENT, RIGHT MIDDLE FINGER;  Surgeon: Martin Larsen MD;  Location: [facility] OR [facility] FLR;  Service: Orthopedics;  Laterality: Right;    KIDNEY TRANSPLANT  2005    LEFT HEART CATHETERIZATION N/A 2/26/2024    Procedure: Left heart cath;  Surgeon: Tylor Lincoln MD;  Location: [facility] CATH LAB;  Service: Cardiology;  Laterality: N/A;    PARATHYROIDECTOMY      PERCUTANEOUS TRANSLUMINAL ANGIOPLASTY OF ARTERIOVENOUS FISTULA Left 2/19/2024    Procedure: PTA, AV FISTULA;  Surgeon: JUANI Melendez III, MD;  Location: [facility] CATH LAB;  Service: Peripheral Vascular;  Laterality: Left;    STENT, DRUG ELUTING, SINGLE VESSEL, CORONARY N/A 2/28/2024    Procedure: Stent, Drug Eluting, Single Vessel, Coronary;  Surgeon: Tylor Lincoln MD;  Location: [facility] CATH LAB;  Service: Cardiology;  Laterality: N/A;    STENT, PERIPHERAL GRAFT Left 3/5/2025    Procedure: STENT, PERIPHERAL GRAFT;  Surgeon: JUANI Melendez III, MD;   Location: [facility] OR [facility] FLR;  Service: Vascular;  Laterality: Left;  mGy 63.17  Gycm2 8.3386  flouro 63.17 min  contrast 38 ml    TREATMENT OF CARDIAC ARRHYTHMIA N/A 3/28/2022    Procedure: CARDIOVERSION;  Surgeon: Migue Blunt MD;  Location: [facility] EP LAB;  Service: Cardiology;  Laterality: N/A;  AF, DCC, MAC, GP, 3 PREP*RODRIGO deferred pt 100% compliant*    VENOPLASTY  6/3/2024    Procedure: ANGIOPLASTY, VEIN;  Surgeon: JUANI Melendez III, MD;  [facility];  Service: Vascular;;  Left subclavian       Vital Signs (Most Recent)  There were no vitals filed for this visit.        Review of Systems:  ROS:  Patient Active Problem List    Diagnosis Date Noted    Community acquired bacterial pneumonia 05/20/2025    Acute hypoxic respiratory failure 05/20/2025    Coronary artery disease involving native coronary artery of native heart without angina pectoris 05/09/2025    Hemodialysis-associated hypotension 05/06/2025    Orthostatic hypotension 05/05/2025    Hyperkalemia 02/28/2025    Tachycardia-bradycardia syndrome 09/12/2024    Mild aortic stenosis 09/12/2024    History of non-ST elevation myocardial infarction (NSTEMI) 09/12/2024    History of coronary angioplasty with insertion of stent 09/12/2024    Hypothyroidism 09/12/2024    Stenosis of arteriovenous dialysis fistula 03/22/2024    Calcified granuloma of lung 03/15/2024    Unspecified diastolic (congestive) heart failure 02/28/2024    Chronic obstructive pulmonary disease, unspecified 02/28/2024    Other emphysema 02/28/2024    Old myocardial infarction 02/28/2024    Anemia in chronic kidney disease 02/26/2024    Failed kidney transplant 02/24/2024     - see h/o kidney tranplant      Other disorders of phosphorus metabolism 02/24/2024    Syncope 02/19/2024    GERD (gastroesophageal reflux disease) 02/19/2024    ESRD (end stage renal disease) on dialysis 02/02/2024    Immunosuppressive management encounter following kidney transplant 11/23/2023     Consolidation lung 10/13/2023    Glucose intolerance 10/08/2023    Benign essential tremor 10/06/2023    Pyogenic arthritis of right hand 08/30/2023    Immunocompromised state due to drug therapy 03/17/2023    Pulmonary hypertension 02/10/2023    Memory changes 10/31/2022     Memory changes possibly reflect changes from mild cognitive impairment however given the patient's multiple medical problems very likely this could relate to underlying depression.      Snoring 10/23/2022    Atherosclerotic heart disease of native coronary artery without angina pectoris 10/23/2022    Former smoker 07/04/2022    Postural tremor 09/16/2021     Worsening tremor over the last several months.  Patient would like to discuss medication options to keep his symptoms under control.  No evidence of parkinsonism causing his symptoms.      Polyneuropathy 09/16/2021    Carotid artery disease 03/23/2021    Multiple lung nodules on CT 06/09/2020    Paroxysmal atrial fibrillation 09/25/2017    Hypertensive heart and kidney disease 02/22/2017    Elevated troponin 01/22/2017    H/O kidney transplant 07/10/2015    Long term (current) use of anticoagulants [V58.61] 05/04/2015    Impingement syndrome of left shoulder 03/02/2015    Calcific tendinitis of left shoulder 01/21/2015    Left rotator cuff tear 01/21/2015    History of adenomatous polyp of colon 09/29/2014    Secondary renal hyperparathyroidism 05/19/2014    Carotid artery bruit 01/17/2014    Primary hypertension     Chest pain in patient with CAD (coronary artery disease)     Mixed hyperlipidemia     Thrombocytopenia      OBJECTIVE:     PHYSICAL EXAM:  Height: 6' (182.9 cm) Weight: 92.9 kg (204 lb 14.7 oz), General Appearance: Well nourished, well developed, in no acute distress.  Neurological: Mood & affect are normal.  Back Exam:      Tenderness: Lumbar   Swelling:  None       Range of Motion:      Flexion: 80     Extension: 50     Lateral Bend:   Right 50                               Left 50     Rotation:          Right 60                              Left 70       SLR:                  Right + at 90 deg                             Left Negative       Muscle Strength      Abductor: 5/5     Adductor: 5/5     Quadriceps: 5/5     Hamstrings: 5/5       Sensation: Normal   Gait: Normal     full range of motion, no tenderness, palpable spasm or pain on motion, tenderness noted , sensory exam intact bilateral lower extremities        Hip Examination:  full painless range of motion, without tenderness    RADIOGRAPHS:  X-rays lumbar spine taken today films reviewed by me demonstrate significant disc space narrowing at L5-S1 with significant foraminal impingement compression of the last lumbar vertebrae with osteophytic spurring and leah    ASSESSMENT/PLAN:       ICD-10-CM ICD-9-CM   1. Acute right-sided back pain with sciatica  M54.41 724.3   2. Lumbar spine pain  M54.50 724.2       Plan: We discussed with the patient at length all the different treatment options available for his spine including anti-inflammatories, acetaminophen, rest, ice, physical therapy, strengthening and range of motion exercise, occasional cortisone injections for temporary relief, and finally possible surgical interventions  Assessment & Plan    M51.27 Other intervertebral disc displacement, lumbosacral region  M51.86 Other intervertebral disc disorders, lumbar region  M48.07 Spinal stenosis, lumbosacral region  M54.40 Lumbago with sciatica, unspecified side  M54.50 Low back pain, unspecified  M62.838 Other muscle spasm  H93.8X9 Other specified disorders of ear, unspecified ear    MEDICATIONS:  - Started steroid Dosepak to reduce nerve swelling.  - Started medication to relax muscles.    IMAGING:  - Ordered XR Back to evaluate for spinal abnormalities.    REFERRALS:  - Referred to PT for back-specific program, including stretches, massages, and gentle exercises.    PROCEDURES:  - Discussed potential future MRI and injections  if conservative treatment is not effective.    FOLLOW UP:  - Follow up with spine specialist in about 1 month if symptoms persist.              [1]   Current Outpatient Medications   Medication Sig Dispense Refill    acetaminophen (TYLENOL) 500 MG tablet Take 1 tablet (500 mg total) by mouth every 6 (six) hours as needed for Pain. 20 tablet 0    albuterol (VENTOLIN HFA) 90 mcg/actuation inhaler Inhale 2 puffs into the lungs every 6 (six) hours as needed for Wheezing or Shortness of Breath. Rescue 18 g 3    amiodarone (PACERONE) 200 MG Tab TAKE 1 TABLET BY MOUTH EVERY DAY 90 tablet 3    atorvastatin (LIPITOR) 80 MG tablet Take 1 tablet (80 mg total) by mouth once daily. 90 tablet 1    b complex vitamins (B COMPLEX-VITAMIN B12) tablet Take 1 tablet by mouth once daily. 90 tablet 3    benzonatate (TESSALON) 100 MG capsule Take 1 capsule (100 mg total) by mouth 3 (three) times daily as needed for Cough. 30 capsule 0    clopidogreL (PLAVIX) 75 mg tablet Take 1 tablet (75 mg total) by mouth once daily. 90 tablet 3    ELIQUIS 5 mg Tab TAKE 1 TABLET BY MOUTH TWICE A DAY 60 tablet 32    famotidine (PEPCID) 20 MG tablet Take 1 tablet (20 mg total) by mouth once daily. On dialysis days, please take this medicine after dialysis. Only taking on Tue, Thur, and  Sat. 12 tablet 3    fexofenadine HCl (ALLEGRA ORAL) Take 1 tablet by mouth daily as needed (allergies).      fluticasone furoate-vilanteroL (BREO) 100-25 mcg/dose diskus inhaler Inhale 1 puff into the lungs once daily. Controller. Use this inhaler every day. (Patient taking differently: Inhale 1 puff into the lungs daily as needed. Controller. Use this inhaler every day.)      fluticasone propionate (FLONASE) 50 mcg/actuation nasal spray 1 spray (50 mcg total) by Each Nostril route daily as needed for Allergies.      gabapentin (NEURONTIN) 300 MG capsule Take 1 capsule (300 mg total) by mouth every evening. (Patient taking differently: Take 300 mg by mouth every evening.  PRN) 90 capsule 3    ipratropium (ATROVENT) 21 mcg (0.03 %) nasal spray 2 sprays by Each Nostril route 3 (three) times daily. 30 mL 0    midodrine (PROAMATINE) 2.5 MG Tab Take 1 tablet (2.5 mg total) by mouth 3 (three) times daily as needed (if bloos pressure less than 100/60 mmHg). 270 tablet 0    multivit-min-FA-lycopen-lutein (CENTRUM SILVER) 0.4 mg-300 mcg- 250 mcg Tab Take 1 tablet by mouth once daily.      nitroGLYCERIN (NITROSTAT) 0.4 MG SL tablet Place 1 tablet (0.4 mg total) under the tongue every 5 (five) minutes as needed for Chest pain. 25 tablet 0    pulse oximeter (PULSE OXIMETER) device by Apply Externally route 2 (two) times a day. Use twice daily at 8 AM and 3 PM and record the value in [employer] as directed. 1 each 0    sevelamer carbonate (RENVELA) 800 mg Tab This medicine is used to treat your phosphorus level. Please take 1 tablet (800 mg) once a day with the largest meal of the day. 30 tablet 11    torsemide (DEMADEX) 20 MG Tab Take 2 tablets (40 mg total) by mouth 3 (three) times a week. 90 tablet 3    albuterol-ipratropium (DUO-NEB) 2.5 mg-0.5 mg/3 mL nebulizer solution Take 3 mLs by nebulization every 4 (four) hours as needed for Wheezing. Rescue 75 mL 0    methocarbamoL (ROBAXIN) 500 MG Tab Take 1 tablet (500 mg total) by mouth 4 (four) times daily. 40 tablet 0    methylPREDNISolone (MEDROL DOSEPACK) 4 mg tablet use as directed 1 each 0    omeprazole (PRILOSEC) 20 MG capsule Take 1 capsule (20 mg total) by mouth daily as needed (heartburn).       No current facility-administered medications for this visit.

## 2025-06-02 ENCOUNTER — HOSPITAL ENCOUNTER (OUTPATIENT)
Dept: RADIOLOGY | Facility: HOSPITAL | Age: 70
Discharge: HOME | End: 2025-06-02
Attending: NURSE PRACTITIONER
Payer: MEDICARE

## 2025-06-02 DIAGNOSIS — N18.6 STAGE 5 CHRONIC KIDNEY DISEASE ON CHRONIC DIALYSIS: ICD-10-CM

## 2025-06-02 DIAGNOSIS — Z99.2 STAGE 5 CHRONIC KIDNEY DISEASE ON CHRONIC DIALYSIS: ICD-10-CM

## 2025-06-02 PROCEDURE — 74181 MRI ABDOMEN W/O CONTRAST: CPT | Mod: TC,HCNC

## 2025-06-02 PROCEDURE — 74181 MRI ABDOMEN W/O CONTRAST: CPT | Mod: 26,HCNC,, | Performed by: RADIOLOGY

## 2025-06-07 ENCOUNTER — HOSPITAL ENCOUNTER (INPATIENT)
Facility: HOSPITAL | Age: 70
LOS: 2 days | Discharge: HOME | DRG: 871 | End: 2025-06-09
Attending: EMERGENCY MEDICINE | Admitting: HOSPITALIST
Payer: MEDICARE

## 2025-06-07 DIAGNOSIS — J44.9 CHRONIC OBSTRUCTIVE PULMONARY DISEASE, UNSPECIFIED COPD TYPE: Chronic | ICD-10-CM

## 2025-06-07 DIAGNOSIS — A41.9 SEPSIS WITHOUT ACUTE ORGAN DYSFUNCTION, DUE TO UNSPECIFIED ORGANISM: Primary | ICD-10-CM

## 2025-06-07 DIAGNOSIS — A41.9 SEVERE SEPSIS: ICD-10-CM

## 2025-06-07 DIAGNOSIS — Z13.6 SCREENING FOR CARDIOVASCULAR CONDITION: ICD-10-CM

## 2025-06-07 DIAGNOSIS — I21.4 NSTEMI (NON-ST ELEVATED MYOCARDIAL INFARCTION): ICD-10-CM

## 2025-06-07 DIAGNOSIS — R07.9 CHEST PAIN: ICD-10-CM

## 2025-06-07 DIAGNOSIS — R65.20 SEVERE SEPSIS: ICD-10-CM

## 2025-06-07 DIAGNOSIS — J18.9 PNEUMONIA OF RIGHT UPPER LOBE DUE TO INFECTIOUS ORGANISM: ICD-10-CM

## 2025-06-07 PROBLEM — G62.9 POLYNEUROPATHY: Chronic | Status: ACTIVE | Noted: 2021-09-16

## 2025-06-07 PROBLEM — I25.10 CORONARY ARTERY DISEASE INVOLVING NATIVE CORONARY ARTERY OF NATIVE HEART WITHOUT ANGINA PECTORIS: Chronic | Status: ACTIVE | Noted: 2025-05-09

## 2025-06-07 PROBLEM — R55 SYNCOPE: Status: RESOLVED | Noted: 2024-02-19 | Resolved: 2025-06-07

## 2025-06-07 PROBLEM — J96.01 ACUTE HYPOXIC RESPIRATORY FAILURE: Status: RESOLVED | Noted: 2025-05-20 | Resolved: 2025-06-07

## 2025-06-07 PROBLEM — M00.9 PYOGENIC ARTHRITIS OF RIGHT HAND: Status: RESOLVED | Noted: 2023-08-30 | Resolved: 2025-06-07

## 2025-06-07 PROBLEM — E74.39 GLUCOSE INTOLERANCE: Status: RESOLVED | Noted: 2023-10-08 | Resolved: 2025-06-07

## 2025-06-07 PROBLEM — E03.9 HYPOTHYROIDISM: Chronic | Status: ACTIVE | Noted: 2024-09-12

## 2025-06-07 PROBLEM — N18.6 ESRD (END STAGE RENAL DISEASE) ON DIALYSIS: Chronic | Status: ACTIVE | Noted: 2024-02-02

## 2025-06-07 PROBLEM — Z99.2 ESRD (END STAGE RENAL DISEASE) ON DIALYSIS: Chronic | Status: ACTIVE | Noted: 2024-02-02

## 2025-06-07 PROBLEM — E87.5 HYPERKALEMIA: Status: RESOLVED | Noted: 2025-02-28 | Resolved: 2025-06-07

## 2025-06-07 PROBLEM — J18.1 CONSOLIDATION LUNG: Status: RESOLVED | Noted: 2023-10-13 | Resolved: 2025-06-07

## 2025-06-07 LAB
ABSOLUTE EOSINOPHIL (OHS): 0.17 K/UL
ABSOLUTE MONOCYTE (OHS): 0.66 K/UL (ref 0.3–1)
ABSOLUTE NEUTROPHIL COUNT (OHS): 12.05 K/UL (ref 1.8–7.7)
ALBUMIN SERPL BCP-MCNC: 3.1 G/DL (ref 3.5–5.2)
ALP SERPL-CCNC: 136 UNIT/L (ref 40–150)
ALT SERPL W/O P-5'-P-CCNC: 19 UNIT/L (ref 10–44)
ANION GAP (OHS): 16 MMOL/L (ref 8–16)
AST SERPL-CCNC: 21 UNIT/L (ref 11–45)
BASOPHILS # BLD AUTO: 0.02 K/UL
BASOPHILS NFR BLD AUTO: 0.1 %
BILIRUB SERPL-MCNC: 0.3 MG/DL (ref 0.1–1)
BIPAP: 0
BIPAP: 0
BUN SERPL-MCNC: 56 MG/DL (ref 8–23)
CALCIUM SERPL-MCNC: 7.4 MG/DL (ref 8.7–10.5)
CHLORIDE SERPL-SCNC: 104 MMOL/L (ref 95–110)
CO2 SERPL-SCNC: 18 MMOL/L (ref 23–29)
CREAT SERPL-MCNC: 10.5 MG/DL (ref 0.5–1.4)
ERYTHROCYTE [DISTWIDTH] IN BLOOD BY AUTOMATED COUNT: 18.1 % (ref 11.5–14.5)
FIO2: 21 %
FIO2: 21 %
GFR SERPLBLD CREATININE-BSD FMLA CKD-EPI: 5 ML/MIN/1.73/M2
GLUCOSE SERPL-MCNC: 71 MG/DL (ref 70–110)
HCT VFR BLD AUTO: 38.3 % (ref 40–54)
HGB BLD-MCNC: 12.1 GM/DL (ref 14–18)
IMM GRANULOCYTES # BLD AUTO: 0.13 K/UL (ref 0–0.04)
IMM GRANULOCYTES NFR BLD AUTO: 1 % (ref 0–0.5)
INFLUENZA A MOLECULAR (OHS): NEGATIVE
INFLUENZA B MOLECULAR (OHS): NEGATIVE
LACTATE SERPL-SCNC: 2 MMOL/L (ref 0.5–2.2)
LDH SERPL L TO P-CCNC: 2 MMOL/L
LDH SERPL L TO P-CCNC: 2.3 MMOL/L
LYMPHOCYTES # BLD AUTO: 0.48 K/UL (ref 1–4.8)
MAGNESIUM SERPL-MCNC: 2 MG/DL (ref 1.6–2.6)
MCH RBC QN AUTO: 26.5 PG (ref 27–31)
MCHC RBC AUTO-ENTMCNC: 31.6 G/DL (ref 32–36)
MCV RBC AUTO: 84 FL (ref 82–98)
MRSA PCR SCRN (OHS): NOT DETECTED
NUCLEATED RBC (/100WBC) (OHS): 0 /100 WBC
PHOSPHATE SERPL-MCNC: 5.8 MG/DL (ref 2.7–4.5)
PLATELET # BLD AUTO: 172 K/UL (ref 150–450)
PMV BLD AUTO: 11.1 FL (ref 9.2–12.9)
POC PERFORMED BY: NORMAL
POC PERFORMED BY: NORMAL
POC TEMPERATURE: 37 C
POC TEMPERATURE: 37 C
POTASSIUM SERPL-SCNC: 4.9 MMOL/L (ref 3.5–5.1)
PROT SERPL-MCNC: 6.6 GM/DL (ref 6–8.4)
RBC # BLD AUTO: 4.57 M/UL (ref 4.6–6.2)
RELATIVE EOSINOPHIL (OHS): 1.3 %
RELATIVE LYMPHOCYTE (OHS): 3.6 % (ref 18–48)
RELATIVE MONOCYTE (OHS): 4.9 % (ref 4–15)
RELATIVE NEUTROPHIL (OHS): 89.1 % (ref 38–73)
SARS-COV-2 RDRP RESP QL NAA+PROBE: NEGATIVE
SODIUM SERPL-SCNC: 138 MMOL/L (ref 136–145)
SPECIMEN SOURCE: NORMAL
SPECIMEN SOURCE: NORMAL
T4 FREE SERPL-MCNC: 0.96 NG/DL (ref 0.71–1.51)
TROPONIN I SERPL HS-MCNC: 1297 NG/L
TSH SERPL-ACNC: 1.9 UIU/ML (ref 0.4–4)
WBC # BLD AUTO: 13.51 K/UL (ref 3.9–12.7)

## 2025-06-07 PROCEDURE — 21400001 HC TELEMETRY ROOM: Mod: HCNC

## 2025-06-07 PROCEDURE — 96365 THER/PROPH/DIAG IV INF INIT: CPT | Mod: HCNC

## 2025-06-07 PROCEDURE — 83605 ASSAY OF LACTIC ACID: CPT | Mod: HCNC

## 2025-06-07 PROCEDURE — 96366 THER/PROPH/DIAG IV INF ADDON: CPT | Mod: HCNC

## 2025-06-07 PROCEDURE — 83735 ASSAY OF MAGNESIUM: CPT | Mod: HCNC | Performed by: EMERGENCY MEDICINE

## 2025-06-07 PROCEDURE — U0002 COVID-19 LAB TEST NON-CDC: HCPCS | Mod: HCNC | Performed by: EMERGENCY MEDICINE

## 2025-06-07 PROCEDURE — 36415 COLL VENOUS BLD VENIPUNCTURE: CPT | Mod: HCNC

## 2025-06-07 PROCEDURE — 85025 COMPLETE CBC W/AUTO DIFF WBC: CPT | Mod: HCNC | Performed by: EMERGENCY MEDICINE

## 2025-06-07 PROCEDURE — 84484 ASSAY OF TROPONIN QUANT: CPT | Mod: HCNC

## 2025-06-07 PROCEDURE — 87040 BLOOD CULTURE FOR BACTERIA: CPT | Mod: HCNC | Performed by: EMERGENCY MEDICINE

## 2025-06-07 PROCEDURE — 99900035 HC TECH TIME PER 15 MIN (STAT): Mod: HCNC

## 2025-06-07 PROCEDURE — 87502 INFLUENZA DNA AMP PROBE: CPT | Mod: HCNC | Performed by: EMERGENCY MEDICINE

## 2025-06-07 PROCEDURE — 87641 MR-STAPH DNA AMP PROBE: CPT | Mod: HCNC

## 2025-06-07 PROCEDURE — 96375 TX/PRO/DX INJ NEW DRUG ADDON: CPT | Mod: HCNC

## 2025-06-07 PROCEDURE — 63700000 PHARM REV CODE 250 ALT 637 W/O HCPCS: Mod: HCNC

## 2025-06-07 PROCEDURE — 63600175 PHARM REV CODE 636 W HCPCS: Mod: HCNC | Performed by: EMERGENCY MEDICINE

## 2025-06-07 PROCEDURE — 25000003 PHARM REV CODE 250: Mod: HCNC | Performed by: NURSE PRACTITIONER

## 2025-06-07 PROCEDURE — 84100 ASSAY OF PHOSPHORUS: CPT | Mod: HCNC | Performed by: EMERGENCY MEDICINE

## 2025-06-07 PROCEDURE — 99222 1ST HOSP IP/OBS MODERATE 55: CPT | Mod: HCNC,,, | Performed by: NURSE PRACTITIONER

## 2025-06-07 PROCEDURE — 93005 ELECTROCARDIOGRAM TRACING: CPT | Mod: HCNC

## 2025-06-07 PROCEDURE — 63600175 PHARM REV CODE 636 W HCPCS: Mod: HCNC

## 2025-06-07 PROCEDURE — 80100016 HC MAINTENANCE HEMODIALYSIS: Mod: HCNC

## 2025-06-07 PROCEDURE — 80053 COMPREHEN METABOLIC PANEL: CPT | Mod: HCNC | Performed by: EMERGENCY MEDICINE

## 2025-06-07 PROCEDURE — 25000003 PHARM REV CODE 250: Mod: HCNC

## 2025-06-07 PROCEDURE — 99285 EMERGENCY DEPT VISIT HI MDM: CPT | Mod: 25,HCNC

## 2025-06-07 PROCEDURE — 84439 ASSAY OF FREE THYROXINE: CPT | Mod: HCNC

## 2025-06-07 PROCEDURE — 84443 ASSAY THYROID STIM HORMONE: CPT | Mod: HCNC

## 2025-06-07 PROCEDURE — 25000003 PHARM REV CODE 250: Mod: HCNC | Performed by: EMERGENCY MEDICINE

## 2025-06-07 PROCEDURE — 93010 ELECTROCARDIOGRAM REPORT: CPT | Mod: HCNC,,, | Performed by: INTERNAL MEDICINE

## 2025-06-07 RX ORDER — SODIUM CHLORIDE 0.9 % (FLUSH) 0.9 %
10 SYRINGE (ML) INJECTION EVERY 12 HOURS PRN
Status: DISCONTINUED | OUTPATIENT
Start: 2025-06-07 | End: 2025-06-09 | Stop reason: HOSPADM

## 2025-06-07 RX ORDER — AMIODARONE HYDROCHLORIDE 200 MG/1
200 TABLET ORAL DAILY
Status: DISCONTINUED | OUTPATIENT
Start: 2025-06-08 | End: 2025-06-09 | Stop reason: HOSPADM

## 2025-06-07 RX ORDER — TORSEMIDE 20 MG/1
40 TABLET ORAL
Status: DISCONTINUED | OUTPATIENT
Start: 2025-06-09 | End: 2025-06-09 | Stop reason: HOSPADM

## 2025-06-07 RX ORDER — NALOXONE HCL 0.4 MG/ML
0.02 VIAL (ML) INJECTION
Status: DISCONTINUED | OUTPATIENT
Start: 2025-06-07 | End: 2025-06-09 | Stop reason: HOSPADM

## 2025-06-07 RX ORDER — MIDODRINE HYDROCHLORIDE 5 MG/1
10 TABLET ORAL
Status: DISCONTINUED | OUTPATIENT
Start: 2025-06-07 | End: 2025-06-07

## 2025-06-07 RX ORDER — HYDROXYZINE HYDROCHLORIDE 25 MG/1
25 TABLET, FILM COATED ORAL NIGHTLY PRN
Status: DISCONTINUED | OUTPATIENT
Start: 2025-06-07 | End: 2025-06-09 | Stop reason: HOSPADM

## 2025-06-07 RX ORDER — ACETAMINOPHEN 325 MG/1
650 TABLET ORAL EVERY 8 HOURS PRN
Status: DISCONTINUED | OUTPATIENT
Start: 2025-06-07 | End: 2025-06-09 | Stop reason: HOSPADM

## 2025-06-07 RX ORDER — FAMOTIDINE 20 MG/1
20 TABLET, FILM COATED ORAL
Status: DISCONTINUED | OUTPATIENT
Start: 2025-06-08 | End: 2025-06-09 | Stop reason: HOSPADM

## 2025-06-07 RX ORDER — VANCOMYCIN 2 GRAM/500 ML IN 0.9 % SODIUM CHLORIDE INTRAVENOUS
2000 ONCE
Status: COMPLETED | OUTPATIENT
Start: 2025-06-07 | End: 2025-06-07

## 2025-06-07 RX ORDER — GABAPENTIN 300 MG/1
300 CAPSULE ORAL NIGHTLY
Status: DISCONTINUED | OUTPATIENT
Start: 2025-06-07 | End: 2025-06-09 | Stop reason: HOSPADM

## 2025-06-07 RX ORDER — IBUPROFEN 200 MG
16 TABLET ORAL
Status: DISCONTINUED | OUTPATIENT
Start: 2025-06-07 | End: 2025-06-09 | Stop reason: HOSPADM

## 2025-06-07 RX ORDER — SODIUM CHLORIDE 9 MG/ML
INJECTION, SOLUTION INTRAVENOUS ONCE
Status: CANCELLED | OUTPATIENT
Start: 2025-06-07 | End: 2025-06-07

## 2025-06-07 RX ORDER — CLOPIDOGREL BISULFATE 75 MG/1
75 TABLET ORAL DAILY
Status: DISCONTINUED | OUTPATIENT
Start: 2025-06-08 | End: 2025-06-09 | Stop reason: HOSPADM

## 2025-06-07 RX ORDER — MIDODRINE HYDROCHLORIDE 2.5 MG/1
2.5 TABLET ORAL 3 TIMES DAILY PRN
Status: DISCONTINUED | OUTPATIENT
Start: 2025-06-07 | End: 2025-06-08

## 2025-06-07 RX ORDER — SEVELAMER CARBONATE 800 MG/1
800 TABLET, FILM COATED ORAL
Status: DISCONTINUED | OUTPATIENT
Start: 2025-06-07 | End: 2025-06-09 | Stop reason: HOSPADM

## 2025-06-07 RX ORDER — AZITHROMYCIN 250 MG/1
500 TABLET, FILM COATED ORAL NIGHTLY
Status: DISCONTINUED | OUTPATIENT
Start: 2025-06-07 | End: 2025-06-09

## 2025-06-07 RX ORDER — IBUPROFEN 200 MG
24 TABLET ORAL
Status: DISCONTINUED | OUTPATIENT
Start: 2025-06-07 | End: 2025-06-09 | Stop reason: HOSPADM

## 2025-06-07 RX ORDER — MIDODRINE HYDROCHLORIDE 5 MG/1
10 TABLET ORAL
Status: COMPLETED | OUTPATIENT
Start: 2025-06-07 | End: 2025-06-07

## 2025-06-07 RX ORDER — CEFEPIME HYDROCHLORIDE 1 G/1
1 INJECTION, POWDER, FOR SOLUTION INTRAMUSCULAR; INTRAVENOUS
Status: COMPLETED | OUTPATIENT
Start: 2025-06-07 | End: 2025-06-07

## 2025-06-07 RX ORDER — ATORVASTATIN CALCIUM 40 MG/1
80 TABLET, FILM COATED ORAL DAILY
Status: DISCONTINUED | OUTPATIENT
Start: 2025-06-08 | End: 2025-06-09 | Stop reason: HOSPADM

## 2025-06-07 RX ORDER — GLUCAGON 1 MG
1 KIT INJECTION
Status: DISCONTINUED | OUTPATIENT
Start: 2025-06-07 | End: 2025-06-09 | Stop reason: HOSPADM

## 2025-06-07 RX ADMIN — PIPERACILLIN SODIUM AND TAZOBACTAM SODIUM 4.5 G: 4; .5 INJECTION, POWDER, FOR SOLUTION INTRAVENOUS at 09:06

## 2025-06-07 RX ADMIN — MIDODRINE HYDROCHLORIDE 10 MG: 5 TABLET ORAL at 03:06

## 2025-06-07 RX ADMIN — APIXABAN 5 MG: 5 TABLET, FILM COATED ORAL at 09:06

## 2025-06-07 RX ADMIN — MIDODRINE HYDROCHLORIDE 2.5 MG: 2.5 TABLET ORAL at 11:06

## 2025-06-07 RX ADMIN — CEFEPIME 1 G: 1 INJECTION, POWDER, FOR SOLUTION INTRAMUSCULAR; INTRAVENOUS at 11:06

## 2025-06-07 RX ADMIN — AZITHROMYCIN DIHYDRATE 500 MG: 250 TABLET ORAL at 09:06

## 2025-06-07 RX ADMIN — SODIUM CHLORIDE 2000 MG: 9 INJECTION, SOLUTION INTRAVENOUS at 11:06

## 2025-06-07 RX ADMIN — GABAPENTIN 300 MG: 300 CAPSULE ORAL at 09:06

## 2025-06-07 NOTE — ED PROVIDER NOTES
Chief Complaint   Fever (Arrives via EMS with c/o fever and congested, 105 oral temp initially, received fluids and ice packs now last temp 102.9 hypotensive, on dialysis )      History Of Present Illness   Ibrahima Phelps is a 69 y.o. male presenting with fever and hypotension.  EMS was activated for fever and found an oral temp of 105.3° when they arrived on the scene.  They applied ice packs.  The patient is a dialysis patient with congestive heart failure as well.  The hypotension occurred while waiting for a bed in the ER.  The patient notes a history of intermittent hypotension that was treated with midodrine, but he was taken off of it about 3 weeks ago.    Independent Historian:  EMS provided some history including temp    Review of patient's allergies indicates:   Allergen Reactions    Ace inhibitors Swelling    Iodine Itching    Verapamil Other (See Comments)     Other reaction(s): Unknown    Povidone-iodine Itching       Medications Ordered Prior to Encounter[1]    Past History   As per HPI and below:  Past Medical History[2]  Past Surgical History[3]    Social History[4]    Family History   Problem Relation Name Age of Onset    No Known Problems Sister      No Known Problems Brother      Thyroid disease Mother          s/p surgery    Heart disease Father          had pacemaker    No Known Problems Sister      Kidney failure Sister      Kidney disease Sister      No Known Problems Sister      Kidney disease Brother      Kidney failure Brother          s/p transplant    Diabetes Mellitus Neg Hx      Stroke Neg Hx      Heart attack Neg Hx      Cancer Neg Hx      Celiac disease Neg Hx      Cirrhosis Neg Hx      Colon cancer Neg Hx      Esophageal cancer Neg Hx      Inflammatory bowel disease Neg Hx      Rectal cancer Neg Hx      Stomach cancer Neg Hx      Ulcerative colitis Neg Hx      Liver disease Neg Hx      Liver cancer Neg Hx      Crohn's disease Neg Hx      Melanoma Neg Hx         Physical Exam      Vitals:    06/07/25 1247 06/07/25 1300 06/07/25 1302 06/07/25 1400   BP: (!) 95/52  (!) 97/50 (!) 100/51   Patient Position:   Lying    Pulse: 83 80 79 78   Resp:   18    Temp:       TempSrc:       SpO2: 100% 99% 99% 100%   Weight:       Height:         Appearance: No acute distress.  Smiling, alert, joking, active.  Skin: No rashes seen.  Good turgor.  No abrasions.  No ecchymoses.  Eyes: No conjunctival injection.  Chest: Clear to auscultation bilaterally.  Good air movement.  No wheezes.  No rhonchi.  Cardiovascular: Regular rate and rhythm.  No murmurs. No gallops. No rubs.  Abdomen: Soft.  Not distended.  Nontender.  No guarding.  No rebound.  Musculoskeletal: Good range of motion all joints.  No deformities.  Neck supple.  No meningismus.  Neurologic: Motor intact.  Sensation intact.  Cranial nerves intact.  Mental Status:  Alert and oriented x 3.  Appropriate, conversant.      Initial MDM   Fever and hypotension, albeit in a patient who looks fabulous.  The temp of 105.3° and his overall appearance are somewhat incongruous, so I am hopeful he will respond well to treatment.  He last received acetaminophen 1000 mg at approximately 7:30 a.m..  Will give initial antibiotics for unknown source.  Given his ESRD and CHF, a 30 cc/kg bolus would be harmful.  He received 500 from EMS, so I will limit fluids to what comes with the vancomycin.  I will start pressors early if he does not respond.    External Records Reviewed: previous CXR fairly normal    Medications Given     Medications   vancomycin 2 g in 0.9% sodium chloride 500 mL IVPB (0 mg Intravenous Stopped 6/7/25 1442)   ceFEPIme injection 1 g (1 g Intravenous Given 6/7/25 1136)       Results and Course     Abnormal Labs Reviewed   COMPREHENSIVE METABOLIC PANEL - Abnormal; Notable for the following components:       Result Value    CO2 18 (*)     BUN 56 (*)     Creatinine 10.5 (*)     Calcium 7.4 (*)     Albumin 3.1 (*)     eGFR 5 (*)     All other  components within normal limits   PHOSPHORUS - Abnormal; Notable for the following components:    Phosphorus Level 5.8 (*)     All other components within normal limits   CBC WITH DIFFERENTIAL - Abnormal; Notable for the following components:    WBC 13.51 (*)     RBC 4.57 (*)     HGB 12.1 (*)     HCT 38.3 (*)     MCH 26.5 (*)     MCHC 31.6 (*)     RDW 18.1 (*)     Neut % 89.1 (*)     Lymph % 3.6 (*)     Imm Grans % 1.0 (*)     Neut # 12.05 (*)     Lymph # 0.48 (*)     Imm Grans # 0.13 (*)     All other components within normal limits       Imaging Results              X-Ray Chest AP Portable (Final result)  Result time 06/07/25 12:15:36      Final result by Rigo Irene MD (06/07/25 12:15:36)                   Impression:      Development of consolidation within much of the mid and upper right lung suggestive of right-sided pneumonia.    Improved aeration within the left lung when compared to prior study dated 05/20/2025.      Electronically signed by: Rigo Irene MD  Date:    06/07/2025  Time:    12:15               Narrative:    EXAMINATION:  XR CHEST AP PORTABLE    CLINICAL HISTORY:  Sepsis;    TECHNIQUE:  Single frontal view of the chest was performed.    COMPARISON:  05/20/2025.    FINDINGS:  The heart and mediastinal structures appear unremarkable.  Atherosclerotic calcification is present within the aortic arch.  Pulmonary vasculature appears to be within normal limits.  Since the prior examination there has been near complete clearance of the consolidation within the left mid and lower lung, however the patient has developed consolidation involving much of the mid and upper right lung.  Findings are most suggestive of right-sided pneumonia.  There is no evidence for pneumothorax or large pleural effusions.  Bony structures are grossly intact.                                      ED Course as of 06/07/25 1446   Sat Jun 07, 2025   1136 X-Ray Chest AP Portable  RUL PNA per my independent  interpretation.   [DC]   1223 WBC(!): 13.51 [DC]   1223 Hemoglobin(!): 12.1 [DC]   1223 Platelet Count: 172 [DC]   1435 Phosphorus Level(!): 5.8 [DC]   1435 Magnesium : 2.0 [DC]   1435 Creatinine(!): 10.5 [DC]   1435 BUN(!): 56 [DC]   1435 Potassium: 4.9 [DC]      ED Course User Index  [DC] Lavon Disla MD         Critical Care   Performed by: Lavon Disla MD   Authorized by: Lavon Disla MD    Total critical care time (exclusive of procedural time) : 80 minutes  Critical care was necessary to treat or prevent imminent or life-threatening deterioration of the following conditions:  sepsis, pneumonia          MDM, Impression and Plan   69 y.o. male with right upper lobe pneumonia.  Antibiotics given.  Very high temp has not returned.  Oxygen saturations are excellent.  Discussed with hospital medicine.  Will admit for further care.           Final diagnoses:  [Z13.6] Screening for cardiovascular condition  [A41.9] Sepsis without acute organ dysfunction, due to unspecified organism (Primary)  [J18.9] Pneumonia of right upper lobe due to infectious organism        ED Disposition Condition    Admit                     [1]   No current facility-administered medications on file prior to encounter.     Current Outpatient Medications on File Prior to Encounter   Medication Sig Dispense Refill    acetaminophen (TYLENOL) 500 MG tablet Take 1 tablet (500 mg total) by mouth every 6 (six) hours as needed for Pain. 20 tablet 0    albuterol (VENTOLIN HFA) 90 mcg/actuation inhaler Inhale 2 puffs into the lungs every 6 (six) hours as needed for Wheezing or Shortness of Breath. Rescue 18 g 3    albuterol-ipratropium (DUO-NEB) 2.5 mg-0.5 mg/3 mL nebulizer solution Take 3 mLs by nebulization every 4 (four) hours as needed for Wheezing. Rescue 75 mL 0    amiodarone (PACERONE) 200 MG Tab TAKE 1 TABLET BY MOUTH EVERY DAY 90 tablet 3    atorvastatin (LIPITOR) 80 MG tablet Take 1 tablet (80 mg total) by mouth once daily. 90  tablet 1    b complex vitamins (B COMPLEX-VITAMIN B12) tablet Take 1 tablet by mouth once daily. 90 tablet 3    clopidogreL (PLAVIX) 75 mg tablet Take 1 tablet (75 mg total) by mouth once daily. 90 tablet 3    ELIQUIS 5 mg Tab TAKE 1 TABLET BY MOUTH TWICE A DAY 60 tablet 32    famotidine (PEPCID) 20 MG tablet Take 1 tablet (20 mg total) by mouth once daily. On dialysis days, please take this medicine after dialysis. Only taking on Tue, Thur, and  Sat. 12 tablet 3    fexofenadine HCl (ALLEGRA ORAL) Take 1 tablet by mouth daily as needed (allergies).      fluticasone furoate-vilanteroL (BREO) 100-25 mcg/dose diskus inhaler Inhale 1 puff into the lungs once daily. Controller. Use this inhaler every day. (Patient taking differently: Inhale 1 puff into the lungs daily as needed. Controller. Use this inhaler every day.)      fluticasone propionate (FLONASE) 50 mcg/actuation nasal spray 1 spray (50 mcg total) by Each Nostril route daily as needed for Allergies.      gabapentin (NEURONTIN) 300 MG capsule Take 1 capsule (300 mg total) by mouth every evening. (Patient taking differently: Take 300 mg by mouth every evening. PRN) 90 capsule 3    ipratropium (ATROVENT) 21 mcg (0.03 %) nasal spray 2 sprays by Each Nostril route 3 (three) times daily. 30 mL 0    methocarbamoL (ROBAXIN) 500 MG Tab Take 1 tablet (500 mg total) by mouth 4 (four) times daily. 40 tablet 0    methylPREDNISolone (MEDROL DOSEPACK) 4 mg tablet use as directed 1 each 0    midodrine (PROAMATINE) 2.5 MG Tab Take 1 tablet (2.5 mg total) by mouth 3 (three) times daily as needed (if bloos pressure less than 100/60 mmHg). 270 tablet 0    multivit-min-FA-lycopen-lutein (CENTRUM SILVER) 0.4 mg-300 mcg- 250 mcg Tab Take 1 tablet by mouth once daily.      nitroGLYCERIN (NITROSTAT) 0.4 MG SL tablet Place 1 tablet (0.4 mg total) under the tongue every 5 (five) minutes as needed for Chest pain. 25 tablet 0    omeprazole (PRILOSEC) 20 MG capsule Take 1 capsule (20 mg  total) by mouth daily as needed (heartburn).      pulse oximeter (PULSE OXIMETER) device by Apply Externally route 2 (two) times a day. Use twice daily at 8 AM and 3 PM and record the value in MyChart as directed. 1 each 0    sevelamer carbonate (RENVELA) 800 mg Tab This medicine is used to treat your phosphorus level. Please take 1 tablet (800 mg) once a day with the largest meal of the day. 30 tablet 11    torsemide (DEMADEX) 20 MG Tab Take 2 tablets (40 mg total) by mouth 3 (three) times a week. 90 tablet 3   [2]   Past Medical History:  Diagnosis Date    Atrial fibrillation     CAD (coronary artery disease) 2006    MI in 2006    CHF (congestive heart failure) 2017    CKD (chronic kidney disease) stage 3, GFR 30-59 ml/min     Dr. Latif.  in transplanted kidney    CKD (chronic kidney disease) stage 5, GFR less than 15 ml/min 02/02/2024    COVID-19 02/07/2023    Diverticulosis     Hyperlipidemia     Hypertension     Keloid cicatrix     Metabolic bone disease     Other emphysema 10/01/2019    Pericarditis     S/P kidney transplant 1992    x2 (1992 & 2005),    Thrombocytopenia     Thyroid disease     Tobacco use 09/05/2014   [3]   Past Surgical History:  Procedure Laterality Date    AV FISTULA PLACEMENT Left 12/18/2023    Procedure: CREATION, AV FISTULA;  Surgeon: JUANI Melendez III, MD;  Location: [facility] OR 2ND FLR;  Service: Vascular;  Laterality: Left;  LUE AVF creation vs AVG insertion    CARDIOVERSION  04/30/15    CHOLECYSTECTOMY      COLONOSCOPY  April 20, 2011    Diverticulosis, repeat recommended in 3 yrs., repeat colonoscopy 2014 revealed 2 polyps.  He should return in 5 years.    COLONOSCOPY N/A 3/13/2020    Procedure: COLONOSCOPY;  Surgeon: Chon Casper MD;  Location: [facility] (4TH FLR);  Service: Endoscopy;  Laterality: N/A;  ok to hold coumadin x5days-see telephone encounter 2/4/20-tb    COLONOSCOPY N/A 2/4/2021    Procedure: COLONOSCOPY;  Surgeon: Chon Casper MD;  Location: [facility] (4TH  "FLR);  Service: Endoscopy;  Laterality: N/A;  Eliquis - per Dr. GAVIN Blunt ok to hold x 2 days and "restarted asap"- ERW  last prep "poor", [id number] ordered/ Pt requests Dr. Casper  prep ins. mailed - COVID screening on 2/1/21 PCW- ERW    COLONOSCOPY N/A 3/3/2021    Procedure: COLONOSCOPY;  Surgeon: Chon Casper MD;  Location: [facility] (4TH FLR);  Service: Endoscopy;  Laterality: N/A;  Patient with his second poor bowel pre and has poor prep today.  If patient not intersted or can't get colonoscopy tomorrow will need constipation bowel prep and will need to restart his Eliquis today.Thanks,Chon     per Dr Blunt-ok to hold Eliquis 2 days prior      COVID     COLONOSCOPY N/A 12/6/2024    Procedure: COLONOSCOPY;  Surgeon: Chon Casper MD;  Location: [facility] (2ND FLR);  Service: Endoscopy;  Laterality: N/A;  Plavix-Eliquis pending/ HD T-Th-Sat- lab ordered  2/25/24 ECHO PA 49  ref by / [facility] inst portal-RN  11/29-message to clearance nurse for plavix and eliquis hold-tb  ok to hold Eliquis 2 days per Dr Parmar  ok to hold Plavix 5 days per MEENA CUNNINGHAM-GT  12/2- p    CORONARY ANGIOGRAPHY N/A 2/28/2024    Procedure: ANGIOGRAM, CORONARY ARTERY;  Surgeon: Tylor Lincoln MD;  Location: [facility] CATH LAB;  Service: Cardiology;  Laterality: N/A;    CORONARY ANGIOPLASTY WITH STENT PLACEMENT  9/13/2006    FISTULOGRAM N/A 2/19/2024    Procedure: Fistulogram;  Surgeon: JUANI Melendez III, MD;  Location: [facility] CATH LAB;  Service: Peripheral Vascular;  Laterality: N/A;    FISTULOGRAM, WITH PTA Left 6/3/2024    Procedure: FISTULOGRAM, WITH PTA;  Surgeon: JUANI Melendez III, MD;  Location: [facility] OR 2ND FLR;  Service: Vascular;  Laterality: Left;  mGy: 51.58  GyCm2: 6.8285  Fluoro time: 5.1 min  Contrast: 28 cc    FISTULOGRAM, WITH PTA Left 3/5/2025    Procedure: FISTULOGRAM, WITH PTA;  Surgeon: JUANI Melendez III, MD;  [facility];  Service: Vascular;  Laterality: Left;    IRRIGATION AND " DEBRIDEMENT Right 8/30/2023    Procedure: IRRIGATION AND DEBRIDEMENT, RIGHT MIDDLE FINGER;  Surgeon: Martin Larsen MD;  [facility]  Service: Orthopedics;  Laterality: Right;    KIDNEY TRANSPLANT  2005    LEFT HEART CATHETERIZATION N/A 2/26/2024    Procedure: Left heart cath;  Surgeon: Tylor Lincoln MD;  Location: [facility] CATH LAB;  Service: Cardiology;  Laterality: N/A;    PARATHYROIDECTOMY      PERCUTANEOUS TRANSLUMINAL ANGIOPLASTY OF ARTERIOVENOUS FISTULA Left 2/19/2024    Procedure: PTA, AV FISTULA;  Surgeon: JUANI Melendez III, MD;  Location: [facility] CATH LAB;  Service: Peripheral Vascular;  Laterality: Left;    STENT, DRUG ELUTING, SINGLE VESSEL, CORONARY N/A 2/28/2024    Procedure: Stent, Drug Eluting, Single Vessel, Coronary;  Surgeon: Tylor Lincoln MD;  Location: [facility] CATH LAB;  Service: Cardiology;  Laterality: N/A;    STENT, PERIPHERAL GRAFT Left 3/5/2025    Procedure: STENT, PERIPHERAL GRAFT;  Surgeon: JUANI Melendez III, MD;  [facility];  Service: Vascular;  Laterality: Left;  mGy 63.17  Gycm2 8.3386  flouro 63.17 min  contrast 38 ml    TREATMENT OF CARDIAC ARRHYTHMIA N/A 3/28/2022    Procedure: CARDIOVERSION;  Surgeon: Migue Blunt MD;  Location: [facility] EP LAB;  Service: Cardiology;  Laterality: N/A;  AF, DCC, MAC, GP, 3 PREP*RODRIGO deferred pt 100% compliant*    VENOPLASTY  6/3/2024    Procedure: ANGIOPLASTY, VEIN;  Surgeon: JUANI Melendez III, MD;  [facility];  Service: Vascular;;  Left subclavian   [4]   Social History  Tobacco Use    Smoking status: Former     Current packs/day: 0.00     Average packs/day: 0.5 packs/day for 40.0 years (20.0 ttl pk-yrs)     Types: Cigarettes     Start date: 2/28/1982     Quit date: 2/28/2022     Years since quitting: 3.2    Smokeless tobacco: Never    Tobacco comments:     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     Patient is currently retired   Substance Use  Topics    Alcohol use: No    Drug use: No        Lavon Disla MD  06/07/25 [id number]

## 2025-06-07 NOTE — H&P
[facility] - Emergency Dept  [facility] Medicine  History & Physical    Patient Name: Ibrahima Phelps  MRN: [id number]  Patient Class: IP- Inpatient  Admission Date: 6/7/2025  Attending Physician: Beronica Joe*   Primary Care Provider: Anatoliy Colindres MD         Patient information was obtained from patient, spouse/SO, and ER records.     Subjective:     Principal Problem:Severe sepsis    Chief Complaint:   Chief Complaint   Patient presents with    Fever     Arrives via EMS with c/o fever and congested, 105 oral temp initially, received fluids and ice packs now last temp 102.9 hypotensive, on dialysis         HPI: 69-year-old male with a history of CAD s/p PCI 2006, PCI to RCA w/ 2 DIEGO 2/28/2024, paroxysmal Afib (on amiodarone and Eliquis), HFpEF, PAD, ESRD on HD TTS, COPD who presents with fevers, chills, generalized weakness and new congested cough; non-productive cough. Patient says that this feels like his previous episode of pneumonia last month. He Endorses that he has had pneumonia multiple times as a child and twice during his adulthood. Wife reports confusion and disorientation this morning. He receives dialysis Tu/Thurs/Sat; scheduled for dialysis this afternoon. Per EMS, on their arrival, pt's oral temperature was 105. Pt was given ~500 mL of LR en route + arrived w/ ice packs to neck. EMS reports patient's wife gave patient 1 g of Tylenol prior to their arrival.     Of note, patient was discharged on 5/21 and was treated for fevers, chills. Started on IV Rocephin and Azithromycin for for CAP coverage. Had troponin peak but low risk ACS per cardiology. He was discharged on PO antibiotics (azithromycin 500mg QD and cefpodoxime 200mg QD for PNA).    In the ED, patient was febrile (Tmax 102.9), hypotensive /46-51, HR 92-78. Labs were remarkable for leukocytosis (WBC 13.51), hgb 12.1, hct 38.3, plt 172, CO2 18, BUN 56, elevated Cr 10.5 (baseline has been increasing overtime, most recent 8-9),  "hypocalcemic (Ca 7.4). POC lactate trended down from 2.3 to 2. Flu was negative. CXR showed consolidation of mid and upper right lung suggestive of right-sided pneumonia. EKG showed nonspecific ST and T wave abnormality, unchanged from last EKG. He was given 1 dose of vancomycin + cefepime. Patient was admitted to hospital medicine for treatment of pneumonia.     Past Medical History:   Diagnosis Date    Atrial fibrillation     CAD (coronary artery disease) 2006    MI in 2006    CHF (congestive heart failure) 2017    CKD (chronic kidney disease) stage 3, GFR 30-59 ml/min     Dr. Latif.  in transplanted kidney    CKD (chronic kidney disease) stage 5, GFR less than 15 ml/min 02/02/2024    COVID-19 02/07/2023    Diverticulosis     Hyperlipidemia     Hypertension     Keloid cicatrix     Metabolic bone disease     Other emphysema 10/01/2019    Pericarditis     S/P kidney transplant 1992    x2 (1992 & 2005),    Thrombocytopenia     Thyroid disease     Tobacco use 09/05/2014       Past Surgical History:   Procedure Laterality Date    AV FISTULA PLACEMENT Left 12/18/2023    Procedure: CREATION, AV FISTULA;  Surgeon: JUANI Melendez III, MD;  [facility];  Service: Vascular;  Laterality: Left;  LUE AVF creation vs AVG insertion    CARDIOVERSION  04/30/15    CHOLECYSTECTOMY      COLONOSCOPY  April 20, 2011    Diverticulosis, repeat recommended in 3 yrs., repeat colonoscopy 2014 revealed 2 polyps.  He should return in 5 years.    COLONOSCOPY N/A 3/13/2020    Procedure: COLONOSCOPY;  Surgeon: Chon Casper MD;  Location: [facility] (4TH FLR);  Service: Endoscopy;  Laterality: N/A;  ok to hold coumadin x5days-see telephone encounter 2/4/20-tb    COLONOSCOPY N/A 2/4/2021    Procedure: COLONOSCOPY;  Surgeon: Chon Casper MD;  Location: [facility] (4TH FLR);  Service: Endoscopy;  Laterality: N/A;  Eliquis - per Dr. GAVIN Blunt ok to hold x 2 days and "restarted asap"- ERW  last prep "poor", [id number] ordered/ Pt " requests Dr. Casper  prep ins. mailed - COVID screening on 2/1/21 PCW- ERW    COLONOSCOPY N/A 3/3/2021    Procedure: COLONOSCOPY;  Surgeon: Chon Casper MD;  Location: [facility] (4TH FLR);  Service: Endoscopy;  Laterality: N/A;  Patient with his second poor bowel pre and has poor prep today.  If patient not intersted or can't get colonoscopy tomorrow will need constipation bowel prep and will need to restart his Eliquis today.Thanks,Chon     per Dr Lopez to hold Eliquis 2 days prior      COVID     COLONOSCOPY N/A 12/6/2024    Procedure: COLONOSCOPY;  Surgeon: Chon Casper MD;  Location: [facility] (2ND FLR);  Service: Endoscopy;  Laterality: N/A;  Plavix-Eliquis pending/ HD T-Th-Sat- lab ordered  2/25/24 ECHO PA 49  ref by / [facility] portal-RN  11/29-message to clearance nurse for plavix and eliquis hold-tb  ok to hold Eliquis 2 days per Dr Parmar  ok to hold Plavix 5 days per MEENA CUNNINGHAM-GT  12/2- p    CORONARY ANGIOGRAPHY N/A 2/28/2024    Procedure: ANGIOGRAM, CORONARY ARTERY;  Surgeon: Tylor Lincoln MD;  Location: [facility] CATH LAB;  Service: Cardiology;  Laterality: N/A;    CORONARY ANGIOPLASTY WITH STENT PLACEMENT  9/13/2006    FISTULOGRAM N/A 2/19/2024    Procedure: Fistulogram;  Surgeon: JUANI Melendez III, MD;  Location: [facility] CATH LAB;  Service: Peripheral Vascular;  Laterality: N/A;    FISTULOGRAM, WITH PTA Left 6/3/2024    Procedure: FISTULOGRAM, WITH PTA;  Surgeon: JUANI Melendez III, MD;  [facility];  Service: Vascular;  Laterality: Left;  mGy: 51.58  GyCm2: 6.8285  Fluoro time: 5.1 min  Contrast: 28 cc    FISTULOGRAM, WITH PTA Left 3/5/2025    Procedure: FISTULOGRAM, WITH PTA;  Surgeon: JUANI Melendez III, MD;  [facility];  Service: Vascular;  Laterality: Left;    IRRIGATION AND DEBRIDEMENT Right 8/30/2023    Procedure: IRRIGATION AND DEBRIDEMENT, RIGHT MIDDLE FINGER;  Surgeon: Martin Larsen MD;  Location: [facility] OR 2ND FLR;   Service: Orthopedics;  Laterality: Right;    KIDNEY TRANSPLANT  2005    LEFT HEART CATHETERIZATION N/A 2/26/2024    Procedure: Left heart cath;  Surgeon: Tylor Lincoln MD;  Location: [facility] CATH LAB;  Service: Cardiology;  Laterality: N/A;    PARATHYROIDECTOMY      PERCUTANEOUS TRANSLUMINAL ANGIOPLASTY OF ARTERIOVENOUS FISTULA Left 2/19/2024    Procedure: PTA, AV FISTULA;  Surgeon: JUANI Melendez III, MD;  Location: [facility] CATH LAB;  Service: Peripheral Vascular;  Laterality: Left;    STENT, DRUG ELUTING, SINGLE VESSEL, CORONARY N/A 2/28/2024    Procedure: Stent, Drug Eluting, Single Vessel, Coronary;  Surgeon: Tylor Lincoln MD;  Location: [facility] CATH LAB;  Service: Cardiology;  Laterality: N/A;    STENT, PERIPHERAL GRAFT Left 3/5/2025    Procedure: STENT, PERIPHERAL GRAFT;  Surgeon: JUANI Melendez III, MD;  [facility];  Service: Vascular;  Laterality: Left;  mGy 63.17  Gycm2 8.3386  flouro 63.17 min  contrast 38 ml    TREATMENT OF CARDIAC ARRHYTHMIA N/A 3/28/2022    Procedure: CARDIOVERSION;  Surgeon: Migue Blunt MD;  Location: [facility] EP LAB;  Service: Cardiology;  Laterality: N/A;  AF, DCC, MAC, GP, 3 PREP*RODRIGO deferred pt 100% compliant*    VENOPLASTY  6/3/2024    Procedure: ANGIOPLASTY, VEIN;  Surgeon: JUANI Melendez III, MD;  Location: [facility] OR [facility] FLR;  Service: Vascular;;  Left subclavian       Review of patient's allergies indicates:   Allergen Reactions    Ace inhibitors Swelling    Iodine Itching    Verapamil Other (See Comments)     Other reaction(s): Unknown    Povidone-iodine Itching       No current facility-administered medications on file prior to encounter.     Current Outpatient Medications on File Prior to Encounter   Medication Sig    acetaminophen (TYLENOL) 500 MG tablet Take 1 tablet (500 mg total) by mouth every 6 (six) hours as needed for Pain.    albuterol (VENTOLIN HFA) 90 mcg/actuation inhaler Inhale 2 puffs into the lungs every 6 (six) hours as needed for Wheezing  or Shortness of Breath. Rescue    albuterol-ipratropium (DUO-NEB) 2.5 mg-0.5 mg/3 mL nebulizer solution Take 3 mLs by nebulization every 4 (four) hours as needed for Wheezing. Rescue    amiodarone (PACERONE) 200 MG Tab TAKE 1 TABLET BY MOUTH EVERY DAY    atorvastatin (LIPITOR) 80 MG tablet Take 1 tablet (80 mg total) by mouth once daily.    b complex vitamins (B COMPLEX-VITAMIN B12) tablet Take 1 tablet by mouth once daily.    clopidogreL (PLAVIX) 75 mg tablet Take 1 tablet (75 mg total) by mouth once daily.    ELIQUIS 5 mg Tab TAKE 1 TABLET BY MOUTH TWICE A DAY    famotidine (PEPCID) 20 MG tablet Take 1 tablet (20 mg total) by mouth once daily. On dialysis days, please take this medicine after dialysis. Only taking on Tue, Thur, and  Sat.    fexofenadine HCl (ALLEGRA ORAL) Take 1 tablet by mouth daily as needed (allergies).    fluticasone furoate-vilanteroL (BREO) 100-25 mcg/dose diskus inhaler Inhale 1 puff into the lungs once daily. Controller. Use this inhaler every day. (Patient taking differently: Inhale 1 puff into the lungs daily as needed. Controller. Use this inhaler every day.)    fluticasone propionate (FLONASE) 50 mcg/actuation nasal spray 1 spray (50 mcg total) by Each Nostril route daily as needed for Allergies.    gabapentin (NEURONTIN) 300 MG capsule Take 1 capsule (300 mg total) by mouth every evening. (Patient taking differently: Take 300 mg by mouth every evening. PRN)    ipratropium (ATROVENT) 21 mcg (0.03 %) nasal spray 2 sprays by Each Nostril route 3 (three) times daily.    methocarbamoL (ROBAXIN) 500 MG Tab Take 1 tablet (500 mg total) by mouth 4 (four) times daily.    methylPREDNISolone (MEDROL DOSEPACK) 4 mg tablet use as directed    midodrine (PROAMATINE) 2.5 MG Tab Take 1 tablet (2.5 mg total) by mouth 3 (three) times daily as needed (if bloos pressure less than 100/60 mmHg).    multivit-min-FA-lycopen-lutein (CENTRUM SILVER) 0.4 mg-300 mcg- 250 mcg Tab Take 1 tablet by mouth once daily.     nitroGLYCERIN (NITROSTAT) 0.4 MG SL tablet Place 1 tablet (0.4 mg total) under the tongue every 5 (five) minutes as needed for Chest pain.    omeprazole (PRILOSEC) 20 MG capsule Take 1 capsule (20 mg total) by mouth daily as needed (heartburn).    pulse oximeter (PULSE OXIMETER) device by Apply Externally route 2 (two) times a day. Use twice daily at 8 AM and 3 PM and record the value in MyChart as directed.    sevelamer carbonate (RENVELA) 800 mg Tab This medicine is used to treat your phosphorus level. Please take 1 tablet (800 mg) once a day with the largest meal of the day.    torsemide (DEMADEX) 20 MG Tab Take 2 tablets (40 mg total) by mouth 3 (three) times a week.     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     Patient is currently retired   Substance and Sexual Activity    Alcohol use: No    Drug use: No    Sexual activity: Yes     Partners: Female     Review of Systems   Constitutional:  Positive for chills and fever. Negative for diaphoresis and fatigue.   HENT:  Negative for congestion.    Respiratory:  Positive for cough and shortness of breath. Negative for chest tightness and wheezing.    Cardiovascular:  Negative for chest pain, palpitations and leg swelling.   Gastrointestinal:  Negative for abdominal distention, abdominal pain, constipation, diarrhea, nausea and vomiting.   Endocrine: Negative.    Genitourinary: Negative.    Musculoskeletal:  Negative.    Skin: Negative.    Allergic/Immunologic: Negative.    Neurological:  Negative for dizziness and weakness.   Hematological: Negative.    Psychiatric/Behavioral: Negative.       Objective:     Vital Signs (Most Recent):  Temp: 99.2 °F (37.3 °C) (06/07/25 1148)  Pulse: 79 (06/07/25 1500)  Resp: 17 (06/07/25 1448)  BP: (!) 95/49 (06/07/25 1500)  SpO2: 100 % (06/07/25 1500) Vital Signs (24h Range):  Temp:  [99.2 °F (37.3 °C)-102.9 °F (39.4 °C)] 99.2 °F (37.3 °C)  Pulse:  [76-92] 79  Resp:  [17-20] 17  SpO2:  [96 %-100 %] 100 %  BP: ()/(46-55) 95/49     Weight: 90.7 kg (199 lb 15.3 oz)  Body mass index is 27.12 kg/m².     Physical Exam  Constitutional:       General: He is not in acute distress.     Appearance: Normal appearance. He is not ill-appearing, toxic-appearing or diaphoretic.   HENT:      Head: Normocephalic and atraumatic.   Cardiovascular:      Rate and Rhythm: Normal rate and regular rhythm.      Pulses: Normal pulses.      Heart sounds: Normal heart sounds. No murmur heard.     No friction rub. No gallop.   Pulmonary:      Effort: Pulmonary effort is normal. No respiratory distress.      Breath sounds: Examination of the right-upper field reveals decreased breath sounds and rales. Examination of the right-middle field reveals decreased breath sounds and rales. Decreased breath sounds and rales present. No wheezing.   Abdominal:      General: Abdomen is flat. There is no distension.      Palpations: Abdomen is soft.      Tenderness: There is no abdominal tenderness. There is no guarding.   Skin:     General: Skin is warm and dry.   Neurological:      General: No focal deficit present.      Mental Status: He is alert and oriented to person, place, and time. Mental status is at baseline.                Significant Labs: All pertinent labs within the past 24 hours have been reviewed.    Significant Imaging: I have reviewed all pertinent imaging results/findings within the past 24  "hours.  Assessment/Plan:     Assessment & Plan  Severe sepsis  This patient does have evidence of infective focus  My overall impression is sepsis with Acute kidney injury and Acute respiratory failure.  Source: Respiratory Infection  Antibiotics given-   Antibiotics (72h ago, onward)      Start     Stop Route Frequency Ordered    06/07/25 2100  azithromycin tablet 500 mg         -- Oral Nightly 06/07/25 1508    06/07/25 1930  piperacillin-tazobactam (ZOSYN) 4.5 g in D5W 100 mL IVPB (MB+)         -- IV Every 12 hours (non-standard times) 06/07/25 1508    06/07/25 1604  vancomycin - pharmacy to dose  (vancomycin IVPB (PEDS and ADULTS))        Placed in "And" Linked Group    -- IV pharmacy to manage frequency 06/07/25 1508          Latest lactate reviewed-  Recent Labs   Lab 06/07/25  1541 06/07/25  1548   LACTATE 2.0  --    POCLAC  --  2.0     Organ dysfunction indicated by Acute kidney injury and Acute respiratory failure    Fluid challenge Fluid Not Needed - Patient is not hypotensive and/or lactate is less than 4.0.     Post- resuscitation assessment Yes - I attest a sepsis perfusion exam was performed within 6 hours of sepsis, severe sepsis, or septic shock presentation, following fluid resuscitation.      Will Not start Pressors- Levophed for MAP of 65  Source control achieved by: antibiotics  Mixed hyperlipidemia  Continue home     Secondary renal hyperparathyroidism      Paroxysmal atrial fibrillation  Patient has paroxysmal (<7 days) atrial fibrillation. Patient is currently in sinus rhythm. [id number] Score: 2. The patients heart rate in the last 24 hours is as follows:  Pulse  Min: 65  Max: 92     Antiarrhythmics  amiodarone tablet 200 mg, Daily, Oral    Anticoagulants  apixaban tablet 5 mg, 2 times daily, Oral    Plan  - Replete lytes with a goal of K>4, Mg >2  - Patient is anticoagulated, see medications listed above.  - Patient's afib is currently controlled      Polyneuropathy  Continue home gabapentin " 300mg    ESRD (end stage renal disease) on dialysis  Creatine stable for now. BMP reviewed- noted Estimated Creatinine Clearance: 7.2 mL/min (A) (based on SCr of 10.5 mg/dL (H)). according to latest data. Based on current GFR, CKD stage is end stage.  Patient is on HD T/T/Sat    Plan:  - Monitor UOP and serial BMP and adjust therapy as needed.   - Renally dose meds.   - Avoid nephrotoxic medications and procedures.  - Nephrology consulted. Plan for dialysis 6/7.   - Continue sevelamer carbonate 800mg TID  - Continue Pepcid 20mg and Midodrine 10mg w/ dialysis  - Continue torsemide tablet 40 mg MWF      Chronic obstructive pulmonary disease, unspecified  Patient's PFTs in 2022 were normal. Patient declines using home inhalers at this time.   Coronary artery disease involving native coronary artery of native heart without angina pectoris  Patient with known CAD s/p stent placement and CABG, which is controlled Will continue Plavix and Statin and monitor for S/Sx of angina/ACS. Continue to monitor on telemetry.   Community acquired bacterial pneumonia  Patient has a diagnosis of pneumonia. The cause of the pneumonia is suspected to be bacterial in etiology but organism is not known. The pneumonia is stable. The patient has the following signs/symptoms of pneumonia: cough and shortness of breath. The patient does have a current oxygen requirement and the patient does not have a home oxygen requirement. I have reviewed the pertinent imaging. The following cultures have been collected: Blood cultures and Sputum culture The culture results are listed below.     Current antimicrobial regimen consists of the antibiotics listed below. Will monitor patient closely and continue current treatment plan unchanged.    Antibiotics (From admission, onward)      Start     Stop Route Frequency Ordered    06/07/25 2100  azithromycin tablet 500 mg         -- Oral Nightly 06/07/25 1508    06/07/25 1930  piperacillin-tazobactam (ZOSYN) 4.5  "g in D5W 100 mL IVPB (MB+)         -- IV Every 12 hours (non-standard times) 06/07/25 1508    06/07/25 1604  vancomycin - pharmacy to dose  (vancomycin IVPB (PEDS and ADULTS))        Placed in "And" Linked Group    -- IV pharmacy to manage frequency 06/07/25 1508            Microbiology Results (last 7 days)       Procedure Component Value Units Date/Time    MRSA Screen by PCR [[id number]] Collected: 06/07/25 1539    Order Status: Sent Specimen: Nasal Swab     Culture, Respiratory with Gram Stain [[id number]]     Order Status: Sent Specimen: Respiratory from Sputum     Influenza A & B by Molecular [[id number]]  (Normal) Collected: 06/07/25 1127    Order Status: Completed Specimen: Nasal Swab Updated: 06/07/25 1517     INFLUENZA A MOLECULAR Negative     INFLUENZA B MOLECULAR  Negative    Blood culture x two cultures. Draw prior to antibiotics. [[id number]] Collected: 06/07/25 1127    Order Status: Resulted Specimen: Blood from Peripheral, Antecubital, Right Updated: 06/07/25 1418    Blood culture x two cultures. Draw prior to antibiotics. [[id number]] Collected: 06/07/25 1132    Order Status: Resulted Specimen: Blood from Peripheral, Lower Arm, Right Updated: 06/07/25 1418          VTE Risk Mitigation (From admission, onward)           Ordered     apixaban tablet 5 mg  2 times daily         06/07/25 1526     IP VTE HIGH RISK PATIENT  Once         06/07/25 1515     Place sequential compression device  Until discontinued         06/07/25 1515                               Hd initiated, cannulated upper left arm avf with 15 gauge needles. B/P  138/60, pulse 66, o2 sat 99% on  2 liters nasal cannula.. Patient awake and alert. Uf set  for 1 liter as tolerated       Luis Enrique Sanchez DO  Department of Hospital Medicine  [facility] - Emergency Dept          "

## 2025-06-07 NOTE — PROGRESS NOTES
HD completed, net uf 1 liter removed. . Blood returned and needles pulled. Post bleeding time 5 minutes. Post B/P 134/88 , pulse 65, o2 sat 98% on 2 liters nasal cannula .

## 2025-06-07 NOTE — HPI
69-year-old male with a history of CAD s/p PCI 2006, PCI to RCA w/ 2 DIEGO 2/28/2024, paroxysmal Afib (on amiodarone and Eliquis), HFpEF, PAD, ESRD on HD TTS, COPD who presents with fevers, chills, generalized weakness and new congested cough; non-productive cough. Patient says that this feels like his previous episode of pneumonia last month. He Endorses that he has had pneumonia multiple times as a child and twice during his adulthood. Wife reports confusion and disorientation this morning. He receives dialysis Tu/Thurs/Sat; scheduled for dialysis this afternoon. Per EMS, on their arrival, pt's oral temperature was 105. Pt was given ~500 mL of LR en route + arrived w/ ice packs to neck. EMS reports patient's wife gave patient 1 g of Tylenol prior to their arrival.     Of note, patient was discharged on 5/21 and was treated for fevers, chills. Started on IV Rocephin and Azithromycin for for CAP coverage. Had troponin peak but low risk ACS per cardiology. He was discharged on PO antibiotics (azithromycin 500mg QD and cefpodoxime 200mg QD for PNA).    In the ED, patient was febrile (Tmax 102.9), hypotensive /46-51, HR 92-78. Labs were remarkable for leukocytosis (WBC 13.51), hgb 12.1, hct 38.3, plt 172, CO2 18, BUN 56, elevated Cr 10.5 (ESRD), hypocalcemic (Ca 7.4). POC lactate trended down from 2.3 to 2. Flu was negative. CXR showed consolidation of mid and upper right lung suggestive of right-sided pneumonia. EKG showed nonspecific ST and T wave abnormality, unchanged from last EKG. He was given 1 dose of vancomycin + cefepime. Patient was admitted to hospital medicine for treatment of pneumonia.

## 2025-06-07 NOTE — ASSESSMENT & PLAN NOTE
Creatine stable for now. BMP reviewed- noted Estimated Creatinine Clearance: 7.2 mL/min (A) (based on SCr of 10.5 mg/dL (H)). according to latest data. Based on current GFR, CKD stage is end stage.  Patient is on HD T/T/Sat    Plan:  - Monitor UOP and serial BMP and adjust therapy as needed.   - Renally dose meds.   - Avoid nephrotoxic medications and procedures.  - Nephrology consulted. Plan for dialysis 6/7.   - Continue sevelamer carbonate 800mg TID  - Continue Pepcid 20mg and Midodrine 10mg w/ dialysis  - Continue torsemide tablet 40 mg MWF

## 2025-06-07 NOTE — PROGRESS NOTES
Pharmacist Renal Dose Adjustment Note    Ibrahima Phelps is a 69 y.o. male being treated with zosyn 4.5 g every 8 hours for LRI    Patient Data:    Vital Signs (Most Recent):  Temp: 99.2 °F (37.3 °C) (06/07/25 1148)  Pulse: 79 (06/07/25 1500)  Resp: 17 (06/07/25 1448)  BP: (!) 95/49 (06/07/25 1500)  SpO2: 100 % (06/07/25 1500) Vital Signs (72h Range):  Temp:  [99.2 °F (37.3 °C)-102.9 °F (39.4 °C)]   Pulse:  [76-92]   Resp:  [17-20]   BP: ()/(46-55)   SpO2:  [96 %-100 %]      Recent Labs   Lab 06/07/25  1127   CREATININE 10.5*     Serum creatinine: 10.5 mg/dL (H) 06/07/25 1127  Estimated creatinine clearance: 7.2 mL/min (A)    Adjusted for dialysis pt to  Zosyn 4.5 g  every 12 hours per protocol     Pharmacist's Name: Naz Wolf  Pharmacist's Extension: [phone]

## 2025-06-07 NOTE — SUBJECTIVE & OBJECTIVE
"Past Medical History:   Diagnosis Date    Atrial fibrillation     CAD (coronary artery disease) 2006    MI in 2006    CHF (congestive heart failure) 2017    CKD (chronic kidney disease) stage 3, GFR 30-59 ml/min     Dr. Latif.  in transplanted kidney    CKD (chronic kidney disease) stage 5, GFR less than 15 ml/min 02/02/2024    COVID-19 02/07/2023    Diverticulosis     Hyperlipidemia     Hypertension     Keloid cicatrix     Metabolic bone disease     Other emphysema 10/01/2019    Pericarditis     S/P kidney transplant 1992    x2 (1992 & 2005),    Thrombocytopenia     Thyroid disease     Tobacco use 09/05/2014       Past Surgical History:   Procedure Laterality Date    AV FISTULA PLACEMENT Left 12/18/2023    Procedure: CREATION, AV FISTULA;  Surgeon: JUANI Melendez III, MD;  [facility];  Service: Vascular;  Laterality: Left;  LUE AVF creation vs AVG insertion    CARDIOVERSION  04/30/15    CHOLECYSTECTOMY      COLONOSCOPY  April 20, 2011    Diverticulosis, repeat recommended in 3 yrs., repeat colonoscopy 2014 revealed 2 polyps.  He should return in 5 years.    COLONOSCOPY N/A 3/13/2020    Procedure: COLONOSCOPY;  Surgeon: Chon Casper MD;  Location: [facility] (4TH FLR);  Service: Endoscopy;  Laterality: N/A;  ok to hold coumadin x5days-see telephone encounter 2/4/20-tb    COLONOSCOPY N/A 2/4/2021    Procedure: COLONOSCOPY;  Surgeon: Chon Casper MD;  Location: [facility] (4TH FLR);  Service: Endoscopy;  Laterality: N/A;  Eliquis - per Dr. GAVIN Blunt ok to hold x 2 days and "restarted asap"- ERW  last prep "poor", [id number] ordered/ Pt requests Dr. Casper  prep ins. mailed - COVID screening on 2/1/21 PCW- ERW    COLONOSCOPY N/A 3/3/2021    Procedure: COLONOSCOPY;  Surgeon: Chon Casper MD;  Location: [facility] (4TH FLR);  Service: Endoscopy;  Laterality: N/A;  Patient with his second poor bowel pre and has poor prep today.  If patient not intersted or can't get colonoscopy tomorrow will " need constipation bowel prep and will need to restart his Eliquis today.Thanks,Chon     per Dr Blunt-ok to hold Eliquis 2 days prior      COVID     COLONOSCOPY N/A 12/6/2024    Procedure: COLONOSCOPY;  Surgeon: Chon Casper MD;  Location: [facility] (2ND FLR);  Service: Endoscopy;  Laterality: N/A;  Plavix-Eliquis pending/ HD T-Th-Sat- lab ordered  2/25/24 ECHO PA 49  ref by / [facility] portal-RN  11/29-message to clearance nurse for plavix and eliquis hold-tb  ok to hold Eliquis 2 days per Dr Parmar  ok to hold Plavix 5 days per MEENA COLVINGT  12/2- p    CORONARY ANGIOGRAPHY N/A 2/28/2024    Procedure: ANGIOGRAM, CORONARY ARTERY;  Surgeon: Tylor Lincoln MD;  Location: [facility] CATH LAB;  Service: Cardiology;  Laterality: N/A;    CORONARY ANGIOPLASTY WITH STENT PLACEMENT  9/13/2006    FISTULOGRAM N/A 2/19/2024    Procedure: Fistulogram;  Surgeon: JUANI Melendez III, MD;  Location: [facility] CATH LAB;  Service: Peripheral Vascular;  Laterality: N/A;    FISTULOGRAM, WITH PTA Left 6/3/2024    Procedure: FISTULOGRAM, WITH PTA;  Surgeon: JUANI Melendez III, MD;  [facility];  Service: Vascular;  Laterality: Left;  mGy: 51.58  GyCm2: 6.8285  Fluoro time: 5.1 min  Contrast: 28 cc    FISTULOGRAM, WITH PTA Left 3/5/2025    Procedure: FISTULOGRAM, WITH PTA;  Surgeon: JUANI Melendez III, MD;  [facility];  Service: Vascular;  Laterality: Left;    IRRIGATION AND DEBRIDEMENT Right 8/30/2023    Procedure: IRRIGATION AND DEBRIDEMENT, RIGHT MIDDLE FINGER;  Surgeon: Martin Larsen MD;  [facility];  Service: Orthopedics;  Laterality: Right;    KIDNEY TRANSPLANT  2005    LEFT HEART CATHETERIZATION N/A 2/26/2024    Procedure: Left heart cath;  Surgeon: Tylor Lincoln MD;  Location: [facility] CATH LAB;  Service: Cardiology;  Laterality: N/A;    PARATHYROIDECTOMY      PERCUTANEOUS TRANSLUMINAL ANGIOPLASTY OF ARTERIOVENOUS FISTULA Left 2/19/2024    Procedure: PTA,  AV FISTULA;  Surgeon: JUANI Melednez III, MD;  Location: [facility] CATH LAB;  Service: Peripheral Vascular;  Laterality: Left;    STENT, DRUG ELUTING, SINGLE VESSEL, CORONARY N/A 2/28/2024    Procedure: Stent, Drug Eluting, Single Vessel, Coronary;  Surgeon: Tylor Lincoln MD;  Location: [facility] CATH LAB;  Service: Cardiology;  Laterality: N/A;    STENT, PERIPHERAL GRAFT Left 3/5/2025    Procedure: STENT, PERIPHERAL GRAFT;  Surgeon: JUANI Melendez III, MD;  Location: [facility] OR [facility] FLR;  Service: Vascular;  Laterality: Left;  mGy 63.17  Gycm2 8.3386  flouro 63.17 min  contrast 38 ml    TREATMENT OF CARDIAC ARRHYTHMIA N/A 3/28/2022    Procedure: CARDIOVERSION;  Surgeon: Migue Blunt MD;  Location: [facility] EP LAB;  Service: Cardiology;  Laterality: N/A;  AF, DCC, MAC, GP, 3 PREP*RODRIGO deferred pt 100% compliant*    VENOPLASTY  6/3/2024    Procedure: ANGIOPLASTY, VEIN;  Surgeon: JUANI Melendez III, MD;  [facility];  Service: Vascular;;  Left subclavian       Review of patient's allergies indicates:   Allergen Reactions    Ace inhibitors Swelling    Iodine Itching    Verapamil Other (See Comments)     Other reaction(s): Unknown    Povidone-iodine Itching       No current facility-administered medications on file prior to encounter.     Current Outpatient Medications on File Prior to Encounter   Medication Sig    acetaminophen (TYLENOL) 500 MG tablet Take 1 tablet (500 mg total) by mouth every 6 (six) hours as needed for Pain.    albuterol (VENTOLIN HFA) 90 mcg/actuation inhaler Inhale 2 puffs into the lungs every 6 (six) hours as needed for Wheezing or Shortness of Breath. Rescue    albuterol-ipratropium (DUO-NEB) 2.5 mg-0.5 mg/3 mL nebulizer solution Take 3 mLs by nebulization every 4 (four) hours as needed for Wheezing. Rescue    amiodarone (PACERONE) 200 MG Tab TAKE 1 TABLET BY MOUTH EVERY DAY    atorvastatin (LIPITOR) 80 MG tablet Take 1 tablet (80 mg total) by mouth once daily.    b complex vitamins (B  COMPLEX-VITAMIN B12) tablet Take 1 tablet by mouth once daily.    clopidogreL (PLAVIX) 75 mg tablet Take 1 tablet (75 mg total) by mouth once daily.    ELIQUIS 5 mg Tab TAKE 1 TABLET BY MOUTH TWICE A DAY    famotidine (PEPCID) 20 MG tablet Take 1 tablet (20 mg total) by mouth once daily. On dialysis days, please take this medicine after dialysis. Only taking on Tue, Thur, and  Sat.    fexofenadine HCl (ALLEGRA ORAL) Take 1 tablet by mouth daily as needed (allergies).    fluticasone furoate-vilanteroL (BREO) 100-25 mcg/dose diskus inhaler Inhale 1 puff into the lungs once daily. Controller. Use this inhaler every day. (Patient taking differently: Inhale 1 puff into the lungs daily as needed. Controller. Use this inhaler every day.)    fluticasone propionate (FLONASE) 50 mcg/actuation nasal spray 1 spray (50 mcg total) by Each Nostril route daily as needed for Allergies.    gabapentin (NEURONTIN) 300 MG capsule Take 1 capsule (300 mg total) by mouth every evening. (Patient taking differently: Take 300 mg by mouth every evening. PRN)    ipratropium (ATROVENT) 21 mcg (0.03 %) nasal spray 2 sprays by Each Nostril route 3 (three) times daily.    methocarbamoL (ROBAXIN) 500 MG Tab Take 1 tablet (500 mg total) by mouth 4 (four) times daily.    methylPREDNISolone (MEDROL DOSEPACK) 4 mg tablet use as directed    midodrine (PROAMATINE) 2.5 MG Tab Take 1 tablet (2.5 mg total) by mouth 3 (three) times daily as needed (if bloos pressure less than 100/60 mmHg).    multivit-min-FA-lycopen-lutein (CENTRUM SILVER) 0.4 mg-300 mcg- 250 mcg Tab Take 1 tablet by mouth once daily.    nitroGLYCERIN (NITROSTAT) 0.4 MG SL tablet Place 1 tablet (0.4 mg total) under the tongue every 5 (five) minutes as needed for Chest pain.    omeprazole (PRILOSEC) 20 MG capsule Take 1 capsule (20 mg total) by mouth daily as needed (heartburn).    pulse oximeter (PULSE OXIMETER) device by Apply Externally route 2 (two) times a day. Use twice daily at 8 AM and  3 PM and record the value in [facility] as directed.    sevelamer carbonate (RENVELA) 800 mg Tab This medicine is used to treat your phosphorus level. Please take 1 tablet (800 mg) once a day with the largest meal of the day.    torsemide (DEMADEX) 20 MG Tab Take 2 tablets (40 mg total) by mouth 3 (three) times a week.     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     Patient is currently retired   Substance and Sexual Activity    Alcohol use: No    Drug use: No    Sexual activity: Yes     Partners: Female     Review of Systems   Constitutional:  Positive for chills and fever. Negative for diaphoresis and fatigue.   HENT:  Negative for congestion.    Respiratory:  Positive for cough and shortness of breath. Negative for chest tightness and wheezing.    Cardiovascular:  Negative for chest pain, palpitations and leg swelling.   Gastrointestinal:  Negative for abdominal distention, abdominal pain, constipation, diarrhea, nausea and vomiting.   Endocrine: Negative.    Genitourinary: Negative.    Musculoskeletal: Negative.    Skin: Negative.    Allergic/Immunologic: Negative.    Neurological:  Negative for dizziness and weakness.   Hematological: Negative.    Psychiatric/Behavioral: Negative.       Objective:     Vital Signs (Most Recent):  Temp: 99.2 °F (37.3 °C) (06/07/25 1148)  Pulse: 79 (06/07/25 1500)  Resp: 17 (06/07/25 1448)  BP: (!) 95/49 (06/07/25 1500)  SpO2: 100  % (06/07/25 1500) Vital Signs (24h Range):  Temp:  [99.2 °F (37.3 °C)-102.9 °F (39.4 °C)] 99.2 °F (37.3 °C)  Pulse:  [76-92] 79  Resp:  [17-20] 17  SpO2:  [96 %-100 %] 100 %  BP: ()/(46-55) 95/49     Weight: 90.7 kg (199 lb 15.3 oz)  Body mass index is 27.12 kg/m².     Physical Exam  Constitutional:       General: He is not in acute distress.     Appearance: Normal appearance. He is not ill-appearing, toxic-appearing or diaphoretic.   HENT:      Head: Normocephalic and atraumatic.   Cardiovascular:      Rate and Rhythm: Normal rate and regular rhythm.      Pulses: Normal pulses.      Heart sounds: Normal heart sounds. No murmur heard.     No friction rub. No gallop.   Pulmonary:      Effort: Pulmonary effort is normal. No respiratory distress.      Breath sounds: Examination of the right-upper field reveals decreased breath sounds and rales. Examination of the right-middle field reveals decreased breath sounds and rales. Decreased breath sounds and rales present. No wheezing.   Abdominal:      General: Abdomen is flat. There is no distension.      Palpations: Abdomen is soft.      Tenderness: There is no abdominal tenderness. There is no guarding.   Skin:     General: Skin is warm and dry.   Neurological:      General: No focal deficit present.      Mental Status: He is alert and oriented to person, place, and time. Mental status is at baseline.                Significant Labs: All pertinent labs within the past 24 hours have been reviewed.    Significant Imaging: I have reviewed all pertinent imaging results/findings within the past 24 hours.

## 2025-06-07 NOTE — PROGRESS NOTES
Pharmacist Renal Dose Adjustment Note    Ibrahima Phelps is a 69 y.o. male being treated with famotidine 20 mg every 24 hours     Patient Data:    Vital Signs (Most Recent):  Temp: 99.2 °F (37.3 °C) (06/07/25 1148)  Pulse: 79 (06/07/25 1500)  Resp: 17 (06/07/25 1448)  BP: (!) 95/49 (06/07/25 1500)  SpO2: 100 % (06/07/25 1500) Vital Signs (72h Range):  Temp:  [99.2 °F (37.3 °C)-102.9 °F (39.4 °C)]   Pulse:  [76-92]   Resp:  [17-20]   BP: ()/(46-55)   SpO2:  [96 %-100 %]      Recent Labs   Lab 06/07/25  1127   CREATININE 10.5*     Serum creatinine: 10.5 mg/dL (H) 06/07/25 1127  Estimated creatinine clearance: 7.2 mL/min (A)    Adjusted to   Famotidine 20 mg every 48 hours per pharmacy renal adjustment protocol     Pharmacist's Name: Naz Wolf  Pharmacist's Extension: [id number]

## 2025-06-07 NOTE — ASSESSMENT & PLAN NOTE
Patient has paroxysmal (<7 days) atrial fibrillation. Patient is currently in sinus rhythm. [id number] Score: 2. The patients heart rate in the last 24 hours is as follows:  Pulse  Min: 65  Max: 92     Antiarrhythmics  amiodarone tablet 200 mg, Daily, Oral    Anticoagulants  apixaban tablet 5 mg, 2 times daily, Oral    Plan  - Replete lytes with a goal of K>4, Mg >2  - Patient is anticoagulated, see medications listed above.  - Patient's afib is currently controlled

## 2025-06-07 NOTE — PROGRESS NOTES
Hd initiated, cannulated upper left arm avf with 15 gauge needles. B/P  138/60, pulse 66, o2 sat 99% on  2 liters nasal cannula.. Patient awake and alert. Uf set  for 1 liter as tolerated

## 2025-06-07 NOTE — HPI
This is a 69-year-old male with a history of CAD s/p PCI 2006, PCI to RCA w/ 2 DIEGO 2/28/2024, paroxysmal Afib (on amiodarone and Eliquis), HFpEF, PAD, ESRD on HD TTS, COPD who presents with fever and hypotension.  EMS was activated for fever and found an oral temp of 105.3° when they arrived on the scene. Was given 500 ml LR. Chest x ray showed right lower pneumonia. On 2L NC. Denies SOB, chest pain, or swelling. Reports chills and fver. SBP 90s. Asymptomatic. Pt reports he use to be on midodrine, but has not needed it for last ~ 3 weeks. Last HD prior to presentation was on 6/5. Nephrology consulted for ESRD.

## 2025-06-07 NOTE — ASSESSMENT & PLAN NOTE
Patient with known CAD s/p stent placement and CABG, which is controlled Will continue Plavix and Statin and monitor for S/Sx of angina/ACS. Continue to monitor on telemetry.

## 2025-06-07 NOTE — PROGRESS NOTES
"Pharmacokinetic Initial Assessment & Plan: IV Vancomycin    IV Vancomycin 2000 mg x once was given in the ED on 6/7 @ 1138  ESRD on HD- Subsequent doses based on random Vancomycin levels  Obtain a Vancomycin random tomorrow on 6/8 @ 0600 with AM labs  Desired empiric serum trough concentration is 15 to 20 mcg/mL    Pharmacy will continue to follow and monitor vancomycin.    [id number] with any questions regarding this assessment.     Thank you for the consult,   Naz Wolf            Patient brief summary:  Ibrahima Phelps is a 69 y.o. male initiated on antimicrobial therapy with IV Vancomycin for treatment of suspected Pneumonia    Drug Allergies:   Review of patient's allergies indicates:   Allergen Reactions    Ace inhibitors Swelling    Iodine Itching    Verapamil Other (See Comments)     Other reaction(s): Unknown    Povidone-iodine Itching       Actual Body Weight:   90.7 kg    Renal Function:   Estimated Creatinine Clearance: 7.2 mL/min (A) (based on SCr of 10.5 mg/dL (H)).,     Dialysis Method (if applicable):  intermittent HD -dialysis Tu/Thurs/Sat;     CBC (last 72 hours):  Recent Labs   Lab Result Units 06/07/25  1127   WBC K/uL 13.51*   HGB gm/dL 12.1*   HCT % 38.3*   Platelet Count K/uL 172   Lymph % % 3.6*   Mono % % 4.9   Eos % % 1.3   Basophil % % 0.1       Metabolic Panel (last 72 hours):  Recent Labs   Lab Result Units 06/07/25  1127   Sodium mmol/L 138   Potassium mmol/L 4.9   Chloride mmol/L 104   CO2 mmol/L 18*   Glucose mg/dL 71   BUN mg/dL 56*   Creatinine mg/dL 10.5*   Albumin g/dL 3.1*   Bilirubin Total mg/dL 0.3   ALP unit/L 136   AST unit/L 21   ALT unit/L 19   Magnesium  mg/dL 2.0   Phosphorus Level mg/dL 5.8*       Drug levels (last 3 results):  No results for input(s): "VANCOMYCINRA", "VANCORANDOM", "VANCOMYCINPE", "VANCOPEAK", "VANCOMYCINTR", "VANCOTROUGH" in the last 72 hours.    Microbiologic Results:  Microbiology Results (last 7 days)       Procedure Component Value Units Date/Time    " Influenza A & B by Molecular [[id number]]  (Normal) Collected: 06/07/25 1127    Order Status: Completed Specimen: Nasal Swab Updated: 06/07/25 1517     INFLUENZA A MOLECULAR Negative     INFLUENZA B MOLECULAR  Negative    MRSA Screen by PCR [[id number]]     Order Status: Sent Specimen: Nasal Swab     Blood culture x two cultures. Draw prior to antibiotics. [[id number]] Collected: 06/07/25 1127    Order Status: Resulted Specimen: Blood from Peripheral, Antecubital, Right Updated: 06/07/25 1418    Blood culture x two cultures. Draw prior to antibiotics. [[id number]] Collected: 06/07/25 1132    Order Status: Resulted Specimen: Blood from Peripheral, Lower Arm, Right Updated: 06/07/25 1418

## 2025-06-07 NOTE — CONSULTS
[facility] - Emergency Dept  Nephrology  Consult Note    Patient Name: Ibrahima Phelps  MRN: [id number]  Admission Date: 6/7/2025  Hospital Length of Stay: 0 days  Attending Provider: Beronica Joe*   Primary Care Physician: Anatoliy Colindres MD  Principal Problem:<principal problem not specified>    Inpatient consult to Nephrology  Consult performed by: Mónica Camacho, HIRAM  Consult ordered by: Evan Porras MD  Reason for consult: ESRD        Subjective:     HPI: This is a 69-year-old male with a history of CAD s/p PCI 2006, PCI to RCA w/ 2 DIEGO 2/28/2024, paroxysmal Afib (on amiodarone and Eliquis), HFpEF, PAD, ESRD on HD TTS, COPD who presents with fever and hypotension.  EMS was activated for fever and found an oral temp of 105.3° when they arrived on the scene. Was given 500 ml LR. Chest x ray showed right lower pneumonia. On 2L NC. Denies SOB, chest pain, or swelling. Reports chills and fver. SBP 90s. Asymptomatic. Pt reports he use to be on midodrine, but has not needed it for last ~ 3 weeks. Last HD prior to presentation was on 6/5. Nephrology consulted for ESRD.     Past Medical History:   Diagnosis Date    Atrial fibrillation     CAD (coronary artery disease) 2006    MI in 2006    CHF (congestive heart failure) 2017    CKD (chronic kidney disease) stage 3, GFR 30-59 ml/min     Dr. Latif.  in transplanted kidney    CKD (chronic kidney disease) stage 5, GFR less than 15 ml/min 02/02/2024    COVID-19 02/07/2023    Diverticulosis     Hyperlipidemia     Hypertension     Keloid cicatrix     Metabolic bone disease     Other emphysema 10/01/2019    Pericarditis     S/P kidney transplant 1992    x2 (1992 & 2005),    Thrombocytopenia     Thyroid disease     Tobacco use 09/05/2014       Past Surgical History:   Procedure Laterality Date    AV FISTULA PLACEMENT Left 12/18/2023    Procedure: CREATION, AV FISTULA;  Surgeon: JUANI Melendez III, MD;  Location: [facility] OR [address] [place] [address];  Service: Vascular;   "Laterality: Left;  LUE AVF creation vs AVG insertion    CARDIOVERSION  04/30/15    CHOLECYSTECTOMY      COLONOSCOPY  April 20, 2011    Diverticulosis, repeat recommended in 3 yrs., repeat colonoscopy 2014 revealed 2 polyps.  He should return in 5 years.    COLONOSCOPY N/A 3/13/2020    Procedure: COLONOSCOPY;  Surgeon: Chon Casper MD;  Location: [facility] (4TH FLR);  Service: Endoscopy;  Laterality: N/A;  ok to hold coumadin x5days-see telephone encounter 2/4/20-tb    COLONOSCOPY N/A 2/4/2021    Procedure: COLONOSCOPY;  Surgeon: Chon Casper MD;  Location: [facility] (4TH FLR);  Service: Endoscopy;  Laterality: N/A;  Eliquis - per Dr. GAVIN Blunt ok to hold x 2 days and "restarted asap"- ERW  last prep "poor", [id number] ordered/ Pt requests Dr. Casper  prep ins. mailed - COVID screening on 2/1/21 PCW- ERW    COLONOSCOPY N/A 3/3/2021    Procedure: COLONOSCOPY;  Surgeon: Chon Casper MD;  Location: [facility] (4TH FLR);  Service: Endoscopy;  Laterality: N/A;  Patient with his second poor bowel pre and has poor prep today.  If patient not intersted or can't get colonoscopy tomorrow will need constipation bowel prep and will need to restart his Eliquis today.Thanks,Chon     per Dr Blunt-ok to hold Eliquis 2 days prior      COVID     COLONOSCOPY N/A 12/6/2024    Procedure: COLONOSCOPY;  Surgeon: Chon Casper MD;  Location: [facility] (2ND FLR);  Service: Endoscopy;  Laterality: N/A;  Plavix-Eliquis pending/ HD T-Th-Sat- lab ordered  2/25/24 ECHO PA 49  ref by / [facility] portal-RN  11/29-message to clearance nurse for plavix and eliquis hold-tb  ok to hold Eliquis 2 days per Dr Agata chua to hold Plavix 5 days per MEENA Jackson PA-GT  12/2- p    CORONARY ANGIOGRAPHY N/A 2/28/2024    Procedure: ANGIOGRAM, CORONARY ARTERY;  Surgeon: Tylor Lincoln MD;  Location: [facility] CATH LAB;  Service: Cardiology;  Laterality: N/A;    CORONARY ANGIOPLASTY WITH STENT PLACEMENT  9/13/2006    FISTULOGRAM N/A " 2/19/2024    Procedure: Fistulogram;  Surgeon: JUANI Melenedz III, MD;  Location: [facility] CATH LAB;  Service: Peripheral Vascular;  Laterality: N/A;    FISTULOGRAM, WITH PTA Left 6/3/2024    Procedure: FISTULOGRAM, WITH PTA;  Surgeon: JUANI Melendez III, MD;  Location: [facility] OR [facility] FLR;  Service: Vascular;  Laterality: Left;  mGy: 51.58  GyCm2: 6.8285  Fluoro time: 5.1 min  Contrast: 28 cc    FISTULOGRAM, WITH PTA Left 3/5/2025    Procedure: FISTULOGRAM, WITH PTA;  Surgeon: JUANI Melendez III, MD;  Location: [facility] OR [facility] FLR;  Service: Vascular;  Laterality: Left;    IRRIGATION AND DEBRIDEMENT Right 8/30/2023    Procedure: IRRIGATION AND DEBRIDEMENT, RIGHT MIDDLE FINGER;  Surgeon: Martin Larsen MD;  [facility];  Service: Orthopedics;  Laterality: Right;    KIDNEY TRANSPLANT  2005    LEFT HEART CATHETERIZATION N/A 2/26/2024    Procedure: Left heart cath;  Surgeon: Tylor Lincoln MD;  Location: [facility] CATH LAB;  Service: Cardiology;  Laterality: N/A;    PARATHYROIDECTOMY      PERCUTANEOUS TRANSLUMINAL ANGIOPLASTY OF ARTERIOVENOUS FISTULA Left 2/19/2024    Procedure: PTA, AV FISTULA;  Surgeon: JUANI Melendez III, MD;  Location: [facility] CATH LAB;  Service: Peripheral Vascular;  Laterality: Left;    STENT, DRUG ELUTING, SINGLE VESSEL, CORONARY N/A 2/28/2024    Procedure: Stent, Drug Eluting, Single Vessel, Coronary;  Surgeon: Tylor Lincoln MD;  Location: [facility] CATH LAB;  Service: Cardiology;  Laterality: N/A;    STENT, PERIPHERAL GRAFT Left 3/5/2025    Procedure: STENT, PERIPHERAL GRAFT;  Surgeon: JUANI Melendez III, MD;  Location: [facility] OR [facility] FLR;  Service: Vascular;  Laterality: Left;  mGy 63.17  Gycm2 8.3386  flouro 63.17 min  contrast 38 ml    TREATMENT OF CARDIAC ARRHYTHMIA N/A 3/28/2022    Procedure: CARDIOVERSION;  Surgeon: Migue Blunt MD;  Location: [facility] EP LAB;  Service: Cardiology;  Laterality: N/A;  AF, DCC, MAC, GP, 3 PREP*RODRIGO deferred pt 100% compliant*    VENOPLASTY   6/3/2024    Procedure: ANGIOPLASTY, VEIN;  Surgeon: JUANI Melendez III, MD;  Location: [facility] OR [address] [place] [address];  Service: Vascular;;  Left subclavian       Review of patient's allergies indicates:   Allergen Reactions    Ace inhibitors Swelling    Iodine Itching    Verapamil Other (See Comments)     Other reaction(s): Unknown    Povidone-iodine Itching     Current Facility-Administered Medications   Medication Frequency    acetaminophen tablet 650 mg Q8H PRN    [START ON 6/8/2025] amiodarone tablet 200 mg Daily    apixaban tablet 5 mg BID    [START ON 6/8/2025] atorvastatin tablet 80 mg Daily    azithromycin tablet 500 mg QHS    [START ON 6/8/2025] clopidogreL tablet 75 mg Daily    dextrose 50% injection 12.5 g PRN    dextrose 50% injection 25 g PRN    [START ON 6/8/2025] famotidine tablet 20 mg Daily    gabapentin capsule 300 mg QHS    glucagon (human recombinant) injection 1 mg PRN    glucose chewable tablet 16 g PRN    glucose chewable tablet 24 g PRN    midodrine tablet 10 mg Once in dialysis    midodrine tablet 2.5 mg TID PRN    naloxone 0.4 mg/mL injection 0.02 mg PRN    piperacillin-tazobactam (ZOSYN) 4.5 g in D5W 100 mL IVPB (MB+) Q12H    sevelamer carbonate tablet 800 mg TID WM    sodium chloride 0.9% flush 10 mL Q12H PRN    [START ON 6/9/2025] torsemide tablet 40 mg Once per day on Monday Wednesday Friday    vancomycin - pharmacy to dose pharmacy to manage frequency     Current Outpatient Medications   Medication    acetaminophen (TYLENOL) 500 MG tablet    albuterol (VENTOLIN HFA) 90 mcg/actuation inhaler    albuterol-ipratropium (DUO-NEB) 2.5 mg-0.5 mg/3 mL nebulizer solution    amiodarone (PACERONE) 200 MG Tab    atorvastatin (LIPITOR) 80 MG tablet    b complex vitamins (B COMPLEX-VITAMIN B12) tablet    clopidogreL (PLAVIX) 75 mg tablet    ELIQUIS 5 mg Tab    famotidine (PEPCID) 20 MG tablet    fexofenadine HCl (ALLEGRA ORAL)    fluticasone furoate-vilanteroL (BREO) 100-25 mcg/dose diskus inhaler     fluticasone propionate (FLONASE) 50 mcg/actuation nasal spray    gabapentin (NEURONTIN) 300 MG capsule    ipratropium (ATROVENT) 21 mcg (0.03 %) nasal spray    methocarbamoL (ROBAXIN) 500 MG Tab    methylPREDNISolone (MEDROL DOSEPACK) 4 mg tablet    midodrine (PROAMATINE) 2.5 MG Tab    multivit-min-FA-lycopen-lutein (CENTRUM SILVER) 0.4 mg-300 mcg- 250 mcg Tab    nitroGLYCERIN (NITROSTAT) 0.4 MG SL tablet    omeprazole (PRILOSEC) 20 MG capsule    pulse oximeter (PULSE OXIMETER) device    sevelamer carbonate (RENVELA) 800 mg Tab    torsemide (DEMADEX) 20 MG Tab     Family History       Problem Relation (Age of Onset)    Heart disease Father    Kidney disease Sister, Brother    Kidney failure Sister, Brother    No Known Problems Sister, Brother, Sister, Sister    Thyroid disease Mother          Tobacco Use    Smoking status: Former     Current packs/day: 0.00     Average packs/day: 0.5 packs/day for 40.0 years (20.0 ttl pk-yrs)     Types: Cigarettes     Start date: 2/28/1982     Quit date: 2/28/2022     Years since quitting: 3.2    Smokeless tobacco: Never    Tobacco comments:     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     Patient is currently retired   Substance and Sexual Activity    Alcohol use: No    Drug use: No    Sexual activity: Yes     Partners: Female     Review of Systems   Constitutional:  Positive for chills, fatigue and fever. Negative for appetite change.   HENT:  Negative for congestion.    Eyes:  Negative for visual disturbance.   Respiratory:  Positive for cough. Negative for shortness of breath.    Cardiovascular:  Negative for chest pain, palpitations and leg swelling.   Gastrointestinal:  Negative for abdominal distention, abdominal pain, diarrhea and nausea.   Genitourinary:  Positive for decreased urine volume.   Musculoskeletal:  Negative for arthralgias.   Skin: Negative.    Neurological:  Positive for weakness.   Psychiatric/Behavioral:  Negative for  agitation, behavioral problems and confusion.    Objective:     Vital Signs (Most Recent):  Temp: 99.2 °F (37.3 °C) (06/07/25 1148)  Pulse: 79 (06/07/25 1500)  Resp: 17 (06/07/25 1448)  BP: (!) 95/49 (06/07/25 1500)  SpO2: 100 % (06/07/25 1500) Vital Signs (24h Range):  Temp:  [99.2 °F (37.3 °C)-102.9 °F (39.4 °C)] 99.2 °F (37.3 °C)  Pulse:  [76-92] 79  Resp:  [17-20] 17  SpO2:  [96 %-100 %] 100 %  BP: ()/(46-55) 95/49     Weight: 90.7 kg (199 lb 15.3 oz) (06/07/25 1148)  Body mass index is 27.12 kg/m².  Body surface area is 2.15 meters squared.    No intake/output data recorded.     Physical Exam  Vitals and nursing note reviewed.   Constitutional:       General: He is not in acute distress.  HENT:      Mouth/Throat:      Pharynx: Oropharynx is clear.   Eyes:      Conjunctiva/sclera: Conjunctivae normal.   Cardiovascular:      Rate and Rhythm: Normal rate.      Pulses: Normal pulses.   Pulmonary:      Effort: Pulmonary effort is normal.      Breath sounds: Rales present.   Abdominal:      Palpations: Abdomen is soft.   Musculoskeletal:      Right lower leg: No edema.      Left lower leg: No edema.   Neurological:      Mental Status: He is alert and oriented to person, place, and time.   Psychiatric:         Mood and Affect: Mood normal.         Behavior: Behavior normal.      Significant Labs:  CBC:   Recent Labs   Lab 06/07/25  1127   WBC 13.51*   RBC 4.57*   HGB 12.1*   HCT 38.3*      MCV 84   MCH 26.5*   MCHC 31.6*     CMP:   Recent Labs   Lab 06/07/25  1127   GLU 71   CALCIUM 7.4*   ALBUMIN 3.1*   PROT 6.6      K 4.9   CO2 18*      BUN 56*   CREATININE 10.5*   ALKPHOS 136   ALT 19   AST 21   BILITOT 0.3     All labs within the past 24 hours have been reviewed.      Assessment/Plan:     Renal/  ESRD (end stage renal disease) on dialysis  Outpatient HD unit: [facility]  -Nephrologist: Immanuel COTTO  -HD tx days: TTS  -HD tx time: 3:30  -Last HD tx: 6/5  -HD access: AVF  -HD modality:  iHD  -Residual urine: +  -EDW:  90 kg      Plan/Recommendations:      - HD today for metabolic clearance, keep net even in setting of sepsis   - pre and post HD weight   - 1L fluid restrictions   - resume home phos binders   - midodrine prior to HD         Thank you for your consult. I will follow-up with patient. Please contact us if you have any additional questions.    Mónica Camacho DNP  Nephrology  [facility] - Emergency Dept

## 2025-06-07 NOTE — ED NOTES
Pt presents to ED via EMS from home w/ family member w/ c/o fever. Per EMS, on their arrival, pt's oral temperature was 105. Pt was given ~500 mL of LR en route + arrived w/ ice packs to neck. EMS reports patient's wife gave patient 1 g of Tylenol prior to their arrival. Wife reports confusion and disorientation this morning + pt AAOx4 at this time. Receives dialysis Tu/Thurs/Sat; scheduled for dialysis this afternoon. Pt reporting generalized weakness and new congested cough; non-productive at this time. Recently hospitalized for pneumonia. Pt became hypotensive upon arrival to ED + used to take Midodrine, however, has been off of it for a few weeks. Denies headache, chest pain, abdominal pain, chills, dysuria.    Patient identifiers for Ibrahima Phelps 69 y.o. male checked and correct.  Chief Complaint   Patient presents with    Fever     Arrives via EMS with c/o fever and congested, 105 oral temp initially, received fluids and ice packs now last temp 102.9 hypotensive, on dialysis      Past Medical History:   Diagnosis Date    Atrial fibrillation     CAD (coronary artery disease) 2006    MI in 2006    CHF (congestive heart failure) 2017    CKD (chronic kidney disease) stage 3, GFR 30-59 ml/min     Dr. Latif.  in transplanted kidney    CKD (chronic kidney disease) stage 5, GFR less than 15 ml/min 02/02/2024    COVID-19 02/07/2023    Diverticulosis     Hyperlipidemia     Hypertension     Keloid cicatrix     Metabolic bone disease     Other emphysema 10/01/2019    Pericarditis     S/P kidney transplant 1992    x2 (1992 & 2005),    Thrombocytopenia     Thyroid disease     Tobacco use 09/05/2014     Allergies reported:   Review of patient's allergies indicates:   Allergen Reactions    Ace inhibitors Swelling    Iodine Itching    Verapamil Other (See Comments)     Other reaction(s): Unknown    Povidone-iodine Itching

## 2025-06-07 NOTE — ASSESSMENT & PLAN NOTE
"Patient has a diagnosis of pneumonia. The cause of the pneumonia is suspected to be bacterial in etiology but organism is not known. The pneumonia is stable. The patient has the following signs/symptoms of pneumonia: cough and shortness of breath. The patient does have a current oxygen requirement and the patient does not have a home oxygen requirement. I have reviewed the pertinent imaging. The following cultures have been collected: Blood cultures and Sputum culture The culture results are listed below.     Current antimicrobial regimen consists of the antibiotics listed below. Will monitor patient closely and continue current treatment plan unchanged.    Antibiotics (From admission, onward)      Start     Stop Route Frequency Ordered    06/07/25 2100  azithromycin tablet 500 mg         -- Oral Nightly 06/07/25 1508    06/07/25 1930  piperacillin-tazobactam (ZOSYN) 4.5 g in D5W 100 mL IVPB (MB+)         -- IV Every 12 hours (non-standard times) 06/07/25 1508    06/07/25 1604  vancomycin - pharmacy to dose  (vancomycin IVPB (PEDS and ADULTS))        Placed in "And" Linked Group    -- IV pharmacy to manage frequency 06/07/25 1508            Microbiology Results (last 7 days)       Procedure Component Value Units Date/Time    MRSA Screen by PCR [[id number]] Collected: 06/07/25 1539    Order Status: Sent Specimen: Nasal Swab     Culture, Respiratory with Gram Stain [[id number]]     Order Status: Sent Specimen: Respiratory from Sputum     Influenza A & B by Molecular [[id number]]  (Normal) Collected: 06/07/25 1127    Order Status: Completed Specimen: Nasal Swab Updated: 06/07/25 1517     INFLUENZA A MOLECULAR Negative     INFLUENZA B MOLECULAR  Negative    Blood culture x two cultures. Draw prior to antibiotics. [[id number]] Collected: 06/07/25 1127    Order Status: Resulted Specimen: Blood from Peripheral, Antecubital, Right Updated: 06/07/25 1418    Blood culture x two cultures. Draw prior to antibiotics. " [[id number]] Collected: 06/07/25 1132    Order Status: Resulted Specimen: Blood from Peripheral, Lower Arm, Right Updated: 06/07/25 1410

## 2025-06-07 NOTE — ASSESSMENT & PLAN NOTE
Outpatient HD unit: [facility]  -Nephrologist: Immanuel COTTO  -HD tx days: TTS  -HD tx time: 3:30  -Last HD tx: 6/5  -HD access: AVF  -HD modality: iHD  -Residual urine: +  -EDW:  90 kg      Plan/Recommendations:      - HD today for metabolic clearance, keep net even in setting of sepsis   - pre and post HD weight   - 1L fluid restrictions   - resume home phos binders   - midodrine prior to HD

## 2025-06-07 NOTE — SUBJECTIVE & OBJECTIVE
"Past Medical History:   Diagnosis Date    Atrial fibrillation     CAD (coronary artery disease) 2006    MI in 2006    CHF (congestive heart failure) 2017    CKD (chronic kidney disease) stage 3, GFR 30-59 ml/min     Dr. Latif.  in transplanted kidney    CKD (chronic kidney disease) stage 5, GFR less than 15 ml/min 02/02/2024    COVID-19 02/07/2023    Diverticulosis     Hyperlipidemia     Hypertension     Keloid cicatrix     Metabolic bone disease     Other emphysema 10/01/2019    Pericarditis     S/P kidney transplant 1992    x2 (1992 & 2005),    Thrombocytopenia     Thyroid disease     Tobacco use 09/05/2014       Past Surgical History:   Procedure Laterality Date    AV FISTULA PLACEMENT Left 12/18/2023    Procedure: CREATION, AV FISTULA;  Surgeon: JUANI Melendez III, MD;  [facility];  Service: Vascular;  Laterality: Left;  LUE AVF creation vs AVG insertion    CARDIOVERSION  04/30/15    CHOLECYSTECTOMY      COLONOSCOPY  April 20, 2011    Diverticulosis, repeat recommended in 3 yrs., repeat colonoscopy 2014 revealed 2 polyps.  He should return in 5 years.    COLONOSCOPY N/A 3/13/2020    Procedure: COLONOSCOPY;  Surgeon: Chon Casper MD;  Location: [facility] (4TH FLR);  Service: Endoscopy;  Laterality: N/A;  ok to hold coumadin x5days-see telephone encounter 2/4/20-tb    COLONOSCOPY N/A 2/4/2021    Procedure: COLONOSCOPY;  Surgeon: Chon Casper MD;  Location: [facility] (4TH FLR);  Service: Endoscopy;  Laterality: N/A;  Eliquis - per Dr. GAVIN Blunt ok to hold x 2 days and "restarted asap"- ERW  last prep "poor", [id number] ordered/ Pt requests Dr. Casper  prep ins. mailed - COVID screening on 2/1/21 PCW- ERW    COLONOSCOPY N/A 3/3/2021    Procedure: COLONOSCOPY;  Surgeon: Chon Casper MD;  Location: [facility] (4TH FLR);  Service: Endoscopy;  Laterality: N/A;  Patient with his second poor bowel pre and has poor prep today.  If patient not intersted or can't get colonoscopy tomorrow will " need constipation bowel prep and will need to restart his Eliquis today.Thanks,Chon     per Dr Blunt-ok to hold Eliquis 2 days prior      COVID     COLONOSCOPY N/A 12/6/2024    Procedure: COLONOSCOPY;  Surgeon: Chon Casper MD;  Location: [facility] (2ND FLR);  Service: Endoscopy;  Laterality: N/A;  Plavix-Eliquis pending/ HD T-Th-Sat- lab ordered  2/25/24 ECHO PA 49  ref by / [facility] portal-RN  11/29-message to clearance nurse for plavix and eliquis hold-tb  ok to hold Eliquis 2 days per Dr Parmar  ok to hold Plavix 5 days per MEENA COLVINGT  12/2- p    CORONARY ANGIOGRAPHY N/A 2/28/2024    Procedure: ANGIOGRAM, CORONARY ARTERY;  Surgeon: Tylor Lincoln MD;  Location: [facility] CATH LAB;  Service: Cardiology;  Laterality: N/A;    CORONARY ANGIOPLASTY WITH STENT PLACEMENT  9/13/2006    FISTULOGRAM N/A 2/19/2024    Procedure: Fistulogram;  Surgeon: JUANI Melendez III, MD;  Location: [facility] CATH LAB;  Service: Peripheral Vascular;  Laterality: N/A;    FISTULOGRAM, WITH PTA Left 6/3/2024    Procedure: FISTULOGRAM, WITH PTA;  Surgeon: JUANI Melendez III, MD;  [facility];  Service: Vascular;  Laterality: Left;  mGy: 51.58  GyCm2: 6.8285  Fluoro time: 5.1 min  Contrast: 28 cc    FISTULOGRAM, WITH PTA Left 3/5/2025    Procedure: FISTULOGRAM, WITH PTA;  Surgeon: JUANI Melendez III, MD;  [facility];  Service: Vascular;  Laterality: Left;    IRRIGATION AND DEBRIDEMENT Right 8/30/2023    Procedure: IRRIGATION AND DEBRIDEMENT, RIGHT MIDDLE FINGER;  Surgeon: Martin Larsen MD;  [facility];  Service: Orthopedics;  Laterality: Right;    KIDNEY TRANSPLANT  2005    LEFT HEART CATHETERIZATION N/A 2/26/2024    Procedure: Left heart cath;  Surgeon: Tylor Lincoln MD;  Location: [facility] CATH LAB;  Service: Cardiology;  Laterality: N/A;    PARATHYROIDECTOMY      PERCUTANEOUS TRANSLUMINAL ANGIOPLASTY OF ARTERIOVENOUS FISTULA Left 2/19/2024    Procedure: PTA,  AV FISTULA;  Surgeon: JUANI Melendez III, MD;  Location: [facility] CATH LAB;  Service: Peripheral Vascular;  Laterality: Left;    STENT, DRUG ELUTING, SINGLE VESSEL, CORONARY N/A 2/28/2024    Procedure: Stent, Drug Eluting, Single Vessel, Coronary;  Surgeon: Tylor Lincoln MD;  Location: [facility] CATH LAB;  Service: Cardiology;  Laterality: N/A;    STENT, PERIPHERAL GRAFT Left 3/5/2025    Procedure: STENT, PERIPHERAL GRAFT;  Surgeon: JUANI Melendez III, MD;  [facility];  Service: Vascular;  Laterality: Left;  mGy 63.17  Gycm2 8.3386  flouro 63.17 min  contrast 38 ml    TREATMENT OF CARDIAC ARRHYTHMIA N/A 3/28/2022    Procedure: CARDIOVERSION;  Surgeon: Migue Blunt MD;  Location: [facility] EP LAB;  Service: Cardiology;  Laterality: N/A;  AF, DCC, MAC, GP, 3 PREP*RODRIGO deferred pt 100% compliant*    VENOPLASTY  6/3/2024    Procedure: ANGIOPLASTY, VEIN;  Surgeon: JUANI Melendez III, MD;  [facility];  Service: Vascular;;  Left subclavian       Review of patient's allergies indicates:   Allergen Reactions    Ace inhibitors Swelling    Iodine Itching    Verapamil Other (See Comments)     Other reaction(s): Unknown    Povidone-iodine Itching     Current Facility-Administered Medications   Medication Frequency    acetaminophen tablet 650 mg Q8H PRN    [START ON 6/8/2025] amiodarone tablet 200 mg Daily    apixaban tablet 5 mg BID    [START ON 6/8/2025] atorvastatin tablet 80 mg Daily    azithromycin tablet 500 mg QHS    [START ON 6/8/2025] clopidogreL tablet 75 mg Daily    dextrose 50% injection 12.5 g PRN    dextrose 50% injection 25 g PRN    [START ON 6/8/2025] famotidine tablet 20 mg Daily    gabapentin capsule 300 mg QHS    glucagon (human recombinant) injection 1 mg PRN    glucose chewable tablet 16 g PRN    glucose chewable tablet 24 g PRN    midodrine tablet 10 mg Once in dialysis    midodrine tablet 2.5 mg TID PRN    naloxone 0.4 mg/mL injection 0.02 mg PRN    piperacillin-tazobactam  (ZOSYN) 4.5 g in D5W 100 mL IVPB (MB+) Q12H    sevelamer carbonate tablet 800 mg TID WM    sodium chloride 0.9% flush 10 mL Q12H PRN    [START ON 6/9/2025] torsemide tablet 40 mg Once per day on Monday Wednesday Friday    vancomycin - pharmacy to dose pharmacy to manage frequency     Current Outpatient Medications   Medication    acetaminophen (TYLENOL) 500 MG tablet    albuterol (VENTOLIN HFA) 90 mcg/actuation inhaler    albuterol-ipratropium (DUO-NEB) 2.5 mg-0.5 mg/3 mL nebulizer solution    amiodarone (PACERONE) 200 MG Tab    atorvastatin (LIPITOR) 80 MG tablet    b complex vitamins (B COMPLEX-VITAMIN B12) tablet    clopidogreL (PLAVIX) 75 mg tablet    ELIQUIS 5 mg Tab    famotidine (PEPCID) 20 MG tablet    fexofenadine HCl (ALLEGRA ORAL)    fluticasone furoate-vilanteroL (BREO) 100-25 mcg/dose diskus inhaler    fluticasone propionate (FLONASE) 50 mcg/actuation nasal spray    gabapentin (NEURONTIN) 300 MG capsule    ipratropium (ATROVENT) 21 mcg (0.03 %) nasal spray    methocarbamoL (ROBAXIN) 500 MG Tab    methylPREDNISolone (MEDROL DOSEPACK) 4 mg tablet    midodrine (PROAMATINE) 2.5 MG Tab    multivit-min-FA-lycopen-lutein (CENTRUM SILVER) 0.4 mg-300 mcg- 250 mcg Tab    nitroGLYCERIN (NITROSTAT) 0.4 MG SL tablet    omeprazole (PRILOSEC) 20 MG capsule    pulse oximeter (PULSE OXIMETER) device    sevelamer carbonate (RENVELA) 800 mg Tab    torsemide (DEMADEX) 20 MG Tab     Family History       Problem Relation (Age of Onset)    Heart disease Father    Kidney disease Sister, Brother    Kidney failure Sister, Brother    No Known Problems Sister, Brother, Sister, Sister    Thyroid disease Mother          Tobacco Use    Smoking status: Former     Current packs/day: 0.00     Average packs/day: 0.5 packs/day for 40.0 years (20.0 ttl pk-yrs)     Types: Cigarettes     Start date: 2/28/1982     Quit date: 2/28/2022     Years since quitting: 3.2    Smokeless tobacco: Never    Tobacco comments:     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 He is not exercising.     Patient is currently retired   Substance and Sexual Activity    Alcohol use: No    Drug use: No    Sexual activity: Yes     Partners: Female     Review of Systems   Constitutional:  Positive for chills, fatigue and fever. Negative for appetite change.   HENT:  Negative for congestion.    Eyes:  Negative for visual disturbance.   Respiratory:  Positive for cough. Negative for shortness of breath.    Cardiovascular:  Negative for chest pain, palpitations and leg swelling.   Gastrointestinal:  Negative for abdominal distention, abdominal pain, diarrhea and nausea.   Genitourinary:  Positive for decreased urine volume.   Musculoskeletal:  Negative for arthralgias.   Skin: Negative.    Neurological:  Positive for weakness.   Psychiatric/Behavioral:  Negative for agitation, behavioral problems and confusion.    Objective:     Vital Signs (Most Recent):  Temp: 99.2 °F (37.3 °C) (06/07/25 1148)  Pulse: 79 (06/07/25 1500)  Resp: 17 (06/07/25 1448)  BP: (!) 95/49 (06/07/25 1500)  SpO2: 100 % (06/07/25 1500) Vital Signs (24h Range):  Temp:  [99.2 °F (37.3 °C)-102.9 °F (39.4 °C)] 99.2 °F (37.3 °C)  Pulse:  [76-92] 79  Resp:  [17-20] 17  SpO2:  [96 %-100 %] 100 %  BP: ()/(46-55) 95/49     Weight: 90.7 kg (199 lb 15.3 oz) (06/07/25 1148)  Body mass index is 27.12 kg/m².  Body surface area is 2.15 meters squared.    No intake/output data recorded.     Physical Exam  Vitals and nursing note reviewed.   Constitutional:       General: He is not in acute distress.  HENT:      Mouth/Throat:      Pharynx: Oropharynx is clear.   Eyes:      Conjunctiva/sclera: Conjunctivae normal.   Cardiovascular:      Rate and Rhythm: Normal rate.      Pulses: Normal pulses.   Pulmonary:      Effort: Pulmonary effort is normal.      Breath sounds: Rales present.   Abdominal:      Palpations: Abdomen is soft.   Musculoskeletal:      Right lower leg: No edema.      Left lower leg: No edema.    Neurological:      Mental Status: He is alert and oriented to person, place, and time.   Psychiatric:         Mood and Affect: Mood normal.         Behavior: Behavior normal.      Significant Labs:  CBC:   Recent Labs   Lab 06/07/25  1127   WBC 13.51*   RBC 4.57*   HGB 12.1*   HCT 38.3*      MCV 84   MCH 26.5*   MCHC 31.6*     CMP:   Recent Labs   Lab 06/07/25  1127   GLU 71   CALCIUM 7.4*   ALBUMIN 3.1*   PROT 6.6      K 4.9   CO2 18*      BUN 56*   CREATININE 10.5*   ALKPHOS 136   ALT 19   AST 21   BILITOT 0.3     All labs within the past 24 hours have been reviewed.

## 2025-06-07 NOTE — ASSESSMENT & PLAN NOTE
"This patient does have evidence of infective focus  My overall impression is sepsis with Acute kidney injury and Acute respiratory failure.  Source: Respiratory Infection  Antibiotics given-   Antibiotics (72h ago, onward)      Start     Stop Route Frequency Ordered    06/07/25 2100  azithromycin tablet 500 mg         -- Oral Nightly 06/07/25 1508    06/07/25 1930  piperacillin-tazobactam (ZOSYN) 4.5 g in D5W 100 mL IVPB (MB+)         -- IV Every 12 hours (non-standard times) 06/07/25 1508    06/07/25 1604  vancomycin - pharmacy to dose  (vancomycin IVPB (PEDS and ADULTS))        Placed in "And" Linked Group    -- IV pharmacy to manage frequency 06/07/25 1508          Latest lactate reviewed-  Recent Labs   Lab 06/07/25  1541 06/07/25  1548   LACTATE 2.0  --    POCLAC  --  2.0     Organ dysfunction indicated by Acute kidney injury and Acute respiratory failure    Fluid challenge Fluid Not Needed - Patient is not hypotensive and/or lactate is less than 4.0.     Post- resuscitation assessment Yes - I attest a sepsis perfusion exam was performed within 6 hours of sepsis, severe sepsis, or septic shock presentation, following fluid resuscitation.      Will Not start Pressors- Levophed for MAP of 65  Source control achieved by: antibiotics  "

## 2025-06-08 LAB
ABSOLUTE EOSINOPHIL (OHS): 0.35 K/UL
ABSOLUTE EOSINOPHIL (OHS): 0.4 K/UL
ABSOLUTE MONOCYTE (OHS): 1.11 K/UL (ref 0.3–1)
ABSOLUTE MONOCYTE (OHS): 1.3 K/UL (ref 0.3–1)
ABSOLUTE NEUTROPHIL COUNT (OHS): 13.01 K/UL (ref 1.8–7.7)
ABSOLUTE NEUTROPHIL COUNT (OHS): 15.25 K/UL (ref 1.8–7.7)
ALBUMIN SERPL BCP-MCNC: 2.6 G/DL (ref 3.5–5.2)
ALP SERPL-CCNC: 79 UNIT/L (ref 40–150)
ALT SERPL W/O P-5'-P-CCNC: 16 UNIT/L (ref 10–44)
ANION GAP (OHS): 12 MMOL/L (ref 8–16)
APTT PPP: 40.4 SECONDS (ref 21–32)
APTT PPP: 40.7 SECONDS (ref 21–32)
APTT PPP: 67.1 SECONDS (ref 21–32)
APTT PPP: 69.3 SECONDS (ref 21–32)
AST SERPL-CCNC: 20 UNIT/L (ref 11–45)
BASOPHILS # BLD AUTO: 0.03 K/UL
BASOPHILS # BLD AUTO: 0.04 K/UL
BASOPHILS NFR BLD AUTO: 0.2 %
BASOPHILS NFR BLD AUTO: 0.2 %
BILIRUB SERPL-MCNC: 0.5 MG/DL (ref 0.1–1)
BUN SERPL-MCNC: 46 MG/DL (ref 8–23)
CALCIUM SERPL-MCNC: 7.3 MG/DL (ref 8.7–10.5)
CHLORIDE SERPL-SCNC: 103 MMOL/L (ref 95–110)
CO2 SERPL-SCNC: 22 MMOL/L (ref 23–29)
CREAT SERPL-MCNC: 9 MG/DL (ref 0.5–1.4)
ERYTHROCYTE [DISTWIDTH] IN BLOOD BY AUTOMATED COUNT: 17.9 % (ref 11.5–14.5)
ERYTHROCYTE [DISTWIDTH] IN BLOOD BY AUTOMATED COUNT: 18 % (ref 11.5–14.5)
GFR SERPLBLD CREATININE-BSD FMLA CKD-EPI: 6 ML/MIN/1.73/M2
GLUCOSE SERPL-MCNC: 54 MG/DL (ref 70–110)
HCT VFR BLD AUTO: 30.3 % (ref 40–54)
HCT VFR BLD AUTO: 31.9 % (ref 40–54)
HGB BLD-MCNC: 10 GM/DL (ref 14–18)
HGB BLD-MCNC: 9.4 GM/DL (ref 14–18)
IMM GRANULOCYTES # BLD AUTO: 0.12 K/UL (ref 0–0.04)
IMM GRANULOCYTES # BLD AUTO: 0.21 K/UL (ref 0–0.04)
IMM GRANULOCYTES NFR BLD AUTO: 0.8 % (ref 0–0.5)
IMM GRANULOCYTES NFR BLD AUTO: 1.1 % (ref 0–0.5)
INR PPP: 1.5 (ref 0.8–1.2)
LACTATE SERPL-SCNC: 1.1 MMOL/L (ref 0.5–2.2)
LYMPHOCYTES # BLD AUTO: 1.22 K/UL (ref 1–4.8)
LYMPHOCYTES # BLD AUTO: 1.44 K/UL (ref 1–4.8)
MAGNESIUM SERPL-MCNC: 2 MG/DL (ref 1.6–2.6)
MCH RBC QN AUTO: 26.1 PG (ref 27–31)
MCH RBC QN AUTO: 26.2 PG (ref 27–31)
MCHC RBC AUTO-ENTMCNC: 31 G/DL (ref 32–36)
MCHC RBC AUTO-ENTMCNC: 31.3 G/DL (ref 32–36)
MCV RBC AUTO: 84 FL (ref 82–98)
MCV RBC AUTO: 84 FL (ref 82–98)
NUCLEATED RBC (/100WBC) (OHS): 0 /100 WBC
NUCLEATED RBC (/100WBC) (OHS): 0 /100 WBC
OHS QRS DURATION: 100 MS
OHS QRS DURATION: 98 MS
OHS QTC CALCULATION: 430 MS
OHS QTC CALCULATION: 450 MS
PHOSPHATE SERPL-MCNC: 5.2 MG/DL (ref 2.7–4.5)
PLATELET # BLD AUTO: 152 K/UL (ref 150–450)
PLATELET # BLD AUTO: 153 K/UL (ref 150–450)
PMV BLD AUTO: 11.1 FL (ref 9.2–12.9)
PMV BLD AUTO: 11.2 FL (ref 9.2–12.9)
POTASSIUM SERPL-SCNC: 4.7 MMOL/L (ref 3.5–5.1)
PROCALCITONIN SERPL-MCNC: 167.51 NG/ML
PROT SERPL-MCNC: 5.8 GM/DL (ref 6–8.4)
PROTHROMBIN TIME: 15.6 SECONDS (ref 9–12.5)
RBC # BLD AUTO: 3.6 M/UL (ref 4.6–6.2)
RBC # BLD AUTO: 3.82 M/UL (ref 4.6–6.2)
RELATIVE EOSINOPHIL (OHS): 1.9 %
RELATIVE EOSINOPHIL (OHS): 2.5 %
RELATIVE LYMPHOCYTE (OHS): 7.7 % (ref 18–48)
RELATIVE LYMPHOCYTE (OHS): 7.7 % (ref 18–48)
RELATIVE MONOCYTE (OHS): 7 % (ref 4–15)
RELATIVE MONOCYTE (OHS): 7 % (ref 4–15)
RELATIVE NEUTROPHIL (OHS): 81.8 % (ref 38–73)
RELATIVE NEUTROPHIL (OHS): 82.1 % (ref 38–73)
SODIUM SERPL-SCNC: 137 MMOL/L (ref 136–145)
TROPONIN I SERPL HS-MCNC: 1058 NG/L
TROPONIN I SERPL HS-MCNC: 1119 NG/L
TROPONIN I SERPL HS-MCNC: 1289 NG/L
TROPONIN I SERPL HS-MCNC: 915 NG/L
VANCOMYCIN SERPL-MCNC: 21.4 UG/ML (ref ?–80)
WBC # BLD AUTO: 15.89 K/UL (ref 3.9–12.7)
WBC # BLD AUTO: 18.59 K/UL (ref 3.9–12.7)

## 2025-06-08 PROCEDURE — 84100 ASSAY OF PHOSPHORUS: CPT | Mod: HCNC

## 2025-06-08 PROCEDURE — 85025 COMPLETE CBC W/AUTO DIFF WBC: CPT | Mod: HCNC

## 2025-06-08 PROCEDURE — 84484 ASSAY OF TROPONIN QUANT: CPT | Mod: HCNC

## 2025-06-08 PROCEDURE — 63600175 PHARM REV CODE 636 W HCPCS: Mod: HCNC

## 2025-06-08 PROCEDURE — 36415 COLL VENOUS BLD VENIPUNCTURE: CPT | Mod: HCNC

## 2025-06-08 PROCEDURE — 83735 ASSAY OF MAGNESIUM: CPT | Mod: HCNC

## 2025-06-08 PROCEDURE — 63700000 PHARM REV CODE 250 ALT 637 W/O HCPCS: Mod: HCNC

## 2025-06-08 PROCEDURE — 80053 COMPREHEN METABOLIC PANEL: CPT | Mod: HCNC

## 2025-06-08 PROCEDURE — 93010 ELECTROCARDIOGRAM REPORT: CPT | Mod: HCNC,,, | Performed by: INTERNAL MEDICINE

## 2025-06-08 PROCEDURE — 83605 ASSAY OF LACTIC ACID: CPT | Mod: HCNC

## 2025-06-08 PROCEDURE — 85610 PROTHROMBIN TIME: CPT | Mod: HCNC

## 2025-06-08 PROCEDURE — 84145 PROCALCITONIN (PCT): CPT | Mod: HCNC | Performed by: HOSPITALIST

## 2025-06-08 PROCEDURE — 80202 ASSAY OF VANCOMYCIN: CPT | Mod: HCNC | Performed by: HOSPITALIST

## 2025-06-08 PROCEDURE — 85730 THROMBOPLASTIN TIME PARTIAL: CPT | Mod: HCNC | Performed by: HOSPITALIST

## 2025-06-08 PROCEDURE — 93005 ELECTROCARDIOGRAM TRACING: CPT | Mod: HCNC

## 2025-06-08 PROCEDURE — 84484 ASSAY OF TROPONIN QUANT: CPT | Mod: HCNC | Performed by: HOSPITALIST

## 2025-06-08 PROCEDURE — 25000003 PHARM REV CODE 250: Mod: HCNC

## 2025-06-08 PROCEDURE — 21400001 HC TELEMETRY ROOM: Mod: HCNC

## 2025-06-08 PROCEDURE — 85730 THROMBOPLASTIN TIME PARTIAL: CPT | Mod: HCNC

## 2025-06-08 RX ORDER — ASPIRIN 325 MG
325 TABLET, DELAYED RELEASE (ENTERIC COATED) ORAL ONCE
Status: COMPLETED | OUTPATIENT
Start: 2025-06-08 | End: 2025-06-08

## 2025-06-08 RX ORDER — CEFTRIAXONE 1 G/1
1 INJECTION, POWDER, FOR SOLUTION INTRAMUSCULAR; INTRAVENOUS
Status: DISCONTINUED | OUTPATIENT
Start: 2025-06-08 | End: 2025-06-08

## 2025-06-08 RX ORDER — MIDODRINE HYDROCHLORIDE 5 MG/1
5 TABLET ORAL 3 TIMES DAILY PRN
Status: DISCONTINUED | OUTPATIENT
Start: 2025-06-08 | End: 2025-06-09 | Stop reason: HOSPADM

## 2025-06-08 RX ORDER — ASPIRIN 81 MG/1
81 TABLET ORAL DAILY
Status: DISCONTINUED | OUTPATIENT
Start: 2025-06-09 | End: 2025-06-09

## 2025-06-08 RX ORDER — HEPARIN SODIUM,PORCINE/D5W 25000/250
0-40 INTRAVENOUS SOLUTION INTRAVENOUS CONTINUOUS
Status: DISCONTINUED | OUTPATIENT
Start: 2025-06-08 | End: 2025-06-09

## 2025-06-08 RX ORDER — NITROGLYCERIN 0.4 MG/1
0.4 TABLET SUBLINGUAL EVERY 5 MIN PRN
Status: DISCONTINUED | OUTPATIENT
Start: 2025-06-08 | End: 2025-06-09 | Stop reason: HOSPADM

## 2025-06-08 RX ORDER — ASPIRIN 325 MG
325 TABLET, DELAYED RELEASE (ENTERIC COATED) ORAL DAILY
Status: DISCONTINUED | OUTPATIENT
Start: 2025-06-08 | End: 2025-06-08

## 2025-06-08 RX ADMIN — SEVELAMER CARBONATE 800 MG: 800 TABLET, FILM COATED ORAL at 11:06

## 2025-06-08 RX ADMIN — AMIODARONE HYDROCHLORIDE 200 MG: 200 TABLET ORAL at 08:06

## 2025-06-08 RX ADMIN — AZITHROMYCIN DIHYDRATE 500 MG: 250 TABLET ORAL at 09:06

## 2025-06-08 RX ADMIN — PIPERACILLIN SODIUM AND TAZOBACTAM SODIUM 4.5 G: 4; .5 INJECTION, POWDER, FOR SOLUTION INTRAVENOUS at 07:06

## 2025-06-08 RX ADMIN — GABAPENTIN 300 MG: 300 CAPSULE ORAL at 09:06

## 2025-06-08 RX ADMIN — SODIUM CHLORIDE 500 ML: 9 INJECTION, SOLUTION INTRAVENOUS at 12:06

## 2025-06-08 RX ADMIN — CLOPIDOGREL 75 MG: 75 TABLET ORAL at 08:06

## 2025-06-08 RX ADMIN — SEVELAMER CARBONATE 800 MG: 800 TABLET, FILM COATED ORAL at 08:06

## 2025-06-08 RX ADMIN — APIXABAN 5 MG: 5 TABLET, FILM COATED ORAL at 08:06

## 2025-06-08 RX ADMIN — ATORVASTATIN CALCIUM 80 MG: 40 TABLET, FILM COATED ORAL at 08:06

## 2025-06-08 RX ADMIN — PIPERACILLIN SODIUM AND TAZOBACTAM SODIUM 4.5 G: 4; .5 INJECTION, POWDER, FOR SOLUTION INTRAVENOUS at 06:06

## 2025-06-08 RX ADMIN — MIDODRINE HYDROCHLORIDE 5 MG: 5 TABLET ORAL at 03:06

## 2025-06-08 RX ADMIN — HEPARIN SODIUM AND DEXTROSE 12 UNITS/KG/HR: 10000; 5 INJECTION INTRAVENOUS at 12:06

## 2025-06-08 RX ADMIN — ASPIRIN 325 MG: 325 TABLET, COATED ORAL at 11:06

## 2025-06-08 RX ADMIN — MIDODRINE HYDROCHLORIDE 2.5 MG: 2.5 TABLET ORAL at 11:06

## 2025-06-08 RX ADMIN — FAMOTIDINE 20 MG: 20 TABLET, FILM COATED ORAL at 08:06

## 2025-06-08 RX ADMIN — SEVELAMER CARBONATE 800 MG: 800 TABLET, FILM COATED ORAL at 05:06

## 2025-06-08 NOTE — PLAN OF CARE
Problem: Adult Inpatient Plan of Care  Goal: Plan of Care Review  Outcome: Progressing  Goal: Patient-Specific Goal (Individualized)  Outcome: Progressing  Goal: Absence of Hospital-Acquired Illness or Injury  Outcome: Progressing  Goal: Optimal Comfort and Wellbeing  Outcome: Progressing  Goal: Readiness for Transition of Care  Outcome: Progressing     Problem: Hemodialysis  Goal: Safe, Effective Therapy Delivery  Outcome: Progressing  Goal: Effective Tissue Perfusion  Outcome: Progressing  Goal: Absence of Infection Signs and Symptoms  Outcome: Progressing     Problem: Sepsis/Septic Shock  Goal: Optimal Coping  Outcome: Progressing  Goal: Absence of Bleeding  Outcome: Progressing  Goal: Blood Glucose Level Within Targeted Range  Outcome: Progressing  Goal: Absence of Infection Signs and Symptoms  Outcome: Progressing  Goal: Optimal Nutrition Intake  Outcome: Progressing

## 2025-06-08 NOTE — PHARMACY MED REC
"Admission Medication History     The home medication history was taken by Sanjay Britton.    You may go to "Admission" then "Reconcile Home Medications" tabs to review and/or act upon these items.     The home medication list has been updated by the Pharmacy department.   Please read ALL comments highlighted in yellow.   Please address this information as you see fit.    Feel free to contact us if you have any questions or require assistance.      Medications listed below were obtained from: Spouse- Shea and [employer] Medications   Medication Sig    acetaminophen (TYLENOL) 500 MG tablet Take 1 tablet (500 mg total) by mouth every 6 (six) hours as needed for Pain.      albuterol (VENTOLIN HFA) 90 mcg/actuation inhaler Inhale 2 puffs into the lungs every 6 (six) hours as needed for Wheezing or Shortness of Breath. Rescue      albuterol-ipratropium (DUO-NEB) 2.5 mg-0.5 mg/3 mL nebulizer solution Take 3 mLs by nebulization every 4 (four) hours as needed for Wheezing. Rescue        amiodarone (PACERONE) 200 MG Tab TAKE 1 TABLET BY MOUTH EVERY DAY        atorvastatin (LIPITOR) 80 MG tablet Take 1 tablet (80 mg total) by mouth once daily.        b complex vitamins (B COMPLEX-VITAMIN B12) tablet Take 1 tablet by mouth once daily.          clopidogreL (PLAVIX) 75 mg tablet Take 1 tablet (75 mg total) by mouth once daily.        ELIQUIS 5 mg Tab TAKE 1 TABLET BY MOUTH TWICE A DAY      famotidine (PEPCID) 20 MG tablet Take 1 tablet (20 mg total) by mouth once daily. On dialysis days, please take this medicine after dialysis. Only taking on Tue, Thur, and  Sat.      fexofenadine HCl (ALLEGRA ORAL) Take 1 tablet by mouth daily as needed (allergies).        fluticasone furoate-vilanteroL (BREO) 100-25 mcg/dose diskus inhaler Inhale 1 puff into the lungs daily as needed (Asthma). Controller.         fluticasone propionate (FLONASE) 50 mcg/actuation nasal spray 1 spray (50 mcg total) by Each Nostril route " daily as needed for Allergies.        gabapentin (NEURONTIN) 300 MG capsule Take 300 mg by mouth nightly as needed (Nerve pain).        ipratropium (ATROVENT) 21 mcg (0.03 %) nasal spray 2 sprays by Each Nostril route daily as needed for Rhinitis.        methocarbamoL (ROBAXIN) 500 MG Tab Take 1 tablet (500 mg total) by mouth 4 (four) times daily.     (Patient not taking: Reported on 6/8/2025)      methylPREDNISolone (MEDROL DOSEPACK) 4 mg tablet use as directed     (Patient not taking: Reported on 6/8/2025)      midodrine (PROAMATINE) 2.5 MG Tab Take 1 tablet (2.5 mg total) by mouth 3 (three) times daily as needed (if bloos pressure less than 100/60 mmHg).      multivit-min-FA-lycopen-lutein (CENTRUM SILVER) 0.4 mg-300 mcg- 250 mcg Tab Take 1 tablet by mouth once daily.          nitroGLYCERIN (NITROSTAT) 0.4 MG SL tablet Place 1 tablet (0.4 mg total) under the tongue every 5 (five) minutes as needed for Chest pain.      omeprazole (PRILOSEC) 20 MG capsule Take 1 capsule (20 mg total) by mouth daily as needed (heartburn).      pulse oximeter (PULSE OXIMETER) device by Apply Externally route 2 (two) times a day. Use twice daily at 8 AM and 3 PM and record the value in MyChart as directed.      sevelamer carbonate (RENVELA) 800 mg Tab This medicine is used to treat your phosphorus level. Please take 1 tablet (800 mg) once a day with the largest meal of the day.     (Patient not taking: Reported on 6/8/2025)      torsemide (DEMADEX) 20 MG Tab Take 2 tablets (40 mg total) by mouth 3 (three) times a week.         Potential issues to be addressed PRIOR TO DISCHARGE  Patient reported not taking the following medications: (RENVELA, MEDROL DOSEPACK, ROBAXIN ). These medications remain on the home medication list. Please address accordingly.           Sanjay Britton  EXT [phone]                 .

## 2025-06-08 NOTE — PLAN OF CARE
Problem: Adult Inpatient Plan of Care  Goal: Plan of Care Review  Outcome: Ongoing  Goal: Patient-Specific Goal (Individualized)  Outcome: Ongoing  Goal: Absence of Hospital-Acquired Illness or Injury  Outcome: Ongoing  Goal: Optimal Comfort and Wellbeing  Outcome: Ongoing  Goal: Readiness for Transition of Care  Outcome: Ongoing     Problem: Hemodialysis  Goal: Safe, Effective Therapy Delivery  Outcome: Ongoing  Goal: Effective Tissue Perfusion  Outcome: Ongoing  Goal: Absence of Infection Signs and Symptoms  Outcome: Ongoing     Problem: Sepsis/Septic Shock  Goal: Optimal Coping  Outcome: Ongoing  Goal: Absence of Bleeding  Outcome: Ongoing  Goal: Blood Glucose Level Within Targeted Range  Outcome: Ongoing  Goal: Absence of Infection Signs and Symptoms  Outcome: Ongoing  Goal: Optimal Nutrition Intake  Outcome: Ongoing

## 2025-06-08 NOTE — ASSESSMENT & PLAN NOTE
Creatine stable for now. BMP reviewed- noted Estimated Creatinine Clearance: 8.4 mL/min (A) (based on SCr of 9 mg/dL (H)). according to latest data. Based on current GFR, CKD stage is end stage.  Patient is on HD T/T/Sat. Nephrology consulted    Plan:  - Monitor UOP and serial BMP and adjust therapy as needed.   - Renally dose meds.   - Avoid nephrotoxic medications and procedures.  - Continue sevelamer carbonate 800mg TID  - Continue Pepcid 20mg and Midodrine 10mg w/ dialysis  - Continue torsemide tablet 40 mg MWF

## 2025-06-08 NOTE — PROGRESS NOTES
[facility] Medicine  Progress Note    Patient Name: Ibrahima Phelps  MRN: [id number]  Patient Class: IP- Inpatient   Admission Date: 6/7/2025  Length of Stay: 1 days  Attending Physician: Beronica Joe*  Primary Care Provider: Anatoliy Colindres MD        Subjective     Principal Problem:Severe sepsis        HPI:  69-year-old male with a history of CAD s/p PCI 2006, PCI to RCA w/ 2 DIEGO 2/28/2024, paroxysmal Afib (on amiodarone and Eliquis), HFpEF, PAD, ESRD on HD TTS, COPD who presents with fevers, chills, generalized weakness and new congested cough; non-productive cough. Patient says that this feels like his previous episode of pneumonia last month. He Endorses that he has had pneumonia multiple times as a child and twice during his adulthood. Wife reports confusion and disorientation this morning. He receives dialysis Tu/Thurs/Sat; scheduled for dialysis this afternoon. Per EMS, on their arrival, pt's oral temperature was 105. Pt was given ~500 mL of LR en route + arrived w/ ice packs to neck. EMS reports patient's wife gave patient 1 g of Tylenol prior to their arrival.     Of note, patient was discharged on 5/21 and was treated for fevers, chills. Started on IV Rocephin and Azithromycin for for CAP coverage. Had troponin peak but low risk ACS per cardiology. He was discharged on PO antibiotics (azithromycin 500mg QD and cefpodoxime 200mg QD for PNA).    In the ED, patient was febrile (Tmax 102.9), hypotensive /46-51, HR 92-78. Labs were remarkable for leukocytosis (WBC 13.51), hgb 12.1, hct 38.3, plt 172, CO2 18, BUN 56, elevated Cr 10.5 (ESRD), hypocalcemic (Ca 7.4). POC lactate trended down from 2.3 to 2. Flu was negative. CXR showed consolidation of mid and upper right lung suggestive of right-sided pneumonia. EKG showed nonspecific ST and T wave abnormality, unchanged from last EKG. He was given 1 dose of vancomycin + cefepime. Patient was admitted to hospital medicine for  treatment of pneumonia.     Overview/Hospital Course:  No notes on file    Interval History: Patient had 2 episodes of palpitations and some chest discomfort this morning. Denies dyspnea or diaphoresis.     Review of Systems  Objective:     Vital Signs (Most Recent):  Temp: 99 °F (37.2 °C) (06/08/25 1126)  Pulse: 66 (06/08/25 1126)  Resp: 18 (06/08/25 1126)  BP: (!) 97/52 (06/08/25 1126)  SpO2: 97 % (06/08/25 1126) Vital Signs (24h Range):  Temp:  [97.5 °F (36.4 °C)-99 °F (37.2 °C)] 99 °F (37.2 °C)  Pulse:  [64-79] 66  Resp:  [16-18] 18  SpO2:  [97 %-100 %] 97 %  BP: ()/(36-63) 97/52     Weight: 90.7 kg (199 lb 15.3 oz)  Body mass index is 27.12 kg/m².    Intake/Output Summary (Last 24 hours) at 6/8/2025 1344  Last data filed at 6/7/2025 1815  Gross per 24 hour   Intake --   Output 1500 ml   Net -1500 ml         Physical Exam  Constitutional:       General: He is not in acute distress.     Appearance: Normal appearance. He is not ill-appearing, toxic-appearing or diaphoretic.   HENT:      Head: Normocephalic and atraumatic.   Cardiovascular:      Rate and Rhythm: Normal rate and regular rhythm.      Pulses: Normal pulses.      Heart sounds: Normal heart sounds. No murmur heard.     No friction rub. No gallop.   Pulmonary:      Effort: Pulmonary effort is normal. No respiratory distress.      Breath sounds: Examination of the right-upper field reveals decreased breath sounds and rales. Examination of the right-middle field reveals decreased breath sounds and rales. Decreased breath sounds and rales present. No wheezing.   Abdominal:      General: Abdomen is flat. There is no distension.      Palpations: Abdomen is soft.      Tenderness: There is no abdominal tenderness. There is no guarding.   Skin:     General: Skin is warm and dry.   Neurological:      General: No focal deficit present.      Mental Status: He is alert and oriented to person, place, and time. Mental status is at baseline.                Significant Labs: All pertinent labs within the past 24 hours have been reviewed.    Significant Imaging: I have reviewed all pertinent imaging results/findings within the past 24 hours.      Assessment & Plan  Severe sepsis  This patient does have evidence of infective focus  My overall impression is sepsis with Acute kidney injury and Acute respiratory failure.  Source: Respiratory Infection  Antibiotics given-   Antibiotics (72h ago, onward)      Start     Stop Route Frequency Ordered    06/07/25 2100  azithromycin tablet 500 mg         -- Oral Nightly 06/07/25 1508    06/07/25 1930  piperacillin-tazobactam (ZOSYN) 4.5 g in D5W 100 mL IVPB (MB+)         -- IV Every 12 hours (non-standard times) 06/07/25 1508          Latest lactate reviewed-  Recent Labs   Lab 06/07/25  1548 06/08/25  0041   LACTATE  --  1.1   POCLAC 2.0  --      Organ dysfunction indicated by Acute kidney injury and Acute respiratory failure    Fluid challenge Fluid Not Needed - Patient is not hypotensive and/or lactate is less than 4.0.     Post- resuscitation assessment Yes - I attest a sepsis perfusion exam was performed within 6 hours of sepsis, severe sepsis, or septic shock presentation, following fluid resuscitation.      Will Not start Pressors- Levophed for MAP of 65  Source control achieved by: antibiotics  Mixed hyperlipidemia  Continue home statin    Paroxysmal atrial fibrillation  Patient has paroxysmal (<7 days) atrial fibrillation. Patient is currently in sinus rhythm. [id number] Score: 2. The patients heart rate in the last 24 hours is as follows:  Pulse  Min: 64  Max: 79     Antiarrhythmics  amiodarone tablet 200 mg, Daily, Oral    Anticoagulants  heparin 25,000 units in dextrose 5% 250 mL (100 units/mL) infusion LOW INTENSITY nomogram - [facility], Continuous, Intravenous  heparin 25,000 units in dextrose 5% (100 units/ml) IV bolus from bag LOW INTENSITY nomogram - [facility], As needed (PRN), Intravenous  heparin 25,000 units in  dextrose 5% (100 units/ml) IV bolus from bag LOW INTENSITY nomogram - [facility], As needed (PRN), Intravenous    Plan  - Replete lytes with a goal of K>4, Mg >2  - Patient is anticoagulated, see medications listed above.  - Patient's afib is currently controlled      Polyneuropathy  Continue home gabapentin 300mg    ESRD (end stage renal disease) on dialysis  Creatine stable for now. BMP reviewed- noted Estimated Creatinine Clearance: 8.4 mL/min (A) (based on SCr of 9 mg/dL (H)). according to latest data. Based on current GFR, CKD stage is end stage.  Patient is on HD T/T/Sat. Nephrology consulted    Plan:  - Monitor UOP and serial BMP and adjust therapy as needed.   - Renally dose meds.   - Avoid nephrotoxic medications and procedures.  - Continue sevelamer carbonate 800mg TID  - Continue Pepcid 20mg and Midodrine 10mg w/ dialysis  - Continue torsemide tablet 40 mg MWF      Chronic obstructive pulmonary disease, unspecified  Patient's PFTs in 2022 were normal. Patient declines using home inhalers at this time.   Coronary artery disease involving native coronary artery of native heart without angina pectoris  Patient with known CAD s/p stent placement and CABG, which is controlled Will continue Plavix and Statin and monitor for S/Sx of angina/ACS. Continue to monitor on telemetry.   Community acquired bacterial pneumonia  Patient has a diagnosis of pneumonia. The cause of the pneumonia is suspected to be bacterial in etiology but organism is not known. The pneumonia is stable. The patient has the following signs/symptoms of pneumonia: cough and shortness of breath. The patient does have a current oxygen requirement and the patient does not have a home oxygen requirement. I have reviewed the pertinent imaging. The following cultures have been collected: Blood cultures and Sputum culture The culture results are listed below.     Current antimicrobial regimen consists of the antibiotics listed below. Will monitor patient  closely and continue current treatment plan unchanged.    Antibiotics (From admission, onward)      Start     Stop Route Frequency Ordered    06/07/25 2100  azithromycin tablet 500 mg         -- Oral Nightly 06/07/25 1508    06/07/25 1930  piperacillin-tazobactam (ZOSYN) 4.5 g in D5W 100 mL IVPB (MB+)         -- IV Every 12 hours (non-standard times) 06/07/25 1508            Microbiology Results (last 7 days)       Procedure Component Value Units Date/Time    MRSA Screen by PCR [[id number]]  (Normal) Collected: 06/07/25 1539    Order Status: Completed Specimen: Nasal Swab Updated: 06/07/25 2126     MRSA PCR Screen Not Detected    Blood culture x two cultures. Draw prior to antibiotics. [[id number]]  (Normal) Collected: 06/07/25 1127    Order Status: Completed Specimen: Blood from Peripheral, Antecubital, Right Updated: 06/07/25 2102     Blood Culture No Growth After 6 Hours    Blood culture x two cultures. Draw prior to antibiotics. [[id number]]  (Normal) Collected: 06/07/25 1132    Order Status: Completed Specimen: Blood from Peripheral, Lower Arm, Right Updated: 06/07/25 2102     Blood Culture No Growth After 6 Hours    Culture, Respiratory with Gram Stain [[id number]]     Order Status: Sent Specimen: Respiratory from Sputum     Influenza A & B by Molecular [[id number]]  (Normal) Collected: 06/07/25 1127    Order Status: Completed Specimen: Nasal Swab Updated: 06/07/25 1517     INFLUENZA A MOLECULAR Negative     INFLUENZA B MOLECULAR  Negative          NSTEMI (non-ST elevated myocardial infarction)  Patient with a history of CAD s/p PCI 2006, PCI to RCA w/ 2 DIEGO 2/28/2024. Now complaining of chest discomfort and palpitations. Trop peaked at 1297.     Plan:  -- trend trop Q6  -- stat ECG prn hemodynamic instability or new/worsening CP  -- ACS protocol w/ ASA, clopidogrel, statin, heparin gtt  -- SL NG 0.4 mg q5 mins prn CP  -- antihypertensives prn goal SBP < 180  -- replete lytes prn goal K > 4.0, Mg > 2.0  -- NPO  MN for possibility of cath    VTE Risk Mitigation (From admission, onward)           Ordered     heparin 25,000 units in dextrose 5% (100 units/ml) IV bolus from bag LOW INTENSITY nomogram - [facility]  As needed (PRN)        Question:  Heparin Infusion Adjustment (DO NOT MODIFY ANSWER)  Answer:  [email or web address]\epic\Images\Pharmacy\HeparinInfusions\heparin LOW INTENSITY nomogram for [facility] [id number]    06/08/25 1029     heparin 25,000 units in dextrose 5% (100 units/ml) IV bolus from bag LOW INTENSITY nomogram - [facility]  As needed (PRN)        Question:  Heparin Infusion Adjustment (DO NOT MODIFY ANSWER)  Answer:  [email or web address]\epic\Images\Pharmacy\HeparinInfusions\heparin LOW INTENSITY nomogram for [facility] [id number].pdf    06/08/25 1029     heparin 25,000 units in dextrose 5% 250 mL (100 units/mL) infusion LOW INTENSITY nomogram - [facility]  Continuous        Question:  Begin at (units/kg/hr)  Answer:  12    06/08/25 1029     IP VTE HIGH RISK PATIENT  Once         06/07/25 1515     Place sequential compression device  Until discontinued         06/07/25 1515                    Discharge Planning   RAMU: 6/10/2025     Code Status: Full Code   Medical Readiness for Discharge Date:   Discharge Plan A: Home                        Luis Enrique Sanchez DO  Department of Hospital Medicine   [facility]

## 2025-06-08 NOTE — ASSESSMENT & PLAN NOTE
This patient does have evidence of infective focus  My overall impression is sepsis with Acute kidney injury and Acute respiratory failure.  Source: Respiratory Infection  Antibiotics given-   Antibiotics (72h ago, onward)      Start     Stop Route Frequency Ordered    06/07/25 2100  azithromycin tablet 500 mg         -- Oral Nightly 06/07/25 1508    06/07/25 1930  piperacillin-tazobactam (ZOSYN) 4.5 g in D5W 100 mL IVPB (MB+)         -- IV Every 12 hours (non-standard times) 06/07/25 1508          Latest lactate reviewed-  Recent Labs   Lab 06/07/25  1548 06/08/25  0041   LACTATE  --  1.1   POCLAC 2.0  --      Organ dysfunction indicated by Acute kidney injury and Acute respiratory failure    Fluid challenge Fluid Not Needed - Patient is not hypotensive and/or lactate is less than 4.0.     Post- resuscitation assessment Yes - I attest a sepsis perfusion exam was performed within 6 hours of sepsis, severe sepsis, or septic shock presentation, following fluid resuscitation.      Will Not start Pressors- Levophed for MAP of 65  Source control achieved by: antibiotics

## 2025-06-08 NOTE — PROGRESS NOTES
Pharmacokinetic Assessment Follow Up: IV Vancomycin    Vancomycin Assessment/Plan:  Vancomycin random level resulted at 21.4 mcg/ml, above goal of 15-20 mcg/mL  ESRD on HD on TTS regimen- last HD on 6/7 @ 18:42 (nephro consulted)  Re-dose when the random level is less than 20 mcg/mL  Next level to be drawn on 6/10 with AM labs prior to HD-- or earlier if HD regimen changes     Drug levels (last 3 results):  Recent Labs   Lab Result Units 06/08/25  0502   Vancomycin Random ug/ml 21.4       Pharmacy will continue to follow and monitor vancomycin.    Please contact pharmacy at extension [phone] for questions regarding this assessment.    Thank you for the consult,   Melissa Broderick, Alisia       Patient brief summary:  Ibrahima Phelps is a 69 y.o. male initiated on antimicrobial therapy with IV Vancomycin for treatment of Pneumonia    Drug Allergies:   Review of patient's allergies indicates:   Allergen Reactions    Ace inhibitors Swelling    Iodine Itching    Verapamil Other (See Comments)     Other reaction(s): Unknown    Povidone-iodine Itching       Actual Body Weight:   90.7 kg    Renal Function:   Estimated Creatinine Clearance: 8.4 mL/min (A) (based on SCr of 9 mg/dL (H)).,     Dialysis Method (if applicable):  intermittent HD    CBC (last 72 hours):  Recent Labs   Lab Result Units 06/07/25  1127 06/08/25  0502   WBC K/uL 13.51* 18.59*   HGB gm/dL 12.1* 10.0*   HCT % 38.3* 31.9*   Platelet Count K/uL 172 152   Lymph % % 3.6* 7.7*   Mono % % 4.9 7.0   Eos % % 1.3 1.9   Basophil % % 0.1 0.2       Metabolic Panel (last 72 hours):  Recent Labs   Lab Result Units 06/07/25  1127 06/08/25  0502   Sodium mmol/L 138 137   Potassium mmol/L 4.9 4.7   Chloride mmol/L 104 103   CO2 mmol/L 18* 22*   Glucose mg/dL 71 54*   BUN mg/dL 56* 46*   Creatinine mg/dL 10.5* 9.0*   Albumin g/dL 3.1* 2.6*   Bilirubin Total mg/dL 0.3 0.5   ALP unit/L 136 79   AST unit/L 21 20   ALT unit/L 19 16   Magnesium  mg/dL 2.0 2.0   Phosphorus Level  mg/dL 5.8* 5.2*       Vancomycin Administrations:  vancomycin given in the last 96 hours                     vancomycin 2 g in 0.9% sodium chloride 500 mL IVPB (mg) 2,000 mg New Bag 06/07/25 1143                    Microbiologic Results:  Microbiology Results (last 7 days)       Procedure Component Value Units Date/Time    MRSA Screen by PCR [[id number]]  (Normal) Collected: 06/07/25 1539    Order Status: Completed Specimen: Nasal Swab Updated: 06/07/25 2126     MRSA PCR Screen Not Detected    Blood culture x two cultures. Draw prior to antibiotics. [[id number]]  (Normal) Collected: 06/07/25 1127    Order Status: Completed Specimen: Blood from Peripheral, Antecubital, Right Updated: 06/07/25 2102     Blood Culture No Growth After 6 Hours    Blood culture x two cultures. Draw prior to antibiotics. [[id number]]  (Normal) Collected: 06/07/25 1132    Order Status: Completed Specimen: Blood from Peripheral, Lower Arm, Right Updated: 06/07/25 2102     Blood Culture No Growth After 6 Hours    Culture, Respiratory with Gram Stain [[id number]]     Order Status: Sent Specimen: Respiratory from Sputum     Influenza A & B by Molecular [[id number]]  (Normal) Collected: 06/07/25 1127    Order Status: Completed Specimen: Nasal Swab Updated: 06/07/25 1517     INFLUENZA A MOLECULAR Negative     INFLUENZA B MOLECULAR  Negative

## 2025-06-08 NOTE — SUBJECTIVE & OBJECTIVE
Interval History: Patient had 2 episodes of palpitations and some chest discomfort this morning. Denies dyspnea or diaphoresis.     Review of Systems  Objective:     Vital Signs (Most Recent):  Temp: 99 °F (37.2 °C) (06/08/25 1126)  Pulse: 66 (06/08/25 1126)  Resp: 18 (06/08/25 1126)  BP: (!) 97/52 (06/08/25 1126)  SpO2: 97 % (06/08/25 1126) Vital Signs (24h Range):  Temp:  [97.5 °F (36.4 °C)-99 °F (37.2 °C)] 99 °F (37.2 °C)  Pulse:  [64-79] 66  Resp:  [16-18] 18  SpO2:  [97 %-100 %] 97 %  BP: ()/(36-63) 97/52     Weight: 90.7 kg (199 lb 15.3 oz)  Body mass index is 27.12 kg/m².    Intake/Output Summary (Last 24 hours) at 6/8/2025 1344  Last data filed at 6/7/2025 1815  Gross per 24 hour   Intake --   Output 1500 ml   Net -1500 ml         Physical Exam  Constitutional:       General: He is not in acute distress.     Appearance: Normal appearance. He is not ill-appearing, toxic-appearing or diaphoretic.   HENT:      Head: Normocephalic and atraumatic.   Cardiovascular:      Rate and Rhythm: Normal rate and regular rhythm.      Pulses: Normal pulses.      Heart sounds: Normal heart sounds. No murmur heard.     No friction rub. No gallop.   Pulmonary:      Effort: Pulmonary effort is normal. No respiratory distress.      Breath sounds: Examination of the right-upper field reveals decreased breath sounds and rales. Examination of the right-middle field reveals decreased breath sounds and rales. Decreased breath sounds and rales present. No wheezing.   Abdominal:      General: Abdomen is flat. There is no distension.      Palpations: Abdomen is soft.      Tenderness: There is no abdominal tenderness. There is no guarding.   Skin:     General: Skin is warm and dry.   Neurological:      General: No focal deficit present.      Mental Status: He is alert and oriented to person, place, and time. Mental status is at baseline.               Significant Labs: All pertinent labs within the past 24 hours have been  reviewed.    Significant Imaging: I have reviewed all pertinent imaging results/findings within the past 24 hours.

## 2025-06-08 NOTE — HOSPITAL COURSE
69-year-old male with a history of CAD s/p PCI 2006, PCI to RCA w/ 2 DIEGO 2/28/2024, paroxysmal Afib (on amiodarone and Eliquis), HFpEF, PAD, ESRD on HD TTS, COPD who presented with fevers, chills, generalized weakness, non-productive cough here for pneumonia. He was treated with IV vanc, zosyn and azithromycin. MRSA nares negative and vanc removed. Dialyzed tues/thurs/sat with midodrine. Troponin peaked at 1297. Started on ACS protocol (ASA, clopidogrel, statin, heparin gtt). EKG Nonspecific ST and T wave abnormality, unchanged. Echo ordered. Interventional cards consulted for ACS rule out. No concern at this time as the trop elevation was likely demand ischemia due to sepsis. ACS protocol removed. Can get a stress test for ischemic evaluation outpatient. He will also need an echo. Patient was discharged in stable condition with PCP f/u, pulmonology referral for PFTs and cardiology referral. He was prescribed course of Levaquin to cover CAP.

## 2025-06-08 NOTE — ASSESSMENT & PLAN NOTE
Patient with a history of CAD s/p PCI 2006, PCI to RCA w/ 2 DIGEO 2/28/2024. Now complaining of chest discomfort and palpitations. Trop peaked at 1297. IC consulted. No ACS concer    Plan:  -- Interventional cards consulted for ACS rule out. No concern at this time as the trop elevation was likely demand ischemia due to sepsis. ACS protocol removed.   -- Can get a stress test for ischemic evaluation  -- replete lytes prn goal K > 4.0, Mg > 2.0  -- F/u echo

## 2025-06-08 NOTE — CARE UPDATE
"RAPID RESPONSE NURSE PROACTIVE ROUNDING NOTE       Time of Visit: 0030    Patient Information:  Admit Date: 2025  LOS: 1  Code Status: Full Code   Date of Visit: 2025  : 1955  Age: 69 y.o.  Sex: male  Race: Black or   Bed: [address] [place] [address] A:   MRN: [id number]    Recent Clinical History:  ICU discharge this admission? No   PACU discharge (last 24 hours)? No   Received conscious sedation/general anesthesia (last 24 hours)? No  ED Visit (Last 24 hours)? Yes  On NIPPV (Last 24 hours)? No     Primary Team:  Attending Physician: Beronica Joe*  Primary Service: [facility]     SITUATION    Notification source: Epic patient alert.  Reason for alert: Hypotension  Alert category: Circulatory     BACKGROUND     Primary Reason for Admission: Severe sepsis    Patient has a past medical history of Atrial fibrillation, CAD (coronary artery disease), CHF (congestive heart failure), CKD (chronic kidney disease) stage 3, GFR 30-59 ml/min, CKD (chronic kidney disease) stage 5, GFR less than 15 ml/min, COVID-19, Diverticulosis, Hyperlipidemia, Hypertension, Keloid cicatrix, Metabolic bone disease, Other emphysema, Pericarditis, S/P kidney transplant, Thrombocytopenia, Thyroid disease, and Tobacco use.    Last Vitals:  Temp: 97.5 °F (36.4 °C) (2056)  Pulse: 68 (32)  Resp: 18 (2056)  BP: 93/46 (32)  SpO2: 98 % (32)    24 Hours Vitals Range:  Temp:  [97.5 °F (36.4 °C)-102.9 °F (39.4 °C)]   Pulse:  [64-92]   Resp:  [16-20]   BP: ()/(36-63)   SpO2:  [96 %-100 %]     Labs:  Recent Labs     25  1127   WBC 13.51*   HGB 12.1*   HCT 38.3*          Recent Labs     25  1127      K 4.9      CO2 18*   BUN 56*   CREATININE 10.5*   GLU 71   PHOS 5.8*   MG 2.0        No results for input(s): "PH", "PCO2", "PO2", "HCO3", "POCSATURATED", "BE" in the last 72 hours.     ASSESSMENT     Pt's BP 85/41 (59). Upon arrival to room, pt lying in hospital " bed, awake, alert and answering questions appropriately. Pt denies fatigue, SOB, light headedness or dizziness. HR 68. SpO2 98.     BP 93/46 (62) RUE, automatic  /58 (74) RUE, automatic     Pt received PRN 2.5mg Midodrine dose around 2300.      Physical Exam  Constitutional:       General: He is awake. He is not in acute distress.     Appearance: Normal appearance.   Cardiovascular:      Rate and Rhythm: Normal rate.   Pulmonary:      Effort: Pulmonary effort is normal. No tachypnea.   Neurological:      Mental Status: He is alert.   Psychiatric:         Behavior: Behavior is cooperative.         INTERVENTIONS    Patient evaluated for Cardiac problem. Staff concerns included hypotension. The following interventions were performed: No additional interventions needed at this time..    Time spent at the bedside: < 15 min    RECOMMENDATIONS    We Recommend: Continuous telemetry monitoring, Monitor for signs of hemodynamic instability, Ensure PRN medications for hypotension are available and administered as needed, and Notify Rapid Response of decline in patient status    Consider additional midodrine if MAP <65 again.     PROVIDER ESCALATION    Escalation Required:  No    Orders received and case discussed with NA.    Patient Disposition: Remain in room 622.    FOLLOW-UP    Bedside nurseChristy and charge RN Diomedes updated on plan of care. Instructed to contact Rapid Response Nurse, Anuradha Ng RN at [phone] for further questions or concerns.

## 2025-06-08 NOTE — ASSESSMENT & PLAN NOTE
Patient has a diagnosis of pneumonia. The cause of the pneumonia is suspected to be bacterial in etiology but organism is not known. The pneumonia is stable. The patient has the following signs/symptoms of pneumonia: cough and shortness of breath. The patient does have a current oxygen requirement and the patient does not have a home oxygen requirement. I have reviewed the pertinent imaging. The following cultures have been collected: Blood cultures and Sputum culture The culture results are listed below.     Current antimicrobial regimen consists of the antibiotics listed below. Will monitor patient closely and continue current treatment plan unchanged.    Antibiotics (From admission, onward)      Start     Stop Route Frequency Ordered    06/07/25 2100  azithromycin tablet 500 mg         -- Oral Nightly 06/07/25 1508    06/07/25 1930  piperacillin-tazobactam (ZOSYN) 4.5 g in D5W 100 mL IVPB (MB+)         -- IV Every 12 hours (non-standard times) 06/07/25 1508            Microbiology Results (last 7 days)       Procedure Component Value Units Date/Time    MRSA Screen by PCR [[id number]]  (Normal) Collected: 06/07/25 1539    Order Status: Completed Specimen: Nasal Swab Updated: 06/07/25 2126     MRSA PCR Screen Not Detected    Blood culture x two cultures. Draw prior to antibiotics. [[id number]]  (Normal) Collected: 06/07/25 1127    Order Status: Completed Specimen: Blood from Peripheral, Antecubital, Right Updated: 06/07/25 2102     Blood Culture No Growth After 6 Hours    Blood culture x two cultures. Draw prior to antibiotics. [[id number]]  (Normal) Collected: 06/07/25 1132    Order Status: Completed Specimen: Blood from Peripheral, Lower Arm, Right Updated: 06/07/25 2102     Blood Culture No Growth After 6 Hours    Culture, Respiratory with Gram Stain [[id number]]     Order Status: Sent Specimen: Respiratory from Sputum     Influenza A & B by Molecular [[id number]]  (Normal) Collected: 06/07/25 1127    Order  Status: Completed Specimen: Nasal Swab Updated: 06/07/25 1517     INFLUENZA A MOLECULAR Negative     INFLUENZA B MOLECULAR  Negative

## 2025-06-08 NOTE — PLAN OF CARE
[facility]  Initial Discharge Assessment       Primary Care Provider: Anatoliy Colindres MD    Admission Diagnosis: Screening for cardiovascular condition [Z13.6]  Chest pain [R07.9]  Severe sepsis [A41.9, R65.20]  Pneumonia of right upper lobe due to infectious organism [J18.9]  Sepsis without acute organ dysfunction, due to unspecified organism [A41.9]    Admission Date: 6/7/2025  Expected Discharge Date: 6/10/2025    Transition of Care Barriers: None    Payor: HU[employer] MANAGED MEDICARE / Plan: [employer] Pocket Change Card HMO PPO SPECIAL NEEDS / Product Type: Medicare Advantage /     Extended Emergency Contact Information  Primary Emergency Contact: Shea Phelps  Address: [address] [place] [address] [facility]  Home Phone: [phone]  Mobile Phone: [phone]  Relation: Spouse  Preferred language: English  Secondary Emergency Contact: Mary Becerra  Address: [address] [place] [address] [facility]  Home Phone: [phone]  Mobile Phone: [phone]  Relation: Daughter    Discharge Plan A: Home  Discharge Plan B: Home Health      [id number] - [place], [place] - [address]  [address]  [facility] [id number]  Phone: [phone] Fax: [phone]    [facility]  [address]  [facility] [address]  Phone: [phone] Fax: [phone]    [id number] [place], [place] - [address] AT [facility] [address]  [address]  [facility] [address]  Phone: [phone] Fax: [phone]      Initial Assessment (most recent)       Adult Discharge Assessment - 06/08/25 1116          Discharge Assessment    Assessment Type Discharge Planning Assessment     Confirmed/corrected address, phone number and insurance Yes     Confirmed Demographics Correct on Facesheet     Source of Information patient;family     Communicated RAMU with patient/caregiver Date not available/Unable  to determine     People in Home spouse     Name(s) of People in Home Shea [phone]     Facility Arrived From: home     Do you expect to return to your current living situation? Yes     Do you have help at home or someone to help you manage your care at home? Yes     Who are your caregiver(s) and their phone number(s)? Spouse     Prior to hospitilization cognitive status: Alert/Oriented     Current cognitive status: Alert/Oriented     Walking or Climbing Stairs Difficulty no     Dressing/Bathing Difficulty no     Home Accessibility wheelchair accessible     Home Layout Able to live on 1st floor     Equipment Currently Used at Home none     Readmission within 30 days? Yes     Patient currently being followed by outpatient case management? No     Do you currently have service(s) that help you manage your care at home? No     Do you take prescription medications? Yes     Do you have prescription coverage? Yes     Do you have any problems affording any of your prescribed medications? TBD     Is the patient taking medications as prescribed? yes     Who is going to help you get home at discharge? Spouse     How do you get to doctors appointments? car, drives self;family or friend will provide     Are you on dialysis? Yes     Dialysis Name and Scheduled days T/TH/Sat [facility] on [address] [place]     Do you take coumadin? No     Discharge Plan A Home     Discharge Plan B Home Health     DME Needed Upon Discharge  --   TBD    Discharge Plan discussed with: Spouse/sig other;Patient     Name(s) and Number(s) Shea     Transition of Care Barriers None        Financial Resource Strain    How hard is it for you to pay for the very basics like food, housing, medical care, and heating? Not very hard        Housing Stability    In the last 12 months, was there a time when you were not able to pay the mortgage or rent on time? No     At any time in the past 12 months, were you homeless or living in a shelter (including now)? No         Transportation Needs    In the past 12 months, has lack of transportation kept you from medical appointments or from getting medications? No     In the past 12 months, has lack of transportation kept you from meetings, work, or from getting things needed for daily living? No        Food Insecurity    Within the past 12 months, you worried that your food would run out before you got the money to buy more. Never true     Within the past 12 months, the food you bought just didn't last and you didn't have money to get more. Never true        Social Isolation    How often do you feel lonely or isolated from those around you?  Rarely        Alcohol Use    Q2: How many drinks containing alcohol do you have on a typical day when you are drinking? Patient does not drink        Utilities    In the past 12 months has the electric, gas, oil, or water company threatened to shut off services in your home? No        Health Literacy    How often do you need to have someone help you when you read instructions, pamphlets, or other written material from your doctor or pharmacy? Sometimes                          Discharge Plan A and Plan B have been determined by review of patient's clinical status, future medical and therapeutic needs, and coverage/benefits for post-acute care in coordination with multidisciplinary team members.     CM spoke with patient in Room at bedside. All information was verified on facesheet. Patient lives in a 1 story house with spouse, 1 step to get inside with no pets.   Patient states no assistance is needed. Patient can ambulate, drive, run errands, and complete ADL's independently. Patient does not use any DME or any in-home assistive equipment. Patient has not used Home Health previously.   Patient is on dialysis T/Th/Sat [facility]  use Coumadin as a blood thinner. Patient takes medication as prescribed and has resources for all prescriptive needs. Patient will have help from family  member upon discharge. Family will provide transportation home. All Questions and concerns addressed and whiteboard updated with CM contact information. Will continue to follow for course of hospitalization.       Maira Pizarro RN  Case Management  [phone]

## 2025-06-08 NOTE — ASSESSMENT & PLAN NOTE
Patient with a history of CAD s/p PCI 2006, PCI to RCA w/ 2 DIEGO 2/28/2024. Now complaining of chest discomfort and palpitations. Trop peaked at 1297.     Plan:  -- trend trop Q6  -- stat ECG prn hemodynamic instability or new/worsening CP  -- ACS protocol w/ ASA, clopidogrel, statin, heparin gtt  -- SL NG 0.4 mg q5 mins prn CP  -- antihypertensives prn goal SBP < 180  -- replete lytes prn goal K > 4.0, Mg > 2.0  -- NPO MN for possibility of cath

## 2025-06-08 NOTE — ASSESSMENT & PLAN NOTE
Patient has paroxysmal (<7 days) atrial fibrillation. Patient is currently in sinus rhythm. [id number] Score: 2. The patients heart rate in the last 24 hours is as follows:  Pulse  Min: 64  Max: 79     Antiarrhythmics  amiodarone tablet 200 mg, Daily, Oral    Anticoagulants  heparin 25,000 units in dextrose 5% 250 mL (100 units/mL) infusion LOW INTENSITY nomogram - [facility], Continuous, Intravenous  heparin 25,000 units in dextrose 5% (100 units/ml) IV bolus from bag LOW INTENSITY nomogram - [facility], As needed (PRN), Intravenous  heparin 25,000 units in dextrose 5% (100 units/ml) IV bolus from bag LOW INTENSITY nomogram - [facility], As needed (PRN), Intravenous    Plan  - Replete lytes with a goal of K>4, Mg >2  - Patient is anticoagulated, see medications listed above.  - Patient's afib is currently controlled

## 2025-06-09 VITALS
HEART RATE: 101 BPM | HEIGHT: 72 IN | OXYGEN SATURATION: 99 % | DIASTOLIC BLOOD PRESSURE: 75 MMHG | RESPIRATION RATE: 18 BRPM | TEMPERATURE: 98 F | BODY MASS INDEX: 27.08 KG/M2 | WEIGHT: 199.94 LBS | SYSTOLIC BLOOD PRESSURE: 110 MMHG

## 2025-06-09 LAB
ABSOLUTE EOSINOPHIL (OHS): 0.63 K/UL
ABSOLUTE MONOCYTE (OHS): 1.18 K/UL (ref 0.3–1)
ABSOLUTE NEUTROPHIL COUNT (OHS): 8.42 K/UL (ref 1.8–7.7)
ALBUMIN SERPL BCP-MCNC: 2.5 G/DL (ref 3.5–5.2)
ALP SERPL-CCNC: 107 UNIT/L (ref 40–150)
ALT SERPL W/O P-5'-P-CCNC: 17 UNIT/L (ref 10–44)
ANION GAP (OHS): 17 MMOL/L (ref 8–16)
APTT PPP: 56.6 SECONDS (ref 21–32)
AST SERPL-CCNC: 23 UNIT/L (ref 11–45)
BASOPHILS # BLD AUTO: 0.07 K/UL
BASOPHILS NFR BLD AUTO: 0.6 %
BILIRUB SERPL-MCNC: 0.4 MG/DL (ref 0.1–1)
BUN SERPL-MCNC: 65 MG/DL (ref 8–23)
CALCIUM SERPL-MCNC: 7.1 MG/DL (ref 8.7–10.5)
CHLORIDE SERPL-SCNC: 101 MMOL/L (ref 95–110)
CO2 SERPL-SCNC: 15 MMOL/L (ref 23–29)
CREAT SERPL-MCNC: 11.2 MG/DL (ref 0.5–1.4)
ERYTHROCYTE [DISTWIDTH] IN BLOOD BY AUTOMATED COUNT: 18.2 % (ref 11.5–14.5)
GFR SERPLBLD CREATININE-BSD FMLA CKD-EPI: 4 ML/MIN/1.73/M2
GLUCOSE SERPL-MCNC: 56 MG/DL (ref 70–110)
HCT VFR BLD AUTO: 35 % (ref 40–54)
HGB BLD-MCNC: 10.5 GM/DL (ref 14–18)
IMM GRANULOCYTES # BLD AUTO: 0.1 K/UL (ref 0–0.04)
IMM GRANULOCYTES NFR BLD AUTO: 0.8 % (ref 0–0.5)
LYMPHOCYTES # BLD AUTO: 1.81 K/UL (ref 1–4.8)
MAGNESIUM SERPL-MCNC: 2.1 MG/DL (ref 1.6–2.6)
MCH RBC QN AUTO: 26.7 PG (ref 27–31)
MCHC RBC AUTO-ENTMCNC: 30 G/DL (ref 32–36)
MCV RBC AUTO: 89 FL (ref 82–98)
NUCLEATED RBC (/100WBC) (OHS): 0 /100 WBC
OHS QRS DURATION: 102 MS
OHS QTC CALCULATION: 465 MS
PHOSPHATE SERPL-MCNC: 5.3 MG/DL (ref 2.7–4.5)
PLATELET # BLD AUTO: 125 K/UL (ref 150–450)
PMV BLD AUTO: 11.3 FL (ref 9.2–12.9)
POTASSIUM SERPL-SCNC: 5.3 MMOL/L (ref 3.5–5.1)
PROT SERPL-MCNC: 6.4 GM/DL (ref 6–8.4)
RBC # BLD AUTO: 3.93 M/UL (ref 4.6–6.2)
RELATIVE EOSINOPHIL (OHS): 5.2 %
RELATIVE LYMPHOCYTE (OHS): 14.8 % (ref 18–48)
RELATIVE MONOCYTE (OHS): 9.7 % (ref 4–15)
RELATIVE NEUTROPHIL (OHS): 68.9 % (ref 38–73)
SODIUM SERPL-SCNC: 133 MMOL/L (ref 136–145)
TROPONIN I SERPL HS-MCNC: 508 NG/L
WBC # BLD AUTO: 12.21 K/UL (ref 3.9–12.7)

## 2025-06-09 PROCEDURE — 36415 COLL VENOUS BLD VENIPUNCTURE: CPT | Mod: HCNC | Performed by: HOSPITALIST

## 2025-06-09 PROCEDURE — 36415 COLL VENOUS BLD VENIPUNCTURE: CPT | Mod: HCNC

## 2025-06-09 PROCEDURE — 90935 HEMODIALYSIS ONE EVALUATION: CPT | Mod: HCNC,,, | Performed by: NURSE PRACTITIONER

## 2025-06-09 PROCEDURE — 99233 SBSQ HOSP IP/OBS HIGH 50: CPT | Mod: HCNC,,, | Performed by: INTERNAL MEDICINE

## 2025-06-09 PROCEDURE — 84100 ASSAY OF PHOSPHORUS: CPT | Mod: HCNC

## 2025-06-09 PROCEDURE — 84484 ASSAY OF TROPONIN QUANT: CPT | Mod: HCNC

## 2025-06-09 PROCEDURE — 63600175 PHARM REV CODE 636 W HCPCS: Mod: HCNC

## 2025-06-09 PROCEDURE — 85025 COMPLETE CBC W/AUTO DIFF WBC: CPT | Mod: HCNC

## 2025-06-09 PROCEDURE — 25000003 PHARM REV CODE 250: Mod: HCNC

## 2025-06-09 PROCEDURE — 83735 ASSAY OF MAGNESIUM: CPT | Mod: HCNC

## 2025-06-09 PROCEDURE — 85730 THROMBOPLASTIN TIME PARTIAL: CPT | Mod: HCNC | Performed by: HOSPITALIST

## 2025-06-09 PROCEDURE — 80053 COMPREHEN METABOLIC PANEL: CPT | Mod: HCNC

## 2025-06-09 PROCEDURE — 90935 HEMODIALYSIS ONE EVALUATION: CPT | Mod: HCNC

## 2025-06-09 PROCEDURE — 5A1D70Z PERFORMANCE OF URINARY FILTRATION, INTERMITTENT, LESS THAN 6 HOURS PER DAY: ICD-10-PCS | Performed by: HOSPITALIST

## 2025-06-09 RX ORDER — LEVOFLOXACIN 750 MG/1
750 TABLET, FILM COATED ORAL DAILY
Qty: 1 TABLET | Refills: 0 | Status: SHIPPED | OUTPATIENT
Start: 2025-06-10 | End: 2025-06-11

## 2025-06-09 RX ORDER — AZITHROMYCIN 250 MG/1
500 TABLET, FILM COATED ORAL NIGHTLY
Status: DISCONTINUED | OUTPATIENT
Start: 2025-06-09 | End: 2025-06-09 | Stop reason: HOSPADM

## 2025-06-09 RX ORDER — AZITHROMYCIN 250 MG/1
500 TABLET, FILM COATED ORAL NIGHTLY
Status: DISCONTINUED | OUTPATIENT
Start: 2025-06-09 | End: 2025-06-09

## 2025-06-09 RX ORDER — MUPIROCIN 20 MG/G
OINTMENT TOPICAL 2 TIMES DAILY
Status: DISCONTINUED | OUTPATIENT
Start: 2025-06-09 | End: 2025-06-09 | Stop reason: HOSPADM

## 2025-06-09 RX ORDER — SODIUM CHLORIDE 9 MG/ML
INJECTION, SOLUTION INTRAVENOUS ONCE
Status: CANCELLED | OUTPATIENT
Start: 2025-06-10

## 2025-06-09 RX ORDER — LEVOFLOXACIN 500 MG/1
500 TABLET, FILM COATED ORAL
Status: DISCONTINUED | OUTPATIENT
Start: 2025-06-12 | End: 2025-06-09 | Stop reason: HOSPADM

## 2025-06-09 RX ORDER — MIDODRINE HYDROCHLORIDE 5 MG/1
5 TABLET ORAL 3 TIMES DAILY PRN
Qty: 90 TABLET | Refills: 0 | Status: SHIPPED | OUTPATIENT
Start: 2025-06-09 | End: 2025-07-09

## 2025-06-09 RX ORDER — SODIUM CHLORIDE 9 MG/ML
INJECTION, SOLUTION INTRAVENOUS ONCE
Status: CANCELLED | OUTPATIENT
Start: 2025-06-09 | End: 2025-06-09

## 2025-06-09 RX ORDER — LEVOFLOXACIN 500 MG/1
500 TABLET, FILM COATED ORAL DAILY
Qty: 1 TABLET | Refills: 0 | Status: SHIPPED | OUTPATIENT
Start: 2025-06-12 | End: 2025-06-18

## 2025-06-09 RX ADMIN — SODIUM ZIRCONIUM CYCLOSILICATE 5 G: 5 POWDER, FOR SUSPENSION ORAL at 12:06

## 2025-06-09 RX ADMIN — PIPERACILLIN SODIUM AND TAZOBACTAM SODIUM 4.5 G: 4; .5 INJECTION, POWDER, FOR SOLUTION INTRAVENOUS at 09:06

## 2025-06-09 RX ADMIN — CLOPIDOGREL 75 MG: 75 TABLET ORAL at 09:06

## 2025-06-09 RX ADMIN — ASPIRIN 81 MG: 81 TABLET, COATED ORAL at 09:06

## 2025-06-09 RX ADMIN — ATORVASTATIN CALCIUM 80 MG: 40 TABLET, FILM COATED ORAL at 09:06

## 2025-06-09 RX ADMIN — HEPARIN SODIUM AND DEXTROSE 12 UNITS/KG/HR: 10000; 5 INJECTION INTRAVENOUS at 06:06

## 2025-06-09 NOTE — ASSESSMENT & PLAN NOTE
This patient does have evidence of infective focus  My overall impression is sepsis with Acute kidney injury and Acute respiratory failure.  Source: Respiratory Infection  Antibiotics given-   Antibiotics (72h ago, onward)      Start     Stop Route Frequency Ordered    06/09/25 2100  mupirocin 2 % ointment  (DECOLONIZATION PROTOCOL ORDERS)         06/14/25 2059 Nasl 2 times daily 06/09/25 1257    06/09/25 2100  azithromycin tablet 500 mg         06/10/25 2059 Oral Nightly 06/09/25 1415    06/07/25 1930  piperacillin-tazobactam (ZOSYN) 4.5 g in D5W 100 mL IVPB (MB+)         -- IV Every 12 hours (non-standard times) 06/07/25 1508          Latest lactate reviewed-  Recent Labs   Lab 06/07/25  1548 06/08/25  0041   LACTATE  --  1.1   POCLAC 2.0  --      Organ dysfunction indicated by Acute kidney injury and Acute respiratory failure    Fluid challenge Fluid Not Needed - Patient is not hypotensive and/or lactate is less than 4.0.     Post- resuscitation assessment Yes - I attest a sepsis perfusion exam was performed within 6 hours of sepsis, severe sepsis, or septic shock presentation, following fluid resuscitation.      Will Not start Pressors- Levophed for MAP of 65  Source control achieved by: antibiotics

## 2025-06-09 NOTE — DISCHARGE INSTRUCTIONS
Our goal at [facility] is to always give you outstanding care and exceptional service. You may receive a survey from [employer] by mail, text or e-mail in the next 24-48 hours asking about the care you received with us. The survey should only take 5-10 minutes to complete and is very important to us.     Your feedback provides us with a way to recognize our staff who work tirelessly to provide the best care! Also, your responses help us learn how to improve when your experience was below our aspiration of excellence. We are always looking for ways to improve your stay. We WILL use your feedback to continue making improvements to help us provide the highest quality care. We keep your personal information and feedback confidential. We appreciate your time completing this survey and can't wait to hear from you!!!    We look forward to your continued care with us! Thanks so much for choosing [facility] for your healthcare needs!\

## 2025-06-09 NOTE — ASSESSMENT & PLAN NOTE
Patient has paroxysmal (<7 days) atrial fibrillation. Patient is currently in sinus rhythm. [id number] Score: 2. The patients heart rate in the last 24 hours is as follows:  Pulse  Min: 61  Max: 74     Antiarrhythmics  amiodarone tablet 200 mg, Daily, Oral    Anticoagulants  apixaban tablet 5 mg, 2 times daily, Oral    Plan  - Replete lytes with a goal of K>4, Mg >2  - Patient is anticoagulated, see medications listed above.  - Patient's afib is currently controlled

## 2025-06-09 NOTE — ASSESSMENT & PLAN NOTE
Patient has a diagnosis of pneumonia. The cause of the pneumonia is suspected to be bacterial in etiology but organism is not known. The pneumonia is stable. The patient has the following signs/symptoms of pneumonia: cough and shortness of breath. The patient does have a current oxygen requirement and the patient does not have a home oxygen requirement. I have reviewed the pertinent imaging. The following cultures have been collected: Blood cultures and Sputum culture The culture results are listed below.     Current antimicrobial regimen consists of the antibiotics listed below. Will monitor patient closely and continue current treatment plan unchanged.    Antibiotics (From admission, onward)      Start     Stop Route Frequency Ordered    06/09/25 2100  mupirocin 2 % ointment  (DECOLONIZATION PROTOCOL ORDERS)         06/14/25 2059 Nasl 2 times daily 06/09/25 1257    06/09/25 2100  azithromycin tablet 500 mg         06/10/25 2059 Oral Nightly 06/09/25 1415    06/07/25 1930  piperacillin-tazobactam (ZOSYN) 4.5 g in D5W 100 mL IVPB (MB+)         -- IV Every 12 hours (non-standard times) 06/07/25 1508            Microbiology Results (last 7 days)       Procedure Component Value Units Date/Time    Blood culture x two cultures. Draw prior to antibiotics. [[id number]]  (Normal) Collected: 06/07/25 1127    Order Status: Completed Specimen: Blood from Peripheral, Antecubital, Right Updated: 06/09/25 0300     Blood Culture No Growth After 36 Hours    Blood culture x two cultures. Draw prior to antibiotics. [[id number]]  (Normal) Collected: 06/07/25 1132    Order Status: Completed Specimen: Blood from Peripheral, Lower Arm, Right Updated: 06/09/25 0300     Blood Culture No Growth After 36 Hours    MRSA Screen by PCR [[id number]]  (Normal) Collected: 06/07/25 1539    Order Status: Completed Specimen: Nasal Swab Updated: 06/07/25 2126     MRSA PCR Screen Not Detected    Culture, Respiratory with Gram Stain [[id number]]      Order Status: Sent Specimen: Respiratory from Sputum     Influenza A & B by Molecular [[id number]]  (Normal) Collected: 06/07/25 1127    Order Status: Completed Specimen: Nasal Swab Updated: 06/07/25 1517     INFLUENZA A MOLECULAR Negative     INFLUENZA B MOLECULAR  Negative

## 2025-06-09 NOTE — PLAN OF CARE
Problem: Adult Inpatient Plan of Care  Goal: Plan of Care Review  Outcome: Progressing  Goal: Patient-Specific Goal (Individualized)  Outcome: Progressing  Goal: Absence of Hospital-Acquired Illness or Injury  Outcome: Progressing  Goal: Optimal Comfort and Wellbeing  Outcome: Progressing  Goal: Readiness for Transition of Care  Outcome: Progressing     Problem: Hemodialysis  Goal: Safe, Effective Therapy Delivery  Outcome: Progressing  Goal: Effective Tissue Perfusion  Outcome: Progressing  Goal: Absence of Infection Signs and Symptoms  Outcome: Progressing     Problem: Sepsis/Septic Shock  Goal: Optimal Coping  Outcome: Progressing  Goal: Absence of Bleeding  Outcome: Progressing  Goal: Blood Glucose Level Within Targeted Range  Outcome: Progressing  Goal: Absence of Infection Signs and Symptoms  Outcome: Progressing  Goal: Optimal Nutrition Intake  Outcome: Progressing     Problem: Fall Injury Risk  Goal: Absence of Fall and Fall-Related Injury  Outcome: Progressing

## 2025-06-09 NOTE — CARE UPDATE
Unit [facility] Care Support Interaction      I have reviewed the chart of Ibrahima Phelps who is hospitalized for Severe sepsis. The patient is currently located in the following unit: MSU        I have assisted the primary physician in management of the following:      MRSA Decolonization - Mupirocin ordered and CHG ordered    Kianna Rosen PA-C  Unit Based [facility]

## 2025-06-09 NOTE — SUBJECTIVE & OBJECTIVE
Interval History: NAEON. Patient feels well today. ACS protocol removed due to less concern per IC.     Review of Systems  Objective:     Vital Signs (Most Recent):  Temp: 98.1 °F (36.7 °C) (06/09/25 1201)  Pulse: 64 (06/09/25 1201)  Resp: 18 (06/09/25 1201)  BP: (!) 144/79 (06/09/25 1201)  SpO2: (!) 94 % (06/09/25 1201) Vital Signs (24h Range):  Temp:  [98.1 °F (36.7 °C)-98.7 °F (37.1 °C)] 98.1 °F (36.7 °C)  Pulse:  [61-74] 64  Resp:  [18-19] 18  SpO2:  [94 %-100 %] 94 %  BP: (100-144)/(49-79) 144/79     Weight: 90.7 kg (199 lb 15.3 oz)  Body mass index is 27.12 kg/m².    Intake/Output Summary (Last 24 hours) at 6/9/2025 1412  Last data filed at 6/8/2025 2242  Gross per 24 hour   Intake 100 ml   Output --   Net 100 ml         Physical Exam  Constitutional:       General: He is not in acute distress.     Appearance: Normal appearance. He is not ill-appearing, toxic-appearing or diaphoretic.   HENT:      Head: Normocephalic and atraumatic.   Cardiovascular:      Rate and Rhythm: Normal rate and regular rhythm.      Pulses: Normal pulses.      Heart sounds: Normal heart sounds. No murmur heard.     No friction rub. No gallop.   Pulmonary:      Effort: Pulmonary effort is normal. No respiratory distress.      Breath sounds: Examination of the right-lower field reveals rales. Examination of the left-lower field reveals rales. Rales present. No decreased breath sounds or wheezing.   Abdominal:      General: Abdomen is flat. There is no distension.      Palpations: Abdomen is soft.      Tenderness: There is no abdominal tenderness. There is no guarding.   Skin:     General: Skin is warm and dry.   Neurological:      General: No focal deficit present.      Mental Status: He is alert and oriented to person, place, and time. Mental status is at baseline.               Significant Labs: All pertinent labs within the past 24 hours have been reviewed.    Significant Imaging: I have reviewed all pertinent imaging  results/findings within the past 24 hours.

## 2025-06-09 NOTE — PROGRESS NOTES
[facility] Medicine  Progress Note    Patient Name: Ibrahima Phelps  MRN: [id number]  Patient Class: IP- Inpatient   Admission Date: 6/7/2025  Length of Stay: 2 days  Attending Physician: Beronica Joe*  Primary Care Provider: Anatoliy Colindres MD        Subjective     Principal Problem:Severe sepsis        HPI:  69-year-old male with a history of CAD s/p PCI 2006, PCI to RCA w/ 2 DIEGO 2/28/2024, paroxysmal Afib (on amiodarone and Eliquis), HFpEF, PAD, ESRD on HD TTS, COPD who presents with fevers, chills, generalized weakness and new congested cough; non-productive cough. Patient says that this feels like his previous episode of pneumonia last month. He Endorses that he has had pneumonia multiple times as a child and twice during his adulthood. Wife reports confusion and disorientation this morning. He receives dialysis Tu/Thurs/Sat; scheduled for dialysis this afternoon. Per EMS, on their arrival, pt's oral temperature was 105. Pt was given ~500 mL of LR en route + arrived w/ ice packs to neck. EMS reports patient's wife gave patient 1 g of Tylenol prior to their arrival.     Of note, patient was discharged on 5/21 and was treated for fevers, chills. Started on IV Rocephin and Azithromycin for for CAP coverage. Had troponin peak but low risk ACS per cardiology. He was discharged on PO antibiotics (azithromycin 500mg QD and cefpodoxime 200mg QD for PNA).    In the ED, patient was febrile (Tmax 102.9), hypotensive /46-51, HR 92-78. Labs were remarkable for leukocytosis (WBC 13.51), hgb 12.1, hct 38.3, plt 172, CO2 18, BUN 56, elevated Cr 10.5 (ESRD), hypocalcemic (Ca 7.4). POC lactate trended down from 2.3 to 2. Flu was negative. CXR showed consolidation of mid and upper right lung suggestive of right-sided pneumonia. EKG showed nonspecific ST and T wave abnormality, unchanged from last EKG. He was given 1 dose of vancomycin + cefepime. Patient was admitted to hospital medicine for  treatment of pneumonia.     Overview/Hospital Course:  69-year-old male with a history of CAD s/p PCI 2006, PCI to RCA w/ 2 DIEGO 2/28/2024, paroxysmal Afib (on amiodarone and Eliquis), HFpEF, PAD, ESRD on HD TTS, COPD who presented with fevers, chills, generalized weakness, non-productive cough here for pneumonia. He was treated with IV vanc, zosyn and azithromycin. MRSA nares negative and vanc removed. Dialyzed tues/thurs/sat with midodrine. Troponin peaked at 1297. Started on ACS protocol (ASA, clopidogrel, statin, heparin gtt). EKG Nonspecific ST and T wave abnormality, unchanged. Echo ordered. Interventional cards consulted for ACS rule out. No concern at this time as the trop elevation was likely demand ischemia due to sepsis. ACS protocol removed. Can get a stress test for ischemic evaluation.       Interval History: NAEON. Patient feels well today. ACS protocol removed due to less concern per IC.     Review of Systems  Objective:     Vital Signs (Most Recent):  Temp: 98.1 °F (36.7 °C) (06/09/25 1201)  Pulse: 64 (06/09/25 1201)  Resp: 18 (06/09/25 1201)  BP: (!) 144/79 (06/09/25 1201)  SpO2: (!) 94 % (06/09/25 1201) Vital Signs (24h Range):  Temp:  [98.1 °F (36.7 °C)-98.7 °F (37.1 °C)] 98.1 °F (36.7 °C)  Pulse:  [61-74] 64  Resp:  [18-19] 18  SpO2:  [94 %-100 %] 94 %  BP: (100-144)/(49-79) 144/79     Weight: 90.7 kg (199 lb 15.3 oz)  Body mass index is 27.12 kg/m².    Intake/Output Summary (Last 24 hours) at 6/9/2025 1412  Last data filed at 6/8/2025 2242  Gross per 24 hour   Intake 100 ml   Output --   Net 100 ml         Physical Exam  Constitutional:       General: He is not in acute distress.     Appearance: Normal appearance. He is not ill-appearing, toxic-appearing or diaphoretic.   HENT:      Head: Normocephalic and atraumatic.   Cardiovascular:      Rate and Rhythm: Normal rate and regular rhythm.      Pulses: Normal pulses.      Heart sounds: Normal heart sounds. No murmur heard.     No friction rub. No  gallop.   Pulmonary:      Effort: Pulmonary effort is normal. No respiratory distress.      Breath sounds: Examination of the right-lower field reveals rales. Examination of the left-lower field reveals rales. Rales present. No decreased breath sounds or wheezing.   Abdominal:      General: Abdomen is flat. There is no distension.      Palpations: Abdomen is soft.      Tenderness: There is no abdominal tenderness. There is no guarding.   Skin:     General: Skin is warm and dry.   Neurological:      General: No focal deficit present.      Mental Status: He is alert and oriented to person, place, and time. Mental status is at baseline.               Significant Labs: All pertinent labs within the past 24 hours have been reviewed.    Significant Imaging: I have reviewed all pertinent imaging results/findings within the past 24 hours.      Assessment & Plan  Severe sepsis  This patient does have evidence of infective focus  My overall impression is sepsis with Acute kidney injury and Acute respiratory failure.  Source: Respiratory Infection  Antibiotics given-   Antibiotics (72h ago, onward)      Start     Stop Route Frequency Ordered    06/09/25 2100  mupirocin 2 % ointment  (DECOLONIZATION PROTOCOL ORDERS)         06/14/25 2059 Nasl 2 times daily 06/09/25 1257    06/09/25 2100  azithromycin tablet 500 mg         06/10/25 2059 Oral Nightly 06/09/25 1415    06/07/25 1930  piperacillin-tazobactam (ZOSYN) 4.5 g in D5W 100 mL IVPB (MB+)         -- IV Every 12 hours (non-standard times) 06/07/25 1508          Latest lactate reviewed-  Recent Labs   Lab 06/07/25  1548 06/08/25  0041   LACTATE  --  1.1   POCLAC 2.0  --      Organ dysfunction indicated by Acute kidney injury and Acute respiratory failure    Fluid challenge Fluid Not Needed - Patient is not hypotensive and/or lactate is less than 4.0.     Post- resuscitation assessment Yes - I attest a sepsis perfusion exam was performed within 6 hours of sepsis, severe sepsis,  or septic shock presentation, following fluid resuscitation.      Will Not start Pressors- Levophed for MAP of 65  Source control achieved by: antibiotics  Mixed hyperlipidemia  Continue home statin    Paroxysmal atrial fibrillation  Patient has paroxysmal (<7 days) atrial fibrillation. Patient is currently in sinus rhythm. [id number] Score: 2. The patients heart rate in the last 24 hours is as follows:  Pulse  Min: 61  Max: 74     Antiarrhythmics  amiodarone tablet 200 mg, Daily, Oral    Anticoagulants  apixaban tablet 5 mg, 2 times daily, Oral    Plan  - Replete lytes with a goal of K>4, Mg >2  - Patient is anticoagulated, see medications listed above.  - Patient's afib is currently controlled      Polyneuropathy  Continue home gabapentin 300mg    ESRD (end stage renal disease) on dialysis  Creatine stable for now. BMP reviewed- noted Estimated Creatinine Clearance: 6.7 mL/min (A) (based on SCr of 11.2 mg/dL (H)). according to latest data. Based on current GFR, CKD stage is end stage.  Patient is on HD T/T/Sat. Nephrology consulted    Plan:  - Monitor UOP and serial BMP and adjust therapy as needed.   - Renally dose meds.   - Avoid nephrotoxic medications and procedures.  - Continue sevelamer carbonate 800mg TID  - Continue Pepcid 20mg and Midodrine 10mg w/ dialysis  - Continue torsemide tablet 40 mg MWF      Chronic obstructive pulmonary disease, unspecified  Patient's PFTs in 2022 were normal. Patient declines using home inhalers at this time.   Coronary artery disease involving native coronary artery of native heart without angina pectoris  Patient with known CAD s/p stent placement and CABG, which is controlled Will continue Plavix and Statin and monitor for S/Sx of angina/ACS. Continue to monitor on telemetry.   Community acquired bacterial pneumonia  Patient has a diagnosis of pneumonia. The cause of the pneumonia is suspected to be bacterial in etiology but organism is not known. The pneumonia is stable.  The patient has the following signs/symptoms of pneumonia: cough and shortness of breath. The patient does have a current oxygen requirement and the patient does not have a home oxygen requirement. I have reviewed the pertinent imaging. The following cultures have been collected: Blood cultures and Sputum culture The culture results are listed below.     Current antimicrobial regimen consists of the antibiotics listed below. Will monitor patient closely and continue current treatment plan unchanged.    Antibiotics (From admission, onward)      Start     Stop Route Frequency Ordered    06/09/25 2100  mupirocin 2 % ointment  (DECOLONIZATION PROTOCOL ORDERS)         06/14/25 2059 Nasl 2 times daily 06/09/25 1257    06/09/25 2100  azithromycin tablet 500 mg         06/10/25 2059 Oral Nightly 06/09/25 1415    06/07/25 1930  piperacillin-tazobactam (ZOSYN) 4.5 g in D5W 100 mL IVPB (MB+)         -- IV Every 12 hours (non-standard times) 06/07/25 1508            Microbiology Results (last 7 days)       Procedure Component Value Units Date/Time    Blood culture x two cultures. Draw prior to antibiotics. [[id number]]  (Normal) Collected: 06/07/25 1127    Order Status: Completed Specimen: Blood from Peripheral, Antecubital, Right Updated: 06/09/25 0300     Blood Culture No Growth After 36 Hours    Blood culture x two cultures. Draw prior to antibiotics. [[id number]]  (Normal) Collected: 06/07/25 1132    Order Status: Completed Specimen: Blood from Peripheral, Lower Arm, Right Updated: 06/09/25 0300     Blood Culture No Growth After 36 Hours    MRSA Screen by PCR [[id number]]  (Normal) Collected: 06/07/25 1539    Order Status: Completed Specimen: Nasal Swab Updated: 06/07/25 2126     MRSA PCR Screen Not Detected    Culture, Respiratory with Gram Stain [[id number]]     Order Status: Sent Specimen: Respiratory from Sputum     Influenza A & B by Molecular [[id number]]  (Normal) Collected: 06/07/25 1127    Order Status: Completed  Specimen: Nasal Swab Updated: 06/07/25 1517     INFLUENZA A MOLECULAR Negative     INFLUENZA B MOLECULAR  Negative          NSTEMI (non-ST elevated myocardial infarction)  Elevated troponin  Patient with a history of CAD s/p PCI 2006, PCI to RCA w/ 2 DIEGO 2/28/2024. Now complaining of chest discomfort and palpitations. Trop peaked at 1297. IC consulted. No ACS concer    Plan:  -- Interventional cards consulted for ACS rule out. No concern at this time as the trop elevation was likely demand ischemia due to sepsis. ACS protocol removed.   -- Can get a stress test for ischemic evaluation  -- replete lytes prn goal K > 4.0, Mg > 2.0  -- F/u echo          VTE Risk Mitigation (From admission, onward)           Ordered     apixaban tablet 5 mg  2 times daily         06/09/25 1337     IP VTE HIGH RISK PATIENT  Once         06/07/25 1515     Place sequential compression device  Until discontinued         06/07/25 1515                    Discharge Planning   RAMU: 6/9/2025     Code Status: Full Code   Medical Readiness for Discharge Date:   Discharge Plan A: Home, Home with family, Other ([facility]; TT@ 11:15a.m.)   Discharge Delays: None known at this time                    Luis Enrique Sanchez DO  Department of Hospital Medicine   [facility]

## 2025-06-09 NOTE — SUBJECTIVE & OBJECTIVE
"Past Medical History:   Diagnosis Date    Atrial fibrillation     CAD (coronary artery disease) 2006    MI in 2006    CHF (congestive heart failure) 2017    CKD (chronic kidney disease) stage 3, GFR 30-59 ml/min     Dr. Latif.  in transplanted kidney    CKD (chronic kidney disease) stage 5, GFR less than 15 ml/min 02/02/2024    COVID-19 02/07/2023    Diverticulosis     Hyperlipidemia     Hypertension     Keloid cicatrix     Metabolic bone disease     Other emphysema 10/01/2019    Pericarditis     S/P kidney transplant 1992    x2 (1992 & 2005),    Thrombocytopenia     Thyroid disease     Tobacco use 09/05/2014       Past Surgical History:   Procedure Laterality Date    AV FISTULA PLACEMENT Left 12/18/2023    Procedure: CREATION, AV FISTULA;  Surgeon: JUANI Melendez III, MD;  [facility];  Service: Vascular;  Laterality: Left;  LUE AVF creation vs AVG insertion    CARDIOVERSION  04/30/15    CHOLECYSTECTOMY      COLONOSCOPY  April 20, 2011    Diverticulosis, repeat recommended in 3 yrs., repeat colonoscopy 2014 revealed 2 polyps.  He should return in 5 years.    COLONOSCOPY N/A 3/13/2020    Procedure: COLONOSCOPY;  Surgeon: Chon Casper MD;  Location: [facility] (4TH FLR);  Service: Endoscopy;  Laterality: N/A;  ok to hold coumadin x5days-see telephone encounter 2/4/20-tb    COLONOSCOPY N/A 2/4/2021    Procedure: COLONOSCOPY;  Surgeon: Chon Casper MD;  Location: [facility] (4TH FLR);  Service: Endoscopy;  Laterality: N/A;  Eliquis - per Dr. GAVIN Blunt ok to hold x 2 days and "restarted asap"- ERW  last prep "poor", [id number] ordered/ Pt requests Dr. Casper  prep ins. mailed - COVID screening on 2/1/21 PCW- ERW    COLONOSCOPY N/A 3/3/2021    Procedure: COLONOSCOPY;  Surgeon: Chon Casper MD;  Location: [facility] (4TH FLR);  Service: Endoscopy;  Laterality: N/A;  Patient with his second poor bowel pre and has poor prep today.  If patient not intersted or can't get colonoscopy tomorrow will " need constipation bowel prep and will need to restart his Eliquis today.Thanks,Chon     per Dr Blunt-ok to hold Eliquis 2 days prior      COVID     COLONOSCOPY N/A 12/6/2024    Procedure: COLONOSCOPY;  Surgeon: Chon Casper MD;  Location: [facility] (2ND FLR);  Service: Endoscopy;  Laterality: N/A;  Plavix-Eliquis pending/ HD T-Th-Sat- lab ordered  2/25/24 ECHO PA 49  ref by / [facility] portal-RN  11/29-message to clearance nurse for plavix and eliquis hold-tb  ok to hold Eliquis 2 days per Dr Parmar  ok to hold Plavix 5 days per MEENA COLVINGT  12/2- p    CORONARY ANGIOGRAPHY N/A 2/28/2024    Procedure: ANGIOGRAM, CORONARY ARTERY;  Surgeon: Tylor Lincoln MD;  Location: [facility] CATH LAB;  Service: Cardiology;  Laterality: N/A;    CORONARY ANGIOPLASTY WITH STENT PLACEMENT  9/13/2006    FISTULOGRAM N/A 2/19/2024    Procedure: Fistulogram;  Surgeon: JUANI Melendez III, MD;  Location: [facility] CATH LAB;  Service: Peripheral Vascular;  Laterality: N/A;    FISTULOGRAM, WITH PTA Left 6/3/2024    Procedure: FISTULOGRAM, WITH PTA;  Surgeon: JUANI Melendez III, MD;  [facility];  Service: Vascular;  Laterality: Left;  mGy: 51.58  GyCm2: 6.8285  Fluoro time: 5.1 min  Contrast: 28 cc    FISTULOGRAM, WITH PTA Left 3/5/2025    Procedure: FISTULOGRAM, WITH PTA;  Surgeon: JUANI Melendez III, MD;  [facility];  Service: Vascular;  Laterality: Left;    IRRIGATION AND DEBRIDEMENT Right 8/30/2023    Procedure: IRRIGATION AND DEBRIDEMENT, RIGHT MIDDLE FINGER;  Surgeon: Martin Larsen MD;  [facility];  Service: Orthopedics;  Laterality: Right;    KIDNEY TRANSPLANT  2005    LEFT HEART CATHETERIZATION N/A 2/26/2024    Procedure: Left heart cath;  Surgeon: Tylor Lincoln MD;  Location: [facility] CATH LAB;  Service: Cardiology;  Laterality: N/A;    PARATHYROIDECTOMY      PERCUTANEOUS TRANSLUMINAL ANGIOPLASTY OF ARTERIOVENOUS FISTULA Left 2/19/2024    Procedure: PTA,  AV FISTULA;  Surgeon: JUANI Melendez III, MD;  Location: [facility] CATH LAB;  Service: Peripheral Vascular;  Laterality: Left;    STENT, DRUG ELUTING, SINGLE VESSEL, CORONARY N/A 2/28/2024    Procedure: Stent, Drug Eluting, Single Vessel, Coronary;  Surgeon: Tylor Lincoln MD;  Location: [facility] CATH LAB;  Service: Cardiology;  Laterality: N/A;    STENT, PERIPHERAL GRAFT Left 3/5/2025    Procedure: STENT, PERIPHERAL GRAFT;  Surgeon: JUANI Melendez III, MD;  Location: [facility] OR [facility] FLR;  Service: Vascular;  Laterality: Left;  mGy 63.17  Gycm2 8.3386  flouro 63.17 min  contrast 38 ml    TREATMENT OF CARDIAC ARRHYTHMIA N/A 3/28/2022    Procedure: CARDIOVERSION;  Surgeon: Migue Blunt MD;  Location: [facility] EP LAB;  Service: Cardiology;  Laterality: N/A;  AF, DCC, MAC, GP, 3 PREP*RODRIGO deferred pt 100% compliant*    VENOPLASTY  6/3/2024    Procedure: ANGIOPLASTY, VEIN;  Surgeon: JUANI Melendez III, MD;  [facility];  Service: Vascular;;  Left subclavian       Review of patient's allergies indicates:   Allergen Reactions    Ace inhibitors Swelling    Iodine Itching    Verapamil Other (See Comments)     Other reaction(s): Unknown    Povidone-iodine Itching       No current facility-administered medications on file prior to encounter.     Current Outpatient Medications on File Prior to Encounter   Medication Sig    acetaminophen (TYLENOL) 500 MG tablet Take 1 tablet (500 mg total) by mouth every 6 (six) hours as needed for Pain.    albuterol (VENTOLIN HFA) 90 mcg/actuation inhaler Inhale 2 puffs into the lungs every 6 (six) hours as needed for Wheezing or Shortness of Breath. Rescue    albuterol-ipratropium (DUO-NEB) 2.5 mg-0.5 mg/3 mL nebulizer solution Take 3 mLs by nebulization every 4 (four) hours as needed for Wheezing. Rescue    amiodarone (PACERONE) 200 MG Tab TAKE 1 TABLET BY MOUTH EVERY DAY    atorvastatin (LIPITOR) 80 MG tablet Take 1 tablet (80 mg total) by mouth once daily.    b complex vitamins (B  COMPLEX-VITAMIN B12) tablet Take 1 tablet by mouth once daily.    clopidogreL (PLAVIX) 75 mg tablet Take 1 tablet (75 mg total) by mouth once daily.    ELIQUIS 5 mg Tab TAKE 1 TABLET BY MOUTH TWICE A DAY    famotidine (PEPCID) 20 MG tablet Take 1 tablet (20 mg total) by mouth once daily. On dialysis days, please take this medicine after dialysis. Only taking on Tue, Thur, and  Sat.    fexofenadine HCl (ALLEGRA ORAL) Take 1 tablet by mouth daily as needed (allergies).    fluticasone furoate-vilanteroL (BREO) 100-25 mcg/dose diskus inhaler Inhale 1 puff into the lungs once daily. Controller. Use this inhaler every day. (Patient taking differently: Inhale 1 puff into the lungs daily as needed (Asthma). Controller. Use this inhaler every day.)    fluticasone propionate (FLONASE) 50 mcg/actuation nasal spray 1 spray (50 mcg total) by Each Nostril route daily as needed for Allergies.    gabapentin (NEURONTIN) 300 MG capsule Take 1 capsule (300 mg total) by mouth every evening. (Patient taking differently: Take 300 mg by mouth nightly as needed (Nerve pain).)    ipratropium (ATROVENT) 21 mcg (0.03 %) nasal spray 2 sprays by Each Nostril route 3 (three) times daily. (Patient taking differently: 2 sprays by Each Nostril route daily as needed for Rhinitis.)    midodrine (PROAMATINE) 2.5 MG Tab Take 1 tablet (2.5 mg total) by mouth 3 (three) times daily as needed (if bloos pressure less than 100/60 mmHg).    multivit-min-FA-lycopen-lutein (CENTRUM SILVER) 0.4 mg-300 mcg- 250 mcg Tab Take 1 tablet by mouth once daily.    nitroGLYCERIN (NITROSTAT) 0.4 MG SL tablet Place 1 tablet (0.4 mg total) under the tongue every 5 (five) minutes as needed for Chest pain.    omeprazole (PRILOSEC) 20 MG capsule Take 1 capsule (20 mg total) by mouth daily as needed (heartburn).    pulse oximeter (PULSE OXIMETER) device by Apply Externally route 2 (two) times a day. Use twice daily at 8 AM and 3 PM and record the value in MyChart as directed.     torsemide (DEMADEX) 20 MG Tab Take 2 tablets (40 mg total) by mouth 3 (three) times a week.    methocarbamoL (ROBAXIN) 500 MG Tab Take 1 tablet (500 mg total) by mouth 4 (four) times daily. (Patient not taking: Reported on 6/8/2025)    methylPREDNISolone (MEDROL DOSEPACK) 4 mg tablet use as directed (Patient not taking: Reported on 6/8/2025)    sevelamer carbonate (RENVELA) 800 mg Tab This medicine is used to treat your phosphorus level. Please take 1 tablet (800 mg) once a day with the largest meal of the day. 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     Patient is currently retired   Substance and Sexual Activity    Alcohol use: No    Drug use: No    Sexual activity: Yes     Partners: Female     Review of Systems   Constitutional: Positive for chills and fever. Negative for diaphoresis.   Cardiovascular:  Negative for chest pain, dyspnea on exertion, leg swelling, near-syncope, orthopnea, palpitations, paroxysmal nocturnal dyspnea and syncope.   Respiratory:  Positive for cough. Negative for shortness of breath.    Gastrointestinal:  Negative for abdominal pain, diarrhea, nausea and vomiting.   Neurological:  Negative for light-headedness.   Psychiatric/Behavioral:  Negative for altered mental status and substance abuse.      Objective:     Vital Signs (Most Recent):  Temp: 98.4 °F (36.9 °C) (06/09/25 0722)  Pulse:  66 (06/09/25 0722)  Resp: 18 (06/09/25 0722)  BP: 130/63 (06/09/25 0722)  SpO2: 100 % (06/09/25 0722) Vital Signs (24h Range):  Temp:  [98.2 °F (36.8 °C)-99 °F (37.2 °C)] 98.4 °F (36.9 °C)  Pulse:  [61-74] 66  Resp:  [18-19] 18  SpO2:  [94 %-100 %] 100 %  BP: ()/(48-68) 130/63     Weight: 90.7 kg (199 lb 15.3 oz)  Body mass index is 27.12 kg/m².    SpO2: 100 %         Intake/Output Summary (Last 24 hours) at 6/9/2025 1110  Last data filed at 6/8/2025 2242  Gross per 24 hour   Intake 100 ml   Output --   Net 100 ml       Lines/Drains/Airways       Peripheral Intravenous Line  Duration                  Hemodialysis AV Fistula Left upper arm -- days         Hemodialysis AV Fistula 02/19/24 0924 Left upper arm 476 days         Peripheral IV - Single Lumen 06/07/25 1136 18 G Right Wrist 1 day         Peripheral IV - Single Lumen 06/07/25 1156 20 G Right Upper Arm 1 day                     Physical Exam  Constitutional:       General: He is not in acute distress.     Appearance: Normal appearance. He is well-developed.   HENT:      Mouth/Throat:      Mouth: Mucous membranes are moist.      Pharynx: Oropharynx is clear.   Cardiovascular:      Rate and Rhythm: Normal rate and regular rhythm.      Heart sounds: Normal heart sounds. No murmur heard.  Pulmonary:      Effort: Pulmonary effort is normal. No respiratory distress.      Breath sounds: Normal breath sounds.   Abdominal:      Palpations: Abdomen is soft.      Tenderness: There is no abdominal tenderness. There is no guarding.   Musculoskeletal:         General: No swelling. Normal range of motion.      Right lower leg: No edema.      Left lower leg: No edema.   Skin:     General: Skin is warm and dry.   Neurological:      Mental Status: He is alert and oriented to person, place, and time.   Psychiatric:         Mood and Affect: Mood normal.         Behavior: Behavior normal.          Significant Labs: All pertinent lab results from the last 24 hours have been  reviewed.    Significant Imaging: Reviewed

## 2025-06-09 NOTE — NURSING
Pt to [facility] via WC for HD. Tx started via LUE AVF without issue. Order for UF 1L, pt requested more fluid to get closer to dry weight. Okay for 3L UF if BP okay per nephro.

## 2025-06-09 NOTE — ASSESSMENT & PLAN NOTE
"This patient does have evidence of infective focus  My overall impression is sepsis with Acute kidney injury and Acute respiratory failure.  Source: Respiratory Infection  Antibiotics given-   Antibiotics (72h ago, onward)      Start     Stop Route Frequency Ordered    06/12/25 0730  levoFLOXacin tablet 500 mg        Placed in "Followed by" Linked Group    06/14/25 0729 Oral Every 48 hours 06/09/25 1452    06/10/25 0730  levoFLOXacin tablet 750 mg        Placed in "Followed by" Linked Group    -- Oral Once 06/09/25 1452    06/09/25 2100  mupirocin 2 % ointment  (DECOLONIZATION PROTOCOL ORDERS)         06/14/25 2059 Nasl 2 times daily 06/09/25 1257    06/09/25 2100  azithromycin tablet 500 mg         06/10/25 2059 Oral Nightly 06/09/25 1510          Latest lactate reviewed-  Recent Labs   Lab 06/07/25  1548 06/08/25  0041   LACTATE  --  1.1   POCLAC 2.0  --      Organ dysfunction indicated by Acute kidney injury and Acute respiratory failure    Fluid challenge Fluid Not Needed - Patient is not hypotensive and/or lactate is less than 4.0.     Post- resuscitation assessment Yes - I attest a sepsis perfusion exam was performed within 6 hours of sepsis, severe sepsis, or septic shock presentation, following fluid resuscitation.      Will Not start Pressors- Levophed for MAP of 65  Source control achieved by: antibiotics  "

## 2025-06-09 NOTE — ASSESSMENT & PLAN NOTE
"Patient has a diagnosis of pneumonia. The cause of the pneumonia is suspected to be bacterial in etiology but organism is not known. The pneumonia is stable. The patient has the following signs/symptoms of pneumonia: cough and shortness of breath. The patient does have a current oxygen requirement and the patient does not have a home oxygen requirement. I have reviewed the pertinent imaging. The following cultures have been collected: Blood cultures and Sputum culture The culture results are listed below.     Current antimicrobial regimen consists of the antibiotics listed below. Will monitor patient closely and continue current treatment plan unchanged.    Antibiotics (From admission, onward)      Start     Stop Route Frequency Ordered    06/12/25 0730  levoFLOXacin tablet 500 mg        Placed in "Followed by" Linked Group    06/14/25 0729 Oral Every 48 hours 06/09/25 1452    06/10/25 0730  levoFLOXacin tablet 750 mg        Placed in "Followed by" Linked Group    -- Oral Once 06/09/25 1452    06/09/25 2100  mupirocin 2 % ointment  (DECOLONIZATION PROTOCOL ORDERS)         06/14/25 2059 Nasl 2 times daily 06/09/25 1257    06/09/25 2100  azithromycin tablet 500 mg         06/10/25 2059 Oral Nightly 06/09/25 1510            Microbiology Results (last 7 days)       Procedure Component Value Units Date/Time    Blood culture x two cultures. Draw prior to antibiotics. [[id number]]  (Normal) Collected: 06/07/25 1127    Order Status: Completed Specimen: Blood from Peripheral, Antecubital, Right Updated: 06/09/25 1501     Blood Culture No Growth After 48 Hours    Blood culture x two cultures. Draw prior to antibiotics. [[id number]]  (Normal) Collected: 06/07/25 1132    Order Status: Completed Specimen: Blood from Peripheral, Lower Arm, Right Updated: 06/09/25 1501     Blood Culture No Growth After 48 Hours    MRSA Screen by PCR [[id number]]  (Normal) Collected: 06/07/25 1539    Order Status: Completed Specimen: Nasal Swab " Updated: 06/07/25 2126     MRSA PCR Screen Not Detected    Culture, Respiratory with Gram Stain [[id number]]     Order Status: Sent Specimen: Respiratory from Sputum     Influenza A & B by Molecular [[id number]]  (Normal) Collected: 06/07/25 1127    Order Status: Completed Specimen: Nasal Swab Updated: 06/07/25 1517     INFLUENZA A MOLECULAR Negative     INFLUENZA B MOLECULAR  Negative

## 2025-06-09 NOTE — ASSESSMENT & PLAN NOTE
Creatine stable for now. BMP reviewed- noted Estimated Creatinine Clearance: 6.7 mL/min (A) (based on SCr of 11.2 mg/dL (H)). according to latest data. Based on current GFR, CKD stage is end stage.  Patient is on HD T/T/Sat. Nephrology consulted    Plan:  - Monitor UOP and serial BMP and adjust therapy as needed.   - Renally dose meds.   - Avoid nephrotoxic medications and procedures.  - Continue sevelamer carbonate 800mg TID  - Continue Pepcid 20mg and Midodrine 10mg w/ dialysis  - Continue torsemide tablet 40 mg MWF

## 2025-06-09 NOTE — ASSESSMENT & PLAN NOTE
Patient with a history of CAD s/p PCI 2006, PCI to RCA w/ 2 DIEGO 2/28/2024. Now complaining of chest discomfort and palpitations. Trop peaked at 1297. IC consulted. No ACS concer    Plan:  -- Interventional cards consulted for ACS rule out. No concern at this time as the trop elevation was likely demand ischemia due to sepsis. ACS protocol removed.   -- Can get a stress test for ischemic evaluation  -- replete lytes prn goal K > 4.0, Mg > 2.0  -- F/u echo

## 2025-06-09 NOTE — ASSESSMENT & PLAN NOTE
Patient has paroxysmal (<7 days) atrial fibrillation. Patient is currently in sinus rhythm. [id number] Score: 2. The patients heart rate in the last 24 hours is as follows:  Pulse  Min: 61  Max: 74     Antiarrhythmics  amiodarone tablet 200 mg, Daily, Oral    Anticoagulants  apixaban tablet 5 mg, 2 times daily, Oral    Plan  - Replete lytes with a goal of K>4, Mg >2  - Patient is anticoagulated, see medications listed above.  - Patient's afib is currently controlled

## 2025-06-09 NOTE — PROGRESS NOTES
[facility] NOTE     Patient currently on hemodialysis for removal of uremic toxins .     Patient seen and evaluated on hemodialysis, tolerating treatment, see HD flowsheet for vitals and assessments.      No Hypotension, chest pain, shortness of breath, cramping, nausea or vomiting.     Following patient for the management of ESRD    Target UF: 3 L as tolerated, keep MAP >65.  HD today due to significant metabolic derangements on AM CMP. HD again tomorrow per OP schedule.   Continue Torsemide   Continue binders  Hgb 10.5 - no EPO.   Pre HD weight 95.1 ( EDW 90 kg); UFR adjusted. HD again tomorrow.   Consider renal diet restrictions; please limit daily intake to 32 oz per day.   No lab stick/BP intake on access site  Continue to monitor intake and output, daily weights   Please avoid gadolinium, fleets, phos-based laxatives, NSAIDs  Will follow closely and continue dialysis treatments while in-patient    TRISHA Trevizo DNP, APRN, FNP-C  Department of Nephrology  [facility]  Pager: [phone]

## 2025-06-09 NOTE — DISCHARGE SUMMARY
[facility]  Discharge Summary      Patient Name: Ibrahima Phelps  MRN: [id number]  SHAVONNE: [id number]  Patient Class: IP- Inpatient  Admission Date: 6/7/2025  Hospital Length of Stay: 2 days  Discharge Date and Time: 06/09/2025 4:38 PM  Attending Physician: Beronica Joe*   Discharging Provider: Luis Enrique Sanchez DO  Primary Care Provider: Anatoliy Colindres MD  [facility] Medicine Team: [facility] 3 Luis Enrique Sanchez DO  Primary Care Team: [facility] 3    HPI:   69-year-old male with a history of CAD s/p PCI 2006, PCI to RCA w/ 2 DIEGO 2/28/2024, paroxysmal Afib (on amiodarone and Eliquis), HFpEF, PAD, ESRD on HD TTS, COPD who presents with fevers, chills, generalized weakness and new congested cough; non-productive cough. Patient says that this feels like his previous episode of pneumonia last month. He Endorses that he has had pneumonia multiple times as a child and twice during his adulthood. Wife reports confusion and disorientation this morning. He receives dialysis Tu/Thurs/Sat; scheduled for dialysis this afternoon. Per EMS, on their arrival, pt's oral temperature was 105. Pt was given ~500 mL of LR en route + arrived w/ ice packs to neck. EMS reports patient's wife gave patient 1 g of Tylenol prior to their arrival.     Of note, patient was discharged on 5/21 and was treated for fevers, chills. Started on IV Rocephin and Azithromycin for for CAP coverage. Had troponin peak but low risk ACS per cardiology. He was discharged on PO antibiotics (azithromycin 500mg QD and cefpodoxime 200mg QD for PNA).    In the ED, patient was febrile (Tmax 102.9), hypotensive /46-51, HR 92-78. Labs were remarkable for leukocytosis (WBC 13.51), hgb 12.1, hct 38.3, plt 172, CO2 18, BUN 56, elevated Cr 10.5 (ESRD), hypocalcemic (Ca 7.4). POC lactate trended down from 2.3 to 2. Flu was negative. CXR showed consolidation of mid and upper right lung suggestive of right-sided pneumonia. EKG  showed nonspecific ST and T wave abnormality, unchanged from last EKG. He was given 1 dose of vancomycin + cefepime. Patient was admitted to hospital medicine for treatment of pneumonia.     * No surgery found *      Hospital Course:   69-year-old male with a history of CAD s/p PCI 2006, PCI to RCA w/ 2 DIEGO 2/28/2024, paroxysmal Afib (on amiodarone and Eliquis), HFpEF, PAD, ESRD on HD TTS, COPD who presented with fevers, chills, generalized weakness, non-productive cough here for pneumonia. He was treated with IV vanc, zosyn and azithromycin. MRSA nares negative and vanc removed. Dialyzed tues/thurs/sat with midodrine. Troponin peaked at 1297. Started on ACS protocol (ASA, clopidogrel, statin, heparin gtt). EKG Nonspecific ST and T wave abnormality, unchanged. Echo ordered. Interventional cards consulted for ACS rule out. No concern at this time as the trop elevation was likely demand ischemia due to sepsis. ACS protocol removed. Can get a stress test for ischemic evaluation outpatient. He will also need an echo. Patient was discharged in stable condition with PCP f/u, pulmonology referral for PFTs and cardiology referral. He was prescribed course of Levaquin to cover CAP.        Goals of Care Treatment Preferences:  Code Status: Full Code    Health care agent: Pt's wife is CRISTY  Health care agent number: No value filed.                   SDOH Screening:  The patient was screened for utility difficulties, food insecurity, transport difficulties, housing insecurity, and interpersonal safety and there were no concerns identified this admission.     Consults:   Consults (From admission, onward)          Status Ordering Provider     Inpatient consult to Interventional Cardiology  Once        Provider:  (Not yet assigned)    Completed ADRIAN MENDEZ     Inpatient consult to Nephrology  Once        Provider:  (Not yet assigned)    Completed SHANTE WALL            Assessment & Plan  Severe sepsis  This patient does  "have evidence of infective focus  My overall impression is sepsis with Acute kidney injury and Acute respiratory failure.  Source: Respiratory Infection  Antibiotics given-   Antibiotics (72h ago, onward)      Start     Stop Route Frequency Ordered    06/12/25 0730  levoFLOXacin tablet 500 mg        Placed in "Followed by" Linked Group    06/14/25 0729 Oral Every 48 hours 06/09/25 1452    06/10/25 0730  levoFLOXacin tablet 750 mg        Placed in "Followed by" Linked Group    -- Oral Once 06/09/25 1452    06/09/25 2100  mupirocin 2 % ointment  (DECOLONIZATION PROTOCOL ORDERS)         06/14/25 2059 Nasl 2 times daily 06/09/25 1257    06/09/25 2100  azithromycin tablet 500 mg         06/10/25 2059 Oral Nightly 06/09/25 1510          Latest lactate reviewed-  Recent Labs   Lab 06/07/25  1548 06/08/25  0041   LACTATE  --  1.1   POCLAC 2.0  --      Organ dysfunction indicated by Acute kidney injury and Acute respiratory failure    Fluid challenge Fluid Not Needed - Patient is not hypotensive and/or lactate is less than 4.0.     Post- resuscitation assessment Yes - I attest a sepsis perfusion exam was performed within 6 hours of sepsis, severe sepsis, or septic shock presentation, following fluid resuscitation.      Will Not start Pressors- Levophed for MAP of 65  Source control achieved by: antibiotics  Mixed hyperlipidemia  Continue home statin    Paroxysmal atrial fibrillation  Patient has paroxysmal (<7 days) atrial fibrillation. Patient is currently in sinus rhythm. [id number] Score: 2. The patients heart rate in the last 24 hours is as follows:  Pulse  Min: 61  Max: 74     Antiarrhythmics  amiodarone tablet 200 mg, Daily, Oral    Anticoagulants  apixaban tablet 5 mg, 2 times daily, Oral    Plan  - Replete lytes with a goal of K>4, Mg >2  - Patient is anticoagulated, see medications listed above.  - Patient's afib is currently controlled      Polyneuropathy  Continue home gabapentin 300mg    ESRD (end stage renal " "disease) on dialysis  Creatine stable for now. BMP reviewed- noted Estimated Creatinine Clearance: 6.7 mL/min (A) (based on SCr of 11.2 mg/dL (H)). according to latest data. Based on current GFR, CKD stage is end stage.  Patient is on HD T/T/Sat. Nephrology consulted    Plan:  - Monitor UOP and serial BMP and adjust therapy as needed.   - Renally dose meds.   - Avoid nephrotoxic medications and procedures.  - Continue sevelamer carbonate 800mg TID  - Continue Pepcid 20mg and Midodrine 10mg w/ dialysis  - Continue torsemide tablet 40 mg MWF      Chronic obstructive pulmonary disease, unspecified  Patient's PFTs in 2022 were normal. Patient declines using home inhalers at this time.   Coronary artery disease involving native coronary artery of native heart without angina pectoris  Patient with known CAD s/p stent placement and CABG, which is controlled Will continue Plavix and Statin and monitor for S/Sx of angina/ACS. Continue to monitor on telemetry.   Community acquired bacterial pneumonia  Patient has a diagnosis of pneumonia. The cause of the pneumonia is suspected to be bacterial in etiology but organism is not known. The pneumonia is stable. The patient has the following signs/symptoms of pneumonia: cough and shortness of breath. The patient does have a current oxygen requirement and the patient does not have a home oxygen requirement. I have reviewed the pertinent imaging. The following cultures have been collected: Blood cultures and Sputum culture The culture results are listed below.     Current antimicrobial regimen consists of the antibiotics listed below. Will monitor patient closely and continue current treatment plan unchanged.    Antibiotics (From admission, onward)      Start     Stop Route Frequency Ordered    06/12/25 0730  levoFLOXacin tablet 500 mg        Placed in "Followed by" Linked Group    06/14/25 0729 Oral Every 48 hours 06/09/25 1452    06/10/25 0730  levoFLOXacin tablet 750 mg      " "  Placed in "Followed by" Linked Group    -- Oral Once 06/09/25 1452    06/09/25 2100  mupirocin 2 % ointment  (DECOLONIZATION PROTOCOL ORDERS)         06/14/25 2059 Nasl 2 times daily 06/09/25 1257    06/09/25 2100  azithromycin tablet 500 mg         06/10/25 2059 Oral Nightly 06/09/25 1510            Microbiology Results (last 7 days)       Procedure Component Value Units Date/Time    Blood culture x two cultures. Draw prior to antibiotics. [[id number]]  (Normal) Collected: 06/07/25 1127    Order Status: Completed Specimen: Blood from Peripheral, Antecubital, Right Updated: 06/09/25 1501     Blood Culture No Growth After 48 Hours    Blood culture x two cultures. Draw prior to antibiotics. [[id number]]  (Normal) Collected: 06/07/25 1132    Order Status: Completed Specimen: Blood from Peripheral, Lower Arm, Right Updated: 06/09/25 1501     Blood Culture No Growth After 48 Hours    MRSA Screen by PCR [[id number]]  (Normal) Collected: 06/07/25 1539    Order Status: Completed Specimen: Nasal Swab Updated: 06/07/25 2126     MRSA PCR Screen Not Detected    Culture, Respiratory with Gram Stain [[id number]]     Order Status: Sent Specimen: Respiratory from Sputum     Influenza A & B by Molecular [[id number]]  (Normal) Collected: 06/07/25 1127    Order Status: Completed Specimen: Nasal Swab Updated: 06/07/25 1517     INFLUENZA A MOLECULAR Negative     INFLUENZA B MOLECULAR  Negative          NSTEMI (non-ST elevated myocardial infarction)  Elevated troponin  Patient with a history of CAD s/p PCI 2006, PCI to RCA w/ 2 DIEGO 2/28/2024. Now complaining of chest discomfort and palpitations. Trop peaked at 1297. IC consulted. No ACS concer    Plan:  -- Interventional cards consulted for ACS rule out. No concern at this time as the trop elevation was likely demand ischemia due to sepsis. ACS protocol removed.   -- Can get a stress test for ischemic evaluation  -- replete lytes prn goal K > 4.0, Mg > 2.0  -- F/u echo        Final " Active Diagnoses:    Diagnosis Date Noted POA    PRINCIPAL PROBLEM:  Severe sepsis [A41.9, R65.20] 06/07/2025 Yes    Community acquired bacterial pneumonia [J15.9] 05/20/2025 Yes    Coronary artery disease involving native coronary artery of native heart without angina pectoris [I25.10] 05/09/2025 Yes     Chronic    Chronic obstructive pulmonary disease, unspecified [J44.9] 02/28/2024 Yes     Chronic    ESRD (end stage renal disease) on dialysis [N18.6, Z99.2] 02/02/2024 Not Applicable     Chronic    NSTEMI (non-ST elevated myocardial infarction) [I21.4] 11/24/2023 Yes    Polyneuropathy [G62.9] 09/16/2021 Yes     Chronic    Paroxysmal atrial fibrillation [I48.0] 09/25/2017 Yes     Chronic    Elevated troponin [R79.89] 01/22/2017 Yes    Secondary renal hyperparathyroidism [N25.81] 05/19/2014 Yes     Chronic    Mixed hyperlipidemia [E78.2]  Yes     Chronic      Problems Resolved During this Admission:       Discharged Condition: stable    Disposition: Home or Self Care    Follow Up:    Patient Instructions:      Ambulatory referral/consult to Internal Medicine   Standing Status: Future   Referral Priority: Routine Referral Type: Consultation   Referral Reason: Specialty Services Required   Requested Specialty: Internal Medicine   Number of Visits Requested: 1     Ambulatory referral/consult to Pulmonology   Standing Status: Future   Referral Priority: Routine Referral Type: Consultation   Referral Reason: Specialty Services Required   Requested Specialty: Pulmonary Disease   Number of Visits Requested: 1     Ambulatory referral/consult to Cardiology   Standing Status: Future   Referral Priority: Routine Referral Type: Consultation   Referral Reason: Specialty Services Required   Requested Specialty: Cardiology   Number of Visits Requested: 1       Significant Diagnostic Studies: Labs: CMP   Recent Labs   Lab 06/08/25  0502 06/09/25  0511    133*   K 4.7 5.3*    101   CO2 22* 15*   GLU 54* 56*   BUN 46* 65*    CREATININE 9.0* 11.2*   CALCIUM 7.3* 7.1*   PROT 5.8* 6.4   ALBUMIN 2.6* 2.5*   BILITOT 0.5 0.4   ALKPHOS 79 107   AST 20 23   ALT 16 17   ANIONGAP 12 17*    and CBC   Recent Labs   Lab 06/08/25  0502 06/08/25  1044 06/09/25  0511   WBC 18.59* 15.89* 12.21   HGB 10.0* 9.4* 10.5*   HCT 31.9* 30.3* 35.0*    153 125*       Pending Diagnostic Studies:       Procedure Component Value Units Date/Time    Urinalysis, Reflex to Urine Culture Urine, Clean Catch [[id number]]     Order Status: Sent Lab Status: No result     Specimen: Urine            Medications:  Reconciled Home Medications:      Medication List        START taking these medications      levoFLOXacin 750 MG tablet  Commonly known as: LEVAQUIN  ON 6/10/25, Take 1 tablet (750 mg total) by mouth once daily. for 1 day  Start taking on: Tabby 10, 2025            CHANGE how you take these medications      midodrine 5 MG Tab  Commonly known as: PROAMATINE  Take 1 tablet (5 mg total) by mouth 3 (three) times daily as needed (if blood pressure less than 100/60 mmHg).  What changed:   medication strength  how much to take            CONTINUE taking these medications      acetaminophen 500 MG tablet  Commonly known as: TYLENOL  Take 1 tablet (500 mg total) by mouth every 6 (six) hours as needed for Pain.     albuterol 90 mcg/actuation inhaler  Commonly known as: VENTOLIN HFA  Inhale 2 puffs into the lungs every 6 (six) hours as needed for Wheezing or Shortness of Breath. Rescue     albuterol-ipratropium 2.5 mg-0.5 mg/3 mL nebulizer solution  Commonly known as: DUO-NEB  Take 3 mLs by nebulization every 4 (four) hours as needed for Wheezing. Rescue     ALLEGRA ORAL  Take 1 tablet by mouth daily as needed (allergies).     amiodarone 200 MG Tab  Commonly known as: PACERONE  TAKE 1 TABLET BY MOUTH EVERY DAY     atorvastatin 80 MG tablet  Commonly known as: LIPITOR  Take 1 tablet (80 mg total) by mouth once daily.     b complex vitamins tablet  Commonly known as: B  COMPLEX-VITAMIN B12  Take 1 tablet by mouth once daily.     CENTRUM SILVER 0.4 mg-300 mcg- 250 mcg Tab  Generic drug: multivit-min-FA-lycopen-lutein  Take 1 tablet by mouth once daily.     clopidogreL 75 mg tablet  Commonly known as: PLAVIX  Take 1 tablet (75 mg total) by mouth once daily.     ELIQUIS 5 mg Tab  Generic drug: apixaban  TAKE 1 TABLET BY MOUTH TWICE A DAY     famotidine 20 MG tablet  Commonly known as: PEPCID  Take 1 tablet (20 mg total) by mouth once daily. On dialysis days, please take this medicine after dialysis. Only taking on Tue, Thur, and  Sat.     fluticasone furoate-vilanteroL 100-25 mcg/dose diskus inhaler  Commonly known as: BREO  Inhale 1 puff into the lungs once daily. Controller. Use this inhaler every day.     fluticasone propionate 50 mcg/actuation nasal spray  Commonly known as: FLONASE  1 spray (50 mcg total) by Each Nostril route daily as needed for Allergies.     gabapentin 300 MG capsule  Commonly known as: NEURONTIN  Take 1 capsule (300 mg total) by mouth every evening.     ipratropium 21 mcg (0.03 %) nasal spray  Commonly known as: ATROVENT  2 sprays by Each Nostril route 3 (three) times daily.     miscellaneous medical supply Kit  by Apply Externally route 2 (two) times a day. Use twice daily at 8 AM and 3 PM and record the value in MyChart as directed.     nitroGLYCERIN 0.4 MG SL tablet  Commonly known as: NITROSTAT  Place 1 tablet (0.4 mg total) under the tongue every 5 (five) minutes as needed for Chest pain.     omeprazole 20 MG capsule  Commonly known as: PRILOSEC  Take 1 capsule (20 mg total) by mouth daily as needed (heartburn).     torsemide 20 MG Tab  Commonly known as: DEMADEX  Take 2 tablets (40 mg total) by mouth 3 (three) times a week.            STOP taking these medications      methocarbamoL 500 MG Tab  Commonly known as: Robaxin     methylPREDNISolone 4 mg tablet  Commonly known as: MEDROL DOSEPACK            ASK your doctor about these medications       sevelamer carbonate 800 mg Tab  Commonly known as: RENVELA  This medicine is used to treat your phosphorus level. Please take 1 tablet (800 mg) once a day with the largest meal of the day.              Indwelling Lines/Drains at time of discharge:   Lines/Drains/Airways       Drain  Duration                  Hemodialysis AV Fistula Left upper arm -- days         Hemodialysis AV Fistula 02/19/24 0924 Left upper arm 476 days                    Time spent on the discharge of patient: 60 minutes         Luis Enrique Sanchez DO  Department of Hospital Medicine  [facility]

## 2025-06-09 NOTE — PROGRESS NOTES
Pharmacist Renal Dose Adjustment Note    Ibrahima Phelps is a 69 y.o. male being treated with the medication levofloxacin    Patient Data:    Vital Signs (Most Recent):  Temp: 98.1 °F (36.7 °C) (06/09/25 1201)  Pulse: 64 (06/09/25 1201)  Resp: 18 (06/09/25 1201)  BP: (!) 144/79 (06/09/25 1201)  SpO2: (!) 94 % (06/09/25 1201) Vital Signs (72h Range):  Temp:  [97.5 °F (36.4 °C)-102.9 °F (39.4 °C)]   Pulse:  [61-92]   Resp:  [16-20]   BP: ()/(36-79)   SpO2:  [94 %-100 %]      Recent Labs   Lab 06/07/25  1127 06/08/25  0502 06/09/25  0511   CREATININE 10.5* 9.0* 11.2*     Serum creatinine: 11.2 mg/dL (H) 06/09/25 0511  Estimated creatinine clearance: 6.7 mL/min (A)    Medication: levofloxacin 750 mg q24h x 3 days starting 6/10 will be changed to 750 mg po x1, followed by 500 mg q48h for 3 days starting 6/10 for ESRD on HD dosing    Pharmacist's Name: Amanda Chen  Pharmacist's Extension: [phone]

## 2025-06-09 NOTE — ASSESSMENT & PLAN NOTE
69yoF with a hx CAD s/p PCI 2006, subsequent PCI to RCA 2/2024, pAF on Eliquis, HFpEF, PAD, ESRD on HD TTS, COPD, who is here with fevers, chills, and admitted with PNA. He has a troponin elevation in the setting of sepsis/PNA. Interventional cardiology is consulted for evaluation.    hsTn elevated but downtrending  Pt without ischemic symptoms  EKG non-ischemic  Echo pending    This is most likely troponin elevation in the setting of demand ischemia due to sepsis. His presentation is not consistent with ACS.     Recommendations:  - No ACS protocol  - If echo shows newly reduced EF, can get a stress test for ischemic evaluation

## 2025-06-09 NOTE — CONSULTS
[facility] - [facility] Surg  Cardiology  Consult Note    Patient Name: Ibrahima Phelps  MRN: [id number]  Admission Date: 6/7/2025  Hospital Length of Stay: 2 days  Code Status: Full Code   Attending Provider: Beronica Joe*   Consulting Provider: Connor M Gillies, DO  Primary Care Physician: Anatoliy Colindres MD  Principal Problem:Severe sepsis    Patient information was obtained from patient, past medical records, and ER records.     Inpatient consult to Interventional Cardiology  Consult performed by: Gillies, Connor M, DO  Consult ordered by: Luis Enrique Sanchez DO        Subjective:     Chief Complaint:  Cough     HPI:   Ibrahima Phelps is a 69yoF with a hx CAD s/p PCI 2006, subsequent PCI to RCA 2/2024, pAF on Eliquis, HFpEF, PAD, ESRD on HD TTS, COPD, who is here with fevers, chills, and admitted with PNA. He has not reported any chest pain or any other symptoms besides his fevers, chills, and cough. When he had a heart attack in the past he had right and substernal chest pressure, which he has not had since then. hsTn was elevated at [id number], and EKG was non-ischemic. Echo is ordered for today. Interventional is consulted for elevated troponin in the setting of PNA/sepsis.     Past Medical History:   Diagnosis Date    Atrial fibrillation     CAD (coronary artery disease) 2006    MI in 2006    CHF (congestive heart failure) 2017    CKD (chronic kidney disease) stage 3, GFR 30-59 ml/min     Dr. Latif.  in transplanted kidney    CKD (chronic kidney disease) stage 5, GFR less than 15 ml/min 02/02/2024    COVID-19 02/07/2023    Diverticulosis     Hyperlipidemia     Hypertension     Keloid cicatrix     Metabolic bone disease     Other emphysema 10/01/2019    Pericarditis     S/P kidney transplant 1992    x2 (1992 & 2005),    Thrombocytopenia     Thyroid disease     Tobacco use 09/05/2014       Past Surgical History:   Procedure Laterality Date    AV FISTULA PLACEMENT Left 12/18/2023    Procedure: CREATION,  "AV FISTULA;  Surgeon: JUANI Melendez III, MD;  Location: [facility] OR 2ND FLR;  Service: Vascular;  Laterality: Left;  LUE AVF creation vs AVG insertion    CARDIOVERSION  04/30/15    CHOLECYSTECTOMY      COLONOSCOPY  April 20, 2011    Diverticulosis, repeat recommended in 3 yrs., repeat colonoscopy 2014 revealed 2 polyps.  He should return in 5 years.    COLONOSCOPY N/A 3/13/2020    Procedure: COLONOSCOPY;  Surgeon: Chon Casper MD;  Location: [facility] ENDO (4TH FLR);  Service: Endoscopy;  Laterality: N/A;  ok to hold coumadin x5days-see telephone encounter 2/4/20-tb    COLONOSCOPY N/A 2/4/2021    Procedure: COLONOSCOPY;  Surgeon: Chon Casper MD;  Location: [facility] (4TH FLR);  Service: Endoscopy;  Laterality: N/A;  Eliquis - per Dr. GAVIN Blunt ok to hold x 2 days and "restarted asap"- ERW  last prep "poor", [id number] ordered/ Pt requests Dr. Casper  prep ins. mailed - COVID screening on 2/1/21 PCW- ERW    COLONOSCOPY N/A 3/3/2021    Procedure: COLONOSCOPY;  Surgeon: Chon Casper MD;  Location: [facility] (4TH FLR);  Service: Endoscopy;  Laterality: N/A;  Patient with his second poor bowel pre and has poor prep today.  If patient not intersted or can't get colonoscopy tomorrow will need constipation bowel prep and will need to restart his Eliquis today.Thanks,Chon     per Dr Blunt-ok to hold Eliquis 2 days prior      COVID     COLONOSCOPY N/A 12/6/2024    Procedure: COLONOSCOPY;  Surgeon: Chon Casper MD;  Location: [facility] (2ND FLR);  Service: Endoscopy;  Laterality: N/A;  Plavix-Eliquis pending/ HD T-Th-Sat- lab ordered  2/25/24 ECHO PA 49  ref by / [facility] inst portal-RN  11/29-message to clearance nurse for plavix and eliquis hold-tb  ok to hold Eliquis 2 days per Dr Parmar  ok to hold Plavix 5 days per MEENA CUNNINGHAM-GT  12/2- p    CORONARY ANGIOGRAPHY N/A 2/28/2024    Procedure: ANGIOGRAM, CORONARY ARTERY;  Surgeon: Tylor Lincoln MD;  Location: [facility] CATH LAB;  Service: Cardiology;  " Laterality: N/A;    CORONARY ANGIOPLASTY WITH STENT PLACEMENT  9/13/2006    FISTULOGRAM N/A 2/19/2024    Procedure: Fistulogram;  Surgeon: JUANI Melendez III, MD;  Location: [facility] CATH LAB;  Service: Peripheral Vascular;  Laterality: N/A;    FISTULOGRAM, WITH PTA Left 6/3/2024    Procedure: FISTULOGRAM, WITH PTA;  Surgeon: JUANI Melendez III, MD;  [facility];  Service: Vascular;  Laterality: Left;  mGy: 51.58  GyCm2: 6.8285  Fluoro time: 5.1 min  Contrast: 28 cc    FISTULOGRAM, WITH PTA Left 3/5/2025    Procedure: FISTULOGRAM, WITH PTA;  Surgeon: JUANI Melendez III, MD;  Location: [facility];  Service: Vascular;  Laterality: Left;    IRRIGATION AND DEBRIDEMENT Right 8/30/2023    Procedure: IRRIGATION AND DEBRIDEMENT, RIGHT MIDDLE FINGER;  Surgeon: Martin Larsen MD;  Location: [address];  Service: Orthopedics;  Laterality: Right;    KIDNEY TRANSPLANT  2005    LEFT HEART CATHETERIZATION N/A 2/26/2024    Procedure: Left heart cath;  Surgeon: Tylor Lincoln MD;  Location: [facility] CATH LAB;  Service: Cardiology;  Laterality: N/A;    PARATHYROIDECTOMY      PERCUTANEOUS TRANSLUMINAL ANGIOPLASTY OF ARTERIOVENOUS FISTULA Left 2/19/2024    Procedure: PTA, AV FISTULA;  Surgeon: JUANI Melendez III, MD;  Location: [facility] CATH LAB;  Service: Peripheral Vascular;  Laterality: Left;    STENT, DRUG ELUTING, SINGLE VESSEL, CORONARY N/A 2/28/2024    Procedure: Stent, Drug Eluting, Single Vessel, Coronary;  Surgeon: Tylor Lincoln MD;  Location: [facility] CATH LAB;  Service: Cardiology;  Laterality: N/A;    STENT, PERIPHERAL GRAFT Left 3/5/2025    Procedure: STENT, PERIPHERAL GRAFT;  Surgeon: JUANI Melendez III, MD;  Location: [facility] OR [facility];  Service: Vascular;  Laterality: Left;  mGy 63.17  Gycm2 8.3386  flouro 63.17 min  contrast 38 ml    TREATMENT OF CARDIAC ARRHYTHMIA N/A 3/28/2022    Procedure: CARDIOVERSION;  Surgeon: Migue Blunt MD;  Location: [facility] EP LAB;  Service: Cardiology;   Laterality: N/A;  AF, DCC, MAC, GP, 3 PREP*RODRIGO deferred pt 100% compliant*    VENOPLASTY  6/3/2024    Procedure: ANGIOPLASTY, VEIN;  Surgeon: JUANI Melendez III, MD;  Location: [facility] OR [address] [place] [address];  Service: Vascular;;  Left subclavian       Review of patient's allergies indicates:   Allergen Reactions    Ace inhibitors Swelling    Iodine Itching    Verapamil Other (See Comments)     Other reaction(s): Unknown    Povidone-iodine Itching       No current facility-administered medications on file prior to encounter.     Current Outpatient Medications on File Prior to Encounter   Medication Sig    acetaminophen (TYLENOL) 500 MG tablet Take 1 tablet (500 mg total) by mouth every 6 (six) hours as needed for Pain.    albuterol (VENTOLIN HFA) 90 mcg/actuation inhaler Inhale 2 puffs into the lungs every 6 (six) hours as needed for Wheezing or Shortness of Breath. Rescue    albuterol-ipratropium (DUO-NEB) 2.5 mg-0.5 mg/3 mL nebulizer solution Take 3 mLs by nebulization every 4 (four) hours as needed for Wheezing. Rescue    amiodarone (PACERONE) 200 MG Tab TAKE 1 TABLET BY MOUTH EVERY DAY    atorvastatin (LIPITOR) 80 MG tablet Take 1 tablet (80 mg total) by mouth once daily.    b complex vitamins (B COMPLEX-VITAMIN B12) tablet Take 1 tablet by mouth once daily.    clopidogreL (PLAVIX) 75 mg tablet Take 1 tablet (75 mg total) by mouth once daily.    ELIQUIS 5 mg Tab TAKE 1 TABLET BY MOUTH TWICE A DAY    famotidine (PEPCID) 20 MG tablet Take 1 tablet (20 mg total) by mouth once daily. On dialysis days, please take this medicine after dialysis. Only taking on Tue, Thur, and  Sat.    fexofenadine HCl (ALLEGRA ORAL) Take 1 tablet by mouth daily as needed (allergies).    fluticasone furoate-vilanteroL (BREO) 100-25 mcg/dose diskus inhaler Inhale 1 puff into the lungs once daily. Controller. Use this inhaler every day. (Patient taking differently: Inhale 1 puff into the lungs daily as needed (Asthma). Controller. Use this inhaler  every day.)    fluticasone propionate (FLONASE) 50 mcg/actuation nasal spray 1 spray (50 mcg total) by Each Nostril route daily as needed for Allergies.    gabapentin (NEURONTIN) 300 MG capsule Take 1 capsule (300 mg total) by mouth every evening. (Patient taking differently: Take 300 mg by mouth nightly as needed (Nerve pain).)    ipratropium (ATROVENT) 21 mcg (0.03 %) nasal spray 2 sprays by Each Nostril route 3 (three) times daily. (Patient taking differently: 2 sprays by Each Nostril route daily as needed for Rhinitis.)    midodrine (PROAMATINE) 2.5 MG Tab Take 1 tablet (2.5 mg total) by mouth 3 (three) times daily as needed (if bloos pressure less than 100/60 mmHg).    multivit-min-FA-lycopen-lutein (CENTRUM SILVER) 0.4 mg-300 mcg- 250 mcg Tab Take 1 tablet by mouth once daily.    nitroGLYCERIN (NITROSTAT) 0.4 MG SL tablet Place 1 tablet (0.4 mg total) under the tongue every 5 (five) minutes as needed for Chest pain.    omeprazole (PRILOSEC) 20 MG capsule Take 1 capsule (20 mg total) by mouth daily as needed (heartburn).    pulse oximeter (PULSE OXIMETER) device by Apply Externally route 2 (two) times a day. Use twice daily at 8 AM and 3 PM and record the value in MyChart as directed.    torsemide (DEMADEX) 20 MG Tab Take 2 tablets (40 mg total) by mouth 3 (three) times a week.    methocarbamoL (ROBAXIN) 500 MG Tab Take 1 tablet (500 mg total) by mouth 4 (four) times daily. (Patient not taking: Reported on 6/8/2025)    methylPREDNISolone (MEDROL DOSEPACK) 4 mg tablet use as directed (Patient not taking: Reported on 6/8/2025)    sevelamer carbonate (RENVELA) 800 mg Tab This medicine is used to treat your phosphorus level. Please take 1 tablet (800 mg) once a day with the largest meal of the day. 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     Patient is currently retired   Substance and Sexual Activity    Alcohol use: No    Drug use: No    Sexual activity: Yes     Partners: Female     Review of Systems   Constitutional: Positive for chills and fever. Negative for diaphoresis.   Cardiovascular:  Negative for chest pain, dyspnea on exertion, leg swelling, near-syncope, orthopnea, palpitations, paroxysmal nocturnal dyspnea and syncope.   Respiratory:  Positive for cough. Negative for shortness of breath.    Gastrointestinal:  Negative for abdominal pain, diarrhea, nausea and vomiting.   Neurological:  Negative for light-headedness.   Psychiatric/Behavioral:  Negative for altered mental status and substance abuse.      Objective:     Vital Signs (Most Recent):  Temp: 98.4 °F (36.9 °C) (06/09/25 0722)  Pulse: 66 (06/09/25 0722)  Resp: 18 (06/09/25 0722)  BP: 130/63 (06/09/25 0722)  SpO2: 100 % (06/09/25 0722) Vital Signs (24h Range):  Temp:  [98.2 °F (36.8 °C)-99 °F (37.2 °C)] 98.4 °F (36.9 °C)  Pulse:  [61-74] 66  Resp:  [18-19] 18  SpO2:  [94 %-100 %] 100 %  BP: ()/(48-68) 130/63     Weight: 90.7 kg (199 lb 15.3 oz)  Body mass index is 27.12 kg/m².    SpO2: 100 %         Intake/Output Summary (Last 24 hours) at 6/9/2025 1110  Last data filed at 6/8/2025 2242  Gross per 24 hour   Intake 100 ml   Output --   Net 100 ml       Lines/Drains/Airways       Peripheral Intravenous Line  Duration                  Hemodialysis AV Fistula Left upper arm -- days         Hemodialysis AV Fistula 02/19/24 0924 Left upper arm 476 days          Peripheral IV - Single Lumen 06/07/25 1136 18 G Right Wrist 1 day         Peripheral IV - Single Lumen 06/07/25 1156 20 G Right Upper Arm 1 day                     Physical Exam  Constitutional:       General: He is not in acute distress.     Appearance: Normal appearance. He is well-developed.   HENT:      Mouth/Throat:      Mouth: Mucous membranes are moist.      Pharynx: Oropharynx is clear.   Cardiovascular:      Rate and Rhythm: Normal rate and regular rhythm.      Heart sounds: Normal heart sounds. No murmur heard.  Pulmonary:      Effort: Pulmonary effort is normal. No respiratory distress.      Breath sounds: Normal breath sounds.   Abdominal:      Palpations: Abdomen is soft.      Tenderness: There is no abdominal tenderness. There is no guarding.   Musculoskeletal:         General: No swelling. Normal range of motion.      Right lower leg: No edema.      Left lower leg: No edema.   Skin:     General: Skin is warm and dry.   Neurological:      Mental Status: He is alert and oriented to person, place, and time.   Psychiatric:         Mood and Affect: Mood normal.         Behavior: Behavior normal.          Significant Labs: All pertinent lab results from the last 24 hours have been reviewed.    Significant Imaging: Reviewed  Assessment and Plan:     Elevated troponin  69yoF with a hx CAD s/p PCI 2006, subsequent PCI to RCA 2/2024, pAF on Eliquis, HFpEF, PAD, ESRD on HD TTS, COPD, who is here with fevers, chills, and admitted with PNA. He has a troponin elevation in the setting of sepsis/PNA. Interventional cardiology is consulted for evaluation.    hsTn elevated but downtrending  Pt without ischemic symptoms  EKG non-ischemic  Echo pending    This is most likely troponin elevation in the setting of demand ischemia due to sepsis. His presentation is not consistent with ACS.     Recommendations:  - No ACS protocol  - If echo shows newly reduced EF, can get a stress test for ischemic evaluation        VTE Risk  Mitigation (From admission, onward)           Ordered     heparin 25,000 units in dextrose 5% (100 units/ml) IV bolus from bag LOW INTENSITY nomogram - [facility]  As needed (PRN)        Question:  Heparin Infusion Adjustment (DO NOT MODIFY ANSWER)  Answer:  [email or web address]\epic\Images\Pharmacy\HeparinInfusions\heparin LOW INTENSITY nomogram for [facility] [id number]    06/08/25 1029     heparin 25,000 units in dextrose 5% (100 units/ml) IV bolus from bag LOW INTENSITY nomogram - [facility]  As needed (PRN)        Question:  Heparin Infusion Adjustment (DO NOT MODIFY ANSWER)  Answer:  \\[email or web address]\epic\Images\Pharmacy\HeparinInfusions\heparin LOW INTENSITY nomogram for [facility] [id number].pdf    06/08/25 1029     heparin 25,000 units in dextrose 5% 250 mL (100 units/mL) infusion LOW INTENSITY nomogram - [facility]  Continuous        Question:  Begin at (units/kg/hr)  Answer:  12    06/08/25 1029     IP VTE HIGH RISK PATIENT  Once         06/07/25 1515     Place sequential compression device  Until discontinued         06/07/25 1515                    Thank you for your consult. I will sign off. Please contact us if you have any additional questions.    Connor M Gillies, DO  Cardiology   [facility] - Med Surg

## 2025-06-09 NOTE — PLAN OF CARE
[facility] - Med Surg  Discharge Reassessment    Primary Care Provider: Anatoliy Colindres MD    Expected Discharge Date: 6/12/2025    SW assigned to patient's care team.  Pt not medically ready for d/c at this time.  Cards consulted.   SW met with patient and pt's wife, Shea, to discuss discharge plan.  Pt independent with mobility and ADLs.  Pt reported he is on Eliquis and Plavixx.  Pt [facility] dialysis [facility] 11:15a.m.   Once medically ready, pt's wife will provide transportation home.     4:17 PM  SW spoke to patient's wife, Shea [phone], and reviewed follow up appointments for PCP, Pulm, and Cards.  Information will also be on AVS.  No other d/c needs at this time.     Future Appointments   Date Time Provider Department Center   6/11/2025  3:30 PM Melinda Baeza, HIR[facility]   6/13/2025  1:00 PM Mayda Hayden MD [facility] CARDIO [place]   6/16/2025  8:30 AM Osiris Gaytan NP [facility] IM [address] PCW   6/18/2025  9:30 AM Jeanne Ohara, PT [facility] RHBOP [place]   6/25/2025  9:30 AM Ebony Bray PA-C [facility] SPINE [facility]   9/12/2025  8:15 AM VASCULAR, LAB [facility] VASCLAB [facility]   9/12/2025  9:00 AM JUANI Melendez III, MD [facility] VASCSUR [facility]   11/17/2025  9:00 AM Anatoliy Colindres MD [facility] [address] PCW          Discharge Plan A and Plan B have been determined by review of patient's clinical status, future medical and therapeutic needs, and coverage/benefits for post-acute care in coordination with multidisciplinary team members.    Reassessment (most recent)       Discharge Reassessment - 06/09/25 1141          Discharge Reassessment    Assessment Type Discharge Planning Reassessment     Did the patient's condition or plan change since previous assessment? No     Discharge Plan discussed with: Patient;Spouse/sig other     Name(s) and Number(s) [phone]     Communicated RAMU with patient/caregiver Yes     Discharge Plan A Home;Home  with family;Other   [facility]; [facility]@ 11:15a.m.    Discharge Plan B Home     DME Needed Upon Discharge  none     Transition of Care Barriers None     Why the patient remains in the hospital Requires continued medical care        Post-Acute Status    Post-Acute Authorization Other     Other Status No Post-Acute Service Needs     Discharge Delays None known at this time                   Meredith Marks LMSW  Part-Time-  [facility]  Ext. [phone]

## 2025-06-09 NOTE — HPI
Ibrahima Phelps is a 69yoF with a hx CAD s/p PCI 2006, subsequent PCI to RCA 2/2024, pAF on Eliquis, HFpEF, PAD, ESRD on HD TTS, COPD, who is here with fevers, chills, and admitted with PNA. He has not reported any chest pain or any other symptoms besides his fevers, chills, and cough. When he had a heart attack in the past he had right and substernal chest pressure, which he has not had since then. hsTn was elevated at [id number], and EKG was non-ischemic. Echo is ordered for today. Interventional is consulted for elevated troponin in the setting of PNA/sepsis.

## 2025-06-10 NOTE — PROGRESS NOTES
DATE: 6/25/2025  PATIENT: Ibrahima Phelps    Supervising Physician: Ha Acosta M.D.    CHIEF COMPLAINT: low back and right leg pain    HISTORY:  Ibrahima Phelps is a 69 y.o. male with a pmhx of CAD, a fib on plavix, sp kidney transplant here for initial evaluation of low back and right leg pain (Back - 0, Leg - 0).  The pain in the right thigh is what bothers him most.  The pain has been present for years, worsening over time. The patient describes the pain as burning.  The pain is worse with walking and improved by rest. There is positive associated numbness and tingling. There is positive subjective weakness. Prior treatments have included gabapentin but no PT, ESIs, surgery. He is scheduled to start PT next week.     The patient denies myelopathic symptoms such as handwriting changes or difficulty with buttons/coins/keys. Denies perineal paresthesias, bowel/bladder dysfunction.    PAST MEDICAL/SURGICAL HISTORY:  Past Medical History:   Diagnosis Date    Atrial fibrillation     CAD (coronary artery disease) 2006    MI in 2006    CHF (congestive heart failure) 2017    CKD (chronic kidney disease) stage 3, GFR 30-59 ml/min     Dr. Latif.  in transplanted kidney    CKD (chronic kidney disease) stage 5, GFR less than 15 ml/min 02/02/2024    COVID-19 02/07/2023    Diverticulosis     Hyperlipidemia     Hypertension     Keloid cicatrix     Metabolic bone disease     Other emphysema 10/01/2019    Pericarditis     S/P kidney transplant 1992    x2 (1992 & 2005),    Thrombocytopenia     Thyroid disease     Tobacco use 09/05/2014     Past Surgical History:   Procedure Laterality Date    AV FISTULA PLACEMENT Left 12/18/2023    Procedure: CREATION, AV FISTULA;  Surgeon: JUANI Melendez III, MD;  Location: [facility] OR [address] [place] [address];  Service: Vascular;  Laterality: Left;  LUE AVF creation vs AVG insertion    CARDIOVERSION  04/30/15    CHOLECYSTECTOMY      COLONOSCOPY  April 20, 2011    Diverticulosis, repeat recommended in 3 yrs., repeat  "colonoscopy 2014 revealed 2 polyps.  He should return in 5 years.    COLONOSCOPY N/A 3/13/2020    Procedure: COLONOSCOPY;  Surgeon: Chon Casper MD;  Location: [facility] (4TH FLR);  Service: Endoscopy;  Laterality: N/A;  ok to hold coumadin x5days-see telephone encounter 2/4/20-tb    COLONOSCOPY N/A 2/4/2021    Procedure: COLONOSCOPY;  Surgeon: Chon Casper MD;  Location: [facility] (4TH FLR);  Service: Endoscopy;  Laterality: N/A;  Eliquis - per Dr. GAVIN Blunt ok to hold x 2 days and "restarted asap"- ERW  last prep "poor", [id number] ordered/ Pt requests Dr. Casper  prep ins. mailed - COVID screening on 2/1/21 PCW- ERW    COLONOSCOPY N/A 3/3/2021    Procedure: COLONOSCOPY;  Surgeon: Chon Casper MD;  Location: [facility] (4TH FLR);  Service: Endoscopy;  Laterality: N/A;  Patient with his second poor bowel pre and has poor prep today.  If patient not intersted or can't get colonoscopy tomorrow will need constipation bowel prep and will need to restart his Eliquis today.Thanks,Chon     per Dr Blunt-ok to hold Eliquis 2 days prior      COVID     COLONOSCOPY N/A 12/6/2024    Procedure: COLONOSCOPY;  Surgeon: Chon Casper MD;  Location: [facility] (2ND FLR);  Service: Endoscopy;  Laterality: N/A;  Plavix-Eliquis pending/ HD T-Th-Sat- lab ordered  2/25/24 ECHO PA 49  ref by / supr inst portal-RN  11/29-message to clearance nurse for plavix and eliquis hold-tb  ok to hold Eliquis 2 days per Dr Parmar  ok to hold Plavix 5 days per MEENA SUTTON  12/2- p    CORONARY ANGIOGRAPHY N/A 2/28/2024    Procedure: ANGIOGRAM, CORONARY ARTERY;  Surgeon: Tylor Lincoln MD;  [facility];  Service: Cardiology;  Laterality: N/A;    CORONARY ANGIOPLASTY WITH STENT PLACEMENT  9/13/2006    FISTULOGRAM N/A 2/19/2024    Procedure: Fistulogram;  Surgeon: JUANI Melendez III, MD;  Location: [facility];  Service: Peripheral Vascular;  Laterality: N/A;    FISTULOGRAM, WITH PTA Left 6/3/2024    " Procedure: FISTULOGRAM, WITH PTA;  Surgeon: JUANI Melendez III, MD;  Location: [facility] OR [facility] FLR;  Service: Vascular;  Laterality: Left;  mGy: 51.58  GyCm2: 6.8285  Fluoro time: 5.1 min  Contrast: 28 cc    FISTULOGRAM, WITH PTA Left 3/5/2025    Procedure: FISTULOGRAM, WITH PTA;  Surgeon: JUANI Melendez III, MD;  [facility];  Service: Vascular;  Laterality: Left;    IRRIGATION AND DEBRIDEMENT Right 8/30/2023    Procedure: IRRIGATION AND DEBRIDEMENT, RIGHT MIDDLE FINGER;  Surgeon: Martin Larsen MD;  [facility];  Service: Orthopedics;  Laterality: Right;    KIDNEY TRANSPLANT  2005    LEFT HEART CATHETERIZATION N/A 2/26/2024    Procedure: Left heart cath;  Surgeon: Tylor Lincoln MD;  Location: [facility] CATH LAB;  Service: Cardiology;  Laterality: N/A;    PARATHYROIDECTOMY      PERCUTANEOUS TRANSLUMINAL ANGIOPLASTY OF ARTERIOVENOUS FISTULA Left 2/19/2024    Procedure: PTA, AV FISTULA;  Surgeon: JUANI Melendez III, MD;  Location: [facility] CATH LAB;  Service: Peripheral Vascular;  Laterality: Left;    STENT, DRUG ELUTING, SINGLE VESSEL, CORONARY N/A 2/28/2024    Procedure: Stent, Drug Eluting, Single Vessel, Coronary;  Surgeon: Tylor Lincoln MD;  Location: [facility] CATH LAB;  Service: Cardiology;  Laterality: N/A;    STENT, PERIPHERAL GRAFT Left 3/5/2025    Procedure: STENT, PERIPHERAL GRAFT;  Surgeon: JUANI Melendez III, MD;  [facility];  Service: Vascular;  Laterality: Left;  mGy 63.17  Gycm2 8.3386  flouro 63.17 min  contrast 38 ml    TREATMENT OF CARDIAC ARRHYTHMIA N/A 3/28/2022    Procedure: CARDIOVERSION;  Surgeon: Migue Blunt MD;  Location: [facility] EP LAB;  Service: Cardiology;  Laterality: N/A;  AF, DCC, MAC, GP, 3 PREP*RODRIGO deferred pt 100% compliant*    VENOPLASTY  6/3/2024    Procedure: ANGIOPLASTY, VEIN;  Surgeon: JUANI Melendez III, MD;  [facility];  Service: Vascular;;  Left subclavian       Medications:   Medications Ordered Prior  "to Encounter[1]    Social History: Social History[2]    REVIEW OF SYSTEMS:  Constitution: Negative. Negative for chills, fever and night sweats.   Cardiovascular: Negative for chest pain and syncope.   Respiratory: Negative for cough and shortness of breath.   Gastrointestinal: See HPI. Negative for nausea/vomiting. Negative for abdominal pain.  Genitourinary: See HPI. Negative for discoloration or dysuria.  Skin: Negative for dry skin, itching and rash.   Hematologic/Lymphatic: Negative for bleeding problem. Does not bruise/bleed easily.   Musculoskeletal: Negative for falls and muscle weakness.   Neurological: See HPI. No seizures.   Endocrine: Negative for polydipsia, polyphagia and polyuria.   Allergic/Immunologic: Negative for hives and persistent infections.     EXAM:  Ht 6' 1" (1.854 m)   Wt 91.6 kg (201 lb 15.1 oz)   BMI 26.64 kg/m²     General: The patient is a very pleasant 69 y.o. male in no apparent distress, the patient is oriented to person, place and time.  Psych: Normal mood and affect  HEENT: Vision grossly intact, hearing intact to the spoken word.  Lungs: Respirations unlabored.  Gait: Normal station and gait, no difficulty with toe or heel walk.   Skin: Dorsal lumbar skin negative for rashes, lesions, hairy patches and surgical scars. There is negative lumbar tenderness to palpation.  Range of motion: Lumbar range of motion is acceptable.  Spinal Balance: Global saggital and coronal spinal balance acceptable, not significant for scoliosis and kyphosis.  Musculoskeletal: No pain with the range of motion of the bilateral hips. No trochanteric tenderness to palpation.  Vascular: Bilateral lower extremities warm and well perfused, dorsalis pedis pulses 2+ bilaterally.  Neurological: Normal strength and tone in all major motor groups in the bilateral lower extremities. Normal sensation to light touch in the L2-S1 dermatomes bilaterally.  Deep tendon reflexes symmetric 2+ in the bilateral lower " extremities.  Negative Babinski bilaterally. Straight leg raise negative bilaterally.    IMAGING:      Today I personally reviewed AP, Lat and Flex/Ex  upright L-spine films that demonstrate moderate degenerative changes     MRI lumbar (2021) demonstrates right extra foraminal disc extrusion at L3-4, moderate-severe right neural foraminal narrowing at L4-S1     Body mass index is 26.64 kg/m².    Hemoglobin A1C   Date Value Ref Range Status   06/11/2024 4.8 4.0 - 5.6 % Final     Comment:     ADA Screening Guidelines:  5.7-6.4%  Consistent with prediabetes  >or=6.5%  Consistent with diabetes    High levels of fetal hemoglobin interfere with the HbA1C  assay. Heterozygous hemoglobin variants (HbS, HgC, etc)do  not significantly interfere with this assay.   However, presence of multiple variants may affect accuracy.     04/09/2024 5.0 4.0 - 5.6 % Final     Comment:     ADA Screening Guidelines:  5.7-6.4%  Consistent with prediabetes  >or=6.5%  Consistent with diabetes    High levels of fetal hemoglobin interfere with the HbA1C  assay. Heterozygous hemoglobin variants (HbS, HgC, etc)do  not significantly interfere with this assay.   However, presence of multiple variants may affect accuracy.     11/23/2023 5.0 4.0 - 5.6 % Final     Comment:     ADA Screening Guidelines:  5.7-6.4%  Consistent with prediabetes  >or=6.5%  Consistent with diabetes    High levels of fetal hemoglobin interfere with the HbA1C  assay. Heterozygous hemoglobin variants (HbS, HgC, etc)do  not significantly interfere with this assay.   However, presence of multiple variants may affect accuracy.             ASSESSMENT/PLAN:    There are no diagnoses linked to this encounter.    Today we discussed at length all of the different treatment options including anti-inflammatories, acetaminophen, rest, ice, heat, physical therapy including strengthening and stretching exercises, home exercises, ROM, aerobic conditioning, aqua therapy, other modalities  including ultrasound, massage, and dry needling, epidural steroid injections and finally surgical intervention.      Pt presents with chronic lumbar radiculopathy. Offered new MRI/SARTHAK, he will think about it and let me know. Will proceed with PT       [1]   Current Outpatient Medications on File Prior to Visit   Medication Sig Dispense Refill    acetaminophen (TYLENOL) 500 MG tablet Take 1 tablet (500 mg total) by mouth every 6 (six) hours as needed for Pain. 20 tablet 0    albuterol (VENTOLIN HFA) 90 mcg/actuation inhaler Inhale 2 puffs into the lungs every 6 (six) hours as needed for Wheezing or Shortness of Breath. Rescue 18 g 3    albuterol-ipratropium (DUO-NEB) 2.5 mg-0.5 mg/3 mL nebulizer solution Take 3 mLs by nebulization every 4 (four) hours as needed for Wheezing or Shortness of Breath. Rescue 120 mL 0    amiodarone (PACERONE) 200 MG Tab TAKE 1 TABLET BY MOUTH EVERY DAY 90 tablet 3    atorvastatin (LIPITOR) 80 MG tablet Take 1 tablet (80 mg total) by mouth once daily. 90 tablet 1    b complex vitamins (B COMPLEX-VITAMIN B12) tablet Take 1 tablet by mouth once daily. 90 tablet 3    clopidogreL (PLAVIX) 75 mg tablet Take 1 tablet (75 mg total) by mouth once daily. 90 tablet 3    ELIQUIS 5 mg Tab TAKE 1 TABLET BY MOUTH TWICE A DAY 60 tablet 32    famotidine (PEPCID) 20 MG tablet Take 1 tablet (20 mg total) by mouth once daily. On dialysis days, please take this medicine after dialysis. Only taking on Tue, Thur, and  Sat. 12 tablet 3    fexofenadine HCl (ALLEGRA ORAL) Take 1 tablet by mouth daily as needed (allergies).      fluticasone furoate-vilanteroL (BREO) 100-25 mcg/dose diskus inhaler Inhale 1 puff into the lungs once daily. Controller. Use this inhaler every day.      fluticasone propionate (FLONASE) 50 mcg/actuation nasal spray 1 spray (50 mcg total) by Each Nostril route daily as needed for Allergies.      gabapentin (NEURONTIN) 300 MG capsule Take 1 capsule (300 mg total) by mouth every evening. 90  capsule 3    ipratropium (ATROVENT) 21 mcg (0.03 %) nasal spray 2 sprays by Each Nostril route 3 (three) times daily. 30 mL 0    midodrine (PROAMATINE) 5 MG Tab Take 1 tablet (5 mg total) by mouth 3 (three) times daily as needed (if blood pressure less than 100/60 mmHg). 90 tablet 0    multivit-min-FA-lycopen-lutein (CENTRUM SILVER) 0.4 mg-300 mcg- 250 mcg Tab Take 1 tablet by mouth once daily.      nitroGLYCERIN (NITROSTAT) 0.4 MG SL tablet Place 1 tablet (0.4 mg total) under the tongue every 5 (five) minutes as needed for Chest pain. 25 tablet 0    omeprazole (PRILOSEC) 20 MG capsule Take 1 capsule (20 mg total) by mouth daily as needed (heartburn).      pulse oximeter (PULSE OXIMETER) device by Apply Externally route 2 (two) times a day. Use twice daily at 8 AM and 3 PM and record the value in [employer] as directed. 1 each 0    sevelamer carbonate (RENVELA) 800 mg Tab This medicine is used to treat your phosphorus level. Please take 1 tablet (800 mg) once a day with the largest meal of the day. 30 tablet 11    torsemide (DEMADEX) 20 MG Tab Take 2 tablets (40 mg total) by mouth 3 (three) times a week. 90 tablet 3     No current facility-administered medications on file prior to visit.   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     Patient is currently retired   Substance and Sexual Activity    Alcohol use: No    Drug use: No    Sexual activity: Yes     Partners: Female   Social History Narrative    Retired      Social Drivers of Health     Financial Resource Strain:  Low Risk  (6/8/2025)    Overall Financial Resource Strain (CARDIA)     Difficulty of Paying Living Expenses: Not very hard   Food Insecurity: No Food Insecurity (6/8/2025)    Hunger Vital Sign     Worried About Running Out of Food in the Last Year: Never true     Ran Out of Food in the Last Year: Never true   Transportation Needs: No Transportation Needs (6/8/2025)    PRAPARE - Transportation     Lack of Transportation (Medical): No     Lack of Transportation (Non-Medical): No   Physical Activity: Inactive (5/21/2025)    Exercise Vital Sign     Days of Exercise per Week: 0 days     Minutes of Exercise per Session: 0 min   Stress: No Stress Concern Present (5/21/2025)    [place] [employer] - Occupational Stress Questionnaire     Feeling of Stress : Not at all   Housing Stability: Low Risk  (6/8/2025)    Housing Stability Vital Sign     Unable to Pay for Housing in the Last Year: No     Number of Times Moved in the Last Year: 0     Homeless in the Last Year: No

## 2025-06-10 NOTE — NURSING
HD complete. 3L removed. Needles removed from LUE AVF and hemostasis achieved. Report given to Ja BEVERLY. Pt transported back to room via

## 2025-06-10 NOTE — PLAN OF CARE
[facility] - Med Surg  Discharge Final Note    Primary Care Provider: Anatoliy Colindres MD    Expected Discharge Date: 6/9/2025    Pt discharged home with no needs.  Pt independent with mobility and ADLs.  Pt receives dialysis on TTHS@ 11:15a.m. at [facility].  Pt wife provided transportation home.      Discharge Plan A and Plan B have been determined by review of patient's clinical status, future medical and therapeutic needs, and coverage/benefits for post-acute care in coordination with multidisciplinary team members.    Future Appointments   Date Time Provider Department Center   6/11/2025  3:30 PM Melinda Baeza, HIRAM [facility]   6/13/2025  1:00 PM Mayda Hayden MD [facility] CARDIO [place]   6/16/2025  8:30 AM Osiris Gaytan, PRINCE [facility] PCW   6/18/2025  9:30 AM Jeanne Ohara, PT [facility] RHBOP [place]   6/25/2025  9:30 AM Ebony Bray PA-C [facility] SPINE [facility]   9/12/2025  8:15 AM VASCULAR, LAB [facility] VASCLAB [facility]   9/12/2025  9:00 AM JUANI Melendez III, MD [facility] VASCSUR [facility]   11/17/2025  9:00 AM Anatoliy Colindres MD [facility] PCW         Final Discharge Note (most recent)       Final Note - 06/10/25 0750          Final Note    Assessment Type Final Discharge Note     Anticipated Discharge Disposition Home or Self Care     Hospital Resources/Appts/Education Provided Provided patient/caregiver with written discharge plan information;Appointments scheduled and added to AVS        Post-Acute Status    Post-Acute Authorization Other     Other Status No Post-Acute Service Needs     Discharge Delays None known at this time                     Important Message from Medicare Sandra Encalade, LMSW  Part-Time-  [facility]  Ext. [phone]

## 2025-06-10 NOTE — PLAN OF CARE
Problem: Adult Inpatient Plan of Care  Goal: Plan of Care Review  Outcome: Adequate for Care Transition  Goal: Patient-Specific Goal (Individualized)  Outcome: Adequate for Care Transition  Goal: Absence of Hospital-Acquired Illness or Injury  Outcome: Adequate for Care Transition  Goal: Optimal Comfort and Wellbeing  Outcome: Adequate for Care Transition  Goal: Readiness for Transition of Care  Outcome: Adequate for Care Transition     Problem: Hemodialysis  Goal: Safe, Effective Therapy Delivery  Outcome: Adequate for Care Transition  Goal: Effective Tissue Perfusion  Outcome: Adequate for Care Transition  Goal: Absence of Infection Signs and Symptoms  Outcome: Adequate for Care Transition     Problem: Sepsis/Septic Shock  Goal: Optimal Coping  Outcome: Adequate for Care Transition  Goal: Absence of Bleeding  Outcome: Adequate for Care Transition  Goal: Blood Glucose Level Within Targeted Range  Outcome: Adequate for Care Transition  Goal: Absence of Infection Signs and Symptoms  Outcome: Adequate for Care Transition  Goal: Optimal Nutrition Intake  Outcome: Adequate for Care Transition     Problem: Fall Injury Risk  Goal: Absence of Fall and Fall-Related Injury  Outcome: Adequate for Care Transition

## 2025-06-11 ENCOUNTER — OFFICE VISIT (OUTPATIENT)
Dept: PULMONOLOGY | Facility: CLINIC | Age: 70
End: 2025-06-11
Payer: MEDICARE

## 2025-06-11 VITALS
DIASTOLIC BLOOD PRESSURE: 60 MMHG | SYSTOLIC BLOOD PRESSURE: 110 MMHG | WEIGHT: 205 LBS | HEIGHT: 72 IN | BODY MASS INDEX: 27.77 KG/M2 | RESPIRATION RATE: 18 BRPM

## 2025-06-11 DIAGNOSIS — J44.9 CHRONIC OBSTRUCTIVE PULMONARY DISEASE, UNSPECIFIED COPD TYPE: Chronic | ICD-10-CM

## 2025-06-11 DIAGNOSIS — J18.9 PNEUMONIA OF RIGHT LUNG DUE TO INFECTIOUS ORGANISM, UNSPECIFIED PART OF LUNG: Primary | ICD-10-CM

## 2025-06-11 PROCEDURE — 1111F DSCHRG MED/CURRENT MED MERGE: CPT | Mod: CPTII,HCNC,S$GLB,

## 2025-06-11 PROCEDURE — 3008F BODY MASS INDEX DOCD: CPT | Mod: CPTII,HCNC,S$GLB,

## 2025-06-11 PROCEDURE — 1125F AMNT PAIN NOTED PAIN PRSNT: CPT | Mod: CPTII,HCNC,S$GLB,

## 2025-06-11 PROCEDURE — 3288F FALL RISK ASSESSMENT DOCD: CPT | Mod: CPTII,HCNC,S$GLB,

## 2025-06-11 PROCEDURE — 1101F PT FALLS ASSESS-DOCD LE1/YR: CPT | Mod: CPTII,HCNC,S$GLB,

## 2025-06-11 PROCEDURE — 3066F NEPHROPATHY DOC TX: CPT | Mod: CPTII,HCNC,S$GLB,

## 2025-06-11 PROCEDURE — 99999 PR PBB SHADOW E&M-EST. PATIENT-LVL V: CPT | Mod: PBBFAC,HCNC,,

## 2025-06-11 PROCEDURE — 99214 OFFICE O/P EST MOD 30 MIN: CPT | Mod: HCNC,S$GLB,,

## 2025-06-11 PROCEDURE — 3078F DIAST BP <80 MM HG: CPT | Mod: CPTII,HCNC,S$GLB,

## 2025-06-11 PROCEDURE — 3074F SYST BP LT 130 MM HG: CPT | Mod: CPTII,HCNC,S$GLB,

## 2025-06-11 RX ORDER — IPRATROPIUM BROMIDE AND ALBUTEROL SULFATE 2.5; .5 MG/3ML; MG/3ML
3 SOLUTION RESPIRATORY (INHALATION) EVERY 4 HOURS PRN
Qty: 120 ML | Refills: 0 | Status: SHIPPED | OUTPATIENT
Start: 2025-06-11 | End: 2026-06-11

## 2025-06-11 NOTE — PROGRESS NOTES
"History and Physical Note  [facility]    Subjective:     Reason for visit: Hospital follow up      Patient ID: Ibrahima Phelps is a 69 y.o. male.    Interval History 2025:  Patient presents today for hospital follow-up after admission for pneumonia.  He was recently hospitalized for pneumonia presenting with feverish chills, generalized weakness, and nonproductive cough. CT chest showed right middle upper lobe consolidation. Blood cultures showed no growth and testing was negative for influenza A and B. Currently, he reports persistent rib cage pain, increased fatigue requiring additional sleep, and feeling sluggish. He notes veering to the right while ambulating and feeling "funny" with this new gait pattern, but denies dizziness or falls.    He has CAD with PCI in , paroxysmal AFib, and heart failure with preserved EF. He is currently on dialysis with history of multiple kidney transplants. He has significant history of respiratory issues including childhood asthma and multiple episodes of pneumonia - 4 times in childhood with additional episodes of walking pneumonia in adulthood.    Brother had a kidney transplant that remained functional for 38 years. Family history significant for kidney disease.    Former smoker who started at age 12-13.     Additional Pulmonary History:  Occupational: ;   Environmental Exposures: none  Exposure to Animals/Pets: none  Travel History: none  Family History of Lung Cancer: none  Childhood Illnesses:  bronchitis; pneumonia 4 times;   Tobacco/Smokin total years  Tobacco Use History[1]    Objective:      Vitals:    25 1440   BP: 110/60   BP Location: Right arm   Patient Position: Sitting   Resp: 18   Weight: 93 kg (205 lb 0.4 oz)   Height: 6' (1.829 m)     Physical Exam  Physical Exam    General: No acute distress. Well-developed. Well-nourished.  Eyes: EOMI. Sclerae anicteric.  HENT: Normocephalic. Atraumatic. Nares patent. Moist oral " mucosa.  Ears: Bilateral TMs clear. Bilateral EACs clear.  Cardiovascular: Regular rate. Regular rhythm. No murmurs. No rubs. No gallops. Normal S1, S2.  Respiratory: Normal respiratory effort. Clear to auscultation bilaterally. No rales. No rhonchi. No wheezing.  Abdomen: Soft. Non-tender. Non-distended. Normoactive bowel sounds.  Musculoskeletal: No  obvious deformity.  Extremities: No lower extremity edema.  Neurological: Alert & oriented x3. No slurred speech. Normal gait.  Psychiatric: Normal mood. Normal affect. Good insight. Good judgment.  Skin: Warm. Dry. No rash.           Personal Diagnostic Review and Interpretation  CXR 06/07/2025:   Development of consolidation within much of the mid and upper right lung suggestive of right-sided pneumonia.     Pertinent Studies Reviewed & Interpreted:     Pulmonary Function Tests:   pending    6 Minute Walk Tests:   Pending    ECHO 12/2024:    Left Ventricle: The left ventricle is normal in size. Ventricular mass is normal. Normal wall thickness. Normal wall motion. There is normal systolic function with a visually estimated ejection fraction of 60 - 65%. Diastolic function cannot be reliably determined in the presence of mitral annular calcification. Normal left ventricular filling pressure.    Right Ventricle: Normal right ventricular cavity size. Wall thickness is normal. Right ventricle wall motion  is normal. Systolic function is normal.    Left Atrium: Left atrium is severely dilated.    Right Atrium: Right atrium is moderately dilated.    Aortic Valve: There is moderate aortic valve sclerosis. There is moderate annular calcification present. Mildly restricted motion. There is mild stenosis. Aortic valve area by VTI is 1.6 cm2. Aortic valve peak velocity is 2.6 m/s. Mean gradient is 18.7 mmHg. The dimensionless index is 0.48. There is mild aortic regurgitation with a centrally directed jet. AV morphology not well seen. Non and left coronary leaflet is calcified  and restricted.    Mitral Valve: There is severe posterior mitral annular calcification present.    Aorta: Aortic root is normal in size measuring 3.45 cm. Ascending aorta is normal measuring 2.78 cm.    Pulmonary Artery: There is mild pulmonary hypertension. The estimated pulmonary artery systolic pressure is 38 mmHg.    IVC/SVC: Normal venous pressure at 3 mmHg.    Pericardium: There is no pericardial effusion.     Other Pertinent Laboratories:  Lab Results   Component Value Date    WBC 12.21 06/09/2025    RBC 3.93 (L) 06/09/2025    HGB 10.5 (L) 06/09/2025    MCV 89 06/09/2025    MCH 26.7 (L) 06/09/2025    MCHC 30.0 (L) 06/09/2025    RDW 18.2 (H) 06/09/2025     (L) 06/09/2025    MPV 11.3 06/09/2025    GRAN 4.3 03/01/2025    GRAN 52.7 03/01/2025    LYMPH 14.8 (L) 06/09/2025    LYMPH 1.81 06/09/2025    MONO 9.7 06/09/2025    MONO 1.18 (H) 06/09/2025    EOS 5.2 06/09/2025    EOS 0.63 (H) 06/09/2025    BASO 0.04 03/01/2025     Assessment:       1. Pneumonia of right lung due to infectious organism, unspecified part of lung    2. Chronic obstructive pulmonary disease, unspecified COPD type         Plan:       Pneumonia of right lung due to infectious organism, unspecified part of lung  -     X-Ray Chest PA And Lateral; Future; Expected date: 06/11/2025  -     albuterol-ipratropium (DUO-NEB) 2.5 mg-0.5 mg/3 mL nebulizer solution; Take 3 mLs by nebulization every 4 (four) hours as needed for Wheezing or Shortness of Breath. Rescue  Dispense: 120 mL; Refill: 0    Chronic obstructive pulmonary disease, unspecified COPD type  -     Ambulatory referral/consult to Pulmonology      Assessment & Plan    J18.9 Pneumonia of right lung due to infectious organism, unspecified part of lung    IMPRESSION:  - Assessed recent hospitalization for  pneumonia  - Reviewed CT chest showing right middle and upper lobe consolidation suggestive of pneumonia.  - Evaluated history of CAD, paroxysmal AFib, heart failure with preserved EF,  and dialysis status.  - Considered history of emphysema and potential COPD progression.  - Continue Levaquin (levofloxacin) to complete the course of antibiotics started in the hospital for outpatient treatment.  - Decided against prescribing steroids at this time, focusing on symptom management and pneumonia resolution.    PNEUMONIA OF RIGHT LUNG DUE TO INFECTIOUS ORGANISM, UNSPECIFIED PART OF LUNG:  - Follow up for future pulmonary function testing to assess for COPD after pneumonia resolution.  - Educated on the use of breathing treatments for symptom relief rather than as primary treatment for pneumonia.  - Patient to monitor for worsening symptoms such as shortness of breath, coughing up green or yellow sputum, or fever.  - Ordered XR Chest (frontal and lateral views) in 2 weeks to assess pneumonia improvement.  - Restarted Duo-Neb (albuterol/ipratropium) nebulizer solution, to be used every 6 hours as needed for breathing difficulties and symptom relief.  - Follow up in 2 weeks for XR Chest, to be scheduled at the imaging center for patient convenience.  - Contact the office through patient portal for any new concerns or questions.  - Explained the nature and implications of emphysema, including its impact on lung tissue and breathing capacity.  - Discussed the relationship between smoking history and emphysema development.  - Clarified the difference between COPD and emphysema, and the potential for COPD to develop over time.  - Explained the purpose and process of pulmonary function testing for COPD diagnosis.       RETURN TO CLINIC in 2 weeks   This note was generated with the assistance of ambient listening technology. Verbal consent was obtained by the patient and accompanying visitor(s) for the recording of patient appointment to facilitate this note. I attest to having reviewed and edited the generated note for accuracy, though some syntax or spelling errors may persist. Please contact the author of  this note for any clarification.    Total professional time spent for the encounter: 31 minutes  Time was spent preparing to see the patient, reviewing results of prior testing, obtaining and/or reviewing separately obtained history, performing a medically appropriate examination and interview, counseling and educating the patient/family, ordering medications/tests/procedures, referring and communicating with other health care professionals, documenting clinical information in the electronic health record, and independently interpreting results.    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    Patient is currently retired

## 2025-06-12 LAB
BACTERIA BLD CULT: NORMAL
BACTERIA BLD CULT: NORMAL

## 2025-06-16 ENCOUNTER — PATIENT MESSAGE (OUTPATIENT)
Dept: CARDIOLOGY | Facility: CLINIC | Age: 70
End: 2025-06-16
Payer: MEDICARE

## 2025-06-18 ENCOUNTER — CLINICAL SUPPORT (OUTPATIENT)
Dept: REHABILITATION | Facility: HOSPITAL | Age: 70
End: 2025-06-18
Payer: MEDICARE

## 2025-06-18 ENCOUNTER — OFFICE VISIT (OUTPATIENT)
Dept: INTERNAL MEDICINE | Facility: CLINIC | Age: 70
End: 2025-06-18
Payer: MEDICARE

## 2025-06-18 VITALS
BODY MASS INDEX: 27.35 KG/M2 | OXYGEN SATURATION: 97 % | DIASTOLIC BLOOD PRESSURE: 56 MMHG | WEIGHT: 201.94 LBS | SYSTOLIC BLOOD PRESSURE: 100 MMHG | HEART RATE: 81 BPM | HEIGHT: 72 IN

## 2025-06-18 DIAGNOSIS — A41.9 SEPSIS WITHOUT ACUTE ORGAN DYSFUNCTION, DUE TO UNSPECIFIED ORGANISM: ICD-10-CM

## 2025-06-18 DIAGNOSIS — I48.0 PAROXYSMAL ATRIAL FIBRILLATION: Chronic | ICD-10-CM

## 2025-06-18 DIAGNOSIS — Z09 HOSPITAL DISCHARGE FOLLOW-UP: Primary | ICD-10-CM

## 2025-06-18 DIAGNOSIS — R29.898 DECREASED STRENGTH OF TRUNK AND BACK: Primary | ICD-10-CM

## 2025-06-18 DIAGNOSIS — M54.41 ACUTE RIGHT-SIDED BACK PAIN WITH SCIATICA: ICD-10-CM

## 2025-06-18 DIAGNOSIS — Z99.2 ESRD (END STAGE RENAL DISEASE) ON DIALYSIS: Chronic | ICD-10-CM

## 2025-06-18 DIAGNOSIS — J15.9 COMMUNITY ACQUIRED BACTERIAL PNEUMONIA: ICD-10-CM

## 2025-06-18 DIAGNOSIS — J44.9 CHRONIC OBSTRUCTIVE PULMONARY DISEASE, UNSPECIFIED COPD TYPE: Chronic | ICD-10-CM

## 2025-06-18 DIAGNOSIS — N18.6 ESRD (END STAGE RENAL DISEASE) ON DIALYSIS: Chronic | ICD-10-CM

## 2025-06-18 DIAGNOSIS — M54.50 LUMBAR SPINE PAIN: ICD-10-CM

## 2025-06-18 PROCEDURE — 97750 PHYSICAL PERFORMANCE TEST: CPT | Mod: 32

## 2025-06-18 PROCEDURE — 99999 PR PBB SHADOW E&M-EST. PATIENT-LVL V: CPT | Mod: PBBFAC,HCNC,, | Performed by: NURSE PRACTITIONER

## 2025-06-18 NOTE — PROGRESS NOTES
INTERNAL MEDICINE PROGRESS/URGENT CARE NOTE    CHIEF COMPLAINT     Chief Complaint   Patient presents with    Follow-up       HPI     Ibrahima Phelps is a 69 y.o. male who presents for an urgent/follow up visit today.    PCP: Dr. Colindres    [facility] f/u. Hospitalized 6/7/25 again with PNA and sepsis. See note below.       69-year-old male with a history of CAD s/p PCI 2006, PCI to RCA w/ 2 DIEGO 2/28/2024, paroxysmal Afib (on amiodarone and Eliquis), HFpEF, PAD, ESRD on HD TTS, COPD who presents with fevers, chills, generalized weakness and new congested cough; non-productive cough. Patient says that this feels like his previous episode of pneumonia last month. He Endorses that he has had pneumonia multiple times as a child and twice during his adulthood. Wife reports confusion and disorientation this morning. He receives dialysis Tu/Thurs/Sat; scheduled for dialysis this afternoon. Per EMS, on their arrival, pt's oral temperature was 105. Pt was given ~500 mL of LR en route + arrived w/ ice packs to neck. EMS reports patient's wife gave patient 1 g of Tylenol prior to their arrival.      Of note, patient was discharged on 5/21 and was treated for fevers, chills. Started on IV Rocephin and Azithromycin for for CAP coverage. Had troponin peak but low risk ACS per cardiology. He was discharged on PO antibiotics (azithromycin 500mg QD and cefpodoxime 200mg QD for PNA).     In the ED, patient was febrile (Tmax 102.9), hypotensive /46-51, HR 92-78. Labs were remarkable for leukocytosis (WBC 13.51), hgb 12.1, hct 38.3, plt 172, CO2 18, BUN 56, elevated Cr 10.5 (ESRD), hypocalcemic (Ca 7.4). POC lactate trended down from 2.3 to 2. Flu was negative. CXR showed consolidation of mid and upper right lung suggestive of right-sided pneumonia. EKG showed nonspecific ST and T wave abnormality, unchanged from last EKG. He was given 1 dose of vancomycin + cefepime. Patient was admitted to hospital medicine for treatment of  pneumonia.      Hospital Course:   69-year-old male with a history of CAD s/p PCI 2006, PCI to RCA w/ 2 DIEGO 2/28/2024, paroxysmal Afib (on amiodarone and Eliquis), HFpEF, PAD, ESRD on HD TTS, COPD who presented with fevers, chills, generalized weakness, non-productive cough here for pneumonia. He was treated with IV vanc, zosyn and azithromycin. MRSA nares negative and vanc removed. Dialyzed tues/thurs/sat with midodrine. Troponin peaked at 1297. Started on ACS protocol (ASA, clopidogrel, statin, heparin gtt). EKG Nonspecific ST and T wave abnormality, unchanged. Echo ordered. Interventional cards consulted for ACS rule out. No concern at this time as the trop elevation was likely demand ischemia due to sepsis. ACS protocol removed. Can get a stress test for ischemic evaluation outpatient. He will also need an echo. Patient was discharged in stable condition with PCP f/u, pulmonology referral for PFTs and cardiology referral. He was prescribed course of Levaquin to cover CAP.     Has been doing well since discharge.   Saw pulm 6/11/25. Plan to repeat his cxr and f/u them in 2 weeks.   No recent fevers or sob.   States he feels well and has no acute issues.   Going to dialysis t, th,sat. Takes midodrine on those days.   No cp, dizziness, light headedness      Problem List[1]     Past Medical History:  Past Medical History:   Diagnosis Date    Atrial fibrillation     CAD (coronary artery disease) 2006    MI in 2006    CHF (congestive heart failure) 2017    CKD (chronic kidney disease) stage 3, GFR 30-59 ml/min     Dr. Latif.  in transplanted kidney    CKD (chronic kidney disease) stage 5, GFR less than 15 ml/min 02/02/2024    COVID-19 02/07/2023    Diverticulosis     Hyperlipidemia     Hypertension     Keloid cicatrix     Metabolic bone disease     Other emphysema 10/01/2019    Pericarditis     S/P kidney transplant 1992    x2 (1992 & 2005),    Thrombocytopenia     Thyroid disease     Tobacco use 09/05/2014     "    Past Surgical History:  Past Surgical History:   Procedure Laterality Date    AV FISTULA PLACEMENT Left 12/18/2023    Procedure: CREATION, AV FISTULA;  Surgeon: JUANI Melendez III, MD;  Location: [facility] OR 2ND FLR;  Service: Vascular;  Laterality: Left;  LUE AVF creation vs AVG insertion    CARDIOVERSION  04/30/15    CHOLECYSTECTOMY      COLONOSCOPY  April 20, 2011    Diverticulosis, repeat recommended in 3 yrs., repeat colonoscopy 2014 revealed 2 polyps.  He should return in 5 years.    COLONOSCOPY N/A 3/13/2020    Procedure: COLONOSCOPY;  Surgeon: Chon Casper MD;  Location: [facility] (4TH FLR);  Service: Endoscopy;  Laterality: N/A;  ok to hold coumadin x5days-see telephone encounter 2/4/20-tb    COLONOSCOPY N/A 2/4/2021    Procedure: COLONOSCOPY;  Surgeon: Chon Casper MD;  Location: [facility] (4TH FLR);  Service: Endoscopy;  Laterality: N/A;  Eliquis - per Dr. GAVIN Blunt ok to hold x 2 days and "restarted asap"- ERW  last prep "poor", [id number] ordered/ Pt requests Dr. Casper  prep ins. mailed - COVID screening on 2/1/21 PCW- ERW    COLONOSCOPY N/A 3/3/2021    Procedure: COLONOSCOPY;  Surgeon: Chon Casper MD;  Location: [facility] (4TH FLR);  Service: Endoscopy;  Laterality: N/A;  Patient with his second poor bowel pre and has poor prep today.  If patient not intersted or can't get colonoscopy tomorrow will need constipation bowel prep and will need to restart his Eliquis today.Thanks,Chon     per Dr Blunt-ok to hold Eliquis 2 days prior      COVID     COLONOSCOPY N/A 12/6/2024    Procedure: COLONOSCOPY;  Surgeon: Chon Casper MD;  Location: [facility] (2ND FLR);  Service: Endoscopy;  Laterality: N/A;  Plavix-Eliquis pending/ HD T-Th-Sat- lab ordered  2/25/24 ECHO PA 49  ref by / [facility] portal-RN  11/29-message to clearance nurse for plavix and eliquis hold-tb  ok to hold Eliquis 2 days per Dr Parmar  ok to hold Plavix 5 days per MEENA Jackson PAOscarGT  12/2- p    CORONARY " ANGIOGRAPHY N/A 2/28/2024    Procedure: ANGIOGRAM, CORONARY ARTERY;  Surgeon: Tylor Lincoln MD;  Location: [facility] CATH LAB;  Service: Cardiology;  Laterality: N/A;    CORONARY ANGIOPLASTY WITH STENT PLACEMENT  9/13/2006    FISTULOGRAM N/A 2/19/2024    Procedure: Fistulogram;  Surgeon: JUANI Melendez III, MD;  Location: [facility] CATH LAB;  Service: Peripheral Vascular;  Laterality: N/A;    FISTULOGRAM, WITH PTA Left 6/3/2024    Procedure: FISTULOGRAM, WITH PTA;  Surgeon: JUANI Melendez III, MD;  Location: [facility] OR [facility] FLR;  Service: Vascular;  Laterality: Left;  mGy: 51.58  GyCm2: 6.8285  Fluoro time: 5.1 min  Contrast: 28 cc    FISTULOGRAM, WITH PTA Left 3/5/2025    Procedure: FISTULOGRAM, WITH PTA;  Surgeon: JUANI Melendez III, MD;  Location: [facility] OR [facility] FLR;  Service: Vascular;  Laterality: Left;    IRRIGATION AND DEBRIDEMENT Right 8/30/2023    Procedure: IRRIGATION AND DEBRIDEMENT, RIGHT MIDDLE FINGER;  Surgeon: Martin Larsen MD;  [facility];  Service: Orthopedics;  Laterality: Right;    KIDNEY TRANSPLANT  2005    LEFT HEART CATHETERIZATION N/A 2/26/2024    Procedure: Left heart cath;  Surgeon: Tylor Lincoln MD;  Location: [facility] CATH LAB;  Service: Cardiology;  Laterality: N/A;    PARATHYROIDECTOMY      PERCUTANEOUS TRANSLUMINAL ANGIOPLASTY OF ARTERIOVENOUS FISTULA Left 2/19/2024    Procedure: PTA, AV FISTULA;  Surgeon: JUANI Melendez III, MD;  Location: [facility] CATH LAB;  Service: Peripheral Vascular;  Laterality: Left;    STENT, DRUG ELUTING, SINGLE VESSEL, CORONARY N/A 2/28/2024    Procedure: Stent, Drug Eluting, Single Vessel, Coronary;  Surgeon: Tylor Lincoln MD;  Location: [facility] CATH LAB;  Service: Cardiology;  Laterality: N/A;    STENT, PERIPHERAL GRAFT Left 3/5/2025    Procedure: STENT, PERIPHERAL GRAFT;  Surgeon: JUANI Melendez III, MD;  Location: [facility] OR 2ND FLR;  Service: Vascular;  Laterality: Left;  mGy 63.17  Gycm2 8.3386  flouro 63.17 min  contrast 38 ml     TREATMENT OF CARDIAC ARRHYTHMIA N/A 3/28/2022    Procedure: CARDIOVERSION;  Surgeon: Migue Blunt MD;  Location: [facility] EP LAB;  Service: Cardiology;  Laterality: N/A;  AF, DCC, MAC, GP, 3 PREP*RODRIGO deferred pt 100% compliant*    VENOPLASTY  6/3/2024    Procedure: ANGIOPLASTY, VEIN;  Surgeon: JUANI Melendez III, MD;  Location: [facility] OR [address] [place] [address];  Service: Vascular;;  Left subclavian        Allergies:  Review of patient's allergies indicates:   Allergen Reactions    Ace inhibitors Swelling    Iodine Itching    Verapamil Other (See Comments)     Other reaction(s): Unknown    Povidone-iodine Itching       Home Medications:  Current Medications[2]     Review of Systems:  Review of Systems   Constitutional:  Negative for fever.   Respiratory:  Negative for cough and shortness of breath.    Cardiovascular:  Negative for chest pain.   Neurological:  Negative for dizziness, weakness, light-headedness and headaches.         PHYSICAL EXAM     Vitals:    06/18/25 1055 06/18/25 1102   BP: (!) 98/52 (!) 100/56   BP Location: Right arm    Patient Position: Sitting    Pulse: 81    SpO2: 97%    Weight: 91.6 kg (201 lb 15.1 oz)    Height: 6' (1.829 m)       Body mass index is 27.39 kg/m².     Physical Exam  Vitals reviewed.   Constitutional:       Appearance: Normal appearance. He is not ill-appearing.   HENT:      Head: Normocephalic.      Mouth/Throat:      Mouth: Mucous membranes are moist.      Pharynx: Oropharynx is clear.   Eyes:      Conjunctiva/sclera: Conjunctivae normal.      Pupils: Pupils are equal, round, and reactive to light.   Cardiovascular:      Rate and Rhythm: Normal rate. Rhythm irregular.   Pulmonary:      Effort: Pulmonary effort is normal.      Breath sounds: Normal breath sounds.   Musculoskeletal:      Right lower leg: No edema.      Left lower leg: No edema.   Skin:     General: Skin is warm and dry.   Neurological:      Mental Status: He is alert and oriented to person, place, and time.   Psychiatric:          Mood and Affect: Mood normal.         Behavior: Behavior normal.         LABS     Lab Results   Component Value Date    HGBA1C 4.8 06/11/2024     CMP  Sodium   Date Value Ref Range Status   06/09/2025 133 (L) 136 - 145 mmol/L Final   03/01/2025 136 136 - 145 mmol/L Final     Potassium   Date Value Ref Range Status   06/09/2025 5.3 (H) 3.5 - 5.1 mmol/L Final   03/01/2025 5.5 (H) 3.5 - 5.1 mmol/L Final     Comment:     *No Visible Hemolysis     Chloride   Date Value Ref Range Status   06/09/2025 101 95 - 110 mmol/L Final   03/01/2025 99 95 - 110 mmol/L Final     CO2   Date Value Ref Range Status   06/09/2025 15 (L) 23 - 29 mmol/L Final   03/01/2025 21 (L) 23 - 29 mmol/L Final     Glucose   Date Value Ref Range Status   06/09/2025 56 (L) 70 - 110 mg/dL Final   03/01/2025 99 70 - 110 mg/dL Final     BUN   Date Value Ref Range Status   06/09/2025 65 (H) 8 - 23 mg/dL Final     Creatinine   Date Value Ref Range Status   06/09/2025 11.2 (H) 0.5 - 1.4 mg/dL Final     Comment:     Action limit exceeded. Possible specimen integrity issue - Investigate     Calcium   Date Value Ref Range Status   06/09/2025 7.1 (L) 8.7 - 10.5 mg/dL Final   03/01/2025 8.5 (L) 8.7 - 10.5 mg/dL Final     Protein Total   Date Value Ref Range Status   06/09/2025 6.4 6.0 - 8.4 gm/dL Final     Total Protein   Date Value Ref Range Status   03/01/2025 7.0 6.0 - 8.4 g/dL Final     Albumin   Date Value Ref Range Status   06/09/2025 2.5 (L) 3.5 - 5.2 g/dL Final   03/01/2025 2.8 (L) 3.5 - 5.2 g/dL Final     Total Bilirubin   Date Value Ref Range Status   03/01/2025 0.4 0.1 - 1.0 mg/dL Final     Comment:     For infants and newborns, interpretation of results should be based  on gestational age, weight and in agreement with clinical  observations.    Premature Infant recommended reference ranges:  Up to 24 hours.............<8.0 mg/dL  Up to 48 hours............<12.0 mg/dL  3-5 days..................<15.0 mg/dL  6-29 days.................<15.0 mg/dL        Bilirubin Total   Date Value Ref Range Status   06/09/2025 0.4 0.1 - 1.0 mg/dL Final     Comment:     For infants and newborns, interpretation of results should be based   on gestational age, weight and in agreement with clinical   observations.    Premature Infant recommended reference ranges:   0-24 hours:  <8.0 mg/dL   24-48 hours: <12.0 mg/dL   3-5 days:    <15.0 mg/dL   6-29 days:   <15.0 mg/dL     Alkaline Phosphatase   Date Value Ref Range Status   03/01/2025 75 40 - 150 U/L Final     ALP   Date Value Ref Range Status   06/09/2025 107 40 - 150 unit/L Final     AST   Date Value Ref Range Status   06/09/2025 23 11 - 45 unit/L Final   03/01/2025 16 10 - 40 U/L Final     ALT   Date Value Ref Range Status   06/09/2025 17 10 - 44 unit/L Final   03/01/2025 20 10 - 44 U/L Final     Anion Gap   Date Value Ref Range Status   06/09/2025 17 (H) 8 - 16 mmol/L Final     Comment:     UNABLE TO CALCULATE     eGFR if    Date Value Ref Range Status   07/30/2022 25.8 (A) >60 mL/min/1.73 m^2 Final     eGFR if non    Date Value Ref Range Status   07/30/2022 22.3 (A) >60 mL/min/1.73 m^2 Final     Comment:     Calculation used to obtain the estimated glomerular filtration  rate (eGFR) is the CKD-EPI equation.        Lab Results   Component Value Date    WBC 12.21 06/09/2025    HGB 10.5 (L) 06/09/2025    HCT 35.0 (L) 06/09/2025    MCV 89 06/09/2025     (L) 06/09/2025     Lab Results   Component Value Date    CHOL 109 (L) 06/11/2024    CHOL 109 (L) 06/11/2024    CHOL 152 01/04/2023     Lab Results   Component Value Date    HDL 54 06/11/2024    HDL 54 06/11/2024    HDL 47 01/04/2023     Lab Results   Component Value Date    LDLCALC 44.8 (L) 06/11/2024    LDLCALC 44.8 (L) 06/11/2024    LDLCALC 87.4 01/04/2023     Lab Results   Component Value Date    TRIG 51 06/11/2024    TRIG 51 06/11/2024    TRIG 88 01/04/2023     Lab Results   Component Value Date    CHOLHDL 49.5 06/11/2024    CHOLHDL 49.5  06/11/2024    CHOLHDL 30.9 01/04/2023     Lab Results   Component Value Date    TSH 1.897 06/07/2025       ASSESSMENT     1. Hospital discharge follow-up    2. Sepsis without acute organ dysfunction, due to unspecified organism    3. Community acquired bacterial pneumonia    4. ESRD (end stage renal disease) on dialysis    5. Paroxysmal atrial fibrillation    6. Chronic obstructive pulmonary disease, unspecified COPD type           PLAN  1. Hospital discharge follow-up  Meds reconciled. Notes and labs reviewed.     2. Sepsis without acute organ dysfunction, due to unspecified organism  Resolved.   -     Ambulatory referral/consult to Internal Medicine    3. Community acquired bacterial pneumonia  Completed course of levaquin. Clear BBS. Following with pulm    4. ESRD (end stage renal disease) on dialysis  Stable. BP soft today but asymptomatic. Continue dialysis and midodrine as prescribed. I filled out handicap form for him    5. Paroxysmal atrial fibrillation  Rate controlled. On eliquis, amio    6. Chronic obstructive pulmonary disease, unspecified COPD type  Stable. On breo. Follows with pulm.         Follow up with PCP and specialists as scheduled.    Patient's plan/treatment was discussed including medications and possible side effects. Verbalized understanding of all instructions.     This note was partly generated with [employer] voice recognition software. I apologize for any possible typographical errors.          ADAM Lee  Department of Internal Medicine - [facility]  06/19/2025          [1]   Patient Active Problem List  Diagnosis    Primary hypertension    Chest pain in patient with CAD (coronary artery disease)    Mixed hyperlipidemia    Carotid artery bruit    Secondary renal hyperparathyroidism    History of adenomatous polyp of colon    Calcific tendinitis of left shoulder    Left rotator cuff tear    Impingement syndrome of left shoulder    Long term (current) use of  anticoagulants [V58.61]    H/O kidney transplant    Elevated troponin    Hypertensive heart and kidney disease    Paroxysmal atrial fibrillation    Multiple lung nodules on CT    Carotid artery disease    Postural tremor    Polyneuropathy    Former smoker    Snoring    Atherosclerotic heart disease of native coronary artery without angina pectoris    Memory changes    Pulmonary hypertension    Immunocompromised state due to drug therapy    Benign essential tremor    Immunosuppressive management encounter following kidney transplant    NSTEMI (non-ST elevated myocardial infarction)    ESRD (end stage renal disease) on dialysis    GERD (gastroesophageal reflux disease)    Failed kidney transplant    Other disorders of phosphorus metabolism    Anemia in chronic kidney disease    Calcified granuloma of lung    Stenosis of arteriovenous dialysis fistula    Tachycardia-bradycardia syndrome    Unspecified diastolic (congestive) heart failure    Mild aortic stenosis    History of non-ST elevation myocardial infarction (NSTEMI)    History of coronary angioplasty with insertion of stent    Hypothyroidism    Chronic obstructive pulmonary disease, unspecified    Orthostatic hypotension    Coronary artery disease involving native coronary artery of native heart without angina pectoris    Other emphysema    Hemodialysis-associated hypotension    Old myocardial infarction    Community acquired bacterial pneumonia    Severe sepsis    Decreased strength of trunk and back   [2]   Current Outpatient Medications:     acetaminophen (TYLENOL) 500 MG tablet, Take 1 tablet (500 mg total) by mouth every 6 (six) hours as needed for Pain., Disp: 20 tablet, Rfl: 0    albuterol (VENTOLIN HFA) 90 mcg/actuation inhaler, Inhale 2 puffs into the lungs every 6 (six) hours as needed for Wheezing or Shortness of Breath. Rescue, Disp: 18 g, Rfl: 3    albuterol-ipratropium (DUO-NEB) 2.5 mg-0.5 mg/3 mL nebulizer solution, Take 3 mLs by nebulization every  4 (four) hours as needed for Wheezing or Shortness of Breath. Rescue, Disp: 120 mL, Rfl: 0    amiodarone (PACERONE) 200 MG Tab, TAKE 1 TABLET BY MOUTH EVERY DAY, Disp: 90 tablet, Rfl: 3    atorvastatin (LIPITOR) 80 MG tablet, Take 1 tablet (80 mg total) by mouth once daily., Disp: 90 tablet, Rfl: 1    b complex vitamins (B COMPLEX-VITAMIN B12) tablet, Take 1 tablet by mouth once daily., Disp: 90 tablet, Rfl: 3    clopidogreL (PLAVIX) 75 mg tablet, Take 1 tablet (75 mg total) by mouth once daily., Disp: 90 tablet, Rfl: 3    ELIQUIS 5 mg Tab, TAKE 1 TABLET BY MOUTH TWICE A DAY, Disp: 60 tablet, Rfl: 32    famotidine (PEPCID) 20 MG tablet, Take 1 tablet (20 mg total) by mouth once daily. On dialysis days, please take this medicine after dialysis. Only taking on Tue, Thur, and  Sat., Disp: 12 tablet, Rfl: 3    fexofenadine HCl (ALLEGRA ORAL), Take 1 tablet by mouth daily as needed (allergies)., Disp: , Rfl:     fluticasone furoate-vilanteroL (BREO) 100-25 mcg/dose diskus inhaler, Inhale 1 puff into the lungs once daily. Controller. Use this inhaler every day., Disp: , Rfl:     fluticasone propionate (FLONASE) 50 mcg/actuation nasal spray, 1 spray (50 mcg total) by Each Nostril route daily as needed for Allergies., Disp: , Rfl:     gabapentin (NEURONTIN) 300 MG capsule, Take 1 capsule (300 mg total) by mouth every evening., Disp: 90 capsule, Rfl: 3    ipratropium (ATROVENT) 21 mcg (0.03 %) nasal spray, 2 sprays by Each Nostril route 3 (three) times daily., Disp: 30 mL, Rfl: 0    midodrine (PROAMATINE) 5 MG Tab, Take 1 tablet (5 mg total) by mouth 3 (three) times daily as needed (if blood pressure less than 100/60 mmHg)., Disp: 90 tablet, Rfl: 0    multivit-min-FA-lycopen-lutein (CENTRUM SILVER) 0.4 mg-300 mcg- 250 mcg Tab, Take 1 tablet by mouth once daily., Disp: , Rfl:     nitroGLYCERIN (NITROSTAT) 0.4 MG SL tablet, Place 1 tablet (0.4 mg total) under the tongue every 5 (five) minutes as needed for Chest pain., Disp:  25 tablet, Rfl: 0    pulse oximeter (PULSE OXIMETER) device, by Apply Externally route 2 (two) times a day. Use twice daily at 8 AM and 3 PM and record the value in MyChart as directed., Disp: 1 each, Rfl: 0    sevelamer carbonate (RENVELA) 800 mg Tab, This medicine is used to treat your phosphorus level. Please take 1 tablet (800 mg) once a day with the largest meal of the day., Disp: 30 tablet, Rfl: 11    torsemide (DEMADEX) 20 MG Tab, Take 2 tablets (40 mg total) by mouth 3 (three) times a week., Disp: 90 tablet, Rfl: 3    omeprazole (PRILOSEC) 20 MG capsule, Take 1 capsule (20 mg total) by mouth daily as needed (heartburn)., Disp: , Rfl:

## 2025-06-18 NOTE — PROGRESS NOTES
Outpatient Rehab    Physical Therapy Evaluation    Patient Name: Ibrahima Phelps  MRN: [id number]  YOB: 1955  Encounter Date: 6/18/2025    Therapy Diagnosis:   Encounter Diagnoses   Name Primary?    Lumbar spine pain     Acute right-sided back pain with sciatica     Decreased strength of trunk and back Yes     Physician: Sb Roca PA*    Physician Orders: Eval and Treat  Medical Diagnosis: Lumbar spine pain  Acute right-sided back pain with sciatica  Surgical Diagnosis: Not applicable for this Episode   Surgical Date: Not applicable for this Episode  Days Since Last Surgery: Not applicable for this Episode    Visit # / Visits Authorized:  1 / 1  Insurance Authorization Period: 5/30/2025 to 5/30/2026  Date of Evaluation: 6/18/2025  Plan of Care Certification: 6/18/2025 to 8/27/2025   Reassessment Due: 7/18/2025    Pattern of pain determined: 2  Time In: 0930   Time Out: 1030  Total Time (in minutes): 60   Total Billable Time (in minutes):      Intake Outcome Measure for FOTO Survey    Therapist reviewed FOTO scores for Ibrahima Phelps on 6/18/2025.   FOTO report - see Media section or FOTO account episode details.     Intake Score: 54 (goal 61)%        Precautions:     A fib, CAD, CHF, CKD, HTN, h/o kidney transplant x 2      Subjective   History of Present Illness  Ibrahima is a 69 y.o. male      The patient reports a medical diagnosis of M54.50 (ICD-10-CM) - Lumbar spine pain  M54.41 (ICD-10-CM) - Acute right-sided back pain with sciatica.    Diagnostic tests related to this condition: X-ray.   X-Ray Details: XR LUMBAR SPINE AP AND LAT WITH FLEX/EXT     CLINICAL HISTORY:  lumbar  spine xray;  Low back pain, unspecified     TECHNIQUE:  AP and lateral views as well as lateral flexion and extension images are performed through the lumbar spine.     COMPARISON:  01/19/2021     FINDINGS:  Diffuse degenerative disc disease of the lumbar spine most severely affecting L3/L4 and L5/S1.  Diffuse facet  "osteoarthritis most severely affecting L4/L5 and L5/S1.  No spondylolisthesis in the neutral position.  No change in alignment with flexion or extension.  There is mild levo scoliotic curvature of the lumbar spine.    History of Present Condition/Illness: Date of onset: about a year ago History of current condition: Ibrahima reports right sided low back and hip tightness.  Some days he could not bend over to tie his shoes.  A few months ago, he says the leg started to give way, and he fell a few times.  He says he has a history of bad discs and that the left ones were bad in the past and now the right ones are bad.  He says he has not had any occurrence of falls of buckling in the past month. He also says if he sits and his knees are higher than his buttocks, he needs to take a few minutes to straighten up.  He denies any bowel or bladder incontinence, no changes in sensation/saddle sensation.  He did have right lateral thigh burning pain but he has not had that burning sensation for the past month.   Pt was [facility] to [facility] 6/7-6/9 due to pneumonia, sepsis.  Pt with extensive PMH, on dialysis TIW (T/TH/Sa) Prior Therapy: yes Prior Treatment: none Social History:  lives with their spouse Occupation: retired truck drives  Leisure: watching tv     DME owned/used: none Gym Membership: no Pain: Current 0/10, worst 5/10, best 0/10  Location: bilateral back  Description: Tight Aggravating Factors: Getting out of bed/chair Easing Factors:  stretching Disturbed Sleep: no Pattern of pain questions: 1.  Where is your pain the worst? back 2.  Is your pain constant or intermittent? intermittent 3.  Does bending forward make your typical pain worse? yes 4.  Since the start of your back pain, has there been a change in your bowel or bladder? no 5.  What can't you do now that you use to be able to do? Avoid lifting things Pt's goals: "strengthen my heart" Red Flag Screening:  Cough/Sneeze Strain: (--) Bladder/Bowel: (--) " Falls: (+) Night pain: (--) Unexplained weight loss: (--) General health: fair          Past Medical History/Physical Systems Review:   Ibrahima Phelps  has a past medical history of Atrial fibrillation, CAD (coronary artery disease), CHF (congestive heart failure), CKD (chronic kidney disease) stage 3, GFR 30-59 ml/min, CKD (chronic kidney disease) stage 5, GFR less than 15 ml/min, COVID-19, Diverticulosis, Hyperlipidemia, Hypertension, Keloid cicatrix, Metabolic bone disease, Other emphysema, Pericarditis, S/P kidney transplant, Thrombocytopenia, Thyroid disease, and Tobacco use.    Ibrahima Phelps  has a past surgical history that includes Cholecystectomy; Cardioversion (04/30/15); Kidney transplant (2005); Parathyroidectomy; Colonoscopy (April 20, 2011); Colonoscopy (N/A, 3/13/2020); Colonoscopy (N/A, 2/4/2021); Colonoscopy (N/A, 3/3/2021); Coronary angioplasty with stent (9/13/2006); Treatment of cardiac arrhythmia (N/A, 3/28/2022); irrigation and debridement (Right, 8/30/2023); AV fistula placement (Left, 12/18/2023); Percutaneous transluminal angioplasty of arteriovenous fistula (Left, 2/19/2024); Fistulogram (N/A, 2/19/2024); Left heart catheterization (N/A, 2/26/2024); stent, drug eluting, single vessel, coronary (N/A, 2/28/2024); Coronary angiography (N/A, 2/28/2024); fistulogram, with pta (Left, 6/3/2024); Venoplasty (6/3/2024); Colonoscopy (N/A, 12/6/2024); fistulogram, with pta (Left, 3/5/2025); and stent, peripheral graft (Left, 3/5/2025).    Ibrahima has a current medication list which includes the following prescription(s): acetaminophen, albuterol, albuterol-ipratropium, amiodarone, atorvastatin, b complex vitamins, clopidogrel, eliquis, famotidine, fexofenadine hcl, fluticasone furoate-vilanterol, fluticasone propionate, gabapentin, ipratropium, midodrine, centrum silver, nitroglycerin, omeprazole, pulse oximeter, sevelamer carbonate, and torsemide.    Review of patient's allergies indicates:   Allergen  Reactions    Ace inhibitors Swelling    Iodine Itching    Verapamil Other (See Comments)     Other reaction(s): Unknown    Povidone-iodine Itching        Objective    Postural examination/scapula alignment: Head forward and Decreased lordosis  Skin integrity:WNL   Edema: None  Correction of posture: better with lumbar roll  Sitting: slouched  Standing: forward head    MOVEMENT LOSS - Lumbar   Norms ROM Loss Initial   Flexion Fingers touch toes, sacral angle >/= 70 deg, uniform spinal curvature, posterior weight shift  minimal loss   Extension ASIS surpasses toes, spine of scapulae surpasses heels, uniform spinal curve moderate loss and burning sensation initially but resolved with repetitive mvmt   Side glide Right  moderate loss   Side glide Left  minimal loss   Rotation Right PT observes contralateral shoulder minimal loss   Rotation Left PT observes contralateral shoulder minimal loss     Lower Extremity Strength  Right LE  Left LE    Hip flexion: 4+/5 Hip flexion: 4+/5   Hip extension:  4-/5 Hip extension: 4-/5 (pain left LB)   Hip abduction: 4/5 Hip abduction: 4/5   Hip adduction:  4+/5 Hip adduction:  4+/5   Hip External Rotation 4/5 Hip External Rotation 4/5   Hip Internal rotation   4+/5 Hip Internal rotation 4+/5   Knee Flexion 4+/5 Knee Flexion 4+/5   Knee Extension 4+/5 Knee Extension 4+/5   Ankle dorsiflexion: 4/5 Ankle dorsiflexion: 4/5   Ankle plantarflexion: 4/5 Ankle plantarflexion: 4/5   Great toe extension 3-/5 Great toe extension 3/5     GAIT:  Assistive Device used: none  Level of Assistance: independent  Patient displays the following gait deviations: decreased step length and increased base of support.     Special Tests:   Test Name  Test Result   Prone Instability Test (+)   SI Joint Provocation Test (--)   Straight Leg Raise (--)   Neural Tension Test (--)   Crossed Straight Leg Raise (--)   Walking on toes Able   Walking on heels  Able     NEUROLOGICAL SCREENING:     Sensory deficits: intact  "to light touch    Reflexes:    Left Right   Patella Tendon 2+ 2+   Achilles Tendon 1+ 1+   Babinski  NT NT   Clonus (--) (--)     REPEATED TEST MOVEMENTS:    Baseline symptoms:  Repeated Flexion in Standing no effect   Repeated Extension in Standing produced  Burning sensation but resolved with repetitive mvmt   Repeated Flexion in lying no effect   Repeated Extension in lying  produced  Burning sensation initially, but resolves       STATIC TESTS and other movements:   Prone lie produced   Prone lie on elbows produced   Sitting slouched  worse   Sitting erect better   Standing slouched no effect   Standing erect  no effect   Lying prone in extension  NT   Long sitting   NT   Sustained flexion worse   Sustained prone using mat NT        Fall Risk  Functional mobility test results suggest the patient is: At Risk for Falls  Four Stage Balance Test                 Single Leg Stand - Right Foot: 3 sec  Single Leg Stand - Left Foot: 2 sec       Sit to Stand Testing  The patient completed 5 sit to stand transfers in 22.49 sec. push off from thighs                  Treatment:     Ibrahima received therapeutic exercises to develop/improve posture, lumbar ROM, strength, and muscular endurance for 10 minutes including the following exercises:     LTR x 10  SKTC x 5  Seated hamstring stretch 20" x 3            6/18/2025    12:16 PM   [facility]   Visit Number 1   VAS Pain Rating 0         Therapeutic Education/Activity provided for 10 minutes:   - Patient was given an [facility] Visit 1 handout which discusses the following:  - what to expect in therapy  - an overview of the program, including health coaching and wellness  - importance of spinal hygiene, proper posture, lifting mechanics, sleep quality, and nutrition/hydration   - Mark roll trialed, recommended, and purchase information was provided.  - Patient received a handout regarding anticipated muscular soreness following the isometric test and " strategies for management were reviewed with patient including stretching, using ice and scheduled rest.   - Patient received verbal education on the following:   - [facility] program   - purpose of the isometric test  - safe progression of lumbar strengthening, wellness approach, and systemic strengthening.   - safe usage of MedX machine and testing protocols.      Time Entry(in minutes):       Assessment & Plan   Assessment  Ibrahima presents with a condition of Moderate complexity.   Presentation of Symptoms: Changing  Will Comorbidities Impact Care: Yes       Functional Limitations: Activity tolerance, Carrying objects, Completing self-care activities, Decreased ambulation distance/endurance, Participating in leisure activities, Disrupted sleep pattern, Functional mobility, Performing household chores, Pain with ADLs/IADLs, Range of motion, Sitting tolerance, Squatting, Standing tolerance  Impairments: Activity intolerance, Impaired physical strength, Pain with functional activity  Personal Factors Affecting Prognosis: Physical limitations    Patient Goal for Therapy (PT): See Patient Specific Functional Scale for 3 goals in FOTO  Prognosis: Fair  Assessment Details: Ibrahima is a 69 y.o. male referred to [facility] with a medical diagnosis of M54.50 (ICD-10-CM) - Lumbar spine pain, M54.41 (ICD-10-CM) - Acute right-sided back pain with sciatica. Pt presents with decreased lumbar range of motion, decreased core and trunk strength, impaired posture, decreased endurance and functional mobility.  Pt notes some burning in lower back with extension but it resolves quickly with repetitive motion into extension.  All of the above noted support potential lumbar classification as a pattern 2 with recurrent/or consistent symptoms, thus patient is a good candidate for the [facility] Program.  Pt would benefit from lower extremity and trunk mobility training, stability training, improved cardiovascular and muscular  endurance, neuromuscular re-education for posture, coordination, and muscular recruitment and education on postural offloading techniques to decrease the intensity and frequency of flare ups.     Plan  From a physical therapy perspective, the patient would benefit from: Skilled Rehab Services    Planned therapy interventions include: Therapeutic exercise, Therapeutic activities, Neuromuscular re-education, Manual therapy, ADLs/IADLs, and Gait training.    Planned modalities to include: Cryotherapy (cold pack) and Electrical stimulation - passive/unattended.        Visit Frequency: 2 times Per Week for 10 Weeks.       This plan was discussed with Patient.   Plan details: Outpatient physical therapy 2x week for 10 weeks or 20 visits to include the following:   - Patient education  - Therapeutic exercise  - Manual therapy  - Performance testing   - Neuromuscular Re-education  - Therapeutic activity   - Modalities    Pt may be seen by PTA as part of the rehabilitation team.          The patient's spiritual, cultural, and educational needs were considered, and the patient is agreeable to the plan of care and goals.     Education  Education was done with Patient. The patient's learning style includes Demonstration, Listening, Pictures/video, and Reading. The patient Demonstrates understanding and Verbalizes understanding.         - PT role and POC - HEP - MedX use        Goals:   Active       long term goals       Pt will demonstrate increased lumbar MedX ROM by at least 6 degrees from initial ROM value, resulting in improved ability to perform functional forward bending while standing and sitting. (Progressing)       Start:  06/18/25    Expected End:  08/27/25            Pt will demonstrate increased MedX average isometric strength value by 35% from initial test resulting in improved ability to perform bending, lifting, and carrying activities safely and confidently. (Progressing)       Start:  06/18/25    Expected End:   08/27/25            Pt to demonstrate ability to independently control and reduce their pain through posture positioning and mechanical movements throughout a typical day. (Progressing)       Start:  06/18/25    Expected End:  08/27/25            Pt will demonstrate reduced pain and improved functional outcomes as reported on the FOTO by reaching an intake score of >/= 61% functional ability in order to demonstrate subjective improvement in patient's condition. . (Progressing)       Start:  06/18/25            Pt will demonstrate independence with the HEP at discharge. (Progressing)       Start:  06/18/25               patient goals       Pt will be able to walk for 30 minutes at moderate pace without increase in low back pain. (Progressing)       Start:  06/18/25               short term goals       Pt will demonstrate increased lumbar MedX ROM by at least 3 degrees from the initial ROM value with improvements noted in functional ROM and ability to perform ADLs.  (Progressing)       Start:  06/18/25    Expected End:  07/30/25            Pt will demonstrate increased MedX average isometric strength value by 15% from initial test resulting in improved ability to perform bending, lifting, and carrying activities safely, confidently. (Progressing)       Start:  06/18/25    Expected End:  07/30/25            Pt will report a reduction in worst pain score by 1-2 points for improved tolerance for walking increased distances.  (Progressing)       Start:  06/18/25    Expected End:  07/30/25            Pt able to perform HEP correctly with minimal cueing or supervision from therapist to encourage independent management of symptoms.  (Progressing)       Start:  06/18/25    Expected End:  08/27/25                Jeanne Ohara, PT

## 2025-06-23 ENCOUNTER — RESULTS FOLLOW-UP (OUTPATIENT)
Dept: PULMONOLOGY | Facility: CLINIC | Age: 70
End: 2025-06-23
Payer: MEDICARE

## 2025-06-23 ENCOUNTER — HOSPITAL ENCOUNTER (OUTPATIENT)
Dept: RADIOLOGY | Facility: HOSPITAL | Age: 70
Discharge: HOME | End: 2025-06-23
Payer: MEDICARE

## 2025-06-23 DIAGNOSIS — J18.9 PNEUMONIA OF RIGHT LUNG DUE TO INFECTIOUS ORGANISM, UNSPECIFIED PART OF LUNG: ICD-10-CM

## 2025-06-23 PROCEDURE — 71046 X-RAY EXAM CHEST 2 VIEWS: CPT | Mod: 26,HCNC,, | Performed by: RADIOLOGY

## 2025-06-23 PROCEDURE — 71046 X-RAY EXAM CHEST 2 VIEWS: CPT | Mod: TC,HCNC

## 2025-06-25 ENCOUNTER — OFFICE VISIT (OUTPATIENT)
Dept: ORTHOPEDICS | Facility: CLINIC | Age: 70
End: 2025-06-25
Payer: MEDICARE

## 2025-06-25 VITALS — HEIGHT: 73 IN | BODY MASS INDEX: 26.76 KG/M2 | WEIGHT: 201.94 LBS

## 2025-06-25 DIAGNOSIS — M54.16 LUMBAR RADICULOPATHY: Primary | ICD-10-CM

## 2025-06-25 PROCEDURE — 1159F MED LIST DOCD IN RCRD: CPT | Mod: CPTII,HCNC,S$GLB, | Performed by: ORTHOPAEDIC SURGERY

## 2025-06-25 PROCEDURE — 1101F PT FALLS ASSESS-DOCD LE1/YR: CPT | Mod: CPTII,HCNC,S$GLB, | Performed by: ORTHOPAEDIC SURGERY

## 2025-06-25 PROCEDURE — 1126F AMNT PAIN NOTED NONE PRSNT: CPT | Mod: CPTII,HCNC,S$GLB, | Performed by: ORTHOPAEDIC SURGERY

## 2025-06-25 PROCEDURE — 1111F DSCHRG MED/CURRENT MED MERGE: CPT | Mod: CPTII,HCNC,S$GLB, | Performed by: ORTHOPAEDIC SURGERY

## 2025-06-25 PROCEDURE — 99999 PR PBB SHADOW E&M-EST. PATIENT-LVL III: CPT | Mod: PBBFAC,HCNC,, | Performed by: ORTHOPAEDIC SURGERY

## 2025-06-25 PROCEDURE — 3008F BODY MASS INDEX DOCD: CPT | Mod: CPTII,HCNC,S$GLB, | Performed by: ORTHOPAEDIC SURGERY

## 2025-06-25 PROCEDURE — 99214 OFFICE O/P EST MOD 30 MIN: CPT | Mod: HCNC,S$GLB,, | Performed by: ORTHOPAEDIC SURGERY

## 2025-06-25 PROCEDURE — 3288F FALL RISK ASSESSMENT DOCD: CPT | Mod: CPTII,HCNC,S$GLB, | Performed by: ORTHOPAEDIC SURGERY

## 2025-06-25 PROCEDURE — 3066F NEPHROPATHY DOC TX: CPT | Mod: CPTII,HCNC,S$GLB, | Performed by: ORTHOPAEDIC SURGERY

## 2025-07-07 ENCOUNTER — HOSPITAL ENCOUNTER (EMERGENCY)
Facility: HOSPITAL | Age: 70
Discharge: HOME | End: 2025-07-07
Attending: EMERGENCY MEDICINE
Payer: MEDICARE

## 2025-07-07 VITALS
WEIGHT: 201.94 LBS | DIASTOLIC BLOOD PRESSURE: 83 MMHG | BODY MASS INDEX: 26.76 KG/M2 | HEART RATE: 70 BPM | OXYGEN SATURATION: 100 % | HEIGHT: 73 IN | RESPIRATION RATE: 18 BRPM | TEMPERATURE: 98 F | SYSTOLIC BLOOD PRESSURE: 180 MMHG

## 2025-07-07 DIAGNOSIS — J44.1 COPD EXACERBATION: Primary | ICD-10-CM

## 2025-07-07 DIAGNOSIS — R94.31 ABNORMAL EKG: ICD-10-CM

## 2025-07-07 DIAGNOSIS — R06.02 SHORTNESS OF BREATH: ICD-10-CM

## 2025-07-07 LAB
ABSOLUTE EOSINOPHIL (OHS): 2.06 K/UL
ABSOLUTE MONOCYTE (OHS): 0.86 K/UL (ref 0.3–1)
ABSOLUTE NEUTROPHIL COUNT (OHS): 5.01 K/UL (ref 1.8–7.7)
ALBUMIN SERPL BCP-MCNC: 3.2 G/DL (ref 3.5–5.2)
ALP SERPL-CCNC: 96 UNIT/L (ref 40–150)
ALT SERPL W/O P-5'-P-CCNC: 18 UNIT/L (ref 10–44)
ANION GAP (OHS): 13 MMOL/L (ref 8–16)
AST SERPL-CCNC: 27 UNIT/L (ref 11–45)
BASOPHILS # BLD AUTO: 0.05 K/UL
BASOPHILS NFR BLD AUTO: 0.5 %
BILIRUB SERPL-MCNC: 0.3 MG/DL (ref 0.1–1)
BNP SERPL-MCNC: 430 PG/ML (ref 0–99)
BUN SERPL-MCNC: 24 MG/DL (ref 8–23)
CALCIUM SERPL-MCNC: 10 MG/DL (ref 8.7–10.5)
CHLORIDE SERPL-SCNC: 104 MMOL/L (ref 95–110)
CO2 SERPL-SCNC: 23 MMOL/L (ref 23–29)
CREAT SERPL-MCNC: 9.2 MG/DL (ref 0.5–1.4)
ERYTHROCYTE [DISTWIDTH] IN BLOOD BY AUTOMATED COUNT: 16.1 % (ref 11.5–14.5)
GFR SERPLBLD CREATININE-BSD FMLA CKD-EPI: 6 ML/MIN/1.73/M2
GLUCOSE SERPL-MCNC: 101 MG/DL (ref 70–110)
HCT VFR BLD AUTO: 39.3 % (ref 40–54)
HGB BLD-MCNC: 12 GM/DL (ref 14–18)
IMM GRANULOCYTES # BLD AUTO: 0.04 K/UL (ref 0–0.04)
IMM GRANULOCYTES NFR BLD AUTO: 0.4 % (ref 0–0.5)
INFLUENZA A MOLECULAR (OHS): NEGATIVE
INFLUENZA B MOLECULAR (OHS): NEGATIVE
LYMPHOCYTES # BLD AUTO: 1.97 K/UL (ref 1–4.8)
MCH RBC QN AUTO: 26.5 PG (ref 27–31)
MCHC RBC AUTO-ENTMCNC: 30.5 G/DL (ref 32–36)
MCV RBC AUTO: 87 FL (ref 82–98)
NUCLEATED RBC (/100WBC) (OHS): 0 /100 WBC
OHS QRS DURATION: 104 MS
OHS QRS DURATION: 104 MS
OHS QTC CALCULATION: 455 MS
OHS QTC CALCULATION: 456 MS
PLATELET # BLD AUTO: 156 K/UL (ref 150–450)
PMV BLD AUTO: 11 FL (ref 9.2–12.9)
POTASSIUM SERPL-SCNC: 4.1 MMOL/L (ref 3.5–5.1)
PROCALCITONIN SERPL-MCNC: 0.9 NG/ML
PROT SERPL-MCNC: 7.7 GM/DL (ref 6–8.4)
RBC # BLD AUTO: 4.53 M/UL (ref 4.6–6.2)
RELATIVE EOSINOPHIL (OHS): 20.6 %
RELATIVE LYMPHOCYTE (OHS): 19.7 % (ref 18–48)
RELATIVE MONOCYTE (OHS): 8.6 % (ref 4–15)
RELATIVE NEUTROPHIL (OHS): 50.2 % (ref 38–73)
SARS-COV-2 RDRP RESP QL NAA+PROBE: NEGATIVE
SODIUM SERPL-SCNC: 140 MMOL/L (ref 136–145)
TROPONIN I SERPL HS-MCNC: 54 NG/L
TROPONIN I SERPL HS-MCNC: 66 NG/L
WBC # BLD AUTO: 9.99 K/UL (ref 3.9–12.7)

## 2025-07-07 PROCEDURE — 93010 ELECTROCARDIOGRAM REPORT: CPT | Mod: HCNC,76,, | Performed by: INTERNAL MEDICINE

## 2025-07-07 PROCEDURE — 25000242 PHARM REV CODE 250 ALT 637 W/ HCPCS: Mod: HCNC | Performed by: EMERGENCY MEDICINE

## 2025-07-07 PROCEDURE — U0002 COVID-19 LAB TEST NON-CDC: HCPCS | Mod: HCNC | Performed by: EMERGENCY MEDICINE

## 2025-07-07 PROCEDURE — 25000003 PHARM REV CODE 250: Mod: HCNC | Performed by: EMERGENCY MEDICINE

## 2025-07-07 PROCEDURE — 83880 ASSAY OF NATRIURETIC PEPTIDE: CPT | Mod: HCNC | Performed by: EMERGENCY MEDICINE

## 2025-07-07 PROCEDURE — 99285 EMERGENCY DEPT VISIT HI MDM: CPT | Mod: 25,HCNC

## 2025-07-07 PROCEDURE — 94640 AIRWAY INHALATION TREATMENT: CPT | Mod: HCNC

## 2025-07-07 PROCEDURE — 63600175 PHARM REV CODE 636 W HCPCS: Mod: HCNC | Performed by: EMERGENCY MEDICINE

## 2025-07-07 PROCEDURE — 87502 INFLUENZA DNA AMP PROBE: CPT | Mod: HCNC | Performed by: EMERGENCY MEDICINE

## 2025-07-07 PROCEDURE — 84145 PROCALCITONIN (PCT): CPT | Mod: HCNC | Performed by: EMERGENCY MEDICINE

## 2025-07-07 PROCEDURE — 84484 ASSAY OF TROPONIN QUANT: CPT | Mod: HCNC | Performed by: EMERGENCY MEDICINE

## 2025-07-07 PROCEDURE — 85025 COMPLETE CBC W/AUTO DIFF WBC: CPT | Mod: HCNC | Performed by: EMERGENCY MEDICINE

## 2025-07-07 PROCEDURE — 94761 N-INVAS EAR/PLS OXIMETRY MLT: CPT | Mod: HCNC

## 2025-07-07 PROCEDURE — 93005 ELECTROCARDIOGRAM TRACING: CPT | Mod: HCNC

## 2025-07-07 PROCEDURE — 80053 COMPREHEN METABOLIC PANEL: CPT | Mod: HCNC | Performed by: EMERGENCY MEDICINE

## 2025-07-07 RX ORDER — AZITHROMYCIN 250 MG/1
250 TABLET, FILM COATED ORAL DAILY
Qty: 6 TABLET | Refills: 0 | Status: SHIPPED | OUTPATIENT
Start: 2025-07-07 | End: 2025-07-10

## 2025-07-07 RX ORDER — IPRATROPIUM BROMIDE AND ALBUTEROL SULFATE 2.5; .5 MG/3ML; MG/3ML
3 SOLUTION RESPIRATORY (INHALATION)
Status: COMPLETED | OUTPATIENT
Start: 2025-07-07 | End: 2025-07-07

## 2025-07-07 RX ORDER — PREDNISONE 50 MG/1
50 TABLET ORAL DAILY
Qty: 5 TABLET | Refills: 0 | Status: SHIPPED | OUTPATIENT
Start: 2025-07-07 | End: 2025-07-12

## 2025-07-07 RX ORDER — ASPIRIN 325 MG
325 TABLET ORAL
Status: COMPLETED | OUTPATIENT
Start: 2025-07-07 | End: 2025-07-07

## 2025-07-07 RX ORDER — PREDNISONE 20 MG/1
60 TABLET ORAL
Status: COMPLETED | OUTPATIENT
Start: 2025-07-07 | End: 2025-07-07

## 2025-07-07 RX ADMIN — IPRATROPIUM BROMIDE AND ALBUTEROL SULFATE 3 ML: .5; 3 SOLUTION RESPIRATORY (INHALATION) at 02:07

## 2025-07-07 RX ADMIN — PREDNISONE 60 MG: 20 TABLET ORAL at 02:07

## 2025-07-07 RX ADMIN — ASPIRIN 325 MG: 325 TABLET ORAL at 02:07

## 2025-07-07 NOTE — DISCHARGE INSTRUCTIONS
You were seen in the emergency department for difficulty breathing related to COPD.    Diagnosis: COPD Exacerbation    Return to the emergency department if you have signs of severe difficulty breathing including:   - Breathing too fast  - Not able to say more than 2-3 words at a time without taking a breath  - Blue-viviana or gray/dusky color of the face, lips or fingers  - Muscles pulling in around the neck or ribs    Return to the emergency department at any time if you think that you are getting worse.    Tests today showed:   Labs Reviewed   COMPREHENSIVE METABOLIC PANEL - Abnormal       Result Value    Sodium 140      Potassium 4.1      Chloride 104      CO2 23      Glucose 101      BUN 24 (*)     Creatinine 9.2 (*)     Calcium 10.0      Protein Total 7.7      Albumin 3.2 (*)     Bilirubin Total 0.3      ALP 96      AST 27      ALT 18      Anion Gap 13      eGFR 6 (*)    TROPONIN I HIGH SENSITIVITY - Abnormal    Troponin High Sensitive 66 (*)    B-TYPE NATRIURETIC PEPTIDE - Abnormal     (*)    PROCALCITONIN - Abnormal    Procalcitonin 0.90 (*)    CBC WITH DIFFERENTIAL - Abnormal    WBC 9.99      RBC 4.53 (*)     HGB 12.0 (*)     HCT 39.3 (*)     MCV 87      MCH 26.5 (*)     MCHC 30.5 (*)     RDW 16.1 (*)     Platelet Count 156      MPV 11.0      Nucleated RBC 0      Neut % 50.2      Lymph % 19.7      Mono % 8.6      Eos % 20.6 (*)     Basophil % 0.5      Imm Grans % 0.4      Neut # 5.01      Lymph # 1.97      Mono # 0.86      Eos # 2.06 (*)     Baso # 0.05      Imm Grans # 0.04      Narrative:     This is an appended report.  These results have been appended to a previously verified report.   TROPONIN I HIGH SENSITIVITY - Abnormal    Troponin High Sensitive 54 (*)    INFLUENZA A & B BY MOLECULAR - Normal    INFLUENZA A MOLECULAR Negative      INFLUENZA B MOLECULAR  Negative     SARS-COV-2 RNA AMPLIFICATION, QUAL - Normal    SARS COV-2 Molecular Negative     CBC W/ AUTO DIFFERENTIAL    Narrative:     The  following orders were created for panel order CBC auto differential.  Procedure                               Abnormality         Status                     ---------                               -----------         ------                     CBC with Differential[[id number]]       Abnormal            Final result                 Please view results for these tests on the individual orders.     X-Ray Chest PA And Lateral    (Results Pending)       Treatments you had today:   Medications   albuterol-ipratropium 2.5 mg-0.5 mg/3 mL nebulizer solution 3 mL (3 mLs Nebulization Given 7/7/25 0256)   predniSONE tablet 60 mg (60 mg Oral Given 7/7/25 0256)   aspirin tablet 325 mg (325 mg Oral Given 7/7/25 0255)       Home Care Instructions:  - Take albuterol (inhaler 2 - 4 puffs or nebulizer) every 4 hours as needed for wheezing or difficulty breathing  - Take the prescribed steroid as directed  - Continue taking other home medications as previously prescribed    Follow-Up Plan:  - Follow-up with: Primary care doctor within 2 - 3 days  - Additional outpatient testing and/or evaluation as directed by your doctor  - Follow up with your pulmonologist, or establish care with one.

## 2025-07-07 NOTE — ED PROVIDER NOTES
Source of History:  Patient  Chart    Chief complaint:  Cough (Nonproductive cough for 1 week denies any sob)      HPI:  Ibrahima Phelps is a 69 y.o. male with history of CAD s/p PCI 2006, PCI to RCA w/ 2 DIEGO 2/28/2024 , ESRD on HD TTS, s/p failed kidney transplant, paroxysmal afib (amio, Eliquis), COPD, pulmonary HTN, tachy-loan syndrome presenting to emergency department with complaint of cough.    Per chart review, patient was hospitalized 2 days last month for pneumonia.  He presented to the ED with cough, shortness of breath, fevers, generalized weakness.  He was febrile in the emergency department, had leukocytosis to 13,000, bicarb 18.  Lactate was elevated at 2.3.  Flu negative.  Chest x-ray showed consolidation of mid and upper right lung suggestive of right-sided pneumonia.  Troponin was elevated and up trending.  He was started on the ACS protocol and seen by Cardiology.  Echo ordered.  Interventional cardiology consulted for ACS rule out.  This is all thought to be demand ischemia due to sepsis and ACS protocol was stopped.  Recommended outpatient stress test.  Discharge summary says he will also need an echo.    Patient states that he has a nonproductive cough for 1 week.  He went to urgent care and was prescribed amoxicillin which she has been taking as directed.  No fevers.  Has chest pain when he coughs.  No shortness of breath.  No loss of consciousness.  He has been using his home metered-dose inhaler, 1 puff, as needed.  Not currently on steroids.  He is taking his medications as directed.  No vomiting.  No abdominal pain.  He attended his last dialysis session on Saturday and received a full session.    Patient and his wife state that they were not aware of the recommendation for outpatient stress test and echocardiogram.  This is not currently scheduled.    Review of patient's allergies indicates:   Allergen Reactions    Ace inhibitors Swelling    Iodine Itching    Verapamil Other (See Comments)  "    Other reaction(s): Unknown    Povidone-iodine Itching       Medications Ordered Prior to Encounter[1]    PMH:  As per HPI and below:  Past Medical History:   Diagnosis Date    Atrial fibrillation     CAD (coronary artery disease) 2006    MI in 2006    CHF (congestive heart failure) 2017    CKD (chronic kidney disease) stage 3, GFR 30-59 ml/min     Dr. Latif.  in transplanted kidney    CKD (chronic kidney disease) stage 5, GFR less than 15 ml/min 02/02/2024    COVID-19 02/07/2023    Diverticulosis     Hyperlipidemia     Hypertension     Keloid cicatrix     Metabolic bone disease     Other emphysema 10/01/2019    Pericarditis     S/P kidney transplant 1992    x2 (1992 & 2005),    Thrombocytopenia     Thyroid disease     Tobacco use 09/05/2014     Past Surgical History:   Procedure Laterality Date    AV FISTULA PLACEMENT Left 12/18/2023    Procedure: CREATION, AV FISTULA;  Surgeon: JUANI Melendez III, MD;  Location: [facility] OR 2ND FLR;  Service: Vascular;  Laterality: Left;  LUE AVF creation vs AVG insertion    CARDIOVERSION  04/30/15    CHOLECYSTECTOMY      COLONOSCOPY  April 20, 2011    Diverticulosis, repeat recommended in 3 yrs., repeat colonoscopy 2014 revealed 2 polyps.  He should return in 5 years.    COLONOSCOPY N/A 3/13/2020    Procedure: COLONOSCOPY;  Surgeon: Chon Casper MD;  Location: [facility] (4TH FLR);  Service: Endoscopy;  Laterality: N/A;  ok to hold coumadin x5days-see telephone encounter 2/4/20-tb    COLONOSCOPY N/A 2/4/2021    Procedure: COLONOSCOPY;  Surgeon: Chon Casper MD;  Location: [facility] (4TH FLR);  Service: Endoscopy;  Laterality: N/A;  Eliquis - per Dr. GAVIN Blunt ok to hold x 2 days and "restarted asap"- ERW  last prep "poor", [id number] ordered/ Pt requests Dr. Casper  prep ins. mailed - COVID screening on 2/1/21 PCW- ERW    COLONOSCOPY N/A 3/3/2021    Procedure: COLONOSCOPY;  Surgeon: Chon Casper MD;  Location: [facility] (4TH FLR);  Service: Endoscopy;  Laterality: " N/A;  Patient with his second poor bowel pre and has poor prep today.  If patient not intersted or can't get colonoscopy tomorrow will need constipation bowel prep and will need to restart his Eliquis today.Thanks,Chon     per Dr Majorok to hold Eliquis 2 days prior      COVID     COLONOSCOPY N/A 12/6/2024    Procedure: COLONOSCOPY;  Surgeon: Chon Casper MD;  Location: [facility] (2ND FLR);  Service: Endoscopy;  Laterality: N/A;  Plavix-Eliquis pending/ HD T-Th-Sat- lab ordered  2/25/24 ECHO PA 49  ref by / [facility] portal-RN  11/29-message to clearance nurse for plavix and eliquis hold-tb  ok to hold Eliquis 2 days per Dr Parmar  ok to hold Plavix 5 days per MEENA CUNNINGHAM-GT  12/2- p    CORONARY ANGIOGRAPHY N/A 2/28/2024    Procedure: ANGIOGRAM, CORONARY ARTERY;  Surgeon: Tylor Lincoln MD;  Location: [facility] CATH LAB;  Service: Cardiology;  Laterality: N/A;    CORONARY ANGIOPLASTY WITH STENT PLACEMENT  9/13/2006    FISTULOGRAM N/A 2/19/2024    Procedure: Fistulogram;  Surgeon: JUANI Melendez III, MD;  Location: [facility] CATH LAB;  Service: Peripheral Vascular;  Laterality: N/A;    FISTULOGRAM, WITH PTA Left 6/3/2024    Procedure: FISTULOGRAM, WITH PTA;  Surgeon: JUANI Melendez III, MD;  Location: [facility] OR [facility] FLR;  Service: Vascular;  Laterality: Left;  mGy: 51.58  GyCm2: 6.8285  Fluoro time: 5.1 min  Contrast: 28 cc    FISTULOGRAM, WITH PTA Left 3/5/2025    Procedure: FISTULOGRAM, WITH PTA;  Surgeon: JUANI Melendez III, MD;  Location: [facility] OR [facility] FLR;  Service: Vascular;  Laterality: Left;    IRRIGATION AND DEBRIDEMENT Right 8/30/2023    Procedure: IRRIGATION AND DEBRIDEMENT, RIGHT MIDDLE FINGER;  Surgeon: Martin Larsen MD;  [facility];  Service: Orthopedics;  Laterality: Right;    KIDNEY TRANSPLANT  2005    LEFT HEART CATHETERIZATION N/A 2/26/2024    Procedure: Left heart cath;  Surgeon: Tylor Lincoln MD;  Location: [facility] CATH LAB;  Service: Cardiology;   Laterality: N/A;    PARATHYROIDECTOMY      PERCUTANEOUS TRANSLUMINAL ANGIOPLASTY OF ARTERIOVENOUS FISTULA Left 2/19/2024    Procedure: PTA, AV FISTULA;  Surgeon: JUANI Melendez III, MD;  Location: [facility] CATH LAB;  Service: Peripheral Vascular;  Laterality: Left;    STENT, DRUG ELUTING, SINGLE VESSEL, CORONARY N/A 2/28/2024    Procedure: Stent, Drug Eluting, Single Vessel, Coronary;  Surgeon: Tylor Lincoln MD;  Location: [facility] CATH LAB;  Service: Cardiology;  Laterality: N/A;    STENT, PERIPHERAL GRAFT Left 3/5/2025    Procedure: STENT, PERIPHERAL GRAFT;  Surgeon: JUANI Melendez III, MD;  Location: [facility] OR [facility] FLR;  Service: Vascular;  Laterality: Left;  mGy 63.17  Gycm2 8.3386  flouro 63.17 min  contrast 38 ml    TREATMENT OF CARDIAC ARRHYTHMIA N/A 3/28/2022    Procedure: CARDIOVERSION;  Surgeon: Migue Blunt MD;  Location: [facility] EP LAB;  Service: Cardiology;  Laterality: N/A;  AF, DCC, MAC, GP, 3 PREP*RODRIGO deferred pt 100% compliant*    VENOPLASTY  6/3/2024    Procedure: ANGIOPLASTY, VEIN;  Surgeon: JUANI Melendez III, MD;  Location: [facility] OR 2ND FLR;  Service: Vascular;;  Left subclavian       Social History     Socioeconomic History    Marital status:    Occupational History    Occupation:    Tobacco Use    Smoking status: Former     Current packs/day: 0.00     Average packs/day: 1 pack/day for 54.2 years (54.2 ttl pk-yrs)     Types: Cigarettes     Start date: 1968     Quit date: 2/28/2022     Years since quitting: 3.3    Smokeless tobacco: Never    Tobacco comments:     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     Patient is currently retired   Substance and Sexual Activity    Alcohol use: No    Drug use: No    Sexual activity: Yes     Partners: Female   Social History Narrative    Retired      Social Drivers of Health     Financial Resource Strain: Low Risk  (6/8/2025)    Overall Financial Resource Strain (CARDIA)     Difficulty of  Paying Living Expenses: Not very hard   Food Insecurity: No Food Insecurity (6/8/2025)    Hunger Vital Sign     Worried About Running Out of Food in the Last Year: Never true     Ran Out of Food in the Last Year: Never true   Transportation Needs: No Transportation Needs (6/8/2025)    PRAPARE - Transportation     Lack of Transportation (Medical): No     Lack of Transportation (Non-Medical): No   Physical Activity: Inactive (5/21/2025)    Exercise Vital Sign     Days of Exercise per Week: 0 days     Minutes of Exercise per Session: 0 min   Stress: No Stress Concern Present (5/21/2025)    [employer] [place] [employer] - Occupational Stress Questionnaire     Feeling of Stress : Not at all   Housing Stability: Low Risk  (6/8/2025)    Housing Stability Vital Sign     Unable to Pay for Housing in the Last Year: No     Number of Times Moved in the Last Year: 0     Homeless in the Last Year: No       Family History   Problem Relation Name Age of Onset    No Known Problems Sister      No Known Problems Brother      Thyroid disease Mother          s/p surgery    Heart disease Father          had pacemaker    No Known Problems Sister      Kidney failure Sister      Kidney disease Sister      No Known Problems Sister      Kidney disease Brother      Kidney failure Brother          s/p transplant    Diabetes Mellitus Neg Hx      Stroke Neg Hx      Heart attack Neg Hx      Cancer Neg Hx      Celiac disease Neg Hx      Cirrhosis Neg Hx      Colon cancer Neg Hx      Esophageal cancer Neg Hx      Inflammatory bowel disease Neg Hx      Rectal cancer Neg Hx      Stomach cancer Neg Hx      Ulcerative colitis Neg Hx      Liver disease Neg Hx      Liver cancer Neg Hx      Crohn's disease Neg Hx      Melanoma Neg Hx         Physical Exam:      Vitals:    07/07/25 0256   BP:    Pulse: 70   Resp: 18   Temp:      Gen: No acute distress.  Nontoxic.  Well appearing.  Mental Status:  Alert and oriented.  Appropriate,  conversant.  Skin: Warm, dry. No rashes seen.  Eyes: No conjunctival injection.  Pulm: CTAB. No increased work of breathing.  No significant tachypnea.  No audible stridor or wheezing.  No conversational dyspnea.    CV: Regular rate. Regular rhythm.  No lower extremity edema.  Abd: Soft.  Not distended.  Nontender.   MSK: Good range of motion all joints.  No deformities.    Neuro: Awake. Speech normal. No focal neuro deficit observed.    Laboratory Studies:  Labs Reviewed   COMPREHENSIVE METABOLIC PANEL - Abnormal       Result Value    Sodium 140      Potassium 4.1      Chloride 104      CO2 23      Glucose 101      BUN 24 (*)     Creatinine 9.2 (*)     Calcium 10.0      Protein Total 7.7      Albumin 3.2 (*)     Bilirubin Total 0.3      ALP 96      AST 27      ALT 18      Anion Gap 13      eGFR 6 (*)    TROPONIN I HIGH SENSITIVITY - Abnormal    Troponin High Sensitive 66 (*)    B-TYPE NATRIURETIC PEPTIDE - Abnormal     (*)    PROCALCITONIN - Abnormal    Procalcitonin 0.90 (*)    CBC WITH DIFFERENTIAL - Abnormal    WBC 9.99      RBC 4.53 (*)     HGB 12.0 (*)     HCT 39.3 (*)     MCV 87      MCH 26.5 (*)     MCHC 30.5 (*)     RDW 16.1 (*)     Platelet Count 156      MPV 11.0      Nucleated RBC 0      Neut % 50.2      Lymph % 19.7      Mono % 8.6      Eos % 20.6 (*)     Basophil % 0.5      Imm Grans % 0.4      Neut # 5.01      Lymph # 1.97      Mono # 0.86      Eos # 2.06 (*)     Baso # 0.05      Imm Grans # 0.04      Narrative:     This is an appended report.  These results have been appended to a previously verified report.   TROPONIN I HIGH SENSITIVITY - Abnormal    Troponin High Sensitive 54 (*)    INFLUENZA A & B BY MOLECULAR - Normal    INFLUENZA A MOLECULAR Negative      INFLUENZA B MOLECULAR  Negative     SARS-COV-2 RNA AMPLIFICATION, QUAL - Normal    SARS COV-2 Molecular Negative     CBC W/ AUTO DIFFERENTIAL    Narrative:     The following orders were created for panel order CBC auto differential.                   Procedure                               Abnormality         Status                                     ---------                               -----------         ------                                     CBC with Differential[[id number]]       Abnormal            Final result                                                 Please view results for these tests on the individual orders.       EKG (independently interpreted by me):  Normal sinus rhythm, no STEMI.    X-rays (independently interpreted by me):  No acute abnormality    Chart reviewed.     Imaging Results              X-Ray Chest PA And Lateral (In process)                     Medications Given:  Medications   albuterol-ipratropium 2.5 mg-0.5 mg/3 mL nebulizer solution 3 mL (3 mLs Nebulization Given 7/7/25 0256)   predniSONE tablet 60 mg (60 mg Oral Given 7/7/25 0256)   aspirin tablet 325 mg (325 mg Oral Given 7/7/25 0255)       MDM:    69 y.o. male with history of COPD presenting to emergency department with complaint of cough.  Patient is afebrile and hemodynamically stable.  No hypoxia or increased work of breathing.  He does have a bronchospastic cough present but good air entry in all lung fields.      He received a DuoNeb and dose of steroids.  EKG, labs, chest x-ray obtained.    EKG read, no STEMI.  Chest x-ray unremarkable.  Labs reviewed.  No leukocytosis.  Hemoglobin 12.0.  CMP remarkable for creatinine of 9.2.  Normal potassium.  BNP is 430, improved from prior.  High sensitivity troponin is 66, much improved from prior.  Procalcitonin 0.9.  COVID and flu negative.    Patient reassessed, states feels better after DuoNeb and steroids.  I explained findings so far, patient and wife are comfortable waiting for repeat troponin.    I also discussed the discharge summary from the prior hospitalization that states that patient should follow up outpatient for stress test and echocardiogram.  Patient and his wife were not aware of  this.    Repeat troponin is stable/downtrending.  Patient and wife updated.  I offered them observation stay to obtain stress test and echocardiogram, and ongoing monitoring of his COPD exacerbation, but they declined.  They would prefer to follow up with patient's primary care doctor and/or cardiologist.  Prescription for steroids and Z-Conor sent to pharmacy.  He has albuterol at home.  Discharged in stable condition.  Return precautions discussed at bedside.    Diagnostic Impression:    1. COPD exacerbation    2. Shortness of breath    3. Abnormal EKG         ED Disposition Condition    Discharge Stable          ED Prescriptions       Medication Sig Dispense Start Date End Date Auth. Provider    predniSONE (DELTASONE) 50 MG Tab Take 1 tablet (50 mg total) by mouth once daily. for 5 days 5 tablet 7/7/2025 7/12/2025 Osiris Poon MD    azithromycin (Z-CONOR) 250 MG tablet Take first 2 tablets together ON DAY 1, then 1 every day until finished (days 2-5). 6 tablet 7/7/2025 -- Osiris Poon MD          Follow-up Information       Follow up With Specialties Details Why Contact Info Additional Information    Anatoliy Colindres MD Family Medicine Schedule an appointment as soon as possible for a visit   [address]  [place] [address]  [phone]       [facility] - Cardiology - [facility] Cardiology Schedule an appointment as soon as possible for a visit   [address] [facility] [id number] [phone] Cardiology Services Clinics - 3rd floor            Patient understands the plan and is in agreement, verbalized understanding, questions answered    Osiris Poon MD  Emergency Medicine           [1]   No current facility-administered medications on file prior to encounter.     Current Outpatient Medications on File Prior to Encounter   Medication Sig Dispense Refill    acetaminophen (TYLENOL) 500 MG tablet Take 1 tablet (500 mg total) by mouth every 6 (six) hours as needed for Pain. 20  tablet 0    albuterol (VENTOLIN HFA) 90 mcg/actuation inhaler Inhale 2 puffs into the lungs every 6 (six) hours as needed for Wheezing or Shortness of Breath. Rescue 18 g 3    albuterol-ipratropium (DUO-NEB) 2.5 mg-0.5 mg/3 mL nebulizer solution Take 3 mLs by nebulization every 4 (four) hours as needed for Wheezing or Shortness of Breath. Rescue 120 mL 0    amiodarone (PACERONE) 200 MG Tab TAKE 1 TABLET BY MOUTH EVERY DAY 90 tablet 3    atorvastatin (LIPITOR) 80 MG tablet Take 1 tablet (80 mg total) by mouth once daily. 90 tablet 1    b complex vitamins (B COMPLEX-VITAMIN B12) tablet Take 1 tablet by mouth once daily. 90 tablet 3    clopidogreL (PLAVIX) 75 mg tablet Take 1 tablet (75 mg total) by mouth once daily. 90 tablet 3    ELIQUIS 5 mg Tab TAKE 1 TABLET BY MOUTH TWICE A DAY 60 tablet 32    famotidine (PEPCID) 20 MG tablet Take 1 tablet (20 mg total) by mouth once daily. On dialysis days, please take this medicine after dialysis. Only taking on Tue, Thur, and  Sat. 12 tablet 3    fexofenadine HCl (ALLEGRA ORAL) Take 1 tablet by mouth daily as needed (allergies).      fluticasone furoate-vilanteroL (BREO) 100-25 mcg/dose diskus inhaler Inhale 1 puff into the lungs once daily. Controller. Use this inhaler every day.      fluticasone propionate (FLONASE) 50 mcg/actuation nasal spray 1 spray (50 mcg total) by Each Nostril route daily as needed for Allergies.      gabapentin (NEURONTIN) 300 MG capsule Take 1 capsule (300 mg total) by mouth every evening. 90 capsule 3    ipratropium (ATROVENT) 21 mcg (0.03 %) nasal spray 2 sprays by Each Nostril route 3 (three) times daily. 30 mL 0    midodrine (PROAMATINE) 5 MG Tab Take 1 tablet (5 mg total) by mouth 3 (three) times daily as needed (if blood pressure less than 100/60 mmHg). 90 tablet 0    multivit-min-FA-lycopen-lutein (CENTRUM SILVER) 0.4 mg-300 mcg- 250 mcg Tab Take 1 tablet by mouth once daily.      nitroGLYCERIN (NITROSTAT) 0.4 MG SL tablet Place 1 tablet (0.4  mg total) under the tongue every 5 (five) minutes as needed for Chest pain. 25 tablet 0    omeprazole (PRILOSEC) 20 MG capsule Take 1 capsule (20 mg total) by mouth daily as needed (heartburn).      pulse oximeter (PULSE OXIMETER) device by Apply Externally route 2 (two) times a day. Use twice daily at 8 AM and 3 PM and record the value in MyChart as directed. 1 each 0    sevelamer carbonate (RENVELA) 800 mg Tab This medicine is used to treat your phosphorus level. Please take 1 tablet (800 mg) once a day with the largest meal of the day. 30 tablet 11    torsemide (DEMADEX) 20 MG Tab Take 2 tablets (40 mg total) by mouth 3 (three) times a week. 90 tablet 3        Osiris Poon MD  07/09/25 2586

## 2025-07-08 ENCOUNTER — PATIENT OUTREACH (OUTPATIENT)
Facility: OTHER | Age: 70
End: 2025-07-08
Payer: MEDICARE

## 2025-07-08 NOTE — PROGRESS NOTES
Patient was seen in the ED on 7/7/525. Phoned patient to assist with Post ED Discharge Navigation. Patient has a scheduled PCP appointment. Reminded patient of upcoming appointment on 7/10/25 with Dr Anatoliy Colindres MD [facility], Primary Care [address] [place] [phone]. Patient inquired about [facility]. Provided locations. Patient declined additional assistance.  Yoandy Brunson

## 2025-07-10 ENCOUNTER — OFFICE VISIT (OUTPATIENT)
Dept: CARDIOLOGY | Facility: CLINIC | Age: 70
End: 2025-07-10
Payer: MEDICARE

## 2025-07-10 ENCOUNTER — OFFICE VISIT (OUTPATIENT)
Dept: INTERNAL MEDICINE | Facility: CLINIC | Age: 70
End: 2025-07-10
Payer: MEDICARE

## 2025-07-10 VITALS
BODY MASS INDEX: 26.91 KG/M2 | WEIGHT: 203.06 LBS | HEART RATE: 58 BPM | DIASTOLIC BLOOD PRESSURE: 62 MMHG | HEIGHT: 73 IN | SYSTOLIC BLOOD PRESSURE: 118 MMHG

## 2025-07-10 VITALS
HEART RATE: 57 BPM | BODY MASS INDEX: 27.26 KG/M2 | WEIGHT: 205.69 LBS | DIASTOLIC BLOOD PRESSURE: 62 MMHG | SYSTOLIC BLOOD PRESSURE: 120 MMHG | OXYGEN SATURATION: 99 % | HEIGHT: 73 IN

## 2025-07-10 DIAGNOSIS — Z87.891 FORMER SMOKER: ICD-10-CM

## 2025-07-10 DIAGNOSIS — I25.2 HISTORY OF NON-ST ELEVATION MYOCARDIAL INFARCTION (NSTEMI): ICD-10-CM

## 2025-07-10 DIAGNOSIS — J44.9 CHRONIC OBSTRUCTIVE PULMONARY DISEASE, UNSPECIFIED COPD TYPE: Chronic | ICD-10-CM

## 2025-07-10 DIAGNOSIS — I13.0 HYPERTENSIVE HEART AND RENAL DISEASE WITH RENAL FAILURE, STAGE 1 THROUGH STAGE 4 OR UNSPECIFIED CHRONIC KIDNEY DISEASE, WITH HEART FAILURE: ICD-10-CM

## 2025-07-10 DIAGNOSIS — I25.10 CORONARY ARTERY DISEASE INVOLVING NATIVE CORONARY ARTERY OF NATIVE HEART WITHOUT ANGINA PECTORIS: Primary | Chronic | ICD-10-CM

## 2025-07-10 DIAGNOSIS — I27.20 PULMONARY HYPERTENSION: ICD-10-CM

## 2025-07-10 DIAGNOSIS — R79.89 ELEVATED TROPONIN: ICD-10-CM

## 2025-07-10 DIAGNOSIS — N18.6 ESRD (END STAGE RENAL DISEASE) ON DIALYSIS: Chronic | ICD-10-CM

## 2025-07-10 DIAGNOSIS — Z12.5 SCREENING PSA (PROSTATE SPECIFIC ANTIGEN): ICD-10-CM

## 2025-07-10 DIAGNOSIS — R42 ORTHOSTATIC DIZZINESS: ICD-10-CM

## 2025-07-10 DIAGNOSIS — I25.2 OLD MYOCARDIAL INFARCTION: ICD-10-CM

## 2025-07-10 DIAGNOSIS — I65.29 STENOSIS OF CAROTID ARTERY, UNSPECIFIED LATERALITY: Chronic | ICD-10-CM

## 2025-07-10 DIAGNOSIS — I25.10 ATHEROSCLEROSIS OF NATIVE CORONARY ARTERY OF NATIVE HEART WITHOUT ANGINA PECTORIS: ICD-10-CM

## 2025-07-10 DIAGNOSIS — Z99.2 ESRD (END STAGE RENAL DISEASE) ON DIALYSIS: Chronic | ICD-10-CM

## 2025-07-10 DIAGNOSIS — I48.0 PAROXYSMAL ATRIAL FIBRILLATION: Chronic | ICD-10-CM

## 2025-07-10 DIAGNOSIS — I10 PRIMARY HYPERTENSION: ICD-10-CM

## 2025-07-10 DIAGNOSIS — R09.89 CAROTID BRUIT, UNSPECIFIED LATERALITY: ICD-10-CM

## 2025-07-10 DIAGNOSIS — Z95.5 HISTORY OF CORONARY ANGIOPLASTY WITH INSERTION OF STENT: ICD-10-CM

## 2025-07-10 DIAGNOSIS — I10 PRIMARY HYPERTENSION: Primary | ICD-10-CM

## 2025-07-10 DIAGNOSIS — E78.2 MIXED HYPERLIPIDEMIA: Chronic | ICD-10-CM

## 2025-07-10 DIAGNOSIS — Z94.0 H/O KIDNEY TRANSPLANT: Chronic | ICD-10-CM

## 2025-07-10 PROCEDURE — 1159F MED LIST DOCD IN RCRD: CPT | Mod: CPTII,HCNC,S$GLB, | Performed by: STUDENT IN AN ORGANIZED HEALTH CARE EDUCATION/TRAINING PROGRAM

## 2025-07-10 PROCEDURE — 99999 PR PBB SHADOW E&M-EST. PATIENT-LVL IV: CPT | Mod: PBBFAC,HCNC,, | Performed by: STUDENT IN AN ORGANIZED HEALTH CARE EDUCATION/TRAINING PROGRAM

## 2025-07-10 PROCEDURE — 3066F NEPHROPATHY DOC TX: CPT | Mod: CPTII,HCNC,S$GLB, | Performed by: STUDENT IN AN ORGANIZED HEALTH CARE EDUCATION/TRAINING PROGRAM

## 2025-07-10 PROCEDURE — 3078F DIAST BP <80 MM HG: CPT | Mod: CPTII,HCNC,S$GLB, | Performed by: STUDENT IN AN ORGANIZED HEALTH CARE EDUCATION/TRAINING PROGRAM

## 2025-07-10 PROCEDURE — 1101F PT FALLS ASSESS-DOCD LE1/YR: CPT | Mod: CPTII,HCNC,S$GLB, | Performed by: STUDENT IN AN ORGANIZED HEALTH CARE EDUCATION/TRAINING PROGRAM

## 2025-07-10 PROCEDURE — 99999 PR PBB SHADOW E&M-EST. PATIENT-LVL IV: CPT | Mod: PBBFAC,HCNC,, | Performed by: INTERNAL MEDICINE

## 2025-07-10 PROCEDURE — 1160F RVW MEDS BY RX/DR IN RCRD: CPT | Mod: CPTII,HCNC,S$GLB, | Performed by: STUDENT IN AN ORGANIZED HEALTH CARE EDUCATION/TRAINING PROGRAM

## 2025-07-10 PROCEDURE — 1126F AMNT PAIN NOTED NONE PRSNT: CPT | Mod: CPTII,HCNC,S$GLB, | Performed by: STUDENT IN AN ORGANIZED HEALTH CARE EDUCATION/TRAINING PROGRAM

## 2025-07-10 PROCEDURE — 3074F SYST BP LT 130 MM HG: CPT | Mod: CPTII,HCNC,S$GLB, | Performed by: STUDENT IN AN ORGANIZED HEALTH CARE EDUCATION/TRAINING PROGRAM

## 2025-07-10 PROCEDURE — 99214 OFFICE O/P EST MOD 30 MIN: CPT | Mod: HCNC,S$GLB,, | Performed by: STUDENT IN AN ORGANIZED HEALTH CARE EDUCATION/TRAINING PROGRAM

## 2025-07-10 PROCEDURE — 3008F BODY MASS INDEX DOCD: CPT | Mod: CPTII,HCNC,S$GLB, | Performed by: STUDENT IN AN ORGANIZED HEALTH CARE EDUCATION/TRAINING PROGRAM

## 2025-07-10 PROCEDURE — 3288F FALL RISK ASSESSMENT DOCD: CPT | Mod: CPTII,HCNC,S$GLB, | Performed by: STUDENT IN AN ORGANIZED HEALTH CARE EDUCATION/TRAINING PROGRAM

## 2025-07-10 NOTE — PROGRESS NOTES
"[facility]      Chief Complaint   Patient presents with    Primary hypertension       Patient ID: Ibrahima Phelps is a 69 y.o. male with CAD s/p PCI 2006, PCI to RCA w/ 2 DIEGO 2/28/2024, paroxysmal Afib (on amiodarone and Eliquis), HFpEF, PAD, S/P kidney transplant on immunosuppressive therapy with Tacrolimus and prednisone, smoking, who presents for a follow up appointment.  Pertinent history/events are as follows:     -At our initial clinic visit on 7/20/2023, Mr. Phelps reports SOB on exertion which started in 12/2022.  He  reports no chest pain or chest discomfort.  He was recently started on amiodarone for Afib.  Smokes 5 cigarettes a day for 53 years.  EKG today shows sinus bradycardia with Premature atrial complexes with Aberrant conduction/LVH with repolarization abnormality.  Plan:  SOB on Exertion- Appears chronic based on chart review.  Pt with normal nuclear stress test in 4/2022.  Check PET stress test to rule out myocardial ischemia.  Paroxysmal Afib- Continue amiodarone and Eliquis.  HFpEF- Stable.  Check PET stress test to rule out myocardial ischemia.  Smoking- Encourage smoking cessation.  Overweight- Encourage diet, exercise, and weight loss.   Snoring- Check sleep study of evaluate for TONYA.     -Pt admitted 12/6-12/7/2024 with chest pain (details below as per discharge summary):  Hospital Course:   "69 y.o. male with CAD s/p stent placements (1 stent placed in 2006, 2 stents placed in 02/2024), CKD s/p renal transplant (in 1992) on hemodialysis (TTS), HTN, CHF presents to the ED w/ complaints of left sided pressure like CP that resolved with nitro. EKG (sinus bradycardia without ST/T wave changes) non ischemic without CP recurrence on admission. CXR normal. Cardiology consulted for elevated troponin and recommended to restart AC, Plavix and Statin . Cardiology initially recommended to start patient on imdur 30 mg daily but later recommended to not prescribe on discharge and to discuss " "with outpatient cardiology and nephrology teams as BP has been low at home per patient and family. Bedside TTE at baseline no WMA, formal echo with EF 60-65%. Patient to benefit from cardiology follow up as OP.    -TTE 12/7  with EF 60 - 65%. Diastolic function cannot be reliably determined in the presence of mitral annular calcification. LA severely dilated, RA moderately dilated. mild pHTn with PASP 38 mmHg"    -At follow up clinic visit on 12/9/2024, Mr. Phelps reported feeling better since hospital discharge. He has no chest pain or SOB. States blood pressure has been low at home at 111/53. Blood pressure in clinic today is 120/49.  States due to low blood pressures, he is not currently taking metoprolol succinate 25 mg daily or nifedipine 30 mg daily.  Plan:  CAD s/p PCI 2006, PCI to RCA w/ 2 DIEGO 2/28/2024- Stable with no angina currently. Holding off on start imdur due to low blood pressure. Continue Eliquis 5 mg bid, plavix 75 mg daily, and atorvastatin 80 mg daily.   Paroxysmal Afib- Continue amiodarone and Eliquis.  HFpEF- Stable.  Echo as per above. Continue current medications.   Overweight- Encourage diet, exercise, and weight loss.     3/12/2025 clinic visit: Mr. Phelps reports feeling well with no chest pain, SOB, palpitations, TIA symptoms or syncope. ON 3/1/2025, he was recently admitted for hyperkalemia. Pt was discharged from hospital and go to normal HD center for regularly scheduled session.   Blood pressure today is 104/52.   Plan:  CAD s/p PCI 2006, PCI to RCA w/ 2 DIEGO 2/28/2024- Stable with no angina currently. He is no longer taking metoprolol succinate 25 mg daily or nifedipine 30 mg daily due to low blood pressures. Also, holding off on starting imdur due to low blood pressures. Continue Eliquis 5 mg bid, plavix 75 mg daily, and atorvastatin 80 mg daily.   Paroxysmal Afib- Continue amiodarone and Eliquis.  HFpEF- Stable.  Echo as per above. Continue current medications.   Overweight- " "Encourage diet, exercise, and weight loss.     -On 6/9-6/10/2025, pt was admitted with sepsis PNA and elevated troponin (details below as per cardiology consult note):  "69yoF with a hx CAD s/p PCI 2006, subsequent PCI to RCA 2/2024, pAF on Eliquis, HFpEF, PAD, ESRD on HD TTS, COPD, who is here with fevers, chills, and admitted with PNA. He has a troponin elevation in the setting of sepsis/PNA. Interventional cardiology is consulted for evaluation.   hsTn elevated but downtrending  Pt without ischemic symptoms  EKG non-ischemic  Echo pending   This is most likely troponin elevation in the setting of demand ischemia due to sepsis. His presentation is not consistent with ACS.   Recommendations:  No ACS protocol  If echo shows newly reduced EF, can get a stress test for ischemic evaluation    HPI:  Mr. Phelps reports feeling well with no chest pain, SOB, palpitations, TIA symptoms or syncope. States cough is improving. He is scheduled for echo on 7/21/2025.     Past Medical History:   Diagnosis Date    Atrial fibrillation     CAD (coronary artery disease) 2006    MI in 2006    CHF (congestive heart failure) 2017    CKD (chronic kidney disease) stage 3, GFR 30-59 ml/min     Dr. Latif.  in transplanted kidney    CKD (chronic kidney disease) stage 5, GFR less than 15 ml/min 02/02/2024    COVID-19 02/07/2023    Diverticulosis     Hyperlipidemia     Hypertension     Keloid cicatrix     Metabolic bone disease     Other emphysema 10/01/2019    Pericarditis     S/P kidney transplant 1992    x2 (1992 & 2005),    Thrombocytopenia     Thyroid disease     Tobacco use 09/05/2014     Past Surgical History:   Procedure Laterality Date    AV FISTULA PLACEMENT Left 12/18/2023    Procedure: CREATION, AV FISTULA;  Surgeon: JUANI Melendez III, MD;  Location: [facility] OR [address] [place] [address];  Service: Vascular;  Laterality: Left;  LUE AVF creation vs AVG insertion    CARDIOVERSION  04/30/15    CHOLECYSTECTOMY      COLONOSCOPY  April 20, 2011    " "Diverticulosis, repeat recommended in 3 yrs., repeat colonoscopy 2014 revealed 2 polyps.  He should return in 5 years.    COLONOSCOPY N/A 3/13/2020    Procedure: COLONOSCOPY;  Surgeon: Chon Casper MD;  Location: [facility] (4TH FLR);  Service: Endoscopy;  Laterality: N/A;  ok to hold coumadin x5days-see telephone encounter 2/4/20-tb    COLONOSCOPY N/A 2/4/2021    Procedure: COLONOSCOPY;  Surgeon: Chon Casper MD;  Location: [facility] (4TH FLR);  Service: Endoscopy;  Laterality: N/A;  Eliquis - per Dr. GAVIN Blunt ok to hold x 2 days and "restarted asap"- ERW  last prep "poor", [id number] ordered/ Pt requests Dr. Casper  prep ins. mailed - COVID screening on 2/1/21 PCW- ERW    COLONOSCOPY N/A 3/3/2021    Procedure: COLONOSCOPY;  Surgeon: Chon Casper MD;  Location: [facility] (4TH FLR);  Service: Endoscopy;  Laterality: N/A;  Patient with his second poor bowel pre and has poor prep today.  If patient not intersted or can't get colonoscopy tomorrow will need constipation bowel prep and will need to restart his Eliquis today.Thanks,Chon     per Dr Blunt-ok to hold Eliquis 2 days prior      COVID     COLONOSCOPY N/A 12/6/2024    Procedure: COLONOSCOPY;  Surgeon: Chon Casper MD;  Location: [facility] (2ND FLR);  Service: Endoscopy;  Laterality: N/A;  Plavix-Eliquis pending/ HD T-Th-Sat- lab ordered  2/25/24 ECHO PA 49  ref by / [facility] inst portal-RN  11/29-message to clearance nurse for plavix and eliquis hold-tb  ok to hold Eliquis 2 days per Dr Agata chua to hold Plavix 5 days per MEENA COLVINGT  12/2- p    CORONARY ANGIOGRAPHY N/A 2/28/2024    Procedure: ANGIOGRAM, CORONARY ARTERY;  Surgeon: Tylor Lincoln MD;  Location: [facility] CATH LAB;  Service: Cardiology;  Laterality: N/A;    CORONARY ANGIOPLASTY WITH STENT PLACEMENT  9/13/2006    FISTULOGRAM N/A 2/19/2024    Procedure: Fistulogram;  Surgeon: JUANI Melendez III, MD;  Location: [facility] CATH LAB;  Service: Peripheral Vascular;  " Laterality: N/A;    FISTULOGRAM, WITH PTA Left 6/3/2024    Procedure: FISTULOGRAM, WITH PTA;  Surgeon: JUANI Melendez III, MD;  [facility];  Service: Vascular;  Laterality: Left;  mGy: 51.58  GyCm2: 6.8285  Fluoro time: 5.1 min  Contrast: 28 cc    FISTULOGRAM, WITH PTA Left 3/5/2025    Procedure: FISTULOGRAM, WITH PTA;  Surgeon: JUANI Melendez III, MD;  [facility];  Service: Vascular;  Laterality: Left;    IRRIGATION AND DEBRIDEMENT Right 8/30/2023    Procedure: IRRIGATION AND DEBRIDEMENT, RIGHT MIDDLE FINGER;  Surgeon: Martin Larsen MD;  [facility];  Service: Orthopedics;  Laterality: Right;    KIDNEY TRANSPLANT  2005    LEFT HEART CATHETERIZATION N/A 2/26/2024    Procedure: Left heart cath;  Surgeon: Tylor Lincoln MD;  Location: [facility] CATH LAB;  Service: Cardiology;  Laterality: N/A;    PARATHYROIDECTOMY      PERCUTANEOUS TRANSLUMINAL ANGIOPLASTY OF ARTERIOVENOUS FISTULA Left 2/19/2024    Procedure: PTA, AV FISTULA;  Surgeon: JUANI Melendez III, MD;  Location: [facility] CATH LAB;  Service: Peripheral Vascular;  Laterality: Left;    STENT, DRUG ELUTING, SINGLE VESSEL, CORONARY N/A 2/28/2024    Procedure: Stent, Drug Eluting, Single Vessel, Coronary;  Surgeon: Tylor Lincoln MD;  Location: [facility] CATH LAB;  Service: Cardiology;  Laterality: N/A;    STENT, PERIPHERAL GRAFT Left 3/5/2025    Procedure: STENT, PERIPHERAL GRAFT;  Surgeon: JUANI Melendez III, MD;  [facility];  Service: Vascular;  Laterality: Left;  mGy 63.17  Gycm2 8.3386  flouro 63.17 min  contrast 38 ml    TREATMENT OF CARDIAC ARRHYTHMIA N/A 3/28/2022    Procedure: CARDIOVERSION;  Surgeon: Migue Blunt MD;  Location: [facility] EP LAB;  Service: Cardiology;  Laterality: N/A;  AF, DCC, MAC, GP, 3 PREP*RODRIGO deferred pt 100% compliant*    VENOPLASTY  6/3/2024    Procedure: ANGIOPLASTY, VEIN;  Surgeon: JUANI Melendez III, MD;  Location: [facility] OR [address] [place] [address];  Service: Vascular;;  Left  subclavian     Social History     Socioeconomic History    Marital status:    Occupational History    Occupation:    Tobacco Use    Smoking status: Former     Current packs/day: 0.00     Average packs/day: 1 pack/day for 54.2 years (54.2 ttl pk-yrs)     Types: Cigarettes     Start date: 1968     Quit date: 2/28/2022     Years since quitting: 3.3    Smokeless tobacco: Never    Tobacco comments:     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     Patient is currently retired   Substance and Sexual Activity    Alcohol use: No    Drug use: No    Sexual activity: Yes     Partners: Female   Social History Narrative    Retired      Social Drivers of Health     Financial Resource Strain: Low Risk  (7/8/2025)    Overall Financial Resource Strain (CARDIA)     Difficulty of Paying Living Expenses: Not very hard   Food Insecurity: No Food Insecurity (7/8/2025)    Hunger Vital Sign     Worried About Running Out of Food in the Last Year: Never true     Ran Out of Food in the Last Year: Never true   Transportation Needs: No Transportation Needs (7/8/2025)    PRAPARE - Transportation     Lack of Transportation (Medical): No     Lack of Transportation (Non-Medical): No   Physical Activity: Inactive (7/8/2025)    Exercise Vital Sign     Days of Exercise per Week: 0 days     Minutes of Exercise per Session: 0 min   Stress: No Stress Concern Present (7/8/2025)    [employer] - Occupational Stress Questionnaire     Feeling of Stress : Not at all   Housing Stability: Low Risk  (7/8/2025)    Housing Stability Vital Sign     Unable to Pay for Housing in the Last Year: No     Number of Times Moved in the Last Year: 0     Homeless in the Last Year: No     Family History   Problem Relation Name Age of Onset    No Known Problems Sister      No Known Problems Brother      Thyroid disease Mother          s/p surgery    Heart disease Father          had  pacemaker    No Known Problems Sister      Kidney failure Sister      Kidney disease Sister      No Known Problems Sister      Kidney disease Brother      Kidney failure Brother          s/p transplant    Diabetes Mellitus Neg Hx      Stroke Neg Hx      Heart attack Neg Hx      Cancer Neg Hx      Celiac disease Neg Hx      Cirrhosis Neg Hx      Colon cancer Neg Hx      Esophageal cancer Neg Hx      Inflammatory bowel disease Neg Hx      Rectal cancer Neg Hx      Stomach cancer Neg Hx      Ulcerative colitis Neg Hx      Liver disease Neg Hx      Liver cancer Neg Hx      Crohn's disease Neg Hx      Melanoma Neg Hx         Review of patient's allergies indicates:   Allergen Reactions    Ace inhibitors Swelling    Iodine Itching    Verapamil Other (See Comments)     Other reaction(s): Unknown    Povidone-iodine Itching       Medication List with Changes/Refills   Current Medications    ACETAMINOPHEN (TYLENOL) 500 MG TABLET    Take 1 tablet (500 mg total) by mouth every 6 (six) hours as needed for Pain.    ALBUTEROL (VENTOLIN HFA) 90 MCG/ACTUATION INHALER    Inhale 2 puffs into the lungs every 6 (six) hours as needed for Wheezing or Shortness of Breath. Rescue    ALBUTEROL-IPRATROPIUM (DUO-NEB) 2.5 MG-0.5 MG/3 ML NEBULIZER SOLUTION    Take 3 mLs by nebulization every 4 (four) hours as needed for Wheezing or Shortness of Breath. Rescue    AMIODARONE (PACERONE) 200 MG TAB    TAKE 1 TABLET BY MOUTH EVERY DAY    ATORVASTATIN (LIPITOR) 80 MG TABLET    Take 1 tablet (80 mg total) by mouth once daily.    B COMPLEX VITAMINS (B COMPLEX-VITAMIN B12) TABLET    Take 1 tablet by mouth once daily.    CLOPIDOGREL (PLAVIX) 75 MG TABLET    Take 1 tablet (75 mg total) by mouth once daily.    ELIQUIS 5 MG TAB    TAKE 1 TABLET BY MOUTH TWICE A DAY    FAMOTIDINE (PEPCID) 20 MG TABLET    Take 1 tablet (20 mg total) by mouth once daily. On dialysis days, please take this medicine after dialysis. Only taking on Tue, Thur, and  Sat.     FEXOFENADINE HCL (ALLEGRA ORAL)    Take 1 tablet by mouth daily as needed (allergies).    FLUTICASONE FUROATE-VILANTEROL (BREO) 100-25 MCG/DOSE DISKUS INHALER    Inhale 1 puff into the lungs once daily. Controller. Use this inhaler every day.    FLUTICASONE PROPIONATE (FLONASE) 50 MCG/ACTUATION NASAL SPRAY    1 spray (50 mcg total) by Each Nostril route daily as needed for Allergies.    GABAPENTIN (NEURONTIN) 300 MG CAPSULE    Take 1 capsule (300 mg total) by mouth every evening.    IPRATROPIUM (ATROVENT) 21 MCG (0.03 %) NASAL SPRAY    2 sprays by Each Nostril route 3 (three) times daily.    MIDODRINE (PROAMATINE) 5 MG TAB    Take 1 tablet (5 mg total) by mouth 3 (three) times daily as needed (if blood pressure less than 100/60 mmHg).    MULTIVIT-MIN-FA-LYCOPEN-LUTEIN (CENTRUM SILVER) 0.4 MG-300 MCG- 250 MCG TAB    Take 1 tablet by mouth once daily.    NITROGLYCERIN (NITROSTAT) 0.4 MG SL TABLET    Place 1 tablet (0.4 mg total) under the tongue every 5 (five) minutes as needed for Chest pain.    OMEPRAZOLE (PRILOSEC) 20 MG CAPSULE    Take 1 capsule (20 mg total) by mouth daily as needed (heartburn).    PREDNISONE (DELTASONE) 50 MG TAB    Take 1 tablet (50 mg total) by mouth once daily. for 5 days    PULSE OXIMETER (PULSE OXIMETER) DEVICE    by Apply Externally route 2 (two) times a day. Use twice daily at 8 AM and 3 PM and record the value in [facility] as directed.    SEVELAMER CARBONATE (RENVELA) 800 MG TAB    This medicine is used to treat your phosphorus level. Please take 1 tablet (800 mg) once a day with the largest meal of the day.    TORSEMIDE (DEMADEX) 20 MG TAB    Take 2 tablets (40 mg total) by mouth 3 (three) times a week.   Discontinued Medications    AZITHROMYCIN (Z-ELIAS) 250 MG TABLET    Take first 2 tablets together ON DAY 1, then 1 every day until finished (days 2-5).       Review of Systems  Constitution: Denies chills, fever, and sweats.  HENT: Denies headaches or blurry vision.  Cardiovascular: Denies  "chest pain or irregular heart beat.  Respiratory: Positive for shortness of breath on exertion.  Gastrointestinal: Denies abdominal pain, nausea, or vomiting.  Musculoskeletal: Denies muscle cramps.  Neurological: Denies dizziness or focal weakness.  Psychiatric/Behavioral: Normal mental status.  Hematologic/Lymphatic: Denies bleeding problem or easy bruising/bleeding.  Skin: Denies rash or suspicious lesions    Physical Examination  Ht 6' 1" (1.854 m)   Wt 92.1 kg (203 lb 0.7 oz)   BMI 26.79 kg/m²     Constitutional: No acute distress, conversant  HEENT: Sclera anicteric, Pupils equal, round and reactive to light, extraocular motions intact, Oropharynx clear  Neck: No JVD, no carotid bruits  Cardiovascular: regular rate and rhythm, no murmur, rubs or gallops, normal S1/S2  Pulmonary: Clear to auscultation bilaterally  Abdominal: Abdomen soft, nontender, nondistended, positive bowel sounds  Extremities: No lower extremity edema,   Pulses:  Carotid pulses are 2+ on the right side, and 2+ on the left side.  Radial pulses are 2+ on the right side, and 2+ on the left side.   Femoral pulses are 2+ on the right side, and 2+ on the left side.  Popliteal pulses are 2+ on the right side, and 2+ on the left side.   Dorsalis pedis pulses are 2+ on the right side, and 2+ on the left side.   Posterior tibial pulses are 2+ on the right side, and 2+ on the left side.    Skin: No ecchymosis, erythema, or ulcers  Psych: Alert and oriented x 3, appropriate affect  Neuro: CNII-XII intact, no focal deficits    Labs:  Most Recent Data  CBC:   Lab Results   Component Value Date    WBC 9.99 07/07/2025    HGB 12.0 (L) 07/07/2025    HCT 39.3 (L) 07/07/2025     07/07/2025    MCV 87 07/07/2025    RDW 16.1 (H) 07/07/2025     BMP:   Lab Results   Component Value Date     07/07/2025    K 4.1 07/07/2025     07/07/2025    CO2 23 07/07/2025    BUN 24 (H) 07/07/2025    CREATININE 9.2 (H) 07/07/2025     07/07/2025    " CALCIUM 10.0 07/07/2025    MG 2.1 06/09/2025    PHOS 5.3 (H) 06/09/2025     LFTS;   Lab Results   Component Value Date    PROT 7.7 07/07/2025    ALBUMIN 3.2 (L) 07/07/2025    BILITOT 0.3 07/07/2025    AST 27 07/07/2025    ALKPHOS 96 07/07/2025    ALT 18 07/07/2025     COAGS:   Lab Results   Component Value Date    INR 1.5 (H) 06/08/2025    PROTIME 15.6 (H) 06/08/2025     FLP:   Lab Results   Component Value Date    CHOL 109 (L) 06/11/2024    CHOL 109 (L) 06/11/2024    HDL 54 06/11/2024    HDL 54 06/11/2024    LDLCALC 44.8 (L) 06/11/2024    LDLCALC 44.8 (L) 06/11/2024    TRIG 51 06/11/2024    TRIG 51 06/11/2024    CHOLHDL 49.5 06/11/2024    CHOLHDL 49.5 06/11/2024     CARDIAC:   Lab Results   Component Value Date    TROPONINI 3.311 (H) 04/11/2024     (H) 07/07/2025       Imaging:    EKG 7/20/2023:  Sinus bradycardia with Premature atrial complexes with Aberrant conduction   LVH with repolarization abnormality    Echo 12/7/2024:    Left Ventricle: The left ventricle is normal in size. Ventricular mass is normal. Normal wall thickness. Normal wall motion. There is normal systolic function with a visually estimated ejection fraction of 60 - 65%. Diastolic function cannot be reliably determined in the presence of mitral annular calcification. Normal left ventricular filling pressure.    Right Ventricle: Normal right ventricular cavity size. Wall thickness is normal. Right ventricle wall motion  is normal. Systolic function is normal.    Left Atrium: Left atrium is severely dilated.    Right Atrium: Right atrium is moderately dilated.    Aortic Valve: There is moderate aortic valve sclerosis. There is moderate annular calcification present. Mildly restricted motion. There is mild stenosis. Aortic valve area by VTI is 1.6 cm2. Aortic valve peak velocity is 2.6 m/s. Mean gradient is 18.7 mmHg. The dimensionless index is 0.48. There is mild aortic regurgitation with a centrally directed jet. AV morphology not well  seen. Non and left coronary leaflet is calcified and restricted.    Mitral Valve: There is severe posterior mitral annular calcification present.    Aorta: Aortic root is normal in size measuring 3.45 cm. Ascending aorta is normal measuring 2.78 cm.    Pulmonary Artery: There is mild pulmonary hypertension. The estimated pulmonary artery systolic pressure is 38 mmHg.    IVC/SVC: Normal venous pressure at 3 mmHg.    Pericardium: There is no pericardial effusion.    PET Stress Test 9/13/2023:    There is a small sized, mild intensity apical inferior and inferoseptal reversible perfusion abnormality involving 6% of LV myocardium indicative of focal coronary stenosis.    Within the perfusion abnormality, absolute myocardial perfusion (cc/min/gm) averaged 0.56 cc/min/g at rest, 0.71 cc/min/g at stress and CFR was 1.27 , which equates to severely reduced coronary flow capacity within the LAD territory.    There are no other significant perfusion abnormalities.    The whole heart absolute myocardial perfusion values averaged 0.74 cc/min/g at rest, which is normal; 1.17 cc/min/g at stress, which is mildly reduced; and CFR is 1.59 , which is mildly reduced.    CT attenuation images demonstrate severe diffuse coronary calcifications in the LAD, and moderate diffuse coronary calcifications in the LCX and RCA territory and mild diffuse aortic calcifications in the descending aorta.    The gated perfusion images showed an ejection fraction of 53% at rest and 56% during stress. A normal ejection fraction is greater than 47%.    The wall motion is normal at rest and during stress.    The LV cavity size is mildly enlarged at rest and stress.    The ECG portion of the study is abnormal but not diagnostic due to resting ST-T abnormalities.    There were no arrhythmias during stress.    The patient reported chest pain during the stress test.    There are no prior studies for comparison.    Echo 2/10/2023:  The left ventricle is normal  in size with normal systolic function. The estimated ejection fraction is 65%.  Normal right ventricular size with normal right ventricular systolic function.  Grade II left ventricular diastolic dysfunction.  Severe left atrial enlargement.  There is mild aortic valve stenosis. Aortic valve area is 1.8 cm2; peak velocity is 2.9 m/s; mean gradient is 15 mmHg.  Pulmonary HTN - the estimated PA systolic pressure is > 41mm Hg. (The IVC was not well visualized.)    Assessment/Plan:  Ibrahima Phelps is a 69 y.o. male with CAD s/p PCI 2006, PCI to RCA w/ 2 DIEGO 2/28/2024, paroxysmal Afib (on amiodarone and Eliquis), HFpEF, PAD, S/P kidney transplant on immunosuppressive therapy with Tacrolimus and prednisone, COPD, smoking, who presents for a follow up appointment.    CAD s/p PCI 2006, PCI to RCA w/ 2 DIEGO 2/28/2024- Stable with no angina currently. Given recent admission with sepsis and elevated troponin, check echo and PET stress test. He is no longer taking metoprolol succinate 25 mg daily or nifedipine 30 mg daily due to low blood pressures. Also, holding off on starting imdur due to low blood pressures. Continue Eliquis 5 mg bid, plavix 75 mg daily, and atorvastatin 80 mg daily.     2. Paroxysmal Afib- Continue amiodarone and Eliquis.    3. HFpEF- Stable. Check updated echo as scheduled. Continue current medications.     4. Overweight- Encourage diet, exercise, and weight loss.     Will call pt with results of echo and PET stress test  Otherwise, follow up in 2 months    Total duration of face to face visit time 15 minutes.  Total time spent counseling greater than fifty percent of total visit time.  Counseling included discussion regarding imaging findings, diagnosis, possibilities, treatment options, risks and benefits.  The patient had many questions regarding the options and long-term effects.    Hussain Vail MD, PhD  Interventional Cardiology

## 2025-07-10 NOTE — PATIENT INSTRUCTIONS
Assessment/Plan:  Ibrahima Phelps is a 69 y.o. male with CAD s/p PCI 2006, PCI to RCA w/ 2 DIEGO 2/28/2024, paroxysmal Afib (on amiodarone and Eliquis), HFpEF, PAD, S/P kidney transplant on immunosuppressive therapy with Tacrolimus and prednisone, COPD, smoking, who presents for a follow up appointment.    CAD s/p PCI 2006, PCI to RCA w/ 2 DIEGO 2/28/2024- Stable with no angina currently. Given recent admission with sepsis and elevated troponin, check echo and PET stress test. He is no longer taking metoprolol succinate 25 mg daily or nifedipine 30 mg daily due to low blood pressures. Also, holding off on starting imdur due to low blood pressures. Continue Eliquis 5 mg bid, plavix 75 mg daily, and atorvastatin 80 mg daily.     2. Paroxysmal Afib- Continue amiodarone and Eliquis.    3. HFpEF- Stable. Check updated echo as scheduled. Continue current medications.     4. Overweight- Encourage diet, exercise, and weight loss.     Will call pt with results of echo and PET stress test  Otherwise, follow up in 2 months

## 2025-07-14 ENCOUNTER — CLINICAL SUPPORT (OUTPATIENT)
Dept: REHABILITATION | Facility: HOSPITAL | Age: 70
End: 2025-07-14
Payer: MEDICARE

## 2025-07-14 DIAGNOSIS — R29.898 DECREASED STRENGTH OF TRUNK AND BACK: Primary | ICD-10-CM

## 2025-07-14 PROCEDURE — 97112 NEUROMUSCULAR REEDUCATION: CPT

## 2025-07-14 PROCEDURE — 97110 THERAPEUTIC EXERCISES: CPT

## 2025-07-14 NOTE — PROGRESS NOTES
"  Outpatient Rehab    Physical Therapy Visit    Patient Name: Ibrahima Phelps  MRN: [id number]  YOB: 1955  Encounter Date: 2025    Therapy Diagnosis:   Encounter Diagnosis   Name Primary?    Decreased strength of trunk and back Yes     Physician: Sb Roca PA*    Physician Orders: Eval and Treat  Medical Diagnosis: Lumbar spine pain  Acute right-sided back pain with sciatica  Surgical Diagnosis: Not applicable for this Episode   Surgical Date: Not applicable for this Episode  Days Since Last Surgery: Not applicable for this Episode    Visit # / Visits Authorized:    Insurance Authorization Period: 2025 to 2025  Date of Evaluation: 2025  Plan of Care Certification: 2025 to 2025   Reassessment Due: 2025      PT/PTA: PT   Number of PTA visits since last PT visit:0  Time In: 0930   Time Out: 1030  Total Time (in minutes): 60   Total Billable Time (in minutes): 30    FOTO:  Intake Score: 54%  Survey Score 2: Not applicable for this Episode%  Survey Score 3: Not applicable for this Episode%    Precautions:  Additional Precautions and Protocol Details: A fib, CAD, CHF, CKD, HTN, h/o kidney transplant x 2    Pattern of pain determined: 2  Work and leisure: retired , mostly sedentary now   Pt goals: "strengthen my heart     Subjective   Pt returns today with moderate low back pain.  He had to cancel previous visit due to ED visit for COPD.  Pt has been seen by both PCP and cardiologist since ED visit, no restrictions to activity..  Pain reported as 5/10.      Objective        Lumbar IM Testing Results:      Date of testin25   ROM 0-45 deg   Max Peak Torque 186    Min Peak Torque 68    Flex/Ext Ratio 2.7:1   % below normative data 41%       Treatment:   Ibrahima received neuromuscular education  to isolate and engage spinal stabilization musculature correctly for motor control and coordination to aid in function and posture for 15 minutes on the " "Medical Medx Machine.  Patient performed MedX dynamic exercise with emphasis on spinal muscular control using pacer throughout  active range of motion. Therapist assisted patient in achieving optimal exertion for neural reeducation and endurance training by using the  Clay Exertion Rating scale, by instructing the patient to aim for mid range of exertion, performing 15-20 repetitions, slowly, correctly,and safely.   Additional neuromuscular activities  to improve:         7/14/2025     4:06 PM   [facility]   Visit Number 2   VAS Pain Rating 5   Lumbar Stretches - Slouch Overcorrection 10   Flexion in Lying 5   Lumbar Extension Seat Pad 0   Femur Restraint 6   Top Dead Center 24   Counterweight 201   Lumbar Flexion 45   Lumbar Extension 0   Lumbar Peak Torque 186 ft. lbs.   Min Torque 68   Test Percent Below Normative Data 41 %         therapeutic exercises to develop strength, endurance, ROM, flexibility, posture, and core stabilization for 45 minutes including:    LTR x 10  SKTC with towel x 5, 10 sec hold  Piriformis stretch 20" x 3  Supine sciatic nerve glide x 15 right   Transverse abdominal activation x 10  BKFO x 10  Bridges x 15  SOC x 10    Peripheral muscle strengthening which included 1 set of 15-20 repetitions at a slow, controlled 10-13 second per rep pace focused on strengthening supporting musculature for improved body mechanics and functional mobility.  Pt and therapist focused on proper form during treatment to ensure optimal strengthening of each targeted muscle group.  Machines were utilized including torso rotation, leg press, hip abd and hip add, leg ext.  Leg curl, triceps, biceps, chest and row added visit 3    cold pack for 0 minutes to low back.    Time Entry(in minutes):  Neuromuscular Re-Education Time Entry: 15  Therapeutic Exercise Time Entry: 45    Assessment & Plan   Assessment: Pt presents to second [facility] visit reporting moderate low back pain.  He was able to demo HEP " with Mod VC for form. Core stability initiated with transverse abdominal activation, knee fall outs and bridges. Isometric strength testing performed today. He demonstrates increased flexion/extension ratio and 41% strength deficits as compared to men the same age indicating impaired motor control of lumbar paraspinals.  Pt was also able to initiate the peripheral strengthening exercises without increased discomfort and will complete the complete circuit next visit as tolerated.   Evaluation/Treatment Tolerance: Patient tolerated treatment well    The patient will continue to benefit from skilled outpatient physical therapy in order to address the deficits listed in the problem list on the initial evaluation, provide patient and family education, and maximize the patients level of independence in the home and community environments.     The patient's spiritual, cultural, and educational needs were considered, and the patient is agreeable to the plan of care and goals.     Education  Education was done with Patient. The patient's learning style includes Demonstration, Listening, Pictures/video, and Reading. The patient Demonstrates understanding and Verbalizes understanding.         - PT role and POC - HEP - MedX testing results - pacing and extension hold for max strength and endurance gains - RPE scale        Home Exercises Provided and Patient Education Provided   Home exercises include: LTR, SKTC, hamstring stretch  Cardio program: visit 5  Lifting education date: visit 11  Posture/Lumbar roll: recommended  Fridge Magnet Discharge handout (date given):  Equipment at home/gym membership: no      Plan: Continue present plan of care per HB protocol.    Goals:   Active       long term goals       Pt will demonstrate increased lumbar MedX ROM by at least 6 degrees from initial ROM value, resulting in improved ability to perform functional forward bending while standing and sitting. (Progressing)       Start:   06/18/25    Expected End:  08/27/25            Pt will demonstrate increased MedX average isometric strength value by 35% from initial test resulting in improved ability to perform bending, lifting, and carrying activities safely and confidently. (Progressing)       Start:  06/18/25    Expected End:  08/27/25            Pt to demonstrate ability to independently control and reduce their pain through posture positioning and mechanical movements throughout a typical day. (Progressing)       Start:  06/18/25    Expected End:  08/27/25            Pt will demonstrate reduced pain and improved functional outcomes as reported on the FOTO by reaching an intake score of >/= 61% functional ability in order to demonstrate subjective improvement in patient's condition. . (Progressing)       Start:  06/18/25            Pt will demonstrate independence with the HEP at discharge. (Progressing)       Start:  06/18/25               patient goals       Pt will be able to walk for 30 minutes at moderate pace without increase in low back pain. (Progressing)       Start:  06/18/25               short term goals       Pt will demonstrate increased lumbar MedX ROM by at least 3 degrees from the initial ROM value with improvements noted in functional ROM and ability to perform ADLs.  (Progressing)       Start:  06/18/25    Expected End:  07/30/25            Pt will demonstrate increased MedX average isometric strength value by 15% from initial test resulting in improved ability to perform bending, lifting, and carrying activities safely, confidently. (Progressing)       Start:  06/18/25    Expected End:  07/30/25            Pt will report a reduction in worst pain score by 1-2 points for improved tolerance for walking increased distances.  (Progressing)       Start:  06/18/25    Expected End:  07/30/25            Pt able to perform HEP correctly with minimal cueing or supervision from therapist to encourage independent management of  symptoms.  (Progressing)       Start:  06/18/25    Expected End:  08/27/25                Jeanne Ohara PT

## 2025-07-16 ENCOUNTER — CLINICAL SUPPORT (OUTPATIENT)
Dept: REHABILITATION | Facility: HOSPITAL | Age: 70
End: 2025-07-16
Payer: MEDICARE

## 2025-07-16 DIAGNOSIS — R29.898 DECREASED STRENGTH OF TRUNK AND BACK: Primary | ICD-10-CM

## 2025-07-16 PROCEDURE — 97112 NEUROMUSCULAR REEDUCATION: CPT

## 2025-07-16 PROCEDURE — 97110 THERAPEUTIC EXERCISES: CPT

## 2025-07-16 NOTE — PROGRESS NOTES
"  Outpatient Rehab    Physical Therapy Visit    Patient Name: Ibrahima Phelps  MRN: [id number]  YOB: 1955  Encounter Date: 2025    Therapy Diagnosis:   Encounter Diagnosis   Name Primary?    Decreased strength of trunk and back Yes     Physician: Sb Roca PA*    Physician Orders: Eval and Treat  Medical Diagnosis: Lumbar spine pain  Acute right-sided back pain with sciatica  Surgical Diagnosis: Not applicable for this Episode   Surgical Date: Not applicable for this Episode  Days Since Last Surgery: Not applicable for this Episode    Visit # / Visits Authorized:    Insurance Authorization Period: 2025 to 2025  Date of Evaluation: 2025  Plan of Care Certification: 2025 to 2025   Reassessment Due: 2025      PT/PTA: PT   Number of PTA visits since last PT visit:0  Time In: 0930   Time Out: 1030  Total Time (in minutes): 60   Total Billable Time (in minutes): 30    FOTO:  Intake Score: 54%  Survey Score 2: Not applicable for this Episode%  Survey Score 3: Not applicable for this Episode%    Precautions:  Additional Precautions and Protocol Details: A fib, CAD, CHF, CKD, HTN, h/o kidney transplant x 2    Pattern of pain determined: 2  Work and leisure: retired , mostly sedentary now   Pt goals: "strengthen my heart     Subjective   Pt arrives with sharp right sided low back (SI) pain today..  Pain reported as 6/10.      Objective        Lumbar IM Testing Results:      Date of testin25   ROM 0-45 deg   Max Peak Torque 186    Min Peak Torque 68    Flex/Ext Ratio 2.7:1   % below normative data 41%       Treatment:   Ibrahima received neuromuscular education  to isolate and engage spinal stabilization musculature correctly for motor control and coordination to aid in function and posture for 15 minutes on the Medical Medx Machine.  Patient performed MedX dynamic exercise with emphasis on spinal muscular control using pacer throughout  active " "range of motion. Therapist assisted patient in achieving optimal exertion for neural reeducation and endurance training by using the  Clay Exertion Rating scale, by instructing the patient to aim for mid range of exertion, performing 15-20 repetitions, slowly, correctly,and safely.   Additional neuromuscular activities  to improve:         7/16/2025    12:04 PM   [facility]   Visit Number 3   VAS Pain Rating 6   Time 5   Lumbar Stretches - Slouch Overcorrection 10   Flexion in Lying 5   Lumbar Weight 60 lbs   Repetitions 18   Rating of Perceived Exertion 4   Ice - Z Lie (in min.) 5         therapeutic exercises to develop strength, endurance, ROM, flexibility, posture, and core stabilization for 45 minutes including:    LTR x 10  SKTC with towel x 5, 10 sec hold  Piriformis stretch 20" x 3  Supine sciatic nerve glide x 15 right   Transverse abdominal activation x 10  BKFO x 10  Bridges x 15  SOC x 10    Peripheral muscle strengthening which included 1 set of 15-20 repetitions at a slow, controlled 10-13 second per rep pace focused on strengthening supporting musculature for improved body mechanics and functional mobility.  Pt and therapist focused on proper form during treatment to ensure optimal strengthening of each targeted muscle group.  Machines were utilized including torso rotation, leg press, hip abd and hip add, leg ext.  Leg curl, triceps, biceps, chest and row added visit 3    Manual therapy: prone SI joint mobs to right lower extremity via long axis distraction    cold pack for 0 minutes to low back.    Time Entry(in minutes):       Assessment & Plan   Assessment: Pt presents to third [facility] visit with sharp right sided low back pain.  Assessment indicated posterior rotation of right side of pelvis, completed muscle energy techniques with mild relief and reported centralization of symptoms.  Pt was able to start neuro reeducation training, strengthening, and endurance training on the " lumbar MedX at >40% of max peak torque according to the initial visit isometric test. Weight set at 60 ft/lbs and pt was able to complete 18 reps, with 4/10 RPE. Mod cuing given for pacing and extension hold focusing on use of lower back instead of legs. Pt was also able to complete half of the peripheral strengthening exercises without increased discomfort and will complete the complete circuit next visit as tolerated.       The patient will continue to benefit from skilled outpatient physical therapy in order to address the deficits listed in the problem list on the initial evaluation, provide patient and family education, and maximize the patients level of independence in the home and community environments.     The patient's spiritual, cultural, and educational needs were considered, and the patient is agreeable to the plan of care and goals.             Home Exercises Provided and Patient Education Provided   Home exercises include: LTR, SKTC, hamstring stretch  Cardio program: visit 5  Lifting education date: visit 11  Posture/Lumbar roll: recommended  Fridge Magnet Discharge handout (date given):  Equipment at home/gym membership: no      Plan: Continue present plan of care per HB protocol.    Goals:   Active       long term goals       Pt will demonstrate increased lumbar MedX ROM by at least 6 degrees from initial ROM value, resulting in improved ability to perform functional forward bending while standing and sitting. (Progressing)       Start:  06/18/25    Expected End:  08/27/25            Pt will demonstrate increased MedX average isometric strength value by 35% from initial test resulting in improved ability to perform bending, lifting, and carrying activities safely and confidently. (Progressing)       Start:  06/18/25    Expected End:  08/27/25            Pt to demonstrate ability to independently control and reduce their pain through posture positioning and mechanical movements throughout a typical  day. (Progressing)       Start:  06/18/25    Expected End:  08/27/25            Pt will demonstrate reduced pain and improved functional outcomes as reported on the FOTO by reaching an intake score of >/= 61% functional ability in order to demonstrate subjective improvement in patient's condition. . (Progressing)       Start:  06/18/25            Pt will demonstrate independence with the HEP at discharge. (Progressing)       Start:  06/18/25               patient goals       Pt will be able to walk for 30 minutes at moderate pace without increase in low back pain. (Progressing)       Start:  06/18/25               short term goals       Pt will demonstrate increased lumbar MedX ROM by at least 3 degrees from the initial ROM value with improvements noted in functional ROM and ability to perform ADLs.  (Progressing)       Start:  06/18/25    Expected End:  07/30/25            Pt will demonstrate increased MedX average isometric strength value by 15% from initial test resulting in improved ability to perform bending, lifting, and carrying activities safely, confidently. (Progressing)       Start:  06/18/25    Expected End:  07/30/25            Pt will report a reduction in worst pain score by 1-2 points for improved tolerance for walking increased distances.  (Progressing)       Start:  06/18/25    Expected End:  07/30/25            Pt able to perform HEP correctly with minimal cueing or supervision from therapist to encourage independent management of symptoms.  (Progressing)       Start:  06/18/25    Expected End:  08/27/25                Jeanne Ohara, PT

## 2025-07-18 ENCOUNTER — OFFICE VISIT (OUTPATIENT)
Dept: ORTHOPEDICS | Facility: CLINIC | Age: 70
End: 2025-07-18
Payer: MEDICARE

## 2025-07-18 ENCOUNTER — HOSPITAL ENCOUNTER (OUTPATIENT)
Dept: CARDIOLOGY | Facility: HOSPITAL | Age: 70
Discharge: HOME | End: 2025-07-18
Attending: INTERNAL MEDICINE
Payer: MEDICARE

## 2025-07-18 VITALS
BODY MASS INDEX: 26.9 KG/M2 | DIASTOLIC BLOOD PRESSURE: 61 MMHG | HEART RATE: 57 BPM | WEIGHT: 203 LBS | HEIGHT: 73 IN | SYSTOLIC BLOOD PRESSURE: 135 MMHG

## 2025-07-18 VITALS — BODY MASS INDEX: 27.54 KG/M2 | HEIGHT: 73 IN | WEIGHT: 207.81 LBS

## 2025-07-18 DIAGNOSIS — M54.16 LUMBAR RADICULOPATHY: ICD-10-CM

## 2025-07-18 DIAGNOSIS — M54.41 ACUTE RIGHT-SIDED BACK PAIN WITH SCIATICA: Primary | ICD-10-CM

## 2025-07-18 DIAGNOSIS — N18.4 CKD (CHRONIC KIDNEY DISEASE), STAGE IV: ICD-10-CM

## 2025-07-18 DIAGNOSIS — R79.89 ELEVATED TROPONIN: ICD-10-CM

## 2025-07-18 DIAGNOSIS — I25.10 CORONARY ARTERY DISEASE INVOLVING NATIVE CORONARY ARTERY OF NATIVE HEART WITHOUT ANGINA PECTORIS: Chronic | ICD-10-CM

## 2025-07-18 DIAGNOSIS — T86.12 FAILED KIDNEY TRANSPLANT: ICD-10-CM

## 2025-07-18 DIAGNOSIS — Z94.0 H/O KIDNEY TRANSPLANT: Chronic | ICD-10-CM

## 2025-07-18 DIAGNOSIS — N18.6 ESRD (END STAGE RENAL DISEASE) ON DIALYSIS: Chronic | ICD-10-CM

## 2025-07-18 DIAGNOSIS — Z99.2 ESRD (END STAGE RENAL DISEASE) ON DIALYSIS: Chronic | ICD-10-CM

## 2025-07-18 LAB
CFR FLOW - ANTERIOR: 1.57
CFR FLOW - INFERIOR: 1.52
CFR FLOW - LATERAL: 1.88
CFR FLOW - MAX: 2.15
CFR FLOW - MIN: 0.87
CFR FLOW - SEPTAL: 1.58
CFR FLOW - WHOLE HEART: 1.64
CFR FLOW- DEFECT 1: 1.09
CFR FLOW- DEFECT 2: 1.47
CV PHARM DOSE: 0.4 MG
CV STRESS BASE HR: 57 BPM
DIASTOLIC BLOOD PRESSURE: 61 MMHG
EJECTION FRACTION- HIGH: 65 %
END DIASTOLIC INDEX-HIGH: 153 ML/M2
END DIASTOLIC INDEX-LOW: 93 ML/M2
END SYSTOLIC INDEX-HIGH: 71 ML/M2
END SYSTOLIC INDEX-LOW: 31 ML/M2
NUC REST DIASTOLIC VOLUME INDEX: 132
NUC REST EJECTION FRACTION: 52
NUC REST SYSTOLIC VOLUME INDEX: 64
NUC STRESS DIASTOLIC VOLUME INDEX: 147
NUC STRESS EJECTION FRACTION: 54 %
NUC STRESS SYSTOLIC VOLUME INDEX: 68
OHS CV CPX 85 PERCENT MAX PREDICTED HEART RATE MALE: 128
OHS CV CPX MAX PREDICTED HEART RATE: 150
OHS CV CPX PATIENT HEIGHT IN: 73
OHS CV CPX PATIENT IS FEMALE: 0
OHS CV CPX PATIENT IS MALE: 1
OHS CV CPX PEAK DIASTOLIC BLOOD PRESSURE: 37 MMHG
OHS CV CPX PEAK HEAR RATE: 57 BPM
OHS CV CPX PEAK RATE PRESSURE PRODUCT: 8379
OHS CV CPX PEAK SYSTOLIC BLOOD PRESSURE: 147 MMHG
OHS CV CPX PERCENT MAX PREDICTED HEART RATE ACHIEVED: 38
OHS CV CPX RATE PRESSURE PRODUCT PRESENTING: 7695
OHS CV INITIAL DOSE: 28.9 MCG/KG/MIN
OHS CV MODERATELY REDUCED FLOW CAPACITY: 31 %
OHS CV MYOCARDIAL STEAL: 4 %
OHS CV NO ISCHEMIA MILDLY REDUCED FLOW CAPACTY: 58 %
OHS CV NO ISCHEMIA MINIMALLY REDUCED FLOW CAPACITY: 0 %
OHS CV NORMAL FLOW CAPACITY COMPARABLE TO HEALTHY YOUNG VOLUNTEERS: 0 %
OHS CV PEAK DOSE: 29 MCG/KG/MIN
OHS CV PET ID: 9352
OHS CV SEVERELY REDUCED FLOW CAPACITY: 11 %
OHS CV TOTAL EXAM DLP: 387.18 MGY-CM
PERFUSION DEFECT 1 SIZE IN %: 10 %
PERFUSION DEFECT 2 SIZE IN %: 3 %
REST FLOW - ANTERIOR: 0.59 CC/MIN/G
REST FLOW - INFERIOR: 0.45 CC/MIN/G
REST FLOW - LATERAL: 0.62 CC/MIN/G
REST FLOW - MAX: 0.74 CC/MIN/G
REST FLOW - MIN: 0.35 CC/MIN/G
REST FLOW - SEPTAL: 0.57 CC/MIN/G
REST FLOW - WHOLE HEART: 0.56 CC/MIN/G
REST FLOW- DEFECT 1: 0.48 CC/MIN/G
REST FLOW- DEFECT 2: 0.38 CC/MIN/G
RETIRED EF AND QEF - SEE NOTES: 53 %
STRESS FLOW - ANTERIOR: 0.93 CC/MIN/G
STRESS FLOW - INFERIOR: 0.68 CC/MIN/G
STRESS FLOW - LATERAL: 1.17 CC/MIN/G
STRESS FLOW - MAX: 1.36 CC/MIN/G
STRESS FLOW - MIN: 0.32 CC/MIN/G
STRESS FLOW - SEPTAL: 0.91 CC/MIN/G
STRESS FLOW - WHOLE HEART: 0.92 CC/MIN/G
STRESS FLOW- DEFECT 1: 0.52 CC/MIN/G
STRESS FLOW- DEFECT 2: 0.55 CC/MIN/G
SYSTOLIC BLOOD PRESSURE: 135 MMHG

## 2025-07-18 PROCEDURE — 99999 PR PBB SHADOW E&M-EST. PATIENT-LVL IV: CPT | Mod: PBBFAC,HCNC,, | Performed by: PHYSICIAN ASSISTANT

## 2025-07-18 PROCEDURE — A9555 RB82 RUBIDIUM: HCPCS | Mod: HCNC | Performed by: INTERNAL MEDICINE

## 2025-07-18 PROCEDURE — 78431 MYOCRD IMG PET RST&STRS CT: CPT | Mod: 26,HCNC,, | Performed by: INTERNAL MEDICINE

## 2025-07-18 PROCEDURE — 63600175 PHARM REV CODE 636 W HCPCS: Mod: HCNC | Performed by: INTERNAL MEDICINE

## 2025-07-18 PROCEDURE — 93017 CV STRESS TEST TRACING ONLY: CPT | Mod: HCNC

## 2025-07-18 PROCEDURE — 93018 CV STRESS TEST I&R ONLY: CPT | Mod: HCNC,,, | Performed by: INTERNAL MEDICINE

## 2025-07-18 PROCEDURE — 78434 AQMBF PET REST & RX STRESS: CPT | Mod: 26,HCNC,, | Performed by: INTERNAL MEDICINE

## 2025-07-18 PROCEDURE — 93016 CV STRESS TEST SUPVJ ONLY: CPT | Mod: HCNC,,, | Performed by: INTERNAL MEDICINE

## 2025-07-18 RX ORDER — AMINOPHYLLINE 25 MG/ML
75 INJECTION, SOLUTION INTRAVENOUS ONCE
Status: COMPLETED | OUTPATIENT
Start: 2025-07-18 | End: 2025-07-18

## 2025-07-18 RX ORDER — LIDOCAINE 50 MG/G
1 PATCH TOPICAL DAILY
Qty: 30 PATCH | Refills: 0 | Status: SHIPPED | OUTPATIENT
Start: 2025-07-18

## 2025-07-18 RX ORDER — REGADENOSON 0.08 MG/ML
0.4 INJECTION, SOLUTION INTRAVENOUS
Status: COMPLETED | OUTPATIENT
Start: 2025-07-18 | End: 2025-07-18

## 2025-07-18 RX ADMIN — RUBIDIUM CHLORIDE RB-82 29 MILLICURIE: 150 INJECTION, SOLUTION INTRAVENOUS at 10:07

## 2025-07-18 RX ADMIN — AMINOPHYLLINE 75 MG: 25 INJECTION, SOLUTION INTRAVENOUS at 10:07

## 2025-07-18 RX ADMIN — REGADENOSON 0.4 MG: 0.08 INJECTION, SOLUTION INTRAVENOUS at 10:07

## 2025-07-18 RX ADMIN — RUBIDIUM CHLORIDE RB-82 28.9 MILLICURIE: 150 INJECTION, SOLUTION INTRAVENOUS at 09:07

## 2025-07-18 NOTE — PROGRESS NOTES
SUBJECTIVE:     Chief Complaint & History of Present Illness:  Ibrahima Phelps is a Established  patient 69 y.o. male who is seen here today with a complaint of    Chief Complaint   Patient presents with    Right Lower Leg - Pain    Leg Pain    Low-back Pain    . History of Present Illness    - Lower back pain with associated leg weakness and pain.    - Presents with complaints of leg pain and weakness, reporting difficulty walking  - Pain in lower back extends down his leg, currently reaching only his thigh  - Therapy provides temporary relief, especially when cold ice is applied before leaving  - Pain intensifies again by the next day or night  - Recently visited the emergency room where he received two injections in his arm that helped with the pain  - Had a nerve block injection in his neck previously, which is still effective  - Approximately two years ago, had a cortisone injection in his shoulder for a rotator cuff issue, which has not required follow-up  - Reports a history of kidney issues for 40 years  - Currently undergoing dialysis 3 times per week on Tuesday, Thursday, and Saturday  - Had a stress test earlier on the day of this visit  - Expresses frustration with his busy medical schedule    - Nerve block injection in the neck: Still effective  - PT: Provided some relief during sessions. Cold ice packs were applied before leaving therapy sessions.  - Injections in the arm during emergency room visit: Helped with pain (likely Ketorolac)  - Prednisone shot in the emergency room: About a week ago  - Cortisone shot in the shoulder: Approximately two years ago, for rotator cuff issues    - Previously drove a delivery truck  - Current work status unclear       On a scale of 1-10, with 10 being worst pain imaginable, he rates this pain as 6 on good days and 8 on bad days.  he describes the pain as sore achy and burning.    Review of patient's allergies indicates:   Allergen Reactions    Ace inhibitors  "Swelling    Iodine Itching    Verapamil Other (See Comments)     Other reaction(s): Unknown    Povidone-iodine Itching         Current Medications[1]    Past Medical History:   Diagnosis Date    Atrial fibrillation     CAD (coronary artery disease) 2006    MI in 2006    CHF (congestive heart failure) 2017    CKD (chronic kidney disease) stage 3, GFR 30-59 ml/min     Dr. Latif.  in transplanted kidney    CKD (chronic kidney disease) stage 5, GFR less than 15 ml/min 02/02/2024    COVID-19 02/07/2023    Diverticulosis     Hyperlipidemia     Hypertension     Keloid cicatrix     Metabolic bone disease     Other emphysema 10/01/2019    Pericarditis     S/P kidney transplant 1992    x2 (1992 & 2005),    Thrombocytopenia     Thyroid disease     Tobacco use 09/05/2014       Past Surgical History:   Procedure Laterality Date    AV FISTULA PLACEMENT Left 12/18/2023    Procedure: CREATION, AV FISTULA;  Surgeon: JUANI Melendez III, MD;  [facility];  Service: Vascular;  Laterality: Left;  LUE AVF creation vs AVG insertion    CARDIOVERSION  04/30/15    CHOLECYSTECTOMY      COLONOSCOPY  April 20, 2011    Diverticulosis, repeat recommended in 3 yrs., repeat colonoscopy 2014 revealed 2 polyps.  He should return in 5 years.    COLONOSCOPY N/A 3/13/2020    Procedure: COLONOSCOPY;  Surgeon: Chon Casper MD;  Location: [facility] (4TH FLR);  Service: Endoscopy;  Laterality: N/A;  ok to hold coumadin x5days-see telephone encounter 2/4/20-tb    COLONOSCOPY N/A 2/4/2021    Procedure: COLONOSCOPY;  Surgeon: Chon Casper MD;  Location: [facility] (4TH FLR);  Service: Endoscopy;  Laterality: N/A;  Eliquis - per Dr. GAVIN Blunt ok to hold x 2 days and "restarted asap"- ERW  last prep "poor", [id number] ordered/ Pt requests Dr. Casper  prep ins. mailed - COVID screening on 2/1/21 PCW- ERW    COLONOSCOPY N/A 3/3/2021    Procedure: COLONOSCOPY;  Surgeon: Chon Casper MD;  Location: [facility] (4TH FLR);  Service: " Endoscopy;  Laterality: N/A;  Patient with his second poor bowel pre and has poor prep today.  If patient not intersted or can't get colonoscopy tomorrow will need constipation bowel prep and will need to restart his Eliquis today.Thanks,Chon     per Dr Lopez to hold Eliquis 2 days prior      COVID     COLONOSCOPY N/A 12/6/2024    Procedure: COLONOSCOPY;  Surgeon: Chon Casper MD;  Location: [facility] (2ND FLR);  Service: Endoscopy;  Laterality: N/A;  Plavix-Eliquis pending/ HD T-Th-Sat- lab ordered  2/25/24 ECHO PA 49  ref by / [facility] portal-RN  11/29-message to clearance nurse for plavix and eliquis hold-tb  ok to hold Eliquis 2 days per Dr Parmar  ok to hold Plavix 5 days per MEENA CUNNINGHAM-GT  12/2- p    CORONARY ANGIOGRAPHY N/A 2/28/2024    Procedure: ANGIOGRAM, CORONARY ARTERY;  Surgeon: Tylor Lincoln MD;  Location: [facility] CATH LAB;  Service: Cardiology;  Laterality: N/A;    CORONARY ANGIOPLASTY WITH STENT PLACEMENT  9/13/2006    FISTULOGRAM N/A 2/19/2024    Procedure: Fistulogram;  Surgeon: JUANI Melendez III, MD;  Location: [facility] CATH LAB;  Service: Peripheral Vascular;  Laterality: N/A;    FISTULOGRAM, WITH PTA Left 6/3/2024    Procedure: FISTULOGRAM, WITH PTA;  Surgeon: JUANI Melendez III, MD;  Location: [facility] OR [facility] FLR;  Service: Vascular;  Laterality: Left;  mGy: 51.58  GyCm2: 6.8285  Fluoro time: 5.1 min  Contrast: 28 cc    FISTULOGRAM, WITH PTA Left 3/5/2025    Procedure: FISTULOGRAM, WITH PTA;  Surgeon: JUANI Melendez III, MD;  Location: [facility] OR [facility] FLR;  Service: Vascular;  Laterality: Left;    IRRIGATION AND DEBRIDEMENT Right 8/30/2023    Procedure: IRRIGATION AND DEBRIDEMENT, RIGHT MIDDLE FINGER;  Surgeon: Martin Larsen MD;  [facility];  Service: Orthopedics;  Laterality: Right;    KIDNEY TRANSPLANT  2005    LEFT HEART CATHETERIZATION N/A 2/26/2024    Procedure: Left heart cath;  Surgeon: Tylor Lincoln MD;  Location: [facility] CATH LAB;   Service: Cardiology;  Laterality: N/A;    PARATHYROIDECTOMY      PERCUTANEOUS TRANSLUMINAL ANGIOPLASTY OF ARTERIOVENOUS FISTULA Left 2/19/2024    Procedure: PTA, AV FISTULA;  Surgeon: JUANI Melendez III, MD;  Location: [facility] CATH LAB;  Service: Peripheral Vascular;  Laterality: Left;    STENT, DRUG ELUTING, SINGLE VESSEL, CORONARY N/A 2/28/2024    Procedure: Stent, Drug Eluting, Single Vessel, Coronary;  Surgeon: Tylor Lincoln MD;  Location: [facility] CATH LAB;  Service: Cardiology;  Laterality: N/A;    STENT, PERIPHERAL GRAFT Left 3/5/2025    Procedure: STENT, PERIPHERAL GRAFT;  Surgeon: JUANI Melendez III, MD;  [facility];  Service: Vascular;  Laterality: Left;  mGy 63.17  Gycm2 8.3386  flouro 63.17 min  contrast 38 ml    TREATMENT OF CARDIAC ARRHYTHMIA N/A 3/28/2022    Procedure: CARDIOVERSION;  Surgeon: Migue Blunt MD;  Location: [facility] EP LAB;  Service: Cardiology;  Laterality: N/A;  AF, DCC, MAC, GP, 3 PREP*RODRIGO deferred pt 100% compliant*    VENOPLASTY  6/3/2024    Procedure: ANGIOPLASTY, VEIN;  Surgeon: JUANI Melendez III, MD;  Location: [facility] OR [facility] FLR;  Service: Vascular;;  Left subclavian       Vital Signs (Most Recent)  There were no vitals filed for this visit.        Review of Systems:  ROS:  Patient Active Problem List    Diagnosis Date Noted    Decreased strength of trunk and back 06/18/2025    Severe sepsis 06/07/2025    Community acquired bacterial pneumonia 05/20/2025    Coronary artery disease involving native coronary artery of native heart without angina pectoris 05/09/2025    Hemodialysis-associated hypotension 05/06/2025    Orthostatic hypotension 05/05/2025    Tachycardia-bradycardia syndrome 09/12/2024    Mild aortic stenosis 09/12/2024    History of non-ST elevation myocardial infarction (NSTEMI) 09/12/2024    History of coronary angioplasty with insertion of stent 09/12/2024    Hypothyroidism 09/12/2024    Stenosis of arteriovenous dialysis fistula 03/22/2024     Calcified granuloma of lung 03/15/2024    Unspecified diastolic (congestive) heart failure 02/28/2024    Chronic obstructive pulmonary disease, unspecified 02/28/2024    Other emphysema 02/28/2024    Old myocardial infarction 02/28/2024    Anemia in chronic kidney disease 02/26/2024    Failed kidney transplant 02/24/2024     - see h/o kidney tranplant      Other disorders of phosphorus metabolism 02/24/2024    GERD (gastroesophageal reflux disease) 02/19/2024    ESRD (end stage renal disease) on dialysis 02/02/2024    NSTEMI (non-ST elevated myocardial infarction) 11/24/2023    Immunosuppressive management encounter following kidney transplant 11/23/2023    Benign essential tremor 10/06/2023    Immunocompromised state due to drug therapy 03/17/2023    Pulmonary hypertension 02/10/2023    Memory changes 10/31/2022     Memory changes possibly reflect changes from mild cognitive impairment however given the patient's multiple medical problems very likely this could relate to underlying depression.      Snoring 10/23/2022    Atherosclerotic heart disease of native coronary artery without angina pectoris 10/23/2022    Former smoker 07/04/2022    Postural tremor 09/16/2021     Worsening tremor over the last several months.  Patient would like to discuss medication options to keep his symptoms under control.  No evidence of parkinsonism causing his symptoms.      Polyneuropathy 09/16/2021    Carotid artery disease 03/23/2021    Multiple lung nodules on CT 06/09/2020    Paroxysmal atrial fibrillation 09/25/2017    Hypertensive heart and kidney disease 02/22/2017    Elevated troponin 01/22/2017    H/O kidney transplant 07/10/2015    Long term (current) use of anticoagulants [V58.61] 05/04/2015    Impingement syndrome of left shoulder 03/02/2015    Calcific tendinitis of left shoulder 01/21/2015    Left rotator cuff tear 01/21/2015    History of adenomatous polyp of colon 09/29/2014    Secondary renal hyperparathyroidism  05/19/2014    Carotid artery bruit 01/17/2014    Primary hypertension     Chest pain in patient with CAD (coronary artery disease)     Mixed hyperlipidemia      Active Problem List with Overview Notes    Diagnosis Date Noted    Decreased strength of trunk and back 06/18/2025    Severe sepsis 06/07/2025    Community acquired bacterial pneumonia 05/20/2025    Coronary artery disease involving native coronary artery of native heart without angina pectoris 05/09/2025    Hemodialysis-associated hypotension 05/06/2025    Orthostatic hypotension 05/05/2025    Tachycardia-bradycardia syndrome 09/12/2024    Mild aortic stenosis 09/12/2024    History of non-ST elevation myocardial infarction (NSTEMI) 09/12/2024    History of coronary angioplasty with insertion of stent 09/12/2024    Hypothyroidism 09/12/2024    Stenosis of arteriovenous dialysis fistula 03/22/2024    Calcified granuloma of lung 03/15/2024    Unspecified diastolic (congestive) heart failure 02/28/2024    Chronic obstructive pulmonary disease, unspecified 02/28/2024    Other emphysema 02/28/2024    Old myocardial infarction 02/28/2024    Anemia in chronic kidney disease 02/26/2024    Failed kidney transplant 02/24/2024     - see h/o kidney tranplant      Other disorders of phosphorus metabolism 02/24/2024    GERD (gastroesophageal reflux disease) 02/19/2024    ESRD (end stage renal disease) on dialysis 02/02/2024    NSTEMI (non-ST elevated myocardial infarction) 11/24/2023    Immunosuppressive management encounter following kidney transplant 11/23/2023    Benign essential tremor 10/06/2023    Immunocompromised state due to drug therapy 03/17/2023    Pulmonary hypertension 02/10/2023    Memory changes 10/31/2022     Memory changes possibly reflect changes from mild cognitive impairment however given the patient's multiple medical problems very likely this could relate to underlying depression.      Snoring 10/23/2022    Atherosclerotic heart disease of native  "coronary artery without angina pectoris 10/23/2022    Former smoker 07/04/2022    Postural tremor 09/16/2021     Worsening tremor over the last several months.  Patient would like to discuss medication options to keep his symptoms under control.  No evidence of parkinsonism causing his symptoms.      Polyneuropathy 09/16/2021    Carotid artery disease 03/23/2021    Multiple lung nodules on CT 06/09/2020    Paroxysmal atrial fibrillation 09/25/2017    Hypertensive heart and kidney disease 02/22/2017    Elevated troponin 01/22/2017    H/O kidney transplant 07/10/2015    Long term (current) use of anticoagulants [V58.61] 05/04/2015    Impingement syndrome of left shoulder 03/02/2015    Calcific tendinitis of left shoulder 01/21/2015    Left rotator cuff tear 01/21/2015    History of adenomatous polyp of colon 09/29/2014    Secondary renal hyperparathyroidism 05/19/2014    Carotid artery bruit 01/17/2014    Primary hypertension     Chest pain in patient with CAD (coronary artery disease)     Mixed hyperlipidemia            OBJECTIVE:     PHYSICAL EXAM:  Height: 6' 1" (185.4 cm) Weight: 94.3 kg (207 lb 12.5 oz), General Appearance: Well nourished, well developed, in no acute distress.  Neurological: Mood & affect are normal.  Back Exam:      Tenderness: Lumbar   Swelling:  None       Range of Motion:      Flexion: 50     Extension: 40     Lateral Bend:   Right 40                              Left 40     Rotation:          Right 40                              Left 50       SLR:                  Right Positive                             Left Positive       Muscle Strength      Abductor: 5/5     Adductor: 5/5     Quadriceps: 5/5     Hamstrings: 5/5       Sensation: Normal   Gait: Normal     RADIOGRAPHS:  Diffuse degenerative disc disease of the lumbar spine most severely affecting L3/L4 and L5/S1. Diffuse facet osteoarthritis most severely affecting L4/L5 and L5/S1. No spondylolisthesis in the neutral position. No change " in alignment with flexion or extension. There is mild levo scoliotic curvature of the lumbar spine.     ASSESSMENT/PLAN:       ICD-10-CM ICD-9-CM   1. Acute right-sided back pain with sciatica  M54.41 724.3   2. H/O kidney transplant  Z94.0 V42.0   3. CKD (chronic kidney disease), stage IV  N18.4 585.4   4. ESRD (end stage renal disease) on dialysis  N18.6 585.6    Z99.2 V45.11   5. Failed kidney transplant  T86.12 996.81   6. Lumbar radiculopathy  M54.16 724.4       Plan: We discussed with the patient at length all the different treatment options available for his spine including anti-inflammatories, acetaminophen, rest, ice, physical therapy, strengthening and range of motion exercise, occasional cortisone injections for temporary relief, and finally possible surgical interventions  Assessment & Plan    M51.26 Other intervertebral disc displacement, lumbar region  M54.16 Radiculopathy, lumbar region  M54.50 Low back pain, unspecified  M25.511 Pain in right shoulder  S46.099D Other injury of muscle(s) and tendon(s) of the rotator cuff of unspecified shoulder, subsequent encounter  Z99.2 Dependence on renal dialysis  N18.6 End stage renal disease  L02.91 Cutaneous abscess, unspecified    MEDICATIONS:  - Lidocaine patches for pain.    REFERRALS:  - Referred to spine specialist at [facility] for ablation or injection options.    PROCEDURES:  - Discussed spinal injection options and radiofrequency ablation for lower back pain relief.  - Recommend lidocaine patches as a non-invasive pain management option.    FOLLOW UP:  - Schedule appointment with spine specialist at [facility].              [1]   Current Outpatient Medications   Medication Sig Dispense Refill    acetaminophen (TYLENOL) 500 MG tablet Take 1 tablet (500 mg total) by mouth every 6 (six) hours as needed for Pain. 20 tablet 0    albuterol (VENTOLIN HFA) 90 mcg/actuation inhaler Inhale 2 puffs into the lungs every 6 (six) hours as needed for Wheezing or Shortness  of Breath. Rescue 18 g 3    albuterol-ipratropium (DUO-NEB) 2.5 mg-0.5 mg/3 mL nebulizer solution Take 3 mLs by nebulization every 4 (four) hours as needed for Wheezing or Shortness of Breath. Rescue 120 mL 0    amiodarone (PACERONE) 200 MG Tab TAKE 1 TABLET BY MOUTH EVERY DAY 90 tablet 3    atorvastatin (LIPITOR) 80 MG tablet Take 1 tablet (80 mg total) by mouth once daily. 90 tablet 1    b complex vitamins (B COMPLEX-VITAMIN B12) tablet Take 1 tablet by mouth once daily. 90 tablet 3    clopidogreL (PLAVIX) 75 mg tablet Take 1 tablet (75 mg total) by mouth once daily. 90 tablet 3    ELIQUIS 5 mg Tab TAKE 1 TABLET BY MOUTH TWICE A DAY 60 tablet 32    famotidine (PEPCID) 20 MG tablet Take 1 tablet (20 mg total) by mouth once daily. On dialysis days, please take this medicine after dialysis. Only taking on Tue, Thur, and  Sat. 12 tablet 3    fexofenadine HCl (ALLEGRA ORAL) Take 1 tablet by mouth daily as needed (allergies).      fluticasone furoate-vilanteroL (BREO) 100-25 mcg/dose diskus inhaler Inhale 1 puff into the lungs once daily. Controller. Use this inhaler every day.      fluticasone propionate (FLONASE) 50 mcg/actuation nasal spray 1 spray (50 mcg total) by Each Nostril route daily as needed for Allergies.      gabapentin (NEURONTIN) 300 MG capsule Take 1 capsule (300 mg total) by mouth every evening. 90 capsule 3    ipratropium (ATROVENT) 21 mcg (0.03 %) nasal spray 2 sprays by Each Nostril route 3 (three) times daily. 30 mL 0    LIDOcaine (LIDODERM) 5 % Place 1 patch onto the skin once daily. Remove & Discard patch within 12 hours or as directed by MD 30 patch 0    midodrine (PROAMATINE) 5 MG Tab Take 1 tablet (5 mg total) by mouth 3 (three) times daily as needed (if blood pressure less than 100/60 mmHg). 90 tablet 0    multivit-min-FA-lycopen-lutein (CENTRUM SILVER) 0.4 mg-300 mcg- 250 mcg Tab Take 1 tablet by mouth once daily.      nitroGLYCERIN (NITROSTAT) 0.4 MG SL tablet Place 1 tablet (0.4 mg total)  under the tongue every 5 (five) minutes as needed for Chest pain. 25 tablet 0    omeprazole (PRILOSEC) 20 MG capsule Take 1 capsule (20 mg total) by mouth daily as needed (heartburn).      pulse oximeter (PULSE OXIMETER) device by Apply Externally route 2 (two) times a day. Use twice daily at 8 AM and 3 PM and record the value in MyChart as directed. 1 each 0    sevelamer carbonate (RENVELA) 800 mg Tab This medicine is used to treat your phosphorus level. Please take 1 tablet (800 mg) once a day with the largest meal of the day. 30 tablet 11    torsemide (DEMADEX) 20 MG Tab Take 2 tablets (40 mg total) by mouth 3 (three) times a week. 90 tablet 3     No current facility-administered medications for this visit.

## 2025-07-21 ENCOUNTER — HOSPITAL ENCOUNTER (INPATIENT)
Facility: HOSPITAL | Age: 70
LOS: 1 days | Discharge: HOME | DRG: 280 | End: 2025-07-23
Attending: STUDENT IN AN ORGANIZED HEALTH CARE EDUCATION/TRAINING PROGRAM | Admitting: STUDENT IN AN ORGANIZED HEALTH CARE EDUCATION/TRAINING PROGRAM
Payer: MEDICARE

## 2025-07-21 ENCOUNTER — HOSPITAL ENCOUNTER (OUTPATIENT)
Dept: CARDIOLOGY | Facility: HOSPITAL | Age: 70
Discharge: HOME | End: 2025-07-21
Attending: STUDENT IN AN ORGANIZED HEALTH CARE EDUCATION/TRAINING PROGRAM
Payer: MEDICARE

## 2025-07-21 VITALS
BODY MASS INDEX: 29.02 KG/M2 | WEIGHT: 207.25 LBS | HEIGHT: 71 IN | HEART RATE: 60 BPM | DIASTOLIC BLOOD PRESSURE: 61 MMHG | SYSTOLIC BLOOD PRESSURE: 135 MMHG

## 2025-07-21 DIAGNOSIS — T86.10 KIDNEY TRANSPLANT AS CAUSE OF ABNORMAL REACTION OR LATER COMPLICATION: ICD-10-CM

## 2025-07-21 DIAGNOSIS — R07.9 CHEST PAIN, UNSPECIFIED TYPE: ICD-10-CM

## 2025-07-21 DIAGNOSIS — I25.9 CARDIAC ISCHEMIA: ICD-10-CM

## 2025-07-21 DIAGNOSIS — I27.20 HYPERTENSIVE PULMONARY VASCULAR DISEASE: ICD-10-CM

## 2025-07-21 DIAGNOSIS — R79.89 ELEVATED TROPONIN: ICD-10-CM

## 2025-07-21 DIAGNOSIS — R07.9 CHEST PAIN: ICD-10-CM

## 2025-07-21 DIAGNOSIS — R06.02 SOB (SHORTNESS OF BREATH): ICD-10-CM

## 2025-07-21 DIAGNOSIS — J44.1 COPD EXACERBATION: Primary | ICD-10-CM

## 2025-07-21 DIAGNOSIS — I10 HTN (HYPERTENSION), MALIGNANT: ICD-10-CM

## 2025-07-21 DIAGNOSIS — R09.02 HYPOXIA: ICD-10-CM

## 2025-07-21 DIAGNOSIS — R06.02 SHORTNESS OF BREATH: ICD-10-CM

## 2025-07-21 DIAGNOSIS — I21.4 NSTEMI (NON-ST ELEVATED MYOCARDIAL INFARCTION): ICD-10-CM

## 2025-07-21 DIAGNOSIS — N18.6 ESRD (END STAGE RENAL DISEASE): ICD-10-CM

## 2025-07-21 DIAGNOSIS — J44.9 CHRONIC OBSTRUCTIVE PULMONARY DISEASE, UNSPECIFIED COPD TYPE: ICD-10-CM

## 2025-07-21 DIAGNOSIS — J44.9 CHRONIC OBSTRUCTIVE PULMONARY DISEASE, UNSPECIFIED: Chronic | ICD-10-CM

## 2025-07-21 DIAGNOSIS — I25.10 CORONARY ARTERY DISEASE, UNSPECIFIED VESSEL OR LESION TYPE, UNSPECIFIED WHETHER ANGINA PRESENT, UNSPECIFIED WHETHER NATIVE OR TRANSPLANTED HEART: ICD-10-CM

## 2025-07-21 DIAGNOSIS — Z95.5 STENTED CORONARY ARTERY: ICD-10-CM

## 2025-07-21 LAB
ABSOLUTE EOSINOPHIL (OHS): 0.84 K/UL
ABSOLUTE MONOCYTE (OHS): 0.99 K/UL (ref 0.3–1)
ABSOLUTE NEUTROPHIL COUNT (OHS): 4.21 K/UL (ref 1.8–7.7)
ALBUMIN SERPL BCP-MCNC: 3.1 G/DL (ref 3.5–5.2)
ALP SERPL-CCNC: 93 UNIT/L (ref 40–150)
ALT SERPL W/O P-5'-P-CCNC: 16 UNIT/L (ref 10–44)
ANION GAP (OHS): 11 MMOL/L (ref 8–16)
AORTIC SIZE INDEX (SOV): 1.4 CM/M2
AORTIC SIZE INDEX: 1.5 CM/M2
ASCENDING AORTA: 3.3 CM
AST SERPL-CCNC: 24 UNIT/L (ref 11–45)
AV AREA BY CONTINUOUS VTI: 1.1 CM2
AV INDEX (PROSTH): 0.32
AV LVOT MEAN GRADIENT: 2 MMHG
AV LVOT PEAK GRADIENT: 3 MMHG
AV MEAN GRADIENT: 14 MMHG
AV PEAK GRADIENT: 25 MMHG
AV VALVE AREA BY VELOCITY RATIO: 1.1 CM²
AV VALVE AREA: 1.1 CM2
AV VELOCITY RATIO: 0.32
BASOPHILS # BLD AUTO: 0.04 K/UL
BASOPHILS NFR BLD AUTO: 0.5 %
BILIRUB SERPL-MCNC: 0.3 MG/DL (ref 0.1–1)
BIPAP: 0
BNP SERPL-MCNC: 373 PG/ML (ref 0–99)
BSA FOR ECHO PROCEDURE: 2.17 M2
BUN SERPL-MCNC: 35 MG/DL (ref 8–23)
CALCIUM SERPL-MCNC: 8.4 MG/DL (ref 8.7–10.5)
CHLORIDE SERPL-SCNC: 101 MMOL/L (ref 95–110)
CO2 SERPL-SCNC: 24 MMOL/L (ref 23–29)
CORRECTED TEMPERATURE (PCO2): 41.1 MMHG
CORRECTED TEMPERATURE (PH): 7.38
CORRECTED TEMPERATURE (PO2): 48.9 MMHG
CREAT SERPL-MCNC: 10.1 MG/DL (ref 0.5–1.4)
CV ECHO LV RWT: 0.35 CM
DOP CALC AO PEAK VEL: 2.5 M/S
DOP CALC AO VTI: 56.5 CM
DOP CALC LVOT AREA: 3.5 CM2
DOP CALC LVOT DIAMETER: 2.1 CM
DOP CALC LVOT PEAK VEL: 0.8 M/S
DOP CALC LVOT STROKE VOLUME: 63.4 CM3
DOP CALCLVOT PEAK VEL VTI: 18.3 CM
E WAVE DECELERATION TIME: 259 MS
E/A RATIO: 1.15
E/E' RATIO: 10 M/S
ECHO EF ESTIMATED: 47 %
ECHO LV POSTERIOR WALL: 0.8 CM (ref 0.6–1.1)
ERYTHROCYTE [DISTWIDTH] IN BLOOD BY AUTOMATED COUNT: 17 % (ref 11.5–14.5)
FIO2: 21 %
FRACTIONAL SHORTENING: 23.9 % (ref 28–44)
GFR SERPLBLD CREATININE-BSD FMLA CKD-EPI: 5 ML/MIN/1.73/M2
GLUCOSE SERPL-MCNC: 74 MG/DL (ref 70–110)
HCT VFR BLD AUTO: 36 % (ref 40–54)
HCT VFR BLD CALC: 34.2 % (ref 36–54)
HGB BLD-MCNC: 11.2 GM/DL (ref 14–18)
IMM GRANULOCYTES # BLD AUTO: 0.04 K/UL (ref 0–0.04)
IMM GRANULOCYTES NFR BLD AUTO: 0.5 % (ref 0–0.5)
INFLUENZA A MOLECULAR (OHS): NEGATIVE
INFLUENZA B MOLECULAR (OHS): NEGATIVE
INTERVENTRICULAR SEPTUM: 1.1 CM (ref 0.6–1.1)
IVC DIAMETER: 0.95 CM
IVRT: 106 MS
LA MAJOR: 4.9 CM
LA MINOR: 5.2 CM
LA WIDTH: 3.8 CM
LDH SERPL L TO P-CCNC: 1.3 MMOL/L (ref 0.5–2.2)
LEFT ATRIUM SIZE: 3.8 CM
LEFT ATRIUM VOLUME INDEX MOD: 23 ML/M2
LEFT ATRIUM VOLUME INDEX: 29 ML/M2
LEFT ATRIUM VOLUME MOD: 50 ML
LEFT ATRIUM VOLUME: 62 CM3
LEFT INTERNAL DIMENSION IN SYSTOLE: 3.5 CM (ref 2.1–4)
LEFT VENTRICLE DIASTOLIC VOLUME INDEX: 44.86 ML/M2
LEFT VENTRICLE DIASTOLIC VOLUME: 96 ML
LEFT VENTRICLE MASS INDEX: 69.2 G/M2
LEFT VENTRICLE SYSTOLIC VOLUME INDEX: 23.8 ML/M2
LEFT VENTRICLE SYSTOLIC VOLUME: 51 ML
LEFT VENTRICULAR INTERNAL DIMENSION IN DIASTOLE: 4.6 CM (ref 3.5–6)
LEFT VENTRICULAR MASS: 148.1 G
LV LATERAL E/E' RATIO: 7.7
LV SEPTAL E/E' RATIO: 13.8
LYMPHOCYTES # BLD AUTO: 2.21 K/UL (ref 1–4.8)
Lab: 1.7 CM/M
Lab: 1.8 CM/M
MAGNESIUM SERPL-MCNC: 2 MG/DL (ref 1.6–2.6)
MCH RBC QN AUTO: 26.9 PG (ref 27–31)
MCHC RBC AUTO-ENTMCNC: 31.1 G/DL (ref 32–36)
MCV RBC AUTO: 87 FL (ref 82–98)
MV A" WAVE DURATION": 194.1 MS
MV PEAK A VEL: 0.6 M/S
MV PEAK E VEL: 0.69 M/S
NUCLEATED RBC (/100WBC) (OHS): 0 /100 WBC
OHS CV CPX PATIENT HEIGHT IN: 70.87
OHS CV RV/LV RATIO: 0.63 CM
OHS QRS DURATION: 104 MS
OHS QRS DURATION: 108 MS
OHS QTC CALCULATION: 447 MS
OHS QTC CALCULATION: 473 MS
PCO2 BLDA: 41.1 MMHG (ref 35–45)
PH SMN: 7.38 [PH] (ref 7.35–7.45)
PISA TR MAX VEL: 3.2 M/S
PLATELET # BLD AUTO: 157 K/UL (ref 150–450)
PMV BLD AUTO: 12.3 FL (ref 9.2–12.9)
PO2 BLDA: 48.9 MMHG (ref 40–60)
POC BASE DEFICIT: -1 MMOL/L
POC HCO3: 23.3 MMOL/L (ref 24–28)
POC PERFORMED BY: NORMAL
POC TEMPERATURE: 37 C
POTASSIUM SERPL-SCNC: 4.7 MMOL/L (ref 3.5–5.1)
PROCALCITONIN SERPL-MCNC: 0.65 NG/ML
PROT SERPL-MCNC: 7.1 GM/DL (ref 6–8.4)
PULM VEIN A" WAVE DURATION": 194.1 MS
PULM VEIN S/D RATIO: 1.43
PULMONIC VEIN PEAK A VELOCITY: 0.2 M/S
PV PEAK D VEL: 0.28 M/S
PV PEAK S VEL: 0.4 M/S
RA MAJOR: 4.95 CM
RA PRESSURE ESTIMATED: 3 MMHG
RA WIDTH: 3.64 CM
RBC # BLD AUTO: 4.16 M/UL (ref 4.6–6.2)
RELATIVE EOSINOPHIL (OHS): 10.1 %
RELATIVE LYMPHOCYTE (OHS): 26.5 % (ref 18–48)
RELATIVE MONOCYTE (OHS): 11.9 % (ref 4–15)
RELATIVE NEUTROPHIL (OHS): 50.5 % (ref 38–73)
RIGHT ATRIAL AREA: 15 CM2
RIGHT VENTRICLE DIASTOLIC BASEL DIMENSION: 2.9 CM
RV TB RVSP: 6 MMHG
RV TISSUE DOPPLER FREE WALL SYSTOLIC VELOCITY 1 (APICAL 4 CHAMBER VIEW): 12.05 CM/S
SARS-COV-2 RDRP RESP QL NAA+PROBE: NEGATIVE
SINUS: 3.1 CM
SODIUM SERPL-SCNC: 136 MMOL/L (ref 136–145)
SPECIMEN SOURCE: NORMAL
STJ: 2.9 CM
TDI LATERAL: 0.09 M/S
TDI SEPTAL: 0.05 M/S
TDI: 0.07 M/S
TRICUSPID ANNULAR PLANE SYSTOLIC EXCURSION: 1.6 CM
TROPONIN I SERPL HS-MCNC: 54 NG/L
TROPONIN I SERPL HS-MCNC: 56 NG/L
TROPONIN I SERPL HS-MCNC: 62 NG/L
TROPONIN I SERPL HS-MCNC: 64 NG/L
TROPONIN I SERPL HS-MCNC: 65 NG/L
TV PEAK GRADIENT: 41 MMHG
TV REST PULMONARY ARTERY PRESSURE: 44 MMHG
WBC # BLD AUTO: 8.33 K/UL (ref 3.9–12.7)
Z-SCORE OF LEFT VENTRICULAR DIMENSION IN END DIASTOLE: -4.03
Z-SCORE OF LEFT VENTRICULAR DIMENSION IN END SYSTOLE: -1.43

## 2025-07-21 PROCEDURE — 99214 OFFICE O/P EST MOD 30 MIN: CPT | Mod: HCNC,GC,, | Performed by: INTERNAL MEDICINE

## 2025-07-21 PROCEDURE — 25000003 PHARM REV CODE 250: Mod: HCNC | Performed by: PHYSICIAN ASSISTANT

## 2025-07-21 PROCEDURE — 99900035 HC TECH TIME PER 15 MIN (STAT): Mod: HCNC

## 2025-07-21 PROCEDURE — 83880 ASSAY OF NATRIURETIC PEPTIDE: CPT | Mod: HCNC | Performed by: STUDENT IN AN ORGANIZED HEALTH CARE EDUCATION/TRAINING PROGRAM

## 2025-07-21 PROCEDURE — 83735 ASSAY OF MAGNESIUM: CPT | Mod: HCNC | Performed by: PHYSICIAN ASSISTANT

## 2025-07-21 PROCEDURE — 84145 PROCALCITONIN (PCT): CPT | Mod: HCNC | Performed by: PHYSICIAN ASSISTANT

## 2025-07-21 PROCEDURE — 94799 UNLISTED PULMONARY SVC/PX: CPT | Mod: HCNC

## 2025-07-21 PROCEDURE — G0378 HOSPITAL OBSERVATION PER HR: HCPCS | Mod: HCNC

## 2025-07-21 PROCEDURE — 93306 TTE W/DOPPLER COMPLETE: CPT | Mod: 26,HCNC,, | Performed by: STUDENT IN AN ORGANIZED HEALTH CARE EDUCATION/TRAINING PROGRAM

## 2025-07-21 PROCEDURE — 84484 ASSAY OF TROPONIN QUANT: CPT | Mod: HCNC | Performed by: STUDENT IN AN ORGANIZED HEALTH CARE EDUCATION/TRAINING PROGRAM

## 2025-07-21 PROCEDURE — 84484 ASSAY OF TROPONIN QUANT: CPT | Mod: 91,HCNC | Performed by: STUDENT IN AN ORGANIZED HEALTH CARE EDUCATION/TRAINING PROGRAM

## 2025-07-21 PROCEDURE — 83605 ASSAY OF LACTIC ACID: CPT | Mod: HCNC

## 2025-07-21 PROCEDURE — 25000242 PHARM REV CODE 250 ALT 637 W/ HCPCS: Mod: HCNC | Performed by: STUDENT IN AN ORGANIZED HEALTH CARE EDUCATION/TRAINING PROGRAM

## 2025-07-21 PROCEDURE — 93306 TTE W/DOPPLER COMPLETE: CPT | Mod: HCNC

## 2025-07-21 PROCEDURE — 82803 BLOOD GASES ANY COMBINATION: CPT | Mod: HCNC

## 2025-07-21 PROCEDURE — 87502 INFLUENZA DNA AMP PROBE: CPT | Mod: HCNC | Performed by: STUDENT IN AN ORGANIZED HEALTH CARE EDUCATION/TRAINING PROGRAM

## 2025-07-21 PROCEDURE — 80053 COMPREHEN METABOLIC PANEL: CPT | Mod: HCNC | Performed by: STUDENT IN AN ORGANIZED HEALTH CARE EDUCATION/TRAINING PROGRAM

## 2025-07-21 PROCEDURE — 84484 ASSAY OF TROPONIN QUANT: CPT | Mod: 91,HCNC | Performed by: PHYSICIAN ASSISTANT

## 2025-07-21 PROCEDURE — 93010 ELECTROCARDIOGRAM REPORT: CPT | Mod: HCNC,,, | Performed by: INTERNAL MEDICINE

## 2025-07-21 PROCEDURE — 85025 COMPLETE CBC W/AUTO DIFF WBC: CPT | Mod: HCNC | Performed by: STUDENT IN AN ORGANIZED HEALTH CARE EDUCATION/TRAINING PROGRAM

## 2025-07-21 PROCEDURE — 99285 EMERGENCY DEPT VISIT HI MDM: CPT | Mod: 25,HCNC

## 2025-07-21 PROCEDURE — U0002 COVID-19 LAB TEST NON-CDC: HCPCS | Mod: HCNC | Performed by: STUDENT IN AN ORGANIZED HEALTH CARE EDUCATION/TRAINING PROGRAM

## 2025-07-21 PROCEDURE — 93005 ELECTROCARDIOGRAM TRACING: CPT | Mod: HCNC

## 2025-07-21 PROCEDURE — 96374 THER/PROPH/DIAG INJ IV PUSH: CPT | Mod: HCNC

## 2025-07-21 PROCEDURE — 63600175 PHARM REV CODE 636 W HCPCS: Mod: JZ,TB,HCNC | Performed by: STUDENT IN AN ORGANIZED HEALTH CARE EDUCATION/TRAINING PROGRAM

## 2025-07-21 PROCEDURE — 94761 N-INVAS EAR/PLS OXIMETRY MLT: CPT | Mod: HCNC

## 2025-07-21 PROCEDURE — 94640 AIRWAY INHALATION TREATMENT: CPT | Mod: HCNC

## 2025-07-21 PROCEDURE — 25000242 PHARM REV CODE 250 ALT 637 W/ HCPCS: Mod: HCNC | Performed by: PHYSICIAN ASSISTANT

## 2025-07-21 PROCEDURE — 94640 AIRWAY INHALATION TREATMENT: CPT | Mod: HCNC,XB

## 2025-07-21 RX ORDER — AMIODARONE HYDROCHLORIDE 200 MG/1
200 TABLET ORAL DAILY
Status: DISCONTINUED | OUTPATIENT
Start: 2025-07-21 | End: 2025-07-23 | Stop reason: HOSPADM

## 2025-07-21 RX ORDER — METHYLPREDNISOLONE SOD SUCC 125 MG
125 VIAL (EA) INJECTION
Status: COMPLETED | OUTPATIENT
Start: 2025-07-21 | End: 2025-07-21

## 2025-07-21 RX ORDER — SEVELAMER CARBONATE 800 MG/1
800 TABLET, FILM COATED ORAL
Status: DISCONTINUED | OUTPATIENT
Start: 2025-07-21 | End: 2025-07-23 | Stop reason: HOSPADM

## 2025-07-21 RX ORDER — SODIUM CHLORIDE 0.9 % (FLUSH) 0.9 %
3 SYRINGE (ML) INJECTION
Status: DISCONTINUED | OUTPATIENT
Start: 2025-07-21 | End: 2025-07-23 | Stop reason: HOSPADM

## 2025-07-21 RX ORDER — IPRATROPIUM BROMIDE AND ALBUTEROL SULFATE 2.5; .5 MG/3ML; MG/3ML
3 SOLUTION RESPIRATORY (INHALATION)
Status: DISCONTINUED | OUTPATIENT
Start: 2025-07-21 | End: 2025-07-21

## 2025-07-21 RX ORDER — ATORVASTATIN CALCIUM 40 MG/1
80 TABLET, FILM COATED ORAL DAILY
Status: DISCONTINUED | OUTPATIENT
Start: 2025-07-21 | End: 2025-07-23 | Stop reason: HOSPADM

## 2025-07-21 RX ORDER — ALUMINUM HYDROXIDE, MAGNESIUM HYDROXIDE, AND SIMETHICONE 2400; 240; 2400 MG/30ML; MG/30ML; MG/30ML
30 SUSPENSION ORAL EVERY 6 HOURS PRN
Status: DISCONTINUED | OUTPATIENT
Start: 2025-07-21 | End: 2025-07-23 | Stop reason: HOSPADM

## 2025-07-21 RX ORDER — CLOPIDOGREL BISULFATE 75 MG/1
75 TABLET ORAL DAILY
Status: DISCONTINUED | OUTPATIENT
Start: 2025-07-21 | End: 2025-07-23 | Stop reason: HOSPADM

## 2025-07-21 RX ORDER — MUPIROCIN 20 MG/G
OINTMENT TOPICAL 2 TIMES DAILY
Status: CANCELLED | OUTPATIENT
Start: 2025-07-22 | End: 2025-07-27

## 2025-07-21 RX ORDER — IPRATROPIUM BROMIDE AND ALBUTEROL SULFATE 2.5; .5 MG/3ML; MG/3ML
3 SOLUTION RESPIRATORY (INHALATION) EVERY 4 HOURS PRN
Status: DISCONTINUED | OUTPATIENT
Start: 2025-07-21 | End: 2025-07-23 | Stop reason: HOSPADM

## 2025-07-21 RX ORDER — ALBUTEROL SULFATE 2.5 MG/.5ML
15 SOLUTION RESPIRATORY (INHALATION)
Status: COMPLETED | OUTPATIENT
Start: 2025-07-21 | End: 2025-07-21

## 2025-07-21 RX ORDER — HYDRALAZINE HYDROCHLORIDE 25 MG/1
25 TABLET, FILM COATED ORAL EVERY 8 HOURS PRN
Status: DISCONTINUED | OUTPATIENT
Start: 2025-07-21 | End: 2025-07-23

## 2025-07-21 RX ORDER — IPRATROPIUM BROMIDE AND ALBUTEROL SULFATE 2.5; .5 MG/3ML; MG/3ML
SOLUTION RESPIRATORY (INHALATION)
Status: DISCONTINUED
Start: 2025-07-21 | End: 2025-07-21

## 2025-07-21 RX ORDER — ACETAMINOPHEN 325 MG/1
650 TABLET ORAL EVERY 8 HOURS PRN
Status: DISCONTINUED | OUTPATIENT
Start: 2025-07-21 | End: 2025-07-23 | Stop reason: HOSPADM

## 2025-07-21 RX ORDER — PROCHLORPERAZINE EDISYLATE 5 MG/ML
5 INJECTION INTRAMUSCULAR; INTRAVENOUS EVERY 6 HOURS PRN
Status: DISCONTINUED | OUTPATIENT
Start: 2025-07-21 | End: 2025-07-23 | Stop reason: HOSPADM

## 2025-07-21 RX ORDER — FLUTICASONE FUROATE AND VILANTEROL 100; 25 UG/1; UG/1
1 POWDER RESPIRATORY (INHALATION) DAILY
Status: DISCONTINUED | OUTPATIENT
Start: 2025-07-21 | End: 2025-07-23 | Stop reason: HOSPADM

## 2025-07-21 RX ORDER — FAMOTIDINE 20 MG/1
20 TABLET, FILM COATED ORAL DAILY
Status: DISCONTINUED | OUTPATIENT
Start: 2025-07-21 | End: 2025-07-21

## 2025-07-21 RX ORDER — ONDANSETRON 8 MG/1
8 TABLET, ORALLY DISINTEGRATING ORAL EVERY 8 HOURS PRN
Status: DISCONTINUED | OUTPATIENT
Start: 2025-07-21 | End: 2025-07-23 | Stop reason: HOSPADM

## 2025-07-21 RX ORDER — GABAPENTIN 300 MG/1
300 CAPSULE ORAL NIGHTLY
Status: DISCONTINUED | OUTPATIENT
Start: 2025-07-21 | End: 2025-07-23 | Stop reason: HOSPADM

## 2025-07-21 RX ORDER — FAMOTIDINE 20 MG/1
40 TABLET, FILM COATED ORAL DAILY
Status: DISCONTINUED | OUTPATIENT
Start: 2025-07-22 | End: 2025-07-22

## 2025-07-21 RX ORDER — MIDODRINE HYDROCHLORIDE 5 MG/1
5 TABLET ORAL 3 TIMES DAILY PRN
Status: DISCONTINUED | OUTPATIENT
Start: 2025-07-21 | End: 2025-07-23 | Stop reason: HOSPADM

## 2025-07-21 RX ORDER — IPRATROPIUM BROMIDE AND ALBUTEROL SULFATE 2.5; .5 MG/3ML; MG/3ML
3 SOLUTION RESPIRATORY (INHALATION)
Status: COMPLETED | OUTPATIENT
Start: 2025-07-21 | End: 2025-07-21

## 2025-07-21 RX ORDER — ONDANSETRON 8 MG/1
8 TABLET, ORALLY DISINTEGRATING ORAL EVERY 8 HOURS PRN
Status: DISCONTINUED | OUTPATIENT
Start: 2025-07-21 | End: 2025-07-21 | Stop reason: SDUPTHER

## 2025-07-21 RX ORDER — PREDNISONE 20 MG/1
40 TABLET ORAL DAILY
Status: DISCONTINUED | OUTPATIENT
Start: 2025-07-22 | End: 2025-07-23 | Stop reason: HOSPADM

## 2025-07-21 RX ORDER — NITROGLYCERIN 0.4 MG/1
0.4 TABLET SUBLINGUAL EVERY 5 MIN PRN
Status: DISCONTINUED | OUTPATIENT
Start: 2025-07-21 | End: 2025-07-23 | Stop reason: HOSPADM

## 2025-07-21 RX ORDER — HEPARIN SODIUM 1000 [USP'U]/ML
1000 INJECTION, SOLUTION INTRAVENOUS; SUBCUTANEOUS
Status: CANCELLED | OUTPATIENT
Start: 2025-07-22 | End: 2025-07-23

## 2025-07-21 RX ORDER — TORSEMIDE 20 MG/1
40 TABLET ORAL
Status: DISCONTINUED | OUTPATIENT
Start: 2025-07-21 | End: 2025-07-23 | Stop reason: HOSPADM

## 2025-07-21 RX ORDER — HEPARIN SODIUM 5000 [USP'U]/ML
5000 INJECTION, SOLUTION INTRAVENOUS; SUBCUTANEOUS EVERY 8 HOURS
Status: DISCONTINUED | OUTPATIENT
Start: 2025-07-21 | End: 2025-07-21

## 2025-07-21 RX ORDER — IPRATROPIUM BROMIDE AND ALBUTEROL SULFATE 2.5; .5 MG/3ML; MG/3ML
3 SOLUTION RESPIRATORY (INHALATION)
Status: DISCONTINUED | OUTPATIENT
Start: 2025-07-21 | End: 2025-07-23

## 2025-07-21 RX ADMIN — GABAPENTIN 300 MG: 300 CAPSULE ORAL at 09:07

## 2025-07-21 RX ADMIN — METHYLPREDNISOLONE SODIUM SUCCINATE 125 MG: 125 INJECTION, POWDER, FOR SOLUTION INTRAMUSCULAR; INTRAVENOUS at 12:07

## 2025-07-21 RX ADMIN — NITROGLYCERIN 0.4 MG: 0.4 TABLET, ORALLY DISINTEGRATING SUBLINGUAL at 05:07

## 2025-07-21 RX ADMIN — IPRATROPIUM BROMIDE AND ALBUTEROL SULFATE 3 ML: .5; 3 SOLUTION RESPIRATORY (INHALATION) at 08:07

## 2025-07-21 RX ADMIN — ALBUTEROL SULFATE 15 MG: 2.5 SOLUTION RESPIRATORY (INHALATION) at 12:07

## 2025-07-21 RX ADMIN — FLUTICASONE FUROATE AND VILANTEROL TRIFENATATE 1 PUFF: 100; 25 POWDER RESPIRATORY (INHALATION) at 02:07

## 2025-07-21 RX ADMIN — AMIODARONE HYDROCHLORIDE 200 MG: 200 TABLET ORAL at 02:07

## 2025-07-21 RX ADMIN — FAMOTIDINE 20 MG: 20 TABLET, FILM COATED ORAL at 02:07

## 2025-07-21 RX ADMIN — IPRATROPIUM BROMIDE AND ALBUTEROL SULFATE 3 ML: 2.5; .5 SOLUTION RESPIRATORY (INHALATION) at 07:07

## 2025-07-21 RX ADMIN — ALUMINUM HYDROXIDE, MAGNESIUM HYDROXIDE, AND DIMETHICONE 30 ML: 400; 400; 40 SUSPENSION ORAL at 06:07

## 2025-07-21 RX ADMIN — CLOPIDOGREL 75 MG: 75 TABLET ORAL at 02:07

## 2025-07-21 RX ADMIN — SEVELAMER CARBONATE 800 MG: 800 TABLET, FILM COATED ORAL at 05:07

## 2025-07-21 RX ADMIN — HYDRALAZINE HYDROCHLORIDE 25 MG: 25 TABLET ORAL at 02:07

## 2025-07-21 RX ADMIN — TORSEMIDE 40 MG: 20 TABLET ORAL at 02:07

## 2025-07-21 RX ADMIN — APIXABAN 5 MG: 5 TABLET, FILM COATED ORAL at 09:07

## 2025-07-21 RX ADMIN — ATORVASTATIN CALCIUM 80 MG: 40 TABLET, FILM COATED ORAL at 02:07

## 2025-07-21 RX ADMIN — IPRATROPIUM BROMIDE AND ALBUTEROL SULFATE 3 ML: 2.5; .5 SOLUTION RESPIRATORY (INHALATION) at 03:07

## 2025-07-21 RX ADMIN — NITROGLYCERIN 0.4 MG: 0.4 TABLET, ORALLY DISINTEGRATING SUBLINGUAL at 08:07

## 2025-07-21 NOTE — ED NOTES
Patient complaining of burning chest pain. 1 dose of nitroglycerin given. Chest pain resolved after nitroglycerin. EKG obtained and mused into the chart. Dr. Phelps and MARISA Barriga aware. States will consult cardiology.

## 2025-07-21 NOTE — ASSESSMENT & PLAN NOTE
Patient's COPD is with exacerbation noted by continued dyspnea currently.  Patient is currently on COPD Pathway. Continue scheduled inhalers Steroids and Supplemental oxygen and monitor respiratory status closely.   - O2 sat 97% on RA  - No leukocytosis  - CXR: Heart size normal.  The lungs are clear.  No pleural effusion.   - ECG w/ NSR, no acute ischemia.   - s/p duonebs and IV solumedrol  - continue prednisone 40 mg qd  - duonebs q4h   - O2 NC prn  - incentive spirometry  - continue monitoring

## 2025-07-21 NOTE — ASSESSMENT & PLAN NOTE
Anemia is likely due to chronic disease due to ESRD. Most recent hemoglobin and hematocrit are listed below.  Recent Labs     07/21/25  0904 07/21/25  0917   HGB 11.2*  --    HCT 36.0* 34.2     Plan  - Monitor serial CBC: Daily  - Transfuse PRBC if patient becomes hemodynamically unstable, symptomatic or H/H drops below 7/21.  - Patient has not received any PRBC transfusions to date  - Patient's anemia is currently stable

## 2025-07-21 NOTE — SUBJECTIVE & OBJECTIVE
"Past Medical History:   Diagnosis Date    Atrial fibrillation     CAD (coronary artery disease) 2006    MI in 2006    CHF (congestive heart failure) 2017    CKD (chronic kidney disease) stage 3, GFR 30-59 ml/min     Dr. Latif.  in transplanted kidney    CKD (chronic kidney disease) stage 5, GFR less than 15 ml/min 02/02/2024    COVID-19 02/07/2023    Diverticulosis     Hyperlipidemia     Hypertension     Keloid cicatrix     Metabolic bone disease     Other emphysema 10/01/2019    Pericarditis     S/P kidney transplant 1992    x2 (1992 & 2005),    Thrombocytopenia     Thyroid disease     Tobacco use 09/05/2014       Past Surgical History:   Procedure Laterality Date    AV FISTULA PLACEMENT Left 12/18/2023    Procedure: CREATION, AV FISTULA;  Surgeon: JUANI Melendez III, MD;  [facility];  Service: Vascular;  Laterality: Left;  LUE AVF creation vs AVG insertion    CARDIOVERSION  04/30/15    CHOLECYSTECTOMY      COLONOSCOPY  April 20, 2011    Diverticulosis, repeat recommended in 3 yrs., repeat colonoscopy 2014 revealed 2 polyps.  He should return in 5 years.    COLONOSCOPY N/A 3/13/2020    Procedure: COLONOSCOPY;  Surgeon: Chon Casper MD;  Location: [facility] (4TH FLR);  Service: Endoscopy;  Laterality: N/A;  ok to hold coumadin x5days-see telephone encounter 2/4/20-tb    COLONOSCOPY N/A 2/4/2021    Procedure: COLONOSCOPY;  Surgeon: Chon Casper MD;  Location: [facility] (4TH FLR);  Service: Endoscopy;  Laterality: N/A;  Eliquis - per Dr. GAVIN Blunt ok to hold x 2 days and "restarted asap"- ERW  last prep "poor", [id number] ordered/ Pt requests Dr. Casper  prep ins. mailed - COVID screening on 2/1/21 PCW- ERW    COLONOSCOPY N/A 3/3/2021    Procedure: COLONOSCOPY;  Surgeon: Chon Casper MD;  Location: [facility] (4TH FLR);  Service: Endoscopy;  Laterality: N/A;  Patient with his second poor bowel pre and has poor prep today.  If patient not intersted or can't get colonoscopy tomorrow will " need constipation bowel prep and will need to restart his Eliquis today.Thanks,Chon     per Dr Blunt-ok to hold Eliquis 2 days prior      COVID     COLONOSCOPY N/A 12/6/2024    Procedure: COLONOSCOPY;  Surgeon: Chon Casper MD;  Location: [facility] (2ND FLR);  Service: Endoscopy;  Laterality: N/A;  Plavix-Eliquis pending/ HD T-Th-Sat- lab ordered  2/25/24 ECHO PA 49  ref by / [facility] portal-RN  11/29-message to clearance nurse for plavix and eliquis hold-tb  ok to hold Eliquis 2 days per Dr Parmar  ok to hold Plavix 5 days per MEENA COLVINGT  12/2- p    CORONARY ANGIOGRAPHY N/A 2/28/2024    Procedure: ANGIOGRAM, CORONARY ARTERY;  Surgeon: Tylor Lincoln MD;  Location: [facility] CATH LAB;  Service: Cardiology;  Laterality: N/A;    CORONARY ANGIOPLASTY WITH STENT PLACEMENT  9/13/2006    FISTULOGRAM N/A 2/19/2024    Procedure: Fistulogram;  Surgeon: JUANI Melendez III, MD;  Location: [facility] CATH LAB;  Service: Peripheral Vascular;  Laterality: N/A;    FISTULOGRAM, WITH PTA Left 6/3/2024    Procedure: FISTULOGRAM, WITH PTA;  Surgeon: JUANI Melendez III, MD;  [facility];  Service: Vascular;  Laterality: Left;  mGy: 51.58  GyCm2: 6.8285  Fluoro time: 5.1 min  Contrast: 28 cc    FISTULOGRAM, WITH PTA Left 3/5/2025    Procedure: FISTULOGRAM, WITH PTA;  Surgeon: JUANI Melendez III, MD;  [facility];  Service: Vascular;  Laterality: Left;    IRRIGATION AND DEBRIDEMENT Right 8/30/2023    Procedure: IRRIGATION AND DEBRIDEMENT, RIGHT MIDDLE FINGER;  Surgeon: Martin Larsen MD;  [facility];  Service: Orthopedics;  Laterality: Right;    KIDNEY TRANSPLANT  2005    LEFT HEART CATHETERIZATION N/A 2/26/2024    Procedure: Left heart cath;  Surgeon: Tylor Lincoln MD;  Location: [facility] CATH LAB;  Service: Cardiology;  Laterality: N/A;    PARATHYROIDECTOMY      PERCUTANEOUS TRANSLUMINAL ANGIOPLASTY OF ARTERIOVENOUS FISTULA Left 2/19/2024    Procedure: PTA,  AV FISTULA;  Surgeon: JUANI Melendez III, MD;  Location: [facility] CATH LAB;  Service: Peripheral Vascular;  Laterality: Left;    STENT, DRUG ELUTING, SINGLE VESSEL, CORONARY N/A 2/28/2024    Procedure: Stent, Drug Eluting, Single Vessel, Coronary;  Surgeon: Tylor Lincoln MD;  Location: [facility] CATH LAB;  Service: Cardiology;  Laterality: N/A;    STENT, PERIPHERAL GRAFT Left 3/5/2025    Procedure: STENT, PERIPHERAL GRAFT;  Surgeon: JUANI Melendez III, MD;  [facility];  Service: Vascular;  Laterality: Left;  mGy 63.17  Gycm2 8.3386  flouro 63.17 min  contrast 38 ml    TREATMENT OF CARDIAC ARRHYTHMIA N/A 3/28/2022    Procedure: CARDIOVERSION;  Surgeon: Migue Blunt MD;  Location: [facility] EP LAB;  Service: Cardiology;  Laterality: N/A;  AF, DCC, MAC, GP, 3 PREP*RODRIGO deferred pt 100% compliant*    VENOPLASTY  6/3/2024    Procedure: ANGIOPLASTY, VEIN;  Surgeon: JUANI Melendez III, MD;  [facility];  Service: Vascular;;  Left subclavian       Review of patient's allergies indicates:   Allergen Reactions    Ace inhibitors Swelling    Iodine Itching    Verapamil Other (See Comments)     Other reaction(s): Unknown    Povidone-iodine Itching     Current Facility-Administered Medications   Medication Frequency    acetaminophen tablet 650 mg Q8H PRN    albuterol-ipratropium 2.5 mg-0.5 mg/3 mL nebulizer solution 3 mL Q4H WAKE    amiodarone tablet 200 mg Daily    apixaban tablet 5 mg BID    atorvastatin tablet 80 mg Daily    clopidogreL tablet 75 mg Daily    famotidine tablet 20 mg Daily    fluticasone furoate-vilanteroL 100-25 mcg/dose diskus inhaler 1 puff Daily    gabapentin capsule 300 mg QHS    hydrALAZINE tablet 25 mg Q8H PRN    midodrine tablet 5 mg TID PRN    nitroGLYCERIN SL tablet 0.4 mg Q5 Min PRN    ondansetron disintegrating tablet 8 mg Q8H PRN    [START ON 7/22/2025] predniSONE tablet 40 mg Daily    sevelamer carbonate tablet 800 mg Daily with dinner    sodium chloride 0.9% flush 3 mL  PRN    torsemide tablet 40 mg Once per day on Monday Wednesday Friday     Current Outpatient Medications   Medication    acetaminophen (TYLENOL) 500 MG tablet    albuterol (VENTOLIN HFA) 90 mcg/actuation inhaler    albuterol-ipratropium (DUO-NEB) 2.5 mg-0.5 mg/3 mL nebulizer solution    amiodarone (PACERONE) 200 MG Tab    atorvastatin (LIPITOR) 80 MG tablet    b complex vitamins (B COMPLEX-VITAMIN B12) tablet    clopidogreL (PLAVIX) 75 mg tablet    ELIQUIS 5 mg Tab    famotidine (PEPCID) 20 MG tablet    fexofenadine HCl (ALLEGRA ORAL)    fluticasone furoate-vilanteroL (BREO) 100-25 mcg/dose diskus inhaler    fluticasone propionate (FLONASE) 50 mcg/actuation nasal spray    gabapentin (NEURONTIN) 300 MG capsule    ipratropium (ATROVENT) 21 mcg (0.03 %) nasal spray    LIDOcaine (LIDODERM) 5 %    midodrine (PROAMATINE) 5 MG Tab    multivit-min-FA-lycopen-lutein (CENTRUM SILVER) 0.4 mg-300 mcg- 250 mcg Tab    nitroGLYCERIN (NITROSTAT) 0.4 MG SL tablet    omeprazole (PRILOSEC) 20 MG capsule    sevelamer carbonate (RENVELA) 800 mg Tab    torsemide (DEMADEX) 20 MG Tab     Family History       Problem Relation (Age of Onset)    Heart disease Father    Kidney disease Sister, Brother    Kidney failure Sister, Brother    No Known Problems Sister, Brother, Sister, Sister    Thyroid disease Mother          Tobacco Use    Smoking status: Former     Current packs/day: 0.00     Average packs/day: 1 pack/day for 54.2 years (54.2 ttl pk-yrs)     Types: Cigarettes     Start date: 1968     Quit date: 2/28/2022     Years since quitting: 3.3    Smokeless tobacco: Never    Tobacco comments:     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     Patient is currently retired   Substance and Sexual Activity    Alcohol use: No    Drug use: No    Sexual activity: Yes     Partners: Female     Review of Systems  Objective:     Vital Signs (Most Recent):  Temp: 97.4 °F (36.3 °C) (07/21/25 1410)  Pulse: 99 (07/21/25  1420)  Resp: 18 (07/21/25 1420)  BP: (!) 193/93 (07/21/25 1425)  SpO2: 99 % (07/21/25 1410) Vital Signs (24h Range):  Temp:  [97.4 °F (36.3 °C)-98.8 °F (37.1 °C)] 97.4 °F (36.3 °C)  Pulse:  [] 99  Resp:  [18-36] 18  SpO2:  [80 %-99 %] 99 %  BP: (115-195)/() 193/93     Weight: 91.6 kg (202 lb) (07/21/25 0748)  Body mass index is 26.65 kg/m².  Body surface area is 2.17 meters squared.    No intake/output data recorded.     Physical Exam  Vitals and nursing note reviewed.   Constitutional:       Appearance: He is not ill-appearing.   Eyes:      General: No scleral icterus.  Cardiovascular:      Rate and Rhythm: Normal rate.   Pulmonary:      Effort: Pulmonary effort is normal. No respiratory distress.      Breath sounds: Wheezing (minimal diffuse expiratory wheezing) present.   Musculoskeletal:         General: Deformity (LUE AV fistula) present.      Right lower leg: No edema.      Left lower leg: No edema.   Skin:     Capillary Refill: Capillary refill takes less than 2 seconds.   Neurological:      General: No focal deficit present.      Mental Status: He is alert. Mental status is at baseline.   Psychiatric:         Mood and Affect: Mood normal.          Significant Labs:  All labs within the past 24 hours have been reviewed.    Significant Imaging:  Labs: Reviewed  ECG: Reviewed  X-Ray: Reviewed

## 2025-07-21 NOTE — ED PROVIDER NOTES
Encounter Date: 7/21/2025       History     Chief Complaint   Patient presents with    URI     Congestion wheezing, hx cad with 3 stents    Shortness of Breath     HPI    69-year-old male with history of coronary artery disease, hypertension, atrial fibrillation, pericarditis, CKD, sepsis, presenting for shortness of breath.      Patient went to the hospital earlier today to undergo outpatient echocardiogram, when he went home to take a nap this morning, he was awoken out of his sleep with severe shortness of breath.  He had audible wheezing, used his inhaler with moderate improvement in his symptoms.  On arrival to the ER here, he reports his shortness of breath has improved but not back to baseline.  He does not wear oxygen.      He reports 1 week of dry nonproductive cough, moderate shortness of breath on exertion.  He denies orthopnea.  He denies fever, chills, nausea, vomiting, decreased oral intake, diarrhea.    Review of patient's allergies indicates:   Allergen Reactions    Ace inhibitors Swelling    Iodine Itching    Verapamil Other (See Comments)     Other reaction(s): Unknown    Povidone-iodine Itching     Past Medical History:   Diagnosis Date    Atrial fibrillation     CAD (coronary artery disease) 2006    MI in 2006    CHF (congestive heart failure) 2017    CKD (chronic kidney disease) stage 3, GFR 30-59 ml/min     Dr. Latif.  in transplanted kidney    CKD (chronic kidney disease) stage 5, GFR less than 15 ml/min 02/02/2024    COVID-19 02/07/2023    Diverticulosis     Hyperlipidemia     Hypertension     Keloid cicatrix     Metabolic bone disease     Other emphysema 10/01/2019    Pericarditis     S/P kidney transplant 1992    x2 (1992 & 2005),    Thrombocytopenia     Thyroid disease     Tobacco use 09/05/2014     Past Surgical History:   Procedure Laterality Date    AV FISTULA PLACEMENT Left 12/18/2023    Procedure: CREATION, AV FISTULA;  Surgeon: JUANI Melendez III, MD;  Location: [facility] OR [address] [place] [address];  " Service: Vascular;  Laterality: Left;  LUE AVF creation vs AVG insertion    CARDIOVERSION  04/30/15    CHOLECYSTECTOMY      COLONOSCOPY  April 20, 2011    Diverticulosis, repeat recommended in 3 yrs., repeat colonoscopy 2014 revealed 2 polyps.  He should return in 5 years.    COLONOSCOPY N/A 3/13/2020    Procedure: COLONOSCOPY;  Surgeon: Chon Casper MD;  Location: [facility] (4TH FLR);  Service: Endoscopy;  Laterality: N/A;  ok to hold coumadin x5days-see telephone encounter 2/4/20-tb    COLONOSCOPY N/A 2/4/2021    Procedure: COLONOSCOPY;  Surgeon: Chon Casper MD;  Location: [facility] (4TH FLR);  Service: Endoscopy;  Laterality: N/A;  Eliquis - per Dr. GAVIN Blunt ok to hold x 2 days and "restarted asap"- ERW  last prep "poor", [id number] ordered/ Pt requests Dr. Casper  prep ins. mailed - COVID screening on 2/1/21 PCW- ERW    COLONOSCOPY N/A 3/3/2021    Procedure: COLONOSCOPY;  Surgeon: Chon Casper MD;  Location: [facility] (4TH FLR);  Service: Endoscopy;  Laterality: N/A;  Patient with his second poor bowel pre and has poor prep today.  If patient not intersted or can't get colonoscopy tomorrow will need constipation bowel prep and will need to restart his Eliquis today.Thanks,Chon     per Dr Blunt-ok to hold Eliquis 2 days prior      COVID     COLONOSCOPY N/A 12/6/2024    Procedure: COLONOSCOPY;  Surgeon: Chon Casper MD;  Location: [facility] (2ND FLR);  Service: Endoscopy;  Laterality: N/A;  Plavix-Eliquis pending/ HD T-Th-Sat- lab ordered  2/25/24 ECHO PA 49  ref by / [facility] inst portal-RN  11/29-message to clearance nurse for plavix and eliquis hold-tb  ok to hold Eliquis 2 days per Dr Parmar  ok to hold Plavix 5 days per MEENA Jackson PA-GT  12/2- p    CORONARY ANGIOGRAPHY N/A 2/28/2024    Procedure: ANGIOGRAM, CORONARY ARTERY;  Surgeon: Tylor Lincoln MD;  Location: [facility] CATH LAB;  Service: Cardiology;  Laterality: N/A;    CORONARY ANGIOPLASTY WITH STENT PLACEMENT  9/13/2006    " FISTULOGRAM N/A 2/19/2024    Procedure: Fistulogram;  Surgeon: JUANI Melendez III, MD;  Location: [facility] CATH LAB;  Service: Peripheral Vascular;  Laterality: N/A;    FISTULOGRAM, WITH PTA Left 6/3/2024    Procedure: FISTULOGRAM, WITH PTA;  Surgeon: JUANI Melendez III, MD;  Location: [facility] OR [facility] FLR;  Service: Vascular;  Laterality: Left;  mGy: 51.58  GyCm2: 6.8285  Fluoro time: 5.1 min  Contrast: 28 cc    FISTULOGRAM, WITH PTA Left 3/5/2025    Procedure: FISTULOGRAM, WITH PTA;  Surgeon: JUANI Melendez III, MD;  Location: [facility] OR [facility] FLR;  Service: Vascular;  Laterality: Left;    IRRIGATION AND DEBRIDEMENT Right 8/30/2023    Procedure: IRRIGATION AND DEBRIDEMENT, RIGHT MIDDLE FINGER;  Surgeon: Martin Larsen MD;  [facility];  Service: Orthopedics;  Laterality: Right;    KIDNEY TRANSPLANT  2005    LEFT HEART CATHETERIZATION N/A 2/26/2024    Procedure: Left heart cath;  Surgeon: Tylor Lincoln MD;  Location: [facility] CATH LAB;  Service: Cardiology;  Laterality: N/A;    PARATHYROIDECTOMY      PERCUTANEOUS TRANSLUMINAL ANGIOPLASTY OF ARTERIOVENOUS FISTULA Left 2/19/2024    Procedure: PTA, AV FISTULA;  Surgeon: JUANI Melendez III, MD;  Location: [facility] CATH LAB;  Service: Peripheral Vascular;  Laterality: Left;    STENT, DRUG ELUTING, SINGLE VESSEL, CORONARY N/A 2/28/2024    Procedure: Stent, Drug Eluting, Single Vessel, Coronary;  Surgeon: Tylor Lincoln MD;  Location: [facility] CATH LAB;  Service: Cardiology;  Laterality: N/A;    STENT, PERIPHERAL GRAFT Left 3/5/2025    Procedure: STENT, PERIPHERAL GRAFT;  Surgeon: JUANI Melendez III, MD;  Location: [facility] OR [facility] FLR;  Service: Vascular;  Laterality: Left;  mGy 63.17  Gycm2 8.3386  flouro 63.17 min  contrast 38 ml    TREATMENT OF CARDIAC ARRHYTHMIA N/A 3/28/2022    Procedure: CARDIOVERSION;  Surgeon: Migue Blunt MD;  Location: [facility] EP LAB;  Service: Cardiology;  Laterality: N/A;  AF, DCC, MAC, GP, 3 PREP*RODRIGO deferred pt 100% compliant*     VENOPLASTY  6/3/2024    Procedure: ANGIOPLASTY, VEIN;  Surgeon: JUANI Melendez III, MD;  Location: [facility] OR [address] [place] [address];  Service: Vascular;;  Left subclavian     Family History   Problem Relation Name Age of Onset    No Known Problems Sister      No Known Problems Brother      Thyroid disease Mother          s/p surgery    Heart disease Father          had pacemaker    No Known Problems Sister      Kidney failure Sister      Kidney disease Sister      No Known Problems Sister      Kidney disease Brother      Kidney failure Brother          s/p transplant    Diabetes Mellitus Neg Hx      Stroke Neg Hx      Heart attack Neg Hx      Cancer Neg Hx      Celiac disease Neg Hx      Cirrhosis Neg Hx      Colon cancer Neg Hx      Esophageal cancer Neg Hx      Inflammatory bowel disease Neg Hx      Rectal cancer Neg Hx      Stomach cancer Neg Hx      Ulcerative colitis Neg Hx      Liver disease Neg Hx      Liver cancer Neg Hx      Crohn's disease Neg Hx      Melanoma Neg Hx       Social History[1]  Review of Systems  See HPI for pertinent ROS.   Physical Exam     Initial Vitals   BP Pulse Resp Temp SpO2   07/21/25 0748 07/21/25 0710 07/21/25 0748 07/21/25 0748 07/21/25 0748   (!) 146/69 83 20 98.8 °F (37.1 °C) 98 %      MAP       --                Physical Exam    Constitutional: He appears well-developed and well-nourished.   HENT:   Head: Normocephalic and atraumatic.   Eyes: Pupils are equal, round, and reactive to light. No scleral icterus.   Neck: No JVD present.   Normal range of motion.  Cardiovascular:  Normal rate and regular rhythm.     Exam reveals no gallop and no friction rub.       No murmur heard.  Pulmonary/Chest: No stridor. He has wheezes. He has no rhonchi. He has no rales.   Decreased breath sounds bilaterally, decreased air movement globally, soft intermittent wheeze   Abdominal: Abdomen is soft. There is no abdominal tenderness. There is no rebound and no guarding.   Musculoskeletal:         General: No  tenderness (no calf TTP) or edema.      Cervical back: Normal range of motion.     Neurological: He is alert. GCS score is 15. GCS eye subscore is 4. GCS verbal subscore is 5. GCS motor subscore is 6.   Skin: Skin is warm. Capillary refill takes less than 2 seconds.   Psychiatric: He has a normal mood and affect.         ED Course   Procedures  Labs Reviewed   COMPREHENSIVE METABOLIC PANEL - Abnormal       Result Value    Sodium 136      Potassium 4.7      Chloride 101      CO2 24      Glucose 74      BUN 35 (*)     Creatinine 10.1 (*)     Calcium 8.4 (*)     Protein Total 7.1      Albumin 3.1 (*)     Bilirubin Total 0.3      ALP 93      AST 24      ALT 16      Anion Gap 11      eGFR 5 (*)    TROPONIN I HIGH SENSITIVITY - Abnormal    Troponin High Sensitive 65 (*)    TROPONIN I HIGH SENSITIVITY - Abnormal    Troponin High Sensitive 56 (*)    B-TYPE NATRIURETIC PEPTIDE - Abnormal     (*)    CBC WITH DIFFERENTIAL - Abnormal    WBC 8.33      RBC 4.16 (*)     HGB 11.2 (*)     HCT 36.0 (*)     MCV 87      MCH 26.9 (*)     MCHC 31.1 (*)     RDW 17.0 (*)     Platelet Count 157      MPV 12.3      Nucleated RBC 0      Neut % 50.5      Lymph % 26.5      Mono % 11.9      Eos % 10.1 (*)     Basophil % 0.5      Imm Grans % 0.5      Neut # 4.21      Lymph # 2.21      Mono # 0.99      Eos # 0.84 (*)     Baso # 0.04      Imm Grans # 0.04     TROPONIN I HIGH SENSITIVITY - Abnormal    Troponin High Sensitive 64 (*)    PROCALCITONIN - Abnormal    Procalcitonin 0.65 (*)    TROPONIN I HIGH SENSITIVITY - Abnormal    Troponin High Sensitive 54 (*)    INFLUENZA A & B BY MOLECULAR - Normal    INFLUENZA A MOLECULAR Negative      INFLUENZA B MOLECULAR  Negative     SARS-COV-2 RNA AMPLIFICATION, QUAL - Normal    SARS COV-2 Molecular Negative     CBC W/ AUTO DIFFERENTIAL    Narrative:     The following orders were created for panel order CBC auto differential.  Procedure                               Abnormality         Status                      ---------                               -----------         ------                     CBC with Differential[[id number]]       Abnormal            Final result                 Please view results for these tests on the individual orders.        ECG Results              EKG 12-lead (Final result)        Collection Time Result Time QRS Duration [facility] QTC Calculation    07/21/25 20:03:57 07/22/25 08:33:10 110 465                     Final result by Interface, Lab In [facility] (07/22/25 08:33:18)                   Narrative:    Test Reason :    Vent. Rate : 109 BPM     Atrial Rate : 109 BPM     P-R Int : 224 ms          QRS Dur : 110 ms      QT Int : 346 ms       P-R-T Axes :  86  82 258 degrees    QTcB Int : 465 ms    Sinus tachycardia with 1st degree A-V block with Premature supraventricular  complexes  Incomplete left bundle branch block  Marked ST abnormality, possible inferior subendocardial injury  Marked ST abnormality, possible anterolateral subendocardial injury  Abnormal ECG  When compared with ECG of 21-Jul-2025 18:06,  Significant changes have occurred  Confirmed by Danny Mcknight ([id number]) on 7/22/2025 8:33:06 AM    Referred By: AAAREFERRAL SELF           Confirmed By: Danny Mcknight                                     EKG 12-lead (Final result)        Collection Time Result Time QRS Duration OHS QTC Calculation    07/21/25 18:06:52 07/22/25 08:05:43 102 482                     Final result by Interface, Lab In [facility] (07/22/25 08:05:50)                   Narrative:    Test Reason :    Vent. Rate :  99 BPM     Atrial Rate :  99 BPM     P-R Int : 208 ms          QRS Dur : 102 ms      QT Int : 376 ms       P-R-T Axes :  71  62 -40 degrees    QTcB Int : 482 ms    Normal sinus rhythm  Intraventricular conduction delay  ST and T wave abnormality, consider inferolateral ischemia  Prolonged QT  Abnormal ECG  When compared with ECG of 21-Jul-2025 17:43,  No significant change was found  Confirmed by Juan Luis  Danny ([id number]) on 7/22/2025 8:05:39 AM    Referred By: AAAREFERRAL SELF           Confirmed By: Danny Mcknight                                     EKG 12-lead (Final result)        Collection Time Result Time QRS Duration OHS QTC Calculation    07/21/25 17:43:14 07/22/25 07:40:56 104 462                     Final result by Interface, Lab In [facility] (07/22/25 07:41:04)                   Narrative:    Test Reason :    Vent. Rate :  98 BPM     Atrial Rate :  98 BPM     P-R Int : 226 ms          QRS Dur : 104 ms      QT Int : 362 ms       P-R-T Axes :  82  78 263 degrees    QTcB Int : 462 ms    Sinus rhythm with 1st degree A-V block  Nonspecific intraventricular conduction delay  Marked ST abnormality, possible inferolateral subendocardial injury  Abnormal ECG  When compared with ECG of 21-Jul-2025 09:14,  Premature atrial complexes are no longer Present  NV interval has increased  T wave inversion more evident in Inferior leads  Inverted T waves have replaced nonspecific T wave abnormality in Lateral  leads  Confirmed by Danny Mcknight ([id number]) on 7/22/2025 7:40:52 AM    Referred By: AAAREFERRAL SELF           Confirmed By: Danny Mcknight                                     EKG 12-lead (Final result)        Collection Time Result Time QRS Duration OHS QTC Calculation    07/21/25 09:14:41 07/21/25 09:41:19 108 473                     Final result by Interface, Lab In [facility] (07/21/25 09:41:28)                   Narrative:    Test Reason : R06.02,    Vent. Rate :  75 BPM     Atrial Rate :  75 BPM     P-R Int : 184 ms          QRS Dur : 108 ms      QT Int : 424 ms       P-R-T Axes :  86  70  58 degrees    QTcB Int : 473 ms    Sinus rhythm with Premature atrial complexes  Nonspecific ST and T wave abnormality  Prolonged QT  Abnormal ECG  When compared with ECG of 21-Jul-2025 07:53,  No significant change was found  Confirmed by Luke Muñoz ([id number]) on 7/21/2025 9:41:18 AM    Referred By: AAAREFERRAL SELF           Confirmed By:  Luke Muñoz                                     EKG 12-lead (Final result)        Collection Time Result Time QRS Duration OHS QTC Calculation    07/21/25 07:53:09 07/21/25 12:07:42 104 447                     Final result by Interface, Lab In [facility] (07/21/25 12:07:51)                   Narrative:    Test Reason :    Vent. Rate :  71 BPM     Atrial Rate :  71 BPM     P-R Int : 176 ms          QRS Dur : 104 ms      QT Int : 412 ms       P-R-T Axes :  69  48  83 degrees    QTcB Int : 447 ms    Sinus rhythm with Premature supraventricular complexes  Minimal voltage criteria for LVH, may be normal variant ( Sokolow-Maynard )  Nonspecific ST and T wave abnormality  Abnormal ECG  When compared with ECG of 18-Jul-2025 09:50,  The axis Shifted left  ST no longer depressed in Inferior leads  Nonspecific T wave abnormality now evident in Lateral leads  Confirmed by Devan Wasserman ([id number]) on 7/21/2025 12:07:41 PM    Referred By: AAAREFERRAL SELF           Confirmed By: Devan Wasserman                                  Imaging Results              X-Ray Chest 1 View (Final result)  Result time 07/21/25 22:06:40      Final result by Tristin Billings MD (07/21/25 22:06:40)                   Impression:      Mild atelectatic change without additional radiographic evidence for superimposed acute intrathoracic process.      Electronically signed by: Tristin Billings  Date:    07/21/2025  Time:    22:06               Narrative:    EXAMINATION:  XR CHEST 1 VIEW    CLINICAL HISTORY:  SOB;    TECHNIQUE:  Single frontal view of the chest was performed.    COMPARISON:  Chest radiograph July 21, 2025    FINDINGS:  Single chest radiograph is submitted.  Left-sided subclavian vascular stent noted.  Monitoring leads overlie the patient.    The cardiomediastinal silhouette appears stable.  Mild aortic atherosclerotic change noted.    Mild basilar atelectatic change noted.  There is no evidence for confluent infiltrate or consolidation, significant  pleural effusion or pneumothorax.    Suspected remote rib injury on the left noted.  The osseous structures demonstrate chronic change.                                       X-Ray Chest 1 View (Final result)  Result time 07/21/25 08:22:59      Final result by Bryon Nguyen MD (07/21/25 08:22:59)                   Impression:      See above      Electronically signed by: Bryon Nguyen MD  Date:    07/21/2025  Time:    08:22               Narrative:    EXAMINATION:  XR CHEST 1 VIEW    CLINICAL HISTORY:  Shortness of breath    TECHNIQUE:  Single frontal view of the chest was performed.    COMPARISON:  No 07/07/2025 ne    FINDINGS:  Heart size normal.  The lungs are clear.  No pleural effusion                                       Medications   sodium chloride 0.9% flush 3 mL (has no administration in time range)   predniSONE tablet 40 mg (has no administration in time range)   ondansetron disintegrating tablet 8 mg (has no administration in time range)   acetaminophen tablet 650 mg (has no administration in time range)   amiodarone tablet 200 mg (200 mg Oral Given 7/21/25 1420)   atorvastatin tablet 80 mg (80 mg Oral Given 7/21/25 1420)   clopidogreL tablet 75 mg (75 mg Oral Given 7/21/25 1419)   fluticasone furoate-vilanteroL 100-25 mcg/dose diskus inhaler 1 puff (1 puff Inhalation Given 7/21/25 1420)   gabapentin capsule 300 mg (300 mg Oral Given 7/21/25 2137)   midodrine tablet 5 mg (has no administration in time range)   nitroGLYCERIN SL tablet 0.4 mg (0.4 mg Sublingual Given 7/21/25 2003)   sevelamer carbonate tablet 800 mg (800 mg Oral Given 7/21/25 1727)   torsemide tablet 40 mg (40 mg Oral Given 7/21/25 1425)   hydrALAZINE tablet 25 mg (25 mg Oral Given 7/21/25 1419)   albuterol-ipratropium 2.5 mg-0.5 mg/3 mL nebulizer solution 3 mL (3 mLs Nebulization Not Given 7/22/25 0800)   0.9% NaCl infusion (has no administration in time range)   0.9% NaCl infusion (has no administration in time range)   aluminum &  magnesium hydroxide-simethicone 400-400-40 mg/5 mL suspension 30 mL (30 mLs Oral Given 7/21/25 1841)   albuterol-ipratropium 2.5 mg-0.5 mg/3 mL nebulizer solution 3 mL (has no administration in time range)   prochlorperazine injection Soln 5 mg (has no administration in time range)   melatonin tablet 6 mg (6 mg Oral Given 7/22/25 0101)   famotidine tablet 20 mg (has no administration in time range)   heparin 25,000 units in dextrose 5% (100 units/ml) IV bolus from bag LOW INTENSITY nomogram - [facility] (has no administration in time range)   heparin 25,000 units in dextrose 5% 250 mL (100 units/mL) infusion LOW INTENSITY nomogram - [facility] (has no administration in time range)   heparin 25,000 units in dextrose 5% (100 units/ml) IV bolus from bag LOW INTENSITY nomogram - [facility] (has no administration in time range)   heparin 25,000 units in dextrose 5% (100 units/ml) IV bolus from bag LOW INTENSITY nomogram - [facility] (has no administration in time range)   albuterol-ipratropium 2.5 mg-0.5 mg/3 mL nebulizer solution 3 mL (3 mLs Nebulization Given 7/21/25 0819)   albuterol sulfate nebulizer solution 15 mg (15 mg Nebulization Given 7/21/25 1200)   methylPREDNISolone sodium succinate injection 125 mg (125 mg Intravenous Given 7/21/25 1216)     Medical Decision Making  EKG: EKG, normal sinus rhythm with PVC, rate 71, normal axis deviation, no QTC prolongation or ST segment elevation.    See ED course for personal interpretation of workup/ differential.       69-year-old male with history of coronary artery disease presenting for shortness of breath occurring while sleeping.  On arrival, vitals are stable, he is afebrile and saturating well on room air.  Patient recently admitted for pneumonia.  Physical exam with tight breath sounds bilaterally.  He was given triple DuoNeb therapy, with improvement of respiratory effort.  Labs overall reassuring, consistent with ESRD on hemodialysis.  On walking pulse ox challenge, patient desaturated to  80% with repeat wheezing.  ED would like to admit for COPD exacerbation, Solu-Medrol and repeat albuterol neb ordered.  Discussed patient with Hospital Medicine who accepts patient for admission.       Amount and/or Complexity of Data Reviewed  Independent Historian: spouse  Labs: ordered. Decision-making details documented in ED Course.  Radiology: ordered. Decision-making details documented in ED Course.  ECG/medicine tests:  Decision-making details documented in ED Course.    Risk  Prescription drug management.  Decision regarding hospitalization.               ED Course as of 07/22/25 1109   Mon Jul 21, 2025   0829 X-Ray Chest 1 View  Chest x-ray independently interpreted by me shows no acute process such as pneumonia, pneumothorax, or pulmonary edema.    [BD]   0829 EKG 12-lead  Independently interpreted shows normal sinus rhythm, rate 71, LVH, no STEMI, overall appears stable compared to 07/18/2025 [BD]   1232 CBC auto differential(!)  No evidence of leukocytosis, acute anemia requiring transfusion or thrombocytopenia.   [KB]   1232 Comprehensive metabolic panel(!)  Consistent with ESRD [KB]   1232 B-Type natriuretic peptide (BNP)(!)  Similar to previous, CHF exacerbation less likely [KB]   1232 Troponin I High Sensitivity #2(!)  Likely secondary to decreased clearance, we will delta, [KB]   1233 X-Ray Chest 1 View  On my personal interpretation of this CXR, there is no evidence of acute pneumonia, pneumothorax, pleural effusion, or pulmonary edema.    [KB]   1417    [BD]      ED Course User Index  [BD] Indra Lopez MD  [KB] Hannah Harden MD                            Clinical Impression:  Final diagnoses:  [R06.02] SOB (shortness of breath)  [R07.9] Chest pain  [R06.02] Shortness of breath  [J44.1] COPD exacerbation (Primary)  [R09.02] Hypoxia          ED Disposition Condition    Observation                     Hannah Harden MD  Resident  07/21/25 1233         [1]   Social History  Tobacco Use     Smoking status: Former     Current packs/day: 0.00     Average packs/day: 1 pack/day for 54.2 years (54.2 ttl pk-yrs)     Types: Cigarettes     Start date: 1968     Quit date: 2/28/2022     Years since quitting: 3.3    Smokeless tobacco: Never    Tobacco comments:     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     Patient is currently retired   Substance Use Topics    Alcohol use: No    Drug use: No        Indra Lopez MD  07/22/25 1103

## 2025-07-21 NOTE — PLAN OF CARE
[facility] - Emergency Dept  Initial Discharge Assessment       Primary Care Provider: Anatoliy Colindres MD    Admission Diagnosis: SOB (shortness of breath) [R06.02]    Admission Date: 7/21/2025    Pt is independent with their ADL's and ambulation, does not require assistance. Post acute was discussed with pt. Pt d/c home with no needs at the moment. Pt spouse Shea [phone], who's at bedside to provide transportation home when medically ready.     PT is on dialysis:  Andrea Liriano  [facility]  11:15 AM.     Expected Discharge Date:     Transition of Care Barriers: (P) None    Payor: [employer] MEDICARE / Plan: [employer] HMO PPO SPECIAL NEEDS / Product Type: Medicare Advantage /     Extended Emergency Contact Information  Primary Emergency Contact: Pat Phelpsnne  Address: [address] [place], [place] [id number] [place]  Mobile Phone: [phone]  Relation: Spouse  Preferred language: English  Secondary Emergency Contact: Mary Becerra  Address: [address] [place] [address]  Home Phone: [phone]  Mobile Phone: [phone]  Relation: Daughter    Discharge Plan A: (P) Home, Home with family  Discharge Plan B: (P) Home with family, Home      [id number] - [place], [place] - [address]  [address]  [facility] [address]  Phone: [phone] Fax: [phone]    [facility]  [address]  [facility] [address]  Phone: [phone] Fax: [phone]    [id number] - [place], [place] - [address] AT [facility] [address]  [address]  [facility] [address]  Phone: [phone] Fax: [phone]      Initial Assessment (most recent)       Adult Discharge Assessment - 07/21/25 1332          Discharge Assessment    Assessment Type Discharge Planning Assessment (P)      Confirmed/corrected address, phone number and insurance Yes (P)      Confirmed  Demographics Correct on Facesheet (P)      Source of Information patient (P)      People in Home spouse (P)      Do you expect to return to your current living situation? Yes (P)      Do you have help at home or someone to help you manage your care at home? No (P)      Prior to hospitilization cognitive status: Alert/Oriented (P)      Current cognitive status: Alert/Oriented (P)      Walking or Climbing Stairs Difficulty no (P)      Dressing/Bathing Difficulty no (P)      Home Accessibility stairs to enter home (P)      Number of Stairs, Main Entrance one (P)      Home Layout Able to live on 1st floor (P)      Equipment Currently Used at Home none (P)      Readmission within 30 days? No (P)      Patient currently being followed by outpatient case management? No (P)      Do you currently have service(s) that help you manage your care at home? No (P)      Do you take prescription medications? Yes (P)      Do you have prescription coverage? Yes (P)      Do you have any problems affording any of your prescribed medications? No (P)      Who is going to help you get home at discharge? spouse (P)      How do you get to doctors appointments? car, drives self (P)      Are you on dialysis? Yes (P)      Dialysis Name and Scheduled days [facility], [facility], 11:15 AM (P)      Do you take coumadin? No (P)      Discharge Plan A Home;Home with family (P)      Discharge Plan B Home with family;Home (P)      DME Needed Upon Discharge  none (P)      Discharge Plan discussed with: Patient (P)      Transition of Care Barriers None (P)         Physical Activity    On average, how many days per week do you engage in moderate to strenuous exercise (like a brisk walk)? 0 days (P)      On average, how many minutes do you engage in exercise at this level? 0 min (P)         Financial Resource Strain    How hard is it for you to pay for the very basics like food, housing, medical care, and heating? Not very hard (P)         Housing  Stability    In the last 12 months, was there a time when you were not able to pay the mortgage or rent on time? No (P)      At any time in the past 12 months, were you homeless or living in a shelter (including now)? No (P)         Transportation Needs    In the past 12 months, has lack of transportation kept you from medical appointments or from getting medications? No (P)      In the past 12 months, has lack of transportation kept you from meetings, work, or from getting things needed for daily living? No (P)         Food Insecurity    Within the past 12 months, you worried that your food would run out before you got the money to buy more. Never true (P)      Within the past 12 months, the food you bought just didn't last and you didn't have money to get more. Never true (P)         Stress    Do you feel stress - tense, restless, nervous, or anxious, or unable to sleep at night because your mind is troubled all the time - these days? Only a little (P)         Social Isolation    How often do you feel lonely or isolated from those around you?  Never (P)         Alcohol Use    Q1: How often do you have a drink containing alcohol? Never (P)      Q2: How many drinks containing alcohol do you have on a typical day when you are drinking? Patient does not drink (P)      Q3: How often do you have six or more drinks on one occasion? Never (P)         Utilities    In the past 12 months has the electric, gas, oil, or water company threatened to shut off services in your home? No (P)         Health Literacy    How often do you need to have someone help you when you read instructions, pamphlets, or other written material from your doctor or pharmacy? Never (P)                       ZACKARY Aguilar, ULICESW.   Case Management  [facility]  Email: [email or web address]

## 2025-07-21 NOTE — Clinical Note
70 ml of contrast were injected throughout the case. 130 mL of contrast was the total wasted during the case. 200 mL was the total amount used during the case.

## 2025-07-21 NOTE — HPI
Mr. Ibrahima Phelps is a 69-year-old male with history of coronary artery disease, MI, hypertension, atrial fibrillation, pericarditis, ESRD, presenting for shortness of breath.  Patient went to the hospital earlier today to undergo outpatient echocardiogram, when he went home to take a nap this morning, he was awoken out of his sleep with severe shortness of breath. He had audible wheezing, used his inhaler with moderate improvement in his symptoms. Reports his inhalers are nearly out.  He does not wear oxygen. He reports 1 week of dry nonproductive cough, moderate shortness of breath on exertion. Also associated burning chest pain. He denies orthopnea. On arrival to the ER here, he reports his shortness of breath has improved but not back to baseline.   He denies fever, chills, nausea, vomiting, decreased oral intake, diarrhea.His last HD session was on 7/19.   In ED, Pt hypertensive 191/125, RR 22, O2 sat 97% on RA. No leukocytosis. No significant electrolyte abnormalities. , trop 56>64. CXR: Heart size normal.  The lungs are clear.  No pleural effusion. ECG w/ NSR, no acute ischemia. Given duonebs and Iv solumedrol. Nephrology consulted for ESRD management.

## 2025-07-21 NOTE — HPI
Mr. Ibrahima Phelps is a 69-year-old male with history of coronary artery disease, MI, hypertension, atrial fibrillation, pericarditis, ESRD, presenting for shortness of breath.  Patient went to the hospital earlier today to undergo outpatient echocardiogram, when he went home to take a nap this morning, he was awoken out of his sleep with severe shortness of breath. He had audible wheezing, used his inhaler with moderate improvement in his symptoms. Reports his inhalers are nearly out.  He does not wear oxygen. He reports 1 week of dry nonproductive cough, moderate shortness of breath on exertion. Also associated burning chest pain. He denies orthopnea. On arrival to the ER here, he reports his shortness of breath has improved but not back to baseline.   He denies fever, chills, nausea, vomiting, decreased oral intake, diarrhea.    In ED, Pt hypertensive 191/125, RR 22, O2 sat 97% on RA. No leukocytosis. No significant electrolyte abnormalities. , trop 56>64. CXR: Heart size normal.  The lungs are clear.  No pleural effusion. ECG w/ NSR, no acute ischemia. Given duonebs and Iv solumedrol.

## 2025-07-21 NOTE — SUBJECTIVE & OBJECTIVE
"Past Medical History:   Diagnosis Date    Atrial fibrillation     CAD (coronary artery disease) 2006    MI in 2006    CHF (congestive heart failure) 2017    CKD (chronic kidney disease) stage 3, GFR 30-59 ml/min     Dr. Latif.  in transplanted kidney    CKD (chronic kidney disease) stage 5, GFR less than 15 ml/min 02/02/2024    COVID-19 02/07/2023    Diverticulosis     Hyperlipidemia     Hypertension     Keloid cicatrix     Metabolic bone disease     Other emphysema 10/01/2019    Pericarditis     S/P kidney transplant 1992    x2 (1992 & 2005),    Thrombocytopenia     Thyroid disease     Tobacco use 09/05/2014       Past Surgical History:   Procedure Laterality Date    AV FISTULA PLACEMENT Left 12/18/2023    Procedure: CREATION, AV FISTULA;  Surgeon: JUANI Melendez III, MD;  [facility];  Service: Vascular;  Laterality: Left;  LUE AVF creation vs AVG insertion    CARDIOVERSION  04/30/15    CHOLECYSTECTOMY      COLONOSCOPY  April 20, 2011    Diverticulosis, repeat recommended in 3 yrs., repeat colonoscopy 2014 revealed 2 polyps.  He should return in 5 years.    COLONOSCOPY N/A 3/13/2020    Procedure: COLONOSCOPY;  Surgeon: Chon Casper MD;  Location: [facility] (4TH FLR);  Service: Endoscopy;  Laterality: N/A;  ok to hold coumadin x5days-see telephone encounter 2/4/20-tb    COLONOSCOPY N/A 2/4/2021    Procedure: COLONOSCOPY;  Surgeon: Chon Casper MD;  Location: [facility] (4TH FLR);  Service: Endoscopy;  Laterality: N/A;  Eliquis - per Dr. GAVIN Blunt ok to hold x 2 days and "restarted asap"- ERW  last prep "poor", [id number] ordered/ Pt requests Dr. Casper  prep ins. mailed - COVID screening on 2/1/21 PCW- ERW    COLONOSCOPY N/A 3/3/2021    Procedure: COLONOSCOPY;  Surgeon: Chon Casper MD;  Location: [facility] (4TH FLR);  Service: Endoscopy;  Laterality: N/A;  Patient with his second poor bowel pre and has poor prep today.  If patient not intersted or can't get colonoscopy tomorrow will " need constipation bowel prep and will need to restart his Eliquis today.Thanks,Chon     per Dr Blunt-ok to hold Eliquis 2 days prior      COVID     COLONOSCOPY N/A 12/6/2024    Procedure: COLONOSCOPY;  Surgeon: Chon Casper MD;  Location: [facility] (2ND FLR);  Service: Endoscopy;  Laterality: N/A;  Plavix-Eliquis pending/ HD T-Th-Sat- lab ordered  2/25/24 ECHO PA 49  ref by / [facility] portal-RN  11/29-message to clearance nurse for plavix and eliquis hold-tb  ok to hold Eliquis 2 days per Dr Parmar  ok to hold Plavix 5 days per MEENA COLVINGT  12/2- p    CORONARY ANGIOGRAPHY N/A 2/28/2024    Procedure: ANGIOGRAM, CORONARY ARTERY;  Surgeon: Tylor Lincoln MD;  Location: [facility] CATH LAB;  Service: Cardiology;  Laterality: N/A;    CORONARY ANGIOPLASTY WITH STENT PLACEMENT  9/13/2006    FISTULOGRAM N/A 2/19/2024    Procedure: Fistulogram;  Surgeon: JUANI Melendez III, MD;  Location: [facility] CATH LAB;  Service: Peripheral Vascular;  Laterality: N/A;    FISTULOGRAM, WITH PTA Left 6/3/2024    Procedure: FISTULOGRAM, WITH PTA;  Surgeon: UJANI Melendez III, MD;  [facility];  Service: Vascular;  Laterality: Left;  mGy: 51.58  GyCm2: 6.8285  Fluoro time: 5.1 min  Contrast: 28 cc    FISTULOGRAM, WITH PTA Left 3/5/2025    Procedure: FISTULOGRAM, WITH PTA;  Surgeon: JUANI Melendez III, MD;  [facility];  Service: Vascular;  Laterality: Left;    IRRIGATION AND DEBRIDEMENT Right 8/30/2023    Procedure: IRRIGATION AND DEBRIDEMENT, RIGHT MIDDLE FINGER;  Surgeon: Martin Larsen MD;  [facility];  Service: Orthopedics;  Laterality: Right;    KIDNEY TRANSPLANT  2005    LEFT HEART CATHETERIZATION N/A 2/26/2024    Procedure: Left heart cath;  Surgeon: Tylor Lincoln MD;  Location: [facility] CATH LAB;  Service: Cardiology;  Laterality: N/A;    PARATHYROIDECTOMY      PERCUTANEOUS TRANSLUMINAL ANGIOPLASTY OF ARTERIOVENOUS FISTULA Left 2/19/2024    Procedure: PTA,  AV FISTULA;  Surgeon: JUANI Melendez III, MD;  Location: [facility] CATH LAB;  Service: Peripheral Vascular;  Laterality: Left;    STENT, DRUG ELUTING, SINGLE VESSEL, CORONARY N/A 2/28/2024    Procedure: Stent, Drug Eluting, Single Vessel, Coronary;  Surgeon: Tylor Lincoln MD;  Location: [facility] CATH LAB;  Service: Cardiology;  Laterality: N/A;    STENT, PERIPHERAL GRAFT Left 3/5/2025    Procedure: STENT, PERIPHERAL GRAFT;  Surgeon: JUANI Melendez III, MD;  [facility];  Service: Vascular;  Laterality: Left;  mGy 63.17  Gycm2 8.3386  flouro 63.17 min  contrast 38 ml    TREATMENT OF CARDIAC ARRHYTHMIA N/A 3/28/2022    Procedure: CARDIOVERSION;  Surgeon: Migue Blunt MD;  Location: [facility] EP LAB;  Service: Cardiology;  Laterality: N/A;  AF, DCC, MAC, GP, 3 PREP*RODRIGO deferred pt 100% compliant*    VENOPLASTY  6/3/2024    Procedure: ANGIOPLASTY, VEIN;  Surgeon: JUANI Melendez III, MD;  [facility];  Service: Vascular;;  Left subclavian       Review of patient's allergies indicates:   Allergen Reactions    Ace inhibitors Swelling    Iodine Itching    Verapamil Other (See Comments)     Other reaction(s): Unknown    Povidone-iodine Itching       No current facility-administered medications on file prior to encounter.     Current Outpatient Medications on File Prior to Encounter   Medication Sig    acetaminophen (TYLENOL) 500 MG tablet Take 1 tablet (500 mg total) by mouth every 6 (six) hours as needed for Pain.    albuterol (VENTOLIN HFA) 90 mcg/actuation inhaler Inhale 2 puffs into the lungs every 6 (six) hours as needed for Wheezing or Shortness of Breath. Rescue    albuterol-ipratropium (DUO-NEB) 2.5 mg-0.5 mg/3 mL nebulizer solution Take 3 mLs by nebulization every 4 (four) hours as needed for Wheezing or Shortness of Breath. Rescue    amiodarone (PACERONE) 200 MG Tab TAKE 1 TABLET BY MOUTH EVERY DAY    atorvastatin (LIPITOR) 80 MG tablet Take 1 tablet (80 mg total) by mouth once daily.     b complex vitamins (B COMPLEX-VITAMIN B12) tablet Take 1 tablet by mouth once daily.    clopidogreL (PLAVIX) 75 mg tablet Take 1 tablet (75 mg total) by mouth once daily.    ELIQUIS 5 mg Tab TAKE 1 TABLET BY MOUTH TWICE A DAY    famotidine (PEPCID) 20 MG tablet Take 1 tablet (20 mg total) by mouth once daily. On dialysis days, please take this medicine after dialysis. Only taking on Tue, Thur, and  Sat.    fexofenadine HCl (ALLEGRA ORAL) Take 1 tablet by mouth daily as needed (allergies).    fluticasone furoate-vilanteroL (BREO) 100-25 mcg/dose diskus inhaler Inhale 1 puff into the lungs once daily. Controller. Use this inhaler every day.    fluticasone propionate (FLONASE) 50 mcg/actuation nasal spray 1 spray (50 mcg total) by Each Nostril route daily as needed for Allergies.    gabapentin (NEURONTIN) 300 MG capsule Take 1 capsule (300 mg total) by mouth every evening.    ipratropium (ATROVENT) 21 mcg (0.03 %) nasal spray 2 sprays by Each Nostril route 3 (three) times daily.    LIDOcaine (LIDODERM) 5 % Place 1 patch onto the skin once daily. Remove & Discard patch within 12 hours or as directed by MD    midodrine (PROAMATINE) 5 MG Tab Take 1 tablet (5 mg total) by mouth 3 (three) times daily as needed (if blood pressure less than 100/60 mmHg).    multivit-min-FA-lycopen-lutein (CENTRUM SILVER) 0.4 mg-300 mcg- 250 mcg Tab Take 1 tablet by mouth once daily.    nitroGLYCERIN (NITROSTAT) 0.4 MG SL tablet Place 1 tablet (0.4 mg total) under the tongue every 5 (five) minutes as needed for Chest pain.    omeprazole (PRILOSEC) 20 MG capsule Take 1 capsule (20 mg total) by mouth daily as needed (heartburn).    sevelamer carbonate (RENVELA) 800 mg Tab This medicine is used to treat your phosphorus level. Please take 1 tablet (800 mg) once a day with the largest meal of the day.    torsemide (DEMADEX) 20 MG Tab Take 2 tablets (40 mg total) by mouth 3 (three) times a week.    [DISCONTINUED] pulse oximeter (PULSE OXIMETER)  device by Apply Externally route 2 (two) times a day. Use twice daily at 8 AM and 3 PM and record the value in MyChart as directed.     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     Patient is currently retired   Substance and Sexual Activity    Alcohol use: No    Drug use: No    Sexual activity: Yes     Partners: Female     Review of Systems   Constitutional:  Positive for activity change. Negative for chills, diaphoresis, fatigue and fever.   HENT:  Negative for congestion.    Respiratory:  Positive for cough and shortness of breath. Negative for chest tightness and wheezing.    Cardiovascular:  Positive for chest pain. Negative for palpitations and leg swelling.   Gastrointestinal:  Negative for abdominal pain, blood in stool, constipation, diarrhea, nausea and vomiting.   Genitourinary:         Anuric   Musculoskeletal:  Negative for arthralgias, back pain and neck stiffness.   Neurological:  Negative for dizziness, tremors, seizures, syncope, weakness, light-headedness, numbness and headaches.   Psychiatric/Behavioral:  Negative for agitation and confusion.      Objective:     Vital Signs (Most Recent):  Temp: 97.4 °F (36.3 °C) (07/21/25 1410)  Pulse: 104 (07/21/25 1410)  Resp: 18 (07/21/25 1410)  BP: (!) 193/93 (07/21/25 1410)  SpO2: 99 % (07/21/25 1410) Vital Signs (24h Range):  Temp:  [97.4 °F (36.3 °C)-98.8 °F (37.1 °C)] 97.4 °F (36.3 °C)  Pulse:  []  104  Resp:  [18-36] 18  SpO2:  [80 %-99 %] 99 %  BP: (115-195)/() 193/93     Weight: 91.6 kg (202 lb)  Body mass index is 26.65 kg/m².     Physical Exam  Vitals and nursing note reviewed.   Constitutional:       General: He is not in acute distress.     Appearance: He is well-developed. He is not diaphoretic.   HENT:      Head: Normocephalic and atraumatic.      Right Ear: External ear normal.      Left Ear: External ear normal.      Nose: Nose normal. No congestion.      Mouth/Throat:      Pharynx: Oropharynx is clear.   Eyes:      General: No scleral icterus.     Extraocular Movements: Extraocular movements intact.   Cardiovascular:      Rate and Rhythm: Normal rate and regular rhythm.      Pulses: Normal pulses.      Heart sounds: Normal heart sounds. No murmur heard.  Pulmonary:      Effort: Pulmonary effort is normal. No respiratory distress.      Breath sounds: Normal breath sounds. No wheezing or rales.   Abdominal:      General: Bowel sounds are normal. There is no distension.      Palpations: Abdomen is soft.      Tenderness: There is no abdominal tenderness. There is no guarding or rebound.   Musculoskeletal:      Cervical back: Normal range of motion.      Right lower leg: No edema.      Left lower leg: No edema.   Skin:     General: Skin is warm and dry.      Capillary Refill: Capillary refill takes less than 2 seconds.   Neurological:      General: No focal deficit present.      Mental Status: He is alert and oriented to person, place, and time. Mental status is at baseline.   Psychiatric:         Mood and Affect: Mood normal.         Behavior: Behavior normal.         Thought Content: Thought content normal.                Significant Labs: All pertinent labs within the past 24 hours have been reviewed.  ABGs:   Recent Labs   Lab 07/21/25 0917   PH 7.378   PCO2 41.1   HCO3 23.3   PO2 48.9     CBC:   Recent Labs   Lab 07/21/25  0904 07/21/25 0917   WBC 8.33  --    HGB 11.2*  --    HCT 36.0* 34.2      --      CMP:   Recent Labs   Lab 07/21/25  0856      K 4.7      CO2 24   GLU 74   BUN 35*   CREATININE 10.1*   CALCIUM 8.4*   PROT 7.1   ALBUMIN 3.1*   BILITOT 0.3   ALKPHOS 93   AST 24   ALT 16   ANIONGAP 11     Cardiac Markers:   Recent Labs   Lab 07/21/25  0856   *     Troponin:   Recent Labs   Lab 07/21/25  0856 07/21/25  1149   TROPONINIHS 56*  65* 64*       Significant Imaging: I have reviewed all pertinent imaging results/findings within the past 24 hours.  Imaging Results              X-Ray Chest 1 View (Final result)  Result time 07/21/25 08:22:59      Final result by Bryon Nguyen MD (07/21/25 08:22:59)                   Impression:      See above      Electronically signed by: Bryon Nguyen MD  Date:    07/21/2025  Time:    08:22               Narrative:    EXAMINATION:  XR CHEST 1 VIEW    CLINICAL HISTORY:  Shortness of breath    TECHNIQUE:  Single frontal view of the chest was performed.    COMPARISON:  No 07/07/2025 ne    FINDINGS:  Heart size normal.  The lungs are clear.  No pleural effusion

## 2025-07-21 NOTE — ED NOTES
Assumed patient care. Patient requesting oxygen. Patient 99% on RA. MARISA Barriga at bedside states okay to place patient on oxygen for comfort.

## 2025-07-21 NOTE — HPI
69 y.o. male with CAD s/p PCI 2006, PCI to RCA w/ 2 DIEGO 2/28/2024, paroxysmal Afib (on amiodarone and Eliquis), HFpEF, PAD, S/P kidney transplant on immunosuppressive therapy with Tacrolimus and prednisone, smoking presented to emergency room for development of shortness of breath today morning.  Patient was at the hospital today morning for his echocardiogram after good returning to the home he developed a shortness of breath associated with the chest pain substernal 4/10 nonradiating.  Patient reports his chest pain has been resolved after giving nitroglycerin into ED. he denies shortness of breath currently.  He is following Dr. Vail cardiologist, last clinic visit on 7/10/25.  He is compliant to all his medicines that includes Eliquis 5 mg bid, plavix 75 mg daily, and atorvastatin 80 mg daily. He is no longer taking metoprolol succinate 25 mg daily or nifedipine 30 mg daily due to low blood pressures.    TTE 7/21/25    Left Ventricle: The left ventricle is normal in size. Ventricular mass is normal. Normal wall thickness. Normal wall motion. There is normal systolic function with a visually estimated ejection fraction of 55 - 60%. There is indeterminate diastolic function.    Right Ventricle: The right ventricle is normal in size measuring 2.9 cmWall thickness is normal. . Systolic function is normal.    Aortic Valve: There is moderate aortic valve sclerosis. Mildly restricted motion. There is moderate stenosis. Aortic valve area by VTI is 1.1 cm2. Aortic valve peak velocity is 2.5 m/s. Mean gradient is 14 mmHg. The dimensionless index is 0.32. There is mild aortic regurgitation.    Tricuspid Valve: There is mild regurgitation.    Pulmonary Artery: There is pulmonary hypertension. The estimated pulmonary artery systolic pressure is 44 mmHg.    IVC/SVC: Normal venous pressure at 3 mmHg.

## 2025-07-21 NOTE — CONSULTS
[facility] - Emergency Dept  Nephrology  Consult Note    Patient Name: Ibrahima Phelps  MRN: [id number]  Admission Date: 7/21/2025  Hospital Length of Stay: 0 days  Attending Provider: Griselda Phelps MD   Primary Care Physician: Anatoliy Colindres MD  Principal Problem:Chronic obstructive pulmonary disease, unspecified    Inpatient consult to Nephrology  Consult performed by: Ruba Quinn MD  Consult ordered by: Leela Barriga, CORI  Reason for consult: ESRD management        Subjective:     HPI: Mr. Ibrahima Phelps is a 69-year-old male with history of coronary artery disease, MI, hypertension, atrial fibrillation, pericarditis, ESRD, presenting for shortness of breath.  Patient went to the hospital earlier today to undergo outpatient echocardiogram, when he went home to take a nap this morning, he was awoken out of his sleep with severe shortness of breath. He had audible wheezing, used his inhaler with moderate improvement in his symptoms. Reports his inhalers are nearly out.  He does not wear oxygen. He reports 1 week of dry nonproductive cough, moderate shortness of breath on exertion. Also associated burning chest pain. He denies orthopnea. On arrival to the ER here, he reports his shortness of breath has improved but not back to baseline.   He denies fever, chills, nausea, vomiting, decreased oral intake, diarrhea.His last HD session was on 7/19.   In ED, Pt hypertensive 191/125, RR 22, O2 sat 97% on RA. No leukocytosis. No significant electrolyte abnormalities. , trop 56>64. CXR: Heart size normal.  The lungs are clear.  No pleural effusion. ECG w/ NSR, no acute ischemia. Given duonebs and Iv solumedrol. Nephrology consulted for ESRD management.     Past Medical History:   Diagnosis Date    Atrial fibrillation     CAD (coronary artery disease) 2006    MI in 2006    CHF (congestive heart failure) 2017    CKD (chronic kidney disease) stage 3, GFR 30-59 ml/min     Dr. Latif.  in transplanted kidney  "   CKD (chronic kidney disease) stage 5, GFR less than 15 ml/min 02/02/2024    COVID-19 02/07/2023    Diverticulosis     Hyperlipidemia     Hypertension     Keloid cicatrix     Metabolic bone disease     Other emphysema 10/01/2019    Pericarditis     S/P kidney transplant 1992    x2 (1992 & 2005),    Thrombocytopenia     Thyroid disease     Tobacco use 09/05/2014       Past Surgical History:   Procedure Laterality Date    AV FISTULA PLACEMENT Left 12/18/2023    Procedure: CREATION, AV FISTULA;  Surgeon: JUANI Melendez III, MD;  Location: [facility] OR 2ND FLR;  Service: Vascular;  Laterality: Left;  LUE AVF creation vs AVG insertion    CARDIOVERSION  04/30/15    CHOLECYSTECTOMY      COLONOSCOPY  April 20, 2011    Diverticulosis, repeat recommended in 3 yrs., repeat colonoscopy 2014 revealed 2 polyps.  He should return in 5 years.    COLONOSCOPY N/A 3/13/2020    Procedure: COLONOSCOPY;  Surgeon: Chon Casper MD;  Location: [facility] (4TH FLR);  Service: Endoscopy;  Laterality: N/A;  ok to hold coumadin x5days-see telephone encounter 2/4/20-tb    COLONOSCOPY N/A 2/4/2021    Procedure: COLONOSCOPY;  Surgeon: Chon Casper MD;  Location: [facility] (4TH FLR);  Service: Endoscopy;  Laterality: N/A;  Eliquis - per Dr. GAVIN Blunt ok to hold x 2 days and "restarted asap"- ERW  last prep "poor", [id number] ordered/ Pt requests Dr. Casper  prep ins. mailed - COVID screening on 2/1/21 PCW- ERW    COLONOSCOPY N/A 3/3/2021    Procedure: COLONOSCOPY;  Surgeon: Chon Casper MD;  Location: [facility] (4TH FLR);  Service: Endoscopy;  Laterality: N/A;  Patient with his second poor bowel pre and has poor prep today.  If patient not intersted or can't get colonoscopy tomorrow will need constipation bowel prep and will need to restart his Eliquis today.Thanks,Chon Blunt-ok to hold Eliquis 2 days prior      COVID     COLONOSCOPY N/A 12/6/2024    Procedure: COLONOSCOPY;  Surgeon: Chon Casper MD;  Location: [facility] " (2ND FLR);  Service: Endoscopy;  Laterality: N/A;  Plavix-Eliquis pending/ HD T-Th-Sat- lab ordered  2/25/24 ECHO PA 49  ref by / [facility] damir chan-RN  11/29-message to clearance nurse for plavix and eliquis hold-tb  ok to hold Eliquis 2 days per Dr Parmar  ok to hold Plavix 5 days per MEENA Jackson PA-GT  12/2- p    CORONARY ANGIOGRAPHY N/A 2/28/2024    Procedure: ANGIOGRAM, CORONARY ARTERY;  Surgeon: Tylor Lincoln MD;  Location: [facility] CATH LAB;  Service: Cardiology;  Laterality: N/A;    CORONARY ANGIOPLASTY WITH STENT PLACEMENT  9/13/2006    FISTULOGRAM N/A 2/19/2024    Procedure: Fistulogram;  Surgeon: JUANI Melendez III, MD;  Location: [facility] CATH LAB;  Service: Peripheral Vascular;  Laterality: N/A;    FISTULOGRAM, WITH PTA Left 6/3/2024    Procedure: FISTULOGRAM, WITH PTA;  Surgeon: JUANI Melendez III, MD;  [facility];  Service: Vascular;  Laterality: Left;  mGy: 51.58  GyCm2: 6.8285  Fluoro time: 5.1 min  Contrast: 28 cc    FISTULOGRAM, WITH PTA Left 3/5/2025    Procedure: FISTULOGRAM, WITH PTA;  Surgeon: JUANI Melendez III, MD;  [facility];  Service: Vascular;  Laterality: Left;    IRRIGATION AND DEBRIDEMENT Right 8/30/2023    Procedure: IRRIGATION AND DEBRIDEMENT, RIGHT MIDDLE FINGER;  Surgeon: Martin Larsen MD;  [facility];  Service: Orthopedics;  Laterality: Right;    KIDNEY TRANSPLANT  2005    LEFT HEART CATHETERIZATION N/A 2/26/2024    Procedure: Left heart cath;  Surgeon: Tylor Lincoln MD;  Location: [facility] CATH LAB;  Service: Cardiology;  Laterality: N/A;    PARATHYROIDECTOMY      PERCUTANEOUS TRANSLUMINAL ANGIOPLASTY OF ARTERIOVENOUS FISTULA Left 2/19/2024    Procedure: PTA, AV FISTULA;  Surgeon: JUANI Melendez III, MD;  Location: [facility] CATH LAB;  Service: Peripheral Vascular;  Laterality: Left;    STENT, DRUG ELUTING, SINGLE VESSEL, CORONARY N/A 2/28/2024    Procedure: Stent, Drug Eluting, Single Vessel, Coronary;  Surgeon:  Tylor Lincoln MD;  Location: [facility] CATH LAB;  Service: Cardiology;  Laterality: N/A;    STENT, PERIPHERAL GRAFT Left 3/5/2025    Procedure: STENT, PERIPHERAL GRAFT;  Surgeon: JUANI Melendez III, MD;  Location: [facility] OR [address] [place] [address];  Service: Vascular;  Laterality: Left;  mGy 63.17  Gycm2 8.3386  flouro 63.17 min  contrast 38 ml    TREATMENT OF CARDIAC ARRHYTHMIA N/A 3/28/2022    Procedure: CARDIOVERSION;  Surgeon: Migue Blunt MD;  Location: [facility] EP LAB;  Service: Cardiology;  Laterality: N/A;  AF, DCC, MAC, GP, 3 PREP*RODRIGO deferred pt 100% compliant*    VENOPLASTY  6/3/2024    Procedure: ANGIOPLASTY, VEIN;  Surgeon: JUANI Melendez III, MD;  Location: [facility] OR [address] [place] [address];  Service: Vascular;;  Left subclavian       Review of patient's allergies indicates:   Allergen Reactions    Ace inhibitors Swelling    Iodine Itching    Verapamil Other (See Comments)     Other reaction(s): Unknown    Povidone-iodine Itching     Current Facility-Administered Medications   Medication Frequency    acetaminophen tablet 650 mg Q8H PRN    albuterol-ipratropium 2.5 mg-0.5 mg/3 mL nebulizer solution 3 mL Q4H WAKE    amiodarone tablet 200 mg Daily    apixaban tablet 5 mg BID    atorvastatin tablet 80 mg Daily    clopidogreL tablet 75 mg Daily    famotidine tablet 20 mg Daily    fluticasone furoate-vilanteroL 100-25 mcg/dose diskus inhaler 1 puff Daily    gabapentin capsule 300 mg QHS    hydrALAZINE tablet 25 mg Q8H PRN    midodrine tablet 5 mg TID PRN    nitroGLYCERIN SL tablet 0.4 mg Q5 Min PRN    ondansetron disintegrating tablet 8 mg Q8H PRN    [START ON 7/22/2025] predniSONE tablet 40 mg Daily    sevelamer carbonate tablet 800 mg Daily with dinner    sodium chloride 0.9% flush 3 mL PRN    torsemide tablet 40 mg Once per day on Monday Wednesday Friday     Current Outpatient Medications   Medication    acetaminophen (TYLENOL) 500 MG tablet    albuterol (VENTOLIN HFA) 90 mcg/actuation inhaler    albuterol-ipratropium (DUO-NEB) 2.5  mg-0.5 mg/3 mL nebulizer solution    amiodarone (PACERONE) 200 MG Tab    atorvastatin (LIPITOR) 80 MG tablet    b complex vitamins (B COMPLEX-VITAMIN B12) tablet    clopidogreL (PLAVIX) 75 mg tablet    ELIQUIS 5 mg Tab    famotidine (PEPCID) 20 MG tablet    fexofenadine HCl (ALLEGRA ORAL)    fluticasone furoate-vilanteroL (BREO) 100-25 mcg/dose diskus inhaler    fluticasone propionate (FLONASE) 50 mcg/actuation nasal spray    gabapentin (NEURONTIN) 300 MG capsule    ipratropium (ATROVENT) 21 mcg (0.03 %) nasal spray    LIDOcaine (LIDODERM) 5 %    midodrine (PROAMATINE) 5 MG Tab    multivit-min-FA-lycopen-lutein (CENTRUM SILVER) 0.4 mg-300 mcg- 250 mcg Tab    nitroGLYCERIN (NITROSTAT) 0.4 MG SL tablet    omeprazole (PRILOSEC) 20 MG capsule    sevelamer carbonate (RENVELA) 800 mg Tab    torsemide (DEMADEX) 20 MG Tab     Family History       Problem Relation (Age of Onset)    Heart disease Father    Kidney disease Sister, Brother    Kidney failure Sister, Brother    No Known Problems Sister, Brother, Sister, Sister    Thyroid disease Mother          Tobacco Use    Smoking status: Former     Current packs/day: 0.00     Average packs/day: 1 pack/day for 54.2 years (54.2 ttl pk-yrs)     Types: Cigarettes     Start date: 1968     Quit date: 2/28/2022     Years since quitting: 3.3    Smokeless tobacco: Never    Tobacco comments:     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     Patient is currently retired   Substance and Sexual Activity    Alcohol use: No    Drug use: No    Sexual activity: Yes     Partners: Female     Review of Systems  Objective:     Vital Signs (Most Recent):  Temp: 97.4 °F (36.3 °C) (07/21/25 1410)  Pulse: 99 (07/21/25 1420)  Resp: 18 (07/21/25 1420)  BP: (!) 193/93 (07/21/25 1425)  SpO2: 99 % (07/21/25 1410) Vital Signs (24h Range):  Temp:  [97.4 °F (36.3 °C)-98.8 °F (37.1 °C)] 97.4 °F (36.3 °C)  Pulse:  [] 99  Resp:  [18-36] 18  SpO2:  [80 %-99 %] 99 %  BP:  (115-195)/() 193/93     Weight: 91.6 kg (202 lb) (07/21/25 0748)  Body mass index is 26.65 kg/m².  Body surface area is 2.17 meters squared.    No intake/output data recorded.     Physical Exam  Vitals and nursing note reviewed.   Constitutional:       Appearance: He is not ill-appearing.   Eyes:      General: No scleral icterus.  Cardiovascular:      Rate and Rhythm: Normal rate.   Pulmonary:      Effort: Pulmonary effort is normal. No respiratory distress.      Breath sounds: Wheezing (minimal diffuse expiratory wheezing) present.   Musculoskeletal:         General: Deformity (LUE AV fistula) present.      Right lower leg: No edema.      Left lower leg: No edema.   Skin:     Capillary Refill: Capillary refill takes less than 2 seconds.   Neurological:      General: No focal deficit present.      Mental Status: He is alert. Mental status is at baseline.   Psychiatric:         Mood and Affect: Mood normal.          Significant Labs:  All labs within the past 24 hours have been reviewed.    Significant Imaging:  Labs: Reviewed  ECG: Reviewed  X-Ray: Reviewed  Assessment/Plan:     Pulmonary  * Chronic obstructive pulmonary disease, unspecified  -Management per primary team    Renal/  ESRD (end stage renal disease) on dialysis  Nephrology History  -Outpatient HD unit: [facility]  -Nephrologist: Dr Gambino  -HD tx days: TTS  -Last HD tx: 7/19  -HD access: LUE AV fistula  -HD modality: iHD  -Residual urine: +  -EDW:  90 kg     -No emergent need for renal replacement therapy at this time  -iHD per TTS while admitted unless emergent need arises  -Strict I&O, pre and post-dialysis weight  -Renal diet as tolerated  -Renal profile on dialysis days     H/O kidney transplant  Patient is 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 No longer on immunosuppressants.    Oncology  Anemia in chronic kidney disease  Hb at goal.     -Defer ISAURA to outpatient  unit  -Trend Hb for goal 9-11 g/dl    Endocrine  Secondary renal hyperparathyroidism  -Continue home sevelamer  -Trend Ca/phos        Thank you for your consult. I will follow-up with patient. Please contact us if you have any additional questions.    Case discussed with Dr Bautista. Full attending attestation to follow.     Ruba Quinn MD  Nephrology PGY-4  [facility] - Emergency Dept

## 2025-07-21 NOTE — ASSESSMENT & PLAN NOTE
- trop 56>64, trending  - ECG w/ NSR, nonischemic  - continue tele  - prn SL NTG  Results for orders placed during the hospital encounter of 07/21/25    Echo    Interpretation Summary    Left Ventricle: The left ventricle is normal in size. Ventricular mass is normal. Normal wall thickness. Normal wall motion. There is normal systolic function with a visually estimated ejection fraction of 55 - 60%. There is indeterminate diastolic function.    Right Ventricle: The right ventricle is normal in size measuring 2.9 cmWall thickness is normal. . Systolic function is normal.    Aortic Valve: There is moderate aortic valve sclerosis. Mildly restricted motion. There is moderate stenosis. Aortic valve area by VTI is 1.1 cm2. Aortic valve peak velocity is 2.5 m/s. Mean gradient is 14 mmHg. The dimensionless index is 0.32. There is mild aortic regurgitation.    Tricuspid Valve: There is mild regurgitation.    Pulmonary Artery: There is pulmonary hypertension. The estimated pulmonary artery systolic pressure is 44 mmHg.    IVC/SVC: Normal venous pressure at 3 mmHg.

## 2025-07-21 NOTE — ASSESSMENT & PLAN NOTE
Patient has paroxysmal (<7 days) atrial fibrillation. Patient is currently in sinus rhythm. [id number] Score: 2. The patients heart rate in the last 24 hours is as follows:  Pulse  Min: 60  Max: 106     Antiarrhythmics  amiodarone tablet 200 mg, Daily, Oral    Anticoagulants  apixaban tablet 5 mg, 2 times daily, Oral    Plan  - Replete lytes with a goal of K>4, Mg >2  - Patient is anticoagulated, see medications listed above.  - Patient's afib is currently controlled

## 2025-07-21 NOTE — Clinical Note
dry, intact, no bleeding and no hematoma. [No Acute Distress] : no acute distress [Well-Appearing] : well-appearing [Normal] : affect was normal and insight and judgment were intact [de-identified] : + 2 LE edema R leg, 2+ radial pulses b/l

## 2025-07-21 NOTE — ASSESSMENT & PLAN NOTE
Nephrology History  -Outpatient HD unit: [facility]  -Nephrologist: Dr Gambino  -HD tx days: TTS  -Last HD tx: 7/19  -HD access: LUE AV fistula  -HD modality: iHD  -Residual urine: +  -EDW:  90 kg     -No emergent need for renal replacement therapy at this time  -iHD per TTS while admitted unless emergent need arises  -Strict I&O, pre and post-dialysis weight  -Renal diet as tolerated  -Renal profile on dialysis days

## 2025-07-21 NOTE — ED NOTES
Ibrahima Phelps, a 69 y.o. male presents to the ED w/ complaint of SOB and audible wheezing at home. Pt reports receiving his most recent dialysis. Denies pain.     Triage note:  Chief Complaint   Patient presents with    URI     Congestion wheezing, hx cad with 3 stents    Shortness of Breath     Review of patient's allergies indicates:   Allergen Reactions    Ace inhibitors Swelling    Iodine Itching    Verapamil Other (See Comments)     Other reaction(s): Unknown    Povidone-iodine Itching     Past Medical History:   Diagnosis Date    Atrial fibrillation     CAD (coronary artery disease) 2006    MI in 2006    CHF (congestive heart failure) 2017    CKD (chronic kidney disease) stage 3, GFR 30-59 ml/min     Dr. Latif.  in transplanted kidney    CKD (chronic kidney disease) stage 5, GFR less than 15 ml/min 02/02/2024    COVID-19 02/07/2023    Diverticulosis     Hyperlipidemia     Hypertension     Keloid cicatrix     Metabolic bone disease     Other emphysema 10/01/2019    Pericarditis     S/P kidney transplant 1992    x2 (1992 & 2005),    Thrombocytopenia     Thyroid disease     Tobacco use 09/05/2014

## 2025-07-21 NOTE — ASSESSMENT & PLAN NOTE
Patient with known CAD s/p stent placement, which is controlled Will continue ASA, Plavix, and Statin and monitor for S/Sx of angina/ACS. Continue to monitor on telemetry.     - trop 56>64, trending  - ECG w/ NSR, nonischemic  - continue tele  - prn SL NTG  Results for orders placed during the hospital encounter of 07/21/25    Echo    Interpretation Summary    Left Ventricle: The left ventricle is normal in size. Ventricular mass is normal. Normal wall thickness. Normal wall motion. There is normal systolic function with a visually estimated ejection fraction of 55 - 60%. There is indeterminate diastolic function.    Right Ventricle: The right ventricle is normal in size measuring 2.9 cmWall thickness is normal. . Systolic function is normal.    Aortic Valve: There is moderate aortic valve sclerosis. Mildly restricted motion. There is moderate stenosis. Aortic valve area by VTI is 1.1 cm2. Aortic valve peak velocity is 2.5 m/s. Mean gradient is 14 mmHg. The dimensionless index is 0.32. There is mild aortic regurgitation.    Tricuspid Valve: There is mild regurgitation.    Pulmonary Artery: There is pulmonary hypertension. The estimated pulmonary artery systolic pressure is 44 mmHg.    IVC/SVC: Normal venous pressure at 3 mmHg.

## 2025-07-21 NOTE — H&P
[facility] - Emergency Dept  [facility] Medicine  History & Physical    Patient Name: Ibrahima Phelps  MRN: [id number]  Patient Class: OP- Observation  Admission Date: 7/21/2025  Attending Physician: Griselda Phelps MD   Primary Care Provider: Anatoliy Colindres MD         Patient information was obtained from patient, past medical records, and ER records.     Subjective:     Principal Problem:Chronic obstructive pulmonary disease, unspecified    Chief Complaint:   Chief Complaint   Patient presents with    URI     Congestion wheezing, hx cad with 3 stents    Shortness of Breath        HPI: Mr. Ibrahima Phelps is a 69-year-old male with history of coronary artery disease, MI, hypertension, atrial fibrillation, pericarditis, ESRD, presenting for shortness of breath.  Patient went to the hospital earlier today to undergo outpatient echocardiogram, when he went home to take a nap this morning, he was awoken out of his sleep with severe shortness of breath. He had audible wheezing, used his inhaler with moderate improvement in his symptoms. Reports his inhalers are nearly out.  He does not wear oxygen. He reports 1 week of dry nonproductive cough, moderate shortness of breath on exertion. Also associated burning chest pain. He denies orthopnea. On arrival to the ER here, he reports his shortness of breath has improved but not back to baseline.   He denies fever, chills, nausea, vomiting, decreased oral intake, diarrhea.    In ED, Pt hypertensive 191/125, RR 22, O2 sat 97% on RA. No leukocytosis. No significant electrolyte abnormalities. , trop 56>64. CXR: Heart size normal.  The lungs are clear.  No pleural effusion. ECG w/ NSR, no acute ischemia. Given duonebs and Iv solumedrol.     Past Medical History:   Diagnosis Date    Atrial fibrillation     CAD (coronary artery disease) 2006    MI in 2006    CHF (congestive heart failure) 2017    CKD (chronic kidney disease) stage 3, GFR 30-59 ml/min     Dr. Latif.  in  "transplanted kidney    CKD (chronic kidney disease) stage 5, GFR less than 15 ml/min 02/02/2024    COVID-19 02/07/2023    Diverticulosis     Hyperlipidemia     Hypertension     Keloid cicatrix     Metabolic bone disease     Other emphysema 10/01/2019    Pericarditis     S/P kidney transplant 1992    x2 (1992 & 2005),    Thrombocytopenia     Thyroid disease     Tobacco use 09/05/2014       Past Surgical History:   Procedure Laterality Date    AV FISTULA PLACEMENT Left 12/18/2023    Procedure: CREATION, AV FISTULA;  Surgeon: JUANI Melendez III, MD;  Location: [facility] OR 2ND FLR;  Service: Vascular;  Laterality: Left;  LUE AVF creation vs AVG insertion    CARDIOVERSION  04/30/15    CHOLECYSTECTOMY      COLONOSCOPY  April 20, 2011    Diverticulosis, repeat recommended in 3 yrs., repeat colonoscopy 2014 revealed 2 polyps.  He should return in 5 years.    COLONOSCOPY N/A 3/13/2020    Procedure: COLONOSCOPY;  Surgeon: Chon Casper MD;  Location: [facility] (4TH FLR);  Service: Endoscopy;  Laterality: N/A;  ok to hold coumadin x5days-see telephone encounter 2/4/20-tb    COLONOSCOPY N/A 2/4/2021    Procedure: COLONOSCOPY;  Surgeon: Chon Casper MD;  Location: [facility] (4TH FLR);  Service: Endoscopy;  Laterality: N/A;  Eliquis - per Dr. GAVIN chua to hold x 2 days and "restarted asap"- ERW  last prep "poor", [id number] ordered/ Pt requests Dr. Casper  prep ins. mailed - COVID screening on 2/1/21 PCW- ERW    COLONOSCOPY N/A 3/3/2021    Procedure: COLONOSCOPY;  Surgeon: Chon Casper MD;  Location: [facility] (4TH FLR);  Service: Endoscopy;  Laterality: N/A;  Patient with his second poor bowel pre and has poor prep today.  If patient not intersted or can't get colonoscopy tomorrow will need constipation bowel prep and will need to restart his Eliquis today.Thanks,Chon Blunt-ok to hold Eliquis 2 days prior      COVID     COLONOSCOPY N/A 12/6/2024    Procedure: COLONOSCOPY;  Surgeon: Chon Casper MD;  " Location: [facility] ENDO (2ND FLR);  Service: Endoscopy;  Laterality: N/A;  Plavix-Eliquis pending/ HD T-Th-Sat- lab ordered  2/25/24 ECHO PA 49  ref by / suprep inst portal-RN  11/29-message to clearance nurse for plavix and eliquis hold-tb  ok to hold Eliquis 2 days per Dr Parmar  ok to hold Plavix 5 days per MEENA CUNNINGHAM-GT  12/2- p    CORONARY ANGIOGRAPHY N/A 2/28/2024    Procedure: ANGIOGRAM, CORONARY ARTERY;  Surgeon: Tylor Lincoln MD;  Location: [facility] CATH LAB;  Service: Cardiology;  Laterality: N/A;    CORONARY ANGIOPLASTY WITH STENT PLACEMENT  9/13/2006    FISTULOGRAM N/A 2/19/2024    Procedure: Fistulogram;  Surgeon: JUANI Melendez III, MD;  Location: [facility] CATH LAB;  Service: Peripheral Vascular;  Laterality: N/A;    FISTULOGRAM, WITH PTA Left 6/3/2024    Procedure: FISTULOGRAM, WITH PTA;  Surgeon: JUANI Melendez III, MD;  [facility];  Service: Vascular;  Laterality: Left;  mGy: 51.58  GyCm2: 6.8285  Fluoro time: 5.1 min  Contrast: 28 cc    FISTULOGRAM, WITH PTA Left 3/5/2025    Procedure: FISTULOGRAM, WITH PTA;  Surgeon: JUANI Melendez III, MD;  [facility];  Service: Vascular;  Laterality: Left;    IRRIGATION AND DEBRIDEMENT Right 8/30/2023    Procedure: IRRIGATION AND DEBRIDEMENT, RIGHT MIDDLE FINGER;  Surgeon: Martin Larsen MD;  [facility];  Service: Orthopedics;  Laterality: Right;    KIDNEY TRANSPLANT  2005    LEFT HEART CATHETERIZATION N/A 2/26/2024    Procedure: Left heart cath;  Surgeon: Tylor Lincoln MD;  Location: [facility] CATH LAB;  Service: Cardiology;  Laterality: N/A;    PARATHYROIDECTOMY      PERCUTANEOUS TRANSLUMINAL ANGIOPLASTY OF ARTERIOVENOUS FISTULA Left 2/19/2024    Procedure: PTA, AV FISTULA;  Surgeon: JUANI Melendez III, MD;  Location: [facility] CATH LAB;  Service: Peripheral Vascular;  Laterality: Left;    STENT, DRUG ELUTING, SINGLE VESSEL, CORONARY N/A 2/28/2024    Procedure: Stent, Drug Eluting, Single Vessel,  Coronary;  Surgeon: Tylor Lincoln MD;  Location: [facility] CATH LAB;  Service: Cardiology;  Laterality: N/A;    STENT, PERIPHERAL GRAFT Left 3/5/2025    Procedure: STENT, PERIPHERAL GRAFT;  Surgeon: JUANI Melendez III, MD;  [facility];  Service: Vascular;  Laterality: Left;  mGy 63.17  Gycm2 8.3386  flouro 63.17 min  contrast 38 ml    TREATMENT OF CARDIAC ARRHYTHMIA N/A 3/28/2022    Procedure: CARDIOVERSION;  Surgeon: Migue Blunt MD;  Location: [facility] EP LAB;  Service: Cardiology;  Laterality: N/A;  AF, DCC, MAC, GP, 3 PREP*RODRIGO deferred pt 100% compliant*    VENOPLASTY  6/3/2024    Procedure: ANGIOPLASTY, VEIN;  Surgeon: JUANI Melendez III, MD;  Location: [facility] OR [address] [place] [address];  Service: Vascular;;  Left subclavian       Review of patient's allergies indicates:   Allergen Reactions    Ace inhibitors Swelling    Iodine Itching    Verapamil Other (See Comments)     Other reaction(s): Unknown    Povidone-iodine Itching       No current facility-administered medications on file prior to encounter.     Current Outpatient Medications on File Prior to Encounter   Medication Sig    acetaminophen (TYLENOL) 500 MG tablet Take 1 tablet (500 mg total) by mouth every 6 (six) hours as needed for Pain.    albuterol (VENTOLIN HFA) 90 mcg/actuation inhaler Inhale 2 puffs into the lungs every 6 (six) hours as needed for Wheezing or Shortness of Breath. Rescue    albuterol-ipratropium (DUO-NEB) 2.5 mg-0.5 mg/3 mL nebulizer solution Take 3 mLs by nebulization every 4 (four) hours as needed for Wheezing or Shortness of Breath. Rescue    amiodarone (PACERONE) 200 MG Tab TAKE 1 TABLET BY MOUTH EVERY DAY    atorvastatin (LIPITOR) 80 MG tablet Take 1 tablet (80 mg total) by mouth once daily.    b complex vitamins (B COMPLEX-VITAMIN B12) tablet Take 1 tablet by mouth once daily.    clopidogreL (PLAVIX) 75 mg tablet Take 1 tablet (75 mg total) by mouth once daily.    ELIQUIS 5 mg Tab TAKE 1 TABLET BY MOUTH TWICE A DAY     famotidine (PEPCID) 20 MG tablet Take 1 tablet (20 mg total) by mouth once daily. On dialysis days, please take this medicine after dialysis. Only taking on Tue, Thur, and  Sat.    fexofenadine HCl (ALLEGRA ORAL) Take 1 tablet by mouth daily as needed (allergies).    fluticasone furoate-vilanteroL (BREO) 100-25 mcg/dose diskus inhaler Inhale 1 puff into the lungs once daily. Controller. Use this inhaler every day.    fluticasone propionate (FLONASE) 50 mcg/actuation nasal spray 1 spray (50 mcg total) by Each Nostril route daily as needed for Allergies.    gabapentin (NEURONTIN) 300 MG capsule Take 1 capsule (300 mg total) by mouth every evening.    ipratropium (ATROVENT) 21 mcg (0.03 %) nasal spray 2 sprays by Each Nostril route 3 (three) times daily.    LIDOcaine (LIDODERM) 5 % Place 1 patch onto the skin once daily. Remove & Discard patch within 12 hours or as directed by MD    midodrine (PROAMATINE) 5 MG Tab Take 1 tablet (5 mg total) by mouth 3 (three) times daily as needed (if blood pressure less than 100/60 mmHg).    multivit-min-FA-lycopen-lutein (CENTRUM SILVER) 0.4 mg-300 mcg- 250 mcg Tab Take 1 tablet by mouth once daily.    nitroGLYCERIN (NITROSTAT) 0.4 MG SL tablet Place 1 tablet (0.4 mg total) under the tongue every 5 (five) minutes as needed for Chest pain.    omeprazole (PRILOSEC) 20 MG capsule Take 1 capsule (20 mg total) by mouth daily as needed (heartburn).    sevelamer carbonate (RENVELA) 800 mg Tab This medicine is used to treat your phosphorus level. Please take 1 tablet (800 mg) once a day with the largest meal of the day.    torsemide (DEMADEX) 20 MG Tab Take 2 tablets (40 mg total) by mouth 3 (three) times a week.    [DISCONTINUED] pulse oximeter (PULSE OXIMETER) device by Apply Externally route 2 (two) times a day. Use twice daily at 8 AM and 3 PM and record the value in [facility] as directed.     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     Patient is currently retired   Substance and Sexual Activity    Alcohol use: No    Drug use: No    Sexual activity: Yes     Partners: Female     Review of Systems   Constitutional:  Positive for activity change. Negative for chills, diaphoresis, fatigue and fever.   HENT:  Negative for congestion.    Respiratory:  Positive for cough and shortness of breath. Negative for chest tightness and wheezing.    Cardiovascular:  Positive for chest pain. Negative for palpitations and leg swelling.   Gastrointestinal:  Negative for abdominal pain, blood in stool, constipation, diarrhea, nausea and vomiting.   Genitourinary:         Anuric   Musculoskeletal:  Negative for arthralgias, back pain and neck stiffness.   Neurological:  Negative for dizziness, tremors, seizures, syncope, weakness, light-headedness, numbness and headaches.   Psychiatric/Behavioral:  Negative for agitation and confusion.      Objective:     Vital Signs (Most Recent):  Temp: 97.4 °F (36.3 °C) (07/21/25 1410)  Pulse: 104 (07/21/25 1410)  Resp: 18 (07/21/25 1410)  BP: (!) 193/93 (07/21/25 1410)  SpO2: 99 % (07/21/25 1410) Vital Signs (24h Range):  Temp:  [97.4 °F (36.3 °C)-98.8 °F (37.1 °C)] 97.4 °F (36.3 °C)  Pulse:  [] 104  Resp:  [18-36] 18  SpO2:  [80 %-99 %] 99 %  BP: (115-195)/() 193/93     Weight: 91.6 kg (202 lb)  Body mass index is 26.65 kg/m².     Physical Exam  Vitals and nursing note reviewed.   Constitutional:       General: He is not  in acute distress.     Appearance: He is well-developed. He is not diaphoretic.   HENT:      Head: Normocephalic and atraumatic.      Right Ear: External ear normal.      Left Ear: External ear normal.      Nose: Nose normal. No congestion.      Mouth/Throat:      Pharynx: Oropharynx is clear.   Eyes:      General: No scleral icterus.     Extraocular Movements: Extraocular movements intact.   Cardiovascular:      Rate and Rhythm: Normal rate and regular rhythm.      Pulses: Normal pulses.      Heart sounds: Normal heart sounds. No murmur heard.  Pulmonary:      Effort: Pulmonary effort is normal. No respiratory distress.      Breath sounds: Normal breath sounds. No wheezing or rales.   Abdominal:      General: Bowel sounds are normal. There is no distension.      Palpations: Abdomen is soft.      Tenderness: There is no abdominal tenderness. There is no guarding or rebound.   Musculoskeletal:      Cervical back: Normal range of motion.      Right lower leg: No edema.      Left lower leg: No edema.   Skin:     General: Skin is warm and dry.      Capillary Refill: Capillary refill takes less than 2 seconds.   Neurological:      General: No focal deficit present.      Mental Status: He is alert and oriented to person, place, and time. Mental status is at baseline.   Psychiatric:         Mood and Affect: Mood normal.         Behavior: Behavior normal.         Thought Content: Thought content normal.                Significant Labs: All pertinent labs within the past 24 hours have been reviewed.  ABGs:   Recent Labs   Lab 07/21/25  0917   PH 7.378   PCO2 41.1   HCO3 23.3   PO2 48.9     CBC:   Recent Labs   Lab 07/21/25  0904 07/21/25  0917   WBC 8.33  --    HGB 11.2*  --    HCT 36.0* 34.2     --      CMP:   Recent Labs   Lab 07/21/25  0856      K 4.7      CO2 24   GLU 74   BUN 35*   CREATININE 10.1*   CALCIUM 8.4*   PROT 7.1   ALBUMIN 3.1*   BILITOT 0.3   ALKPHOS 93   AST 24   ALT 16   ANIONGAP 11      Cardiac Markers:   Recent Labs   Lab 07/21/25  0856   *     Troponin:   Recent Labs   Lab 07/21/25  0856 07/21/25  1149   TROPONINIHS 56*  65* 64*       Significant Imaging: I have reviewed all pertinent imaging results/findings within the past 24 hours.  Imaging Results              X-Ray Chest 1 View (Final result)  Result time 07/21/25 08:22:59      Final result by Bryon Nguyen MD (07/21/25 08:22:59)                   Impression:      See above      Electronically signed by: Bryon Nguyen MD  Date:    07/21/2025  Time:    08:22               Narrative:    EXAMINATION:  XR CHEST 1 VIEW    CLINICAL HISTORY:  Shortness of breath    TECHNIQUE:  Single frontal view of the chest was performed.    COMPARISON:  No 07/07/2025 ne    FINDINGS:  Heart size normal.  The lungs are clear.  No pleural effusion                                     Assessment/Plan:     Assessment & Plan  Chronic obstructive pulmonary disease, unspecified  Other emphysema  Patient's COPD is with exacerbation noted by continued dyspnea currently.  Patient is currently on COPD Pathway. Continue scheduled inhalers Steroids and Supplemental oxygen and monitor respiratory status closely.     - O2 sat 97% on RA  - No leukocytosis  - CXR: Heart size normal.  The lungs are clear.  No pleural effusion.   - ECG w/ NSR, no acute ischemia.   - s/p duonebs and IV solumedrol  - continue prednisone 40 mg qd  - duonebs q4h   - O2 NC prn  - incentive spirometry  - continue monitoring  Chest pain in patient with CAD (coronary artery disease)  Elevated troponin  History of non-ST elevation myocardial infarction (NSTEMI)  Patient with known CAD s/p stent placement, which is controlled Will continue ASA, Plavix, and Statin and monitor for S/Sx of angina/ACS. Continue to monitor on telemetry.     - trop 56>64, trending  - ECG w/ NSR, nonischemic  - continue tele  - prn SL NTG  Results for orders placed during the hospital encounter of  07/21/25    Echo    Interpretation Summary    Left Ventricle: The left ventricle is normal in size. Ventricular mass is normal. Normal wall thickness. Normal wall motion. There is normal systolic function with a visually estimated ejection fraction of 55 - 60%. There is indeterminate diastolic function.    Right Ventricle: The right ventricle is normal in size measuring 2.9 cmWall thickness is normal. . Systolic function is normal.    Aortic Valve: There is moderate aortic valve sclerosis. Mildly restricted motion. There is moderate stenosis. Aortic valve area by VTI is 1.1 cm2. Aortic valve peak velocity is 2.5 m/s. Mean gradient is 14 mmHg. The dimensionless index is 0.32. There is mild aortic regurgitation.    Tricuspid Valve: There is mild regurgitation.    Pulmonary Artery: There is pulmonary hypertension. The estimated pulmonary artery systolic pressure is 44 mmHg.    IVC/SVC: Normal venous pressure at 3 mmHg.   Mixed hyperlipidemia  - continue statin  Paroxysmal atrial fibrillation  Patient has paroxysmal (<7 days) atrial fibrillation. Patient is currently in sinus rhythm. [id number] Score: 2. The patients heart rate in the last 24 hours is as follows:  Pulse  Min: 60  Max: 106     Antiarrhythmics  amiodarone tablet 200 mg, Daily, Oral    Anticoagulants  apixaban tablet 5 mg, 2 times daily, Oral    Plan  - Replete lytes with a goal of K>4, Mg >2  - Patient is anticoagulated, see medications listed above.  - Patient's afib is currently controlled  Former smoker  Dangers of cigarette smoking were reviewed with patient in detail for 3 minutes and patient was encouraged to continue cessation. Nicotine replacement options were discussed, patient no longer smokes.  ESRD (end stage renal disease) on dialysis  H/O kidney transplant  Creatine stable for now. BMP reviewed- noted Estimated Creatinine Clearance: 7.7 mL/min (A) (based on SCr of 10.1 mg/dL (H)). according to latest data. Based on current GFR, CKD stage is  end stage.  Monitor UOP and serial BMP and adjust therapy as needed. Renally dose meds. Avoid nephrotoxic medications and procedures.    - HD TTS, last dialyzed 7/19  - Nephrology consulted  GERD (gastroesophageal reflux disease)  - continue famotidine  Anemia in chronic kidney disease  Anemia is likely due to chronic disease due to ESRD. Most recent hemoglobin and hematocrit are listed below.  Recent Labs     07/21/25  0904 07/21/25  0917   HGB 11.2*  --    HCT 36.0* 34.2     Plan  - Monitor serial CBC: Daily  - Transfuse PRBC if patient becomes hemodynamically unstable, symptomatic or H/H drops below 7/21.  - Patient has not received any PRBC transfusions to date  - Patient's anemia is currently stable    VTE Risk Mitigation (From admission, onward)           Ordered     apixaban tablet 5 mg  2 times daily         07/21/25 1322     IP VTE HIGH RISK PATIENT  Once         07/21/25 1225     Place sequential compression device  Until discontinued         07/21/25 1225                    SDOH Screening:  The patient was screened for utility difficulties, food insecurity, transport difficulties, housing insecurity, and interpersonal safety and there were no concerns identified this admission.                   On 07/21/2025, patient should be placed in hospital observation services under my care in collaboration with Dr. Phelps.           Leela Barriga PA-C  Department of Hospital Medicine  [facility] - Emergency Dept

## 2025-07-21 NOTE — ASSESSMENT & PLAN NOTE
Patient is 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 No longer on immunosuppressants.

## 2025-07-21 NOTE — ASSESSMENT & PLAN NOTE
Creatine stable for now. BMP reviewed- noted Estimated Creatinine Clearance: 7.7 mL/min (A) (based on SCr of 10.1 mg/dL (H)). according to latest data. Based on current GFR, CKD stage is end stage.  Monitor UOP and serial BMP and adjust therapy as needed. Renally dose meds. Avoid nephrotoxic medications and procedures.    - HD TTS, last dialyzed 7/19  - Nephrology consulted

## 2025-07-21 NOTE — ASSESSMENT & PLAN NOTE
Dangers of cigarette smoking were reviewed with patient in detail for 3 minutes and patient was encouraged to continue cessation. Nicotine replacement options were discussed, patient no longer smokes.

## 2025-07-22 PROBLEM — T86.10 KIDNEY TRANSPLANT AS CAUSE OF ABNORMAL REACTION OR LATER COMPLICATION: Status: ACTIVE | Noted: 2025-07-22

## 2025-07-22 LAB
ABSOLUTE EOSINOPHIL (OHS): 0.02 K/UL
ABSOLUTE MONOCYTE (OHS): 0.09 K/UL (ref 0.3–1)
ABSOLUTE NEUTROPHIL COUNT (OHS): 6.68 K/UL (ref 1.8–7.7)
ANION GAP (OHS): 15 MMOL/L (ref 8–16)
APTT PPP: 30 SECONDS (ref 21–32)
BASOPHILS # BLD AUTO: 0.01 K/UL
BASOPHILS NFR BLD AUTO: 0.1 %
BUN SERPL-MCNC: 51 MG/DL (ref 8–23)
CALCIUM SERPL-MCNC: 8.2 MG/DL (ref 8.7–10.5)
CHLORIDE SERPL-SCNC: 98 MMOL/L (ref 95–110)
CO2 SERPL-SCNC: 19 MMOL/L (ref 23–29)
CREAT SERPL-MCNC: 12.2 MG/DL (ref 0.5–1.4)
ERYTHROCYTE [DISTWIDTH] IN BLOOD BY AUTOMATED COUNT: 17 % (ref 11.5–14.5)
GFR SERPLBLD CREATININE-BSD FMLA CKD-EPI: 4 ML/MIN/1.73/M2
GLUCOSE SERPL-MCNC: 112 MG/DL (ref 70–110)
HBV SURFACE AG SERPL QL IA: NORMAL
HCT VFR BLD AUTO: 32.5 % (ref 40–54)
HGB BLD-MCNC: 10.3 GM/DL (ref 14–18)
HOLD SPECIMEN: NORMAL
IMM GRANULOCYTES # BLD AUTO: 0.04 K/UL (ref 0–0.04)
IMM GRANULOCYTES NFR BLD AUTO: 0.5 % (ref 0–0.5)
INR PPP: 1.1 (ref 0.8–1.2)
LYMPHOCYTES # BLD AUTO: 0.61 K/UL (ref 1–4.8)
MAGNESIUM SERPL-MCNC: 2.2 MG/DL (ref 1.6–2.6)
MCH RBC QN AUTO: 26.9 PG (ref 27–31)
MCHC RBC AUTO-ENTMCNC: 31.7 G/DL (ref 32–36)
MCV RBC AUTO: 85 FL (ref 82–98)
NUCLEATED RBC (/100WBC) (OHS): 0 /100 WBC
OHS CV CPX PATIENT HEIGHT IN: 73
OHS QRS DURATION: 102 MS
OHS QRS DURATION: 104 MS
OHS QRS DURATION: 110 MS
OHS QTC CALCULATION: 462 MS
OHS QTC CALCULATION: 465 MS
OHS QTC CALCULATION: 482 MS
PHOSPHATE SERPL-MCNC: 4.9 MG/DL (ref 2.7–4.5)
PLATELET # BLD AUTO: 144 K/UL (ref 150–450)
PMV BLD AUTO: 11.5 FL (ref 9.2–12.9)
POCT GLUCOSE: 143 MG/DL (ref 70–110)
POTASSIUM SERPL-SCNC: 5.7 MMOL/L (ref 3.5–5.1)
PROTHROMBIN TIME: 12.4 SECONDS (ref 9–12.5)
RBC # BLD AUTO: 3.83 M/UL (ref 4.6–6.2)
RELATIVE EOSINOPHIL (OHS): 0.3 %
RELATIVE LYMPHOCYTE (OHS): 8.2 % (ref 18–48)
RELATIVE MONOCYTE (OHS): 1.2 % (ref 4–15)
RELATIVE NEUTROPHIL (OHS): 89.7 % (ref 38–73)
SODIUM SERPL-SCNC: 132 MMOL/L (ref 136–145)
WBC # BLD AUTO: 7.45 K/UL (ref 3.9–12.7)

## 2025-07-22 PROCEDURE — 93454 CORONARY ARTERY ANGIO S&I: CPT | Mod: 26,HCNC,, | Performed by: INTERNAL MEDICINE

## 2025-07-22 PROCEDURE — 63600175 PHARM REV CODE 636 W HCPCS: Mod: HCNC | Performed by: PHYSICIAN ASSISTANT

## 2025-07-22 PROCEDURE — 36415 COLL VENOUS BLD VENIPUNCTURE: CPT | Mod: HCNC | Performed by: STUDENT IN AN ORGANIZED HEALTH CARE EDUCATION/TRAINING PROGRAM

## 2025-07-22 PROCEDURE — 25000003 PHARM REV CODE 250: Mod: HCNC | Performed by: STUDENT IN AN ORGANIZED HEALTH CARE EDUCATION/TRAINING PROGRAM

## 2025-07-22 PROCEDURE — 99223 1ST HOSP IP/OBS HIGH 75: CPT | Mod: HCNC,GC,, | Performed by: INTERNAL MEDICINE

## 2025-07-22 PROCEDURE — C1894 INTRO/SHEATH, NON-LASER: HCPCS | Mod: HCNC | Performed by: INTERNAL MEDICINE

## 2025-07-22 PROCEDURE — 83735 ASSAY OF MAGNESIUM: CPT | Mod: HCNC | Performed by: PHYSICIAN ASSISTANT

## 2025-07-22 PROCEDURE — 94640 AIRWAY INHALATION TREATMENT: CPT | Mod: HCNC

## 2025-07-22 PROCEDURE — 87340 HEPATITIS B SURFACE AG IA: CPT | Mod: HCNC | Performed by: INTERNAL MEDICINE

## 2025-07-22 PROCEDURE — C1769 GUIDE WIRE: HCPCS | Mod: HCNC | Performed by: INTERNAL MEDICINE

## 2025-07-22 PROCEDURE — 84100 ASSAY OF PHOSPHORUS: CPT | Mod: HCNC | Performed by: PHYSICIAN ASSISTANT

## 2025-07-22 PROCEDURE — 80048 BASIC METABOLIC PNL TOTAL CA: CPT | Mod: HCNC | Performed by: PHYSICIAN ASSISTANT

## 2025-07-22 PROCEDURE — 36415 COLL VENOUS BLD VENIPUNCTURE: CPT | Mod: HCNC | Performed by: PHYSICIAN ASSISTANT

## 2025-07-22 PROCEDURE — 99223 1ST HOSP IP/OBS HIGH 75: CPT | Mod: HCNC,,, | Performed by: INTERNAL MEDICINE

## 2025-07-22 PROCEDURE — 85730 THROMBOPLASTIN TIME PARTIAL: CPT | Mod: HCNC | Performed by: STUDENT IN AN ORGANIZED HEALTH CARE EDUCATION/TRAINING PROGRAM

## 2025-07-22 PROCEDURE — 85025 COMPLETE CBC W/AUTO DIFF WBC: CPT | Mod: HCNC | Performed by: PHYSICIAN ASSISTANT

## 2025-07-22 PROCEDURE — B2111ZZ FLUOROSCOPY OF MULTIPLE CORONARY ARTERIES USING LOW OSMOLAR CONTRAST: ICD-10-PCS | Performed by: INTERNAL MEDICINE

## 2025-07-22 PROCEDURE — 5A1D70Z PERFORMANCE OF URINARY FILTRATION, INTERMITTENT, LESS THAN 6 HOURS PER DAY: ICD-10-PCS | Performed by: INTERNAL MEDICINE

## 2025-07-22 PROCEDURE — 85610 PROTHROMBIN TIME: CPT | Mod: HCNC | Performed by: STUDENT IN AN ORGANIZED HEALTH CARE EDUCATION/TRAINING PROGRAM

## 2025-07-22 PROCEDURE — 25000003 PHARM REV CODE 250: Mod: HCNC | Performed by: PHYSICIAN ASSISTANT

## 2025-07-22 PROCEDURE — 99900035 HC TECH TIME PER 15 MIN (STAT): Mod: HCNC

## 2025-07-22 PROCEDURE — 25000003 PHARM REV CODE 250: Mod: HCNC | Performed by: NURSE PRACTITIONER

## 2025-07-22 PROCEDURE — 21400001 HC TELEMETRY ROOM: Mod: HCNC

## 2025-07-22 PROCEDURE — 90935 HEMODIALYSIS ONE EVALUATION: CPT | Mod: HCNC,,, | Performed by: INTERNAL MEDICINE

## 2025-07-22 PROCEDURE — 25000003 PHARM REV CODE 250: Mod: HCNC

## 2025-07-22 PROCEDURE — 63600175 PHARM REV CODE 636 W HCPCS: Mod: HCNC | Performed by: INTERNAL MEDICINE

## 2025-07-22 PROCEDURE — 63600175 PHARM REV CODE 636 W HCPCS: Mod: HCNC | Performed by: STUDENT IN AN ORGANIZED HEALTH CARE EDUCATION/TRAINING PROGRAM

## 2025-07-22 PROCEDURE — 25500020 PHARM REV CODE 255: Mod: HCNC | Performed by: INTERNAL MEDICINE

## 2025-07-22 PROCEDURE — 93454 CORONARY ARTERY ANGIO S&I: CPT | Mod: HCNC | Performed by: INTERNAL MEDICINE

## 2025-07-22 PROCEDURE — C1887 CATHETER, GUIDING: HCPCS | Mod: HCNC | Performed by: INTERNAL MEDICINE

## 2025-07-22 PROCEDURE — G0257 UNSCHED DIALYSIS ESRD PT HOS: HCPCS | Mod: HCNC

## 2025-07-22 RX ORDER — HEPARIN SODIUM,PORCINE/D5W 25000/250
0-40 INTRAVENOUS SOLUTION INTRAVENOUS CONTINUOUS
Status: DISCONTINUED | OUTPATIENT
Start: 2025-07-22 | End: 2025-07-22

## 2025-07-22 RX ORDER — METHYLPREDNISOLONE SOD SUCC 125 MG
125 VIAL (EA) INJECTION ONCE
Status: DISCONTINUED | OUTPATIENT
Start: 2025-07-22 | End: 2025-07-23 | Stop reason: HOSPADM

## 2025-07-22 RX ORDER — FENTANYL CITRATE 50 UG/ML
INJECTION, SOLUTION INTRAMUSCULAR; INTRAVENOUS
Status: DISCONTINUED | OUTPATIENT
Start: 2025-07-22 | End: 2025-07-22 | Stop reason: HOSPADM

## 2025-07-22 RX ORDER — HEPARIN SODIUM 1000 [USP'U]/ML
INJECTION, SOLUTION INTRAVENOUS; SUBCUTANEOUS
Status: DISCONTINUED | OUTPATIENT
Start: 2025-07-22 | End: 2025-07-22 | Stop reason: HOSPADM

## 2025-07-22 RX ORDER — SODIUM CHLORIDE 9 MG/ML
INJECTION, SOLUTION INTRAVENOUS
Status: DISCONTINUED | OUTPATIENT
Start: 2025-07-23 | End: 2025-07-23 | Stop reason: HOSPADM

## 2025-07-22 RX ORDER — DIPHENHYDRAMINE HYDROCHLORIDE 50 MG/ML
50 INJECTION, SOLUTION INTRAMUSCULAR; INTRAVENOUS ONCE
Status: DISCONTINUED | OUTPATIENT
Start: 2025-07-22 | End: 2025-07-23 | Stop reason: HOSPADM

## 2025-07-22 RX ORDER — SODIUM CHLORIDE 9 MG/ML
INJECTION, SOLUTION INTRAVENOUS ONCE
Status: DISCONTINUED | OUTPATIENT
Start: 2025-07-23 | End: 2025-07-23 | Stop reason: HOSPADM

## 2025-07-22 RX ORDER — HEPARIN SODIUM,PORCINE/D5W 25000/250
0-40 INTRAVENOUS SOLUTION INTRAVENOUS CONTINUOUS
Status: DISPENSED | OUTPATIENT
Start: 2025-07-22 | End: 2025-07-22

## 2025-07-22 RX ORDER — SODIUM CHLORIDE 0.9 % (FLUSH) 0.9 %
10 SYRINGE (ML) INJECTION
Status: DISCONTINUED | OUTPATIENT
Start: 2025-07-22 | End: 2025-07-23 | Stop reason: HOSPADM

## 2025-07-22 RX ORDER — DIPHENHYDRAMINE HCL 50 MG
50 CAPSULE ORAL ONCE
OUTPATIENT
Start: 2025-07-22 | End: 2025-07-22

## 2025-07-22 RX ORDER — FAMOTIDINE 10 MG/ML
40 INJECTION, SOLUTION INTRAVENOUS ONCE
Status: DISCONTINUED | OUTPATIENT
Start: 2025-07-22 | End: 2025-07-23 | Stop reason: HOSPADM

## 2025-07-22 RX ORDER — FAMOTIDINE 20 MG/1
20 TABLET, FILM COATED ORAL DAILY
Status: DISCONTINUED | OUTPATIENT
Start: 2025-07-22 | End: 2025-07-23 | Stop reason: HOSPADM

## 2025-07-22 RX ORDER — TALC
6 POWDER (GRAM) TOPICAL NIGHTLY PRN
Status: DISCONTINUED | OUTPATIENT
Start: 2025-07-22 | End: 2025-07-23 | Stop reason: HOSPADM

## 2025-07-22 RX ADMIN — GABAPENTIN 300 MG: 300 CAPSULE ORAL at 09:07

## 2025-07-22 RX ADMIN — FAMOTIDINE 20 MG: 20 TABLET, FILM COATED ORAL at 12:07

## 2025-07-22 RX ADMIN — ATORVASTATIN CALCIUM 80 MG: 40 TABLET, FILM COATED ORAL at 12:07

## 2025-07-22 RX ADMIN — APIXABAN 5 MG: 5 TABLET, FILM COATED ORAL at 09:07

## 2025-07-22 RX ADMIN — AMIODARONE HYDROCHLORIDE 200 MG: 200 TABLET ORAL at 12:07

## 2025-07-22 RX ADMIN — Medication 6 MG: at 01:07

## 2025-07-22 RX ADMIN — HEPARIN SODIUM AND DEXTROSE 12 UNITS/KG/HR: 10000; 5 INJECTION INTRAVENOUS at 05:07

## 2025-07-22 RX ADMIN — SEVELAMER CARBONATE 800 MG: 800 TABLET, FILM COATED ORAL at 04:07

## 2025-07-22 RX ADMIN — CLOPIDOGREL 75 MG: 75 TABLET ORAL at 12:07

## 2025-07-22 RX ADMIN — PREDNISONE 40 MG: 20 TABLET ORAL at 12:07

## 2025-07-22 NOTE — PROGRESS NOTES
HD tx complete  2.5 L removed over 3.5 hours, tolerated well  Blood returned, both needles removed  Gauze and tape applied, pressure held to each site for 7 minutes. Hemostasis achieved  Report given  to primary nurse Magali Melton transferred from unit via w/c by charge nurse DANISH Erwin RN back to room 742

## 2025-07-22 NOTE — CONSULTS
[facility] - Observation [facility]  Cardiology  Consult Note    Patient Name: Ibrahima Phelps  MRN: [id number]  Admission Date: 7/21/2025  Hospital Length of Stay: 0 days  Code Status: Full Code   Attending Provider: Griselda Phelps MD   Consulting Provider: Gregory Eisenberg MD  Primary Care Physician: Anatoliy Colindres MD  Principal Problem:Chronic obstructive pulmonary disease, unspecified    Patient information was obtained from patient, relative(s), past medical records, ER records, and primary team.     Inpatient consult to Cardiology  Consult performed by: Gregory Eisenberg MD  Consult ordered by: Leela Barriga PA-C  Reason for consult: Elevation of troponin evaluation of coronary artery disease, NSTEMI  Assessment/Recommendations: I have personally taken the history and examined the patient and agree with the resident's note as stated above.  Should have Interventional see.        Subjective:     HPI:   69 y.o. male with CAD s/p PCI 2006, PCI to RCA w/ 2 DIEGO 2/28/2024, paroxysmal Afib (on amiodarone and Eliquis), HFpEF, PAD, S/P kidney transplant on immunosuppressive therapy with Tacrolimus and prednisone, smoking presented to emergency room for development of shortness of breath today morning.  Patient was at the hospital today morning for his echocardiogram after good returning to the home he developed a shortness of breath associated with the chest pain substernal 4/10 nonradiating.  Patient reports his chest pain has been resolved after giving nitroglycerin into ED. he denies shortness of breath currently.  He is following Dr. V[place] cardiologist, last clinic visit on 7/10/25.  He is compliant to all his medicines that includes Eliquis 5 mg bid, plavix 75 mg daily, and atorvastatin 80 mg daily. He is no longer taking metoprolol succinate 25 mg daily or nifedipine 30 mg daily due to low blood pressures.    TTE 7/21/25    Left Ventricle: The left ventricle is normal in size. Ventricular mass is normal. Normal wall  thickness. Normal wall motion. There is normal systolic function with a visually estimated ejection fraction of 55 - 60%. There is indeterminate diastolic function.    Right Ventricle: The right ventricle is normal in size measuring 2.9 cmWall thickness is normal. . Systolic function is normal.    Aortic Valve: There is moderate aortic valve sclerosis. Mildly restricted motion. There is moderate stenosis. Aortic valve area by VTI is 1.1 cm2. Aortic valve peak velocity is 2.5 m/s. Mean gradient is 14 mmHg. The dimensionless index is 0.32. There is mild aortic regurgitation.    Tricuspid Valve: There is mild regurgitation.    Pulmonary Artery: There is pulmonary hypertension. The estimated pulmonary artery systolic pressure is 44 mmHg.    IVC/SVC: Normal venous pressure at 3 mmHg.        Past Medical History:   Diagnosis Date    Atrial fibrillation     CAD (coronary artery disease) 2006    MI in 2006    CHF (congestive heart failure) 2017    CKD (chronic kidney disease) stage 3, GFR 30-59 ml/min     Dr. Latif.  in transplanted kidney    CKD (chronic kidney disease) stage 5, GFR less than 15 ml/min 02/02/2024    COVID-19 02/07/2023    Diverticulosis     Hyperlipidemia     Hypertension     Keloid cicatrix     Metabolic bone disease     Other emphysema 10/01/2019    Pericarditis     S/P kidney transplant 1992    x2 (1992 & 2005),    Thrombocytopenia     Thyroid disease     Tobacco use 09/05/2014       Past Surgical History:   Procedure Laterality Date    AV FISTULA PLACEMENT Left 12/18/2023    Procedure: CREATION, AV FISTULA;  Surgeon: JUANI Melendez III, MD;  Location: [facility] OR [address] [place] [address];  Service: Vascular;  Laterality: Left;  LUE AVF creation vs AVG insertion    CARDIOVERSION  04/30/15    CHOLECYSTECTOMY      COLONOSCOPY  April 20, 2011    Diverticulosis, repeat recommended in 3 yrs., repeat colonoscopy 2014 revealed 2 polyps.  He should return in 5 years.    COLONOSCOPY N/A 3/13/2020    Procedure: COLONOSCOPY;   "Surgeon: Chon Casper MD;  Location: [facility] (4TH FLR);  Service: Endoscopy;  Laterality: N/A;  ok to hold coumadin x5days-see telephone encounter 2/4/20-tb    COLONOSCOPY N/A 2/4/2021    Procedure: COLONOSCOPY;  Surgeon: Chon Casper MD;  Location: [facility] (4TH FLR);  Service: Endoscopy;  Laterality: N/A;  Eliquis - per Dr. GAVIN Blunt ok to hold x 2 days and "restarted asap"- ERW  last prep "poor", [id number] ordered/ Pt requests Dr. Casper  prep ins. mailed - COVID screening on 2/1/21 PCW- ERW    COLONOSCOPY N/A 3/3/2021    Procedure: COLONOSCOPY;  Surgeon: Chon Casper MD;  Location: [facility] (4TH FLR);  Service: Endoscopy;  Laterality: N/A;  Patient with his second poor bowel pre and has poor prep today.  If patient not intersted or can't get colonoscopy tomorrow will need constipation bowel prep and will need to restart his Eliquis today.Thanks,Chon     per Dr Blunt-ok to hold Eliquis 2 days prior      COVID     COLONOSCOPY N/A 12/6/2024    Procedure: COLONOSCOPY;  Surgeon: Chon Casper MD;  Location: [facility] (2ND FLR);  Service: Endoscopy;  Laterality: N/A;  Plavix-Eliquis pending/ HD T-Th-Sat- lab ordered  2/25/24 ECHO PA 49  ref by / [facility] portal-RN  11/29-message to clearance nurse for plavix and eliquis hold-tb  ok to hold Eliquis 2 days per Dr Parmar  ok to hold Plavix 5 days per MEENA CUNNINGHAM-GT  12/2- p    CORONARY ANGIOGRAPHY N/A 2/28/2024    Procedure: ANGIOGRAM, CORONARY ARTERY;  Surgeon: Tylor Lincoln MD;  Location: [facility] CATH LAB;  Service: Cardiology;  Laterality: N/A;    CORONARY ANGIOPLASTY WITH STENT PLACEMENT  9/13/2006    FISTULOGRAM N/A 2/19/2024    Procedure: Fistulogram;  Surgeon: JUANI Melendez III, MD;  Location: [facility] CATH LAB;  Service: Peripheral Vascular;  Laterality: N/A;    FISTULOGRAM, WITH PTA Left 6/3/2024    Procedure: FISTULOGRAM, WITH PTA;  Surgeon: JUANI Melendez III, MD;  Location: [facility] OR [address] [place] [address];  Service: Vascular;  " Laterality: Left;  mGy: 51.58  GyCm2: 6.8285  Fluoro time: 5.1 min  Contrast: 28 cc    FISTULOGRAM, WITH PTA Left 3/5/2025    Procedure: FISTULOGRAM, WITH PTA;  Surgeon: JUANI Melendez III, MD;  Location: [facility] OR [address] [place] [address];  Service: Vascular;  Laterality: Left;    IRRIGATION AND DEBRIDEMENT Right 8/30/2023    Procedure: IRRIGATION AND DEBRIDEMENT, RIGHT MIDDLE FINGER;  Surgeon: Martin Larsen MD;  Location: [address];  Service: Orthopedics;  Laterality: Right;    KIDNEY TRANSPLANT  2005    LEFT HEART CATHETERIZATION N/A 2/26/2024    Procedure: Left heart cath;  Surgeon: Tylor Lincoln MD;  Location: [facility] CATH LAB;  Service: Cardiology;  Laterality: N/A;    PARATHYROIDECTOMY      PERCUTANEOUS TRANSLUMINAL ANGIOPLASTY OF ARTERIOVENOUS FISTULA Left 2/19/2024    Procedure: PTA, AV FISTULA;  Surgeon: JUANI Melendez III, MD;  Location: [facility] CATH LAB;  Service: Peripheral Vascular;  Laterality: Left;    STENT, DRUG ELUTING, SINGLE VESSEL, CORONARY N/A 2/28/2024    Procedure: Stent, Drug Eluting, Single Vessel, Coronary;  Surgeon: Tylor Lincoln MD;  Location: [facility] CATH LAB;  Service: Cardiology;  Laterality: N/A;    STENT, PERIPHERAL GRAFT Left 3/5/2025    Procedure: STENT, PERIPHERAL GRAFT;  Surgeon: JUANI Melendez III, MD;  Location: [address];  Service: Vascular;  Laterality: Left;  mGy 63.17  Gycm2 8.3386  flouro 63.17 min  contrast 38 ml    TREATMENT OF CARDIAC ARRHYTHMIA N/A 3/28/2022    Procedure: CARDIOVERSION;  Surgeon: Migue Blunt MD;  Location: [facility] EP LAB;  Service: Cardiology;  Laterality: N/A;  AF, DCC, MAC, GP, 3 PREP*RODRIGO deferred pt 100% compliant*    VENOPLASTY  6/3/2024    Procedure: ANGIOPLASTY, VEIN;  Surgeon: JUANI Melendez III, MD;  Location: [address];  Service: Vascular;;  Left subclavian       Review of patient's allergies indicates:   Allergen Reactions    Ace inhibitors Swelling    Iodine Itching    Verapamil Other (See Comments)     Other  reaction(s): Unknown    Povidone-iodine Itching       No current facility-administered medications on file prior to encounter.     Current Outpatient Medications on File Prior to Encounter   Medication Sig    acetaminophen (TYLENOL) 500 MG tablet Take 1 tablet (500 mg total) by mouth every 6 (six) hours as needed for Pain.    albuterol (VENTOLIN HFA) 90 mcg/actuation inhaler Inhale 2 puffs into the lungs every 6 (six) hours as needed for Wheezing or Shortness of Breath. Rescue    albuterol-ipratropium (DUO-NEB) 2.5 mg-0.5 mg/3 mL nebulizer solution Take 3 mLs by nebulization every 4 (four) hours as needed for Wheezing or Shortness of Breath. Rescue    amiodarone (PACERONE) 200 MG Tab TAKE 1 TABLET BY MOUTH EVERY DAY    atorvastatin (LIPITOR) 80 MG tablet Take 1 tablet (80 mg total) by mouth once daily.    b complex vitamins (B COMPLEX-VITAMIN B12) tablet Take 1 tablet by mouth once daily.    clopidogreL (PLAVIX) 75 mg tablet Take 1 tablet (75 mg total) by mouth once daily.    ELIQUIS 5 mg Tab TAKE 1 TABLET BY MOUTH TWICE A DAY    famotidine (PEPCID) 20 MG tablet Take 1 tablet (20 mg total) by mouth once daily. On dialysis days, please take this medicine after dialysis. Only taking on Tue, Thur, and  Sat.    fexofenadine HCl (ALLEGRA ORAL) Take 1 tablet by mouth daily as needed (allergies).    fluticasone furoate-vilanteroL (BREO) 100-25 mcg/dose diskus inhaler Inhale 1 puff into the lungs once daily. Controller. Use this inhaler every day.    fluticasone propionate (FLONASE) 50 mcg/actuation nasal spray 1 spray (50 mcg total) by Each Nostril route daily as needed for Allergies.    gabapentin (NEURONTIN) 300 MG capsule Take 1 capsule (300 mg total) by mouth every evening.    ipratropium (ATROVENT) 21 mcg (0.03 %) nasal spray 2 sprays by Each Nostril route 3 (three) times daily.    LIDOcaine (LIDODERM) 5 % Place 1 patch onto the skin once daily. Remove & Discard patch within 12 hours or as directed by MD    midodrine  (PROAMATINE) 5 MG Tab Take 1 tablet (5 mg total) by mouth 3 (three) times daily as needed (if blood pressure less than 100/60 mmHg).    multivit-min-FA-lycopen-lutein (CENTRUM SILVER) 0.4 mg-300 mcg- 250 mcg Tab Take 1 tablet by mouth once daily.    nitroGLYCERIN (NITROSTAT) 0.4 MG SL tablet Place 1 tablet (0.4 mg total) under the tongue every 5 (five) minutes as needed for Chest pain.    omeprazole (PRILOSEC) 20 MG capsule Take 1 capsule (20 mg total) by mouth daily as needed (heartburn).    sevelamer carbonate (RENVELA) 800 mg Tab This medicine is used to treat your phosphorus level. Please take 1 tablet (800 mg) once a day with the largest meal of the day.    torsemide (DEMADEX) 20 MG Tab Take 2 tablets (40 mg total) by mouth 3 (three) times a week.     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     Patient is currently retired   Substance and Sexual Activity    Alcohol use: No    Drug use: No    Sexual activity: Yes     Partners: Female     Review of Systems   Constitutional: Negative for chills and fever.   HENT:  Negative for congestion.    Eyes:  Negative for blurred vision and visual disturbance.   Cardiovascular:  Positive for chest pain. Negative for dyspnea on exertion, irregular heartbeat, leg swelling, near-syncope, orthopnea, palpitations, paroxysmal nocturnal dyspnea and syncope.   Respiratory:  Positive for shortness of breath. Negative for cough, sputum  production and wheezing.    Skin:  Negative for color change.   Musculoskeletal:  Negative for arthritis and muscle weakness.   Gastrointestinal:  Negative for bloating, abdominal pain, change in bowel habit, nausea and vomiting.   Genitourinary:  Negative for dysuria.   Neurological:  Negative for dizziness and light-headedness.   Psychiatric/Behavioral:  The patient is not nervous/anxious.      Objective:     Vital Signs (Most Recent):  Temp: 98.2 °F (36.8 °C) (07/22/25 0412)  Pulse: 75 (07/22/25 0412)  Resp: 18 (07/22/25 0412)  BP: (!) 167/70 (07/22/25 0412)  SpO2: 96 % (07/22/25 0412) Vital Signs (24h Range):  Temp:  [97.4 °F (36.3 °C)-98.8 °F (37.1 °C)] 98.2 °F (36.8 °C)  Pulse:  [] 75  Resp:  [16-36] 18  SpO2:  [80 %-100 %] 96 %  BP: (115-205)/() 167/70     Weight: 94.5 kg (208 lb 5.4 oz)  Body mass index is 27.49 kg/m².    SpO2: 96 %       No intake or output data in the 24 hours ending 07/22/25 0506    Lines/Drains/Airways       Peripheral Intravenous Line  Duration                  Hemodialysis AV Fistula Left upper arm -- days         Hemodialysis AV Fistula 02/19/24 0924 Left upper arm 518 days    Peripheral IV 07/21/25 0830 20 G Anterior;Right Forearm <1 day                     Physical Exam  Constitutional:       General: He is not in acute distress.     Appearance: Normal appearance. He is not ill-appearing.   HENT:      Head: Normocephalic and atraumatic.      Mouth/Throat:      Mouth: Mucous membranes are moist.   Eyes:      Extraocular Movements: Extraocular movements intact.      Pupils: Pupils are equal, round, and reactive to light.   Cardiovascular:      Rate and Rhythm: Normal rate and regular rhythm.      Heart sounds: No murmur heard.     No friction rub. No gallop.   Pulmonary:      Effort: No respiratory distress.      Breath sounds: No rales.   Abdominal:      General: Bowel sounds are normal. There is no distension.      Tenderness: There is no abdominal tenderness.  "  Musculoskeletal:      Cervical back: No rigidity.      Right lower leg: No edema.      Left lower leg: No edema.   Skin:     General: Skin is warm.      Capillary Refill: Capillary refill takes less than 2 seconds.   Neurological:      General: No focal deficit present.      Mental Status: He is alert and oriented to person, place, and time.   Psychiatric:         Mood and Affect: Mood normal.         Behavior: Behavior normal.          Significant Labs: BMP:   Recent Labs   Lab 07/21/25  0856 07/21/25  1923   GLU 74  --      --    K 4.7  --      --    CO2 24  --    BUN 35*  --    CREATININE 10.1*  --    CALCIUM 8.4*  --    MG  --  2.0   , CMP   Recent Labs   Lab 07/21/25  0856      K 4.7      CO2 24   GLU 74   BUN 35*   CREATININE 10.1*   CALCIUM 8.4*   PROT 7.1   ALBUMIN 3.1*   BILITOT 0.3   ALKPHOS 93   AST 24   ALT 16   ANIONGAP 11   , CBC   Recent Labs   Lab 07/21/25  0904 07/21/25  0917   WBC 8.33  --    HGB 11.2*  --    HCT 36.0* 34.2     --    , INR No results for input(s): "INR", "PROTIME" in the last 48 hours., Lipid Panel No results for input(s): "CHOL", "HDL", "LDLCALC", "TRIG", "CHOLHDL" in the last 48 hours., and Troponin   Recent Labs   Lab 07/21/25  1149 07/21/25  1526 07/21/25  1923   TROPONINIHS 64* 54* 62*       Significant Imaging: Echocardiogram: 2D echo with color flow doppler: No results found. However, due to the size of the patient record, not all encounters were searched. Please check Results Review for a complete set of results. and Transthoracic echo (TTE) complete (Cupid Only):   Results for orders placed or performed during the hospital encounter of 07/21/25   Echo   Result Value Ref Range    LV Diastolic Volume 96 mL    Echo EF Estimated 47 %    LV Systolic Volume 51 mL    IVS 1.1 0.6 - 1.1 cm    LVIDd 4.6 3.5 - 6.0 cm    LVIDs 3.5 2.1 - 4.0 cm    LVOT diameter 2.1 cm    PW 0.8 0.6 - 1.1 cm    IVRT 106 ms    AV LVOT peak gradient 3 mmHg    LVOT mn grad " "2 mmHg    LVOT peak aris 0.8 m/s    LVOT peak VTI 18.3 cm    RV- laughlin basal diam 2.9 cm    RV S' 12.05 cm/s    LA size 3.8 cm    Left Atrium Major Axis 4.9 cm    Left Atrium Minor Axis 5.2 cm    LA Vol (MOD) 50 mL    RA Major Axis 4.95 cm    RA Area 15.0 cm2    AV valve area 1.1 cm2    AV area by cont VTI 1.1 cm2    AV peak gradient 25 mmHg    AV mean gradient 14 mmHg    Ao peak aris 2.5 m/s    Ao VTI 56.5 cm    MV Peak A Aris 0.60 m/s    E wave deceleration time 259 ms    MV Peak E Aris 0.69 m/s    E/A ratio 1.15     LV LATERAL E/E' RATIO 7.7     LV SEPTAL E/E' RATIO 13.8     TDI LATERAL 0.09 m/s    TDI SEPTAL 0.05 m/s    TV peak gradient 41 mmHg    TR Max Aris 3.2 m/s    Ascending aorta 3.3 cm    STJ 2.9 cm    P vein A 0.2 m/s    MV "A" wave duration 194.10 ms    Pulm vein "A" wave 194.10 ms    PV Peak D Aris 0.28 m/s    PV Peak S Aris 0.40 m/s    IVC diameter 0.95 cm    Sinus 3.1 cm    RA Width 3.64 cm    TAPSE 1.6 cm    LA WIDTH 3.8 cm    BSA 2.17 m2    OHS CV CPX PATIENT HEIGHT IN 70.866     LVOT stroke volume 63.4 cm3    LV Systolic Volume Index 23.8 mL/m2    LV Diastolic Volume Index 44.86 mL/m2    LVOT area 3.5 cm2    FS 23.9 (A) 28 - 44 %    Left Ventricle Relative Wall Thickness 0.35 cm    LV mass 148.1 g    LV Mass Index 69.2 g/m2    E/E' ratio 10 m/s    CHRISTOFER 29 mL/m2    LA Vol 62 cm3    Pulm vein S/D ratio 1.43     RV/LV Ratio 0.63 cm    CHRISTOFER (MOD) 23 mL/m2    AV Velocity Ratio 0.32     AV index (prosthetic) 0.32     VERONICA by Velocity Ratio 1.1 cm²    ASI 1.4 cm/m2    Aortic Height Index (SOV) 1.7 cm/m    ASI 1.5 cm/m2    Aortic Height Index 1.8 cm/m    Mean e' 0.07 m/s    ZLVIDS -1.43     ZLVIDD -4.03     TV resting pulmonary artery pressure 44 mmHg    RV TB RVSP 6 mmHg    Est. RA pres 3 mmHg    Narrative      Left Ventricle: The left ventricle is normal in size. Ventricular mass   is normal. Normal wall thickness. Normal wall motion. There is normal   systolic function with a visually estimated ejection " fraction of 55 - 60%.   There is indeterminate diastolic function.    Right Ventricle: The right ventricle is normal in size measuring 2.9   cmWall thickness is normal. . Systolic function is normal.    Aortic Valve: There is moderate aortic valve sclerosis. Mildly   restricted motion. There is moderate stenosis. Aortic valve area by VTI is   1.1 cm2. Aortic valve peak velocity is 2.5 m/s. Mean gradient is 14 mmHg.   The dimensionless index is 0.32. There is mild aortic regurgitation.    Tricuspid Valve: There is mild regurgitation.    Pulmonary Artery: There is pulmonary hypertension. The estimated   pulmonary artery systolic pressure is 44 mmHg.    IVC/SVC: Normal venous pressure at 3 mmHg.       Assessment and Plan:     Chest pain in patient with CAD (coronary artery disease)   Latest Reference Range & Units 07/21/25 08:56 07/21/25 11:49 07/21/25 15:26 07/21/25 19:23   Troponin I High Sensitivity <=35 ng/L 56 (H)  65 (H) 64 (H) 54 (H) 62 (H)   EKG shows ST depression in inferolateral leads those are not new has been seen in previous EKGs  Today morning echo did not show any wall motion abnormalities  Also patient's chest pain has been recovered with sublingual nitroglycerin  Continue current management with home medicine statin, Eliquis, Plavix  Blood pressure was around 200/90 and AV ESRD on hemodialysis might have contributed to his elevation of troponin  This looks like more demand related elevation of troponin  Continue to monitor for chest pain or any symptoms related to acute coronary syndrome, if that develops we will consider starting ACS protocol        VTE Risk Mitigation (From admission, onward)           Ordered     apixaban tablet 5 mg  2 times daily         07/21/25 1322     IP VTE HIGH RISK PATIENT  Once         07/21/25 1225     Place sequential compression device  Until discontinued         07/21/25 1225                    Thank you for your consult. I will follow-up with patient. Please contact  us if you have any additional questions.    Gregory Eisenberg MD  Cardiology   [facility] - Observation 11H

## 2025-07-22 NOTE — PROGRESS NOTES
Vasc band removed per protocol over 1 hour period. Patient tolerated well. Gauze/tegaderm dressing placed.

## 2025-07-22 NOTE — PLAN OF CARE
Received report from RUSH Bae. Patient s/p [facility], AAOx3. VSS, no c/o pain or discomfort at this time, resp even and unlabored. Vasc band dressing to R wrist is CDI. No active bleeding. No hematoma noted. Post procedure protocol reviewed with patient and patient's family. Understanding verbalized. Family members at bedside. Nurse call bell within reach. Will continue to monitor per post procedure protocol.

## 2025-07-22 NOTE — PLAN OF CARE
Problem: Adult Inpatient Plan of Care  Goal: Plan of Care Review  Outcome: Progressing  Goal: Patient-Specific Goal (Individualized)  Outcome: Progressing  Goal: Absence of Hospital-Acquired Illness or Injury  Outcome: Progressing  Goal: Optimal Comfort and Wellbeing  Outcome: Progressing  Goal: Readiness for Transition of Care  Outcome: Progressing     Problem: Hemodialysis  Goal: Safe, Effective Therapy Delivery  Outcome: Progressing  Goal: Effective Tissue Perfusion  Outcome: Progressing  Goal: Absence of Infection Signs and Symptoms  Outcome: Progressing     Problem: Chronic Kidney Disease  Goal: Electrolyte Balance  Outcome: Progressing  Goal: Fluid Balance  Outcome: Progressing

## 2025-07-22 NOTE — ASSESSMENT & PLAN NOTE
Patient with chest pain and SOB, EKG with no acute changes, trop 64--54--62  Prev Hx of PCI to LAD (2006), prox-mid RCA (2024), mRCA(2024)    LHC (2/26/24)    The Prox LAD lesion was 60% stenosed.    The Mid LAD lesion was 70% stenosed.    The Prox RCA lesion was 95% stenosed.    The 1st Mrg lesion was 70% stenosed.    The 2nd Mrg lesion was 70% stenosed.    Was evaluated by CTS at the time and not deemed a surgical candidate given poor targets and multiple comorbidities.    Recommendation:  -will plan for LHC +/- PCI, keep NPO  LHC +/- PCI, patient is a DIEGO candidate  - Anti-Thrombotic therapy: Plavix  - Access: RRA  - Creatinine: ESRD   - Pre-Op Med: iv solumedrol, iv benadryl, iv pepcid  - All patient's questions were answered.  -The risks, benefits and alternatives of the procedure were explained to the patient.   -The risks of coronary angiography include but are not limited to: bleeding, infection, heart rhythm abnormalities, allergic reactions, kidney injury and potential need for dialysis, stroke and death.   - Should stenting be indicated, the patient has agreed to dual anti-platelet therapy with a drug-eluting stent   - Additionally, pt is aware that non-compliance is likely to result in stent clotting with heart attack, heart failure, and/or death  -The risks of moderate sedation include hypotension, respiratory depression, arrhythmias, bronchospasm, and death.   - Informed consent was obtained and the  patient is agreeable to proceed with the procedure.

## 2025-07-22 NOTE — HOSPITAL COURSE
Ibrahima Phelps is a 69 y.o. male admitted to  for COPD. Started on duo nebs and steroids with improvement in SOB. Will complete prednisone course outpatient. Reported 2 episodes of chest pain relieved by nitro. Trop mildly elevated. Cardiology consulted. Will stop Eliquis- start heparin gtt. IC consulted. LHC performed 7/22 which showed  of RCA distal to stent with collaterals. No intervention noted. Will continue Plavix and Eliquis outpatient per Cards recs. Hyperkalemia noted 7/23. Nephrology notified. Will complete another session of HD and will continue with HD T/TH/Sat outpatient. Patient seen on day of discharge and is medically ready for discharge. All questions answered at bedside. Patient agreeable to the plan.     Physical Exam  Gen: in NAD, appears stated age  Neuro: AAOx4  CVS: RRR, no m/r/g  Resp: lungs CTAB, no w/r/r; no belabored breathing or accessory muscle use appreciated

## 2025-07-22 NOTE — PLAN OF CARE
Inpatient Upgrade Note    Ibrahima Phelps has warranted treatment spanning two or more midnights of hospital level care for the management of chest pain, COPD exacerbation, and cardiac ischemia. He continues to require daily labs, further testing/imaging, monitoring of vital signs, medication adjustments, further evaluation by consultants, and cardiac catherization, IV steroids. His condition is also complicated by the following comorbidities: Coronary Artery Disease, Hypertension, Immunosuppression, Chronic respiratory disease, Chronic kidney disease, Heart failure, and s/p kidney transplant, emphysema, hemodialysis.

## 2025-07-22 NOTE — NURSING TRANSFER
Nursing Transfer Note      7/22/2025   4:37 PM    Nurse giving handoff:Tanner  Nurse receiving handoff:Magali    Reason patient is being transferred: post recovery    Transfer From: [facility]    Transfer via wheelchair    Transfer with cardiac monitoring    Transported by patient escort    Transfer Vital Signs:  Blood Pressure:140/63  Heart Rate:62  O2:98  Temperature:97.2  Respirations:16    Telemetry: Box Number [id number]  Order for Tele Monitor? Yes    Additional Lines:     Medicines sent: n/a    Any special needs or follow-up needed: monitor wrist puncture site for bleeding    Patient belongings transferred with patient: Yes, patient phone in hand    Chart send with patient: Yes

## 2025-07-22 NOTE — CONSULTS
[facility] - Observation 11H  Interventional Cardiology  Consult Note    Patient Name: Ibrahima Phelps  MRN: [id number]  Admission Date: 7/21/2025  Hospital Length of Stay: 0 days  Code Status: Full Code   Attending Provider: Griselda Phelps MD  Consulting Provider: Quiana Randolph MD  Primary Care Physician: Anatoliy Colindres MD  Principal Problem:Chronic obstructive pulmonary disease, unspecified    Patient information was obtained from patient, past medical records, and ER records.     Inpatient consult to Interventional Cardiology  Consult performed by: Quiana Randolph MD  Consult ordered by: Gautam Mancuso MD        Subjective:     HPI:  Mr Phelps is a 69yr old male with history of CAD (s/p PCI to?LAD 2006, prox to mid RCA and mRCA 2024), pAfib (on amio and eliquis), HFpEF (EF 55-60%), ESRD (on TTS HD via LUE AVF) s/p failed Kidney Transplant (1992 and 2005) on chronic immunosuppression and active smoking. Patient presented yesterday for a routine outpatient echocardiogram, on returning home noted worsening SOB and substernal chest pain prompting ED visit.    In the ED, vitals with /125, RR 22, O2 sat 97% on RA. EKG with sinus tachycardia with AVB (1st) with incomplete LBBB, no acute ST changes. Labs with hs-trop 64--54--62, BNP >300 (prev higher values).   Of note, patient with a positive PET earlier this month with a small basal-mid inferior and inferoseptal reversible perfusion abnormality involving 10% of LV myocardium in the distribution of the proximal RCA territory, pattern suggestive of a  of the RCA with insufficient collateralization. Additional area of inferoapical reversible perfusion abnormality involving 3% of LV myocardium in the distribution of the distal LAD territory.     Interventional Cardiology consulted for recommendations.    Patient on evaluation undergoing HD, denies SOB, chest pain. Vitals stable.    Past Medical History:   Diagnosis Date    Atrial fibrillation     CAD  "(coronary artery disease) 2006    MI in 2006    CHF (congestive heart failure) 2017    CKD (chronic kidney disease) stage 3, GFR 30-59 ml/min     Dr. Latif.  in transplanted kidney    CKD (chronic kidney disease) stage 5, GFR less than 15 ml/min 02/02/2024    COVID-19 02/07/2023    Diverticulosis     Hyperlipidemia     Hypertension     Keloid cicatrix     Metabolic bone disease     Other emphysema 10/01/2019    Pericarditis     S/P kidney transplant 1992    x2 (1992 & 2005),    Thrombocytopenia     Thyroid disease     Tobacco use 09/05/2014       Past Surgical History:   Procedure Laterality Date    AV FISTULA PLACEMENT Left 12/18/2023    Procedure: CREATION, AV FISTULA;  Surgeon: JUANI Melendez III, MD;  [facility];  Service: Vascular;  Laterality: Left;  LUE AVF creation vs AVG insertion    CARDIOVERSION  04/30/15    CHOLECYSTECTOMY      COLONOSCOPY  April 20, 2011    Diverticulosis, repeat recommended in 3 yrs., repeat colonoscopy 2014 revealed 2 polyps.  He should return in 5 years.    COLONOSCOPY N/A 3/13/2020    Procedure: COLONOSCOPY;  Surgeon: Chon Casper MD;  Location: [facility] (4TH FLR);  Service: Endoscopy;  Laterality: N/A;  ok to hold coumadin x5days-see telephone encounter 2/4/20-tb    COLONOSCOPY N/A 2/4/2021    Procedure: COLONOSCOPY;  Surgeon: Chon Casper MD;  Location: [facility] (4TH FLR);  Service: Endoscopy;  Laterality: N/A;  Eliquis - per Dr. GAVIN Blunt ok to hold x 2 days and "restarted asap"- ERW  last prep "poor", [id number] ordered/ Pt requests Dr. Casper  prep ins. mailed - COVID screening on 2/1/21 PCW- ERW    COLONOSCOPY N/A 3/3/2021    Procedure: COLONOSCOPY;  Surgeon: Chon Casper MD;  Location: [facility] (4TH FLR);  Service: Endoscopy;  Laterality: N/A;  Patient with his second poor bowel pre and has poor prep today.  If patient not intersted or can't get colonoscopy tomorrow will need constipation bowel prep and will need to restart his Eliquis " today.Chon Martinez     per Dr Blunt-ok to hold Eliquis 2 days prior      COVID     COLONOSCOPY N/A 12/6/2024    Procedure: COLONOSCOPY;  Surgeon: Chon Casper MD;  Location: [facility] ([address] [place] [address]);  Service: Endoscopy;  Laterality: N/A;  Plavix-Eliquis pending/ HD T-Th-Sat- lab ordered  2/25/24 ECHO PA 49  ref by / [facility] portal-RN  11/29-message to clearance nurse for plavix and eliquis hold-tb  ok to hold Eliquis 2 days per Dr Parmar  ok to hold Plavix 5 days per MEENA Jackson PA-GT  12/2- p    CORONARY ANGIOGRAPHY N/A 2/28/2024    Procedure: ANGIOGRAM, CORONARY ARTERY;  Surgeon: Tylor Lincoln MD;  Location: [facility] CATH LAB;  Service: Cardiology;  Laterality: N/A;    CORONARY ANGIOPLASTY WITH STENT PLACEMENT  9/13/2006    FISTULOGRAM N/A 2/19/2024    Procedure: Fistulogram;  Surgeon: JUANI Melendez III, MD;  Location: [facility] CATH LAB;  Service: Peripheral Vascular;  Laterality: N/A;    FISTULOGRAM, WITH PTA Left 6/3/2024    Procedure: FISTULOGRAM, WITH PTA;  Surgeon: JUANI Melendez III, MD;  [facility];  Service: Vascular;  Laterality: Left;  mGy: 51.58  GyCm2: 6.8285  Fluoro time: 5.1 min  Contrast: 28 cc    FISTULOGRAM, WITH PTA Left 3/5/2025    Procedure: FISTULOGRAM, WITH PTA;  Surgeon: JUANI Melendez III, MD;  [facility];  Service: Vascular;  Laterality: Left;    IRRIGATION AND DEBRIDEMENT Right 8/30/2023    Procedure: IRRIGATION AND DEBRIDEMENT, RIGHT MIDDLE FINGER;  Surgeon: Martin Larsen MD;  Location: [facility] OR [address] [place] [address];  Service: Orthopedics;  Laterality: Right;    KIDNEY TRANSPLANT  2005    LEFT HEART CATHETERIZATION N/A 2/26/2024    Procedure: Left heart cath;  Surgeon: Tylor Lincoln MD;  Location: [facility] CATH LAB;  Service: Cardiology;  Laterality: N/A;    PARATHYROIDECTOMY      PERCUTANEOUS TRANSLUMINAL ANGIOPLASTY OF ARTERIOVENOUS FISTULA Left 2/19/2024    Procedure: PTA, AV FISTULA;  Surgeon: JUANI Melendez III, MD;  Location: [facility]  CATH LAB;  Service: Peripheral Vascular;  Laterality: Left;    STENT, DRUG ELUTING, SINGLE VESSEL, CORONARY N/A 2/28/2024    Procedure: Stent, Drug Eluting, Single Vessel, Coronary;  Surgeon: Tylor Lincoln MD;  Location: [facility] CATH LAB;  Service: Cardiology;  Laterality: N/A;    STENT, PERIPHERAL GRAFT Left 3/5/2025    Procedure: STENT, PERIPHERAL GRAFT;  Surgeon: JUANI Melendez III, MD;  [facility];  Service: Vascular;  Laterality: Left;  mGy 63.17  Gycm2 8.3386  flouro 63.17 min  contrast 38 ml    TREATMENT OF CARDIAC ARRHYTHMIA N/A 3/28/2022    Procedure: CARDIOVERSION;  Surgeon: Migue Blunt MD;  Location: [facility] EP LAB;  Service: Cardiology;  Laterality: N/A;  AF, DCC, MAC, GP, 3 PREP*RODRIGO deferred pt 100% compliant*    VENOPLASTY  6/3/2024    Procedure: ANGIOPLASTY, VEIN;  Surgeon: JUANI Melendez III, MD;  [facility];  Service: Vascular;;  Left subclavian       Review of patient's allergies indicates:   Allergen Reactions    Ace inhibitors Swelling    Iodine Itching    Verapamil Other (See Comments)     Other reaction(s): Unknown    Povidone-iodine Itching       PTA Medications   Medication Sig    acetaminophen (TYLENOL) 500 MG tablet Take 1 tablet (500 mg total) by mouth every 6 (six) hours as needed for Pain.    albuterol (VENTOLIN HFA) 90 mcg/actuation inhaler Inhale 2 puffs into the lungs every 6 (six) hours as needed for Wheezing or Shortness of Breath. Rescue    albuterol-ipratropium (DUO-NEB) 2.5 mg-0.5 mg/3 mL nebulizer solution Take 3 mLs by nebulization every 4 (four) hours as needed for Wheezing or Shortness of Breath. Rescue    amiodarone (PACERONE) 200 MG Tab TAKE 1 TABLET BY MOUTH EVERY DAY    atorvastatin (LIPITOR) 80 MG tablet Take 1 tablet (80 mg total) by mouth once daily.    b complex vitamins (B COMPLEX-VITAMIN B12) tablet Take 1 tablet by mouth once daily.    clopidogreL (PLAVIX) 75 mg tablet Take 1 tablet (75 mg total) by mouth once daily.    ELIQUIS  5 mg Tab TAKE 1 TABLET BY MOUTH TWICE A DAY    famotidine (PEPCID) 20 MG tablet Take 1 tablet (20 mg total) by mouth once daily. On dialysis days, please take this medicine after dialysis. Only taking on Tue, Thur, and  Sat.    fexofenadine HCl (ALLEGRA ORAL) Take 1 tablet by mouth daily as needed (allergies).    fluticasone furoate-vilanteroL (BREO) 100-25 mcg/dose diskus inhaler Inhale 1 puff into the lungs once daily. Controller. Use this inhaler every day.    fluticasone propionate (FLONASE) 50 mcg/actuation nasal spray 1 spray (50 mcg total) by Each Nostril route daily as needed for Allergies.    gabapentin (NEURONTIN) 300 MG capsule Take 1 capsule (300 mg total) by mouth every evening.    ipratropium (ATROVENT) 21 mcg (0.03 %) nasal spray 2 sprays by Each Nostril route 3 (three) times daily.    LIDOcaine (LIDODERM) 5 % Place 1 patch onto the skin once daily. Remove & Discard patch within 12 hours or as directed by MD    midodrine (PROAMATINE) 5 MG Tab Take 1 tablet (5 mg total) by mouth 3 (three) times daily as needed (if blood pressure less than 100/60 mmHg).    multivit-min-FA-lycopen-lutein (CENTRUM SILVER) 0.4 mg-300 mcg- 250 mcg Tab Take 1 tablet by mouth once daily.    nitroGLYCERIN (NITROSTAT) 0.4 MG SL tablet Place 1 tablet (0.4 mg total) under the tongue every 5 (five) minutes as needed for Chest pain.    omeprazole (PRILOSEC) 20 MG capsule Take 1 capsule (20 mg total) by mouth daily as needed (heartburn).    sevelamer carbonate (RENVELA) 800 mg Tab This medicine is used to treat your phosphorus level. Please take 1 tablet (800 mg) once a day with the largest meal of the day.    torsemide (DEMADEX) 20 MG Tab Take 2 tablets (40 mg total) by mouth 3 (three) times a week.     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     Patient is currently retired   Substance and Sexual Activity    Alcohol use: No    Drug use: No    Sexual activity: Yes     Partners: Female     Review of Systems   Cardiovascular:  Negative for chest pain, near-syncope, orthopnea, palpitations and paroxysmal nocturnal dyspnea.   Respiratory:  Negative for cough and shortness of breath.    All other systems reviewed and are negative.    Objective:     Vital Signs (Most Recent):  Temp: 98.2 °F (36.8 °C) (07/22/25 0730)  Pulse: 66 (07/22/25 1030)  Resp: 18 (07/22/25 0730)  BP: (!) 107/55 (07/22/25 1030)  SpO2: 96 % (07/22/25 0719) Vital Signs (24h Range):  Temp:  [97.4 °F (36.3 °C)-98.8 °F (37.1 °C)] 98.2 °F (36.8 °C)  Pulse:  [] 66  Resp:  [16-36] 18  SpO2:  [80 %-100 %] 96 %  BP: (107-205)/() 107/55     Weight: 94.5 kg (208 lb 5.4 oz)  Body mass index is 27.49 kg/m².    SpO2: 96 %       No intake or output data in the 24 hours ending 07/22/25 1039    Lines/Drains/Airways       Peripheral Intravenous Line  Duration                  Hemodialysis AV Fistula 02/19/24 0924 Left upper arm 519 days    Peripheral IV 07/21/25 0830 20 G Anterior;Right Forearm 1 day                     Physical Exam  Constitutional:       Appearance: Normal appearance.   HENT:      Head: Normocephalic and atraumatic.      Nose: Nose normal.      Mouth/Throat:      Mouth: Mucous membranes are moist.   Eyes:      Conjunctiva/sclera: Conjunctivae normal.      Pupils: Pupils are equal, round, and reactive to light.   Cardiovascular:      Rate and Rhythm: Normal rate and regular rhythm.      Heart sounds: No murmur  heard.  Pulmonary:      Effort: Pulmonary effort is normal. No respiratory distress.      Breath sounds: Normal breath sounds. No wheezing or rales.   Abdominal:      General: Bowel sounds are normal.      Palpations: Abdomen is soft.   Musculoskeletal:      Cervical back: Normal range of motion.      Right lower leg: No edema.      Left lower leg: No edema.   Skin:     Capillary Refill: Capillary refill takes less than 2 seconds.   Neurological:      General: No focal deficit present.      Mental Status: He is alert and oriented to person, place, and time. Mental status is at baseline.   Psychiatric:         Mood and Affect: Mood normal.         Thought Content: Thought content normal.            Significant Labs:   Recent Lab Results  (Last 5 results in the past 24 hours)        07/22/25  0744   07/22/25  0444   07/21/25 2003 07/21/25  1923   07/21/25  1806        Anion Gap   15             Baso #   0.01             Basophil %   0.1             BUN   51             Calcium   8.2             Chloride   98             CO2   19             Creatinine   12.2  Comment: Action limit exceeded. Possible specimen integrity issue - Investigate             eGFR   4  Comment: Estimated GFR calculated using the CKD-EPI creatinine (2021) equation.             Eos #   0.02             Eos %   0.3             Glucose   112             Gran # (ANC)   6.68             Hematocrit   32.5             Hemoglobin   10.3             Hepatitis B Surface Ag Non-Reactive               Immature Grans (Abs)   0.04  Comment: Mild elevation in immature granulocytes is non specific and can be seen in a variety of conditions including stress response, acute inflammation, trauma and pregnancy. Correlation with other laboratory and clinical findings is essential.             Immature Granulocytes   0.5             Lymph #   0.61             Lymph %   8.2             Magnesium    2.2     2.0         MCH   26.9             MCHC   31.7              MCV   85             Mono #   0.09             Mono %   1.2             MPV   11.5             Neut %   89.7             nRBC   0             QRS Duration     110     102       [facility] QTC Calculation     465     482       Phosphorus Level   4.9             Platelet Count   144             Potassium   5.7  Comment: *No Visible Hemolysis             RBC   3.83             RDW   17.0             Sodium   132             Troponin I High Sensitivity       62         WBC   7.45                                    Significant Imaging:   Echo  Result Date: 7/21/2025    Left Ventricle: The left ventricle is normal in size. Ventricular mass   is normal. Normal wall thickness. Normal wall motion. There is normal   systolic function with a visually estimated ejection fraction of 55 - 60%.   There is indeterminate diastolic function.    Right Ventricle: The right ventricle is normal in size measuring 2.9   cmWall thickness is normal. . Systolic function is normal.    Aortic Valve: There is moderate aortic valve sclerosis. Mildly   restricted motion. There is moderate stenosis. Aortic valve area by VTI is   1.1 cm2. Aortic valve peak velocity is 2.5 m/s. Mean gradient is 14 mmHg.   The dimensionless index is 0.32. There is mild aortic regurgitation.    Tricuspid Valve: There is mild regurgitation.    Pulmonary Artery: There is pulmonary hypertension. The estimated   pulmonary artery systolic pressure is 44 mmHg.    IVC/SVC: Normal venous pressure at 3 mmHg.         Assessment and Plan:     Cardiac/Vascular  NSTEMI (non-ST elevated myocardial infarction)  Patient with chest pain and SOB, EKG with no acute changes, trop 64--54--62  Prev Hx of PCI to LAD (2006), prox-mid RCA (2024), mRCA(2024)    [facility] (2/26/24)    The Prox LAD lesion was 60% stenosed.    The Mid LAD lesion was 70% stenosed.    The Prox RCA lesion was 95% stenosed.    The 1st Mrg lesion was 70% stenosed.    The 2nd Mrg lesion was 70% stenosed.    Was evaluated by CTS at  the time and not deemed a surgical candidate given poor targets and multiple comorbidities.    Recommendation:  -will plan for LHC +/- PCI, keep NPO  LHC +/- PCI, patient is a DIEGO candidate  - Anti-Thrombotic therapy: Plavix  - Access: RRA  - Creatinine: ESRD   - Pre-Op Med: iv solumedrol, iv benadryl, iv pepcid  - All patient's questions were answered.  -The risks, benefits and alternatives of the procedure were explained to the patient.   -The risks of coronary angiography include but are not limited to: bleeding, infection, heart rhythm abnormalities, allergic reactions, kidney injury and potential need for dialysis, stroke and death.   - Should stenting be indicated, the patient has agreed to dual anti-platelet therapy with a drug-eluting stent   - Additionally, pt is aware that non-compliance is likely to result in stent clotting with heart attack, heart failure, and/or death  -The risks of moderate sedation include hypotension, respiratory depression, arrhythmias, bronchospasm, and death.   - Informed consent was obtained and the  patient is agreeable to proceed with the procedure.          VTE Risk Mitigation (From admission, onward)           Ordered     heparin 25,000 units in dextrose 5% (100 units/ml) IV bolus from bag LOW INTENSITY nomogram - [facility]  As needed (PRN)        Question:  Heparin Infusion Adjustment (DO NOT MODIFY ANSWER)  Answer:  [email or web address]\epic\Images\Pharmacy\HeparinInfusions\heparin LOW INTENSITY nomogram for [facility] [id number]    07/22/25 0912     heparin 25,000 units in dextrose 5% (100 units/ml) IV bolus from bag LOW INTENSITY nomogram - [facility]  As needed (PRN)        Question:  Heparin Infusion Adjustment (DO NOT MODIFY ANSWER)  Answer:  \[email or web address]\[email or web address]\Images\Pharmacy\HeparinInfusions\heparin LOW INTENSITY nomogram for [facility] [id number]    07/22/25 0912     heparin 25,000 units in dextrose 5% (100 units/ml) IV bolus from bag LOW INTENSITY nomogram - [facility]  Once        Question:  Heparin  Infusion Adjustment (DO NOT MODIFY ANSWER)  Answer:  [email or web address]\epic\Images\Pharmacy\HeparinInfusions\heparin LOW INTENSITY nomogram for [facility] [id number]    07/22/25 0912     heparin 25,000 units in dextrose 5% 250 mL (100 units/mL) infusion LOW INTENSITY nomogram - [facility]  Continuous        Question:  Begin at (units/kg/hr)  Answer:  12    07/22/25 0912     IP VTE HIGH RISK PATIENT  Once         07/21/25 1225     Place sequential compression device  Until discontinued         07/21/25 1225                    Thank you for your consult.     Quiana Randolph MD  Cardiovascular Disease Fellow, PGY-IV          IC STAFF  I have seen the patient, reviewed the Fellow's history and physical, assessment and plan. I have personally interviewed and examined the patient and agree with the findings.     AUTUMN Lincoln MD

## 2025-07-22 NOTE — PLAN OF CARE
Patient chart checked and seen on rounds this morning. Will continue to monitor. Interventional Cardiology has been consulted and we anticipate a possible Left Heart Cath later today. Keep NPO for now

## 2025-07-22 NOTE — ASSESSMENT & PLAN NOTE
Creatine stable for now. BMP reviewed- noted Estimated Creatinine Clearance: 6.4 mL/min (A) (based on SCr of 12.2 mg/dL (H)). according to latest data. Based on current GFR, CKD stage is end stage.  Monitor UOP and serial BMP and adjust therapy as needed. Renally dose meds. Avoid nephrotoxic medications and procedures.    - HD TTS, last dialyzed 7/19  - Nephrology consulted- HD completed 7/22

## 2025-07-22 NOTE — ASSESSMENT & PLAN NOTE
Latest Reference Range & Units 07/21/25 08:56 07/21/25 11:49 07/21/25 15:26 07/21/25 19:23   Troponin I High Sensitivity <=35 ng/L 56 (H)  65 (H) 64 (H) 54 (H) 62 (H)   EKG shows ST depression in inferolateral leads those are not new has been seen in previous EKGs  Today morning echo did not show any wall motion abnormalities  Also patient's chest pain has been recovered with sublingual nitroglycerin  Continue current management with home medicine statin, Eliquis, Plavix  Blood pressure was around 200/90 and AV ESRD on hemodialysis might have contributed to his elevation of troponin  This looks like more demand related elevation of troponin  Continue to monitor for chest pain or any symptoms related to acute coronary syndrome, if that develops we will consider starting ACS protocol

## 2025-07-22 NOTE — ED NOTES
Pt notified nurse that he is currently having CP & SOB. Chasidy NP notified. EKG obtained and Nitro given.

## 2025-07-22 NOTE — PROGRESS NOTES
Patient arrived on a stretcher to dialysis unit.   Report received from Magali Syed per dialysis Flowsheet.     Hemodialysis initiated using the following:     Dialysis Access: MIRA AVF     Needle size: 15 gauge  Insertion with no complications. Tolerated well, flows good     Will Maintain telemetry and blood pressure monitoring throughout treatment.  Refer to dialysis flowsheet and MAR for details.

## 2025-07-22 NOTE — ASSESSMENT & PLAN NOTE
Anemia is likely due to chronic disease due to ESRD. Most recent hemoglobin and hematocrit are listed below.  Recent Labs     07/21/25  0904 07/21/25  0917 07/22/25  0444   HGB 11.2*  --  10.3*   HCT 36.0* 34.2 32.5*     Plan  - Monitor serial CBC: Daily  - Transfuse PRBC if patient becomes hemodynamically unstable, symptomatic or H/H drops below 7/21.  - Patient has not received any PRBC transfusions to date  - Patient's anemia is currently stable

## 2025-07-22 NOTE — DISCHARGE INSTRUCTIONS
Discharge Instructions and Care of Your Wrist After a Cardiac Catheterization Procedure Performed via the Radial Artery    For 24 Hours following the procedure:  Do not subject your affected hand/arm to any forceful movements (i.e. supporting weight when rising from a chair or bed)  Do not drive a car for 24 hours  The dressing (band-aid) on the puncture site may be removed after 24 hours and left open to air.  If there is minor oozing, you may apply another Band-aid and remove after 12 hours  You may shower on the day following your procedure.  Do not take a tub bath or submerge the puncture site in water for 3 days following the procedure    For 72 hours following the procedure:   Do not lift anything heavier than 3 to 5 pounds with the affected hand/arm  Do not operate a lawnmower, motorcycle, chainsaw, or all-terrain vehicle   Avoid excessive (extension/flexion) wrist movement (i.e. supporting weight when rising from a chair or bed, push-ups, lifting garage doors, etc.)  Do not engage in vigorous exercise (i.e. Tennis, Golf, Bowling) using the affected arm    If bleeding should occur following discharge:  Sit down and apply firm pressure to the puncture site with your fingers for 10 minutes   If the bleeding stops, continue to sit quietly, keeping your wrist straight for 2 hours. Notify your physician as soon as possible   If bleeding does not stop after 10 minutes or if there is a large amount of bleeding or spurting, call 911 immediately.  Do not drive yourself to the hospital.     You should expect mild tingling in your hand and tenderness at the puncture site for up to 3 days.     Notify your physician if these symptoms persist or if you experience:  Change in color or temperature of the hand or arm  Redness, heat, or pus at the puncture site  Chills or fever greater than 101.0 F       Interventional Cardiology: Dr. Tylor Lincoln

## 2025-07-22 NOTE — PLAN OF CARE
Pt arrived to unit via stretcher, AAOX4. Able to verbalize needs. Resp even and unlabored. Tele monitor intact. Skin w/d to touch, intact. Anuric. Spouse at bedside. NAD noted at present. Will continue to monitor.  Problem: Adult Inpatient Plan of Care  Goal: Plan of Care Review  Outcome: Progressing  Goal: Patient-Specific Goal (Individualized)  Outcome: Progressing  Goal: Absence of Hospital-Acquired Illness or Injury  Outcome: Progressing  Goal: Optimal Comfort and Wellbeing  Outcome: Progressing  Goal: Readiness for Transition of Care  Outcome: Progressing     Problem: COPD (Chronic Obstructive Pulmonary Disease)  Goal: Optimal Chronic Illness Coping  Outcome: Progressing  Goal: Optimal Level of Functional [place]  Outcome: Progressing  Goal: Absence of Infection Signs and Symptoms  Outcome: Progressing  Goal: Improved Oral Intake  Outcome: Progressing  Goal: Effective Oxygenation and Ventilation  Outcome: Progressing     Problem: Hemodialysis  Goal: Safe, Effective Therapy Delivery  Outcome: Progressing  Goal: Effective Tissue Perfusion  Outcome: Progressing  Goal: Absence of Infection Signs and Symptoms  Outcome: Progressing     Problem: Sepsis/Septic Shock  Goal: Optimal Coping  Outcome: Progressing  Goal: Absence of Bleeding  Outcome: Progressing  Goal: Blood Glucose Level Within Targeted Range  Outcome: Progressing  Goal: Absence of Infection Signs and Symptoms  Outcome: Progressing  Goal: Optimal Nutrition Intake  Outcome: Progressing     Problem: Fall Injury Risk  Goal: Absence of Fall and Fall-Related Injury  Outcome: Progressing

## 2025-07-22 NOTE — PROGRESS NOTES
[facility]  Nephrology  Progress Note    Patient Name: Ibrahima Phelps  MRN: [id number]  Admission Date: 7/21/2025  Hospital Length of Stay: 0 days  Attending Provider: Griselda Phelps MD   Primary Care Physician: Anatoliy Colindres MD  Principal Problem:Chronic obstructive pulmonary disease, unspecified    Subjective:     HPI: Mr. Ibrahima Phelps is a 69-year-old male with history of coronary artery disease, MI, hypertension, atrial fibrillation, pericarditis, ESRD, presenting for shortness of breath.  Patient went to the hospital earlier today to undergo outpatient echocardiogram, when he went home to take a nap this morning, he was awoken out of his sleep with severe shortness of breath. He had audible wheezing, used his inhaler with moderate improvement in his symptoms. Reports his inhalers are nearly out.  He does not wear oxygen. He reports 1 week of dry nonproductive cough, moderate shortness of breath on exertion. Also associated burning chest pain. He denies orthopnea. On arrival to the ER here, he reports his shortness of breath has improved but not back to baseline.   He denies fever, chills, nausea, vomiting, decreased oral intake, diarrhea.His last HD session was on 7/19.   In ED, Pt hypertensive 191/125, RR 22, O2 sat 97% on RA. No leukocytosis. No significant electrolyte abnormalities. , trop 56>64. CXR: Heart size normal.  The lungs are clear.  No pleural effusion. ECG w/ NSR, no acute ischemia. Given duonebs and Iv solumedrol. Nephrology consulted for ESRD management.     Interval History: Patient seen at bedside this morning on iHD. He is tolerating well. Reports improvement in respiratory symptoms. Denies any acute concerns at this time.     Review of patient's allergies indicates:   Allergen Reactions    Ace inhibitors Swelling    Iodine Itching    Verapamil Other (See Comments)     Other reaction(s): Unknown    Povidone-iodine Itching     Current Facility-Administered  Medications   Medication Frequency    [START ON 7/23/2025] 0.9% NaCl infusion PRN    [START ON 7/23/2025] 0.9% NaCl infusion Once    acetaminophen tablet 650 mg Q8H PRN    albuterol-ipratropium 2.5 mg-0.5 mg/3 mL nebulizer solution 3 mL Q4H WAKE    albuterol-ipratropium 2.5 mg-0.5 mg/3 mL nebulizer solution 3 mL Q4H PRN    aluminum & magnesium hydroxide-simethicone 400-400-40 mg/5 mL suspension 30 mL Q6H PRN    amiodarone tablet 200 mg Daily    apixaban tablet 5 mg BID    atorvastatin tablet 80 mg Daily    clopidogreL tablet 75 mg Daily    famotidine tablet 20 mg Daily    fluticasone furoate-vilanteroL 100-25 mcg/dose diskus inhaler 1 puff Daily    gabapentin capsule 300 mg QHS    hydrALAZINE tablet 25 mg Q8H PRN    melatonin tablet 6 mg Nightly PRN    midodrine tablet 5 mg TID PRN    nitroGLYCERIN SL tablet 0.4 mg Q5 Min PRN    ondansetron disintegrating tablet 8 mg Q8H PRN    predniSONE tablet 40 mg Daily    prochlorperazine injection Soln 5 mg Q6H PRN    sevelamer carbonate tablet 800 mg Daily with dinner    sodium chloride 0.9% flush 3 mL PRN    torsemide tablet 40 mg Once per day on Monday Wednesday Friday       Objective:     Vital Signs (Most Recent):  Temp: 97.8 °F (36.6 °C) (07/22/25 0719)  Pulse: 64 (07/22/25 0815)  Resp: 20 (07/22/25 0719)  BP: (!) 116/58 (07/22/25 0815)  SpO2: 96 % (07/22/25 0719) Vital Signs (24h Range):  Temp:  [97.4 °F (36.3 °C)-98.8 °F (37.1 °C)] 97.8 °F (36.6 °C)  Pulse:  [] 64  Resp:  [16-36] 20  SpO2:  [80 %-100 %] 96 %  BP: (115-205)/() 116/58     Weight: 94.5 kg (208 lb 5.4 oz) (07/21/25 2202)  Body mass index is 27.49 kg/m².  Body surface area is 2.21 meters squared.    No intake/output data recorded.     Physical Exam     Significant Labs:  All labs within the past 24 hours have been reviewed.     Significant Imaging:  Labs: Reviewed  Assessment/Plan:     Pulmonary  * Chronic obstructive pulmonary disease, unspecified  -Management per primary  team    Cardiac/Vascular  Chest pain in patient with CAD (coronary artery disease)  -Management per primary and cardiology team    Renal/  ESRD (end stage renal disease) on dialysis  Nephrology History  -Outpatient HD unit: [facility]  -Nephrologist: Dr Gambino  -HD tx days: TTS  -Last HD tx: 7/19  -HD access: LUE AV fistula  -HD modality: iHD  -Residual urine: +  -EDW:  90 kg     -HD today with goal UF 2-2.5L as tolerated   -iHD per TTS while admitted unless emergent need arises  -Strict I&O, pre and post-dialysis weight  -Renal diet as tolerated  -Renal profile on dialysis days     H/O kidney transplant  Patient is 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 No longer on immunosuppressants.    Oncology  Anemia in chronic kidney disease  Hb at goal.     -Defer ISAURA to outpatient unit  -Trend Hb for goal 9-11 g/dl    Endocrine  Secondary renal hyperparathyroidism  -Continue home sevelamer  -Trend Ca/phos    Hyperkalemia  -HD today to assist with metabolic clearance  -Monitor electrolytes    Thank you for your consult. I will follow-up with patient. Please contact us if you have any additional questions.    Full attending attestation to follow.     Ruba Quinn MD  Nephrology PGY-4   [facility]

## 2025-07-22 NOTE — ASSESSMENT & PLAN NOTE
Nephrology History  -Outpatient HD unit: [facility]  -Nephrologist: Dr Gambino  -HD tx days: TTS  -Last HD tx: 7/19  -HD access: LUE AV fistula  -HD modality: iHD  -Residual urine: +  -EDW:  90 kg     -HD today with goal UF 2-2.5L as tolerated   -iHD per TTS while admitted unless emergent need arises  -Strict I&O, pre and post-dialysis weight  -Renal diet as tolerated  -Renal profile on dialysis days

## 2025-07-22 NOTE — PROGRESS NOTES
[facility] [address] [place] [address] Medicine  Progress Note    Patient Name: Ibrahima Phelps  MRN: [id number]  Patient Class: OP- Observation   Admission Date: 7/21/2025  Length of Stay: 0 days  Attending Physician: Griselda Phelps MD  Primary Care Provider: Anatoliy Colindres MD        Subjective     Principal Problem:NSTEMI (non-ST elevated myocardial infarction)        HPI:  Mr. Ibrahima Phelps is a 69-year-old male with history of coronary artery disease, MI, hypertension, atrial fibrillation, pericarditis, ESRD, presenting for shortness of breath.  Patient went to the hospital earlier today to undergo outpatient echocardiogram, when he went home to take a nap this morning, he was awoken out of his sleep with severe shortness of breath. He had audible wheezing, used his inhaler with moderate improvement in his symptoms. Reports his inhalers are nearly out.  He does not wear oxygen. He reports 1 week of dry nonproductive cough, moderate shortness of breath on exertion. Also associated burning chest pain. He denies orthopnea. On arrival to the ER here, he reports his shortness of breath has improved but not back to baseline.   He denies fever, chills, nausea, vomiting, decreased oral intake, diarrhea.    In ED, Pt hypertensive 191/125, RR 22, O2 sat 97% on RA. No leukocytosis. No significant electrolyte abnormalities. , trop 56>64. CXR: Heart size normal.  The lungs are clear.  No pleural effusion. ECG w/ NSR, no acute ischemia. Given duonebs and Iv solumedrol.     Overview/Hospital Course:  Ibrahima Phelps is a 69 y.o. male admitted to  for COPD. Started on duo nebs and steroids with improvement in SOB. Reported 2 episodes of chest pain relieved by nitro. Trop mildly elevated. Cardiology consulted. Will stop Eliquis- start heparin gtt. IC consulted.     Interval History: Reported two episodes of substernal chest pain yesterday. Relieved with nitro. Cardiology consulted. Switched Eliquis to heparin gtt- IC  consulted. Plan for [facility] today. Reports SOB improved s/p duo nebs and steroids.     Review of Systems   Constitutional:  Negative for chills and fever.   Respiratory:  Positive for shortness of breath (improved). Negative for chest tightness.    Cardiovascular:  Positive for chest pain (resolved). Negative for leg swelling.   Gastrointestinal:  Negative for abdominal pain and nausea.   Neurological:  Negative for dizziness and weakness.     Objective:     Vital Signs (Most Recent):  Temp: 98.2 °F (36.8 °C) (07/22/25 0730)  Pulse: 63 (07/22/25 1100)  Resp: 18 (07/22/25 0730)  BP: (!) 115/57 (07/22/25 1100)  SpO2: 96 % (07/22/25 0719) Vital Signs (24h Range):  Temp:  [97.4 °F (36.3 °C)-98.8 °F (37.1 °C)] 98.2 °F (36.8 °C)  Pulse:  [] 63  Resp:  [16-36] 18  SpO2:  [80 %-100 %] 96 %  BP: (107-205)/() 115/57     Weight: 94.5 kg (208 lb 5.4 oz)  Body mass index is 27.49 kg/m².  No intake or output data in the 24 hours ending 07/22/25 1103      Physical Exam  Vitals and nursing note reviewed.   Constitutional:       General: He is not in acute distress.     Appearance: He is well-developed. He is not ill-appearing.   HENT:      Head: Normocephalic and atraumatic.   Eyes:      Pupils: Pupils are equal, round, and reactive to light.   Cardiovascular:      Rate and Rhythm: Normal rate and regular rhythm.   Pulmonary:      Effort: Pulmonary effort is normal. No respiratory distress.      Breath sounds: Normal breath sounds. No wheezing.   Abdominal:      Palpations: Abdomen is soft.      Tenderness: There is no abdominal tenderness.   Musculoskeletal:         General: No tenderness.   Skin:     General: Skin is warm and dry.      Comments: LUE AV fistula    Neurological:      Mental Status: He is alert and oriented to person, place, and time.   Psychiatric:         Behavior: Behavior normal.               Significant Labs: All pertinent labs within the past 24 hours have been reviewed.    Significant Imaging: I have  reviewed all pertinent imaging results/findings within the past 24 hours.      Assessment & Plan  NSTEMI (non-ST elevated myocardial infarction)  Chest pain in patient with CAD (coronary artery disease)  Elevated troponin  History of non-ST elevation myocardial infarction (NSTEMI)  Stented coronary artery  Patient with known CAD s/p stent placement, which is controlled Will continue ASA, Plavix, and Statin and monitor for S/Sx of angina/ACS. Continue to monitor on telemetry.     - trop 56>64> 54> 62, trending  - ECG w/ NSR, nonischemic  - echo with EF 55-60% with indeterminate diastolic dysfunction  - Cardiology consulted- start heparin gtt, NPO  - IC consulted- plan for C  - continue tele  - prn SL NTG  Chronic obstructive pulmonary disease, unspecified  Other emphysema  Patient's COPD is with exacerbation noted by continued dyspnea currently.  Patient is currently on COPD Pathway. Continue scheduled inhalers Steroids and Supplemental oxygen and monitor respiratory status closely.     - O2 sat 97% on RA  - No leukocytosis  - CXR: Heart size normal.  The lungs are clear.  No pleural effusion.   - ECG w/ NSR, no acute ischemia.   - s/p duonebs and IV solumedrol  - continue prednisone 40 mg qd  - duonebs q4h   - O2 NC prn  - incentive spirometry  - continue monitoring  Mixed hyperlipidemia  - continue statin  Paroxysmal atrial fibrillation  Patient has paroxysmal (<7 days) atrial fibrillation. Patient is currently in sinus rhythm. [id number] Score: 2. The patients heart rate in the last 24 hours is as follows:  Pulse  Min: 61  Max: 112     Antiarrhythmics  amiodarone tablet 200 mg, Daily, Oral    Anticoagulants  heparin 25,000 units in dextrose 5% (100 units/ml) IV bolus from bag LOW INTENSITY nomogram - [facility], Once, Intravenous  heparin 25,000 units in dextrose 5% 250 mL (100 units/mL) infusion LOW INTENSITY nomogram - [facility], Continuous, Intravenous  heparin 25,000 units in dextrose 5% (100 units/ml) IV bolus from bag  LOW INTENSITY nomogram - [facility], As needed (PRN), Intravenous  heparin 25,000 units in dextrose 5% (100 units/ml) IV bolus from bag LOW INTENSITY nomogram - [facility], As needed (PRN), Intravenous    Plan  - Replete lytes with a goal of K>4, Mg >2  - Patient is anticoagulated, see medications listed above.  - Patient's afib is currently controlled  Former smoker  Dangers of cigarette smoking were reviewed with patient in detail for 3 minutes and patient was encouraged to continue cessation. Nicotine replacement options were discussed, patient no longer smokes.  ESRD (end stage renal disease)  H/O kidney transplant  Kidney transplant as cause of abnormal reaction or later complication  Creatine stable for now. BMP reviewed- noted Estimated Creatinine Clearance: 6.4 mL/min (A) (based on SCr of 12.2 mg/dL (H)). according to latest data. Based on current GFR, CKD stage is end stage.  Monitor UOP and serial BMP and adjust therapy as needed. Renally dose meds. Avoid nephrotoxic medications and procedures.    - HD TTS, last dialyzed 7/19  - Nephrology consulted- HD completed 7/22  GERD (gastroesophageal reflux disease)  - continue famotidine  Anemia in chronic kidney disease  Anemia is likely due to chronic disease due to ESRD. Most recent hemoglobin and hematocrit are listed below.  Recent Labs     07/21/25  0904 07/21/25  0917 07/22/25  0444   HGB 11.2*  --  10.3*   HCT 36.0* 34.2 32.5*     Plan  - Monitor serial CBC: Daily  - Transfuse PRBC if patient becomes hemodynamically unstable, symptomatic or H/H drops below 7/21.  - Patient has not received any PRBC transfusions to date  - Patient's anemia is currently stable  Secondary renal hyperparathyroidism  - continue phos binder  VTE Risk Mitigation (From admission, onward)           Ordered     heparin 25,000 units in dextrose 5% (100 units/ml) IV bolus from bag LOW INTENSITY nomogram - [facility]  As needed (PRN)        Question:  Heparin Infusion Adjustment (DO NOT MODIFY ANSWER)   Answer:  [email or web address]\Images\Pharmacy\HeparinInfusions\heparin LOW INTENSITY nomogram for [facility] [id number]    07/22/25 0912     heparin 25,000 units in dextrose 5% (100 units/ml) IV bolus from bag LOW INTENSITY nomogram - [facility]  As needed (PRN)        Question:  Heparin Infusion Adjustment (DO NOT MODIFY ANSWER)  Answer:  [email or web address]\Images\Pharmacy\HeparinInfusions\heparin LOW INTENSITY nomogram for [facility] [id number]    07/22/25 0912     heparin 25,000 units in dextrose 5% (100 units/ml) IV bolus from bag LOW INTENSITY nomogram - [facility]  Once        Question:  Heparin Infusion Adjustment (DO NOT MODIFY ANSWER)  Answer:  [email or web address]\[email or web address]\Images\Pharmacy\HeparinInfusions\heparin LOW INTENSITY nomogram for [facility] [id number]    07/22/25 0912     heparin 25,000 units in dextrose 5% 250 mL (100 units/mL) infusion LOW INTENSITY nomogram - [facility]  Continuous        Question:  Begin at (units/kg/hr)  Answer:  12    07/22/25 0912     IP VTE HIGH RISK PATIENT  Once         07/21/25 1225     Place sequential compression device  Until discontinued         07/21/25 1225                    Discharge Planning   RAMU: 7/23/2025     Code Status: Full Code   Medical Readiness for Discharge Date:   Discharge Plan A: Home, Home with family                        Jaja Mcknight PA-C  Department of Hospital Medicine   [facility]

## 2025-07-22 NOTE — BRIEF OP NOTE
Brief Operative Note:    : Tylor Lincoln MD     Referring Physician: Self,Aaareferral     All Operators: Surgeon(s):  Jenkins, James S., MD Subramaniam, Venkat, MD Gillies, Connor M, DO  , MD Kane     Preoperative Diagnosis: Chest pain [R07.9]  NSTEMI (non-ST elevated myocardial infarction) [I21.4]     Postop Diagnosis: Chest pain [R07.9]  NSTEMI (non-ST elevated myocardial infarction) [I21.4]    Treatments/Procedures: Procedure(s) (LRB):  ANGIOGRAM, CORONARY ARTERY (N/A)    Findings:  of the RCA distal to stents. There is collateralization from the septals. Moderate distal LAD disease. See catheterization report for full details.    Estimated Blood loss: 20 cc    Specimens removed: No    Recommendations:   - Routine post-cath care as per orders  - Bed rest 2 hours  - Continue DAPT  - Optimize medical therapy. Add anti-anginals as tolerated  - LDL goal < 55  - A1c goal < 7  - Stop smoking if applicable    Connor M Gillies

## 2025-07-22 NOTE — SUBJECTIVE & OBJECTIVE
Interval History: Reported two episodes of substernal chest pain yesterday. Relieved with nitro. Cardiology consulted. Switched Eliquis to heparin gtt- IC consulted. Plan for [facility] today. Reports SOB improved s/p duo nebs and steroids.     Review of Systems   Constitutional:  Negative for chills and fever.   Respiratory:  Positive for shortness of breath (improved). Negative for chest tightness.    Cardiovascular:  Positive for chest pain (resolved). Negative for leg swelling.   Gastrointestinal:  Negative for abdominal pain and nausea.   Neurological:  Negative for dizziness and weakness.     Objective:     Vital Signs (Most Recent):  Temp: 98.2 °F (36.8 °C) (07/22/25 0730)  Pulse: 63 (07/22/25 1100)  Resp: 18 (07/22/25 0730)  BP: (!) 115/57 (07/22/25 1100)  SpO2: 96 % (07/22/25 0719) Vital Signs (24h Range):  Temp:  [97.4 °F (36.3 °C)-98.8 °F (37.1 °C)] 98.2 °F (36.8 °C)  Pulse:  [] 63  Resp:  [16-36] 18  SpO2:  [80 %-100 %] 96 %  BP: (107-205)/() 115/57     Weight: 94.5 kg (208 lb 5.4 oz)  Body mass index is 27.49 kg/m².  No intake or output data in the 24 hours ending 07/22/25 1103      Physical Exam  Vitals and nursing note reviewed.   Constitutional:       General: He is not in acute distress.     Appearance: He is well-developed. He is not ill-appearing.   HENT:      Head: Normocephalic and atraumatic.   Eyes:      Pupils: Pupils are equal, round, and reactive to light.   Cardiovascular:      Rate and Rhythm: Normal rate and regular rhythm.   Pulmonary:      Effort: Pulmonary effort is normal. No respiratory distress.      Breath sounds: Normal breath sounds. No wheezing.   Abdominal:      Palpations: Abdomen is soft.      Tenderness: There is no abdominal tenderness.   Musculoskeletal:         General: No tenderness.   Skin:     General: Skin is warm and dry.      Comments: LUE AV fistula    Neurological:      Mental Status: He is alert and oriented to person, place, and time.   Psychiatric:          Behavior: Behavior normal.               Significant Labs: All pertinent labs within the past 24 hours have been reviewed.    Significant Imaging: I have reviewed all pertinent imaging results/findings within the past 24 hours.

## 2025-07-22 NOTE — SUBJECTIVE & OBJECTIVE
"Past Medical History:   Diagnosis Date    Atrial fibrillation     CAD (coronary artery disease) 2006    MI in 2006    CHF (congestive heart failure) 2017    CKD (chronic kidney disease) stage 3, GFR 30-59 ml/min     Dr. Latif.  in transplanted kidney    CKD (chronic kidney disease) stage 5, GFR less than 15 ml/min 02/02/2024    COVID-19 02/07/2023    Diverticulosis     Hyperlipidemia     Hypertension     Keloid cicatrix     Metabolic bone disease     Other emphysema 10/01/2019    Pericarditis     S/P kidney transplant 1992    x2 (1992 & 2005),    Thrombocytopenia     Thyroid disease     Tobacco use 09/05/2014       Past Surgical History:   Procedure Laterality Date    AV FISTULA PLACEMENT Left 12/18/2023    Procedure: CREATION, AV FISTULA;  Surgeon: JUANI Melendez III, MD;  [facility];  Service: Vascular;  Laterality: Left;  LUE AVF creation vs AVG insertion    CARDIOVERSION  04/30/15    CHOLECYSTECTOMY      COLONOSCOPY  April 20, 2011    Diverticulosis, repeat recommended in 3 yrs., repeat colonoscopy 2014 revealed 2 polyps.  He should return in 5 years.    COLONOSCOPY N/A 3/13/2020    Procedure: COLONOSCOPY;  Surgeon: Chon Casper MD;  Location: [facility] (4TH FLR);  Service: Endoscopy;  Laterality: N/A;  ok to hold coumadin x5days-see telephone encounter 2/4/20-tb    COLONOSCOPY N/A 2/4/2021    Procedure: COLONOSCOPY;  Surgeon: Chon Casper MD;  Location: [facility] (4TH FLR);  Service: Endoscopy;  Laterality: N/A;  Eliquis - per Dr. GAVIN Blunt ok to hold x 2 days and "restarted asap"- ERW  last prep "poor", [id number] ordered/ Pt requests Dr. Casper  prep ins. mailed - COVID screening on 2/1/21 PCW- ERW    COLONOSCOPY N/A 3/3/2021    Procedure: COLONOSCOPY;  Surgeon: Chon Csaper MD;  Location: [facility] (4TH FLR);  Service: Endoscopy;  Laterality: N/A;  Patient with his second poor bowel pre and has poor prep today.  If patient not intersted or can't get colonoscopy tomorrow will " need constipation bowel prep and will need to restart his Eliquis today.Thanks,Chon     per Dr Blunt-ok to hold Eliquis 2 days prior      COVID     COLONOSCOPY N/A 12/6/2024    Procedure: COLONOSCOPY;  Surgeon: Chon Casper MD;  Location: [facility] (2ND FLR);  Service: Endoscopy;  Laterality: N/A;  Plavix-Eliquis pending/ HD T-Th-Sat- lab ordered  2/25/24 ECHO PA 49  ref by / [facility] portal-RN  11/29-message to clearance nurse for plavix and eliquis hold-tb  ok to hold Eliquis 2 days per Dr Parmar  ok to hold Plavix 5 days per MEENA COLVINGT  12/2- p    CORONARY ANGIOGRAPHY N/A 2/28/2024    Procedure: ANGIOGRAM, CORONARY ARTERY;  Surgeon: Tylor Lincoln MD;  Location: [facility] CATH LAB;  Service: Cardiology;  Laterality: N/A;    CORONARY ANGIOPLASTY WITH STENT PLACEMENT  9/13/2006    FISTULOGRAM N/A 2/19/2024    Procedure: Fistulogram;  Surgeon: JUANI Melendez III, MD;  Location: [facility] CATH LAB;  Service: Peripheral Vascular;  Laterality: N/A;    FISTULOGRAM, WITH PTA Left 6/3/2024    Procedure: FISTULOGRAM, WITH PTA;  Surgeon: JUANI Melendez III, MD;  [facility];  Service: Vascular;  Laterality: Left;  mGy: 51.58  GyCm2: 6.8285  Fluoro time: 5.1 min  Contrast: 28 cc    FISTULOGRAM, WITH PTA Left 3/5/2025    Procedure: FISTULOGRAM, WITH PTA;  Surgeon: JUANI Melendez III, MD;  [facility];  Service: Vascular;  Laterality: Left;    IRRIGATION AND DEBRIDEMENT Right 8/30/2023    Procedure: IRRIGATION AND DEBRIDEMENT, RIGHT MIDDLE FINGER;  Surgeon: Martin Larsen MD;  [facility];  Service: Orthopedics;  Laterality: Right;    KIDNEY TRANSPLANT  2005    LEFT HEART CATHETERIZATION N/A 2/26/2024    Procedure: Left heart cath;  Surgeon: Tylor Lincoln MD;  Location: [facility] CATH LAB;  Service: Cardiology;  Laterality: N/A;    PARATHYROIDECTOMY      PERCUTANEOUS TRANSLUMINAL ANGIOPLASTY OF ARTERIOVENOUS FISTULA Left 2/19/2024    Procedure: PTA,  AV FISTULA;  Surgeon: JUANI Melendez III, MD;  Location: [facility] CATH LAB;  Service: Peripheral Vascular;  Laterality: Left;    STENT, DRUG ELUTING, SINGLE VESSEL, CORONARY N/A 2/28/2024    Procedure: Stent, Drug Eluting, Single Vessel, Coronary;  Surgeon: Tylor Lincoln MD;  Location: [facility] CATH LAB;  Service: Cardiology;  Laterality: N/A;    STENT, PERIPHERAL GRAFT Left 3/5/2025    Procedure: STENT, PERIPHERAL GRAFT;  Surgeon: JUANI Melendez III, MD;  [facility];  Service: Vascular;  Laterality: Left;  mGy 63.17  Gycm2 8.3386  flouro 63.17 min  contrast 38 ml    TREATMENT OF CARDIAC ARRHYTHMIA N/A 3/28/2022    Procedure: CARDIOVERSION;  Surgeon: Migue Blunt MD;  Location: [facility] EP LAB;  Service: Cardiology;  Laterality: N/A;  AF, DCC, MAC, GP, 3 PREP*RODRIGO deferred pt 100% compliant*    VENOPLASTY  6/3/2024    Procedure: ANGIOPLASTY, VEIN;  Surgeon: JUANI Melendez III, MD;  [facility];  Service: Vascular;;  Left subclavian       Review of patient's allergies indicates:   Allergen Reactions    Ace inhibitors Swelling    Iodine Itching    Verapamil Other (See Comments)     Other reaction(s): Unknown    Povidone-iodine Itching       No current facility-administered medications on file prior to encounter.     Current Outpatient Medications on File Prior to Encounter   Medication Sig    acetaminophen (TYLENOL) 500 MG tablet Take 1 tablet (500 mg total) by mouth every 6 (six) hours as needed for Pain.    albuterol (VENTOLIN HFA) 90 mcg/actuation inhaler Inhale 2 puffs into the lungs every 6 (six) hours as needed for Wheezing or Shortness of Breath. Rescue    albuterol-ipratropium (DUO-NEB) 2.5 mg-0.5 mg/3 mL nebulizer solution Take 3 mLs by nebulization every 4 (four) hours as needed for Wheezing or Shortness of Breath. Rescue    amiodarone (PACERONE) 200 MG Tab TAKE 1 TABLET BY MOUTH EVERY DAY    atorvastatin (LIPITOR) 80 MG tablet Take 1 tablet (80 mg total) by mouth once daily.     b complex vitamins (B COMPLEX-VITAMIN B12) tablet Take 1 tablet by mouth once daily.    clopidogreL (PLAVIX) 75 mg tablet Take 1 tablet (75 mg total) by mouth once daily.    ELIQUIS 5 mg Tab TAKE 1 TABLET BY MOUTH TWICE A DAY    famotidine (PEPCID) 20 MG tablet Take 1 tablet (20 mg total) by mouth once daily. On dialysis days, please take this medicine after dialysis. Only taking on Tue, Thur, and  Sat.    fexofenadine HCl (ALLEGRA ORAL) Take 1 tablet by mouth daily as needed (allergies).    fluticasone furoate-vilanteroL (BREO) 100-25 mcg/dose diskus inhaler Inhale 1 puff into the lungs once daily. Controller. Use this inhaler every day.    fluticasone propionate (FLONASE) 50 mcg/actuation nasal spray 1 spray (50 mcg total) by Each Nostril route daily as needed for Allergies.    gabapentin (NEURONTIN) 300 MG capsule Take 1 capsule (300 mg total) by mouth every evening.    ipratropium (ATROVENT) 21 mcg (0.03 %) nasal spray 2 sprays by Each Nostril route 3 (three) times daily.    LIDOcaine (LIDODERM) 5 % Place 1 patch onto the skin once daily. Remove & Discard patch within 12 hours or as directed by MD    midodrine (PROAMATINE) 5 MG Tab Take 1 tablet (5 mg total) by mouth 3 (three) times daily as needed (if blood pressure less than 100/60 mmHg).    multivit-min-FA-lycopen-lutein (CENTRUM SILVER) 0.4 mg-300 mcg- 250 mcg Tab Take 1 tablet by mouth once daily.    nitroGLYCERIN (NITROSTAT) 0.4 MG SL tablet Place 1 tablet (0.4 mg total) under the tongue every 5 (five) minutes as needed for Chest pain.    omeprazole (PRILOSEC) 20 MG capsule Take 1 capsule (20 mg total) by mouth daily as needed (heartburn).    sevelamer carbonate (RENVELA) 800 mg Tab This medicine is used to treat your phosphorus level. Please take 1 tablet (800 mg) once a day with the largest meal of the day.    torsemide (DEMADEX) 20 MG Tab Take 2 tablets (40 mg total) by mouth 3 (three) times a week.     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     Patient is currently retired   Substance and Sexual Activity    Alcohol use: No    Drug use: No    Sexual activity: Yes     Partners: Female     Review of Systems   Constitutional: Negative for chills and fever.   HENT:  Negative for congestion.    Eyes:  Negative for blurred vision and visual disturbance.   Cardiovascular:  Positive for chest pain. Negative for dyspnea on exertion, irregular heartbeat, leg swelling, near-syncope, orthopnea, palpitations, paroxysmal nocturnal dyspnea and syncope.   Respiratory:  Positive for shortness of breath. Negative for cough, sputum production and wheezing.    Skin:  Negative for color change.   Musculoskeletal:  Negative for arthritis and muscle weakness.   Gastrointestinal:  Negative for bloating, abdominal pain, change in bowel habit, nausea and vomiting.   Genitourinary:  Negative for dysuria.   Neurological:  Negative for dizziness and light-headedness.   Psychiatric/Behavioral:  The patient is not nervous/anxious.      Objective:     Vital Signs (Most Recent):  Temp: 98.2 °F (36.8 °C) (07/22/25 0412)  Pulse: 75 (07/22/25 0412)  Resp: 18 (07/22/25 0412)  BP: (!) 167/70 (07/22/25 0412)  SpO2: 96 % (07/22/25 0412) Vital Signs (24h Range):  Temp:  [97.4 °F (36.3 °C)-98.8 °F (37.1 °C)] 98.2 °F (36.8 °C)  Pulse:  [] 75  Resp:  [16-36] 18  SpO2:  [80 %-100 %] 96 %  BP: (115-205)/() 167/70     Weight: 94.5 kg (208 lb 5.4  oz)  Body mass index is 27.49 kg/m².    SpO2: 96 %       No intake or output data in the 24 hours ending 07/22/25 0506    Lines/Drains/Airways       Peripheral Intravenous Line  Duration                  Hemodialysis AV Fistula Left upper arm -- days         Hemodialysis AV Fistula 02/19/24 0924 Left upper arm 518 days    Peripheral IV 07/21/25 0830 20 G Anterior;Right Forearm <1 day                     Physical Exam  Constitutional:       General: He is not in acute distress.     Appearance: Normal appearance. He is not ill-appearing.   HENT:      Head: Normocephalic and atraumatic.      Mouth/Throat:      Mouth: Mucous membranes are moist.   Eyes:      Extraocular Movements: Extraocular movements intact.      Pupils: Pupils are equal, round, and reactive to light.   Cardiovascular:      Rate and Rhythm: Normal rate and regular rhythm.      Heart sounds: No murmur heard.     No friction rub. No gallop.   Pulmonary:      Effort: No respiratory distress.      Breath sounds: No rales.   Abdominal:      General: Bowel sounds are normal. There is no distension.      Tenderness: There is no abdominal tenderness.   Musculoskeletal:      Cervical back: No rigidity.      Right lower leg: No edema.      Left lower leg: No edema.   Skin:     General: Skin is warm.      Capillary Refill: Capillary refill takes less than 2 seconds.   Neurological:      General: No focal deficit present.      Mental Status: He is alert and oriented to person, place, and time.   Psychiatric:         Mood and Affect: Mood normal.         Behavior: Behavior normal.          Significant Labs: BMP:   Recent Labs   Lab 07/21/25  0856 07/21/25  1923   GLU 74  --      --    K 4.7  --      --    CO2 24  --    BUN 35*  --    CREATININE 10.1*  --    CALCIUM 8.4*  --    MG  --  2.0   , CMP   Recent Labs   Lab 07/21/25  0856      K 4.7      CO2 24   GLU 74   BUN 35*   CREATININE 10.1*   CALCIUM 8.4*   PROT 7.1   ALBUMIN 3.1*   BILITOT  "0.3   ALKPHOS 93   AST 24   ALT 16   ANIONGAP 11   , CBC   Recent Labs   Lab 07/21/25  0904 07/21/25  0917   WBC 8.33  --    HGB 11.2*  --    HCT 36.0* 34.2     --    , INR No results for input(s): "INR", "PROTIME" in the last 48 hours., Lipid Panel No results for input(s): "CHOL", "HDL", "LDLCALC", "TRIG", "CHOLHDL" in the last 48 hours., and Troponin   Recent Labs   Lab 07/21/25  1149 07/21/25  1526 07/21/25  1923   TROPONINIHS 64* 54* 62*       Significant Imaging: Echocardiogram: 2D echo with color flow doppler: No results found. However, due to the size of the patient record, not all encounters were searched. Please check Results Review for a complete set of results. and Transthoracic echo (TTE) complete (Cupid Only):   Results for orders placed or performed during the hospital encounter of 07/21/25   Echo   Result Value Ref Range    LV Diastolic Volume 96 mL    Echo EF Estimated 47 %    LV Systolic Volume 51 mL    IVS 1.1 0.6 - 1.1 cm    LVIDd 4.6 3.5 - 6.0 cm    LVIDs 3.5 2.1 - 4.0 cm    LVOT diameter 2.1 cm    PW 0.8 0.6 - 1.1 cm    IVRT 106 ms    AV LVOT peak gradient 3 mmHg    LVOT mn grad 2 mmHg    LVOT peak aris 0.8 m/s    LVOT peak VTI 18.3 cm    RV- laughlin basal diam 2.9 cm    RV S' 12.05 cm/s    LA size 3.8 cm    Left Atrium Major Axis 4.9 cm    Left Atrium Minor Axis 5.2 cm    LA Vol (MOD) 50 mL    RA Major Axis 4.95 cm    RA Area 15.0 cm2    AV valve area 1.1 cm2    AV area by cont VTI 1.1 cm2    AV peak gradient 25 mmHg    AV mean gradient 14 mmHg    Ao peak aris 2.5 m/s    Ao VTI 56.5 cm    MV Peak A Aris 0.60 m/s    E wave deceleration time 259 ms    MV Peak E Aris 0.69 m/s    E/A ratio 1.15     LV LATERAL E/E' RATIO 7.7     LV SEPTAL E/E' RATIO 13.8     TDI LATERAL 0.09 m/s    TDI SEPTAL 0.05 m/s    TV peak gradient 41 mmHg    TR Max Aris 3.2 m/s    Ascending aorta 3.3 cm    STJ 2.9 cm    P vein A 0.2 m/s    MV "A" wave duration 194.10 ms    Pulm vein "A" wave 194.10 ms    PV Peak D Aris 0.28 m/s "    PV Peak S Aris 0.40 m/s    IVC diameter 0.95 cm    Sinus 3.1 cm    RA Width 3.64 cm    TAPSE 1.6 cm    LA WIDTH 3.8 cm    BSA 2.17 m2    [facility] CV CPX PATIENT HEIGHT IN 70.866     LVOT stroke volume 63.4 cm3    LV Systolic Volume Index 23.8 mL/m2    LV Diastolic Volume Index 44.86 mL/m2    LVOT area 3.5 cm2    FS 23.9 (A) 28 - 44 %    Left Ventricle Relative Wall Thickness 0.35 cm    LV mass 148.1 g    LV Mass Index 69.2 g/m2    E/E' ratio 10 m/s    CHRISTOFER 29 mL/m2    LA Vol 62 cm3    Pulm vein S/D ratio 1.43     RV/LV Ratio 0.63 cm    CHRISTOFER (MOD) 23 mL/m2    AV Velocity Ratio 0.32     AV index (prosthetic) 0.32     VERONICA by Velocity Ratio 1.1 cm²    ASI 1.4 cm/m2    Aortic Height Index (SOV) 1.7 cm/m    ASI 1.5 cm/m2    Aortic Height Index 1.8 cm/m    Mean e' 0.07 m/s    ZLVIDS -1.43     ZLVIDD -4.03     TV resting pulmonary artery pressure 44 mmHg    RV TB RVSP 6 mmHg    Est. RA pres 3 mmHg    Narrative      Left Ventricle: The left ventricle is normal in size. Ventricular mass   is normal. Normal wall thickness. Normal wall motion. There is normal   systolic function with a visually estimated ejection fraction of 55 - 60%.   There is indeterminate diastolic function.    Right Ventricle: The right ventricle is normal in size measuring 2.9   cmWall thickness is normal. . Systolic function is normal.    Aortic Valve: There is moderate aortic valve sclerosis. Mildly   restricted motion. There is moderate stenosis. Aortic valve area by VTI is   1.1 cm2. Aortic valve peak velocity is 2.5 m/s. Mean gradient is 14 mmHg.   The dimensionless index is 0.32. There is mild aortic regurgitation.    Tricuspid Valve: There is mild regurgitation.    Pulmonary Artery: There is pulmonary hypertension. The estimated   pulmonary artery systolic pressure is 44 mmHg.    IVC/SVC: Normal venous pressure at 3 mmHg.

## 2025-07-22 NOTE — ASSESSMENT & PLAN NOTE
Patient with known CAD s/p stent placement, which is controlled Will continue ASA, Plavix, and Statin and monitor for S/Sx of angina/ACS. Continue to monitor on telemetry.     - trop 56>64> 54> 62, trending  - ECG w/ NSR, nonischemic  - echo with EF 55-60% with indeterminate diastolic dysfunction  - Cardiology consulted- start heparin gtt, NPO  - IC consulted- plan for LHC  - continue tele  - prn SL NTG

## 2025-07-22 NOTE — SUBJECTIVE & OBJECTIVE
Interval History: Patient seen at bedside this morning on iHD. He is tolerating well. Reports improvement in respiratory symptoms. Denies any acute concerns at this time.     Review of patient's allergies indicates:   Allergen Reactions    Ace inhibitors Swelling    Iodine Itching    Verapamil Other (See Comments)     Other reaction(s): Unknown    Povidone-iodine Itching     Current Facility-Administered Medications   Medication Frequency    [START ON 7/23/2025] 0.9% NaCl infusion PRN    [START ON 7/23/2025] 0.9% NaCl infusion Once    acetaminophen tablet 650 mg Q8H PRN    albuterol-ipratropium 2.5 mg-0.5 mg/3 mL nebulizer solution 3 mL Q4H WAKE    albuterol-ipratropium 2.5 mg-0.5 mg/3 mL nebulizer solution 3 mL Q4H PRN    aluminum & magnesium hydroxide-simethicone 400-400-40 mg/5 mL suspension 30 mL Q6H PRN    amiodarone tablet 200 mg Daily    apixaban tablet 5 mg BID    atorvastatin tablet 80 mg Daily    clopidogreL tablet 75 mg Daily    famotidine tablet 20 mg Daily    fluticasone furoate-vilanteroL 100-25 mcg/dose diskus inhaler 1 puff Daily    gabapentin capsule 300 mg QHS    hydrALAZINE tablet 25 mg Q8H PRN    melatonin tablet 6 mg Nightly PRN    midodrine tablet 5 mg TID PRN    nitroGLYCERIN SL tablet 0.4 mg Q5 Min PRN    ondansetron disintegrating tablet 8 mg Q8H PRN    predniSONE tablet 40 mg Daily    prochlorperazine injection Soln 5 mg Q6H PRN    sevelamer carbonate tablet 800 mg Daily with dinner    sodium chloride 0.9% flush 3 mL PRN    torsemide tablet 40 mg Once per day on Monday Wednesday Friday       Objective:     Vital Signs (Most Recent):  Temp: 97.8 °F (36.6 °C) (07/22/25 0719)  Pulse: 64 (07/22/25 0815)  Resp: 20 (07/22/25 0719)  BP: (!) 116/58 (07/22/25 0815)  SpO2: 96 % (07/22/25 0719) Vital Signs (24h Range):  Temp:  [97.4 °F (36.3 °C)-98.8 °F (37.1 °C)] 97.8 °F (36.6 °C)  Pulse:  [] 64  Resp:  [16-36] 20  SpO2:  [80 %-100 %] 96 %  BP: (115-205)/() 116/58     Weight: 94.5 kg (208  lb 5.4 oz) (07/21/25 2202)  Body mass index is 27.49 kg/m².  Body surface area is 2.21 meters squared.    No intake/output data recorded.     Physical Exam     Significant Labs:  All labs within the past 24 hours have been reviewed.     Significant Imaging:  Labs: Reviewed

## 2025-07-22 NOTE — HPI
Mr Phelps is a 69yr old male with history of CAD (s/p PCI to?LAD 2006, prox to mid RCA and mRCA 2024), pAfib (on amio and eliquis), HFpEF (EF 55-60%), ESRD (on TTS HD via LUE AVF) s/p failed Kidney Transplant (1992 and 2005) on chronic immunosuppression and active smoking. Patient presented yesterday for a routine outpatient echocardiogram, on returning home noted worsening SOB and substernal chest pain prompting ED visit.    In the ED, vitals with /125, RR 22, O2 sat 97% on RA. EKG with sinus tachycardia with AVB (1st) with incomplete LBBB, no acute ST changes. Labs with hs-trop 64--54--62, BNP >300 (prev higher values).   Of note, patient with a positive PET earlier this month with a small basal-mid inferior and inferoseptal reversible perfusion abnormality involving 10% of LV myocardium in the distribution of the proximal RCA territory, pattern suggestive of a  of the RCA with insufficient collateralization. Additional area of inferoapical reversible perfusion abnormality involving 3% of LV myocardium in the distribution of the distal LAD territory.     Interventional Cardiology consulted for recommendations.    Patient on evaluation undergoing HD, denies SOB, chest pain. Vitals stable.

## 2025-07-22 NOTE — PROGRESS NOTES
Pharmacist Renal Dose Adjustment Note    Ibrahima Phelps is a 69 y.o. male being treated with the medication famotidine.    Patient Data:    Vital Signs (Most Recent):  Temp: 97.8 °F (36.6 °C) (07/22/25 0719)  Pulse: 68 (07/22/25 0719)  Resp: 20 (07/22/25 0719)  BP: 133/60 (07/22/25 0719)  SpO2: 96 % (07/22/25 0719) Vital Signs (72h Range):  Temp:  [97.4 °F (36.3 °C)-98.8 °F (37.1 °C)]   Pulse:  []   Resp:  [16-36]   BP: (115-205)/()   SpO2:  [80 %-100 %]      Recent Labs   Lab 07/21/25  0856 07/22/25  0444   CREATININE 10.1* 12.2*     Serum creatinine: 12.2 mg/dL (H) 07/22/25 0444  Estimated creatinine clearance: 6.4 mL/min (A)    Famotidine 40 mg daily will be changed to 20 mg.    Pharmacist's Name: Ruy Zamudio  Pharmacist's Extension: [phone]

## 2025-07-22 NOTE — ASSESSMENT & PLAN NOTE
Patient has paroxysmal (<7 days) atrial fibrillation. Patient is currently in sinus rhythm. [id number] Score: 2. The patients heart rate in the last 24 hours is as follows:  Pulse  Min: 61  Max: 112     Antiarrhythmics  amiodarone tablet 200 mg, Daily, Oral    Anticoagulants  heparin 25,000 units in dextrose 5% (100 units/ml) IV bolus from bag LOW INTENSITY nomogram - [facility], Once, Intravenous  heparin 25,000 units in dextrose 5% 250 mL (100 units/mL) infusion LOW INTENSITY nomogram - [facility], Continuous, Intravenous  heparin 25,000 units in dextrose 5% (100 units/ml) IV bolus from bag LOW INTENSITY nomogram - [facility], As needed (PRN), Intravenous  heparin 25,000 units in dextrose 5% (100 units/ml) IV bolus from bag LOW INTENSITY nomogram - [facility], As needed (PRN), Intravenous    Plan  - Replete lytes with a goal of K>4, Mg >2  - Patient is anticoagulated, see medications listed above.  - Patient's afib is currently controlled

## 2025-07-22 NOTE — SUBJECTIVE & OBJECTIVE
"Past Medical History:   Diagnosis Date    Atrial fibrillation     CAD (coronary artery disease) 2006    MI in 2006    CHF (congestive heart failure) 2017    CKD (chronic kidney disease) stage 3, GFR 30-59 ml/min     Dr. Latif.  in transplanted kidney    CKD (chronic kidney disease) stage 5, GFR less than 15 ml/min 02/02/2024    COVID-19 02/07/2023    Diverticulosis     Hyperlipidemia     Hypertension     Keloid cicatrix     Metabolic bone disease     Other emphysema 10/01/2019    Pericarditis     S/P kidney transplant 1992    x2 (1992 & 2005),    Thrombocytopenia     Thyroid disease     Tobacco use 09/05/2014       Past Surgical History:   Procedure Laterality Date    AV FISTULA PLACEMENT Left 12/18/2023    Procedure: CREATION, AV FISTULA;  Surgeon: JUANI Melendez III, MD;  [facility];  Service: Vascular;  Laterality: Left;  LUE AVF creation vs AVG insertion    CARDIOVERSION  04/30/15    CHOLECYSTECTOMY      COLONOSCOPY  April 20, 2011    Diverticulosis, repeat recommended in 3 yrs., repeat colonoscopy 2014 revealed 2 polyps.  He should return in 5 years.    COLONOSCOPY N/A 3/13/2020    Procedure: COLONOSCOPY;  Surgeon: Chon Casper MD;  Location: [facility] (4TH FLR);  Service: Endoscopy;  Laterality: N/A;  ok to hold coumadin x5days-see telephone encounter 2/4/20-tb    COLONOSCOPY N/A 2/4/2021    Procedure: COLONOSCOPY;  Surgeon: Chon Casper MD;  Location: [facility] (4TH FLR);  Service: Endoscopy;  Laterality: N/A;  Eliquis - per Dr. GAVIN Blunt ok to hold x 2 days and "restarted asap"- ERW  last prep "poor", [id number] ordered/ Pt requests Dr. Casper  prep ins. mailed - COVID screening on 2/1/21 PCW- ERW    COLONOSCOPY N/A 3/3/2021    Procedure: COLONOSCOPY;  Surgeon: Chon Casper MD;  Location: [facility] (4TH FLR);  Service: Endoscopy;  Laterality: N/A;  Patient with his second poor bowel pre and has poor prep today.  If patient not intersted or can't get colonoscopy tomorrow will " need constipation bowel prep and will need to restart his Eliquis today.Thanks,Chon     per Dr Blunt-ok to hold Eliquis 2 days prior      COVID     COLONOSCOPY N/A 12/6/2024    Procedure: COLONOSCOPY;  Surgeon: Chon Casper MD;  Location: [facility] (2ND FLR);  Service: Endoscopy;  Laterality: N/A;  Plavix-Eliquis pending/ HD T-Th-Sat- lab ordered  2/25/24 ECHO PA 49  ref by / [facility] portal-RN  11/29-message to clearance nurse for plavix and eliquis hold-tb  ok to hold Eliquis 2 days per Dr Parmar  ok to hold Plavix 5 days per MEENA COLVINGT  12/2- p    CORONARY ANGIOGRAPHY N/A 2/28/2024    Procedure: ANGIOGRAM, CORONARY ARTERY;  Surgeon: Tylor Lincoln MD;  Location: [facility] CATH LAB;  Service: Cardiology;  Laterality: N/A;    CORONARY ANGIOPLASTY WITH STENT PLACEMENT  9/13/2006    FISTULOGRAM N/A 2/19/2024    Procedure: Fistulogram;  Surgeon: JUANI Melendez III, MD;  Location: [facility] CATH LAB;  Service: Peripheral Vascular;  Laterality: N/A;    FISTULOGRAM, WITH PTA Left 6/3/2024    Procedure: FISTULOGRAM, WITH PTA;  Surgeon: JUANI Melendez III, MD;  [facility];  Service: Vascular;  Laterality: Left;  mGy: 51.58  GyCm2: 6.8285  Fluoro time: 5.1 min  Contrast: 28 cc    FISTULOGRAM, WITH PTA Left 3/5/2025    Procedure: FISTULOGRAM, WITH PTA;  Surgeon: JUANI Melendez III, MD;  [facility];  Service: Vascular;  Laterality: Left;    IRRIGATION AND DEBRIDEMENT Right 8/30/2023    Procedure: IRRIGATION AND DEBRIDEMENT, RIGHT MIDDLE FINGER;  Surgeon: Martin Larsen MD;  [facility];  Service: Orthopedics;  Laterality: Right;    KIDNEY TRANSPLANT  2005    LEFT HEART CATHETERIZATION N/A 2/26/2024    Procedure: Left heart cath;  Surgeon: Tylor Lincoln MD;  Location: [facility] CATH LAB;  Service: Cardiology;  Laterality: N/A;    PARATHYROIDECTOMY      PERCUTANEOUS TRANSLUMINAL ANGIOPLASTY OF ARTERIOVENOUS FISTULA Left 2/19/2024    Procedure: PTA,  AV FISTULA;  Surgeon: JUANI Melendez III, MD;  Location: [facility] CATH LAB;  Service: Peripheral Vascular;  Laterality: Left;    STENT, DRUG ELUTING, SINGLE VESSEL, CORONARY N/A 2/28/2024    Procedure: Stent, Drug Eluting, Single Vessel, Coronary;  Surgeon: Tylor Lincoln MD;  Location: [facility] CATH LAB;  Service: Cardiology;  Laterality: N/A;    STENT, PERIPHERAL GRAFT Left 3/5/2025    Procedure: STENT, PERIPHERAL GRAFT;  Surgeon: JUANI Melendez III, MD;  [facility];  Service: Vascular;  Laterality: Left;  mGy 63.17  Gycm2 8.3386  flouro 63.17 min  contrast 38 ml    TREATMENT OF CARDIAC ARRHYTHMIA N/A 3/28/2022    Procedure: CARDIOVERSION;  Surgeon: Migue Blunt MD;  Location: [facility] EP LAB;  Service: Cardiology;  Laterality: N/A;  AF, DCC, MAC, GP, 3 PREP*RODRIGO deferred pt 100% compliant*    VENOPLASTY  6/3/2024    Procedure: ANGIOPLASTY, VEIN;  Surgeon: JUANI Melendez III, MD;  [facility];  Service: Vascular;;  Left subclavian       Review of patient's allergies indicates:   Allergen Reactions    Ace inhibitors Swelling    Iodine Itching    Verapamil Other (See Comments)     Other reaction(s): Unknown    Povidone-iodine Itching       PTA Medications   Medication Sig    acetaminophen (TYLENOL) 500 MG tablet Take 1 tablet (500 mg total) by mouth every 6 (six) hours as needed for Pain.    albuterol (VENTOLIN HFA) 90 mcg/actuation inhaler Inhale 2 puffs into the lungs every 6 (six) hours as needed for Wheezing or Shortness of Breath. Rescue    albuterol-ipratropium (DUO-NEB) 2.5 mg-0.5 mg/3 mL nebulizer solution Take 3 mLs by nebulization every 4 (four) hours as needed for Wheezing or Shortness of Breath. Rescue    amiodarone (PACERONE) 200 MG Tab TAKE 1 TABLET BY MOUTH EVERY DAY    atorvastatin (LIPITOR) 80 MG tablet Take 1 tablet (80 mg total) by mouth once daily.    b complex vitamins (B COMPLEX-VITAMIN B12) tablet Take 1 tablet by mouth once daily.    clopidogreL (PLAVIX) 75 mg  tablet Take 1 tablet (75 mg total) by mouth once daily.    ELIQUIS 5 mg Tab TAKE 1 TABLET BY MOUTH TWICE A DAY    famotidine (PEPCID) 20 MG tablet Take 1 tablet (20 mg total) by mouth once daily. On dialysis days, please take this medicine after dialysis. Only taking on Tue, Thur, and  Sat.    fexofenadine HCl (ALLEGRA ORAL) Take 1 tablet by mouth daily as needed (allergies).    fluticasone furoate-vilanteroL (BREO) 100-25 mcg/dose diskus inhaler Inhale 1 puff into the lungs once daily. Controller. Use this inhaler every day.    fluticasone propionate (FLONASE) 50 mcg/actuation nasal spray 1 spray (50 mcg total) by Each Nostril route daily as needed for Allergies.    gabapentin (NEURONTIN) 300 MG capsule Take 1 capsule (300 mg total) by mouth every evening.    ipratropium (ATROVENT) 21 mcg (0.03 %) nasal spray 2 sprays by Each Nostril route 3 (three) times daily.    LIDOcaine (LIDODERM) 5 % Place 1 patch onto the skin once daily. Remove & Discard patch within 12 hours or as directed by MD    midodrine (PROAMATINE) 5 MG Tab Take 1 tablet (5 mg total) by mouth 3 (three) times daily as needed (if blood pressure less than 100/60 mmHg).    multivit-min-FA-lycopen-lutein (CENTRUM SILVER) 0.4 mg-300 mcg- 250 mcg Tab Take 1 tablet by mouth once daily.    nitroGLYCERIN (NITROSTAT) 0.4 MG SL tablet Place 1 tablet (0.4 mg total) under the tongue every 5 (five) minutes as needed for Chest pain.    omeprazole (PRILOSEC) 20 MG capsule Take 1 capsule (20 mg total) by mouth daily as needed (heartburn).    sevelamer carbonate (RENVELA) 800 mg Tab This medicine is used to treat your phosphorus level. Please take 1 tablet (800 mg) once a day with the largest meal of the day.    torsemide (DEMADEX) 20 MG Tab Take 2 tablets (40 mg total) by mouth 3 (three) times a week.     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     Patient is currently retired   Substance and Sexual Activity    Alcohol use: No    Drug use: No    Sexual activity: Yes     Partners: Female     Review of Systems   Cardiovascular:  Negative for chest pain, near-syncope, orthopnea, palpitations and paroxysmal nocturnal dyspnea.   Respiratory:  Negative for cough and shortness of breath.    All other systems reviewed and are negative.    Objective:     Vital Signs (Most Recent):  Temp: 98.2 °F (36.8 °C) (07/22/25 0730)  Pulse: 66 (07/22/25 1030)  Resp: 18 (07/22/25 0730)  BP: (!) 107/55 (07/22/25 1030)  SpO2: 96 % (07/22/25 0719) Vital Signs (24h Range):  Temp:  [97.4 °F (36.3 °C)-98.8 °F (37.1 °C)] 98.2 °F (36.8 °C)  Pulse:  [] 66  Resp:  [16-36] 18  SpO2:  [80 %-100 %] 96 %  BP: (107-205)/() 107/55     Weight: 94.5 kg (208 lb 5.4 oz)  Body mass index is 27.49 kg/m².    SpO2: 96 %       No intake or output data in the 24 hours ending 07/22/25 1039    Lines/Drains/Airways       Peripheral Intravenous Line  Duration                  Hemodialysis AV Fistula 02/19/24 0924 Left upper arm 519 days    Peripheral IV 07/21/25 0830 20 G Anterior;Right Forearm 1 day                     Physical Exam  Constitutional:       Appearance: Normal appearance.   HENT:      Head: Normocephalic and atraumatic.      Nose: Nose normal.      Mouth/Throat:      Mouth: Mucous membranes are moist.   Eyes:      Conjunctiva/sclera: Conjunctivae normal.      Pupils: Pupils are equal, round, and reactive to light.   Cardiovascular:      Rate and Rhythm:  Normal rate and regular rhythm.      Heart sounds: No murmur heard.  Pulmonary:      Effort: Pulmonary effort is normal. No respiratory distress.      Breath sounds: Normal breath sounds. No wheezing or rales.   Abdominal:      General: Bowel sounds are normal.      Palpations: Abdomen is soft.   Musculoskeletal:      Cervical back: Normal range of motion.      Right lower leg: No edema.      Left lower leg: No edema.   Skin:     Capillary Refill: Capillary refill takes less than 2 seconds.   Neurological:      General: No focal deficit present.      Mental Status: He is alert and oriented to person, place, and time. Mental status is at baseline.   Psychiatric:         Mood and Affect: Mood normal.         Thought Content: Thought content normal.            Significant Labs:   Recent Lab Results  (Last 5 results in the past 24 hours)        07/22/25  0744   07/22/25  0444   07/21/25 2003 07/21/25  1923   07/21/25  1806        Anion Gap   15             Baso #   0.01             Basophil %   0.1             BUN   51             Calcium   8.2             Chloride   98             CO2   19             Creatinine   12.2  Comment: Action limit exceeded. Possible specimen integrity issue - Investigate             eGFR   4  Comment: Estimated GFR calculated using the CKD-EPI creatinine (2021) equation.             Eos #   0.02             Eos %   0.3             Glucose   112             Gran # (ANC)   6.68             Hematocrit   32.5             Hemoglobin   10.3             Hepatitis B Surface Ag Non-Reactive               Immature Grans (Abs)   0.04  Comment: Mild elevation in immature granulocytes is non specific and can be seen in a variety of conditions including stress response, acute inflammation, trauma and pregnancy. Correlation with other laboratory and clinical findings is essential.             Immature Granulocytes   0.5             Lymph #   0.61             Lymph %   8.2             Magnesium    2.2      2.0         MCH   26.9             MCHC   31.7             MCV   85             Mono #   0.09             Mono %   1.2             MPV   11.5             Neut %   89.7             nRBC   0             QRS Duration     110     102       OHS QTC Calculation     465     482       Phosphorus Level   4.9             Platelet Count   144             Potassium   5.7  Comment: *No Visible Hemolysis             RBC   3.83             RDW   17.0             Sodium   132             Troponin I High Sensitivity       62         WBC   7.45                                    Significant Imaging:   Echo  Result Date: 7/21/2025    Left Ventricle: The left ventricle is normal in size. Ventricular mass   is normal. Normal wall thickness. Normal wall motion. There is normal   systolic function with a visually estimated ejection fraction of 55 - 60%.   There is indeterminate diastolic function.    Right Ventricle: The right ventricle is normal in size measuring 2.9   cmWall thickness is normal. . Systolic function is normal.    Aortic Valve: There is moderate aortic valve sclerosis. Mildly   restricted motion. There is moderate stenosis. Aortic valve area by VTI is   1.1 cm2. Aortic valve peak velocity is 2.5 m/s. Mean gradient is 14 mmHg.   The dimensionless index is 0.32. There is mild aortic regurgitation.    Tricuspid Valve: There is mild regurgitation.    Pulmonary Artery: There is pulmonary hypertension. The estimated   pulmonary artery systolic pressure is 44 mmHg.    IVC/SVC: Normal venous pressure at 3 mmHg.

## 2025-07-23 VITALS
HEIGHT: 73 IN | BODY MASS INDEX: 27.61 KG/M2 | SYSTOLIC BLOOD PRESSURE: 149 MMHG | HEART RATE: 60 BPM | TEMPERATURE: 98 F | OXYGEN SATURATION: 100 % | WEIGHT: 208.31 LBS | RESPIRATION RATE: 18 BRPM | DIASTOLIC BLOOD PRESSURE: 68 MMHG

## 2025-07-23 LAB
ABSOLUTE EOSINOPHIL (OHS): 0 K/UL
ABSOLUTE MONOCYTE (OHS): 0.25 K/UL (ref 0.3–1)
ABSOLUTE NEUTROPHIL COUNT (OHS): 11.22 K/UL (ref 1.8–7.7)
ANION GAP (OHS): 13 MMOL/L (ref 8–16)
BASOPHILS # BLD AUTO: 0.01 K/UL
BASOPHILS NFR BLD AUTO: 0.1 %
BUN SERPL-MCNC: 41 MG/DL (ref 8–23)
CALCIUM SERPL-MCNC: 7.6 MG/DL (ref 8.7–10.5)
CHLORIDE SERPL-SCNC: 99 MMOL/L (ref 95–110)
CO2 SERPL-SCNC: 22 MMOL/L (ref 23–29)
CREAT SERPL-MCNC: 8.1 MG/DL (ref 0.5–1.4)
ERYTHROCYTE [DISTWIDTH] IN BLOOD BY AUTOMATED COUNT: 17.2 % (ref 11.5–14.5)
GFR SERPLBLD CREATININE-BSD FMLA CKD-EPI: 7 ML/MIN/1.73/M2
GLUCOSE SERPL-MCNC: 134 MG/DL (ref 70–110)
HCT VFR BLD AUTO: 32.7 % (ref 40–54)
HGB BLD-MCNC: 10.6 GM/DL (ref 14–18)
IMM GRANULOCYTES # BLD AUTO: 0.05 K/UL (ref 0–0.04)
IMM GRANULOCYTES NFR BLD AUTO: 0.4 % (ref 0–0.5)
LYMPHOCYTES # BLD AUTO: 0.54 K/UL (ref 1–4.8)
MAGNESIUM SERPL-MCNC: 2.3 MG/DL (ref 1.6–2.6)
MCH RBC QN AUTO: 27.2 PG (ref 27–31)
MCHC RBC AUTO-ENTMCNC: 32.4 G/DL (ref 32–36)
MCV RBC AUTO: 84 FL (ref 82–98)
NUCLEATED RBC (/100WBC) (OHS): 0 /100 WBC
PHOSPHATE SERPL-MCNC: 4.8 MG/DL (ref 2.7–4.5)
PLATELET # BLD AUTO: 155 K/UL (ref 150–450)
PMV BLD AUTO: 11.7 FL (ref 9.2–12.9)
POTASSIUM SERPL-SCNC: 5.6 MMOL/L (ref 3.5–5.1)
RBC # BLD AUTO: 3.9 M/UL (ref 4.6–6.2)
RELATIVE EOSINOPHIL (OHS): 0 %
RELATIVE LYMPHOCYTE (OHS): 4.5 % (ref 18–48)
RELATIVE MONOCYTE (OHS): 2.1 % (ref 4–15)
RELATIVE NEUTROPHIL (OHS): 92.9 % (ref 38–73)
SODIUM SERPL-SCNC: 134 MMOL/L (ref 136–145)
WBC # BLD AUTO: 12.07 K/UL (ref 3.9–12.7)

## 2025-07-23 PROCEDURE — 83735 ASSAY OF MAGNESIUM: CPT | Mod: HCNC | Performed by: PHYSICIAN ASSISTANT

## 2025-07-23 PROCEDURE — 94799 UNLISTED PULMONARY SVC/PX: CPT | Mod: HCNC

## 2025-07-23 PROCEDURE — 85025 COMPLETE CBC W/AUTO DIFF WBC: CPT | Mod: HCNC | Performed by: PHYSICIAN ASSISTANT

## 2025-07-23 PROCEDURE — 63600175 PHARM REV CODE 636 W HCPCS: Mod: HCNC | Performed by: PHYSICIAN ASSISTANT

## 2025-07-23 PROCEDURE — 94640 AIRWAY INHALATION TREATMENT: CPT | Mod: HCNC

## 2025-07-23 PROCEDURE — 36415 COLL VENOUS BLD VENIPUNCTURE: CPT | Mod: HCNC | Performed by: PHYSICIAN ASSISTANT

## 2025-07-23 PROCEDURE — 25000003 PHARM REV CODE 250: Mod: HCNC

## 2025-07-23 PROCEDURE — 25000242 PHARM REV CODE 250 ALT 637 W/ HCPCS: Mod: HCNC | Performed by: PHYSICIAN ASSISTANT

## 2025-07-23 PROCEDURE — 84100 ASSAY OF PHOSPHORUS: CPT | Mod: HCNC | Performed by: PHYSICIAN ASSISTANT

## 2025-07-23 PROCEDURE — G0257 UNSCHED DIALYSIS ESRD PT HOS: HCPCS | Mod: HCNC

## 2025-07-23 PROCEDURE — 94761 N-INVAS EAR/PLS OXIMETRY MLT: CPT | Mod: HCNC

## 2025-07-23 PROCEDURE — 25000003 PHARM REV CODE 250: Mod: HCNC | Performed by: PHYSICIAN ASSISTANT

## 2025-07-23 PROCEDURE — 25000003 PHARM REV CODE 250: Mod: HCNC | Performed by: STUDENT IN AN ORGANIZED HEALTH CARE EDUCATION/TRAINING PROGRAM

## 2025-07-23 PROCEDURE — 90935 HEMODIALYSIS ONE EVALUATION: CPT | Mod: HCNC

## 2025-07-23 PROCEDURE — 80048 BASIC METABOLIC PNL TOTAL CA: CPT | Mod: HCNC | Performed by: PHYSICIAN ASSISTANT

## 2025-07-23 PROCEDURE — 25000242 PHARM REV CODE 250 ALT 637 W/ HCPCS: Mod: HCNC | Performed by: NURSE PRACTITIONER

## 2025-07-23 PROCEDURE — 25000003 PHARM REV CODE 250: Mod: HCNC | Performed by: INTERNAL MEDICINE

## 2025-07-23 PROCEDURE — 90935 HEMODIALYSIS ONE EVALUATION: CPT | Mod: HCNC,,, | Performed by: INTERNAL MEDICINE

## 2025-07-23 RX ORDER — HEPARIN SODIUM 1000 [USP'U]/ML
1000 INJECTION, SOLUTION INTRAVENOUS; SUBCUTANEOUS
Status: CANCELLED | OUTPATIENT
Start: 2025-07-23 | End: 2025-07-24

## 2025-07-23 RX ORDER — MUPIROCIN 20 MG/G
OINTMENT TOPICAL 2 TIMES DAILY
Status: DISCONTINUED | OUTPATIENT
Start: 2025-07-23 | End: 2025-07-23 | Stop reason: HOSPADM

## 2025-07-23 RX ORDER — PREDNISONE 20 MG/1
40 TABLET ORAL DAILY
Qty: 4 TABLET | Refills: 0 | Status: SHIPPED | OUTPATIENT
Start: 2025-07-24 | End: 2025-07-26

## 2025-07-23 RX ORDER — SODIUM CHLORIDE 9 MG/ML
INJECTION, SOLUTION INTRAVENOUS ONCE
Status: CANCELLED | OUTPATIENT
Start: 2025-07-23 | End: 2025-07-23

## 2025-07-23 RX ORDER — SODIUM CHLORIDE 9 MG/ML
INJECTION, SOLUTION INTRAVENOUS
Status: CANCELLED | OUTPATIENT
Start: 2025-07-23

## 2025-07-23 RX ADMIN — AMIODARONE HYDROCHLORIDE 200 MG: 200 TABLET ORAL at 10:07

## 2025-07-23 RX ADMIN — TORSEMIDE 40 MG: 20 TABLET ORAL at 10:07

## 2025-07-23 RX ADMIN — PREDNISONE 40 MG: 20 TABLET ORAL at 10:07

## 2025-07-23 RX ADMIN — CLOPIDOGREL 75 MG: 75 TABLET ORAL at 10:07

## 2025-07-23 RX ADMIN — APIXABAN 5 MG: 5 TABLET, FILM COATED ORAL at 10:07

## 2025-07-23 RX ADMIN — FLUTICASONE FUROATE AND VILANTEROL TRIFENATATE 1 PUFF: 100; 25 POWDER RESPIRATORY (INHALATION) at 08:07

## 2025-07-23 RX ADMIN — FAMOTIDINE 20 MG: 20 TABLET, FILM COATED ORAL at 10:07

## 2025-07-23 RX ADMIN — ATORVASTATIN CALCIUM 80 MG: 40 TABLET, FILM COATED ORAL at 10:07

## 2025-07-23 RX ADMIN — MUPIROCIN: 20 OINTMENT TOPICAL at 10:07

## 2025-07-23 RX ADMIN — IPRATROPIUM BROMIDE AND ALBUTEROL SULFATE 3 ML: 2.5; .5 SOLUTION RESPIRATORY (INHALATION) at 08:07

## 2025-07-23 NOTE — PROGRESS NOTES
[facility] - Observation 11H  Nephrology  Progress Note    Patient Name: Ibrahima Phelps  MRN: [id number]  Admission Date: 7/21/2025  Hospital Length of Stay: 1 days  Attending Provider: Griselda Phelps MD   Primary Care Physician: Anatoliy Colindres MD  Principal Problem:NSTEMI (non-ST elevated myocardial infarction)    Subjective:     HPI: Mr. Ibrahima Phelps is a 69-year-old male with history of coronary artery disease, MI, hypertension, atrial fibrillation, pericarditis, ESRD, presenting for shortness of breath.  Patient went to the hospital earlier today to undergo outpatient echocardiogram, when he went home to take a nap this morning, he was awoken out of his sleep with severe shortness of breath. He had audible wheezing, used his inhaler with moderate improvement in his symptoms. Reports his inhalers are nearly out.  He does not wear oxygen. He reports 1 week of dry nonproductive cough, moderate shortness of breath on exertion. Also associated burning chest pain. He denies orthopnea. On arrival to the ER here, he reports his shortness of breath has improved but not back to baseline.   He denies fever, chills, nausea, vomiting, decreased oral intake, diarrhea.His last HD session was on 7/19.   In ED, Pt hypertensive 191/125, RR 22, O2 sat 97% on RA. No leukocytosis. No significant electrolyte abnormalities. , trop 56>64. CXR: Heart size normal.  The lungs are clear.  No pleural effusion. ECG w/ NSR, no acute ischemia. Given duonebs and Iv solumedrol. Nephrology consulted for ESRD management.     Interval History: Patient underwent cath yesterday without acute interventions -recommended for continued medical therapy. Patient evaluated at bedside this morning. He reports feeling well and notes improvement in shortness of breath and chest discomfort. Denies any other acute concerns.     Review of patient's allergies indicates:   Allergen Reactions    Ace inhibitors Swelling    Iodine Itching    Verapamil Other  (See Comments)     Other reaction(s): Unknown    Povidone-iodine Itching     Current Facility-Administered Medications   Medication Frequency    0.9% NaCl infusion PRN    0.9% NaCl infusion Once    acetaminophen tablet 650 mg Q8H PRN    albuterol-ipratropium 2.5 mg-0.5 mg/3 mL nebulizer solution 3 mL Q4H PRN    aluminum & magnesium hydroxide-simethicone 400-400-40 mg/5 mL suspension 30 mL Q6H PRN    amiodarone tablet 200 mg Daily    apixaban tablet 5 mg BID    atorvastatin tablet 80 mg Daily    clopidogreL tablet 75 mg Daily    diphenhydrAMINE injection 50 mg Once    famotidine (PF) injection 40 mg Once    famotidine tablet 20 mg Daily    fluticasone furoate-vilanteroL 100-25 mcg/dose diskus inhaler 1 puff Daily    gabapentin capsule 300 mg QHS    melatonin tablet 6 mg Nightly PRN    methylPREDNISolone sodium succinate injection 125 mg Once    midodrine tablet 5 mg TID PRN    mupirocin 2 % ointment BID    nitroGLYCERIN SL tablet 0.4 mg Q5 Min PRN    ondansetron disintegrating tablet 8 mg Q8H PRN    predniSONE tablet 40 mg Daily    prochlorperazine injection Soln 5 mg Q6H PRN    sevelamer carbonate tablet 800 mg Daily with dinner    sodium chloride 0.9% flush 10 mL PRN    sodium chloride 0.9% flush 3 mL PRN    torsemide tablet 40 mg Once per day on Monday Wednesday Friday       Objective:     Vital Signs (Most Recent):  Temp: 98.2 °F (36.8 °C) (07/23/25 1125)  Pulse: 69 (07/23/25 1125)  Resp: 20 (07/23/25 1125)  BP: 130/61 (07/23/25 1125)  SpO2: 95 % (07/23/25 1125) Vital Signs (24h Range):  Temp:  [97 °F (36.1 °C)-98.4 °F (36.9 °C)] 98.2 °F (36.8 °C)  Pulse:  [53-74] 69  Resp:  [14-20] 20  SpO2:  [92 %-99 %] 95 %  BP: (109-151)/(50-79) 130/61     Weight: 94.5 kg (208 lb 5.4 oz) (07/21/25 2202)  Body mass index is 27.49 kg/m².  Body surface area is 2.21 meters squared.    I/O last 3 completed shifts:  In: 650 [Other:650]  Out: 3150 [Other:3150]     Physical Exam  Vitals and nursing note reviewed.   Constitutional:        Appearance: He is not ill-appearing.   Eyes:      General: No scleral icterus.  Cardiovascular:      Rate and Rhythm: Normal rate.   Pulmonary:      Effort: Pulmonary effort is normal. No respiratory distress.   Musculoskeletal:         General: Deformity: LUE AV fistula.      Right lower leg: No edema.      Left lower leg: No edema.   Neurological:      General: No focal deficit present.      Mental Status: He is alert. Mental status is at baseline.   Psychiatric:         Mood and Affect: Mood normal.          Significant Labs:  All labs within the past 24 hours have been reviewed.     Significant Imaging:  Labs: Reviewed  Assessment/Plan:     Pulmonary  Chronic obstructive pulmonary disease, unspecified  -Management per primary team    Cardiac/Vascular  Chest pain in patient with CAD (coronary artery disease)  -Management per primary and cardiology team    Renal/  ESRD (end stage renal disease)  Nephrology History  -Outpatient HD unit: [facility]  -Nephrologist: Dr Gambino  -HD tx days: TTS  -Last HD tx: 7/22 with 2.5L UF  -HD access: LUE AV fistula  -HD modality: iHD  -Residual urine: +  -EDW:  90 kg     -HD today with goal UF 2L as tolerated for hyperkalemia   -iHD per TTS while admitted unless emergent need arises  -Strict I&O, pre and post-dialysis weight  -Renal diet as tolerated  -Renal profile on dialysis days     H/O kidney transplant  Patient is 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 No longer on immunosuppressants.    Oncology  Anemia in chronic kidney disease  Hb at goal.     -Defer ISAURA to outpatient unit  -Trend Hb for goal 9-11 g/dl    Endocrine  Secondary renal hyperparathyroidism  -Continue home sevelamer  -Trend Ca/phos        Thank you for your consult. I will follow-up with patient. Please contact us if you have any additional questions.    Full attending attestation to follow.     Ruba Quinn MD  Nephrology  PGY-4  [facility] - Observation [facility]

## 2025-07-23 NOTE — PROGRESS NOTES
07/23/25 1425 07/23/25 1427        Hemodialysis AV Fistula 02/19/24 0924 Left upper arm   Placement Date/Time: 02/19/24 0924   Present Prior to Hospital Arrival?: Yes  Location: Left upper arm   Needle Size 15ga  --    Site Assessment No redness;No swelling  --    Patency Present;Thrill;Bruit  --    Status Accessed  --    Flows Good  --    Site Condition No complications  --    During Hemodialysis Assessment   Blood Flow Rate (mL/min)  --  400 mL/min   Dialysate Flow Rate (mL/min)  --  700 ml/min   Ultrafiltration Rate (mL/Hr)  --  760 mL/Hr   Arteriovenous Lines Secure  --  Yes   Arterial Pressure (mmHg)  --  -160 mmHg   Venous Pressure (mmHg)  --  190   TMP  --  20   Venous Line in Air Detector  --  Yes   Intake (mL)  --  250 mL   Intra-Hemodialysis Comments  --  HD started     Patient arrived [facility] by wheelchair. Observation HD started.

## 2025-07-23 NOTE — SUBJECTIVE & OBJECTIVE
Interval History: Patient underwent cath yesterday without acute interventions -recommended for continued medical therapy. Patient evaluated at bedside this morning. He reports feeling well and notes improvement in shortness of breath and chest discomfort. Denies any other acute concerns.     Review of patient's allergies indicates:   Allergen Reactions    Ace inhibitors Swelling    Iodine Itching    Verapamil Other (See Comments)     Other reaction(s): Unknown    Povidone-iodine Itching     Current Facility-Administered Medications   Medication Frequency    0.9% NaCl infusion PRN    0.9% NaCl infusion Once    acetaminophen tablet 650 mg Q8H PRN    albuterol-ipratropium 2.5 mg-0.5 mg/3 mL nebulizer solution 3 mL Q4H PRN    aluminum & magnesium hydroxide-simethicone 400-400-40 mg/5 mL suspension 30 mL Q6H PRN    amiodarone tablet 200 mg Daily    apixaban tablet 5 mg BID    atorvastatin tablet 80 mg Daily    clopidogreL tablet 75 mg Daily    diphenhydrAMINE injection 50 mg Once    famotidine (PF) injection 40 mg Once    famotidine tablet 20 mg Daily    fluticasone furoate-vilanteroL 100-25 mcg/dose diskus inhaler 1 puff Daily    gabapentin capsule 300 mg QHS    melatonin tablet 6 mg Nightly PRN    methylPREDNISolone sodium succinate injection 125 mg Once    midodrine tablet 5 mg TID PRN    mupirocin 2 % ointment BID    nitroGLYCERIN SL tablet 0.4 mg Q5 Min PRN    ondansetron disintegrating tablet 8 mg Q8H PRN    predniSONE tablet 40 mg Daily    prochlorperazine injection Soln 5 mg Q6H PRN    sevelamer carbonate tablet 800 mg Daily with dinner    sodium chloride 0.9% flush 10 mL PRN    sodium chloride 0.9% flush 3 mL PRN    torsemide tablet 40 mg Once per day on Monday Wednesday Friday       Objective:     Vital Signs (Most Recent):  Temp: 98.2 °F (36.8 °C) (07/23/25 1125)  Pulse: 69 (07/23/25 1125)  Resp: 20 (07/23/25 1125)  BP: 130/61 (07/23/25 1125)  SpO2: 95 % (07/23/25 1125) Vital Signs (24h Range):  Temp:  [97 °F  (36.1 °C)-98.4 °F (36.9 °C)] 98.2 °F (36.8 °C)  Pulse:  [53-74] 69  Resp:  [14-20] 20  SpO2:  [92 %-99 %] 95 %  BP: (109-151)/(50-79) 130/61     Weight: 94.5 kg (208 lb 5.4 oz) (07/21/25 2202)  Body mass index is 27.49 kg/m².  Body surface area is 2.21 meters squared.    I/O last 3 completed shifts:  In: 650 [Other:650]  Out: 3150 [Other:3150]     Physical Exam  Vitals and nursing note reviewed.   Constitutional:       Appearance: He is not ill-appearing.   Eyes:      General: No scleral icterus.  Cardiovascular:      Rate and Rhythm: Normal rate.   Pulmonary:      Effort: Pulmonary effort is normal. No respiratory distress.   Musculoskeletal:         General: Deformity: LUE AV fistula.      Right lower leg: No edema.      Left lower leg: No edema.   Neurological:      General: No focal deficit present.      Mental Status: He is alert. Mental status is at baseline.   Psychiatric:         Mood and Affect: Mood normal.          Significant Labs:  All labs within the past 24 hours have been reviewed.     Significant Imaging:  Labs: Reviewed

## 2025-07-23 NOTE — ASSESSMENT & PLAN NOTE
Nephrology History  -Outpatient HD unit: [facility]  -Nephrologist: Dr Gambino  -HD tx days: TTS  -Last HD tx: 7/22 with 2.5L UF  -HD access: LUE AV fistula  -HD modality: iHD  -Residual urine: +  -EDW:  90 kg     -HD today with goal UF 2L as tolerated for hyperkalemia   -iHD per TTS while admitted unless emergent need arises  -Strict I&O, pre and post-dialysis weight  -Renal diet as tolerated  -Renal profile on dialysis days

## 2025-07-23 NOTE — PROGRESS NOTES
07/23/25 1724 07/23/25 1726        Hemodialysis AV Fistula 02/19/24 0924 Left upper arm   Placement Date/Time: 02/19/24 0924   Present Prior to Hospital Arrival?: Yes  Location: Left upper arm   Site Assessment  --  No redness;No swelling   Patency  --  Present;Thrill;Bruit   Status  --  Deaccessed   Dressing Status  --  Clean;Dry;Intact   Site Condition  --  No complications   Dressing  --  Gauze   During Hemodialysis Assessment   Blood Flow Rate (mL/min) 400 mL/min  --    Dialysate Flow Rate (mL/min) 700 ml/min  --    Ultrafiltration Rate (mL/Hr) 760 mL/Hr  --    Arteriovenous Lines Secure Yes  --    Arterial Pressure (mmHg) -160 mmHg  --    Venous Pressure (mmHg) 190  --    Blood Volume Processed (Liters) 66.4 L  --    UF Removed (mL) 2650 mL  --    TMP 20  --    Venous Line in Air Detector Yes  --    Intake (mL) 250 mL  --    Intra-Hemodialysis Comments HD completed. Ok from Dr Michele carvajal changed TX time to 3 hrs.  --    Post-Hemodialysis Assessment   Rinseback Volume (mL) 250 mL  --    Blood Volume Processed (Liters) 66.4 L  --    Dialyzer Clearance Moderately streaked  --    Duration of Treatment 180 minutes  --    Total UF (mL) 2650 mL  --    Net Fluid Removal 2000  --    Patient Response to Treatment Sandeep. well  --    Post-Treatment Weight 90.4 kg (199 lb 4.7 oz)  --    Treatment Weight Change -2.7  --      HD completed. Patient left STEVE by wheelchair in [facility].

## 2025-07-24 NOTE — DISCHARGE SUMMARY
[facility] [address] [place] [address] Medicine  Discharge Summary      Patient Name: Ibrahima Phelps  MRN: [id number]  SHAVONNE: [id number]  Patient Class: IP- Inpatient  Admission Date: 7/21/2025  Hospital Length of Stay: 1 days  Discharge Date and Time: 7/23/2025  7:00 PM  Attending Physician: No att. providers found   Discharging Provider: Jaja Mcknight PA-C  Primary Care Provider: Anatoliy Colindres MD  [facility] Medicine Team: [facility] HOSP MED  Jaja Mcknight PA-C  Primary Care Team: [facility]    HPI:   Mr. Ibrahima Phelps is a 69-year-old male with history of coronary artery disease, MI, hypertension, atrial fibrillation, pericarditis, ESRD, presenting for shortness of breath.  Patient went to the hospital earlier today to undergo outpatient echocardiogram, when he went home to take a nap this morning, he was awoken out of his sleep with severe shortness of breath. He had audible wheezing, used his inhaler with moderate improvement in his symptoms. Reports his inhalers are nearly out.  He does not wear oxygen. He reports 1 week of dry nonproductive cough, moderate shortness of breath on exertion. Also associated burning chest pain. He denies orthopnea. On arrival to the ER here, he reports his shortness of breath has improved but not back to baseline.   He denies fever, chills, nausea, vomiting, decreased oral intake, diarrhea.    In ED, Pt hypertensive 191/125, RR 22, O2 sat 97% on RA. No leukocytosis. No significant electrolyte abnormalities. , trop 56>64. CXR: Heart size normal.  The lungs are clear.  No pleural effusion. ECG w/ NSR, no acute ischemia. Given duonebs and Iv solumedrol.     Procedure(s) (LRB):  ANGIOGRAM, CORONARY ARTERY (N/A)      Hospital Course:   Ibrahima Phelps is a 69 y.o. male admitted to  for COPD. Started on duo nebs and steroids with improvement in SOB. Will complete prednisone course outpatient. Reported 2 episodes of chest pain relieved by nitro. Trop mildly elevated. Cardiology  consulted. Will stop Eliquis- start heparin gtt. IC consulted. LHC performed 7/22 which showed  of RCA distal to stent with collaterals. No intervention noted. Will continue Plavix and Eliquis outpatient per Cards recs. Hyperkalemia noted 7/23. Nephrology notified. Will complete another session of HD and will continue with HD T/TH/Sat outpatient. Patient seen on day of discharge and is medically ready for discharge. All questions answered at bedside. Patient agreeable to the plan.     Physical Exam  Gen: in NAD, appears stated age  Neuro: AAOx4  CVS: RRR, no m/r/g  Resp: lungs CTAB, no w/r/r; no belabored breathing or accessory muscle use appreciated          Goals of Care Treatment Preferences:  Code Status: Full Code    Health care agent: Pt's wife is CRISTY  Health care agent number: No value filed.                      Consults:   Consults (From admission, onward)          Status Ordering Provider     Inpatient consult to Interventional Cardiology  Once        Provider:  (Not yet assigned)    Completed NEPTALI GUNTER     Inpatient consult to Cardiology  Once        Provider:  (Not yet assigned)    Completed ROSIE DC     Inpatient consult to Nephrology  Once        Provider:  (Not yet assigned)    Completed ROSIE DC            Assessment & Plan    Final Active Diagnoses:    Diagnosis Date Noted POA    PRINCIPAL PROBLEM:  NSTEMI (non-ST elevated myocardial infarction) [I21.4] 11/24/2023 Yes    Kidney transplant as cause of abnormal reaction or later complication [T86.10] 07/22/2025 Yes    History of non-ST elevation myocardial infarction (NSTEMI) [I25.2] 09/12/2024 Not Applicable    Stented coronary artery [Z95.5] 09/12/2024 Not Applicable    Chronic obstructive pulmonary disease, unspecified [J44.9] 02/28/2024 Yes     Chronic    Other emphysema [J43.8] 02/28/2024 Yes    Anemia in chronic kidney disease [N18.9, D63.1] 02/26/2024 Yes    GERD (gastroesophageal reflux disease)  [K21.9] 02/19/2024 Yes    ESRD (end stage renal disease) [N18.6] 02/02/2024 Yes    Hypertensive pulmonary vascular disease [I27.20] 02/10/2023 Yes    Former smoker [Z87.891] 07/04/2022 Not Applicable    Paroxysmal atrial fibrillation [I48.0] 09/25/2017 Yes     Chronic    Elevated troponin [R79.89] 01/22/2017 Yes    H/O kidney transplant [Z94.0] 07/10/2015 Not Applicable     Chronic    Secondary renal hyperparathyroidism [N25.81] 05/19/2014 Yes     Chronic    Chest pain in patient with CAD (coronary artery disease) [I25.10]  Yes     Chronic    Mixed hyperlipidemia [E78.2]  Yes     Chronic      Problems Resolved During this Admission:       Discharged Condition: stable    Disposition: Home or Self Care    Patient Instructions:      Ambulatory referral/consult to Cardiology   Standing Status: Future   Referral Priority: Routine Referral Type: Consultation   Referral Reason: Specialty Services Required   Requested Specialty: Cardiology   Number of Visits Requested: 1       Significant Diagnostic Studies: N/A    Pending Diagnostic Studies:       None           Medications:  Reconciled Home Medications:      Medication List        START taking these medications      predniSONE 20 MG tablet  Commonly known as: DELTASONE  Take 2 tablets (40 mg total) by mouth once daily. for 2 days            CONTINUE taking these medications      acetaminophen 500 MG tablet  Commonly known as: TYLENOL  Take 1 tablet (500 mg total) by mouth every 6 (six) hours as needed for Pain.     albuterol 90 mcg/actuation inhaler  Commonly known as: VENTOLIN HFA  Inhale 2 puffs into the lungs every 6 (six) hours as needed for Wheezing or Shortness of Breath. Rescue     albuterol-ipratropium 2.5 mg-0.5 mg/3 mL nebulizer solution  Commonly known as: DUO-NEB  Take 3 mLs by nebulization every 4 (four) hours as needed for Wheezing or Shortness of Breath. Rescue     ALLEGRA ORAL  Take 1 tablet by mouth daily as needed (allergies).     amiodarone 200 MG  Tab  Commonly known as: PACERONE  TAKE 1 TABLET BY MOUTH EVERY DAY     atorvastatin 80 MG tablet  Commonly known as: LIPITOR  Take 1 tablet (80 mg total) by mouth once daily.     b complex vitamins tablet  Commonly known as: B COMPLEX-VITAMIN B12  Take 1 tablet by mouth once daily.     CENTRUM SILVER 0.4 mg-300 mcg- 250 mcg Tab  Generic drug: multivit-min-FA-lycopen-lutein  Take 1 tablet by mouth once daily.     clopidogreL 75 mg tablet  Commonly known as: PLAVIX  Take 1 tablet (75 mg total) by mouth once daily.     ELIQUIS 5 mg Tab  Generic drug: apixaban  TAKE 1 TABLET BY MOUTH TWICE A DAY     famotidine 20 MG tablet  Commonly known as: PEPCID  Take 1 tablet (20 mg total) by mouth once daily. On dialysis days, please take this medicine after dialysis. Only taking on Tue, Thur, and  Sat.     fluticasone furoate-vilanteroL 100-25 mcg/dose diskus inhaler  Commonly known as: BREO  Inhale 1 puff into the lungs once daily. Controller. Use this inhaler every day.     fluticasone propionate 50 mcg/actuation nasal spray  Commonly known as: FLONASE  1 spray (50 mcg total) by Each Nostril route daily as needed for Allergies.     gabapentin 300 MG capsule  Commonly known as: NEURONTIN  Take 1 capsule (300 mg total) by mouth every evening.     ipratropium 21 mcg (0.03 %) nasal spray  Commonly known as: ATROVENT  2 sprays by Each Nostril route 3 (three) times daily.     LIDOcaine 5 %  Commonly known as: LIDODERM  Place 1 patch onto the skin once daily. Remove & Discard patch within 12 hours or as directed by MD     midodrine 5 MG Tab  Commonly known as: PROAMATINE  Take 1 tablet (5 mg total) by mouth 3 (three) times daily as needed (if blood pressure less than 100/60 mmHg).     nitroGLYCERIN 0.4 MG SL tablet  Commonly known as: NITROSTAT  Place 1 tablet (0.4 mg total) under the tongue every 5 (five) minutes as needed for Chest pain.     omeprazole 20 MG capsule  Commonly known as: PRILOSEC  Take 1 capsule (20 mg total) by mouth  daily as needed (heartburn).     sevelamer carbonate 800 mg Tab  Commonly known as: RENVELA  This medicine is used to treat your phosphorus level. Please take 1 tablet (800 mg) once a day with the largest meal of the day.     torsemide 20 MG Tab  Commonly known as: DEMADEX  Take 2 tablets (40 mg total) by mouth 3 (three) times a week.              Indwelling Lines/Drains at time of discharge:   Lines/Drains/Airways       Drain  Duration                  Hemodialysis AV Fistula 02/19/24 0924 Left upper arm 521 days                        Time spent on the discharge of patient: 36 minutes         Jaja Mcknight PA-C  Department of Hospital Medicine  [facility]

## 2025-07-24 NOTE — PLAN OF CARE
Problem: Chronic Kidney Disease  Goal: Electrolyte Balance  7/23/2025 1917 by Sean Francis RN  Outcome: Met  7/23/2025 1914 by Sean Francis RN  Outcome: Progressing  Goal: Fluid Balance  7/23/2025 1917 by Sean Francis, RN  Outcome: Met  7/23/2025 1914 by Sean Francis RN  Outcome: Progressing     Problem: Sepsis/Septic Shock  Goal: Blood Glucose Level Within Targeted Range  7/23/2025 1917 by Sean Francis RN  Outcome: Met  7/23/2025 1914 by Sean Francis RN  Outcome: Progressing     Problem: Fall Injury Risk  Goal: Absence of Fall and Fall-Related Injury  7/23/2025 1917 by Sean Francis RN  Outcome: Met  7/23/2025 1914 by Sean Francis RN  Outcome: Progressing     Problem: Chronic Kidney Disease  Goal: Electrolyte Balance  7/23/2025 1917 by Sean Francis RN  Outcome: Met  7/23/2025 1914 by Sean Francis RN  Outcome: Progressing  Goal: Fluid Balance  7/23/2025 1917 by Sean Francis RN  Outcome: Met  7/23/2025 1914 by Sean Francis RN  Outcome: Progressing     Problem: Hospitalized Older Adult  Goal: Optimal Cognitive Function  7/23/2025 1917 by Sean Francis RN  Outcome: Met  7/23/2025 1914 by Sean Francis RN  Outcome: Progressing  Goal: Effective Bowel Elimination  7/23/2025 1917 by Sean Francis, RN  Outcome: Met  7/23/2025 1914 by Sean Francis RN  Outcome: Progressing  Goal: Optimal Coping  7/23/2025 1917 by Sean Francis RN  Outcome: Met  7/23/2025 1914 by Sean Francis RN  Outcome: Progressing  Goal: Fluid and Electrolyte Balance  7/23/2025 1917 by Sean Francis RN  Outcome: Met  7/23/2025 1914 by Sean Francis RN  Outcome: Progressing  Goal: Optimal Functional Ability  7/23/2025 1917 by Sean Francis, RN  Outcome: Met  7/23/2025 1914 by Sean Francis RN  Outcome: Progressing  Goal: Improved Oral Intake  7/23/2025 1917 by Sean Francis, RN  Outcome: Met  7/23/2025 1914 by Sean Francis, RN  Outcome:  Progressing  Goal: Adequate Sleep/Rest  7/23/2025 1917 by Sean Francis RN  Outcome: Met  7/23/2025 1914 by Sean Francis RN  Outcome: Progressing  Goal: Effective Urinary Elimination  7/23/2025 1917 by Sean Francis RN  Outcome: Met  7/23/2025 1914 by Sean Francis RN  Outcome: Progressing     Problem: Hospitalized Older Adult  Goal: Improved Oral Intake  7/23/2025 1917 by Sean Francis RN  Outcome: Met  7/23/2025 1914 by Sean Francis RN  Outcome: Progressing     Problem: Hospitalized Older Adult  Goal: Adequate Sleep/Rest  7/23/2025 1917 by Sean Francis RN  Outcome: Met  7/23/2025 1914 by Sean Francis RN  Outcome: Progressing     Problem: Wound  Goal: Optimal Coping  7/23/2025 1917 by Sean Francis RN  Outcome: Met  7/23/2025 1914 by Sean Francis RN  Outcome: Progressing  Goal: Optimal Functional Ability  7/23/2025 1917 by Sean Francis RN  Outcome: Met  7/23/2025 1914 by Sean Francis RN  Outcome: Progressing  Goal: Absence of Infection Signs and Symptoms  7/23/2025 1917 by Sean Francis RN  Outcome: Met  7/23/2025 1914 by Sean Francis RN  Outcome: Progressing  Goal: Improved Oral Intake  7/23/2025 1917 by Sean Francis, RN  Outcome: Met  7/23/2025 1914 by Sean Franics RN  Outcome: Progressing  Goal: Optimal Pain Control and Function  7/23/2025 1917 by Sean Francis RN  Outcome: Met  7/23/2025 1914 by Sean Francis RN  Outcome: Progressing  Goal: Skin Health and Integrity  7/23/2025 1917 by Sean Francis RN  Outcome: Met  7/23/2025 1914 by Sean Francis RN  Outcome: Progressing  Goal: Optimal Wound Healing  7/23/2025 1917 by Sean Francis, RN  Outcome: Met  7/23/2025 1914 by Sean Francis RN  Outcome: Progressing

## 2025-07-24 NOTE — PLAN OF CARE
Problem: Adult Inpatient Plan of Care  Goal: Plan of Care Review  Outcome: Progressing  Goal: Patient-Specific Goal (Individualized)  Outcome: Progressing  Goal: Absence of Hospital-Acquired Illness or Injury  Outcome: Progressing  Goal: Optimal Comfort and Wellbeing  Outcome: Progressing  Goal: Readiness for Transition of Care  Outcome: Progressing     Problem: COPD (Chronic Obstructive Pulmonary Disease)  Goal: Optimal Chronic Illness Coping  Outcome: Progressing  Goal: Optimal Level of Functional Me[place]  Outcome: Progressing  Goal: Absence of Infection Signs and Symptoms  Outcome: Progressing  Goal: Improved Oral Intake  Outcome: Progressing  Goal: Effective Oxygenation and Ventilation  Outcome: Progressing     Problem: Fall Injury Risk  Goal: Absence of Fall and Fall-Related Injury  Outcome: Progressing     Problem: Sepsis/Septic Shock  Goal: Optimal Nutrition Intake  Outcome: Progressing     Problem: Chronic Kidney Disease  Goal: Electrolyte Balance  Outcome: Progressing  Goal: Fluid Balance  Outcome: Progressing     Problem: Hospitalized Older Adult  Goal: Optimal Cognitive Function  Outcome: Progressing  Goal: Effective Bowel Elimination  Outcome: Progressing  Goal: Optimal Coping  Outcome: Progressing  Goal: Fluid and Electrolyte Balance  Outcome: Progressing  Goal: Optimal Functional Ability  Outcome: Progressing  Goal: Improved Oral Intake  Outcome: Progressing  Goal: Adequate Sleep/Rest  Outcome: Progressing  Goal: Effective Urinary Elimination  Outcome: Progressing

## 2025-07-24 NOTE — PLAN OF CARE
[facility]  Discharge Final Note    Primary Care Provider: Anatoliy Colindres MD    Expected Discharge Date: 7/23/2025    Final Discharge Note (most recent)       Final Note - 07/24/25 0836          Final Note    Assessment Type Final Discharge Note     Anticipated Discharge Disposition Home or Self Care     Hospital Resources/Appts/Education Provided Provided patient/caregiver with written discharge plan information;Provided education on problems/symptoms using teachback;Appointments scheduled and added to AVS        Post-Acute Status    Discharge Delays None known at this time                     Future Appointments   Date Time Provider Department Center   7/28/2025  8:30 AM Osiris Gaytan NP [facility] IM [address] PCW   7/28/2025  9:30 AM Jeanne Ohara, PT [facility] [place]   7/30/2025  9:30 AM Jeanne Ohara, PT [facility] [place]   8/6/2025  8:30 AM Manuel Jerry MD [facility] PAINMA [place]   9/12/2025  8:15 AM VASCULAR, LAB [facility] VASCLAB [facility]   9/12/2025  9:00 AM JUANI Melendez III, MD [facility] VASCSUR [facility]   9/12/2025 10:00 AM LAB, APPOINTMENT [facility] LAB [facility]   9/30/2025  8:20 AM Hussain Vail MD PhD [facility] [address]   10/3/2025  1:20 PM Hussain Vail MD PhD [facility] [place]   1/12/2026  9:00 AM Anatoliy Colindres MD [facility] [address] PCW   1/12/2026  9:20 AM [facility]res MD [facility] PC[facility]     Pt discharged home with no services.  Bryon Lr, RN,BSN      Important Message from Medicare

## 2025-07-25 ENCOUNTER — PATIENT OUTREACH (OUTPATIENT)
Dept: ADMINISTRATIVE | Facility: CLINIC | Age: 70
End: 2025-07-25
Payer: MEDICARE

## 2025-07-25 NOTE — PROGRESS NOTES
C3 nurse attempted to contact Ibrahima Phelps and patient's spouse, Shea, for a TCC post hospital discharge follow up call. No answer.  Voicemail left with callback information. The patient has a scheduled HOSFU appointment with  Osiris Gaytan NP on 7/28/2025 at 8:30 am.

## 2025-07-29 ENCOUNTER — HOSPITAL ENCOUNTER (INPATIENT)
Facility: HOSPITAL | Age: 70
LOS: 3 days | Discharge: HOME | DRG: 871 | End: 2025-08-01
Attending: EMERGENCY MEDICINE | Admitting: STUDENT IN AN ORGANIZED HEALTH CARE EDUCATION/TRAINING PROGRAM
Payer: MEDICARE

## 2025-07-29 DIAGNOSIS — R06.02 SHORTNESS OF BREATH: ICD-10-CM

## 2025-07-29 DIAGNOSIS — N18.6 ESRD (END STAGE RENAL DISEASE): Primary | ICD-10-CM

## 2025-07-29 DIAGNOSIS — J15.9 COMMUNITY ACQUIRED BACTERIAL PNEUMONIA: ICD-10-CM

## 2025-07-29 DIAGNOSIS — R06.02 SOB (SHORTNESS OF BREATH): ICD-10-CM

## 2025-07-29 LAB
ABSOLUTE EOSINOPHIL (OHS): 0.2 K/UL
ABSOLUTE MONOCYTE (OHS): 0.62 K/UL (ref 0.3–1)
ABSOLUTE NEUTROPHIL COUNT (OHS): 7.15 K/UL (ref 1.8–7.7)
ALBUMIN SERPL BCP-MCNC: 2.6 G/DL (ref 3.5–5.2)
ALBUMIN SERPL BCP-MCNC: 2.8 G/DL (ref 3.5–5.2)
ALP SERPL-CCNC: 95 UNIT/L (ref 40–150)
ALT SERPL W/O P-5'-P-CCNC: 15 UNIT/L (ref 0–55)
ANION GAP (OHS): 12 MMOL/L (ref 8–16)
ANION GAP (OHS): 15 MMOL/L (ref 8–16)
AST SERPL-CCNC: 33 UNIT/L (ref 0–50)
BASOPHILS # BLD AUTO: 0.01 K/UL
BASOPHILS NFR BLD AUTO: 0.1 %
BILIRUB SERPL-MCNC: 0.4 MG/DL (ref 0.1–1)
BIPAP: 0
BIPAP: 0
BUN SERPL-MCNC: 63 MG/DL (ref 8–23)
BUN SERPL-MCNC: 67 MG/DL (ref 8–23)
CA-I BLD-SCNC: 0.92 MMOL/L (ref 1.06–1.42)
CALCIUM SERPL-MCNC: 7.1 MG/DL (ref 8.7–10.5)
CALCIUM SERPL-MCNC: 7.2 MG/DL (ref 8.7–10.5)
CHLORIDE SERPL-SCNC: 102 MMOL/L (ref 95–110)
CHLORIDE SERPL-SCNC: 104 MMOL/L (ref 95–110)
CO2 SERPL-SCNC: 19 MMOL/L (ref 23–29)
CO2 SERPL-SCNC: 20 MMOL/L (ref 23–29)
CORRECTED TEMPERATURE (PCO2): 34.2 MMHG
CORRECTED TEMPERATURE (PCO2): 41 MMHG
CORRECTED TEMPERATURE (PH): 7.35
CORRECTED TEMPERATURE (PH): 7.4
CORRECTED TEMPERATURE (PO2): 33.5 MMHG
CORRECTED TEMPERATURE (PO2): 38.7 MMHG
CREAT SERPL-MCNC: 11.6 MG/DL (ref 0.5–1.4)
CREAT SERPL-MCNC: 11.6 MG/DL (ref 0.5–1.4)
ERYTHROCYTE [DISTWIDTH] IN BLOOD BY AUTOMATED COUNT: 17.3 % (ref 11.5–14.5)
FIO2: 21 %
FIO2: 21 %
GFR SERPLBLD CREATININE-BSD FMLA CKD-EPI: 4 ML/MIN/1.73/M2
GFR SERPLBLD CREATININE-BSD FMLA CKD-EPI: 4 ML/MIN/1.73/M2
GLUCOSE SERPL-MCNC: 78 MG/DL (ref 70–110)
GLUCOSE SERPL-MCNC: 82 MG/DL (ref 70–110)
HCT VFR BLD AUTO: 32 % (ref 40–54)
HCT VFR BLD CALC: 33.3 % (ref 36–54)
HGB BLD-MCNC: 10 GM/DL (ref 14–18)
IMM GRANULOCYTES # BLD AUTO: 0.1 K/UL (ref 0–0.04)
IMM GRANULOCYTES NFR BLD AUTO: 1.1 % (ref 0–0.5)
INFLUENZA A MOLECULAR (OHS): NEGATIVE
INFLUENZA B MOLECULAR (OHS): NEGATIVE
LACTATE SERPL-SCNC: 1.2 MMOL/L (ref 0.5–2.2)
LDH SERPL L TO P-CCNC: 2.6 MMOL/L (ref 0.5–2.2)
LYMPHOCYTES # BLD AUTO: 0.84 K/UL (ref 1–4.8)
MAGNESIUM SERPL-MCNC: 2 MG/DL (ref 1.6–2.6)
MCH RBC QN AUTO: 26.9 PG (ref 27–31)
MCHC RBC AUTO-ENTMCNC: 31.3 G/DL (ref 32–36)
MCV RBC AUTO: 86 FL (ref 82–98)
NT-PROBNP SERPL-MCNC: 9716 PG/ML
NUCLEATED RBC (/100WBC) (OHS): 0 /100 WBC
OHS QRS DURATION: 104 MS
OHS QTC CALCULATION: 438 MS
PCO2 BLDA: 34.2 MMHG (ref 35–45)
PCO2 BLDA: 41 MMHG (ref 35–45)
PH SMN: 7.35 [PH] (ref 7.35–7.45)
PH SMN: 7.4 [PH] (ref 7.35–7.45)
PHOSPHATE SERPL-MCNC: 3.3 MG/DL (ref 2.7–4.5)
PLATELET # BLD AUTO: 123 K/UL (ref 150–450)
PMV BLD AUTO: 12.1 FL (ref 9.2–12.9)
PO2 BLDA: 33.5 MMHG (ref 40–60)
PO2 BLDA: 38.7 MMHG (ref 40–60)
POC BASE DEFICIT: -2.9 MMOL/L
POC BASE DEFICIT: -3.1 MMOL/L
POC HCO3: 21.3 MMOL/L (ref 24–28)
POC HCO3: 21.4 MMOL/L (ref 24–28)
POC IONIZED CALCIUM: 0.84 MMOL/L (ref 1.06–1.42)
POC PERFORMED BY: ABNORMAL
POC PERFORMED BY: ABNORMAL
POC TEMPERATURE: 37 C
POC TEMPERATURE: 37 C
POTASSIUM BLD-SCNC: 4.4 MMOL/L (ref 3.5–5.1)
POTASSIUM SERPL-SCNC: 4.7 MMOL/L (ref 3.5–5.1)
POTASSIUM SERPL-SCNC: 4.7 MMOL/L (ref 3.5–5.1)
PROT SERPL-MCNC: 6.2 GM/DL (ref 6–8.4)
RBC # BLD AUTO: 3.72 M/UL (ref 4.6–6.2)
RELATIVE EOSINOPHIL (OHS): 2.2 %
RELATIVE LYMPHOCYTE (OHS): 9.4 % (ref 18–48)
RELATIVE MONOCYTE (OHS): 7 % (ref 4–15)
RELATIVE NEUTROPHIL (OHS): 80.2 % (ref 38–73)
SARS-COV-2 RDRP RESP QL NAA+PROBE: NEGATIVE
SODIUM BLD-SCNC: 134 MMOL/L (ref 136–145)
SODIUM SERPL-SCNC: 135 MMOL/L (ref 136–145)
SODIUM SERPL-SCNC: 137 MMOL/L (ref 136–145)
SPECIMEN SOURCE: ABNORMAL
SPECIMEN SOURCE: ABNORMAL
TROPONIN I SERPL HS-MCNC: 151 NG/L
WBC # BLD AUTO: 8.92 K/UL (ref 3.9–12.7)

## 2025-07-29 PROCEDURE — 84295 ASSAY OF SERUM SODIUM: CPT | Mod: HCNC

## 2025-07-29 PROCEDURE — 82330 ASSAY OF CALCIUM: CPT | Mod: HCNC | Performed by: STUDENT IN AN ORGANIZED HEALTH CARE EDUCATION/TRAINING PROGRAM

## 2025-07-29 PROCEDURE — 83605 ASSAY OF LACTIC ACID: CPT | Mod: HCNC

## 2025-07-29 PROCEDURE — 25000003 PHARM REV CODE 250: Mod: HCNC | Performed by: STUDENT IN AN ORGANIZED HEALTH CARE EDUCATION/TRAINING PROGRAM

## 2025-07-29 PROCEDURE — 99223 1ST HOSP IP/OBS HIGH 75: CPT | Mod: HCNC,,, | Performed by: INTERNAL MEDICINE

## 2025-07-29 PROCEDURE — 87502 INFLUENZA DNA AMP PROBE: CPT | Mod: HCNC | Performed by: EMERGENCY MEDICINE

## 2025-07-29 PROCEDURE — 83605 ASSAY OF LACTIC ACID: CPT | Mod: HCNC | Performed by: STUDENT IN AN ORGANIZED HEALTH CARE EDUCATION/TRAINING PROGRAM

## 2025-07-29 PROCEDURE — 80069 RENAL FUNCTION PANEL: CPT | Mod: HCNC | Performed by: STUDENT IN AN ORGANIZED HEALTH CARE EDUCATION/TRAINING PROGRAM

## 2025-07-29 PROCEDURE — 82803 BLOOD GASES ANY COMBINATION: CPT | Mod: HCNC

## 2025-07-29 PROCEDURE — 83735 ASSAY OF MAGNESIUM: CPT | Mod: HCNC | Performed by: STUDENT IN AN ORGANIZED HEALTH CARE EDUCATION/TRAINING PROGRAM

## 2025-07-29 PROCEDURE — 96374 THER/PROPH/DIAG INJ IV PUSH: CPT | Mod: HCNC

## 2025-07-29 PROCEDURE — 85014 HEMATOCRIT: CPT | Mod: HCNC

## 2025-07-29 PROCEDURE — 99900035 HC TECH TIME PER 15 MIN (STAT): Mod: HCNC

## 2025-07-29 PROCEDURE — 85025 COMPLETE CBC W/AUTO DIFF WBC: CPT | Mod: HCNC | Performed by: EMERGENCY MEDICINE

## 2025-07-29 PROCEDURE — U0002 COVID-19 LAB TEST NON-CDC: HCPCS | Mod: HCNC | Performed by: EMERGENCY MEDICINE

## 2025-07-29 PROCEDURE — 82330 ASSAY OF CALCIUM: CPT | Mod: HCNC

## 2025-07-29 PROCEDURE — 83880 ASSAY OF NATRIURETIC PEPTIDE: CPT | Mod: HCNC | Performed by: EMERGENCY MEDICINE

## 2025-07-29 PROCEDURE — 93005 ELECTROCARDIOGRAM TRACING: CPT | Mod: HCNC

## 2025-07-29 PROCEDURE — 84132 ASSAY OF SERUM POTASSIUM: CPT | Mod: HCNC

## 2025-07-29 PROCEDURE — 11000001 HC ACUTE MED/SURG PRIVATE ROOM: Mod: HCNC

## 2025-07-29 PROCEDURE — 63600175 PHARM REV CODE 636 W HCPCS: Mod: HCNC | Performed by: EMERGENCY MEDICINE

## 2025-07-29 PROCEDURE — 93010 ELECTROCARDIOGRAM REPORT: CPT | Mod: HCNC,,, | Performed by: INTERNAL MEDICINE

## 2025-07-29 PROCEDURE — 82040 ASSAY OF SERUM ALBUMIN: CPT | Mod: HCNC | Performed by: EMERGENCY MEDICINE

## 2025-07-29 PROCEDURE — 87040 BLOOD CULTURE FOR BACTERIA: CPT | Mod: HCNC | Performed by: EMERGENCY MEDICINE

## 2025-07-29 PROCEDURE — 96375 TX/PRO/DX INJ NEW DRUG ADDON: CPT | Mod: HCNC

## 2025-07-29 PROCEDURE — 84484 ASSAY OF TROPONIN QUANT: CPT | Mod: HCNC | Performed by: EMERGENCY MEDICINE

## 2025-07-29 PROCEDURE — 99285 EMERGENCY DEPT VISIT HI MDM: CPT | Mod: 25,HCNC

## 2025-07-29 PROCEDURE — 25000003 PHARM REV CODE 250: Mod: HCNC | Performed by: EMERGENCY MEDICINE

## 2025-07-29 RX ORDER — AMIODARONE HYDROCHLORIDE 200 MG/1
200 TABLET ORAL DAILY
Status: DISCONTINUED | OUTPATIENT
Start: 2025-07-30 | End: 2025-08-01 | Stop reason: HOSPADM

## 2025-07-29 RX ORDER — HYDROXYZINE PAMOATE 25 MG/1
25 CAPSULE ORAL
Status: COMPLETED | OUTPATIENT
Start: 2025-07-29 | End: 2025-07-29

## 2025-07-29 RX ORDER — POLYETHYLENE GLYCOL 3350 17 G/17G
17 POWDER, FOR SOLUTION ORAL DAILY PRN
Status: DISCONTINUED | OUTPATIENT
Start: 2025-07-29 | End: 2025-08-01 | Stop reason: HOSPADM

## 2025-07-29 RX ORDER — SEVELAMER CARBONATE 800 MG/1
800 TABLET, FILM COATED ORAL
Status: DISCONTINUED | OUTPATIENT
Start: 2025-07-30 | End: 2025-08-01 | Stop reason: HOSPADM

## 2025-07-29 RX ORDER — MIDODRINE HYDROCHLORIDE 5 MG/1
5 TABLET ORAL
Status: COMPLETED | OUTPATIENT
Start: 2025-07-29 | End: 2025-07-29

## 2025-07-29 RX ORDER — MIDODRINE HYDROCHLORIDE 5 MG/1
5 TABLET ORAL 3 TIMES DAILY PRN
Status: DISCONTINUED | OUTPATIENT
Start: 2025-07-29 | End: 2025-08-01 | Stop reason: HOSPADM

## 2025-07-29 RX ORDER — CEFTRIAXONE 1 G/1
1 INJECTION, POWDER, FOR SOLUTION INTRAMUSCULAR; INTRAVENOUS
Status: COMPLETED | OUTPATIENT
Start: 2025-07-29 | End: 2025-07-29

## 2025-07-29 RX ORDER — TORSEMIDE 20 MG/1
40 TABLET ORAL
Status: DISCONTINUED | OUTPATIENT
Start: 2025-07-30 | End: 2025-08-01 | Stop reason: HOSPADM

## 2025-07-29 RX ORDER — ACETAMINOPHEN 325 MG/1
650 TABLET ORAL
Status: COMPLETED | OUTPATIENT
Start: 2025-07-29 | End: 2025-07-29

## 2025-07-29 RX ORDER — BISACODYL 10 MG/1
10 SUPPOSITORY RECTAL DAILY PRN
Status: DISCONTINUED | OUTPATIENT
Start: 2025-07-29 | End: 2025-08-01 | Stop reason: HOSPADM

## 2025-07-29 RX ORDER — LORAZEPAM 1 MG/1
1 TABLET ORAL ONCE
Status: COMPLETED | OUTPATIENT
Start: 2025-07-29 | End: 2025-07-29

## 2025-07-29 RX ORDER — FAMOTIDINE 20 MG/1
20 TABLET, FILM COATED ORAL DAILY
Status: DISCONTINUED | OUTPATIENT
Start: 2025-07-30 | End: 2025-07-30

## 2025-07-29 RX ORDER — ATORVASTATIN CALCIUM 40 MG/1
80 TABLET, FILM COATED ORAL DAILY
Status: DISCONTINUED | OUTPATIENT
Start: 2025-07-30 | End: 2025-08-01 | Stop reason: HOSPADM

## 2025-07-29 RX ORDER — METHOCARBAMOL 500 MG/1
500 TABLET, FILM COATED ORAL ONCE
Status: DISCONTINUED | OUTPATIENT
Start: 2025-07-29 | End: 2025-07-30

## 2025-07-29 RX ORDER — CLOPIDOGREL BISULFATE 75 MG/1
75 TABLET ORAL DAILY
Status: DISCONTINUED | OUTPATIENT
Start: 2025-07-30 | End: 2025-08-01 | Stop reason: HOSPADM

## 2025-07-29 RX ORDER — SODIUM CHLORIDE 0.9 % (FLUSH) 0.9 %
10 SYRINGE (ML) INJECTION EVERY 12 HOURS PRN
Status: DISCONTINUED | OUTPATIENT
Start: 2025-07-29 | End: 2025-08-01 | Stop reason: HOSPADM

## 2025-07-29 RX ORDER — PROCHLORPERAZINE EDISYLATE 5 MG/ML
5 INJECTION INTRAMUSCULAR; INTRAVENOUS EVERY 6 HOURS PRN
Status: DISCONTINUED | OUTPATIENT
Start: 2025-07-29 | End: 2025-08-01 | Stop reason: HOSPADM

## 2025-07-29 RX ORDER — SODIUM CHLORIDE 9 MG/ML
500 INJECTION, SOLUTION INTRAVENOUS
Status: COMPLETED | OUTPATIENT
Start: 2025-07-29 | End: 2025-07-29

## 2025-07-29 RX ORDER — TALC
6 POWDER (GRAM) TOPICAL NIGHTLY PRN
Status: DISCONTINUED | OUTPATIENT
Start: 2025-07-29 | End: 2025-08-01 | Stop reason: HOSPADM

## 2025-07-29 RX ORDER — ACETAMINOPHEN 325 MG/1
650 TABLET ORAL EVERY 8 HOURS PRN
Status: DISCONTINUED | OUTPATIENT
Start: 2025-07-29 | End: 2025-08-01 | Stop reason: HOSPADM

## 2025-07-29 RX ORDER — GABAPENTIN 300 MG/1
300 CAPSULE ORAL NIGHTLY
Status: DISCONTINUED | OUTPATIENT
Start: 2025-07-29 | End: 2025-08-01 | Stop reason: HOSPADM

## 2025-07-29 RX ORDER — ONDANSETRON 8 MG/1
8 TABLET, ORALLY DISINTEGRATING ORAL EVERY 8 HOURS PRN
Status: DISCONTINUED | OUTPATIENT
Start: 2025-07-29 | End: 2025-08-01 | Stop reason: HOSPADM

## 2025-07-29 RX ORDER — IPRATROPIUM BROMIDE AND ALBUTEROL SULFATE 2.5; .5 MG/3ML; MG/3ML
3 SOLUTION RESPIRATORY (INHALATION) EVERY 4 HOURS PRN
Status: DISCONTINUED | OUTPATIENT
Start: 2025-07-29 | End: 2025-08-01 | Stop reason: HOSPADM

## 2025-07-29 RX ORDER — CEFTRIAXONE 1 G/1
1 INJECTION, POWDER, FOR SOLUTION INTRAMUSCULAR; INTRAVENOUS DAILY
Status: DISCONTINUED | OUTPATIENT
Start: 2025-07-30 | End: 2025-08-01 | Stop reason: HOSPADM

## 2025-07-29 RX ORDER — AZITHROMYCIN 250 MG/1
500 TABLET, FILM COATED ORAL DAILY
Status: COMPLETED | OUTPATIENT
Start: 2025-07-30 | End: 2025-07-31

## 2025-07-29 RX ORDER — FLUTICASONE FUROATE AND VILANTEROL 100; 25 UG/1; UG/1
1 POWDER RESPIRATORY (INHALATION) DAILY
Status: DISCONTINUED | OUTPATIENT
Start: 2025-07-30 | End: 2025-08-01 | Stop reason: HOSPADM

## 2025-07-29 RX ORDER — NALOXONE HCL 0.4 MG/ML
0.02 VIAL (ML) INJECTION
Status: DISCONTINUED | OUTPATIENT
Start: 2025-07-29 | End: 2025-08-01 | Stop reason: HOSPADM

## 2025-07-29 RX ADMIN — ACETAMINOPHEN 650 MG: 325 TABLET ORAL at 04:07

## 2025-07-29 RX ADMIN — SODIUM CHLORIDE 500 ML: 9 INJECTION, SOLUTION INTRAVENOUS at 04:07

## 2025-07-29 RX ADMIN — MIDODRINE HYDROCHLORIDE 5 MG: 5 TABLET ORAL at 01:07

## 2025-07-29 RX ADMIN — AZITHROMYCIN MONOHYDRATE 500 MG: 500 INJECTION, POWDER, LYOPHILIZED, FOR SOLUTION INTRAVENOUS at 02:07

## 2025-07-29 RX ADMIN — CEFTRIAXONE SODIUM 1 G: 1 INJECTION, POWDER, FOR SOLUTION INTRAMUSCULAR; INTRAVENOUS at 02:07

## 2025-07-29 RX ADMIN — HYDROXYZINE PAMOATE 25 MG: 25 CAPSULE ORAL at 04:07

## 2025-07-29 RX ADMIN — APIXABAN 5 MG: 5 TABLET, FILM COATED ORAL at 08:07

## 2025-07-29 RX ADMIN — GABAPENTIN 300 MG: 300 CAPSULE ORAL at 08:07

## 2025-07-29 RX ADMIN — LORAZEPAM 1 MG: 1 TABLET ORAL at 05:07

## 2025-07-29 NOTE — ASSESSMENT & PLAN NOTE
Anemia is likely due to chronic disease due to ESRD. Most recent hemoglobin and hematocrit are listed below.  Recent Labs     07/29/25  1301 07/29/25  1641   HGB 10.0*  --    HCT 32.0* 33.3     Plan  - Monitor serial CBC: Daily  - Transfuse PRBC if patient becomes hemodynamically unstable, symptomatic or H/H drops below 7/21.  - Patient has not received any PRBC transfusions to date  - Patient's anemia is currently stable

## 2025-07-29 NOTE — CONSULTS
[facility] - [facility]   Nephrology  Consult Note          Patient Name: Ibrahima Phelps   MRN: [id number]   Current Provider: Juan Miller MD  Primary Care Provider: Anatoliy Colindres MD   Admission Date: 7/29/2025   Hospital Day: 0  Bed: ED 32/32  Principal Problem: <principal problem not specified>            NEPHROLOGY NOTE    Reason for Consultation:   ESRD    History of Present Illness:      Ibrahima Phelps is a 69 y.o. male with PMHx of Failed kidney transplant in 1992 and then in 2005 now ESRD on HD ,coronary artery disease, MI, hypertension, atrial fibrillation, pericarditis, ESRD on TTS schedule. Wife diagnosed with COVID recently     Admitted to the hospital with fevers this morning, along with shivering and shortness of breath. Did not go for his dialysis session today     Hypotensive 85/47 at presentation, given midodrine and started on antibiotics for presumed pneumonia     Nephrology consulted for dialysis management.    Today no signs of fluid overload, low pressures     Past Medical History:   Diagnosis Date    Atrial fibrillation     CAD (coronary artery disease) 2006    MI in 2006    CHF (congestive heart failure) 2017    CKD (chronic kidney disease) stage 3, GFR 30-59 ml/min     Dr. Latif.  in transplanted kidney    CKD (chronic kidney disease) stage 5, GFR less than 15 ml/min 02/02/2024    COVID-19 02/07/2023    Diverticulosis     Hyperlipidemia     Hypertension     Keloid cicatrix     Metabolic bone disease     Other emphysema 10/01/2019    Pericarditis     S/P kidney transplant 1992    x2 (1992 & 2005),    Thrombocytopenia     Thyroid disease     Tobacco use 09/05/2014     Past Surgical History:   Procedure Laterality Date    AV FISTULA PLACEMENT Left 12/18/2023    Procedure: CREATION, AV FISTULA;  Surgeon: JUANI Melendez III, MD;  Location: [facility] OR [address] [place] [address];  Service: Vascular;  Laterality: Left;  LUE AVF creation vs AVG insertion    CARDIOVERSION  04/30/15    CHOLECYSTECTOMY       "COLONOSCOPY  April 20, 2011    Diverticulosis, repeat recommended in 3 yrs., repeat colonoscopy 2014 revealed 2 polyps.  He should return in 5 years.    COLONOSCOPY N/A 3/13/2020    Procedure: COLONOSCOPY;  Surgeon: Chon Casper MD;  Location: [facility] (4TH FLR);  Service: Endoscopy;  Laterality: N/A;  ok to hold coumadin x5days-see telephone encounter 2/4/20-tb    COLONOSCOPY N/A 2/4/2021    Procedure: COLONOSCOPY;  Surgeon: Chon Casper MD;  Location: [facility] (4TH FLR);  Service: Endoscopy;  Laterality: N/A;  Eliquis - per Dr. GAVIN Blunt ok to hold x 2 days and "restarted asap"- ERW  last prep "poor", [id number] ordered/ Pt requests Dr. Casper  prep ins. mailed - COVID screening on 2/1/21 PCW- ERW    COLONOSCOPY N/A 3/3/2021    Procedure: COLONOSCOPY;  Surgeon: Chon Casper MD;  Location: [facility] (4TH FLR);  Service: Endoscopy;  Laterality: N/A;  Patient with his second poor bowel pre and has poor prep today.  If patient not intersted or can't get colonoscopy tomorrow will need constipation bowel prep and will need to restart his Eliquis today.Thanks,Chon     per Dr Blunt-ok to hold Eliquis 2 days prior      COVID     COLONOSCOPY N/A 12/6/2024    Procedure: COLONOSCOPY;  Surgeon: Chon Casper MD;  Location: [facility] (2ND FLR);  Service: Endoscopy;  Laterality: N/A;  Plavix-Eliquis pending/ HD T-Th-Sat- lab ordered  2/25/24 ECHO PA 49  ref by / [facility] inst portal-RN  11/29-message to clearance nurse for plavix and eliquis hold-tb  ok to hold Eliquis 2 days per Dr Agata chua to hold Plavix 5 days per MEENA CUNNINGHAM-GT  12/2- p    CORONARY ANGIOGRAPHY N/A 2/28/2024    Procedure: ANGIOGRAM, CORONARY ARTERY;  Surgeon: Tylor Lincoln MD;  Location: [facility] CATH LAB;  Service: Cardiology;  Laterality: N/A;    CORONARY ANGIOGRAPHY N/A 7/22/2025    Procedure: ANGIOGRAM, CORONARY ARTERY;  Surgeon: Tylor Lincoln MD;  Location: [facility] CATH LAB;  Service: Cardiology;  Laterality: N/A;    " CORONARY ANGIOPLASTY WITH STENT PLACEMENT  9/13/2006    FISTULOGRAM N/A 2/19/2024    Procedure: Fistulogram;  Surgeon: JUANI Melendez III, MD;  Location: [facility] CATH LAB;  Service: Peripheral Vascular;  Laterality: N/A;    FISTULOGRAM, WITH PTA Left 6/3/2024    Procedure: FISTULOGRAM, WITH PTA;  Surgeon: JUANI Melendez III, MD;  Location: [facility] OR [facility] FLR;  Service: Vascular;  Laterality: Left;  mGy: 51.58  GyCm2: 6.8285  Fluoro time: 5.1 min  Contrast: 28 cc    FISTULOGRAM, WITH PTA Left 3/5/2025    Procedure: FISTULOGRAM, WITH PTA;  Surgeon: JUANI Melendez III, MD;  Location: [facility] OR [facility] FLR;  Service: Vascular;  Laterality: Left;    IRRIGATION AND DEBRIDEMENT Right 8/30/2023    Procedure: IRRIGATION AND DEBRIDEMENT, RIGHT MIDDLE FINGER;  Surgeon: Martin Larsen MD;  [facility];  Service: Orthopedics;  Laterality: Right;    KIDNEY TRANSPLANT  2005    LEFT HEART CATHETERIZATION N/A 2/26/2024    Procedure: Left heart cath;  Surgeon: Tylor Lincoln MD;  Location: [facility] CATH LAB;  Service: Cardiology;  Laterality: N/A;    PARATHYROIDECTOMY      PERCUTANEOUS TRANSLUMINAL ANGIOPLASTY OF ARTERIOVENOUS FISTULA Left 2/19/2024    Procedure: PTA, AV FISTULA;  Surgeon: JUNAI Melendez III, MD;  Location: [facility] CATH LAB;  Service: Peripheral Vascular;  Laterality: Left;    STENT, DRUG ELUTING, SINGLE VESSEL, CORONARY N/A 2/28/2024    Procedure: Stent, Drug Eluting, Single Vessel, Coronary;  Surgeon: Tylor Lincoln MD;  Location: [facility] CATH LAB;  Service: Cardiology;  Laterality: N/A;    STENT, PERIPHERAL GRAFT Left 3/5/2025    Procedure: STENT, PERIPHERAL GRAFT;  Surgeon: JUANI Melendez III, MD;  [facility];  Service: Vascular;  Laterality: Left;  mGy 63.17  Gycm2 8.3386  flouro 63.17 min  contrast 38 ml    TREATMENT OF CARDIAC ARRHYTHMIA N/A 3/28/2022    Procedure: CARDIOVERSION;  Surgeon: Migue Blunt MD;  Location: [facility] EP LAB;  Service: Cardiology;  Laterality: N/A;   "AF, DCC, MAC, GP, 3 PREP*RODRIGO deferred pt 100% compliant*    VENOPLASTY  6/3/2024    Procedure: ANGIOPLASTY, VEIN;  Surgeon: JUANI Melendez III, MD;  Location: [facility] OR [address] [place] [address];  Service: Vascular;;  Left subclavian     Medications:   acetaminophen  650 mg Oral ED 1 Time    hydrOXYzine pamoate  25 mg Oral ED 1 Time      Review of patient's allergies indicates:   Allergen Reactions    Ace inhibitors Swelling    Iodine Itching    Verapamil Other (See Comments)     Other reaction(s): Unknown    Povidone-iodine Itching     Family History   Problem Relation Name Age of Onset    No Known Problems Sister      No Known Problems Brother      Thyroid disease Mother          s/p surgery    Heart disease Father          had pacemaker    No Known Problems Sister      Kidney failure Sister      Kidney disease Sister      No Known Problems Sister      Kidney disease Brother      Kidney failure Brother          s/p transplant    Diabetes Mellitus Neg Hx      Stroke Neg Hx      Heart attack Neg Hx      Cancer Neg Hx      Celiac disease Neg Hx      Cirrhosis Neg Hx      Colon cancer Neg Hx      Esophageal cancer Neg Hx      Inflammatory bowel disease Neg Hx      Rectal cancer Neg Hx      Stomach cancer Neg Hx      Ulcerative colitis Neg Hx      Liver disease Neg Hx      Liver cancer Neg Hx      Crohn's disease Neg Hx      Melanoma Neg Hx        Social History[1]     Review of Systems:      ROS is negative except for what is mentioned in the HPI    Physical Examination:      Vital Signs   BP (!) 73/43   Pulse 70   Temp 98.5 °F (36.9 °C) (Oral)   Resp 20   Ht 6' 1" (1.854 m)   Wt 92.5 kg (204 lb)   SpO2 96%   BMI 26.91 kg/m²    No intake/output data recorded.    BP Readings from Last 3 Encounters:   07/29/25 (!) 73/43   07/23/25 (!) 149/68   07/21/25 135/61        Gen: shivering and having jerky movements  No peripheral edema   HEENT: NC.   Neck: atrumatic  Heart: audible heart sounds, no edema  Lungs: CTAB, slight crackles on " right side   Access: left arm AVF    LABS:  Lab Results   Component Value Date    WBC 8.92 07/29/2025    HGB 10.0 (L) 07/29/2025    HCT 32.0 (L) 07/29/2025     (L) 07/29/2025    CHOL 109 (L) 06/11/2024    CHOL 109 (L) 06/11/2024    TRIG 51 06/11/2024    TRIG 51 06/11/2024    HDL 54 06/11/2024    HDL 54 06/11/2024    ALT 15 07/29/2025    AST 33 07/29/2025     07/29/2025    K 4.7 07/29/2025     07/29/2025    CREATININE 11.6 (H) 07/29/2025    BUN 63 (H) 07/29/2025    CO2 20 (L) 07/29/2025    TSH 1.897 06/07/2025    PSA 0.56 02/06/2024    INR 1.1 07/22/2025    HGBA1C 4.8 06/11/2024      Lab Results   Component Value Date    HGB 10.0 (L) 07/29/2025    HGB 10.6 (L) 07/23/2025    HGB 10.3 (L) 07/22/2025    HGB 13.2 (L) 03/01/2025    HGB 15.1 02/28/2025    HGB 16.0 02/28/2025    FERRITIN 634 (H) 02/20/2024        Lab Results   Component Value Date    CALCIUM 7.2 (L) 07/29/2025    CALCIUM 7.6 (L) 07/23/2025    CALCIUM 8.5 (L) 03/01/2025    CALCIUM 8.5 (L) 02/28/2025    PTH 7.6 (L) 12/20/2023    PTH 39.4 11/14/2023    PTH 58 09/15/2014    PTH 51 05/16/2014        Assessment/Plan:    Ibrahima Phelps 69 y.o. male,  Presented with Sepsis secondary to pneumonia likely,Hypotensive, given midodrine, started on antibiotics     Septic Shock, Source likely pneumonia  Management as per primary team       Diagnosis: ESRD on dialysis   Outpatient HD unit: [facility]  -Nephrologist: Dr Gambino  -HD tx days: TTS  -Last HD tx: missed session on 7/29  -HD access: LUE AV fistula  -HD modality: iHD  -Residual urine: +  -EDW:  90 kg     Recommendation:  No urgent need for RRT, patient hypotensive   Will assess him tomorrow for the need of dialysis   Low potassium, low phosphorous renal diet  Strictly monitor input and outputs   Consent for dialysis taken     Anemia of renal disease - Hb 10  -Trend Hb for goal 9-11 g/dl       Secondary hyperparathyroidism - Phos 4.8 (Phos goal 3.5-5.5)  -Continue home sevelamer  -Trend  Ca/phos    Nutrition   -Renal diet (low phos, low K).   -Goal Albumin is 4mg/dL.    -If patient has poor oral intake, recommend nephro nutritional akhil Matthews MD.  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     Patient is currently retired   Substance and Sexual Activity    Alcohol use: No    Drug use: No    Sexual activity: Yes     Partners: Female   Social History Narrative    Retired      Social Drivers of Health     Financial Resource Strain: Low Risk  (7/21/2025)    Overall Financial Resource Strain (CARDIA)     Difficulty of Paying Living Expenses: Not very hard   Food Insecurity: No Food Insecurity (7/21/2025)    Hunger Vital Sign     Worried About Running Out of Food in the Last Year: Never true     Ran Out of Food in the Last Year: Never true   Transportation Needs: No Transportation Needs (7/21/2025)    PRAPARE - Transportation     Lack of Transportation (Medical): No     Lack of Transportation (Non-Medical): No   Physical Activity: Inactive (7/21/2025)    Exercise Vital Sign     Days of Exercise per Week: 0 days     Minutes of Exercise per Session: 0 min   Stress: No Stress Concern Present (7/21/2025)    [place] [employer] - Occupational Stress Questionnaire     Feeling of Stress : Only a little   Housing Stability: Low Risk  (7/21/2025)    Housing Stability Vital Sign     Unable to Pay for Housing in the Last Year: No     Number of Times  Moved in the Last Year: 0     Homeless in the Last Year: No

## 2025-07-29 NOTE — HPI
Mr. Phelps is a 69-year-old male with history of CAD with PCI in 2006 & 2024 with a angiogram (7/22) showing significant 3 vessel disease, HFpEF, kidney transplant on dialysis, hypertension, atrial fibrillation, COPD (no home O2), pericarditis, and ESRD on T/R/Sa HD who presents with one day of dyspnea, fevers, chills.  Patient reports no sick contacts.  Last underwent routine HD without missing sessions, and due for dialysis on day of presentation.  Patient makes no urine.  Patient states he took reading treatment with mild improvement of symptoms.  Patient reports seeing black spots in his vision while ambulating at home symmetry previous episodes of low blood pressure.      In the ED, BP 82/47, HR 69, afebrile, and on room air.  Labs notable for WBC 8.9, hemoglobin 10, sodium 135, potassium 4.7, calcium 7.1, and per BNP 9716, flu/COVID negative, and lactic acid 2.6.  Chest x-ray with right perihilar and lower lobe infiltrate noted.  Given ceftriaxone and azithromycin in the ED. while in the ED, patient noted to have new onset diffuse intermittent body tremors.

## 2025-07-29 NOTE — ED TRIAGE NOTES
Ibrahima Phelps, a 69 y.o. male, presents to ED via POV with complaints of increased SOB since earlier this AM. Reports feeling unable to take deep inspirations. Reports fever and chills. Denies wearing O2 at home. Hx of COPD. Wife recently dx with COVID. Hx of DM, dialysis. Able to speak in complete sentences.

## 2025-07-29 NOTE — ASSESSMENT & PLAN NOTE
Patient's blood pressure range in the last 24 hours was: BP  Min: 73/43  Max: 117/64.The patient's inpatient anti-hypertensive regimen is listed below:  Current Antihypertensives  torsemide tablet 40 mg, Three times weekly, Oral    Plan  - BP is controlled, no changes needed to their regimen

## 2025-07-29 NOTE — ASSESSMENT & PLAN NOTE
Patient has long standing persistent (>12 months) atrial fibrillation. Patient is currently in sinus rhythm. [id number] Score: 2. The patients heart rate in the last 24 hours is as follows:  Pulse  Min: 64  Max: 89     Antiarrhythmics  amiodarone tablet 200 mg, Daily, Oral    Anticoagulants  apixaban tablet 5 mg, 2 times daily, Oral    Plan  - Replete lytes with a goal of K>4, Mg >2  - Patient is anticoagulated, see medications listed above.  - Patient's afib is currently controlled

## 2025-07-29 NOTE — ASSESSMENT & PLAN NOTE
Patient with known CAD s/p stent placement, which is controlled Will continue Plavix and Statin and monitor for S/Sx of angina/ACS. Continue to monitor on telemetry.

## 2025-07-29 NOTE — ASSESSMENT & PLAN NOTE
Creatine stable for now. BMP reviewed- noted Estimated Creatinine Clearance: 6.7 mL/min (A) (based on SCr of 11.6 mg/dL (H)). according to latest data. Based on current GFR, CKD stage is end stage.  Monitor UOP and serial BMP and adjust therapy as needed. Renally dose meds. Avoid nephrotoxic medications and procedures.    Nephrology consulted for HD

## 2025-07-29 NOTE — ASSESSMENT & PLAN NOTE
Patient presenting with 1 day of dyspnea, fevers, chills, and hypotension concerning for sepsis related to bacterial pneumonia    Ceftriaxone, azithromycin   Follow up blood cultures   Pulmonary toilet

## 2025-07-29 NOTE — ASSESSMENT & PLAN NOTE
Patient with new onset tremor in the ED that he and wife says is new and has not occurred before.    Trialing Ativan  Goal normotension  Consider EEG

## 2025-07-29 NOTE — H&P
[facility] - Emergency Dept  [facility] Medicine  History & Physical    Patient Name: Ibrahima Phelps  MRN: [id number]  Patient Class: IP- Inpatient  Admission Date: 7/29/2025  Attending Physician: Juan Miller MD   Primary Care Provider: Anatoliy Colindres MD         Patient information was obtained from patient, spouse/SO, and ER records.     Subjective:     Principal Problem:SOB (shortness of breath)    Chief Complaint:   Chief Complaint   Patient presents with    Shortness of Breath     Pt. Has SOB since this AM. Hx of copd, no oxygen at home. Pt also reports fever and chills this week. Wife dx with Covid on Monday.        HPI: Mr. Phelps is a 69-year-old male with history of CAD with PCI in 2006 & 2024 with a angiogram (7/22) showing significant 3 vessel disease, HFpEF, kidney transplant on dialysis, hypertension, atrial fibrillation, COPD (no home O2), pericarditis, and ESRD on T/R/Sa HD who presents with one day of dyspnea, fevers, chills.  Patient reports no sick contacts.  Last underwent routine HD without missing sessions, and due for dialysis on day of presentation.  Patient makes no urine.  Patient states he took reading treatment with mild improvement of symptoms.  Patient reports seeing black spots in his vision while ambulating at home symmetry previous episodes of low blood pressure.      In the ED, BP 82/47, HR 69, afebrile, and on room air.  Labs notable for WBC 8.9, hemoglobin 10, sodium 135, potassium 4.7, calcium 7.1, and per BNP 9716, flu/COVID negative, and lactic acid 2.6.  Chest x-ray with right perihilar and lower lobe infiltrate noted.  Given ceftriaxone and azithromycin in the ED. while in the ED, patient noted to have new onset diffuse intermittent body tremors.      Past Medical History:   Diagnosis Date    Atrial fibrillation     CAD (coronary artery disease) 2006    MI in 2006    CHF (congestive heart failure) 2017    CKD (chronic kidney disease) stage 3, GFR 30-59 ml/min       "[place]  in transplanted kidney    CKD (chronic kidney disease) stage 5, GFR less than 15 ml/min 02/02/2024    COVID-19 02/07/2023    Diverticulosis     Hyperlipidemia     Hypertension     Keloid cicatrix     Metabolic bone disease     Other emphysema 10/01/2019    Pericarditis     S/P kidney transplant 1992    x2 (1992 & 2005),    Thrombocytopenia     Thyroid disease     Tobacco use 09/05/2014       Past Surgical History:   Procedure Laterality Date    AV FISTULA PLACEMENT Left 12/18/2023    Procedure: CREATION, AV FISTULA;  Surgeon: JUANI Melendez III, MD;  Location: [facility] OR 2ND FLR;  Service: Vascular;  Laterality: Left;  LUE AVF creation vs AVG insertion    CARDIOVERSION  04/30/15    CHOLECYSTECTOMY      COLONOSCOPY  April 20, 2011    Diverticulosis, repeat recommended in 3 yrs., repeat colonoscopy 2014 revealed 2 polyps.  He should return in 5 years.    COLONOSCOPY N/A 3/13/2020    Procedure: COLONOSCOPY;  Surgeon: Chon Casper MD;  Location: [facility] (4TH FLR);  Service: Endoscopy;  Laterality: N/A;  ok to hold coumadin x5days-see telephone encounter 2/4/20-tb    COLONOSCOPY N/A 2/4/2021    Procedure: COLONOSCOPY;  Surgeon: Chon Casper MD;  Location: [facility] (4TH FLR);  Service: Endoscopy;  Laterality: N/A;  Eliquis - per Dr. GAVIN chua to hold x 2 days and "restarted asap"- ERW  last prep "poor", [id number] ordered/ Pt requests Dr. Casper  prep ins. mailed - COVID screening on 2/1/21 PCW- ERW    COLONOSCOPY N/A 3/3/2021    Procedure: COLONOSCOPY;  Surgeon: Chon Casper MD;  Location: [facility] (4TH FLR);  Service: Endoscopy;  Laterality: N/A;  Patient with his second poor bowel pre and has poor prep today.  If patient not intersted or can't get colonoscopy tomorrow will need constipation bowel prep and will need to restart his Eliquis today.Thanks,Chon Blunt-ok to hold Eliquis 2 days prior      COVID     COLONOSCOPY N/A 12/6/2024    Procedure: COLONOSCOPY;  Surgeon: Pennie, " Chon SPENCER MD;  Location: [facility] ENDO (2ND FLR);  Service: Endoscopy;  Laterality: N/A;  Plavix-Eliquis pending/ HD T-Th-Sat- lab ordered  2/25/24 ECHO PA 49  ref by / [facility] portal-RN  11/29-message to clearance nurse for plavix and eliquis hold-tb  ok to hold Eliquis 2 days per Dr Parmar  ok to hold Plavix 5 days per MEENA Jackson PA-GT  12/2- p    CORONARY ANGIOGRAPHY N/A 2/28/2024    Procedure: ANGIOGRAM, CORONARY ARTERY;  Surgeon: Tylor Lincoln MD;  Location: [facility] CATH LAB;  Service: Cardiology;  Laterality: N/A;    CORONARY ANGIOGRAPHY N/A 7/22/2025    Procedure: ANGIOGRAM, CORONARY ARTERY;  Surgeon: Tylor Lincoln MD;  Location: [facility] CATH LAB;  Service: Cardiology;  Laterality: N/A;    CORONARY ANGIOPLASTY WITH STENT PLACEMENT  9/13/2006    FISTULOGRAM N/A 2/19/2024    Procedure: Fistulogram;  Surgeon: JUANI Melendez III, MD;  Location: [facility] CATH LAB;  Service: Peripheral Vascular;  Laterality: N/A;    FISTULOGRAM, WITH PTA Left 6/3/2024    Procedure: FISTULOGRAM, WITH PTA;  Surgeon: JUANI Melendez III, MD;  [facility];  Service: Vascular;  Laterality: Left;  mGy: 51.58  GyCm2: 6.8285  Fluoro time: 5.1 min  Contrast: 28 cc    FISTULOGRAM, WITH PTA Left 3/5/2025    Procedure: FISTULOGRAM, WITH PTA;  Surgeon: JUANI Melendez III, MD;  [address]  Service: Vascular;  Laterality: Left;    IRRIGATION AND DEBRIDEMENT Right 8/30/2023    Procedure: IRRIGATION AND DEBRIDEMENT, RIGHT MIDDLE FINGER;  Surgeon: Martin Larsen MD;  [address]  Service: Orthopedics;  Laterality: Right;    KIDNEY TRANSPLANT  2005    LEFT HEART CATHETERIZATION N/A 2/26/2024    Procedure: Left heart cath;  Surgeon: Tylor Lincoln MD;  Location: [facility] CATH LAB;  Service: Cardiology;  Laterality: N/A;    PARATHYROIDECTOMY      PERCUTANEOUS TRANSLUMINAL ANGIOPLASTY OF ARTERIOVENOUS FISTULA Left 2/19/2024    Procedure: PTA, AV FISTULA;  Surgeon: JUANI Melendez  III, MD;  Location: [facility] CATH LAB;  Service: Peripheral Vascular;  Laterality: Left;    STENT, DRUG ELUTING, SINGLE VESSEL, CORONARY N/A 2/28/2024    Procedure: Stent, Drug Eluting, Single Vessel, Coronary;  Surgeon: Tylor Lincoln MD;  Location: [facility] CATH LAB;  Service: Cardiology;  Laterality: N/A;    STENT, PERIPHERAL GRAFT Left 3/5/2025    Procedure: STENT, PERIPHERAL GRAFT;  Surgeon: JUANI Melendez III, MD;  [facility]  Service: Vascular;  Laterality: Left;  mGy 63.17  Gycm2 8.3386  flouro 63.17 min  contrast 38 ml    TREATMENT OF CARDIAC ARRHYTHMIA N/A 3/28/2022    Procedure: CARDIOVERSION;  Surgeon: Migue Blunt MD;  Location: [facility] EP LAB;  Service: Cardiology;  Laterality: N/A;  AF, DCC, MAC, GP, 3 PREP*RODRIGO deferred pt 100% compliant*    VENOPLASTY  6/3/2024    Procedure: ANGIOPLASTY, VEIN;  Surgeon: JUANI Melendez III, MD;  [facility];  Service: Vascular;;  Left subclavian       Review of patient's allergies indicates:   Allergen Reactions    Ace inhibitors Swelling    Iodine Itching    Verapamil Other (See Comments)     Other reaction(s): Unknown    Povidone-iodine Itching       No current facility-administered medications on file prior to encounter.     Current Outpatient Medications on File Prior to Encounter   Medication Sig    acetaminophen (TYLENOL) 500 MG tablet Take 1 tablet (500 mg total) by mouth every 6 (six) hours as needed for Pain.    albuterol (VENTOLIN HFA) 90 mcg/actuation inhaler Inhale 2 puffs into the lungs every 6 (six) hours as needed for Wheezing or Shortness of Breath. Rescue    albuterol-ipratropium (DUO-NEB) 2.5 mg-0.5 mg/3 mL nebulizer solution Take 3 mLs by nebulization every 4 (four) hours as needed for Wheezing or Shortness of Breath. Rescue    amiodarone (PACERONE) 200 MG Tab TAKE 1 TABLET BY MOUTH EVERY DAY    atorvastatin (LIPITOR) 80 MG tablet Take 1 tablet (80 mg total) by mouth once daily.    b complex vitamins (B COMPLEX-VITAMIN  B12) tablet Take 1 tablet by mouth once daily.    clopidogreL (PLAVIX) 75 mg tablet Take 1 tablet (75 mg total) by mouth once daily.    ELIQUIS 5 mg Tab TAKE 1 TABLET BY MOUTH TWICE A DAY    famotidine (PEPCID) 20 MG tablet Take 1 tablet (20 mg total) by mouth once daily. On dialysis days, please take this medicine after dialysis. Only taking on Tue, Thur, and  Sat.    fexofenadine HCl (ALLEGRA ORAL) Take 1 tablet by mouth daily as needed (allergies).    fluticasone furoate-vilanteroL (BREO) 100-25 mcg/dose diskus inhaler Inhale 1 puff into the lungs once daily. Controller. Use this inhaler every day.    fluticasone propionate (FLONASE) 50 mcg/actuation nasal spray 1 spray (50 mcg total) by Each Nostril route daily as needed for Allergies.    gabapentin (NEURONTIN) 300 MG capsule Take 1 capsule (300 mg total) by mouth every evening.    midodrine (PROAMATINE) 5 MG Tab Take 1 tablet (5 mg total) by mouth 3 (three) times daily as needed (if blood pressure less than 100/60 mmHg).    multivit-min-FA-lycopen-lutein (CENTRUM SILVER) 0.4 mg-300 mcg- 250 mcg Tab Take 1 tablet by mouth once daily.    nitroGLYCERIN (NITROSTAT) 0.4 MG SL tablet Place 1 tablet (0.4 mg total) under the tongue every 5 (five) minutes as needed for Chest pain.    torsemide (DEMADEX) 20 MG Tab Take 2 tablets (40 mg total) by mouth 3 (three) times a week.    ipratropium (ATROVENT) 21 mcg (0.03 %) nasal spray 2 sprays by Each Nostril route 3 (three) times daily.    LIDOcaine (LIDODERM) 5 % Place 1 patch onto the skin once daily. Remove & Discard patch within 12 hours or as directed by MD    omeprazole (PRILOSEC) 20 MG capsule Take 1 capsule (20 mg total) by mouth daily as needed (heartburn).    sevelamer carbonate (RENVELA) 800 mg Tab This medicine is used to treat your phosphorus level. Please take 1 tablet (800 mg) once a day with the largest meal of the day.     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     Patient is currently retired   Substance and Sexual Activity    Alcohol use: No    Drug use: No    Sexual activity: Yes     Partners: Female     Review of Systems   All other systems reviewed and are negative.    Objective:     Vital Signs (Most Recent):  Temp: 98.5 °F (36.9 °C) (07/29/25 1131)  Pulse: 64 (07/29/25 1733)  Resp: 20 (07/29/25 1128)  BP: (!) 91/54 (07/29/25 1733)  SpO2: 97 % (07/29/25 1733) Vital Signs (24h Range):  Temp:  [98.5 °F (36.9 °C)] 98.5 °F (36.9 °C)  Pulse:  [64-89] 64  Resp:  [20] 20  SpO2:  [93 %-98 %] 97 %  BP: ()/(43-64) 91/54     Weight: 92.5 kg (204 lb)  Body mass index is 26.91 kg/m².     Physical Exam  Constitutional:       Appearance: Normal appearance. He is normal weight. He is ill-appearing.   HENT:      Head: Normocephalic and atraumatic.      Mouth/Throat:      Mouth: Mucous membranes are moist.   Eyes:      Extraocular Movements: Extraocular movements intact.      Conjunctiva/sclera: Conjunctivae normal.   Cardiovascular:      Rate and Rhythm: Normal rate and regular rhythm.      Heart sounds: No murmur heard.  Pulmonary:      Effort: Pulmonary effort is normal. No respiratory distress.      Breath sounds: Normal breath sounds. No wheezing or rales.   Abdominal:      General: Abdomen is flat. There is no distension.      Palpations: Abdomen is soft.      Tenderness: There is no abdominal tenderness. There is no guarding.   Musculoskeletal:         General: No swelling  "or tenderness.   Skin:     Findings: No rash.   Neurological:      General: No focal deficit present.      Mental Status: He is oriented to person, place, and time. Mental status is at baseline.      Motor: Tremor (Periodic intermittent whole-body tremor) present.   Psychiatric:         Mood and Affect: Mood normal.         Behavior: Behavior normal.                Significant Labs: All pertinent labs within the past 24 hours have been reviewed.  CBC:   Recent Labs   Lab 07/29/25  1301 07/29/25  1641   WBC 8.92  --    HGB 10.0*  --    HCT 32.0* 33.3   *  --      CMP:   Recent Labs   Lab 07/29/25  1301 07/29/25  1635    135*   K 4.7 4.7    104   CO2 20* 19*   GLU 82 78   BUN 63* 67*   CREATININE 11.6* 11.6*   CALCIUM 7.2* 7.1*   PROT 6.2  --    ALBUMIN 2.8* 2.6*   BILITOT 0.4  --    ALKPHOS 95  --    AST 33  --    ALT 15  --    ANIONGAP 15 12     Cardiac Markers:   Recent Labs   Lab 07/29/25  1301   BNP 9,716*     Lactic Acid: No results for input(s): "LACTATE" in the last 48 hours.    Significant Imaging: I have reviewed all pertinent imaging results/findings within the past 24 hours.  Assessment/Plan:     Assessment & Plan  SOB (shortness of breath)  Community acquired bacterial pneumonia  Patient presenting with 1 day of dyspnea, fevers, chills, and hypotension concerning for sepsis related to bacterial pneumonia    Ceftriaxone, azithromycin   Follow up blood cultures   Pulmonary toilet    Primary hypertension  Patient's blood pressure range in the last 24 hours was: BP  Min: 73/43  Max: 117/64.The patient's inpatient anti-hypertensive regimen is listed below:  Current Antihypertensives  torsemide tablet 40 mg, Three times weekly, Oral    Plan  - BP is controlled, no changes needed to their regimen    Postural tremor  Patient with new onset tremor in the ED that he and wife says is new and has not occurred before.    Trialing Ativan  Goal normotension  Consider EEG    Stented coronary " artery  Mixed hyperlipidemia  Patient with known CAD s/p stent placement, which is controlled Will continue Plavix and Statin and monitor for S/Sx of angina/ACS. Continue to monitor on telemetry.       ESRD (end stage renal disease)  Creatine stable for now. BMP reviewed- noted Estimated Creatinine Clearance: 6.7 mL/min (A) (based on SCr of 11.6 mg/dL (H)). according to latest data. Based on current GFR, CKD stage is end stage.  Monitor UOP and serial BMP and adjust therapy as needed. Renally dose meds. Avoid nephrotoxic medications and procedures.    Nephrology consulted for HD    GERD (gastroesophageal reflux disease)  Continue home medication    Anemia in chronic kidney disease  Anemia is likely due to chronic disease due to ESRD. Most recent hemoglobin and hematocrit are listed below.  Recent Labs     07/29/25  1301 07/29/25  1641   HGB 10.0*  --    HCT 32.0* 33.3     Plan  - Monitor serial CBC: Daily  - Transfuse PRBC if patient becomes hemodynamically unstable, symptomatic or H/H drops below 7/21.  - Patient has not received any PRBC transfusions to date  - Patient's anemia is currently stable    Hemodialysis-associated hypotension  As needed midodrine    Paroxysmal atrial fibrillation  Patient has long standing persistent (>12 months) atrial fibrillation. Patient is currently in sinus rhythm. [id number] Score: 2. The patients heart rate in the last 24 hours is as follows:  Pulse  Min: 64  Max: 89     Antiarrhythmics  amiodarone tablet 200 mg, Daily, Oral    Anticoagulants  apixaban tablet 5 mg, 2 times daily, Oral    Plan  - Replete lytes with a goal of K>4, Mg >2  - Patient is anticoagulated, see medications listed above.  - Patient's afib is currently controlled    VTE Risk Mitigation (From admission, onward)           Ordered     apixaban tablet 5 mg  2 times daily         07/29/25 1740     IP VTE HIGH RISK PATIENT  Once         07/29/25 1531     Place sequential compression device  Until discontinued          07/29/25 1531                                                Juan Miller MD  Department of Hospital Medicine  [facility] - Emergency Dept

## 2025-07-29 NOTE — ED PROVIDER NOTES
Encounter Date: 7/29/2025       History     Chief Complaint   Patient presents with    Shortness of Breath     Pt. Has SOB since this AM. Hx of copd, no oxygen at home. Pt also reports fever and chills this week. Wife dx with Covid on Monday.     HPI Ibrahima Phelps is a 69M with a PMHx of COPD (no home O2), CAD with PCI in 2006 & 2024 with a angiogram (7/22) showing significant 3 vessel disease, paroxysmal afib, HFpEF, kidney transplant on dialysis, two hospitalizations in June for pneumonia who presents to the ED for SOB. His wife had a positive COVID test yesterday. At 6am this morning he started having fevers, chills, and found it hard to catch his breath. He had a breathing treatment at 7am which helped mildly. He had one moment of chest pain this morning but it went away quickly. When he was walking around this morning he saw black spots and compared it to when his pressures get low with dialysis. He feels as though he is fluid overloaded and occasionally hears wheezing when he breaths. He gets dialysis 3 days (T, Th, S) and is due for it today. He no longer makes urine. He denies n/v.   Review of patient's allergies indicates:   Allergen Reactions    Ace inhibitors Swelling    Iodine Itching    Verapamil Other (See Comments)     Other reaction(s): Unknown    Povidone-iodine Itching     Past Medical History:   Diagnosis Date    Atrial fibrillation     CAD (coronary artery disease) 2006    MI in 2006    CHF (congestive heart failure) 2017    CKD (chronic kidney disease) stage 3, GFR 30-59 ml/min     Dr. Latif.  in transplanted kidney    CKD (chronic kidney disease) stage 5, GFR less than 15 ml/min 02/02/2024    COVID-19 02/07/2023    Diverticulosis     Hyperlipidemia     Hypertension     Keloid cicatrix     Metabolic bone disease     Other emphysema 10/01/2019    Pericarditis     S/P kidney transplant 1992    x2 (1992 & 2005),    Thrombocytopenia     Thyroid disease     Tobacco use 09/05/2014     Past Surgical  "History:   Procedure Laterality Date    AV FISTULA PLACEMENT Left 12/18/2023    Procedure: CREATION, AV FISTULA;  Surgeon: JUANI Melendez III, MD;  Location: [facility] OR 2ND FLR;  Service: Vascular;  Laterality: Left;  LUE AVF creation vs AVG insertion    CARDIOVERSION  04/30/15    CHOLECYSTECTOMY      COLONOSCOPY  April 20, 2011    Diverticulosis, repeat recommended in 3 yrs., repeat colonoscopy 2014 revealed 2 polyps.  He should return in 5 years.    COLONOSCOPY N/A 3/13/2020    Procedure: COLONOSCOPY;  Surgeon: Chon Casper MD;  Location: [facility] (4TH FLR);  Service: Endoscopy;  Laterality: N/A;  ok to hold coumadin x5days-see telephone encounter 2/4/20-tb    COLONOSCOPY N/A 2/4/2021    Procedure: COLONOSCOPY;  Surgeon: Chon Casper MD;  Location: [facility] (4TH FLR);  Service: Endoscopy;  Laterality: N/A;  Eliquis - per Dr. GAVIN Blunt ok to hold x 2 days and "restarted asap"- ERW  last prep "poor", [id number] ordered/ Pt requests Dr. Casper  prep ins. mailed - COVID screening on 2/1/21 PCW- ERW    COLONOSCOPY N/A 3/3/2021    Procedure: COLONOSCOPY;  Surgeon: Chon Casper MD;  Location: [facility] (4TH FLR);  Service: Endoscopy;  Laterality: N/A;  Patient with his second poor bowel pre and has poor prep today.  If patient not intersted or can't get colonoscopy tomorrow will need constipation bowel prep and will need to restart his Eliquis today.Thanks,Chon     per Dr Blunt-ok to hold Eliquis 2 days prior      COVID     COLONOSCOPY N/A 12/6/2024    Procedure: COLONOSCOPY;  Surgeon: Chon Casper MD;  Location: [facility] (2ND FLR);  Service: Endoscopy;  Laterality: N/A;  Plavix-Eliquis pending/ HD T-Th-Sat- lab ordered  2/25/24 ECHO PA 49  ref by / [facility] portal-RN  11/29-message to clearance nurse for plavix and eliquis hold-tb  ok to hold Eliquis 2 days per Dr Parmar  ok to hold Plavix 5 days per MEENA SUTTON  12/2- p    CORONARY ANGIOGRAPHY N/A 2/28/2024    Procedure: " ANGIOGRAM, CORONARY ARTERY;  Surgeon: Tylor Lincoln MD;  Location: [facility] CATH LAB;  Service: Cardiology;  Laterality: N/A;    CORONARY ANGIOGRAPHY N/A 7/22/2025    Procedure: ANGIOGRAM, CORONARY ARTERY;  Surgeon: Tylor Lincoln MD;  Location: [facility] CATH LAB;  Service: Cardiology;  Laterality: N/A;    CORONARY ANGIOPLASTY WITH STENT PLACEMENT  9/13/2006    FISTULOGRAM N/A 2/19/2024    Procedure: Fistulogram;  Surgeon: JUANI Melendez III, MD;  Location: [facility] CATH LAB;  Service: Peripheral Vascular;  Laterality: N/A;    FISTULOGRAM, WITH PTA Left 6/3/2024    Procedure: FISTULOGRAM, WITH PTA;  Surgeon: JUANI Melendez III, MD;  Location: [facility] OR 2ND FLR;  Service: Vascular;  Laterality: Left;  mGy: 51.58  GyCm2: 6.8285  Fluoro time: 5.1 min  Contrast: 28 cc    FISTULOGRAM, WITH PTA Left 3/5/2025    Procedure: FISTULOGRAM, WITH PTA;  Surgeon: JUANI Mleendez III, MD;  Location: [facility] OR 2ND FLR;  Service: Vascular;  Laterality: Left;    IRRIGATION AND DEBRIDEMENT Right 8/30/2023    Procedure: IRRIGATION AND DEBRIDEMENT, RIGHT MIDDLE FINGER;  Surgeon: Martin Larsen MD;  Location: [facility] OR 2ND FLR;  Service: Orthopedics;  Laterality: Right;    KIDNEY TRANSPLANT  2005    LEFT HEART CATHETERIZATION N/A 2/26/2024    Procedure: Left heart cath;  Surgeon: Tylor Lincoln MD;  Location: [facility] CATH LAB;  Service: Cardiology;  Laterality: N/A;    PARATHYROIDECTOMY      PERCUTANEOUS TRANSLUMINAL ANGIOPLASTY OF ARTERIOVENOUS FISTULA Left 2/19/2024    Procedure: PTA, AV FISTULA;  Surgeon: JUANI Melendez III, MD;  Location: [facility] CATH LAB;  Service: Peripheral Vascular;  Laterality: Left;    STENT, DRUG ELUTING, SINGLE VESSEL, CORONARY N/A 2/28/2024    Procedure: Stent, Drug Eluting, Single Vessel, Coronary;  Surgeon: Tylor Lincoln MD;  Location: [facility] CATH LAB;  Service: Cardiology;  Laterality: N/A;    STENT, PERIPHERAL GRAFT Left 3/5/2025    Procedure: STENT, PERIPHERAL GRAFT;  Surgeon: JUANI Melendez  III, MD;  [facility];  Service: Vascular;  Laterality: Left;  mGy 63.17  Gycm2 8.3386  flouro 63.17 min  contrast 38 ml    TREATMENT OF CARDIAC ARRHYTHMIA N/A 3/28/2022    Procedure: CARDIOVERSION;  Surgeon: Migue Blunt MD;  Location: [facility] EP LAB;  Service: Cardiology;  Laterality: N/A;  AF, DCC, MAC, GP, 3 PREP*RODRIGO deferred pt 100% compliant*    VENOPLASTY  6/3/2024    Procedure: ANGIOPLASTY, VEIN;  Surgeon: JUANI Melendez III, MD;  [facility];  Service: Vascular;;  Left subclavian     Family History   Problem Relation Name Age of Onset    No Known Problems Sister      No Known Problems Brother      Thyroid disease Mother          s/p surgery    Heart disease Father          had pacemaker    No Known Problems Sister      Kidney failure Sister      Kidney disease Sister      No Known Problems Sister      Kidney disease Brother      Kidney failure Brother          s/p transplant    Diabetes Mellitus Neg Hx      Stroke Neg Hx      Heart attack Neg Hx      Cancer Neg Hx      Celiac disease Neg Hx      Cirrhosis Neg Hx      Colon cancer Neg Hx      Esophageal cancer Neg Hx      Inflammatory bowel disease Neg Hx      Rectal cancer Neg Hx      Stomach cancer Neg Hx      Ulcerative colitis Neg Hx      Liver disease Neg Hx      Liver cancer Neg Hx      Crohn's disease Neg Hx      Melanoma Neg Hx       Social History[1]      Physical Exam     Initial Vitals   BP Pulse Resp Temp SpO2   07/29/25 1128 07/29/25 1128 07/29/25 1128 07/29/25 1131 07/29/25 1128   117/64 89 20 98.5 °F (36.9 °C) 98 %      MAP       --                Physical Exam     Nursing note and vitals reviewed.  Constitutional: Patient appears well-developed and well-nourished.  Patient appears uncomfortable  HENT:   Head: Normocephalic and atraumatic.   Eyes: Conjunctivae and EOM are normal. Pupils are equal, round, and reactive to light.   Neck: Neck supple.   Normal range of motion.  Cardiovascular: Normal rate, regular  rhythm, normal heart sounds and intact distal pulses.   Pulmonary/Chest: Breath sounds normal, no wheezes noted  Abdominal: Abdomen is soft. Patient exhibits no distension. There is no abdominal tenderness.   Musculoskeletal:      Cervical back: Normal range of motion and neck supple. + palpable thrill along left upper extremity AV fistula  Neurological: Patient is alert and oriented to person, place, and time. No cranial nerve deficit. GCS score is 15.    Skin: Skin is warm and dry.  Psych: Normal mood/affect    ED Course   Procedures  Labs Reviewed   COMPREHENSIVE METABOLIC PANEL - Abnormal       Result Value    Sodium 137      Potassium 4.7      Chloride 102      CO2 20 (*)     Glucose 82      BUN 63 (*)     Creatinine 11.6 (*)     Calcium 7.2 (*)     Protein Total 6.2      Albumin 2.8 (*)     Bilirubin Total 0.4      ALP 95      AST 33      ALT 15      Anion Gap 15      eGFR 4 (*)    TROPONIN I HIGH SENSITIVITY - Abnormal    Troponin High Sensitive 151 (*)    NT-PRO NATRIURETIC PEPTIDE - Abnormal    NT-proBNP 9,716 (*)     Narrative:     NOTE:  Access complete set of age - and/or gender-specific reference intervals for this test in the [facility] Collection Manual.   CBC WITH DIFFERENTIAL - Abnormal    WBC 8.92      RBC 3.72 (*)     HGB 10.0 (*)     HCT 32.0 (*)     MCV 86      MCH 26.9 (*)     MCHC 31.3 (*)     RDW 17.3 (*)     Platelet Count 123 (*)     MPV 12.1      Nucleated RBC 0      Neut % 80.2 (*)     Lymph % 9.4 (*)     Mono % 7.0      Eos % 2.2      Basophil % 0.1      Imm Grans % 1.1 (*)     Neut # 7.15      Lymph # 0.84 (*)     Mono # 0.62      Eos # 0.20      Baso # 0.01      Imm Grans # 0.10 (*)    INFLUENZA A & B BY MOLECULAR - Normal    INFLUENZA A MOLECULAR Negative      INFLUENZA B MOLECULAR  Negative     SARS-COV-2 RNA AMPLIFICATION, QUAL - Normal    SARS COV-2 Molecular Negative     CULTURE, BLOOD   CULTURE, BLOOD   CBC W/ AUTO DIFFERENTIAL    Narrative:     The following orders were  created for panel order CBC auto differential.  Procedure                               Abnormality         Status                     ---------                               -----------         ------                     CBC with Differential[[id number]]       Abnormal            Final result                 Please view results for these tests on the individual orders.   TOXICOLOGY SCREEN, URINE, RANDOM (COMPLIANCE)   URINALYSIS, REFLEX TO URINE CULTURE   RENAL FUNCTION PANEL   CALCIUM, IONIZED        ECG Results              EKG 12-lead (Final result)        Collection Time Result Time QRS Duration [facility] QTC Calculation    07/29/25 11:36:29 07/29/25 14:00:05 104 438                     Final result by Interface, Lab In [facility] (07/29/25 14:00:12)                   Narrative:    Test Reason : R06.02,    Vent. Rate :  88 BPM     Atrial Rate :  88 BPM     P-R Int : 186 ms          QRS Dur : 104 ms      QT Int : 362 ms       P-R-T Axes :  78  31 104 degrees    QTcB Int : 438 ms    Normal sinus rhythm  ST and T wave abnormality, consider lateral ischemia  Abnormal ECG  When compared with ECG of 21-Jul-2025 20:03,  Premature supraventricular complexes are no longer Present  MT interval has decreased  ST no longer depressed in Inferior leads  ST no longer depressed in Anterior leads  T wave inversion no longer evident in Inferior leads  T wave inversion less evident in Lateral leads  Confirmed by Bee Lassiter ([id number]) on 7/29/2025 2:00:01 PM    Referred By:            Confirmed By: Bee Lassiter                                  Imaging Results              X-Ray Chest AP Portable (Final result)  Result time 07/29/25 13:39:43      Final result by Wilberto Yang III, MD (07/29/25 13:39:43)                   Impression:      Right-sided pneumonia.      Electronically signed by: Wilberto Yang MD  Date:    07/29/2025  Time:    13:39               Narrative:    EXAMINATION:  XR CHEST AP PORTABLE    CLINICAL  HISTORY:  CHF;    FINDINGS:  Chest one view portable.    Heart size is normal.  There is right perihilar, and lower lobe infiltrate.  Left lung is clear.  Bones show nothing focal.                                       Medications   sodium chloride 0.9% flush 10 mL (has no administration in time range)   melatonin tablet 6 mg (has no administration in time range)   ondansetron disintegrating tablet 8 mg (has no administration in time range)   prochlorperazine injection Soln 5 mg (has no administration in time range)   polyethylene glycol packet 17 g (has no administration in time range)   bisacodyL suppository 10 mg (has no administration in time range)   acetaminophen tablet 650 mg (has no administration in time range)   naloxone 0.4 mg/mL injection 0.02 mg (has no administration in time range)   midodrine tablet 5 mg (5 mg Oral Given 7/29/25 1318)   cefTRIAXone injection 1 g (1 g Intravenous Given 7/29/25 1449)   azithromycin (ZITHROMAX) 500 mg in 0.9% NaCl 250 mL IVPB (admixture device) (500 mg Intravenous New Bag 7/29/25 1458)   acetaminophen tablet 650 mg (650 mg Oral Given 7/29/25 1623)   hydrOXYzine pamoate capsule 25 mg (25 mg Oral Given 7/29/25 1623)   0.9% NaCl infusion (500 mLs Intravenous New Bag 7/29/25 1642)     Medical Decision Making  Amount and/or Complexity of Data Reviewed  Labs: ordered.  Radiology: ordered.    Risk  OTC drugs.  Prescription drug management.  Decision regarding hospitalization.                      I have personally reviewed and interpreted the patients laboratory studies:  Influenza and COVID negative, 7.3/41/21, lactate 2.6, high sensitivity troponin 151, BNP 9 716, mild anemia with hemoglobin 10, no leukocytosis, CMP with potassium 4.7  I have personally reviewed and interpreted imaging studies: CXR + R sided infiltrate, no clear pulm edema  I have personally reviewed and interpreted the patient's EKG:  Normal sinus rhythm at 88 beats per minute, , , Q-wave in  III, RSR prime in AVF, T-wave inversions in V4, V5, V6 similar to previous      I have also reviewed the following:   external records:  EKG from 07/21/2025 with similar and more pronounced T-wave inversions in lateral leads to current, deep T-wave inversions at that time in inferior leads  Echocardiogram from 07/21/2025 with EF of 55-60% and aortic stenosis             Critical Care   Date: 07/29/2025  Performed by: Veronika Rothman MD   Authorized by: Veronika Rothman MD      Total critical care time (exclusive of procedural time) : 35 minutes, exclusive of separately billable procedures and treating other patients and teaching time.    Critical care was necessary to treat or prevent imminent or life-threatening deterioration of the following conditions:  sepsis    Due to a high probability of clinically significant, life threatening deterioration, the patient required my highest level of preparedness to intervene emergently and I personally spent this critical care time directly and personally managing the patient. This critical care time included obtaining a history; examining the patient; pulse oximetry; ordering and review of studies; arranging urgent treatment with development of a management plan; evaluation of patient's response to treatment; frequent reassessment, documentation, and, discussions with other providers, patient and family members.    Ibrahima Phelps presents with sepsis without organ dysfunction secondary to Respiratory Infection.    Interventions include:    Antibiotics:         Fluid Resuscitation:   Contraindicated- Fluid bolus is contraindicated in this patient due to End Stage Renal Disease and Congestive Heart Failure     Labs and Imaging:   Recent Labs   Lab 06/07/25  1541 06/07/25  1548 06/08/25  0041 07/21/25  0917 07/29/25  1308   POCLAC  --  2.0  --  1.3 2.6*   LACTATE 2.0  --  1.1  --   --      Blood Culture   Date Value Ref Range Status   06/07/2025 No Growth After 5 Days  Final   06/07/2025  No Growth After 5 Days  Final   05/20/2025 No Growth After 5 Days  Final   05/20/2025 No Growth After 5 Days  Final      Additional cultures were collected as indicated and imaging reviewed to identify infection source.    Hemodynamic Support and Monitoring:  Vasopressors were not needed     The patient was re-evaluated at Current time:  4:45 PM and patient and/or surrogate was updated on plan of care.    The following services were consulted:None.    Patient initially arrived to the emergency department with complaint of shortness of breath after having family member recently diagnosed with the COVID, missing dialysis this morning.     Initial concern was not for sepsis, however the patient was noted to have which room pneumonia on chest x-ray.     After antibiotics and observation, the patient was noted to have rigors in the emergency department with mild hypotension and felt febrile.  Patient declined rectal temperature however oral temperature remained afebrile.    Bedside ultrasound was attempted, unable to fully visualize IVC, however limited visualization showed a collapsible IVC and thus patient received 500 cc of normal saline, blood cultures were added and lactate.    Patient was admitted to  for further treatment and evaluation.        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     Patient is currently retired   Substance Use Topics    Alcohol use: No    Drug use: Veronika Blanco MD  07/29/25  5803

## 2025-07-29 NOTE — SUBJECTIVE & OBJECTIVE
"Past Medical History:   Diagnosis Date    Atrial fibrillation     CAD (coronary artery disease) 2006    MI in 2006    CHF (congestive heart failure) 2017    CKD (chronic kidney disease) stage 3, GFR 30-59 ml/min     Dr. Latif.  in transplanted kidney    CKD (chronic kidney disease) stage 5, GFR less than 15 ml/min 02/02/2024    COVID-19 02/07/2023    Diverticulosis     Hyperlipidemia     Hypertension     Keloid cicatrix     Metabolic bone disease     Other emphysema 10/01/2019    Pericarditis     S/P kidney transplant 1992    x2 (1992 & 2005),    Thrombocytopenia     Thyroid disease     Tobacco use 09/05/2014       Past Surgical History:   Procedure Laterality Date    AV FISTULA PLACEMENT Left 12/18/2023    Procedure: CREATION, AV FISTULA;  Surgeon: JUANI Melendez III, MD;  [facility];  Service: Vascular;  Laterality: Left;  LUE AVF creation vs AVG insertion    CARDIOVERSION  04/30/15    CHOLECYSTECTOMY      COLONOSCOPY  April 20, 2011    Diverticulosis, repeat recommended in 3 yrs., repeat colonoscopy 2014 revealed 2 polyps.  He should return in 5 years.    COLONOSCOPY N/A 3/13/2020    Procedure: COLONOSCOPY;  Surgeon: Chon Casper MD;  Location: [facility] (4TH FLR);  Service: Endoscopy;  Laterality: N/A;  ok to hold coumadin x5days-see telephone encounter 2/4/20-tb    COLONOSCOPY N/A 2/4/2021    Procedure: COLONOSCOPY;  Surgeon: Chon Casper MD;  Location: [facility] (4TH FLR);  Service: Endoscopy;  Laterality: N/A;  Eliquis - per Dr. GAVIN Blunt ok to hold x 2 days and "restarted asap"- ERW  last prep "poor", [id number] ordered/ Pt requests Dr. Casper  prep ins. mailed - COVID screening on 2/1/21 PCW- ERW    COLONOSCOPY N/A 3/3/2021    Procedure: COLONOSCOPY;  Surgeon: Chon Casper MD;  Location: [facility] (4TH FLR);  Service: Endoscopy;  Laterality: N/A;  Patient with his second poor bowel pre and has poor prep today.  If patient not intersted or can't get colonoscopy tomorrow will " need constipation bowel prep and will need to restart his Eliquis today.Thanks,Chon     per Dr Blunt-ok to hold Eliquis 2 days prior      COVID     COLONOSCOPY N/A 12/6/2024    Procedure: COLONOSCOPY;  Surgeon: Chon Casper MD;  Location: [facility] (2ND FLR);  Service: Endoscopy;  Laterality: N/A;  Plavix-Eliquis pending/ HD T-Th-Sat- lab ordered  2/25/24 ECHO PA 49  ref by / [facility] portal-RN  11/29-message to clearance nurse for plavix and eliquis hold-tb  ok to hold Eliquis 2 days per Dr Parmar  ok to hold Plavix 5 days per MEENA COLVINGT  12/2- p    CORONARY ANGIOGRAPHY N/A 2/28/2024    Procedure: ANGIOGRAM, CORONARY ARTERY;  Surgeon: Tylor Lincoln MD;  Location: [facility] CATH LAB;  Service: Cardiology;  Laterality: N/A;    CORONARY ANGIOGRAPHY N/A 7/22/2025    Procedure: ANGIOGRAM, CORONARY ARTERY;  Surgeon: Tylor Lincoln MD;  Location: [facility] CATH LAB;  Service: Cardiology;  Laterality: N/A;    CORONARY ANGIOPLASTY WITH STENT PLACEMENT  9/13/2006    FISTULOGRAM N/A 2/19/2024    Procedure: Fistulogram;  Surgeon: JUANI Melendez III, MD;  Location: [facility] CATH LAB;  Service: Peripheral Vascular;  Laterality: N/A;    FISTULOGRAM, WITH PTA Left 6/3/2024    Procedure: FISTULOGRAM, WITH PTA;  Surgeon: JUANI Melendez III, MD;  [facility];  Service: Vascular;  Laterality: Left;  mGy: 51.58  GyCm2: 6.8285  Fluoro time: 5.1 min  Contrast: 28 cc    FISTULOGRAM, WITH PTA Left 3/5/2025    Procedure: FISTULOGRAM, WITH PTA;  Surgeon: JUANI Melendez III, MD;  [facility];  Service: Vascular;  Laterality: Left;    IRRIGATION AND DEBRIDEMENT Right 8/30/2023    Procedure: IRRIGATION AND DEBRIDEMENT, RIGHT MIDDLE FINGER;  Surgeon: Martin Larsen MD;  [address]  Service: Orthopedics;  Laterality: Right;    KIDNEY TRANSPLANT  2005    LEFT HEART CATHETERIZATION N/A 2/26/2024    Procedure: Left heart cath;  Surgeon: Tylor Lincoln MD;  Location:  [facility] CATH LAB;  Service: Cardiology;  Laterality: N/A;    PARATHYROIDECTOMY      PERCUTANEOUS TRANSLUMINAL ANGIOPLASTY OF ARTERIOVENOUS FISTULA Left 2/19/2024    Procedure: PTA, AV FISTULA;  Surgeon: JUANI Melendez III, MD;  Location: [facility] CATH LAB;  Service: Peripheral Vascular;  Laterality: Left;    STENT, DRUG ELUTING, SINGLE VESSEL, CORONARY N/A 2/28/2024    Procedure: Stent, Drug Eluting, Single Vessel, Coronary;  Surgeon: Tylor Lincoln MD;  Location: [facility] CATH LAB;  Service: Cardiology;  Laterality: N/A;    STENT, PERIPHERAL GRAFT Left 3/5/2025    Procedure: STENT, PERIPHERAL GRAFT;  Surgeon: JUANI Melendez III, MD;  Location: [facility] OR [facility] FLR;  Service: Vascular;  Laterality: Left;  mGy 63.17  Gycm2 8.3386  flouro 63.17 min  contrast 38 ml    TREATMENT OF CARDIAC ARRHYTHMIA N/A 3/28/2022    Procedure: CARDIOVERSION;  Surgeon: Migue Blunt MD;  Location: [facility] EP LAB;  Service: Cardiology;  Laterality: N/A;  AF, DCC, MAC, GP, 3 PREP*RODRIGO deferred pt 100% compliant*    VENOPLASTY  6/3/2024    Procedure: ANGIOPLASTY, VEIN;  Surgeon: JUANI Melendez III, MD;  Location: [facility] OR [facility] FLR;  Service: Vascular;;  Left subclavian       Review of patient's allergies indicates:   Allergen Reactions    Ace inhibitors Swelling    Iodine Itching    Verapamil Other (See Comments)     Other reaction(s): Unknown    Povidone-iodine Itching       No current facility-administered medications on file prior to encounter.     Current Outpatient Medications on File Prior to Encounter   Medication Sig    acetaminophen (TYLENOL) 500 MG tablet Take 1 tablet (500 mg total) by mouth every 6 (six) hours as needed for Pain.    albuterol (VENTOLIN HFA) 90 mcg/actuation inhaler Inhale 2 puffs into the lungs every 6 (six) hours as needed for Wheezing or Shortness of Breath. Rescue    albuterol-ipratropium (DUO-NEB) 2.5 mg-0.5 mg/3 mL nebulizer solution Take 3 mLs by nebulization every 4 (four) hours as needed for Wheezing or Shortness  of Breath. Rescue    amiodarone (PACERONE) 200 MG Tab TAKE 1 TABLET BY MOUTH EVERY DAY    atorvastatin (LIPITOR) 80 MG tablet Take 1 tablet (80 mg total) by mouth once daily.    b complex vitamins (B COMPLEX-VITAMIN B12) tablet Take 1 tablet by mouth once daily.    clopidogreL (PLAVIX) 75 mg tablet Take 1 tablet (75 mg total) by mouth once daily.    ELIQUIS 5 mg Tab TAKE 1 TABLET BY MOUTH TWICE A DAY    famotidine (PEPCID) 20 MG tablet Take 1 tablet (20 mg total) by mouth once daily. On dialysis days, please take this medicine after dialysis. Only taking on Tue, Thur, and  Sat.    fexofenadine HCl (ALLEGRA ORAL) Take 1 tablet by mouth daily as needed (allergies).    fluticasone furoate-vilanteroL (BREO) 100-25 mcg/dose diskus inhaler Inhale 1 puff into the lungs once daily. Controller. Use this inhaler every day.    fluticasone propionate (FLONASE) 50 mcg/actuation nasal spray 1 spray (50 mcg total) by Each Nostril route daily as needed for Allergies.    gabapentin (NEURONTIN) 300 MG capsule Take 1 capsule (300 mg total) by mouth every evening.    midodrine (PROAMATINE) 5 MG Tab Take 1 tablet (5 mg total) by mouth 3 (three) times daily as needed (if blood pressure less than 100/60 mmHg).    multivit-min-FA-lycopen-lutein (CENTRUM SILVER) 0.4 mg-300 mcg- 250 mcg Tab Take 1 tablet by mouth once daily.    nitroGLYCERIN (NITROSTAT) 0.4 MG SL tablet Place 1 tablet (0.4 mg total) under the tongue every 5 (five) minutes as needed for Chest pain.    torsemide (DEMADEX) 20 MG Tab Take 2 tablets (40 mg total) by mouth 3 (three) times a week.    ipratropium (ATROVENT) 21 mcg (0.03 %) nasal spray 2 sprays by Each Nostril route 3 (three) times daily.    LIDOcaine (LIDODERM) 5 % Place 1 patch onto the skin once daily. Remove & Discard patch within 12 hours or as directed by MD    omeprazole (PRILOSEC) 20 MG capsule Take 1 capsule (20 mg total) by mouth daily as needed (heartburn).    sevelamer carbonate (RENVELA) 800 mg Tab This  medicine is used to treat your phosphorus level. Please take 1 tablet (800 mg) once a day with the largest meal of the day.     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     Patient is currently retired   Substance and Sexual Activity    Alcohol use: No    Drug use: No    Sexual activity: Yes     Partners: Female     Review of Systems   All other systems reviewed and are negative.    Objective:     Vital Signs (Most Recent):  Temp: 98.5 °F (36.9 °C) (07/29/25 1131)  Pulse: 64 (07/29/25 1733)  Resp: 20 (07/29/25 1128)  BP: (!) 91/54 (07/29/25 1733)  SpO2: 97 % (07/29/25 1733) Vital Signs (24h Range):  Temp:  [98.5 °F (36.9 °C)] 98.5 °F (36.9 °C)  Pulse:  [64-89] 64  Resp:  [20] 20  SpO2:  [93 %-98 %] 97 %  BP: ()/(43-64) 91/54     Weight: 92.5 kg (204 lb)  Body mass index is 26.91 kg/m².     Physical Exam  Constitutional:       Appearance: Normal appearance. He is normal weight. He is ill-appearing.   HENT:      Head: Normocephalic and atraumatic.      Mouth/Throat:      Mouth: Mucous membranes are moist.   Eyes:      Extraocular Movements: Extraocular movements intact.      Conjunctiva/sclera: Conjunctivae normal.   Cardiovascular:      Rate and Rhythm: Normal rate and regular rhythm.      Heart sounds: No murmur heard.  Pulmonary:      Effort: Pulmonary effort is normal. No respiratory distress.      Breath sounds: Normal breath sounds. No wheezing or rales.  "  Abdominal:      General: Abdomen is flat. There is no distension.      Palpations: Abdomen is soft.      Tenderness: There is no abdominal tenderness. There is no guarding.   Musculoskeletal:         General: No swelling or tenderness.   Skin:     Findings: No rash.   Neurological:      General: No focal deficit present.      Mental Status: He is oriented to person, place, and time. Mental status is at baseline.      Motor: Tremor (Periodic intermittent whole-body tremor) present.   Psychiatric:         Mood and Affect: Mood normal.         Behavior: Behavior normal.                Significant Labs: All pertinent labs within the past 24 hours have been reviewed.  CBC:   Recent Labs   Lab 07/29/25  1301 07/29/25  1641   WBC 8.92  --    HGB 10.0*  --    HCT 32.0* 33.3   *  --      CMP:   Recent Labs   Lab 07/29/25  1301 07/29/25  1635    135*   K 4.7 4.7    104   CO2 20* 19*   GLU 82 78   BUN 63* 67*   CREATININE 11.6* 11.6*   CALCIUM 7.2* 7.1*   PROT 6.2  --    ALBUMIN 2.8* 2.6*   BILITOT 0.4  --    ALKPHOS 95  --    AST 33  --    ALT 15  --    ANIONGAP 15 12     Cardiac Markers:   Recent Labs   Lab 07/29/25  1301   BNP 9,716*     Lactic Acid: No results for input(s): "LACTATE" in the last 48 hours.    Significant Imaging: I have reviewed all pertinent imaging results/findings within the past 24 hours.  "

## 2025-07-30 PROBLEM — E87.1 HYPONATREMIA: Status: ACTIVE | Noted: 2025-07-30

## 2025-07-30 LAB
ABSOLUTE EOSINOPHIL (OHS): 1.01 K/UL
ABSOLUTE MONOCYTE (OHS): 0.89 K/UL (ref 0.3–1)
ABSOLUTE NEUTROPHIL COUNT (OHS): 8.52 K/UL (ref 1.8–7.7)
ALBUMIN SERPL BCP-MCNC: 2.4 G/DL (ref 3.5–5.2)
ALP SERPL-CCNC: 100 UNIT/L (ref 40–150)
ALT SERPL W/O P-5'-P-CCNC: 9 UNIT/L (ref 0–55)
ANION GAP (OHS): 12 MMOL/L (ref 8–16)
AST SERPL-CCNC: 20 UNIT/L (ref 0–50)
BASOPHILS # BLD AUTO: 0.02 K/UL
BASOPHILS NFR BLD AUTO: 0.2 %
BILIRUB SERPL-MCNC: 0.3 MG/DL (ref 0.1–1)
BUN SERPL-MCNC: 73 MG/DL (ref 8–23)
CALCIUM SERPL-MCNC: 7 MG/DL (ref 8.7–10.5)
CHLORIDE SERPL-SCNC: 104 MMOL/L (ref 95–110)
CO2 SERPL-SCNC: 19 MMOL/L (ref 23–29)
CREAT SERPL-MCNC: 12.4 MG/DL (ref 0.5–1.4)
ERYTHROCYTE [DISTWIDTH] IN BLOOD BY AUTOMATED COUNT: 17.5 % (ref 11.5–14.5)
GFR SERPLBLD CREATININE-BSD FMLA CKD-EPI: 4 ML/MIN/1.73/M2
GLUCOSE SERPL-MCNC: 70 MG/DL (ref 70–110)
HCT VFR BLD AUTO: 28.9 % (ref 40–54)
HGB BLD-MCNC: 9.2 GM/DL (ref 14–18)
IMM GRANULOCYTES # BLD AUTO: 0.09 K/UL (ref 0–0.04)
IMM GRANULOCYTES NFR BLD AUTO: 0.7 % (ref 0–0.5)
LYMPHOCYTES # BLD AUTO: 1.59 K/UL (ref 1–4.8)
MAGNESIUM SERPL-MCNC: 2.2 MG/DL (ref 1.6–2.6)
MCH RBC QN AUTO: 26.9 PG (ref 27–31)
MCHC RBC AUTO-ENTMCNC: 31.8 G/DL (ref 32–36)
MCV RBC AUTO: 85 FL (ref 82–98)
NUCLEATED RBC (/100WBC) (OHS): 0 /100 WBC
PHOSPHATE SERPL-MCNC: 4.1 MG/DL (ref 2.7–4.5)
PLATELET # BLD AUTO: 124 K/UL (ref 150–450)
PMV BLD AUTO: 11.9 FL (ref 9.2–12.9)
POTASSIUM SERPL-SCNC: 4.8 MMOL/L (ref 3.5–5.1)
PROT SERPL-MCNC: 5.4 GM/DL (ref 6–8.4)
RBC # BLD AUTO: 3.42 M/UL (ref 4.6–6.2)
RELATIVE EOSINOPHIL (OHS): 8.3 %
RELATIVE LYMPHOCYTE (OHS): 13.1 % (ref 18–48)
RELATIVE MONOCYTE (OHS): 7.3 % (ref 4–15)
RELATIVE NEUTROPHIL (OHS): 70.4 % (ref 38–73)
SODIUM SERPL-SCNC: 135 MMOL/L (ref 136–145)
WBC # BLD AUTO: 12.12 K/UL (ref 3.9–12.7)

## 2025-07-30 PROCEDURE — 84100 ASSAY OF PHOSPHORUS: CPT | Mod: HCNC | Performed by: STUDENT IN AN ORGANIZED HEALTH CARE EDUCATION/TRAINING PROGRAM

## 2025-07-30 PROCEDURE — 63600175 PHARM REV CODE 636 W HCPCS: Mod: HCNC | Performed by: STUDENT IN AN ORGANIZED HEALTH CARE EDUCATION/TRAINING PROGRAM

## 2025-07-30 PROCEDURE — 11000001 HC ACUTE MED/SURG PRIVATE ROOM: Mod: HCNC

## 2025-07-30 PROCEDURE — 63700000 PHARM REV CODE 250 ALT 637 W/O HCPCS: Mod: HCNC | Performed by: STUDENT IN AN ORGANIZED HEALTH CARE EDUCATION/TRAINING PROGRAM

## 2025-07-30 PROCEDURE — 83735 ASSAY OF MAGNESIUM: CPT | Mod: HCNC | Performed by: STUDENT IN AN ORGANIZED HEALTH CARE EDUCATION/TRAINING PROGRAM

## 2025-07-30 PROCEDURE — 25000003 PHARM REV CODE 250: Mod: HCNC | Performed by: STUDENT IN AN ORGANIZED HEALTH CARE EDUCATION/TRAINING PROGRAM

## 2025-07-30 PROCEDURE — 36415 COLL VENOUS BLD VENIPUNCTURE: CPT | Mod: HCNC | Performed by: STUDENT IN AN ORGANIZED HEALTH CARE EDUCATION/TRAINING PROGRAM

## 2025-07-30 PROCEDURE — 25000242 PHARM REV CODE 250 ALT 637 W/ HCPCS: Mod: HCNC | Performed by: STUDENT IN AN ORGANIZED HEALTH CARE EDUCATION/TRAINING PROGRAM

## 2025-07-30 PROCEDURE — 80053 COMPREHEN METABOLIC PANEL: CPT | Mod: HCNC | Performed by: STUDENT IN AN ORGANIZED HEALTH CARE EDUCATION/TRAINING PROGRAM

## 2025-07-30 PROCEDURE — 85025 COMPLETE CBC W/AUTO DIFF WBC: CPT | Mod: HCNC | Performed by: STUDENT IN AN ORGANIZED HEALTH CARE EDUCATION/TRAINING PROGRAM

## 2025-07-30 PROCEDURE — 94640 AIRWAY INHALATION TREATMENT: CPT | Mod: HCNC

## 2025-07-30 PROCEDURE — 99233 SBSQ HOSP IP/OBS HIGH 50: CPT | Mod: HCNC,GC,, | Performed by: INTERNAL MEDICINE

## 2025-07-30 PROCEDURE — 94761 N-INVAS EAR/PLS OXIMETRY MLT: CPT | Mod: HCNC

## 2025-07-30 RX ORDER — SODIUM CHLORIDE 9 MG/ML
INJECTION, SOLUTION INTRAVENOUS ONCE
Status: CANCELLED | OUTPATIENT
Start: 2025-07-30 | End: 2025-07-30

## 2025-07-30 RX ORDER — MUPIROCIN 20 MG/G
OINTMENT TOPICAL 2 TIMES DAILY
Status: CANCELLED | OUTPATIENT
Start: 2025-07-30 | End: 2025-08-04

## 2025-07-30 RX ORDER — FAMOTIDINE 20 MG/1
20 TABLET, FILM COATED ORAL EVERY OTHER DAY
Status: DISCONTINUED | OUTPATIENT
Start: 2025-08-01 | End: 2025-08-01 | Stop reason: HOSPADM

## 2025-07-30 RX ORDER — SODIUM CHLORIDE 9 MG/ML
INJECTION, SOLUTION INTRAVENOUS
Status: CANCELLED | OUTPATIENT
Start: 2025-07-30

## 2025-07-30 RX ADMIN — GABAPENTIN 300 MG: 300 CAPSULE ORAL at 08:07

## 2025-07-30 RX ADMIN — ATORVASTATIN CALCIUM 80 MG: 40 TABLET, FILM COATED ORAL at 09:07

## 2025-07-30 RX ADMIN — CLOPIDOGREL 75 MG: 75 TABLET ORAL at 09:07

## 2025-07-30 RX ADMIN — CEFTRIAXONE SODIUM 1 G: 1 INJECTION, POWDER, FOR SOLUTION INTRAMUSCULAR; INTRAVENOUS at 09:07

## 2025-07-30 RX ADMIN — APIXABAN 5 MG: 5 TABLET, FILM COATED ORAL at 09:07

## 2025-07-30 RX ADMIN — AZITHROMYCIN DIHYDRATE 500 MG: 250 TABLET ORAL at 09:07

## 2025-07-30 RX ADMIN — SEVELAMER CARBONATE 800 MG: 800 TABLET, FILM COATED ORAL at 10:07

## 2025-07-30 RX ADMIN — AMIODARONE HYDROCHLORIDE 200 MG: 200 TABLET ORAL at 09:07

## 2025-07-30 RX ADMIN — FAMOTIDINE 20 MG: 20 TABLET, FILM COATED ORAL at 09:07

## 2025-07-30 RX ADMIN — FLUTICASONE FUROATE AND VILANTEROL TRIFENATATE 1 PUFF: 100; 25 POWDER RESPIRATORY (INHALATION) at 08:07

## 2025-07-30 RX ADMIN — TORSEMIDE 40 MG: 20 TABLET ORAL at 11:07

## 2025-07-30 RX ADMIN — APIXABAN 5 MG: 5 TABLET, FILM COATED ORAL at 08:07

## 2025-07-30 NOTE — PLAN OF CARE
No acute events.  Pt AAOX4. VSS. Pt ambulating in room independently.  HD pending.      Problem: Hospitalized Older Adult  Goal: Optimal Cognitive Function  Outcome: Progressing  Goal: Effective Bowel Elimination  Outcome: Progressing  Goal: Optimal Coping  Outcome: Progressing  Goal: Fluid and Electrolyte Balance  Outcome: Progressing  Goal: Optimal Functional Ability  Outcome: Progressing  Goal: Improved Oral Intake  Outcome: Progressing  Goal: Adequate Sleep/Rest  Outcome: Progressing  Goal: Effective Urinary Elimination  Outcome: Progressing     Problem: Adult Inpatient Plan of Care  Goal: Plan of Care Review  Outcome: Progressing  Goal: Patient-Specific Goal (Individualized)  Outcome: Progressing  Goal: Absence of Hospital-Acquired Illness or Injury  Outcome: Progressing  Goal: Optimal Comfort and Wellbeing  Outcome: Progressing  Goal: Readiness for Transition of Care  Outcome: Progressing     Problem: Sepsis/Septic Shock  Goal: Optimal Coping  Outcome: Progressing  Goal: Absence of Bleeding  Outcome: Progressing  Goal: Blood Glucose Level Within Targeted Range  Outcome: Progressing  Goal: Absence of Infection Signs and Symptoms  Outcome: Progressing  Goal: Optimal Nutrition Intake  Outcome: Progressing     Problem: Wound  Goal: Optimal Coping  Outcome: Progressing  Goal: Optimal Functional Ability  Outcome: Progressing  Goal: Absence of Infection Signs and Symptoms  Outcome: Progressing  Goal: Improved Oral Intake  Outcome: Progressing  Goal: Optimal Pain Control and Function  Outcome: Progressing  Goal: Skin Health and Integrity  Outcome: Progressing  Goal: Optimal Wound Healing  Outcome: Progressing     Problem: Hemodialysis  Goal: Safe, Effective Therapy Delivery  Outcome: Progressing  Goal: Effective Tissue Perfusion  Outcome: Progressing  Goal: Absence of Infection Signs and Symptoms  Outcome: Progressing

## 2025-07-30 NOTE — ASSESSMENT & PLAN NOTE
Patient has long standing persistent (>12 months) atrial fibrillation. Patient is currently in sinus rhythm. [id number] Score: 2. The patients heart rate in the last 24 hours is as follows:  Pulse  Min: 51  Max: 90     Antiarrhythmics  amiodarone tablet 200 mg, Daily, Oral    Anticoagulants  apixaban tablet 5 mg, 2 times daily, Oral    Plan  - Replete lytes with a goal of K>4, Mg >2  - Patient is anticoagulated, see medications listed above.  - Patient's afib is currently controlled

## 2025-07-30 NOTE — ASSESSMENT & PLAN NOTE
Anemia is likely due to chronic disease due to ESRD. Most recent hemoglobin and hematocrit are listed below.  Recent Labs     07/29/25  1301 07/29/25  1641 07/30/25  0521   HGB 10.0*  --  9.2*   HCT 32.0* 33.3 28.9*     Plan  - Monitor serial CBC: Daily  - Transfuse PRBC if patient becomes hemodynamically unstable, symptomatic or H/H drops below 7/21.  - Patient has not received any PRBC transfusions to date  - Patient's anemia is currently stable

## 2025-07-30 NOTE — ASSESSMENT & PLAN NOTE
Patient's blood pressure range in the last 24 hours was: BP  Min: 91/54  Max: 127/59.The patient's inpatient anti-hypertensive regimen is listed below:  Current Antihypertensives  torsemide tablet 40 mg, Three times weekly, Oral    Plan  - BP is controlled, no changes needed to their regimen

## 2025-07-30 NOTE — SUBJECTIVE & OBJECTIVE
Interval History:   Patient with resolution of tremor overnight.  Still complaints of dyspnea, but improved from yesterday.  Nephrology following for HD needs.  Continue ceftriaxone and azithromycin.      Review of Systems   All other systems reviewed and are negative.    Objective:     Vital Signs (Most Recent):  Temp: 97.7 °F (36.5 °C) (07/30/25 1105)  Pulse: 61 (07/30/25 1505)  Resp: 17 (07/30/25 1105)  BP: (!) 118/59 (07/30/25 1105)  SpO2: 97 % (07/30/25 1105) Vital Signs (24h Range):  Temp:  [97.4 °F (36.3 °C)-98.3 °F (36.8 °C)] 97.7 °F (36.5 °C)  Pulse:  [51-90] 61  Resp:  [16-18] 17  SpO2:  [95 %-99 %] 97 %  BP: ()/(51-63) 118/59     Weight: 92.5 kg (204 lb)  Body mass index is 26.91 kg/m².    Intake/Output Summary (Last 24 hours) at 7/30/2025 1638  Last data filed at 7/29/2025 1724  Gross per 24 hour   Intake 750 ml   Output --   Net 750 ml         Physical Exam  Constitutional:       Appearance: Normal appearance. He is normal weight. He is ill-appearing.   HENT:      Head: Normocephalic and atraumatic.      Mouth/Throat:      Mouth: Mucous membranes are moist.   Eyes:      Extraocular Movements: Extraocular movements intact.      Conjunctiva/sclera: Conjunctivae normal.   Cardiovascular:      Rate and Rhythm: Normal rate and regular rhythm.      Heart sounds: No murmur heard.  Pulmonary:      Effort: Pulmonary effort is normal. No respiratory distress.      Breath sounds: Wheezing present. No rales.   Abdominal:      General: Abdomen is flat. There is no distension.      Palpations: Abdomen is soft.      Tenderness: There is no abdominal tenderness. There is no guarding.   Musculoskeletal:         General: No swelling or tenderness.   Skin:     Findings: No rash.   Neurological:      General: No focal deficit present.      Mental Status: He is oriented to person, place, and time. Mental status is at baseline.      Motor: Tremor (Periodic intermittent whole-body tremor) present.   Psychiatric:          Mood and Affect: Mood normal.         Behavior: Behavior normal.               Significant Labs: All pertinent labs within the past 24 hours have been reviewed.  CBC:   Recent Labs   Lab 07/29/25  1301 07/29/25  1641 07/30/25  0521   WBC 8.92  --  12.12   HGB 10.0*  --  9.2*   HCT 32.0* 33.3 28.9*   *  --  124*     CMP:   Recent Labs   Lab 07/29/25  1301 07/29/25  1635 07/30/25  0521    135* 135*   K 4.7 4.7 4.8    104 104   CO2 20* 19* 19*   GLU 82 78 70   BUN 63* 67* 73*   CREATININE 11.6* 11.6* 12.4*   CALCIUM 7.2* 7.1* 7.0*   PROT 6.2  --  5.4*   ALBUMIN 2.8* 2.6* 2.4*   BILITOT 0.4  --  0.3   ALKPHOS 95  --  100   AST 33  --  20   ALT 15  --  9   ANIONGAP 15 12 12       Significant Imaging: I have reviewed all pertinent imaging results/findings within the past 24 hours.

## 2025-07-30 NOTE — PROGRESS NOTES
Pharmacist Renal Dose Adjustment Note    Ibrahima Phelps is a 69 y.o. male being treated with the medication famotidine    Patient Data:    Vital Signs (Most Recent):  Temp: 97.7 °F (36.5 °C) (07/30/25 1105)  Pulse: 61 (07/30/25 1505)  Resp: 17 (07/30/25 1105)  BP: (!) 118/59 (07/30/25 1105)  SpO2: 97 % (07/30/25 1105) Vital Signs (72h Range):  Temp:  [97.4 °F (36.3 °C)-98.5 °F (36.9 °C)]   Pulse:  [51-90]   Resp:  [16-20]   BP: ()/(43-64)   SpO2:  [93 %-99 %]      Recent Labs   Lab 07/29/25  1301 07/29/25  1635 07/30/25  0521   CREATININE 11.6* 11.6* 12.4*     Serum creatinine: 12.4 mg/dL (H) 07/30/25 0521  Estimated creatinine clearance: 6.3 mL/min (A)    Medication:famotidine 20 mg PO QDAy will be changed to medication:famotidine 20 mg PO every other day    Pharmacist's Name: Haritha He  Pharmacist's Extension: [phone]

## 2025-07-30 NOTE — PROGRESS NOTES
[facility] - Medical   Nephrology  Progress Note          Patient Name: Ibrahima Phelps   MRN: [id number]   Current Provider: Juan Miller MD  Primary Care Provider: Anatoliy Colindres MD   Admission Date: 7/29/2025   Hospital Day: 1  Bed: 03253/48352 A  Principal Problem: Community acquired bacterial pneumonia            NEPHROLOGY NOTE    Reason for Consultation:   ESRD     History of Present Illness:       Ibrahima Phelps is a 69 y.o. male with PMHx of Failed kidney transplant in 1992 and then in 2005 now ESRD on HD ,coronary artery disease, MI, hypertension, atrial fibrillation, pericarditis, ESRD on TTS schedule. Wife diagnosed with COVID recently      Admitted to the hospital with fevers this morning, along with shivering and shortness of breath. Did not go for his dialysis session today     Hypotensive 85/47 at presentation, given midodrine and started on antibiotics for presumed pneumonia      Nephrology consulted for dialysis management.    Today feels better. Pressure are okay 140/60s    Denies any chest pain, n/v/d.  No lower extremity edema     Past Medical History:   Diagnosis Date    Atrial fibrillation     CAD (coronary artery disease) 2006    MI in 2006    CHF (congestive heart failure) 2017    CKD (chronic kidney disease) stage 3, GFR 30-59 ml/min     Dr. Latif.  in transplanted kidney    CKD (chronic kidney disease) stage 5, GFR less than 15 ml/min 02/02/2024    COVID-19 02/07/2023    Diverticulosis     Hyperlipidemia     Hypertension     Keloid cicatrix     Metabolic bone disease     Other emphysema 10/01/2019    Pericarditis     S/P kidney transplant 1992    x2 (1992 & 2005),    Thrombocytopenia     Thyroid disease     Tobacco use 09/05/2014     Past Surgical History:   Procedure Laterality Date    AV FISTULA PLACEMENT Left 12/18/2023    Procedure: CREATION, AV FISTULA;  Surgeon: JUANI Melendez III, MD;  Location: [facility] OR [address] [place] [address];  Service: Vascular;  Laterality: Left;  LUE AVF creation vs AVG  "insertion    CARDIOVERSION  04/30/15    CHOLECYSTECTOMY      COLONOSCOPY  April 20, 2011    Diverticulosis, repeat recommended in 3 yrs., repeat colonoscopy 2014 revealed 2 polyps.  He should return in 5 years.    COLONOSCOPY N/A 3/13/2020    Procedure: COLONOSCOPY;  Surgeon: Chon Casper MD;  Location: [facility] (4TH FLR);  Service: Endoscopy;  Laterality: N/A;  ok to hold coumadin x5days-see telephone encounter 2/4/20-tb    COLONOSCOPY N/A 2/4/2021    Procedure: COLONOSCOPY;  Surgeon: Chon Casper MD;  Location: [facility] (4TH FLR);  Service: Endoscopy;  Laterality: N/A;  Eliquis - per Dr. GAVIN Blunt ok to hold x 2 days and "restarted asap"- ERW  last prep "poor", [id number] ordered/ Pt requests Dr. aCsper  prep ins. mailed - COVID screening on 2/1/21 PCW- ERW    COLONOSCOPY N/A 3/3/2021    Procedure: COLONOSCOPY;  Surgeon: Chon Casper MD;  Location: [facility] (4TH FLR);  Service: Endoscopy;  Laterality: N/A;  Patient with his second poor bowel pre and has poor prep today.  If patient not intersted or can't get colonoscopy tomorrow will need constipation bowel prep and will need to restart his Eliquis today.Thanks,Chon     per Dr Blunt-ok to hold Eliquis 2 days prior      COVID     COLONOSCOPY N/A 12/6/2024    Procedure: COLONOSCOPY;  Surgeon: Chon Casper MD;  Location: [facility] (2ND FLR);  Service: Endoscopy;  Laterality: N/A;  Plavix-Eliquis pending/ HD T-Th-Sat- lab ordered  2/25/24 ECHO PA 49  ref by / [facility] inst portal-RN  11/29-message to clearance nurse for plavix and eliquis hold-tb  ok to hold Eliquis 2 days per Dr Parmar  ok to hold Plavix 5 days per MEENA CUNNINGHAM-GT  12/2- p    CORONARY ANGIOGRAPHY N/A 2/28/2024    Procedure: ANGIOGRAM, CORONARY ARTERY;  Surgeon: Tylor Lincoln MD;  Location: [facility] CATH LAB;  Service: Cardiology;  Laterality: N/A;    CORONARY ANGIOGRAPHY N/A 7/22/2025    Procedure: ANGIOGRAM, CORONARY ARTERY;  Surgeon: Tylor Lincoln MD;  Location: " [facility] CATH LAB;  Service: Cardiology;  Laterality: N/A;    CORONARY ANGIOPLASTY WITH STENT PLACEMENT  9/13/2006    FISTULOGRAM N/A 2/19/2024    Procedure: Fistulogram;  Surgeon: JUANI Melendez III, MD;  Location: [facility] CATH LAB;  Service: Peripheral Vascular;  Laterality: N/A;    FISTULOGRAM, WITH PTA Left 6/3/2024    Procedure: FISTULOGRAM, WITH PTA;  Surgeon: JUANI Melendez III, MD;  [facility];  Service: Vascular;  Laterality: Left;  mGy: 51.58  GyCm2: 6.8285  Fluoro time: 5.1 min  Contrast: 28 cc    FISTULOGRAM, WITH PTA Left 3/5/2025    Procedure: FISTULOGRAM, WITH PTA;  Surgeon: JUANI Melendez III, MD;  [facility];  Service: Vascular;  Laterality: Left;    IRRIGATION AND DEBRIDEMENT Right 8/30/2023    Procedure: IRRIGATION AND DEBRIDEMENT, RIGHT MIDDLE FINGER;  Surgeon: Martin Larsen MD;  Location: [address];  Service: Orthopedics;  Laterality: Right;    KIDNEY TRANSPLANT  2005    LEFT HEART CATHETERIZATION N/A 2/26/2024    Procedure: Left heart cath;  Surgeon: Tylor Lincoln MD;  Location: [facility] CATH LAB;  Service: Cardiology;  Laterality: N/A;    PARATHYROIDECTOMY      PERCUTANEOUS TRANSLUMINAL ANGIOPLASTY OF ARTERIOVENOUS FISTULA Left 2/19/2024    Procedure: PTA, AV FISTULA;  Surgeon: JUANI Melendez III, MD;  Location: [facility] CATH LAB;  Service: Peripheral Vascular;  Laterality: Left;    STENT, DRUG ELUTING, SINGLE VESSEL, CORONARY N/A 2/28/2024    Procedure: Stent, Drug Eluting, Single Vessel, Coronary;  Surgeon: Tylor Lincoln MD;  Location: [facility] CATH LAB;  Service: Cardiology;  Laterality: N/A;    STENT, PERIPHERAL GRAFT Left 3/5/2025    Procedure: STENT, PERIPHERAL GRAFT;  Surgeon: JUANI Melendez III, MD;  [facility];  Service: Vascular;  Laterality: Left;  mGy 63.17  Gycm2 8.3386  flouro 63.17 min  contrast 38 ml    TREATMENT OF CARDIAC ARRHYTHMIA N/A 3/28/2022    Procedure: CARDIOVERSION;  Surgeon: Migue Blunt MD;   Location: [facility] EP LAB;  Service: Cardiology;  Laterality: N/A;  AF, DCC, MAC, GP, 3 PREP*RODRIGO deferred pt 100% compliant*    VENOPLASTY  6/3/2024    Procedure: ANGIOPLASTY, VEIN;  Surgeon: JUANI Melendez III, MD;  Location: [facility] OR [address] [place] [address];  Service: Vascular;;  Left subclavian     Medications:   amiodarone  200 mg Oral Daily    apixaban  5 mg Oral BID    atorvastatin  80 mg Oral Daily    azithromycin  500 mg Oral Daily    cefTRIAXone (Rocephin) IV (PEDS and ADULTS)  1 g Intravenous Daily    clopidogreL  75 mg Oral Daily    famotidine  20 mg Oral Daily    fluticasone furoate-vilanteroL  1 puff Inhalation Daily    gabapentin  300 mg Oral QHS    methocarbamoL  500 mg Oral Once    sevelamer carbonate  800 mg Oral Daily with breakfast    torsemide  40 mg Oral Once per day on Monday Wednesday Friday      Review of patient's allergies indicates:   Allergen Reactions    Ace inhibitors Swelling    Iodine Itching    Verapamil Other (See Comments)     Other reaction(s): Unknown    Povidone-iodine Itching     Family History   Problem Relation Name Age of Onset    No Known Problems Sister      No Known Problems Brother      Thyroid disease Mother          s/p surgery    Heart disease Father          had pacemaker    No Known Problems Sister      Kidney failure Sister      Kidney disease Sister      No Known Problems Sister      Kidney disease Brother      Kidney failure Brother          s/p transplant    Diabetes Mellitus Neg Hx      Stroke Neg Hx      Heart attack Neg Hx      Cancer Neg Hx      Celiac disease Neg Hx      Cirrhosis Neg Hx      Colon cancer Neg Hx      Esophageal cancer Neg Hx      Inflammatory bowel disease Neg Hx      Rectal cancer Neg Hx      Stomach cancer Neg Hx      Ulcerative colitis Neg Hx      Liver disease Neg Hx      Liver cancer Neg Hx      Crohn's disease Neg Hx      Melanoma Neg Hx        Social History[1]     Review of Systems:      ROS is negative except for what is mentioned in the  "HPI    Physical Examination:      Vital Signs   BP (!) 127/59 (BP Location: Right arm, Patient Position: Lying)   Pulse 63   Temp 97.4 °F (36.3 °C) (Oral)   Resp 17   Ht 6' 1" (1.854 m)   Wt 92.5 kg (204 lb)   SpO2 99%   BMI 26.91 kg/m²    I/O last 3 completed shifts:  In: 750 [I.V.:500; IV Piggyback:250]  Out: -     BP Readings from Last 3 Encounters:   07/30/25 (!) 127/59   07/23/25 (!) 149/68   07/21/25 135/61        Gen: NAD  HEENT: NC.   Neck: atrumatic  Heart: audible heart sounds, no edema  Lungs: CTAB, no w/r/r  Access:     LABS:    Lab Results   Component Value Date    WBC 12.12 07/30/2025    HGB 9.2 (L) 07/30/2025    HCT 28.9 (L) 07/30/2025     (L) 07/30/2025    CHOL 109 (L) 06/11/2024    CHOL 109 (L) 06/11/2024    TRIG 51 06/11/2024    TRIG 51 06/11/2024    HDL 54 06/11/2024    HDL 54 06/11/2024    ALT 9 07/30/2025    AST 20 07/30/2025     (L) 07/30/2025    K 4.8 07/30/2025     07/30/2025    CREATININE 12.4 (H) 07/30/2025    BUN 73 (H) 07/30/2025    CO2 19 (L) 07/30/2025    TSH 1.897 06/07/2025    PSA 0.56 02/06/2024    INR 1.1 07/22/2025    HGBA1C 4.8 06/11/2024        Lab Results   Component Value Date    HGB 9.2 (L) 07/30/2025    HGB 10.0 (L) 07/29/2025    HGB 10.6 (L) 07/23/2025    HGB 13.2 (L) 03/01/2025    HGB 15.1 02/28/2025    HGB 16.0 02/28/2025    FERRITIN 634 (H) 02/20/2024        Lab Results   Component Value Date    CALCIUM 7.0 (L) 07/30/2025    CALCIUM 7.1 (L) 07/29/2025    CALCIUM 8.5 (L) 03/01/2025    CALCIUM 8.5 (L) 02/28/2025    PTH 7.6 (L) 12/20/2023    PTH 39.4 11/14/2023    PTH 58 09/15/2014    PTH 51 05/16/2014        Assessment/Plan:    Ibrahima Phelps 69 y.o. male,  Presented with Sepsis secondary to pneumonia likely,Hypotensive, given midodrine, started on antibiotics      Septic Shock, Source likely pneumonia  Management as per primary team         Diagnosis: ESRD on dialysis   Outpatient HD unit: [facility]  -Nephrologist: Dr Gambino  -HD tx days: " TTS  -Last HD tx: missed session on 7/29  -HD access: LUE AV fistula  -HD modality: iHD  -Residual urine: +  -EDW:  90 kg      Recommendation:  Will plan HD session later today , will try to keep him on his TTS schedule unless an emergent need arise   Low potassium, low phosphorous renal diet  Strictly monitor input and outputs   Consent for dialysis taken      Anemia of renal disease - Hb 10  -Trend Hb for goal 9-11 g/dl         Secondary hyperparathyroidism - Phos 4.8 (Phos goal 3.5-5.5)  -Continue home sevelamer  -Trend Ca/phos     Nutrition  albumin 2.8  -Renal diet (low phos, low K).   -Goal Albumin is 4mg/dL.    -If patient has poor oral intake, recommend nepro nutritional shakes    Octavia Matthews MD.  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     Patient is currently retired   Substance and Sexual Activity    Alcohol use: No    Drug use: No    Sexual activity: Yes     Partners: Female   Social History Narrative    Retired      Social Drivers of Health     Financial Resource Strain: Low Risk  (7/21/2025)    Overall Financial Resource Strain (CARDIA)     Difficulty of Paying Living Expenses: Not very hard   Food Insecurity: No Food Insecurity (7/21/2025)    Hunger Vital Sign     Worried About Running Out of Food in the Last Year: Never true     Ran Out of Food in the Last Year: Never true   Transportation Needs: No Transportation Needs (7/21/2025)    PRAPARE - Transportation      Lack of Transportation (Medical): No     Lack of Transportation (Non-Medical): No   Physical Activity: Inactive (7/21/2025)    Exercise Vital Sign     Days of Exercise per Week: 0 days     Minutes of Exercise per Session: 0 min   Stress: No Stress Concern Present (7/21/2025)    [employer] - Occupational Stress Questionnaire     Feeling of Stress : Only a little   Housing Stability: Low Risk  (7/21/2025)    Housing Stability Vital Sign     Unable to Pay for Housing in the Last Year: No     Number of Times Moved in the Last Year: 0     Homeless in the Last Year: No

## 2025-07-30 NOTE — ASSESSMENT & PLAN NOTE
Creatine stable for now. BMP reviewed- noted Estimated Creatinine Clearance: 6.3 mL/min (A) (based on SCr of 12.4 mg/dL (H)). according to latest data. Based on current GFR, CKD stage is end stage.  Monitor UOP and serial BMP and adjust therapy as needed. Renally dose meds. Avoid nephrotoxic medications and procedures.    - Nephrology consulted for HD

## 2025-07-30 NOTE — PROGRESS NOTES
[facility] - [facility] Medicine  Progress Note    Patient Name: Ibrahima Phelps  MRN: [id number]  Patient Class: IP- Inpatient   Admission Date: 7/29/2025  Length of Stay: 1 days  Attending Physician: Juan Miller MD  Primary Care Provider: Anatoliy Colindres MD        Subjective     Principal Problem:Community acquired bacterial pneumonia        HPI:  Mr. Phelps is a 69-year-old male with history of CAD with PCI in 2006 & 2024 with a angiogram (7/22) showing significant 3 vessel disease, HFpEF, kidney transplant on dialysis, hypertension, atrial fibrillation, COPD (no home O2), pericarditis, and ESRD on T/R/Sa HD who presents with one day of dyspnea, fevers, chills.  Patient reports no sick contacts.  Last underwent routine HD without missing sessions, and due for dialysis on day of presentation.  Patient makes no urine.  Patient states he took reading treatment with mild improvement of symptoms.  Patient reports seeing black spots in his vision while ambulating at home symmetry previous episodes of low blood pressure.      In the ED, BP 82/47, HR 69, afebrile, and on room air.  Labs notable for WBC 8.9, hemoglobin 10, sodium 135, potassium 4.7, calcium 7.1, and per BNP 9716, flu/COVID negative, and lactic acid 2.6.  Chest x-ray with right perihilar and lower lobe infiltrate noted.  Given ceftriaxone and azithromycin in the ED. while in the ED, patient noted to have new onset diffuse intermittent body tremors.      Overview/Hospital Course:  No notes on file    Interval History:   Patient with resolution of tremor overnight.  Still complaints of dyspnea, but improved from yesterday.  Nephrology following for HD needs.  Continue ceftriaxone and azithromycin.      Review of Systems   All other systems reviewed and are negative.    Objective:     Vital Signs (Most Recent):  Temp: 97.7 °F (36.5 °C) (07/30/25 1105)  Pulse: 61 (07/30/25 1505)  Resp: 17 (07/30/25 1105)  BP: (!) 118/59 (07/30/25  1105)  SpO2: 97 % (07/30/25 1105) Vital Signs (24h Range):  Temp:  [97.4 °F (36.3 °C)-98.3 °F (36.8 °C)] 97.7 °F (36.5 °C)  Pulse:  [51-90] 61  Resp:  [16-18] 17  SpO2:  [95 %-99 %] 97 %  BP: ()/(51-63) 118/59     Weight: 92.5 kg (204 lb)  Body mass index is 26.91 kg/m².    Intake/Output Summary (Last 24 hours) at 7/30/2025 1638  Last data filed at 7/29/2025 1724  Gross per 24 hour   Intake 750 ml   Output --   Net 750 ml         Physical Exam  Constitutional:       Appearance: Normal appearance. He is normal weight. He is ill-appearing.   HENT:      Head: Normocephalic and atraumatic.      Mouth/Throat:      Mouth: Mucous membranes are moist.   Eyes:      Extraocular Movements: Extraocular movements intact.      Conjunctiva/sclera: Conjunctivae normal.   Cardiovascular:      Rate and Rhythm: Normal rate and regular rhythm.      Heart sounds: No murmur heard.  Pulmonary:      Effort: Pulmonary effort is normal. No respiratory distress.      Breath sounds: Wheezing present. No rales.   Abdominal:      General: Abdomen is flat. There is no distension.      Palpations: Abdomen is soft.      Tenderness: There is no abdominal tenderness. There is no guarding.   Musculoskeletal:         General: No swelling or tenderness.   Skin:     Findings: No rash.   Neurological:      General: No focal deficit present.      Mental Status: He is oriented to person, place, and time. Mental status is at baseline.      Motor: Tremor (Periodic intermittent whole-body tremor) present.   Psychiatric:         Mood and Affect: Mood normal.         Behavior: Behavior normal.               Significant Labs: All pertinent labs within the past 24 hours have been reviewed.  CBC:   Recent Labs   Lab 07/29/25  1301 07/29/25  1641 07/30/25  0521   WBC 8.92  --  12.12   HGB 10.0*  --  9.2*   HCT 32.0* 33.3 28.9*   *  --  124*     CMP:   Recent Labs   Lab 07/29/25  1301 07/29/25  1635 07/30/25  0521    135* 135*   K 4.7 4.7 4.8   CL  102 104 104   CO2 20* 19* 19*   GLU 82 78 70   BUN 63* 67* 73*   CREATININE 11.6* 11.6* 12.4*   CALCIUM 7.2* 7.1* 7.0*   PROT 6.2  --  5.4*   ALBUMIN 2.8* 2.6* 2.4*   BILITOT 0.4  --  0.3   ALKPHOS 95  --  100   AST 33  --  20   ALT 15  --  9   ANIONGAP 15 12 12       Significant Imaging: I have reviewed all pertinent imaging results/findings within the past 24 hours.      Assessment & Plan  Community acquired bacterial pneumonia  SOB (shortness of breath)  Patient presenting with 1 day of dyspnea, fevers, chills, and hypotension concerning for sepsis related to bacterial pneumonia    - Ceftriaxone, azithromycin   - BCx NGTD  - Pulmonary toilet    Primary hypertension  Patient's blood pressure range in the last 24 hours was: BP  Min: 91/54  Max: 127/59.The patient's inpatient anti-hypertensive regimen is listed below:  Current Antihypertensives  torsemide tablet 40 mg, Three times weekly, Oral    Plan  - BP is controlled, no changes needed to their regimen    Postural tremor  Patient with new onset tremor in the ED that he and wife says is new and has not occurred before.    - Resolved    Stented coronary artery  Mixed hyperlipidemia  Patient with known CAD s/p stent placement, which is controlled Will continue Plavix and Statin and monitor for S/Sx of angina/ACS. Continue to monitor on telemetry.       ESRD (end stage renal disease)  Creatine stable for now. BMP reviewed- noted Estimated Creatinine Clearance: 6.3 mL/min (A) (based on SCr of 12.4 mg/dL (H)). according to latest data. Based on current GFR, CKD stage is end stage.  Monitor UOP and serial BMP and adjust therapy as needed. Renally dose meds. Avoid nephrotoxic medications and procedures.    - Nephrology consulted for HD    GERD (gastroesophageal reflux disease)  Continue home medication    Anemia in chronic kidney disease  Anemia is likely due to chronic disease due to ESRD. Most recent hemoglobin and hematocrit are listed below.  Recent Labs      07/29/25  1301 07/29/25  1641 07/30/25  0521   HGB 10.0*  --  9.2*   HCT 32.0* 33.3 28.9*     Plan  - Monitor serial CBC: Daily  - Transfuse PRBC if patient becomes hemodynamically unstable, symptomatic or H/H drops below 7/21.  - Patient has not received any PRBC transfusions to date  - Patient's anemia is currently stable    Hemodialysis-associated hypotension  As needed midodrine    Paroxysmal atrial fibrillation  Patient has long standing persistent (>12 months) atrial fibrillation. Patient is currently in sinus rhythm. [id number] Score: 2. The patients heart rate in the last 24 hours is as follows:  Pulse  Min: 51  Max: 90     Antiarrhythmics  amiodarone tablet 200 mg, Daily, Oral    Anticoagulants  apixaban tablet 5 mg, 2 times daily, Oral    Plan  - Replete lytes with a goal of K>4, Mg >2  - Patient is anticoagulated, see medications listed above.  - Patient's afib is currently controlled    Hyponatremia  - Noted    VTE Risk Mitigation (From admission, onward)           Ordered     apixaban tablet 5 mg  2 times daily         07/29/25 1740     IP VTE HIGH RISK PATIENT  Once         07/29/25 1531     Place sequential compression device  Until discontinued         07/29/25 1531                    Discharge Planning   RAMU: 8/1/2025     Code Status: Full Code   Medical Readiness for Discharge Date:                            Juan Miller MD  Department of Hospital Medicine   [facility] - Acute Medical Stepdown

## 2025-07-30 NOTE — PLAN OF CARE
Problem: Hospitalized Older Adult  Goal: Optimal Cognitive Function  Outcome: Progressing  Goal: Effective Bowel Elimination  Outcome: Progressing  Goal: Optimal Coping  Outcome: Progressing  Goal: Fluid and Electrolyte Balance  Outcome: Progressing  Goal: Optimal Functional Ability  Outcome: Progressing  Goal: Improved Oral Intake  Outcome: Progressing  Goal: Adequate Sleep/Rest  Outcome: Progressing  Goal: Effective Urinary Elimination  Outcome: Progressing     Problem: Adult Inpatient Plan of Care  Goal: Plan of Care Review  Outcome: Progressing  Goal: Patient-Specific Goal (Individualized)  Outcome: Progressing  Goal: Absence of Hospital-Acquired Illness or Injury  Outcome: Progressing  Goal: Optimal Comfort and Wellbeing  Outcome: Progressing  Goal: Readiness for Transition of Care  Outcome: Progressing     Problem: Sepsis/Septic Shock  Goal: Optimal Coping  Outcome: Progressing  Goal: Absence of Bleeding  Outcome: Progressing  Goal: Blood Glucose Level Within Targeted Range  Outcome: Progressing  Goal: Absence of Infection Signs and Symptoms  Outcome: Progressing  Goal: Optimal Nutrition Intake  Outcome: Progressing     Problem: Wound  Goal: Optimal Coping  Outcome: Progressing  Goal: Optimal Functional Ability  Outcome: Progressing  Goal: Absence of Infection Signs and Symptoms  Outcome: Progressing  Goal: Improved Oral Intake  Outcome: Progressing  Goal: Optimal Pain Control and Function  Outcome: Progressing  Goal: Skin Health and Integrity  Outcome: Progressing  Goal: Optimal Wound Healing  Outcome: Progressing   Pt Aox4, RA. Family present at bedside. Call light within reach, safety measures in place, rounded per facility policy.

## 2025-07-30 NOTE — ASSESSMENT & PLAN NOTE
Patient presenting with 1 day of dyspnea, fevers, chills, and hypotension concerning for sepsis related to bacterial pneumonia    - Ceftriaxone, azithromycin   - BCx NGTD  - Pulmonary toilet

## 2025-07-30 NOTE — ASSESSMENT & PLAN NOTE
Patient with new onset tremor in the ED that he and wife says is new and has not occurred before.    - Resolved

## 2025-07-31 LAB
ABSOLUTE EOSINOPHIL (OHS): 0.75 K/UL
ABSOLUTE MONOCYTE (OHS): 0.85 K/UL (ref 0.3–1)
ABSOLUTE NEUTROPHIL COUNT (OHS): 5.89 K/UL (ref 1.8–7.7)
ALBUMIN SERPL BCP-MCNC: 2.6 G/DL (ref 3.5–5.2)
ANION GAP (OHS): 10 MMOL/L (ref 8–16)
BASOPHILS # BLD AUTO: 0.02 K/UL
BASOPHILS NFR BLD AUTO: 0.2 %
BUN SERPL-MCNC: 31 MG/DL (ref 8–23)
CALCIUM SERPL-MCNC: 7.8 MG/DL (ref 8.7–10.5)
CHLORIDE SERPL-SCNC: 104 MMOL/L (ref 95–110)
CO2 SERPL-SCNC: 26 MMOL/L (ref 23–29)
CREAT SERPL-MCNC: 7.1 MG/DL (ref 0.5–1.4)
ERYTHROCYTE [DISTWIDTH] IN BLOOD BY AUTOMATED COUNT: 17.4 % (ref 11.5–14.5)
GFR SERPLBLD CREATININE-BSD FMLA CKD-EPI: 8 ML/MIN/1.73/M2
GLUCOSE SERPL-MCNC: 72 MG/DL (ref 70–110)
HCT VFR BLD AUTO: 33.4 % (ref 40–54)
HGB BLD-MCNC: 10.5 GM/DL (ref 14–18)
IMM GRANULOCYTES # BLD AUTO: 0.05 K/UL (ref 0–0.04)
IMM GRANULOCYTES NFR BLD AUTO: 0.6 % (ref 0–0.5)
LYMPHOCYTES # BLD AUTO: 0.97 K/UL (ref 1–4.8)
MAGNESIUM SERPL-MCNC: 2 MG/DL (ref 1.6–2.6)
MCH RBC QN AUTO: 26.4 PG (ref 27–31)
MCHC RBC AUTO-ENTMCNC: 31.4 G/DL (ref 32–36)
MCV RBC AUTO: 84 FL (ref 82–98)
NUCLEATED RBC (/100WBC) (OHS): 0 /100 WBC
PHOSPHATE SERPL-MCNC: 2.9 MG/DL (ref 2.7–4.5)
PLATELET # BLD AUTO: 156 K/UL (ref 150–450)
PMV BLD AUTO: 12.3 FL (ref 9.2–12.9)
POTASSIUM SERPL-SCNC: 4.1 MMOL/L (ref 3.5–5.1)
RBC # BLD AUTO: 3.97 M/UL (ref 4.6–6.2)
RELATIVE EOSINOPHIL (OHS): 8.8 %
RELATIVE LYMPHOCYTE (OHS): 11.4 % (ref 18–48)
RELATIVE MONOCYTE (OHS): 10 % (ref 4–15)
RELATIVE NEUTROPHIL (OHS): 69 % (ref 38–73)
SODIUM SERPL-SCNC: 140 MMOL/L (ref 136–145)
WBC # BLD AUTO: 8.53 K/UL (ref 3.9–12.7)

## 2025-07-31 PROCEDURE — 94761 N-INVAS EAR/PLS OXIMETRY MLT: CPT | Mod: HCNC

## 2025-07-31 PROCEDURE — 80100014 HC HEMODIALYSIS 1:1: Mod: HCNC

## 2025-07-31 PROCEDURE — 85025 COMPLETE CBC W/AUTO DIFF WBC: CPT | Mod: HCNC | Performed by: STUDENT IN AN ORGANIZED HEALTH CARE EDUCATION/TRAINING PROGRAM

## 2025-07-31 PROCEDURE — 83735 ASSAY OF MAGNESIUM: CPT | Mod: HCNC | Performed by: STUDENT IN AN ORGANIZED HEALTH CARE EDUCATION/TRAINING PROGRAM

## 2025-07-31 PROCEDURE — 25000242 PHARM REV CODE 250 ALT 637 W/ HCPCS: Mod: HCNC | Performed by: STUDENT IN AN ORGANIZED HEALTH CARE EDUCATION/TRAINING PROGRAM

## 2025-07-31 PROCEDURE — 99900035 HC TECH TIME PER 15 MIN (STAT): Mod: HCNC

## 2025-07-31 PROCEDURE — 36415 COLL VENOUS BLD VENIPUNCTURE: CPT | Mod: HCNC | Performed by: STUDENT IN AN ORGANIZED HEALTH CARE EDUCATION/TRAINING PROGRAM

## 2025-07-31 PROCEDURE — 94640 AIRWAY INHALATION TREATMENT: CPT | Mod: HCNC

## 2025-07-31 PROCEDURE — 82374 ASSAY BLOOD CARBON DIOXIDE: CPT | Mod: HCNC | Performed by: STUDENT IN AN ORGANIZED HEALTH CARE EDUCATION/TRAINING PROGRAM

## 2025-07-31 PROCEDURE — 63600175 PHARM REV CODE 636 W HCPCS: Mod: HCNC | Performed by: STUDENT IN AN ORGANIZED HEALTH CARE EDUCATION/TRAINING PROGRAM

## 2025-07-31 PROCEDURE — 11000001 HC ACUTE MED/SURG PRIVATE ROOM: Mod: HCNC

## 2025-07-31 PROCEDURE — 63700000 PHARM REV CODE 250 ALT 637 W/O HCPCS: Mod: HCNC | Performed by: STUDENT IN AN ORGANIZED HEALTH CARE EDUCATION/TRAINING PROGRAM

## 2025-07-31 PROCEDURE — 25000003 PHARM REV CODE 250: Mod: HCNC | Performed by: STUDENT IN AN ORGANIZED HEALTH CARE EDUCATION/TRAINING PROGRAM

## 2025-07-31 RX ORDER — METHOCARBAMOL 500 MG/1
250 TABLET, FILM COATED ORAL 4 TIMES DAILY
Status: DISCONTINUED | OUTPATIENT
Start: 2025-07-31 | End: 2025-08-01 | Stop reason: HOSPADM

## 2025-07-31 RX ADMIN — FLUTICASONE FUROATE AND VILANTEROL TRIFENATATE 1 PUFF: 100; 25 POWDER RESPIRATORY (INHALATION) at 09:07

## 2025-07-31 RX ADMIN — ACETAMINOPHEN 650 MG: 325 TABLET ORAL at 09:07

## 2025-07-31 RX ADMIN — APIXABAN 5 MG: 5 TABLET, FILM COATED ORAL at 09:07

## 2025-07-31 RX ADMIN — ACETAMINOPHEN 650 MG: 325 TABLET ORAL at 06:07

## 2025-07-31 RX ADMIN — METHOCARBAMOL 250 MG: 500 TABLET ORAL at 09:07

## 2025-07-31 RX ADMIN — GABAPENTIN 300 MG: 300 CAPSULE ORAL at 09:07

## 2025-07-31 RX ADMIN — METHOCARBAMOL 250 MG: 500 TABLET ORAL at 06:07

## 2025-07-31 RX ADMIN — CLOPIDOGREL 75 MG: 75 TABLET ORAL at 08:07

## 2025-07-31 RX ADMIN — CEFTRIAXONE SODIUM 1 G: 1 INJECTION, POWDER, FOR SOLUTION INTRAMUSCULAR; INTRAVENOUS at 08:07

## 2025-07-31 RX ADMIN — APIXABAN 5 MG: 5 TABLET, FILM COATED ORAL at 08:07

## 2025-07-31 RX ADMIN — AZITHROMYCIN DIHYDRATE 500 MG: 250 TABLET ORAL at 08:07

## 2025-07-31 RX ADMIN — SEVELAMER CARBONATE 800 MG: 800 TABLET, FILM COATED ORAL at 08:07

## 2025-07-31 RX ADMIN — MIDODRINE HYDROCHLORIDE 5 MG: 5 TABLET ORAL at 02:07

## 2025-07-31 RX ADMIN — METHOCARBAMOL 250 MG: 500 TABLET ORAL at 03:07

## 2025-07-31 RX ADMIN — AMIODARONE HYDROCHLORIDE 200 MG: 200 TABLET ORAL at 08:07

## 2025-07-31 RX ADMIN — ATORVASTATIN CALCIUM 80 MG: 40 TABLET, FILM COATED ORAL at 08:07

## 2025-07-31 NOTE — ASSESSMENT & PLAN NOTE
Patient has long standing persistent (>12 months) atrial fibrillation. Patient is currently in sinus rhythm. [id number] Score: 2. The patients heart rate in the last 24 hours is as follows:  Pulse  Min: 59  Max: 98     Antiarrhythmics  amiodarone tablet 200 mg, Daily, Oral    Anticoagulants  apixaban tablet 5 mg, 2 times daily, Oral    Plan  - Replete lytes with a goal of K>4, Mg >2  - Patient is anticoagulated, see medications listed above.  - Patient's afib is currently controlled

## 2025-07-31 NOTE — ASSESSMENT & PLAN NOTE
Anemia is likely due to chronic disease due to ESRD. Most recent hemoglobin and hematocrit are listed below.  Recent Labs     07/29/25  1301 07/29/25  1641 07/30/25  0521 07/31/25  0836   HGB 10.0*  --  9.2* 10.5*   HCT 32.0* 33.3 28.9* 33.4*     Plan  - Monitor serial CBC: Daily  - Transfuse PRBC if patient becomes hemodynamically unstable, symptomatic or H/H drops below 7/21.  - Patient has not received any PRBC transfusions to date  - Patient's anemia is currently stable

## 2025-07-31 NOTE — PROGRESS NOTES
[facility] - Acute [facility] Medicine  Progress Note    Patient Name: Ibrhaima Phelps  MRN: [id number]  Patient Class: IP- Inpatient   Admission Date: 7/29/2025  Length of Stay: 2 days  Attending Physician: Juan Miller MD  Primary Care Provider: Anatoliy Colindres MD        Subjective     Principal Problem:Community acquired bacterial pneumonia        HPI:  Mr. Phelps is a 69-year-old male with history of CAD with PCI in 2006 & 2024 with a angiogram (7/22) showing significant 3 vessel disease, HFpEF, kidney transplant on dialysis, hypertension, atrial fibrillation, COPD (no home O2), pericarditis, and ESRD on T/R/Sa HD who presents with one day of dyspnea, fevers, chills.  Patient reports no sick contacts.  Last underwent routine HD without missing sessions, and due for dialysis on day of presentation.  Patient makes no urine.  Patient states he took reading treatment with mild improvement of symptoms.  Patient reports seeing black spots in his vision while ambulating at home symmetry previous episodes of low blood pressure.      In the ED, BP 82/47, HR 69, afebrile, and on room air.  Labs notable for WBC 8.9, hemoglobin 10, sodium 135, potassium 4.7, calcium 7.1, and per BNP 9716, flu/COVID negative, and lactic acid 2.6.  Chest x-ray with right perihilar and lower lobe infiltrate noted.  Given ceftriaxone and azithromycin in the ED. while in the ED, patient noted to have new onset diffuse intermittent body tremors.      Overview/Hospital Course:  No notes on file    Interval History:   HD overnight. Patient woke up this morning with chest pain worse with breathing. CXR ordered. Robaxin ordered.      Review of Systems   All other systems reviewed and are negative.    Objective:     Vital Signs (Most Recent):  Temp: 99.3 °F (37.4 °C) (07/31/25 0739)  Pulse: 72 (07/31/25 0911)  Resp: 17 (07/31/25 0911)  BP: 122/66 (07/31/25 0739)  SpO2: 96 % (07/31/25 0911) Vital Signs (24h Range):  Temp:  [98.5 °F  (36.9 °C)-99.3 °F (37.4 °C)] 99.3 °F (37.4 °C)  Pulse:  [59-98] 72  Resp:  [17-20] 17  SpO2:  [93 %-98 %] 96 %  BP: (122-179)/(59-79) 122/66     Weight: 92.5 kg (204 lb)  Body mass index is 26.91 kg/m².    Intake/Output Summary (Last 24 hours) at 7/31/2025 1115  Last data filed at 7/31/2025 0613  Gross per 24 hour   Intake 500 ml   Output 3500 ml   Net -3000 ml         Physical Exam  Constitutional:       Appearance: Normal appearance. He is normal weight. He is ill-appearing.   HENT:      Head: Normocephalic and atraumatic.      Mouth/Throat:      Mouth: Mucous membranes are moist.   Eyes:      Extraocular Movements: Extraocular movements intact.      Conjunctiva/sclera: Conjunctivae normal.   Cardiovascular:      Rate and Rhythm: Normal rate and regular rhythm.      Heart sounds: No murmur heard.  Pulmonary:      Effort: Pulmonary effort is normal. No respiratory distress.      Breath sounds: No wheezing or rales.   Chest:      Chest wall: Tenderness present.   Abdominal:      General: Abdomen is flat. There is no distension.      Palpations: Abdomen is soft.      Tenderness: There is no abdominal tenderness. There is no guarding.   Musculoskeletal:         General: No swelling or tenderness.   Skin:     Findings: No rash.   Neurological:      General: No focal deficit present.      Mental Status: He is oriented to person, place, and time. Mental status is at baseline.      Motor: Tremor (Periodic intermittent whole-body tremor) present.   Psychiatric:         Mood and Affect: Mood normal.         Behavior: Behavior normal.               Significant Labs: All pertinent labs within the past 24 hours have been reviewed.  CBC:   Recent Labs   Lab 07/29/25  1301 07/29/25  1641 07/30/25  0521 07/31/25  0836   WBC 8.92  --  12.12 8.53   HGB 10.0*  --  9.2* 10.5*   HCT 32.0* 33.3 28.9* 33.4*   *  --  124* 156     CMP:   Recent Labs   Lab 07/29/25  1301 07/29/25  1635 07/30/25  0521 07/31/25  0836    135*  135* 140   K 4.7 4.7 4.8 4.1    104 104 104   CO2 20* 19* 19* 26   GLU 82 78 70 72   BUN 63* 67* 73* 31*   CREATININE 11.6* 11.6* 12.4* 7.1*   CALCIUM 7.2* 7.1* 7.0* 7.8*   PROT 6.2  --  5.4*  --    ALBUMIN 2.8* 2.6* 2.4* 2.6*   BILITOT 0.4  --  0.3  --    ALKPHOS 95  --  100  --    AST 33  --  20  --    ALT 15  --  9  --    ANIONGAP 15 12 12 10       Significant Imaging: I have reviewed all pertinent imaging results/findings within the past 24 hours.      Assessment & Plan  Community acquired bacterial pneumonia  SOB (shortness of breath)  Patient presenting with 1 day of dyspnea, fevers, chills, and hypotension concerning for sepsis related to bacterial pneumonia    - Ceftriaxone, azithromycin   - BCx NGTD  - Pulmonary toilet  - Likely MSK vs pleuritic chest pain 7/31  - CXR ordered  - Robaxin ordered    Primary hypertension  Patient's blood pressure range in the last 24 hours was: BP  Min: 122/66  Max: 179/78.The patient's inpatient anti-hypertensive regimen is listed below:  Current Antihypertensives  torsemide tablet 40 mg, Three times weekly, Oral    Plan  - BP is controlled, no changes needed to their regimen    Postural tremor  Patient with new onset tremor in the ED that he and wife says is new and has not occurred before.    - Resolved    Stented coronary artery  Mixed hyperlipidemia  Patient with known CAD s/p stent placement, which is controlled Will continue Plavix and Statin and monitor for S/Sx of angina/ACS. Continue to monitor on telemetry.       ESRD (end stage renal disease)  Creatine stable for now. BMP reviewed- noted Estimated Creatinine Clearance: 10.9 mL/min (A) (based on SCr of 7.1 mg/dL (H)). according to latest data. Based on current GFR, CKD stage is end stage.  Monitor UOP and serial BMP and adjust therapy as needed. Renally dose meds. Avoid nephrotoxic medications and procedures.    - Nephrology consulted for HD    GERD (gastroesophageal reflux disease)  Continue home  medication    Anemia in chronic kidney disease  Anemia is likely due to chronic disease due to ESRD. Most recent hemoglobin and hematocrit are listed below.  Recent Labs     07/29/25  1301 07/29/25  1641 07/30/25  0521 07/31/25  0836   HGB 10.0*  --  9.2* 10.5*   HCT 32.0* 33.3 28.9* 33.4*     Plan  - Monitor serial CBC: Daily  - Transfuse PRBC if patient becomes hemodynamically unstable, symptomatic or H/H drops below 7/21.  - Patient has not received any PRBC transfusions to date  - Patient's anemia is currently stable    Hemodialysis-associated hypotension  As needed midodrine    Paroxysmal atrial fibrillation  Patient has long standing persistent (>12 months) atrial fibrillation. Patient is currently in sinus rhythm. [id number] Score: 2. The patients heart rate in the last 24 hours is as follows:  Pulse  Min: 59  Max: 98     Antiarrhythmics  amiodarone tablet 200 mg, Daily, Oral    Anticoagulants  apixaban tablet 5 mg, 2 times daily, Oral    Plan  - Replete lytes with a goal of K>4, Mg >2  - Patient is anticoagulated, see medications listed above.  - Patient's afib is currently controlled    Hyponatremia  - Noted    VTE Risk Mitigation (From admission, onward)           Ordered     apixaban tablet 5 mg  2 times daily         07/29/25 1740     IP VTE HIGH RISK PATIENT  Once         07/29/25 1531     Place sequential compression device  Until discontinued         07/29/25 1531                    Discharge Planning   RAMU: 8/1/2025     Code Status: Full Code   Medical Readiness for Discharge Date:   Discharge Plan A: Home with family                        Juan Miller MD  Department of Hospital Medicine   [facility] - Acute Medical Stepdown

## 2025-07-31 NOTE — ASSESSMENT & PLAN NOTE
Creatine stable for now. BMP reviewed- noted Estimated Creatinine Clearance: 10.9 mL/min (A) (based on SCr of 7.1 mg/dL (H)). according to latest data. Based on current GFR, CKD stage is end stage.  Monitor UOP and serial BMP and adjust therapy as needed. Renally dose meds. Avoid nephrotoxic medications and procedures.    - Nephrology consulted for HD

## 2025-07-31 NOTE — PLAN OF CARE
Patient aaox4, complaints of chest pain and tylenol given. Patient due meds administered, patient toleration well. Plan of care reviewed with patient and patient verbalizes understanding. Safety measures ongoing, call light in place. Patient rounded per facility's protocol. No other concerns at this time.                 Problem: Hospitalized Older Adult  Goal: Optimal Cognitive Function  Outcome: Progressing  Goal: Effective Bowel Elimination  Outcome: Progressing  Goal: Optimal Coping  Outcome: Progressing  Goal: Fluid and Electrolyte Balance  Outcome: Progressing  Goal: Optimal Functional Ability  Outcome: Progressing  Goal: Improved Oral Intake  Outcome: Progressing  Goal: Adequate Sleep/Rest  Outcome: Progressing  Goal: Effective Urinary Elimination  Outcome: Progressing     Problem: Adult Inpatient Plan of Care  Goal: Plan of Care Review  Outcome: Progressing  Goal: Patient-Specific Goal (Individualized)  Outcome: Progressing  Goal: Absence of Hospital-Acquired Illness or Injury  Outcome: Progressing  Goal: Optimal Comfort and Wellbeing  Outcome: Progressing  Goal: Readiness for Transition of Care  Outcome: Progressing     Problem: Sepsis/Septic Shock  Goal: Optimal Coping  Outcome: Progressing  Goal: Absence of Bleeding  Outcome: Progressing  Goal: Blood Glucose Level Within Targeted Range  Outcome: Progressing  Goal: Absence of Infection Signs and Symptoms  Outcome: Progressing  Goal: Optimal Nutrition Intake  Outcome: Progressing     Problem: Wound  Goal: Optimal Coping  Outcome: Progressing  Goal: Optimal Functional Ability  Outcome: Progressing  Goal: Absence of Infection Signs and Symptoms  Outcome: Progressing  Goal: Improved Oral Intake  Outcome: Progressing  Goal: Optimal Pain Control and Function  Outcome: Progressing  Goal: Skin Health and Integrity  Outcome: Progressing  Goal: Optimal Wound Healing  Outcome: Progressing     Problem: Hemodialysis  Goal: Safe, Effective Therapy Delivery  Outcome:  Progressing  Goal: Effective Tissue Perfusion  Outcome: Progressing  Goal: Absence of Infection Signs and Symptoms  Outcome: Progressing

## 2025-07-31 NOTE — ASSESSMENT & PLAN NOTE
Patient presenting with 1 day of dyspnea, fevers, chills, and hypotension concerning for sepsis related to bacterial pneumonia    - Ceftriaxone, azithromycin   - BCx NGTD  - Pulmonary toilet  - Likely MSK vs pleuritic chest pain 7/31  - CXR ordered  - Robaxin ordered

## 2025-07-31 NOTE — PLAN OF CARE
07/31/25 1127   Discharge Assessment   Assessment Type Discharge Planning Assessment   Confirmed/corrected address, phone number and insurance Yes   Confirmed Demographics Correct on Facesheet   Source of Information patient   Communicated RAMU with patient/caregiver Yes   People in Home spouse   Name(s) of People in Home [facility] Arrived From: Home   Do you expect to return to your current living situation? Yes   Do you have help at home or someone to help you manage your care at home? No   Prior to hospitilization cognitive status: Alert/Oriented   Current cognitive status: Alert/Oriented   Walking or Climbing Stairs Difficulty no   Dressing/Bathing Difficulty no   Equipment Currently Used at Home none   Readmission within 30 days? Yes   Patient currently being followed by outpatient case management? No   Do you currently have service(s) that help you manage your care at home? No   Do you take prescription medications? Yes   Do you have prescription coverage? Yes   Do you have any problems affording any of your prescribed medications? No   Is the patient taking medications as prescribed? yes   Who is going to help you get home at discharge? Maisha Valdivia   How do you get to doctors appointments? car, drives self   Are you on dialysis? Yes   Dialysis Name and Scheduled days TTS   Do you take coumadin? No   Discharge Plan A Home with family   Discharge Plan B Home Health   DME Needed Upon Discharge  none   Discharge Plan discussed with: Patient   Transition of Care Barriers None   Health Literacy   How often do you need to have someone help you when you read instructions, pamphlets, or other written material from your doctor or pharmacy? Sometimes     Patient is independent at baseline and denies discharge needs.    Discharge Plan A and Plan B have been determined by review of patient's clinical status, future medical and therapeutic needs, and coverage/benefits for post-acute care in coordination with  multidisciplinary team members.      Miky Valle, RN

## 2025-07-31 NOTE — PROGRESS NOTES
07/31/25 0613   Post-Hemodialysis Assessment   Rinseback Volume (mL) 200 mL   Blood Volume Processed (Liters) 79.8 L   Dialyzer Clearance Lightly streaked   Duration of Treatment 210 minutes   Additional Fluid Intake (mL) 500 mL   Total UF (mL) 3500 mL   Net Fluid Removal 3000   Patient Response to Treatment chelsea well   Post-Hemodialysis Comments stable     3.5 hrs of HD completed w/ 3 liters of UF off. Needles removed and hemostasis achieved. Report given to primary RN.

## 2025-07-31 NOTE — PLAN OF CARE
Patient resting in bed at this time. Alert and oriented. Able to make needs known. No complaints at this time. Respirations even and unlabored. Lung sounds clear. Heart rate and rhythm regular. Abdomen soft, nontender, nondistended. Bowel sounds present. Patient received dialysis at bedside this shift, ongoing. Able to move all extremities. Integumentary system clean, dry, intact. Continent of bowel, anuric. VSS. NAD.    Problem: Hospitalized Older Adult  Goal: Optimal Cognitive Function  Outcome: Progressing  Goal: Effective Bowel Elimination  Outcome: Progressing  Goal: Optimal Coping  Outcome: Progressing  Goal: Fluid and Electrolyte Balance  Outcome: Progressing  Goal: Optimal Functional Ability  Outcome: Progressing  Goal: Improved Oral Intake  Outcome: Progressing  Goal: Adequate Sleep/Rest  Outcome: Progressing  Goal: Effective Urinary Elimination  Outcome: Progressing     Problem: Adult Inpatient Plan of Care  Goal: Plan of Care Review  Outcome: Progressing  Goal: Patient-Specific Goal (Individualized)  Outcome: Progressing  Goal: Absence of Hospital-Acquired Illness or Injury  Outcome: Progressing  Goal: Optimal Comfort and Wellbeing  Outcome: Progressing  Goal: Readiness for Transition of Care  Outcome: Progressing     Problem: Sepsis/Septic Shock  Goal: Optimal Coping  Outcome: Progressing  Goal: Absence of Bleeding  Outcome: Progressing  Goal: Blood Glucose Level Within Targeted Range  Outcome: Progressing  Goal: Absence of Infection Signs and Symptoms  Outcome: Progressing  Goal: Optimal Nutrition Intake  Outcome: Progressing     Problem: Wound  Goal: Optimal Coping  Outcome: Progressing  Goal: Optimal Functional Ability  Outcome: Progressing  Goal: Absence of Infection Signs and Symptoms  Outcome: Progressing  Goal: Improved Oral Intake  Outcome: Progressing  Goal: Optimal Pain Control and Function  Outcome: Progressing  Goal: Skin Health and Integrity  Outcome: Progressing  Goal: Optimal Wound  Healing  Outcome: Progressing     Problem: Hemodialysis  Goal: Safe, Effective Therapy Delivery  Outcome: Progressing  Goal: Effective Tissue Perfusion  Outcome: Progressing  Goal: Absence of Infection Signs and Symptoms  Outcome: Progressing

## 2025-07-31 NOTE — PROGRESS NOTES
07/31/25 0210   During Hemodialysis Assessment   Blood Flow Rate (mL/min) 400 mL/min   Dialysate Flow Rate (mL/min) 800 ml/min   Ultrafiltration Rate (mL/Hr) 680 mL/Hr   Arteriovenous Lines Secure Yes   Arterial Pressure (mmHg) 140 mmHg   Venous Pressure (mmHg) 180   Blood Volume Processed (Liters) 0 L   UF Removed (mL) 0 mL   TMP 40   Venous Line in Air Detector Yes   Intake (mL) 200 mL   Intra-Hemodialysis Comments HD initated     Report received bfrom primary RN. HD started via MIRA fistula w/ 15 ga needles per MD order

## 2025-07-31 NOTE — ASSESSMENT & PLAN NOTE
Patient's blood pressure range in the last 24 hours was: BP  Min: 122/66  Max: 179/78.The patient's inpatient anti-hypertensive regimen is listed below:  Current Antihypertensives  torsemide tablet 40 mg, Three times weekly, Oral    Plan  - BP is controlled, no changes needed to their regimen

## 2025-07-31 NOTE — SUBJECTIVE & OBJECTIVE
Interval History:   HD overnight. Patient woke up this morning with chest pain worse with breathing. CXR ordered. Robaxin ordered.      Review of Systems   All other systems reviewed and are negative.    Objective:     Vital Signs (Most Recent):  Temp: 99.3 °F (37.4 °C) (07/31/25 0739)  Pulse: 72 (07/31/25 0911)  Resp: 17 (07/31/25 0911)  BP: 122/66 (07/31/25 0739)  SpO2: 96 % (07/31/25 0911) Vital Signs (24h Range):  Temp:  [98.5 °F (36.9 °C)-99.3 °F (37.4 °C)] 99.3 °F (37.4 °C)  Pulse:  [59-98] 72  Resp:  [17-20] 17  SpO2:  [93 %-98 %] 96 %  BP: (122-179)/(59-79) 122/66     Weight: 92.5 kg (204 lb)  Body mass index is 26.91 kg/m².    Intake/Output Summary (Last 24 hours) at 7/31/2025 1115  Last data filed at 7/31/2025 0613  Gross per 24 hour   Intake 500 ml   Output 3500 ml   Net -3000 ml         Physical Exam  Constitutional:       Appearance: Normal appearance. He is normal weight. He is ill-appearing.   HENT:      Head: Normocephalic and atraumatic.      Mouth/Throat:      Mouth: Mucous membranes are moist.   Eyes:      Extraocular Movements: Extraocular movements intact.      Conjunctiva/sclera: Conjunctivae normal.   Cardiovascular:      Rate and Rhythm: Normal rate and regular rhythm.      Heart sounds: No murmur heard.  Pulmonary:      Effort: Pulmonary effort is normal. No respiratory distress.      Breath sounds: No wheezing or rales.   Chest:      Chest wall: Tenderness present.   Abdominal:      General: Abdomen is flat. There is no distension.      Palpations: Abdomen is soft.      Tenderness: There is no abdominal tenderness. There is no guarding.   Musculoskeletal:         General: No swelling or tenderness.   Skin:     Findings: No rash.   Neurological:      General: No focal deficit present.      Mental Status: He is oriented to person, place, and time. Mental status is at baseline.      Motor: Tremor (Periodic intermittent whole-body tremor) present.   Psychiatric:         Mood and Affect: Mood  normal.         Behavior: Behavior normal.               Significant Labs: All pertinent labs within the past 24 hours have been reviewed.  CBC:   Recent Labs   Lab 07/29/25  1301 07/29/25  1641 07/30/25  0521 07/31/25  0836   WBC 8.92  --  12.12 8.53   HGB 10.0*  --  9.2* 10.5*   HCT 32.0* 33.3 28.9* 33.4*   *  --  124* 156     CMP:   Recent Labs   Lab 07/29/25  1301 07/29/25  1635 07/30/25  0521 07/31/25  0836    135* 135* 140   K 4.7 4.7 4.8 4.1    104 104 104   CO2 20* 19* 19* 26   GLU 82 78 70 72   BUN 63* 67* 73* 31*   CREATININE 11.6* 11.6* 12.4* 7.1*   CALCIUM 7.2* 7.1* 7.0* 7.8*   PROT 6.2  --  5.4*  --    ALBUMIN 2.8* 2.6* 2.4* 2.6*   BILITOT 0.4  --  0.3  --    ALKPHOS 95  --  100  --    AST 33  --  20  --    ALT 15  --  9  --    ANIONGAP 15 12 12 10       Significant Imaging: I have reviewed all pertinent imaging results/findings within the past 24 hours.

## 2025-07-31 NOTE — PLAN OF CARE
[facility] Care Support Interaction      I have reviewed the chart of Ibrahima Phelps who is hospitalized for Community acquired bacterial pneumonia. The patient is currently located in the following unit: [facility]    I have seen and examined the patient and provided the following support:     Readmission Reduction - Acute Care at Home Referral       Elvia Snell PA-C  [facility]

## 2025-08-01 ENCOUNTER — EPISODE CHANGES (OUTPATIENT)
Dept: CARDIOLOGY | Facility: CLINIC | Age: 70
End: 2025-08-01

## 2025-08-01 ENCOUNTER — DOCUMENTATION ONLY (OUTPATIENT)
Dept: CARDIOLOGY | Facility: CLINIC | Age: 70
End: 2025-08-01
Payer: MEDICARE

## 2025-08-01 VITALS
HEART RATE: 59 BPM | DIASTOLIC BLOOD PRESSURE: 74 MMHG | OXYGEN SATURATION: 100 % | TEMPERATURE: 98 F | RESPIRATION RATE: 16 BRPM | WEIGHT: 204 LBS | BODY MASS INDEX: 27.04 KG/M2 | SYSTOLIC BLOOD PRESSURE: 164 MMHG | HEIGHT: 73 IN

## 2025-08-01 LAB
ABSOLUTE EOSINOPHIL (OHS): 0.77 K/UL
ABSOLUTE MONOCYTE (OHS): 0.91 K/UL (ref 0.3–1)
ABSOLUTE NEUTROPHIL COUNT (OHS): 4.74 K/UL (ref 1.8–7.7)
ALBUMIN SERPL BCP-MCNC: 2.5 G/DL (ref 3.5–5.2)
ANION GAP (OHS): 11 MMOL/L (ref 8–16)
BASOPHILS # BLD AUTO: 0.02 K/UL
BASOPHILS NFR BLD AUTO: 0.3 %
BUN SERPL-MCNC: 39 MG/DL (ref 8–23)
CALCIUM SERPL-MCNC: 7.6 MG/DL (ref 8.7–10.5)
CHLORIDE SERPL-SCNC: 102 MMOL/L (ref 95–110)
CO2 SERPL-SCNC: 25 MMOL/L (ref 23–29)
CREAT SERPL-MCNC: 9.1 MG/DL (ref 0.5–1.4)
ERYTHROCYTE [DISTWIDTH] IN BLOOD BY AUTOMATED COUNT: 17.5 % (ref 11.5–14.5)
GFR SERPLBLD CREATININE-BSD FMLA CKD-EPI: 6 ML/MIN/1.73/M2
GLUCOSE SERPL-MCNC: 66 MG/DL (ref 70–110)
HCT VFR BLD AUTO: 32.2 % (ref 40–54)
HGB BLD-MCNC: 10.1 GM/DL (ref 14–18)
IMM GRANULOCYTES # BLD AUTO: 0.05 K/UL (ref 0–0.04)
IMM GRANULOCYTES NFR BLD AUTO: 0.7 % (ref 0–0.5)
LYMPHOCYTES # BLD AUTO: 1.18 K/UL (ref 1–4.8)
MAGNESIUM SERPL-MCNC: 2.2 MG/DL (ref 1.6–2.6)
MCH RBC QN AUTO: 26.9 PG (ref 27–31)
MCHC RBC AUTO-ENTMCNC: 31.4 G/DL (ref 32–36)
MCV RBC AUTO: 86 FL (ref 82–98)
NUCLEATED RBC (/100WBC) (OHS): 0 /100 WBC
PHOSPHATE SERPL-MCNC: 4.4 MG/DL (ref 2.7–4.5)
PLATELET # BLD AUTO: 138 K/UL (ref 150–450)
PMV BLD AUTO: 12.2 FL (ref 9.2–12.9)
POTASSIUM SERPL-SCNC: 4.5 MMOL/L (ref 3.5–5.1)
RBC # BLD AUTO: 3.76 M/UL (ref 4.6–6.2)
RELATIVE EOSINOPHIL (OHS): 10 %
RELATIVE LYMPHOCYTE (OHS): 15.4 % (ref 18–48)
RELATIVE MONOCYTE (OHS): 11.9 % (ref 4–15)
RELATIVE NEUTROPHIL (OHS): 61.7 % (ref 38–73)
SODIUM SERPL-SCNC: 138 MMOL/L (ref 136–145)
WBC # BLD AUTO: 7.67 K/UL (ref 3.9–12.7)

## 2025-08-01 PROCEDURE — 25000003 PHARM REV CODE 250: Mod: HCNC | Performed by: STUDENT IN AN ORGANIZED HEALTH CARE EDUCATION/TRAINING PROGRAM

## 2025-08-01 PROCEDURE — 36415 COLL VENOUS BLD VENIPUNCTURE: CPT | Mod: HCNC | Performed by: STUDENT IN AN ORGANIZED HEALTH CARE EDUCATION/TRAINING PROGRAM

## 2025-08-01 PROCEDURE — 94761 N-INVAS EAR/PLS OXIMETRY MLT: CPT | Mod: HCNC

## 2025-08-01 PROCEDURE — 80069 RENAL FUNCTION PANEL: CPT | Mod: HCNC | Performed by: STUDENT IN AN ORGANIZED HEALTH CARE EDUCATION/TRAINING PROGRAM

## 2025-08-01 PROCEDURE — 83735 ASSAY OF MAGNESIUM: CPT | Mod: HCNC | Performed by: STUDENT IN AN ORGANIZED HEALTH CARE EDUCATION/TRAINING PROGRAM

## 2025-08-01 PROCEDURE — 85025 COMPLETE CBC W/AUTO DIFF WBC: CPT | Mod: HCNC | Performed by: STUDENT IN AN ORGANIZED HEALTH CARE EDUCATION/TRAINING PROGRAM

## 2025-08-01 PROCEDURE — 94640 AIRWAY INHALATION TREATMENT: CPT | Mod: HCNC

## 2025-08-01 PROCEDURE — 63600175 PHARM REV CODE 636 W HCPCS: Mod: HCNC | Performed by: STUDENT IN AN ORGANIZED HEALTH CARE EDUCATION/TRAINING PROGRAM

## 2025-08-01 RX ADMIN — ATORVASTATIN CALCIUM 80 MG: 40 TABLET, FILM COATED ORAL at 09:08

## 2025-08-01 RX ADMIN — CLOPIDOGREL 75 MG: 75 TABLET ORAL at 09:08

## 2025-08-01 RX ADMIN — TORSEMIDE 40 MG: 20 TABLET ORAL at 09:08

## 2025-08-01 RX ADMIN — FAMOTIDINE 20 MG: 20 TABLET, FILM COATED ORAL at 09:08

## 2025-08-01 RX ADMIN — SEVELAMER CARBONATE 800 MG: 800 TABLET, FILM COATED ORAL at 09:08

## 2025-08-01 RX ADMIN — AMIODARONE HYDROCHLORIDE 200 MG: 200 TABLET ORAL at 09:08

## 2025-08-01 RX ADMIN — CEFTRIAXONE SODIUM 1 G: 1 INJECTION, POWDER, FOR SOLUTION INTRAMUSCULAR; INTRAVENOUS at 09:08

## 2025-08-01 RX ADMIN — METHOCARBAMOL 250 MG: 500 TABLET ORAL at 09:08

## 2025-08-01 RX ADMIN — FLUTICASONE FUROATE AND VILANTEROL TRIFENATATE 1 PUFF: 100; 25 POWDER RESPIRATORY (INHALATION) at 08:08

## 2025-08-01 RX ADMIN — APIXABAN 5 MG: 5 TABLET, FILM COATED ORAL at 09:08

## 2025-08-01 NOTE — DISCHARGE SUMMARY
[facility] - [facility] Medicine  Discharge Summary      Patient Name: Ibrahima Phelps  MRN: [id number]  SHAVONNE: [id number]  Patient Class: IP- Inpatient  Admission Date: 7/29/2025  Hospital Length of Stay: 3 days  Discharge Date and Time: 8/1/2025  6:12 PM  Attending Physician: No att. providers found   Discharging Provider: Juan Miller MD  Primary Care Provider: Anatoliy Colindres MD  Hospital Medicine Team: [facility]  Juan Miller MD  Primary Care Team: [facility]    HPI:   Mr. Phelps is a 69-year-old male with history of CAD with PCI in 2006 & 2024 with a angiogram (7/22) showing significant 3 vessel disease, HFpEF, kidney transplant on dialysis, hypertension, atrial fibrillation, COPD (no home O2), pericarditis, and ESRD on T/R/Sa HD who presents with one day of dyspnea, fevers, chills.  Patient reports no sick contacts.  Last underwent routine HD without missing sessions, and due for dialysis on day of presentation.  Patient makes no urine.  Patient states he took reading treatment with mild improvement of symptoms.  Patient reports seeing black spots in his vision while ambulating at home symmetry previous episodes of low blood pressure.      In the ED, BP 82/47, HR 69, afebrile, and on room air.  Labs notable for WBC 8.9, hemoglobin 10, sodium 135, potassium 4.7, calcium 7.1, and per BNP 9716, flu/COVID negative, and lactic acid 2.6.  Chest x-ray with right perihilar and lower lobe infiltrate noted.  Given ceftriaxone and azithromycin in the ED. while in the ED, patient noted to have new onset diffuse intermittent body tremors.      * No surgery found *      Hospital Course:   Community acquired bacterial pneumonia  SOB (shortness of breath)  Patient presenting with 1 day of dyspnea, fevers, chills, and hypotension concerning for sepsis related to bacterial pneumonia     - Ceftriaxone, azithromycin   - BCx NGTD  - Pulmonary toilet  - Likely MSK vs pleuritic chest pain 7/31,  resolved  - CXR with bilateral interstitial and airspace opacities  - Dialysis for volume removal  -  Robaxin ordered      Tremor  Patient with new onset consistent sporadic tremor in the ED that he and wife says is new and has not occurred before.  - Resolved morning after admission  - Outpatient neurology follow-up      Patient deemed appropriate for discharge. I personally saw, examined, and evaluated patient prior to departure. Plan discussed with patient, who was agreeable and amenable; medications were discussed and reviewed, outpatient follow-up scheduled, ER precautions were given, all questions were answered to the patient's satisfaction, and Ibrahima Phelps was subsequently discharged.       Goals of Care Treatment Preferences:  Code Status: Full Code    Health care agent: Pt's wife is NOK  Health care agent number: No value filed.                      Consults:   Consults (From admission, onward)          Status Ordering Provider     Inpatient consult to Nephrology  Once        Provider:  (Not yet assigned)    Completed VELASQUEZ FABIAN            Assessment & Plan  Community acquired bacterial pneumonia  SOB (shortness of breath)  Patient presenting with 1 day of dyspnea, fevers, chills, and hypotension concerning for sepsis related to bacterial pneumonia    - Ceftriaxone, azithromycin   - BCx NGTD  - Pulmonary toilet  - Likely MSK vs pleuritic chest pain 7/31  - CXR ordered  - Robaxin ordered    Primary hypertension  Patient's blood pressure range in the last 24 hours was: BP  Min: 139/61  Max: 164/74.The patient's inpatient anti-hypertensive regimen is listed below:  Current Antihypertensives  torsemide tablet 40 mg, Three times weekly, Oral    Plan  - BP is controlled, no changes needed to their regimen    Postural tremor  Patient with new onset tremor in the ED that he and wife says is new and has not occurred before.    - Resolved    Stented coronary artery  Mixed hyperlipidemia  Patient with known  CAD s/p stent placement, which is controlled Will continue Plavix and Statin and monitor for S/Sx of angina/ACS. Continue to monitor on telemetry.       ESRD (end stage renal disease)  Creatine stable for now. BMP reviewed- noted Estimated Creatinine Clearance: 8.5 mL/min (A) (based on SCr of 9.1 mg/dL (H)). according to latest data. Based on current GFR, CKD stage is end stage.  Monitor UOP and serial BMP and adjust therapy as needed. Renally dose meds. Avoid nephrotoxic medications and procedures.    - Nephrology consulted for HD    GERD (gastroesophageal reflux disease)  Continue home medication    Anemia in chronic kidney disease  Anemia is likely due to chronic disease due to ESRD. Most recent hemoglobin and hematocrit are listed below.  Recent Labs     07/30/25  0521 07/31/25  0836 08/01/25  0508   HGB 9.2* 10.5* 10.1*   HCT 28.9* 33.4* 32.2*     Plan  - Monitor serial CBC: Daily  - Transfuse PRBC if patient becomes hemodynamically unstable, symptomatic or H/H drops below 7/21.  - Patient has not received any PRBC transfusions to date  - Patient's anemia is currently stable    Hemodialysis-associated hypotension  As needed midodrine    Paroxysmal atrial fibrillation  Patient has long standing persistent (>12 months) atrial fibrillation. Patient is currently in sinus rhythm. [id number] Score: 2. The patients heart rate in the last 24 hours is as follows:  Pulse  Min: 55  Max: 72     Antiarrhythmics  amiodarone tablet 200 mg, Daily, Oral    Anticoagulants  apixaban tablet 5 mg, 2 times daily, Oral    Plan  - Replete lytes with a goal of K>4, Mg >2  - Patient is anticoagulated, see medications listed above.  - Patient's afib is currently controlled    Hyponatremia  - Noted    Final Active Diagnoses:    Diagnosis Date Noted POA    PRINCIPAL PROBLEM:  Community acquired bacterial pneumonia [J15.9] 05/20/2025 Yes    SOB (shortness of breath) [R06.02] 04/30/2015 Yes    Hyponatremia [E87.1] 07/30/2025 Yes     Hemodialysis-associated hypotension [I95.3] 05/06/2025 Yes    Stented coronary artery [Z95.5] 09/12/2024 Not Applicable    Anemia in chronic kidney disease [N18.9, D63.1] 02/26/2024 Yes    GERD (gastroesophageal reflux disease) [K21.9] 02/19/2024 Yes    ESRD (end stage renal disease) [N18.6] 02/02/2024 Yes    Postural tremor [G25.2] 09/16/2021 Yes    Paroxysmal atrial fibrillation [I48.0] 09/25/2017 Yes     Chronic    Primary hypertension [I10]  Yes    Mixed hyperlipidemia [E78.2]  Yes     Chronic    Chest pain in patient with CAD (coronary artery disease) [I25.10]  Yes     Chronic      Problems Resolved During this Admission:       Discharged Condition: good    Disposition: Home or Self Care    Follow Up:   Follow-up Information       ProviderMagdiel .    Specialty: Internal Medicine  Contact information:  [address] [facility] [address]  [facility] [address]                           Patient Instructions:      Ambulatory referral/consult to [facility]   Standing Status: Future   Referral Priority: Routine Referral Type: Consultation   Referred to Provider: MAGDIEL PROVIDER Requested Specialty: Internal Medicine   Number of Visits Requested: 1     Ambulatory referral/consult to Internal Medicine   Standing Status: Future   Referral Priority: Routine Referral Type: Consultation   Referral Reason: Specialty Services Required   Requested Specialty: Internal Medicine   Number of Visits Requested: 1     ACCEPT - Ambulatory referral/consult to [facility]   Standing Status: Future   Referral Priority: Routine Referral Type: Consultation   Referral Reason: Specialty Services Required   Requested Specialty: Cardiology   Number of Visits Requested: 1     Diet renal     Notify your health care provider if you experience any of the following:  increased confusion or weakness     Notify your health care provider if you experience any of the following:  persistent dizziness,  light-headedness, or visual disturbances     Notify your health care provider if you experience any of the following:  worsening rash     Notify your health care provider if you experience any of the following:  severe persistent headache     Notify your health care provider if you experience any of the following:  difficulty breathing or increased cough     Notify your health care provider if you experience any of the following:  redness, tenderness, or signs of infection (pain, swelling, redness, odor or green/yellow discharge around incision site)     Notify your health care provider if you experience any of the following:  severe uncontrolled pain     Notify your health care provider if you experience any of the following:  persistent nausea and vomiting or diarrhea     Notify your health care provider if you experience any of the following:  temperature >100.4     Activity as tolerated       Significant Diagnostic Studies: Labs: All labs within the past 24 hours have been reviewed    Pending Diagnostic Studies:       None           Medications:  Reconciled Home Medications:      Medication List        CONTINUE taking these medications      acetaminophen 500 MG tablet  Commonly known as: TYLENOL  Take 1 tablet (500 mg total) by mouth every 6 (six) hours as needed for Pain.     albuterol 90 mcg/actuation inhaler  Commonly known as: VENTOLIN HFA  Inhale 2 puffs into the lungs every 6 (six) hours as needed for Wheezing or Shortness of Breath. Rescue     albuterol-ipratropium 2.5 mg-0.5 mg/3 mL nebulizer solution  Commonly known as: DUO-NEB  Take 3 mLs by nebulization every 4 (four) hours as needed for Wheezing or Shortness of Breath. Rescue     ALLEGRA ORAL  Take 1 tablet by mouth daily as needed (allergies).     amiodarone 200 MG Tab  Commonly known as: PACERONE  TAKE 1 TABLET BY MOUTH EVERY DAY     atorvastatin 80 MG tablet  Commonly known as: LIPITOR  Take 1 tablet (80 mg total) by mouth once daily.     b  complex vitamins tablet  Commonly known as: B COMPLEX-VITAMIN B12  Take 1 tablet by mouth once daily.     CENTRUM SILVER 0.4 mg-300 mcg- 250 mcg Tab  Generic drug: multivit-min-FA-lycopen-lutein  Take 1 tablet by mouth once daily.     clopidogreL 75 mg tablet  Commonly known as: PLAVIX  Take 1 tablet (75 mg total) by mouth once daily.     ELIQUIS 5 mg Tab  Generic drug: apixaban  TAKE 1 TABLET BY MOUTH TWICE A DAY     famotidine 20 MG tablet  Commonly known as: PEPCID  Take 1 tablet (20 mg total) by mouth once daily. On dialysis days, please take this medicine after dialysis. Only taking on Tue, Thur, and  Sat.     fluticasone furoate-vilanteroL 100-25 mcg/dose diskus inhaler  Commonly known as: BREO  Inhale 1 puff into the lungs once daily. Controller. Use this inhaler every day.     fluticasone propionate 50 mcg/actuation nasal spray  Commonly known as: FLONASE  1 spray (50 mcg total) by Each Nostril route daily as needed for Allergies.     gabapentin 300 MG capsule  Commonly known as: NEURONTIN  Take 1 capsule (300 mg total) by mouth every evening.     ipratropium 21 mcg (0.03 %) nasal spray  Commonly known as: ATROVENT  2 sprays by Each Nostril route 3 (three) times daily.     LIDOcaine 5 %  Commonly known as: LIDODERM  Place 1 patch onto the skin once daily. Remove & Discard patch within 12 hours or as directed by MD     midodrine 5 MG Tab  Commonly known as: PROAMATINE  Take 1 tablet (5 mg total) by mouth 3 (three) times daily as needed (if blood pressure less than 100/60 mmHg).     nitroGLYCERIN 0.4 MG SL tablet  Commonly known as: NITROSTAT  Place 1 tablet (0.4 mg total) under the tongue every 5 (five) minutes as needed for Chest pain.     omeprazole 20 MG capsule  Commonly known as: PRILOSEC  Take 1 capsule (20 mg total) by mouth daily as needed (heartburn).     sevelamer carbonate 800 mg Tab  Commonly known as: RENVELA  This medicine is used to treat your phosphorus level. Please take 1 tablet (800 mg)  once a day with the largest meal of the day.     torsemide 20 MG Tab  Commonly known as: DEMADEX  Take 2 tablets (40 mg total) by mouth 3 (three) times a week.              Indwelling Lines/Drains at time of discharge:   Lines/Drains/Airways       Drain  Duration                  Hemodialysis AV Fistula 02/19/24 0924 Left upper arm 529 days                        Time spent on the discharge of patient: 35 minutes         Juan Miller MD  Department of Hospital Medicine  [facility] - Acute Medical Stepdown

## 2025-08-01 NOTE — ASSESSMENT & PLAN NOTE
Anemia is likely due to chronic disease due to ESRD. Most recent hemoglobin and hematocrit are listed below.  Recent Labs     07/30/25  0521 07/31/25  0836 08/01/25  0508   HGB 9.2* 10.5* 10.1*   HCT 28.9* 33.4* 32.2*     Plan  - Monitor serial CBC: Daily  - Transfuse PRBC if patient becomes hemodynamically unstable, symptomatic or H/H drops below 7/21.  - Patient has not received any PRBC transfusions to date  - Patient's anemia is currently stable

## 2025-08-01 NOTE — ASSESSMENT & PLAN NOTE
Patient has long standing persistent (>12 months) atrial fibrillation. Patient is currently in sinus rhythm. [id number] Score: 2. The patients heart rate in the last 24 hours is as follows:  Pulse  Min: 55  Max: 72     Antiarrhythmics  amiodarone tablet 200 mg, Daily, Oral    Anticoagulants  apixaban tablet 5 mg, 2 times daily, Oral    Plan  - Replete lytes with a goal of K>4, Mg >2  - Patient is anticoagulated, see medications listed above.  - Patient's afib is currently controlled

## 2025-08-01 NOTE — PLAN OF CARE
Problem: Hemodialysis  Goal: Safe, Effective Therapy Delivery  Outcome: Ongoing  Goal: Effective Tissue Perfusion  Outcome: Ongoing  Goal: Absence of Infection Signs and Symptoms  Outcome: Ongoing     Problem: Pneumonia  Goal: Fluid Balance  Outcome: Progressing  Goal: Resolution of Infection Signs and Symptoms  Outcome: Progressing  Goal: Effective Oxygenation and Ventilation  Outcome: Progressing

## 2025-08-01 NOTE — ASSESSMENT & PLAN NOTE
Creatine stable for now. BMP reviewed- noted Estimated Creatinine Clearance: 8.5 mL/min (A) (based on SCr of 9.1 mg/dL (H)). according to latest data. Based on current GFR, CKD stage is end stage.  Monitor UOP and serial BMP and adjust therapy as needed. Renally dose meds. Avoid nephrotoxic medications and procedures.    - Nephrology consulted for HD

## 2025-08-01 NOTE — ASSESSMENT & PLAN NOTE
Patient's blood pressure range in the last 24 hours was: BP  Min: 139/61  Max: 164/74.The patient's inpatient anti-hypertensive regimen is listed below:  Current Antihypertensives  torsemide tablet 40 mg, Three times weekly, Oral    Plan  - BP is controlled, no changes needed to their regimen

## 2025-08-01 NOTE — NURSING
Report received from off going nurse, RUSH Clayton. Patient resting with eyes closed. Rise and fall of chest noted. No signs of distress noted. Wife at bedside for support. Call light in reach. Bed low and locked. Will continue plan of care.

## 2025-08-01 NOTE — PLAN OF CARE
[facility] - Acute Medical Stepdown  Discharge Final Note    Primary Care Provider: Anatoliy Colindres MD    Expected Discharge Date: 8/1/2025    Patient independent at baseline and denies discharge needs.    Final Discharge Note (most recent)       Final Note - 08/01/25 1215          Final Note    Assessment Type Final Discharge Note (P)      Hospital Resources/Appts/Education Provided Provided patient/caregiver with written discharge plan information (P)         Post-Acute Status    Discharge Delays None known at this time (P)                           Contact Info       Jeni Provider   Specialty: Internal Medicine    [address] [facility] [facility]  [address]  [facility] [address]       Next Steps: Follow up

## 2025-08-01 NOTE — PROGRESS NOTES
Patient given discharge instructions and home medication regimen. Patients already have home medication. Patient informed of follow up appointments and given a copy of discharge instructions with follow up appointments listed. Patient's telemetry monitor discontinued. Patient  peripheral IV discontinued. Patient discharged home per MD orders with all personal belongings.

## 2025-08-01 NOTE — PROGRESS NOTES
[facility] ([facility]) Team notified of pt referral via Ambulatory Referral to Heart Failure Transitional Care ([id number]).    Patient screened today by provider and RN for enrollment to program.      Pt was deemed not a candidate for enrollment at this time related to patient is currently on hemo-dialysis.    Pt will require additional follow up planning per primary team.     If pt status, diagnosis, or treatment plan changes , please place AMB referral to [facility] ([id number]) for HFT[facility] enrollment re-evalution.

## 2025-08-01 NOTE — PLAN OF CARE
CHW met with patient/family at bedside. Patient experience rounding completed and reviewed the following.    Do you know your discharge plan? Yes Home   If yes, what is the plan? (Home, Home Health, Rehab, SNF, LTAC, or Other)    Have you dicussed your needs and preferences with your SW/CM? Yes       If you are discharging home do you have help at home? Yes   Patient has help at home.    Do you think you will need additional help at home at discharge?  No  Patient has no need for additional help.    Do you currently have difficulty keeping up with bills, affording medicine or buying food? No  Patient has no difficulty with bills, food, and medicine.      Assigned SW/Cm notified of any patient/family needs or concerns. Appropriate resources provided to address patient needs. Miky CHRISTIANSON, RSW  Case Management

## 2025-08-01 NOTE — HOSPITAL COURSE
Community acquired bacterial pneumonia  SOB (shortness of breath)  Patient presenting with 1 day of dyspnea, fevers, chills, and hypotension concerning for sepsis related to bacterial pneumonia     - Ceftriaxone, azithromycin   - BCx NGTD  - Pulmonary toilet  - Likely MSK vs pleuritic chest pain 7/31, resolved  - CXR with bilateral interstitial and airspace opacities  - Dialysis for volume removal  -  Robaxin ordered      Tremor  Patient with new onset consistent sporadic tremor in the ED that he and wife says is new and has not occurred before.  - Resolved morning after admission  - Outpatient neurology follow-up      Patient deemed appropriate for discharge. I personally saw, examined, and evaluated patient prior to departure. Plan discussed with patient, who was agreeable and amenable; medications were discussed and reviewed, outpatient follow-up scheduled, ER precautions were given, all questions were answered to the patient's satisfaction, and Ibrahima Phelps was subsequently discharged.

## 2025-08-03 LAB
BACTERIA BLD CULT: NORMAL
BACTERIA BLD CULT: NORMAL

## 2025-08-06 ENCOUNTER — TELEPHONE (OUTPATIENT)
Dept: PAIN MEDICINE | Facility: CLINIC | Age: 70
End: 2025-08-06

## 2025-08-06 ENCOUNTER — OFFICE VISIT (OUTPATIENT)
Dept: PAIN MEDICINE | Facility: CLINIC | Age: 70
End: 2025-08-06
Payer: MEDICARE

## 2025-08-06 VITALS
WEIGHT: 209.19 LBS | SYSTOLIC BLOOD PRESSURE: 136 MMHG | DIASTOLIC BLOOD PRESSURE: 70 MMHG | HEART RATE: 61 BPM | HEIGHT: 73 IN | BODY MASS INDEX: 27.73 KG/M2

## 2025-08-06 DIAGNOSIS — M54.16 LUMBAR RADICULOPATHY: ICD-10-CM

## 2025-08-06 DIAGNOSIS — M51.372 DEGENERATION OF INTERVERTEBRAL DISC OF LUMBOSACRAL REGION WITH DISCOGENIC BACK PAIN AND LOWER EXTREMITY PAIN: Primary | ICD-10-CM

## 2025-08-06 DIAGNOSIS — Z94.0 H/O KIDNEY TRANSPLANT: Chronic | ICD-10-CM

## 2025-08-06 DIAGNOSIS — Z99.2 ESRD (END STAGE RENAL DISEASE) ON DIALYSIS: Chronic | ICD-10-CM

## 2025-08-06 DIAGNOSIS — N18.6 ESRD (END STAGE RENAL DISEASE) ON DIALYSIS: Chronic | ICD-10-CM

## 2025-08-06 DIAGNOSIS — M54.41 ACUTE RIGHT-SIDED BACK PAIN WITH SCIATICA: ICD-10-CM

## 2025-08-06 PROCEDURE — 99999 PR PBB SHADOW E&M-EST. PATIENT-LVL IV: CPT | Mod: PBBFAC,,, | Performed by: EMERGENCY MEDICINE

## 2025-08-06 NOTE — PROGRESS NOTES
Interventional Pain Management - New Patient Visit    Chief Complaint:  Lower back pain    Original HPI 08/06/2025: Ibrahima Phelps  presents to the clinic for the evaluation of the above pain. The pain started 7 years ago . Original Pain Description: The pain is located in the lumbar and is radiating to the right knee laterally. The pain is described as aching, sharp, and shooting. Exacerbating factors: Standing. Mitigating factors ice, medications, and physical therapy. Symptoms interfere with no interference. No numbness or tingling in his foot. The patient feels like symptoms have been worsening. Patient denies night fever/night sweats, urinary incontinence, bowel incontinence, significant weight loss, significant motor weakness, and loss of sensations.  Patient Denies perineal paresthesias, bowel/bladder dysfunction.     PAIN SCORES:  Best: Pain is 4  Current: Pain is 5  Worst: Pain is 10    6 weeks of Conservative therapy:  PT: unable due to pain  Chiro:  HEP: Unable due to pain    Treatments / Medications:   Stephan 300mg qHS    Antiplatelets/Anticoagulants/Immunosuppressants:  Eliquis, Plavix    Interventional Pain Procedures:   N/a    IMAGING:    XR LUMBAR SPINE AP AND LAT WITH FLEX/EXT 06/01/2025  Diffuse DDD most severely affecting L3/L4 and L5/S1.  Diffuse facet OA most severely affecting L4/L5 and L5/S1. Mild levo scoliotic curvature of the lumbar spine.    MRI LUMBAR SPINE WITHOUT CONTRAST   01/07/2021  L1-L2: Mild BL facet arthropathy.  L2-L3: Mild BL facet arthropathy   L3-L4: Mild BL facet arthropathy + mild edema left facet. CDB->right extraforaminal protrusion possibly contacts L3NR, adjacent soft tissue edema->mild BL foraminal narrowing.   L4-L5: Mod facet arthropathy w/mild edema adjacent to left facet.  CDB->mild spinal canal stenosis and mod BL foraminal narrowing. Posterior AF.  L5-S1: G1 RL. Mild BL facet arthropathy w/asymmetric posterior CDB->severe left/mild right foraminal narrowing.  "Posterior AF.     Past Surgical History:   Procedure Laterality Date    AV FISTULA PLACEMENT Left 12/18/2023    Procedure: CREATION, AV FISTULA;  Surgeon: JUANI Melendez III, MD;  Location: [facility] OR 2ND FLR;  Service: Vascular;  Laterality: Left;  LUE AVF creation vs AVG insertion    CARDIOVERSION  04/30/15    CHOLECYSTECTOMY      COLONOSCOPY  April 20, 2011    Diverticulosis, repeat recommended in 3 yrs., repeat colonoscopy 2014 revealed 2 polyps.  He should return in 5 years.    COLONOSCOPY N/A 3/13/2020    Procedure: COLONOSCOPY;  Surgeon: Chon Casper MD;  Location: [facility] (4TH FLR);  Service: Endoscopy;  Laterality: N/A;  ok to hold coumadin x5days-see telephone encounter 2/4/20-tb    COLONOSCOPY N/A 2/4/2021    Procedure: COLONOSCOPY;  Surgeon: Chon Casper MD;  Location: [facility] (4TH FLR);  Service: Endoscopy;  Laterality: N/A;  Eliquis - per Dr. GAVIN Blunt ok to hold x 2 days and "restarted asap"- ERW  last prep "poor", [id number] ordered/ Pt requests Dr. Casper  prep ins. mailed - COVID screening on 2/1/21 PCW- ERW    COLONOSCOPY N/A 3/3/2021    Procedure: COLONOSCOPY;  Surgeon: Chon Casper MD;  Location: [facility] (4TH FLR);  Service: Endoscopy;  Laterality: N/A;  Patient with his second poor bowel pre and has poor prep today.  If patient not intersted or can't get colonoscopy tomorrow will need constipation bowel prep and will need to restart his Eliquis today.Thanks,Chon     per Dr Blunt-ok to hold Eliquis 2 days prior      COVID     COLONOSCOPY N/A 12/6/2024    Procedure: COLONOSCOPY;  Surgeon: Chon Casper MD;  Location: [facility] (2ND FLR);  Service: Endoscopy;  Laterality: N/A;  Plavix-Eliquis pending/ HD T-Th-Sat- lab ordered  2/25/24 ECHO PA 49  ref by / [facility] portal-RN  11/29-message to clearance nurse for plavix and eliquis hold-tb  ok to hold Eliquis 2 days per Dr Parmar  ok to hold Plavix 5 days per MEENA Jackson PAAMINATA  12/2- p    CORONARY ANGIOGRAPHY N/A " 2/28/2024    Procedure: ANGIOGRAM, CORONARY ARTERY;  Surgeon: Tylor Lincoln MD;  Location: [facility] CATH LAB;  Service: Cardiology;  Laterality: N/A;    CORONARY ANGIOGRAPHY N/A 7/22/2025    Procedure: ANGIOGRAM, CORONARY ARTERY;  Surgeon: Tylor Lincoln MD;  Location: [facility] CATH LAB;  Service: Cardiology;  Laterality: N/A;    CORONARY ANGIOPLASTY WITH STENT PLACEMENT  9/13/2006    FISTULOGRAM N/A 2/19/2024    Procedure: Fistulogram;  Surgeon: JUANI Melendez III, MD;  Location: [facility] CATH LAB;  Service: Peripheral Vascular;  Laterality: N/A;    FISTULOGRAM, WITH PTA Left 6/3/2024    Procedure: FISTULOGRAM, WITH PTA;  Surgeon: JUANI Melendez III, MD;  Location: [facility] OR 2ND FLR;  Service: Vascular;  Laterality: Left;  mGy: 51.58  GyCm2: 6.8285  Fluoro time: 5.1 min  Contrast: 28 cc    FISTULOGRAM, WITH PTA Left 3/5/2025    Procedure: FISTULOGRAM, WITH PTA;  Surgeon: JUANI Melendez III, MD;  Location: [facility] OR [facility] FLR;  Service: Vascular;  Laterality: Left;    IRRIGATION AND DEBRIDEMENT Right 8/30/2023    Procedure: IRRIGATION AND DEBRIDEMENT, RIGHT MIDDLE FINGER;  Surgeon: Martin Larsen MD;  Location: [facility] OR 2ND FLR;  Service: Orthopedics;  Laterality: Right;    KIDNEY TRANSPLANT  2005    LEFT HEART CATHETERIZATION N/A 2/26/2024    Procedure: Left heart cath;  Surgeon: Tylor Lincoln MD;  Location: [facility] CATH LAB;  Service: Cardiology;  Laterality: N/A;    PARATHYROIDECTOMY      PERCUTANEOUS TRANSLUMINAL ANGIOPLASTY OF ARTERIOVENOUS FISTULA Left 2/19/2024    Procedure: PTA, AV FISTULA;  Surgeon: JUANI Melendez III, MD;  Location: [facility] CATH LAB;  Service: Peripheral Vascular;  Laterality: Left;    STENT, DRUG ELUTING, SINGLE VESSEL, CORONARY N/A 2/28/2024    Procedure: Stent, Drug Eluting, Single Vessel, Coronary;  Surgeon: Tylor Lincoln MD;  Location: [facility] CATH LAB;  Service: Cardiology;  Laterality: N/A;    STENT, PERIPHERAL GRAFT Left 3/5/2025    Procedure: STENT, PERIPHERAL GRAFT;   Surgeon: JUANI Melendez III, MD;  [facility];  Service: Vascular;  Laterality: Left;  mGy 63.17  Gycm2 8.3386  flouro 63.17 min  contrast 38 ml    TREATMENT OF CARDIAC ARRHYTHMIA N/A 3/28/2022    Procedure: CARDIOVERSION;  Surgeon: Migue Blunt MD;  Location: [facility] EP LAB;  Service: Cardiology;  Laterality: N/A;  AF, DCC, MAC, GP, 3 PREP*RODRIGO deferred pt 100% compliant*    VENOPLASTY  6/3/2024    Procedure: ANGIOPLASTY, VEIN;  Surgeon: JUANI Melendez III, MD;  [facility];  Service: Vascular;;  Left subclavian       Social History     Socioeconomic History    Marital status:    Occupational History    Occupation:    Tobacco Use    Smoking status: Former     Current packs/day: 0.00     Average packs/day: 1 pack/day for 54.2 years (54.2 ttl pk-yrs)     Types: Cigarettes     Start date: 1968     Quit date: 2/28/2022     Years since quitting: 3.4    Smokeless tobacco: Never    Tobacco comments:     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     Patient is currently retired   Substance and Sexual Activity    Alcohol use: No    Drug use: No    Sexual activity: Yes     Partners: Female   Social History Narrative    Retired      Social Drivers of Health     Financial Resource Strain: Low Risk  (7/21/2025)    Overall Financial Resource Strain (CARDIA)     Difficulty of Paying Living Expenses: Not very hard   Food Insecurity: No Food Insecurity (7/21/2025)    Hunger Vital Sign     Worried About Running Out of Food in the Last Year: Never true     Ran Out of Food in the Last Year: Never true   Transportation Needs: No Transportation Needs (7/21/2025)    PRAPARE - Transportation     Lack of Transportation (Medical): No     Lack of Transportation (Non-Medical): No   Physical Activity: Inactive (7/21/2025)    Exercise Vital Sign     Days of Exercise per Week: 0 days     Minutes of Exercise per Session: 0 min   Stress: No Stress  Concern Present (7/21/2025)    [employer] [place] [employer] - Occupational Stress Questionnaire     Feeling of Stress : Only a little   Housing Stability: Low Risk  (7/21/2025)    Housing Stability Vital Sign     Unable to Pay for Housing in the Last Year: No     Number of Times Moved in the Last Year: 0     Homeless in the Last Year: No       Medications/Allergies: See med card    ROS:  GENERAL: No fever. No chills. No fatigue. Denies weight loss. Denies weight gain.  Back / musculoskeletal / neuro : See HPI    VITALS: There were no vitals filed for this visit.  There is no height or weight on file to calculate BMI.       No data to display                PHYSICAL EXAM:   GENERAL: Well appearing, in no acute distress, alert and oriented x3.  PSYCH:  Mood and affect appropriate.  SKIN: Skin color, texture, turgor normal, no rashes or lesions.  HEENT:  Normocephalic, atraumatic. Cranial nerves grossly intact.  PULM: No evidence of respiratory difficulty, symmetric chest rise.  GI:  Non-distended  BACK:  Limited range of motion. No pain to palpation over the spinous processes.  Pain with axial loading bilaterally. There is no pain with palpation over the sacroiliac joints bilaterally.   EXTREMITIES: No deformities, edema, or skin discoloration.   MUSCULOSKELETAL: Shoulder, hip, and knee provocative maneuvers are negative. No atrophy is noted.  NEURO: Sensation is equal and appropriate bilaterally. Bilateral upper and lower extremity strength is normal and symmetric. Bilateral upper and lower extremity coordination and muscle stretch reflexes are physiologic and symmetric. Plantar response are downgoing. Straight leg raising in the supine position is negative to radicular pain.   GAIT: normal.      LABS:    Lab Results   Component Value Date    HGBA1C 4.8 06/11/2024       Lab Results   Component Value Date    CREATININE 9.1 (H) 08/01/2025         ASSESSMENT: 69 y.o. year old male with pain, consistent  with:    Encounter Diagnoses   Name Primary?    H/O kidney transplant     ESRD (end stage renal disease) on dialysis     Acute right-sided back pain with sciatica        DISCUSSION: Ibrahima Phelps is a patient with PMHX s/p Renal transplant, failed transplant, CKD4, ESRD T-Th-Sat who comes to us with chronic lower back pain with radiculopathy secondary to degenerative disc disease and foraminal stenosis     PLAN:  - I have stressed the importance of physical activity and a home exercise plan to help with pain and improve health.  - Patient can continue with medications for now since they are providing benefits, using them appropriately, and without side effects.  - Counseled patient regarding the importance of activity modification and constant sleeping habits.  - Interventions: Schedule for a Right  L3/4 and L4/5 Transforaminal epidural steroid injection  to help with their pain and progress with a home exercise plan.. Explained the risks and benefits of the procedure in detail with the patient today in clinic along with alternative treatment options, and the patient elected to pursue the intervention at this time.    - Anticoagulation use: Yes Plavix and Eliquis  - Imaging: Reviewed available imaging with patient and answered any questions they had regarding study.  - The patient's pathophysiology was explained in detail with reference to x-rays, models, other visual aids as appropriate.   - Follow up visit: return to clinic in 3-4 weeks after procedure      I would like to thank Sb oRca PA* for the opportunity to assist in the care of this patient. We had a very nice visit and I look forward to continuing their care. Please let me know if I can be of further assistance.     Manuel Jerry MD  08/06/2025

## 2025-08-06 NOTE — TELEPHONE ENCOUNTER
----- Message from Manuel Vega MD sent at 2025  8:37 AM CDT -----  Regarding: Order for MARIA L SAUCEDO    Patient Name: MARIA L SAUCEDO([id number])  Sex: Male  : 1955      PCP: NESSA GRAY    Center: [facility] CENTRAL BILLING OFFICE     Types of orders made on 2025: Outpatient Referral, Procedure Request    Order Date:2025  Ordering User:MANUEL VEGA [[id number]]  Encounter Provider:Manuel Vega MD [[id number]]  Authorizing Provider: Manuel Vega MD [[id number]]  Department:[facility][[id number]]    Common Order Information  Procedure -> Transforaminal Injection (Specify level and laterality) Cmt: L3/4,             L4/5 right    Order Specific Information  Order: Procedure Request Order for Pain Management [Custom: [id number]]  Order #:          [id number]: 1 FUTURE    Priority: Routine  Class: Clinic Performed    Future Order Information      Expires on:2026            Expected by:2025                   Associated Diagnoses      M51.372 Degeneration of intervertebral disc of lumbosacral region with       discogenic back pain and lower extremity pain      M54.16 Lumbar radiculopathy      Physician -> Rosalino         Facility Name: -> [place]           Priority: Routine  Class: Clinic Performed    Future Order Information      Expires on:2026            Expected by:2025                   Associated Diagnoses      M51.372 Degeneration of intervertebral disc of lumbosacral region with       discogenic back pain and lower extremity pain      M54.16 Lumbar radiculopathy      Procedure -> Transforaminal Injection (Specify level and laterality) Cmt:                 L3/4, L4/5 right        Physician -> Rosalino         Facility Name: -> [place]

## 2025-08-07 ENCOUNTER — TELEPHONE (OUTPATIENT)
Dept: ADMINISTRATIVE | Facility: CLINIC | Age: 70
End: 2025-08-07
Payer: MEDICARE

## 2025-08-08 DIAGNOSIS — J18.9 PNEUMONIA OF RIGHT LUNG DUE TO INFECTIOUS ORGANISM, UNSPECIFIED PART OF LUNG: ICD-10-CM

## 2025-08-12 RX ORDER — IPRATROPIUM BROMIDE AND ALBUTEROL SULFATE 2.5; .5 MG/3ML; MG/3ML
SOLUTION RESPIRATORY (INHALATION)
Qty: 90 ML | Refills: 5 | Status: SHIPPED | OUTPATIENT
Start: 2025-08-12

## 2025-08-13 ENCOUNTER — HOSPITAL ENCOUNTER (OUTPATIENT)
Dept: RADIOLOGY | Facility: HOSPITAL | Age: 70
Discharge: HOME | End: 2025-08-13
Attending: STUDENT IN AN ORGANIZED HEALTH CARE EDUCATION/TRAINING PROGRAM
Payer: MEDICARE

## 2025-08-13 ENCOUNTER — OFFICE VISIT (OUTPATIENT)
Dept: INTERNAL MEDICINE | Facility: CLINIC | Age: 70
End: 2025-08-13
Payer: MEDICARE

## 2025-08-13 VITALS
BODY MASS INDEX: 27.11 KG/M2 | HEIGHT: 73 IN | DIASTOLIC BLOOD PRESSURE: 66 MMHG | SYSTOLIC BLOOD PRESSURE: 120 MMHG | HEART RATE: 89 BPM | OXYGEN SATURATION: 96 % | WEIGHT: 204.56 LBS

## 2025-08-13 DIAGNOSIS — J15.9 COMMUNITY ACQUIRED BACTERIAL PNEUMONIA: ICD-10-CM

## 2025-08-13 DIAGNOSIS — Z09 HOSPITAL DISCHARGE FOLLOW-UP: Primary | ICD-10-CM

## 2025-08-13 DIAGNOSIS — Z12.12 SCREENING FOR COLORECTAL CANCER: ICD-10-CM

## 2025-08-13 DIAGNOSIS — Z12.11 SCREENING FOR COLORECTAL CANCER: ICD-10-CM

## 2025-08-13 DIAGNOSIS — G25.2 POSTURAL TREMOR: ICD-10-CM

## 2025-08-13 PROCEDURE — 71046 X-RAY EXAM CHEST 2 VIEWS: CPT | Mod: TC,HCNC

## 2025-08-13 PROCEDURE — 99999 PR PBB SHADOW E&M-EST. PATIENT-LVL V: CPT | Mod: PBBFAC,HCNC,, | Performed by: STUDENT IN AN ORGANIZED HEALTH CARE EDUCATION/TRAINING PROGRAM

## 2025-08-13 PROCEDURE — 71046 X-RAY EXAM CHEST 2 VIEWS: CPT | Mod: 26,HCNC,, | Performed by: RADIOLOGY

## 2025-08-18 ENCOUNTER — OFFICE VISIT (OUTPATIENT)
Dept: PULMONOLOGY | Facility: CLINIC | Age: 70
End: 2025-08-18
Payer: MEDICARE

## 2025-08-18 ENCOUNTER — PATIENT MESSAGE (OUTPATIENT)
Dept: CARDIOLOGY | Facility: CLINIC | Age: 70
End: 2025-08-18
Payer: MEDICARE

## 2025-08-18 VITALS
BODY MASS INDEX: 27.3 KG/M2 | WEIGHT: 206.88 LBS | HEART RATE: 65 BPM | OXYGEN SATURATION: 99 % | DIASTOLIC BLOOD PRESSURE: 70 MMHG | SYSTOLIC BLOOD PRESSURE: 131 MMHG

## 2025-08-18 DIAGNOSIS — J18.9 PNEUMONIA OF RIGHT LUNG DUE TO INFECTIOUS ORGANISM, UNSPECIFIED PART OF LUNG: Primary | ICD-10-CM

## 2025-08-18 DIAGNOSIS — J44.9 CHRONIC OBSTRUCTIVE PULMONARY DISEASE, UNSPECIFIED COPD TYPE: ICD-10-CM

## 2025-08-18 PROCEDURE — 99214 OFFICE O/P EST MOD 30 MIN: CPT | Mod: HCNC,S$GLB,,

## 2025-08-18 PROCEDURE — 99999 PR PBB SHADOW E&M-EST. PATIENT-LVL IV: CPT | Mod: PBBFAC,HCNC,,

## 2025-08-18 PROCEDURE — 3066F NEPHROPATHY DOC TX: CPT | Mod: CPTII,HCNC,S$GLB,

## 2025-08-18 PROCEDURE — 3075F SYST BP GE 130 - 139MM HG: CPT | Mod: CPTII,HCNC,S$GLB,

## 2025-08-18 PROCEDURE — 1125F AMNT PAIN NOTED PAIN PRSNT: CPT | Mod: CPTII,HCNC,S$GLB,

## 2025-08-18 PROCEDURE — 3078F DIAST BP <80 MM HG: CPT | Mod: CPTII,HCNC,S$GLB,

## 2025-08-18 PROCEDURE — 3008F BODY MASS INDEX DOCD: CPT | Mod: CPTII,HCNC,S$GLB,

## 2025-08-18 PROCEDURE — 1111F DSCHRG MED/CURRENT MED MERGE: CPT | Mod: CPTII,HCNC,S$GLB,

## 2025-08-19 ENCOUNTER — HOSPITAL ENCOUNTER (INPATIENT)
Facility: HOSPITAL | Age: 70
LOS: 3 days | Discharge: HOME | DRG: 177 | End: 2025-08-22
Attending: EMERGENCY MEDICINE | Admitting: STUDENT IN AN ORGANIZED HEALTH CARE EDUCATION/TRAINING PROGRAM
Payer: MEDICARE

## 2025-08-19 ENCOUNTER — DOCUMENTATION ONLY (OUTPATIENT)
Dept: REHABILITATION | Facility: HOSPITAL | Age: 70
End: 2025-08-19
Payer: MEDICARE

## 2025-08-19 ENCOUNTER — PATIENT MESSAGE (OUTPATIENT)
Dept: PULMONOLOGY | Facility: CLINIC | Age: 70
End: 2025-08-19
Payer: MEDICARE

## 2025-08-19 DIAGNOSIS — Z99.2 DIALYSIS PATIENT: ICD-10-CM

## 2025-08-19 DIAGNOSIS — J18.9 PNEUMONIA OF RIGHT LUNG DUE TO INFECTIOUS ORGANISM, UNSPECIFIED PART OF LUNG: Primary | ICD-10-CM

## 2025-08-19 DIAGNOSIS — Z13.6 SCREENING FOR CARDIOVASCULAR CONDITION: ICD-10-CM

## 2025-08-19 LAB
ABSOLUTE EOSINOPHIL (OHS): 0.41 K/UL
ABSOLUTE MONOCYTE (OHS): 0.77 K/UL (ref 0.3–1)
ABSOLUTE NEUTROPHIL COUNT (OHS): 7.3 K/UL (ref 1.8–7.7)
ALBUMIN SERPL BCP-MCNC: 2.8 G/DL (ref 3.5–5.2)
ALLENS TEST: NORMAL
ALP SERPL-CCNC: 91 UNIT/L (ref 40–150)
ALT SERPL W/O P-5'-P-CCNC: 12 UNIT/L (ref 0–55)
ANION GAP (OHS): 14 MMOL/L (ref 8–16)
AST SERPL-CCNC: 24 UNIT/L (ref 0–50)
BASOPHILS # BLD AUTO: 0.02 K/UL
BASOPHILS NFR BLD AUTO: 0.2 %
BILIRUB SERPL-MCNC: 0.4 MG/DL (ref 0.1–1)
BUN SERPL-MCNC: 57 MG/DL (ref 8–23)
CALCIUM SERPL-MCNC: 9.1 MG/DL (ref 8.7–10.5)
CHLORIDE SERPL-SCNC: 105 MMOL/L (ref 95–110)
CO2 SERPL-SCNC: 18 MMOL/L (ref 23–29)
CREAT SERPL-MCNC: 11.7 MG/DL (ref 0.5–1.4)
CRP SERPL-MCNC: 13.6 MG/L
ERYTHROCYTE [DISTWIDTH] IN BLOOD BY AUTOMATED COUNT: 16.8 % (ref 11.5–14.5)
FLUAV AG UPPER RESP QL IA.RAPID: NEGATIVE
FLUBV AG UPPER RESP QL IA.RAPID: NEGATIVE
GFR SERPLBLD CREATININE-BSD FMLA CKD-EPI: 4 ML/MIN/1.73/M2
GLUCOSE SERPL-MCNC: 90 MG/DL (ref 70–110)
HCT VFR BLD AUTO: 34.5 % (ref 40–54)
HGB BLD-MCNC: 10.8 GM/DL (ref 14–18)
IMM GRANULOCYTES # BLD AUTO: 0.05 K/UL (ref 0–0.04)
IMM GRANULOCYTES NFR BLD AUTO: 0.5 % (ref 0–0.5)
LDH SERPL L TO P-CCNC: 1.37 MMOL/L (ref 0.5–2.2)
LYMPHOCYTES # BLD AUTO: 0.89 K/UL (ref 1–4.8)
MAGNESIUM SERPL-MCNC: 2.2 MG/DL (ref 1.6–2.6)
MCH RBC QN AUTO: 27 PG (ref 27–31)
MCHC RBC AUTO-ENTMCNC: 31.3 G/DL (ref 32–36)
MCV RBC AUTO: 86 FL (ref 82–98)
MRSA PCR SCRN (OHS): NOT DETECTED
NUCLEATED RBC (/100WBC) (OHS): 0 /100 WBC
OHS QRS DURATION: 100 MS
OHS QRS DURATION: 98 MS
OHS QTC CALCULATION: 424 MS
OHS QTC CALCULATION: 435 MS
PHOSPHATE SERPL-MCNC: 4.1 MG/DL (ref 2.7–4.5)
PLATELET # BLD AUTO: 221 K/UL (ref 150–450)
PMV BLD AUTO: 11.2 FL (ref 9.2–12.9)
POTASSIUM SERPL-SCNC: 5.1 MMOL/L (ref 3.5–5.1)
PROCALCITONIN SERPL-MCNC: 1.48 NG/ML
PROT SERPL-MCNC: 6.4 GM/DL (ref 6–8.4)
RBC # BLD AUTO: 4 M/UL (ref 4.6–6.2)
RELATIVE EOSINOPHIL (OHS): 4.3 %
RELATIVE LYMPHOCYTE (OHS): 9.4 % (ref 18–48)
RELATIVE MONOCYTE (OHS): 8.2 % (ref 4–15)
RELATIVE NEUTROPHIL (OHS): 77.4 % (ref 38–73)
SAMPLE: NORMAL
SARS-COV-2 RDRP RESP QL NAA+PROBE: NEGATIVE
SITE: NORMAL
SODIUM SERPL-SCNC: 137 MMOL/L (ref 136–145)
WBC # BLD AUTO: 9.44 K/UL (ref 3.9–12.7)

## 2025-08-19 PROCEDURE — 99222 1ST HOSP IP/OBS MODERATE 55: CPT | Mod: HCNC,,, | Performed by: INTERNAL MEDICINE

## 2025-08-19 PROCEDURE — 87502 INFLUENZA DNA AMP PROBE: CPT | Mod: HCNC | Performed by: EMERGENCY MEDICINE

## 2025-08-19 PROCEDURE — 5A1D70Z PERFORMANCE OF URINARY FILTRATION, INTERMITTENT, LESS THAN 6 HOURS PER DAY: ICD-10-PCS | Performed by: EMERGENCY MEDICINE

## 2025-08-19 PROCEDURE — 99285 EMERGENCY DEPT VISIT HI MDM: CPT | Mod: 25,HCNC

## 2025-08-19 PROCEDURE — 63600175 PHARM REV CODE 636 W HCPCS: Mod: HCNC | Performed by: STUDENT IN AN ORGANIZED HEALTH CARE EDUCATION/TRAINING PROGRAM

## 2025-08-19 PROCEDURE — 86140 C-REACTIVE PROTEIN: CPT | Mod: HCNC | Performed by: STUDENT IN AN ORGANIZED HEALTH CARE EDUCATION/TRAINING PROGRAM

## 2025-08-19 PROCEDURE — 93005 ELECTROCARDIOGRAM TRACING: CPT | Mod: HCNC

## 2025-08-19 PROCEDURE — 25000003 PHARM REV CODE 250: Mod: HCNC

## 2025-08-19 PROCEDURE — 87641 MR-STAPH DNA AMP PROBE: CPT | Mod: HCNC | Performed by: STUDENT IN AN ORGANIZED HEALTH CARE EDUCATION/TRAINING PROGRAM

## 2025-08-19 PROCEDURE — 11000001 HC ACUTE MED/SURG PRIVATE ROOM: Mod: HCNC

## 2025-08-19 PROCEDURE — 84100 ASSAY OF PHOSPHORUS: CPT | Mod: HCNC | Performed by: EMERGENCY MEDICINE

## 2025-08-19 PROCEDURE — 80100016 HC MAINTENANCE HEMODIALYSIS: Mod: HCNC

## 2025-08-19 PROCEDURE — 99900035 HC TECH TIME PER 15 MIN (STAT): Mod: HCNC

## 2025-08-19 PROCEDURE — 63600175 PHARM REV CODE 636 W HCPCS: Mod: HCNC

## 2025-08-19 PROCEDURE — 63600175 PHARM REV CODE 636 W HCPCS: Mod: HCNC | Performed by: EMERGENCY MEDICINE

## 2025-08-19 PROCEDURE — 96365 THER/PROPH/DIAG IV INF INIT: CPT | Mod: HCNC

## 2025-08-19 PROCEDURE — 93010 ELECTROCARDIOGRAM REPORT: CPT | Mod: HCNC,,, | Performed by: STUDENT IN AN ORGANIZED HEALTH CARE EDUCATION/TRAINING PROGRAM

## 2025-08-19 PROCEDURE — 82040 ASSAY OF SERUM ALBUMIN: CPT | Mod: HCNC | Performed by: EMERGENCY MEDICINE

## 2025-08-19 PROCEDURE — 83735 ASSAY OF MAGNESIUM: CPT | Mod: HCNC | Performed by: EMERGENCY MEDICINE

## 2025-08-19 PROCEDURE — 85025 COMPLETE CBC W/AUTO DIFF WBC: CPT | Mod: HCNC | Performed by: EMERGENCY MEDICINE

## 2025-08-19 PROCEDURE — 82803 BLOOD GASES ANY COMBINATION: CPT | Mod: HCNC

## 2025-08-19 PROCEDURE — 93010 ELECTROCARDIOGRAM REPORT: CPT | Mod: HCNC,,, | Performed by: INTERNAL MEDICINE

## 2025-08-19 PROCEDURE — 25000003 PHARM REV CODE 250: Mod: HCNC | Performed by: STUDENT IN AN ORGANIZED HEALTH CARE EDUCATION/TRAINING PROGRAM

## 2025-08-19 PROCEDURE — 83605 ASSAY OF LACTIC ACID: CPT | Mod: HCNC

## 2025-08-19 PROCEDURE — U0002 COVID-19 LAB TEST NON-CDC: HCPCS | Mod: HCNC | Performed by: EMERGENCY MEDICINE

## 2025-08-19 PROCEDURE — 84145 PROCALCITONIN (PCT): CPT | Mod: HCNC | Performed by: STUDENT IN AN ORGANIZED HEALTH CARE EDUCATION/TRAINING PROGRAM

## 2025-08-19 PROCEDURE — 25000003 PHARM REV CODE 250: Mod: HCNC | Performed by: EMERGENCY MEDICINE

## 2025-08-19 PROCEDURE — 87040 BLOOD CULTURE FOR BACTERIA: CPT | Mod: HCNC | Performed by: EMERGENCY MEDICINE

## 2025-08-19 PROCEDURE — 87449 NOS EACH ORGANISM AG IA: CPT | Mod: HCNC

## 2025-08-19 RX ORDER — MIDODRINE HYDROCHLORIDE 5 MG/1
5 TABLET ORAL ONCE
Status: COMPLETED | OUTPATIENT
Start: 2025-08-19 | End: 2025-08-19

## 2025-08-19 RX ORDER — CLOPIDOGREL BISULFATE 75 MG/1
75 TABLET ORAL DAILY
Status: CANCELLED | OUTPATIENT
Start: 2025-08-20

## 2025-08-19 RX ORDER — SUCRALFATE 1 G/10ML
1 SUSPENSION ORAL EVERY 6 HOURS
Status: DISCONTINUED | OUTPATIENT
Start: 2025-08-20 | End: 2025-08-22 | Stop reason: HOSPADM

## 2025-08-19 RX ORDER — ATORVASTATIN CALCIUM 40 MG/1
80 TABLET, FILM COATED ORAL DAILY
Status: DISCONTINUED | OUTPATIENT
Start: 2025-08-20 | End: 2025-08-22 | Stop reason: HOSPADM

## 2025-08-19 RX ORDER — MIDODRINE HYDROCHLORIDE 5 MG/1
5 TABLET ORAL 3 TIMES DAILY PRN
Status: DISCONTINUED | OUTPATIENT
Start: 2025-08-19 | End: 2025-08-22 | Stop reason: HOSPADM

## 2025-08-19 RX ORDER — IPRATROPIUM BROMIDE AND ALBUTEROL SULFATE 2.5; .5 MG/3ML; MG/3ML
3 SOLUTION RESPIRATORY (INHALATION) EVERY 6 HOURS PRN
Status: DISCONTINUED | OUTPATIENT
Start: 2025-08-19 | End: 2025-08-22 | Stop reason: HOSPADM

## 2025-08-19 RX ORDER — CLOPIDOGREL BISULFATE 75 MG/1
75 TABLET ORAL DAILY
Status: DISCONTINUED | OUTPATIENT
Start: 2025-08-20 | End: 2025-08-22 | Stop reason: HOSPADM

## 2025-08-19 RX ORDER — TORSEMIDE 20 MG/1
40 TABLET ORAL
Status: DISCONTINUED | OUTPATIENT
Start: 2025-08-20 | End: 2025-08-22 | Stop reason: HOSPADM

## 2025-08-19 RX ORDER — PANTOPRAZOLE SODIUM 40 MG/1
40 TABLET, DELAYED RELEASE ORAL DAILY
Status: DISCONTINUED | OUTPATIENT
Start: 2025-08-20 | End: 2025-08-20

## 2025-08-19 RX ORDER — ATORVASTATIN CALCIUM 40 MG/1
80 TABLET, FILM COATED ORAL DAILY
Status: CANCELLED | OUTPATIENT
Start: 2025-08-20

## 2025-08-19 RX ORDER — CEFEPIME HYDROCHLORIDE 1 G/1
1 INJECTION, POWDER, FOR SOLUTION INTRAMUSCULAR; INTRAVENOUS
Status: DISCONTINUED | OUTPATIENT
Start: 2025-08-19 | End: 2025-08-21

## 2025-08-19 RX ORDER — FAMOTIDINE 20 MG/1
20 TABLET, FILM COATED ORAL EVERY OTHER DAY
Status: DISCONTINUED | OUTPATIENT
Start: 2025-08-20 | End: 2025-08-22 | Stop reason: HOSPADM

## 2025-08-19 RX ORDER — ALUMINUM HYDROXIDE, MAGNESIUM HYDROXIDE, AND SIMETHICONE 1200; 120; 1200 MG/30ML; MG/30ML; MG/30ML
30 SUSPENSION ORAL
Status: DISCONTINUED | OUTPATIENT
Start: 2025-08-19 | End: 2025-08-19

## 2025-08-19 RX ORDER — FLUTICASONE FUROATE AND VILANTEROL 100; 25 UG/1; UG/1
1 POWDER RESPIRATORY (INHALATION) DAILY
Status: DISCONTINUED | OUTPATIENT
Start: 2025-08-20 | End: 2025-08-20

## 2025-08-19 RX ORDER — AMIODARONE HYDROCHLORIDE 200 MG/1
200 TABLET ORAL DAILY
Status: DISCONTINUED | OUTPATIENT
Start: 2025-08-20 | End: 2025-08-22 | Stop reason: HOSPADM

## 2025-08-19 RX ORDER — SODIUM CHLORIDE 9 MG/ML
INJECTION, SOLUTION INTRAVENOUS
OUTPATIENT
Start: 2025-08-19

## 2025-08-19 RX ORDER — SEVELAMER CARBONATE 800 MG/1
800 TABLET, FILM COATED ORAL
Status: DISCONTINUED | OUTPATIENT
Start: 2025-08-20 | End: 2025-08-22 | Stop reason: HOSPADM

## 2025-08-19 RX ORDER — BISACODYL 10 MG/1
10 SUPPOSITORY RECTAL DAILY PRN
Status: CANCELLED | OUTPATIENT
Start: 2025-08-19

## 2025-08-19 RX ORDER — AMIODARONE HYDROCHLORIDE 200 MG/1
200 TABLET ORAL DAILY
Status: CANCELLED | OUTPATIENT
Start: 2025-08-20

## 2025-08-19 RX ORDER — ONDANSETRON HYDROCHLORIDE 2 MG/ML
4 INJECTION, SOLUTION INTRAVENOUS EVERY 6 HOURS PRN
Status: DISCONTINUED | OUTPATIENT
Start: 2025-08-19 | End: 2025-08-22 | Stop reason: HOSPADM

## 2025-08-19 RX ORDER — MUPIROCIN 20 MG/G
OINTMENT TOPICAL 2 TIMES DAILY
OUTPATIENT
Start: 2025-08-19 | End: 2025-08-24

## 2025-08-19 RX ORDER — IPRATROPIUM BROMIDE AND ALBUTEROL SULFATE 2.5; .5 MG/3ML; MG/3ML
3 SOLUTION RESPIRATORY (INHALATION) EVERY 4 HOURS PRN
Status: CANCELLED | OUTPATIENT
Start: 2025-08-19

## 2025-08-19 RX ORDER — SODIUM CHLORIDE 9 MG/ML
INJECTION, SOLUTION INTRAVENOUS ONCE
OUTPATIENT
Start: 2025-08-19 | End: 2025-08-19

## 2025-08-19 RX ADMIN — ALUMINUM HYDROXIDE, MAGNESIUM HYDROXIDE, AND SIMETHICONE 30 ML: 200; 200; 20 SUSPENSION ORAL at 09:08

## 2025-08-19 RX ADMIN — PIPERACILLIN AND TAZOBACTAM 4.5 G: 4; .5 INJECTION, POWDER, LYOPHILIZED, FOR SOLUTION INTRAVENOUS; PARENTERAL at 01:08

## 2025-08-19 RX ADMIN — APIXABAN 5 MG: 5 TABLET, FILM COATED ORAL at 09:08

## 2025-08-19 RX ADMIN — MIDODRINE HYDROCHLORIDE 5 MG: 5 TABLET ORAL at 03:08

## 2025-08-19 RX ADMIN — VANCOMYCIN HYDROCHLORIDE 1750 MG: 100 INJECTION, POWDER, LYOPHILIZED, FOR SOLUTION INTRAVENOUS at 09:08

## 2025-08-19 RX ADMIN — CEFEPIME 1 G: 1 INJECTION, POWDER, FOR SOLUTION INTRAMUSCULAR; INTRAVENOUS at 09:08

## 2025-08-20 LAB
ABSOLUTE EOSINOPHIL (OHS): 0.46 K/UL
ABSOLUTE MONOCYTE (OHS): 1.13 K/UL (ref 0.3–1)
ABSOLUTE NEUTROPHIL COUNT (OHS): 6.93 K/UL (ref 1.8–7.7)
ALBUMIN SERPL BCP-MCNC: 2.8 G/DL (ref 3.5–5.2)
ALP SERPL-CCNC: 73 UNIT/L (ref 40–150)
ALT SERPL W/O P-5'-P-CCNC: 14 UNIT/L (ref 0–55)
ANION GAP (OHS): 12 MMOL/L (ref 8–16)
AST SERPL-CCNC: 27 UNIT/L (ref 0–50)
BASOPHILS # BLD AUTO: 0.03 K/UL
BASOPHILS NFR BLD AUTO: 0.3 %
BILIRUB SERPL-MCNC: 0.5 MG/DL (ref 0.1–1)
BUN SERPL-MCNC: 37 MG/DL (ref 8–23)
CALCIUM SERPL-MCNC: 9.4 MG/DL (ref 8.7–10.5)
CHLORIDE SERPL-SCNC: 104 MMOL/L (ref 95–110)
CO2 SERPL-SCNC: 21 MMOL/L (ref 23–29)
CREAT SERPL-MCNC: 9.1 MG/DL (ref 0.5–1.4)
ERYTHROCYTE [DISTWIDTH] IN BLOOD BY AUTOMATED COUNT: 16.9 % (ref 11.5–14.5)
GFR SERPLBLD CREATININE-BSD FMLA CKD-EPI: 6 ML/MIN/1.73/M2
GLUCOSE SERPL-MCNC: 82 MG/DL (ref 70–110)
HCT VFR BLD AUTO: 33.3 % (ref 40–54)
HGB BLD-MCNC: 10.5 GM/DL (ref 14–18)
IMM GRANULOCYTES # BLD AUTO: 0.05 K/UL (ref 0–0.04)
IMM GRANULOCYTES NFR BLD AUTO: 0.5 % (ref 0–0.5)
LYMPHOCYTES # BLD AUTO: 1.59 K/UL (ref 1–4.8)
MAGNESIUM SERPL-MCNC: 2.3 MG/DL (ref 1.6–2.6)
MCH RBC QN AUTO: 26.7 PG (ref 27–31)
MCHC RBC AUTO-ENTMCNC: 31.5 G/DL (ref 32–36)
MCV RBC AUTO: 85 FL (ref 82–98)
NUCLEATED RBC (/100WBC) (OHS): 0 /100 WBC
PHOSPHATE SERPL-MCNC: 3.7 MG/DL (ref 2.7–4.5)
PLATELET # BLD AUTO: 207 K/UL (ref 150–450)
PMV BLD AUTO: 11.3 FL (ref 9.2–12.9)
POTASSIUM SERPL-SCNC: 5 MMOL/L (ref 3.5–5.1)
PROT SERPL-MCNC: 6.5 GM/DL (ref 6–8.4)
RBC # BLD AUTO: 3.93 M/UL (ref 4.6–6.2)
RELATIVE EOSINOPHIL (OHS): 4.5 %
RELATIVE LYMPHOCYTE (OHS): 15.6 % (ref 18–48)
RELATIVE MONOCYTE (OHS): 11.1 % (ref 4–15)
RELATIVE NEUTROPHIL (OHS): 68 % (ref 38–73)
SODIUM SERPL-SCNC: 137 MMOL/L (ref 136–145)
WBC # BLD AUTO: 10.19 K/UL (ref 3.9–12.7)

## 2025-08-20 PROCEDURE — 99223 1ST HOSP IP/OBS HIGH 75: CPT | Mod: HCNC,GC,, | Performed by: INTERNAL MEDICINE

## 2025-08-20 PROCEDURE — 94761 N-INVAS EAR/PLS OXIMETRY MLT: CPT | Mod: HCNC

## 2025-08-20 PROCEDURE — 87205 SMEAR GRAM STAIN: CPT | Mod: HCNC | Performed by: STUDENT IN AN ORGANIZED HEALTH CARE EDUCATION/TRAINING PROGRAM

## 2025-08-20 PROCEDURE — 25000003 PHARM REV CODE 250: Mod: HCNC | Performed by: NURSE PRACTITIONER

## 2025-08-20 PROCEDURE — 25000003 PHARM REV CODE 250: Mod: HCNC | Performed by: STUDENT IN AN ORGANIZED HEALTH CARE EDUCATION/TRAINING PROGRAM

## 2025-08-20 PROCEDURE — 25000242 PHARM REV CODE 250 ALT 637 W/ HCPCS: Mod: HCNC

## 2025-08-20 PROCEDURE — 86317 IMMUNOASSAY INFECTIOUS AGENT: CPT | Mod: HCNC

## 2025-08-20 PROCEDURE — 94640 AIRWAY INHALATION TREATMENT: CPT | Mod: HCNC

## 2025-08-20 PROCEDURE — 11000001 HC ACUTE MED/SURG PRIVATE ROOM: Mod: HCNC

## 2025-08-20 PROCEDURE — 25000003 PHARM REV CODE 250: Mod: HCNC

## 2025-08-20 PROCEDURE — 63600175 PHARM REV CODE 636 W HCPCS: Mod: HCNC

## 2025-08-20 PROCEDURE — 36415 COLL VENOUS BLD VENIPUNCTURE: CPT | Mod: HCNC

## 2025-08-20 PROCEDURE — 80053 COMPREHEN METABOLIC PANEL: CPT | Mod: HCNC | Performed by: STUDENT IN AN ORGANIZED HEALTH CARE EDUCATION/TRAINING PROGRAM

## 2025-08-20 PROCEDURE — 36415 COLL VENOUS BLD VENIPUNCTURE: CPT | Mod: HCNC | Performed by: STUDENT IN AN ORGANIZED HEALTH CARE EDUCATION/TRAINING PROGRAM

## 2025-08-20 PROCEDURE — 21400001 HC TELEMETRY ROOM: Mod: HCNC

## 2025-08-20 PROCEDURE — 84100 ASSAY OF PHOSPHORUS: CPT | Mod: HCNC | Performed by: STUDENT IN AN ORGANIZED HEALTH CARE EDUCATION/TRAINING PROGRAM

## 2025-08-20 PROCEDURE — 86635 COCCIDIOIDES ANTIBODY: CPT | Mod: HCNC | Performed by: STUDENT IN AN ORGANIZED HEALTH CARE EDUCATION/TRAINING PROGRAM

## 2025-08-20 PROCEDURE — 83735 ASSAY OF MAGNESIUM: CPT | Mod: HCNC | Performed by: STUDENT IN AN ORGANIZED HEALTH CARE EDUCATION/TRAINING PROGRAM

## 2025-08-20 PROCEDURE — 85025 COMPLETE CBC W/AUTO DIFF WBC: CPT | Mod: HCNC | Performed by: STUDENT IN AN ORGANIZED HEALTH CARE EDUCATION/TRAINING PROGRAM

## 2025-08-20 PROCEDURE — 25000242 PHARM REV CODE 250 ALT 637 W/ HCPCS: Mod: HCNC | Performed by: STUDENT IN AN ORGANIZED HEALTH CARE EDUCATION/TRAINING PROGRAM

## 2025-08-20 RX ORDER — IPRATROPIUM BROMIDE AND ALBUTEROL SULFATE 2.5; .5 MG/3ML; MG/3ML
3 SOLUTION RESPIRATORY (INHALATION)
Status: DISCONTINUED | OUTPATIENT
Start: 2025-08-20 | End: 2025-08-22 | Stop reason: HOSPADM

## 2025-08-20 RX ORDER — GUAIFENESIN 600 MG/1
600 TABLET, EXTENDED RELEASE ORAL 2 TIMES DAILY
Status: DISCONTINUED | OUTPATIENT
Start: 2025-08-20 | End: 2025-08-21

## 2025-08-20 RX ORDER — MUPIROCIN 20 MG/G
OINTMENT TOPICAL 2 TIMES DAILY
Status: DISCONTINUED | OUTPATIENT
Start: 2025-08-20 | End: 2025-08-22 | Stop reason: HOSPADM

## 2025-08-20 RX ORDER — IPRATROPIUM BROMIDE AND ALBUTEROL SULFATE 2.5; .5 MG/3ML; MG/3ML
3 SOLUTION RESPIRATORY (INHALATION) EVERY 8 HOURS
Status: DISCONTINUED | OUTPATIENT
Start: 2025-08-20 | End: 2025-08-20

## 2025-08-20 RX ORDER — SODIUM CHLORIDE FOR INHALATION 3 %
4 VIAL, NEBULIZER (ML) INHALATION ONCE
Status: DISCONTINUED | OUTPATIENT
Start: 2025-08-20 | End: 2025-08-20

## 2025-08-20 RX ORDER — SODIUM CHLORIDE FOR INHALATION 3 %
4 VIAL, NEBULIZER (ML) INHALATION ONCE
Status: COMPLETED | OUTPATIENT
Start: 2025-08-20 | End: 2025-08-20

## 2025-08-20 RX ADMIN — IPRATROPIUM BROMIDE AND ALBUTEROL SULFATE 3 ML: 2.5; .5 SOLUTION RESPIRATORY (INHALATION) at 02:08

## 2025-08-20 RX ADMIN — GUAIFENESIN 600 MG: 600 TABLET, EXTENDED RELEASE ORAL at 08:08

## 2025-08-20 RX ADMIN — AMIODARONE HYDROCHLORIDE 200 MG: 200 TABLET ORAL at 08:08

## 2025-08-20 RX ADMIN — SEVELAMER CARBONATE 800 MG: 800 TABLET, FILM COATED ORAL at 04:08

## 2025-08-20 RX ADMIN — MUPIROCIN: 20 OINTMENT TOPICAL at 08:08

## 2025-08-20 RX ADMIN — SUCRALFATE 1 G: 1 SUSPENSION ORAL at 05:08

## 2025-08-20 RX ADMIN — ATORVASTATIN CALCIUM 80 MG: 40 TABLET, FILM COATED ORAL at 08:08

## 2025-08-20 RX ADMIN — PANTOPRAZOLE SODIUM 40 MG: 40 TABLET, DELAYED RELEASE ORAL at 08:08

## 2025-08-20 RX ADMIN — SUCRALFATE 1 G: 1 SUSPENSION ORAL at 06:08

## 2025-08-20 RX ADMIN — SEVELAMER CARBONATE 800 MG: 800 TABLET, FILM COATED ORAL at 07:08

## 2025-08-20 RX ADMIN — APIXABAN 5 MG: 5 TABLET, FILM COATED ORAL at 08:08

## 2025-08-20 RX ADMIN — SEVELAMER CARBONATE 800 MG: 800 TABLET, FILM COATED ORAL at 11:08

## 2025-08-20 RX ADMIN — CEFEPIME 1 G: 1 INJECTION, POWDER, FOR SOLUTION INTRAMUSCULAR; INTRAVENOUS at 08:08

## 2025-08-20 RX ADMIN — IPRATROPIUM BROMIDE AND ALBUTEROL SULFATE 3 ML: 2.5; .5 SOLUTION RESPIRATORY (INHALATION) at 08:08

## 2025-08-20 RX ADMIN — IPRATROPIUM BROMIDE AND ALBUTEROL SULFATE 3 ML: 2.5; .5 SOLUTION RESPIRATORY (INHALATION) at 09:08

## 2025-08-20 RX ADMIN — GUAIFENESIN 600 MG: 600 TABLET, EXTENDED RELEASE ORAL at 09:08

## 2025-08-20 RX ADMIN — APIXABAN 5 MG: 5 TABLET, FILM COATED ORAL at 11:08

## 2025-08-20 RX ADMIN — FAMOTIDINE 20 MG: 20 TABLET, FILM COATED ORAL at 08:08

## 2025-08-20 RX ADMIN — SUCRALFATE 1 G: 1 SUSPENSION ORAL at 12:08

## 2025-08-20 RX ADMIN — SUCRALFATE 1 G: 1 SUSPENSION ORAL at 11:08

## 2025-08-20 RX ADMIN — FLUTICASONE FUROATE AND VILANTEROL TRIFENATATE 1 PUFF: 100; 25 POWDER RESPIRATORY (INHALATION) at 09:08

## 2025-08-20 RX ADMIN — SODIUM CHLORIDE SOLN NEBU 3% 4 ML: 3 NEBU SOLN at 06:08

## 2025-08-20 RX ADMIN — MUPIROCIN: 20 OINTMENT TOPICAL at 11:08

## 2025-08-20 RX ADMIN — CLOPIDOGREL 75 MG: 75 TABLET ORAL at 08:08

## 2025-08-21 LAB
ABSOLUTE EOSINOPHIL (OHS): 0.63 K/UL
ABSOLUTE MONOCYTE (OHS): 1.15 K/UL (ref 0.3–1)
ABSOLUTE NEUTROPHIL COUNT (OHS): 4.59 K/UL (ref 1.8–7.7)
ALBUMIN SERPL BCP-MCNC: 2.6 G/DL (ref 3.5–5.2)
ALP SERPL-CCNC: 68 UNIT/L (ref 40–150)
ALT SERPL W/O P-5'-P-CCNC: 9 UNIT/L (ref 0–55)
ANION GAP (OHS): 14 MMOL/L (ref 8–16)
AST SERPL-CCNC: 24 UNIT/L (ref 0–50)
BASOPHILS # BLD AUTO: 0.03 K/UL
BASOPHILS NFR BLD AUTO: 0.4 %
BILIRUB SERPL-MCNC: 0.4 MG/DL (ref 0.1–1)
BUN SERPL-MCNC: 21 MG/DL (ref 8–23)
BUN SERPL-MCNC: 53 MG/DL (ref 8–23)
BUN SERPL-MCNC: 58 MG/DL (ref 8–23)
CALCIUM SERPL-MCNC: 9.6 MG/DL (ref 8.7–10.5)
CHLORIDE SERPL-SCNC: 101 MMOL/L (ref 95–110)
CO2 SERPL-SCNC: 20 MMOL/L (ref 23–29)
CREAT SERPL-MCNC: 11.8 MG/DL (ref 0.5–1.4)
ERYTHROCYTE [DISTWIDTH] IN BLOOD BY AUTOMATED COUNT: 17.1 % (ref 11.5–14.5)
GFR SERPLBLD CREATININE-BSD FMLA CKD-EPI: 4 ML/MIN/1.73/M2
GLUCOSE SERPL-MCNC: 73 MG/DL (ref 70–110)
HCT VFR BLD AUTO: 32.9 % (ref 40–54)
HGB BLD-MCNC: 10.4 GM/DL (ref 14–18)
IMM GRANULOCYTES # BLD AUTO: 0.05 K/UL (ref 0–0.04)
IMM GRANULOCYTES NFR BLD AUTO: 0.6 % (ref 0–0.5)
LYMPHOCYTES # BLD AUTO: 1.59 K/UL (ref 1–4.8)
M HISTOPLASMA/BLASTOMYCES AG RESULT: NOT DETECTED
M HISTOPLASMA/BLASTOMYCES AG VALUE: NOT DETECTED NG/ML
MAGNESIUM SERPL-MCNC: 2.4 MG/DL (ref 1.6–2.6)
MCH RBC QN AUTO: 27.6 PG (ref 27–31)
MCHC RBC AUTO-ENTMCNC: 31.6 G/DL (ref 32–36)
MCV RBC AUTO: 87 FL (ref 82–98)
NUCLEATED RBC (/100WBC) (OHS): 0 /100 WBC
PHOSPHATE SERPL-MCNC: 4.6 MG/DL (ref 2.7–4.5)
PLATELET # BLD AUTO: 150 K/UL (ref 150–450)
PMV BLD AUTO: 11.9 FL (ref 9.2–12.9)
POTASSIUM SERPL-SCNC: 5.5 MMOL/L (ref 3.5–5.1)
PROT SERPL-MCNC: 6.4 GM/DL (ref 6–8.4)
RBC # BLD AUTO: 3.77 M/UL (ref 4.6–6.2)
RELATIVE EOSINOPHIL (OHS): 7.8 %
RELATIVE LYMPHOCYTE (OHS): 19.8 % (ref 18–48)
RELATIVE MONOCYTE (OHS): 14.3 % (ref 4–15)
RELATIVE NEUTROPHIL (OHS): 57.1 % (ref 38–73)
SODIUM SERPL-SCNC: 135 MMOL/L (ref 136–145)
WBC # BLD AUTO: 8.04 K/UL (ref 3.9–12.7)

## 2025-08-21 PROCEDURE — 92611 MOTION FLUOROSCOPY/SWALLOW: CPT | Mod: HCNC

## 2025-08-21 PROCEDURE — 25000003 PHARM REV CODE 250: Mod: HCNC

## 2025-08-21 PROCEDURE — 25000003 PHARM REV CODE 250: Mod: HCNC | Performed by: NURSE PRACTITIONER

## 2025-08-21 PROCEDURE — 85025 COMPLETE CBC W/AUTO DIFF WBC: CPT | Mod: HCNC | Performed by: STUDENT IN AN ORGANIZED HEALTH CARE EDUCATION/TRAINING PROGRAM

## 2025-08-21 PROCEDURE — 25000003 PHARM REV CODE 250: Mod: HCNC | Performed by: STUDENT IN AN ORGANIZED HEALTH CARE EDUCATION/TRAINING PROGRAM

## 2025-08-21 PROCEDURE — 84520 ASSAY OF UREA NITROGEN: CPT | Mod: HCNC

## 2025-08-21 PROCEDURE — 94640 AIRWAY INHALATION TREATMENT: CPT | Mod: HCNC

## 2025-08-21 PROCEDURE — 99900035 HC TECH TIME PER 15 MIN (STAT): Mod: HCNC

## 2025-08-21 PROCEDURE — 21400001 HC TELEMETRY ROOM: Mod: HCNC

## 2025-08-21 PROCEDURE — A9698 NON-RAD CONTRAST MATERIALNOC: HCPCS | Mod: HCNC | Performed by: HOSPITALIST

## 2025-08-21 PROCEDURE — 63600175 PHARM REV CODE 636 W HCPCS: Mod: HCNC

## 2025-08-21 PROCEDURE — 25000242 PHARM REV CODE 250 ALT 637 W/ HCPCS: Mod: HCNC | Performed by: STUDENT IN AN ORGANIZED HEALTH CARE EDUCATION/TRAINING PROGRAM

## 2025-08-21 PROCEDURE — 25500020 PHARM REV CODE 255: Mod: HCNC | Performed by: HOSPITALIST

## 2025-08-21 PROCEDURE — 83735 ASSAY OF MAGNESIUM: CPT | Mod: HCNC | Performed by: STUDENT IN AN ORGANIZED HEALTH CARE EDUCATION/TRAINING PROGRAM

## 2025-08-21 PROCEDURE — 94761 N-INVAS EAR/PLS OXIMETRY MLT: CPT | Mod: HCNC

## 2025-08-21 PROCEDURE — 84460 ALANINE AMINO (ALT) (SGPT): CPT | Mod: HCNC | Performed by: STUDENT IN AN ORGANIZED HEALTH CARE EDUCATION/TRAINING PROGRAM

## 2025-08-21 PROCEDURE — 84100 ASSAY OF PHOSPHORUS: CPT | Mod: HCNC | Performed by: STUDENT IN AN ORGANIZED HEALTH CARE EDUCATION/TRAINING PROGRAM

## 2025-08-21 PROCEDURE — 97535 SELF CARE MNGMENT TRAINING: CPT | Mod: HCNC

## 2025-08-21 PROCEDURE — 99233 SBSQ HOSP IP/OBS HIGH 50: CPT | Mod: HCNC,GC,, | Performed by: INTERNAL MEDICINE

## 2025-08-21 PROCEDURE — 11000001 HC ACUTE MED/SURG PRIVATE ROOM: Mod: HCNC

## 2025-08-21 PROCEDURE — 90935 HEMODIALYSIS ONE EVALUATION: CPT | Mod: HCNC,,, | Performed by: INTERNAL MEDICINE

## 2025-08-21 PROCEDURE — 80100016 HC MAINTENANCE HEMODIALYSIS: Mod: HCNC

## 2025-08-21 PROCEDURE — 36415 COLL VENOUS BLD VENIPUNCTURE: CPT | Mod: HCNC | Performed by: STUDENT IN AN ORGANIZED HEALTH CARE EDUCATION/TRAINING PROGRAM

## 2025-08-21 RX ORDER — ACETAMINOPHEN 325 MG/1
650 TABLET ORAL EVERY 6 HOURS PRN
Status: DISCONTINUED | OUTPATIENT
Start: 2025-08-21 | End: 2025-08-22 | Stop reason: HOSPADM

## 2025-08-21 RX ORDER — SODIUM CHLORIDE 9 MG/ML
INJECTION, SOLUTION INTRAVENOUS
OUTPATIENT
Start: 2025-08-21

## 2025-08-21 RX ORDER — SODIUM CHLORIDE 9 MG/ML
INJECTION, SOLUTION INTRAVENOUS ONCE
OUTPATIENT
Start: 2025-08-21 | End: 2025-08-21

## 2025-08-21 RX ORDER — CEFTRIAXONE 2 G/1
2 INJECTION, POWDER, FOR SOLUTION INTRAMUSCULAR; INTRAVENOUS
Status: DISCONTINUED | OUTPATIENT
Start: 2025-08-21 | End: 2025-08-22 | Stop reason: HOSPADM

## 2025-08-21 RX ADMIN — MUPIROCIN: 20 OINTMENT TOPICAL at 08:08

## 2025-08-21 RX ADMIN — ATORVASTATIN CALCIUM 80 MG: 40 TABLET, FILM COATED ORAL at 08:08

## 2025-08-21 RX ADMIN — SEVELAMER CARBONATE 800 MG: 800 TABLET, FILM COATED ORAL at 04:08

## 2025-08-21 RX ADMIN — MIDODRINE HYDROCHLORIDE 5 MG: 5 TABLET ORAL at 11:08

## 2025-08-21 RX ADMIN — AMIODARONE HYDROCHLORIDE 200 MG: 200 TABLET ORAL at 08:08

## 2025-08-21 RX ADMIN — IPRATROPIUM BROMIDE AND ALBUTEROL SULFATE 3 ML: 2.5; .5 SOLUTION RESPIRATORY (INHALATION) at 08:08

## 2025-08-21 RX ADMIN — TIOTROPIUM BROMIDE INHALATION SPRAY 2 PUFF: 3.12 SPRAY, METERED RESPIRATORY (INHALATION) at 02:08

## 2025-08-21 RX ADMIN — SUCRALFATE 1 G: 1 SUSPENSION ORAL at 12:08

## 2025-08-21 RX ADMIN — SUCRALFATE 1 G: 1 SUSPENSION ORAL at 01:08

## 2025-08-21 RX ADMIN — IPRATROPIUM BROMIDE AND ALBUTEROL SULFATE 3 ML: 2.5; .5 SOLUTION RESPIRATORY (INHALATION) at 07:08

## 2025-08-21 RX ADMIN — SEVELAMER CARBONATE 800 MG: 800 TABLET, FILM COATED ORAL at 08:08

## 2025-08-21 RX ADMIN — SEVELAMER CARBONATE 800 MG: 800 TABLET, FILM COATED ORAL at 01:08

## 2025-08-21 RX ADMIN — BARIUM SULFATE 60 ML: 0.81 POWDER, FOR SUSPENSION ORAL at 09:08

## 2025-08-21 RX ADMIN — APIXABAN 5 MG: 5 TABLET, FILM COATED ORAL at 08:08

## 2025-08-21 RX ADMIN — SUCRALFATE 1 G: 1 SUSPENSION ORAL at 05:08

## 2025-08-21 RX ADMIN — GUAIFENESIN 600 MG: 600 TABLET, EXTENDED RELEASE ORAL at 08:08

## 2025-08-21 RX ADMIN — CLOPIDOGREL 75 MG: 75 TABLET ORAL at 08:08

## 2025-08-21 RX ADMIN — IPRATROPIUM BROMIDE AND ALBUTEROL SULFATE 3 ML: 2.5; .5 SOLUTION RESPIRATORY (INHALATION) at 02:08

## 2025-08-21 RX ADMIN — CEFTRIAXONE SODIUM 2 G: 2 INJECTION, POWDER, FOR SOLUTION INTRAMUSCULAR; INTRAVENOUS at 01:08

## 2025-08-21 RX ADMIN — ACETAMINOPHEN 650 MG: 325 TABLET ORAL at 09:08

## 2025-08-22 VITALS
DIASTOLIC BLOOD PRESSURE: 63 MMHG | RESPIRATION RATE: 18 BRPM | HEIGHT: 73 IN | BODY MASS INDEX: 27.3 KG/M2 | HEART RATE: 66 BPM | WEIGHT: 206 LBS | TEMPERATURE: 98 F | OXYGEN SATURATION: 96 % | SYSTOLIC BLOOD PRESSURE: 139 MMHG

## 2025-08-22 LAB
ABSOLUTE EOSINOPHIL (OHS): 0.78 K/UL
ABSOLUTE MONOCYTE (OHS): 1.06 K/UL (ref 0.3–1)
ABSOLUTE NEUTROPHIL COUNT (OHS): 3.17 K/UL (ref 1.8–7.7)
ALBUMIN SERPL BCP-MCNC: 2.7 G/DL (ref 3.5–5.2)
ALP SERPL-CCNC: 62 UNIT/L (ref 40–150)
ALT SERPL W/O P-5'-P-CCNC: 10 UNIT/L (ref 0–55)
ANION GAP (OHS): 13 MMOL/L (ref 8–16)
AST SERPL-CCNC: 24 UNIT/L (ref 0–50)
BASOPHILS # BLD AUTO: 0.02 K/UL
BASOPHILS NFR BLD AUTO: 0.3 %
BILIRUB SERPL-MCNC: 0.4 MG/DL (ref 0.1–1)
BUN SERPL-MCNC: 36 MG/DL (ref 8–23)
CALCIUM SERPL-MCNC: 8.9 MG/DL (ref 8.7–10.5)
CHLORIDE SERPL-SCNC: 97 MMOL/L (ref 95–110)
CO2 SERPL-SCNC: 25 MMOL/L (ref 23–29)
COCCIDIOIDES IGG SER QL IA: 0.7 IV
COCCIDIOIDES IGM SER QL IA: 0.1 IV
CREAT SERPL-MCNC: 8.7 MG/DL (ref 0.5–1.4)
ERYTHROCYTE [DISTWIDTH] IN BLOOD BY AUTOMATED COUNT: 16.6 % (ref 11.5–14.5)
GFR SERPLBLD CREATININE-BSD FMLA CKD-EPI: 6 ML/MIN/1.73/M2
GLUCOSE SERPL-MCNC: 72 MG/DL (ref 70–110)
HCT VFR BLD AUTO: 33.6 % (ref 40–54)
HGB BLD-MCNC: 10.6 GM/DL (ref 14–18)
IMM GRANULOCYTES # BLD AUTO: 0.04 K/UL (ref 0–0.04)
IMM GRANULOCYTES NFR BLD AUTO: 0.6 % (ref 0–0.5)
LYMPHOCYTES # BLD AUTO: 1.48 K/UL (ref 1–4.8)
MAGNESIUM SERPL-MCNC: 2.3 MG/DL (ref 1.6–2.6)
MCH RBC QN AUTO: 27 PG (ref 27–31)
MCHC RBC AUTO-ENTMCNC: 31.5 G/DL (ref 32–36)
MCV RBC AUTO: 86 FL (ref 82–98)
NUCLEATED RBC (/100WBC) (OHS): 0 /100 WBC
PHOSPHATE SERPL-MCNC: 4.9 MG/DL (ref 2.7–4.5)
PLATELET # BLD AUTO: 203 K/UL (ref 150–450)
PLATELET BLD QL SMEAR: NORMAL
PMV BLD AUTO: 12 FL (ref 9.2–12.9)
POTASSIUM SERPL-SCNC: 4.4 MMOL/L (ref 3.5–5.1)
PROT SERPL-MCNC: 6.7 GM/DL (ref 6–8.4)
RBC # BLD AUTO: 3.93 M/UL (ref 4.6–6.2)
RELATIVE EOSINOPHIL (OHS): 11.9 %
RELATIVE LYMPHOCYTE (OHS): 22.6 % (ref 18–48)
RELATIVE MONOCYTE (OHS): 16.2 % (ref 4–15)
RELATIVE NEUTROPHIL (OHS): 48.4 % (ref 38–73)
SODIUM SERPL-SCNC: 135 MMOL/L (ref 136–145)
WBC # BLD AUTO: 6.55 K/UL (ref 3.9–12.7)

## 2025-08-22 PROCEDURE — 25000003 PHARM REV CODE 250: Mod: HCNC

## 2025-08-22 PROCEDURE — 36415 COLL VENOUS BLD VENIPUNCTURE: CPT | Mod: HCNC | Performed by: STUDENT IN AN ORGANIZED HEALTH CARE EDUCATION/TRAINING PROGRAM

## 2025-08-22 PROCEDURE — 25000003 PHARM REV CODE 250: Mod: HCNC | Performed by: STUDENT IN AN ORGANIZED HEALTH CARE EDUCATION/TRAINING PROGRAM

## 2025-08-22 PROCEDURE — 94640 AIRWAY INHALATION TREATMENT: CPT | Mod: HCNC

## 2025-08-22 PROCEDURE — 63600175 PHARM REV CODE 636 W HCPCS: Mod: HCNC

## 2025-08-22 PROCEDURE — 94761 N-INVAS EAR/PLS OXIMETRY MLT: CPT | Mod: HCNC

## 2025-08-22 PROCEDURE — 84450 TRANSFERASE (AST) (SGOT): CPT | Mod: HCNC | Performed by: STUDENT IN AN ORGANIZED HEALTH CARE EDUCATION/TRAINING PROGRAM

## 2025-08-22 PROCEDURE — 83735 ASSAY OF MAGNESIUM: CPT | Mod: HCNC | Performed by: STUDENT IN AN ORGANIZED HEALTH CARE EDUCATION/TRAINING PROGRAM

## 2025-08-22 PROCEDURE — 25000242 PHARM REV CODE 250 ALT 637 W/ HCPCS: Mod: HCNC | Performed by: STUDENT IN AN ORGANIZED HEALTH CARE EDUCATION/TRAINING PROGRAM

## 2025-08-22 PROCEDURE — 85025 COMPLETE CBC W/AUTO DIFF WBC: CPT | Mod: HCNC | Performed by: STUDENT IN AN ORGANIZED HEALTH CARE EDUCATION/TRAINING PROGRAM

## 2025-08-22 PROCEDURE — 84100 ASSAY OF PHOSPHORUS: CPT | Mod: HCNC | Performed by: STUDENT IN AN ORGANIZED HEALTH CARE EDUCATION/TRAINING PROGRAM

## 2025-08-22 RX ORDER — CEFPODOXIME PROXETIL 200 MG/1
200 TABLET, FILM COATED ORAL DAILY
Qty: 1 TABLET | Refills: 0 | Status: SHIPPED | OUTPATIENT
Start: 2025-08-22

## 2025-08-22 RX ORDER — PANTOPRAZOLE SODIUM 40 MG/1
40 TABLET, DELAYED RELEASE ORAL DAILY
Qty: 30 TABLET | Refills: 2 | Status: SHIPPED | OUTPATIENT
Start: 2025-08-22 | End: 2025-11-20

## 2025-08-22 RX ORDER — SEVELAMER CARBONATE 800 MG/1
800 TABLET, FILM COATED ORAL
Qty: 90 TABLET | Refills: 2 | Status: SHIPPED | OUTPATIENT
Start: 2025-08-22 | End: 2025-11-20

## 2025-08-22 RX ORDER — SODIUM CHLORIDE 9 MG/ML
INJECTION, SOLUTION INTRAVENOUS
OUTPATIENT
Start: 2025-08-22

## 2025-08-22 RX ORDER — TIOTROPIUM BROMIDE AND OLODATEROL 3.124; 2.736 UG/1; UG/1
2 SPRAY, METERED RESPIRATORY (INHALATION) DAILY
Qty: 4 G | Refills: 4 | Status: SHIPPED | OUTPATIENT
Start: 2025-08-22

## 2025-08-22 RX ORDER — SODIUM CHLORIDE 9 MG/ML
INJECTION, SOLUTION INTRAVENOUS ONCE
OUTPATIENT
Start: 2025-08-22 | End: 2025-08-22

## 2025-08-22 RX ORDER — APIXABAN 5 MG/1
5 TABLET, FILM COATED ORAL 2 TIMES DAILY
Qty: 60 TABLET | Refills: 32 | Status: SHIPPED | OUTPATIENT
Start: 2025-08-22

## 2025-08-22 RX ADMIN — SUCRALFATE 1 G: 1 SUSPENSION ORAL at 05:08

## 2025-08-22 RX ADMIN — AMIODARONE HYDROCHLORIDE 200 MG: 200 TABLET ORAL at 08:08

## 2025-08-22 RX ADMIN — CEFTRIAXONE SODIUM 2 G: 2 INJECTION, POWDER, FOR SOLUTION INTRAMUSCULAR; INTRAVENOUS at 09:08

## 2025-08-22 RX ADMIN — MUPIROCIN: 20 OINTMENT TOPICAL at 08:08

## 2025-08-22 RX ADMIN — SEVELAMER CARBONATE 800 MG: 800 TABLET, FILM COATED ORAL at 08:08

## 2025-08-22 RX ADMIN — APIXABAN 5 MG: 5 TABLET, FILM COATED ORAL at 08:08

## 2025-08-22 RX ADMIN — TIOTROPIUM BROMIDE INHALATION SPRAY 2 PUFF: 3.12 SPRAY, METERED RESPIRATORY (INHALATION) at 08:08

## 2025-08-22 RX ADMIN — SUCRALFATE 1 G: 1 SUSPENSION ORAL at 12:08

## 2025-08-22 RX ADMIN — SEVELAMER CARBONATE 800 MG: 800 TABLET, FILM COATED ORAL at 12:08

## 2025-08-22 RX ADMIN — CLOPIDOGREL 75 MG: 75 TABLET ORAL at 08:08

## 2025-08-22 RX ADMIN — IPRATROPIUM BROMIDE AND ALBUTEROL SULFATE 3 ML: 2.5; .5 SOLUTION RESPIRATORY (INHALATION) at 08:08

## 2025-08-22 RX ADMIN — ATORVASTATIN CALCIUM 80 MG: 40 TABLET, FILM COATED ORAL at 08:08

## 2025-08-22 RX ADMIN — FAMOTIDINE 20 MG: 20 TABLET, FILM COATED ORAL at 08:08

## 2025-08-23 LAB
BACTERIA SPT CULT: NORMAL
GRAM STN SPEC: NORMAL
W STREPTOLYSIN O ANTIBODIES: 28 IU/ML

## 2025-08-24 LAB
BACTERIA BLD CULT: NORMAL
BACTERIA BLD CULT: NORMAL

## 2025-08-25 ENCOUNTER — PATIENT OUTREACH (OUTPATIENT)
Dept: ADMINISTRATIVE | Facility: CLINIC | Age: 70
End: 2025-08-25
Payer: MEDICARE

## 2025-08-25 RX ORDER — ATORVASTATIN CALCIUM 80 MG/1
80 TABLET, FILM COATED ORAL DAILY
Qty: 90 TABLET | Refills: 0 | Status: SHIPPED | OUTPATIENT
Start: 2025-08-25

## 2025-08-26 ENCOUNTER — PATIENT MESSAGE (OUTPATIENT)
Dept: ELECTROPHYSIOLOGY | Facility: CLINIC | Age: 70
End: 2025-08-26
Payer: MEDICARE

## 2025-08-26 ENCOUNTER — TELEPHONE (OUTPATIENT)
Dept: ENDOSCOPY | Facility: HOSPITAL | Age: 70
End: 2025-08-26
Payer: MEDICARE

## 2025-08-26 ENCOUNTER — TELEPHONE (OUTPATIENT)
Dept: PAIN MEDICINE | Facility: CLINIC | Age: 70
End: 2025-08-26
Payer: MEDICARE

## 2025-08-26 DIAGNOSIS — M51.372 DEGENERATION OF INTERVERTEBRAL DISC OF LUMBOSACRAL REGION WITH DISCOGENIC BACK PAIN AND LOWER EXTREMITY PAIN: Primary | ICD-10-CM

## 2025-08-26 DIAGNOSIS — M54.16 LUMBAR RADICULOPATHY: ICD-10-CM

## 2025-08-26 LAB
SEROTYPE 1 (1): 1
SEROTYPE 12 (12F): <0.3
SEROTYPE 14 (14): 7.8
SEROTYPE 19 (19F): 2.4
SEROTYPE 23 (23F): 8.1
SEROTYPE 26 (6B): 4.7
SEROTYPE 3 (3): <0.3
SEROTYPE 4 (4): 0.4
SEROTYPE 5 (5): 0.5
SEROTYPE 51 (7F): 5.2
SEROTYPE 56 (18C): 2.2
SEROTYPE 68 (9V): 4.9
SEROTYPE 8 (8): 1.7
SEROTYPE 9 (9N): 0.3

## 2025-08-27 ENCOUNTER — OFFICE VISIT (OUTPATIENT)
Dept: INTERNAL MEDICINE | Facility: CLINIC | Age: 70
End: 2025-08-27
Payer: MEDICARE

## 2025-08-27 ENCOUNTER — HOSPITAL ENCOUNTER (OUTPATIENT)
Dept: RADIOLOGY | Facility: HOSPITAL | Age: 70
Discharge: HOME | End: 2025-08-27
Attending: NURSE PRACTITIONER
Payer: MEDICARE

## 2025-08-27 VITALS
OXYGEN SATURATION: 99 % | BODY MASS INDEX: 26.71 KG/M2 | SYSTOLIC BLOOD PRESSURE: 126 MMHG | DIASTOLIC BLOOD PRESSURE: 62 MMHG | HEIGHT: 73 IN | WEIGHT: 201.5 LBS | HEART RATE: 60 BPM

## 2025-08-27 DIAGNOSIS — I48.0 PAROXYSMAL ATRIAL FIBRILLATION: Chronic | ICD-10-CM

## 2025-08-27 DIAGNOSIS — R51.9 ACUTE NONINTRACTABLE HEADACHE, UNSPECIFIED HEADACHE TYPE: ICD-10-CM

## 2025-08-27 DIAGNOSIS — J18.9 PNEUMONIA OF RIGHT LUNG DUE TO INFECTIOUS ORGANISM, UNSPECIFIED PART OF LUNG: ICD-10-CM

## 2025-08-27 DIAGNOSIS — Z09 HOSPITAL DISCHARGE FOLLOW-UP: Primary | ICD-10-CM

## 2025-08-27 DIAGNOSIS — J44.9 CHRONIC OBSTRUCTIVE PULMONARY DISEASE, UNSPECIFIED COPD TYPE: Chronic | ICD-10-CM

## 2025-08-27 DIAGNOSIS — I50.30 DIASTOLIC CONGESTIVE HEART FAILURE, UNSPECIFIED HF CHRONICITY: ICD-10-CM

## 2025-08-27 DIAGNOSIS — M54.2 NECK PAIN: ICD-10-CM

## 2025-08-27 DIAGNOSIS — I10 PRIMARY HYPERTENSION: ICD-10-CM

## 2025-08-27 DIAGNOSIS — N18.6 ESRD (END STAGE RENAL DISEASE): ICD-10-CM

## 2025-08-27 DIAGNOSIS — I25.10 CORONARY ARTERY DISEASE INVOLVING NATIVE CORONARY ARTERY OF NATIVE HEART WITHOUT ANGINA PECTORIS: Chronic | ICD-10-CM

## 2025-08-27 PROCEDURE — 99999 PR PBB SHADOW E&M-EST. PATIENT-LVL IV: CPT | Mod: PBBFAC,HCNC,, | Performed by: NURSE PRACTITIONER

## 2025-08-27 PROCEDURE — 70450 CT HEAD/BRAIN W/O DYE: CPT | Mod: TC,HCNC

## 2025-08-27 PROCEDURE — 70450 CT HEAD/BRAIN W/O DYE: CPT | Mod: 26,HCNC,, | Performed by: RADIOLOGY

## 2025-08-27 RX ORDER — METHYLPREDNISOLONE 4 MG/1
TABLET ORAL
Qty: 21 EACH | Refills: 0 | Status: SHIPPED | OUTPATIENT
Start: 2025-08-27 | End: 2025-09-17

## 2025-09-03 ENCOUNTER — TELEPHONE (OUTPATIENT)
Dept: PAIN MEDICINE | Facility: CLINIC | Age: 70
End: 2025-09-03
Payer: MEDICARE

## 2025-09-03 DIAGNOSIS — M54.16 LUMBAR RADICULOPATHY: ICD-10-CM

## 2025-09-03 DIAGNOSIS — M51.372 DEGENERATION OF INTERVERTEBRAL DISC OF LUMBOSACRAL REGION WITH DISCOGENIC BACK PAIN AND LOWER EXTREMITY PAIN: Primary | ICD-10-CM

## (undated) DEVICE — COVER BAND BAG 40 X 40

## (undated) DEVICE — DRESSING TRANS 4X4 TEGADERM

## (undated) DEVICE — DRAPE THREE-QUARTER 53X77IN

## (undated) DEVICE — KIT COPILOT VALVE HEMO TOOL

## (undated) DEVICE — DRAPE ANGIO BRACH 38X44IN

## (undated) DEVICE — GAUZE WOVEN STRL 12-PLY 4X4IN

## (undated) DEVICE — GUIDEWIRE STD .035X180CM ANG

## (undated) DEVICE — KIT CUSTOM MANIFOLD

## (undated) DEVICE — CATH EMERGE MR 20 X 2.00

## (undated) DEVICE — INFLATOR ENCORE 26 BLLN INFL

## (undated) DEVICE — CATH ULTRAVERSE 035 8X100X75

## (undated) DEVICE — HEMOSTAT VASC BAND REG 24CM

## (undated) DEVICE — CATH INFINITI 4F JL4 .042X100

## (undated) DEVICE — SUT MCRYL PLUS 4-0 PS2 27IN

## (undated) DEVICE — ELECTRODE REM PLYHSV RETURN 9

## (undated) DEVICE — CATH DXTERITY JL40 100CM 6FR

## (undated) DEVICE — BNDG COFLEX FOAM LF2 ST 4X5YD

## (undated) DEVICE — TRAY CATH LAB OMC

## (undated) DEVICE — SOL NS 1000CC

## (undated) DEVICE — Device

## (undated) DEVICE — DECANTER FLUID TRNSF WHITE 9IN

## (undated) DEVICE — GUIDEWIRE STF .035X180CM ANG

## (undated) DEVICE — APPLICATOR CHLORAPREP ORN 26ML

## (undated) DEVICE — COVER PROBE US 5.5X58L NON LTX

## (undated) DEVICE — SHEATH INTRODUCER 6FR 11CM

## (undated) DEVICE — STOPCOCK 3-WAY

## (undated) DEVICE — PAD CAST SPECIALIST STRL 4

## (undated) DEVICE — CATH ULTRAVERSE 035 7X60X75

## (undated) DEVICE — CATH ULTRAVERSE 035 8X80X75

## (undated) DEVICE — DRAPE U SPLIT SHEET 54X76IN

## (undated) DEVICE — SUT SILK 2-0 SH 18IN BLACK

## (undated) DEVICE — DEVICE PERCLOSE SUT CLSR 6FR

## (undated) DEVICE — LOOP VESSEL BLUE MINI 2/CARD

## (undated) DEVICE — GUIDEWIRE ADVNTG 018X180CM ANG

## (undated) DEVICE — SUT PROLENE 6-0 BV-1 30IN

## (undated) DEVICE — SHEATH PINNACLE 6FRX6CM

## (undated) DEVICE — VISE RADIFOCUS MULTI TORQUE

## (undated) DEVICE — PACK AV VASCULAR ACCESS OMC

## (undated) DEVICE — CATH INFINITI JUDKINS JR4

## (undated) DEVICE — INTRODUCER VASC RADPQ 8FRX10CM

## (undated) DEVICE — PAD DEFIB CADENCE ADULT R2

## (undated) DEVICE — SET IRR URLGY 2LINE UNIV SPIKE

## (undated) DEVICE — KIT GLIDESHEATH SLEND 6FR 10CM

## (undated) DEVICE — CATH EMERGE MR 15 X 2.50

## (undated) DEVICE — SUT PROLENE 6-0 24 BV-1

## (undated) DEVICE — SUT VICRYL 3-0 27 SH

## (undated) DEVICE — SUT MONOCRYL 3-0 SH U/D

## (undated) DEVICE — TOWEL OR DISP STRL BLUE 4/PK

## (undated) DEVICE — TRAY MINOR ORTHO OMC

## (undated) DEVICE — KIT INTRO MICRO NIT VSI 4FR

## (undated) DEVICE — CONTRAST VISIPAQUE 150ML

## (undated) DEVICE — CATH JACKY RADIAL 3.5 110CM

## (undated) DEVICE — STOPCOCK MED PRSS 3-WAY

## (undated) DEVICE — SET MICROPUNCT 5FRMPIS-501

## (undated) DEVICE — OMNIPAQUE CONTRAST 350MG/100ML

## (undated) DEVICE — DRAPE PED LAP SURG 108X77IN

## (undated) DEVICE — SUT 3-0 12-18IN SILK

## (undated) DEVICE — KIT MICROINTRO 4F .018X40X7CM

## (undated) DEVICE — DRAPE HAND STERILE

## (undated) DEVICE — LOOP VESSEL BLUE MAXI

## (undated) DEVICE — SUT ETHILON 3/0 18IN PS-1

## (undated) DEVICE — SPIKE SHORT LG BORE 1-WAY 2IN

## (undated) DEVICE — INFLATOR ENCORE

## (undated) DEVICE — GUIDEWIRE RUNTHROUGH EF 180CM

## (undated) DEVICE — SHEATH PINNACLE 6FR HIFLO

## (undated) DEVICE — SUT 2-0 VICRYL / CT-1

## (undated) DEVICE — CATH DORADO BLLN DIL 6F 6X4X80

## (undated) DEVICE — SOL 9P NACL IRR PIC IL

## (undated) DEVICE — TRANSDUCER ADULT DISP

## (undated) DEVICE — LUBRICANT VIPERSLIDE 100ML BAG

## (undated) DEVICE — DRAPE FEMORAL 82 X 124 IN

## (undated) DEVICE — SUT LIGACLIP SMALL XTRA

## (undated) DEVICE — CATH ARI 4FR

## (undated) DEVICE — SPONGE COTTON TRAY 4X4IN

## (undated) DEVICE — SOL NACL 0.9% IV INJ 1000ML

## (undated) DEVICE — DRAPE STERI U-SHAPED 47X51IN

## (undated) DEVICE — OMNIPAQUE 350MG 150ML VIAL

## (undated) DEVICE — GUIDEWIRE STF .035X260CM ANG

## (undated) DEVICE — DISCONTINUED HS CLEANUP

## (undated) DEVICE — GOWN SMART IMP BREATHABLE XXLG

## (undated) DEVICE — COVERS PROBE NR-48 STERILE

## (undated) DEVICE — CATH ULTRAVERSE 035 10X40X75

## (undated) DEVICE — DRESSING XEROFORM 1X8IN

## (undated) DEVICE — CLIP MED TICALL

## (undated) DEVICE — COVER INSTR ELASTIC BAND 40X20

## (undated) DEVICE — CATH GLIDE ANGLED 5FR 65CM

## (undated) DEVICE — GUIDE LAUNCHER 6FR JR 4.0 SH

## (undated) DEVICE — CATH DORADO 6X2X80

## (undated) DEVICE — CATH GUIDE LINER  V3 6F

## (undated) DEVICE — GUIDEWIRE EMERALD .035IN 260CM

## (undated) DEVICE — SUT PDS II 2-0 CT1

## (undated) DEVICE — TOURNIQUET SB QC DP 18X4IN

## (undated) DEVICE — SHEATH PINNACLE 7FR HIFLO

## (undated) DEVICE — BLLN ARMADA 35 8.0 X 40

## (undated) DEVICE — SUT MONOCRYL 4-0 PS-2

## (undated) DEVICE — GUIDEWIRE EMERALD 150CM PTFE

## (undated) DEVICE — STOPCOCK 3 WAY MED PRESSURE

## (undated) DEVICE — CATH DORADO BLLN DIL 6F 8X4X80

## (undated) DEVICE — PACK UNIVERSAL SPLIT II

## (undated) DEVICE — DRAPE TOP 53X102IN

## (undated) DEVICE — SPONGE GAUZE 16PLY 4X4

## (undated) DEVICE — SUT 2-0 VICRYL / SH (J417)